# Patient Record
Sex: FEMALE | Race: WHITE
[De-identification: names, ages, dates, MRNs, and addresses within clinical notes are randomized per-mention and may not be internally consistent; named-entity substitution may affect disease eponyms.]

---

## 2020-01-06 ENCOUNTER — HOSPITAL ENCOUNTER (EMERGENCY)
Dept: HOSPITAL 46 - ED | Age: 75
Discharge: HOME | End: 2020-01-06
Payer: MEDICARE

## 2020-01-06 VITALS — HEIGHT: 66 IN | WEIGHT: 273.99 LBS | BODY MASS INDEX: 44.03 KG/M2

## 2020-01-06 DIAGNOSIS — E11.9: ICD-10-CM

## 2020-01-06 DIAGNOSIS — I10: ICD-10-CM

## 2020-01-06 DIAGNOSIS — Z79.4: ICD-10-CM

## 2020-01-06 DIAGNOSIS — Z79.899: ICD-10-CM

## 2020-01-06 DIAGNOSIS — M54.41: Primary | ICD-10-CM

## 2020-01-06 DIAGNOSIS — M62.838: ICD-10-CM

## 2020-01-08 ENCOUNTER — HOSPITAL ENCOUNTER (EMERGENCY)
Dept: HOSPITAL 46 - ED | Age: 75
Discharge: HOME | End: 2020-01-08
Payer: MEDICARE

## 2020-01-08 VITALS — BODY MASS INDEX: 40.34 KG/M2 | WEIGHT: 251 LBS | HEIGHT: 66 IN

## 2020-01-08 DIAGNOSIS — Z79.899: ICD-10-CM

## 2020-01-08 DIAGNOSIS — E11.9: ICD-10-CM

## 2020-01-08 DIAGNOSIS — Z79.52: ICD-10-CM

## 2020-01-08 DIAGNOSIS — I27.20: ICD-10-CM

## 2020-01-08 DIAGNOSIS — M54.31: Primary | ICD-10-CM

## 2020-01-08 DIAGNOSIS — Z79.4: ICD-10-CM

## 2020-01-08 NOTE — XMS
Encounter Summary
  Created on: 2020
 
 Viviana Ricci
 External Reference #: [id number]
 : 47
 Sex: Female
 
 Demographics
 
 
+-----------------------+----------------------+
| Address               | [address]      |
|                       | [place]  [address] |
+-----------------------+----------------------+
| Home Phone            | [phone]      |
+-----------------------+----------------------+
| Preferred Language    | Unknown              |
+-----------------------+----------------------+
| Marital Status        | Single               |
+-----------------------+----------------------+
| Latter day Affiliation | [id number]                 |
+-----------------------+----------------------+
| Race                  | Unknown              |
+-----------------------+----------------------+
| Ethnic Group          | Unknown              |
+-----------------------+----------------------+
 
 
 Author
 
 
+--------------+--------------------------------------------+
| Author       | [facility]  |
|              | and [facility]                                |
+--------------+--------------------------------------------+
| Organization | [facility]  |
|              | and [facility]                                |
+--------------+--------------------------------------------+
| Address      | Unknown                                    |
+--------------+--------------------------------------------+
| Phone        | Unavailable                                |
+--------------+--------------------------------------------+
 
 
 
 Support
 
 
+----------------+--------------+---------------------+-----------------+
| Name           | Relationship | Address             | Phone           |
+----------------+--------------+---------------------+-----------------+
| Ada/Ed Radhames | ECON         | [place]              | [phone] |
|                |              | [place]  |                 |
|                |              |  [address]              |                 |
+----------------+--------------+---------------------+-----------------+
 
 
 
 
 Care Team Providers
 
 
+-----------------------+------+-------------+
| Care Team Member Name | Role | Phone       |
+-----------------------+------+-------------+
 PCP  | Unavailable |
+-----------------------+------+-------------+
 
 
 
 Reason for Visit
 
 
+-------------------+----------+
| Reason            | Comments |
+-------------------+----------+
| Medication Refill |          |
+-------------------+----------+
 
 
 
 Encounter Details
 
 
+--------+--------+---------------------+----------------------+-------------------+
| Date   | Type   | Department          | Care Team            | Description       |
+--------+--------+---------------------+----------------------+-------------------+
| / | Refill |   [facility]         |   Marzena Moser  | Medication Refill |
|    |        | NEPHROLOGY  [address]   | M, DO  [address]      |                   |
|        |        | [address]   | [address]      |                   |
|        |        | [place], [place]     | [place], [place]      |                   |
|        |        | [address]          | [address]  [phone]  |                   |
|        |        | [phone]        |  [phone] (Fax)  |                   |
+--------+--------+---------------------+----------------------+-------------------+
 
 
 
 Social History
 
 
+--------------+-------+-----------+--------+------+
| Tobacco Use  | Types | Packs/Day | Years  | Date |
|              |       |           | Used   |      |
+--------------+-------+-----------+--------+------+
| Never Smoker |       |           |        |      |
+--------------+-------+-----------+--------+------+
 
 
 
+---------------------+---+---+---+
| Smokeless Tobacco:  |   |   |   |
| Never Used          |   |   |   |
+---------------------+---+---+---+
 
 
 
+---------------------------------------------------------------+
| Comments: some second hand smoke exposure, but fairly minimal |
 
+---------------------------------------------------------------+
 
 
 
+-------------+-------------+---------+----------+
| Alcohol Use | Drinks/Week | oz/Week | Comments |
+-------------+-------------+---------+----------+
| No          |             |         |          |
+-------------+-------------+---------+----------+
 
 
 
+------------------+---------------+
| Sex Assigned at  | Date Recorded |
| Birth            |               |
+------------------+---------------+
| Not on file      |               |
+------------------+---------------+
 
 
 
+----------------+-------------+-------------+
| Job Start Date | Occupation  | Industry    |
+----------------+-------------+-------------+
| Not on file    | Not on file | Not on file |
+----------------+-------------+-------------+
 
 
 
+----------------+--------------+------------+
| Travel History | Travel Start | Travel End |
+----------------+--------------+------------+
 
 
 
+-------------------------------------+
| No recent travel history available. |
+-------------------------------------+
 documented as of this encounter
 
 Plan of Treatment
 
 
+--------+-----------+------------+----------------------+-------------------+
| Date   | Type      | Specialty  | Care Team            | Description       |
+--------+-----------+------------+----------------------+-------------------+
| / | Office    | Cardiology |   HellalfredoTonia,    |                   |
|    | Visit     |            | ARNP  [address]   |                   |
|        |           |            | St  [place], [place]  |                   |
|        |           |            | [address]  [phone]  |                   |
|        |           |            |  [phone] (Fax)  |                   |
+--------+-----------+------------+----------------------+-------------------+
| / | Hospital  | Radiology  |   Popeye Townsend, |                   |
|    | Encounter |            |  MD  [address] |                   |
|        |           |            |  St.  [place],   |                   |
|        |           |            | [place] [address]             |                   |
|        |           |            | [phone]         |                   |
|        |           |            | [phone] (Fax)   |                   |
+--------+-----------+------------+----------------------+-------------------+
| / | Surgery   | Radiology  |   Popeye Townsend, | CV EP PPM SYSTEM  |
 
|    |           |            |  MD  [address] | IMPLANT           |
|        |           |            |  St.  [place],   |                   |
|        |           |            | [place] [address]             |                   |
|        |           |            | [phone]         |                   |
|        |           |            | [phone] (Fax)   |                   |
+--------+-----------+------------+----------------------+-------------------+
| 02/10/ | Clinical  | Cardiology |                      |                   |
|    | Support   |            |                      |                   |
+--------+-----------+------------+----------------------+-------------------+
| / | Office    | Cardiology |   Tonia Wilson,    |                   |
|    | Visit     |            | ARNP  [address]   |                   |
|        |           |            | [place]  |                   |
|        |           |            | [address]  [phone]  |                   |
|        |           |            |  [phone] (Fax)  |                   |
+--------+-----------+------------+----------------------+-------------------+
| / | Off-Site  | Nephrology |   Marzena Moser  |                   |
|    | Visit     |            | DO ETIENNE  [address] [place] [address]      |                   |
|        |           |            | [address]      |                   |
|        |           |            | [place]      |                   |
|        |           |            | [address]  [phone]  |                   |
|        |           |            |  [phone] (Fax)  |                   |
+--------+-----------+------------+----------------------+-------------------+
 documented as of this encounter
 
 Visit Diagnoses
 
 
+------------------------------------------------------------------------------------------+
| Diagnosis                                                                                |
+------------------------------------------------------------------------------------------+
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
| stage IV, or unspecified(403.90) - Primary  Unspecified hypertensive kidney disease with |
|  chronic kidney disease stage I through stage IV, or unspecified                         |
+------------------------------------------------------------------------------------------+
|   Complications of transplanted kidney                                                   |
+------------------------------------------------------------------------------------------+
|   DIABETES MELLITUS, TYPE II, UNCONTROLLED  Type II or unspecified type diabetes         |
| mellitus without mention of complication, uncontrolled                                   |
+------------------------------------------------------------------------------------------+
|   Hyperlipidemia  Other and unspecified hyperlipidemia                                   |
+------------------------------------------------------------------------------------------+
 documented in this encounter

## 2020-01-08 NOTE — XMS
Encounter Summary
  Created on: 2020
 
 Viviana Ricci
 External Reference #: [id number]
 : 47
 Sex: Female
 
 Demographics
 
 
+-----------------------+----------------------+
| Address               | [address]      |
|                       | [place]  [address] |
+-----------------------+----------------------+
| Home Phone            | [phone]      |
+-----------------------+----------------------+
| Preferred Language    | Unknown              |
+-----------------------+----------------------+
| Marital Status        | Single               |
+-----------------------+----------------------+
| Muslim Affiliation | 1009                 |
+-----------------------+----------------------+
| Race                  | Unknown              |
+-----------------------+----------------------+
| Ethnic Group          | Unknown              |
+-----------------------+----------------------+
 
 
 Author
 
 
+--------------+--------------------------------------------+
| Author       | [facility]  |
|              | and [facility]                                |
+--------------+--------------------------------------------+
| Organization | [facility]  |
|              | and [facility]                                |
+--------------+--------------------------------------------+
| Address      | Unknown                                    |
+--------------+--------------------------------------------+
| Phone        | Unavailable                                |
+--------------+--------------------------------------------+
 
 
 
 Support
 
 
+----------------+--------------+---------------------+-----------------+
| Name           | Relationship | Address             | Phone           |
+----------------+--------------+---------------------+-----------------+
| Ada/Ed Radhames | ECON         | [place]              | [phone] |
|                |              | [place]  |                 |
|                |              |  [address]              |                 |
+----------------+--------------+---------------------+-----------------+
 
 
 
 
 Care Team Providers
 
 
+-----------------------+------+-------------+
| Care Team Member Name | Role | Phone       |
+-----------------------+------+-------------+
 PCP  | Unavailable |
+-----------------------+------+-------------+
 
 
 
 Reason for Visit
 
 
+-------------------+----------+
| Reason            | Comments |
+-------------------+----------+
| Medication Refill |          |
+-------------------+----------+
 
 
 
 Encounter Details
 
 
+--------+--------+---------------------+----------------------+-------------------+
| Date   | Type   | Department          | Care Team            | Description       |
+--------+--------+---------------------+----------------------+-------------------+
| / | Refill |   [facility]         |   Marzena Moser  | Medication Refill |
|    |        | NEPHROLOGY  [address]   | M, DO  [address]      |                   |
|        |        | [address]   | [address]      |                   |
|        |        | [place], [place]     | [place], [place]      |                   |
|        |        | [address]          | [address]  [phone]  |                   |
|        |        | [phone]        |  [phone] (Fax)  |                   |
+--------+--------+---------------------+----------------------+-------------------+
 
 
 
 Social History
 
 
+--------------+-------+-----------+--------+------+
| Tobacco Use  | Types | Packs/Day | Years  | Date |
|              |       |           | Used   |      |
+--------------+-------+-----------+--------+------+
| Never Smoker |       |           |        |      |
+--------------+-------+-----------+--------+------+
 
 
 
+---------------------+---+---+---+
| Smokeless Tobacco:  |   |   |   |
| Never Used          |   |   |   |
+---------------------+---+---+---+
 
 
 
+---------------------------------------------------------------+
| Comments: some second hand smoke exposure, but fairly minimal |
 
+---------------------------------------------------------------+
 
 
 
+-------------+-------------+---------+----------+
| Alcohol Use | Drinks/Week | oz/Week | Comments |
+-------------+-------------+---------+----------+
| No          |             |         |          |
+-------------+-------------+---------+----------+
 
 
 
+------------------+---------------+
| Sex Assigned at  | Date Recorded |
| Birth            |               |
+------------------+---------------+
| Not on file      |               |
+------------------+---------------+
 
 
 
+----------------+-------------+-------------+
| Job Start Date | Occupation  | Industry    |
+----------------+-------------+-------------+
| Not on file    | Not on file | Not on file |
+----------------+-------------+-------------+
 
 
 
+----------------+--------------+------------+
| Travel History | Travel Start | Travel End |
+----------------+--------------+------------+
 
 
 
+-------------------------------------+
| No recent travel history available. |
+-------------------------------------+
 documented as of this encounter
 
 Plan of Treatment
 
 
+--------+-----------+------------+----------------------+-------------------+
| Date   | Type      | Specialty  | Care Team            | Description       |
+--------+-----------+------------+----------------------+-------------------+
| / | Office    | Cardiology |   HellalfredoTonia,    |                   |
|    | Visit     |            | ARNP  [address]   |                   |
|        |           |            | St  [place], [place]  |                   |
|        |           |            | [address]  [phone]  |                   |
|        |           |            |  [phone] (Fax)  |                   |
+--------+-----------+------------+----------------------+-------------------+
| / | Hospital  | Radiology  |   Popeye Townsend, |                   |
|    | Encounter |            |  MD  [address] |                   |
|        |           |            |  St.  [place],   |                   |
|        |           |            | [place] [address]             |                   |
|        |           |            | [phone]         |                   |
|        |           |            | [phone] (Fax)   |                   |
+--------+-----------+------------+----------------------+-------------------+
| / | Surgery   | Radiology  |   Popeye Townsend, | CV EP PPM SYSTEM  |
 
|    |           |            |  MD  [address] | IMPLANT           |
|        |           |            |  St.  [place],   |                   |
|        |           |            | [place] [address]             |                   |
|        |           |            | [phone]         |                   |
|        |           |            | [phone] (Fax)   |                   |
+--------+-----------+------------+----------------------+-------------------+
| 02/10/ | Clinical  | Cardiology |                      |                   |
|    | Support   |            |                      |                   |
+--------+-----------+------------+----------------------+-------------------+
| / | Office    | Cardiology |   Tonia Wilson,    |                   |
|    | Visit     |            | ARNP  [address]   |                   |
|        |           |            | [place]  |                   |
|        |           |            | [address]  [phone]  |                   |
|        |           |            |  [phone] (Fax)  |                   |
+--------+-----------+------------+----------------------+-------------------+
| / | Off-Site  | Nephrology |   Marzena Moser  |                   |
|    | Visit     |            | DO ETIENNE  [address] [place] [address]      |                   |
|        |           |            | [address]      |                   |
|        |           |            | [place]      |                   |
|        |           |            | [address]  [phone]  |                   |
|        |           |            |  [phone] (Fax)  |                   |
+--------+-----------+------------+----------------------+-------------------+
 documented as of this encounter
 
 Visit Diagnoses
 
 
+------------------------------------------------------------------------------------------+
| Diagnosis                                                                                |
+------------------------------------------------------------------------------------------+
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
| stage IV, or unspecified(403.90) - Primary  Unspecified hypertensive kidney disease with |
|  chronic kidney disease stage I through stage IV, or unspecified                         |
+------------------------------------------------------------------------------------------+
|   Complications of transplanted kidney                                                   |
+------------------------------------------------------------------------------------------+
|   DIABETES MELLITUS, TYPE II, UNCONTROLLED  Type II or unspecified type diabetes         |
| mellitus without mention of complication, uncontrolled                                   |
+------------------------------------------------------------------------------------------+
|   Hyperlipidemia  Other and unspecified hyperlipidemia                                   |
+------------------------------------------------------------------------------------------+
 documented in this encounter

## 2020-01-08 NOTE — XMS
Encounter Summary
  Created on: 2020
 
 Viviana Ricci
 External Reference #: [id number]
 : 47
 Sex: Female
 
 Demographics
 
 
+-----------------------+----------------------+
| Address               | [address]      |
|                       | [place]  [address] |
+-----------------------+----------------------+
| Home Phone            | [phone]      |
+-----------------------+----------------------+
| Preferred Language    | Unknown              |
+-----------------------+----------------------+
| Marital Status        | Single               |
+-----------------------+----------------------+
| [employer] Affiliation | [id number]                 |
+-----------------------+----------------------+
| Race                  | Unknown              |
+-----------------------+----------------------+
| Ethnic Group          | Unknown              |
+-----------------------+----------------------+
 
 
 Author
 
 
+--------------+--------------------------------------------+
| Author       | [facility]  |
|              | and [facility]                                |
+--------------+--------------------------------------------+
| Organization | [facility]  |
|              | and [facility]                                |
+--------------+--------------------------------------------+
| Address      | Unknown                                    |
+--------------+--------------------------------------------+
| Phone        | Unavailable                                |
+--------------+--------------------------------------------+
 
 
 
 Support
 
 
+----------------+--------------+---------------------+-----------------+
| Name           | Relationship | Address             | Phone           |
+----------------+--------------+---------------------+-----------------+
| Ada/Ed Radhames | ECON         | [place]              | [phone] |
|                |              | [place]  |                 |
|                |              |  [id number]              |                 |
+----------------+--------------+---------------------+-----------------+
 
 
 
 
 Care Team Providers
 
 
+-----------------------+------+-------------+
| Care Team Member Name | Role | Phone       |
+-----------------------+------+-------------+
 PCP  | Unavailable |
+-----------------------+------+-------------+
 
 
 
 Reason for Visit
 Evaluate & Treat (Routine)
 
+--------+--------+------------+--------------+--------------+---------------+
| Status | Reason | Specialty  | Diagnoses /  | Referred By  | Referred To   |
|        |        |            | Procedures   | Contact      | Contact       |
+--------+--------+------------+--------------+--------------+---------------+
| Closed |        | Nephrology |   Diagnoses  |   Referral,  |   Stroemel,   |
|        |        |            |              | Self         | Marzena CLIFTON DO |
|        |        |            | Complication |              |   [address]    |
|        |        |            | s of         |              | [address]   |
|        |        |            | transplanted |              | [address]  [place]    |
|        |        |            |  kidney      |              | [place]     |
|        |        |            | Chronic      |              | [address]  Phone: |
|        |        |            | kidney       |              |  [phone] |
|        |        |            | disease,     |              |   Fax:        |
|        |        |            | stage I      |              | [phone]  |
|        |        |            | Unspecified  |              |               |
|        |        |            | essential    |              |               |
|        |        |            | hypertension |              |               |
|        |        |            |              |              |               |
|        |        |            | Unspecified  |              |               |
|        |        |            | hypertensive |              |               |
|        |        |            |  kidney      |              |               |
|        |        |            | disease with |              |               |
|        |        |            |  chronic     |              |               |
|        |        |            | kidney       |              |               |
|        |        |            | disease      |              |               |
|        |        |            | stage I      |              |               |
|        |        |            | through      |              |               |
|        |        |            | stage IV, or |              |               |
|        |        |            |              |              |               |
|        |        |            | unspecified( |              |               |
|        |        |            | 403.90)      |              |               |
|        |        |            | Procedures   |              |               |
|        |        |            | PA OFFICE    |              |               |
|        |        |            | OUTPATIENT   |              |               |
|        |        |            | VISIT 25     |              |               |
|        |        |            | MINUTES      |              |               |
+--------+--------+------------+--------------+--------------+---------------+
 
 
 
 
 Encounter Details
 
 
+--------+-----------+---------------------+----------------------+----------------------+
 
| Date   | Type      | Department          | Care Team            | Description          |
+--------+-----------+---------------------+----------------------+----------------------+
| / | Off-Site  |   [facility]         |   Marzena Moser  | Unspecified          |
|    | Visit     | NEPHROLOGY  [address]   | M, DO  [address]      | hypertensive kidney  |
|        |           | [address]   | [address]      | disease with chronic |
|        |           | [place], [place]     | [place]      |  kidney disease      |
|        |           | [address]          | [address]  [phone]  | stage I through      |
|        |           | [phone]        |  [phone] (Fax)  | stage IV, or         |
|        |           |                     |                      | unspecified (Primary |
|        |           |                     |                      |  Dx); FSGS (focal    |
|        |           |                     |                      | segmental            |
|        |           |                     |                      | glomerulosclerosis); |
|        |           |                     |                      |  Hypothyroidism;     |
|        |           |                     |                      | Hyperlipidemia       |
+--------+-----------+---------------------+----------------------+----------------------+
 
 
 
 Social History
 
 
+--------------+-------+-----------+--------+------+
| Tobacco Use  | Types | Packs/Day | Years  | Date |
|              |       |           | Used   |      |
+--------------+-------+-----------+--------+------+
| Never Smoker |       |           |        |      |
+--------------+-------+-----------+--------+------+
 
 
 
+---------------------+---+---+---+
| Smokeless Tobacco:  |   |   |   |
| Never Used          |   |   |   |
+---------------------+---+---+---+
 
 
 
+---------------------------------------------------------------+
| Comments: some second hand smoke exposure, but fairly minimal |
+---------------------------------------------------------------+
 
 
 
+-------------+-------------+---------+----------+
| Alcohol Use | Drinks/Week | oz/Week | Comments |
+-------------+-------------+---------+----------+
| No          |             |         |          |
+-------------+-------------+---------+----------+
 
 
 
+------------------+---------------+
| Sex Assigned at  | Date Recorded |
| Birth            |               |
+------------------+---------------+
| Not on file      |               |
+------------------+---------------+
 
 
 
 
+----------------+-------------+-------------+
| Job Start Date | Occupation  | Industry    |
+----------------+-------------+-------------+
| Not on file    | Not on file | Not on file |
+----------------+-------------+-------------+
 
 
 
+----------------+--------------+------------+
| Travel History | Travel Start | Travel End |
+----------------+--------------+------------+
 
 
 
+-------------------------------------+
| No recent travel history available. |
+-------------------------------------+
 documented as of this encounter
 
 Last Filed Vital Signs
 
 
+-------------------+---------------------+----------------------+----------+
| Vital Sign        | Reading             | Time Taken           | Comments |
+-------------------+---------------------+----------------------+----------+
| Blood Pressure    | 130/86              | 2014  1:14 PM  |          |
|                   |                     | PDT                  |          |
+-------------------+---------------------+----------------------+----------+
| Pulse             | -                   | -                    |          |
+-------------------+---------------------+----------------------+----------+
| Temperature       | 36.4   C (97.5   F) | 2014  1:14 PM  |          |
|                   |                     | PDT                  |          |
+-------------------+---------------------+----------------------+----------+
| Respiratory Rate  | -                   | -                    |          |
+-------------------+---------------------+----------------------+----------+
| Oxygen Saturation | -                   | -                    |          |
+-------------------+---------------------+----------------------+----------+
| Inhaled Oxygen    | -                   | -                    |          |
| Concentration     |                     |                      |          |
+-------------------+---------------------+----------------------+----------+
| Weight            | -                   | -                    |          |
+-------------------+---------------------+----------------------+----------+
| Height            | -                   | -                    |          |
+-------------------+---------------------+----------------------+----------+
| Body Mass Index   | -                   | -                    |          |
+-------------------+---------------------+----------------------+----------+
 documented in this encounter
 
 Progress Notes
 Marzena Moser DO - 2014  4:22 PM PDTFormatting of this note might be different
 from the original.
 Subjective:  NEPHROLOGY 
  
 Patient ID: Viviana Ricci is a 68 y.o. female.
 
 HPI Comments: 
 Followup for this 68 year old white female s/p renal allograft, 05, with previous allo
graft dysfunction secondary to FSGS, also with Type II DM, hypertension, hypothyroidism, hyp
erlipidemia, SHPTH,  previous low back pain, obesity/JACK, chronic pulmonary  hypertension, h
istorically related to centripetal obesity, and  CAD, s/p CABG x3 vessels,.  She refuses
 
 to weight today, but is otherwise , very pleasant and an accurate historian.  She denies co
ugh,  fever, chills, or dyspnea.
 
 MEDS:
 
 Prograf 1.5 mg, BID
 Mycophenolate 250 mg, TID.
 Prednisone 5 mg, daily.
 Outpatient Prescriptions Marked as Taking for the 14 encounter (Off-Site Visit) with ETIENNE Moser, DO 
 Medication Sig Dispense Refill 
   allopurinol (ZYLOPRIM) 100 mg tablet Take 1 tablet by mouth Daily.  30 tablet  11 
   aspirin 81 MG EC tablet Take 81 mg by mouth Daily.     
   cholecalciferol (VITAMIN D-3) 2000 UNITS TABS Take 1,000 Units by mouth Three times a w
[place].     
   cinacalcet (SENSIPAR) 30 mg tablet Take 1 tablet by mouth Daily.  30 tablet  12 
   fludrocortisone (FLORINEF) 0.1 mg tablet Take 1 tablet by mouth Every other day.  45 ta
blet  2 
   fluticasone (FLOVENT HFA) 220 mcg/puff inhaler Inhale 1 puff into the lungs 2 times shante
ly. Rinse mouth after use.  1 Inhaler  11 
   furosemide (LASIX) 40 mg tablet Take 40 mg by mouth Daily as needed.     
   [DISCONTINUED] furosemide (LASIX) 40 mg tablet Take 1 tablet by mouth Daily.  30 tablet
  5 
   glucose blood VI test strips (ONE TOUCH ULTRA TEST) strip Check blood sugar before each
 meal and as directed  100 each  12 
   insulin glargine (LANTUS) 100 units/mL injection Inject 20 Units under the skin every m
orning.  10 mL  11 
   insulin lispro (HUMALOG) 100 units/mL injection Inject subcutaneously before meals acco
rding to sliding scale  10 vial  11 
   Insulin Syringe-Needle U-100 (BD INSULIN SYRINGE ULTRAFINE) 31G X 5/16" 0.5 ML MISC Use
 before meals and as directed.  100 each  11 
   levothyroxine (LEVOTHROID) 50 mcg tablet Take 1 tablet by mouth Daily.  30 tablet  11 
   lisinopril (PRINIVIL,ZESTRIL) 30 MG tablet Take 1 tablet by mouth Daily.  90 tablet  3 
   loperamide (ANTI-DIARRHEAL) 2 mg capsule Take 1 capsule by mouth 4 times daily as neede
d.  60 capsule  5 
   [DISCONTINUED] loperamide (ANTI-DIARRHEAL) 2 mg capsule Take 2 mg by mouth Daily as nee
ded.     
   magnesium oxide (MAG-OX) 400 mg tablet Take 1 tablet by mouth 2 times daily.  62 tablet
  12 
   metoprolol tartrate (LOPRESSOR) 25 mg tablet Take 1 tablet by mouth 2 times daily.  60 
tablet  11 
   omeprazole (PRILOSEC) 20 mg capsule Take one capsule by mouth once daily on an empty st
omach  90 capsule  3 
   Prenatal Multivit-Min-Fe-FA (PRENATAL VITAMINS) 0.8 MG TABS Take 0.8 mg by mouth Daily.
  30 each  11 
   [DISCONTINUED] Prenatal Vit-Fe Fumarate-FA (PNV PRENATAL PLUS MULTIVITAMIN) 27-1 MG TAB
S      
   Respiratory Therapy Supplies MISC Decrease CPAP to 12-18 cmH2O Diagnosis Code(s)327.23.
 Please send order to [facility].  1 each  0 
   rosuvastatin (CRESTOR) 20 mg tablet Take 1 tablet by mouth nightly.  30 tablet  11 
   valsartan (DIOVAN) 160 mg tablet Take 1 tablet by mouth Daily.  30 tablet  11 
 
 No Known Allergies
 
 Objective:   Blood pressure 130/86, temperature 36.4 C (97.5 F).
 weight = refused.
  
 Physical Exam
 
 HEENT: No thrush.
 
 Heart:  Regular rate and rhythm with grade  1/6 MAYA at LSB, no S3, or rub.
 Lungs:   CTA bilaterally, no rales or wheezes.
 Abdomen:  Soft, obese, the renal allograft in RLQ is nontender, normoactive bowel sounds.
 Extremities: No clubbing, cyanosis,  edema, or foot ulcers.
 
 LAB:   
 
 Lab Results 
 Component Value Date 
   2014 
  K 5.4 2014 
   2014 
  CO2 22 2014 
  BUN 40 2014 
  CREEX 1.44* 2014 
  EGFREX 36* 2014 
   2014 
  CALCIUM 10.4 2014 
  PHOS 3.0 2014 
  .9 2014 
  BVV8FDH 7.3* 2014 
 
 Lab Results 
 Component Value Date 
  CHOLEX 166 2014 
  HDLEX 52.6 2014 
  LDLEX 76 2014 
  TRIGEX 186* 2014 
 
 Lab Results 
 Component Value Date 
  TACROLIMUS 5.2 2014 
 .
 
 Lab Results 
 Component Value Date 
  WBCEX 6.3 2014 
  HGBEX 13.5 2014 
  HCTEX 40.8 2014 
  PLTEX 180 2014 
 
  
 
 Assessment: 
 
 1.  Renal Allograft--Scr is close to baseline.  
 2.  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
 3.  Type 2 DM--good control  by the most recent Hbaic.  
 4.  Hyperlipidemia--stable on crestor.
 5.  Hypertension--good control.
 6.  SHPTH--stable for the circumstances.
 7.  Obesity/JACK/Pulmonary hypertension-- stable. 
 8.  CAD, s/p CABG, --stable on ASA, metoprolol, atorvastatin.
 9.  Type IV RTA--stable.
 10. Chronic Low Back pain--in remission.
 11.  worsening DJD, left knee--about same.
 
 Plan: 
 
  
 
 1.  I discussed with her that her Scr, and tacrolimus levels appear satisfactory.  She appe
ars very consistent with her medical regimen.
 2.  She is very sensitive about her weight, and feels somewhat fatalistic about ever contro
lling her obesity, or modifying her lifestyle.
 3.  No change in meds.
 4.  Will plan to see her back in 6 mo. at the [facility] , [place], [place]. She will alyx
nue to have her standing order done Q 2 months.
 
 CC:    Jose David Dalal M.D., [facility], [facility]
            Edgar Sanders MD, [facility], Orthopedics
 
 Electronically signed by Marzena Moser DO at 2014  4:34 PM PDTdocumented in thi
s encounter
 
 Plan of Treatment
 
 
+--------+-----------+------------+----------------------+-------------------+
| Date   | Type      | Specialty  | Care Team            | Description       |
+--------+-----------+------------+----------------------+-------------------+
| / | Office    | Cardiology |   Tonia Wilson,    |                   |
|    | Visit     |            | ARNP  [address]   |                   |
|        |           |            | [place], [place]  |                   |
|        |           |            | [address]  [phone]  |                   |
|        |           |            |  [phone] (Fax)  |                   |
+--------+-----------+------------+----------------------+-------------------+
| / | Hospital  | Radiology  |   Popeye Townsend, |                   |
|    | Encounter |            |  MD  [address] |                   |
|        |           |            |  [place],   |                   |
|        |           |            | [place] [address]             |                   |
|        |           |            | [phone]         |                   |
|        |           |            | [phone] (Fax)   |                   |
+--------+-----------+------------+----------------------+-------------------+
| / | Surgery   | Radiology  |   Popeye Townsend, | CV EP PPM SYSTEM  |
|    |           |            |  MD  [address] | IMPLANT           |
|        |           |            |  [place],   |                   |
|        |           |            | [place] [address]             |                   |
|        |           |            | [phone]         |                   |
|        |           |            | [phone] (Fax)   |                   |
+--------+-----------+------------+----------------------+-------------------+
| 02/10/ | Clinical  | Cardiology |                      |                   |
|    | Support   |            |                      |                   |
+--------+-----------+------------+----------------------+-------------------+
| / | Office    | Cardiology |   RakeshmaxxalfredoArlena,    |                   |
|    | Visit     |            | ARNP  [address]   |                   |
|        |           |            | [place]  |                   |
|        |           |            | [address]  [phone]  |                   |
|        |           |            |  [phone] (Fax)  |                   |
+--------+-----------+------------+----------------------+-------------------+
| / | Off-Site  | Nephrology |   Marzena Moser  |                   |
|    | Visit     |            | DO ETIENNE  [address] [place] [address]      |                   |
|        |           |            | [address]      |                   |
|        |           |            | [place]      |                   |
|        |           |            | [address]  [phone]  |                   |
|        |           |            |  [phone] (Fax)  |                   |
+--------+-----------+------------+----------------------+-------------------+
 documented as of this encounter
 
 Visit Diagnoses
 
 
 
+-----------------------------------------------------------------------------------------+
| Diagnosis                                                                               |
+-----------------------------------------------------------------------------------------+
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
| stage IV, or unspecified - Primary                                                      |
+-----------------------------------------------------------------------------------------+
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
| membranous glomerulonephritis                                                           |
+-----------------------------------------------------------------------------------------+
|   Hypothyroidism  Unspecified hypothyroidism                                            |
+-----------------------------------------------------------------------------------------+
|   Hyperlipidemia  Other and unspecified hyperlipidemia                                  |
+-----------------------------------------------------------------------------------------+
 documented in this encounter

## 2020-01-08 NOTE — XMS
Encounter Summary
  Created on: 2020
 
 Viviana Ricci
 External Reference #: [id number]
 : 47
 Sex: Female
 
 Demographics
 
 
+-----------------------+----------------------+
| Address               | [address]      |
|                       | [place]  [address] |
+-----------------------+----------------------+
| Home Phone            | [phone]      |
+-----------------------+----------------------+
| Preferred Language    | Unknown              |
+-----------------------+----------------------+
| Marital Status        | Single               |
+-----------------------+----------------------+
| Spiritism Affiliation | [id number]                 |
+-----------------------+----------------------+
| Race                  | Unknown              |
+-----------------------+----------------------+
| Ethnic Group          | Unknown              |
+-----------------------+----------------------+
 
 
 Author
 
 
+--------------+--------------------------------------------+
| Author       | [facility]  |
|              | and [place]                                |
+--------------+--------------------------------------------+
| Organization | [facility]  |
|              | and [place]                                |
+--------------+--------------------------------------------+
| Address      | Unknown                                    |
+--------------+--------------------------------------------+
| Phone        | Unavailable                                |
+--------------+--------------------------------------------+
 
 
 
 Support
 
 
+----------------+--------------+---------------------+-----------------+
| Name           | Relationship | Address             | Phone           |
+----------------+--------------+---------------------+-----------------+
| Ada/Ed Radhames | ECON         | [place]              | [phone] |
|                |              | [place]  |                 |
|                |              |  [address]              |                 |
+----------------+--------------+---------------------+-----------------+
 
 
 
 
 Care Team Providers
 
 
+-----------------------+------+-------------+
| Care Team Member Name | Role | Phone       |
+-----------------------+------+-------------+
 PCP  | Unavailable |
+-----------------------+------+-------------+
 
 
 
 Reason for Visit
 
 
+-------------------+----------+
| Reason            | Comments |
+-------------------+----------+
| Medication Refill |          |
+-------------------+----------+
 
 
 
 Encounter Details
 
 
+--------+--------+---------------------+----------------------+-------------------+
| Date   | Type   | Department          | Care Team            | Description       |
+--------+--------+---------------------+----------------------+-------------------+
| / | Refill |   [place]         |   Marzena Moser  | Medication Refill |
| 2016   |        | NEPHROLOGY  [address]   | M, DO  [address]      |                   |
|        |        | [address]   | [address]      |                   |
|        |        | [place], [place]     | [place], [place]      |                   |
|        |        | [address]          | [address]  [phone]  |                   |
|        |        | [phone]        |  [phone] (Fax)  |                   |
+--------+--------+---------------------+----------------------+-------------------+
 
 
 
 Social History
 
 
+--------------+-------+-----------+--------+------+
| Tobacco Use  | Types | Packs/Day | Years  | Date |
|              |       |           | Used   |      |
+--------------+-------+-----------+--------+------+
| Never Smoker |       |           |        |      |
+--------------+-------+-----------+--------+------+
 
 
 
+---------------------+---+---+---+
| Smokeless Tobacco:  |   |   |   |
| Never Used          |   |   |   |
+---------------------+---+---+---+
 
 
 
+---------------------------------------------------------------+
| Comments: some second hand smoke exposure, but fairly minimal |
 
+---------------------------------------------------------------+
 
 
 
+-------------+-------------+---------+----------+
| Alcohol Use | Drinks/Week | oz/Week | Comments |
+-------------+-------------+---------+----------+
| No          |             |         |          |
+-------------+-------------+---------+----------+
 
 
 
+------------------+---------------+
| Sex Assigned at  | Date Recorded |
| Birth            |               |
+------------------+---------------+
| Not on file      |               |
+------------------+---------------+
 
 
 
+----------------+-------------+-------------+
| Job Start Date | Occupation  | Industry    |
+----------------+-------------+-------------+
| Not on file    | Not on file | Not on file |
+----------------+-------------+-------------+
 
 
 
+----------------+--------------+------------+
| Travel History | Travel Start | Travel End |
+----------------+--------------+------------+
 
 
 
+-------------------------------------+
| No recent travel history available. |
+-------------------------------------+
 documented as of this encounter
 
 Plan of Treatment
 
 
+--------+-----------+------------+----------------------+-------------------+
| Date   | Type      | Specialty  | Care Team            | Description       |
+--------+-----------+------------+----------------------+-------------------+
| / | Office    | Cardiology |   HellalfredoTonia,    |                   |
|    | Visit     |            | ARNP  [address]   |                   |
|        |           |            | St  [place], [place]  |                   |
|        |           |            | [address]  [phone]  |                   |
|        |           |            |  [phone] (Fax)  |                   |
+--------+-----------+------------+----------------------+-------------------+
| / | Hospital  | Radiology  |   Popeye Townsend, |                   |
|    | Encounter |            |  MD  [address] |                   |
|        |           |            |  St.  [place],   |                   |
|        |           |            | [place] [address]             |                   |
|        |           |            | [phone]         |                   |
|        |           |            | [phone] (Fax)   |                   |
+--------+-----------+------------+----------------------+-------------------+
| / | Surgery   | Radiology  |   Popeye Townsend, | CV EP PPM SYSTEM  |
 
|    |           |            |  MD  [address] | IMPLANT           |
|        |           |            |  St.  [place],   |                   |
|        |           |            | [place] [address]             |                   |
|        |           |            | [phone]         |                   |
|        |           |            | [phone] (Fax)   |                   |
+--------+-----------+------------+----------------------+-------------------+
| 02/10/ | Clinical  | Cardiology |                      |                   |
|    | Support   |            |                      |                   |
+--------+-----------+------------+----------------------+-------------------+
| / | Office    | Cardiology |   Tonia Wilson,    |                   |
|    | Visit     |            | ARNP  [address]   |                   |
|        |           |            | [place]  |                   |
|        |           |            | [address]  [phone]  |                   |
|        |           |            |  [phone] (Fax)  |                   |
+--------+-----------+------------+----------------------+-------------------+
| / | Off-Site  | Nephrology |   Marzena Moser  |                   |
|    | Visit     |            | DO ETIENNE  [address] [place] [address]      |                   |
|        |           |            | [address]      |                   |
|        |           |            | [place]      |                   |
|        |           |            | [address]  [phone]  |                   |
|        |           |            |  [phone] (Fax)  |                   |
+--------+-----------+------------+----------------------+-------------------+
 documented as of this encounter
 
 Visit Diagnoses
 Not on filedocumented in this encounter

## 2020-01-08 NOTE — XMS
Encounter Summary
  Created on: 2020
 
 Viviana Ricci
 External Reference #: [id number]
 : 47
 Sex: Female
 
 Demographics
 
 
+-----------------------+----------------------+
| Address               | [address]      |
|                       | [place]  [address] |
+-----------------------+----------------------+
| Home Phone            | [phone]      |
+-----------------------+----------------------+
| Preferred Language    | Unknown              |
+-----------------------+----------------------+
| Marital Status        | Single               |
+-----------------------+----------------------+
| Adventism Affiliation | [id number]                 |
+-----------------------+----------------------+
| Race                  | Unknown              |
+-----------------------+----------------------+
| Ethnic Group          | Unknown              |
+-----------------------+----------------------+
 
 
 Author
 
 
+--------------+--------------------------------------------+
| Author       | [facility]  |
|              | and [place]                                |
+--------------+--------------------------------------------+
| Organization | [facility]  |
|              | and [place]                                |
+--------------+--------------------------------------------+
| Address      | Unknown                                    |
+--------------+--------------------------------------------+
| Phone        | Unavailable                                |
+--------------+--------------------------------------------+
 
 
 
 Support
 
 
+----------------+--------------+---------------------+-----------------+
| Name           | Relationship | Address             | Phone           |
+----------------+--------------+---------------------+-----------------+
| Ada/Ed Radhames | ECON         | [place]              | [phone] |
|                |              | [place]  |                 |
|                |              |  [address]              |                 |
+----------------+--------------+---------------------+-----------------+
 
 
 
 
 Care Team Providers
 
 
+-----------------------+------+-------------+
| Care Team Member Name | Role | Phone       |
+-----------------------+------+-------------+
 PCP  | Unavailable |
+-----------------------+------+-------------+
 
 
 
 Encounter Details
 
 
+--------+----------+---------------------+----------------------+-------------+
| Date   | Type     | Department          | Care Team            | Description |
+--------+----------+---------------------+----------------------+-------------+
| / | Abstract |   [facility] [place]         |   Marzena Moser  |             |
| 2017   |          | NEPHROLOGY  [address]   | M, DO  [address]      |             |
|        |          | [address]   | [address]      |             |
|        |          | [place], [place]     | [place]      |             |
|        |          | [address]          | [address]  [phone]  |             |
|        |          | [phone]        |  [phone] (Fax)  |             |
+--------+----------+---------------------+----------------------+-------------+
 
 
 
 Social History
 
 
+--------------+-------+-----------+--------+------+
| Tobacco Use  | Types | Packs/Day | Years  | Date |
|              |       |           | Used   |      |
+--------------+-------+-----------+--------+------+
| Never Smoker |       |           |        |      |
+--------------+-------+-----------+--------+------+
 
 
 
+---------------------+---+---+---+
| Smokeless Tobacco:  |   |   |   |
| Never Used          |   |   |   |
+---------------------+---+---+---+
 
 
 
+---------------------------------------------------------------+
| Comments: some second hand smoke exposure, but fairly minimal |
+---------------------------------------------------------------+
 
 
 
+-------------+-------------+---------+----------+
| Alcohol Use | Drinks/Week | oz/Week | Comments |
+-------------+-------------+---------+----------+
| No          |             |         |          |
+-------------+-------------+---------+----------+
 
 
 
 
+------------------+---------------+
| Sex Assigned at  | Date Recorded |
| Birth            |               |
+------------------+---------------+
| Not on file      |               |
+------------------+---------------+
 
 
 
+----------------+-------------+-------------+
| Job Start Date | Occupation  | Industry    |
+----------------+-------------+-------------+
| Not on file    | Not on file | Not on file |
+----------------+-------------+-------------+
 
 
 
+----------------+--------------+------------+
| Travel History | Travel Start | Travel End |
+----------------+--------------+------------+
 
 
 
+-------------------------------------+
| No recent travel history available. |
+-------------------------------------+
 documented as of this encounter
 
 Progress Maite Perez - 2017  8:50 AM PSTOutside record:  Refill authorization request for
[facility]is [facility], dos:  17.  Sent to scan.Electronically signed by Maite Raygoza at 2017  8:50 AM PSTdocumented in this encounter
 
 Plan of Treatment
 
 
+--------+-----------+------------+----------------------+-------------------+
| Date   | Type      | Specialty  | Care Team            | Description       |
+--------+-----------+------------+----------------------+-------------------+
| / | Office    | Cardiology |   Tonia Wilson,    |                   |
| 2020   | Visit     |            | ARNJESSICA  [address]   |                   |
|        |           |            | St  [facility] [place]  |                   |
|        |           |            | [address]  [phone]  |                   |
|        |           |            |  [phone] (Fax)  |                   |
+--------+-----------+------------+----------------------+-------------------+
| / | Hospital  | Radiology  |   Popeye Townsend, |                   |
|    | Encounter |            |  MD  [address] |                   |
|        |           |            |  St.  [place],   |                   |
|        |           |            | [place] [address]             |                   |
|        |           |            | [phone]         |                   |
|        |           |            | [phone] (Fax)   |                   |
+--------+-----------+------------+----------------------+-------------------+
| / | Surgery   | Radiology  |   Popeye Townsend, | CV EP PPM SYSTEM  |
|    |           |            |  MD  [address] | IMPLANT           |
|        |           |            |  St.  [place],   |                   |
|        |           |            | [place] [address]             |                   |
|        |           |            | [phone]         |                   |
|        |           |            | [phone] (Fax)   |                   |
+--------+-----------+------------+----------------------+-------------------+
 
| 02/10/ | Clinical  | Cardiology |                      |                   |
2020   | Support   |            |                      |                   |
+--------+-----------+------------+----------------------+-------------------+
| / | Office    | Cardiology |   Tonia Wilson,    |                   |
|    | Visit     |            | BELKIS  [address]   |                   |
|        |           |            | [place]  |                   |
|        |           |            | [address]  [phone]  |                   |
|        |           |            |  [phone] (Fax)  |                   |
+--------+-----------+------------+----------------------+-------------------+
| / | Off-Site  | Nephrology |   Marzena Moser  |                   |
|    | Visit     |            | DO LUIS M  [address] [place] [address]      |                   |
|        |           |            | [address]      |                   |
|        |           |            | [place]      |                   |
|        |           |            | [address]  [phone]  |                   |
|        |           |            |  [phone] (Fax)  |                   |
+--------+-----------+------------+----------------------+-------------------+
 documented as of this encounter
 
 Visit Diagnoses
 Not on filedocumented in this encounter

## 2020-01-08 NOTE — XMS
Encounter Summary
  Created on: 2020
 
 Viviana Ricci
 External Reference #: [id number]
 : 47
 Sex: Female
 
 Demographics
 
 
+-----------------------+----------------------+
| Address               | [address]      |
|                       | [place]  [address] |
+-----------------------+----------------------+
| Home Phone            | [phone]      |
+-----------------------+----------------------+
| Preferred Language    | Unknown              |
+-----------------------+----------------------+
| Marital Status        | Single               |
+-----------------------+----------------------+
| Mandaen Affiliation | [id number]                 |
+-----------------------+----------------------+
| Race                  | Unknown              |
+-----------------------+----------------------+
| Ethnic Group          | Unknown              |
+-----------------------+----------------------+
 
 
 Author
 
 
+--------------+--------------------------------------------+
| Author       | [facility]  |
|              | and [place]                                |
+--------------+--------------------------------------------+
| Organization | [facility]  |
|              | and [place]                                |
+--------------+--------------------------------------------+
| Address      | Unknown                                    |
+--------------+--------------------------------------------+
| Phone        | Unavailable                                |
+--------------+--------------------------------------------+
 
 
 
 Support
 
 
+----------------+--------------+---------------------+-----------------+
| Name           | Relationship | Address             | Phone           |
+----------------+--------------+---------------------+-----------------+
| Ada/Ed Radhames | ECON         | [address]              | [phone] |
|                |              | [place]  |                 |
|                |              |  [address]              |                 |
+----------------+--------------+---------------------+-----------------+
 
 
 
 
 Care Team Providers
 
 
+-----------------------+------+-------------+
| Care Team Member Name | Role | Phone       |
+-----------------------+------+-------------+
 PCP  | Unavailable |
+-----------------------+------+-------------+
 
 
 
 Reason for Visit
 
 
+---------+----------+
| Reason  | Comments |
+---------+----------+
| Results |          |
+---------+----------+
 
 
 
 Encounter Details
 
 
+--------+-----------+----------------------+----------------+-------------+
| Date   | Type      | Department           | Care Team      | Description |
+--------+-----------+----------------------+----------------+-------------+
| 06/19/ | Telephone |   [facility]          |   [place],  | Results     |
|    |           | PULMONARY  [address]     | Nena GUSMAN MD  |             |
|        |           | [place]  [place], |                |             |
|        |           |  [place] [address]       |                |             |
|        |           | [phone]         |                |             |
+--------+-----------+----------------------+----------------+-------------+
 
 
 
 Social History
 
 
+--------------+-------+-----------+--------+------+
| Tobacco Use  | Types | Packs/Day | Years  | Date |
|              |       |           | Used   |      |
+--------------+-------+-----------+--------+------+
| Never Smoker |       |           |        |      |
+--------------+-------+-----------+--------+------+
 
 
 
+---------------------+---+---+---+
| Smokeless Tobacco:  |   |   |   |
| Never Used          |   |   |   |
+---------------------+---+---+---+
 
 
 
+---------------------------------------------------------------+
| Comments: some second hand smoke exposure, but fairly minimal |
+---------------------------------------------------------------+
 
 
 
 
+-------------+-------------+---------+----------+
| Alcohol Use | Drinks/Week | oz/Week | Comments |
+-------------+-------------+---------+----------+
| No          |             |         |          |
+-------------+-------------+---------+----------+
 
 
 
+------------------+---------------+
| Sex Assigned at  | Date Recorded |
| Birth            |               |
+------------------+---------------+
| Not on file      |               |
+------------------+---------------+
 
 
 
+----------------+-------------+-------------+
| Job Start Date | Occupation  | Industry    |
+----------------+-------------+-------------+
| Not on file    | Not on file | Not on file |
+----------------+-------------+-------------+
 
 
 
+----------------+--------------+------------+
| Travel History | Travel Start | Travel End |
+----------------+--------------+------------+
 
 
 
+-------------------------------------+
| No recent travel history available. |
+-------------------------------------+
 documented as of this encounter
 
 Plan of Treatment
 
 
+--------+-----------+------------+----------------------+-------------------+
| Date   | Type      | Specialty  | Care Team            | Description       |
+--------+-----------+------------+----------------------+-------------------+
| / | Office    | Cardiology |   Tonia Wilson,    |                   |
|    | Visit     |            | ARNP  [address]   |                   |
|        |           |            | [place]  |                   |
|        |           |            | [address]  [phone]  |                   |
|        |           |            |  [phone] (Fax)  |                   |
+--------+-----------+------------+----------------------+-------------------+
| / | Hospital  | Radiology  |   Popeye Townsend, |                   |
2020   | Encounter |            |  MD  [address] [place] |                   |
|        |           |            |  St.  [place],   |                   |
|        |           |            | [place] [address]             |                   |
|        |           |            | [phone]         |                   |
|        |           |            | [phone] (Fax)   |                   |
+--------+-----------+------------+----------------------+-------------------+
| / | Surgery   | Radiology  |   Popeye Townsend, | CV EP PPM SYSTEM  |
|    |           |            |  MD  [address] | IMPLANT           |
 
|        |           |            |  St.  [place],   |                   |
|        |           |            | [place] [address]             |                   |
|        |           |            | [phone]         |                   |
|        |           |            | [phone] (Fax)   |                   |
+--------+-----------+------------+----------------------+-------------------+
| 02/10/ | Clinical  | Cardiology |                      |                   |
2020   | Support   |            |                      |                   |
+--------+-----------+------------+----------------------+-------------------+
| / | Office    | Cardiology |   Tonia Wilson,    |                   |
|    | Visit     |            | ARNP  [address]   |                   |
|        |           |            | St  [place]  |                   |
|        |           |            | [address]  [phone]  |                   |
|        |           |            |  [phone] (Fax)  |                   |
+--------+-----------+------------+----------------------+-------------------+
| / | Off-Site  | Nephrology |   Marzena Moser  |                   |
|    | Visit     |            | DO ETIENNE  [address]      |                   |
|        |           |            | [address]      |                   |
|        |           |            | [place]      |                   |
|        |           |            | [address]  [phone]  |                   |
|        |           |            |  [phone] (Fax)  |                   |
+--------+-----------+------------+----------------------+-------------------+
 
 
 
+----------------+-------------+--------+----------------------+----------------------+
| Name           | Type        | Priori | Associated Diagnoses | Order Schedule       |
|                |             | ty     |                      |                      |
+----------------+-------------+--------+----------------------+----------------------+
| TSH            | Lab         | Routin |   Fatigue            | 1 Occurrences        |
|                |             | e      |                      | starting 2013  |
|                |             |        |                      | until 2014     |
+----------------+-------------+--------+----------------------+----------------------+
| Oxygen Therapy | Respiratory | Routin |   Hypoxemia          | Expected:            |
|                |  Care       | e      |                      | 2013, Expires: |
|                |             |        |                      |  2014          |
+----------------+-------------+--------+----------------------+----------------------+
 documented as of this encounter
 
 Visit Diagnoses
 
 
+------------------------------------------------+
| Diagnosis                                      |
+------------------------------------------------+
|   Fatigue - Primary  Other malaise and fatigue |
+------------------------------------------------+
|   Hypoxemia                                    |
+------------------------------------------------+
 documented in this encounter

## 2020-01-08 NOTE — XMS
Encounter Summary
  Created on: 2020
 
 Viviana Ricci
 External Reference #: [id number]
 : 47
 Sex: Female
 
 Demographics
 
 
+-----------------------+----------------------+
| Address               | [address]      |
|                       | [place]  [address] |
+-----------------------+----------------------+
| Home Phone            | [phone]      |
+-----------------------+----------------------+
| Preferred Language    | Unknown              |
+-----------------------+----------------------+
| Marital Status        | Single               |
+-----------------------+----------------------+
| Mosque Affiliation | 1009                 |
+-----------------------+----------------------+
| Race                  | Unknown              |
+-----------------------+----------------------+
| Ethnic Group          | Unknown              |
+-----------------------+----------------------+
 
 
 Author
 
 
+--------------+--------------------------------------------+
| Author       | [facility]  |
|              | and [place]                                |
+--------------+--------------------------------------------+
| Organization | [facility]  |
|              | and [place]                                |
+--------------+--------------------------------------------+
| Address      | Unknown                                    |
+--------------+--------------------------------------------+
| Phone        | Unavailable                                |
+--------------+--------------------------------------------+
 
 
 
 Support
 
 
+----------------+--------------+---------------------+-----------------+
| Name           | Relationship | Address             | Phone           |
+----------------+--------------+---------------------+-----------------+
| Ada/Ed Radhames | ECON         | [place]              | [phone] |
|                |              | [place]  |                 |
|                |              |  [address]              |                 |
+----------------+--------------+---------------------+-----------------+
 
 
 
 
 Care Team Providers
 
 
+-----------------------+------+-------------+
| Care Team Member Name | Role | Phone       |
+-----------------------+------+-------------+
 PCP  | Unavailable |
+-----------------------+------+-------------+
 
 
 
 Reason for Visit
 
 
+-------------------+----------+
| Reason            | Comments |
+-------------------+----------+
| Medication Refill |          |
+-------------------+----------+
 
 
 
 Encounter Details
 
 
+--------+--------+---------------------+----------------------+-------------------+
| Date   | Type   | Department          | Care Team            | Description       |
+--------+--------+---------------------+----------------------+-------------------+
| 10/27/ | Refill |   [place]         |   Marzena Moser  | Medication Refill |
| 2017   |        | NEPHROLOGY  [address]   | M, DO  [address]      |                   |
|        |        | [address]   | [address]      |                   |
|        |        | [place], [place]     | [place], [place]      |                   |
|        |        | [address]          | [address]  [phone]  |                   |
|        |        | [phone]        |  [phone] (Fax)  |                   |
+--------+--------+---------------------+----------------------+-------------------+
 
 
 
 Social History
 
 
+--------------+-------+-----------+--------+------+
| Tobacco Use  | Types | Packs/Day | Years  | Date |
|              |       |           | Used   |      |
+--------------+-------+-----------+--------+------+
| Never Smoker |       |           |        |      |
+--------------+-------+-----------+--------+------+
 
 
 
+---------------------+---+---+---+
| Smokeless Tobacco:  |   |   |   |
| Never Used          |   |   |   |
+---------------------+---+---+---+
 
 
 
+---------------------------------------------------------------+
| Comments: some second hand smoke exposure, but fairly minimal |
 
+---------------------------------------------------------------+
 
 
 
+-------------+-------------+---------+----------+
| Alcohol Use | Drinks/Week | oz/Week | Comments |
+-------------+-------------+---------+----------+
| No          |             |         |          |
+-------------+-------------+---------+----------+
 
 
 
+------------------+---------------+
| Sex Assigned at  | Date Recorded |
| Birth            |               |
+------------------+---------------+
| Not on file      |               |
+------------------+---------------+
 
 
 
+----------------+-------------+-------------+
| Job Start Date | Occupation  | Industry    |
+----------------+-------------+-------------+
| Not on file    | Not on file | Not on file |
+----------------+-------------+-------------+
 
 
 
+----------------+--------------+------------+
| Travel History | Travel Start | Travel End |
+----------------+--------------+------------+
 
 
 
+-------------------------------------+
| No recent travel history available. |
+-------------------------------------+
 documented as of this encounter
 
 Plan of Treatment
 
 
+--------+-----------+------------+----------------------+-------------------+
| Date   | Type      | Specialty  | Care Team            | Description       |
+--------+-----------+------------+----------------------+-------------------+
| / | Office    | Cardiology |   HellalfredoTonia,    |                   |
|    | Visit     |            | ARNP  [address]   |                   |
|        |           |            | St  [place], [place]  |                   |
|        |           |            | [address]  [phone]  |                   |
|        |           |            |  [phone] (Fax)  |                   |
+--------+-----------+------------+----------------------+-------------------+
| / | Hospital  | Radiology  |   Popeye Townsend, |                   |
|    | Encounter |            |  MD  [address] |                   |
|        |           |            |  St.  [place],   |                   |
|        |           |            | [place] [address]             |                   |
|        |           |            | [phone]         |                   |
|        |           |            | [phone] (Fax)   |                   |
+--------+-----------+------------+----------------------+-------------------+
| / | Surgery   | Radiology  |   Popeye Townsend, | CV EP PPM SYSTEM  |
 
|    |           |            |  MD  [address] | IMPLANT           |
|        |           |            |  St.  [place],   |                   |
|        |           |            | [place] [address]             |                   |
|        |           |            | [phone]         |                   |
|        |           |            | [phone] (Fax)   |                   |
+--------+-----------+------------+----------------------+-------------------+
| 02/10/ | Clinical  | Cardiology |                      |                   |
|    | Support   |            |                      |                   |
+--------+-----------+------------+----------------------+-------------------+
| / | Office    | Cardiology |   Tonia Wilson,    |                   |
|    | Visit     |            | ARNP  [address]   |                   |
|        |           |            | [place]  |                   |
|        |           |            | [address]  [phone]  |                   |
|        |           |            |  [phone] (Fax)  |                   |
+--------+-----------+------------+----------------------+-------------------+
| / | Off-Site  | Nephrology |   Marzena Moser  |                   |
|    | Visit     |            | DO ETIENNE  [address] [place] [address]      |                   |
|        |           |            | [address]      |                   |
|        |           |            | [place]      |                   |
|        |           |            | [address]  [phone]  |                   |
|        |           |            |  [phone] (Fax)  |                   |
+--------+-----------+------------+----------------------+-------------------+
 documented as of this encounter
 
 Visit Diagnoses
 Not on filedocumented in this encounter

## 2020-01-08 NOTE — XMS
Encounter Summary
  Created on: 2020
 
 Viviana Ricci
 External Reference #: [id number]
 : 47
 Sex: Female
 
 Demographics
 
 
+-----------------------+----------------------+
| Address               | [address]      |
|                       | [place]  [address] |
+-----------------------+----------------------+
| Home Phone            | [phone]      |
+-----------------------+----------------------+
| Preferred Language    | Unknown              |
+-----------------------+----------------------+
| Marital Status        | Single               |
+-----------------------+----------------------+
| Buddhist Affiliation | 1009                 |
+-----------------------+----------------------+
| Race                  | Unknown              |
+-----------------------+----------------------+
| Ethnic Group          | Unknown              |
+-----------------------+----------------------+
 
 
 Author
 
 
+--------------+--------------------------------------------+
| Author       | [facility]  |
|              | and [facility]                                |
+--------------+--------------------------------------------+
| Organization | [facility]  |
|              | and [facility]                                |
+--------------+--------------------------------------------+
| Address      | Unknown                                    |
+--------------+--------------------------------------------+
| Phone        | Unavailable                                |
+--------------+--------------------------------------------+
 
 
 
 Support
 
 
+----------------+--------------+---------------------+-----------------+
| Name           | Relationship | Address             | Phone           |
+----------------+--------------+---------------------+-----------------+
| Ada/Ed Radhames | ECON         | [place]              | [phone] |
|                |              | [place]  |                 |
|                |              |  [address]              |                 |
+----------------+--------------+---------------------+-----------------+
 
 
 
 
 Care Team Providers
 
 
+-----------------------+------+-------------+
| Care Team Member Name | Role | Phone       |
+-----------------------+------+-------------+
 PCP  | Unavailable |
+-----------------------+------+-------------+
 
 
 
 Reason for Visit
 
 
+-------------------+----------+
| Reason            | Comments |
+-------------------+----------+
| Medication Refill |          |
+-------------------+----------+
 
 
 
 Encounter Details
 
 
+--------+--------+---------------------+----------------------+-------------------+
| Date   | Type   | Department          | Care Team            | Description       |
+--------+--------+---------------------+----------------------+-------------------+
| / | Refill |   [facility]         |   Marzena Moser  | Medication Refill |
|    |        | NEPHROLOGY  [address]   | M, DO  [address]      |                   |
|        |        | [address]   | [address]      |                   |
|        |        | [place], [place]     | [place], [place]      |                   |
|        |        | [address]          | [address]  [phone]  |                   |
|        |        | [phone]        |  [phone] (Fax)  |                   |
+--------+--------+---------------------+----------------------+-------------------+
 
 
 
 Social History
 
 
+--------------+-------+-----------+--------+------+
| Tobacco Use  | Types | Packs/Day | Years  | Date |
|              |       |           | Used   |      |
+--------------+-------+-----------+--------+------+
| Never Smoker |       |           |        |      |
+--------------+-------+-----------+--------+------+
 
 
 
+---------------------+---+---+---+
| Smokeless Tobacco:  |   |   |   |
| Never Used          |   |   |   |
+---------------------+---+---+---+
 
 
 
+---------------------------------------------------------------+
| Comments: some second hand smoke exposure, but fairly minimal |
 
+---------------------------------------------------------------+
 
 
 
+-------------+-------------+---------+----------+
| Alcohol Use | Drinks/Week | oz/Week | Comments |
+-------------+-------------+---------+----------+
| No          |             |         |          |
+-------------+-------------+---------+----------+
 
 
 
+------------------+---------------+
| Sex Assigned at  | Date Recorded |
| Birth            |               |
+------------------+---------------+
| Not on file      |               |
+------------------+---------------+
 
 
 
+----------------+-------------+-------------+
| Job Start Date | Occupation  | Industry    |
+----------------+-------------+-------------+
| Not on file    | Not on file | Not on file |
+----------------+-------------+-------------+
 
 
 
+----------------+--------------+------------+
| Travel History | Travel Start | Travel End |
+----------------+--------------+------------+
 
 
 
+-------------------------------------+
| No recent travel history available. |
+-------------------------------------+
 documented as of this encounter
 
 Plan of Treatment
 
 
+--------+-----------+------------+----------------------+-------------------+
| Date   | Type      | Specialty  | Care Team            | Description       |
+--------+-----------+------------+----------------------+-------------------+
| / | Office    | Cardiology |   HellalfredoTonia,    |                   |
|    | Visit     |            | ARNP  [address]   |                   |
|        |           |            | St  [place], [place]  |                   |
|        |           |            | [address]  [phone]  |                   |
|        |           |            |  [phone] (Fax)  |                   |
+--------+-----------+------------+----------------------+-------------------+
| / | Hospital  | Radiology  |   Popeye Townsend, |                   |
|    | Encounter |            |  MD  [address] |                   |
|        |           |            |  St.  [place],   |                   |
|        |           |            | [place] [address]             |                   |
|        |           |            | [phone]         |                   |
|        |           |            | [phone] (Fax)   |                   |
+--------+-----------+------------+----------------------+-------------------+
| / | Surgery   | Radiology  |   Popeye Townsend, | CV EP PPM SYSTEM  |
 
|    |           |            |  MD  [address] | IMPLANT           |
|        |           |            |  St.  [place],   |                   |
|        |           |            | [place] [address]             |                   |
|        |           |            | [phone]         |                   |
|        |           |            | [phone] (Fax)   |                   |
+--------+-----------+------------+----------------------+-------------------+
| 02/10/ | Clinical  | Cardiology |                      |                   |
|    | Support   |            |                      |                   |
+--------+-----------+------------+----------------------+-------------------+
| / | Office    | Cardiology |   Tonia Wilson,    |                   |
|    | Visit     |            | ARNP  [address]   |                   |
|        |           |            | [place]  |                   |
|        |           |            | [address]  [phone]  |                   |
|        |           |            |  [phone] (Fax)  |                   |
+--------+-----------+------------+----------------------+-------------------+
| / | Off-Site  | Nephrology |   Marzena Moser  |                   |
|    | Visit     |            | DO ETIENNE  [address] [place] [address]      |                   |
|        |           |            | [address]      |                   |
|        |           |            | [place]      |                   |
|        |           |            | [address]  [phone]  |                   |
|        |           |            |  [phone] (Fax)  |                   |
+--------+-----------+------------+----------------------+-------------------+
 documented as of this encounter
 
 Visit Diagnoses
 Not on filedocumented in this encounter

## 2020-01-08 NOTE — XMS
Encounter Summary
  Created on: 2020
 
 Viviana Ricci
 External Reference #: [id number]
 : 47
 Sex: Female
 
 Demographics
 
 
+-----------------------+----------------------+
| Address               | [address]      |
|                       | [place]  [address] |
+-----------------------+----------------------+
| Home Phone            | [phone]      |
+-----------------------+----------------------+
| Preferred Language    | Unknown              |
+-----------------------+----------------------+
| Marital Status        | Single               |
+-----------------------+----------------------+
| Latter day Affiliation | 1009                 |
+-----------------------+----------------------+
| Race                  | Unknown              |
+-----------------------+----------------------+
| Ethnic Group          | Unknown              |
+-----------------------+----------------------+
 
 
 Author
 
 
+--------------+--------------------------------------------+
| Author       | [facility]  |
|              | and [facility]                                |
+--------------+--------------------------------------------+
| Organization | [facility]  |
|              | and [facility]                                |
+--------------+--------------------------------------------+
| Address      | Unknown                                    |
+--------------+--------------------------------------------+
| Phone        | Unavailable                                |
+--------------+--------------------------------------------+
 
 
 
 Support
 
 
+----------------+--------------+---------------------+-----------------+
| Name           | Relationship | Address             | Phone           |
+----------------+--------------+---------------------+-----------------+
| Ada/Ed Radhames | ECON         | [address]              | [phone] |
|                |              | [place]  |                 |
|                |              |  [address]              |                 |
+----------------+--------------+---------------------+-----------------+
 
 
 
 
 Care Team Providers
 
 
+-----------------------+------+-------------+
| Care Team Member Name | Role | Phone       |
+-----------------------+------+-------------+
 PCP  | Unavailable |
+-----------------------+------+-------------+
 
 
 
 Reason for Visit
 
 
+--------------------+---------------------+
| Reason             | Comments            |
+--------------------+---------------------+
| Medication Problem | Tacrolimus too high |
+--------------------+---------------------+
 
 
 
 Encounter Details
 
 
+--------+--------+---------------------+----------------------+---------------------+
| Date   | Type   | Department          | Care Team            | Description         |
+--------+--------+---------------------+----------------------+---------------------+
| 10/02/ | Refill |   [place]         |   Marzena Moser  | Medication Problem  |
|    |        | NEPHROLOGY  [address]   | M, DO  [address]      | (Tacrolimus too     |
|        |        | [address]   | [address]      | high)               |
|        |        | [place], [place]     | [place]      |                     |
|        |        | [address]          | [address]  [phone]  |                     |
|        |        | [phone]        |  [phone] (Fax)  |                     |
+--------+--------+---------------------+----------------------+---------------------+
 
 
 
 Social History
 
 
+----------------+-------+-----------+--------+------+
| Tobacco Use    | Types | Packs/Day | Years  | Date |
|                |       |           | Used   |      |
+----------------+-------+-----------+--------+------+
| Never Assessed |       |           |        |      |
+----------------+-------+-----------+--------+------+
 
 
 
+------------------+---------------+
| Sex Assigned at  | Date Recorded |
| Birth            |               |
+------------------+---------------+
| Not on file      |               |
+------------------+---------------+
 
 
 
 
+----------------+-------------+-------------+
| Job Start Date | Occupation  | Industry    |
+----------------+-------------+-------------+
| Not on file    | Not on file | Not on file |
+----------------+-------------+-------------+
 
 
 
+----------------+--------------+------------+
| Travel History | Travel Start | Travel End |
+----------------+--------------+------------+
 
 
 
+-------------------------------------+
| No recent travel history available. |
+-------------------------------------+
 documented as of this encounter
 
 Plan of Treatment
 
 
+--------+-----------+------------+----------------------+-------------------+
| Date   | Type      | Specialty  | Care Team            | Description       |
+--------+-----------+------------+----------------------+-------------------+
| / | Office    | Cardiology |   Tonia Wilson,    |                   |
2020   | Visit     |            | ARNP  [address]   |                   |
|        |           |            | [place]  |                   |
|        |           |            | [address]  [phone]  |                   |
|        |           |            |  [phone] (Fax)  |                   |
+--------+-----------+------------+----------------------+-------------------+
| / | Hospital  | Radiology  |   Popeye Townsend, |                   |
|    | Encounter |            |  MD  [address] |                   |
|        |           |            |  [place]. [place]   |                   |
|        |           |            | [place] [address]             |                   |
|        |           |            | [phone]         |                   |
|        |           |            | [phone] (Fax)   |                   |
+--------+-----------+------------+----------------------+-------------------+
| / | Surgery   | Radiology  |   Popeye Townsend, | CV EP PPM SYSTEM  |
2020   |           |            |  MD  [address] | IMPLANT           |
|        |           |            |  St.  [place],   |                   |
|        |           |            | [place] [address]             |                   |
|        |           |            | [phone]         |                   |
|        |           |            | [phone] (Fax)   |                   |
+--------+-----------+------------+----------------------+-------------------+
| 02/10/ | Clinical  | Cardiology |                      |                   |
|    | Support   |            |                      |                   |
+--------+-----------+------------+----------------------+-------------------+
| / | Office    | Cardiology |   Tonia Wilson,    |                   |
|    | Visit     |            | [facility]  [address]   |                   |
|        |           |            | [place]  |                   |
|        |           |            | [address]  [phone]  |                   |
|        |           |            |  [phone] (Fax)  |                   |
+--------+-----------+------------+----------------------+-------------------+
| / | Off-Site  | Nephrology |   Marzena Moser  |                   |
2020   | Visit     |            | DO ETIENNE  [address]      |                   |
|        |           |            | [address]      |                   |
|        |           |            | [place]      |                   |
|        |           |            | [address]  [phone]  |                   |
|        |           |            |  [phone] (Fax)  |                   |
 
+--------+-----------+------------+----------------------+-------------------+
 documented as of this encounter
 
 Visit Diagnoses
 Not on filedocumented in this encounter

## 2020-01-08 NOTE — XMS
Encounter Summary
  Created on: 2020
 
 Viviana Ricci
 External Reference #: [id number]
 : 47
 Sex: Female
 
 Demographics
 
 
+-----------------------+----------------------+
| Address               | [address]      |
|                       | [place]  [address] |
+-----------------------+----------------------+
| Home Phone            | [phone]      |
+-----------------------+----------------------+
| Preferred Language    | Unknown              |
+-----------------------+----------------------+
| Marital Status        | Single               |
+-----------------------+----------------------+
| Mu-ism Affiliation | [id number]                 |
+-----------------------+----------------------+
| Race                  | Unknown              |
+-----------------------+----------------------+
| Ethnic Group          | Unknown              |
+-----------------------+----------------------+
 
 
 Author
 
 
+--------------+--------------------------------------------+
| Author       | [facility]  |
|              | and [place]                                |
+--------------+--------------------------------------------+
| Organization | [facility]  |
|              | and [place]                                |
+--------------+--------------------------------------------+
| Address      | Unknown                                    |
+--------------+--------------------------------------------+
| Phone        | Unavailable                                |
+--------------+--------------------------------------------+
 
 
 
 Support
 
 
+----------------+--------------+---------------------+-----------------+
| Name           | Relationship | Address             | Phone           |
+----------------+--------------+---------------------+-----------------+
| Ada/Ed Radhames | ECON         | [address]              | [phone] |
|                |              | [place]  |                 |
|                |              |  [address]              |                 |
+----------------+--------------+---------------------+-----------------+
 
 
 
 
 Care Team Providers
 
 
+-----------------------+------+-------------+
| Care Team Member Name | Role | Phone       |
+-----------------------+------+-------------+
 PCP  | Unavailable |
+-----------------------+------+-------------+
 
 
 
 Encounter Details
 
 
+--------+-------------+----------------------+----------------------+-------------------+
| Date   | Type        | Department           | Care Team            | Description       |
+--------+-------------+----------------------+----------------------+-------------------+
| / | Orders Only |   [facility] [place]          |   Andres Avina    | JACK (obstructive  |
| 2018   |             | PULMONARY  [address]     | [address]    | sleep apnea)      |
|        |             | [place], | [address]  [place],  | (Primary Dx)      |
|        |             |  [place] [address]       | [place] [address]             |                   |
|        |             | [phone]         | [phone]         |                   |
|        |             |                      | [phone] (Fax)   |                   |
+--------+-------------+----------------------+----------------------+-------------------+
 
 
 
 Social History
 
 
+--------------+-------+-----------+--------+------+
| Tobacco Use  | Types | Packs/Day | Years  | Date |
|              |       |           | Used   |      |
+--------------+-------+-----------+--------+------+
| Never Smoker |       |           |        |      |
+--------------+-------+-----------+--------+------+
 
 
 
+---------------------+---+---+---+
| Smokeless Tobacco:  |   |   |   |
| Never Used          |   |   |   |
+---------------------+---+---+---+
 
 
 
+---------------------------------------------------------------+
| Comments: some second hand smoke exposure, but fairly minimal |
+---------------------------------------------------------------+
 
 
 
+-------------+-------------+---------+----------+
| Alcohol Use | Drinks/Week | oz/Week | Comments |
+-------------+-------------+---------+----------+
| No          |             |         |          |
+-------------+-------------+---------+----------+
 
 
 
 
+------------------+---------------+
| Sex Assigned at  | Date Recorded |
| Birth            |               |
+------------------+---------------+
| Not on file      |               |
+------------------+---------------+
 
 
 
+----------------+-------------+-------------+
| Job Start Date | Occupation  | Industry    |
+----------------+-------------+-------------+
| Not on file    | Not on file | Not on file |
+----------------+-------------+-------------+
 
 
 
+----------------+--------------+------------+
| Travel History | Travel Start | Travel End |
+----------------+--------------+------------+
 
 
 
+-------------------------------------+
| No recent travel history available. |
+-------------------------------------+
 documented as of this encounter
 
 Plan of Treatment
 
 
+--------+-----------+------------+----------------------+-------------------+
| Date   | Type      | Specialty  | Care Team            | Description       |
+--------+-----------+------------+----------------------+-------------------+
| / | Office    | Cardiology |   Tonia Wilson,    |                   |
|    | Visit     |            | [address]   |                   |
|        |           |            | St  [place]  |                   |
|        |           |            | [address]  [phone]  |                   |
|        |           |            |  [phone] (Fax)  |                   |
+--------+-----------+------------+----------------------+-------------------+
| / | Hospital  | Radiology  |   Popeye Townsend, |                   |
|    | Encounter |            |  MD  [address] |                   |
|        |           |            |  St.  [place],   |                   |
|        |           |            | [place] [address]             |                   |
|        |           |            | [phone]         |                   |
|        |           |            | [phone] (Fax)   |                   |
+--------+-----------+------------+----------------------+-------------------+
| / | Surgery   | Radiology  |   Popeye Townsend, | CV EP PPM SYSTEM  |
|    |           |            |  MD  [address] | IMPLANT           |
|        |           |            |  St.  [place],   |                   |
|        |           |            | [place] [address]             |                   |
|        |           |            | [phone]         |                   |
|        |           |            | [phone] (Fax)   |                   |
+--------+-----------+------------+----------------------+-------------------+
| 02/10/ | Clinical  | Cardiology |                      |                   |
2020   | Support   |            |                      |                   |
+--------+-----------+------------+----------------------+-------------------+
| / | Office    | Cardiology |   Tonia Wilson,    |                   |
|    | Visit     |            | ARNP  [address]   |                   |
 
|        |           |            | St  [place]  |                   |
|        |           |            | [address]  [phone]  |                   |
|        |           |            |  [phone] (Fax)  |                   |
+--------+-----------+------------+----------------------+-------------------+
| / | Off-Site  | Nephrology |   Marzena Moser  |                   |
|    | Visit     |            | DO ETIENNE  [address]      |                   |
|        |           |            | [address]      |                   |
|        |           |            | [place]      |                   |
|        |           |            | [address]  [phone]  |                   |
|        |           |            |  [phone] (Fax)  |                   |
+--------+-----------+------------+----------------------+-------------------+
 documented as of this encounter
 
 Visit Diagnoses
 
 
+----------------------------------------------------------------------------------------+
| Diagnosis                                                                              |
+----------------------------------------------------------------------------------------+
|   JACK (obstructive sleep apnea) - Primary  Obstructive sleep apnea (adult) (pediatric) |
+----------------------------------------------------------------------------------------+
 documented in this encounter

## 2020-01-08 NOTE — XMS
Encounter Summary
  Created on: 2020
 
 Viviana Ricci
 External Reference #: [id number]
 : 47
 Sex: Female
 
 Demographics
 
 
+-----------------------+----------------------+
| Address               | [address]      |
|                       | [place]  [address] |
+-----------------------+----------------------+
| Home Phone            | [phone]      |
+-----------------------+----------------------+
| Preferred Language    | Unknown              |
+-----------------------+----------------------+
| Marital Status        | Single               |
+-----------------------+----------------------+
| Gnosticist Affiliation | [id number]                 |
+-----------------------+----------------------+
| Race                  | Unknown              |
+-----------------------+----------------------+
| Ethnic Group          | Unknown              |
+-----------------------+----------------------+
 
 
 Author
 
 
+--------------+--------------------------------------------+
| Author       | [facility]  |
|              | and [place]                                |
+--------------+--------------------------------------------+
| Organization | [facility]  |
|              | and [place]                                |
+--------------+--------------------------------------------+
| Address      | Unknown                                    |
+--------------+--------------------------------------------+
| Phone        | Unavailable                                |
+--------------+--------------------------------------------+
 
 
 
 Support
 
 
+----------------+--------------+---------------------+-----------------+
| Name           | Relationship | Address             | Phone           |
+----------------+--------------+---------------------+-----------------+
| Ada/Ed Radhames | ECON         | [address]              | [phone] |
|                |              | [place]  |                 |
|                |              |  [address]              |                 |
+----------------+--------------+---------------------+-----------------+
 
 
 
 
 Care Team Providers
 
 
+------------------------+------+-----------------+
| Care Team Member Name  | Role | Phone           |
+------------------------+------+-----------------+
| Marzena Moser DO | PCP  | [phone] |
+------------------------+------+-----------------+
 
 
 
 Reason for Visit
 
 
+---------+----------+
| Reason  | Comments |
+---------+----------+
| Results |          |
+---------+----------+
 
 
 
 Encounter Details
 
 
+--------+-----------+----------------------+----------------------+-------------+
| Date   | Type      | Department           | Care Team            | Description |
+--------+-----------+----------------------+----------------------+-------------+
| 10/31/ | Telephone |   [facility] [place]          |   Tonia Wilson,    | Results     |
| 2019   |           | CARDIOLOGY  [address]    | ARNP  [address]   |             |
|        |           | [address]  [place], | St  [place], [place]  |             |
|        |           |  [place] [address]       | [address]  [phone]  |             |
|        |           | [phone]         |  [phone] (Fax)  |             |
+--------+-----------+----------------------+----------------------+-------------+
 
 
 
 Social History
 
 
+--------------+-------+-----------+--------+------+
| Tobacco Use  | Types | Packs/Day | Years  | Date |
|              |       |           | Used   |      |
+--------------+-------+-----------+--------+------+
| Never Smoker |       |           |        |      |
+--------------+-------+-----------+--------+------+
 
 
 
+---------------------+---+---+---+
| Smokeless Tobacco:  |   |   |   |
| Never Used          |   |   |   |
+---------------------+---+---+---+
 
 
 
+---------------------------------------------------------------+
| Comments: some second hand smoke exposure, but fairly minimal |
+---------------------------------------------------------------+
 
 
 
 
+-------------+-------------+---------+----------+
| Alcohol Use | Drinks/Week | oz/Week | Comments |
+-------------+-------------+---------+----------+
| No          |             |         |          |
+-------------+-------------+---------+----------+
 
 
 
+------------------+---------------+
| Sex Assigned at  | Date Recorded |
| Birth            |               |
+------------------+---------------+
| Not on file      |               |
+------------------+---------------+
 
 
 
+----------------+-------------+-------------+
| Job Start Date | Occupation  | Industry    |
+----------------+-------------+-------------+
| Not on file    | Not on file | Not on file |
+----------------+-------------+-------------+
 
 
 
+----------------+--------------+------------+
| Travel History | Travel Start | Travel End |
+----------------+--------------+------------+
 
 
 
+-------------------------------------+
| No recent travel history available. |
+-------------------------------------+
 documented as of this encounter
 
 Plan of Treatment
 
 
+--------+-----------+------------+----------------------+-------------------+
| Date   | Type      | Specialty  | Care Team            | Description       |
+--------+-----------+------------+----------------------+-------------------+
| / | Office    | Cardiology |   Tonia Wilson,    |                   |
|    | Visit     |            | ARNJESSICA  [address]   |                   |
|        |           |            | St  [place], [place]  |                   |
|        |           |            | [address]  [phone]  |                   |
|        |           |            |  [phone] (Fax)  |                   |
+--------+-----------+------------+----------------------+-------------------+
| / | Hospital  | Radiology  |   Popeye Townsend, |                   |
|    | Encounter |            |  MD  [address] |                   |
|        |           |            |  St.  [place],   |                   |
|        |           |            | [place] [address]             |                   |
|        |           |            | [phone]         |                   |
|        |           |            | [phone] (Fax)   |                   |
+--------+-----------+------------+----------------------+-------------------+
| / | Surgery   | Radiology  |   Popeye Townsend, | CV EP PPM SYSTEM  |
|    |           |            |  MD  [address] | IMPLANT           |
 
|        |           |            |  St.  [place],   |                   |
|        |           |            | [place] [address]             |                   |
|        |           |            | [phone]         |                   |
|        |           |            | [phone] (Fax)   |                   |
+--------+-----------+------------+----------------------+-------------------+
| 02/10/ | Clinical  | Cardiology |                      |                   |
|    | Support   |            |                      |                   |
+--------+-----------+------------+----------------------+-------------------+
| / | Office    | Cardiology |   Tonia Wilson,    |                   |
|    | Visit     |            | BELKIS  [address]   |                   |
|        |           |            | [place]  |                   |
|        |           |            | [address]  [phone]  |                   |
|        |           |            |  [phone] (Fax)  |                   |
+--------+-----------+------------+----------------------+-------------------+
| / | Off-Site  | Nephrology |   Marzena Moser  |                   |
|    | Visit     |            | DO ETIENNE  [address] [place] [address]      |                   |
|        |           |            | [address]      |                   |
|        |           |            | [place]      |                   |
|        |           |            | [address] [phone]  |                   |
|        |           |            |  [phone] (Fax)  |                   |
+--------+-----------+------------+----------------------+-------------------+
 documented as of this encounter
 
 Visit Diagnoses
 Not on filedocumented in this encounter

## 2020-01-08 NOTE — XMS
Encounter Summary
  Created on: 2020
 
 Viviana Ricci
 External Reference #: [id number]
 : 47
 Sex: Female
 
 Demographics
 
 
+-----------------------+----------------------+
| Address               | [address]      |
|                       | [place]  [address] |
+-----------------------+----------------------+
| Home Phone            | [phone]      |
+-----------------------+----------------------+
| Preferred Language    | Unknown              |
+-----------------------+----------------------+
| Marital Status        | Single               |
+-----------------------+----------------------+
| Cheondoism Affiliation | [id number]                 |
+-----------------------+----------------------+
| Race                  | Unknown              |
+-----------------------+----------------------+
| Ethnic Group          | Unknown              |
+-----------------------+----------------------+
 
 
 Author
 
 
+--------------+--------------------------------------------+
| Author       | [facility]  |
|              | and [facility]                                |
+--------------+--------------------------------------------+
| Organization | [facility]  |
|              | and [facility]                                |
+--------------+--------------------------------------------+
| Address      | Unknown                                    |
+--------------+--------------------------------------------+
| Phone        | Unavailable                                |
+--------------+--------------------------------------------+
 
 
 
 Support
 
 
+----------------+--------------+---------------------+-----------------+
| Name           | Relationship | Address             | Phone           |
+----------------+--------------+---------------------+-----------------+
| Ada/Ed Radhames | ECON         | [address]              | [phone] |
|                |              | [place]  |                 |
|                |              |  [address]              |                 |
+----------------+--------------+---------------------+-----------------+
 
 
 
 
 Care Team Providers
 
 
+-----------------------+------+-------------+
| Care Team Member Name | Role | Phone       |
+-----------------------+------+-------------+
 PCP  | Unavailable |
+-----------------------+------+-------------+
 
 
 
 Encounter Details
 
 
+--------+-------------+---------------------+----------------------+---------------------+
| Date   | Type        | Department          | Care Team            | Description         |
+--------+-------------+---------------------+----------------------+---------------------+
| / | Orders Only |   [facility]         |   Marzena Moser  | UTI (lower urinary  |
|    |             | NEPHROLOGY  [address]   | M, DO  [address]      | tract infection)    |
|        |             | [address]   | [place]      | (Primary Dx)        |
|        |             | [place], [place]     | [place], [place]      |                     |
|        |             | [address]          | [address]  [phone]  |                     |
|        |             | [phone]        |  [phone] (Fax)  |                     |
+--------+-------------+---------------------+----------------------+---------------------+
 
 
 
 Social History
 
 
+--------------+-------+-----------+--------+------+
| Tobacco Use  | Types | Packs/Day | Years  | Date |
|              |       |           | Used   |      |
+--------------+-------+-----------+--------+------+
| Never Smoker |       |           |        |      |
+--------------+-------+-----------+--------+------+
 
 
 
+---------------------+---+---+---+
| Smokeless Tobacco:  |   |   |   |
| Never Used          |   |   |   |
+---------------------+---+---+---+
 
 
 
+---------------------------------------------------------------+
| Comments: some second hand smoke exposure, but fairly minimal |
+---------------------------------------------------------------+
 
 
 
+-------------+-------------+---------+----------+
| Alcohol Use | Drinks/Week | oz/Week | Comments |
+-------------+-------------+---------+----------+
| No          |             |         |          |
+-------------+-------------+---------+----------+
 
 
 
 
+------------------+---------------+
| Sex Assigned at  | Date Recorded |
| Birth            |               |
+------------------+---------------+
| Not on file      |               |
+------------------+---------------+
 
 
 
+----------------+-------------+-------------+
| Job Start Date | Occupation  | Industry    |
+----------------+-------------+-------------+
| Not on file    | Not on file | Not on file |
+----------------+-------------+-------------+
 
 
 
+----------------+--------------+------------+
| Travel History | Travel Start | Travel End |
+----------------+--------------+------------+
 
 
 
+-------------------------------------+
| No recent travel history available. |
+-------------------------------------+
 documented as of this encounter
 
 Progress Bobbi Petit RN - 2015  3:41 PM PDTPer DR. Moser, patient was ordered amoxici
llin 500 mg TID x 10 days. Patient called and notified.  Electronically signed by Bobbi mckeon RN at 2015  3:52 PM PDTdocumented in this encounter
 
 Plan of Treatment
 
 
+--------+-----------+------------+----------------------+-------------------+
| Date   | Type      | Specialty  | Care Team            | Description       |
+--------+-----------+------------+----------------------+-------------------+
| / | Office    | Cardiology |   HellalfredoTonia,    |                   |
|    | Visit     |            | ARNP  [address]   |                   |
|        |           |            | St  [place], [place]  |                   |
|        |           |            | [address]  [phone]  |                   |
|        |           |            |  [phone] (Fax)  |                   |
+--------+-----------+------------+----------------------+-------------------+
| / | Hospital  | Radiology  |   Popeye Townsend, |                   |
|    | Encounter |            |  MD  [address] [place] |                   |
|        |           |            |  St.  [place],   |                   |
|        |           |            | [place] [address]             |                   |
|        |           |            | [phone]         |                   |
|        |           |            | [phone] (Fax)   |                   |
+--------+-----------+------------+----------------------+-------------------+
| / | Surgery   | Radiology  |   Popeye Townsend, | CV EP PPM SYSTEM  |
|    |           |            |  MD  [address] | IMPLANT           |
|        |           |            |  St.  [place],   |                   |
|        |           |            | [place] [address]             |                   |
|        |           |            | [phone]         |                   |
|        |           |            | [phone] (Fax)   |                   |
+--------+-----------+------------+----------------------+-------------------+
 
| 02/10/ | Clinical  | Cardiology |                      |                   |
|    | Support   |            |                      |                   |
+--------+-----------+------------+----------------------+-------------------+
| / | Office    | Cardiology |   Tonia Wilson,    |                   |
|    | Visit     |            | ARNP  [address]   |                   |
|        |           |            | [place]  |                   |
|        |           |            | [address]  [phone]  |                   |
|        |           |            |  [phone] (Fax)  |                   |
+--------+-----------+------------+----------------------+-------------------+
| / | Off-Site  | Nephrology |   Marzena Moser  |                   |
|    | Visit     |            | DO ETIENNE  [address] [place] [address]      |                   |
|        |           |            | [address]      |                   |
|        |           |            | [place]      |                   |
|        |           |            | [address]  [phone]  |                   |
|        |           |            |  [phone] (Fax)  |                   |
+--------+-----------+------------+----------------------+-------------------+
 documented as of this encounter
 
 Visit Diagnoses
 
 
+-------------------------------------------------------------------------------------+
| Diagnosis                                                                           |
+-------------------------------------------------------------------------------------+
|   UTI (lower urinary tract infection) - Primary  Urinary tract infection, site not  |
| specified                                                                           |
+-------------------------------------------------------------------------------------+
 documented in this encounter

## 2020-01-08 NOTE — XMS
Encounter Summary
  Created on: 2020
 
 Viviana Ricci
 External Reference #: [id number]
 : 47
 Sex: Female
 
 Demographics
 
 
+-----------------------+----------------------+
| Address               | [address]      |
|                       | [place]  [address] |
+-----------------------+----------------------+
| Home Phone            | [phone]      |
+-----------------------+----------------------+
| Preferred Language    | Unknown              |
+-----------------------+----------------------+
| Marital Status        | Single               |
+-----------------------+----------------------+
| Religion Affiliation | [id number]                 |
+-----------------------+----------------------+
| Race                  | Unknown              |
+-----------------------+----------------------+
| Ethnic Group          | Unknown              |
+-----------------------+----------------------+
 
 
 Author
 
 
+--------------+--------------------------------------------+
| Author       | [facility]  |
|              | and [facility]                                |
+--------------+--------------------------------------------+
| Organization | [facility]  |
|              | and [facility]                                |
+--------------+--------------------------------------------+
| Address      | Unknown                                    |
+--------------+--------------------------------------------+
| Phone        | Unavailable                                |
+--------------+--------------------------------------------+
 
 
 
 Support
 
 
+----------------+--------------+---------------------+-----------------+
| Name           | Relationship | Address             | Phone           |
+----------------+--------------+---------------------+-----------------+
| Ada/Ed Rahdames | ECON         | [address]              | [phone] |
|                |              | [place]  |                 |
|                |              |  [address]              |                 |
+----------------+--------------+---------------------+-----------------+
 
 
 
 
 Care Team Providers
 
 
+-----------------------+------+-------------+
| Care Team Member Name | Role | Phone       |
+-----------------------+------+-------------+
 PCP  | Unavailable |
+-----------------------+------+-------------+
 
 
 
 Reason for Visit
 
 
+-------------------+----------+
| Reason            | Comments |
+-------------------+----------+
| Medication Refill |          |
+-------------------+----------+
 
 
 
 Encounter Details
 
 
+--------+--------+---------------------+----------------------+-------------------+
| Date   | Type   | Department          | Care Team            | Description       |
+--------+--------+---------------------+----------------------+-------------------+
| / | Refill |   [facility]         |   Marzena Moser  | Medication Refill |
|    |        | NEPHROLOGY  [address]   | M, DO  [address]      |                   |
|        |        | [address]   | [address]      |                   |
|        |        | [place], [place]     | [place], [place]      |                   |
|        |        | [address]          | [address]  [phone]  |                   |
|        |        | [phone]        |  [phone] (Fax)  |                   |
+--------+--------+---------------------+----------------------+-------------------+
 
 
 
 Social History
 
 
+--------------+-------+-----------+--------+------+
| Tobacco Use  | Types | Packs/Day | Years  | Date |
|              |       |           | Used   |      |
+--------------+-------+-----------+--------+------+
| Never Smoker |       |           |        |      |
+--------------+-------+-----------+--------+------+
 
 
 
+---------------------+---+---+---+
| Smokeless Tobacco:  |   |   |   |
| Never Used          |   |   |   |
+---------------------+---+---+---+
 
 
 
+---------------------------------------------------------------+
| Comments: some second hand smoke exposure, but fairly minimal |
 
+---------------------------------------------------------------+
 
 
 
+-------------+-------------+---------+----------+
| Alcohol Use | Drinks/Week | oz/Week | Comments |
+-------------+-------------+---------+----------+
| No          |             |         |          |
+-------------+-------------+---------+----------+
 
 
 
+------------------+---------------+
| Sex Assigned at  | Date Recorded |
| Birth            |               |
+------------------+---------------+
| Not on file      |               |
+------------------+---------------+
 
 
 
+----------------+-------------+-------------+
| Job Start Date | Occupation  | Industry    |
+----------------+-------------+-------------+
| Not on file    | Not on file | Not on file |
+----------------+-------------+-------------+
 
 
 
+----------------+--------------+------------+
| Travel History | Travel Start | Travel End |
+----------------+--------------+------------+
 
 
 
+-------------------------------------+
| No recent travel history available. |
+-------------------------------------+
 documented as of this encounter
 
 Plan of Treatment
 
 
+--------+-----------+------------+----------------------+-------------------+
| Date   | Type      | Specialty  | Care Team            | Description       |
+--------+-----------+------------+----------------------+-------------------+
| / | Office    | Cardiology |   HellalfredoTonia,    |                   |
|    | Visit     |            | ARNP  [address]   |                   |
|        |           |            | St  [place], [place]  |                   |
|        |           |            | [address]  [phone]  |                   |
|        |           |            |  [phone] (Fax)  |                   |
+--------+-----------+------------+----------------------+-------------------+
| / | Hospital  | Radiology  |   Popeye Townsend, |                   |
|    | Encounter |            |  MD  [address] |                   |
|        |           |            |  St.  [place],   |                   |
|        |           |            | [place] [address]             |                   |
|        |           |            | [phone]         |                   |
|        |           |            | [phone] (Fax)   |                   |
+--------+-----------+------------+----------------------+-------------------+
| / | Surgery   | Radiology  |   Popeye Townsend, | CV EP PPM SYSTEM  |
 
|    |           |            |  MD  [address] | IMPLANT           |
|        |           |            |  St.  [place],   |                   |
|        |           |            | [place] [address]             |                   |
|        |           |            | [phone]         |                   |
|        |           |            | [phone] (Fax)   |                   |
+--------+-----------+------------+----------------------+-------------------+
| 02/10/ | Clinical  | Cardiology |                      |                   |
|    | Support   |            |                      |                   |
+--------+-----------+------------+----------------------+-------------------+
| / | Office    | Cardiology |   Tonia Wilson,    |                   |
|    | Visit     |            | ARNP  [address]   |                   |
|        |           |            | [place]  |                   |
|        |           |            | [address]  [phone]  |                   |
|        |           |            |  [phone] (Fax)  |                   |
+--------+-----------+------------+----------------------+-------------------+
| / | Off-Site  | Nephrology |   Marzena Moser  |                   |
|    | Visit     |            | DO ETIENNE  [address] [place] [address]      |                   |
|        |           |            | [address]      |                   |
|        |           |            | [place]      |                   |
|        |           |            | [address]  [phone]  |                   |
|        |           |            |  [phone] (Fax)  |                   |
+--------+-----------+------------+----------------------+-------------------+
 documented as of this encounter
 
 Visit Diagnoses
 
 
+------------------------------------------------------------------------------------------+
| Diagnosis                                                                                |
+------------------------------------------------------------------------------------------+
|   Type II or unspecified type diabetes mellitus without mention of complication,         |
| uncontrolled - Primary                                                                   |
+------------------------------------------------------------------------------------------+
|   Insulin dependent type 2 diabetes mellitus, uncontrolled (HCC)  Type II or unspecified |
|  type diabetes mellitus without mention of complication, uncontrolled                    |
+------------------------------------------------------------------------------------------+
 documented in this encounter

## 2020-01-08 NOTE — XMS
Encounter Summary
  Created on: 2020
 
 Viviana Ricci
 External Reference #: [id number]
 : 47
 Sex: Female
 
 Demographics
 
 
+-----------------------+------------------------+
| Address               | [address]           |
|                       | [place]  [address]   |
+-----------------------+------------------------+
| Home Phone            | [phone]        |
+-----------------------+------------------------+
| Preferred Language    | Unknown                |
+-----------------------+------------------------+
| Marital Status        | Single                 |
+-----------------------+------------------------+
| Taoist Affiliation | CAT                    |
+-----------------------+------------------------+
| Race                  | White                  |
+-----------------------+------------------------+
| Ethnic Group          | Not  or  |
+-----------------------+------------------------+
 
 
 Author
 
 
+--------------+------------------------------+
| Author       | [facility] |
+--------------+------------------------------+
| Organization | [facility] |
+--------------+------------------------------+
| Address      | Unknown                      |
+--------------+------------------------------+
| Phone        | Unavailable                  |
+--------------+------------------------------+
 
 
 
 Support
 
 
+---------------+--------------+-----------------+-----------------+
| Name          | Relationship | Address         | Phone           |
+---------------+--------------+-----------------+-----------------+
| Ember Ricci | [address]         | [place]   | [phone] |
+---------------+--------------+-----------------+-----------------+
 
 
 
 Care Team Providers
 
 
 
+-----------------------+------+-------------+
| Care Team Member Name | Role | Phone       |
+-----------------------+------+-------------+
 PCP  | Unavailable |
+-----------------------+------+-------------+
 
 
 
 Encounter Details
 
 
+--------+----------+----------------------+--------------------+-------------+
| Date   | Type     | Department           | Care Team          | Description |
+--------+----------+----------------------+--------------------+-------------+
| 11/10/ | Results  |   [facility]  [address]  |   Other, Faculty   |             |
|    | Only     | [address]  | [phone]       |             |
|        |          |  [place], [place]        |                    |             |
|        |          | [address]           |                    |             |
|        |          | [phone]         |                    |             |
+--------+----------+----------------------+--------------------+-------------+
 
 
 
 Social History
 
 
+----------------+-------+-----------+--------+------+
| Tobacco Use    | Types | Packs/Day | Years  | Date |
|                |       |           | Used   |      |
+----------------+-------+-----------+--------+------+
| Never Assessed |       |           |        |      |
+----------------+-------+-----------+--------+------+
 
 
 
+------------------+---------------+
| Sex Assigned at  | Date Recorded |
| Birth            |               |
+------------------+---------------+
| Not on file      |               |
+------------------+---------------+
 
 
 
+----------------+-------------+-------------+
| Job Start Date | Occupation  | Industry    |
+----------------+-------------+-------------+
| Not on file    | Not on file | Not on file |
+----------------+-------------+-------------+
 
 
 
+----------------+--------------+------------+
| Travel History | Travel Start | Travel End |
+----------------+--------------+------------+
 
 
 
+-------------------------------------+
| No recent travel history available. |
 
+-------------------------------------+
 documented as of this encounter
 
 Plan of Treatment
 Not on filedocumented as of this encounter
 
 Procedures
 
 
+--------------------+--------+------------+----------------------+----------------------+
| Procedure Name     | Priori | Date/Time  | Associated Diagnosis | Comments             |
|                    | ty     |            |                      |                      |
+--------------------+--------+------------+----------------------+----------------------+
| SURGICAL PATHOLOGY | Routin | 11/10/1995 |                      |   Results for this   |
|                    | e      |            |                      | procedure are in the |
|                    |        |            |                      |  results section.    |
+--------------------+--------+------------+----------------------+----------------------+
 documented in this encounter
 
 Results
 SURGICAL PATHOLOGY (11/10/1995)
 
+-----------+--------------------------+-----------+-------------+--------------+
| Component | Value                    | Ref Range | Performed   | Pathologist  |
|           |                          |           | At          | Signature    |
+-----------+--------------------------+-----------+-------------+--------------+
| SURGICAL  | SOURCE OF SPECIMEN: [facility]  |           | [facility]        |              |
| PATHOLOGY | RESULTS                  |           | DEPARTMENT  |              |
|           | Preliminary              |           | OF          |              |
|           | History:CLINICAL         |           | PATHOLOGY   |              |
|           | HISTORYThe patient is a  |           |             |              |
|           | 49 year old diabetic     |           |             |              |
|           | woman who presents with  |           |             |              |
|           | nephroticsyndrome. Her   |           |             |              |
|           | active problems, besides |           |             |              |
|           |  proteinuria, include    |           |             |              |
|           | insulin-dependent        |           |             |              |
|           | diabetes, hypothyroidism |           |             |              |
|           |  and obesity. She had a  |           |             |              |
|           | kidney biopsyin October, |           |             |              |
|           |  , for evaluation of |           |             |              |
|           |  nephrotic syndrome,     |           |             |              |
|           | read as minimalchange    |           |             |              |
|           | disease. She has been    |           |             |              |
|           | treated with steroids    |           |             |              |
|           | and cyclophosphamideat   |           |             |              |
|           | various times with       |           |             |              |
|           | questionable response.   |           |             |              |
|           | Protein excretion        |           |             |              |
|           | hasincreased             |           |             |              |
|           | significantly over the   |           |             |              |
|           | last few years,          |           |             |              |
|           | currently in the rangeof |           |             |              |
|           |  15-20 gms. per day. Her |           |             |              |
|           |  creatinine clearance is |           |             |              |
|           |  55 ml. and her          |           |             |              |
|           | serumcreatinine is 1.4.  |           |             |              |
|           |       GROSS              |           |             |              |
|           | DESCRIPTIONThe specimen  |           |             |              |
|           | is received from .     |           |             |              |
 
|           | [facility]   |           |             |              |
|           | [facility]   |           |             |              |
|           | and [facility],      |           |             |              |
|           | [place], [place],        |           |             |              |
|           | labeled left             |           |             |              |
|           | kidneybiopsy. Present    |           |             |              |
|           | are three tan cores of   |           |             |              |
|           | tissue ranging from      |           |             |              |
|           | 0.3-1.5 cm.in length and |           |             |              |
|           |  0.1 cm. in uniform      |           |             |              |
|           | diameter. They are       |           |             |              |
|           | submitted in totofor     |           |             |              |
|           | light and                |           |             |              |
|           | immunofluorescence       |           |             |              |
|           | microscopy. The tissue   |           |             |              |
|           | submitted forelectron    |           |             |              |
|           | microscopy to the [facility]   |           |             |              |
|           | [facility]       |           |             |              |
|           | Electron                 |           |             |              |
|           | Microscopyfacility is    |           |             |              |
|           | considered to show       |           |             |              |
|           | sclerosis too advanced   |           |             |              |
|           | for                      |           |             |              |
|           | usefulevaluation.Case    |           |             |              |
|           | dictated by:   Karson MONTIEL |           |             |              |
|           |  JOVON Hale/tayo       |           |             |              |
|           |  MICROSCOPIC             |           |             |              |
|           | DESCRIPTIONThis tissue   |           |             |              |
|           | consists of multiple     |           |             |              |
|           | segments of a needle     |           |             |              |
|           | core of renalcortex      |           |             |              |
|           | containing about ten     |           |             |              |
|           | glomeruli per level.     |           |             |              |
|           | Among these, all butone  |           |             |              |
|           | or two show varying      |           |             |              |
|           | degrees of sclerosis.    |           |             |              |
|           | The least affected       |           |             |              |
|           | tuftdemonstrates minor   |           |             |              |
|           | axial sclerosis and the  |           |             |              |
|           | most affected is         |           |             |              |
|           | completelyobsolete. Most |           |             |              |
|           |  glomeruli are enlarged  |           |             |              |
|           | and contain axial, or in |           |             |              |
|           |  somecases, segmental,   |           |             |              |
|           | zones of sclerosis with  |           |             |              |
|           | broad tuft adhesions.    |           |             |              |
|           | Manycontain large        |           |             |              |
|           | hyaline deposits.        |           |             |              |
|           | Peripheral basement      |           |             |              |
|           | membranes arethickened.  |           |             |              |
|           | In the most sclerotic    |           |             |              |
|           | kareem there appear to be |           |             |              |
|           |  more than onelayer of   |           |             |              |
|           | basement membrane in     |           |             |              |
|           | some areas, particularly |           |             |              |
|           |  where themesangial      |           |             |              |
|           | sclerosis is also        |           |             |              |
|           | extensive. Tuft          |           |             |              |
|           | cellularity is normal in |           |             |              |
|           |  allinstances. The       |           |             |              |
 
|           | interstitium is fibrotic |           |             |              |
|           |  in most areas and there |           |             |              |
|           |  arebroad zones of       |           |             |              |
|           | partial or complete      |           |             |              |
|           | tubular collapse and     |           |             |              |
|           | atrophy. Smallnumbers of |           |             |              |
|           |  lymphocytes are admixed |           |             |              |
|           |  in these areas. Only    |           |             |              |
|           | rare zones ofnormal or,  |           |             |              |
|           | perhaps, hypertrophic    |           |             |              |
|           | tubules remain.          |           |             |              |
|           |       The biopsy         |           |             |              |
|           | contains a variety of    |           |             |              |
|           | arteries, the largest of |           |             |              |
|           |  which showsmoderate to  |           |             |              |
|           | severe fibroelastic type |           |             |              |
|           |  intimal sclerosis.      |           |             |              |
|           | Smaller arteriescontain  |           |             |              |
|           | hyaline deposits.        |           |             |              |
|           | The slides from the      |           |             |              |
|           | first kidney biopsy      |           |             |              |
|           | ([id number]) along with   |           |             |              |
|           | selectedelectron         |           |             |              |
|           | micrographs are reviewed |           |             |              |
|           |  and confirm that the    |           |             |              |
|           | principle findingat that |           |             |              |
|           |  time was extensive      |           |             |              |
|           | epithelial foot process  |           |             |              |
|           | collapse.                |           |             |              |
|           | IMMUNOFLUORESCENCEFrozen |           |             |              |
|           |  sections are stained    |           |             |              |
|           | with fluoresceinated     |           |             |              |
|           | antisera.                |           |             |              |
|           |   Positivecontrols are   |           |             |              |
|           | run simultaneously.      |           |             |              |
|           |   Each level contains    |           |             |              |
|           | three glomeruli.         |           |             |              |
|           | RESULTSIg+  |           |             |              |
|           | mesangial deposits at    |           |             |              |
|           | the vascular polesIgM:   |           |             |              |
|           |          2-3+ focal,     |           |             |              |
|           | segmental depositsIgA:   |           |             |              |
|           |          NegativeKAPPA:  |           |             |              |
|           |       Similar to         |           |             |              |
|           | IgMLAMBDA:      Similar  |           |             |              |
|           | to IgMC'3:               |           |             |              |
|           | 3-4+ lobular deposits    |           |             |              |
|           | with marked segmental    |           |             |              |
|           | variation; small         |           |             |              |
|           |             vessel       |           |             |              |
|           | deposits are also        |           |             |              |
|           | [id number]:          2+  |           |             |              |
|           | focal, segmental         |           |             |              |
|           | depositsALBUMIN:         |           |             |              |
|           | Negative       FINAL     |           |             |              |
|           | DIAGNOSISKIDNEY BIOPSY:  |           |             |              |
|           |   ADVANCED FOCAL,        |           |             |              |
|           | SEGMENTAL                |           |             |              |
|           | GLOMERULOSCLEROSIS       |           |             |              |
|           |   EXTENSIVE INTERSTITIAL |           |             |              |
 
|           |  FIBROSIS AND TUBULAR    |           |             |              |
|           | ATROPHY                  |           |             |              |
|           |   ARTERIOSCLEROSIS AND   |           |             |              |
|           | HYALINE ARTERIOLAR       |           |             |              |
|           | SCLEROSIS, MODERATE TO   |           |             |              |
|           | SEVERE       Case        |           |             |              |
|           | reviewed by:   Karson MONTIEL |           |             |              |
|           |  Geoffrey,               |           |             |              |
|           | M.D.T:95:dj        |           |             |              |
|           | My electronic signature  |           |             |              |
|           | indicates that I have    |           |             |              |
|           | personally reviewed      |           |             |              |
|           | alldiagnostic slides,    |           |             |              |
|           | the gross and/or         |           |             |              |
|           | microscopic portion of   |           |             |              |
|           | thisreport and           |           |             |              |
|           | formulated the final     |           |             |              |
|           | diagnosis.               |           |             |              |
+-----------+--------------------------+-----------+-------------+--------------+
 
 
 
+----------+
| Specimen |
+----------+
| Other    |
+----------+
 
 
 
+----------------------+------------------------+--------------------+--------------+
| Performing           | Address                | City/State/Zipcode | Phone Number |
| Organization         |                        |                    |              |
+----------------------+------------------------+--------------------+--------------+
|   [facility] |   [address]  | [place] [address] |              |
|  PATHOLOGY           | [address]                |                    |              |
+----------------------+------------------------+--------------------+--------------+
 documented in this encounter
 
 Visit Diagnoses
 Not on filedocumented in this encounter

## 2020-01-08 NOTE — XMS
Encounter Summary
  Created on: 2020
 
 Viviana Ricci
 External Reference #: [id number]
 : 47
 Sex: Female
 
 Demographics
 
 
+-----------------------+----------------------+
| Address               | [address]      |
|                       | [place]  [address] |
+-----------------------+----------------------+
| Home Phone            | [phone]      |
+-----------------------+----------------------+
| Preferred Language    | Unknown              |
+-----------------------+----------------------+
| Marital Status        | Single               |
+-----------------------+----------------------+
| Baptism Affiliation | [id number]                 |
+-----------------------+----------------------+
| Race                  | Unknown              |
+-----------------------+----------------------+
| Ethnic Group          | Unknown              |
+-----------------------+----------------------+
 
 
 Author
 
 
+--------------+--------------------------------------------+
| Author       | [facility]  |
|              | and [place]                                |
+--------------+--------------------------------------------+
| Organization | [facility]  |
|              | and [place]                                |
+--------------+--------------------------------------------+
| Address      | Unknown                                    |
+--------------+--------------------------------------------+
| Phone        | Unavailable                                |
+--------------+--------------------------------------------+
 
 
 
 Support
 
 
+----------------+--------------+---------------------+-----------------+
| Name           | Relationship | Address             | Phone           |
+----------------+--------------+---------------------+-----------------+
| Ada/Ed Radhames | ECON         | [address]              | [phone] |
|                |              | [place]  |                 |
|                |              |  [id number]              |                 |
+----------------+--------------+---------------------+-----------------+
 
 
 
 
 Care Team Providers
 
 
+------------------------+------+-----------------+
| Care Team Member Name  | Role | Phone           |
+------------------------+------+-----------------+
| Marzena Moser DO | PCP  | [phone] |
+------------------------+------+-----------------+
 
 
 
 Reason for Visit
 
 
+------------------+----------+
| Reason           | Comments |
+------------------+----------+
| Follow-up        |          |
+------------------+----------+
| Coronary Artery  |          |
| Disease          |          |
+------------------+----------+
| Hypertension     |          |
+------------------+----------+
| Hyperlipidemia   |          |
+------------------+----------+
 
 
 
 Encounter Details
 
 
+--------+---------+----------------------+----------------------+----------------------+
| Date   | Type    | Department           | Care Team            | Description          |
+--------+---------+----------------------+----------------------+----------------------+
| / | Office  |   [facility]          |   Tonia Wilson,    | Hypertension,        |
| 2018   | Visit   | CARDIOLOGY  [address]    | ARNP  [address]   | essential (Primary   |
|        |         | [address] | [address]  [place], [place]  | Dx); Coronary artery |
|        |         |  [place] [address]       | [address] [phone]  |  disease involving   |
|        |         | [phone]         |  [phone] (Fax)  | native coronary      |
|        |         |                      |                      | artery of native     |
|        |         |                      |                      | heart without angina |
|        |         |                      |                      |  pectoris; Mixed     |
|        |         |                      |                      | hyperlipidemia; PVC  |
|        |         |                      |                      | (premature           |
|        |         |                      |                      | ventricular          |
|        |         |                      |                      | contraction);        |
|        |         |                      |                      | Hypothyroidism,      |
|        |         |                      |                      | unspecified type;    |
|        |         |                      |                      | Vitamin D deficiency |
+--------+---------+----------------------+----------------------+----------------------+
 
 
 
 Social History
 
 
+--------------+-------+-----------+--------+------+
| Tobacco Use  | Types | Packs/Day | Years  | Date |
 
|              |       |           | Used   |      |
+--------------+-------+-----------+--------+------+
| Never Smoker |       |           |        |      |
+--------------+-------+-----------+--------+------+
 
 
 
+---------------------+---+---+---+
| Smokeless Tobacco:  |   |   |   |
| Never Used          |   |   |   |
+---------------------+---+---+---+
 
 
 
+---------------------------------------------------------------+
| Comments: some second hand smoke exposure, but fairly minimal |
+---------------------------------------------------------------+
 
 
 
+-------------+-------------+---------+----------+
| Alcohol Use | Drinks/Week | oz/Week | Comments |
+-------------+-------------+---------+----------+
| No          |             |         |          |
+-------------+-------------+---------+----------+
 
 
 
+------------------+---------------+
| Sex Assigned at  | Date Recorded |
| Birth            |               |
+------------------+---------------+
| Not on file      |               |
+------------------+---------------+
 
 
 
+----------------+-------------+-------------+
| Job Start Date | Occupation  | Industry    |
+----------------+-------------+-------------+
| Not on file    | Not on file | Not on file |
+----------------+-------------+-------------+
 
 
 
+----------------+--------------+------------+
| Travel History | Travel Start | Travel End |
+----------------+--------------+------------+
 
 
 
+-------------------------------------+
| No recent travel history available. |
+-------------------------------------+
 documented as of this encounter
 
 Last Filed Vital Signs
 
 
+-------------------+----------------------+----------------------+-----------+
 
| Vital Sign        | Reading              | Time Taken           | Comments  |
+-------------------+----------------------+----------------------+-----------+
| Blood Pressure    | 104/72               | 2018  2:31 PM  |           |
|                   |                      | PDT                  |           |
+-------------------+----------------------+----------------------+-----------+
| Pulse             | 58                   | 2018  2:31 PM  | irregular |
|                   |                      | PDT                  |           |
+-------------------+----------------------+----------------------+-----------+
| Temperature       | -                    | -                    |           |
+-------------------+----------------------+----------------------+-----------+
| Respiratory Rate  | 12                   | 2018  2:31 PM  |           |
|                   |                      | PDT                  |           |
+-------------------+----------------------+----------------------+-----------+
| Oxygen Saturation | -                    | -                    |           |
+-------------------+----------------------+----------------------+-----------+
| Inhaled Oxygen    | -                    | -                    |           |
| Concentration     |                      |                      |           |
+-------------------+----------------------+----------------------+-----------+
| Weight            | 121.2 kg (267 lb 3.2 | 2018  2:31 PM  |           |
|                   |  oz)                 | PDT                  |           |
+-------------------+----------------------+----------------------+-----------+
| Height            | 167.6 cm (5' 6")     | 2018  2:31 PM  |           |
|                   |                      | PDT                  |           |
+-------------------+----------------------+----------------------+-----------+
| Body Mass Index   | 43.13                | 2018  2:31 PM  |           |
|                   |                      | PDT                  |           |
+-------------------+----------------------+----------------------+-----------+
 documented in this encounter
 
 Progress Notes
 Tonia Wilson ARNP - 2018  3:00 PM PDTFormatting of this note might be different fr
om the original.
    
 
 PATIENT NAME:  Viviana Ricci  
 : 1945:         AGE: 72 y.o.
  PRIMARY CARE:
 Marzena Moser DO
 CC:   
 
 OUTPATIENT FOLLOW UP VISIT
 
 Date of Service: 2018 
 
 HISTORY OF PRESENT ILLNESS:
 Viviana Ricci is a 72 y.o. female with a history of Coronary artery disease post CABG x 3 
in , history of diabetes, renal failure post a renal transplantation, morbid obesity, in
activity, hypertension, hyperlipidemia, pulmonary hypertension and severe arthritis.  She is
 being seen today for follow up coronary artery disease.
 
 She was last seen 17 at which time she had no changes in her medical regimen.  Since 
that time, she reports she has been about the same. She only had one day where the smoke in 
the air seemed to bother her breathing, and otherwise has been fine. She has had a poor ener
gy level chronically. She will sometimes take a nap. She has not been very active. She does 
her cooking, laundry, changes her sheets, take out the garbage, but her deep cleaning is don
e by a . She enjoys playing with her dog, or watching TV in her spare time.  She 
has not had any chest pain or discomfort at rest or with exertion. She has had shortness of 
breath with just general daily living at times. Some says she doesn't have any symptoms at a
ll, and other days she can have symptoms with walking to the bathroom.  She has not had any 
lightheadedness or dizziness.  She has not noticed palpitations.  She has had leg swelling i
 
n mostly her left leg, however she does have swelling in both legs at end of the day, Lasix 
40 mg daily.  She notes that if she is careful about not eating salt that her leg swelling i
s better.  She was using her furosemide more as needed until she saw Dr. Avina, on 8/15/1
8 pulmonologist, who counseled her to take it daily as she was having 3+ pitting edema on he
r left leg and 2+ pitting edema on her right.  She sleeps with CPAP machine in place nightly
 with oxygen bled in at 2.5 liters per minute. She sleeps on a flat pillow at night.
 
 MEDICAL, SURGICAL, AND PERSONAL HISTORY
 Past Medical, Surgical, Family, and Social History are reviewed in EPIC.
 
 CURRENT PROBLEMS
 Patient Active Problem List 
 Diagnosis 
   Chronic low back pain 
   Hypothyroidism 
   Mixed hyperlipidemia 
   Complications of transplanted kidney 
   Movement disorder 
   Diabetes mellitus type II, uncontrolled  
   Pulmonary hypertension  
   Coronary artery disease involving native coronary artery of native heart without angina
 pectoris 
   JACK on CPAP 
   Obesity hypoventilation syndrome  
   Kidney replaced by transplant 
   Secondary hyperparathyroidism  
   Dyspnea 
   FSGS (focal segmental glomerulosclerosis) 
   Murmur 
   Cardiomegaly 
   Hypertension, essential 
   PLMD (periodic limb movement disorder) 
   Chest pain 
   UTI (lower urinary tract infection) 
   Renal transplant recipient 
   Thyroid activity decreased 
   Type 2 DM with CKD stage 2 and hypertension  
 
 CURRENT MEDICATIONS
 Current Outpatient Prescriptions 
 Medication Sig Dispense Refill 
   allopurinol (ZYLOPRIM) 100 mg tablet take 1 tablet by mouth once daily 30 tablet 11 
   aspirin 81 mg EC tablet Take 81 mg by mouth Daily.   
   B-D INS SYRINGE 0.5CC/31GX5/16 31G X 5/16" 0.5 ML MISC use as directed BEFORE MEALS 100
 each 11 
   cholecalciferol (VITAMIN D-3) 2000 UNITS TABS Take 5,000 Units by mouth Every other day
.   
   fludrocortisone (FLORINEF) 0.1 mg tablet take 1 tablet by mouth every other day 90 tabl
et 4 
   furosemide (LASIX) 40 mg tablet Take 1 tablet by mouth Daily as needed. 30 tablet 5 
   glucose blood VI test strips (ONE TOUCH ULTRA TEST) strip Check blood sugar before each
 meal and as directed 100 each 12 
   insulin lispro (HUMALOG) 100 units/mL injection (vial) inject subcutaneously BEFORE CHIKA
LS ACCORDING TO SLIDING SCALE Max dose 20 units daily 10 vial 11 
   LANTUS 100 UNIT/ML injection (vial) inject 20 units subcutaneously every morning 10 via
l 11 
   levothyroxine (SYNTHROID) 50 mcg tablet take 1 tablet by mouth once daily 30 tablet 11 
   lisinopril (PRINIVIL,ZESTRIL) 30 MG tablet take 1 tablet by mouth once daily 90 tablet 
3 
   loperamide (ANTI-DIARRHEAL) 2 mg capsule Take 1 capsule by mouth 4 times daily as neede
 
d. 60 capsule 5 
   losartan (COZAAR) 50 mg tablet take 1 tablet by mouth once daily 90 tablet 3 
   magnesium oxide (MAG-OX) 400 mg tablet take 1 tablet by mouth twice a day 60 tablet 11 
   metoprolol tartrate (LOPRESSOR) 25 mg tablet take 1 tablet by mouth twice a day 60 tabl
et 11 
   mycophenolate (CELLCEPT) 250 mg capsule Take 250 mg by mouth 2 times daily.   
   omeprazole (PRILOSEC) 20 mg capsule Take 20 mg by mouth every morning (before breakfast
).   
   predniSONE (DELTASONE) 5 mg tablet Take 5 mg by mouth Daily.   
   Prenatal Vit-Fe Fumarate-FA (PNV PRENATAL PLUS MULTIVITAMIN) 27-1 MG TABS take 1 tablet
 by mouth once daily. 30 tablet 11 
   QVAR REDIHALER 80 MCG/ACT inhaler    
   Respiratory Therapy Supplies MISC Continue nocturnal O2 at 2 l/m through CPAP for lifet
bart. Dx: JACK G47.33 Please provide new nasal mask for CPAP and any other needed replacement 
supplies. 1 each 0 
   Respiratory Therapy Supplies MISC Decrease CPAP to 12-18 cmH2O Diagnosis Code(s)327.23.
 Please send order to [facility]. 1 each 0 
   rosuvastatin (CRESTOR) 20 mg tablet take 1 tablet by mouth NIGHTLY 30 tablet 11 
   SENSIPAR 30 MG tablet take 1 tablet by mouth once daily 30 tablet 11 
   tacrolimus (PROGRAF) 0.5 mg capsule Take 1 capsule by mouth every morning. For a total 
of 1.5 mg, A.M., and 1 mg, PM. 30 capsule 11 
   tacrolimus (PROGRAF) 1 mg capsule Take 1 capsule by mouth 2 times daily. For a total of
 1.5 mg, A.M., and 1 mg, PM. 60 capsule 11 
 
 No current facility-administered medications for this visit.  
  
 
 ALLERGIES
 No Known Allergies
 
 ROS
 Review of Systems 
 Constitutional: Positive for malaise/fatigue. Negative for chills, diaphoresis, fever and w
eight loss. 
 HENT: Negative for hearing loss, nosebleeds and tinnitus.  
 Eyes: Negative for blurred vision and double vision. 
 Respiratory: Positive for shortness of breath. Negative for cough.  
 Cardiovascular: Positive for orthopnea (CPAP with oxygen at 2.5 liters) and leg swelling. N
egative for chest pain, palpitations, claudication and PND. 
 Gastrointestinal: Negative for abdominal pain, blood in stool, constipation, diarrhea, hear
tburn, melena, nausea and vomiting. 
 Genitourinary: Positive for frequency. Negative for hematuria and urgency. 
 Musculoskeletal: Positive for back pain and neck pain. Negative for falls, joint pain and m
yalgias. 
 Skin: Negative for itching and rash. 
 Neurological: Positive for tremors. Negative for dizziness, tingling, seizures, loss of con
sciousness, weakness and headaches. 
      Positive for gait problems, uses a walker for streadyness
 Negative for dental problems
 Negative for lightheadedness 
 Endo/Heme/Allergies: Negative for environmental allergies. Bruises/bleeds easily. 
 Psychiatric/Behavioral: Negative for memory loss. The patient is not nervous/anxious and do
es not have insomnia.  
  
 
 OBJECTIVE:  
 
 PHYSICAL EXAM
 /72  | Pulse 58 Comment: irregular | Resp 12  | Ht 1.676 m (5' 6")  | Wt 121.2 kg (26
7 lb 3.2 oz)  | BMI 43.13 kg/m 
 
 Physical Exam 
 Constitutional: She is oriented to person, place, and time. She appears well-developed and 
well-nourished. 
 Elderly female in no acute distress, arrived alone 
 Neck: Normal carotid pulses and no JVD present. Carotid bruit is not present. 
 Cardiovascular: Normal rate, regular rhythm, S1 normal, S2 normal and intact distal pulses.
  PMI is not displaced.  Exam reveals distant heart sounds (due to girth). Exam reveals no g
allop and no friction rub.  
 Murmur heard.
  Holosystolic murmur is present with a grade of 1/6 
 Pulses:
      Carotid pulses are 2+ on the right side, and 2+ on the left side.
      Posterior tibial pulses are 1+ on the right side, and 1+ on the left side. 
 Pulmonary/Chest: Effort normal and breath sounds normal. No accessory muscle usage. No resp
iratory distress. She has no wheezes. She has no rhonchi. She has no rales. 
 Abdominal: Soft. Normal appearance and normal aorta. She exhibits no abdominal bruit. There
 is no hepatosplenomegaly (difficult to fully evaluate due to body habitus). There is no ten
derness. 
 Musculoskeletal: She exhibits edema (trace at right ankle, 1+ on left ankle). 
 Neurological: She is alert and oriented to person, place, and time. Gait (using front wheel
ed walker) abnormal. 
 Skin: Skin is warm and dry. No cyanosis. Nails show no clubbing. 
 Psychiatric: She has a normal mood and affect. Her mood appears not anxious. She does not e
xhibit a depressed mood. 
 
 ECG: I personally independently reviewed ECG tracing during this visit (interpreted and cherise
led by another provider):
 Results for orders placed or performed in visit on 17 
 ECG 12 lead 
 Result Value Ref Range 
  INTERPRETATION TEXT   
   Sinus rhythm with 1st degree AV block
 Left axis deviation
 Abnormal ECG
 When compared with ECG of 08-DEC-2015 15:26,
 No significant change was found
 Confirmed by AEBBE TOWNSEND MD ([id number]) on 2017 4:11:18 PM
  
   Which is compared to today's ECG 2018: Sinus rhythm with PACs and PVC with rate of 7
0 beats per minute 
 
 LAB RESULTS reviewed during visit today primarily from [facility]: 
 LIPID
 Lab Results 
 Component Value Date 
  CHOL 162 2013 
  TRIG 225 2013 
  HDL 45 2013 
  LDL 72 2013 
  LDLEX 45 2018 
  HDLEX 53.7 2018 
  TRIGEX 163 (A) 2018 
  CHOLEX 131 2018 
 
 CHEMISTRY
 Lab Results 
 Component Value Date 
   2014 
  GLUEX 87 2018 
 
   2014 
  NAEX 139 2018 
  K 5.4 2014 
  KEX 4.5 2018 
   2014 
  CLEX 104 2018 
  CO2 22 2014 
  CO2EX 17 (A) 2018 
  CALCIUM 10.4 2014 
  ALKPHOS 100 2014 
  AST 20 07/10/2013 
  ASTEX 24 2018 
  ALT 14 07/10/2013 
  ALTEX 16 2018 
  BILITOT 0.6 2014 
  CREA 1.7 07/10/2013 
  BUN 40 2014 
  EGFR 31.0 07/10/2013 
  EGFREX 33 2018 
  CREEX 1.54 (A) 2018 
 
 HEMATOLOGY
 Lab Results 
 Component Value Date 
  WBC 4.6 07/10/2013 
  WBCEX 7.1 2018 
  HGB 11.9 07/10/2013 
  HGBEX 14.7 2018 
  HCT 35.7 07/10/2013 
  HCTEX 45.2 (A) 2018 
   (A) 07/10/2013 
  PLTEX 135 (A) 2018 
  
 Lab Results 
 Component Value Date 
  TSH 0.69 07/10/2013 
  TSHEX 1.64 2016 
   (H) 2013 
   (H) 2013 
 
 I reviewed records from alex Avina MD for office visit on 8/15/18 which is sum
marized in the HPI. 
 
 IMAGING- I reviewed reports from [facility]:
 Result Date: 8/15/2018
 CLINICAL INFORMATION: under-treated partially compensated CHF. COMPARISON: 3 2016. FINDINGS
: Frontal and lateral views of the chest. Median sternotomy changes. Lungs: No focal airspac
e disease, pleural effusion, or pneumothorax.  Minimal bilateral basilar dependent atelectas
is versus scarring. Heart/mediastinum: Borderline cardiomegaly. No mediastinal widening. Bon
es: No acute osseous abnormality appreciated. IMPRESSION - Borderline cardiomegaly.  No acut
e pulmonary disease. Dictated and Signed by: Jorge Thomas MD  Electronically signed: 8/15
/2018 2:16 PM
   
 Echocardiogram 18 showing, moderate left atrial dilatation. Normal left ventricular s
ize with a mild concentric left ventricular hypertrophy.  Left ventricular systolic dysfunct
ion is preserved.  LVEF is 60-65%. Mild aortic root dilatation measuring 3.9 cm in diameter,
 mildly thickened and calcified trileaflet aortic valve with adequate opening. There is aort
ic valve sclerosis without significant aortic valve stenosis. Mildly thickened and calcified
 mitral valve with a mild mitral valve regurgitation, mild mitral annular calcification. Nor
mal right-sided pressure. Normal IVC with normal respiratory collapse. When compared to echo
 
cardiography on 4/15/14, no significant changes.
 
 Above data and testing is reviewed this visit; testing below is historical data unless othe
rwise specified.
 ASSESSMENT:  
 
 1. Coronary artery disease:
 A.  Status post CABG x 3 in  with LIMA to the LAD, SVG to the OM1 and OM2.
  B.  A persantine sestamibi stress test on 07 revealed a medium
 sized, moderate in severity fixed defect of the anterior wall, and a small sized mild in se
verity fixed defect of the inferior wall, LVEF by gated SPECT was 66%.
             C.  Bilateral heart catheterization on 05/03/10, shows severe two vessel corona
ry artery disease, a chronic occlusion through the proximal portion of the left anterior jed
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
rtery, grafts: open left internal mammary artery graft to the mid left anterior descending a
rtery, open saphenous vein grafts to the OM1 and OM2, mildly dilated left ventricular cavity
 with a normal left systolic function, LVEF 70%, mild to moderate pulmonary hypertension and
 a normal cardiac output, coronary circulation is right dominant, normal system pressure.
             D.  Persantine nuclear medicine stress test 14 shows a normal myocardial p
erfusion imaging study with normal left ventricular size, wall thickness, LVEF by gated SPEC
T of 78%.  
             E.  Today, 2018, she denies chest pain. She remains on aspirin, high inten
sity statin, beta-blocker, ACE-I and ARB (as directed by nephrologist).
 
 2.  Right sided heart failure/ cor pulmonale / severe pulmonary hypertension:
  A. The echocardiogram from 02/19/10 revealed moderate bi-atrial dila
tation and normal left ventricular size, wall thickness and motion, LVEF is 55-60%, dilated 
right ventricle with moderate hypo-kinesis, moderate tricuspid valve regurgitation, severe p
ulmonary hypertension with a peak systolic pressure of 80 mmHg, and a small pericardial effu
jaspreet. 
             B. Echocardiogram from 4/15/14 showed Mild left atrial dilatation. Normal left 
ventricular size, wall thickness and motion. Preserved  left ventricular systolic function. 
LVEF is 70-75%. Grade 1 left ventricular diastole dysfunction. Mild tricuspid valve regurgit
ation. Mild thickened trileaflet aortic valve with adequate opening. A mild aortic valve ins
ufficiency. Normal right-sided pressure.
  C. Echocardiogram 18 showing, moderate left atrial dilatation. Normal left ventricul
ar size with a mild concentric left ventricular hypertrophy.  Left ventricular systolic dysf
unction is preserved.  LVEF is 60-65%. Mild aortic root dilatation measuring 3.9 cm in diame
ter, mildly thickened and calcified trileaflet aortic valve with adequate opening. There is 
aortic valve sclerosis without significant aortic valve stenosis. Mildly thickened and calci
fied mitral valve with a mild mitral valve regurgitation, mild mitral annular calcification.
 Normal right-sided pressure. Normal IVC with normal respiratory collapse. When compared to 
echocardiography on 4/15/14, no significant changes.
  D. Today, 2018, her echocardiogram results are reviewed with her.  No pulmonary hyper
tension noted, no right-sided heart failure on echocardiogram.  She is in class IIb [place]
 Heart Association functional class and appears well compensated on daily diuretic. 
 
 3. Hypertension, essential:
             A.  Today, 2018, her blood pressure is well-controlled. 
 
 4.  Hyperlipidemia, mixed.
             A. Today, 2018, she remains on high intensity statin with rosuvastatin 20 
mg. 
 
 5.  Obstructive sleep apnea:
             A.  She uses a CPAP machine at night with 2.5 L of oxygen bled in.
 
 6.  Morbid obesity.
             A. Today, 2018, Body mass index is 43.13 kg/m.  Essentially unchanged.
 
 
 7. Type II diabetes. Not otherwise addressed today 2018. 
 
 8. Chronic kidney disease: Not otherwise addressed today 2018. 
             A.  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 Followed by Dr. Moser.
 
 9.  DVT left leg 2015: Not otherwise addressed today 2018. 
             A.  She took a one year course of apixaban, now on aspirin alone.
 
 10.  Hypothyroidism. She hasn't had lab for this for a couple years. Not otherwise addresse
d today, 2018.
 
 PLAN:  
 
 1. She will have labs done for BMP, magnesium, TSH, free T4, and vitamin D.  She will be se
nt a letter or called with the results. 
 2. She will otherwise continue with her current medical regimen.
 3. She will follow up in 1 year for office visit, or sooner with concerns.  She will have a
n ECG at her follow up visit and She will have fasting labs prior to visit for lipid profile
, CMP and CBC, if not done prior by another provider.
 
 Electronically signed by: 
 BELKIS Arredondo on 2018 
 
 I, LALA Devi, am acting as a scribe on behalf of, and in the presence of BELKIS Arredondo. - LALA Devi 2018 14:39  
 I, BELKIS Arredondo, personally performed the services described in this documentation, 
as scribed in my presence and it is both accurate and complete. BELKIS Arredondo 
8  
 Portions of this chart may have been created with Dragon voice recognition software. Occasi
onal wrong-word or   sound-alike  substitutions may have occurred due to the inherent naqvi
itations of voice recognition software. Please read the chart carefully and recognize, using
 context, where these substitutions have occurred.   Electronically signed by BELKIS Arredondo at 2018  3:51 PM PDTdocumented in this encounter
 
 Plan of Treatment
 
 
+--------+-----------+------------+----------------------+-------------------+
| Date   | Type      | Specialty  | Care Team            | Description       |
+--------+-----------+------------+----------------------+-------------------+
| / | Office    | Cardiology |   Tonia Wilson,    |                   |
|    | Visit     |            | ARNP  [address]   |                   |
|        |           |            | [place], [place]  |                   |
|        |           |            | [id number] [phone]  |                   |
|        |           |            |  [phone] (Fax)  |                   |
+--------+-----------+------------+----------------------+-------------------+
| / | Hospital  | Radiology  |   Abebe Townsend, |                   |
|    | Encounter |            |  MD  [address] |                   |
|        |           |            |  St.  [place],   |                   |
|        |           |            | [place] [address]             |                   |
|        |           |            | [phone]         |                   |
|        |           |            | [phone] (Fax)   |                   |
+--------+-----------+------------+----------------------+-------------------+
| / | Surgery   | Radiology  |   Abebe Townsend, | CV EP PPM SYSTEM  |
|    |           |            |  MD  [address] | IMPLANT           |
|        |           |            |  St.  [place],   |                   |
|        |           |            | [place] [address]             |                   |
|        |           |            | [phone]         |                   |
|        |           |            | [phone] (Fax)   |                   |
+--------+-----------+------------+----------------------+-------------------+
 
| 02/10/ | Clinical  | Cardiology |                      |                   |
|    | Support   |            |                      |                   |
+--------+-----------+------------+----------------------+-------------------+
| / | Office    | Cardiology |   Tonia Wilson,    |                   |
|    | Visit     |            | ARNP  [address]   |                   |
|        |           |            | St  [place], [place]  |                   |
|        |           |            | [address]  [phone]  |                   |
|        |           |            |  [phone] (Fax)  |                   |
+--------+-----------+------------+----------------------+-------------------+
| / | Off-Site  | Nephrology |   Marzena Moser  |                   |
|    | Visit     |            | [place],   [address]      |                   |
|        |           |            | [address]      |                   |
|        |           |            | [place]      |                   |
|        |           |            | [address]  [phone]  |                   |
|        |           |            |  [phone] (Fax)  |                   |
+--------+-----------+------------+----------------------+-------------------+
 documented as of this encounter
 
 Procedures
 
 
+----------------+--------+-------------+----------------------+----------------------+
| Procedure Name | Priori | Date/Time   | Associated Diagnosis | Comments             |
|                | ty     |             |                      |                      |
+----------------+--------+-------------+----------------------+----------------------+
| ECG 12 LEAD    | Routin | 2018  |   Coronary artery    |   Results for this   |
|                | e      |  3:01 PM    | disease involving    | procedure are in the |
|                |        | PDT         | native coronary      |  results section.    |
|                |        |             | artery of native     |                      |
|                |        |             | heart without angina |                      |
|                |        |             |  pectoris            |                      |
+----------------+--------+-------------+----------------------+----------------------+
 documented in this encounter
 
 Results
 T4, Free (2018  4:00 PM PDT)
 
+-----------+-------+-----------------+-------------+--------------+
| Component | Value | Ref Range       | Performed   | Pathologist  |
|           |       |                 | At          | Signature    |
+-----------+-------+-----------------+-------------+--------------+
| FT4       | 0.9   | 0.6 - 1.1 ng/dL | [facility]  |              |
|           |       |                 | [facility]    |              |
|           |       |                 | [facility]     |              |
|           |       |                 | [facility] -    |              |
|           |       |                 | LABORATORY  |              |
+-----------+-------+-----------------+-------------+--------------+
 
 
 
+----------+
| Specimen |
+----------+
| Blood    |
+----------+
 
 
 
+----------------------+--------------------+--------------------+----------------+
| Performing           | Address            | City/State/Zipcode | Phone Number   |
 
| Organization         |                    |                    |                |
+----------------------+--------------------+--------------------+----------------+
|   [facility]     |   [address] | [place], [place]    |   [phone] |
| [facility]  |                    | [address]              |                |
| - LABORATORY         |                    |                    |                |
+----------------------+--------------------+--------------------+----------------+
 Vitamin D, Deficiency Screen (25-Hydroxy) (2018  4:00 PM PDT)
 
+-------------+-------+---------------+-------------+--------------+
| Component   | Value | Ref Range     | Performed   | Pathologist  |
|             |       |               | At          | Signature    |
+-------------+-------+---------------+-------------+--------------+
| Vitamin D,  | 37    | 30 - 80 ng/mL | [facility]  |              |
| 25 Hydroxy  |       |               | [facility]    |              |
|             |       |               | [facility]     |              |
|             |       |               | CENTER -    |              |
|             |       |               | LABORATORY  |              |
+-------------+-------+---------------+-------------+--------------+
 
 
 
+----------+
| Specimen |
+----------+
| Blood    |
+----------+
 
 
 
+----------------------+--------------------+--------------------+----------------+
| Performing           | Address            | [facility] | Phone Number   |
| Organization         |                    |                    |                |
+----------------------+--------------------+--------------------+----------------+
|   [facility]     |   [address] | [place]    |   [phone] |
| [facility]  |                    | [address]              |                |
| - LABORATORY         |                    |                    |                |
+----------------------+--------------------+--------------------+----------------+
 TSH (2018  4:00 PM PDT)
 
+-----------+-------------------------+--------------+-------------+--------------+
| Component | Value                   | Ref Range    | Performed   | Pathologist  |
|           |                         |              | At          | Signature    |
+-----------+-------------------------+--------------+-------------+--------------+
| TSH       | 1.26Comment: This is a  | 0.45 - 5.33  | [facility]  |              |
|           | third generation TSH    | uIU/mL       | [facility]    |              |
|           | test.                   |              | MEDICAL     |              |
|           |                         |              | CENTER -    |              |
|           |                         |              | LABORATORY  |              |
+-----------+-------------------------+--------------+-------------+--------------+
 
 
 
+----------+
| Specimen |
+----------+
| Blood    |
+----------+
 
 
 
 
+----------------------+--------------------+--------------------+----------------+
| Performing           | Address            | City/State/Zipcode | Phone Number   |
| Organization         |                    |                    |                |
+----------------------+--------------------+--------------------+----------------+
|   [facility]     |   [address] | [place]    |   [phone] |
| [facility]  |                    | [address]              |                |
| - LABORATORY         |                    |                    |                |
+----------------------+--------------------+--------------------+----------------+
 Magnesium (2018  4:00 PM PDT)
 
+-----------+---------+-----------------+-------------+--------------+
| Component | Value   | Ref Range       | Performed   | Pathologist  |
|           |         |                 | At          | Signature    |
+-----------+---------+-----------------+-------------+--------------+
| Magnesium | 1.7 (L) | 1.8 - 2.5 mg/dL | [facility]  |              |
|           |         |                 | [facility]    |              |
|           |         |                 | MEDICAL     |              |
|           |         |                 | CENTER -    |              |
|           |         |                 | LABORATORY  |              |
+-----------+---------+-----------------+-------------+--------------+
 
 
 
+----------+
| Specimen |
+----------+
| Blood    |
+----------+
 
 
 
+----------------------+--------------------+--------------------+----------------+
| Performing           | Address            | City/State/Zipcode | Phone Number   |
| Organization         |                    |                    |                |
+----------------------+--------------------+--------------------+----------------+
|   [facility]     |   [address] | [place]    |   [phone] |
| [facility]  |                    | [address]              |                |
| - LABORATORY         |                    |                    |                |
+----------------------+--------------------+--------------------+----------------+
 Basic Metabolic Panel (2018  4:00 PM PDT)
 
+-------------+--------------------------+-----------------+-------------+--------------+
| Component   | Value                    | Ref Range       | Performed   | Pathologist  |
|             |                          |                 | At          | Signature    |
+-------------+--------------------------+-----------------+-------------+--------------+
| Na          | 140                      | 136 - 149       | [facility]  |              |
|             |                          | mmol/L          | [facility]    |              |
|             |                          |                 | MEDICAL     |              |
|             |                          |                 | CENTER -    |              |
|             |                          |                 | LABORATORY  |              |
+-------------+--------------------------+-----------------+-------------+--------------+
| K           | 5.3 (H)                  | 3.5 - 5.1       | [facility]  |              |
|             |                          | mmol/L          | [facility]    |              |
|             |                          |                 | MEDICAL     |              |
|             |                          |                 | CENTER -    |              |
|             |                          |                 | LABORATORY  |              |
+-------------+--------------------------+-----------------+-------------+--------------+
| Cl          | 110 (H)                  | 98 - 109 mmol/L | [facility]  |              |
|             |                          |                 | [facility]    |              |
|             |                          |                 | MEDICAL     |              |
 
|             |                          |                 | CENTER -    |              |
|             |                          |                 | LABORATORY  |              |
+-------------+--------------------------+-----------------+-------------+--------------+
| CO2         | 21 (L)                   | 24 - 31 mmol/L  | [facility]  |              |
|             |                          |                 | [facility]    |              |
|             |                          |                 | MEDICAL     |              |
|             |                          |                 | CENTER -    |              |
|             |                          |                 | LABORATORY  |              |
+-------------+--------------------------+-----------------+-------------+--------------+
| Anion Gap   | 9                        | 3 - 16 mmol/L   | [facility]  |              |
|             |                          |                 | [facility]    |              |
|             |                          |                 | MEDICAL     |              |
|             |                          |                 | CENTER -    |              |
|             |                          |                 | LABORATORY  |              |
+-------------+--------------------------+-----------------+-------------+--------------+
| Glucose     | 161 (H)                  | 70 - 109 mg/dL  | [facility]  |              |
|             |                          |                 | [facility]    |              |
|             |                          |                 | [facility]     |              |
|             |                          |                 | CENTER -    |              |
|             |                          |                 | LABORATORY  |              |
+-------------+--------------------------+-----------------+-------------+--------------+
| BUN         | 43 (H)                   | 7 - 18 mg/dL    | [facility]  |              |
|             |                          |                 | [facility]    |              |
|             |                          |                 | [facility]     |              |
|             |                          |                 | CENTER -    |              |
|             |                          |                 | LABORATORY  |              |
+-------------+--------------------------+-----------------+-------------+--------------+
| Creatinine  | 1.48 (H)                 | 0.60 - 1.30     | [facility]  |              |
|             |                          | mg/dL           |  [facility]    |              |
|             |                          |                 | [facility]     |              |
|             |                          |                 | CENTER -    |              |
|             |                          |                 | LABORATORY  |              |
+-------------+--------------------------+-----------------+-------------+--------------+
| eGFR if not | 35 (L)Comment:           | >=60            | [facility]  |              |
|      | GLOMERULAR FILTRATION    | mL/min/1.73m2   | [facility]    |              |
| [employer]    | RATE,ESTIMATED           |                 | MEDICAL     |              |
|             |   mL/min/1.42h9Gqpn than |                 | CENTER -    |              |
|             |  60    Chronic kidney    |                 | LABORATORY  |              |
|             | disease,if found over a  |                 |             |              |
|             | 3-month period.Less than |                 |             |              |
|             |  15    Kidney failureFor |                 |             |              |
|             |                   |                 |             |              |
|             | Americans,multiply the   |                 |             |              |
|             | calculated GFR by 1.21.  |                 |             |              |
|             |                          |                 |             |              |
+-------------+--------------------------+-----------------+-------------+--------------+
| Calcium     | 9.8                      | 8.3 - 10.5      | [facility]  |              |
|             |                          | mg/dL           | [facility]    |              |
|             |                          |                 | MEDICAL     |              |
|             |                          |                 | CENTER -    |              |
|             |                          |                 | LABORATORY  |              |
+-------------+--------------------------+-----------------+-------------+--------------+
| BUN/Creatin | 29.1                     |                 | [facility]  |              |
| ine Ratio   |                          |                 | [facility]    |              |
|             |                          |                 | MEDICAL     |              |
|             |                          |                 | CENTER -    |              |
|             |                          |                 | LABORATORY  |              |
+-------------+--------------------------+-----------------+-------------+--------------+
 
 
 
 
+----------+
| Specimen |
+----------+
| Blood    |
+----------+
 
 
 
+----------------------+--------------------+--------------------+----------------+
| Performing           | Address            | City/State/Zipcode | Phone Number   |
| Organization         |                    |                    |                |
+----------------------+--------------------+--------------------+----------------+
|   [facility]     |   [address] | [place]    |   [phone] |
| [facility]  |                    | [address]              |                |
| - LABORATORY         |                    |                    |                |
+----------------------+--------------------+--------------------+----------------+
 ECG 12 lead (2018  3:01 PM PDT)
 
+-------------+--------------------------+-----------+------------+--------------+
| Component   | Value                    | Ref Range | Performed  | Pathologist  |
|             |                          |           | At         | Signature    |
+-------------+--------------------------+-----------+------------+--------------+
| VENTRICULAR | 70                       | BPM       | [facility]  |              |
|  RATE EKG   |                          |           |            |              |
+-------------+--------------------------+-----------+------------+--------------+
| ATRIAL RATE | 70                       | BPM       | [facility]  |              |
+-------------+--------------------------+-----------+------------+--------------+
| P-R         | 214                      | ms        | [facility]  |              |
| INTERVAL    |                          |           |            |              |
+-------------+--------------------------+-----------+------------+--------------+
| QRS         | 78                       | ms        | [facility]  |              |
| DURATION    |                          |           |            |              |
+-------------+--------------------------+-----------+------------+--------------+
| Q-T         | 392                      | ms        | [facility]  |              |
| INTERVAL    |                          |           |            |              |
+-------------+--------------------------+-----------+------------+--------------+
| Q-T         | 423                      | ms        | [facility]  |              |
| INTERVAL    |                          |           |            |              |
| (CORRECTED) |                          |           |            |              |
+-------------+--------------------------+-----------+------------+--------------+
| P WAVE AXIS | 88                       | degrees   | [facility]  |              |
+-------------+--------------------------+-----------+------------+--------------+
| QRS AXIS    | -48                      | degrees   | [facility]  |              |
+-------------+--------------------------+-----------+------------+--------------+
| T AXIS      | 24                       | degrees   | [facility]  |              |
+-------------+--------------------------+-----------+------------+--------------+
| INTERPRETAT | Sinus rhythm with 1st    |           | [facility]  |              |
| ION TEXT    | degree AV block with     |           |            |              |
|             | frequent premature       |           |            |              |
|             | ventricular              |           |            |              |
|             | complexesLeft axis       |           |            |              |
|             | deviationPulmonary       |           |            |              |
|             | disease                  |           |            |              |
|             | patternNonspecific ST    |           |            |              |
|             | abnormalityAbnormal      |           |            |              |
|             | ECGWhen compared with    |           |            |              |
|             | ECG of 2017       |           |            |              |
|             | 14:19,premature          |           |            |              |
|             | ventricular complexes    |           |            |              |
 
|             | are now presentConfirmed |           |            |              |
|             |  by ABEBE TOWNSEND MD |           |            |              |
|             |  ([id number]) on 2018    |           |            |              |
|             | 3:58:44 PM               |           |            |              |
+-------------+--------------------------+-----------+------------+--------------+
 
 
 
+----------+
| Specimen |
+----------+
|          |
+----------+
 
 
 
+----------------------------------------------------------+--------------+
| Narrative                                                | Performed At |
+----------------------------------------------------------+--------------+
|   This result has an attachment that is not available.   |              |
+----------------------------------------------------------+--------------+
 
 
 
+--------------+---------+--------------------+--------------+
| Performing   | Address | City/State/Zipcode | Phone Number |
| Organization |         |                    |              |
+--------------+---------+--------------------+--------------+
|   [facility]  |         |                    |              |
+--------------+---------+--------------------+--------------+
 documented in this encounter
 
 Visit Diagnoses
 
 
+-------------------------------------------------------------------------------------+
| Diagnosis                                                                           |
+-------------------------------------------------------------------------------------+
|   Hypertension, essential - Primary  Unspecified essential hypertension             |
+-------------------------------------------------------------------------------------+
|   Coronary artery disease involving native coronary artery of native heart without  |
| angina pectoris                                                                     |
+-------------------------------------------------------------------------------------+
|   Mixed hyperlipidemia                                                              |
+-------------------------------------------------------------------------------------+
|   PVC (premature ventricular contraction)  Other premature beats                    |
+-------------------------------------------------------------------------------------+
|   Hypothyroidism, unspecified type                                                  |
+-------------------------------------------------------------------------------------+
|   Vitamin D deficiency  Unspecified vitamin D deficiency                            |
+-------------------------------------------------------------------------------------+
 documented in this encounter

## 2020-01-08 NOTE — XMS
Encounter Summary
  Created on: 2020
 
 Viviana Ricci
 External Reference #: [id number]
 : 47
 Sex: Female
 
 Demographics
 
 
+-----------------------+----------------------+
| Address               | [address]      |
|                       | [place]  [address] |
+-----------------------+----------------------+
| Home Phone            | [phone]      |
+-----------------------+----------------------+
| Preferred Language    | Unknown              |
+-----------------------+----------------------+
| Marital Status        | Single               |
+-----------------------+----------------------+
| Mosque Affiliation | [id number]                 |
+-----------------------+----------------------+
| Race                  | Unknown              |
+-----------------------+----------------------+
| Ethnic Group          | Unknown              |
+-----------------------+----------------------+
 
 
 Author
 
 
+--------------+--------------------------------------------+
| Author       | [facility]  |
|              | and [facility]                                |
+--------------+--------------------------------------------+
| Organization | [facility]  |
|              | and [facility]                                |
+--------------+--------------------------------------------+
| Address      | Unknown                                    |
+--------------+--------------------------------------------+
| Phone        | Unavailable                                |
+--------------+--------------------------------------------+
 
 
 
 Support
 
 
+----------------+--------------+---------------------+-----------------+
| Name           | Relationship | Address             | Phone           |
+----------------+--------------+---------------------+-----------------+
| Ada/Ed Radhames | ECON         | [place]              | [phone] |
|                |              | [place]  |                 |
|                |              |  [address]              |                 |
+----------------+--------------+---------------------+-----------------+
 
 
 
 
 Care Team Providers
 
 
+-----------------------+------+-------------+
| Care Team Member Name | Role | Phone       |
+-----------------------+------+-------------+
 PCP  | Unavailable |
+-----------------------+------+-------------+
 
 
 
 Reason for Visit
 
 
+-------------------+----------+
| Reason            | Comments |
+-------------------+----------+
| Medication Refill |          |
+-------------------+----------+
 
 
 
 Encounter Details
 
 
+--------+--------+---------------------+----------------------+-------------------+
| Date   | Type   | Department          | Care Team            | Description       |
+--------+--------+---------------------+----------------------+-------------------+
| / | Refill |   [facility]         |   Marzena Moser  | Medication Refill |
|    |        | NEPHROLOGY  [address]   | M, DO  [address]      |                   |
|        |        | [address]   | [address]      |                   |
|        |        | [place], [place]     | [place], [place]      |                   |
|        |        | [address]          | [address]  [phone]  |                   |
|        |        | [phone]        |  [phone] (Fax)  |                   |
+--------+--------+---------------------+----------------------+-------------------+
 
 
 
 Social History
 
 
+--------------+-------+-----------+--------+------+
| Tobacco Use  | Types | Packs/Day | Years  | Date |
|              |       |           | Used   |      |
+--------------+-------+-----------+--------+------+
| Never Smoker |       |           |        |      |
+--------------+-------+-----------+--------+------+
 
 
 
+---------------------+---+---+---+
| Smokeless Tobacco:  |   |   |   |
| Never Used          |   |   |   |
+---------------------+---+---+---+
 
 
 
+---------------------------------------------------------------+
| Comments: some second hand smoke exposure, but fairly minimal |
 
+---------------------------------------------------------------+
 
 
 
+-------------+-------------+---------+----------+
| Alcohol Use | Drinks/Week | oz/Week | Comments |
+-------------+-------------+---------+----------+
| No          |             |         |          |
+-------------+-------------+---------+----------+
 
 
 
+------------------+---------------+
| Sex Assigned at  | Date Recorded |
| Birth            |               |
+------------------+---------------+
| Not on file      |               |
+------------------+---------------+
 
 
 
+----------------+-------------+-------------+
| Job Start Date | Occupation  | Industry    |
+----------------+-------------+-------------+
| Not on file    | Not on file | Not on file |
+----------------+-------------+-------------+
 
 
 
+----------------+--------------+------------+
| Travel History | Travel Start | Travel End |
+----------------+--------------+------------+
 
 
 
+-------------------------------------+
| No recent travel history available. |
+-------------------------------------+
 documented as of this encounter
 
 Plan of Treatment
 
 
+--------+-----------+------------+----------------------+-------------------+
| Date   | Type      | Specialty  | Care Team            | Description       |
+--------+-----------+------------+----------------------+-------------------+
| / | Office    | Cardiology |   HellalfredoTonia,    |                   |
|    | Visit     |            | ARNP  [address]   |                   |
|        |           |            | St  [place], [place]  |                   |
|        |           |            | [address]  [phone]  |                   |
|        |           |            |  [phone] (Fax)  |                   |
+--------+-----------+------------+----------------------+-------------------+
| / | Hospital  | Radiology  |   Popeye Townsend, |                   |
|    | Encounter |            |  MD  [address] |                   |
|        |           |            |  St.  [place],   |                   |
|        |           |            | [place] [address]             |                   |
|        |           |            | [phone]         |                   |
|        |           |            | [phone] (Fax)   |                   |
+--------+-----------+------------+----------------------+-------------------+
| / | Surgery   | Radiology  |   Popeye Townsend, | CV EP PPM SYSTEM  |
 
|    |           |            |  MD  [address] | IMPLANT           |
|        |           |            |  St.  [place],   |                   |
|        |           |            | [place] [address]             |                   |
|        |           |            | [phone]         |                   |
|        |           |            | [phone] (Fax)   |                   |
+--------+-----------+------------+----------------------+-------------------+
| 02/10/ | Clinical  | Cardiology |                      |                   |
|    | Support   |            |                      |                   |
+--------+-----------+------------+----------------------+-------------------+
| / | Office    | Cardiology |   Tonia Wilson,    |                   |
|    | Visit     |            | ARNP  [address]   |                   |
|        |           |            | [place]  |                   |
|        |           |            | [address]  [phone]  |                   |
|        |           |            |  [phone] (Fax)  |                   |
+--------+-----------+------------+----------------------+-------------------+
| / | Off-Site  | Nephrology |   Marzena Moser  |                   |
|    | Visit     |            | DO ETIENNE  [address] [place] [address]      |                   |
|        |           |            | [address]      |                   |
|        |           |            | [place]      |                   |
|        |           |            | [address]  [phone]  |                   |
|        |           |            |  [phone] (Fax)  |                   |
+--------+-----------+------------+----------------------+-------------------+
 documented as of this encounter
 
 Visit Diagnoses
 Not on filedocumented in this encounter

## 2020-01-08 NOTE — XMS
Encounter Summary
  Created on: 2020
 
 Viviana Ricci
 External Reference #: [id number]
 : 47
 Sex: Female
 
 Demographics
 
 
+-----------------------+----------------------+
| Address               | [address]      |
|                       | [place]  [address] |
+-----------------------+----------------------+
| Home Phone            | [phone]      |
+-----------------------+----------------------+
| Preferred Language    | Unknown              |
+-----------------------+----------------------+
| Marital Status        | Single               |
+-----------------------+----------------------+
| Rastafarian Affiliation | [id number]                 |
+-----------------------+----------------------+
| Race                  | Unknown              |
+-----------------------+----------------------+
| Ethnic Group          | Unknown              |
+-----------------------+----------------------+
 
 
 Author
 
 
+--------------+--------------------------------------------+
| Author       | [facility]  |
|              | and [place]                                |
+--------------+--------------------------------------------+
| Organization | [facility]  |
|              | and [place]                                |
+--------------+--------------------------------------------+
| Address      | Unknown                                    |
+--------------+--------------------------------------------+
| Phone        | Unavailable                                |
+--------------+--------------------------------------------+
 
 
 
 Support
 
 
+----------------+--------------+---------------------+-----------------+
| Name           | Relationship | Address             | Phone           |
+----------------+--------------+---------------------+-----------------+
| Ada/Ed Radhames | ECON         | [place]              | [phone] |
|                |              | [place]  |                 |
|                |              |  [address]              |                 |
+----------------+--------------+---------------------+-----------------+
 
 
 
 
 Care Team Providers
 
 
+-----------------------+------+-------------+
| Care Team Member Name | Role | Phone       |
+-----------------------+------+-------------+
 PCP  | Unavailable |
+-----------------------+------+-------------+
 
 
 
 Reason for Visit
 
 
+--------+----------+
| Reason | Comments |
+--------+----------+
| Other  |          |
+--------+----------+
 
 
 
 Encounter Details
 
 
+--------+-----------+----------------------+----------------------+-------------+
| Date   | Type      | Department           | Care Team            | Description |
+--------+-----------+----------------------+----------------------+-------------+
| / | Telephone |   [facility]          |   Maricruz Flanagan, | Other       |
|    |           | PULMONARY  [address]     |  RN                  |             |
|        |           | [address]  [place], |                      |             |
|        |           |  [place] [address]       |                      |             |
|        |           | [phone]         |                      |             |
+--------+-----------+----------------------+----------------------+-------------+
 
 
 
 Social History
 
 
+--------------+-------+-----------+--------+------+
| Tobacco Use  | Types | Packs/Day | Years  | Date |
|              |       |           | Used   |      |
+--------------+-------+-----------+--------+------+
| Never Smoker |       |           |        |      |
+--------------+-------+-----------+--------+------+
 
 
 
+---------------------+---+---+---+
| Smokeless Tobacco:  |   |   |   |
| Never Used          |   |   |   |
+---------------------+---+---+---+
 
 
 
+---------------------------------------------------------------+
| Comments: some second hand smoke exposure, but fairly minimal |
+---------------------------------------------------------------+
 
 
 
 
+-------------+-------------+---------+----------+
| Alcohol Use | Drinks/Week | oz/Week | Comments |
+-------------+-------------+---------+----------+
| No          |             |         |          |
+-------------+-------------+---------+----------+
 
 
 
+------------------+---------------+
| Sex Assigned at  | Date Recorded |
| Birth            |               |
+------------------+---------------+
| Not on file      |               |
+------------------+---------------+
 
 
 
+----------------+-------------+-------------+
| Job Start Date | Occupation  | Industry    |
+----------------+-------------+-------------+
| Not on file    | Not on file | Not on file |
+----------------+-------------+-------------+
 
 
 
+----------------+--------------+------------+
| Travel History | Travel Start | Travel End |
+----------------+--------------+------------+
 
 
 
+-------------------------------------+
| No recent travel history available. |
+-------------------------------------+
 documented as of this encounter
 
 Plan of Treatment
 
 
+--------+-----------+------------+----------------------+-------------------+
| Date   | Type      | Specialty  | Care Team            | Description       |
+--------+-----------+------------+----------------------+-------------------+
| / | Office    | Cardiology |   Tonia Wilson,    |                   |
|    | Visit     |            | ARNP  [address]   |                   |
|        |           |            | St  [place] [facility], [place]  |                   |
|        |           |            | [address]  [phone]  |                   |
|        |           |            |  [phone] (Fax)  |                   |
+--------+-----------+------------+----------------------+-------------------+
| / | Hospital  | Radiology  |   Popeye Townsend, |                   |
|    | Encounter |            |  MD  [address] |                   |
|        |           |            |  St.  [place],   |                   |
|        |           |            | [place] [address]             |                   |
|        |           |            | [phone]         |                   |
|        |           |            | [phone] (Fax)   |                   |
+--------+-----------+------------+----------------------+-------------------+
| / | Surgery   | Radiology  |   Popeye Townsend, | CV EP PPM SYSTEM  |
|    |           |            |  MD  [address] | IMPLANT           |
 
|        |           |            |  St.  [place],   |                   |
|        |           |            | [place] [address]             |                   |
|        |           |            | [phone]         |                   |
|        |           |            | [phone] (Fax)   |                   |
+--------+-----------+------------+----------------------+-------------------+
| 02/10/ | Clinical  | Cardiology |                      |                   |
|    | Support   |            |                      |                   |
+--------+-----------+------------+----------------------+-------------------+
| / | Office    | Cardiology |   Hellberg, Tonia,    |                   |
|    | Visit     |            | [facility]  [address]   |                   |
|        |           |            | [place]  |                   |
|        |           |            | [address]  [phone]  |                   |
|        |           |            |  [phone] (Fax)  |                   |
+--------+-----------+------------+----------------------+-------------------+
| / | Off-Site  | Nephrology |   Marzena Moser  |                   |
|    | Visit     |            | DO ETIENNE  [address] [place] [address]      |                   |
|        |           |            | [address]      |                   |
|        |           |            | [place]      |                   |
|        |           |            | [address] [phone]  |                   |
|        |           |            |  [phone] (Fax)  |                   |
+--------+-----------+------------+----------------------+-------------------+
 documented as of this encounter
 
 Visit Diagnoses
 Not on filedocumented in this encounter no sinus pressure/no epitaxis

## 2020-01-08 NOTE — XMS
Encounter Summary
  Created on: 2020
 
 Viviana Ricci
 External Reference #: [id number]
 : 47
 Sex: Female
 
 Demographics
 
 
+-----------------------+----------------------+
| Address               | [address]      |
|                       | [place]  [address] |
+-----------------------+----------------------+
| Home Phone            | [phone]      |
+-----------------------+----------------------+
| Preferred Language    | Unknown              |
+-----------------------+----------------------+
| Marital Status        | Single               |
+-----------------------+----------------------+
| Synagogue Affiliation | [id number]                 |
+-----------------------+----------------------+
| Race                  | Unknown              |
+-----------------------+----------------------+
| Ethnic Group          | Unknown              |
+-----------------------+----------------------+
 
 
 Author
 
 
+--------------+--------------------------------------------+
| Author       | [facility]  |
|              | and [place]                                |
+--------------+--------------------------------------------+
| Organization | [facility]  |
|              | and [place]                                |
+--------------+--------------------------------------------+
| Address      | Unknown                                    |
+--------------+--------------------------------------------+
| Phone        | Unavailable                                |
+--------------+--------------------------------------------+
 
 
 
 Support
 
 
+----------------+--------------+---------------------+-----------------+
| Name           | Relationship | Address             | Phone           |
+----------------+--------------+---------------------+-----------------+
| Ada/Ed Radhames | ECON         | [address]              | [phone] |
|                |              | [place]  |                 |
|                |              |  [address]              |                 |
+----------------+--------------+---------------------+-----------------+
 
 
 
 
 Care Team Providers
 
 
+-----------------------+------+-------------+
| Care Team Member Name | Role | Phone       |
+-----------------------+------+-------------+
 PCP  | Unavailable |
+-----------------------+------+-------------+
 
 
 
 Encounter Details
 
 
+--------+-----------+----------------------+----------------------+-------------+
| Date   | Type      | Department           | Care Team            | Description |
+--------+-----------+----------------------+----------------------+-------------+
| / | [facility]  |   [facility] |   Marzena Moser  |             |
|    | Encounter |  [facility]  | M, DO  [address]      |             |
|        |           |  [address]  [place] | [address]      |             |
|        |           |  [place]           | [place]      |             |
|        |           | [address]           | [address]  [phone]  |             |
|        |           | [phone]         |  [phone] (Fax)  |             |
+--------+-----------+----------------------+----------------------+-------------+
 
 
 
 Social History
 
 
+----------------+-------+-----------+--------+------+
| Tobacco Use    | Types | Packs/Day | Years  | Date |
|                |       |           | Used   |      |
+----------------+-------+-----------+--------+------+
| Never Assessed |       |           |        |      |
+----------------+-------+-----------+--------+------+
 
 
 
+------------------+---------------+
| Sex Assigned at  | Date Recorded |
| Birth            |               |
+------------------+---------------+
| Not on file      |               |
+------------------+---------------+
 
 
 
+----------------+-------------+-------------+
| Job Start Date | Occupation  | Industry    |
+----------------+-------------+-------------+
| Not on file    | Not on file | Not on file |
+----------------+-------------+-------------+
 
 
 
+----------------+--------------+------------+
| Travel History | Travel Start | Travel End |
+----------------+--------------+------------+
 
 
 
 
+-------------------------------------+
| No recent travel history available. |
+-------------------------------------+
 documented as of this encounter
 
 Plan of Treatment
 
 
+--------+-----------+------------+----------------------+-------------------+
| Date   | Type      | Specialty  | Care Team            | Description       |
+--------+-----------+------------+----------------------+-------------------+
| / | Office    | Cardiology |   Tonia Wilson,    |                   |
| 2020   | Visit     |            | ARNJESSICA  [address]   |                   |
|        |           |            | St [place], [place]  |                   |
|        |           |            | [address]  [phone]  |                   |
|        |           |            |  [phone] (Fax)  |                   |
+--------+-----------+------------+----------------------+-------------------+
| / | Hospital  | Radiology  |   Popeye Townsend, |                   |
|    | Encounter |            |  MD [address] |                   |
|        |           |            |  [place],   |                   |
|        |           |            | [place] [address]             |                   |
|        |           |            | [phone]         |                   |
|        |           |            | [phone] (Fax)   |                   |
+--------+-----------+------------+----------------------+-------------------+
| / | Surgery   | Radiology  |   Popeye Townsend, | CV EP PPM SYSTEM  |
|    |           |            |  MD  [address] | IMPLANT           |
|        |           |            |  [place],   |                   |
|        |           |            | [place] [address]             |                   |
|        |           |            | [phone]         |                   |
|        |           |            | [phone] (Fax)   |                   |
+--------+-----------+------------+----------------------+-------------------+
| 02/10/ | Clinical  | Cardiology |                      |                   |
|    | Support   |            |                      |                   |
+--------+-----------+------------+----------------------+-------------------+
| / | Office    | Cardiology |   Tonia Wilson,    |                   |
|    | Visit     |            | BELKIS  [address]   |                   |
|        |           |            | [place]  |                   |
|        |           |            | [address]  [phone]  |                   |
|        |           |            |  [phone] (Fax)  |                   |
+--------+-----------+------------+----------------------+-------------------+
| / | Off-Site  | Nephrology |   Marzena Moser  |                   |
|    | Visit     |            | M, DO  [address]      |                   |
|        |           |            | [address]      |                   |
|        |           |            | [place]      |                   |
|        |           |            | [address] [phone]  |                   |
|        |           |            |  [phone] (Fax)  |                   |
+--------+-----------+------------+----------------------+-------------------+
 documented as of this encounter
 
 Visit Diagnoses
 Not on filedocumented in this encounter

## 2020-01-08 NOTE — XMS
Encounter Summary
  Created on: 2020
 
 Viviana Ricci
 External Reference #: [id number]
 : 47
 Sex: Female
 
 Demographics
 
 
+-----------------------+----------------------+
| Address               | [address]      |
|                       | [place]  [address] |
+-----------------------+----------------------+
| Home Phone            | [phone]      |
+-----------------------+----------------------+
| Preferred Language    | Unknown              |
+-----------------------+----------------------+
| Marital Status        | Single               |
+-----------------------+----------------------+
| Anabaptist Affiliation | [id number]                 |
+-----------------------+----------------------+
| Race                  | Unknown              |
+-----------------------+----------------------+
| Ethnic Group          | Unknown              |
+-----------------------+----------------------+
 
 
 Author
 
 
+--------------+--------------------------------------------+
| Author       | [facility]  |
|              | and [place]                                |
+--------------+--------------------------------------------+
| Organization | [facility]  |
|              | and [place]                                |
+--------------+--------------------------------------------+
| Address      | Unknown                                    |
+--------------+--------------------------------------------+
| Phone        | Unavailable                                |
+--------------+--------------------------------------------+
 
 
 
 Support
 
 
+----------------+--------------+---------------------+-----------------+
| Name           | Relationship | Address             | Phone           |
+----------------+--------------+---------------------+-----------------+
| Ada/Ed Radhames | ECON         | [address]              | [phone] |
|                |              | [place]  |                 |
|                |              |  [address]              |                 |
+----------------+--------------+---------------------+-----------------+
 
 
 
 
 Care Team Providers
 
 
+------------------------+------+-----------------+
| Care Team Member Name  | Role | Phone           |
+------------------------+------+-----------------+
| Marzena Moser DO | PCP  | [phone] |
+------------------------+------+-----------------+
 
 
 
 Reason for Visit
 
 
+-------------------+----------+
| Reason            | Comments |
+-------------------+----------+
| Medication Refill |          |
+-------------------+----------+
 
 
 
 Encounter Details
 
 
+--------+--------+---------------------+----------------------+-------------------+
| Date   | Type   | Department          | Care Team            | Description       |
+--------+--------+---------------------+----------------------+-------------------+
| / | Refill |   [facility]         |   Marzena Moser  | Medication Refill |
| 2018   |        | NEPHROLOGY  [address]   | M, DO  [address]      |                   |
|        |        | [address]   | [address]      |                   |
|        |        | [place], [place]     | [place], [place]      |                   |
|        |        | [address]          | [address]  [phone]  |                   |
|        |        | [phone]        |  [phone] (Fax)  |                   |
+--------+--------+---------------------+----------------------+-------------------+
 
 
 
 Social History
 
 
+--------------+-------+-----------+--------+------+
| Tobacco Use  | Types | Packs/Day | Years  | Date |
|              |       |           | Used   |      |
+--------------+-------+-----------+--------+------+
| Never Smoker |       |           |        |      |
+--------------+-------+-----------+--------+------+
 
 
 
+---------------------+---+---+---+
| Smokeless Tobacco:  |   |   |   |
| Never Used          |   |   |   |
+---------------------+---+---+---+
 
 
 
+---------------------------------------------------------------+
| Comments: some second hand smoke exposure, but fairly minimal |
 
+---------------------------------------------------------------+
 
 
 
+-------------+-------------+---------+----------+
| Alcohol Use | Drinks/Week | oz/Week | Comments |
+-------------+-------------+---------+----------+
| No          |             |         |          |
+-------------+-------------+---------+----------+
 
 
 
+------------------+---------------+
| Sex Assigned at  | Date Recorded |
| Birth            |               |
+------------------+---------------+
| Not on file      |               |
+------------------+---------------+
 
 
 
+----------------+-------------+-------------+
| Job Start Date | Occupation  | Industry    |
+----------------+-------------+-------------+
| Not on file    | Not on file | Not on file |
+----------------+-------------+-------------+
 
 
 
+----------------+--------------+------------+
| Travel History | Travel Start | Travel End |
+----------------+--------------+------------+
 
 
 
+-------------------------------------+
| No recent travel history available. |
+-------------------------------------+
 documented as of this encounter
 
 Plan of Treatment
 
 
+--------+-----------+------------+----------------------+-------------------+
| Date   | Type      | Specialty  | Care Team            | Description       |
+--------+-----------+------------+----------------------+-------------------+
| / | Office    | Cardiology |   Tonia Wlison,    |                   |
|    | Visit     |            | ARNP  [address]   |                   |
|        |           |            | St [place], [place]  |                   |
|        |           |            | [address]  [phone]  |                   |
|        |           |            |  [phone] (Fax)  |                   |
+--------+-----------+------------+----------------------+-------------------+
| / | Hospital  | Radiology  |   Popeye Townsend, |                   |
|    | Encounter |            |  MD  [address] |                   |
|        |           |            |  [place],   |                   |
|        |           |            | [place] [address]             |                   |
|        |           |            | [phone]         |                   |
|        |           |            | [phone] (Fax)   |                   |
+--------+-----------+------------+----------------------+-------------------+
| / | Surgery   | Radiology  |   Popeye Townsend, | CV EP PPM SYSTEM  |
 
|    |           |            |  MD  [address] | IMPLANT           |
|        |           |            |  [place],   |                   |
|        |           |            | [place] [address]             |                   |
|        |           |            | [phone]         |                   |
|        |           |            | [phone] (Fax)   |                   |
+--------+-----------+------------+----------------------+-------------------+
| 02/10/ | Clinical  | Cardiology |                      |                   |
|    | Support   |            |                      |                   |
+--------+-----------+------------+----------------------+-------------------+
| / | Office    | Cardiology |   RakeshTonia andre,    |                   |
|    | Visit     |            | ARNP  [address]   |                   |
|        |           |            | [place]  |                   |
|        |           |            | [address] [phone]  |                   |
|        |           |            |  [phone] (Fax)  |                   |
+--------+-----------+------------+----------------------+-------------------+
| / | Off-Site  | Nephrology |   Marzena Moser  |                   |
|    | Visit     |            | DO ETIENNE  [address] [place] [address]      |                   |
|        |           |            | [address]      |                   |
|        |           |            | [place]      |                   |
|        |           |            | [address] [phone]  |                   |
|        |           |            |  [phone] (Fax)  |                   |
+--------+-----------+------------+----------------------+-------------------+
 documented as of this encounter
 
 Visit Diagnoses
 Not on filedocumented in this encounter

## 2020-01-08 NOTE — XMS
Encounter Summary
  Created on: 2020
 
 Viviana Ricci
 External Reference #: [id number]
 : 47
 Sex: Female
 
 Demographics
 
 
+-----------------------+----------------------+
| Address               | [address]      |
|                       | [place]  [address] |
+-----------------------+----------------------+
| Home Phone            | [phone]      |
+-----------------------+----------------------+
| Preferred Language    | Unknown              |
+-----------------------+----------------------+
| Marital Status        | Single               |
+-----------------------+----------------------+
| Latter-day Affiliation | [id number]                 |
+-----------------------+----------------------+
| Race                  | Unknown              |
+-----------------------+----------------------+
| Ethnic Group          | Unknown              |
+-----------------------+----------------------+
 
 
 Author
 
 
+--------------+--------------------------------------------+
| Author       | [facility]  |
|              | and [facility]                                |
+--------------+--------------------------------------------+
| Organization | [facility]  |
|              | and [facility]                                |
+--------------+--------------------------------------------+
| Address      | Unknown                                    |
+--------------+--------------------------------------------+
| Phone        | Unavailable                                |
+--------------+--------------------------------------------+
 
 
 
 Support
 
 
+----------------+--------------+---------------------+-----------------+
| Name           | Relationship | Address             | Phone           |
+----------------+--------------+---------------------+-----------------+
| Ada/Ed Radhames | ECON         | [place]              | [phone] |
|                |              | [place]  |                 |
|                |              |  [address]              |                 |
+----------------+--------------+---------------------+-----------------+
 
 
 
 
 Care Team Providers
 
 
+-----------------------+------+-------------+
| Care Team Member Name | Role | Phone       |
+-----------------------+------+-------------+
 PCP  | Unavailable |
+-----------------------+------+-------------+
 
 
 
 Reason for Visit
 
 
+-------------------+----------+
| Reason            | Comments |
+-------------------+----------+
| Medication Refill |          |
+-------------------+----------+
 
 
 
 Encounter Details
 
 
+--------+--------+---------------------+----------------------+-------------------+
| Date   | Type   | Department          | Care Team            | Description       |
+--------+--------+---------------------+----------------------+-------------------+
| 07/15/ | Refill |   [facility]         |   Marzena Moser  | Medication Refill |
|    |        | NEPHROLOGY  [address]   | M, DO  [address]      |                   |
|        |        | [address]   | [address]      |                   |
|        |        | [place], [place]     | [place], [place]      |                   |
|        |        | [address]          | [address]  [phone]  |                   |
|        |        | [phone]        |  [phone] (Fax)  |                   |
+--------+--------+---------------------+----------------------+-------------------+
 
 
 
 Social History
 
 
+--------------+-------+-----------+--------+------+
| Tobacco Use  | Types | Packs/Day | Years  | Date |
|              |       |           | Used   |      |
+--------------+-------+-----------+--------+------+
| Never Smoker |       |           |        |      |
+--------------+-------+-----------+--------+------+
 
 
 
+---------------------+---+---+---+
| Smokeless Tobacco:  |   |   |   |
| Never Used          |   |   |   |
+---------------------+---+---+---+
 
 
 
+---------------------------------------------------------------+
| Comments: some second hand smoke exposure, but fairly minimal |
 
+---------------------------------------------------------------+
 
 
 
+-------------+-------------+---------+----------+
| Alcohol Use | Drinks/Week | oz/Week | Comments |
+-------------+-------------+---------+----------+
| No          |             |         |          |
+-------------+-------------+---------+----------+
 
 
 
+------------------+---------------+
| Sex Assigned at  | Date Recorded |
| Birth            |               |
+------------------+---------------+
| Not on file      |               |
+------------------+---------------+
 
 
 
+----------------+-------------+-------------+
| Job Start Date | Occupation  | Industry    |
+----------------+-------------+-------------+
| Not on file    | Not on file | Not on file |
+----------------+-------------+-------------+
 
 
 
+----------------+--------------+------------+
| Travel History | Travel Start | Travel End |
+----------------+--------------+------------+
 
 
 
+-------------------------------------+
| No recent travel history available. |
+-------------------------------------+
 documented as of this encounter
 
 Plan of Treatment
 
 
+--------+-----------+------------+----------------------+-------------------+
| Date   | Type      | Specialty  | Care Team            | Description       |
+--------+-----------+------------+----------------------+-------------------+
| / | Office    | Cardiology |   HellalfredoTonia,    |                   |
|    | Visit     |            | ARNP  [address]   |                   |
|        |           |            | St  [place], [place]  |                   |
|        |           |            | [address]  [phone]  |                   |
|        |           |            |  [phone] (Fax)  |                   |
+--------+-----------+------------+----------------------+-------------------+
| / | Hospital  | Radiology  |   Popeye [place], |                   |
|    | Encounter |            |  MD  [address] |                   |
|        |           |            |  St.  [place],   |                   |
|        |           |            | [place] [address]             |                   |
|        |           |            | [phone]         |                   |
|        |           |            | [phone] (Fax)   |                   |
+--------+-----------+------------+----------------------+-------------------+
| / | Surgery   | Radiology  |   Popeye Townsend, | CV EP PPM SYSTEM  |
 
|    |           |            |  MD  [address] | IMPLANT           |
|        |           |            |  St.  [place],   |                   |
|        |           |            | [place] [address]             |                   |
|        |           |            | [phone]         |                   |
|        |           |            | [phone] (Fax)   |                   |
+--------+-----------+------------+----------------------+-------------------+
| 02/10/ | Clinical  | Cardiology |                      |                   |
|    | Support   |            |                      |                   |
+--------+-----------+------------+----------------------+-------------------+
| / | Office    | Cardiology |   Tonia Wilson,    |                   |
|    | Visit     |            | ARNP  [address]   |                   |
|        |           |            | [place]  |                   |
|        |           |            | [address]  [phone]  |                   |
|        |           |            |  [phone] (Fax)  |                   |
+--------+-----------+------------+----------------------+-------------------+
| / | Off-Site  | Nephrology |   Marzena Moser  |                   |
|    | Visit     |            | DO ETIENNE  [address] [place] [address]      |                   |
|        |           |            | [address]      |                   |
|        |           |            | [place]      |                   |
|        |           |            | [address]  [phone]  |                   |
|        |           |            |  [phone] (Fax)  |                   |
+--------+-----------+------------+----------------------+-------------------+
 documented as of this encounter
 
 Visit Diagnoses
 
 
+------------------------------------------------------------------------------------------+
| Diagnosis                                                                                |
+------------------------------------------------------------------------------------------+
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
| stage IV, or unspecified(403.90) - Primary  Unspecified hypertensive kidney disease with |
|  chronic kidney disease stage I through stage IV, or unspecified                         |
+------------------------------------------------------------------------------------------+
|   Complications of transplanted kidney                                                   |
+------------------------------------------------------------------------------------------+
|   DIABETES MELLITUS, TYPE II, UNCONTROLLED  Type II or unspecified type diabetes         |
| mellitus without mention of complication, uncontrolled                                   |
+------------------------------------------------------------------------------------------+
|   Hyperlipidemia  Other and unspecified hyperlipidemia                                   |
+------------------------------------------------------------------------------------------+
 documented in this encounter

## 2020-01-08 NOTE — XMS
Encounter Summary
  Created on: 2020
 
 Viviana Ricci
 External Reference #: [id number]
 : 47
 Sex: Female
 
 Demographics
 
 
+-----------------------+----------------------+
| Address               | [address]      |
|                       | [place]  [address] |
+-----------------------+----------------------+
| Home Phone            | [phone]      |
+-----------------------+----------------------+
| Preferred Language    | Unknown              |
+-----------------------+----------------------+
| Marital Status        | Single               |
+-----------------------+----------------------+
| Druze Affiliation | 1009                 |
+-----------------------+----------------------+
| Race                  | Unknown              |
+-----------------------+----------------------+
| Ethnic Group          | Unknown              |
+-----------------------+----------------------+
 
 
 Author
 
 
+--------------+--------------------------------------------+
| Author       | [facility]  |
|              | and [place]                                |
+--------------+--------------------------------------------+
| Organization | [facility]  |
|              | and [facility]                                |
+--------------+--------------------------------------------+
| Address      | Unknown                                    |
+--------------+--------------------------------------------+
| Phone        | Unavailable                                |
+--------------+--------------------------------------------+
 
 
 
 Support
 
 
+----------------+--------------+---------------------+-----------------+
| Name           | Relationship | Address             | Phone           |
+----------------+--------------+---------------------+-----------------+
| Ada/Ed Radhames | ECON         | [address]              | [phone] |
|                |              | [place]  |                 |
|                |              |  [address]              |                 |
+----------------+--------------+---------------------+-----------------+
 
 
 
 
 Care Team Providers
 
 
+------------------------+------+-----------------+
| Care Team Member Name  | Role | Phone           |
+------------------------+------+-----------------+
| Marzena Moser DO | PCP  | [phone] |
+------------------------+------+-----------------+
 
 
 
 Reason for Visit
 
 
+-------------------+----------+
| Reason            | Comments |
+-------------------+----------+
| Medication Refill |          |
+-------------------+----------+
 
 
 
 Encounter Details
 
 
+--------+--------+---------------------+----------------------+-------------------+
| Date   | Type   | Department          | Care Team            | Description       |
+--------+--------+---------------------+----------------------+-------------------+
| / | Refill |   [facility]         |   Marzena Moser  | Medication Refill |
| 2018   |        | NEPHROLOGY  [address]   | M, DO  [address]      |                   |
|        |        | [address]   | [address]      |                   |
|        |        | [place], [place]     | [place], [place]      |                   |
|        |        | [address]          | [address]  [phone]  |                   |
|        |        | [phone]        |  [phone] (Fax)  |                   |
+--------+--------+---------------------+----------------------+-------------------+
 
 
 
 Social History
 
 
+--------------+-------+-----------+--------+------+
| Tobacco Use  | Types | Packs/Day | Years  | Date |
|              |       |           | Used   |      |
+--------------+-------+-----------+--------+------+
| Never Smoker |       |           |        |      |
+--------------+-------+-----------+--------+------+
 
 
 
+---------------------+---+---+---+
| Smokeless Tobacco:  |   |   |   |
| Never Used          |   |   |   |
+---------------------+---+---+---+
 
 
 
+---------------------------------------------------------------+
| Comments: some second hand smoke exposure, but fairly minimal |
 
+---------------------------------------------------------------+
 
 
 
+-------------+-------------+---------+----------+
| Alcohol Use | Drinks/Week | oz/Week | Comments |
+-------------+-------------+---------+----------+
| No          |             |         |          |
+-------------+-------------+---------+----------+
 
 
 
+------------------+---------------+
| Sex Assigned at  | Date Recorded |
| Birth            |               |
+------------------+---------------+
| Not on file      |               |
+------------------+---------------+
 
 
 
+----------------+-------------+-------------+
| Job Start Date | Occupation  | Industry    |
+----------------+-------------+-------------+
| Not on file    | Not on file | Not on file |
+----------------+-------------+-------------+
 
 
 
+----------------+--------------+------------+
| Travel History | Travel Start | Travel End |
+----------------+--------------+------------+
 
 
 
+-------------------------------------+
| No recent travel history available. |
+-------------------------------------+
 documented as of this encounter
 
 Plan of Treatment
 
 
+--------+-----------+------------+----------------------+-------------------+
| Date   | Type      | Specialty  | Care Team            | Description       |
+--------+-----------+------------+----------------------+-------------------+
| / | Office    | Cardiology |   Tonia Wilson,    |                   |
|    | Visit     |            | ARNP  [address]   |                   |
|        |           |            | St [place], [place]  |                   |
|        |           |            | [address]  [phone]  |                   |
|        |           |            |  [phone] (Fax)  |                   |
+--------+-----------+------------+----------------------+-------------------+
| / | Hospital  | Radiology  |   Popeye Townsend, |                   |
|    | Encounter |            |  MD  [address] |                   |
|        |           |            |  [place],   |                   |
|        |           |            | [place] [address]             |                   |
|        |           |            | [phone]         |                   |
|        |           |            | [phone] (Fax)   |                   |
+--------+-----------+------------+----------------------+-------------------+
| / | Surgery   | Radiology  |   Popeye Townsend, | CV EP PPM SYSTEM  |
 
|    |           |            |  MD  [address] | IMPLANT           |
|        |           |            |  [place],   |                   |
|        |           |            | [place] [address]             |                   |
|        |           |            | [phone]         |                   |
|        |           |            | [phone] (Fax)   |                   |
+--------+-----------+------------+----------------------+-------------------+
| 02/10/ | Clinical  | Cardiology |                      |                   |
|    | Support   |            |                      |                   |
+--------+-----------+------------+----------------------+-------------------+
| / | Office    | Cardiology |   Tonia Wilson,    |                   |
|    | Visit     |            | ARNP  [address]   |                   |
|        |           |            | [place]  |                   |
|        |           |            | [id number] [phone]  |                   |
|        |           |            |  [phone] (Fax)  |                   |
+--------+-----------+------------+----------------------+-------------------+
| / | Off-Site  | Nephrology |   Marzena Moser  |                   |
|    | Visit     |            | DO ETIENNE  [address] [place] [address]      |                   |
|        |           |            | [address]      |                   |
|        |           |            | [place]      |                   |
|        |           |            | [address] [phone]  |                   |
|        |           |            |  [phone] (Fax)  |                   |
+--------+-----------+------------+----------------------+-------------------+
 documented as of this encounter
 
 Visit Diagnoses
 
 
+-------------------------------------------+
| Diagnosis                                 |
+-------------------------------------------+
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
+-------------------------------------------+
 documented in this encounter

## 2020-01-08 NOTE — XMS
Encounter Summary
  Created on: 2020
 
 Viviana Ricci
 External Reference #: [id number]
 : 47
 Sex: Female
 
 Demographics
 
 
+-----------------------+----------------------+
| Address               | [address]      |
|                       | [place]  [address] |
+-----------------------+----------------------+
| Home Phone            | [phone]      |
+-----------------------+----------------------+
| Preferred Language    | Unknown              |
+-----------------------+----------------------+
| Marital Status        | Single               |
+-----------------------+----------------------+
| Congregation Affiliation | 1009                 |
+-----------------------+----------------------+
| Race                  | Unknown              |
+-----------------------+----------------------+
| Ethnic Group          | Unknown              |
+-----------------------+----------------------+
 
 
 Author
 
 
+--------------+--------------------------------------------+
| Author       | [facility]  |
|              | and [facility]                                |
+--------------+--------------------------------------------+
| Organization | [facility]  |
|              | and [facility]                                |
+--------------+--------------------------------------------+
| Address      | Unknown                                    |
+--------------+--------------------------------------------+
| Phone        | Unavailable                                |
+--------------+--------------------------------------------+
 
 
 
 Support
 
 
+----------------+--------------+---------------------+-----------------+
| Name           | Relationship | Address             | Phone           |
+----------------+--------------+---------------------+-----------------+
| Ada/Ed Radhames | ECON         | [address]              | [phone] |
|                |              | [place]  |                 |
|                |              |  [address]              |                 |
+----------------+--------------+---------------------+-----------------+
 
 
 
 
 Care Team Providers
 
 
+-----------------------+------+-------------+
| Care Team Member Name | Role | Phone       |
+-----------------------+------+-------------+
 PCP  | Unavailable |
+-----------------------+------+-------------+
 
 
 
 Reason for Visit
 
 
+-----------+-----------------------------------------+
| Reason    | Comments                                |
+-----------+-----------------------------------------+
| Lab Order | blood draw question/antibiotic question |
+-----------+-----------------------------------------+
 
 
 
 Encounter Details
 
 
+--------+-----------+---------------------+----------------------+----------------------+
| Date   | Type      | Department          | Care Team            | Description          |
+--------+-----------+---------------------+----------------------+----------------------+
| 10/16/ | Telephone |   [facility]         |   Marzena Moser  | Lab Order (blood     |
|    |           | NEPHROLOGY  [address]   | M, DO  [address]      | draw                 |
|        |           | [address]   | [address]      | question/antibiotic  |
|        |           | [place]     | [place]      | question)            |
|        |           | [address]          | [address] [phone]  |                      |
|        |           | [phone]        |  [phone] (Fax)  |                      |
+--------+-----------+---------------------+----------------------+----------------------+
 
 
 
 Social History
 
 
+----------------+-------+-----------+--------+------+
| Tobacco Use    | Types | Packs/Day | Years  | Date |
|                |       |           | Used   |      |
+----------------+-------+-----------+--------+------+
| Never Assessed |       |           |        |      |
+----------------+-------+-----------+--------+------+
 
 
 
+------------------+---------------+
| Sex Assigned at  | Date Recorded |
| Birth            |               |
+------------------+---------------+
| Not on file      |               |
+------------------+---------------+
 
 
 
 
+----------------+-------------+-------------+
| Job Start Date | Occupation  | Industry    |
+----------------+-------------+-------------+
| Not on file    | Not on file | Not on file |
+----------------+-------------+-------------+
 
 
 
+----------------+--------------+------------+
| Travel History | Travel Start | Travel End |
+----------------+--------------+------------+
 
 
 
+-------------------------------------+
| No recent travel history available. |
+-------------------------------------+
 documented as of this encounter
 
 Plan of Treatment
 
 
+--------+-----------+------------+----------------------+-------------------+
| Date   | Type      | Specialty  | Care Team            | Description       |
+--------+-----------+------------+----------------------+-------------------+
| / | Office    | Cardiology |   Tonia Wilson,    |                   |
|    | Visit     |            | ARNP  [address]   |                   |
|        |           |            | [place]  |                   |
|        |           |            | [address] [phone]  |                   |
|        |           |            |  [phone] (Fax)  |                   |
+--------+-----------+------------+----------------------+-------------------+
| / | Hospital  | Radiology  |   Popeye Townsend, |                   |
|    | Encounter |            |  MD  [address] |                   |
|        |           |            |  [place]   |                   |
|        |           |            | [place] [address]             |                   |
|        |           |            | [phone]         |                   |
|        |           |            | [phone] (Fax)   |                   |
+--------+-----------+------------+----------------------+-------------------+
| / | Surgery   | Radiology  |   Popeye Townsend, | CV EP PPM SYSTEM  |
|    |           |            |  MD  [address] | IMPLANT           |
|        |           |            |  St.  [place],   |                   |
|        |           |            | [place] [address]             |                   |
|        |           |            | [phone]         |                   |
|        |           |            | [phone] (Fax)   |                   |
+--------+-----------+------------+----------------------+-------------------+
| 02/10/ | Clinical  | Cardiology |                      |                   |
|    | Support   |            |                      |                   |
+--------+-----------+------------+----------------------+-------------------+
| / | Office    | Cardiology |   Tonia Wilson,    |                   |
|    | Visit     |            | ARNJESSICA  [address]   |                   |
|        |           |            | St  [place]  |                   |
|        |           |            | [address]  [phone]  |                   |
|        |           |            |  [phone] (Fax)  |                   |
+--------+-----------+------------+----------------------+-------------------+
| / | Off-Site  | Nephrology |   Marzena Moser  |                   |
2020   | Visit     |            | DO ETIENNE  [address]      |                   |
|        |           |            | [address]      |                   |
|        |           |            | [place]      |                   |
|        |           |            | [address]  [phone]  |                   |
|        |           |            |  [phone] (Fax)  |                   |
 
+--------+-----------+------------+----------------------+-------------------+
 documented as of this encounter
 
 Visit Diagnoses
 Not on filedocumented in this encounter

## 2020-01-08 NOTE — XMS
Encounter Summary
  Created on: 2020
 
 Viviana Ricci
 External Reference #: [id number]
 : 47
 Sex: Female
 
 Demographics
 
 
+-----------------------+----------------------+
| Address               | [address]      |
|                       | [place]  [address] |
+-----------------------+----------------------+
| Home Phone            | [phone]      |
+-----------------------+----------------------+
| Preferred Language    | Unknown              |
+-----------------------+----------------------+
| Marital Status        | Single               |
+-----------------------+----------------------+
| Yarsanism Affiliation | [id number]                 |
+-----------------------+----------------------+
| Race                  | Unknown              |
+-----------------------+----------------------+
| Ethnic Group          | Unknown              |
+-----------------------+----------------------+
 
 
 Author
 
 
+--------------+--------------------------------------------+
| Author       | [facility]  |
|              | and [place]                                |
+--------------+--------------------------------------------+
| Organization | [facility]  |
|              | and [place]                                |
+--------------+--------------------------------------------+
| Address      | Unknown                                    |
+--------------+--------------------------------------------+
| Phone        | Unavailable                                |
+--------------+--------------------------------------------+
 
 
 
 Support
 
 
+----------------+--------------+---------------------+-----------------+
| Name           | Relationship | Address             | Phone           |
+----------------+--------------+---------------------+-----------------+
| Ada/Ed Radhames | ECON         | [address]              | [phone] |
|                |              | [place]  |                 |
|                |              |  [address]              |                 |
+----------------+--------------+---------------------+-----------------+
 
 
 
 
 Care Team Providers
 
 
+-----------------------+------+-------------+
| Care Team Member Name | Role | Phone       |
+-----------------------+------+-------------+
 PCP  | Unavailable |
+-----------------------+------+-------------+
 
 
 
 Encounter Details
 
 
+--------+-----------+----------------------+----------------------+-------------+
| Date   | Type      | Department           | Care Team            | Description |
+--------+-----------+----------------------+----------------------+-------------+
| / | Hospital  |   [facility] |   Edgar Sanders,   |             |
|    | Encounter |  [facility] OP | MD  [address]      |             |
|        |           |   [address]       | [place]      |             |
|        |           | [place]      | [address]  [phone]  |             |
|        |           | [address]           |  [phone] (Fax)  |             |
|        |           | [phone]         |                      |             |
+--------+-----------+----------------------+----------------------+-------------+
 
 
 
 Social History
 
 
+----------------+-------+-----------+--------+------+
| Tobacco Use    | Types | Packs/Day | Years  | Date |
|                |       |           | Used   |      |
+----------------+-------+-----------+--------+------+
| Never Assessed |       |           |        |      |
+----------------+-------+-----------+--------+------+
 
 
 
+------------------+---------------+
| Sex Assigned at  | Date Recorded |
| Birth            |               |
+------------------+---------------+
| Not on file      |               |
+------------------+---------------+
 
 
 
+----------------+-------------+-------------+
| Job Start Date | Occupation  | Industry    |
+----------------+-------------+-------------+
| Not on file    | Not on file | Not on file |
+----------------+-------------+-------------+
 
 
 
+----------------+--------------+------------+
| Travel History | Travel Start | Travel End |
+----------------+--------------+------------+
 
 
 
 
+-------------------------------------+
| No recent travel history available. |
+-------------------------------------+
 documented as of this encounter
 
 Plan of Treatment
 
 
+--------+-----------+------------+----------------------+-------------------+
| Date   | Type      | Specialty  | Care Team            | Description       |
+--------+-----------+------------+----------------------+-------------------+
| / | Office    | Cardiology |   Tonia Wilson,    |                   |
|    | Visit     |            | ARNP  [address]   |                   |
|        |           |            | St  [place], [place]  |                   |
|        |           |            | [address]  [phone]  |                   |
|        |           |            |  [phone] (Fax)  |                   |
+--------+-----------+------------+----------------------+-------------------+
| / | Hospital  | Radiology  |   Popeye Townsend, |                   |
|    | Encounter |            |  MD  [address] |                   |
|        |           |            |  [place],   |                   |
|        |           |            | [place] [address]             |                   |
|        |           |            | [phone]         |                   |
|        |           |            | [phone] (Fax)   |                   |
+--------+-----------+------------+----------------------+-------------------+
| / | Surgery   | Radiology  |   Popeye Townsend, | CV EP PPM SYSTEM  |
|    |           |            |  MD  [address] | IMPLANT           |
|        |           |            |  [place],   |                   |
|        |           |            | [place] [address]             |                   |
|        |           |            | [phone]         |                   |
|        |           |            | [phone] (Fax)   |                   |
+--------+-----------+------------+----------------------+-------------------+
| 02/10/ | Clinical  | Cardiology |                      |                   |
|    | Support   |            |                      |                   |
+--------+-----------+------------+----------------------+-------------------+
| / | Office    | Cardiology |   Tonia Wilson,    |                   |
|    | Visit     |            | ARNP  [address]   |                   |
|        |           |            | [place]  |                   |
|        |           |            | [address]  [phone]  |                   |
|        |           |            |  [phone] (Fax)  |                   |
+--------+-----------+------------+----------------------+-------------------+
| / | Off-Site  | Nephrology |   Marzena Moser  |                   |
|    | Visit     |            | DO ETIENNE  [address] [place] [address]      |                   |
|        |           |            | [address]      |                   |
|        |           |            | [place]      |                   |
|        |           |            | [address] [phone]  |                   |
|        |           |            |  [phone] (Fax)  |                   |
+--------+-----------+------------+----------------------+-------------------+
 documented as of this encounter
 
 Visit Diagnoses
 Not on filedocumented in this encounter

## 2020-01-08 NOTE — XMS
Encounter Summary
  Created on: 2020
 
 Viviana Ricci
 External Reference #: [id number]
 : 47
 Sex: Female
 
 Demographics
 
 
+-----------------------+----------------------+
| Address               | [address]      |
|                       | [place]  [address] |
+-----------------------+----------------------+
| Home Phone            | [phone]      |
+-----------------------+----------------------+
| Preferred Language    | Unknown              |
+-----------------------+----------------------+
| Marital Status        | Single               |
+-----------------------+----------------------+
| Buddhism Affiliation | 1009                 |
+-----------------------+----------------------+
| Race                  | Unknown              |
+-----------------------+----------------------+
| Ethnic Group          | Unknown              |
+-----------------------+----------------------+
 
 
 Author
 
 
+--------------+--------------------------------------------+
| Author       | [facility]  |
|              | and [place]                                |
+--------------+--------------------------------------------+
| Organization | [facility]  |
|              | and [place]                                |
+--------------+--------------------------------------------+
| Address      | Unknown                                    |
+--------------+--------------------------------------------+
| Phone        | Unavailable                                |
+--------------+--------------------------------------------+
 
 
 
 Support
 
 
+----------------+--------------+---------------------+-----------------+
| Name           | Relationship | Address             | Phone           |
+----------------+--------------+---------------------+-----------------+
| Ada/Ed Radhames | ECON         | [address]              | [phone] |
|                |              | [place]  |                 |
|                |              |  [address]              |                 |
+----------------+--------------+---------------------+-----------------+
 
 
 
 
 Care Team Providers
 
 
+-----------------------+------+-------------+
| Care Team Member Name | Role | Phone       |
+-----------------------+------+-------------+
 PCP  | Unavailable |
+-----------------------+------+-------------+
 
 
 
 Encounter Details
 
 
+--------+----------+---------------------+----------------------+-------------+
| Date   | Type     | Department          | Care Team            | Description |
+--------+----------+---------------------+----------------------+-------------+
| / | Abstract |   [facility] [place]         |   Marzena Moser  |             |
| 2017   |          | NEPHROLOGY  [address]   | M, DO  [address]      |             |
|        |          | [address]   | [address]      |             |
|        |          | [place], [place]     | [place]      |             |
|        |          | [address]          | [address]  [phone]  |             |
|        |          | [phone]        |  [phone] (Fax)  |             |
+--------+----------+---------------------+----------------------+-------------+
 
 
 
 Social History
 
 
+--------------+-------+-----------+--------+------+
| Tobacco Use  | Types | Packs/Day | Years  | Date |
|              |       |           | Used   |      |
+--------------+-------+-----------+--------+------+
| Never Smoker |       |           |        |      |
+--------------+-------+-----------+--------+------+
 
 
 
+---------------------+---+---+---+
| Smokeless Tobacco:  |   |   |   |
| Never Used          |   |   |   |
+---------------------+---+---+---+
 
 
 
+---------------------------------------------------------------+
| Comments: some second hand smoke exposure, but fairly minimal |
+---------------------------------------------------------------+
 
 
 
+-------------+-------------+---------+----------+
| Alcohol Use | Drinks/Week | oz/Week | Comments |
+-------------+-------------+---------+----------+
| No          |             |         |          |
+-------------+-------------+---------+----------+
 
 
 
 
+------------------+---------------+
| Sex Assigned at  | Date Recorded |
| Birth            |               |
+------------------+---------------+
| Not on file      |               |
+------------------+---------------+
 
 
 
+----------------+-------------+-------------+
| Job Start Date | Occupation  | Industry    |
+----------------+-------------+-------------+
| Not on file    | Not on file | Not on file |
+----------------+-------------+-------------+
 
 
 
+----------------+--------------+------------+
| Travel History | Travel Start | Travel End |
+----------------+--------------+------------+
 
 
 
+-------------------------------------+
| No recent travel history available. |
+-------------------------------------+
 documented as of this encounter
 
 Plan of Treatment
 
 
+--------+-----------+------------+----------------------+-------------------+
| Date   | Type      | Specialty  | Care Team            | Description       |
+--------+-----------+------------+----------------------+-------------------+
| / | Office    | Cardiology |   Tonia Wilson,    |                   |
|    | Visit     |            | ARNJESSICA  [address]   |                   |
|        |           |            | [place]  |                   |
|        |           |            | [address]  [phone]  |                   |
|        |           |            |  [phone] (Fax)  |                   |
+--------+-----------+------------+----------------------+-------------------+
| / | Hospital  | Radiology  |   Popeye Townsend, |                   |
|    | Encounter |            |  MD  [address] |                   |
|        |           |            |  St.  [place],   |                   |
|        |           |            | [place] [address]             |                   |
|        |           |            | [phone]         |                   |
|        |           |            | [phone] (Fax)   |                   |
+--------+-----------+------------+----------------------+-------------------+
| / | Surgery   | Radiology  |   Popeye Townsend, | CV EP PPM SYSTEM  |
|    |           |            |  MD  [address] | IMPLANT           |
|        |           |            |  St.  [place],   |                   |
|        |           |            | [place] [address]             |                   |
|        |           |            | [phone]         |                   |
|        |           |            | [phone] (Fax)   |                   |
+--------+-----------+------------+----------------------+-------------------+
| 02/10/ | Clinical  | Cardiology |                      |                   |
|    | Support   |            |                      |                   |
+--------+-----------+------------+----------------------+-------------------+
| / | Office    | Cardiology |   Tonia Wilson,    |                   |
|    | Visit     |            | ARNP  [address]   |                   |
 
|        |           |            | St  [place], [place]  |                   |
|        |           |            | [address]  [phone]  |                   |
|        |           |            |  [phone] (Fax)  |                   |
+--------+-----------+------------+----------------------+-------------------+
| / | Off-Site  | Nephrology |   Marzena Moser  |                   |
|    | Visit     |            | M,   [address] [place] [address]      |                   |
|        |           |            | [address]      |                   |
|        |           |            | [place]      |                   |
|        |           |            | [address]  [phone]  |                   |
|        |           |            |  [phone] (Fax)  |                   |
+--------+-----------+------------+----------------------+-------------------+
 documented as of this encounter
 
 Procedures
 
 
+----------------------+--------+------------+----------------------+----------------------+
| Procedure Name       | Priori | Date/Time  | Associated Diagnosis | Comments             |
|                      | ty     |            |                      |                      |
+----------------------+--------+------------+----------------------+----------------------+
| URINALYSIS W/CULTURE | Routin | 2017 |                      |   Results for this   |
|  IF INDICATED        | e      |            |                      | procedure are in the |
|                      |        |            |                      |  results section.    |
+----------------------+--------+------------+----------------------+----------------------+
 documented in this encounter
 
 Results
 Urinalysis w/Culture if Indicated (2017)
 
+-----------------+
| Specimen        |
+-----------------+
| Urine specimen  |
| (specimen)      |
+-----------------+
 
 
 
+----------------------------------------------------------------------+--------------+
| Narrative                                                            | Performed At |
+----------------------------------------------------------------------+--------------+
|   2/3/17- Over 100,00 CFU/mL lactose .     17- Lactose  |              |
|  identified as Escherichia coli                             |              |
+----------------------------------------------------------------------+--------------+
 
 
 
+------------------+------------------+--------+----------------+
| Organism         | Antibiotic       | Method | Susceptibility |
+------------------+------------------+--------+----------------+
| Escherichia coli | Ampicillin       |        |   Sensitive    |
+------------------+------------------+--------+----------------+
| Escherichia coli | Amoxicillin +    |        |   Sensitive    |
|                  | Clavulanate      |        |                |
+------------------+------------------+--------+----------------+
| Escherichia coli | Piperacillin +   |        |   Sensitive    |
|                  | Tazobactam       |        |                |
+------------------+------------------+--------+----------------+
| Escherichia coli | Cefazolin        |        |   Sensitive    |
+------------------+------------------+--------+----------------+
 
| Escherichia coli | Cefepime         |        |   Sensitive    |
+------------------+------------------+--------+----------------+
| Escherichia coli | Aztreonam        |        |   Sensitive    |
+------------------+------------------+--------+----------------+
| Escherichia coli | Ertapenem        |        |   Sensitive    |
+------------------+------------------+--------+----------------+
| Escherichia coli | Imipenem         |        |   Sensitive    |
+------------------+------------------+--------+----------------+
| Escherichia coli | Meropenem        |        |   Sensitive    |
+------------------+------------------+--------+----------------+
| Escherichia coli | Gentamicin       |        |   Sensitive    |
+------------------+------------------+--------+----------------+
| Escherichia coli | Ciprofloxacin    |        |   Sensitive    |
+------------------+------------------+--------+----------------+
| Escherichia coli | Levofloxacin     |        |   Sensitive    |
+------------------+------------------+--------+----------------+
| Escherichia coli | Tetracycline     |        |   Sensitive    |
+------------------+------------------+--------+----------------+
| Escherichia coli | Nitrofurantoin   |        |   Sensitive    |
+------------------+------------------+--------+----------------+
| Escherichia coli | Trimethoprim +   |        |   Sensitive    |
|                  | Sulfamethoxazole |        |                |
+------------------+------------------+--------+----------------+
 documented in this encounter
 
 Visit Diagnoses
 Not on filedocumented in this encounter

## 2020-01-08 NOTE — XMS
Encounter Summary
  Created on: 2020
 
 Viviana Ricci
 External Reference #: [id number]
 : 47
 Sex: Female
 
 Demographics
 
 
+-----------------------+----------------------+
| Address               | [address]      |
|                       | [place]  [address] |
+-----------------------+----------------------+
| Home Phone            | [phone]      |
+-----------------------+----------------------+
| Preferred Language    | Unknown              |
+-----------------------+----------------------+
| Marital Status        | Single               |
+-----------------------+----------------------+
| Nondenominational Affiliation | 1009                 |
+-----------------------+----------------------+
| Race                  | Unknown              |
+-----------------------+----------------------+
| Ethnic Group          | Unknown              |
+-----------------------+----------------------+
 
 
 Author
 
 
+--------------+--------------------------------------------+
| Author       | [facility]  |
|              | and [place]                                |
+--------------+--------------------------------------------+
| Organization | [facility]  |
|              | and [place]                                |
+--------------+--------------------------------------------+
| Address      | Unknown                                    |
+--------------+--------------------------------------------+
| Phone        | Unavailable                                |
+--------------+--------------------------------------------+
 
 
 
 Support
 
 
+----------------+--------------+---------------------+-----------------+
| Name           | Relationship | Address             | Phone           |
+----------------+--------------+---------------------+-----------------+
| Ada/Ed Radhames | ECON         | [address]              | [phone] |
|                |              | [place]  |                 |
|                |              |  [address]              |                 |
+----------------+--------------+---------------------+-----------------+
 
 
 
 
 Care Team Providers
 
 
+-----------------------+------+-------------+
| Care Team Member Name | Role | Phone       |
+-----------------------+------+-------------+
 PCP  | Unavailable |
+-----------------------+------+-------------+
 
 
 
 Reason for Visit
 
 
+-------------------+----------+
| Reason            | Comments |
+-------------------+----------+
| Medication Refill |          |
+-------------------+----------+
 
 
 
 Encounter Details
 
 
+--------+--------+---------------------+----------------------+-------------------+
| Date   | Type   | Department          | Care Team            | Description       |
+--------+--------+---------------------+----------------------+-------------------+
| / | Refill |   [place]         |   Marzena Moser  | Medication Refill |
|    |        | NEPHROLOGY  [address]   | M, DO  [address]      |                   |
|        |        | [address]   | [address]      |                   |
|        |        | [place], [place]     | [place], [place]      |                   |
|        |        | [address]          | [address]  [phone]  |                   |
|        |        | [phone]        |  [phone] (Fax)  |                   |
+--------+--------+---------------------+----------------------+-------------------+
 
 
 
 Social History
 
 
+--------------+-------+-----------+--------+------+
| Tobacco Use  | Types | Packs/Day | Years  | Date |
|              |       |           | Used   |      |
+--------------+-------+-----------+--------+------+
| Never Smoker |       |           |        |      |
+--------------+-------+-----------+--------+------+
 
 
 
+---------------------+---+---+---+
| Smokeless Tobacco:  |   |   |   |
| Never Used          |   |   |   |
+---------------------+---+---+---+
 
 
 
+---------------------------------------------------------------+
| Comments: some second hand smoke exposure, but fairly minimal |
 
+---------------------------------------------------------------+
 
 
 
+-------------+-------------+---------+----------+
| Alcohol Use | Drinks/Week | oz/Week | Comments |
+-------------+-------------+---------+----------+
| No          |             |         |          |
+-------------+-------------+---------+----------+
 
 
 
+------------------+---------------+
| Sex Assigned at  | Date Recorded |
| Birth            |               |
+------------------+---------------+
| Not on file      |               |
+------------------+---------------+
 
 
 
+----------------+-------------+-------------+
| Job Start Date | Occupation  | Industry    |
+----------------+-------------+-------------+
| Not on file    | Not on file | Not on file |
+----------------+-------------+-------------+
 
 
 
+----------------+--------------+------------+
| Travel History | Travel Start | Travel End |
+----------------+--------------+------------+
 
 
 
+-------------------------------------+
| No recent travel history available. |
+-------------------------------------+
 documented as of this encounter
 
 Plan of Treatment
 
 
+--------+-----------+------------+----------------------+-------------------+
| Date   | Type      | Specialty  | Care Team            | Description       |
+--------+-----------+------------+----------------------+-------------------+
| / | Office    | Cardiology |   HellalfredoTonia,    |                   |
|    | Visit     |            | ARNP  [address]   |                   |
|        |           |            | St  [place], [place]  |                   |
|        |           |            | [address]  [phone]  |                   |
|        |           |            |  [phone] (Fax)  |                   |
+--------+-----------+------------+----------------------+-------------------+
| / | Hospital  | Radiology  |   Popeye Townsend, |                   |
|    | Encounter |            |  MD  [address] |                   |
|        |           |            |  St.  [place],   |                   |
|        |           |            | [place] [address]             |                   |
|        |           |            | [phone]         |                   |
|        |           |            | [phone] (Fax)   |                   |
+--------+-----------+------------+----------------------+-------------------+
| / | Surgery   | Radiology  |   Popeye Townsend, | CV EP PPM SYSTEM  |
 
|    |           |            |  MD  [address] | IMPLANT           |
|        |           |            |  St.  [place],   |                   |
|        |           |            | [place] [address]             |                   |
|        |           |            | [phone]         |                   |
|        |           |            | [phone] (Fax)   |                   |
+--------+-----------+------------+----------------------+-------------------+
| 02/10/ | Clinical  | Cardiology |                      |                   |
|    | Support   |            |                      |                   |
+--------+-----------+------------+----------------------+-------------------+
| / | Office    | Cardiology |   Tonia Wilson,    |                   |
|    | Visit     |            | ARNP  [address]   |                   |
|        |           |            | [place]  |                   |
|        |           |            | [address]  [phone]  |                   |
|        |           |            |  [phone] (Fax)  |                   |
+--------+-----------+------------+----------------------+-------------------+
| / | Off-Site  | Nephrology |   Marzena Moser  |                   |
|    | Visit     |            | DO ETIENNE  [address] [place] [address]      |                   |
|        |           |            | [address]      |                   |
|        |           |            | [place]      |                   |
|        |           |            | [address]  [phone]  |                   |
|        |           |            |  [phone] (Fax)  |                   |
+--------+-----------+------------+----------------------+-------------------+
 documented as of this encounter
 
 Visit Diagnoses
 Not on filedocumented in this encounter

## 2020-01-08 NOTE — XMS
Encounter Summary
  Created on: 2020
 
 Viviana Ricci
 External Reference #: [id number]
 : 47
 Sex: Female
 
 Demographics
 
 
+-----------------------+----------------------+
| Address               | [address]      |
|                       | [place]  [address] |
+-----------------------+----------------------+
| Home Phone            | [phone]      |
+-----------------------+----------------------+
| Preferred Language    | Unknown              |
+-----------------------+----------------------+
| Marital Status        | Single               |
+-----------------------+----------------------+
| Taoist Affiliation | [id number]                 |
+-----------------------+----------------------+
| Race                  | Unknown              |
+-----------------------+----------------------+
| Ethnic Group          | Unknown              |
+-----------------------+----------------------+
 
 
 Author
 
 
+--------------+--------------------------------------------+
| Author       | [facility]  |
|              | and [place]                                |
+--------------+--------------------------------------------+
| Organization | [facility]  |
|              | and [place]                                |
+--------------+--------------------------------------------+
| Address      | Unknown                                    |
+--------------+--------------------------------------------+
| Phone        | Unavailable                                |
+--------------+--------------------------------------------+
 
 
 
 Support
 
 
+----------------+--------------+---------------------+-----------------+
| Name           | Relationship | Address             | Phone           |
+----------------+--------------+---------------------+-----------------+
| Ada/Ed Radhames | ECON         | [place]              | [phone] |
|                |              | [place]  |                 |
|                |              |  [address]              |                 |
+----------------+--------------+---------------------+-----------------+
 
 
 
 
 Care Team Providers
 
 
+-----------------------+------+-------------+
| Care Team Member Name | Role | Phone       |
+-----------------------+------+-------------+
 PCP  | Unavailable |
+-----------------------+------+-------------+
 
 
 
 Reason for Visit
 
 
+-------------------+----------+
| Reason            | Comments |
+-------------------+----------+
| Medication Refill |          |
+-------------------+----------+
 
 
 
 Encounter Details
 
 
+--------+--------+---------------------+----------------------+-------------------+
| Date   | Type   | Department          | Care Team            | Description       |
+--------+--------+---------------------+----------------------+-------------------+
| / | Refill |   [facility]         |   Marzena Moser  | Medication Refill |
|    |        | NEPHROLOGY  [address]   | M, DO  [address]      |                   |
|        |        | [address]   | [address]      |                   |
|        |        | [place], [place]     | [place], [place]      |                   |
|        |        | [address]          | [address]  [phone]  |                   |
|        |        | [phone]        |  [phone] (Fax)  |                   |
+--------+--------+---------------------+----------------------+-------------------+
 
 
 
 Social History
 
 
+--------------+-------+-----------+--------+------+
| Tobacco Use  | Types | Packs/Day | Years  | Date |
|              |       |           | Used   |      |
+--------------+-------+-----------+--------+------+
| Never Smoker |       |           |        |      |
+--------------+-------+-----------+--------+------+
 
 
 
+---------------------+---+---+---+
| Smokeless Tobacco:  |   |   |   |
| Never Used          |   |   |   |
+---------------------+---+---+---+
 
 
 
+-------------+-------------+---------+----------+
| Alcohol Use | Drinks/Week | oz/Week | Comments |
 
+-------------+-------------+---------+----------+
| No          |             |         |          |
+-------------+-------------+---------+----------+
 
 
 
+------------------+---------------+
| Sex Assigned at  | Date Recorded |
| Birth            |               |
+------------------+---------------+
| Not on file      |               |
+------------------+---------------+
 
 
 
+----------------+-------------+-------------+
| Job Start Date | Occupation  | Industry    |
+----------------+-------------+-------------+
| Not on file    | Not on file | Not on file |
+----------------+-------------+-------------+
 
 
 
+----------------+--------------+------------+
| Travel History | Travel Start | Travel End |
+----------------+--------------+------------+
 
 
 
+-------------------------------------+
| No recent travel history available. |
+-------------------------------------+
 documented as of this encounter
 
 Plan of Treatment
 
 
+--------+-----------+------------+----------------------+-------------------+
| Date   | Type      | Specialty  | Care Team            | Description       |
+--------+-----------+------------+----------------------+-------------------+
| / | Office    | Cardiology |   Tonia Wilson,    |                   |
|    | Visit     |            | BELKIS  [address]   |                   |
|        |           |            | St  [place], [place]  |                   |
|        |           |            | [address] [phone]  |                   |
|        |           |            |  [phone] (Fax)  |                   |
+--------+-----------+------------+----------------------+-------------------+
| / | Hospital  | Radiology  |   Cary Himanshuraul, |                   |
|    | Encounter |            |  MD  [address] |                   |
|        |           |            |  St.  [place],   |                   |
|        |           |            | [place] [address]             |                   |
|        |           |            | [phone]         |                   |
|        |           |            | [phone] (Fax)   |                   |
+--------+-----------+------------+----------------------+-------------------+
| / | Surgery   | Radiology  |   Aimeechidi Yinpeeraul, | CV EP PPM SYSTEM  |
|    |           |            |  MD  [address] | IMPLANT           |
|        |           |            |  St.  [place],   |                   |
|        |           |            | [place] [address]             |                   |
|        |           |            | [phone]         |                   |
|        |           |            | [phone] (Fax)   |                   |
+--------+-----------+------------+----------------------+-------------------+
 
| 02/10/ | Clinical  | Cardiology |                      |                   |
|    | Support   |            |                      |                   |
+--------+-----------+------------+----------------------+-------------------+
| / | Office    | Cardiology |   Tonia Wilson,    |                   |
|    | Visit     |            | ARNP  [address]   |                   |
|        |           |            | St  [place], [place]  |                   |
|        |           |            | [address]  [phone]  |                   |
|        |           |            |  [phone] (Fax)  |                   |
+--------+-----------+------------+----------------------+-------------------+
| / | Off-Site  | Nephrology |   Marzena Moser  |                   |
|    | Visit     |            | DO ETIENNE  [address] [place] [address]      |                   |
|        |           |            | [address]      |                   |
|        |           |            | [place]      |                   |
|        |           |            | [address] [phone]  |                   |
|        |           |            |  [phone] (Fax)  |                   |
+--------+-----------+------------+----------------------+-------------------+
 documented as of this encounter
 
 Visit Diagnoses
 Not on filedocumented in this encounter

## 2020-01-08 NOTE — XMS
Encounter Summary
  Created on: 2020
 
 Viviana Ricci
 External Reference #: [id number]
 : 47
 Sex: Female
 
 Demographics
 
 
+-----------------------+----------------------+
| Address               | [address]      |
|                       | [place]  [address] |
+-----------------------+----------------------+
| Home Phone            | [phone]      |
+-----------------------+----------------------+
| Preferred Language    | Unknown              |
+-----------------------+----------------------+
| Marital Status        | Single               |
+-----------------------+----------------------+
| Rastafari Affiliation | [id number]                 |
+-----------------------+----------------------+
| Race                  | Unknown              |
+-----------------------+----------------------+
| Ethnic Group          | Unknown              |
+-----------------------+----------------------+
 
 
 Author
 
 
+--------------+--------------------------------------------+
| Author       | [facility]  |
|              | and [place]                                |
+--------------+--------------------------------------------+
| Organization | [facility]  |
|              | and [place]                                |
+--------------+--------------------------------------------+
| Address      | Unknown                                    |
+--------------+--------------------------------------------+
| Phone        | Unavailable                                |
+--------------+--------------------------------------------+
 
 
 
 Support
 
 
+----------------+--------------+---------------------+-----------------+
| Name           | Relationship | Address             | Phone           |
+----------------+--------------+---------------------+-----------------+
| Ada/Ed Radhames | ECON         | [address]              | [phone] |
|                |              | [place]  |                 |
|                |              |  [address]              |                 |
+----------------+--------------+---------------------+-----------------+
 
 
 
 
 Care Team Providers
 
 
+-----------------------+------+-------------+
| Care Team Member Name | Role | Phone       |
+-----------------------+------+-------------+
 PCP  | Unavailable |
+-----------------------+------+-------------+
 
 
 
 Encounter Details
 
 
+--------+-----------+----------------------+----------------------+-------------+
| Date   | Type      | Department           | Care Team            | Description |
+--------+-----------+----------------------+----------------------+-------------+
| / | [facility]  |   [facility] |   Marzena Moser  |             |
|    | Encounter |  MED CTR GENERIC OP  | M, DO  [address]      |             |
|        |           | CONV DEPT  [address]     | [address]      |             |
|        |           | [address]  [place], | [place], [place]      |             |
|        |           |  [place] [address]       | [address]  [phone]  |             |
|        |           | [phone]         |  [phone] (Fax)  |             |
+--------+-----------+----------------------+----------------------+-------------+
 
 
 
 Social History
 
 
+----------------+-------+-----------+--------+------+
| Tobacco Use    | Types | Packs/Day | Years  | Date |
|                |       |           | Used   |      |
+----------------+-------+-----------+--------+------+
| Never Assessed |       |           |        |      |
+----------------+-------+-----------+--------+------+
 
 
 
+------------------+---------------+
| Sex Assigned at  | Date Recorded |
| Birth            |               |
+------------------+---------------+
| Not on file      |               |
+------------------+---------------+
 
 
 
+----------------+-------------+-------------+
| Job Start Date | Occupation  | Industry    |
+----------------+-------------+-------------+
| Not on file    | Not on file | Not on file |
+----------------+-------------+-------------+
 
 
 
+----------------+--------------+------------+
| Travel History | Travel Start | Travel End |
+----------------+--------------+------------+
 
 
 
 
+-------------------------------------+
| No recent travel history available. |
+-------------------------------------+
 documented as of this encounter
 
 Plan of Treatment
 
 
+--------+-----------+------------+----------------------+-------------------+
| Date   | Type      | Specialty  | Care Team            | Description       |
+--------+-----------+------------+----------------------+-------------------+
| / | Office    | Cardiology |   Tonia Wilson,    |                   |
|    | Visit     |            | ARNJESSICA  [address]   |                   |
|        |           |            | St  [place] [facility], [place]  |                   |
|        |           |            | [address]  [phone]  |                   |
|        |           |            |  [phone] (Fax)  |                   |
+--------+-----------+------------+----------------------+-------------------+
| / | Hospital  | Radiology  |   Popeye Townsend, |                   |
|    | Encounter |            |  MD  [address] |                   |
|        |           |            |  [place],   |                   |
|        |           |            | [place] [address]             |                   |
|        |           |            | [phone]         |                   |
|        |           |            | [phone] (Fax)   |                   |
+--------+-----------+------------+----------------------+-------------------+
| / | Surgery   | Radiology  |   Popeye Townsend, | CV EP PPM SYSTEM  |
|    |           |            |  MD  [address] | IMPLANT           |
|        |           |            |  [place],   |                   |
|        |           |            | [place] [address]             |                   |
|        |           |            | [phone]         |                   |
|        |           |            | [phone] (Fax)   |                   |
+--------+-----------+------------+----------------------+-------------------+
| 02/10/ | Clinical  | Cardiology |                      |                   |
|    | Support   |            |                      |                   |
+--------+-----------+------------+----------------------+-------------------+
| / | Office    | Cardiology |   Tonia Wilson,    |                   |
|    | Visit     |            | BELKIS  [address]   |                   |
|        |           |            | [place]  |                   |
|        |           |            | [address]  [phone]  |                   |
|        |           |            |  [phone] (Fax)  |                   |
+--------+-----------+------------+----------------------+-------------------+
| / | Off-Site  | Nephrology |   Marzena Moser  |                   |
|    | Visit     |            | DO ETIENNE  [address] [place] [address]      |                   |
|        |           |            | [address]      |                   |
|        |           |            | [place]      |                   |
|        |           |            | [address] [phone]  |                   |
|        |           |            |  [phone] (Fax)  |                   |
+--------+-----------+------------+----------------------+-------------------+
 documented as of this encounter
 
 Visit Diagnoses
 Not on filedocumented in this encounter

## 2020-01-08 NOTE — XMS
Encounter Summary
  Created on: 2020
 
 Viviana Ricci
 External Reference #: [id number]
 : 47
 Sex: Female
 
 Demographics
 
 
+-----------------------+----------------------+
| Address               | [address]      |
|                       | [place]  [address] |
+-----------------------+----------------------+
| Home Phone            | [phone]      |
+-----------------------+----------------------+
| Preferred Language    | Unknown              |
+-----------------------+----------------------+
| Marital Status        | Single               |
+-----------------------+----------------------+
| Faith Affiliation | [id number]                 |
+-----------------------+----------------------+
| Race                  | Unknown              |
+-----------------------+----------------------+
| Ethnic Group          | Unknown              |
+-----------------------+----------------------+
 
 
 Author
 
 
+--------------+--------------------------------------------+
| Author       | [facility]  |
|              | and [facility]                                |
+--------------+--------------------------------------------+
| Organization | [facility]  |
|              | and [facility]                                |
+--------------+--------------------------------------------+
| Address      | Unknown                                    |
+--------------+--------------------------------------------+
| Phone        | Unavailable                                |
+--------------+--------------------------------------------+
 
 
 
 Support
 
 
+----------------+--------------+---------------------+-----------------+
| Name           | Relationship | Address             | Phone           |
+----------------+--------------+---------------------+-----------------+
| Ada/Ed Radhames | ECON         | [address]              | [phone] |
|                |              | [place]  |                 |
|                |              |  [id number]              |                 |
+----------------+--------------+---------------------+-----------------+
 
 
 
 
 Care Team Providers
 
 
+-----------------------+------+-------------+
| Care Team Member Name | Role | Phone       |
+-----------------------+------+-------------+
 PCP  | Unavailable |
+-----------------------+------+-------------+
 
 
 
 Reason for Visit
 
 
+--------+-----------------------------+
| Reason | Comments                    |
+--------+-----------------------------+
| Other  | medication dose adjustment  |
+--------+-----------------------------+
 
 
 
 Encounter Details
 
 
+--------+-----------+----------------------+----------------------+--------------------+
| Date   | Type      | Department           | Care Team            | Description        |
+--------+-----------+----------------------+----------------------+--------------------+
| / | Telephone |   [facility]  |   Marzena Moser  | Other (medication  |
|    |           | Care  [address]  | M, DO  [address]      | dose adjustment )  |
|        |           | [address],   | [address]      |                    |
|        |           | [place] [address]        | [place]      |                    |
|        |           | [phone]         | [address] [phone]  |                    |
|        |           |                      |  [phone] (Fax)  |                    |
+--------+-----------+----------------------+----------------------+--------------------+
 
 
 
 Social History
 
 
+--------------+-------+-----------+--------+------+
| Tobacco Use  | Types | Packs/Day | Years  | Date |
|              |       |           | Used   |      |
+--------------+-------+-----------+--------+------+
| Never Smoker |       |           |        |      |
+--------------+-------+-----------+--------+------+
 
 
 
+---------------------+---+---+---+
| Smokeless Tobacco:  |   |   |   |
| Never Used          |   |   |   |
+---------------------+---+---+---+
 
 
 
+-------------+-------------+---------+----------+
| Alcohol Use | Drinks/Week | oz/Week | Comments |
 
+-------------+-------------+---------+----------+
| No          |             |         |          |
+-------------+-------------+---------+----------+
 
 
 
+------------------+---------------+
| Sex Assigned at  | Date Recorded |
| Birth            |               |
+------------------+---------------+
| Not on file      |               |
+------------------+---------------+
 
 
 
+----------------+-------------+-------------+
| Job Start Date | Occupation  | Industry    |
+----------------+-------------+-------------+
| Not on file    | Not on file | Not on file |
+----------------+-------------+-------------+
 
 
 
+----------------+--------------+------------+
| Travel History | Travel Start | Travel End |
+----------------+--------------+------------+
 
 
 
+-------------------------------------+
| No recent travel history available. |
+-------------------------------------+
 documented as of this encounter
 
 Plan of Treatment
 
 
+--------+-----------+------------+----------------------+-------------------+
| Date   | Type      | Specialty  | Care Team            | Description       |
+--------+-----------+------------+----------------------+-------------------+
| / | Office    | Cardiology |   Tonia Wilson,    |                   |
|    | Visit     |            | ARNP  [address]   |                   |
|        |           |            | [place]  |                   |
|        |           |            | [address]  [phone]  |                   |
|        |           |            |  [phone] (Fax)  |                   |
+--------+-----------+------------+----------------------+-------------------+
| / | Hospital  | Radiology  |   Popeye Townsend, |                   |
2020   | Encounter |            |  MD  [address] |                   |
|        |           |            |  St.  [place],   |                   |
|        |           |            | [place] [address]             |                   |
|        |           |            | [phone]         |                   |
|        |           |            | [phone] (Fax)   |                   |
+--------+-----------+------------+----------------------+-------------------+
| / | Surgery   | Radiology  |   Popeye Townsend, | CV EP PPM SYSTEM  |
|    |           |            |  MD  [address] | IMPLANT           |
|        |           |            |  St.  [place],   |                   |
|        |           |            | [place] [address]             |                   |
|        |           |            | [phone]         |                   |
|        |           |            | [phone] (Fax)   |                   |
+--------+-----------+------------+----------------------+-------------------+
 
| 02/10/ | Clinical  | Cardiology |                      |                   |
2020   | Support   |            |                      |                   |
+--------+-----------+------------+----------------------+-------------------+
| / | Office    | Cardiology |   Tonia Wilson,    |                   |
|    | Visit     |            | BEL[place]  [address]   |                   |
|        |           |            | St  [place]  |                   |
|        |           |            | [address]  [phone]  |                   |
|        |           |            |  [phone] (Fax)  |                   |
+--------+-----------+------------+----------------------+-------------------+
| / | Off-Site  | Nephrology |   Marzena Moser  |                   |
|    | Visit     |            | DO ETIENNE  [address]      |                   |
|        |           |            | [address]      |                   |
|        |           |            | [place]      |                   |
|        |           |            | [address]  [phone]  |                   |
|        |           |            |  [phone] (Fax)  |                   |
+--------+-----------+------------+----------------------+-------------------+
 documented as of this encounter
 
 Visit Diagnoses
 Not on filedocumented in this encounter

## 2020-01-08 NOTE — XMS
Encounter Summary
  Created on: 2020
 
 Viviana Ricci
 External Reference #: [id number]
 : 47
 Sex: Female
 
 Demographics
 
 
+-----------------------+----------------------+
| Address               | [address]      |
|                       | [place]  [address] |
+-----------------------+----------------------+
| Home Phone            | [phone]      |
+-----------------------+----------------------+
| Preferred Language    | Unknown              |
+-----------------------+----------------------+
| Marital Status        | Single               |
+-----------------------+----------------------+
| Spiritism Affiliation | [id number]                 |
+-----------------------+----------------------+
| Race                  | Unknown              |
+-----------------------+----------------------+
| Ethnic Group          | Unknown              |
+-----------------------+----------------------+
 
 
 Author
 
 
+--------------+--------------------------------------------+
| Author       | [facility]  |
|              | and [place]                                |
+--------------+--------------------------------------------+
| Organization | [facility]  |
|              | and [place]                                |
+--------------+--------------------------------------------+
| Address      | Unknown                                    |
+--------------+--------------------------------------------+
| Phone        | Unavailable                                |
+--------------+--------------------------------------------+
 
 
 
 Support
 
 
+----------------+--------------+---------------------+-----------------+
| Name           | Relationship | Address             | Phone           |
+----------------+--------------+---------------------+-----------------+
| Ada/Ed Radhames | ECON         | [address]              | [phone] |
|                |              | [place]  |                 |
|                |              |  [id number]              |                 |
+----------------+--------------+---------------------+-----------------+
 
 
 
 
 Care Team Providers
 
 
+------------------------+------+-----------------+
| Care Team Member Name  | Role | Phone           |
+------------------------+------+-----------------+
| Marzena Moser DO | PCP  | [phone] |
+------------------------+------+-----------------+
 
 
 
 Reason for Visit
 Evaluate & Treat (Routine)
 
+--------+--------+------------+--------------+--------------+---------------+
| Status | Reason | Specialty  | Diagnoses /  | Referred By  | Referred To   |
|        |        |            | Procedures   | Contact      | Contact       |
+--------+--------+------------+--------------+--------------+---------------+
| Closed |        | Nephrology |   Diagnoses  |   Stroemel,  |   Stroemel,   |
|        |        |            |  Unspecified | Marzena M,   | Marzena M, DO |
|        |        |            |              | DO  [address] |   [address]    |
|        |        |            | complication |  [place] [address] [place] [address]   |
|        |        |            |  of kidney   |  [address]  [place]  | [address]  [place]    |
|        |        |            | transplant   | [place]    | [place]     |
|        |        |            | FSGS (focal  | [address]        | [address]  Phone: |
|        |        |            | segmental    | Phone:       |  [phone] |
|        |        |            | glomeruloscl | [phone] |   Fax:        |
|        |        |            | erosis)      |   Fax:       | [phone]  |
|        |        |            | Hypertension | [phone] |               |
|        |        |            | , essential  |              |               |
|        |        |            |  Procedures  |              |               |
|        |        |            |  [facility]   |              |               |
|        |        |            | OUTPATIENT   |              |               |
|        |        |            | VISIT 25     |              |               |
|        |        |            | MINUTES      |              |               |
+--------+--------+------------+--------------+--------------+---------------+
 
 
 
 
 Encounter Details
 
 
+--------+-----------+---------------------+----------------------+----------------------+
| Date   | Type      | Department          | Care Team            | Description          |
+--------+-----------+---------------------+----------------------+----------------------+
| 10/01/ | Off-Site  |   [facility]         |   Marzena Moser  | FSGS (focal          |
| 2018   | Visit     | NEPHROLOGY  [address]   | M, DO  [address]      | segmental            |
|        |           | [address]   | [address]      | glomerulosclerosis)  |
|        |           | [place], [place]     | [place]      | (Primary Dx); Kidney |
|        |           | [address]          | [address]  [phone]  |  replaced by         |
|        |           | [phone]        |  [phone] (Fax)  | transplant; Type 2   |
|        |           |                     |                      | DM with CKD stage 2  |
|        |           |                     |                      | and hypertension     |
|        |           |                     |                      | (HCC); Essential     |
|        |           |                     |                      | hypertension,        |
|        |           |                     |                      | malignant            |
+--------+-----------+---------------------+----------------------+----------------------+
 
 
 
 
 Social History
 
 
+--------------+-------+-----------+--------+------+
| Tobacco Use  | Types | Packs/Day | Years  | Date |
|              |       |           | Used   |      |
+--------------+-------+-----------+--------+------+
| Never Smoker |       |           |        |      |
+--------------+-------+-----------+--------+------+
 
 
 
+---------------------+---+---+---+
| Smokeless Tobacco:  |   |   |   |
| Never Used          |   |   |   |
+---------------------+---+---+---+
 
 
 
+---------------------------------------------------------------+
| Comments: some second hand smoke exposure, but fairly minimal |
+---------------------------------------------------------------+
 
 
 
+-------------+-------------+---------+----------+
| Alcohol Use | Drinks/Week | oz/Week | Comments |
+-------------+-------------+---------+----------+
| No          |             |         |          |
+-------------+-------------+---------+----------+
 
 
 
+------------------+---------------+
| Sex Assigned at  | Date Recorded |
| Birth            |               |
+------------------+---------------+
| Not on file      |               |
+------------------+---------------+
 
 
 
+----------------+-------------+-------------+
| Job Start Date | Occupation  | Industry    |
+----------------+-------------+-------------+
| Not on file    | Not on file | Not on file |
+----------------+-------------+-------------+
 
 
 
+----------------+--------------+------------+
| Travel History | Travel Start | Travel End |
+----------------+--------------+------------+
 
 
 
+-------------------------------------+
| No recent travel history available. |
 
+-------------------------------------+
 documented as of this encounter
 
 Last Filed Vital Signs
 
 
+-------------------+---------------------+----------------------+----------+
| Vital Sign        | Reading             | Time Taken           | Comments |
+-------------------+---------------------+----------------------+----------+
| Blood Pressure    | 150/86              | 10/01/2018  2:28 PM  |          |
|                   |                     | PDT                  |          |
+-------------------+---------------------+----------------------+----------+
| Pulse             | -                   | -                    |          |
+-------------------+---------------------+----------------------+----------+
| Temperature       | 35.9   C (96.6   F) | 10/01/2018  2:28 PM  |          |
|                   |                     | PDT                  |          |
+-------------------+---------------------+----------------------+----------+
| Respiratory Rate  | -                   | -                    |          |
+-------------------+---------------------+----------------------+----------+
| Oxygen Saturation | -                   | -                    |          |
+-------------------+---------------------+----------------------+----------+
| Inhaled Oxygen    | -                   | -                    |          |
| Concentration     |                     |                      |          |
+-------------------+---------------------+----------------------+----------+
| Weight            | 122.5 kg (270 lb)   | 10/01/2018  2:28 PM  |          |
|                   |                     | PDT                  |          |
+-------------------+---------------------+----------------------+----------+
| Height            | -                   | -                    |          |
+-------------------+---------------------+----------------------+----------+
| Body Mass Index   | 43.58               | 2018  2:31 PM  |          |
|                   |                     | PDT                  |          |
+-------------------+---------------------+----------------------+----------+
 documented in this encounter
 
 Progress Marzena Tamayo DO - 10/01/2018  2:00 PM PDTFormatting of this note might be different
 from the original.
 Subjective:   NEPHROLOGY
  
  
 Patient ID: Viviana Ricci is a 72 y.o. female.
 
 HPI:
 Followup for this 72 YOWF  s/p a renal allograft, 05, at [facility] with remote allograft dy
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
idemia, SHPTH,  previous low back pain, morbid obesity/JACK, chronic pulmonary  hypertension,
 historically related to centripetal obesity, and  CAD, s/p CABG x3 vessels,. 
 
 She has historically been on PPI's for some time.  She states that she is frustrated with h
er weight, and eating, and refuses to weigh, but gives me her home weight.
 She denies SOB , edema, graft tenderness, or dysuria.  She states that had moderate edema l
ast month, and was diuresed with lasix, PO, by her Dr. Andres Avina, her Pulmonologist.
 
  
 
 MEDS:
 
 Prograf 1.5 mg, AM and 1 mg, PM.
 Mycophenolate 250 mg, BID.
 Prednisone 5 mg, daily.
 
 Outpatient Prescriptions Marked as Taking for the 10/1/18 encounter (Off-Site Visit) with ETIENNE Moser, DO 
 Medication Sig Dispense Refill 
   allopurinol (ZYLOPRIM) 100 mg tablet take 1 tablet by mouth once daily 30 tablet 11 
   aspirin 81 mg EC tablet Take 81 mg by mouth Daily.   
   B-D INS SYRINGE 0.5CC/31GX5/16 31G X 5/16" 0.5 ML MISC USE BEFORE MEALS AS DIRECTED 1 e
ach 11 
   cholecalciferol (VITAMIN D-3) 2000 UNITS TABS Take 5,000 Units by mouth Every other day
.   
   fludrocortisone (FLORINEF) 0.1 mg tablet take 1 tablet by mouth every other day 90 tabl
et 4 
   furosemide (LASIX) 40 mg tablet Take 1 tablet by mouth Daily as needed. 30 tablet 11 
   glucose blood VI test strips (ONE TOUCH ULTRA TEST) strip Check blood sugar before each
 meal and as directed 100 each 12 
   insulin lispro (HUMALOG) 100 units/mL injection (vial) inject subcutaneously BEFORE CHIKA
LS ACCORDING TO SLIDING SCALE Max dose 20 units daily 10 vial 11 
   LANTUS 100 UNIT/ML injection (vial) inject 20 units subcutaneously every morning 10 via
l 11 
   levothyroxine (SYNTHROID) 50 mcg tablet take 1 tablet by mouth once daily 30 tablet 11 
   lisinopril (PRINIVIL,ZESTRIL) 30 MG tablet take 1 tablet by mouth once daily 90 tablet 
3 
   loperamide (ANTI-DIARRHEAL) 2 mg capsule Take 1 capsule by mouth 4 times daily as neede
d. 60 capsule 5 
   losartan (COZAAR) 50 mg tablet take 1 tablet by mouth once daily 90 tablet 3 
   magnesium oxide (MAG-OX) 400 mg tablet take 1 tablet by mouth twice a day 60 tablet 11 
   metoprolol tartrate (LOPRESSOR) 25 mg tablet take 1 tablet by mouth twice a day 60 tabl
et 11 
   omeprazole (PRILOSEC) 20 mg capsule Take 20 mg by mouth every morning (before breakfast
).   
   Prenatal Vit-Fe Fumarate-FA (PNV PRENATAL PLUS MULTIVITAMIN) 27-1 MG TABS take 1 tablet
 by mouth once daily. 30 tablet 11 
   Respiratory Therapy Supplies MISC Continue nocturnal O2 at 2 l/m through CPAP for lifet
bart. Dx: JACK G47.33 Please provide new nasal mask for CPAP and any other needed replacement 
supplies. 1 each 0 
   Respiratory Therapy Supplies MISC Decrease CPAP to 12-18 cmH2O Diagnosis Code(s)327.23.
 Please send order to [facility]. 1 each 0 
   rosuvastatin (CRESTOR) 20 mg tablet take 1 tablet by mouth NIGHTLY 30 tablet 11 
   SENSIPAR 30 MG tablet take 1 tablet by mouth once daily 30 tablet 11 
 
 Review of Systems
 
 Objective:    /86  | Temp 35.9 C (96.6 F)  | Wt 122.5 kg (270 lb)  | BMI 43.58 kg
/m 
  
 
 Physical Exam 
 HEENT: No thrush.
 Heart:  Regular rate and rhythm with no S3, S4, murmur or rub.
 Lungs:  CTA bilaterally.  No rales or wheezes.
 Abdomen:  soft, obese, the renal allograft in the RLQ is nontender,  NABS.
 Extremities: 1+ edema,  clubbing, cyanosis, no foot ulcers.
 (+) There is a 6 x 8 cm area of ecchymosis over Right wrist, not tender. Full ROM at Right 
wrist.
 
 Lab Results 
 Component Value Date 
  NAEX 146 2018 
  KEX 4.5 2018 
  CLEX 109 2018 
  CO2EX 21 2018 
 
  BUNEX 45 2018 
  CREEX 1.35 2018 
  EGFREX 39 2018 
  GLUEX 98 2018 
  CAEX 10.4 2018 
  PHOSEX 3.1 2018 
  PTHEX 193.0 (A) 2016 
  XNU3OSH 7.1 2018 
 
 Lab Results 
 Component Value Date 
  WBCEX 6.1 2018 
  HGBEX 13.9 2018 
  HCTEX 41.8 2018 
  PLTEX 143 2018 
 
 Lab Results 
 Component Value Date 
  PROTEX 1.3 (A) 2018 
  
 
 Lab Results 
 Component Value Date 
  UAEX negative 2018 
  UAEX normal 2018 
  UAEX negative 2018 
  UAEX 5 2018 
  UAEX normal 2018 
  UAEX 2 2018 
  UAEX 1.012 2018 
  UAEX trace 2018 
 
 Assessment: 
 
 1.  Renal Allograft, 05 -- her tacro. level is trending upward.  The Scr is creeping u
pward.
 2.  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
ft.
 3.  Type 2 DM, requiring insuline--  excellent control.
 4.  Hyperlipidemia--on statin Rx
 5.  Hypertension-- fairly good control.
 6.  SHPTH-- stable.
 7.  Morbid Obesity/JACK/Pulmonary hypertension-- she does not appear motivated at lifestyle 
modification?
 8.  CAD, s/p CABG  3 vessels, --stable on ASA, metoprolol, atorvastatin.
 9.  Type IV RTA--stable.
 10. Chronic Low Back pain--in remission.
 11.   chronic DJD, left knee--  same. 
 
 Plan: 
 
 1.  I asked Brandy to repeat her trough Tacro. level in the next 3 days. If still > 7.0 ng/ml
, will adjust the dose.
 2.  I encouraged her that she does appear to have regained good diabetic control.
 3.  I reviewed with her some measures about food choices and portion control, to assist wit
h her obesity. My gut feeling is that she struggles with chronic
 endogenous depression, which contributes to her eating disorder?
 4.  Lastly, she has no GI symptoms, therefore, she agreed to DC her PPI , omeprazole , to d
ecrease her risk of C. diff.
 5.  Will review her next tacro. level when available.  Additionally, I advised her continue
 
 lab draws Q 3 mo. after that.  Will plan to see her back in 6 months at the CKD Clinic at [facility], [place], [place].
 
 Electronically signed by: Marzean Moser DO on 10/1/2018 at 18:40
 
 CC:    Jose David Dalal M.D., [facility], [facility]
            Edgar Sanders MD, [facility], Orthopedics
            Andres TAMAYO
 
 Electronically signed by Marzena Moser DO at 10/01/2018  7:03 PM PDTdocumented in thi
s encounter
 
 Plan of Treatment
 
 
+--------+-----------+------------+----------------------+-------------------+
| Date   | Type      | Specialty  | Care Team            | Description       |
+--------+-----------+------------+----------------------+-------------------+
| / | Office    | Cardiology |   Tonia Wilson,    |                   |
|    | Visit     |            | ARNP  [address]   |                   |
|        |           |            | [address] [place]  |                   |
|        |           |            | [id number] [phone]  |                   |
|        |           |            |  [phone] (Fax)  |                   |
+--------+-----------+------------+----------------------+-------------------+
| / | Hospital  | Radiology  |   Popeye Townsend, |                   |
|    | Encounter |            |  MD  [address] |                   |
|        |           |            |  [place]   |                   |
|        |           |            | [place] [address]             |                   |
|        |           |            | [phone]         |                   |
|        |           |            | [phone] (Fax)   |                   |
+--------+-----------+------------+----------------------+-------------------+
| / | Surgery   | Radiology  |   Popeye Townsend, | CV EP PPM SYSTEM  |
|    |           |            |  MD  [address] | IMPLANT           |
|        |           |            |  [place],   |                   |
|        |           |            | [place] [address]             |                   |
|        |           |            | [phone]         |                   |
|        |           |            | [phone] (Fax)   |                   |
+--------+-----------+------------+----------------------+-------------------+
| 02/10/ | Clinical  | Cardiology |                      |                   |
|    | Support   |            |                      |                   |
+--------+-----------+------------+----------------------+-------------------+
| / | Office    | Cardiology |   Tonia Wilson,    |                   |
|    | Visit     |            | BELKIS  [address]   |                   |
|        |           |            | [facility] [place]  |                   |
|        |           |            | [address]  [phone]  |                   |
|        |           |            |  [phone] (Fax)  |                   |
+--------+-----------+------------+----------------------+-------------------+
| / | Off-Site  | Nephrology |   Marzena Moser  |                   |
2020   | Visit     |            | DO ETIENNE  [address]      |                   |
|        |           |            | [address]      |                   |
|        |           |            | [place]      |                   |
|        |           |            | [address]  [phone]  |                   |
|        |           |            |  [phone] (Fax)  |                   |
+--------+-----------+------------+----------------------+-------------------+
 documented as of this encounter
 
 Visit Diagnoses
 
 
 
+-----------------------------------------------------------------------------------------+
| Diagnosis                                                                               |
+-----------------------------------------------------------------------------------------+
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
| lesion of membranous glomerulonephritis                                                 |
+-----------------------------------------------------------------------------------------+
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
+-----------------------------------------------------------------------------------------+
|   Type 2 DM with CKD stage 2 and hypertension (HCC)                                     |
+-----------------------------------------------------------------------------------------+
|   Essential hypertension, malignant                                                     |
+-----------------------------------------------------------------------------------------+
 documented in this encounter

## 2020-01-08 NOTE — XMS
Encounter Summary
  Created on: 2020
 
 Viviana Ricci
 External Reference #: [id number]
 : 47
 Sex: Female
 
 Demographics
 
 
+-----------------------+----------------------+
| Address               | [address]      |
|                       | [place]  [address] |
+-----------------------+----------------------+
| Home Phone            | [phone]      |
+-----------------------+----------------------+
| Preferred Language    | Unknown              |
+-----------------------+----------------------+
| Marital Status        | Single               |
+-----------------------+----------------------+
| Pentec[employer]alism Affiliation | [id number]                 |
+-----------------------+----------------------+
| Race                  | Unknown              |
+-----------------------+----------------------+
| Ethnic Group          | Unknown              |
+-----------------------+----------------------+
 
 
 Author
 
 
+--------------+--------------------------------------------+
| Author       | [facility]  |
|              | and [facility]                                |
+--------------+--------------------------------------------+
| Organization | [facility]  |
|              | and [facility]                                |
+--------------+--------------------------------------------+
| Address      | Unknown                                    |
+--------------+--------------------------------------------+
| Phone        | Unavailable                                |
+--------------+--------------------------------------------+
 
 
 
 Support
 
 
+----------------+--------------+---------------------+-----------------+
| Name           | Relationship | Address             | Phone           |
+----------------+--------------+---------------------+-----------------+
| Ada/Ed Radhames | ECON         | [address]              | [phone] |
|                |              | [place]  |                 |
|                |              |  [id number]              |                 |
+----------------+--------------+---------------------+-----------------+
 
 
 
 
 Care Team Providers
 
 
+------------------------+------+-----------------+
| Care Team Member Name  | Role | Phone           |
+------------------------+------+-----------------+
| Marzena Moser DO | PCP  | [phone] |
+------------------------+------+-----------------+
 
 
 
 Encounter Details
 
 
+--------+-------------+---------------------+---------------------+----------------------+
| Date   | Type        | Department          | Care Team           | Description          |
+--------+-------------+---------------------+---------------------+----------------------+
| / | Orders Only |   [facility]         |   Juju Glass,  | Type 2 diabetes      |
| 2018   |             | NEPHROLOGY  [address]   | RN                  | mellitus with        |
|        |             | [address]   |                     | chronic kidney       |
|        |             | [place], [place]     |                     | disease, without     |
|        |             | [address]          |                     | long-term current    |
|        |             | [phone]        |                     | use of insulin,      |
|        |             |                     |                     | unspecified CKD      |
|        |             |                     |                     | stage (HCC) (Primary |
|        |             |                     |                     |  Dx); Kidney         |
|        |             |                     |                     | replaced by          |
|        |             |                     |                     | transplant           |
+--------+-------------+---------------------+---------------------+----------------------+
 
 
 
 Social History
 
 
+--------------+-------+-----------+--------+------+
| Tobacco Use  | Types | Packs/Day | Years  | Date |
|              |       |           | Used   |      |
+--------------+-------+-----------+--------+------+
| Never Smoker |       |           |        |      |
+--------------+-------+-----------+--------+------+
 
 
 
+---------------------+---+---+---+
| Smokeless Tobacco:  |   |   |   |
| Never Used          |   |   |   |
+---------------------+---+---+---+
 
 
 
+---------------------------------------------------------------+
| Comments: some second hand smoke exposure, but fairly minimal |
+---------------------------------------------------------------+
 
 
 
+-------------+-------------+---------+----------+
| Alcohol Use | Drinks/Week | oz/Week | Comments |
 
+-------------+-------------+---------+----------+
| No          |             |         |          |
+-------------+-------------+---------+----------+
 
 
 
+------------------+---------------+
| Sex Assigned at  | Date Recorded |
| Birth            |               |
+------------------+---------------+
| Not on file      |               |
+------------------+---------------+
 
 
 
+----------------+-------------+-------------+
| Job Start Date | Occupation  | Industry    |
+----------------+-------------+-------------+
| Not on file    | Not on file | Not on file |
+----------------+-------------+-------------+
 
 
 
+----------------+--------------+------------+
| Travel History | Travel Start | Travel End |
+----------------+--------------+------------+
 
 
 
+-------------------------------------+
| No recent travel history available. |
+-------------------------------------+
 documented as of this encounter
 
 Plan of Treatment
 
 
+--------+-----------+------------+----------------------+-------------------+
| Date   | Type      | Specialty  | Care Team            | Description       |
+--------+-----------+------------+----------------------+-------------------+
| / | Office    | Cardiology |   Tonia Wilson,    |                   |
| 2020   | Visit     |            | [facility]  [address]   |                   |
|        |           |            | St  [place], [place]  |                   |
|        |           |            | [address]  [phone]  |                   |
|        |           |            |  [phone] (Fax)  |                   |
+--------+-----------+------------+----------------------+-------------------+
| / | Hospital  | Radiology  |   Popeye Townsend, |                   |
|    | Encounter |            |  MD  [address] |                   |
|        |           |            |  [place],   |                   |
|        |           |            | [place] [address]             |                   |
|        |           |            | [phone]         |                   |
|        |           |            | [phone] (Fax)   |                   |
+--------+-----------+------------+----------------------+-------------------+
| / | Surgery   | Radiology  |   Popeye Townsend, | CV EP PPM SYSTEM  |
|    |           |            |  MD  [address] | IMPLANT           |
|        |           |            |  [place],   |                   |
|        |           |            | [place] [address]             |                   |
|        |           |            | [phone]         |                   |
|        |           |            | [phone] (Fax)   |                   |
+--------+-----------+------------+----------------------+-------------------+
 
| 02/10/ | Clinical  | Cardiology |                      |                   |
|    | Support   |            |                      |                   |
+--------+-----------+------------+----------------------+-------------------+
| / | Office    | Cardiology |   Tonia Wilson,    |                   |
|    | Visit     |            | [facility]  [address]   |                   |
|        |           |            |   [place]  |                   |
|        |           |            | [address]  [phone]  |                   |
|        |           |            |  [phone] (Fax)  |                   |
+--------+-----------+------------+----------------------+-------------------+
| / | Off-Site  | Nephrology |   Marzena Moser  |                   |
|    | Visit     |            | DO [facility]  [address] [place] [address]      |                   |
|        |           |            | [address]      |                   |
|        |           |            | [place]      |                   |
|        |           |            | [id number] [phone]  |                   |
|        |           |            |  [phone] (Fax)  |                   |
+--------+-----------+------------+----------------------+-------------------+
 documented as of this encounter
 
 Visit Diagnoses
 
 
+------------------------------------------------------------------------------------------+
| Diagnosis                                                                                |
+------------------------------------------------------------------------------------------+
|   Type 2 diabetes mellitus with chronic kidney disease, without long-term current use of |
|  insulin, unspecified CKD stage (HCC) - Primary                                          |
+------------------------------------------------------------------------------------------+
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
+------------------------------------------------------------------------------------------+
 documented in this encounter

## 2020-01-08 NOTE — XMS
Encounter Summary
  Created on: 2020
 
 Viviana Ricci
 External Reference #: [id number]
 : 47
 Sex: Female
 
 Demographics
 
 
+-----------------------+----------------------+
| Address               | [address]      |
|                       | [place]  [address] |
+-----------------------+----------------------+
| Home Phone            | [phone]      |
+-----------------------+----------------------+
| Preferred Language    | Unknown              |
+-----------------------+----------------------+
| Marital Status        | Single               |
+-----------------------+----------------------+
| Yarsanism Affiliation | [id number]                 |
+-----------------------+----------------------+
| Race                  | Unknown              |
+-----------------------+----------------------+
| Ethnic Group          | Unknown              |
+-----------------------+----------------------+
 
 
 Author
 
 
+--------------+--------------------------------------------+
| Author       | [facility]  |
|              | and [facility]                                |
+--------------+--------------------------------------------+
| Organization | [facility]  |
|              | and [facility]                                |
+--------------+--------------------------------------------+
| Address      | Unknown                                    |
+--------------+--------------------------------------------+
| Phone        | Unavailable                                |
+--------------+--------------------------------------------+
 
 
 
 Support
 
 
+----------------+--------------+---------------------+-----------------+
| Name           | Relationship | Address             | Phone           |
+----------------+--------------+---------------------+-----------------+
| Ada/Ed Radhames | ECON         | [address]              | [phone] |
|                |              | [place]  |                 |
|                |              |  [address]              |                 |
+----------------+--------------+---------------------+-----------------+
 
 
 
 
 Care Team Providers
 
 
+------------------------+------+-----------------+
| Care Team Member Name  | Role | Phone           |
+------------------------+------+-----------------+
| Marzena Moser DO | PCP  | [phone] |
+------------------------+------+-----------------+
 
 
 
 Encounter Details
 
 
+--------+----------+---------------------+----------------------+-------------+
| Date   | Type     | Department          | Care Team            | Description |
+--------+----------+---------------------+----------------------+-------------+
| / | Abstract |   [facility]         |   Marzena Moser  |             |
| 2019   |          | NEPHROLOGY  [address]   | DO [address]      |             |
|        |          | [address]   | [address]      |             |
|        |          | [place], [place]     | [place], [place]      |             |
|        |          | [address]          | [address]  [phone]  |             |
|        |          | [phone]        |  [phone] (Fax)  |             |
+--------+----------+---------------------+----------------------+-------------+
 
 
 
 Social History
 
 
+--------------+-------+-----------+--------+------+
| Tobacco Use  | Types | Packs/Day | Years  | Date |
|              |       |           | Used   |      |
+--------------+-------+-----------+--------+------+
| Never Smoker |       |           |        |      |
+--------------+-------+-----------+--------+------+
 
 
 
+---------------------+---+---+---+
| Smokeless Tobacco:  |   |   |   |
| Never Used          |   |   |   |
+---------------------+---+---+---+
 
 
 
+---------------------------------------------------------------+
| Comments: some second hand smoke exposure, but fairly minimal |
+---------------------------------------------------------------+
 
 
 
+-------------+-------------+---------+----------+
| Alcohol Use | Drinks/Week | oz/Week | Comments |
+-------------+-------------+---------+----------+
| No          |             |         |          |
+-------------+-------------+---------+----------+
 
 
 
 
+------------------+---------------+
| Sex Assigned at  | Date Recorded |
| Birth            |               |
+------------------+---------------+
| Not on file      |               |
+------------------+---------------+
 
 
 
+----------------+-------------+-------------+
| Job Start Date | Occupation  | Industry    |
+----------------+-------------+-------------+
| Not on file    | Not on file | Not on file |
+----------------+-------------+-------------+
 
 
 
+----------------+--------------+------------+
| Travel History | Travel Start | Travel End |
+----------------+--------------+------------+
 
 
 
+-------------------------------------+
| No recent travel history available. |
+-------------------------------------+
 documented as of this encounter
 
 Plan of Treatment
 
 
+--------+-----------+------------+----------------------+-------------------+
| Date   | Type      | Specialty  | Care Team            | Description       |
+--------+-----------+------------+----------------------+-------------------+
| / | Office    | Cardiology |   Tonia Wilson,    |                   |
|    | Visit     |            | ARNP  [address]   |                   |
|        |           |            | St  [place]  |                   |
|        |           |            | [address]  [phone]  |                   |
|        |           |            |  [phone] (Fax)  |                   |
+--------+-----------+------------+----------------------+-------------------+
| / | Hospital  | Radiology  |   Popeye Townsend, |                   |
|    | Encounter |            |  MD  [address] |                   |
|        |           |            |  St.  [place],   |                   |
|        |           |            | [place] [address]             |                   |
|        |           |            | [phone]         |                   |
|        |           |            | [phone] (Fax)   |                   |
+--------+-----------+------------+----------------------+-------------------+
| / | Surgery   | Radiology  |   Popeye Townsend, | CV EP PPM SYSTEM  |
|    |           |            |  MD  [address] | IMPLANT           |
|        |           |            |  St.  [place],   |                   |
|        |           |            | [place] [address]             |                   |
|        |           |            | [phone]         |                   |
|        |           |            | [phone] (Fax)   |                   |
+--------+-----------+------------+----------------------+-------------------+
| 02/10/ | Clinical  | Cardiology |                      |                   |
2020   | Support   |            |                      |                   |
+--------+-----------+------------+----------------------+-------------------+
| / | Office    | Cardiology |   Tonia Wilson,    |                   |
|    | Visit     |            | [facility]  [address]   |                   |
 
|        |           |            |   [place]  |                   |
|        |           |            | [address]  [phone]  |                   |
|        |           |            |  [phone] (Fax)  |                   |
+--------+-----------+------------+----------------------+-------------------+
| / | Off-Site  | Nephrology |   Marzena Moser  |                   |
2020   | Visit     |            | DO [facility]TIENNE  [address]      |                   |
|        |           |            | [address]      |                   |
|        |           |            | [place]      |                   |
|        |           |            | [address]  [phone]  |                   |
|        |           |            |  [phone] (Fax)  |                   |
+--------+-----------+------------+----------------------+-------------------+
 documented as of this encounter
 
 Procedures
 
 
+----------------------+--------+------------+----------------------+----------------------+
| Procedure Name       | Priori | Date/Time  | Associated Diagnosis | Comments             |
|                      | ty     |            |                      |                      |
+----------------------+--------+------------+----------------------+----------------------+
| EXTERNAL LAB: ALMA    | Routin | 2019 |                      |   Results for this   |
|                      | e      |            |                      | procedure are in the |
|                      |        |            |                      |  results section.    |
+----------------------+--------+------------+----------------------+----------------------+
| EXTERNAL LAB:        | Routin | 2019 |                      |   Results for this   |
| GLUCOSE              | e      |            |                      | procedure are in the |
|                      |        |            |                      |  results section.    |
+----------------------+--------+------------+----------------------+----------------------+
| EXTERNAL LAB: ALT    | Routin | 2019 |                      |   Results for this   |
|                      | e      |            |                      | procedure are in the |
|                      |        |            |                      |  results section.    |
+----------------------+--------+------------+----------------------+----------------------+
| EXTERNAL LAB: ELOY    | Routin | 2019 |                      |   Results for this   |
|                      | e      |            |                      | procedure are in the |
|                      |        |            |                      |  results section.    |
+----------------------+--------+------------+----------------------+----------------------+
| EXTERNAL LAB:        | Routin | 2019 |                      |   Results for this   |
| ALKALINE PHOSPHATASE | e      |            |                      | procedure are in the |
|                      |        |            |                      |  results section.    |
+----------------------+--------+------------+----------------------+----------------------+
| EXTERNAL LAB:        | Routin | 2019 |                      |   Results for this   |
| BILIRUBIN, TOTAL     | e      |            |                      | procedure are in the |
|                      |        |            |                      |  results section.    |
+----------------------+--------+------------+----------------------+----------------------+
| EXTERNAL LAB:        | Routin | 2019 |                      |   Results for this   |
| ALBUMIN              | e      |            |                      | procedure are in the |
|                      |        |            |                      |  results section.    |
+----------------------+--------+------------+----------------------+----------------------+
| EXTERNAL LAB:        | Routin | 2019 |                      |   Results for this   |
| PROTEIN, TOTAL       | e      |            |                      | procedure are in the |
|                      |        |            |                      |  results section.    |
+----------------------+--------+------------+----------------------+----------------------+
| EXTERNAL LAB:        | Routin | 2019 |                      |   Results for this   |
| PHOSPHORUS           | e      |            |                      | procedure are in the |
|                      |        |            |                      |  results section.    |
+----------------------+--------+------------+----------------------+----------------------+
| EXTERNAL LAB:        | Routin | 2019 |                      |   Results for this   |
| MAGNESIUM            | e      |            |                      | procedure are in the |
|                      |        |            |                      |  results section.    |
+----------------------+--------+------------+----------------------+----------------------+
 
| EXTERNAL LAB:        | Routin | 2019 |                      |   Results for this   |
| CALCIUM              | e      |            |                      | procedure are in the |
|                      |        |            |                      |  results section.    |
+----------------------+--------+------------+----------------------+----------------------+
| EXTERNAL LAB: CARBON | Routin | 2019 |                      |   Results for this   |
|  DIOXIDE             | e      |            |                      | procedure are in the |
|                      |        |            |                      |  results section.    |
+----------------------+--------+------------+----------------------+----------------------+
| EXTERNAL LAB:        | Routin | 2019 |                      |   Results for this   |
| CHLORIDE             | e      |            |                      | procedure are in the |
|                      |        |            |                      |  results section.    |
+----------------------+--------+------------+----------------------+----------------------+
| EXTERNAL LAB:        | Routin | 2019 |                      |   Results for this   |
| POTASSIUM            | e      |            |                      | procedure are in the |
|                      |        |            |                      |  results section.    |
+----------------------+--------+------------+----------------------+----------------------+
| EXTERNAL LAB: SODIUM | Routin | 2019 |                      |   Results for this   |
|                      | e      |            |                      | procedure are in the |
|                      |        |            |                      |  results section.    |
+----------------------+--------+------------+----------------------+----------------------+
| EXTERNAL LAB:        | Routin | 2019 |                      |   Results for this   |
| VITAMIN D,           | e      |            |                      | procedure are in the |
| 25-HYDROXY           |        |            |                      |  results section.    |
+----------------------+--------+------------+----------------------+----------------------+
| EXTERNAL LAB: CBC    | Routin | 2019 |                      |   Results for this   |
|                      | e      |            |                      | procedure are in the |
|                      |        |            |                      |  results section.    |
+----------------------+--------+------------+----------------------+----------------------+
| TACROLIMUS TROUGH    | Routin | 2019 |                      |   Results for this   |
| LC-MS/MS             | e      |            |                      | procedure are in the |
|                      |        |            |                      |  results section.    |
+----------------------+--------+------------+----------------------+----------------------+
| EXTERNAL LAB: TSH    | Routin | 2019 |                      |   Results for this   |
|                      | e      |            |                      | procedure are in the |
|                      |        |            |                      |  results section.    |
+----------------------+--------+------------+----------------------+----------------------+
| EXTERNAL LAB: EGFR   | Routin | 2019 |                      |   Results for this   |
|                      | e      |            |                      | procedure are in the |
|                      |        |            |                      |  results section.    |
+----------------------+--------+------------+----------------------+----------------------+
| EXTERNAL LAB:        | Routin | 2019 |                      |   Results for this   |
| CREATININE           | e      |            |                      | procedure are in the |
|                      |        |            |                      |  results section.    |
+----------------------+--------+------------+----------------------+----------------------+
| HEMOGLOBIN A1C       | Routin | 2019 |                      |   Results for this   |
|                      | e      |            |                      | procedure are in the |
|                      |        |            |                      |  results section.    |
+----------------------+--------+------------+----------------------+----------------------+
 documented in this encounter
 
 Results
 Tacrolimus Trough, LC-MS/MS (2019)
 
+-------------+-------+-----------+------------+--------------+
| Component   | Value | Ref Range | Performed  | Pathologist  |
|             |       |           | At         | Signature    |
+-------------+-------+-----------+------------+--------------+
| Tacrolimus, | 6.5   |           |            |              |
|  CMIA,      |       |           |            |              |
| External    |       |           |            |              |
 
+-------------+-------+-----------+------------+--------------+
 
 
 
+----------+
| Specimen |
+----------+
| Blood    |
+----------+
 Hemoglobin A1C (2019)
 
+-------------+-------+-----------+------------+--------------+
| Component   | Value | Ref Range | Performed  | Pathologist  |
|             |       |           | At         | Signature    |
+-------------+-------+-----------+------------+--------------+
| Hemoglobin  | 7.2   | %         | EXTERNAL   |              |
| A1c         |       |           | LAB        |              |
+-------------+-------+-----------+------------+--------------+
 
 
 
+----------+
| Specimen |
+----------+
| Blood    |
+----------+
 
 
 
+--------------------------+
| Resulting Agency Comment |
+--------------------------+
|   [facility]              |
+--------------------------+
 
 
 
+----------------+---------+--------------------+--------------+
| Performing     | Address | City/State/Zipcode | Phone Number |
| Organization   |         |                    |              |
+----------------+---------+--------------------+--------------+
|   EXTERNAL LAB |         |                    |              |
+----------------+---------+--------------------+--------------+
 External Lab: ALMA (2019)
 
+-----------+--------+-----------+------------+--------------+
| Component | Value  | Ref Range | Performed  | Pathologist  |
|           |        |           | At         | Signature    |
+-----------+--------+-----------+------------+--------------+
| BUN,      | 44 (A) | 6 - 23    | EXTERNAL   |              |
| External  |        |           | LAB        |              |
+-----------+--------+-----------+------------+--------------+
 
 
 
+--------------------------+
| Resulting Agency Comment |
+--------------------------+
|   [facility]              |
+--------------------------+
 
 
 
 
+----------------+---------+--------------------+--------------+
| Performing     | Address | City/State/Zipcode | Phone Number |
| Organization   |         |                    |              |
+----------------+---------+--------------------+--------------+
|   EXTERNAL LAB |         |                    |              |
+----------------+---------+--------------------+--------------+
 External Lab: Glucose (2019)
 
+-----------+---------+-----------+------------+--------------+
| Component | Value   | Ref Range | Performed  | Pathologist  |
|           |         |           | At         | Signature    |
+-----------+---------+-----------+------------+--------------+
| Glucose,  | 129 (A) | 70 - 100  | EXTERNAL   |              |
| External  |         |           | LAB        |              |
+-----------+---------+-----------+------------+--------------+
 
 
 
+--------------------------+
| Resulting Agency Comment |
+--------------------------+
|   [facility]              |
+--------------------------+
 
 
 
+----------------+---------+--------------------+--------------+
| Performing     | Address | City/State/Zipcode | Phone Number |
| Organization   |         |                    |              |
+----------------+---------+--------------------+--------------+
|   EXTERNAL LAB |         |                    |              |
+----------------+---------+--------------------+--------------+
 External Lab: ALT (2019)
 
+-----------+-------+-----------+------------+--------------+
| Component | Value | Ref Range | Performed  | Pathologist  |
|           |       |           | At         | Signature    |
+-----------+-------+-----------+------------+--------------+
| ALT,      | 27    | 7 - 52    | EXTERNAL   |              |
| External  |       |           | LAB        |              |
+-----------+-------+-----------+------------+--------------+
 
 
 
+--------------------------+
| Resulting Agency Comment |
+--------------------------+
|   [facility]              |
+--------------------------+
 
 
 
+----------------+---------+--------------------+--------------+
| Performing     | Address | City/State/Zipcode | Phone Number |
| Organization   |         |                    |              |
+----------------+---------+--------------------+--------------+
|   EXTERNAL LAB |         |                    |              |
 
+----------------+---------+--------------------+--------------+
 External Lab: AST (2019)
 
+-----------+-------+-----------+------------+--------------+
| Component | Value | Ref Range | Performed  | Pathologist  |
|           |       |           | At         | Signature    |
+-----------+-------+-----------+------------+--------------+
| AST,      | 32    | 13 - 39   | EXTERNAL   |              |
| External  |       |           | LAB        |              |
+-----------+-------+-----------+------------+--------------+
 
 
 
+--------------------------+
| Resulting Agency Comment |
+--------------------------+
|   [facility]              |
+--------------------------+
 
 
 
+----------------+---------+--------------------+--------------+
| Performing     | Address | City/State/Zipcode | Phone Number |
| Organization   |         |                    |              |
+----------------+---------+--------------------+--------------+
|   EXTERNAL LAB |         |                    |              |
+----------------+---------+--------------------+--------------+
 External Lab: Alkaline Phosphatase (2019)
 
+-----------+-------+-----------+------------+--------------+
| Component | Value | Ref Range | Performed  | Pathologist  |
|           |       |           | At         | Signature    |
+-----------+-------+-----------+------------+--------------+
| ALP,      | 90    | 31 - 130  | EXTERNAL   |              |
| External  |       |           | LAB        |              |
+-----------+-------+-----------+------------+--------------+
 
 
 
+--------------------------+
| Resulting Agency Comment |
+--------------------------+
|   [facility]              |
+--------------------------+
 
 
 
+----------------+---------+--------------------+--------------+
| Performing     | Address | City/State/Zipcode | Phone Number |
| Organization   |         |                    |              |
+----------------+---------+--------------------+--------------+
|   EXTERNAL LAB |         |                    |              |
+----------------+---------+--------------------+--------------+
 External Lab: Bilirubin, Total (2019)
 
+-------------+-------+-----------+------------+--------------+
| Component   | Value | Ref Range | Performed  | Pathologist  |
|             |       |           | At         | Signature    |
+-------------+-------+-----------+------------+--------------+
| Bilirubin,  | 0.7   | 0 - 1.2   | EXTERNAL   |              |
 
| Total,      |       |           | LAB        |              |
| External    |       |           |            |              |
+-------------+-------+-----------+------------+--------------+
 
 
 
+--------------------------+
| Resulting Agency Comment |
+--------------------------+
|   [facility]              |
+--------------------------+
 
 
 
+----------------+---------+--------------------+--------------+
| Performing     | Address | City/State/Zipcode | Phone Number |
| Organization   |         |                    |              |
+----------------+---------+--------------------+--------------+
|   EXTERNAL LAB |         |                    |              |
+----------------+---------+--------------------+--------------+
 External Lab: Albumin (2019)
 
+-----------+-------+-----------+------------+--------------+
| Component | Value | Ref Range | Performed  | Pathologist  |
|           |       |           | At         | Signature    |
+-----------+-------+-----------+------------+--------------+
| Albumin,  | 3.5   | 3.5 - 5   | EXTERNAL   |              |
| External  |       |           | LAB        |              |
+-----------+-------+-----------+------------+--------------+
 
 
 
+--------------------------+
| Resulting Agency Comment |
+--------------------------+
|   [facility]              |
+--------------------------+
 
 
 
+----------------+---------+--------------------+--------------+
| Performing     | Address | City/State/Zipcode | Phone Number |
| Organization   |         |                    |              |
+----------------+---------+--------------------+--------------+
|   EXTERNAL LAB |         |                    |              |
+----------------+---------+--------------------+--------------+
 External Lab: Protein, Total (2019)
 
+-----------+-------+-----------+------------+--------------+
| Component | Value | Ref Range | Performed  | Pathologist  |
|           |       |           | At         | Signature    |
+-----------+-------+-----------+------------+--------------+
| Protein,  | 6     | 6 - 8.3   | EXTERNAL   |              |
| Total,    |       |           | LAB        |              |
| External  |       |           |            |              |
+-----------+-------+-----------+------------+--------------+
 
 
 
+--------------------------+
 
| Resulting Agency Comment |
+--------------------------+
|   [facility]              |
+--------------------------+
 
 
 
+----------------+---------+--------------------+--------------+
| Performing     | Address | City/State/Zipcode | Phone Number |
| Organization   |         |                    |              |
+----------------+---------+--------------------+--------------+
|   EXTERNAL LAB |         |                    |              |
+----------------+---------+--------------------+--------------+
 External Lab: Phosphorus (2019)
 
+-------------+-------+-----------+------------+--------------+
| Component   | Value | Ref Range | Performed  | Pathologist  |
|             |       |           | At         | Signature    |
+-------------+-------+-----------+------------+--------------+
| Phosphorus, | 2.9   | 2.5 - 5   | EXTERNAL   |              |
|  External   |       |           | LAB        |              |
+-------------+-------+-----------+------------+--------------+
 
 
 
+--------------------------+
| Resulting Agency Comment |
+--------------------------+
|   [facility]              |
+--------------------------+
 
 
 
+----------------+---------+--------------------+--------------+
| Performing     | Address | City/State/Zipcode | Phone Number |
| Organization   |         |                    |              |
+----------------+---------+--------------------+--------------+
|   EXTERNAL LAB |         |                    |              |
+----------------+---------+--------------------+--------------+
 External Lab: Magnesium (2019)
 
+-------------+---------+-----------+------------+--------------+
| Component   | Value   | Ref Range | Performed  | Pathologist  |
|             |         |           | At         | Signature    |
+-------------+---------+-----------+------------+--------------+
| Magnesium,  | 1.6 (A) | 1.7 - 2.5 | EXTERNAL   |              |
| External    |         |           | LAB        |              |
+-------------+---------+-----------+------------+--------------+
 
 
 
+--------------------------+
| Resulting Agency Comment |
+--------------------------+
|   [facility]              |
+--------------------------+
 
 
 
+----------------+---------+--------------------+--------------+
 
| Performing     | Address | City/State/Zipcode | Phone Number |
| Organization   |         |                    |              |
+----------------+---------+--------------------+--------------+
|   EXTERNAL LAB |         |                    |              |
+----------------+---------+--------------------+--------------+
 External Lab: Calcium (2019)
 
+-----------+-------+------------+------------+--------------+
| Component | Value | Ref Range  | Performed  | Pathologist  |
|           |       |            | At         | Signature    |
+-----------+-------+------------+------------+--------------+
| Calcium,  | 9.7   | 8.2 - 10.3 | EXTERNAL   |              |
| External  |       |            | LAB        |              |
+-----------+-------+------------+------------+--------------+
 
 
 
+--------------------------+
| Resulting Agency Comment |
+--------------------------+
|   [facility]              |
+--------------------------+
 
 
 
+----------------+---------+--------------------+--------------+
| Performing     | Address | City/State/Zipcode | Phone Number |
| Organization   |         |                    |              |
+----------------+---------+--------------------+--------------+
|   EXTERNAL LAB |         |                    |              |
+----------------+---------+--------------------+--------------+
 External Lab: Carbon Dioxide (2019)
 
+-----------+-------+-----------+------------+--------------+
| Component | Value | Ref Range | Performed  | Pathologist  |
|           |       |           | At         | Signature    |
+-----------+-------+-----------+------------+--------------+
| Carbon    | 19    | 19 - 31   | EXTERNAL   |              |
| Dioxide,  |       |           | LAB        |              |
| External  |       |           |            |              |
+-----------+-------+-----------+------------+--------------+
 
 
 
+--------------------------+
| Resulting Agency Comment |
+--------------------------+
|   [facility]              |
+--------------------------+
 
 
 
+----------------+---------+--------------------+--------------+
| Performing     | Address | City/State/Zipcode | Phone Number |
| Organization   |         |                    |              |
+----------------+---------+--------------------+--------------+
|   EXTERNAL LAB |         |                    |              |
+----------------+---------+--------------------+--------------+
 External Lab: Chloride (2019)
 
 
+------------+-------+-----------+------------+--------------+
| Component  | Value | Ref Range | Performed  | Pathologist  |
|            |       |           | At         | Signature    |
+------------+-------+-----------+------------+--------------+
| Chloride,  | 105   | 95 - 112  | EXTERNAL   |              |
| External   |       |           | LAB        |              |
+------------+-------+-----------+------------+--------------+
 
 
 
+--------------------------+
| Resulting Agency Comment |
+--------------------------+
|   [facility]              |
+--------------------------+
 
 
 
+----------------+---------+--------------------+--------------+
| Performing     | Address | City/State/Zipcode | Phone Number |
| Organization   |         |                    |              |
+----------------+---------+--------------------+--------------+
|   EXTERNAL LAB |         |                    |              |
+----------------+---------+--------------------+--------------+
 External Lab: Potassium (2019)
 
+-------------+-------+-----------+------------+--------------+
| Component   | Value | Ref Range | Performed  | Pathologist  |
|             |       |           | At         | Signature    |
+-------------+-------+-----------+------------+--------------+
| Potassium,  | 4.5   | 3.6 - 5.1 | EXTERNAL   |              |
| External    |       |           | LAB        |              |
+-------------+-------+-----------+------------+--------------+
 
 
 
+--------------------------+
| Resulting Agency Comment |
+--------------------------+
|   [facility]              |
+--------------------------+
 
 
 
+----------------+---------+--------------------+--------------+
| Performing     | Address | City/State/Zipcode | Phone Number |
| Organization   |         |                    |              |
+----------------+---------+--------------------+--------------+
|   EXTERNAL LAB |         |                    |              |
+----------------+---------+--------------------+--------------+
 External Lab: Sodium (2019)
 
+-----------+-------+-----------+------------+--------------+
| Component | Value | Ref Range | Performed  | Pathologist  |
|           |       |           | At         | Signature    |
+-----------+-------+-----------+------------+--------------+
| Sodium,   | 141   | 132 - 143 | EXTERNAL   |              |
| External  |       |           | LAB        |              |
+-----------+-------+-----------+------------+--------------+
 
 
 
 
+--------------------------+
| Resulting Agency Comment |
+--------------------------+
|   [facility]              |
+--------------------------+
 
 
 
+----------------+---------+--------------------+--------------+
| Performing     | Address | City/State/Zipcode | Phone Number |
| Organization   |         |                    |              |
+----------------+---------+--------------------+--------------+
|   EXTERNAL LAB |         |                    |              |
+----------------+---------+--------------------+--------------+
 External Lab: Vitamin D, 25-Hydroxy (2019)
 
+-------------+-------+-----------+------------+--------------+
| Component   | Value | Ref Range | Performed  | Pathologist  |
|             |       |           | At         | Signature    |
+-------------+-------+-----------+------------+--------------+
| Vitamin D,  | 45    | 30 - 100  | EXTERNAL   |              |
| 25-Hydroxy, |       |           | LAB        |              |
|  External   |       |           |            |              |
+-------------+-------+-----------+------------+--------------+
 
 
 
+----------+
| Specimen |
+----------+
| Blood    |
+----------+
 
 
 
+--------------------------+
| Resulting Agency Comment |
+--------------------------+
|   [facility]              |
+--------------------------+
 
 
 
+----------------+---------+--------------------+--------------+
| Performing     | Address | City/State/Zipcode | Phone Number |
| Organization   |         |                    |              |
+----------------+---------+--------------------+--------------+
|   EXTERNAL LAB |         |                    |              |
+----------------+---------+--------------------+--------------+
 External Lab: CBC (2019)
 
+-----------+----------+-----------+------------+--------------+
| Component | Value    | Ref Range | Performed  | Pathologist  |
|           |          |           | At         | Signature    |
+-----------+----------+-----------+------------+--------------+
| WBC,      | 5.4      | 4.5 - 11  | EXTERNAL   |              |
| External  |          |           | LAB        |              |
+-----------+----------+-----------+------------+--------------+
 
| HGB,      | 13       | 12 - 16   | EXTERNAL   |              |
| External  |          |           | LAB        |              |
+-----------+----------+-----------+------------+--------------+
| HCT,      | 39       | 35 - 45   | EXTERNAL   |              |
| External  |          |           | LAB        |              |
+-----------+----------+-----------+------------+--------------+
| PLT,      | 160      | 140 - 440 | EXTERNAL   |              |
| External  |          |           | LAB        |              |
+-----------+----------+-----------+------------+--------------+
| RBC,      | 3.75 (A) | 3.8 - 5.1 | EXTERNAL   |              |
| External  |          |           | LAB        |              |
+-----------+----------+-----------+------------+--------------+
| MCV,      | 104 (A)  | 81 - 99   | EXTERNAL   |              |
| External  |          |           | LAB        |              |
+-----------+----------+-----------+------------+--------------+
| RDW,      | 35 (A)   | 27 - 33   | EXTERNAL   |              |
| External  |          |           | LAB        |              |
+-----------+----------+-----------+------------+--------------+
 
 
 
+--------------------------+
| Resulting Agency Comment |
+--------------------------+
|   [facility]              |
+--------------------------+
 
 
 
+----------------+---------+--------------------+--------------+
| Performing     | Address | City/State/Zipcode | Phone Number |
| Organization   |         |                    |              |
+----------------+---------+--------------------+--------------+
|   EXTERNAL LAB |         |                    |              |
+----------------+---------+--------------------+--------------+
 External Lab: TSH (2019)
 
+-----------+-------+------------+------------+--------------+
| Component | Value | Ref Range  | Performed  | Pathologist  |
|           |       |            | At         | Signature    |
+-----------+-------+------------+------------+--------------+
| TSH,      | 1.49  | 0.27 - 4.2 | EXTERNAL   |              |
| External  |       |            | LAB        |              |
+-----------+-------+------------+------------+--------------+
 
 
 
+----------+
| Specimen |
+----------+
| Blood    |
+----------+
 
 
 
+--------------------------+
| Resulting Agency Comment |
+--------------------------+
|   [facility]              |
+--------------------------+
 
 
 
 
+----------------+---------+--------------------+--------------+
| Performing     | Address | City/State/Zipcode | Phone Number |
| Organization   |         |                    |              |
+----------------+---------+--------------------+--------------+
|   EXTERNAL LAB |         |                    |              |
+----------------+---------+--------------------+--------------+
 External Lab: eGFR (2019)
 
+-----------+-------+-----------+------------+--------------+
| Component | Value | Ref Range | Performed  | Pathologist  |
|           |       |           | At         | Signature    |
+-----------+-------+-----------+------------+--------------+
| eGFR,     | 36    |           | EXTERNAL   |              |
| External  |       |           | LAB        |              |
+-----------+-------+-----------+------------+--------------+
 
 
 
+----------+
| Specimen |
+----------+
| Blood    |
+----------+
 
 
 
+--------------------------+
| Resulting Agency Comment |
+--------------------------+
|   [facility]              |
+--------------------------+
 
 
 
+----------------+---------+--------------------+--------------+
| Performing     | Address | City/State/Zipcode | Phone Number |
| Organization   |         |                    |              |
+----------------+---------+--------------------+--------------+
|   EXTERNAL LAB |         |                    |              |
+----------------+---------+--------------------+--------------+
 External Lab: Creatinine (2019)
 
+-------------+----------+------------+------------+--------------+
| Component   | Value    | Ref Range  | Performed  | Pathologist  |
|             |          |            | At         | Signature    |
+-------------+----------+------------+------------+--------------+
| Creatinine, | 1.42 (A) | 0.7 - 1.18 | EXTERNAL   |              |
|  External   |          |            | LAB        |              |
+-------------+----------+------------+------------+--------------+
 
 
 
+----------+
| Specimen |
+----------+
| Blood    |
+----------+
 
 
 
 
+--------------------------+
| Resulting Agency Comment |
+--------------------------+
|   [facility]              |
+--------------------------+
 
 
 
+----------------+---------+--------------------+--------------+
| Performing     | Address | City/State/Zipcode | Phone Number |
| Organization   |         |                    |              |
+----------------+---------+--------------------+--------------+
|   EXTERNAL LAB |         |                    |              |
+----------------+---------+--------------------+--------------+
 documented in this encounter
 
 Visit Diagnoses
 Not on filedocumented in this encounter

## 2020-01-08 NOTE — XMS
Encounter Summary
  Created on: 2020
 
 Viviana Ricci
 External Reference #: [id number]
 : 47
 Sex: Female
 
 Demographics
 
 
+-----------------------+----------------------+
| Address               | [address]      |
|                       | [place]  [address] |
+-----------------------+----------------------+
| Home Phone            | [phone]      |
+-----------------------+----------------------+
| Preferred Language    | Unknown              |
+-----------------------+----------------------+
| Marital Status        | Single               |
+-----------------------+----------------------+
| Holiness Affiliation | [id number]                 |
+-----------------------+----------------------+
| Race                  | Unknown              |
+-----------------------+----------------------+
| Ethnic Group          | Unknown              |
+-----------------------+----------------------+
 
 
 Author
 
 
+--------------+--------------------------------------------+
| Author       | [facility]  |
|              | and [facility]                                |
+--------------+--------------------------------------------+
| Organization | [facility]  |
|              | and [facility]                                |
+--------------+--------------------------------------------+
| Address      | Unknown                                    |
+--------------+--------------------------------------------+
| Phone        | Unavailable                                |
+--------------+--------------------------------------------+
 
 
 
 Support
 
 
+----------------+--------------+---------------------+-----------------+
| Name           | Relationship | Address             | Phone           |
+----------------+--------------+---------------------+-----------------+
| Ada/Ed Radhames | ECON         | [place]              | [phone] |
|                |              | [place]  |                 |
|                |              |  [id number]              |                 |
+----------------+--------------+---------------------+-----------------+
 
 
 
 
 Care Team Providers
 
 
+-----------------------+------+-------------+
| Care Team Member Name | Role | Phone       |
+-----------------------+------+-------------+
 PCP  | Unavailable |
+-----------------------+------+-------------+
 
 
 
 Reason for Visit
 
 
+-------------------+----------+
| Reason            | Comments |
+-------------------+----------+
| Medication Refill |          |
+-------------------+----------+
 
 
 
 Encounter Details
 
 
+--------+--------+---------------------+----------------------+-------------------+
| Date   | Type   | Department          | Care Team            | Description       |
+--------+--------+---------------------+----------------------+-------------------+
| / | Refill |   [facility]         |   Marzena Moser  | Medication Refill |
|    |        | NEPHROLOGY  [address]   | M, DO  [address]      |                   |
|        |        | [address]   | [address]      |                   |
|        |        | [place], [place]     | [place], [place]      |                   |
|        |        | [address]          | [address]  [phone]  |                   |
|        |        | [phone]        |  [phone] (Fax)  |                   |
+--------+--------+---------------------+----------------------+-------------------+
 
 
 
 Social History
 
 
+--------------+-------+-----------+--------+------+
| Tobacco Use  | Types | Packs/Day | Years  | Date |
|              |       |           | Used   |      |
+--------------+-------+-----------+--------+------+
| Never Smoker |       |           |        |      |
+--------------+-------+-----------+--------+------+
 
 
 
+---------------------+---+---+---+
| Smokeless Tobacco:  |   |   |   |
| Never Used          |   |   |   |
+---------------------+---+---+---+
 
 
 
+---------------------------------------------------------------+
| Comments: some second hand smoke exposure, but fairly minimal |
 
+---------------------------------------------------------------+
 
 
 
+-------------+-------------+---------+----------+
| Alcohol Use | Drinks/Week | oz/Week | Comments |
+-------------+-------------+---------+----------+
| No          |             |         |          |
+-------------+-------------+---------+----------+
 
 
 
+------------------+---------------+
| Sex Assigned at  | Date Recorded |
| Birth            |               |
+------------------+---------------+
| Not on file      |               |
+------------------+---------------+
 
 
 
+----------------+-------------+-------------+
| Job Start Date | Occupation  | Industry    |
+----------------+-------------+-------------+
| Not on file    | Not on file | Not on file |
+----------------+-------------+-------------+
 
 
 
+----------------+--------------+------------+
| Travel History | Travel Start | Travel End |
+----------------+--------------+------------+
 
 
 
+-------------------------------------+
| No recent travel history available. |
+-------------------------------------+
 documented as of this encounter
 
 Plan of Treatment
 
 
+--------+-----------+------------+----------------------+-------------------+
| Date   | Type      | Specialty  | Care Team            | Description       |
+--------+-----------+------------+----------------------+-------------------+
| / | Office    | Cardiology |   HellalfredoTonia,    |                   |
|    | Visit     |            | ARNP  [address]   |                   |
|        |           |            | St  [place], [place]  |                   |
|        |           |            | [address]  [phone]  |                   |
|        |           |            |  [phone] (Fax)  |                   |
+--------+-----------+------------+----------------------+-------------------+
| / | Hospital  | Radiology  |   Popeye Townsend, |                   |
|    | Encounter |            |  MD  [address] |                   |
|        |           |            |  St.  [place],   |                   |
|        |           |            | [place] [address]             |                   |
|        |           |            | [phone]         |                   |
|        |           |            | [phone] (Fax)   |                   |
+--------+-----------+------------+----------------------+-------------------+
| / | Surgery   | Radiology  |   Popeye Townsend, | CV EP PPM SYSTEM  |
 
|    |           |            |  MD  [address] | IMPLANT           |
|        |           |            |  St.  [place],   |                   |
|        |           |            | [place] [address]             |                   |
|        |           |            | [phone]         |                   |
|        |           |            | [phone] (Fax)   |                   |
+--------+-----------+------------+----------------------+-------------------+
| 02/10/ | Clinical  | Cardiology |                      |                   |
|    | Support   |            |                      |                   |
+--------+-----------+------------+----------------------+-------------------+
| / | Office    | Cardiology |   Tonia Wilson,    |                   |
|    | Visit     |            | ARNP  [address]   |                   |
|        |           |            | [place]  |                   |
|        |           |            | [address]  [phone]  |                   |
|        |           |            |  [phone] (Fax)  |                   |
+--------+-----------+------------+----------------------+-------------------+
| / | Off-Site  | Nephrology |   Marzena Moser  |                   |
|    | Visit     |            | DO ETIENNE  [address] [place] [address]      |                   |
|        |           |            | [address]      |                   |
|        |           |            | [place]      |                   |
|        |           |            | [address]  [phone]  |                   |
|        |           |            |  [phone] (Fax)  |                   |
+--------+-----------+------------+----------------------+-------------------+
 documented as of this encounter
 
 Visit Diagnoses
 
 
+----------------------------------------------------------------------------------------+
| Diagnosis                                                                              |
+----------------------------------------------------------------------------------------+
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
| left (HCC) - Primary                                                                   |
+----------------------------------------------------------------------------------------+
 documented in this encounter

## 2020-01-08 NOTE — XMS
Encounter Summary
  Created on: 2020
 
 Viviana Ricci
 External Reference #: [id number]
 : 47
 Sex: Female
 
 Demographics
 
 
+-----------------------+----------------------+
| Address               | [address]      |
|                       | [place]  [address] |
+-----------------------+----------------------+
| Home Phone            | [phone]      |
+-----------------------+----------------------+
| Preferred Language    | Unknown              |
+-----------------------+----------------------+
| Marital Status        | Single               |
+-----------------------+----------------------+
| Christianity Affiliation | 1009                 |
+-----------------------+----------------------+
| Race                  | Unknown              |
+-----------------------+----------------------+
| Ethnic Group          | Unknown              |
+-----------------------+----------------------+
 
 
 Author
 
 
+--------------+--------------------------------------------+
| Author       | [facility]  |
|              | and [facility]                                |
+--------------+--------------------------------------------+
| Organization | [facility]  |
|              | and [facility]                                |
+--------------+--------------------------------------------+
| Address      | Unknown                                    |
+--------------+--------------------------------------------+
| Phone        | Unavailable                                |
+--------------+--------------------------------------------+
 
 
 
 Support
 
 
+----------------+--------------+---------------------+-----------------+
| Name           | Relationship | Address             | Phone           |
+----------------+--------------+---------------------+-----------------+
| Ada/Ed Radhames | ECON         | [place]              | [phone] |
|                |              | [place]  |                 |
|                |              |  [address]              |                 |
+----------------+--------------+---------------------+-----------------+
 
 
 
 
 Care Team Providers
 
 
+-----------------------+------+-------------+
| Care Team Member Name | Role | Phone       |
+-----------------------+------+-------------+
 PCP  | Unavailable |
+-----------------------+------+-------------+
 
 
 
 Reason for Visit
 
 
+-------------------+----------+
| Reason            | Comments |
+-------------------+----------+
| Medication Refill |          |
+-------------------+----------+
 
 
 
 Encounter Details
 
 
+--------+--------+---------------------+----------------------+-------------------+
| Date   | Type   | Department          | Care Team            | Description       |
+--------+--------+---------------------+----------------------+-------------------+
| / | Refill |   [place]         |   Marzena Moser  | Medication Refill |
| 2016   |        | NEPHROLOGY  [address]   | M, DO  [address]      |                   |
|        |        | [address]   | [address]      |                   |
|        |        | [place], [place]     | [place], [place]      |                   |
|        |        | [address]          | [address]  [phone]  |                   |
|        |        | [phone]        |  [phone] (Fax)  |                   |
+--------+--------+---------------------+----------------------+-------------------+
 
 
 
 Social History
 
 
+--------------+-------+-----------+--------+------+
| Tobacco Use  | Types | Packs/Day | Years  | Date |
|              |       |           | Used   |      |
+--------------+-------+-----------+--------+------+
| Never Smoker |       |           |        |      |
+--------------+-------+-----------+--------+------+
 
 
 
+---------------------+---+---+---+
| Smokeless Tobacco:  |   |   |   |
| Never Used          |   |   |   |
+---------------------+---+---+---+
 
 
 
+---------------------------------------------------------------+
| Comments: some second hand smoke exposure, but fairly minimal |
 
+---------------------------------------------------------------+
 
 
 
+-------------+-------------+---------+----------+
| Alcohol Use | Drinks/Week | oz/Week | Comments |
+-------------+-------------+---------+----------+
| No          |             |         |          |
+-------------+-------------+---------+----------+
 
 
 
+------------------+---------------+
| Sex Assigned at  | Date Recorded |
| Birth            |               |
+------------------+---------------+
| Not on file      |               |
+------------------+---------------+
 
 
 
+----------------+-------------+-------------+
| Job Start Date | Occupation  | Industry    |
+----------------+-------------+-------------+
| Not on file    | Not on file | Not on file |
+----------------+-------------+-------------+
 
 
 
+----------------+--------------+------------+
| Travel History | Travel Start | Travel End |
+----------------+--------------+------------+
 
 
 
+-------------------------------------+
| No recent travel history available. |
+-------------------------------------+
 documented as of this encounter
 
 Plan of Treatment
 
 
+--------+-----------+------------+----------------------+-------------------+
| Date   | Type      | Specialty  | Care Team            | Description       |
+--------+-----------+------------+----------------------+-------------------+
| / | Office    | Cardiology |   HellalfredoTonia,    |                   |
|    | Visit     |            | ARNP  [address]   |                   |
|        |           |            | St  [place], [place]  |                   |
|        |           |            | [address]  [phone]  |                   |
|        |           |            |  [phone] (Fax)  |                   |
+--------+-----------+------------+----------------------+-------------------+
| / | Hospital  | Radiology  |   Popeye Townsend, |                   |
|    | Encounter |            |  MD  [address] |                   |
|        |           |            |  St.  [place],   |                   |
|        |           |            | [place] [address]             |                   |
|        |           |            | [phone]         |                   |
|        |           |            | [phone] (Fax)   |                   |
+--------+-----------+------------+----------------------+-------------------+
| / | Surgery   | Radiology  |   Popeye Townsend, | CV EP PPM SYSTEM  |
 
|    |           |            |  MD  [address] | IMPLANT           |
|        |           |            |  St.  [place],   |                   |
|        |           |            | [place] [address]             |                   |
|        |           |            | [phone]         |                   |
|        |           |            | [phone] (Fax)   |                   |
+--------+-----------+------------+----------------------+-------------------+
| 02/10/ | Clinical  | Cardiology |                      |                   |
|    | Support   |            |                      |                   |
+--------+-----------+------------+----------------------+-------------------+
| / | Office    | Cardiology |   Tonia Wilson,    |                   |
|    | Visit     |            | ARNP  [address]   |                   |
|        |           |            | [place]  |                   |
|        |           |            | [address]  [phone]  |                   |
|        |           |            |  [phone] (Fax)  |                   |
+--------+-----------+------------+----------------------+-------------------+
| / | Off-Site  | Nephrology |   Marzena Moser  |                   |
|    | Visit     |            | DO ETIENNE  [address] [place] [address]      |                   |
|        |           |            | [address]      |                   |
|        |           |            | [place]      |                   |
|        |           |            | [address]  [phone]  |                   |
|        |           |            |  [phone] (Fax)  |                   |
+--------+-----------+------------+----------------------+-------------------+
 documented as of this encounter
 
 Visit Diagnoses
 Not on filedocumented in this encounter

## 2020-01-08 NOTE — XMS
Encounter Summary
  Created on: 2020
 
 Viviana Ricci
 External Reference #: [id number]
 : 47
 Sex: Female
 
 Demographics
 
 
+-----------------------+----------------------+
| Address               | [address]      |
|                       | [place]  [address] |
+-----------------------+----------------------+
| Home Phone            | [phone]      |
+-----------------------+----------------------+
| Preferred Language    | Unknown              |
+-----------------------+----------------------+
| Marital Status        | Single               |
+-----------------------+----------------------+
| [employer] Affiliation | [id number]                 |
+-----------------------+----------------------+
| Race                  | Unknown              |
+-----------------------+----------------------+
| Ethnic Group          | Unknown              |
+-----------------------+----------------------+
 
 
 Author
 
 
+--------------+--------------------------------------------+
| Author       | [facility]  |
|              | and [place]                                |
+--------------+--------------------------------------------+
| Organization | [facility] [place]  |
|              | and [place]                                |
+--------------+--------------------------------------------+
| Address      | Unknown                                    |
+--------------+--------------------------------------------+
| Phone        | Unavailable                                |
+--------------+--------------------------------------------+
 
 
 
 Support
 
 
+----------------+--------------+---------------------+-----------------+
| Name           | Relationship | Address             | Phone           |
+----------------+--------------+---------------------+-----------------+
| Ada/Ed Radhames | ECON         | [address]              | [phone] |
|                |              | [place]  |                 |
|                |              |  [address]              |                 |
+----------------+--------------+---------------------+-----------------+
 
 
 
 
 Care Team Providers
 
 
+-----------------------+------+-------------+
| Care Team Member Name | Role | Phone       |
+-----------------------+------+-------------+
 PCP  | Unavailable |
+-----------------------+------+-------------+
 
 
 
 Encounter Details
 
 
+--------+-----------+----------------------+----------------------+-------------+
| Date   | Type      | Department           | Care Team            | Description |
+--------+-----------+----------------------+----------------------+-------------+
| / | [facility]  |   [facility] |   Marzena Moser  |             |
|    | Encounter |  MED CTR XRAY  [address] | M, DO  [address]      |             |
|        |           |  [place]       | [address]      |             |
|        |           | [place] [address] | [place]      |             |
|        |           |   [phone]       | [address] [phone]  |             |
|        |           |                      |  [phone] (Fax)  |             |
+--------+-----------+----------------------+----------------------+-------------+
 
 
 
 Social History
 
 
+----------------+-------+-----------+--------+------+
| Tobacco Use    | Types | Packs/Day | Years  | Date |
|                |       |           | Used   |      |
+----------------+-------+-----------+--------+------+
| Never Assessed |       |           |        |      |
+----------------+-------+-----------+--------+------+
 
 
 
+------------------+---------------+
| Sex Assigned at  | Date Recorded |
| Birth            |               |
+------------------+---------------+
| Not on file      |               |
+------------------+---------------+
 
 
 
+----------------+-------------+-------------+
| Job Start Date | Occupation  | Industry    |
+----------------+-------------+-------------+
| Not on file    | Not on file | Not on file |
+----------------+-------------+-------------+
 
 
 
+----------------+--------------+------------+
| Travel History | Travel Start | Travel End |
+----------------+--------------+------------+
 
 
 
 
+-------------------------------------+
| No recent travel history available. |
+-------------------------------------+
 documented as of this encounter
 
 Plan of Treatment
 
 
+--------+-----------+------------+----------------------+-------------------+
| Date   | Type      | Specialty  | Care Team            | Description       |
+--------+-----------+------------+----------------------+-------------------+
| / | Office    | Cardiology |   Tonia Wilson,    |                   |
|    | Visit     |            | ARNJESSICA  [address]   |                   |
|        |           |            | St  [place], [place]  |                   |
|        |           |            | [address]  [phone]  |                   |
|        |           |            |  [phone] (Fax)  |                   |
+--------+-----------+------------+----------------------+-------------------+
| / | Hospital  | Radiology  |   Popeye Townsend, |                   |
|    | Encounter |            |  MD  [address] |                   |
|        |           |            |  St. [place],   |                   |
|        |           |            | [place] [address]             |                   |
|        |           |            | [phone]         |                   |
|        |           |            | [phone] (Fax)   |                   |
+--------+-----------+------------+----------------------+-------------------+
| / | Surgery   | Radiology  |   Popeye Townsend, | CV EP PPM SYSTEM  |
|    |           |            |  MD  [address] | IMPLANT           |
|        |           |            |  [place],   |                   |
|        |           |            | [place] [address]             |                   |
|        |           |            | [phone]         |                   |
|        |           |            | [phone] (Fax)   |                   |
+--------+-----------+------------+----------------------+-------------------+
| 02/10/ | Clinical  | Cardiology |                      |                   |
|    | Support   |            |                      |                   |
+--------+-----------+------------+----------------------+-------------------+
| / | Office    | Cardiology |   Tonia Wilson,    |                   |
|    | Visit     |            | BLEKIS  [address]   |                   |
|        |           |            | [place]  |                   |
|        |           |            | [address]  [phone]  |                   |
|        |           |            |  [phone] (Fax)  |                   |
+--------+-----------+------------+----------------------+-------------------+
| / | Off-Site  | Nephrology |   Marzena Moser  |                   |
|    | Visit     |            | DO ETIENNE  [address] [place] [address]      |                   |
|        |           |            | [address]      |                   |
|        |           |            | [place]      |                   |
|        |           |            | [address] [phone]  |                   |
|        |           |            |  [phone] (Fax)  |                   |
+--------+-----------+------------+----------------------+-------------------+
 documented as of this encounter
 
 Visit Diagnoses
 Not on filedocumented in this encounter

## 2020-01-08 NOTE — XMS
Encounter Summary
  Created on: 2020
 
 Viviana Ricci
 External Reference #: [id number]
 : 47
 Sex: Female
 
 Demographics
 
 
+-----------------------+----------------------+
| Address               | [address]      |
|                       | [place]  [address] |
+-----------------------+----------------------+
| Home Phone            | [phone]      |
+-----------------------+----------------------+
| Preferred Language    | Unknown              |
+-----------------------+----------------------+
| Marital Status        | Single               |
+-----------------------+----------------------+
| Ca[employer]ism Affiliation | 1009                 |
+-----------------------+----------------------+
| Race                  | Unknown              |
+-----------------------+----------------------+
| Ethnic Group          | Unknown              |
+-----------------------+----------------------+
 
 
 Author
 
 
+--------------+--------------------------------------------+
| Author       | [facility]  |
|              | and [place]                                |
+--------------+--------------------------------------------+
| Organization | [facility]  |
|              | and [place]                                |
+--------------+--------------------------------------------+
| Address      | Unknown                                    |
+--------------+--------------------------------------------+
| Phone        | Unavailable                                |
+--------------+--------------------------------------------+
 
 
 
 Support
 
 
+----------------+--------------+---------------------+-----------------+
| Name           | Relationship | Address             | Phone           |
+----------------+--------------+---------------------+-----------------+
| Ada/Ed Rahdames | ECON         | [address]              | [phone] |
|                |              | [place]  |                 |
|                |              |  [address]              |                 |
+----------------+--------------+---------------------+-----------------+
 
 
 
 
 Care Team Providers
 
 
+------------------------+------+-----------------+
| Care Team Member Name  | Role | Phone           |
+------------------------+------+-----------------+
| Marzena Moser DO | PCP  | [phone] |
+------------------------+------+-----------------+
 
 
 
 Reason for Visit
 
 
+--------+----------+
| Reason | Comments |
+--------+----------+
| Other  |          |
+--------+----------+
 
 
 
 Encounter Details
 
 
+--------+-----------+---------------------+----------------------+-------------+
| Date   | Type      | Department          | Care Team            | Description |
+--------+-----------+---------------------+----------------------+-------------+
| / | Telephone |   [facility]         |   Marzena Moser  | Other       |
| 2019   |           | NEPHROLOGY  [address]   | M, DO  [address]      |             |
|        |           | [address]   | [address]      |             |
|        |           | [place], [place]     | [place], [place]      |             |
|        |           | [address]          | [address]  [phone]  |             |
|        |           | [phone]        |  [phone] (Fax)  |             |
+--------+-----------+---------------------+----------------------+-------------+
 
 
 
 Social History
 
 
+--------------+-------+-----------+--------+------+
| Tobacco Use  | Types | Packs/Day | Years  | Date |
|              |       |           | Used   |      |
+--------------+-------+-----------+--------+------+
| Never Smoker |       |           |        |      |
+--------------+-------+-----------+--------+------+
 
 
 
+---------------------+---+---+---+
| Smokeless Tobacco:  |   |   |   |
| Never Used          |   |   |   |
+---------------------+---+---+---+
 
 
 
+---------------------------------------------------------------+
| Comments: some second hand smoke exposure, but fairly minimal |
 
+---------------------------------------------------------------+
 
 
 
+-------------+-------------+---------+----------+
| Alcohol Use | Drinks/Week | oz/Week | Comments |
+-------------+-------------+---------+----------+
| No          |             |         |          |
+-------------+-------------+---------+----------+
 
 
 
+------------------+---------------+
| Sex Assigned at  | Date Recorded |
| Birth            |               |
+------------------+---------------+
| Not on file      |               |
+------------------+---------------+
 
 
 
+----------------+-------------+-------------+
| Job Start Date | Occupation  | Industry    |
+----------------+-------------+-------------+
| Not on file    | Not on file | Not on file |
+----------------+-------------+-------------+
 
 
 
+----------------+--------------+------------+
| Travel History | Travel Start | Travel End |
+----------------+--------------+------------+
 
 
 
+-------------------------------------+
| No recent travel history available. |
+-------------------------------------+
 documented as of this encounter
 
 Plan of Treatment
 
 
+--------+-----------+------------+----------------------+-------------------+
| Date   | Type      | Specialty  | Care Team            | Description       |
+--------+-----------+------------+----------------------+-------------------+
| / | Office    | Cardiology |   Tonia Wilson,    |                   |
| 2020   | Visit     |            | ARNJESSICA  [address]   |                   |
|        |           |            | St [place], [place]  |                   |
|        |           |            | [address]  [phone]  |                   |
|        |           |            |  [phone] (Fax)  |                   |
+--------+-----------+------------+----------------------+-------------------+
| / | Hospital  | Radiology  |   Popeye Townsend, |                   |
|    | Encounter |            |  MD [address] |                   |
|        |           |            |  St. [place],   |                   |
|        |           |            | [place] [address]             |                   |
|        |           |            | [phone]         |                   |
|        |           |            | [phone] (Fax)   |                   |
+--------+-----------+------------+----------------------+-------------------+
| / | Surgery   | Radiology  |   Popeye Townsend, | CV EP PPM SYSTEM  |
 
|    |           |            |  MD  [address] | IMPLANT           |
|        |           |            |  St. [place],   |                   |
|        |           |            | [place] [address]             |                   |
|        |           |            | [phone]         |                   |
|        |           |            | [phone] (Fax)   |                   |
+--------+-----------+------------+----------------------+-------------------+
| 02/10/ | Clinical  | Cardiology |                      |                   |
|    | Support   |            |                      |                   |
+--------+-----------+------------+----------------------+-------------------+
| / | Office    | Cardiology |   Tonia Wilson,    |                   |
|    | Visit     |            | BELKIS  [address]   |                   |
|        |           |            | [place]  |                   |
|        |           |            | [address]  [phone]  |                   |
|        |           |            |  [phone] (Fax)  |                   |
+--------+-----------+------------+----------------------+-------------------+
| / | Off-Site  | Nephrology |   Marzena Moser  |                   |
|    | Visit     |            | M, DO  [address]      |                   |
|        |           |            | [address]      |                   |
|        |           |            | [place]      |                   |
|        |           |            | [address] [phone]  |                   |
|        |           |            |  [phone] (Fax)  |                   |
+--------+-----------+------------+----------------------+-------------------+
 documented as of this encounter
 
 Visit Diagnoses
 Not on filedocumented in this encounter

## 2020-01-08 NOTE — XMS
Encounter Summary
  Created on: 2020
 
 Viviana Ricci
 External Reference #: [id number]
 : 47
 Sex: Female
 
 Demographics
 
 
+-----------------------+----------------------+
| Address               | [address]      |
|                       | [place]  [address] |
+-----------------------+----------------------+
| Home Phone            | [phone]      |
+-----------------------+----------------------+
| Preferred Language    | Unknown              |
+-----------------------+----------------------+
| Marital Status        | Single               |
+-----------------------+----------------------+
| Quaker Affiliation | 1009                 |
+-----------------------+----------------------+
| Race                  | Unknown              |
+-----------------------+----------------------+
| Ethnic Group          | Unknown              |
+-----------------------+----------------------+
 
 
 Author
 
 
+--------------+--------------------------------------------+
| Author       | [facility]  |
|              | and [place]                                |
+--------------+--------------------------------------------+
| Organization | [facility]  |
|              | and [place]                                |
+--------------+--------------------------------------------+
| Address      | Unknown                                    |
+--------------+--------------------------------------------+
| Phone        | Unavailable                                |
+--------------+--------------------------------------------+
 
 
 
 Support
 
 
+----------------+--------------+---------------------+-----------------+
| Name           | Relationship | Address             | Phone           |
+----------------+--------------+---------------------+-----------------+
| Ada/Ed Radhames | ECON         | [address]              | [phone] |
|                |              | [place]  |                 |
|                |              |  [address]              |                 |
+----------------+--------------+---------------------+-----------------+
 
 
 
 
 Care Team Providers
 
 
+-----------------------+------+-------------+
| Care Team Member Name | Role | Phone       |
+-----------------------+------+-------------+
 PCP  | Unavailable |
+-----------------------+------+-------------+
 
 
 
 Encounter Details
 
 
+--------+-----------+----------------------+----------------------+-------------+
| Date   | Type      | Department           | Care Team            | Description |
+--------+-----------+----------------------+----------------------+-------------+
| / | [facility]  |   [facility] |   Marzena Moser  |             |
|  - | Encounter |  MED CTR GENERIC OP  | M, DO  [address]      |             |
|        |           | CONV DEPT  [address]     | [address]      |             |
| / |           | [address]  [place], | [place]      |             |
|    |           |  [place] [address]       | [id number] [phone]  |             |
|        |           | [phone]         |  [phone] (Fax)  |             |
+--------+-----------+----------------------+----------------------+-------------+
 
 
 
 Social History
 
 
+----------------+-------+-----------+--------+------+
| Tobacco Use    | Types | Packs/Day | Years  | Date |
|                |       |           | Used   |      |
+----------------+-------+-----------+--------+------+
| Never Assessed |       |           |        |      |
+----------------+-------+-----------+--------+------+
 
 
 
+------------------+---------------+
| Sex Assigned at  | Date Recorded |
| Birth            |               |
+------------------+---------------+
| Not on file      |               |
+------------------+---------------+
 
 
 
+----------------+-------------+-------------+
| Job Start Date | Occupation  | Industry    |
+----------------+-------------+-------------+
| Not on file    | Not on file | Not on file |
+----------------+-------------+-------------+
 
 
 
+----------------+--------------+------------+
| Travel History | Travel Start | Travel End |
+----------------+--------------+------------+
 
 
 
 
+-------------------------------------+
| No recent travel history available. |
+-------------------------------------+
 documented as of this encounter
 
 Plan of Treatment
 
 
+--------+-----------+------------+----------------------+-------------------+
| Date   | Type      | Specialty  | Care Team            | Description       |
+--------+-----------+------------+----------------------+-------------------+
| / | Office    | Cardiology |   Tonia Wilson,    |                   |
|    | Visit     |            | [address]   |                   |
|        |           |            | St  [place], [place]  |                   |
|        |           |            | [address]  [phone]  |                   |
|        |           |            |  [phone] (Fax)  |                   |
+--------+-----------+------------+----------------------+-------------------+
| / | Hospital  | Radiology  |   Popeye Townsend, |                   |
|    | Encounter |            |  MD  [address] |                   |
|        |           |            |  St.  [place],   |                   |
|        |           |            | [place] [address]             |                   |
|        |           |            | [phone]         |                   |
|        |           |            | [phone] (Fax)   |                   |
+--------+-----------+------------+----------------------+-------------------+
| / | Surgery   | Radiology  |   Popeye Townsend, | CV EP PPM SYSTEM  |
|    |           |            |  MD  [address] | IMPLANT           |
|        |           |            |  St.  [place],   |                   |
|        |           |            | [place] [address]             |                   |
|        |           |            | [phone]         |                   |
|        |           |            | [phone] (Fax)   |                   |
+--------+-----------+------------+----------------------+-------------------+
| 02/10/ | Clinical  | Cardiology |                      |                   |
|    | Support   |            |                      |                   |
+--------+-----------+------------+----------------------+-------------------+
| / | Office    | Cardiology |   Tonia Wilson,    |                   |
|    | Visit     |            | BELKIS  [address]   |                   |
|        |           |            | [place]  |                   |
|        |           |            | [address]  [phone]  |                   |
|        |           |            |  [phone] (Fax)  |                   |
+--------+-----------+------------+----------------------+-------------------+
| / | Off-Site  | Nephrology |   Marzena Moser  |                   |
|    | Visit     |            | DO ETIENNE  [address] [place] [address]      |                   |
|        |           |            | [address]      |                   |
|        |           |            | [place]      |                   |
|        |           |            | [address]  [phone]  |                   |
|        |           |            |  [phone] (Fax)  |                   |
+--------+-----------+------------+----------------------+-------------------+
 documented as of this encounter
 
 Visit Diagnoses
 Not on filedocumented in this encounter

## 2020-01-08 NOTE — XMS
Encounter Summary
  Created on: 2020
 
 Viviana Ricci
 External Reference #: [id number]
 : 47
 Sex: Female
 
 Demographics
 
 
+-----------------------+----------------------+
| Address               | [address]      |
|                       | [place]  [address] |
+-----------------------+----------------------+
| Home Phone            | [phone]      |
+-----------------------+----------------------+
| Preferred Language    | Unknown              |
+-----------------------+----------------------+
| Marital Status        | Single               |
+-----------------------+----------------------+
| Holiness Affiliation | [id number]                 |
+-----------------------+----------------------+
| Race                  | Unknown              |
+-----------------------+----------------------+
| Ethnic Group          | Unknown              |
+-----------------------+----------------------+
 
 
 Author
 
 
+--------------+--------------------------------------------+
| Author       | [facility]  |
|              | and [place]                                |
+--------------+--------------------------------------------+
| Organization | [facility]  |
|              | and [place]                                |
+--------------+--------------------------------------------+
| Address      | Unknown                                    |
+--------------+--------------------------------------------+
| Phone        | Unavailable                                |
+--------------+--------------------------------------------+
 
 
 
 Support
 
 
+----------------+--------------+---------------------+-----------------+
| Name           | Relationship | Address             | Phone           |
+----------------+--------------+---------------------+-----------------+
| Ada/Ed Radhames | ECON         | [address]              | [phone] |
|                |              | [place]  |                 |
|                |              |  [address]              |                 |
+----------------+--------------+---------------------+-----------------+
 
 
 
 
 Care Team Providers
 
 
+-----------------------+------+-------------+
| Care Team Member Name | Role | Phone       |
+-----------------------+------+-------------+
 PCP  | Unavailable |
+-----------------------+------+-------------+
 
 
 
 Encounter Details
 
 
+--------+-----------+----------------------+----------------------+-------------+
| Date   | Type      | Department           | Care Team            | Description |
+--------+-----------+----------------------+----------------------+-------------+
| 03/10/ | [facility]  |   [facility] |   Edgar Sanders,   |             |
|    | Encounter |  [facility]  | MD Ervin TURNER      |             |
|        |           |  [address]  [place] | [place]      |             |
|        |           |  [place]           | [address]  [phone]  |             |
|        |           | [address]           |  [phone] (Fax)  |             |
|        |           | [phone]         |                      |             |
+--------+-----------+----------------------+----------------------+-------------+
 
 
 
 Social History
 
 
+----------------+-------+-----------+--------+------+
| Tobacco Use    | Types | Packs/Day | Years  | Date |
|                |       |           | Used   |      |
+----------------+-------+-----------+--------+------+
| Never Assessed |       |           |        |      |
+----------------+-------+-----------+--------+------+
 
 
 
+------------------+---------------+
| Sex Assigned at  | Date Recorded |
| Birth            |               |
+------------------+---------------+
| Not on file      |               |
+------------------+---------------+
 
 
 
+----------------+-------------+-------------+
| Job Start Date | Occupation  | Industry    |
+----------------+-------------+-------------+
| Not on file    | Not on file | Not on file |
+----------------+-------------+-------------+
 
 
 
+----------------+--------------+------------+
| Travel History | Travel Start | Travel End |
+----------------+--------------+------------+
 
 
 
 
+-------------------------------------+
| No recent travel history available. |
+-------------------------------------+
 documented as of this encounter
 
 Plan of Treatment
 
 
+--------+-----------+------------+----------------------+-------------------+
| Date   | Type      | Specialty  | Care Team            | Description       |
+--------+-----------+------------+----------------------+-------------------+
| / | Office    | Cardiology |   Tonia Wilson,    |                   |
|    | Visit     |            | [address]   |                   |
|        |           |            | St  [place], [place]  |                   |
|        |           |            | [address]  [phone]  |                   |
|        |           |            |  [phone] (Fax)  |                   |
+--------+-----------+------------+----------------------+-------------------+
| / | Hospital  | Radiology  |   Popeye Townsend, |                   |
|    | Encounter |            |  MD  [address] |                   |
|        |           |            |  [place],   |                   |
|        |           |            | [place] [address]             |                   |
|        |           |            | [phone]         |                   |
|        |           |            | [phone] (Fax)   |                   |
+--------+-----------+------------+----------------------+-------------------+
| / | Surgery   | Radiology  |   Popeye Townsend, | CV EP PPM SYSTEM  |
|    |           |            |  MD  [address] | IMPLANT           |
|        |           |            |  St.  [place],   |                   |
|        |           |            | [place] [address]             |                   |
|        |           |            | [phone]         |                   |
|        |           |            | [phone] (Fax)   |                   |
+--------+-----------+------------+----------------------+-------------------+
| 02/10/ | Clinical  | Cardiology |                      |                   |
|    | Support   |            |                      |                   |
+--------+-----------+------------+----------------------+-------------------+
| / | Office    | Cardiology |   Toina Wilson,    |                   |
|    | Visit     |            | BELKIS  [address]   |                   |
|        |           |            | [place]  |                   |
|        |           |            | [address]  [phone]  |                   |
|        |           |            |  [phone] (Fax)  |                   |
+--------+-----------+------------+----------------------+-------------------+
| / | Off-Site  | Nephrology |   Marzena Moser  |                   |
|    | Visit     |            | DO ETIENNE  [address] [place] [address]      |                   |
|        |           |            | [address]      |                   |
|        |           |            | [place]      |                   |
|        |           |            | [address] [phone]  |                   |
|        |           |            |  [phone] (Fax)  |                   |
+--------+-----------+------------+----------------------+-------------------+
 documented as of this encounter
 
 Visit Diagnoses
 Not on filedocumented in this encounter

## 2020-01-08 NOTE — XMS
Encounter Summary
  Created on: 2020
 
 Viviana Ricci
 External Reference #: [id number]
 : 47
 Sex: Female
 
 Demographics
 
 
+-----------------------+----------------------+
| Address               | [address]      |
|                       | [place]  [address] |
+-----------------------+----------------------+
| Home Phone            | [phone]      |
+-----------------------+----------------------+
| Preferred Language    | Unknown              |
+-----------------------+----------------------+
| Marital Status        | Single               |
+-----------------------+----------------------+
| Religion Affiliation | 1009                 |
+-----------------------+----------------------+
| Race                  | Unknown              |
+-----------------------+----------------------+
| Ethnic Group          | Unknown              |
+-----------------------+----------------------+
 
 
 Author
 
 
+--------------+--------------------------------------------+
| Author       | [facility]  |
|              | and [place]                                |
+--------------+--------------------------------------------+
| Organization | [facility]  |
|              | and [place]                                |
+--------------+--------------------------------------------+
| Address      | Unknown                                    |
+--------------+--------------------------------------------+
| Phone        | Unavailable                                |
+--------------+--------------------------------------------+
 
 
 
 Support
 
 
+----------------+--------------+---------------------+-----------------+
| Name           | Relationship | Address             | Phone           |
+----------------+--------------+---------------------+-----------------+
| Ada/Ed Radhames | ECON         | [address]              | [phone] |
|                |              | [place]  |                 |
|                |              |  [address]              |                 |
+----------------+--------------+---------------------+-----------------+
 
 
 
 
 Care Team Providers
 
 
+-----------------------+------+-------------+
| Care Team Member Name | Role | Phone       |
+-----------------------+------+-------------+
 PCP  | Unavailable |
+-----------------------+------+-------------+
 
 
 
 Encounter Details
 
 
+--------+-----------+----------------------+-----------+-------------+
| Date   | Type      | Department           | Care Team | Description |
+--------+-----------+----------------------+-----------+-------------+
| / | Hospital  |   [facility] |           |             |
|    | Encounter |  [facility] |           |             |
|        |           |   [address]       |           |             |
|        |           | [place]      |           |             |
|        |           | [address]           |           |             |
|        |           | [phone]         |           |             |
+--------+-----------+----------------------+-----------+-------------+
 
 
 
 Social History
 
 
+----------------+-------+-----------+--------+------+
| Tobacco Use    | Types | Packs/Day | Years  | Date |
|                |       |           | Used   |      |
+----------------+-------+-----------+--------+------+
| Never Assessed |       |           |        |      |
+----------------+-------+-----------+--------+------+
 
 
 
+------------------+---------------+
| Sex Assigned at  | Date Recorded |
| Birth            |               |
+------------------+---------------+
| Not on file      |               |
+------------------+---------------+
 
 
 
+----------------+-------------+-------------+
| Job Start Date | Occupation  | Industry    |
+----------------+-------------+-------------+
| Not on file    | Not on file | Not on file |
+----------------+-------------+-------------+
 
 
 
+----------------+--------------+------------+
| Travel History | Travel Start | Travel End |
+----------------+--------------+------------+
 
 
 
 
+-------------------------------------+
| No recent travel history available. |
+-------------------------------------+
 documented as of this encounter
 
 Plan of Treatment
 
 
+--------+-----------+------------+----------------------+-------------------+
| Date   | Type      | Specialty  | Care Team            | Description       |
+--------+-----------+------------+----------------------+-------------------+
| / | Office    | Cardiology |   Tonia Wilson,    |                   |
|    | Visit     |            | [place]  [address]   |                   |
|        |           |            | St  [place] [facility], [place]  |                   |
|        |           |            | [address]  [phone]  |                   |
|        |           |            |  [phone] (Fax)  |                   |
+--------+-----------+------------+----------------------+-------------------+
| / | Hospital  | Radiology  |   Popeye Townsend, |                   |
|    | Encounter |            |  MD  [address] |                   |
|        |           |            |  St.  [place],   |                   |
|        |           |            | [place] [address]             |                   |
|        |           |            | [phone]         |                   |
|        |           |            | [phone] (Fax)   |                   |
+--------+-----------+------------+----------------------+-------------------+
| / | Surgery   | Radiology  |   Popeye Townsend, | CV EP PPM SYSTEM  |
|    |           |            |  MD  [address] | IMPLANT           |
|        |           |            |  St.  [place],   |                   |
|        |           |            | [place] [address]             |                   |
|        |           |            | [phone]         |                   |
|        |           |            | [phone] (Fax)   |                   |
+--------+-----------+------------+----------------------+-------------------+
| 02/10/ | Clinical  | Cardiology |                      |                   |
|    | Support   |            |                      |                   |
+--------+-----------+------------+----------------------+-------------------+
| / | Office    | Cardiology |   Tonia Wilson,    |                   |
|    | Visit     |            | [facility]  [address]   |                   |
|        |           |            | [place]  |                   |
|        |           |            | [address]  [phone]  |                   |
|        |           |            |  [phone] (Fax)  |                   |
+--------+-----------+------------+----------------------+-------------------+
| / | Off-Site  | Nephrology |   Marzena Moser  |                   |
|    | Visit     |            | DO ETIENNE  [address] [place] [address]      |                   |
|        |           |            | [address]      |                   |
|        |           |            | [place]      |                   |
|        |           |            | [address] [phone]  |                   |
|        |           |            |  [phone] (Fax)  |                   |
+--------+-----------+------------+----------------------+-------------------+
 documented as of this encounter
 
 Visit Diagnoses
 Not on filedocumented in this encounter

## 2020-01-08 NOTE — XMS
Encounter Summary
  Created on: 2020
 
 Viviana Ricci
 External Reference #: [id number]
 : 47
 Sex: Female
 
 Demographics
 
 
+-----------------------+----------------------+
| Address               | [address]      |
|                       | [place]  [address] |
+-----------------------+----------------------+
| Home Phone            | [phone]      |
+-----------------------+----------------------+
| Preferred Language    | Unknown              |
+-----------------------+----------------------+
| Marital Status        | Single               |
+-----------------------+----------------------+
| Tenriism Affiliation | [id number]                 |
+-----------------------+----------------------+
| Race                  | Unknown              |
+-----------------------+----------------------+
| Ethnic Group          | Unknown              |
+-----------------------+----------------------+
 
 
 Author
 
 
+--------------+--------------------------------------------+
| Author       | [facility]  |
|              | and [facility]                                |
+--------------+--------------------------------------------+
| Organization | [facility]  |
|              | and [place]                                |
+--------------+--------------------------------------------+
| Address      | Unknown                                    |
+--------------+--------------------------------------------+
| Phone        | Unavailable                                |
+--------------+--------------------------------------------+
 
 
 
 Support
 
 
+----------------+--------------+---------------------+-----------------+
| Name           | Relationship | Address             | Phone           |
+----------------+--------------+---------------------+-----------------+
| Ada/Ed Radhames | ECON         | [address]              | [phone] |
|                |              | [place]  |                 |
|                |              |  [id number]              |                 |
+----------------+--------------+---------------------+-----------------+
 
 
 
 
 Care Team Providers
 
 
+------------------------+------+-----------------+
| Care Team Member Name  | Role | Phone           |
+------------------------+------+-----------------+
| Marzena Moser DO | PCP  | [phone] |
+------------------------+------+-----------------+
 
 
 
 Reason for Visit
 
 
+------------------+----------+
| Reason           | Comments |
+------------------+----------+
| Follow-up        |          |
+------------------+----------+
| Coronary Artery  |          |
| Disease          |          |
+------------------+----------+
| Hypertension     |          |
+------------------+----------+
| Hyperlipidemia   |          |
+------------------+----------+
 
 
 
 Encounter Details
 
 
+--------+---------+----------------------+----------------------+----------------------+
| Date   | Type    | Department           | Care Team            | Description          |
+--------+---------+----------------------+----------------------+----------------------+
| / | Office  |   [facility]          |   Tonia Wilson,    | Hypertension,        |
| 2018   | Visit   | CARDIOLOGY  [address]    | ARNP  [address]   | essential (Primary   |
|        |         | [address] | [address]  [place]  | Dx); Coronary artery |
|        |         |  [place] [address]       | [address] [phone]  |  disease involving   |
|        |         | [phone]         |  [phone] (Fax)  | native coronary      |
|        |         |                      |                      | artery of native     |
|        |         |                      |                      | heart without angina |
|        |         |                      |                      |  pectoris; Mixed     |
|        |         |                      |                      | hyperlipidemia; PVC  |
|        |         |                      |                      | (premature           |
|        |         |                      |                      | ventricular          |
|        |         |                      |                      | contraction);        |
|        |         |                      |                      | Hypothyroidism,      |
|        |         |                      |                      | unspecified type;    |
|        |         |                      |                      | Vitamin D deficiency |
+--------+---------+----------------------+----------------------+----------------------+
 
 
 
 Social History
 
 
+--------------+-------+-----------+--------+------+
| Tobacco Use  | Types | Packs/Day | Years  | Date |
 
|              |       |           | Used   |      |
+--------------+-------+-----------+--------+------+
| Never Smoker |       |           |        |      |
+--------------+-------+-----------+--------+------+
 
 
 
+---------------------+---+---+---+
| Smokeless Tobacco:  |   |   |   |
| Never Used          |   |   |   |
+---------------------+---+---+---+
 
 
 
+---------------------------------------------------------------+
| Comments: some second hand smoke exposure, but fairly minimal |
+---------------------------------------------------------------+
 
 
 
+-------------+-------------+---------+----------+
| Alcohol Use | Drinks/Week | oz/Week | Comments |
+-------------+-------------+---------+----------+
| No          |             |         |          |
+-------------+-------------+---------+----------+
 
 
 
+------------------+---------------+
| Sex Assigned at  | Date Recorded |
| Birth            |               |
+------------------+---------------+
| Not on file      |               |
+------------------+---------------+
 
 
 
+----------------+-------------+-------------+
| Job Start Date | Occupation  | Industry    |
+----------------+-------------+-------------+
| Not on file    | Not on file | Not on file |
+----------------+-------------+-------------+
 
 
 
+----------------+--------------+------------+
| Travel History | Travel Start | Travel End |
+----------------+--------------+------------+
 
 
 
+-------------------------------------+
| No recent travel history available. |
+-------------------------------------+
 documented as of this encounter
 
 Last Filed Vital Signs
 
 
+-------------------+----------------------+----------------------+-----------+
 
| Vital Sign        | Reading              | Time Taken           | Comments  |
+-------------------+----------------------+----------------------+-----------+
| Blood Pressure    | 104/72               | 2018  2:31 PM  |           |
|                   |                      | PDT                  |           |
+-------------------+----------------------+----------------------+-----------+
| Pulse             | 58                   | 2018  2:31 PM  | irregular |
|                   |                      | PDT                  |           |
+-------------------+----------------------+----------------------+-----------+
| Temperature       | -                    | -                    |           |
+-------------------+----------------------+----------------------+-----------+
| Respiratory Rate  | 12                   | 2018  2:31 PM  |           |
|                   |                      | PDT                  |           |
+-------------------+----------------------+----------------------+-----------+
| Oxygen Saturation | -                    | -                    |           |
+-------------------+----------------------+----------------------+-----------+
| Inhaled Oxygen    | -                    | -                    |           |
| Concentration     |                      |                      |           |
+-------------------+----------------------+----------------------+-----------+
| Weight            | 121.2 kg (267 lb 3.2 | 2018  2:31 PM  |           |
|                   |  oz)                 | PDT                  |           |
+-------------------+----------------------+----------------------+-----------+
| Height            | 167.6 cm (5' 6")     | 2018  2:31 PM  |           |
|                   |                      | PDT                  |           |
+-------------------+----------------------+----------------------+-----------+
| Body Mass Index   | 43.13                | 2018  2:31 PM  |           |
|                   |                      | PDT                  |           |
+-------------------+----------------------+----------------------+-----------+
 documented in this encounter
 
 Progress Notes
 Tonia Wilson ARNP - 2018  3:00 PM PDTFormatting of this note might be different fr
om the original.
    
 
 PATIENT NAME:  Viviana Ricci  
 : 1945:         AGE: 72 y.o.
  PRIMARY CARE:
 Marzena Moser DO
 CC:   
 
 OUTPATIENT FOLLOW UP VISIT
 
 Date of Service: 2018 
 
 HISTORY OF PRESENT ILLNESS:
 Viviana Ricci is a 72 y.o. female with a history of Coronary artery disease post CABG x 3 
in , history of diabetes, renal failure post a renal transplantation, morbid obesity, in
activity, hypertension, hyperlipidemia, pulmonary hypertension and severe arthritis.  She is
 being seen today for follow up coronary artery disease.
 
 She was last seen 17 at which time she had no changes in her medical regimen.  Since 
that time, she reports she has been about the same. She only had one day where the smoke in 
the air seemed to bother her breathing, and otherwise has been fine. She has had a poor ener
gy level chronically. She will sometimes take a nap. She has not been very active. She does 
her cooking, laundry, changes her sheets, take out the garbage, but her deep cleaning is don
e by a . She enjoys playing with her dog, or watching TV in her spare time.  She 
has not had any chest pain or discomfort at rest or with exertion. She has had shortness of 
breath with just general daily living at times. Some says she doesn't have any symptoms at a
ll, and other days she can have symptoms with walking to the bathroom.  She has not had any 
lightheadedness or dizziness.  She has not noticed palpitations.  She has had leg swelling i
 
n mostly her left leg, however she does have swelling in both legs at end of the day, Lasix 
40 mg daily.  She notes that if she is careful about not eating salt that her leg swelling i
s better.  She was using her furosemide more as needed until she saw Dr. Avina, on 8/15/1
8 pulmonologist, who counseled her to take it daily as she was having 3+ pitting edema on he
r left leg and 2+ pitting edema on her right.  She sleeps with CPAP machine in place nightly
 with oxygen bled in at 2.5 liters per minute. She sleeps on a flat pillow at night.
 
 MEDICAL, SURGICAL, AND PERSONAL HISTORY
 Past Medical, Surgical, Family, and Social History are reviewed in EPIC.
 
 CURRENT PROBLEMS
 Patient Active Problem List 
 Diagnosis 
   Chronic low back pain 
   Hypothyroidism 
   Mixed hyperlipidemia 
   Complications of transplanted kidney 
   Movement disorder 
   Diabetes mellitus type II, uncontrolled  
   Pulmonary hypertension  
   Coronary artery disease involving native coronary artery of native heart without angina
 pectoris 
   JACK on CPAP 
   Obesity hypoventilation syndrome  
   Kidney replaced by transplant 
   Secondary hyperparathyroidism  
   Dyspnea 
   FSGS (focal segmental glomerulosclerosis) 
   Murmur 
   Cardiomegaly 
   Hypertension, essential 
   PLMD (periodic limb movement disorder) 
   Chest pain 
   UTI (lower urinary tract infection) 
   Renal transplant recipient 
   Thyroid activity decreased 
   Type 2 DM with CKD stage 2 and hypertension  
 
 CURRENT MEDICATIONS
 Current Outpatient Prescriptions 
 Medication Sig Dispense Refill 
   allopurinol (ZYLOPRIM) 100 mg tablet take 1 tablet by mouth once daily 30 tablet 11 
   aspirin 81 mg EC tablet Take 81 mg by mouth Daily.   
   B-D INS SYRINGE 0.5CC/31GX5/16 31G X 5/16" 0.5 ML MISC use as directed BEFORE MEALS 100
 each 11 
   cholecalciferol (VITAMIN D-3) 2000 UNITS TABS Take 5,000 Units by mouth Every other day
.   
   fludrocortisone (FLORINEF) 0.1 mg tablet take 1 tablet by mouth every other day 90 tabl
et 4 
   furosemide (LASIX) 40 mg tablet Take 1 tablet by mouth Daily as needed. 30 tablet 5 
   glucose blood VI test strips (ONE TOUCH ULTRA TEST) strip Check blood sugar before each
 meal and as directed 100 each 12 
   insulin lispro (HUMALOG) 100 units/mL injection (vial) inject subcutaneously BEFORE CHIKA
LS ACCORDING TO SLIDING SCALE Max dose 20 units daily 10 vial 11 
   LANTUS 100 UNIT/ML injection (vial) inject 20 units subcutaneously every morning 10 via
l 11 
   levothyroxine (SYNTHROID) 50 mcg tablet take 1 tablet by mouth once daily 30 tablet 11 
   lisinopril (PRINIVIL,ZESTRIL) 30 MG tablet take 1 tablet by mouth once daily 90 tablet 
3 
   loperamide (ANTI-DIARRHEAL) 2 mg capsule Take 1 capsule by mouth 4 times daily as neede
 
d. 60 capsule 5 
   losartan (COZAAR) 50 mg tablet take 1 tablet by mouth once daily 90 tablet 3 
   magnesium oxide (MAG-OX) 400 mg tablet take 1 tablet by mouth twice a day 60 tablet 11 
   metoprolol tartrate (LOPRESSOR) 25 mg tablet take 1 tablet by mouth twice a day 60 tabl
et 11 
   mycophenolate (CELLCEPT) 250 mg capsule Take 250 mg by mouth 2 times daily.   
   omeprazole (PRILOSEC) 20 mg capsule Take 20 mg by mouth every morning (before breakfast
).   
   predniSONE (DELTASONE) 5 mg tablet Take 5 mg by mouth Daily.   
   Prenatal Vit-Fe Fumarate-FA (PNV PRENATAL PLUS MULTIVITAMIN) 27-1 MG TABS take 1 tablet
 by mouth once daily. 30 tablet 11 
   QVAR REDIHALER 80 MCG/ACT inhaler    
   Respiratory Therapy Supplies MISC Continue nocturnal O2 at 2 l/m through CPAP for lifet
bart. Dx: JACK G47.33 Please provide new nasal mask for CPAP and any other needed replacement 
supplies. 1 each 0 
   Respiratory Therapy Supplies MISC Decrease CPAP to 12-18 cmH2O Diagnosis Code(s)327.23.
 Please send order to [facility]. 1 each 0 
   rosuvastatin (CRESTOR) 20 mg tablet take 1 tablet by mouth NIGHTLY 30 tablet 11 
   SENSIPAR 30 MG tablet take 1 tablet by mouth once daily 30 tablet 11 
   tacrolimus (PROGRAF) 0.5 mg capsule Take 1 capsule by mouth every morning. For a total 
of 1.5 mg, A.M., and 1 mg, PM. 30 capsule 11 
   tacrolimus (PROGRAF) 1 mg capsule Take 1 capsule by mouth 2 times daily. For a total of
 1.5 mg, A.M., and 1 mg, PM. 60 capsule 11 
 
 No current facility-administered medications for this visit.  
  
 
 ALLERGIES
 No Known Allergies
 
 ROS
 Review of Systems 
 Constitutional: Positive for malaise/fatigue. Negative for chills, diaphoresis, fever and w
eight loss. 
 HENT: Negative for hearing loss, nosebleeds and tinnitus.  
 Eyes: Negative for blurred vision and double vision. 
 Respiratory: Positive for shortness of breath. Negative for cough.  
 Cardiovascular: Positive for orthopnea (CPAP with oxygen at 2.5 liters) and leg swelling. N
egative for chest pain, palpitations, claudication and PND. 
 Gastrointestinal: Negative for abdominal pain, blood in stool, constipation, diarrhea, hear
tburn, melena, nausea and vomiting. 
 Genitourinary: Positive for frequency. Negative for hematuria and urgency. 
 Musculoskeletal: Positive for back pain and neck pain. Negative for falls, joint pain and m
yalgias. 
 Skin: Negative for itching and rash. 
 Neurological: Positive for tremors. Negative for dizziness, tingling, seizures, loss of con
sciousness, weakness and headaches. 
      Positive for gait problems, uses a walker for streadyness
 Negative for dental problems
 Negative for lightheadedness 
 Endo/Heme/Allergies: Negative for environmental allergies. Bruises/bleeds easily. 
 Psychiatric/Behavioral: Negative for memory loss. The patient is not nervous/anxious and do
es not have insomnia.  
  
 
 OBJECTIVE:  
 
 PHYSICAL EXAM
 /72  | Pulse 58 Comment: irregular | Resp 12  | Ht 1.676 m (5' 6")  | Wt 121.2 kg (26
7 lb 3.2 oz)  | BMI 43.13 kg/m 
 
 Physical Exam 
 Constitutional: She is oriented to person, place, and time. She appears well-developed and 
well-nourished. 
 Elderly female in no acute distress, arrived alone 
 Neck: Normal carotid pulses and no JVD present. Carotid bruit is not present. 
 Cardiovascular: Normal rate, regular rhythm, S1 normal, S2 normal and intact distal pulses.
  PMI is not displaced.  Exam reveals distant heart sounds (due to girth). Exam reveals no g
allop and no friction rub.  
 Murmur heard.
  Holosystolic murmur is present with a grade of 1/6 
 Pulses:
      Carotid pulses are 2+ on the right side, and 2+ on the left side.
      Posterior tibial pulses are 1+ on the right side, and 1+ on the left side. 
 Pulmonary/Chest: Effort normal and breath sounds normal. No accessory muscle usage. No resp
iratory distress. She has no wheezes. She has no rhonchi. She has no rales. 
 Abdominal: Soft. Normal appearance and normal aorta. She exhibits no abdominal bruit. There
 is no hepatosplenomegaly (difficult to fully evaluate due to body habitus). There is no ten
derness. 
 Musculoskeletal: She exhibits edema (trace at right ankle, 1+ on left ankle). 
 Neurological: She is alert and oriented to person, place, and time. Gait (using front wheel
ed walker) abnormal. 
 Skin: Skin is warm and dry. No cyanosis. Nails show no clubbing. 
 Psychiatric: She has a normal mood and affect. Her mood appears not anxious. She does not e
xhibit a depressed mood. 
 
 ECG: I personally independently reviewed ECG tracing during this visit (interpreted and cherise
led by another provider):
 Results for orders placed or performed in visit on 17 
 ECG 12 lead 
 Result Value Ref Range 
  INTERPRETATION TEXT   
   Sinus rhythm with 1st degree AV block
 Left axis deviation
 Abnormal ECG
 When compared with ECG of 08-DEC-2015 15:26,
 No significant change was found
 Confirmed by ABEBE TOWNSEND MD ([id number]) on 2017 4:11:18 PM
  
   Which is compared to today's ECG 2018: Sinus rhythm with PACs and PVC with rate of 7
0 beats per minute 
 
 LAB RESULTS reviewed during visit today primarily from [facility]: 
 LIPID
 Lab Results 
 Component Value Date 
  CHOL 162 2013 
  TRIG 225 2013 
  HDL 45 2013 
  LDL 72 2013 
  LDLEX 45 2018 
  HDLEX 53.7 2018 
  TRIGEX 163 (A) 2018 
  CHOLEX 131 2018 
 
 CHEMISTRY
 Lab Results 
 Component Value Date 
   2014 
  GLUEX 87 2018 
 
   2014 
  NAEX 139 2018 
  K 5.4 2014 
  KEX 4.5 2018 
   2014 
  CLEX 104 2018 
  CO2 22 2014 
  CO2EX 17 (A) 2018 
  CALCIUM 10.4 2014 
  ALKPHOS 100 2014 
  AST 20 07/10/2013 
  ASTEX 24 2018 
  ALT 14 07/10/2013 
  ALTEX 16 2018 
  BILITOT 0.6 2014 
  CREA 1.7 07/10/2013 
  BUN 40 2014 
  EGFR 31.0 07/10/2013 
  EGFREX 33 2018 
  CREEX 1.54 (A) 2018 
 
 HEMATOLOGY
 Lab Results 
 Component Value Date 
  WBC 4.6 07/10/2013 
  WBCEX 7.1 2018 
  HGB 11.9 07/10/2013 
  HGBEX 14.7 2018 
  HCT 35.7 07/10/2013 
  HCTEX 45.2 (A) 2018 
   (A) 07/10/2013 
  PLTEX 135 (A) 2018 
  
 Lab Results 
 Component Value Date 
  TSH 0.69 07/10/2013 
  TSHEX 1.64 2016 
   (H) 2013 
   (H) 2013 
 
 I reviewed records from alex Avina MD for office visit on 8/15/18 which is sum
marized in the HPI. 
 
 IMAGING- I reviewed reports from [facility]:
 Result Date: 8/15/2018
 CLINICAL INFORMATION: under-treated partially compensated CHF. COMPARISON: 3 2016. FINDINGS
: Frontal and lateral views of the chest. Median sternotomy changes. Lungs: No focal airspac
e disease, pleural effusion, or pneumothorax.  Minimal bilateral basilar dependent atelectas
is versus scarring. Heart/mediastinum: Borderline cardiomegaly. No mediastinal widening. Bon
es: No acute osseous abnormality appreciated. IMPRESSION - Borderline cardiomegaly.  No acut
e pulmonary disease. Dictated and Signed by: Jorge Thomas MD  Electronically signed: 8/15
/2018 2:16 PM
   
 Echocardiogram 18 showing, moderate left atrial dilatation. Normal left ventricular s
ize with a mild concentric left ventricular hypertrophy.  Left ventricular systolic dysfunct
ion is preserved.  LVEF is 60-65%. Mild aortic root dilatation measuring 3.9 cm in diameter,
 mildly thickened and calcified trileaflet aortic valve with adequate opening. There is aort
ic valve sclerosis without significant aortic valve stenosis. Mildly thickened and calcified
 mitral valve with a mild mitral valve regurgitation, mild mitral annular calcification. Nor
mal right-sided pressure. Normal IVC with normal respiratory collapse. When compared to echo
 
cardiography on 4/15/14, no significant changes.
 
 Above data and testing is reviewed this visit; testing below is historical data unless othe
rwise specified.
 ASSESSMENT:  
 
 1. Coronary artery disease:
 A.  Status post CABG x 3 in  with LIMA to the LAD, SVG to the OM1 and OM2.
  B.  A persantine sestamibi stress test on 07 revealed a medium
 sized, moderate in severity fixed defect of the anterior wall, and a small sized mild in se
verity fixed defect of the inferior wall, LVEF by gated SPECT was 66%.
             C.  Bilateral heart catheterization on 05/03/10, shows severe two vessel corona
ry artery disease, a chronic occlusion through the proximal portion of the left anterior jed
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
rtery, grafts: open left internal mammary artery graft to the mid left anterior descending a
rtery, open saphenous vein grafts to the OM1 and OM2, mildly dilated left ventricular cavity
 with a normal left systolic function, LVEF 70%, mild to moderate pulmonary hypertension and
 a normal cardiac output, coronary circulation is right dominant, normal system pressure.
             D.  Persantine nuclear medicine stress test 14 shows a normal myocardial p
erfusion imaging study with normal left ventricular size, wall thickness, LVEF by gated SPEC
T of 78%.  
             E.  Today, 2018, she denies chest pain. She remains on aspirin, high inten
sity statin, beta-blocker, ACE-I and ARB (as directed by nephrologist).
 
 2.  Right sided heart failure/ cor pulmonale / severe pulmonary hypertension:
  A. The echocardiogram from 02/19/10 revealed moderate bi-atrial dila
tation and normal left ventricular size, wall thickness and motion, LVEF is 55-60%, dilated 
right ventricle with moderate hypo-kinesis, moderate tricuspid valve regurgitation, severe p
ulmonary hypertension with a peak systolic pressure of 80 mmHg, and a small pericardial effu
jaspreet. 
             B. Echocardiogram from 4/15/14 showed Mild left atrial dilatation. Normal left 
ventricular size, wall thickness and motion. Preserved  left ventricular systolic function. 
LVEF is 70-75%. Grade 1 left ventricular diastole dysfunction. Mild tricuspid valve regurgit
ation. Mild thickened trileaflet aortic valve with adequate opening. A mild aortic valve ins
ufficiency. Normal right-sided pressure.
  C. Echocardiogram 18 showing, moderate left atrial dilatation. Normal left ventricul
ar size with a mild concentric left ventricular hypertrophy.  Left ventricular systolic dysf
unction is preserved.  LVEF is 60-65%. Mild aortic root dilatation measuring 3.9 cm in diame
ter, mildly thickened and calcified trileaflet aortic valve with adequate opening. There is 
aortic valve sclerosis without significant aortic valve stenosis. Mildly thickened and calci
fied mitral valve with a mild mitral valve regurgitation, mild mitral annular calcification.
 Normal right-sided pressure. Normal IVC with normal respiratory collapse. When compared to 
echocardiography on 4/15/14, no significant changes.
  D. Today, 2018, her echocardiogram results are reviewed with her.  No pulmonary hyper
tension noted, no right-sided heart failure on echocardiogram.  She is in class IIb [place]
 Heart Association functional class and appears well compensated on daily diuretic. 
 
 3. Hypertension, essential:
             A.  Today, 2018, her blood pressure is well-controlled. 
 
 4.  Hyperlipidemia, mixed.
             A. Today, 2018, she remains on high intensity statin with rosuvastatin 20 
mg. 
 
 5.  Obstructive sleep apnea:
             A.  She uses a CPAP machine at night with 2.5 L of oxygen bled in.
 
 6.  Morbid obesity.
             A. Today, 2018, Body mass index is 43.13 kg/m.  Essentially unchanged.
 
 
 7. Type II diabetes. Not otherwise addressed today 2018. 
 
 8. Chronic kidney disease: Not otherwise addressed today 2018. 
             A.  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 Followed by Dr. Moser.
 
 9.  DVT left leg 2015: Not otherwise addressed today 2018. 
             A.  She took a one year course of apixaban, now on aspirin alone.
 
 10.  Hypothyroidism. She hasn't had lab for this for a couple years. Not otherwise addresse
d today, 2018.
 
 PLAN:  
 
 1. She will have labs done for BMP, magnesium, TSH, free T4, and vitamin D.  She will be se
nt a letter or called with the results. 
 2. She will otherwise continue with her current medical regimen.
 3. She will follow up in 1 year for office visit, or sooner with concerns.  She will have a
n ECG at her follow up visit and She will have fasting labs prior to visit for lipid profile
, CMP and CBC, if not done prior by another provider.
 
 Electronically signed by: 
 BELKIS Arredondo on 2018 
 
 I, LALA Devi, am acting as a scribe on behalf of, and in the presence of BELKIS Arredondo. - LALA Devi 2018 14:39  
 I, BELKIS Arredondo, personally performed the services described in this documentation, 
as scribed in my presence and it is both accurate and complete. BELKIS Arredondo 
8  
 Portions of this chart may have been created with Dragon voice recognition software. Occasi
onal wrong-word or   sound-alike  substitutions may have occurred due to the inherent naqvi
itations of voice recognition software. Please read the chart carefully and recognize, using
 context, where these substitutions have occurred.   Electronically signed by BELKIS Arredondo at 2018  3:51 PM PDTdocumented in this encounter
 
 Plan of Treatment
 
 
+--------+-----------+------------+----------------------+-------------------+
| Date   | Type      | Specialty  | Care Team            | Description       |
+--------+-----------+------------+----------------------+-------------------+
| / | Office    | Cardiology |   Tonia Wilson,    |                   |
|    | Visit     |            | ARNP  [address]   |                   |
|        |           |            | [place], [place]  |                   |
|        |           |            | [id number] [phone]  |                   |
|        |           |            |  [phone] (Fax)  |                   |
+--------+-----------+------------+----------------------+-------------------+
| / | Hospital  | Radiology  |   Abebe Townsend, |                   |
|    | Encounter |            |  MD  [address] |                   |
|        |           |            |  St.  [place],   |                   |
|        |           |            | [place] [address]             |                   |
|        |           |            | [phone]         |                   |
|        |           |            | [phone] (Fax)   |                   |
+--------+-----------+------------+----------------------+-------------------+
| / | Surgery   | Radiology  |   Abebe Townsend, | CV EP PPM SYSTEM  |
|    |           |            |  MD  [address] | IMPLANT           |
|        |           |            |  St.  [place],   |                   |
|        |           |            | [place] [address]             |                   |
|        |           |            | [phone]         |                   |
|        |           |            | [phone] (Fax)   |                   |
+--------+-----------+------------+----------------------+-------------------+
 
| 02/10/ | Clinical  | Cardiology |                      |                   |
|    | Support   |            |                      |                   |
+--------+-----------+------------+----------------------+-------------------+
| / | Office    | Cardiology |   Tonia Wilson,    |                   |
|    | Visit     |            | ARNP  [address]   |                   |
|        |           |            | St  [place], [place]  |                   |
|        |           |            | [address]  [phone]  |                   |
|        |           |            |  [phone] (Fax)  |                   |
+--------+-----------+------------+----------------------+-------------------+
| / | Off-Site  | Nephrology |   Marzena Moser  |                   |
|    | Visit     |            | [place],   [address]      |                   |
|        |           |            | [address]      |                   |
|        |           |            | [place]      |                   |
|        |           |            | [address]  [phone]  |                   |
|        |           |            |  [phone] (Fax)  |                   |
+--------+-----------+------------+----------------------+-------------------+
 documented as of this encounter
 
 Procedures
 
 
+----------------+--------+-------------+----------------------+----------------------+
| Procedure Name | Priori | Date/Time   | Associated Diagnosis | Comments             |
|                | ty     |             |                      |                      |
+----------------+--------+-------------+----------------------+----------------------+
| ECG 12 LEAD    | Routin | 2018  |   Coronary artery    |   Results for this   |
|                | e      |  3:01 PM    | disease involving    | procedure are in the |
|                |        | PDT         | native coronary      |  results section.    |
|                |        |             | artery of native     |                      |
|                |        |             | heart without angina |                      |
|                |        |             |  pectoris            |                      |
+----------------+--------+-------------+----------------------+----------------------+
 documented in this encounter
 
 Results
 T4, Free (2018  4:00 PM PDT)
 
+-----------+-------+-----------------+-------------+--------------+
| Component | Value | Ref Range       | Performed   | Pathologist  |
|           |       |                 | At          | Signature    |
+-----------+-------+-----------------+-------------+--------------+
| FT4       | 0.9   | 0.6 - 1.1 ng/dL | [facility]  |              |
|           |       |                 | [facility]    |              |
|           |       |                 | [facility]     |              |
|           |       |                 | C[facility] -    |              |
|           |       |                 | LABORATORY  |              |
+-----------+-------+-----------------+-------------+--------------+
 
 
 
+----------+
| Specimen |
+----------+
| Blood    |
+----------+
 
 
 
+----------------------+--------------------+--------------------+----------------+
| Performing           | Address            | City/State/Zipcode | Phone Number   |
 
| Organization         |                    |                    |                |
+----------------------+--------------------+--------------------+----------------+
|   [facility]     |   [address] | [place], [place]    |   [phone] |
| [facility]  |                    | [address]              |                |
| - LABORATORY         |                    |                    |                |
+----------------------+--------------------+--------------------+----------------+
 Vitamin D, Deficiency Screen (25-Hydroxy) (2018  4:00 PM PDT)
 
+-------------+-------+---------------+-------------+--------------+
| Component   | Value | Ref Range     | Performed   | Pathologist  |
|             |       |               | At          | Signature    |
+-------------+-------+---------------+-------------+--------------+
| Vitamin D,  | 37    | 30 - 80 ng/mL | [facility]  |              |
| 25 Hydroxy  |       |               | [facility]    |              |
|             |       |               | [facility]     |              |
|             |       |               | CENTER -    |              |
|             |       |               | LABORATORY  |              |
+-------------+-------+---------------+-------------+--------------+
 
 
 
+----------+
| Specimen |
+----------+
| Blood    |
+----------+
 
 
 
+----------------------+--------------------+--------------------+----------------+
| Performing           | Address            | [facility] | Phone Number   |
| Organization         |                    |                    |                |
+----------------------+--------------------+--------------------+----------------+
|   [facility]     |   [address] | [place]    |   [phone] |
| [facility]  |                    | [address]              |                |
| - LABORATORY         |                    |                    |                |
+----------------------+--------------------+--------------------+----------------+
 TSH (2018  4:00 PM PDT)
 
+-----------+-------------------------+--------------+-------------+--------------+
| Component | Value                   | Ref Range    | Performed   | Pathologist  |
|           |                         |              | At          | Signature    |
+-----------+-------------------------+--------------+-------------+--------------+
| TSH       | 1.26Comment: This is a  | 0.45 - 5.33  | [facility]  |              |
|           | third generation TSH    | uIU/mL       | [facility]    |              |
|           | test.                   |              | MEDICAL     |              |
|           |                         |              | CENTER -    |              |
|           |                         |              | LABORATORY  |              |
+-----------+-------------------------+--------------+-------------+--------------+
 
 
 
+----------+
| Specimen |
+----------+
| Blood    |
+----------+
 
 
 
 
+----------------------+--------------------+--------------------+----------------+
| Performing           | Address            | City/State/Zipcode | Phone Number   |
| Organization         |                    |                    |                |
+----------------------+--------------------+--------------------+----------------+
|   [facility]     |   [address] | [place]    |   [phone] |
| [facility]  |                    | [address]              |                |
| - LABORATORY         |                    |                    |                |
+----------------------+--------------------+--------------------+----------------+
 Magnesium (2018  4:00 PM PDT)
 
+-----------+---------+-----------------+-------------+--------------+
| Component | Value   | Ref Range       | Performed   | Pathologist  |
|           |         |                 | At          | Signature    |
+-----------+---------+-----------------+-------------+--------------+
| Magnesium | 1.7 (L) | 1.8 - 2.5 mg/dL | [facility]  |              |
|           |         |                 | [facility]    |              |
|           |         |                 | MEDICAL     |              |
|           |         |                 | CENTER -    |              |
|           |         |                 | LABORATORY  |              |
+-----------+---------+-----------------+-------------+--------------+
 
 
 
+----------+
| Specimen |
+----------+
| Blood    |
+----------+
 
 
 
+----------------------+--------------------+--------------------+----------------+
| Performing           | Address            | City/State/Zipcode | Phone Number   |
| Organization         |                    |                    |                |
+----------------------+--------------------+--------------------+----------------+
|   [facility]     |   [address] | [place]    |   [phone] |
| [facility]  |                    | [address]              |                |
| - LABORATORY         |                    |                    |                |
+----------------------+--------------------+--------------------+----------------+
 Basic Metabolic Panel (2018  4:00 PM PDT)
 
+-------------+--------------------------+-----------------+-------------+--------------+
| Component   | Value                    | Ref Range       | Performed   | Pathologist  |
|             |                          |                 | At          | Signature    |
+-------------+--------------------------+-----------------+-------------+--------------+
| Na          | 140                      | 136 - 149       | [facility]  |              |
|             |                          | mmol/L          | [facility]    |              |
|             |                          |                 | MEDICAL     |              |
|             |                          |                 | CENTER -    |              |
|             |                          |                 | LABORATORY  |              |
+-------------+--------------------------+-----------------+-------------+--------------+
| K           | 5.3 (H)                  | 3.5 - 5.1       | [facility]  |              |
|             |                          | mmol/L          | [facility]    |              |
|             |                          |                 | MEDICAL     |              |
|             |                          |                 | CENTER -    |              |
|             |                          |                 | LABORATORY  |              |
+-------------+--------------------------+-----------------+-------------+--------------+
| Cl          | 110 (H)                  | 98 - 109 mmol/L | [facility]  |              |
|             |                          |                 | [facility]    |              |
|             |                          |                 | MEDICAL     |              |
 
|             |                          |                 | CENTER -    |              |
|             |                          |                 | LABORATORY  |              |
+-------------+--------------------------+-----------------+-------------+--------------+
| CO2         | 21 (L)                   | 24 - 31 mmol/L  | [facility]  |              |
|             |                          |                 | [facility]    |              |
|             |                          |                 | MEDICAL     |              |
|             |                          |                 | CENTER -    |              |
|             |                          |                 | LABORATORY  |              |
+-------------+--------------------------+-----------------+-------------+--------------+
| Anion Gap   | 9                        | 3 - 16 mmol/L   | [facility]  |              |
|             |                          |                 | [facility]    |              |
|             |                          |                 | MEDICAL     |              |
|             |                          |                 | CENTER -    |              |
|             |                          |                 | LABORATORY  |              |
+-------------+--------------------------+-----------------+-------------+--------------+
| Glucose     | 161 (H)                  | 70 - 109 mg/dL  | [facility]  |              |
|             |                          |                 | [facility]    |              |
|             |                          |                 | [facility]     |              |
|             |                          |                 | CENTER -    |              |
|             |                          |                 | LABORATORY  |              |
+-------------+--------------------------+-----------------+-------------+--------------+
| BUN         | 43 (H)                   | 7 - 18 mg/dL    | [facility]  |              |
|             |                          |                 | [facility]    |              |
|             |                          |                 | [facility]     |              |
|             |                          |                 | CENTER -    |              |
|             |                          |                 | LABORATORY  |              |
+-------------+--------------------------+-----------------+-------------+--------------+
| Creatinine  | 1.48 (H)                 | 0.60 - 1.30     | [facility]  |              |
|             |                          | mg/dL           |  [facility]    |              |
|             |                          |                 | [facility]     |              |
|             |                          |                 | C[facility] -    |              |
|             |                          |                 | LABORATORY  |              |
+-------------+--------------------------+-----------------+-------------+--------------+
| eGFR if not | 35 (L)Comment:           | >=60            | [facility]  |              |
|      | GLOMERULAR FILTRATION    | mL/min/1.73m2   | [facility]    |              |
| [employer]    | RATE,ESTIMATED           |                 | MEDICAL     |              |
|             |   mL/min/1.77k9Qlqo than |                 | CENTER -    |              |
|             |  60    Chronic kidney    |                 | LABORATORY  |              |
|             | disease,if found over a  |                 |             |              |
|             | 3-month period.Less than |                 |             |              |
|             |  15    Kidney failureFor |                 |             |              |
|             |                   |                 |             |              |
|             | Americans,multiply the   |                 |             |              |
|             | calculated GFR by 1.21.  |                 |             |              |
|             |                          |                 |             |              |
+-------------+--------------------------+-----------------+-------------+--------------+
| Calcium     | 9.8                      | 8.3 - 10.5      | [facility]  |              |
|             |                          | mg/dL           | [facility]    |              |
|             |                          |                 | MEDICAL     |              |
|             |                          |                 | CENTER -    |              |
|             |                          |                 | LABORATORY  |              |
+-------------+--------------------------+-----------------+-------------+--------------+
| BUN/Creatin | 29.1                     |                 | [facility]  |              |
| ine Ratio   |                          |                 | [facility]    |              |
|             |                          |                 | MEDICAL     |              |
|             |                          |                 | CENTER -    |              |
|             |                          |                 | LABORATORY  |              |
+-------------+--------------------------+-----------------+-------------+--------------+
 
 
 
 
+----------+
| Specimen |
+----------+
| Blood    |
+----------+
 
 
 
+----------------------+--------------------+--------------------+----------------+
| Performing           | Address            | City/State/Zipcode | Phone Number   |
| Organization         |                    |                    |                |
+----------------------+--------------------+--------------------+----------------+
|   [facility]     |   [address] | [place]    |   [phone] |
| [facility]  |                    | [address]              |                |
| - LABORATORY         |                    |                    |                |
+----------------------+--------------------+--------------------+----------------+
 ECG 12 lead (2018  3:01 PM PDT)
 
+-------------+--------------------------+-----------+------------+--------------+
| Component   | Value                    | Ref Range | Performed  | Pathologist  |
|             |                          |           | At         | Signature    |
+-------------+--------------------------+-----------+------------+--------------+
| VENTRICULAR | 70                       | BPM       | [facility]  |              |
|  RATE EKG   |                          |           |            |              |
+-------------+--------------------------+-----------+------------+--------------+
| ATRIAL RATE | 70                       | BPM       | [facility]  |              |
+-------------+--------------------------+-----------+------------+--------------+
| P-R         | 214                      | ms        | [facility]  |              |
| INTERVAL    |                          |           |            |              |
+-------------+--------------------------+-----------+------------+--------------+
| QRS         | 78                       | ms        | [facility]  |              |
| DURATION    |                          |           |            |              |
+-------------+--------------------------+-----------+------------+--------------+
| Q-T         | 392                      | ms        | [facility]  |              |
| INTERVAL    |                          |           |            |              |
+-------------+--------------------------+-----------+------------+--------------+
| Q-T         | 423                      | ms        | [facility]  |              |
| INTERVAL    |                          |           |            |              |
| (CORRECTED) |                          |           |            |              |
+-------------+--------------------------+-----------+------------+--------------+
| P WAVE AXIS | 88                       | degrees   | [facility]  |              |
+-------------+--------------------------+-----------+------------+--------------+
| QRS AXIS    | -48                      | degrees   | [facility]  |              |
+-------------+--------------------------+-----------+------------+--------------+
| T AXIS      | 24                       | degrees   | [facility]  |              |
+-------------+--------------------------+-----------+------------+--------------+
| INTERPRETAT | Sinus rhythm with 1st    |           | [facility]  |              |
| ION TEXT    | degree AV block with     |           |            |              |
|             | frequent premature       |           |            |              |
|             | ventricular              |           |            |              |
|             | complexesLeft axis       |           |            |              |
|             | deviationPulmonary       |           |            |              |
|             | disease                  |           |            |              |
|             | patternNonspecific ST    |           |            |              |
|             | abnormalityAbnormal      |           |            |              |
|             | ECGWhen compared with    |           |            |              |
|             | ECG of 2017       |           |            |              |
|             | 14:19,premature          |           |            |              |
|             | ventricular complexes    |           |            |              |
 
|             | are now presentConfirmed |           |            |              |
|             |  by ABEBE TOWNSEND MD |           |            |              |
|             |  ([id number]) on 2018    |           |            |              |
|             | 3:58:44 PM               |           |            |              |
+-------------+--------------------------+-----------+------------+--------------+
 
 
 
+----------+
| Specimen |
+----------+
|          |
+----------+
 
 
 
+----------------------------------------------------------+--------------+
| Narrative                                                | Performed At |
+----------------------------------------------------------+--------------+
|   This result has an attachment that is not available.   |              |
+----------------------------------------------------------+--------------+
 
 
 
+--------------+---------+--------------------+--------------+
| Performing   | Address | City/State/Zipcode | Phone Number |
| Organization |         |                    |              |
+--------------+---------+--------------------+--------------+
|   [facility]  |         |                    |              |
+--------------+---------+--------------------+--------------+
 documented in this encounter
 
 Visit Diagnoses
 
 
+-------------------------------------------------------------------------------------+
| Diagnosis                                                                           |
+-------------------------------------------------------------------------------------+
|   Hypertension, essential - Primary  Unspecified essential hypertension             |
+-------------------------------------------------------------------------------------+
|   Coronary artery disease involving native coronary artery of native heart without  |
| angina pectoris                                                                     |
+-------------------------------------------------------------------------------------+
|   Mixed hyperlipidemia                                                              |
+-------------------------------------------------------------------------------------+
|   PVC (premature ventricular contraction)  Other premature beats                    |
+-------------------------------------------------------------------------------------+
|   Hypothyroidism, unspecified type                                                  |
+-------------------------------------------------------------------------------------+
|   Vitamin D deficiency  Unspecified vitamin D deficiency                            |
+-------------------------------------------------------------------------------------+
 documented in this encounter

## 2020-01-08 NOTE — XMS
Encounter Summary
  Created on: 2020
 
 Viviana Ricci
 External Reference #: [id number]
 : 47
 Sex: Female
 
 Demographics
 
 
+-----------------------+----------------------+
| Address               | [address]      |
|                       | [place]  [address] |
+-----------------------+----------------------+
| Home Phone            | [phone]      |
+-----------------------+----------------------+
| Preferred Language    | Unknown              |
+-----------------------+----------------------+
| Marital Status        | Single               |
+-----------------------+----------------------+
| Syn[employer] Affiliation | [id number]                 |
+-----------------------+----------------------+
| Race                  | Unknown              |
+-----------------------+----------------------+
| Ethnic Group          | Unknown              |
+-----------------------+----------------------+
 
 
 Author
 
 
+--------------+--------------------------------------------+
| Author       | [facility]  |
|              | and [facility]                                |
+--------------+--------------------------------------------+
| Organization | [facility]  |
|              | and [facility]                                |
+--------------+--------------------------------------------+
| Address      | Unknown                                    |
+--------------+--------------------------------------------+
| Phone        | Unavailable                                |
+--------------+--------------------------------------------+
 
 
 
 Support
 
 
+----------------+--------------+---------------------+-----------------+
| Name           | Relationship | Address             | Phone           |
+----------------+--------------+---------------------+-----------------+
| Ada/Ed Radhames | ECON         | [place]              | [phone] |
|                |              | [place]  |                 |
|                |              |  [address]              |                 |
+----------------+--------------+---------------------+-----------------+
 
 
 
 
 Care Team Providers
 
 
+-----------------------+------+-------------+
| Care Team Member Name | Role | Phone       |
+-----------------------+------+-------------+
 PCP  | Unavailable |
+-----------------------+------+-------------+
 
 
 
 Reason for Visit
 
 
+-------------------+----------+
| Reason            | Comments |
+-------------------+----------+
| Medication Refill |          |
+-------------------+----------+
 
 
 
 Encounter Details
 
 
+--------+--------+---------------------+----------------------+-------------------+
| Date   | Type   | Department          | Care Team            | Description       |
+--------+--------+---------------------+----------------------+-------------------+
| / | Refill |   [facility]         |   Marzena Moser  | Medication Refill |
|    |        | NEPHROLOGY  [address]   | M, DO  [address]      |                   |
|        |        | [address]   | [address]      |                   |
|        |        | [place], [place]     | [place], [place]      |                   |
|        |        | [address]          | [address]  [phone]  |                   |
|        |        | [phone]        |  [phone] (Fax)  |                   |
+--------+--------+---------------------+----------------------+-------------------+
 
 
 
 Social History
 
 
+--------------+-------+-----------+--------+------+
| Tobacco Use  | Types | Packs/Day | Years  | Date |
|              |       |           | Used   |      |
+--------------+-------+-----------+--------+------+
| Never Smoker |       |           |        |      |
+--------------+-------+-----------+--------+------+
 
 
 
+---------------------+---+---+---+
| Smokeless Tobacco:  |   |   |   |
| Never Used          |   |   |   |
+---------------------+---+---+---+
 
 
 
+---------------------------------------------------------------+
| Comments: some second hand smoke exposure, but fairly minimal |
 
+---------------------------------------------------------------+
 
 
 
+-------------+-------------+---------+----------+
| Alcohol Use | Drinks/Week | oz/Week | Comments |
+-------------+-------------+---------+----------+
| No          |             |         |          |
+-------------+-------------+---------+----------+
 
 
 
+------------------+---------------+
| Sex Assigned at  | Date Recorded |
| Birth            |               |
+------------------+---------------+
| Not on file      |               |
+------------------+---------------+
 
 
 
+----------------+-------------+-------------+
| Job Start Date | Occupation  | Industry    |
+----------------+-------------+-------------+
| Not on file    | Not on file | Not on file |
+----------------+-------------+-------------+
 
 
 
+----------------+--------------+------------+
| Travel History | Travel Start | Travel End |
+----------------+--------------+------------+
 
 
 
+-------------------------------------+
| No recent travel history available. |
+-------------------------------------+
 documented as of this encounter
 
 Plan of Treatment
 
 
+--------+-----------+------------+----------------------+-------------------+
| Date   | Type      | Specialty  | Care Team            | Description       |
+--------+-----------+------------+----------------------+-------------------+
| / | Office    | Cardiology |   HellalfredoTonia,    |                   |
|    | Visit     |            | ARNP  [address]   |                   |
|        |           |            | St  [place], [place]  |                   |
|        |           |            | [address]  [phone]  |                   |
|        |           |            |  [phone] (Fax)  |                   |
+--------+-----------+------------+----------------------+-------------------+
| / | Hospital  | Radiology  |   Popeye Townsend, |                   |
|    | Encounter |            |  MD  [address] |                   |
|        |           |            |  St.  [place],   |                   |
|        |           |            | [place] [address]             |                   |
|        |           |            | [phone]         |                   |
|        |           |            | [phone] (Fax)   |                   |
+--------+-----------+------------+----------------------+-------------------+
| / | Surgery   | Radiology  |   Popeye Townsend, | CV EP PPM SYSTEM  |
 
|    |           |            |  MD  [address] | IMPLANT           |
|        |           |            |  St.  [place],   |                   |
|        |           |            | [place] [address]             |                   |
|        |           |            | [phone]         |                   |
|        |           |            | [phone] (Fax)   |                   |
+--------+-----------+------------+----------------------+-------------------+
| 02/10/ | Clinical  | Cardiology |                      |                   |
|    | Support   |            |                      |                   |
+--------+-----------+------------+----------------------+-------------------+
| / | Office    | Cardiology |   Tonia Wilson,    |                   |
|    | Visit     |            | ARNP  [address]   |                   |
|        |           |            | [place]  |                   |
|        |           |            | [address]  [phone]  |                   |
|        |           |            |  [phone] (Fax)  |                   |
+--------+-----------+------------+----------------------+-------------------+
| / | Off-Site  | Nephrology |   Marzena Moser  |                   |
|    | Visit     |            | DO ETIENNE  [address] [place] [address]      |                   |
|        |           |            | [address]      |                   |
|        |           |            | [place]      |                   |
|        |           |            | [address]  [phone]  |                   |
|        |           |            |  [phone] (Fax)  |                   |
+--------+-----------+------------+----------------------+-------------------+
 documented as of this encounter
 
 Visit Diagnoses
 
 
+------------------------------------------------------------------------------------------+
| Diagnosis                                                                                |
+------------------------------------------------------------------------------------------+
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
| stage IV, or unspecified(403.90) - Primary  Unspecified hypertensive kidney disease with |
|  chronic kidney disease stage I through stage IV, or unspecified                         |
+------------------------------------------------------------------------------------------+
|   Complications of transplanted kidney                                                   |
+------------------------------------------------------------------------------------------+
|   DIABETES MELLITUS, TYPE II, UNCONTROLLED  Type II or unspecified type diabetes         |
| mellitus without mention of complication, uncontrolled                                   |
+------------------------------------------------------------------------------------------+
|   Hyperlipidemia  Other and unspecified hyperlipidemia                                   |
+------------------------------------------------------------------------------------------+
 documented in this encounter

## 2020-01-08 NOTE — XMS
Encounter Summary
  Created on: 2020
 
 Viviana Ricci
 External Reference #: [id number]
 : 47
 Sex: Female
 
 Demographics
 
 
+-----------------------+----------------------+
| Address               | [address]      |
|                       | [place]  [address] |
+-----------------------+----------------------+
| Home Phone            | [phone]      |
+-----------------------+----------------------+
| Preferred Language    | Unknown              |
+-----------------------+----------------------+
| Marital Status        | Single               |
+-----------------------+----------------------+
| [employer] Affiliation | [id number]                 |
+-----------------------+----------------------+
| Race                  | Unknown              |
+-----------------------+----------------------+
| Ethnic Group          | Unknown              |
+-----------------------+----------------------+
 
 
 Author
 
 
+--------------+--------------------------------------------+
| Author       | [facility]  |
|              | and [facility]                                |
+--------------+--------------------------------------------+
| Organization | [facility]  |
|              | and [facility]                                |
+--------------+--------------------------------------------+
| Address      | Unknown                                    |
+--------------+--------------------------------------------+
| Phone        | Unavailable                                |
+--------------+--------------------------------------------+
 
 
 
 Support
 
 
+----------------+--------------+---------------------+-----------------+
| Name           | Relationship | Address             | Phone           |
+----------------+--------------+---------------------+-----------------+
| Ada/Ed Radhames | ECON         | [address]              | [phone] |
|                |              | [place]  |                 |
|                |              |  [address]              |                 |
+----------------+--------------+---------------------+-----------------+
 
 
 
 
 Care Team Providers
 
 
+-----------------------+------+-------------+
| Care Team Member Name | Role | Phone       |
+-----------------------+------+-------------+
 PCP  | Unavailable |
+-----------------------+------+-------------+
 
 
 
 Reason for Visit
 
 
+-----------+----------+
| Reason    | Comments |
+-----------+----------+
| Follow-up |          |
+-----------+----------+
 
 
 
 Encounter Details
 
 
+--------+---------+----------------------+----------------+----------------------+
| Date   | Type    | Department           | Care Team      | Description          |
+--------+---------+----------------------+----------------+----------------------+
| / | Office  |   [facility] [place]          |   [place],  | Pulmonary            |
|    | Visit   | PULMONARY  [address]     | Nena GUSMAN MD  | hypertension         |
|        |         | [place]  [place], |                | (Primary Dx); JACK on |
|        |         |  [place] [address]       |                |  CPAP; Dyspnea;      |
|        |         | [phone]         |                | Pre-operative        |
|        |         |                      |                | respiratory          |
|        |         |                      |                | examination; PLMD    |
|        |         |                      |                | (periodic limb       |
|        |         |                      |                | movement disorder)   |
+--------+---------+----------------------+----------------+----------------------+
 
 
 
 Social History
 
 
+--------------+-------+-----------+--------+------+
| Tobacco Use  | Types | Packs/Day | Years  | Date |
|              |       |           | Used   |      |
+--------------+-------+-----------+--------+------+
| Never Smoker |       |           |        |      |
+--------------+-------+-----------+--------+------+
 
 
 
+---------------------+---+---+---+
| Smokeless Tobacco:  |   |   |   |
| Never Used          |   |   |   |
+---------------------+---+---+---+
 
 
 
 
+---------------------------------------------------------------+
| Comments: some second hand smoke exposure, but fairly minimal |
+---------------------------------------------------------------+
 
 
 
+-------------+-------------+---------+----------+
| Alcohol Use | Drinks/Week | oz/Week | Comments |
+-------------+-------------+---------+----------+
| No          |             |         |          |
+-------------+-------------+---------+----------+
 
 
 
+------------------+---------------+
| Sex Assigned at  | Date Recorded |
| Birth            |               |
+------------------+---------------+
| Not on file      |               |
+------------------+---------------+
 
 
 
+----------------+-------------+-------------+
| Job Start Date | Occupation  | Industry    |
+----------------+-------------+-------------+
| Not on file    | Not on file | Not on file |
+----------------+-------------+-------------+
 
 
 
+----------------+--------------+------------+
| Travel History | Travel Start | Travel End |
+----------------+--------------+------------+
 
 
 
+-------------------------------------+
| No recent travel history available. |
+-------------------------------------+
 documented as of this encounter
 
 Last Filed Vital Signs
 
 
+-------------------+----------------------+----------------------+----------+
| Vital Sign        | Reading              | Time Taken           | Comments |
+-------------------+----------------------+----------------------+----------+
| Blood Pressure    | 104/62               | 2014 12:53 PM  |          |
|                   |                      | PDT                  |          |
+-------------------+----------------------+----------------------+----------+
| Pulse             | 71                   | 2014 12:53 PM  |          |
|                   |                      | PDT                  |          |
+-------------------+----------------------+----------------------+----------+
| Temperature       | -                    | -                    |          |
+-------------------+----------------------+----------------------+----------+
| Respiratory Rate  | -                    | -                    |          |
+-------------------+----------------------+----------------------+----------+
| Oxygen Saturation | 95%                  | 2014 12:53 PM  |          |
 
|                   |                      | PDT                  |          |
+-------------------+----------------------+----------------------+----------+
| Inhaled Oxygen    | -                    | -                    |          |
| Concentration     |                      |                      |          |
+-------------------+----------------------+----------------------+----------+
| Weight            | 126 kg (277 lb 12.8  | 2014 12:53 PM  |          |
|                   | oz)                  | PDT                  |          |
+-------------------+----------------------+----------------------+----------+
| Height            | 167.6 cm (5' 6")     | 2014 12:53 PM  |          |
|                   |                      | PDT                  |          |
+-------------------+----------------------+----------------------+----------+
| Body Mass Index   | 44.84                | 2014 12:53 PM  |          |
|                   |                      | PDT                  |          |
+-------------------+----------------------+----------------------+----------+
 documented in this encounter
 
 Patient Instructions
 Patient Instructions Nena Boykin MD - 2014  1:45 PM PDTWork on getting us 
a CPAP download. If you continues to have issues, let us know, and we can talk to them.Elect
ronically signed by Nena Boykin MD at 2014  1:46 PM PDT
 documented in this encounter
 
 Progress Notes
 Nena Boykin MD - 2014  1:10 PM PDTFormatting of this note might be differe
nt from the original.
 
 Pulmonary Follow Up
 
 HPI 
  
 Viviana Ricci is a 68 y.o. female patient of Marzena Moser here today for pre operati
ve evaluation. 
 
 She is looking at possibly having knee surgery for advanced degenerative arthritis of the k
nees. 
 
 She notes that she has had pretty limited mobility due to her knees, and then back spasm. S
he gets very fatigued, and then gets short of breath.
 
 She notes that she gets short of breath if she carries the groceries in the house up the st
airs. Her typical day to day physical activity involves letting the dog out, answering the p
neo, and going to the bathroom. She notes that she does not get the bed made without having
 to sit down and rest. If she does even simple activity, she gets significant back spasm. Sh
e gets back spasm if she even sits for very long periods of time. She has back spasm if she 
gets up out of a chair. She has purchased some work out videos, to help her do core exercise
s. 
 
 She does not cough chronically, and does not produce mucous. 
 
 She does occasionally get hoarse for no logical reason. She does occasionally get hoarse fo
r no logical reason. She notes very little nasal drainage. She only has nasal drainage when 
she has a cough. 
 
 She has been wearing her CPAP machine every night. She was supposed to bring in a download 
today, but when she took the chip in the download only went through September. They gave her
 a new chip, but this also could not download from the machine. They think that the machine 
is not functioning properly to download. She has been wearing it for the entire time she sle
eps, unless she takes it off when she sleeps. She notes she continues to sleep an erratic sl
eep schedule. She did not feel that getting back on her CPAP made her less tired. She has an
 oxygen bleed in of 2L. 
 
 
 Past Medical History
 Past Medical History 
 Diagnosis Date 
   Kidney replaced by transplant  
   Complication of transplanted kidney  
   Coronary artery disease  
   s/p CABG , cardiac cath in  
   Pulmonary hypertension (HCC)  
   thought secondary to nocturnal hypoxemia 
   Obesity hypoventilation syndrome (HCC)  
   JACK (obstructive sleep apnea)  
   Diabetes mellitus, type 2 (HCC)  
   Secondary hyperparathyroidism (HCC)  
   Hypothyroidism  
   Hyperlipidemia  
   Chronic low back pain  
   Movement disorder  
   tremor of unclear etiology 
   DJD (degenerative joint disease)  
   s/p knee replacement 
   Humerus fracture  
   right supracondylar distal humerus fracture 
   Carpal tunnel syndrome  
   Anemia in chronic renal disease (HCC)  
   resolved after transplant 
   Infection with CMV (cytomegalovirus) (HCC)  
   complicating kidney transplant 
   FSGS (focal segmental glomerulosclerosis) ([facility])  
   ESRD (end stage renal disease) ([facility])  
   HTN (hypertension)  
  
  
 Past Surgical History
 Past Surgical History 
 Procedure Date 
   Cholecystectomy  
   Insert peritoneal catheter 1998 
   x 2 
   Kidney transplant 2000 
   Total knee arthroplasty  
   Right 
   Hysterectomy 2003 
   Abdominal exploration surgery 2006 
   Parathyroidectomy 2007 
   Carpal tunnel release  
   Coronary artery bypass graft 1998 
   3 
  
 
 Social History: 
 History 
 
 Social History 
   Marital Status: Single 
   Spouse Name: N/A 
   Number of Children: 0 
   Years of Education: N/A 
 
 Occupational History 
 
   .  Disabled 
   Retired 
 
 Social History Main Topics 
   Smoking status: Never Smoker  
   Smokeless tobacco: Never Used 
    Comment: some second hand smoke exposure, but fairly minimal 
   Alcohol Use: No 
   Drug Use: No 
   Sexually Active: None 
 
 Other Topics Concern 
   None 
 
 Social History Narrative 
  Exercise: NoneCaffeine: 1 soda dailyLiving situation: aloneHas a dog at home. No other ani
mal exposures. Had birds as a child. Grew up in [place], [place]. 
 
 Allergies:
 No Known Allergies  
 
 Medications:
 Outpatient Encounter Prescriptions as of 2014 
 Medication Sig Dispense Refill 
   allopurinol (ZYLOPRIM) 100 mg tablet Take 1 tablet by mouth Daily.  30 tablet  11 
   aspirin 81 MG EC tablet Take 81 mg by mouth Daily.     
   cholecalciferol (VITAMIN D-3) 2000 UNITS TABS Take 1,000 Units by mouth Three times a w
[place].     
   cinacalcet (SENSIPAR) 30 mg tablet Take 1 tablet by mouth Daily.  30 tablet  12 
   fludrocortisone (FLORINEF) 0.1 mg tablet Take 1 tablet by mouth Every other day.  45 ta
blet  2 
   fluticasone (FLOVENT HFA) 220 mcg/puff inhaler Inhale 1 puff into the lungs 2 times shante
ly. Rinse mouth after use.  1 Inhaler  11 
   furosemide (LASIX) 40 mg tablet Take 1 tablet by mouth Daily.  30 tablet  5 
   glucose blood VI test strips (ONE TOUCH ULTRA TEST) strip Check blood sugar before each
 meal and as directed  100 each  12 
   insulin glargine (LANTUS) 100 units/mL injection Inject 20 Units under the skin every m
orning.  10 mL  prn 
   insulin lispro (HUMALOG) 100 units/mL injection Inject subcutaneously before meals acco
rding to sliding scale  10 pen  5 
   Insulin Syringe-Needle U-100 (BD INSULIN SYRINGE ULTRAFINE) 31G X 5/16" 0.5 ML MISC Use
 before meals and as directed.  100 each  11 
   levothyroxine (LEVOTHROID) 50 mcg tablet Take 1 tablet by mouth Daily.  30 tablet  11 
   lisinopril (PRINIVIL,ZESTRIL) 30 MG tablet Take 1 tablet by mouth Daily.  90 tablet  3 
   loperamide (ANTI-DIARRHEAL) 2 mg capsule Take 2 mg by mouth Daily as needed.     
   magnesium oxide (MAG-OX) 400 mg tablet Take 1 tablet by mouth 2 times daily.  62 tablet
  12 
   metoprolol tartrate (LOPRESSOR) 25 mg tablet Take 1 tablet by mouth 2 times daily.  60 
tablet  11 
   mycophenolate (CELLCEPT) 250 mg capsule Take 250 mg by mouth 3 times daily.     
   omeprazole (PRILOSEC) 20 mg capsule Take one capsule by mouth once daily on an empty st
omach  90 capsule  3 
   predniSONE (DELTASONE) 5 mg tablet Take 1 tablet by mouth Daily.  90 tablet  3 
   Prenatal Multivit-Min-Fe-FA (PRENATAL VITAMINS) 0.8 MG TABS Take 0.8 mg by mouth Daily.
  30 each  11 
   Prenatal Vit-Fe Fumarate-FA (PNV PRENATAL PLUS MULTIVITAMIN) 27-1 MG TABS      
   Respiratory Therapy Supplies MISC Decrease CPAP to 12-18 cmH2O Diagnosis Code(s)327.23.
 Please send order to [facility].  1 each  0 
   rosuvastatin (CRESTOR) 20 mg tablet Take 1 tablet by mouth nightly.  30 tablet  11 
   tacrolimus (PROGRAF) 0.5 mg capsule TAD.     
 
   tacrolimus (PROGRAF) 1 mg capsule Take 1.5 mg by mouth 2 times daily.     
   valsartan (DIOVAN) 160 mg tablet Take 1 tablet by mouth Daily.  30 tablet  11 
 
 Review of Systems
 Constitutional:  Denies fever, chills, and sweats. She has lost 22 pounds in the last year.
  
 Sleep:  Using CPAP every night.  
 Eyes:  Denies vision change and eye irritation. 
 ENT:  Denies earache,  decreased hearing, nasal congestion, nosebleeds, sore throat, and ho
arseness. 
 Resp:   See HPI.  
 CV:  Denies chest pain, palpitations, syncope, and peripheral edema. 
 GI:  Denies  heartburn, nausea, vomiting, and abdominal pain. 
 : Denies difficulty emptying bladder. 
 Musculoskeletal: Notes a lot of knee pain, back spasm. Has not done physical therapy in 2 y
ears.  
 
 Objective 
 
 /62 | Pulse 71 | Ht 1.676 m (5' 6") | Wt 126.009 kg (277 lb 12.8 oz) | BMI 44.86 kg/m
2 | SpO2 95% RA
 
 General Appearance:  Alert, cooperative, no distress, appears stated age 
 Head:  Normocephalic, without obvious abnormality, atraumatic 
 Eyes:  PERRL, conjunctiva clear, no scleral icterus, EOM's intact 
 Ears:  Normal TM's, external auditory canals, normal acuity 
 Nose: Nares normal, septum midline, mucosa normal 
 Mouth: No oral lesions or exudate 
 Neck: Supple, symmetrical, no adenopathy 
 Lungs:   No accessory muscle use, breath sounds are clear to auscultation bilaterally, no w
heezes, crackles or rhonchi 
 Chest Wall:  No deformity 
 Heart:  Regular rate and rhythm, no murmur, rub or gallop 
 Abdomen:   Soft, non-tender, non-distended, obese 
 Extremities:  No cyanosis, clubbing, or edema 
 Pulses: Radial pulses 2+ and symmetric 
 Skin: Warm and dry 
 Lymph nodes: Cervical and supraclavicular nodes normal 
 
 Data:
 Pulmonary function tests were performed prior to clinic today and were reviewed and interpr
eted in clinic today.  Spirometry is consistent with normal physiology. Lung volume testing 
is consistent with suggests possible mild restrictive physiology physiology. Diffusion capac
ity is moderately reduced and is not corrected for measured hemoglobin. Spriometry has not c
hanged significantly since 2013. Since 2013, there has been a drop in FVC a
nd FEV1 that exceeds 10% (exact percent not calculated). DLCO has increased since that time.
 Neither DLCO value was corrected for a measured hemoglobin.
 
 Ambulating oximetry was done on 2013 and was reviewed and interpreted in clinic to
day. It shows she required 2 L to keep her saturation more than 88 %. On 2L, her saturation 
remained at %.
 
 Overnight oximetry was done on 2013 on CPAP and was reviewed and interpreted in c
shonda today. It shows she spent 55 minutes with a saturation less than 89%.
 
 Echocardiogram was performed on April 15, 2014 and results were reviewed in clinic today. I
t shows mild left atrial dilatation, normal LV size and function, grade 1 LV diastolic dysfu
nction, mild TR, mild AI, and normal right sided heart pressures. 
 
 Immunization History 
 
 Administered Date(s) Administered 
   INFLUENZA, PRESERVATIVE FREE IM 2012 
 
 Assessment  
 
 1. Pulmonary hypertension - Mild to moderate on right heart cath in , with a mean PA pr
essure of 36mmHg and a wedge of 18 mmHg. This has essentially resolved on follow up echocard
iogram. I suspect her biggest issue was nocturnal hypoxemia, which has been corrected with C
PAP and oxygen bleed in.  
 2. JACK on CPAP - Severe disease, with baseline AHI of 67.9, and desaturation to 59%. She re
ports good compliance with her CPAP. The main issue for her has been an erratic sleep schedu
le and she continues to feel she is not less tired wearing it. We did not get her download (
though she tried to get one done), so we need to make sure her machine is controlling her OS
A. If it is, I suspect the bigger issue is related to sleep habits or PLMD that has gone unt
reated. We can discuss more at further follow up, once we have seen her download and exclude
d uncontrolled apnea as cause.  
 3. Dyspnea - She has dyspnea when she does activity that exceeds her usual day to day activ
ity by a significant degree., To me this is not surprising given her relative baseline activ
ity level which she describes as walking to the bathroom. She needs to do more activity to i
mprove her functional status. PFTs continue to show no evidence of significant pulmonary dis
ease.  
 4. PLMD (periodic limb movement disorder)- Not on medication, and unclear if she ever had a
 ferritin done for follow up. She did have an arousal index of 30 on original sleep study in
 . We will need to explore further in the future as this will impair her sleep quality a
s well.  
 4. Pre-operative respiratory examination - Overall I think from a pulmonary stand point she
 is a reasonable candidate as long as her sleep apnea is well controlled and we would need a
 download to confirm this. She would need to use her CPAP clinton operatively. I also think inc
reased physical activity prior to sugrery would be of benefit and she is working towards thi
s.  
 
 Plan 
 1.Continue CPAP, and get download to trouble shoot issues. 
 2.We can address insomnia and PLMD at follow up appointments. 
 3.I would recommend increased physical activity and therapy pre operatively. 
 4. She would have to use her CPAP perioperatively, mobilize early, use IS frequently and ha
ve aggressive use of DVT prophylaxis given relative sedentary state.
 
 She was advised to call if new pulmonary symptoms were to develop.
 
 Return to clinic TBD based on download from CPAP, or sooner with concerns.
 
 CC: Marzena Moser DO
 
 Portions of this report were transcribed using voice recognition software.  Every effort wa
s made to ensure accuracy; however, inadvertent computerized transcription errors may be pre
sent.
 
 Electronically signed by: Nena Boykin MD 2014 13:10
 
 Electronically signed by Nena Boykin MD at 2014 11:54 AM PDTdocumented in t
his encounter
 
 Plan of Treatment
 
 
+--------+-----------+------------+----------------------+-------------------+
| Date   | Type      | Specialty  | Care Team            | Description       |
+--------+-----------+------------+----------------------+-------------------+
| / | Office    | Cardiology |   Tonia Wilson,    |                   |
 
2020   | Visit     |            | ARNP  [address]   |                   |
|        |           |            | [place], [place]  |                   |
|        |           |            | [address] [phone]  |                   |
|        |           |            |  [phone] (Fax)  |                   |
+--------+-----------+------------+----------------------+-------------------+
| / | Hospital  | Radiology  |   Popeye Townsend, |                   |
2020   | Encounter |            |  MD  [address] |                   |
|        |           |            |  [facility]   |                   |
|        |           |            | [place] [address]             |                   |
|        |           |            | [phone]         |                   |
|        |           |            | [phone] (Fax)   |                   |
+--------+-----------+------------+----------------------+-------------------+
| / | Surgery   | Radiology  |   Popeye Townsend, | CV EP PPM SYSTEM  |
|    |           |            |  MD  [address] | IMPLANT           |
|        |           |            |  [place],   |                   |
|        |           |            | [place] [address]             |                   |
|        |           |            | [phone]         |                   |
|        |           |            | [phone] (Fax)   |                   |
+--------+-----------+------------+----------------------+-------------------+
| 02/10/ | Clinical  | Cardiology |                      |                   |
|    | Support   |            |                      |                   |
+--------+-----------+------------+----------------------+-------------------+
| / | Office    | Cardiology |   Tonia Wilson,    |                   |
|    | Visit     |            | ARNJESSICA  [address]   |                   |
|        |           |            | [place]  |                   |
|        |           |            | [address]  [phone]  |                   |
|        |           |            |  [phone] (Fax)  |                   |
+--------+-----------+------------+----------------------+-------------------+
| / | Off-Site  | Nephrology |   Marzena Moser  |                   |
|    | Visit     |            | DO ETIENNE  [address]      |                   |
|        |           |            | [address]      |                   |
|        |           |            | [place]      |                   |
|        |           |            | [address]  [phone]  |                   |
|        |           |            |  [phone] (Fax)  |                   |
+--------+-----------+------------+----------------------+-------------------+
 documented as of this encounter
 
 Visit Diagnoses
 
 
+----------------------------------------------------------------------------+
| Diagnosis                                                                  |
+----------------------------------------------------------------------------+
|   Pulmonary hypertension - Primary  Other chronic pulmonary heart diseases |
+----------------------------------------------------------------------------+
|   JACK on CPAP  Obstructive sleep apnea (adult) (pediatric)                 |
+----------------------------------------------------------------------------+
|   Dyspnea  Other dyspnea and respiratory abnormality                       |
+----------------------------------------------------------------------------+
|   Pre-operative respiratory examination                                    |
+----------------------------------------------------------------------------+
|   PLMD (periodic limb movement disorder)  Periodic limb movement disorder  |
+----------------------------------------------------------------------------+
 documented in this encounter

## 2020-01-08 NOTE — XMS
Encounter Summary
  Created on: 2020
 
 Viviana Ricci
 External Reference #: [id number]
 : 47
 Sex: Female
 
 Demographics
 
 
+-----------------------+----------------------+
| Address               | [address]      |
|                       | [place]  [address] |
+-----------------------+----------------------+
| Home Phone            | [phone]      |
+-----------------------+----------------------+
| Preferred Language    | Unknown              |
+-----------------------+----------------------+
| Marital Status        | Single               |
+-----------------------+----------------------+
| Caodaism Affiliation | [id number]                 |
+-----------------------+----------------------+
| Race                  | Unknown              |
+-----------------------+----------------------+
| Ethnic Group          | Unknown              |
+-----------------------+----------------------+
 
 
 Author
 
 
+--------------+--------------------------------------------+
| Author       | [facility]  |
|              | and [place]                                |
+--------------+--------------------------------------------+
| Organization | [facility]  |
|              | and [place]                                |
+--------------+--------------------------------------------+
| Address      | Unknown                                    |
+--------------+--------------------------------------------+
| Phone        | Unavailable                                |
+--------------+--------------------------------------------+
 
 
 
 Support
 
 
+----------------+--------------+---------------------+-----------------+
| Name           | Relationship | Address             | Phone           |
+----------------+--------------+---------------------+-----------------+
| Ada/Ed Radhames | ECON         | [address]              | [phone] |
|                |              | [place]  |                 |
|                |              |  [address]              |                 |
+----------------+--------------+---------------------+-----------------+
 
 
 
 
 Care Team Providers
 
 
+-----------------------+------+-------------+
| Care Team Member Name | Role | Phone       |
+-----------------------+------+-------------+
 PCP  | Unavailable |
+-----------------------+------+-------------+
 
 
 
 Encounter Details
 
 
+--------+----------+---------------------+----------------------+-------------+
| Date   | Type     | Department          | Care Team            | Description |
+--------+----------+---------------------+----------------------+-------------+
| 08/15/ | Abstract |   [facility] [place]         |   Marzena Moser  |             |
|    |          | NEPHROLOGY  [address]   | M, DO  [address]      |             |
|        |          | [address]   | [address]      |             |
|        |          | [place], [place]     | [place]      |             |
|        |          | [address]          | [address]  [phone]  |             |
|        |          | [phone]        |  [phone] (Fax)  |             |
+--------+----------+---------------------+----------------------+-------------+
 
 
 
 Social History
 
 
+--------------+-------+-----------+--------+------+
| Tobacco Use  | Types | Packs/Day | Years  | Date |
|              |       |           | Used   |      |
+--------------+-------+-----------+--------+------+
| Never Smoker |       |           |        |      |
+--------------+-------+-----------+--------+------+
 
 
 
+---------------------+---+---+---+
| Smokeless Tobacco:  |   |   |   |
| Never Used          |   |   |   |
+---------------------+---+---+---+
 
 
 
+---------------------------------------------------------------+
| Comments: some second hand smoke exposure, but fairly minimal |
+---------------------------------------------------------------+
 
 
 
+-------------+-------------+---------+----------+
| Alcohol Use | Drinks/Week | oz/Week | Comments |
+-------------+-------------+---------+----------+
| No          |             |         |          |
+-------------+-------------+---------+----------+
 
 
 
 
+------------------+---------------+
| Sex Assigned at  | Date Recorded |
| Birth            |               |
+------------------+---------------+
| Not on file      |               |
+------------------+---------------+
 
 
 
+----------------+-------------+-------------+
| Job Start Date | Occupation  | Industry    |
+----------------+-------------+-------------+
| Not on file    | Not on file | Not on file |
+----------------+-------------+-------------+
 
 
 
+----------------+--------------+------------+
| Travel History | Travel Start | Travel End |
+----------------+--------------+------------+
 
 
 
+-------------------------------------+
| No recent travel history available. |
+-------------------------------------+
 documented as of this encounter
 
 Plan of Treatment
 
 
+--------+-----------+------------+----------------------+-------------------+
| Date   | Type      | Specialty  | Care Team            | Description       |
+--------+-----------+------------+----------------------+-------------------+
| / | Office    | Cardiology |   Tonia Wilson,    |                   |
|    | Visit     |            | ARNJESSICA  [address]   |                   |
|        |           |            | [place]  |                   |
|        |           |            | [address]  [phone]  |                   |
|        |           |            |  [phone] (Fax)  |                   |
+--------+-----------+------------+----------------------+-------------------+
| / | Hospital  | Radiology  |   Popeye Townsend, |                   |
|    | Encounter |            |  MD  [address] |                   |
|        |           |            |  St.  [place],   |                   |
|        |           |            | [place] [address]             |                   |
|        |           |            | [phone]         |                   |
|        |           |            | [phone] (Fax)   |                   |
+--------+-----------+------------+----------------------+-------------------+
| / | Surgery   | Radiology  |   Popeye Townsend, | CV EP PPM SYSTEM  |
|    |           |            |  MD  [address] | IMPLANT           |
|        |           |            |  St.  [place],   |                   |
|        |           |            | [place] [address]             |                   |
|        |           |            | [phone]         |                   |
|        |           |            | [phone] (Fax)   |                   |
+--------+-----------+------------+----------------------+-------------------+
| 02/10/ | Clinical  | Cardiology |                      |                   |
|    | Support   |            |                      |                   |
+--------+-----------+------------+----------------------+-------------------+
| / | Office    | Cardiology |   Tonia Wilson,    |                   |
|    | Visit     |            | ARNP  [address]   |                   |
 
|        |           |            | St  [place], [place]  |                   |
|        |           |            | [address]  [phone]  |                   |
|        |           |            |  [phone] (Fax)  |                   |
+--------+-----------+------------+----------------------+-------------------+
| / | Off-Site  | Nephrology |   Marzena Moser  |                   |
|    | Visit     |            | DO ETIENNE  [address] [place] [address]      |                   |
|        |           |            | [address]      |                   |
|        |           |            | [place]      |                   |
|        |           |            | [address]  [phone]  |                   |
|        |           |            |  [phone] (Fax)  |                   |
+--------+-----------+------------+----------------------+-------------------+
 documented as of this encounter
 
 Procedures
 
 
+---------------------+--------+------------+----------------------+----------------------+
| Procedure Name      | Priori | Date/Time  | Associated Diagnosis | Comments             |
|                     | ty     |            |                      |                      |
+---------------------+--------+------------+----------------------+----------------------+
| EXTERNAL LAB: TSH   | Routin | 2016 |                      |   Results for this   |
|                     | e      |            |                      | procedure are in the |
|                     |        |            |                      |  results section.    |
+---------------------+--------+------------+----------------------+----------------------+
| TACROLIMUS ()  | Routin | 2016 |                      |   Results for this   |
| TROUGH              | e      |            |                      | procedure are in the |
|                     |        |            |                      |  results section.    |
+---------------------+--------+------------+----------------------+----------------------+
 documented in this encounter
 
 Results
 Tacrolimus () Trough (2016)
 
+-------------+-------+-----------+------------+--------------+
| Component   | Value | Ref Range | Performed  | Pathologist  |
|             |       |           | At         | Signature    |
+-------------+-------+-----------+------------+--------------+
| Tacrolimus  | 5.7   |           |            |              |
| Level       |       |           |            |              |
+-------------+-------+-----------+------------+--------------+
 
 
 
+-----------------+
| Specimen        |
+-----------------+
| Blood specimen  |
| (specimen)      |
+-----------------+
 External Lab: TSH (2016)
 
+-----------+-------+------------+------------+--------------+
| Component | Value | Ref Range  | Performed  | Pathologist  |
|           |       |            | At         | Signature    |
+-----------+-------+------------+------------+--------------+
| TSH,      | 1.64  | 0.27 - 4.2 | EXTERNAL   |              |
| External  |       |            | LAB        |              |
+-----------+-------+------------+------------+--------------+
 
 
 
 
+-----------------+
| Specimen        |
+-----------------+
| Blood specimen  |
| (specimen)      |
+-----------------+
 
 
 
+--------------------------+
| Resulting Agency Comment |
+--------------------------+
|   [facility]          |
+--------------------------+
 
 
 
+----------------+---------+--------------------+--------------+
| Performing     | Address | City/State/Zipcode | Phone Number |
| Organization   |         |                    |              |
+----------------+---------+--------------------+--------------+
|   EXTERNAL LAB |         |                    |              |
+----------------+---------+--------------------+--------------+
 documented in this encounter
 
 Visit Diagnoses
 Not on filedocumented in this encounter

## 2020-01-08 NOTE — XMS
Encounter Summary
  Created on: 2020
 
 Viviana Ricci
 External Reference #: [id number]
 : 47
 Sex: Female
 
 Demographics
 
 
+-----------------------+----------------------+
| Address               | [address]      |
|                       | [place]  [address] |
+-----------------------+----------------------+
| Home Phone            | [phone]      |
+-----------------------+----------------------+
| Preferred Language    | Unknown              |
+-----------------------+----------------------+
| Marital Status        | Single               |
+-----------------------+----------------------+
| Orthodox Affiliation | 1009                 |
+-----------------------+----------------------+
| Race                  | Unknown              |
+-----------------------+----------------------+
| Ethnic Group          | Unknown              |
+-----------------------+----------------------+
 
 
 Author
 
 
+--------------+--------------------------------------------+
| Author       | [facility]  |
|              | and [place]                                |
+--------------+--------------------------------------------+
| Organization | [facility]  |
|              | and [place]                                |
+--------------+--------------------------------------------+
| Address      | Unknown                                    |
+--------------+--------------------------------------------+
| Phone        | Unavailable                                |
+--------------+--------------------------------------------+
 
 
 
 Support
 
 
+----------------+--------------+---------------------+-----------------+
| Name           | Relationship | Address             | Phone           |
+----------------+--------------+---------------------+-----------------+
| Ada/Ed Radhames | ECON         | [address]              | [phone] |
|                |              | [place]  |                 |
|                |              |  [address]              |                 |
+----------------+--------------+---------------------+-----------------+
 
 
 
 
 Care Team Providers
 
 
+------------------------+------+-----------------+
| Care Team Member Name  | Role | Phone           |
+------------------------+------+-----------------+
| Marzena Moser DO | PCP  | [phone] |
+------------------------+------+-----------------+
 
 
 
 Encounter Details
 
 
+--------+----------+---------------------+----------------------+-------------+
| Date   | Type     | Department          | Care Team            | Description |
+--------+----------+---------------------+----------------------+-------------+
| / | Abstract |   [facility] [place]         |   Marzena Moser  |             |
|    |          | NEPHROLOGY  [address]   | DO ETIENNE  [address]      |             |
|        |          | [address]   | [address]      |             |
|        |          | [place], [place]     | [place], [place]      |             |
|        |          | [address]          | [address]  [phone]  |             |
|        |          | [phone]        |  [phone] (Fax)  |             |
+--------+----------+---------------------+----------------------+-------------+
 
 
 
 Social History
 
 
+--------------+-------+-----------+--------+------+
| Tobacco Use  | Types | Packs/Day | Years  | Date |
|              |       |           | Used   |      |
+--------------+-------+-----------+--------+------+
| Never Smoker |       |           |        |      |
+--------------+-------+-----------+--------+------+
 
 
 
+---------------------+---+---+---+
| Smokeless Tobacco:  |   |   |   |
| Never Used          |   |   |   |
+---------------------+---+---+---+
 
 
 
+---------------------------------------------------------------+
| Comments: some second hand smoke exposure, but fairly minimal |
+---------------------------------------------------------------+
 
 
 
+-------------+-------------+---------+----------+
| Alcohol Use | Drinks/Week | oz/Week | Comments |
+-------------+-------------+---------+----------+
| No          |             |         |          |
+-------------+-------------+---------+----------+
 
 
 
 
+------------------+---------------+
| Sex Assigned at  | Date Recorded |
| Birth            |               |
+------------------+---------------+
| Not on file      |               |
+------------------+---------------+
 
 
 
+----------------+-------------+-------------+
| Job Start Date | Occupation  | Industry    |
+----------------+-------------+-------------+
| Not on file    | Not on file | Not on file |
+----------------+-------------+-------------+
 
 
 
+----------------+--------------+------------+
| Travel History | Travel Start | Travel End |
+----------------+--------------+------------+
 
 
 
+-------------------------------------+
| No recent travel history available. |
+-------------------------------------+
 documented as of this encounter
 
 Plan of Treatment
 
 
+--------+-----------+------------+----------------------+-------------------+
| Date   | Type      | Specialty  | Care Team            | Description       |
+--------+-----------+------------+----------------------+-------------------+
| / | Office    | Cardiology |   Tonia Wilson,    |                   |
|    | Visit     |            | ARNP  [address]   |                   |
|        |           |            | St  [place] [facility] [place]  |                   |
|        |           |            | [address]  [phone]  |                   |
|        |           |            |  [phone] (Fax)  |                   |
+--------+-----------+------------+----------------------+-------------------+
| / | Hospital  | Radiology  |   Popeye Townsend, |                   |
|    | Encounter |            |  MD  [address] |                   |
|        |           |            |  St.  [place],   |                   |
|        |           |            | [place] [address]             |                   |
|        |           |            | [phone]         |                   |
|        |           |            | [phone] (Fax)   |                   |
+--------+-----------+------------+----------------------+-------------------+
| / | Surgery   | Radiology  |   Popeye Townsend, | CV EP PPM SYSTEM  |
|    |           |            |  MD  [address] | IMPLANT           |
|        |           |            |  St.  [place],   |                   |
|        |           |            | [place] [address]             |                   |
|        |           |            | [phone]         |                   |
|        |           |            | [phone] (Fax)   |                   |
+--------+-----------+------------+----------------------+-------------------+
| 02/10/ | Clinical  | Cardiology |                      |                   |
2020   | Support   |            |                      |                   |
+--------+-----------+------------+----------------------+-------------------+
| / | Office    | Cardiology |   Tonia Wilson,    |                   |
|    | Visit     |            | [facility]  [address]   |                   |
 
|        |           |            |   [place]  |                   |
|        |           |            | [address]  [phone]  |                   |
|        |           |            |  [phone] (Fax)  |                   |
+--------+-----------+------------+----------------------+-------------------+
| / | Off-Site  | Nephrology |   Marzena Moser  |                   |
2020   | Visit     |            | DO ETIENNE  [address]      |                   |
|        |           |            | [address]      |                   |
|        |           |            | [place]      |                   |
|        |           |            | [address]  [phone]  |                   |
|        |           |            |  [phone] (Fax)  |                   |
+--------+-----------+------------+----------------------+-------------------+
 documented as of this encounter
 
 Procedures
 
 
+--------------------+--------+------------+----------------------+----------------------+
| Procedure Name     | Priori | Date/Time  | Associated Diagnosis | Comments             |
|                    | ty     |            |                      |                      |
+--------------------+--------+------------+----------------------+----------------------+
| EXTERNAL LAB:      | Routin | 2018 |                      |   Results for this   |
| TACROLIMUS LEVEL,  | e      |            |                      | procedure are in the |
| [facility]               |        |            |                      |  results section.    |
+--------------------+--------+------------+----------------------+----------------------+
 documented in this encounter
 
 Results
 External Lab: Tacrolimus Level, [facility] (2018)
 
+-------------+-------+-----------+------------+--------------+
| Component   | Value | Ref Range | Performed  | Pathologist  |
|             |       |           | At         | Signature    |
+-------------+-------+-----------+------------+--------------+
| Tacrolimus, | 3.8   |           |            |              |
|  LC-MS/MS,  |       |           |            |              |
| External    |       |           |            |              |
+-------------+-------+-----------+------------+--------------+
 
 
 
+----------+
| Specimen |
+----------+
|          |
+----------+
 documented in this encounter
 
 Visit Diagnoses
 Not on filedocumented in this encounter

## 2020-01-08 NOTE — XMS
Encounter Summary
  Created on: 2020
 
 Viviana Ricci
 External Reference #: [id number]
 : 47
 Sex: Female
 
 Demographics
 
 
+-----------------------+----------------------+
| Address               | [address]      |
|                       | [place]  [address] |
+-----------------------+----------------------+
| Home Phone            | [phone]      |
+-----------------------+----------------------+
| Preferred Language    | Unknown              |
+-----------------------+----------------------+
| Marital Status        | Single               |
+-----------------------+----------------------+
| [employer] Affiliation | [id number]                 |
+-----------------------+----------------------+
| Race                  | Unknown              |
+-----------------------+----------------------+
| Ethnic Group          | Unknown              |
+-----------------------+----------------------+
 
 
 Author
 
 
+--------------+--------------------------------------------+
| Author       | [facility]  |
|              | and [facility]                                |
+--------------+--------------------------------------------+
| Organization | [facility]  |
|              | and [facility]                                |
+--------------+--------------------------------------------+
| Address      | Unknown                                    |
+--------------+--------------------------------------------+
| Phone        | Unavailable                                |
+--------------+--------------------------------------------+
 
 
 
 Support
 
 
+----------------+--------------+---------------------+-----------------+
| Name           | Relationship | Address             | Phone           |
+----------------+--------------+---------------------+-----------------+
| Ada/Ed Radhames | ECON         | [address]              | [phone] |
|                |              | [place]  |                 |
|                |              |  [address]              |                 |
+----------------+--------------+---------------------+-----------------+
 
 
 
 
 Care Team Providers
 
 
+------------------------+------+-----------------+
| Care Team Member Name  | Role | Phone           |
+------------------------+------+-----------------+
| Marzena Moser DO | PCP  | [phone] |
+------------------------+------+-----------------+
 
 
 
 Encounter Details
 
 
+--------+----------+---------------------+----------------------+-------------+
| Date   | Type     | Department          | Care Team            | Description |
+--------+----------+---------------------+----------------------+-------------+
| / | Abstract |   [facility]         |   Marzena Moser  |             |
|    |          | NEPHROLOGY  [address]   | [address]      |             |
|        |          | [address]   | [address]      |             |
|        |          | [place], [place]     | [place], [place]      |             |
|        |          | [address]          | [address]  [phone]  |             |
|        |          | [phone]        |  [phone] (Fax)  |             |
+--------+----------+---------------------+----------------------+-------------+
 
 
 
 Social History
 
 
+--------------+-------+-----------+--------+------+
| Tobacco Use  | Types | Packs/Day | Years  | Date |
|              |       |           | Used   |      |
+--------------+-------+-----------+--------+------+
| Never Smoker |       |           |        |      |
+--------------+-------+-----------+--------+------+
 
 
 
+---------------------+---+---+---+
| Smokeless Tobacco:  |   |   |   |
| Never Used          |   |   |   |
+---------------------+---+---+---+
 
 
 
+---------------------------------------------------------------+
| Comments: some second hand smoke exposure, but fairly minimal |
+---------------------------------------------------------------+
 
 
 
+-------------+-------------+---------+----------+
| Alcohol Use | Drinks/Week | oz/Week | Comments |
+-------------+-------------+---------+----------+
| No          |             |         |          |
+-------------+-------------+---------+----------+
 
 
 
 
+------------------+---------------+
| Sex Assigned at  | Date Recorded |
| Birth            |               |
+------------------+---------------+
| Not on file      |               |
+------------------+---------------+
 
 
 
+----------------+-------------+-------------+
| Job Start Date | Occupation  | Industry    |
+----------------+-------------+-------------+
| Not on file    | Not on file | Not on file |
+----------------+-------------+-------------+
 
 
 
+----------------+--------------+------------+
| Travel History | Travel Start | Travel End |
+----------------+--------------+------------+
 
 
 
+-------------------------------------+
| No recent travel history available. |
+-------------------------------------+
 documented as of this encounter
 
 Plan of Treatment
 
 
+--------+-----------+------------+----------------------+-------------------+
| Date   | Type      | Specialty  | Care Team            | Description       |
+--------+-----------+------------+----------------------+-------------------+
| / | Office    | Cardiology |   Tonia Wilson,    |                   |
|    | Visit     |            | ARNP  [address]   |                   |
|        |           |            | St  [place]  |                   |
|        |           |            | [address]  [phone]  |                   |
|        |           |            |  [phone] (Fax)  |                   |
+--------+-----------+------------+----------------------+-------------------+
| / | Hospital  | Radiology  |   Popeye Townsend, |                   |
|    | Encounter |            |  MD  [address] |                   |
|        |           |            |  St.  [place],   |                   |
|        |           |            | [place] [address]             |                   |
|        |           |            | [phone]         |                   |
|        |           |            | [phone] (Fax)   |                   |
+--------+-----------+------------+----------------------+-------------------+
| / | Surgery   | Radiology  |   Popeye Townsend, | CV EP PPM SYSTEM  |
|    |           |            |  MD  [address] | IMPLANT           |
|        |           |            |  St.  [place],   |                   |
|        |           |            | [place] [address]             |                   |
|        |           |            | [phone]         |                   |
|        |           |            | [phone] (Fax)   |                   |
+--------+-----------+------------+----------------------+-------------------+
| 02/10/ | Clinical  | Cardiology |                      |                   |
2020   | Support   |            |                      |                   |
+--------+-----------+------------+----------------------+-------------------+
| / | Office    | Cardiology |   Tonia Wilson,    |                   |
|    | Visit     |            | [facility]  [address]   |                   |
 
|        |           |            |   [place]  |                   |
|        |           |            | [address]  [phone]  |                   |
|        |           |            |  [phone] (Fax)  |                   |
+--------+-----------+------------+----------------------+-------------------+
| / | Off-Site  | Nephrology |   Marzena Moser  |                   |
2020   | Visit     |            | DO [facility]TI[facility]NE  [address]      |                   |
|        |           |            | [address]      |                   |
|        |           |            | [place]      |                   |
|        |           |            | [address]  [phone]  |                   |
|        |           |            |  [phone] (Fax)  |                   |
+--------+-----------+------------+----------------------+-------------------+
 documented as of this encounter
 
 Procedures
 
 
+----------------------+--------+------------+----------------------+----------------------+
| Procedure Name       | Priori | Date/Time  | Associated Diagnosis | Comments             |
|                      | ty     |            |                      |                      |
+----------------------+--------+------------+----------------------+----------------------+
| EXTERNAL LAB: BUN    | Routin | 2018 |                      |   Results for this   |
|                      | e      |            |                      | procedure are in the |
|                      |        |            |                      |  results section.    |
+----------------------+--------+------------+----------------------+----------------------+
| EXTERNAL LAB:        | Routin | 2018 |                      |   Results for this   |
| GLUCOSE              | e      |            |                      | procedure are in the |
|                      |        |            |                      |  results section.    |
+----------------------+--------+------------+----------------------+----------------------+
| EXTERNAL LAB:        | Routin | 2018 |                      |   Results for this   |
| TACROLIMUS LEVEL,    | e      |            |                      | procedure are in the |
| [facility]                 |        |            |                      |  results section.    |
+----------------------+--------+------------+----------------------+----------------------+
| EXTERNAL LAB: ALT    | Routin | 2018 |                      |   Results for this   |
|                      | e      |            |                      | procedure are in the |
|                      |        |            |                      |  results section.    |
+----------------------+--------+------------+----------------------+----------------------+
| EXTERNAL LAB: AST    | Routin | 2018 |                      |   Results for this   |
|                      | e      |            |                      | procedure are in the |
|                      |        |            |                      |  results section.    |
+----------------------+--------+------------+----------------------+----------------------+
| EXTERNAL LAB:        | Routin | 2018 |                      |   Results for this   |
| ALKALINE PHOSPHATASE | e      |            |                      | procedure are in the |
|                      |        |            |                      |  results section.    |
+----------------------+--------+------------+----------------------+----------------------+
| EXTERNAL LAB:        | Routin | 2018 |                      |   Results for this   |
| BILIRUBIN, TOTAL     | e      |            |                      | procedure are in the |
|                      |        |            |                      |  results section.    |
+----------------------+--------+------------+----------------------+----------------------+
| EXTERNAL LAB:        | Routin | 2018 |                      |   Results for this   |
| ALBUMIN              | e      |            |                      | procedure are in the |
|                      |        |            |                      |  results section.    |
+----------------------+--------+------------+----------------------+----------------------+
| EXTERNAL LAB:        | Routin | 2018 |                      |   Results for this   |
| PROTEIN, TOTAL       | e      |            |                      | procedure are in the |
|                      |        |            |                      |  results section.    |
+----------------------+--------+------------+----------------------+----------------------+
| EXTERNAL LAB:        | Routin | 2018 |                      |   Results for this   |
| PHOSPHORUS           | e      |            |                      | procedure are in the |
|                      |        |            |                      |  results section.    |
+----------------------+--------+------------+----------------------+----------------------+
 
| EXTERNAL LAB:        | Routin | 2018 |                      |   Results for this   |
| MAGNESIUM            | e      |            |                      | procedure are in the |
|                      |        |            |                      |  results section.    |
+----------------------+--------+------------+----------------------+----------------------+
| EXTERNAL LAB:        | Routin | 2018 |                      |   Results for this   |
| CALCIUM              | e      |            |                      | procedure are in the |
|                      |        |            |                      |  results section.    |
+----------------------+--------+------------+----------------------+----------------------+
| EXTERNAL LAB: CARBON | Routin | 2018 |                      |   Results for this   |
|  DIOXIDE             | e      |            |                      | procedure are in the |
|                      |        |            |                      |  results section.    |
+----------------------+--------+------------+----------------------+----------------------+
| EXTERNAL LAB:        | Routin | 2018 |                      |   Results for this   |
| CHLORIDE             | e      |            |                      | procedure are in the |
|                      |        |            |                      |  results section.    |
+----------------------+--------+------------+----------------------+----------------------+
| EXTERNAL LAB:        | Routin | 2018 |                      |   Results for this   |
| POTASSIUM            | e      |            |                      | procedure are in the |
|                      |        |            |                      |  results section.    |
+----------------------+--------+------------+----------------------+----------------------+
| EXTERNAL LAB: SODIUM | Routin | 2018 |                      |   Results for this   |
|                      | e      |            |                      | procedure are in the |
|                      |        |            |                      |  results section.    |
+----------------------+--------+------------+----------------------+----------------------+
| EXTERNAL LAB: CBC    | Routin | 2018 |                      |   Results for this   |
|                      | e      |            |                      | procedure are in the |
|                      |        |            |                      |  results section.    |
+----------------------+--------+------------+----------------------+----------------------+
| EXTERNAL LAB: TSH    | Routin | 2018 |                      |   Results for this   |
|                      | e      |            |                      | procedure are in the |
|                      |        |            |                      |  results section.    |
+----------------------+--------+------------+----------------------+----------------------+
| EXTERNAL LAB:        | Routin | 2018 |                      |   Results for this   |
| TRIGLYCERIDES        | e      |            |                      | procedure are in the |
|                      |        |            |                      |  results section.    |
+----------------------+--------+------------+----------------------+----------------------+
| EXTERNAL LAB:        | Routin | 2018 |                      |   Results for this   |
| CHOLESTEROL, HDL     | e      |            |                      | procedure are in the |
|                      |        |            |                      |  results section.    |
+----------------------+--------+------------+----------------------+----------------------+
| EXTERNAL LAB:        | Routin | 2018 |                      |   Results for this   |
| CHOLESTEROL, TOTAL   | e      |            |                      | procedure are in the |
|                      |        |            |                      |  results section.    |
+----------------------+--------+------------+----------------------+----------------------+
| EXTERNAL LAB:        | Routin | 2018 |                      |   Results for this   |
| CHOLESTEROL, LDL     | e      |            |                      | procedure are in the |
|                      |        |            |                      |  results section.    |
+----------------------+--------+------------+----------------------+----------------------+
| EXTERNAL LAB: EGFR   | Routin | 2018 |                      |   Results for this   |
|                      | e      |            |                      | procedure are in the |
|                      |        |            |                      |  results section.    |
+----------------------+--------+------------+----------------------+----------------------+
| EXTERNAL LAB:        | Routin | 2018 |                      |   Results for this   |
| CREATININE           | e      |            |                      | procedure are in the |
|                      |        |            |                      |  results section.    |
+----------------------+--------+------------+----------------------+----------------------+
| HEMOGLOBIN A1C       | Routin | 2018 |                      |   Results for this   |
|                      | e      |            |                      | procedure are in the |
|                      |        |            |                      |  results section.    |
+----------------------+--------+------------+----------------------+----------------------+
 
 documented in this encounter
 
 Results
 External Lab: TSH (2018)
 
+-----------+-------+-----------+------------+--------------+
| Component | Value | Ref Range | Performed  | Pathologist  |
|           |       |           | At         | Signature    |
+-----------+-------+-----------+------------+--------------+
| TSH,      | 1.19  |           |            |              |
| External  |       |           |            |              |
+-----------+-------+-----------+------------+--------------+
 
 
 
+----------+
| Specimen |
+----------+
| Blood    |
+----------+
 Hemoglobin A1C (2018)
 
+-------------+-------+-----------+------------+--------------+
| Component   | Value | Ref Range | Performed  | Pathologist  |
|             |       |           | At         | Signature    |
+-------------+-------+-----------+------------+--------------+
| Hemoglobin  | 7.0   | %         |            |              |
| A1c         |       |           |            |              |
+-------------+-------+-----------+------------+--------------+
 
 
 
+----------+
| Specimen |
+----------+
| Blood    |
+----------+
 External Lab: BUN (2018)
 
+-----------+-------+-----------+------------+--------------+
| Component | Value | Ref Range | Performed  | Pathologist  |
|           |       |           | At         | Signature    |
+-----------+-------+-----------+------------+--------------+
| BUN,      | 45    |           |            |              |
| External  |       |           |            |              |
+-----------+-------+-----------+------------+--------------+
 External Lab: Glucose (2018)
 
+-----------+-------+-----------+------------+--------------+
| Component | Value | Ref Range | Performed  | Pathologist  |
|           |       |           | At         | Signature    |
+-----------+-------+-----------+------------+--------------+
| Glucose,  | 98    |           |            |              |
| External  |       |           |            |              |
+-----------+-------+-----------+------------+--------------+
 External Lab: ALT (2018)
 
+-----------+-------+-----------+------------+--------------+
| Component | Value | Ref Range | Performed  | Pathologist  |
|           |       |           | At         | Signature    |
 
+-----------+-------+-----------+------------+--------------+
| ALT,      | 15    |           |            |              |
| External  |       |           |            |              |
+-----------+-------+-----------+------------+--------------+
 External Lab: AST (2018)
 
+-----------+-------+-----------+------------+--------------+
| Component | Value | Ref Range | Performed  | Pathologist  |
|           |       |           | At         | Signature    |
+-----------+-------+-----------+------------+--------------+
| AST,      | 23    |           |            |              |
| External  |       |           |            |              |
+-----------+-------+-----------+------------+--------------+
 External Lab: Alkaline Phosphatase (2018)
 
+-----------+-------+-----------+------------+--------------+
| Component | Value | Ref Range | Performed  | Pathologist  |
|           |       |           | At         | Signature    |
+-----------+-------+-----------+------------+--------------+
| ALP,      | 96    |           |            |              |
| External  |       |           |            |              |
+-----------+-------+-----------+------------+--------------+
 External Lab: Bilirubin, Total (2018)
 
+-------------+-------+-----------+------------+--------------+
| Component   | Value | Ref Range | Performed  | Pathologist  |
|             |       |           | At         | Signature    |
+-------------+-------+-----------+------------+--------------+
| Bilirubin,  | 0.6   |           |            |              |
| Total,      |       |           |            |              |
| External    |       |           |            |              |
+-------------+-------+-----------+------------+--------------+
 External Lab: Albumin (2018)
 
+-----------+-------+-----------+------------+--------------+
| Component | Value | Ref Range | Performed  | Pathologist  |
|           |       |           | At         | Signature    |
+-----------+-------+-----------+------------+--------------+
| Albumin,  | 3.8   |           |            |              |
| External  |       |           |            |              |
+-----------+-------+-----------+------------+--------------+
 External Lab: Protein, Total (2018)
 
+-----------+-------+-----------+------------+--------------+
| Component | Value | Ref Range | Performed  | Pathologist  |
|           |       |           | At         | Signature    |
+-----------+-------+-----------+------------+--------------+
| Protein,  | 6.1   |           |            |              |
| Total,    |       |           |            |              |
| External  |       |           |            |              |
+-----------+-------+-----------+------------+--------------+
 External Lab: Phosphorus (2018)
 
+-------------+-------+-----------+------------+--------------+
| Component   | Value | Ref Range | Performed  | Pathologist  |
|             |       |           | At         | Signature    |
+-------------+-------+-----------+------------+--------------+
| Phosphorus, | 3.1   |           |            |              |
|  External   |       |           |            |              |
+-------------+-------+-----------+------------+--------------+
 
 External Lab: Magnesium (2018)
 
+-------------+-------+-----------+------------+--------------+
| Component   | Value | Ref Range | Performed  | Pathologist  |
|             |       |           | At         | Signature    |
+-------------+-------+-----------+------------+--------------+
| Magnesium,  | 1.8   |           |            |              |
| External    |       |           |            |              |
+-------------+-------+-----------+------------+--------------+
 External Lab: Calcium (2018)
 
+-----------+-------+-----------+------------+--------------+
| Component | Value | Ref Range | Performed  | Pathologist  |
|           |       |           | At         | Signature    |
+-----------+-------+-----------+------------+--------------+
| Calcium,  | 10.4  |           |            |              |
| External  |       |           |            |              |
+-----------+-------+-----------+------------+--------------+
 External Lab: Carbon Dioxide (2018)
 
+-----------+-------+-----------+------------+--------------+
| Component | Value | Ref Range | Performed  | Pathologist  |
|           |       |           | At         | Signature    |
+-----------+-------+-----------+------------+--------------+
| Carbon    | 21    |           |            |              |
| Dioxide,  |       |           |            |              |
| External  |       |           |            |              |
+-----------+-------+-----------+------------+--------------+
 External Lab: Chloride (2018)
 
+------------+-------+-----------+------------+--------------+
| Component  | Value | Ref Range | Performed  | Pathologist  |
|            |       |           | At         | Signature    |
+------------+-------+-----------+------------+--------------+
| Chloride,  | 109   |           |            |              |
| External   |       |           |            |              |
+------------+-------+-----------+------------+--------------+
 External Lab: Potassium (2018)
 
+-------------+-------+-----------+------------+--------------+
| Component   | Value | Ref Range | Performed  | Pathologist  |
|             |       |           | At         | Signature    |
+-------------+-------+-----------+------------+--------------+
| Potassium,  | 4.5   |           |            |              |
| External    |       |           |            |              |
+-------------+-------+-----------+------------+--------------+
 External Lab: Sodium (2018)
 
+-----------+-------+-----------+------------+--------------+
| Component | Value | Ref Range | Performed  | Pathologist  |
|           |       |           | At         | Signature    |
+-----------+-------+-----------+------------+--------------+
| Sodium,   | 146   |           |            |              |
| External  |       |           |            |              |
+-----------+-------+-----------+------------+--------------+
 External Lab: CBC (2018)
 
+-----------+-------+-----------+------------+--------------+
| Component | Value | Ref Range | Performed  | Pathologist  |
|           |       |           | At         | Signature    |
 
+-----------+-------+-----------+------------+--------------+
| WBC,      | 6.1   |           |            |              |
| External  |       |           |            |              |
+-----------+-------+-----------+------------+--------------+
| HGB,      | 13.9  |           |            |              |
| External  |       |           |            |              |
+-----------+-------+-----------+------------+--------------+
| HCT,      | 41.8  |           |            |              |
| External  |       |           |            |              |
+-----------+-------+-----------+------------+--------------+
| PLT,      | 143   |           |            |              |
| External  |       |           |            |              |
+-----------+-------+-----------+------------+--------------+
| RBC,      | 4.03  |           |            |              |
| External  |       |           |            |              |
+-----------+-------+-----------+------------+--------------+
| MCV,      | 104   |           |            |              |
| External  |       |           |            |              |
+-----------+-------+-----------+------------+--------------+
| RDW,      | 14    |           |            |              |
| External  |       |           |            |              |
+-----------+-------+-----------+------------+--------------+
 External Lab: Triglycerides (2018)
 
+-------------+-------+-----------+------------+--------------+
| Component   | Value | Ref Range | Performed  | Pathologist  |
|             |       |           | At         | Signature    |
+-------------+-------+-----------+------------+--------------+
| Triglycerid | 203   |           |            |              |
| es,         |       |           |            |              |
| External    |       |           |            |              |
+-------------+-------+-----------+------------+--------------+
 
 
 
+----------+
| Specimen |
+----------+
| Blood    |
+----------+
 External Lab: Cholesterol, HDL (2018)
 
+-------------+-------+-----------+------------+--------------+
| Component   | Value | Ref Range | Performed  | Pathologist  |
|             |       |           | At         | Signature    |
+-------------+-------+-----------+------------+--------------+
| HDL         | 50.7  | mg/dl     |            |              |
| Cholesterol |       |           |            |              |
| , External  |       |           |            |              |
+-------------+-------+-----------+------------+--------------+
 
 
 
+----------+
| Specimen |
+----------+
| Blood    |
+----------+
 External Lab: Cholesterol, Total (2018)
 
 
+-------------+-------+-----------+------------+--------------+
| Component   | Value | Ref Range | Performed  | Pathologist  |
|             |       |           | At         | Signature    |
+-------------+-------+-----------+------------+--------------+
| Cholesterol | 142   | mg/dl     |            |              |
| , Total,    |       |           |            |              |
| External    |       |           |            |              |
+-------------+-------+-----------+------------+--------------+
 
 
 
+----------+
| Specimen |
+----------+
| Blood    |
+----------+
 External Lab: Cholesterol, LDL (2018)
 
+-------------+-------+-----------+------------+--------------+
| Component   | Value | Ref Range | Performed  | Pathologist  |
|             |       |           | At         | Signature    |
+-------------+-------+-----------+------------+--------------+
| LDL         | 51    |           |            |              |
| Cholesterol |       |           |            |              |
| , Direct,   |       |           |            |              |
| External    |       |           |            |              |
+-------------+-------+-----------+------------+--------------+
 
 
 
+----------+
| Specimen |
+----------+
| Blood    |
+----------+
 External Lab: eGFR (2018)
 
+-----------+-------+-----------+------------+--------------+
| Component | Value | Ref Range | Performed  | Pathologist  |
|           |       |           | At         | Signature    |
+-----------+-------+-----------+------------+--------------+
| eGFR,     | 39    |           |            |              |
| External  |       |           |            |              |
+-----------+-------+-----------+------------+--------------+
 
 
 
+----------+
| Specimen |
+----------+
| Blood    |
+----------+
 External Lab: Creatinine (2018)
 
+-------------+-------+-----------+------------+--------------+
| Component   | Value | Ref Range | Performed  | Pathologist  |
|             |       |           | At         | Signature    |
+-------------+-------+-----------+------------+--------------+
| Creatinine, | 1.35  |           |            |              |
|  External   |       |           |            |              |
 
+-------------+-------+-----------+------------+--------------+
 
 
 
+----------+
| Specimen |
+----------+
| Blood    |
+----------+
 External Lab: Tacrolimus Level, CMIA (2018)
 
+-------------+-------+-----------+------------+--------------+
| Component   | Value | Ref Range | Performed  | Pathologist  |
|             |       |           | At         | Signature    |
+-------------+-------+-----------+------------+--------------+
| Tacrolimus, | 7.8   |           |            |              |
|  CMIA,      |       |           |            |              |
| External    |       |           |            |              |
+-------------+-------+-----------+------------+--------------+
 
 
 
+----------+
| Specimen |
+----------+
|          |
+----------+
 documented in this encounter
 
 Visit Diagnoses
 Not on filedocumented in this encounter

## 2020-01-08 NOTE — XMS
Encounter Summary
  Created on: 2020
 
 Viviana Ricci
 External Reference #: [id number]
 : 47
 Sex: Female
 
 Demographics
 
 
+-----------------------+----------------------+
| Address               | [address]      |
|                       | [place]  [address] |
+-----------------------+----------------------+
| Home Phone            | [phone]      |
+-----------------------+----------------------+
| Preferred Language    | Unknown              |
+-----------------------+----------------------+
| Marital Status        | Single               |
+-----------------------+----------------------+
| Hindu Affiliation | [id number]                 |
+-----------------------+----------------------+
| Race                  | Unknown              |
+-----------------------+----------------------+
| Ethnic Group          | Unknown              |
+-----------------------+----------------------+
 
 
 Author
 
 
+--------------+--------------------------------------------+
| Author       | [facility]  |
|              | and [facility]                                |
+--------------+--------------------------------------------+
| Organization | [facility]  |
|              | and [facility]                                |
+--------------+--------------------------------------------+
| Address      | Unknown                                    |
+--------------+--------------------------------------------+
| Phone        | Unavailable                                |
+--------------+--------------------------------------------+
 
 
 
 Support
 
 
+----------------+--------------+---------------------+-----------------+
| Name           | Relationship | Address             | Phone           |
+----------------+--------------+---------------------+-----------------+
| Daa/Ed Radhames | ECON         | [address]              | [phone] |
|                |              | [place]  |                 |
|                |              |  [id number]              |                 |
+----------------+--------------+---------------------+-----------------+
 
 
 
 
 Care Team Providers
 
 
+------------------------+------+-----------------+
| Care Team Member Name  | Role | Phone           |
+------------------------+------+-----------------+
| Marzena Moser DO | PCP  | [phone] |
+------------------------+------+-----------------+
 
 
 
 Encounter Details
 
 
+--------+-----------+----------------------+----------------------+----------------------+
| Date   | Type      | Department           | Care Team            | Description          |
+--------+-----------+----------------------+----------------------+----------------------+
| 10/16/ | Hospital  |   [facility] |   Tonia Wilson,    | Coronary artery      |
| 2019   | Encounter |  [facility]     | ARNP  [address]   | disease involving    |
|        |           | MEDICINE  [address]      | [address]  [place], [place]  | native coronary      |
|        |           | [address] [place]  [place], | [address]  [phone]  | artery of native     |
|        |           |  [place] [address]       |  [phone] (Fax)  | heart without angina |
|        |           | [phone]         |                      |  pectoris;           |
|        |           |                      |                      | Hypertension,        |
|        |           |                      |                      | essential; Mixed     |
|        |           |                      |                      | hyperlipidemia; PVC  |
|        |           |                      |                      | (premature           |
|        |           |                      |                      | ventricular          |
|        |           |                      |                      | contraction);        |
|        |           |                      |                      | Permanent atrial     |
|        |           |                      |                      | fibrillation         |
+--------+-----------+----------------------+----------------------+----------------------+
 
 
 
 Social History
 
 
+--------------+-------+-----------+--------+------+
| Tobacco Use  | Types | Packs/Day | Years  | Date |
|              |       |           | Used   |      |
+--------------+-------+-----------+--------+------+
| Never Smoker |       |           |        |      |
+--------------+-------+-----------+--------+------+
 
 
 
+---------------------+---+---+---+
| Smokeless Tobacco:  |   |   |   |
| Never Used          |   |   |   |
+---------------------+---+---+---+
 
 
 
+---------------------------------------------------------------+
| Comments: some second hand smoke exposure, but fairly minimal |
+---------------------------------------------------------------+
 
 
 
 
+-------------+-------------+---------+----------+
| Alcohol Use | Drinks/Week | oz/Week | Comments |
+-------------+-------------+---------+----------+
| No          |             |         |          |
+-------------+-------------+---------+----------+
 
 
 
+------------------+---------------+
| Sex Assigned at  | Date Recorded |
| Birth            |               |
+------------------+---------------+
| Not on file      |               |
+------------------+---------------+
 
 
 
+----------------+-------------+-------------+
| Job Start Date | Occupation  | Industry    |
+----------------+-------------+-------------+
| Not on file    | Not on file | Not on file |
+----------------+-------------+-------------+
 
 
 
+----------------+--------------+------------+
| Travel History | Travel Start | Travel End |
+----------------+--------------+------------+
 
 
 
+-------------------------------------+
| No recent travel history available. |
+-------------------------------------+
 documented as of this encounter
 
 Medications at Time of Discharge
 
 
+----------------------+----------------------+-----------+---------+----------+-----------+
| Medication           | Sig                  | Dispensed | Refills | Start    | End Date  |
|                      |                      |           |         | Date     |           |
+----------------------+----------------------+-----------+---------+----------+-----------+
|   allopurinol        | take 1 tablet by     |   30      | 11      | 20 |           |
| (ZYLOPRIM) 100 mg    | mouth once daily     | tablet    |         | 18       |           |
| tablet               |                      |           |         |          |           |
+----------------------+----------------------+-----------+---------+----------+-----------+
|   aspirin 81 mg EC   | Take 81 mg by mouth  |           | 0       |          |           |
| tablet               | Daily.               |           |         |          |           |
+----------------------+----------------------+-----------+---------+----------+-----------+
|   B-D INS SYRINGE    | USE BEFORE MEALS AS  |   1 each  | 11      | 20 |           |
| 0.5CC/31GX5/16 31G X | DIRECTED             |           |         | 18       |           |
|  516" 0.5 ML MISC   |                      |           |         |          |           |
+----------------------+----------------------+-----------+---------+----------+-----------+
|   cholecalciferol    | Take 2,000 Units by  |           | 0       |          |           |
| (VITAMIN D-3) 2000   | mouth Every other    |           |         |          |           |
| UNITS                | day.                 |           |         |          |           |
| TABSIndications:     |                      |           |         |          |           |
| Unspecified          |                      |           |         |          |           |
 
| hypertensive kidney  |                      |           |         |          |           |
| disease with chronic |                      |           |         |          |           |
|  kidney disease      |                      |           |         |          |           |
| stage I through      |                      |           |         |          |           |
| stage IV, or         |                      |           |         |          |           |
| unspecified(403.90), |                      |           |         |          |           |
|  FSGS (focal         |                      |           |         |          |           |
| segmental            |                      |           |         |          |           |
| glomerulosclerosis), |                      |           |         |          |           |
|  Hyperlipidemia,     |                      |           |         |          |           |
| Complications of     |                      |           |         |          |           |
| transplanted kidney  |                      |           |         |          |           |
+----------------------+----------------------+-----------+---------+----------+-----------+
|   cinacalcet         | take 1 tablet by     |   90      | 3       | 20 |           |
| (SENSIPAR) 30 mg     | mouth once daily     | tablet    |         | 19       |           |
| tablet               |                      |           |         |          |           |
+----------------------+----------------------+-----------+---------+----------+-----------+
|   cyclobenzaprine    | Take 1 tablet by     |   45      | 0       | 20 |           |
| (FLEXERIL) 10 mg     | mouth Twice  daily   | tablet    |         | 19       |           |
| tablet               | as needed for Muscle |           |         |          |           |
|                      |  spasms.             |           |         |          |           |
+----------------------+----------------------+-----------+---------+----------+-----------+
|   fludrocortisone    | Take 1 tablet by     |   45      | 3       | 20 |           |
| (FLORINEF) 0.1 mg    | mouth Every other    | tablet    |         | 19       |           |
| tablet               | day.                 |           |         |          |           |
+----------------------+----------------------+-----------+---------+----------+-----------+
|   furosemide (LASIX) | Take 1 tablet by     |   30      | 11      | 20 |           |
|  40 mg               | mouth Daily as       | tablet    |         | 18       |           |
| tabletIndications:   | needed.              |           |         |          |           |
| Edema, unspecified   |                      |           |         |          |           |
| type, FSGS (focal    |                      |           |         |          |           |
| segmental            |                      |           |         |          |           |
| glomerulosclerosis), |                      |           |         |          |           |
|  Hyperlipidemia      |                      |           |         |          |           |
+----------------------+----------------------+-----------+---------+----------+-----------+
|   glucose blood VI   | Check blood sugar    |   100     | 12      | 20 |           |
| test strips (ONE     | before each meal and | each      |         | 12       |           |
| TOUCH ULTRA TEST)    |  as directed         |           |         |          |           |
| stripIndications:    |                      |           |         |          |           |
| Unspecified          |                      |           |         |          |           |
| hypertensive kidney  |                      |           |         |          |           |
| disease with chronic |                      |           |         |          |           |
|  kidney disease      |                      |           |         |          |           |
| stage I through      |                      |           |         |          |           |
| stage IV, or         |                      |           |         |          |           |
| unspecified(403.90), |                      |           |         |          |           |
|  Complications of    |                      |           |         |          |           |
| transplanted kidney, |                      |           |         |          |           |
|  Diabetes mellitus   |                      |           |         |          |           |
| type II,             |                      |           |         |          |           |
| uncontrolled (HCC),  |                      |           |         |          |           |
| Hyperlipidemia       |                      |           |         |          |           |
+----------------------+----------------------+-----------+---------+----------+-----------+
|   insulin glargine   | inject 20 units      |   10 vial | 11      | 20 |           |
| (LANTUS) 100         | subcutaneously every |           |         | 18       |           |
| units/mL injection   |  morning             |           |         |          |           |
| (vial)Indications:   |                      |           |         |          |           |
| Type 2 diabetes      |                      |           |         |          |           |
| mellitus with        |                      |           |         |          |           |
| chronic kidney       |                      |           |         |          |           |
 
| disease, without     |                      |           |         |          |           |
| long-term current    |                      |           |         |          |           |
| use of insulin,      |                      |           |         |          |           |
| unspecified CKD      |                      |           |         |          |           |
| stage (HCC), Kidney  |                      |           |         |          |           |
| replaced by          |                      |           |         |          |           |
| transplant           |                      |           |         |          |           |
+----------------------+----------------------+-----------+---------+----------+-----------+
|   insulin lispro     | inject               |   10 vial | 11      | 11/15/20 |           |
| (HUMALOG) 100        | subcutaneously       |           |         | 17       |           |
| units/mL injection   | BEFORE MEALS         |           |         |          |           |
| (vial)Indications:   | ACCORDING TO SLIDING |           |         |          |           |
| Type 2 diabetes      |  SCALE Max dose 20   |           |         |          |           |
| mellitus with        | units daily          |           |         |          |           |
| complication, with   |                      |           |         |          |           |
| long-term current    |                      |           |         |          |           |
| use of insulin (HCC) |                      |           |         |          |           |
+----------------------+----------------------+-----------+---------+----------+-----------+
|   levothyroxine      | take 1 tablet by     |   30      | 11      | 20 |           |
| (SYNTHROID) 50 mcg   | mouth once daily     | tablet    |         | 18       |           |
| tablet               |                      |           |         |          |           |
+----------------------+----------------------+-----------+---------+----------+-----------+
|   lisinopril         | take 1 tablet by     |   90      | 3       | 20 |           |
| (PRINIVIL,ZESTRIL)   | mouth once daily     | tablet    |         | 17       |           |
| 30 MG tablet         |                      |           |         |          |           |
+----------------------+----------------------+-----------+---------+----------+-----------+
|   loperamide         | Take 1 capsule by    |   60      | 5       | 20 |           |
| (ANTI-DIARRHEAL) 2   | mouth 4 times daily  | capsule   |         | 14       |           |
| mg                   | as needed.           |           |         |          |           |
| capsuleIndications:  |                      |           |         |          |           |
| Unspecified          |                      |           |         |          |           |
| hypertensive kidney  |                      |           |         |          |           |
| disease with chronic |                      |           |         |          |           |
|  kidney disease      |                      |           |         |          |           |
| stage I through      |                      |           |         |          |           |
| stage IV, or         |                      |           |         |          |           |
| unspecified(403.90), |                      |           |         |          |           |
|  FSGS (focal         |                      |           |         |          |           |
| segmental            |                      |           |         |          |           |
| glomerulosclerosis), |                      |           |         |          |           |
|  Hypothyroidism,     |                      |           |         |          |           |
| Hyperlipidemia       |                      |           |         |          |           |
+----------------------+----------------------+-----------+---------+----------+-----------+
|   losartan (COZAAR)  | take 1 tablet by     |   90      | 3       | 20 |           |
| 50 mg tablet         | mouth once daily     | tablet    |         | 18       |           |
+----------------------+----------------------+-----------+---------+----------+-----------+
|   magnesium oxide    | take 1 tablet by     |   60      | 11      | 10/02/20 |           |
| (MAG-OX) 400 mg      | mouth twice a day    | tablet    |         | 18       |           |
| tablet               |                      |           |         |          |           |
+----------------------+----------------------+-----------+---------+----------+-----------+
|   predniSONE         | take 1 tablet by     |   90      | 3       | 10/02/20 |           |
| (DELTASONE) 5 mg     | mouth once daily     | tablet    |         | 18       |           |
| tablet               |                      |           |         |          |           |
+----------------------+----------------------+-----------+---------+----------+-----------+
|   Prenatal Vit-Fe    | take 1 tablet by     |   30      | 11      | 20 |           |
| Fumarate-FA (PNV     | mouth once daily.    | tablet    |         | 18       |           |
| PRENATAL PLUS        |                      |           |         |          |           |
| MULTIVITAMIN) 27-1   |                      |           |         |          |           |
| MG TABS              |                      |           |         |          |           |
+----------------------+----------------------+-----------+---------+----------+-----------+
 
|   Respiratory        | Continue nocturnal   |   1 each  | 0       | 20 |           |
| Therapy Supplies     | O2 at 2 l/m through  |           |         | 18       |           |
| MISCIndications: JACK | CPAP for lifetime.   |           |         |          |           |
|  (obstructive sleep  | Dx: JACK G47.33       |           |         |          |           |
| apnea)               | Please provide new   |           |         |          |           |
|                      | nasal mask for CPAP  |           |         |          |           |
|                      | and any other needed |           |         |          |           |
|                      |  replacement         |           |         |          |           |
|                      | supplies.            |           |         |          |           |
+----------------------+----------------------+-----------+---------+----------+-----------+
|   Respiratory        | Decrease CPAP to     |   1 each  | 0       | 20 |           |
| Therapy Supplies     | 12-18 cmH2O          |           |         | 13       |           |
| MISC                 | Diagnosis            |           |         |          |           |
|                      | Code(s)327.23.       |           |         |          |           |
|                      | Please send order to |           |         |          |           |
|                      |  [facility].     |           |         |          |           |
+----------------------+----------------------+-----------+---------+----------+-----------+
|   rosuvastatin       | take 1 tablet by     |   30      | 11      | 20 |           |
| (CRESTOR) 20 mg      | mouth NIGHTLY        | tablet    |         | 18       |           |
| tablet               |                      |           |         |          |           |
+----------------------+----------------------+-----------+---------+----------+-----------+
|   fludrocortisone    | Take 1 tablet by     |           | 0       |          |  |
| (FLORINEF) 0.1 mg    | mouth Every other    |           |         |          | 9         |
| tablet               | day.                 |           |         |          |           |
+----------------------+----------------------+-----------+---------+----------+-----------+
|   metoprolol         | take 1 tablet by     |   60      | 11      | 20 |  |
| tartrate (LOPRESSOR) | mouth twice a day    | tablet    |         | 19       | 9         |
|  25 mg tablet        |                      |           |         |          |           |
+----------------------+----------------------+-----------+---------+----------+-----------+
|   mycophenolate      | Take 1 capsule by    |   60      | 11      | 20 |  |
| (CELLCEPT) 250 mg    | mouth 2 times daily. | capsule   |         | 18       | 9         |
| capsuleIndications:  |                      |           |         |          |           |
| Kidney replaced by   |                      |           |         |          |           |
| transplant           |                      |           |         |          |           |
+----------------------+----------------------+-----------+---------+----------+-----------+
|   QVAR REDIHALER 80  |                      |           | 0       | 20 |  |
| MCG/ACT inhaler      |                      |           |         | 18       | 9         |
+----------------------+----------------------+-----------+---------+----------+-----------+
|   tacrolimus         | Take 1 capsule by    |   240     | 0       | 20 |  |
| (PROGRAF) 1 mg       | mouth 2 times daily. | capsule   |         | 19       | 9         |
| capsuleIndications:  |  For a total dose of |           |         |          |           |
| Kidney replaced by   |  1 mg, by mouth, 2   |           |         |          |           |
| transplant           | times daily.         |           |         |          |           |
+----------------------+----------------------+-----------+---------+----------+-----------+
 documented as of this encounter
 
 Plan of Treatment
 
 
+--------+-----------+------------+----------------------+-------------------+
| Date   | Type      | Specialty  | Care Team            | Description       |
+--------+-----------+------------+----------------------+-------------------+
| / | Office    | Cardiology |   Tonia Wilson,    |                   |
|    | Visit     |            | ARNP  [address]   |                   |
|        |           |            | [place]  |                   |
|        |           |            | [address] [phone]  |                   |
|        |           |            |  [phone] (Fax)  |                   |
+--------+-----------+------------+----------------------+-------------------+
| / | Hospital  | Radiology  |   Popeye Townsend, |                   |
|    | Encounter |            |  MD  [address] |                   |
 
|        |           |            |  [place]   |                   |
|        |           |            | [place] [address]             |                   |
|        |           |            | [phone]         |                   |
|        |           |            | [phone] (Fax)   |                   |
+--------+-----------+------------+----------------------+-------------------+
| / | Surgery   | Radiology  |   Irineoyinjonas Yinirineo, | CV EP PPM SYSTEM  |
|    |           |            |  MD  [address] | IMPLANT           |
|        |           |            |  [place]   |                   |
|        |           |            | [place] [address]             |                   |
|        |           |            | [phone]         |                   |
|        |           |            | [phone] (Fax)   |                   |
+--------+-----------+------------+----------------------+-------------------+
| 02/10/ | Clinical  | Cardiology |                      |                   |
|    | Support   |            |                      |                   |
+--------+-----------+------------+----------------------+-------------------+
| / | Office    | Cardiology |   Tonia Wilson,    |                   |
|    | Visit     |            | BELKIS  [address]   |                   |
|        |           |            | [place]  |                   |
|        |           |            | [address]  [phone]  |                   |
|        |           |            |  [phone] (Fax)  |                   |
+--------+-----------+------------+----------------------+-------------------+
| / | Off-Site  | Nephrology |   Marzena Moser  |                   |
| 2020   | Visit     |            | M, DO  [address]      |                   |
|        |           |            | [address]      |                   |
|        |           |            | [place]      |                   |
|        |           |            | [address]  [phone]  |                   |
|        |           |            |  [phone] (Fax)  |                   |
+--------+-----------+------------+----------------------+-------------------+
 documented as of this encounter
 
 Procedures
 
 
+-----------------+--------+-------------+----------------------+----------------------+
| Procedure Name  | Priori | Date/Time   | Associated Diagnosis | Comments             |
|                 | ty     |             |                      |                      |
+-----------------+--------+-------------+----------------------+----------------------+
| MOBILE CARDIAC  | Routin | 2019  |   Coronary artery    |   Results for this   |
| TELEMETRY (MCT) | e      |  1:58 PM    | disease involving    | procedure are in the |
|                 |        | PST         | native coronary      |  results section.    |
|                 |        |             | artery of native     |                      |
|                 |        |             | heart without angina |                      |
|                 |        |             |  pectoris            |                      |
|                 |        |             | Hypertension,        |                      |
|                 |        |             | essential  Mixed     |                      |
|                 |        |             | hyperlipidemia  PVC  |                      |
|                 |        |             | (premature           |                      |
|                 |        |             | ventricular          |                      |
|                 |        |             | contraction)         |                      |
+-----------------+--------+-------------+----------------------+----------------------+
 documented in this encounter
 
 Results
 Mobile Cardiac Telemetry (2019  1:58 PM PST)
 
+------------------------------------------------------------------------+--------------+
| Narrative                                                              | Performed At |
+------------------------------------------------------------------------+--------------+
|   Popeey Townsend MD       2019 14:04  Mobile cardiac telemetry |   [facility]  |
|  from 10/16 until 10/30/2019.  Baseline EKG show atrial fibrillation   |              |
 
| with heart rate ranging   from 33 to 149 bpm.  PVCs, ventricular       |              |
| couplets, 4 beat run nonsustained ventricular   tachycardia was noted. |              |
|      Episodes of rapid ventricular response with heart rate of149 bpm  |              |
|   was noted.  Pauses up to 3.8-second were present.  Findings suggest  |              |
| potential tacky/bradycardia syndrome.                                  |              |
+------------------------------------------------------------------------+--------------+
 
 
 
+--------------+---------+--------------------+--------------+
| Performing   | Address | City/State/Zipcode | Phone Number |
| Organization |         |                    |              |
+--------------+---------+--------------------+--------------+
|   [facility]  |         |                    |              |
+--------------+---------+--------------------+--------------+
 documented in this encounter
 
 Visit Diagnoses
 
 
+-------------------------------------------------------------------------------------+
| Diagnosis                                                                           |
+-------------------------------------------------------------------------------------+
|   Coronary artery disease involving native coronary artery of native heart without  |
| angina pectoris                                                                     |
+-------------------------------------------------------------------------------------+
|   Hypertension, essential  Unspecified essential hypertension                       |
+-------------------------------------------------------------------------------------+
|   Mixed hyperlipidemia                                                              |
+-------------------------------------------------------------------------------------+
|   PVC (premature ventricular contraction)  Other premature beats                    |
+-------------------------------------------------------------------------------------+
|   Permanent atrial fibrillation  Atrial fibrillation                                |
+-------------------------------------------------------------------------------------+
 documented in this encounter

## 2020-01-08 NOTE — XMS
Encounter Summary
  Created on: 2020
 
 Viviana Ricci
 External Reference #: [id number]
 : 47
 Sex: Female
 
 Demographics
 
 
+-----------------------+----------------------+
| Address               | [address]      |
|                       | [place]  [address] |
+-----------------------+----------------------+
| Home Phone            | [phone]      |
+-----------------------+----------------------+
| Preferred Language    | Unknown              |
+-----------------------+----------------------+
| Marital Status        | Single               |
+-----------------------+----------------------+
| Jewish Affiliation | [id number]                 |
+-----------------------+----------------------+
| Race                  | Unknown              |
+-----------------------+----------------------+
| Ethnic Group          | Unknown              |
+-----------------------+----------------------+
 
 
 Author
 
 
+--------------+--------------------------------------------+
| Author       | [facility]  |
|              | and [facility]                                |
+--------------+--------------------------------------------+
| Organization | [facility]  |
|              | and [facility]                                |
+--------------+--------------------------------------------+
| Address      | Unknown                                    |
+--------------+--------------------------------------------+
| Phone        | Unavailable                                |
+--------------+--------------------------------------------+
 
 
 
 Support
 
 
+----------------+--------------+---------------------+-----------------+
| Name           | Relationship | Address             | Phone           |
+----------------+--------------+---------------------+-----------------+
| Ada/Ed Radhames | ECON         | [address]              | [phone] |
|                |              | [place]  |                 |
|                |              |  [id number]              |                 |
+----------------+--------------+---------------------+-----------------+
 
 
 
 
 Care Team Providers
 
 
+-----------------------+------+-------------+
| Care Team Member Name | Role | Phone       |
+-----------------------+------+-------------+
 PCP  | Unavailable |
+-----------------------+------+-------------+
 
 
 
 Reason for Visit
 Diagnostic/Screening (Routine)
 
+--------+--------+-----------+--------------+--------------+--------------+
| Status | Reason | Specialty | Diagnoses /  | Referred By  | Referred To  |
|        |        |           | Procedures   | Contact      | Contact      |
+--------+--------+-----------+--------------+--------------+--------------+
| Closed |        | Radiology |   Diagnoses  |   [facility]   |              |
|        |        |           |  Sprain of   | [address] |              |
|        |        |           | lumbar       |   [place]      |              |
|        |        |           | region,      | [place], [place]    |              |
|        |        |           | initial      | [address]   |              |
|        |        |           | encounter    | Phone:       |              |
|        |        |           | Lumbago      | [phone] |              |
|        |        |           | Procedures   |   Fax:       |              |
|        |        |           | MRI Lumbar   | [phone] |              |
|        |        |           | Spine wo     |              |              |
|        |        |           | Contrast     |              |              |
+--------+--------+-----------+--------------+--------------+--------------+
 
 
 
 
 Encounter Details
 
 
+--------+-----------+----------------------+----------------------+--------------------+
| Date   | Type      | Department           | Care Team            | Description        |
+--------+-----------+----------------------+----------------------+--------------------+
| 09/10/ | Hospital  |   [facility] |   Prem Chowdary,  | Sprain of lumbar   |
|    | Encounter |  [facility]  [address]  | [place]  [address]   | region, initial    |
|        |           | [address]  [place], | [place] [address]   | encounter; Lumbago |
|        |           |  [place] [address]       | [phone]         |                    |
|        |           | [phone]         | [phone] (Fax)   |                    |
+--------+-----------+----------------------+----------------------+--------------------+
 
 
 
 Social History
 
 
+--------------+-------+-----------+--------+------+
| Tobacco Use  | Types | Packs/Day | Years  | Date |
|              |       |           | Used   |      |
+--------------+-------+-----------+--------+------+
| Never Smoker |       |           |        |      |
+--------------+-------+-----------+--------+------+
 
 
 
 
+---------------------+---+---+---+
| Smokeless Tobacco:  |   |   |   |
| Never Used          |   |   |   |
+---------------------+---+---+---+
 
 
 
+---------------------------------------------------------------+
| Comments: some second hand smoke exposure, but fairly minimal |
+---------------------------------------------------------------+
 
 
 
+-------------+-------------+---------+----------+
| Alcohol Use | Drinks/Week | oz/Week | Comments |
+-------------+-------------+---------+----------+
| No          |             |         |          |
+-------------+-------------+---------+----------+
 
 
 
+------------------+---------------+
| Sex Assigned at  | Date Recorded |
| Birth            |               |
+------------------+---------------+
| Not on file      |               |
+------------------+---------------+
 
 
 
+----------------+-------------+-------------+
| Job Start Date | Occupation  | Industry    |
+----------------+-------------+-------------+
| Not on file    | Not on file | Not on file |
+----------------+-------------+-------------+
 
 
 
+----------------+--------------+------------+
| Travel History | Travel Start | Travel End |
+----------------+--------------+------------+
 
 
 
+-------------------------------------+
| No recent travel history available. |
+-------------------------------------+
 documented as of this encounter
 
 Medications at Time of Discharge
 
 
+----------------------+----------------------+-----------+---------+----------+-----------+
| Medication           | Sig                  | Dispensed | Refills | Start    | End Date  |
|                      |                      |           |         | Date     |           |
+----------------------+----------------------+-----------+---------+----------+-----------+
|   cholecalciferol    | Take 2,000 Units by  |           | 0       |          |           |
| (VITAMIN D-3) 2000   | mouth Every other    |           |         |          |           |
 
| UNITS                | day.                 |           |         |          |           |
| TABSIndications:     |                      |           |         |          |           |
| Unspecified          |                      |           |         |          |           |
| hypertensive kidney  |                      |           |         |          |           |
| disease with chronic |                      |           |         |          |           |
|  kidney disease      |                      |           |         |          |           |
| stage I through      |                      |           |         |          |           |
| stage IV, or         |                      |           |         |          |           |
| unspecified(403.90), |                      |           |         |          |           |
|  FSGS (focal         |                      |           |         |          |           |
| segmental            |                      |           |         |          |           |
| glomerulosclerosis), |                      |           |         |          |           |
|  Hyperlipidemia,     |                      |           |         |          |           |
| Complications of     |                      |           |         |          |           |
| transplanted kidney  |                      |           |         |          |           |
+----------------------+----------------------+-----------+---------+----------+-----------+
|   glucose blood VI   | Check blood sugar    |   100     | 12      | 20 |           |
| test strips (ONE     | before each meal and | each      |         | 12       |           |
| TOUCH ULTRA TEST)    |  as directed         |           |         |          |           |
| stripIndications:    |                      |           |         |          |           |
| Unspecified          |                      |           |         |          |           |
| hypertensive kidney  |                      |           |         |          |           |
| disease with chronic |                      |           |         |          |           |
|  kidney disease      |                      |           |         |          |           |
| stage I through      |                      |           |         |          |           |
| stage IV, or         |                      |           |         |          |           |
| unspecified(403.90), |                      |           |         |          |           |
|  Complications of    |                      |           |         |          |           |
| transplanted kidney, |                      |           |         |          |           |
|  Diabetes mellitus   |                      |           |         |          |           |
| type II,             |                      |           |         |          |           |
| uncontrolled (HCC),  |                      |           |         |          |           |
| Hyperlipidemia       |                      |           |         |          |           |
+----------------------+----------------------+-----------+---------+----------+-----------+
|   loperamide         | Take 1 capsule by    |   60      | 5       | 20 |           |
| (ANTI-DIARRHEAL) 2   | mouth 4 times daily  | capsule   |         | 14       |           |
| mg                   | as needed.           |           |         |          |           |
| capsuleIndications:  |                      |           |         |          |           |
| Unspecified          |                      |           |         |          |           |
| hypertensive kidney  |                      |           |         |          |           |
| disease with chronic |                      |           |         |          |           |
|  kidney disease      |                      |           |         |          |           |
| stage I through      |                      |           |         |          |           |
| stage IV, or         |                      |           |         |          |           |
| unspecified(403.90), |                      |           |         |          |           |
|  FSGS (focal         |                      |           |         |          |           |
| segmental            |                      |           |         |          |           |
| glomerulosclerosis), |                      |           |         |          |           |
|  Hypothyroidism,     |                      |           |         |          |           |
| Hyperlipidemia       |                      |           |         |          |           |
+----------------------+----------------------+-----------+---------+----------+-----------+
|   Respiratory        | Decrease CPAP to     |   1 each  | 0       | 20 |           |
| Therapy Supplies     | 12-18 cmH2O          |           |         | 13       |           |
| MISC                 | Diagnosis            |           |         |          |           |
|                      | Code(s)327.23.       |           |         |          |           |
|                      | Please send order to |           |         |          |           |
|                      |  [facility].     |           |         |          |           |
+----------------------+----------------------+-----------+---------+----------+-----------+
|   allopurinol        | Take 1 tablet by     |   30      | 11      | 02/10/20 |  |
| (ZYLOPRIM) 100 mg    | mouth Daily.         | tablet    |         | 14       | 5         |
 
| tablet               |                      |           |         |          |           |
+----------------------+----------------------+-----------+---------+----------+-----------+
|   aspirin 81 MG EC   | Take 81 mg by mouth  |           | 0       | 20 |  |
| tablet               | Daily.               |           |         | 12       | 5         |
+----------------------+----------------------+-----------+---------+----------+-----------+
|   cinacalcet         | Take 1 tablet by     |   30      | 12      | 20 |  |
| (SENSIPAR) 30 mg     | mouth Daily.         | tablet    |         | 13       | 4         |
| tablet               |                      |           |         |          |           |
+----------------------+----------------------+-----------+---------+----------+-----------+
|   fludrocortisone    | Take 1 tablet by     |   45      | 2       | 20 |  |
| (FLORINEF) 0.1 mg    | mouth Every other    | tablet    |         | 14       | 5         |
| tablet               | day.                 |           |         |          |           |
+----------------------+----------------------+-----------+---------+----------+-----------+
|   fluticasone        | Inhale 1 puff into   |   1       | 11      | 20 |  |
| (FLOVENT HFA) 220    | the lungs 2 times    | Inhaler   |         | 13       | 5         |
| mcg/puff inhaler     | daily. Rinse mouth   |           |         |          |           |
|                      | after use.           |           |         |          |           |
+----------------------+----------------------+-----------+---------+----------+-----------+
|   furosemide (LASIX) | Take 40 mg by mouth  |           | 0       | 20 |  |
|  40 mg               | Daily as needed.     |           |         | 14       | 5         |
| tabletIndications:   |                      |           |         |          |           |
| Unspecified          |                      |           |         |          |           |
| hypertensive kidney  |                      |           |         |          |           |
| disease with chronic |                      |           |         |          |           |
|  kidney disease      |                      |           |         |          |           |
| stage I through      |                      |           |         |          |           |
| stage IV, or         |                      |           |         |          |           |
| unspecified(403.90), |                      |           |         |          |           |
|  FSGS (focal         |                      |           |         |          |           |
| segmental            |                      |           |         |          |           |
| glomerulosclerosis), |                      |           |         |          |           |
|  Hypothyroidism,     |                      |           |         |          |           |
| Hyperlipidemia       |                      |           |         |          |           |
+----------------------+----------------------+-----------+---------+----------+-----------+
|   insulin glargine   | Inject 20 Units      |   10 mL   | 11      | 20 |  |
| (LANTUS) 100         | under the skin every |           |         | 14       | 5         |
| units/mL             |  morning.            |           |         |          |           |
| injectionIndications |                      |           |         |          |           |
| : Unspecified        |                      |           |         |          |           |
| hypertensive kidney  |                      |           |         |          |           |
| disease with chronic |                      |           |         |          |           |
|  kidney disease      |                      |           |         |          |           |
| stage I through      |                      |           |         |          |           |
| stage IV, or         |                      |           |         |          |           |
| unspecified(403.90), |                      |           |         |          |           |
|  Complications of    |                      |           |         |          |           |
| transplanted kidney, |                      |           |         |          |           |
|  Diabetes mellitus   |                      |           |         |          |           |
| type II,             |                      |           |         |          |           |
| uncontrolled (HCC),  |                      |           |         |          |           |
| Hyperlipidemia       |                      |           |         |          |           |
+----------------------+----------------------+-----------+---------+----------+-----------+
|   insulin lispro     | Inject               |   10 vial | 11      | 20 |  |
| (HUMALOG) 100        | subcutaneously       |           |         | 14       | 5         |
| units/mL injection   | before meals         |           |         |          |           |
|                      | according to sliding |           |         |          |           |
|                      |  scale               |           |         |          |           |
+----------------------+----------------------+-----------+---------+----------+-----------+
|   Insulin            | Use before meals and |   100     | 11      | 20 |  |
| Syringe-Needle U-100 |  as directed.        | each      |         | 14       | 5         |
 
|  (BD INSULIN SYRINGE |                      |           |         |          |           |
|  ULTRAFINE) 31G X    |                      |           |         |          |           |
| " 0.5 ML         |                      |           |         |          |           |
| MISCIndications:     |                      |           |         |          |           |
| Type II or           |                      |           |         |          |           |
| unspecified type     |                      |           |         |          |           |
| diabetes mellitus    |                      |           |         |          |           |
| without mention of   |                      |           |         |          |           |
| complication,        |                      |           |         |          |           |
| uncontrolled,        |                      |           |         |          |           |
| Insulin dependent    |                      |           |         |          |           |
| type 2 diabetes      |                      |           |         |          |           |
| mellitus,            |                      |           |         |          |           |
| uncontrolled (HCC)   |                      |           |         |          |           |
+----------------------+----------------------+-----------+---------+----------+-----------+
|   levothyroxine      | Take 1 tablet by     |   30      | 11      | 20 | 10/06/201 |
| (LEVOTHROID) 50 mcg  | mouth Daily.         | tablet    |         | 13       | 4         |
| tablet               |                      |           |         |          |           |
+----------------------+----------------------+-----------+---------+----------+-----------+
|   lisinopril         | Take 1 tablet by     |   90      | 3       | 20 |  |
| (PRINIVIL,ZESTRIL)   | mouth Daily.         | tablet    |         | 13       | 4         |
| 30 MG tablet         |                      |           |         |          |           |
+----------------------+----------------------+-----------+---------+----------+-----------+
|   magnesium oxide    | Take 1 tablet by     |   62      | 12      | 20 |  |
| (MAG-OX) 400 mg      | mouth 2 times daily. | tablet    |         | 14       | 5         |
| tablet               |                      |           |         |          |           |
+----------------------+----------------------+-----------+---------+----------+-----------+
|   metoprolol         | Take 1 tablet by     |   60      | 11      | 20 |  |
| tartrate (LOPRESSOR) | mouth 2 times daily. | tablet    |         | 14       | 5         |
|  25 mg tablet        |                      |           |         |          |           |
+----------------------+----------------------+-----------+---------+----------+-----------+
|   mycophenolate      | Take 250 mg by mouth |           | 0       | 20 | 10/14/201 |
| (CELLCEPT) 250 mg    |  3 times daily.      |           |         | 11       | 5         |
| capsule              |                      |           |         |          |           |
+----------------------+----------------------+-----------+---------+----------+-----------+
|   omeprazole         | Take one capsule by  |   90      | 3       | 20 |  |
| (PRILOSEC) 20 mg     | mouth once daily on  | capsule   |         | 14       | 5         |
| capsule              | an empty stomach     |           |         |          |           |
+----------------------+----------------------+-----------+---------+----------+-----------+
|   predniSONE         | Take 1 tablet by     |   90      | 3       | 20 |  |
| (DELTASONE) 5 mg     | mouth Daily.         | tablet    |         | 14       | 5         |
| tablet               |                      |           |         |          |           |
+----------------------+----------------------+-----------+---------+----------+-----------+
|   Prenatal           | Take 0.8 mg by mouth |   30 each | 11      | 20 |  |
| Multivit-Min-Fe-FA   |  Daily.              |           |         | 13       | 4         |
| (PRENATAL VITAMINS)  |                      |           |         |          |           |
| 0.8 MG TABS          |                      |           |         |          |           |
+----------------------+----------------------+-----------+---------+----------+-----------+
|   rosuvastatin       | Take 1 tablet by     |   30      | 11      | 20 |  |
| (CRESTOR) 20 mg      | mouth nightly.       | tablet    |         | 14       | 5         |
| tablet               |                      |           |         |          |           |
+----------------------+----------------------+-----------+---------+----------+-----------+
|   tacrolimus         | Take 1 capsule by    |   60      | 11      | 07/15/20 |  |
| (PROGRAF) 0.5 mg     | mouth 2 times daily. | capsule   |         | 14       | 6         |
| capsuleIndications:  |  With 1 mg to equal  |           |         |          |           |
| Unspecified          | 1.5 mg bid           |           |         |          |           |
| hypertensive kidney  |                      |           |         |          |           |
| disease with chronic |                      |           |         |          |           |
|  kidney disease      |                      |           |         |          |           |
| stage I through      |                      |           |         |          |           |
 
| stage IV, or         |                      |           |         |          |           |
| unspecified(403.90), |                      |           |         |          |           |
|  Complications of    |                      |           |         |          |           |
| transplanted kidney, |                      |           |         |          |           |
|  Diabetes mellitus   |                      |           |         |          |           |
| type II,             |                      |           |         |          |           |
| uncontrolled (HCC),  |                      |           |         |          |           |
| Hyperlipidemia       |                      |           |         |          |           |
+----------------------+----------------------+-----------+---------+----------+-----------+
|   tacrolimus         | Take 1 mg by mouth   |   60      | 11      | 20 |  |
| (PROGRAF) 1 mg       | twice daily with 0.5 | capsule   |         | 14       | 6         |
| capsuleIndications:  |  mg to equal 1.5 mg  |           |         |          |           |
| Unspecified          | twice daily          |           |         |          |           |
| hypertensive kidney  |                      |           |         |          |           |
| disease with chronic |                      |           |         |          |           |
|  kidney disease      |                      |           |         |          |           |
| stage I through      |                      |           |         |          |           |
| stage IV, or         |                      |           |         |          |           |
| unspecified(403.90), |                      |           |         |          |           |
|  FSGS (focal         |                      |           |         |          |           |
| segmental            |                      |           |         |          |           |
| glomerulosclerosis), |                      |           |         |          |           |
|  Hyperlipidemia,     |                      |           |         |          |           |
| Complications of     |                      |           |         |          |           |
| transplanted kidney  |                      |           |         |          |           |
+----------------------+----------------------+-----------+---------+----------+-----------+
|   valsartan (DIOVAN) | Take 1 tablet by     |   30      | 11      | 20 |  |
|  160 mg tablet       | mouth Daily.         | tablet    |         | 14       | 4         |
+----------------------+----------------------+-----------+---------+----------+-----------+
 documented as of this encounter
 
 Plan of Treatment
 
 
+--------+-----------+------------+----------------------+-------------------+
| Date   | Type      | Specialty  | Care Team            | Description       |
+--------+-----------+------------+----------------------+-------------------+
| / | Office    | Cardiology |   Tonia Wilson,    |                   |
|    | Visit     |            | ARNP  [address]   |                   |
|        |           |            | St  [place], [place]  |                   |
|        |           |            | [address]  [phone]  |                   |
|        |           |            |  [phone] (Fax)  |                   |
+--------+-----------+------------+----------------------+-------------------+
| / | Hospital  | Radiology  |   Cary Suwong, |                   |
|    | Encounter |            |  MD  [address] |                   |
|        |           |            |  St.  [place],   |                   |
|        |           |            | [place] [address]             |                   |
|        |           |            | [phone]         |                   |
|        |           |            | [phone] (Fax)   |                   |
+--------+-----------+------------+----------------------+-------------------+
| / | Surgery   | Radiology  |   Aimeechidi Yinpeeraul, | CV EP PPM SYSTEM  |
|    |           |            |  MD  [address] | IMPLANT           |
|        |           |            |  St.  [place],   |                   |
|        |           |            | [place] [address]             |                   |
|        |           |            | [phone]         |                   |
|        |           |            | [phone] (Fax)   |                   |
+--------+-----------+------------+----------------------+-------------------+
| 02/10/ | Clinical  | Cardiology |                      |                   |
|    | Support   |            |                      |                   |
+--------+-----------+------------+----------------------+-------------------+
 
| / | Office    | Cardiology |   Tonia Wilson,    |                   |
|    | Visit     |            | ARNP  [address]   |                   |
|        |           |            | St  [place], [place]  |                   |
|        |           |            | [address]  [phone]  |                   |
|        |           |            |  [phone] (Fax)  |                   |
+--------+-----------+------------+----------------------+-------------------+
| / | Off-Site  | Nephrology |   Marzena Moser  |                   |
|    | Visit     |            | MDO  [address]      |                   |
|        |           |            | [address]      |                   |
|        |           |            | [place]      |                   |
|        |           |            | [address]  [phone]  |                   |
|        |           |            |  [phone] (Fax)  |                   |
+--------+-----------+------------+----------------------+-------------------+
 documented as of this encounter
 
 Procedures
 
 
+----------------------+--------+-------------+----------------------+----------------------
+
| Procedure Name       | Priori | Date/Time   | Associated Diagnosis | Comments             
|
|                      | ty     |             |                      |                      
|
+----------------------+--------+-------------+----------------------+----------------------
+
| MRI LUMBAR SPINE WO  | Routin | 09/10/2014  |   Sprain of lumbar   |   Results for this   
|
| CONTRAST             | e      |  2:31 PM    | region, initial      | procedure are in the 
|
|                      |        | PDT         | encounter  Lumbago   |  results section.    
|
+----------------------+--------+-------------+----------------------+----------------------
+
 documented in this encounter
 
 Results
 MRI Lumbar Spine wo Contrast (09/10/2014  2:31 PM PDT)
 
+----------+
| Specimen |
+----------+
|          |
+----------+
 
 
 
+------------------------------------------------------------------------+---------------+
| Narrative                                                              | Performed At  |
+------------------------------------------------------------------------+---------------+
|   MRI LUMBAR SPINE WO CONTRAST 9/10/2014 2:31 PM      HISTORY: Lumbar  |   [facility] |
| sprain and lumbago.     COMPARISON: None.     PROTOCOL: Sagittal T2,   |               |
| sagittal T1, axial T2, axial T1, sagittal STIR, coronal  T2.           |               |
| FINDINGS:  There is mild levoscoliosis of the lumbar spine. A moderate |               |
|  to severe  compression fracture is visualized of T11 with no          |               |
| significant osseous edema,  likely chronic. Multilevel small Schmorl's |               |
|  nodes are present. Modic type II  changes are seen throughout the     |               |
| lower thoracic spine and lumbar spine. There is  mild anterolisthesis  |               |
| of L4 over L5.      Disc desiccation is visualized throughout the      |               |
| lower thoracic spine and lumbar  spine. Moderate disc narrowing is at  |               |
 
| L2-3. There is severe disc narrowing at  L3-4.      Imaged spinal cord |               |
|  and cauda equina demonstrate normal signal with no evidence  for      |               |
| myelomalacia or mass lesions. The conus medullaris terminates at level |               |
|  L1-2,  which is normal.     There is severe atrophy of the bilateral  |               |
| kidneys with presence of small cysts  demonstrate high T2 signal.      |               |
| Sagittal images demonstrate small posterior disc bulges of the lower   |               |
| thoracic  spine with no significant stenosis.     L1-2: A 2 mm         |               |
| posterior disc bulge is present along with mild facet hypertrophy  and |               |
|  ligamentum flavum hypertrophy. There is prominent posterior epidural  |               |
| fat. No  central canal stenosis is seen. Mild right neural foraminal   |               |
| canal stenosis is  observed.     L2-3: A 7 mm posterior disc bulge is  |               |
| present along with moderate facet  hypertrophy, ligamentum flavum      |               |
| hypertrophy, and prominent posterior epidural  fat. Overall, there is  |               |
| moderate to severe central stenosis. The AP dimension is  7 mm. Severe |               |
|  right and moderate left neural foraminal canal stenoses are seen.     |               |
| There is encroachment upon the right L2 nerve.     L3-4: A 2 mm        |               |
| posterior disc bulge is present with tearing of the annulus. There  is |               |
|  moderate facet hypertrophy and ligamentum flavum hypertrophy. No      |               |
| central  canal stenosis is seen. Mild right neural foraminal canal     |               |
| stenosis is observed.      L4-5: A 4 mm posterior disc bulge is        |               |
| present with tearing of the annulus. Severe  facet hypertrophy is      |               |
| seen. Overall, there is severe central canal stenosis. The  AP         |               |
| dimension is 5 mm. Mild right and moderate left neural foraminal canal |               |
|   stenoses are present with encroachment upon the left L4 nerve root.  |               |
|      L5-S1: A 3 mm posterior disc bulge is present along with moderate |               |
|  facet  hypertrophy. There is mild central canal stenosis. The AP      |               |
| dimension is 9 mm.  Mild bilateral neural foraminal canal stenoses are |               |
|  seen. There is encroachment  upon the right L5 nerve root.            |               |
| IMPRESSION -  L4-5: A 4 mm posterior disc bulge is present with        |               |
| tearing of the annulus. Severe  facet hypertrophy is seen. Overall,    |               |
| there is severe central canal stenosis. The  AP dimension is 5 mm.     |               |
| Mild right and moderate left neural foraminal canal  stenoses are      |               |
| present with encroachment upon the left L4 nerve root.      L2-3: A 7  |               |
| mm posterior disc bulge is present along with moderate facet           |               |
| hypertrophy, ligamentum flavum hypertrophy, and prominent posterior    |               |
| epidural  fat. Overall, there is moderate to severe central stenosis.  |               |
| The AP dimension is  7 mm. Severe right and moderate left neural       |               |
| foraminal canal stenoses are seen.  There is encroachment upon the     |               |
| right L2 nerve.     L5-S1: A 3 mm posterior disc bulge is present      |               |
| along with moderate facet  hypertrophy. There is mild central canal    |               |
| stenosis. The AP dimension is 9 mm.  Mild bilateral neural foraminal   |               |
| canal stenoses are seen. There is encroachment  upon the right L5      |               |
| nerve root.      Lesser degenerative changes as described above.       |               |
| Chronic moderate to severe compression fracture of T11.                |               |
|      Dictated and Signed by: Derek Reed MD    Electronically        |               |
| signed: 9/10/2014 3:35 PM                                              |               |
+------------------------------------------------------------------------+---------------+
 
 
 
+------------------------------------------------------------------------------------------+
| Procedure Note                                                                           |
+------------------------------------------------------------------------------------------+
|   Ralf, Rad Results In - 09/10/2014  3:38 PM PDT  MRI LUMBAR SPINE WO CONTRAST 9/10/2014  |
| 2:31 PM HISTORY: Lumbar sprain and lumbago.COMPARISON: None.PROTOCOL: Sagittal T2,       |
| sagittal T1, axial T2, axial T1, sagittal STIR, coronalT2.FINDINGS:There is mild         |
| levoscoliosis of the lumbar spine. A moderate to severecompression fracture is           |
| visualized of T11 with no significant osseous edema,likely chronic. Multilevel small     |
| Schmorl's nodes are present. Modic type IIchanges are seen throughout the lower thoracic |
|  spine and lumbar spine. There ismild anterolisthesis of L4 over L5. Disc desiccation is |
 
|  visualized throughout the lower thoracic spine and lumbarspine. Moderate disc narrowing |
|  is at L2-3. There is severe disc narrowing atL3-4. Imaged spinal cord and cauda equina  |
| demonstrate normal signal with no evidencefor myelomalacia or mass lesions. The conus    |
| medullaris terminates at level L1-2,which is normal.There is severe atrophy of the       |
| bilateral kidneys with presence of small cystsdemonstrate high T2 signal.Sagittal images |
|  demonstrate small posterior disc bulges of the lower thoracicspine with no significant  |
| stenosis.L1-2: A 2 mm posterior disc bulge is present along with mild facet              |
| hypertrophyand ligamentum flavum hypertrophy. There is prominent posterior epidural fat. |
|  Nocentral canal stenosis is seen. Mild right neural foraminal canal stenosis            |
| isobserved.L2-3: A 7 mm posterior disc bulge is present along with moderate              |
| facethypertrophy, ligamentum flavum hypertrophy, and prominent posterior epiduralfat.    |
| Overall, there is moderate to severe central stenosis. The AP dimension is7 mm. Severe   |
| right and moderate left neural foraminal canal stenoses are seen.There is encroachment   |
| upon the right L2 nerve.L3-4: A 2 mm posterior disc bulge is present with tearing of the |
|  annulus. Thereis moderate facet hypertrophy and ligamentum flavum hypertrophy. No       |
| centralcanal stenosis is seen. Mild right neural foraminal canal stenosis is observed.   |
| L4-5: A 4 mm posterior disc bulge is present with tearing of the annulus. Severefacet    |
| hypertrophy is seen. Overall, there is severe central canal stenosis. TheAP dimension is |
|  5 mm. Mild right and moderate left neural foraminal canalstenoses are present with      |
| encroachment upon the left L4 nerve root. L5-S1: A 3 mm posterior disc bulge is present  |
| along with moderate facethypertrophy. There is mild central canal stenosis. The AP       |
| dimension is 9 mm.Mild bilateral neural foraminal canal stenoses are seen. There is      |
| encroachmentupon the right L5 nerve root. IMPRESSION -L4-5: A 4 mm posterior disc bulge  |
| is present with tearing of the annulus. Severefacet hypertrophy is seen. Overall, there  |
| is severe central canal stenosis. TheAP dimension is 5 mm. Mild right and moderate left  |
| neural foraminal canalstenoses are present with encroachment upon the left L4 nerve      |
| root. L2-3: A 7 mm posterior disc bulge is present along with moderate facethypertrophy, |
|  ligamentum flavum hypertrophy, and prominent posterior epiduralfat. Overall, there is   |
| moderate to severe central stenosis. The AP dimension is7 mm. Severe right and moderate  |
| left neural foraminal canal stenoses are seen.There is encroachment upon the right L2    |
| nerve.L5-S1: A 3 mm posterior disc bulge is present along with moderate                  |
| facethypertrophy. There is mild central canal stenosis. The AP dimension is 9 mm.Mild    |
| bilateral neural foraminal canal stenoses are seen. There is encroachmentupon the right  |
| L5 nerve root. Lesser degenerative changes as described above.Chronic moderate to severe |
|  compression fracture of T11.Dictated and Signed by: Derek Reed MD  Electronically     |
| signed: 9/10/2014 3:35 PM                                                                |
|canal stenosis is seen. Mild right neural foraminal canal stenosis is observed.           |
|                                                                                          |
|L4-5: A 4 mm posterior disc bulge is present with tearing of the annulus. Severe          |
|facet hypertrophy is seen. Overall, there is severe central canal stenosis. The           |
|AP dimension is 5 mm. Mild right and moderate left neural foraminal canal                 |
|stenoses are present with encroachment upon the left L4 nerve root.                       |
|                                                                                          |
|L5-S1: A 3 mm posterior disc bulge is present along with moderate facet                   |
|hypertrophy. There is mild central canal stenosis. The AP dimension is 9 mm.             |
|Mild bilateral neural foraminal canal stenoses are seen. There is encroachment            |
|upon the right L5 nerve root.                                                             |
|                                                                                          |
|IMPRESSION -                                                                              |
|L4-5: A 4 mm posterior disc bulge is present with tearing of the annulus. Severe          |
|facet hypertrophy is seen. Overall, there is severe central canal stenosis. The           |
|AP dimension is 5 mm. Mild right and moderate left neural foraminal canal                 |
|stenoses are present with encroachment upon the left L4 nerve root.                       |
|                                                                                          |
|L2-3: A 7 mm posterior disc bulge is present along with moderate facet                    |
|hypertrophy, ligamentum flavum hypertrophy, and prominent posterior epidural              |
|fat. Overall, there is moderate to severe central stenosis. The AP dimension is          |
|7 mm. Severe right and moderate left neural foraminal canal stenoses are seen.            |
|There is encroachment upon the right L2 nerve.                                            |
|                                                                                          |
 
|L5-S1: A 3 mm posterior disc bulge is present along with moderate facet                   |
|hypertrophy. There is mild central canal stenosis. The AP dimension is 9 mm.             |
|Mild bilateral neural foraminal canal stenoses are seen. There is encroachment            |
|upon the right L5 nerve root.                                                             |
|                                                                                          |
|Lesser degenerative changes as described above.                                           |
|                                                                                          |
|Chronic moderate to severe compression fracture of T11.                                   |
|                                                                                          |
|Dictated and Signed by: Derek Reed MD                                                   |
| Electronically signed: 9/10/2014 3:35 PM                                                 |
+------------------------------------------------------------------------------------------+
 
 
 
+---------------+---------+--------------------+--------------+
| Performing    | Address | City/State/Zipcode | Phone Number |
| Organization  |         |                    |              |
+---------------+---------+--------------------+--------------+
|   [facility] |         |                    |              |
+---------------+---------+--------------------+--------------+
 documented in this encounter
 
 Visit Diagnoses
 
 
+----------------------------------------------+
| Diagnosis                                    |
+----------------------------------------------+
|   Sprain of lumbar region, initial encounter |
+----------------------------------------------+
|   Lumbago                                    |
+----------------------------------------------+
 documented in this encounter

## 2020-01-08 NOTE — XMS
Encounter Summary
  Created on: 2020
 
 Viviana Ricci
 External Reference #: [id number]
 : 47
 Sex: Female
 
 Demographics
 
 
+-----------------------+----------------------+
| Address               | [address]      |
|                       | [place]  [address] |
+-----------------------+----------------------+
| Home Phone            | [phone]      |
+-----------------------+----------------------+
| Preferred Language    | Unknown              |
+-----------------------+----------------------+
| Marital Status        | Single               |
+-----------------------+----------------------+
| Holiness Affiliation | [id number]                 |
+-----------------------+----------------------+
| Race                  | Unknown              |
+-----------------------+----------------------+
| Ethnic Group          | Unknown              |
+-----------------------+----------------------+
 
 
 Author
 
 
+--------------+--------------------------------------------+
| Author       | [facility]  |
|              | and [place]                                |
+--------------+--------------------------------------------+
| Organization | [facility]  |
|              | and [place]                                |
+--------------+--------------------------------------------+
| Address      | Unknown                                    |
+--------------+--------------------------------------------+
| Phone        | Unavailable                                |
+--------------+--------------------------------------------+
 
 
 
 Support
 
 
+----------------+--------------+---------------------+-----------------+
| Name           | Relationship | Address             | Phone           |
+----------------+--------------+---------------------+-----------------+
| Ada/Ed Radhames | ECON         | [address]              | [phone] |
|                |              | [place]  |                 |
|                |              |  [address]              |                 |
+----------------+--------------+---------------------+-----------------+
 
 
 
 
 Care Team Providers
 
 
+-----------------------+------+-------------+
| Care Team Member Name | Role | Phone       |
+-----------------------+------+-------------+
 PCP  | Unavailable |
+-----------------------+------+-------------+
 
 
 
 Encounter Details
 
 
+--------+----------+---------------------+----------------------+-------------+
| Date   | Type     | Department          | Care Team            | Description |
+--------+----------+---------------------+----------------------+-------------+
| 09/10/ | Abstract |   [facility]         |   Marzena Moser  |             |
|    |          | NEPHROLOGY  [address]   | M, DO  [address]      |             |
|        |          | [address]   | [address]      |             |
|        |          | [place], [place]     | [place]      |             |
|        |          | [address]          | [address]  [phone]  |             |
|        |          | [phone]        |  [phone] (Fax)  |             |
+--------+----------+---------------------+----------------------+-------------+
 
 
 
 Social History
 
 
+--------------+-------+-----------+--------+------+
| Tobacco Use  | Types | Packs/Day | Years  | Date |
|              |       |           | Used   |      |
+--------------+-------+-----------+--------+------+
| Never Smoker |       |           |        |      |
+--------------+-------+-----------+--------+------+
 
 
 
+---------------------+---+---+---+
| Smokeless Tobacco:  |   |   |   |
| Never Used          |   |   |   |
+---------------------+---+---+---+
 
 
 
+---------------------------------------------------------------+
| Comments: some second hand smoke exposure, but fairly minimal |
+---------------------------------------------------------------+
 
 
 
+-------------+-------------+---------+----------+
| Alcohol Use | Drinks/Week | oz/Week | Comments |
+-------------+-------------+---------+----------+
| No          |             |         |          |
+-------------+-------------+---------+----------+
 
 
 
 
+------------------+---------------+
| Sex Assigned at  | Date Recorded |
| Birth            |               |
+------------------+---------------+
| Not on file      |               |
+------------------+---------------+
 
 
 
+----------------+-------------+-------------+
| Job Start Date | Occupation  | Industry    |
+----------------+-------------+-------------+
| Not on file    | Not on file | Not on file |
+----------------+-------------+-------------+
 
 
 
+----------------+--------------+------------+
| Travel History | Travel Start | Travel End |
+----------------+--------------+------------+
 
 
 
+-------------------------------------+
| No recent travel history available. |
+-------------------------------------+
 documented as of this encounter
 
 Plan of Treatment
 
 
+--------+-----------+------------+----------------------+-------------------+
| Date   | Type      | Specialty  | Care Team            | Description       |
+--------+-----------+------------+----------------------+-------------------+
| / | Office    | Cardiology |   Tonia Wilson,    |                   |
|    | Visit     |            | ARNJESSICA  [address]   |                   |
|        |           |            | [place]  |                   |
|        |           |            | [address]  [phone]  |                   |
|        |           |            |  [phone] (Fax)  |                   |
+--------+-----------+------------+----------------------+-------------------+
| / | Hospital  | Radiology  |   Popeye Townsend, |                   |
|    | Encounter |            |  MD  [address] |                   |
|        |           |            |  St.  [place],   |                   |
|        |           |            | [place] [address]             |                   |
|        |           |            | [phone]         |                   |
|        |           |            | [phone] (Fax)   |                   |
+--------+-----------+------------+----------------------+-------------------+
| / | Surgery   | Radiology  |   Popeye Townsend, | CV EP PPM SYSTEM  |
|    |           |            |  MD  [address] | IMPLANT           |
|        |           |            |  St.  [place],   |                   |
|        |           |            | [place] [address]             |                   |
|        |           |            | [phone]         |                   |
|        |           |            | [phone] (Fax)   |                   |
+--------+-----------+------------+----------------------+-------------------+
| 02/10/ | Clinical  | Cardiology |                      |                   |
|    | Support   |            |                      |                   |
+--------+-----------+------------+----------------------+-------------------+
| / | Office    | Cardiology |   Tonia Wilson,    |                   |
|    | Visit     |            | ARNP  [address]   |                   |
 
|        |           |            | St  [place], [place]  |                   |
|        |           |            | [address]  [phone]  |                   |
|        |           |            |  [phone] (Fax)  |                   |
+--------+-----------+------------+----------------------+-------------------+
| / | Off-Site  | Nephrology |   Marzena Moser  |                   |
|    | Visit     |            | DO ETIENNE  [address] [place] [address]      |                   |
|        |           |            | [address]      |                   |
|        |           |            | [place]      |                   |
|        |           |            | [address]  [phone]  |                   |
|        |           |            |  [phone] (Fax)  |                   |
+--------+-----------+------------+----------------------+-------------------+
 documented as of this encounter
 
 Procedures
 
 
+----------------+--------+------------+----------------------+----------------------+
| Procedure Name | Priori | Date/Time  | Associated Diagnosis | Comments             |
|                | ty     |            |                      |                      |
+----------------+--------+------------+----------------------+----------------------+
| EXTERNAL LAB:  | Routin | 2015 |                      |   Results for this   |
| URINALYSIS     | e      |            |                      | procedure are in the |
|                |        |            |                      |  results section.    |
+----------------+--------+------------+----------------------+----------------------+
 documented in this encounter
 
 Results
 External Lab: Urinalysis (2015)
 
+-------------+----------+-----------+------------+--------------+
| Component   | Value    | Ref Range | Performed  | Pathologist  |
|             |          |           | At         | Signature    |
+-------------+----------+-----------+------------+--------------+
| UA Blood,   | negative |           | EXTERNAL   |              |
| External    |          |           | LAB        |              |
+-------------+----------+-----------+------------+--------------+
| UA Glucose, | normal   |           | EXTERNAL   |              |
|  External   |          |           | LAB        |              |
+-------------+----------+-----------+------------+--------------+
| UA Ketones, | negative |           | EXTERNAL   |              |
|  External   |          |           | LAB        |              |
+-------------+----------+-----------+------------+--------------+
| UA Ph,      | 7        |           | EXTERNAL   |              |
| External    |          |           | LAB        |              |
+-------------+----------+-----------+------------+--------------+
| UA          | negative |           | EXTERNAL   |              |
| Proteins,   |          |           | LAB        |              |
| External    |          |           |            |              |
+-------------+----------+-----------+------------+--------------+
| UA RBC,     | 0        |           | EXTERNAL   |              |
| External    |          |           | LAB        |              |
+-------------+----------+-----------+------------+--------------+
| UA Specific | 1.010    |           | EXTERNAL   |              |
|  Gravity,   |          |           | LAB        |              |
| External    |          |           |            |              |
+-------------+----------+-----------+------------+--------------+
| UA          | 25 (A)   | 0 - 0     | EXTERNAL   |              |
| Leukocyte   |          |           | LAB        |              |
| Esterase,   |          |           |            |              |
| External    |          |           |            |              |
 
+-------------+----------+-----------+------------+--------------+
 
 
 
+----------------+---------+--------------------+--------------+
| Performing     | Address | City/State/Zipcode | Phone Number |
| Organization   |         |                    |              |
+----------------+---------+--------------------+--------------+
|   EXTERNAL LAB |         |                    |              |
+----------------+---------+--------------------+--------------+
 documented in this encounter
 
 Visit Diagnoses
 Not on filedocumented in this encounter

## 2020-01-08 NOTE — XMS
Encounter Summary
  Created on: 2020
 
 Viviana Ricci
 External Reference #: [id number]
 : 47
 Sex: Female
 
 Demographics
 
 
+-----------------------+----------------------+
| Address               | [address]      |
|                       | [place]  [address] |
+-----------------------+----------------------+
| Home Phone            | [phone]      |
+-----------------------+----------------------+
| Preferred Language    | Unknown              |
+-----------------------+----------------------+
| Marital Status        | Single               |
+-----------------------+----------------------+
| Mosque Affiliation | 1009                 |
+-----------------------+----------------------+
| Race                  | Unknown              |
+-----------------------+----------------------+
| Ethnic Group          | Unknown              |
+-----------------------+----------------------+
 
 
 Author
 
 
+--------------+--------------------------------------------+
| Author       | [facility]  |
|              | and [place]                                |
+--------------+--------------------------------------------+
| Organization | [facility]  |
|              | and [place]                                |
+--------------+--------------------------------------------+
| Address      | Unknown                                    |
+--------------+--------------------------------------------+
| Phone        | Unavailable                                |
+--------------+--------------------------------------------+
 
 
 
 Support
 
 
+----------------+--------------+---------------------+-----------------+
| Name           | Relationship | Address             | Phone           |
+----------------+--------------+---------------------+-----------------+
| Ada/Ed Radhames | ECON         | [address]              | [phone] |
|                |              | [place], [place]  |                 |
|                |              |  [address]              |                 |
+----------------+--------------+---------------------+-----------------+
 
 
 
 
 Care Team Providers
 
 
+-----------------------+------+-------------+
| Care Team Member Name | Role | Phone       |
+-----------------------+------+-------------+
 PCP  | Unavailable |
+-----------------------+------+-------------+
 
 
 
 Reason for Referral
 Evaluate & Treat (Routine)
 
+--------+--------------+-----------+--------------+--------------+---------------+
| Status | Reason       | Specialty | Diagnoses /  | Referred By  | Referred To   |
|        |              |           | Procedures   | Contact      | Contact       |
+--------+--------------+-----------+--------------+--------------+---------------+
| Closed |   Specialty  | Vascular  |   Diagnoses  |   [facility]  |   [facility]      |
|        | Services     | Surgery   |  Left leg    | Marzena CLIFTON,   | Nita,      |
|        | Required     |           | swelling     | DO  [address] | MD [address]   |
|        |              |           |              |  [place] [place] | [address]  |
|        |              |           |              |  [address]  [place]  | [place]          |
|        |              |           |              | [place]    | [place], [place]  |
|        |              |           |              | [address]        | [address]  Phone: |
|        |              |           |              | Phone:       |  [phone] |
|        |              |           |              | [phone] |   Fax:        |
|        |              |           |              |   Fax:       | [phone]  |
|        |              |           |              | [phone] |               |
+--------+--------------+-----------+--------------+--------------+---------------+
 
 
 
 
 Reason for Visit
 
 
+--------------+----------+
| Reason       | Comments |
+--------------+----------+
| Leg Swelling | left     |
+--------------+----------+
 
 
 
 Encounter Details
 
 
+--------+-----------+---------------------+----------------------+---------------------+
| Date   | Type      | Department          | Care Team            | Description         |
+--------+-----------+---------------------+----------------------+---------------------+
| / | Telephone |   [facility] [place]         |   Marzena Moser  | Leg Swelling (left) |
| 2015   |           | NEPHROLOGY  [address]   | M, DO  [address]      |                     |
|        |           | [address]   | [address]      |                     |
|        |           | [place], [place]     | [place], [place]      |                     |
|        |           | [address]          | [address]  [phone]  |                     |
|        |           | [phone]        |  [phone] (Fax)  |                     |
+--------+-----------+---------------------+----------------------+---------------------+
 
 
 
 
 Social History
 
 
+--------------+-------+-----------+--------+------+
| Tobacco Use  | Types | Packs/Day | Years  | Date |
|              |       |           | Used   |      |
+--------------+-------+-----------+--------+------+
| Never Smoker |       |           |        |      |
+--------------+-------+-----------+--------+------+
 
 
 
+---------------------+---+---+---+
| Smokeless Tobacco:  |   |   |   |
| Never Used          |   |   |   |
+---------------------+---+---+---+
 
 
 
+---------------------------------------------------------------+
| Comments: some second hand smoke exposure, but fairly minimal |
+---------------------------------------------------------------+
 
 
 
+-------------+-------------+---------+----------+
| Alcohol Use | Drinks/Week | oz/Week | Comments |
+-------------+-------------+---------+----------+
| No          |             |         |          |
+-------------+-------------+---------+----------+
 
 
 
+------------------+---------------+
| Sex Assigned at  | Date Recorded |
| Birth            |               |
+------------------+---------------+
| Not on file      |               |
+------------------+---------------+
 
 
 
+----------------+-------------+-------------+
| Job Start Date | Occupation  | Industry    |
+----------------+-------------+-------------+
| Not on file    | Not on file | Not on file |
+----------------+-------------+-------------+
 
 
 
+----------------+--------------+------------+
| Travel History | Travel Start | Travel End |
+----------------+--------------+------------+
 
 
 
+-------------------------------------+
| No recent travel history available. |
 
+-------------------------------------+
 documented as of this encounter
 
 Plan of Treatment
 
 
+--------+-----------+------------+----------------------+-------------------+
| Date   | Type      | Specialty  | Care Team            | Description       |
+--------+-----------+------------+----------------------+-------------------+
| / | Office    | Cardiology |   Tonia Wilson,    |                   |
| 2020   | Visit     |            | ARNJESSICA  [address]   |                   |
|        |           |            | St  [place]  |                   |
|        |           |            | [address]  [phone]  |                   |
|        |           |            |  [phone] (Fax)  |                   |
+--------+-----------+------------+----------------------+-------------------+
| / | Hospital  | Radiology  |   Popeye Townsend, |                   |
|    | Encounter |            |  MD  [address] |                   |
|        |           |            |  St.  [place],   |                   |
|        |           |            | [place] [address]             |                   |
|        |           |            | [phone]         |                   |
|        |           |            | [phone] (Fax)   |                   |
+--------+-----------+------------+----------------------+-------------------+
| / | Surgery   | Radiology  |   Popeye Townsend, | CV EP PPM SYSTEM  |
|    |           |            |  MD  [address] | IMPLANT           |
|        |           |            |  St.  [place],   |                   |
|        |           |            | [place] [address]             |                   |
|        |           |            | [phone]         |                   |
|        |           |            | [phone] (Fax)   |                   |
+--------+-----------+------------+----------------------+-------------------+
| 02/10/ | Clinical  | Cardiology |                      |                   |
|    | Support   |            |                      |                   |
+--------+-----------+------------+----------------------+-------------------+
| / | Office    | Cardiology |   Tonia Wilson,    |                   |
|    | Visit     |            | [facility]  [address]   |                   |
|        |           |            | St  [place]  |                   |
|        |           |            | [address]  [phone]  |                   |
|        |           |            |  [phone] (Fax)  |                   |
+--------+-----------+------------+----------------------+-------------------+
| / | Off-Site  | Nephrology |   Marzena Moser  |                   |
|    | Visit     |            | DO ETIENNE  [address] [place] [address]      |                   |
|        |           |            | [address]      |                   |
|        |           |            | [place], [place]      |                   |
|        |           |            | [address]  [phone]  |                   |
|        |           |            |  [phone] (Fax)  |                   |
+--------+-----------+------------+----------------------+-------------------+
 
 
 
+--------------------+-------------+--------+----------------------+---------------------+
| Name               | Type        | Priori | Associated Diagnoses | Order Schedule      |
|                    |             | ty     |                      |                     |
+--------------------+-------------+--------+----------------------+---------------------+
| Vascular Surgery,  | Outpatient  | Routin |   Left leg swelling  | Ordered: 2015 |
| External - AMB     | Referral    | e      |                      |                     |
| Referral           |             |        |                      |                     |
+--------------------+-------------+--------+----------------------+---------------------+
 documented as of this encounter
 
 Visit Diagnoses
 
 
 
+-------------------------------+
| Diagnosis                     |
+-------------------------------+
|   Left leg swelling - Primary |
+-------------------------------+
 documented in this encounter

## 2020-01-08 NOTE — XMS
Encounter Summary
  Created on: 2020
 
 Viviana Ricci
 External Reference #: [id number]
 : 47
 Sex: Female
 
 Demographics
 
 
+-----------------------+----------------------+
| Address               | [address]      |
|                       | [place]  [address] |
+-----------------------+----------------------+
| Home Phone            | [phone]      |
+-----------------------+----------------------+
| Preferred Language    | Unknown              |
+-----------------------+----------------------+
| Marital Status        | Single               |
+-----------------------+----------------------+
| Evangelical Affiliation | [id number]                 |
+-----------------------+----------------------+
| Race                  | Unknown              |
+-----------------------+----------------------+
| Ethnic Group          | Unknown              |
+-----------------------+----------------------+
 
 
 Author
 
 
+--------------+--------------------------------------------+
| Author       | [facility]  |
|              | and [facility]                                |
+--------------+--------------------------------------------+
| Organization | [facility]  |
|              | and [facility]                                |
+--------------+--------------------------------------------+
| Address      | Unknown                                    |
+--------------+--------------------------------------------+
| Phone        | Unavailable                                |
+--------------+--------------------------------------------+
 
 
 
 Support
 
 
+----------------+--------------+---------------------+-----------------+
| Name           | Relationship | Address             | Phone           |
+----------------+--------------+---------------------+-----------------+
| Ada/Ed Radhames | ECON         | [address]              | [phone] |
|                |              | [place]  |                 |
|                |              |  [id number]              |                 |
+----------------+--------------+---------------------+-----------------+
 
 
 
 
 Care Team Providers
 
 
+-----------------------+------+-------------+
| Care Team Member Name | Role | Phone       |
+-----------------------+------+-------------+
 PCP  | Unavailable |
+-----------------------+------+-------------+
 
 
 
 Encounter Details
 
 
+--------+----------+---------------------+----------------------+-------------+
| Date   | Type     | Department          | Care Team            | Description |
+--------+----------+---------------------+----------------------+-------------+
| / | Abstract |   [facility]         |   Marzena Moser  |             |
|    |          | NEPHROLOGY  [address]   | M, DO  [address]      |             |
|        |          | [address]   | [address]      |             |
|        |          | [place], [place]     | [place]      |             |
|        |          | [address]          | [address]  [phone]  |             |
|        |          | [phone]        |  [phone] (Fax)  |             |
+--------+----------+---------------------+----------------------+-------------+
 
 
 
 Social History
 
 
+--------------+-------+-----------+--------+------+
| Tobacco Use  | Types | Packs/Day | Years  | Date |
|              |       |           | Used   |      |
+--------------+-------+-----------+--------+------+
| Never Smoker |       |           |        |      |
+--------------+-------+-----------+--------+------+
 
 
 
+---------------------+---+---+---+
| Smokeless Tobacco:  |   |   |   |
| Never Used          |   |   |   |
+---------------------+---+---+---+
 
 
 
+---------------------------------------------------------------+
| Comments: some second hand smoke exposure, but fairly minimal |
+---------------------------------------------------------------+
 
 
 
+-------------+-------------+---------+----------+
| Alcohol Use | Drinks/Week | oz/Week | Comments |
+-------------+-------------+---------+----------+
| No          |             |         |          |
+-------------+-------------+---------+----------+
 
 
 
 
+------------------+---------------+
| Sex Assigned at  | Date Recorded |
| Birth            |               |
+------------------+---------------+
| Not on file      |               |
+------------------+---------------+
 
 
 
+----------------+-------------+-------------+
| Job Start Date | Occupation  | Industry    |
+----------------+-------------+-------------+
| Not on file    | Not on file | Not on file |
+----------------+-------------+-------------+
 
 
 
+----------------+--------------+------------+
| Travel History | Travel Start | Travel End |
+----------------+--------------+------------+
 
 
 
+-------------------------------------+
| No recent travel history available. |
+-------------------------------------+
 documented as of this encounter
 
 Plan of Treatment
 
 
+--------+-----------+------------+----------------------+-------------------+
| Date   | Type      | Specialty  | Care Team            | Description       |
+--------+-----------+------------+----------------------+-------------------+
| / | Office    | Cardiology |   Tonia Wilson,    |                   |
|    | Visit     |            | ARNJESSICA  [address]   |                   |
|        |           |            | [place]  |                   |
|        |           |            | [address]  [phone]  |                   |
|        |           |            |  [phone] (Fax)  |                   |
+--------+-----------+------------+----------------------+-------------------+
| / | Hospital  | Radiology  |   Popeye Townsend, |                   |
|    | Encounter |            |  MD  [address] |                   |
|        |           |            |  St.  [place],   |                   |
|        |           |            | [place] [address]             |                   |
|        |           |            | [phone]         |                   |
|        |           |            | [phone] (Fax)   |                   |
+--------+-----------+------------+----------------------+-------------------+
| / | Surgery   | Radiology  |   Popeye Townsend, | CV EP PPM SYSTEM  |
|    |           |            |  MD  [address] | IMPLANT           |
|        |           |            |  St.  [place],   |                   |
|        |           |            | [place] [address]             |                   |
|        |           |            | [phone]         |                   |
|        |           |            | [phone] (Fax)   |                   |
+--------+-----------+------------+----------------------+-------------------+
| 02/10/ | Clinical  | Cardiology |                      |                   |
|    | Support   |            |                      |                   |
+--------+-----------+------------+----------------------+-------------------+
| / | Office    | Cardiology |   Tonia Wilson,    |                   |
|    | Visit     |            | ARNP  [address]   |                   |
 
|        |           |            | St  [place], [place]  |                   |
|        |           |            | [address]  [phone]  |                   |
|        |           |            |  [phone] (Fax)  |                   |
+--------+-----------+------------+----------------------+-------------------+
| / | Off-Site  | Nephrology |   Marzena Moser  |                   |
|    | Visit     |            | M,   [address] [place] [address]      |                   |
|        |           |            | [address]      |                   |
|        |           |            | [place]      |                   |
|        |           |            | [address]  [phone]  |                   |
|        |           |            |  [phone] (Fax)  |                   |
+--------+-----------+------------+----------------------+-------------------+
 documented as of this encounter
 
 Procedures
 
 
+----------------------+--------+------------+----------------------+----------------------+
| Procedure Name       | Priori | Date/Time  | Associated Diagnosis | Comments             |
|                      | ty     |            |                      |                      |
+----------------------+--------+------------+----------------------+----------------------+
| URINALYSIS, CULTURE  | Routin | 2015 |                      |   Results for this   |
| REFLEX               | e      |            |                      | procedure are in the |
|                      |        |            |                      |  results section.    |
+----------------------+--------+------------+----------------------+----------------------+
 documented in this encounter
 
 Results
 Urinalysis, Culture Reflex (2015)
 
+-----------+-----------------------+-----------+------------+--------------+
| Component | Value                 | Ref Range | Performed  | Pathologist  |
|           |                       |           | At         | Signature    |
+-----------+-----------------------+-----------+------------+--------------+
| Culture   | Comment: >100,000 e.  |           |            |              |
| Indicated | coli                  |           |            |              |
+-----------+-----------------------+-----------+------------+--------------+
 
 
 
+-----------------+
| Specimen        |
+-----------------+
| Urine specimen  |
| (specimen)      |
+-----------------+
 
 
 
+------------------+----------------+--------+----------------+
| Organism         | Antibiotic     | Method | Susceptibility |
+------------------+----------------+--------+----------------+
| Escherichia coli | Cephalexin     |        |   Sensitive    |
+------------------+----------------+--------+----------------+
| Escherichia coli | Ciprofloxacin  |        |   Sensitive    |
+------------------+----------------+--------+----------------+
| Escherichia coli | Amoxicillin +  |        |   Sensitive    |
|                  | Clavulanate    |        |                |
+------------------+----------------+--------+----------------+
| Escherichia coli | Nitrofurantoin |        |   Sensitive    |
+------------------+----------------+--------+----------------+
 
| Escherichia coli | Levofloxacin   |        |   Sensitive    |
+------------------+----------------+--------+----------------+
| Escherichia coli | Trimethoprim   |        |   Sensitive    |
+------------------+----------------+--------+----------------+
| Escherichia coli | Tetracycline   |        |   Sensitive    |
+------------------+----------------+--------+----------------+
 documented in this encounter
 
 Visit Diagnoses
 Not on filedocumented in this encounter

## 2020-01-08 NOTE — XMS
Encounter Summary
  Created on: 2020
 
 Viviana Ricci
 External Reference #: [id number]
 : 47
 Sex: Female
 
 Demographics
 
 
+-----------------------+----------------------+
| Address               | [address]      |
|                       | [place]  [address] |
+-----------------------+----------------------+
| Home Phone            | [phone]      |
+-----------------------+----------------------+
| Preferred Language    | Unknown              |
+-----------------------+----------------------+
| Marital Status        | Single               |
+-----------------------+----------------------+
| Denominational Affiliation | [id number]                 |
+-----------------------+----------------------+
| Race                  | Unknown              |
+-----------------------+----------------------+
| Ethnic Group          | Unknown              |
+-----------------------+----------------------+
 
 
 Author
 
 
+--------------+--------------------------------------------+
| Author       | [facility]  |
|              | and [facility]                                |
+--------------+--------------------------------------------+
| Organization | [facility]  |
|              | and [facility]                                |
+--------------+--------------------------------------------+
| Address      | Unknown                                    |
+--------------+--------------------------------------------+
| Phone        | Unavailable                                |
+--------------+--------------------------------------------+
 
 
 
 Support
 
 
+----------------+--------------+---------------------+-----------------+
| Name           | Relationship | Address             | Phone           |
+----------------+--------------+---------------------+-----------------+
| Ada/Ed Radhames | ECON         | [address]              | [phone] |
|                |              | [place]  |                 |
|                |              |  [id number]              |                 |
+----------------+--------------+---------------------+-----------------+
 
 
 
 
 Care Team Providers
 
 
+-----------------------+------+-------------+
| Care Team Member Name | Role | Phone       |
+-----------------------+------+-------------+
 PCP  | Unavailable |
+-----------------------+------+-------------+
 
 
 
 Encounter Details
 
 
+--------+----------+---------------------+----------------------+-------------+
| Date   | Type     | Department          | Care Team            | Description |
+--------+----------+---------------------+----------------------+-------------+
| / | Abstract |   [facility] [place]         |   Marzena Moser  |             |
|    |          | NEPHROLOGY  [address]   | M, DO  [address]      |             |
|        |          | [address]   | [address]      |             |
|        |          | [place], [place]     | [place]      |             |
|        |          | [address]          | [address]  [phone]  |             |
|        |          | [phone]        |  [phone] (Fax)  |             |
+--------+----------+---------------------+----------------------+-------------+
 
 
 
 Social History
 
 
+--------------+-------+-----------+--------+------+
| Tobacco Use  | Types | Packs/Day | Years  | Date |
|              |       |           | Used   |      |
+--------------+-------+-----------+--------+------+
| Never Smoker |       |           |        |      |
+--------------+-------+-----------+--------+------+
 
 
 
+---------------------+---+---+---+
| Smokeless Tobacco:  |   |   |   |
| Never Used          |   |   |   |
+---------------------+---+---+---+
 
 
 
+---------------------------------------------------------------+
| Comments: some second hand smoke exposure, but fairly minimal |
+---------------------------------------------------------------+
 
 
 
+-------------+-------------+---------+----------+
| Alcohol Use | Drinks/Week | oz/Week | Comments |
+-------------+-------------+---------+----------+
| No          |             |         |          |
+-------------+-------------+---------+----------+
 
 
 
 
+------------------+---------------+
| Sex Assigned at  | Date Recorded |
| Birth            |               |
+------------------+---------------+
| Not on file      |               |
+------------------+---------------+
 
 
 
+----------------+-------------+-------------+
| Job Start Date | Occupation  | Industry    |
+----------------+-------------+-------------+
| Not on file    | Not on file | Not on file |
+----------------+-------------+-------------+
 
 
 
+----------------+--------------+------------+
| Travel History | Travel Start | Travel End |
+----------------+--------------+------------+
 
 
 
+-------------------------------------+
| No recent travel history available. |
+-------------------------------------+
 documented as of this encounter
 
 Plan of Treatment
 
 
+--------+-----------+------------+----------------------+-------------------+
| Date   | Type      | Specialty  | Care Team            | Description       |
+--------+-----------+------------+----------------------+-------------------+
| / | Office    | Cardiology |   Tonia Wilson,    |                   |
|    | Visit     |            | ARNJESSICA  [address]   |                   |
|        |           |            | [place]  |                   |
|        |           |            | [address]  [phone]  |                   |
|        |           |            |  [phone] (Fax)  |                   |
+--------+-----------+------------+----------------------+-------------------+
| / | Hospital  | Radiology  |   Popeye Townsend, |                   |
|    | Encounter |            |  MD  [address] |                   |
|        |           |            |  St.  [place],   |                   |
|        |           |            | [place] [address]             |                   |
|        |           |            | [phone]         |                   |
|        |           |            | [phone] (Fax)   |                   |
+--------+-----------+------------+----------------------+-------------------+
| / | Surgery   | Radiology  |   Popeye Townsend, | CV EP PPM SYSTEM  |
|    |           |            |  MD  [address] | IMPLANT           |
|        |           |            |  St.  [place],   |                   |
|        |           |            | [place] [address]             |                   |
|        |           |            | [phone]         |                   |
|        |           |            | [phone] (Fax)   |                   |
+--------+-----------+------------+----------------------+-------------------+
| 02/10/ | Clinical  | Cardiology |                      |                   |
|    | Support   |            |                      |                   |
+--------+-----------+------------+----------------------+-------------------+
| / | Office    | Cardiology |   Tonia Wilson,    |                   |
|    | Visit     |            | ARNP  [address]   |                   |
 
|        |           |            | St  [place], [place]  |                   |
|        |           |            | [address]  [phone]  |                   |
|        |           |            |  [phone] (Fax)  |                   |
+--------+-----------+------------+----------------------+-------------------+
| / | Off-Site  | Nephrology |   Marzena Moser  |                   |
| 2020   | Visit     |            | DO ETIENNE  [address] [place] [address]      |                   |
|        |           |            | [address]      |                   |
|        |           |            | [place]      |                   |
|        |           |            | [address]  [phone]  |                   |
|        |           |            |  [phone] (Fax)  |                   |
+--------+-----------+------------+----------------------+-------------------+
 documented as of this encounter
 
 Procedures
 
 
+----------------------+--------+------------+----------------------+----------------------+
| Procedure Name       | Priori | Date/Time  | Associated Diagnosis | Comments             |
|                      | ty     |            |                      |                      |
+----------------------+--------+------------+----------------------+----------------------+
| EXTERNAL LAB:        | Routin | 2016 |                      |   Results for this   |
| URINALYSIS           | e      |            |                      | procedure are in the |
|                      |        |            |                      |  results section.    |
+----------------------+--------+------------+----------------------+----------------------+
| URINALYSIS W/CULTURE | Routin | 2016 |                      |   Results for this   |
|  IF INDICATED        | e      |            |                      | procedure are in the |
|                      |        |            |                      |  results section.    |
+----------------------+--------+------------+----------------------+----------------------+
| URINALYSIS W/CULTURE | Routin | 2016 |                      |   Results for this   |
|  IF INDICATED        | e      |            |                      | procedure are in the |
|                      |        |            |                      |  results section.    |
+----------------------+--------+------------+----------------------+----------------------+
| URINALYSIS W/CULTURE | Routin | 2016 |                      |   Results for this   |
|  IF INDICATED        | e      |            |                      | procedure are in the |
|                      |        |            |                      |  results section.    |
+----------------------+--------+------------+----------------------+----------------------+
| URINALYSIS W/CULTURE | Routin | 2016 |                      |   Results for this   |
|  IF INDICATED        | e      |            |                      | procedure are in the |
|                      |        |            |                      |  results section.    |
+----------------------+--------+------------+----------------------+----------------------+
| URINALYSIS W/CULTURE | Routin | 2016 |                      |   Results for this   |
|  IF INDICATED        | e      |            |                      | procedure are in the |
|                      |        |            |                      |  results section.    |
+----------------------+--------+------------+----------------------+----------------------+
| URINALYSIS W/CULTURE | Routin | 2016 |                      |   Results for this   |
|  IF INDICATED        | e      |            |                      | procedure are in the |
|                      |        |            |                      |  results section.    |
+----------------------+--------+------------+----------------------+----------------------+
| URINALYSIS W/CULTURE | Routin | 2016 |                      |   Results for this   |
|  IF INDICATED        | e      |            |                      | procedure are in the |
|                      |        |            |                      |  results section.    |
+----------------------+--------+------------+----------------------+----------------------+
| URINALYSIS W/CULTURE | Routin | 2016 |                      |   Results for this   |
|  IF INDICATED        | e      |            |                      | procedure are in the |
|                      |        |            |                      |  results section.    |
+----------------------+--------+------------+----------------------+----------------------+
| URINALYSIS W/CULTURE | Routin | 2016 |                      |   Results for this   |
|  IF INDICATED        | e      |            |                      | procedure are in the |
|                      |        |            |                      |  results section.    |
+----------------------+--------+------------+----------------------+----------------------+
 
| URINALYSIS W/CULTURE | Routin | 2016 |                      |   Results for this   |
|  IF INDICATED        | e      |            |                      | procedure are in the |
|                      |        |            |                      |  results section.    |
+----------------------+--------+------------+----------------------+----------------------+
| URINALYSIS W/CULTURE | Routin | 2016 |                      |   Results for this   |
|  IF INDICATED        | e      |            |                      | procedure are in the |
|                      |        |            |                      |  results section.    |
+----------------------+--------+------------+----------------------+----------------------+
 documented in this encounter
 
 Results
 Urinalysis w/Culture if Indicated (2016)
 
+-------------+--------+---------------+------------+--------------+
| Component   | Value  | Ref Range     | Performed  | Pathologist  |
|             |        |               | At         | Signature    |
+-------------+--------+---------------+------------+--------------+
| BACTERIA UA | 3+ (A) | Negative /HPF |            |              |
+-------------+--------+---------------+------------+--------------+
 
 
 
+-----------------+
| Specimen        |
+-----------------+
| Urine specimen  |
| (specimen)      |
+-----------------+
 Urinalysis w/Culture if Indicated (2016)
 
+------------+----------+-----------+------------+--------------+
| Component  | Value    | Ref Range | Performed  | Pathologist  |
|            |          |           | At         | Signature    |
+------------+----------+-----------+------------+--------------+
| CRYSTAL UA | Negative |           |            |              |
+------------+----------+-----------+------------+--------------+
 
 
 
+-----------------+
| Specimen        |
+-----------------+
| Urine specimen  |
| (specimen)      |
+-----------------+
 Urinalysis w/Culture if Indicated (2016)
 
+-------------+----------+-----------+------------+--------------+
| Component   | Value    | Ref Range | Performed  | Pathologist  |
|             |          |           | At         | Signature    |
+-------------+----------+-----------+------------+--------------+
| Epithelial  | Negative |           |            |              |
| Cells       |          |           |            |              |
+-------------+----------+-----------+------------+--------------+
 
 
 
+-----------------+
| Specimen        |
+-----------------+
 
| Urine specimen  |
| (specimen)      |
+-----------------+
 Urinalysis w/Culture if Indicated (2016)
 
+-----------+----------+-----------+------------+--------------+
| Component | Value    | Ref Range | Performed  | Pathologist  |
|           |          |           | At         | Signature    |
+-----------+----------+-----------+------------+--------------+
| CASTS     | Negative |           |            |              |
+-----------+----------+-----------+------------+--------------+
 
 
 
+-----------------+
| Specimen        |
+-----------------+
| Urine specimen  |
| (specimen)      |
+-----------------+
 Urinalysis w/Culture if Indicated (2016)
 
+-----------+--------+------------+------------+--------------+
| Component | Value  | Ref Range  | Performed  | Pathologist  |
|           |        |            | At         | Signature    |
+-----------+--------+------------+------------+--------------+
| WBC       | 30 (A) | 0 - 4 /HPF |            |              |
+-----------+--------+------------+------------+--------------+
 
 
 
+-----------------+
| Specimen        |
+-----------------+
| Urine specimen  |
| (specimen)      |
+-----------------+
 Urinalysis w/Culture if Indicated (2016)
 
+-----------+----------+-----------+------------+--------------+
| Component | Value    | Ref Range | Performed  | Pathologist  |
|           |          |           | At         | Signature    |
+-----------+----------+-----------+------------+--------------+
| CASTS     | Negative |           |            |              |
+-----------+----------+-----------+------------+--------------+
 
 
 
+-----------------+
| Specimen        |
+-----------------+
| Urine specimen  |
| (specimen)      |
+-----------------+
 Urinalysis w/Culture if Indicated (2016)
 
+-------------+--------+-----------------+------------+--------------+
| Component   | Value  | Ref Range       | Performed  | Pathologist  |
|             |        |                 | At         | Signature    |
+-------------+--------+-----------------+------------+--------------+
 
| Urobilinoge | Normal | < 0.2 mg/dL,    |            |              |
| n, Urine    |        | 1.0 mg/dL, 4.0  |            |              |
|             |        | mg/dL, Normal,  |            |              |
|             |        | 1.0 E.U./dL,    |            |              |
|             |        | 0.2 E.U./dL,    |            |              |
|             |        | 0.2 mg/dL,      |            |              |
|             |        | Negative, 1     |            |              |
|             |        | mg/dL, <2.0     |            |              |
|             |        | mg/dL           |            |              |
+-------------+--------+-----------------+------------+--------------+
 
 
 
+-----------------+
| Specimen        |
+-----------------+
| Urine specimen  |
| (specimen)      |
+-----------------+
 Urinalysis w/Culture if Indicated (2016)
 
+-----------+--------------+-----------+------------+--------------+
| Component | Value        | Ref Range | Performed  | Pathologist  |
|           |              |           | At         | Signature    |
+-----------+--------------+-----------+------------+--------------+
| Nitrite,  | Positive (A) | Negative  |            |              |
| Urine     |              |           |            |              |
+-----------+--------------+-----------+------------+--------------+
 
 
 
+-----------------+
| Specimen        |
+-----------------+
| Urine specimen  |
| (specimen)      |
+-----------------+
 Urinalysis w/Culture if Indicated (2016)
 
+-------------+----------+-----------+------------+--------------+
| Component   | Value    | Ref Range | Performed  | Pathologist  |
|             |          |           | At         | Signature    |
+-------------+----------+-----------+------------+--------------+
| Bilirubin,  | Negative | Negative  |            |              |
| Urine       |          |           |            |              |
+-------------+----------+-----------+------------+--------------+
 
 
 
+-----------------+
| Specimen        |
+-----------------+
| Urine specimen  |
| (specimen)      |
+-----------------+
 Urinalysis w/Culture if Indicated (2016)
 
+-----------+-------+-----------+------------+--------------+
| Component | Value | Ref Range | Performed  | Pathologist  |
|           |       |           | At         | Signature    |
 
+-----------+-------+-----------+------------+--------------+
| Clarity   | Clear |           |            |              |
+-----------+-------+-----------+------------+--------------+
 
 
 
+-----------------+
| Specimen        |
+-----------------+
| Urine specimen  |
| (specimen)      |
+-----------------+
 Urinalysis w/Culture if Indicated (2016)
 
+-----------+--------+----------------+------------+--------------+
| Component | Value  | Ref Range      | Performed  | Pathologist  |
|           |        |                | At         | Signature    |
+-----------+--------+----------------+------------+--------------+
| Color,    | Yellow | Light Yellow,  |            |              |
| Urine     |        | Yellow         |            |              |
+-----------+--------+----------------+------------+--------------+
 
 
 
+-----------------+
| Specimen        |
+-----------------+
| Urine specimen  |
| (specimen)      |
+-----------------+
 External Lab: Urinalysis (2016)
 
+-------------+----------+--------------+------------+--------------+
| Component   | Value    | Ref Range    | Performed  | Pathologist  |
|             |          |              | At         | Signature    |
+-------------+----------+--------------+------------+--------------+
| UA Blood,   | Negative |              | EXTERNAL   |              |
| External    |          |              | LAB        |              |
+-------------+----------+--------------+------------+--------------+
| UA Glucose, | Normal   |              | EXTERNAL   |              |
|  External   |          |              | LAB        |              |
+-------------+----------+--------------+------------+--------------+
| UA Ketones, | Negative |              | EXTERNAL   |              |
|  External   |          |              | LAB        |              |
+-------------+----------+--------------+------------+--------------+
| UA Ph,      | 5        | 5 - 9        | EXTERNAL   |              |
| External    |          |              | LAB        |              |
+-------------+----------+--------------+------------+--------------+
| UA          | Negative |              | EXTERNAL   |              |
| Proteins,   |          |              | LAB        |              |
| External    |          |              |            |              |
+-------------+----------+--------------+------------+--------------+
| UA RBC,     | 0        | 0 - 4        | EXTERNAL   |              |
| External    |          |              | LAB        |              |
+-------------+----------+--------------+------------+--------------+
| UA Specific | 1.012    | 1.005 - 1.03 | EXTERNAL   |              |
|  Gravity,   |          |              | LAB        |              |
| External    |          |              |            |              |
+-------------+----------+--------------+------------+--------------+
| UA          | 25 (A)   | 0 - 0        | EXTERNAL   |              |
 
| Leukocyte   |          |              | LAB        |              |
| Esterase,   |          |              |            |              |
| External    |          |              |            |              |
+-------------+----------+--------------+------------+--------------+
 
 
 
+------------------------------------+
| Resulting Agency Comment           |
+------------------------------------+
|   [facility] |
+------------------------------------+
 
 
 
+----------------+---------+--------------------+--------------+
| Performing     | Address | City/State/Zipcode | Phone Number |
| Organization   |         |                    |              |
+----------------+---------+--------------------+--------------+
|   EXTERNAL LAB |         |                    |              |
+----------------+---------+--------------------+--------------+
 documented in this encounter
 
 Visit Diagnoses
 Not on filedocumented in this encounter

## 2020-01-08 NOTE — XMS
Encounter Summary
  Created on: 2020
 
 Viviana Ricci
 External Reference #: [id number]
 : 47
 Sex: Female
 
 Demographics
 
 
+-----------------------+----------------------+
| Address               | [address]      |
|                       | [place]  [address] |
+-----------------------+----------------------+
| Home Phone            | [phone]      |
+-----------------------+----------------------+
| Preferred Language    | Unknown              |
+-----------------------+----------------------+
| Marital Status        | Single               |
+-----------------------+----------------------+
| Confucianism Affiliation | [id number]                 |
+-----------------------+----------------------+
| Race                  | Unknown              |
+-----------------------+----------------------+
| Ethnic Group          | Unknown              |
+-----------------------+----------------------+
 
 
 Author
 
 
+--------------+--------------------------------------------+
| Author       | [facility]  |
|              | and [facility]                                |
+--------------+--------------------------------------------+
| Organization | [facility]  |
|              | and [facility]                                |
+--------------+--------------------------------------------+
| Address      | Unknown                                    |
+--------------+--------------------------------------------+
| Phone        | Unavailable                                |
+--------------+--------------------------------------------+
 
 
 
 Support
 
 
+----------------+--------------+---------------------+-----------------+
| Name           | Relationship | Address             | Phone           |
+----------------+--------------+---------------------+-----------------+
| Ada/Ed Radhames | ECON         | [place]              | [phone] |
|                |              | [place]  |                 |
|                |              |  [address]              |                 |
+----------------+--------------+---------------------+-----------------+
 
 
 
 
 Care Team Providers
 
 
+-----------------------+------+-------------+
| Care Team Member Name | Role | Phone       |
+-----------------------+------+-------------+
 PCP  | Unavailable |
+-----------------------+------+-------------+
 
 
 
 Reason for Visit
 
 
+-------------------+----------+
| Reason            | Comments |
+-------------------+----------+
| Medication Refill |          |
+-------------------+----------+
 
 
 
 Encounter Details
 
 
+--------+--------+---------------------+----------------------+-------------------+
| Date   | Type   | Department          | Care Team            | Description       |
+--------+--------+---------------------+----------------------+-------------------+
| / | Refill |   [facility]         |   Marzena Moser  | Medication Refill |
|    |        | NEPHROLOGY  [address]   | M, DO  [address]      |                   |
|        |        | [address]   | [address]      |                   |
|        |        | [place], [place]     | [place], [place]      |                   |
|        |        | [address]          | [address]  [phone]  |                   |
|        |        | [phone]        |  [phone] (Fax)  |                   |
+--------+--------+---------------------+----------------------+-------------------+
 
 
 
 Social History
 
 
+--------------+-------+-----------+--------+------+
| Tobacco Use  | Types | Packs/Day | Years  | Date |
|              |       |           | Used   |      |
+--------------+-------+-----------+--------+------+
| Never Smoker |       |           |        |      |
+--------------+-------+-----------+--------+------+
 
 
 
+---------------------+---+---+---+
| Smokeless Tobacco:  |   |   |   |
| Never Used          |   |   |   |
+---------------------+---+---+---+
 
 
 
+---------------------------------------------------------------+
| Comments: some second hand smoke exposure, but fairly minimal |
 
+---------------------------------------------------------------+
 
 
 
+-------------+-------------+---------+----------+
| Alcohol Use | Drinks/Week | oz/Week | Comments |
+-------------+-------------+---------+----------+
| No          |             |         |          |
+-------------+-------------+---------+----------+
 
 
 
+------------------+---------------+
| Sex Assigned at  | Date Recorded |
| Birth            |               |
+------------------+---------------+
| Not on file      |               |
+------------------+---------------+
 
 
 
+----------------+-------------+-------------+
| Job Start Date | Occupation  | Industry    |
+----------------+-------------+-------------+
| Not on file    | Not on file | Not on file |
+----------------+-------------+-------------+
 
 
 
+----------------+--------------+------------+
| Travel History | Travel Start | Travel End |
+----------------+--------------+------------+
 
 
 
+-------------------------------------+
| No recent travel history available. |
+-------------------------------------+
 documented as of this encounter
 
 Plan of Treatment
 
 
+--------+-----------+------------+----------------------+-------------------+
| Date   | Type      | Specialty  | Care Team            | Description       |
+--------+-----------+------------+----------------------+-------------------+
| / | Office    | Cardiology |   HellalfredoTonia,    |                   |
|    | Visit     |            | ARNP  [address]   |                   |
|        |           |            | St  [place], [place]  |                   |
|        |           |            | [address]  [phone]  |                   |
|        |           |            |  [phone] (Fax)  |                   |
+--------+-----------+------------+----------------------+-------------------+
| / | Hospital  | Radiology  |   Popeye Townsend, |                   |
|    | Encounter |            |  MD  [address] |                   |
|        |           |            |  St.  [place],   |                   |
|        |           |            | [place] [address]             |                   |
|        |           |            | [phone]         |                   |
|        |           |            | [phone] (Fax)   |                   |
+--------+-----------+------------+----------------------+-------------------+
| / | Surgery   | Radiology  |   Popeye Townsend, | CV EP PPM SYSTEM  |
 
|    |           |            |  MD  [address] | IMPLANT           |
|        |           |            |  St.  [place],   |                   |
|        |           |            | [place] [address]             |                   |
|        |           |            | [phone]         |                   |
|        |           |            | [phone] (Fax)   |                   |
+--------+-----------+------------+----------------------+-------------------+
| 02/10/ | Clinical  | Cardiology |                      |                   |
|    | Support   |            |                      |                   |
+--------+-----------+------------+----------------------+-------------------+
| / | Office    | Cardiology |   Tonia Wilson,    |                   |
|    | Visit     |            | ARNP  [address]   |                   |
|        |           |            | [place]  |                   |
|        |           |            | [address]  [phone]  |                   |
|        |           |            |  [phone] (Fax)  |                   |
+--------+-----------+------------+----------------------+-------------------+
| / | Off-Site  | Nephrology |   Marzena Moser  |                   |
|    | Visit     |            | DO ETIENNE  [address] [place] [address]      |                   |
|        |           |            | [address]      |                   |
|        |           |            | [place]      |                   |
|        |           |            | [address]  [phone]  |                   |
|        |           |            |  [phone] (Fax)  |                   |
+--------+-----------+------------+----------------------+-------------------+
 documented as of this encounter
 
 Visit Diagnoses
 Not on filedocumented in this encounter

## 2020-01-08 NOTE — XMS
Encounter Summary
  Created on: 2020
 
 Viviana Ricci
 External Reference #: [id number]
 : 47
 Sex: Female
 
 Demographics
 
 
+-----------------------+----------------------+
| Address               | [address]      |
|                       | [place]  [address] |
+-----------------------+----------------------+
| Home Phone            | [phone]      |
+-----------------------+----------------------+
| Preferred Language    | Unknown              |
+-----------------------+----------------------+
| Marital Status        | Single               |
+-----------------------+----------------------+
| Mormonism Affiliation | [id number]                 |
+-----------------------+----------------------+
| Race                  | Unknown              |
+-----------------------+----------------------+
| Ethnic Group          | Unknown              |
+-----------------------+----------------------+
 
 
 Author
 
 
+--------------+--------------------------------------------+
| Author       | [facility]  |
|              | and [place]                                |
+--------------+--------------------------------------------+
| Organization | [facility]  |
|              | and [facility]                                |
+--------------+--------------------------------------------+
| Address      | Unknown                                    |
+--------------+--------------------------------------------+
| Phone        | Unavailable                                |
+--------------+--------------------------------------------+
 
 
 
 Support
 
 
+----------------+--------------+---------------------+-----------------+
| Name           | Relationship | Address             | Phone           |
+----------------+--------------+---------------------+-----------------+
| Ada/Ed Radhames | ECON         | [place]              | [phone] |
|                |              | [place]  |                 |
|                |              |  [address]              |                 |
+----------------+--------------+---------------------+-----------------+
 
 
 
 
 Care Team Providers
 
 
+-----------------------+------+-------------+
| Care Team Member Name | Role | Phone       |
+-----------------------+------+-------------+
 PCP  | Unavailable |
+-----------------------+------+-------------+
 
 
 
 Reason for Visit
 
 
+-------------------+----------+
| Reason            | Comments |
+-------------------+----------+
| Medication Refill |          |
+-------------------+----------+
 
 
 
 Encounter Details
 
 
+--------+--------+---------------------+----------------------+-------------------+
| Date   | Type   | Department          | Care Team            | Description       |
+--------+--------+---------------------+----------------------+-------------------+
| / | Refill |   [facility]         |   Marzena Moser  | Medication Refill |
| 2016   |        | NEPHROLOGY  [address]   | M, DO  [address]      |                   |
|        |        | [address]   | [address]      |                   |
|        |        | [place], [place]     | [place], [place]      |                   |
|        |        | [address]          | [address]  [phone]  |                   |
|        |        | [phone]        |  [phone] (Fax)  |                   |
+--------+--------+---------------------+----------------------+-------------------+
 
 
 
 Social History
 
 
+--------------+-------+-----------+--------+------+
| Tobacco Use  | Types | Packs/Day | Years  | Date |
|              |       |           | Used   |      |
+--------------+-------+-----------+--------+------+
| Never Smoker |       |           |        |      |
+--------------+-------+-----------+--------+------+
 
 
 
+---------------------+---+---+---+
| Smokeless Tobacco:  |   |   |   |
| Never Used          |   |   |   |
+---------------------+---+---+---+
 
 
 
+---------------------------------------------------------------+
| Comments: some second hand smoke exposure, but fairly minimal |
 
+---------------------------------------------------------------+
 
 
 
+-------------+-------------+---------+----------+
| Alcohol Use | Drinks/Week | oz/Week | Comments |
+-------------+-------------+---------+----------+
| No          |             |         |          |
+-------------+-------------+---------+----------+
 
 
 
+------------------+---------------+
| Sex Assigned at  | Date Recorded |
| Birth            |               |
+------------------+---------------+
| Not on file      |               |
+------------------+---------------+
 
 
 
+----------------+-------------+-------------+
| Job Start Date | Occupation  | Industry    |
+----------------+-------------+-------------+
| Not on file    | Not on file | Not on file |
+----------------+-------------+-------------+
 
 
 
+----------------+--------------+------------+
| Travel History | Travel Start | Travel End |
+----------------+--------------+------------+
 
 
 
+-------------------------------------+
| No recent travel history available. |
+-------------------------------------+
 documented as of this encounter
 
 Plan of Treatment
 
 
+--------+-----------+------------+----------------------+-------------------+
| Date   | Type      | Specialty  | Care Team            | Description       |
+--------+-----------+------------+----------------------+-------------------+
| / | Office    | Cardiology |   HellalfredoTonia,    |                   |
|    | Visit     |            | ARNP  [address]   |                   |
|        |           |            | St  [place], [place]  |                   |
|        |           |            | [address]  [phone]  |                   |
|        |           |            |  [phone] (Fax)  |                   |
+--------+-----------+------------+----------------------+-------------------+
| / | Hospital  | Radiology  |   Popeye Townsend, |                   |
|    | Encounter |            |  MD  [address] |                   |
|        |           |            |  St.  [place],   |                   |
|        |           |            | [place] [address]             |                   |
|        |           |            | [phone]         |                   |
|        |           |            | [phone] (Fax)   |                   |
+--------+-----------+------------+----------------------+-------------------+
| / | Surgery   | Radiology  |   Popeye Townsend, | CV EP PPM SYSTEM  |
 
|    |           |            |  MD  [address] | IMPLANT           |
|        |           |            |  St.  [place],   |                   |
|        |           |            | [place] [address]             |                   |
|        |           |            | [phone]         |                   |
|        |           |            | [phone] (Fax)   |                   |
+--------+-----------+------------+----------------------+-------------------+
| 02/10/ | Clinical  | Cardiology |                      |                   |
|    | Support   |            |                      |                   |
+--------+-----------+------------+----------------------+-------------------+
| / | Office    | Cardiology |   Tonia Wilson,    |                   |
|    | Visit     |            | ARNP  [address]   |                   |
|        |           |            | [place]  |                   |
|        |           |            | [address]  [phone]  |                   |
|        |           |            |  [phone] (Fax)  |                   |
+--------+-----------+------------+----------------------+-------------------+
| / | Off-Site  | Nephrology |   Marzena Moser  |                   |
|    | Visit     |            | DO ETIENNE  [address] [place] [address]      |                   |
|        |           |            | [address]      |                   |
|        |           |            | [place]      |                   |
|        |           |            | [address]  [phone]  |                   |
|        |           |            |  [phone] (Fax)  |                   |
+--------+-----------+------------+----------------------+-------------------+
 documented as of this encounter
 
 Visit Diagnoses
 
 
+----------------------------------------------------------------------------------------+
| Diagnosis                                                                              |
+----------------------------------------------------------------------------------------+
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
| left (HCC) - Primary                                                                   |
+----------------------------------------------------------------------------------------+
 documented in this encounter

## 2020-01-08 NOTE — XMS
Clinical Summary
  Created on: 2020
 
 Viviana Ricci
 External Reference #: [id number]
 : 47
 Sex: Female
 
 Demographics
 
 
+-----------------------+----------------------+
| Address               | [address]      |
|                       | [place]  [address] |
+-----------------------+----------------------+
| Home Phone            | [phone]      |
+-----------------------+----------------------+
| Preferred Language    | Unknown              |
+-----------------------+----------------------+
| Marital Status        | Single               |
+-----------------------+----------------------+
| Sikh Affiliation | [id number]                 |
+-----------------------+----------------------+
| Race                  | Unknown              |
+-----------------------+----------------------+
| Ethnic Group          | Unknown              |
+-----------------------+----------------------+
 
 
 Author
 
 
+--------------+--------------------------------------------+
| Author       | [facility]  |
|              | and [facility]                                |
+--------------+--------------------------------------------+
| Organization | [facility]  |
|              | and [facility]                                |
+--------------+--------------------------------------------+
| Address      | Unknown                                    |
+--------------+--------------------------------------------+
| Phone        | Unavailable                                |
+--------------+--------------------------------------------+
 
 
 
 Support
 
 
+----------------+--------------+---------------------+-----------------+
| Name           | Relationship | Address             | Phone           |
+----------------+--------------+---------------------+-----------------+
| Ada/Ed Radhames | ECON         | [place]              | [phone] |
|                |              | [place]  |                 |
|                |              |  [id number]              |                 |
+----------------+--------------+---------------------+-----------------+
 
 
 
 
 Care Team Providers
 
 
+------------------------+------+-----------------+
| Care Team Member Name  | Role | Phone           |
+------------------------+------+-----------------+
| Marzena Moser DO | PCP  | [phone] |
+------------------------+------+-----------------+
 
 
 
 Allergies
 
 
+-----------------+-----------+----------+----------+----------+
| Active Allergy  | Reactions | Severity | Noted    | Comments |
|                 |           |          | Date     |          |
+-----------------+-----------+----------+----------+----------+
| Adhesive & Tape | Rash      | Low      | 20 |          |
|                 |           |          | 19       |          |
+-----------------+-----------+----------+----------+----------+
 
 
 
 Medications
 
 
+----------------------+----------------------+-----------+---------+------+------+-------+
| Medication           | Sig                  | Dispensed | Refills | Star | End  | Statu |
|                      |                      |           |         | t    | Date | s     |
|                      |                      |           |         | Date |      |       |
+----------------------+----------------------+-----------+---------+------+------+-------+
|   glucose blood VI   | Check blood sugar    |   100     | 12      | 11/2 |      | Activ |
| test strips (ONE     | before each meal and | each      |         | 6/20 |      | e     |
| TOUCH ULTRA TEST)    |  as directed         |           |         | 12   |      |       |
| stripIndications:    |                      |           |         |      |      |       |
| Unspecified          |                      |           |         |      |      |       |
| hypertensive kidney  |                      |           |         |      |      |       |
| disease with chronic |                      |           |         |      |      |       |
|  kidney disease      |                      |           |         |      |      |       |
| stage I through      |                      |           |         |      |      |       |
| stage IV, or         |                      |           |         |      |      |       |
| unspecified(403.90), |                      |           |         |      |      |       |
|  Complications of    |                      |           |         |      |      |       |
| transplanted kidney, |                      |           |         |      |      |       |
|  Diabetes mellitus   |                      |           |         |      |      |       |
| type II,             |                      |           |         |      |      |       |
| uncontrolled (HCC),  |                      |           |         |      |      |       |
| Hyperlipidemia       |                      |           |         |      |      |       |
+----------------------+----------------------+-----------+---------+------+------+-------+
|   Respiratory        | Decrease CPAP to     |   1 each  | 0       | 06/1 |      | Activ |
| Therapy Supplies     | 12-18 cmH2O          |           |         | 9/20 |      | e     |
| MISC                 | Diagnosis            |           |         | 13   |      |       |
|                      | Code(s)327.23.       |           |         |      |      |       |
|                      | Please send order to |           |         |      |      |       |
|                      |  [facility].     |           |         |      |      |       |
+----------------------+----------------------+-----------+---------+------+------+-------+
|   cholecalciferol    | Take 2,000 Units by  |           | 0       |      |      | Activ |
| (VITAMIN D-3) 2000   | mouth Every other    |           |         |      |      | e     |
 
| UNITS                | day.                 |           |         |      |      |       |
| TABSIndications:     |                      |           |         |      |      |       |
| Unspecified          |                      |           |         |      |      |       |
| hypertensive kidney  |                      |           |         |      |      |       |
| disease with chronic |                      |           |         |      |      |       |
|  kidney disease      |                      |           |         |      |      |       |
| stage I through      |                      |           |         |      |      |       |
| stage IV, or         |                      |           |         |      |      |       |
| unspecified(403.90), |                      |           |         |      |      |       |
|  FSGS (focal         |                      |           |         |      |      |       |
| segmental            |                      |           |         |      |      |       |
| glomerulosclerosis), |                      |           |         |      |      |       |
|  Hyperlipidemia,     |                      |           |         |      |      |       |
| Complications of     |                      |           |         |      |      |       |
| transplanted kidney  |                      |           |         |      |      |       |
+----------------------+----------------------+-----------+---------+------+------+-------+
|   loperamide         | Take 1 capsule by    |   60      | 5       | 08/2 |      | Activ |
| (ANTI-DIARRHEAL) 2   | mouth 4 times daily  | capsule   |         | 5/20 |      | e     |
| mg                   | as needed.           |           |         | 14   |      |       |
| capsuleIndications:  |                      |           |         |      |      |       |
| Unspecified          |                      |           |         |      |      |       |
| hypertensive kidney  |                      |           |         |      |      |       |
| disease with chronic |                      |           |         |      |      |       |
|  kidney disease      |                      |           |         |      |      |       |
| stage I through      |                      |           |         |      |      |       |
| stage IV, or         |                      |           |         |      |      |       |
| unspecified(403.90), |                      |           |         |      |      |       |
|  FSGS (focal         |                      |           |         |      |      |       |
| segmental            |                      |           |         |      |      |       |
| glomerulosclerosis), |                      |           |         |      |      |       |
|  Hypothyroidism,     |                      |           |         |      |      |       |
| Hyperlipidemia       |                      |           |         |      |      |       |
+----------------------+----------------------+-----------+---------+------+------+-------+
|   aspirin 81 mg EC   | Take 81 mg by mouth  |           | 0       |      |      | Activ |
| tablet               | Daily.               |           |         |      |      | e     |
+----------------------+----------------------+-----------+---------+------+------+-------+
|   insulin lispro     | inject               |   10 vial | 11      | 11/ |      | Activ |
| (HUMALOG) 100        | subcutaneously       |           |         | 5/20 |      | e     |
| units/mL injection   | BEFORE MEALS         |           |         | 17   |      |       |
| (vial)Indications:   | ACCORDING TO SLIDING |           |         |      |      |       |
| Type 2 diabetes      |  SCALE Max dose 20   |           |         |      |      |       |
| mellitus with        | units daily          |           |         |      |      |       |
| complication, with   |                      |           |         |      |      |       |
| long-term current    |                      |           |         |      |      |       |
| use of insulin (HCC) |                      |           |         |      |      |       |
+----------------------+----------------------+-----------+---------+------+------+-------+
 
 
 
+---+----------------------+
|   | Additional           |
|   | informationPatient   |
|   | taking differently:  |
|   | inject               |
|   | subcutaneously       |
|   | BEFORE MEALS         |
|   | ACCORDING TO SLIDING |
|   |  SCALE Max dose 8    |
|   | units daily,         |
|   | Reported on          |
 
|   | 2019 11:31 AM  |
+---+----------------------+
 
 
 
+----------------------+----------------------+----------+----+------+---+-------+
|   lisinopril         | take 1 tablet by     |   90     | 3  |  |   | Activ |
| (PRINIVIL,ZESTRIL)   | mouth once daily     | tablet   |    |  |   | e     |
| 30 MG tablet         |                      |          |    | 17   |   |       |
+----------------------+----------------------+----------+----+------+---+-------+
|   allopurinol        | take 1 tablet by     |   30     | 11 | 01/1 |   | Activ |
| (ZYLOPRIM) 100 mg    | mouth once daily     | tablet   |    | 20 |   | e     |
| tablet               |                      |          |    | 18   |   |       |
+----------------------+----------------------+----------+----+------+---+-------+
|   losartan (COZAAR)  | take 1 tablet by     |   90     | 3  | 01/2 |   | Activ |
| 50 mg tablet         | mouth once daily     | tablet   |    |  |   | e     |
|                      |                      |          |    | 18   |   |       |
+----------------------+----------------------+----------+----+------+---+-------+
|   Prenatal Vit-Fe    | take 1 tablet by     |   30     | 11 | 08/0 |   | Activ |
| Fumarate-FA (PNV     | mouth once daily.    | tablet   |    | 3/20 |   | e     |
| PRENATAL PLUS        |                      |          |    | 18   |   |       |
| MULTIVITAMIN) 27-1   |                      |          |    |      |   |       |
| MG TABS              |                      |          |    |      |   |       |
+----------------------+----------------------+----------+----+------+---+-------+
|   Respiratory        | Continue nocturnal   |   1 each | 0  | 08/ |   | Activ |
| Therapy Supplies     | O2 at 2 l/m through  |          |    | 20 |   | e     |
| MISCIndications: JACK | CPAP for lifetime.   |          |    | 18   |   |       |
|  (obstructive sleep  | Dx: JACK G47.33       |          |    |      |   |       |
| apnea)               | Please provide new   |          |    |      |   |       |
|                      | nasal mask for CPAP  |          |    |      |   |       |
|                      | and any other needed |          |    |      |   |       |
|                      |  replacement         |          |    |      |   |       |
|                      | supplies.            |          |    |      |   |       |
+----------------------+----------------------+----------+----+------+---+-------+
|   levothyroxine      | take 1 tablet by     |   30     | 11 | 08/3 |   | Activ |
| (SYNTHROID) 50 mcg   | mouth once daily     | tablet   |    | 1/20 |   | e     |
| tablet               |                      |          |    | 18   |   |       |
+----------------------+----------------------+----------+----+------+---+-------+
|   rosuvastatin       | take 1 tablet by     |   30     | 11 | 08/3 |   | Activ |
| (CRESTOR) 20 mg      | mouth NIGHTLY        | tablet   |    | 1/20 |   | e     |
| tablet               |                      |          |    | 18   |   |       |
+----------------------+----------------------+----------+----+------+---+-------+
|   furosemide (LASIX) | Take 1 tablet by     |   30     | 11 | 08/3 |   | Activ |
|  40 mg               | mouth Daily as       | tablet   |    | 1/20 |   | e     |
| tabletIndications:   | needed.              |          |    | 18   |   |       |
| Edema, unspecified   |                      |          |    |      |   |       |
| type, FSGS (focal    |                      |          |    |      |   |       |
| segmental            |                      |          |    |      |   |       |
| glomerulosclerosis), |                      |          |    |      |   |       |
|  Hyperlipidemia      |                      |          |    |      |   |       |
+----------------------+----------------------+----------+----+------+---+-------+
|   B-D INS SYRINGE    | USE BEFORE MEALS AS  |   1 each | 11 | 09/0 |   | Activ |
| 0.5CC/31GX5/16 31G X | DIRECTED             |          |    |  |   | e     |
|  16" 0.5 ML MISC   |                      |          |    | 18   |   |       |
+----------------------+----------------------+----------+----+------+---+-------+
|   magnesium oxide    | take 1 tablet by     |   60     | 11 | 10/0 |   | Activ |
| (MAG-OX) 400 mg      | mouth twice a day    | tablet   |    | 2/20 |   | e     |
| tablet               |                      |          |    | 18   |   |       |
+----------------------+----------------------+----------+----+------+---+-------+
|   predniSONE         | take 1 tablet by     |   90     | 3  | 10/0 |   | Activ |
 
| (DELTASONE) 5 mg     | mouth once daily     | tablet   |    | 2/20 |   | e     |
| tablet               |                      |          |    | 18   |   |       |
+----------------------+----------------------+----------+----+------+---+-------+
 
 
 
+---+----------------------+
|   | Additional           |
|   | informationPatient   |
|   | not taking. Reported |
|   |  on 2019  2:33 |
|   |  PM                  |
+---+----------------------+
 
 
 
+----------------------+----------------------+-----------+----+------+---+-------+
|   insulin glargine   | inject 20 units      |   10 vial | 11 | 11/0 |   | Activ |
| (LANTUS) 100         | subcutaneously every |           |    | 9/20 |   | e     |
| units/mL injection   |  morning             |           |    | 18   |   |       |
| (vial)Indications:   |                      |           |    |      |   |       |
| Type 2 diabetes      |                      |           |    |      |   |       |
| mellitus with        |                      |           |    |      |   |       |
| chronic kidney       |                      |           |    |      |   |       |
| disease, without     |                      |           |    |      |   |       |
| long-term current    |                      |           |    |      |   |       |
| use of insulin,      |                      |           |    |      |   |       |
| unspecified CKD      |                      |           |    |      |   |       |
| stage (HCC), Kidney  |                      |           |    |      |   |       |
| replaced by          |                      |           |    |      |   |       |
| transplant           |                      |           |    |      |   |       |
+----------------------+----------------------+-----------+----+------+---+-------+
|   cinacalcet         | take 1 tablet by     |   90      | 3  | 03/0 |   | Activ |
| (SENSIPAR) 30 mg     | mouth once daily     | tablet    |    | /20 |   | e     |
| tablet               |                      |           |    | 19   |   |       |
+----------------------+----------------------+-----------+----+------+---+-------+
|   fludrocortisone    | Take 1 tablet by     |   45      | 3  | 07/0 |   | Activ |
| (FLORINEF) 0.1 mg    | mouth Every other    | tablet    |    | 20 |   | e     |
| tablet               | day.                 |           |    | 19   |   |       |
+----------------------+----------------------+-----------+----+------+---+-------+
|   cyclobenzaprine    | Take 1 tablet by     |   45      | 0  | 07/3 |   | Activ |
| (FLEXERIL) 10 mg     | mouth Twice  daily   | tablet    |    | 20 |   | e     |
| tablet               | as needed for Muscle |           |    | 19   |   |       |
|                      |  spasms.             |           |    |      |   |       |
+----------------------+----------------------+-----------+----+------+---+-------+
 
 
 
+---+----------------------+
|   | Additional           |
|   | informationPatient   |
|   | not taking. Reported |
|   |  on 2019  2:33 |
|   |  PM                  |
+---+----------------------+
 
 
 
+----------------------+----------------------+---------+----+------+---+-------+
|   apixaban (ELIQUIS) | Take 1 tablet by     |   180   | 3  |  |   | Activ |
 
|  5 mg tablet         | mouth 2 times daily. | tablet  |    | 3/20 |   | e     |
|                      |                      |         |    | 19   |   |       |
+----------------------+----------------------+---------+----+------+---+-------+
|   tacrolimus         | Take 1 capsule by    |   60    | 11 |  |   | Activ |
| (PROGRAF) 1 mg       | mouth twice daily.   | capsule |    | /20 |   | e     |
| capsuleIndications:  |                      |         |    | 19   |   |       |
| Kidney replaced by   |                      |         |    |      |   |       |
| transplant           |                      |         |    |      |   |       |
+----------------------+----------------------+---------+----+------+---+-------+
 
 
 
+---+----------------------+
|   | Additional           |
|   | informationPatient   |
|   | not taking. Reported |
|   |  on 2019  2:33 |
|   |  PM                  |
+---+----------------------+
 
 
 
+----------------------+----------------------+---------+----+------+---+-------+
|   mycophenolate      | Take 1 capsule by    |   60    | 11 | 12/2 |   | Activ |
| (CELLCEPT) 250 mg    | mouth 2 times daily. | capsule |    | 0/20 |   | e     |
| capsuleIndications:  |                      |         |    | 19   |   |       |
| Kidney replaced by   |                      |         |    |      |   |       |
| transplant           |                      |         |    |      |   |       |
+----------------------+----------------------+---------+----+------+---+-------+
 
 
 
+---+----------------------+
|   | Additional           |
|   | informationPatient   |
|   | not taking. Reported |
|   |  on 2019  2:33 |
|   |  PM                  |
+---+----------------------+
 
 
 
+----------------------+----------------------+---------+----+------+------+-------+
|   mycophenolate      | Take 1 capsule by    |   60    | 11 | 12/0 | 12/2 | Disco |
| (CELLCEPT) 250 mg    | mouth 2 times daily. | capsule |    |  | 0/20 | ntinu |
| capsuleIndications:  |                      |         |    | 18   | 19   | ed    |
| Kidney replaced by   |                      |         |    |      |      |       |
| transplant           |                      |         |    |      |      |       |
+----------------------+----------------------+---------+----+------+------+-------+
 
 
 
 Active Problems
 
 
+-------------------------------------------------------------------+------------+
| Problem                                                           | Noted Date |
+-------------------------------------------------------------------+------------+
| NSVT (nonsustained ventricular tachycardia) (HCC) and ventricular | 2019 |
|  ectopy                                                           |            |
 
+-------------------------------------------------------------------+------------+
 
 
 
+---------------------------------------------------------------+
|   Overview:   Noted on mobile cardiac telemetry from 10/2019. |
+---------------------------------------------------------------+
 
 
 
+--------------------------------+------------+
| Persistent atrial fibrillation | 2019 |
+--------------------------------+------------+
 
 
 
+-------------------------------------------------------------------+
|   Overview:   Mobile cardiac telemetry from 10/16 until           |
| 10/30/2019. Baseline EKG show atrial fibrillation with heart rate |
|  ranging from 33 to 149 bpm. PVCs, ventricular couplets, 4 beat   |
| run nonsustained ventricular tachycardia was noted.   Episodes of |
|  rapid ventricular response with heart rate of149 bpm  was        |
| noted.Pauses up to 3.8-second were present. Findings suggest      |
| potential tacky/bradycardia syndrome.                             |
+-------------------------------------------------------------------+
 
 
 
+---------------------------------------------+------------+
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
+---------------------------------------------+------------+
| Thyroid activity decreased                  | 2016 |
+---------------------------------------------+------------+
| Renal transplant recipient                  | 2016 |
+---------------------------------------------+------------+
| UTI (lower urinary tract infection)         | 2015 |
+---------------------------------------------+------------+
 
 
 
+---------------------------------------------+
|   Overview:   Problem list replacer utility |
+---------------------------------------------+
 
 
 
+----------------------------------------+------------+
| PLMD (periodic limb movement disorder) | 2014 |
+----------------------------------------+------------+
 
 
 
+-------------------------------------------------------------------+
|   Overview:   PLMD index 30 on sleep study. Unclear if addressed. |
|                                                                   |
+-------------------------------------------------------------------+
 
 
 
+-------------------------+------------+
 
| Murmur                  | 04/15/2014 |
+-------------------------+------------+
| Cardiomegaly            | 04/15/2014 |
+-------------------------+------------+
| Hypertension, essential | 04/15/2014 |
+-------------------------+------------+
| Dyspnea                 | 2013 |
+-------------------------+------------+
 
 
 
+-------------------------------------------------------------------+
|   Overview:   Negative methacholine 13Echo 13 showed    |
| hypervolemia and could not assess pulmonary pressures.We have not |
|  done: Repeat chest CT or stress test. Chest CT done with         |
| previous Dr. Pham work up 3 years ago. Last stress test ?     |
+-------------------------------------------------------------------+
 
 
 
+--------------------------------------+---+
| Chronic low back pain                |   |
+--------------------------------------+---+
| Hypothyroidism                       |   |
+--------------------------------------+---+
| Mixed hyperlipidemia                 |   |
+--------------------------------------+---+
| Complications of transplanted kidney |   |
+--------------------------------------+---+
 
 
 
+------------------------------------+
|   Overview:   ICD-10 Record update |
+------------------------------------+
 
 
 
+-------------------+---+
| Movement disorder |   |
+-------------------+---+
 
 
 
+------------------------------------------+
|   Overview:   Tremor of unclear etiology |
+------------------------------------------+
 
 
 
+-----------------------------------------+---+
| Diabetes mellitus type II, uncontrolled |   |
+-----------------------------------------+---+
| Pulmonary hypertension                  |   |
+-----------------------------------------+---+
 
 
 
+-------------------------------------------------------------------+
|   Overview:   Echocardiogram from 02/19/10 revealed moderate      |
 
| bi-atrial dilatation and normal left ventricular size, wall       |
| thickness and motion, LVEF is 55-60%, dilated right ventricle     |
| with moderate hypo-kinesis, moderate tricuspid valve              |
| regurgitation, severe pulmonary hypertension with a peak systolic |
|  pressure of 80 mmHg, and a small pericardial effusion.           |
| Echocardiogram from 4/15/14 showed Mild left atrial dilatation.   |
| Normal left ventricular size, wall thickness and motion.          |
| Preserved left ventricular systolic function. LVEF is 70-75%.     |
| Grade 1 left ventricular diastole dysfunction. Mild tricuspid     |
| valve regurgitation. Mild thickened trileaflet aortic valve with  |
| adequate opening. A mild aortic valve insufficiency. Normal       |
| right-sided pressure.                                             |
+-------------------------------------------------------------------+
 
 
 
+--------------------------------------------------------------+---+
| Coronary artery disease involving native coronary artery of  |   |
| native heart without angina pectoris                         |   |
+--------------------------------------------------------------+---+
 
 
 
+-------------------------------------------------------------------+
|   Overview:   Formatting of this note might be different from the |
|  original.Status post CABG x 3 in  with LIMA to the LAD, SVG  |
| to the OM1 and OM2. Persantine sestamibi stress test on 07  |
| revealed a medium sized, moderate in severity fixed defect of the |
|  anterior wall, and a small sized mild in severity fixed defect   |
| of the inferior wall, LVEF by gated SPECT was 66%. Echo test      |
| completed on 10/30/191.  Mild biatrial dilatation.2.  Normal left |
|  ventricular size with a mild concentric left ventricular         |
| hypertrophy.  Left ventricular systolic function is preserved.    |
| LVEF is 55-60%.3.  Mildly thickened and calcified trileaflet      |
| aortic valve with adequate opening.  There is aortic valve        |
| sclerosis without significant aortic valve stenosis.4.  Mildly    |
| thickened and calcified mitral valve suggesting myxomatous        |
| change.  There is a mild mitral valve regurgitation.5.  Mild      |
| mitral annular calcification.6.  Mild tricuspid valve             |
| regurgitation.7.  Normal right-sided pressure.  8.  Dilated IVC   |
| with a normal respiratory collapse.9.  When compared to           |
| echocardiography on 2018, no significant changes.Bilateral   |
| heart catheterization on 05/03/10, shows severe two vessel        |
| coronary artery disease, a chronic occlusion through the proximal |
|  portion of the left anterior descending artery and a chronic     |
| occlusion through the proximal portion of the left circumflex     |
| artery, grafts: open left internal mammary artery graft to the    |
| mid left anterior descending artery, open saphenous vein grafts   |
| to the OM1 and OM2, mildly dilated left ventricular cavity with a |
|  normal left systolic function, LVEF 70%, mild to moderate        |
| pulmonary hypertension and a normal cardiac output, coronary      |
| circulation is right dominant, normal system pressure. Persantine |
|  nuclear medicine stress test 14 shows a normal myocardial   |
| perfusion imaging study with normal left ventricular size, wall   |
| thickness, LVEF by gated SPECT of 78%. Stress test completed on   |
| 10/30/2019 Persantine EKG is negative.2.  Normal Persantine       |
| sestamibi myocardial perfusion imaging study.  Normal left        |
| ventricular size, wall thickness and motion.  Preserved left      |
| ventricular systolic function.  LVEF by gated SPECT is 63%. NM    |
| Nuclear Stress Test 10/30/2019: Persantine EKG is negative.       |
 
| Normal Persantine sestamibi myocardial perfusion imaging study.   |
| Normal left ventricular size, wall thickness and motion.          |
| Preserved left ventricular systolic function.  LVEF by gated      |
| SPECT is 63%.Echocardiogram Completed 10/30/2019: Mild biatrial   |
| dilatation.  Normal left ventricular size with a mild concentric  |
| left ventricular hypertrophy.  Left ventricular systolic function |
|  is preserved.  LVEF is 55-60%.  Mildly thickened and calcified   |
| trileaflet aortic valve with adequate opening.  There is aortic   |
| valve sclerosis without significant aortic valve stenosis.        |
| Mildly thickened and calcified mitral valve suggesting myxomatous |
|  change.  There is a mild mitral valve regurgitation.  Mild       |
| mitral annular calcification.  Mild tricuspid valve               |
| regurgitation.  Normal right-sided pressure. Dilated IVC with a   |
| normal respiratory collapse.  When compared to echocardiography   |
| on 2018, no significant changes.                             |
+-------------------------------------------------------------------+
 
 
 
+-------------+---+
| JACK on CPAP |   |
+-------------+---+
 
 
 
+------------------------------------------------------+
|   Overview:   AHI 67.9                               |
| On ResMed S9 auto CPAP [id number] with 2LPM bleed in |
+------------------------------------------------------+
 
 
 
+-------------------------------------------+---+
| Obesity hypoventilation syndrome          |   |
+-------------------------------------------+---+
| Kidney replaced by transplant             |   |
+-------------------------------------------+---+
| Secondary hyperparathyroidism             |   |
+-------------------------------------------+---+
| FSGS (focal segmental glomerulosclerosis) |   |
+-------------------------------------------+---+
| Chest pain                                |   |
+-------------------------------------------+---+
 
 
 
+-------------------------------------------------------------------+
|   Overview:   Nuclear stress test 2014 Persantine EKG is     |
| negative. Normal Persantine Sestamibi myocardial perfusion study  |
| with a normal left ventricular size and wall thickness. Preserved |
|  left ventricular systolic function. LVEF by gated SPECT 78%.     |
+-------------------------------------------------------------------+
 
 
 
 Resolved Problems
 
 
+--------------------------------------------------------------+----------+-----------+
| Problem                                                      | Noted    | Resolved  |
 
|                                                              | Date     | Date      |
+--------------------------------------------------------------+----------+-----------+
| Hypervolemia                                                 | 20 |  |
|                                                              | 13       | 4         |
+--------------------------------------------------------------+----------+-----------+
| Cough                                                        | 20 |  |
|                                                              | 13       | 4         |
+--------------------------------------------------------------+----------+-----------+
| Unspecified hypertensive kidney disease with chronic kidney  | 20 |  |
| disease stage I through stage IV, or unspecified(403.90)     | 12       | 5         |
+--------------------------------------------------------------+----------+-----------+
 
 
 
 Encounters
 
 
+--------+-------------+-------------+----------------------+----------------------+
| Date   | Type        | Specialty   | Care Team            | Description          |
+--------+-------------+-------------+----------------------+----------------------+
| / | Office      | Nephrology  |   Marzena Moser  | Kidney replaced by   |
| 2019   | Visit       |             | M, DO                | transplant (Primary  |
|        |             |             |                      | Dx); Hypertension,   |
|        |             |             |                      | essential;           |
|        |             |             |                      | Persistent atrial    |
|        |             |             |                      | fibrillation; Type 2 |
|        |             |             |                      |  DM with CKD stage 2 |
|        |             |             |                      |  and hypertension    |
|        |             |             |                      | ([facility])                |
+--------+-------------+-------------+----------------------+----------------------+
| / | Refill      | Nephrology  |   Marzena Moser  | Medication Refill    |
|    |             |             | M, DO                |                      |
+--------+-------------+-------------+----------------------+----------------------+
| / | Abstract    | Nephrology  |   Marzena Moser  |                      |
| 2019   |             |             | M, DO                |                      |
+--------+-------------+-------------+----------------------+----------------------+
| / | Orders Only | Cardiology  |   Popeye Townsend, | Pulmonary            |
| 2019   |             |             |  MD                  | hypertension (HCC)   |
|        |             |             |                      | (Primary Dx);        |
|        |             |             |                      | Shortness of breath  |
+--------+-------------+-------------+----------------------+----------------------+
| / | Refill      | Nephrology  |   Marzena Moser  | Medication Refill    |
|    |             |             | M, DO                |                      |
+--------+-------------+-------------+----------------------+----------------------+
| / | Office      | Cardiology  |   Tonia Wilson,    | Pulmonary            |
|    | Visit       |             | ARNP                 | hypertension ([facility])   |
|        |             |             |                      | (Primary Dx);        |
|        |             |             |                      | Coronary artery      |
|        |             |             |                      | disease involving    |
|        |             |             |                      | native coronary      |
|        |             |             |                      | artery of native     |
|        |             |             |                      | heart without angina |
|        |             |             |                      |  pectoris; Murmur;   |
|        |             |             |                      | Cardiomegaly;        |
|        |             |             |                      | Hypertension,        |
|        |             |             |                      | essential; Chest     |
|        |             |             |                      | pain, unspecified    |
|        |             |             |                      | type; Mixed          |
|        |             |             |                      | hyperlipidemia;      |
|        |             |             |                      | Persistent atrial    |
 
|        |             |             |                      | fibrillation; NSVT   |
|        |             |             |                      | (nonsustained        |
|        |             |             |                      | ventricular          |
|        |             |             |                      | tachycardia) ([facility])   |
|        |             |             |                      | and ventricular      |
|        |             |             |                      | ectopy               |
+--------+-------------+-------------+----------------------+----------------------+
| 10/31/ | Documentati | Nephrology  |   Marzena Moser  |                      |
|    | on          |             | M, DO                |                      |
+--------+-------------+-------------+----------------------+----------------------+
| 10/31/ | Telephone   | Cardiology  |   Tonia Wilson,    | Results              |
|    |             |             | ARNP                 |                      |
+--------+-------------+-------------+----------------------+----------------------+
| 10/30/ | Hospital    | Radiology   |   Tonia Wilson,    | Coronary artery      |
|    | Encounter   |             | ARNP                 | disease involving    |
|        |             |             |                      | native coronary      |
|        |             |             |                      | artery of native     |
|        |             |             |                      | heart without angina |
|        |             |             |                      |  pectoris;           |
|        |             |             |                      | Hypertension,        |
|        |             |             |                      | essential; Mixed     |
|        |             |             |                      | hyperlipidemia; PVC  |
|        |             |             |                      | (premature           |
|        |             |             |                      | ventricular          |
|        |             |             |                      | contraction)         |
+--------+-------------+-------------+----------------------+----------------------+
| 10/30/ | Hospital    | Radiology   |   Tonia Wilson,    |                      |
|    | Encounter   |             | ARNP                 |                      |
+--------+-------------+-------------+----------------------+----------------------+
| 10/30/ | Hospital    | Radiology   |   Tonia Wilson,    | Coronary artery      |
|    | Encounter   |             | ARNP  Cardiologist,  | disease involving    |
|        |             |             | [facility]                  | native coronary      |
|        |             |             |                      | artery of native     |
|        |             |             |                      | heart without angina |
|        |             |             |                      |  pectoris;           |
|        |             |             |                      | Hypertension,        |
|        |             |             |                      | essential; Mixed     |
|        |             |             |                      | hyperlipidemia       |
+--------+-------------+-------------+----------------------+----------------------+
| 10/29/ | Telephone   | Cardiology  |   Tonia Wilson,    | Patrice (montior and   |
2019   |             |             | ARNP                 | test)                |
+--------+-------------+-------------+----------------------+----------------------+
| 10/24/ | Telephone   | Cardiology  |   Tonia Wilson,    | Other (stop          |
2019   |             |             | ARNP                 | metoprolol due to    |
|        |             |             |                      | pauses /             |
|        |             |             |                      | bradycardia)         |
+--------+-------------+-------------+----------------------+----------------------+
| 10/21/ | Telephone   | Cardiology  |   Tonia Wilson,    | Other (Pt qualified  |
2019   |             |             | ARNP                 | for Pulm Rehab )     |
+--------+-------------+-------------+----------------------+----------------------+
| 10/21/ | Telephone   | Cardiology  |   Popeye Townsend, | Other (monitor       |
2019   |             |             |  MD                  | report )             |
+--------+-------------+-------------+----------------------+----------------------+
| 10/16/ | Hospital    | Radiology   |   Tonia Wilson,    | Coronary artery      |
| 2019   | Encounter   |             | ARNP                 | disease involving    |
|        |             |             |                      | native coronary      |
|        |             |             |                      | artery of native     |
|        |             |             |                      | heart without angina |
|        |             |             |                      |  pectoris;           |
|        |             |             |                      | Hypertension,        |
 
|        |             |             |                      | essential; Mixed     |
|        |             |             |                      | hyperlipidemia; PVC  |
|        |             |             |                      | (premature           |
|        |             |             |                      | ventricular          |
|        |             |             |                      | contraction);        |
|        |             |             |                      | Permanent atrial     |
|        |             |             |                      | fibrillation         |
+--------+-------------+-------------+----------------------+----------------------+
| 10/15/ | Telephone   | Cardiology  |   Tonia Wilson,    | Other                |
|    |             |             | ARNP                 |                      |
+--------+-------------+-------------+----------------------+----------------------+
| 10/14/ | Telephone   | Pulmonology |   Heydi Kirkland         | Appointment          |
|    |             |             | MD Onofre          |                      |
+--------+-------------+-------------+----------------------+----------------------+
| 10/10/ | Office      | Cardiology  |   Tonia Wilson,    | Coronary artery      |
| 2019   | Visit       |             | ARNP                 | disease involving    |
|        |             |             |                      | native coronary      |
|        |             |             |                      | artery of native     |
|        |             |             |                      | heart without angina |
|        |             |             |                      |  pectoris (Primary   |
|        |             |             |                      | Dx); Hypertension,   |
|        |             |             |                      | essential; Mixed     |
|        |             |             |                      | hyperlipidemia; PVC  |
|        |             |             |                      | (premature           |
|        |             |             |                      | ventricular          |
|        |             |             |                      | contraction)         |
+--------+-------------+-------------+----------------------+----------------------+
| 10/10/ | Orders Only | Cardiology  |   Popeye Townsend, | CHF (congestive      |
| 2019   |             |             |  MD                  | heart failure), NYHA |
|        |             |             |                      |  class I, chronic,   |
|        |             |             |                      | diastolic (HCC)      |
|        |             |             |                      | (Primary Dx)         |
+--------+-------------+-------------+----------------------+----------------------+
 from Last 3 Months
 
 Immunizations
 
 
+----------------------+----------------------+----------+
| Name                 | Administration Dates | Next Due |
+----------------------+----------------------+----------+
| INFLUENZA PF 18 Y OR | 2012           |          |
|  >,TRIVALENT         |                      |          |
| RECOMBINANT          |                      |          |
+----------------------+----------------------+----------+
| INFLUENZA TRIV       | 2009           |          |
| W/PRES(PED/ADOL/AUGIE |                      |          |
| T),MULTIDOSE         |                      |          |
+----------------------+----------------------+----------+
 
 
 
 Family History
 
 
+---------------------+----------+------+-------------------------------------------+
| Medical History     | Relation | Name | Comments                                  |
+---------------------+----------+------+-------------------------------------------+
| Other (see comment) | Brother  |      | paralyzed in accident and then  from  |
|                     |          |      | complications                             |
 
+---------------------+----------+------+-------------------------------------------+
| Heart disease       | Father   |      |                                           |
+---------------------+----------+------+-------------------------------------------+
| Parkinsonism        | Father   |      |                                           |
+---------------------+----------+------+-------------------------------------------+
| Ovarian cancer      | Mother   |      |                                           |
+---------------------+----------+------+-------------------------------------------+
 
 
 
+----------+-------+----------+---------------------+
| Relation | Name  | Status   | Comments            |
+----------+-------+----------+---------------------+
| Brother  | Deric   |  | Car accident        |
|          |       |   (Age   |                     |
|          |       | 58)      |                     |
+----------+-------+----------+---------------------+
| Brother  | Ed    | Alive    | Healthy             |
+----------+-------+----------+---------------------+
| Brother  | [place] |  | Parkinson's disease |
|          |       |   (Age   |                     |
|          |       | 72)      |                     |
+----------+-------+----------+---------------------+
| Brother  |       |          |                     |
+----------+-------+----------+---------------------+
| Father   |       |  | Parkinson's, CHF    |
|          |       |   (Age   |                     |
|          |       | 67)      |                     |
+----------+-------+----------+---------------------+
| Mother   |       |  | Uterine cancer      |
|          |       |   (Age   |                     |
|          |       | 83)      |                     |
+----------+-------+----------+---------------------+
 
 
 
 Social History
 
 
+--------------+-------+-----------+--------+------+
| Tobacco Use  | Types | Packs/Day | Years  | Date |
|              |       |           | Used   |      |
+--------------+-------+-----------+--------+------+
| Never Smoker |       |           |        |      |
+--------------+-------+-----------+--------+------+
 
 
 
+---------------------+---+---+---+
| Smokeless Tobacco:  |   |   |   |
| Never Used          |   |   |   |
+---------------------+---+---+---+
 
 
 
+---------------------------------------------------------------+
| Comments: some second hand smoke exposure, but fairly minimal |
+---------------------------------------------------------------+
 
 
 
 
+-------------+-------------+---------+----------+
| Alcohol Use | Drinks/Week | oz/Week | Comments |
+-------------+-------------+---------+----------+
| No          |             |         |          |
+-------------+-------------+---------+----------+
 
 
 
+------------------+---------------+
| Sex Assigned at  | Date Recorded |
| Birth            |               |
+------------------+---------------+
| Not on file      |               |
+------------------+---------------+
 
 
 
+----------------+-------------+-------------+
| Job Start Date | Occupation  | Industry    |
+----------------+-------------+-------------+
| Not on file    | Not on file | Not on file |
+----------------+-------------+-------------+
 
 
 
+----------------+--------------+------------+
| Travel History | Travel Start | Travel End |
+----------------+--------------+------------+
 
 
 
+-------------------------------------+
| No recent travel history available. |
+-------------------------------------+
 
 
 
 Last Filed Vital Signs
 
 
+-------------------+-------------------+----------------------+----------+
| Vital Sign        | Reading           | Time Taken           | Comments |
+-------------------+-------------------+----------------------+----------+
| Blood Pressure    | 150/80            | 2019  3:00 PM  |          |
|                   |                   | PST                  |          |
+-------------------+-------------------+----------------------+----------+
| Pulse             | 68                | 2019  3:00 PM  | apical   |
|                   |                   | PST                  |          |
+-------------------+-------------------+----------------------+----------+
| Temperature       | 35.6   C (96   F) | 2019  3:00 PM  |          |
|                   |                   | PST                  |          |
+-------------------+-------------------+----------------------+----------+
| Respiratory Rate  | 18                | 2019 11:44 AM  |          |
|                   |                   | PST                  |          |
+-------------------+-------------------+----------------------+----------+
| Oxygen Saturation | 96%               | 2019 11:44 AM  |          |
|                   |                   | PST                  |          |
+-------------------+-------------------+----------------------+----------+
| Inhaled Oxygen    | -                 | -                    |          |
 
| Concentration     |                   |                      |          |
+-------------------+-------------------+----------------------+----------+
| Weight            | 115.7 kg (255 lb) | 2019  3:00 PM  |          |
|                   |                   | PST                  |          |
+-------------------+-------------------+----------------------+----------+
| Height            | 167.6 cm (5' 6")  | 2019 11:44 AM  |          |
|                   |                   | PST                  |          |
+-------------------+-------------------+----------------------+----------+
| Body Mass Index   | 41.16             | 2019 11:44 AM  |          |
|                   |                   | PST                  |          |
+-------------------+-------------------+----------------------+----------+
 
 
 
 Plan of Treatment
 
 
+--------+-----------+------------+----------------------+-------------------+
| Date   | Type      | Specialty  | Care Team            | Description       |
+--------+-----------+------------+----------------------+-------------------+
| / | Office    | Cardiology |   Tonia Wilson,    |                   |
|    | Visit     |            | ARNJESSICA  [address]   |                   |
|        |           |            | St  [place], [place]  |                   |
|        |           |            | [address]  [phone]  |                   |
|        |           |            |  [phone] (Fax)  |                   |
+--------+-----------+------------+----------------------+-------------------+
| / | Hospital  | Radiology  |   Popeye Townsend, |                   |
|    | Encounter |            |  MD  [address] |                   |
|        |           |            |  St.  [place],   |                   |
|        |           |            | [place] [address]             |                   |
|        |           |            | [phone]         |                   |
|        |           |            | [phone] (Fax)   |                   |
+--------+-----------+------------+----------------------+-------------------+
| / | Surgery   | Radiology  |   Popeye Townsend, | CV EP PPM SYSTEM  |
|    |           |            |  MD  [address] | IMPLANT           |
|        |           |            |  St.  [place],   |                   |
|        |           |            | [place] [address]             |                   |
|        |           |            | [phone]         |                   |
|        |           |            | [phone] (Fax)   |                   |
+--------+-----------+------------+----------------------+-------------------+
| 02/10/ | Clinical  | Cardiology |                      |                   |
|    | Support   |            |                      |                   |
+--------+-----------+------------+----------------------+-------------------+
| / | Office    | Cardiology |   Tonia Wilson,    |                   |
|    | Visit     |            | [place] [address]   |                   |
|        |           |            | [place]  |                   |
|        |           |            | [address]  [phone]  |                   |
|        |           |            |  [phone] (Fax)  |                   |
+--------+-----------+------------+----------------------+-------------------+
| / | Off-Site  | Nephrology |   Marzena Moser  |                   |
2020   | Visit     |            | DO ETIENNE  [address] [place] [address]      |                   |
|        |           |            | [address]      |                   |
|        |           |            | [place]      |                   |
|        |           |            | [address]  [phone]  |                   |
|        |           |            |  [phone] (Fax)  |                   |
+--------+-----------+------------+----------------------+-------------------+
 
 
 
+----------------------+-----------+---------------------------------+----------+
 
| Health Maintenance   | Due Date  | Last Done                       | Comments |
+----------------------+-----------+---------------------------------+----------+
| Hepatitis C          |  |                                 |          |
| Screening            | 6         |                                 |          |
+----------------------+-----------+---------------------------------+----------+
| Vaccine:             |  |                                 |          |
| Dtap/Tdap/Td (1 -    | 7         |                                 |          |
| Tdap)                |           |                                 |          |
+----------------------+-----------+---------------------------------+----------+
| Diabetic Eye Exam    |  |                                 |          |
|                      | 4         |                                 |          |
+----------------------+-----------+---------------------------------+----------+
| Diabetic Foot Exam   |  |                                 |          |
|                      | 4         |                                 |          |
+----------------------+-----------+---------------------------------+----------+
| Colorectal Cancer    |  |                                 |          |
| Screening            | 6         |                                 |          |
| (Colonoscopy)        |           |                                 |          |
+----------------------+-----------+---------------------------------+----------+
| Vaccine: Zoster (1   |  |                                 |          |
| of 2)                | 6         |                                 |          |
+----------------------+-----------+---------------------------------+----------+
| Breast Cancer        |  | 2000                      |          |
| Screening            | 2         |                                 |          |
+----------------------+-----------+---------------------------------+----------+
| Vaccine:             |  |                                 |          |
| Pneumococcal 65+ (1  | 1         |                                 |          |
| of 2 - PCV13)        |           |                                 |          |
+----------------------+-----------+---------------------------------+----------+
| Adult Annual         |  |                                 |          |
| Wellness Visit       | 5         |                                 |          |
+----------------------+-----------+---------------------------------+----------+
| Vaccine: Influenza   |  | 2012, 2009          |          |
| (#1)                 | 9         |                                 |          |
+----------------------+-----------+---------------------------------+----------+
| Hemoglobin A1c       |  | 2019, 2019,         |          |
| Screening            | 0         | 2019, Additional history  |          |
|                      |           | exists                          |          |
+----------------------+-----------+---------------------------------+----------+
 
 
 
 Procedures
 
 
+----------------------+--------+-------------+----------------------+----------------------
+
| Procedure Name       | Priori | Date/Time   | Associated Diagnosis | Comments             
|
|                      | ty     |             |                      |                      
|
+----------------------+--------+-------------+----------------------+----------------------
+
| LABS - EXTERNAL SCAN |        | 2019  |                      |   Results for this   
|
|                      |        | 12:00 AM    |                      | procedure are in the 
|
|                      |        | PST         |                      |  results section.    
|
+----------------------+--------+-------------+----------------------+----------------------
 
+
| EXTERNAL LAB:        | Routin | 2019  |                      |   Results for this   
|
| HEMOGLOBIN A1C       | e      |             |                      | procedure are in the 
|
|                      |        |             |                      |  results section.    
|
+----------------------+--------+-------------+----------------------+----------------------
+
| TACROLIMUS TROUGH    | Routin | 2019  |                      |   Results for this   
|
| LC-MS/MS             | e      |             |                      | procedure are in the 
|
|                      |        |             |                      |  results section.    
|
+----------------------+--------+-------------+----------------------+----------------------
+
| HEMOGLOBIN A1C       | Routin | 2019  |                      |   Results for this   
|
|                      | e      |             |                      | procedure are in the 
|
|                      |        |             |                      |  results section.    
|
+----------------------+--------+-------------+----------------------+----------------------
+
| EXTERNAL LAB: BUN    | Routin | 2019  |                      |   Results for this   
|
|                      | e      |             |                      | procedure are in the 
|
|                      |        |             |                      |  results section.    
|
+----------------------+--------+-------------+----------------------+----------------------
+
| EXTERNAL LAB:        | Routin | 2019  |                      |   Results for this   
|
| GLUCOSE              | e      |             |                      | procedure are in the 
|
|                      |        |             |                      |  results section.    
|
+----------------------+--------+-------------+----------------------+----------------------
+
| EXTERNAL LAB: ALT    | Routin | 2019  |                      |   Results for this   
|
|                      | e      |             |                      | procedure are in the 
|
|                      |        |             |                      |  results section.    
|
+----------------------+--------+-------------+----------------------+----------------------
+
| EXTERNAL LAB: AST    | Routin | 2019  |                      |   Results for this   
|
|                      | e      |             |                      | procedure are in the 
|
|                      |        |             |                      |  results section.    
|
+----------------------+--------+-------------+----------------------+----------------------
+
| EXTERNAL LAB:        | Routin | 2019  |                      |   Results for this   
|
| ALKALINE PHOSPHATASE | e      |             |                      | procedure are in the 
 
|
|                      |        |             |                      |  results section.    
|
+----------------------+--------+-------------+----------------------+----------------------
+
| EXTERNAL LAB:        | Routin | 2019  |                      |   Results for this   
|
| BILIRUBIN, TOTAL     | e      |             |                      | procedure are in the 
|
|                      |        |             |                      |  results section.    
|
+----------------------+--------+-------------+----------------------+----------------------
+
| EXTERNAL LAB:        | Routin | 2019  |                      |   Results for this   
|
| ALBUMIN              | e      |             |                      | procedure are in the 
|
|                      |        |             |                      |  results section.    
|
+----------------------+--------+-------------+----------------------+----------------------
+
| EXTERNAL LAB:        | Routin | 2019  |                      |   Results for this   
|
| PROTEIN, TOTAL       | e      |             |                      | procedure are in the 
|
|                      |        |             |                      |  results section.    
|
+----------------------+--------+-------------+----------------------+----------------------
+
| EXTERNAL LAB:        | Routin | 2019  |                      |   Results for this   
|
| PHOSPHORUS           | e      |             |                      | procedure are in the 
|
|                      |        |             |                      |  results section.    
|
+----------------------+--------+-------------+----------------------+----------------------
+
| EXTERNAL LAB:        | Routin | 2019  |                      |   Results for this   
|
| MAGNESIUM            | e      |             |                      | procedure are in the 
|
|                      |        |             |                      |  results section.    
|
+----------------------+--------+-------------+----------------------+----------------------
+
| EXTERNAL LAB:        | Routin | 2019  |                      |   Results for this   
|
| CALCIUM              | e      |             |                      | procedure are in the 
|
|                      |        |             |                      |  results section.    
|
+----------------------+--------+-------------+----------------------+----------------------
+
| EXTERNAL LAB: CARBON | Routin | 2019  |                      |   Results for this   
|
|  DIOXIDE             | e      |             |                      | procedure are in the 
|
|                      |        |             |                      |  results section.    
|
+----------------------+--------+-------------+----------------------+----------------------
 
+
| EXTERNAL LAB:        | Routin | 2019  |                      |   Results for this   
|
| CHLORIDE             | e      |             |                      | procedure are in the 
|
|                      |        |             |                      |  results section.    
|
+----------------------+--------+-------------+----------------------+----------------------
+
| EXTERNAL LAB:        | Routin | 2019  |                      |   Results for this   
|
| POTASSIUM            | e      |             |                      | procedure are in the 
|
|                      |        |             |                      |  results section.    
|
+----------------------+--------+-------------+----------------------+----------------------
+
| EXTERNAL LAB: SODIUM | Routin | 2019  |                      |   Results for this   
|
|                      | e      |             |                      | procedure are in the 
|
|                      |        |             |                      |  results section.    
|
+----------------------+--------+-------------+----------------------+----------------------
+
| EXTERNAL LAB: CBC    | Routin | 2019  |                      |   Results for this   
|
|                      | e      |             |                      | procedure are in the 
|
|                      |        |             |                      |  results section.    
|
+----------------------+--------+-------------+----------------------+----------------------
+
| EXTERNAL LAB: TSH    | Routin | 2019  |                      |   Results for this   
|
|                      | e      |             |                      | procedure are in the 
|
|                      |        |             |                      |  results section.    
|
+----------------------+--------+-------------+----------------------+----------------------
+
| EXTERNAL LAB:        | Routin | 2019  |                      |   Results for this   
|
| TRIGLYCERIDES        | e      |             |                      | procedure are in the 
|
|                      |        |             |                      |  results section.    
|
+----------------------+--------+-------------+----------------------+----------------------
+
| EXTERNAL LAB:        | Routin | 2019  |                      |   Results for this   
|
| CHOLESTEROL, HDL     | e      |             |                      | procedure are in the 
|
|                      |        |             |                      |  results section.    
|
+----------------------+--------+-------------+----------------------+----------------------
+
| EXTERNAL LAB:        | Routin | 2019  |                      |   Results for this   
|
| CHOLESTEROL, TOTAL   | e      |             |                      | procedure are in the 
 
|
|                      |        |             |                      |  results section.    
|
+----------------------+--------+-------------+----------------------+----------------------
+
| EXTERNAL LAB:        | Routin | 2019  |                      |   Results for this   
|
| CHOLESTEROL, LDL     | e      |             |                      | procedure are in the 
|
|                      |        |             |                      |  results section.    
|
+----------------------+--------+-------------+----------------------+----------------------
+
| EXTERNAL LAB: EGFR   | Routin | 2019  |                      |   Results for this   
|
|                      | e      |             |                      | procedure are in the 
|
|                      |        |             |                      |  results section.    
|
+----------------------+--------+-------------+----------------------+----------------------
+
| EXTERNAL LAB:        | Routin | 2019  |                      |   Results for this   
|
| CREATININE           | e      |             |                      | procedure are in the 
|
|                      |        |             |                      |  results section.    
|
+----------------------+--------+-------------+----------------------+----------------------
+
| ECG 12 LEAD          | Routin | 2019  |   Persistent atrial  |   Results for this   
|
|                      | e      | 12:02 PM    | fibrillation  NSVT   | procedure are in the 
|
|                      |        | PST         | (nonsustained        |  results section.    
|
|                      |        |             | ventricular          |                      
|
|                      |        |             | tachycardia) (HCC)   |                      
|
|                      |        |             | and ventricular      |                      
|
|                      |        |             | ectopy               |                      
|
+----------------------+--------+-------------+----------------------+----------------------
+
| MOBILE CARDIAC       | Routin | 2019  |   Coronary artery    |   Results for this   
|
| TELEMETRY (MCT)      | e      |  1:58 PM    | disease involving    | procedure are in the 
|
|                      |        | PST         | native coronary      |  results section.    
|
|                      |        |             | artery of native     |                      
|
|                      |        |             | heart without angina |                      
|
|                      |        |             |  pectoris            |                      
|
|                      |        |             | Hypertension,        |                      
|
|                      |        |             | essential  Mixed     |                      
 
|
|                      |        |             | hyperlipidemia  PVC  |                      
|
|                      |        |             | (premature           |                      
|
|                      |        |             | ventricular          |                      
|
|                      |        |             | contraction)         |                      
|
+----------------------+--------+-------------+----------------------+----------------------
+
| ECHO COMPLETE        | Routin | 10/30/2019  |   Coronary artery    |   Results for this   
|
|                      | e      |  5:07 PM    | disease involving    | procedure are in the 
|
|                      |        | PDT         | native coronary      |  results section.    
|
|                      |        |             | artery of native     |                      
|
|                      |        |             | heart without angina |                      
|
|                      |        |             |  pectoris            |                      
|
|                      |        |             | Hypertension,        |                      
|
|                      |        |             | essential  Mixed     |                      
|
|                      |        |             | hyperlipidemia  PVC  |                      
|
|                      |        |             | (premature           |                      
|
|                      |        |             | ventricular          |                      
|
|                      |        |             | contraction)         |                      
|
+----------------------+--------+-------------+----------------------+----------------------
+
| NM NUCLEAR STRESS    | Routin | 10/30/2019  |   Coronary artery    |   Results for this   
|
| TEST (PHARMACOLOGIC  | e      | 12:56 PM    | disease involving    | procedure are in the 
|
| - VASODILATOR)       |        | PDT         | native coronary      |  results section.    
|
|                      |        |             | artery of native     |                      
|
|                      |        |             | heart without angina |                      
|
|                      |        |             |  pectoris            |                      
|
|                      |        |             | Hypertension,        |                      
|
|                      |        |             | essential  Mixed     |                      
|
|                      |        |             | hyperlipidemia       |                      
|
+----------------------+--------+-------------+----------------------+----------------------
+
| ECG 12 LEAD          | Routin | 10/10/2019  |   Hypertension,      |   Results for this   
|
|                      | e      |  1:55 PM    | essential            | procedure are in the 
 
|
|                      |        | PDT         |                      |  results section.    
|
+----------------------+--------+-------------+----------------------+----------------------
+
 from Last 3 Months
 
 Results
 External Lab: ALMA (2019)
 
+-----------+--------+-----------+------------+--------------+
| Component | Value  | Ref Range | Performed  | Pathologist  |
|           |        |           | At         | Signature    |
+-----------+--------+-----------+------------+--------------+
| ALMA,      | 33 (A) | 6 - 23    | EXTERNAL   |              |
| External  |        |           | LAB        |              |
+-----------+--------+-----------+------------+--------------+
 
 
 
+----------------+---------+--------------------+--------------+
| Performing     | Address | City/State/Zipcode | Phone Number |
| Organization   |         |                    |              |
+----------------+---------+--------------------+--------------+
|   EXTERNAL LAB |         |                    |              |
+----------------+---------+--------------------+--------------+
 External Lab: Glucose (2019)
 
+-----------+-------+-----------+------------+--------------+
| Component | Value | Ref Range | Performed  | Pathologist  |
|           |       |           | At         | Signature    |
+-----------+-------+-----------+------------+--------------+
| Glucose,  | 94    | 70 - 100  | EXTERNAL   |              |
| External  |       |           | LAB        |              |
+-----------+-------+-----------+------------+--------------+
 
 
 
+----------------+---------+--------------------+--------------+
| Performing     | Address | City/State/Zipcode | Phone Number |
| Organization   |         |                    |              |
+----------------+---------+--------------------+--------------+
|   EXTERNAL LAB |         |                    |              |
+----------------+---------+--------------------+--------------+
 External Lab: ALT (2019)
 
+-----------+-------+-----------+------------+--------------+
| Component | Value | Ref Range | Performed  | Pathologist  |
|           |       |           | At         | Signature    |
+-----------+-------+-----------+------------+--------------+
| ALT,      | 18    | 7 - 52    | EXTERNAL   |              |
| External  |       |           | LAB        |              |
+-----------+-------+-----------+------------+--------------+
 
 
 
+----------------+---------+--------------------+--------------+
| Performing     | Address | City/State/Zipcode | Phone Number |
| Organization   |         |                    |              |
+----------------+---------+--------------------+--------------+
 
|   EXTERNAL LAB |         |                    |              |
+----------------+---------+--------------------+--------------+
 External Lab: AST (2019)
 
+-----------+-------+-----------+------------+--------------+
| Component | Value | Ref Range | Performed  | Pathologist  |
|           |       |           | At         | Signature    |
+-----------+-------+-----------+------------+--------------+
| AST,      | 32    | 13 - 39   | EXTERNAL   |              |
| External  |       |           | LAB        |              |
+-----------+-------+-----------+------------+--------------+
 
 
 
+----------------+---------+--------------------+--------------+
| Performing     | Address | City/State/Zipcode | Phone Number |
| Organization   |         |                    |              |
+----------------+---------+--------------------+--------------+
|   EXTERNAL LAB |         |                    |              |
+----------------+---------+--------------------+--------------+
 External Lab: Alkaline Phosphatase (2019)
 
+-----------+-------+-----------+------------+--------------+
| Component | Value | Ref Range | Performed  | Pathologist  |
|           |       |           | At         | Signature    |
+-----------+-------+-----------+------------+--------------+
| ALP,      | 72    | 31 - 130  | EXTERNAL   |              |
| External  |       |           | LAB        |              |
+-----------+-------+-----------+------------+--------------+
 
 
 
+----------------+---------+--------------------+--------------+
| Performing     | Address | City/State/Zipcode | Phone Number |
| Organization   |         |                    |              |
+----------------+---------+--------------------+--------------+
|   EXTERNAL LAB |         |                    |              |
+----------------+---------+--------------------+--------------+
 External Lab: Bilirubin, Total (2019)
 
+-------------+-------+-----------+------------+--------------+
| Component   | Value | Ref Range | Performed  | Pathologist  |
|             |       |           | At         | Signature    |
+-------------+-------+-----------+------------+--------------+
| Bilirubin,  | 0.7   | 0 - 1.2   | EXTERNAL   |              |
| Total,      |       |           | LAB        |              |
| External    |       |           |            |              |
+-------------+-------+-----------+------------+--------------+
 
 
 
+----------------+---------+--------------------+--------------+
| Performing     | Address | City/State/Zipcode | Phone Number |
| Organization   |         |                    |              |
+----------------+---------+--------------------+--------------+
|   EXTERNAL LAB |         |                    |              |
+----------------+---------+--------------------+--------------+
 External Lab: Albumin (2019)
 
+-----------+-------+-----------+------------+--------------+
 
| Component | Value | Ref Range | Performed  | Pathologist  |
|           |       |           | At         | Signature    |
+-----------+-------+-----------+------------+--------------+
| Albumin,  | 3.6   | 3.5 - 5   | EXTERNAL   |              |
| External  |       |           | LAB        |              |
+-----------+-------+-----------+------------+--------------+
 
 
 
+----------------+---------+--------------------+--------------+
| Performing     | Address | City/State/Zipcode | Phone Number |
| Organization   |         |                    |              |
+----------------+---------+--------------------+--------------+
|   EXTERNAL LAB |         |                    |              |
+----------------+---------+--------------------+--------------+
 External Lab: Protein, Total (2019)
 
+-----------+---------+-----------+------------+--------------+
| Component | Value   | Ref Range | Performed  | Pathologist  |
|           |         |           | At         | Signature    |
+-----------+---------+-----------+------------+--------------+
| Protein,  | 5.7 (A) | 6 - 8.3   | EXTERNAL   |              |
| Total,    |         |           | LAB        |              |
| External  |         |           |            |              |
+-----------+---------+-----------+------------+--------------+
 
 
 
+----------------+---------+--------------------+--------------+
| Performing     | Address | City/State/Zipcode | Phone Number |
| Organization   |         |                    |              |
+----------------+---------+--------------------+--------------+
|   EXTERNAL LAB |         |                    |              |
+----------------+---------+--------------------+--------------+
 External Lab: Phosphorus (2019)
 
+-------------+-------+-----------+------------+--------------+
| Component   | Value | Ref Range | Performed  | Pathologist  |
|             |       |           | At         | Signature    |
+-------------+-------+-----------+------------+--------------+
| Phosphorus, | 2.9   | 2.5 - 5   | EXTERNAL   |              |
|  External   |       |           | LAB        |              |
+-------------+-------+-----------+------------+--------------+
 
 
 
+----------------+---------+--------------------+--------------+
| Performing     | Address | City/State/Zipcode | Phone Number |
| Organization   |         |                    |              |
+----------------+---------+--------------------+--------------+
|   EXTERNAL LAB |         |                    |              |
+----------------+---------+--------------------+--------------+
 External Lab: Magnesium (2019)
 
+-------------+-------+-----------+------------+--------------+
| Component   | Value | Ref Range | Performed  | Pathologist  |
|             |       |           | At         | Signature    |
+-------------+-------+-----------+------------+--------------+
| Magnesium,  | 2     | 1.7 - 2.5 | EXTERNAL   |              |
| External    |       |           | LAB        |              |
 
+-------------+-------+-----------+------------+--------------+
 
 
 
+----------------+---------+--------------------+--------------+
| Performing     | Address | City/State/Zipcode | Phone Number |
| Organization   |         |                    |              |
+----------------+---------+--------------------+--------------+
|   EXTERNAL LAB |         |                    |              |
+----------------+---------+--------------------+--------------+
 External Lab: Calcium (2019)
 
+-----------+----------+------------+------------+--------------+
| Component | Value    | Ref Range  | Performed  | Pathologist  |
|           |          |            | At         | Signature    |
+-----------+----------+------------+------------+--------------+
| Calcium,  | 10.4 (A) | 8.5 - 10.3 | EXTERNAL   |              |
| External  |          |            | LAB        |              |
+-----------+----------+------------+------------+--------------+
 
 
 
+----------------+---------+--------------------+--------------+
| Performing     | Address | City/State/Zipcode | Phone Number |
| Organization   |         |                    |              |
+----------------+---------+--------------------+--------------+
|   EXTERNAL LAB |         |                    |              |
+----------------+---------+--------------------+--------------+
 External Lab: Carbon Dioxide (2019)
 
+-----------+-------+-----------+------------+--------------+
| Component | Value | Ref Range | Performed  | Pathologist  |
|           |       |           | At         | Signature    |
+-----------+-------+-----------+------------+--------------+
| Carbon    | 21    | 19 - 31   | EXTERNAL   |              |
| Dioxide,  |       |           | LAB        |              |
| External  |       |           |            |              |
+-----------+-------+-----------+------------+--------------+
 
 
 
+----------------+---------+--------------------+--------------+
| Performing     | Address | City/State/Zipcode | Phone Number |
| Organization   |         |                    |              |
+----------------+---------+--------------------+--------------+
|   EXTERNAL LAB |         |                    |              |
+----------------+---------+--------------------+--------------+
 External Lab: Chloride (2019)
 
+------------+-------+-----------+------------+--------------+
| Component  | Value | Ref Range | Performed  | Pathologist  |
|            |       |           | At         | Signature    |
+------------+-------+-----------+------------+--------------+
| Chloride,  | 111   | 95 - 112  | EXTERNAL   |              |
| External   |       |           | LAB        |              |
+------------+-------+-----------+------------+--------------+
 
 
 
+----------------+---------+--------------------+--------------+
 
| Performing     | Address | City/State/Zipcode | Phone Number |
| Organization   |         |                    |              |
+----------------+---------+--------------------+--------------+
|   EXTERNAL LAB |         |                    |              |
+----------------+---------+--------------------+--------------+
 External Lab: Potassium (2019)
 
+-------------+-------+-----------+------------+--------------+
| Component   | Value | Ref Range | Performed  | Pathologist  |
|             |       |           | At         | Signature    |
+-------------+-------+-----------+------------+--------------+
| Potassium,  | 4.7   | 3.6 - 5.1 | EXTERNAL   |              |
| External    |       |           | LAB        |              |
+-------------+-------+-----------+------------+--------------+
 
 
 
+----------------+---------+--------------------+--------------+
| Performing     | Address | City/State/Zipcode | Phone Number |
| Organization   |         |                    |              |
+----------------+---------+--------------------+--------------+
|   EXTERNAL LAB |         |                    |              |
+----------------+---------+--------------------+--------------+
 External Lab: Sodium (2019)
 
+-----------+-------+-----------+------------+--------------+
| Component | Value | Ref Range | Performed  | Pathologist  |
|           |       |           | At         | Signature    |
+-----------+-------+-----------+------------+--------------+
| Sodium,   | 143   | 132 - 143 | EXTERNAL   |              |
| External  |       |           | LAB        |              |
+-----------+-------+-----------+------------+--------------+
 
 
 
+----------------+---------+--------------------+--------------+
| Performing     | Address | City/State/Zipcode | Phone Number |
| Organization   |         |                    |              |
+----------------+---------+--------------------+--------------+
|   EXTERNAL LAB |         |                    |              |
+----------------+---------+--------------------+--------------+
 External Lab: CBC (2019)
 
+-----------+----------+-----------+------------+--------------+
| Component | Value    | Ref Range | Performed  | Pathologist  |
|           |          |           | At         | Signature    |
+-----------+----------+-----------+------------+--------------+
| WBC,      | 4.8      | 4.5 - 11  | EXTERNAL   |              |
| External  |          |           | LAB        |              |
+-----------+----------+-----------+------------+--------------+
| HGB,      | 13       | 12 - 16   | EXTERNAL   |              |
| External  |          |           | LAB        |              |
+-----------+----------+-----------+------------+--------------+
| HCT,      | 40.3     | 35 - 45   | EXTERNAL   |              |
| External  |          |           | LAB        |              |
+-----------+----------+-----------+------------+--------------+
| PLT,      | 177      | 140 - 440 | EXTERNAL   |              |
| External  |          |           | LAB        |              |
+-----------+----------+-----------+------------+--------------+
| RBC,      | 3.75 (A) | 3.8 - 5.1 | EXTERNAL   |              |
 
| External  |          |           | LAB        |              |
+-----------+----------+-----------+------------+--------------+
| MCV,      | 107 (A)  | 81 - 99   | EXTERNAL   |              |
| External  |          |           | LAB        |              |
+-----------+----------+-----------+------------+--------------+
| RDW,      | 14.4     | 10.5 - 15 | EXTERNAL   |              |
| External  |          |           | LAB        |              |
+-----------+----------+-----------+------------+--------------+
 
 
 
+----------------+---------+--------------------+--------------+
| Performing     | Address | City/State/Zipcode | Phone Number |
| Organization   |         |                    |              |
+----------------+---------+--------------------+--------------+
|   EXTERNAL LAB |         |                    |              |
+----------------+---------+--------------------+--------------+
 Tacrolimus Trough, LC-MS/MS (2019)
 
+-------------+-------+-----------+------------+--------------+
| Component   | Value | Ref Range | Performed  | Pathologist  |
|             |       |           | At         | Signature    |
+-------------+-------+-----------+------------+--------------+
| Tacrolimus, | 5.5   |           |            |              |
|  LC-MS/MS,  |       |           |            |              |
| External    |       |           |            |              |
+-------------+-------+-----------+------------+--------------+
 
 
 
+----------+
| Specimen |
+----------+
| Blood    |
+----------+
 External Lab: TSH (2019)
 
+-----------+-------+------------+------------+--------------+
| Component | Value | Ref Range  | Performed  | Pathologist  |
|           |       |            | At         | Signature    |
+-----------+-------+------------+------------+--------------+
| TSH,      | 1.69  | 0.27 - 4.2 | EXTERNAL   |              |
| External  |       |            | LAB        |              |
+-----------+-------+------------+------------+--------------+
 
 
 
+----------+
| Specimen |
+----------+
| Blood    |
+----------+
 
 
 
+----------------+---------+--------------------+--------------+
| Performing     | Address | City/State/Zipcode | Phone Number |
| Organization   |         |                    |              |
+----------------+---------+--------------------+--------------+
|   EXTERNAL LAB |         |                    |              |
 
+----------------+---------+--------------------+--------------+
 External Lab: Triglycerides (2019)
 
+-------------+-------+-----------+------------+--------------+
| Component   | Value | Ref Range | Performed  | Pathologist  |
|             |       |           | At         | Signature    |
+-------------+-------+-----------+------------+--------------+
| Triglycerid | 122   | 30 - 150  | EXTERNAL   |              |
| es,         |       |           | LAB        |              |
| External    |       |           |            |              |
+-------------+-------+-----------+------------+--------------+
 
 
 
+----------+
| Specimen |
+----------+
| Blood    |
+----------+
 
 
 
+----------------+---------+--------------------+--------------+
| Performing     | Address | City/State/Zipcode | Phone Number |
| Organization   |         |                    |              |
+----------------+---------+--------------------+--------------+
|   EXTERNAL LAB |         |                    |              |
+----------------+---------+--------------------+--------------+
 External Lab: Cholesterol, HDL (2019)
 
+-------------+-------+-----------+------------+--------------+
| Component   | Value | Ref Range | Performed  | Pathologist  |
|             |       |           | At         | Signature    |
+-------------+-------+-----------+------------+--------------+
| HDL         | 50.4  | 40 mg/dl  | EXTERNAL   |              |
| Cholesterol |       |           | LAB        |              |
| , External  |       |           |            |              |
+-------------+-------+-----------+------------+--------------+
 
 
 
+----------+
| Specimen |
+----------+
| Blood    |
+----------+
 
 
 
+----------------+---------+--------------------+--------------+
| Performing     | Address | City/State/Zipcode | Phone Number |
| Organization   |         |                    |              |
+----------------+---------+--------------------+--------------+
|   EXTERNAL LAB |         |                    |              |
+----------------+---------+--------------------+--------------+
 External Lab: Cholesterol, Total (2019)
 
+-------------+-------+-----------+------------+--------------+
| Component   | Value | Ref Range | Performed  | Pathologist  |
|             |       |           | At         | Signature    |
 
+-------------+-------+-----------+------------+--------------+
| Cholesterol | 113   | 200 mg/dl | EXTERNAL   |              |
| , Total,    |       |           | LAB        |              |
| External    |       |           |            |              |
+-------------+-------+-----------+------------+--------------+
 
 
 
+----------+
| Specimen |
+----------+
| Blood    |
+----------+
 
 
 
+----------------+---------+--------------------+--------------+
| Performing     | Address | City/State/Zipcode | Phone Number |
| Organization   |         |                    |              |
+----------------+---------+--------------------+--------------+
|   EXTERNAL LAB |         |                    |              |
+----------------+---------+--------------------+--------------+
 External Lab: Cholesterol, LDL (2019)
 
+-------------+-------+-----------+------------+--------------+
| Component   | Value | Ref Range | Performed  | Pathologist  |
|             |       |           | At         | Signature    |
+-------------+-------+-----------+------------+--------------+
| LDL         | 38    | 100       | EXTERNAL   |              |
| Cholesterol |       |           | LAB        |              |
| , Direct,   |       |           |            |              |
| External    |       |           |            |              |
+-------------+-------+-----------+------------+--------------+
 
 
 
+----------+
| Specimen |
+----------+
| Blood    |
+----------+
 
 
 
+----------------+---------+--------------------+--------------+
| Performing     | Address | City/State/Zipcode | Phone Number |
| Organization   |         |                    |              |
+----------------+---------+--------------------+--------------+
|   EXTERNAL LAB |         |                    |              |
+----------------+---------+--------------------+--------------+
 External Lab: eGFR (2019)
 
+-----------+-------+-----------+------------+--------------+
| Component | Value | Ref Range | Performed  | Pathologist  |
|           |       |           | At         | Signature    |
+-----------+-------+-----------+------------+--------------+
| eGFR,     | 41    |           | EXTERNAL   |              |
| External  |       |           | LAB        |              |
+-----------+-------+-----------+------------+--------------+
 
 
 
 
+----------+
| Specimen |
+----------+
| Blood    |
+----------+
 
 
 
+----------------+---------+--------------------+--------------+
| Performing     | Address | City/State/Zipcode | Phone Number |
| Organization   |         |                    |              |
+----------------+---------+--------------------+--------------+
|   EXTERNAL LAB |         |                    |              |
+----------------+---------+--------------------+--------------+
 External Lab: Creatinine (2019)
 
+-------------+----------+------------+------------+--------------+
| Component   | Value    | Ref Range  | Performed  | Pathologist  |
|             |          |            | At         | Signature    |
+-------------+----------+------------+------------+--------------+
| Creatinine, | 1.29 (A) | 0.7 - 1.18 | EXTERNAL   |              |
|  External   |          |            | LAB        |              |
+-------------+----------+------------+------------+--------------+
 
 
 
+----------+
| Specimen |
+----------+
| Blood    |
+----------+
 
 
 
+----------------+---------+--------------------+--------------+
| Performing     | Address | City/State/Zipcode | Phone Number |
| Organization   |         |                    |              |
+----------------+---------+--------------------+--------------+
|   EXTERNAL LAB |         |                    |              |
+----------------+---------+--------------------+--------------+
 External Lab: Hemoglobin A1c (2019)
 
+-------------+-------+-----------+------------+--------------+
| Component   | Value | Ref Range | Performed  | Pathologist  |
|             |       |           | At         | Signature    |
+-------------+-------+-----------+------------+--------------+
| Hemoglobin  | 6.4   | %         |            |              |
| A1c,        |       |           |            |              |
| external    |       |           |            |              |
+-------------+-------+-----------+------------+--------------+
 
 
 
+----------+
| Specimen |
+----------+
| Blood    |
+----------+
 
 LABS - EXTERNAL SCAN (2019 12:00 AM PST)
 
+------------------------------------------------------------------------+--------------+
| Narrative                                                              | Performed At |
+------------------------------------------------------------------------+--------------+
|   This result has an attachment that is not available.  Ordered by an  |              |
| unspecified provider.                                                  |              |
+------------------------------------------------------------------------+--------------+
 Hemoglobin A1C (2019)
 
+-------------+-------+-----------+------------+--------------+
| Component   | Value | Ref Range | Performed  | Pathologist  |
|             |       |           | At         | Signature    |
+-------------+-------+-----------+------------+--------------+
| Hemoglobin  | 6.4   | %         | EXTERNAL   |              |
| A1c         |       |           | LAB        |              |
+-------------+-------+-----------+------------+--------------+
 
 
 
+----------+
| Specimen |
+----------+
| Blood    |
+----------+
 
 
 
+----------------+---------+--------------------+--------------+
| Performing     | Address | City/State/Zipcode | Phone Number |
| Organization   |         |                    |              |
+----------------+---------+--------------------+--------------+
|   EXTERNAL LAB |         |                    |              |
+----------------+---------+--------------------+--------------+
 ECG 12 lead (2019 12:02 PM PST)Only the most recent of 2 results within the time [place]
od is included.
 
+-------------+--------------------------+-----------+------------+--------------+
| Component   | Value                    | Ref Range | Performed  | Pathologist  |
|             |                          |           | At         | Signature    |
+-------------+--------------------------+-----------+------------+--------------+
| VENTRICULAR | 78                       | BPM       | [facility]  |              |
|  RATE EKG   |                          |           |            |              |
+-------------+--------------------------+-----------+------------+--------------+
| ATRIAL RATE | 113                      | BPM       | [facility]  |              |
+-------------+--------------------------+-----------+------------+--------------+
| QRS         | 102                      | ms        | [facility]  |              |
| DURATION    |                          |           |            |              |
+-------------+--------------------------+-----------+------------+--------------+
| Q-T         | 374                      | ms        | [facility]  |              |
| INTERVAL    |                          |           |            |              |
+-------------+--------------------------+-----------+------------+--------------+
| Q-T         | 426                      | ms        | [facility]  |              |
| INTERVAL    |                          |           |            |              |
| (CORRECTED) |                          |           |            |              |
+-------------+--------------------------+-----------+------------+--------------+
| QRS AXIS    | -26                      | degrees   | [facility]  |              |
+-------------+--------------------------+-----------+------------+--------------+
| T AXIS      | 64                       | degrees   | [facility]  |              |
+-------------+--------------------------+-----------+------------+--------------+
 
| INTERPRETAT | Atrial fibrillation with |           | [facility]  |              |
| ION TEXT    |  premature ventricular   |           |            |              |
|             | or aberrantly conducted  |           |            |              |
|             | complexesAnterior        |           |            |              |
|             | infarct (cited on or     |           |            |              |
|             | before                   |           |            |              |
|             | 10-OCT-2019)Abnormal     |           |            |              |
|             | ECGWhen compared with    |           |            |              |
|             | ECG of 10-OCT-2019       |           |            |              |
|             | 13:55,Previous ECG has   |           |            |              |
|             | undetermined rhythm,     |           |            |              |
|             | needs reviewNonspecific  |           |            |              |
|             | T wave abnormality no    |           |            |              |
|             | longer evident in        |           |            |              |
|             | Inferior leadsConfirmed  |           |            |              |
|             | by RYLAN ONEILL MD     |           |            |              |
|             | ([id number]) on 2019    |           |            |              |
|             | 5:55:27 PM               |           |            |              |
+-------------+--------------------------+-----------+------------+--------------+
 
 
 
+----------+
| Specimen |
+----------+
|          |
+----------+
 
 
 
+----------------------------------------------------------+--------------+
| Narrative                                                | Performed At |
+----------------------------------------------------------+--------------+
|   This result has an attachment that is not available.   |              |
+----------------------------------------------------------+--------------+
 
 
 
+--------------+---------+--------------------+--------------+
| Performing   | Address | City/State/Zipcode | Phone Number |
| Organization |         |                    |              |
+--------------+---------+--------------------+--------------+
|   [facility]  |         |                    |              |
+--------------+---------+--------------------+--------------+
 Mobile Cardiac Telemetry (2019  1:58 PM PST)
 
+------------------------------------------------------------------------+--------------+
| Narrative                                                              | Performed At |
+------------------------------------------------------------------------+--------------+
|   Popeye Townsend MD       2019 14:04  Mobile cardiac telemetry |   [facility]  |
|  from 10/16 until 10/30/2019.  Baseline EKG show atrial fibrillation   |              |
| with heart rate ranging   from 33 to 149 bpm.  PVCs, ventricular       |              |
| couplets, 4 beat run nonsustained ventricular   tachycardia was noted. |              |
|      Episodes of rapid ventricular response with heart rate of149 bpm  |              |
|   was noted.  Pauses up to 3.8-second were present.  Findings suggest  |              |
| potential tacky/bradycardia syndrome.                                  |              |
+------------------------------------------------------------------------+--------------+
 
 
 
 
+--------------+---------+--------------------+--------------+
| Performing   | Address | City/State/Zipcode | Phone Number |
| Organization |         |                    |              |
+--------------+---------+--------------------+--------------+
|   [facility]  |         |                    |              |
+--------------+---------+--------------------+--------------+
 ECHO Complete (10/30/2019  5:07 PM PDT)
 
+-------------+---------+-----------+-------------+--------------+
| Component   | Value   | Ref Range | Performed   | Pathologist  |
|             |         |           | At          | Signature    |
+-------------+---------+-----------+-------------+--------------+
| Patient     | 262 lbs |           | [facility] |              |
| Weight      |         |           |             |              |
| (lbs)       |         |           |             |              |
+-------------+---------+-----------+-------------+--------------+
| Patient     | 5'6     |           | [facility] |              |
| Height      |         |           |             |              |
+-------------+---------+-----------+-------------+--------------+
| LVIDd       | 4.13    | cm        | [facility] |              |
+-------------+---------+-----------+-------------+--------------+
| FS          | 28      | %         | [facility] |              |
+-------------+---------+-----------+-------------+--------------+
| LA volume   | 94.38   | mL        | [facility] |              |
+-------------+---------+-----------+-------------+--------------+
| Ascending   | 3.33    | cm        | [facility] |              |
| aorta       |         |           |             |              |
+-------------+---------+-----------+-------------+--------------+
| Aortic arch | 2.24    | cm        | [facility] |              |
+-------------+---------+-----------+-------------+--------------+
| IVRT        | 107.27  | msec      | [facility] |              |
+-------------+---------+-----------+-------------+--------------+
| LVOT peak   | 77.14   | cm/s      | [facility] |              |
| travon         |         |           |             |              |
+-------------+---------+-----------+-------------+--------------+
| AV peak travon | 139.85  | cm/s      | [facility] |              |
+-------------+---------+-----------+-------------+--------------+
| AV peak     | 7.82    | mmHg      | [facility] |              |
| gradient    |         |           |             |              |
+-------------+---------+-----------+-------------+--------------+
| LA Volume   | 42      | mL/m2     | [facility] |              |
| Index       |         |           |             |              |
+-------------+---------+-----------+-------------+--------------+
| AV LVOT     | 2.38    | mmHg      | [facility] |              |
| Peak        |         |           |             |              |
| Gradient    |         |           |             |              |
+-------------+---------+-----------+-------------+--------------+
| TR Peak     | 18      | mmHg      | [facility] |              |
| Gradient    |         |           |             |              |
+-------------+---------+-----------+-------------+--------------+
| TR Velocity | 214.5   | cm        | [facility] |              |
+-------------+---------+-----------+-------------+--------------+
| LV          | 7.19    | cm        | [facility] |              |
| Diastolic   |         |           |             |              |
| Length 4C   |         |           |             |              |
+-------------+---------+-----------+-------------+--------------+
| LV          | 50      | %         | [facility] |              |
| Moran's   |         |           |             |              |
| Biplane EF  |         |           |             |              |
+-------------+---------+-----------+-------------+--------------+
 
| LV ED       | 45.92   | ml        | [facility] |              |
| Volume      |         |           |             |              |
| (Moran's) |         |           |             |              |
+-------------+---------+-----------+-------------+--------------+
| LV ED       | 20      | ml/m2     | [facility] |              |
| Volume      |         |           |             |              |
| Index       |         |           |             |              |
+-------------+---------+-----------+-------------+--------------+
| LV ES       | 23.42   | ml        | [facility] |              |
| Volume      |         |           |             |              |
+-------------+---------+-----------+-------------+--------------+
| MV          | 197.48  | msec      | [facility] |              |
| Deceleratio |         |           |             |              |
| n Time      |         |           |             |              |
+-------------+---------+-----------+-------------+--------------+
| MV Peak     | 81.53   | cm/s      | [facility] |              |
| E-Wave      |         |           |             |              |
+-------------+---------+-----------+-------------+--------------+
| LA/Aorta    | 1.19    |           | [facility] |              |
| Ratio       |         |           |             |              |
+-------------+---------+-----------+-------------+--------------+
| LA Area     | 27.42   | cm2       | [facility] |              |
+-------------+---------+-----------+-------------+--------------+
| LV ES       | 10      | ml/m2     | [facility] |              |
| Volume      |         |           |             |              |
| Index       |         |           |             |              |
+-------------+---------+-----------+-------------+--------------+
| Aortic Root | 3.7     | cm        | [facility] |              |
|  Diameter   |         |           |             |              |
+-------------+---------+-----------+-------------+--------------+
| IVS         | 1.15    | cm        | [facility] |              |
| Diastolic   |         |           |             |              |
| Thickness   |         |           |             |              |
| MM          |         |           |             |              |
+-------------+---------+-----------+-------------+--------------+
| LVPW        | 1.21    | cm        | [facility] |              |
| Diastolic   |         |           |             |              |
| Thickness   |         |           |             |              |
| MM          |         |           |             |              |
+-------------+---------+-----------+-------------+--------------+
| IVS         | 1.29    | cm        | [facility] |              |
| Systolic    |         |           |             |              |
| Thickness   |         |           |             |              |
| MM          |         |           |             |              |
+-------------+---------+-----------+-------------+--------------+
| LV Systolic | 2.97    | cm        | [facility] |              |
|  Diameter   |         |           |             |              |
| MM          |         |           |             |              |
+-------------+---------+-----------+-------------+--------------+
| LVPW        | 1.65    | cm        | [facility] |              |
| Systolic    |         |           |             |              |
| Thickness   |         |           |             |              |
| MM          |         |           |             |              |
+-------------+---------+-----------+-------------+--------------+
| AV Cusp     | 1.61    | cm        | [facility] |              |
| Seperation  |         |           |             |              |
| MM          |         |           |             |              |
+-------------+---------+-----------+-------------+--------------+
| LA Systolic | 4.4     | cm        | [facility] |              |
|  Diameter   |         |           |             |              |
 
| MM          |         |           |             |              |
+-------------+---------+-----------+-------------+--------------+
| LVEF-TTE    | 55      | %         | [facility] |              |
| TRANSTHORAC |         |           |             |              |
| IC ECHO     |         |           |             |              |
+-------------+---------+-----------+-------------+--------------+
| RA PRESSURE | 8       | mmHg      | [facility] |              |
+-------------+---------+-----------+-------------+--------------+
| RVSP        | 26      | mmHg      | [facility] |              |
| Estimated   |         |           |             |              |
+-------------+---------+-----------+-------------+--------------+
 
 
 
+----------+
| Specimen |
+----------+
|          |
+----------+
 
 
 
+------------------------------------------------------------------------+---------------+
| Narrative                                                              | Performed At  |
+------------------------------------------------------------------------+---------------+
|   This result has an attachment that is not available.  1.   Mild      |   [facility] |
| biatrial dilatation.2.   Normal left ventricular size with a mild      |               |
| concentric left ventricular hypertrophy.   Left ventricular systolic   |               |
| function is preserved.   LVEF is 55-60%.3.   Mildly thickened and      |               |
| calcified trileaflet aortic valve with adequate opening.   There is    |               |
| aortic valve sclerosis without significant aortic valve stenosis.4.    |               |
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
| change.   There is a mild mitral valve regurgitation.5.   Mild mitral  |               |
| annular calcification.6.   Mild tricuspid valve regurgitation.7.       |               |
|   Normal right-sided pressure.   8.   Dilated IVC with a normal        |               |
| respiratory collapse.9.   When compared to echocardiography on         |               |
| 2018, no significant changes.                                     |               |
|8.   Dilated IVC with a normal respiratory collapse.                    |               |
|9.   When compared to echocardiography on 2018, no significant    |               |
|changes.                                                               |               |
+------------------------------------------------------------------------+---------------+
 
 
 
+---------------+---------+--------------------+--------------+
| Performing    | Address | City/State[facility] | Phone Number |
| Organization  |         |                    |              |
+---------------+---------+--------------------+--------------+
|   [facility] |         |                    |              |
+---------------+---------+--------------------+--------------+
 NM Nuclear Stress Test (Vasodilator) (10/30/2019 12:56 PM PDT)
 
+-------------+--------+-----------+-------------+--------------+
| Component   | Value  | Ref Range | Performed   | Pathologist  |
|             |        |           | At          | Signature    |
+-------------+--------+-----------+-------------+--------------+
| BASELINE    | 93     | bpm       | [facility] |              |
| HEART RATE  |        |           |             |              |
+-------------+--------+-----------+-------------+--------------+
| BASELINE    | 99/51  | mmHg      | [facility] |              |
 
| BLOOD       |        |           |             |              |
| PRESSURE    |        |           |             |              |
+-------------+--------+-----------+-------------+--------------+
| PEAK HEART  | 109    |           | [facility] |              |
| RATE        |        |           |             |              |
+-------------+--------+-----------+-------------+--------------+
| PEAK BLOOD  | 111/42 | mmHG      | [facility] |              |
| PRESSURE    |        |           |             |              |
+-------------+--------+-----------+-------------+--------------+
| Target HR   | 126    |           | [facility] |              |
+-------------+--------+-----------+-------------+--------------+
| Percent HR  | 74     |           | [facility] |              |
+-------------+--------+-----------+-------------+--------------+
| LVEF-SPECT  | 63     | %         | [facility] |              |
| NUCLEAR     |        |           |             |              |
| STRESS/VIAB |        |           |             |              |
| ILITY       |        |           |             |              |
+-------------+--------+-----------+-------------+--------------+
| ST          | 0.0    | mm        | [facility] |              |
| Elevation   |        |           |             |              |
| (mm)        |        |           |             |              |
+-------------+--------+-----------+-------------+--------------+
 
 
 
+----------+
| Specimen |
+----------+
|          |
+----------+
 
 
 
+----------------------------------------------------------------------+---------------+
| Narrative                                                            | Performed At  |
+----------------------------------------------------------------------+---------------+
|   This result has an attachment that is not available.  1.           |   [facility] |
|   Persantine EKG is negative.2.   Normal Persantine sestamibi        |               |
| myocardial perfusion imaging study.   Normal left ventricular size,  |               |
| wall thickness and motion.   Preserved left ventricular systolic     |               |
| function.   LVEF by gated SPECT is 63%.                              |               |
+----------------------------------------------------------------------+---------------+
 
 
 
+---------------+---------+--------------------+--------------+
| Performing    | Address | City/State/Zipcode | Phone Number |
| Organization  |         |                    |              |
+---------------+---------+--------------------+--------------+
|   [facility] |         |                    |              |
+---------------+---------+--------------------+--------------+
 from Last 3 Months
 
 Insurance
 
 
+-----------------+--------+-------------+--------+-------------+---------+--------+
| Payer           | Benefi | Subscriber  | Effect | Phone       | Address | Type   |
|                 | t Plan | ID          | jony    |             |         |        |
|                 |  /     |             | Dates  |             |         |        |
 
|                 | Group  |             |        |             |         |        |
+-----------------+--------+-------------+--------+-------------+---------+--------+
| MEDICARE        | MEDICA | [id number] | 3/1/20 | [phone] |         | [employer] |
|                 | RE     |             | 10-Pre | 5           |         | re     |
|                 | PART A |             | sent   |             |         |        |
|                 |  AND B |             |        |             |         |        |
+-----------------+--------+-------------+--------+-------------+---------+--------+
| [employer] [place] | MUTUAL | [id number]    |  | [phone] |         | Indemn |
|                 |  OF    |             | 016-Pr | 0           |         | ity    |
|                 | [place]  |             | esent  |             |         |        |
+-----------------+--------+-------------+--------+-------------+---------+--------+
 
 
 
+----------------+--------+-------------+--------+-------------+---------------------+
| Guarantor Name | [employer] | Relation to | Date   | Phone       | Billing Address     |
|                | t Type |  Patient    | of     |             |                     |
|                |        |             | Birth  |             |                     |
+----------------+--------+-------------+--------+-------------+---------------------+
| Viviana Ricci Deedee | Person | Self        | / |             |   [address]   |
|                | al/Fam |             | 1945   | [phone] | [place] [id number] |
|                | carole    |             |        | 0 (Home)    |                     |
+----------------+--------+-------------+--------+-------------+---------------------+
 
 
 
 Advance Directives
 
 
+-----------+-----------------+----------------+-------------+
| Type      | Date Recorded   | Patient        | Explanation |
|           |                 | Representative |             |
+-----------+-----------------+----------------+-------------+
| Power of  |                 |                |             |
|   |                 |                |             |
+-----------+-----------------+----------------+-------------+
| Advance   | 8/15/2018 12:10 |                |             |
| Directive |  PM             |                |             |
+-----------+-----------------+----------------+-------------+

## 2020-01-08 NOTE — XMS
Encounter Summary
  Created on: 2020
 
 Viviana Ricci
 External Reference #: [id number]
 : 47
 Sex: Female
 
 Demographics
 
 
+-----------------------+----------------------+
| Address               | [address]      |
|                       | [place]  [address] |
+-----------------------+----------------------+
| Home Phone            | [phone]      |
+-----------------------+----------------------+
| Preferred Language    | Unknown              |
+-----------------------+----------------------+
| Marital Status        | Single               |
+-----------------------+----------------------+
| Sabianism Affiliation | [id number]                 |
+-----------------------+----------------------+
| Race                  | Unknown              |
+-----------------------+----------------------+
| Ethnic Group          | Unknown              |
+-----------------------+----------------------+
 
 
 Author
 
 
+--------------+--------------------------------------------+
| Author       | [facility]  |
|              | and [place]                                |
+--------------+--------------------------------------------+
| Organization | [facility]  |
|              | and [place]                                |
+--------------+--------------------------------------------+
| Address      | Unknown                                    |
+--------------+--------------------------------------------+
| Phone        | Unavailable                                |
+--------------+--------------------------------------------+
 
 
 
 Support
 
 
+----------------+--------------+---------------------+-----------------+
| Name           | Relationship | Address             | Phone           |
+----------------+--------------+---------------------+-----------------+
| Ada/Ed Radhames | ECON         | [address]              | [phone] |
|                |              | [place]  |                 |
|                |              |  [address]              |                 |
+----------------+--------------+---------------------+-----------------+
 
 
 
 
 Care Team Providers
 
 
+------------------------+------+-----------------+
| Care Team Member Name  | Role | Phone           |
+------------------------+------+-----------------+
| Marzena Moser DO | PCP  | [phone] |
+------------------------+------+-----------------+
 
 
 
 Reason for Visit
 
 
+-------------+----------+
| Reason      | Comments |
+-------------+----------+
| Medication  |          |
| Management  |          |
+-------------+----------+
 
 
 
 Encounter Details
 
 
+--------+-----------+---------------------+----------------------+-------------+
| Date   | Type      | Department          | Care Team            | Description |
+--------+-----------+---------------------+----------------------+-------------+
| / | Telephone |   [facility] [place]         |   ChengmaxxMarzena  | Medication  |
| 2018   |           | NEPHROLOGY  [address]   | M, DO  [address]      | Management  |
|        |           | [address]   | [address]      |             |
|        |           | [place], [place]     | [place], [place]      |             |
|        |           | [address]          | [address]  [phone]  |             |
|        |           | [phone]        |  [phone] (Fax)  |             |
+--------+-----------+---------------------+----------------------+-------------+
 
 
 
 Social History
 
 
+--------------+-------+-----------+--------+------+
| Tobacco Use  | Types | Packs/Day | Years  | Date |
|              |       |           | Used   |      |
+--------------+-------+-----------+--------+------+
| Never Smoker |       |           |        |      |
+--------------+-------+-----------+--------+------+
 
 
 
+---------------------+---+---+---+
| Smokeless Tobacco:  |   |   |   |
| Never Used          |   |   |   |
+---------------------+---+---+---+
 
 
 
+---------------------------------------------------------------+
 
| Comments: some second hand smoke exposure, but fairly minimal |
+---------------------------------------------------------------+
 
 
 
+-------------+-------------+---------+----------+
| Alcohol Use | Drinks/Week | oz/Week | Comments |
+-------------+-------------+---------+----------+
| No          |             |         |          |
+-------------+-------------+---------+----------+
 
 
 
+------------------+---------------+
| Sex Assigned at  | Date Recorded |
| Birth            |               |
+------------------+---------------+
| Not on file      |               |
+------------------+---------------+
 
 
 
+----------------+-------------+-------------+
| Job Start Date | Occupation  | Industry    |
+----------------+-------------+-------------+
| Not on file    | Not on file | Not on file |
+----------------+-------------+-------------+
 
 
 
+----------------+--------------+------------+
| Travel History | Travel Start | Travel End |
+----------------+--------------+------------+
 
 
 
+-------------------------------------+
| No recent travel history available. |
+-------------------------------------+
 documented as of this encounter
 
 Plan of Treatment
 
 
+--------+-----------+------------+----------------------+-------------------+
| Date   | Type      | Specialty  | Care Team            | Description       |
+--------+-----------+------------+----------------------+-------------------+
| / | Office    | Cardiology |   RakeshmaxxArlen fuentesa,    |                   |
|    | Visit     |            | ARNP  [address]   |                   |
|        |           |            | St [place], [place]  |                   |
|        |           |            | [address]  [phone]  |                   |
|        |           |            |  [phone] (Fax)  |                   |
+--------+-----------+------------+----------------------+-------------------+
| / | Hospital  | Radiology  |   Popeye Townsend, |                   |
|    | Encounter |            |  MD  [address] |                   |
|        |           |            |  [place],   |                   |
|        |           |            | [place] [address]             |                   |
|        |           |            | [phone]         |                   |
|        |           |            | [phone] (Fax)   |                   |
+--------+-----------+------------+----------------------+-------------------+
 
| / | Surgery   | Radiology  |   Popeye Townsend, | CV EP PPM SYSTEM  |
|    |           |            |  MD  [address] | IMPLANT           |
|        |           |            |  St.  [place],   |                   |
|        |           |            | [place] [address]             |                   |
|        |           |            | [phone]         |                   |
|        |           |            | [phone] (Fax)   |                   |
+--------+-----------+------------+----------------------+-------------------+
| 02/10/ | Clinical  | Cardiology |                      |                   |
|    | Support   |            |                      |                   |
+--------+-----------+------------+----------------------+-------------------+
| / | Office    | Cardiology |   Katie Tonia,    |                   |
|    | Visit     |            | ARNP  [address]   |                   |
|        |           |            | [place]  |                   |
|        |           |            | [address]  [phone]  |                   |
|        |           |            |  [phone] (Fax)  |                   |
+--------+-----------+------------+----------------------+-------------------+
| / | Off-Site  | Nephrology |   Marzena Moser  |                   |
|    | Visit     |            | DO ETIENNE  [address] [place] [address]      |                   |
|        |           |            | [address]      |                   |
|        |           |            | [place]      |                   |
|        |           |            | [address]  [phone]  |                   |
|        |           |            |  [phone] (Fax)  |                   |
+--------+-----------+------------+----------------------+-------------------+
 documented as of this encounter
 
 Visit Diagnoses
 
 
+---------------------------------+
| Diagnosis                       |
+---------------------------------+
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
+---------------------------------+
 documented in this encounter

## 2020-01-08 NOTE — XMS
Encounter Summary
  Created on: 2020
 
 Viviana Ricci
 External Reference #: [id number]
 : 47
 Sex: Female
 
 Demographics
 
 
+-----------------------+----------------------+
| Address               | [address]      |
|                       | [place]  [address] |
+-----------------------+----------------------+
| Home Phone            | [phone]      |
+-----------------------+----------------------+
| Preferred Language    | Unknown              |
+-----------------------+----------------------+
| Marital Status        | Single               |
+-----------------------+----------------------+
| Gnosticism Affiliation | [id number]                 |
+-----------------------+----------------------+
| Race                  | Unknown              |
+-----------------------+----------------------+
| Ethnic Group          | Unknown              |
+-----------------------+----------------------+
 
 
 Author
 
 
+--------------+--------------------------------------------+
| Author       | [facility]  |
|              | and [place]                                |
+--------------+--------------------------------------------+
| Organization | [facility]  |
|              | and [place]                                |
+--------------+--------------------------------------------+
| Address      | Unknown                                    |
+--------------+--------------------------------------------+
| Phone        | Unavailable                                |
+--------------+--------------------------------------------+
 
 
 
 Support
 
 
+----------------+--------------+---------------------+-----------------+
| Name           | Relationship | Address             | Phone           |
+----------------+--------------+---------------------+-----------------+
| Ada/Ed Radhames | ECON         | [address]              | [phone] |
|                |              | [place]  |                 |
|                |              |  [address]              |                 |
+----------------+--------------+---------------------+-----------------+
 
 
 
 
 Care Team Providers
 
 
+-----------------------+------+-------------+
| Care Team Member Name | Role | Phone       |
+-----------------------+------+-------------+
 PCP  | Unavailable |
+-----------------------+------+-------------+
 
 
 
 Encounter Details
 
 
+--------+----------+---------------------+----------------------+-------------+
| Date   | Type     | Department          | Care Team            | Description |
+--------+----------+---------------------+----------------------+-------------+
| 08/15/ | Abstract |   [facility] [place]         |   Marzena Moser  |             |
|    |          | NEPHROLOGY  [address]   | M, DO  [address]      |             |
|        |          | [address]   | [address]      |             |
|        |          | [place], [place]     | [place]      |             |
|        |          | [address]          | [address]  [phone]  |             |
|        |          | [phone]        |  [phone] (Fax)  |             |
+--------+----------+---------------------+----------------------+-------------+
 
 
 
 Social History
 
 
+--------------+-------+-----------+--------+------+
| Tobacco Use  | Types | Packs/Day | Years  | Date |
|              |       |           | Used   |      |
+--------------+-------+-----------+--------+------+
| Never Smoker |       |           |        |      |
+--------------+-------+-----------+--------+------+
 
 
 
+---------------------+---+---+---+
| Smokeless Tobacco:  |   |   |   |
| Never Used          |   |   |   |
+---------------------+---+---+---+
 
 
 
+---------------------------------------------------------------+
| Comments: some second hand smoke exposure, but fairly minimal |
+---------------------------------------------------------------+
 
 
 
+-------------+-------------+---------+----------+
| Alcohol Use | Drinks/Week | oz/Week | Comments |
+-------------+-------------+---------+----------+
| No          |             |         |          |
+-------------+-------------+---------+----------+
 
 
 
 
+------------------+---------------+
| Sex Assigned at  | Date Recorded |
| Birth            |               |
+------------------+---------------+
| Not on file      |               |
+------------------+---------------+
 
 
 
+----------------+-------------+-------------+
| Job Start Date | Occupation  | Industry    |
+----------------+-------------+-------------+
| Not on file    | Not on file | Not on file |
+----------------+-------------+-------------+
 
 
 
+----------------+--------------+------------+
| Travel History | Travel Start | Travel End |
+----------------+--------------+------------+
 
 
 
+-------------------------------------+
| No recent travel history available. |
+-------------------------------------+
 documented as of this encounter
 
 Plan of Treatment
 
 
+--------+-----------+------------+----------------------+-------------------+
| Date   | Type      | Specialty  | Care Team            | Description       |
+--------+-----------+------------+----------------------+-------------------+
| / | Office    | Cardiology |   Tonia Wilson,    |                   |
|    | Visit     |            | ARNJESSICA  [address]   |                   |
|        |           |            | [place]  |                   |
|        |           |            | [address]  [phone]  |                   |
|        |           |            |  [phone] (Fax)  |                   |
+--------+-----------+------------+----------------------+-------------------+
| / | Hospital  | Radiology  |   Popeye Townsend, |                   |
|    | Encounter |            |  MD  [address] |                   |
|        |           |            |  St.  [place],   |                   |
|        |           |            | [place] [address]             |                   |
|        |           |            | [phone]         |                   |
|        |           |            | [phone] (Fax)   |                   |
+--------+-----------+------------+----------------------+-------------------+
| / | Surgery   | Radiology  |   Popeye Townsend, | CV EP PPM SYSTEM  |
|    |           |            |  MD  [address] | IMPLANT           |
|        |           |            |  St.  [place],   |                   |
|        |           |            | [place] [address]             |                   |
|        |           |            | [phone]         |                   |
|        |           |            | [phone] (Fax)   |                   |
+--------+-----------+------------+----------------------+-------------------+
| 02/10/ | Clinical  | Cardiology |                      |                   |
|    | Support   |            |                      |                   |
+--------+-----------+------------+----------------------+-------------------+
| / | Office    | Cardiology |   Tonia Wilson,    |                   |
|    | Visit     |            | ARNP  [address]   |                   |
 
|        |           |            | St  [place], [place]  |                   |
|        |           |            | [address]  [phone]  |                   |
|        |           |            |  [phone] (Fax)  |                   |
+--------+-----------+------------+----------------------+-------------------+
| / | Off-Site  | Nephrology |   Marzena Moser  |                   |
|    | Visit     |            | DO ETIENNE  [address] [place] [address]      |                   |
|        |           |            | [address]      |                   |
|        |           |            | [place]      |                   |
|        |           |            | [address]  [phone]  |                   |
|        |           |            |  [phone] (Fax)  |                   |
+--------+-----------+------------+----------------------+-------------------+
 documented as of this encounter
 
 Procedures
 
 
+---------------------+--------+------------+----------------------+----------------------+
| Procedure Name      | Priori | Date/Time  | Associated Diagnosis | Comments             |
|                     | ty     |            |                      |                      |
+---------------------+--------+------------+----------------------+----------------------+
| EXTERNAL LAB: TSH   | Routin | 2016 |                      |   Results for this   |
|                     | e      |            |                      | procedure are in the |
|                     |        |            |                      |  results section.    |
+---------------------+--------+------------+----------------------+----------------------+
| TACROLIMUS ()  | Routin | 2016 |                      |   Results for this   |
| TROUGH              | e      |            |                      | procedure are in the |
|                     |        |            |                      |  results section.    |
+---------------------+--------+------------+----------------------+----------------------+
 documented in this encounter
 
 Results
 Tacrolimus () Trough (2016)
 
+-------------+-------+-----------+------------+--------------+
| Component   | Value | Ref Range | Performed  | Pathologist  |
|             |       |           | At         | Signature    |
+-------------+-------+-----------+------------+--------------+
| Tacrolimus  | 5.7   |           |            |              |
| Level       |       |           |            |              |
+-------------+-------+-----------+------------+--------------+
 
 
 
+-----------------+
| Specimen        |
+-----------------+
| Blood specimen  |
| (specimen)      |
+-----------------+
 External Lab: TSH (2016)
 
+-----------+-------+------------+------------+--------------+
| Component | Value | Ref Range  | Performed  | Pathologist  |
|           |       |            | At         | Signature    |
+-----------+-------+------------+------------+--------------+
| TSH,      | 1.64  | 0.27 - 4.2 | EXTERNAL   |              |
| External  |       |            | LAB        |              |
+-----------+-------+------------+------------+--------------+
 
 
 
 
+-----------------+
| Specimen        |
+-----------------+
| Blood specimen  |
| (specimen)      |
+-----------------+
 
 
 
+--------------------------+
| Resulting Agency Comment |
+--------------------------+
|   [facility]          |
+--------------------------+
 
 
 
+----------------+---------+--------------------+--------------+
| Performing     | Address | City/State/Zipcode | Phone Number |
| Organization   |         |                    |              |
+----------------+---------+--------------------+--------------+
|   EXTERNAL LAB |         |                    |              |
+----------------+---------+--------------------+--------------+
 documented in this encounter
 
 Visit Diagnoses
 Not on filedocumented in this encounter

## 2020-01-08 NOTE — XMS
Encounter Summary
  Created on: 2020
 
 Viviana Ricci
 External Reference #: [id number]
 : 47
 Sex: Female
 
 Demographics
 
 
+-----------------------+----------------------+
| Address               | [address]      |
|                       | [place]  [address] |
+-----------------------+----------------------+
| Home Phone            | [phone]      |
+-----------------------+----------------------+
| Preferred Language    | Unknown              |
+-----------------------+----------------------+
| Marital Status        | Single               |
+-----------------------+----------------------+
| [employer] Affiliation | [id number]                 |
+-----------------------+----------------------+
| Race                  | Unknown              |
+-----------------------+----------------------+
| Ethnic Group          | Unknown              |
+-----------------------+----------------------+
 
 
 Author
 
 
+--------------+--------------------------------------------+
| Author       | [facility]  |
|              | and [facility]                                |
+--------------+--------------------------------------------+
| Organization | [facility]  |
|              | and [place]                                |
+--------------+--------------------------------------------+
| Address      | Unknown                                    |
+--------------+--------------------------------------------+
| Phone        | Unavailable                                |
+--------------+--------------------------------------------+
 
 
 
 Support
 
 
+----------------+--------------+---------------------+-----------------+
| Name           | Relationship | Address             | Phone           |
+----------------+--------------+---------------------+-----------------+
| Ada/Ed Radhames | ECON         | [address]              | [phone] |
|                |              | [place]  |                 |
|                |              |  [id number]              |                 |
+----------------+--------------+---------------------+-----------------+
 
 
 
 
 Care Team Providers
 
 
+-----------------------+------+-------------+
| Care Team Member Name | Role | Phone       |
+-----------------------+------+-------------+
 PCP  | Unavailable |
+-----------------------+------+-------------+
 
 
 
 Encounter Details
 
 
+--------+----------+---------------------+----------------------+-------------+
| Date   | Type     | Department          | Care Team            | Description |
+--------+----------+---------------------+----------------------+-------------+
| / | Abstract |   [facility] [place]         |   Marzena Moser  |             |
|    |          | NEPHROLOGY  [address]   | M, DO  [address]      |             |
|        |          | [address]   | [address]      |             |
|        |          | [place], [place]     | [place]      |             |
|        |          | [address]          | [address]  [phone]  |             |
|        |          | [phone]        |  [phone] (Fax)  |             |
+--------+----------+---------------------+----------------------+-------------+
 
 
 
 Social History
 
 
+--------------+-------+-----------+--------+------+
| Tobacco Use  | Types | Packs/Day | Years  | Date |
|              |       |           | Used   |      |
+--------------+-------+-----------+--------+------+
| Never Smoker |       |           |        |      |
+--------------+-------+-----------+--------+------+
 
 
 
+---------------------+---+---+---+
| Smokeless Tobacco:  |   |   |   |
| Never Used          |   |   |   |
+---------------------+---+---+---+
 
 
 
+---------------------------------------------------------------+
| Comments: some second hand smoke exposure, but fairly minimal |
+---------------------------------------------------------------+
 
 
 
+-------------+-------------+---------+----------+
| Alcohol Use | Drinks/Week | oz/Week | Comments |
+-------------+-------------+---------+----------+
| No          |             |         |          |
+-------------+-------------+---------+----------+
 
 
 
 
+------------------+---------------+
| Sex Assigned at  | Date Recorded |
| Birth            |               |
+------------------+---------------+
| Not on file      |               |
+------------------+---------------+
 
 
 
+----------------+-------------+-------------+
| Job Start Date | Occupation  | Industry    |
+----------------+-------------+-------------+
| Not on file    | Not on file | Not on file |
+----------------+-------------+-------------+
 
 
 
+----------------+--------------+------------+
| Travel History | Travel Start | Travel End |
+----------------+--------------+------------+
 
 
 
+-------------------------------------+
| No recent travel history available. |
+-------------------------------------+
 documented as of this encounter
 
 Plan of Treatment
 
 
+--------+-----------+------------+----------------------+-------------------+
| Date   | Type      | Specialty  | Care Team            | Description       |
+--------+-----------+------------+----------------------+-------------------+
| / | Office    | Cardiology |   Tonia Wilson,    |                   |
|    | Visit     |            | ARNJESSICA  [address]   |                   |
|        |           |            | [place]  |                   |
|        |           |            | [address]  [phone]  |                   |
|        |           |            |  [phone] (Fax)  |                   |
+--------+-----------+------------+----------------------+-------------------+
| / | Hospital  | Radiology  |   Popeye Townsend, |                   |
|    | Encounter |            |  MD  [address] |                   |
|        |           |            |  St.  [place],   |                   |
|        |           |            | [place] [address]             |                   |
|        |           |            | [phone]         |                   |
|        |           |            | [phone] (Fax)   |                   |
+--------+-----------+------------+----------------------+-------------------+
| / | Surgery   | Radiology  |   Popeye Townsend, | CV EP PPM SYSTEM  |
|    |           |            |  MD  [address] | IMPLANT           |
|        |           |            |  St.  [place],   |                   |
|        |           |            | [place] [address]             |                   |
|        |           |            | [phone]         |                   |
|        |           |            | [phone] (Fax)   |                   |
+--------+-----------+------------+----------------------+-------------------+
| 02/10/ | Clinical  | Cardiology |                      |                   |
|    | Support   |            |                      |                   |
+--------+-----------+------------+----------------------+-------------------+
| / | Office    | Cardiology |   Tonia Wilson,    |                   |
|    | Visit     |            | ARNP  [address]   |                   |
 
|        |           |            | St  [place], [place]  |                   |
|        |           |            | [address]  [phone]  |                   |
|        |           |            |  [phone] (Fax)  |                   |
+--------+-----------+------------+----------------------+-------------------+
| / | Off-Site  | Nephrology |   Marzena Moser  |                   |
| 2020   | Visit     |            | DO ETIENNE  [address] [place] [address]      |                   |
|        |           |            | [address]      |                   |
|        |           |            | [place]      |                   |
|        |           |            | [address]  [phone]  |                   |
|        |           |            |  [phone] (Fax)  |                   |
+--------+-----------+------------+----------------------+-------------------+
 documented as of this encounter
 
 Procedures
 
 
+----------------------+--------+------------+----------------------+----------------------+
| Procedure Name       | Priori | Date/Time  | Associated Diagnosis | Comments             |
|                      | ty     |            |                      |                      |
+----------------------+--------+------------+----------------------+----------------------+
| EXTERNAL LAB:        | Routin | 2015 |                      |   Results for this   |
| GLUCOSE              | e      |            |                      | procedure are in the |
|                      |        |            |                      |  results section.    |
+----------------------+--------+------------+----------------------+----------------------+
| EXTERNAL LAB:        | Routin | 2015 |                      |   Results for this   |
| TACROLIMUS LEVEL,    | e      |            |                      | procedure are in the |
| LC-MS/MS             |        |            |                      |  results section.    |
+----------------------+--------+------------+----------------------+----------------------+
| EXTERNAL LAB: ALT    | Routin | 2015 |                      |   Results for this   |
|                      | e      |            |                      | procedure are in the |
|                      |        |            |                      |  results section.    |
+----------------------+--------+------------+----------------------+----------------------+
| EXTERNAL LAB: AST    | Routin | 2015 |                      |   Results for this   |
|                      | e      |            |                      | procedure are in the |
|                      |        |            |                      |  results section.    |
+----------------------+--------+------------+----------------------+----------------------+
| EXTERNAL LAB:        | Routin | 2015 |                      |   Results for this   |
| ALKALINE PHOSPHATASE | e      |            |                      | procedure are in the |
|                      |        |            |                      |  results section.    |
+----------------------+--------+------------+----------------------+----------------------+
| EXTERNAL LAB:        | Routin | 2015 |                      |   Results for this   |
| BILIRUBIN, TOTAL     | e      |            |                      | procedure are in the |
|                      |        |            |                      |  results section.    |
+----------------------+--------+------------+----------------------+----------------------+
| EXTERNAL LAB:        | Routin | 2015 |                      |   Results for this   |
| ALBUMIN              | e      |            |                      | procedure are in the |
|                      |        |            |                      |  results section.    |
+----------------------+--------+------------+----------------------+----------------------+
| EXTERNAL LAB:        | Routin | 2015 |                      |   Results for this   |
| PROTEIN, TOTAL       | e      |            |                      | procedure are in the |
|                      |        |            |                      |  results section.    |
+----------------------+--------+------------+----------------------+----------------------+
| EXTERNAL LAB:        | Routin | 2015 |                      |   Results for this   |
| PHOSPHORUS           | e      |            |                      | procedure are in the |
|                      |        |            |                      |  results section.    |
+----------------------+--------+------------+----------------------+----------------------+
| EXTERNAL LAB:        | Routin | 2015 |                      |   Results for this   |
| MAGNESIUM            | e      |            |                      | procedure are in the |
|                      |        |            |                      |  results section.    |
+----------------------+--------+------------+----------------------+----------------------+
 
| EXTERNAL LAB:        | Routin | 2015 |                      |   Results for this   |
| CALCIUM              | e      |            |                      | procedure are in the |
|                      |        |            |                      |  results section.    |
+----------------------+--------+------------+----------------------+----------------------+
| EXTERNAL LAB: CARBON | Routin | 2015 |                      |   Results for this   |
|  DIOXIDE             | e      |            |                      | procedure are in the |
|                      |        |            |                      |  results section.    |
+----------------------+--------+------------+----------------------+----------------------+
| EXTERNAL LAB:        | Routin | 2015 |                      |   Results for this   |
| CHLORIDE             | e      |            |                      | procedure are in the |
|                      |        |            |                      |  results section.    |
+----------------------+--------+------------+----------------------+----------------------+
| EXTERNAL LAB:        | Routin | 2015 |                      |   Results for this   |
| POTASSIUM            | e      |            |                      | procedure are in the |
|                      |        |            |                      |  results section.    |
+----------------------+--------+------------+----------------------+----------------------+
| EXTERNAL LAB: SODIUM | Routin | 2015 |                      |   Results for this   |
|                      | e      |            |                      | procedure are in the |
|                      |        |            |                      |  results section.    |
+----------------------+--------+------------+----------------------+----------------------+
| EXTERNAL LAB: EVY   | Routin | 2015 |                      |   Results for this   |
| INTACT               | e      |            |                      | procedure are in the |
|                      |        |            |                      |  results section.    |
+----------------------+--------+------------+----------------------+----------------------+
| EXTERNAL LAB: CBC    | Routin | 2015 |                      |   Results for this   |
|                      | e      |            |                      | procedure are in the |
|                      |        |            |                      |  results section.    |
+----------------------+--------+------------+----------------------+----------------------+
| EXTERNAL LAB:        | Routin | 2015 |                      |   Results for this   |
| TRIGLYCERIDES        | e      |            |                      | procedure are in the |
|                      |        |            |                      |  results section.    |
+----------------------+--------+------------+----------------------+----------------------+
| EXTERNAL LAB:        | Routin | 2015 |                      |   Results for this   |
| CHOLESTEROL, HDL     | e      |            |                      | procedure are in the |
|                      |        |            |                      |  results section.    |
+----------------------+--------+------------+----------------------+----------------------+
| EXTERNAL LAB:        | Routin | 2015 |                      |   Results for this   |
| CHOLESTEROL, TOTAL   | e      |            |                      | procedure are in the |
|                      |        |            |                      |  results section.    |
+----------------------+--------+------------+----------------------+----------------------+
| EXTERNAL LAB:        | Routin | 2015 |                      |   Results for this   |
| CHOLESTEROL, LDL     | e      |            |                      | procedure are in the |
|                      |        |            |                      |  results section.    |
+----------------------+--------+------------+----------------------+----------------------+
| EXTERNAL LAB: EGFR   | Routin | 2015 |                      |   Results for this   |
|                      | e      |            |                      | procedure are in the |
|                      |        |            |                      |  results section.    |
+----------------------+--------+------------+----------------------+----------------------+
| EXTERNAL LAB:        | Routin | 2015 |                      |   Results for this   |
| CREATININE           | e      |            |                      | procedure are in the |
|                      |        |            |                      |  results section.    |
+----------------------+--------+------------+----------------------+----------------------+
| HEMOGLOBIN A1C       | Routin | 2015 |                      |   Results for this   |
|                      | e      |            |                      | procedure are in the |
|                      |        |            |                      |  results section.    |
+----------------------+--------+------------+----------------------+----------------------+
 documented in this encounter
 
 Results
 External Lab: PTH, Intact (2015)
 
 
+-------------+-------+-----------+------------+--------------+
| Component   | Value | Ref Range | Performed  | Pathologist  |
|             |       |           | At         | Signature    |
+-------------+-------+-----------+------------+--------------+
| PTH Intact, | 187.8 |           |            |              |
|  External   |       |           |            |              |
+-------------+-------+-----------+------------+--------------+
 
 
 
+----------+
| Specimen |
+----------+
|          |
+----------+
 External Lab: Tacrolimus Level, LC-MS/MS (2015)
 
+-------------+-------+-----------+------------+--------------+
| Component   | Value | Ref Range | Performed  | Pathologist  |
|             |       |           | At         | Signature    |
+-------------+-------+-----------+------------+--------------+
| Tacrolimus, | 5.5   |           |            |              |
|  LC-MS/MS,  |       |           |            |              |
| External    |       |           |            |              |
+-------------+-------+-----------+------------+--------------+
 
 
 
+----------+
| Specimen |
+----------+
|          |
+----------+
 Hemoglobin A1C (2015)
 
+-------------+-------+-----------+------------+--------------+
| Component   | Value | Ref Range | Performed  | Pathologist  |
|             |       |           | At         | Signature    |
+-------------+-------+-----------+------------+--------------+
| Hemoglobin  | 7.6   | %         | EXTERNAL   |              |
| A1c         |       |           | LAB        |              |
+-------------+-------+-----------+------------+--------------+
 
 
 
+-----------------+
| Specimen        |
+-----------------+
| Blood specimen  |
| (specimen)      |
+-----------------+
 
 
 
+----------------+---------+--------------------+--------------+
| Performing     | Address | City/State/Zipcode | Phone Number |
| Organization   |         |                    |              |
+----------------+---------+--------------------+--------------+
|   EXTERNAL LAB |         |                    |              |
 
+----------------+---------+--------------------+--------------+
 External Lab: Glucose (2015)
 
+-----------+-------+-----------+------------+--------------+
| Component | Value | Ref Range | Performed  | Pathologist  |
|           |       |           | At         | Signature    |
+-----------+-------+-----------+------------+--------------+
| Glucose,  | 150   |           | EXTERNAL   |              |
| External  |       |           | LAB        |              |
+-----------+-------+-----------+------------+--------------+
 
 
 
+----------------+---------+--------------------+--------------+
| Performing     | Address | City/State/Zipcode | Phone Number |
| Organization   |         |                    |              |
+----------------+---------+--------------------+--------------+
|   EXTERNAL LAB |         |                    |              |
+----------------+---------+--------------------+--------------+
 External Lab: ALT (2015)
 
+-----------+-------+-----------+------------+--------------+
| Component | Value | Ref Range | Performed  | Pathologist  |
|           |       |           | At         | Signature    |
+-----------+-------+-----------+------------+--------------+
| ALT,      | 16    |           | EXTERNAL   |              |
| External  |       |           | LAB        |              |
+-----------+-------+-----------+------------+--------------+
 
 
 
+----------------+---------+--------------------+--------------+
| Performing     | Address | City/State/Zipcode | Phone Number |
| Organization   |         |                    |              |
+----------------+---------+--------------------+--------------+
|   EXTERNAL LAB |         |                    |              |
+----------------+---------+--------------------+--------------+
 External Lab: AST (2015)
 
+-----------+-------+-----------+------------+--------------+
| Component | Value | Ref Range | Performed  | Pathologist  |
|           |       |           | At         | Signature    |
+-----------+-------+-----------+------------+--------------+
| AST,      | 20    |           | EXTERNAL   |              |
| External  |       |           | LAB        |              |
+-----------+-------+-----------+------------+--------------+
 
 
 
+----------------+---------+--------------------+--------------+
| Performing     | Address | City/State/Zipcode | Phone Number |
| Organization   |         |                    |              |
+----------------+---------+--------------------+--------------+
|   EXTERNAL LAB |         |                    |              |
+----------------+---------+--------------------+--------------+
 External Lab: Alkaline Phosphatase (2015)
 
+-----------+-------+-----------+------------+--------------+
| Component | Value | Ref Range | Performed  | Pathologist  |
|           |       |           | At         | Signature    |
 
+-----------+-------+-----------+------------+--------------+
| ALP,      | 91    |           | EXTERNAL   |              |
| External  |       |           | LAB        |              |
+-----------+-------+-----------+------------+--------------+
 
 
 
+----------------+---------+--------------------+--------------+
| Performing     | Address | City/State/Zipcode | Phone Number |
| Organization   |         |                    |              |
+----------------+---------+--------------------+--------------+
|   EXTERNAL LAB |         |                    |              |
+----------------+---------+--------------------+--------------+
 External Lab: Bilirubin, Total (2015)
 
+-------------+-------+-----------+------------+--------------+
| Component   | Value | Ref Range | Performed  | Pathologist  |
|             |       |           | At         | Signature    |
+-------------+-------+-----------+------------+--------------+
| Bilirubin,  | 0.5   |           | EXTERNAL   |              |
| Total,      |       |           | LAB        |              |
| External    |       |           |            |              |
+-------------+-------+-----------+------------+--------------+
 
 
 
+----------------+---------+--------------------+--------------+
| Performing     | Address | City/State/Zipcode | Phone Number |
| Organization   |         |                    |              |
+----------------+---------+--------------------+--------------+
|   EXTERNAL LAB |         |                    |              |
+----------------+---------+--------------------+--------------+
 External Lab: Albumin (2015)
 
+-----------+-------+-----------+------------+--------------+
| Component | Value | Ref Range | Performed  | Pathologist  |
|           |       |           | At         | Signature    |
+-----------+-------+-----------+------------+--------------+
| Albumin,  | 3.9   |           | EXTERNAL   |              |
| External  |       |           | LAB        |              |
+-----------+-------+-----------+------------+--------------+
 
 
 
+----------------+---------+--------------------+--------------+
| Performing     | Address | City/State/Zipcode | Phone Number |
| Organization   |         |                    |              |
+----------------+---------+--------------------+--------------+
|   EXTERNAL LAB |         |                    |              |
+----------------+---------+--------------------+--------------+
 External Lab: Protein, Total (2015)
 
+-----------+-------+-----------+------------+--------------+
| Component | Value | Ref Range | Performed  | Pathologist  |
|           |       |           | At         | Signature    |
+-----------+-------+-----------+------------+--------------+
| Protein,  | 6.1   |           | EXTERNAL   |              |
| Total,    |       |           | LAB        |              |
| External  |       |           |            |              |
+-----------+-------+-----------+------------+--------------+
 
 
 
 
+----------------+---------+--------------------+--------------+
| Performing     | Address | City/State/Zipcode | Phone Number |
| Organization   |         |                    |              |
+----------------+---------+--------------------+--------------+
|   EXTERNAL LAB |         |                    |              |
+----------------+---------+--------------------+--------------+
 External Lab: Phosphorus (2015)
 
+-------------+-------+-----------+------------+--------------+
| Component   | Value | Ref Range | Performed  | Pathologist  |
|             |       |           | At         | Signature    |
+-------------+-------+-----------+------------+--------------+
| Phosphorus, | 2.5   |           | EXTERNAL   |              |
|  External   |       |           | LAB        |              |
+-------------+-------+-----------+------------+--------------+
 
 
 
+----------------+---------+--------------------+--------------+
| Performing     | Address | City/State/Zipcode | Phone Number |
| Organization   |         |                    |              |
+----------------+---------+--------------------+--------------+
|   EXTERNAL LAB |         |                    |              |
+----------------+---------+--------------------+--------------+
 External Lab: Magnesium (2015)
 
+-------------+-------+-----------+------------+--------------+
| Component   | Value | Ref Range | Performed  | Pathologist  |
|             |       |           | At         | Signature    |
+-------------+-------+-----------+------------+--------------+
| Magnesium,  | 1.6   |           | EXTERNAL   |              |
| External    |       |           | LAB        |              |
+-------------+-------+-----------+------------+--------------+
 
 
 
+----------------+---------+--------------------+--------------+
| Performing     | Address | City/State/Zipcode | Phone Number |
| Organization   |         |                    |              |
+----------------+---------+--------------------+--------------+
|   EXTERNAL LAB |         |                    |              |
+----------------+---------+--------------------+--------------+
 External Lab: Calcium (2015)
 
+-----------+-------+-----------+------------+--------------+
| Component | Value | Ref Range | Performed  | Pathologist  |
|           |       |           | At         | Signature    |
+-----------+-------+-----------+------------+--------------+
| Calcium,  | 10.1  |           | EXTERNAL   |              |
| External  |       |           | LAB        |              |
+-----------+-------+-----------+------------+--------------+
 
 
 
+----------------+---------+--------------------+--------------+
| Performing     | Address | City/State/Zipcode | Phone Number |
| Organization   |         |                    |              |
 
+----------------+---------+--------------------+--------------+
|   EXTERNAL LAB |         |                    |              |
+----------------+---------+--------------------+--------------+
 External Lab: Carbon Dioxide (2015)
 
+-----------+-------+-----------+------------+--------------+
| Component | Value | Ref Range | Performed  | Pathologist  |
|           |       |           | At         | Signature    |
+-----------+-------+-----------+------------+--------------+
| Carbon    | 17    |           | EXTERNAL   |              |
| Dioxide,  |       |           | LAB        |              |
| External  |       |           |            |              |
+-----------+-------+-----------+------------+--------------+
 
 
 
+----------------+---------+--------------------+--------------+
| Performing     | Address | City/State/Zipcode | Phone Number |
| Organization   |         |                    |              |
+----------------+---------+--------------------+--------------+
|   EXTERNAL LAB |         |                    |              |
+----------------+---------+--------------------+--------------+
 External Lab: Chloride (2015)
 
+------------+-------+-----------+------------+--------------+
| Component  | Value | Ref Range | Performed  | Pathologist  |
|            |       |           | At         | Signature    |
+------------+-------+-----------+------------+--------------+
| Chloride,  | 112   |           | EXTERNAL   |              |
| External   |       |           | LAB        |              |
+------------+-------+-----------+------------+--------------+
 
 
 
+----------------+---------+--------------------+--------------+
| Performing     | Address | City/State/Zipcode | Phone Number |
| Organization   |         |                    |              |
+----------------+---------+--------------------+--------------+
|   EXTERNAL LAB |         |                    |              |
+----------------+---------+--------------------+--------------+
 External Lab: Potassium (2015)
 
+-------------+-------+-----------+------------+--------------+
| Component   | Value | Ref Range | Performed  | Pathologist  |
|             |       |           | At         | Signature    |
+-------------+-------+-----------+------------+--------------+
| Potassium,  | 5.1   |           | EXTERNAL   |              |
| External    |       |           | LAB        |              |
+-------------+-------+-----------+------------+--------------+
 
 
 
+----------------+---------+--------------------+--------------+
| Performing     | Address | City/State/Zipcode | Phone Number |
| Organization   |         |                    |              |
+----------------+---------+--------------------+--------------+
|   EXTERNAL LAB |         |                    |              |
+----------------+---------+--------------------+--------------+
 External Lab: Sodium (2015)
 
 
+-----------+-------+-----------+------------+--------------+
| Component | Value | Ref Range | Performed  | Pathologist  |
|           |       |           | At         | Signature    |
+-----------+-------+-----------+------------+--------------+
| Sodium,   | 139   |           | EXTERNAL   |              |
| External  |       |           | LAB        |              |
+-----------+-------+-----------+------------+--------------+
 
 
 
+----------------+---------+--------------------+--------------+
| Performing     | Address | City/State/Zipcode | Phone Number |
| Organization   |         |                    |              |
+----------------+---------+--------------------+--------------+
|   EXTERNAL LAB |         |                    |              |
+----------------+---------+--------------------+--------------+
 External Lab: CBC (2015)
 
+-----------+-------+-----------+------------+--------------+
| Component | Value | Ref Range | Performed  | Pathologist  |
|           |       |           | At         | Signature    |
+-----------+-------+-----------+------------+--------------+
| WBC,      | 5.5   |           | EXTERNAL   |              |
| External  |       |           | LAB        |              |
+-----------+-------+-----------+------------+--------------+
| HGB,      | 13.3  |           | EXTERNAL   |              |
| External  |       |           | LAB        |              |
+-----------+-------+-----------+------------+--------------+
| HCT,      | 41.1  |           | EXTERNAL   |              |
| External  |       |           | LAB        |              |
+-----------+-------+-----------+------------+--------------+
| PLT,      | 158   |           | EXTERNAL   |              |
| External  |       |           | LAB        |              |
+-----------+-------+-----------+------------+--------------+
| RBC,      | 3.95  |           | EXTERNAL   |              |
| External  |       |           | LAB        |              |
+-----------+-------+-----------+------------+--------------+
| MCV,      | 104   |           | EXTERNAL   |              |
| External  |       |           | LAB        |              |
+-----------+-------+-----------+------------+--------------+
| RDW,      | 13.8  |           | EXTERNAL   |              |
| External  |       |           | LAB        |              |
+-----------+-------+-----------+------------+--------------+
 
 
 
+----------------+---------+--------------------+--------------+
| Performing     | Address | City/State/Zipcode | Phone Number |
| Organization   |         |                    |              |
+----------------+---------+--------------------+--------------+
|   EXTERNAL LAB |         |                    |              |
+----------------+---------+--------------------+--------------+
 External Lab: Triglycerides (2015)
 
+-------------+-------+-----------+------------+--------------+
| Component   | Value | Ref Range | Performed  | Pathologist  |
|             |       |           | At         | Signature    |
+-------------+-------+-----------+------------+--------------+
| Triglycerid | 232   |           | EXTERNAL   |              |
| es,         |       |           | LAB        |              |
 
| External    |       |           |            |              |
+-------------+-------+-----------+------------+--------------+
 
 
 
+-----------------+
| Specimen        |
+-----------------+
| Blood specimen  |
| (specimen)      |
+-----------------+
 
 
 
+----------------+---------+--------------------+--------------+
| Performing     | Address | City/State/Zipcode | Phone Number |
| Organization   |         |                    |              |
+----------------+---------+--------------------+--------------+
|   EXTERNAL LAB |         |                    |              |
+----------------+---------+--------------------+--------------+
 External Lab: Cholesterol, HDL (2015)
 
+-------------+-------+-----------+------------+--------------+
| Component   | Value | Ref Range | Performed  | Pathologist  |
|             |       |           | At         | Signature    |
+-------------+-------+-----------+------------+--------------+
| HDL         | 56.2  |           | EXTERNAL   |              |
| Cholesterol |       |           | LAB        |              |
| , External  |       |           |            |              |
+-------------+-------+-----------+------------+--------------+
 
 
 
+-----------------+
| Specimen        |
+-----------------+
| Blood specimen  |
| (specimen)      |
+-----------------+
 
 
 
+----------------+---------+--------------------+--------------+
| Performing     | Address | City/State/Zipcode | Phone Number |
| Organization   |         |                    |              |
+----------------+---------+--------------------+--------------+
|   EXTERNAL LAB |         |                    |              |
+----------------+---------+--------------------+--------------+
 External Lab: Cholesterol, Total (2015)
 
+-------------+-------+-----------+------------+--------------+
| Component   | Value | Ref Range | Performed  | Pathologist  |
|             |       |           | At         | Signature    |
+-------------+-------+-----------+------------+--------------+
| Cholesterol | 161   |           | EXTERNAL   |              |
| , Total,    |       |           | LAB        |              |
| External    |       |           |            |              |
+-------------+-------+-----------+------------+--------------+
 
 
 
 
+-----------------+
| Specimen        |
+-----------------+
| Blood specimen  |
| (specimen)      |
+-----------------+
 
 
 
+----------------+---------+--------------------+--------------+
| Performing     | Address | City/State/Zipcode | Phone Number |
| Organization   |         |                    |              |
+----------------+---------+--------------------+--------------+
|   EXTERNAL LAB |         |                    |              |
+----------------+---------+--------------------+--------------+
 External Lab: Cholesterol, LDL (2015)
 
+-------------+-------+-----------+------------+--------------+
| Component   | Value | Ref Range | Performed  | Pathologist  |
|             |       |           | At         | Signature    |
+-------------+-------+-----------+------------+--------------+
| LDL         | 58    |           | EXTERNAL   |              |
| Cholesterol |       |           | LAB        |              |
| , Direct,   |       |           |            |              |
| External    |       |           |            |              |
+-------------+-------+-----------+------------+--------------+
 
 
 
+-----------------+
| Specimen        |
+-----------------+
| Blood specimen  |
| (specimen)      |
+-----------------+
 
 
 
+----------------+---------+--------------------+--------------+
| Performing     | Address | City/State/Zipcode | Phone Number |
| Organization   |         |                    |              |
+----------------+---------+--------------------+--------------+
|   EXTERNAL LAB |         |                    |              |
+----------------+---------+--------------------+--------------+
 External Lab: eGFR (2015)
 
+------------+-------+-----------+------------+--------------+
| Component  | Value | Ref Range | Performed  | Pathologist  |
|            |       |           | At         | Signature    |
+------------+-------+-----------+------------+--------------+
| eGFR,      | 45    |           | EXTERNAL   |              |
| External   |       |           | LAB        |              |
+------------+-------+-----------+------------+--------------+
| eGFR,      |       |           | EXTERNAL   |              |
|     |       |           | LAB        |              |
| [employer],  |       |           |            |              |
| External   |       |           |            |              |
+------------+-------+-----------+------------+--------------+
 
 
 
 
+-----------------+
| Specimen        |
+-----------------+
| Blood specimen  |
| (specimen)      |
+-----------------+
 
 
 
+----------------+---------+--------------------+--------------+
| Performing     | Address | City/State/Zipcode | Phone Number |
| Organization   |         |                    |              |
+----------------+---------+--------------------+--------------+
|   EXTERNAL LAB |         |                    |              |
+----------------+---------+--------------------+--------------+
 External Lab: Creatinine (2015)
 
+-------------+-------+-----------+------------+--------------+
| Component   | Value | Ref Range | Performed  | Pathologist  |
|             |       |           | At         | Signature    |
+-------------+-------+-----------+------------+--------------+
| Creatinine, | 1.2   |           | EXTERNAL   |              |
|  External   |       |           | LAB        |              |
+-------------+-------+-----------+------------+--------------+
 
 
 
+-----------------+
| Specimen        |
+-----------------+
| Blood specimen  |
| (specimen)      |
+-----------------+
 
 
 
+----------------+---------+--------------------+--------------+
| Performing     | Address | City/State/Zipcode | Phone Number |
| Organization   |         |                    |              |
+----------------+---------+--------------------+--------------+
|   EXTERNAL LAB |         |                    |              |
+----------------+---------+--------------------+--------------+
 documented in this encounter
 
 Visit Diagnoses
 Not on filedocumented in this encounter

## 2020-01-08 NOTE — XMS
Encounter Summary
  Created on: 2020
 
 Ras Ricci
 External Reference #: [id number]
 : 47
 Sex: Female
 
 Demographics
 
 
+-----------------------+----------------------+
| Address               | [address]      |
|                       | [place]  [address] |
+-----------------------+----------------------+
| Home Phone            | [phone]      |
+-----------------------+----------------------+
| Preferred Language    | Unknown              |
+-----------------------+----------------------+
| Marital Status        | Single               |
+-----------------------+----------------------+
| Taoist Affiliation | [id number]                 |
+-----------------------+----------------------+
| Race                  | Unknown              |
+-----------------------+----------------------+
| Ethnic Group          | Unknown              |
+-----------------------+----------------------+
 
 
 Author
 
 
+--------------+--------------------------------------------+
| Author       | [facility]  |
|              | [facility]                                |
+--------------+--------------------------------------------+
| Organization | [facility] [place]  |
|              | and [facility]                                |
+--------------+--------------------------------------------+
| Address      | Unknown                                    |
+--------------+--------------------------------------------+
| Phone        | Unavailable                                |
+--------------+--------------------------------------------+
 
 
 
 Support
 
 
+----------------+--------------+---------------------+-----------------+
| Name           | Relationship | Address             | Phone           |
+----------------+--------------+---------------------+-----------------+
| Ada/Ed Radhames | ECON         | [address]              | [phone] |
|                |              | [place]  |                 |
|                |              |  [address]              |                 |
+----------------+--------------+---------------------+-----------------+
 
 
 
 
 Care Team Providers
 
 
+-----------------------+------+-------------+
| Care Team Member Name | Role | Phone       |
+-----------------------+------+-------------+
 PCP  | Unavailable |
+-----------------------+------+-------------+
 
 
 
 Encounter Details
 
 
+--------+-----------+----------------------+----------------------+-------------+
| Date   | Type      | Department           | Care Team            | Description |
+--------+-----------+----------------------+----------------------+-------------+
| / | Hospital  |   [facility]     |   [place],        |             |
|    | Encounter | CONVERSION DEP  [address]     |             |
|        |           | [address]           | [address]            |             |
|        |           | [place]         | [place] [address]  |             |
|        |           | [address]           |  [phone]        |             |
|        |           | [phone]         | [phone] (Fax)   |             |
+--------+-----------+----------------------+----------------------+-------------+
 
 
 
 Social History
 
 
+----------------+-------+-----------+--------+------+
| Tobacco Use    | Types | Packs/Day | Years  | Date |
|                |       |           | Used   |      |
+----------------+-------+-----------+--------+------+
| Never Assessed |       |           |        |      |
+----------------+-------+-----------+--------+------+
 
 
 
+------------------+---------------+
| Sex Assigned at  | Date Recorded |
| Birth            |               |
+------------------+---------------+
| Not on file      |               |
+------------------+---------------+
 
 
 
+----------------+-------------+-------------+
| Job Start Date | Occupation  | Industry    |
+----------------+-------------+-------------+
| Not on file    | Not on file | Not on file |
+----------------+-------------+-------------+
 
 
 
+----------------+--------------+------------+
| Travel History | Travel Start | Travel End |
+----------------+--------------+------------+
 
 
 
 
+-------------------------------------+
| No recent travel history available. |
+-------------------------------------+
 documented as of this encounter
 
 Plan of Treatment
 
 
+--------+-----------+------------+----------------------+-------------------+
| Date   | Type      | Specialty  | Care Team            | Description       |
+--------+-----------+------------+----------------------+-------------------+
| / | Office    | Cardiology |   Tonia Wilson,    |                   |
|    | Visit     |            | ARNP  [address]   |                   |
|        |           |            | [place], [place]  |                   |
|        |           |            | [address]  [phone]  |                   |
|        |           |            |  [phone] (Fax)  |                   |
+--------+-----------+------------+----------------------+-------------------+
| / | Hospital  | Radiology  |   Popeye Townsend, |                   |
|    | Encounter |            |  MD  [address] |                   |
|        |           |            |  [place],   |                   |
|        |           |            | [place] [address]             |                   |
|        |           |            | [phone]         |                   |
|        |           |            | [phone] (Fax)   |                   |
+--------+-----------+------------+----------------------+-------------------+
| / | Surgery   | Radiology  |   Popeye Townsend, | CV EP PPM SYSTEM  |
|    |           |            |  MD  [address] | IMPLANT           |
|        |           |            |  [place],   |                   |
|        |           |            | [place] [address]             |                   |
|        |           |            | [phone]         |                   |
|        |           |            | [phone] (Fax)   |                   |
+--------+-----------+------------+----------------------+-------------------+
| 02/10/ | Clinical  | Cardiology |                      |                   |
|    | Support   |            |                      |                   |
+--------+-----------+------------+----------------------+-------------------+
| / | Office    | Cardiology |   Tonia Wilson,    |                   |
|    | Visit     |            | [facility]  [address]   |                   |
|        |           |            |   [place]  |                   |
|        |           |            | [address]  [phone]  |                   |
|        |           |            |  [phone] (Fax)  |                   |
+--------+-----------+------------+----------------------+-------------------+
| / | Off-Site  | Nephrology |   Natalee Williamson  |                   |
|    | Visit     |            | DO ETIENNE  [address] [place] [address]      |                   |
|        |           |            | [address]      |                   |
|        |           |            | [place]      |                   |
|        |           |            | [address]  [phone]  |                   |
|        |           |            |  [phone] (Fax)  |                   |
+--------+-----------+------------+----------------------+-------------------+
 documented as of this encounter
 
 Procedures
 
 
+---------------------+--------+-------------+----------------------+----------------------+
| Procedure Name      | Priori | Date/Time   | Associated Diagnosis | Comments             |
|                     | ty     |             |                      |                      |
+---------------------+--------+-------------+----------------------+----------------------+
| MRI BRAIN WO        | Routin | 2009  |                      |   Results for this   |
 
| CONTRAST ANGIOGRAM  | e      |  1:23 PM    |                      | procedure are in the |
| HEAD WO CONTRAST    |        | PST         |                      |  results section.    |
+---------------------+--------+-------------+----------------------+----------------------+
 documented in this encounter
 
 Results
 MRI Brain Wo MRA Head Wo (2009  1:23 PM PST)
 
+----------+
| Specimen |
+----------+
|          |
+----------+
 
 
 
+------------------------------------------------------------------------+--------------+
| Narrative                                                              | Performed At |
+------------------------------------------------------------------------+--------------+
|      [id number]                                                           |              |
|            Page   1  RADIOLOGY                                         |              |
|                           /                                            |              |
|                                        O/P  [facility]      |              |
|               [place] [address]                |              |
|        MEDICAL RECORD #:   [id number]  [phone]                 |              |
|          ACCOUNT #:   [id number]  [phone]                    |              |
|      YOB: 1945     ORDER NUMBER:   [id number]  EXAM     |              |
| DATE/TIME:   2009 12:15 P  ORDERING PHYSICIAN:   [place],     |              |
| REBEL GARCIA  ORDER DETAIL:   830 /   / HMR  EXAM DESCRIPTION: MRI BRAIN |              |
|  AND MRA HEAD UN                                                       |              |
| ____________________________________________________________________   |              |
| MRI AND MR ANGIOGRAPHY OF THE BRAIN NONCONTRAST 2009     HISTORY |              |
|   A 64-year-old female with involuntary motion of the neck.     The    |              |
| patient also has a history of hypertension and diabetes. The patient   |              |
| has had a prior kidney transplant.     COMPARISON  No prior exam.      |              |
| TECHNIQUE  Using a 1.5 Marichuy magnet, diffusion, axial FLAIR, axial T2, |              |
|  axial T1,  axial GRE, and sagittal FLAIR imaging was performed.       |              |
| Time-of-flight MR  angiography was then performed with MIP reformation |              |
|  imaging.     FINDINGS  Diffusion imaging is normal, thereby excluding |              |
|  acute or subacute  cerebral infarction. Gray and white                |              |
| cerebral/cerebellar/brainstem matter  show normal intensity and        |              |
| demarcation. No demyelinating disease or  lacunar infarctions. No      |              |
| ischemic gliosis. No cerebral edema, mass,  intracranial hemorrhage,   |              |
| or extraaxial fluid collection can be seen. The  sagittal images show  |              |
| a normal pituitary gland, aqueduct of Sylvius, and  foramen magnum.    |              |
| The corpus callosum is normal. Gradient echo axial  images fail to     |              |
| show any hemosiderin in the central white matter or basal  ganglia.    |              |
|   Internal auditory canals show normal caliber and CSF content. The    |              |
| cerebellopontine angles are clear. The nasopharyngeal airway is open.  |              |
|  The orbits are unremarkable.     The paranasal sinuses and mastoid    |              |
| air cells are clear.     MR ANGIOGRAPHY  The left posterior            |              |
| communicating artery is hypoplastic and the right  posterior           |              |
| communicating artery is aplastic.     There are mild irregularities of |              |
|  the M1 segments of both the right and  left middle cerebral arteries, |              |
|  due to "standing waves", an artifact.     The posterior right and     |              |
| left cerebral arteries show rapid tapering as  they course around the  |              |
| brainstem. This is probably artifactual due to  slow flow. The distal  |              |
| vertebral arteries are asymmetrical in size, the  left greater than    |              |
| the right. The basilar artery is widely patent. The  distal internal   |              |
| carotid arteries are widely patent.     No obvious aneurysm or         |              |
 
| stenosis visualized. No obvious arteriovenous  malformation.           |              |
| CONCLUSIONS  1. Normal MRI of the brain.  2. Anatomical variation of   |              |
| the [place]. Otherwise unremarkable      MR angiography of    |              |
| the brain.        Read by  RYLAN PARK MD 2009 01:37 P    |              |
| Electronically Signed by  RYLAN PARK MD 2009 11:56 A     |              |
|  DD:    2009 01:37 P  DT:    2009 11:42 A                  |              |
| JAQUELIN/[id number]/  cc:    MD REBEL QUINTANA     |              |
| MD RYLAN LYNCH MD                         |              |
+------------------------------------------------------------------------+--------------+
 
 
 
+--------------------------------------------------------------------------+
| Procedure Note                                                           |
+--------------------------------------------------------------------------+
|   Torin Arambula - 2019  9:55 PM PDT                          |
| [id number]                                              Page  1             |
| RADIOLOGY                                            /                   |
|                                                      O/P                 |
| [facility]            NAME:  RAS RICCI                    |
| [place] [address]               MEDICAL RECORD #:  [id number]            |
| [phone]                 ACCOUNT #:  [id number]                  |
| [phone]                YOB: 1945              |
|                                                                          |
| ORDER NUMBER:  [id number]                                                   |
| EXAM DATE/TIME:  2009 12:15 P                                      |
| ORDERING PHYSICIAN:  REBEL LYNCH                              |
| ORDER DETAIL:  830 /  / HMR                                              |
| EXAM DESCRIPTION: MRI BRAIN AND MRA HEAD UN                              |
| ____________________________________________________________________     |
| MRI AND MR ANGIOGRAPHY OF THE BRAIN NONCONTRAST 2009               |
|                                                                          |
| HISTORY                                                                  |
| A 64-year-old female with involuntary motion of the neck.                |
|                                                                          |
| The patient also has a history of hypertension and diabetes. The patient |
| has had a prior kidney transplant.                                       |
|                                                                          |
| COMPARISON                                                               |
| No prior exam.                                                           |
|                                                                          |
| TECHNIQUE                                                                |
| Using a 1.5 Marichuy magnet, diffusion, axial FLAIR, axial T2, axial T1,    |
| axial GRE, and sagittal FLAIR imaging was performed. Time-of-flight MR   |
| angiography was then performed with MIP reformation imaging.             |
|                                                                          |
| FINDINGS                                                                 |
| Diffusion imaging is normal, thereby excluding acute or subacute         |
| cerebral infarction. Gray and white cerebral/cerebellar/brainstem matter |
| show normal intensity and demarcation. No demyelinating disease or       |
| lacunar infarctions. No ischemic gliosis. No cerebral edema, mass,       |
| intracranial hemorrhage, or extraaxial fluid collection can be seen. The |
| sagittal images show a normal pituitary gland, aqueduct of Sylvius, and  |
| foramen magnum. The corpus callosum is normal. Gradient echo axial       |
| images fail to show any hemosiderin in the central white matter or basal |
| ganglia.                                                                 |
|                                                                          |
| Internal auditory canals show normal caliber and CSF content. The        |
| cerebellopontine angles are clear. The nasopharyngeal airway is open.    |
| The orbits are unremarkable.                                             |
 
|                                                                          |
| The paranasal sinuses and mastoid air cells are clear.                   |
|                                                                          |
| MR ANGIOGRAPHY                                                           |
| The left posterior communicating artery is hypoplastic and the right     |
| posterior communicating artery is aplastic.                              |
|                                                                          |
| There are mild irregularities of the M1 segments of both the right and   |
| left middle cerebral arteries, due to "standing waves", an artifact.     |
|                                                                          |
| The posterior right and left cerebral arteries show rapid tapering as    |
| they course around the brainstem. This is probably artifactual due to    |
| slow flow. The distal vertebral arteries are asymmetrical in size, the   |
| left greater than the right. The basilar artery is widely patent. The    |
| distal internal carotid arteries are widely patent.                      |
|                                                                          |
| No obvious aneurysm or stenosis visualized. No obvious arteriovenous     |
| malformation.                                                            |
|                                                                          |
| CONCLUSIONS                                                              |
| 1. Normal MRI of the brain.                                              |
| 2. Anatomical variation of the [place]. Otherwise unremarkable  |
|    MR angiography of the brain.                                          |
|                                                                          |
|                                                                          |
| Read by                                                                  |
| RYLAN PARK MD 2009 01:37 P                                 |
| Electronically Signed by                                                 |
| RYLAN PARK MD 2009 11:56 A                                 |
|                                                                          |
| DD:   2009 01:37 P                                                 |
| DT:   2009 11:42 A                                                 |
| [facility]/[id number]/                                                         |
| cc:   NATALEE WILLIAMSON MD                                               |
|       REBEL LYNCH MD                                          |
|       RYLAN PARK MD                                              |
+--------------------------------------------------------------------------+
 documented in this encounter
 
 Visit Diagnoses
 Not on filedocumented in this encounter

## 2020-01-08 NOTE — XMS
Encounter Summary
  Created on: 2020
 
 Viviana Ricci
 External Reference #: [id number]
 : 47
 Sex: Female
 
 Demographics
 
 
+-----------------------+----------------------+
| Address               | [address]      |
|                       | [place]  [address] |
+-----------------------+----------------------+
| Home Phone            | [phone]      |
+-----------------------+----------------------+
| Preferred Language    | Unknown              |
+-----------------------+----------------------+
| Marital Status        | Single               |
+-----------------------+----------------------+
| Sikhism Affiliation | [id number]                 |
+-----------------------+----------------------+
| Race                  | Unknown              |
+-----------------------+----------------------+
| Ethnic Group          | Unknown              |
+-----------------------+----------------------+
 
 
 Author
 
 
+--------------+--------------------------------------------+
| Author       | [facility]  |
|              | and [place]                                |
+--------------+--------------------------------------------+
| Organization | [facility]  |
|              | and [place]                                |
+--------------+--------------------------------------------+
| Address      | Unknown                                    |
+--------------+--------------------------------------------+
| Phone        | Unavailable                                |
+--------------+--------------------------------------------+
 
 
 
 Support
 
 
+----------------+--------------+---------------------+-----------------+
| Name           | Relationship | Address             | Phone           |
+----------------+--------------+---------------------+-----------------+
| Ada/Ed Radhames | ECON         | [address]              | [phone] |
|                |              | [place]  |                 |
|                |              |  [address]              |                 |
+----------------+--------------+---------------------+-----------------+
 
 
 
 
 Care Team Providers
 
 
+------------------------+------+-----------------+
| Care Team Member Name  | Role | Phone           |
+------------------------+------+-----------------+
| Marzena Moser DO | PCP  | [phone] |
+------------------------+------+-----------------+
 
 
 
 Reason for Visit
 
 
+-------------------+----------+
| Reason            | Comments |
+-------------------+----------+
| Medication Refill |          |
+-------------------+----------+
 
 
 
 Encounter Details
 
 
+--------+--------+---------------------+----------------------+-------------------+
| Date   | Type   | Department          | Care Team            | Description       |
+--------+--------+---------------------+----------------------+-------------------+
| / | Refill |   [facility]         |   Marzena Moser  | Medication Refill |
| 2019   |        | NEPHROLOGY  [address]   | M, DO  [address]      |                   |
|        |        | [address]   | [address]      |                   |
|        |        | [place], [place]     | [place], [place]      |                   |
|        |        | [address]          | [address]  [phone]  |                   |
|        |        | [phone]        |  [phone] (Fax)  |                   |
+--------+--------+---------------------+----------------------+-------------------+
 
 
 
 Social History
 
 
+--------------+-------+-----------+--------+------+
| Tobacco Use  | Types | Packs/Day | Years  | Date |
|              |       |           | Used   |      |
+--------------+-------+-----------+--------+------+
| Never Smoker |       |           |        |      |
+--------------+-------+-----------+--------+------+
 
 
 
+---------------------+---+---+---+
| Smokeless Tobacco:  |   |   |   |
| Never Used          |   |   |   |
+---------------------+---+---+---+
 
 
 
+---------------------------------------------------------------+
| Comments: some second hand smoke exposure, but fairly minimal |
 
+---------------------------------------------------------------+
 
 
 
+-------------+-------------+---------+----------+
| Alcohol Use | Drinks/Week | oz/Week | Comments |
+-------------+-------------+---------+----------+
| No          |             |         |          |
+-------------+-------------+---------+----------+
 
 
 
+------------------+---------------+
| Sex Assigned at  | Date Recorded |
| Birth            |               |
+------------------+---------------+
| Not on file      |               |
+------------------+---------------+
 
 
 
+----------------+-------------+-------------+
| Job Start Date | Occupation  | Industry    |
+----------------+-------------+-------------+
| Not on file    | Not on file | Not on file |
+----------------+-------------+-------------+
 
 
 
+----------------+--------------+------------+
| Travel History | Travel Start | Travel End |
+----------------+--------------+------------+
 
 
 
+-------------------------------------+
| No recent travel history available. |
+-------------------------------------+
 documented as of this encounter
 
 Plan of Treatment
 
 
+--------+-----------+------------+----------------------+-------------------+
| Date   | Type      | Specialty  | Care Team            | Description       |
+--------+-----------+------------+----------------------+-------------------+
| / | Office    | Cardiology |   Tonia Wilson,    |                   |
|    | Visit     |            | ARNP  [address]   |                   |
|        |           |            | St [place], [place]  |                   |
|        |           |            | [address]  [phone]  |                   |
|        |           |            |  [phone] (Fax)  |                   |
+--------+-----------+------------+----------------------+-------------------+
| / | Hospital  | Radiology  |   Popeye Townsend, |                   |
|    | Encounter |            |  MD  [address] |                   |
|        |           |            |  [place],   |                   |
|        |           |            | [place] [address]             |                   |
|        |           |            | [phone]         |                   |
|        |           |            | [phone] (Fax)   |                   |
+--------+-----------+------------+----------------------+-------------------+
| / | Surgery   | Radiology  |   Popeye Townsend, | CV EP PPM SYSTEM  |
 
|    |           |            |  MD  [address] | IMPLANT           |
|        |           |            |  [place],   |                   |
|        |           |            | [place] [address]             |                   |
|        |           |            | [phone]         |                   |
|        |           |            | [phone] (Fax)   |                   |
+--------+-----------+------------+----------------------+-------------------+
| 02/10/ | Clinical  | Cardiology |                      |                   |
|    | Support   |            |                      |                   |
+--------+-----------+------------+----------------------+-------------------+
| / | Office    | Cardiology |   RakeshTonia andre,    |                   |
|    | Visit     |            | ARNP  [address]   |                   |
|        |           |            | [place]  |                   |
|        |           |            | [address] [phone]  |                   |
|        |           |            |  [phone] (Fax)  |                   |
+--------+-----------+------------+----------------------+-------------------+
| / | Off-Site  | Nephrology |   Marzena Moser  |                   |
|    | Visit     |            | DO ETIENNE  [address] [place] [address]      |                   |
|        |           |            | [address]      |                   |
|        |           |            | [place]      |                   |
|        |           |            | [address] [phone]  |                   |
|        |           |            |  [phone] (Fax)  |                   |
+--------+-----------+------------+----------------------+-------------------+
 documented as of this encounter
 
 Visit Diagnoses
 Not on filedocumented in this encounter

## 2020-01-08 NOTE — XMS
Encounter Summary
  Created on: 2020
 
 Viviana Ricci
 External Reference #: [id number]
 : 47
 Sex: Female
 
 Demographics
 
 
+-----------------------+----------------------+
| Address               | [address]      |
|                       | [place]  [address] |
+-----------------------+----------------------+
| Home Phone            | [phone]      |
+-----------------------+----------------------+
| Preferred Language    | Unknown              |
+-----------------------+----------------------+
| Marital Status        | Single               |
+-----------------------+----------------------+
| Episcopal Affiliation | 1009                 |
+-----------------------+----------------------+
| Race                  | Unknown              |
+-----------------------+----------------------+
| Ethnic Group          | Unknown              |
+-----------------------+----------------------+
 
 
 Author
 
 
+--------------+--------------------------------------------+
| Author       | [facility]  |
|              | and [place]                                |
+--------------+--------------------------------------------+
| Organization | [facility]  |
|              | and [place]                                |
+--------------+--------------------------------------------+
| Address      | Unknown                                    |
+--------------+--------------------------------------------+
| Phone        | Unavailable                                |
+--------------+--------------------------------------------+
 
 
 
 Support
 
 
+----------------+--------------+---------------------+-----------------+
| Name           | Relationship | Address             | Phone           |
+----------------+--------------+---------------------+-----------------+
| Ada/Ed Radhames | ECON         | [address]              | [phone] |
|                |              | [place]  |                 |
|                |              |  [address]              |                 |
+----------------+--------------+---------------------+-----------------+
 
 
 
 
 Care Team Providers
 
 
+------------------------+------+-----------------+
| Care Team Member Name  | Role | Phone           |
+------------------------+------+-----------------+
| Marzena Moser DO | PCP  | [phone] |
+------------------------+------+-----------------+
 
 
 
 Reason for Visit
 
 
+-------------------+----------+
| Reason            | Comments |
+-------------------+----------+
| Medication Refill |          |
+-------------------+----------+
 
 
 
 Encounter Details
 
 
+--------+--------+---------------------+----------------------+-------------------+
| Date   | Type   | Department          | Care Team            | Description       |
+--------+--------+---------------------+----------------------+-------------------+
| 10/02/ | Refill |   [facility]         |   Marzena Moser  | Medication Refill |
| 2018   |        | NEPHROLOGY  [address]   | M, DO  [address]      |                   |
|        |        | [address]   | [address]      |                   |
|        |        | [place], [place]     | [place], [place]      |                   |
|        |        | [address]          | [address]  [phone]  |                   |
|        |        | [phone]        |  [phone] (Fax)  |                   |
+--------+--------+---------------------+----------------------+-------------------+
 
 
 
 Social History
 
 
+--------------+-------+-----------+--------+------+
| Tobacco Use  | Types | Packs/Day | Years  | Date |
|              |       |           | Used   |      |
+--------------+-------+-----------+--------+------+
| Never Smoker |       |           |        |      |
+--------------+-------+-----------+--------+------+
 
 
 
+---------------------+---+---+---+
| Smokeless Tobacco:  |   |   |   |
| Never Used          |   |   |   |
+---------------------+---+---+---+
 
 
 
+---------------------------------------------------------------+
| Comments: some second hand smoke exposure, but fairly minimal |
 
+---------------------------------------------------------------+
 
 
 
+-------------+-------------+---------+----------+
| Alcohol Use | Drinks/Week | oz/Week | Comments |
+-------------+-------------+---------+----------+
| No          |             |         |          |
+-------------+-------------+---------+----------+
 
 
 
+------------------+---------------+
| Sex Assigned at  | Date Recorded |
| Birth            |               |
+------------------+---------------+
| Not on file      |               |
+------------------+---------------+
 
 
 
+----------------+-------------+-------------+
| Job Start Date | Occupation  | Industry    |
+----------------+-------------+-------------+
| Not on file    | Not on file | Not on file |
+----------------+-------------+-------------+
 
 
 
+----------------+--------------+------------+
| Travel History | Travel Start | Travel End |
+----------------+--------------+------------+
 
 
 
+-------------------------------------+
| No recent travel history available. |
+-------------------------------------+
 documented as of this encounter
 
 Plan of Treatment
 
 
+--------+-----------+------------+----------------------+-------------------+
| Date   | Type      | Specialty  | Care Team            | Description       |
+--------+-----------+------------+----------------------+-------------------+
| / | Office    | Cardiology |   Tonia Wilson,    |                   |
|    | Visit     |            | ARNP  [address]   |                   |
|        |           |            | [place], [place]  |                   |
|        |           |            | [address]  [phone]  |                   |
|        |           |            |  [phone] (Fax)  |                   |
+--------+-----------+------------+----------------------+-------------------+
| / | Hospital  | Radiology  |   Popeye Townsend, |                   |
|    | Encounter |            |  MD  [address] |                   |
|        |           |            |  [place],   |                   |
|        |           |            | [place] [address]             |                   |
|        |           |            | [phone]         |                   |
|        |           |            | [phone] (Fax)   |                   |
+--------+-----------+------------+----------------------+-------------------+
| / | Surgery   | Radiology  |   Popeye Townsend, | CV EP PPM SYSTEM  |
 
|    |           |            |  MD  [address] | IMPLANT           |
|        |           |            |  [place],   |                   |
|        |           |            | [place] [address]             |                   |
|        |           |            | [phone]         |                   |
|        |           |            | [phone] (Fax)   |                   |
+--------+-----------+------------+----------------------+-------------------+
| 02/10/ | Clinical  | Cardiology |                      |                   |
|    | Support   |            |                      |                   |
+--------+-----------+------------+----------------------+-------------------+
| / | Office    | Cardiology |   RakeshTonia andre,    |                   |
|    | Visit     |            | ARNP  [address]   |                   |
|        |           |            | [place]  |                   |
|        |           |            | [address] [phone]  |                   |
|        |           |            |  [phone] (Fax)  |                   |
+--------+-----------+------------+----------------------+-------------------+
| / | Off-Site  | Nephrology |   Marzena Moser  |                   |
|    | Visit     |            | DO ETIENNE  [address] [place] [address]      |                   |
|        |           |            | [address]      |                   |
|        |           |            | [place]      |                   |
|        |           |            | [address] [phone]  |                   |
|        |           |            |  [phone] (Fax)  |                   |
+--------+-----------+------------+----------------------+-------------------+
 documented as of this encounter
 
 Visit Diagnoses
 Not on filedocumented in this encounter

## 2020-01-08 NOTE — XMS
Encounter Summary
  Created on: 2020
 
 Viviana Ricci
 External Reference #: [id number]
 : 47
 Sex: Female
 
 Demographics
 
 
+-----------------------+----------------------+
| Address               | [address]      |
|                       | [place]  [address] |
+-----------------------+----------------------+
| Home Phone            | [phone]      |
+-----------------------+----------------------+
| Preferred Language    | Unknown              |
+-----------------------+----------------------+
| Marital Status        | Single               |
+-----------------------+----------------------+
| Orthodox Affiliation | [id number]                 |
+-----------------------+----------------------+
| Race                  | Unknown              |
+-----------------------+----------------------+
| Ethnic Group          | Unknown              |
+-----------------------+----------------------+
 
 
 Author
 
 
+--------------+--------------------------------------------+
| Author       | [facility] [place]  |
|              | and [place]                                |
+--------------+--------------------------------------------+
| Organization | [facility] [place]  |
|              | and [place]                                |
+--------------+--------------------------------------------+
| Address      | Unknown                                    |
+--------------+--------------------------------------------+
| Phone        | Unavailable                                |
+--------------+--------------------------------------------+
 
 
 
 Support
 
 
+----------------+--------------+---------------------+-----------------+
| Name           | Relationship | Address             | Phone           |
+----------------+--------------+---------------------+-----------------+
| Ada/Ed Radhames | ECON         | [address]              | [phone] |
|                |              | [place]  |                 |
|                |              |  [address]              |                 |
+----------------+--------------+---------------------+-----------------+
 
 
 
 
 Care Team Providers
 
 
+-----------------------+------+-------------+
| Care Team Member Name | Role | Phone       |
+-----------------------+------+-------------+
 PCP  | Unavailable |
+-----------------------+------+-------------+
 
 
 
 Encounter Details
 
 
+--------+-------------+---------------------+----------------------+---------------------+
| Date   | Type        | Department          | Care Team            | Description         |
+--------+-------------+---------------------+----------------------+---------------------+
| / | Orders Only |   [facility]         |   Marzena Moser  | Kidney transplant   |
| 2018   |             | NEPHROLOGY  [address]   | M, DO  [address]      | status (Primary Dx) |
|        |             | [address]   | [address]      |                     |
|        |             | [place], [place]     | [place], [place]      |                     |
|        |             | [address]          | [address]  [phone]  |                     |
|        |             | [phone]        |  [phone] (Fax)  |                     |
+--------+-------------+---------------------+----------------------+---------------------+
 
 
 
 Social History
 
 
+--------------+-------+-----------+--------+------+
| Tobacco Use  | Types | Packs/Day | Years  | Date |
|              |       |           | Used   |      |
+--------------+-------+-----------+--------+------+
| Never Smoker |       |           |        |      |
+--------------+-------+-----------+--------+------+
 
 
 
+---------------------+---+---+---+
| Smokeless Tobacco:  |   |   |   |
| Never Used          |   |   |   |
+---------------------+---+---+---+
 
 
 
+---------------------------------------------------------------+
| Comments: some second hand smoke exposure, but fairly minimal |
+---------------------------------------------------------------+
 
 
 
+-------------+-------------+---------+----------+
| Alcohol Use | Drinks/Week | oz/Week | Comments |
+-------------+-------------+---------+----------+
| No          |             |         |          |
+-------------+-------------+---------+----------+
 
 
 
 
+------------------+---------------+
| Sex Assigned at  | Date Recorded |
| Birth            |               |
+------------------+---------------+
| Not on file      |               |
+------------------+---------------+
 
 
 
+----------------+-------------+-------------+
| Job Start Date | Occupation  | Industry    |
+----------------+-------------+-------------+
| Not on file    | Not on file | Not on file |
+----------------+-------------+-------------+
 
 
 
+----------------+--------------+------------+
| Travel History | Travel Start | Travel End |
+----------------+--------------+------------+
 
 
 
+-------------------------------------+
| No recent travel history available. |
+-------------------------------------+
 documented as of this encounter
 
 Plan of Treatment
 
 
+--------+-----------+------------+----------------------+-------------------+
| Date   | Type      | Specialty  | Care Team            | Description       |
+--------+-----------+------------+----------------------+-------------------+
| / | Office    | Cardiology |   Tonia Wilson,    |                   |
|    | Visit     |            | ARNJESSICA  [address]   |                   |
|        |           |            | St  [place] [facility], [place]  |                   |
|        |           |            | [address]  [phone]  |                   |
|        |           |            |  [phone] (Fax)  |                   |
+--------+-----------+------------+----------------------+-------------------+
| / | Hospital  | Radiology  |   Popeye Townsend, |                   |
|    | Encounter |            |  MD  [address] |                   |
|        |           |            |  StNikkie  [place],   |                   |
|        |           |            | [place] [address]             |                   |
|        |           |            | [phone]         |                   |
|        |           |            | [phone] (Fax)   |                   |
+--------+-----------+------------+----------------------+-------------------+
| / | Surgery   | Radiology  |   Popeye Townsend, | CV EP PPM SYSTEM  |
|    |           |            |  MD  [address] | IMPLANT           |
|        |           |            |  [place]ikkie  [place],   |                   |
|        |           |            | [place] [address]             |                   |
|        |           |            | [phone]         |                   |
|        |           |            | [phone] (Fax)   |                   |
+--------+-----------+------------+----------------------+-------------------+
| 02/10/ | Clinical  | Cardiology |                      |                   |
|    | Support   |            |                      |                   |
+--------+-----------+------------+----------------------+-------------------+
| / | Office    | Cardiology |   Tonia Wilson,    |                   |
|    | Visit     |            | [facility]  [address]   |                   |
 
|        |           |            |   [place]  |                   |
|        |           |            | [address]  [phone]  |                   |
|        |           |            |  [phone] (Fax)  |                   |
+--------+-----------+------------+----------------------+-------------------+
| / | Off-Site  | Nephrology |   Marzena Moser  |                   |
|    | Visit     |            | DO ETIENNE  [address] [place] [address]      |                   |
|        |           |            | [address]      |                   |
|        |           |            | [place]      |                   |
|        |           |            | [address]  [phone]  |                   |
|        |           |            |  [phone] (Fax)  |                   |
+--------+-----------+------------+----------------------+-------------------+
 documented as of this encounter
 
 Visit Diagnoses
 
 
+--------------------------------------+
| Diagnosis                            |
+--------------------------------------+
|   Kidney transplant status - Primary |
+--------------------------------------+
 documented in this encounter

## 2020-01-08 NOTE — XMS
Encounter Summary
  Created on: 2020
 
 Viviana Ricci
 External Reference #: [id number]
 : 47
 Sex: Female
 
 Demographics
 
 
+-----------------------+----------------------+
| Address               | [address]      |
|                       | [place]  [address] |
+-----------------------+----------------------+
| Home Phone            | [phone]      |
+-----------------------+----------------------+
| Preferred Language    | Unknown              |
+-----------------------+----------------------+
| Marital Status        | Single               |
+-----------------------+----------------------+
| Mosque Affiliation | 1009                 |
+-----------------------+----------------------+
| Race                  | Unknown              |
+-----------------------+----------------------+
| Ethnic Group          | Unknown              |
+-----------------------+----------------------+
 
 
 Author
 
 
+--------------+--------------------------------------------+
| Author       | [facility] [place]  |
|              | and [place]                                |
+--------------+--------------------------------------------+
| Organization | [facility] [place]  |
|              | and [place]                                |
+--------------+--------------------------------------------+
| Address      | Unknown                                    |
+--------------+--------------------------------------------+
| Phone        | Unavailable                                |
+--------------+--------------------------------------------+
 
 
 
 Support
 
 
+----------------+--------------+---------------------+-----------------+
| Name           | Relationship | Address             | Phone           |
+----------------+--------------+---------------------+-----------------+
| Ada/Ed Radhames | ECON         | [address]              | [phone] |
|                |              | [place]  |                 |
|                |              |  [address]              |                 |
+----------------+--------------+---------------------+-----------------+
 
 
 
 
 Care Team Providers
 
 
+-----------------------+------+-------------+
| Care Team Member Name | Role | Phone       |
+-----------------------+------+-------------+
 PCP  | Unavailable |
+-----------------------+------+-------------+
 
 
 
 Reason for Visit
 
 
+-------------------+----------+
| Reason            | Comments |
+-------------------+----------+
| Medication Refill |          |
+-------------------+----------+
 
 
 
 Encounter Details
 
 
+--------+--------+---------------------+----------------------+-------------------+
| Date   | Type   | Department          | Care Team            | Description       |
+--------+--------+---------------------+----------------------+-------------------+
| / | Refill |   [facility]         |   Marzena Moser  | Medication Refill |
| 2017   |        | NEPHROLOGY  [address]   | M, DO  [address]      |                   |
|        |        | [address]   | [address]      |                   |
|        |        | [place], [place]     | [place], [place]      |                   |
|        |        | [address]          | [address]  [phone]  |                   |
|        |        | [phone]        |  [phone] (Fax)  |                   |
+--------+--------+---------------------+----------------------+-------------------+
 
 
 
 Social History
 
 
+--------------+-------+-----------+--------+------+
| Tobacco Use  | Types | Packs/Day | Years  | Date |
|              |       |           | Used   |      |
+--------------+-------+-----------+--------+------+
| Never Smoker |       |           |        |      |
+--------------+-------+-----------+--------+------+
 
 
 
+---------------------+---+---+---+
| Smokeless Tobacco:  |   |   |   |
| Never Used          |   |   |   |
+---------------------+---+---+---+
 
 
 
+---------------------------------------------------------------+
| Comments: some second hand smoke exposure, but fairly minimal |
 
+---------------------------------------------------------------+
 
 
 
+-------------+-------------+---------+----------+
| Alcohol Use | Drinks/Week | oz/Week | Comments |
+-------------+-------------+---------+----------+
| No          |             |         |          |
+-------------+-------------+---------+----------+
 
 
 
+------------------+---------------+
| Sex Assigned at  | Date Recorded |
| Birth            |               |
+------------------+---------------+
| Not on file      |               |
+------------------+---------------+
 
 
 
+----------------+-------------+-------------+
| Job Start Date | Occupation  | Industry    |
+----------------+-------------+-------------+
| Not on file    | Not on file | Not on file |
+----------------+-------------+-------------+
 
 
 
+----------------+--------------+------------+
| Travel History | Travel Start | Travel End |
+----------------+--------------+------------+
 
 
 
+-------------------------------------+
| No recent travel history available. |
+-------------------------------------+
 documented as of this encounter
 
 Plan of Treatment
 
 
+--------+-----------+------------+----------------------+-------------------+
| Date   | Type      | Specialty  | Care Team            | Description       |
+--------+-----------+------------+----------------------+-------------------+
| / | Office    | Cardiology |   HellalfredoTonia,    |                   |
|    | Visit     |            | ARNP  [address]   |                   |
|        |           |            | St  [place], [place]  |                   |
|        |           |            | [address]  [phone]  |                   |
|        |           |            |  [phone] (Fax)  |                   |
+--------+-----------+------------+----------------------+-------------------+
| / | Hospital  | Radiology  |   Popeye Townsend, |                   |
|    | Encounter |            |  MD  [address] |                   |
|        |           |            |  St.  [place],   |                   |
|        |           |            | [place] [address]             |                   |
|        |           |            | [phone]         |                   |
|        |           |            | [phone] (Fax)   |                   |
+--------+-----------+------------+----------------------+-------------------+
| / | Surgery   | Radiology  |   Popeye Townsend, | CV EP PPM SYSTEM  |
 
|    |           |            |  MD  [address] | IMPLANT           |
|        |           |            |  St.  [place],   |                   |
|        |           |            | [place] [address]             |                   |
|        |           |            | [phone]         |                   |
|        |           |            | [phone] (Fax)   |                   |
+--------+-----------+------------+----------------------+-------------------+
| 02/10/ | Clinical  | Cardiology |                      |                   |
|    | Support   |            |                      |                   |
+--------+-----------+------------+----------------------+-------------------+
| / | Office    | Cardiology |   Tonia Wilson,    |                   |
|    | Visit     |            | ARNP  [address]   |                   |
|        |           |            | [place]  |                   |
|        |           |            | [address]  [phone]  |                   |
|        |           |            |  [phone] (Fax)  |                   |
+--------+-----------+------------+----------------------+-------------------+
| / | Off-Site  | Nephrology |   Marzena Moser  |                   |
|    | Visit     |            | DO ETIENNE  [address] [place] [address]      |                   |
|        |           |            | [address]      |                   |
|        |           |            | [place]      |                   |
|        |           |            | [address]  [phone]  |                   |
|        |           |            |  [phone] (Fax)  |                   |
+--------+-----------+------------+----------------------+-------------------+
 documented as of this encounter
 
 Visit Diagnoses
 Not on filedocumented in this encounter

## 2020-01-08 NOTE — XMS
Encounter Summary
  Created on: 2020
 
 Viviana Ricci
 External Reference #: [id number]
 : 47
 Sex: Female
 
 Demographics
 
 
+-----------------------+----------------------+
| Address               | [address]      |
|                       | [place]  [address] |
+-----------------------+----------------------+
| Home Phone            | [phone]      |
+-----------------------+----------------------+
| Preferred Language    | Unknown              |
+-----------------------+----------------------+
| Marital Status        | Single               |
+-----------------------+----------------------+
| Mor[employer] Affiliation | [id number]                 |
+-----------------------+----------------------+
| Race                  | Unknown              |
+-----------------------+----------------------+
| Ethnic Group          | Unknown              |
+-----------------------+----------------------+
 
 
 Author
 
 
+--------------+--------------------------------------------+
| Author       | [facility]  |
|              | and [facility]                                |
+--------------+--------------------------------------------+
| Organization | [facility]  |
|              | and [place]                                |
+--------------+--------------------------------------------+
| Address      | Unknown                                    |
+--------------+--------------------------------------------+
| Phone        | Unavailable                                |
+--------------+--------------------------------------------+
 
 
 
 Support
 
 
+----------------+--------------+---------------------+-----------------+
| Name           | Relationship | Address             | Phone           |
+----------------+--------------+---------------------+-----------------+
| Ada/Ed Radhames | ECON         | [place]              | [phone] |
|                |              | [place]  |                 |
|                |              |  [address]              |                 |
+----------------+--------------+---------------------+-----------------+
 
 
 
 
 Care Team Providers
 
 
+-----------------------+------+-------------+
| Care Team Member Name | Role | Phone       |
+-----------------------+------+-------------+
 PCP  | Unavailable |
+-----------------------+------+-------------+
 
 
 
 Reason for Visit
 
 
+-------------------+----------+
| Reason            | Comments |
+-------------------+----------+
| Medication Refill |          |
+-------------------+----------+
 
 
 
 Encounter Details
 
 
+--------+--------+---------------------+----------------------+-------------------+
| Date   | Type   | Department          | Care Team            | Description       |
+--------+--------+---------------------+----------------------+-------------------+
| / | Refill |   [facility]         |   Marzena Moser  | Medication Refill |
| 2016   |        | NEPHROLOGY  [address]   | M, DO  [address]      |                   |
|        |        | [address]   | [address]      |                   |
|        |        | [place], [place]     | [place], [place]      |                   |
|        |        | [address]          | [address]  [phone]  |                   |
|        |        | [phone]        |  [phone] (Fax)  |                   |
+--------+--------+---------------------+----------------------+-------------------+
 
 
 
 Social History
 
 
+--------------+-------+-----------+--------+------+
| Tobacco Use  | Types | Packs/Day | Years  | Date |
|              |       |           | Used   |      |
+--------------+-------+-----------+--------+------+
| Never Smoker |       |           |        |      |
+--------------+-------+-----------+--------+------+
 
 
 
+---------------------+---+---+---+
| Smokeless Tobacco:  |   |   |   |
| Never Used          |   |   |   |
+---------------------+---+---+---+
 
 
 
+---------------------------------------------------------------+
| Comments: some second hand smoke exposure, but fairly minimal |
 
+---------------------------------------------------------------+
 
 
 
+-------------+-------------+---------+----------+
| Alcohol Use | Drinks/Week | oz/Week | Comments |
+-------------+-------------+---------+----------+
| No          |             |         |          |
+-------------+-------------+---------+----------+
 
 
 
+------------------+---------------+
| Sex Assigned at  | Date Recorded |
| Birth            |               |
+------------------+---------------+
| Not on file      |               |
+------------------+---------------+
 
 
 
+----------------+-------------+-------------+
| Job Start Date | Occupation  | Industry    |
+----------------+-------------+-------------+
| Not on file    | Not on file | Not on file |
+----------------+-------------+-------------+
 
 
 
+----------------+--------------+------------+
| Travel History | Travel Start | Travel End |
+----------------+--------------+------------+
 
 
 
+-------------------------------------+
| No recent travel history available. |
+-------------------------------------+
 documented as of this encounter
 
 Plan of Treatment
 
 
+--------+-----------+------------+----------------------+-------------------+
| Date   | Type      | Specialty  | Care Team            | Description       |
+--------+-----------+------------+----------------------+-------------------+
| / | Office    | Cardiology |   HellalfredoTonia,    |                   |
|    | Visit     |            | ARNP  [address]   |                   |
|        |           |            | St  [place], [place]  |                   |
|        |           |            | [address]  [phone]  |                   |
|        |           |            |  [phone] (Fax)  |                   |
+--------+-----------+------------+----------------------+-------------------+
| / | Hospital  | Radiology  |   Popeye Townsend, |                   |
|    | Encounter |            |  MD  [address] |                   |
|        |           |            |  St.  [place],   |                   |
|        |           |            | [place] [address]             |                   |
|        |           |            | [phone]         |                   |
|        |           |            | [phone] (Fax)   |                   |
+--------+-----------+------------+----------------------+-------------------+
| / | Surgery   | Radiology  |   Popeye Townsend, | CV EP PPM SYSTEM  |
 
|    |           |            |  MD  [address] | IMPLANT           |
|        |           |            |  St.  [place],   |                   |
|        |           |            | [place] [address]             |                   |
|        |           |            | [phone]         |                   |
|        |           |            | [phone] (Fax)   |                   |
+--------+-----------+------------+----------------------+-------------------+
| 02/10/ | Clinical  | Cardiology |                      |                   |
|    | Support   |            |                      |                   |
+--------+-----------+------------+----------------------+-------------------+
| / | Office    | Cardiology |   Tonia Wilson,    |                   |
|    | Visit     |            | ARNP  [address]   |                   |
|        |           |            | [place]  |                   |
|        |           |            | [address]  [phone]  |                   |
|        |           |            |  [phone] (Fax)  |                   |
+--------+-----------+------------+----------------------+-------------------+
| / | Off-Site  | Nephrology |   Marzena Moser  |                   |
|    | Visit     |            | DO ETIENNE  [address] [place] [address]      |                   |
|        |           |            | [address]      |                   |
|        |           |            | [place]      |                   |
|        |           |            | [address]  [phone]  |                   |
|        |           |            |  [phone] (Fax)  |                   |
+--------+-----------+------------+----------------------+-------------------+
 documented as of this encounter
 
 Visit Diagnoses
 Not on filedocumented in this encounter

## 2020-01-08 NOTE — XMS
Encounter Summary
  Created on: 2020
 
 Viviana Ricci
 External Reference #: [id number]
 : 47
 Sex: Female
 
 Demographics
 
 
+-----------------------+----------------------+
| Address               | [address]      |
|                       | [place]  [address] |
+-----------------------+----------------------+
| Home Phone            | [phone]      |
+-----------------------+----------------------+
| Preferred Language    | Unknown              |
+-----------------------+----------------------+
| Marital Status        | Single               |
+-----------------------+----------------------+
| Muslim Affiliation | [id number]                 |
+-----------------------+----------------------+
| Race                  | Unknown              |
+-----------------------+----------------------+
| Ethnic Group          | Unknown              |
+-----------------------+----------------------+
 
 
 Author
 
 
+--------------+--------------------------------------------+
| Author       | [facility]  |
|              | and [place]                                |
+--------------+--------------------------------------------+
| Organization | [facility]  |
|              | and [place]                                |
+--------------+--------------------------------------------+
| Address      | Unknown                                    |
+--------------+--------------------------------------------+
| Phone        | Unavailable                                |
+--------------+--------------------------------------------+
 
 
 
 Support
 
 
+----------------+--------------+---------------------+-----------------+
| Name           | Relationship | Address             | Phone           |
+----------------+--------------+---------------------+-----------------+
| Ada/Ed Radhames | ECON         | [address]              | [phone] |
|                |              | [place]  |                 |
|                |              |  [address]              |                 |
+----------------+--------------+---------------------+-----------------+
 
 
 
 
 Care Team Providers
 
 
+-----------------------+------+-------------+
| Care Team Member Name | Role | Phone       |
+-----------------------+------+-------------+
 PCP  | Unavailable |
+-----------------------+------+-------------+
 
 
 
 Encounter Details
 
 
+--------+-------------+---------------------+----------------------+----------------------+
| Date   | Type        | Department          | Care Team            | Description          |
+--------+-------------+---------------------+----------------------+----------------------+
| / | Orders Only |   [facility]         |   Marzena Moser  | Kidney replaced by   |
| 2018   |             | NEPHROLOGY  [address]   | M,   [address]      | transplant (Primary  |
|        |             | [address]   | [address]      | Dx); Dysuria         |
|        |             | [place], [place]     | [place], [place]      |                      |
|        |             | [address]          | [address]  [phone]  |                      |
|        |             | [phone]        |  [phone] (Fax)  |                      |
+--------+-------------+---------------------+----------------------+----------------------+
 
 
 
 Social History
 
 
+--------------+-------+-----------+--------+------+
| Tobacco Use  | Types | Packs/Day | Years  | Date |
|              |       |           | Used   |      |
+--------------+-------+-----------+--------+------+
| Never Smoker |       |           |        |      |
+--------------+-------+-----------+--------+------+
 
 
 
+---------------------+---+---+---+
| Smokeless Tobacco:  |   |   |   |
| Never Used          |   |   |   |
+---------------------+---+---+---+
 
 
 
+---------------------------------------------------------------+
| Comments: some second hand smoke exposure, but fairly minimal |
+---------------------------------------------------------------+
 
 
 
+-------------+-------------+---------+----------+
| Alcohol Use | Drinks/Week | oz/Week | Comments |
+-------------+-------------+---------+----------+
| No          |             |         |          |
+-------------+-------------+---------+----------+
 
 
 
 
+------------------+---------------+
| Sex Assigned at  | Date Recorded |
| Birth            |               |
+------------------+---------------+
| Not on file      |               |
+------------------+---------------+
 
 
 
+----------------+-------------+-------------+
| Job Start Date | Occupation  | Industry    |
+----------------+-------------+-------------+
| Not on file    | Not on file | Not on file |
+----------------+-------------+-------------+
 
 
 
+----------------+--------------+------------+
| Travel History | Travel Start | Travel End |
+----------------+--------------+------------+
 
 
 
+-------------------------------------+
| No recent travel history available. |
+-------------------------------------+
 documented as of this encounter
 
 Plan of Treatment
 
 
+--------+-----------+------------+----------------------+-------------------+
| Date   | Type      | Specialty  | Care Team            | Description       |
+--------+-----------+------------+----------------------+-------------------+
| / | Office    | Cardiology |   Tonia Wilson,    |                   |
| 2020   | Visit     |            | ARNJESSICA  [address]   |                   |
|        |           |            | St  [place]  |                   |
|        |           |            | [address]  [phone]  |                   |
|        |           |            |  [phone] (Fax)  |                   |
+--------+-----------+------------+----------------------+-------------------+
| / | Hospital  | Radiology  |   Popeye Townsend, |                   |
|    | Encounter |            |  MD  [address] |                   |
|        |           |            |  [place],   |                   |
|        |           |            | [place] [address]             |                   |
|        |           |            | [phone]         |                   |
|        |           |            | [phone] (Fax)   |                   |
+--------+-----------+------------+----------------------+-------------------+
| / | Surgery   | Radiology  |   Popeye Townsend, | CV EP PPM SYSTEM  |
|    |           |            |  MD  [address] | IMPLANT           |
|        |           |            |  St.  [place],   |                   |
|        |           |            | [place] [address]             |                   |
|        |           |            | [phone]         |                   |
|        |           |            | [phone] (Fax)   |                   |
+--------+-----------+------------+----------------------+-------------------+
| 02/10/ | Clinical  | Cardiology |                      |                   |
|    | Support   |            |                      |                   |
+--------+-----------+------------+----------------------+-------------------+
| / | Office    | Cardiology |   Tonia Wilson,    |                   |
|    | Visit     |            | [facility]  [address]   |                   |
 
|        |           |            |   [place]  |                   |
|        |           |            | [address]  [phone]  |                   |
|        |           |            |  [phone] (Fax)  |                   |
+--------+-----------+------------+----------------------+-------------------+
| / | Off-Site  | Nephrology |   Marzena oMser  |                   |
|    | Visit     |            | DO ETIENNE  [address] [place] [address]      |                   |
|        |           |            | [address]      |                   |
|        |           |            | [place]      |                   |
|        |           |            | [address]  [phone]  |                   |
|        |           |            |  [phone] (Fax)  |                   |
+--------+-----------+------------+----------------------+-------------------+
 documented as of this encounter
 
 Visit Diagnoses
 
 
+-------------------------------------------+
| Diagnosis                                 |
+-------------------------------------------+
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
+-------------------------------------------+
|   Dysuria                                 |
+-------------------------------------------+
 documented in this encounter

## 2020-01-08 NOTE — XMS
Encounter Summary
  Created on: 2020
 
 Viviana Ricci
 External Reference #: [id number]
 : 47
 Sex: Female
 
 Demographics
 
 
+-----------------------+----------------------+
| Address               | [address]      |
|                       | [place]  [address] |
+-----------------------+----------------------+
| Home Phone            | [phone]      |
+-----------------------+----------------------+
| Preferred Language    | Unknown              |
+-----------------------+----------------------+
| Marital Status        | Single               |
+-----------------------+----------------------+
| Mor[employer] Affiliation | [id number]                 |
+-----------------------+----------------------+
| Race                  | Unknown              |
+-----------------------+----------------------+
| Ethnic Group          | Unknown              |
+-----------------------+----------------------+
 
 
 Author
 
 
+--------------+--------------------------------------------+
| Author       | [facility]  |
|              | and [place]                                |
+--------------+--------------------------------------------+
| Organization | [facility]  |
|              | and [place]                                |
+--------------+--------------------------------------------+
| Address      | Unknown                                    |
+--------------+--------------------------------------------+
| Phone        | Unavailable                                |
+--------------+--------------------------------------------+
 
 
 
 Support
 
 
+----------------+--------------+---------------------+-----------------+
| Name           | Relationship | Address             | Phone           |
+----------------+--------------+---------------------+-----------------+
| Ada/Ed Radhames | ECON         | [address]              | [phone] |
|                |              | [place]  |                 |
|                |              |  [address]              |                 |
+----------------+--------------+---------------------+-----------------+
 
 
 
 
 Care Team Providers
 
 
+------------------------+------+-----------------+
| Care Team Member Name  | Role | Phone           |
+------------------------+------+-----------------+
| Marzena Moser DO | PCP  | [phone] |
+------------------------+------+-----------------+
 
 
 
 Reason for Visit
 
 
+--------+----------+
| Reason | Comments |
+--------+----------+
| Other  |          |
+--------+----------+
 
 
 
 Encounter Details
 
 
+--------+-----------+---------------------+----------------------+-------------+
| Date   | Type      | Department          | Care Team            | Description |
+--------+-----------+---------------------+----------------------+-------------+
| / | Telephone |   [facility]         |   Marzena Moser  | Other       |
| 2019   |           | NEPHROLOGY  [address]   | M, DO  [address]      |             |
|        |           | [address]   | [address]      |             |
|        |           | [place], [place]     | [place], [place]      |             |
|        |           | [address]          | [address]  [phone]  |             |
|        |           | [phone]        |  [phone] (Fax)  |             |
+--------+-----------+---------------------+----------------------+-------------+
 
 
 
 Social History
 
 
+--------------+-------+-----------+--------+------+
| Tobacco Use  | Types | Packs/Day | Years  | Date |
|              |       |           | Used   |      |
+--------------+-------+-----------+--------+------+
| Never Smoker |       |           |        |      |
+--------------+-------+-----------+--------+------+
 
 
 
+---------------------+---+---+---+
| Smokeless Tobacco:  |   |   |   |
| Never Used          |   |   |   |
+---------------------+---+---+---+
 
 
 
+---------------------------------------------------------------+
| Comments: some second hand smoke exposure, but fairly minimal |
 
+---------------------------------------------------------------+
 
 
 
+-------------+-------------+---------+----------+
| Alcohol Use | Drinks/Week | oz/Week | Comments |
+-------------+-------------+---------+----------+
| No          |             |         |          |
+-------------+-------------+---------+----------+
 
 
 
+------------------+---------------+
| Sex Assigned at  | Date Recorded |
| Birth            |               |
+------------------+---------------+
| Not on file      |               |
+------------------+---------------+
 
 
 
+----------------+-------------+-------------+
| Job Start Date | Occupation  | Industry    |
+----------------+-------------+-------------+
| Not on file    | Not on file | Not on file |
+----------------+-------------+-------------+
 
 
 
+----------------+--------------+------------+
| Travel History | Travel Start | Travel End |
+----------------+--------------+------------+
 
 
 
+-------------------------------------+
| No recent travel history available. |
+-------------------------------------+
 documented as of this encounter
 
 Plan of Treatment
 
 
+--------+-----------+------------+----------------------+-------------------+
| Date   | Type      | Specialty  | Care Team            | Description       |
+--------+-----------+------------+----------------------+-------------------+
| / | Office    | Cardiology |   Tonia Wilson,    |                   |
| 2020   | Visit     |            | ARNJESSICA  [address]   |                   |
|        |           |            | St [place], [place]  |                   |
|        |           |            | [address]  [phone]  |                   |
|        |           |            |  [phone] (Fax)  |                   |
+--------+-----------+------------+----------------------+-------------------+
| / | Hospital  | Radiology  |   Popeye Townsend, |                   |
|    | Encounter |            |  MD [address] |                   |
|        |           |            |  [place],   |                   |
|        |           |            | [place] [address]             |                   |
|        |           |            | [phone]         |                   |
|        |           |            | [phone] (Fax)   |                   |
+--------+-----------+------------+----------------------+-------------------+
| / | Surgery   | Radiology  |   Popeye Twonsend, | CV EP PPM SYSTEM  |
 
|    |           |            |  MD  [address] | IMPLANT           |
|        |           |            |  [place],   |                   |
|        |           |            | [place] [address]             |                   |
|        |           |            | [phone]         |                   |
|        |           |            | [phone] (Fax)   |                   |
+--------+-----------+------------+----------------------+-------------------+
| 02/10/ | Clinical  | Cardiology |                      |                   |
|    | Support   |            |                      |                   |
+--------+-----------+------------+----------------------+-------------------+
| / | Office    | Cardiology |   Tonia Wilson,    |                   |
|    | Visit     |            | BELKIS  [address]   |                   |
|        |           |            | [place]  |                   |
|        |           |            | [address]  [phone]  |                   |
|        |           |            |  [phone] (Fax)  |                   |
+--------+-----------+------------+----------------------+-------------------+
| / | Off-Site  | Nephrology |   Marzena Moser  |                   |
|    | Visit     |            | M, DO  [address]      |                   |
|        |           |            | [address]      |                   |
|        |           |            | [place]      |                   |
|        |           |            | [address] [phone]  |                   |
|        |           |            |  [phone] (Fax)  |                   |
+--------+-----------+------------+----------------------+-------------------+
 documented as of this encounter
 
 Visit Diagnoses
 Not on filedocumented in this encounter

## 2020-01-08 NOTE — XMS
Encounter Summary
  Created on: 2020
 
 Viviana Ricci
 External Reference #: [id number]
 : 47
 Sex: Female
 
 Demographics
 
 
+-----------------------+----------------------+
| Address               | [address]      |
|                       | [place]  [address] |
+-----------------------+----------------------+
| Home Phone            | [phone]      |
+-----------------------+----------------------+
| Preferred Language    | Unknown              |
+-----------------------+----------------------+
| Marital Status        | Single               |
+-----------------------+----------------------+
| Tenriism Affiliation | [id number]                 |
+-----------------------+----------------------+
| Race                  | Unknown              |
+-----------------------+----------------------+
| Ethnic Group          | Unknown              |
+-----------------------+----------------------+
 
 
 Author
 
 
+--------------+--------------------------------------------+
| Author       | [facility]  |
|              | and [place]                                |
+--------------+--------------------------------------------+
| Organization | [facility]  |
|              | and [place]                                |
+--------------+--------------------------------------------+
| Address      | Unknown                                    |
+--------------+--------------------------------------------+
| Phone        | Unavailable                                |
+--------------+--------------------------------------------+
 
 
 
 Support
 
 
+----------------+--------------+---------------------+-----------------+
| Name           | Relationship | Address             | Phone           |
+----------------+--------------+---------------------+-----------------+
| Ada/Ed Radhames | ECON         | [address]              | [phone] |
|                |              | [place]  |                 |
|                |              |  [address]              |                 |
+----------------+--------------+---------------------+-----------------+
 
 
 
 
 Care Team Providers
 
 
+-----------------------+------+-------------+
| Care Team Member Name | Role | Phone       |
+-----------------------+------+-------------+
 PCP  | Unavailable |
+-----------------------+------+-------------+
 
 
 
 Reason for Visit
 
 
+-------------------+----------+
| Reason            | Comments |
+-------------------+----------+
| Medication Refill |          |
+-------------------+----------+
 
 
 
 Encounter Details
 
 
+--------+--------+---------------------+----------------------+-------------------+
| Date   | Type   | Department          | Care Team            | Description       |
+--------+--------+---------------------+----------------------+-------------------+
| / | Refill |   [facility]         |   Marzena Moser  | Medication Refill |
|    |        | NEPHROLOGY  [address]   | M, DO  [address]      |                   |
|        |        | [address]   | [address]      |                   |
|        |        | [place], [place]     | [place], [place]      |                   |
|        |        | [address]          | [address]  [phone]  |                   |
|        |        | [phone]        |  [phone] (Fax)  |                   |
+--------+--------+---------------------+----------------------+-------------------+
 
 
 
 Social History
 
 
+--------------+-------+-----------+--------+------+
| Tobacco Use  | Types | Packs/Day | Years  | Date |
|              |       |           | Used   |      |
+--------------+-------+-----------+--------+------+
| Never Smoker |       |           |        |      |
+--------------+-------+-----------+--------+------+
 
 
 
+---------------------+---+---+---+
| Smokeless Tobacco:  |   |   |   |
| Never Used          |   |   |   |
+---------------------+---+---+---+
 
 
 
+---------------------------------------------------------------+
| Comments: some second hand smoke exposure, but fairly minimal |
 
+---------------------------------------------------------------+
 
 
 
+-------------+-------------+---------+----------+
| Alcohol Use | Drinks/Week | oz/Week | Comments |
+-------------+-------------+---------+----------+
| No          |             |         |          |
+-------------+-------------+---------+----------+
 
 
 
+------------------+---------------+
| Sex Assigned at  | Date Recorded |
| Birth            |               |
+------------------+---------------+
| Not on file      |               |
+------------------+---------------+
 
 
 
+----------------+-------------+-------------+
| Job Start Date | Occupation  | Industry    |
+----------------+-------------+-------------+
| Not on file    | Not on file | Not on file |
+----------------+-------------+-------------+
 
 
 
+----------------+--------------+------------+
| Travel History | Travel Start | Travel End |
+----------------+--------------+------------+
 
 
 
+-------------------------------------+
| No recent travel history available. |
+-------------------------------------+
 documented as of this encounter
 
 Plan of Treatment
 
 
+--------+-----------+------------+----------------------+-------------------+
| Date   | Type      | Specialty  | Care Team            | Description       |
+--------+-----------+------------+----------------------+-------------------+
| / | Office    | Cardiology |   HellalfredoTonia,    |                   |
|    | Visit     |            | ARNP  [address]   |                   |
|        |           |            | St  [place], [place]  |                   |
|        |           |            | [address]  [phone]  |                   |
|        |           |            |  [phone] (Fax)  |                   |
+--------+-----------+------------+----------------------+-------------------+
| / | Hospital  | Radiology  |   Popeye Townsend, |                   |
|    | Encounter |            |  MD  [address] |                   |
|        |           |            |  St.  [place],   |                   |
|        |           |            | [place] [address]             |                   |
|        |           |            | [phone]         |                   |
|        |           |            | [phone] (Fax)   |                   |
+--------+-----------+------------+----------------------+-------------------+
| / | Surgery   | Radiology  |   Popeye Townsend, | CV EP PPM SYSTEM  |
 
|    |           |            |  MD  [address] | IMPLANT           |
|        |           |            |  St.  [place],   |                   |
|        |           |            | [place] [address]             |                   |
|        |           |            | [phone]         |                   |
|        |           |            | [phone] (Fax)   |                   |
+--------+-----------+------------+----------------------+-------------------+
| 02/10/ | Clinical  | Cardiology |                      |                   |
|    | Support   |            |                      |                   |
+--------+-----------+------------+----------------------+-------------------+
| / | Office    | Cardiology |   Tonia Wilson,    |                   |
|    | Visit     |            | ARNP  [address]   |                   |
|        |           |            | [place]  |                   |
|        |           |            | [address]  [phone]  |                   |
|        |           |            |  [phone] (Fax)  |                   |
+--------+-----------+------------+----------------------+-------------------+
| / | Off-Site  | Nephrology |   Marzena Moser  |                   |
|    | Visit     |            | DO ETIENNE  [address] [place] [address]      |                   |
|        |           |            | [address]      |                   |
|        |           |            | [place]      |                   |
|        |           |            | [address]  [phone]  |                   |
|        |           |            |  [phone] (Fax)  |                   |
+--------+-----------+------------+----------------------+-------------------+
 documented as of this encounter
 
 Visit Diagnoses
 Not on filedocumented in this encounter

## 2020-01-08 NOTE — XMS
Encounter Summary
  Created on: 2020
 
 Viviana Ricci
 External Reference #: [id number]
 : 47
 Sex: Female
 
 Demographics
 
 
+-----------------------+----------------------+
| Address               | [address]      |
|                       | [place]  [address] |
+-----------------------+----------------------+
| Home Phone            | [phone]      |
+-----------------------+----------------------+
| Preferred Language    | Unknown              |
+-----------------------+----------------------+
| Marital Status        | Single               |
+-----------------------+----------------------+
| S[employer] Affiliation | [id number]                 |
+-----------------------+----------------------+
| Race                  | Unknown              |
+-----------------------+----------------------+
| Ethnic Group          | Unknown              |
+-----------------------+----------------------+
 
 
 Author
 
 
+--------------+--------------------------------------------+
| Author       | [facility]  |
|              | and [place]                                |
+--------------+--------------------------------------------+
| Organization | [facility]  |
|              | and [facility]                                |
+--------------+--------------------------------------------+
| Address      | Unknown                                    |
+--------------+--------------------------------------------+
| Phone        | Unavailable                                |
+--------------+--------------------------------------------+
 
 
 
 Support
 
 
+----------------+--------------+---------------------+-----------------+
| Name           | Relationship | Address             | Phone           |
+----------------+--------------+---------------------+-----------------+
| Ada/Ed Radhames | ECON         | [place]              | [phone] |
|                |              | [place]  |                 |
|                |              |  [address]              |                 |
+----------------+--------------+---------------------+-----------------+
 
 
 
 
 Care Team Providers
 
 
+-----------------------+------+-------------+
| Care Team Member Name | Role | Phone       |
+-----------------------+------+-------------+
 PCP  | Unavailable |
+-----------------------+------+-------------+
 
 
 
 Reason for Visit
 
 
+-------------------+----------+
| Reason            | Comments |
+-------------------+----------+
| Medication Refill |          |
+-------------------+----------+
 
 
 
 Encounter Details
 
 
+--------+--------+---------------------+----------------------+-------------------+
| Date   | Type   | Department          | Care Team            | Description       |
+--------+--------+---------------------+----------------------+-------------------+
| / | Refill |   [facility]         |   Marzena Moser  | Medication Refill |
| 2016   |        | NEPHROLOGY  [address]   | M, DO  [address]      |                   |
|        |        | [address]   | [address]      |                   |
|        |        | [place], [place]     | [place], [place]      |                   |
|        |        | [address]          | [address]  [phone]  |                   |
|        |        | [phone]        |  [phone] (Fax)  |                   |
+--------+--------+---------------------+----------------------+-------------------+
 
 
 
 Social History
 
 
+--------------+-------+-----------+--------+------+
| Tobacco Use  | Types | Packs/Day | Years  | Date |
|              |       |           | Used   |      |
+--------------+-------+-----------+--------+------+
| Never Smoker |       |           |        |      |
+--------------+-------+-----------+--------+------+
 
 
 
+---------------------+---+---+---+
| Smokeless Tobacco:  |   |   |   |
| Never Used          |   |   |   |
+---------------------+---+---+---+
 
 
 
+---------------------------------------------------------------+
| Comments: some second hand smoke exposure, but fairly minimal |
 
+---------------------------------------------------------------+
 
 
 
+-------------+-------------+---------+----------+
| Alcohol Use | Drinks/Week | oz/Week | Comments |
+-------------+-------------+---------+----------+
| No          |             |         |          |
+-------------+-------------+---------+----------+
 
 
 
+------------------+---------------+
| Sex Assigned at  | Date Recorded |
| Birth            |               |
+------------------+---------------+
| Not on file      |               |
+------------------+---------------+
 
 
 
+----------------+-------------+-------------+
| Job Start Date | Occupation  | Industry    |
+----------------+-------------+-------------+
| Not on file    | Not on file | Not on file |
+----------------+-------------+-------------+
 
 
 
+----------------+--------------+------------+
| Travel History | Travel Start | Travel End |
+----------------+--------------+------------+
 
 
 
+-------------------------------------+
| No recent travel history available. |
+-------------------------------------+
 documented as of this encounter
 
 Plan of Treatment
 
 
+--------+-----------+------------+----------------------+-------------------+
| Date   | Type      | Specialty  | Care Team            | Description       |
+--------+-----------+------------+----------------------+-------------------+
| / | Office    | Cardiology |   HellalfredoTonia,    |                   |
|    | Visit     |            | ARNP  [address]   |                   |
|        |           |            | St  [place], [place]  |                   |
|        |           |            | [address]  [phone]  |                   |
|        |           |            |  [phone] (Fax)  |                   |
+--------+-----------+------------+----------------------+-------------------+
| / | Hospital  | Radiology  |   Popeye Townsend, |                   |
|    | Encounter |            |  MD  [address] |                   |
|        |           |            |  St.  [place],   |                   |
|        |           |            | [place] [address]             |                   |
|        |           |            | [phone]         |                   |
|        |           |            | [phone] (Fax)   |                   |
+--------+-----------+------------+----------------------+-------------------+
| / | Surgery   | Radiology  |   Popeye Townsend, | CV EP PPM SYSTEM  |
 
|    |           |            |  MD  [address] | IMPLANT           |
|        |           |            |  St.  [place],   |                   |
|        |           |            | [place] [address]             |                   |
|        |           |            | [phone]         |                   |
|        |           |            | [phone] (Fax)   |                   |
+--------+-----------+------------+----------------------+-------------------+
| 02/10/ | Clinical  | Cardiology |                      |                   |
|    | Support   |            |                      |                   |
+--------+-----------+------------+----------------------+-------------------+
| / | Office    | Cardiology |   Tonia Wilson,    |                   |
|    | Visit     |            | ARNP  [address]   |                   |
|        |           |            | [place]  |                   |
|        |           |            | [address]  [phone]  |                   |
|        |           |            |  [phone] (Fax)  |                   |
+--------+-----------+------------+----------------------+-------------------+
| / | Off-Site  | Nephrology |   Marzena Moser  |                   |
|    | Visit     |            | DO ETIENNE  [address] [place] [address]      |                   |
|        |           |            | [address]      |                   |
|        |           |            | [place]      |                   |
|        |           |            | [address]  [phone]  |                   |
|        |           |            |  [phone] (Fax)  |                   |
+--------+-----------+------------+----------------------+-------------------+
 documented as of this encounter
 
 Visit Diagnoses
 Not on filedocumented in this encounter

## 2020-01-08 NOTE — XMS
Encounter Summary
  Created on: 2020
 
 Viviana Ricci
 External Reference #: [id number]
 : 47
 Sex: Female
 
 Demographics
 
 
+-----------------------+----------------------+
| Address               | [address]      |
|                       | [place]  [address] |
+-----------------------+----------------------+
| Home Phone            | [phone]      |
+-----------------------+----------------------+
| Preferred Language    | Unknown              |
+-----------------------+----------------------+
| Marital Status        | Single               |
+-----------------------+----------------------+
| [employer] Affiliation | [id number]                 |
+-----------------------+----------------------+
| Race                  | Unknown              |
+-----------------------+----------------------+
| Ethnic Group          | Unknown              |
+-----------------------+----------------------+
 
 
 Author
 
 
+--------------+--------------------------------------------+
| Author       | [facility]  |
|              | and [place]                                |
+--------------+--------------------------------------------+
| Organization | [facility]  |
|              | and [place]                                |
+--------------+--------------------------------------------+
| Address      | Unknown                                    |
+--------------+--------------------------------------------+
| Phone        | Unavailable                                |
+--------------+--------------------------------------------+
 
 
 
 Support
 
 
+----------------+--------------+---------------------+-----------------+
| Name           | Relationship | Address             | Phone           |
+----------------+--------------+---------------------+-----------------+
| Ada/Ed Radhames | ECON         | [place]              | [phone] |
|                |              | [place]  |                 |
|                |              |  [address]              |                 |
+----------------+--------------+---------------------+-----------------+
 
 
 
 
 Care Team Providers
 
 
+-----------------------+------+-------------+
| Care Team Member Name | Role | Phone       |
+-----------------------+------+-------------+
 PCP  | Unavailable |
+-----------------------+------+-------------+
 
 
 
 Reason for Visit
 
 
+-------------------+----------+
| Reason            | Comments |
+-------------------+----------+
| Medication Refill |          |
+-------------------+----------+
 
 
 
 Encounter Details
 
 
+--------+--------+---------------------+----------------------+-------------------+
| Date   | Type   | Department          | Care Team            | Description       |
+--------+--------+---------------------+----------------------+-------------------+
| / | Refill |   [facility]         |   Marzena Moser  | Medication Refill |
|    |        | NEPHROLOGY  [address]   | M, DO  [address]      |                   |
|        |        | [address]   | [address]      |                   |
|        |        | [place], [place]     | [place], [place]      |                   |
|        |        | [address]          | [address]  [phone]  |                   |
|        |        | [phone]        |  [phone] (Fax)  |                   |
+--------+--------+---------------------+----------------------+-------------------+
 
 
 
 Social History
 
 
+--------------+-------+-----------+--------+------+
| Tobacco Use  | Types | Packs/Day | Years  | Date |
|              |       |           | Used   |      |
+--------------+-------+-----------+--------+------+
| Never Smoker |       |           |        |      |
+--------------+-------+-----------+--------+------+
 
 
 
+---------------------+---+---+---+
| Smokeless Tobacco:  |   |   |   |
| Never Used          |   |   |   |
+---------------------+---+---+---+
 
 
 
+---------------------------------------------------------------+
| Comments: some second hand smoke exposure, but fairly minimal |
 
+---------------------------------------------------------------+
 
 
 
+-------------+-------------+---------+----------+
| Alcohol Use | Drinks/Week | oz/Week | Comments |
+-------------+-------------+---------+----------+
| No          |             |         |          |
+-------------+-------------+---------+----------+
 
 
 
+------------------+---------------+
| Sex Assigned at  | Date Recorded |
| Birth            |               |
+------------------+---------------+
| Not on file      |               |
+------------------+---------------+
 
 
 
+----------------+-------------+-------------+
| Job Start Date | Occupation  | Industry    |
+----------------+-------------+-------------+
| Not on file    | Not on file | Not on file |
+----------------+-------------+-------------+
 
 
 
+----------------+--------------+------------+
| Travel History | Travel Start | Travel End |
+----------------+--------------+------------+
 
 
 
+-------------------------------------+
| No recent travel history available. |
+-------------------------------------+
 documented as of this encounter
 
 Plan of Treatment
 
 
+--------+-----------+------------+----------------------+-------------------+
| Date   | Type      | Specialty  | Care Team            | Description       |
+--------+-----------+------------+----------------------+-------------------+
| / | Office    | Cardiology |   HellalfredoTonia,    |                   |
|    | Visit     |            | ARNP  [address]   |                   |
|        |           |            | St  [place], [place]  |                   |
|        |           |            | [address]  [phone]  |                   |
|        |           |            |  [phone] (Fax)  |                   |
+--------+-----------+------------+----------------------+-------------------+
| / | Hospital  | Radiology  |   Popeye Townsend, |                   |
|    | Encounter |            |  MD  [address] |                   |
|        |           |            |  St.  [place],   |                   |
|        |           |            | [place] [address]             |                   |
|        |           |            | [phone]         |                   |
|        |           |            | [phone] (Fax)   |                   |
+--------+-----------+------------+----------------------+-------------------+
| / | Surgery   | Radiology  |   Popeye Townsend, | CV EP PPM SYSTEM  |
 
|    |           |            |  MD  [address] | IMPLANT           |
|        |           |            |  St.  [place],   |                   |
|        |           |            | [place] [address]             |                   |
|        |           |            | [phone]         |                   |
|        |           |            | [phone] (Fax)   |                   |
+--------+-----------+------------+----------------------+-------------------+
| 02/10/ | Clinical  | Cardiology |                      |                   |
|    | Support   |            |                      |                   |
+--------+-----------+------------+----------------------+-------------------+
| / | Office    | Cardiology |   Tonia Wilson,    |                   |
|    | Visit     |            | ARNP  [address]   |                   |
|        |           |            | [place]  |                   |
|        |           |            | [address]  [phone]  |                   |
|        |           |            |  [phone] (Fax)  |                   |
+--------+-----------+------------+----------------------+-------------------+
| / | Off-Site  | Nephrology |   Marzena Moser  |                   |
|    | Visit     |            | DO ETIENNE  [address] [place] [address]      |                   |
|        |           |            | [address]      |                   |
|        |           |            | [place]      |                   |
|        |           |            | [address]  [phone]  |                   |
|        |           |            |  [phone] (Fax)  |                   |
+--------+-----------+------------+----------------------+-------------------+
 documented as of this encounter
 
 Visit Diagnoses
 Not on filedocumented in this encounter

## 2020-01-08 NOTE — XMS
Encounter Summary
  Created on: 2020
 
 Viviana Ricci
 External Reference #: [id number]
 : 47
 Sex: Female
 
 Demographics
 
 
+-----------------------+----------------------+
| Address               | [address]      |
|                       | [place]  [address] |
+-----------------------+----------------------+
| Home Phone            | [phone]      |
+-----------------------+----------------------+
| Preferred Language    | Unknown              |
+-----------------------+----------------------+
| Marital Status        | Single               |
+-----------------------+----------------------+
| Gnosticism Affiliation | [id number]                 |
+-----------------------+----------------------+
| Race                  | Unknown              |
+-----------------------+----------------------+
| Ethnic Group          | Unknown              |
+-----------------------+----------------------+
 
 
 Author
 
 
+--------------+--------------------------------------------+
| Author       | [facility]  |
|              | and [facility]                                |
+--------------+--------------------------------------------+
| Organization | [facility]  |
|              | and [facility]                                |
+--------------+--------------------------------------------+
| Address      | Unknown                                    |
+--------------+--------------------------------------------+
| Phone        | Unavailable                                |
+--------------+--------------------------------------------+
 
 
 
 Support
 
 
+----------------+--------------+---------------------+-----------------+
| Name           | Relationship | Address             | Phone           |
+----------------+--------------+---------------------+-----------------+
| Ada/Ed Radhames | ECON         | [address]              | [phone] |
|                |              | [place]  |                 |
|                |              |  [address]              |                 |
+----------------+--------------+---------------------+-----------------+
 
 
 
 
 Care Team Providers
 
 
+-----------------------+------+-------------+
| Care Team Member Name | Role | Phone       |
+-----------------------+------+-------------+
 PCP  | Unavailable |
+-----------------------+------+-------------+
 
 
 
 Encounter Details
 
 
+--------+----------+---------------------+----------------------+-------------+
| Date   | Type     | Department          | Care Team            | Description |
+--------+----------+---------------------+----------------------+-------------+
| / | Abstract |   [facility]         |   Marzena Moser  |             |
|    |          | NEPHROLOGY  [address]   | M, DO  [address]      |             |
|        |          | [address]   | [address]      |             |
|        |          | [place], [place]     | [place]      |             |
|        |          | [address]          | [address]  [phone]  |             |
|        |          | [phone]        |  [phone] (Fax)  |             |
+--------+----------+---------------------+----------------------+-------------+
 
 
 
 Social History
 
 
+--------------+-------+-----------+--------+------+
| Tobacco Use  | Types | Packs/Day | Years  | Date |
|              |       |           | Used   |      |
+--------------+-------+-----------+--------+------+
| Never Smoker |       |           |        |      |
+--------------+-------+-----------+--------+------+
 
 
 
+---------------------+---+---+---+
| Smokeless Tobacco:  |   |   |   |
| Never Used          |   |   |   |
+---------------------+---+---+---+
 
 
 
+---------------------------------------------------------------+
| Comments: some second hand smoke exposure, but fairly minimal |
+---------------------------------------------------------------+
 
 
 
+-------------+-------------+---------+----------+
| Alcohol Use | Drinks/Week | oz/Week | Comments |
+-------------+-------------+---------+----------+
| No          |             |         |          |
+-------------+-------------+---------+----------+
 
 
 
 
+------------------+---------------+
| Sex Assigned at  | Date Recorded |
| Birth            |               |
+------------------+---------------+
| Not on file      |               |
+------------------+---------------+
 
 
 
+----------------+-------------+-------------+
| Job Start Date | Occupation  | Industry    |
+----------------+-------------+-------------+
| Not on file    | Not on file | Not on file |
+----------------+-------------+-------------+
 
 
 
+----------------+--------------+------------+
| Travel History | Travel Start | Travel End |
+----------------+--------------+------------+
 
 
 
+-------------------------------------+
| No recent travel history available. |
+-------------------------------------+
 documented as of this encounter
 
 Plan of Treatment
 
 
+--------+-----------+------------+----------------------+-------------------+
| Date   | Type      | Specialty  | Care Team            | Description       |
+--------+-----------+------------+----------------------+-------------------+
| / | Office    | Cardiology |   Tonia Wilson,    |                   |
|    | Visit     |            | ARNJESSICA  [address]   |                   |
|        |           |            | [place]  |                   |
|        |           |            | [address]  [phone]  |                   |
|        |           |            |  [phone] (Fax)  |                   |
+--------+-----------+------------+----------------------+-------------------+
| / | Hospital  | Radiology  |   Popeye Townsend, |                   |
|    | Encounter |            |  MD  [address] |                   |
|        |           |            |  St.  [place],   |                   |
|        |           |            | [place] [address]             |                   |
|        |           |            | [phone]         |                   |
|        |           |            | [phone] (Fax)   |                   |
+--------+-----------+------------+----------------------+-------------------+
| / | Surgery   | Radiology  |   Popeye Townsend, | CV EP PPM SYSTEM  |
|    |           |            |  MD  [address] | IMPLANT           |
|        |           |            |  St.  [place],   |                   |
|        |           |            | [place] [address]             |                   |
|        |           |            | [phone]         |                   |
|        |           |            | [phone] (Fax)   |                   |
+--------+-----------+------------+----------------------+-------------------+
| 02/10/ | Clinical  | Cardiology |                      |                   |
|    | Support   |            |                      |                   |
+--------+-----------+------------+----------------------+-------------------+
| / | Office    | Cardiology |   Tonia Wilson,    |                   |
|    | Visit     |            | ARNP  [address]   |                   |
 
|        |           |            | St  [place], [place]  |                   |
|        |           |            | [address]  [phone]  |                   |
|        |           |            |  [phone] (Fax)  |                   |
+--------+-----------+------------+----------------------+-------------------+
| / | Off-Site  | Nephrology |   Marzena Moser  |                   |
| 2020   | Visit     |            | DO ETIENNE  [address] [place] [address]      |                   |
|        |           |            | [address]      |                   |
|        |           |            | [place]      |                   |
|        |           |            | [address]  [phone]  |                   |
|        |           |            |  [phone] (Fax)  |                   |
+--------+-----------+------------+----------------------+-------------------+
 documented as of this encounter
 
 Procedures
 
 
+----------------------+--------+------------+----------------------+----------------------+
| Procedure Name       | Priori | Date/Time  | Associated Diagnosis | Comments             |
|                      | ty     |            |                      |                      |
+----------------------+--------+------------+----------------------+----------------------+
| EXTERNAL LAB:        | Routin | 2016 |                      |   Results for this   |
| TACROLIMUS LEVEL,    | e      |            |                      | procedure are in the |
| CMIA                 |        |            |                      |  results section.    |
+----------------------+--------+------------+----------------------+----------------------+
| EXTERNAL LAB: BK     | Routin | 2016 |                      |   Results for this   |
| VIRUS, NAAT, SERUM,  | e      |            |                      | procedure are in the |
| QUANT                |        |            |                      |  results section.    |
+----------------------+--------+------------+----------------------+----------------------+
 documented in this encounter
 
 Results
 External Lab: Bk Virus, NAAT Serum, Quant (2016)
 
+-------------+--------------+-----------+------------+--------------+
| Component   | Value        | Ref Range | Performed  | Pathologist  |
|             |              |           | At         | Signature    |
+-------------+--------------+-----------+------------+--------------+
| BKV, Serum, | not detected |           |            |              |
|  PCR,       |              |           |            |              |
| External    |              |           |            |              |
+-------------+--------------+-----------+------------+--------------+
 
 
 
+----------+
| Specimen |
+----------+
|          |
+----------+
 External Lab: Tacrolimus Level, CMIA (2016)
 
+-------------+-------+-----------+------------+--------------+
| Component   | Value | Ref Range | Performed  | Pathologist  |
|             |       |           | At         | Signature    |
+-------------+-------+-----------+------------+--------------+
| Tacrolimus, | 5.2   |           |            |              |
|  CMIA,      |       |           |            |              |
| External    |       |           |            |              |
+-------------+-------+-----------+------------+--------------+
 
 
 
 
+----------+
| Specimen |
+----------+
|          |
+----------+
 documented in this encounter
 
 Visit Diagnoses
 Not on filedocumented in this encounter

## 2020-01-08 NOTE — XMS
Encounter Summary
  Created on: 2020
 
 Viviana Ricci
 External Reference #: [id number]
 : 47
 Sex: Female
 
 Demographics
 
 
+-----------------------+----------------------+
| Address               | [address]      |
|                       | [place]  [address] |
+-----------------------+----------------------+
| Home Phone            | [phone]      |
+-----------------------+----------------------+
| Preferred Language    | Unknown              |
+-----------------------+----------------------+
| Marital Status        | Single               |
+-----------------------+----------------------+
| Gnosticist Affiliation | 1009                 |
+-----------------------+----------------------+
| Race                  | Unknown              |
+-----------------------+----------------------+
| Ethnic Group          | Unknown              |
+-----------------------+----------------------+
 
 
 Author
 
 
+--------------+--------------------------------------------+
| Author       | [facility]  |
|              | and [place]                                |
+--------------+--------------------------------------------+
| Organization | [facility]  |
|              | and [facility]                                |
+--------------+--------------------------------------------+
| Address      | Unknown                                    |
+--------------+--------------------------------------------+
| Phone        | Unavailable                                |
+--------------+--------------------------------------------+
 
 
 
 Support
 
 
+----------------+--------------+---------------------+-----------------+
| Name           | Relationship | Address             | Phone           |
+----------------+--------------+---------------------+-----------------+
| Ada/Ed Radhames | ECON         | [address]              | [phone] |
|                |              | [place]  |                 |
|                |              |  [address]              |                 |
+----------------+--------------+---------------------+-----------------+
 
 
 
 
 Care Team Providers
 
 
+-----------------------+------+-------------+
| Care Team Member Name | Role | Phone       |
+-----------------------+------+-------------+
 PCP  | Unavailable |
+-----------------------+------+-------------+
 
 
 
 Reason for Visit
 
 
+-------------------+----------+
| Reason            | Comments |
+-------------------+----------+
| Medication Refill |          |
+-------------------+----------+
 
 
 
 Encounter Details
 
 
+--------+--------+---------------------+----------------------+-------------------+
| Date   | Type   | Department          | Care Team            | Description       |
+--------+--------+---------------------+----------------------+-------------------+
| / | Refill |   [place]         |   Marzena Moser  | Medication Refill |
| 2017   |        | NEPHROLOGY  [address]   | M, DO  [address]      |                   |
|        |        | [address]   | [address]      |                   |
|        |        | [place], [place]     | [place], [place]      |                   |
|        |        | [address]          | [address]  [phone]  |                   |
|        |        | [phone]        |  [phone] (Fax)  |                   |
+--------+--------+---------------------+----------------------+-------------------+
 
 
 
 Social History
 
 
+--------------+-------+-----------+--------+------+
| Tobacco Use  | Types | Packs/Day | Years  | Date |
|              |       |           | Used   |      |
+--------------+-------+-----------+--------+------+
| Never Smoker |       |           |        |      |
+--------------+-------+-----------+--------+------+
 
 
 
+---------------------+---+---+---+
| Smokeless Tobacco:  |   |   |   |
| Never Used          |   |   |   |
+---------------------+---+---+---+
 
 
 
+---------------------------------------------------------------+
| Comments: some second hand smoke exposure, but fairly minimal |
 
+---------------------------------------------------------------+
 
 
 
+-------------+-------------+---------+----------+
| Alcohol Use | Drinks/Week | oz/Week | Comments |
+-------------+-------------+---------+----------+
| No          |             |         |          |
+-------------+-------------+---------+----------+
 
 
 
+------------------+---------------+
| Sex Assigned at  | Date Recorded |
| Birth            |               |
+------------------+---------------+
| Not on file      |               |
+------------------+---------------+
 
 
 
+----------------+-------------+-------------+
| Job Start Date | Occupation  | Industry    |
+----------------+-------------+-------------+
| Not on file    | Not on file | Not on file |
+----------------+-------------+-------------+
 
 
 
+----------------+--------------+------------+
| Travel History | Travel Start | Travel End |
+----------------+--------------+------------+
 
 
 
+-------------------------------------+
| No recent travel history available. |
+-------------------------------------+
 documented as of this encounter
 
 Plan of Treatment
 
 
+--------+-----------+------------+----------------------+-------------------+
| Date   | Type      | Specialty  | Care Team            | Description       |
+--------+-----------+------------+----------------------+-------------------+
| / | Office    | Cardiology |   HellalfredoTonia,    |                   |
|    | Visit     |            | ARNP  [address]   |                   |
|        |           |            | St  [place], [place]  |                   |
|        |           |            | [address]  [phone]  |                   |
|        |           |            |  [phone] (Fax)  |                   |
+--------+-----------+------------+----------------------+-------------------+
| / | Hospital  | Radiology  |   Popeye Townsend, |                   |
|    | Encounter |            |  MD  [address] |                   |
|        |           |            |  St.  [place],   |                   |
|        |           |            | [place] [address]             |                   |
|        |           |            | [phone]         |                   |
|        |           |            | [phone] (Fax)   |                   |
+--------+-----------+------------+----------------------+-------------------+
| / | Surgery   | Radiology  |   Popeye Townsend, | CV EP PPM SYSTEM  |
 
|    |           |            |  MD  [address] | IMPLANT           |
|        |           |            |  St.  [place],   |                   |
|        |           |            | [place] [address]             |                   |
|        |           |            | [phone]         |                   |
|        |           |            | [phone] (Fax)   |                   |
+--------+-----------+------------+----------------------+-------------------+
| 02/10/ | Clinical  | Cardiology |                      |                   |
|    | Support   |            |                      |                   |
+--------+-----------+------------+----------------------+-------------------+
| / | Office    | Cardiology |   Tonia Wilson,    |                   |
|    | Visit     |            | ARNP  [address]   |                   |
|        |           |            | [place]  |                   |
|        |           |            | [address]  [phone]  |                   |
|        |           |            |  [phone] (Fax)  |                   |
+--------+-----------+------------+----------------------+-------------------+
| / | Off-Site  | Nephrology |   Marzena Moser  |                   |
|    | Visit     |            | DO ETIENNE  [address] [place] [address]      |                   |
|        |           |            | [address]      |                   |
|        |           |            | [place]      |                   |
|        |           |            | [address]  [phone]  |                   |
|        |           |            |  [phone] (Fax)  |                   |
+--------+-----------+------------+----------------------+-------------------+
 documented as of this encounter
 
 Visit Diagnoses
 Not on filedocumented in this encounter

## 2020-01-08 NOTE — XMS
Encounter Summary
  Created on: 2020
 
 Viviana Ricci
 External Reference #: [id number]
 : 47
 Sex: Female
 
 Demographics
 
 
+-----------------------+----------------------+
| Address               | [address]      |
|                       | [place]  [address] |
+-----------------------+----------------------+
| Home Phone            | [phone]      |
+-----------------------+----------------------+
| Preferred Language    | Unknown              |
+-----------------------+----------------------+
| Marital Status        | Single               |
+-----------------------+----------------------+
| Jewish Affiliation | [id number]                 |
+-----------------------+----------------------+
| Race                  | Unknown              |
+-----------------------+----------------------+
| Ethnic Group          | Unknown              |
+-----------------------+----------------------+
 
 
 Author
 
 
+--------------+--------------------------------------------+
| Author       | [facility]  |
|              | and [facility]                                |
+--------------+--------------------------------------------+
| Organization | [facility]  |
|              | and [facility]                                |
+--------------+--------------------------------------------+
| Address      | Unknown                                    |
+--------------+--------------------------------------------+
| Phone        | Unavailable                                |
+--------------+--------------------------------------------+
 
 
 
 Support
 
 
+----------------+--------------+---------------------+-----------------+
| Name           | Relationship | Address             | Phone           |
+----------------+--------------+---------------------+-----------------+
| Ada/Ed Radhames | ECON         | [address]              | [phone] |
|                |              | [place]  |                 |
|                |              |  [address]              |                 |
+----------------+--------------+---------------------+-----------------+
 
 
 
 
 Care Team Providers
 
 
+-----------------------+------+-------------+
| Care Team Member Name | Role | Phone       |
+-----------------------+------+-------------+
 PCP  | Unavailable |
+-----------------------+------+-------------+
 
 
 
 Reason for Visit
 
 
+-------------------+----------+
| Reason            | Comments |
+-------------------+----------+
| Medication Refill |          |
+-------------------+----------+
 
 
 
 Encounter Details
 
 
+--------+--------+---------------------+----------------------+-------------------+
| Date   | Type   | Department          | Care Team            | Description       |
+--------+--------+---------------------+----------------------+-------------------+
| / | Refill |   [facility]         |   Marzena Moser  | Medication Refill |
|    |        | NEPHROLOGY  [address]   | M, DO  [address]      |                   |
|        |        | [address]   | [address]      |                   |
|        |        | [place], [place]     | [place], [place]      |                   |
|        |        | [address]          | [address]  [phone]  |                   |
|        |        | [phone]        |  [phone] (Fax)  |                   |
+--------+--------+---------------------+----------------------+-------------------+
 
 
 
 Social History
 
 
+--------------+-------+-----------+--------+------+
| Tobacco Use  | Types | Packs/Day | Years  | Date |
|              |       |           | Used   |      |
+--------------+-------+-----------+--------+------+
| Never Smoker |       |           |        |      |
+--------------+-------+-----------+--------+------+
 
 
 
+---------------------+---+---+---+
| Smokeless Tobacco:  |   |   |   |
| Never Used          |   |   |   |
+---------------------+---+---+---+
 
 
 
+---------------------------------------------------------------+
| Comments: some second hand smoke exposure, but fairly minimal |
 
+---------------------------------------------------------------+
 
 
 
+-------------+-------------+---------+----------+
| Alcohol Use | Drinks/Week | oz/Week | Comments |
+-------------+-------------+---------+----------+
| No          |             |         |          |
+-------------+-------------+---------+----------+
 
 
 
+------------------+---------------+
| Sex Assigned at  | Date Recorded |
| Birth            |               |
+------------------+---------------+
| Not on file      |               |
+------------------+---------------+
 
 
 
+----------------+-------------+-------------+
| Job Start Date | Occupation  | Industry    |
+----------------+-------------+-------------+
| Not on file    | Not on file | Not on file |
+----------------+-------------+-------------+
 
 
 
+----------------+--------------+------------+
| Travel History | Travel Start | Travel End |
+----------------+--------------+------------+
 
 
 
+-------------------------------------+
| No recent travel history available. |
+-------------------------------------+
 documented as of this encounter
 
 Plan of Treatment
 
 
+--------+-----------+------------+----------------------+-------------------+
| Date   | Type      | Specialty  | Care Team            | Description       |
+--------+-----------+------------+----------------------+-------------------+
| / | Office    | Cardiology |   HelllafredoTonia,    |                   |
|    | Visit     |            | ARNP  [address]   |                   |
|        |           |            | St  [place], [place]  |                   |
|        |           |            | [address]  [phone]  |                   |
|        |           |            |  [phone] (Fax)  |                   |
+--------+-----------+------------+----------------------+-------------------+
| / | Hospital  | Radiology  |   Popeye Townsend, |                   |
|    | Encounter |            |  MD  [address] |                   |
|        |           |            |  St.  [place],   |                   |
|        |           |            | [place] [address]             |                   |
|        |           |            | [phone]         |                   |
|        |           |            | [phone] (Fax)   |                   |
+--------+-----------+------------+----------------------+-------------------+
| / | Surgery   | Radiology  |   Popeye Townsend, | CV EP PPM SYSTEM  |
 
|    |           |            |  MD  [address] | IMPLANT           |
|        |           |            |  St.  [place],   |                   |
|        |           |            | [place] [address]             |                   |
|        |           |            | [phone]         |                   |
|        |           |            | [phone] (Fax)   |                   |
+--------+-----------+------------+----------------------+-------------------+
| 02/10/ | Clinical  | Cardiology |                      |                   |
|    | Support   |            |                      |                   |
+--------+-----------+------------+----------------------+-------------------+
| / | Office    | Cardiology |   Tonia Wilson,    |                   |
|    | Visit     |            | ARNP  [address]   |                   |
|        |           |            | [place]  |                   |
|        |           |            | [address]  [phone]  |                   |
|        |           |            |  [phone] (Fax)  |                   |
+--------+-----------+------------+----------------------+-------------------+
| / | Off-Site  | Nephrology |   Marzena Moser  |                   |
|    | Visit     |            | DO ETIENNE  [address] [place] [address]      |                   |
|        |           |            | [address]      |                   |
|        |           |            | [place]      |                   |
|        |           |            | [address]  [phone]  |                   |
|        |           |            |  [phone] (Fax)  |                   |
+--------+-----------+------------+----------------------+-------------------+
 documented as of this encounter
 
 Visit Diagnoses
 Not on filedocumented in this encounter

## 2020-01-08 NOTE — XMS
Encounter Summary
  Created on: 2020
 
 Viviana Ricci
 External Reference #: [id number]
 : 47
 Sex: Female
 
 Demographics
 
 
+-----------------------+----------------------+
| Address               | [address]      |
|                       | [place]  [address] |
+-----------------------+----------------------+
| Home Phone            | [phone]      |
+-----------------------+----------------------+
| Preferred Language    | Unknown              |
+-----------------------+----------------------+
| Marital Status        | Single               |
+-----------------------+----------------------+
| Mormonism Affiliation | [id number]                 |
+-----------------------+----------------------+
| Race                  | Unknown              |
+-----------------------+----------------------+
| Ethnic Group          | Unknown              |
+-----------------------+----------------------+
 
 
 Author
 
 
+--------------+--------------------------------------------+
| Author       | [facility]  |
|              | and [facility]                                |
+--------------+--------------------------------------------+
| Organization | [facility]  |
|              | and [facility]                                |
+--------------+--------------------------------------------+
| Address      | Unknown                                    |
+--------------+--------------------------------------------+
| Phone        | Unavailable                                |
+--------------+--------------------------------------------+
 
 
 
 Support
 
 
+----------------+--------------+---------------------+-----------------+
| Name           | Relationship | Address             | Phone           |
+----------------+--------------+---------------------+-----------------+
| Ada/Ed Radhames | ECON         | [address]              | [phone] |
|                |              | [place]  |                 |
|                |              |  [id number]              |                 |
+----------------+--------------+---------------------+-----------------+
 
 
 
 
 Care Team Providers
 
 
+------------------------+------+-----------------+
| Care Team Member Name  | Role | Phone           |
+------------------------+------+-----------------+
| Marzena Moser PCP  | [phone] |
+------------------------+------+-----------------+
 
 
 
 Reason for Referral
 Diagnostic/Screening (Routine)
 
+--------+--------+-----------+--------------+--------------+---------------+
| Status | Reason | Specialty | Diagnoses /  | Referred By  | Referred To   |
|        |        |           | Procedures   | Contact      | Contact       |
+--------+--------+-----------+--------------+--------------+---------------+
| Closed |        | Radiology |   Diagnoses  |   [facility]  |   [facility] |
|        |        |           |  Coronary    | Tonia, ARNP   |  Medicine     |
|        |        |           | artery       | [address] | [address]  |
|        |        |           | disease      |  St  [place]   |  [place], |
|        |        |           | involving    | [place]    |  [place]           |
|        |        |           | native       | [address]        | [address]    |
|        |        |           | coronary     | Phone:       | Phone:        |
|        |        |           | artery of    | [phone] | [phone]  |
|        |        |           | native heart |   Fax:       |  Fax:         |
|        |        |           |  without     | [phone] | [phone]  |
|        |        |           | angina       |              |               |
|        |        |           | pectoris     |              |               |
|        |        |           | Hypertension |              |               |
|        |        |           | , essential  |              |               |
|        |        |           |  Mixed       |              |               |
|        |        |           | hyperlipidem |              |               |
|        |        |           | ia           |              |               |
|        |        |           | Procedures   |              |               |
|        |        |           | NM Nuclear   |              |               |
|        |        |           | Stress Test  |              |               |
|        |        |           | (Vasodilator |              |               |
|        |        |           | )            |              |               |
+--------+--------+-----------+--------------+--------------+---------------+
 
 
 
 
 Encounter Details
 
 
+--------+-----------+----------------------+----------------------+----------------------+
| Date   | Type      | Department           | Care Team            | Description          |
+--------+-----------+----------------------+----------------------+----------------------+
| 10/30/ | Hospital  |   [facility] |   Tonia Wilson,    | Coronary artery      |
| 2019   | Encounter |  [facility]     | ARNP  [address]   | disease involving    |
|        |           | MEDICINE  [address]      | [address]  [place]  | native coronary      |
|        |           | [address]  [place], | [address]  [phone]  | artery of native     |
|        |           |  [place] [address]       |  [phone] (Fax)  | heart without angina |
|        |           | [phone]         |  Cardi[place]   |  pectoris;           |
|        |           |                      |                      | Hypertension,        |
|        |           |                      |                      | essential; Mixed     |
 
|        |           |                      |                      | hyperlipidemia       |
+--------+-----------+----------------------+----------------------+----------------------+
 
 
 
 Social History
 
 
+--------------+-------+-----------+--------+------+
| Tobacco Use  | Types | Packs/Day | Years  | Date |
|              |       |           | Used   |      |
+--------------+-------+-----------+--------+------+
| Never Smoker |       |           |        |      |
+--------------+-------+-----------+--------+------+
 
 
 
+---------------------+---+---+---+
| Smokeless Tobacco:  |   |   |   |
| Never Used          |   |   |   |
+---------------------+---+---+---+
 
 
 
+---------------------------------------------------------------+
| Comments: some second hand smoke exposure, but fairly minimal |
+---------------------------------------------------------------+
 
 
 
+-------------+-------------+---------+----------+
| Alcohol Use | Drinks/Week | oz/Week | Comments |
+-------------+-------------+---------+----------+
| No          |             |         |          |
+-------------+-------------+---------+----------+
 
 
 
+------------------+---------------+
| Sex Assigned at  | Date Recorded |
| Birth            |               |
+------------------+---------------+
| Not on file      |               |
+------------------+---------------+
 
 
 
+----------------+-------------+-------------+
| Job Start Date | Occupation  | Industry    |
+----------------+-------------+-------------+
| Not on file    | Not on file | Not on file |
+----------------+-------------+-------------+
 
 
 
+----------------+--------------+------------+
| Travel History | Travel Start | Travel End |
+----------------+--------------+------------+
 
 
 
 
+-------------------------------------+
| No recent travel history available. |
+-------------------------------------+
 documented as of this encounter
 
 Last Filed Vital Signs
 
 
+-------------------+-------------------+----------------------+----------+
| Vital Sign        | Reading           | Time Taken           | Comments |
+-------------------+-------------------+----------------------+----------+
| Blood Pressure    | -                 | -                    |          |
+-------------------+-------------------+----------------------+----------+
| Pulse             | -                 | -                    |          |
+-------------------+-------------------+----------------------+----------+
| Temperature       | -                 | -                    |          |
+-------------------+-------------------+----------------------+----------+
| Respiratory Rate  | -                 | -                    |          |
+-------------------+-------------------+----------------------+----------+
| Oxygen Saturation | -                 | -                    |          |
+-------------------+-------------------+----------------------+----------+
| Inhaled Oxygen    | -                 | -                    |          |
| Concentration     |                   |                      |          |
+-------------------+-------------------+----------------------+----------+
| Weight            | 118.8 kg (262 lb) | 10/30/2019 10:00 AM  |          |
|                   |                   | PDT                  |          |
+-------------------+-------------------+----------------------+----------+
| Height            | -                 | -                    |          |
+-------------------+-------------------+----------------------+----------+
| Body Mass Index   | 42.29             | 10/10/2019  1:25 PM  |          |
|                   |                   | PDT                  |          |
+-------------------+-------------------+----------------------+----------+
 documented in this encounter
 
 Medications at Time of Discharge
 
 
+----------------------+----------------------+-----------+---------+----------+-----------+
| Medication           | Sig                  | Dispensed | Refills | Start    | End Date  |
|                      |                      |           |         | Date     |           |
+----------------------+----------------------+-----------+---------+----------+-----------+
|   allopurinol        | take 1 tablet by     |   30      | 11      | 20 |           |
| (ZYLOPRIM) 100 mg    | mouth once daily     | tablet    |         | 18       |           |
| tablet               |                      |           |         |          |           |
+----------------------+----------------------+-----------+---------+----------+-----------+
|   aspirin 81 mg EC   | Take 81 mg by mouth  |           | 0       |          |           |
| tablet               | Daily.               |           |         |          |           |
+----------------------+----------------------+-----------+---------+----------+-----------+
|   B-D INS SYRINGE    | USE BEFORE MEALS AS  |   1 each  | 11      | 20 |           |
| 0.5CC/31GX5/16 31G X | DIRECTED             |           |         | 18       |           |
|  5/16" 0.5 ML MISC   |                      |           |         |          |           |
+----------------------+----------------------+-----------+---------+----------+-----------+
|   cholecalciferol    | Take 2,000 Units by  |           | 0       |          |           |
| (VITAMIN D-3) 2000   | mouth Every other    |           |         |          |           |
| UNITS                | day.                 |           |         |          |           |
| TABSIndications:     |                      |           |         |          |           |
| Unspecified          |                      |           |         |          |           |
| hypertensive kidney  |                      |           |         |          |           |
| disease with chronic |                      |           |         |          |           |
 
|  kidney disease      |                      |           |         |          |           |
| stage I through      |                      |           |         |          |           |
| stage IV, or         |                      |           |         |          |           |
| unspecified(403.90), |                      |           |         |          |           |
|  FSGS (focal         |                      |           |         |          |           |
| segmental            |                      |           |         |          |           |
| glomerulosclerosis), |                      |           |         |          |           |
|  Hyperlipidemia,     |                      |           |         |          |           |
| Complications of     |                      |           |         |          |           |
| transplanted kidney  |                      |           |         |          |           |
+----------------------+----------------------+-----------+---------+----------+-----------+
|   cinacalcet         | take 1 tablet by     |   90      | 3       | 20 |           |
| (SENSIPAR) 30 mg     | mouth once daily     | tablet    |         | 19       |           |
| tablet               |                      |           |         |          |           |
+----------------------+----------------------+-----------+---------+----------+-----------+
|   cyclobenzaprine    | Take 1 tablet by     |   45      | 0       | 20 |           |
| (FLEXERIL) 10 mg     | mouth Twice  daily   | tablet    |         | 19       |           |
| tablet               | as needed for Muscle |           |         |          |           |
|                      |  spasms.             |           |         |          |           |
+----------------------+----------------------+-----------+---------+----------+-----------+
|   fludrocortisone    | Take 1 tablet by     |   45      | 3       | 20 |           |
| (FLORINEF) 0.1 mg    | mouth Every other    | tablet    |         | 19       |           |
| tablet               | day.                 |           |         |          |           |
+----------------------+----------------------+-----------+---------+----------+-----------+
|   furosemide (LASIX) | Take 1 tablet by     |   30      | 11      | 20 |           |
|  40 mg               | mouth Daily as       | tablet    |         | 18       |           |
| tabletIndications:   | needed.              |           |         |          |           |
| Edema, unspecified   |                      |           |         |          |           |
| type, FSGS (focal    |                      |           |         |          |           |
| segmental            |                      |           |         |          |           |
| glomerulosclerosis), |                      |           |         |          |           |
|  Hyperlipidemia      |                      |           |         |          |           |
+----------------------+----------------------+-----------+---------+----------+-----------+
|   glucose blood VI   | Check blood sugar    |   100     | 12      | 20 |           |
| test strips (ONE     | before each meal and | each      |         | 12       |           |
| TOUCH ULTRA TEST)    |  as directed         |           |         |          |           |
| stripIndications:    |                      |           |         |          |           |
| Unspecified          |                      |           |         |          |           |
| hypertensive kidney  |                      |           |         |          |           |
| disease with chronic |                      |           |         |          |           |
|  kidney disease      |                      |           |         |          |           |
| stage I through      |                      |           |         |          |           |
| stage IV, or         |                      |           |         |          |           |
| unspecified(403.90), |                      |           |         |          |           |
|  Complications of    |                      |           |         |          |           |
| transplanted kidney, |                      |           |         |          |           |
|  Diabetes mellitus   |                      |           |         |          |           |
| type II,             |                      |           |         |          |           |
| uncontrolled (HCC),  |                      |           |         |          |           |
| Hyperlipidemia       |                      |           |         |          |           |
+----------------------+----------------------+-----------+---------+----------+-----------+
|   insulin glargine   | inject 20 units      |   10 vial | 11      | 20 |           |
| (LANTUS) 100         | subcutaneously every |           |         | 18       |           |
| units/mL injection   |  morning             |           |         |          |           |
| (vial)Indications:   |                      |           |         |          |           |
| Type 2 diabetes      |                      |           |         |          |           |
| mellitus with        |                      |           |         |          |           |
| chronic kidney       |                      |           |         |          |           |
| disease, without     |                      |           |         |          |           |
| long-term current    |                      |           |         |          |           |
 
| use of insulin,      |                      |           |         |          |           |
| unspecified CKD      |                      |           |         |          |           |
| stage ([facility]), Kidney  |                      |           |         |          |           |
| replaced by          |                      |           |         |          |           |
| transplant           |                      |           |         |          |           |
+----------------------+----------------------+-----------+---------+----------+-----------+
|   insulin lispro     | inject               |   10 vial | 11      | 11/15/20 |           |
| (HUMALOG) 100        | subcutaneously       |           |         | 17       |           |
| units/mL injection   | BEFORE MEALS         |           |         |          |           |
| (vial)Indications:   | ACCORDING TO SLIDING |           |         |          |           |
| Type 2 diabetes      |  SCALE Max dose 20   |           |         |          |           |
| mellitus with        | units daily          |           |         |          |           |
| complication, with   |                      |           |         |          |           |
| long-term current    |                      |           |         |          |           |
| use of insulin ([facility]) |                      |           |         |          |           |
+----------------------+----------------------+-----------+---------+----------+-----------+
|   levothyroxine      | take 1 tablet by     |   30      | 11      | 20 |           |
| (SYNTHROID) 50 mcg   | mouth once daily     | tablet    |         | 18       |           |
| tablet               |                      |           |         |          |           |
+----------------------+----------------------+-----------+---------+----------+-----------+
|   lisinopril         | take 1 tablet by     |   90      | 3       | 20 |           |
| (PRINIVIL,ZESTRIL)   | mouth once daily     | tablet    |         | 17       |           |
| 30 MG tablet         |                      |           |         |          |           |
+----------------------+----------------------+-----------+---------+----------+-----------+
|   loperamide         | Take 1 capsule by    |   60      | 5       | 20 |           |
| (ANTI-DIARRHEAL) 2   | mouth 4 times daily  | capsule   |         | 14       |           |
| mg                   | as needed.           |           |         |          |           |
| capsuleIndications:  |                      |           |         |          |           |
| Unspecified          |                      |           |         |          |           |
| hypertensive kidney  |                      |           |         |          |           |
| disease with chronic |                      |           |         |          |           |
|  kidney disease      |                      |           |         |          |           |
| stage I through      |                      |           |         |          |           |
| stage IV, or         |                      |           |         |          |           |
| unspecified(403.90), |                      |           |         |          |           |
|  FSGS (focal         |                      |           |         |          |           |
| segmental            |                      |           |         |          |           |
| glomerulosclerosis), |                      |           |         |          |           |
|  Hypothyroidism,     |                      |           |         |          |           |
| Hyperlipidemia       |                      |           |         |          |           |
+----------------------+----------------------+-----------+---------+----------+-----------+
|   losartan (COZAAR)  | take 1 tablet by     |   90      | 3       | 20 |           |
| 50 mg tablet         | mouth once daily     | tablet    |         | 18       |           |
+----------------------+----------------------+-----------+---------+----------+-----------+
|   magnesium oxide    | take 1 tablet by     |   60      | 11      | 10/02/20 |           |
| (MAG-OX) 400 mg      | mouth twice a day    | tablet    |         | 18       |           |
| tablet               |                      |           |         |          |           |
+----------------------+----------------------+-----------+---------+----------+-----------+
|   predniSONE         | take 1 tablet by     |   90      | 3       | 10/02/20 |           |
| (DELTASONE) 5 mg     | mouth once daily     | tablet    |         | 18       |           |
| tablet               |                      |           |         |          |           |
+----------------------+----------------------+-----------+---------+----------+-----------+
|   Prenatal Vit-Fe    | take 1 tablet by     |   30      | 11      | 20 |           |
| Fumarate-FA (PNV     | mouth once daily.    | tablet    |         | 18       |           |
| PRENATAL PLUS        |                      |           |         |          |           |
| MULTIVITAMIN) 27-1   |                      |           |         |          |           |
| MG TABS              |                      |           |         |          |           |
+----------------------+----------------------+-----------+---------+----------+-----------+
|   Respiratory        | Continue nocturnal   |   1 each  | 0       | 20 |           |
| Therapy Supplies     | O2 at 2 l/m through  |           |         | 18       |           |
 
| MISCIndications: JACK | CPAP for lifetime.   |           |         |          |           |
|  (obstructive sleep  | Dx: JACK G47.33       |           |         |          |           |
| apnea)               | Please provide new   |           |         |          |           |
|                      | nasal mask for CPAP  |           |         |          |           |
|                      | and any other needed |           |         |          |           |
|                      |  replacement         |           |         |          |           |
|                      | supplies.            |           |         |          |           |
+----------------------+----------------------+-----------+---------+----------+-----------+
|   Respiratory        | Decrease CPAP to     |   1 each  | 0       | 20 |           |
| Therapy Supplies     | 12-18 cmH2O          |           |         | 13       |           |
| MISC                 | Diagnosis            |           |         |          |           |
|                      | Code(s)327.23.       |           |         |          |           |
|                      | Please send order to |           |         |          |           |
|                      |  [facility].     |           |         |          |           |
+----------------------+----------------------+-----------+---------+----------+-----------+
|   rosuvastatin       | take 1 tablet by     |   30      | 11      | 20 |           |
| (CRESTOR) 20 mg      | mouth NIGHTLY        | tablet    |         | 18       |           |
| tablet               |                      |           |         |          |           |
+----------------------+----------------------+-----------+---------+----------+-----------+
|   fludrocortisone    | Take 1 tablet by     |           | 0       |          |  |
| (FLORINEF) 0.1 mg    | mouth Every other    |           |         |          | 9         |
| tablet               | day.                 |           |         |          |           |
+----------------------+----------------------+-----------+---------+----------+-----------+
|   metoprolol         | take 1 tablet by     |   60      | 11      | 20 |  |
| tartrate (LOPRESSOR) | mouth twice a day    | tablet    |         | 19       | 9         |
|  25 mg tablet        |                      |           |         |          |           |
+----------------------+----------------------+-----------+---------+----------+-----------+
|   mycophenolate      | Take 1 capsule by    |   60      | 11      | 20 |  |
| (CELLCEPT) 250 mg    | mouth 2 times daily. | capsule   |         | 18       | 9         |
| capsuleIndications:  |                      |           |         |          |           |
| Kidney replaced by   |                      |           |         |          |           |
| transplant           |                      |           |         |          |           |
+----------------------+----------------------+-----------+---------+----------+-----------+
|   QVAR REDIHALER 80  |                      |           | 0       | 20 |  |
| MCG/ACT inhaler      |                      |           |         | 18       | 9         |
+----------------------+----------------------+-----------+---------+----------+-----------+
|   tacrolimus         | Take 1 capsule by    |   240     | 0       | 20 |  |
| (PROGRAF) 1 mg       | mouth 2 times daily. | capsule   |         | 19       | 9         |
| capsuleIndications:  |  For a total dose of |           |         |          |           |
| Kidney replaced by   |  1 mg, by mouth, 2   |           |         |          |           |
| transplant           | times daily.         |           |         |          |           |
+----------------------+----------------------+-----------+---------+----------+-----------+
 documented as of this encounter
 
 Plan of Treatment
 
 
+--------+-----------+------------+----------------------+-------------------+
| Date   | Type      | Specialty  | Care Team            | Description       |
+--------+-----------+------------+----------------------+-------------------+
| / | Office    | Cardiology |   Tonia Wilson,    |                   |
|    | Visit     |            | ARNP  [address]   |                   |
|        |           |            | [place]  |                   |
|        |           |            | [address] [phone]  |                   |
|        |           |            |  [phone] (Fax)  |                   |
+--------+-----------+------------+----------------------+-------------------+
| / | Hospital  | Radiology  |   Popeye Townsend, |                   |
|    | Encounter |            |  MD  [address] |                   |
|        |           |            |  [place]   |                   |
|        |           |            | [place] [address]             |                   |
 
|        |           |            | [phone]         |                   |
|        |           |            | [phone] (Fax)   |                   |
+--------+-----------+------------+----------------------+-------------------+
| / | Surgery   | Radiology  |   Popeye Townsend, | CV EP PPM SYSTEM  |
|    |           |            |  MD  [address] | IMPLANT           |
|        |           |            |  St.  [place],   |                   |
|        |           |            | [place] [address]             |                   |
|        |           |            | [phone]         |                   |
|        |           |            | [phone] (Fax)   |                   |
+--------+-----------+------------+----------------------+-------------------+
| 02/10/ | Clinical  | Cardiology |                      |                   |
|    | Support   |            |                      |                   |
+--------+-----------+------------+----------------------+-------------------+
| / | Office    | Cardiology |   Tonia Wilson,    |                   |
|    | Visit     |            | ARNJESSICA  [address]   |                   |
|        |           |            | St  [place], [place]  |                   |
|        |           |            | [address]  [phone]  |                   |
|        |           |            |  [phone] (Fax)  |                   |
+--------+-----------+------------+----------------------+-------------------+
| / | Off-Site  | Nephrology |   Marzena Moser  |                   |
2020   | Visit     |            | DO ETIENNE  [address]      |                   |
|        |           |            | [address]      |                   |
|        |           |            | [place], [place]      |                   |
|        |           |            | [address]  [phone]  |                   |
|        |           |            |  [phone] (Fax)  |                   |
+--------+-----------+------------+----------------------+-------------------+
 documented as of this encounter
 
 Procedures
 
 
+----------------------+--------+-------------+----------------------+----------------------
+
| Procedure Name       | Priori | Date/Time   | Associated Diagnosis | Comments             
|
|                      | ty     |             |                      |                      
|
+----------------------+--------+-------------+----------------------+----------------------
+
| NM NUCLEAR STRESS    | Routin | 10/30/2019  |   Coronary artery    |   Results for this   
|
| TEST (PHARMACOLOGIC  | e      | 12:56 PM    | disease involving    | procedure are in the 
|
| - VASODILATOR)       |        | PDT         | native coronary      |  results section.    
|
|                      |        |             | artery of native     |                      
|
|                      |        |             | heart without angina |                      
|
|                      |        |             |  pectoris            |                      
|
|                      |        |             | Hypertension,        |                      
|
|                      |        |             | essential  Mixed     |                      
|
|                      |        |             | hyperlipidemia       |                      
|
+----------------------+--------+-------------+----------------------+----------------------
+
 documented in this encounter
 
 
 Results
 NM Nuclear Stress Test (Vasodilator) (10/30/2019 12:56 PM PDT)
 
+-------------+--------+-----------+-------------+--------------+
| Component   | Value  | Ref Range | Performed   | Pathologist  |
|             |        |           | At          | Signature    |
+-------------+--------+-----------+-------------+--------------+
| BASELINE    | 93     | bpm       | [facility] |              |
| HEART RATE  |        |           |             |              |
+-------------+--------+-----------+-------------+--------------+
| BASELINE    | 99/51  | mmHg      | [facility] |              |
| BLOOD       |        |           |             |              |
| PRESSURE    |        |           |             |              |
+-------------+--------+-----------+-------------+--------------+
| PEAK HEART  | 109    |           | [facility] |              |
| RATE        |        |           |             |              |
+-------------+--------+-----------+-------------+--------------+
| PEAK BLOOD  | 111/42 | mmHG      | [facility] |              |
| PRESSURE    |        |           |             |              |
+-------------+--------+-----------+-------------+--------------+
| Target HR   | 126    |           | [facility] |              |
+-------------+--------+-----------+-------------+--------------+
| Percent HR  | 74     |           | [facility] |              |
+-------------+--------+-----------+-------------+--------------+
| LVEF-SPECT  | 63     | %         | [facility] |              |
| NUCLEAR     |        |           |             |              |
| STRESS/VIAB |        |           |             |              |
| ILITY       |        |           |             |              |
+-------------+--------+-----------+-------------+--------------+
| ST          | 0.0    | mm        | [facility] |              |
| Elevation   |        |           |             |              |
| (mm)        |        |           |             |              |
+-------------+--------+-----------+-------------+--------------+
 
 
 
+----------+
| Specimen |
+----------+
|          |
+----------+
 
 
 
+----------------------------------------------------------------------+---------------+
| Narrative                                                            | Performed At  |
+----------------------------------------------------------------------+---------------+
|   This result has an attachment that is not available.  1.           |   [facility] |
|   Persantine EKG is negative.2.   Normal Persantine sestamibi        |               |
| myocardial perfusion imaging study.   Normal left ventricular size,  |               |
| wall thickness and motion.   Preserved left ventricular systolic     |               |
| function.   LVEF by gated SPECT is 63%.                              |               |
+----------------------------------------------------------------------+---------------+
 
 
 
+---------------+---------+--------------------+--------------+
| Performing    | Address | City/State/Zipcode | Phone Number |
| Organization  |         |                    |              |
 
+---------------+---------+--------------------+--------------+
|   [facility] |         |                    |              |
+---------------+---------+--------------------+--------------+
 documented in this encounter
 
 Visit Diagnoses
 
 
+-------------------------------------------------------------------------------------+
| Diagnosis                                                                           |
+-------------------------------------------------------------------------------------+
|   Coronary artery disease involving native coronary artery of native heart without  |
| angina pectoris                                                                     |
+-------------------------------------------------------------------------------------+
|   Hypertension, essential  Unspecified essential hypertension                       |
+-------------------------------------------------------------------------------------+
|   Mixed hyperlipidemia                                                              |
+-------------------------------------------------------------------------------------+
 documented in this encounter
 
 Administered Medications
 
 
+-----------------------------------+--------+----------+-------+------+------+
| Medication Order                  | MAR    | Action   | Dose  | Rate | Site |
|                                   | Action | Date     |       |      |      |
+-----------------------------------+--------+----------+-------+------+------+
|   aminophylline injection 75 mg   | Given  | 10/30/20 | 75 mg |      |      |
| 75 mg, Intravenous, ONCE PRN, per |        | 19 10:44 |       |      |      |
|  doctor, Starting Wed 10/30/19 at |        |  AM PDT  |       |      |      |
|  1043, For 1 dose, Nuclear        |        |          |       |      |      |
| Medicine                          |        |          |       |      |      |
+-----------------------------------+--------+----------+-------+------+------+
 
 
 
+---+---+
|   |   |
+---+---+
 
 
 
+-----------------------------------+-------+----------+----------+--------+---+
|   dipyridamole (PERSANTINE) 60mg  | Given | 10/30/20 | 16.8696  | 674.8  |   |
| in 40 mL NS syringe  0.142        |       | 19 11:00 | mg/min   | mL/hr  |   |
| mg/kg/min                         |       |  AM PDT  |          |        |   |
|  118.8 kg (674.784 mL/hr, rounded |       |          |          |        |   |
|  to 674.8 mL/hr), Intravenous,    |       |          |          |        |   |
| Administer over 4 Minutes, ONCE,  |       |          |          |        |   |
| Wed 10/30/19 at 1100, For 1 dose, |       |          |          |        |   |
|  Nuclear Medicine                 |       |          |          |        |   |
+-----------------------------------+-------+----------+----------+--------+---+
 
 
 
+---+---+
|   |   |
+---+---+
 
 
 
 
+---------------------------------+-------+----------+----------+---+---+
|   technetium TC-99M sestamibi   | Given | 10/30/20 | 10.1     |   |   |
| (CARDIOLITE) injection 10.1     |       | 19 10:44 | millicur |   |   |
| millicurie  10.1 millicurie,    |       |  AM PDT  | ies      |   |   |
| Intravenous, ONCE PRN, Other,   |       |          |          |   |   |
| Starting Wed 10/30/19 at 1043,  |       |          |          |   |   |
| For 1 dose, Nuclear Medicine    |       |          |          |   |   |
+---------------------------------+-------+----------+----------+---+---+
 
 
 
+---+---+
|   |   |
+---+---+
 documented in this encounter

## 2020-01-08 NOTE — XMS
Encounter Summary
  Created on: 2020
 
 Viviana Ricci
 External Reference #: [id number]
 : 47
 Sex: Female
 
 Demographics
 
 
+-----------------------+----------------------+
| Address               | [address]      |
|                       | [place]  [address] |
+-----------------------+----------------------+
| Home Phone            | [phone]      |
+-----------------------+----------------------+
| Preferred Language    | Unknown              |
+-----------------------+----------------------+
| Marital Status        | Single               |
+-----------------------+----------------------+
| Congregational Affiliation | [id number]                 |
+-----------------------+----------------------+
| Race                  | Unknown              |
+-----------------------+----------------------+
| Ethnic Group          | Unknown              |
+-----------------------+----------------------+
 
 
 Author
 
 
+--------------+--------------------------------------------+
| Author       | [facility]  |
|              | and [facility]                                |
+--------------+--------------------------------------------+
| Organization | [facility]  |
|              | and [facility]                                |
+--------------+--------------------------------------------+
| Address      | Unknown                                    |
+--------------+--------------------------------------------+
| Phone        | Unavailable                                |
+--------------+--------------------------------------------+
 
 
 
 Support
 
 
+----------------+--------------+---------------------+-----------------+
| Name           | Relationship | Address             | Phone           |
+----------------+--------------+---------------------+-----------------+
| Ada/Ed Radhames | ECON         | [address]              | [phone] |
|                |              | [place]  |                 |
|                |              |  [id number]              |                 |
+----------------+--------------+---------------------+-----------------+
 
 
 
 
 Care Team Providers
 
 
+-----------------------+------+-------------+
| Care Team Member Name | Role | Phone       |
+-----------------------+------+-------------+
 PCP  | Unavailable |
+-----------------------+------+-------------+
 
 
 
 Encounter Details
 
 
+--------+----------+---------------------+----------------------+-------------+
| Date   | Type     | Department          | Care Team            | Description |
+--------+----------+---------------------+----------------------+-------------+
| / | Abstract |   [facility]         |   Marzena Moser  |             |
|    |          | NEPHROLOGY  [address]   | M, DO  [address]      |             |
|        |          | [address]   | [address]      |             |
|        |          | [place], [place]     | [place]      |             |
|        |          | [address]          | [address]  [phone]  |             |
|        |          | [phone]        |  [phone] (Fax)  |             |
+--------+----------+---------------------+----------------------+-------------+
 
 
 
 Social History
 
 
+--------------+-------+-----------+--------+------+
| Tobacco Use  | Types | Packs/Day | Years  | Date |
|              |       |           | Used   |      |
+--------------+-------+-----------+--------+------+
| Never Smoker |       |           |        |      |
+--------------+-------+-----------+--------+------+
 
 
 
+---------------------+---+---+---+
| Smokeless Tobacco:  |   |   |   |
| Never Used          |   |   |   |
+---------------------+---+---+---+
 
 
 
+---------------------------------------------------------------+
| Comments: some second hand smoke exposure, but fairly minimal |
+---------------------------------------------------------------+
 
 
 
+-------------+-------------+---------+----------+
| Alcohol Use | Drinks/Week | oz/Week | Comments |
+-------------+-------------+---------+----------+
| No          |             |         |          |
+-------------+-------------+---------+----------+
 
 
 
 
+------------------+---------------+
| Sex Assigned at  | Date Recorded |
| Birth            |               |
+------------------+---------------+
| Not on file      |               |
+------------------+---------------+
 
 
 
+----------------+-------------+-------------+
| Job Start Date | Occupation  | Industry    |
+----------------+-------------+-------------+
| Not on file    | Not on file | Not on file |
+----------------+-------------+-------------+
 
 
 
+----------------+--------------+------------+
| Travel History | Travel Start | Travel End |
+----------------+--------------+------------+
 
 
 
+-------------------------------------+
| No recent travel history available. |
+-------------------------------------+
 documented as of this encounter
 
 Plan of Treatment
 
 
+--------+-----------+------------+----------------------+-------------------+
| Date   | Type      | Specialty  | Care Team            | Description       |
+--------+-----------+------------+----------------------+-------------------+
| / | Office    | Cardiology |   Tonia Wilson,    |                   |
|    | Visit     |            | ARNJESSICA  [address]   |                   |
|        |           |            | [place]  |                   |
|        |           |            | [address]  [phone]  |                   |
|        |           |            |  [phone] (Fax)  |                   |
+--------+-----------+------------+----------------------+-------------------+
| / | Hospital  | Radiology  |   Popeye Townsend, |                   |
|    | Encounter |            |  MD  [address] |                   |
|        |           |            |  St.  [place],   |                   |
|        |           |            | [place] [address]             |                   |
|        |           |            | [phone]         |                   |
|        |           |            | [phone] (Fax)   |                   |
+--------+-----------+------------+----------------------+-------------------+
| / | Surgery   | Radiology  |   Popeye Townsend, | CV EP PPM SYSTEM  |
|    |           |            |  MD  [address] | IMPLANT           |
|        |           |            |  St.  [place],   |                   |
|        |           |            | [place] [address]             |                   |
|        |           |            | [phone]         |                   |
|        |           |            | [phone] (Fax)   |                   |
+--------+-----------+------------+----------------------+-------------------+
| 02/10/ | Clinical  | Cardiology |                      |                   |
|    | Support   |            |                      |                   |
+--------+-----------+------------+----------------------+-------------------+
| / | Office    | Cardiology |   Tonia Wilson,    |                   |
|    | Visit     |            | ARNP  [address]   |                   |
 
|        |           |            | St  [place], [place]  |                   |
|        |           |            | [address]  [phone]  |                   |
|        |           |            |  [phone] (Fax)  |                   |
+--------+-----------+------------+----------------------+-------------------+
| / | Off-Site  | Nephrology |   Marzena Moser  |                   |
|    | Visit     |            | DO ETIENNE  [address] [place] [address]      |                   |
|        |           |            | [address]      |                   |
|        |           |            | [place]      |                   |
|        |           |            | [address]  [phone]  |                   |
|        |           |            |  [phone] (Fax)  |                   |
+--------+-----------+------------+----------------------+-------------------+
 documented as of this encounter
 
 Procedures
 
 
+---------------------+--------+------------+----------------------+----------------------+
| Procedure Name      | Priori | Date/Time  | Associated Diagnosis | Comments             |
|                     | ty     |            |                      |                      |
+---------------------+--------+------------+----------------------+----------------------+
| CULTURE, ORGANISM   | Routin | 2015 |                      |   Results for this   |
| ID, URINE (NON-ORD) | e      |            |                      | procedure are in the |
|                     |        |            |                      |  results section.    |
+---------------------+--------+------------+----------------------+----------------------+
 documented in this encounter
 
 Results
 Culture, Organism ID, Urine (2015)
 
+-----------+--------------------------+-----------+------------+--------------+
| Component | Value                    | Ref Range | Performed  | Pathologist  |
|           |                          |           | At         | Signature    |
+-----------+--------------------------+-----------+------------+--------------+
| Urine     | 100,000Comment: Lactose  | CFU/ML    |            |              |
| Culture,  |                 |           |            |              |
| Routine   |                          |           |            |              |
+-----------+--------------------------+-----------+------------+--------------+
 
 
 
+----------+
| Specimen |
+----------+
|          |
+----------+
 
 
 
+---------------------+----------------+--------+----------------+
| Organism            | Antibiotic     | Method | Susceptibility |
+---------------------+----------------+--------+----------------+
| Lactose Fermenting  | Ampicillin     |        |   Sensitive    |
| Gram Negative       |                |        |                |
| Bacilli             |                |        |                |
+---------------------+----------------+--------+----------------+
| Lactose Fermenting  | Amoxicillin +  |        |   Sensitive    |
| Gram Negative       | Clavulanate    |        |                |
| Bacilli             |                |        |                |
+---------------------+----------------+--------+----------------+
| Lactose Fermenting  | Aztreonam      |        |   Sensitive    |
 
| Gram Negative       |                |        |                |
| Bacilli             |                |        |                |
+---------------------+----------------+--------+----------------+
| Lactose Fermenting  | Ciprofloxacin  |        |   Sensitive    |
| Gram Negative       |                |        |                |
| Bacilli             |                |        |                |
+---------------------+----------------+--------+----------------+
| Lactose Fermenting  | Ceftriaxone    |        |   Sensitive    |
| Gram Negative       |                |        |                |
| Bacilli             |                |        |                |
+---------------------+----------------+--------+----------------+
| Lactose Fermenting  | Cefazolin      |        |   Sensitive    |
| Gram Negative       |                |        |                |
| Bacilli             |                |        |                |
+---------------------+----------------+--------+----------------+
| Lactose Fermenting  | Ertapenem      |        |   Sensitive    |
| Gram Negative       |                |        |                |
| Bacilli             |                |        |                |
+---------------------+----------------+--------+----------------+
| Lactose Fermenting  | Nitrofurantoin |        |   Sensitive    |
| Gram Negative       |                |        |                |
| Bacilli             |                |        |                |
+---------------------+----------------+--------+----------------+
| Lactose Fermenting  | Gentamicin     |        |   Sensitive    |
| Gram Negative       |                |        |                |
| Bacilli             |                |        |                |
+---------------------+----------------+--------+----------------+
| Lactose Fermenting  | Levofloxacin   |        |   Sensitive    |
| Gram Negative       |                |        |                |
| Bacilli             |                |        |                |
+---------------------+----------------+--------+----------------+
| Lactose Fermenting  | Tetracycline   |        |   Sensitive    |
| Gram Negative       |                |        |                |
| Bacilli             |                |        |                |
+---------------------+----------------+--------+----------------+
 documented in this encounter
 
 Visit Diagnoses
 Not on filedocumented in this encounter

## 2020-01-08 NOTE — XMS
Encounter Summary
  Created on: 2020
 
 Viviana Ricci
 External Reference #: [id number]
 : 47
 Sex: Female
 
 Demographics
 
 
+-----------------------+----------------------+
| Address               | [address]      |
|                       | [place]  [address] |
+-----------------------+----------------------+
| Home Phone            | [phone]      |
+-----------------------+----------------------+
| Preferred Language    | Unknown              |
+-----------------------+----------------------+
| Marital Status        | Single               |
+-----------------------+----------------------+
| Judaism Affiliation | [id number]                 |
+-----------------------+----------------------+
| Race                  | Unknown              |
+-----------------------+----------------------+
| Ethnic Group          | Unknown              |
+-----------------------+----------------------+
 
 
 Author
 
 
+--------------+--------------------------------------------+
| Author       | [facility]  |
|              | and [facility]                                |
+--------------+--------------------------------------------+
| Organization | [facility]  |
|              | and [facility]                                |
+--------------+--------------------------------------------+
| Address      | Unknown                                    |
+--------------+--------------------------------------------+
| Phone        | Unavailable                                |
+--------------+--------------------------------------------+
 
 
 
 Support
 
 
+----------------+--------------+---------------------+-----------------+
| Name           | Relationship | Address             | Phone           |
+----------------+--------------+---------------------+-----------------+
| Ada/Ed Radhames | ECON         | [address]              | [phone] |
|                |              | [place]  |                 |
|                |              |  [address]              |                 |
+----------------+--------------+---------------------+-----------------+
 
 
 
 
 Care Team Providers
 
 
+-----------------------+------+-------------+
| Care Team Member Name | Role | Phone       |
+-----------------------+------+-------------+
 PCP  | Unavailable |
+-----------------------+------+-------------+
 
 
 
 Reason for Visit
 
 
+-------------------+----------+
| Reason            | Comments |
+-------------------+----------+
| Kidney Transplant |          |
+-------------------+----------+
 
 
 
 Encounter Details
 
 
+--------+---------+---------------------+----------------------+----------------------+
| Date   | Type    | Department          | Care Team            | Description          |
+--------+---------+---------------------+----------------------+----------------------+
| / | Office  |   [facility]         |   ChengRosa lillyias  | Complications of     |
|    | Visit   | NEPHROLOGY  [address]   | M, DO  [address]      | transplanted kidney  |
|        |         | [address]   | [place]      | (Primary Dx);        |
|        |         | [place], [place]     | [place], [place]      | Unspecified          |
|        |         | [address]          | [address]  [phone]  | hypertensive kidney  |
|        |         | [phone]        |  [phone] (Fax)  | disease with chronic |
|        |         |                     |                      |  kidney disease      |
|        |         |                     |                      | stage I through      |
|        |         |                     |                      | stage IV, or         |
|        |         |                     |                      | unspecified;         |
|        |         |                     |                      | DIABETES MELLITUS,   |
|        |         |                     |                      | TYPE II,             |
|        |         |                     |                      | UNCONTROLLED;        |
|        |         |                     |                      | Hyperlipidemia       |
+--------+---------+---------------------+----------------------+----------------------+
 
 
 
 Social History
 
 
+--------------+-------+-----------+--------+------+
| Tobacco Use  | Types | Packs/Day | Years  | Date |
|              |       |           | Used   |      |
+--------------+-------+-----------+--------+------+
| Never Smoker |       |           |        |      |
+--------------+-------+-----------+--------+------+
 
 
 
+---------------------+---+---+---+
 
| Smokeless Tobacco:  |   |   |   |
| Never Used          |   |   |   |
+---------------------+---+---+---+
 
 
 
+-------------+-------------+---------+----------+
| Alcohol Use | Drinks/Week | oz/Week | Comments |
+-------------+-------------+---------+----------+
| No          |             |         |          |
+-------------+-------------+---------+----------+
 
 
 
+------------------+---------------+
| Sex Assigned at  | Date Recorded |
| Birth            |               |
+------------------+---------------+
| Not on file      |               |
+------------------+---------------+
 
 
 
+----------------+-------------+-------------+
| Job Start Date | Occupation  | Industry    |
+----------------+-------------+-------------+
| Not on file    | Not on file | Not on file |
+----------------+-------------+-------------+
 
 
 
+----------------+--------------+------------+
| Travel History | Travel Start | Travel End |
+----------------+--------------+------------+
 
 
 
+-------------------------------------+
| No recent travel history available. |
+-------------------------------------+
 documented as of this encounter
 
 Last Filed Vital Signs
 
 
+-------------------+---------------------+----------------------+----------+
| Vital Sign        | Reading             | Time Taken           | Comments |
+-------------------+---------------------+----------------------+----------+
| Blood Pressure    | 108/68              | 2012  1:14 PM  |          |
|                   |                     | PST                  |          |
+-------------------+---------------------+----------------------+----------+
| Pulse             | 64                  | 2012  1:14 PM  |          |
|                   |                     | PST                  |          |
+-------------------+---------------------+----------------------+----------+
| Temperature       | 36.9   C (98.5   F) | 2012  1:14 PM  |          |
|                   |                     | PST                  |          |
+-------------------+---------------------+----------------------+----------+
| Respiratory Rate  | -                   | -                    |          |
+-------------------+---------------------+----------------------+----------+
| Oxygen Saturation | -                   | -                    |          |
 
+-------------------+---------------------+----------------------+----------+
| Inhaled Oxygen    | -                   | -                    |          |
| Concentration     |                     |                      |          |
+-------------------+---------------------+----------------------+----------+
| Weight            | 132.2 kg (291 lb 8  | 2012  1:14 PM  |          |
|                   | oz)                 | PST                  |          |
+-------------------+---------------------+----------------------+----------+
| Height            | 167.6 cm (5' 6")    | 2012  1:14 PM  |          |
|                   |                     | PST                  |          |
+-------------------+---------------------+----------------------+----------+
| Body Mass Index   | 47.05               | 2012  1:14 PM  |          |
|                   |                     | PST                  |          |
+-------------------+---------------------+----------------------+----------+
 documented in this encounter
 
 Patient Instructions
 Patient Instructions Cherelle Julian RN - 2012  1:21 PM PSTFormatting of this 
note might be different from the original.
 2012 
 
 Viviana LEMA Radhames
 [address] [place] [address]
 
 Dear Viviana:
 
 Thank you for enrolling in [employer]. Please follow the instructions below to view your [facility] online medical record. [employer] allows you to send secure messages to your doctor, view you
r test results, renew your prescriptions, schedule appointments, and more.
 
  How Do I Sign Up?
 1. In your Internet browser, go to [email or web address]
 2. Click on the Sign Up Now link in the Sign In box.This will take you to the New Member Si
gn Up page.
 3. Enter your [employer] access code exactly as it appears below. You will not need to use thi
s code after you sign up. If you do not sign up before the expiration date, you must request
 a new code through your [facility].
 
 [employer] Access Code: [email or web address]
 Expires: 2013 13:21
 
 4. Fill in the last four digits of your Social Security Number (xxxx) and Date of Birth (mm
/dd/yyyy) when asked and click Submit. You will now be asked to create a [employer] ID.
 5. Create a [employer] ID. This will be your [employer] login ID. Your login ID cannot be changed
, so think of one that is secure and easy to remember.
 6. Create a [employer] password. You can change your password at any time.
 7. Enter your Password Reset Question and Answer. This can be used at a later time if you f
orget your password. 
 8. Enter your e-mail address. You will receive e-mail notification when new information is 
available in [employer].
 9. Click Sign Up. You may now view your medical record.
 
  Additional Information
 If you have questions, you can email [email or web address] or call 
[phone] to talk to our [facility] team. Please remember, [employer] should NOT be used fo
r urgent needs. For all medical emergencies, call 911.  
 
 Sincerely,
 
 Marzena Moser DO 
 
 Electronically signed by Cherelle Julian RN at 2012  1:22 PM PST
 documented in this encounter
 
 Progress Notes
 Marzena Moser DO - 2012 12:31 PM PSTFormatting of this note might be different
 from the original.
 Subjective: 
  
 Patient ID: Viivana Ricci is a 67 y.o. female.
 
 HPI Comments: 
 Followup for this 67 year old white female s/p renal allograft, 05, with previous allo
graft dysfunction secondary to FSGS, also with Type II DM, hypertension, hypothyroidism, hyp
erlipidemia, 
 SHPTH,  previous low back pain, obesity/JACK, chronic pulmonary  hypertension, possibly rela
jeanie to obesity?, and  CAD, s/p CABG x3 vessels,.   She states that she feels fairly well
.
 She denies dysuria, cough, and is taking fluids well.
 
 MEDS:
   
 furosemide (LASIX) 40 mg tablet, Take two tablets by mouth daily., Disp: 60 tablet, Rfl: 5
 rosuvastatin (CRESTOR) 40 MG tablet, Take 40 mg by mouth nightly.  , Disp: , Rfl: 
 (PROGRAF)--2.0 mg, AM , and 1.5 mg, PM. ,  
 insulin glargine (LANTUS) 100 units/mL injection, Inject 20 Units under the skin every morn
ing.
 cinacalcet (SENSIPAR) 30 mg tablet, Take 1 tablet by mouth Daily., Disp: 30 tablet, Rfl: 12
 fludrocortisone (FLORINEF) 0.1 mg tablet, Take 1 tablet by mouth Every other day.,  
 levothyroxine (LEVOTHROID) 50 mcg tablet, Take 1 tablet by mouth Daily., Disp: 30 tablet, R
fl: 11
 metoprolol tartrate (LOPRESSOR) 25 mg tablet, Take 25 mg by mouth 2 times daily., Disp: , R
fl: 
 Prenatal Multivit-Min-Fe-FA (PRENATAL VITAMINS) 0.8 MG TABS, Take 0.8 mg by mouth Daily., D
isp: , Rfl: 
 predniSONE (DELTASONE) 5 mg tablet, Take 5 mg by mouth Daily., Disp: , Rfl: 
 valsartan (DIOVAN) 160 mg tablet, Take 160 mg by mouth Daily., Disp: , Rfl: 
 omeprazole (PRILOSEC) 20 mg capsule, Take one capsule by mouth once daily on an empty stoma
ch, Disp: , Rfl: 
 allopurinol (ZYLOPRIM) 100 mg tablet, Take 100 mg by mouth Daily., Disp: , Rfl: 
 aspirin 81 MG EC tablet, Take 81 mg by mouth Daily., Disp: , Rfl: 
 loperamide (ANTI-DIARRHEAL) 2 mg capsule, Take 2 mg by mouth Daily as needed., Disp: , Rfl:
 
 mycophenolate (CELLCEPT) 250 mg capsule, Take 250 mg by mouth 3 times daily., Disp: , Rfl: 
 lisinopril (PRINIVIL,ZESTRIL) 30 MG tablet, Take 30 mg by mouth Daily., Disp: , Rfl: 
 niacin (NIASPAN) 500 mg CR tablet, Not taking, Disp: , Rfl: 
 Insulin Syringe-Needle U-100 (BD INSULIN SYRINGE ULTRAFINE) 31G X 5/16" 0.5 ML MISC, Use to
 inject insulin subcutaneously before meals or as directed, Disp: , Rfl: 
 insulin lispro (HUMALOG) 100 units/mL injection, Inject subcutaneously before meals accordi
ng to sliding scale, Disp: , Rfl: 
 Cholecalciferol (VITAMIN D3) 5000 UNITS CAPS, Take 5,000 Units by mouth Once a week., Disp:
 , Rfl: 
 magnesium oxide (MAG-OX) 400 mg tablet, Take 400 mg by mouth 2 times daily .
  Review of Systems
 
 Allergies no known allergies
 
 Objective:  /68 | Pulse 64 | Temp(Src) 36.9 C (98.5 F) (Oral) | Ht 1.676 m (5' 6"
) | Wt 132.224 kg (291 lb 8 oz) | BMI 47.05 kg/m2
  
 Physical Exam
 
 
 HEENT: No thrush.
 Heart:  Regular rate and rhythm with no S3, S4, murmur or rub.
 Lungs:  CTA bilaterally.  No rales or wheezes.
 Abdomen:  Soft, flat,  Renal allograft in RLQ is nontender, normoactive bowel sounds.
 Extremities: No clubbing, cyanosis, or edema. No  asterixis.  No foot ulcers.
 
 UA: SG 1.010, pH 5.0, 2+ protein, no blood, no glucose.
 
 LAB:   BUN 33, Cr 1.14, K+ 5.1, HCO3 21, glucose 140, HbA1c 8.1%, Mg++ 1.7, P04 2.7, PTH 20
7, , HDL 43, LDL 77, , to be DC 3.1, Hb 12.8, PLT 1 77,000, tacrolimus = 7.3 ng/
ml.
 
 Assessment: 
 
 1.  Renal Allograft--Scr is at baseline.
 2.  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
 3.  Type 2 DM--worsening control.
 4.  Hyperlipidemia--stable.
 5.  Hypertension--good control.
 6.  SHPTH--about same.
 7.  Obesity/JACK/Pulmonary hypertension--stable clinically.
 8.  CAD, s/p CABG, 1997--stable on ASA, metoprolol, atorvastatin.
 9.  Type IV RTA--stable.
 10. Chronic Low Back pain--in remission.
 
 Plan: 
 
 1.  I discussed with Viviana that her allograft, and tacrolimus levels are stable.  The FSGS d
oes not appear to be worsening over the last year.
 2.  I discussed increasing her Lantus by 10%, however she states that she may have been les
s fastidious about glycemic control last 45 days.  She promises to do more frequent monitori
ng and would like to reevaluate it later.
 3.    I told her that I think it would be a good idea to find a local PCP, or internist, an
d he'll to help her on a monthly basis assist with monitoring and her glycemic control.
 4.  Will plan to see her back in 4 months.  She will continue to have her standing order, a
nd drug level every 3 months.
 
 CC:   Jose David Dalal M.D., [facility]ronically signed by Marzena Moser DO at 2012  1:24 PM PSTdocumented in this encounter
 
 Plan of Treatment
 
 
+--------+-----------+------------+----------------------+-------------------+
| Date   | Type      | Specialty  | Care Team            | Description       |
+--------+-----------+------------+----------------------+-------------------+
| / | Office    | Cardiology |   Tonia Wilson,    |                   |
|    | Visit     |            | [facility]  [address]   |                   |
|        |           |            | St  [place], [place]  |                   |
|        |           |            | [id number] [phone]  |                   |
|        |           |            |  [phone] (Fax)  |                   |
+--------+-----------+------------+----------------------+-------------------+
| / | Hospital  | Radiology  |   Popeye Townsend, |                   |
|    | Encounter |            |  MD  [address] |                   |
|        |           |            |  St.  [place],   |                   |
|        |           |            | [place] [address]             |                   |
|        |           |            | [phone]         |                   |
|        |           |            | [phone] (Fax)   |                   |
+--------+-----------+------------+----------------------+-------------------+
 
| / | Surgery   | Radiology  |   Popeye Townsend, | CV EP PPM SYSTEM  |
|    |           |            |  MD  [address] | IMPLANT           |
|        |           |            |  St.  [place],   |                   |
|        |           |            | [place] [address]             |                   |
|        |           |            | [phone]         |                   |
|        |           |            | [phone] (Fax)   |                   |
+--------+-----------+------------+----------------------+-------------------+
| 02/10/ | Clinical  | Cardiology |                      |                   |
|    | Support   |            |                      |                   |
+--------+-----------+------------+----------------------+-------------------+
| / | Office    | Cardiology |   Hellberg, Tonia,    |                   |
|    | Visit     |            | [facility]  [address]   |                   |
|        |           |            |   [place]  |                   |
|        |           |            | [address]  [phone]  |                   |
|        |           |            |  [phone] (Fax)  |                   |
+--------+-----------+------------+----------------------+-------------------+
| / | Off-Site  | Nephrology |   Marzena Moser  |                   |
2020   | Visit     |            | DO ETIENNE  [address]      |                   |
|        |           |            | [address]      |                   |
|        |           |            | [place]      |                   |
|        |           |            | [address]  [phone]  |                   |
|        |           |            |  [phone] (Fax)  |                   |
+--------+-----------+------------+----------------------+-------------------+
 documented as of this encounter
 
 Procedures
 
 
+-------------------+--------+-------------+----------------------+----------------------+
| Procedure Name    | Priori | Date/Time   | Associated Diagnosis | Comments             |
|                   | ty     |             |                      |                      |
+-------------------+--------+-------------+----------------------+----------------------+
| POCT URINALYSIS,  | Routin | 2012  |   Complications of   |   Results for this   |
| AUTO WITH CONF    | e      |  1:06 PM    | transplanted kidney  | procedure are in the |
|                   |        | PST         |  Unspecified         |  results section.    |
|                   |        |             | hypertensive kidney  |                      |
|                   |        |             | disease with chronic |                      |
|                   |        |             |  kidney disease      |                      |
|                   |        |             | stage I through      |                      |
|                   |        |             | stage IV, or         |                      |
|                   |        |             | unspecified          |                      |
|                   |        |             | DIABETES MELLITUS,   |                      |
|                   |        |             | TYPE II,             |                      |
|                   |        |             | UNCONTROLLED         |                      |
|                   |        |             | Hyperlipidemia       |                      |
+-------------------+--------+-------------+----------------------+----------------------+
 documented in this encounter
 
 Results
 POCT Urinalysis Dipstick Automated (2012  1:06 PM PST)
 
+-------------+-----------+-----------+------------+--------------+
| Component   | Value     | Ref Range | Performed  | Pathologist  |
|             |           |           | At         | Signature    |
+-------------+-----------+-----------+------------+--------------+
| Color, UA,  | Yellow    |           |            |              |
| POC         |           |           |            |              |
+-------------+-----------+-----------+------------+--------------+
| Clarity,    | Clear     |           |            |              |
| UA, POC     |           |           |            |              |
 
+-------------+-----------+-----------+------------+--------------+
| Glucose,    | Negative  |           |            |              |
| UA, POC     |           |           |            |              |
+-------------+-----------+-----------+------------+--------------+
| Bilirubin,  | Negative  |           |            |              |
| UA, POC     |           |           |            |              |
+-------------+-----------+-----------+------------+--------------+
| Ketones,    | Negative  | Negative  |            |              |
| UA, POC     |           |           |            |              |
+-------------+-----------+-----------+------------+--------------+
| Specific    | 1.010     |           |            |              |
| Gravity,    |           |           |            |              |
| UA, POC     |           |           |            |              |
+-------------+-----------+-----------+------------+--------------+
| Blood, UA,  | Negative  |           |            |              |
| POC         |           |           |            |              |
+-------------+-----------+-----------+------------+--------------+
| pH, UA, POC | 5.0       |           |            |              |
+-------------+-----------+-----------+------------+--------------+
| Protein,    | 100 mg/dL |           |            |              |
| UA, POC     |           |           |            |              |
+-------------+-----------+-----------+------------+--------------+
| Urobilinoge | 0.2 mg/dL |           |            |              |
| n, UA, POC  |           |           |            |              |
+-------------+-----------+-----------+------------+--------------+
| Nitrite,    | Negative  |           |            |              |
| UA, POC     |           |           |            |              |
+-------------+-----------+-----------+------------+--------------+
| Leukocyte   | Negative  |           |            |              |
| Esterase,   |           |           |            |              |
| UA, POC     |           |           |            |              |
+-------------+-----------+-----------+------------+--------------+
| Reducing    |           | Negative  |            |              |
| Substances, |           |           |            |              |
|  Urine      |           |           |            |              |
+-------------+-----------+-----------+------------+--------------+
| Ictotest    |           | Negative  |            |              |
+-------------+-----------+-----------+------------+--------------+
| Remark      |           |           |            |              |
+-------------+-----------+-----------+------------+--------------+
 
 
 
+-----------------+
| Specimen        |
+-----------------+
| Urine specimen  |
| (specimen)      |
+-----------------+
 documented in this encounter
 
 Visit Diagnoses
 
 
+-----------------------------------------------------------------------------------------+
| Diagnosis                                                                               |
+-----------------------------------------------------------------------------------------+
|   Complications of transplanted kidney - Primary                                        |
+-----------------------------------------------------------------------------------------+
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
 
| stage IV, or unspecified(403.90)  Unspecified hypertensive kidney disease with chronic  |
| kidney disease stage I through stage IV, or unspecified                                 |
+-----------------------------------------------------------------------------------------+
|   DIABETES MELLITUS, TYPE II, UNCONTROLLED  Type II or unspecified type diabetes        |
| mellitus without mention of complication, uncontrolled                                  |
+-----------------------------------------------------------------------------------------+
|   Hyperlipidemia  Other and unspecified hyperlipidemia                                  |
+-----------------------------------------------------------------------------------------+
 documented in this encounter

## 2020-01-08 NOTE — XMS
Encounter Summary
  Created on: 2020
 
 Viviana Ricci
 External Reference #: [id number]
 : 47
 Sex: Female
 
 Demographics
 
 
+-----------------------+----------------------+
| Address               | [address]      |
|                       | [place]  [address] |
+-----------------------+----------------------+
| Home Phone            | [phone]      |
+-----------------------+----------------------+
| Preferred Language    | Unknown              |
+-----------------------+----------------------+
| Marital Status        | Single               |
+-----------------------+----------------------+
| Pentecostal Affiliation | [id number]                 |
+-----------------------+----------------------+
| Race                  | Unknown              |
+-----------------------+----------------------+
| Ethnic Group          | Unknown              |
+-----------------------+----------------------+
 
 
 Author
 
 
+--------------+--------------------------------------------+
| Author       | [facility]  |
|              | and [place]                                |
+--------------+--------------------------------------------+
| Organization | [facility]  |
|              | and [place]                                |
+--------------+--------------------------------------------+
| Address      | Unknown                                    |
+--------------+--------------------------------------------+
| Phone        | Unavailable                                |
+--------------+--------------------------------------------+
 
 
 
 Support
 
 
+----------------+--------------+---------------------+-----------------+
| Name           | Relationship | Address             | Phone           |
+----------------+--------------+---------------------+-----------------+
| Ada/Ed Radhames | ECON         | [place]              | [phone] |
|                |              | [place]  |                 |
|                |              |  [address]              |                 |
+----------------+--------------+---------------------+-----------------+
 
 
 
 
 Care Team Providers
 
 
+-----------------------+------+-------------+
| Care Team Member Name | Role | Phone       |
+-----------------------+------+-------------+
 PCP  | Unavailable |
+-----------------------+------+-------------+
 
 
 
 Reason for Visit
 
 
+-------------------+----------+
| Reason            | Comments |
+-------------------+----------+
| Medication Refill |          |
+-------------------+----------+
 
 
 
 Encounter Details
 
 
+--------+--------+---------------------+----------------------+-------------------+
| Date   | Type   | Department          | Care Team            | Description       |
+--------+--------+---------------------+----------------------+-------------------+
| / | Refill |   [facility]         |   Marzena Moser  | Medication Refill |
| 2016   |        | NEPHROLOGY  [address]   | M, DO  [address]      |                   |
|        |        | [address]   | [address]      |                   |
|        |        | [place], [place]     | [place], [place]      |                   |
|        |        | [address]          | [address]  [phone]  |                   |
|        |        | [phone]        |  [phone] (Fax)  |                   |
+--------+--------+---------------------+----------------------+-------------------+
 
 
 
 Social History
 
 
+--------------+-------+-----------+--------+------+
| Tobacco Use  | Types | Packs/Day | Years  | Date |
|              |       |           | Used   |      |
+--------------+-------+-----------+--------+------+
| Never Smoker |       |           |        |      |
+--------------+-------+-----------+--------+------+
 
 
 
+---------------------+---+---+---+
| Smokeless Tobacco:  |   |   |   |
| Never Used          |   |   |   |
+---------------------+---+---+---+
 
 
 
+---------------------------------------------------------------+
| Comments: some second hand smoke exposure, but fairly minimal |
 
+---------------------------------------------------------------+
 
 
 
+-------------+-------------+---------+----------+
| Alcohol Use | Drinks/Week | oz/Week | Comments |
+-------------+-------------+---------+----------+
| No          |             |         |          |
+-------------+-------------+---------+----------+
 
 
 
+------------------+---------------+
| Sex Assigned at  | Date Recorded |
| Birth            |               |
+------------------+---------------+
| Not on file      |               |
+------------------+---------------+
 
 
 
+----------------+-------------+-------------+
| Job Start Date | Occupation  | Industry    |
+----------------+-------------+-------------+
| Not on file    | Not on file | Not on file |
+----------------+-------------+-------------+
 
 
 
+----------------+--------------+------------+
| Travel History | Travel Start | Travel End |
+----------------+--------------+------------+
 
 
 
+-------------------------------------+
| No recent travel history available. |
+-------------------------------------+
 documented as of this encounter
 
 Plan of Treatment
 
 
+--------+-----------+------------+----------------------+-------------------+
| Date   | Type      | Specialty  | Care Team            | Description       |
+--------+-----------+------------+----------------------+-------------------+
| / | Office    | Cardiology |   HellalfredoTonia,    |                   |
|    | Visit     |            | ARNP  [address]   |                   |
|        |           |            | St  [place], [place]  |                   |
|        |           |            | [address]  [phone]  |                   |
|        |           |            |  [phone] (Fax)  |                   |
+--------+-----------+------------+----------------------+-------------------+
| / | Hospital  | Radiology  |   Popeye Townsend, |                   |
|    | Encounter |            |  MD  [address] |                   |
|        |           |            |  St.  [place],   |                   |
|        |           |            | [place] [address]             |                   |
|        |           |            | [phone]         |                   |
|        |           |            | [phone] (Fax)   |                   |
+--------+-----------+------------+----------------------+-------------------+
| / | Surgery   | Radiology  |   Popeye Townsend, | CV EP PPM SYSTEM  |
 
|    |           |            |  MD  [address] | IMPLANT           |
|        |           |            |  St.  [place],   |                   |
|        |           |            | [place] [address]             |                   |
|        |           |            | [phone]         |                   |
|        |           |            | [phone] (Fax)   |                   |
+--------+-----------+------------+----------------------+-------------------+
| 02/10/ | Clinical  | Cardiology |                      |                   |
|    | Support   |            |                      |                   |
+--------+-----------+------------+----------------------+-------------------+
| / | Office    | Cardiology |   Tonia Wilson,    |                   |
|    | Visit     |            | ARNP  [address]   |                   |
|        |           |            | [place]  |                   |
|        |           |            | [address]  [phone]  |                   |
|        |           |            |  [phone] (Fax)  |                   |
+--------+-----------+------------+----------------------+-------------------+
| / | Off-Site  | Nephrology |   Marznea Moser  |                   |
|    | Visit     |            | DO ETIENNE  [address] [place] [address]      |                   |
|        |           |            | [address]      |                   |
|        |           |            | [place]      |                   |
|        |           |            | [address]  [phone]  |                   |
|        |           |            |  [phone] (Fax)  |                   |
+--------+-----------+------------+----------------------+-------------------+
 documented as of this encounter
 
 Visit Diagnoses
 
 
+----------------------------------------------------------------------------------------+
| Diagnosis                                                                              |
+----------------------------------------------------------------------------------------+
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
| left (HCC) - Primary                                                                   |
+----------------------------------------------------------------------------------------+
 documented in this encounter

## 2020-01-08 NOTE — XMS
Encounter Summary
  Created on: 2020
 
 Viviana Ricci
 External Reference #: [id number]
 : 47
 Sex: Female
 
 Demographics
 
 
+-----------------------+----------------------+
| Address               | [address]      |
|                       | [place]  [address] |
+-----------------------+----------------------+
| Home Phone            | [phone]      |
+-----------------------+----------------------+
| Preferred Language    | Unknown              |
+-----------------------+----------------------+
| Marital Status        | Single               |
+-----------------------+----------------------+
| Jewish Affiliation | [id number]                 |
+-----------------------+----------------------+
| Race                  | Unknown              |
+-----------------------+----------------------+
| Ethnic Group          | Unknown              |
+-----------------------+----------------------+
 
 
 Author
 
 
+--------------+--------------------------------------------+
| Author       | [facility]  |
|              | and [place]                                |
+--------------+--------------------------------------------+
| Organization | [facility]  |
|              | and [place]                                |
+--------------+--------------------------------------------+
| Address      | Unknown                                    |
+--------------+--------------------------------------------+
| Phone        | Unavailable                                |
+--------------+--------------------------------------------+
 
 
 
 Support
 
 
+----------------+--------------+---------------------+-----------------+
| Name           | Relationship | Address             | Phone           |
+----------------+--------------+---------------------+-----------------+
| Ada/Ed Radhames | ECON         | [address]              | [phone] |
|                |              | [place]  |                 |
|                |              |  [address]              |                 |
+----------------+--------------+---------------------+-----------------+
 
 
 
 
 Care Team Providers
 
 
+-----------------------+------+-------------+
| Care Team Member Name | Role | Phone       |
+-----------------------+------+-------------+
 PCP  | Unavailable |
+-----------------------+------+-------------+
 
 
 
 Encounter Details
 
 
+--------+-----------+----------------------+----------------------+-------------+
| Date   | Type      | Department           | Care Team            | Description |
+--------+-----------+----------------------+----------------------+-------------+
| / | [facility]  |   [facility] |   Marzena Moser  |             |
|  - | Encounter |  MED CTR MED ONC     | M, DO  [address]      |             |
|        |           | [address]  | [address]      |             |
| / |           | [place] [address] | [place]      |             |
|    |           |   [phone]       | [address]  [phone]  |             |
|        |           |                      |  [phone] (Fax)  |             |
+--------+-----------+----------------------+----------------------+-------------+
 
 
 
 Social History
 
 
+----------------+-------+-----------+--------+------+
| Tobacco Use    | Types | Packs/Day | Years  | Date |
|                |       |           | Used   |      |
+----------------+-------+-----------+--------+------+
| Never Assessed |       |           |        |      |
+----------------+-------+-----------+--------+------+
 
 
 
+------------------+---------------+
| Sex Assigned at  | Date Recorded |
| Birth            |               |
+------------------+---------------+
| Not on file      |               |
+------------------+---------------+
 
 
 
+----------------+-------------+-------------+
| Job Start Date | Occupation  | Industry    |
+----------------+-------------+-------------+
| Not on file    | Not on file | Not on file |
+----------------+-------------+-------------+
 
 
 
+----------------+--------------+------------+
| Travel History | Travel Start | Travel End |
+----------------+--------------+------------+
 
 
 
 
+-------------------------------------+
| No recent travel history available. |
+-------------------------------------+
 documented as of this encounter
 
 Plan of Treatment
 
 
+--------+-----------+------------+----------------------+-------------------+
| Date   | Type      | Specialty  | Care Team            | Description       |
+--------+-----------+------------+----------------------+-------------------+
| / | Office    | Cardiology |   Tonia Wilson,    |                   |
|    | Visit     |            | ARNJESSICA  [address]   |                   |
|        |           |            | St  [place], [place]  |                   |
|        |           |            | [address]  [phone]  |                   |
|        |           |            |  [phone] (Fax)  |                   |
+--------+-----------+------------+----------------------+-------------------+
| / | Hospital  | Radiology  |   Popeye Townsend, |                   |
|    | Encounter |            |  MD  [address] |                   |
|        |           |            |  St.  [place],   |                   |
|        |           |            | [place] [address]             |                   |
|        |           |            | [phone]         |                   |
|        |           |            | [phone] (Fax)   |                   |
+--------+-----------+------------+----------------------+-------------------+
| / | Surgery   | Radiology  |   Popeye Townsend, | CV EP PPM SYSTEM  |
|    |           |            |  MD  [address] | IMPLANT           |
|        |           |            |  St.  [place],   |                   |
|        |           |            | [place] [address]             |                   |
|        |           |            | [phone]         |                   |
|        |           |            | [phone] (Fax)   |                   |
+--------+-----------+------------+----------------------+-------------------+
| 02/10/ | Clinical  | Cardiology |                      |                   |
|    | Support   |            |                      |                   |
+--------+-----------+------------+----------------------+-------------------+
| / | Office    | Cardiology |   Tonia Wilson,    |                   |
|    | Visit     |            | ARNP  [address]   |                   |
|        |           |            | [place]  |                   |
|        |           |            | [address]  [phone]  |                   |
|        |           |            |  [phone] (Fax)  |                   |
+--------+-----------+------------+----------------------+-------------------+
| / | Off-Site  | Nephrology |   Marzena Moser  |                   |
|    | Visit     |            | DO ETIENNE  [address] [place] [address]      |                   |
|        |           |            | [address]      |                   |
|        |           |            | [place]      |                   |
|        |           |            | [address] [phone]  |                   |
|        |           |            |  [phone] (Fax)  |                   |
+--------+-----------+------------+----------------------+-------------------+
 documented as of this encounter
 
 Visit Diagnoses
 Not on filedocumented in this encounter

## 2020-01-08 NOTE — XMS
Encounter Summary
  Created on: 2020
 
 Viviana Ricci
 External Reference #: [id number]
 : 47
 Sex: Female
 
 Demographics
 
 
+-----------------------+----------------------+
| Address               | [address]      |
|                       | [place]  [address] |
+-----------------------+----------------------+
| Home Phone            | [phone]      |
+-----------------------+----------------------+
| Preferred Language    | Unknown              |
+-----------------------+----------------------+
| Marital Status        | Single               |
+-----------------------+----------------------+
| Gnosticism Affiliation | 1009                 |
+-----------------------+----------------------+
| Race                  | Unknown              |
+-----------------------+----------------------+
| Ethnic Group          | Unknown              |
+-----------------------+----------------------+
 
 
 Author
 
 
+--------------+--------------------------------------------+
| Author       | [facility]  |
|              | and [facility]                                |
+--------------+--------------------------------------------+
| Organization | [facility]  |
|              | and [place]                                |
+--------------+--------------------------------------------+
| Address      | Unknown                                    |
+--------------+--------------------------------------------+
| Phone        | Unavailable                                |
+--------------+--------------------------------------------+
 
 
 
 Support
 
 
+----------------+--------------+---------------------+-----------------+
| Name           | Relationship | Address             | Phone           |
+----------------+--------------+---------------------+-----------------+
| Ada/Ed Radhames | ECON         | [address]              | [phone] |
|                |              | [place]  |                 |
|                |              |  [address]              |                 |
+----------------+--------------+---------------------+-----------------+
 
 
 
 
 Care Team Providers
 
 
+-----------------------+------+-------------+
| Care Team Member Name | Role | Phone       |
+-----------------------+------+-------------+
 PCP  | Unavailable |
+-----------------------+------+-------------+
 
 
 
 Reason for Visit
 
 
+--------+-----------------------------+
| Reason | Comments                    |
+--------+-----------------------------+
| Other  | Patient refused O2 delivery |
+--------+-----------------------------+
 
 
 
 Encounter Details
 
 
+--------+-----------+----------------------+----------------+----------------------+
| Date   | Type      | Department           | Care Team      | Description          |
+--------+-----------+----------------------+----------------+----------------------+
| / | Telephone |   [facility]          |   Ydaielenstein,  | Other (Patient       |
|    |           | PULMONARY  [address]     | Nena GUSMAN MD  | refused O2 delivery) |
|        |           | [place]  [place], |                |                      |
|        |           |  [place] [address]       |                |                      |
|        |           | [phone]         |                |                      |
+--------+-----------+----------------------+----------------+----------------------+
 
 
 
 Social History
 
 
+--------------+-------+-----------+--------+------+
| Tobacco Use  | Types | Packs/Day | Years  | Date |
|              |       |           | Used   |      |
+--------------+-------+-----------+--------+------+
| Never Smoker |       |           |        |      |
+--------------+-------+-----------+--------+------+
 
 
 
+---------------------+---+---+---+
| Smokeless Tobacco:  |   |   |   |
| Never Used          |   |   |   |
+---------------------+---+---+---+
 
 
 
+---------------------------------------------------------------+
| Comments: some second hand smoke exposure, but fairly minimal |
+---------------------------------------------------------------+
 
 
 
 
+-------------+-------------+---------+----------+
| Alcohol Use | Drinks/Week | oz/Week | Comments |
+-------------+-------------+---------+----------+
| No          |             |         |          |
+-------------+-------------+---------+----------+
 
 
 
+------------------+---------------+
| Sex Assigned at  | Date Recorded |
| Birth            |               |
+------------------+---------------+
| Not on file      |               |
+------------------+---------------+
 
 
 
+----------------+-------------+-------------+
| Job Start Date | Occupation  | Industry    |
+----------------+-------------+-------------+
| Not on file    | Not on file | Not on file |
+----------------+-------------+-------------+
 
 
 
+----------------+--------------+------------+
| Travel History | Travel Start | Travel End |
+----------------+--------------+------------+
 
 
 
+-------------------------------------+
| No recent travel history available. |
+-------------------------------------+
 documented as of this encounter
 
 Plan of Treatment
 
 
+--------+-----------+------------+----------------------+-------------------+
| Date   | Type      | Specialty  | Care Team            | Description       |
+--------+-----------+------------+----------------------+-------------------+
| / | Office    | Cardiology |   Tonia Wilsno,    |                   |
| 2020   | Visit     |            | ARNJESSICA  [address]   |                   |
|        |           |            | St [place], [place]  |                   |
|        |           |            | [address]  [phone]  |                   |
|        |           |            |  [phone] (Fax)  |                   |
+--------+-----------+------------+----------------------+-------------------+
| / | Hospital  | Radiology  |   Popeye Townsend, |                   |
|    | Encounter |            |  MD [address] |                   |
|        |           |            |  [place],   |                   |
|        |           |            | [place] [address]             |                   |
|        |           |            | [phone]         |                   |
|        |           |            | [phone] (Fax)   |                   |
+--------+-----------+------------+----------------------+-------------------+
| / | Surgery   | Radiology  |   Popeye Townsend, | CV EP PPM SYSTEM  |
|    |           |            |  MD  [address] | IMPLANT           |
 
|        |           |            |  [place],   |                   |
|        |           |            | [place] [address]             |                   |
|        |           |            | [phone]         |                   |
|        |           |            | [phone] (Fax)   |                   |
+--------+-----------+------------+----------------------+-------------------+
| 02/10/ | Clinical  | Cardiology |                      |                   |
|    | Support   |            |                      |                   |
+--------+-----------+------------+----------------------+-------------------+
| / | Office    | Cardiology |   Tonia Wilson,    |                   |
|    | Visit     |            | BELKIS  [address]   |                   |
|        |           |            | [place]  |                   |
|        |           |            | [address]  [phone]  |                   |
|        |           |            |  [phone] (Fax)  |                   |
+--------+-----------+------------+----------------------+-------------------+
| / | Off-Site  | Nephrology |   Marzena Moser  |                   |
|    | Visit     |            | M, DO  [address]      |                   |
|        |           |            | [address]      |                   |
|        |           |            | [place]      |                   |
|        |           |            | [address] [phone]  |                   |
|        |           |            |  [phone] (Fax)  |                   |
+--------+-----------+------------+----------------------+-------------------+
 documented as of this encounter
 
 Visit Diagnoses
 Not on filedocumented in this encounter

## 2020-01-08 NOTE — XMS
Encounter Summary
  Created on: 2020
 
 Viviana Ricci
 External Reference #: [id number]
 : 47
 Sex: Female
 
 Demographics
 
 
+-----------------------+----------------------+
| Address               | [address]      |
|                       | [place]  [address] |
+-----------------------+----------------------+
| Home Phone            | [phone]      |
+-----------------------+----------------------+
| Preferred Language    | Unknown              |
+-----------------------+----------------------+
| Marital Status        | Single               |
+-----------------------+----------------------+
| Druze Affiliation | 1009                 |
+-----------------------+----------------------+
| Race                  | Unknown              |
+-----------------------+----------------------+
| Ethnic Group          | Unknown              |
+-----------------------+----------------------+
 
 
 Author
 
 
+--------------+--------------------------------------------+
| Author       | [facility]  |
|              | and [place]                                |
+--------------+--------------------------------------------+
| Organization | [facility]  |
|              | and [place]                                |
+--------------+--------------------------------------------+
| Address      | Unknown                                    |
+--------------+--------------------------------------------+
| Phone        | Unavailable                                |
+--------------+--------------------------------------------+
 
 
 
 Support
 
 
+----------------+--------------+---------------------+-----------------+
| Name           | Relationship | Address             | Phone           |
+----------------+--------------+---------------------+-----------------+
| Ada/Ed Radhames | ECON         | [address]              | [phone] |
|                |              | [place]  |                 |
|                |              |  [address]              |                 |
+----------------+--------------+---------------------+-----------------+
 
 
 
 
 Care Team Providers
 
 
+-----------------------+------+-------------+
| Care Team Member Name | Role | Phone       |
+-----------------------+------+-------------+
 PCP  | Unavailable |
+-----------------------+------+-------------+
 
 
 
 Reason for Visit
 
 
+-------------------+----------+
| Reason            | Comments |
+-------------------+----------+
| Medication Refill |          |
+-------------------+----------+
 
 
 
 Encounter Details
 
 
+--------+--------+---------------------+----------------------+-------------------+
| Date   | Type   | Department          | Care Team            | Description       |
+--------+--------+---------------------+----------------------+-------------------+
| / | Refill |   [place]         |   Marzena Moser  | Medication Refill |
| 2017   |        | NEPHROLOGY  [address]   | M, DO  [address]      |                   |
|        |        | [address]   | [address]      |                   |
|        |        | [place], [place]     | [place], [place]      |                   |
|        |        | [address]          | [address]  [phone]  |                   |
|        |        | [phone]        |  [phone] (Fax)  |                   |
+--------+--------+---------------------+----------------------+-------------------+
 
 
 
 Social History
 
 
+--------------+-------+-----------+--------+------+
| Tobacco Use  | Types | Packs/Day | Years  | Date |
|              |       |           | Used   |      |
+--------------+-------+-----------+--------+------+
| Never Smoker |       |           |        |      |
+--------------+-------+-----------+--------+------+
 
 
 
+---------------------+---+---+---+
| Smokeless Tobacco:  |   |   |   |
| Never Used          |   |   |   |
+---------------------+---+---+---+
 
 
 
+---------------------------------------------------------------+
| Comments: some second hand smoke exposure, but fairly minimal |
 
+---------------------------------------------------------------+
 
 
 
+-------------+-------------+---------+----------+
| Alcohol Use | Drinks/Week | oz/Week | Comments |
+-------------+-------------+---------+----------+
| No          |             |         |          |
+-------------+-------------+---------+----------+
 
 
 
+------------------+---------------+
| Sex Assigned at  | Date Recorded |
| Birth            |               |
+------------------+---------------+
| Not on file      |               |
+------------------+---------------+
 
 
 
+----------------+-------------+-------------+
| Job Start Date | Occupation  | Industry    |
+----------------+-------------+-------------+
| Not on file    | Not on file | Not on file |
+----------------+-------------+-------------+
 
 
 
+----------------+--------------+------------+
| Travel History | Travel Start | Travel End |
+----------------+--------------+------------+
 
 
 
+-------------------------------------+
| No recent travel history available. |
+-------------------------------------+
 documented as of this encounter
 
 Plan of Treatment
 
 
+--------+-----------+------------+----------------------+-------------------+
| Date   | Type      | Specialty  | Care Team            | Description       |
+--------+-----------+------------+----------------------+-------------------+
| / | Office    | Cardiology |   HellalfredoTonia,    |                   |
|    | Visit     |            | ARNP  [address]   |                   |
|        |           |            | St  [place], [place]  |                   |
|        |           |            | [address]  [phone]  |                   |
|        |           |            |  [phone] (Fax)  |                   |
+--------+-----------+------------+----------------------+-------------------+
| / | Hospital  | Radiology  |   Popeye Townsend, |                   |
|    | Encounter |            |  MD  [address] |                   |
|        |           |            |  St.  [place],   |                   |
|        |           |            | [place] [address]             |                   |
|        |           |            | [phone]         |                   |
|        |           |            | [phone] (Fax)   |                   |
+--------+-----------+------------+----------------------+-------------------+
| / | Surgery   | Radiology  |   Popeye Townsend, | CV EP PPM SYSTEM  |
 
|    |           |            |  MD  [address] | IMPLANT           |
|        |           |            |  St.  [place],   |                   |
|        |           |            | [place] [address]             |                   |
|        |           |            | [phone]         |                   |
|        |           |            | [phone] (Fax)   |                   |
+--------+-----------+------------+----------------------+-------------------+
| 02/10/ | Clinical  | Cardiology |                      |                   |
|    | Support   |            |                      |                   |
+--------+-----------+------------+----------------------+-------------------+
| / | Office    | Cardiology |   Tonia Wilson,    |                   |
|    | Visit     |            | ARNP  [address]   |                   |
|        |           |            | [place]  |                   |
|        |           |            | [address]  [phone]  |                   |
|        |           |            |  [phone] (Fax)  |                   |
+--------+-----------+------------+----------------------+-------------------+
| / | Off-Site  | Nephrology |   Marzena Moser  |                   |
|    | Visit     |            | DO ETIENNE  [address] [place] [address]      |                   |
|        |           |            | [address]      |                   |
|        |           |            | [place]      |                   |
|        |           |            | [address]  [phone]  |                   |
|        |           |            |  [phone] (Fax)  |                   |
+--------+-----------+------------+----------------------+-------------------+
 documented as of this encounter
 
 Visit Diagnoses
 Not on filedocumented in this encounter

## 2020-01-08 NOTE — XMS
Encounter Summary
  Created on: 2020
 
 Viviana Ricci
 External Reference #: [id number]
 : 47
 Sex: Female
 
 Demographics
 
 
+-----------------------+----------------------+
| Address               | [address]      |
|                       | [place]  [address] |
+-----------------------+----------------------+
| Home Phone            | [phone]      |
+-----------------------+----------------------+
| Preferred Language    | Unknown              |
+-----------------------+----------------------+
| Marital Status        | Single               |
+-----------------------+----------------------+
| Pentecostalism Affiliation | [id number]                 |
+-----------------------+----------------------+
| Race                  | Unknown              |
+-----------------------+----------------------+
| Ethnic Group          | Unknown              |
+-----------------------+----------------------+
 
 
 Author
 
 
+--------------+--------------------------------------------+
| Author       | [facility]  |
|              | and [facility]                                |
+--------------+--------------------------------------------+
| Organization | [facility]  |
|              | and [facility]                                |
+--------------+--------------------------------------------+
| Address      | Unknown                                    |
+--------------+--------------------------------------------+
| Phone        | Unavailable                                |
+--------------+--------------------------------------------+
 
 
 
 Support
 
 
+----------------+--------------+---------------------+-----------------+
| Name           | Relationship | Address             | Phone           |
+----------------+--------------+---------------------+-----------------+
| Ada/Ed Radhames | ECON         | [address]              | [phone] |
|                |              | [place]  |                 |
|                |              |  [address]              |                 |
+----------------+--------------+---------------------+-----------------+
 
 
 
 
 Care Team Providers
 
 
+-----------------------+------+-------------+
| Care Team Member Name | Role | Phone       |
+-----------------------+------+-------------+
 PCP  | Unavailable |
+-----------------------+------+-------------+
 
 
 
 Reason for Visit
 
 
+--------+----------+
| Reason | Comments |
+--------+----------+
| Cough  |          |
+--------+----------+
 
 
 
 Encounter Details
 
 
+--------+---------+----------------------+----------------+----------------------+
| Date   | Type    | Department           | Care Team      | Description          |
+--------+---------+----------------------+----------------+----------------------+
| / | Office  |   [facility] [place]          |   [place],  | Cough (Primary Dx);  |
|    | Visit   | PULMONARY  [address]     | Nena GUSMAN MD  | Pulmonary            |
|        |         | [place]  [place], |                | hypertension (HCC);  |
|        |         |  [place] [address]       |                | Nocturnal hypoxemia; |
|        |         | [phone]         |                |  ONOFRE on CPAP;        |
|        |         |                      |                | Dyspnea              |
+--------+---------+----------------------+----------------+----------------------+
 
 
 
 Social History
 
 
+--------------+-------+-----------+--------+------+
| Tobacco Use  | Types | Packs/Day | Years  | Date |
|              |       |           | Used   |      |
+--------------+-------+-----------+--------+------+
| Never Smoker |       |           |        |      |
+--------------+-------+-----------+--------+------+
 
 
 
+---------------------+---+---+---+
| Smokeless Tobacco:  |   |   |   |
| Never Used          |   |   |   |
+---------------------+---+---+---+
 
 
 
+---------------------------------------------------------------+
| Comments: some second hand smoke exposure, but fairly minimal |
 
+---------------------------------------------------------------+
 
 
 
+-------------+-------------+---------+----------+
| Alcohol Use | Drinks/Week | oz/Week | Comments |
+-------------+-------------+---------+----------+
| No          |             |         |          |
+-------------+-------------+---------+----------+
 
 
 
+------------------+---------------+
| Sex Assigned at  | Date Recorded |
| Birth            |               |
+------------------+---------------+
| Not on file      |               |
+------------------+---------------+
 
 
 
+----------------+-------------+-------------+
| Job Start Date | Occupation  | Industry    |
+----------------+-------------+-------------+
| Not on file    | Not on file | Not on file |
+----------------+-------------+-------------+
 
 
 
+----------------+--------------+------------+
| Travel History | Travel Start | Travel End |
+----------------+--------------+------------+
 
 
 
+-------------------------------------+
| No recent travel history available. |
+-------------------------------------+
 documented as of this encounter
 
 Last Filed Vital Signs
 
 
+-------------------+-------------------+----------------------+----------+
| Vital Sign        | Reading           | Time Taken           | Comments |
+-------------------+-------------------+----------------------+----------+
| Blood Pressure    | 120/80            | 2013  2:12 PM  |          |
|                   |                   | PDT                  |          |
+-------------------+-------------------+----------------------+----------+
| Pulse             | 67                | 2013  2:12 PM  |          |
|                   |                   | PDT                  |          |
+-------------------+-------------------+----------------------+----------+
| Temperature       | -                 | -                    |          |
+-------------------+-------------------+----------------------+----------+
| Respiratory Rate  | -                 | -                    |          |
+-------------------+-------------------+----------------------+----------+
| Oxygen Saturation | 96%               | 2013  2:12 PM  |          |
|                   |                   | PDT                  |          |
+-------------------+-------------------+----------------------+----------+
| Inhaled Oxygen    | -                 | -                    |          |
 
| Concentration     |                   |                      |          |
+-------------------+-------------------+----------------------+----------+
| Weight            | 135.1 kg (297 lb  | 2013  2:12 PM  |          |
|                   | 14.4 oz)          | PDT                  |          |
+-------------------+-------------------+----------------------+----------+
| Height            | 165.1 cm (5' 5")  | 2013  2:12 PM  |          |
|                   |                   | PDT                  |          |
+-------------------+-------------------+----------------------+----------+
| Body Mass Index   | 49.57             | 2013  2:12 PM  |          |
|                   |                   | PDT                  |          |
+-------------------+-------------------+----------------------+----------+
 documented in this encounter
 
 Patient Instructions
 Patient Instructions Nena Boykin MD - 2013  3:05 PM PDTDo breathing test.
 
 I will get the CPAP download (hopefully). If the sleep apnea is well controlled, then I hakeem
l add in oxygen. If the sleep apnea is not well controlled, I will adjust the machine before
 starting oxygen as your saturation is really fairly borderline when discounting artifact. 
 
 I will see you back in a few weeks to review. 
 Electronically signed by Nena Boykin MD at 2013  3:07 PM PDT
 documented in this encounter
 
 Progress Notes
 Nena Boykin MD - 2013  2:41 PM PDTFormatting of this note might be differe
nt from the original.
 Pulmonary Follow Up
 
 HPI 
  
 Viviana Ricci is a 67 y.o. female patient of Marzena Moser here today for follow up of 
cough and shortness of breath. 
 
 She did get her overnight oximetry done and her echocardiogram. She took her CPAP to [employer] for a download, but we did not receive that. 
 
 She notes that the cough and wheezing has not yet returned. She has good days and bad days 
where she will fatigued, weak and short of breath. Some days she can walk further than other
s. 
 
 Her nocturnal oximetry did show that she does have some evidence of desaturations, which ar
e fairly minor. The full graphical data was not received, but it looks like the worst of the
 desaturations (down to 76%) were likely artifact, but she did likely spend some of the nigh
t at 86-87%. 
 
 Her echocardiogram was inconclusive, as it did not show evidence of pulmonary hypertension,
 but it could not be excluded because of body habitus issues. 
 
 Past Medical History
 Past Medical History 
 Diagnosis Date 
   Kidney replaced by transplant  
   Complication of transplanted kidney  
   Coronary artery disease  
   s/p CABG , cardiac cath in  
   Pulmonary hypertension  
   thought secondary to nocturnal hypoxemia 
   Obesity hypoventilation syndrome  
   ONOFRE on CPAP  
 
   Diabetes mellitus, type 2  
   Secondary hyperparathyroidism  
   Hypothyroidism  
   Hyperlipidemia  
   Chronic low back pain  
   Movement disorder  
   tremor of unclear etiology 
   DJD (degenerative joint disease)  
   s/p knee replacement 
   Humerus fracture 2003 
   right supracondylar distal humerus fracture 
   Carpal tunnel syndrome  
   Anemia in chronic renal disease  
   resolved after transplant 
   Infection with CMV (cytomegalovirus) 2008 
   complicating kidney transplant 
   FSGS (focal segmental glomerulosclerosis)  
   ESRD (end stage renal disease)  
  
  
 Past Surgical History
 Past Surgical History 
 Procedure Date 
   Cholecystectomy  
   Insert peritoneal catheter 1998 
   x 2 
   Kidney transplant 2000 
   Total knee arthroplasty 2004 
   Right 
   Hysterectomy 2003 
   Abdominal exploration surgery 2006 
   Parathyroidectomy 2007 
   Carpal tunnel release 2005 
   Coronary artery bypass graft 1998 
   5v 
  
 
 Social History: 
 History 
 
 Social History 
   Marital Status: Single 
   Spouse Name: N/A 
   Number of Children: N/A 
   Years of Education: N/A 
 
 Occupational History 
   .  Disabled 
 
 Social History Main Topics 
   Smoking status: Never Smoker  
   Smokeless tobacco: Never Used 
  Comment: some second hand smoke exposure, but fairly minimal 
   Alcohol Use: No 
   Drug Use: No 
   Sexually Active: None 
 
 Other Topics Concern 
   None 
 
 
 Social History Narrative 
  Lives in [place] alone. Has a dog at home. No other animal exposures. Had birds as a chi
ld. Grew up in [place], [place]. 
 
 Allergies:
 No Known Allergies  
 
 Medications:
 Outpatient Encounter Prescriptions as of 2013 
 Medication Sig Dispense Refill 
   Insulin Syringe-Needle U-100 (BD INSULIN SYRINGE ULTRAFINE) 31G X 5/16" 0.5 ML MISC Use
 before meals and as directed.  100 each  11 
   metoprolol tartrate (LOPRESSOR) 25 mg tablet Take 1 tablet by mouth 2 times daily.  60 
tablet  11 
   Prenatal Multivit-Min-Fe-FA (PRENATAL VITAMINS) 0.8 MG TABS Take 0.8 mg by mouth Daily.
  30 each  11 
   valsartan (DIOVAN) 160 mg tablet Take 1 tablet by mouth Daily.  30 tablet  11 
   albuterol (PROAIR HFA) 90 mcg/puff inhaler Inhale 2 puffs into the lungs every 6 hours 
as needed for Wheezing.  1 Inhaler  2 
   lisinopril (PRINIVIL,ZESTRIL) 30 MG tablet Take 1 tablet by mouth Daily.  90 tablet  3 
   predniSONE (DELTASONE) 5 mg tablet Take 1 tablet by mouth Daily.  90 tablet  3 
   glucose blood VI test strips (ONE TOUCH ULTRA TEST) strip Check blood sugar before each
 meal and as directed  100 each  12 
   furosemide (LASIX) 40 mg tablet Take two tablets by mouth daily.  60 tablet  5 
   rosuvastatin (CRESTOR) 40 MG tablet Take 40 mg by mouth nightly.       
   tacrolimus (PROGRAF) 1 mg capsule Take 1 mg by mouth 2 times daily.     
   tacrolimus (PROGRAF) 0.5 mg capsule Take 0.5 mg by mouth 2 times daily.     
   insulin glargine (LANTUS) 100 units/mL injection Inject 20 Units under the skin every m
orning.       
   cinacalcet (SENSIPAR) 30 mg tablet Take 1 tablet by mouth Daily.  30 tablet  12 
   fludrocortisone (FLORINEF) 0.1 mg tablet Take 1 tablet by mouth Every other day.  30 ta
blet  11 
   levothyroxine (LEVOTHROID) 50 mcg tablet Take 1 tablet by mouth Daily.  30 tablet  11 
   omeprazole (PRILOSEC) 20 mg capsule Take one capsule by mouth once daily on an empty st
omach     
   allopurinol (ZYLOPRIM) 100 mg tablet Take 100 mg by mouth Daily.     
   aspirin 81 MG EC tablet Take 81 mg by mouth Daily.     
   loperamide (ANTI-DIARRHEAL) 2 mg capsule Take 2 mg by mouth Daily as needed.     
   mycophenolate (CELLCEPT) 250 mg capsule Take 250 mg by mouth 3 times daily.     
   insulin lispro (HUMALOG) 100 units/mL injection Inject subcutaneously before meals acco
rding to sliding scale     
   Cholecalciferol (VITAMIN D3) 5000 UNITS CAPS Take 5,000 Units by mouth Once a week.    
 
   magnesium oxide (MAG-OX) 400 mg tablet Take 400 mg by mouth 2 times daily.     
   DISCONTD: niacin (NIASPAN) 500 mg CR tablet Not taking     
 
 Objective 
 
 /80 | Pulse 67 | Ht 1.651 m (5' 5") | Wt 135.127 kg (297 lb 14.4 oz) | BMI 49.57 kg/m
2 | SpO2 96%
 
 General Appearance:  Alert, cooperative, no distress, appears stated age, occasional tremor
s 
 Head:  Normocephalic, without obvious abnormality, atraumatic 
 Eyes:  PERRL, conjunctiva clear, no scleral icterus, EOM's intact 
 Ears:  Normal TM's, external auditory canals, normal acuity 
 Nose: Nares normal, septum midline, mucosa normal 
 Mouth: No oral lesions or exudate 
 Neck: Supple, symmetrical, no adenopathy 
 Lungs:   No accessory muscle use, breath sounds are clear to auscultation bilaterally, no w
 
heezes, crackles or rhonchi 
 Chest Wall:  No deformity 
 Heart:  Regular rate and rhythm, no murmur, rub or gallop 
 Abdomen:   Soft, non-tender, non-distended, obese 
 Extremities:  No cyanosis, clubbing, or edema 
 Pulses: Radial pulses 2+ and symmetric 
 Skin: Warm and dry 
 Lymph nodes: Cervical and supraclavicular nodes normal 
 
 Data:
 Overnight oximetry was done and reviewed. See results above.
 
 Echocardiogram was done and reviewed. See results above.
 
 CPAP download received after visit, and will be reviewed with patient at next visit. Her AH
I was well controlled, but her compliance is poor. She used it for greater than 4 hours only
 about 30% of the time. 
 
 Immunization History 
 Administered Date(s) Administered 
   INFLUENZA, PRESERVATIVE FREE IM 2012 
 
 Assessment /Plan 
 Ms. Ricci was seen today for follow up of cough, fatigue and dyspnea.
 I have put some thought in to this and I think there may be a couple of things going on her
e. First, the cough and wheezing may be a sign of volume overload that comes on intermittent
ly due to some renal insufficiency. Another possibility os an asthma variant. I am going to 
do a methacholine challenge test to exclude asthma. It will have to be done on her baseline 
prednisone dose, which she takes for her transplant. That being said, her symptoms occur on 
this as well, and at this point, this is her physiologic hormonal state. 
 The fatigue and baseline dyspnea is likely due to her pulmonary hypertension (some of which
 is volume overload related as we see she has an elevated wedge pressure on right heart cath
), and also due to obesity and undertreated sleep apnea and hypoxemia. Her CPAP usage has be
en low of late and needs to improve. I suspect that is part of the reason she feels poorly. 
We need to bleed oxygen in to her CPAP as her saturation is slightly low. 
 Then, if she is maximally tuned up medically, we need to get her in to physical therapy and
 moving again. 
 
 Diagnoses and associated orders for this visit:
 
 Cough
 Suspect fluid overload, we will exclude asthma. 
 - Pulmonary Fx Tests (Hospital Performed); methacholine challenge test
 
 Pulmonary hypertension
 Though echocardiogram was inconclusive, I am sure it is still present to some degree. 
 - Oxygen Therapy; 2LPM bleed in to CPAP
 
 Nocturnal hypoxemia
 Secondary to multiple things including pulmonary hypertension, volume overload, and obesity
 hypoventilation. 
 - Oxygen Therapy; start 2LPM bleed in
 
 Onofre on cpap
 Poor compliance of late, which likely makes her feel poorly. AHI is well controlled on her 
CPAP. 
 
 Dyspnea
 I suspect this is multifactorial as above. 
 
 
 20 minutes were spent with Ms. Ricci with greater than 50% spent in counseling and coordina
tion of care.
 
 She was advised to call if new pulmonary symptoms were to develop.
 
 Return to clinic in 2-3 weeks, or sooner with concerns. We will get the methacholine challe
nge test done, and start the oxygen bleed in before then. 
 
 CC: Marzena Moser
 
 Portions of this report were transcribed using voice recognition software.  Every effort wa
s made to ensure accuracy; however, inadvertent computerized transcription errors may be pre
sent.
 
 Electronically signed by Nena Boykin MD at 2013  5:19 PM PDTdocumented in t
his encounter
 
 Plan of Treatment
 
 
+--------+-----------+------------+----------------------+-------------------+
| Date   | Type      | Specialty  | Care Team            | Description       |
+--------+-----------+------------+----------------------+-------------------+
| / | Office    | Cardiology |   Tonia Wilson,    |                   |
|    | Visit     |            | ARNJESSICA  [address]   |                   |
|        |           |            | St  [place], [place]  |                   |
|        |           |            | [address]  [phone]  |                   |
|        |           |            |  [phone] (Fax)  |                   |
+--------+-----------+------------+----------------------+-------------------+
| / | Hospital  | Radiology  |   Popeye Townsend, |                   |
|    | Encounter |            |  MD  [address] |                   |
|        |           |            |  St.  [place],   |                   |
|        |           |            | [place] [address]             |                   |
|        |           |            | [phone]         |                   |
|        |           |            | [phone] (Fax)   |                   |
+--------+-----------+------------+----------------------+-------------------+
| / | Surgery   | Radiology  |   Popeye Townsend, | CV EP PPM SYSTEM  |
|    |           |            |  MD  [address] | IMPLANT           |
|        |           |            |  St.  [place],   |                   |
|        |           |            | [place] [address]             |                   |
|        |           |            | [phone]         |                   |
|        |           |            | [phone] (Fax)   |                   |
+--------+-----------+------------+----------------------+-------------------+
| 02/10/ | Clinical  | Cardiology |                      |                   |
|    | Support   |            |                      |                   |
+--------+-----------+------------+----------------------+-------------------+
| / | Office    | Cardiology |   Tonia Wilson,    |                   |
|    | Visit     |            | ARNP  [address]   |                   |
|        |           |            | [place]  |                   |
|        |           |            | [address]  [phone]  |                   |
|        |           |            |  [phone] (Fax)  |                   |
+--------+-----------+------------+----------------------+-------------------+
| / | Off-Site  | Nephrology |   Marzena Moser  |                   |
|    | Visit     |            | DO ETIENNE  [address] [place] [address]      |                   |
|        |           |            | [address]      |                   |
|        |           |            | [place]      |                   |
|        |           |            | [address]  [phone]  |                   |
|        |           |            |  [phone] (Fax)  |                   |
+--------+-----------+------------+----------------------+-------------------+
 
 
 
 
+----------------------+-------------+--------+----------------------+----------------------
+
| Name                 | Type        | Priori | Associated Diagnoses | Order Schedule       
|
|                      |             | ty     |                      |                      
|
+----------------------+-------------+--------+----------------------+----------------------
+
| Pulmonary Fx Tests   | Respiratory | ASAP   |   Cough              | 1 Occurrences        
|
| (Hospital Performed) |  Care       |        |                      | starting 2013  
|
|                      |             |        |                      | until 2014     
|
+----------------------+-------------+--------+----------------------+----------------------
+
| Oxygen Therapy       | Respiratory | Routin |   Pulmonary          | Expected:            
|
|                      |  Care       | e      | hypertension (HCC)   | 2013, Expires: 
|
|                      |             |        | Nocturnal hypoxia    |  2014          
|
|                      |             |        | ONOFRE on CPAP          |                      
|
+----------------------+-------------+--------+----------------------+----------------------
+
 documented as of this encounter
 
 Visit Diagnoses
 
 
+------------------------------------------------------------------------+
| Diagnosis                                                              |
+------------------------------------------------------------------------+
|   Cough - Primary                                                      |
+------------------------------------------------------------------------+
|   Pulmonary hypertension (HCC)  Other chronic pulmonary heart diseases |
+------------------------------------------------------------------------+
|   Nocturnal hypoxemia  Hypoxemia                                       |
+------------------------------------------------------------------------+
|   ONOFRE on CPAP  Obstructive sleep apnea (adult) (pediatric)             |
+------------------------------------------------------------------------+
|   Dyspnea  Other dyspnea and respiratory abnormality                   |
+------------------------------------------------------------------------+
 documented in this encounter

## 2020-01-08 NOTE — XMS
Encounter Summary
  Created on: 2020
 
 Viviana Ricci
 External Reference #: [id number]
 : 47
 Sex: Female
 
 Demographics
 
 
+-----------------------+----------------------+
| Address               | [address]      |
|                       | [place]  [address] |
+-----------------------+----------------------+
| Home Phone            | [phone]      |
+-----------------------+----------------------+
| Preferred Language    | Unknown              |
+-----------------------+----------------------+
| Marital Status        | Single               |
+-----------------------+----------------------+
| Mosque Affiliation | [id number]                 |
+-----------------------+----------------------+
| Race                  | Unknown              |
+-----------------------+----------------------+
| Ethnic Group          | Unknown              |
+-----------------------+----------------------+
 
 
 Author
 
 
+--------------+--------------------------------------------+
| Author       | [facility]  |
|              | and [facility]                                |
+--------------+--------------------------------------------+
| Organization | [facility]  |
|              | and [facility]                                |
+--------------+--------------------------------------------+
| Address      | Unknown                                    |
+--------------+--------------------------------------------+
| Phone        | Unavailable                                |
+--------------+--------------------------------------------+
 
 
 
 Support
 
 
+----------------+--------------+---------------------+-----------------+
| Name           | Relationship | Address             | Phone           |
+----------------+--------------+---------------------+-----------------+
| Ada/Ed Radhames | ECON         | [address]              | [phone] |
|                |              | [place], [place]  |                 |
|                |              |  [id number]              |                 |
+----------------+--------------+---------------------+-----------------+
 
 
 
 
 Care Team Providers
 
 
+-----------------------+------+-------------+
| Care Team Member Name | Role | Phone       |
+-----------------------+------+-------------+
 PCP  | Unavailable |
+-----------------------+------+-------------+
 
 
 
 Reason for Referral
 Diagnostic/Screening (Routine)
 
+--------+--------+-----------+--------------+--------------+---------------+
| Status | Reason | Specialty | Diagnoses /  | Referred By  | Referred To   |
|        |        |           | Procedures   | Contact      | Contact       |
+--------+--------+-----------+--------------+--------------+---------------+
| Closed |        | Radiology |   Diagnoses  |   [place],   |   [facility]     |
|        |        |           |  Chronic     | Edgar GARCIA MD   | [address]  |
|        |        |           | right        | [address] |  [place], |
|        |        |           | shoulder     |   [place]      |  [place]           |
|        |        |           | pain         | [place]    | [address]    |
|        |        |           | Procedures   | [address]        | Phone:        |
|        |        |           | MRI Shoulder | Phone:       | [phone]  |
|        |        |           |  Right wo    | [phone] |  Fax:         |
|        |        |           | Contrast     |   Fax:       | [phone]  |
|        |        |           |              | [phone] |               |
+--------+--------+-----------+--------------+--------------+---------------+
 
 
 
 
 Reason for Visit
 Diagnostic/Screening (Routine)
 
+--------+--------+-----------+--------------+--------------+---------------+
| Status | Reason | Specialty | Diagnoses /  | Referred By  | Referred To   |
|        |        |           | Procedures   | Contact      | Contact       |
+--------+--------+-----------+--------------+--------------+---------------+
| Closed |        | Radiology |   Diagnoses  |   [place],   |   [facility]     |
|        |        |           |  Chronic     | Edgar GARCIA MD   | [address]  |
|        |        |           | right        | [address] |  [place], |
|        |        |           | shoulder     |   [place]      |  [place]           |
|        |        |           | pain         | [place]    | [address]    |
|        |        |           | Procedures   | [address]        | Phone:        |
|        |        |           | MRI Shoulder | Phone:       | [phone]  |
|        |        |           |  Right wo    | [phone] |  Fax:         |
|        |        |           | Contrast     |   Fax:       | [phone]  |
|        |        |           |              | [phone] |               |
+--------+--------+-----------+--------------+--------------+---------------+
 
 
 
 
 Encounter Details
 
 
+--------+-----------+----------------------+----------------------+----------------+
 
| Date   | Type      | Department           | Care Team            | Description    |
+--------+-----------+----------------------+----------------------+----------------+
| 03/10/ | Hospital  |   [facility] |   Edgar Sanders,   | Chronic right  |
| 2017   | Encounter |  [facility] MRI  [address]  | [facility]     | shoulder pain  |
|        |           | [address]  [place], | [place]      |                |
|        |           |  [place] [address]       | [address]  [phone]  |                |
|        |           | [phone]         |  [phone] (Fax)  |                |
+--------+-----------+----------------------+----------------------+----------------+
 
 
 
 Social History
 
 
+--------------+-------+-----------+--------+------+
| Tobacco Use  | Types | Packs/Day | Years  | Date |
|              |       |           | Used   |      |
+--------------+-------+-----------+--------+------+
| Never Smoker |       |           |        |      |
+--------------+-------+-----------+--------+------+
 
 
 
+---------------------+---+---+---+
| Smokeless Tobacco:  |   |   |   |
| Never Used          |   |   |   |
+---------------------+---+---+---+
 
 
 
+---------------------------------------------------------------+
| Comments: some second hand smoke exposure, but fairly minimal |
+---------------------------------------------------------------+
 
 
 
+-------------+-------------+---------+----------+
| Alcohol Use | Drinks/Week | oz/Week | Comments |
+-------------+-------------+---------+----------+
| No          |             |         |          |
+-------------+-------------+---------+----------+
 
 
 
+------------------+---------------+
| Sex Assigned at  | Date Recorded |
| Birth            |               |
+------------------+---------------+
| Not on file      |               |
+------------------+---------------+
 
 
 
+----------------+-------------+-------------+
| Job Start Date | Occupation  | Industry    |
+----------------+-------------+-------------+
| Not on file    | Not on file | Not on file |
+----------------+-------------+-------------+
 
 
 
 
+----------------+--------------+------------+
| Travel History | Travel Start | Travel End |
+----------------+--------------+------------+
 
 
 
+-------------------------------------+
| No recent travel history available. |
+-------------------------------------+
 documented as of this encounter
 
 Medications at Time of Discharge
 
 
+----------------------+----------------------+-----------+---------+----------+-----------+
| Medication           | Sig                  | Dispensed | Refills | Start    | End Date  |
|                      |                      |           |         | Date     |           |
+----------------------+----------------------+-----------+---------+----------+-----------+
|   aspirin 81 mg EC   | Take 81 mg by mouth  |           | 0       |          |           |
| tablet               | Daily.               |           |         |          |           |
+----------------------+----------------------+-----------+---------+----------+-----------+
|   cholecalciferol    | Take 2,000 Units by  |           | 0       |          |           |
| (VITAMIN D-3) 2000   | mouth Every other    |           |         |          |           |
| UNITS                | day.                 |           |         |          |           |
| TABSIndications:     |                      |           |         |          |           |
| Unspecified          |                      |           |         |          |           |
| hypertensive kidney  |                      |           |         |          |           |
| disease with chronic |                      |           |         |          |           |
|  kidney disease      |                      |           |         |          |           |
| stage I through      |                      |           |         |          |           |
| stage IV, or         |                      |           |         |          |           |
| unspecified(403.90), |                      |           |         |          |           |
|  FSGS (focal         |                      |           |         |          |           |
| segmental            |                      |           |         |          |           |
| glomerulosclerosis), |                      |           |         |          |           |
|  Hyperlipidemia,     |                      |           |         |          |           |
| Complications of     |                      |           |         |          |           |
| transplanted kidney  |                      |           |         |          |           |
+----------------------+----------------------+-----------+---------+----------+-----------+
|   glucose blood VI   | Check blood sugar    |   100     | 12      | 20 |           |
| test strips (ONE     | before each meal and | each      |         | 12       |           |
| TOUCH ULTRA TEST)    |  as directed         |           |         |          |           |
| stripIndications:    |                      |           |         |          |           |
| Unspecified          |                      |           |         |          |           |
| hypertensive kidney  |                      |           |         |          |           |
| disease with chronic |                      |           |         |          |           |
|  kidney disease      |                      |           |         |          |           |
| stage I through      |                      |           |         |          |           |
| stage IV, or         |                      |           |         |          |           |
| unspecified(403.90), |                      |           |         |          |           |
|  Complications of    |                      |           |         |          |           |
| transplanted kidney, |                      |           |         |          |           |
|  Diabetes mellitus   |                      |           |         |          |           |
| type II,             |                      |           |         |          |           |
| uncontrolled (HCC),  |                      |           |         |          |           |
| Hyperlipidemia       |                      |           |         |          |           |
+----------------------+----------------------+-----------+---------+----------+-----------+
|   loperamide         | Take 1 capsule by    |   60      | 5       | 20 |           |
| (ANTI-DIARRHEAL) 2   | mouth 4 times daily  | capsule   |         | 14       |           |
 
| mg                   | as needed.           |           |         |          |           |
| capsuleIndications:  |                      |           |         |          |           |
| Unspecified          |                      |           |         |          |           |
| hypertensive kidney  |                      |           |         |          |           |
| disease with chronic |                      |           |         |          |           |
|  kidney disease      |                      |           |         |          |           |
| stage I through      |                      |           |         |          |           |
| stage IV, or         |                      |           |         |          |           |
| unspecified(403.90), |                      |           |         |          |           |
|  FSGS (focal         |                      |           |         |          |           |
| segmental            |                      |           |         |          |           |
| glomerulosclerosis), |                      |           |         |          |           |
|  Hypothyroidism,     |                      |           |         |          |           |
| Hyperlipidemia       |                      |           |         |          |           |
+----------------------+----------------------+-----------+---------+----------+-----------+
|   Respiratory        | Decrease CPAP to     |   1 each  | 0       | 20 |           |
| Therapy Supplies     | 12-18 cmH2O          |           |         | 13       |           |
| MISC                 | Diagnosis            |           |         |          |           |
|                      | Code(s)327.23.       |           |         |          |           |
|                      | Please send order to |           |         |          |           |
|                      |  [facility].     |           |         |          |           |
+----------------------+----------------------+-----------+---------+----------+-----------+
|   allopurinol        | Take 1 tablet by     |   30      | 11      | 20 |  |
| (ZYLOPRIM) 100 mg    | mouth Daily.         | tablet    |         | 17       | 8         |
| tablet               |                      |           |         |          |           |
+----------------------+----------------------+-----------+---------+----------+-----------+
|   cinacalcet         | Take 1 tablet by     |   30      | 11      | 20 |  |
| (SENSIPAR) 30 mg     | mouth Daily.         | tablet    |         | 17       | 8         |
| tablet               |                      |           |         |          |           |
+----------------------+----------------------+-----------+---------+----------+-----------+
|   fludrocortisone    | Take 1 tablet by     |   90      | 4       | 20 |  |
| (FLORINEF) 0.1 mg    | mouth Every other    | tablet    |         | 17       | 8         |
| tabletIndications:   | day.                 |           |         |          |           |
| Kidney replaced by   |                      |           |         |          |           |
| transplant,          |                      |           |         |          |           |
| Hypertension,        |                      |           |         |          |           |
| essential, Type 2 DM |                      |           |         |          |           |
|  with CKD stage 2    |                      |           |         |          |           |
| and hypertension     |                      |           |         |          |           |
| ([facility]), Coronary      |                      |           |         |          |           |
| artery disease       |                      |           |         |          |           |
| involving native     |                      |           |         |          |           |
| coronary artery of   |                      |           |         |          |           |
| native heart without |                      |           |         |          |           |
|  angina pectoris     |                      |           |         |          |           |
+----------------------+----------------------+-----------+---------+----------+-----------+
|   furosemide (LASIX) | Take 1 tablet by     |   30      | 5       | 20 |  |
|  40 mg               | mouth Daily as       | tablet    |         | 15       | 8         |
| tabletIndications:   | needed.              |           |         |          |           |
| Unspecified          |                      |           |         |          |           |
| hypertensive kidney  |                      |           |         |          |           |
| disease with chronic |                      |           |         |          |           |
|  kidney disease      |                      |           |         |          |           |
| stage I through      |                      |           |         |          |           |
| stage IV, or         |                      |           |         |          |           |
| unspecified(403.90), |                      |           |         |          |           |
|  FSGS (focal         |                      |           |         |          |           |
| segmental            |                      |           |         |          |           |
| glomerulosclerosis), |                      |           |         |          |           |
|  Hyperlipidemia      |                      |           |         |          |           |
 
+----------------------+----------------------+-----------+---------+----------+-----------+
|   insulin glargine   | Inject 20 Units      |   10 mL   | 11      | 20 | 10/27/201 |
| (LANTUS) 100         | under the skin every |           |         | 16       | 7         |
| units/mL injection   |  morning.            |           |         |          |           |
| (vial)Indications:   |                      |           |         |          |           |
| Type 2 diabetes      |                      |           |         |          |           |
| mellitus with ESRD   |                      |           |         |          |           |
| (end-stage renal     |                      |           |         |          |           |
| disease) (HCC),      |                      |           |         |          |           |
| Complication of      |                      |           |         |          |           |
| transplanted kidney, |                      |           |         |          |           |
|  unspecified         |                      |           |         |          |           |
| complication,        |                      |           |         |          |           |
| Diabetes mellitus    |                      |           |         |          |           |
| type II,             |                      |           |         |          |           |
| uncontrolled (HCC),  |                      |           |         |          |           |
| Hyperlipidemia,      |                      |           |         |          |           |
| unspecified          |                      |           |         |          |           |
| hyperlipidemia type  |                      |           |         |          |           |
+----------------------+----------------------+-----------+---------+----------+-----------+
|   insulin lispro     | Inject               |   10 vial | 11      | 20 |  |
| (HUMALOG) 100        | subcutaneously       |           |         | 16       | 7         |
| units/mL injection   | before meals         |           |         |          |           |
| (vial)               | according to sliding |           |         |          |           |
|                      |  scale               |           |         |          |           |
+----------------------+----------------------+-----------+---------+----------+-----------+
|   Insulin            | Use before meals and |   100     | 11      | 20 |  |
| Syringe-Needle U-100 |  as directed.        | each      |         | 16       | 7         |
|  (BD INSULIN SYRINGE |                      |           |         |          |           |
|  ULTRAFINE) 31G X    |                      |           |         |          |           |
| 5/16" 0.5 ML         |                      |           |         |          |           |
| MISCIndications:     |                      |           |         |          |           |
| Type II diabetes     |                      |           |         |          |           |
| mellitus,            |                      |           |         |          |           |
| uncontrolled (HCC),  |                      |           |         |          |           |
| Insulin dependent    |                      |           |         |          |           |
| type 2 diabetes      |                      |           |         |          |           |
| mellitus,            |                      |           |         |          |           |
| uncontrolled (HCC)   |                      |           |         |          |           |
+----------------------+----------------------+-----------+---------+----------+-----------+
|   levothyroxine      | Take 1 tablet by     |   30      | 11      | 20 |  |
| (LEVOTHROID) 50 mcg  | mouth Daily.         | tablet    |         | 16       | 7         |
| tablet               |                      |           |         |          |           |
+----------------------+----------------------+-----------+---------+----------+-----------+
|   lisinopril         | Take 1 tablet by     |   90      | 3       | 20 |  |
| (PRINIVIL,ZESTRIL)   | mouth Daily.         | tablet    |         | 17       | 7         |
| 30 MG tablet         |                      |           |         |          |           |
+----------------------+----------------------+-----------+---------+----------+-----------+
|   losartan (COZAAR)  | Take 1 tablet by     |   90      | 3       | 20 |  |
| 50 mg                | mouth Daily.         | tablet    |         | 17       | 8         |
| tabletIndications:   |                      |           |         |          |           |
| Essential            |                      |           |         |          |           |
| hypertension with    |                      |           |         |          |           |
| goal blood pressure  |                      |           |         |          |           |
| less than 130/80,    |                      |           |         |          |           |
| Kidney replaced by   |                      |           |         |          |           |
| transplant, Other    |                      |           |         |          |           |
| specified            |                      |           |         |          |           |
| hypothyroidism, Type |                      |           |         |          |           |
|  2 DM with CKD stage |                      |           |         |          |           |
 
|  2 and hypertension  |                      |           |         |          |           |
| (HCC)                |                      |           |         |          |           |
+----------------------+----------------------+-----------+---------+----------+-----------+
|   magnesium oxide    | Take 1 tablet by     |   60      | 11      | 20 |  |
| (MAG-OX) 400 mg      | mouth 2 times daily. | tablet    |         | 16       | 7         |
| tablet               |                      |           |         |          |           |
+----------------------+----------------------+-----------+---------+----------+-----------+
|   metoprolol         | Take 1 tablet by     |   60      | 11      | 20 |  |
| tartrate (LOPRESSOR) | mouth 2 times daily. | tablet    |         | 16       | 7         |
|  25 mg tablet        |                      |           |         |          |           |
+----------------------+----------------------+-----------+---------+----------+-----------+
|   mycophenolate      | TAKE (1) CAPSULE BY  |   90      | 11      | 20 | 10/27/201 |
| (CELLCEPT) 250 mg    | MOUTH THREE TIMES    | capsule   |         | 16       | 7         |
| capsuleIndications:  | DAILY.               |           |         |          |           |
| Kidney replaced by   |                      |           |         |          |           |
| transplant           |                      |           |         |          |           |
+----------------------+----------------------+-----------+---------+----------+-----------+
|   omeprazole         | Take one capsule by  |   90      | 4       | 20 |  |
| (PRILOSEC) 20 mg     | mouth once daily on  | capsule   |         | 16       | 7         |
| capsule              | an empty stomach     |           |         |          |           |
+----------------------+----------------------+-----------+---------+----------+-----------+
|   predniSONE         | Take 1 tablet by     |   90      | 3       | 20 |  |
| (DELTASONE) 5 mg     | mouth Daily.         | tablet    |         | 17       | 7         |
| tablet               |                      |           |         |          |           |
+----------------------+----------------------+-----------+---------+----------+-----------+
|   Prenatal           | Take 0.8 mg by mouth |   30 each | 11      | 10/10/20 | 10/25/201 |
| Multivit-Min-Fe-FA   |  Daily.              |           |         | 16       | 7         |
| (PRENATAL VITAMINS)  |                      |           |         |          |           |
| 0.8 MG TABS          |                      |           |         |          |           |
+----------------------+----------------------+-----------+---------+----------+-----------+
|   rosuvastatin       | Take 1 tablet by     |   30      | 11      | 20 |  |
| (CRESTOR) 20 mg      | mouth nightly.       | tablet    |         | 16       | 7         |
| tablet               |                      |           |         |          |           |
+----------------------+----------------------+-----------+---------+----------+-----------+
|   tacrolimus         | TAKE (1) CAPSULE     |   60      | 11      | 20 |  |
| (PROGRAF) 0.5 mg     | TWICE DAILY WITH 1MG | capsule   |         | 16       | 8         |
| capsuleIndications:  |  CAPSULE (TOTAL DOSE |           |         |          |           |
| Kidney replaced by   |  = 1.5MG TWICE       |           |         |          |           |
| transplant           | DAILY).              |           |         |          |           |
+----------------------+----------------------+-----------+---------+----------+-----------+
|   tacrolimus         | TAKE (1) CAPSULE     |   60      | 11      | 20 |  |
| (PROGRAF) 1 mg       | TWICE DAILY WITH     | capsule   |         | 16       | 8         |
| capsuleIndications:  | 0.5MG CAPSULE (TOTAL |           |         |          |           |
| Kidney replaced by   |  DOSE = 1.5MG TWICE  |           |         |          |           |
| transplant           | DAILY)               |           |         |          |           |
+----------------------+----------------------+-----------+---------+----------+-----------+
 documented as of this encounter
 
 Plan of Treatment
 
 
+--------+-----------+------------+----------------------+-------------------+
| Date   | Type      | Specialty  | Care Team            | Description       |
+--------+-----------+------------+----------------------+-------------------+
| / | Office    | Cardiology |   Tonia Wilson,    |                   |
|    | Visit     |            | ARNP  [address]   |                   |
|        |           |            | St  [place], [place]  |                   |
|        |           |            | [address]  [phone]  |                   |
|        |           |            |  [phone] (Fax)  |                   |
+--------+-----------+------------+----------------------+-------------------+
 
| / | Hospital  | Radiology  |   Popeye Townsend, |                   |
|    | Encounter |            |  MD  [address] |                   |
|        |           |            |  St.  [place],   |                   |
|        |           |            | [place] [address]             |                   |
|        |           |            | [phone]         |                   |
|        |           |            | [phone] (Fax)   |                   |
+--------+-----------+------------+----------------------+-------------------+
| / | Surgery   | Radiology  |   Popeye Townsend, | CV EP PPM SYSTEM  |
|    |           |            |  MD  [address] | IMPLANT           |
|        |           |            |  St.  [place],   |                   |
|        |           |            | [place] [address]             |                   |
|        |           |            | [phone]         |                   |
|        |           |            | [phone] (Fax)   |                   |
+--------+-----------+------------+----------------------+-------------------+
| 02/10/ | Clinical  | Cardiology |                      |                   |
|    | Support   |            |                      |                   |
+--------+-----------+------------+----------------------+-------------------+
| / | Office    | Cardiology |   Tonia Wilson,    |                   |
|    | Visit     |            | ARNP  [address]   |                   |
|        |           |            | [place]  |                   |
|        |           |            | [address]  [phone]  |                   |
|        |           |            |  [phone] (Fax)  |                   |
+--------+-----------+------------+----------------------+-------------------+
| / | Off-Site  | Nephrology |   Marzena Moser  |                   |
|    | Visit     |            | M, DO  [address]      |                   |
|        |           |            | [address]      |                   |
|        |           |            | [place]      |                   |
|        |           |            | [address]  [phone]  |                   |
|        |           |            |  [phone] (Fax)  |                   |
+--------+-----------+------------+----------------------+-------------------+
 documented as of this encounter
 
 Procedures
 
 
+---------------------+--------+-------------+----------------------+----------------------+
| Procedure Name      | Priori | Date/Time   | Associated Diagnosis | Comments             |
|                     | ty     |             |                      |                      |
+---------------------+--------+-------------+----------------------+----------------------+
| MRI SHOULDER RIGHT  | Routin | 03/10/2017  |   Chronic right      |   Results for this   |
| WO CONTRAST         | e      |  1:11 PM    | shoulder pain        | procedure are in the |
|                     |        | PST         |                      |  results section.    |
+---------------------+--------+-------------+----------------------+----------------------+
 documented in this encounter
 
 Results
 MRI Shoulder Right wo Contrast (03/10/2017  1:11 PM PST)
 
+----------+
| Specimen |
+----------+
|          |
+----------+
 
 
 
+------------------------------------------------------------------------+-----------------+
| Narrative                                                              | Performed At    |
+------------------------------------------------------------------------+-----------------+
|   EXAM: MRI SHOULDER RIGHT WO CONTRAST dated 3/10/2017 12:36 PM        |   [facility]    |
 
| HISTORY:   right shoulder pain- evaluate for rotator cuff tear         | [facility]        |
| COMPARISON: Outside radiographs dated 2016.     TECHNIQUE: | [facility]ICAL [facility]  |
|  Multiplanar multisequence MR imaging of the right shoulder without    | - IMAGING       |
| contrast. Imaging is performed on a 3 [facility] MRI.     FINDINGS:         |                 |
| Rotator Cuff:     There is a near complete tear of the rotator cuff.   |                 |
|   There is a complete  full-thickness tear involving the supraspinatus |                 |
|  tendon.   The fibers are  retracted deep to the acromion.   There are |                 |
|  very heterogeneous, thickened,  irregular.   A significant portion of |                 |
|  the infraspinatus tendon appears similarly  torn and retracted.       |                 |
|   There is a component of the posterior tendon which is  thickened,    |                 |
| irregular, but attached at the humeral insertion.   There is a intact  |                 |
|  teres minor tendon.   There is tendinosis and low to moderate grade   |                 |
| tearing in  the subscapularis tendon.   There is severe atrophy in the |                 |
|  supraspinatus muscle.   There is mild to moderate atrophy in the      |                 |
| infraspinatus muscle.     Labrum and biceps tendon:     Diffuse        |                 |
| degenerative irregularity throughout the remnant portions of the       |                 |
| labrum.   The biceps labral anchor is intact.   There is thickening    |                 |
| and increased signal  throughout the intra-articular and               |                 |
| extra-articular components of the long head  of the biceps tendon.     |                 |
|   The biceps tendon is in place and the biceps groove.                 |                 |
| Glenohumeral and acromioclavicular joints:     Diffuse moderate        |                 |
| degenerative changes throughout the glenohumeral joint.   Moderate     |                 |
| degenerative changes throughout the acromioclavicular joint.   Fluid   |                 |
| in  the subacromial/subdeltoid bursa appears to communicate with the   |                 |
| acromioclavicular joint consistent with capsular disruption.   The     |                 |
| acromion is a  type II.   There is subacromial enthesopathy.           |                 |
| Other:     There is a moderate-sized joint effusion.     IMPRESSION -  |                 |
|     There is a complete tear of the supraspinatus tendon and near      |                 |
| complete of the  infraspinatus tendon.   This is associated with       |                 |
| severe atrophy of the  supraspinatus muscle and mild to moderate       |                 |
| atrophy of the infraspinatus muscle.     Tendinosis and low to         |                 |
| moderate grade tearing of the subscapularis tendon.     Tendinosis     |                 |
| throughout the long head of the biceps tendon.     Moderate            |                 |
| degenerative changes in the glenohumeral joint.     Moderate           |                 |
| degenerative changes in the acromioclavicular joint.   Prominent       |                 |
| subacromial enthesopathy.     Dictated and Signed by: Brooks Zhang, |                 |
|  MD    Electronically signed: 3/10/2017 3:17 PM                        |                 |
+------------------------------------------------------------------------+-----------------+
 
 
 
+------------------------------------------------------------------------------------------+
| Procedure Note                                                                           |
+------------------------------------------------------------------------------------------+
|   Ralf, Rad Results In - 03/10/2017  3:20 PM PST  EXAM: MRI SHOULDER RIGHT WO CONTRAST    |
| dated 3/10/2017 12:36 PMHISTORY:  right shoulder pain- evaluate for rotator cuff         |
| tearCOMPARISON: Outside radiographs dated 2016.TECHNIQUE: Multiplanar        |
| multisequence MR imaging of the right shoulder withoutcontrast. Imaging is performed on  |
| a 3 [facility] MRI.FINDINGS: Rotator Cuff:There is a near complete tear of the rotator cuff.  |
|  There is a completefull-thickness tear involving the supraspinatus tendon.  The fibers  |
| areretracted deep to the acromion.  There are very heterogeneous, thickened,irregular.   |
| A significant portion of the infraspinatus tendon appears similarlytorn and retracted.   |
| There is a component of the posterior tendon which isthickened, irregular, but attached  |
| at the humeral insertion.  There is a intactteres minor tendon.  There is tendinosis and |
|  low to moderate grade tearing inthe subscapularis tendon.  There is severe atrophy in   |
| the supraspinatus muscle. There is mild to moderate atrophy in the infraspinatus         |
| muscle.Labrum and biceps tendon:Diffuse degenerative irregularity throughout the remnant |
|  portions of the labrum. The biceps labral anchor is intact.  There is thickening and    |
| increased signalthroughout the intra-articular and extra-articular components of the     |
| long headof the biceps tendon.  The biceps tendon is in place and the biceps groove.     |
 
| Glenohumeral and acromioclavicular joints:Diffuse moderate degenerative changes          |
| throughout the glenohumeral joint. Moderate degenerative changes throughout the          |
| acromioclavicular joint.  Fluid inthe subacromial/subdeltoid bursa appears to            |
| communicate with theacromioclavicular joint consistent with capsular disruption.  The    |
| acromion is atype II.  There is subacromial enthesopathy.Other:There is a moderate-sized |
|  joint effusion.IMPRESSION -There is a complete tear of the supraspinatus tendon and     |
| near complete of theinfraspinatus tendon.  This is associated with severe atrophy of     |
| thesupraspinatus muscle and mild to moderate atrophy of the infraspinatus                |
| muscle.Tendinosis and low to moderate grade tearing of the subscapularis                 |
| tendon.Tendinosis throughout the long head of the biceps tendon.Moderate degenerative    |
| changes in the glenohumeral joint.Moderate degenerative changes in the acromioclavicular |
|  joint.  Prominentsubacromial enthesopathy.Dictated and Signed by: Brooks Zhang MD   |
| Electronically signed: 3/10/2017 3:17 PM                                                 |
|                                                                                          |
|Glenohumeral and acromioclavicular joints:                                                |
|                                                                                          |
|Diffuse moderate degenerative changes throughout the glenohumeral joint.                  |
|Moderate degenerative changes throughout the acromioclavicular joint.  Fluid in           |
|the subacromial/subdeltoid bursa appears to communicate with the                          |
|acromioclavicular joint consistent with capsular disruption.  The acromion is a           |
|type II.  There is subacromial enthesopathy.                                              |
|                                                                                          |
|Other:                                                                                   |
|                                                                                          |
|There is a moderate-sized joint effusion.                                                 |
|                                                                                          |
|IMPRESSION -                                                                              |
|                                                                                          |
|There is a complete tear of the supraspinatus tendon and near complete of the             |
|infraspinatus tendon.  This is associated with severe atrophy of the                      |
|supraspinatus muscle and mild to moderate atrophy of the infraspinatus muscle.            |
|                                                                                          |
|Tendinosis and low to moderate grade tearing of the subscapularis tendon.                 |
|                                                                                          |
|Tendinosis throughout the long head of the biceps tendon.                                 |
|                                                                                          |
|Moderate degenerative changes in the glenohumeral joint.                                  |
|                                                                                          |
|Moderate degenerative changes in the acromioclavicular joint.  Prominent                  |
|subacromial enthesopathy.                                                                 |
|                                                                                          |
|Dictated and Signed by: Brooks Zhang MD                                               |
| Electronically signed: 3/10/2017 3:17 PM                                                 |
+------------------------------------------------------------------------------------------+
 
 
 
+----------------------+---------------------+--------------------+----------------+
| Performing           | Address             | City/State/Zipcode | Phone Number   |
| Organization         |                     |                    |                |
+----------------------+---------------------+--------------------+----------------+
|   [facility]     |   [address] | [place]    |   [phone] |
| [facility]  |                     | [address]              |                |
| - IMAGING            |                     |                    |                |
+----------------------+---------------------+--------------------+----------------+
 documented in this encounter
 
 Visit Diagnoses
 
 
 
+---------------------------------------------------------------+
| Diagnosis                                                     |
+---------------------------------------------------------------+
|   Chronic right shoulder pain  Pain in joint, shoulder region |
+---------------------------------------------------------------+
 documented in this encounter

## 2020-01-08 NOTE — XMS
Encounter Summary
  Created on: 2020
 
 Viviana Ricci
 External Reference #: [id number]
 : 47
 Sex: Female
 
 Demographics
 
 
+-----------------------+----------------------+
| Address               | [address]      |
|                       | [place]  [address] |
+-----------------------+----------------------+
| Home Phone            | [phone]      |
+-----------------------+----------------------+
| Preferred Language    | Unknown              |
+-----------------------+----------------------+
| Marital Status        | Single               |
+-----------------------+----------------------+
| Jehovah's witness Affiliation | [id number]                 |
+-----------------------+----------------------+
| Race                  | Unknown              |
+-----------------------+----------------------+
| Ethnic Group          | Unknown              |
+-----------------------+----------------------+
 
 
 Author
 
 
+--------------+--------------------------------------------+
| Author       | [facility]  |
|              | and [facility]                                |
+--------------+--------------------------------------------+
| Organization | [facility]  |
|              | and [facility]                                |
+--------------+--------------------------------------------+
| Address      | Unknown                                    |
+--------------+--------------------------------------------+
| Phone        | Unavailable                                |
+--------------+--------------------------------------------+
 
 
 
 Support
 
 
+----------------+--------------+---------------------+-----------------+
| Name           | Relationship | Address             | Phone           |
+----------------+--------------+---------------------+-----------------+
| Ada/Ed Radhames | ECON         | [place]              | [phone] |
|                |              | [place]  |                 |
|                |              |  [address]              |                 |
+----------------+--------------+---------------------+-----------------+
 
 
 
 
 Care Team Providers
 
 
+-----------------------+------+-------------+
| Care Team Member Name | Role | Phone       |
+-----------------------+------+-------------+
 PCP  | Unavailable |
+-----------------------+------+-------------+
 
 
 
 Reason for Visit
 
 
+-------------------+----------+
| Reason            | Comments |
+-------------------+----------+
| Medication Refill |          |
+-------------------+----------+
 
 
 
 Encounter Details
 
 
+--------+--------+---------------------+----------------------+-------------------+
| Date   | Type   | Department          | Care Team            | Description       |
+--------+--------+---------------------+----------------------+-------------------+
| / | Refill |   [facility]         |   Marzena Moser  | Medication Refill |
|    |        | NEPHROLOGY  [address]   | M, DO  [address]      |                   |
|        |        | [address]   | [address]      |                   |
|        |        | [place], [place]     | [place], [place]      |                   |
|        |        | [address]          | [address]  [phone]  |                   |
|        |        | [phone]        |  [phone] (Fax)  |                   |
+--------+--------+---------------------+----------------------+-------------------+
 
 
 
 Social History
 
 
+--------------+-------+-----------+--------+------+
| Tobacco Use  | Types | Packs/Day | Years  | Date |
|              |       |           | Used   |      |
+--------------+-------+-----------+--------+------+
| Never Smoker |       |           |        |      |
+--------------+-------+-----------+--------+------+
 
 
 
+---------------------+---+---+---+
| Smokeless Tobacco:  |   |   |   |
| Never Used          |   |   |   |
+---------------------+---+---+---+
 
 
 
+---------------------------------------------------------------+
| Comments: some second hand smoke exposure, but fairly minimal |
 
+---------------------------------------------------------------+
 
 
 
+-------------+-------------+---------+----------+
| Alcohol Use | Drinks/Week | oz/Week | Comments |
+-------------+-------------+---------+----------+
| No          |             |         |          |
+-------------+-------------+---------+----------+
 
 
 
+------------------+---------------+
| Sex Assigned at  | Date Recorded |
| Birth            |               |
+------------------+---------------+
| Not on file      |               |
+------------------+---------------+
 
 
 
+----------------+-------------+-------------+
| Job Start Date | Occupation  | Industry    |
+----------------+-------------+-------------+
| Not on file    | Not on file | Not on file |
+----------------+-------------+-------------+
 
 
 
+----------------+--------------+------------+
| Travel History | Travel Start | Travel End |
+----------------+--------------+------------+
 
 
 
+-------------------------------------+
| No recent travel history available. |
+-------------------------------------+
 documented as of this encounter
 
 Plan of Treatment
 
 
+--------+-----------+------------+----------------------+-------------------+
| Date   | Type      | Specialty  | Care Team            | Description       |
+--------+-----------+------------+----------------------+-------------------+
| / | Office    | Cardiology |   HellalfredoTonia,    |                   |
|    | Visit     |            | ARNP  [address]   |                   |
|        |           |            | St  [place], [place]  |                   |
|        |           |            | [address]  [phone]  |                   |
|        |           |            |  [phone] (Fax)  |                   |
+--------+-----------+------------+----------------------+-------------------+
| / | Hospital  | Radiology  |   Popeye Townsend, |                   |
|    | Encounter |            |  MD  [address] |                   |
|        |           |            |  St.  [place],   |                   |
|        |           |            | [place] [address]             |                   |
|        |           |            | [phone]         |                   |
|        |           |            | [phone] (Fax)   |                   |
+--------+-----------+------------+----------------------+-------------------+
| / | Surgery   | Radiology  |   Popeye Townsend, | CV EP PPM SYSTEM  |
 
|    |           |            |  MD  [address] | IMPLANT           |
|        |           |            |  St.  [place],   |                   |
|        |           |            | [place] [address]             |                   |
|        |           |            | [phone]         |                   |
|        |           |            | [phone] (Fax)   |                   |
+--------+-----------+------------+----------------------+-------------------+
| 02/10/ | Clinical  | Cardiology |                      |                   |
|    | Support   |            |                      |                   |
+--------+-----------+------------+----------------------+-------------------+
| / | Office    | Cardiology |   Tonia Wilson,    |                   |
|    | Visit     |            | ARNP  [address]   |                   |
|        |           |            | [place]  |                   |
|        |           |            | [address]  [phone]  |                   |
|        |           |            |  [phone] (Fax)  |                   |
+--------+-----------+------------+----------------------+-------------------+
| / | Off-Site  | Nephrology |   Marzena Moser  |                   |
|    | Visit     |            | DO ETIENNE  [address] [place] [address]      |                   |
|        |           |            | [address]      |                   |
|        |           |            | [place]      |                   |
|        |           |            | [address]  [phone]  |                   |
|        |           |            |  [phone] (Fax)  |                   |
+--------+-----------+------------+----------------------+-------------------+
 documented as of this encounter
 
 Visit Diagnoses
 Not on filedocumented in this encounter

## 2020-01-08 NOTE — XMS
Encounter Summary
  Created on: 2020
 
 Viviana Ricci
 External Reference #: [id number]
 : 47
 Sex: Female
 
 Demographics
 
 
+-----------------------+----------------------+
| Address               | [address]      |
|                       | [place]  [address] |
+-----------------------+----------------------+
| Home Phone            | [phone]      |
+-----------------------+----------------------+
| Preferred Language    | Unknown              |
+-----------------------+----------------------+
| Marital Status        | Single               |
+-----------------------+----------------------+
| [employer] Affiliation | [id number]                 |
+-----------------------+----------------------+
| Race                  | Unknown              |
+-----------------------+----------------------+
| Ethnic Group          | Unknown              |
+-----------------------+----------------------+
 
 
 Author
 
 
+--------------+--------------------------------------------+
| Author       | [facility]  |
|              | and [facility]                                |
+--------------+--------------------------------------------+
| Organization | [facility]  |
|              | and [facility]                                |
+--------------+--------------------------------------------+
| Address      | Unknown                                    |
+--------------+--------------------------------------------+
| Phone        | Unavailable                                |
+--------------+--------------------------------------------+
 
 
 
 Support
 
 
+----------------+--------------+---------------------+-----------------+
| Name           | Relationship | Address             | Phone           |
+----------------+--------------+---------------------+-----------------+
| Ada/Ed Radhames | ECON         | [address]              | [phone] |
|                |              | [place]  |                 |
|                |              |  [phone]              |                 |
+----------------+--------------+---------------------+-----------------+
 
 
 
 
 Care Team Providers
 
 
+-----------------------+------+-------------+
| Care Team Member Name | Role | Phone       |
+-----------------------+------+-------------+
 PCP  | Unavailable |
+-----------------------+------+-------------+
 
 
 
 Reason for Visit
 Evaluate & Treat (Routine)
 
+--------+--------+------------+--------------+--------------+---------------+
| Status | Reason | Specialty  | Diagnoses /  | Referred By  | Referred To   |
|        |        |            | Procedures   | Contact      | Contact       |
+--------+--------+------------+--------------+--------------+---------------+
| Closed |        | Nephrology |   Diagnoses  |   Stroemel,  |   Stroemel,   |
|        |        |            |  Unspecified | Marzena CLIFTON,   | Marzena CLIFTON DO |
|        |        |            |              | DO  [address] |   [address]    |
|        |        |            | complication |  [place] | [place]   |
|        |        |            |  of kidney   |  [address]  [place]  | [address]  [place]    |
|        |        |            | transplant   | [place]    | [place]     |
|        |        |            | FSGS (focal  | [address]        | [address]  Phone: |
|        |        |            | segmental    | Phone:       |  [phone] |
|        |        |            | glomeruloscl | [phone] |   Fax:        |
|        |        |            | erosis)      |   Fax:       | [phone]  |
|        |        |            | Hypertension | [phone] |               |
|        |        |            | , essential  |              |               |
|        |        |            |  Procedures  |              |               |
|        |        |            |  [facility] OFFICE   |              |               |
|        |        |            | OUTPATIENT   |              |               |
|        |        |            | VISIT 25     |              |               |
|        |        |            | MINUTES      |              |               |
+--------+--------+------------+--------------+--------------+---------------+
 
 
 
 
 Encounter Details
 
 
+--------+-----------+---------------------+----------------------+----------------------+
| Date   | Type      | Department          | Care Team            | Description          |
+--------+-----------+---------------------+----------------------+----------------------+
| 10/09/ | Off-Site  |   [facility]         |   Marzena Moser  | Kidney replaced by   |
| 2017   | Visit     | NEPHROLOGY  [address]   | M, DO  [address]      | transplant (Primary  |
|        |           | [address]   | [place]      | Dx); Type 2 DM with  |
|        |           | [place], [place]     | [place]      | CKD stage 2 and      |
|        |           | [address]          | [address]  [phone]  | hypertension (HCC);  |
|        |           | [phone]        |  [phone] (Fax)  | Other specified      |
|        |           |                     |                      | hypothyroidism;      |
|        |           |                     |                      | Age-related          |
|        |           |                     |                      | osteoporosis without |
|        |           |                     |                      |  current             |
|        |           |                     |                      | pathological         |
|        |           |                     |                      | fracture             |
+--------+-----------+---------------------+----------------------+----------------------+
 
 
 
 
 Social History
 
 
+--------------+-------+-----------+--------+------+
| Tobacco Use  | Types | Packs/Day | Years  | Date |
|              |       |           | Used   |      |
+--------------+-------+-----------+--------+------+
| Never Smoker |       |           |        |      |
+--------------+-------+-----------+--------+------+
 
 
 
+---------------------+---+---+---+
| Smokeless Tobacco:  |   |   |   |
| Never Used          |   |   |   |
+---------------------+---+---+---+
 
 
 
+---------------------------------------------------------------+
| Comments: some second hand smoke exposure, but fairly minimal |
+---------------------------------------------------------------+
 
 
 
+-------------+-------------+---------+----------+
| Alcohol Use | Drinks/Week | oz/Week | Comments |
+-------------+-------------+---------+----------+
| No          |             |         |          |
+-------------+-------------+---------+----------+
 
 
 
+------------------+---------------+
| Sex Assigned at  | Date Recorded |
| Birth            |               |
+------------------+---------------+
| Not on file      |               |
+------------------+---------------+
 
 
 
+----------------+-------------+-------------+
| Job Start Date | Occupation  | Industry    |
+----------------+-------------+-------------+
| Not on file    | Not on file | Not on file |
+----------------+-------------+-------------+
 
 
 
+----------------+--------------+------------+
| Travel History | Travel Start | Travel End |
+----------------+--------------+------------+
 
 
 
+-------------------------------------+
 
| No recent travel history available. |
+-------------------------------------+
 documented as of this encounter
 
 Last Filed Vital Signs
 
 
+-------------------+---------------------+----------------------+----------+
| Vital Sign        | Reading             | Time Taken           | Comments |
+-------------------+---------------------+----------------------+----------+
| Blood Pressure    | 126/77              | 10/09/2017  4:38 PM  |          |
|                   |                     | PDT                  |          |
+-------------------+---------------------+----------------------+----------+
| Pulse             | -                   | -                    |          |
+-------------------+---------------------+----------------------+----------+
| Temperature       | 36.2   C (97.2   F) | 10/09/2017  4:16 PM  |          |
|                   |                     | PDT                  |          |
+-------------------+---------------------+----------------------+----------+
| Respiratory Rate  | -                   | -                    |          |
+-------------------+---------------------+----------------------+----------+
| Oxygen Saturation | -                   | -                    |          |
+-------------------+---------------------+----------------------+----------+
| Inhaled Oxygen    | -                   | -                    |          |
| Concentration     |                     |                      |          |
+-------------------+---------------------+----------------------+----------+
| Weight            | -                   | -                    |          |
+-------------------+---------------------+----------------------+----------+
| Height            | -                   | -                    |          |
+-------------------+---------------------+----------------------+----------+
| Body Mass Index   | -                   | -                    |          |
+-------------------+---------------------+----------------------+----------+
 documented in this encounter
 
 Progress Notes
 Marzena Moser DO - 10/09/2017  4:00 PM PDTFormatting of this note might be different
 from the original.
 Subjective:  NEPHROLOGY 
  
 Patient ID: Viviana Ricci is a 71 y.o. female.
 
 HPI Comments: 
 Followup for this 70 YO  white female s/p renal allograft, 05, with remote  allograft 
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
demia, SHPTH,  previous low back pain, obesity/JACK, chronic pulmonary  hypertension, histori
izabela related to centripetal obesity, and  CAD, s/p CABG x3 vessels,. 
 
 Viviana presents today for routine longitudinal care of her renal transplant.She has a questio
n about her arm lesions and bruising, and is curious if aspirin could be the cause. It likel
y is the cause, because she is not on any other blood thinners.
 
 She also has some questions about a new diet from a book entitled "The Vedda Blood Sugar Re
medies" which she bought online to help with her diabetes. She is specifically curious about
 taking chromium and Neem tea. 
 
 Lastly, we reviewed lab results. She is reporting adequately in kidney function, tacrolimus
 level, and metabolic markers. All labs are going well, she would like to be tested for madelyn
min D and TSH.
 
 She reports quality sleep, but a tendency to stay up late and sleep for only 6 hours. She a
lso reports some SOB possibly related to seasonal changes.
 
 
 MEDS:
 
 Prograf 1.5 mg, BID
 Mycophenolate 250 mg, BID.
 Prednisone 5 mg, daily.
 
 Current Outpatient Prescriptions: 
    allopurinol (ZYLOPRIM) 100 mg tablet, Take 1 tablet by mouth Daily., Disp: 30 tablet, 
Rfl: 11
    aspirin 81 mg EC tablet, Take 81 mg by mouth Daily., Disp: , Rfl: 
    B-D INS SYRINGE 0.5CC/31GX5/16 31G X 5/16" 0.5 ML MISC, use as directed BEFORE MEALS, 
Disp: 100 each, Rfl: 11
    cholecalciferol (VITAMIN D-3) 2000 UNITS TABS, Take 5,000 Units by mouth Every other d
ay., Disp: , Rfl: 
    cinacalcet (SENSIPAR) 30 mg tablet, Take 1 tablet by mouth Daily., Disp: 30 tablet, Rf
l: 11
    fludrocortisone (FLORINEF) 0.1 mg tablet, Take 1 tablet by mouth Every other day., Dis
p: 90 tablet, Rfl: 4
    furosemide (LASIX) 40 mg tablet, Take 1 tablet by mouth Daily as needed., Disp: 30 tab
let, Rfl: 5
    glucose blood VI test strips (ONE TOUCH ULTRA TEST) strip, Check blood sugar before ea
ch meal and as directed, Disp: 100 each, Rfl: 12
    insulin glargine (LANTUS) 100 units/mL injection (vial), Inject 20 Units under the ski
n every morning., Disp: 10 mL, Rfl: 11
    insulin lispro (HUMALOG) 100 units/mL injection (vial), Inject subcutaneously before m
eals according to sliding scale, Disp: 10 vial, Rfl: 11
    levothyroxine (SYNTHROID) 50 mcg tablet, take 1 tablet by mouth once daily, Disp: 30 t
ablet, Rfl: 11
    lisinopril (PRINIVIL,ZESTRIL) 30 MG tablet, Take 1 tablet by mouth Daily., Disp: 90 ta
blet, Rfl: 3
    loperamide (ANTI-DIARRHEAL) 2 mg capsule, Take 1 capsule by mouth 4 times daily as nee
ded., Disp: 60 capsule, Rfl: 5
    losartan (COZAAR) 50 mg tablet, Take 1 tablet by mouth Daily., Disp: 90 tablet, Rfl: 3
    magnesium oxide (MAG-OX) 400 mg tablet, take 1 tablet by mouth twice a day, Disp: 60 t
ablet, Rfl: 11
    metoprolol tartrate (LOPRESSOR) 25 mg tablet, Take 1 tablet by mouth 2 times daily., D
isp: 60 tablet, Rfl: 11
    mycophenolate (CELLCEPT) 250 mg capsule, TAKE (1) CAPSULE BY MOUTH THREE TIMES DAILY. 
(Patient not taking: Reported on 10/9/2017), Disp: 90 capsule, Rfl: 11
    omeprazole (PRILOSEC) 20 mg capsule, take 1 capsule by mouth once daily ON AN EMPTY ST
OMACH, Disp: 90 capsule, Rfl: 3
    predniSONE (DELTASONE) 5 mg tablet, take 1 tablet by mouth once daily (Patient not anne
ing: Reported on 10/9/2017), Disp: 90 tablet, Rfl: 3
    Prenatal Multivit-Min-Fe-FA (PRENATAL VITAMINS) 0.8 MG TABS, Take 0.8 mg by mouth Mauricio
y., Disp: 30 each, Rfl: 11
    Prenatal Vit-Fe Fumarate-FA (PNV PRENATAL PLUS MULTIVITAMIN) 27-1 MG TABS, , Disp: , R
fl: 1
    Respiratory Therapy Supplies MISC, Decrease CPAP to 12-18 cmH2O Diagnosis Code(s)327.2
3. Please [facility] to [facility] Disp: 1 each, Rfl: 0
    rosuvastatin (CRESTOR) 20 mg tablet, take 1 tablet by mouth NIGHTLY, Disp: 30 tablet, 
Rfl: 11
    tacrolimus (PROGRAF) 0.5 mg capsule, TAKE (1) CAPSULE TWICE DAILY WITH 1MG CAPSULE (TO
COOPER DOSE = 1.5MG TWICE DAILY). (Patient not taking: Reported on 10/9/2017), Disp: 60 capsule
, Rfl: 11
    tacrolimus (PROGRAF) 1 mg capsule, TAKE (1) CAPSULE TWICE DAILY WITH 0.5MG CAPSULE (TO
COOPER DOSE = 1.5MG TWICE DAILY) (Patient not taking: Reported on 10/9/2017), Disp: 60 capsule,
 Rfl: 11
 
 No Known Allergies
 
 
 Objective:  
 Vitals: 
  10/09/17 1638 
 BP: 126/77 
 Temp:  
 
  
 
 Physical Exam
 
 HEENT:  no thrush.
 Heart:  Regular rate and rhythm with no S3, S4, murmur or rub.
 Lungs:  CTA  in all fields.
 Abdomen:  soft, obese, renal allograft in RLQ is nontender, normoactive bowel sounds.
 Extremities:  Trace edema, no clubbing, cyanosis,  no foot ulcers.
 
 Lab Results 
 Component Value Date 
  NAEX 142 2017 
  KEX 4.4 2017 
  CLEX 111 2017 
  CO2EX 19 2017 
  BUNEX 29 (A) 2017 
  CREEX 0.93 2017 
  EGFREX 60 2017 
  GLUEX 110 (A) 2017 
  PHOSEX 2.4 (A) 2017 
  MGEX 1.7 2017 
  PTHEX 193.0 (A) 2016 
  DGV5XCO 7.7 2017 
 
 Lab Results 
 Component Value Date 
  CHOLEX 143 2017 
  HDLEX 50.2 2017 
  LDLEX 55 2017 
  TRIGEX 190 (A) 2017 
 
 Lab Results 
 Component Value Date 
  WBCEX 5.3 2017 
  HGBEX 13.8 2017 
  HCTEX 41.8 2017 
  PLTEX 136 (A) 2017 
 
 Lab Results 
 Component Value Date 
  TACROLIMUSEX 6.6 2017 
 
 Lab Results 
 Component Value Date 
  UAEX negative 2017 
  UAEX normal 2017 
  UAEX negative 2017 
  UAEX 6 2017 
  UAEX 25 2017 
  UAEX 0 2017 
  UAEX 1.014 2017 
  UAEX 25 2017 
 
 
 Assessment: 
 
 1.  Renal Allograft, 05 -- Scr is at baseline.  
 2.  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
 3.  Type 2 DM, requiring insuline-- stable.
 4.  Hyperlipidemia--on statin Rx
 5.  Hypertension-- stable on lisinopril and losartan.
 6.  SHPTH--stable.  
 7.  Obesity/JACK/Pulmonary hypertension-- compensated.
 8.  CAD, s/p CABG, --stable on ASA, metoprolol, atorvastatin.
 9.  Type IV RTA--stable.
 10. Chronic Low Back pain--in remission.
 11.   DJD, left knee--about same.
 
 Plan: 
 
 1.  Order more frequent tacrolimus blood level for new diet she is starting to ensure that 
there is no change in metabolism.
 2. Order TSH and Vitamin D along with next tacrolimus level to monitor those levels.
 3. Follow up on 2018 at 2:00PM - she is hoping to weigh 245 lbs.
 
 Clint Lemons, MS IV
 
 I have personally seen and examined this patient. I have fully participated in the care of 
this patient. I have reviewed and agree with all pertinent clinical information including hi
story, physical exam, labs, radiographic studies and the plan. I have also reviewed and agre
e with the medications, allergies and past medical history sections for this patient. 
 
 Electronically signed by: Marzena Moser DO on 10/9/2017 at 18:16
 
 CC:    Jose David Dalal M.D., [facility], [facility]
            Edgar Sanders MD, [facility] Orthopedics
            [facility]AS[facility]
 
  Electronically signed by Marzena Moser DO at 10/09/2017  6:25 PM PDTdocumented in th
is encounter
 
 Plan of Treatment
 
 
+--------+-----------+------------+----------------------+-------------------+
| Date   | Type      | Specialty  | Care Team            | Description       |
+--------+-----------+------------+----------------------+-------------------+
| / | Office    | Cardiology |   Tonia Wilson,    |                   |
|    | Visit     |            | ARNP  [address]   |                   |
|        |           |            | St  [place], [place]  |                   |
|        |           |            | [address]  [phone]  |                   |
|        |           |            |  [phone] (Fax)  |                   |
+--------+-----------+------------+----------------------+-------------------+
| / | Hospital  | Radiology  |   Popeye Townsend, |                   |
|    | Encounter |            |  MD  [address] |                   |
|        |           |            |  St.  [place],   |                   |
|        |           |            | [place] [address]             |                   |
|        |           |            | [phone]         |                   |
|        |           |            | [phone] (Fax)   |                   |
+--------+-----------+------------+----------------------+-------------------+
| / | Surgery   | Radiology  |   Popeye Townsend, | CV EP PPM SYSTEM  |
|    |           |            |  MD  [address] | IMPLANT           |
|        |           |            |  St.  [place],   |                   |
 
|        |           |            | [place] [address]             |                   |
|        |           |            | [phone]         |                   |
|        |           |            | [phone] (Fax)   |                   |
+--------+-----------+------------+----------------------+-------------------+
| 02/10/ | Clinical  | Cardiology |                      |                   |
|    | Support   |            |                      |                   |
+--------+-----------+------------+----------------------+-------------------+
| / | Office    | Cardiology |   Tonia Wilson,    |                   |
|    | Visit     |            | [facility]  [address]   |                   |
|        |           |            | [place]  |                   |
|        |           |            | [address]  [phone]  |                   |
|        |           |            |  [phone] (Fax)  |                   |
+--------+-----------+------------+----------------------+-------------------+
| / | Off-Site  | Nephrology |   Marzena Moser  |                   |
|    | Visit     |            | DO [facility]  [address] [place] [address]      |                   |
|        |           |            | [address]      |                   |
|        |           |            | [place]      |                   |
|        |           |            | [address]  [phone]  |                   |
|        |           |            |  [phone] (Fax)  |                   |
+--------+-----------+------------+----------------------+-------------------+
 documented as of this encounter
 
 Procedures
 
 
+----------------------+--------+-------------+----------------------+----------------------
+
| Procedure Name       | Priori | Date/Time   | Associated Diagnosis | Comments             
|
|                      | ty     |             |                      |                      
|
+----------------------+--------+-------------+----------------------+----------------------
+
| LABS - EXTERNAL SCAN |        | 2018  |                      |   Results for this   
|
|                      |        | 12:00 AM    |                      | procedure are in the 
|
|                      |        | PDT         |                      |  results section.    
|
+----------------------+--------+-------------+----------------------+----------------------
+
| LABS - EXTERNAL SCAN |        | 2018  |                      |   Results for this   
|
|                      |        | 12:00 AM    |                      | procedure are in the 
|
|                      |        | PDT         |                      |  results section.    
|
+----------------------+--------+-------------+----------------------+----------------------
+
| LABS - EXTERNAL SCAN |        | 2018  |                      |   Results for this   
|
|                      |        | 12:00 AM    |                      | procedure are in the 
|
|                      |        | PDT         |                      |  results section.    
|
+----------------------+--------+-------------+----------------------+----------------------
+
| LABS - EXTERNAL SCAN |        | 2018  |                      |   Results for this   
|
|                      |        | 12:00 AM    |                      | procedure are in the 
 
|
|                      |        | PDT         |                      |  results section.    
|
+----------------------+--------+-------------+----------------------+----------------------
+
 documented in this encounter
 
 Results
 LABS - EXTERNAL SCAN (2018 12:00 AM PDT)
 
+------------------------------------------------------------------------+--------------+
| Narrative                                                              | Performed At |
+------------------------------------------------------------------------+--------------+
|   This result has an attachment that is not available.  Ordered by an  |              |
| unspecified provider.                                                  |              |
+------------------------------------------------------------------------+--------------+
 LABS - EXTERNAL SCAN (2018 12:00 AM PDT)
 
+------------------------------------------------------------------------+--------------+
| Narrative                                                              | Performed At |
+------------------------------------------------------------------------+--------------+
|   This result has an attachment that is not available.  Ordered by an  |              |
| unspecified provider.                                                  |              |
+------------------------------------------------------------------------+--------------+
 LABS - EXTERNAL SCAN (2018 12:00 AM PDT)
 
+------------------------------------------------------------------------+--------------+
| Narrative                                                              | Performed At |
+------------------------------------------------------------------------+--------------+
|   This result has an attachment that is not available.  Ordered by an  |              |
| unspecified provider.                                                  |              |
+------------------------------------------------------------------------+--------------+
 LABS - EXTERNAL SCAN (2018 12:00 AM PDT)
 
+------------------------------------------------------------------------+--------------+
| Narrative                                                              | Performed At |
+------------------------------------------------------------------------+--------------+
|   This result has an attachment that is not available.  Ordered by an  |              |
| unspecified provider.                                                  |              |
+------------------------------------------------------------------------+--------------+
 documented in this encounter
 
 Visit Diagnoses
 
 
+---------------------------------------------------------------------------------------+
| Diagnosis                                                                             |
+---------------------------------------------------------------------------------------+
|   Kidney replaced by transplant - Primary                                             |
+---------------------------------------------------------------------------------------+
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
+---------------------------------------------------------------------------------------+
|   Other specified hypothyroidism                                                      |
+---------------------------------------------------------------------------------------+
|   Age-related osteoporosis without current pathological fracture  Senile osteoporosis |
+---------------------------------------------------------------------------------------+
 documented in this encounter

## 2020-01-08 NOTE — XMS
Encounter Summary
  Created on: 2020
 
 Viviana Ricci
 External Reference #: [id number]
 : 47
 Sex: Female
 
 Demographics
 
 
+-----------------------+----------------------+
| Address               | [address]      |
|                       | [place]  [address] |
+-----------------------+----------------------+
| Home Phone            | [phone]      |
+-----------------------+----------------------+
| Preferred Language    | Unknown              |
+-----------------------+----------------------+
| Marital Status        | Single               |
+-----------------------+----------------------+
| Nondenominational Affiliation | [id number]                 |
+-----------------------+----------------------+
| Race                  | Unknown              |
+-----------------------+----------------------+
| Ethnic Group          | Unknown              |
+-----------------------+----------------------+
 
 
 Author
 
 
+--------------+--------------------------------------------+
| Author       | [facility]  |
|              | and [place]                                |
+--------------+--------------------------------------------+
| Organization | [facility]  |
|              | and [place]                                |
+--------------+--------------------------------------------+
| Address      | Unknown                                    |
+--------------+--------------------------------------------+
| Phone        | Unavailable                                |
+--------------+--------------------------------------------+
 
 
 
 Support
 
 
+----------------+--------------+---------------------+-----------------+
| Name           | Relationship | Address             | Phone           |
+----------------+--------------+---------------------+-----------------+
| Ada/Ed Radhames | ECON         | [address]              | [phone] |
|                |              | [place]  |                 |
|                |              |  [address]              |                 |
+----------------+--------------+---------------------+-----------------+
 
 
 
 
 Care Team Providers
 
 
+------------------------+------+-----------------+
| Care Team Member Name  | Role | Phone           |
+------------------------+------+-----------------+
| Marzena Moser DO | PCP  | [phone] |
+------------------------+------+-----------------+
 
 
 
 Reason for Visit
 
 
+--------+----------+
| Reason | Comments |
+--------+----------+
| Other  |          |
+--------+----------+
 
 
 
 Encounter Details
 
 
+--------+-----------+----------------------+----------------------+-------------+
| Date   | Type      | Department           | Care Team            | Description |
+--------+-----------+----------------------+----------------------+-------------+
| 10/15/ | Telephone |   [facility]          |   Tonia Wilson,    | Other       |
| 2019   |           | CARDIOLOGY  [address]    | ARNP  [address]   |             |
|        |           | [address]  [place], | St  [place], [place]  |             |
|        |           |  [place] [address]       | [address]  [phone]  |             |
|        |           | [phone]         |  [phone] (Fax)  |             |
+--------+-----------+----------------------+----------------------+-------------+
 
 
 
 Social History
 
 
+--------------+-------+-----------+--------+------+
| Tobacco Use  | Types | Packs/Day | Years  | Date |
|              |       |           | Used   |      |
+--------------+-------+-----------+--------+------+
| Never Smoker |       |           |        |      |
+--------------+-------+-----------+--------+------+
 
 
 
+---------------------+---+---+---+
| Smokeless Tobacco:  |   |   |   |
| Never Used          |   |   |   |
+---------------------+---+---+---+
 
 
 
+---------------------------------------------------------------+
| Comments: some second hand smoke exposure, but fairly minimal |
+---------------------------------------------------------------+
 
 
 
 
+-------------+-------------+---------+----------+
| Alcohol Use | Drinks/Week | oz/Week | Comments |
+-------------+-------------+---------+----------+
| No          |             |         |          |
+-------------+-------------+---------+----------+
 
 
 
+------------------+---------------+
| Sex Assigned at  | Date Recorded |
| Birth            |               |
+------------------+---------------+
| Not on file      |               |
+------------------+---------------+
 
 
 
+----------------+-------------+-------------+
| Job Start Date | Occupation  | Industry    |
+----------------+-------------+-------------+
| Not on file    | Not on file | Not on file |
+----------------+-------------+-------------+
 
 
 
+----------------+--------------+------------+
| Travel History | Travel Start | Travel End |
+----------------+--------------+------------+
 
 
 
+-------------------------------------+
| No recent travel history available. |
+-------------------------------------+
 documented as of this encounter
 
 Plan of Treatment
 
 
+--------+-----------+------------+----------------------+-------------------+
| Date   | Type      | Specialty  | Care Team            | Description       |
+--------+-----------+------------+----------------------+-------------------+
| / | Office    | Cardiology |   Tonia Wilson,    |                   |
|    | Visit     |            | ARNP  [address]   |                   |
|        |           |            | St [place], [place]  |                   |
|        |           |            | [address]  [phone]  |                   |
|        |           |            |  [phone] (Fax)  |                   |
+--------+-----------+------------+----------------------+-------------------+
| / | Hospital  | Radiology  |   Popeye Townsend, |                   |
|    | Encounter |            |  MD  [address] |                   |
|        |           |            |  [place],   |                   |
|        |           |            | [place] [address]             |                   |
|        |           |            | [phone]         |                   |
|        |           |            | [phone] (Fax)   |                   |
+--------+-----------+------------+----------------------+-------------------+
| / | Surgery   | Radiology  |   Popeye Townsend, | CV EP PPM SYSTEM  |
|    |           |            |  MD  [address] | IMPLANT           |
 
|        |           |            |  St.  [place],   |                   |
|        |           |            | [place] [address]             |                   |
|        |           |            | [phone]         |                   |
|        |           |            | [phone] (Fax)   |                   |
+--------+-----------+------------+----------------------+-------------------+
| 02/10/ | Clinical  | Cardiology |                      |                   |
|    | Support   |            |                      |                   |
+--------+-----------+------------+----------------------+-------------------+
| / | Office    | Cardiology |   Tonia Wilson,    |                   |
|    | Visit     |            | POP  [address]   |                   |
|        |           |            | [place]  |                   |
|        |           |            | [address]  [phone]  |                   |
|        |           |            |  [phone] (Fax)  |                   |
+--------+-----------+------------+----------------------+-------------------+
| / | Off-Site  | Nephrology |   Marzena Moser  |                   |
|    | Visit     |            | DO ETIENNE  [address] [place] [address]      |                   |
|        |           |            | [address]      |                   |
|        |           |            | [place]      |                   |
|        |           |            | [address] [phone]  |                   |
|        |           |            |  [phone] (Fax)  |                   |
+--------+-----------+------------+----------------------+-------------------+
 documented as of this encounter
 
 Visit Diagnoses
 Not on filedocumented in this encounter

## 2020-01-08 NOTE — XMS
Encounter Summary
  Created on: 2020
 
 Viviana Ricci
 External Reference #: [id number]
 : 47
 Sex: Female
 
 Demographics
 
 
+-----------------------+----------------------+
| Address               | [address]      |
|                       | [place]  [address] |
+-----------------------+----------------------+
| Home Phone            | [phone]      |
+-----------------------+----------------------+
| Preferred Language    | Unknown              |
+-----------------------+----------------------+
| Marital Status        | Single               |
+-----------------------+----------------------+
| Anglican Affiliation | 1009                 |
+-----------------------+----------------------+
| Race                  | Unknown              |
+-----------------------+----------------------+
| Ethnic Group          | Unknown              |
+-----------------------+----------------------+
 
 
 Author
 
 
+--------------+--------------------------------------------+
| Author       | [facility]  |
|              | and [place]                                |
+--------------+--------------------------------------------+
| Organization | [facility]  |
|              | and [place]                                |
+--------------+--------------------------------------------+
| Address      | Unknown                                    |
+--------------+--------------------------------------------+
| Phone        | Unavailable                                |
+--------------+--------------------------------------------+
 
 
 
 Support
 
 
+----------------+--------------+---------------------+-----------------+
| Name           | Relationship | Address             | Phone           |
+----------------+--------------+---------------------+-----------------+
| Ada/Ed Radhames | ECON         | [address]              | [phone] |
|                |              | [place], [place]  |                 |
|                |              |  [address]              |                 |
+----------------+--------------+---------------------+-----------------+
 
 
 
 
 Care Team Providers
 
 
+-----------------------+------+-------------+
| Care Team Member Name | Role | Phone       |
+-----------------------+------+-------------+
 PCP  | Unavailable |
+-----------------------+------+-------------+
 
 
 
 Encounter Details
 
 
+--------+-----------+----------------------+----------------------+-------------+
| Date   | Type      | Department           | Care Team            | Description |
+--------+-----------+----------------------+----------------------+-------------+
| / | [facility]  |   [facility] |   Edgar Sanders,   |             |
|  - | Encounter |  MED CTR GENERIC IP  | MD  [address] [facility]     |             |
|        |           | CONV DEPT  [address]     | [place]      |             |
| / |           | [place], | [address]  [phone]  |             |
|    |           |  [place] [address]       |  [phone] (Fax)  |             |
|        |           | [phone]         |                      |             |
+--------+-----------+----------------------+----------------------+-------------+
 
 
 
 Social History
 
 
+----------------+-------+-----------+--------+------+
| Tobacco Use    | Types | Packs/Day | Years  | Date |
|                |       |           | Used   |      |
+----------------+-------+-----------+--------+------+
| Never Assessed |       |           |        |      |
+----------------+-------+-----------+--------+------+
 
 
 
+------------------+---------------+
| Sex Assigned at  | Date Recorded |
| Birth            |               |
+------------------+---------------+
| Not on file      |               |
+------------------+---------------+
 
 
 
+----------------+-------------+-------------+
| Job Start Date | Occupation  | Industry    |
+----------------+-------------+-------------+
| Not on file    | Not on file | Not on file |
+----------------+-------------+-------------+
 
 
 
+----------------+--------------+------------+
| Travel History | Travel Start | Travel End |
+----------------+--------------+------------+
 
 
 
 
+-------------------------------------+
| No recent travel history available. |
+-------------------------------------+
 documented as of this encounter
 
 Plan of Treatment
 
 
+--------+-----------+------------+----------------------+-------------------+
| Date   | Type      | Specialty  | Care Team            | Description       |
+--------+-----------+------------+----------------------+-------------------+
| / | Office    | Cardiology |   Tonia Wilson,    |                   |
|    | Visit     |            | ARNJESSICA  [address]   |                   |
|        |           |            | St  [place] [facility], [place]  |                   |
|        |           |            | [address]  [phone]  |                   |
|        |           |            |  [phone] (Fax)  |                   |
+--------+-----------+------------+----------------------+-------------------+
| / | Hospital  | Radiology  |   Popeye Townsend, |                   |
|    | Encounter |            |  MD  [address] |                   |
|        |           |            |  [place],   |                   |
|        |           |            | [place] [address]             |                   |
|        |           |            | [phone]         |                   |
|        |           |            | [phone] (Fax)   |                   |
+--------+-----------+------------+----------------------+-------------------+
| / | Surgery   | Radiology  |   Popeye Townsend, | CV EP PPM SYSTEM  |
|    |           |            |  MD  [address] | IMPLANT           |
|        |           |            |  St.  [place],   |                   |
|        |           |            | [place] [address]             |                   |
|        |           |            | [phone]         |                   |
|        |           |            | [phone] (Fax)   |                   |
+--------+-----------+------------+----------------------+-------------------+
| 02/10/ | Clinical  | Cardiology |                      |                   |
|    | Support   |            |                      |                   |
+--------+-----------+------------+----------------------+-------------------+
| / | Office    | Cardiology |   Tonia Wilson,    |                   |
|    | Visit     |            | BELKIS  [address]   |                   |
|        |           |            | [place]  |                   |
|        |           |            | [id number] [phone]  |                   |
|        |           |            |  [phone] (Fax)  |                   |
+--------+-----------+------------+----------------------+-------------------+
| / | Off-Site  | Nephrology |   Marzena Moser  |                   |
|    | Visit     |            | DO ETIENNE  [address] [place] [address]      |                   |
|        |           |            | [address]      |                   |
|        |           |            | [place]      |                   |
|        |           |            | [address] [phone]  |                   |
|        |           |            |  [phone] (Fax)  |                   |
+--------+-----------+------------+----------------------+-------------------+
 documented as of this encounter
 
 Visit Diagnoses
 Not on filedocumented in this encounter

## 2020-01-08 NOTE — XMS
Encounter Summary
  Created on: 2020
 
 Viviana Ricci
 External Reference #: [id number]
 : 47
 Sex: Female
 
 Demographics
 
 
+-----------------------+----------------------+
| Address               | [address]      |
|                       | [place]  [address] |
+-----------------------+----------------------+
| Home Phone            | [phone]      |
+-----------------------+----------------------+
| Preferred Language    | Unknown              |
+-----------------------+----------------------+
| Marital Status        | Single               |
+-----------------------+----------------------+
| Mosque Affiliation | [id number]                 |
+-----------------------+----------------------+
| Race                  | Unknown              |
+-----------------------+----------------------+
| Ethnic Group          | Unknown              |
+-----------------------+----------------------+
 
 
 Author
 
 
+--------------+--------------------------------------------+
| Author       | [facility]  |
|              | and [place]                                |
+--------------+--------------------------------------------+
| Organization | [facility]  |
|              | and [place]                                |
+--------------+--------------------------------------------+
| Address      | Unknown                                    |
+--------------+--------------------------------------------+
| Phone        | Unavailable                                |
+--------------+--------------------------------------------+
 
 
 
 Support
 
 
+----------------+--------------+---------------------+-----------------+
| Name           | Relationship | Address             | Phone           |
+----------------+--------------+---------------------+-----------------+
| Ada/Ed Radhames | ECON         | [place]              | [phone] |
|                |              | [place]  |                 |
|                |              |  [address]              |                 |
+----------------+--------------+---------------------+-----------------+
 
 
 
 
 Care Team Providers
 
 
+-----------------------+------+-------------+
| Care Team Member Name | Role | Phone       |
+-----------------------+------+-------------+
 PCP  | Unavailable |
+-----------------------+------+-------------+
 
 
 
 Encounter Details
 
 
+--------+----------+---------------------+----------------------+-------------+
| Date   | Type     | Department          | Care Team            | Description |
+--------+----------+---------------------+----------------------+-------------+
| / | Abstract |   [facility]  [place]         |   Marzena Moser  |             |
|    |          | NEPHROLOGY  [address]   | M, DO  [address]      |             |
|        |          | [address]   | [address]      |             |
|        |          | [place], [place]     | [place]      |             |
|        |          | [address]          | [address]  [phone]  |             |
|        |          | [phone]        |  [phone] (Fax)  |             |
+--------+----------+---------------------+----------------------+-------------+
 
 
 
 Social History
 
 
+--------------+-------+-----------+--------+------+
| Tobacco Use  | Types | Packs/Day | Years  | Date |
|              |       |           | Used   |      |
+--------------+-------+-----------+--------+------+
| Never Smoker |       |           |        |      |
+--------------+-------+-----------+--------+------+
 
 
 
+---------------------+---+---+---+
| Smokeless Tobacco:  |   |   |   |
| Never Used          |   |   |   |
+---------------------+---+---+---+
 
 
 
+-------------+-------------+---------+----------+
| Alcohol Use | Drinks/Week | oz/Week | Comments |
+-------------+-------------+---------+----------+
| No          |             |         |          |
+-------------+-------------+---------+----------+
 
 
 
+------------------+---------------+
| Sex Assigned at  | Date Recorded |
| Birth            |               |
+------------------+---------------+
| Not on file      |               |
 
+------------------+---------------+
 
 
 
+----------------+-------------+-------------+
| Job Start Date | Occupation  | Industry    |
+----------------+-------------+-------------+
| Not on file    | Not on file | Not on file |
+----------------+-------------+-------------+
 
 
 
+----------------+--------------+------------+
| Travel History | Travel Start | Travel End |
+----------------+--------------+------------+
 
 
 
+-------------------------------------+
| No recent travel history available. |
+-------------------------------------+
 documented as of this encounter
 
 Plan of Treatment
 
 
+--------+-----------+------------+----------------------+-------------------+
| Date   | Type      | Specialty  | Care Team            | Description       |
+--------+-----------+------------+----------------------+-------------------+
| / | Office    | Cardiology |   Tonia Wilson,    |                   |
|    | Visit     |            | ARNP  [address]   |                   |
|        |           |            | [place]  |                   |
|        |           |            | [address] [phone]  |                   |
|        |           |            |  [phone] (Fax)  |                   |
+--------+-----------+------------+----------------------+-------------------+
| / | Hospital  | Radiology  |   Popeye Townsend, |                   |
|    | Encounter |            |  MD  [address] |                   |
|        |           |            |  [place]   |                   |
|        |           |            | [place] [address]             |                   |
|        |           |            | [phone]         |                   |
|        |           |            | [phone] (Fax)   |                   |
+--------+-----------+------------+----------------------+-------------------+
| / | Surgery   | Radiology  |   CaryYinmarissa, | CV EP PPM SYSTEM  |
2020   |           |            |  MD  [address] | IMPLANT           |
|        |           |            |  [place],   |                   |
|        |           |            | [place] [address]             |                   |
|        |           |            | [phone]         |                   |
|        |           |            | [phone] (Fax)   |                   |
+--------+-----------+------------+----------------------+-------------------+
| 02/10/ | Clinical  | Cardiology |                      |                   |
|    | Support   |            |                      |                   |
+--------+-----------+------------+----------------------+-------------------+
| / | Office    | Cardiology |   Tonia Wilson,    |                   |
2020   | Visit     |            | BELKIS  [address]   |                   |
|        |           |            |   [place]  |                   |
|        |           |            | [address]  [phone]  |                   |
|        |           |            |  [phone] (Fax)  |                   |
+--------+-----------+------------+----------------------+-------------------+
| / | Off-Site  | Nephrology |   Marzena Moser  |                   |
2020   | Visit     |            | DO [address]      |                   |
 
|        |           |            | [address]      |                   |
|        |           |            | [place]      |                   |
|        |           |            | [address]  [phone]  |                   |
|        |           |            |  [phone] (Fax)  |                   |
+--------+-----------+------------+----------------------+-------------------+
 documented as of this encounter
 
 Procedures
 
 
+----------------------+--------+------------+----------------------+----------------------+
| Procedure Name       | Priori | Date/Time  | Associated Diagnosis | Comments             |
|                      | ty     |            |                      |                      |
+----------------------+--------+------------+----------------------+----------------------+
| CMP14+LP+CBC/D/PLT+T | Routin | 2012 |                      |   Results for this   |
| SH+UA/M (NON ORD)    | e      |            |                      | procedure are in the |
|                      |        |            |                      |  results section.    |
+----------------------+--------+------------+----------------------+----------------------+
 documented in this encounter
 
 Results
 CMP14+LP+CBC/D/Plt+TSH+UA/M (2012)
 
+-------------+-----------+-----------------+------------+--------------+
| Component   | Value     | Ref Range       | Performed  | Pathologist  |
|             |           |                 | At         | Signature    |
+-------------+-----------+-----------------+------------+--------------+
| Phosphorus  | 2.7       | 2.6 - 4.4 mg/dL |            |              |
+-------------+-----------+-----------------+------------+--------------+
| Cholesterol | 166       | mg/dL           |            |              |
+-------------+-----------+-----------------+------------+--------------+
| Triglycerid | 230       |                 |            |              |
| es          |           |                 |            |              |
+-------------+-----------+-----------------+------------+--------------+
| HDL         | 43        | mg/dL           |            |              |
+-------------+-----------+-----------------+------------+--------------+
| LDL         | 77        |                 |            |              |
| Cholesterol |           |                 |            |              |
+-------------+-----------+-----------------+------------+--------------+
| Na          | 141       | mmol/L          |            |              |
+-------------+-----------+-----------------+------------+--------------+
| K           | 5.1       | mmol/L          |            |              |
+-------------+-----------+-----------------+------------+--------------+
| Cl          | 113       | mmol/L          |            |              |
+-------------+-----------+-----------------+------------+--------------+
| Glucose     | 140       | mg/dL           |            |              |
+-------------+-----------+-----------------+------------+--------------+
| BUN         | 33        | mg/dL           |            |              |
+-------------+-----------+-----------------+------------+--------------+
| CREA        | 1.1       | mg/dL           |            |              |
+-------------+-----------+-----------------+------------+--------------+
| Estimated   | 48.0      | mL/min/1.73m2   |            |              |
| GFR         |           |                 |            |              |
+-------------+-----------+-----------------+------------+--------------+
| Calcium     | 9.8       | mg/dL           |            |              |
+-------------+-----------+-----------------+------------+--------------+
| AST         | 24        | 5 - 40 U/L      |            |              |
+-------------+-----------+-----------------+------------+--------------+
| ALT         | 16        | U/L             |            |              |
+-------------+-----------+-----------------+------------+--------------+
 
| Alkaline    | 74        | 38 - 110 U/L    |            |              |
| Phosphatase |           |                 |            |              |
+-------------+-----------+-----------------+------------+--------------+
| Bilirubin   | 0.5       | 0.1 - 1.5 mg/dL |            |              |
| Total       |           |                 |            |              |
+-------------+-----------+-----------------+------------+--------------+
| Albumin     | 3.9       | 3.3 - 4.8 g/dL  |            |              |
+-------------+-----------+-----------------+------------+--------------+
| Magnesium   | 1.7       | mg/dL           |            |              |
+-------------+-----------+-----------------+------------+--------------+
| Hemoglobin  | 8.1       |                 |            |              |
| A1c         |           |                 |            |              |
+-------------+-----------+-----------------+------------+--------------+
| WBC         | 3.1       | K/uL            |            |              |
+-------------+-----------+-----------------+------------+--------------+
| Hemoglobin  | 12.8      | 11.6 - 15.5     |            |              |
|             |           | g/dL            |            |              |
+-------------+-----------+-----------------+------------+--------------+
| Hematocrit  | 38.7      | 35.0 - 46.0 %   |            |              |
+-------------+-----------+-----------------+------------+--------------+
| MCV         | 100.5 (A) | 80.0 - 98.0 fL  |            |              |
+-------------+-----------+-----------------+------------+--------------+
| Platelet    | 177       | 150 - 400 K/uL  |            |              |
| Count       |           |                 |            |              |
+-------------+-----------+-----------------+------------+--------------+
| PTH Intact  | 208       | pg/mL           |            |              |
+-------------+-----------+-----------------+------------+--------------+
| Tacrolimus  | 7.3       |                 |            |              |
| Level       |           |                 |            |              |
+-------------+-----------+-----------------+------------+--------------+
 
 
 
+----------+
| Specimen |
+----------+
|          |
+----------+
 documented in this encounter
 
 Visit Diagnoses
 Not on filedocumented in this encounter

## 2020-01-08 NOTE — XMS
Encounter Summary
  Created on: 2020
 
 Viviana Ricci
 External Reference #: [id number]
 : 47
 Sex: Female
 
 Demographics
 
 
+-----------------------+----------------------+
| Address               | [address]      |
|                       | [place]  [address] |
+-----------------------+----------------------+
| Home Phone            | [phone]      |
+-----------------------+----------------------+
| Preferred Language    | Unknown              |
+-----------------------+----------------------+
| Marital Status        | Single               |
+-----------------------+----------------------+
| Holiness Affiliation | 1009                 |
+-----------------------+----------------------+
| Race                  | Unknown              |
+-----------------------+----------------------+
| Ethnic Group          | Unknown              |
+-----------------------+----------------------+
 
 
 Author
 
 
+--------------+--------------------------------------------+
| Author       | [facility]  |
|              | and [place]                                |
+--------------+--------------------------------------------+
| Organization | [facility]  |
|              | and [place]                                |
+--------------+--------------------------------------------+
| Address      | Unknown                                    |
+--------------+--------------------------------------------+
| Phone        | Unavailable                                |
+--------------+--------------------------------------------+
 
 
 
 Support
 
 
+----------------+--------------+---------------------+-----------------+
| Name           | Relationship | Address             | Phone           |
+----------------+--------------+---------------------+-----------------+
| Ada/Ed Radhames | ECON         | [place]              | [phone] |
|                |              | [place]  |                 |
|                |              |  [address]              |                 |
+----------------+--------------+---------------------+-----------------+
 
 
 
 
 Care Team Providers
 
 
+-----------------------+------+-------------+
| Care Team Member Name | Role | Phone       |
+-----------------------+------+-------------+
 PCP  | Unavailable |
+-----------------------+------+-------------+
 
 
 
 Reason for Visit
 
 
+-------------------+----------+
| Reason            | Comments |
+-------------------+----------+
| Medication Refill |          |
+-------------------+----------+
 
 
 
 Encounter Details
 
 
+--------+--------+---------------------+----------------------+-------------------+
| Date   | Type   | Department          | Care Team            | Description       |
+--------+--------+---------------------+----------------------+-------------------+
| / | Refill |   [place]         |   Marzena Moser  | Medication Refill |
|    |        | NEPHROLOGY  [address]   | M, DO  [address]      |                   |
|        |        | [address]   | [address]      |                   |
|        |        | [place], [place]     | [place], [place]      |                   |
|        |        | [address]          | [address]  [phone]  |                   |
|        |        | [phone]        |  [phone] (Fax)  |                   |
+--------+--------+---------------------+----------------------+-------------------+
 
 
 
 Social History
 
 
+--------------+-------+-----------+--------+------+
| Tobacco Use  | Types | Packs/Day | Years  | Date |
|              |       |           | Used   |      |
+--------------+-------+-----------+--------+------+
| Never Smoker |       |           |        |      |
+--------------+-------+-----------+--------+------+
 
 
 
+---------------------+---+---+---+
| Smokeless Tobacco:  |   |   |   |
| Never Used          |   |   |   |
+---------------------+---+---+---+
 
 
 
+---------------------------------------------------------------+
| Comments: some second hand smoke exposure, but fairly minimal |
 
+---------------------------------------------------------------+
 
 
 
+-------------+-------------+---------+----------+
| Alcohol Use | Drinks/Week | oz/Week | Comments |
+-------------+-------------+---------+----------+
| No          |             |         |          |
+-------------+-------------+---------+----------+
 
 
 
+------------------+---------------+
| Sex Assigned at  | Date Recorded |
| Birth            |               |
+------------------+---------------+
| Not on file      |               |
+------------------+---------------+
 
 
 
+----------------+-------------+-------------+
| Job Start Date | Occupation  | Industry    |
+----------------+-------------+-------------+
| Not on file    | Not on file | Not on file |
+----------------+-------------+-------------+
 
 
 
+----------------+--------------+------------+
| Travel History | Travel Start | Travel End |
+----------------+--------------+------------+
 
 
 
+-------------------------------------+
| No recent travel history available. |
+-------------------------------------+
 documented as of this encounter
 
 Plan of Treatment
 
 
+--------+-----------+------------+----------------------+-------------------+
| Date   | Type      | Specialty  | Care Team            | Description       |
+--------+-----------+------------+----------------------+-------------------+
| / | Office    | Cardiology |   HellalfredoTonia,    |                   |
|    | Visit     |            | ARNP  [address]   |                   |
|        |           |            | St  [place], [place]  |                   |
|        |           |            | [address]  [phone]  |                   |
|        |           |            |  [phone] (Fax)  |                   |
+--------+-----------+------------+----------------------+-------------------+
| / | Hospital  | Radiology  |   Popeye Townsend, |                   |
|    | Encounter |            |  MD  [address] |                   |
|        |           |            |  St.  [place],   |                   |
|        |           |            | [place] [address]             |                   |
|        |           |            | [phone]         |                   |
|        |           |            | [phone] (Fax)   |                   |
+--------+-----------+------------+----------------------+-------------------+
| / | Surgery   | Radiology  |   Popeye Townsend, | CV EP PPM SYSTEM  |
 
|    |           |            |  MD  [address] | IMPLANT           |
|        |           |            |  St.  [place],   |                   |
|        |           |            | [place] [address]             |                   |
|        |           |            | [phone]         |                   |
|        |           |            | [phone] (Fax)   |                   |
+--------+-----------+------------+----------------------+-------------------+
| 02/10/ | Clinical  | Cardiology |                      |                   |
|    | Support   |            |                      |                   |
+--------+-----------+------------+----------------------+-------------------+
| / | Office    | Cardiology |   Tonia Wilson,    |                   |
|    | Visit     |            | ARNP  [address]   |                   |
|        |           |            | [place]  |                   |
|        |           |            | [address]  [phone]  |                   |
|        |           |            |  [phone] (Fax)  |                   |
+--------+-----------+------------+----------------------+-------------------+
| / | Off-Site  | Nephrology |   Marzena Moser  |                   |
|    | Visit     |            | DO ETIENNE  [address] [place] [address]      |                   |
|        |           |            | [address]      |                   |
|        |           |            | [place]      |                   |
|        |           |            | [address]  [phone]  |                   |
|        |           |            |  [phone] (Fax)  |                   |
+--------+-----------+------------+----------------------+-------------------+
 documented as of this encounter
 
 Visit Diagnoses
 
 
+------------------------------------------------------------------------------------------+
| Diagnosis                                                                                |
+------------------------------------------------------------------------------------------+
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
| stage IV, or unspecified(403.90) - Primary  Unspecified hypertensive kidney disease with |
|  chronic kidney disease stage I through stage IV, or unspecified                         |
+------------------------------------------------------------------------------------------+
|   Complications of transplanted kidney                                                   |
+------------------------------------------------------------------------------------------+
|   DIABETES MELLITUS, TYPE II, UNCONTROLLED  Type II or unspecified type diabetes         |
| mellitus without mention of complication, uncontrolled                                   |
+------------------------------------------------------------------------------------------+
|   Hyperlipidemia  Other and unspecified hyperlipidemia                                   |
+------------------------------------------------------------------------------------------+
 documented in this encounter

## 2020-01-08 NOTE — XMS
Encounter Summary
  Created on: 2020
 
 Viviana Ricci
 External Reference #: [id number]
 : 47
 Sex: Female
 
 Demographics
 
 
+-----------------------+----------------------+
| Address               | [address]      |
|                       | [place]  [address] |
+-----------------------+----------------------+
| Home Phone            | [phone]      |
+-----------------------+----------------------+
| Preferred Language    | Unknown              |
+-----------------------+----------------------+
| Marital Status        | Single               |
+-----------------------+----------------------+
| [employer] Affiliation | [id number]                 |
+-----------------------+----------------------+
| Race                  | Unknown              |
+-----------------------+----------------------+
| Ethnic Group          | Unknown              |
+-----------------------+----------------------+
 
 
 Author
 
 
+--------------+--------------------------------------------+
| Author       | [facility]  |
|              | and [place]                                |
+--------------+--------------------------------------------+
| Organization | [facility]  |
|              | and [place]                                |
+--------------+--------------------------------------------+
| Address      | Unknown                                    |
+--------------+--------------------------------------------+
| Phone        | Unavailable                                |
+--------------+--------------------------------------------+
 
 
 
 Support
 
 
+----------------+--------------+---------------------+-----------------+
| Name           | Relationship | Address             | Phone           |
+----------------+--------------+---------------------+-----------------+
| Ada/Ed Radhames | ECON         | [place]              | [phone] |
|                |              | [place]  |                 |
|                |              |  [address]              |                 |
+----------------+--------------+---------------------+-----------------+
 
 
 
 
 Care Team Providers
 
 
+-----------------------+------+-------------+
| Care Team Member Name | Role | Phone       |
+-----------------------+------+-------------+
 PCP  | Unavailable |
+-----------------------+------+-------------+
 
 
 
 Reason for Visit
 
 
+-------------------+----------+
| Reason            | Comments |
+-------------------+----------+
| Medication Refill |          |
+-------------------+----------+
 
 
 
 Encounter Details
 
 
+--------+--------+---------------------+----------------------+-------------------+
| Date   | Type   | Department          | Care Team            | Description       |
+--------+--------+---------------------+----------------------+-------------------+
| / | Refill |   [facility]         |   Marzena Moser  | Medication Refill |
|    |        | NEPHROLOGY  [address]   | M, DO  [address]      |                   |
|        |        | [address]   | [address]      |                   |
|        |        | [place], [place]     | [place], [place]      |                   |
|        |        | [address]          | [address]  [phone]  |                   |
|        |        | [phone]        |  [phone] (Fax)  |                   |
+--------+--------+---------------------+----------------------+-------------------+
 
 
 
 Social History
 
 
+--------------+-------+-----------+--------+------+
| Tobacco Use  | Types | Packs/Day | Years  | Date |
|              |       |           | Used   |      |
+--------------+-------+-----------+--------+------+
| Never Smoker |       |           |        |      |
+--------------+-------+-----------+--------+------+
 
 
 
+---------------------+---+---+---+
| Smokeless Tobacco:  |   |   |   |
| Never Used          |   |   |   |
+---------------------+---+---+---+
 
 
 
+---------------------------------------------------------------+
| Comments: some second hand smoke exposure, but fairly minimal |
 
+---------------------------------------------------------------+
 
 
 
+-------------+-------------+---------+----------+
| Alcohol Use | Drinks/Week | oz/Week | Comments |
+-------------+-------------+---------+----------+
| No          |             |         |          |
+-------------+-------------+---------+----------+
 
 
 
+------------------+---------------+
| Sex Assigned at  | Date Recorded |
| Birth            |               |
+------------------+---------------+
| Not on file      |               |
+------------------+---------------+
 
 
 
+----------------+-------------+-------------+
| Job Start Date | Occupation  | Industry    |
+----------------+-------------+-------------+
| Not on file    | Not on file | Not on file |
+----------------+-------------+-------------+
 
 
 
+----------------+--------------+------------+
| Travel History | Travel Start | Travel End |
+----------------+--------------+------------+
 
 
 
+-------------------------------------+
| No recent travel history available. |
+-------------------------------------+
 documented as of this encounter
 
 Plan of Treatment
 
 
+--------+-----------+------------+----------------------+-------------------+
| Date   | Type      | Specialty  | Care Team            | Description       |
+--------+-----------+------------+----------------------+-------------------+
| / | Office    | Cardiology |   HellalfredoTonia,    |                   |
|    | Visit     |            | ARNP  [address]   |                   |
|        |           |            | St  [place], [place]  |                   |
|        |           |            | [address]  [phone]  |                   |
|        |           |            |  [phone] (Fax)  |                   |
+--------+-----------+------------+----------------------+-------------------+
| / | Hospital  | Radiology  |   Popeye Townsend, |                   |
|    | Encounter |            |  MD  [address] |                   |
|        |           |            |  St.  [place],   |                   |
|        |           |            | [place] [address]             |                   |
|        |           |            | [phone]         |                   |
|        |           |            | [phone] (Fax)   |                   |
+--------+-----------+------------+----------------------+-------------------+
| / | Surgery   | Radiology  |   Popeye Townsend, | CV EP PPM SYSTEM  |
 
|    |           |            |  MD  [address] | IMPLANT           |
|        |           |            |  St.  [place],   |                   |
|        |           |            | [place] [address]             |                   |
|        |           |            | [phone]         |                   |
|        |           |            | [phone] (Fax)   |                   |
+--------+-----------+------------+----------------------+-------------------+
| 02/10/ | Clinical  | Cardiology |                      |                   |
|    | Support   |            |                      |                   |
+--------+-----------+------------+----------------------+-------------------+
| / | Office    | Cardiology |   Tonia Wilson,    |                   |
|    | Visit     |            | ARNP  [address]   |                   |
|        |           |            | [place]  |                   |
|        |           |            | [address]  [phone]  |                   |
|        |           |            |  [phone] (Fax)  |                   |
+--------+-----------+------------+----------------------+-------------------+
| / | Off-Site  | Nephrology |   Marzena Moser  |                   |
|    | Visit     |            | DO ETIENNE  [address] [place] [address]      |                   |
|        |           |            | [address]      |                   |
|        |           |            | [place]      |                   |
|        |           |            | [address]  [phone]  |                   |
|        |           |            |  [phone] (Fax)  |                   |
+--------+-----------+------------+----------------------+-------------------+
 documented as of this encounter
 
 Visit Diagnoses
 Not on filedocumented in this encounter

## 2020-01-08 NOTE — XMS
Encounter Summary
  Created on: 2020
 
 Viviana Ricci
 External Reference #: [id number]
 : 47
 Sex: Female
 
 Demographics
 
 
+-----------------------+----------------------+
| Address               | [address]      |
|                       | [place]  [address] |
+-----------------------+----------------------+
| Home Phone            | [phone]      |
+-----------------------+----------------------+
| Preferred Language    | Unknown              |
+-----------------------+----------------------+
| Marital Status        | Single               |
+-----------------------+----------------------+
| [employer] Affiliation | [id number]                 |
+-----------------------+----------------------+
| Race                  | Unknown              |
+-----------------------+----------------------+
| Ethnic Group          | Unknown              |
+-----------------------+----------------------+
 
 
 Author
 
 
+--------------+--------------------------------------------+
| Author       | [facility]  |
|              | and [place]                                |
+--------------+--------------------------------------------+
| Organization | [facility]  |
|              | and [place]                                |
+--------------+--------------------------------------------+
| Address      | Unknown                                    |
+--------------+--------------------------------------------+
| Phone        | Unavailable                                |
+--------------+--------------------------------------------+
 
 
 
 Support
 
 
+----------------+--------------+---------------------+-----------------+
| Name           | Relationship | Address             | Phone           |
+----------------+--------------+---------------------+-----------------+
| Ada/Ed Radhames | ECON         | [address]              | [phone] |
|                |              | [place]  |                 |
|                |              |  [address]              |                 |
+----------------+--------------+---------------------+-----------------+
 
 
 
 
 Care Team Providers
 
 
+-----------------------+------+-------------+
| Care Team Member Name | Role | Phone       |
+-----------------------+------+-------------+
 PCP  | Unavailable |
+-----------------------+------+-------------+
 
 
 
 Encounter Details
 
 
+--------+-----------+----------------------+----------------------+-------------+
| Date   | Type      | Department           | Care Team            | Description |
+--------+-----------+----------------------+----------------------+-------------+
| / | [facility]  |   [facility] |   Marzena Moser  |             |
|    | Encounter |  MED CTR XRAY  [address] | M, DO  [address]      |             |
|        |           |  [place]       | [address]      |             |
|        |           | [place] [address] | [place]      |             |
|        |           |   [phone]       | [address] [phone]  |             |
|        |           |                      |  [phone] (Fax)  |             |
+--------+-----------+----------------------+----------------------+-------------+
 
 
 
 Social History
 
 
+----------------+-------+-----------+--------+------+
| Tobacco Use    | Types | Packs/Day | Years  | Date |
|                |       |           | Used   |      |
+----------------+-------+-----------+--------+------+
| Never Assessed |       |           |        |      |
+----------------+-------+-----------+--------+------+
 
 
 
+------------------+---------------+
| Sex Assigned at  | Date Recorded |
| Birth            |               |
+------------------+---------------+
| Not on file      |               |
+------------------+---------------+
 
 
 
+----------------+-------------+-------------+
| Job Start Date | Occupation  | Industry    |
+----------------+-------------+-------------+
| Not on file    | Not on file | Not on file |
+----------------+-------------+-------------+
 
 
 
+----------------+--------------+------------+
| Travel History | Travel Start | Travel End |
+----------------+--------------+------------+
 
 
 
 
+-------------------------------------+
| No recent travel history available. |
+-------------------------------------+
 documented as of this encounter
 
 Plan of Treatment
 
 
+--------+-----------+------------+----------------------+-------------------+
| Date   | Type      | Specialty  | Care Team            | Description       |
+--------+-----------+------------+----------------------+-------------------+
| / | Office    | Cardiology |   Tonia Wilson,    |                   |
|    | Visit     |            | ARNJESSICA  [address]   |                   |
|        |           |            | St  [place], [place]  |                   |
|        |           |            | [address]  [phone]  |                   |
|        |           |            |  [phone] (Fax)  |                   |
+--------+-----------+------------+----------------------+-------------------+
| / | Hospital  | Radiology  |   Popeye Townsend, |                   |
|    | Encounter |            |  MD  [address] |                   |
|        |           |            |  St. [place],   |                   |
|        |           |            | [place] [address]             |                   |
|        |           |            | [phone]         |                   |
|        |           |            | [phone] (Fax)   |                   |
+--------+-----------+------------+----------------------+-------------------+
| / | Surgery   | Radiology  |   Popeye Townsend, | CV EP PPM SYSTEM  |
|    |           |            |  MD  [address] | IMPLANT           |
|        |           |            |  [place],   |                   |
|        |           |            | [place] [address]             |                   |
|        |           |            | [phone]         |                   |
|        |           |            | [phone] (Fax)   |                   |
+--------+-----------+------------+----------------------+-------------------+
| 02/10/ | Clinical  | Cardiology |                      |                   |
|    | Support   |            |                      |                   |
+--------+-----------+------------+----------------------+-------------------+
| / | Office    | Cardiology |   Tonia Wilson,    |                   |
|    | Visit     |            | BELKIS  [address]   |                   |
|        |           |            | [place]  |                   |
|        |           |            | [address]  [phone]  |                   |
|        |           |            |  [phone] (Fax)  |                   |
+--------+-----------+------------+----------------------+-------------------+
| / | Off-Site  | Nephrology |   Marzena Moser  |                   |
|    | Visit     |            | DO ETIENNE  [address] [place] [address]      |                   |
|        |           |            | [address]      |                   |
|        |           |            | [place]      |                   |
|        |           |            | [address] [phone]  |                   |
|        |           |            |  [phone] (Fax)  |                   |
+--------+-----------+------------+----------------------+-------------------+
 documented as of this encounter
 
 Visit Diagnoses
 Not on filedocumented in this encounter

## 2020-01-08 NOTE — XMS
Encounter Summary
  Created on: 2020
 
 Viviana Ricci
 External Reference #: [id number]
 : 47
 Sex: Female
 
 Demographics
 
 
+-----------------------+----------------------+
| Address               | [address]      |
|                       | [place]  [address] |
+-----------------------+----------------------+
| Home Phone            | [phone]      |
+-----------------------+----------------------+
| Preferred Language    | Unknown              |
+-----------------------+----------------------+
| Marital Status        | Single               |
+-----------------------+----------------------+
| Yarsani Affiliation | [id number]                 |
+-----------------------+----------------------+
| Race                  | Unknown              |
+-----------------------+----------------------+
| Ethnic Group          | Unknown              |
+-----------------------+----------------------+
 
 
 Author
 
 
+--------------+--------------------------------------------+
| Author       | [facility]  |
|              | and [place]                                |
+--------------+--------------------------------------------+
| Organization | [facility]  |
|              | and [place]                                |
+--------------+--------------------------------------------+
| Address      | Unknown                                    |
+--------------+--------------------------------------------+
| Phone        | Unavailable                                |
+--------------+--------------------------------------------+
 
 
 
 Support
 
 
+----------------+--------------+---------------------+-----------------+
| Name           | Relationship | Address             | Phone           |
+----------------+--------------+---------------------+-----------------+
| Ada/Ed Radhames | ECON         | [place]              | [phone] |
|                |              | [place]  |                 |
|                |              |  [id number]              |                 |
+----------------+--------------+---------------------+-----------------+
 
 
 
 
 Care Team Providers
 
 
+-----------------------+------+-------------+
| Care Team Member Name | Role | Phone       |
+-----------------------+------+-------------+
 PCP  | Unavailable |
+-----------------------+------+-------------+
 
 
 
 Reason for Visit
 
 
+-------------------+----------+
| Reason            | Comments |
+-------------------+----------+
| Medication Refill |          |
+-------------------+----------+
 
 
 
 Encounter Details
 
 
+--------+--------+---------------------+----------------------+-------------------+
| Date   | Type   | Department          | Care Team            | Description       |
+--------+--------+---------------------+----------------------+-------------------+
| / | Refill |   [facility]         |   Marzena Moser  | Medication Refill |
|    |        | NEPHROLOGY  [address]   | M, DO  [address]      |                   |
|        |        | [address]   | [address]      |                   |
|        |        | [place], [place]     | [place], [place]      |                   |
|        |        | [address]          | [address]  [phone]  |                   |
|        |        | [phone]        |  [phone] (Fax)  |                   |
+--------+--------+---------------------+----------------------+-------------------+
 
 
 
 Social History
 
 
+--------------+-------+-----------+--------+------+
| Tobacco Use  | Types | Packs/Day | Years  | Date |
|              |       |           | Used   |      |
+--------------+-------+-----------+--------+------+
| Never Smoker |       |           |        |      |
+--------------+-------+-----------+--------+------+
 
 
 
+---------------------+---+---+---+
| Smokeless Tobacco:  |   |   |   |
| Never Used          |   |   |   |
+---------------------+---+---+---+
 
 
 
+---------------------------------------------------------------+
| Comments: some second hand smoke exposure, but fairly minimal |
 
+---------------------------------------------------------------+
 
 
 
+-------------+-------------+---------+----------+
| Alcohol Use | Drinks/Week | oz/Week | Comments |
+-------------+-------------+---------+----------+
| No          |             |         |          |
+-------------+-------------+---------+----------+
 
 
 
+------------------+---------------+
| Sex Assigned at  | Date Recorded |
| Birth            |               |
+------------------+---------------+
| Not on file      |               |
+------------------+---------------+
 
 
 
+----------------+-------------+-------------+
| Job Start Date | Occupation  | Industry    |
+----------------+-------------+-------------+
| Not on file    | Not on file | Not on file |
+----------------+-------------+-------------+
 
 
 
+----------------+--------------+------------+
| Travel History | Travel Start | Travel End |
+----------------+--------------+------------+
 
 
 
+-------------------------------------+
| No recent travel history available. |
+-------------------------------------+
 documented as of this encounter
 
 Plan of Treatment
 
 
+--------+-----------+------------+----------------------+-------------------+
| Date   | Type      | Specialty  | Care Team            | Description       |
+--------+-----------+------------+----------------------+-------------------+
| / | Office    | Cardiology |   HellalfredoTonia,    |                   |
|    | Visit     |            | ARNP  [address]   |                   |
|        |           |            | St  [place], [place]  |                   |
|        |           |            | [address]  [phone]  |                   |
|        |           |            |  [phone] (Fax)  |                   |
+--------+-----------+------------+----------------------+-------------------+
| / | Hospital  | Radiology  |   Popeye Townsend, |                   |
|    | Encounter |            |  MD  [address] |                   |
|        |           |            |  St.  [place],   |                   |
|        |           |            | [place] [address]             |                   |
|        |           |            | [phone]         |                   |
|        |           |            | [phone] (Fax)   |                   |
+--------+-----------+------------+----------------------+-------------------+
| / | Surgery   | Radiology  |   Popeye Townsend, | CV EP PPM SYSTEM  |
 
|    |           |            |  MD  [address] | IMPLANT           |
|        |           |            |  St.  [place],   |                   |
|        |           |            | [place] [address]             |                   |
|        |           |            | [phone]         |                   |
|        |           |            | [phone] (Fax)   |                   |
+--------+-----------+------------+----------------------+-------------------+
| 02/10/ | Clinical  | Cardiology |                      |                   |
|    | Support   |            |                      |                   |
+--------+-----------+------------+----------------------+-------------------+
| / | Office    | Cardiology |   Tonia Wilson,    |                   |
|    | Visit     |            | ARNP  [address]   |                   |
|        |           |            | [place]  |                   |
|        |           |            | [address]  [phone]  |                   |
|        |           |            |  [phone] (Fax)  |                   |
+--------+-----------+------------+----------------------+-------------------+
| / | Off-Site  | Nephrology |   Marzena Moser  |                   |
|    | Visit     |            | DO ETIENNE  [address] [place] [address]      |                   |
|        |           |            | [address]      |                   |
|        |           |            | [place]      |                   |
|        |           |            | [address]  [phone]  |                   |
|        |           |            |  [phone] (Fax)  |                   |
+--------+-----------+------------+----------------------+-------------------+
 documented as of this encounter
 
 Visit Diagnoses
 Not on filedocumented in this encounter

## 2020-01-08 NOTE — XMS
Encounter Summary
  Created on: 2020
 
 Viviana Ricci
 External Reference #: [id number]
 : 47
 Sex: Female
 
 Demographics
 
 
+-----------------------+----------------------+
| Address               | [address]      |
|                       | [place]  [address] |
+-----------------------+----------------------+
| Home Phone            | [phone]      |
+-----------------------+----------------------+
| Preferred Language    | Unknown              |
+-----------------------+----------------------+
| Marital Status        | Single               |
+-----------------------+----------------------+
| Congregation Affiliation | 1009                 |
+-----------------------+----------------------+
| Race                  | Unknown              |
+-----------------------+----------------------+
| Ethnic Group          | Unknown              |
+-----------------------+----------------------+
 
 
 Author
 
 
+--------------+--------------------------------------------+
| Author       | [facility]  |
|              | and [place]                                |
+--------------+--------------------------------------------+
| Organization | [facility]  |
|              | and [place]                                |
+--------------+--------------------------------------------+
| Address      | Unknown                                    |
+--------------+--------------------------------------------+
| Phone        | Unavailable                                |
+--------------+--------------------------------------------+
 
 
 
 Support
 
 
+----------------+--------------+---------------------+-----------------+
| Name           | Relationship | Address             | Phone           |
+----------------+--------------+---------------------+-----------------+
| Ada/Ed Radhames | ECON         | [place]              | [phone] |
|                |              | [place]  |                 |
|                |              |  [address]              |                 |
+----------------+--------------+---------------------+-----------------+
 
 
 
 
 Care Team Providers
 
 
+-----------------------+------+-------------+
| Care Team Member Name | Role | Phone       |
+-----------------------+------+-------------+
 PCP  | Unavailable |
+-----------------------+------+-------------+
 
 
 
 Reason for Visit
 
 
+-------------------+----------+
| Reason            | Comments |
+-------------------+----------+
| Medication Refill |          |
+-------------------+----------+
 
 
 
 Encounter Details
 
 
+--------+--------+---------------------+----------------------+-------------------+
| Date   | Type   | Department          | Care Team            | Description       |
+--------+--------+---------------------+----------------------+-------------------+
| / | Refill |   [facility]         |   Marzena Moser  | Medication Refill |
|    |        | NEPHROLOGY  [address]   | M, DO  [address]      |                   |
|        |        | [address]   | [address]      |                   |
|        |        | [place], [place]     | [place], [place]      |                   |
|        |        | [address]          | [address]  [phone]  |                   |
|        |        | [phone]        |  [phone] (Fax)  |                   |
+--------+--------+---------------------+----------------------+-------------------+
 
 
 
 Social History
 
 
+--------------+-------+-----------+--------+------+
| Tobacco Use  | Types | Packs/Day | Years  | Date |
|              |       |           | Used   |      |
+--------------+-------+-----------+--------+------+
| Never Smoker |       |           |        |      |
+--------------+-------+-----------+--------+------+
 
 
 
+---------------------+---+---+---+
| Smokeless Tobacco:  |   |   |   |
| Never Used          |   |   |   |
+---------------------+---+---+---+
 
 
 
+---------------------------------------------------------------+
| Comments: some second hand smoke exposure, but fairly minimal |
 
+---------------------------------------------------------------+
 
 
 
+-------------+-------------+---------+----------+
| Alcohol Use | Drinks/Week | oz/Week | Comments |
+-------------+-------------+---------+----------+
| No          |             |         |          |
+-------------+-------------+---------+----------+
 
 
 
+------------------+---------------+
| Sex Assigned at  | Date Recorded |
| Birth            |               |
+------------------+---------------+
| Not on file      |               |
+------------------+---------------+
 
 
 
+----------------+-------------+-------------+
| Job Start Date | Occupation  | Industry    |
+----------------+-------------+-------------+
| Not on file    | Not on file | Not on file |
+----------------+-------------+-------------+
 
 
 
+----------------+--------------+------------+
| Travel History | Travel Start | Travel End |
+----------------+--------------+------------+
 
 
 
+-------------------------------------+
| No recent travel history available. |
+-------------------------------------+
 documented as of this encounter
 
 Plan of Treatment
 
 
+--------+-----------+------------+----------------------+-------------------+
| Date   | Type      | Specialty  | Care Team            | Description       |
+--------+-----------+------------+----------------------+-------------------+
| / | Office    | Cardiology |   HellalfredoTonia,    |                   |
|    | Visit     |            | ARNP  [address]   |                   |
|        |           |            | St  [place], [place]  |                   |
|        |           |            | [address]  [phone]  |                   |
|        |           |            |  [phone] (Fax)  |                   |
+--------+-----------+------------+----------------------+-------------------+
| / | Hospital  | Radiology  |   Popeye Townsend, |                   |
|    | Encounter |            |  MD  [address] |                   |
|        |           |            |  St.  [place],   |                   |
|        |           |            | [place] [address]             |                   |
|        |           |            | [phone]         |                   |
|        |           |            | [phone] (Fax)   |                   |
+--------+-----------+------------+----------------------+-------------------+
| / | Surgery   | Radiology  |   Popeye Townsend, | CV EP PPM SYSTEM  |
 
|    |           |            |  MD  [address] | IMPLANT           |
|        |           |            |  St.  [place],   |                   |
|        |           |            | [place] [address]             |                   |
|        |           |            | [phone]         |                   |
|        |           |            | [phone] (Fax)   |                   |
+--------+-----------+------------+----------------------+-------------------+
| 02/10/ | Clinical  | Cardiology |                      |                   |
|    | Support   |            |                      |                   |
+--------+-----------+------------+----------------------+-------------------+
| / | Office    | Cardiology |   Tonia Wilson,    |                   |
|    | Visit     |            | ARNP  [address]   |                   |
|        |           |            | [place]  |                   |
|        |           |            | [address]  [phone]  |                   |
|        |           |            |  [phone] (Fax)  |                   |
+--------+-----------+------------+----------------------+-------------------+
| / | Off-Site  | Nephrology |   Marzena Moser  |                   |
|    | Visit     |            | DO ETIENNE  [address] [place] [address]      |                   |
|        |           |            | [address]      |                   |
|        |           |            | [place]      |                   |
|        |           |            | [address]  [phone]  |                   |
|        |           |            |  [phone] (Fax)  |                   |
+--------+-----------+------------+----------------------+-------------------+
 documented as of this encounter
 
 Visit Diagnoses
 Not on filedocumented in this encounter

## 2020-01-08 NOTE — XMS
Encounter Summary
  Created on: 2020
 
 Viviana Ricci
 External Reference #: [id number]
 : 47
 Sex: Female
 
 Demographics
 
 
+-----------------------+----------------------+
| Address               | [address]      |
|                       | [place]  [address] |
+-----------------------+----------------------+
| Home Phone            | [phone]      |
+-----------------------+----------------------+
| Preferred Language    | Unknown              |
+-----------------------+----------------------+
| Marital Status        | Single               |
+-----------------------+----------------------+
| [employer] Affiliation | 1009                 |
+-----------------------+----------------------+
| Race                  | Unknown              |
+-----------------------+----------------------+
| Ethnic Group          | Unknown              |
+-----------------------+----------------------+
 
 
 Author
 
 
+--------------+--------------------------------------------+
| Author       | [facility]  |
|              | and [place]                                |
+--------------+--------------------------------------------+
| Organization | [facility]  |
|              | and [place]                                |
+--------------+--------------------------------------------+
| Address      | Unknown                                    |
+--------------+--------------------------------------------+
| Phone        | Unavailable                                |
+--------------+--------------------------------------------+
 
 
 
 Support
 
 
+----------------+--------------+---------------------+-----------------+
| Name           | Relationship | Address             | Phone           |
+----------------+--------------+---------------------+-----------------+
| Ada/Ed Radhames | ECON         | [place]              | [phone] |
|                |              | [place]  |                 |
|                |              |  [address]              |                 |
+----------------+--------------+---------------------+-----------------+
 
 
 
 
 Care Team Providers
 
 
+-----------------------+------+-------------+
| Care Team Member Name | Role | Phone       |
+-----------------------+------+-------------+
 PCP  | Unavailable |
+-----------------------+------+-------------+
 
 
 
 Reason for Visit
 
 
+-------------------+----------+
| Reason            | Comments |
+-------------------+----------+
| Medication Refill |          |
+-------------------+----------+
 
 
 
 Encounter Details
 
 
+--------+--------+---------------------+----------------------+-------------------+
| Date   | Type   | Department          | Care Team            | Description       |
+--------+--------+---------------------+----------------------+-------------------+
| / | Refill |   [facility]         |   Marzena Moser  | Medication Refill |
|    |        | NEPHROLOGY  [address]   | M, DO  [address]      |                   |
|        |        | [address]   | [address]      |                   |
|        |        | [place], [place]     | [place], [place]      |                   |
|        |        | [address]          | [address]  [phone]  |                   |
|        |        | [phone]        |  [phone] (Fax)  |                   |
+--------+--------+---------------------+----------------------+-------------------+
 
 
 
 Social History
 
 
+--------------+-------+-----------+--------+------+
| Tobacco Use  | Types | Packs/Day | Years  | Date |
|              |       |           | Used   |      |
+--------------+-------+-----------+--------+------+
| Never Smoker |       |           |        |      |
+--------------+-------+-----------+--------+------+
 
 
 
+---------------------+---+---+---+
| Smokeless Tobacco:  |   |   |   |
| Never Used          |   |   |   |
+---------------------+---+---+---+
 
 
 
+---------------------------------------------------------------+
| Comments: some second hand smoke exposure, but fairly minimal |
 
+---------------------------------------------------------------+
 
 
 
+-------------+-------------+---------+----------+
| Alcohol Use | Drinks/Week | oz/Week | Comments |
+-------------+-------------+---------+----------+
| No          |             |         |          |
+-------------+-------------+---------+----------+
 
 
 
+------------------+---------------+
| Sex Assigned at  | Date Recorded |
| Birth            |               |
+------------------+---------------+
| Not on file      |               |
+------------------+---------------+
 
 
 
+----------------+-------------+-------------+
| Job Start Date | Occupation  | Industry    |
+----------------+-------------+-------------+
| Not on file    | Not on file | Not on file |
+----------------+-------------+-------------+
 
 
 
+----------------+--------------+------------+
| Travel History | Travel Start | Travel End |
+----------------+--------------+------------+
 
 
 
+-------------------------------------+
| No recent travel history available. |
+-------------------------------------+
 documented as of this encounter
 
 Plan of Treatment
 
 
+--------+-----------+------------+----------------------+-------------------+
| Date   | Type      | Specialty  | Care Team            | Description       |
+--------+-----------+------------+----------------------+-------------------+
| / | Office    | Cardiology |   HellalfredoTonia,    |                   |
|    | Visit     |            | ARNP  [address]   |                   |
|        |           |            | St  [place], [place]  |                   |
|        |           |            | [address]  [phone]  |                   |
|        |           |            |  [phone] (Fax)  |                   |
+--------+-----------+------------+----------------------+-------------------+
| / | Hospital  | Radiology  |   Popeye Townsend, |                   |
|    | Encounter |            |  MD  [address] |                   |
|        |           |            |  St.  [place],   |                   |
|        |           |            | [place] [address]             |                   |
|        |           |            | [phone]         |                   |
|        |           |            | [phone] (Fax)   |                   |
+--------+-----------+------------+----------------------+-------------------+
| / | Surgery   | Radiology  |   Popeye Townsend, | CV EP PPM SYSTEM  |
 
|    |           |            |  MD  [address] | IMPLANT           |
|        |           |            |  St.  [place],   |                   |
|        |           |            | [place] [address]             |                   |
|        |           |            | [phone]         |                   |
|        |           |            | [phone] (Fax)   |                   |
+--------+-----------+------------+----------------------+-------------------+
| 02/10/ | Clinical  | Cardiology |                      |                   |
|    | Support   |            |                      |                   |
+--------+-----------+------------+----------------------+-------------------+
| / | Office    | Cardiology |   Tonia Wilson,    |                   |
|    | Visit     |            | ARNP  [address]   |                   |
|        |           |            | [place]  |                   |
|        |           |            | [address]  [phone]  |                   |
|        |           |            |  [phone] (Fax)  |                   |
+--------+-----------+------------+----------------------+-------------------+
| / | Off-Site  | Nephrology |   Marzena Moser  |                   |
|    | Visit     |            | DO ETIENNE  [address] [place] [address]      |                   |
|        |           |            | [address]      |                   |
|        |           |            | [place]      |                   |
|        |           |            | [address]  [phone]  |                   |
|        |           |            |  [phone] (Fax)  |                   |
+--------+-----------+------------+----------------------+-------------------+
 documented as of this encounter
 
 Visit Diagnoses
 Not on filedocumented in this encounter

## 2020-01-08 NOTE — XMS
Encounter Summary
  Created on: 2020
 
 Viviana Ricci
 External Reference #: [id number]
 : 47
 Sex: Female
 
 Demographics
 
 
+-----------------------+----------------------+
| Address               | [address]      |
|                       | [place]  [address] |
+-----------------------+----------------------+
| Home Phone            | [phone]      |
+-----------------------+----------------------+
| Preferred Language    | Unknown              |
+-----------------------+----------------------+
| Marital Status        | Single               |
+-----------------------+----------------------+
| [employer] Affiliation | 1009                 |
+-----------------------+----------------------+
| Race                  | Unknown              |
+-----------------------+----------------------+
| Ethnic Group          | Unknown              |
+-----------------------+----------------------+
 
 
 Author
 
 
+--------------+--------------------------------------------+
| Author       | [facility]  |
|              | and [place]                                |
+--------------+--------------------------------------------+
| Organization | [facility]  |
|              | and [place]                                |
+--------------+--------------------------------------------+
| Address      | Unknown                                    |
+--------------+--------------------------------------------+
| Phone        | Unavailable                                |
+--------------+--------------------------------------------+
 
 
 
 Support
 
 
+----------------+--------------+---------------------+-----------------+
| Name           | Relationship | Address             | Phone           |
+----------------+--------------+---------------------+-----------------+
| Ada/Ed Radhames | ECON         | [address]              | [phone] |
|                |              | [place]  |                 |
|                |              |  [address]              |                 |
+----------------+--------------+---------------------+-----------------+
 
 
 
 
 Care Team Providers
 
 
+-----------------------+------+-------------+
| Care Team Member Name | Role | Phone       |
+-----------------------+------+-------------+
 PCP  | Unavailable |
+-----------------------+------+-------------+
 
 
 
 Reason for Visit
 
 
+--------------+----------+
| Reason       | Comments |
+--------------+----------+
| Leg Swelling | Left     |
+--------------+----------+
 
 
 
 Encounter Details
 
 
+--------+-----------+---------------------+----------------------+---------------------+
| Date   | Type      | Department          | Care Team            | Description         |
+--------+-----------+---------------------+----------------------+---------------------+
| / | Telephone |   [facility]         |   Marzena Moser  | Leg Swelling (Left) |
|    |           | NEPHROLOGY  [address]   | M, DO  [address]      |                     |
|        |           | [address]   | [address]      |                     |
|        |           | [place], [place]     | [place], [place]      |                     |
|        |           | [address]          | [address]  [phone]  |                     |
|        |           | [phone]        |  [phone] (Fax)  |                     |
+--------+-----------+---------------------+----------------------+---------------------+
 
 
 
 Social History
 
 
+--------------+-------+-----------+--------+------+
| Tobacco Use  | Types | Packs/Day | Years  | Date |
|              |       |           | Used   |      |
+--------------+-------+-----------+--------+------+
| Never Smoker |       |           |        |      |
+--------------+-------+-----------+--------+------+
 
 
 
+---------------------+---+---+---+
| Smokeless Tobacco:  |   |   |   |
| Never Used          |   |   |   |
+---------------------+---+---+---+
 
 
 
+---------------------------------------------------------------+
| Comments: some second hand smoke exposure, but fairly minimal |
 
+---------------------------------------------------------------+
 
 
 
+-------------+-------------+---------+----------+
| Alcohol Use | Drinks/Week | oz/Week | Comments |
+-------------+-------------+---------+----------+
| No          |             |         |          |
+-------------+-------------+---------+----------+
 
 
 
+------------------+---------------+
| Sex Assigned at  | Date Recorded |
| Birth            |               |
+------------------+---------------+
| Not on file      |               |
+------------------+---------------+
 
 
 
+----------------+-------------+-------------+
| Job Start Date | Occupation  | Industry    |
+----------------+-------------+-------------+
| Not on file    | Not on file | Not on file |
+----------------+-------------+-------------+
 
 
 
+----------------+--------------+------------+
| Travel History | Travel Start | Travel End |
+----------------+--------------+------------+
 
 
 
+-------------------------------------+
| No recent travel history available. |
+-------------------------------------+
 documented as of this encounter
 
 Plan of Treatment
 
 
+--------+-----------+------------+----------------------+-------------------+
| Date   | Type      | Specialty  | Care Team            | Description       |
+--------+-----------+------------+----------------------+-------------------+
| / | Office    | Cardiology |   HelmaxxalfredoTonia,    |                   |
|    | Visit     |            | ARNJESSICA  [address]   |                   |
|        |           |            | St  [place], [place]  |                   |
|        |           |            | [address]  [phone]  |                   |
|        |           |            |  [phone] (Fax)  |                   |
+--------+-----------+------------+----------------------+-------------------+
| / | Hospital  | Radiology  |   Popeye Townsend, |                   |
|    | Encounter |            |  MD  [address] |                   |
|        |           |            |  St.  [place],   |                   |
|        |           |            | [place] [address]             |                   |
|        |           |            | [phone]         |                   |
|        |           |            | [phone] (Fax)   |                   |
+--------+-----------+------------+----------------------+-------------------+
| / | Surgery   | Radiology  |   Popeye Townsend, | CV EP PPM SYSTEM  |
 
|    |           |            |  MD  [address] | IMPLANT           |
|        |           |            |  St.  [place],   |                   |
|        |           |            | [place] [address]             |                   |
|        |           |            | [phone]         |                   |
|        |           |            | [phone] (Fax)   |                   |
+--------+-----------+------------+----------------------+-------------------+
| 02/10/ | Clinical  | Cardiology |                      |                   |
|    | Support   |            |                      |                   |
+--------+-----------+------------+----------------------+-------------------+
| / | Office    | Cardiology |   Tonia Wilson,    |                   |
|    | Visit     |            | ARNP  [address]   |                   |
|        |           |            | [place]  |                   |
|        |           |            | [address]  [phone]  |                   |
|        |           |            |  [phone] (Fax)  |                   |
+--------+-----------+------------+----------------------+-------------------+
| / | Off-Site  | Nephrology |   Marzena Moser  |                   |
|    | Visit     |            | DO ETIENNE  [facility]      |                   |
|        |           |            | [address]      |                   |
|        |           |            | [place]      |                   |
|        |           |            | [address]  [phone]  |                   |
|        |           |            |  [phone] (Fax)  |                   |
+--------+-----------+------------+----------------------+-------------------+
 documented as of this encounter
 
 Visit Diagnoses
 Not on filedocumented in this encounter

## 2020-01-08 NOTE — XMS
Encounter Summary
  Created on: 2020
 
 Viviana Ricci
 External Reference #: [id number]
 : 47
 Sex: Female
 
 Demographics
 
 
+-----------------------+----------------------+
| Address               | [address]      |
|                       | [place]  [address] |
+-----------------------+----------------------+
| Home Phone            | [phone]      |
+-----------------------+----------------------+
| Preferred Language    | Unknown              |
+-----------------------+----------------------+
| Marital Status        | Single               |
+-----------------------+----------------------+
| Hinduism Affiliation | [id number]                 |
+-----------------------+----------------------+
| Race                  | Unknown              |
+-----------------------+----------------------+
| Ethnic Group          | Unknown              |
+-----------------------+----------------------+
 
 
 Author
 
 
+--------------+--------------------------------------------+
| Author       | [facility]  |
|              | and [facility]                                |
+--------------+--------------------------------------------+
| Organization | [facility]  |
|              | and [facility]                                |
+--------------+--------------------------------------------+
| Address      | Unknown                                    |
+--------------+--------------------------------------------+
| Phone        | Unavailable                                |
+--------------+--------------------------------------------+
 
 
 
 Support
 
 
+----------------+--------------+---------------------+-----------------+
| Name           | Relationship | Address             | Phone           |
+----------------+--------------+---------------------+-----------------+
| Ada/Ed Radhames | ECON         | [address]              | [phone] |
|                |              | [place]  |                 |
|                |              |  [address]              |                 |
+----------------+--------------+---------------------+-----------------+
 
 
 
 
 Care Team Providers
 
 
+-----------------------+------+-------------+
| Care Team Member Name | Role | Phone       |
+-----------------------+------+-------------+
 PCP  | Unavailable |
+-----------------------+------+-------------+
 
 
 
 Reason for Visit
 
 
+------------------+------------------------+
| Reason           | Comments               |
+------------------+------------------------+
| Follow-up        | Pulmonary Hypertension |
+------------------+------------------------+
| Coronary Artery  |                        |
| Disease          |                        |
+------------------+------------------------+
| Hypertension     |                        |
+------------------+------------------------+
| Hyperlipidemia   |                        |
+------------------+------------------------+
 
 
 
 Encounter Details
 
 
+--------+---------+----------------------+----------------------+----------------------+
| Date   | Type    | Department           | Care Team            | Description          |
+--------+---------+----------------------+----------------------+----------------------+
| / | Office  |   [facility]          |   Abebe Townsend, | Murmur (Primary Dx); |
|    | Visit   | CARDIOLOGY  [address]    |  MD  [address] |  Other               |
|        |         | [address]  [place], |  [address]  [place],   | hyperlipidemia;      |
|        |         |  [place] [address]       | [place] [address]             | Coronary artery      |
|        |         | [phone]         | [phone]         | disease involving    |
|        |         |                      | [phone] (Fax)   | native artery of     |
|        |         |                      | Tonia Wilson ARNP | transplanted heart   |
|        |         |                      |   [address]    | without angina       |
|        |         |                      | [place], [place]      | pectoris;            |
|        |         |                      | [address]  [phone]  | Hypertension,        |
|        |         |                      |  [phone] (Fax)  | essential            |
+--------+---------+----------------------+----------------------+----------------------+
 
 
 
 Social History
 
 
+--------------+-------+-----------+--------+------+
| Tobacco Use  | Types | Packs/Day | Years  | Date |
|              |       |           | Used   |      |
+--------------+-------+-----------+--------+------+
| Never Smoker |       |           |        |      |
+--------------+-------+-----------+--------+------+
 
 
 
 
+---------------------+---+---+---+
| Smokeless Tobacco:  |   |   |   |
| Never Used          |   |   |   |
+---------------------+---+---+---+
 
 
 
+---------------------------------------------------------------+
| Comments: some second hand smoke exposure, but fairly minimal |
+---------------------------------------------------------------+
 
 
 
+-------------+-------------+---------+----------+
| Alcohol Use | Drinks/Week | oz/Week | Comments |
+-------------+-------------+---------+----------+
| No          |             |         |          |
+-------------+-------------+---------+----------+
 
 
 
+------------------+---------------+
| Sex Assigned at  | Date Recorded |
| Birth            |               |
+------------------+---------------+
| Not on file      |               |
+------------------+---------------+
 
 
 
+----------------+-------------+-------------+
| Job Start Date | Occupation  | Industry    |
+----------------+-------------+-------------+
| Not on file    | Not on file | Not on file |
+----------------+-------------+-------------+
 
 
 
+----------------+--------------+------------+
| Travel History | Travel Start | Travel End |
+----------------+--------------+------------+
 
 
 
+-------------------------------------+
| No recent travel history available. |
+-------------------------------------+
 documented as of this encounter
 
 Last Filed Vital Signs
 
 
+-------------------+-------------------+----------------------+-----------+
| Vital Sign        | Reading           | Time Taken           | Comments  |
+-------------------+-------------------+----------------------+-----------+
| Blood Pressure    | 130/76            | 2015  3:02 PM  |           |
|                   |                   | PST                  |           |
 
+-------------------+-------------------+----------------------+-----------+
| Pulse             | 64                | 2015  3:02 PM  | irregular |
|                   |                   | PST                  |           |
+-------------------+-------------------+----------------------+-----------+
| Temperature       | -                 | -                    |           |
+-------------------+-------------------+----------------------+-----------+
| Respiratory Rate  | 18                | 2015  3:02 PM  |           |
|                   |                   | PST                  |           |
+-------------------+-------------------+----------------------+-----------+
| Oxygen Saturation | -                 | -                    |           |
+-------------------+-------------------+----------------------+-----------+
| Inhaled Oxygen    | -                 | -                    |           |
| Concentration     |                   |                      |           |
+-------------------+-------------------+----------------------+-----------+
| Weight            | 124.3 kg (274 lb) | 2015  3:02 PM  |           |
|                   |                   | PST                  |           |
+-------------------+-------------------+----------------------+-----------+
| Height            | 167.6 cm (5' 6")  | 2015  3:02 PM  |           |
|                   |                   | PST                  |           |
+-------------------+-------------------+----------------------+-----------+
| Body Mass Index   | 44.22             | 2015  3:02 PM  |           |
|                   |                   | PST                  |           |
+-------------------+-------------------+----------------------+-----------+
 documented in this encounter
 
 Progress Notes
 Abebe Townsend MD - 2015  3:14 PM PSTFormatting of this note might be different f
rom the original.
   
 
 PATIENT NAME:  Viviana Ricci  
 : 1945:         AGE: 70 y.o.
  PRIMARY CARE:
 Marzena Moser DO 
 
 OUTPATIENT FOLLOW UP VISIT
 
 Date of Service: 2015 
 
 HISTORY OF PRESENT ILLNESS:
 Viviana Ricci is a 70 y.o. female with a history of CAD post CABG x 3 in , history of 
diabetes, renal failure post a renal transplantation, morbid obesity, inactivity, hypertensi
on, hyperlipidemia, pulmonary hypertension and severe arthritis.  She is being seen today fo
r follow up coronary artery disease.
 
 She was last seen 14 at which time she had no changes in her medical regimen.  Since 
that time, she had a DVT 2015 and was started on Eliquis by a provider in [facility], and 
the patient is not sure how long she is going to be on it. She has had a fair energy level. 
 She has not been very active.  She enjoys volunteering in her [employer], visiting with friends
 and family in her spare time.  She has not had any chest pain or discomfort at rest or with
 exertion.   She has had shortness of breath with exertion of walking into the [employer] when t
he air is cold.  She has not had any lightheadedness or dizziness.  She has not noticed palp
itations.  She has noticed that leg swelling has worsened since last visit and especially af
ter her DVT in January.  She is able to sleep laying down at night without any symptoms of s
hortness of breath.  She sleeps with CPAP machine in place nightly. 
 
 MEDICAL, SURGICAL, AND PERSONAL HISTORY
 Past Medical, Surgical, Family, and Social History are reviewed in EPIC.
 
 CURRENT PROBLEMS
 
 Patient Active Problem List 
 Diagnosis 
   Chronic low back pain 
   Hypothyroidism 
   Hyperlipidemia 
   Complications of transplanted kidney 
   Movement disorder 
   Diabetes mellitus type II, uncontrolled  
   Pulmonary hypertension  
   Coronary artery disease involving native coronary artery without angina pectoris 
   JACK on CPAP 
   Obesity hypoventilation syndrome 
   Kidney replaced by transplant 
   Secondary hyperparathyroidism 
   Dyspnea 
   FSGS (focal segmental glomerulosclerosis) 
   Murmur 
   Cardiomegaly 
   Hypertension, essential 
   PLMD (periodic limb movement disorder) 
   Chest pain 
   UTI (lower urinary tract infection) 
 
 CURRENT MEDICATIONS
 Current Outpatient Prescriptions 
 Medication Sig Dispense Refill 
   allopurinol (ZYLOPRIM) 100 mg tablet Take 1 tablet by mouth Daily. 30 tablet 11 
   apixaban (ELIQUIS) 2.5 mg tablet Take 1 tablet by mouth 2 times daily. 60 tablet 6 
   cholecalciferol (VITAMIN D-3) 2000 UNITS TABS Take 5,000 Units by mouth Every other day
.   
   cinacalcet (SENSIPAR) 30 mg tablet Take 1 tablet by mouth Daily. 30 tablet 12 
   fludrocortisone (FLORINEF) 0.1 mg tablet Take 1 tablet by mouth Every other day. 45 tab
let 2 
   furosemide (LASIX) 40 mg tablet Take 1 tablet by mouth Daily as needed. 30 tablet 5 
   glucose blood VI test strips (ONE TOUCH ULTRA TEST) strip Check blood sugar before each
 meal and as directed 100 each 12 
   insulin glargine (LANTUS) 100 units/mL injection (vial) Inject 20 Units under the skin 
every morning. 10 mL 11 
   insulin lispro (HUMALOG) 100 units/mL injection (vial) Inject subcutaneously before sara
ls according to sliding scale 10 vial 11 
   Insulin Syringe-Needle U-100 (BD INSULIN SYRINGE ULTRAFINE) 31G X 5/16" 0.5 ML MISC Use
 before meals and as directed. 100 each 11 
   levothyroxine (LEVOTHROID) 50 mcg tablet Take 1 tablet by mouth Daily. 30 tablet 11 
   lisinopril (PRINIVIL,ZESTRIL) 30 MG tablet Take 1 tablet by mouth Daily. 90 tablet 3 
   loperamide (ANTI-DIARRHEAL) 2 mg capsule Take 1 capsule by mouth 4 times daily as neede
d. 60 capsule 5 
   losartan (COZAAR) 50 mg tablet Take 1 tablet by mouth Daily. 90 tablet 4 
   magnesium oxide (MAG-OX) 400 mg tablet Take 1 tablet by mouth 2 times daily. 62 tablet 
11 
   metoprolol tartrate (LOPRESSOR) 25 mg tablet Take 1 tablet by mouth 2 times daily. 60 t
ablet 11 
   mycophenolate (CELLCEPT) 250 mg capsule Take 1 capsule by mouth 3 times daily. 90 capsu
le 11 
   omeprazole (PRILOSEC) 20 mg capsule Take one capsule by mouth once daily on an empty st
omach 90 capsule 4 
   predniSONE (DELTASONE) 5 mg tablet Take 1 tablet by mouth Daily. 90 tablet 3 
   Prenatal Multivit-Min-Fe-FA (PRENATAL VITAMINS) 0.8 MG TABS Take 0.8 mg by mouth Daily.
 30 each 11 
   Respiratory Therapy Supplies MISC Decrease CPAP to 12-18 cmH2O Diagnosis Code(s)327.23.
 Please send order to [facility]. 1 each 0 
 
   rosuvastatin (CRESTOR) 20 mg tablet Take 1 tablet by mouth nightly. 30 tablet 11 
   tacrolimus (PROGRAF) 0.5 mg capsule Take 1 capsule by mouth 2 times daily. With 1 mg to
 equal 1.5 mg bid (Patient taking differently: Take 0.5 mg by mouth 2 times daily. With 1 mg
 to = 1.5 mg bid) 60 capsule 11 
   tacrolimus (PROGRAF) 1 mg capsule Take 1 mg by mouth twice daily with 0.5 mg to equal 1
.5 mg twice daily 60 capsule 11 
 
 No current facility-administered medications for this visit. 
  
 
 ALLERGIES
 No Known Allergies
 
 ROS
 Review of Systems 
 Constitutional: Positive for malaise/fatigue (Not new). Negative for fever, chills, weight 
loss and diaphoresis. 
 HENT: Negative for hearing loss, nosebleeds and tinnitus.  
 Eyes: Positive for blurred vision (Cataracts). Negative for double vision. 
 Respiratory: Positive for shortness of breath. Negative for cough.  
 Cardiovascular: Positive for orthopnea (CPAP) and leg swelling. Negative for chest pain, pa
lpitations and claudication. 
 Gastrointestinal: Negative for heartburn, nausea, vomiting, abdominal pain, diarrhea, const
ipation, blood in stool and melena. 
 Genitourinary: Negative for urgency, frequency and hematuria. 
 Musculoskeletal: Positive for back pain and joint pain. Negative for myalgias, falls and ne
ck pain. 
 Skin: Negative for itching and rash. 
 Neurological: Positive for tremors and weakness. Negative for dizziness, tingling, seizures
, loss of consciousness and headaches. 
 Endo/Heme/Allergies: Positive for environmental allergies. Negative for polydipsia. Bruises
/bleeds easily. 
 Psychiatric/Behavioral: Negative for memory loss. The patient is not nervous/anxious and do
es not have insomnia.  
 
 OBJECTIVE:  
 
 PHYSICAL EXAM
 /76 mmHg | Pulse 64 | Resp 18 | Ht 1.676 m (5' 6") | Wt 124.286 kg (274 lb) | BMI 44.
25 kg/m2 
 Physical Exam 
 Constitutional: She appears well-developed and well-nourished. No distress. 
 Obese, female individual without acute distress. 
 Neck: Normal carotid pulses, no hepatojugular reflux and no JVD present. Carotid bruit is n
ot present. 
 Cardiovascular: Normal rate, regular rhythm, S1 normal, S2 normal, normal heart sounds, int
act distal pulses and normal pulses.  PMI is not displaced.  Exam reveals no gallop, no S3, 
no S4 and no friction rub.  
 No murmur heard.
 Pulses:
      Carotid pulses are 2+ on the right side, and 2+ on the left side.
      Dorsalis pedis pulses are 2+ on the right side, and 2+ on the left side. 
 Pulmonary/Chest: Effort normal and breath sounds normal. No accessory muscle usage. No resp
iratory distress. She has no wheezes. She has no rhonchi. She has no rales. 
 Abdominal: Normal appearance, normal aorta and bowel sounds are normal. She exhibits no abd
ominal bruit. There is no hepatosplenomegaly. There is no tenderness. 
 Musculoskeletal: She exhibits edema (trace bilateral ankle pitting edema.). 
 Neurological: She is alert. Gait normal. 
 Skin: Skin is warm and dry. 
 Psychiatric: She has a normal mood and affect. Her mood appears not anxious. She does not e
 
xhibit a depressed mood. 
 
 EC/8/15:  Sinus rhythm, normal ECG, rate 67 beats per minute.
 
 LAB RESULTS: 
 LIPID
 Lab Results 
 Component Value Date 
  CHOL 162 2013 
  TRIG 225 2013 
  HDL 45 2013 
  LDL 72 2013 
  LDLEX 69 2015 
  HDLEX 47.9 2015 
  TRIGEX 194* 2015 
  CHOLEX 156 2015 
 
 CHEMISTRY
 Lab Results 
 Component Value Date 
   2014 
  GLUEX 128* 2015 
   2014 
  NAEX 139 2015 
  K 5.4 2014 
  KEX 5.3* 2015 
   2014 
  CLEX 110 2015 
  CO2 22 2014 
  CO2EX 20 2015 
  CALCIUM 10.4 2014 
  ALKPHOS 100 2014 
  AST 20 07/10/2013 
  ASTEX 24 2015 
  ALT 14 07/10/2013 
  ALTEX 16 2015 
  BILITOT 0.6 2014 
  CREA 1.7 07/10/2013 
  BUN 40 2014 
  EGFR 31.0 07/10/2013 
  EGFREX 44 2015 
  CREEX 1.21 2015 
 
 HEMATOLOGY
 Lab Results 
 Component Value Date 
  WBC 4.6 07/10/2013 
  WBCEX 3.8* 2015 
  HGB 11.9 07/10/2013 
  HGBEX 13.3 2015 
  HCT 35.7 07/10/2013 
  HCTEX 39.9 2015 
  * 07/10/2013 
  PLTEX 155 2015 
 
 I reviewed records from PCP for office visit on 2015.
 
 ASSESSMENT:  
 
 1. Coronary artery disease:
 
 A.  Status post CABG x 3 in  with LIMA to the LAD, SVG to the OM1 and OM2.
             B.  A persantine sestamibi stress test on 07 revealed a medium sized, mod
erate in severity fixed defect of the anterior wall, and a small sized mild in severity fixe
d defect of the inferior wall, LVEF by gated SPECT was 66%.
  C.  Bilateral heart catheterization on 05/03/10, shows severe two vessel coronary artery d
isease, a chronic occlusion through the proximal portion of the left anterior descending art
adele and a chronic occlusion through the proximal portion of the left circumflex artery, rani
ts: open left internal mammary artery graft to the mid left anterior descending artery, open
 saphenous vein grafts to the OM1 and OM2, mildly dilated left ventricular cavity with a nor
mal left systolic function, LVEF 70%, mild to moderate pulmonary hypertension and a normal c
ardiac output, coronary circulation is right dominant, normal system pressure.
  D.  Persantine nuclear medicine stress test 14 shows a normal myocardial perfusion im
aging study with normal left ventricular size, wall thickness, LVEF by gated SPECT of 78%.  
  E.  Today she denies any chest pain. There is no signs and symptoms of overt congestive he
art failure.  She is in a class II of New [place] Heart Association functional class.  There is
 trace lower extremity edema on physical examination. 
 
 2.  Right sided heart failure/ cor pulmonale / severe pulmonary hypertension:
             A. The echocardiogram from 02/19/10 revealed moderate bi-atrial dilatation and 
normal left ventricular size, wall thickness and motion, LVEF is 55-60%, dilated right ventr
icle with moderate hypo-kinesis, moderate tricuspid valve regurgitation, severe pulmonary hy
pertension with a peak systolic pressure of 80 mmHg, and a small pericardial effusion. 
  B. Echocardiogram from 4/15/14 showed Mild left atrial dilatation. Normal left ventricular
 size, wall thickness and motion. Preserved  left ventricular systolic function. LVEF is 70-
75%. Grade 1 left ventricular diastole dysfunction. Mild tricuspid valve regurgitation. Mild
 thickened trileaflet aortic valve with adequate opening. A mild aortic valve insufficiency.
 Normal right-sided pressure.
 
 3. Hypertension:
  A.  Today her blood pressure is well-controlled.
 
 4.  Hyperlipidemia.
 
 5.  Obstructive sleep apnea:
  A.  She uses a CPAP machine at night with 2 L of oxygen bled in.
 
 6.  Morbid obesity.
 
 7. Type II diabetes.
 
 8. Chronic kidney disease:
  A.  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 Followed by Dr. Msoer.
 
 9.  DVT left leg 2015:
  A.  She is on apixaban, apparently indefinitely.  She has follow up with prescribing provi
chandler in January.
 
 PLAN:  
 
 She will continue with her current medical regimen. 
 She will address whether or not she still requires both ARB and ACE-I with nephrologist, Dr Nikkie Moser, at her next follow up appointment.
 She will follow up in 1 year, or sooner with concerns. 
 
 Electronically signed by: Abebe Townsend MD [facility] 2015 
 
 Portions of this chart may have been created with Dragon voice recognition software. Occasi
onal wrong-word or   sound-alike  substitutions may have occurred due to the inherent naqvi
itations of voice recognition software. Please read the chart carefully and recognize, using
 context, where these substitutions have occurred.
 
 ITonia ARNP, am acting as a scribe on behalf of, and in the presence of Abebe barragan MD. Electronically signed by Abebe Townsend MD at 2015  4:25 PM Bernardo arias in this encounter
 
 Plan of Treatment
 
 
+--------+-----------+------------+----------------------+-------------------+
| Date   | Type      | Specialty  | Care Team            | Description       |
+--------+-----------+------------+----------------------+-------------------+
| / | Office    | Cardiology |   Tonia Wilson,    |                   |
|    | Visit     |            | ARNP  [address]   |                   |
|        |           |            | [facility] [place]  |                   |
|        |           |            | [address] [phone]  |                   |
|        |           |            |  [phone] (Fax)  |                   |
+--------+-----------+------------+----------------------+-------------------+
| / | Hospital  | Radiology  |   Abebe Townsend, |                   |
|    | Encounter |            |  MD  [address] |                   |
|        |           |            |  [facility]   |                   |
|        |           |            | [place] [address]             |                   |
|        |           |            | [phone]         |                   |
|        |           |            | [phone] (Fax)   |                   |
+--------+-----------+------------+----------------------+-------------------+
| / | Surgery   | Radiology  |   AimeechidiAbebe, | CV EP PPM SYSTEM  |
2020   |           |            |  MD  [address] | IMPLANT           |
|        |           |            |  [place]   |                   |
|        |           |            | [place] [address]             |                   |
|        |           |            | [phone]         |                   |
|        |           |            | [phone] (Fax)   |                   |
+--------+-----------+------------+----------------------+-------------------+
| 02/10/ | Clinical  | Cardiology |                      |                   |
|    | Support   |            |                      |                   |
+--------+-----------+------------+----------------------+-------------------+
| / | Office    | Cardiology |   Tonia Wilson,    |                   |
2020   | Visit     |            | [facility]  [address]   |                   |
|        |           |            |   [place]  |                   |
|        |           |            | [address]  [phone]  |                   |
|        |           |            |  [phone] (Fax)  |                   |
+--------+-----------+------------+----------------------+-------------------+
| / | Off-Site  | Nephrology |   Marzena Moser  |                   |
2020   | Visit     |            | M, DO  [address]      |                   |
|        |           |            | [address]      |                   |
|        |           |            | [place]      |                   |
|        |           |            | [address]  [phone]  |                   |
|        |           |            |  [phone] (Fax)  |                   |
+--------+-----------+------------+----------------------+-------------------+
 documented as of this encounter
 
 Procedures
 
 
+----------------+--------+-------------+----------------------+----------------------+
| Procedure Name | Priori | Date/Time   | Associated Diagnosis | Comments             |
|                | ty     |             |                      |                      |
+----------------+--------+-------------+----------------------+----------------------+
| ECG 12 LEAD    | Routin | 2015  |   Murmur  Other      |   Results for this   |
|                | e      |  3:26 PM    | hyperlipidemia       | procedure are in the |
|                |        | PST         | Coronary artery      |  results section.    |
|                |        |             | disease involving    |                      |
|                |        |             | native artery of     |                      |
 
|                |        |             | transplanted heart   |                      |
|                |        |             | without angina       |                      |
|                |        |             | pectoris             |                      |
|                |        |             | Hypertension,        |                      |
|                |        |             | essential            |                      |
+----------------+--------+-------------+----------------------+----------------------+
 documented in this encounter
 
 Results
 ECG 12 lead (2015  3:26 PM PST)
 
+-------------+--------------------------+-----------+------------+--------------+
| Component   | Value                    | Ref Range | Performed  | Pathologist  |
|             |                          |           | At         | Signature    |
+-------------+--------------------------+-----------+------------+--------------+
| VENTRICULAR | 67                       | BPM       | [facility]  |              |
|  RATE EKG   |                          |           |            |              |
+-------------+--------------------------+-----------+------------+--------------+
| ATRIAL RATE | 67                       | BPM       | [facility]  |              |
+-------------+--------------------------+-----------+------------+--------------+
| P-R         | 190                      | ms        | [facility]  |              |
| INTERVAL    |                          |           |            |              |
+-------------+--------------------------+-----------+------------+--------------+
| QRS         | 82                       | ms        | [facility]  |              |
| DURATION    |                          |           |            |              |
+-------------+--------------------------+-----------+------------+--------------+
| Q-T         | 408                      | ms        | [facility]  |              |
| INTERVAL    |                          |           |            |              |
+-------------+--------------------------+-----------+------------+--------------+
| Q-T         | 431                      | ms        | [facility]  |              |
| INTERVAL    |                          |           |            |              |
| (CORRECTED) |                          |           |            |              |
+-------------+--------------------------+-----------+------------+--------------+
| QRS AXIS    | -44                      | degrees   | [facility]  |              |
+-------------+--------------------------+-----------+------------+--------------+
| T AXIS      | 63                       | degrees   | [facility]  |              |
+-------------+--------------------------+-----------+------------+--------------+
| INTERPRETAT | Normal sinus rhythmLeft  |           | [facility]  |              |
| ION TEXT    | axis deviationAbnormal   |           |            |              |
|             | ECGWhen compared with    |           |            |              |
|             | ECG of 08-DEC-2015       |           |            |              |
|             | 15:25, (Unconfirmed)No   |           |            |              |
|             | significant change was   |           |            |              |
|             | foundConfirmed by        |           |            |              |
|             | ABEBE TOWNSEND MD     |           |            |              |
|             | ([id number]) on 2015     |           |            |              |
|             | 5:27:44 PM               |           |            |              |
+-------------+--------------------------+-----------+------------+--------------+
 
 
 
+----------+
| Specimen |
+----------+
|          |
+----------+
 
 
 
+----------------------------------------------------------+--------------+
 
| Narrative                                                | Performed At |
+----------------------------------------------------------+--------------+
|   This result has an attachment that is not available.   |              |
+----------------------------------------------------------+--------------+
 
 
 
+--------------+---------+--------------------+--------------+
| Performing   | Address | City/State/Zipcode | Phone Number |
| Organization |         |                    |              |
+--------------+---------+--------------------+--------------+
|   [facility]  |         |                    |              |
+--------------+---------+--------------------+--------------+
 documented in this encounter
 
 Visit Diagnoses
 
 
+-----------------------------------------------------------------------------------------+
| Diagnosis                                                                               |
+-----------------------------------------------------------------------------------------+
|   Murmur - Primary  Undiagnosed cardiac murmurs                                         |
+-----------------------------------------------------------------------------------------+
|   Other hyperlipidemia                                                                  |
+-----------------------------------------------------------------------------------------+
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
| pectoris                                                                                |
+-----------------------------------------------------------------------------------------+
|   Hypertension, essential  Unspecified essential hypertension                           |
+-----------------------------------------------------------------------------------------+
 documented in this encounter

## 2020-01-08 NOTE — XMS
Encounter Summary
  Created on: 2020
 
 Viviana Ricci
 External Reference #: [id number]
 : 47
 Sex: Female
 
 Demographics
 
 
+-----------------------+----------------------+
| Address               | [address]      |
|                       | [place]  [address] |
+-----------------------+----------------------+
| Home Phone            | [phone]      |
+-----------------------+----------------------+
| Preferred Language    | Unknown              |
+-----------------------+----------------------+
| Marital Status        | Single               |
+-----------------------+----------------------+
| Synagogue Affiliation | 1009                 |
+-----------------------+----------------------+
| Race                  | Unknown              |
+-----------------------+----------------------+
| Ethnic Group          | Unknown              |
+-----------------------+----------------------+
 
 
 Author
 
 
+--------------+--------------------------------------------+
| Author       | [facility]  |
|              | and [place]                                |
+--------------+--------------------------------------------+
| Organization | [facility]  |
|              | and [place]                                |
+--------------+--------------------------------------------+
| Address      | Unknown                                    |
+--------------+--------------------------------------------+
| Phone        | Unavailable                                |
+--------------+--------------------------------------------+
 
 
 
 Support
 
 
+----------------+--------------+---------------------+-----------------+
| Name           | Relationship | Address             | Phone           |
+----------------+--------------+---------------------+-----------------+
| Ada/Ed Radhames | ECON         | [place]              | [phone] |
|                |              | [place]  |                 |
|                |              |  [address]              |                 |
+----------------+--------------+---------------------+-----------------+
 
 
 
 
 Care Team Providers
 
 
+-----------------------+------+-------------+
| Care Team Member Name | Role | Phone       |
+-----------------------+------+-------------+
 PCP  | Unavailable |
+-----------------------+------+-------------+
 
 
 
 Reason for Visit
 
 
+-------------------+----------+
| Reason            | Comments |
+-------------------+----------+
| Medication Refill |          |
+-------------------+----------+
 
 
 
 Encounter Details
 
 
+--------+--------+---------------------+----------------------+-------------------+
| Date   | Type   | Department          | Care Team            | Description       |
+--------+--------+---------------------+----------------------+-------------------+
| 10/28/ | Refill |   [facility]         |   Marzena Moser  | Medication Refill |
|    |        | NEPHROLOGY  [address]   | M, DO  [address]      |                   |
|        |        | [address]   | [address]      |                   |
|        |        | [place], [place]     | [place], [place]      |                   |
|        |        | [address]          | [address]  [phone]  |                   |
|        |        | [phone]        |  [phone] (Fax)  |                   |
+--------+--------+---------------------+----------------------+-------------------+
 
 
 
 Social History
 
 
+--------------+-------+-----------+--------+------+
| Tobacco Use  | Types | Packs/Day | Years  | Date |
|              |       |           | Used   |      |
+--------------+-------+-----------+--------+------+
| Never Smoker |       |           |        |      |
+--------------+-------+-----------+--------+------+
 
 
 
+---------------------+---+---+---+
| Smokeless Tobacco:  |   |   |   |
| Never Used          |   |   |   |
+---------------------+---+---+---+
 
 
 
+---------------------------------------------------------------+
| Comments: some second hand smoke exposure, but fairly minimal |
 
+---------------------------------------------------------------+
 
 
 
+-------------+-------------+---------+----------+
| Alcohol Use | Drinks/Week | oz/Week | Comments |
+-------------+-------------+---------+----------+
| No          |             |         |          |
+-------------+-------------+---------+----------+
 
 
 
+------------------+---------------+
| Sex Assigned at  | Date Recorded |
| Birth            |               |
+------------------+---------------+
| Not on file      |               |
+------------------+---------------+
 
 
 
+----------------+-------------+-------------+
| Job Start Date | Occupation  | Industry    |
+----------------+-------------+-------------+
| Not on file    | Not on file | Not on file |
+----------------+-------------+-------------+
 
 
 
+----------------+--------------+------------+
| Travel History | Travel Start | Travel End |
+----------------+--------------+------------+
 
 
 
+-------------------------------------+
| No recent travel history available. |
+-------------------------------------+
 documented as of this encounter
 
 Plan of Treatment
 
 
+--------+-----------+------------+----------------------+-------------------+
| Date   | Type      | Specialty  | Care Team            | Description       |
+--------+-----------+------------+----------------------+-------------------+
| / | Office    | Cardiology |   HellalfredoTonia,    |                   |
|    | Visit     |            | ARNP  [address]   |                   |
|        |           |            | St  [place], [place]  |                   |
|        |           |            | [address]  [phone]  |                   |
|        |           |            |  [phone] (Fax)  |                   |
+--------+-----------+------------+----------------------+-------------------+
| / | Hospital  | Radiology  |   Popeye Townsend, |                   |
|    | Encounter |            |  MD  [address] |                   |
|        |           |            |  St.  [place],   |                   |
|        |           |            | [place] [address]             |                   |
|        |           |            | [phone]         |                   |
|        |           |            | [phone] (Fax)   |                   |
+--------+-----------+------------+----------------------+-------------------+
| / | Surgery   | Radiology  |   Popeye Townsend, | CV EP PPM SYSTEM  |
 
|    |           |            |  MD  [address] | IMPLANT           |
|        |           |            |  St.  [place],   |                   |
|        |           |            | [place] [address]             |                   |
|        |           |            | [phone]         |                   |
|        |           |            | [phone] (Fax)   |                   |
+--------+-----------+------------+----------------------+-------------------+
| 02/10/ | Clinical  | Cardiology |                      |                   |
|    | Support   |            |                      |                   |
+--------+-----------+------------+----------------------+-------------------+
| / | Office    | Cardiology |   Tonia Wilson,    |                   |
|    | Visit     |            | ARNP  [address]   |                   |
|        |           |            | [place]  |                   |
|        |           |            | [address]  [phone]  |                   |
|        |           |            |  [phone] (Fax)  |                   |
+--------+-----------+------------+----------------------+-------------------+
| / | Off-Site  | Nephrology |   Marzena Moser  |                   |
|    | Visit     |            | DO ETIENNE  [address] [place] [address]      |                   |
|        |           |            | [address]      |                   |
|        |           |            | [place]      |                   |
|        |           |            | [address]  [phone]  |                   |
|        |           |            |  [phone] (Fax)  |                   |
+--------+-----------+------------+----------------------+-------------------+
 documented as of this encounter
 
 Visit Diagnoses
 
 
+-------------------------------------------+
| Diagnosis                                 |
+-------------------------------------------+
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
+-------------------------------------------+
 documented in this encounter

## 2020-01-08 NOTE — XMS
Encounter Summary
  Created on: 2020
 
 Viviana Ricci
 External Reference #: [id number]
 : 47
 Sex: Female
 
 Demographics
 
 
+-----------------------+----------------------+
| Address               | [address]      |
|                       | [place]  [address] |
+-----------------------+----------------------+
| Home Phone            | [phone]      |
+-----------------------+----------------------+
| Preferred Language    | Unknown              |
+-----------------------+----------------------+
| Marital Status        | Single               |
+-----------------------+----------------------+
| Holiness Affiliation | 1009                 |
+-----------------------+----------------------+
| Race                  | Unknown              |
+-----------------------+----------------------+
| Ethnic Group          | Unknown              |
+-----------------------+----------------------+
 
 
 Author
 
 
+--------------+--------------------------------------------+
| Author       | [facility]  |
|              | and [place]                                |
+--------------+--------------------------------------------+
| Organization | [facility]  |
|              | and [place]                                |
+--------------+--------------------------------------------+
| Address      | Unknown                                    |
+--------------+--------------------------------------------+
| Phone        | Unavailable                                |
+--------------+--------------------------------------------+
 
 
 
 Support
 
 
+----------------+--------------+---------------------+-----------------+
| Name           | Relationship | Address             | Phone           |
+----------------+--------------+---------------------+-----------------+
| Ada/Ed Radhames | ECON         | [address]              | [phone] |
|                |              | [place]  |                 |
|                |              |  [address]              |                 |
+----------------+--------------+---------------------+-----------------+
 
 
 
 
 Care Team Providers
 
 
+-----------------------+------+-------------+
| Care Team Member Name | Role | Phone       |
+-----------------------+------+-------------+
 PCP  | Unavailable |
+-----------------------+------+-------------+
 
 
 
 Encounter Details
 
 
+--------+-----------+----------------------+----------------------+-------------+
| Date   | Type      | Department           | Care Team            | Description |
+--------+-----------+----------------------+----------------------+-------------+
| 08/15/ | [facility]  |   [facility] |   Marzena Moser  |             |
|  - | Encounter |  MED [facility] MED ONC     | M, DO  [address]      |             |
|        |           | [address]  | [address]      |             |
| / |           | [place] [address] | [place]      |             |
|    |           |   [phone]       | [address]  [phone]  |             |
|        |           |                      |  [phone] (Fax)  |             |
+--------+-----------+----------------------+----------------------+-------------+
 
 
 
 Social History
 
 
+----------------+-------+-----------+--------+------+
| Tobacco Use    | Types | Packs/Day | Years  | Date |
|                |       |           | Used   |      |
+----------------+-------+-----------+--------+------+
| Never Assessed |       |           |        |      |
+----------------+-------+-----------+--------+------+
 
 
 
+------------------+---------------+
| Sex Assigned at  | Date Recorded |
| Birth            |               |
+------------------+---------------+
| Not on file      |               |
+------------------+---------------+
 
 
 
+----------------+-------------+-------------+
| Job Start Date | Occupation  | Industry    |
+----------------+-------------+-------------+
| Not on file    | Not on file | Not on file |
+----------------+-------------+-------------+
 
 
 
+----------------+--------------+------------+
| Travel History | Travel Start | Travel End |
+----------------+--------------+------------+
 
 
 
 
+-------------------------------------+
| No recent travel history available. |
+-------------------------------------+
 documented as of this encounter
 
 Plan of Treatment
 
 
+--------+-----------+------------+----------------------+-------------------+
| Date   | Type      | Specialty  | Care Team            | Description       |
+--------+-----------+------------+----------------------+-------------------+
| / | Office    | Cardiology |   Tonia Wilson,    |                   |
|    | Visit     |            | ARNJESSICA  [address]   |                   |
|        |           |            | St  [place], [place]  |                   |
|        |           |            | [address]  [phone]  |                   |
|        |           |            |  [phone] (Fax)  |                   |
+--------+-----------+------------+----------------------+-------------------+
| / | Hospital  | Radiology  |   Popeye Townsend, |                   |
|    | Encounter |            |  MD  [address] |                   |
|        |           |            |  St.  [place],   |                   |
|        |           |            | [place] [address]             |                   |
|        |           |            | [phone]         |                   |
|        |           |            | [phone] (Fax)   |                   |
+--------+-----------+------------+----------------------+-------------------+
| / | Surgery   | Radiology  |   Popeye Townsend, | CV EP PPM SYSTEM  |
|    |           |            |  MD  [address] | IMPLANT           |
|        |           |            |  St.  [place],   |                   |
|        |           |            | [place] [address]             |                   |
|        |           |            | [phone]         |                   |
|        |           |            | [phone] (Fax)   |                   |
+--------+-----------+------------+----------------------+-------------------+
| 02/10/ | Clinical  | Cardiology |                      |                   |
|    | Support   |            |                      |                   |
+--------+-----------+------------+----------------------+-------------------+
| / | Office    | Cardiology |   Tonia Wilson,    |                   |
|    | Visit     |            | ARNP  [address]   |                   |
|        |           |            | [place]  |                   |
|        |           |            | [address]  [phone]  |                   |
|        |           |            |  [phone] (Fax)  |                   |
+--------+-----------+------------+----------------------+-------------------+
| / | Off-Site  | Nephrology |   Marzena Moser  |                   |
|    | Visit     |            | DO ETIENNE  [address] [place] [address]      |                   |
|        |           |            | [address]      |                   |
|        |           |            | [place]      |                   |
|        |           |            | [address] [phone]  |                   |
|        |           |            |  [phone] (Fax)  |                   |
+--------+-----------+------------+----------------------+-------------------+
 documented as of this encounter
 
 Visit Diagnoses
 Not on filedocumented in this encounter

## 2020-01-08 NOTE — XMS
Encounter Summary
  Created on: 2020
 
 Viviana Rcici
 External Reference #: [id number]
 : 47
 Sex: Female
 
 Demographics
 
 
+-----------------------+----------------------+
| Address               | [address]      |
|                       | [place]  [address] |
+-----------------------+----------------------+
| Home Phone            | [phone]      |
+-----------------------+----------------------+
| Preferred Language    | Unknown              |
+-----------------------+----------------------+
| Marital Status        | Single               |
+-----------------------+----------------------+
| Anabaptism Affiliation | [id number]                 |
+-----------------------+----------------------+
| Race                  | Unknown              |
+-----------------------+----------------------+
| Ethnic Group          | Unknown              |
+-----------------------+----------------------+
 
 
 Author
 
 
+--------------+--------------------------------------------+
| Author       | [facility]  |
|              | and [facility]                                |
+--------------+--------------------------------------------+
| Organization | [facility]  |
|              | and [facility]                                |
+--------------+--------------------------------------------+
| Address      | Unknown                                    |
+--------------+--------------------------------------------+
| Phone        | Unavailable                                |
+--------------+--------------------------------------------+
 
 
 
 Support
 
 
+----------------+--------------+---------------------+-----------------+
| Name           | Relationship | Address             | Phone           |
+----------------+--------------+---------------------+-----------------+
| Ada/Ed Radhames | ECON         | [address]              | [phone] |
|                |              | [place]  |                 |
|                |              |  [address]              |                 |
+----------------+--------------+---------------------+-----------------+
 
 
 
 
 Care Team Providers
 
 
+-----------------------+------+-------------+
| Care Team Member Name | Role | Phone       |
+-----------------------+------+-------------+
 PCP  | Unavailable |
+-----------------------+------+-------------+
 
 
 
 Encounter Details
 
 
+--------+-----------+----------------------+----------------------+-------------+
| Date   | Type      | Department           | Care Team            | Description |
+--------+-----------+----------------------+----------------------+-------------+
| / | Hospital  |   [facility] |   Edgar Sanders,   |             |
|    | Encounter |  [facility] | MD  [address]      |             |
|        |           |   [address]       | [place]      |             |
|        |           | [place]      | [address]  [phone]  |             |
|        |           | [address]           |  [phone] (Fax)  |             |
|        |           | [phone]         |                      |             |
+--------+-----------+----------------------+----------------------+-------------+
 
 
 
 Social History
 
 
+----------------+-------+-----------+--------+------+
| Tobacco Use    | Types | Packs/Day | Years  | Date |
|                |       |           | Used   |      |
+----------------+-------+-----------+--------+------+
| Never Assessed |       |           |        |      |
+----------------+-------+-----------+--------+------+
 
 
 
+------------------+---------------+
| Sex Assigned at  | Date Recorded |
| Birth            |               |
+------------------+---------------+
| Not on file      |               |
+------------------+---------------+
 
 
 
+----------------+-------------+-------------+
| Job Start Date | Occupation  | Industry    |
+----------------+-------------+-------------+
| Not on file    | Not on file | Not on file |
+----------------+-------------+-------------+
 
 
 
+----------------+--------------+------------+
| Travel History | Travel Start | Travel End |
+----------------+--------------+------------+
 
 
 
 
+-------------------------------------+
| No recent travel history available. |
+-------------------------------------+
 documented as of this encounter
 
 Plan of Treatment
 
 
+--------+-----------+------------+----------------------+-------------------+
| Date   | Type      | Specialty  | Care Team            | Description       |
+--------+-----------+------------+----------------------+-------------------+
| / | Office    | Cardiology |   Tonia Wilson,    |                   |
|    | Visit     |            | ARNP  [address]   |                   |
|        |           |            | St  [place], [place]  |                   |
|        |           |            | [address]  [phone]  |                   |
|        |           |            |  [phone] (Fax)  |                   |
+--------+-----------+------------+----------------------+-------------------+
| / | Hospital  | Radiology  |   Popeye Townsend, |                   |
|    | Encounter |            |  MD  [address] |                   |
|        |           |            |  [place],   |                   |
|        |           |            | [place] [address]             |                   |
|        |           |            | [phone]         |                   |
|        |           |            | [phone] (Fax)   |                   |
+--------+-----------+------------+----------------------+-------------------+
| / | Surgery   | Radiology  |   Popeye Townsend, | CV EP PPM SYSTEM  |
|    |           |            |  MD  [address] | IMPLANT           |
|        |           |            |  [place],   |                   |
|        |           |            | [place] [address]             |                   |
|        |           |            | [phone]         |                   |
|        |           |            | [phone] (Fax)   |                   |
+--------+-----------+------------+----------------------+-------------------+
| 02/10/ | Clinical  | Cardiology |                      |                   |
|    | Support   |            |                      |                   |
+--------+-----------+------------+----------------------+-------------------+
| / | Office    | Cardiology |   Tonia Wilson,    |                   |
|    | Visit     |            | ARNP  [address]   |                   |
|        |           |            | [place]  |                   |
|        |           |            | [address]  [phone]  |                   |
|        |           |            |  [phone] (Fax)  |                   |
+--------+-----------+------------+----------------------+-------------------+
| / | Off-Site  | Nephrology |   Marzena Moser  |                   |
|    | Visit     |            | DO ETIENNE  [address] [place] [address]      |                   |
|        |           |            | [address]      |                   |
|        |           |            | [place]      |                   |
|        |           |            | [address] [phone]  |                   |
|        |           |            |  [phone] (Fax)  |                   |
+--------+-----------+------------+----------------------+-------------------+
 documented as of this encounter
 
 Visit Diagnoses
 Not on filedocumented in this encounter

## 2020-01-08 NOTE — XMS
Encounter Summary
  Created on: 2020
 
 Viviana Ricci
 External Reference #: [id number]
 : 47
 Sex: Female
 
 Demographics
 
 
+-----------------------+----------------------+
| Address               | [address]      |
|                       | [place]  [address] |
+-----------------------+----------------------+
| Home Phone            | [phone]      |
+-----------------------+----------------------+
| Preferred Language    | Unknown              |
+-----------------------+----------------------+
| Marital Status        | Single               |
+-----------------------+----------------------+
| Hinduism Affiliation | [id number]                 |
+-----------------------+----------------------+
| Race                  | Unknown              |
+-----------------------+----------------------+
| Ethnic Group          | Unknown              |
+-----------------------+----------------------+
 
 
 Author
 
 
+--------------+--------------------------------------------+
| Author       | [facility]  |
|              | and [place]                                |
+--------------+--------------------------------------------+
| Organization | [facility]  |
|              | and [place]                                |
+--------------+--------------------------------------------+
| Address      | Unknown                                    |
+--------------+--------------------------------------------+
| Phone        | Unavailable                                |
+--------------+--------------------------------------------+
 
 
 
 Support
 
 
+----------------+--------------+---------------------+-----------------+
| Name           | Relationship | Address             | Phone           |
+----------------+--------------+---------------------+-----------------+
| Ada/Ed Radhames | ECON         | [address]              | [phone] |
|                |              | [place]  |                 |
|                |              |  [address]              |                 |
+----------------+--------------+---------------------+-----------------+
 
 
 
 
 Care Team Providers
 
 
+-----------------------+------+-------------+
| Care Team Member Name | Role | Phone       |
+-----------------------+------+-------------+
 PCP  | Unavailable |
+-----------------------+------+-------------+
 
 
 
 Encounter Details
 
 
+--------+----------+---------------------+----------------------+-------------+
| Date   | Type     | Department          | Care Team            | Description |
+--------+----------+---------------------+----------------------+-------------+
| / | Abstract |   [facility] [place]         |   Marzena Moser  |             |
|    |          | NEPHROLOGY  [address]   | M, DO  [address]      |             |
|        |          | [address]   | [address]      |             |
|        |          | [place], [place]     | [place]      |             |
|        |          | [address]          | [address]  [phone]  |             |
|        |          | [phone]        |  [phone] (Fax)  |             |
+--------+----------+---------------------+----------------------+-------------+
 
 
 
 Social History
 
 
+--------------+-------+-----------+--------+------+
| Tobacco Use  | Types | Packs/Day | Years  | Date |
|              |       |           | Used   |      |
+--------------+-------+-----------+--------+------+
| Never Smoker |       |           |        |      |
+--------------+-------+-----------+--------+------+
 
 
 
+---------------------+---+---+---+
| Smokeless Tobacco:  |   |   |   |
| Never Used          |   |   |   |
+---------------------+---+---+---+
 
 
 
+---------------------------------------------------------------+
| Comments: some second hand smoke exposure, but fairly minimal |
+---------------------------------------------------------------+
 
 
 
+-------------+-------------+---------+----------+
| Alcohol Use | Drinks/Week | oz/Week | Comments |
+-------------+-------------+---------+----------+
| No          |             |         |          |
+-------------+-------------+---------+----------+
 
 
 
 
+------------------+---------------+
| Sex Assigned at  | Date Recorded |
| Birth            |               |
+------------------+---------------+
| Not on file      |               |
+------------------+---------------+
 
 
 
+----------------+-------------+-------------+
| Job Start Date | Occupation  | Industry    |
+----------------+-------------+-------------+
| Not on file    | Not on file | Not on file |
+----------------+-------------+-------------+
 
 
 
+----------------+--------------+------------+
| Travel History | Travel Start | Travel End |
+----------------+--------------+------------+
 
 
 
+-------------------------------------+
| No recent travel history available. |
+-------------------------------------+
 documented as of this encounter
 
 Plan of Treatment
 
 
+--------+-----------+------------+----------------------+-------------------+
| Date   | Type      | Specialty  | Care Team            | Description       |
+--------+-----------+------------+----------------------+-------------------+
| / | Office    | Cardiology |   Tonia Wilson,    |                   |
|    | Visit     |            | ARNJESSICA  [address]   |                   |
|        |           |            | [place]  |                   |
|        |           |            | [address]  [phone]  |                   |
|        |           |            |  [phone] (Fax)  |                   |
+--------+-----------+------------+----------------------+-------------------+
| / | Hospital  | Radiology  |   Popeye Townsend, |                   |
|    | Encounter |            |  MD  [address] |                   |
|        |           |            |  St.  [place],   |                   |
|        |           |            | [place] [address]             |                   |
|        |           |            | [phone]         |                   |
|        |           |            | [phone] (Fax)   |                   |
+--------+-----------+------------+----------------------+-------------------+
| / | Surgery   | Radiology  |   Popeye Townsend, | CV EP PPM SYSTEM  |
|    |           |            |  MD  [address] | IMPLANT           |
|        |           |            |  St.  [place],   |                   |
|        |           |            | [place] [address]             |                   |
|        |           |            | [phone]         |                   |
|        |           |            | [phone] (Fax)   |                   |
+--------+-----------+------------+----------------------+-------------------+
| 02/10/ | Clinical  | Cardiology |                      |                   |
|    | Support   |            |                      |                   |
+--------+-----------+------------+----------------------+-------------------+
| / | Office    | Cardiology |   Tonia Wilson,    |                   |
|    | Visit     |            | ARNP  [address]   |                   |
 
|        |           |            | St  [place], [place]  |                   |
|        |           |            | [address] [phone]  |                   |
|        |           |            |  [phone] (Fax)  |                   |
+--------+-----------+------------+----------------------+-------------------+
| / | Off-Site  | Nephrology |   Marzena Moser  |                   |
| 2020   | Visit     |            | DO ETIENNE  [address] [place] [address]      |                   |
|        |           |            | [address]      |                   |
|        |           |            | [place]      |                   |
|        |           |            | [id number] [phone]  |                   |
|        |           |            |  [phone] (Fax)  |                   |
+--------+-----------+------------+----------------------+-------------------+
 documented as of this encounter
 
 Visit Diagnoses
 Not on filedocumented in this encounter

## 2020-01-08 NOTE — XMS
Encounter Summary
  Created on: 2020
 
 Viviana Ricci
 External Reference #: [id number]
 : 47
 Sex: Female
 
 Demographics
 
 
+-----------------------+----------------------+
| Address               | [address]      |
|                       | [place]  [address] |
+-----------------------+----------------------+
| Home Phone            | [phone]      |
+-----------------------+----------------------+
| Preferred Language    | Unknown              |
+-----------------------+----------------------+
| Marital Status        | Single               |
+-----------------------+----------------------+
| Synagogue Affiliation | [id number]                 |
+-----------------------+----------------------+
| Race                  | Unknown              |
+-----------------------+----------------------+
| Ethnic Group          | Unknown              |
+-----------------------+----------------------+
 
 
 Author
 
 
+--------------+--------------------------------------------+
| Author       | [facility]  |
|              | and [facility]                                |
+--------------+--------------------------------------------+
| Organization | [facility]  |
|              | and [facility]                                |
+--------------+--------------------------------------------+
| Address      | Unknown                                    |
+--------------+--------------------------------------------+
| Phone        | Unavailable                                |
+--------------+--------------------------------------------+
 
 
 
 Support
 
 
+----------------+--------------+---------------------+-----------------+
| Name           | Relationship | Address             | Phone           |
+----------------+--------------+---------------------+-----------------+
| Ada/Ed Radhames | ECON         | [address]              | [phone] |
|                |              | [place]  |                 |
|                |              |  [address]              |                 |
+----------------+--------------+---------------------+-----------------+
 
 
 
 
 Care Team Providers
 
 
+-----------------------+------+-------------+
| Care Team Member Name | Role | Phone       |
+-----------------------+------+-------------+
 PCP  | Unavailable |
+-----------------------+------+-------------+
 
 
 
 Reason for Visit
 
 
+-------------------+----------+
| Reason            | Comments |
+-------------------+----------+
| Medication Refill |          |
+-------------------+----------+
 
 
 
 Encounter Details
 
 
+--------+--------+---------------------+----------------------+-------------------+
| Date   | Type   | Department          | Care Team            | Description       |
+--------+--------+---------------------+----------------------+-------------------+
| / | Refill |   [facility]         |   Marzena Moser  | Medication Refill |
| 2016   |        | NEPHROLOGY  [address]   | M, DO  [address]      |                   |
|        |        | [address]   | [address]      |                   |
|        |        | [place], [place]     | [place], [place]      |                   |
|        |        | [address]          | [address]  [phone]  |                   |
|        |        | [phone]        |  [phone] (Fax)  |                   |
+--------+--------+---------------------+----------------------+-------------------+
 
 
 
 Social History
 
 
+--------------+-------+-----------+--------+------+
| Tobacco Use  | Types | Packs/Day | Years  | Date |
|              |       |           | Used   |      |
+--------------+-------+-----------+--------+------+
| Never Smoker |       |           |        |      |
+--------------+-------+-----------+--------+------+
 
 
 
+---------------------+---+---+---+
| Smokeless Tobacco:  |   |   |   |
| Never Used          |   |   |   |
+---------------------+---+---+---+
 
 
 
+---------------------------------------------------------------+
| Comments: some second hand smoke exposure, but fairly minimal |
 
+---------------------------------------------------------------+
 
 
 
+-------------+-------------+---------+----------+
| Alcohol Use | Drinks/Week | oz/Week | Comments |
+-------------+-------------+---------+----------+
| No          |             |         |          |
+-------------+-------------+---------+----------+
 
 
 
+------------------+---------------+
| Sex Assigned at  | Date Recorded |
| Birth            |               |
+------------------+---------------+
| Not on file      |               |
+------------------+---------------+
 
 
 
+----------------+-------------+-------------+
| Job Start Date | Occupation  | Industry    |
+----------------+-------------+-------------+
| Not on file    | Not on file | Not on file |
+----------------+-------------+-------------+
 
 
 
+----------------+--------------+------------+
| Travel History | Travel Start | Travel End |
+----------------+--------------+------------+
 
 
 
+-------------------------------------+
| No recent travel history available. |
+-------------------------------------+
 documented as of this encounter
 
 Plan of Treatment
 
 
+--------+-----------+------------+----------------------+-------------------+
| Date   | Type      | Specialty  | Care Team            | Description       |
+--------+-----------+------------+----------------------+-------------------+
| / | Office    | Cardiology |   HellalfredoTonia,    |                   |
|    | Visit     |            | ARNP  [address]   |                   |
|        |           |            | St  [place], [place]  |                   |
|        |           |            | [address]  [phone]  |                   |
|        |           |            |  [phone] (Fax)  |                   |
+--------+-----------+------------+----------------------+-------------------+
| / | Hospital  | Radiology  |   Popeye Townsend, |                   |
|    | Encounter |            |  MD  [address] |                   |
|        |           |            |  St.  [place],   |                   |
|        |           |            | [place] [address]             |                   |
|        |           |            | [phone]         |                   |
|        |           |            | [phone] (Fax)   |                   |
+--------+-----------+------------+----------------------+-------------------+
| / | Surgery   | Radiology  |   Popeye Townsend, | CV EP PPM SYSTEM  |
 
|    |           |            |  MD  [address] | IMPLANT           |
|        |           |            |  St.  [place],   |                   |
|        |           |            | [place] [address]             |                   |
|        |           |            | [phone]         |                   |
|        |           |            | [phone] (Fax)   |                   |
+--------+-----------+------------+----------------------+-------------------+
| 02/10/ | Clinical  | Cardiology |                      |                   |
|    | Support   |            |                      |                   |
+--------+-----------+------------+----------------------+-------------------+
| / | Office    | Cardiology |   Tonia Wilson,    |                   |
|    | Visit     |            | ARNP  [address]   |                   |
|        |           |            | [place]  |                   |
|        |           |            | [address]  [phone]  |                   |
|        |           |            |  [phone] (Fax)  |                   |
+--------+-----------+------------+----------------------+-------------------+
| / | Off-Site  | Nephrology |   Marzena Moser  |                   |
|    | Visit     |            | DO ETIENNE  [address] [place] [address]      |                   |
|        |           |            | [address]      |                   |
|        |           |            | [place]      |                   |
|        |           |            | [address]  [phone]  |                   |
|        |           |            |  [phone] (Fax)  |                   |
+--------+-----------+------------+----------------------+-------------------+
 documented as of this encounter
 
 Visit Diagnoses
 Not on filedocumented in this encounter

## 2020-01-08 NOTE — XMS
Encounter Summary
  Created on: 2020
 
 Viviana Ricci
 External Reference #: [id number]
 : 47
 Sex: Female
 
 Demographics
 
 
+-----------------------+----------------------+
| Address               | [address]      |
|                       | [place]  [address] |
+-----------------------+----------------------+
| Home Phone            | [phone]      |
+-----------------------+----------------------+
| Preferred Language    | Unknown              |
+-----------------------+----------------------+
| Marital Status        | Single               |
+-----------------------+----------------------+
| Islam Affiliation | [id number]                 |
+-----------------------+----------------------+
| Race                  | Unknown              |
+-----------------------+----------------------+
| Ethnic Group          | Unknown              |
+-----------------------+----------------------+
 
 
 Author
 
 
+--------------+--------------------------------------------+
| Author       | [facility]  |
|              | and [place]                                |
+--------------+--------------------------------------------+
| Organization | [facility]  |
|              | and [place]                                |
+--------------+--------------------------------------------+
| Address      | Unknown                                    |
+--------------+--------------------------------------------+
| Phone        | Unavailable                                |
+--------------+--------------------------------------------+
 
 
 
 Support
 
 
+----------------+--------------+---------------------+-----------------+
| Name           | Relationship | Address             | Phone           |
+----------------+--------------+---------------------+-----------------+
| Ada/Ed Radhames | ECON         | [address]              | [phone] |
|                |              | [place], [place]  |                 |
|                |              |  [id number]              |                 |
+----------------+--------------+---------------------+-----------------+
 
 
 
 
 Care Team Providers
 
 
+-----------------------+------+-------------+
| Care Team Member Name | Role | Phone       |
+-----------------------+------+-------------+
 PCP  | Unavailable |
+-----------------------+------+-------------+
 
 
 
 Reason for Referral
 Diagnostic/Screening (Routine)
 
+--------+--------+-----------+--------------+--------------+---------------+
| Status | Reason | Specialty | Diagnoses /  | Referred By  | Referred To   |
|        |        |           | Procedures   | Contact      | Contact       |
+--------+--------+-----------+--------------+--------------+---------------+
| Closed |        | Radiology |   Diagnoses  |              |   [facility]    |
|        |        |           |  Pulmonary   | [place],  | [address]  |
|        |        |           | hypertension | Nena GUSMAN,   |  [place], |
|        |        |           |  ([facility])       | MD  [address]    |  [place]           |
|        |        |           | Procedures   | [address]    | [id number]    |
|        |        |           | ECHO         | [place], | Phone:        |
|        |        |           | Complete     |  [place] [address]    | [phone]  |
|        |        |           |              |              |  Fax:         |
|        |        |           |              |              | [phone]  |
+--------+--------+-----------+--------------+--------------+---------------+
 
 
 
 
 Reason for Visit
 
 
+--------+----------------------+
| Reason | Comments             |
+--------+----------------------+
| Other  | requesting follow up |
+--------+----------------------+
 
 
 
 Encounter Details
 
 
+--------+-----------+----------------------+----------------+--------------------+
| Date   | Type      | Department           | Care Team      | Description        |
+--------+-----------+----------------------+----------------+--------------------+
| / | Telephone |   [facility] [place]          |   Lucretia,  | Other (requesting  |
|    |           | PULMONARY  [address]     | Nena GUSMAN MD  | follow up)         |
|        |           | [place], |                |                    |
|        |           |  [place] [address]       |                |                    |
|        |           | [phone]         |                |                    |
+--------+-----------+----------------------+----------------+--------------------+
 
 
 
 Social History
 
 
 
+--------------+-------+-----------+--------+------+
| Tobacco Use  | Types | Packs/Day | Years  | Date |
|              |       |           | Used   |      |
+--------------+-------+-----------+--------+------+
| Never Smoker |       |           |        |      |
+--------------+-------+-----------+--------+------+
 
 
 
+---------------------+---+---+---+
| Smokeless Tobacco:  |   |   |   |
| Never Used          |   |   |   |
+---------------------+---+---+---+
 
 
 
+---------------------------------------------------------------+
| Comments: some second hand smoke exposure, but fairly minimal |
+---------------------------------------------------------------+
 
 
 
+-------------+-------------+---------+----------+
| Alcohol Use | Drinks/Week | oz/Week | Comments |
+-------------+-------------+---------+----------+
| No          |             |         |          |
+-------------+-------------+---------+----------+
 
 
 
+------------------+---------------+
| Sex Assigned at  | Date Recorded |
| Birth            |               |
+------------------+---------------+
| Not on file      |               |
+------------------+---------------+
 
 
 
+----------------+-------------+-------------+
| Job Start Date | Occupation  | Industry    |
+----------------+-------------+-------------+
| Not on file    | Not on file | Not on file |
+----------------+-------------+-------------+
 
 
 
+----------------+--------------+------------+
| Travel History | Travel Start | Travel End |
+----------------+--------------+------------+
 
 
 
+-------------------------------------+
| No recent travel history available. |
+-------------------------------------+
 documented as of this encounter
 
 
 Plan of Treatment
 
 
+--------+-----------+------------+----------------------+-------------------+
| Date   | Type      | Specialty  | Care Team            | Description       |
+--------+-----------+------------+----------------------+-------------------+
| / | Office    | Cardiology |   Tonia Wilson,    |                   |
|    | Visit     |            | BELKIS  [address]   |                   |
|        |           |            | St  [place] [facility], [place]  |                   |
|        |           |            | [id number] [phone]  |                   |
|        |           |            |  [phone] (Fax)  |                   |
+--------+-----------+------------+----------------------+-------------------+
| / | Hospital  | Radiology  |   IrineoPopeye floyd, |                   |
|    | Encounter |            |  MD  [address] |                   |
|        |           |            |  St.  [place],   |                   |
|        |           |            | [place] [address]             |                   |
|        |           |            | [phone]         |                   |
|        |           |            | [phone] (Fax)   |                   |
+--------+-----------+------------+----------------------+-------------------+
| / | Surgery   | Radiology  |   Popeye Townsend, | CV EP PPM SYSTEM  |
|    |           |            |  MD  [address] | IMPLANT           |
|        |           |            |  St.  [place],   |                   |
|        |           |            | [place] [address]             |                   |
|        |           |            | [phone]         |                   |
|        |           |            | [phone] (Fax)   |                   |
+--------+-----------+------------+----------------------+-------------------+
| 02/10/ | Clinical  | Cardiology |                      |                   |
|    | Support   |            |                      |                   |
+--------+-----------+------------+----------------------+-------------------+
| / | Office    | Cardiology |   Tonia Wilson,    |                   |
|    | Visit     |            | ARNJESSICA  [address]   |                   |
|        |           |            | St  [place], [place]  |                   |
|        |           |            | [address]  [phone]  |                   |
|        |           |            |  [phone] (Fax)  |                   |
+--------+-----------+------------+----------------------+-------------------+
| / | Off-Site  | Nephrology |   Marzena Moser  |                   |
|    | Visit     |            | ETIENNE   [address]      |                   |
|        |           |            | [address]      |                   |
|        |           |            | [place]      |                   |
|        |           |            | [address]  [phone]  |                   |
|        |           |            |  [phone] (Fax)  |                   |
+--------+-----------+------------+----------------------+-------------------+
 documented as of this encounter
 
 Results
 PFT PULMONARY FUNCTION TESTING ORDERS Full PFT ([place] w/BD, lung volumes, diffusion)?: Yes 
(2014  7:13 AM PDT)
 
+------------------------------------------------------------------------+--------------+
| Narrative                                                              | Performed At |
+------------------------------------------------------------------------+--------------+
|   Nena Boykin MD       2014   7:13        PULMONARY     |              |
| FUNCTION TESTING   SPIROMETRY: FVC was normal at 2.60 L or 81% of      |              |
| predicted. FEV1   was normal at 2.15 L or 87% of predicted. FEV1/FVC   |              |
| ratio was   normal at 83%.   LUNG VOLUMES: Total lung capacity was     |              |
| normal at 4.28 L or 80% of   predicted. Residual volume was moderately |              |
|  reduced at 1.29 L or   58% of predicted. RV/TLC ratio was mildly      |              |
| reduced at 30% or 71 %   of predicted. ERV was significantly reduced   |              |
| at 0.26 L or 24% of   predicted.   DIFFUSION CAPACITY: Diffusion       |              |
| capacity was moderately reduced at   14.1 mL/mmHg per minute or 56% of |              |
 
|  predicted and was not corrected   for a measured hemoglobin.          |              |
| IMPRESSION: Spirometry is consistent with normal physiology. Lung      |              |
| volume testing is consistent with suggests possible mild   restrictive |              |
|  physiology physiology. Diffusion capacity is   moderately reduced and |              |
|  is not corrected for measured hemoglobin.   Spriometry has not        |              |
| changed significantly since 2013.   Since 2013, there |              |
|  has been a drop in FVC and FEV1 that   exceeds 10% (exact percent not |              |
|  calculated). DLCO has increased   since that time. Neither DLCO value |              |
|  was corrected for a measured   hemoglobin.     Electronically signed  |              |
| by: Nena Boykin MD 2014   7:06  [facility]   |              |
| [facility]     CC: Marzena Moser                         |              |
+------------------------------------------------------------------------+--------------+
 
 
 
+------------------------------------------------------------------------------------------+
| Procedure Note                                                                           |
+------------------------------------------------------------------------------------------+
|   Nena Boykin MD - 2014  7:06 AM PDT  PULMONARY FUNCTION TESTING        |
| SPIROMETRY: FVC was normal at 2.60 L or 81% of predicted. FEV1 was normal at 2.15 L or   |
| 87% of predicted. FEV1/FVC ratio was normal at 83%. LUNG VOLUMES: Total lung capacity    |
| was normal at 4.28 L or 80% of predicted. Residual volume was moderately reduced at 1.29 |
|  L or 58% of predicted. RV/TLC ratio was mildly reduced at 30% or 71 % of predicted. ERV |
|  was significantly reduced at 0.26 L or 24% of predicted. DIFFUSION CAPACITY: Diffusion  |
| capacity was moderately reduced at 14.1 mL/mmHg per minute or 56% of predicted and was   |
| not corrected for a measured hemoglobin.IMPRESSION: Spirometry is consistent with normal |
|  physiology. Lung volume testing is consistent with suggests possible mild restrictive   |
| physiology physiology. Diffusion capacity is moderately reduced and is not corrected for |
|  measured hemoglobin. Spriometry has not changed significantly since 2013. Since |
|  2013, there has been a drop in FVC and FEV1 that exceeds 10% (exact percent    |
| not calculated). DLCO has increased since that time. Neither DLCO value was corrected    |
| for a measured hemoglobin.Electronically signed by: Nena Boykin MD 2014  |
| 7:06[facility]a [facility]oser                       |
+------------------------------------------------------------------------------------------+
 ECHO Complete (04/15/2014  3:55 PM PDT)
 
+----------+
| Specimen |
+----------+
|          |
+----------+
 
 
 
+------------------------------------------------------------------------+-----------------+
| Narrative                                                              | Performed At    |
+------------------------------------------------------------------------+-----------------+
|   [facility]     ECHOCARDIOGRAM REPORT         |   [place]    |
| STUDY DATE:   4/15/2014        PATIENT NAME: Viviana Ricci  :      | [facility]        |
| 1945  MRN: [id number]  PCP: Marzena Moser, [facility]  |
| CLINICAL HISTORY/DIAGNOSIS:   PULM HTN     A transthoracic             | - IMAGING       |
| echocardiogram with M-mode, pulsed-wave and color Doppler   was        |                 |
| performed with standard views obtained.   The technical quality of     |                 |
| this   examination is adequate.   The heart rhythm during the echo is  |                 |
| sinus   rhythm.   The M-mode, two-dimensional, color flow and spectral |                 |
|  Doppler data   were reviewed and support the following                |                 |
| interpretation:     Interpretation:  Left Atrium: Mildly dilated  Left |                 |
|  ventricle:   Left ventricular size is normal with normal wall         |                 |
| thickness and motion, and normal left ventricular systolic function.   |                 |
|   The   estimated ejection fraction is 70-75 %.   Grade 1 left         |                 |
 
| ventricular   diastolic dysfunction.   Aortic root: Aortic root is     |                 |
| normal.  Right Atrium:   Right atrial sizes normal.   Normal           |                 |
| right-sided pressure.  Right ventricle:   Right ventricular size is    |                 |
| normal with normal wall   thickness and normal right ventricular       |                 |
| systolic function.  Pericardium:   Pericardium is normal.  Pulmonary   |                 |
| artery:   Pulmonary artery is normal. normal right-sided pressure      |                 |
| Aortic valve:   Aortic valve is trileaflet with mild thickening and    |                 |
| opens   normally.   Mild aortic valve insufficiency  Mitral valve:     |                 |
|   Mitral valve is normal. mild mitral valve regurgitation  Pulmonic    |                 |
| valve:   Pulmonic valve is normal.  Tricuspid valve:   Tricuspid valve |                 |
|  is normal. mild tricuspid valve   regurgitation  Vena cava: Not seen  |                 |
|        IMPRESSIONS:  1.   Mild left atrial dilatation.  2.   Normal    |                 |
| left ventricular size, wall thickness and motion.   Preserved   left   |                 |
| ventricular systolic function.   LVEF is 70-75%.  3.   Grade 1 left    |                 |
| ventricular diastole dysfunction.  4.   Mild tricuspid valve           |                 |
| regurgitation.  5.   Mild thickened trileaflet aortic valve with       |                 |
| adequate opening.   A mild   aortic valve insufficiency.  6.   Normal  |                 |
| right-sided pressure.              Measurements:  Height: 66  Weight:  |                 |
| 277  Aortic root:   37 mm  Aortic cusp sep:   18 mm  LA:   48 mm       |                 |
| IVS-diastole:   11 mm  IVS-systole:   20 mm  LVPW diastole:   11 mm    |                 |
| LVPW systole:   18 mm  LV diameter-diastole:   52 mm  LV               |                 |
| diameter-systole:   28 mm  Fractional shortenin %  PFV aortic   |                 |
| valve:    m/s  MPG mitral valve:    mmHg  PFV TR jet:   2.25 m/s       |                 |
| RA/RV PP mmHg  LA volume:   50 mL  LA index:   22 mL/m2  Mitral |                 |
|  Inflow DT:   290 ms  IVRT:   110 ms  Valsalva:   NOT NEEDED  PWDTI S  |                 |
| wave:   6.7 cm/s  PWDTI E wave:   5.6 cm/s  PWDTI A wave:   5.0 cm/s   |                 |
| E/A Ratio:   1.120  E/E Ratio:   9.45              Signed by:          |                 |
| Popeye Townsend MD [facility]     4/15/2014   16:02           Sonographer: |                 |
|    Jn Zilliox, RDCS, RVT, RDMS                                    |                 |
+------------------------------------------------------------------------+-----------------+
 
 
 
+-------------------------------------------------------------------------------------------
----------------------------------+
| Procedure Note                                                                            
                                  |
+-------------------------------------------------------------------------------------------
----------------------------------+
|   Popeye Townsend MD - 04/15/2014  4:20 PM [facility]            
                                  |
| CENTERECHOCARDIOGRAM REPORTSTUDY DATE:  4/15/2014PATIENT NAME: Viviana RicciBRIANNA:         
                                  |
| 1945MRN: [id number]: Marzena Moser, DOCLINICAL HISTORY/DIAGNOSIS:  PULM    
                                  |
| HTNA transthoracic echocardiogram with M-mode, pulsed-wave and color Doppler was          
                                  |
| performed with standard views obtained.  The technical quality of this examination is     
                                  |
| adequate.  The heart rhythm during the echo is sinus rhythm.  The M-mode,                 
                                  |
| two-dimensional, color flow and spectral Doppler data were reviewed and support the       
                                  |
| following interpretation:Interpretation:Left Atrium: Mildly dilatedLeft ventricle:  Left  
                                  |
|  ventricular size is normal with normal wall thickness and motion, and normal left        
                                  |
| ventricular systolic function.  The estimated ejection fraction is 70-75 %.  Grade 1      
                                  |
| left ventricular diastolic dysfunction. Aortic root: Aortic root is normal.Right Atrium:  
 
                                  |
|   Right atrial sizes normal.  Normal right-sided pressure.Right ventricle:  Right         
                                  |
| ventricular size is normal with normal wall thickness and normal right ventricular        
                                  |
| systolic function.Pericardium:  Pericardium is normal.Pulmonary artery:  Pulmonary        
                                  |
| artery is normal. normal right-sided pressureAortic valve:  Aortic valve is trileaflet    
                                  |
| with mild thickening and opens normally.  Mild aortic valve insufficiencyMitral valve:    
                                  |
| Mitral valve is normal. mild mitral valve regurgitationPulmonic valve:  Pulmonic valve    
                                  |
| is normal.Tricuspid valve:  Tricuspid valve is normal. mild tricuspid valve               
                                  |
| regurgitationVena cava: Not seenIMPRESSIONS:1.  Mild left atrial dilatation.2.  Normal    
                                  |
| left ventricular size, wall thickness and motion.  Preserved left ventricular systolic    
                                  |
| function.  LVEF is 70-75%.3.  Grade 1 left ventricular diastole dysfunction.4.  Mild      
                                  |
| tricuspid valve regurgitation.5.  Mild thickened trileaflet aortic valve with adequate    
                                  |
| opening.  A mild aortic valve insufficiency.6.  Normal right-sided                        
                                  |
| pressure.Measurements:Height: 66Weight: 277Aortic root:  37 mmAortic cusp sep:  18 mmLA:  
                                  |
|   48 mmIVS-diastole:  11 mmIVS-systole:  20 mmLVPW diastole:  11 mmLVPW systole:  18      
                                  |
| mmLV diameter-diastole:  52 mmLV diameter-systole:  28 mmFractional shortenin %PFV  
                                  |
|  aortic valve:   m/sMPG mitral valve:   mmHgPFV TR jet:  2.25 m/Brooke/RV PP mmHgLA    
                                  |
| volume:  50 mLLA index:  22 mL/w0Nyliti Inflow DT:  290 msIVRT:  110 msValsalva:  NOT     
                                  |
| NEEDEDPWDTI S wave:  6.7 cm/sPWDTI E wave:  5.6 cm/sPWDTI A wave:  5.0 cm/sE/A Ratio:     
                                  |
| 1.120E/E Ratio:  9.45Signed by:   Popeye Townsend MD [facility]  4/15/2014  16:02             
                                  |
| Sonographer:  Jn Trinh RDCS, RVT, GERRI                                            
                                  |
|IMPRESSIONS:                                                                               
                                 |
|1.  Mild left atrial dilatation.                                                           
                                  |
|2.  Normal left ventricular size, wall thickness and motion.  Preserved left ventricular sy
stolic function.  LVEF is 70-75%. |
|3.  Grade 1 left ventricular diastole dysfunction.                                         
                                  |
|4.  Mild tricuspid valve regurgitation.                                                    
                                  |
|5.  Mild thickened trileaflet aortic valve with adequate opening.  A mild aortic valve insu
fficiency.                        |
|6.  Normal right-sided pressure.                                                           
                                  |
|                                                                                           
 
                                  |
|Measurements:                                                                              
                                 |
|Height: 66                                                                                 
                                  |
|Weight: 277                                                                                
                                  |
|Aortic root:  37 mm                                                                        
                                  |
|Aortic cusp sep:  18 mm                                                                    
                                  |
|LA:  48 mm                                                                                 
                                  |
|IVS-diastole:  11 mm                                                                       
                                  |
|IVS-systole:  20 mm                                                                        
                                  |
|LVPW diastole:  11 mm                                                                      
                                  |
|LVPW systole:  18 mm                                                                       
                                  |
|LV diameter-diastole:  52 mm                                                               
                                  |
|LV diameter-systole:  28 mm                                                                
                                  |
|Fractional shortenin %                                                               
                                  |
|PFV aortic valve:   m/s                                                                    
                                  |
|MPG mitral valve:   mmHg                                                                   
                                  |
|PFV TR jet:  2.25 m/s                                                                      
                                  |
|RA/RV PP mmHg                                                                        
                                  |
|LA volume:  50 mL                                                                          
                                  |
|LA index:  22 mL/m2                                                                        
                                  |
|Mitral Inflow DT:  290 ms                                                                  
                                  |
|IVRT:  110 ms                                                                              
                                  |
|Valsalva:  NOT NEEDED                                                                      
                                  |
|PWDTI S wave:  6.7 cm/s                                                                    
                                  |
|PWDTI E wave:  5.6 cm/s                                                                    
                                  |
|PWDTI A wave:  5.0 cm/s                                                                    
                                  |
|E/A Ratio:  1.120                                                                          
                                  |
|E/E Ratio:  9.45                                                                           
 
                                  |
|Signed by:   Popeye Townsend MD [facility]                                                     
                                  |
|  4/15/2014  16:02                                                                         
                                  |
|Sonographer:  Jn Trinh RDCS, DELICIA, RDMS                                             
                                  |
+-------------------------------------------------------------------------------------------
----------------------------------+
 
 
 
+----------------------+---------------------+--------------------+----------------+
| Performing           | Address             | City/State/Zipcode | Phone Number   |
| Organization         |                     |                    |                |
+----------------------+---------------------+--------------------+----------------+
|   [facility]     |   [address] | [place]    |   [phone] |
| [facility]  |                     | [id number]              |                |
| - IMAGING            |                     |                    |                |
+----------------------+---------------------+--------------------+----------------+
 documented in this encounter
 
 Visit Diagnoses
 
 
+----------------------------------------------------------------------------------+
| Diagnosis                                                                        |
+----------------------------------------------------------------------------------+
|   Pulmonary hypertension (HCC) - Primary  Other chronic pulmonary heart diseases |
+----------------------------------------------------------------------------------+
|   Cough                                                                          |
+----------------------------------------------------------------------------------+
|   Dyspnea  Other dyspnea and respiratory abnormality                             |
+----------------------------------------------------------------------------------+
 documented in this encounter

## 2020-01-08 NOTE — XMS
Encounter Summary
  Created on: 2020
 
 Viviana Ricci
 External Reference #: [id number]
 : 47
 Sex: Female
 
 Demographics
 
 
+-----------------------+----------------------+
| Address               | [address]      |
|                       | [place]  [address] |
+-----------------------+----------------------+
| Home Phone            | [phone]      |
+-----------------------+----------------------+
| Preferred Language    | Unknown              |
+-----------------------+----------------------+
| Marital Status        | Single               |
+-----------------------+----------------------+
| Uats[employer] Affiliation | [id number]                 |
+-----------------------+----------------------+
| Race                  | Unknown              |
+-----------------------+----------------------+
| Ethnic Group          | Unknown              |
+-----------------------+----------------------+
 
 
 Author
 
 
+--------------+--------------------------------------------+
| Author       | [facility]  |
|              | and [facility]                                |
+--------------+--------------------------------------------+
| Organization | [facility]  |
|              | and [facility]                                |
+--------------+--------------------------------------------+
| Address      | Unknown                                    |
+--------------+--------------------------------------------+
| Phone        | Unavailable                                |
+--------------+--------------------------------------------+
 
 
 
 Support
 
 
+----------------+--------------+---------------------+-----------------+
| Name           | Relationship | Address             | Phone           |
+----------------+--------------+---------------------+-----------------+
| Ada/Ed Radhames | ECON         | [address]              | [phone] |
|                |              | [place], [place]  |                 |
|                |              |  [id number]              |                 |
+----------------+--------------+---------------------+-----------------+
 
 
 
 
 Care Team Providers
 
 
+-----------------------+------+-------------+
| Care Team Member Name | Role | Phone       |
+-----------------------+------+-------------+
 PCP  | Unavailable |
+-----------------------+------+-------------+
 
 
 
 Reason for Visit
 Evaluate & Treat (Routine)
 
+--------+--------+------------+--------------+--------------+---------------+
| Status | Reason | Specialty  | Diagnoses /  | Referred By  | Referred To   |
|        |        |            | Procedures   | Contact      | Contact       |
+--------+--------+------------+--------------+--------------+---------------+
| Closed |        | Nephrology |   Diagnoses  |   Stroemel,  |   Stroemel,   |
|        |        |            |              | Marzena CLIFTON,   | Marzena CLIFTON DO |
|        |        |            | Complication | DO  [address] |   [address]    |
|        |        |            | s of         |  [place] | [place]   |
|        |        |            | transplanted |  [address]  [place]  | [address]  [place]    |
|        |        |            |  kidney      | [place]    | [place]     |
|        |        |            | Unspecified  | [address]        | [address]  Phone: |
|        |        |            | hypertensive | Phone:       |  [phone] |
|        |        |            |  kidney      | [phone] |   Fax:        |
|        |        |            | disease with |   Fax:       | [phone]  |
|        |        |            |  chronic     | [phone] |               |
|        |        |            | kidney       |              |               |
|        |        |            | disease      |              |               |
|        |        |            | stage I      |              |               |
|        |        |            | through      |              |               |
|        |        |            | stage IV, or |              |               |
|        |        |            |              |              |               |
|        |        |            | unspecified( |              |               |
|        |        |            | 403.90)      |              |               |
|        |        |            | Chronic      |              |               |
|        |        |            | glomerulonep |              |               |
|        |        |            | hritis with  |              |               |
|        |        |            | lesion of    |              |               |
|        |        |            | membranous   |              |               |
|        |        |            | glomerulonep |              |               |
|        |        |            | hritis       |              |               |
|        |        |            | Unspecified  |              |               |
|        |        |            | essential    |              |               |
|        |        |            | hypertension |              |               |
|        |        |            |   Procedures |              |               |
|        |        |            |   IA OFFICE  |              |               |
|        |        |            | OUTPATIENT   |              |               |
|        |        |            | VISIT 25     |              |               |
|        |        |            | MINUTES      |              |               |
+--------+--------+------------+--------------+--------------+---------------+
 
 
 
 
 Encounter Details
 
 
 
+--------+-----------+---------------------+----------------------+----------------------+
| Date   | Type      | Department          | Care Team            | Description          |
+--------+-----------+---------------------+----------------------+----------------------+
| / | Off-Site  |   [facility]         |   Marzena Moser  | FSGS (focal          |
| 2015   | Visit     | NEPHROLOGY  [address]   | M, DO  [address]      | segmental            |
|        |           | [address]   | [place]      | glomerulosclerosis)  |
|        |           | [place], [place]     | [place]      | (Primary Dx);        |
|        |           | [address]          | [address]  [phone]  | Unspecified          |
|        |           | [phone]        |  [phone] (Fax)  | hypertensive kidney  |
|        |           |                     |                      | disease with chronic |
|        |           |                     |                      |  kidney disease      |
|        |           |                     |                      | stage I through      |
|        |           |                     |                      | stage IV, or         |
|        |           |                     |                      | unspecified;         |
|        |           |                     |                      | Diabetes mellitus    |
|        |           |                     |                      | type II,             |
|        |           |                     |                      | uncontrolled (HCC);  |
|        |           |                     |                      | Kidney replaced by   |
|        |           |                     |                      | transplant           |
+--------+-----------+---------------------+----------------------+----------------------+
 
 
 
 Social History
 
 
+--------------+-------+-----------+--------+------+
| Tobacco Use  | Types | Packs/Day | Years  | Date |
|              |       |           | Used   |      |
+--------------+-------+-----------+--------+------+
| Never Smoker |       |           |        |      |
+--------------+-------+-----------+--------+------+
 
 
 
+---------------------+---+---+---+
| Smokeless Tobacco:  |   |   |   |
| Never Used          |   |   |   |
+---------------------+---+---+---+
 
 
 
+---------------------------------------------------------------+
| Comments: some second hand smoke exposure, but fairly minimal |
+---------------------------------------------------------------+
 
 
 
+-------------+-------------+---------+----------+
| Alcohol Use | Drinks/Week | oz/Week | Comments |
+-------------+-------------+---------+----------+
| No          |             |         |          |
+-------------+-------------+---------+----------+
 
 
 
+------------------+---------------+
| Sex Assigned at  | Date Recorded |
| Birth            |               |
 
+------------------+---------------+
| Not on file      |               |
+------------------+---------------+
 
 
 
+----------------+-------------+-------------+
| Job Start Date | Occupation  | Industry    |
+----------------+-------------+-------------+
| Not on file    | Not on file | Not on file |
+----------------+-------------+-------------+
 
 
 
+----------------+--------------+------------+
| Travel History | Travel Start | Travel End |
+----------------+--------------+------------+
 
 
 
+-------------------------------------+
| No recent travel history available. |
+-------------------------------------+
 documented as of this encounter
 
 Last Filed Vital Signs
 
 
+-------------------+---------------------+----------------------+----------+
| Vital Sign        | Reading             | Time Taken           | Comments |
+-------------------+---------------------+----------------------+----------+
| Blood Pressure    | 140/80              | 2015  1:05 PM  |          |
|                   |                     | PST                  |          |
+-------------------+---------------------+----------------------+----------+
| Pulse             | -                   | -                    |          |
+-------------------+---------------------+----------------------+----------+
| Temperature       | 36.4   C (97.6   F) | 2015  1:05 PM  |          |
|                   |                     | PST                  |          |
+-------------------+---------------------+----------------------+----------+
| Respiratory Rate  | -                   | -                    |          |
+-------------------+---------------------+----------------------+----------+
| Oxygen Saturation | -                   | -                    |          |
+-------------------+---------------------+----------------------+----------+
| Inhaled Oxygen    | -                   | -                    |          |
| Concentration     |                     |                      |          |
+-------------------+---------------------+----------------------+----------+
| Weight            | 125.2 kg (276 lb)   | 2015  1:05 PM  |          |
|                   |                     | PST                  |          |
+-------------------+---------------------+----------------------+----------+
| Height            | -                   | -                    |          |
+-------------------+---------------------+----------------------+----------+
| Body Mass Index   | 44.55               | 2014 12:58 PM  |          |
|                   |                     | PST                  |          |
+-------------------+---------------------+----------------------+----------+
 documented in this encounter
 
 Progress Notes
 Marzena Moser DO - 2015 11:31 AM PSTFormatting of this note might be different
 from the original.
 Subjective:  NEPHROLOGY 
 
  
 Patient ID: Viviana Ricci is a 69 y.o. female.
 
 HPI Comments: 
 Followup for this 70 YO  white female s/p renal allograft, 05, with remote  allograft 
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
demia, SHPTH,  previous low back pain, obesity/JACK, chronic pulmonary  hypertension, histori
izabela related to centripetal obesity, and  CAD, s/p CABG x3 vessels,. 
 
 She is in very good spirits today.  She inquires about a "gastric sleeve" procedure to zcahary duarte with her weight loss.  She had heavy pyuria last week on her UA, and the culture grew out
 >10-5th klebsiella.  She has not had chills or dysuria , however. 
 
 MEDS:
 
 Prograf 1.5 mg, BID
 Mycophenolate 250 mg, TID.
 Prednisone 5 mg, daily.
 Outpatient Prescriptions Marked as Taking for the 2/2/15 encounter (Off-Site Visit) with Maye Moser,  
 Medication Sig Dispense Refill 
   allopurinol (ZYLOPRIM) 100 mg tablet Take 1 tablet by mouth Daily.  30 tablet  11 
   aspirin 81 MG EC tablet Take 81 mg by mouth Daily.     
   cholecalciferol (VITAMIN D-3) 2000 UNITS TABS Take 5,000 Units by mouth Every other day
.     
   cinacalcet (SENSIPAR) 30 mg tablet Take 1 tablet by mouth Daily.  30 tablet  12 
   ciprofloxacin (CIPRO) 250 mg tablet Take 1 tablet by mouth 2 times daily for 7 days.  1
4 tablet  0 
   fludrocortisone (FLORINEF) 0.1 mg tablet Take 1 tablet by mouth Every other day.  45 ta
blet  2 
   fluticasone (FLOVENT HFA) 220 mcg/puff inhaler Inhale 1 puff into the lungs 2 times shante
ly. Rinse mouth after use.  1 Inhaler  11 
   furosemide (LASIX) 40 mg tablet Take 40 mg by mouth Daily as needed.     
   glucose blood VI test strips (ONE TOUCH ULTRA TEST) strip Check blood sugar before each
 meal and as directed  100 each  12 
   insulin glargine (LANTUS) 100 units/mL injection Inject 20 Units under the skin every m
orning.  10 mL  11 
   insulin lispro (HUMALOG) 100 units/mL injection Inject subcutaneously before meals acco
rding to sliding scale  10 vial  11 
   Insulin Syringe-Needle U-100 (BD INSULIN SYRINGE ULTRAFINE) 31G X 5/16" 0.5 ML MISC Use
 before meals and as directed.  100 each  11 
   levothyroxine (LEVOTHROID) 50 mcg tablet Take 1 tablet by mouth Daily.  30 tablet  11 
   lisinopril (PRINIVIL,ZESTRIL) 30 MG tablet Take 1 tablet by mouth Daily.  90 tablet  3 
   loperamide (ANTI-DIARRHEAL) 2 mg capsule Take 1 capsule by mouth 4 times daily as neede
d.  60 capsule  5 
   losartan (COZAAR) 50 mg tablet Take 1 tablet by mouth Daily.  90 tablet  4 
   magnesium oxide (MAG-OX) 400 mg tablet Take 1 tablet by mouth 2 times daily.  62 tablet
  12 
   metoprolol tartrate (LOPRESSOR) 25 mg tablet Take 1 tablet by mouth 2 times daily.  60 
tablet  11 
   omeprazole (PRILOSEC) 20 mg capsule Take one capsule by mouth once daily on an empty st
omach  90 capsule  3 
   Prenatal Multivit-Min-Fe-FA (PRENATAL VITAMINS) 0.8 MG TABS Take 0.8 mg by mouth Daily.
  30 each  11 
   Respiratory Therapy Supplies MISC Decrease CPAP to 12-18 cmH2O Diagnosis Code(s)327.23.
 Please send order to [facility].  1 each  0 
   rosuvastatin (CRESTOR) 20 mg tablet Take 1 tablet by mouth nightly.  30 tablet  11 
 
 No Known Allergies
 
 
 Objective:   Blood pressure 140/80, temperature 36.4 C (97.6 F), weight 125.193 kg (276
 lb).
 weight = refused.
  
 Physical Exam
 
 HEENT: No thrush.
 Heart: Regular rate and rhythm, with no S3, S4, murmur or rub.
 Lungs:   CTA bilaterally, no rales or wheezes.
 Abdomen:  Soft, obese, the renal allograft in RLQ is nontender, normoactive bowel sounds.
 Extremities:  no clubbing, cyanosis, (+) trace edema.  No foot ulcers.
 
 Lab Results 
 Component Value Date 
  NAEX 139 2015 
  KEX 5.1 2015 
  CLEX 112 2015 
  CO2EX 17 2015 
  CREEX 1.2 2015 
  EGFREX 45 2015 
  GLUEX 150 2015 
  PHOSEX 2.5 2015 
  MGEX 1.6 2015 
  PTHEX 187.8 2015 
  SZL6YMY 7.3* 2014 
 
 Lab Results 
 Component Value Date 
  CHOLEX 161 2015 
  HDLEX 56.2 2015 
  LDLEX 58 2015 
  TRIGEX 232 2015 
 
 Lab Results 
 Component Value Date 
  WBCEX 5.5 2015 
  HGBEX 13.3 2015 
  HCTEX 41.1 2015 
  PLTEX 158 2015 
 
 Lab Results 
 Component Value Date 
  TACROLIMUSEX 5.5 2015 
 
 Lab Results 
 Component Value Date 
  UAEX negative 2015 
  UAEX normal 2015 
  UAEX negative 2015 
  UAEX 6 2015 
  UAEX 25 mg/dl 2015 
  UAEX 10 2015 
  UAEX 1.011 2015 
  UAEX 500 2015 
 
 Assessment: 
 
 1.  Renal Allograft--allograft function is stable.
 2.  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
 3.  Type 2 DM--recent Hba1c not done.  Will reorder.
 
 4.  Hyperlipidemia--stable on moderate intensity rosuvastatin.
 5.  Hypertension--good control.
 6.  SHPTH--stable.  
 7.  Obesity/JACK/Pulmonary hypertension-- RHF has improved last 5-6 years?
 8.  CAD, s/p CABG, --stable on ASA, metoprolol, atorvastatin.
 9.  Type IV RTA--stable.
 10. Chronic Low Back pain--in remission.
 11.  worsening DJD, left knee--about same.
 12.  UTI in allograft--resolving.
 
 Plan: 
 
  
 1.   She will finish a 7 course of the Cipro.
 2.  Need to ensure that a Hba1c is included on her standing order.
 3.  She is on concurrent Rx with both an ACEI, and ARB, losartan, but has very good CHF and
 BP control. I am very reticent to alter this as she has done well on this regimen.
 4.  I had a lyric discussion with her that bariatric surgery may be realistic for her if de
eded an acceptable candidate at an experienced center.   To a large degree, her DJD, Type 2 
DM, and JACK are related to her centripetal obesity.
 5.  Will plan on seeing her back in 6 mo. at the [facility].   She will 
continue to do her Standing Order Q 3-4 months
 
 CC:    Jose David Dalal M.D., [facility], [facility]
            Edgar Sanders MD, [facility], Orthopedics
 
 Electronically signed by Marzena Moser DO at 2015 12:11 PM PSTdocumented in thi
s encounter
 
 Plan of Treatment
 
 
+--------+-----------+------------+----------------------+-------------------+
| Date   | Type      | Specialty  | Care Team            | Description       |
+--------+-----------+------------+----------------------+-------------------+
| / | Office    | Cardiology |   Tonia Wilson,    |                   |
|    | Visit     |            | ARNP  [address]   |                   |
|        |           |            | [place], [place]  |                   |
|        |           |            | [address] [phone]  |                   |
|        |           |            |  [phone] (Fax)  |                   |
+--------+-----------+------------+----------------------+-------------------+
| / | Hospital  | Radiology  |   Popeye Townsend, |                   |
2020   | Encounter |            |  MD  [address] |                   |
|        |           |            |  [facility]   |                   |
|        |           |            | [place] [address]             |                   |
|        |           |            | [phone]         |                   |
|        |           |            | [phone] (Fax)   |                   |
+--------+-----------+------------+----------------------+-------------------+
| / | Surgery   | Radiology  |   Popeye Townsend, | CV EP PPM SYSTEM  |
|    |           |            |  MD  [address] | IMPLANT           |
|        |           |            |  [place],   |                   |
|        |           |            | [place] [address]             |                   |
|        |           |            | [phone]         |                   |
|        |           |            | [phone] (Fax)   |                   |
+--------+-----------+------------+----------------------+-------------------+
| 02/10/ | Clinical  | Cardiology |                      |                   |
|    | Support   |            |                      |                   |
+--------+-----------+------------+----------------------+-------------------+
| / | Office    | Cardiology |   Tonia Wilson,    |                   |
|    | Visit     |            | [facility]  [address]   |                   |
 
|        |           |            | [place]  |                   |
|        |           |            | [address]  [phone]  |                   |
|        |           |            |  [phone] (Fax)  |                   |
+--------+-----------+------------+----------------------+-------------------+
| / | Off-Site  | Nephrology |   Marzena Moser  |                   |
|    | Visit     |            | DO [place]  [address] [place] [address]      |                   |
|        |           |            | [address]      |                   |
|        |           |            | [place]      |                   |
|        |           |            | [address]  [phone]  |                   |
|        |           |            |  [phone] (Fax)  |                   |
+--------+-----------+------------+----------------------+-------------------+
 documented as of this encounter
 
 Visit Diagnoses
 
 
+-----------------------------------------------------------------------------------------+
| Diagnosis                                                                               |
+-----------------------------------------------------------------------------------------+
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
| lesion of membranous glomerulonephritis                                                 |
+-----------------------------------------------------------------------------------------+
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
| stage IV, or unspecified(403.90)  Unspecified hypertensive kidney disease with chronic  |
| kidney disease stage I through stage IV, or unspecified                                 |
+-----------------------------------------------------------------------------------------+
|   Diabetes mellitus type II, uncontrolled (HCC)  Type II or unspecified type diabetes   |
| mellitus without mention of complication, uncontrolled                                  |
+-----------------------------------------------------------------------------------------+
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
+-----------------------------------------------------------------------------------------+
 documented in this encounter

## 2020-01-08 NOTE — XMS
Encounter Summary
  Created on: 2020
 
 Viviana Ricci
 External Reference #: [id number]
 : 47
 Sex: Female
 
 Demographics
 
 
+-----------------------+----------------------+
| Address               | [address]      |
|                       | [place]  [address] |
+-----------------------+----------------------+
| Home Phone            | [phone]      |
+-----------------------+----------------------+
| Preferred Language    | Unknown              |
+-----------------------+----------------------+
| Marital Status        | Single               |
+-----------------------+----------------------+
| Church Affiliation | [id number]                 |
+-----------------------+----------------------+
| Race                  | Unknown              |
+-----------------------+----------------------+
| Ethnic Group          | Unknown              |
+-----------------------+----------------------+
 
 
 Author
 
 
+--------------+--------------------------------------------+
| Author       | [facility]  |
|              | and [place]                                |
+--------------+--------------------------------------------+
| Organization | [facility] [place]  |
|              | and [place]                                |
+--------------+--------------------------------------------+
| Address      | Unknown                                    |
+--------------+--------------------------------------------+
| Phone        | Unavailable                                |
+--------------+--------------------------------------------+
 
 
 
 Support
 
 
+----------------+--------------+---------------------+-----------------+
| Name           | Relationship | Address             | Phone           |
+----------------+--------------+---------------------+-----------------+
| Ada/Ed Radhames | ECON         | [address]              | [phone] |
|                |              | [place]  |                 |
|                |              |  [address]              |                 |
+----------------+--------------+---------------------+-----------------+
 
 
 
 
 Care Team Providers
 
 
+------------------------+------+-----------------+
| Care Team Member Name  | Role | Phone           |
+------------------------+------+-----------------+
| Marzena Moser DO | PCP  | [phone] |
+------------------------+------+-----------------+
 
 
 
 Reason for Visit
 
 
+-------------+----------+
| Reason      | Comments |
+-------------+----------+
| Appointment |          |
+-------------+----------+
 
 
 
 Encounter Details
 
 
+--------+-----------+----------------------+---------------------+-------------+
| Date   | Type      | Department           | Care Team           | Description |
+--------+-----------+----------------------+---------------------+-------------+
| 10/14/ | Telephone |   [facility]          |   Heydi Kirkland        | Appointment |
|    |           | PULMONARY  [address]     | MD Onofre  [address]  |             |
|        |           | [address]  [place], | [address]  [place]    |             |
|        |           |  [place] [address]       | [place] [address]     |             |
|        |           | [phone]         | [phone]        |             |
|        |           |                      | [phone] (Fax)  |             |
+--------+-----------+----------------------+---------------------+-------------+
 
 
 
 Social History
 
 
+--------------+-------+-----------+--------+------+
| Tobacco Use  | Types | Packs/Day | Years  | Date |
|              |       |           | Used   |      |
+--------------+-------+-----------+--------+------+
| Never Smoker |       |           |        |      |
+--------------+-------+-----------+--------+------+
 
 
 
+---------------------+---+---+---+
| Smokeless Tobacco:  |   |   |   |
| Never Used          |   |   |   |
+---------------------+---+---+---+
 
 
 
+---------------------------------------------------------------+
| Comments: some second hand smoke exposure, but fairly minimal |
 
+---------------------------------------------------------------+
 
 
 
+-------------+-------------+---------+----------+
| Alcohol Use | Drinks/Week | oz/Week | Comments |
+-------------+-------------+---------+----------+
| No          |             |         |          |
+-------------+-------------+---------+----------+
 
 
 
+------------------+---------------+
| Sex Assigned at  | Date Recorded |
| Birth            |               |
+------------------+---------------+
| Not on file      |               |
+------------------+---------------+
 
 
 
+----------------+-------------+-------------+
| Job Start Date | Occupation  | Industry    |
+----------------+-------------+-------------+
| Not on file    | Not on file | Not on file |
+----------------+-------------+-------------+
 
 
 
+----------------+--------------+------------+
| Travel History | Travel Start | Travel End |
+----------------+--------------+------------+
 
 
 
+-------------------------------------+
| No recent travel history available. |
+-------------------------------------+
 documented as of this encounter
 
 Plan of Treatment
 
 
+--------+-----------+------------+----------------------+-------------------+
| Date   | Type      | Specialty  | Care Team            | Description       |
+--------+-----------+------------+----------------------+-------------------+
| / | Office    | Cardiology |   Hellberg, Tonia,    |                   |
|    | Visit     |            | ARNJESSICA  [address]   |                   |
|        |           |            | St  [place], [place]  |                   |
|        |           |            | [address]  [phone]  |                   |
|        |           |            |  [phone] (Fax)  |                   |
+--------+-----------+------------+----------------------+-------------------+
| / | Hospital  | Radiology  |   Popeye Townsend, |                   |
|    | Encounter |            |  MD  [address] |                   |
|        |           |            |  St.  [place],   |                   |
|        |           |            | [place] [address]             |                   |
|        |           |            | [phone]         |                   |
|        |           |            | [phone] (Fax)   |                   |
+--------+-----------+------------+----------------------+-------------------+
| / | Surgery   | Radiology  |   Popeye Townsend, | CV EP PPM SYSTEM  |
 
|    |           |            |  MD  [address] | IMPLANT           |
|        |           |            |  St.  [place],   |                   |
|        |           |            | [place] [address]             |                   |
|        |           |            | [phone]         |                   |
|        |           |            | [phone] (Fax)   |                   |
+--------+-----------+------------+----------------------+-------------------+
| 02/10/ | Clinical  | Cardiology |                      |                   |
|    | Support   |            |                      |                   |
+--------+-----------+------------+----------------------+-------------------+
| / | Office    | Cardiology |   Tonia Wilson,    |                   |
|    | Visit     |            | BELKIS  [address]   |                   |
|        |           |            | [place]  |                   |
|        |           |            | [address]  [phone]  |                   |
|        |           |            |  [phone] (Fax)  |                   |
+--------+-----------+------------+----------------------+-------------------+
| / | Off-Site  | Nephrology |   Marzena Moser  |                   |
|    | Visit     |            | DO ETIENNE  [address] [place] [address]      |                   |
|        |           |            | [address]      |                   |
|        |           |            | [place]      |                   |
|        |           |            | [address] [phone]  |                   |
|        |           |            |  [phone] (Fax)  |                   |
+--------+-----------+------------+----------------------+-------------------+
 documented as of this encounter
 
 Visit Diagnoses
 Not on filedocumented in this encounter

## 2020-01-08 NOTE — XMS
Encounter Summary
  Created on: 2020
 
 Viviana Ricci
 External Reference #: [id number]
 : 47
 Sex: Female
 
 Demographics
 
 
+-----------------------+----------------------+
| Address               | [address]      |
|                       | [place]  [address] |
+-----------------------+----------------------+
| Home Phone            | [phone]      |
+-----------------------+----------------------+
| Preferred Language    | Unknown              |
+-----------------------+----------------------+
| Marital Status        | Single               |
+-----------------------+----------------------+
| Gnosticist Affiliation | [id number]                 |
+-----------------------+----------------------+
| Race                  | Unknown              |
+-----------------------+----------------------+
| Ethnic Group          | Unknown              |
+-----------------------+----------------------+
 
 
 Author
 
 
+--------------+--------------------------------------------+
| Author       | [facility] [place]  |
|              | and [place]                                |
+--------------+--------------------------------------------+
| Organization | [facility] [place]  |
|              | and [place]                                |
+--------------+--------------------------------------------+
| Address      | Unknown                                    |
+--------------+--------------------------------------------+
| Phone        | Unavailable                                |
+--------------+--------------------------------------------+
 
 
 
 Support
 
 
+----------------+--------------+---------------------+-----------------+
| Name           | Relationship | Address             | Phone           |
+----------------+--------------+---------------------+-----------------+
| Ada/Ed Radhames | ECON         | [address]              | [phone] |
|                |              | [place]  |                 |
|                |              |  [address]              |                 |
+----------------+--------------+---------------------+-----------------+
 
 
 
 
 Care Team Providers
 
 
+-----------------------+------+-------------+
| Care Team Member Name | Role | Phone       |
+-----------------------+------+-------------+
 PCP  | Unavailable |
+-----------------------+------+-------------+
 
 
 
 Reason for Visit
 
 
+-------------------+----------+
| Reason            | Comments |
+-------------------+----------+
| Medication Refill |          |
+-------------------+----------+
 
 
 
 Encounter Details
 
 
+--------+--------+---------------------+----------------------+-------------------+
| Date   | Type   | Department          | Care Team            | Description       |
+--------+--------+---------------------+----------------------+-------------------+
| / | Refill |   [facility]         |   Marzena Moser  | Medication Refill |
|    |        | NEPHROLOGY  [address]   | M, DO  [address]      |                   |
|        |        | [address]   | [address]      |                   |
|        |        | [place], [place]     | [place], [place]      |                   |
|        |        | [address]          | [address]  [phone]  |                   |
|        |        | [phone]        |  [phone] (Fax)  |                   |
+--------+--------+---------------------+----------------------+-------------------+
 
 
 
 Social History
 
 
+--------------+-------+-----------+--------+------+
| Tobacco Use  | Types | Packs/Day | Years  | Date |
|              |       |           | Used   |      |
+--------------+-------+-----------+--------+------+
| Never Smoker |       |           |        |      |
+--------------+-------+-----------+--------+------+
 
 
 
+---------------------+---+---+---+
| Smokeless Tobacco:  |   |   |   |
| Never Used          |   |   |   |
+---------------------+---+---+---+
 
 
 
+-------------+-------------+---------+----------+
| Alcohol Use | Drinks/Week | oz/Week | Comments |
 
+-------------+-------------+---------+----------+
| No          |             |         |          |
+-------------+-------------+---------+----------+
 
 
 
+------------------+---------------+
| Sex Assigned at  | Date Recorded |
| Birth            |               |
+------------------+---------------+
| Not on file      |               |
+------------------+---------------+
 
 
 
+----------------+-------------+-------------+
| Job Start Date | Occupation  | Industry    |
+----------------+-------------+-------------+
| Not on file    | Not on file | Not on file |
+----------------+-------------+-------------+
 
 
 
+----------------+--------------+------------+
| Travel History | Travel Start | Travel End |
+----------------+--------------+------------+
 
 
 
+-------------------------------------+
| No recent travel history available. |
+-------------------------------------+
 documented as of this encounter
 
 Plan of Treatment
 
 
+--------+-----------+------------+----------------------+-------------------+
| Date   | Type      | Specialty  | Care Team            | Description       |
+--------+-----------+------------+----------------------+-------------------+
| / | Office    | Cardiology |   Tonia Wilson,    |                   |
|    | Visit     |            | BELKIS  [address]   |                   |
|        |           |            | St  [place], [place]  |                   |
|        |           |            | [id number] [phone]  |                   |
|        |           |            |  [phone] (Fax)  |                   |
+--------+-----------+------------+----------------------+-------------------+
| / | Hospital  | Radiology  |   Cary Himanshuraul, |                   |
|    | Encounter |            |  MD  [address] |                   |
|        |           |            |  St.  [place],   |                   |
|        |           |            | [place] [address]             |                   |
|        |           |            | [phone]         |                   |
|        |           |            | [phone] (Fax)   |                   |
+--------+-----------+------------+----------------------+-------------------+
| / | Surgery   | Radiology  |   Aimeechidi Yinpeeraul, | CV EP PPM SYSTEM  |
|    |           |            |  MD  [address] | IMPLANT           |
|        |           |            |  St.  [place],   |                   |
|        |           |            | [place] [address]             |                   |
|        |           |            | [phone]         |                   |
|        |           |            | [phone] (Fax)   |                   |
+--------+-----------+------------+----------------------+-------------------+
 
| 02/10/ | Clinical  | Cardiology |                      |                   |
|    | Support   |            |                      |                   |
+--------+-----------+------------+----------------------+-------------------+
| / | Office    | Cardiology |   Tonia Wilson,    |                   |
|    | Visit     |            | ARNP  [address]   |                   |
|        |           |            | St  [place], [place]  |                   |
|        |           |            | [address]  [phone]  |                   |
|        |           |            |  [phone] (Fax)  |                   |
+--------+-----------+------------+----------------------+-------------------+
| / | Off-Site  | Nephrology |   Marzena Moser  |                   |
|    | Visit     |            | DO ETIENNE  [address] [place] [address]      |                   |
|        |           |            | [address]      |                   |
|        |           |            | [place]      |                   |
|        |           |            | [id number] [phone]  |                   |
|        |           |            |  [phone] (Fax)  |                   |
+--------+-----------+------------+----------------------+-------------------+
 documented as of this encounter
 
 Visit Diagnoses
 Not on filedocumented in this encounter

## 2020-01-08 NOTE — XMS
Encounter Summary
  Created on: 2020
 
 Viviana Ricci
 External Reference #: [id number]
 : 47
 Sex: Female
 
 Demographics
 
 
+-----------------------+----------------------+
| Address               | [address]      |
|                       | [place]  [address] |
+-----------------------+----------------------+
| Home Phone            | [phone]      |
+-----------------------+----------------------+
| Preferred Language    | Unknown              |
+-----------------------+----------------------+
| Marital Status        | Single               |
+-----------------------+----------------------+
| Orthodoxy Affiliation | 1009                 |
+-----------------------+----------------------+
| Race                  | Unknown              |
+-----------------------+----------------------+
| Ethnic Group          | Unknown              |
+-----------------------+----------------------+
 
 
 Author
 
 
+--------------+--------------------------------------------+
| Author       | [facility]  |
|              | and [facility]                                |
+--------------+--------------------------------------------+
| Organization | [facility]  |
|              | and [place]                                |
+--------------+--------------------------------------------+
| Address      | Unknown                                    |
+--------------+--------------------------------------------+
| Phone        | Unavailable                                |
+--------------+--------------------------------------------+
 
 
 
 Support
 
 
+----------------+--------------+---------------------+-----------------+
| Name           | Relationship | Address             | Phone           |
+----------------+--------------+---------------------+-----------------+
| Ada/Ed Radhames | ECON         | [address]              | [phone] |
|                |              | [place]  |                 |
|                |              |  [address]              |                 |
+----------------+--------------+---------------------+-----------------+
 
 
 
 
 Care Team Providers
 
 
+-----------------------+------+-------------+
| Care Team Member Name | Role | Phone       |
+-----------------------+------+-------------+
 PCP  | Unavailable |
+-----------------------+------+-------------+
 
 
 
 Encounter Details
 
 
+--------+-----------+----------------------+----------------------+-------------+
| Date   | Type      | Department           | Care Team            | Description |
+--------+-----------+----------------------+----------------------+-------------+
| / | [facility]  |   [facility] |   Marzena Moser  |             |
|  - | Encounter |  MED CTR MED ONC     | M, DO  [address]      |             |
|        |           | [address]  | [address]      |             |
| / |           | [place] [address] | [place]      |             |
|    |           |   [phone]       | [address]  [phone]  |             |
|        |           |                      |  [phone] (Fax)  |             |
+--------+-----------+----------------------+----------------------+-------------+
 
 
 
 Social History
 
 
+----------------+-------+-----------+--------+------+
| Tobacco Use    | Types | Packs/Day | Years  | Date |
|                |       |           | Used   |      |
+----------------+-------+-----------+--------+------+
| Never Assessed |       |           |        |      |
+----------------+-------+-----------+--------+------+
 
 
 
+------------------+---------------+
| Sex Assigned at  | Date Recorded |
| Birth            |               |
+------------------+---------------+
| Not on file      |               |
+------------------+---------------+
 
 
 
+----------------+-------------+-------------+
| Job Start Date | Occupation  | Industry    |
+----------------+-------------+-------------+
| Not on file    | Not on file | Not on file |
+----------------+-------------+-------------+
 
 
 
+----------------+--------------+------------+
| Travel History | Travel Start | Travel End |
+----------------+--------------+------------+
 
 
 
 
+-------------------------------------+
| No recent travel history available. |
+-------------------------------------+
 documented as of this encounter
 
 Plan of Treatment
 
 
+--------+-----------+------------+----------------------+-------------------+
| Date   | Type      | Specialty  | Care Team            | Description       |
+--------+-----------+------------+----------------------+-------------------+
| / | Office    | Cardiology |   Tonia Wilson,    |                   |
|    | Visit     |            | ARNJESSICA  [address]   |                   |
|        |           |            | St  [place], [place]  |                   |
|        |           |            | [address]  [phone]  |                   |
|        |           |            |  [phone] (Fax)  |                   |
+--------+-----------+------------+----------------------+-------------------+
| / | Hospital  | Radiology  |   Popeye Townsend, |                   |
|    | Encounter |            |  MD  [address] |                   |
|        |           |            |  St.  [place],   |                   |
|        |           |            | [place] [address]             |                   |
|        |           |            | [phone]         |                   |
|        |           |            | [phone] (Fax)   |                   |
+--------+-----------+------------+----------------------+-------------------+
| / | Surgery   | Radiology  |   Popeye Townsend, | CV EP PPM SYSTEM  |
|    |           |            |  MD  [address] | IMPLANT           |
|        |           |            |  St.  [place],   |                   |
|        |           |            | [place] [address]             |                   |
|        |           |            | [phone]         |                   |
|        |           |            | [phone] (Fax)   |                   |
+--------+-----------+------------+----------------------+-------------------+
| 02/10/ | Clinical  | Cardiology |                      |                   |
|    | Support   |            |                      |                   |
+--------+-----------+------------+----------------------+-------------------+
| / | Office    | Cardiology |   Tonia Wilson,    |                   |
|    | Visit     |            | ARNP  [address]   |                   |
|        |           |            | [place]  |                   |
|        |           |            | [address]  [phone]  |                   |
|        |           |            |  [phone] (Fax)  |                   |
+--------+-----------+------------+----------------------+-------------------+
| / | Off-Site  | Nephrology |   Marzena Moser  |                   |
|    | Visit     |            | DO ETIENNE  [address] [place] [address]      |                   |
|        |           |            | [address]      |                   |
|        |           |            | [place]      |                   |
|        |           |            | [address] [phone]  |                   |
|        |           |            |  [phone] (Fax)  |                   |
+--------+-----------+------------+----------------------+-------------------+
 documented as of this encounter
 
 Visit Diagnoses
 Not on filedocumented in this encounter

## 2020-01-08 NOTE — XMS
Encounter Summary
  Created on: 2020
 
 Viviana Ricci
 External Reference #: [id number]
 : 47
 Sex: Female
 
 Demographics
 
 
+-----------------------+----------------------+
| Address               | [address]      |
|                       | [place]  [address] |
+-----------------------+----------------------+
| Home Phone            | [phone]      |
+-----------------------+----------------------+
| Preferred Language    | Unknown              |
+-----------------------+----------------------+
| Marital Status        | Single               |
+-----------------------+----------------------+
| Yazidi Affiliation | 1009                 |
+-----------------------+----------------------+
| Race                  | Unknown              |
+-----------------------+----------------------+
| Ethnic Group          | Unknown              |
+-----------------------+----------------------+
 
 
 Author
 
 
+--------------+--------------------------------------------+
| Author       | [facility]  |
|              | and [place]                                |
+--------------+--------------------------------------------+
| Organization | [facility]  |
|              | and [place]                                |
+--------------+--------------------------------------------+
| Address      | Unknown                                    |
+--------------+--------------------------------------------+
| Phone        | Unavailable                                |
+--------------+--------------------------------------------+
 
 
 
 Support
 
 
+----------------+--------------+---------------------+-----------------+
| Name           | Relationship | Address             | Phone           |
+----------------+--------------+---------------------+-----------------+
| Ada/Ed Radhames | ECON         | [address]              | [phone] |
|                |              | [place]  |                 |
|                |              |  [address]              |                 |
+----------------+--------------+---------------------+-----------------+
 
 
 
 
 Care Team Providers
 
 
+-----------------------+------+-------------+
| Care Team Member Name | Role | Phone       |
+-----------------------+------+-------------+
 PCP  | Unavailable |
+-----------------------+------+-------------+
 
 
 
 Reason for Visit
 
 
+-------------------+----------+
| Reason            | Comments |
+-------------------+----------+
| Medication Refill |          |
+-------------------+----------+
 
 
 
 Encounter Details
 
 
+--------+--------+---------------------+----------------------+-------------------+
| Date   | Type   | Department          | Care Team            | Description       |
+--------+--------+---------------------+----------------------+-------------------+
| / | Refill |   [facility]         |   Marzena Moser  | Medication Refill |
| 2017   |        | NEPHROLOGY  [address]   | M, DO  [address]      |                   |
|        |        | [address]   | [address]      |                   |
|        |        | [place], [place]     | [place]      |                   |
|        |        | [address]          | [address]  [phone]  |                   |
|        |        | [phone]        |  [phone] (Fax)  |                   |
+--------+--------+---------------------+----------------------+-------------------+
 
 
 
 Social History
 
 
+--------------+-------+-----------+--------+------+
| Tobacco Use  | Types | Packs/Day | Years  | Date |
|              |       |           | Used   |      |
+--------------+-------+-----------+--------+------+
| Never Smoker |       |           |        |      |
+--------------+-------+-----------+--------+------+
 
 
 
+---------------------+---+---+---+
| Smokeless Tobacco:  |   |   |   |
| Never Used          |   |   |   |
+---------------------+---+---+---+
 
 
 
+---------------------------------------------------------------+
| Comments: some second hand smoke exposure, but fairly minimal |
 
+---------------------------------------------------------------+
 
 
 
+-------------+-------------+---------+----------+
| Alcohol Use | Drinks/Week | oz/Week | Comments |
+-------------+-------------+---------+----------+
| No          |             |         |          |
+-------------+-------------+---------+----------+
 
 
 
+------------------+---------------+
| Sex Assigned at  | Date Recorded |
| Birth            |               |
+------------------+---------------+
| Not on file      |               |
+------------------+---------------+
 
 
 
+----------------+-------------+-------------+
| Job Start Date | Occupation  | Industry    |
+----------------+-------------+-------------+
| Not on file    | Not on file | Not on file |
+----------------+-------------+-------------+
 
 
 
+----------------+--------------+------------+
| Travel History | Travel Start | Travel End |
+----------------+--------------+------------+
 
 
 
+-------------------------------------+
| No recent travel history available. |
+-------------------------------------+
 documented as of this encounter
 
 Plan of Treatment
 
 
+--------+-----------+------------+----------------------+-------------------+
| Date   | Type      | Specialty  | Care Team            | Description       |
+--------+-----------+------------+----------------------+-------------------+
| / | Office    | Cardiology |   HellalfredoTonia,    |                   |
|    | Visit     |            | ARNP  [address]   |                   |
|        |           |            | St  [place], [place]  |                   |
|        |           |            | [address]  [phone]  |                   |
|        |           |            |  [phone] (Fax)  |                   |
+--------+-----------+------------+----------------------+-------------------+
| / | Hospital  | Radiology  |   Popeye Townsend, |                   |
|    | Encounter |            |  MD  [address] |                   |
|        |           |            |  St.  [place],   |                   |
|        |           |            | [place] [address]             |                   |
|        |           |            | [phone]         |                   |
|        |           |            | [phone] (Fax)   |                   |
+--------+-----------+------------+----------------------+-------------------+
| / | Surgery   | Radiology  |   Popeye Townsend, | CV EP PPM SYSTEM  |
 
|    |           |            |  MD  [address] | IMPLANT           |
|        |           |            |  St.  [place],   |                   |
|        |           |            | [place] [address]             |                   |
|        |           |            | [phone]         |                   |
|        |           |            | [phone] (Fax)   |                   |
+--------+-----------+------------+----------------------+-------------------+
| 02/10/ | Clinical  | Cardiology |                      |                   |
|    | Support   |            |                      |                   |
+--------+-----------+------------+----------------------+-------------------+
| / | Office    | Cardiology |   Tonia Wilson,    |                   |
|    | Visit     |            | ARNP  [address]   |                   |
|        |           |            | [place]  |                   |
|        |           |            | [address]  [phone]  |                   |
|        |           |            |  [phone] (Fax)  |                   |
+--------+-----------+------------+----------------------+-------------------+
| / | Off-Site  | Nephrology |   Marzena Moser  |                   |
|    | Visit     |            | DO ETIENNE  [address] [place] [address]      |                   |
|        |           |            | [address]      |                   |
|        |           |            | [place]      |                   |
|        |           |            | [address]  [phone]  |                   |
|        |           |            |  [phone] (Fax)  |                   |
+--------+-----------+------------+----------------------+-------------------+
 documented as of this encounter
 
 Visit Diagnoses
 Not on filedocumented in this encounter

## 2020-01-08 NOTE — XMS
Encounter Summary
  Created on: 2020
 
 Viviana Ricci
 External Reference #: [id number]
 : 47
 Sex: Female
 
 Demographics
 
 
+-----------------------+----------------------+
| Address               | [address]      |
|                       | [place]  [address] |
+-----------------------+----------------------+
| Home Phone            | [phone]      |
+-----------------------+----------------------+
| Preferred Language    | Unknown              |
+-----------------------+----------------------+
| Marital Status        | Single               |
+-----------------------+----------------------+
| Voodoo Affiliation | 1009                 |
+-----------------------+----------------------+
| Race                  | Unknown              |
+-----------------------+----------------------+
| Ethnic Group          | Unknown              |
+-----------------------+----------------------+
 
 
 Author
 
 
+--------------+--------------------------------------------+
| Author       | [facility]  |
|              | and [place]                                |
+--------------+--------------------------------------------+
| Organization | [facility] [place]  |
|              | and [place]                                |
+--------------+--------------------------------------------+
| Address      | Unknown                                    |
+--------------+--------------------------------------------+
| Phone        | Unavailable                                |
+--------------+--------------------------------------------+
 
 
 
 Support
 
 
+----------------+--------------+---------------------+-----------------+
| Name           | Relationship | Address             | Phone           |
+----------------+--------------+---------------------+-----------------+
| Ada/Ed Radhames | ECON         | [address]              | [phone] |
|                |              | [place]  |                 |
|                |              |  [address]              |                 |
+----------------+--------------+---------------------+-----------------+
 
 
 
 
 Care Team Providers
 
 
+-----------------------+------+-------------+
| Care Team Member Name | Role | Phone       |
+-----------------------+------+-------------+
 PCP  | Unavailable |
+-----------------------+------+-------------+
 
 
 
 Encounter Details
 
 
+--------+----------+---------------------+----------------------+-------------+
| Date   | Type     | Department          | Care Team            | Description |
+--------+----------+---------------------+----------------------+-------------+
| / | Abstract |   [facility] [place]         |   Marzena Moser  |             |
|    |          | NEPHROLOGY  [address]   | M, DO  [address]      |             |
|        |          | [address]   | [address]      |             |
|        |          | [place], [place]     | [place]      |             |
|        |          | [address]          | [address]  [phone]  |             |
|        |          | [phone]        |  [phone] (Fax)  |             |
+--------+----------+---------------------+----------------------+-------------+
 
 
 
 Social History
 
 
+--------------+-------+-----------+--------+------+
| Tobacco Use  | Types | Packs/Day | Years  | Date |
|              |       |           | Used   |      |
+--------------+-------+-----------+--------+------+
| Never Smoker |       |           |        |      |
+--------------+-------+-----------+--------+------+
 
 
 
+---------------------+---+---+---+
| Smokeless Tobacco:  |   |   |   |
| Never Used          |   |   |   |
+---------------------+---+---+---+
 
 
 
+---------------------------------------------------------------+
| Comments: some second hand smoke exposure, but fairly minimal |
+---------------------------------------------------------------+
 
 
 
+-------------+-------------+---------+----------+
| Alcohol Use | Drinks/Week | oz/Week | Comments |
+-------------+-------------+---------+----------+
| No          |             |         |          |
+-------------+-------------+---------+----------+
 
 
 
 
+------------------+---------------+
| Sex Assigned at  | Date Recorded |
| Birth            |               |
+------------------+---------------+
| Not on file      |               |
+------------------+---------------+
 
 
 
+----------------+-------------+-------------+
| Job Start Date | Occupation  | Industry    |
+----------------+-------------+-------------+
| Not on file    | Not on file | Not on file |
+----------------+-------------+-------------+
 
 
 
+----------------+--------------+------------+
| Travel History | Travel Start | Travel End |
+----------------+--------------+------------+
 
 
 
+-------------------------------------+
| No recent travel history available. |
+-------------------------------------+
 documented as of this encounter
 
 Plan of Treatment
 
 
+--------+-----------+------------+----------------------+-------------------+
| Date   | Type      | Specialty  | Care Team            | Description       |
+--------+-----------+------------+----------------------+-------------------+
| / | Office    | Cardiology |   Tonia Wilson,    |                   |
|    | Visit     |            | ARNJESSICA  [address]   |                   |
|        |           |            | [place]  |                   |
|        |           |            | [address]  [phone]  |                   |
|        |           |            |  [phone] (Fax)  |                   |
+--------+-----------+------------+----------------------+-------------------+
| / | Hospital  | Radiology  |   Popeye Townsend, |                   |
|    | Encounter |            |  MD  [address] |                   |
|        |           |            |  St.  [place],   |                   |
|        |           |            | [place] [address]             |                   |
|        |           |            | [phone]         |                   |
|        |           |            | [phone] (Fax)   |                   |
+--------+-----------+------------+----------------------+-------------------+
| / | Surgery   | Radiology  |   Popeye Townsend, | CV EP PPM SYSTEM  |
|    |           |            |  MD  [address] | IMPLANT           |
|        |           |            |  St.  [place],   |                   |
|        |           |            | [place] [address]             |                   |
|        |           |            | [phone]         |                   |
|        |           |            | [phone] (Fax)   |                   |
+--------+-----------+------------+----------------------+-------------------+
| 02/10/ | Clinical  | Cardiology |                      |                   |
|    | Support   |            |                      |                   |
+--------+-----------+------------+----------------------+-------------------+
| / | Office    | Cardiology |   Tonia Wilson,    |                   |
|    | Visit     |            | ARNP  [address]   |                   |
 
|        |           |            | St  [place], [place]  |                   |
|        |           |            | [address]  [phone]  |                   |
|        |           |            |  [phone] (Fax)  |                   |
+--------+-----------+------------+----------------------+-------------------+
| / | Off-Site  | Nephrology |   Marzena Moser  |                   |
| 2020   | Visit     |            | DO ETIENNE  [address] [place] [address]      |                   |
|        |           |            | [address]      |                   |
|        |           |            | [place]      |                   |
|        |           |            | [address]  [phone]  |                   |
|        |           |            |  [phone] (Fax)  |                   |
+--------+-----------+------------+----------------------+-------------------+
 documented as of this encounter
 
 Procedures
 
 
+----------------+--------+------------+----------------------+----------------------+
| Procedure Name | Priori | Date/Time  | Associated Diagnosis | Comments             |
|                | ty     |            |                      |                      |
+----------------+--------+------------+----------------------+----------------------+
| EXTERNAL LAB:  | Routin | 2015 |                      |   Results for this   |
| URINALYSIS     | e      |            |                      | procedure are in the |
|                |        |            |                      |  results section.    |
+----------------+--------+------------+----------------------+----------------------+
| URINALYSIS     | Routin | 2015 |                      |   Results for this   |
|                | e      |            |                      | procedure are in the |
|                |        |            |                      |  results section.    |
+----------------+--------+------------+----------------------+----------------------+
 documented in this encounter
 
 Results
 Urinalysis (2015)
 
+-------------+--------------+---------------+-------------+--------------+
| Component   | Value        | Ref Range     | Performed   | Pathologist  |
|             |              |               | At          | Signature    |
+-------------+--------------+---------------+-------------+--------------+
| Color,      | Sisi        |               | [facility]  |              |
| Urine       |              |               | [facility]    |              |
|             |              |               | [facility]     |              |
|             |              |               | [facility] -    |              |
|             |              |               | LABORATORY  |              |
+-------------+--------------+---------------+-------------+--------------+
| Clarity     | Hazy         |               | [facility]  |              |
|             |              |               | [facility]    |              |
|             |              |               | [facility]     |              |
|             |              |               | [facility] -    |              |
|             |              |               | LABORATORY  |              |
+-------------+--------------+---------------+-------------+--------------+
| WBC UA      | 50           | /HPF          | [facility]  |              |
|             |              |               | [facility]    |              |
|             |              |               | [facility]     |              |
|             |              |               | [facility] -    |              |
|             |              |               | LABORATORY  |              |
+-------------+--------------+---------------+-------------+--------------+
| BACTERIA UA | 1+ (A)       | Negative /HPF | [facility]  |              |
|             |              |               | [facility]    |              |
|             |              |               | [facility]     |              |
|             |              |               | CENTER -    |              |
|             |              |               | LABORATORY  |              |
 
+-------------+--------------+---------------+-------------+--------------+
| Nitrite,    | Positive (A) | Negative      | [facility]  |              |
| Urine       |              |               | [facility]    |              |
|             |              |               | MEDICAL     |              |
|             |              |               | CENTER -    |              |
|             |              |               | LABORATORY  |              |
+-------------+--------------+---------------+-------------+--------------+
 
 
 
+-----------------+
| Specimen        |
+-----------------+
| Urine specimen  |
| (specimen)      |
+-----------------+
 
 
 
+----------------------+--------------------+--------------------+----------------+
| Performing           | Address            | City/State/Zipcode | Phone Number   |
| Organization         |                    |                    |                |
+----------------------+--------------------+--------------------+----------------+
|   [facility]     |   [address] | [place]    |   [phone] |
| [facility]  |                    | [address]              |                |
| - LABORATORY         |                    |                    |                |
+----------------------+--------------------+--------------------+----------------+
 External Lab: Urinalysis (2015)
 
+-------------+----------+-----------+------------+--------------+
| Component   | Value    | Ref Range | Performed  | Pathologist  |
|             |          |           | At         | Signature    |
+-------------+----------+-----------+------------+--------------+
| UA Blood,   | negative |           | EXTERNAL   |              |
| External    |          |           | LAB        |              |
+-------------+----------+-----------+------------+--------------+
| UA Glucose, | normal   |           | EXTERNAL   |              |
|  External   |          |           | LAB        |              |
+-------------+----------+-----------+------------+--------------+
| UA Ketones, | negative |           | EXTERNAL   |              |
|  External   |          |           | LAB        |              |
+-------------+----------+-----------+------------+--------------+
| UA Ph,      | 6        |           | EXTERNAL   |              |
| External    |          |           | LAB        |              |
+-------------+----------+-----------+------------+--------------+
| UA          | 150 (A)  | 0         | EXTERNAL   |              |
| Proteins,   |          |           | LAB        |              |
| External    |          |           |            |              |
+-------------+----------+-----------+------------+--------------+
| UA RBC,     | 5        |           | EXTERNAL   |              |
| External    |          |           | LAB        |              |
+-------------+----------+-----------+------------+--------------+
| UA Specific | 1.016    |           | EXTERNAL   |              |
|  Gravity,   |          |           | LAB        |              |
| External    |          |           |            |              |
+-------------+----------+-----------+------------+--------------+
| UA          | 500 (A)  | 0         | EXTERNAL   |              |
| Leukocyte   |          |           | LAB        |              |
| Esterase,   |          |           |            |              |
| External    |          |           |            |              |
 
+-------------+----------+-----------+------------+--------------+
 
 
 
+----------------+---------+--------------------+--------------+
| Performing     | Address | City/State/Zipcode | Phone Number |
| Organization   |         |                    |              |
+----------------+---------+--------------------+--------------+
|   EXTERNAL LAB |         |                    |              |
+----------------+---------+--------------------+--------------+
 documented in this encounter
 
 Visit Diagnoses
 Not on filedocumented in this encounter

## 2020-01-08 NOTE — XMS
Encounter Summary
  Created on: 2020
 
 Viviana Ricci
 External Reference #: [id number]
 : 47
 Sex: Female
 
 Demographics
 
 
+-----------------------+----------------------+
| Address               | [address]      |
|                       | [place]  [address] |
+-----------------------+----------------------+
| Home Phone            | [phone]      |
+-----------------------+----------------------+
| Preferred Language    | Unknown              |
+-----------------------+----------------------+
| Marital Status        | Single               |
+-----------------------+----------------------+
| Religion Affiliation | 1009                 |
+-----------------------+----------------------+
| Race                  | Unknown              |
+-----------------------+----------------------+
| Ethnic Group          | Unknown              |
+-----------------------+----------------------+
 
 
 Author
 
 
+--------------+--------------------------------------------+
| Author       | [facility]  |
|              | and [place]                                |
+--------------+--------------------------------------------+
| Organization | [facility]  |
|              | and [place]                                |
+--------------+--------------------------------------------+
| Address      | Unknown                                    |
+--------------+--------------------------------------------+
| Phone        | Unavailable                                |
+--------------+--------------------------------------------+
 
 
 
 Support
 
 
+----------------+--------------+---------------------+-----------------+
| Name           | Relationship | Address             | Phone           |
+----------------+--------------+---------------------+-----------------+
| Ada/Ed Radhames | ECON         | [address]              | [phone] |
|                |              | [place], [place]  |                 |
|                |              |  [address]              |                 |
+----------------+--------------+---------------------+-----------------+
 
 
 
 
 Care Team Providers
 
 
+-----------------------+------+-------------+
| Care Team Member Name | Role | Phone       |
+-----------------------+------+-------------+
 PCP  | Unavailable |
+-----------------------+------+-------------+
 
 
 
 Encounter Details
 
 
+--------+-----------+----------------------+-----------+-------------+
| Date   | Type      | Department           | Care Team | Description |
+--------+-----------+----------------------+-----------+-------------+
| 03/15/ | Hospital  |   [facility] |           |             |
|  - | Encounter |  MED CTR GENERIC IP  |           |             |
|        |           | CONV DEPT  [address]     |           |             |
| / |           [address]  [place], |           |             |
|    |           |  [place] [address]       |           |             |
|        |           | [phone]         |           |             |
+--------+-----------+----------------------+-----------+-------------+
 
 
 
 Social History
 
 
+----------------+-------+-----------+--------+------+
| Tobacco Use    | Types | Packs/Day | Years  | Date |
|                |       |           | Used   |      |
+----------------+-------+-----------+--------+------+
| Never Assessed |       |           |        |      |
+----------------+-------+-----------+--------+------+
 
 
 
+------------------+---------------+
| Sex Assigned at  | Date Recorded |
| Birth            |               |
+------------------+---------------+
| Not on file      |               |
+------------------+---------------+
 
 
 
+----------------+-------------+-------------+
| Job Start Date | Occupation  | Industry    |
+----------------+-------------+-------------+
| Not on file    | Not on file | Not on file |
+----------------+-------------+-------------+
 
 
 
+----------------+--------------+------------+
| Travel History | Travel Start | Travel End |
+----------------+--------------+------------+
 
 
 
 
+-------------------------------------+
| No recent travel history available. |
+-------------------------------------+
 documented as of this encounter
 
 Plan of Treatment
 
 
+--------+-----------+------------+----------------------+-------------------+
| Date   | Type      | Specialty  | Care Team            | Description       |
+--------+-----------+------------+----------------------+-------------------+
| / | Office    | Cardiology |   Tonia Wilson,    |                   |
|    | Visit     |            | ARNJESSICA  [address]   |                   |
|        |           |            | St  [place]  |                   |
|        |           |            | [address]  [phone]  |                   |
|        |           |            |  [phone] (Fax)  |                   |
+--------+-----------+------------+----------------------+-------------------+
| / | Hospital  | Radiology  |   Popeye Townsend, |                   |
|    | Encounter |            |  MD  [address] |                   |
|        |           |            |    [place],   |                   |
|        |           |            | [place] [address]             |                   |
|        |           |            | [phone]         |                   |
|        |           |            | [phone] (Fax)   |                   |
+--------+-----------+------------+----------------------+-------------------+
| / | Surgery   | Radiology  |   Popeye Townsend, | CV EP PPM SYSTEM  |
|    |           |            |  MD  [address] | IMPLANT           |
|        |           |            |  [place]  [place],   |                   |
|        |           |            | [place] [address]             |                   |
|        |           |            | [phone]         |                   |
|        |           |            | [phone] (Fax)   |                   |
+--------+-----------+------------+----------------------+-------------------+
| 02/10/ | Clinical  | Cardiology |                      |                   |
|    | Support   |            |                      |                   |
+--------+-----------+------------+----------------------+-------------------+
| / | Office    | Cardiology |   CliffordalfredoTonia,    |                   |
|    | Visit     |            | BELKIS  [address]   |                   |
|        |           |            | [place]  |                   |
|        |           |            | [address]  [phone]  |                   |
|        |           |            |  [phone] (Fax)  |                   |
+--------+-----------+------------+----------------------+-------------------+
| / | Off-Site  | Nephrology |   Marzena Moser  |                   |
|    | Visit     |            | DO ETIENNE  [address] [place] [address]      |                   |
|        |           |            | [address]      |                   |
|        |           |            | [place]      |                   |
|        |           |            | [address]  [phone]  |                   |
|        |           |            |  [phone] (Fax)  |                   |
+--------+-----------+------------+----------------------+-------------------+
 documented as of this encounter
 
 Visit Diagnoses
 Not on filedocumented in this encounter

## 2020-01-08 NOTE — XMS
Encounter Summary
  Created on: 2020
 
 Viviana Ricci
 External Reference #: [id number]
 : 47
 Sex: Female
 
 Demographics
 
 
+-----------------------+----------------------+
| Address               | [address]      |
|                       | [place]  [address] |
+-----------------------+----------------------+
| Home Phone            | [phone]      |
+-----------------------+----------------------+
| Preferred Language    | Unknown              |
+-----------------------+----------------------+
| Marital Status        | Single               |
+-----------------------+----------------------+
| Yarsanism Affiliation | [id number]                 |
+-----------------------+----------------------+
| Race                  | Unknown              |
+-----------------------+----------------------+
| Ethnic Group          | Unknown              |
+-----------------------+----------------------+
 
 
 Author
 
 
+--------------+--------------------------------------------+
| Author       | [facility]  |
|              | and [place]                                |
+--------------+--------------------------------------------+
| Organization | [facility]  |
|              | and [place]                                |
+--------------+--------------------------------------------+
| Address      | Unknown                                    |
+--------------+--------------------------------------------+
| Phone        | Unavailable                                |
+--------------+--------------------------------------------+
 
 
 
 Support
 
 
+----------------+--------------+---------------------+-----------------+
| Name           | Relationship | Address             | Phone           |
+----------------+--------------+---------------------+-----------------+
| Ada/Ed Radhames | ECON         | [address]              | [phone] |
|                |              | [place]  |                 |
|                |              |  [address]              |                 |
+----------------+--------------+---------------------+-----------------+
 
 
 
 
 Care Team Providers
 
 
+-----------------------+------+-------------+
| Care Team Member Name | Role | Phone       |
+-----------------------+------+-------------+
 PCP  | Unavailable |
+-----------------------+------+-------------+
 
 
 
 Encounter Details
 
 
+--------+-------------+---------------------+----------------------+-------------+
| Date   | Type        | Department          | Care Team            | Description |
+--------+-------------+---------------------+----------------------+-------------+
| / | Orders Only |   [facility]         |   Marzena Moser  |             |
|    |             | NEPHROLOGY  [address]   | M, DO  [address]      |             |
|        |             | [address]   | [address]      |             |
|        |             | [place]     | [place]      |             |
|        |             | [address]          | [address]  [phone]  |             |
|        |             | [phone]        |  [phone] (Fax)  |             |
+--------+-------------+---------------------+----------------------+-------------+
 
 
 
 Social History
 
 
+--------------+-------+-----------+--------+------+
| Tobacco Use  | Types | Packs/Day | Years  | Date |
|              |       |           | Used   |      |
+--------------+-------+-----------+--------+------+
| Never Smoker |       |           |        |      |
+--------------+-------+-----------+--------+------+
 
 
 
+---------------------+---+---+---+
| Smokeless Tobacco:  |   |   |   |
| Never Used          |   |   |   |
+---------------------+---+---+---+
 
 
 
+---------------------------------------------------------------+
| Comments: some second hand smoke exposure, but fairly minimal |
+---------------------------------------------------------------+
 
 
 
+-------------+-------------+---------+----------+
| Alcohol Use | Drinks/Week | oz/Week | Comments |
+-------------+-------------+---------+----------+
| No          |             |         |          |
+-------------+-------------+---------+----------+
 
 
 
 
+------------------+---------------+
| Sex Assigned at  | Date Recorded |
| Birth            |               |
+------------------+---------------+
| Not on file      |               |
+------------------+---------------+
 
 
 
+----------------+-------------+-------------+
| Job Start Date | Occupation  | Industry    |
+----------------+-------------+-------------+
| Not on file    | Not on file | Not on file |
+----------------+-------------+-------------+
 
 
 
+----------------+--------------+------------+
| Travel History | Travel Start | Travel End |
+----------------+--------------+------------+
 
 
 
+-------------------------------------+
| No recent travel history available. |
+-------------------------------------+
 documented as of this encounter
 
 Plan of Treatment
 
 
+--------+-----------+------------+----------------------+-------------------+
| Date   | Type      | Specialty  | Care Team            | Description       |
+--------+-----------+------------+----------------------+-------------------+
| / | Office    | Cardiology |   Tonia Wilson,    |                   |
|    | Visit     |            | [address]   |                   |
|        |           |            | [place]  |                   |
|        |           |            | [id number] [phone]  |                   |
|        |           |            |  [phone] (Fax)  |                   |
+--------+-----------+------------+----------------------+-------------------+
| / | Hospital  | Radiology  |   Popeye Townsend, |                   |
|    | Encounter |            |  MD [address] |                   |
|        |           |            |  St.  [place],   |                   |
|        |           |            | [place] [address]             |                   |
|        |           |            | [phone]         |                   |
|        |           |            | [phone] (Fax)   |                   |
+--------+-----------+------------+----------------------+-------------------+
| / | Surgery   | Radiology  |   Popeye Townsend, | CV EP PPM SYSTEM  |
|    |           |            |  MD  [address] | IMPLANT           |
|        |           |            |  St.  [place],   |                   |
|        |           |            | [place] [address]             |                   |
|        |           |            | [phone]         |                   |
|        |           |            | [phone] (Fax)   |                   |
+--------+-----------+------------+----------------------+-------------------+
| 02/10/ | Clinical  | Cardiology |                      |                   |
|    | Support   |            |                      |                   |
+--------+-----------+------------+----------------------+-------------------+
| / | Office    | Cardiology |   Tonia Wilson,    |                   |
|    | Visit     |            | [address]   |                   |
 
|        |           |            | St  [place], [place]  |                   |
|        |           |            | [address] [phone]  |                   |
|        |           |            |  [phone] (Fax)  |                   |
+--------+-----------+------------+----------------------+-------------------+
| / | Off-Site  | Nephrology |   Marzena Moser  |                   |
|    | Visit     |            | DO [address]      |                   |
|        |           |            | [address]      |                   |
|        |           |            | [place]      |                   |
|        |           |            | [id number] [phone]  |                   |
|        |           |            |  [phone] (Fax)  |                   |
+--------+-----------+------------+----------------------+-------------------+
 documented as of this encounter
 
 Visit Diagnoses
 Not on filedocumented in this encounter

## 2020-01-08 NOTE — XMS
Encounter Summary
  Created on: 2020
 
 Viviana Ricci
 External Reference #: [id number]
 : 47
 Sex: Female
 
 Demographics
 
 
+-----------------------+----------------------+
| Address               | [address]      |
|                       | [place]  [address] |
+-----------------------+----------------------+
| Home Phone            | [phone]      |
+-----------------------+----------------------+
| Preferred Language    | Unknown              |
+-----------------------+----------------------+
| Marital Status        | Single               |
+-----------------------+----------------------+
| Scientology Affiliation | [id number]                 |
+-----------------------+----------------------+
| Race                  | Unknown              |
+-----------------------+----------------------+
| Ethnic Group          | Unknown              |
+-----------------------+----------------------+
 
 
 Author
 
 
+--------------+--------------------------------------------+
| Author       | [facility]  |
|              | and [place]                                |
+--------------+--------------------------------------------+
| Organization | [facility]  |
|              | and [place]                                |
+--------------+--------------------------------------------+
| Address      | Unknown                                    |
+--------------+--------------------------------------------+
| Phone        | Unavailable                                |
+--------------+--------------------------------------------+
 
 
 
 Support
 
 
+----------------+--------------+---------------------+-----------------+
| Name           | Relationship | Address             | Phone           |
+----------------+--------------+---------------------+-----------------+
| Ada/Ed Rahdames | ECON         | [address]              | [phone] |
|                |              | [place]  |                 |
|                |              |  [address]              |                 |
+----------------+--------------+---------------------+-----------------+
 
 
 
 
 Care Team Providers
 
 
+-----------------------+------+-------------+
| Care Team Member Name | Role | Phone       |
+-----------------------+------+-------------+
 PCP  | Unavailable |
+-----------------------+------+-------------+
 
 
 
 Reason for Visit
 
 
+-------------------+----------+
| Reason            | Comments |
+-------------------+----------+
| Medication Refill |          |
+-------------------+----------+
 
 
 
 Encounter Details
 
 
+--------+--------+---------------------+----------------------+-------------------+
| Date   | Type   | Department          | Care Team            | Description       |
+--------+--------+---------------------+----------------------+-------------------+
| 10/14/ | Refill |   [facility]         |   Marzena Moser  | Medication Refill |
|    |        | NEPHROLOGY  [address]   | M, DO  [address]      |                   |
|        |        | [address]   | [address]      |                   |
|        |        | [place], [place]     | [place], [place]      |                   |
|        |        | [address]          | [address]  [phone]  |                   |
|        |        | [phone]        |  [phone] (Fax)  |                   |
+--------+--------+---------------------+----------------------+-------------------+
 
 
 
 Social History
 
 
+--------------+-------+-----------+--------+------+
| Tobacco Use  | Types | Packs/Day | Years  | Date |
|              |       |           | Used   |      |
+--------------+-------+-----------+--------+------+
| Never Smoker |       |           |        |      |
+--------------+-------+-----------+--------+------+
 
 
 
+---------------------+---+---+---+
| Smokeless Tobacco:  |   |   |   |
| Never Used          |   |   |   |
+---------------------+---+---+---+
 
 
 
+---------------------------------------------------------------+
| Comments: some second hand smoke exposure, but fairly minimal |
 
+---------------------------------------------------------------+
 
 
 
+-------------+-------------+---------+----------+
| Alcohol Use | Drinks/Week | oz/Week | Comments |
+-------------+-------------+---------+----------+
| No          |             |         |          |
+-------------+-------------+---------+----------+
 
 
 
+------------------+---------------+
| Sex Assigned at  | Date Recorded |
| Birth            |               |
+------------------+---------------+
| Not on file      |               |
+------------------+---------------+
 
 
 
+----------------+-------------+-------------+
| Job Start Date | Occupation  | Industry    |
+----------------+-------------+-------------+
| Not on file    | Not on file | Not on file |
+----------------+-------------+-------------+
 
 
 
+----------------+--------------+------------+
| Travel History | Travel Start | Travel End |
+----------------+--------------+------------+
 
 
 
+-------------------------------------+
| No recent travel history available. |
+-------------------------------------+
 documented as of this encounter
 
 Plan of Treatment
 
 
+--------+-----------+------------+----------------------+-------------------+
| Date   | Type      | Specialty  | Care Team            | Description       |
+--------+-----------+------------+----------------------+-------------------+
| / | Office    | Cardiology |   HellalfredoTonia,    |                   |
|    | Visit     |            | ARNP  [address]   |                   |
|        |           |            | St  [place], [place]  |                   |
|        |           |            | [address]  [phone]  |                   |
|        |           |            |  [phone] (Fax)  |                   |
+--------+-----------+------------+----------------------+-------------------+
| / | Hospital  | Radiology  |   Popeye Townsend, |                   |
|    | Encounter |            |  MD  [address] |                   |
|        |           |            |  St.  [place],   |                   |
|        |           |            | [place] [address]             |                   |
|        |           |            | [phone]         |                   |
|        |           |            | [phone] (Fax)   |                   |
+--------+-----------+------------+----------------------+-------------------+
| / | Surgery   | Radiology  |   Popeye Townsend, | CV EP PPM SYSTEM  |
 
|    |           |            |  MD  [address] | IMPLANT           |
|        |           |            |  St.  [place],   |                   |
|        |           |            | [place] [address]             |                   |
|        |           |            | [phone]         |                   |
|        |           |            | [phone] (Fax)   |                   |
+--------+-----------+------------+----------------------+-------------------+
| 02/10/ | Clinical  | Cardiology |                      |                   |
|    | Support   |            |                      |                   |
+--------+-----------+------------+----------------------+-------------------+
| / | Office    | Cardiology |   Tonia Wilson,    |                   |
|    | Visit     |            | ARNP  [address]   |                   |
|        |           |            | [place]  |                   |
|        |           |            | [address]  [phone]  |                   |
|        |           |            |  [phone] (Fax)  |                   |
+--------+-----------+------------+----------------------+-------------------+
| / | Off-Site  | Nephrology |   Marzena Moser  |                   |
|    | Visit     |            | DO ETIENNE  [address] [place] [address]      |                   |
|        |           |            | [address]      |                   |
|        |           |            | [place]      |                   |
|        |           |            | [address]  [phone]  |                   |
|        |           |            |  [phone] (Fax)  |                   |
+--------+-----------+------------+----------------------+-------------------+
 documented as of this encounter
 
 Visit Diagnoses
 Not on filedocumented in this encounter

## 2020-01-08 NOTE — XMS
Encounter Summary
  Created on: 2020
 
 Viviana Ricci
 External Reference #: [id number]
 : 47
 Sex: Female
 
 Demographics
 
 
+-----------------------+----------------------+
| Address               | [address]      |
|                       | [place]  [address] |
+-----------------------+----------------------+
| Home Phone            | [phone]      |
+-----------------------+----------------------+
| Preferred Language    | Unknown              |
+-----------------------+----------------------+
| Marital Status        | Single               |
+-----------------------+----------------------+
| Evangelical Affiliation | [id number]                 |
+-----------------------+----------------------+
| Race                  | Unknown              |
+-----------------------+----------------------+
| Ethnic Group          | Unknown              |
+-----------------------+----------------------+
 
 
 Author
 
 
+--------------+--------------------------------------------+
| Author       | [facility]  |
|              | and [place]                                |
+--------------+--------------------------------------------+
| Organization | [facility] [place]  |
|              | and [place]                                |
+--------------+--------------------------------------------+
| Address      | Unknown                                    |
+--------------+--------------------------------------------+
| Phone        | Unavailable                                |
+--------------+--------------------------------------------+
 
 
 
 Support
 
 
+----------------+--------------+---------------------+-----------------+
| Name           | Relationship | Address             | Phone           |
+----------------+--------------+---------------------+-----------------+
| Ada/Ed Radhames | ECON         | [place]              | [phone] |
|                |              | [place]  |                 |
|                |              |  [address]              |                 |
+----------------+--------------+---------------------+-----------------+
 
 
 
 
 Care Team Providers
 
 
+-----------------------+------+-------------+
| Care Team Member Name | Role | Phone       |
+-----------------------+------+-------------+
 PCP  | Unavailable |
+-----------------------+------+-------------+
 
 
 
 Encounter Details
 
 
+--------+----------+---------------------+----------------------+-------------+
| Date   | Type     | Department          | Care Team            | Description |
+--------+----------+---------------------+----------------------+-------------+
| / | Abstract |   [facility] [place]         |   Marzena Moser  |             |
|    |          | NEPHROLOGY  [address]   | M, DO  [address]      |             |
|        |          | [address]   | [address]      |             |
|        |          | [place], [place]     | [place]      |             |
|        |          | [address]          | [address]  [phone]  |             |
|        |          | [phone]        |  [phone] (Fax)  |             |
+--------+----------+---------------------+----------------------+-------------+
 
 
 
 Social History
 
 
+--------------+-------+-----------+--------+------+
| Tobacco Use  | Types | Packs/Day | Years  | Date |
|              |       |           | Used   |      |
+--------------+-------+-----------+--------+------+
| Never Smoker |       |           |        |      |
+--------------+-------+-----------+--------+------+
 
 
 
+---------------------+---+---+---+
| Smokeless Tobacco:  |   |   |   |
| Never Used          |   |   |   |
+---------------------+---+---+---+
 
 
 
+---------------------------------------------------------------+
| Comments: some second hand smoke exposure, but fairly minimal |
+---------------------------------------------------------------+
 
 
 
+-------------+-------------+---------+----------+
| Alcohol Use | Drinks/Week | oz/Week | Comments |
+-------------+-------------+---------+----------+
| No          |             |         |          |
+-------------+-------------+---------+----------+
 
 
 
 
+------------------+---------------+
| Sex Assigned at  | Date Recorded |
| Birth            |               |
+------------------+---------------+
| Not on file      |               |
+------------------+---------------+
 
 
 
+----------------+-------------+-------------+
| Job Start Date | Occupation  | Industry    |
+----------------+-------------+-------------+
| Not on file    | Not on file | Not on file |
+----------------+-------------+-------------+
 
 
 
+----------------+--------------+------------+
| Travel History | Travel Start | Travel End |
+----------------+--------------+------------+
 
 
 
+-------------------------------------+
| No recent travel history available. |
+-------------------------------------+
 documented as of this encounter
 
 Plan of Treatment
 
 
+--------+-----------+------------+----------------------+-------------------+
| Date   | Type      | Specialty  | Care Team            | Description       |
+--------+-----------+------------+----------------------+-------------------+
| / | Office    | Cardiology |   Tonia Wilson,    |                   |
|    | Visit     |            | ARNJESSICA  [address]   |                   |
|        |           |            | [place]  |                   |
|        |           |            | [address]  [phone]  |                   |
|        |           |            |  [phone] (Fax)  |                   |
+--------+-----------+------------+----------------------+-------------------+
| / | Hospital  | Radiology  |   Popeye Townsend, |                   |
|    | Encounter |            |  MD  [address] |                   |
|        |           |            |  St.  [place],   |                   |
|        |           |            | [place] [address]             |                   |
|        |           |            | [phone]         |                   |
|        |           |            | [phone] (Fax)   |                   |
+--------+-----------+------------+----------------------+-------------------+
| / | Surgery   | Radiology  |   Popeye Townsend, | CV EP PPM SYSTEM  |
|    |           |            |  MD  [address] | IMPLANT           |
|        |           |            |  St.  [place],   |                   |
|        |           |            | [place] [address]             |                   |
|        |           |            | [phone]         |                   |
|        |           |            | [phone] (Fax)   |                   |
+--------+-----------+------------+----------------------+-------------------+
| 02/10/ | Clinical  | Cardiology |                      |                   |
|    | Support   |            |                      |                   |
+--------+-----------+------------+----------------------+-------------------+
| / | Office    | Cardiology |   Tonia Wilson,    |                   |
|    | Visit     |            | ARNP  [address]   |                   |
 
|        |           |            | St  [place], [place]  |                   |
|        |           |            | [address]  [phone]  |                   |
|        |           |            |  [phone] (Fax)  |                   |
+--------+-----------+------------+----------------------+-------------------+
| / | Off-Site  | Nephrology |   Marzena Moser  |                   |
|    | Visit     |            | DO ETIENNE  [address] [place] [address]      |                   |
|        |           |            | [address]      |                   |
|        |           |            | [place]      |                   |
|        |           |            | [address]  [phone]  |                   |
|        |           |            |  [phone] (Fax)  |                   |
+--------+-----------+------------+----------------------+-------------------+
 documented as of this encounter
 
 Procedures
 
 
+--------------------+--------+------------+----------------------+----------------------+
| Procedure Name     | Priori | Date/Time  | Associated Diagnosis | Comments             |
|                    | ty     |            |                      |                      |
+--------------------+--------+------------+----------------------+----------------------+
| EXTERNAL LAB:      | Routin | 2018 |                      |   Results for this   |
| TACROLIMUS LEVEL,  | e      |            |                      | procedure are in the |
| [facility]               |        |            |                      |  results section.    |
+--------------------+--------+------------+----------------------+----------------------+
 documented in this encounter
 
 Results
 External Lab: Tacrolimus Level, [facility] (2018)
 
+-------------+-------+-----------+------------+--------------+
| Component   | Value | Ref Range | Performed  | Pathologist  |
|             |       |           | At         | Signature    |
+-------------+-------+-----------+------------+--------------+
| Tacrolimus, | 9.2   |           |            |              |
|  [facility]IA,      |       |           |            |              |
| External    |       |           |            |              |
+-------------+-------+-----------+------------+--------------+
 
 
 
+----------+
| Specimen |
+----------+
|          |
+----------+
 documented in this encounter
 
 Visit Diagnoses
 Not on filedocumented in this encounter

## 2020-01-08 NOTE — XMS
Encounter Summary
  Created on: 2020
 
 Viviana Ricci
 External Reference #: [id number]
 : 47
 Sex: Female
 
 Demographics
 
 
+-----------------------+----------------------+
| Address               | [address]      |
|                       | [place]  [address] |
+-----------------------+----------------------+
| Home Phone            | [phone]      |
+-----------------------+----------------------+
| Preferred Language    | Unknown              |
+-----------------------+----------------------+
| Marital Status        | Single               |
+-----------------------+----------------------+
| Muslim Affiliation | 1009                 |
+-----------------------+----------------------+
| Race                  | Unknown              |
+-----------------------+----------------------+
| Ethnic Group          | Unknown              |
+-----------------------+----------------------+
 
 
 Author
 
 
+--------------+--------------------------------------------+
| Author       | [facility]  |
|              | and [place]                                |
+--------------+--------------------------------------------+
| Organization | [facility]  |
|              | and [place]                                |
+--------------+--------------------------------------------+
| Address      | Unknown                                    |
+--------------+--------------------------------------------+
| Phone        | Unavailable                                |
+--------------+--------------------------------------------+
 
 
 
 Support
 
 
+----------------+--------------+---------------------+-----------------+
| Name           | Relationship | Address             | Phone           |
+----------------+--------------+---------------------+-----------------+
| Ada/Ed Radhames | ECON         | [address]              | [phone] |
|                |              | [place]  |                 |
|                |              |  [address]              |                 |
+----------------+--------------+---------------------+-----------------+
 
 
 
 
 Care Team Providers
 
 
+-----------------------+------+-------------+
| Care Team Member Name | Role | Phone       |
+-----------------------+------+-------------+
 PCP  | Unavailable |
+-----------------------+------+-------------+
 
 
 
 Encounter Details
 
 
+--------+-----------+----------------------+-----------+-------------+
| Date   | Type      | Department           | Care Team | Description |
+--------+-----------+----------------------+-----------+-------------+
| / | Hospital  |   [facility] |           |             |
|  - | Encounter |  MED CTR MED ONC     |           |             |
|        |           | [address]  |           |             |
| / |           | [place] [address] |           |             |
|    |           |   [phone]       |           |             |
+--------+-----------+----------------------+-----------+-------------+
 
 
 
 Social History
 
 
+----------------+-------+-----------+--------+------+
| Tobacco Use    | Types | Packs/Day | Years  | Date |
|                |       |           | Used   |      |
+----------------+-------+-----------+--------+------+
| Never Assessed |       |           |        |      |
+----------------+-------+-----------+--------+------+
 
 
 
+------------------+---------------+
| Sex Assigned at  | Date Recorded |
| Birth            |               |
+------------------+---------------+
| Not on file      |               |
+------------------+---------------+
 
 
 
+----------------+-------------+-------------+
| Job Start Date | Occupation  | Industry    |
+----------------+-------------+-------------+
| Not on file    | Not on file | Not on file |
+----------------+-------------+-------------+
 
 
 
+----------------+--------------+------------+
| Travel History | Travel Start | Travel End |
+----------------+--------------+------------+
 
 
 
 
+-------------------------------------+
| No recent travel history available. |
+-------------------------------------+
 documented as of this encounter
 
 Plan of Treatment
 
 
+--------+-----------+------------+----------------------+-------------------+
| Date   | Type      | Specialty  | Care Team            | Description       |
+--------+-----------+------------+----------------------+-------------------+
| / | Office    | Cardiology |   Tonia Wilson,    |                   |
|    | Visit     |            | ARNP  [address]   |                   |
|        |           |            | St  [place] [facility], [place]  |                   |
|        |           |            | [address]  [phone]  |                   |
|        |           |            |  [phone] (Fax)  |                   |
+--------+-----------+------------+----------------------+-------------------+
| / | Hospital  | Radiology  |   Popeye Townsend, |                   |
|    | Encounter |            |  MD  [address] |                   |
|        |           |            |  St.  [place],   |                   |
|        |           |            | [place] [address]             |                   |
|        |           |            | [phone]         |                   |
|        |           |            | [phone] (Fax)   |                   |
+--------+-----------+------------+----------------------+-------------------+
| / | Surgery   | Radiology  |   Popeye Townsend, | CV EP PPM SYSTEM  |
|    |           |            |  MD  [address] | IMPLANT           |
|        |           |            |  St.  [place],   |                   |
|        |           |            | [place] [address]             |                   |
|        |           |            | [phone]         |                   |
|        |           |            | [phone] (Fax)   |                   |
+--------+-----------+------------+----------------------+-------------------+
| 02/10/ | Clinical  | Cardiology |                      |                   |
|    | Support   |            |                      |                   |
+--------+-----------+------------+----------------------+-------------------+
| / | Office    | Cardiology |   Hellberg, Tonia,    |                   |
|    | Visit     |            | [facility]  [address]   |                   |
|        |           |            | [place]  |                   |
|        |           |            | [address]  [phone]  |                   |
|        |           |            |  [phone] (Fax)  |                   |
+--------+-----------+------------+----------------------+-------------------+
| / | Off-Site  | Nephrology |   Marzena Moser  |                   |
|    | Visit     |            | DO ETIENNE  [address] [place] [address]      |                   |
|        |           |            | [address]      |                   |
|        |           |            | [place]      |                   |
|        |           |            | [address] [phone]  |                   |
|        |           |            |  [phone] (Fax)  |                   |
+--------+-----------+------------+----------------------+-------------------+
 documented as of this encounter
 
 Visit Diagnoses
 Not on filedocumented in this encounter

## 2020-01-08 NOTE — XMS
Encounter Summary
  Created on: 2020
 
 Viviana Ricci
 External Reference #: [id number]
 : 47
 Sex: Female
 
 Demographics
 
 
+-----------------------+----------------------+
| Address               | [address]      |
|                       | [place]  [address] |
+-----------------------+----------------------+
| Home Phone            | [phone]      |
+-----------------------+----------------------+
| Preferred Language    | Unknown              |
+-----------------------+----------------------+
| Marital Status        | Single               |
+-----------------------+----------------------+
| [employer]ats[employer] Affiliation | [id number]                 |
+-----------------------+----------------------+
| Race                  | Unknown              |
+-----------------------+----------------------+
| Ethnic Group          | Unknown              |
+-----------------------+----------------------+
 
 
 Author
 
 
+--------------+--------------------------------------------+
| Author       | [facility]  |
|              | and [facility]                                |
+--------------+--------------------------------------------+
| Organization | [facility]  |
|              | and [facility]                                |
+--------------+--------------------------------------------+
| Address      | Unknown                                    |
+--------------+--------------------------------------------+
| Phone        | Unavailable                                |
+--------------+--------------------------------------------+
 
 
 
 Support
 
 
+----------------+--------------+---------------------+-----------------+
| Name           | Relationship | Address             | Phone           |
+----------------+--------------+---------------------+-----------------+
| Ada/Ed Radhames | ECON         | [address]              | [phone] |
|                |              | [place]  |                 |
|                |              |  [id number]              |                 |
+----------------+--------------+---------------------+-----------------+
 
 
 
 
 Care Team Providers
 
 
+-----------------------+------+-------------+
| Care Team Member Name | Role | Phone       |
+-----------------------+------+-------------+
 PCP  | Unavailable |
+-----------------------+------+-------------+
 
 
 
 Encounter Details
 
 
+--------+-----------+---------------------+----------------------+----------------------+
| Date   | Type      | Department          | Care Team            | Description          |
+--------+-----------+---------------------+----------------------+----------------------+
| 07/15/ | Off-Site  |   [facility]         |   Marzena Moser  | Complications of     |
|    | Visit     | NEPHROLOGY  [address]   | M, DO  [address]      | transplanted kidney  |
|        |           | [address]   | [address]      | (Primary Dx);        |
|        |           | [place], [place]     | [place], [place]      | Unspecified          |
|        |           | [address]          | [address]  [phone]  | hypertensive kidney  |
|        |           | [phone]        |  [phone] (Fax)  | disease with chronic |
|        |           |                     |                      |  kidney disease      |
|        |           |                     |                      | stage I through      |
|        |           |                     |                      | stage IV, or         |
|        |           |                     |                      | unspecified; FSGS    |
|        |           |                     |                      | (focal segmental     |
|        |           |                     |                      | glomerulosclerosis); |
|        |           |                     |                      |  Diabetes mellitus,  |
|        |           |                     |                      | type 2 (HCC)         |
+--------+-----------+---------------------+----------------------+----------------------+
 
 
 
 Social History
 
 
+--------------+-------+-----------+--------+------+
| Tobacco Use  | Types | Packs/Day | Years  | Date |
|              |       |           | Used   |      |
+--------------+-------+-----------+--------+------+
| Never Smoker |       |           |        |      |
+--------------+-------+-----------+--------+------+
 
 
 
+---------------------+---+---+---+
| Smokeless Tobacco:  |   |   |   |
| Never Used          |   |   |   |
+---------------------+---+---+---+
 
 
 
+---------------------------------------------------------------+
| Comments: some second hand smoke exposure, but fairly minimal |
+---------------------------------------------------------------+
 
 
 
 
+-------------+-------------+---------+----------+
| Alcohol Use | Drinks/Week | oz/Week | Comments |
+-------------+-------------+---------+----------+
| No          |             |         |          |
+-------------+-------------+---------+----------+
 
 
 
+------------------+---------------+
| Sex Assigned at  | Date Recorded |
| Birth            |               |
+------------------+---------------+
| Not on file      |               |
+------------------+---------------+
 
 
 
+----------------+-------------+-------------+
| Job Start Date | Occupation  | Industry    |
+----------------+-------------+-------------+
| Not on file    | Not on file | Not on file |
+----------------+-------------+-------------+
 
 
 
+----------------+--------------+------------+
| Travel History | Travel Start | Travel End |
+----------------+--------------+------------+
 
 
 
+-------------------------------------+
| No recent travel history available. |
+-------------------------------------+
 documented as of this encounter
 
 Last Filed Vital Signs
 
 
+-------------------+----------------------+----------------------+----------+
| Vital Sign        | Reading              | Time Taken           | Comments |
+-------------------+----------------------+----------------------+----------+
| Blood Pressure    | 136/78               | 07/15/2013  2:21 PM  |          |
|                   |                      | PDT                  |          |
+-------------------+----------------------+----------------------+----------+
| Pulse             | -                    | -                    |          |
+-------------------+----------------------+----------------------+----------+
| Temperature       | 36.7   C (98.1   F)  | 07/15/2013  2:21 PM  |          |
|                   |                      | PDT                  |          |
+-------------------+----------------------+----------------------+----------+
| Respiratory Rate  | -                    | -                    |          |
+-------------------+----------------------+----------------------+----------+
| Oxygen Saturation | -                    | -                    |          |
+-------------------+----------------------+----------------------+----------+
| Inhaled Oxygen    | -                    | -                    |          |
| Concentration     |                      |                      |          |
+-------------------+----------------------+----------------------+----------+
| Weight            | 136 kg (299 lb 13.2  | 07/15/2013  2:21 PM  |          |
|                   | oz)                  | PDT                  |          |
 
+-------------------+----------------------+----------------------+----------+
| Height            | -                    | -                    |          |
+-------------------+----------------------+----------------------+----------+
| Body Mass Index   | 49.89                | 2013 10:22 AM  |          |
|                   |                      | PDT                  |          |
+-------------------+----------------------+----------------------+----------+
 documented in this encounter
 
 Progress Marzena Tamayo DO - 07/15/2013  5:59 PM PDTFormatting of this note might be different
 from the original.
 Subjective:  NEPHROLOGY 
  
 Patient ID: Viviana Ricci is a 67 y.o. female.
 
 HPI Comments: 
 Followup for this 67 year old white female s/p renal allograft, 05, with previous allo
graft dysfunction secondary to FSGS, also with Type II DM, hypertension, hypothyroidism, hyp
erlipidemia, 
 SHPTH,  previous low back pain, obesity/JACK, chronic pulmonary  hypertension, possibly rela
jeanie to obesity?, and  CAD, s/p CABG x3 vessels,.   She had a pulmonary evaluation after 
her asthma flair, and her PFT's were found to be normal.  She denies dyspnea, cough, increas
ed edema, or fever. She had cystitis last week treated with cephalexin.  She is feeling bett
er.
 
 MEDS:
 
 Prograf 1.5 mg, BID
 Mycophenolate 250 mg, TID.
 Prednisone 5 mg, daily.
 albuterol (PROAIR HFA) 90 mcg/puff inhaler, Inhale 2 puffs into the lungs every 6 hours as 
needed for Wheezing., Disp: 1 Inhaler, Rfl: 2
 allopurinol (ZYLOPRIM) 100 mg tablet, Take 100 mg by mouth Daily., Disp: , Rfl: 
 aspirin 81 MG EC tablet, Take 81 mg by mouth Daily., Disp: , Rfl: 
 Cholecalciferol (VITAMIN D3) 5000 UNITS CAPS, Take 5,000 Units by mouth Once a week., Disp:
 , 
 cinacalcet (SENSIPAR) 30 mg tablet, Take 1 tablet by mouth Daily., Disp: 30 tablet, Rfl: 12
 fludrocortisone (FLORINEF) 0.1 mg tablet, Take 1 tablet by mouth Every other day., Disp: 30
 tablet, Rfl: 11
 flunisolide (NASAREL) 29 MCG/ACT (0.025%) nasal spray, 2 sprays by Nasal route Daily. Dose 
is for each nostril., Disp: 25 mL, Rfl: 12
 fluticasone (FLOVENT HFA) 220 mcg/puff inhaler, Inhale 1 puff into the lungs 2 times daily.
 Rinse mouth after use., Disp: 1 Inhaler, Rfl: 11
 furosemide (LASIX) 40 mg tablet, Take two tablets by mouth daily., Disp: 60 tablet, Rfl: 5
 glucose blood VI test strips (ONE TOUCH ULTRA TEST) strip, Check blood sugar before each me
al and as directed, Disp: 100 each, Rfl: 12
 insulin glargine (LANTUS) 100 units/mL injection, Inject 20 Units under the skin every morn
ing.  , Disp: , Rfl: 
 insulin lispro (HUMALOG) 100 units/mL injection, Inject subcutaneously before meals accordi
ng to sliding scale, Disp: 10 pen, Rfl: 5
 Insulin Syringe-Needle U-100 (BD INSULIN SYRINGE ULTRAFINE) 31G X 5/16" 0.5 ML MISC, Use be
fore meals and as directed., Disp: 100 each, Rfl: 11
 levothyroxine (LEVOTHROID) 50 mcg tablet, Take 1 tablet by mouth Daily., Disp: 30 tablet, R
fl: 11
 lisinopril (PRINIVIL,ZESTRIL) 30 MG tablet, Take 1 tablet by mouth Daily., Disp: 90 tablet,
 Rfl: 3
 loperamide (ANTI-DIARRHEAL) 2 mg capsule, Take 2 mg by mouth Daily as needed., Disp: , Rfl:
 
 magnesium oxide (MAG-OX) 400 mg tablet, Take 1 tablet by mouth 2 times daily., Disp: 62 tab
let, Rfl: 12
 
 metoprolol tartrate (LOPRESSOR) 25 mg tablet, Take 1 tablet by mouth 2 times daily., Disp: 
60 tablet, Rfl: 11
 omeprazole (PRILOSEC) 20 mg capsule, Take one capsule by mouth once daily on an empty stoma
ch, Disp: , Rfl: 
 Prenatal Multivit-Min-Fe-FA (PRENATAL VITAMINS) 0.8 MG TABS, Take 0.8 mg by mouth Daily., D
isp: 30 each, Rfl: 11
 Respiratory Therapy Supplies MISC, Decrease CPAP to 12-18 cmH2O Diagnosis Code(s)327.23.
 rosuvastatin (CRESTOR) 40 MG tablet, Take 20 mg by mouth nightly., Disp: , Rfl: 
 valsartan (DIOVAN) 160 mg tablet, Take 1 tablet by mouth Daily., Disp: 30 tablet, Rfl: 11
 
  
  Review of Systems
 
 No Known Allergies
 
 Objective:   Blood pressure 136/78, temperature 36.7 C (98.1 F), weight 136 kg (299 lb 
13.2 oz).
 
  
 Physical Exam
 
 HEENT: No thrush.
 Heart:  Regular rate and rhythm with no S3, S4, murmur or rub.
 Lungs:   CTA bilaterally, no rales or wheezes.
 Abdomen:  Soft, flat,  Renal allograft in RLQ is nontender, normoactive bowel sounds.
 Extremities: No clubbing, edema, or foot ulcers.
 
 LAB:   BUN 69, Cr 1.7, K+ 4.8, HCO3 19, Glu 103, Mg++ 2.1, PO4 4.1, WBC 4.6, H/H = 11.9/35.
7, ,000, Tacrolimus = 6.3 ng/ml, PCR for BK virus, blood = not detected, PCR for BK v
irus, urine = 2.6 to 8.6 log copies/ml.  
 
 Assessment: 
 
 1.  Renal Allograft--Scr is at baseline.
 2.  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
 3.  Type 2 DM--fair control per the patient.
 4.  Hyperlipidemia-- stable. 
 5.  Hypertension--stable.
 6.  SHPTH--about same.
 7.  Obesity/JACK/Pulmonary hypertension-- stable. 
 8.  CAD, s/p CABG, --stable on ASA, metoprolol, atorvastatin.
 9.  Type IV RTA--stable.
 10. Chronic Low Back pain--in remission. 
 
 Plan: 
 
 1.  Her edema is much decreased this visit , therefore, will decrease the lasix to 40 mg, d
aily.
 2.  I encouraged Viviana to maximize her efforts at weight loss, and portion control, although
 her weight is roughly the same.
 3.  I think that some of her pulmonary hypertension could be secondary to her BMI and basel
ine work of breathing.
 4.  Will plan to see her in 6 months at the [facility], [place], [place].  She will continu
e to do her standing order every 2-3 months.
 
 CC:  Nena Dalal M.D., [facility], [facility] HospitalElectronically signed by Marzena mak DO at 07/15/2013  6:26 PM PDTdocumented in this encounter
 
 Plan of Treatment
 
 
 
+--------+-----------+------------+----------------------+-------------------+
| Date   | Type      | Specialty  | Care Team            | Description       |
+--------+-----------+------------+----------------------+-------------------+
| / | Office    | Cardiology |   Tonia Wilson,    |                   |
|    | Visit     |            | [facility]ESSICA  [address]   |                   |
|        |           |            | St  [place], [place]  |                   |
|        |           |            | [address]  [phone]  |                   |
|        |           |            |  [phone] (Fax)  |                   |
+--------+-----------+------------+----------------------+-------------------+
| / | Hospital  | Radiology  |   Popeye Townsend, |                   |
|    | Encounter |            |  MD  [address] |                   |
|        |           |            |  [place],   |                   |
|        |           |            | [place] [address]             |                   |
|        |           |            | [phone]         |                   |
|        |           |            | [phone] (Fax)   |                   |
+--------+-----------+------------+----------------------+-------------------+
| / | Surgery   | Radiology  |   Popeye Townsend, | CV EP PPM SYSTEM  |
|    |           |            |  MD  [address] | IMPLANT           |
|        |           |            |  [place],   |                   |
|        |           |            | [place] [address]             |                   |
|        |           |            | [phone]         |                   |
|        |           |            | [phone] (Fax)   |                   |
+--------+-----------+------------+----------------------+-------------------+
| 02/10/ | Clinical  | Cardiology |                      |                   |
|    | Support   |            |                      |                   |
+--------+-----------+------------+----------------------+-------------------+
| / | Office    | Cardiology |   Katie Tonia,    |                   |
|    | Visit     |            | [facility]  [address]   |                   |
|        |           |            | [place]  |                   |
|        |           |            | [address]  [phone]  |                   |
|        |           |            |  [phone] (Fax)  |                   |
+--------+-----------+------------+----------------------+-------------------+
| / | Off-Site  | Nephrology |   Marzena Moser  |                   |
|    | Visit     |            | DO ETIENNE  [address] [place] [address]      |                   |
|        |           |            | [address]      |                   |
|        |           |            | [place]      |                   |
|        |           |            | [address]  [phone]  |                   |
|        |           |            |  [phone] (Fax)  |                   |
+--------+-----------+------------+----------------------+-------------------+
 documented as of this encounter
 
 Visit Diagnoses
 
 
+------------------------------------------------------------------------------------------+
| Diagnosis                                                                                |
+------------------------------------------------------------------------------------------+
|   Complications of transplanted kidney - Primary                                         |
+------------------------------------------------------------------------------------------+
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
| stage IV, or unspecified(403.90)  Unspecified hypertensive kidney disease with chronic   |
| kidney disease stage I through stage IV, or unspecified                                  |
+------------------------------------------------------------------------------------------+
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
| membranous glomerulonephritis                                                            |
+------------------------------------------------------------------------------------------+
|   Diabetes mellitus, type 2 (HCC)  Type II or unspecified type diabetes mellitus without |
|  mention of complication, not stated as uncontrolled                                     |
 
+------------------------------------------------------------------------------------------+
 documented in this encounter

## 2020-01-08 NOTE — XMS
Encounter Summary
  Created on: 2020
 
 Viviana Ricci
 External Reference #: [id number]
 : 47
 Sex: Female
 
 Demographics
 
 
+-----------------------+----------------------+
| Address               | [address]      |
|                       | [place]  [address] |
+-----------------------+----------------------+
| Home Phone            | [phone]      |
+-----------------------+----------------------+
| Preferred Language    | Unknown              |
+-----------------------+----------------------+
| Marital Status        | Single               |
+-----------------------+----------------------+
| [employer] Affiliation | [id number]                 |
+-----------------------+----------------------+
| Race                  | Unknown              |
+-----------------------+----------------------+
| Ethnic Group          | Unknown              |
+-----------------------+----------------------+
 
 
 Author
 
 
+--------------+--------------------------------------------+
| Author       | [facility] [place]  |
|              | and [place]                                |
+--------------+--------------------------------------------+
| Organization | [facility] [place]  |
|              | and [place]                                |
+--------------+--------------------------------------------+
| Address      | Unknown                                    |
+--------------+--------------------------------------------+
| Phone        | Unavailable                                |
+--------------+--------------------------------------------+
 
 
 
 Support
 
 
+----------------+--------------+---------------------+-----------------+
| Name           | Relationship | Address             | Phone           |
+----------------+--------------+---------------------+-----------------+
| Ada/Ed Radhames | ECON         | [address]              | [phone] |
|                |              | [place]  |                 |
|                |              |  [address]              |                 |
+----------------+--------------+---------------------+-----------------+
 
 
 
 
 Care Team Providers
 
 
+-----------------------+------+-------------+
| Care Team Member Name | Role | Phone       |
+-----------------------+------+-------------+
 PCP  | Unavailable |
+-----------------------+------+-------------+
 
 
 
 Encounter Details
 
 
+--------+-----------+----------------------+----------------------+-------------+
| Date   | Type      | Department           | Care Team            | Description |
+--------+-----------+----------------------+----------------------+-------------+
| / | [facility]  |   [facility] |   Marzena Moser  |             |
|    | Encounter |  MED CTR XRAY  [address] | M, DO  [address]      |             |
|        |           |  [place]       | [place]      |             |
|        |           | [place] [address] | [place]      |             |
|        |           |   [phone]       | [address] [phone]  |             |
|        |           |                      |  [phone] (Fax)  |             |
+--------+-----------+----------------------+----------------------+-------------+
 
 
 
 Social History
 
 
+----------------+-------+-----------+--------+------+
| Tobacco Use    | Types | Packs/Day | Years  | Date |
|                |       |           | Used   |      |
+----------------+-------+-----------+--------+------+
| Never Assessed |       |           |        |      |
+----------------+-------+-----------+--------+------+
 
 
 
+------------------+---------------+
| Sex Assigned at  | Date Recorded |
| Birth            |               |
+------------------+---------------+
| Not on file      |               |
+------------------+---------------+
 
 
 
+----------------+-------------+-------------+
| Job Start Date | Occupation  | Industry    |
+----------------+-------------+-------------+
| Not on file    | Not on file | Not on file |
+----------------+-------------+-------------+
 
 
 
+----------------+--------------+------------+
| Travel History | Travel Start | Travel End |
+----------------+--------------+------------+
 
 
 
 
+-------------------------------------+
| No recent travel history available. |
+-------------------------------------+
 documented as of this encounter
 
 Plan of Treatment
 
 
+--------+-----------+------------+----------------------+-------------------+
| Date   | Type      | Specialty  | Care Team            | Description       |
+--------+-----------+------------+----------------------+-------------------+
| / | Office    | Cardiology |   Tonia Wilson,    |                   |
|    | Visit     |            | ARNJESSICA  [address]   |                   |
|        |           |            | St  [place], [place]  |                   |
|        |           |            | [address]  [phone]  |                   |
|        |           |            |  [phone] (Fax)  |                   |
+--------+-----------+------------+----------------------+-------------------+
| / | Hospital  | Radiology  |   Popeye Townsend, |                   |
|    | Encounter |            |  MD  [address] [place] [address] |                   |
|        |           |            |  St. [place],   |                   |
|        |           |            | [place] [address]             |                   |
|        |           |            | [phone]         |                   |
|        |           |            | [phone] (Fax)   |                   |
+--------+-----------+------------+----------------------+-------------------+
| / | Surgery   | Radiology  |   Popeye Townsend, | CV EP PPM SYSTEM  |
|    |           |            |  MD  [address] | IMPLANT           |
|        |           |            |  [place],   |                   |
|        |           |            | [place] [address]             |                   |
|        |           |            | [phone]         |                   |
|        |           |            | [phone] (Fax)   |                   |
+--------+-----------+------------+----------------------+-------------------+
| 02/10/ | Clinical  | Cardiology |                      |                   |
|    | Support   |            |                      |                   |
+--------+-----------+------------+----------------------+-------------------+
| / | Office    | Cardiology |   Tonia Wilson,    |                   |
|    | Visit     |            | BELKIS  [address]   |                   |
|        |           |            | [place]  |                   |
|        |           |            | [address]  [phone]  |                   |
|        |           |            |  [phone] (Fax)  |                   |
+--------+-----------+------------+----------------------+-------------------+
| / | Off-Site  | Nephrology |   Marzena Moser  |                   |
|    | Visit     |            | DO ETIENNE  [address] [place] [address]      |                   |
|        |           |            | [address]      |                   |
|        |           |            | [place]      |                   |
|        |           |            | [address] [phone]  |                   |
|        |           |            |  [phone] (Fax)  |                   |
+--------+-----------+------------+----------------------+-------------------+
 documented as of this encounter
 
 Visit Diagnoses
 Not on filedocumented in this encounter

## 2020-01-08 NOTE — XMS
Encounter Summary
  Created on: 2020
 
 Viviana Ricci
 External Reference #: [id number]
 : 47
 Sex: Female
 
 Demographics
 
 
+-----------------------+----------------------+
| Address               | [address]      |
|                       | [place]  [address] |
+-----------------------+----------------------+
| Home Phone            | [phone]      |
+-----------------------+----------------------+
| Preferred Language    | Unknown              |
+-----------------------+----------------------+
| Marital Status        | Single               |
+-----------------------+----------------------+
| [employer] Affiliation | [id number]                 |
+-----------------------+----------------------+
| Race                  | Unknown              |
+-----------------------+----------------------+
| Ethnic Group          | Unknown              |
+-----------------------+----------------------+
 
 
 Author
 
 
+--------------+--------------------------------------------+
| Author       | [facility] [place]  |
|              | and [place]                                |
+--------------+--------------------------------------------+
| Organization | [facility] [place]  |
|              | and [place]                                |
+--------------+--------------------------------------------+
| Address      | Unknown                                    |
+--------------+--------------------------------------------+
| Phone        | Unavailable                                |
+--------------+--------------------------------------------+
 
 
 
 Support
 
 
+----------------+--------------+---------------------+-----------------+
| Name           | Relationship | Address             | Phone           |
+----------------+--------------+---------------------+-----------------+
| Ada/Ed Radhames | ECON         | [address]              | [phone] |
|                |              | [place]  |                 |
|                |              |  [address]              |                 |
+----------------+--------------+---------------------+-----------------+
 
 
 
 
 Care Team Providers
 
 
+------------------------+------+-----------------+
| Care Team Member Name  | Role | Phone           |
+------------------------+------+-----------------+
| Marzena Moser DO | PCP  | [phone] |
+------------------------+------+-----------------+
 
 
 
 Reason for Visit
 
 
+-------------------+----------+
| Reason            | Comments |
+-------------------+----------+
| Medication Refill |          |
+-------------------+----------+
 
 
 
 Encounter Details
 
 
+--------+--------+---------------------+----------------------+-------------------+
| Date   | Type   | Department          | Care Team            | Description       |
+--------+--------+---------------------+----------------------+-------------------+
| / | Refill |   [place]         |   Marzena Moser  | Medication Refill |
| 2019   |        | NEPHROLOGY  [address]   | M, DO  [address]      |                   |
|        |        | [address]   | [address]      |                   |
|        |        | [place], [place]     | [place], [place]      |                   |
|        |        | [address]          | [address]  [phone]  |                   |
|        |        | [phone]        |  [phone] (Fax)  |                   |
+--------+--------+---------------------+----------------------+-------------------+
 
 
 
 Social History
 
 
+--------------+-------+-----------+--------+------+
| Tobacco Use  | Types | Packs/Day | Years  | Date |
|              |       |           | Used   |      |
+--------------+-------+-----------+--------+------+
| Never Smoker |       |           |        |      |
+--------------+-------+-----------+--------+------+
 
 
 
+---------------------+---+---+---+
| Smokeless Tobacco:  |   |   |   |
| Never Used          |   |   |   |
+---------------------+---+---+---+
 
 
 
+---------------------------------------------------------------+
| Comments: some second hand smoke exposure, but fairly minimal |
 
+---------------------------------------------------------------+
 
 
 
+-------------+-------------+---------+----------+
| Alcohol Use | Drinks/Week | oz/Week | Comments |
+-------------+-------------+---------+----------+
| No          |             |         |          |
+-------------+-------------+---------+----------+
 
 
 
+------------------+---------------+
| Sex Assigned at  | Date Recorded |
| Birth            |               |
+------------------+---------------+
| Not on file      |               |
+------------------+---------------+
 
 
 
+----------------+-------------+-------------+
| Job Start Date | Occupation  | Industry    |
+----------------+-------------+-------------+
| Not on file    | Not on file | Not on file |
+----------------+-------------+-------------+
 
 
 
+----------------+--------------+------------+
| Travel History | Travel Start | Travel End |
+----------------+--------------+------------+
 
 
 
+-------------------------------------+
| No recent travel history available. |
+-------------------------------------+
 documented as of this encounter
 
 Plan of Treatment
 
 
+--------+-----------+------------+----------------------+-------------------+
| Date   | Type      | Specialty  | Care Team            | Description       |
+--------+-----------+------------+----------------------+-------------------+
| / | Office    | Cardiology |   Tonia Wilson,    |                   |
|    | Visit     |            | ARNP  [address]   |                   |
|        |           |            | St [place], [place]  |                   |
|        |           |            | [address]  [phone]  |                   |
|        |           |            |  [phone] (Fax)  |                   |
+--------+-----------+------------+----------------------+-------------------+
| / | Hospital  | Radiology  |   Popeye Townsend, |                   |
|    | Encounter |            |  MD  [address] |                   |
|        |           |            |  [place],   |                   |
|        |           |            | [place] [address]             |                   |
|        |           |            | [phone]         |                   |
|        |           |            | [phone] (Fax)   |                   |
+--------+-----------+------------+----------------------+-------------------+
| / | Surgery   | Radiology  |   Popeye Townsend, | CV EP PPM SYSTEM  |
 
|    |           |            |  MD  [address] | IMPLANT           |
|        |           |            |  [place],   |                   |
|        |           |            | [place] [address]             |                   |
|        |           |            | [phone]         |                   |
|        |           |            | [phone] (Fax)   |                   |
+--------+-----------+------------+----------------------+-------------------+
| 02/10/ | Clinical  | Cardiology |                      |                   |
|    | Support   |            |                      |                   |
+--------+-----------+------------+----------------------+-------------------+
| / | Office    | Cardiology |   RakeshTonia andre,    |                   |
|    | Visit     |            | ARNP  [address]   |                   |
|        |           |            | [place]  |                   |
|        |           |            | [address] [phone]  |                   |
|        |           |            |  [phone] (Fax)  |                   |
+--------+-----------+------------+----------------------+-------------------+
| / | Off-Site  | Nephrology |   Marzena Moser  |                   |
|    | Visit     |            | DO ETIENNE  [address] [place] [address]      |                   |
|        |           |            | [address]      |                   |
|        |           |            | [place]      |                   |
|        |           |            | [address] [phone]  |                   |
|        |           |            |  [phone] (Fax)  |                   |
+--------+-----------+------------+----------------------+-------------------+
 documented as of this encounter
 
 Visit Diagnoses
 Not on filedocumented in this encounter

## 2020-01-08 NOTE — XMS
Encounter Summary
  Created on: 2020
 
 Viviana Ricci
 External Reference #: [id number]
 : 47
 Sex: Female
 
 Demographics
 
 
+-----------------------+----------------------+
| Address               | [address]      |
|                       | [place]  [address] |
+-----------------------+----------------------+
| Home Phone            | [phone]      |
+-----------------------+----------------------+
| Preferred Language    | Unknown              |
+-----------------------+----------------------+
| Marital Status        | Single               |
+-----------------------+----------------------+
| Shinto Affiliation | [id number]                 |
+-----------------------+----------------------+
| Race                  | Unknown              |
+-----------------------+----------------------+
| Ethnic Group          | Unknown              |
+-----------------------+----------------------+
 
 
 Author
 
 
+--------------+--------------------------------------------+
| Author       | [facility]  |
|              | and [facility]                                |
+--------------+--------------------------------------------+
| Organization | [facility]  |
|              | and [facility]                                |
+--------------+--------------------------------------------+
| Address      | Unknown                                    |
+--------------+--------------------------------------------+
| Phone        | Unavailable                                |
+--------------+--------------------------------------------+
 
 
 
 Support
 
 
+----------------+--------------+---------------------+-----------------+
| Name           | Relationship | Address             | Phone           |
+----------------+--------------+---------------------+-----------------+
| Ada/Ed Radhames | ECON         | [address]              | [phone] |
|                |              | [place]  |                 |
|                |              |  [id number]              |                 |
+----------------+--------------+---------------------+-----------------+
 
 
 
 
 Care Team Providers
 
 
+-----------------------+------+-------------+
| Care Team Member Name | Role | Phone       |
+-----------------------+------+-------------+
 PCP  | Unavailable |
+-----------------------+------+-------------+
 
 
 
 Encounter Details
 
 
+--------+----------+---------------------+----------------------+-------------+
| Date   | Type     | Department          | Care Team            | Description |
+--------+----------+---------------------+----------------------+-------------+
| / | Abstract |   [facility] [place]         |   Marzena Moser  |             |
|    |          | NEPHROLOGY  [address]   | M, DO  [address]      |             |
|        |          | [address]   | [address]      |             |
|        |          | [place], [place]     | [place]      |             |
|        |          | [address]          | [address]  [phone]  |             |
|        |          | [phone]        |  [phone] (Fax)  |             |
+--------+----------+---------------------+----------------------+-------------+
 
 
 
 Social History
 
 
+--------------+-------+-----------+--------+------+
| Tobacco Use  | Types | Packs/Day | Years  | Date |
|              |       |           | Used   |      |
+--------------+-------+-----------+--------+------+
| Never Smoker |       |           |        |      |
+--------------+-------+-----------+--------+------+
 
 
 
+---------------------+---+---+---+
| Smokeless Tobacco:  |   |   |   |
| Never Used          |   |   |   |
+---------------------+---+---+---+
 
 
 
+---------------------------------------------------------------+
| Comments: some second hand smoke exposure, but fairly minimal |
+---------------------------------------------------------------+
 
 
 
+-------------+-------------+---------+----------+
| Alcohol Use | Drinks/Week | oz/Week | Comments |
+-------------+-------------+---------+----------+
| No          |             |         |          |
+-------------+-------------+---------+----------+
 
 
 
 
+------------------+---------------+
| Sex Assigned at  | Date Recorded |
| Birth            |               |
+------------------+---------------+
| Not on file      |               |
+------------------+---------------+
 
 
 
+----------------+-------------+-------------+
| Job Start Date | Occupation  | Industry    |
+----------------+-------------+-------------+
| Not on file    | Not on file | Not on file |
+----------------+-------------+-------------+
 
 
 
+----------------+--------------+------------+
| Travel History | Travel Start | Travel End |
+----------------+--------------+------------+
 
 
 
+-------------------------------------+
| No recent travel history available. |
+-------------------------------------+
 documented as of this encounter
 
 Plan of Treatment
 
 
+--------+-----------+------------+----------------------+-------------------+
| Date   | Type      | Specialty  | Care Team            | Description       |
+--------+-----------+------------+----------------------+-------------------+
| / | Office    | Cardiology |   Tonia Wilson,    |                   |
|    | Visit     |            | ARNJESSICA  [address]   |                   |
|        |           |            | [place]  |                   |
|        |           |            | [address]  [phone]  |                   |
|        |           |            |  [phone] (Fax)  |                   |
+--------+-----------+------------+----------------------+-------------------+
| / | Hospital  | Radiology  |   Popeye Townsend, |                   |
|    | Encounter |            |  MD  [address] |                   |
|        |           |            |  St.  [place],   |                   |
|        |           |            | [place] [address]             |                   |
|        |           |            | [phone]         |                   |
|        |           |            | [phone] (Fax)   |                   |
+--------+-----------+------------+----------------------+-------------------+
| / | Surgery   | Radiology  |   Popeye Townsend, | CV EP PPM SYSTEM  |
|    |           |            |  MD  [address] | IMPLANT           |
|        |           |            |  St.  [place],   |                   |
|        |           |            | [place] [address]             |                   |
|        |           |            | [phone]         |                   |
|        |           |            | [phone] (Fax)   |                   |
+--------+-----------+------------+----------------------+-------------------+
| 02/10/ | Clinical  | Cardiology |                      |                   |
|    | Support   |            |                      |                   |
+--------+-----------+------------+----------------------+-------------------+
| / | Office    | Cardiology |   Tonia Wilson,    |                   |
|    | Visit     |            | ARNP  [address]   |                   |
 
|        |           |            | St  [place], [place]  |                   |
|        |           |            | [address]  [phone]  |                   |
|        |           |            |  [phone] (Fax)  |                   |
+--------+-----------+------------+----------------------+-------------------+
| / | Off-Site  | Nephrology |   Marzena Moser  |                   |
|    | Visit     |            | DO ETIENNE  [address] [place] [address]      |                   |
|        |           |            | [address]      |                   |
|        |           |            | [place]      |                   |
|        |           |            | [address]  [phone]  |                   |
|        |           |            |  [phone] (Fax)  |                   |
+--------+-----------+------------+----------------------+-------------------+
 documented as of this encounter
 
 Procedures
 
 
+----------------------+--------+------------+----------------------+----------------------+
| Procedure Name       | Priori | Date/Time  | Associated Diagnosis | Comments             |
|                      | ty     |            |                      |                      |
+----------------------+--------+------------+----------------------+----------------------+
| EXTERNAL LAB: BUN    | Routin | 2016 |                      |   Results for this   |
|                      | e      |            |                      | procedure are in the |
|                      |        |            |                      |  results section.    |
+----------------------+--------+------------+----------------------+----------------------+
| EXTERNAL LAB:        | Routin | 2016 |                      |   Results for this   |
| GLUCOSE              | e      |            |                      | procedure are in the |
|                      |        |            |                      |  results section.    |
+----------------------+--------+------------+----------------------+----------------------+
| EXTERNAL LAB: ALT    | Routin | 2016 |                      |   Results for this   |
|                      | e      |            |                      | procedure are in the |
|                      |        |            |                      |  results section.    |
+----------------------+--------+------------+----------------------+----------------------+
| EXTERNAL LAB: AST    | Routin | 2016 |                      |   Results for this   |
|                      | e      |            |                      | procedure are in the |
|                      |        |            |                      |  results section.    |
+----------------------+--------+------------+----------------------+----------------------+
| EXTERNAL LAB:        | Routin | 2016 |                      |   Results for this   |
| ALKALINE PHOSPHATASE | e      |            |                      | procedure are in the |
|                      |        |            |                      |  results section.    |
+----------------------+--------+------------+----------------------+----------------------+
| EXTERNAL LAB:        | Routin | 2016 |                      |   Results for this   |
| BILIRUBIN, TOTAL     | e      |            |                      | procedure are in the |
|                      |        |            |                      |  results section.    |
+----------------------+--------+------------+----------------------+----------------------+
| EXTERNAL LAB:        | Routin | 2016 |                      |   Results for this   |
| ALBUMIN              | e      |            |                      | procedure are in the |
|                      |        |            |                      |  results section.    |
+----------------------+--------+------------+----------------------+----------------------+
| EXTERNAL LAB:        | Routin | 2016 |                      |   Results for this   |
| PROTEIN, TOTAL       | e      |            |                      | procedure are in the |
|                      |        |            |                      |  results section.    |
+----------------------+--------+------------+----------------------+----------------------+
| EXTERNAL LAB:        | Routin | 2016 |                      |   Results for this   |
| MAGNESIUM            | e      |            |                      | procedure are in the |
|                      |        |            |                      |  results section.    |
+----------------------+--------+------------+----------------------+----------------------+
| EXTERNAL LAB:        | Routin | 2016 |                      |   Results for this   |
| CALCIUM              | e      |            |                      | procedure are in the |
|                      |        |            |                      |  results section.    |
+----------------------+--------+------------+----------------------+----------------------+
 
| EXTERNAL LAB: CARBON | Routin | 2016 |                      |   Results for this   |
|  DIOXIDE             | e      |            |                      | procedure are in the |
|                      |        |            |                      |  results section.    |
+----------------------+--------+------------+----------------------+----------------------+
| EXTERNAL LAB:        | Routin | 2016 |                      |   Results for this   |
| CHLORIDE             | e      |            |                      | procedure are in the |
|                      |        |            |                      |  results section.    |
+----------------------+--------+------------+----------------------+----------------------+
| EXTERNAL LAB:        | Routin | 2016 |                      |   Results for this   |
| POTASSIUM            | e      |            |                      | procedure are in the |
|                      |        |            |                      |  results section.    |
+----------------------+--------+------------+----------------------+----------------------+
| EXTERNAL LAB: SODIUM | Routin | 2016 |                      |   Results for this   |
|                      | e      |            |                      | procedure are in the |
|                      |        |            |                      |  results section.    |
+----------------------+--------+------------+----------------------+----------------------+
| EXTERNAL LAB: EVY   | Routin | 2016 |                      |   Results for this   |
| INTACT               | e      |            |                      | procedure are in the |
|                      |        |            |                      |  results section.    |
+----------------------+--------+------------+----------------------+----------------------+
| EXTERNAL LAB: BILLY    | Routin | 2016 |                      |   Results for this   |
|                      | e      |            |                      | procedure are in the |
|                      |        |            |                      |  results section.    |
+----------------------+--------+------------+----------------------+----------------------+
| EXTERNAL LAB:        | Routin | 2016 |                      |   Results for this   |
| TRIGLYCERIDES        | e      |            |                      | procedure are in the |
|                      |        |            |                      |  results section.    |
+----------------------+--------+------------+----------------------+----------------------+
| EXTERNAL LAB:        | Routin | 2016 |                      |   Results for this   |
| CHOLESTEROL, HDL     | e      |            |                      | procedure are in the |
|                      |        |            |                      |  results section.    |
+----------------------+--------+------------+----------------------+----------------------+
| EXTERNAL LAB:        | Routin | 2016 |                      |   Results for this   |
| CHOLESTEROL, TOTAL   | e      |            |                      | procedure are in the |
|                      |        |            |                      |  results section.    |
+----------------------+--------+------------+----------------------+----------------------+
| EXTERNAL LAB:        | Routin | 2016 |                      |   Results for this   |
| CHOLESTEROL, LDL     | e      |            |                      | procedure are in the |
| DIRECT               |        |            |                      |  results section.    |
+----------------------+--------+------------+----------------------+----------------------+
| EXTERNAL LAB: EGFR   | Routin | 2016 |                      |   Results for this   |
|                      | e      |            |                      | procedure are in the |
|                      |        |            |                      |  results section.    |
+----------------------+--------+------------+----------------------+----------------------+
| EXTERNAL LAB:        | Routin | 2016 |                      |   Results for this   |
| CREATININE           | e      |            |                      | procedure are in the |
|                      |        |            |                      |  results section.    |
+----------------------+--------+------------+----------------------+----------------------+
| HEMOGLOBIN A1C       | Routin | 2016 |                      |   Results for this   |
|                      | e      |            |                      | procedure are in the |
|                      |        |            |                      |  results section.    |
+----------------------+--------+------------+----------------------+----------------------+
 documented in this encounter
 
 Results
 External Lab: PTH, Intact (2016)
 
+-------------+-----------+-----------+------------+--------------+
| Component   | Value     | Ref Range | Performed  | Pathologist  |
|             |           |           | At         | Signature    |
 
+-------------+-----------+-----------+------------+--------------+
| PTH Intact, | 204.9 (A) | 15 - 65   |            |              |
|  External   |           |           |            |              |
+-------------+-----------+-----------+------------+--------------+
 
 
 
+----------+
| Specimen |
+----------+
|          |
+----------+
 Hemoglobin A1C (2016)
 
+-------------+-------+-----------+------------+--------------+
| Component   | Value | Ref Range | Performed  | Pathologist  |
|             |       |           | At         | Signature    |
+-------------+-------+-----------+------------+--------------+
| Hemoglobin  | 7.8   |           | EXTERNAL   |              |
| A1c,        |       |           | LAB        |              |
| external    |       |           |            |              |
+-------------+-------+-----------+------------+--------------+
 
 
 
+-----------------+
| Specimen        |
+-----------------+
| Blood specimen  |
| (specimen)      |
+-----------------+
 
 
 
+--------------------------+
| Resulting Agency Comment |
+--------------------------+
|   [facility]              |
+--------------------------+
 
 
 
+----------------+---------+--------------------+--------------+
| Performing     | Address | City/State/Zipcode | Phone Number |
| Organization   |         |                    |              |
+----------------+---------+--------------------+--------------+
|   EXTERNAL LAB |         |                    |              |
+----------------+---------+--------------------+--------------+
 External Lab: BUN (2016)
 
+-----------+--------+-----------+------------+--------------+
| Component | Value  | Ref Range | Performed  | Pathologist  |
|           |        |           | At         | Signature    |
+-----------+--------+-----------+------------+--------------+
| BUN,      | 29 (A) | 6 - 23    | EXTERNAL   |              |
| External  |        |           | LAB        |              |
+-----------+--------+-----------+------------+--------------+
 
 
 
 
+--------------------------+
| Resulting Agency Comment |
+--------------------------+
|   [facility]              |
+--------------------------+
 
 
 
+----------------+---------+--------------------+--------------+
| Performing     | Address | City/State/Zipcode | Phone Number |
| Organization   |         |                    |              |
+----------------+---------+--------------------+--------------+
|   EXTERNAL LAB |         |                    |              |
+----------------+---------+--------------------+--------------+
 External Lab: Glucose (2016)
 
+-----------+-------+-----------+------------+--------------+
| Component | Value | Ref Range | Performed  | Pathologist  |
|           |       |           | At         | Signature    |
+-----------+-------+-----------+------------+--------------+
| Glucose,  | 150   | 70 - 150  | EXTERNAL   |              |
| External  |       |           | LAB        |              |
+-----------+-------+-----------+------------+--------------+
 
 
 
+--------------------------+
| Resulting Agency Comment |
+--------------------------+
|   [facility]              |
+--------------------------+
 
 
 
+----------------+---------+--------------------+--------------+
| Performing     | Address | City/State/Zipcode | Phone Number |
| Organization   |         |                    |              |
+----------------+---------+--------------------+--------------+
|   EXTERNAL LAB |         |                    |              |
+----------------+---------+--------------------+--------------+
 External Lab: ALT (2016)
 
+-----------+-------+-----------+------------+--------------+
| Component | Value | Ref Range | Performed  | Pathologist  |
|           |       |           | At         | Signature    |
+-----------+-------+-----------+------------+--------------+
| ALT,      | 17    | 7 - 52    | EXTERNAL   |              |
| External  |       |           | LAB        |              |
+-----------+-------+-----------+------------+--------------+
 
 
 
+--------------------------+
| Resulting Agency Comment |
+--------------------------+
|   [facility]              |
+--------------------------+
 
 
 
 
+----------------+---------+--------------------+--------------+
| Performing     | Address | City/State/Zipcode | Phone Number |
| Organization   |         |                    |              |
+----------------+---------+--------------------+--------------+
|   EXTERNAL LAB |         |                    |              |
+----------------+---------+--------------------+--------------+
 External Lab: AST (2016)
 
+-----------+-------+-----------+------------+--------------+
| Component | Value | Ref Range | Performed  | Pathologist  |
|           |       |           | At         | Signature    |
+-----------+-------+-----------+------------+--------------+
| AST,      | 24    | 13 - 39   | EXTERNAL   |              |
| External  |       |           | LAB        |              |
+-----------+-------+-----------+------------+--------------+
 
 
 
+--------------------------+
| Resulting Agency Comment |
+--------------------------+
|   [facility]              |
+--------------------------+
 
 
 
+----------------+---------+--------------------+--------------+
| Performing     | Address | City/State/Zipcode | Phone Number |
| Organization   |         |                    |              |
+----------------+---------+--------------------+--------------+
|   EXTERNAL LAB |         |                    |              |
+----------------+---------+--------------------+--------------+
 External Lab: Alkaline Phosphatase (2016)
 
+-----------+-------+-----------+------------+--------------+
| Component | Value | Ref Range | Performed  | Pathologist  |
|           |       |           | At         | Signature    |
+-----------+-------+-----------+------------+--------------+
| ALP,      | 115   | 30 - 128  | EXTERNAL   |              |
| External  |       |           | LAB        |              |
+-----------+-------+-----------+------------+--------------+
 
 
 
+--------------------------+
| Resulting Agency Comment |
+--------------------------+
|   [facility]              |
+--------------------------+
 
 
 
+----------------+---------+--------------------+--------------+
| Performing     | Address | City/State/Zipcode | Phone Number |
| Organization   |         |                    |              |
+----------------+---------+--------------------+--------------+
|   EXTERNAL LAB |         |                    |              |
+----------------+---------+--------------------+--------------+
 External Lab: Bilirubin, Total (2016)
 
 
+-------------+-------+-----------+------------+--------------+
| Component   | Value | Ref Range | Performed  | Pathologist  |
|             |       |           | At         | Signature    |
+-------------+-------+-----------+------------+--------------+
| Bilirubin,  | 0.6   | 0 - 1.2   | EXTERNAL   |              |
| Total,      |       |           | LAB        |              |
| External    |       |           |            |              |
+-------------+-------+-----------+------------+--------------+
 
 
 
+--------------------------+
| Resulting Agency Comment |
+--------------------------+
|   [facility]              |
+--------------------------+
 
 
 
+----------------+---------+--------------------+--------------+
| Performing     | Address | City/State/Zipcode | Phone Number |
| Organization   |         |                    |              |
+----------------+---------+--------------------+--------------+
|   EXTERNAL LAB |         |                    |              |
+----------------+---------+--------------------+--------------+
 External Lab: Albumin (2016)
 
+-----------+-------+-----------+------------+--------------+
| Component | Value | Ref Range | Performed  | Pathologist  |
|           |       |           | At         | Signature    |
+-----------+-------+-----------+------------+--------------+
| Albumin,  | 3.5   | 3.5 - 5   | EXTERNAL   |              |
| External  |       |           | LAB        |              |
+-----------+-------+-----------+------------+--------------+
 
 
 
+--------------------------+
| Resulting Agency Comment |
+--------------------------+
|   [facility]              |
+--------------------------+
 
 
 
+----------------+---------+--------------------+--------------+
| Performing     | Address | City/State/Zipcode | Phone Number |
| Organization   |         |                    |              |
+----------------+---------+--------------------+--------------+
|   EXTERNAL LAB |         |                    |              |
+----------------+---------+--------------------+--------------+
 External Lab: Protein, Total (2016)
 
+-----------+---------+-----------+------------+--------------+
| Component | Value   | Ref Range | Performed  | Pathologist  |
|           |         |           | At         | Signature    |
+-----------+---------+-----------+------------+--------------+
| Protein,  | 5.7 (A) | 6 - 8     | EXTERNAL   |              |
| Total,    |         |           | LAB        |              |
| External  |         |           |            |              |
 
+-----------+---------+-----------+------------+--------------+
 
 
 
+--------------------------+
| Resulting Agency Comment |
+--------------------------+
|   [facility]              |
+--------------------------+
 
 
 
+----------------+---------+--------------------+--------------+
| Performing     | Address | City/State/Zipcode | Phone Number |
| Organization   |         |                    |              |
+----------------+---------+--------------------+--------------+
|   EXTERNAL LAB |         |                    |              |
+----------------+---------+--------------------+--------------+
 External Lab: Magnesium (2016)
 
+-------------+---------+-----------+------------+--------------+
| Component   | Value   | Ref Range | Performed  | Pathologist  |
|             |         |           | At         | Signature    |
+-------------+---------+-----------+------------+--------------+
| Magnesium,  | 1.6 (A) | 1.7 - 2.5 | EXTERNAL   |              |
| External    |         |           | LAB        |              |
+-------------+---------+-----------+------------+--------------+
 
 
 
+--------------------------+
| Resulting Agency Comment |
+--------------------------+
|   [facility]              |
+--------------------------+
 
 
 
+----------------+---------+--------------------+--------------+
| Performing     | Address | City/State/Zipcode | Phone Number |
| Organization   |         |                    |              |
+----------------+---------+--------------------+--------------+
|   EXTERNAL LAB |         |                    |              |
+----------------+---------+--------------------+--------------+
 External Lab: Calcium (2016)
 
+-----------+-------+------------+------------+--------------+
| Component | Value | Ref Range  | Performed  | Pathologist  |
|           |       |            | At         | Signature    |
+-----------+-------+------------+------------+--------------+
| Calcium,  | 9.4   | 8.4 - 10.2 | EXTERNAL   |              |
| External  |       |            | LAB        |              |
+-----------+-------+------------+------------+--------------+
 
 
 
+--------------------------+
| Resulting Agency Comment |
+--------------------------+
|   [facility]              |
 
+--------------------------+
 
 
 
+----------------+---------+--------------------+--------------+
| Performing     | Address | City/State/Zipcode | Phone Number |
| Organization   |         |                    |              |
+----------------+---------+--------------------+--------------+
|   EXTERNAL LAB |         |                    |              |
+----------------+---------+--------------------+--------------+
 External Lab: Carbon Dioxide (2016)
 
+-----------+-------+-----------+------------+--------------+
| Component | Value | Ref Range | Performed  | Pathologist  |
|           |       |           | At         | Signature    |
+-----------+-------+-----------+------------+--------------+
| Carbon    | 21    | 19 - 31   | EXTERNAL   |              |
| Dioxide,  |       |           | LAB        |              |
| External  |       |           |            |              |
+-----------+-------+-----------+------------+--------------+
 
 
 
+--------------------------+
| Resulting Agency Comment |
+--------------------------+
|   [facility]              |
+--------------------------+
 
 
 
+----------------+---------+--------------------+--------------+
| Performing     | Address | City/State/Zipcode | Phone Number |
| Organization   |         |                    |              |
+----------------+---------+--------------------+--------------+
|   EXTERNAL LAB |         |                    |              |
+----------------+---------+--------------------+--------------+
 External Lab: Chloride (2016)
 
+------------+-------+-----------+------------+--------------+
| Component  | Value | Ref Range | Performed  | Pathologist  |
|            |       |           | At         | Signature    |
+------------+-------+-----------+------------+--------------+
| Chloride,  | 106   | 95 - 112  | EXTERNAL   |              |
| External   |       |           | LAB        |              |
+------------+-------+-----------+------------+--------------+
 
 
 
+--------------------------+
| Resulting Agency Comment |
+--------------------------+
|   [facility]              |
+--------------------------+
 
 
 
+----------------+---------+--------------------+--------------+
| Performing     | Address | City/State/Zipcode | Phone Number |
| Organization   |         |                    |              |
 
+----------------+---------+--------------------+--------------+
|   EXTERNAL LAB |         |                    |              |
+----------------+---------+--------------------+--------------+
 External Lab: Potassium (2016)
 
+-------------+-------+-----------+------------+--------------+
| Component   | Value | Ref Range | Performed  | Pathologist  |
|             |       |           | At         | Signature    |
+-------------+-------+-----------+------------+--------------+
| Potassium,  | 4.5   | 3.6 - 5.1 | EXTERNAL   |              |
| External    |       |           | LAB        |              |
+-------------+-------+-----------+------------+--------------+
 
 
 
+--------------------------+
| Resulting Agency Comment |
+--------------------------+
|   [facility]              |
+--------------------------+
 
 
 
+----------------+---------+--------------------+--------------+
| Performing     | Address | City/State/Zipcode | Phone Number |
| Organization   |         |                    |              |
+----------------+---------+--------------------+--------------+
|   EXTERNAL LAB |         |                    |              |
+----------------+---------+--------------------+--------------+
 External Lab: Sodium (2016)
 
+-----------+-------+-----------+------------+--------------+
| Component | Value | Ref Range | Performed  | Pathologist  |
|           |       |           | At         | Signature    |
+-----------+-------+-----------+------------+--------------+
| Sodium,   | 138   | 132 - 143 | EXTERNAL   |              |
| External  |       |           | LAB        |              |
+-----------+-------+-----------+------------+--------------+
 
 
 
+--------------------------+
| Resulting Agency Comment |
+--------------------------+
|   [facility]              |
+--------------------------+
 
 
 
+----------------+---------+--------------------+--------------+
| Performing     | Address | City/State/Zipcode | Phone Number |
| Organization   |         |                    |              |
+----------------+---------+--------------------+--------------+
|   EXTERNAL LAB |         |                    |              |
+----------------+---------+--------------------+--------------+
 External Lab: CBC (2016)
 
+-------------+---------+-----------+------------+--------------+
| Component   | Value   | Ref Range | Performed  | Pathologist  |
|             |         |           | At         | Signature    |
 
+-------------+---------+-----------+------------+--------------+
| WBC,        | 4.7     | 4.5 - 11  | EXTERNAL   |              |
| External    |         |           | LAB        |              |
+-------------+---------+-----------+------------+--------------+
| HGB,        | 13.7    | 12 - 16   | EXTERNAL   |              |
| External    |         |           | LAB        |              |
+-------------+---------+-----------+------------+--------------+
| HCT,        | 41.6    | 35 - 45   | EXTERNAL   |              |
| External    |         |           | LAB        |              |
+-------------+---------+-----------+------------+--------------+
| PLT,        | 156     | 140 - 440 | EXTERNAL   |              |
| External    |         |           | LAB        |              |
+-------------+---------+-----------+------------+--------------+
| Neutrophils | 59.6    |           | EXTERNAL   |              |
|   %,        |         |           | LAB        |              |
| External    |         |           |            |              |
+-------------+---------+-----------+------------+--------------+
| Lymphocytes | 30.3    |           | EXTERNAL   |              |
|  %,         |         |           | LAB        |              |
| External    |         |           |            |              |
+-------------+---------+-----------+------------+--------------+
| Monocytes   | 7.3     |           | EXTERNAL   |              |
| %, External |         |           | LAB        |              |
+-------------+---------+-----------+------------+--------------+
| Eosinophils | 2.4     |           | EXTERNAL   |              |
|  %,         |         |           | LAB        |              |
| External    |         |           |            |              |
+-------------+---------+-----------+------------+--------------+
| RBC,        | 4.07    | 3.8 - 5.1 | EXTERNAL   |              |
| External    |         |           | LAB        |              |
+-------------+---------+-----------+------------+--------------+
| MCV,        | 102 (A) | 81 - 99   | EXTERNAL   |              |
| External    |         |           | LAB        |              |
+-------------+---------+-----------+------------+--------------+
| RDW,        | 14      | 10.5 - 15 | EXTERNAL   |              |
| External    |         |           | LAB        |              |
+-------------+---------+-----------+------------+--------------+
 
 
 
+--------------------------+
| Resulting Agency Comment |
+--------------------------+
|   [facility]              |
+--------------------------+
 
 
 
+----------------+---------+--------------------+--------------+
| Performing     | Address | City/State/Zipcode | Phone Number |
| Organization   |         |                    |              |
+----------------+---------+--------------------+--------------+
|   EXTERNAL LAB |         |                    |              |
+----------------+---------+--------------------+--------------+
 External Lab: Triglycerides (2016)
 
+-------------+---------+-----------+------------+--------------+
| Component   | Value   | Ref Range | Performed  | Pathologist  |
|             |         |           | At         | Signature    |
+-------------+---------+-----------+------------+--------------+
 
| Triglycerid | 207 (A) | 30 - 150  | EXTERNAL   |              |
| es,         |         |           | LAB        |              |
| External    |         |           |            |              |
+-------------+---------+-----------+------------+--------------+
 
 
 
+-----------------+
| Specimen        |
+-----------------+
| Blood specimen  |
| (specimen)      |
+-----------------+
 
 
 
+--------------------------+
| Resulting Agency Comment |
+--------------------------+
|   [facility]              |
+--------------------------+
 
 
 
+----------------+---------+--------------------+--------------+
| Performing     | Address | City/State/Zipcode | Phone Number |
| Organization   |         |                    |              |
+----------------+---------+--------------------+--------------+
|   EXTERNAL LAB |         |                    |              |
+----------------+---------+--------------------+--------------+
 External Lab: Cholesterol, HDL (2016)
 
+-------------+----------+-----------+------------+--------------+
| Component   | Value    | Ref Range | Performed  | Pathologist  |
|             |          |           | At         | Signature    |
+-------------+----------+-----------+------------+--------------+
| HDL         | 55.8 (A) | 40 mg/dl  | EXTERNAL   |              |
| Cholesterol |          |           | LAB        |              |
| , External  |          |           |            |              |
+-------------+----------+-----------+------------+--------------+
 
 
 
+-----------------+
| Specimen        |
+-----------------+
| Blood specimen  |
| (specimen)      |
+-----------------+
 
 
 
+--------------------------+
| Resulting Agency Comment |
+--------------------------+
|   [facility]              |
+--------------------------+
 
 
 
 
+----------------+---------+--------------------+--------------+
| Performing     | Address | City/State/Zipcode | Phone Number |
| Organization   |         |                    |              |
+----------------+---------+--------------------+--------------+
|   EXTERNAL LAB |         |                    |              |
+----------------+---------+--------------------+--------------+
 External Lab: Cholesterol, Total (2016)
 
+-------------+-------+-----------+------------+--------------+
| Component   | Value | Ref Range | Performed  | Pathologist  |
|             |       |           | At         | Signature    |
+-------------+-------+-----------+------------+--------------+
| Cholesterol | 175   | 200 mg/dl | EXTERNAL   |              |
| , Total,    |       |           | LAB        |              |
| External    |       |           |            |              |
+-------------+-------+-----------+------------+--------------+
 
 
 
+-----------------+
| Specimen        |
+-----------------+
| Blood specimen  |
| (specimen)      |
+-----------------+
 
 
 
+--------------------------+
| Resulting Agency Comment |
+--------------------------+
|   [facility]              |
+--------------------------+
 
 
 
+----------------+---------+--------------------+--------------+
| Performing     | Address | City/State/Zipcode | Phone Number |
| Organization   |         |                    |              |
+----------------+---------+--------------------+--------------+
|   EXTERNAL LAB |         |                    |              |
+----------------+---------+--------------------+--------------+
 External Lab: Cholesterol, LDL Direct (2016)
 
+-------------+-------+-----------+------------+--------------+
| Component   | Value | Ref Range | Performed  | Pathologist  |
|             |       |           | At         | Signature    |
+-------------+-------+-----------+------------+--------------+
| LDL         | 78    | 100       | EXTERNAL   |              |
| Cholesterol |       |           | LAB        |              |
| , Direct,   |       |           |            |              |
| External    |       |           |            |              |
+-------------+-------+-----------+------------+--------------+
 
 
 
+-----------------+
| Specimen        |
+-----------------+
| Blood specimen  |
 
| (specimen)      |
+-----------------+
 
 
 
+--------------------------+
| Resulting Agency Comment |
+--------------------------+
|   [facility]              |
+--------------------------+
 
 
 
+----------------+---------+--------------------+--------------+
| Performing     | Address | City/State/Zipcode | Phone Number |
| Organization   |         |                    |              |
+----------------+---------+--------------------+--------------+
|   EXTERNAL LAB |         |                    |              |
+----------------+---------+--------------------+--------------+
 External Lab: eGFR (2016)
 
+-----------+-------+-----------+------------+--------------+
| Component | Value | Ref Range | Performed  | Pathologist  |
|           |       |           | At         | Signature    |
+-----------+-------+-----------+------------+--------------+
| eGFR,     | 46    |           | EXTERNAL   |              |
| External  |       |           | LAB        |              |
+-----------+-------+-----------+------------+--------------+
 
 
 
+-----------------+
| Specimen        |
+-----------------+
| Blood specimen  |
| (specimen)      |
+-----------------+
 
 
 
+--------------------------+
| Resulting Agency Comment |
+--------------------------+
|   [facility]              |
+--------------------------+
 
 
 
+----------------+---------+--------------------+--------------+
| Performing     | Address | City/State/Zipcode | Phone Number |
| Organization   |         |                    |              |
+----------------+---------+--------------------+--------------+
|   EXTERNAL LAB |         |                    |              |
+----------------+---------+--------------------+--------------+
 External Lab: Creatinine (2016)
 
+-------------+-------+------------+------------+--------------+
| Component   | Value | Ref Range  | Performed  | Pathologist  |
|             |       |            | At         | Signature    |
+-------------+-------+------------+------------+--------------+
 
| Creatinine, | 1.16  | 0.7 - 1.25 | EXTERNAL   |              |
|  External   |       |            | LAB        |              |
+-------------+-------+------------+------------+--------------+
 
 
 
+-----------------+
| Specimen        |
+-----------------+
| Blood specimen  |
| (specimen)      |
+-----------------+
 
 
 
+--------------------------+
| Resulting Agency Comment |
+--------------------------+
|   [facility]              |
+--------------------------+
 
 
 
+----------------+---------+--------------------+--------------+
| Performing     | Address | City/State/Zipcode | Phone Number |
| Organization   |         |                    |              |
+----------------+---------+--------------------+--------------+
|   EXTERNAL LAB |         |                    |              |
+----------------+---------+--------------------+--------------+
 documented in this encounter
 
 Visit Diagnoses
 Not on filedocumented in this encounter

## 2020-01-08 NOTE — XMS
Encounter Summary
  Created on: 2020
 
 Viviana Ricci
 External Reference #: [id number]
 : 47
 Sex: Female
 
 Demographics
 
 
+-----------------------+----------------------+
| Address               | [address]      |
|                       | [place]  [address] |
+-----------------------+----------------------+
| Home Phone            | [phone]      |
+-----------------------+----------------------+
| Preferred Language    | Unknown              |
+-----------------------+----------------------+
| Marital Status        | Single               |
+-----------------------+----------------------+
| Congregation Affiliation | 1009                 |
+-----------------------+----------------------+
| Race                  | Unknown              |
+-----------------------+----------------------+
| Ethnic Group          | Unknown              |
+-----------------------+----------------------+
 
 
 Author
 
 
+--------------+--------------------------------------------+
| Author       | [facility]  |
|              | and [facility]                                |
+--------------+--------------------------------------------+
| Organization | [facility]  |
|              | and [facility]                                |
+--------------+--------------------------------------------+
| Address      | Unknown                                    |
+--------------+--------------------------------------------+
| Phone        | Unavailable                                |
+--------------+--------------------------------------------+
 
 
 
 Support
 
 
+----------------+--------------+---------------------+-----------------+
| Name           | Relationship | Address             | Phone           |
+----------------+--------------+---------------------+-----------------+
| Ada/Ed Radhames | ECON         | [address]              | [phone] |
|                |              | [place]  |                 |
|                |              |  [address]              |                 |
+----------------+--------------+---------------------+-----------------+
 
 
 
 
 Care Team Providers
 
 
+-----------------------+------+-------------+
| Care Team Member Name | Role | Phone       |
+-----------------------+------+-------------+
 PCP  | Unavailable |
+-----------------------+------+-------------+
 
 
 
 Encounter Details
 
 
+--------+----------+---------------------+----------------------+-------------+
| Date   | Type     | Department          | Care Team            | Description |
+--------+----------+---------------------+----------------------+-------------+
| / | Abstract |   [facility] [place]         |   Marzena Moser  |             |
|    |          | NEPHROLOGY  [address]   | M, DO  [address]      |             |
|        |          | [address]   | [address]      |             |
|        |          | [place], [place]     | [place]      |             |
|        |          | [address]          | [address]  [phone]  |             |
|        |          | [phone]        |  [phone] (Fax)  |             |
+--------+----------+---------------------+----------------------+-------------+
 
 
 
 Social History
 
 
+--------------+-------+-----------+--------+------+
| Tobacco Use  | Types | Packs/Day | Years  | Date |
|              |       |           | Used   |      |
+--------------+-------+-----------+--------+------+
| Never Smoker |       |           |        |      |
+--------------+-------+-----------+--------+------+
 
 
 
+---------------------+---+---+---+
| Smokeless Tobacco:  |   |   |   |
| Never Used          |   |   |   |
+---------------------+---+---+---+
 
 
 
+---------------------------------------------------------------+
| Comments: some second hand smoke exposure, but fairly minimal |
+---------------------------------------------------------------+
 
 
 
+-------------+-------------+---------+----------+
| Alcohol Use | Drinks/Week | oz/Week | Comments |
+-------------+-------------+---------+----------+
| No          |             |         |          |
+-------------+-------------+---------+----------+
 
 
 
 
+------------------+---------------+
| Sex Assigned at  | Date Recorded |
| Birth            |               |
+------------------+---------------+
| Not on file      |               |
+------------------+---------------+
 
 
 
+----------------+-------------+-------------+
| Job Start Date | Occupation  | Industry    |
+----------------+-------------+-------------+
| Not on file    | Not on file | Not on file |
+----------------+-------------+-------------+
 
 
 
+----------------+--------------+------------+
| Travel History | Travel Start | Travel End |
+----------------+--------------+------------+
 
 
 
+-------------------------------------+
| No recent travel history available. |
+-------------------------------------+
 documented as of this encounter
 
 Plan of Treatment
 
 
+--------+-----------+------------+----------------------+-------------------+
| Date   | Type      | Specialty  | Care Team            | Description       |
+--------+-----------+------------+----------------------+-------------------+
| / | Office    | Cardiology |   Tonia Wilson,    |                   |
|    | Visit     |            | ARNJESSICA  [address]   |                   |
|        |           |            | [place]  |                   |
|        |           |            | [address]  [phone]  |                   |
|        |           |            |  [phone] (Fax)  |                   |
+--------+-----------+------------+----------------------+-------------------+
| / | Hospital  | Radiology  |   Popeye Townsend, |                   |
|    | Encounter |            |  MD  [address] |                   |
|        |           |            |  St.  [place],   |                   |
|        |           |            | [place] [address]             |                   |
|        |           |            | [phone]         |                   |
|        |           |            | [phone] (Fax)   |                   |
+--------+-----------+------------+----------------------+-------------------+
| / | Surgery   | Radiology  |   Popeye Townsend, | CV EP PPM SYSTEM  |
|    |           |            |  MD  [address] | IMPLANT           |
|        |           |            |  St.  [place],   |                   |
|        |           |            | [place] [address]             |                   |
|        |           |            | [phone]         |                   |
|        |           |            | [phone] (Fax)   |                   |
+--------+-----------+------------+----------------------+-------------------+
| 02/10/ | Clinical  | Cardiology |                      |                   |
|    | Support   |            |                      |                   |
+--------+-----------+------------+----------------------+-------------------+
| / | Office    | Cardiology |   Tonia Wilson,    |                   |
|    | Visit     |            | ARNP  [address]   |                   |
 
|        |           |            | St  [place], [place]  |                   |
|        |           |            | [address]  [phone]  |                   |
|        |           |            |  [phone] (Fax)  |                   |
+--------+-----------+------------+----------------------+-------------------+
| / | Off-Site  | Nephrology |   Marzena Moser  |                   |
| 2020   | Visit     |            | DO ETIENNE  [address] [place] [address]      |                   |
|        |           |            | [address]      |                   |
|        |           |            | [place]      |                   |
|        |           |            | [address]  [phone]  |                   |
|        |           |            |  [phone] (Fax)  |                   |
+--------+-----------+------------+----------------------+-------------------+
 documented as of this encounter
 
 Procedures
 
 
+----------------------+--------+------------+----------------------+----------------------+
| Procedure Name       | Priori | Date/Time  | Associated Diagnosis | Comments             |
|                      | ty     |            |                      |                      |
+----------------------+--------+------------+----------------------+----------------------+
| EXTERNAL LAB:        | Routin | 2015 |                      |   Results for this   |
| GLUCOSE              | e      |            |                      | procedure are in the |
|                      |        |            |                      |  results section.    |
+----------------------+--------+------------+----------------------+----------------------+
| EXTERNAL LAB:        | Routin | 2015 |                      |   Results for this   |
| TACROLIMUS LEVEL,    | e      |            |                      | procedure are in the |
| LC-MS/MS             |        |            |                      |  results section.    |
+----------------------+--------+------------+----------------------+----------------------+
| EXTERNAL LAB: ALT    | Routin | 2015 |                      |   Results for this   |
|                      | e      |            |                      | procedure are in the |
|                      |        |            |                      |  results section.    |
+----------------------+--------+------------+----------------------+----------------------+
| EXTERNAL LAB: AST    | Routin | 2015 |                      |   Results for this   |
|                      | e      |            |                      | procedure are in the |
|                      |        |            |                      |  results section.    |
+----------------------+--------+------------+----------------------+----------------------+
| EXTERNAL LAB:        | Routin | 2015 |                      |   Results for this   |
| ALKALINE PHOSPHATASE | e      |            |                      | procedure are in the |
|                      |        |            |                      |  results section.    |
+----------------------+--------+------------+----------------------+----------------------+
| EXTERNAL LAB:        | Routin | 2015 |                      |   Results for this   |
| BILIRUBIN, TOTAL     | e      |            |                      | procedure are in the |
|                      |        |            |                      |  results section.    |
+----------------------+--------+------------+----------------------+----------------------+
| EXTERNAL LAB:        | Routin | 2015 |                      |   Results for this   |
| ALBUMIN              | e      |            |                      | procedure are in the |
|                      |        |            |                      |  results section.    |
+----------------------+--------+------------+----------------------+----------------------+
| EXTERNAL LAB:        | Routin | 2015 |                      |   Results for this   |
| PROTEIN, TOTAL       | e      |            |                      | procedure are in the |
|                      |        |            |                      |  results section.    |
+----------------------+--------+------------+----------------------+----------------------+
| EXTERNAL LAB:        | Routin | 2015 |                      |   Results for this   |
| PHOSPHORUS           | e      |            |                      | procedure are in the |
|                      |        |            |                      |  results section.    |
+----------------------+--------+------------+----------------------+----------------------+
| EXTERNAL LAB:        | Routin | 2015 |                      |   Results for this   |
| MAGNESIUM            | e      |            |                      | procedure are in the |
|                      |        |            |                      |  results section.    |
+----------------------+--------+------------+----------------------+----------------------+
 
| EXTERNAL LAB:        | Routin | 2015 |                      |   Results for this   |
| CALCIUM              | e      |            |                      | procedure are in the |
|                      |        |            |                      |  results section.    |
+----------------------+--------+------------+----------------------+----------------------+
| EXTERNAL LAB: CARBON | Routin | 2015 |                      |   Results for this   |
|  DIOXIDE             | e      |            |                      | procedure are in the |
|                      |        |            |                      |  results section.    |
+----------------------+--------+------------+----------------------+----------------------+
| EXTERNAL LAB:        | Routin | 2015 |                      |   Results for this   |
| CHLORIDE             | e      |            |                      | procedure are in the |
|                      |        |            |                      |  results section.    |
+----------------------+--------+------------+----------------------+----------------------+
| EXTERNAL LAB:        | Routin | 2015 |                      |   Results for this   |
| POTASSIUM            | e      |            |                      | procedure are in the |
|                      |        |            |                      |  results section.    |
+----------------------+--------+------------+----------------------+----------------------+
| EXTERNAL LAB: SODIUM | Routin | 2015 |                      |   Results for this   |
|                      | e      |            |                      | procedure are in the |
|                      |        |            |                      |  results section.    |
+----------------------+--------+------------+----------------------+----------------------+
| EXTERNAL LAB: EVY   | Routin | 2015 |                      |   Results for this   |
| INTACT               | e      |            |                      | procedure are in the |
|                      |        |            |                      |  results section.    |
+----------------------+--------+------------+----------------------+----------------------+
| EXTERNAL LAB: CBC    | Routin | 2015 |                      |   Results for this   |
|                      | e      |            |                      | procedure are in the |
|                      |        |            |                      |  results section.    |
+----------------------+--------+------------+----------------------+----------------------+
| EXTERNAL LAB:        | Routin | 2015 |                      |   Results for this   |
| TRIGLYCERIDES        | e      |            |                      | procedure are in the |
|                      |        |            |                      |  results section.    |
+----------------------+--------+------------+----------------------+----------------------+
| EXTERNAL LAB:        | Routin | 2015 |                      |   Results for this   |
| CHOLESTEROL, HDL     | e      |            |                      | procedure are in the |
|                      |        |            |                      |  results section.    |
+----------------------+--------+------------+----------------------+----------------------+
| EXTERNAL LAB:        | Routin | 2015 |                      |   Results for this   |
| CHOLESTEROL, TOTAL   | e      |            |                      | procedure are in the |
|                      |        |            |                      |  results section.    |
+----------------------+--------+------------+----------------------+----------------------+
| EXTERNAL LAB:        | Routin | 2015 |                      |   Results for this   |
| CHOLESTEROL, LDL     | e      |            |                      | procedure are in the |
|                      |        |            |                      |  results section.    |
+----------------------+--------+------------+----------------------+----------------------+
| EXTERNAL LAB: EGFR   | Routin | 2015 |                      |   Results for this   |
|                      | e      |            |                      | procedure are in the |
|                      |        |            |                      |  results section.    |
+----------------------+--------+------------+----------------------+----------------------+
| EXTERNAL LAB:        | Routin | 2015 |                      |   Results for this   |
| CREATININE           | e      |            |                      | procedure are in the |
|                      |        |            |                      |  results section.    |
+----------------------+--------+------------+----------------------+----------------------+
| HEMOGLOBIN A1C       | Routin | 2015 |                      |   Results for this   |
|                      | e      |            |                      | procedure are in the |
|                      |        |            |                      |  results section.    |
+----------------------+--------+------------+----------------------+----------------------+
 documented in this encounter
 
 Results
 External Lab: PTH, Intact (2015)
 
 
+-------------+-------+-----------+------------+--------------+
| Component   | Value | Ref Range | Performed  | Pathologist  |
|             |       |           | At         | Signature    |
+-------------+-------+-----------+------------+--------------+
| PTH Intact, | 187.8 |           |            |              |
|  External   |       |           |            |              |
+-------------+-------+-----------+------------+--------------+
 
 
 
+----------+
| Specimen |
+----------+
|          |
+----------+
 External Lab: Tacrolimus Level, LC-MS/MS (2015)
 
+-------------+-------+-----------+------------+--------------+
| Component   | Value | Ref Range | Performed  | Pathologist  |
|             |       |           | At         | Signature    |
+-------------+-------+-----------+------------+--------------+
| Tacrolimus, | 5.5   |           |            |              |
|  LC-MS/MS,  |       |           |            |              |
| External    |       |           |            |              |
+-------------+-------+-----------+------------+--------------+
 
 
 
+----------+
| Specimen |
+----------+
|          |
+----------+
 Hemoglobin A1C (2015)
 
+-------------+-------+-----------+------------+--------------+
| Component   | Value | Ref Range | Performed  | Pathologist  |
|             |       |           | At         | Signature    |
+-------------+-------+-----------+------------+--------------+
| Hemoglobin  | 7.6   | %         | EXTERNAL   |              |
| A1c         |       |           | LAB        |              |
+-------------+-------+-----------+------------+--------------+
 
 
 
+-----------------+
| Specimen        |
+-----------------+
| Blood specimen  |
| (specimen)      |
+-----------------+
 
 
 
+----------------+---------+--------------------+--------------+
| Performing     | Address | City/State/Zipcode | Phone Number |
| Organization   |         |                    |              |
+----------------+---------+--------------------+--------------+
|   EXTERNAL LAB |         |                    |              |
 
+----------------+---------+--------------------+--------------+
 External Lab: Glucose (2015)
 
+-----------+-------+-----------+------------+--------------+
| Component | Value | Ref Range | Performed  | Pathologist  |
|           |       |           | At         | Signature    |
+-----------+-------+-----------+------------+--------------+
| Glucose,  | 150   |           | EXTERNAL   |              |
| External  |       |           | LAB        |              |
+-----------+-------+-----------+------------+--------------+
 
 
 
+----------------+---------+--------------------+--------------+
| Performing     | Address | City/State/Zipcode | Phone Number |
| Organization   |         |                    |              |
+----------------+---------+--------------------+--------------+
|   EXTERNAL LAB |         |                    |              |
+----------------+---------+--------------------+--------------+
 External Lab: ALT (2015)
 
+-----------+-------+-----------+------------+--------------+
| Component | Value | Ref Range | Performed  | Pathologist  |
|           |       |           | At         | Signature    |
+-----------+-------+-----------+------------+--------------+
| ALT,      | 16    |           | EXTERNAL   |              |
| External  |       |           | LAB        |              |
+-----------+-------+-----------+------------+--------------+
 
 
 
+----------------+---------+--------------------+--------------+
| Performing     | Address | City/State/Zipcode | Phone Number |
| Organization   |         |                    |              |
+----------------+---------+--------------------+--------------+
|   EXTERNAL LAB |         |                    |              |
+----------------+---------+--------------------+--------------+
 External Lab: AST (2015)
 
+-----------+-------+-----------+------------+--------------+
| Component | Value | Ref Range | Performed  | Pathologist  |
|           |       |           | At         | Signature    |
+-----------+-------+-----------+------------+--------------+
| AST,      | 20    |           | EXTERNAL   |              |
| External  |       |           | LAB        |              |
+-----------+-------+-----------+------------+--------------+
 
 
 
+----------------+---------+--------------------+--------------+
| Performing     | Address | City/State/Zipcode | Phone Number |
| Organization   |         |                    |              |
+----------------+---------+--------------------+--------------+
|   EXTERNAL LAB |         |                    |              |
+----------------+---------+--------------------+--------------+
 External Lab: Alkaline Phosphatase (2015)
 
+-----------+-------+-----------+------------+--------------+
| Component | Value | Ref Range | Performed  | Pathologist  |
|           |       |           | At         | Signature    |
 
+-----------+-------+-----------+------------+--------------+
| ALP,      | 91    |           | EXTERNAL   |              |
| External  |       |           | LAB        |              |
+-----------+-------+-----------+------------+--------------+
 
 
 
+----------------+---------+--------------------+--------------+
| Performing     | Address | City/State/Zipcode | Phone Number |
| Organization   |         |                    |              |
+----------------+---------+--------------------+--------------+
|   EXTERNAL LAB |         |                    |              |
+----------------+---------+--------------------+--------------+
 External Lab: Bilirubin, Total (2015)
 
+-------------+-------+-----------+------------+--------------+
| Component   | Value | Ref Range | Performed  | Pathologist  |
|             |       |           | At         | Signature    |
+-------------+-------+-----------+------------+--------------+
| Bilirubin,  | 0.5   |           | EXTERNAL   |              |
| Total,      |       |           | LAB        |              |
| External    |       |           |            |              |
+-------------+-------+-----------+------------+--------------+
 
 
 
+----------------+---------+--------------------+--------------+
| Performing     | Address | City/State/Zipcode | Phone Number |
| Organization   |         |                    |              |
+----------------+---------+--------------------+--------------+
|   EXTERNAL LAB |         |                    |              |
+----------------+---------+--------------------+--------------+
 External Lab: Albumin (2015)
 
+-----------+-------+-----------+------------+--------------+
| Component | Value | Ref Range | Performed  | Pathologist  |
|           |       |           | At         | Signature    |
+-----------+-------+-----------+------------+--------------+
| Albumin,  | 3.9   |           | EXTERNAL   |              |
| External  |       |           | LAB        |              |
+-----------+-------+-----------+------------+--------------+
 
 
 
+----------------+---------+--------------------+--------------+
| Performing     | Address | City/State/Zipcode | Phone Number |
| Organization   |         |                    |              |
+----------------+---------+--------------------+--------------+
|   EXTERNAL LAB |         |                    |              |
+----------------+---------+--------------------+--------------+
 External Lab: Protein, Total (2015)
 
+-----------+-------+-----------+------------+--------------+
| Component | Value | Ref Range | Performed  | Pathologist  |
|           |       |           | At         | Signature    |
+-----------+-------+-----------+------------+--------------+
| Protein,  | 6.1   |           | EXTERNAL   |              |
| Total,    |       |           | LAB        |              |
| External  |       |           |            |              |
+-----------+-------+-----------+------------+--------------+
 
 
 
 
+----------------+---------+--------------------+--------------+
| Performing     | Address | City/State/Zipcode | Phone Number |
| Organization   |         |                    |              |
+----------------+---------+--------------------+--------------+
|   EXTERNAL LAB |         |                    |              |
+----------------+---------+--------------------+--------------+
 External Lab: Phosphorus (2015)
 
+-------------+-------+-----------+------------+--------------+
| Component   | Value | Ref Range | Performed  | Pathologist  |
|             |       |           | At         | Signature    |
+-------------+-------+-----------+------------+--------------+
| Phosphorus, | 2.5   |           | EXTERNAL   |              |
|  External   |       |           | LAB        |              |
+-------------+-------+-----------+------------+--------------+
 
 
 
+----------------+---------+--------------------+--------------+
| Performing     | Address | City/State/Zipcode | Phone Number |
| Organization   |         |                    |              |
+----------------+---------+--------------------+--------------+
|   EXTERNAL LAB |         |                    |              |
+----------------+---------+--------------------+--------------+
 External Lab: Magnesium (2015)
 
+-------------+-------+-----------+------------+--------------+
| Component   | Value | Ref Range | Performed  | Pathologist  |
|             |       |           | At         | Signature    |
+-------------+-------+-----------+------------+--------------+
| Magnesium,  | 1.6   |           | EXTERNAL   |              |
| External    |       |           | LAB        |              |
+-------------+-------+-----------+------------+--------------+
 
 
 
+----------------+---------+--------------------+--------------+
| Performing     | Address | City/State/Zipcode | Phone Number |
| Organization   |         |                    |              |
+----------------+---------+--------------------+--------------+
|   EXTERNAL LAB |         |                    |              |
+----------------+---------+--------------------+--------------+
 External Lab: Calcium (2015)
 
+-----------+-------+-----------+------------+--------------+
| Component | Value | Ref Range | Performed  | Pathologist  |
|           |       |           | At         | Signature    |
+-----------+-------+-----------+------------+--------------+
| Calcium,  | 10.1  |           | EXTERNAL   |              |
| External  |       |           | LAB        |              |
+-----------+-------+-----------+------------+--------------+
 
 
 
+----------------+---------+--------------------+--------------+
| Performing     | Address | City/State/Zipcode | Phone Number |
| Organization   |         |                    |              |
 
+----------------+---------+--------------------+--------------+
|   EXTERNAL LAB |         |                    |              |
+----------------+---------+--------------------+--------------+
 External Lab: Carbon Dioxide (2015)
 
+-----------+-------+-----------+------------+--------------+
| Component | Value | Ref Range | Performed  | Pathologist  |
|           |       |           | At         | Signature    |
+-----------+-------+-----------+------------+--------------+
| Carbon    | 17    |           | EXTERNAL   |              |
| Dioxide,  |       |           | LAB        |              |
| External  |       |           |            |              |
+-----------+-------+-----------+------------+--------------+
 
 
 
+----------------+---------+--------------------+--------------+
| Performing     | Address | City/State/Zipcode | Phone Number |
| Organization   |         |                    |              |
+----------------+---------+--------------------+--------------+
|   EXTERNAL LAB |         |                    |              |
+----------------+---------+--------------------+--------------+
 External Lab: Chloride (2015)
 
+------------+-------+-----------+------------+--------------+
| Component  | Value | Ref Range | Performed  | Pathologist  |
|            |       |           | At         | Signature    |
+------------+-------+-----------+------------+--------------+
| Chloride,  | 112   |           | EXTERNAL   |              |
| External   |       |           | LAB        |              |
+------------+-------+-----------+------------+--------------+
 
 
 
+----------------+---------+--------------------+--------------+
| Performing     | Address | City/State/Zipcode | Phone Number |
| Organization   |         |                    |              |
+----------------+---------+--------------------+--------------+
|   EXTERNAL LAB |         |                    |              |
+----------------+---------+--------------------+--------------+
 External Lab: Potassium (2015)
 
+-------------+-------+-----------+------------+--------------+
| Component   | Value | Ref Range | Performed  | Pathologist  |
|             |       |           | At         | Signature    |
+-------------+-------+-----------+------------+--------------+
| Potassium,  | 5.1   |           | EXTERNAL   |              |
| External    |       |           | LAB        |              |
+-------------+-------+-----------+------------+--------------+
 
 
 
+----------------+---------+--------------------+--------------+
| Performing     | Address | City/State/Zipcode | Phone Number |
| Organization   |         |                    |              |
+----------------+---------+--------------------+--------------+
|   EXTERNAL LAB |         |                    |              |
+----------------+---------+--------------------+--------------+
 External Lab: Sodium (2015)
 
 
+-----------+-------+-----------+------------+--------------+
| Component | Value | Ref Range | Performed  | Pathologist  |
|           |       |           | At         | Signature    |
+-----------+-------+-----------+------------+--------------+
| Sodium,   | 139   |           | EXTERNAL   |              |
| External  |       |           | LAB        |              |
+-----------+-------+-----------+------------+--------------+
 
 
 
+----------------+---------+--------------------+--------------+
| Performing     | Address | City/State/Zipcode | Phone Number |
| Organization   |         |                    |              |
+----------------+---------+--------------------+--------------+
|   EXTERNAL LAB |         |                    |              |
+----------------+---------+--------------------+--------------+
 External Lab: CBC (2015)
 
+-----------+-------+-----------+------------+--------------+
| Component | Value | Ref Range | Performed  | Pathologist  |
|           |       |           | At         | Signature    |
+-----------+-------+-----------+------------+--------------+
| WBC,      | 5.5   |           | EXTERNAL   |              |
| External  |       |           | LAB        |              |
+-----------+-------+-----------+------------+--------------+
| HGB,      | 13.3  |           | EXTERNAL   |              |
| External  |       |           | LAB        |              |
+-----------+-------+-----------+------------+--------------+
| HCT,      | 41.1  |           | EXTERNAL   |              |
| External  |       |           | LAB        |              |
+-----------+-------+-----------+------------+--------------+
| PLT,      | 158   |           | EXTERNAL   |              |
| External  |       |           | LAB        |              |
+-----------+-------+-----------+------------+--------------+
| RBC,      | 3.95  |           | EXTERNAL   |              |
| External  |       |           | LAB        |              |
+-----------+-------+-----------+------------+--------------+
| MCV,      | 104   |           | EXTERNAL   |              |
| External  |       |           | LAB        |              |
+-----------+-------+-----------+------------+--------------+
| RDW,      | 13.8  |           | EXTERNAL   |              |
| External  |       |           | LAB        |              |
+-----------+-------+-----------+------------+--------------+
 
 
 
+----------------+---------+--------------------+--------------+
| Performing     | Address | City/State/Zipcode | Phone Number |
| Organization   |         |                    |              |
+----------------+---------+--------------------+--------------+
|   EXTERNAL LAB |         |                    |              |
+----------------+---------+--------------------+--------------+
 External Lab: Triglycerides (2015)
 
+-------------+-------+-----------+------------+--------------+
| Component   | Value | Ref Range | Performed  | Pathologist  |
|             |       |           | At         | Signature    |
+-------------+-------+-----------+------------+--------------+
| Triglycerid | 232   |           | EXTERNAL   |              |
| es,         |       |           | LAB        |              |
 
| External    |       |           |            |              |
+-------------+-------+-----------+------------+--------------+
 
 
 
+-----------------+
| Specimen        |
+-----------------+
| Blood specimen  |
| (specimen)      |
+-----------------+
 
 
 
+----------------+---------+--------------------+--------------+
| Performing     | Address | City/State/Zipcode | Phone Number |
| Organization   |         |                    |              |
+----------------+---------+--------------------+--------------+
|   EXTERNAL LAB |         |                    |              |
+----------------+---------+--------------------+--------------+
 External Lab: Cholesterol, HDL (2015)
 
+-------------+-------+-----------+------------+--------------+
| Component   | Value | Ref Range | Performed  | Pathologist  |
|             |       |           | At         | Signature    |
+-------------+-------+-----------+------------+--------------+
| HDL         | 56.2  |           | EXTERNAL   |              |
| Cholesterol |       |           | LAB        |              |
| , External  |       |           |            |              |
+-------------+-------+-----------+------------+--------------+
 
 
 
+-----------------+
| Specimen        |
+-----------------+
| Blood specimen  |
| (specimen)      |
+-----------------+
 
 
 
+----------------+---------+--------------------+--------------+
| Performing     | Address | City/State/Zipcode | Phone Number |
| Organization   |         |                    |              |
+----------------+---------+--------------------+--------------+
|   EXTERNAL LAB |         |                    |              |
+----------------+---------+--------------------+--------------+
 External Lab: Cholesterol, Total (2015)
 
+-------------+-------+-----------+------------+--------------+
| Component   | Value | Ref Range | Performed  | Pathologist  |
|             |       |           | At         | Signature    |
+-------------+-------+-----------+------------+--------------+
| Cholesterol | 161   |           | EXTERNAL   |              |
| , Total,    |       |           | LAB        |              |
| External    |       |           |            |              |
+-------------+-------+-----------+------------+--------------+
 
 
 
 
+-----------------+
| Specimen        |
+-----------------+
| Blood specimen  |
| (specimen)      |
+-----------------+
 
 
 
+----------------+---------+--------------------+--------------+
| Performing     | Address | City/State/Zipcode | Phone Number |
| Organization   |         |                    |              |
+----------------+---------+--------------------+--------------+
|   EXTERNAL LAB |         |                    |              |
+----------------+---------+--------------------+--------------+
 External Lab: Cholesterol, LDL (2015)
 
+-------------+-------+-----------+------------+--------------+
| Component   | Value | Ref Range | Performed  | Pathologist  |
|             |       |           | At         | Signature    |
+-------------+-------+-----------+------------+--------------+
| LDL         | 58    |           | EXTERNAL   |              |
| Cholesterol |       |           | LAB        |              |
| , Direct,   |       |           |            |              |
| External    |       |           |            |              |
+-------------+-------+-----------+------------+--------------+
 
 
 
+-----------------+
| Specimen        |
+-----------------+
| Blood specimen  |
| (specimen)      |
+-----------------+
 
 
 
+----------------+---------+--------------------+--------------+
| Performing     | Address | City/State/Zipcode | Phone Number |
| Organization   |         |                    |              |
+----------------+---------+--------------------+--------------+
|   EXTERNAL LAB |         |                    |              |
+----------------+---------+--------------------+--------------+
 External Lab: eGFR (2015)
 
+------------+-------+-----------+------------+--------------+
| Component  | Value | Ref Range | Performed  | Pathologist  |
|            |       |           | At         | Signature    |
+------------+-------+-----------+------------+--------------+
| eGFR,      | 45    |           | EXTERNAL   |              |
| External   |       |           | LAB        |              |
+------------+-------+-----------+------------+--------------+
| eGFR,      |       |           | EXTERNAL   |              |
|     |       |           | LAB        |              |
| [employer],  |       |           |            |              |
| External   |       |           |            |              |
+------------+-------+-----------+------------+--------------+
 
 
 
 
+-----------------+
| Specimen        |
+-----------------+
| Blood specimen  |
| (specimen)      |
+-----------------+
 
 
 
+----------------+---------+--------------------+--------------+
| Performing     | Address | City/State/Zipcode | Phone Number |
| Organization   |         |                    |              |
+----------------+---------+--------------------+--------------+
|   EXTERNAL LAB |         |                    |              |
+----------------+---------+--------------------+--------------+
 External Lab: Creatinine (2015)
 
+-------------+-------+-----------+------------+--------------+
| Component   | Value | Ref Range | Performed  | Pathologist  |
|             |       |           | At         | Signature    |
+-------------+-------+-----------+------------+--------------+
| Creatinine, | 1.2   |           | EXTERNAL   |              |
|  External   |       |           | LAB        |              |
+-------------+-------+-----------+------------+--------------+
 
 
 
+-----------------+
| Specimen        |
+-----------------+
| Blood specimen  |
| (specimen)      |
+-----------------+
 
 
 
+----------------+---------+--------------------+--------------+
| Performing     | Address | City/State/Zipcode | Phone Number |
| Organization   |         |                    |              |
+----------------+---------+--------------------+--------------+
|   EXTERNAL LAB |         |                    |              |
+----------------+---------+--------------------+--------------+
 documented in this encounter
 
 Visit Diagnoses
 Not on filedocumented in this encounter

## 2020-01-08 NOTE — XMS
Encounter Summary
  Created on: 2020
 
 Viviana Ricci
 External Reference #: [id number]
 : 47
 Sex: Female
 
 Demographics
 
 
+-----------------------+----------------------+
| Address               | [address]      |
|                       | [place]  [address] |
+-----------------------+----------------------+
| Home Phone            | [phone]      |
+-----------------------+----------------------+
| Preferred Language    | Unknown              |
+-----------------------+----------------------+
| Marital Status        | Single               |
+-----------------------+----------------------+
| Hinduism Affiliation | 1009                 |
+-----------------------+----------------------+
| Race                  | Unknown              |
+-----------------------+----------------------+
| Ethnic Group          | Unknown              |
+-----------------------+----------------------+
 
 
 Author
 
 
+--------------+--------------------------------------------+
| Author       | [facility]  |
|              | and [place]                                |
+--------------+--------------------------------------------+
| Organization | [facility]  |
|              | and [place]                                |
+--------------+--------------------------------------------+
| Address      | Unknown                                    |
+--------------+--------------------------------------------+
| Phone        | Unavailable                                |
+--------------+--------------------------------------------+
 
 
 
 Support
 
 
+----------------+--------------+---------------------+-----------------+
| Name           | Relationship | Address             | Phone           |
+----------------+--------------+---------------------+-----------------+
| Ada/Ed Radhames | ECON         | [address]              | [phone] |
|                |              | [place]  |                 |
|                |              |  [address]              |                 |
+----------------+--------------+---------------------+-----------------+
 
 
 
 
 Care Team Providers
 
 
+-----------------------+------+-------------+
| Care Team Member Name | Role | Phone       |
+-----------------------+------+-------------+
 PCP  | Unavailable |
+-----------------------+------+-------------+
 
 
 
 Reason for Visit
 
 
+---------+----------+
| Reason  | Comments |
+---------+----------+
| Results |          |
+---------+----------+
 
 
 
 Encounter Details
 
 
+--------+-----------+----------------------+----------------------+-------------+
| Date   | Type      | Department           | Care Team            | Description |
+--------+-----------+----------------------+----------------------+-------------+
| / | Telephone |   [facility] [place]          |   Maricruz Flanagan, | Results     |
|    |           | PULMONARY  [address]     |  RN                  |             |
|        |           | [address]  [place], |                      |             |
|        |           |  [place] [address]       |                      |             |
|        |           | [phone]         |                      |             |
+--------+-----------+----------------------+----------------------+-------------+
 
 
 
 Social History
 
 
+--------------+-------+-----------+--------+------+
| Tobacco Use  | Types | Packs/Day | Years  | Date |
|              |       |           | Used   |      |
+--------------+-------+-----------+--------+------+
| Never Smoker |       |           |        |      |
+--------------+-------+-----------+--------+------+
 
 
 
+---------------------+---+---+---+
| Smokeless Tobacco:  |   |   |   |
| Never Used          |   |   |   |
+---------------------+---+---+---+
 
 
 
+---------------------------------------------------------------+
| Comments: some second hand smoke exposure, but fairly minimal |
+---------------------------------------------------------------+
 
 
 
 
+-------------+-------------+---------+----------+
| Alcohol Use | Drinks/Week | oz/Week | Comments |
+-------------+-------------+---------+----------+
| No          |             |         |          |
+-------------+-------------+---------+----------+
 
 
 
+------------------+---------------+
| Sex Assigned at  | Date Recorded |
| Birth            |               |
+------------------+---------------+
| Not on file      |               |
+------------------+---------------+
 
 
 
+----------------+-------------+-------------+
| Job Start Date | Occupation  | Industry    |
+----------------+-------------+-------------+
| Not on file    | Not on file | Not on file |
+----------------+-------------+-------------+
 
 
 
+----------------+--------------+------------+
| Travel History | Travel Start | Travel End |
+----------------+--------------+------------+
 
 
 
+-------------------------------------+
| No recent travel history available. |
+-------------------------------------+
 documented as of this encounter
 
 Plan of Treatment
 
 
+--------+-----------+------------+----------------------+-------------------+
| Date   | Type      | Specialty  | Care Team            | Description       |
+--------+-----------+------------+----------------------+-------------------+
| / | Office    | Cardiology |   Tonia Wilson,    |                   |
|    | Visit     |            | ARNJESSICA  [address]   |                   |
|        |           |            | St  [place] [facility], [place]  |                   |
|        |           |            | [address]  [phone]  |                   |
|        |           |            |  [phone] (Fax)  |                   |
+--------+-----------+------------+----------------------+-------------------+
| / | Hospital  | Radiology  |   Popeye Townsend, |                   |
|    | Encounter |            |  MD  [address] |                   |
|        |           |            |  [address]  [place],   |                   |
|        |           |            | [place] [address]             |                   |
|        |           |            | [phone]         |                   |
|        |           |            | [phone] (Fax)   |                   |
+--------+-----------+------------+----------------------+-------------------+
| / | Surgery   | Radiology  |   Popeye Townsend, | CV EP PPM SYSTEM  |
|    |           |            |  MD  [address] | IMPLANT           |
 
|        |           |            |  [address]  [place],   |                   |
|        |           |            | [place] [address]             |                   |
|        |           |            | [phone]         |                   |
|        |           |            | [phone] (Fax)   |                   |
+--------+-----------+------------+----------------------+-------------------+
| 02/10/ | Clinical  | Cardiology |                      |                   |
|    | Support   |            |                      |                   |
+--------+-----------+------------+----------------------+-------------------+
| / | Office    | Cardiology |   Tonia Wilson,    |                   |
|    | Visit     |            | BELKIS  [address]   |                   |
|        |           |            | [place]  |                   |
|        |           |            | [address]  [phone]  |                   |
|        |           |            |  [phone] (Fax)  |                   |
+--------+-----------+------------+----------------------+-------------------+
| / | Off-Site  | Nephrology |   Marzena Moser  |                   |
|    | Visit     |            | DO ETIENNE  [address] [place] [address]      |                   |
|        |           |            | [address]      |                   |
|        |           |            | [place]      |                   |
|        |           |            | [address] [phone]  |                   |
|        |           |            |  [phone] (Fax)  |                   |
+--------+-----------+------------+----------------------+-------------------+
 documented as of this encounter
 
 Visit Diagnoses
 Not on filedocumented in this encounter

## 2020-01-08 NOTE — XMS
Encounter Summary
  Created on: 2020
 
 Viviana Ricci
 External Reference #: [id number]
 : 47
 Sex: Female
 
 Demographics
 
 
+-----------------------+----------------------+
| Address               | [address]      |
|                       | [place]  [address] |
+-----------------------+----------------------+
| Home Phone            | [phone]      |
+-----------------------+----------------------+
| Preferred Language    | Unknown              |
+-----------------------+----------------------+
| Marital Status        | Single               |
+-----------------------+----------------------+
| Catholic Affiliation | 1009                 |
+-----------------------+----------------------+
| Race                  | Unknown              |
+-----------------------+----------------------+
| Ethnic Group          | Unknown              |
+-----------------------+----------------------+
 
 
 Author
 
 
+--------------+--------------------------------------------+
| Author       | [facility]  |
|              | and [place]                                |
+--------------+--------------------------------------------+
| Organization | [facility]  |
|              | and [place]                                |
+--------------+--------------------------------------------+
| Address      | Unknown                                    |
+--------------+--------------------------------------------+
| Phone        | Unavailable                                |
+--------------+--------------------------------------------+
 
 
 
 Support
 
 
+----------------+--------------+---------------------+-----------------+
| Name           | Relationship | Address             | Phone           |
+----------------+--------------+---------------------+-----------------+
| Ada/Ed Radhames | ECON         | [address]              | [phone] |
|                |              | [place]  |                 |
|                |              |  [address]              |                 |
+----------------+--------------+---------------------+-----------------+
 
 
 
 
 Care Team Providers
 
 
+-----------------------+------+-------------+
| Care Team Member Name | Role | Phone       |
+-----------------------+------+-------------+
 PCP  | Unavailable |
+-----------------------+------+-------------+
 
 
 
 Reason for Visit
 
 
+--------+----------+
| Reason | Comments |
+--------+----------+
| Other  |          |
+--------+----------+
 
 
 
 Encounter Details
 
 
+--------+-----------+----------------------+----------------------+-------------+
| Date   | Type      | Department           | Care Team            | Description |
+--------+-----------+----------------------+----------------------+-------------+
| / | Telephone |   [facility] [place]          |   Maricruz Flanagan, | Other       |
|    |           | PULMONARY  [address]     |  RN                  |             |
|        |           | [address]  [place], |                      |             |
|        |           |  [place] [address]       |                      |             |
|        |           | [phone]         |                      |             |
+--------+-----------+----------------------+----------------------+-------------+
 
 
 
 Social History
 
 
+--------------+-------+-----------+--------+------+
| Tobacco Use  | Types | Packs/Day | Years  | Date |
|              |       |           | Used   |      |
+--------------+-------+-----------+--------+------+
| Never Smoker |       |           |        |      |
+--------------+-------+-----------+--------+------+
 
 
 
+---------------------+---+---+---+
| Smokeless Tobacco:  |   |   |   |
| Never Used          |   |   |   |
+---------------------+---+---+---+
 
 
 
+---------------------------------------------------------------+
| Comments: some second hand smoke exposure, but fairly minimal |
+---------------------------------------------------------------+
 
 
 
 
+-------------+-------------+---------+----------+
| Alcohol Use | Drinks/Week | oz/Week | Comments |
+-------------+-------------+---------+----------+
| No          |             |         |          |
+-------------+-------------+---------+----------+
 
 
 
+------------------+---------------+
| Sex Assigned at  | Date Recorded |
| Birth            |               |
+------------------+---------------+
| Not on file      |               |
+------------------+---------------+
 
 
 
+----------------+-------------+-------------+
| Job Start Date | Occupation  | Industry    |
+----------------+-------------+-------------+
| Not on file    | Not on file | Not on file |
+----------------+-------------+-------------+
 
 
 
+----------------+--------------+------------+
| Travel History | Travel Start | Travel End |
+----------------+--------------+------------+
 
 
 
+-------------------------------------+
| No recent travel history available. |
+-------------------------------------+
 documented as of this encounter
 
 Plan of Treatment
 
 
+--------+-----------+------------+----------------------+-------------------+
| Date   | Type      | Specialty  | Care Team            | Description       |
+--------+-----------+------------+----------------------+-------------------+
| / | Office    | Cardiology |   Tonia Wilson,    |                   |
|    | Visit     |            | ARNP  [address]   |                   |
|        |           |            | St  [place] [facility], [place]  |                   |
|        |           |            | [address]  [phone]  |                   |
|        |           |            |  [phone] (Fax)  |                   |
+--------+-----------+------------+----------------------+-------------------+
| / | Hospital  | Radiology  |   Popeye Townsend, |                   |
|    | Encounter |            |  MD  [address] |                   |
|        |           |            |  St.  [place],   |                   |
|        |           |            | [place] [address]             |                   |
|        |           |            | [phone]         |                   |
|        |           |            | [phone] (Fax)   |                   |
+--------+-----------+------------+----------------------+-------------------+
| / | Surgery   | Radiology  |   Popeye Townsend, | CV EP PPM SYSTEM  |
|    |           |            |  MD  [address] | IMPLANT           |
 
|        |           |            |  St.  [place],   |                   |
|        |           |            | [place] [address]             |                   |
|        |           |            | [phone]         |                   |
|        |           |            | [phone] (Fax)   |                   |
+--------+-----------+------------+----------------------+-------------------+
| 02/10/ | Clinical  | Cardiology |                      |                   |
|    | Support   |            |                      |                   |
+--------+-----------+------------+----------------------+-------------------+
| / | Office    | Cardiology |   Hellberg, Tonia,    |                   |
|    | Visit     |            | [facility]  [address]   |                   |
|        |           |            | [place]  |                   |
|        |           |            | [address]  [phone]  |                   |
|        |           |            |  [phone] (Fax)  |                   |
+--------+-----------+------------+----------------------+-------------------+
| / | Off-Site  | Nephrology |   Marzena Moser  |                   |
|    | Visit     |            | DO ETIENNE  [address] [place] [address]      |                   |
|        |           |            | [address]      |                   |
|        |           |            | [place]      |                   |
|        |           |            | [address] [phone]  |                   |
|        |           |            |  [phone] (Fax)  |                   |
+--------+-----------+------------+----------------------+-------------------+
 documented as of this encounter
 
 Visit Diagnoses
 Not on filedocumented in this encounter

## 2020-01-08 NOTE — XMS
Encounter Summary
  Created on: 2020
 
 Viviana Ricci
 External Reference #: [id number]
 : 47
 Sex: Female
 
 Demographics
 
 
+-----------------------+----------------------+
| Address               | [address]      |
|                       | [place]  [address] |
+-----------------------+----------------------+
| Home Phone            | [phone]      |
+-----------------------+----------------------+
| Preferred Language    | Unknown              |
+-----------------------+----------------------+
| Marital Status        | Single               |
+-----------------------+----------------------+
| [employer] Affiliation | 1009                 |
+-----------------------+----------------------+
| Race                  | Unknown              |
+-----------------------+----------------------+
| Ethnic Group          | Unknown              |
+-----------------------+----------------------+
 
 
 Author
 
 
+--------------+--------------------------------------------+
| Author       | [facility] [place]  |
|              | and [place]                                |
+--------------+--------------------------------------------+
| Organization | [facility] [place]  |
|              | and [place]                                |
+--------------+--------------------------------------------+
| Address      | Unknown                                    |
+--------------+--------------------------------------------+
| Phone        | Unavailable                                |
+--------------+--------------------------------------------+
 
 
 
 Support
 
 
+----------------+--------------+---------------------+-----------------+
| Name           | Relationship | Address             | Phone           |
+----------------+--------------+---------------------+-----------------+
| Ada/Ed Radhames | ECON         | [address]              | [phone] |
|                |              | [place]  |                 |
|                |              |  [address]              |                 |
+----------------+--------------+---------------------+-----------------+
 
 
 
 
 Care Team Providers
 
 
+-----------------------+------+-------------+
| Care Team Member Name | Role | Phone       |
+-----------------------+------+-------------+
 PCP  | Unavailable |
+-----------------------+------+-------------+
 
 
 
 Encounter Details
 
 
+--------+-------------+---------------------+----------------------+----------------------+
| Date   | Type        | Department          | Care Team            | Description          |
+--------+-------------+---------------------+----------------------+----------------------+
| / | Orders Only |   [facility]         |   Marzena Moser  | Kidney replaced by   |
| 2018   |             | NEPHROLOGY  [address]   | M,   [address]      | transplant (Primary  |
|        |             | [address]   | [address]      | Dx); Type 2 DM with  |
|        |             | [place], [place]     | [place], [place]      | CKD stage 2 and      |
|        |             | [address]          | [address]  [phone]  | hypertension (HCC);  |
|        |             | [phone]        |  [phone] (Fax)  | Mixed hyperlipidemia |
+--------+-------------+---------------------+----------------------+----------------------+
 
 
 
 Social History
 
 
+--------------+-------+-----------+--------+------+
| Tobacco Use  | Types | Packs/Day | Years  | Date |
|              |       |           | Used   |      |
+--------------+-------+-----------+--------+------+
| Never Smoker |       |           |        |      |
+--------------+-------+-----------+--------+------+
 
 
 
+---------------------+---+---+---+
| Smokeless Tobacco:  |   |   |   |
| Never Used          |   |   |   |
+---------------------+---+---+---+
 
 
 
+---------------------------------------------------------------+
| Comments: some second hand smoke exposure, but fairly minimal |
+---------------------------------------------------------------+
 
 
 
+-------------+-------------+---------+----------+
| Alcohol Use | Drinks/Week | oz/Week | Comments |
+-------------+-------------+---------+----------+
| No          |             |         |          |
+-------------+-------------+---------+----------+
 
 
 
 
+------------------+---------------+
| Sex Assigned at  | Date Recorded |
| Birth            |               |
+------------------+---------------+
| Not on file      |               |
+------------------+---------------+
 
 
 
+----------------+-------------+-------------+
| Job Start Date | Occupation  | Industry    |
+----------------+-------------+-------------+
| Not on file    | Not on file | Not on file |
+----------------+-------------+-------------+
 
 
 
+----------------+--------------+------------+
| Travel History | Travel Start | Travel End |
+----------------+--------------+------------+
 
 
 
+-------------------------------------+
| No recent travel history available. |
+-------------------------------------+
 documented as of this encounter
 
 Progress Notes
 Bobbi Hanson RN - 2018 11:50 AM PDTNew standing lab order faxed to Skyler fajardo signed by Bobbi Hanson RN at 2018 11:57 AM PDTdocumented in this encou
nter
 
 Plan of Treatment
 
 
+--------+-----------+------------+----------------------+-------------------+
| Date   | Type      | Specialty  | Care Team            | Description       |
+--------+-----------+------------+----------------------+-------------------+
| / | Office    | Cardiology |   Tonia Wilson,    |                   |
|    | Visit     |            | ARNJESSICA  [address]   |                   |
|        |           |            | St  [place], [place]  |                   |
|        |           |            | [address]  [phone]  |                   |
|        |           |            |  [phone] (Fax)  |                   |
+--------+-----------+------------+----------------------+-------------------+
| / | Hospital  | Radiology  |   Popeye Townsend, |                   |
|    | Encounter |            |  MD  [address] |                   |
|        |           |            |  St.  [place],   |                   |
|        |           |            | [place] [address]             |                   |
|        |           |            | [phone]         |                   |
|        |           |            | [phone] (Fax)   |                   |
+--------+-----------+------------+----------------------+-------------------+
| / | Surgery   | Radiology  |   Popeye Townsend, | CV EP PPM SYSTEM  |
|    |           |            |  MD  [address] | IMPLANT           |
|        |           |            |  St.  [place],   |                   |
|        |           |            | [place] [address]             |                   |
|        |           |            | [phone]         |                   |
|        |           |            | [phone] (Fax)   |                   |
+--------+-----------+------------+----------------------+-------------------+
 
| 02/10/ | Clinical  | Cardiology |                      |                   |
|    | Support   |            |                      |                   |
+--------+-----------+------------+----------------------+-------------------+
| / | Office    | Cardiology |   RakeshmaxxalfredoTonia,    |                   |
|    | Visit     |            | ARNP  [address]   |                   |
|        |           |            | [place]  |                   |
|        |           |            | [address]  [phone]  |                   |
|        |           |            |  [phone] (Fax)  |                   |
+--------+-----------+------------+----------------------+-------------------+
| / | Off-Site  | Nephrology |   Marzena Moser  |                   |
|    | Visit     |            | DO ETIENNE  [address]      |                   |
|        |           |            | [address]      |                   |
|        |           |            | [place]      |                   |
|        |           |            | [address]  [phone]  |                   |
|        |           |            |  [phone] (Fax)  |                   |
+--------+-----------+------------+----------------------+-------------------+
 documented as of this encounter
 
 Visit Diagnoses
 
 
+-----------------------------------------------------+
| Diagnosis                                           |
+-----------------------------------------------------+
|   Kidney replaced by transplant - Primary           |
+-----------------------------------------------------+
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
+-----------------------------------------------------+
|   Mixed hyperlipidemia                              |
+-----------------------------------------------------+
 documented in this encounter

## 2020-01-08 NOTE — XMS
Encounter Summary
  Created on: 2020
 
 Viviana Ricci
 External Reference #: [id number]
 : 47
 Sex: Female
 
 Demographics
 
 
+-----------------------+----------------------+
| Address               | [address]      |
|                       | [place]  [address] |
+-----------------------+----------------------+
| Home Phone            | [phone]      |
+-----------------------+----------------------+
| Preferred Language    | Unknown              |
+-----------------------+----------------------+
| Marital Status        | Single               |
+-----------------------+----------------------+
| [employer] Affiliation | 1009                 |
+-----------------------+----------------------+
| Race                  | Unknown              |
+-----------------------+----------------------+
| Ethnic Group          | Unknown              |
+-----------------------+----------------------+
 
 
 Author
 
 
+--------------+--------------------------------------------+
| Author       | [facility] [place]  |
|              | and [place]                                |
+--------------+--------------------------------------------+
| Organization | [facility] [place]  |
|              | and [place]                                |
+--------------+--------------------------------------------+
| Address      | Unknown                                    |
+--------------+--------------------------------------------+
| Phone        | Unavailable                                |
+--------------+--------------------------------------------+
 
 
 
 Support
 
 
+----------------+--------------+---------------------+-----------------+
| Name           | Relationship | Address             | Phone           |
+----------------+--------------+---------------------+-----------------+
| Ada/Ed Radhames | ECON         | [address]              | [phone] |
|                |              | [place]  |                 |
|                |              |  [address]              |                 |
+----------------+--------------+---------------------+-----------------+
 
 
 
 
 Care Team Providers
 
 
+-----------------------+------+-------------+
| Care Team Member Name | Role | Phone       |
+-----------------------+------+-------------+
 PCP  | Unavailable |
+-----------------------+------+-------------+
 
 
 
 Encounter Details
 
 
+--------+-------------+---------------------+----------------------+---------------------+
| Date   | Type        | Department          | Care Team            | Description         |
+--------+-------------+---------------------+----------------------+---------------------+
| / | Orders Only |   [facility]         |   Marzena Moser  | Kidney transplant   |
| 2018   |             | NEPHROLOGY  [address]   | M, DO  [address]      | status (Primary Dx) |
|        |             | [address]   | [address]      |                     |
|        |             | [place], [place]     | [place], [place]      |                     |
|        |             | [address]          | [address]  [phone]  |                     |
|        |             | [phone]        |  [phone] (Fax)  |                     |
+--------+-------------+---------------------+----------------------+---------------------+
 
 
 
 Social History
 
 
+--------------+-------+-----------+--------+------+
| Tobacco Use  | Types | Packs/Day | Years  | Date |
|              |       |           | Used   |      |
+--------------+-------+-----------+--------+------+
| Never Smoker |       |           |        |      |
+--------------+-------+-----------+--------+------+
 
 
 
+---------------------+---+---+---+
| Smokeless Tobacco:  |   |   |   |
| Never Used          |   |   |   |
+---------------------+---+---+---+
 
 
 
+---------------------------------------------------------------+
| Comments: some second hand smoke exposure, but fairly minimal |
+---------------------------------------------------------------+
 
 
 
+-------------+-------------+---------+----------+
| Alcohol Use | Drinks/Week | oz/Week | Comments |
+-------------+-------------+---------+----------+
| No          |             |         |          |
+-------------+-------------+---------+----------+
 
 
 
 
+------------------+---------------+
| Sex Assigned at  | Date Recorded |
| Birth            |               |
+------------------+---------------+
| Not on file      |               |
+------------------+---------------+
 
 
 
+----------------+-------------+-------------+
| Job Start Date | Occupation  | Industry    |
+----------------+-------------+-------------+
| Not on file    | Not on file | Not on file |
+----------------+-------------+-------------+
 
 
 
+----------------+--------------+------------+
| Travel History | Travel Start | Travel End |
+----------------+--------------+------------+
 
 
 
+-------------------------------------+
| No recent travel history available. |
+-------------------------------------+
 documented as of this encounter
 
 Plan of Treatment
 
 
+--------+-----------+------------+----------------------+-------------------+
| Date   | Type      | Specialty  | Care Team            | Description       |
+--------+-----------+------------+----------------------+-------------------+
| / | Office    | Cardiology |   Tonia Wilson,    |                   |
|    | Visit     |            | ARNJESSICA  [address]   |                   |
|        |           |            | St  [place] [facility], [place]  |                   |
|        |           |            | [address]  [phone]  |                   |
|        |           |            |  [phone] (Fax)  |                   |
+--------+-----------+------------+----------------------+-------------------+
| / | Hospital  | Radiology  |   Popeye Townsend, |                   |
|    | Encounter |            |  MD  [address] |                   |
|        |           |            |  StNikkie  [place],   |                   |
|        |           |            | [place] [address]             |                   |
|        |           |            | [phone]         |                   |
|        |           |            | [phone] (Fax)   |                   |
+--------+-----------+------------+----------------------+-------------------+
| / | Surgery   | Radiology  |   Popeye Townsend, | CV EP PPM SYSTEM  |
|    |           |            |  MD  [address] | IMPLANT           |
|        |           |            |  [place]Nikkie  [place],   |                   |
|        |           |            | [place] [address]             |                   |
|        |           |            | [phone]         |                   |
|        |           |            | [phone] (Fax)   |                   |
+--------+-----------+------------+----------------------+-------------------+
| 02/10/ | Clinical  | Cardiology |                      |                   |
|    | Support   |            |                      |                   |
+--------+-----------+------------+----------------------+-------------------+
| / | Office    | Cardiology |   Tonia Wilson,    |                   |
|    | Visit     |            | [facility]  [address]   |                   |
 
|        |           |            |   [place]  |                   |
|        |           |            | [address]  [phone]  |                   |
|        |           |            |  [phone] (Fax)  |                   |
+--------+-----------+------------+----------------------+-------------------+
| / | Off-Site  | Nephrology |   Marzena Moser  |                   |
|    | Visit     |            | DO ETIENNE  [address] [place] [address]      |                   |
|        |           |            | [address]      |                   |
|        |           |            | [place]      |                   |
|        |           |            | [address]  [phone]  |                   |
|        |           |            |  [phone] (Fax)  |                   |
+--------+-----------+------------+----------------------+-------------------+
 documented as of this encounter
 
 Visit Diagnoses
 
 
+--------------------------------------+
| Diagnosis                            |
+--------------------------------------+
|   Kidney transplant status - Primary |
+--------------------------------------+
 documented in this encounter

## 2020-01-08 NOTE — XMS
Encounter Summary
  Created on: 2020
 
 Viviana Ricci
 External Reference #: [id number]
 : 47
 Sex: Female
 
 Demographics
 
 
+-----------------------+----------------------+
| Address               | [address]      |
|                       | [place]  [address] |
+-----------------------+----------------------+
| Home Phone            | [phone]      |
+-----------------------+----------------------+
| Preferred Language    | Unknown              |
+-----------------------+----------------------+
| Marital Status        | Single               |
+-----------------------+----------------------+
| Hoahaoism Affiliation | [id number]                 |
+-----------------------+----------------------+
| Race                  | Unknown              |
+-----------------------+----------------------+
| Ethnic Group          | Unknown              |
+-----------------------+----------------------+
 
 
 Author
 
 
+--------------+--------------------------------------------+
| Author       | [facility]  |
|              | and [facility]                                |
+--------------+--------------------------------------------+
| Organization | [facility]  |
|              | and [facility]                                |
+--------------+--------------------------------------------+
| Address      | Unknown                                    |
+--------------+--------------------------------------------+
| Phone        | Unavailable                                |
+--------------+--------------------------------------------+
 
 
 
 Support
 
 
+----------------+--------------+---------------------+-----------------+
| Name           | Relationship | Address             | Phone           |
+----------------+--------------+---------------------+-----------------+
| Ada/Ed Radhames | ECON         | [address]              | [phone] |
|                |              | [place]  |                 |
|                |              |  [address]              |                 |
+----------------+--------------+---------------------+-----------------+
 
 
 
 
 Care Team Providers
 
 
+-----------------------+------+-------------+
| Care Team Member Name | Role | Phone       |
+-----------------------+------+-------------+
 PCP  | Unavailable |
+-----------------------+------+-------------+
 
 
 
 Encounter Details
 
 
+--------+----------+---------------------+----------------------+-------------+
| Date   | Type     | Department          | Care Team            | Description |
+--------+----------+---------------------+----------------------+-------------+
| / | Abstract |   [facility] [place]         |   Marzena Moser  |             |
|    |          | NEPHROLOGY  [address]   | M, DO  [address]      |             |
|        |          | [address]   | [address]      |             |
|        |          | [place], [place]     | [place]      |             |
|        |          | [address]          | [address]  [phone]  |             |
|        |          | [phone]        |  [phone] (Fax)  |             |
+--------+----------+---------------------+----------------------+-------------+
 
 
 
 Social History
 
 
+--------------+-------+-----------+--------+------+
| Tobacco Use  | Types | Packs/Day | Years  | Date |
|              |       |           | Used   |      |
+--------------+-------+-----------+--------+------+
| Never Smoker |       |           |        |      |
+--------------+-------+-----------+--------+------+
 
 
 
+---------------------+---+---+---+
| Smokeless Tobacco:  |   |   |   |
| Never Used          |   |   |   |
+---------------------+---+---+---+
 
 
 
+---------------------------------------------------------------+
| Comments: some second hand smoke exposure, but fairly minimal |
+---------------------------------------------------------------+
 
 
 
+-------------+-------------+---------+----------+
| Alcohol Use | Drinks/Week | oz/Week | Comments |
+-------------+-------------+---------+----------+
| No          |             |         |          |
+-------------+-------------+---------+----------+
 
 
 
 
+------------------+---------------+
| Sex Assigned at  | Date Recorded |
| Birth            |               |
+------------------+---------------+
| Not on file      |               |
+------------------+---------------+
 
 
 
+----------------+-------------+-------------+
| Job Start Date | Occupation  | Industry    |
+----------------+-------------+-------------+
| Not on file    | Not on file | Not on file |
+----------------+-------------+-------------+
 
 
 
+----------------+--------------+------------+
| Travel History | Travel Start | Travel End |
+----------------+--------------+------------+
 
 
 
+-------------------------------------+
| No recent travel history available. |
+-------------------------------------+
 documented as of this encounter
 
 Plan of Treatment
 
 
+--------+-----------+------------+----------------------+-------------------+
| Date   | Type      | Specialty  | Care Team            | Description       |
+--------+-----------+------------+----------------------+-------------------+
| / | Office    | Cardiology |   Tonia Wilson,    |                   |
|    | Visit     |            | ARNJESSICA  [address]   |                   |
|        |           |            | [place]  |                   |
|        |           |            | [address]  [phone]  |                   |
|        |           |            |  [phone] (Fax)  |                   |
+--------+-----------+------------+----------------------+-------------------+
| / | Hospital  | Radiology  |   Popeye Townsend, |                   |
|    | Encounter |            |  MD  [address] |                   |
|        |           |            |  St.  [place],   |                   |
|        |           |            | [place] [address]             |                   |
|        |           |            | [phone]         |                   |
|        |           |            | [phone] (Fax)   |                   |
+--------+-----------+------------+----------------------+-------------------+
| / | Surgery   | Radiology  |   Popeye Townsend, | CV EP PPM SYSTEM  |
|    |           |            |  MD  [address] | IMPLANT           |
|        |           |            |  St.  [place],   |                   |
|        |           |            | [place] [address]             |                   |
|        |           |            | [phone]         |                   |
|        |           |            | [phone] (Fax)   |                   |
+--------+-----------+------------+----------------------+-------------------+
| 02/10/ | Clinical  | Cardiology |                      |                   |
|    | Support   |            |                      |                   |
+--------+-----------+------------+----------------------+-------------------+
| / | Office    | Cardiology |   Tonia Wilson,    |                   |
|    | Visit     |            | ARNP  [address]   |                   |
 
|        |           |            | St  [place], [place]  |                   |
|        |           |            | [address]  [phone]  |                   |
|        |           |            |  [phone] (Fax)  |                   |
+--------+-----------+------------+----------------------+-------------------+
| / | Off-Site  | Nephrology |   Marzena Moser  |                   |
|    | Visit     |            | DO ETIENNE  [address] [place] [address]      |                   |
|        |           |            | [address]      |                   |
|        |           |            | [place]      |                   |
|        |           |            | [address]  [phone]  |                   |
|        |           |            |  [phone] (Fax)  |                   |
+--------+-----------+------------+----------------------+-------------------+
 documented as of this encounter
 
 Procedures
 
 
+----------------------+--------+------------+----------------------+----------------------+
| Procedure Name       | Priori | Date/Time  | Associated Diagnosis | Comments             |
|                      | ty     |            |                      |                      |
+----------------------+--------+------------+----------------------+----------------------+
| EXTERNAL LAB: BILLY    | Routin | 2014 |                      |   Results for this   |
|                      | e      |            |                      | procedure are in the |
|                      |        |            |                      |  results section.    |
+----------------------+--------+------------+----------------------+----------------------+
| EXTERNAL LAB: AST    | Routin | 2014 |                      |   Results for this   |
|                      | e      |            |                      | procedure are in the |
|                      |        |            |                      |  results section.    |
+----------------------+--------+------------+----------------------+----------------------+
| EXTERNAL LAB: ALT    | Routin | 2014 |                      |   Results for this   |
|                      | e      |            |                      | procedure are in the |
|                      |        |            |                      |  results section.    |
+----------------------+--------+------------+----------------------+----------------------+
| EXTERNAL LAB:        | Routin | 2014 |                      |   Results for this   |
| TRIGLYCERIDES        | e      |            |                      | procedure are in the |
|                      |        |            |                      |  results section.    |
+----------------------+--------+------------+----------------------+----------------------+
| EXTERNAL LAB:        | Routin | 2014 |                      |   Results for this   |
| CHOLESTEROL, HDL     | e      |            |                      | procedure are in the |
|                      |        |            |                      |  results section.    |
+----------------------+--------+------------+----------------------+----------------------+
| EXTERNAL LAB:        | Routin | 2014 |                      |   Results for this   |
| CHOLESTEROL, TOTAL   | e      |            |                      | procedure are in the |
|                      |        |            |                      |  results section.    |
+----------------------+--------+------------+----------------------+----------------------+
| EXTERNAL LAB:        | Routin | 2014 |                      |   Results for this   |
| CHOLESTEROL, LDL     | e      |            |                      | procedure are in the |
|                      |        |            |                      |  results section.    |
+----------------------+--------+------------+----------------------+----------------------+
| EXTERNAL LAB: EGFR   | Routin | 2014 |                      |   Results for this   |
|                      | e      |            |                      | procedure are in the |
|                      |        |            |                      |  results section.    |
+----------------------+--------+------------+----------------------+----------------------+
| EXTERNAL LAB:        | Routin | 2014 |                      |   Results for this   |
| CREATININE           | e      |            |                      | procedure are in the |
|                      |        |            |                      |  results section.    |
+----------------------+--------+------------+----------------------+----------------------+
| EXTERNAL LAB:        | Routin | 2014 |                      |   Results for this   |
| HEMOGLOBIN A1C       | e      |            |                      | procedure are in the |
|                      |        |            |                      |  results section.    |
+----------------------+--------+------------+----------------------+----------------------+
 
| CMP14+LP+CBC/D/PLT+T | Routin | 2014 |                      |   Results for this   |
| SH+UA/M (NON ORD)    | e      |            |                      | procedure are in the |
|                      |        |            |                      |  results section.    |
+----------------------+--------+------------+----------------------+----------------------+
 documented in this encounter
 
 Results
 External Lab: Hemoglobin A1c (2014)
 
+-------------+-------+-----------+------------+--------------+
| Component   | Value | Ref Range | Performed  | Pathologist  |
|             |       |           | At         | Signature    |
+-------------+-------+-----------+------------+--------------+
| Hemoglobin  | 7.4   |           | EXTERNAL   |              |
| A1c,        |       |           | LAB        |              |
| external    |       |           |            |              |
+-------------+-------+-----------+------------+--------------+
 
 
 
+-----------------+
| Specimen        |
+-----------------+
| Blood specimen  |
| (specimen)      |
+-----------------+
 
 
 
+--------------------------+
| Resulting Agency Comment |
+--------------------------+
|   [facility]              |
+--------------------------+
 
 
 
+----------------+---------+--------------------+--------------+
| Performing     | Address | City/State/Zipcode | Phone Number |
| Organization   |         |                    |              |
+----------------+---------+--------------------+--------------+
|   EXTERNAL LAB |         |                    |              |
+----------------+---------+--------------------+--------------+
 CMP14+LP+CBC/D/Plt+TSH+UA/M (2014)
 
+-------------+-------+-----------------+------------+--------------+
| Component   | Value | Ref Range       | Performed  | Pathologist  |
|             |       |                 | At         | Signature    |
+-------------+-------+-----------------+------------+--------------+
| Na          | 138   | mmol/L          |            |              |
+-------------+-------+-----------------+------------+--------------+
| K           | 5.0   | mmol/L          |            |              |
+-------------+-------+-----------------+------------+--------------+
| Cl          | 108   | mmol/L          |            |              |
+-------------+-------+-----------------+------------+--------------+
| CO2         | 21    | mmol/L          |            |              |
+-------------+-------+-----------------+------------+--------------+
| BUN         | 44    | mg/dL           |            |              |
+-------------+-------+-----------------+------------+--------------+
| Glucose     | 100   | mg/dL           |            |              |
 
+-------------+-------+-----------------+------------+--------------+
| Calcium     | 10.2  | mg/dL           |            |              |
+-------------+-------+-----------------+------------+--------------+
| PTH INTACT  | 196   | pg/mL           |            |              |
+-------------+-------+-----------------+------------+--------------+
| Phosphorus  | 3.1   | 2.6 - 4.4 mg/dL |            |              |
+-------------+-------+-----------------+------------+--------------+
| Magnesium   | 1.9   | mg/dL           |            |              |
+-------------+-------+-----------------+------------+--------------+
| MCV         | 103.9 | fL              |            |              |
+-------------+-------+-----------------+------------+--------------+
| Bilirubin   | 0.5   | mg/dL           |            |              |
| Total       |       |                 |            |              |
+-------------+-------+-----------------+------------+--------------+
| Alkaline    | 102   | U/L             |            |              |
| Phosphatase |       |                 |            |              |
+-------------+-------+-----------------+------------+--------------+
| Albumin     | 3.9   | 3.3 - 4.8 g/dL  |            |              |
+-------------+-------+-----------------+------------+--------------+
| Tacrolimus  | 6.0   |                 |            |              |
| Level       |       |                 |            |              |
+-------------+-------+-----------------+------------+--------------+
 
 
 
+----------+
| Specimen |
+----------+
|          |
+----------+
 External Lab: CBC (2014)
 
+-----------+-------+-----------+------------+--------------+
| Component | Value | Ref Range | Performed  | Pathologist  |
|           |       |           | At         | Signature    |
+-----------+-------+-----------+------------+--------------+
| WBC,      | 6.8   |           | EXTERNAL   |              |
| External  |       |           | LAB        |              |
+-----------+-------+-----------+------------+--------------+
| HGB,      | 13.3  |           | EXTERNAL   |              |
| External  |       |           | LAB        |              |
+-----------+-------+-----------+------------+--------------+
| HCT,      | 39.6  |           | EXTERNAL   |              |
| External  |       |           | LAB        |              |
+-----------+-------+-----------+------------+--------------+
| PLT,      | 172   |           | EXTERNAL   |              |
| External  |       |           | LAB        |              |
+-----------+-------+-----------+------------+--------------+
 
 
 
+--------------------------+
| Resulting Agency Comment |
+--------------------------+
|   [facility]              |
+--------------------------+
 
 
 
+----------------+---------+--------------------+--------------+
 
| Performing     | Address | City/State/Zipcode | Phone Number |
| Organization   |         |                    |              |
+----------------+---------+--------------------+--------------+
|   EXTERNAL LAB |         |                    |              |
+----------------+---------+--------------------+--------------+
 External Lab: AST (2014)
 
+-----------+-------+-----------+------------+--------------+
| Component | Value | Ref Range | Performed  | Pathologist  |
|           |       |           | At         | Signature    |
+-----------+-------+-----------+------------+--------------+
| AST,      | 23    |           | EXTERNAL   |              |
| External  |       |           | LAB        |              |
+-----------+-------+-----------+------------+--------------+
 
 
 
+-----------------+
| Specimen        |
+-----------------+
| Blood specimen  |
| (specimen)      |
+-----------------+
 
 
 
+--------------------------+
| Resulting Agency Comment |
+--------------------------+
|   [facility]              |
+--------------------------+
 
 
 
+----------------+---------+--------------------+--------------+
| Performing     | Address | City/State/Zipcode | Phone Number |
| Organization   |         |                    |              |
+----------------+---------+--------------------+--------------+
|   EXTERNAL LAB |         |                    |              |
+----------------+---------+--------------------+--------------+
 External Lab: ALT (2014)
 
+-----------+-------+-----------+------------+--------------+
| Component | Value | Ref Range | Performed  | Pathologist  |
|           |       |           | At         | Signature    |
+-----------+-------+-----------+------------+--------------+
| ALT,      | 16    |           | EXTERNAL   |              |
| External  |       |           | LAB        |              |
+-----------+-------+-----------+------------+--------------+
 
 
 
+-----------------+
| Specimen        |
+-----------------+
| Blood specimen  |
| (specimen)      |
+-----------------+
 
 
 
 
+--------------------------+
| Resulting Agency Comment |
+--------------------------+
|   [facility]              |
+--------------------------+
 
 
 
+----------------+---------+--------------------+--------------+
| Performing     | Address | City/State/Zipcode | Phone Number |
| Organization   |         |                    |              |
+----------------+---------+--------------------+--------------+
|   EXTERNAL LAB |         |                    |              |
+----------------+---------+--------------------+--------------+
 External Lab: Triglycerides (2014)
 
+-------------+-------+-----------+------------+--------------+
| Component   | Value | Ref Range | Performed  | Pathologist  |
|             |       |           | At         | Signature    |
+-------------+-------+-----------+------------+--------------+
| Triglycerid | 197   |           | EXTERNAL   |              |
| es,         |       |           | LAB        |              |
| External    |       |           |            |              |
+-------------+-------+-----------+------------+--------------+
 
 
 
+-----------------+
| Specimen        |
+-----------------+
| Blood specimen  |
| (specimen)      |
+-----------------+
 
 
 
+--------------------------+
| Resulting Agency Comment |
+--------------------------+
|   [facility]              |
+--------------------------+
 
 
 
+----------------+---------+--------------------+--------------+
| Performing     | Address | City/State/Zipcode | Phone Number |
| Organization   |         |                    |              |
+----------------+---------+--------------------+--------------+
|   EXTERNAL LAB |         |                    |              |
+----------------+---------+--------------------+--------------+
 External Lab: Cholesterol, HDL (2014)
 
+-------------+-------+-----------+------------+--------------+
| Component   | Value | Ref Range | Performed  | Pathologist  |
|             |       |           | At         | Signature    |
+-------------+-------+-----------+------------+--------------+
| HDL         | 50.2  |           | EXTERNAL   |              |
| Cholesterol |       |           | LAB        |              |
| , External  |       |           |            |              |
 
+-------------+-------+-----------+------------+--------------+
 
 
 
+-----------------+
| Specimen        |
+-----------------+
| Blood specimen  |
| (specimen)      |
+-----------------+
 
 
 
+--------------------------+
| Resulting Agency Comment |
+--------------------------+
|   [facility]              |
+--------------------------+
 
 
 
+----------------+---------+--------------------+--------------+
| Performing     | Address | City/State/Zipcode | Phone Number |
| Organization   |         |                    |              |
+----------------+---------+--------------------+--------------+
|   EXTERNAL LAB |         |                    |              |
+----------------+---------+--------------------+--------------+
 External Lab: Cholesterol, Total (2014)
 
+-------------+-------+-----------+------------+--------------+
| Component   | Value | Ref Range | Performed  | Pathologist  |
|             |       |           | At         | Signature    |
+-------------+-------+-----------+------------+--------------+
| Cholesterol | 155   |           | EXTERNAL   |              |
| , Total,    |       |           | LAB        |              |
| External    |       |           |            |              |
+-------------+-------+-----------+------------+--------------+
 
 
 
+-----------------+
| Specimen        |
+-----------------+
| Blood specimen  |
| (specimen)      |
+-----------------+
 
 
 
+--------------------------+
| Resulting Agency Comment |
+--------------------------+
|   [facility]              |
+--------------------------+
 
 
 
+----------------+---------+--------------------+--------------+
| Performing     | Address | City/State/Zipcode | Phone Number |
| Organization   |         |                    |              |
 
+----------------+---------+--------------------+--------------+
|   EXTERNAL LAB |         |                    |              |
+----------------+---------+--------------------+--------------+
 External Lab: Cholesterol, LDL (2014)
 
+-------------+-------+-----------+------------+--------------+
| Component   | Value | Ref Range | Performed  | Pathologist  |
|             |       |           | At         | Signature    |
+-------------+-------+-----------+------------+--------------+
| LDL         | 65    |           | EXTERNAL   |              |
| Cholesterol |       |           | LAB        |              |
| , Direct,   |       |           |            |              |
| External    |       |           |            |              |
+-------------+-------+-----------+------------+--------------+
 
 
 
+-----------------+
| Specimen        |
+-----------------+
| Blood specimen  |
| (specimen)      |
+-----------------+
 
 
 
+--------------------------+
| Resulting Agency Comment |
+--------------------------+
|   [facility]              |
+--------------------------+
 
 
 
+----------------+---------+--------------------+--------------+
| Performing     | Address | City/State/Zipcode | Phone Number |
| Organization   |         |                    |              |
+----------------+---------+--------------------+--------------+
|   EXTERNAL LAB |         |                    |              |
+----------------+---------+--------------------+--------------+
 External Lab: eGFR (2014)
 
+------------+-------+-----------+------------+--------------+
| Component  | Value | Ref Range | Performed  | Pathologist  |
|            |       |           | At         | Signature    |
+------------+-------+-----------+------------+--------------+
| eGFR,      | 37    |           | EXTERNAL   |              |
| External   |       |           | LAB        |              |
+------------+-------+-----------+------------+--------------+
| eGFR,      |       |           | EXTERNAL   |              |
|     |       |           | LAB        |              |
| [facility],  |       |           |            |              |
| External   |       |           |            |              |
+------------+-------+-----------+------------+--------------+
 
 
 
+-----------------+
| Specimen        |
+-----------------+
 
| Blood specimen  |
| (specimen)      |
+-----------------+
 
 
 
+--------------------------+
| Resulting Agency Comment |
+--------------------------+
|   [facility]              |
+--------------------------+
 
 
 
+----------------+---------+--------------------+--------------+
| Performing     | Address | City/State/Zipcode | Phone Number |
| Organization   |         |                    |              |
+----------------+---------+--------------------+--------------+
|   EXTERNAL LAB |         |                    |              |
+----------------+---------+--------------------+--------------+
 External Lab: Creatinine (2014)
 
+-------------+-------+-----------+------------+--------------+
| Component   | Value | Ref Range | Performed  | Pathologist  |
|             |       |           | At         | Signature    |
+-------------+-------+-----------+------------+--------------+
| Creatinine, | 1.43  |           | EXTERNAL   |              |
|  External   |       |           | LAB        |              |
+-------------+-------+-----------+------------+--------------+
 
 
 
+-----------------+
| Specimen        |
+-----------------+
| Blood specimen  |
| (specimen)      |
+-----------------+
 
 
 
+--------------------------+
| Resulting Agency Comment |
+--------------------------+
|   [facility]              |
+--------------------------+
 
 
 
+----------------+---------+--------------------+--------------+
| Performing     | Address | City/State/Zipcode | Phone Number |
| Organization   |         |                    |              |
+----------------+---------+--------------------+--------------+
|   EXTERNAL LAB |         |                    |              |
+----------------+---------+--------------------+--------------+
 documented in this encounter
 
 Visit Diagnoses
 Not on filedocumented in this encounter

## 2020-01-08 NOTE — XMS
Encounter Summary
  Created on: 2020
 
 Viviana Ricci
 External Reference #: [id number]
 : 47
 Sex: Female
 
 Demographics
 
 
+-----------------------+----------------------+
| Address               | [address]      |
|                       | [place]  [address] |
+-----------------------+----------------------+
| Home Phone            | [phone]      |
+-----------------------+----------------------+
| Preferred Language    | Unknown              |
+-----------------------+----------------------+
| Marital Status        | Single               |
+-----------------------+----------------------+
| Mandaen Affiliation | [id number]                 |
+-----------------------+----------------------+
| Race                  | Unknown              |
+-----------------------+----------------------+
| Ethnic Group          | Unknown              |
+-----------------------+----------------------+
 
 
 Author
 
 
+--------------+--------------------------------------------+
| Author       | [facility]  |
|              | and [place]                                |
+--------------+--------------------------------------------+
| Organization | [facility]  |
|              | and [place]                                |
+--------------+--------------------------------------------+
| Address      | Unknown                                    |
+--------------+--------------------------------------------+
| Phone        | Unavailable                                |
+--------------+--------------------------------------------+
 
 
 
 Support
 
 
+----------------+--------------+---------------------+-----------------+
| Name           | Relationship | Address             | Phone           |
+----------------+--------------+---------------------+-----------------+
| Ada/Ed Radhames | ECON         | [address]              | [phone] |
|                |              | [place]  |                 |
|                |              |  [address]              |                 |
+----------------+--------------+---------------------+-----------------+
 
 
 
 
 Care Team Providers
 
 
+-----------------------+------+-------------+
| Care Team Member Name | Role | Phone       |
+-----------------------+------+-------------+
 PCP  | Unavailable |
+-----------------------+------+-------------+
 
 
 
 Encounter Details
 
 
+--------+-----------+----------------------+----------------------+-------------+
| Date   | Type      | Department           | Care Team            | Description |
+--------+-----------+----------------------+----------------------+-------------+
| 03/15/ | [facility]  |   [facility] |   Clint Maxwell   |             |
|  - | Encounter |  MED CTR MED ONC     | MD MONSERRAT  [address] [facility] |             |
|        |           | [address]  |   [place]    |             |
| / |           | [place] [address] | [address]  [phone]  |             |
|    |           |   [phone]       |  [phone] (Fax)  |             |
+--------+-----------+----------------------+----------------------+-------------+
 
 
 
 Social History
 
 
+----------------+-------+-----------+--------+------+
| Tobacco Use    | Types | Packs/Day | Years  | Date |
|                |       |           | Used   |      |
+----------------+-------+-----------+--------+------+
| Never Assessed |       |           |        |      |
+----------------+-------+-----------+--------+------+
 
 
 
+------------------+---------------+
| Sex Assigned at  | Date Recorded |
| Birth            |               |
+------------------+---------------+
| Not on file      |               |
+------------------+---------------+
 
 
 
+----------------+-------------+-------------+
| Job Start Date | Occupation  | Industry    |
+----------------+-------------+-------------+
| Not on file    | Not on file | Not on file |
+----------------+-------------+-------------+
 
 
 
+----------------+--------------+------------+
| Travel History | Travel Start | Travel End |
+----------------+--------------+------------+
 
 
 
 
+-------------------------------------+
| No recent travel history available. |
+-------------------------------------+
 documented as of this encounter
 
 Plan of Treatment
 
 
+--------+-----------+------------+----------------------+-------------------+
| Date   | Type      | Specialty  | Care Team            | Description       |
+--------+-----------+------------+----------------------+-------------------+
| / | Office    | Cardiology |   Tonia Wilson,    |                   |
|    | Visit     |            | ARNP  [address]   |                   |
|        |           |            | St  [place], [place]  |                   |
|        |           |            | [address]  [phone]  |                   |
|        |           |            |  [phone] (Fax)  |                   |
+--------+-----------+------------+----------------------+-------------------+
| / | Hospital  | Radiology  |   Popeye Townsend, |                   |
|    | Encounter |            |  MD  [address] |                   |
|        |           |            |  [place],   |                   |
|        |           |            | [place] [address]             |                   |
|        |           |            | [phone]         |                   |
|        |           |            | [phone] (Fax)   |                   |
+--------+-----------+------------+----------------------+-------------------+
| / | Surgery   | Radiology  |   Popeye Townsend, | CV EP PPM SYSTEM  |
|    |           |            |  MD  [address] | IMPLANT           |
|        |           |            |  [place],   |                   |
|        |           |            | [place] [address]             |                   |
|        |           |            | [phone]         |                   |
|        |           |            | [phone] (Fax)   |                   |
+--------+-----------+------------+----------------------+-------------------+
| 02/10/ | Clinical  | Cardiology |                      |                   |
|    | Support   |            |                      |                   |
+--------+-----------+------------+----------------------+-------------------+
| / | Office    | Cardiology |   Tonia Wilson,    |                   |
|    | Visit     |            | ARNP  [address]   |                   |
|        |           |            | [place]  |                   |
|        |           |            | [address]  [phone]  |                   |
|        |           |            |  [phone] (Fax)  |                   |
+--------+-----------+------------+----------------------+-------------------+
| / | Off-Site  | Nephrology |   Marzena Moser  |                   |
|    | Visit     |            | DO ETIENNE  [address] [place] [address]      |                   |
|        |           |            | [address]      |                   |
|        |           |            | [place]      |                   |
|        |           |            | [address] [phone]  |                   |
|        |           |            |  [phone] (Fax)  |                   |
+--------+-----------+------------+----------------------+-------------------+
 documented as of this encounter
 
 Visit Diagnoses
 Not on filedocumented in this encounter

## 2020-01-08 NOTE — XMS
Encounter Summary
  Created on: 2020
 
 Viviana Ricci
 External Reference #: [id number]
 : 47
 Sex: Female
 
 Demographics
 
 
+-----------------------+----------------------+
| Address               | [address]      |
|                       | [place]  [address] |
+-----------------------+----------------------+
| Home Phone            | [phone]      |
+-----------------------+----------------------+
| Preferred Language    | Unknown              |
+-----------------------+----------------------+
| Marital Status        | Single               |
+-----------------------+----------------------+
| Holiness Affiliation | 1009                 |
+-----------------------+----------------------+
| Race                  | Unknown              |
+-----------------------+----------------------+
| Ethnic Group          | Unknown              |
+-----------------------+----------------------+
 
 
 Author
 
 
+--------------+--------------------------------------------+
| Author       | [facility]  |
|              | and [place]                                |
+--------------+--------------------------------------------+
| Organization | [facility]  |
|              | and [place]                                |
+--------------+--------------------------------------------+
| Address      | Unknown                                    |
+--------------+--------------------------------------------+
| Phone        | Unavailable                                |
+--------------+--------------------------------------------+
 
 
 
 Support
 
 
+----------------+--------------+---------------------+-----------------+
| Name           | Relationship | Address             | Phone           |
+----------------+--------------+---------------------+-----------------+
| Ada/Ed Radhames | ECON         | [address]              | [phone] |
|                |              | [place]  |                 |
|                |              |  [address]              |                 |
+----------------+--------------+---------------------+-----------------+
 
 
 
 
 Care Team Providers
 
 
+-----------------------+------+-------------+
| Care Team Member Name | Role | Phone       |
+-----------------------+------+-------------+
 PCP  | Unavailable |
+-----------------------+------+-------------+
 
 
 
 Reason for Visit
 
 
+-------------------+----------+
| Reason            | Comments |
+-------------------+----------+
| Medication Refill |          |
+-------------------+----------+
 
 
 
 Encounter Details
 
 
+--------+--------+---------------------+----------------------+-------------------+
| Date   | Type   | Department          | Care Team            | Description       |
+--------+--------+---------------------+----------------------+-------------------+
| / | Refill |   [facility]         |   Marzena Moser  | Medication Refill |
|    |        | NEPHROLOGY  [address]   | M, DO  [address]      |                   |
|        |        | [address]   | [address]      |                   |
|        |        | [place], [place]     | [place], [place]      |                   |
|        |        | [address]          | [address]  [phone]  |                   |
|        |        | [phone]        |  [phone] (Fax)  |                   |
+--------+--------+---------------------+----------------------+-------------------+
 
 
 
 Social History
 
 
+--------------+-------+-----------+--------+------+
| Tobacco Use  | Types | Packs/Day | Years  | Date |
|              |       |           | Used   |      |
+--------------+-------+-----------+--------+------+
| Never Smoker |       |           |        |      |
+--------------+-------+-----------+--------+------+
 
 
 
+---------------------+---+---+---+
| Smokeless Tobacco:  |   |   |   |
| Never Used          |   |   |   |
+---------------------+---+---+---+
 
 
 
+---------------------------------------------------------------+
| Comments: some second hand smoke exposure, but fairly minimal |
 
+---------------------------------------------------------------+
 
 
 
+-------------+-------------+---------+----------+
| Alcohol Use | Drinks/Week | oz/Week | Comments |
+-------------+-------------+---------+----------+
| No          |             |         |          |
+-------------+-------------+---------+----------+
 
 
 
+------------------+---------------+
| Sex Assigned at  | Date Recorded |
| Birth            |               |
+------------------+---------------+
| Not on file      |               |
+------------------+---------------+
 
 
 
+----------------+-------------+-------------+
| Job Start Date | Occupation  | Industry    |
+----------------+-------------+-------------+
| Not on file    | Not on file | Not on file |
+----------------+-------------+-------------+
 
 
 
+----------------+--------------+------------+
| Travel History | Travel Start | Travel End |
+----------------+--------------+------------+
 
 
 
+-------------------------------------+
| No recent travel history available. |
+-------------------------------------+
 documented as of this encounter
 
 Plan of Treatment
 
 
+--------+-----------+------------+----------------------+-------------------+
| Date   | Type      | Specialty  | Care Team            | Description       |
+--------+-----------+------------+----------------------+-------------------+
| / | Office    | Cardiology |   HellalfredoTonia,    |                   |
|    | Visit     |            | ARNP  [address]   |                   |
|        |           |            | St  [place], [place]  |                   |
|        |           |            | [address]  [phone]  |                   |
|        |           |            |  [phone] (Fax)  |                   |
+--------+-----------+------------+----------------------+-------------------+
| / | Hospital  | Radiology  |   Popeye Townsend, |                   |
|    | Encounter |            |  MD  [address] |                   |
|        |           |            |  St.  [place],   |                   |
|        |           |            | [place] [address]             |                   |
|        |           |            | [phone]         |                   |
|        |           |            | [phone] (Fax)   |                   |
+--------+-----------+------------+----------------------+-------------------+
| / | Surgery   | Radiology  |   Popeye Townsend, | CV EP PPM SYSTEM  |
 
|    |           |            |  MD  [address] | IMPLANT           |
|        |           |            |  St.  [place],   |                   |
|        |           |            | [place] [address]             |                   |
|        |           |            | [phone]         |                   |
|        |           |            | [phone] (Fax)   |                   |
+--------+-----------+------------+----------------------+-------------------+
| 02/10/ | Clinical  | Cardiology |                      |                   |
|    | Support   |            |                      |                   |
+--------+-----------+------------+----------------------+-------------------+
| / | Office    | Cardiology |   Tonia Wilson,    |                   |
|    | Visit     |            | ARNP  [address]   |                   |
|        |           |            | [place]  |                   |
|        |           |            | [address]  [phone]  |                   |
|        |           |            |  [phone] (Fax)  |                   |
+--------+-----------+------------+----------------------+-------------------+
| / | Off-Site  | Nephrology |   Marzena Moser  |                   |
|    | Visit     |            | DO ETIENNE  [address] [place] [address]      |                   |
|        |           |            | [address]      |                   |
|        |           |            | [place]      |                   |
|        |           |            | [address]  [phone]  |                   |
|        |           |            |  [phone] (Fax)  |                   |
+--------+-----------+------------+----------------------+-------------------+
 documented as of this encounter
 
 Visit Diagnoses
 Not on filedocumented in this encounter

## 2020-01-08 NOTE — XMS
Encounter Summary
  Created on: 2020
 
 Viviana Ricci
 External Reference #: [id number]
 : 47
 Sex: Female
 
 Demographics
 
 
+-----------------------+----------------------+
| Address               | [address]      |
|                       | [place]  [address] |
+-----------------------+----------------------+
| Home Phone            | [phone]      |
+-----------------------+----------------------+
| Preferred Language    | Unknown              |
+-----------------------+----------------------+
| Marital Status        | Single               |
+-----------------------+----------------------+
| Yarsani Affiliation | [id number]                 |
+-----------------------+----------------------+
| Race                  | Unknown              |
+-----------------------+----------------------+
| Ethnic Group          | Unknown              |
+-----------------------+----------------------+
 
 
 Author
 
 
+--------------+--------------------------------------------+
| Author       | [facility]  |
|              | and [place]                                |
+--------------+--------------------------------------------+
| Organization | [facility]  |
|              | and [place]                                |
+--------------+--------------------------------------------+
| Address      | Unknown                                    |
+--------------+--------------------------------------------+
| Phone        | Unavailable                                |
+--------------+--------------------------------------------+
 
 
 
 Support
 
 
+----------------+--------------+---------------------+-----------------+
| Name           | Relationship | Address             | Phone           |
+----------------+--------------+---------------------+-----------------+
| Ada/Ed Radhames | ECON         | [address]              | [phone] |
|                |              | [place]  |                 |
|                |              |  [address]              |                 |
+----------------+--------------+---------------------+-----------------+
 
 
 
 
 Care Team Providers
 
 
+-----------------------+------+-------------+
| Care Team Member Name | Role | Phone       |
+-----------------------+------+-------------+
 PCP  | Unavailable |
+-----------------------+------+-------------+
 
 
 
 Encounter Details
 
 
+--------+-----------+----------------------+----------------------+-------------+
| Date   | Type      | Department           | Care Team            | Description |
+--------+-----------+----------------------+----------------------+-------------+
| / | Hospital  |   [facility] |   Marzena Moser  |             |
| 2000   | Encounter |  [facility]  | M, DO  [address]      |             |
|        |           |  [address]  [place] | [address]      |             |
|        |           |  [place]           | [place]      |             |
|        |           | [address]           | [address]  [phone]  |             |
|        |           | [phone]         |  [phone] (Fax)  |             |
+--------+-----------+----------------------+----------------------+-------------+
 
 
 
 Social History
 
 
+----------------+-------+-----------+--------+------+
| Tobacco Use    | Types | Packs/Day | Years  | Date |
|                |       |           | Used   |      |
+----------------+-------+-----------+--------+------+
| Never Assessed |       |           |        |      |
+----------------+-------+-----------+--------+------+
 
 
 
+------------------+---------------+
| Sex Assigned at  | Date Recorded |
| Birth            |               |
+------------------+---------------+
| Not on file      |               |
+------------------+---------------+
 
 
 
+----------------+-------------+-------------+
| Job Start Date | Occupation  | Industry    |
+----------------+-------------+-------------+
| Not on file    | Not on file | Not on file |
+----------------+-------------+-------------+
 
 
 
+----------------+--------------+------------+
| Travel History | Travel Start | Travel End |
+----------------+--------------+------------+
 
 
 
 
+-------------------------------------+
| No recent travel history available. |
+-------------------------------------+
 documented as of this encounter
 
 Plan of Treatment
 
 
+--------+-----------+------------+----------------------+-------------------+
| Date   | Type      | Specialty  | Care Team            | Description       |
+--------+-----------+------------+----------------------+-------------------+
| / | Office    | Cardiology |   Tonia Wilson,    |                   |
| 2020   | Visit     |            | ARNJESSICA  [address]   |                   |
|        |           |            | St [place], [place]  |                   |
|        |           |            | [address]  [phone]  |                   |
|        |           |            |  [phone] (Fax)  |                   |
+--------+-----------+------------+----------------------+-------------------+
| / | Hospital  | Radiology  |   Popeye Townsend, |                   |
|    | Encounter |            |  MD [address] |                   |
|        |           |            |  [place],   |                   |
|        |           |            | [place] [address]             |                   |
|        |           |            | [phone]         |                   |
|        |           |            | [phone] (Fax)   |                   |
+--------+-----------+------------+----------------------+-------------------+
| / | Surgery   | Radiology  |   Popeye Townsend, | CV EP PPM SYSTEM  |
|    |           |            |  MD  [address] | IMPLANT           |
|        |           |            |  [place],   |                   |
|        |           |            | [place] [address]             |                   |
|        |           |            | [phone]         |                   |
|        |           |            | [phone] (Fax)   |                   |
+--------+-----------+------------+----------------------+-------------------+
| 02/10/ | Clinical  | Cardiology |                      |                   |
|    | Support   |            |                      |                   |
+--------+-----------+------------+----------------------+-------------------+
| / | Office    | Cardiology |   Tonia Wilson,    |                   |
|    | Visit     |            | BELKIS  [address]   |                   |
|        |           |            | [place]  |                   |
|        |           |            | [address]  [phone]  |                   |
|        |           |            |  [phone] (Fax)  |                   |
+--------+-----------+------------+----------------------+-------------------+
| / | Off-Site  | Nephrology |   Marzena Moser  |                   |
|    | Visit     |            | M, DO  [address]      |                   |
|        |           |            | [address]      |                   |
|        |           |            | [place]      |                   |
|        |           |            | [address] [phone]  |                   |
|        |           |            |  [phone] (Fax)  |                   |
+--------+-----------+------------+----------------------+-------------------+
 documented as of this encounter
 
 Visit Diagnoses
 Not on filedocumented in this encounter

## 2020-01-08 NOTE — XMS
Encounter Summary
  Created on: 2020
 
 Viviana Ricci
 External Reference #: [id number]
 : 47
 Sex: Female
 
 Demographics
 
 
+-----------------------+----------------------+
| Address               | [address]      |
|                       | [place]  [address] |
+-----------------------+----------------------+
| Home Phone            | [phone]      |
+-----------------------+----------------------+
| Preferred Language    | Unknown              |
+-----------------------+----------------------+
| Marital Status        | Single               |
+-----------------------+----------------------+
| Rastafari Affiliation | [id number]                 |
+-----------------------+----------------------+
| Race                  | Unknown              |
+-----------------------+----------------------+
| Ethnic Group          | Unknown              |
+-----------------------+----------------------+
 
 
 Author
 
 
+--------------+--------------------------------------------+
| Author       | [facility]  |
|              | and [place]                                |
+--------------+--------------------------------------------+
| Organization | [facility]  |
|              | and [place]                                |
+--------------+--------------------------------------------+
| Address      | Unknown                                    |
+--------------+--------------------------------------------+
| Phone        | Unavailable                                |
+--------------+--------------------------------------------+
 
 
 
 Support
 
 
+----------------+--------------+---------------------+-----------------+
| Name           | Relationship | Address             | Phone           |
+----------------+--------------+---------------------+-----------------+
| Ada/Ed Radhames | ECON         | [address]              | [phone] |
|                |              | [place]  |                 |
|                |              |  [address]              |                 |
+----------------+--------------+---------------------+-----------------+
 
 
 
 
 Care Team Providers
 
 
+-----------------------+------+-------------+
| Care Team Member Name | Role | Phone       |
+-----------------------+------+-------------+
 PCP  | Unavailable |
+-----------------------+------+-------------+
 
 
 
 Reason for Visit
 
 
+-------------------+----------+
| Reason            | Comments |
+-------------------+----------+
| Medication Refill |          |
+-------------------+----------+
 
 
 
 Encounter Details
 
 
+--------+--------+---------------------+----------------------+-------------------+
| Date   | Type   | Department          | Care Team            | Description       |
+--------+--------+---------------------+----------------------+-------------------+
| / | Refill |   [facility]         |   Marzena Moser  | Medication Refill |
|    |        | NEPHROLOGY  [address]   | M, DO  [address]      |                   |
|        |        | [address]   | [address]      |                   |
|        |        | [place], [place]     | [place], [place]      |                   |
|        |        | [address]          | [address]  [phone]  |                   |
|        |        | [phone]        |  [phone] (Fax)  |                   |
+--------+--------+---------------------+----------------------+-------------------+
 
 
 
 Social History
 
 
+--------------+-------+-----------+--------+------+
| Tobacco Use  | Types | Packs/Day | Years  | Date |
|              |       |           | Used   |      |
+--------------+-------+-----------+--------+------+
| Never Smoker |       |           |        |      |
+--------------+-------+-----------+--------+------+
 
 
 
+---------------------+---+---+---+
| Smokeless Tobacco:  |   |   |   |
| Never Used          |   |   |   |
+---------------------+---+---+---+
 
 
 
+---------------------------------------------------------------+
| Comments: some second hand smoke exposure, but fairly minimal |
 
+---------------------------------------------------------------+
 
 
 
+-------------+-------------+---------+----------+
| Alcohol Use | Drinks/Week | oz/Week | Comments |
+-------------+-------------+---------+----------+
| No          |             |         |          |
+-------------+-------------+---------+----------+
 
 
 
+------------------+---------------+
| Sex Assigned at  | Date Recorded |
| Birth            |               |
+------------------+---------------+
| Not on file      |               |
+------------------+---------------+
 
 
 
+----------------+-------------+-------------+
| Job Start Date | Occupation  | Industry    |
+----------------+-------------+-------------+
| Not on file    | Not on file | Not on file |
+----------------+-------------+-------------+
 
 
 
+----------------+--------------+------------+
| Travel History | Travel Start | Travel End |
+----------------+--------------+------------+
 
 
 
+-------------------------------------+
| No recent travel history available. |
+-------------------------------------+
 documented as of this encounter
 
 Plan of Treatment
 
 
+--------+-----------+------------+----------------------+-------------------+
| Date   | Type      | Specialty  | Care Team            | Description       |
+--------+-----------+------------+----------------------+-------------------+
| / | Office    | Cardiology |   HellalfredoTonia,    |                   |
|    | Visit     |            | ARNP  [address]   |                   |
|        |           |            | St  [place], [place]  |                   |
|        |           |            | [address]  [phone]  |                   |
|        |           |            |  [phone] (Fax)  |                   |
+--------+-----------+------------+----------------------+-------------------+
| / | Hospital  | Radiology  |   Popeye Townsend, |                   |
|    | Encounter |            |  MD  [address] |                   |
|        |           |            |  St.  [place],   |                   |
|        |           |            | [place] [address]             |                   |
|        |           |            | [phone]         |                   |
|        |           |            | [phone] (Fax)   |                   |
+--------+-----------+------------+----------------------+-------------------+
| / | Surgery   | Radiology  |   Popeye Townsend, | CV EP PPM SYSTEM  |
 
|    |           |            |  MD  [address] | IMPLANT           |
|        |           |            |  St.  [place],   |                   |
|        |           |            | [place] [address]             |                   |
|        |           |            | [phone]         |                   |
|        |           |            | [phone] (Fax)   |                   |
+--------+-----------+------------+----------------------+-------------------+
| 02/10/ | Clinical  | Cardiology |                      |                   |
|    | Support   |            |                      |                   |
+--------+-----------+------------+----------------------+-------------------+
| / | Office    | Cardiology |   Tonia Wilson,    |                   |
|    | Visit     |            | ARNP  [address]   |                   |
|        |           |            | [place]  |                   |
|        |           |            | [address]  [phone]  |                   |
|        |           |            |  [phone] (Fax)  |                   |
+--------+-----------+------------+----------------------+-------------------+
| / | Off-Site  | Nephrology |   Marzena Moser  |                   |
|    | Visit     |            | DO ETIENNE  [address] [place] [address]      |                   |
|        |           |            | [address]      |                   |
|        |           |            | [place]      |                   |
|        |           |            | [address]  [phone]  |                   |
|        |           |            |  [phone] (Fax)  |                   |
+--------+-----------+------------+----------------------+-------------------+
 documented as of this encounter
 
 Visit Diagnoses
 
 
+------------------------------------------------------------------------------------------+
| Diagnosis                                                                                |
+------------------------------------------------------------------------------------------+
|   Type II diabetes mellitus, uncontrolled (HCC) - Primary  Type II or unspecified type   |
| diabetes mellitus without mention of complication, uncontrolled                          |
+------------------------------------------------------------------------------------------+
|   Insulin dependent type 2 diabetes mellitus, uncontrolled ([facility])  Type II or unspecified |
|  type diabetes mellitus without mention of complication, uncontrolled                    |
+------------------------------------------------------------------------------------------+
 documented in this encounter

## 2020-01-08 NOTE — XMS
Encounter Summary
  Created on: 2020
 
 Viviana Ricci
 External Reference #: [id number]
 : 47
 Sex: Female
 
 Demographics
 
 
+-----------------------+----------------------+
| Address               | [address]      |
|                       | [place]  [address] |
+-----------------------+----------------------+
| Home Phone            | [phone]      |
+-----------------------+----------------------+
| Preferred Language    | Unknown              |
+-----------------------+----------------------+
| Marital Status        | Single               |
+-----------------------+----------------------+
| Muslim Affiliation | [id number]                 |
+-----------------------+----------------------+
| Race                  | Unknown              |
+-----------------------+----------------------+
| Ethnic Group          | Unknown              |
+-----------------------+----------------------+
 
 
 Author
 
 
+--------------+--------------------------------------------+
| Author       |  [facility]  |
|              | and [place]                                |
+--------------+--------------------------------------------+
| Organization |  [facility]  |
|              | and [place]                                |
+--------------+--------------------------------------------+
| Address      | Unknown                                    |
+--------------+--------------------------------------------+
| Phone        | Unavailable                                |
+--------------+--------------------------------------------+
 
 
 
 Support
 
 
+----------------+--------------+---------------------+-----------------+
| Name           | Relationship | Address             | Phone           |
+----------------+--------------+---------------------+-----------------+
| Ada/Ed Radhames | ECON         | [place]              | [phone] |
|                |              | [place]  |                 |
|                |              |  [address]              |                 |
+----------------+--------------+---------------------+-----------------+
 
 
 
 
 Care Team Providers
 
 
+-----------------------+------+-------------+
| Care Team Member Name | Role | Phone       |
+-----------------------+------+-------------+
 PCP  | Unavailable |
+-----------------------+------+-------------+
 
 
 
 Reason for Visit
 
 
+-------------------+----------+
| Reason            | Comments |
+-------------------+----------+
| Medication Refill |          |
+-------------------+----------+
 
 
 
 Encounter Details
 
 
+--------+--------+---------------------+----------------------+-------------------+
| Date   | Type   | Department          | Care Team            | Description       |
+--------+--------+---------------------+----------------------+-------------------+
| / | Refill |   [facility]         |   Marzena Moser  | Medication Refill |
|    |        | NEPHROLOGY  [address]   | M, DO  [address]      |                   |
|        |        | [address]   | [address]      |                   |
|        |        | [place], [place]     | [place], [place]      |                   |
|        |        | [address]          | [address]  [phone]  |                   |
|        |        | [phone]        |  [phone] (Fax)  |                   |
+--------+--------+---------------------+----------------------+-------------------+
 
 
 
 Social History
 
 
+--------------+-------+-----------+--------+------+
| Tobacco Use  | Types | Packs/Day | Years  | Date |
|              |       |           | Used   |      |
+--------------+-------+-----------+--------+------+
| Never Smoker |       |           |        |      |
+--------------+-------+-----------+--------+------+
 
 
 
+---------------------+---+---+---+
| Smokeless Tobacco:  |   |   |   |
| Never Used          |   |   |   |
+---------------------+---+---+---+
 
 
 
+---------------------------------------------------------------+
| Comments: some second hand smoke exposure, but fairly minimal |
 
+---------------------------------------------------------------+
 
 
 
+-------------+-------------+---------+----------+
| Alcohol Use | Drinks/Week | oz/Week | Comments |
+-------------+-------------+---------+----------+
| No          |             |         |          |
+-------------+-------------+---------+----------+
 
 
 
+------------------+---------------+
| Sex Assigned at  | Date Recorded |
| Birth            |               |
+------------------+---------------+
| Not on file      |               |
+------------------+---------------+
 
 
 
+----------------+-------------+-------------+
| Job Start Date | Occupation  | Industry    |
+----------------+-------------+-------------+
| Not on file    | Not on file | Not on file |
+----------------+-------------+-------------+
 
 
 
+----------------+--------------+------------+
| Travel History | Travel Start | Travel End |
+----------------+--------------+------------+
 
 
 
+-------------------------------------+
| No recent travel history available. |
+-------------------------------------+
 documented as of this encounter
 
 Plan of Treatment
 
 
+--------+-----------+------------+----------------------+-------------------+
| Date   | Type      | Specialty  | Care Team            | Description       |
+--------+-----------+------------+----------------------+-------------------+
| / | Office    | Cardiology |   HellalfredoTonia,    |                   |
|    | Visit     |            | ARNP  [address]   |                   |
|        |           |            | St  [place], [place]  |                   |
|        |           |            | [address]  [phone]  |                   |
|        |           |            |  [phone] (Fax)  |                   |
+--------+-----------+------------+----------------------+-------------------+
| / | Hospital  | Radiology  |   Popeye Townsend, |                   |
|    | Encounter |            |  MD  [address] |                   |
|        |           |            |  St.  [place],   |                   |
|        |           |            | [place] [address]             |                   |
|        |           |            | [phone]         |                   |
|        |           |            | [phone] (Fax)   |                   |
+--------+-----------+------------+----------------------+-------------------+
| / | Surgery   | Radiology  |   Popeye Townsend, | CV EP PPM SYSTEM  |
 
|    |           |            |  MD  [address] | IMPLANT           |
|        |           |            |  St.  [place],   |                   |
|        |           |            | [place] [address]             |                   |
|        |           |            | [phone]         |                   |
|        |           |            | [phone] (Fax)   |                   |
+--------+-----------+------------+----------------------+-------------------+
| 02/10/ | Clinical  | Cardiology |                      |                   |
|    | Support   |            |                      |                   |
+--------+-----------+------------+----------------------+-------------------+
| / | Office    | Cardiology |   Tonia Wilson,    |                   |
|    | Visit     |            | ARNP  [address]   |                   |
|        |           |            | [place]  |                   |
|        |           |            | [address]  [phone]  |                   |
|        |           |            |  [phone] (Fax)  |                   |
+--------+-----------+------------+----------------------+-------------------+
| / | Off-Site  | Nephrology |   Marzena Moser  |                   |
|    | Visit     |            | DO ETIENNE  [address] [place] [address]      |                   |
|        |           |            | [address]      |                   |
|        |           |            | [place]      |                   |
|        |           |            | [address]  [phone]  |                   |
|        |           |            |  [phone] (Fax)  |                   |
+--------+-----------+------------+----------------------+-------------------+
 documented as of this encounter
 
 Visit Diagnoses
 Not on filedocumented in this encounter

## 2020-01-08 NOTE — XMS
Encounter Summary
  Created on: 2020
 
 Viviana Ricci
 External Reference #: [id number]
 : 47
 Sex: Female
 
 Demographics
 
 
+-----------------------+----------------------+
| Address               | [address]      |
|                       | [place]  [address] |
+-----------------------+----------------------+
| Home Phone            | [phone]      |
+-----------------------+----------------------+
| Preferred Language    | Unknown              |
+-----------------------+----------------------+
| Marital Status        | Single               |
+-----------------------+----------------------+
| Sikh Affiliation | 1009                 |
+-----------------------+----------------------+
| Race                  | Unknown              |
+-----------------------+----------------------+
| Ethnic Group          | Unknown              |
+-----------------------+----------------------+
 
 
 Author
 
 
+--------------+--------------------------------------------+
| Author       | [facility]  |
|              | and [place]                                |
+--------------+--------------------------------------------+
| Organization | [facility]  |
|              | and [place]                                |
+--------------+--------------------------------------------+
| Address      | Unknown                                    |
+--------------+--------------------------------------------+
| Phone        | Unavailable                                |
+--------------+--------------------------------------------+
 
 
 
 Support
 
 
+----------------+--------------+---------------------+-----------------+
| Name           | Relationship | Address             | Phone           |
+----------------+--------------+---------------------+-----------------+
| Ada/Ed Radhames | ECON         | [address]              | [phone] |
|                |              | [place]  |                 |
|                |              |  [address]              |                 |
+----------------+--------------+---------------------+-----------------+
 
 
 
 
 Care Team Providers
 
 
+------------------------+------+-----------------+
| Care Team Member Name  | Role | Phone           |
+------------------------+------+-----------------+
| Marzena Moser DO | PCP  | [phone] |
+------------------------+------+-----------------+
 
 
 
 Encounter Details
 
 
+--------+----------+---------------------+----------------------+-------------+
| Date   | Type     | Department          | Care Team            | Description |
+--------+----------+---------------------+----------------------+-------------+
| / | Abstract |   [facility] [place]         |   Marzena Moser  |             |
|    |          | NEPHROLOGY  [address]   | DO ETIENNE  [address]      |             |
|        |          | [address]   | [address]      |             |
|        |          | [place], [place]     | [place], [place]      |             |
|        |          | [address]          | [address]  [phone]  |             |
|        |          | [phone]        |  [phone] (Fax)  |             |
+--------+----------+---------------------+----------------------+-------------+
 
 
 
 Social History
 
 
+--------------+-------+-----------+--------+------+
| Tobacco Use  | Types | Packs/Day | Years  | Date |
|              |       |           | Used   |      |
+--------------+-------+-----------+--------+------+
| Never Smoker |       |           |        |      |
+--------------+-------+-----------+--------+------+
 
 
 
+---------------------+---+---+---+
| Smokeless Tobacco:  |   |   |   |
| Never Used          |   |   |   |
+---------------------+---+---+---+
 
 
 
+---------------------------------------------------------------+
| Comments: some second hand smoke exposure, but fairly minimal |
+---------------------------------------------------------------+
 
 
 
+-------------+-------------+---------+----------+
| Alcohol Use | Drinks/Week | oz/Week | Comments |
+-------------+-------------+---------+----------+
| No          |             |         |          |
+-------------+-------------+---------+----------+
 
 
 
 
+------------------+---------------+
| Sex Assigned at  | Date Recorded |
| Birth            |               |
+------------------+---------------+
| Not on file      |               |
+------------------+---------------+
 
 
 
+----------------+-------------+-------------+
| Job Start Date | Occupation  | Industry    |
+----------------+-------------+-------------+
| Not on file    | Not on file | Not on file |
+----------------+-------------+-------------+
 
 
 
+----------------+--------------+------------+
| Travel History | Travel Start | Travel End |
+----------------+--------------+------------+
 
 
 
+-------------------------------------+
| No recent travel history available. |
+-------------------------------------+
 documented as of this encounter
 
 Plan of Treatment
 
 
+--------+-----------+------------+----------------------+-------------------+
| Date   | Type      | Specialty  | Care Team            | Description       |
+--------+-----------+------------+----------------------+-------------------+
| / | Office    | Cardiology |   Tonia Wilson,    |                   |
|    | Visit     |            | ARNP  [address]   |                   |
|        |           |            | St  [place] [facility] [place]  |                   |
|        |           |            | [address]  [phone]  |                   |
|        |           |            |  [phone] (Fax)  |                   |
+--------+-----------+------------+----------------------+-------------------+
| / | Hospital  | Radiology  |   Popeye Townsend, |                   |
|    | Encounter |            |  MD  [address] |                   |
|        |           |            |  St.  [place],   |                   |
|        |           |            | [place] [address]             |                   |
|        |           |            | [phone]         |                   |
|        |           |            | [phone] (Fax)   |                   |
+--------+-----------+------------+----------------------+-------------------+
| / | Surgery   | Radiology  |   Popeye Townsend, | CV EP PPM SYSTEM  |
|    |           |            |  MD  [address] | IMPLANT           |
|        |           |            |  St.  [place],   |                   |
|        |           |            | [place] [address]             |                   |
|        |           |            | [phone]         |                   |
|        |           |            | [phone] (Fax)   |                   |
+--------+-----------+------------+----------------------+-------------------+
| 02/10/ | Clinical  | Cardiology |                      |                   |
2020   | Support   |            |                      |                   |
+--------+-----------+------------+----------------------+-------------------+
| / | Office    | Cardiology |   Tonia Wilson,    |                   |
|    | Visit     |            | [facility]  [address]   |                   |
 
|        |           |            |   [place]  |                   |
|        |           |            | [address]  [phone]  |                   |
|        |           |            |  [phone] (Fax)  |                   |
+--------+-----------+------------+----------------------+-------------------+
| / | Off-Site  | Nephrology |   Marzena Moser  |                   |
2020   | Visit     |            | DO ETIENNE  [address]      |                   |
|        |           |            | [address]      |                   |
|        |           |            | [place]      |                   |
|        |           |            | [address]  [phone]  |                   |
|        |           |            |  [phone] (Fax)  |                   |
+--------+-----------+------------+----------------------+-------------------+
 documented as of this encounter
 
 Procedures
 
 
+--------------------+--------+------------+----------------------+----------------------+
| Procedure Name     | Priori | Date/Time  | Associated Diagnosis | Comments             |
|                    | ty     |            |                      |                      |
+--------------------+--------+------------+----------------------+----------------------+
| EXTERNAL LAB:      | Routin | 2018 |                      |   Results for this   |
| TACROLIMUS LEVEL,  | e      |            |                      | procedure are in the |
| [facility]               |        |            |                      |  results section.    |
+--------------------+--------+------------+----------------------+----------------------+
 documented in this encounter
 
 Results
 External Lab: Tacrolimus Level, [facility] (2018)
 
+-------------+-------+-----------+------------+--------------+
| Component   | Value | Ref Range | Performed  | Pathologist  |
|             |       |           | At         | Signature    |
+-------------+-------+-----------+------------+--------------+
| Tacrolimus, | 3.5   |           |            |              |
|  [facility],      |       |           |            |              |
| External    |       |           |            |              |
+-------------+-------+-----------+------------+--------------+
 
 
 
+----------+
| Specimen |
+----------+
|          |
+----------+
 documented in this encounter
 
 Visit Diagnoses
 Not on filedocumented in this encounter

## 2020-01-08 NOTE — XMS
Encounter Summary
  Created on: 2020
 
 Viviana Ricci
 External Reference #: [id number]
 : 47
 Sex: Female
 
 Demographics
 
 
+-----------------------+----------------------+
| Address               | [address]      |
|                       | [place]  [address] |
+-----------------------+----------------------+
| Home Phone            | [phone]      |
+-----------------------+----------------------+
| Preferred Language    | Unknown              |
+-----------------------+----------------------+
| Marital Status        | Single               |
+-----------------------+----------------------+
| Baptism Affiliation | 1009                 |
+-----------------------+----------------------+
| Race                  | Unknown              |
+-----------------------+----------------------+
| Ethnic Group          | Unknown              |
+-----------------------+----------------------+
 
 
 Author
 
 
+--------------+--------------------------------------------+
| Author       | [facility] [place]  |
|              | and [place]                                |
+--------------+--------------------------------------------+
| Organization | [facility] [place]  |
|              | and [place]                                |
+--------------+--------------------------------------------+
| Address      | Unknown                                    |
+--------------+--------------------------------------------+
| Phone        | Unavailable                                |
+--------------+--------------------------------------------+
 
 
 
 Support
 
 
+----------------+--------------+---------------------+-----------------+
| Name           | Relationship | Address             | Phone           |
+----------------+--------------+---------------------+-----------------+
| Ada/Ed Radhames | ECON         | [address]              | [phone] |
|                |              | [place]  |                 |
|                |              |  [address]              |                 |
+----------------+--------------+---------------------+-----------------+
 
 
 
 
 Care Team Providers
 
 
+-----------------------+------+-------------+
| Care Team Member Name | Role | Phone       |
+-----------------------+------+-------------+
 PCP  | Unavailable |
+-----------------------+------+-------------+
 
 
 
 Encounter Details
 
 
+--------+-----------+----------------------+----------------------+-------------+
| Date   | Type      | Department           | Care Team            | Description |
+--------+-----------+----------------------+----------------------+-------------+
| / | [facility]  |   [facility] |   Marzena Moser  |             |
|    | Encounter |  MED CTR XRAY  [address] | M, DO  [address]      |             |
|        |           |  [place]       | [address]      |             |
|        |           | [place] [address] | [place]      |             |
|        |           |   [phone]       | [address] [phone]  |             |
|        |           |                      |  [phone] (Fax)  |             |
+--------+-----------+----------------------+----------------------+-------------+
 
 
 
 Social History
 
 
+----------------+-------+-----------+--------+------+
| Tobacco Use    | Types | Packs/Day | Years  | Date |
|                |       |           | Used   |      |
+----------------+-------+-----------+--------+------+
| Never Assessed |       |           |        |      |
+----------------+-------+-----------+--------+------+
 
 
 
+------------------+---------------+
| Sex Assigned at  | Date Recorded |
| Birth            |               |
+------------------+---------------+
| Not on file      |               |
+------------------+---------------+
 
 
 
+----------------+-------------+-------------+
| Job Start Date | Occupation  | Industry    |
+----------------+-------------+-------------+
| Not on file    | Not on file | Not on file |
+----------------+-------------+-------------+
 
 
 
+----------------+--------------+------------+
| Travel History | Travel Start | Travel End |
+----------------+--------------+------------+
 
 
 
 
+-------------------------------------+
| No recent travel history available. |
+-------------------------------------+
 documented as of this encounter
 
 Plan of Treatment
 
 
+--------+-----------+------------+----------------------+-------------------+
| Date   | Type      | Specialty  | Care Team            | Description       |
+--------+-----------+------------+----------------------+-------------------+
| / | Office    | Cardiology |   Tonia Wilson,    |                   |
|    | Visit     |            | ARNJESSICA  [address]   |                   |
|        |           |            | St  [place], [place]  |                   |
|        |           |            | [address]  [phone]  |                   |
|        |           |            |  [phone] (Fax)  |                   |
+--------+-----------+------------+----------------------+-------------------+
| / | Hospital  | Radiology  |   Popeye Townsend, |                   |
|    | Encounter |            |  MD  [address] |                   |
|        |           |            |  St. [place],   |                   |
|        |           |            | [place] [address]             |                   |
|        |           |            | [phone]         |                   |
|        |           |            | [phone] (Fax)   |                   |
+--------+-----------+------------+----------------------+-------------------+
| / | Surgery   | Radiology  |   Popeye Townsend, | CV EP PPM SYSTEM  |
|    |           |            |  MD  [address] | IMPLANT           |
|        |           |            |  [place],   |                   |
|        |           |            | [place] [address]             |                   |
|        |           |            | [phone]         |                   |
|        |           |            | [phone] (Fax)   |                   |
+--------+-----------+------------+----------------------+-------------------+
| 02/10/ | Clinical  | Cardiology |                      |                   |
|    | Support   |            |                      |                   |
+--------+-----------+------------+----------------------+-------------------+
| / | Office    | Cardiology |   Tonia Wilson,    |                   |
|    | Visit     |            | BELKIS  [address]   |                   |
|        |           |            | [place]  |                   |
|        |           |            | [address]  [phone]  |                   |
|        |           |            |  [phone] (Fax)  |                   |
+--------+-----------+------------+----------------------+-------------------+
| / | Off-Site  | Nephrology |   Marzena Moser  |                   |
|    | Visit     |            | DO ETIENNE  [address] [place] [address]      |                   |
|        |           |            | [address]      |                   |
|        |           |            | [place]      |                   |
|        |           |            | [address] [phone]  |                   |
|        |           |            |  [phone] (Fax)  |                   |
+--------+-----------+------------+----------------------+-------------------+
 documented as of this encounter
 
 Visit Diagnoses
 Not on filedocumented in this encounter

## 2020-01-08 NOTE — XMS
Encounter Summary
  Created on: 2020
 
 Viviana Ricci
 External Reference #: [id number]
 : 47
 Sex: Female
 
 Demographics
 
 
+-----------------------+----------------------+
| Address               | [address]      |
|                       | [place]  [address] |
+-----------------------+----------------------+
| Home Phone            | [phone]      |
+-----------------------+----------------------+
| Preferred Language    | Unknown              |
+-----------------------+----------------------+
| Marital Status        | Single               |
+-----------------------+----------------------+
| Buddhist Affiliation | 1009                 |
+-----------------------+----------------------+
| Race                  | Unknown              |
+-----------------------+----------------------+
| Ethnic Group          | Unknown              |
+-----------------------+----------------------+
 
 
 Author
 
 
+--------------+--------------------------------------------+
| Author       | [facility] [place]  |
|              | and [place]                                |
+--------------+--------------------------------------------+
| Organization | [facility] [place]  |
|              | and [place]                                |
+--------------+--------------------------------------------+
| Address      | Unknown                                    |
+--------------+--------------------------------------------+
| Phone        | Unavailable                                |
+--------------+--------------------------------------------+
 
 
 
 Support
 
 
+----------------+--------------+---------------------+-----------------+
| Name           | Relationship | Address             | Phone           |
+----------------+--------------+---------------------+-----------------+
| Ada/Ed Radhames | ECON         | [address]              | [phone] |
|                |              | [place]  |                 |
|                |              |  [address]              |                 |
+----------------+--------------+---------------------+-----------------+
 
 
 
 
 Care Team Providers
 
 
+-----------------------+------+-------------+
| Care Team Member Name | Role | Phone       |
+-----------------------+------+-------------+
 PCP  | Unavailable |
+-----------------------+------+-------------+
 
 
 
 Encounter Details
 
 
+--------+-----------+----------------------+----------------------+-------------+
| Date   | Type      | Department           | Care Team            | Description |
+--------+-----------+----------------------+----------------------+-------------+
| / | [facility]  |   [facility] |   Marzena Moser  |             |
|    | Encounter |  MED CTR XRAY  [address] | M, DO  [address]      |             |
|        |           |  [place]       | [address]      |             |
|        |           | [place] [address] | [place]      |             |
|        |           |   [phone]       | [address] [phone]  |             |
|        |           |                      |  [phone] (Fax)  |             |
+--------+-----------+----------------------+----------------------+-------------+
 
 
 
 Social History
 
 
+----------------+-------+-----------+--------+------+
| Tobacco Use    | Types | Packs/Day | Years  | Date |
|                |       |           | Used   |      |
+----------------+-------+-----------+--------+------+
| Never Assessed |       |           |        |      |
+----------------+-------+-----------+--------+------+
 
 
 
+------------------+---------------+
| Sex Assigned at  | Date Recorded |
| Birth            |               |
+------------------+---------------+
| Not on file      |               |
+------------------+---------------+
 
 
 
+----------------+-------------+-------------+
| Job Start Date | Occupation  | Industry    |
+----------------+-------------+-------------+
| Not on file    | Not on file | Not on file |
+----------------+-------------+-------------+
 
 
 
+----------------+--------------+------------+
| Travel History | Travel Start | Travel End |
+----------------+--------------+------------+
 
 
 
 
+-------------------------------------+
| No recent travel history available. |
+-------------------------------------+
 documented as of this encounter
 
 Plan of Treatment
 
 
+--------+-----------+------------+----------------------+-------------------+
| Date   | Type      | Specialty  | Care Team            | Description       |
+--------+-----------+------------+----------------------+-------------------+
| / | Office    | Cardiology |   Tonia Wilson,    |                   |
|    | Visit     |            | ARNJESSICA  [address]   |                   |
|        |           |            | St  [place], [place]  |                   |
|        |           |            | [address]  [phone]  |                   |
|        |           |            |  [phone] (Fax)  |                   |
+--------+-----------+------------+----------------------+-------------------+
| / | Hospital  | Radiology  |   Popeye Townsend, |                   |
|    | Encounter |            |  MD  [address] |                   |
|        |           |            |  St. [place],   |                   |
|        |           |            | [place] [address]             |                   |
|        |           |            | [phone]         |                   |
|        |           |            | [phone] (Fax)   |                   |
+--------+-----------+------------+----------------------+-------------------+
| / | Surgery   | Radiology  |   Popeye Townsend, | CV EP PPM SYSTEM  |
|    |           |            |  MD  [address] | IMPLANT           |
|        |           |            |  [place],   |                   |
|        |           |            | [place] [address]             |                   |
|        |           |            | [phone]         |                   |
|        |           |            | [phone] (Fax)   |                   |
+--------+-----------+------------+----------------------+-------------------+
| 02/10/ | Clinical  | Cardiology |                      |                   |
|    | Support   |            |                      |                   |
+--------+-----------+------------+----------------------+-------------------+
| / | Office    | Cardiology |   Tonia Wilson,    |                   |
|    | Visit     |            | BELKIS  [address]   |                   |
|        |           |            | [place]  |                   |
|        |           |            | [address]  [phone]  |                   |
|        |           |            |  [phone] (Fax)  |                   |
+--------+-----------+------------+----------------------+-------------------+
| / | Off-Site  | Nephrology |   Marzena Moser  |                   |
|    | Visit     |            | DO ETIENNE  [address] [place] [address]      |                   |
|        |           |            | [address]      |                   |
|        |           |            | [place]      |                   |
|        |           |            | [address] [phone]  |                   |
|        |           |            |  [phone] (Fax)  |                   |
+--------+-----------+------------+----------------------+-------------------+
 documented as of this encounter
 
 Visit Diagnoses
 Not on filedocumented in this encounter

## 2020-01-08 NOTE — XMS
Encounter Summary
  Created on: 2020
 
 Viviana Ricci
 External Reference #: [id number]
 : 47
 Sex: Female
 
 Demographics
 
 
+-----------------------+----------------------+
| Address               | [address]      |
|                       | [place]  [address] |
+-----------------------+----------------------+
| Home Phone            | [phone]      |
+-----------------------+----------------------+
| Preferred Language    | Unknown              |
+-----------------------+----------------------+
| Marital Status        | Single               |
+-----------------------+----------------------+
| Temple Affiliation | 1009                 |
+-----------------------+----------------------+
| Race                  | Unknown              |
+-----------------------+----------------------+
| Ethnic Group          | Unknown              |
+-----------------------+----------------------+
 
 
 Author
 
 
+--------------+--------------------------------------------+
| Author       | [facility] [place]  |
|              | and [place]                                |
+--------------+--------------------------------------------+
| Organization | [facility] [place]  |
|              | and [place]                                |
+--------------+--------------------------------------------+
| Address      | Unknown                                    |
+--------------+--------------------------------------------+
| Phone        | Unavailable                                |
+--------------+--------------------------------------------+
 
 
 
 Support
 
 
+----------------+--------------+---------------------+-----------------+
| Name           | Relationship | Address             | Phone           |
+----------------+--------------+---------------------+-----------------+
| Ada/Ed Radhames | ECON         | [address]              | [phone] |
|                |              | [place]  |                 |
|                |              |  [address]              |                 |
+----------------+--------------+---------------------+-----------------+
 
 
 
 
 Care Team Providers
 
 
+-----------------------+------+-------------+
| Care Team Member Name | Role | Phone       |
+-----------------------+------+-------------+
 PCP  | Unavailable |
+-----------------------+------+-------------+
 
 
 
 Reason for Visit
 
 
+-------------------+----------+
| Reason            | Comments |
+-------------------+----------+
| Medication Refill |          |
+-------------------+----------+
 
 
 
 Encounter Details
 
 
+--------+--------+---------------------+----------------------+-------------------+
| Date   | Type   | Department          | Care Team            | Description       |
+--------+--------+---------------------+----------------------+-------------------+
| / | Refill |   [facility]         |   Marzena Moser  | Medication Refill |
|    |        | NEPHROLOGY  [address]   | M, DO  [address]      |                   |
|        |        | [address]   | [address]      |                   |
|        |        | [place], [place]     | [place], [place]      |                   |
|        |        | [address]          | [address]  [phone]  |                   |
|        |        | [phone]        |  [phone] (Fax)  |                   |
+--------+--------+---------------------+----------------------+-------------------+
 
 
 
 Social History
 
 
+--------------+-------+-----------+--------+------+
| Tobacco Use  | Types | Packs/Day | Years  | Date |
|              |       |           | Used   |      |
+--------------+-------+-----------+--------+------+
| Never Smoker |       |           |        |      |
+--------------+-------+-----------+--------+------+
 
 
 
+---------------------+---+---+---+
| Smokeless Tobacco:  |   |   |   |
| Never Used          |   |   |   |
+---------------------+---+---+---+
 
 
 
+---------------------------------------------------------------+
| Comments: some second hand smoke exposure, but fairly minimal |
 
+---------------------------------------------------------------+
 
 
 
+-------------+-------------+---------+----------+
| Alcohol Use | Drinks/Week | oz/Week | Comments |
+-------------+-------------+---------+----------+
| No          |             |         |          |
+-------------+-------------+---------+----------+
 
 
 
+------------------+---------------+
| Sex Assigned at  | Date Recorded |
| Birth            |               |
+------------------+---------------+
| Not on file      |               |
+------------------+---------------+
 
 
 
+----------------+-------------+-------------+
| Job Start Date | Occupation  | Industry    |
+----------------+-------------+-------------+
| Not on file    | Not on file | Not on file |
+----------------+-------------+-------------+
 
 
 
+----------------+--------------+------------+
| Travel History | Travel Start | Travel End |
+----------------+--------------+------------+
 
 
 
+-------------------------------------+
| No recent travel history available. |
+-------------------------------------+
 documented as of this encounter
 
 Plan of Treatment
 
 
+--------+-----------+------------+----------------------+-------------------+
| Date   | Type      | Specialty  | Care Team            | Description       |
+--------+-----------+------------+----------------------+-------------------+
| / | Office    | Cardiology |   HellalfredoTonia,    |                   |
|    | Visit     |            | ARNP  [address]   |                   |
|        |           |            | St  [place], [place]  |                   |
|        |           |            | [address]  [phone]  |                   |
|        |           |            |  [phone] (Fax)  |                   |
+--------+-----------+------------+----------------------+-------------------+
| / | Hospital  | Radiology  |   Popeye Townsend, |                   |
|    | Encounter |            |  MD  [address] |                   |
|        |           |            |  St.  [place],   |                   |
|        |           |            | [place] [address]             |                   |
|        |           |            | [phone]         |                   |
|        |           |            | [phone] (Fax)   |                   |
+--------+-----------+------------+----------------------+-------------------+
| / | Surgery   | Radiology  |   Popeye Townsend, | CV EP PPM SYSTEM  |
 
|    |           |            |  MD  [address] | IMPLANT           |
|        |           |            |  St.  [place],   |                   |
|        |           |            | [place] [address]             |                   |
|        |           |            | [phone]         |                   |
|        |           |            | [phone] (Fax)   |                   |
+--------+-----------+------------+----------------------+-------------------+
| 02/10/ | Clinical  | Cardiology |                      |                   |
|    | Support   |            |                      |                   |
+--------+-----------+------------+----------------------+-------------------+
| / | Office    | Cardiology |   Tonia Wilson,    |                   |
|    | Visit     |            | ARNP  [address]   |                   |
|        |           |            | [place]  |                   |
|        |           |            | [address]  [phone]  |                   |
|        |           |            |  [phone] (Fax)  |                   |
+--------+-----------+------------+----------------------+-------------------+
| / | Off-Site  | Nephrology |   Marzena Moser  |                   |
|    | Visit     |            | DO ETIENNE  [address] [place] [address]      |                   |
|        |           |            | [address]      |                   |
|        |           |            | [place]      |                   |
|        |           |            | [address]  [phone]  |                   |
|        |           |            |  [phone] (Fax)  |                   |
+--------+-----------+------------+----------------------+-------------------+
 documented as of this encounter
 
 Visit Diagnoses
 Not on filedocumented in this encounter

## 2020-01-08 NOTE — XMS
Encounter Summary
  Created on: 2020
 
 Viviana Ricci
 External Reference #: [id number]
 : 47
 Sex: Female
 
 Demographics
 
 
+-----------------------+----------------------+
| Address               | [address]      |
|                       | [place]  [address] |
+-----------------------+----------------------+
| Home Phone            | [phone]      |
+-----------------------+----------------------+
| Preferred Language    | Unknown              |
+-----------------------+----------------------+
| Marital Status        | Single               |
+-----------------------+----------------------+
| Restoration Affiliation | [id number]                 |
+-----------------------+----------------------+
| Race                  | Unknown              |
+-----------------------+----------------------+
| Ethnic Group          | Unknown              |
+-----------------------+----------------------+
 
 
 Author
 
 
+--------------+--------------------------------------------+
| Author       | [facility]  |
|              | and [place]                                |
+--------------+--------------------------------------------+
| Organization | [facility]  |
|              | and [place]                                |
+--------------+--------------------------------------------+
| Address      | Unknown                                    |
+--------------+--------------------------------------------+
| Phone        | Unavailable                                |
+--------------+--------------------------------------------+
 
 
 
 Support
 
 
+----------------+--------------+---------------------+-----------------+
| Name           | Relationship | Address             | Phone           |
+----------------+--------------+---------------------+-----------------+
| Ada/Ed Radhames | ECON         | [address]              | [phone] |
|                |              | [place]  |                 |
|                |              |  [id number]              |                 |
+----------------+--------------+---------------------+-----------------+
 
 
 
 
 Care Team Providers
 
 
+-----------------------+------+-------------+
| Care Team Member Name | Role | Phone       |
+-----------------------+------+-------------+
 PCP  | Unavailable |
+-----------------------+------+-------------+
 
 
 
 Encounter Details
 
 
+--------+----------+---------------------+----------------------+-------------+
| Date   | Type     | Department          | Care Team            | Description |
+--------+----------+---------------------+----------------------+-------------+
| 07/15/ | Abstract |   [facility] [place]         |   Marzena Moser  |             |
|    |          | NEPHROLOGY  [address]   | M, DO  [address]      |             |
|        |          | [address]   | [address]      |             |
|        |          | [place], [place]     | [place]      |             |
|        |          | [address]          | [address]  [phone]  |             |
|        |          | [phone]        |  [phone] (Fax)  |             |
+--------+----------+---------------------+----------------------+-------------+
 
 
 
 Social History
 
 
+--------------+-------+-----------+--------+------+
| Tobacco Use  | Types | Packs/Day | Years  | Date |
|              |       |           | Used   |      |
+--------------+-------+-----------+--------+------+
| Never Smoker |       |           |        |      |
+--------------+-------+-----------+--------+------+
 
 
 
+---------------------+---+---+---+
| Smokeless Tobacco:  |   |   |   |
| Never Used          |   |   |   |
+---------------------+---+---+---+
 
 
 
+---------------------------------------------------------------+
| Comments: some second hand smoke exposure, but fairly minimal |
+---------------------------------------------------------------+
 
 
 
+-------------+-------------+---------+----------+
| Alcohol Use | Drinks/Week | oz/Week | Comments |
+-------------+-------------+---------+----------+
| No          |             |         |          |
+-------------+-------------+---------+----------+
 
 
 
 
+------------------+---------------+
| Sex Assigned at  | Date Recorded |
| Birth            |               |
+------------------+---------------+
| Not on file      |               |
+------------------+---------------+
 
 
 
+----------------+-------------+-------------+
| Job Start Date | Occupation  | Industry    |
+----------------+-------------+-------------+
| Not on file    | Not on file | Not on file |
+----------------+-------------+-------------+
 
 
 
+----------------+--------------+------------+
| Travel History | Travel Start | Travel End |
+----------------+--------------+------------+
 
 
 
+-------------------------------------+
| No recent travel history available. |
+-------------------------------------+
 documented as of this encounter
 
 Plan of Treatment
 
 
+--------+-----------+------------+----------------------+-------------------+
| Date   | Type      | Specialty  | Care Team            | Description       |
+--------+-----------+------------+----------------------+-------------------+
| / | Office    | Cardiology |   Tonia Wilson,    |                   |
|    | Visit     |            | ARNJESSICA  [address]   |                   |
|        |           |            | [place]  |                   |
|        |           |            | [address]  [phone]  |                   |
|        |           |            |  [phone] (Fax)  |                   |
+--------+-----------+------------+----------------------+-------------------+
| / | Hospital  | Radiology  |   Popeye Townsend, |                   |
|    | Encounter |            |  MD  [address] |                   |
|        |           |            |  St.  [place],   |                   |
|        |           |            | [place] [address]             |                   |
|        |           |            | [phone]         |                   |
|        |           |            | [phone] (Fax)   |                   |
+--------+-----------+------------+----------------------+-------------------+
| / | Surgery   | Radiology  |   Popeye Townsend, | CV EP PPM SYSTEM  |
|    |           |            |  MD  [address] | IMPLANT           |
|        |           |            |  St.  [place],   |                   |
|        |           |            | [place] [address]             |                   |
|        |           |            | [phone]         |                   |
|        |           |            | [phone] (Fax)   |                   |
+--------+-----------+------------+----------------------+-------------------+
| 02/10/ | Clinical  | Cardiology |                      |                   |
|    | Support   |            |                      |                   |
+--------+-----------+------------+----------------------+-------------------+
| / | Office    | Cardiology |   Tonia Wilson,    |                   |
|    | Visit     |            | ARNP  [address]   |                   |
 
|        |           |            | St  [place], [place]  |                   |
|        |           |            | [address]  [phone]  |                   |
|        |           |            |  [phone] (Fax)  |                   |
+--------+-----------+------------+----------------------+-------------------+
| / | Off-Site  | Nephrology |   Marzena Moser  |                   |
| 2020   | Visit     |            | M,   [address] [place] [address]      |                   |
|        |           |            | [address]      |                   |
|        |           |            | [place]      |                   |
|        |           |            | [address]  [phone]  |                   |
|        |           |            |  [phone] (Fax)  |                   |
+--------+-----------+------------+----------------------+-------------------+
 documented as of this encounter
 
 Procedures
 
 
+----------------------+--------+------------+----------------------+----------------------+
| Procedure Name       | Priori | Date/Time  | Associated Diagnosis | Comments             |
|                      | ty     |            |                      |                      |
+----------------------+--------+------------+----------------------+----------------------+
| CMP14+LP+CBC/D/PLT+T | Routin | 07/10/2013 |                      |   Results for this   |
| SH+UA/M (NON ORD)    | e      |            |                      | procedure are in the |
|                      |        |            |                      |  results section.    |
+----------------------+--------+------------+----------------------+----------------------+
 documented in this encounter
 
 Results
 CMP14+LP+CBC/D/Plt+TSH+UA/M (07/10/2013)
 
+-------------+-----------+-----------------+------------+--------------+
| Component   | Value     | Ref Range       | Performed  | Pathologist  |
|             |           |                 | At         | Signature    |
+-------------+-----------+-----------------+------------+--------------+
| Na          | 143       | mmol/L          |            |              |
+-------------+-----------+-----------------+------------+--------------+
| K           | 4.8       | mmol/L          |            |              |
+-------------+-----------+-----------------+------------+--------------+
| Cl          | 115       | mmol/L          |            |              |
+-------------+-----------+-----------------+------------+--------------+
| CO2         | 19        | mmol/L          |            |              |
+-------------+-----------+-----------------+------------+--------------+
| BUN         | 69        | mg/dL           |            |              |
+-------------+-----------+-----------------+------------+--------------+
| CREA        | 1.7       | mg/dL           |            |              |
+-------------+-----------+-----------------+------------+--------------+
| Glucose     | 103       | mg/dL           |            |              |
+-------------+-----------+-----------------+------------+--------------+
| Calcium     | 9.0       | mg/dL           |            |              |
+-------------+-----------+-----------------+------------+--------------+
| Phosphorus  | 4.1       | 2.6 - 4.4 mg/dL |            |              |
+-------------+-----------+-----------------+------------+--------------+
| WBC         | 4.6       | K/uL            |            |              |
+-------------+-----------+-----------------+------------+--------------+
| Hemoglobin  | 11.9      | 11.3 - 15.5     |            |              |
|             |           | g/dL            |            |              |
+-------------+-----------+-----------------+------------+--------------+
| Hematocrit  | 35.7      | 34.0 - 46.0 %   |            |              |
+-------------+-----------+-----------------+------------+--------------+
| Platelet    | 145 (A)   | 150 - 400 K/uL  |            |              |
| Count       |           |                 |            |              |
 
+-------------+-----------+-----------------+------------+--------------+
| MCV         | 101.8 (A) | 80.0 - 98.0 fL  |            |              |
+-------------+-----------+-----------------+------------+--------------+
| Bilirubin   | 0.4       | 0.1 - 1.5 mg/dL |            |              |
| Total       |           |                 |            |              |
+-------------+-----------+-----------------+------------+--------------+
| AST         | 20        | 10 - 45 U/L     |            |              |
+-------------+-----------+-----------------+------------+--------------+
| ALT         | 14        | U/L             |            |              |
+-------------+-----------+-----------------+------------+--------------+
| Alkaline    | 80        | 35 - 115 U/L    |            |              |
| Phosphatase |           |                 |            |              |
+-------------+-----------+-----------------+------------+--------------+
| Albumin     | 3.6       | 3.3 - 4.8 g/dL  |            |              |
+-------------+-----------+-----------------+------------+--------------+
| Estimated   | 31.0      | mL/min/1.73m2   |            |              |
| GFR         |           |                 |            |              |
+-------------+-----------+-----------------+------------+--------------+
| Magnesium   | 2.1       | mg/dL           |            |              |
+-------------+-----------+-----------------+------------+--------------+
| Tacrolimus  | 6.3       |                 |            |              |
| Level       |           |                 |            |              |
+-------------+-----------+-----------------+------------+--------------+
| PTH INTACT  | 303.5     | pg/mL           |            |              |
+-------------+-----------+-----------------+------------+--------------+
| TSH         | 0.69      | uIU/mL          |            |              |
+-------------+-----------+-----------------+------------+--------------+
| BK Virus    | detected  |                 |            |              |
| DNA, Urine, |           |                 |            |              |
|  PCR        |           |                 |            |              |
+-------------+-----------+-----------------+------------+--------------+
 
 
 
+----------+
| Specimen |
+----------+
|          |
+----------+
 documented in this encounter
 
 Visit Diagnoses
 Not on filedocumented in this encounter

## 2020-01-08 NOTE — XMS
Encounter Summary
  Created on: 2020
 
 Viviana Ricci
 External Reference #: [id number]
 : 47
 Sex: Female
 
 Demographics
 
 
+-----------------------+----------------------+
| Address               | [address]      |
|                       | [place]  [address] |
+-----------------------+----------------------+
| Home Phone            | [phone]      |
+-----------------------+----------------------+
| Preferred Language    | Unknown              |
+-----------------------+----------------------+
| Marital Status        | Single               |
+-----------------------+----------------------+
| Restorationism Affiliation | [id number]                 |
+-----------------------+----------------------+
| Race                  | Unknown              |
+-----------------------+----------------------+
| Ethnic Group          | Unknown              |
+-----------------------+----------------------+
 
 
 Author
 
 
+--------------+--------------------------------------------+
| Author       | [facility]  |
|              | and [place]                                |
+--------------+--------------------------------------------+
| Organization | [facility]  |
|              | and [place]                                |
+--------------+--------------------------------------------+
| Address      | Unknown                                    |
+--------------+--------------------------------------------+
| Phone        | Unavailable                                |
+--------------+--------------------------------------------+
 
 
 
 Support
 
 
+----------------+--------------+---------------------+-----------------+
| Name           | Relationship | Address             | Phone           |
+----------------+--------------+---------------------+-----------------+
| Ada/Ed Radhames | ECON         | [address]              | [phone] |
|                |              | [place]  |                 |
|                |              |  [address]              |                 |
+----------------+--------------+---------------------+-----------------+
 
 
 
 
 Care Team Providers
 
 
+------------------------+------+-----------------+
| Care Team Member Name  | Role | Phone           |
+------------------------+------+-----------------+
| Marzena Moser DO | PCP  | [phone] |
+------------------------+------+-----------------+
 
 
 
 Encounter Details
 
 
+--------+-------------+---------------------+---------------------+----------------------+
| Date   | Type        | Department          | Care Team           | Description          |
+--------+-------------+---------------------+---------------------+----------------------+
| / | Orders Only |   [facility] [place]         |   Juju Glass,  | Type 2 diabetes      |
| 2018   |             | NEPHROLOGY  [address]   | RN                  | mellitus with        |
|        |             | [address]   |                     | chronic kidney       |
|        |             | [place], [place]     |                     | disease, without     |
|        |             | [address]          |                     | long-term current    |
|        |             | [phone]        |                     | use of insulin,      |
|        |             |                     |                     | unspecified CKD      |
|        |             |                     |                     | stage (HCC) (Primary |
|        |             |                     |                     |  Dx); Kidney         |
|        |             |                     |                     | replaced by          |
|        |             |                     |                     | transplant           |
+--------+-------------+---------------------+---------------------+----------------------+
 
 
 
 Social History
 
 
+--------------+-------+-----------+--------+------+
| Tobacco Use  | Types | Packs/Day | Years  | Date |
|              |       |           | Used   |      |
+--------------+-------+-----------+--------+------+
| Never Smoker |       |           |        |      |
+--------------+-------+-----------+--------+------+
 
 
 
+---------------------+---+---+---+
| Smokeless Tobacco:  |   |   |   |
| Never Used          |   |   |   |
+---------------------+---+---+---+
 
 
 
+---------------------------------------------------------------+
| Comments: some second hand smoke exposure, but fairly minimal |
+---------------------------------------------------------------+
 
 
 
+-------------+-------------+---------+----------+
| Alcohol Use | Drinks/Week | oz/Week | Comments |
 
+-------------+-------------+---------+----------+
| No          |             |         |          |
+-------------+-------------+---------+----------+
 
 
 
+------------------+---------------+
| Sex Assigned at  | Date Recorded |
| Birth            |               |
+------------------+---------------+
| Not on file      |               |
+------------------+---------------+
 
 
 
+----------------+-------------+-------------+
| Job Start Date | Occupation  | Industry    |
+----------------+-------------+-------------+
| Not on file    | Not on file | Not on file |
+----------------+-------------+-------------+
 
 
 
+----------------+--------------+------------+
| Travel History | Travel Start | Travel End |
+----------------+--------------+------------+
 
 
 
+-------------------------------------+
| No recent travel history available. |
+-------------------------------------+
 documented as of this encounter
 
 Plan of Treatment
 
 
+--------+-----------+------------+----------------------+-------------------+
| Date   | Type      | Specialty  | Care Team            | Description       |
+--------+-----------+------------+----------------------+-------------------+
| / | Office    | Cardiology |   Tonia Wilson,    |                   |
| 2020   | Visit     |            | BELKIS  [address]   |                   |
|        |           |            | St  [place], [place]  |                   |
|        |           |            | [address]  [phone]  |                   |
|        |           |            |  [phone] (Fax)  |                   |
+--------+-----------+------------+----------------------+-------------------+
| / | Hospital  | Radiology  |   Popeye Townsend, |                   |
|    | Encounter |            |  MD  [address] |                   |
|        |           |            |  St. [place],   |                   |
|        |           |            | [place] [address]             |                   |
|        |           |            | [phone]         |                   |
|        |           |            | [phone] (Fax)   |                   |
+--------+-----------+------------+----------------------+-------------------+
| / | Surgery   | Radiology  |   Popeye Townsend, | CV EP PPM SYSTEM  |
|    |           |            |  MD  [address] | IMPLANT           |
|        |           |            |  St. [place],   |                   |
|        |           |            | [place] [address]             |                   |
|        |           |            | [phone]         |                   |
|        |           |            | [phone] (Fax)   |                   |
+--------+-----------+------------+----------------------+-------------------+
 
| 02/10/ | Clinical  | Cardiology |                      |                   |
|    | Support   |            |                      |                   |
+--------+-----------+------------+----------------------+-------------------+
| / | Office    | Cardiology |   Tonia Wilson,    |                   |
|    | Visit     |            | [facility]  [address]   |                   |
|        |           |            |   [place]  |                   |
|        |           |            | [address]  [phone]  |                   |
|        |           |            |  [phone] (Fax)  |                   |
+--------+-----------+------------+----------------------+-------------------+
| / | Off-Site  | Nephrology |   Marzena Moser  |                   |
|    | Visit     |            | DO ETIENNE  [address] [place] [address]      |                   |
|        |           |            | [address]      |                   |
|        |           |            | [place]      |                   |
|        |           |            | [id number] [phone]  |                   |
|        |           |            |  [phone] (Fax)  |                   |
+--------+-----------+------------+----------------------+-------------------+
 documented as of this encounter
 
 Visit Diagnoses
 
 
+------------------------------------------------------------------------------------------+
| Diagnosis                                                                                |
+------------------------------------------------------------------------------------------+
|   Type 2 diabetes mellitus with chronic kidney disease, without long-term current use of |
|  insulin, unspecified CKD stage (HCC) - Primary                                          |
+------------------------------------------------------------------------------------------+
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
+------------------------------------------------------------------------------------------+
 documented in this encounter

## 2020-01-08 NOTE — XMS
Encounter Summary
  Created on: 2020
 
 Viviana Ricci
 External Reference #: [id number]
 : 47
 Sex: Female
 
 Demographics
 
 
+-----------------------+----------------------+
| Address               | [address]      |
|                       | [place]  [address] |
+-----------------------+----------------------+
| Home Phone            | [phone]      |
+-----------------------+----------------------+
| Preferred Language    | Unknown              |
+-----------------------+----------------------+
| Marital Status        | Single               |
+-----------------------+----------------------+
| Sabianism Affiliation | [id number]                 |
+-----------------------+----------------------+
| Race                  | Unknown              |
+-----------------------+----------------------+
| Ethnic Group          | Unknown              |
+-----------------------+----------------------+
 
 
 Author
 
 
+--------------+--------------------------------------------+
| Author       | [facility]  |
|              | and [facility]                                |
+--------------+--------------------------------------------+
| Organization | [facility]  |
|              | and [facility]                                |
+--------------+--------------------------------------------+
| Address      | Unknown                                    |
+--------------+--------------------------------------------+
| Phone        | Unavailable                                |
+--------------+--------------------------------------------+
 
 
 
 Support
 
 
+----------------+--------------+---------------------+-----------------+
| Name           | Relationship | Address             | Phone           |
+----------------+--------------+---------------------+-----------------+
| Ada/Ed Radhames | ECON         | [address]              | [phone] |
|                |              | [place]  |                 |
|                |              |  [id number]              |                 |
+----------------+--------------+---------------------+-----------------+
 
 
 
 
 Care Team Providers
 
 
+------------------------+------+-----------------+
| Care Team Member Name  | Role | Phone           |
+------------------------+------+-----------------+
| Marzena Moser DO | PCP  | [phone] |
+------------------------+------+-----------------+
 
 
 
 Reason for Visit
 
 
+-------------------+----------+
| Reason            | Comments |
+-------------------+----------+
| Medication Refill |          |
+-------------------+----------+
 
 
 
 Encounter Details
 
 
+--------+--------+---------------------+----------------------+-------------------+
| Date   | Type   | Department          | Care Team            | Description       |
+--------+--------+---------------------+----------------------+-------------------+
| / | Refill |   [facility]         |   Marzena Moser  | Medication Refill |
| 2018   |        | NEPHROLOGY  [address]   | M, DO  [address]      |                   |
|        |        | [address]   | [address]      |                   |
|        |        | [place], [place]     | [place], [place]      |                   |
|        |        | [address]          | [address]  [phone]  |                   |
|        |        | [phone]        |  [phone] (Fax)  |                   |
+--------+--------+---------------------+----------------------+-------------------+
 
 
 
 Social History
 
 
+--------------+-------+-----------+--------+------+
| Tobacco Use  | Types | Packs/Day | Years  | Date |
|              |       |           | Used   |      |
+--------------+-------+-----------+--------+------+
| Never Smoker |       |           |        |      |
+--------------+-------+-----------+--------+------+
 
 
 
+---------------------+---+---+---+
| Smokeless Tobacco:  |   |   |   |
| Never Used          |   |   |   |
+---------------------+---+---+---+
 
 
 
+---------------------------------------------------------------+
| Comments: some second hand smoke exposure, but fairly minimal |
 
+---------------------------------------------------------------+
 
 
 
+-------------+-------------+---------+----------+
| Alcohol Use | Drinks/Week | oz/Week | Comments |
+-------------+-------------+---------+----------+
| No          |             |         |          |
+-------------+-------------+---------+----------+
 
 
 
+------------------+---------------+
| Sex Assigned at  | Date Recorded |
| Birth            |               |
+------------------+---------------+
| Not on file      |               |
+------------------+---------------+
 
 
 
+----------------+-------------+-------------+
| Job Start Date | Occupation  | Industry    |
+----------------+-------------+-------------+
| Not on file    | Not on file | Not on file |
+----------------+-------------+-------------+
 
 
 
+----------------+--------------+------------+
| Travel History | Travel Start | Travel End |
+----------------+--------------+------------+
 
 
 
+-------------------------------------+
| No recent travel history available. |
+-------------------------------------+
 documented as of this encounter
 
 Plan of Treatment
 
 
+--------+-----------+------------+----------------------+-------------------+
| Date   | Type      | Specialty  | Care Team            | Description       |
+--------+-----------+------------+----------------------+-------------------+
| / | Office    | Cardiology |   Tonia Wilson,    |                   |
|    | Visit     |            | ARNP  [address]   |                   |
|        |           |            | [place], [place]  |                   |
|        |           |            | [address]  [phone]  |                   |
|        |           |            |  [phone] (Fax)  |                   |
+--------+-----------+------------+----------------------+-------------------+
| / | Hospital  | Radiology  |   Popeye Townsend, |                   |
|    | Encounter |            |  MD  [address] |                   |
|        |           |            |  [place],   |                   |
|        |           |            | [place] [address]             |                   |
|        |           |            | [phone]         |                   |
|        |           |            | [phone] (Fax)   |                   |
+--------+-----------+------------+----------------------+-------------------+
| / | Surgery   | Radiology  |   Popeye Townsend, | CV EP PPM SYSTEM  |
 
|    |           |            |  MD  [address] | IMPLANT           |
|        |           |            |  [place],   |                   |
|        |           |            | [place] [address]             |                   |
|        |           |            | [phone]         |                   |
|        |           |            | [phone] (Fax)   |                   |
+--------+-----------+------------+----------------------+-------------------+
| 02/10/ | Clinical  | Cardiology |                      |                   |
|    | Support   |            |                      |                   |
+--------+-----------+------------+----------------------+-------------------+
| / | Office    | Cardiology |   RakeshTonia andre,    |                   |
|    | Visit     |            | ARNP  [address]   |                   |
|        |           |            | [place]  |                   |
|        |           |            | [address] [phone]  |                   |
|        |           |            |  [phone] (Fax)  |                   |
+--------+-----------+------------+----------------------+-------------------+
| / | Off-Site  | Nephrology |   Marzena Moser  |                   |
|    | Visit     |            | DO [facility]  [address] [place] [address]      |                   |
|        |           |            | [address]      |                   |
|        |           |            | [place]      |                   |
|        |           |            | [address] [phone]  |                   |
|        |           |            |  [phone] (Fax)  |                   |
+--------+-----------+------------+----------------------+-------------------+
 documented as of this encounter
 
 Visit Diagnoses
 Not on filedocumented in this encounter

## 2020-01-08 NOTE — XMS
Encounter Summary
  Created on: 2020
 
 Viviana Ricci
 External Reference #: [id number]
 : 47
 Sex: Female
 
 Demographics
 
 
+-----------------------+----------------------+
| Address               | [address]      |
|                       | [place]  [address] |
+-----------------------+----------------------+
| Home Phone            | [phone]      |
+-----------------------+----------------------+
| Preferred Language    | Unknown              |
+-----------------------+----------------------+
| Marital Status        | Single               |
+-----------------------+----------------------+
| [employer]iliation | [id number]                 |
+-----------------------+----------------------+
| Race                  | Unknown              |
+-----------------------+----------------------+
| Ethnic Group          | Unknown              |
+-----------------------+----------------------+
 
 
 Author
 
 
+--------------+--------------------------------------------+
| Author       | [facility]  |
|              | and [facility]                                |
+--------------+--------------------------------------------+
| Organization | [facility]  |
|              | and [place]                                |
+--------------+--------------------------------------------+
| Address      | Unknown                                    |
+--------------+--------------------------------------------+
| Phone        | Unavailable                                |
+--------------+--------------------------------------------+
 
 
 
 Support
 
 
+----------------+--------------+---------------------+-----------------+
| Name           | Relationship | Address             | Phone           |
+----------------+--------------+---------------------+-----------------+
| Ada/Ed Radhames | ECON         | [address]              | [phone] |
|                |              | [place]  |                 |
|                |              |  [address]              |                 |
+----------------+--------------+---------------------+-----------------+
 
 
 
 
 Care Team Providers
 
 
+------------------------+------+-----------------+
| Care Team Member Name  | Role | Phone           |
+------------------------+------+-----------------+
| Marzena Moser DO | PCP  | [phone] |
+------------------------+------+-----------------+
 
 
 
 Encounter Details
 
 
+--------+----------+---------------------+----------------------+-------------+
| Date   | Type     | Department          | Care Team            | Description |
+--------+----------+---------------------+----------------------+-------------+
| / | Abstract |   [facility]         |   Marzena Moser  |             |
| 2019   |          | NEPHROLOGY  [address]   | [address]      |             |
|        |          | [address]   | [address]      |             |
|        |          | [place], [place]     | [place], [place]      |             |
|        |          | [address]          | [address]  [phone]  |             |
|        |          | [phone]        |  [phone] (Fax)  |             |
+--------+----------+---------------------+----------------------+-------------+
 
 
 
 Social History
 
 
+--------------+-------+-----------+--------+------+
| Tobacco Use  | Types | Packs/Day | Years  | Date |
|              |       |           | Used   |      |
+--------------+-------+-----------+--------+------+
| Never Smoker |       |           |        |      |
+--------------+-------+-----------+--------+------+
 
 
 
+---------------------+---+---+---+
| Smokeless Tobacco:  |   |   |   |
| Never Used          |   |   |   |
+---------------------+---+---+---+
 
 
 
+---------------------------------------------------------------+
| Comments: some second hand smoke exposure, but fairly minimal |
+---------------------------------------------------------------+
 
 
 
+-------------+-------------+---------+----------+
| Alcohol Use | Drinks/Week | oz/Week | Comments |
+-------------+-------------+---------+----------+
| No          |             |         |          |
+-------------+-------------+---------+----------+
 
 
 
 
+------------------+---------------+
| Sex Assigned at  | Date Recorded |
| Birth            |               |
+------------------+---------------+
| Not on file      |               |
+------------------+---------------+
 
 
 
+----------------+-------------+-------------+
| Job Start Date | Occupation  | Industry    |
+----------------+-------------+-------------+
| Not on file    | Not on file | Not on file |
+----------------+-------------+-------------+
 
 
 
+----------------+--------------+------------+
| Travel History | Travel Start | Travel End |
+----------------+--------------+------------+
 
 
 
+-------------------------------------+
| No recent travel history available. |
+-------------------------------------+
 documented as of this encounter
 
 Plan of Treatment
 
 
+--------+-----------+------------+----------------------+-------------------+
| Date   | Type      | Specialty  | Care Team            | Description       |
+--------+-----------+------------+----------------------+-------------------+
| / | Office    | Cardiology |   Tonia Wilson,    |                   |
|    | Visit     |            | ARNP  [address]   |                   |
|        |           |            | St  [facility] [facility] [place]  |                   |
|        |           |            | [address]  [phone]  |                   |
|        |           |            |  [phone] (Fax)  |                   |
+--------+-----------+------------+----------------------+-------------------+
| / | Hospital  | Radiology  |   Popeye Townsend, |                   |
|    | Encounter |            |  MD  [address] |                   |
|        |           |            |  St.  [place],   |                   |
|        |           |            | [place] [address]             |                   |
|        |           |            | [phone]         |                   |
|        |           |            | [phone] (Fax)   |                   |
+--------+-----------+------------+----------------------+-------------------+
| / | Surgery   | Radiology  |   Popeye Townsend, | CV EP PPM SYSTEM  |
|    |           |            |  MD  [address] | IMPLANT           |
|        |           |            |  St.  [place],   |                   |
|        |           |            | [place] [address]             |                   |
|        |           |            | [phone]         |                   |
|        |           |            | [phone] (Fax)   |                   |
+--------+-----------+------------+----------------------+-------------------+
| 02/10/ | Clinical  | Cardiology |                      |                   |
2020   | Support   |            |                      |                   |
+--------+-----------+------------+----------------------+-------------------+
| / | Office    | Cardiology |   Tonia Wilson,    |                   |
|    | Visit     |            | [facility]  [address]   |                   |
 
|        |           |            |   [place]  |                   |
|        |           |            | [address]  [phone]  |                   |
|        |           |            |  [phone] (Fax)  |                   |
+--------+-----------+------------+----------------------+-------------------+
| / | Off-Site  | Nephrology |   Marzena Moser  |                   |
2020   | Visit     |            | DO ETIENNE  [address]      |                   |
|        |           |            | [address]      |                   |
|        |           |            | [place]      |                   |
|        |           |            | [address]  [phone]  |                   |
|        |           |            |  [phone] (Fax)  |                   |
+--------+-----------+------------+----------------------+-------------------+
 documented as of this encounter
 
 Procedures
 
 
+----------------------+--------+------------+----------------------+----------------------+
| Procedure Name       | Priori | Date/Time  | Associated Diagnosis | Comments             |
|                      | ty     |            |                      |                      |
+----------------------+--------+------------+----------------------+----------------------+
| EXTERNAL LAB: ALMA    | Routin | 2019 |                      |   Results for this   |
|                      | e      |            |                      | procedure are in the |
|                      |        |            |                      |  results section.    |
+----------------------+--------+------------+----------------------+----------------------+
| EXTERNAL LAB:        | Routin | 2019 |                      |   Results for this   |
| GLUCOSE              | e      |            |                      | procedure are in the |
|                      |        |            |                      |  results section.    |
+----------------------+--------+------------+----------------------+----------------------+
| EXTERNAL LAB: ALT    | Routin | 2019 |                      |   Results for this   |
|                      | e      |            |                      | procedure are in the |
|                      |        |            |                      |  results section.    |
+----------------------+--------+------------+----------------------+----------------------+
| EXTERNAL LAB: ELOY    | Routin | 2019 |                      |   Results for this   |
|                      | e      |            |                      | procedure are in the |
|                      |        |            |                      |  results section.    |
+----------------------+--------+------------+----------------------+----------------------+
| EXTERNAL LAB:        | Routin | 2019 |                      |   Results for this   |
| ALKALINE PHOSPHATASE | e      |            |                      | procedure are in the |
|                      |        |            |                      |  results section.    |
+----------------------+--------+------------+----------------------+----------------------+
| EXTERNAL LAB:        | Routin | 2019 |                      |   Results for this   |
| BILIRUBIN, TOTAL     | e      |            |                      | procedure are in the |
|                      |        |            |                      |  results section.    |
+----------------------+--------+------------+----------------------+----------------------+
| EXTERNAL LAB:        | Routin | 2019 |                      |   Results for this   |
| ALBUMIN              | e      |            |                      | procedure are in the |
|                      |        |            |                      |  results section.    |
+----------------------+--------+------------+----------------------+----------------------+
| EXTERNAL LAB:        | Routin | 2019 |                      |   Results for this   |
| PROTEIN, TOTAL       | e      |            |                      | procedure are in the |
|                      |        |            |                      |  results section.    |
+----------------------+--------+------------+----------------------+----------------------+
| EXTERNAL LAB:        | Routin | 2019 |                      |   Results for this   |
| PHOSPHORUS           | e      |            |                      | procedure are in the |
|                      |        |            |                      |  results section.    |
+----------------------+--------+------------+----------------------+----------------------+
| EXTERNAL LAB:        | Routin | 2019 |                      |   Results for this   |
| MAGNESIUM            | e      |            |                      | procedure are in the |
|                      |        |            |                      |  results section.    |
+----------------------+--------+------------+----------------------+----------------------+
 
| EXTERNAL LAB:        | Routin | 2019 |                      |   Results for this   |
| CALCIUM              | e      |            |                      | procedure are in the |
|                      |        |            |                      |  results section.    |
+----------------------+--------+------------+----------------------+----------------------+
| EXTERNAL LAB: CARBON | Routin | 2019 |                      |   Results for this   |
|  DIOXIDE             | e      |            |                      | procedure are in the |
|                      |        |            |                      |  results section.    |
+----------------------+--------+------------+----------------------+----------------------+
| EXTERNAL LAB:        | Routin | 2019 |                      |   Results for this   |
| CHLORIDE             | e      |            |                      | procedure are in the |
|                      |        |            |                      |  results section.    |
+----------------------+--------+------------+----------------------+----------------------+
| EXTERNAL LAB:        | Routin | 2019 |                      |   Results for this   |
| POTASSIUM            | e      |            |                      | procedure are in the |
|                      |        |            |                      |  results section.    |
+----------------------+--------+------------+----------------------+----------------------+
| EXTERNAL LAB: SODIUM | Routin | 2019 |                      |   Results for this   |
|                      | e      |            |                      | procedure are in the |
|                      |        |            |                      |  results section.    |
+----------------------+--------+------------+----------------------+----------------------+
| EXTERNAL LAB:        | Routin | 2019 |                      |   Results for this   |
| VITAMIN D,           | e      |            |                      | procedure are in the |
| 25-HYDROXY           |        |            |                      |  results section.    |
+----------------------+--------+------------+----------------------+----------------------+
| EXTERNAL LAB: CBC    | Routin | 2019 |                      |   Results for this   |
|                      | e      |            |                      | procedure are in the |
|                      |        |            |                      |  results section.    |
+----------------------+--------+------------+----------------------+----------------------+
| TACROLIMUS TROUGH    | Routin | 2019 |                      |   Results for this   |
| LC-MS/MS             | e      |            |                      | procedure are in the |
|                      |        |            |                      |  results section.    |
+----------------------+--------+------------+----------------------+----------------------+
| EXTERNAL LAB: TSH    | Routin | 2019 |                      |   Results for this   |
|                      | e      |            |                      | procedure are in the |
|                      |        |            |                      |  results section.    |
+----------------------+--------+------------+----------------------+----------------------+
| EXTERNAL LAB: EGFR   | Routin | 2019 |                      |   Results for this   |
|                      | e      |            |                      | procedure are in the |
|                      |        |            |                      |  results section.    |
+----------------------+--------+------------+----------------------+----------------------+
| EXTERNAL LAB:        | Routin | 2019 |                      |   Results for this   |
| CREATININE           | e      |            |                      | procedure are in the |
|                      |        |            |                      |  results section.    |
+----------------------+--------+------------+----------------------+----------------------+
| HEMOGLOBIN A1C       | Routin | 2019 |                      |   Results for this   |
|                      | e      |            |                      | procedure are in the |
|                      |        |            |                      |  results section.    |
+----------------------+--------+------------+----------------------+----------------------+
 documented in this encounter
 
 Results
 Tacrolimus Trough, LC-MS/MS (2019)
 
+-------------+-------+-----------+------------+--------------+
| Component   | Value | Ref Range | Performed  | Pathologist  |
|             |       |           | At         | Signature    |
+-------------+-------+-----------+------------+--------------+
| Tacrolimus, | 6.5   |           |            |              |
|  CMIA,      |       |           |            |              |
| External    |       |           |            |              |
 
+-------------+-------+-----------+------------+--------------+
 
 
 
+----------+
| Specimen |
+----------+
| Blood    |
+----------+
 Hemoglobin A1C (2019)
 
+-------------+-------+-----------+------------+--------------+
| Component   | Value | Ref Range | Performed  | Pathologist  |
|             |       |           | At         | Signature    |
+-------------+-------+-----------+------------+--------------+
| Hemoglobin  | 7.2   | %         | EXTERNAL   |              |
| A1c         |       |           | LAB        |              |
+-------------+-------+-----------+------------+--------------+
 
 
 
+----------+
| Specimen |
+----------+
| Blood    |
+----------+
 
 
 
+--------------------------+
| Resulting Agency Comment |
+--------------------------+
|   [facility]              |
+--------------------------+
 
 
 
+----------------+---------+--------------------+--------------+
| Performing     | Address | City/State/Zipcode | Phone Number |
| Organization   |         |                    |              |
+----------------+---------+--------------------+--------------+
|   EXTERNAL LAB |         |                    |              |
+----------------+---------+--------------------+--------------+
 External Lab: ALMA (2019)
 
+-----------+--------+-----------+------------+--------------+
| Component | Value  | Ref Range | Performed  | Pathologist  |
|           |        |           | At         | Signature    |
+-----------+--------+-----------+------------+--------------+
| BUN,      | 44 (A) | 6 - 23    | EXTERNAL   |              |
| External  |        |           | LAB        |              |
+-----------+--------+-----------+------------+--------------+
 
 
 
+--------------------------+
| Resulting Agency Comment |
+--------------------------+
|   [facility]              |
+--------------------------+
 
 
 
 
+----------------+---------+--------------------+--------------+
| Performing     | Address | City/State/Zipcode | Phone Number |
| Organization   |         |                    |              |
+----------------+---------+--------------------+--------------+
|   EXTERNAL LAB |         |                    |              |
+----------------+---------+--------------------+--------------+
 External Lab: Glucose (2019)
 
+-----------+---------+-----------+------------+--------------+
| Component | Value   | Ref Range | Performed  | Pathologist  |
|           |         |           | At         | Signature    |
+-----------+---------+-----------+------------+--------------+
| Glucose,  | 129 (A) | 70 - 100  | EXTERNAL   |              |
| External  |         |           | LAB        |              |
+-----------+---------+-----------+------------+--------------+
 
 
 
+--------------------------+
| Resulting Agency Comment |
+--------------------------+
|   [facility]              |
+--------------------------+
 
 
 
+----------------+---------+--------------------+--------------+
| Performing     | Address | City/State/Zipcode | Phone Number |
| Organization   |         |                    |              |
+----------------+---------+--------------------+--------------+
|   EXTERNAL LAB |         |                    |              |
+----------------+---------+--------------------+--------------+
 External Lab: ALT (2019)
 
+-----------+-------+-----------+------------+--------------+
| Component | Value | Ref Range | Performed  | Pathologist  |
|           |       |           | At         | Signature    |
+-----------+-------+-----------+------------+--------------+
| ALT,      | 27    | 7 - 52    | EXTERNAL   |              |
| External  |       |           | LAB        |              |
+-----------+-------+-----------+------------+--------------+
 
 
 
+--------------------------+
| Resulting Agency Comment |
+--------------------------+
|   [facility]              |
+--------------------------+
 
 
 
+----------------+---------+--------------------+--------------+
| Performing     | Address | City/State/Zipcode | Phone Number |
| Organization   |         |                    |              |
+----------------+---------+--------------------+--------------+
|   EXTERNAL LAB |         |                    |              |
 
+----------------+---------+--------------------+--------------+
 External Lab: AST (2019)
 
+-----------+-------+-----------+------------+--------------+
| Component | Value | Ref Range | Performed  | Pathologist  |
|           |       |           | At         | Signature    |
+-----------+-------+-----------+------------+--------------+
| AST,      | 32    | 13 - 39   | EXTERNAL   |              |
| External  |       |           | LAB        |              |
+-----------+-------+-----------+------------+--------------+
 
 
 
+--------------------------+
| Resulting Agency Comment |
+--------------------------+
|   [facility]              |
+--------------------------+
 
 
 
+----------------+---------+--------------------+--------------+
| Performing     | Address | City/State/Zipcode | Phone Number |
| Organization   |         |                    |              |
+----------------+---------+--------------------+--------------+
|   EXTERNAL LAB |         |                    |              |
+----------------+---------+--------------------+--------------+
 External Lab: Alkaline Phosphatase (2019)
 
+-----------+-------+-----------+------------+--------------+
| Component | Value | Ref Range | Performed  | Pathologist  |
|           |       |           | At         | Signature    |
+-----------+-------+-----------+------------+--------------+
| ALP,      | 90    | 31 - 130  | EXTERNAL   |              |
| External  |       |           | LAB        |              |
+-----------+-------+-----------+------------+--------------+
 
 
 
+--------------------------+
| Resulting Agency Comment |
+--------------------------+
|   [facility]              |
+--------------------------+
 
 
 
+----------------+---------+--------------------+--------------+
| Performing     | Address | City/State/Zipcode | Phone Number |
| Organization   |         |                    |              |
+----------------+---------+--------------------+--------------+
|   EXTERNAL LAB |         |                    |              |
+----------------+---------+--------------------+--------------+
 External Lab: Bilirubin, Total (2019)
 
+-------------+-------+-----------+------------+--------------+
| Component   | Value | Ref Range | Performed  | Pathologist  |
|             |       |           | At         | Signature    |
+-------------+-------+-----------+------------+--------------+
| Bilirubin,  | 0.7   | 0 - 1.2   | EXTERNAL   |              |
 
| Total,      |       |           | LAB        |              |
| External    |       |           |            |              |
+-------------+-------+-----------+------------+--------------+
 
 
 
+--------------------------+
| Resulting Agency Comment |
+--------------------------+
|   [facility]              |
+--------------------------+
 
 
 
+----------------+---------+--------------------+--------------+
| Performing     | Address | City/State/Zipcode | Phone Number |
| Organization   |         |                    |              |
+----------------+---------+--------------------+--------------+
|   EXTERNAL LAB |         |                    |              |
+----------------+---------+--------------------+--------------+
 External Lab: Albumin (2019)
 
+-----------+-------+-----------+------------+--------------+
| Component | Value | Ref Range | Performed  | Pathologist  |
|           |       |           | At         | Signature    |
+-----------+-------+-----------+------------+--------------+
| Albumin,  | 3.5   | 3.5 - 5   | EXTERNAL   |              |
| External  |       |           | LAB        |              |
+-----------+-------+-----------+------------+--------------+
 
 
 
+--------------------------+
| Resulting Agency Comment |
+--------------------------+
|   [facility]              |
+--------------------------+
 
 
 
+----------------+---------+--------------------+--------------+
| Performing     | Address | City/State/Zipcode | Phone Number |
| Organization   |         |                    |              |
+----------------+---------+--------------------+--------------+
|   EXTERNAL LAB |         |                    |              |
+----------------+---------+--------------------+--------------+
 External Lab: Protein, Total (2019)
 
+-----------+-------+-----------+------------+--------------+
| Component | Value | Ref Range | Performed  | Pathologist  |
|           |       |           | At         | Signature    |
+-----------+-------+-----------+------------+--------------+
| Protein,  | 6     | 6 - 8.3   | EXTERNAL   |              |
| Total,    |       |           | LAB        |              |
| External  |       |           |            |              |
+-----------+-------+-----------+------------+--------------+
 
 
 
+--------------------------+
 
| Resulting Agency Comment |
+--------------------------+
|   [facility]              |
+--------------------------+
 
 
 
+----------------+---------+--------------------+--------------+
| Performing     | Address | City/State/Zipcode | Phone Number |
| Organization   |         |                    |              |
+----------------+---------+--------------------+--------------+
|   EXTERNAL LAB |         |                    |              |
+----------------+---------+--------------------+--------------+
 External Lab: Phosphorus (2019)
 
+-------------+-------+-----------+------------+--------------+
| Component   | Value | Ref Range | Performed  | Pathologist  |
|             |       |           | At         | Signature    |
+-------------+-------+-----------+------------+--------------+
| Phosphorus, | 2.9   | 2.5 - 5   | EXTERNAL   |              |
|  External   |       |           | LAB        |              |
+-------------+-------+-----------+------------+--------------+
 
 
 
+--------------------------+
| Resulting Agency Comment |
+--------------------------+
|   [facility]              |
+--------------------------+
 
 
 
+----------------+---------+--------------------+--------------+
| Performing     | Address | City/State/Zipcode | Phone Number |
| Organization   |         |                    |              |
+----------------+---------+--------------------+--------------+
|   EXTERNAL LAB |         |                    |              |
+----------------+---------+--------------------+--------------+
 External Lab: Magnesium (2019)
 
+-------------+---------+-----------+------------+--------------+
| Component   | Value   | Ref Range | Performed  | Pathologist  |
|             |         |           | At         | Signature    |
+-------------+---------+-----------+------------+--------------+
| Magnesium,  | 1.6 (A) | 1.7 - 2.5 | EXTERNAL   |              |
| External    |         |           | LAB        |              |
+-------------+---------+-----------+------------+--------------+
 
 
 
+--------------------------+
| Resulting Agency Comment |
+--------------------------+
|   [facility]              |
+--------------------------+
 
 
 
+----------------+---------+--------------------+--------------+
 
| Performing     | Address | City/State/Zipcode | Phone Number |
| Organization   |         |                    |              |
+----------------+---------+--------------------+--------------+
|   EXTERNAL LAB |         |                    |              |
+----------------+---------+--------------------+--------------+
 External Lab: Calcium (2019)
 
+-----------+-------+------------+------------+--------------+
| Component | Value | Ref Range  | Performed  | Pathologist  |
|           |       |            | At         | Signature    |
+-----------+-------+------------+------------+--------------+
| Calcium,  | 9.7   | 8.2 - 10.3 | EXTERNAL   |              |
| External  |       |            | LAB        |              |
+-----------+-------+------------+------------+--------------+
 
 
 
+--------------------------+
| Resulting Agency Comment |
+--------------------------+
|   [facility]              |
+--------------------------+
 
 
 
+----------------+---------+--------------------+--------------+
| Performing     | Address | City/State/Zipcode | Phone Number |
| Organization   |         |                    |              |
+----------------+---------+--------------------+--------------+
|   EXTERNAL LAB |         |                    |              |
+----------------+---------+--------------------+--------------+
 External Lab: Carbon Dioxide (2019)
 
+-----------+-------+-----------+------------+--------------+
| Component | Value | Ref Range | Performed  | Pathologist  |
|           |       |           | At         | Signature    |
+-----------+-------+-----------+------------+--------------+
| Carbon    | 19    | 19 - 31   | EXTERNAL   |              |
| Dioxide,  |       |           | LAB        |              |
| External  |       |           |            |              |
+-----------+-------+-----------+------------+--------------+
 
 
 
+--------------------------+
| Resulting Agency Comment |
+--------------------------+
|   [facility]              |
+--------------------------+
 
 
 
+----------------+---------+--------------------+--------------+
| Performing     | Address | City/State/Zipcode | Phone Number |
| Organization   |         |                    |              |
+----------------+---------+--------------------+--------------+
|   EXTERNAL LAB |         |                    |              |
+----------------+---------+--------------------+--------------+
 External Lab: Chloride (2019)
 
 
+------------+-------+-----------+------------+--------------+
| Component  | Value | Ref Range | Performed  | Pathologist  |
|            |       |           | At         | Signature    |
+------------+-------+-----------+------------+--------------+
| Chloride,  | 105   | 95 - 112  | EXTERNAL   |              |
| External   |       |           | LAB        |              |
+------------+-------+-----------+------------+--------------+
 
 
 
+--------------------------+
| Resulting Agency Comment |
+--------------------------+
|   [facility]              |
+--------------------------+
 
 
 
+----------------+---------+--------------------+--------------+
| Performing     | Address | City/State/Zipcode | Phone Number |
| Organization   |         |                    |              |
+----------------+---------+--------------------+--------------+
|   EXTERNAL LAB |         |                    |              |
+----------------+---------+--------------------+--------------+
 External Lab: Potassium (2019)
 
+-------------+-------+-----------+------------+--------------+
| Component   | Value | Ref Range | Performed  | Pathologist  |
|             |       |           | At         | Signature    |
+-------------+-------+-----------+------------+--------------+
| Potassium,  | 4.5   | 3.6 - 5.1 | EXTERNAL   |              |
| External    |       |           | LAB        |              |
+-------------+-------+-----------+------------+--------------+
 
 
 
+--------------------------+
| Resulting Agency Comment |
+--------------------------+
|   [facility]              |
+--------------------------+
 
 
 
+----------------+---------+--------------------+--------------+
| Performing     | Address | City/State/Zipcode | Phone Number |
| Organization   |         |                    |              |
+----------------+---------+--------------------+--------------+
|   EXTERNAL LAB |         |                    |              |
+----------------+---------+--------------------+--------------+
 External Lab: Sodium (2019)
 
+-----------+-------+-----------+------------+--------------+
| Component | Value | Ref Range | Performed  | Pathologist  |
|           |       |           | At         | Signature    |
+-----------+-------+-----------+------------+--------------+
| Sodium,   | 141   | 132 - 143 | EXTERNAL   |              |
| External  |       |           | LAB        |              |
+-----------+-------+-----------+------------+--------------+
 
 
 
 
+--------------------------+
| Resulting Agency Comment |
+--------------------------+
|   [facility]              |
+--------------------------+
 
 
 
+----------------+---------+--------------------+--------------+
| Performing     | Address | City/State/Zipcode | Phone Number |
| Organization   |         |                    |              |
+----------------+---------+--------------------+--------------+
|   EXTERNAL LAB |         |                    |              |
+----------------+---------+--------------------+--------------+
 External Lab: Vitamin D, 25-Hydroxy (2019)
 
+-------------+-------+-----------+------------+--------------+
| Component   | Value | Ref Range | Performed  | Pathologist  |
|             |       |           | At         | Signature    |
+-------------+-------+-----------+------------+--------------+
| Vitamin D,  | 45    | 30 - 100  | EXTERNAL   |              |
| 25-Hydroxy, |       |           | LAB        |              |
|  External   |       |           |            |              |
+-------------+-------+-----------+------------+--------------+
 
 
 
+----------+
| Specimen |
+----------+
| Blood    |
+----------+
 
 
 
+--------------------------+
| Resulting Agency Comment |
+--------------------------+
|   [facility]              |
+--------------------------+
 
 
 
+----------------+---------+--------------------+--------------+
| Performing     | Address | City/State/Zipcode | Phone Number |
| Organization   |         |                    |              |
+----------------+---------+--------------------+--------------+
|   EXTERNAL LAB |         |                    |              |
+----------------+---------+--------------------+--------------+
 External Lab: CBC (2019)
 
+-----------+----------+-----------+------------+--------------+
| Component | Value    | Ref Range | Performed  | Pathologist  |
|           |          |           | At         | Signature    |
+-----------+----------+-----------+------------+--------------+
| WBC,      | 5.4      | 4.5 - 11  | EXTERNAL   |              |
| External  |          |           | LAB        |              |
+-----------+----------+-----------+------------+--------------+
 
| HGB,      | 13       | 12 - 16   | EXTERNAL   |              |
| External  |          |           | LAB        |              |
+-----------+----------+-----------+------------+--------------+
| HCT,      | 39       | 35 - 45   | EXTERNAL   |              |
| External  |          |           | LAB        |              |
+-----------+----------+-----------+------------+--------------+
| PLT,      | 160      | 140 - 440 | EXTERNAL   |              |
| External  |          |           | LAB        |              |
+-----------+----------+-----------+------------+--------------+
| RBC,      | 3.75 (A) | 3.8 - 5.1 | EXTERNAL   |              |
| External  |          |           | LAB        |              |
+-----------+----------+-----------+------------+--------------+
| MCV,      | 104 (A)  | 81 - 99   | EXTERNAL   |              |
| External  |          |           | LAB        |              |
+-----------+----------+-----------+------------+--------------+
| RDW,      | 35 (A)   | 27 - 33   | EXTERNAL   |              |
| External  |          |           | LAB        |              |
+-----------+----------+-----------+------------+--------------+
 
 
 
+--------------------------+
| Resulting Agency Comment |
+--------------------------+
|   [facility]              |
+--------------------------+
 
 
 
+----------------+---------+--------------------+--------------+
| Performing     | Address | City/State/Zipcode | Phone Number |
| Organization   |         |                    |              |
+----------------+---------+--------------------+--------------+
|   EXTERNAL LAB |         |                    |              |
+----------------+---------+--------------------+--------------+
 External Lab: TSH (2019)
 
+-----------+-------+------------+------------+--------------+
| Component | Value | Ref Range  | Performed  | Pathologist  |
|           |       |            | At         | Signature    |
+-----------+-------+------------+------------+--------------+
| TSH,      | 1.49  | 0.27 - 4.2 | EXTERNAL   |              |
| External  |       |            | LAB        |              |
+-----------+-------+------------+------------+--------------+
 
 
 
+----------+
| Specimen |
+----------+
| Blood    |
+----------+
 
 
 
+--------------------------+
| Resulting Agency Comment |
+--------------------------+
|   [facility]              |
+--------------------------+
 
 
 
 
+----------------+---------+--------------------+--------------+
| Performing     | Address | City/State/Zipcode | Phone Number |
| Organization   |         |                    |              |
+----------------+---------+--------------------+--------------+
|   EXTERNAL LAB |         |                    |              |
+----------------+---------+--------------------+--------------+
 External Lab: eGFR (2019)
 
+-----------+-------+-----------+------------+--------------+
| Component | Value | Ref Range | Performed  | Pathologist  |
|           |       |           | At         | Signature    |
+-----------+-------+-----------+------------+--------------+
| eGFR,     | 36    |           | EXTERNAL   |              |
| External  |       |           | LAB        |              |
+-----------+-------+-----------+------------+--------------+
 
 
 
+----------+
| Specimen |
+----------+
| Blood    |
+----------+
 
 
 
+--------------------------+
| Resulting Agency Comment |
+--------------------------+
|   [facility]              |
+--------------------------+
 
 
 
+----------------+---------+--------------------+--------------+
| Performing     | Address | City/State/Zipcode | Phone Number |
| Organization   |         |                    |              |
+----------------+---------+--------------------+--------------+
|   EXTERNAL LAB |         |                    |              |
+----------------+---------+--------------------+--------------+
 External Lab: Creatinine (2019)
 
+-------------+----------+------------+------------+--------------+
| Component   | Value    | Ref Range  | Performed  | Pathologist  |
|             |          |            | At         | Signature    |
+-------------+----------+------------+------------+--------------+
| Creatinine, | 1.42 (A) | 0.7 - 1.18 | EXTERNAL   |              |
|  External   |          |            | LAB        |              |
+-------------+----------+------------+------------+--------------+
 
 
 
+----------+
| Specimen |
+----------+
| Blood    |
+----------+
 
 
 
 
+--------------------------+
| Resulting Agency Comment |
+--------------------------+
|   [facility]              |
+--------------------------+
 
 
 
+----------------+---------+--------------------+--------------+
| Performing     | Address | City/State/Zipcode | Phone Number |
| Organization   |         |                    |              |
+----------------+---------+--------------------+--------------+
|   EXTERNAL LAB |         |                    |              |
+----------------+---------+--------------------+--------------+
 documented in this encounter
 
 Visit Diagnoses
 Not on filedocumented in this encounter

## 2020-01-08 NOTE — XMS
Encounter Summary
  Created on: 2020
 
 Viviana Ricci
 External Reference #: [id number]
 : 47
 Sex: Female
 
 Demographics
 
 
+-----------------------+----------------------+
| Address               | [address]      |
|                       | [place]  [address] |
+-----------------------+----------------------+
| Home Phone            | [phone]      |
+-----------------------+----------------------+
| Preferred Language    | Unknown              |
+-----------------------+----------------------+
| Marital Status        | Single               |
+-----------------------+----------------------+
| Spiritism Affiliation | [id number]                 |
+-----------------------+----------------------+
| Race                  | Unknown              |
+-----------------------+----------------------+
| Ethnic Group          | Unknown              |
+-----------------------+----------------------+
 
 
 Author
 
 
+--------------+--------------------------------------------+
| Author       | [facility]  |
|              | and [place]                                |
+--------------+--------------------------------------------+
| Organization | [facility]  |
|              | and [place]                                |
+--------------+--------------------------------------------+
| Address      | Unknown                                    |
+--------------+--------------------------------------------+
| Phone        | Unavailable                                |
+--------------+--------------------------------------------+
 
 
 
 Support
 
 
+----------------+--------------+---------------------+-----------------+
| Name           | Relationship | Address             | Phone           |
+----------------+--------------+---------------------+-----------------+
| Ada/Ed Radhames | ECON         | [address]              | [phone] |
|                |              | [place]  |                 |
|                |              |  [address]              |                 |
+----------------+--------------+---------------------+-----------------+
 
 
 
 
 Care Team Providers
 
 
+-----------------------+------+-------------+
| Care Team Member Name | Role | Phone       |
+-----------------------+------+-------------+
 PCP  | Unavailable |
+-----------------------+------+-------------+
 
 
 
 Encounter Details
 
 
+--------+----------+---------------------+----------------------+-------------+
| Date   | Type     | Department          | Care Team            | Description |
+--------+----------+---------------------+----------------------+-------------+
| / | Abstract |   [facility]  [place]         |   Marzena Moser  |             |
|    |          | NEPHROLOGY  [address]   | M, DO  [address]      |             |
|        |          | [address]   | [address]      |             |
|        |          | [place], [place]     | [place]      |             |
|        |          | [address]          | [address]  [phone]  |             |
|        |          | [phone]        |  [phone] (Fax)  |             |
+--------+----------+---------------------+----------------------+-------------+
 
 
 
 Social History
 
 
+--------------+-------+-----------+--------+------+
| Tobacco Use  | Types | Packs/Day | Years  | Date |
|              |       |           | Used   |      |
+--------------+-------+-----------+--------+------+
| Never Smoker |       |           |        |      |
+--------------+-------+-----------+--------+------+
 
 
 
+---------------------+---+---+---+
| Smokeless Tobacco:  |   |   |   |
| Never Used          |   |   |   |
+---------------------+---+---+---+
 
 
 
+---------------------------------------------------------------+
| Comments: some second hand smoke exposure, but fairly minimal |
+---------------------------------------------------------------+
 
 
 
+-------------+-------------+---------+----------+
| Alcohol Use | Drinks/Week | oz/Week | Comments |
+-------------+-------------+---------+----------+
| No          |             |         |          |
+-------------+-------------+---------+----------+
 
 
 
 
+------------------+---------------+
| Sex Assigned at  | Date Recorded |
| Birth            |               |
+------------------+---------------+
| Not on file      |               |
+------------------+---------------+
 
 
 
+----------------+-------------+-------------+
| Job Start Date | Occupation  | Industry    |
+----------------+-------------+-------------+
| Not on file    | Not on file | Not on file |
+----------------+-------------+-------------+
 
 
 
+----------------+--------------+------------+
| Travel History | Travel Start | Travel End |
+----------------+--------------+------------+
 
 
 
+-------------------------------------+
| No recent travel history available. |
+-------------------------------------+
 documented as of this encounter
 
 Plan of Treatment
 
 
+--------+-----------+------------+----------------------+-------------------+
| Date   | Type      | Specialty  | Care Team            | Description       |
+--------+-----------+------------+----------------------+-------------------+
| / | Office    | Cardiology |   Tonia Wilson,    |                   |
|    | Visit     |            | ARNJESSICA  [address]   |                   |
|        |           |            | [place]  |                   |
|        |           |            | [address]  [phone]  |                   |
|        |           |            |  [phone] (Fax)  |                   |
+--------+-----------+------------+----------------------+-------------------+
| / | Hospital  | Radiology  |   Popeye Townsend, |                   |
|    | Encounter |            |  MD  [address] |                   |
|        |           |            |  St.  [place],   |                   |
|        |           |            | [place] [address]             |                   |
|        |           |            | [phone]         |                   |
|        |           |            | [phone] (Fax)   |                   |
+--------+-----------+------------+----------------------+-------------------+
| / | Surgery   | Radiology  |   Popeye Townsend, | CV EP PPM SYSTEM  |
|    |           |            |  MD  [address] | IMPLANT           |
|        |           |            |  St.  [place],   |                   |
|        |           |            | [place] [address]             |                   |
|        |           |            | [phone]         |                   |
|        |           |            | [phone] (Fax)   |                   |
+--------+-----------+------------+----------------------+-------------------+
| 02/10/ | Clinical  | Cardiology |                      |                   |
|    | Support   |            |                      |                   |
+--------+-----------+------------+----------------------+-------------------+
| / | Office    | Cardiology |   Tonia Wilson,    |                   |
|    | Visit     |            | ARNP  [address]   |                   |
 
|        |           |            | St  [place], [place]  |                   |
|        |           |            | [address] [phone]  |                   |
|        |           |            |  [phone] (Fax)  |                   |
+--------+-----------+------------+----------------------+-------------------+
| / | Off-Site  | Nephrology |   Marzena Moser  |                   |
| 2020   | Visit     |            | DO ETIENNE  [address] [place] [address]      |                   |
|        |           |            | [address]      |                   |
|        |           |            | [place]      |                   |
|        |           |            | [id number] [phone]  |                   |
|        |           |            |  [phone] (Fax)  |                   |
+--------+-----------+------------+----------------------+-------------------+
 documented as of this encounter
 
 Visit Diagnoses
 Not on filedocumented in this encounter

## 2020-01-08 NOTE — XMS
Encounter Summary
  Created on: 2020
 
 Viviana Ricci
 External Reference #: [id number]
 : 47
 Sex: Female
 
 Demographics
 
 
+-----------------------+----------------------+
| Address               | [address]      |
|                       | [place]  [address] |
+-----------------------+----------------------+
| Home Phone            | [phone]      |
+-----------------------+----------------------+
| Preferred Language    | Unknown              |
+-----------------------+----------------------+
| Marital Status        | Single               |
+-----------------------+----------------------+
| Temple Affiliation | [id number]                 |
+-----------------------+----------------------+
| Race                  | Unknown              |
+-----------------------+----------------------+
| Ethnic Group          | Unknown              |
+-----------------------+----------------------+
 
 
 Author
 
 
+--------------+--------------------------------------------+
| Author       | [facility]  |
|              | and [place]                                |
+--------------+--------------------------------------------+
| Organization | [facility]  |
|              | and [place]                                |
+--------------+--------------------------------------------+
| Address      | Unknown                                    |
+--------------+--------------------------------------------+
| Phone        | Unavailable                                |
+--------------+--------------------------------------------+
 
 
 
 Support
 
 
+----------------+--------------+---------------------+-----------------+
| Name           | Relationship | Address             | Phone           |
+----------------+--------------+---------------------+-----------------+
| Ada/Ed Radhames | ECON         | [address]              | [phone] |
|                |              | [place]  |                 |
|                |              |  [address]              |                 |
+----------------+--------------+---------------------+-----------------+
 
 
 
 
 Care Team Providers
 
 
+-----------------------+------+-------------+
| Care Team Member Name | Role | Phone       |
+-----------------------+------+-------------+
 PCP  | Unavailable |
+-----------------------+------+-------------+
 
 
 
 Encounter Details
 
 
+--------+----------+---------------------+----------------------+-------------+
| Date   | Type     | Department          | Care Team            | Description |
+--------+----------+---------------------+----------------------+-------------+
| / | Abstract |   [facility] [place]         |   Marzena Moser  |             |
|    |          | NEPHROLOGY  [address]   | M, DO  [address]      |             |
|        |          | [address]   | [address]      |             |
|        |          | [place], [place]     | [place]      |             |
|        |          | [address]          | [address]  [phone]  |             |
|        |          | [phone]        |  [phone] (Fax)  |             |
+--------+----------+---------------------+----------------------+-------------+
 
 
 
 Social History
 
 
+--------------+-------+-----------+--------+------+
| Tobacco Use  | Types | Packs/Day | Years  | Date |
|              |       |           | Used   |      |
+--------------+-------+-----------+--------+------+
| Never Smoker |       |           |        |      |
+--------------+-------+-----------+--------+------+
 
 
 
+---------------------+---+---+---+
| Smokeless Tobacco:  |   |   |   |
| Never Used          |   |   |   |
+---------------------+---+---+---+
 
 
 
+---------------------------------------------------------------+
| Comments: some second hand smoke exposure, but fairly minimal |
+---------------------------------------------------------------+
 
 
 
+-------------+-------------+---------+----------+
| Alcohol Use | Drinks/Week | oz/Week | Comments |
+-------------+-------------+---------+----------+
| No          |             |         |          |
+-------------+-------------+---------+----------+
 
 
 
 
+------------------+---------------+
| Sex Assigned at  | Date Recorded |
| Birth            |               |
+------------------+---------------+
| Not on file      |               |
+------------------+---------------+
 
 
 
+----------------+-------------+-------------+
| Job Start Date | Occupation  | Industry    |
+----------------+-------------+-------------+
| Not on file    | Not on file | Not on file |
+----------------+-------------+-------------+
 
 
 
+----------------+--------------+------------+
| Travel History | Travel Start | Travel End |
+----------------+--------------+------------+
 
 
 
+-------------------------------------+
| No recent travel history available. |
+-------------------------------------+
 documented as of this encounter
 
 Plan of Treatment
 
 
+--------+-----------+------------+----------------------+-------------------+
| Date   | Type      | Specialty  | Care Team            | Description       |
+--------+-----------+------------+----------------------+-------------------+
| / | Office    | Cardiology |   Tonia Wilson,    |                   |
|    | Visit     |            | ARNJESSICA  [address]   |                   |
|        |           |            | [place]  |                   |
|        |           |            | [address]  [phone]  |                   |
|        |           |            |  [phone] (Fax)  |                   |
+--------+-----------+------------+----------------------+-------------------+
| / | Hospital  | Radiology  |   Popeye Townsend, |                   |
|    | Encounter |            |  MD  [address] |                   |
|        |           |            |  St.  [place],   |                   |
|        |           |            | [place] [address]             |                   |
|        |           |            | [phone]         |                   |
|        |           |            | [phone] (Fax)   |                   |
+--------+-----------+------------+----------------------+-------------------+
| / | Surgery   | Radiology  |   Popeye Townsend, | CV EP PPM SYSTEM  |
|    |           |            |  MD  [address] | IMPLANT           |
|        |           |            |  St.  [place],   |                   |
|        |           |            | [place] [address]             |                   |
|        |           |            | [phone]         |                   |
|        |           |            | [phone] (Fax)   |                   |
+--------+-----------+------------+----------------------+-------------------+
| 02/10/ | Clinical  | Cardiology |                      |                   |
|    | Support   |            |                      |                   |
+--------+-----------+------------+----------------------+-------------------+
| / | Office    | Cardiology |   Tonia Wilson,    |                   |
|    | Visit     |            | ARNP  [address]   |                   |
 
|        |           |            | St  [place], [place]  |                   |
|        |           |            | [address]  [phone]  |                   |
|        |           |            |  [phone] (Fax)  |                   |
+--------+-----------+------------+----------------------+-------------------+
| / | Off-Site  | Nephrology |   Marzena Moser  |                   |
|    | Visit     |            | DO ETIENNE  [address] [place] [address]      |                   |
|        |           |            | [address]      |                   |
|        |           |            | [place]      |                   |
|        |           |            | [address]  [phone]  |                   |
|        |           |            |  [phone] (Fax)  |                   |
+--------+-----------+------------+----------------------+-------------------+
 documented as of this encounter
 
 Procedures
 
 
+------------------+--------+------------+----------------------+----------------------+
| Procedure Name   | Priori | Date/Time  | Associated Diagnosis | Comments             |
|                  | ty     |            |                      |                      |
+------------------+--------+------------+----------------------+----------------------+
| EXTERNAL LAB:    | Routin | 2018 |                      |   Results for this   |
| URINALYSIS       | e      |            |                      | procedure are in the |
|                  |        |            |                      |  results section.    |
+------------------+--------+------------+----------------------+----------------------+
| URINALYSIS WITH  | Routin | 2018 |                      |   Results for this   |
| MICROSCOPIC      | e      |            |                      | procedure are in the |
|                  |        |            |                      |  results section.    |
+------------------+--------+------------+----------------------+----------------------+
 documented in this encounter
 
 Results
 Urinalysis With Microscopic (2018)
 
+-------------+-----------+----------------+------------+--------------+
| Component   | Value     | Ref Range      | Performed  | Pathologist  |
|             |           |                | At         | Signature    |
+-------------+-----------+----------------+------------+--------------+
| WBC UA      | 50        | /HPF           |            |              |
+-------------+-----------+----------------+------------+--------------+
| Color,      | Sisi (A) | Light Yellow,  |            |              |
| Urine       |           | Yellow         |            |              |
+-------------+-----------+----------------+------------+--------------+
| Clarity     | Cloudy    |                |            |              |
+-------------+-----------+----------------+------------+--------------+
| BACTERIA UA | 4+ (A)    | Negative /HPF  |            |              |
+-------------+-----------+----------------+------------+--------------+
| SQUAMOUS    | 1         | /HPF           |            |              |
| EPITHELIAL  |           |                |            |              |
| UA          |           |                |            |              |
+-------------+-----------+----------------+------------+--------------+
| Nitrite,    | Negative  | Negative       |            |              |
| Urine       |           |                |            |              |
+-------------+-----------+----------------+------------+--------------+
 
 
 
+----------+
| Specimen |
+----------+
| Urine    |
 
+----------+
 External Lab: Urinalysis (2018)
 
+-------------+----------+-----------+------------+--------------+
| Component   | Value    | Ref Range | Performed  | Pathologist  |
|             |          |           | At         | Signature    |
+-------------+----------+-----------+------------+--------------+
| UA Blood,   | negative |           | EXTERNAL   |              |
| External    |          |           | LAB        |              |
+-------------+----------+-----------+------------+--------------+
| UA Glucose, | normal   |           | EXTERNAL   |              |
|  External   |          |           | LAB        |              |
+-------------+----------+-----------+------------+--------------+
| UA Ketones, | negative |           | EXTERNAL   |              |
|  External   |          |           | LAB        |              |
+-------------+----------+-----------+------------+--------------+
| UA Ph,      | 5        |           | EXTERNAL   |              |
| External    |          |           | LAB        |              |
+-------------+----------+-----------+------------+--------------+
| UA          | 100 (A)  | 0         | EXTERNAL   |              |
| Proteins,   |          |           | LAB        |              |
| External    |          |           |            |              |
+-------------+----------+-----------+------------+--------------+
| UA RBC,     | 5 (A)    | 0         | EXTERNAL   |              |
| External    |          |           | LAB        |              |
+-------------+----------+-----------+------------+--------------+
| UA Specific | 1.017    |           | EXTERNAL   |              |
|  Gravity,   |          |           | LAB        |              |
| External    |          |           |            |              |
+-------------+----------+-----------+------------+--------------+
| UA          | large    |           | EXTERNAL   |              |
| Leukocyte   |          |           | LAB        |              |
| Esterase,   |          |           |            |              |
| External    |          |           |            |              |
+-------------+----------+-----------+------------+--------------+
 
 
 
+----------------+---------+--------------------+--------------+
| Performing     | Address | City/State/Zipcode | Phone Number |
| Organization   |         |                    |              |
+----------------+---------+--------------------+--------------+
|   EXTERNAL LAB |         |                    |              |
+----------------+---------+--------------------+--------------+
 documented in this encounter
 
 Visit Diagnoses
 Not on filedocumented in this encounter

## 2020-01-08 NOTE — XMS
Encounter Summary
  Created on: 2020
 
 Viviana Ricci
 External Reference #: [id number]
 : 47
 Sex: Female
 
 Demographics
 
 
+-----------------------+----------------------+
| Address               | [address]      |
|                       | [place]  [address] |
+-----------------------+----------------------+
| Home Phone            | [phone]      |
+-----------------------+----------------------+
| Preferred Language    | Unknown              |
+-----------------------+----------------------+
| Marital Status        | Single               |
+-----------------------+----------------------+
| Gnosticist Affiliation | [id number]                 |
+-----------------------+----------------------+
| Race                  | Unknown              |
+-----------------------+----------------------+
| Ethnic Group          | Unknown              |
+-----------------------+----------------------+
 
 
 Author
 
 
+--------------+--------------------------------------------+
| Author       | [facility]  |
|              | and [facility]                                |
+--------------+--------------------------------------------+
| Organization | [facility]  |
|              | and [place]                                |
+--------------+--------------------------------------------+
| Address      | Unknown                                    |
+--------------+--------------------------------------------+
| Phone        | Unavailable                                |
+--------------+--------------------------------------------+
 
 
 
 Support
 
 
+----------------+--------------+---------------------+-----------------+
| Name           | Relationship | Address             | Phone           |
+----------------+--------------+---------------------+-----------------+
| Ada/Ed Radhames | ECON         | [address]              | [phone] |
|                |              | [place]  |                 |
|                |              |  [id number]              |                 |
+----------------+--------------+---------------------+-----------------+
 
 
 
 
 Care Team Providers
 
 
+-----------------------+------+-------------+
| Care Team Member Name | Role | Phone       |
+-----------------------+------+-------------+
 PCP  | Unavailable |
+-----------------------+------+-------------+
 
 
 
 Encounter Details
 
 
+--------+----------+---------------------+----------------------+-------------+
| Date   | Type     | Department          | Care Team            | Description |
+--------+----------+---------------------+----------------------+-------------+
| / | Abstract |   [facility] [place]         |   Marzena Moser  |             |
|    |          | NEPHROLOGY  [address]   | M, DO  [address]      |             |
|        |          | [address]   | [address]      |             |
|        |          | [place], [place]     | [place]      |             |
|        |          | [address]          | [address]  [phone]  |             |
|        |          | [phone]        |  [phone] (Fax)  |             |
+--------+----------+---------------------+----------------------+-------------+
 
 
 
 Social History
 
 
+--------------+-------+-----------+--------+------+
| Tobacco Use  | Types | Packs/Day | Years  | Date |
|              |       |           | Used   |      |
+--------------+-------+-----------+--------+------+
| Never Smoker |       |           |        |      |
+--------------+-------+-----------+--------+------+
 
 
 
+---------------------+---+---+---+
| Smokeless Tobacco:  |   |   |   |
| Never Used          |   |   |   |
+---------------------+---+---+---+
 
 
 
+-------------+-------------+---------+----------+
| Alcohol Use | Drinks/Week | oz/Week | Comments |
+-------------+-------------+---------+----------+
| No          |             |         |          |
+-------------+-------------+---------+----------+
 
 
 
+------------------+---------------+
| Sex Assigned at  | Date Recorded |
| Birth            |               |
+------------------+---------------+
| Not on file      |               |
 
+------------------+---------------+
 
 
 
+----------------+-------------+-------------+
| Job Start Date | Occupation  | Industry    |
+----------------+-------------+-------------+
| Not on file    | Not on file | Not on file |
+----------------+-------------+-------------+
 
 
 
+----------------+--------------+------------+
| Travel History | Travel Start | Travel End |
+----------------+--------------+------------+
 
 
 
+-------------------------------------+
| No recent travel history available. |
+-------------------------------------+
 documented as of this encounter
 
 Plan of Treatment
 
 
+--------+-----------+------------+----------------------+-------------------+
| Date   | Type      | Specialty  | Care Team            | Description       |
+--------+-----------+------------+----------------------+-------------------+
| / | Office    | Cardiology |   Tonia Wilson,    |                   |
|    | Visit     |            | ARNP  [address]   |                   |
|        |           |            | [place]  |                   |
|        |           |            | [address] [phone]  |                   |
|        |           |            |  [phone] (Fax)  |                   |
+--------+-----------+------------+----------------------+-------------------+
| / | Hospital  | Radiology  |   Popeye Townsend, |                   |
|    | Encounter |            |  MD  [address] |                   |
|        |           |            |  [place]   |                   |
|        |           |            | [place] [address]             |                   |
|        |           |            | [phone]         |                   |
|        |           |            | [phone] (Fax)   |                   |
+--------+-----------+------------+----------------------+-------------------+
| / | Surgery   | Radiology  |   CaryYinmarissa, | CV EP PPM SYSTEM  |
2020   |           |            |  MD  [address] | IMPLANT           |
|        |           |            |  [place],   |                   |
|        |           |            | [place] [address]             |                   |
|        |           |            | [phone]         |                   |
|        |           |            | [phone] (Fax)   |                   |
+--------+-----------+------------+----------------------+-------------------+
| 02/10/ | Clinical  | Cardiology |                      |                   |
|    | Support   |            |                      |                   |
+--------+-----------+------------+----------------------+-------------------+
| / | Office    | Cardiology |   Tonia Wilson,    |                   |
2020   | Visit     |            | BELKIS  [address]   |                   |
|        |           |            |   [place]  |                   |
|        |           |            | [address]  [phone]  |                   |
|        |           |            |  [phone] (Fax)  |                   |
+--------+-----------+------------+----------------------+-------------------+
| / | Off-Site  | Nephrology |   Marzena Moser  |                   |
2020   | Visit     |            | DO [address]      |                   |
 
|        |           |            | [address]      |                   |
|        |           |            | [place]      |                   |
|        |           |            | [address]  [phone]  |                   |
|        |           |            |  [phone] (Fax)  |                   |
+--------+-----------+------------+----------------------+-------------------+
 documented as of this encounter
 
 Procedures
 
 
+-----------------+--------+------------+----------------------+----------------------+
| Procedure Name  | Priori | Date/Time  | Associated Diagnosis | Comments             |
|                 | ty     |            |                      |                      |
+-----------------+--------+------------+----------------------+----------------------+
| COMPREHENSIVE   | Routin | 2013 |                      |   Results for this   |
| METABOLIC PANEL | e      |            |                      | procedure are in the |
|                 |        |            |                      |  results section.    |
+-----------------+--------+------------+----------------------+----------------------+
 documented in this encounter
 
 Results
 Comprehensive Metabolic Panel (2013)
 
+-------------+-----------+-----------------+-------------+--------------+
| Component   | Value     | Ref Range       | Performed   | Pathologist  |
|             |           |                 | At          | Signature    |
+-------------+-----------+-----------------+-------------+--------------+
| Na          | 141       | mmol/L          | [facility]  |              |
|             |           |                 | [facility]    |              |
|             |           |                 | [facility]     |              |
|             |           |                 | [facility] -    |              |
|             |           |                 | LABORATORY  |              |
+-------------+-----------+-----------------+-------------+--------------+
| K           | 4.4       | mmol/L          | [facility]  |              |
|             |           |                 | [facility]    |              |
|             |           |                 | [facility]     |              |
|             |           |                 | [facility] -    |              |
|             |           |                 | LABORATORY  |              |
+-------------+-----------+-----------------+-------------+--------------+
| Cl          | 104       | mmol/L          | [facility]  |              |
|             |           |                 | [facility]    |              |
|             |           |                 | [facility]     |              |
|             |           |                 | [facility] -    |              |
|             |           |                 | LABORATORY  |              |
+-------------+-----------+-----------------+-------------+--------------+
| CO2         | 28        | mmol/L          | [facility]  |              |
|             |           |                 | [facility]    |              |
|             |           |                 | [facility]     |              |
|             |           |                 | [facility] -    |              |
|             |           |                 | LABORATORY  |              |
+-------------+-----------+-----------------+-------------+--------------+
| BUN         | 47        | mg/dL           | [facility]  |              |
|             |           |                 | [facility]    |              |
|             |           |                 | [facility]     |              |
|             |           |                 | [facility] -    |              |
|             |           |                 | LABORATORY  |              |
+-------------+-----------+-----------------+-------------+--------------+
| CREA        | 1.4       | mg/dL           | [facility]  |              |
|             |           |                 | [facility]    |              |
|             |           |                 | [facility]     |              |
 
|             |           |                 | [facility] -    |              |
|             |           |                 | LABORATORY  |              |
+-------------+-----------+-----------------+-------------+--------------+
| Glucose     | 164       | mg/dL           | [facility]  |              |
|             |           |                 | [facility]    |              |
|             |           |                 | [facility]     |              |
|             |           |                 | [facility] -    |              |
|             |           |                 | LABORATORY  |              |
+-------------+-----------+-----------------+-------------+--------------+
| Calcium     | 10.2      | mg/dL           | [facility]  |              |
|             |           |                 | [facility]    |              |
|             |           |                 | [facility]     |              |
|             |           |                 | [facility] -    |              |
|             |           |                 | LABORATORY  |              |
+-------------+-----------+-----------------+-------------+--------------+
| PTH INTACT  | 226       | pg/mL           | [facility]  |              |
|             |           |                 | [facility]    |              |
|             |           |                 | [facility]     |              |
|             |           |                 | [facility] -    |              |
|             |           |                 | LABORATORY  |              |
+-------------+-----------+-----------------+-------------+--------------+
| Phosphorus  | 2.9       | 2.6 - 4.4 mg/dL | [facility]  |              |
|             |           |                 | [facility]    |              |
|             |           |                 | [facility]     |              |
|             |           |                 | [facility] -    |              |
|             |           |                 | LABORATORY  |              |
+-------------+-----------+-----------------+-------------+--------------+
| Magnesium   | 1.8       | mg/dL           | [facility]  |              |
|             |           |                 | [facility]    |              |
|             |           |                 | [facility]     |              |
|             |           |                 | [facility] -    |              |
|             |           |                 | LABORATORY  |              |
+-------------+-----------+-----------------+-------------+--------------+
| WBC         | 6.5       | K/uL            | [facility]  |              |
|             |           |                 | [facility]    |              |
|             |           |                 | [facility]     |              |
|             |           |                 | [facility] -    |              |
|             |           |                 | LABORATORY  |              |
+-------------+-----------+-----------------+-------------+--------------+
| Hemoglobin  | 13.0      | 11.6 - 15.5     | [facility]  |              |
|             |           | g/dL            | [facility]    |              |
|             |           |                 | [facility]     |              |
|             |           |                 | [facility] -    |              |
|             |           |                 | LABORATORY  |              |
+-------------+-----------+-----------------+-------------+--------------+
| Hematocrit  | 40.7      | 35.0 - 46.0 %   | [facility]  |              |
|             |           |                 | [facility]    |              |
|             |           |                 | [facility]     |              |
|             |           |                 | [facility] -    |              |
|             |           |                 | LABORATORY  |              |
+-------------+-----------+-----------------+-------------+--------------+
| Platelet    | 176       | 150 - 400 K/uL  | [facility]  |              |
| Count       |           |                 | [facility]    |              |
|             |           |                 | [facility]     |              |
|             |           |                 | [facility] -    |              |
|             |           |                 | LABORATORY  |              |
+-------------+-----------+-----------------+-------------+--------------+
| MCV         | 104.6 (A) | 80.0 - 98.0 fL  | [facility]  |              |
|             |           |                 | [facility]    |              |
|             |           |                 | [facility]     |              |
 
|             |           |                 | CENTER -    |              |
|             |           |                 | LABORATORY  |              |
+-------------+-----------+-----------------+-------------+--------------+
| Bilirubin,  | 0.5       | 0 - 2           | [facility]  |              |
| Total       |           |                 | [facility]    |              |
|             |           |                 | [facility]     |              |
|             |           |                 | [facility] -    |              |
|             |           |                 | LABORATORY  |              |
+-------------+-----------+-----------------+-------------+--------------+
| AST         | 19        | 5 - 40 U/L      | [facility]  |              |
|             |           |                 | [facility]    |              |
|             |           |                 | [facility]     |              |
|             |           |                 | [facility] -    |              |
|             |           |                 | LABORATORY  |              |
+-------------+-----------+-----------------+-------------+--------------+
| ALT         | 13        | U/L             | [facility]  |              |
|             |           |                 | [facility]    |              |
|             |           |                 | [facility]     |              |
|             |           |                 | [facility] -    |              |
|             |           |                 | LABORATORY  |              |
+-------------+-----------+-----------------+-------------+--------------+
| Albumin     | 4.0       | 3.3 - 4.8 g/dL  | [facility]  |              |
|             |           |                 | [facility]    |              |
|             |           |                 | [facility]     |              |
|             |           |                 | [facility] -    |              |
|             |           |                 | LABORATORY  |              |
+-------------+-----------+-----------------+-------------+--------------+
| Tacrolimus  | 3.7       |                 | [facility]  |              |
| Level       |           |                 | [facility]    |              |
|             |           |                 | [facility]     |              |
|             |           |                 | C[facility] -    |              |
|             |           |                 | LABORATORY  |              |
+-------------+-----------+-----------------+-------------+--------------+
| Estimated   | 37.0      | mL/min/1.73m2   | [facility]  |              |
| GFR         |           |                 | [facility]    |              |
|             |           |                 | [facility]     |              |
|             |           |                 | CENTER -    |              |
|             |           |                 | LABORATORY  |              |
+-------------+-----------+-----------------+-------------+--------------+
| Alkaline    | 80        | 38 - 110 U/L    | [facility]  |              |
| Phosphatase |           |                 |  [facility]    |              |
|             |           |                 | [facility]     |              |
|             |           |                 | [facility] -    |              |
|             |           |                 | LABORATORY  |              |
+-------------+-----------+-----------------+-------------+--------------+
 
 
 
+-----------------+
| Specimen        |
+-----------------+
| Blood specimen  |
| (specimen)      |
+-----------------+
 
 
 
+----------------------+--------------------+--------------------+--------------+
| Performing           | Address            | City/State/Zipcode | Phone Number |
| Organization         |                    |                    |              |
 
+----------------------+--------------------+--------------------+--------------+
|   [facility]     |   [address] | [place]    |              |
| [facility]  |                    | [address]              |              |
| - LABORATORY         |                    |                    |              |
+----------------------+--------------------+--------------------+--------------+
 documented in this encounter
 
 Visit Diagnoses
 Not on filedocumented in this encounter

## 2020-01-08 NOTE — XMS
Encounter Summary
  Created on: 2020
 
 Viviana Ricci
 External Reference #: [id number]
 : 47
 Sex: Female
 
 Demographics
 
 
+-----------------------+----------------------+
| Address               | [address]      |
|                       | [place]  [address] |
+-----------------------+----------------------+
| Home Phone            | [phone]      |
+-----------------------+----------------------+
| Preferred Language    | Unknown              |
+-----------------------+----------------------+
| Marital Status        | Single               |
+-----------------------+----------------------+
| Adventism Affiliation | [id number]                 |
+-----------------------+----------------------+
| Race                  | Unknown              |
+-----------------------+----------------------+
| Ethnic Group          | Unknown              |
+-----------------------+----------------------+
 
 
 Author
 
 
+--------------+--------------------------------------------+
| Author       | [facility]  |
|              | and [facility]                                |
+--------------+--------------------------------------------+
| Organization | [facility]  |
|              | and [facility]                                |
+--------------+--------------------------------------------+
| Address      | Unknown                                    |
+--------------+--------------------------------------------+
| Phone        | Unavailable                                |
+--------------+--------------------------------------------+
 
 
 
 Support
 
 
+----------------+--------------+---------------------+-----------------+
| Name           | Relationship | Address             | Phone           |
+----------------+--------------+---------------------+-----------------+
| Ada/Ed Radhames | ECON         | [address]              | [phone] |
|                |              | [place]  |                 |
|                |              |  [address]              |                 |
+----------------+--------------+---------------------+-----------------+
 
 
 
 
 Care Team Providers
 
 
+------------------------+------+-----------------+
| Care Team Member Name  | Role | Phone           |
+------------------------+------+-----------------+
| Marzena Moser DO | PCP  | [phone] |
+------------------------+------+-----------------+
 
 
 
 Reason for Visit
 
 
+-------------+----------+
| Reason      | Comments |
+-------------+----------+
| Lab Results |          |
+-------------+----------+
 
 
 
 Encounter Details
 
 
+--------+-----------+---------------------+----------------------+-------------+
| Date   | Type      | Department          | Care Team            | Description |
+--------+-----------+---------------------+----------------------+-------------+
| / | Telephone |   [facility]         |   Marzena Moser  | Lab Results |
| 2018   |           | NEPHROLOGY  [address]   | M, DO  [address]      |             |
|        |           | [address]   | [address]      |             |
|        |           | [place], [place]     | [place], [place]      |             |
|        |           | [address]          | [address]  [phone]  |             |
|        |           | [phone]        |  [phone] (Fax)  |             |
+--------+-----------+---------------------+----------------------+-------------+
 
 
 
 Social History
 
 
+--------------+-------+-----------+--------+------+
| Tobacco Use  | Types | Packs/Day | Years  | Date |
|              |       |           | Used   |      |
+--------------+-------+-----------+--------+------+
| Never Smoker |       |           |        |      |
+--------------+-------+-----------+--------+------+
 
 
 
+---------------------+---+---+---+
| Smokeless Tobacco:  |   |   |   |
| Never Used          |   |   |   |
+---------------------+---+---+---+
 
 
 
+---------------------------------------------------------------+
| Comments: some second hand smoke exposure, but fairly minimal |
 
+---------------------------------------------------------------+
 
 
 
+-------------+-------------+---------+----------+
| Alcohol Use | Drinks/Week | oz/Week | Comments |
+-------------+-------------+---------+----------+
| No          |             |         |          |
+-------------+-------------+---------+----------+
 
 
 
+------------------+---------------+
| Sex Assigned at  | Date Recorded |
| Birth            |               |
+------------------+---------------+
| Not on file      |               |
+------------------+---------------+
 
 
 
+----------------+-------------+-------------+
| Job Start Date | Occupation  | Industry    |
+----------------+-------------+-------------+
| Not on file    | Not on file | Not on file |
+----------------+-------------+-------------+
 
 
 
+----------------+--------------+------------+
| Travel History | Travel Start | Travel End |
+----------------+--------------+------------+
 
 
 
+-------------------------------------+
| No recent travel history available. |
+-------------------------------------+
 documented as of this encounter
 
 Plan of Treatment
 
 
+--------+-----------+------------+----------------------+-------------------+
| Date   | Type      | Specialty  | Care Team            | Description       |
+--------+-----------+------------+----------------------+-------------------+
| / | Office    | Cardiology |   Tonia Wilson,    |                   |
|    | Visit     |            | ARNJESSICA  [address]   |                   |
|        |           |            | St  [place], [place]  |                   |
|        |           |            | [address]  [phone]  |                   |
|        |           |            |  [phone] (Fax)  |                   |
+--------+-----------+------------+----------------------+-------------------+
| / | Hospital  | Radiology  |   Popeye Townsend, |                   |
|    | Encounter |            |  MD  [address] |                   |
|        |           |            |  St.  [place],   |                   |
|        |           |            | [place] [address]             |                   |
|        |           |            | [phone]         |                   |
|        |           |            | [phone] (Fax)   |                   |
+--------+-----------+------------+----------------------+-------------------+
| / | Surgery   | Radiology  |   Popeye Townsend, | CV EP PPM SYSTEM  |
 
|    |           |            |  MD  [address] | IMPLANT           |
|        |           |            |  St.  [place],   |                   |
|        |           |            | [place] [address]             |                   |
|        |           |            | [phone]         |                   |
|        |           |            | [phone] (Fax)   |                   |
+--------+-----------+------------+----------------------+-------------------+
| 02/10/ | Clinical  | Cardiology |                      |                   |
|    | Support   |            |                      |                   |
+--------+-----------+------------+----------------------+-------------------+
| / | Office    | Cardiology |   Tonia Wilson,    |                   |
|    | Visit     |            | [facility]  [address]   |                   |
|        |           |            | [place]  |                   |
|        |           |            | [address]  [phone]  |                   |
|        |           |            |  [phone] (Fax)  |                   |
+--------+-----------+------------+----------------------+-------------------+
| / | Off-Site  | Nephrology |   Marzena Moser  |                   |
|    | Visit     |            | DO ETIENNE  [address]      |                   |
|        |           |            | [address]      |                   |
|        |           |            | [place]      |                   |
|        |           |            | [address]  [phone]  |                   |
|        |           |            |  [phone] (Fax)  |                   |
+--------+-----------+------------+----------------------+-------------------+
 documented as of this encounter
 
 Procedures
 
 
+------------------+--------+------------+----------------------+----------------------+
| Procedure Name   | Priori | Date/Time  | Associated Diagnosis | Comments             |
|                  | ty     |            |                      |                      |
+------------------+--------+------------+----------------------+----------------------+
| CULTURE, URINE,  | Routin | 2018 |                      |   Results for this   |
| REFLEXIVE        | e      |            |                      | procedure are in the |
|                  |        |            |                      |  results section.    |
+------------------+--------+------------+----------------------+----------------------+
 documented in this encounter
 
 Results
 Culture, Urine, Reflexive (2018)
 
+----------+
| Specimen |
+----------+
| Urine    |
+----------+
 
 
 
+-----------------------------------------------------------------+--------------+
| Narrative                                                       | Performed At |
+-----------------------------------------------------------------+--------------+
|   18- Over 100,000 CFU/mL Lactose  identified as  |              |
| Escherichia coli.                                               |              |
+-----------------------------------------------------------------+--------------+
 
 
 
+------------------+------------------+--------+----------------+
| Organism         | Antibiotic       | Method | Susceptibility |
+------------------+------------------+--------+----------------+
 
| Escherichia coli | Ampicillin       |        |   Sensitive    |
+------------------+------------------+--------+----------------+
| Escherichia coli | Amoxicillin +    |        |   Sensitive    |
|                  | Clavulanate      |        |                |
+------------------+------------------+--------+----------------+
| Escherichia coli | Piperacillin +   |        |   Sensitive    |
|                  | Tazobactam       |        |                |
+------------------+------------------+--------+----------------+
| Escherichia coli | Cefazolin        |        |   Sensitive    |
+------------------+------------------+--------+----------------+
| Escherichia coli | Ceftriaxone      |        |   Sensitive    |
+------------------+------------------+--------+----------------+
| Escherichia coli | Cefepime         |        |   Sensitive    |
+------------------+------------------+--------+----------------+
| Escherichia coli | Aztreonam        |        |   Sensitive    |
+------------------+------------------+--------+----------------+
| Escherichia coli | Ertapenem        |        |   Sensitive    |
+------------------+------------------+--------+----------------+
| Escherichia coli | Imipenem         |        |   Sensitive    |
+------------------+------------------+--------+----------------+
| Escherichia coli | Meropenem        |        |   Sensitive    |
+------------------+------------------+--------+----------------+
| Escherichia coli | Gentamicin       |        |   Sensitive    |
+------------------+------------------+--------+----------------+
| Escherichia coli | Ciprofloxacin    |        |   Sensitive    |
+------------------+------------------+--------+----------------+
| Escherichia coli | Levofloxacin     |        |   Sensitive    |
+------------------+------------------+--------+----------------+
| Escherichia coli | Tetracycline     |        |   Sensitive    |
+------------------+------------------+--------+----------------+
| Escherichia coli | Nitrofurantoin   |        |   Sensitive    |
+------------------+------------------+--------+----------------+
| Escherichia coli | Trimethoprim +   |        |   Sensitive    |
|                  | Sulfamethoxazole |        |                |
+------------------+------------------+--------+----------------+
 documented in this encounter
 
 Visit Diagnoses
 Not on filedocumented in this encounter

## 2020-01-08 NOTE — XMS
Encounter Summary
  Created on: 2020
 
 Viviana Ricci
 External Reference #: [id number]
 : 47
 Sex: Female
 
 Demographics
 
 
+-----------------------+----------------------+
| Address               | [address]      |
|                       | [place]  [address] |
+-----------------------+----------------------+
| Home Phone            | [phone]      |
+-----------------------+----------------------+
| Preferred Language    | Unknown              |
+-----------------------+----------------------+
| Marital Status        | Single               |
+-----------------------+----------------------+
| Nondenominational Affiliation | [id number]                 |
+-----------------------+----------------------+
| Race                  | Unknown              |
+-----------------------+----------------------+
| Ethnic Group          | Unknown              |
+-----------------------+----------------------+
 
 
 Author
 
 
+--------------+--------------------------------------------+
| Author       | [facility]  |
|              | and [place]                                |
+--------------+--------------------------------------------+
| Organization | [facility]  |
|              | and [facility]                                |
+--------------+--------------------------------------------+
| Address      | Unknown                                    |
+--------------+--------------------------------------------+
| Phone        | Unavailable                                |
+--------------+--------------------------------------------+
 
 
 
 Support
 
 
+----------------+--------------+---------------------+-----------------+
| Name           | Relationship | Address             | Phone           |
+----------------+--------------+---------------------+-----------------+
| Ada/Ed Radhames | ECON         | [address]              | [phone] |
|                |              | [place]  |                 |
|                |              |  [address]              |                 |
+----------------+--------------+---------------------+-----------------+
 
 
 
 
 Care Team Providers
 
 
+------------------------+------+-----------------+
| Care Team Member Name  | Role | Phone           |
+------------------------+------+-----------------+
| Marzena Moser DO | PCP  | [phone] |
+------------------------+------+-----------------+
 
 
 
 Reason for Visit
 
 
+-------------------+----------+
| Reason            | Comments |
+-------------------+----------+
| Medication Refill |          |
+-------------------+----------+
 
 
 
 Encounter Details
 
 
+--------+--------+---------------------+----------------------+-------------------+
| Date   | Type   | Department          | Care Team            | Description       |
+--------+--------+---------------------+----------------------+-------------------+
| 10/27/ | Refill |   [facility]         |   Marzena Moser  | Medication Refill |
| 2017   |        | NEPHROLOGY  [address]   | M, DO  [address]      |                   |
|        |        | [address]   | [address]      |                   |
|        |        | [place], [place]     | [place], [place]      |                   |
|        |        | [address]          | [address]  [phone]  |                   |
|        |        | [phone]        |  [phone] (Fax)  |                   |
+--------+--------+---------------------+----------------------+-------------------+
 
 
 
 Social History
 
 
+--------------+-------+-----------+--------+------+
| Tobacco Use  | Types | Packs/Day | Years  | Date |
|              |       |           | Used   |      |
+--------------+-------+-----------+--------+------+
| Never Smoker |       |           |        |      |
+--------------+-------+-----------+--------+------+
 
 
 
+---------------------+---+---+---+
| Smokeless Tobacco:  |   |   |   |
| Never Used          |   |   |   |
+---------------------+---+---+---+
 
 
 
+---------------------------------------------------------------+
| Comments: some second hand smoke exposure, but fairly minimal |
 
+---------------------------------------------------------------+
 
 
 
+-------------+-------------+---------+----------+
| Alcohol Use | Drinks/Week | oz/Week | Comments |
+-------------+-------------+---------+----------+
| No          |             |         |          |
+-------------+-------------+---------+----------+
 
 
 
+------------------+---------------+
| Sex Assigned at  | Date Recorded |
| Birth            |               |
+------------------+---------------+
| Not on file      |               |
+------------------+---------------+
 
 
 
+----------------+-------------+-------------+
| Job Start Date | Occupation  | Industry    |
+----------------+-------------+-------------+
| Not on file    | Not on file | Not on file |
+----------------+-------------+-------------+
 
 
 
+----------------+--------------+------------+
| Travel History | Travel Start | Travel End |
+----------------+--------------+------------+
 
 
 
+-------------------------------------+
| No recent travel history available. |
+-------------------------------------+
 documented as of this encounter
 
 Plan of Treatment
 
 
+--------+-----------+------------+----------------------+-------------------+
| Date   | Type      | Specialty  | Care Team            | Description       |
+--------+-----------+------------+----------------------+-------------------+
| / | Office    | Cardiology |   Tonia Wilson,    |                   |
|    | Visit     |            | ARNP  [address]   |                   |
|        |           |            | St [place], [place]  |                   |
|        |           |            | [address]  [phone]  |                   |
|        |           |            |  [phone] (Fax)  |                   |
+--------+-----------+------------+----------------------+-------------------+
| / | Hospital  | Radiology  |   Popeye Townsend, |                   |
|    | Encounter |            |  MD  [address] |                   |
|        |           |            |  [place],   |                   |
|        |           |            | [place] [address]             |                   |
|        |           |            | [phone]         |                   |
|        |           |            | [phone] (Fax)   |                   |
+--------+-----------+------------+----------------------+-------------------+
| / | Surgery   | Radiology  |   Popeye Townsend, | CV EP PPM SYSTEM  |
 
|    |           |            |  MD  [address] | IMPLANT           |
|        |           |            |  [place],   |                   |
|        |           |            | [place] [address]             |                   |
|        |           |            | [phone]         |                   |
|        |           |            | [phone] (Fax)   |                   |
+--------+-----------+------------+----------------------+-------------------+
| 02/10/ | Clinical  | Cardiology |                      |                   |
|    | Support   |            |                      |                   |
+--------+-----------+------------+----------------------+-------------------+
| / | Office    | Cardiology |   Tonia Wilson,    |                   |
|    | Visit     |            | ARNP  [address]   |                   |
|        |           |            | [place]  |                   |
|        |           |            | [id number] [phone]  |                   |
|        |           |            |  [phone] (Fax)  |                   |
+--------+-----------+------------+----------------------+-------------------+
| / | Off-Site  | Nephrology |   Marzena Moser  |                   |
|    | Visit     |            | DO ETIENNE  [address] [place] [address]      |                   |
|        |           |            | [address]      |                   |
|        |           |            | [place]      |                   |
|        |           |            | [address] [phone]  |                   |
|        |           |            |  [phone] (Fax)  |                   |
+--------+-----------+------------+----------------------+-------------------+
 documented as of this encounter
 
 Visit Diagnoses
 
 
+-------------------------------------------+
| Diagnosis                                 |
+-------------------------------------------+
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
+-------------------------------------------+
 documented in this encounter

## 2020-01-08 NOTE — XMS
Encounter Summary
  Created on: 2020
 
 Viviana Ricci
 External Reference #: [id number]
 : 47
 Sex: Female
 
 Demographics
 
 
+-----------------------+----------------------+
| Address               | [address]      |
|                       | [place]  [address] |
+-----------------------+----------------------+
| Home Phone            | [phone]      |
+-----------------------+----------------------+
| Preferred Language    | Unknown              |
+-----------------------+----------------------+
| Marital Status        | Single               |
+-----------------------+----------------------+
| Jew Affiliation | [id number]                 |
+-----------------------+----------------------+
| Race                  | Unknown              |
+-----------------------+----------------------+
| Ethnic Group          | Unknown              |
+-----------------------+----------------------+
 
 
 Author
 
 
+--------------+--------------------------------------------+
| Author       | [facility]  |
|              | and [place]                                |
+--------------+--------------------------------------------+
| Organization | [facility] [place]  |
|              | and [place]                                |
+--------------+--------------------------------------------+
| Address      | Unknown                                    |
+--------------+--------------------------------------------+
| Phone        | Unavailable                                |
+--------------+--------------------------------------------+
 
 
 
 Support
 
 
+----------------+--------------+---------------------+-----------------+
| Name           | Relationship | Address             | Phone           |
+----------------+--------------+---------------------+-----------------+
| Ada/Ed Radhames | ECON         | [place]              | [phone] |
|                |              | [place]  |                 |
|                |              |  [address]              |                 |
+----------------+--------------+---------------------+-----------------+
 
 
 
 
 Care Team Providers
 
 
+-----------------------+------+-------------+
| Care Team Member Name | Role | Phone       |
+-----------------------+------+-------------+
 PCP  | Unavailable |
+-----------------------+------+-------------+
 
 
 
 Reason for Visit
 
 
+-------------------+----------+
| Reason            | Comments |
+-------------------+----------+
| Medication Refill |          |
+-------------------+----------+
 
 
 
 Encounter Details
 
 
+--------+--------+---------------------+----------------------+-------------------+
| Date   | Type   | Department          | Care Team            | Description       |
+--------+--------+---------------------+----------------------+-------------------+
| 10/10/ | Refill |   [place]         |   Marzena Moser  | Medication Refill |
| 2016   |        | NEPHROLOGY  [address]   | M, DO  [address]      |                   |
|        |        | [address]   | [address]      |                   |
|        |        | [place], [place]     | [place], [place]      |                   |
|        |        | [address]          | [address]  [phone]  |                   |
|        |        | [phone]        |  [phone] (Fax)  |                   |
+--------+--------+---------------------+----------------------+-------------------+
 
 
 
 Social History
 
 
+--------------+-------+-----------+--------+------+
| Tobacco Use  | Types | Packs/Day | Years  | Date |
|              |       |           | Used   |      |
+--------------+-------+-----------+--------+------+
| Never Smoker |       |           |        |      |
+--------------+-------+-----------+--------+------+
 
 
 
+---------------------+---+---+---+
| Smokeless Tobacco:  |   |   |   |
| Never Used          |   |   |   |
+---------------------+---+---+---+
 
 
 
+---------------------------------------------------------------+
| Comments: some second hand smoke exposure, but fairly minimal |
 
+---------------------------------------------------------------+
 
 
 
+-------------+-------------+---------+----------+
| Alcohol Use | Drinks/Week | oz/Week | Comments |
+-------------+-------------+---------+----------+
| No          |             |         |          |
+-------------+-------------+---------+----------+
 
 
 
+------------------+---------------+
| Sex Assigned at  | Date Recorded |
| Birth            |               |
+------------------+---------------+
| Not on file      |               |
+------------------+---------------+
 
 
 
+----------------+-------------+-------------+
| Job Start Date | Occupation  | Industry    |
+----------------+-------------+-------------+
| Not on file    | Not on file | Not on file |
+----------------+-------------+-------------+
 
 
 
+----------------+--------------+------------+
| Travel History | Travel Start | Travel End |
+----------------+--------------+------------+
 
 
 
+-------------------------------------+
| No recent travel history available. |
+-------------------------------------+
 documented as of this encounter
 
 Plan of Treatment
 
 
+--------+-----------+------------+----------------------+-------------------+
| Date   | Type      | Specialty  | Care Team            | Description       |
+--------+-----------+------------+----------------------+-------------------+
| / | Office    | Cardiology |   HellalfredoTonia,    |                   |
|    | Visit     |            | ARNP  [address]   |                   |
|        |           |            | St  [place], [place]  |                   |
|        |           |            | [address]  [phone]  |                   |
|        |           |            |  [phone] (Fax)  |                   |
+--------+-----------+------------+----------------------+-------------------+
| / | Hospital  | Radiology  |   Popeye Townsend, |                   |
|    | Encounter |            |  MD  [address] |                   |
|        |           |            |  St.  [place],   |                   |
|        |           |            | [place] [address]             |                   |
|        |           |            | [phone]         |                   |
|        |           |            | [phone] (Fax)   |                   |
+--------+-----------+------------+----------------------+-------------------+
| / | Surgery   | Radiology  |   Popeye Townsend, | CV EP PPM SYSTEM  |
 
|    |           |            |  MD  [address] | IMPLANT           |
|        |           |            |  St.  [place],   |                   |
|        |           |            | [place] [address]             |                   |
|        |           |            | [phone]         |                   |
|        |           |            | [phone] (Fax)   |                   |
+--------+-----------+------------+----------------------+-------------------+
| 02/10/ | Clinical  | Cardiology |                      |                   |
|    | Support   |            |                      |                   |
+--------+-----------+------------+----------------------+-------------------+
| / | Office    | Cardiology |   Tonia Wilson,    |                   |
|    | Visit     |            | ARNP  [address]   |                   |
|        |           |            | [place]  |                   |
|        |           |            | [address]  [phone]  |                   |
|        |           |            |  [phone] (Fax)  |                   |
+--------+-----------+------------+----------------------+-------------------+
| / | Off-Site  | Nephrology |   Marzena Moser  |                   |
|    | Visit     |            | DO ETIENNE  [address] [place] [address]      |                   |
|        |           |            | [address]      |                   |
|        |           |            | [place]      |                   |
|        |           |            | [address]  [phone]  |                   |
|        |           |            |  [phone] (Fax)  |                   |
+--------+-----------+------------+----------------------+-------------------+
 documented as of this encounter
 
 Visit Diagnoses
 Not on filedocumented in this encounter

## 2020-01-08 NOTE — XMS
Encounter Summary
  Created on: 2020
 
 Viviana Ricci
 External Reference #: [id number]
 : 47
 Sex: Female
 
 Demographics
 
 
+-----------------------+----------------------+
| Address               | [address]      |
|                       | [place]  [address] |
+-----------------------+----------------------+
| Home Phone            | [phone]      |
+-----------------------+----------------------+
| Preferred Language    | Unknown              |
+-----------------------+----------------------+
| Marital Status        | Single               |
+-----------------------+----------------------+
| Taoist Affiliation | 1009                 |
+-----------------------+----------------------+
| Race                  | Unknown              |
+-----------------------+----------------------+
| Ethnic Group          | Unknown              |
+-----------------------+----------------------+
 
 
 Author
 
 
+--------------+--------------------------------------------+
| Author       | [facility] [place]  |
|              | and [place]                                |
+--------------+--------------------------------------------+
| Organization | [facility] [place]  |
|              | and [place]                                |
+--------------+--------------------------------------------+
| Address      | Unknown                                    |
+--------------+--------------------------------------------+
| Phone        | Unavailable                                |
+--------------+--------------------------------------------+
 
 
 
 Support
 
 
+----------------+--------------+---------------------+-----------------+
| Name           | Relationship | Address             | Phone           |
+----------------+--------------+---------------------+-----------------+
| Ada/Ed Radhames | ECON         | [place]              | [phone] |
|                |              | [place]  |                 |
|                |              |  [address]              |                 |
+----------------+--------------+---------------------+-----------------+
 
 
 
 
 Care Team Providers
 
 
+-----------------------+------+-------------+
| Care Team Member Name | Role | Phone       |
+-----------------------+------+-------------+
 PCP  | Unavailable |
+-----------------------+------+-------------+
 
 
 
 Encounter Details
 
 
+--------+----------+---------------------+----------------------+-------------+
| Date   | Type     | Department          | Care Team            | Description |
+--------+----------+---------------------+----------------------+-------------+
| / | Abstract |   [facility] [place]         |   Marzena Moser  |             |
|    |          | NEPHROLOGY  [address]   | M, DO  [address]      |             |
|        |          | [address]   | [address]      |             |
|        |          | [place], [place]     | [place]      |             |
|        |          | [address]          | [address]  [phone]  |             |
|        |          | [phone]        |  [phone] (Fax)  |             |
+--------+----------+---------------------+----------------------+-------------+
 
 
 
 Social History
 
 
+--------------+-------+-----------+--------+------+
| Tobacco Use  | Types | Packs/Day | Years  | Date |
|              |       |           | Used   |      |
+--------------+-------+-----------+--------+------+
| Never Smoker |       |           |        |      |
+--------------+-------+-----------+--------+------+
 
 
 
+---------------------+---+---+---+
| Smokeless Tobacco:  |   |   |   |
| Never Used          |   |   |   |
+---------------------+---+---+---+
 
 
 
+---------------------------------------------------------------+
| Comments: some second hand smoke exposure, but fairly minimal |
+---------------------------------------------------------------+
 
 
 
+-------------+-------------+---------+----------+
| Alcohol Use | Drinks/Week | oz/Week | Comments |
+-------------+-------------+---------+----------+
| No          |             |         |          |
+-------------+-------------+---------+----------+
 
 
 
 
+------------------+---------------+
| Sex Assigned at  | Date Recorded |
| Birth            |               |
+------------------+---------------+
| Not on file      |               |
+------------------+---------------+
 
 
 
+----------------+-------------+-------------+
| Job Start Date | Occupation  | Industry    |
+----------------+-------------+-------------+
| Not on file    | Not on file | Not on file |
+----------------+-------------+-------------+
 
 
 
+----------------+--------------+------------+
| Travel History | Travel Start | Travel End |
+----------------+--------------+------------+
 
 
 
+-------------------------------------+
| No recent travel history available. |
+-------------------------------------+
 documented as of this encounter
 
 Plan of Treatment
 
 
+--------+-----------+------------+----------------------+-------------------+
| Date   | Type      | Specialty  | Care Team            | Description       |
+--------+-----------+------------+----------------------+-------------------+
| / | Office    | Cardiology |   Tonia Wilson,    |                   |
|    | Visit     |            | ARNJESSICA  [address]   |                   |
|        |           |            | [place]  |                   |
|        |           |            | [address]  [phone]  |                   |
|        |           |            |  [phone] (Fax)  |                   |
+--------+-----------+------------+----------------------+-------------------+
| / | Hospital  | Radiology  |   Popeye Townsend, |                   |
|    | Encounter |            |  MD  [address] |                   |
|        |           |            |  St.  [place],   |                   |
|        |           |            | [place] [address]             |                   |
|        |           |            | [phone]         |                   |
|        |           |            | [phone] (Fax)   |                   |
+--------+-----------+------------+----------------------+-------------------+
| / | Surgery   | Radiology  |   Popeye Townsend, | CV EP PPM SYSTEM  |
|    |           |            |  MD  [address] | IMPLANT           |
|        |           |            |  St.  [place],   |                   |
|        |           |            | [place] [address]             |                   |
|        |           |            | [phone]         |                   |
|        |           |            | [phone] (Fax)   |                   |
+--------+-----------+------------+----------------------+-------------------+
| 02/10/ | Clinical  | Cardiology |                      |                   |
|    | Support   |            |                      |                   |
+--------+-----------+------------+----------------------+-------------------+
| / | Office    | Cardiology |   Tonia Wilson,    |                   |
|    | Visit     |            | ARNP  [address]   |                   |
 
|        |           |            | St  [place], [place]  |                   |
|        |           |            | [address]  [phone]  |                   |
|        |           |            |  [phone] (Fax)  |                   |
+--------+-----------+------------+----------------------+-------------------+
| / | Off-Site  | Nephrology |   Marzena Moser  |                   |
|    | Visit     |            | DO ETIENNE  [address] [place] [address]      |                   |
|        |           |            | [address]      |                   |
|        |           |            | [place]      |                   |
|        |           |            | [address]  [phone]  |                   |
|        |           |            |  [phone] (Fax)  |                   |
+--------+-----------+------------+----------------------+-------------------+
 documented as of this encounter
 
 Procedures
 
 
+------------------+--------+------------+----------------------+----------------------+
| Procedure Name   | Priori | Date/Time  | Associated Diagnosis | Comments             |
|                  | ty     |            |                      |                      |
+------------------+--------+------------+----------------------+----------------------+
| EXTERNAL LAB:    | Routin | 2018 |                      |   Results for this   |
| URINALYSIS       | e      |            |                      | procedure are in the |
|                  |        |            |                      |  results section.    |
+------------------+--------+------------+----------------------+----------------------+
| URINALYSIS WITH  | Routin | 2018 |                      |   Results for this   |
| MICROSCOPIC      | e      |            |                      | procedure are in the |
|                  |        |            |                      |  results section.    |
+------------------+--------+------------+----------------------+----------------------+
 documented in this encounter
 
 Results
 Urinalysis With Microscopic (2018)
 
+-------------+-----------+----------------+------------+--------------+
| Component   | Value     | Ref Range      | Performed  | Pathologist  |
|             |           |                | At         | Signature    |
+-------------+-----------+----------------+------------+--------------+
| WBC UA      | 50        | /HPF           |            |              |
+-------------+-----------+----------------+------------+--------------+
| Color,      | Sisi (A) | Light Yellow,  |            |              |
| Urine       |           | Yellow         |            |              |
+-------------+-----------+----------------+------------+--------------+
| Clarity     | Cloudy    |                |            |              |
+-------------+-----------+----------------+------------+--------------+
| BACTERIA UA | 4+ (A)    | Negative /HPF  |            |              |
+-------------+-----------+----------------+------------+--------------+
| SQUAMOUS    | 1         | /HPF           |            |              |
| EPITHELIAL  |           |                |            |              |
| UA          |           |                |            |              |
+-------------+-----------+----------------+------------+--------------+
| Nitrite,    | Negative  | Negative       |            |              |
| Urine       |           |                |            |              |
+-------------+-----------+----------------+------------+--------------+
 
 
 
+----------+
| Specimen |
+----------+
| Urine    |
 
+----------+
 External Lab: Urinalysis (2018)
 
+-------------+----------+-----------+------------+--------------+
| Component   | Value    | Ref Range | Performed  | Pathologist  |
|             |          |           | At         | Signature    |
+-------------+----------+-----------+------------+--------------+
| UA Blood,   | negative |           | EXTERNAL   |              |
| External    |          |           | LAB        |              |
+-------------+----------+-----------+------------+--------------+
| UA Glucose, | normal   |           | EXTERNAL   |              |
|  External   |          |           | LAB        |              |
+-------------+----------+-----------+------------+--------------+
| UA Ketones, | negative |           | EXTERNAL   |              |
|  External   |          |           | LAB        |              |
+-------------+----------+-----------+------------+--------------+
| UA Ph,      | 5        |           | EXTERNAL   |              |
| External    |          |           | LAB        |              |
+-------------+----------+-----------+------------+--------------+
| UA          | 100 (A)  | 0         | EXTERNAL   |              |
| Proteins,   |          |           | LAB        |              |
| External    |          |           |            |              |
+-------------+----------+-----------+------------+--------------+
| UA RBC,     | 5 (A)    | 0         | EXTERNAL   |              |
| External    |          |           | LAB        |              |
+-------------+----------+-----------+------------+--------------+
| UA Specific | 1.017    |           | EXTERNAL   |              |
|  Gravity,   |          |           | LAB        |              |
| External    |          |           |            |              |
+-------------+----------+-----------+------------+--------------+
| UA          | large    |           | EXTERNAL   |              |
| Leukocyte   |          |           | LAB        |              |
| Esterase,   |          |           |            |              |
| External    |          |           |            |              |
+-------------+----------+-----------+------------+--------------+
 
 
 
+----------------+---------+--------------------+--------------+
| Performing     | Address | City/State/Zipcode | Phone Number |
| Organization   |         |                    |              |
+----------------+---------+--------------------+--------------+
|   EXTERNAL LAB |         |                    |              |
+----------------+---------+--------------------+--------------+
 documented in this encounter
 
 Visit Diagnoses
 Not on filedocumented in this encounter

## 2020-01-08 NOTE — XMS
Encounter Summary
  Created on: 2020
 
 Viviana Ricci
 External Reference #: [id number]
 : 47
 Sex: Female
 
 Demographics
 
 
+-----------------------+----------------------+
| Address               | [address]      |
|                       | [place]  [address] |
+-----------------------+----------------------+
| Home Phone            | [phone]      |
+-----------------------+----------------------+
| Preferred Language    | Unknown              |
+-----------------------+----------------------+
| Marital Status        | Single               |
+-----------------------+----------------------+
| Confucianist Affiliation | [id number]                 |
+-----------------------+----------------------+
| Race                  | Unknown              |
+-----------------------+----------------------+
| Ethnic Group          | Unknown              |
+-----------------------+----------------------+
 
 
 Author
 
 
+--------------+--------------------------------------------+
| Author       | [facility]  |
|              | and [place]                                |
+--------------+--------------------------------------------+
| Organization | [facility]  |
|              | and [place]                                |
+--------------+--------------------------------------------+
| Address      | Unknown                                    |
+--------------+--------------------------------------------+
| Phone        | Unavailable                                |
+--------------+--------------------------------------------+
 
 
 
 Support
 
 
+----------------+--------------+---------------------+-----------------+
| Name           | Relationship | Address             | Phone           |
+----------------+--------------+---------------------+-----------------+
| Ada/Ed Radhames | ECON         | [address]              | [phone] |
|                |              | [place]  |                 |
|                |              |  [address]              |                 |
+----------------+--------------+---------------------+-----------------+
 
 
 
 
 Care Team Providers
 
 
+-----------------------+------+-------------+
| Care Team Member Name | Role | Phone       |
+-----------------------+------+-------------+
 PCP  | Unavailable |
+-----------------------+------+-------------+
 
 
 
 Encounter Details
 
 
+--------+----------+----------------------+----------------------+-------------+
| Date   | Type     | Department           | Care Team            | Description |
+--------+----------+----------------------+----------------------+-------------+
| / | Abstract |   [facility]  |   Marzena Moser  |             |
|    |          | Care  [address]  | M, DO  [address]      |             |
|        |          | [address]  [place],   | [address]      |             |
|        |          | [place] [address]        | [place]      |             |
|        |          | [phone]         | [address]  [phone]  |             |
|        |          |                      |  [phone] (Fax)  |             |
+--------+----------+----------------------+----------------------+-------------+
 
 
 
 Social History
 
 
+--------------+-------+-----------+--------+------+
| Tobacco Use  | Types | Packs/Day | Years  | Date |
|              |       |           | Used   |      |
+--------------+-------+-----------+--------+------+
| Never Smoker |       |           |        |      |
+--------------+-------+-----------+--------+------+
 
 
 
+---------------------+---+---+---+
| Smokeless Tobacco:  |   |   |   |
| Never Used          |   |   |   |
+---------------------+---+---+---+
 
 
 
+-------------+-------------+---------+----------+
| Alcohol Use | Drinks/Week | oz/Week | Comments |
+-------------+-------------+---------+----------+
| No          |             |         |          |
+-------------+-------------+---------+----------+
 
 
 
+------------------+---------------+
| Sex Assigned at  | Date Recorded |
| Birth            |               |
+------------------+---------------+
| Not on file      |               |
 
+------------------+---------------+
 
 
 
+----------------+-------------+-------------+
| Job Start Date | Occupation  | Industry    |
+----------------+-------------+-------------+
| Not on file    | Not on file | Not on file |
+----------------+-------------+-------------+
 
 
 
+----------------+--------------+------------+
| Travel History | Travel Start | Travel End |
+----------------+--------------+------------+
 
 
 
+-------------------------------------+
| No recent travel history available. |
+-------------------------------------+
 documented as of this encounter
 
 Plan of Treatment
 
 
+--------+-----------+------------+----------------------+-------------------+
| Date   | Type      | Specialty  | Care Team            | Description       |
+--------+-----------+------------+----------------------+-------------------+
| / | Office    | Cardiology |   Tonia Wilson,    |                   |
|    | Visit     |            | ARNP  [address]   |                   |
|        |           |            | St [place]  |                   |
|        |           |            | [address] [phone]  |                   |
|        |           |            |  [phone] (Fax)  |                   |
+--------+-----------+------------+----------------------+-------------------+
| / | Hospital  | Radiology  |   Popeye Townsend, |                   |
|    | Encounter |            |  MD  [address] |                   |
|        |           |            |  [place]. [place]   |                   |
|        |           |            | [place] [address]             |                   |
|        |           |            | [phone]         |                   |
|        |           |            | [phone] (Fax)   |                   |
+--------+-----------+------------+----------------------+-------------------+
| / | Surgery   | Radiology  |   CaryYinmarissa, | CV EP PPM SYSTEM  |
2020   |           |            |  MD  [address] | IMPLANT           |
|        |           |            |  [place]. [place],   |                   |
|        |           |            | [place] [address]             |                   |
|        |           |            | [phone]         |                   |
|        |           |            | [phone] (Fax)   |                   |
+--------+-----------+------------+----------------------+-------------------+
| 02/10/ | Clinical  | Cardiology |                      |                   |
|    | Support   |            |                      |                   |
+--------+-----------+------------+----------------------+-------------------+
| / | Office    | Cardiology |   Tonia Wilson,    |                   |
2020   | Visit     |            | BELKIS  [address]   |                   |
|        |           |            |   [place]  |                   |
|        |           |            | [address]  [phone]  |                   |
|        |           |            |  [phone] (Fax)  |                   |
+--------+-----------+------------+----------------------+-------------------+
| / | Off-Site  | Nephrology |   Marzena Moser  |                   |
2020   | Visit     |            | DO [address]      |                   |
 
|        |           |            | [address]      |                   |
|        |           |            | [place]      |                   |
|        |           |            | [address]  [phone]  |                   |
|        |           |            |  [phone] (Fax)  |                   |
+--------+-----------+------------+----------------------+-------------------+
 documented as of this encounter
 
 Procedures
 
 
+---------------------+--------+------------+----------------------+----------------------+
| Procedure Name      | Priori | Date/Time  | Associated Diagnosis | Comments             |
|                     | ty     |            |                      |                      |
+---------------------+--------+------------+----------------------+----------------------+
| TACROLIMUS ()  | Routin | 2013 |                      |   Results for this   |
| TROUGH              | e      |            |                      | procedure are in the |
|                     |        |            |                      |  results section.    |
+---------------------+--------+------------+----------------------+----------------------+
 documented in this encounter
 
 Results
 Tacrolimus () Level (2013)
 
+-------------+-------+-----------+------------+--------------+
| Component   | Value | Ref Range | Performed  | Pathologist  |
|             |       |           | At         | Signature    |
+-------------+-------+-----------+------------+--------------+
| Tacrolimus  | 9.2   |           |            |              |
| Level       |       |           |            |              |
+-------------+-------+-----------+------------+--------------+
 
 
 
+-----------------+
| Specimen        |
+-----------------+
| Blood specimen  |
| (specimen)      |
+-----------------+
 documented in this encounter
 
 Visit Diagnoses
 Not on filedocumented in this encounter

## 2020-01-08 NOTE — XMS
Encounter Summary
  Created on: 2020
 
 Viviana Ricci
 External Reference #: [id number]
 : 47
 Sex: Female
 
 Demographics
 
 
+-----------------------+----------------------+
| Address               | [address]      |
|                       | [place]  [address] |
+-----------------------+----------------------+
| Home Phone            | [phone]      |
+-----------------------+----------------------+
| Preferred Language    | Unknown              |
+-----------------------+----------------------+
| Marital Status        | Single               |
+-----------------------+----------------------+
| Evangelical Affiliation | 1009                 |
+-----------------------+----------------------+
| Race                  | Unknown              |
+-----------------------+----------------------+
| Ethnic Group          | Unknown              |
+-----------------------+----------------------+
 
 
 Author
 
 
+--------------+--------------------------------------------+
| Author       | [facility]  |
|              | and [place]                                |
+--------------+--------------------------------------------+
| Organization | [facility]  |
|              | and [place]                                |
+--------------+--------------------------------------------+
| Address      | Unknown                                    |
+--------------+--------------------------------------------+
| Phone        | Unavailable                                |
+--------------+--------------------------------------------+
 
 
 
 Support
 
 
+----------------+--------------+---------------------+-----------------+
| Name           | Relationship | Address             | Phone           |
+----------------+--------------+---------------------+-----------------+
| Ada/Ed Radhames | ECON         | [address]              | [phone] |
|                |              | [place]  |                 |
|                |              |  [address]              |                 |
+----------------+--------------+---------------------+-----------------+
 
 
 
 
 Care Team Providers
 
 
+-----------------------+------+-------------+
| Care Team Member Name | Role | Phone       |
+-----------------------+------+-------------+
 PCP  | Unavailable |
+-----------------------+------+-------------+
 
 
 
 Encounter Details
 
 
+--------+-------------+---------------------+----------------------+----------------------+
| Date   | Type        | Department          | Care Team            | Description          |
+--------+-------------+---------------------+----------------------+----------------------+
| / | Orders Only |   [facility]         |   Marzena Moser  | Secondary            |
| 2015   |             | NEPHROLOGY  [address]   | M, DO  [address]      | hyperparathyroidism  |
|        |             | [address]   | [place]      | (Primary Dx); Kidney |
|        |             | [place], [place]     | [place], [place]      |  replaced by         |
|        |             | [address]          | [address]  [phone]  | transplant; Diabetes |
|        |             | [phone]        |  [phone] (Fax)  |  mellitus type II,   |
|        |             |                     |                      | uncontrolled (HCC);  |
|        |             |                     |                      | Essential            |
|        |             |                     |                      | hypertension         |
+--------+-------------+---------------------+----------------------+----------------------+
 
 
 
 Social History
 
 
+--------------+-------+-----------+--------+------+
| Tobacco Use  | Types | Packs/Day | Years  | Date |
|              |       |           | Used   |      |
+--------------+-------+-----------+--------+------+
| Never Smoker |       |           |        |      |
+--------------+-------+-----------+--------+------+
 
 
 
+---------------------+---+---+---+
| Smokeless Tobacco:  |   |   |   |
| Never Used          |   |   |   |
+---------------------+---+---+---+
 
 
 
+---------------------------------------------------------------+
| Comments: some second hand smoke exposure, but fairly minimal |
+---------------------------------------------------------------+
 
 
 
+-------------+-------------+---------+----------+
| Alcohol Use | Drinks/Week | oz/Week | Comments |
+-------------+-------------+---------+----------+
| No          |             |         |          |
 
+-------------+-------------+---------+----------+
 
 
 
+------------------+---------------+
| Sex Assigned at  | Date Recorded |
| Birth            |               |
+------------------+---------------+
| Not on file      |               |
+------------------+---------------+
 
 
 
+----------------+-------------+-------------+
| Job Start Date | Occupation  | Industry    |
+----------------+-------------+-------------+
| Not on file    | Not on file | Not on file |
+----------------+-------------+-------------+
 
 
 
+----------------+--------------+------------+
| Travel History | Travel Start | Travel End |
+----------------+--------------+------------+
 
 
 
+-------------------------------------+
| No recent travel history available. |
+-------------------------------------+
 documented as of this encounter
 
 Progress Cherelle Zhou RN - 2015 11:28 AM PSTStanding transplant lab order for nephro
logy  faxed to [facility].Electronically signed by Cherelel Julian RN at 2015 11:
32 AM PSTdocumented in this encounter
 
 Plan of Treatment
 
 
+--------+-----------+------------+----------------------+-------------------+
| Date   | Type      | Specialty  | Care Team            | Description       |
+--------+-----------+------------+----------------------+-------------------+
| / | Office    | Cardiology |   Tonia Wilson,    |                   |
|    | Visit     |            | ARNJESSICA  [address]   |                   |
|        |           |            | St [place], [place]  |                   |
|        |           |            | [address]  [phone]  |                   |
|        |           |            |  [phone] (Fax)  |                   |
+--------+-----------+------------+----------------------+-------------------+
| / | Hospital  | Radiology  |   Popeye Townsend, |                   |
|    | Encounter |            |  MD  [address] |                   |
|        |           |            |  [place],   |                   |
|        |           |            | [place] [address]             |                   |
|        |           |            | [phone]         |                   |
|        |           |            | [phone] (Fax)   |                   |
+--------+-----------+------------+----------------------+-------------------+
| / | Surgery   | Radiology  |   Popeye Townsend, | CV EP PPM SYSTEM  |
|    |           |            |  MD  [address] | IMPLANT           |
|        |           |            |  St.  [place],   |                   |
|        |           |            | [place] [address]             |                   |
 
|        |           |            | [phone]         |                   |
|        |           |            | [phone] (Fax)   |                   |
+--------+-----------+------------+----------------------+-------------------+
| 02/10/ | Clinical  | Cardiology |                      |                   |
|    | Support   |            |                      |                   |
+--------+-----------+------------+----------------------+-------------------+
| / | Office    | Cardiology |   RakeshTonia andre,    |                   |
|    | Visit     |            | BELKIS  [address]   |                   |
|        |           |            | [place]  |                   |
|        |           |            | [address]  [phone]  |                   |
|        |           |            |  [phone] (Fax)  |                   |
+--------+-----------+------------+----------------------+-------------------+
| / | Off-Site  | Nephrology |   Marzena Moser  |                   |
|    | Visit     |            | DO ETIENNE  [address] [place] [address]      |                   |
|        |           |            | [address]      |                   |
|        |           |            | [place]      |                   |
|        |           |            | [address]  [phone]  |                   |
|        |           |            |  [phone] (Fax)  |                   |
+--------+-----------+------------+----------------------+-------------------+
 documented as of this encounter
 
 Visit Diagnoses
 
 
+----------------------------------------------------------------------------------------+
| Diagnosis                                                                              |
+----------------------------------------------------------------------------------------+
|   Secondary hyperparathyroidism - Primary  Secondary hyperparathyroidism (of renal     |
| origin)                                                                                |
+----------------------------------------------------------------------------------------+
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
+----------------------------------------------------------------------------------------+
|   Diabetes mellitus type II, uncontrolled (HCC)  Type II or unspecified type diabetes  |
| mellitus without mention of complication, uncontrolled                                 |
+----------------------------------------------------------------------------------------+
|   Essential hypertension  Unspecified essential hypertension                           |
+----------------------------------------------------------------------------------------+
 documented in this encounter

## 2020-01-08 NOTE — XMS
Encounter Summary
  Created on: 2020
 
 Viviana Ricci
 External Reference #: [id number]
 : 47
 Sex: Female
 
 Demographics
 
 
+-----------------------+----------------------+
| Address               | [address]      |
|                       | [place]  [address] |
+-----------------------+----------------------+
| Home Phone            | [phone]      |
+-----------------------+----------------------+
| Preferred Language    | Unknown              |
+-----------------------+----------------------+
| Marital Status        | Single               |
+-----------------------+----------------------+
| Anabaptism Affiliation | [id number]                 |
+-----------------------+----------------------+
| Race                  | Unknown              |
+-----------------------+----------------------+
| Ethnic Group          | Unknown              |
+-----------------------+----------------------+
 
 
 Author
 
 
+--------------+--------------------------------------------+
| Author       | [facility]  |
|              | and [facility]                                |
+--------------+--------------------------------------------+
| Organization | [facility]  |
|              | and [facility]                                |
+--------------+--------------------------------------------+
| Address      | Unknown                                    |
+--------------+--------------------------------------------+
| Phone        | Unavailable                                |
+--------------+--------------------------------------------+
 
 
 
 Support
 
 
+----------------+--------------+---------------------+-----------------+
| Name           | Relationship | Address             | Phone           |
+----------------+--------------+---------------------+-----------------+
| Ada/Ed Radhames | ECON         | [address]              | [phone] |
|                |              | [place]  |                 |
|                |              |  [id number]              |                 |
+----------------+--------------+---------------------+-----------------+
 
 
 
 
 Care Team Providers
 
 
+-----------------------+------+-------------+
| Care Team Member Name | Role | Phone       |
+-----------------------+------+-------------+
 PCP  | Unavailable |
+-----------------------+------+-------------+
 
 
 
 Encounter Details
 
 
+--------+----------+---------------------+----------------------+-------------+
| Date   | Type     | Department          | Care Team            | Description |
+--------+----------+---------------------+----------------------+-------------+
| / | Abstract |   [facility]  [place]         |   Marzena Moser  |             |
|    |          | NEPHROLOGY  [address]   | M, DO  [address]      |             |
|        |          | [address]   | [address]      |             |
|        |          | [place], [place]     | [place]      |             |
|        |          | [address]          | [address]  [phone]  |             |
|        |          | [phone]        |  [phone] (Fax)  |             |
+--------+----------+---------------------+----------------------+-------------+
 
 
 
 Social History
 
 
+--------------+-------+-----------+--------+------+
| Tobacco Use  | Types | Packs/Day | Years  | Date |
|              |       |           | Used   |      |
+--------------+-------+-----------+--------+------+
| Never Smoker |       |           |        |      |
+--------------+-------+-----------+--------+------+
 
 
 
+---------------------+---+---+---+
| Smokeless Tobacco:  |   |   |   |
| Never Used          |   |   |   |
+---------------------+---+---+---+
 
 
 
+---------------------------------------------------------------+
| Comments: some second hand smoke exposure, but fairly minimal |
+---------------------------------------------------------------+
 
 
 
+-------------+-------------+---------+----------+
| Alcohol Use | Drinks/Week | oz/Week | Comments |
+-------------+-------------+---------+----------+
| No          |             |         |          |
+-------------+-------------+---------+----------+
 
 
 
 
+------------------+---------------+
| Sex Assigned at  | Date Recorded |
| Birth            |               |
+------------------+---------------+
| Not on file      |               |
+------------------+---------------+
 
 
 
+----------------+-------------+-------------+
| Job Start Date | Occupation  | Industry    |
+----------------+-------------+-------------+
| Not on file    | Not on file | Not on file |
+----------------+-------------+-------------+
 
 
 
+----------------+--------------+------------+
| Travel History | Travel Start | Travel End |
+----------------+--------------+------------+
 
 
 
+-------------------------------------+
| No recent travel history available. |
+-------------------------------------+
 documented as of this encounter
 
 Plan of Treatment
 
 
+--------+-----------+------------+----------------------+-------------------+
| Date   | Type      | Specialty  | Care Team            | Description       |
+--------+-----------+------------+----------------------+-------------------+
| / | Office    | Cardiology |   Tonia Wilson,    |                   |
|    | Visit     |            | ARNJESSICA  [address]   |                   |
|        |           |            | [place]  |                   |
|        |           |            | [address]  [phone]  |                   |
|        |           |            |  [phone] (Fax)  |                   |
+--------+-----------+------------+----------------------+-------------------+
| / | Hospital  | Radiology  |   Popeye Townsend, |                   |
|    | Encounter |            |  MD  [address] |                   |
|        |           |            |  St.  [place],   |                   |
|        |           |            | [place] [address]             |                   |
|        |           |            | [phone]         |                   |
|        |           |            | [phone] (Fax)   |                   |
+--------+-----------+------------+----------------------+-------------------+
| / | Surgery   | Radiology  |   Popeye Townsend, | CV EP PPM SYSTEM  |
|    |           |            |  MD  [address] | IMPLANT           |
|        |           |            |  St.  [place],   |                   |
|        |           |            | [place] [address]             |                   |
|        |           |            | [phone]         |                   |
|        |           |            | [phone] (Fax)   |                   |
+--------+-----------+------------+----------------------+-------------------+
| 02/10/ | Clinical  | Cardiology |                      |                   |
|    | Support   |            |                      |                   |
+--------+-----------+------------+----------------------+-------------------+
| / | Office    | Cardiology |   Tonia Wilson,    |                   |
|    | Visit     |            | ARNP  [address]   |                   |
 
|        |           |            | St  [place], [place]  |                   |
|        |           |            | [address]  [phone]  |                   |
|        |           |            |  [phone] (Fax)  |                   |
+--------+-----------+------------+----------------------+-------------------+
| / | Off-Site  | Nephrology |   Marzena Moser  |                   |
|    | Visit     |            | DO ETIENNE  [address] [place] [address]      |                   |
|        |           |            | [address]      |                   |
|        |           |            | [place]      |                   |
|        |           |            | [address]  [phone]  |                   |
|        |           |            |  [phone] (Fax)  |                   |
+--------+-----------+------------+----------------------+-------------------+
 documented as of this encounter
 
 Procedures
 
 
+--------------------+--------+------------+----------------------+----------------------+
| Procedure Name     | Priori | Date/Time  | Associated Diagnosis | Comments             |
|                    | ty     |            |                      |                      |
+--------------------+--------+------------+----------------------+----------------------+
| EXTERNAL LAB: BILLY  | Routin | 2014 |                      |   Results for this   |
|                    | e      |            |                      | procedure are in the |
|                    |        |            |                      |  results section.    |
+--------------------+--------+------------+----------------------+----------------------+
| EXTERNAL LAB: AST  | Routin | 2014 |                      |   Results for this   |
|                    | e      |            |                      | procedure are in the |
|                    |        |            |                      |  results section.    |
+--------------------+--------+------------+----------------------+----------------------+
| EXTERNAL LAB: ALT  | Routin | 2014 |                      |   Results for this   |
|                    | e      |            |                      | procedure are in the |
|                    |        |            |                      |  results section.    |
+--------------------+--------+------------+----------------------+----------------------+
| EXTERNAL LAB:      | Routin | 2014 |                      |   Results for this   |
| TRIGLYCERIDES      | e      |            |                      | procedure are in the |
|                    |        |            |                      |  results section.    |
+--------------------+--------+------------+----------------------+----------------------+
| EXTERNAL LAB:      | Routin | 2014 |                      |   Results for this   |
| CHOLESTEROL, HDL   | e      |            |                      | procedure are in the |
|                    |        |            |                      |  results section.    |
+--------------------+--------+------------+----------------------+----------------------+
| EXTERNAL LAB:      | Routin | 2014 |                      |   Results for this   |
| CHOLESTEROL, TOTAL | e      |            |                      | procedure are in the |
|                    |        |            |                      |  results section.    |
+--------------------+--------+------------+----------------------+----------------------+
| EXTERNAL LAB:      | Routin | 2014 |                      |   Results for this   |
| CHOLESTEROL, LDL   | e      |            |                      | procedure are in the |
|                    |        |            |                      |  results section.    |
+--------------------+--------+------------+----------------------+----------------------+
| EXTERNAL LAB: EGFR | Routin | 2014 |                      |   Results for this   |
|                    | e      |            |                      | procedure are in the |
|                    |        |            |                      |  results section.    |
+--------------------+--------+------------+----------------------+----------------------+
| EXTERNAL LAB:      | Routin | 2014 |                      |   Results for this   |
| CREATININE         | e      |            |                      | procedure are in the |
|                    |        |            |                      |  results section.    |
+--------------------+--------+------------+----------------------+----------------------+
| EXTERNAL LAB:      | Routin | 2014 |                      |   Results for this   |
| HEMOGLOBIN A1C     | e      |            |                      | procedure are in the |
|                    |        |            |                      |  results section.    |
+--------------------+--------+------------+----------------------+----------------------+
 
| CMPI               | Routin | 2014 |                      |   Results for this   |
|                    | e      |            |                      | procedure are in the |
|                    |        |            |                      |  results section.    |
+--------------------+--------+------------+----------------------+----------------------+
 documented in this encounter
 
 Results
 CMP/ISTAT (2014)
 
+-------------+-------+-----------------+------------+--------------+
| Component   | Value | Ref Range       | Performed  | Pathologist  |
|             |       |                 | At         | Signature    |
+-------------+-------+-----------------+------------+--------------+
| Na          | 141   | mmol/L          |            |              |
+-------------+-------+-----------------+------------+--------------+
| K           | 5.1   | mmol/L          |            |              |
+-------------+-------+-----------------+------------+--------------+
| Cl          | 109   | mmol/L          |            |              |
+-------------+-------+-----------------+------------+--------------+
| CO2         | 22    | mmol/L          |            |              |
+-------------+-------+-----------------+------------+--------------+
| BUN         | 46    | mg/dL           |            |              |
+-------------+-------+-----------------+------------+--------------+
| Glucose     | 133   | mg/dL           |            |              |
+-------------+-------+-----------------+------------+--------------+
| Calcium     | 9.7   | mg/dL           |            |              |
+-------------+-------+-----------------+------------+--------------+
| Phosphorus  | 3.3   | 2.6 - 4.4 mg/dL |            |              |
+-------------+-------+-----------------+------------+--------------+
| Magnesium   | 1.8   | mg/dL           |            |              |
+-------------+-------+-----------------+------------+--------------+
| MCV         | 102.4 | fL              |            |              |
+-------------+-------+-----------------+------------+--------------+
| Bilirubin   | 0.4   | mg/dL           |            |              |
| Total       |       |                 |            |              |
+-------------+-------+-----------------+------------+--------------+
| Alkaline    | 95    | U/L             |            |              |
| Phosphatase |       |                 |            |              |
+-------------+-------+-----------------+------------+--------------+
| Albumin     | 3.7   | 3.3 - 4.8 g/dL  |            |              |
+-------------+-------+-----------------+------------+--------------+
| Tacrolimus  | 5.2   |                 |            |              |
| Level       |       |                 |            |              |
+-------------+-------+-----------------+------------+--------------+
 
 
 
+-----------------+
| Specimen        |
+-----------------+
| Blood specimen  |
| (specimen)      |
+-----------------+
 External Lab: CBC (2014)
 
+-----------+-------+-----------+------------+--------------+
| Component | Value | Ref Range | Performed  | Pathologist  |
|           |       |           | At         | Signature    |
+-----------+-------+-----------+------------+--------------+
| WBC,      | 5.9   | 4 - 11    | EXTERNAL   |              |
 
| External  |       |           | LAB        |              |
+-----------+-------+-----------+------------+--------------+
| HGB,      | 12.6  | 12 - 16   | EXTERNAL   |              |
| External  |       |           | LAB        |              |
+-----------+-------+-----------+------------+--------------+
| HCT,      | 39.2  | 35 - 45   | EXTERNAL   |              |
| External  |       |           | LAB        |              |
+-----------+-------+-----------+------------+--------------+
| PLT,      | 175   | 140 - 440 | EXTERNAL   |              |
| External  |       |           | LAB        |              |
+-----------+-------+-----------+------------+--------------+
 
 
 
+--------------------------+
| Resulting Agency Comment |
+--------------------------+
|   [facility]              |
+--------------------------+
 
 
 
+----------------+---------+--------------------+--------------+
| Performing     | Address | City/State/Zipcode | Phone Number |
| Organization   |         |                    |              |
+----------------+---------+--------------------+--------------+
|   EXTERNAL LAB |         |                    |              |
+----------------+---------+--------------------+--------------+
 External Lab: AST (2014)
 
+-----------+-------+-----------+------------+--------------+
| Component | Value | Ref Range | Performed  | Pathologist  |
|           |       |           | At         | Signature    |
+-----------+-------+-----------+------------+--------------+
| AST,      | 24    | 0 - 40    | EXTERNAL   |              |
| External  |       |           | LAB        |              |
+-----------+-------+-----------+------------+--------------+
 
 
 
+-----------------+
| Specimen        |
+-----------------+
| Blood specimen  |
| (specimen)      |
+-----------------+
 
 
 
+--------------------------+
| Resulting Agency Comment |
+--------------------------+
|   [facility]              |
+--------------------------+
 
 
 
+----------------+---------+--------------------+--------------+
| Performing     | Address | City/State/Zipcode | Phone Number |
| Organization   |         |                    |              |
 
+----------------+---------+--------------------+--------------+
|   EXTERNAL LAB |         |                    |              |
+----------------+---------+--------------------+--------------+
 External Lab: ALT (2014)
 
+-----------+-------+-----------+------------+--------------+
| Component | Value | Ref Range | Performed  | Pathologist  |
|           |       |           | At         | Signature    |
+-----------+-------+-----------+------------+--------------+
| ALT,      | 17    | 0 - 40    | EXTERNAL   |              |
| External  |       |           | LAB        |              |
+-----------+-------+-----------+------------+--------------+
 
 
 
+-----------------+
| Specimen        |
+-----------------+
| Blood specimen  |
| (specimen)      |
+-----------------+
 
 
 
+--------------------------+
| Resulting Agency Comment |
+--------------------------+
|   [facility]              |
+--------------------------+
 
 
 
+----------------+---------+--------------------+--------------+
| Performing     | Address | City/State/Zipcode | Phone Number |
| Organization   |         |                    |              |
+----------------+---------+--------------------+--------------+
|   EXTERNAL LAB |         |                    |              |
+----------------+---------+--------------------+--------------+
 External Lab: Triglycerides (2014)
 
+-------------+---------+-----------+------------+--------------+
| Component   | Value   | Ref Range | Performed  | Pathologist  |
|             |         |           | At         | Signature    |
+-------------+---------+-----------+------------+--------------+
| Triglycerid | 186 (A) | 150       | EXTERNAL   |              |
| es,         |         |           | LAB        |              |
| External    |         |           |            |              |
+-------------+---------+-----------+------------+--------------+
 
 
 
+-----------------+
| Specimen        |
+-----------------+
| Blood specimen  |
| (specimen)      |
+-----------------+
 
 
 
 
+--------------------------+
| Resulting Agency Comment |
+--------------------------+
|   [facility]              |
+--------------------------+
 
 
 
+----------------+---------+--------------------+--------------+
| Performing     | Address | City/State/Zipcode | Phone Number |
| Organization   |         |                    |              |
+----------------+---------+--------------------+--------------+
|   EXTERNAL LAB |         |                    |              |
+----------------+---------+--------------------+--------------+
 External Lab: Cholesterol, HDL (2014)
 
+-------------+-------+-----------+------------+--------------+
| Component   | Value | Ref Range | Performed  | Pathologist  |
|             |       |           | At         | Signature    |
+-------------+-------+-----------+------------+--------------+
| HDL         | 52.6  | 40        | EXTERNAL   |              |
| Cholesterol |       |           | LAB        |              |
| , External  |       |           |            |              |
+-------------+-------+-----------+------------+--------------+
 
 
 
+-----------------+
| Specimen        |
+-----------------+
| Blood specimen  |
| (specimen)      |
+-----------------+
 
 
 
+--------------------------+
| Resulting Agency Comment |
+--------------------------+
|   [facility]              |
+--------------------------+
 
 
 
+----------------+---------+--------------------+--------------+
| Performing     | Address | City/State/Zipcode | Phone Number |
| Organization   |         |                    |              |
+----------------+---------+--------------------+--------------+
|   EXTERNAL LAB |         |                    |              |
+----------------+---------+--------------------+--------------+
 External Lab: Cholesterol, Total (2014)
 
+-------------+-------+-----------+------------+--------------+
| Component   | Value | Ref Range | Performed  | Pathologist  |
|             |       |           | At         | Signature    |
+-------------+-------+-----------+------------+--------------+
| Cholesterol | 166   | 200       | EXTERNAL   |              |
| , Total,    |       |           | LAB        |              |
| External    |       |           |            |              |
+-------------+-------+-----------+------------+--------------+
 
 
 
 
+-----------------+
| Specimen        |
+-----------------+
| Blood specimen  |
| (specimen)      |
+-----------------+
 
 
 
+--------------------------+
| Resulting Agency Comment |
+--------------------------+
|   [facility]              |
+--------------------------+
 
 
 
+----------------+---------+--------------------+--------------+
| Performing     | Address | City/State/Zipcode | Phone Number |
| Organization   |         |                    |              |
+----------------+---------+--------------------+--------------+
|   EXTERNAL LAB |         |                    |              |
+----------------+---------+--------------------+--------------+
 External Lab: Cholesterol, LDL (2014)
 
+-------------+-------+-----------+------------+--------------+
| Component   | Value | Ref Range | Performed  | Pathologist  |
|             |       |           | At         | Signature    |
+-------------+-------+-----------+------------+--------------+
| LDL         | 76    | 100       | EXTERNAL   |              |
| Cholesterol |       |           | LAB        |              |
| , Direct,   |       |           |            |              |
| External    |       |           |            |              |
+-------------+-------+-----------+------------+--------------+
 
 
 
+-----------------+
| Specimen        |
+-----------------+
| Blood specimen  |
| (specimen)      |
+-----------------+
 
 
 
+--------------------------+
| Resulting Agency Comment |
+--------------------------+
|   [facility]              |
+--------------------------+
 
 
 
+----------------+---------+--------------------+--------------+
| Performing     | Address | City/State/Zipcode | Phone Number |
| Organization   |         |                    |              |
 
+----------------+---------+--------------------+--------------+
|   EXTERNAL LAB |         |                    |              |
+----------------+---------+--------------------+--------------+
 External Lab: eGFR (2014)
 
+------------+--------+-----------+------------+--------------+
| Component  | Value  | Ref Range | Performed  | Pathologist  |
|            |        |           | At         | Signature    |
+------------+--------+-----------+------------+--------------+
| eGFR,      | 31 (A) | 60        | EXTERNAL   |              |
| External   |        |           | LAB        |              |
+------------+--------+-----------+------------+--------------+
| eGFR,      |        |           | EXTERNAL   |              |
|     |        |           | LAB        |              |
| [facility],  |        |           |            |              |
| External   |        |           |            |              |
+------------+--------+-----------+------------+--------------+
 
 
 
+-----------------+
| Specimen        |
+-----------------+
| Blood specimen  |
| (specimen)      |
+-----------------+
 
 
 
+--------------------------+
| Resulting Agency Comment |
+--------------------------+
|   [facility]              |
+--------------------------+
 
 
 
+----------------+---------+--------------------+--------------+
| Performing     | Address | City/State/Zipcode | Phone Number |
| Organization   |         |                    |              |
+----------------+---------+--------------------+--------------+
|   EXTERNAL LAB |         |                    |              |
+----------------+---------+--------------------+--------------+
 External Lab: Creatinine (2014)
 
+-------------+----------+-----------+------------+--------------+
| Component   | Value    | Ref Range | Performed  | Pathologist  |
|             |          |           | At         | Signature    |
+-------------+----------+-----------+------------+--------------+
| Creatinine, | 1.66 (A) | 0.6 - 1.3 | EXTERNAL   |              |
|  External   |          |           | LAB        |              |
+-------------+----------+-----------+------------+--------------+
 
 
 
+-----------------+
| Specimen        |
+-----------------+
| Blood specimen  |
| (specimen)      |
 
+-----------------+
 
 
 
+--------------------------+
| Resulting Agency Comment |
+--------------------------+
|   [facility]              |
+--------------------------+
 
 
 
+----------------+---------+--------------------+--------------+
| Performing     | Address | City/State/Zipcode | Phone Number |
| Organization   |         |                    |              |
+----------------+---------+--------------------+--------------+
|   EXTERNAL LAB |         |                    |              |
+----------------+---------+--------------------+--------------+
 External Lab: Hemoglobin A1c (2014)
 
+-------------+---------+-----------+------------+--------------+
| Component   | Value   | Ref Range | Performed  | Pathologist  |
|             |         |           | At         | Signature    |
+-------------+---------+-----------+------------+--------------+
| Hemoglobin  | 7.3 (A) | 6.4       | EXTERNAL   |              |
| A1c,        |         |           | LAB        |              |
| external    |         |           |            |              |
+-------------+---------+-----------+------------+--------------+
 
 
 
+-----------------+
| Specimen        |
+-----------------+
| Blood specimen  |
| (specimen)      |
+-----------------+
 
 
 
+--------------------------+
| Resulting Agency Comment |
+--------------------------+
|   [facility]              |
+--------------------------+
 
 
 
+----------------+---------+--------------------+--------------+
| Performing     | Address | City/State/Zipcode | Phone Number |
| Organization   |         |                    |              |
+----------------+---------+--------------------+--------------+
|   EXTERNAL LAB |         |                    |              |
+----------------+---------+--------------------+--------------+
 documented in this encounter
 
 Visit Diagnoses
 Not on filedocumented in this encounter

## 2020-01-08 NOTE — XMS
Encounter Summary
  Created on: 2020
 
 Viviana Ricci
 External Reference #: [id number]
 : 47
 Sex: Female
 
 Demographics
 
 
+-----------------------+----------------------+
| Address               | [address]      |
|                       | [place]  [address] |
+-----------------------+----------------------+
| Home Phone            | [phone]      |
+-----------------------+----------------------+
| Preferred Language    | Unknown              |
+-----------------------+----------------------+
| Marital Status        | Single               |
+-----------------------+----------------------+
| Cheondoism Affiliation | [id number]                 |
+-----------------------+----------------------+
| Race                  | Unknown              |
+-----------------------+----------------------+
| Ethnic Group          | Unknown              |
+-----------------------+----------------------+
 
 
 Author
 
 
+--------------+--------------------------------------------+
| Author       | [facility]  |
|              | and [place]                                |
+--------------+--------------------------------------------+
| Organization | [facility]  |
|              | and [place]                                |
+--------------+--------------------------------------------+
| Address      | Unknown                                    |
+--------------+--------------------------------------------+
| Phone        | Unavailable                                |
+--------------+--------------------------------------------+
 
 
 
 Support
 
 
+----------------+--------------+---------------------+-----------------+
| Name           | Relationship | Address             | Phone           |
+----------------+--------------+---------------------+-----------------+
| Ada/Ed Radhames | ECON         | [address]              | [phone] |
|                |              | [place]  |                 |
|                |              |  [address]              |                 |
+----------------+--------------+---------------------+-----------------+
 
 
 
 
 Care Team Providers
 
 
+------------------------+------+-----------------+
| Care Team Member Name  | Role | Phone           |
+------------------------+------+-----------------+
| Marzena Moser DO | PCP  | [phone] |
+------------------------+------+-----------------+
 
 
 
 Reason for Visit
 
 
+-------------------+----------+
| Reason            | Comments |
+-------------------+----------+
| Medication Refill |          |
+-------------------+----------+
 
 
 
 Encounter Details
 
 
+--------+--------+---------------------+----------------------+-------------------+
| Date   | Type   | Department          | Care Team            | Description       |
+--------+--------+---------------------+----------------------+-------------------+
| / | Refill |   [facility]         |   Marzena Moser  | Medication Refill |
| 2018   |        | NEPHROLOGY  [address]   | M, DO  [address]      |                   |
|        |        | [address]   | [address]      |                   |
|        |        | [place], [place]     | [place], [place]      |                   |
|        |        | [address]          | [address]  [phone]  |                   |
|        |        | [phone]        |  [phone] (Fax)  |                   |
+--------+--------+---------------------+----------------------+-------------------+
 
 
 
 Social History
 
 
+--------------+-------+-----------+--------+------+
| Tobacco Use  | Types | Packs/Day | Years  | Date |
|              |       |           | Used   |      |
+--------------+-------+-----------+--------+------+
| Never Smoker |       |           |        |      |
+--------------+-------+-----------+--------+------+
 
 
 
+---------------------+---+---+---+
| Smokeless Tobacco:  |   |   |   |
| Never Used          |   |   |   |
+---------------------+---+---+---+
 
 
 
+---------------------------------------------------------------+
| Comments: some second hand smoke exposure, but fairly minimal |
 
+---------------------------------------------------------------+
 
 
 
+-------------+-------------+---------+----------+
| Alcohol Use | Drinks/Week | oz/Week | Comments |
+-------------+-------------+---------+----------+
| No          |             |         |          |
+-------------+-------------+---------+----------+
 
 
 
+------------------+---------------+
| Sex Assigned at  | Date Recorded |
| Birth            |               |
+------------------+---------------+
| Not on file      |               |
+------------------+---------------+
 
 
 
+----------------+-------------+-------------+
| Job Start Date | Occupation  | Industry    |
+----------------+-------------+-------------+
| Not on file    | Not on file | Not on file |
+----------------+-------------+-------------+
 
 
 
+----------------+--------------+------------+
| Travel History | Travel Start | Travel End |
+----------------+--------------+------------+
 
 
 
+-------------------------------------+
| No recent travel history available. |
+-------------------------------------+
 documented as of this encounter
 
 Plan of Treatment
 
 
+--------+-----------+------------+----------------------+-------------------+
| Date   | Type      | Specialty  | Care Team            | Description       |
+--------+-----------+------------+----------------------+-------------------+
| / | Office    | Cardiology |   Tonia Wilson,    |                   |
|    | Visit     |            | ARNP  [address]   |                   |
|        |           |            | St [place], [place]  |                   |
|        |           |            | [address]  [phone]  |                   |
|        |           |            |  [phone] (Fax)  |                   |
+--------+-----------+------------+----------------------+-------------------+
| / | Hospital  | Radiology  |   Popeye Townsend, |                   |
|    | Encounter |            |  MD  [address] |                   |
|        |           |            |  [place],   |                   |
|        |           |            | [place] [address]             |                   |
|        |           |            | [phone]         |                   |
|        |           |            | [phone] (Fax)   |                   |
+--------+-----------+------------+----------------------+-------------------+
| / | Surgery   | Radiology  |   Popeye Townsend, | CV EP PPM SYSTEM  |
 
|    |           |            |  MD  [address] | IMPLANT           |
|        |           |            |  [place],   |                   |
|        |           |            | [place] [address]             |                   |
|        |           |            | [phone]         |                   |
|        |           |            | [phone] (Fax)   |                   |
+--------+-----------+------------+----------------------+-------------------+
| 02/10/ | Clinical  | Cardiology |                      |                   |
|    | Support   |            |                      |                   |
+--------+-----------+------------+----------------------+-------------------+
| / | Office    | Cardiology |   Tonia Wilson,    |                   |
|    | Visit     |            | ARNP  [address]   |                   |
|        |           |            | [place]  |                   |
|        |           |            | [id number] [phone]  |                   |
|        |           |            |  [phone] (Fax)  |                   |
+--------+-----------+------------+----------------------+-------------------+
| / | Off-Site  | Nephrology |   Marzena Moser  |                   |
|    | Visit     |            | DO ETIENNE  [address] [place] [address]      |                   |
|        |           |            | [address]      |                   |
|        |           |            | [place]      |                   |
|        |           |            | [address] [phone]  |                   |
|        |           |            |  [phone] (Fax)  |                   |
+--------+-----------+------------+----------------------+-------------------+
 documented as of this encounter
 
 Visit Diagnoses
 
 
+---------------------------------+
| Diagnosis                       |
+---------------------------------+
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
+---------------------------------+
 documented in this encounter

## 2020-01-08 NOTE — XMS
Encounter Summary
  Created on: 2020
 
 Viviana Ricci
 External Reference #: [id number]
 : 47
 Sex: Female
 
 Demographics
 
 
+-----------------------+----------------------+
| Address               | [address]      |
|                       | [place]  [address] |
+-----------------------+----------------------+
| Home Phone            | [phone]      |
+-----------------------+----------------------+
| Preferred Language    | Unknown              |
+-----------------------+----------------------+
| Marital Status        | Single               |
+-----------------------+----------------------+
| Orthodox Affiliation | 1009                 |
+-----------------------+----------------------+
| Race                  | Unknown              |
+-----------------------+----------------------+
| Ethnic Group          | Unknown              |
+-----------------------+----------------------+
 
 
 Author
 
 
+--------------+--------------------------------------------+
| Author       | [facility]  |
|              | and [place]                                |
+--------------+--------------------------------------------+
| Organization | [facility]  |
|              | and [place]                                |
+--------------+--------------------------------------------+
| Address      | Unknown                                    |
+--------------+--------------------------------------------+
| Phone        | Unavailable                                |
+--------------+--------------------------------------------+
 
 
 
 Support
 
 
+----------------+--------------+---------------------+-----------------+
| Name           | Relationship | Address             | Phone           |
+----------------+--------------+---------------------+-----------------+
| Ada/Ed Radhames | ECON         | [address]              | [phone] |
|                |              | [place]  |                 |
|                |              |  [address]              |                 |
+----------------+--------------+---------------------+-----------------+
 
 
 
 
 Care Team Providers
 
 
+-----------------------+------+-------------+
| Care Team Member Name | Role | Phone       |
+-----------------------+------+-------------+
 PCP  | Unavailable |
+-----------------------+------+-------------+
 
 
 
 Reason for Visit
 
 
+-------------------+----------+
| Reason            | Comments |
+-------------------+----------+
| Medication Refill |          |
+-------------------+----------+
 
 
 
 Encounter Details
 
 
+--------+--------+---------------------+----------------------+-------------------+
| Date   | Type   | Department          | Care Team            | Description       |
+--------+--------+---------------------+----------------------+-------------------+
| / | Refill |   [facility]         |   Marzena Moser  | Medication Refill |
|    |        | NEPHROLOGY  [address]   | M, DO  [address]      |                   |
|        |        | [address]   | [address]      |                   |
|        |        | [place], [place]     | [place], [place]      |                   |
|        |        | [address]          | [address]  [phone]  |                   |
|        |        | [phone]        |  [phone] (Fax)  |                   |
+--------+--------+---------------------+----------------------+-------------------+
 
 
 
 Social History
 
 
+--------------+-------+-----------+--------+------+
| Tobacco Use  | Types | Packs/Day | Years  | Date |
|              |       |           | Used   |      |
+--------------+-------+-----------+--------+------+
| Never Smoker |       |           |        |      |
+--------------+-------+-----------+--------+------+
 
 
 
+---------------------+---+---+---+
| Smokeless Tobacco:  |   |   |   |
| Never Used          |   |   |   |
+---------------------+---+---+---+
 
 
 
+---------------------------------------------------------------+
| Comments: some second hand smoke exposure, but fairly minimal |
 
+---------------------------------------------------------------+
 
 
 
+-------------+-------------+---------+----------+
| Alcohol Use | Drinks/Week | oz/Week | Comments |
+-------------+-------------+---------+----------+
| No          |             |         |          |
+-------------+-------------+---------+----------+
 
 
 
+------------------+---------------+
| Sex Assigned at  | Date Recorded |
| Birth            |               |
+------------------+---------------+
| Not on file      |               |
+------------------+---------------+
 
 
 
+----------------+-------------+-------------+
| Job Start Date | Occupation  | Industry    |
+----------------+-------------+-------------+
| Not on file    | Not on file | Not on file |
+----------------+-------------+-------------+
 
 
 
+----------------+--------------+------------+
| Travel History | Travel Start | Travel End |
+----------------+--------------+------------+
 
 
 
+-------------------------------------+
| No recent travel history available. |
+-------------------------------------+
 documented as of this encounter
 
 Plan of Treatment
 
 
+--------+-----------+------------+----------------------+-------------------+
| Date   | Type      | Specialty  | Care Team            | Description       |
+--------+-----------+------------+----------------------+-------------------+
| / | Office    | Cardiology |   HellalfredoTonia,    |                   |
|    | Visit     |            | ARNP  [address]   |                   |
|        |           |            | St  [place], [place]  |                   |
|        |           |            | [address]  [phone]  |                   |
|        |           |            |  [phone] (Fax)  |                   |
+--------+-----------+------------+----------------------+-------------------+
| / | Hospital  | Radiology  |   Popeye Townsend, |                   |
|    | Encounter |            |  MD  [address] |                   |
|        |           |            |  St.  [place],   |                   |
|        |           |            | [place] [address]             |                   |
|        |           |            | [phone]         |                   |
|        |           |            | [phone] (Fax)   |                   |
+--------+-----------+------------+----------------------+-------------------+
| / | Surgery   | Radiology  |   Popeye Townsend, | CV EP PPM SYSTEM  |
 
|    |           |            |  MD  [address] | IMPLANT           |
|        |           |            |  St.  [place],   |                   |
|        |           |            | [place] [address]             |                   |
|        |           |            | [phone]         |                   |
|        |           |            | [phone] (Fax)   |                   |
+--------+-----------+------------+----------------------+-------------------+
| 02/10/ | Clinical  | Cardiology |                      |                   |
|    | Support   |            |                      |                   |
+--------+-----------+------------+----------------------+-------------------+
| / | Office    | Cardiology |   Tonia Wilson,    |                   |
|    | Visit     |            | ARNP  [address]   |                   |
|        |           |            | [place]  |                   |
|        |           |            | [address]  [phone]  |                   |
|        |           |            |  [phone] (Fax)  |                   |
+--------+-----------+------------+----------------------+-------------------+
| / | Off-Site  | Nephrology |   Marzena Moser  |                   |
|    | Visit     |            | DO ETIENNE  [address] [place] [address]      |                   |
|        |           |            | [address]      |                   |
|        |           |            | [place]      |                   |
|        |           |            | [address]  [phone]  |                   |
|        |           |            |  [phone] (Fax)  |                   |
+--------+-----------+------------+----------------------+-------------------+
 documented as of this encounter
 
 Visit Diagnoses
 Not on filedocumented in this encounter

## 2020-01-08 NOTE — XMS
Encounter Summary
  Created on: 2020
 
 Viviana Ricci
 External Reference #: [id number]
 : 47
 Sex: Female
 
 Demographics
 
 
+-----------------------+----------------------+
| Address               | [address]      |
|                       | [place]  [address] |
+-----------------------+----------------------+
| Home Phone            | [phone]      |
+-----------------------+----------------------+
| Preferred Language    | Unknown              |
+-----------------------+----------------------+
| Marital Status        | Single               |
+-----------------------+----------------------+
| Protestant Affiliation | 1009                 |
+-----------------------+----------------------+
| Race                  | Unknown              |
+-----------------------+----------------------+
| Ethnic Group          | Unknown              |
+-----------------------+----------------------+
 
 
 Author
 
 
+--------------+--------------------------------------------+
| Author       |  and [facility]  |
|              | and [facility]                                |
+--------------+--------------------------------------------+
| Organization |  and [facility]  |
|              | and [facility]                                |
+--------------+--------------------------------------------+
| Address      | Unknown                                    |
+--------------+--------------------------------------------+
| Phone        | Unavailable                                |
+--------------+--------------------------------------------+
 
 
 
 Support
 
 
+----------------+--------------+---------------------+-----------------+
| Name           | Relationship | Address             | Phone           |
+----------------+--------------+---------------------+-----------------+
| Ada/Ed Radhames | ECON         | [address]              | [phone] |
|                |              | [place]  |                 |
|                |              |  [id number]              |                 |
+----------------+--------------+---------------------+-----------------+
 
 
 
 
 Care Team Providers
 
 
+-----------------------+------+-------------+
| Care Team Member Name | Role | Phone       |
+-----------------------+------+-------------+
 PCP  | Unavailable |
+-----------------------+------+-------------+
 
 
 
 Encounter Details
 
 
+--------+----------+---------------------+----------------------+-------------+
| Date   | Type     | Department          | Care Team            | Description |
+--------+----------+---------------------+----------------------+-------------+
| / | Abstract |   [facility]  [place]         |   Marzena Moser  |             |
|    |          | NEPHROLOGY  [address]   | M, DO  [address]      |             |
|        |          | [address]   | [address]      |             |
|        |          | [place], [place]     | [place]      |             |
|        |          | [address]          | [address]  [phone]  |             |
|        |          | [phone]        |  [phone] (Fax)  |             |
+--------+----------+---------------------+----------------------+-------------+
 
 
 
 Social History
 
 
+--------------+-------+-----------+--------+------+
| Tobacco Use  | Types | Packs/Day | Years  | Date |
|              |       |           | Used   |      |
+--------------+-------+-----------+--------+------+
| Never Smoker |       |           |        |      |
+--------------+-------+-----------+--------+------+
 
 
 
+---------------------+---+---+---+
| Smokeless Tobacco:  |   |   |   |
| Never Used          |   |   |   |
+---------------------+---+---+---+
 
 
 
+---------------------------------------------------------------+
| Comments: some second hand smoke exposure, but fairly minimal |
+---------------------------------------------------------------+
 
 
 
+-------------+-------------+---------+----------+
| Alcohol Use | Drinks/Week | oz/Week | Comments |
+-------------+-------------+---------+----------+
| No          |             |         |          |
+-------------+-------------+---------+----------+
 
 
 
 
+------------------+---------------+
| Sex Assigned at  | Date Recorded |
| Birth            |               |
+------------------+---------------+
| Not on file      |               |
+------------------+---------------+
 
 
 
+----------------+-------------+-------------+
| Job Start Date | Occupation  | Industry    |
+----------------+-------------+-------------+
| Not on file    | Not on file | Not on file |
+----------------+-------------+-------------+
 
 
 
+----------------+--------------+------------+
| Travel History | Travel Start | Travel End |
+----------------+--------------+------------+
 
 
 
+-------------------------------------+
| No recent travel history available. |
+-------------------------------------+
 documented as of this encounter
 
 Plan of Treatment
 
 
+--------+-----------+------------+----------------------+-------------------+
| Date   | Type      | Specialty  | Care Team            | Description       |
+--------+-----------+------------+----------------------+-------------------+
| / | Office    | Cardiology |   Tonia Wilson,    |                   |
|    | Visit     |            | ARNJESSICA  [address]   |                   |
|        |           |            | [place]  |                   |
|        |           |            | [address]  [phone]  |                   |
|        |           |            |  [phone] (Fax)  |                   |
+--------+-----------+------------+----------------------+-------------------+
| / | Hospital  | Radiology  |   Popeye Townsend, |                   |
|    | Encounter |            |  MD  [address] |                   |
|        |           |            |  St.  [place],   |                   |
|        |           |            | [place] [address]             |                   |
|        |           |            | [phone]         |                   |
|        |           |            | [phone] (Fax)   |                   |
+--------+-----------+------------+----------------------+-------------------+
| / | Surgery   | Radiology  |   Popeye Townsend, | CV EP PPM SYSTEM  |
|    |           |            |  MD  [address] | IMPLANT           |
|        |           |            |  St.  [place],   |                   |
|        |           |            | [place] [address]             |                   |
|        |           |            | [phone]         |                   |
|        |           |            | [phone] (Fax)   |                   |
+--------+-----------+------------+----------------------+-------------------+
| 02/10/ | Clinical  | Cardiology |                      |                   |
|    | Support   |            |                      |                   |
+--------+-----------+------------+----------------------+-------------------+
| / | Office    | Cardiology |   Tonia Wilson,    |                   |
|    | Visit     |            | ARNP  [address]   |                   |
 
|        |           |            | St  [place], [place]  |                   |
|        |           |            | [address]  [phone]  |                   |
|        |           |            |  [phone] (Fax)  |                   |
+--------+-----------+------------+----------------------+-------------------+
| / | Off-Site  | Nephrology |   Marzena Moser  |                   |
| 2020   | Visit     |            | M,   [address] [place] [address]      |                   |
|        |           |            | [address]      |                   |
|        |           |            | [place]      |                   |
|        |           |            | [address]  [phone]  |                   |
|        |           |            |  [phone] (Fax)  |                   |
+--------+-----------+------------+----------------------+-------------------+
 documented as of this encounter
 
 Procedures
 
 
+----------------------+--------+------------+----------------------+----------------------+
| Procedure Name       | Priori | Date/Time  | Associated Diagnosis | Comments             |
|                      | ty     |            |                      |                      |
+----------------------+--------+------------+----------------------+----------------------+
| CMP14+LP+CBC/D/PLT+T | Routin | 2013 |                      |   Results for this   |
| SH+UA/M (NON ORD)    | e      |            |                      | procedure are in the |
|                      |        |            |                      |  results section.    |
+----------------------+--------+------------+----------------------+----------------------+
 documented in this encounter
 
 Results
 CMP14+LP+CBC/D/Plt+TSH+UA/M (2013)
 
+-------------+-----------+-----------------+------------+--------------+
| Component   | Value     | Ref Range       | Performed  | Pathologist  |
|             |           |                 | At         | Signature    |
+-------------+-----------+-----------------+------------+--------------+
| Na          | 140       | mmol/L          |            |              |
+-------------+-----------+-----------------+------------+--------------+
| K           | 5.0       | mmol/L          |            |              |
+-------------+-----------+-----------------+------------+--------------+
| Cl          | 111       | mmol/L          |            |              |
+-------------+-----------+-----------------+------------+--------------+
| CO2         | 17        | mmol/L          |            |              |
+-------------+-----------+-----------------+------------+--------------+
| BUN         | 69        | mg/dL           |            |              |
+-------------+-----------+-----------------+------------+--------------+
| CREA        | 1.9       | mg/dL           |            |              |
+-------------+-----------+-----------------+------------+--------------+
| Glucose     | 137       | mg/dL           |            |              |
+-------------+-----------+-----------------+------------+--------------+
| Calcium     | 9.1       | mg/dL           |            |              |
+-------------+-----------+-----------------+------------+--------------+
| Hemoglobin  | 8.2       | %               |            |              |
| A1c         |           |                 |            |              |
+-------------+-----------+-----------------+------------+--------------+
| Phosphorus  | 4.5 (A)   | 2.6 - 4.4 mg/dL |            |              |
+-------------+-----------+-----------------+------------+--------------+
| Magnesium   | 2.0       | mg/dL           |            |              |
+-------------+-----------+-----------------+------------+--------------+
| Cholesterol | 162       | mg/dL           |            |              |
+-------------+-----------+-----------------+------------+--------------+
| HDL         | 45        | mg/dL           |            |              |
+-------------+-----------+-----------------+------------+--------------+
 
| LDL         | 72        |                 |            |              |
| Cholesterol |           |                 |            |              |
+-------------+-----------+-----------------+------------+--------------+
| Triglycerid | 225       |                 |            |              |
| es          |           |                 |            |              |
+-------------+-----------+-----------------+------------+--------------+
| WBC         | 4.2       | K/uL            |            |              |
+-------------+-----------+-----------------+------------+--------------+
| Hemoglobin  | 12.5      | 11.3 - 15.5     |            |              |
|             |           | g/dL            |            |              |
+-------------+-----------+-----------------+------------+--------------+
| Hematocrit  | 37.0      | 34.0 - 46.0 %   |            |              |
+-------------+-----------+-----------------+------------+--------------+
| Platelet    | 170       | 150 - 400 K/uL  |            |              |
| Count       |           |                 |            |              |
+-------------+-----------+-----------------+------------+--------------+
| MCV         | 101.1 (A) | 80.0 - 98.0 fL  |            |              |
+-------------+-----------+-----------------+------------+--------------+
| Bilirubin   | 0.5       | 0.1 - 1.5 mg/dL |            |              |
| Total       |           |                 |            |              |
+-------------+-----------+-----------------+------------+--------------+
| AST         | 19        | 10 - 45 U/L     |            |              |
+-------------+-----------+-----------------+------------+--------------+
| ALT         | 12        | U/L             |            |              |
+-------------+-----------+-----------------+------------+--------------+
| Alkaline    | 85        | 35 - 115 U/L    |            |              |
| Phosphatase |           |                 |            |              |
+-------------+-----------+-----------------+------------+--------------+
| Albumin     | 3.7       | 3.3 - 4.8 g/dL  |            |              |
+-------------+-----------+-----------------+------------+--------------+
| PTH INTACT  | 350.8     | pg/mL           |            |              |
+-------------+-----------+-----------------+------------+--------------+
| Estimated   | 27.0      | mL/min/1.73m2   |            |              |
| GFR         |           |                 |            |              |
+-------------+-----------+-----------------+------------+--------------+
| Tacrolimus  | 5.7       |                 |            |              |
| Level       |           |                 |            |              |
+-------------+-----------+-----------------+------------+--------------+
 
 
 
+----------+
| Specimen |
+----------+
|          |
+----------+
 documented in this encounter
 
 Visit Diagnoses
 Not on filedocumented in this encounter

## 2020-01-08 NOTE — XMS
Encounter Summary
  Created on: 2020
 
 Viviana Ricci
 External Reference #: [id number]
 : 47
 Sex: Female
 
 Demographics
 
 
+-----------------------+----------------------+
| Address               | [address]      |
|                       | [place]  [address] |
+-----------------------+----------------------+
| Home Phone            | [phone]      |
+-----------------------+----------------------+
| Preferred Language    | Unknown              |
+-----------------------+----------------------+
| Marital Status        | Single               |
+-----------------------+----------------------+
| Holiness Affiliation | [id number]                 |
+-----------------------+----------------------+
| Race                  | Unknown              |
+-----------------------+----------------------+
| Ethnic Group          | Unknown              |
+-----------------------+----------------------+
 
 
 Author
 
 
+--------------+--------------------------------------------+
| Author       | [facility]  |
|              | and [facility]                                |
+--------------+--------------------------------------------+
| Organization | [facility]  |
|              | and [facility]                                |
+--------------+--------------------------------------------+
| Address      | Unknown                                    |
+--------------+--------------------------------------------+
| Phone        | Unavailable                                |
+--------------+--------------------------------------------+
 
 
 
 Support
 
 
+----------------+--------------+---------------------+-----------------+
| Name           | Relationship | Address             | Phone           |
+----------------+--------------+---------------------+-----------------+
| Ada/Ed Radhames | ECON         | [address]              | [phone] |
|                |              | [place]  |                 |
|                |              |  [address]              |                 |
+----------------+--------------+---------------------+-----------------+
 
 
 
 
 Care Team Providers
 
 
+-----------------------+------+-------------+
| Care Team Member Name | Role | Phone       |
+-----------------------+------+-------------+
 PCP  | Unavailable |
+-----------------------+------+-------------+
 
 
 
 Encounter Details
 
 
+--------+----------+---------------------+----------------------+-------------+
| Date   | Type     | Department          | Care Team            | Description |
+--------+----------+---------------------+----------------------+-------------+
| / | Abstract |   [facility]  [place]         |   Marzena Moser  |             |
|    |          | NEPHROLOGY  [address]   | M, DO  [address]      |             |
|        |          | [address]   | [address]      |             |
|        |          | [place], [place]     | [place]      |             |
|        |          | [address]          | [address]  [phone]  |             |
|        |          | [phone]        |  [phone] (Fax)  |             |
+--------+----------+---------------------+----------------------+-------------+
 
 
 
 Social History
 
 
+--------------+-------+-----------+--------+------+
| Tobacco Use  | Types | Packs/Day | Years  | Date |
|              |       |           | Used   |      |
+--------------+-------+-----------+--------+------+
| Never Smoker |       |           |        |      |
+--------------+-------+-----------+--------+------+
 
 
 
+---------------------+---+---+---+
| Smokeless Tobacco:  |   |   |   |
| Never Used          |   |   |   |
+---------------------+---+---+---+
 
 
 
+---------------------------------------------------------------+
| Comments: some second hand smoke exposure, but fairly minimal |
+---------------------------------------------------------------+
 
 
 
+-------------+-------------+---------+----------+
| Alcohol Use | Drinks/Week | oz/Week | Comments |
+-------------+-------------+---------+----------+
| No          |             |         |          |
+-------------+-------------+---------+----------+
 
 
 
 
+------------------+---------------+
| Sex Assigned at  | Date Recorded |
| Birth            |               |
+------------------+---------------+
| Not on file      |               |
+------------------+---------------+
 
 
 
+----------------+-------------+-------------+
| Job Start Date | Occupation  | Industry    |
+----------------+-------------+-------------+
| Not on file    | Not on file | Not on file |
+----------------+-------------+-------------+
 
 
 
+----------------+--------------+------------+
| Travel History | Travel Start | Travel End |
+----------------+--------------+------------+
 
 
 
+-------------------------------------+
| No recent travel history available. |
+-------------------------------------+
 documented as of this encounter
 
 Plan of Treatment
 
 
+--------+-----------+------------+----------------------+-------------------+
| Date   | Type      | Specialty  | Care Team            | Description       |
+--------+-----------+------------+----------------------+-------------------+
| / | Office    | Cardiology |   Tonia Wilson,    |                   |
|    | Visit     |            | ARNJESSICA  [address]   |                   |
|        |           |            | [place]  |                   |
|        |           |            | [address]  [phone]  |                   |
|        |           |            |  [phone] (Fax)  |                   |
+--------+-----------+------------+----------------------+-------------------+
| / | Hospital  | Radiology  |   Popeye Townsend, |                   |
|    | Encounter |            |  MD  [address] |                   |
|        |           |            |  St.  [place],   |                   |
|        |           |            | [place] [address]             |                   |
|        |           |            | [phone]         |                   |
|        |           |            | [phone] (Fax)   |                   |
+--------+-----------+------------+----------------------+-------------------+
| / | Surgery   | Radiology  |   Popeye Townsend, | CV EP PPM SYSTEM  |
|    |           |            |  MD  [address] | IMPLANT           |
|        |           |            |  St.  [place],   |                   |
|        |           |            | [place] [address]             |                   |
|        |           |            | [phone]         |                   |
|        |           |            | [phone] (Fax)   |                   |
+--------+-----------+------------+----------------------+-------------------+
| 02/10/ | Clinical  | Cardiology |                      |                   |
|    | Support   |            |                      |                   |
+--------+-----------+------------+----------------------+-------------------+
| / | Office    | Cardiology |   Tonia Wilson,    |                   |
|    | Visit     |            | ARNP  [address]   |                   |
 
|        |           |            | St  [place], [place]  |                   |
|        |           |            | [address]  [phone]  |                   |
|        |           |            |  [phone] (Fax)  |                   |
+--------+-----------+------------+----------------------+-------------------+
| / | Off-Site  | Nephrology |   Marzena Moser  |                   |
|    | Visit     |            | ETIENNE,   [address] [place] [address]      |                   |
|        |           |            | [address]      |                   |
|        |           |            | [place]      |                   |
|        |           |            | [address]  [phone]  |                   |
|        |           |            |  [phone] (Fax)  |                   |
+--------+-----------+------------+----------------------+-------------------+
 documented as of this encounter
 
 Procedures
 
 
+---------------------+--------+------------+----------------------+----------------------+
| Procedure Name      | Priori | Date/Time  | Associated Diagnosis | Comments             |
|                     | ty     |            |                      |                      |
+---------------------+--------+------------+----------------------+----------------------+
| TACROLIMUS ()  | Routin | 2014 |                      |   Results for this   |
| TROUGH              | e      |            |                      | procedure are in the |
|                     |        |            |                      |  results section.    |
+---------------------+--------+------------+----------------------+----------------------+
 documented in this encounter
 
 Results
 Tacrolimus () Level (2014)
 
+-------------+-------+-----------+------------+--------------+
| Component   | Value | Ref Range | Performed  | Pathologist  |
|             |       |           | At         | Signature    |
+-------------+-------+-----------+------------+--------------+
| Tacrolimus  | 5.5   |           | EXTERNAL   |              |
| Level       |       |           | LAB        |              |
+-------------+-------+-----------+------------+--------------+
 
 
 
+-----------------+
| Specimen        |
+-----------------+
| Blood specimen  |
| (specimen)      |
+-----------------+
 
 
 
+----------------+---------+--------------------+--------------+
| Performing     | Address | City/State/Zipcode | Phone Number |
| Organization   |         |                    |              |
+----------------+---------+--------------------+--------------+
|   EXTERNAL LAB |         |                    |              |
+----------------+---------+--------------------+--------------+
 documented in this encounter
 
 Visit Diagnoses
 Not on filedocumented in this encounter

## 2020-01-08 NOTE — XMS
Encounter Summary
  Created on: 2020
 
 Viviana Ricci
 External Reference #: [id number]
 : 47
 Sex: Female
 
 Demographics
 
 
+-----------------------+----------------------+
| Address               | [address]      |
|                       | [place]  [address] |
+-----------------------+----------------------+
| Home Phone            | [phone]      |
+-----------------------+----------------------+
| Preferred Language    | Unknown              |
+-----------------------+----------------------+
| Marital Status        | Single               |
+-----------------------+----------------------+
| Mandaeism Affiliation | 1009                 |
+-----------------------+----------------------+
| Race                  | Unknown              |
+-----------------------+----------------------+
| Ethnic Group          | Unknown              |
+-----------------------+----------------------+
 
 
 Author
 
 
+--------------+--------------------------------------------+
| Author       | [facility]  |
|              | and [facility]                                |
+--------------+--------------------------------------------+
| Organization | [facility]  |
|              | and [facility]                                |
+--------------+--------------------------------------------+
| Address      | Unknown                                    |
+--------------+--------------------------------------------+
| Phone        | Unavailable                                |
+--------------+--------------------------------------------+
 
 
 
 Support
 
 
+----------------+--------------+---------------------+-----------------+
| Name           | Relationship | Address             | Phone           |
+----------------+--------------+---------------------+-----------------+
| Ada/Ed Radhames | ECON         | [address]              | [phone] |
|                |              | [place]  |                 |
|                |              |  [address]              |                 |
+----------------+--------------+---------------------+-----------------+
 
 
 
 
 Care Team Providers
 
 
+-----------------------+------+-------------+
| Care Team Member Name | Role | Phone       |
+-----------------------+------+-------------+
 PCP  | Unavailable |
+-----------------------+------+-------------+
 
 
 
 Encounter Details
 
 
+--------+-----------+----------------------+----------------------+-------------+
| Date   | Type      | Department           | Care Team            | Description |
+--------+-----------+----------------------+----------------------+-------------+
| / | [facility]  |   [facility] |   Edgar Sanders,   |             |
|    | Encounter |  MED CTR XRAY  [address] | MD [address]      |             |
|        |           |  [address]       | [place]      |             |
|        |           | [place] [address] | [address]  [phone]  |             |
|        |           |   [phone]       |  [phone] (Fax)  |             |
+--------+-----------+----------------------+----------------------+-------------+
 
 
 
 Social History
 
 
+----------------+-------+-----------+--------+------+
| Tobacco Use    | Types | Packs/Day | Years  | Date |
|                |       |           | Used   |      |
+----------------+-------+-----------+--------+------+
| Never Assessed |       |           |        |      |
+----------------+-------+-----------+--------+------+
 
 
 
+------------------+---------------+
| Sex Assigned at  | Date Recorded |
| Birth            |               |
+------------------+---------------+
| Not on file      |               |
+------------------+---------------+
 
 
 
+----------------+-------------+-------------+
| Job Start Date | Occupation  | Industry    |
+----------------+-------------+-------------+
| Not on file    | Not on file | Not on file |
+----------------+-------------+-------------+
 
 
 
+----------------+--------------+------------+
| Travel History | Travel Start | Travel End |
+----------------+--------------+------------+
 
 
 
 
+-------------------------------------+
| No recent travel history available. |
+-------------------------------------+
 documented as of this encounter
 
 Plan of Treatment
 
 
+--------+-----------+------------+----------------------+-------------------+
| Date   | Type      | Specialty  | Care Team            | Description       |
+--------+-----------+------------+----------------------+-------------------+
| / | Office    | Cardiology |   Tonia Wilson,    |                   |
|    | Visit     |            | ARNP  [address]   |                   |
|        |           |            | St [place], [place]  |                   |
|        |           |            | [address]  [phone]  |                   |
|        |           |            |  [phone] (Fax)  |                   |
+--------+-----------+------------+----------------------+-------------------+
| / | Hospital  | Radiology  |   Popeye Townsend, |                   |
|    | Encounter |            |  MD  [address] |                   |
|        |           |            |  [place],   |                   |
|        |           |            | [place] [address]             |                   |
|        |           |            | [phone]         |                   |
|        |           |            | [phone] (Fax)   |                   |
+--------+-----------+------------+----------------------+-------------------+
| / | Surgery   | Radiology  |   Popeye Townsend, | CV EP PPM SYSTEM  |
|    |           |            |  MD  [address] | IMPLANT           |
|        |           |            |  [place],   |                   |
|        |           |            | [place] [address]             |                   |
|        |           |            | [phone]         |                   |
|        |           |            | [phone] (Fax)   |                   |
+--------+-----------+------------+----------------------+-------------------+
| 02/10/ | Clinical  | Cardiology |                      |                   |
|    | Support   |            |                      |                   |
+--------+-----------+------------+----------------------+-------------------+
| / | Office    | Cardiology |   Tonia Wilson,    |                   |
|    | Visit     |            | [facility]  [address]   |                   |
|        |           |            | [place]  |                   |
|        |           |            | [address]  [phone]  |                   |
|        |           |            |  [phone] (Fax)  |                   |
+--------+-----------+------------+----------------------+-------------------+
| / | Off-Site  | Nephrology |   Marzena Moser  |                   |
|    | Visit     |            | DO ETIENNE  [address] [place] [address]      |                   |
|        |           |            | [address]      |                   |
|        |           |            | [place]      |                   |
|        |           |            | [address] [phone]  |                   |
|        |           |            |  [phone] (Fax)  |                   |
+--------+-----------+------------+----------------------+-------------------+
 documented as of this encounter
 
 Visit Diagnoses
 Not on filedocumented in this encounter

## 2020-01-08 NOTE — XMS
Encounter Summary
  Created on: 2020
 
 Viviana Ricci
 External Reference #: [id number]
 : 47
 Sex: Female
 
 Demographics
 
 
+-----------------------+----------------------+
| Address               | [address]      |
|                       | [place]  [address] |
+-----------------------+----------------------+
| Home Phone            | [phone]      |
+-----------------------+----------------------+
| Preferred Language    | Unknown              |
+-----------------------+----------------------+
| Marital Status        | Single               |
+-----------------------+----------------------+
| Episcopal Affiliation | [id number]                 |
+-----------------------+----------------------+
| Race                  | Unknown              |
+-----------------------+----------------------+
| Ethnic Group          | Unknown              |
+-----------------------+----------------------+
 
 
 Author
 
 
+--------------+--------------------------------------------+
| Author       | [facility] [place]  |
|              | and [place]                                |
+--------------+--------------------------------------------+
| Organization | [facility] [place]  |
|              | and [place]                                |
+--------------+--------------------------------------------+
| Address      | Unknown                                    |
+--------------+--------------------------------------------+
| Phone        | Unavailable                                |
+--------------+--------------------------------------------+
 
 
 
 Support
 
 
+----------------+--------------+---------------------+-----------------+
| Name           | Relationship | Address             | Phone           |
+----------------+--------------+---------------------+-----------------+
| Ada/Ed Radhames | ECON         | [address]              | [phone] |
|                |              | [place]  |                 |
|                |              |  [address]              |                 |
+----------------+--------------+---------------------+-----------------+
 
 
 
 
 Care Team Providers
 
 
+-----------------------+------+-------------+
| Care Team Member Name | Role | Phone       |
+-----------------------+------+-------------+
 PCP  | Unavailable |
+-----------------------+------+-------------+
 
 
 
 Reason for Visit
 
 
+--------+-----------------------------+
| Reason | Comments                    |
+--------+-----------------------------+
| Other  | Patient refused O2 delivery |
+--------+-----------------------------+
 
 
 
 Encounter Details
 
 
+--------+-----------+----------------------+----------------+----------------------+
| Date   | Type      | Department           | Care Team      | Description          |
+--------+-----------+----------------------+----------------+----------------------+
| / | Telephone |   [facility] [place]          |   Yadielenstein,  | Other (Patient       |
|    |           | PULMONARY  [address]     | Nena GUSMAN MD  | refused O2 delivery) |
|        |           | [place]  [place], |                |                      |
|        |           |  [place] [address]       |                |                      |
|        |           | [phone]         |                |                      |
+--------+-----------+----------------------+----------------+----------------------+
 
 
 
 Social History
 
 
+--------------+-------+-----------+--------+------+
| Tobacco Use  | Types | Packs/Day | Years  | Date |
|              |       |           | Used   |      |
+--------------+-------+-----------+--------+------+
| Never Smoker |       |           |        |      |
+--------------+-------+-----------+--------+------+
 
 
 
+---------------------+---+---+---+
| Smokeless Tobacco:  |   |   |   |
| Never Used          |   |   |   |
+---------------------+---+---+---+
 
 
 
+---------------------------------------------------------------+
| Comments: some second hand smoke exposure, but fairly minimal |
+---------------------------------------------------------------+
 
 
 
 
+-------------+-------------+---------+----------+
| Alcohol Use | Drinks/Week | oz/Week | Comments |
+-------------+-------------+---------+----------+
| No          |             |         |          |
+-------------+-------------+---------+----------+
 
 
 
+------------------+---------------+
| Sex Assigned at  | Date Recorded |
| Birth            |               |
+------------------+---------------+
| Not on file      |               |
+------------------+---------------+
 
 
 
+----------------+-------------+-------------+
| Job Start Date | Occupation  | Industry    |
+----------------+-------------+-------------+
| Not on file    | Not on file | Not on file |
+----------------+-------------+-------------+
 
 
 
+----------------+--------------+------------+
| Travel History | Travel Start | Travel End |
+----------------+--------------+------------+
 
 
 
+-------------------------------------+
| No recent travel history available. |
+-------------------------------------+
 documented as of this encounter
 
 Plan of Treatment
 
 
+--------+-----------+------------+----------------------+-------------------+
| Date   | Type      | Specialty  | Care Team            | Description       |
+--------+-----------+------------+----------------------+-------------------+
| / | Office    | Cardiology |   Tonia Wilson,    |                   |
| 2020   | Visit     |            | ARNJESSICA  [address]   |                   |
|        |           |            | St [place], [place]  |                   |
|        |           |            | [address]  [phone]  |                   |
|        |           |            |  [phone] (Fax)  |                   |
+--------+-----------+------------+----------------------+-------------------+
| / | Hospital  | Radiology  |   Popeye Townsend, |                   |
|    | Encounter |            |  MD [address] |                   |
|        |           |            |  [place],   |                   |
|        |           |            | [place] [address]             |                   |
|        |           |            | [phone]         |                   |
|        |           |            | [phone] (Fax)   |                   |
+--------+-----------+------------+----------------------+-------------------+
| / | Surgery   | Radiology  |   Popeye Townsend, | CV EP PPM SYSTEM  |
|    |           |            |  MD  [address] | IMPLANT           |
 
|        |           |            |  [place],   |                   |
|        |           |            | [place] [address]             |                   |
|        |           |            | [phone]         |                   |
|        |           |            | [phone] (Fax)   |                   |
+--------+-----------+------------+----------------------+-------------------+
| 02/10/ | Clinical  | Cardiology |                      |                   |
|    | Support   |            |                      |                   |
+--------+-----------+------------+----------------------+-------------------+
| / | Office    | Cardiology |   Tonia Wilson,    |                   |
|    | Visit     |            | BELKIS  [address]   |                   |
|        |           |            | [place]  |                   |
|        |           |            | [address]  [phone]  |                   |
|        |           |            |  [phone] (Fax)  |                   |
+--------+-----------+------------+----------------------+-------------------+
| / | Off-Site  | Nephrology |   Marzena Moser  |                   |
|    | Visit     |            | M, DO  [address]      |                   |
|        |           |            | [address]      |                   |
|        |           |            | [place]      |                   |
|        |           |            | [address] [phone]  |                   |
|        |           |            |  [phone] (Fax)  |                   |
+--------+-----------+------------+----------------------+-------------------+
 documented as of this encounter
 
 Visit Diagnoses
 Not on filedocumented in this encounter

## 2020-01-08 NOTE — XMS
Encounter Summary
  Created on: 2020
 
 Viviana Ricci
 External Reference #: [id number]
 : 47
 Sex: Female
 
 Demographics
 
 
+-----------------------+----------------------+
| Address               | [address]      |
|                       | [place]  [address] |
+-----------------------+----------------------+
| Home Phone            | [phone]      |
+-----------------------+----------------------+
| Preferred Language    | Unknown              |
+-----------------------+----------------------+
| Marital Status        | Single               |
+-----------------------+----------------------+
| Yarsanism Affiliation | 1009                 |
+-----------------------+----------------------+
| Race                  | Unknown              |
+-----------------------+----------------------+
| Ethnic Group          | Unknown              |
+-----------------------+----------------------+
 
 
 Author
 
 
+--------------+--------------------------------------------+
| Author       | [facility] [place]  |
|              | and [place]                                |
+--------------+--------------------------------------------+
| Organization | [facility] [place]  |
|              | and [place]                                |
+--------------+--------------------------------------------+
| Address      | Unknown                                    |
+--------------+--------------------------------------------+
| Phone        | Unavailable                                |
+--------------+--------------------------------------------+
 
 
 
 Support
 
 
+----------------+--------------+---------------------+-----------------+
| Name           | Relationship | Address             | Phone           |
+----------------+--------------+---------------------+-----------------+
| Ada/Ed Radhames | ECON         | [address]              | [phone] |
|                |              | [place]  |                 |
|                |              |  [address]              |                 |
+----------------+--------------+---------------------+-----------------+
 
 
 
 
 Care Team Providers
 
 
+-----------------------+------+-------------+
| Care Team Member Name | Role | Phone       |
+-----------------------+------+-------------+
 PCP  | Unavailable |
+-----------------------+------+-------------+
 
 
 
 Reason for Visit
 
 
+-------------------+----------+
| Reason            | Comments |
+-------------------+----------+
| Medication Refill |          |
+-------------------+----------+
 
 
 
 Encounter Details
 
 
+--------+--------+---------------------+----------------------+-------------------+
| Date   | Type   | Department          | Care Team            | Description       |
+--------+--------+---------------------+----------------------+-------------------+
| / | Refill |   [place]         |   Marzena Moser  | Medication Refill |
| 2017   |        | NEPHROLOGY  [address]   | M, DO  [address]      |                   |
|        |        | [address]   | [address]      |                   |
|        |        | [place], [place]     | [place], [place]      |                   |
|        |        | [address]          | [address]  [phone]  |                   |
|        |        | [phone]        |  [phone] (Fax)  |                   |
+--------+--------+---------------------+----------------------+-------------------+
 
 
 
 Social History
 
 
+--------------+-------+-----------+--------+------+
| Tobacco Use  | Types | Packs/Day | Years  | Date |
|              |       |           | Used   |      |
+--------------+-------+-----------+--------+------+
| Never Smoker |       |           |        |      |
+--------------+-------+-----------+--------+------+
 
 
 
+---------------------+---+---+---+
| Smokeless Tobacco:  |   |   |   |
| Never Used          |   |   |   |
+---------------------+---+---+---+
 
 
 
+---------------------------------------------------------------+
| Comments: some second hand smoke exposure, but fairly minimal |
 
+---------------------------------------------------------------+
 
 
 
+-------------+-------------+---------+----------+
| Alcohol Use | Drinks/Week | oz/Week | Comments |
+-------------+-------------+---------+----------+
| No          |             |         |          |
+-------------+-------------+---------+----------+
 
 
 
+------------------+---------------+
| Sex Assigned at  | Date Recorded |
| Birth            |               |
+------------------+---------------+
| Not on file      |               |
+------------------+---------------+
 
 
 
+----------------+-------------+-------------+
| Job Start Date | Occupation  | Industry    |
+----------------+-------------+-------------+
| Not on file    | Not on file | Not on file |
+----------------+-------------+-------------+
 
 
 
+----------------+--------------+------------+
| Travel History | Travel Start | Travel End |
+----------------+--------------+------------+
 
 
 
+-------------------------------------+
| No recent travel history available. |
+-------------------------------------+
 documented as of this encounter
 
 Plan of Treatment
 
 
+--------+-----------+------------+----------------------+-------------------+
| Date   | Type      | Specialty  | Care Team            | Description       |
+--------+-----------+------------+----------------------+-------------------+
| / | Office    | Cardiology |   HellalfredoTonia,    |                   |
|    | Visit     |            | ARNP  [address]   |                   |
|        |           |            | St  [place], [place]  |                   |
|        |           |            | [address]  [phone]  |                   |
|        |           |            |  [phone] (Fax)  |                   |
+--------+-----------+------------+----------------------+-------------------+
| / | Hospital  | Radiology  |   Popeye Townsend, |                   |
|    | Encounter |            |  MD  [address] |                   |
|        |           |            |  St.  [place],   |                   |
|        |           |            | [place] [address]             |                   |
|        |           |            | [phone]         |                   |
|        |           |            | [phone] (Fax)   |                   |
+--------+-----------+------------+----------------------+-------------------+
| / | Surgery   | Radiology  |   Popeye Townsend, | CV EP PPM SYSTEM  |
 
|    |           |            |  MD  [address] | IMPLANT           |
|        |           |            |  St.  [place],   |                   |
|        |           |            | [place] [address]             |                   |
|        |           |            | [phone]         |                   |
|        |           |            | [phone] (Fax)   |                   |
+--------+-----------+------------+----------------------+-------------------+
| 02/10/ | Clinical  | Cardiology |                      |                   |
|    | Support   |            |                      |                   |
+--------+-----------+------------+----------------------+-------------------+
| / | Office    | Cardiology |   Tonia Wilson,    |                   |
|    | Visit     |            | ARNP  [address]   |                   |
|        |           |            | [place]  |                   |
|        |           |            | [address]  [phone]  |                   |
|        |           |            |  [phone] (Fax)  |                   |
+--------+-----------+------------+----------------------+-------------------+
| / | Off-Site  | Nephrology |   Marzena Moser  |                   |
|    | Visit     |            | DO ETIENNE  [address] [place] [address]      |                   |
|        |           |            | [address]      |                   |
|        |           |            | [place]      |                   |
|        |           |            | [address]  [phone]  |                   |
|        |           |            |  [phone] (Fax)  |                   |
+--------+-----------+------------+----------------------+-------------------+
 documented as of this encounter
 
 Visit Diagnoses
 
 
+---------------------------------+
| Diagnosis                       |
+---------------------------------+
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
+---------------------------------+
 documented in this encounter

## 2020-01-08 NOTE — XMS
Encounter Summary
  Created on: 2020
 
 Viviana Ricci
 External Reference #: [id number]
 : 47
 Sex: Female
 
 Demographics
 
 
+-----------------------+----------------------+
| Address               | [address]      |
|                       | [place]  [address] |
+-----------------------+----------------------+
| Home Phone            | [phone]      |
+-----------------------+----------------------+
| Preferred Language    | Unknown              |
+-----------------------+----------------------+
| Marital Status        | Single               |
+-----------------------+----------------------+
| Cheondoism Affiliation | [id number]                 |
+-----------------------+----------------------+
| Race                  | Unknown              |
+-----------------------+----------------------+
| Ethnic Group          | Unknown              |
+-----------------------+----------------------+
 
 
 Author
 
 
+--------------+--------------------------------------------+
| Author       | [facility]  |
|              | and [place]                                |
+--------------+--------------------------------------------+
| Organization | [facility]  |
|              | and [place]                                |
+--------------+--------------------------------------------+
| Address      | Unknown                                    |
+--------------+--------------------------------------------+
| Phone        | Unavailable                                |
+--------------+--------------------------------------------+
 
 
 
 Support
 
 
+----------------+--------------+---------------------+-----------------+
| Name           | Relationship | Address             | Phone           |
+----------------+--------------+---------------------+-----------------+
| Ada/Ed Radhames | ECON         | [address]              | [phone] |
|                |              | [place]  |                 |
|                |              |  [address]              |                 |
+----------------+--------------+---------------------+-----------------+
 
 
 
 
 Care Team Providers
 
 
+-----------------------+------+-------------+
| Care Team Member Name | Role | Phone       |
+-----------------------+------+-------------+
 PCP  | Unavailable |
+-----------------------+------+-------------+
 
 
 
 Encounter Details
 
 
+--------+-----------+----------------------+----------------------+-------------+
| Date   | Type      | Department           | Care Team            | Description |
+--------+-----------+----------------------+----------------------+-------------+
| / | Hospital  |   [facility] |   Marzena Moser  |             |
| 2000   | Encounter |  [facility]  | M, DO  [address]      |             |
|        |           |  [address] | [address]      |             |
|        |           |  [place]           | [place]      |             |
|        |           | [address]           | [address]  [phone]  |             |
|        |           | [phone]         |  [phone] (Fax)  |             |
+--------+-----------+----------------------+----------------------+-------------+
 
 
 
 Social History
 
 
+----------------+-------+-----------+--------+------+
| Tobacco Use    | Types | Packs/Day | Years  | Date |
|                |       |           | Used   |      |
+----------------+-------+-----------+--------+------+
| Never Assessed |       |           |        |      |
+----------------+-------+-----------+--------+------+
 
 
 
+------------------+---------------+
| Sex Assigned at  | Date Recorded |
| Birth            |               |
+------------------+---------------+
| Not on file      |               |
+------------------+---------------+
 
 
 
+----------------+-------------+-------------+
| Job Start Date | Occupation  | Industry    |
+----------------+-------------+-------------+
| Not on file    | Not on file | Not on file |
+----------------+-------------+-------------+
 
 
 
+----------------+--------------+------------+
| Travel History | Travel Start | Travel End |
+----------------+--------------+------------+
 
 
 
 
+-------------------------------------+
| No recent travel history available. |
+-------------------------------------+
 documented as of this encounter
 
 Plan of Treatment
 
 
+--------+-----------+------------+----------------------+-------------------+
| Date   | Type      | Specialty  | Care Team            | Description       |
+--------+-----------+------------+----------------------+-------------------+
| / | Office    | Cardiology |   Tonia Wilson,    |                   |
| 2020   | Visit     |            | ARNJESSICA  [address]   |                   |
|        |           |            | St [place], [place]  |                   |
|        |           |            | [address]  [phone]  |                   |
|        |           |            |  [phone] (Fax)  |                   |
+--------+-----------+------------+----------------------+-------------------+
| / | Hospital  | Radiology  |   Popeye Townsend, |                   |
|    | Encounter |            |  MD [address] |                   |
|        |           |            |  [place],   |                   |
|        |           |            | [place] [address]             |                   |
|        |           |            | [phone]         |                   |
|        |           |            | [phone] (Fax)   |                   |
+--------+-----------+------------+----------------------+-------------------+
| / | Surgery   | Radiology  |   Popeye Townsend, | CV EP PPM SYSTEM  |
|    |           |            |  MD  [address] | IMPLANT           |
|        |           |            |  [place],   |                   |
|        |           |            | [place] [address]             |                   |
|        |           |            | [phone]         |                   |
|        |           |            | [phone] (Fax)   |                   |
+--------+-----------+------------+----------------------+-------------------+
| 02/10/ | Clinical  | Cardiology |                      |                   |
|    | Support   |            |                      |                   |
+--------+-----------+------------+----------------------+-------------------+
| / | Office    | Cardiology |   Tonia Wilson,    |                   |
|    | Visit     |            | BELKIS  [address]   |                   |
|        |           |            | [place]  |                   |
|        |           |            | [address]  [phone]  |                   |
|        |           |            |  [phone] (Fax)  |                   |
+--------+-----------+------------+----------------------+-------------------+
| / | Off-Site  | Nephrology |   Marzena Moser  |                   |
|    | Visit     |            | M, DO  [address]      |                   |
|        |           |            | [address]      |                   |
|        |           |            | [place]      |                   |
|        |           |            | [address] [phone]  |                   |
|        |           |            |  [phone] (Fax)  |                   |
+--------+-----------+------------+----------------------+-------------------+
 documented as of this encounter
 
 Visit Diagnoses
 Not on filedocumented in this encounter

## 2020-01-08 NOTE — XMS
Encounter Summary
  Created on: 2020
 
 Viviana Ricci
 External Reference #: [id number]
 : 47
 Sex: Female
 
 Demographics
 
 
+-----------------------+----------------------+
| Address               | [address]      |
|                       | [place]  [address] |
+-----------------------+----------------------+
| Home Phone            | [phone]      |
+-----------------------+----------------------+
| Preferred Language    | Unknown              |
+-----------------------+----------------------+
| Marital Status        | Single               |
+-----------------------+----------------------+
| Hinduism Affiliation | 1009                 |
+-----------------------+----------------------+
| Race                  | Unknown              |
+-----------------------+----------------------+
| Ethnic Group          | Unknown              |
+-----------------------+----------------------+
 
 
 Author
 
 
+--------------+--------------------------------------------+
| Author       | [facility]  |
|              | and [place]                                |
+--------------+--------------------------------------------+
| Organization | [facility] [place]  |
|              | and [place]                                |
+--------------+--------------------------------------------+
| Address      | Unknown                                    |
+--------------+--------------------------------------------+
| Phone        | Unavailable                                |
+--------------+--------------------------------------------+
 
 
 
 Support
 
 
+----------------+--------------+---------------------+-----------------+
| Name           | Relationship | Address             | Phone           |
+----------------+--------------+---------------------+-----------------+
| Ada/Ed Radhames | ECON         | [address]              | [phone] |
|                |              | [place]  |                 |
|                |              |  [address]              |                 |
+----------------+--------------+---------------------+-----------------+
 
 
 
 
 Care Team Providers
 
 
+-----------------------+------+-------------+
| Care Team Member Name | Role | Phone       |
+-----------------------+------+-------------+
 PCP  | Unavailable |
+-----------------------+------+-------------+
 
 
 
 Reason for Visit
 
 
+-------------------+----------+
| Reason            | Comments |
+-------------------+----------+
| Medication Refill |          |
+-------------------+----------+
 
 
 
 Encounter Details
 
 
+--------+--------+---------------------+----------------------+-------------------+
| Date   | Type   | Department          | Care Team            | Description       |
+--------+--------+---------------------+----------------------+-------------------+
| / | Refill |   [facility]         |   Marzena Moser  | Medication Refill |
|    |        | NEPHROLOGY  [address]   | M, DO  [address]      |                   |
|        |        | [address]   | [address]      |                   |
|        |        | [place], [place]     | [place], [place]      |                   |
|        |        | [address]          | [address]  [phone]  |                   |
|        |        | [phone]        |  [phone] (Fax)  |                   |
+--------+--------+---------------------+----------------------+-------------------+
 
 
 
 Social History
 
 
+--------------+-------+-----------+--------+------+
| Tobacco Use  | Types | Packs/Day | Years  | Date |
|              |       |           | Used   |      |
+--------------+-------+-----------+--------+------+
| Never Smoker |       |           |        |      |
+--------------+-------+-----------+--------+------+
 
 
 
+---------------------+---+---+---+
| Smokeless Tobacco:  |   |   |   |
| Never Used          |   |   |   |
+---------------------+---+---+---+
 
 
 
+---------------------------------------------------------------+
| Comments: some second hand smoke exposure, but fairly minimal |
 
+---------------------------------------------------------------+
 
 
 
+-------------+-------------+---------+----------+
| Alcohol Use | Drinks/Week | oz/Week | Comments |
+-------------+-------------+---------+----------+
| No          |             |         |          |
+-------------+-------------+---------+----------+
 
 
 
+------------------+---------------+
| Sex Assigned at  | Date Recorded |
| Birth            |               |
+------------------+---------------+
| Not on file      |               |
+------------------+---------------+
 
 
 
+----------------+-------------+-------------+
| Job Start Date | Occupation  | Industry    |
+----------------+-------------+-------------+
| Not on file    | Not on file | Not on file |
+----------------+-------------+-------------+
 
 
 
+----------------+--------------+------------+
| Travel History | Travel Start | Travel End |
+----------------+--------------+------------+
 
 
 
+-------------------------------------+
| No recent travel history available. |
+-------------------------------------+
 documented as of this encounter
 
 Plan of Treatment
 
 
+--------+-----------+------------+----------------------+-------------------+
| Date   | Type      | Specialty  | Care Team            | Description       |
+--------+-----------+------------+----------------------+-------------------+
| / | Office    | Cardiology |   HellalfredoTonia,    |                   |
|    | Visit     |            | ARNP  [address]   |                   |
|        |           |            | St  [place], [place]  |                   |
|        |           |            | [address]  [phone]  |                   |
|        |           |            |  [phone] (Fax)  |                   |
+--------+-----------+------------+----------------------+-------------------+
| / | Hospital  | Radiology  |   Popeye Townsend, |                   |
|    | Encounter |            |  MD  [address] |                   |
|        |           |            |  St.  [place],   |                   |
|        |           |            | [place] [address]             |                   |
|        |           |            | [phone]         |                   |
|        |           |            | [phone] (Fax)   |                   |
+--------+-----------+------------+----------------------+-------------------+
| / | Surgery   | Radiology  |   Popeye Townsend, | CV EP PPM SYSTEM  |
 
|    |           |            |  MD  [address] | IMPLANT           |
|        |           |            |  St.  [place],   |                   |
|        |           |            | [place] [address]             |                   |
|        |           |            | [phone]         |                   |
|        |           |            | [phone] (Fax)   |                   |
+--------+-----------+------------+----------------------+-------------------+
| 02/10/ | Clinical  | Cardiology |                      |                   |
|    | Support   |            |                      |                   |
+--------+-----------+------------+----------------------+-------------------+
| / | Office    | Cardiology |   Tonia Wilson,    |                   |
|    | Visit     |            | ARNP  [address]   |                   |
|        |           |            | [place]  |                   |
|        |           |            | [address]  [phone]  |                   |
|        |           |            |  [phone] (Fax)  |                   |
+--------+-----------+------------+----------------------+-------------------+
| / | Off-Site  | Nephrology |   Marzena Moser  |                   |
|    | Visit     |            | DO ETIENNE  [address] [place] [address]      |                   |
|        |           |            | [address]      |                   |
|        |           |            | [place]      |                   |
|        |           |            | [address]  [phone]  |                   |
|        |           |            |  [phone] (Fax)  |                   |
+--------+-----------+------------+----------------------+-------------------+
 documented as of this encounter
 
 Visit Diagnoses
 Not on filedocumented in this encounter

## 2020-01-08 NOTE — XMS
Encounter Summary
  Created on: 2020
 
 Viviana Ricci
 External Reference #: [id number]
 : 47
 Sex: Female
 
 Demographics
 
 
+-----------------------+----------------------+
| Address               | [address]      |
|                       | [place]  [address] |
+-----------------------+----------------------+
| Home Phone            | [phone]      |
+-----------------------+----------------------+
| Preferred Language    | Unknown              |
+-----------------------+----------------------+
| Marital Status        | Single               |
+-----------------------+----------------------+
| Mormonism Affiliation | [id number]                 |
+-----------------------+----------------------+
| Race                  | Unknown              |
+-----------------------+----------------------+
| Ethnic Group          | Unknown              |
+-----------------------+----------------------+
 
 
 Author
 
 
+--------------+--------------------------------------------+
| Author       | [facility]  |
|              | and [facility]                                |
+--------------+--------------------------------------------+
| Organization | [facility]  |
|              | and [facility]                                |
+--------------+--------------------------------------------+
| Address      | Unknown                                    |
+--------------+--------------------------------------------+
| Phone        | Unavailable                                |
+--------------+--------------------------------------------+
 
 
 
 Support
 
 
+----------------+--------------+---------------------+-----------------+
| Name           | Relationship | Address             | Phone           |
+----------------+--------------+---------------------+-----------------+
| Ada/Ed Radhames | ECON         | [address]              | [phone] |
|                |              | [place]  |                 |
|                |              |  [id number]              |                 |
+----------------+--------------+---------------------+-----------------+
 
 
 
 
 Care Team Providers
 
 
+-----------------------+------+-------------+
| Care Team Member Name | Role | Phone       |
+-----------------------+------+-------------+
 PCP  | Unavailable |
+-----------------------+------+-------------+
 
 
 
 Encounter Details
 
 
+--------+-----------+----------------------+----------------------+----------------------+
| Date   | Type      | Department           | Care Team            | Description          |
+--------+-----------+----------------------+----------------------+----------------------+
| 08/15/ | Hospital  |   [facility] |   Andres Avina    | Chronic diastolic    |
| 2018   | Encounter |  MED CTR XRAY  [address]    | CHF (congestive      |
|        |           |  [address]       | [address]  [place],  | heart failure), NYHA |
|        |           | [place] [address] | [place] [address]             |  class 3 (HCC)       |
|        |           |   [phone]       | [phone]         |                      |
|        |           |                      | [phone] (Fax)   |                      |
+--------+-----------+----------------------+----------------------+----------------------+
 
 
 
 Social History
 
 
+--------------+-------+-----------+--------+------+
| Tobacco Use  | Types | Packs/Day | Years  | Date |
|              |       |           | Used   |      |
+--------------+-------+-----------+--------+------+
| Never Smoker |       |           |        |      |
+--------------+-------+-----------+--------+------+
 
 
 
+---------------------+---+---+---+
| Smokeless Tobacco:  |   |   |   |
| Never Used          |   |   |   |
+---------------------+---+---+---+
 
 
 
+---------------------------------------------------------------+
| Comments: some second hand smoke exposure, but fairly minimal |
+---------------------------------------------------------------+
 
 
 
+-------------+-------------+---------+----------+
| Alcohol Use | Drinks/Week | oz/Week | Comments |
+-------------+-------------+---------+----------+
| No          |             |         |          |
+-------------+-------------+---------+----------+
 
 
 
 
+------------------+---------------+
| Sex Assigned at  | Date Recorded |
| Birth            |               |
+------------------+---------------+
| Not on file      |               |
+------------------+---------------+
 
 
 
+----------------+-------------+-------------+
| Job Start Date | Occupation  | Industry    |
+----------------+-------------+-------------+
| Not on file    | Not on file | Not on file |
+----------------+-------------+-------------+
 
 
 
+----------------+--------------+------------+
| Travel History | Travel Start | Travel End |
+----------------+--------------+------------+
 
 
 
+-------------------------------------+
| No recent travel history available. |
+-------------------------------------+
 documented as of this encounter
 
 Medications at Time of Discharge
 
 
+----------------------+----------------------+-----------+---------+----------+-----------+
| Medication           | Sig                  | Dispensed | Refills | Start    | End Date  |
|                      |                      |           |         | Date     |           |
+----------------------+----------------------+-----------+---------+----------+-----------+
|   allopurinol        | take 1 tablet by     |   30      | 11      | 20 |           |
| (ZYLOPRIM) 100 mg    | mouth once daily     | tablet    |         | 18       |           |
| tablet               |                      |           |         |          |           |
+----------------------+----------------------+-----------+---------+----------+-----------+
|   aspirin 81 mg EC   | Take 81 mg by mouth  |           | 0       |          |           |
| tablet               | Daily.               |           |         |          |           |
+----------------------+----------------------+-----------+---------+----------+-----------+
|   cholecalciferol    | Take 2,000 Units by  |           | 0       |          |           |
| (VITAMIN D-3) 2000   | mouth Every other    |           |         |          |           |
| UNITS                | day.                 |           |         |          |           |
| TABSIndications:     |                      |           |         |          |           |
| Unspecified          |                      |           |         |          |           |
| hypertensive kidney  |                      |           |         |          |           |
| disease with chronic |                      |           |         |          |           |
|  kidney disease      |                      |           |         |          |           |
| stage I through      |                      |           |         |          |           |
| stage IV, or         |                      |           |         |          |           |
| unspecified(403.90), |                      |           |         |          |           |
|  FSGS (focal         |                      |           |         |          |           |
| segmental            |                      |           |         |          |           |
| glomerulosclerosis), |                      |           |         |          |           |
|  Hyperlipidemia,     |                      |           |         |          |           |
| Complications of     |                      |           |         |          |           |
| transplanted kidney  |                      |           |         |          |           |
 
+----------------------+----------------------+-----------+---------+----------+-----------+
|   glucose blood VI   | Check blood sugar    |   100     | 12      | 20 |           |
| test strips (ONE     | before each meal and | each      |         | 12       |           |
| TOUCH ULTRA TEST)    |  as directed         |           |         |          |           |
| stripIndications:    |                      |           |         |          |           |
| Unspecified          |                      |           |         |          |           |
| hypertensive kidney  |                      |           |         |          |           |
| disease with chronic |                      |           |         |          |           |
|  kidney disease      |                      |           |         |          |           |
| stage I through      |                      |           |         |          |           |
| stage IV, or         |                      |           |         |          |           |
| unspecified(403.90), |                      |           |         |          |           |
|  Complications of    |                      |           |         |          |           |
| transplanted kidney, |                      |           |         |          |           |
|  Diabetes mellitus   |                      |           |         |          |           |
| type II,             |                      |           |         |          |           |
| uncontrolled (HCC),  |                      |           |         |          |           |
| Hyperlipidemia       |                      |           |         |          |           |
+----------------------+----------------------+-----------+---------+----------+-----------+
|   insulin lispro     | inject               |   10 vial | 11      | 11/15/20 |           |
| (HUMALOG) 100        | subcutaneously       |           |         | 17       |           |
| units/mL injection   | BEFORE MEALS         |           |         |          |           |
| (vial)Indications:   | ACCORDING TO SLIDING |           |         |          |           |
| Type 2 diabetes      |  SCALE Max dose 20   |           |         |          |           |
| mellitus with        | units daily          |           |         |          |           |
| complication, with   |                      |           |         |          |           |
| long-term current    |                      |           |         |          |           |
| use of insulin ([facility]) |                      |           |         |          |           |
+----------------------+----------------------+-----------+---------+----------+-----------+
|   lisinopril         | take 1 tablet by     |   90      | 3       | 20 |           |
| (PRINIVIL,ZESTRIL)   | mouth once daily     | tablet    |         | 17       |           |
| 30 MG tablet         |                      |           |         |          |           |
+----------------------+----------------------+-----------+---------+----------+-----------+
|   loperamide         | Take 1 capsule by    |   60      | 5       | 20 |           |
| (ANTI-DIARRHEAL) 2   | mouth 4 times daily  | capsule   |         | 14       |           |
| mg                   | as needed.           |           |         |          |           |
| capsuleIndications:  |                      |           |         |          |           |
| Unspecified          |                      |           |         |          |           |
| hypertensive kidney  |                      |           |         |          |           |
| disease with chronic |                      |           |         |          |           |
|  kidney disease      |                      |           |         |          |           |
| stage I through      |                      |           |         |          |           |
| stage IV, or         |                      |           |         |          |           |
| unspecified(403.90), |                      |           |         |          |           |
|  FSGS (focal         |                      |           |         |          |           |
| segmental            |                      |           |         |          |           |
| glomerulosclerosis), |                      |           |         |          |           |
|  Hypothyroidism,     |                      |           |         |          |           |
| Hyperlipidemia       |                      |           |         |          |           |
+----------------------+----------------------+-----------+---------+----------+-----------+
|   losartan (COZAAR)  | take 1 tablet by     |   90      | 3       | 20 |           |
| 50 mg tablet         | mouth once daily     | tablet    |         | 18       |           |
+----------------------+----------------------+-----------+---------+----------+-----------+
|   Prenatal Vit-Fe    | take 1 tablet by     |   30      | 11      | 20 |           |
| Fumarate-FA (PNV     | mouth once daily.    | tablet    |         | 18       |           |
| PRENATAL PLUS        |                      |           |         |          |           |
| MULTIVITAMIN) 27-1   |                      |           |         |          |           |
| MG TABS              |                      |           |         |          |           |
+----------------------+----------------------+-----------+---------+----------+-----------+
|   Respiratory        | Decrease CPAP to     |   1 each  | 0       | 20 |           |
 
| Therapy Supplies     | 12-18 cmH2O          |           |         | 13       |           |
| MISC                 | Diagnosis            |           |         |          |           |
|                      | Code(s)327.23.       |           |         |          |           |
|                      | Please send order to |           |         |          |           |
|                      |  [facility].     |           |         |          |           |
+----------------------+----------------------+-----------+---------+----------+-----------+
|   B-D INS SYRINGE    | use as directed      |   100     | 11      | 20 |  |
| 0.5CC/31GX5/16 31G X | BEFORE MEALS         | each      |         | 17       | 8         |
|  " 0.5 ML MISC   |                      |           |         |          |           |
+----------------------+----------------------+-----------+---------+----------+-----------+
|   fludrocortisone    | take 1 tablet by     |   90      | 4       | 20 |  |
| (FLORINEF) 0.1 mg    | mouth every other    | tablet    |         | 18       | 9         |
| tablet               | day                  |           |         |          |           |
+----------------------+----------------------+-----------+---------+----------+-----------+
|   furosemide (LASIX) | Take 1 tablet by     |   30      | 5       | 20 |  |
|  40 mg               | mouth Daily as       | tablet    |         | 15       | 8         |
| tabletIndications:   | needed.              |           |         |          |           |
| Unspecified          |                      |           |         |          |           |
| hypertensive kidney  |                      |           |         |          |           |
| disease with chronic |                      |           |         |          |           |
|  kidney disease      |                      |           |         |          |           |
| stage I through      |                      |           |         |          |           |
| stage IV, or         |                      |           |         |          |           |
| unspecified(403.90), |                      |           |         |          |           |
|  FSGS (focal         |                      |           |         |          |           |
| segmental            |                      |           |         |          |           |
| glomerulosclerosis), |                      |           |         |          |           |
|  Hyperlipidemia      |                      |           |         |          |           |
+----------------------+----------------------+-----------+---------+----------+-----------+
|   LANTUS 100 UNIT/ML | inject 20 units      |   10 vial | 11      | 10/27/20 |  |
|  injection (vial)    | subcutaneously every |           |         | 17       | 8         |
|                      |  morning             |           |         |          |           |
+----------------------+----------------------+-----------+---------+----------+-----------+
|   levothyroxine      | take 1 tablet by     |   30      | 11      | 20 |  |
| (SYNTHROID) 50 mcg   | mouth once daily     | tablet    |         | 17       | 8         |
| tablet               |                      |           |         |          |           |
+----------------------+----------------------+-----------+---------+----------+-----------+
|   magnesium oxide    | take 1 tablet by     |   60      | 11      | 20 | 10/02/201 |
| (MAG-OX) 400 mg      | mouth twice a day    | tablet    |         | 17       | 8         |
| tablet               |                      |           |         |          |           |
+----------------------+----------------------+-----------+---------+----------+-----------+
|   metoprolol         | take 1 tablet by     |   60      | 11      | 20 |  |
| tartrate (LOPRESSOR) | mouth twice a day    | tablet    |         | 18       | 9         |
|  25 mg tablet        |                      |           |         |          |           |
+----------------------+----------------------+-----------+---------+----------+-----------+
|   mycophenolate      | Take 250 mg by mouth |           | 0       |          |  |
| (CELLCEPT) 250 mg    |  2 times daily.      |           |         |          | 8         |
| capsule              |                      |           |         |          |           |
+----------------------+----------------------+-----------+---------+----------+-----------+
|   omeprazole         | Take 20 mg by mouth  |           | 0       |          |  |
| (PRILOSEC) 20 mg     | every morning        |           |         |          | 9         |
| capsule              | (before breakfast).  |           |         |          |           |
+----------------------+----------------------+-----------+---------+----------+-----------+
|   predniSONE         | Take 5 mg by mouth   |           | 0       |          | 10/02/201 |
| (DELTASONE) 5 mg     | Daily.               |           |         |          | 8         |
| tablet               |                      |           |         |          |           |
+----------------------+----------------------+-----------+---------+----------+-----------+
|   rosuvastatin       | take 1 tablet by     |   30      | 11      | 20 |  |
| (CRESTOR) 20 mg      | mouth NIGHTLY        | tablet    |         | 17       | 8         |
| tablet               |                      |           |         |          |           |
 
+----------------------+----------------------+-----------+---------+----------+-----------+
|   SENSIPAR 30 MG     | take 1 tablet by     |   30      | 11      | 20 |  |
| tablet               | mouth once daily     | tablet    |         | 18       | 9         |
+----------------------+----------------------+-----------+---------+----------+-----------+
|   tacrolimus         | Take 1 capsule by    |   30      | 11      | 20 | 11/15/201 |
| (PROGRAF) 0.5 mg     | mouth every morning. | capsule   |         | 18       | 8         |
| capsuleIndications:  |  For a total of 1.5  |           |         |          |           |
| Kidney replaced by   | mg, A.M., and 1 mg,  |           |         |          |           |
| transplant           | PM.                  |           |         |          |           |
+----------------------+----------------------+-----------+---------+----------+-----------+
|   tacrolimus         | Take 1 capsule by    |   60      | 11      | 20 | 11/15/201 |
| (PROGRAF) 1 mg       | mouth 2 times daily. | capsule   |         | 18       | 8         |
| capsuleIndications:  |  For a total of 1.5  |           |         |          |           |
| Kidney replaced by   | mg, A.M., and 1 mg,  |           |         |          |           |
| transplant           | PM.                  |           |         |          |           |
+----------------------+----------------------+-----------+---------+----------+-----------+
 documented as of this encounter
 
 Plan of Treatment
 
 
+--------+-----------+------------+----------------------+-------------------+
| Date   | Type      | Specialty  | Care Team            | Description       |
+--------+-----------+------------+----------------------+-------------------+
| / | Office    | Cardiology |   Tonia Wilson,    |                   |
|    | Visit     |            | ARNP  [address]   |                   |
|        |           |            | [place]  |                   |
|        |           |            | [id number] [phone]  |                   |
|        |           |            |  [phone] (Fax)  |                   |
+--------+-----------+------------+----------------------+-------------------+
| / | Hospital  | Radiology  |   Popeye Townsend, |                   |
|    | Encounter |            |  MD  [address] |                   |
|        |           |            |  [place]   |                   |
|        |           |            | [place] [address]             |                   |
|        |           |            | [phone]         |                   |
|        |           |            | [phone] (Fax)   |                   |
+--------+-----------+------------+----------------------+-------------------+
| / | Surgery   | Radiology  |   Popeye Townsend, | CV EP PPM SYSTEM  |
|    |           |            |  MD  [address] | IMPLANT           |
|        |           |            |  [place],   |                   |
|        |           |            | [place] [address]             |                   |
|        |           |            | [phone]         |                   |
|        |           |            | [phone] (Fax)   |                   |
+--------+-----------+------------+----------------------+-------------------+
| 02/10/ | Clinical  | Cardiology |                      |                   |
|    | Support   |            |                      |                   |
+--------+-----------+------------+----------------------+-------------------+
| / | Office    | Cardiology |   Tonia Wilson,    |                   |
|    | Visit     |            | ARNP  [address]   |                   |
|        |           |            | [place]  |                   |
|        |           |            | [address]  [phone]  |                   |
|        |           |            |  [phone] (Fax)  |                   |
+--------+-----------+------------+----------------------+-------------------+
| / | Off-Site  | Nephrology |   Shanti Marzena  |                   |
|    | Visit     |            | DO ETIENNE  [address] [place] [address]      |                   |
|        |           |            | [address]      |                   |
|        |           |            | [place]      |                   |
|        |           |            | [address]  [phone]  |                   |
|        |           |            |  [phone] (Fax)  |                   |
+--------+-----------+------------+----------------------+-------------------+
 
 documented as of this encounter
 
 Procedures
 
 
+------------------+--------+-------------+----------------------+----------------------+
| Procedure Name   | Priori | Date/Time   | Associated Diagnosis | Comments             |
|                  | ty     |             |                      |                      |
+------------------+--------+-------------+----------------------+----------------------+
| XR CHEST PA AND  | Routin | 08/15/2018  |   Chronic diastolic  |   Results for this   |
| LATERAL          | e      | 12:28 PM    | CHF (congestive      | procedure are in the |
|                  |        | PDT         | heart failure), NYHA |  results section.    |
|                  |        |             |  class 3 (HCC)       |                      |
+------------------+--------+-------------+----------------------+----------------------+
 documented in this encounter
 
 Results
 XR Chest PA and Lateral (08/15/2018 12:28 PM PDT)
 
+----------+
| Specimen |
+----------+
|          |
+----------+
 
 
 
+------------------------------------------------------------------------+---------------+
| Narrative                                                              | Performed At  |
+------------------------------------------------------------------------+---------------+
|   CLINICAL INFORMATION: under-treated partially compensated CHF.       |   [facility] |
| COMPARISON: 3 2016.     FINDINGS:   Frontal and lateral views of the   |               |
| chest.      Median sternotomy changes.     Lungs: No focal airspace    |               |
| disease, pleural effusion, or pneumothorax.   Minimal  bilateral       |               |
| basilar dependent atelectasis versus scarring.     Heart/mediastinum:  |               |
| Borderline cardiomegaly. No mediastinal widening.     Bones: No acute  |               |
| osseous abnormality appreciated.     IMPRESSION - Borderline           |               |
| cardiomegaly.   No acute pulmonary disease.     Dictated and Signed    |               |
| by: Jorge Thomas MD    Electronically signed: 8/15/2018 2:16 PM    |               |
+------------------------------------------------------------------------+---------------+
 
 
 
+------------------------------------------------------------------------------------------+
| Procedure Note                                                                           |
+------------------------------------------------------------------------------------------+
|   Ralf, Rad Results In - 08/15/2018  2:19 PM PDT  CLINICAL INFORMATION: under-treated     |
| partially compensated CHF.COMPARISON: 3 2016.FINDINGS: Frontal and lateral views of the  |
| chest. Median sternotomy changes.Lungs: No focal airspace disease, pleural effusion, or  |
| pneumothorax.  Minimalbilateral basilar dependent atelectasis versus                     |
| scarring.Heart/mediastinum: Borderline cardiomegaly. No mediastinal widening.Bones: No   |
| acute osseous abnormality appreciated.IMPRESSION - Borderline cardiomegaly.  No acute    |
| pulmonary disease.Dictated and Signed by: Jorge Thomas MD  Electronically signed:     |
| 8/15/2018 2:16 PM                                                                        |
|                                                                                          |
|Lungs: No focal airspace disease, pleural effusion, or pneumothorax.  Minimal             |
|bilateral basilar dependent atelectasis versus scarring.                                  |
|                                                                                          |
|Heart/mediastinum: Borderline cardiomegaly. No mediastinal widening.                      |
|                                                                                          |
 
|Bones: No acute osseous abnormality appreciated.                                          |
|                                                                                          |
|IMPRESSION - Borderline cardiomegaly.  No acute pulmonary disease.                        |
|                                                                                          |
|Dictated and Signed by: Jorge Thomas MD                                                |
| Electronically signed: 8/15/2018 2:16 PM                                                 |
+------------------------------------------------------------------------------------------+
 
 
 
+---------------+---------+--------------------+--------------+
| Performing    | Address | City/State/Zipcode | Phone Number |
| Organization  |         |                    |              |
+---------------+---------+--------------------+--------------+
|   [facility] |         |                    |              |
+---------------+---------+--------------------+--------------+
 documented in this encounter
 
 Visit Diagnoses
 
 
+------------------------------------------------------------------------+
| Diagnosis                                                              |
+------------------------------------------------------------------------+
|   Chronic diastolic CHF (congestive heart failure), NYHA class 3 (HCC) |
+------------------------------------------------------------------------+
 documented in this encounter

## 2020-01-08 NOTE — XMS
Encounter Summary
  Created on: 2020
 
 Viviana Ricci
 External Reference #: [id number]
 : 47
 Sex: Female
 
 Demographics
 
 
+-----------------------+----------------------+
| Address               | [address]      |
|                       | [place]  [address] |
+-----------------------+----------------------+
| Home Phone            | [phone]      |
+-----------------------+----------------------+
| Preferred Language    | Unknown              |
+-----------------------+----------------------+
| Marital Status        | Single               |
+-----------------------+----------------------+
| [employer] Affiliation | [id number]                 |
+-----------------------+----------------------+
| Race                  | Unknown              |
+-----------------------+----------------------+
| Ethnic Group          | Unknown              |
+-----------------------+----------------------+
 
 
 Author
 
 
+--------------+--------------------------------------------+
| Author       | [facility]  |
|              | and [facility]                                |
+--------------+--------------------------------------------+
| Organization | [facility]  |
|              | and [facility]                                |
+--------------+--------------------------------------------+
| Address      | Unknown                                    |
+--------------+--------------------------------------------+
| Phone        | Unavailable                                |
+--------------+--------------------------------------------+
 
 
 
 Support
 
 
+----------------+--------------+---------------------+-----------------+
| Name           | Relationship | Address             | Phone           |
+----------------+--------------+---------------------+-----------------+
| Ada/Ed Radhames | ECON         | [address]              | [phone] |
|                |              | [place]  |                 |
|                |              |  [address]              |                 |
+----------------+--------------+---------------------+-----------------+
 
 
 
 
 Care Team Providers
 
 
+-----------------------+------+-------------+
| Care Team Member Name | Role | Phone       |
+-----------------------+------+-------------+
 PCP  | Unavailable |
+-----------------------+------+-------------+
 
 
 
 Encounter Details
 
 
+--------+----------+---------------------+----------------------+-------------+
| Date   | Type     | Department          | Care Team            | Description |
+--------+----------+---------------------+----------------------+-------------+
| / | Abstract |   [facility] [place]         |   Marzena Moser  |             |
|    |          | NEPHROLOGY  [address]   | M, DO  [address]      |             |
|        |          | [address]   | [address]      |             |
|        |          | [place]     | [place]      |             |
|        |          | [address]          | [address]  [phone]  |             |
|        |          | [phone]        |  [phone] (Fax)  |             |
+--------+----------+---------------------+----------------------+-------------+
 
 
 
 Social History
 
 
+--------------+-------+-----------+--------+------+
| Tobacco Use  | Types | Packs/Day | Years  | Date |
|              |       |           | Used   |      |
+--------------+-------+-----------+--------+------+
| Never Smoker |       |           |        |      |
+--------------+-------+-----------+--------+------+
 
 
 
+---------------------+---+---+---+
| Smokeless Tobacco:  |   |   |   |
| Never Used          |   |   |   |
+---------------------+---+---+---+
 
 
 
+---------------------------------------------------------------+
| Comments: some second hand smoke exposure, but fairly minimal |
+---------------------------------------------------------------+
 
 
 
+-------------+-------------+---------+----------+
| Alcohol Use | Drinks/Week | oz/Week | Comments |
+-------------+-------------+---------+----------+
| No          |             |         |          |
+-------------+-------------+---------+----------+
 
 
 
 
+------------------+---------------+
| Sex Assigned at  | Date Recorded |
| Birth            |               |
+------------------+---------------+
| Not on file      |               |
+------------------+---------------+
 
 
 
+----------------+-------------+-------------+
| Job Start Date | Occupation  | Industry    |
+----------------+-------------+-------------+
| Not on file    | Not on file | Not on file |
+----------------+-------------+-------------+
 
 
 
+----------------+--------------+------------+
| Travel History | Travel Start | Travel End |
+----------------+--------------+------------+
 
 
 
+-------------------------------------+
| No recent travel history available. |
+-------------------------------------+
 documented as of this encounter
 
 Plan of Treatment
 
 
+--------+-----------+------------+----------------------+-------------------+
| Date   | Type      | Specialty  | Care Team            | Description       |
+--------+-----------+------------+----------------------+-------------------+
| / | Office    | Cardiology |   Tonia Wilson,    |                   |
|    | Visit     |            | ARNJESSICA  [address]   |                   |
|        |           |            | [place]  |                   |
|        |           |            | [address]  [phone]  |                   |
|        |           |            |  [phone] (Fax)  |                   |
+--------+-----------+------------+----------------------+-------------------+
| / | Hospital  | Radiology  |   Popeye Townsend, |                   |
|    | Encounter |            |  MD  [address] |                   |
|        |           |            |  St.  [place],   |                   |
|        |           |            | [place] [address]             |                   |
|        |           |            | [phone]         |                   |
|        |           |            | [phone] (Fax)   |                   |
+--------+-----------+------------+----------------------+-------------------+
| / | Surgery   | Radiology  |   Popeye Townsend, | CV EP PPM SYSTEM  |
|    |           |            |  MD  [address] | IMPLANT           |
|        |           |            |  St.  [place],   |                   |
|        |           |            | [place] [address]             |                   |
|        |           |            | [phone]         |                   |
|        |           |            | [phone] (Fax)   |                   |
+--------+-----------+------------+----------------------+-------------------+
| 02/10/ | Clinical  | Cardiology |                      |                   |
|    | Support   |            |                      |                   |
+--------+-----------+------------+----------------------+-------------------+
| / | Office    | Cardiology |   Tonia Wilson,    |                   |
|    | Visit     |            | ARNP  [address]   |                   |
 
|        |           |            | St  [place], [place]  |                   |
|        |           |            | [address]  [phone]  |                   |
|        |           |            |  [phone] (Fax)  |                   |
+--------+-----------+------------+----------------------+-------------------+
| / | Off-Site  | Nephrology |   Marzena Moser  |                   |
|    | Visit     |            | DO ETIENNE  [address] [place] [address]      |                   |
|        |           |            | [address]      |                   |
|        |           |            | [place]      |                   |
|        |           |            | [address]  [phone]  |                   |
|        |           |            |  [phone] (Fax)  |                   |
+--------+-----------+------------+----------------------+-------------------+
 documented as of this encounter
 
 Procedures
 
 
+----------------------+--------+------------+----------------------+----------------------+
| Procedure Name       | Priori | Date/Time  | Associated Diagnosis | Comments             |
|                      | ty     |            |                      |                      |
+----------------------+--------+------------+----------------------+----------------------+
| EXTERNAL LAB: BUN    | Routin | 2016 |                      |   Results for this   |
|                      | e      |            |                      | procedure are in the |
|                      |        |            |                      |  results section.    |
+----------------------+--------+------------+----------------------+----------------------+
| EXTERNAL LAB:        | Routin | 2016 |                      |   Results for this   |
| GLUCOSE              | e      |            |                      | procedure are in the |
|                      |        |            |                      |  results section.    |
+----------------------+--------+------------+----------------------+----------------------+
| EXTERNAL LAB: ALT    | Jerry | 2016 |                      |   Results for this   |
|                      | e      |            |                      | procedure are in the |
|                      |        |            |                      |  results section.    |
+----------------------+--------+------------+----------------------+----------------------+
| EXTERNAL LAB: AST    | Routin | 2016 |                      |   Results for this   |
|                      | e      |            |                      | procedure are in the |
|                      |        |            |                      |  results section.    |
+----------------------+--------+------------+----------------------+----------------------+
| EXTERNAL LAB:        | Routin | 2016 |                      |   Results for this   |
| ALBUMIN              | e      |            |                      | procedure are in the |
|                      |        |            |                      |  results section.    |
+----------------------+--------+------------+----------------------+----------------------+
| EXTERNAL LAB:        | Routin | 2016 |                      |   Results for this   |
| PROTEIN, TOTAL       | e      |            |                      | procedure are in the |
|                      |        |            |                      |  results section.    |
+----------------------+--------+------------+----------------------+----------------------+
| EXTERNAL LAB:        | Routin | 2016 |                      |   Results for this   |
| PHOSPHORUS           | e      |            |                      | procedure are in the |
|                      |        |            |                      |  results section.    |
+----------------------+--------+------------+----------------------+----------------------+
| EXTERNAL LAB:        | Routin | 2016 |                      |   Results for this   |
| MAGNESIUM            | e      |            |                      | procedure are in the |
|                      |        |            |                      |  results section.    |
+----------------------+--------+------------+----------------------+----------------------+
| EXTERNAL LAB:        | Routin | 2016 |                      |   Results for this   |
| CALCIUM              | e      |            |                      | procedure are in the |
|                      |        |            |                      |  results section.    |
+----------------------+--------+------------+----------------------+----------------------+
| EXTERNAL LAB: CARBON | Routin | 2016 |                      |   Results for this   |
|  DIOXIDE             | e      |            |                      | procedure are in the |
|                      |        |            |                      |  results section.    |
+----------------------+--------+------------+----------------------+----------------------+
 
| EXTERNAL LAB:        | Routin | 2016 |                      |   Results for this   |
| CHLORIDE             | e      |            |                      | procedure are in the |
|                      |        |            |                      |  results section.    |
+----------------------+--------+------------+----------------------+----------------------+
| EXTERNAL LAB:        | Routin | 2016 |                      |   Results for this   |
| POTASSIUM            | e      |            |                      | procedure are in the |
|                      |        |            |                      |  results section.    |
+----------------------+--------+------------+----------------------+----------------------+
| EXTERNAL LAB: SODIUM | Routin | 2016 |                      |   Results for this   |
|                      | e      |            |                      | procedure are in the |
|                      |        |            |                      |  results section.    |
+----------------------+--------+------------+----------------------+----------------------+
| EXTERNAL LAB: CBC    | Routin | 2016 |                      |   Results for this   |
|                      | e      |            |                      | procedure are in the |
|                      |        |            |                      |  results section.    |
+----------------------+--------+------------+----------------------+----------------------+
| EXTERNAL LAB:        | Routin | 2016 |                      |   Results for this   |
| TRIGLYCERIDES        | e      |            |                      | procedure are in the |
|                      |        |            |                      |  results section.    |
+----------------------+--------+------------+----------------------+----------------------+
| EXTERNAL LAB:        | Routin | 2016 |                      |   Results for this   |
| CHOLESTEROL, HDL     | e      |            |                      | procedure are in the |
|                      |        |            |                      |  results section.    |
+----------------------+--------+------------+----------------------+----------------------+
| EXTERNAL LAB:        | Routin | 2016 |                      |   Results for this   |
| CHOLESTEROL, TOTAL   | e      |            |                      | procedure are in the |
|                      |        |            |                      |  results section.    |
+----------------------+--------+------------+----------------------+----------------------+
| EXTERNAL LAB:        | Routin | 2016 |                      |   Results for this   |
| CHOLESTEROL, LDL     | e      |            |                      | procedure are in the |
|                      |        |            |                      |  results section.    |
+----------------------+--------+------------+----------------------+----------------------+
| EXTERNAL LAB: EGFR   | Routin | 2016 |                      |   Results for this   |
|                      | e      |            |                      | procedure are in the |
|                      |        |            |                      |  results section.    |
+----------------------+--------+------------+----------------------+----------------------+
| EXTERNAL LAB:        | Routin | 2016 |                      |   Results for this   |
| CREATININE           | e      |            |                      | procedure are in the |
|                      |        |            |                      |  results section.    |
+----------------------+--------+------------+----------------------+----------------------+
| TACROLIMUS ()   | Routin | 2016 |                      |   Results for this   |
| TROUGH               | e      |            |                      | procedure are in the |
|                      |        |            |                      |  results section.    |
+----------------------+--------+------------+----------------------+----------------------+
| HEMOGLOBIN A1C       | Routin | 2016 |                      |   Results for this   |
|                      | e      |            |                      | procedure are in the |
|                      |        |            |                      |  results section.    |
+----------------------+--------+------------+----------------------+----------------------+
 documented in this encounter
 
 Results
 Tacrolimus () Trough (2016)
 
+-------------+-------+-----------+-------------+--------------+
| Component   | Value | Ref Range | Performed   | Pathologist  |
|             |       |           | At          | Signature    |
+-------------+-------+-----------+-------------+--------------+
| Tacrolimus, | 8.4   |           | [facility]  |              |
|  [facility]      |       |           | [facility]    |              |
| External    |       |           | [facility]     |              |
 
|             |       |           | [facility] -    |              |
|             |       |           | LABORATORY  |              |
+-------------+-------+-----------+-------------+--------------+
 
 
 
+-----------------+
| Specimen        |
+-----------------+
| Blood specimen  |
| (specimen)      |
+-----------------+
 
 
 
+----------------------+--------------------+--------------------+----------------+
| Performing           | Address            | City/State/Zipcode | Phone Number   |
| Organization         |                    |                    |                |
+----------------------+--------------------+--------------------+----------------+
|   [facility]     |   [address] | [place]    |   [phone] |
| [facility]  |                    | [address]              |                |
| - LABORATORY         |                    |                    |                |
+----------------------+--------------------+--------------------+----------------+
 Hemoglobin A1C (2016)
 
+-------------+-------+-----------+------------+--------------+
| Component   | Value | Ref Range | Performed  | Pathologist  |
|             |       |           | At         | Signature    |
+-------------+-------+-----------+------------+--------------+
| Hemoglobin  | 8     |           | EXTERNAL   |              |
| A1c,        |       |           | LAB        |              |
| external    |       |           |            |              |
+-------------+-------+-----------+------------+--------------+
 
 
 
+-----------------+
| Specimen        |
+-----------------+
| Blood specimen  |
| (specimen)      |
+-----------------+
 
 
 
+--------------------------+
| Resulting Agency Comment |
+--------------------------+
|   [facility]              |
+--------------------------+
 
 
 
+----------------+---------+--------------------+--------------+
| Performing     | Address | City/State/Zipcode | Phone Number |
| Organization   |         |                    |              |
+----------------+---------+--------------------+--------------+
|   EXTERNAL LAB |         |                    |              |
+----------------+---------+--------------------+--------------+
 External Lab: BUN (2016)
 
 
+-----------+--------+-----------+------------+--------------+
| Component | Value  | Ref Range | Performed  | Pathologist  |
|           |        |           | At         | Signature    |
+-----------+--------+-----------+------------+--------------+
| BUN,      | 30 (A) | 6 - 23    | EXTERNAL   |              |
| External  |        |           | LAB        |              |
+-----------+--------+-----------+------------+--------------+
 
 
 
+--------------------------+
| Resulting Agency Comment |
+--------------------------+
|   [facility]              |
+--------------------------+
 
 
 
+----------------+---------+--------------------+--------------+
| Performing     | Address | City/State/Zipcode | Phone Number |
| Organization   |         |                    |              |
+----------------+---------+--------------------+--------------+
|   EXTERNAL LAB |         |                    |              |
+----------------+---------+--------------------+--------------+
 External Lab: Glucose (2016)
 
+-----------+---------+-----------+------------+--------------+
| Component | Value   | Ref Range | Performed  | Pathologist  |
|           |         |           | At         | Signature    |
+-----------+---------+-----------+------------+--------------+
| Glucose,  | 140 (A) | 70 - 100  | EXTERNAL   |              |
| External  |         |           | LAB        |              |
+-----------+---------+-----------+------------+--------------+
 
 
 
+--------------------------+
| Resulting Agency Comment |
+--------------------------+
|   [facility]              |
+--------------------------+
 
 
 
+----------------+---------+--------------------+--------------+
| Performing     | Address | City/State/Zipcode | Phone Number |
| Organization   |         |                    |              |
+----------------+---------+--------------------+--------------+
|   EXTERNAL LAB |         |                    |              |
+----------------+---------+--------------------+--------------+
 External Lab: ALT (2016)
 
+-----------+-------+-----------+------------+--------------+
| Component | Value | Ref Range | Performed  | Pathologist  |
|           |       |           | At         | Signature    |
+-----------+-------+-----------+------------+--------------+
| ALT,      | 14    | 7 - 52    | EXTERNAL   |              |
| External  |       |           | LAB        |              |
+-----------+-------+-----------+------------+--------------+
 
 
 
 
+--------------------------+
| Resulting Agency Comment |
+--------------------------+
|   [facility]              |
+--------------------------+
 
 
 
+----------------+---------+--------------------+--------------+
| Performing     | Address | City/State/Zipcode | Phone Number |
| Organization   |         |                    |              |
+----------------+---------+--------------------+--------------+
|   EXTERNAL LAB |         |                    |              |
+----------------+---------+--------------------+--------------+
 External Lab: AST (2016)
 
+-----------+-------+-----------+------------+--------------+
| Component | Value | Ref Range | Performed  | Pathologist  |
|           |       |           | At         | Signature    |
+-----------+-------+-----------+------------+--------------+
| AST,      | 26    | 13 - 39   | EXTERNAL   |              |
| External  |       |           | LAB        |              |
+-----------+-------+-----------+------------+--------------+
 
 
 
+--------------------------+
| Resulting Agency Comment |
+--------------------------+
|   [facility]              |
+--------------------------+
 
 
 
+----------------+---------+--------------------+--------------+
| Performing     | Address | City/State/Zipcode | Phone Number |
| Organization   |         |                    |              |
+----------------+---------+--------------------+--------------+
|   EXTERNAL LAB |         |                    |              |
+----------------+---------+--------------------+--------------+
 External Lab: Albumin (2016)
 
+-----------+-------+-----------+------------+--------------+
| Component | Value | Ref Range | Performed  | Pathologist  |
|           |       |           | At         | Signature    |
+-----------+-------+-----------+------------+--------------+
| Albumin,  | 3.7   | 3.5 - 5   | EXTERNAL   |              |
| External  |       |           | LAB        |              |
+-----------+-------+-----------+------------+--------------+
 
 
 
+--------------------------+
| Resulting Agency Comment |
+--------------------------+
|   [facility]              |
+--------------------------+
 
 
 
 
+----------------+---------+--------------------+--------------+
| Performing     | Address | City/State/Zipcode | Phone Number |
| Organization   |         |                    |              |
+----------------+---------+--------------------+--------------+
|   EXTERNAL LAB |         |                    |              |
+----------------+---------+--------------------+--------------+
 External Lab: Protein, Total (2016)
 
+-----------+---------+-----------+------------+--------------+
| Component | Value   | Ref Range | Performed  | Pathologist  |
|           |         |           | At         | Signature    |
+-----------+---------+-----------+------------+--------------+
| Protein,  | 5.9 (A) | 6 - 8     | EXTERNAL   |              |
| Total,    |         |           | LAB        |              |
| External  |         |           |            |              |
+-----------+---------+-----------+------------+--------------+
 
 
 
+--------------------------+
| Resulting Agency Comment |
+--------------------------+
|   [facility]              |
+--------------------------+
 
 
 
+----------------+---------+--------------------+--------------+
| Performing     | Address | City/State/Zipcode | Phone Number |
| Organization   |         |                    |              |
+----------------+---------+--------------------+--------------+
|   EXTERNAL LAB |         |                    |              |
+----------------+---------+--------------------+--------------+
 External Lab: Phosphorus (2016)
 
+-------------+-------+-----------+------------+--------------+
| Component   | Value | Ref Range | Performed  | Pathologist  |
|             |       |           | At         | Signature    |
+-------------+-------+-----------+------------+--------------+
| Phosphorus, | 2.5   | 2.5 - 5   | EXTERNAL   |              |
|  External   |       |           | LAB        |              |
+-------------+-------+-----------+------------+--------------+
 
 
 
+--------------------------+
| Resulting Agency Comment |
+--------------------------+
|   [facility]              |
+--------------------------+
 
 
 
+----------------+---------+--------------------+--------------+
| Performing     | Address | City/State/Zipcode | Phone Number |
| Organization   |         |                    |              |
+----------------+---------+--------------------+--------------+
 
|   EXTERNAL LAB |         |                    |              |
+----------------+---------+--------------------+--------------+
 External Lab: Magnesium (2016)
 
+-------------+---------+-----------+------------+--------------+
| Component   | Value   | Ref Range | Performed  | Pathologist  |
|             |         |           | At         | Signature    |
+-------------+---------+-----------+------------+--------------+
| Magnesium,  | 1.6 (A) | 1.7 - 2.5 | EXTERNAL   |              |
| External    |         |           | LAB        |              |
+-------------+---------+-----------+------------+--------------+
 
 
 
+--------------------------+
| Resulting Agency Comment |
+--------------------------+
|   [facility]              |
+--------------------------+
 
 
 
+----------------+---------+--------------------+--------------+
| Performing     | Address | City/State/Zipcode | Phone Number |
| Organization   |         |                    |              |
+----------------+---------+--------------------+--------------+
|   EXTERNAL LAB |         |                    |              |
+----------------+---------+--------------------+--------------+
 External Lab: Calcium (2016)
 
+-----------+----------+------------+------------+--------------+
| Component | Value    | Ref Range  | Performed  | Pathologist  |
|           |          |            | At         | Signature    |
+-----------+----------+------------+------------+--------------+
| Calcium,  | 10.3 (A) | 8.4 - 10.2 | EXTERNAL   |              |
| External  |          |            | LAB        |              |
+-----------+----------+------------+------------+--------------+
 
 
 
+--------------------------+
| Resulting Agency Comment |
+--------------------------+
|   [facility]              |
+--------------------------+
 
 
 
+----------------+---------+--------------------+--------------+
| Performing     | Address | City/State/Zipcode | Phone Number |
| Organization   |         |                    |              |
+----------------+---------+--------------------+--------------+
|   EXTERNAL LAB |         |                    |              |
+----------------+---------+--------------------+--------------+
 External Lab: Carbon Dioxide (2016)
 
+-----------+--------+-----------+------------+--------------+
| Component | Value  | Ref Range | Performed  | Pathologist  |
|           |        |           | At         | Signature    |
+-----------+--------+-----------+------------+--------------+
 
| Carbon    | 18 (A) | 19 - 31   | EXTERNAL   |              |
| Dioxide,  |        |           | LAB        |              |
| External  |        |           |            |              |
+-----------+--------+-----------+------------+--------------+
 
 
 
+--------------------------+
| Resulting Agency Comment |
+--------------------------+
|   [facility]              |
+--------------------------+
 
 
 
+----------------+---------+--------------------+--------------+
| Performing     | Address | City/State/Zipcode | Phone Number |
| Organization   |         |                    |              |
+----------------+---------+--------------------+--------------+
|   EXTERNAL LAB |         |                    |              |
+----------------+---------+--------------------+--------------+
 External Lab: Chloride (2016)
 
+------------+-------+-----------+------------+--------------+
| Component  | Value | Ref Range | Performed  | Pathologist  |
|            |       |           | At         | Signature    |
+------------+-------+-----------+------------+--------------+
| Chloride,  | 106   | 95 - 112  | EXTERNAL   |              |
| External   |       |           | LAB        |              |
+------------+-------+-----------+------------+--------------+
 
 
 
+--------------------------+
| Resulting Agency Comment |
+--------------------------+
|   [facility]              |
+--------------------------+
 
 
 
+----------------+---------+--------------------+--------------+
| Performing     | Address | City/State/Zipcode | Phone Number |
| Organization   |         |                    |              |
+----------------+---------+--------------------+--------------+
|   EXTERNAL LAB |         |                    |              |
+----------------+---------+--------------------+--------------+
 External Lab: Potassium (2016)
 
+-------------+-------+-----------+------------+--------------+
| Component   | Value | Ref Range | Performed  | Pathologist  |
|             |       |           | At         | Signature    |
+-------------+-------+-----------+------------+--------------+
| Potassium,  | 4.6   | 3.6 - 5.1 | EXTERNAL   |              |
| External    |       |           | LAB        |              |
+-------------+-------+-----------+------------+--------------+
 
 
 
+--------------------------+
 
| Resulting Agency Comment |
+--------------------------+
|   [facility]              |
+--------------------------+
 
 
 
+----------------+---------+--------------------+--------------+
| Performing     | Address | City/State/Zipcode | Phone Number |
| Organization   |         |                    |              |
+----------------+---------+--------------------+--------------+
|   EXTERNAL LAB |         |                    |              |
+----------------+---------+--------------------+--------------+
 External Lab: Sodium (2016)
 
+-----------+-------+-----------+------------+--------------+
| Component | Value | Ref Range | Performed  | Pathologist  |
|           |       |           | At         | Signature    |
+-----------+-------+-----------+------------+--------------+
| Sodium,   | 138   | 132 - 143 | EXTERNAL   |              |
| External  |       |           | LAB        |              |
+-----------+-------+-----------+------------+--------------+
 
 
 
+--------------------------+
| Resulting Agency Comment |
+--------------------------+
|   [facility]              |
+--------------------------+
 
 
 
+----------------+---------+--------------------+--------------+
| Performing     | Address | City/State/Zipcode | Phone Number |
| Organization   |         |                    |              |
+----------------+---------+--------------------+--------------+
|   EXTERNAL LAB |         |                    |              |
+----------------+---------+--------------------+--------------+
 External Lab: CBC (2016)
 
+-----------+---------+-----------+------------+--------------+
| Component | Value   | Ref Range | Performed  | Pathologist  |
|           |         |           | At         | Signature    |
+-----------+---------+-----------+------------+--------------+
| WBC,      | 4.0 (A) | 4.5 - 11  | EXTERNAL   |              |
| External  |         |           | LAB        |              |
+-----------+---------+-----------+------------+--------------+
| HGB,      | 13.9    | 12 - 16   | EXTERNAL   |              |
| External  |         |           | LAB        |              |
+-----------+---------+-----------+------------+--------------+
| HCT,      | 42      | 35 - 45   | EXTERNAL   |              |
| External  |         |           | LAB        |              |
+-----------+---------+-----------+------------+--------------+
| PLT,      | 153     | 140 - 440 | EXTERNAL   |              |
| External  |         |           | LAB        |              |
+-----------+---------+-----------+------------+--------------+
| RBC,      | 4.16    | 3.8 - 5.1 | EXTERNAL   |              |
| External  |         |           | LAB        |              |
+-----------+---------+-----------+------------+--------------+
 
| MCV,      | 101 (A) | 81 - 99   | EXTERNAL   |              |
| External  |         |           | LAB        |              |
+-----------+---------+-----------+------------+--------------+
| RDW,      | 13.5    | 10.5 - 15 | EXTERNAL   |              |
| External  |         |           | LAB        |              |
+-----------+---------+-----------+------------+--------------+
 
 
 
+--------------------------+
| Resulting Agency Comment |
+--------------------------+
|   [facility]              |
+--------------------------+
 
 
 
+----------------+---------+--------------------+--------------+
| Performing     | Address | City/State/Zipcode | Phone Number |
| Organization   |         |                    |              |
+----------------+---------+--------------------+--------------+
|   EXTERNAL LAB |         |                    |              |
+----------------+---------+--------------------+--------------+
 External Lab: Triglycerides (2016)
 
+-------------+-------+-----------+------------+--------------+
| Component   | Value | Ref Range | Performed  | Pathologist  |
|             |       |           | At         | Signature    |
+-------------+-------+-----------+------------+--------------+
| Triglycerid | 167   |           | EXTERNAL   |              |
| es,         |       |           | LAB        |              |
| External    |       |           |            |              |
+-------------+-------+-----------+------------+--------------+
 
 
 
+-----------------+
| Specimen        |
+-----------------+
| Blood specimen  |
| (specimen)      |
+-----------------+
 
 
 
+--------------------------+
| Resulting Agency Comment |
+--------------------------+
|   [facility]              |
+--------------------------+
 
 
 
+----------------+---------+--------------------+--------------+
| Performing     | Address | City/State/Zipcode | Phone Number |
| Organization   |         |                    |              |
+----------------+---------+--------------------+--------------+
|   EXTERNAL LAB |         |                    |              |
+----------------+---------+--------------------+--------------+
 External Lab: Cholesterol, HDL (2016)
 
 
+-------------+-------+-----------+------------+--------------+
| Component   | Value | Ref Range | Performed  | Pathologist  |
|             |       |           | At         | Signature    |
+-------------+-------+-----------+------------+--------------+
| HDL         | 45.4  | mg/dl     | EXTERNAL   |              |
| Cholesterol |       |           | LAB        |              |
| , External  |       |           |            |              |
+-------------+-------+-----------+------------+--------------+
 
 
 
+-----------------+
| Specimen        |
+-----------------+
| Blood specimen  |
| (specimen)      |
+-----------------+
 
 
 
+--------------------------+
| Resulting Agency Comment |
+--------------------------+
|   [facility]              |
+--------------------------+
 
 
 
+----------------+---------+--------------------+--------------+
| Performing     | Address | City/State/Zipcode | Phone Number |
| Organization   |         |                    |              |
+----------------+---------+--------------------+--------------+
|   EXTERNAL LAB |         |                    |              |
+----------------+---------+--------------------+--------------+
 External Lab: Cholesterol, Total (2016)
 
+-------------+-------+-----------+------------+--------------+
| Component   | Value | Ref Range | Performed  | Pathologist  |
|             |       |           | At         | Signature    |
+-------------+-------+-----------+------------+--------------+
| Cholesterol | 161   | mg/dl     | EXTERNAL   |              |
| , Total,    |       |           | LAB        |              |
| External    |       |           |            |              |
+-------------+-------+-----------+------------+--------------+
 
 
 
+-----------------+
| Specimen        |
+-----------------+
| Blood specimen  |
| (specimen)      |
+-----------------+
 
 
 
+--------------------------+
| Resulting Agency Comment |
+--------------------------+
 
|   [facility]              |
+--------------------------+
 
 
 
+----------------+---------+--------------------+--------------+
| Performing     | Address | City/State/Zipcode | Phone Number |
| Organization   |         |                    |              |
+----------------+---------+--------------------+--------------+
|   EXTERNAL LAB |         |                    |              |
+----------------+---------+--------------------+--------------+
 External Lab: Cholesterol, LDL (2016)
 
+-------------+-------+-----------+------------+--------------+
| Component   | Value | Ref Range | Performed  | Pathologist  |
|             |       |           | At         | Signature    |
+-------------+-------+-----------+------------+--------------+
| LDL         | 82    |           | EXTERNAL   |              |
| Cholesterol |       |           | LAB        |              |
| , Direct,   |       |           |            |              |
| External    |       |           |            |              |
+-------------+-------+-----------+------------+--------------+
 
 
 
+-----------------+
| Specimen        |
+-----------------+
| Blood specimen  |
| (specimen)      |
+-----------------+
 
 
 
+--------------------------+
| Resulting Agency Comment |
+--------------------------+
|   [facility]              |
+--------------------------+
 
 
 
+----------------+---------+--------------------+--------------+
| Performing     | Address | City/State/Zipcode | Phone Number |
| Organization   |         |                    |              |
+----------------+---------+--------------------+--------------+
|   EXTERNAL LAB |         |                    |              |
+----------------+---------+--------------------+--------------+
 External Lab: eGFR (2016)
 
+-----------+-------+-----------+------------+--------------+
| Component | Value | Ref Range | Performed  | Pathologist  |
|           |       |           | At         | Signature    |
+-----------+-------+-----------+------------+--------------+
| eGFR,     | 45    |           | EXTERNAL   |              |
| External  |       |           | LAB        |              |
+-----------+-------+-----------+------------+--------------+
 
 
 
 
+-----------------+
| Specimen        |
+-----------------+
| Blood specimen  |
| (specimen)      |
+-----------------+
 
 
 
+--------------------------+
| Resulting Agency Comment |
+--------------------------+
|   [facility]              |
+--------------------------+
 
 
 
+----------------+---------+--------------------+--------------+
| Performing     | Address | City/State/Zipcode | Phone Number |
| Organization   |         |                    |              |
+----------------+---------+--------------------+--------------+
|   EXTERNAL LAB |         |                    |              |
+----------------+---------+--------------------+--------------+
 External Lab: Creatinine (2016)
 
+-------------+-------+------------+------------+--------------+
| Component   | Value | Ref Range  | Performed  | Pathologist  |
|             |       |            | At         | Signature    |
+-------------+-------+------------+------------+--------------+
| Creatinine, | 1.2   | 0.7 - 1.25 | EXTERNAL   |              |
|  External   |       |            | LAB        |              |
+-------------+-------+------------+------------+--------------+
 
 
 
+-----------------+
| Specimen        |
+-----------------+
| Blood specimen  |
| (specimen)      |
+-----------------+
 
 
 
+--------------------------+
| Resulting Agency Comment |
+--------------------------+
|   [facility]              |
+--------------------------+
 
 
 
+----------------+---------+--------------------+--------------+
| Performing     | Address | City/State/Zipcode | Phone Number |
| Organization   |         |                    |              |
+----------------+---------+--------------------+--------------+
|   EXTERNAL LAB |         |                    |              |
+----------------+---------+--------------------+--------------+
 documented in this encounter
 
 
 Visit Diagnoses
 Not on filedocumented in this encounter

## 2020-01-08 NOTE — XMS
Encounter Summary
  Created on: 2020
 
 Ras Hardy
 External Reference #: [id number]
 : 47
 Sex: Female
 
 Demographics
 
 
+-----------------------+----------------------+
| Address               | [address]      |
|                       | [place]  [address] |
+-----------------------+----------------------+
| Home Phone            | [phone]      |
+-----------------------+----------------------+
| Preferred Language    | Unknown              |
+-----------------------+----------------------+
| Marital Status        | Single               |
+-----------------------+----------------------+
| Taoist Affiliation | [id number]                 |
+-----------------------+----------------------+
| Race                  | Unknown              |
+-----------------------+----------------------+
| Ethnic Group          | Unknown              |
+-----------------------+----------------------+
 
 
 Author
 
 
+--------------+--------------------------------------------+
| Author       | [facility]  |
|              | and [place]                                |
+--------------+--------------------------------------------+
| Organization | [facility]  |
|              | and [place]                                |
+--------------+--------------------------------------------+
| Address      | Unknown                                    |
+--------------+--------------------------------------------+
| Phone        | Unavailable                                |
+--------------+--------------------------------------------+
 
 
 
 Support
 
 
+----------------+--------------+---------------------+-----------------+
| Name           | Relationship | Address             | Phone           |
+----------------+--------------+---------------------+-----------------+
| Ada/Ed Radhames | ECON         | [place]              | [phone] |
|                |              | [place], [place]  |                 |
|                |              |  [address]              |                 |
+----------------+--------------+---------------------+-----------------+
 
 
 
 
 Care Team Providers
 
 
+-----------------------+------+-------------+
| Care Team Member Name | Role | Phone       |
+-----------------------+------+-------------+
 PCP  | Unavailable |
+-----------------------+------+-------------+
 
 
 
 Encounter Details
 
 
+--------+-------------+----------------------+----------------+----------------------+
| Date   | Type        | Department           | Care Team      | Description          |
+--------+-------------+----------------------+----------------+----------------------+
| / | Orders Only |   [facility] [place]          |   Offenstein,  | Shortness of breath  |
|    |             | PULMONARY  [address]     | Nena GUSMAN MD  | (Primary Dx); Cough  |
|        |             | [address]  [place], |                |                      |
|        |             |  [place] [address]       |                |                      |
|        |             | [phone]         |                |                      |
+--------+-------------+----------------------+----------------+----------------------+
 
 
 
 Social History
 
 
+--------------+-------+-----------+--------+------+
| Tobacco Use  | Types | Packs/Day | Years  | Date |
|              |       |           | Used   |      |
+--------------+-------+-----------+--------+------+
| Never Smoker |       |           |        |      |
+--------------+-------+-----------+--------+------+
 
 
 
+---------------------+---+---+---+
| Smokeless Tobacco:  |   |   |   |
| Never Used          |   |   |   |
+---------------------+---+---+---+
 
 
 
+---------------------------------------------------------------+
| Comments: some second hand smoke exposure, but fairly minimal |
+---------------------------------------------------------------+
 
 
 
+-------------+-------------+---------+----------+
| Alcohol Use | Drinks/Week | oz/Week | Comments |
+-------------+-------------+---------+----------+
| No          |             |         |          |
+-------------+-------------+---------+----------+
 
 
 
 
+------------------+---------------+
| Sex Assigned at  | Date Recorded |
| Birth            |               |
+------------------+---------------+
| Not on file      |               |
+------------------+---------------+
 
 
 
+----------------+-------------+-------------+
| Job Start Date | Occupation  | Industry    |
+----------------+-------------+-------------+
| Not on file    | Not on file | Not on file |
+----------------+-------------+-------------+
 
 
 
+----------------+--------------+------------+
| Travel History | Travel Start | Travel End |
+----------------+--------------+------------+
 
 
 
+-------------------------------------+
| No recent travel history available. |
+-------------------------------------+
 documented as of this encounter
 
 Plan of Treatment
 
 
+--------+-----------+------------+----------------------+-------------------+
| Date   | Type      | Specialty  | Care Team            | Description       |
+--------+-----------+------------+----------------------+-------------------+
| / | Office    | Cardiology |   Tonia Wilson,    |                   |
|    | Visit     |            | [address]   |                   |
|        |           |            | St [place] [facility], [place]  |                   |
|        |           |            | [id number] [phone]  |                   |
|        |           |            |  [phone] (Fax)  |                   |
+--------+-----------+------------+----------------------+-------------------+
| / | Hospital  | Radiology  |   Popeye Townsend, |                   |
|    | Encounter |            |  MD  [address] |                   |
|        |           |            |  St.  [place],   |                   |
|        |           |            | [place] [address]             |                   |
|        |           |            | [phone]         |                   |
|        |           |            | [phone] (Fax)   |                   |
+--------+-----------+------------+----------------------+-------------------+
| / | Surgery   | Radiology  |   Popeye Townsend, | CV EP PPM SYSTEM  |
|    |           |            |  MD  [address] | IMPLANT           |
|        |           |            |  St.  [place],   |                   |
|        |           |            | [place] [address]             |                   |
|        |           |            | [phone]         |                   |
|        |           |            | [phone] (Fax)   |                   |
+--------+-----------+------------+----------------------+-------------------+
| 02/10/ | Clinical  | Cardiology |                      |                   |
|    | Support   |            |                      |                   |
+--------+-----------+------------+----------------------+-------------------+
| / | Office    | Cardiology |   Tonia Wilson,    |                   |
|    | Visit     |            | ARNJESSICA  [address]   |                   |
|        |           |            | St  [place], [place]  |                   |
 
|        |           |            | [address]  [phone]  |                   |
|        |           |            |  [phone] (Fax)  |                   |
+--------+-----------+------------+----------------------+-------------------+
| / | Off-Site  | Nephrology |   Marzena Moser  |                   |
|    | Visit     |            | DO ETIENNE  [address]      |                   |
|        |           |            | [address]      |                   |
|        |           |            | [place]      |                   |
|        |           |            | [address]  [phone]  |                   |
|        |           |            |  [phone] (Fax)  |                   |
+--------+-----------+------------+----------------------+-------------------+
 documented as of this encounter
 
 Results
 XR Chest PA and Lateral (2013  1:17 PM PDT)
 
+----------+
| Specimen |
+----------+
|          |
+----------+
 
 
 
+------------------------------------------------------------------------+-----------------+
| Narrative                                                              | Performed At    |
+------------------------------------------------------------------------+-----------------+
|    [facility]      Diagnostic Imaging          |   [place]    |
| Department      [address] [place]      [phone]    | [facility]        |
|                             [   rep ct [address]]     [   rep ct [facility]  |
| st zip]     **** Signed ****                                           | - IMAGING       |
| ______________________________________________________________________ |                 |
| ______________________     Patient Name: RAS HARDY PITA   Physician:     |                 |
| OFFE.     : 1945    Age: 67   Sex: F     Unit #: [id number]    |                 |
|   Exam Date: 13     Acct #: [id number]   Location: [facility]         |                 |
| Report #: [id number]                      Page:                         |                 |
|   %(RAD)RES..mtdd.print.filter("pg") of   %(RAD)                       |                 |
| RES..mtdd.print.filter("tpg")                                          |                 |
| ______________________________________________________________________ |                 |
| ______________________     Accession Number: [id number]                |                 |
| CHEST X-RAY               CLINICAL HISTORY:   INCREASED SHORTNESS OF   |                 |
| BREATH AND COUGH.               COMPARISON:   Numerous previous chest  |                 |
| x-rays, most recently 2013.               FINDINGS:   PA and     |                 |
| lateral views of the chest were obtained. Sternotomy wires are         |                 |
| present. There is   mild scarring of the bilateral lung bases.         |                 |
| Bilateral lungs are otherwise clear. Heart is enlarged.   There is     |                 |
| atherosclerosis of the aorta. Moderate spondylosis is visualized of    |                 |
| the thoracic spine.               IMPRESSION:       1.   NO ACUTE      |                 |
| FINDINGS.               2.   CARDIOMEGALY.              3.   PREVIOUS  |                 |
| STERNOTOMY.              Dictated Date/Time:   2013 13:17        |                 |
| Transcribed Date/Time:   2013 13:19      :       |                 |
|                         <<Signature on File>>                     |                 |
| -----------------------------------------------------------     Derek  |                 |
| MD Derek13 [id number]     <Electronically signed by Derek Reed MD>     |                 |
|            Derek Reed MD 13 1317     : Rich  |                 |
| Smhmzzsqwvion88/17/13 1319               Nena Boykin MD      |                 |
|                                                                        |                 |
+------------------------------------------------------------------------+-----------------+
 
 
 
 
+----------------------+---------------------+--------------------+----------------+
| Performing           | Address             | City/State/Zipcode | Phone Number   |
| Organization         |                     |                    |                |
+----------------------+---------------------+--------------------+----------------+
|   [facility]     |   [address] | [place]    |   [phone] |
| [facility]  |                     | [address]              |                |
| - IMAGING            |                     |                    |                |
+----------------------+---------------------+--------------------+----------------+
 B Type Natriuretic Peptide (2013 10:01 AM PDT)
 
+-----------+--------------------------+------------+-------------+--------------+
| Component | Value                    | Ref Range  | Performed   | Pathologist  |
|           |                          |            | At          | Signature    |
+-----------+--------------------------+------------+-------------+--------------+
| BNP       | 164 (H)Comment: Testing  | <100 pg/mL | [facility]  |              |
|           | performed on the [facility] |            | [facility]    |              |
|           |  [facility]          |            | MEDICAL     |              |
|           | Analyzer.                |            | CENTER -    |              |
|           |                          |            | LABORATORY  |              |
+-----------+--------------------------+------------+-------------+--------------+
 
 
 
+-----------------+
| Specimen        |
+-----------------+
| Blood specimen  |
| (specimen)      |
+-----------------+
 
 
 
+----------------------+--------------------+--------------------+----------------+
| Performing           | Address            | City/State/Zipcode | Phone Number   |
| Organization         |                    |                    |                |
+----------------------+--------------------+--------------------+----------------+
|   [facility]     |   [address] | [place], [place]    |   [phone] |
| [facility]  |                    | [address]              |                |
| - LABORATORY         |                    |                    |                |
+----------------------+--------------------+--------------------+----------------+
|   [facility]     |   [address] | [place], [place]    |                |
| [facility]  |                    | [address]              |                |
| - LABORATORY         |                    |                    |                |
+----------------------+--------------------+--------------------+----------------+
 documented in this encounter
 
 Visit Diagnoses
 
 
+---------------------------------+
| Diagnosis                       |
+---------------------------------+
|   Shortness of breath - Primary |
+---------------------------------+
|   Cough                         |
+---------------------------------+
 documented in this encounter

## 2020-01-08 NOTE — XMS
Encounter Summary
  Created on: 2020
 
 Viviana Ricci
 External Reference #: [id number]
 : 47
 Sex: Female
 
 Demographics
 
 
+-----------------------+----------------------+
| Address               | [address]      |
|                       | [place]  [address] |
+-----------------------+----------------------+
| Home Phone            | [phone]      |
+-----------------------+----------------------+
| Preferred Language    | Unknown              |
+-----------------------+----------------------+
| Marital Status        | Single               |
+-----------------------+----------------------+
| Worship Affiliation | [id number]                 |
+-----------------------+----------------------+
| Race                  | Unknown              |
+-----------------------+----------------------+
| Ethnic Group          | Unknown              |
+-----------------------+----------------------+
 
 
 Author
 
 
+--------------+--------------------------------------------+
| Author       | [facility]  |
|              | and [place]                                |
+--------------+--------------------------------------------+
| Organization | [facility]  |
|              | and [place]                                |
+--------------+--------------------------------------------+
| Address      | Unknown                                    |
+--------------+--------------------------------------------+
| Phone        | Unavailable                                |
+--------------+--------------------------------------------+
 
 
 
 Support
 
 
+----------------+--------------+---------------------+-----------------+
| Name           | Relationship | Address             | Phone           |
+----------------+--------------+---------------------+-----------------+
| Ada/Ed Radhames | ECON         | [place]              | [phone] |
|                |              | [place]  |                 |
|                |              |  [address]              |                 |
+----------------+--------------+---------------------+-----------------+
 
 
 
 
 Care Team Providers
 
 
+-----------------------+------+-------------+
| Care Team Member Name | Role | Phone       |
+-----------------------+------+-------------+
 PCP  | Unavailable |
+-----------------------+------+-------------+
 
 
 
 Reason for Visit
 
 
+-------------------+----------+
| Reason            | Comments |
+-------------------+----------+
| Medication Refill |          |
+-------------------+----------+
 
 
 
 Encounter Details
 
 
+--------+--------+---------------------+----------------------+-------------------+
| Date   | Type   | Department          | Care Team            | Description       |
+--------+--------+---------------------+----------------------+-------------------+
| / | Refill |   [place]         |   Marzena Moser  | Medication Refill |
|    |        | NEPHROLOGY  [address]   | M, DO  [address]      |                   |
|        |        | [address]   | [address]      |                   |
|        |        | [place], [place]     | [place], [place]      |                   |
|        |        | [address]          | [address]  [phone]  |                   |
|        |        | [phone]        |  [phone] (Fax)  |                   |
+--------+--------+---------------------+----------------------+-------------------+
 
 
 
 Social History
 
 
+--------------+-------+-----------+--------+------+
| Tobacco Use  | Types | Packs/Day | Years  | Date |
|              |       |           | Used   |      |
+--------------+-------+-----------+--------+------+
| Never Smoker |       |           |        |      |
+--------------+-------+-----------+--------+------+
 
 
 
+---------------------+---+---+---+
| Smokeless Tobacco:  |   |   |   |
| Never Used          |   |   |   |
+---------------------+---+---+---+
 
 
 
+---------------------------------------------------------------+
| Comments: some second hand smoke exposure, but fairly minimal |
 
+---------------------------------------------------------------+
 
 
 
+-------------+-------------+---------+----------+
| Alcohol Use | Drinks/Week | oz/Week | Comments |
+-------------+-------------+---------+----------+
| No          |             |         |          |
+-------------+-------------+---------+----------+
 
 
 
+------------------+---------------+
| Sex Assigned at  | Date Recorded |
| Birth            |               |
+------------------+---------------+
| Not on file      |               |
+------------------+---------------+
 
 
 
+----------------+-------------+-------------+
| Job Start Date | Occupation  | Industry    |
+----------------+-------------+-------------+
| Not on file    | Not on file | Not on file |
+----------------+-------------+-------------+
 
 
 
+----------------+--------------+------------+
| Travel History | Travel Start | Travel End |
+----------------+--------------+------------+
 
 
 
+-------------------------------------+
| No recent travel history available. |
+-------------------------------------+
 documented as of this encounter
 
 Plan of Treatment
 
 
+--------+-----------+------------+----------------------+-------------------+
| Date   | Type      | Specialty  | Care Team            | Description       |
+--------+-----------+------------+----------------------+-------------------+
| / | Office    | Cardiology |   HellalfredoTonia,    |                   |
|    | Visit     |            | ARNP  [address]   |                   |
|        |           |            | St  [place], [place]  |                   |
|        |           |            | [address]  [phone]  |                   |
|        |           |            |  [phone] (Fax)  |                   |
+--------+-----------+------------+----------------------+-------------------+
| / | Hospital  | Radiology  |   Popeye Townsend, |                   |
|    | Encounter |            |  MD  [address] |                   |
|        |           |            |  St.  [place],   |                   |
|        |           |            | [place] [address]             |                   |
|        |           |            | [phone]         |                   |
|        |           |            | [phone] (Fax)   |                   |
+--------+-----------+------------+----------------------+-------------------+
| / | Surgery   | Radiology  |   Popeye Townsend, | CV EP PPM SYSTEM  |
 
|    |           |            |  MD  [address] | IMPLANT           |
|        |           |            |  St.  [place],   |                   |
|        |           |            | [place] [address]             |                   |
|        |           |            | [phone]         |                   |
|        |           |            | [phone] (Fax)   |                   |
+--------+-----------+------------+----------------------+-------------------+
| 02/10/ | Clinical  | Cardiology |                      |                   |
|    | Support   |            |                      |                   |
+--------+-----------+------------+----------------------+-------------------+
| / | Office    | Cardiology |   Tonia Wilson,    |                   |
|    | Visit     |            | ARNP  [address]   |                   |
|        |           |            | [place]  |                   |
|        |           |            | [address]  [phone]  |                   |
|        |           |            |  [phone] (Fax)  |                   |
+--------+-----------+------------+----------------------+-------------------+
| / | Off-Site  | Nephrology |   Marzean Moser  |                   |
|    | Visit     |            | DO ETIENNE  [address] [place] [address]      |                   |
|        |           |            | [address]      |                   |
|        |           |            | [place]      |                   |
|        |           |            | [address]  [phone]  |                   |
|        |           |            |  [phone] (Fax)  |                   |
+--------+-----------+------------+----------------------+-------------------+
 documented as of this encounter
 
 Visit Diagnoses
 Not on filedocumented in this encounter

## 2020-01-08 NOTE — XMS
Encounter Summary
  Created on: 2020
 
 Viviana Ricci
 External Reference #: [id number]
 : 47
 Sex: Female
 
 Demographics
 
 
+-----------------------+----------------------+
| Address               | [address]      |
|                       | [place]  [address] |
+-----------------------+----------------------+
| Home Phone            | [phone]      |
+-----------------------+----------------------+
| Preferred Language    | Unknown              |
+-----------------------+----------------------+
| Marital Status        | Single               |
+-----------------------+----------------------+
| Nondenominational Affiliation | [id number]                 |
+-----------------------+----------------------+
| Race                  | Unknown              |
+-----------------------+----------------------+
| Ethnic Group          | Unknown              |
+-----------------------+----------------------+
 
 
 Author
 
 
+--------------+--------------------------------------------+
| Author       | [facility]  |
|              | and [facility]                                |
+--------------+--------------------------------------------+
| Organization | [facility]  |
|              | and [facility]                                |
+--------------+--------------------------------------------+
| Address      | Unknown                                    |
+--------------+--------------------------------------------+
| Phone        | Unavailable                                |
+--------------+--------------------------------------------+
 
 
 
 Support
 
 
+----------------+--------------+---------------------+-----------------+
| Name           | Relationship | Address             | Phone           |
+----------------+--------------+---------------------+-----------------+
| Ada/Ed Radhames | ECON         | [address]              | [phone] |
|                |              | [place]  |                 |
|                |              |  [address]              |                 |
+----------------+--------------+---------------------+-----------------+
 
 
 
 
 Care Team Providers
 
 
+------------------------+------+-----------------+
| Care Team Member Name  | Role | Phone           |
+------------------------+------+-----------------+
| Marzena Moser DO | PCP  | [phone] |
+------------------------+------+-----------------+
 
 
 
 Reason for Visit
 
 
+-------------------+----------+
| Reason            | Comments |
+-------------------+----------+
| Medication Refill |          |
+-------------------+----------+
 
 
 
 Encounter Details
 
 
+--------+--------+---------------------+----------------------+-------------------+
| Date   | Type   | Department          | Care Team            | Description       |
+--------+--------+---------------------+----------------------+-------------------+
| / | Refill |   [facility]         |   Marzena Moser  | Medication Refill |
| 2019   |        | NEPHROLOGY  [address]   | M, DO  [address]      |                   |
|        |        | [address]   | [address]      |                   |
|        |        | [place], [place]     | [place], [place]      |                   |
|        |        | [address]          | [address]  [phone]  |                   |
|        |        | [phone]        |  [phone] (Fax)  |                   |
+--------+--------+---------------------+----------------------+-------------------+
 
 
 
 Social History
 
 
+--------------+-------+-----------+--------+------+
| Tobacco Use  | Types | Packs/Day | Years  | Date |
|              |       |           | Used   |      |
+--------------+-------+-----------+--------+------+
| Never Smoker |       |           |        |      |
+--------------+-------+-----------+--------+------+
 
 
 
+---------------------+---+---+---+
| Smokeless Tobacco:  |   |   |   |
| Never Used          |   |   |   |
+---------------------+---+---+---+
 
 
 
+---------------------------------------------------------------+
| Comments: some second hand smoke exposure, but fairly minimal |
 
+---------------------------------------------------------------+
 
 
 
+-------------+-------------+---------+----------+
| Alcohol Use | Drinks/Week | oz/Week | Comments |
+-------------+-------------+---------+----------+
| No          |             |         |          |
+-------------+-------------+---------+----------+
 
 
 
+------------------+---------------+
| Sex Assigned at  | Date Recorded |
| Birth            |               |
+------------------+---------------+
| Not on file      |               |
+------------------+---------------+
 
 
 
+----------------+-------------+-------------+
| Job Start Date | Occupation  | Industry    |
+----------------+-------------+-------------+
| Not on file    | Not on file | Not on file |
+----------------+-------------+-------------+
 
 
 
+----------------+--------------+------------+
| Travel History | Travel Start | Travel End |
+----------------+--------------+------------+
 
 
 
+-------------------------------------+
| No recent travel history available. |
+-------------------------------------+
 documented as of this encounter
 
 Plan of Treatment
 
 
+--------+-----------+------------+----------------------+-------------------+
| Date   | Type      | Specialty  | Care Team            | Description       |
+--------+-----------+------------+----------------------+-------------------+
| / | Office    | Cardiology |   Tonia Wilson,    |                   |
|    | Visit     |            | ARNP  [address]   |                   |
|        |           |            | [place], [place]  |                   |
|        |           |            | [address]  [phone]  |                   |
|        |           |            |  [phone] (Fax)  |                   |
+--------+-----------+------------+----------------------+-------------------+
| / | Hospital  | Radiology  |   Popeye Townsend, |                   |
|    | Encounter |            |  MD  [address] |                   |
|        |           |            |  [place],   |                   |
|        |           |            | [place] [address]             |                   |
|        |           |            | [phone]         |                   |
|        |           |            | [phone] (Fax)   |                   |
+--------+-----------+------------+----------------------+-------------------+
| / | Surgery   | Radiology  |   Popeye Townsend, | CV EP PPM SYSTEM  |
 
|    |           |            |  MD  [address] | IMPLANT           |
|        |           |            |  [place],   |                   |
|        |           |            | [place] [address]             |                   |
|        |           |            | [phone]         |                   |
|        |           |            | [phone] (Fax)   |                   |
+--------+-----------+------------+----------------------+-------------------+
| 02/10/ | Clinical  | Cardiology |                      |                   |
|    | Support   |            |                      |                   |
+--------+-----------+------------+----------------------+-------------------+
| / | Office    | Cardiology |   Tonia Wilson,    |                   |
|    | Visit     |            | ARNP  [address]   |                   |
|        |           |            | [place]  |                   |
|        |           |            | [id number] [phone]  |                   |
|        |           |            |  [phone] (Fax)  |                   |
+--------+-----------+------------+----------------------+-------------------+
| / | Off-Site  | Nephrology |   Marzena Moser  |                   |
|    | Visit     |            | DO ETIENNE  [address] [place] [address]      |                   |
|        |           |            | [address]      |                   |
|        |           |            | [place]      |                   |
|        |           |            | [address] [phone]  |                   |
|        |           |            |  [phone] (Fax)  |                   |
+--------+-----------+------------+----------------------+-------------------+
 documented as of this encounter
 
 Visit Diagnoses
 
 
+-------------------------------------------+
| Diagnosis                                 |
+-------------------------------------------+
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
+-------------------------------------------+
 documented in this encounter

## 2020-01-08 NOTE — XMS
Encounter Summary
  Created on: 2020
 
 Viviana Ricci
 External Reference #: [id number]
 : 47
 Sex: Female
 
 Demographics
 
 
+-----------------------+----------------------+
| Address               | [address]      |
|                       | [place]  [address] |
+-----------------------+----------------------+
| Home Phone            | [phone]      |
+-----------------------+----------------------+
| Preferred Language    | Unknown              |
+-----------------------+----------------------+
| Marital Status        | Single               |
+-----------------------+----------------------+
| Temple Affiliation | 1009                 |
+-----------------------+----------------------+
| Race                  | Unknown              |
+-----------------------+----------------------+
| Ethnic Group          | Unknown              |
+-----------------------+----------------------+
 
 
 Author
 
 
+--------------+--------------------------------------------+
| Author       | [facility]  |
|              | and [place]                                |
+--------------+--------------------------------------------+
| Organization | [facility]  |
|              | and [facility]                                |
+--------------+--------------------------------------------+
| Address      | Unknown                                    |
+--------------+--------------------------------------------+
| Phone        | Unavailable                                |
+--------------+--------------------------------------------+
 
 
 
 Support
 
 
+----------------+--------------+---------------------+-----------------+
| Name           | Relationship | Address             | Phone           |
+----------------+--------------+---------------------+-----------------+
| Ada/Ed Radhames | ECON         | [address]              | [phone] |
|                |              | [place]  |                 |
|                |              |  [address]              |                 |
+----------------+--------------+---------------------+-----------------+
 
 
 
 
 Care Team Providers
 
 
+-----------------------+------+-------------+
| Care Team Member Name | Role | Phone       |
+-----------------------+------+-------------+
 PCP  | Unavailable |
+-----------------------+------+-------------+
 
 
 
 Encounter Details
 
 
+--------+-------------+---------------------+----------------------+-------------+
| Date   | Type        | Department          | Care Team            | Description |
+--------+-------------+---------------------+----------------------+-------------+
| / | Documentati |   [facility]         |   Marzena Moser  |             |
| 2018   | on          | NEPHROLOGY  [address]   | M, DO  [address]      |             |
|        |             | [address]   | [address]      |             |
|        |             | [place]     | [place]      |             |
|        |             | [address]          | [address]  [phone]  |             |
|        |             | [phone]        |  [phone] (Fax)  |             |
+--------+-------------+---------------------+----------------------+-------------+
 
 
 
 Social History
 
 
+--------------+-------+-----------+--------+------+
| Tobacco Use  | Types | Packs/Day | Years  | Date |
|              |       |           | Used   |      |
+--------------+-------+-----------+--------+------+
| Never Smoker |       |           |        |      |
+--------------+-------+-----------+--------+------+
 
 
 
+---------------------+---+---+---+
| Smokeless Tobacco:  |   |   |   |
| Never Used          |   |   |   |
+---------------------+---+---+---+
 
 
 
+---------------------------------------------------------------+
| Comments: some second hand smoke exposure, but fairly minimal |
+---------------------------------------------------------------+
 
 
 
+-------------+-------------+---------+----------+
| Alcohol Use | Drinks/Week | oz/Week | Comments |
+-------------+-------------+---------+----------+
| No          |             |         |          |
+-------------+-------------+---------+----------+
 
 
 
 
+------------------+---------------+
| Sex Assigned at  | Date Recorded |
| Birth            |               |
+------------------+---------------+
| Not on file      |               |
+------------------+---------------+
 
 
 
+----------------+-------------+-------------+
| Job Start Date | Occupation  | Industry    |
+----------------+-------------+-------------+
| Not on file    | Not on file | Not on file |
+----------------+-------------+-------------+
 
 
 
+----------------+--------------+------------+
| Travel History | Travel Start | Travel End |
+----------------+--------------+------------+
 
 
 
+-------------------------------------+
| No recent travel history available. |
+-------------------------------------+
 documented as of this encounter
 
 Progress Notes
 Maite Raygoza - 2018  3:08 PM PDTManually faxed progress note from 18 to In [facility] ph:  [phone], fx:  [phone].Electronically signed by Maite Raygoza at 
2018  3:10 PM PDTdocumented in this encounter
 
 Plan of Treatment
 
 
+--------+-----------+------------+----------------------+-------------------+
| Date   | Type      | Specialty  | Care Team            | Description       |
+--------+-----------+------------+----------------------+-------------------+
| / | Office    | Cardiology |   Tonia Wilson,    |                   |
|    | Visit     |            | ARNJESSICA  [address]   |                   |
|        |           |            | [place]  |                   |
|        |           |            | [address]  [phone]  |                   |
|        |           |            |  [phone] (Fax)  |                   |
+--------+-----------+------------+----------------------+-------------------+
| / | Hospital  | Radiology  |   Popeye Townsend, |                   |
|    | Encounter |            |  MD  [address] |                   |
|        |           |            |  [place],   |                   |
|        |           |            | [place] [address]             |                   |
|        |           |            | [phone]         |                   |
|        |           |            | [phone] (Fax)   |                   |
+--------+-----------+------------+----------------------+-------------------+
| / | Surgery   | Radiology  |   Popeye Townsend, | CV EP PPM SYSTEM  |
|    |           |            |  MD  [address] | IMPLANT           |
|        |           |            |  [place],   |                   |
|        |           |            | [place] [address]             |                   |
|        |           |            | [phone]         |                   |
|        |           |            | [phone] (Fax)   |                   |
+--------+-----------+------------+----------------------+-------------------+
 
| 02/10/ | Clinical  | Cardiology |                      |                   |
|    | Support   |            |                      |                   |
+--------+-----------+------------+----------------------+-------------------+
| / | Office    | Cardiology |   Tonia Wilson,    |                   |
|    | Visit     |            | BELKIS  [address]   |                   |
|        |           |            | [place]  |                   |
|        |           |            | [address]  [phone]  |                   |
|        |           |            |  [phone] (Fax)  |                   |
+--------+-----------+------------+----------------------+-------------------+
| / | Off-Site  | Nephrology |   Marzena Moser  |                   |
|    | Visit     |            | M, DO  [address]      |                   |
|        |           |            | [address]      |                   |
|        |           |            | [place]      |                   |
|        |           |            | [address]  [phone]  |                   |
|        |           |            |  [phone] (Fax)  |                   |
+--------+-----------+------------+----------------------+-------------------+
 documented as of this encounter
 
 Visit Diagnoses
 Not on filedocumented in this encounter

## 2020-01-08 NOTE — XMS
Encounter Summary
  Created on: 2020
 
 Viviana Ricci
 External Reference #: [id number]
 : 47
 Sex: Female
 
 Demographics
 
 
+-----------------------+----------------------+
| Address               | [address]      |
|                       | [place]  [address] |
+-----------------------+----------------------+
| Home Phone            | [phone]      |
+-----------------------+----------------------+
| Preferred Language    | Unknown              |
+-----------------------+----------------------+
| Marital Status        | Single               |
+-----------------------+----------------------+
| Protestant Affiliation | [id number]                 |
+-----------------------+----------------------+
| Race                  | Unknown              |
+-----------------------+----------------------+
| Ethnic Group          | Unknown              |
+-----------------------+----------------------+
 
 
 Author
 
 
+--------------+--------------------------------------------+
| Author       | [facility]  |
|              | and [facility]                                |
+--------------+--------------------------------------------+
| Organization | [facility]  |
|              | and [place]                                |
+--------------+--------------------------------------------+
| Address      | Unknown                                    |
+--------------+--------------------------------------------+
| Phone        | Unavailable                                |
+--------------+--------------------------------------------+
 
 
 
 Support
 
 
+----------------+--------------+---------------------+-----------------+
| Name           | Relationship | Address             | Phone           |
+----------------+--------------+---------------------+-----------------+
| Ada/Ed Radhames | ECON         | [address]              | [phone] |
|                |              | [place]  |                 |
|                |              |  [id number]              |                 |
+----------------+--------------+---------------------+-----------------+
 
 
 
 
 Care Team Providers
 
 
+-----------------------+------+-------------+
| Care Team Member Name | Role | Phone       |
+-----------------------+------+-------------+
 PCP  | Unavailable |
+-----------------------+------+-------------+
 
 
 
 Encounter Details
 
 
+--------+----------+---------------------+----------------------+-------------+
| Date   | Type     | Department          | Care Team            | Description |
+--------+----------+---------------------+----------------------+-------------+
| / | Abstract |   [facility] [place]         |   Marzena Moser  |             |
|    |          | NEPHROLOGY  [address]   | M, DO  [address]      |             |
|        |          | [address]   | [address]      |             |
|        |          | [place], [place]     | [place]      |             |
|        |          | [address]          | [address]  [phone]  |             |
|        |          | [phone]        |  [phone] (Fax)  |             |
+--------+----------+---------------------+----------------------+-------------+
 
 
 
 Social History
 
 
+--------------+-------+-----------+--------+------+
| Tobacco Use  | Types | Packs/Day | Years  | Date |
|              |       |           | Used   |      |
+--------------+-------+-----------+--------+------+
| Never Smoker |       |           |        |      |
+--------------+-------+-----------+--------+------+
 
 
 
+---------------------+---+---+---+
| Smokeless Tobacco:  |   |   |   |
| Never Used          |   |   |   |
+---------------------+---+---+---+
 
 
 
+---------------------------------------------------------------+
| Comments: some second hand smoke exposure, but fairly minimal |
+---------------------------------------------------------------+
 
 
 
+-------------+-------------+---------+----------+
| Alcohol Use | Drinks/Week | oz/Week | Comments |
+-------------+-------------+---------+----------+
| No          |             |         |          |
+-------------+-------------+---------+----------+
 
 
 
 
+------------------+---------------+
| Sex Assigned at  | Date Recorded |
| Birth            |               |
+------------------+---------------+
| Not on file      |               |
+------------------+---------------+
 
 
 
+----------------+-------------+-------------+
| Job Start Date | Occupation  | Industry    |
+----------------+-------------+-------------+
| Not on file    | Not on file | Not on file |
+----------------+-------------+-------------+
 
 
 
+----------------+--------------+------------+
| Travel History | Travel Start | Travel End |
+----------------+--------------+------------+
 
 
 
+-------------------------------------+
| No recent travel history available. |
+-------------------------------------+
 documented as of this encounter
 
 Progress Notes
 Juju Glass RN - 2018  9:23 AM PDTUrinalysis shows WBC, leukocytes and bacteria 
present. 
 
 Patient denies symptoms of a UTI. Urine was sent to culture.
 
 Instructed to go to a walk in clinic if symptoms develop over the weekend. She verbalized u
nderstanding and will see Dr. Moser in clinic on Monday.Electronically signed by Juju Glass RN at 2018  9:55 AM PDTdocumented in this encounter
 
 Plan of Treatment
 
 
+--------+-----------+------------+----------------------+-------------------+
| Date   | Type      | Specialty  | Care Team            | Description       |
+--------+-----------+------------+----------------------+-------------------+
| / | Office    | Cardiology |   Tonia Wilson,    |                   |
|    | Visit     |            | [address]   |                   |
|        |           |            | St  [place], [place]  |                   |
|        |           |            | [address]  [phone]  |                   |
|        |           |            |  [phone] (Fax)  |                   |
+--------+-----------+------------+----------------------+-------------------+
| / | Hospital  | Radiology  |   Popeye Townsend, |                   |
|    | Encounter |            |  MD  [address] |                   |
|        |           |            |  [place],   |                   |
|        |           |            | [place] [address]             |                   |
|        |           |            | [phone]         |                   |
|        |           |            | [phone] (Fax)   |                   |
+--------+-----------+------------+----------------------+-------------------+
| / | Surgery   | Radiology  |   Popeye Townsend, | CV EP PPM SYSTEM  |
|    |           |            |  MD  [address] | IMPLANT           |
 
|        |           |            |  St.  [place],   |                   |
|        |           |            | [place] [address]             |                   |
|        |           |            | [phone]         |                   |
|        |           |            | [phone] (Fax)   |                   |
+--------+-----------+------------+----------------------+-------------------+
| 02/10/ | Clinical  | Cardiology |                      |                   |
|    | Support   |            |                      |                   |
+--------+-----------+------------+----------------------+-------------------+
| / | Office    | Cardiology |   Tonia Wilson,    |                   |
|    | Visit     |            | [facility]  [address]   |                   |
|        |           |            | [place]  |                   |
|        |           |            | [address]  [phone]  |                   |
|        |           |            |  [phone] (Fax)  |                   |
+--------+-----------+------------+----------------------+-------------------+
| / | Off-Site  | Nephrology |   Marzena Moser  |                   |
|    | Visit     |            | DO [facility]  [address] [place] [address]      |                   |
|        |           |            | [address]      |                   |
|        |           |            | [place]      |                   |
|        |           |            | [address]  [phone]  |                   |
|        |           |            |  [phone] (Fax)  |                   |
+--------+-----------+------------+----------------------+-------------------+
 documented as of this encounter
 
 Procedures
 
 
+----------------------+--------+------------+----------------------+----------------------+
| Procedure Name       | Priori | Date/Time  | Associated Diagnosis | Comments             |
|                      | ty     |            |                      |                      |
+----------------------+--------+------------+----------------------+----------------------+
| EXTERNAL LAB: BUN    | Routin | 2018 |                      |   Results for this   |
|                      | e      |            |                      | procedure are in the |
|                      |        |            |                      |  results section.    |
+----------------------+--------+------------+----------------------+----------------------+
| EXTERNAL LAB:        | Routin | 2018 |                      |   Results for this   |
| GLUCOSE              | e      |            |                      | procedure are in the |
|                      |        |            |                      |  results section.    |
+----------------------+--------+------------+----------------------+----------------------+
| EXTERNAL LAB: ALT    | Routin | 2018 |                      |   Results for this   |
|                      | e      |            |                      | procedure are in the |
|                      |        |            |                      |  results section.    |
+----------------------+--------+------------+----------------------+----------------------+
| EXTERNAL LAB: AST    | Routin | 2018 |                      |   Results for this   |
|                      | e      |            |                      | procedure are in the |
|                      |        |            |                      |  results section.    |
+----------------------+--------+------------+----------------------+----------------------+
| EXTERNAL LAB:        | Routin | 2018 |                      |   Results for this   |
| ALKALINE PHOSPHATASE | e      |            |                      | procedure are in the |
|                      |        |            |                      |  results section.    |
+----------------------+--------+------------+----------------------+----------------------+
| EXTERNAL LAB:        | Routin | 2018 |                      |   Results for this   |
| BILIRUBIN, TOTAL     | e      |            |                      | procedure are in the |
|                      |        |            |                      |  results section.    |
+----------------------+--------+------------+----------------------+----------------------+
| EXTERNAL LAB:        | Routin | 2018 |                      |   Results for this   |
| ALBUMIN              | e      |            |                      | procedure are in the |
|                      |        |            |                      |  results section.    |
+----------------------+--------+------------+----------------------+----------------------+
| EXTERNAL LAB:        | Routin | 2018 |                      |   Results for this   |
| PROTEIN, TOTAL       | e      |            |                      | procedure are in the |
 
|                      |        |            |                      |  results section.    |
+----------------------+--------+------------+----------------------+----------------------+
| EXTERNAL LAB:        | Routin | 2018 |                      |   Results for this   |
| PHOSPHORUS           | e      |            |                      | procedure are in the |
|                      |        |            |                      |  results section.    |
+----------------------+--------+------------+----------------------+----------------------+
| EXTERNAL LAB:        | Routin | 2018 |                      |   Results for this   |
| MAGNESIUM            | e      |            |                      | procedure are in the |
|                      |        |            |                      |  results section.    |
+----------------------+--------+------------+----------------------+----------------------+
| EXTERNAL LAB:        | Routin | 2018 |                      |   Results for this   |
| CALCIUM              | e      |            |                      | procedure are in the |
|                      |        |            |                      |  results section.    |
+----------------------+--------+------------+----------------------+----------------------+
| EXTERNAL LAB: CARBON | Routin | 2018 |                      |   Results for this   |
|  DIOXIDE             | e      |            |                      | procedure are in the |
|                      |        |            |                      |  results section.    |
+----------------------+--------+------------+----------------------+----------------------+
| EXTERNAL LAB:        | Routin | 2018 |                      |   Results for this   |
| CHLORIDE             | e      |            |                      | procedure are in the |
|                      |        |            |                      |  results section.    |
+----------------------+--------+------------+----------------------+----------------------+
| EXTERNAL LAB:        | Routin | 2018 |                      |   Results for this   |
| POTASSIUM            | e      |            |                      | procedure are in the |
|                      |        |            |                      |  results section.    |
+----------------------+--------+------------+----------------------+----------------------+
| EXTERNAL LAB: SODIUM | Routin | 2018 |                      |   Results for this   |
|                      | e      |            |                      | procedure are in the |
|                      |        |            |                      |  results section.    |
+----------------------+--------+------------+----------------------+----------------------+
| EXTERNAL LAB:        | Routin | 2018 |                      |   Results for this   |
| URINALYSIS           | e      |            |                      | procedure are in the |
|                      |        |            |                      |  results section.    |
+----------------------+--------+------------+----------------------+----------------------+
| EXTERNAL LAB:        | Routin | 2018 |                      |   Results for this   |
| PROTEIN/CREATININE   | e      |            |                      | procedure are in the |
| RATIO                |        |            |                      |  results section.    |
+----------------------+--------+------------+----------------------+----------------------+
| EXTERNAL LAB: CBC    | Routin | 2018 |                      |   Results for this   |
|                      | e      |            |                      | procedure are in the |
|                      |        |            |                      |  results section.    |
+----------------------+--------+------------+----------------------+----------------------+
| EXTERNAL LAB:        | Routin | 2018 |                      |   Results for this   |
| TRIGLYCERIDES        | e      |            |                      | procedure are in the |
|                      |        |            |                      |  results section.    |
+----------------------+--------+------------+----------------------+----------------------+
| EXTERNAL LAB:        | Routin | 2018 |                      |   Results for this   |
| CHOLESTEROL, HDL     | e      |            |                      | procedure are in the |
|                      |        |            |                      |  results section.    |
+----------------------+--------+------------+----------------------+----------------------+
| EXTERNAL LAB:        | Routin | 2018 |                      |   Results for this   |
| CHOLESTEROL, TOTAL   | e      |            |                      | procedure are in the |
|                      |        |            |                      |  results section.    |
+----------------------+--------+------------+----------------------+----------------------+
| EXTERNAL LAB:        | Routin | 2018 |                      |   Results for this   |
| CHOLESTEROL, LDL     | e      |            |                      | procedure are in the |
|                      |        |            |                      |  results section.    |
+----------------------+--------+------------+----------------------+----------------------+
| EXTERNAL LAB: EGFR   | Routin | 2018 |                      |   Results for this   |
|                      | e      |            |                      | procedure are in the |
 
|                      |        |            |                      |  results section.    |
+----------------------+--------+------------+----------------------+----------------------+
| EXTERNAL LAB:        | Routin | 2018 |                      |   Results for this   |
| CREATININE           | e      |            |                      | procedure are in the |
|                      |        |            |                      |  results section.    |
+----------------------+--------+------------+----------------------+----------------------+
| URINALYSIS           | Routin | 2018 |                      |   Results for this   |
|                      | e      |            |                      | procedure are in the |
|                      |        |            |                      |  results section.    |
+----------------------+--------+------------+----------------------+----------------------+
| HEMOGLOBIN A1C       | Routin | 2018 |                      |   Results for this   |
|                      | e      |            |                      | procedure are in the |
|                      |        |            |                      |  results section.    |
+----------------------+--------+------------+----------------------+----------------------+
 documented in this encounter
 
 Results
 External Lab: Protein/Creatinine Ratio (2018)
 
+-------------+---------+-----------------+------------+--------------+
| Component   | Value   | Ref Range       | Performed  | Pathologist  |
|             |         |                 | At         | Signature    |
+-------------+---------+-----------------+------------+--------------+
| Protein/Cre | 0.7 (A) | 0.0 - 0.2 mg/mg |            |              |
| atinine     |         |                 |            |              |
| Ratio,      |         |                 |            |              |
| External    |         |                 |            |              |
+-------------+---------+-----------------+------------+--------------+
 
 
 
+----------+
| Specimen |
+----------+
|          |
+----------+
 Urinalysis (2018)
 
+-------------+--------+----------------+------------+--------------+
| Component   | Value  | Ref Range      | Performed  | Pathologist  |
|             |        |                | At         | Signature    |
+-------------+--------+----------------+------------+--------------+
| WBC UA      | 50     | /HPF           |            |              |
+-------------+--------+----------------+------------+--------------+
| BACTERIA UA | 4+ (A) | Negative /HPF  |            |              |
+-------------+--------+----------------+------------+--------------+
| Color,      | Yellow | Light Yellow,  |            |              |
| Urine       |        | Yellow         |            |              |
+-------------+--------+----------------+------------+--------------+
| Clarity     | Clear  |                |            |              |
+-------------+--------+----------------+------------+--------------+
 
 
 
+----------+
| Specimen |
+----------+
| Urine    |
+----------+
 External Lab: Urinalysis (2018)
 
 
+-------------+----------+-----------+------------+--------------+
| Component   | Value    | Ref Range | Performed  | Pathologist  |
|             |          |           | At         | Signature    |
+-------------+----------+-----------+------------+--------------+
| UA Blood,   | negative |           | EXTERNAL   |              |
| External    |          |           | LAB        |              |
+-------------+----------+-----------+------------+--------------+
| UA Glucose, | normal   |           | EXTERNAL   |              |
|  External   |          |           | LAB        |              |
+-------------+----------+-----------+------------+--------------+
| UA Ketones, | negative |           | EXTERNAL   |              |
|  External   |          |           | LAB        |              |
+-------------+----------+-----------+------------+--------------+
| UA Ph,      | 5        |           | EXTERNAL   |              |
| External    |          |           | LAB        |              |
+-------------+----------+-----------+------------+--------------+
| UA          | negative |           | EXTERNAL   |              |
| Proteins,   |          |           | LAB        |              |
| External    |          |           |            |              |
+-------------+----------+-----------+------------+--------------+
| UA RBC,     | 0        |           | EXTERNAL   |              |
| External    |          |           | LAB        |              |
+-------------+----------+-----------+------------+--------------+
| UA Specific | 1.010    |           | EXTERNAL   |              |
|  Gravity,   |          |           | LAB        |              |
| External    |          |           |            |              |
+-------------+----------+-----------+------------+--------------+
| UA          | 500      |           | EXTERNAL   |              |
| Leukocyte   |          |           | LAB        |              |
| Esterase,   |          |           |            |              |
| External    |          |           |            |              |
+-------------+----------+-----------+------------+--------------+
 
 
 
+--------------------------+
| Resulting Agency Comment |
+--------------------------+
|   [facility]              |
+--------------------------+
 
 
 
+----------------+---------+--------------------+--------------+
| Performing     | Address | City/State/Zipcode | Phone Number |
| Organization   |         |                    |              |
+----------------+---------+--------------------+--------------+
|   EXTERNAL LAB |         |                    |              |
+----------------+---------+--------------------+--------------+
 External Lab: CBC (2018)
 
+-----------+----------+-----------+------------+--------------+
| Component | Value    | Ref Range | Performed  | Pathologist  |
|           |          |           | At         | Signature    |
+-----------+----------+-----------+------------+--------------+
| WBC,      | 7.1      | 4.5 - 11  | EXTERNAL   |              |
| External  |          |           | LAB        |              |
+-----------+----------+-----------+------------+--------------+
| HGB,      | 14.7     | 12 - 16   | EXTERNAL   |              |
 
| External  |          |           | LAB        |              |
+-----------+----------+-----------+------------+--------------+
| HCT,      | 45.2 (A) | 35 - 45   | EXTERNAL   |              |
| External  |          |           | LAB        |              |
+-----------+----------+-----------+------------+--------------+
| PLT,      | 135 (A)  | 140 - 440 | EXTERNAL   |              |
| External  |          |           | LAB        |              |
+-----------+----------+-----------+------------+--------------+
| RBC,      | 4.42     | 3.8 - 5.1 | EXTERNAL   |              |
| External  |          |           | LAB        |              |
+-----------+----------+-----------+------------+--------------+
| MCV,      | 102 (A)  | 81 - 99   | EXTERNAL   |              |
| External  |          |           | LAB        |              |
+-----------+----------+-----------+------------+--------------+
| RDW,      | 14.1     | 10.5 - 15 | EXTERNAL   |              |
| External  |          |           | LAB        |              |
+-----------+----------+-----------+------------+--------------+
 
 
 
+--------------------------+
| Resulting Agency Comment |
+--------------------------+
|   [facility]              |
+--------------------------+
 
 
 
+----------------+---------+--------------------+--------------+
| Performing     | Address | City/State/Zipcode | Phone Number |
| Organization   |         |                    |              |
+----------------+---------+--------------------+--------------+
|   EXTERNAL LAB |         |                    |              |
+----------------+---------+--------------------+--------------+
 Hemoglobin A1C (2018)
 
+-------------+-------+-----------+------------+--------------+
| Component   | Value | Ref Range | Performed  | Pathologist  |
|             |       |           | At         | Signature    |
+-------------+-------+-----------+------------+--------------+
| Hemoglobin  | 7.1   | %         | EXTERNAL   |              |
| A1c         |       |           | LAB        |              |
+-------------+-------+-----------+------------+--------------+
 
 
 
+----------+
| Specimen |
+----------+
| Blood    |
+----------+
 
 
 
+--------------------------+
| Resulting Agency Comment |
+--------------------------+
|   [facility]              |
+--------------------------+
 
 
 
 
+----------------+---------+--------------------+--------------+
| Performing     | Address | City/State/Zipcode | Phone Number |
| Organization   |         |                    |              |
+----------------+---------+--------------------+--------------+
|   EXTERNAL LAB |         |                    |              |
+----------------+---------+--------------------+--------------+
 External Lab: ALMA (2018)
 
+-----------+--------+-----------+------------+--------------+
| Component | Value  | Ref Range | Performed  | Pathologist  |
|           |        |           | At         | Signature    |
+-----------+--------+-----------+------------+--------------+
| BUN,      | 48 (A) | 6 - 23    | EXTERNAL   |              |
| External  |        |           | LAB        |              |
+-----------+--------+-----------+------------+--------------+
 
 
 
+--------------------------+
| Resulting Agency Comment |
+--------------------------+
|   [facility]              |
+--------------------------+
 
 
 
+----------------+---------+--------------------+--------------+
| Performing     | Address | City/State/Zipcode | Phone Number |
| Organization   |         |                    |              |
+----------------+---------+--------------------+--------------+
|   EXTERNAL LAB |         |                    |              |
+----------------+---------+--------------------+--------------+
 External Lab: Glucose (2018)
 
+-----------+-------+-----------+------------+--------------+
| Component | Value | Ref Range | Performed  | Pathologist  |
|           |       |           | At         | Signature    |
+-----------+-------+-----------+------------+--------------+
| Glucose,  | 87    | 70 - 100  | EXTERNAL   |              |
| External  |       |           | LAB        |              |
+-----------+-------+-----------+------------+--------------+
 
 
 
+--------------------------+
| Resulting Agency Comment |
+--------------------------+
|   [facility]              |
+--------------------------+
 
 
 
+----------------+---------+--------------------+--------------+
| Performing     | Address | City/State/Zipcode | Phone Number |
| Organization   |         |                    |              |
+----------------+---------+--------------------+--------------+
|   EXTERNAL LAB |         |                    |              |
+----------------+---------+--------------------+--------------+
 
 External Lab: ALT (2018)
 
+-----------+-------+-----------+------------+--------------+
| Component | Value | Ref Range | Performed  | Pathologist  |
|           |       |           | At         | Signature    |
+-----------+-------+-----------+------------+--------------+
| ALT,      | 16    | 7 - 52    | EXTERNAL   |              |
| External  |       |           | LAB        |              |
+-----------+-------+-----------+------------+--------------+
 
 
 
+--------------------------+
| Resulting Agency Comment |
+--------------------------+
|   [facility]              |
+--------------------------+
 
 
 
+----------------+---------+--------------------+--------------+
| Performing     | Address | City/State/Zipcode | Phone Number |
| Organization   |         |                    |              |
+----------------+---------+--------------------+--------------+
|   EXTERNAL LAB |         |                    |              |
+----------------+---------+--------------------+--------------+
 External Lab: AST (2018)
 
+-----------+-------+-----------+------------+--------------+
| Component | Value | Ref Range | Performed  | Pathologist  |
|           |       |           | At         | Signature    |
+-----------+-------+-----------+------------+--------------+
| AST,      | 24    | 13 - 39   | EXTERNAL   |              |
| External  |       |           | LAB        |              |
+-----------+-------+-----------+------------+--------------+
 
 
 
+--------------------------+
| Resulting Agency Comment |
+--------------------------+
|   [facility]              |
+--------------------------+
 
 
 
+----------------+---------+--------------------+--------------+
| Performing     | Address | City/State/Zipcode | Phone Number |
| Organization   |         |                    |              |
+----------------+---------+--------------------+--------------+
|   EXTERNAL LAB |         |                    |              |
+----------------+---------+--------------------+--------------+
 External Lab: Alkaline Phosphatase (2018)
 
+-----------+-------+-----------+------------+--------------+
| Component | Value | Ref Range | Performed  | Pathologist  |
|           |       |           | At         | Signature    |
+-----------+-------+-----------+------------+--------------+
| ALP,      | 106   | 31 - 130  | EXTERNAL   |              |
| External  |       |           | LAB        |              |
 
+-----------+-------+-----------+------------+--------------+
 
 
 
+--------------------------+
| Resulting Agency Comment |
+--------------------------+
|   [facility]              |
+--------------------------+
 
 
 
+----------------+---------+--------------------+--------------+
| Performing     | Address | City/State/Zipcode | Phone Number |
| Organization   |         |                    |              |
+----------------+---------+--------------------+--------------+
|   EXTERNAL LAB |         |                    |              |
+----------------+---------+--------------------+--------------+
 External Lab: Bilirubin, Total (2018)
 
+-------------+-------+-----------+------------+--------------+
| Component   | Value | Ref Range | Performed  | Pathologist  |
|             |       |           | At         | Signature    |
+-------------+-------+-----------+------------+--------------+
| Bilirubin,  | 0.6   | 0 - 1.2   | EXTERNAL   |              |
| Total,      |       |           | LAB        |              |
| External    |       |           |            |              |
+-------------+-------+-----------+------------+--------------+
 
 
 
+--------------------------+
| Resulting Agency Comment |
+--------------------------+
|   [facility]              |
+--------------------------+
 
 
 
+----------------+---------+--------------------+--------------+
| Performing     | Address | City/State/Zipcode | Phone Number |
| Organization   |         |                    |              |
+----------------+---------+--------------------+--------------+
|   EXTERNAL LAB |         |                    |              |
+----------------+---------+--------------------+--------------+
 External Lab: Albumin (2018)
 
+-----------+-------+-----------+------------+--------------+
| Component | Value | Ref Range | Performed  | Pathologist  |
|           |       |           | At         | Signature    |
+-----------+-------+-----------+------------+--------------+
| Albumin,  | 3.9   | 3.5 - 5   | EXTERNAL   |              |
| External  |       |           | LAB        |              |
+-----------+-------+-----------+------------+--------------+
 
 
 
+--------------------------+
| Resulting Agency Comment |
+--------------------------+
 
|   [facility]              |
+--------------------------+
 
 
 
+----------------+---------+--------------------+--------------+
| Performing     | Address | City/State/Zipcode | Phone Number |
| Organization   |         |                    |              |
+----------------+---------+--------------------+--------------+
|   EXTERNAL LAB |         |                    |              |
+----------------+---------+--------------------+--------------+
 External Lab: Protein, Total (2018)
 
+-----------+-------+-----------+------------+--------------+
| Component | Value | Ref Range | Performed  | Pathologist  |
|           |       |           | At         | Signature    |
+-----------+-------+-----------+------------+--------------+
| Protein,  | 6.4   | 6 - 8     | EXTERNAL   |              |
| Total,    |       |           | LAB        |              |
| External  |       |           |            |              |
+-----------+-------+-----------+------------+--------------+
 
 
 
+--------------------------+
| Resulting Agency Comment |
+--------------------------+
|   [facility]              |
+--------------------------+
 
 
 
+----------------+---------+--------------------+--------------+
| Performing     | Address | City/State/Zipcode | Phone Number |
| Organization   |         |                    |              |
+----------------+---------+--------------------+--------------+
|   EXTERNAL LAB |         |                    |              |
+----------------+---------+--------------------+--------------+
 External Lab: Phosphorus (2018)
 
+-------------+-------+-----------+------------+--------------+
| Component   | Value | Ref Range | Performed  | Pathologist  |
|             |       |           | At         | Signature    |
+-------------+-------+-----------+------------+--------------+
| Phosphorus, | 3.3   | 2.5 - 5   | EXTERNAL   |              |
|  External   |       |           | LAB        |              |
+-------------+-------+-----------+------------+--------------+
 
 
 
+--------------------------+
| Resulting Agency Comment |
+--------------------------+
|   [facility]              |
+--------------------------+
 
 
 
+----------------+---------+--------------------+--------------+
| Performing     | Address | City/State/Zipcode | Phone Number |
 
| Organization   |         |                    |              |
+----------------+---------+--------------------+--------------+
|   EXTERNAL LAB |         |                    |              |
+----------------+---------+--------------------+--------------+
 External Lab: Magnesium (2018)
 
+-------------+-------+-----------+------------+--------------+
| Component   | Value | Ref Range | Performed  | Pathologist  |
|             |       |           | At         | Signature    |
+-------------+-------+-----------+------------+--------------+
| Magnesium,  | 2.1   | 1.7 - 2.5 | EXTERNAL   |              |
| External    |       |           | LAB        |              |
+-------------+-------+-----------+------------+--------------+
 
 
 
+--------------------------+
| Resulting Agency Comment |
+--------------------------+
|   [facility]              |
+--------------------------+
 
 
 
+----------------+---------+--------------------+--------------+
| Performing     | Address | City/State/Zipcode | Phone Number |
| Organization   |         |                    |              |
+----------------+---------+--------------------+--------------+
|   EXTERNAL LAB |         |                    |              |
+----------------+---------+--------------------+--------------+
 External Lab: Calcium (2018)
 
+-----------+----------+------------+------------+--------------+
| Component | Value    | Ref Range  | Performed  | Pathologist  |
|           |          |            | At         | Signature    |
+-----------+----------+------------+------------+--------------+
| Calcium,  | 10.4 (A) | 8.4 - 10.2 | EXTERNAL   |              |
| External  |          |            | LAB        |              |
+-----------+----------+------------+------------+--------------+
 
 
 
+--------------------------+
| Resulting Agency Comment |
+--------------------------+
|   [facility]              |
+--------------------------+
 
 
 
+----------------+---------+--------------------+--------------+
| Performing     | Address | City/State/Zipcode | Phone Number |
| Organization   |         |                    |              |
+----------------+---------+--------------------+--------------+
|   EXTERNAL LAB |         |                    |              |
+----------------+---------+--------------------+--------------+
 External Lab: Carbon Dioxide (2018)
 
+-----------+--------+-----------+------------+--------------+
| Component | Value  | Ref Range | Performed  | Pathologist  |
 
|           |        |           | At         | Signature    |
+-----------+--------+-----------+------------+--------------+
| Carbon    | 17 (A) | 19 - 31   | EXTERNAL   |              |
| Dioxide,  |        |           | LAB        |              |
| External  |        |           |            |              |
+-----------+--------+-----------+------------+--------------+
 
 
 
+--------------------------+
| Resulting Agency Comment |
+--------------------------+
|   [facility]              |
+--------------------------+
 
 
 
+----------------+---------+--------------------+--------------+
| Performing     | Address | City/State/Zipcode | Phone Number |
| Organization   |         |                    |              |
+----------------+---------+--------------------+--------------+
|   EXTERNAL LAB |         |                    |              |
+----------------+---------+--------------------+--------------+
 External Lab: Chloride (2018)
 
+------------+-------+-----------+------------+--------------+
| Component  | Value | Ref Range | Performed  | Pathologist  |
|            |       |           | At         | Signature    |
+------------+-------+-----------+------------+--------------+
| Chloride,  | 104   | 95 - 112  | EXTERNAL   |              |
| External   |       |           | LAB        |              |
+------------+-------+-----------+------------+--------------+
 
 
 
+--------------------------+
| Resulting Agency Comment |
+--------------------------+
|   [facility]              |
+--------------------------+
 
 
 
+----------------+---------+--------------------+--------------+
| Performing     | Address | City/State/Zipcode | Phone Number |
| Organization   |         |                    |              |
+----------------+---------+--------------------+--------------+
|   EXTERNAL LAB |         |                    |              |
+----------------+---------+--------------------+--------------+
 External Lab: Potassium (2018)
 
+-------------+-------+-----------+------------+--------------+
| Component   | Value | Ref Range | Performed  | Pathologist  |
|             |       |           | At         | Signature    |
+-------------+-------+-----------+------------+--------------+
| Potassium,  | 4.5   | 3.6 - 5.1 | EXTERNAL   |              |
| External    |       |           | LAB        |              |
+-------------+-------+-----------+------------+--------------+
 
 
 
 
+--------------------------+
| Resulting Agency Comment |
+--------------------------+
|   [facility]              |
+--------------------------+
 
 
 
+----------------+---------+--------------------+--------------+
| Performing     | Address | City/State/Zipcode | Phone Number |
| Organization   |         |                    |              |
+----------------+---------+--------------------+--------------+
|   EXTERNAL LAB |         |                    |              |
+----------------+---------+--------------------+--------------+
 External Lab: Sodium (2018)
 
+-----------+-------+-----------+------------+--------------+
| Component | Value | Ref Range | Performed  | Pathologist  |
|           |       |           | At         | Signature    |
+-----------+-------+-----------+------------+--------------+
| Sodium,   | 139   | 132 - 143 | EXTERNAL   |              |
| External  |       |           | LAB        |              |
+-----------+-------+-----------+------------+--------------+
 
 
 
+--------------------------+
| Resulting Agency Comment |
+--------------------------+
|   [facility]              |
+--------------------------+
 
 
 
+----------------+---------+--------------------+--------------+
| Performing     | Address | City/State/Zipcode | Phone Number |
| Organization   |         |                    |              |
+----------------+---------+--------------------+--------------+
|   EXTERNAL LAB |         |                    |              |
+----------------+---------+--------------------+--------------+
 External Lab: Triglycerides (2018)
 
+-------------+---------+-----------+------------+--------------+
| Component   | Value   | Ref Range | Performed  | Pathologist  |
|             |         |           | At         | Signature    |
+-------------+---------+-----------+------------+--------------+
| Triglycerid | 163 (A) | 30 - 150  | EXTERNAL   |              |
| es,         |         |           | LAB        |              |
| External    |         |           |            |              |
+-------------+---------+-----------+------------+--------------+
 
 
 
+----------+
| Specimen |
+----------+
| Blood    |
+----------+
 
 
 
 
+--------------------------+
| Resulting Agency Comment |
+--------------------------+
|   [facility]              |
+--------------------------+
 
 
 
+----------------+---------+--------------------+--------------+
| Performing     | Address | City/State/Zipcode | Phone Number |
| Organization   |         |                    |              |
+----------------+---------+--------------------+--------------+
|   EXTERNAL LAB |         |                    |              |
+----------------+---------+--------------------+--------------+
 External Lab: Cholesterol, HDL (2018)
 
+-------------+-------+-----------+------------+--------------+
| Component   | Value | Ref Range | Performed  | Pathologist  |
|             |       |           | At         | Signature    |
+-------------+-------+-----------+------------+--------------+
| HDL         | 53.7  | 40 mg/dl  | EXTERNAL   |              |
| Cholesterol |       |           | LAB        |              |
| , External  |       |           |            |              |
+-------------+-------+-----------+------------+--------------+
 
 
 
+----------+
| Specimen |
+----------+
| Blood    |
+----------+
 
 
 
+--------------------------+
| Resulting Agency Comment |
+--------------------------+
|   [facility]              |
+--------------------------+
 
 
 
+----------------+---------+--------------------+--------------+
| Performing     | Address | City/State/Zipcode | Phone Number |
| Organization   |         |                    |              |
+----------------+---------+--------------------+--------------+
|   EXTERNAL LAB |         |                    |              |
+----------------+---------+--------------------+--------------+
 External Lab: Cholesterol, Total (2018)
 
+-------------+-------+-----------+------------+--------------+
| Component   | Value | Ref Range | Performed  | Pathologist  |
|             |       |           | At         | Signature    |
+-------------+-------+-----------+------------+--------------+
| Cholesterol | 131   | 200 mg/dl | EXTERNAL   |              |
| , Total,    |       |           | LAB        |              |
| External    |       |           |            |              |
 
+-------------+-------+-----------+------------+--------------+
 
 
 
+----------+
| Specimen |
+----------+
| Blood    |
+----------+
 
 
 
+--------------------------+
| Resulting Agency Comment |
+--------------------------+
|   [facility]              |
+--------------------------+
 
 
 
+----------------+---------+--------------------+--------------+
| Performing     | Address | City/State/Zipcode | Phone Number |
| Organization   |         |                    |              |
+----------------+---------+--------------------+--------------+
|   EXTERNAL LAB |         |                    |              |
+----------------+---------+--------------------+--------------+
 External Lab: Cholesterol, LDL (2018)
 
+-------------+-------+-----------+------------+--------------+
| Component   | Value | Ref Range | Performed  | Pathologist  |
|             |       |           | At         | Signature    |
+-------------+-------+-----------+------------+--------------+
| LDL         | 45    | 100       | EXTERNAL   |              |
| Cholesterol |       |           | LAB        |              |
| , Direct,   |       |           |            |              |
| External    |       |           |            |              |
+-------------+-------+-----------+------------+--------------+
 
 
 
+----------+
| Specimen |
+----------+
| Blood    |
+----------+
 
 
 
+--------------------------+
| Resulting Agency Comment |
+--------------------------+
|   [facility]              |
+--------------------------+
 
 
 
+----------------+---------+--------------------+--------------+
| Performing     | Address | City/State/Zipcode | Phone Number |
| Organization   |         |                    |              |
+----------------+---------+--------------------+--------------+
 
|   EXTERNAL LAB |         |                    |              |
+----------------+---------+--------------------+--------------+
 External Lab: eGFR (2018)
 
+-----------+-------+-----------+------------+--------------+
| Component | Value | Ref Range | Performed  | Pathologist  |
|           |       |           | At         | Signature    |
+-----------+-------+-----------+------------+--------------+
| eGFR,     | 33    |           | EXTERNAL   |              |
| External  |       |           | LAB        |              |
+-----------+-------+-----------+------------+--------------+
 
 
 
+----------+
| Specimen |
+----------+
| Blood    |
+----------+
 
 
 
+--------------------------+
| Resulting Agency Comment |
+--------------------------+
|   [facility]              |
+--------------------------+
 
 
 
+----------------+---------+--------------------+--------------+
| Performing     | Address | City/State/Zipcode | Phone Number |
| Organization   |         |                    |              |
+----------------+---------+--------------------+--------------+
|   EXTERNAL LAB |         |                    |              |
+----------------+---------+--------------------+--------------+
 External Lab: Creatinine (2018)
 
+-------------+----------+------------+------------+--------------+
| Component   | Value    | Ref Range  | Performed  | Pathologist  |
|             |          |            | At         | Signature    |
+-------------+----------+------------+------------+--------------+
| Creatinine, | 1.54 (A) | 0.7 - 1.18 | EXTERNAL   |              |
|  External   |          |            | LAB        |              |
+-------------+----------+------------+------------+--------------+
 
 
 
+----------+
| Specimen |
+----------+
| Blood    |
+----------+
 
 
 
+--------------------------+
| Resulting Agency Comment |
+--------------------------+
|   [facility]              |
 
+--------------------------+
 
 
 
+----------------+---------+--------------------+--------------+
| Performing     | Address | City/State/Zipcode | Phone Number |
| Organization   |         |                    |              |
+----------------+---------+--------------------+--------------+
|   EXTERNAL LAB |         |                    |              |
+----------------+---------+--------------------+--------------+
 documented in this encounter
 
 Visit Diagnoses
 Not on filedocumented in this encounter

## 2020-01-08 NOTE — XMS
Encounter Summary
  Created on: 2020
 
 Viviana Ricci
 External Reference #: [id number]
 : 47
 Sex: Female
 
 Demographics
 
 
+-----------------------+----------------------+
| Address               | [address]      |
|                       | [place]  [address] |
+-----------------------+----------------------+
| Home Phone            | [phone]      |
+-----------------------+----------------------+
| Preferred Language    | Unknown              |
+-----------------------+----------------------+
| Marital Status        | Single               |
+-----------------------+----------------------+
| [employer] Affiliation | [id number]                 |
+-----------------------+----------------------+
| Race                  | Unknown              |
+-----------------------+----------------------+
| Ethnic Group          | Unknown              |
+-----------------------+----------------------+
 
 
 Author
 
 
+--------------+--------------------------------------------+
| Author       | [facility]  |
|              | and [facility]                                |
+--------------+--------------------------------------------+
| Organization | [facility]  |
|              | and [place]                                |
+--------------+--------------------------------------------+
| Address      | Unknown                                    |
+--------------+--------------------------------------------+
| Phone        | Unavailable                                |
+--------------+--------------------------------------------+
 
 
 
 Support
 
 
+----------------+--------------+---------------------+-----------------+
| Name           | Relationship | Address             | Phone           |
+----------------+--------------+---------------------+-----------------+
| Ada/Ed Radhames | ECON         | [address]              | [phone] |
|                |              | [place]  |                 |
|                |              |  [id number]              |                 |
+----------------+--------------+---------------------+-----------------+
 
 
 
 
 Care Team Providers
 
 
+-----------------------+------+-------------+
| Care Team Member Name | Role | Phone       |
+-----------------------+------+-------------+
 PCP  | Unavailable |
+-----------------------+------+-------------+
 
 
 
 Encounter Details
 
 
+--------+-----------+---------------------+----------------------+----------------------+
| Date   | Type      | Department          | Care Team            | Description          |
+--------+-----------+---------------------+----------------------+----------------------+
| / | Off-Site  |   [facility]         |   Marzena Moser  | Unspecified          |
|    | Visit     | NEPHROLOGY  [address]   | M,   [address]      | hypertensive kidney  |
|        |           | [address]   | [address]      | disease with chronic |
|        |           | [place], [place]     | [place], [place]      |  kidney disease      |
|        |           | [address]          | [address]  [phone]  | stage I through      |
|        |           | [phone]        |  [phone] (Fax)  | stage IV, or         |
|        |           |                     |                      | unspecified (Primary |
|        |           |                     |                      |  Dx); FSGS (focal    |
|        |           |                     |                      | segmental            |
|        |           |                     |                      | glomerulosclerosis); |
|        |           |                     |                      |  Hyperlipidemia;     |
|        |           |                     |                      | Complications of     |
|        |           |                     |                      | transplanted kidney  |
+--------+-----------+---------------------+----------------------+----------------------+
 
 
 
 Social History
 
 
+--------------+-------+-----------+--------+------+
| Tobacco Use  | Types | Packs/Day | Years  | Date |
|              |       |           | Used   |      |
+--------------+-------+-----------+--------+------+
| Never Smoker |       |           |        |      |
+--------------+-------+-----------+--------+------+
 
 
 
+---------------------+---+---+---+
| Smokeless Tobacco:  |   |   |   |
| Never Used          |   |   |   |
+---------------------+---+---+---+
 
 
 
+---------------------------------------------------------------+
| Comments: some second hand smoke exposure, but fairly minimal |
+---------------------------------------------------------------+
 
 
 
 
+-------------+-------------+---------+----------+
| Alcohol Use | Drinks/Week | oz/Week | Comments |
+-------------+-------------+---------+----------+
| No          |             |         |          |
+-------------+-------------+---------+----------+
 
 
 
+------------------+---------------+
| Sex Assigned at  | Date Recorded |
| Birth            |               |
+------------------+---------------+
| Not on file      |               |
+------------------+---------------+
 
 
 
+----------------+-------------+-------------+
| Job Start Date | Occupation  | Industry    |
+----------------+-------------+-------------+
| Not on file    | Not on file | Not on file |
+----------------+-------------+-------------+
 
 
 
+----------------+--------------+------------+
| Travel History | Travel Start | Travel End |
+----------------+--------------+------------+
 
 
 
+-------------------------------------+
| No recent travel history available. |
+-------------------------------------+
 documented as of this encounter
 
 Last Filed Vital Signs
 
 
+-------------------+----------------------+----------------------+----------+
| Vital Sign        | Reading              | Time Taken           | Comments |
+-------------------+----------------------+----------------------+----------+
| Blood Pressure    | 140/70               | 2014  3:22 PM  |          |
|                   |                      | PDT                  |          |
+-------------------+----------------------+----------------------+----------+
| Pulse             | -                    | -                    |          |
+-------------------+----------------------+----------------------+----------+
| Temperature       | 36.8   C (98.2   F)  | 2014  3:22 PM  |          |
|                   |                      | PDT                  |          |
+-------------------+----------------------+----------------------+----------+
| Respiratory Rate  | -                    | -                    |          |
+-------------------+----------------------+----------------------+----------+
| Oxygen Saturation | -                    | -                    |          |
+-------------------+----------------------+----------------------+----------+
| Inhaled Oxygen    | -                    | -                    |          |
| Concentration     |                      |                      |          |
+-------------------+----------------------+----------------------+----------+
| Weight            | 128.4 kg (283 lb 1.1 | 2014  3:22 PM  |          |
|                   |  oz)                 | PDT                  |          |
+-------------------+----------------------+----------------------+----------+
 
| Height            | -                    | -                    |          |
+-------------------+----------------------+----------------------+----------+
| Body Mass Index   | 45.69                | 01/15/2014  3:36 PM  |          |
|                   |                      | PST                  |          |
+-------------------+----------------------+----------------------+----------+
 documented in this encounter
 
 Progress Marzena Tamayo DO - 2014  3:11 PM PDTFormatting of this note might be different
 from the original.
 Subjective:  NEPHROLOGY 
  
 Patient ID: Viviana Ricci is a 68 y.o. female.
 
 HPI Comments: 
 Followup for this 68 year old white female s/p renal allograft, 05, with previous allo
graft dysfunction secondary to FSGS, also with Type II DM, hypertension, hypothyroidism, hyp
erlipidemia, SHPTH,  previous low back pain, obesity/JACK, chronic pulmonary  hypertension, h
istorically related to centripetal obesity, and  CAD, s/p CABG x3 vessels,.  
 
 She is now having trouble with worsening pain with ROM, and ambulation in her left knee.  S
he has been conferring with Dr. Edgar Sanders, Orthopedic Surgery about this.  It has limited
 her activity.  She denies cough, fever, chills, or chest pain.
 
 MEDS:
 
 Prograf 1.5 mg, BID
 Mycophenolate 250 mg, TID.
 Prednisone 5 mg, daily.
  
 
 No Known Allergies
 
 Objective:   Blood pressure 140/70, temperature 36.8 C (98.2 F), weight 128.4 kg (283 l
b 1.1 oz).
 
  
 Physical Exam
 
 HEENT: No thrush.
 Heart:  Regular rate and rhythm with no S3, S4, murmur or rub.
 Lungs:   CTA bilaterally, no rales or wheezes.
 Abdomen:  Soft, obese, the renal allograft in RLQ is nontender, normoactive bowel sounds.
 Extremities: No clubbing, cyanosis,  edema, or foot ulcers.
 
 LAB:   
 
 Lab Results 
 Component Value Date 
   3/12/2014 
  K 5.0 3/12/2014 
   3/12/2014 
  CO2 21 3/12/2014 
  BUN 44 3/12/2014 
  CREEX 1.43 3/12/2014 
  EGFREX 37 3/12/2014 
   3/12/2014 
  CALCIUM 10.2 3/12/2014 
  PHOS 3.1 3/12/2014 
   3/12/2014 
 
  UEF5CHU 7.4 3/12/2014 
 
 Lab Results 
 Component Value Date 
  CHOLEX 155 3/12/2014 
  HDLEX 50.2 3/12/2014 
  LDLEX 65 3/12/2014 
  TRIGEX 197 3/12/2014 
 
 Lab Results 
 Component Value Date 
  TACROLIMUS 6.0 3/12/2014 
 .
 
 Lab Results 
 Component Value Date 
  WBCEX 6.8 3/12/2014 
  HGBEX 13.3 3/12/2014 
  HCTEX 39.6 3/12/2014 
  PLTEX 172 3/12/2014 
 
  
 
 Assessment: 
 
 1.  Renal Allograft--allograft function is stable.
 2.  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
 3.  Type 2 DM--good control.
 4.  Hyperlipidemia--stable on crestor.
 5.  Hypertension--good control.
 6.  SHPTH--stable for the circumstances.
 7.  Obesity/JACK/Pulmonary hypertension-- stable. 
 8.  CAD, s/p CABG, --stable on ASA, metoprolol, atorvastatin.
 9.  Type IV RTA--stable.
 10. Chronic Low Back pain--in remission.
 11.  worsening DJD, left knee--ambulation is worse?
 
 Plan: 
 
  
 1.  I discussed with Viviana that her Scr is better than her previous baseline.  Her tacrolimu
s level is very  good.
 2.  Her main concern is the left knee pain and arthritis.  Obviously, due to the allograft 
, she cannot take NSAID's.  If TKR were to become necessary, she is moderately high risk, bu
t I be live that  she could tolerate it.
 3.  Her BP appears stable on  the current dose of lisinopril.
 4.  Will plan to see her back in 5.5 months, to target a clinic closer to her home in [place], [place], which would be on 14 at [place], [place].
 
 CC:    Jose David Dalal M.D., [facility], [facility]
            Edgar Sanders MD, [facility], Orthopedics
 
 Electronically signed by Marzena Moser DO at 2014  5:34 PM PDTdocumented in thi
s encounter
 
 Plan of Treatment
 
 
+--------+-----------+------------+----------------------+-------------------+
| Date   | Type      | Specialty  | Care Team            | Description       |
 
+--------+-----------+------------+----------------------+-------------------+
| / | Office    | Cardiology |   Tonia Wilson,    |                   |
|    | Visit     |            | [facility]  [address]   |                   |
|        |           |            | St  [place]  |                   |
|        |           |            | [id number] [phone]  |                   |
|        |           |            |  [phone] (Fax)  |                   |
+--------+-----------+------------+----------------------+-------------------+
| / | Hospital  | Radiology  |   Popeye Tonwsend, |                   |
|    | Encounter |            |  MD  [address] |                   |
|        |           |            |  St.  [place],   |                   |
|        |           |            | [place] [address]             |                   |
|        |           |            | [phone]         |                   |
|        |           |            | [phone] (Fax)   |                   |
+--------+-----------+------------+----------------------+-------------------+
| / | Surgery   | Radiology  |   Popeye Townsend, | CV EP PPM SYSTEM  |
|    |           |            |  MD  [address] | IMPLANT           |
|        |           |            |  St.  [place],   |                   |
|        |           |            | [place] [address]             |                   |
|        |           |            | [phone]         |                   |
|        |           |            | [phone] (Fax)   |                   |
+--------+-----------+------------+----------------------+-------------------+
| 02/10/ | Clinical  | Cardiology |                      |                   |
|    | Support   |            |                      |                   |
+--------+-----------+------------+----------------------+-------------------+
| / | Office    | Cardiology |   Tonia Wilson,    |                   |
|    | Visit     |            | [facility]  [address]   |                   |
|        |           |            |   [place]  |                   |
|        |           |            | [address]  [phone]  |                   |
|        |           |            |  [phone] (Fax)  |                   |
+--------+-----------+------------+----------------------+-------------------+
| / | Off-Site  | Nephrology |   Marzena Moser  |                   |
|    | Visit     |            | DO ETIENNE  [address] [place] [address]      |                   |
|        |           |            | [address]      |                   |
|        |           |            | [place]      |                   |
|        |           |            | [address]  [phone]  |                   |
|        |           |            |  [phone] (Fax)  |                   |
+--------+-----------+------------+----------------------+-------------------+
 documented as of this encounter
 
 Visit Diagnoses
 
 
+-----------------------------------------------------------------------------------------+
| Diagnosis                                                                               |
+-----------------------------------------------------------------------------------------+
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
| stage IV, or unspecified - Primary                                                      |
+-----------------------------------------------------------------------------------------+
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
| membranous glomerulonephritis                                                           |
+-----------------------------------------------------------------------------------------+
|   Hyperlipidemia  Other and unspecified hyperlipidemia                                  |
+-----------------------------------------------------------------------------------------+
|   Complications of transplanted kidney                                                  |
+-----------------------------------------------------------------------------------------+
 documented in this encounter

## 2020-01-08 NOTE — XMS
Encounter Summary
  Created on: 2020
 
 Viviana Ricci
 External Reference #: [id number]
 : 47
 Sex: Female
 
 Demographics
 
 
+-----------------------+----------------------+
| Address               | [address]      |
|                       | [place]  [address] |
+-----------------------+----------------------+
| Home Phone            | [phone]      |
+-----------------------+----------------------+
| Preferred Language    | Unknown              |
+-----------------------+----------------------+
| Marital Status        | Single               |
+-----------------------+----------------------+
| Baptism Affiliation | [id number]                 |
+-----------------------+----------------------+
| Race                  | Unknown              |
+-----------------------+----------------------+
| Ethnic Group          | Unknown              |
+-----------------------+----------------------+
 
 
 Author
 
 
+--------------+--------------------------------------------+
| Author       | [facility]  |
|              | and [facility]                                |
+--------------+--------------------------------------------+
| Organization | [facility]  |
|              | and [facility]                                |
+--------------+--------------------------------------------+
| Address      | Unknown                                    |
+--------------+--------------------------------------------+
| Phone        | Unavailable                                |
+--------------+--------------------------------------------+
 
 
 
 Support
 
 
+----------------+--------------+---------------------+-----------------+
| Name           | Relationship | Address             | Phone           |
+----------------+--------------+---------------------+-----------------+
| Ada/Ed Radhames | ECON         | [address]              | [phone] |
|                |              | [place]  |                 |
|                |              |  [address]              |                 |
+----------------+--------------+---------------------+-----------------+
 
 
 
 
 Care Team Providers
 
 
+------------------------+------+-----------------+
| Care Team Member Name  | Role | Phone           |
+------------------------+------+-----------------+
| Marzena Moser DO | PCP  | [phone] |
+------------------------+------+-----------------+
 
 
 
 Reason for Visit
 
 
+-------------+----------+
| Reason      | Comments |
+-------------+----------+
| Medication  |          |
| Management  |          |
+-------------+----------+
 
 
 
 Encounter Details
 
 
+--------+-----------+---------------------+----------------------+-------------+
| Date   | Type      | Department          | Care Team            | Description |
+--------+-----------+---------------------+----------------------+-------------+
| / | Telephone |   [facility]         |   ChengmaxxMarzena  | Medication  |
| 2019   |           | NEPHROLOGY  [address]   | M, DO  [address]      | Management  |
|        |           | [address]   | [address]      |             |
|        |           | [place], [place]     | [place], [place]      |             |
|        |           | [address]          | [address]  [phone]  |             |
|        |           | [phone]        |  [phone] (Fax)  |             |
+--------+-----------+---------------------+----------------------+-------------+
 
 
 
 Social History
 
 
+--------------+-------+-----------+--------+------+
| Tobacco Use  | Types | Packs/Day | Years  | Date |
|              |       |           | Used   |      |
+--------------+-------+-----------+--------+------+
| Never Smoker |       |           |        |      |
+--------------+-------+-----------+--------+------+
 
 
 
+---------------------+---+---+---+
| Smokeless Tobacco:  |   |   |   |
| Never Used          |   |   |   |
+---------------------+---+---+---+
 
 
 
+---------------------------------------------------------------+
 
| Comments: some second hand smoke exposure, but fairly minimal |
+---------------------------------------------------------------+
 
 
 
+-------------+-------------+---------+----------+
| Alcohol Use | Drinks/Week | oz/Week | Comments |
+-------------+-------------+---------+----------+
| No          |             |         |          |
+-------------+-------------+---------+----------+
 
 
 
+------------------+---------------+
| Sex Assigned at  | Date Recorded |
| Birth            |               |
+------------------+---------------+
| Not on file      |               |
+------------------+---------------+
 
 
 
+----------------+-------------+-------------+
| Job Start Date | Occupation  | Industry    |
+----------------+-------------+-------------+
| Not on file    | Not on file | Not on file |
+----------------+-------------+-------------+
 
 
 
+----------------+--------------+------------+
| Travel History | Travel Start | Travel End |
+----------------+--------------+------------+
 
 
 
+-------------------------------------+
| No recent travel history available. |
+-------------------------------------+
 documented as of this encounter
 
 Plan of Treatment
 
 
+--------+-----------+------------+----------------------+-------------------+
| Date   | Type      | Specialty  | Care Team            | Description       |
+--------+-----------+------------+----------------------+-------------------+
| / | Office    | Cardiology |   RakeshmaxxArlen fuentesa,    |                   |
|    | Visit     |            | ARNP  [address]   |                   |
|        |           |            | [place], [place]  |                   |
|        |           |            | [address]  [phone]  |                   |
|        |           |            |  [phone] (Fax)  |                   |
+--------+-----------+------------+----------------------+-------------------+
| / | Hospital  | Radiology  |   Popeye Townsend, |                   |
|    | Encounter |            |  MD  [address] |                   |
|        |           |            |  [place],   |                   |
|        |           |            | [place] [address]             |                   |
|        |           |            | [phone]         |                   |
|        |           |            | [phone] (Fax)   |                   |
+--------+-----------+------------+----------------------+-------------------+
 
| / | Surgery   | Radiology  |   Popeye Townsend, | CV EP PPM SYSTEM  |
|    |           |            |  MD  [address] | IMPLANT           |
|        |           |            |  St.  [place],   |                   |
|        |           |            | [place] [address]             |                   |
|        |           |            | [phone]         |                   |
|        |           |            | [phone] (Fax)   |                   |
+--------+-----------+------------+----------------------+-------------------+
| 02/10/ | Clinical  | Cardiology |                      |                   |
|    | Support   |            |                      |                   |
+--------+-----------+------------+----------------------+-------------------+
| / | Office    | Cardiology |   Tonia Wilson,    |                   |
|    | Visit     |            | ARNP  [address]   |                   |
|        |           |            | [place]  |                   |
|        |           |            | [address]  [phone]  |                   |
|        |           |            |  [phone] (Fax)  |                   |
+--------+-----------+------------+----------------------+-------------------+
| / | Off-Site  | Nephrology |   Marzena Moser  |                   |
|    | Visit     |            | M, DO  [address]      |                   |
|        |           |            | [address]      |                   |
|        |           |            | [place]      |                   |
|        |           |            | [id number] [phone]  |                   |
|        |           |            |  [phone] (Fax)  |                   |
+--------+-----------+------------+----------------------+-------------------+
 documented as of this encounter
 
 Visit Diagnoses
 Not on filedocumented in this encounter

## 2020-01-08 NOTE — XMS
Encounter Summary
  Created on: 2020
 
 Viviana Ricci
 External Reference #: [id number]
 : 47
 Sex: Female
 
 Demographics
 
 
+-----------------------+----------------------+
| Address               | [address]      |
|                       | [place]  [address] |
+-----------------------+----------------------+
| Home Phone            | [phone]      |
+-----------------------+----------------------+
| Preferred Language    | Unknown              |
+-----------------------+----------------------+
| Marital Status        | Single               |
+-----------------------+----------------------+
| Taoism Affiliation | [id number]                 |
+-----------------------+----------------------+
| Race                  | Unknown              |
+-----------------------+----------------------+
| Ethnic Group          | Unknown              |
+-----------------------+----------------------+
 
 
 Author
 
 
+--------------+--------------------------------------------+
| Author       | [facility]  |
|              | [facility]                                |
+--------------+--------------------------------------------+
| Organization | [facility]  |
|              | and [facility]                                |
+--------------+--------------------------------------------+
| Address      | Unknown                                    |
+--------------+--------------------------------------------+
| Phone        | Unavailable                                |
+--------------+--------------------------------------------+
 
 
 
 Support
 
 
+----------------+--------------+---------------------+-----------------+
| Name           | Relationship | Address             | Phone           |
+----------------+--------------+---------------------+-----------------+
| Ada/Ed Radhames | ECON         | [place]              | [phone] |
|                |              | [place]  |                 |
|                |              |  [id number]              |                 |
+----------------+--------------+---------------------+-----------------+
 
 
 
 
 Care Team Providers
 
 
+------------------------+------+-----------------+
| Care Team Member Name  | Role | Phone           |
+------------------------+------+-----------------+
| Marzena Moser PCP  | [phone] |
+------------------------+------+-----------------+
 
 
 
 Reason for Visit
 Diagnostic/Screening (Routine)
 
+--------+--------+-----------+--------------+--------------+---------------+
| Status | Reason | Specialty | Diagnoses /  | Referred By  | Referred To   |
|        |        |           | Procedures   | Contact      | Contact       |
+--------+--------+-----------+--------------+--------------+---------------+
| Closed |        | Radiology |   Diagnoses  |   [facility]  |   [facility] |
|        |        |           |  Coronary    | Tonia, ARNP   |  Medicine     |
|        |        |           | artery       | [address] | [address]  |
|        |        |           | disease      |  St  [place]   |  [place], |
|        |        |           | involving    | [place]    |  [place]           |
|        |        |           | native       | [address]        | [id number]    |
|        |        |           | coronary     | Phone:       | Phone:        |
|        |        |           | artery of    | [phone] | [phone]  |
|        |        |           | native heart |   Fax:       |  Fax:         |
|        |        |           |  without     | [phone] | [phone]  |
|        |        |           | angina       |              |               |
|        |        |           | pectoris     |              |               |
|        |        |           | Hypertension |              |               |
|        |        |           | , essential  |              |               |
|        |        |           |  Mixed       |              |               |
|        |        |           | hyperlipidem |              |               |
|        |        |           | ia           |              |               |
|        |        |           | Procedures   |              |               |
|        |        |           | NM Nuclear   |              |               |
|        |        |           | Stress Test  |              |               |
|        |        |           | (Vasodilator |              |               |
|        |        |           | )            |              |               |
+--------+--------+-----------+--------------+--------------+---------------+
 
 
 
 
 Encounter Details
 
 
+--------+-----------+----------------------+----------------------+-------------+
| Date   | Type      | Department           | Care Team            | Description |
+--------+-----------+----------------------+----------------------+-------------+
| 10/30/ | Hospital  |   [facility] |   Tonia Wilson,    |             |
| 2019   | Encounter |  MED CTR NUCLEAR     | ARNP  [address]   |             |
|        |           | MEDICINE  [address]      | [address]  [place], [place]  |             |
|        |           | [address]  [place], | [address]  [phone]  |             |
|        |           |  [place] [address]       |  [phone] (Fax)  |             |
|        |           | [phone]         |                      |             |
+--------+-----------+----------------------+----------------------+-------------+
 
 
 
 
 Social History
 
 
+--------------+-------+-----------+--------+------+
| Tobacco Use  | Types | Packs/Day | Years  | Date |
|              |       |           | Used   |      |
+--------------+-------+-----------+--------+------+
| Never Smoker |       |           |        |      |
+--------------+-------+-----------+--------+------+
 
 
 
+---------------------+---+---+---+
| Smokeless Tobacco:  |   |   |   |
| Never Used          |   |   |   |
+---------------------+---+---+---+
 
 
 
+---------------------------------------------------------------+
| Comments: some second hand smoke exposure, but fairly minimal |
+---------------------------------------------------------------+
 
 
 
+-------------+-------------+---------+----------+
| Alcohol Use | Drinks/Week | oz/Week | Comments |
+-------------+-------------+---------+----------+
| No          |             |         |          |
+-------------+-------------+---------+----------+
 
 
 
+------------------+---------------+
| Sex Assigned at  | Date Recorded |
| Birth            |               |
+------------------+---------------+
| Not on file      |               |
+------------------+---------------+
 
 
 
+----------------+-------------+-------------+
| Job Start Date | Occupation  | Industry    |
+----------------+-------------+-------------+
| Not on file    | Not on file | Not on file |
+----------------+-------------+-------------+
 
 
 
+----------------+--------------+------------+
| Travel History | Travel Start | Travel End |
+----------------+--------------+------------+
 
 
 
+-------------------------------------+
| No recent travel history available. |
 
+-------------------------------------+
 documented as of this encounter
 
 Medications at Time of Discharge
 
 
+----------------------+----------------------+-----------+---------+----------+-----------+
| Medication           | Sig                  | Dispensed | Refills | Start    | End Date  |
|                      |                      |           |         | Date     |           |
+----------------------+----------------------+-----------+---------+----------+-----------+
|   allopurinol        | take 1 tablet by     |   30      | 11      | 20 |           |
| (ZYLOPRIM) 100 mg    | mouth once daily     | tablet    |         | 18       |           |
| tablet               |                      |           |         |          |           |
+----------------------+----------------------+-----------+---------+----------+-----------+
|   aspirin 81 mg EC   | Take 81 mg by mouth  |           | 0       |          |           |
| tablet               | Daily.               |           |         |          |           |
+----------------------+----------------------+-----------+---------+----------+-----------+
|   B-D INS SYRINGE    | USE BEFORE MEALS AS  |   1 each  | 11      | 20 |           |
| 0.5CC/31GX5/16 31G X | DIRECTED             |           |         | 18       |           |
|  " 0.5 ML MISC   |                      |           |         |          |           |
+----------------------+----------------------+-----------+---------+----------+-----------+
|   cholecalciferol    | Take 2,000 Units by  |           | 0       |          |           |
| (VITAMIN D-3) 2000   | mouth Every other    |           |         |          |           |
| UNITS                | day.                 |           |         |          |           |
| TABSIndications:     |                      |           |         |          |           |
| Unspecified          |                      |           |         |          |           |
| hypertensive kidney  |                      |           |         |          |           |
| disease with chronic |                      |           |         |          |           |
|  kidney disease      |                      |           |         |          |           |
| stage I through      |                      |           |         |          |           |
| stage IV, or         |                      |           |         |          |           |
| unspecified(403.90), |                      |           |         |          |           |
|  FSGS (focal         |                      |           |         |          |           |
| segmental            |                      |           |         |          |           |
| glomerulosclerosis), |                      |           |         |          |           |
|  Hyperlipidemia,     |                      |           |         |          |           |
| Complications of     |                      |           |         |          |           |
| transplanted kidney  |                      |           |         |          |           |
+----------------------+----------------------+-----------+---------+----------+-----------+
|   cinacalcet         | take 1 tablet by     |   90      | 3       | 20 |           |
| (SENSIPAR) 30 mg     | mouth once daily     | tablet    |         | 19       |           |
| tablet               |                      |           |         |          |           |
+----------------------+----------------------+-----------+---------+----------+-----------+
|   cyclobenzaprine    | Take 1 tablet by     |   45      | 0       | 20 |           |
| (FLEXERIL) 10 mg     | mouth Twice  daily   | tablet    |         | 19       |           |
| tablet               | as needed for Muscle |           |         |          |           |
|                      |  spasms.             |           |         |          |           |
+----------------------+----------------------+-----------+---------+----------+-----------+
|   fludrocortisone    | Take 1 tablet by     |   45      | 3       | 20 |           |
| (FLORINEF) 0.1 mg    | mouth Every other    | tablet    |         | 19       |           |
| tablet               | day.                 |           |         |          |           |
+----------------------+----------------------+-----------+---------+----------+-----------+
|   furosemide (LASIX) | Take 1 tablet by     |   30      | 11      | 20 |           |
|  40 mg               | mouth Daily as       | tablet    |         | 18       |           |
| tabletIndications:   | needed.              |           |         |          |           |
| Edema, unspecified   |                      |           |         |          |           |
| type, FSGS (focal    |                      |           |         |          |           |
| segmental            |                      |           |         |          |           |
| glomerulosclerosis), |                      |           |         |          |           |
|  Hyperlipidemia      |                      |           |         |          |           |
 
+----------------------+----------------------+-----------+---------+----------+-----------+
|   glucose blood VI   | Check blood sugar    |   100     | 12      | 20 |           |
| test strips (ONE     | before each meal and | each      |         | 12       |           |
| TOUCH ULTRA TEST)    |  as directed         |           |         |          |           |
| stripIndications:    |                      |           |         |          |           |
| Unspecified          |                      |           |         |          |           |
| hypertensive kidney  |                      |           |         |          |           |
| disease with chronic |                      |           |         |          |           |
|  kidney disease      |                      |           |         |          |           |
| stage I through      |                      |           |         |          |           |
| stage IV, or         |                      |           |         |          |           |
| unspecified(403.90), |                      |           |         |          |           |
|  Complications of    |                      |           |         |          |           |
| transplanted kidney, |                      |           |         |          |           |
|  Diabetes mellitus   |                      |           |         |          |           |
| type II,             |                      |           |         |          |           |
| uncontrolled (HCC),  |                      |           |         |          |           |
| Hyperlipidemia       |                      |           |         |          |           |
+----------------------+----------------------+-----------+---------+----------+-----------+
|   insulin glargine   | inject 20 units      |   10 vial | 11      | 20 |           |
| (LANTUS) 100         | subcutaneously every |           |         | 18       |           |
| units/mL injection   |  morning             |           |         |          |           |
| (vial)Indications:   |                      |           |         |          |           |
| Type 2 diabetes      |                      |           |         |          |           |
| mellitus with        |                      |           |         |          |           |
| chronic kidney       |                      |           |         |          |           |
| disease, without     |                      |           |         |          |           |
| long-term current    |                      |           |         |          |           |
| use of insulin,      |                      |           |         |          |           |
| unspecified CKD      |                      |           |         |          |           |
| stage (HCC), Kidney  |                      |           |         |          |           |
| replaced by          |                      |           |         |          |           |
| transplant           |                      |           |         |          |           |
+----------------------+----------------------+-----------+---------+----------+-----------+
|   insulin lispro     | inject               |   10 vial | 11      | 11/15/20 |           |
| (HUMALOG) 100        | subcutaneously       |           |         | 17       |           |
| units/mL injection   | BEFORE MEALS         |           |         |          |           |
| (vial)Indications:   | ACCORDING TO SLIDING |           |         |          |           |
| Type 2 diabetes      |  SCALE Max dose 20   |           |         |          |           |
| mellitus with        | units daily          |           |         |          |           |
| complication, with   |                      |           |         |          |           |
| long-term current    |                      |           |         |          |           |
| use of insulin (HCC) |                      |           |         |          |           |
+----------------------+----------------------+-----------+---------+----------+-----------+
|   levothyroxine      | take 1 tablet by     |   30      | 11      | 20 |           |
| (SYNTHROID) 50 mcg   | mouth once daily     | tablet    |         | 18       |           |
| tablet               |                      |           |         |          |           |
+----------------------+----------------------+-----------+---------+----------+-----------+
|   lisinopril         | take 1 tablet by     |   90      | 3       | 20 |           |
| (PRINIVIL,ZESTRIL)   | mouth once daily     | tablet    |         | 17       |           |
| 30 MG tablet         |                      |           |         |          |           |
+----------------------+----------------------+-----------+---------+----------+-----------+
|   loperamide         | Take 1 capsule by    |   60      | 5       | 20 |           |
| (ANTI-DIARRHEAL) 2   | mouth 4 times daily  | capsule   |         | 14       |           |
| mg                   | as needed.           |           |         |          |           |
| capsuleIndications:  |                      |           |         |          |           |
| Unspecified          |                      |           |         |          |           |
| hypertensive kidney  |                      |           |         |          |           |
| disease with chronic |                      |           |         |          |           |
|  kidney disease      |                      |           |         |          |           |
 
| stage I through      |                      |           |         |          |           |
| stage IV, or         |                      |           |         |          |           |
| unspecified(403.90), |                      |           |         |          |           |
|  FSGS (focal         |                      |           |         |          |           |
| segmental            |                      |           |         |          |           |
| glomerulosclerosis), |                      |           |         |          |           |
|  Hypothyroidism,     |                      |           |         |          |           |
| Hyperlipidemia       |                      |           |         |          |           |
+----------------------+----------------------+-----------+---------+----------+-----------+
|   losartan (COZAAR)  | take 1 tablet by     |   90      | 3       | 20 |           |
| 50 mg tablet         | mouth once daily     | tablet    |         | 18       |           |
+----------------------+----------------------+-----------+---------+----------+-----------+
|   magnesium oxide    | take 1 tablet by     |   60      | 11      | 10/02/20 |           |
| (MAG-OX) 400 mg      | mouth twice a day    | tablet    |         | 18       |           |
| tablet               |                      |           |         |          |           |
+----------------------+----------------------+-----------+---------+----------+-----------+
|   predniSONE         | take 1 tablet by     |   90      | 3       | 10/02/20 |           |
| (DELTASONE) 5 mg     | mouth once daily     | tablet    |         | 18       |           |
| tablet               |                      |           |         |          |           |
+----------------------+----------------------+-----------+---------+----------+-----------+
|   Prenatal Vit-Fe    | take 1 tablet by     |   30      | 11      | 20 |           |
| Fumarate-FA (PNV     | mouth once daily.    | tablet    |         | 18       |           |
| PRENATAL PLUS        |                      |           |         |          |           |
| MULTIVITAMIN) 27-1   |                      |           |         |          |           |
| MG TABS              |                      |           |         |          |           |
+----------------------+----------------------+-----------+---------+----------+-----------+
|   Respiratory        | Continue nocturnal   |   1 each  | 0       | 20 |           |
| Therapy Supplies     | O2 at 2 l/m through  |           |         | 18       |           |
| MISCIndications: JACK | CPAP for lifetime.   |           |         |          |           |
|  (obstructive sleep  | Dx: JACK G47.33       |           |         |          |           |
| apnea)               | Please provide new   |           |         |          |           |
|                      | nasal mask for CPAP  |           |         |          |           |
|                      | and any other needed |           |         |          |           |
|                      |  replacement         |           |         |          |           |
|                      | supplies.            |           |         |          |           |
+----------------------+----------------------+-----------+---------+----------+-----------+
|   Respiratory        | Decrease CPAP to     |   1 each  | 0       | 20 |           |
| Therapy Supplies     | 12-18 cmH2O          |           |         | 13       |           |
| MISC                 | Diagnosis            |           |         |          |           |
|                      | Code(s)327.23.       |           |         |          |           |
|                      | Please send order to |           |         |          |           |
|                      |  [facility].     |           |         |          |           |
+----------------------+----------------------+-----------+---------+----------+-----------+
|   rosuvastatin       | take 1 tablet by     |   30      | 11      | 20 |           |
| (CRESTOR) 20 mg      | mouth NIGHTLY        | tablet    |         | 18       |           |
| tablet               |                      |           |         |          |           |
+----------------------+----------------------+-----------+---------+----------+-----------+
|   fludrocortisone    | Take 1 tablet by     |           | 0       |          |  |
| (FLORINEF) 0.1 mg    | mouth Every other    |           |         |          | 9         |
| tablet               | day.                 |           |         |          |           |
+----------------------+----------------------+-----------+---------+----------+-----------+
|   metoprolol         | take 1 tablet by     |   60      | 11      | 20 |  |
| tartrate (LOPRESSOR) | mouth twice a day    | tablet    |         | 19       | 9         |
|  25 mg tablet        |                      |           |         |          |           |
+----------------------+----------------------+-----------+---------+----------+-----------+
|   mycophenolate      | Take 1 capsule by    |   60      | 11      | 20 |  |
| (CELLCEPT) 250 mg    | mouth 2 times daily. | capsule   |         | 18       | 9         |
| capsuleIndications:  |                      |           |         |          |           |
| Kidney replaced by   |                      |           |         |          |           |
| transplant           |                      |           |         |          |           |
 
+----------------------+----------------------+-----------+---------+----------+-----------+
|   QVAR REDIHALER 80  |                      |           | 0       | 20 |  |
| MCG/ACT inhaler      |                      |           |         | 18       | 9         |
+----------------------+----------------------+-----------+---------+----------+-----------+
|   tacrolimus         | Take 1 capsule by    |   240     | 0       | 20 |  |
| (PROGRAF) 1 mg       | mouth 2 times daily. | capsule   |         | 19       | 9         |
| capsuleIndications:  |  For a total dose of |           |         |          |           |
| Kidney replaced by   |  1 mg, by mouth, 2   |           |         |          |           |
| transplant           | times daily.         |           |         |          |           |
+----------------------+----------------------+-----------+---------+----------+-----------+
 documented as of this encounter
 
 Plan of Treatment
 
 
+--------+-----------+------------+----------------------+-------------------+
| Date   | Type      | Specialty  | Care Team            | Description       |
+--------+-----------+------------+----------------------+-------------------+
| / | Office    | Cardiology |   Tonia Wilson,    |                   |
|    | Visit     |            | ARNP  [address]   |                   |
|        |           |            | [place]  |                   |
|        |           |            | [id number] [phone]  |                   |
|        |           |            |  [phone] (Fax)  |                   |
+--------+-----------+------------+----------------------+-------------------+
| / | Hospital  | Radiology  |   Popeye Townsend, |                   |
|    | Encounter |            |  MD  [address] |                   |
|        |           |            |  [place]   |                   |
|        |           |            | [place] [address]             |                   |
|        |           |            | [phone]         |                   |
|        |           |            | [phone] (Fax)   |                   |
+--------+-----------+------------+----------------------+-------------------+
| / | Surgery   | Radiology  |   Cary Yinmarissa, | CV EP PPM SYSTEM  |
|    |           |            |  MD  [address] | IMPLANT           |
|        |           |            |  St.  [place],   |                   |
|        |           |            | [place] [address]             |                   |
|        |           |            | [phone]         |                   |
|        |           |            | [phone] (Fax)   |                   |
+--------+-----------+------------+----------------------+-------------------+
| 02/10/ | Clinical  | Cardiology |                      |                   |
|    | Support   |            |                      |                   |
+--------+-----------+------------+----------------------+-------------------+
| / | Office    | Cardiology |   Tonia Wilson,    |                   |
|    | Visit     |            | [facility]  [address]   |                   |
|        |           |            |   [place]  |                   |
|        |           |            | [address]  [phone]  |                   |
|        |           |            |  [phone] (Fax)  |                   |
+--------+-----------+------------+----------------------+-------------------+
| / | Off-Site  | Nephrology |   Marzena Moser  |                   |
2020   | Visit     |            | DO ETIENNE  [address]      |                   |
|        |           |            | [address]      |                   |
|        |           |            | [place]      |                   |
|        |           |            | [address]  [phone]  |                   |
|        |           |            |  [phone] (Fax)  |                   |
+--------+-----------+------------+----------------------+-------------------+
 documented as of this encounter
 
 Procedures
 
 
+----------------------+--------+-------------+----------------------+----------------------
 
+
| Procedure Name       | Priori | Date/Time   | Associated Diagnosis | Comments             
|
|                      | ty     |             |                      |                      
|
+----------------------+--------+-------------+----------------------+----------------------
+
| NM NUCLEAR STRESS    | Routin | 10/30/2019  |   Coronary artery    |   Results for this   
|
| TEST (PHARMACOLOGIC  | e      | 12:56 PM    | disease involving    | procedure are in the 
|
| - VASODILATOR)       |        | PDT         | native coronary      |  results section.    
|
|                      |        |             | artery of native     |                      
|
|                      |        |             | heart without angina |                      
|
|                      |        |             |  pectoris            |                      
|
|                      |        |             | Hypertension,        |                      
|
|                      |        |             | essential  Mixed     |                      
|
|                      |        |             | hyperlipidemia       |                      
|
+----------------------+--------+-------------+----------------------+----------------------
+
 documented in this encounter
 
 Visit Diagnoses
 Not on filedocumented in this encounter
 
 Administered Medications
 
 
+---------------------------------+--------+----------+----------+------+------+
| Medication Order                | MAR    | Action   | Dose     | Rate | Site |
|                                 | Action | Date     |          |      |      |
+---------------------------------+--------+----------+----------+------+------+
|   technetium TC-99M sestamibi   | Given  | 10/30/20 | 34.1     |      |      |
| (CARDIOLITE) injection 34.1     |        | 19 12:55 | millicur |      |      |
| millicurie  34.1 millicurie,    |        |  PM PDT  | ies      |      |      |
| Intravenous, ONCE PRN, Other,   |        |          |          |      |      |
| Starting Wed 10/30/19 at 1255,  |        |          |          |      |      |
| For 1 dose, Nuclear Medicine    |        |          |          |      |      |
+---------------------------------+--------+----------+----------+------+------+
 
 
 
+---+---+
|   |   |
+---+---+
 documented in this encounter

## 2020-01-08 NOTE — XMS
Encounter Summary
  Created on: 2020
 
 Viviana Ricci
 External Reference #: [id number]
 : 47
 Sex: Female
 
 Demographics
 
 
+-----------------------+----------------------+
| Address               | [address]      |
|                       | [place]  [address] |
+-----------------------+----------------------+
| Home Phone            | [phone]      |
+-----------------------+----------------------+
| Preferred Language    | Unknown              |
+-----------------------+----------------------+
| Marital Status        | Single               |
+-----------------------+----------------------+
| Scientology Affiliation | [id number]                 |
+-----------------------+----------------------+
| Race                  | Unknown              |
+-----------------------+----------------------+
| Ethnic Group          | Unknown              |
+-----------------------+----------------------+
 
 
 Author
 
 
+--------------+--------------------------------------------+
| Author       | [facility]  |
|              | and [facility]                                |
+--------------+--------------------------------------------+
| Organization | [facility]  |
|              | and [facility]                                |
+--------------+--------------------------------------------+
| Address      | Unknown                                    |
+--------------+--------------------------------------------+
| Phone        | Unavailable                                |
+--------------+--------------------------------------------+
 
 
 
 Support
 
 
+----------------+--------------+---------------------+-----------------+
| Name           | Relationship | Address             | Phone           |
+----------------+--------------+---------------------+-----------------+
| Ada/Ed Radhames | ECON         | [address]              | [phone] |
|                |              | [place]  |                 |
|                |              |  [address]              |                 |
+----------------+--------------+---------------------+-----------------+
 
 
 
 
 Care Team Providers
 
 
+-----------------------+------+-------------+
| Care Team Member Name | Role | Phone       |
+-----------------------+------+-------------+
 PCP  | Unavailable |
+-----------------------+------+-------------+
 
 
 
 Encounter Details
 
 
+--------+----------+---------------------+----------------------+-------------+
| Date   | Type     | Department          | Care Team            | Description |
+--------+----------+---------------------+----------------------+-------------+
| / | Abstract |   [facility] [place]         |   Marzena Moser  |             |
| 2017   |          | NEPHROLOGY  [address]   | M, DO  [address]      |             |
|        |          | [address]   | [address]      |             |
|        |          | [place], [place]     | [place]      |             |
|        |          | [address]          | [address]  [phone]  |             |
|        |          | [phone]        |  [phone] (Fax)  |             |
+--------+----------+---------------------+----------------------+-------------+
 
 
 
 Social History
 
 
+--------------+-------+-----------+--------+------+
| Tobacco Use  | Types | Packs/Day | Years  | Date |
|              |       |           | Used   |      |
+--------------+-------+-----------+--------+------+
| Never Smoker |       |           |        |      |
+--------------+-------+-----------+--------+------+
 
 
 
+---------------------+---+---+---+
| Smokeless Tobacco:  |   |   |   |
| Never Used          |   |   |   |
+---------------------+---+---+---+
 
 
 
+---------------------------------------------------------------+
| Comments: some second hand smoke exposure, but fairly minimal |
+---------------------------------------------------------------+
 
 
 
+-------------+-------------+---------+----------+
| Alcohol Use | Drinks/Week | oz/Week | Comments |
+-------------+-------------+---------+----------+
| No          |             |         |          |
+-------------+-------------+---------+----------+
 
 
 
 
+------------------+---------------+
| Sex Assigned at  | Date Recorded |
| Birth            |               |
+------------------+---------------+
| Not on file      |               |
+------------------+---------------+
 
 
 
+----------------+-------------+-------------+
| Job Start Date | Occupation  | Industry    |
+----------------+-------------+-------------+
| Not on file    | Not on file | Not on file |
+----------------+-------------+-------------+
 
 
 
+----------------+--------------+------------+
| Travel History | Travel Start | Travel End |
+----------------+--------------+------------+
 
 
 
+-------------------------------------+
| No recent travel history available. |
+-------------------------------------+
 documented as of this encounter
 
 Plan of Treatment
 
 
+--------+-----------+------------+----------------------+-------------------+
| Date   | Type      | Specialty  | Care Team            | Description       |
+--------+-----------+------------+----------------------+-------------------+
| / | Office    | Cardiology |   Tonia Wilson,    |                   |
|    | Visit     |            | ARNJESSICA  [address]   |                   |
|        |           |            | [place]  |                   |
|        |           |            | [address]  [phone]  |                   |
|        |           |            |  [phone] (Fax)  |                   |
+--------+-----------+------------+----------------------+-------------------+
| / | Hospital  | Radiology  |   Popeye Townsend, |                   |
|    | Encounter |            |  MD  [address] |                   |
|        |           |            |  St.  [place],   |                   |
|        |           |            | [place] [address]             |                   |
|        |           |            | [phone]         |                   |
|        |           |            | [phone] (Fax)   |                   |
+--------+-----------+------------+----------------------+-------------------+
| / | Surgery   | Radiology  |   Popeye Townsend, | CV EP PPM SYSTEM  |
|    |           |            |  MD  [address] | IMPLANT           |
|        |           |            |  St.  [place],   |                   |
|        |           |            | [place] [address]             |                   |
|        |           |            | [phone]         |                   |
|        |           |            | [phone] (Fax)   |                   |
+--------+-----------+------------+----------------------+-------------------+
| 02/10/ | Clinical  | Cardiology |                      |                   |
|    | Support   |            |                      |                   |
+--------+-----------+------------+----------------------+-------------------+
| / | Office    | Cardiology |   Tonia Wilson,    |                   |
|    | Visit     |            | ARNP  [address]   |                   |
 
|        |           |            | St  [place], [place]  |                   |
|        |           |            | [address]  [phone]  |                   |
|        |           |            |  [phone] (Fax)  |                   |
+--------+-----------+------------+----------------------+-------------------+
| / | Off-Site  | Nephrology |   Marzena Moser  |                   |
|    | Visit     |            | DO ETIENNE  [address] [place] [address]      |                   |
|        |           |            | [address]      |                   |
|        |           |            | [place]      |                   |
|        |           |            | [address]  [phone]  |                   |
|        |           |            |  [phone] (Fax)  |                   |
+--------+-----------+------------+----------------------+-------------------+
 documented as of this encounter
 
 Procedures
 
 
+------------------+--------+------------+----------------------+----------------------+
| Procedure Name   | Priori | Date/Time  | Associated Diagnosis | Comments             |
|                  | ty     |            |                      |                      |
+------------------+--------+------------+----------------------+----------------------+
| EXTERNAL LAB:    | Routin | 2017 |                      |   Results for this   |
| URINALYSIS       | e      |            |                      | procedure are in the |
|                  |        |            |                      |  results section.    |
+------------------+--------+------------+----------------------+----------------------+
| URINALYSIS WITH  | Routin | 2017 |                      |   Results for this   |
| MICROSCOPIC      | e      |            |                      | procedure are in the |
|                  |        |            |                      |  results section.    |
+------------------+--------+------------+----------------------+----------------------+
 documented in this encounter
 
 Results
 Urinalysis With Microscopic (2017)
 
+-------------+----------+---------------+------------+--------------+
| Component   | Value    | Ref Range     | Performed  | Pathologist  |
|             |          |               | At         | Signature    |
+-------------+----------+---------------+------------+--------------+
| WBC UA      | 50       | /HPF          |            |              |
+-------------+----------+---------------+------------+--------------+
| BACTERIA UA | Negative | Negative /HPF |            |              |
+-------------+----------+---------------+------------+--------------+
| EPITHELIAL  | 0-2      | 0 - 2 /LPF    |            |              |
| CASTS UA    |          |               |            |              |
+-------------+----------+---------------+------------+--------------+
 
 
 
+-----------------+
| Specimen        |
+-----------------+
| Urine specimen  |
| (specimen)      |
+-----------------+
 External Lab: Urinalysis (2017)
 
+-------------+----------+-----------+------------+--------------+
| Component   | Value    | Ref Range | Performed  | Pathologist  |
|             |          |           | At         | Signature    |
+-------------+----------+-----------+------------+--------------+
| UA Blood,   | Negative |           | EXTERNAL   |              |
 
| External    |          |           | LAB        |              |
+-------------+----------+-----------+------------+--------------+
| UA Glucose, | normal   |           | EXTERNAL   |              |
|  External   |          |           | LAB        |              |
+-------------+----------+-----------+------------+--------------+
| UA Ketones, | negative |           | EXTERNAL   |              |
|  External   |          |           | LAB        |              |
+-------------+----------+-----------+------------+--------------+
| UA Ph,      | 5        |           | EXTERNAL   |              |
| External    |          |           | LAB        |              |
+-------------+----------+-----------+------------+--------------+
| UA          | normal   |           | EXTERNAL   |              |
| Proteins,   |          |           | LAB        |              |
| External    |          |           |            |              |
+-------------+----------+-----------+------------+--------------+
| UA RBC,     | 0        |           | EXTERNAL   |              |
| External    |          |           | LAB        |              |
+-------------+----------+-----------+------------+--------------+
| UA Specific | 1.012    |           | EXTERNAL   |              |
|  Gravity,   |          |           | LAB        |              |
| External    |          |           |            |              |
+-------------+----------+-----------+------------+--------------+
| UA          | 100      |           | EXTERNAL   |              |
| Leukocyte   |          |           | LAB        |              |
| Esterase,   |          |           |            |              |
| External    |          |           |            |              |
+-------------+----------+-----------+------------+--------------+
 
 
 
+--------------------------+
| Resulting Agency Comment |
+--------------------------+
|   [facility]              |
+--------------------------+
 
 
 
+----------------+---------+--------------------+--------------+
| Performing     | Address | City/State/Zipcode | Phone Number |
| Organization   |         |                    |              |
+----------------+---------+--------------------+--------------+
|   EXTERNAL LAB |         |                    |              |
+----------------+---------+--------------------+--------------+
 documented in this encounter
 
 Visit Diagnoses
 Not on filedocumented in this encounter

## 2020-01-08 NOTE — XMS
Encounter Summary
  Created on: 2020
 
 Viviana Ricci
 External Reference #: [id number]
 : 47
 Sex: Female
 
 Demographics
 
 
+-----------------------+----------------------+
| Address               | [address]      |
|                       | [place]  [address] |
+-----------------------+----------------------+
| Home Phone            | [phone]      |
+-----------------------+----------------------+
| Preferred Language    | Unknown              |
+-----------------------+----------------------+
| Marital Status        | Single               |
+-----------------------+----------------------+
| Mandaeism Affiliation | [id number]                 |
+-----------------------+----------------------+
| Race                  | Unknown              |
+-----------------------+----------------------+
| Ethnic Group          | Unknown              |
+-----------------------+----------------------+
 
 
 Author
 
 
+--------------+--------------------------------------------+
| Author       | [facility]  |
|              | and [place]                                |
+--------------+--------------------------------------------+
| Organization | [facility]  |
|              | and [place]                                |
+--------------+--------------------------------------------+
| Address      | Unknown                                    |
+--------------+--------------------------------------------+
| Phone        | Unavailable                                |
+--------------+--------------------------------------------+
 
 
 
 Support
 
 
+----------------+--------------+---------------------+-----------------+
| Name           | Relationship | Address             | Phone           |
+----------------+--------------+---------------------+-----------------+
| Ada/Ed Radhames | ECON         | [address]              | [phone] |
|                |              | [place]  |                 |
|                |              |  [address]              |                 |
+----------------+--------------+---------------------+-----------------+
 
 
 
 
 Care Team Providers
 
 
+-----------------------+------+-------------+
| Care Team Member Name | Role | Phone       |
+-----------------------+------+-------------+
 PCP  | Unavailable |
+-----------------------+------+-------------+
 
 
 
 Encounter Details
 
 
+--------+-------------+---------------------+----------------------+----------------------+
| Date   | Type        | Department          | Care Team            | Description          |
+--------+-------------+---------------------+----------------------+----------------------+
| / | Orders Only |   [facility]         |   Marzena Moser  | UTI (urinary tract   |
|    |             | NEPHROLOGY  [address]   | M, DO  [address]      | infection) (Primary  |
|        |             | [address]   | [address]      | Dx)                  |
|        |             | [place], [place]     | [place], [place]      |                      |
|        |             | [address]          | [address]  [phone]  |                      |
|        |             | [phone]        |  [phone] (Fax)  |                      |
+--------+-------------+---------------------+----------------------+----------------------+
 
 
 
 Social History
 
 
+--------------+-------+-----------+--------+------+
| Tobacco Use  | Types | Packs/Day | Years  | Date |
|              |       |           | Used   |      |
+--------------+-------+-----------+--------+------+
| Never Smoker |       |           |        |      |
+--------------+-------+-----------+--------+------+
 
 
 
+---------------------+---+---+---+
| Smokeless Tobacco:  |   |   |   |
| Never Used          |   |   |   |
+---------------------+---+---+---+
 
 
 
+-------------+-------------+---------+----------+
| Alcohol Use | Drinks/Week | oz/Week | Comments |
+-------------+-------------+---------+----------+
| No          |             |         |          |
+-------------+-------------+---------+----------+
 
 
 
+------------------+---------------+
| Sex Assigned at  | Date Recorded |
| Birth            |               |
+------------------+---------------+
| Not on file      |               |
 
+------------------+---------------+
 
 
 
+----------------+-------------+-------------+
| Job Start Date | Occupation  | Industry    |
+----------------+-------------+-------------+
| Not on file    | Not on file | Not on file |
+----------------+-------------+-------------+
 
 
 
+----------------+--------------+------------+
| Travel History | Travel Start | Travel End |
+----------------+--------------+------------+
 
 
 
+-------------------------------------+
| No recent travel history available. |
+-------------------------------------+
 documented as of this encounter
 
 Progress Notes
 Cherelle Julian RN - 2013 10:40 AM PSTPatient called c/o UTI.  She did labs an
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  Per Dr Moser, roly durant should take Cipro 500 mg bid for 10 days.  Patient notified and agreed to this plan.  E
lectronically signed by Cherelle Julian RN at 2013 11:29 AM PSTdocumented in thi
s encounter
 
 Plan of Treatment
 
 
+--------+-----------+------------+----------------------+-------------------+
| Date   | Type      | Specialty  | Care Team            | Description       |
+--------+-----------+------------+----------------------+-------------------+
| / | Office    | Cardiology |   Tonia Wilson,    |                   |
|    | Visit     |            | [address]   |                   |
|        |           |            | [address] [place] [facility] [place]  |                   |
|        |           |            | [id number] [phone]  |                   |
|        |           |            |  [phone] (Fax)  |                   |
+--------+-----------+------------+----------------------+-------------------+
| / | Hospital  | Radiology  |   Popeye Townsend, |                   |
2020   | Encounter |            |  MD  [address] |                   |
|        |           |            |  St.  [place],   |                   |
|        |           |            | [place] [address]             |                   |
|        |           |            | [phone]         |                   |
|        |           |            | [phone] (Fax)   |                   |
+--------+-----------+------------+----------------------+-------------------+
| / | Surgery   | Radiology  |   Popeye Townsend, | CV EP PPM SYSTEM  |
|    |           |            |  MD  [address] | IMPLANT           |
|        |           |            |  St.  [place],   |                   |
|        |           |            | [place] [address]             |                   |
|        |           |            | [phone]         |                   |
|        |           |            | [phone] (Fax)   |                   |
+--------+-----------+------------+----------------------+-------------------+
| 02/10/ | Clinical  | Cardiology |                      |                   |
2020   | Support   |            |                      |                   |
+--------+-----------+------------+----------------------+-------------------+
| / | Office    | Cardiology |   Tonia Wilson,    |                   |
 
|    | Visit     |            | ARN  [address]   |                   |
|        |           |            |   [place]  |                   |
|        |           |            | [address]  [phone]  |                   |
|        |           |            |  [phone] (Fax)  |                   |
+--------+-----------+------------+----------------------+-------------------+
| / | Off-Site  | Nephrology |   Marzena Moser  |                   |
|    | Visit     |            | DO ETIENNE  [address] [place] [address]      |                   |
|        |           |            | [address]      |                   |
|        |           |            | [place]      |                   |
|        |           |            | [address]  [phone]  |                   |
|        |           |            |  [phone] (Fax)  |                   |
+--------+-----------+------------+----------------------+-------------------+
 documented as of this encounter
 
 Visit Diagnoses
 
 
+----------------------------------------------------------------------------------------+
| Diagnosis                                                                              |
+----------------------------------------------------------------------------------------+
|   UTI (urinary tract infection) - Primary  Urinary tract infection, site not specified |
+----------------------------------------------------------------------------------------+
 documented in this encounter

## 2020-01-08 NOTE — XMS
Encounter Summary
  Created on: 2020
 
 Viviana Ricci
 External Reference #: [id number]
 : 47
 Sex: Female
 
 Demographics
 
 
+-----------------------+----------------------+
| Address               | [address]      |
|                       | [place]  [address] |
+-----------------------+----------------------+
| Home Phone            | [phone]      |
+-----------------------+----------------------+
| Preferred Language    | Unknown              |
+-----------------------+----------------------+
| Marital Status        | Single               |
+-----------------------+----------------------+
| Jew Affiliation | 1009                 |
+-----------------------+----------------------+
| Race                  | Unknown              |
+-----------------------+----------------------+
| Ethnic Group          | Unknown              |
+-----------------------+----------------------+
 
 
 Author
 
 
+--------------+--------------------------------------------+
| Author       | [facility]  |
|              | and [place]                                |
+--------------+--------------------------------------------+
| Organization | [facility]  |
|              | and [place]                                |
+--------------+--------------------------------------------+
| Address      | Unknown                                    |
+--------------+--------------------------------------------+
| Phone        | Unavailable                                |
+--------------+--------------------------------------------+
 
 
 
 Support
 
 
+----------------+--------------+---------------------+-----------------+
| Name           | Relationship | Address             | Phone           |
+----------------+--------------+---------------------+-----------------+
| Ada/Ed Radhames | ECON         | [address]              | [phone] |
|                |              | [place]  |                 |
|                |              |  [address]              |                 |
+----------------+--------------+---------------------+-----------------+
 
 
 
 
 Care Team Providers
 
 
+-----------------------+------+-------------+
| Care Team Member Name | Role | Phone       |
+-----------------------+------+-------------+
 PCP  | Unavailable |
+-----------------------+------+-------------+
 
 
 
 Reason for Visit
 
 
+-------------------+----------+
| Reason            | Comments |
+-------------------+----------+
| Medication Refill |          |
+-------------------+----------+
 
 
 
 Encounter Details
 
 
+--------+--------+---------------------+----------------------+-------------------+
| Date   | Type   | Department          | Care Team            | Description       |
+--------+--------+---------------------+----------------------+-------------------+
| / | Refill |   [place]         |   Marzena Moser  | Medication Refill |
|    |        | NEPHROLOGY  [address]   | M, DO  [address]      |                   |
|        |        | [address]   | [address]      |                   |
|        |        | [place], [place]     | [place], [place]      |                   |
|        |        | [address]          | [address]  [phone]  |                   |
|        |        | [phone]        |  [phone] (Fax)  |                   |
+--------+--------+---------------------+----------------------+-------------------+
 
 
 
 Social History
 
 
+--------------+-------+-----------+--------+------+
| Tobacco Use  | Types | Packs/Day | Years  | Date |
|              |       |           | Used   |      |
+--------------+-------+-----------+--------+------+
| Never Smoker |       |           |        |      |
+--------------+-------+-----------+--------+------+
 
 
 
+---------------------+---+---+---+
| Smokeless Tobacco:  |   |   |   |
| Never Used          |   |   |   |
+---------------------+---+---+---+
 
 
 
+---------------------------------------------------------------+
| Comments: some second hand smoke exposure, but fairly minimal |
 
+---------------------------------------------------------------+
 
 
 
+-------------+-------------+---------+----------+
| Alcohol Use | Drinks/Week | oz/Week | Comments |
+-------------+-------------+---------+----------+
| No          |             |         |          |
+-------------+-------------+---------+----------+
 
 
 
+------------------+---------------+
| Sex Assigned at  | Date Recorded |
| Birth            |               |
+------------------+---------------+
| Not on file      |               |
+------------------+---------------+
 
 
 
+----------------+-------------+-------------+
| Job Start Date | Occupation  | Industry    |
+----------------+-------------+-------------+
| Not on file    | Not on file | Not on file |
+----------------+-------------+-------------+
 
 
 
+----------------+--------------+------------+
| Travel History | Travel Start | Travel End |
+----------------+--------------+------------+
 
 
 
+-------------------------------------+
| No recent travel history available. |
+-------------------------------------+
 documented as of this encounter
 
 Plan of Treatment
 
 
+--------+-----------+------------+----------------------+-------------------+
| Date   | Type      | Specialty  | Care Team            | Description       |
+--------+-----------+------------+----------------------+-------------------+
| / | Office    | Cardiology |   HellalfredoTonia,    |                   |
|    | Visit     |            | ARNP  [address]   |                   |
|        |           |            | St  [place], [place]  |                   |
|        |           |            | [address]  [phone]  |                   |
|        |           |            |  [phone] (Fax)  |                   |
+--------+-----------+------------+----------------------+-------------------+
| / | Hospital  | Radiology  |   Popeye Townsend, |                   |
|    | Encounter |            |  MD  [address] |                   |
|        |           |            |  St.  [place],   |                   |
|        |           |            | [place] [address]             |                   |
|        |           |            | [phone]         |                   |
|        |           |            | [phone] (Fax)   |                   |
+--------+-----------+------------+----------------------+-------------------+
| / | Surgery   | Radiology  |   Popeye Townsend, | CV EP PPM SYSTEM  |
 
|    |           |            |  MD  [address] | IMPLANT           |
|        |           |            |  St.  [place],   |                   |
|        |           |            | [place] [address]             |                   |
|        |           |            | [phone]         |                   |
|        |           |            | [phone] (Fax)   |                   |
+--------+-----------+------------+----------------------+-------------------+
| 02/10/ | Clinical  | Cardiology |                      |                   |
|    | Support   |            |                      |                   |
+--------+-----------+------------+----------------------+-------------------+
| / | Office    | Cardiology |   Tonia Wilson,    |                   |
|    | Visit     |            | ARNP  [address]   |                   |
|        |           |            | [place]  |                   |
|        |           |            | [address]  [phone]  |                   |
|        |           |            |  [phone] (Fax)  |                   |
+--------+-----------+------------+----------------------+-------------------+
| / | Off-Site  | Nephrology |   Marzena Moser  |                   |
|    | Visit     |            | DO ETIENNE  [address] [place] [address]      |                   |
|        |           |            | [address]      |                   |
|        |           |            | [place]      |                   |
|        |           |            | [address]  [phone]  |                   |
|        |           |            |  [phone] (Fax)  |                   |
+--------+-----------+------------+----------------------+-------------------+
 documented as of this encounter
 
 Visit Diagnoses
 Not on filedocumented in this encounter

## 2020-01-08 NOTE — XMS
Encounter Summary
  Created on: 2020
 
 Viviana Ricci
 External Reference #: [id number]
 : 47
 Sex: Female
 
 Demographics
 
 
+-----------------------+----------------------+
| Address               | [address]      |
|                       | [place]  [address] |
+-----------------------+----------------------+
| Home Phone            | [phone]      |
+-----------------------+----------------------+
| Preferred Language    | Unknown              |
+-----------------------+----------------------+
| Marital Status        | Single               |
+-----------------------+----------------------+
| Yazidi Affiliation | [id number]                 |
+-----------------------+----------------------+
| Race                  | Unknown              |
+-----------------------+----------------------+
| Ethnic Group          | Unknown              |
+-----------------------+----------------------+
 
 
 Author
 
 
+--------------+--------------------------------------------+
| Author       | [facility]  |
|              | and [facility]                                |
+--------------+--------------------------------------------+
| Organization | [facility]  |
|              | and [facility]                                |
+--------------+--------------------------------------------+
| Address      | Unknown                                    |
+--------------+--------------------------------------------+
| Phone        | Unavailable                                |
+--------------+--------------------------------------------+
 
 
 
 Support
 
 
+----------------+--------------+---------------------+-----------------+
| Name           | Relationship | Address             | Phone           |
+----------------+--------------+---------------------+-----------------+
| Ada/Ed Radhames | ECON         | [address]              | [phone] |
|                |              | [place], [place]  |                 |
|                |              |  [id number]              |                 |
+----------------+--------------+---------------------+-----------------+
 
 
 
 
 Care Team Providers
 
 
+-----------------------+------+-------------+
| Care Team Member Name | Role | Phone       |
+-----------------------+------+-------------+
 PCP  | Unavailable |
+-----------------------+------+-------------+
 
 
 
 Reason for Visit
 Evaluate & Treat (Routine)
 
+--------+--------+------------+--------------+--------------+---------------+
| Status | Reason | Specialty  | Diagnoses /  | Referred By  | Referred To   |
|        |        |            | Procedures   | Contact      | Contact       |
+--------+--------+------------+--------------+--------------+---------------+
| Closed |        | Nephrology |   Diagnoses  |   Stroemel,  |   Stroemel,   |
|        |        |            |              | Marzena CLIFTON,   | Marzena CLIFTON DO |
|        |        |            | Complication | DO  [address] |   [address]    |
|        |        |            | s of         |  [address] | [address]   |
|        |        |            | transplanted |  [address]  [place]  | [address]  [place]    |
|        |        |            |  kidney      | [place]    | [place]     |
|        |        |            | Unspecified  | [address]        | [address]  Phone: |
|        |        |            | hypertensive | Phone:       |  [phone] |
|        |        |            |  kidney      | [phone] |   Fax:        |
|        |        |            | disease with |   Fax:       | [phone]  |
|        |        |            |  chronic     | [phone] |               |
|        |        |            | kidney       |              |               |
|        |        |            | disease      |              |               |
|        |        |            | stage I      |              |               |
|        |        |            | through      |              |               |
|        |        |            | stage IV, or |              |               |
|        |        |            |              |              |               |
|        |        |            | unspecified( |              |               |
|        |        |            | 403.90)      |              |               |
|        |        |            | Chronic      |              |               |
|        |        |            | glomerulonep |              |               |
|        |        |            | hritis with  |              |               |
|        |        |            | lesion of    |              |               |
|        |        |            | membranous   |              |               |
|        |        |            | glomerulonep |              |               |
|        |        |            | hritis       |              |               |
|        |        |            | Unspecified  |              |               |
|        |        |            | essential    |              |               |
|        |        |            | hypertension |              |               |
|        |        |            |   Procedures |              |               |
|        |        |            |   [place] [facility]  |              |               |
|        |        |            | OUTPATIENT   |              |               |
|        |        |            | VISIT 25     |              |               |
|        |        |            | MINUTES      |              |               |
+--------+--------+------------+--------------+--------------+---------------+
 
 
 
 
 Encounter Details
 
 
 
+--------+-----------+---------------------+----------------------+----------------------+
| Date   | Type      | Department          | Care Team            | Description          |
+--------+-----------+---------------------+----------------------+----------------------+
| / | Off-Site  |   [facility]         |   Marzena Moser  | Essential            |
| 2015   | Visit     | NEPHROLOGY  [address]   | M, DO  [address]      | hypertension         |
|        |           | [address]   | [address]      | (Primary Dx); Renal  |
|        |           | [place], [place]     | [place]      | transplant           |
|        |           | [address]          | [address]  [phone]  | recipient;           |
|        |           | [phone]        |  [phone] (Fax)  | Hypothyroidism due   |
|        |           |                     |                      | to acquired atrophy  |
|        |           |                     |                      | of thyroid;          |
|        |           |                     |                      | Hyperlipidemia       |
+--------+-----------+---------------------+----------------------+----------------------+
 
 
 
 Social History
 
 
+--------------+-------+-----------+--------+------+
| Tobacco Use  | Types | Packs/Day | Years  | Date |
|              |       |           | Used   |      |
+--------------+-------+-----------+--------+------+
| Never Smoker |       |           |        |      |
+--------------+-------+-----------+--------+------+
 
 
 
+---------------------+---+---+---+
| Smokeless Tobacco:  |   |   |   |
| Never Used          |   |   |   |
+---------------------+---+---+---+
 
 
 
+---------------------------------------------------------------+
| Comments: some second hand smoke exposure, but fairly minimal |
+---------------------------------------------------------------+
 
 
 
+-------------+-------------+---------+----------+
| Alcohol Use | Drinks/Week | oz/Week | Comments |
+-------------+-------------+---------+----------+
| No          |             |         |          |
+-------------+-------------+---------+----------+
 
 
 
+------------------+---------------+
| Sex Assigned at  | Date Recorded |
| Birth            |               |
+------------------+---------------+
| Not on file      |               |
+------------------+---------------+
 
 
 
+----------------+-------------+-------------+
 
| Job Start Date | Occupation  | Industry    |
+----------------+-------------+-------------+
| Not on file    | Not on file | Not on file |
+----------------+-------------+-------------+
 
 
 
+----------------+--------------+------------+
| Travel History | Travel Start | Travel End |
+----------------+--------------+------------+
 
 
 
+-------------------------------------+
| No recent travel history available. |
+-------------------------------------+
 documented as of this encounter
 
 Last Filed Vital Signs
 
 
+-------------------+----------------------+----------------------+----------+
| Vital Sign        | Reading              | Time Taken           | Comments |
+-------------------+----------------------+----------------------+----------+
| Blood Pressure    | 140/82               | 2015  1:33 PM  |          |
|                   |                      | PDT                  |          |
+-------------------+----------------------+----------------------+----------+
| Pulse             | -                    | -                    |          |
+-------------------+----------------------+----------------------+----------+
| Temperature       | 35   C (95   F)      | 2015  1:18 PM  |          |
|                   |                      | PDT                  |          |
+-------------------+----------------------+----------------------+----------+
| Respiratory Rate  | -                    | -                    |          |
+-------------------+----------------------+----------------------+----------+
| Oxygen Saturation | -                    | -                    |          |
+-------------------+----------------------+----------------------+----------+
| Inhaled Oxygen    | -                    | -                    |          |
| Concentration     |                      |                      |          |
+-------------------+----------------------+----------------------+----------+
| Weight            | 125.8 kg (277 lb 5.4 | 2015  1:18 PM  |          |
|                   |  oz)                 | PDT                  |          |
+-------------------+----------------------+----------------------+----------+
| Height            | -                    | -                    |          |
+-------------------+----------------------+----------------------+----------+
| Body Mass Index   | 44.76                | 2014 12:58 PM  |          |
|                   |                      | PST                  |          |
+-------------------+----------------------+----------------------+----------+
 documented in this encounter
 
 Progress Marzena Tamayo DO - 2015 10:55 AM PDTFormatting of this note might be different
 from the original.
 Subjective:  NEPHROLOGY 
  
 Patient ID: Viviana Ricci is a 69 y.o. female.
 
 HPI Comments: 
 Followup for this 68 YO  white female s/p renal allograft, 05, with remote  allograft 
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
demia, SHPTH,  previous low back pain, obesity/JACK, chronic pulmonary  hypertension, histori
 
izabela related to centripetal obesity, and  CAD, s/p CABG x3 vessels,. 
  
 She states that she developed a DVT in her left leg in  and is now on apixaban
 for this.  She denies increased edema, chest pain, or dyspnea.
 
 MEDS:
 
 Prograf 1.5 mg, BID
 Mycophenolate 250 mg, TID.
 Prednisone 5 mg, daily.
 Outpatient Prescriptions Marked as Taking for the 8/3/15 encounter (Off-Site Visit) with Maye Moser, DO 
 Medication Sig Dispense Refill 
   allopurinol (ZYLOPRIM) 100 mg tablet Take 1 tablet by mouth Daily. 30 tablet 11 
   apixaban (ELIQUIS) 2.5 mg tablet Take 1 tablet by mouth 2 times daily. 60 tablet 6 
   aspirin 81 MG EC tablet Take 81 mg by mouth Daily.   
   cholecalciferol (VITAMIN D-3) 2000 UNITS TABS Take 5,000 Units by mouth Every other day
.   
   cinacalcet (SENSIPAR) 30 mg tablet Take 1 tablet by mouth Daily. 30 tablet 12 
   fludrocortisone (FLORINEF) 0.1 mg tablet Take 1 tablet by mouth Every other day. 45 tab
let 2 
   fluticasone (FLOVENT HFA) 220 mcg/puff inhaler Inhale 1 puff into the lungs 2 times shante
ly. Rinse mouth after use. 1 Inhaler 11 
   furosemide (LASIX) 40 mg tablet Take 1 tablet by mouth Daily as needed. 30 tablet 5 
   glucose blood VI test strips (ONE TOUCH ULTRA TEST) strip Check blood sugar before each
 meal and as directed 100 each 12 
   insulin glargine (LANTUS) 100 units/mL injection Inject 20 Units under the skin every m
orning. 10 mL 11 
   insulin lispro (HUMALOG) 100 units/mL injection Inject subcutaneously before meals acco
rding to sliding scale 10 vial 11 
   Insulin Syringe-Needle U-100 (BD INSULIN SYRINGE ULTRAFINE) 31G X 5/16" 0.5 ML MISC Use
 before meals and as directed. 100 each 11 
   levothyroxine (LEVOTHROID) 50 mcg tablet Take 1 tablet by mouth Daily. 30 tablet 11 
   lisinopril (PRINIVIL,ZESTRIL) 30 MG tablet Take 1 tablet by mouth Daily. 90 tablet 3 
   loperamide (ANTI-DIARRHEAL) 2 mg capsule Take 1 capsule by mouth 4 times daily as neede
d. 60 capsule 5 
   losartan (COZAAR) 50 mg tablet Take 1 tablet by mouth Daily. 90 tablet 4 
   magnesium oxide (MAG-OX) 400 mg tablet Take 1 tablet by mouth 2 times daily. 62 tablet 
12 
   metoprolol tartrate (LOPRESSOR) 25 mg tablet Take 1 tablet by mouth 2 times daily. 60 t
ablet 11 
   omeprazole (PRILOSEC) 20 mg capsule Take one capsule by mouth once daily on an empty st
omach 90 capsule 4 
   Prenatal Multivit-Min-Fe-FA (PRENATAL VITAMINS) 0.8 MG TABS Take 0.8 mg by mouth Daily.
 30 each 11 
   Respiratory Therapy Supplies MISC Decrease CPAP to 12-18 cmH2O Diagnosis Code(s)327.23.
 Please send order to [facility]. 1 each 0 
   rosuvastatin (CRESTOR) 20 mg tablet Take 1 tablet by mouth nightly. 30 tablet 11 
 
 No Known Allergies
 
 Objective:   Blood pressure 140/82, temperature 35 C (95 F), weight 125.8 kg (277 lb 5.
4 oz).
 weight = refused.
  
 Physical Exam
 
 HEENT: No thrush.
 Heart: Regular rate and rhythm, with no S3, S4, murmur or rub.
 Lungs:   CTA bilaterally, no rales or wheezes.
 
 Abdomen:  Soft, obese, the renal allograft in RLQ is nontender, normoactive bowel sounds.
 Extremities:  no clubbing, cyanosis, (+) trace edema.  No foot ulcers.
 
 Lab Results 
 Component Value Date 
  NAEX 139 2015 
  KEX 5.3* 2015 
  CLEX 110 2015 
  CO2EX 20 2015 
  BUNEX 33* 2015 
  CREEX 1.21 2015 
  EGFREX 44 2015 
  GLUEX 128* 2015 
  PHOSEX 2.8 2015 
  MGEX 1.7 2015 
  PTHEX 203.6 2015 
  XEV1LOD 7.3* 2014 
 
 Lab Results 
 Component Value Date 
  CHOLEX 156 2015 
  HDLEX 47.9 2015 
  LDLEX 69 2015 
  TRIGEX 194* 2015 
 
 Lab Results 
 Component Value Date 
  WBCEX 3.8* 2015 
  HGBEX 13.3 2015 
  HCTEX 39.9 2015 
  PLTEX 155 2015 
 
 Lab Results 
 Component Value Date 
  TACROLIMUSEX 5.5 2015 
 
 Assessment: 
 
 1.  Renal Allograft--Scr and tacrolimus levels are stable.
 2.  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
 3.  Type 2 DM, requiring insuline--good control.
 4.  Hyperlipidemia--stable on moderate intensity rosuvastatin.
 5.  Hypertension--good control.
 6.  SHPTH--stable.  
 7.  Obesity/JACK/Pulmonary hypertension-- RHF has improved last 5-6 years?
 8.  CAD, s/p CABG, --stable on ASA, metoprolol, atorvastatin.
 9.  Type IV RTA--stable.
 10. Chronic Low Back pain--in remission.
 11.   DJD, left knee--about same.
 12.  s/p DVT left leg, 2015--stable on apixaban.
 
 Plan: 
 
  
 1.  I reviewed with Viviana her recent lab and drug levels.  Her allograft fucntion, BP, and g
lycemic control appear stable on her
 current regimen.
 2.  I think her choice to utilize apixaban for DVT prevention long term, is very reasonable
. She appears to have stable tacro. levels on the current dose.
 3.   Will plan to see her back in 6 mo. at the [facility], [place]. She will continue 
 
to do her standing order , every 3 mo. also.
 
 CC:    Jose David Dalal M.D., [facility], [facility]
            Edgar Sanders MD, [facility], Orthopedics
 
 Electronically signed by Marzena Moser DO at 2015 11:26 AM PDTdocumented in thi
s encounter
 
 Plan of Treatment
 
 
+--------+-----------+------------+----------------------+-------------------+
| Date   | Type      | Specialty  | Care Team            | Description       |
+--------+-----------+------------+----------------------+-------------------+
| / | Office    | Cardiology |   Tonia Wilson,    |                   |
|    | Visit     |            | ARNJESSICA  [address]   |                   |
|        |           |            | St  [place], [place]  |                   |
|        |           |            | [address]  [phone]  |                   |
|        |           |            |  [phone] (Fax)  |                   |
+--------+-----------+------------+----------------------+-------------------+
| / | Hospital  | Radiology  |   Popeye Townsend, |                   |
|    | Encounter |            |  MD  [address] |                   |
|        |           |            |  St.  [place],   |                   |
|        |           |            | [place] [address]             |                   |
|        |           |            | [phone]         |                   |
|        |           |            | [phone] (Fax)   |                   |
+--------+-----------+------------+----------------------+-------------------+
| / | Surgery   | Radiology  |   Popeye Townsend, | CV EP PPM SYSTEM  |
|    |           |            |  MD  [address] | IMPLANT           |
|        |           |            |  St.  [place],   |                   |
|        |           |            | [place] [address]             |                   |
|        |           |            | [phone]         |                   |
|        |           |            | [phone] (Fax)   |                   |
+--------+-----------+------------+----------------------+-------------------+
| 02/10/ | Clinical  | Cardiology |                      |                   |
|    | Support   |            |                      |                   |
+--------+-----------+------------+----------------------+-------------------+
| / | Office    | Cardiology |   Tonia Wilson,    |                   |
|    | Visit     |            | [place] [address]   |                   |
|        |           |            | [place]  |                   |
|        |           |            | [address]  [phone]  |                   |
|        |           |            |  [phone] (Fax)  |                   |
+--------+-----------+------------+----------------------+-------------------+
| / | Off-Site  | Nephrology |   Marzena Moser  |                   |
2020   | Visit     |            | DO ETIENNE  [address] [place] [address]      |                   |
|        |           |            | [address]      |                   |
|        |           |            | [place]      |                   |
|        |           |            | [address]  [phone]  |                   |
|        |           |            |  [phone] (Fax)  |                   |
+--------+-----------+------------+----------------------+-------------------+
 documented as of this encounter
 
 Visit Diagnoses
 
 
+------------------------------------------------------------------------+
| Diagnosis                                                              |
+------------------------------------------------------------------------+
|   Essential hypertension - Primary  Unspecified essential hypertension |
+------------------------------------------------------------------------+
 
|   Renal transplant recipient                                           |
+------------------------------------------------------------------------+
|   Hypothyroidism due to acquired atrophy of thyroid                    |
+------------------------------------------------------------------------+
|   Hyperlipidemia  Other and unspecified hyperlipidemia                 |
+------------------------------------------------------------------------+
 documented in this encounter

## 2020-01-08 NOTE — XMS
Encounter Summary
  Created on: 2020
 
 Viviana Ricci
 External Reference #: [id number]
 : 47
 Sex: Female
 
 Demographics
 
 
+-----------------------+----------------------+
| Address               | [address]      |
|                       | [place]  [address] |
+-----------------------+----------------------+
| Home Phone            | [phone]      |
+-----------------------+----------------------+
| Preferred Language    | Unknown              |
+-----------------------+----------------------+
| Marital Status        | Single               |
+-----------------------+----------------------+
| Hindu Affiliation | [id number]                 |
+-----------------------+----------------------+
| Race                  | Unknown              |
+-----------------------+----------------------+
| Ethnic Group          | Unknown              |
+-----------------------+----------------------+
 
 
 Author
 
 
+--------------+--------------------------------------------+
| Author       | [facility]  |
|              | and [facility]                                |
+--------------+--------------------------------------------+
| Organization | [facility]  |
|              | and [facility]                                |
+--------------+--------------------------------------------+
| Address      | Unknown                                    |
+--------------+--------------------------------------------+
| Phone        | Unavailable                                |
+--------------+--------------------------------------------+
 
 
 
 Support
 
 
+----------------+--------------+---------------------+-----------------+
| Name           | Relationship | Address             | Phone           |
+----------------+--------------+---------------------+-----------------+
| Ada/Ed Radhames | ECON         | [address]              | [phone] |
|                |              | [place]  |                 |
|                |              |  [address]              |                 |
+----------------+--------------+---------------------+-----------------+
 
 
 
 
 Care Team Providers
 
 
+------------------------+------+-----------------+
| Care Team Member Name  | Role | Phone           |
+------------------------+------+-----------------+
| Marzena Moser DO | PCP  | [phone] |
+------------------------+------+-----------------+
 
 
 
 Reason for Visit
 
 
+-------------------+----------+
| Reason            | Comments |
+-------------------+----------+
| Medication Refill |          |
+-------------------+----------+
 
 
 
 Encounter Details
 
 
+--------+--------+---------------------+----------------------+-------------------+
| Date   | Type   | Department          | Care Team            | Description       |
+--------+--------+---------------------+----------------------+-------------------+
| / | Refill |   [facility]         |   Marzena Moser  | Medication Refill |
| 2018   |        | NEPHROLOGY  [address]   | M, DO  [address]      |                   |
|        |        | [address]   | [address]      |                   |
|        |        | [place], [place]     | [place], [place]      |                   |
|        |        | [address]          | [address]  [phone]  |                   |
|        |        | [phone]        |  [phone] (Fax)  |                   |
+--------+--------+---------------------+----------------------+-------------------+
 
 
 
 Social History
 
 
+--------------+-------+-----------+--------+------+
| Tobacco Use  | Types | Packs/Day | Years  | Date |
|              |       |           | Used   |      |
+--------------+-------+-----------+--------+------+
| Never Smoker |       |           |        |      |
+--------------+-------+-----------+--------+------+
 
 
 
+---------------------+---+---+---+
| Smokeless Tobacco:  |   |   |   |
| Never Used          |   |   |   |
+---------------------+---+---+---+
 
 
 
+---------------------------------------------------------------+
| Comments: some second hand smoke exposure, but fairly minimal |
 
+---------------------------------------------------------------+
 
 
 
+-------------+-------------+---------+----------+
| Alcohol Use | Drinks/Week | oz/Week | Comments |
+-------------+-------------+---------+----------+
| No          |             |         |          |
+-------------+-------------+---------+----------+
 
 
 
+------------------+---------------+
| Sex Assigned at  | Date Recorded |
| Birth            |               |
+------------------+---------------+
| Not on file      |               |
+------------------+---------------+
 
 
 
+----------------+-------------+-------------+
| Job Start Date | Occupation  | Industry    |
+----------------+-------------+-------------+
| Not on file    | Not on file | Not on file |
+----------------+-------------+-------------+
 
 
 
+----------------+--------------+------------+
| Travel History | Travel Start | Travel End |
+----------------+--------------+------------+
 
 
 
+-------------------------------------+
| No recent travel history available. |
+-------------------------------------+
 documented as of this encounter
 
 Plan of Treatment
 
 
+--------+-----------+------------+----------------------+-------------------+
| Date   | Type      | Specialty  | Care Team            | Description       |
+--------+-----------+------------+----------------------+-------------------+
| / | Office    | Cardiology |   Tonia Wilson,    |                   |
|    | Visit     |            | ARNP  [address]   |                   |
|        |           |            | [place], [place]  |                   |
|        |           |            | [address]  [phone]  |                   |
|        |           |            |  [phone] (Fax)  |                   |
+--------+-----------+------------+----------------------+-------------------+
| / | Hospital  | Radiology  |   Popeye Townsend, |                   |
|    | Encounter |            |  MD  [address] |                   |
|        |           |            |  [place],   |                   |
|        |           |            | [place] [address]             |                   |
|        |           |            | [phone]         |                   |
|        |           |            | [phone] (Fax)   |                   |
+--------+-----------+------------+----------------------+-------------------+
| / | Surgery   | Radiology  |   Popeye Townsend, | CV EP PPM SYSTEM  |
 
|    |           |            |  MD  [address] | IMPLANT           |
|        |           |            |  [place],   |                   |
|        |           |            | [place] [address]             |                   |
|        |           |            | [phone]         |                   |
|        |           |            | [phone] (Fax)   |                   |
+--------+-----------+------------+----------------------+-------------------+
| 02/10/ | Clinical  | Cardiology |                      |                   |
|    | Support   |            |                      |                   |
+--------+-----------+------------+----------------------+-------------------+
| / | Office    | Cardiology |   Tonia Wilson,    |                   |
|    | Visit     |            | ARNP  [address]   |                   |
|        |           |            | [place]  |                   |
|        |           |            | [id number] [phone]  |                   |
|        |           |            |  [phone] (Fax)  |                   |
+--------+-----------+------------+----------------------+-------------------+
| / | Off-Site  | Nephrology |   Marzena Moser  |                   |
|    | Visit     |            | DO [facility]  [address] [place] [address]      |                   |
|        |           |            | [address]      |                   |
|        |           |            | [place]      |                   |
|        |           |            | [address] [phone]  |                   |
|        |           |            |  [phone] (Fax)  |                   |
+--------+-----------+------------+----------------------+-------------------+
 documented as of this encounter
 
 Visit Diagnoses
 
 
+---------------------------------+
| Diagnosis                       |
+---------------------------------+
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
+---------------------------------+
 documented in this encounter

## 2020-01-08 NOTE — XMS
Encounter Summary
  Created on: 2020
 
 Viviana Ricci
 External Reference #: [id number]
 : 47
 Sex: Female
 
 Demographics
 
 
+-----------------------+----------------------+
| Address               | [address]      |
|                       | [place]  [address] |
+-----------------------+----------------------+
| Home Phone            | [phone]      |
+-----------------------+----------------------+
| Preferred Language    | Unknown              |
+-----------------------+----------------------+
| Marital Status        | Single               |
+-----------------------+----------------------+
| Latter day Affiliation | [id number]                 |
+-----------------------+----------------------+
| Race                  | Unknown              |
+-----------------------+----------------------+
| Ethnic Group          | Unknown              |
+-----------------------+----------------------+
 
 
 Author
 
 
+--------------+--------------------------------------------+
| Author       | [facility]  |
|              | and [facility]                                |
+--------------+--------------------------------------------+
| Organization | [facility]  |
|              | and [place]                                |
+--------------+--------------------------------------------+
| Address      | Unknown                                    |
+--------------+--------------------------------------------+
| Phone        | Unavailable                                |
+--------------+--------------------------------------------+
 
 
 
 Support
 
 
+----------------+--------------+---------------------+-----------------+
| Name           | Relationship | Address             | Phone           |
+----------------+--------------+---------------------+-----------------+
| Ada/Ed Radhames | ECON         | [address]              | [phone] |
|                |              | [place]  |                 |
|                |              |  [id number]              |                 |
+----------------+--------------+---------------------+-----------------+
 
 
 
 
 Care Team Providers
 
 
+-----------------------+------+-------------+
| Care Team Member Name | Role | Phone       |
+-----------------------+------+-------------+
 PCP  | Unavailable |
+-----------------------+------+-------------+
 
 
 
 Encounter Details
 
 
+--------+----------+---------------------+----------------------+-------------+
| Date   | Type     | Department          | Care Team            | Description |
+--------+----------+---------------------+----------------------+-------------+
| / | Abstract |   [facility]  [place]         |   Marzena Moser  |             |
|    |          | NEPHROLOGY  [address]   | M, DO  [address]      |             |
|        |          | [address]   | [address]      |             |
|        |          | [place], [place]     | [place]      |             |
|        |          | [address]          | [address]  [phone]  |             |
|        |          | [phone]        |  [phone] (Fax)  |             |
+--------+----------+---------------------+----------------------+-------------+
 
 
 
 Social History
 
 
+--------------+-------+-----------+--------+------+
| Tobacco Use  | Types | Packs/Day | Years  | Date |
|              |       |           | Used   |      |
+--------------+-------+-----------+--------+------+
| Never Smoker |       |           |        |      |
+--------------+-------+-----------+--------+------+
 
 
 
+---------------------+---+---+---+
| Smokeless Tobacco:  |   |   |   |
| Never Used          |   |   |   |
+---------------------+---+---+---+
 
 
 
+---------------------------------------------------------------+
| Comments: some second hand smoke exposure, but fairly minimal |
+---------------------------------------------------------------+
 
 
 
+-------------+-------------+---------+----------+
| Alcohol Use | Drinks/Week | oz/Week | Comments |
+-------------+-------------+---------+----------+
| No          |             |         |          |
+-------------+-------------+---------+----------+
 
 
 
 
+------------------+---------------+
| Sex Assigned at  | Date Recorded |
| Birth            |               |
+------------------+---------------+
| Not on file      |               |
+------------------+---------------+
 
 
 
+----------------+-------------+-------------+
| Job Start Date | Occupation  | Industry    |
+----------------+-------------+-------------+
| Not on file    | Not on file | Not on file |
+----------------+-------------+-------------+
 
 
 
+----------------+--------------+------------+
| Travel History | Travel Start | Travel End |
+----------------+--------------+------------+
 
 
 
+-------------------------------------+
| No recent travel history available. |
+-------------------------------------+
 documented as of this encounter
 
 Plan of Treatment
 
 
+--------+-----------+------------+----------------------+-------------------+
| Date   | Type      | Specialty  | Care Team            | Description       |
+--------+-----------+------------+----------------------+-------------------+
| / | Office    | Cardiology |   Tonia Wilson,    |                   |
|    | Visit     |            | ARNJESSICA  [address]   |                   |
|        |           |            | [place]  |                   |
|        |           |            | [address]  [phone]  |                   |
|        |           |            |  [phone] (Fax)  |                   |
+--------+-----------+------------+----------------------+-------------------+
| / | Hospital  | Radiology  |   Popeye Townsend, |                   |
|    | Encounter |            |  MD  [address] |                   |
|        |           |            |  St.  [place],   |                   |
|        |           |            | [place] [address]             |                   |
|        |           |            | [phone]         |                   |
|        |           |            | [phone] (Fax)   |                   |
+--------+-----------+------------+----------------------+-------------------+
| / | Surgery   | Radiology  |   Popeye Townsend, | CV EP PPM SYSTEM  |
|    |           |            |  MD  [address] | IMPLANT           |
|        |           |            |  St.  [place],   |                   |
|        |           |            | [place] [address]             |                   |
|        |           |            | [phone]         |                   |
|        |           |            | [phone] (Fax)   |                   |
+--------+-----------+------------+----------------------+-------------------+
| 02/10/ | Clinical  | Cardiology |                      |                   |
|    | Support   |            |                      |                   |
+--------+-----------+------------+----------------------+-------------------+
| / | Office    | Cardiology |   Tonia Wilson,    |                   |
|    | Visit     |            | ARNP  [address]   |                   |
 
|        |           |            | St  [place], [place]  |                   |
|        |           |            | [address]  [phone]  |                   |
|        |           |            |  [phone] (Fax)  |                   |
+--------+-----------+------------+----------------------+-------------------+
| / | Off-Site  | Nephrology |   Marzena Moser  |                   |
|    | Visit     |            | DO ETIENNE  [address] [place] [address]      |                   |
|        |           |            | [address]      |                   |
|        |           |            | [place]      |                   |
|        |           |            | [address]  [phone]  |                   |
|        |           |            |  [phone] (Fax)  |                   |
+--------+-----------+------------+----------------------+-------------------+
 documented as of this encounter
 
 Procedures
 
 
+--------------------+--------+------------+----------------------+----------------------+
| Procedure Name     | Priori | Date/Time  | Associated Diagnosis | Comments             |
|                    | ty     |            |                      |                      |
+--------------------+--------+------------+----------------------+----------------------+
| EXTERNAL LAB: BILLY  | Routin | 2014 |                      |   Results for this   |
|                    | e      |            |                      | procedure are in the |
|                    |        |            |                      |  results section.    |
+--------------------+--------+------------+----------------------+----------------------+
| EXTERNAL LAB: AST  | Routin | 2014 |                      |   Results for this   |
|                    | e      |            |                      | procedure are in the |
|                    |        |            |                      |  results section.    |
+--------------------+--------+------------+----------------------+----------------------+
| EXTERNAL LAB: ALT  | Routin | 2014 |                      |   Results for this   |
|                    | e      |            |                      | procedure are in the |
|                    |        |            |                      |  results section.    |
+--------------------+--------+------------+----------------------+----------------------+
| EXTERNAL LAB: BARBARA | Routin | 2014 |                      |   Results for this   |
|                    | e      |            |                      | procedure are in the |
|                    |        |            |                      |  results section.    |
+--------------------+--------+------------+----------------------+----------------------+
| EXTERNAL LAB:      | Routin | 2014 |                      |   Results for this   |
| CREATININE         | e      |            |                      | procedure are in the |
|                    |        |            |                      |  results section.    |
+--------------------+--------+------------+----------------------+----------------------+
| CMPI               | Routin | 2014 |                      |   Results for this   |
|                    | e      |            |                      | procedure are in the |
|                    |        |            |                      |  results section.    |
+--------------------+--------+------------+----------------------+----------------------+
 documented in this encounter
 
 Results
 CMP/ISTAT (2014)
 
+-------------+-------+-----------------+------------+--------------+
| Component   | Value | Ref Range       | Performed  | Pathologist  |
|             |       |                 | At         | Signature    |
+-------------+-------+-----------------+------------+--------------+
| Na          | 142   | mmol/L          |            |              |
+-------------+-------+-----------------+------------+--------------+
| K           | 5.4   | mmol/L          |            |              |
+-------------+-------+-----------------+------------+--------------+
| Cl          | 108   | mmol/L          |            |              |
+-------------+-------+-----------------+------------+--------------+
| CO2         | 22    | mmol/L          |            |              |
 
+-------------+-------+-----------------+------------+--------------+
| BUN         | 40    | mg/dL           |            |              |
+-------------+-------+-----------------+------------+--------------+
| Glucose     | 142   | mg/dL           |            |              |
+-------------+-------+-----------------+------------+--------------+
| Calcium     | 10.4  | mg/dL           |            |              |
+-------------+-------+-----------------+------------+--------------+
| MCV         | 103.9 | fL              |            |              |
+-------------+-------+-----------------+------------+--------------+
| Bilirubin   | 0.6   | mg/dL           |            |              |
| Total       |       |                 |            |              |
+-------------+-------+-----------------+------------+--------------+
| Alkaline    | 100   | U/L             |            |              |
| Phosphatase |       |                 |            |              |
+-------------+-------+-----------------+------------+--------------+
| Albumin     | 4.0   | 3.3 - 4.8 g/dL  |            |              |
+-------------+-------+-----------------+------------+--------------+
| Magnesium   | 1.6   | mg/dL           |            |              |
+-------------+-------+-----------------+------------+--------------+
| PTH INTACT  | 218.9 | pg/mL           |            |              |
+-------------+-------+-----------------+------------+--------------+
| Tacrolimus  | 5.2   |                 |            |              |
| Level       |       |                 |            |              |
+-------------+-------+-----------------+------------+--------------+
| Phosphorus  | 3.0   | 2.6 - 4.4 mg/dL |            |              |
+-------------+-------+-----------------+------------+--------------+
 
 
 
+-----------------+
| Specimen        |
+-----------------+
| Blood specimen  |
| (specimen)      |
+-----------------+
 External Lab: CBC (2014)
 
+-----------+-------+-----------+------------+--------------+
| Component | Value | Ref Range | Performed  | Pathologist  |
|           |       |           | At         | Signature    |
+-----------+-------+-----------+------------+--------------+
| WBC,      | 6.3   | 4 - 11    | EXTERNAL   |              |
| External  |       |           | LAB        |              |
+-----------+-------+-----------+------------+--------------+
| HGB,      | 13.5  | 12 - 16   | EXTERNAL   |              |
| External  |       |           | LAB        |              |
+-----------+-------+-----------+------------+--------------+
| HCT,      | 40.8  | 35 - 45   | EXTERNAL   |              |
| External  |       |           | LAB        |              |
+-----------+-------+-----------+------------+--------------+
| PLT,      | 180   | 140 - 440 | EXTERNAL   |              |
| External  |       |           | LAB        |              |
+-----------+-------+-----------+------------+--------------+
 
 
 
+--------------------------+
| Resulting Agency Comment |
+--------------------------+
|   [facility]              |
 
+--------------------------+
 
 
 
+----------------+---------+--------------------+--------------+
| Performing     | Address | City/State/Zipcode | Phone Number |
| Organization   |         |                    |              |
+----------------+---------+--------------------+--------------+
|   EXTERNAL LAB |         |                    |              |
+----------------+---------+--------------------+--------------+
 External Lab: AST (2014)
 
+-----------+-------+-----------+------------+--------------+
| Component | Value | Ref Range | Performed  | Pathologist  |
|           |       |           | At         | Signature    |
+-----------+-------+-----------+------------+--------------+
| AST,      | 24    | 0 - 40    | EXTERNAL   |              |
| External  |       |           | LAB        |              |
+-----------+-------+-----------+------------+--------------+
 
 
 
+-----------------+
| Specimen        |
+-----------------+
| Blood specimen  |
| (specimen)      |
+-----------------+
 
 
 
+--------------------------+
| Resulting Agency Comment |
+--------------------------+
|   [facility]              |
+--------------------------+
 
 
 
+----------------+---------+--------------------+--------------+
| Performing     | Address | City/State/Zipcode | Phone Number |
| Organization   |         |                    |              |
+----------------+---------+--------------------+--------------+
|   EXTERNAL LAB |         |                    |              |
+----------------+---------+--------------------+--------------+
 External Lab: ALT (2014)
 
+-----------+-------+-----------+------------+--------------+
| Component | Value | Ref Range | Performed  | Pathologist  |
|           |       |           | At         | Signature    |
+-----------+-------+-----------+------------+--------------+
| ALT,      | 17    | 0 - 40    | EXTERNAL   |              |
| External  |       |           | LAB        |              |
+-----------+-------+-----------+------------+--------------+
 
 
 
+-----------------+
| Specimen        |
+-----------------+
 
| Blood specimen  |
| (specimen)      |
+-----------------+
 
 
 
+--------------------------+
| Resulting Agency Comment |
+--------------------------+
|   [facility]              |
+--------------------------+
 
 
 
+----------------+---------+--------------------+--------------+
| Performing     | Address | City/State/Zipcode | Phone Number |
| Organization   |         |                    |              |
+----------------+---------+--------------------+--------------+
|   EXTERNAL LAB |         |                    |              |
+----------------+---------+--------------------+--------------+
 External Lab: eGFR (2014)
 
+------------+--------+-----------+------------+--------------+
| Component  | Value  | Ref Range | Performed  | Pathologist  |
|            |        |           | At         | Signature    |
+------------+--------+-----------+------------+--------------+
| eGFR,      | 36 (A) | 60        | EXTERNAL   |              |
| External   |        |           | LAB        |              |
+------------+--------+-----------+------------+--------------+
| eGFR,      |        |           | EXTERNAL   |              |
|     |        |           | LAB        |              |
| [employer],  |        |           |            |              |
| External   |        |           |            |              |
+------------+--------+-----------+------------+--------------+
 
 
 
+-----------------+
| Specimen        |
+-----------------+
| Blood specimen  |
| (specimen)      |
+-----------------+
 
 
 
+--------------------------+
| Resulting Agency Comment |
+--------------------------+
|   [facility]              |
+--------------------------+
 
 
 
+----------------+---------+--------------------+--------------+
| Performing     | Address | City/State/Zipcode | Phone Number |
| Organization   |         |                    |              |
+----------------+---------+--------------------+--------------+
|   EXTERNAL LAB |         |                    |              |
+----------------+---------+--------------------+--------------+
 
 External Lab: Creatinine (2014)
 
+-------------+----------+-----------+------------+--------------+
| Component   | Value    | Ref Range | Performed  | Pathologist  |
|             |          |           | At         | Signature    |
+-------------+----------+-----------+------------+--------------+
| Creatinine, | 1.44 (A) | 0.6 - 1.3 | EXTERNAL   |              |
|  External   |          |           | LAB        |              |
+-------------+----------+-----------+------------+--------------+
 
 
 
+-----------------+
| Specimen        |
+-----------------+
| Blood specimen  |
| (specimen)      |
+-----------------+
 
 
 
+--------------------------+
| Resulting Agency Comment |
+--------------------------+
|   [facility]              |
+--------------------------+
 
 
 
+----------------+---------+--------------------+--------------+
| Performing     | Address | City/State/Zipcode | Phone Number |
| Organization   |         |                    |              |
+----------------+---------+--------------------+--------------+
|   EXTERNAL LAB |         |                    |              |
+----------------+---------+--------------------+--------------+
 documented in this encounter
 
 Visit Diagnoses
 Not on filedocumented in this encounter

## 2020-01-08 NOTE — XMS
Encounter Summary
  Created on: 2020
 
 Viviana Ricci
 External Reference #: [id number]
 : 47
 Sex: Female
 
 Demographics
 
 
+-----------------------+----------------------+
| Address               | [address]      |
|                       | [place]  [address] |
+-----------------------+----------------------+
| Home Phone            | [phone]      |
+-----------------------+----------------------+
| Preferred Language    | Unknown              |
+-----------------------+----------------------+
| Marital Status        | Single               |
+-----------------------+----------------------+
| Gnosticist Affiliation | [id number]                 |
+-----------------------+----------------------+
| Race                  | Unknown              |
+-----------------------+----------------------+
| Ethnic Group          | Unknown              |
+-----------------------+----------------------+
 
 
 Author
 
 
+--------------+--------------------------------------------+
| Author       | [facility]  |
|              | and [place]                                |
+--------------+--------------------------------------------+
| Organization | [facility]  |
|              | and [place]                                |
+--------------+--------------------------------------------+
| Address      | Unknown                                    |
+--------------+--------------------------------------------+
| Phone        | Unavailable                                |
+--------------+--------------------------------------------+
 
 
 
 Support
 
 
+----------------+--------------+---------------------+-----------------+
| Name           | Relationship | Address             | Phone           |
+----------------+--------------+---------------------+-----------------+
| Ada/Ed Radhames | ECON         | [place]              | [phone] |
|                |              | [place], [place]  |                 |
|                |              |  [address]              |                 |
+----------------+--------------+---------------------+-----------------+
 
 
 
 
 Care Team Providers
 
 
+-----------------------+------+-------------+
| Care Team Member Name | Role | Phone       |
+-----------------------+------+-------------+
 PCP  | Unavailable |
+-----------------------+------+-------------+
 
 
 
 Encounter Details
 
 
+--------+-----------+----------------------+-----------+-------------+
| Date   | Type      | Department           | Care Team | Description |
+--------+-----------+----------------------+-----------+-------------+
| / | [facility]  |   [facility] |           |             |
|    | Encounter |  MED CTR GENERIC OP  |           |             |
|        |           | CONV DEPT  [address]     |           |             |
|        |           | [address]  [place], |           |             |
|        |           |  [place] [address]       |           |             |
|        |           | [phone]         |           |             |
+--------+-----------+----------------------+-----------+-------------+
 
 
 
 Social History
 
 
+----------------+-------+-----------+--------+------+
| Tobacco Use    | Types | Packs/Day | Years  | Date |
|                |       |           | Used   |      |
+----------------+-------+-----------+--------+------+
| Never Assessed |       |           |        |      |
+----------------+-------+-----------+--------+------+
 
 
 
+------------------+---------------+
| Sex Assigned at  | Date Recorded |
| Birth            |               |
+------------------+---------------+
| Not on file      |               |
+------------------+---------------+
 
 
 
+----------------+-------------+-------------+
| Job Start Date | Occupation  | Industry    |
+----------------+-------------+-------------+
| Not on file    | Not on file | Not on file |
+----------------+-------------+-------------+
 
 
 
+----------------+--------------+------------+
| Travel History | Travel Start | Travel End |
+----------------+--------------+------------+
 
 
 
 
+-------------------------------------+
| No recent travel history available. |
+-------------------------------------+
 documented as of this encounter
 
 Plan of Treatment
 
 
+--------+-----------+------------+----------------------+-------------------+
| Date   | Type      | Specialty  | Care Team            | Description       |
+--------+-----------+------------+----------------------+-------------------+
| / | Office    | Cardiology |   Tonia Wilson,    |                   |
|    | Visit     |            | ARNJESSICA  [address]   |                   |
|        |           |            | St  [place] [facility], [place]  |                   |
|        |           |            | [address]  [phone]  |                   |
|        |           |            |  [phone] (Fax)  |                   |
+--------+-----------+------------+----------------------+-------------------+
| / | Hospital  | Radiology  |   Popeye Townsend, |                   |
|    | Encounter |            |  MD  [address] |                   |
|        |           |            |  St.  [place],   |                   |
|        |           |            | [place] [address]             |                   |
|        |           |            | [phone]         |                   |
|        |           |            | [phone] (Fax)   |                   |
+--------+-----------+------------+----------------------+-------------------+
| / | Surgery   | Radiology  |   Popeye Townsend, | CV EP PPM SYSTEM  |
|    |           |            |  MD  [address] | IMPLANT           |
|        |           |            |  St.  [place],   |                   |
|        |           |            | [place] [address]             |                   |
|        |           |            | [phone]         |                   |
|        |           |            | [phone] (Fax)   |                   |
+--------+-----------+------------+----------------------+-------------------+
| 02/10/ | Clinical  | Cardiology |                      |                   |
|    | Support   |            |                      |                   |
+--------+-----------+------------+----------------------+-------------------+
| / | Office    | Cardiology |   Arlen Wilsona,    |                   |
|    | Visit     |            | [facility]  [address]   |                   |
|        |           |            | [place]  |                   |
|        |           |            | [address]  [phone]  |                   |
|        |           |            |  [phone] (Fax)  |                   |
+--------+-----------+------------+----------------------+-------------------+
| / | Off-Site  | Nephrology |   Marzena Moser  |                   |
|    | Visit     |            | DO ETIENNE  [address] [place] [address]      |                   |
|        |           |            | [address]      |                   |
|        |           |            | [place]      |                   |
|        |           |            | [address] [phone]  |                   |
|        |           |            |  [phone] (Fax)  |                   |
+--------+-----------+------------+----------------------+-------------------+
 documented as of this encounter
 
 Visit Diagnoses
 Not on filedocumented in this encounter

## 2020-01-08 NOTE — XMS
Encounter Summary
  Created on: 2020
 
 Viviana Ricci
 External Reference #: [id number]
 : 47
 Sex: Female
 
 Demographics
 
 
+-----------------------+----------------------+
| Address               | [address]      |
|                       | [place]  [address] |
+-----------------------+----------------------+
| Home Phone            | [phone]      |
+-----------------------+----------------------+
| Preferred Language    | Unknown              |
+-----------------------+----------------------+
| Marital Status        | Single               |
+-----------------------+----------------------+
| Yarsanism Affiliation | [id number]                 |
+-----------------------+----------------------+
| Race                  | Unknown              |
+-----------------------+----------------------+
| Ethnic Group          | Unknown              |
+-----------------------+----------------------+
 
 
 Author
 
 
+--------------+--------------------------------------------+
| Author       | [facility]  |
|              | and [place]                                |
+--------------+--------------------------------------------+
| Organization | [facility]  |
|              | and [place]                                |
+--------------+--------------------------------------------+
| Address      | Unknown                                    |
+--------------+--------------------------------------------+
| Phone        | Unavailable                                |
+--------------+--------------------------------------------+
 
 
 
 Support
 
 
+----------------+--------------+---------------------+-----------------+
| Name           | Relationship | Address             | Phone           |
+----------------+--------------+---------------------+-----------------+
| Ada/Ed Radhames | ECON         | [place]              | [phone] |
|                |              | [place]  |                 |
|                |              |  [address]              |                 |
+----------------+--------------+---------------------+-----------------+
 
 
 
 
 Care Team Providers
 
 
+-----------------------+------+-------------+
| Care Team Member Name | Role | Phone       |
+-----------------------+------+-------------+
 PCP  | Unavailable |
+-----------------------+------+-------------+
 
 
 
 Reason for Visit
 
 
+-------------------+----------+
| Reason            | Comments |
+-------------------+----------+
| Medication Refill |          |
+-------------------+----------+
 
 
 
 Encounter Details
 
 
+--------+--------+---------------------+----------------------+-------------------+
| Date   | Type   | Department          | Care Team            | Description       |
+--------+--------+---------------------+----------------------+-------------------+
| / | Refill |   [facility]         |   Marzena Moser  | Medication Refill |
| 2017   |        | NEPHROLOGY  [address]   | M, DO  [address]      |                   |
|        |        | [address]   | [address]      |                   |
|        |        | [place], [place]     | [place], [place]      |                   |
|        |        | [address]          | [address]  [phone]  |                   |
|        |        | [phone]        |  [phone] (Fax)  |                   |
+--------+--------+---------------------+----------------------+-------------------+
 
 
 
 Social History
 
 
+--------------+-------+-----------+--------+------+
| Tobacco Use  | Types | Packs/Day | Years  | Date |
|              |       |           | Used   |      |
+--------------+-------+-----------+--------+------+
| Never Smoker |       |           |        |      |
+--------------+-------+-----------+--------+------+
 
 
 
+---------------------+---+---+---+
| Smokeless Tobacco:  |   |   |   |
| Never Used          |   |   |   |
+---------------------+---+---+---+
 
 
 
+---------------------------------------------------------------+
| Comments: some second hand smoke exposure, but fairly minimal |
 
+---------------------------------------------------------------+
 
 
 
+-------------+-------------+---------+----------+
| Alcohol Use | Drinks/Week | oz/Week | Comments |
+-------------+-------------+---------+----------+
| No          |             |         |          |
+-------------+-------------+---------+----------+
 
 
 
+------------------+---------------+
| Sex Assigned at  | Date Recorded |
| Birth            |               |
+------------------+---------------+
| Not on file      |               |
+------------------+---------------+
 
 
 
+----------------+-------------+-------------+
| Job Start Date | Occupation  | Industry    |
+----------------+-------------+-------------+
| Not on file    | Not on file | Not on file |
+----------------+-------------+-------------+
 
 
 
+----------------+--------------+------------+
| Travel History | Travel Start | Travel End |
+----------------+--------------+------------+
 
 
 
+-------------------------------------+
| No recent travel history available. |
+-------------------------------------+
 documented as of this encounter
 
 Plan of Treatment
 
 
+--------+-----------+------------+----------------------+-------------------+
| Date   | Type      | Specialty  | Care Team            | Description       |
+--------+-----------+------------+----------------------+-------------------+
| / | Office    | Cardiology |   HellalfredoTonia,    |                   |
|    | Visit     |            | ARNP  [address]   |                   |
|        |           |            | St  [place], [place]  |                   |
|        |           |            | [address]  [phone]  |                   |
|        |           |            |  [phone] (Fax)  |                   |
+--------+-----------+------------+----------------------+-------------------+
| / | Hospital  | Radiology  |   Popeye Townsend, |                   |
|    | Encounter |            |  MD  [address] |                   |
|        |           |            |  St.  [place],   |                   |
|        |           |            | [place] [address]             |                   |
|        |           |            | [phone]         |                   |
|        |           |            | [phone] (Fax)   |                   |
+--------+-----------+------------+----------------------+-------------------+
| / | Surgery   | Radiology  |   Popeye Townsend, | CV EP PPM SYSTEM  |
 
|    |           |            |  MD  [address] | IMPLANT           |
|        |           |            |  St.  [place],   |                   |
|        |           |            | [place] [address]             |                   |
|        |           |            | [phone]         |                   |
|        |           |            | [phone] (Fax)   |                   |
+--------+-----------+------------+----------------------+-------------------+
| 02/10/ | Clinical  | Cardiology |                      |                   |
|    | Support   |            |                      |                   |
+--------+-----------+------------+----------------------+-------------------+
| / | Office    | Cardiology |   Tonia Wilson,    |                   |
|    | Visit     |            | ARNP  [address]   |                   |
|        |           |            | [place]  |                   |
|        |           |            | [address]  [phone]  |                   |
|        |           |            |  [phone] (Fax)  |                   |
+--------+-----------+------------+----------------------+-------------------+
| / | Off-Site  | Nephrology |   Marzena Moser  |                   |
|    | Visit     |            | DO ETIENNE  [address] [place] [address]      |                   |
|        |           |            | [address]      |                   |
|        |           |            | [place]      |                   |
|        |           |            | [address]  [phone]  |                   |
|        |           |            |  [phone] (Fax)  |                   |
+--------+-----------+------------+----------------------+-------------------+
 documented as of this encounter
 
 Visit Diagnoses
 Not on filedocumented in this encounter

## 2020-01-08 NOTE — XMS
Encounter Summary
  Created on: 2020
 
 Viviana Ricci
 External Reference #: [id number]
 : 47
 Sex: Female
 
 Demographics
 
 
+-----------------------+----------------------+
| Address               | [address]      |
|                       | [place]  [address] |
+-----------------------+----------------------+
| Home Phone            | [phone]      |
+-----------------------+----------------------+
| Preferred Language    | Unknown              |
+-----------------------+----------------------+
| Marital Status        | Single               |
+-----------------------+----------------------+
| Sikh Affiliation | [id number]                 |
+-----------------------+----------------------+
| Race                  | Unknown              |
+-----------------------+----------------------+
| Ethnic Group          | Unknown              |
+-----------------------+----------------------+
 
 
 Author
 
 
+--------------+--------------------------------------------+
| Author       | [facility]  |
|              | and [place]                                |
+--------------+--------------------------------------------+
| Organization | [facility]  |
|              | and [place]                                |
+--------------+--------------------------------------------+
| Address      | Unknown                                    |
+--------------+--------------------------------------------+
| Phone        | Unavailable                                |
+--------------+--------------------------------------------+
 
 
 
 Support
 
 
+----------------+--------------+---------------------+-----------------+
| Name           | Relationship | Address             | Phone           |
+----------------+--------------+---------------------+-----------------+
| Ada/Ed Radhames | ECON         | [address]              | [phone] |
|                |              | [place]  |                 |
|                |              |  [address]              |                 |
+----------------+--------------+---------------------+-----------------+
 
 
 
 
 Care Team Providers
 
 
+-----------------------+------+-------------+
| Care Team Member Name | Role | Phone       |
+-----------------------+------+-------------+
 PCP  | Unavailable |
+-----------------------+------+-------------+
 
 
 
 Encounter Details
 
 
+--------+----------+---------------------+----------------------+-------------+
| Date   | Type     | Department          | Care Team            | Description |
+--------+----------+---------------------+----------------------+-------------+
| / | Abstract |   [facility] [place]         |   Marzena Moser  |             |
|    |          | NEPHROLOGY  [address]   | M, DO  [address]      |             |
|        |          | [address]   | [address]      |             |
|        |          | [place], [place]     | [place]      |             |
|        |          | [address]          | [address]  [phone]  |             |
|        |          | [phone]        |  [phone] (Fax)  |             |
+--------+----------+---------------------+----------------------+-------------+
 
 
 
 Social History
 
 
+--------------+-------+-----------+--------+------+
| Tobacco Use  | Types | Packs/Day | Years  | Date |
|              |       |           | Used   |      |
+--------------+-------+-----------+--------+------+
| Never Smoker |       |           |        |      |
+--------------+-------+-----------+--------+------+
 
 
 
+---------------------+---+---+---+
| Smokeless Tobacco:  |   |   |   |
| Never Used          |   |   |   |
+---------------------+---+---+---+
 
 
 
+---------------------------------------------------------------+
| Comments: some second hand smoke exposure, but fairly minimal |
+---------------------------------------------------------------+
 
 
 
+-------------+-------------+---------+----------+
| Alcohol Use | Drinks/Week | oz/Week | Comments |
+-------------+-------------+---------+----------+
| No          |             |         |          |
+-------------+-------------+---------+----------+
 
 
 
 
+------------------+---------------+
| Sex Assigned at  | Date Recorded |
| Birth            |               |
+------------------+---------------+
| Not on file      |               |
+------------------+---------------+
 
 
 
+----------------+-------------+-------------+
| Job Start Date | Occupation  | Industry    |
+----------------+-------------+-------------+
| Not on file    | Not on file | Not on file |
+----------------+-------------+-------------+
 
 
 
+----------------+--------------+------------+
| Travel History | Travel Start | Travel End |
+----------------+--------------+------------+
 
 
 
+-------------------------------------+
| No recent travel history available. |
+-------------------------------------+
 documented as of this encounter
 
 Plan of Treatment
 
 
+--------+-----------+------------+----------------------+-------------------+
| Date   | Type      | Specialty  | Care Team            | Description       |
+--------+-----------+------------+----------------------+-------------------+
| / | Office    | Cardiology |   Tonia Wilson,    |                   |
|    | Visit     |            | ARNJESSICA  [address]   |                   |
|        |           |            | [place]  |                   |
|        |           |            | [address]  [phone]  |                   |
|        |           |            |  [phone] (Fax)  |                   |
+--------+-----------+------------+----------------------+-------------------+
| / | Hospital  | Radiology  |   Popeye Townsend, |                   |
|    | Encounter |            |  MD  [address] |                   |
|        |           |            |  St.  [place],   |                   |
|        |           |            | [place] [address]             |                   |
|        |           |            | [phone]         |                   |
|        |           |            | [phone] (Fax)   |                   |
+--------+-----------+------------+----------------------+-------------------+
| / | Surgery   | Radiology  |   Popeye Townsend, | CV EP PPM SYSTEM  |
|    |           |            |  MD  [address] | IMPLANT           |
|        |           |            |  St.  [place],   |                   |
|        |           |            | [place] [address]             |                   |
|        |           |            | [phone]         |                   |
|        |           |            | [phone] (Fax)   |                   |
+--------+-----------+------------+----------------------+-------------------+
| 02/10/ | Clinical  | Cardiology |                      |                   |
|    | Support   |            |                      |                   |
+--------+-----------+------------+----------------------+-------------------+
| / | Office    | Cardiology |   Tonia Wilson,    |                   |
|    | Visit     |            | ARNP  [address]   |                   |
 
|        |           |            | St  [place], [place]  |                   |
|        |           |            | [address]  [phone]  |                   |
|        |           |            |  [phone] (Fax)  |                   |
+--------+-----------+------------+----------------------+-------------------+
| / | Off-Site  | Nephrology |   Marzena Moser  |                   |
| 2020   | Visit     |            | M,   [address] [place] [address]      |                   |
|        |           |            | [address]      |                   |
|        |           |            | [place]      |                   |
|        |           |            | [address]  [phone]  |                   |
|        |           |            |  [phone] (Fax)  |                   |
+--------+-----------+------------+----------------------+-------------------+
 documented as of this encounter
 
 Procedures
 
 
+----------------------+--------+------------+----------------------+----------------------+
| Procedure Name       | Priori | Date/Time  | Associated Diagnosis | Comments             |
|                      | ty     |            |                      |                      |
+----------------------+--------+------------+----------------------+----------------------+
| EXTERNAL LAB: PTH,   | Routin | 2016 |                      |   Results for this   |
| INTACT               | e      |            |                      | procedure are in the |
|                      |        |            |                      |  results section.    |
+----------------------+--------+------------+----------------------+----------------------+
| CULTURE, URINE,      | Routin | 2016 |                      |   Results for this   |
| REFLEXIVE            | e      |            |                      | procedure are in the |
|                      |        |            |                      |  results section.    |
+----------------------+--------+------------+----------------------+----------------------+
| BK VIRUS,NAAT,URINE, | Routin | 2016 |                      |   Results for this   |
|  QNT,(VIRACOR)       | e      |            |                      | procedure are in the |
|                      |        |            |                      |  results section.    |
+----------------------+--------+------------+----------------------+----------------------+
 documented in this encounter
 
 Results
 Culture, Urine, Reflexive (2016)
 
+----------+
| Specimen |
+----------+
|          |
+----------+
 
 
 
+------------------------------------------------------------------------+--------------+
| Impressions                                                            | Performed At |
+------------------------------------------------------------------------+--------------+
|   16 Over 100,000 CFU/mL Lactose , Identification to     |              |
| follow.     16 Lactose  identified as Escherichia coli.  |              |
|                                                                        |              |
+------------------------------------------------------------------------+--------------+
 
 
 
+------------------+------------------+--------+----------------+
| Organism         | Antibiotic       | Method | Susceptibility |
+------------------+------------------+--------+----------------+
| Escherichia coli | Amoxicillin +    |        |   Sensitive    |
|                  | Clavulanate      |        |                |
 
+------------------+------------------+--------+----------------+
| Escherichia coli | Piperacillin +   |        |   Sensitive    |
|                  | Tazobactam       |        |                |
+------------------+------------------+--------+----------------+
| Escherichia coli | Cefazolin        |        |   Sensitive    |
+------------------+------------------+--------+----------------+
| Escherichia coli | Ceftriaxone      |        |   Sensitive    |
+------------------+------------------+--------+----------------+
| Escherichia coli | Cefepime         |        |   Sensitive    |
+------------------+------------------+--------+----------------+
| Escherichia coli | Aztreonam        |        |   Sensitive    |
+------------------+------------------+--------+----------------+
| Escherichia coli | Ertapenem        |        |   Sensitive    |
+------------------+------------------+--------+----------------+
| Escherichia coli | Imipenem         |        |   Sensitive    |
+------------------+------------------+--------+----------------+
| Escherichia coli | Gentamicin       |        |   Sensitive    |
+------------------+------------------+--------+----------------+
| Escherichia coli | Ciprofloxacin    |        |   Sensitive    |
+------------------+------------------+--------+----------------+
| Escherichia coli | Levofloxacin     |        |   Sensitive    |
+------------------+------------------+--------+----------------+
| Escherichia coli | Tetracycline     |        |   Sensitive    |
+------------------+------------------+--------+----------------+
| Escherichia coli | Nitrofurantoin   |        |   Sensitive    |
+------------------+------------------+--------+----------------+
| Escherichia coli | Trimethoprim +   |        |   Sensitive    |
|                  | Sulfamethoxazole |        |                |
+------------------+------------------+--------+----------------+
| Escherichia coli | Ampicillin       |        |   Intermediate |
+------------------+------------------+--------+----------------+
 External Lab: PTH, Intact (2016)
 
+-------------+-----------+-----------+------------+--------------+
| Component   | Value     | Ref Range | Performed  | Pathologist  |
|             |           |           | At         | Signature    |
+-------------+-----------+-----------+------------+--------------+
| PTH Intact, | 193.0 (A) | 15 - 65   |            |              |
|  External   |           |           |            |              |
+-------------+-----------+-----------+------------+--------------+
 
 
 
+----------+
| Specimen |
+----------+
|          |
+----------+
 Bk Virus, Quant, Urine ([facility]acor) (2016)
 
+-------------+-----------+-----------+-------------+--------------+
| Component   | Value     | Ref Range | Performed   | Pathologist  |
|             |           |           | At          | Signature    |
+-------------+-----------+-----------+-------------+--------------+
| BK Virus    | equivocal |           | [facility]  |              |
| DNA, Urine, |           |           | [facility]    |              |
|  PCR        |           |           | [facility]     |              |
|             |           |           | [facility] -    |              |
|             |           |           | LABORATORY  |              |
+-------------+-----------+-----------+-------------+--------------+
 
 
 
 
+-----------------+
| Specimen        |
+-----------------+
| Urine specimen  |
| (specimen)      |
+-----------------+
 
 
 
+----------------------+--------------------+--------------------+----------------+
| Performing           | Address            | City/State/Zipcode | Phone Number   |
| Organization         |                    |                    |                |
+----------------------+--------------------+--------------------+----------------+
|   [facility]     |   [address] | [place]    |   [phone] |
| [facility]  |                    | [address]              |                |
| - LABORATORY         |                    |                    |                |
+----------------------+--------------------+--------------------+----------------+
 documented in this encounter
 
 Visit Diagnoses
 Not on filedocumented in this encounter

## 2020-01-08 NOTE — XMS
Encounter Summary
  Created on: 2020
 
 Viviana Ricci
 External Reference #: [id number]
 : 47
 Sex: Female
 
 Demographics
 
 
+-----------------------+----------------------+
| Address               | [address]      |
|                       | [place]  [address] |
+-----------------------+----------------------+
| Home Phone            | [phone]      |
+-----------------------+----------------------+
| Preferred Language    | Unknown              |
+-----------------------+----------------------+
| Marital Status        | Single               |
+-----------------------+----------------------+
| Episcopalian Affiliation | 1009                 |
+-----------------------+----------------------+
| Race                  | Unknown              |
+-----------------------+----------------------+
| Ethnic Group          | Unknown              |
+-----------------------+----------------------+
 
 
 Author
 
 
+--------------+--------------------------------------------+
| Author       | [facility]  |
|              | and [place]                                |
+--------------+--------------------------------------------+
| Organization | [facility]  |
|              | and [facility]                                |
+--------------+--------------------------------------------+
| Address      | Unknown                                    |
+--------------+--------------------------------------------+
| Phone        | Unavailable                                |
+--------------+--------------------------------------------+
 
 
 
 Support
 
 
+----------------+--------------+---------------------+-----------------+
| Name           | Relationship | Address             | Phone           |
+----------------+--------------+---------------------+-----------------+
| Ada/Ed Radhames | ECON         | [address]              | [phone] |
|                |              | [place]  |                 |
|                |              |  [address]              |                 |
+----------------+--------------+---------------------+-----------------+
 
 
 
 
 Care Team Providers
 
 
+------------------------+------+-----------------+
| Care Team Member Name  | Role | Phone           |
+------------------------+------+-----------------+
| Marzena Moser DO | PCP  | [phone] |
+------------------------+------+-----------------+
 
 
 
 Reason for Visit
 
 
+-------------------+----------+
| Reason            | Comments |
+-------------------+----------+
| Medication Refill |          |
+-------------------+----------+
 
 
 
 Encounter Details
 
 
+--------+--------+---------------------+----------------------+-------------------+
| Date   | Type   | Department          | Care Team            | Description       |
+--------+--------+---------------------+----------------------+-------------------+
| / | Refill |   [place]         |   Marzena Moser  | Medication Refill |
|    |        | NEPHROLOGY  [address]   | M, DO  [address]      |                   |
|        |        | [address]   | [address]      |                   |
|        |        | [place], [place]     | [place], [place]      |                   |
|        |        | [address]          | [address]  [phone]  |                   |
|        |        | [phone]        |  [phone] (Fax)  |                   |
+--------+--------+---------------------+----------------------+-------------------+
 
 
 
 Social History
 
 
+--------------+-------+-----------+--------+------+
| Tobacco Use  | Types | Packs/Day | Years  | Date |
|              |       |           | Used   |      |
+--------------+-------+-----------+--------+------+
| Never Smoker |       |           |        |      |
+--------------+-------+-----------+--------+------+
 
 
 
+---------------------+---+---+---+
| Smokeless Tobacco:  |   |   |   |
| Never Used          |   |   |   |
+---------------------+---+---+---+
 
 
 
+---------------------------------------------------------------+
| Comments: some second hand smoke exposure, but fairly minimal |
 
+---------------------------------------------------------------+
 
 
 
+-------------+-------------+---------+----------+
| Alcohol Use | Drinks/Week | oz/Week | Comments |
+-------------+-------------+---------+----------+
| No          |             |         |          |
+-------------+-------------+---------+----------+
 
 
 
+------------------+---------------+
| Sex Assigned at  | Date Recorded |
| Birth            |               |
+------------------+---------------+
| Not on file      |               |
+------------------+---------------+
 
 
 
+----------------+-------------+-------------+
| Job Start Date | Occupation  | Industry    |
+----------------+-------------+-------------+
| Not on file    | Not on file | Not on file |
+----------------+-------------+-------------+
 
 
 
+----------------+--------------+------------+
| Travel History | Travel Start | Travel End |
+----------------+--------------+------------+
 
 
 
+-------------------------------------+
| No recent travel history available. |
+-------------------------------------+
 documented as of this encounter
 
 Plan of Treatment
 
 
+--------+-----------+------------+----------------------+-------------------+
| Date   | Type      | Specialty  | Care Team            | Description       |
+--------+-----------+------------+----------------------+-------------------+
| / | Office    | Cardiology |   Tonia Wilson,    |                   |
|    | Visit     |            | ARNP  [address]   |                   |
|        |           |            | St [place], [place]  |                   |
|        |           |            | [address]  [phone]  |                   |
|        |           |            |  [phone] (Fax)  |                   |
+--------+-----------+------------+----------------------+-------------------+
| / | Hospital  | Radiology  |   Popeye Townsend, |                   |
|    | Encounter |            |  MD  [address] |                   |
|        |           |            |  [place],   |                   |
|        |           |            | [place] [address]             |                   |
|        |           |            | [phone]         |                   |
|        |           |            | [phone] (Fax)   |                   |
+--------+-----------+------------+----------------------+-------------------+
| / | Surgery   | Radiology  |   Popeye Townsend, | CV EP PPM SYSTEM  |
 
|    |           |            |  MD  [address] | IMPLANT           |
|        |           |            |  [place],   |                   |
|        |           |            | [place] [address]             |                   |
|        |           |            | [phone]         |                   |
|        |           |            | [phone] (Fax)   |                   |
+--------+-----------+------------+----------------------+-------------------+
| 02/10/ | Clinical  | Cardiology |                      |                   |
|    | Support   |            |                      |                   |
+--------+-----------+------------+----------------------+-------------------+
| / | Office    | Cardiology |   Tonia Wilson,    |                   |
|    | Visit     |            | ARNP  [address]   |                   |
|        |           |            | [place]  |                   |
|        |           |            | [id number] [phone]  |                   |
|        |           |            |  [phone] (Fax)  |                   |
+--------+-----------+------------+----------------------+-------------------+
| / | Off-Site  | Nephrology |   Marzena Moser  |                   |
|    | Visit     |            | DO ETIENNE  [address] [place] [address]      |                   |
|        |           |            | [address]      |                   |
|        |           |            | [place]      |                   |
|        |           |            | [address] [phone]  |                   |
|        |           |            |  [phone] (Fax)  |                   |
+--------+-----------+------------+----------------------+-------------------+
 documented as of this encounter
 
 Visit Diagnoses
 
 
+-------------------------------------------+
| Diagnosis                                 |
+-------------------------------------------+
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
+-------------------------------------------+
 documented in this encounter

## 2020-01-08 NOTE — XMS
Encounter Summary
  Created on: 2020
 
 Viviana Ricci
 External Reference #: [id number]
 : 47
 Sex: Female
 
 Demographics
 
 
+-----------------------+----------------------+
| Address               | [address]      |
|                       | [place]  [address] |
+-----------------------+----------------------+
| Home Phone            | [phone]      |
+-----------------------+----------------------+
| Preferred Language    | Unknown              |
+-----------------------+----------------------+
| Marital Status        | Single               |
+-----------------------+----------------------+
| Buddhist Affiliation | 1009                 |
+-----------------------+----------------------+
| Race                  | Unknown              |
+-----------------------+----------------------+
| Ethnic Group          | Unknown              |
+-----------------------+----------------------+
 
 
 Author
 
 
+--------------+--------------------------------------------+
| Author       | [facility]  |
|              | and [facility]                                |
+--------------+--------------------------------------------+
| Organization | [facility]  |
|              | and [facility]                                |
+--------------+--------------------------------------------+
| Address      | Unknown                                    |
+--------------+--------------------------------------------+
| Phone        | Unavailable                                |
+--------------+--------------------------------------------+
 
 
 
 Support
 
 
+----------------+--------------+---------------------+-----------------+
| Name           | Relationship | Address             | Phone           |
+----------------+--------------+---------------------+-----------------+
| Ada/Ed Radhames | ECON         | [address]              | [phone] |
|                |              | [place]  |                 |
|                |              |  [address]              |                 |
+----------------+--------------+---------------------+-----------------+
 
 
 
 
 Care Team Providers
 
 
+-----------------------+------+-------------+
| Care Team Member Name | Role | Phone       |
+-----------------------+------+-------------+
 PCP  | Unavailable |
+-----------------------+------+-------------+
 
 
 
 Encounter Details
 
 
+--------+-----------+----------------------+----------------------+-------------+
| Date   | Type      | Department           | Care Team            | Description |
+--------+-----------+----------------------+----------------------+-------------+
| / | Hospital  |   [facility] |   Marzena Moser  |             |
|  - | Encounter |  MED [facility] ICU  [address]  | M, DO  [address]      |             |
|        |           | [place], | [address]      |             |
| / |           |  [place] [address]       | [place]      |             |
|    |           | [phone]         | [address]  [phone]  |             |
|        |           |                      |  [phone] (Fax)  |             |
+--------+-----------+----------------------+----------------------+-------------+
 
 
 
 Social History
 
 
+----------------+-------+-----------+--------+------+
| Tobacco Use    | Types | Packs/Day | Years  | Date |
|                |       |           | Used   |      |
+----------------+-------+-----------+--------+------+
| Never Assessed |       |           |        |      |
+----------------+-------+-----------+--------+------+
 
 
 
+------------------+---------------+
| Sex Assigned at  | Date Recorded |
| Birth            |               |
+------------------+---------------+
| Not on file      |               |
+------------------+---------------+
 
 
 
+----------------+-------------+-------------+
| Job Start Date | Occupation  | Industry    |
+----------------+-------------+-------------+
| Not on file    | Not on file | Not on file |
+----------------+-------------+-------------+
 
 
 
+----------------+--------------+------------+
| Travel History | Travel Start | Travel End |
+----------------+--------------+------------+
 
 
 
 
+-------------------------------------+
| No recent travel history available. |
+-------------------------------------+
 documented as of this encounter
 
 Plan of Treatment
 
 
+--------+-----------+------------+----------------------+-------------------+
| Date   | Type      | Specialty  | Care Team            | Description       |
+--------+-----------+------------+----------------------+-------------------+
| / | Office    | Cardiology |   Tonia Wilson,    |                   |
|    | Visit     |            | ARNJESSICA  [address]   |                   |
|        |           |            | St  [facility] [place]  |                   |
|        |           |            | [address]  [phone]  |                   |
|        |           |            |  [phone] (Fax)  |                   |
+--------+-----------+------------+----------------------+-------------------+
| / | Hospital  | Radiology  |   Popeye Townsend, |                   |
|    | Encounter |            |  MD  [address] |                   |
|        |           |            |  [place],   |                   |
|        |           |            | [place] [address]             |                   |
|        |           |            | [phone]         |                   |
|        |           |            | [phone] (Fax)   |                   |
+--------+-----------+------------+----------------------+-------------------+
| / | Surgery   | Radiology  |   Popeye Townsend, | CV EP PPM SYSTEM  |
|    |           |            |  MD  [address] | IMPLANT           |
|        |           |            |  St.  [place],   |                   |
|        |           |            | [place] [address]             |                   |
|        |           |            | [phone]         |                   |
|        |           |            | [phone] (Fax)   |                   |
+--------+-----------+------------+----------------------+-------------------+
| 02/10/ | Clinical  | Cardiology |                      |                   |
|    | Support   |            |                      |                   |
+--------+-----------+------------+----------------------+-------------------+
| / | Office    | Cardiology |   Tonia Wilson,    |                   |
|    | Visit     |            | ARNP  [address]   |                   |
|        |           |            | [place]  |                   |
|        |           |            | [address]  [phone]  |                   |
|        |           |            |  [phone] (Fax)  |                   |
+--------+-----------+------------+----------------------+-------------------+
| / | Off-Site  | Nephrology |   Marzena Moser  |                   |
|    | Visit     |            | DO ETIENNE  [address] [place] [address]      |                   |
|        |           |            | [address]      |                   |
|        |           |            | [place]      |                   |
|        |           |            | [address] [phone]  |                   |
|        |           |            |  [phone] (Fax)  |                   |
+--------+-----------+------------+----------------------+-------------------+
 documented as of this encounter
 
 Visit Diagnoses
 Not on filedocumented in this encounter

## 2020-01-08 NOTE — XMS
Encounter Summary
  Created on: 2020
 
 Viviana Ricci
 External Reference #: [id number]
 : 47
 Sex: Female
 
 Demographics
 
 
+-----------------------+----------------------+
| Address               | [address]      |
|                       | [place]  [address] |
+-----------------------+----------------------+
| Home Phone            | [phone]      |
+-----------------------+----------------------+
| Preferred Language    | Unknown              |
+-----------------------+----------------------+
| Marital Status        | Single               |
+-----------------------+----------------------+
| Muslim Affiliation | [id number]                 |
+-----------------------+----------------------+
| Race                  | Unknown              |
+-----------------------+----------------------+
| Ethnic Group          | Unknown              |
+-----------------------+----------------------+
 
 
 Author
 
 
+--------------+--------------------------------------------+
| Author       | [facility]  |
|              | and [facility]                                |
+--------------+--------------------------------------------+
| Organization | [facility]  |
|              | and [facility]                                |
+--------------+--------------------------------------------+
| Address      | Unknown                                    |
+--------------+--------------------------------------------+
| Phone        | Unavailable                                |
+--------------+--------------------------------------------+
 
 
 
 Support
 
 
+----------------+--------------+---------------------+-----------------+
| Name           | Relationship | Address             | Phone           |
+----------------+--------------+---------------------+-----------------+
| Ada/Ed Radhames | ECON         | [address]              | [phone] |
|                |              | [place]  |                 |
|                |              |  [id number]              |                 |
+----------------+--------------+---------------------+-----------------+
 
 
 
 
 Care Team Providers
 
 
+-----------------------+------+-------------+
| Care Team Member Name | Role | Phone       |
+-----------------------+------+-------------+
 PCP  | Unavailable |
+-----------------------+------+-------------+
 
 
 
 Encounter Details
 
 
+--------+----------+---------------------+----------------------+-------------+
| Date   | Type     | Department          | Care Team            | Description |
+--------+----------+---------------------+----------------------+-------------+
| 10/19/ | Abstract |   [facility] [place]         |   Marzena Moser  |             |
|    |          | NEPHROLOGY  [address]   | M, DO  [address]      |             |
|        |          | [address]   | [address]      |             |
|        |          | [place], [place]     | [place]      |             |
|        |          | [address]          | [address]  [phone]  |             |
|        |          | [phone]        |  [phone] (Fax)  |             |
+--------+----------+---------------------+----------------------+-------------+
 
 
 
 Social History
 
 
+--------------+-------+-----------+--------+------+
| Tobacco Use  | Types | Packs/Day | Years  | Date |
|              |       |           | Used   |      |
+--------------+-------+-----------+--------+------+
| Never Smoker |       |           |        |      |
+--------------+-------+-----------+--------+------+
 
 
 
+---------------------+---+---+---+
| Smokeless Tobacco:  |   |   |   |
| Never Used          |   |   |   |
+---------------------+---+---+---+
 
 
 
+---------------------------------------------------------------+
| Comments: some second hand smoke exposure, but fairly minimal |
+---------------------------------------------------------------+
 
 
 
+-------------+-------------+---------+----------+
| Alcohol Use | Drinks/Week | oz/Week | Comments |
+-------------+-------------+---------+----------+
| No          |             |         |          |
+-------------+-------------+---------+----------+
 
 
 
 
+------------------+---------------+
| Sex Assigned at  | Date Recorded |
| Birth            |               |
+------------------+---------------+
| Not on file      |               |
+------------------+---------------+
 
 
 
+----------------+-------------+-------------+
| Job Start Date | Occupation  | Industry    |
+----------------+-------------+-------------+
| Not on file    | Not on file | Not on file |
+----------------+-------------+-------------+
 
 
 
+----------------+--------------+------------+
| Travel History | Travel Start | Travel End |
+----------------+--------------+------------+
 
 
 
+-------------------------------------+
| No recent travel history available. |
+-------------------------------------+
 documented as of this encounter
 
 Progress Notes
 Cherelle Julian RN - 10/19/2015  2:07 PM PDTPatient says she has been treated twice 
lately for a UTI.  She finished her last antibiotics on about 10/12/15.  Patient does not ha
ve burning or pain as she had, denies urgency.  All symptoms have cleared expect low back pa
in.Electronically signed by Cherelle Julian RN at 10/19/2015  2:14 PM PDTdocumented in
 this encounter
 
 Plan of Treatment
 
 
+--------+-----------+------------+----------------------+-------------------+
| Date   | Type      | Specialty  | Care Team            | Description       |
+--------+-----------+------------+----------------------+-------------------+
| / | Office    | Cardiology |   Tonia Wilson,    |                   |
|    | Visit     |            | ARNJESSICA  [address]   |                   |
|        |           |            | St  [place], [place]  |                   |
|        |           |            | [address]  [phone]  |                   |
|        |           |            |  [phone] (Fax)  |                   |
+--------+-----------+------------+----------------------+-------------------+
| / | Hospital  | Radiology  |   Popeye Townsend, |                   |
|    | Encounter |            |  MD  [address] |                   |
|        |           |            |  St.  [place],   |                   |
|        |           |            | [place] [address]             |                   |
|        |           |            | [phone]         |                   |
|        |           |            | [phone] (Fax)   |                   |
+--------+-----------+------------+----------------------+-------------------+
| / | Surgery   | Radiology  |   Popeye Townsend, | CV EP PPM SYSTEM  |
|    |           |            |  MD  [address] | IMPLANT           |
|        |           |            |  St.  [place],   |                   |
|        |           |            | [place] [address]             |                   |
|        |           |            | [phone]         |                   |
 
|        |           |            | [phone] (Fax)   |                   |
+--------+-----------+------------+----------------------+-------------------+
| 02/10/ | Clinical  | Cardiology |                      |                   |
|    | Support   |            |                      |                   |
+--------+-----------+------------+----------------------+-------------------+
| / | Office    | Cardiology |   Tonia Wilson,    |                   |
|    | Visit     |            | [facility]  [address]   |                   |
|        |           |            | [place]  |                   |
|        |           |            | [address]  [phone]  |                   |
|        |           |            |  [phone] (Fax)  |                   |
+--------+-----------+------------+----------------------+-------------------+
| / | Off-Site  | Nephrology |   Marzena Moser  |                   |
|    | Visit     |            | DO ETIENNE  [address] [place] [address]      |                   |
|        |           |            | [address]      |                   |
|        |           |            | [place]      |                   |
|        |           |            | [address]  [phone]  |                   |
|        |           |            |  [phone] (Fax)  |                   |
+--------+-----------+------------+----------------------+-------------------+
 documented as of this encounter
 
 Procedures
 
 
+----------------------+--------+------------+----------------------+----------------------+
| Procedure Name       | Priori | Date/Time  | Associated Diagnosis | Comments             |
|                      | ty     |            |                      |                      |
+----------------------+--------+------------+----------------------+----------------------+
| EXTERNAL LAB:        | Routin | 10/16/2015 |                      |   Results for this   |
| URINALYSIS           | e      |            |                      | procedure are in the |
|                      |        |            |                      |  results section.    |
+----------------------+--------+------------+----------------------+----------------------+
| URINALYSIS W/CULTURE | Routin | 10/16/2015 |                      |   Results for this   |
|  IF INDICATED        | e      |            |                      | procedure are in the |
|                      |        |            |                      |  results section.    |
+----------------------+--------+------------+----------------------+----------------------+
 documented in this encounter
 
 Results
 Urinalysis w/Culture if Indicated (10/16/2015)
 
+-----------------+
| Specimen        |
+-----------------+
| Urine specimen  |
| (specimen)      |
+-----------------+
 
 
 
+--------------------------------------------------------------+----------------+
| Narrative                                                    | Performed At   |
+--------------------------------------------------------------+----------------+
|   Over 100,000 CFU/ML   Klebsiella pneumoniae ssp pneumoniae |   EXTERNAL LAB |
+--------------------------------------------------------------+----------------+
 
 
 
+-----------------+------------------+--------+----------------+
| Organism        | Antibiotic       | Method | Susceptibility |
+-----------------+------------------+--------+----------------+
 
| Klebsiella      | Amoxicillin +    |        |   Sensitive    |
| pneumoniae ssp  | Clavulanate      |        |                |
| pneumoniae      |                  |        |                |
+-----------------+------------------+--------+----------------+
| Klebsiella      | Aztreonam        |        |   Sensitive    |
| pneumoniae ssp  |                  |        |                |
| pneumoniae      |                  |        |                |
+-----------------+------------------+--------+----------------+
| Klebsiella      | Ciprofloxacin    |        |   Sensitive    |
| pneumoniae ssp  |                  |        |                |
| pneumoniae      |                  |        |                |
+-----------------+------------------+--------+----------------+
| Klebsiella      | Ceftriaxone      |        |   Sensitive    |
| pneumoniae ssp  |                  |        |                |
| pneumoniae      |                  |        |                |
+-----------------+------------------+--------+----------------+
| Klebsiella      | Ertapenem        |        |   Sensitive    |
| pneumoniae ssp  |                  |        |                |
| pneumoniae      |                  |        |                |
+-----------------+------------------+--------+----------------+
| Klebsiella      | Cefepime         |        |   Sensitive    |
| pneumoniae ssp  |                  |        |                |
| pneumoniae      |                  |        |                |
+-----------------+------------------+--------+----------------+
| Klebsiella      | Gentamicin       |        |   Sensitive    |
| pneumoniae ssp  |                  |        |                |
| pneumoniae      |                  |        |                |
+-----------------+------------------+--------+----------------+
| Klebsiella      | Imipenem         |        |   Sensitive    |
| pneumoniae ssp  |                  |        |                |
| pneumoniae      |                  |        |                |
+-----------------+------------------+--------+----------------+
| Klebsiella      | Levofloxacin     |        |   Sensitive    |
| pneumoniae ssp  |                  |        |                |
| pneumoniae      |                  |        |                |
+-----------------+------------------+--------+----------------+
| Klebsiella      | Meropenem        |        |   Sensitive    |
| pneumoniae ssp  |                  |        |                |
| pneumoniae      |                  |        |                |
+-----------------+------------------+--------+----------------+
| Klebsiella      | Trimethoprim +   |        |   Sensitive    |
| pneumoniae ssp  | Sulfamethoxazole |        |                |
| pneumoniae      |                  |        |                |
+-----------------+------------------+--------+----------------+
| Klebsiella      | Tetracycline     |        |   Sensitive    |
| pneumoniae ssp  |                  |        |                |
| pneumoniae      |                  |        |                |
+-----------------+------------------+--------+----------------+
| Klebsiella      | Piperacillin +   |        |   Sensitive    |
| pneumoniae ssp  | Tazobactam       |        |                |
| pneumoniae      |                  |        |                |
+-----------------+------------------+--------+----------------+
| Klebsiella      | Nitrofurantoin   |        |   Intermediate |
| pneumoniae ssp  |                  |        |                |
| pneumoniae      |                  |        |                |
+-----------------+------------------+--------+----------------+
| Klebsiella      | Ampicillin       |        |   Resistant    |
| pneumoniae ssp  |                  |        |                |
| pneumoniae      |                  |        |                |
+-----------------+------------------+--------+----------------+
 
 
 
 
+----------------+---------+--------------------+--------------+
| Performing     | Address | City/State/Zipcode | Phone Number |
| Organization   |         |                    |              |
+----------------+---------+--------------------+--------------+
|   EXTERNAL LAB |         |                    |              |
+----------------+---------+--------------------+--------------+
 External Lab: Urinalysis (10/16/2015)
 
+-------------+----------+-----------+------------+--------------+
| Component   | Value    | Ref Range | Performed  | Pathologist  |
|             |          |           | At         | Signature    |
+-------------+----------+-----------+------------+--------------+
| UA Blood,   | Negative |           | EXTERNAL   |              |
| External    |          |           | LAB        |              |
+-------------+----------+-----------+------------+--------------+
| UA Glucose, | Normal   |           | EXTERNAL   |              |
|  External   |          |           | LAB        |              |
+-------------+----------+-----------+------------+--------------+
| UA Ketones, | Negative |           | EXTERNAL   |              |
|  External   |          |           | LAB        |              |
+-------------+----------+-----------+------------+--------------+
| UA Ph,      | 7        |           | EXTERNAL   |              |
| External    |          |           | LAB        |              |
+-------------+----------+-----------+------------+--------------+
| UA          | 75       |           | EXTERNAL   |              |
| Proteins,   |          |           | LAB        |              |
| External    |          |           |            |              |
+-------------+----------+-----------+------------+--------------+
| UA RBC,     | 0        |           | EXTERNAL   |              |
| External    |          |           | LAB        |              |
+-------------+----------+-----------+------------+--------------+
| UA Specific | 1.014    |           | EXTERNAL   |              |
|  Gravity,   |          |           | LAB        |              |
| External    |          |           |            |              |
+-------------+----------+-----------+------------+--------------+
| UA          | 500      |           | EXTERNAL   |              |
| Leukocyte   |          |           | LAB        |              |
| Esterase,   |          |           |            |              |
| External    |          |           |            |              |
+-------------+----------+-----------+------------+--------------+
 
 
 
+--------------------------+
| Resulting Agency Comment |
+--------------------------+
|   [facility]              |
+--------------------------+
 
 
 
+----------------+---------+--------------------+--------------+
| Performing     | Address | City/State/Zipcode | Phone Number |
| Organization   |         |                    |              |
+----------------+---------+--------------------+--------------+
|   EXTERNAL LAB |         |                    |              |
+----------------+---------+--------------------+--------------+
 
 documented in this encounter
 
 Visit Diagnoses
 Not on filedocumented in this encounter

## 2020-01-08 NOTE — XMS
Encounter Summary
  Created on: 2020
 
 Viviana Ricci
 External Reference #: [id number]
 : 47
 Sex: Female
 
 Demographics
 
 
+-----------------------+----------------------+
| Address               | [address]      |
|                       | [place]  [address] |
+-----------------------+----------------------+
| Home Phone            | [phone]      |
+-----------------------+----------------------+
| Preferred Language    | Unknown              |
+-----------------------+----------------------+
| Marital Status        | Single               |
+-----------------------+----------------------+
| Buddhism Affiliation | 1009                 |
+-----------------------+----------------------+
| Race                  | Unknown              |
+-----------------------+----------------------+
| Ethnic Group          | Unknown              |
+-----------------------+----------------------+
 
 
 Author
 
 
+--------------+--------------------------------------------+
| Author       | [facility]  |
|              | and [facility]                                |
+--------------+--------------------------------------------+
| Organization | [facility]  |
|              | and [facility]                                |
+--------------+--------------------------------------------+
| Address      | Unknown                                    |
+--------------+--------------------------------------------+
| Phone        | Unavailable                                |
+--------------+--------------------------------------------+
 
 
 
 Support
 
 
+----------------+--------------+---------------------+-----------------+
| Name           | Relationship | Address             | Phone           |
+----------------+--------------+---------------------+-----------------+
| Ada/Ed Radhames | ECON         | [address]              | [phone] |
|                |              | [place]  |                 |
|                |              |  [id number]              |                 |
+----------------+--------------+---------------------+-----------------+
 
 
 
 
 Care Team Providers
 
 
+-----------------------+------+-------------+
| Care Team Member Name | Role | Phone       |
+-----------------------+------+-------------+
 PCP  | Unavailable |
+-----------------------+------+-------------+
 
 
 
 Encounter Details
 
 
+--------+-------------+---------------------+----------------------+-------------+
| Date   | Type        | Department          | Care Team            | Description |
+--------+-------------+---------------------+----------------------+-------------+
| / | Documentati |   [facility]         |   Marzena Moser  |             |
|    | on          | NEPHROLOGY  [address]   | M, DO  [address]      |             |
|        |             | [address]   | [address]      |             |
|        |             | [place]     | [place]      |             |
|        |             | [address]          | [address]  [phone]  |             |
|        |             | [phone]        |  [phone] (Fax)  |             |
+--------+-------------+---------------------+----------------------+-------------+
 
 
 
 Social History
 
 
+--------------+-------+-----------+--------+------+
| Tobacco Use  | Types | Packs/Day | Years  | Date |
|              |       |           | Used   |      |
+--------------+-------+-----------+--------+------+
| Never Smoker |       |           |        |      |
+--------------+-------+-----------+--------+------+
 
 
 
+---------------------+---+---+---+
| Smokeless Tobacco:  |   |   |   |
| Never Used          |   |   |   |
+---------------------+---+---+---+
 
 
 
+---------------------------------------------------------------+
| Comments: some second hand smoke exposure, but fairly minimal |
+---------------------------------------------------------------+
 
 
 
+-------------+-------------+---------+----------+
| Alcohol Use | Drinks/Week | oz/Week | Comments |
+-------------+-------------+---------+----------+
| No          |             |         |          |
+-------------+-------------+---------+----------+
 
 
 
 
+------------------+---------------+
| Sex Assigned at  | Date Recorded |
| Birth            |               |
+------------------+---------------+
| Not on file      |               |
+------------------+---------------+
 
 
 
+----------------+-------------+-------------+
| Job Start Date | Occupation  | Industry    |
+----------------+-------------+-------------+
| Not on file    | Not on file | Not on file |
+----------------+-------------+-------------+
 
 
 
+----------------+--------------+------------+
| Travel History | Travel Start | Travel End |
+----------------+--------------+------------+
 
 
 
+-------------------------------------+
| No recent travel history available. |
+-------------------------------------+
 documented as of this encounter
 
 Progress Notes
 Marzena Moser DO - 2015  3:16 PM PSTNEPHROLOGY
 Viviana had her standing order done. Her UA shows  WBC, with strongly (+) LE, and grew >
 100,00 klebsiella.  She denies fever or vomiting. Will start Cipro 250 mg, BID x 7 days , a
s she is on tacrolimus.  Will see her in Clinic next week.  
 
 Electronically signed by: Marzena Moser DO on 2015 at 15:18
 
 Electronically signed by Marzena Moser DO at 2015  3:21 PM PSTdocumented in thi
s encounter
 
 Plan of Treatment
 
 
+--------+-----------+------------+----------------------+-------------------+
| Date   | Type      | Specialty  | Care Team            | Description       |
+--------+-----------+------------+----------------------+-------------------+
| / | Office    | Cardiology |   Tonia Wilson,    |                   |
|    | Visit     |            | [address]   |                   |
|        |           |            | [place], [place]  |                   |
|        |           |            | [address]  [phone]  |                   |
|        |           |            |  [phone] (Fax)  |                   |
+--------+-----------+------------+----------------------+-------------------+
| / | Hospital  | Radiology  |   Popeye Townsend, |                   |
2020   | Encounter |            |  MD  [address] |                   |
|        |           |            |  [place],   |                   |
|        |           |            | [place] [address]             |                   |
|        |           |            | [phone]         |                   |
|        |           |            | [phone] (Fax)   |                   |
+--------+-----------+------------+----------------------+-------------------+
| / | Surgery   | Radiology  |   Popeye Townsend, | CV EP PPM SYSTEM  |
 
|    |           |            |  MD  [address] | IMPLANT           |
|        |           |            |  [place],   |                   |
|        |           |            | [place] [address]             |                   |
|        |           |            | [phone]         |                   |
|        |           |            | [phone] (Fax)   |                   |
+--------+-----------+------------+----------------------+-------------------+
| 02/10/ | Clinical  | Cardiology |                      |                   |
|    | Support   |            |                      |                   |
+--------+-----------+------------+----------------------+-------------------+
| / | Office    | Cardiology |   Tonia Wilson,    |                   |
|    | Visit     |            | [facility]  [address]   |                   |
|        |           |            | [place]  |                   |
|        |           |            | [address]  [phone]  |                   |
|        |           |            |  [phone] (Fax)  |                   |
+--------+-----------+------------+----------------------+-------------------+
| / | Off-Site  | Nephrology |   Marzena Moser  |                   |
|    | Visit     |            | DO [place]TI[place]  [address] [place] [address]      |                   |
|        |           |            | [address]      |                   |
|        |           |            | [place]      |                   |
|        |           |            | [address]  [phone]  |                   |
|        |           |            |  [phone] (Fax)  |                   |
+--------+-----------+------------+----------------------+-------------------+
 documented as of this encounter
 
 Visit Diagnoses
 
 
+------------------------------------------------------------------------------------------+
| Diagnosis                                                                                |
+------------------------------------------------------------------------------------------+
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
| stage IV, or unspecified(403.90) - Primary  Unspecified hypertensive kidney disease with |
|  chronic kidney disease stage I through stage IV, or unspecified                         |
+------------------------------------------------------------------------------------------+
|   UTI (lower urinary tract infection)  Urinary tract infection, site not specified       |
+------------------------------------------------------------------------------------------+
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
+------------------------------------------------------------------------------------------+
 documented in this encounter

## 2020-01-08 NOTE — XMS
Encounter Summary
  Created on: 2020
 
 Ras Hardy
 External Reference #: [id number]
 : 47
 Sex: Female
 
 Demographics
 
 
+-----------------------+----------------------+
| Address               | [address]      |
|                       | [place]  [address] |
+-----------------------+----------------------+
| Home Phone            | [phone]      |
+-----------------------+----------------------+
| Preferred Language    | Unknown              |
+-----------------------+----------------------+
| Marital Status        | Single               |
+-----------------------+----------------------+
| Jain Affiliation | [id number]                 |
+-----------------------+----------------------+
| Race                  | Unknown              |
+-----------------------+----------------------+
| Ethnic Group          | Unknown              |
+-----------------------+----------------------+
 
 
 Author
 
 
+--------------+--------------------------------------------+
| Author       | [facility]  |
|              | and [facility]                                |
+--------------+--------------------------------------------+
| Organization | [facility]  |
|              | and [facility]                                |
+--------------+--------------------------------------------+
| Address      | Unknown                                    |
+--------------+--------------------------------------------+
| Phone        | Unavailable                                |
+--------------+--------------------------------------------+
 
 
 
 Support
 
 
+----------------+--------------+---------------------+-----------------+
| Name           | Relationship | Address             | Phone           |
+----------------+--------------+---------------------+-----------------+
| Ada/Ed Radhames | ECON         | [address]              | [phone] |
|                |              | [place], [place]  |                 |
|                |              |  [id number]              |                 |
+----------------+--------------+---------------------+-----------------+
 
 
 
 
 Care Team Providers
 
 
+-----------------------+------+-------------+
| Care Team Member Name | Role | Phone       |
+-----------------------+------+-------------+
 PCP  | Unavailable |
+-----------------------+------+-------------+
 
 
 
 Encounter Details
 
 
+--------+-----------+----------------------+----------------------+-----------------+
| Date   | Type      | Department           | Care Team            | Description     |
+--------+-----------+----------------------+----------------------+-----------------+
| 01/15/ | [facility]  |   [facility] |   Edgar Sanders,   | Left hip pain;  |
|    | Encounter |  MED CTR XRAY  [address] | MD  [address] [facility]     | Knee pain;      |
|        |           |  [address]       | [place], [place]      | Leg pain        |
|        |           | [place] [address] | [address]  [phone]  |                 |
|        |           |   [phone]       |  [phone] (Fax)  |                 |
+--------+-----------+----------------------+----------------------+-----------------+
 
 
 
 Social History
 
 
+--------------+-------+-----------+--------+------+
| Tobacco Use  | Types | Packs/Day | Years  | Date |
|              |       |           | Used   |      |
+--------------+-------+-----------+--------+------+
| Never Smoker |       |           |        |      |
+--------------+-------+-----------+--------+------+
 
 
 
+---------------------+---+---+---+
| Smokeless Tobacco:  |   |   |   |
| Never Used          |   |   |   |
+---------------------+---+---+---+
 
 
 
+---------------------------------------------------------------+
| Comments: some second hand smoke exposure, but fairly minimal |
+---------------------------------------------------------------+
 
 
 
+-------------+-------------+---------+----------+
| Alcohol Use | Drinks/Week | oz/Week | Comments |
+-------------+-------------+---------+----------+
| No          |             |         |          |
+-------------+-------------+---------+----------+
 
 
 
 
+------------------+---------------+
| Sex Assigned at  | Date Recorded |
| Birth            |               |
+------------------+---------------+
| Not on file      |               |
+------------------+---------------+
 
 
 
+----------------+-------------+-------------+
| Job Start Date | Occupation  | Industry    |
+----------------+-------------+-------------+
| Not on file    | Not on file | Not on file |
+----------------+-------------+-------------+
 
 
 
+----------------+--------------+------------+
| Travel History | Travel Start | Travel End |
+----------------+--------------+------------+
 
 
 
+-------------------------------------+
| No recent travel history available. |
+-------------------------------------+
 documented as of this encounter
 
 Medications at Time of Discharge
 
 
+----------------------+----------------------+-----------+---------+----------+-----------+
| Medication           | Sig                  | Dispensed | Refills | Start    | End Date  |
|                      |                      |           |         | Date     |           |
+----------------------+----------------------+-----------+---------+----------+-----------+
|   glucose blood VI   | Check blood sugar    |   100     | 12      | 20 |           |
| test strips (ONE     | before each meal and | each      |         | 12       |           |
| TOUCH ULTRA TEST)    |  as directed         |           |         |          |           |
| stripIndications:    |                      |           |         |          |           |
| Unspecified          |                      |           |         |          |           |
| hypertensive kidney  |                      |           |         |          |           |
| disease with chronic |                      |           |         |          |           |
|  kidney disease      |                      |           |         |          |           |
| stage I through      |                      |           |         |          |           |
| stage IV, or         |                      |           |         |          |           |
| unspecified(403.90), |                      |           |         |          |           |
|  Complications of    |                      |           |         |          |           |
| transplanted kidney, |                      |           |         |          |           |
|  Diabetes mellitus   |                      |           |         |          |           |
| type II,             |                      |           |         |          |           |
| uncontrolled (HCC),  |                      |           |         |          |           |
| Hyperlipidemia       |                      |           |         |          |           |
+----------------------+----------------------+-----------+---------+----------+-----------+
|   Respiratory        | Decrease CPAP to     |   1 each  | 0       | 20 |           |
| Therapy Supplies     | 12-18 cmH2O          |           |         | 13       |           |
| MISC                 | Diagnosis            |           |         |          |           |
|                      | Code(s)327.23.       |           |         |          |           |
|                      | Please send order to |           |         |          |           |
|                      |  [facility].     |           |         |          |           |
+----------------------+----------------------+-----------+---------+----------+-----------+
 
|   albuterol (PROAIR  | Inhale 2 puffs into  |   1       | 2       | 20 |  |
| HFA) 90 mcg/puff     | the lungs every 6    | Inhaler   |         | 13       | 4         |
| inhalerIndications:  | hours as needed for  |           |         |          |           |
| Cough                | Wheezing.            |           |         |          |           |
+----------------------+----------------------+-----------+---------+----------+-----------+
|   allopurinol        | Take 1 tablet by     |   30      | 5       | 20 | 02/10/201 |
| (ZYLOPRIM) 100 mg    | mouth Daily.         | tablet    |         | 13       | 4         |
| tablet               |                      |           |         |          |           |
+----------------------+----------------------+-----------+---------+----------+-----------+
|   aspirin 81 MG EC   | Take 81 mg by mouth  |           | 0       | 20 |  |
| tablet               | Daily.               |           |         | 12       | 5         |
+----------------------+----------------------+-----------+---------+----------+-----------+
|   cephalexin         | Take 1 capsule by    |   6       | 0       | 20 |  |
| (KEFLEX) 500 mg      | mouth 2 times daily. | capsule   |         | 13       | 4         |
| capsule              |                      |           |         |          |           |
+----------------------+----------------------+-----------+---------+----------+-----------+
|   Cholecalciferol    | Take 5,000 Units by  |           | 0       | 20 |  |
| (VITAMIN D3) 5000    | mouth Once a week.   |           |         | 12       | 4         |
| UNITS CAPS           |                      |           |         |          |           |
+----------------------+----------------------+-----------+---------+----------+-----------+
|   cinacalcet         | Take 1 tablet by     |   30      | 12      | 20 |  |
| (SENSIPAR) 30 mg     | mouth Daily.         | tablet    |         | 13       | 4         |
| tablet               |                      |           |         |          |           |
+----------------------+----------------------+-----------+---------+----------+-----------+
|   fludrocortisone    | Take 1 tablet by     |   45      | 2       | 20 |  |
| (FLORINEF) 0.1 mg    | mouth Every other    | tablet    |         | 14       | 5         |
| tablet               | day.                 |           |         |          |           |
+----------------------+----------------------+-----------+---------+----------+-----------+
|   flunisolide        | 2 sprays by Nasal    |   25 mL   | 12      | 20 |  |
| (NASAREL) 29 MCG/ACT | route Daily. Dose is |           |         | 13       | 4         |
|  (0.025%) nasal      |  for each nostril.   |           |         |          |           |
| spray                |                      |           |         |          |           |
+----------------------+----------------------+-----------+---------+----------+-----------+
|   fluticasone        | Inhale 1 puff into   |   1       | 11      | 20 |  |
| (FLOVENT HFA) 220    | the lungs 2 times    | Inhaler   |         | 13       | 5         |
| mcg/puff inhaler     | daily. Rinse mouth   |           |         |          |           |
|                      | after use.           |           |         |          |           |
+----------------------+----------------------+-----------+---------+----------+-----------+
|   furosemide (LASIX) | Take 1 tablet by     |   30      | 5       | 20 |  |
|  40 mg tablet        | mouth Daily.         | tablet    |         | 13       | 4         |
+----------------------+----------------------+-----------+---------+----------+-----------+
|   insulin glargine   | Inject 20 Units      |   10 mL   | 0       | 20 |  |
| (LANTUS) 100         | under the skin every |           |         | 13       | 4         |
| units/mL             |  morning.            |           |         |          |           |
| injectionIndications |                      |           |         |          |           |
| : Unspecified        |                      |           |         |          |           |
| hypertensive kidney  |                      |           |         |          |           |
| disease with chronic |                      |           |         |          |           |
|  kidney disease      |                      |           |         |          |           |
| stage I through      |                      |           |         |          |           |
| stage IV, or         |                      |           |         |          |           |
| unspecified(403.90), |                      |           |         |          |           |
|  Complications of    |                      |           |         |          |           |
| transplanted kidney, |                      |           |         |          |           |
|  Diabetes mellitus   |                      |           |         |          |           |
| type II,             |                      |           |         |          |           |
| uncontrolled (HCC),  |                      |           |         |          |           |
| Hyperlipidemia       |                      |           |         |          |           |
+----------------------+----------------------+-----------+---------+----------+-----------+
|   insulin lispro     | Inject               |   10 pen  | 5       | 20 |  |
 
| (HUMALOG) 100        | subcutaneously       |           |         | 13       | 4         |
| units/mL injection   | before meals         |           |         |          |           |
|                      | according to sliding |           |         |          |           |
|                      |  scale               |           |         |          |           |
+----------------------+----------------------+-----------+---------+----------+-----------+
|   Insulin            | Use before meals and |   100     | 11      | 20 |  |
| Syringe-Needle U-100 |  as directed.        | each      |         | 13       | 4         |
|  (BD INSULIN SYRINGE |                      |           |         |          |           |
|  ULTRAFINE) 31G X    |                      |           |         |          |           |
| " 0.5 ML MISC    |                      |           |         |          |           |
+----------------------+----------------------+-----------+---------+----------+-----------+
|   levothyroxine      | Take 1 tablet by     |   30      | 11      | 20 | 10/06/201 |
| (LEVOTHROID) 50 mcg  | mouth Daily.         | tablet    |         | 13       | 4         |
| tablet               |                      |           |         |          |           |
+----------------------+----------------------+-----------+---------+----------+-----------+
|   lisinopril         | Take 1 tablet by     |   90      | 3       | 20 |  |
| (PRINIVIL,ZESTRIL)   | mouth Daily.         | tablet    |         | 13       | 4         |
| 30 MG tablet         |                      |           |         |          |           |
+----------------------+----------------------+-----------+---------+----------+-----------+
|   loperamide         | Take 2 mg by mouth   |           | 0       | 20 |  |
| (ANTI-DIARRHEAL) 2   | Daily as needed.     |           |         | 12       | 4         |
| mg capsule           |                      |           |         |          |           |
+----------------------+----------------------+-----------+---------+----------+-----------+
|   magnesium oxide    | Take 1 tablet by     |   62      | 12      | 20 |  |
| (MAG-OX) 400 mg      | mouth 2 times daily. | tablet    |         | 13       | 4         |
| tablet               |                      |           |         |          |           |
+----------------------+----------------------+-----------+---------+----------+-----------+
|   metoprolol         | Take 1 tablet by     |   60      | 11      | 20 |  |
| tartrate (LOPRESSOR) | mouth 2 times daily. | tablet    |         | 13       | 4         |
|  25 mg tablet        |                      |           |         |          |           |
+----------------------+----------------------+-----------+---------+----------+-----------+
|   mycophenolate      | Take 250 mg by mouth |           | 0       | 20 | 10/14/201 |
| (CELLCEPT) 250 mg    |  3 times daily.      |           |         | 11       | 5         |
| capsule              |                      |           |         |          |           |
+----------------------+----------------------+-----------+---------+----------+-----------+
|   omeprazole         | Take one capsule by  |   90      | 3       | 20 |  |
| (PRILOSEC) 20 mg     | mouth once daily on  | capsule   |         | 13       | 4         |
| capsule              | an empty stomach     |           |         |          |           |
+----------------------+----------------------+-----------+---------+----------+-----------+
|   predniSONE         | Take 1 tablet by     |   90      | 3       | 20 |  |
| (DELTASONE) 5 mg     | mouth Daily.         | tablet    |         | 12       | 4         |
| tablet               |                      |           |         |          |           |
+----------------------+----------------------+-----------+---------+----------+-----------+
|   Prenatal           | Take 0.8 mg by mouth |   30 each | 11      | 20 |  |
| Multivit-Min-Fe-FA   |  Daily.              |           |         | 13       | 4         |
| (PRENATAL VITAMINS)  |                      |           |         |          |           |
| 0.8 MG TABS          |                      |           |         |          |           |
+----------------------+----------------------+-----------+---------+----------+-----------+
|   Prenatal Vit-Fe    |                      |           | 0       | 20 |  |
| Fumarate-FA (PNV     |                      |           |         | 13       | 4         |
| PRENATAL PLUS        |                      |           |         |          |           |
| MULTIVITAMIN) 27-1   |                      |           |         |          |           |
| MG TABS              |                      |           |         |          |           |
+----------------------+----------------------+-----------+---------+----------+-----------+
|   rosuvastatin       | Take 1 tablet by     |   30      | 11      | 20 |  |
| (CRESTOR) 20 mg      | mouth nightly.       | tablet    |         | 13       | 4         |
| tablet               |                      |           |         |          |           |
+----------------------+----------------------+-----------+---------+----------+-----------+
|   tacrolimus         | TAD.                 |           | 0       | 20 | 07/15/201 |
| (PROGRAF) 0.5 mg     |                      |           |         | 12       | 4         |
 
| capsuleIndications:  |                      |           |         |          |           |
| Unspecified          |                      |           |         |          |           |
| hypertensive kidney  |                      |           |         |          |           |
| disease with chronic |                      |           |         |          |           |
|  kidney disease      |                      |           |         |          |           |
| stage I through      |                      |           |         |          |           |
| stage IV, or         |                      |           |         |          |           |
| unspecified(403.90), |                      |           |         |          |           |
|  Complications of    |                      |           |         |          |           |
| transplanted kidney, |                      |           |         |          |           |
|  Diabetes mellitus   |                      |           |         |          |           |
| type II,             |                      |           |         |          |           |
| uncontrolled (HCC),  |                      |           |         |          |           |
| Hyperlipidemia       |                      |           |         |          |           |
+----------------------+----------------------+-----------+---------+----------+-----------+
|   tacrolimus         | Take 1.5 mg by mouth |           | 0       | 20 |  |
| (PROGRAF) 1 mg       |  2 times daily.      |           |         | 13       | 4         |
| capsuleIndications:  |                      |           |         |          |           |
| Unspecified          |                      |           |         |          |           |
| hypertensive kidney  |                      |           |         |          |           |
| disease with chronic |                      |           |         |          |           |
|  kidney disease      |                      |           |         |          |           |
| stage I through      |                      |           |         |          |           |
| stage IV, or         |                      |           |         |          |           |
| unspecified(403.90), |                      |           |         |          |           |
|  FSGS (focal         |                      |           |         |          |           |
| segmental            |                      |           |         |          |           |
| glomerulosclerosis), |                      |           |         |          |           |
|  Hyperlipidemia,     |                      |           |         |          |           |
| Complications of     |                      |           |         |          |           |
| transplanted kidney  |                      |           |         |          |           |
+----------------------+----------------------+-----------+---------+----------+-----------+
|   tacrolimus         | Take 1 capsule by    |   62      | 12      | 20 |  |
| (PROGRAF) 1 mg       | mouth 2 times daily. | capsule   |         | 13       | 4         |
| capsuleIndications:  |                      |           |         |          |           |
| Unspecified          |                      |           |         |          |           |
| hypertensive kidney  |                      |           |         |          |           |
| disease with chronic |                      |           |         |          |           |
|  kidney disease      |                      |           |         |          |           |
| stage I through      |                      |           |         |          |           |
| stage IV, or         |                      |           |         |          |           |
| unspecified(403.90), |                      |           |         |          |           |
|  Complications of    |                      |           |         |          |           |
| transplanted kidney, |                      |           |         |          |           |
|  Diabetes mellitus   |                      |           |         |          |           |
| type II,             |                      |           |         |          |           |
| uncontrolled (HCC),  |                      |           |         |          |           |
| Hyperlipidemia       |                      |           |         |          |           |
+----------------------+----------------------+-----------+---------+----------+-----------+
|   valsartan (DIOVAN) | Take 1 tablet by     |   30      | 11      | 20 |  |
|  160 mg tablet       | mouth Daily.         | tablet    |         | 13       | 4         |
+----------------------+----------------------+-----------+---------+----------+-----------+
 documented as of this encounter
 
 Plan of Treatment
 
 
+--------+-----------+------------+----------------------+-------------------+
| Date   | Type      | Specialty  | Care Team            | Description       |
+--------+-----------+------------+----------------------+-------------------+
 
| / | Office    | Cardiology |   Tonia Wilson,    |                   |
|    | Visit     |            | [address]   |                   |
|        |           |            | [place], [place]  |                   |
|        |           |            | [address]  [phone]  |                   |
|        |           |            |  [phone] (Fax)  |                   |
+--------+-----------+------------+----------------------+-------------------+
| / | Hospital  | Radiology  |   Popeye Townsend, |                   |
|    | Encounter |            |  MD  [address] |                   |
|        |           |            |  St.  [place],   |                   |
|        |           |            | [place] [address]             |                   |
|        |           |            | [phone]         |                   |
|        |           |            | [phone] (Fax)   |                   |
+--------+-----------+------------+----------------------+-------------------+
| / | Surgery   | Radiology  |   Popeye Townsend, | CV EP PPM SYSTEM  |
|    |           |            |  MD  [address] | IMPLANT           |
|        |           |            |  St.  [place],   |                   |
|        |           |            | [place] [address]             |                   |
|        |           |            | [phone]         |                   |
|        |           |            | [phone] (Fax)   |                   |
+--------+-----------+------------+----------------------+-------------------+
| 02/10/ | Clinical  | Cardiology |                      |                   |
|    | Support   |            |                      |                   |
+--------+-----------+------------+----------------------+-------------------+
| / | Office    | Cardiology |   Tonia Wilson,    |                   |
|    | Visit     |            | ARNP  [address]   |                   |
|        |           |            | St  [place], [place]  |                   |
|        |           |            | [address]  [phone]  |                   |
|        |           |            |  [phone] (Fax)  |                   |
+--------+-----------+------------+----------------------+-------------------+
| / | Off-Site  | Nephrology |   Marzena Moser  |                   |
|    | Visit     |            | DO ETIENNE  [address]      |                   |
|        |           |            | [address]      |                   |
|        |           |            | [place]      |                   |
|        |           |            | [address]  [phone]  |                   |
|        |           |            |  [phone] (Fax)  |                   |
+--------+-----------+------------+----------------------+-------------------+
 
 
 
+--------------------+---------+--------+----------------------+---------------------+
| Name               | Type    | Priori | Associated Diagnoses | Order Schedule      |
|                    |         | ty     |                      |                     |
+--------------------+---------+--------+----------------------+---------------------+
| XR Hip Left 2 + Vw | Imaging | Routin |   Left hip pain      | 1 Occurrences       |
|                    |         | e      |                      | starting 01/15/2014 |
+--------------------+---------+--------+----------------------+---------------------+
 documented as of this encounter
 
 Procedures
 
 
+-------------------+--------+-------------+----------------------+----------------------+
| Procedure Name    | Priori | Date/Time   | Associated Diagnosis | Comments             |
|                   | ty     |             |                      |                      |
+-------------------+--------+-------------+----------------------+----------------------+
| XR KNEE LEFT 3 VW | Routin | 01/15/2014  |   Knee pain  Leg     |   Results for this   |
|                   | e      |  5:14 PM    | pain                 | procedure are in the |
|                   |        | PST         |                      |  results section.    |
+-------------------+--------+-------------+----------------------+----------------------+
 documented in this encounter
 
 
 Results
 XR Knee Left 3 Vw (01/15/2014  5:14 PM PST)
 
+----------+
| Specimen |
+----------+
|          |
+----------+
 
 
 
+------------------------------------------------------------------------+-----------------+
| Narrative                                                              | Performed At    |
+------------------------------------------------------------------------+-----------------+
|    [facility]      Diagnostic Imaging          |   [place]    |
| Department      [address] [place] [address]      [phone]    | [facility]        |
|                             [   rep [address]]     [   rep [facility]  |
| st zip]     **** Signed ****                                           | - IMAGING       |
| ______________________________________________________________________ |                 |
| ______________________     Patient Name: RAS HARDY   Physician:     |                 |
| .01     : 1945    Age: 68   Sex: F     Unit #: [id number]    |                 |
|   Exam Date: 01/15/14     Acct #: [id number]   [place] [facility] [place]     |                 |
|     Report #: [id number]                      Page:                     |                 |
|   %(RAD)RES..mtdd.print.filter("pg") of   %(RAD)                       |                 |
| RES..mtdd.print.filter("tpg")                                          |                 |
| ______________________________________________________________________ |                 |
| ______________________     Accession Number: [id number]                |                 |
| LEFT KNEE X-RAY              CLINICAL HISTORY:   LEFT KNEE PAIN, LEG   |                 |
| PAIN.               COMPARISON: None.               FINDINGS:   Three  |                 |
| views of the left knee were obtained.               There are no acute |                 |
|  osseous abnormalities. Diffuse osteopenia is present. There is a bone |                 |
|  island in   the proximal tibia. Moderate to severe lateral            |                 |
| compartment joint space loss is present. There is   chondrocalcinosis  |                 |
| of the medial and lateral menisci. The lateral meniscus appears to be  |                 |
| extruded. Mild   osteophytosis is present of the lateral compartment.  |                 |
| A moderate knee joint effusion is seen. Mild   atherosclerosis is      |                 |
| present.               Limited evaluation of the right knee            |                 |
| demonstrates hardware for arthroplasty as well as ORIF hardware   in   |                 |
| the femoral shaft.               IMPRESSION:       1. DEGENERATIVE     |                 |
| CHANGES OF THE LEFT KNEE AS DESCRIBED ABOVE INCLUDING MODERATE TO      |                 |
| SEVERE LATERAL   COMPARTMENT JOINT SPACE LOSS.               2.        |                 |
| MODERATE KNEE JOINT EFFUSION.               Dictated Date/Time:        |                 |
|   01/15/2014 17:14      Transcribed Date/Time:   01/15/2014 18:00      |                 |
|  :                         <<Signature on File>>  |                 |
|     -----------------------------------------------------------        |                 |
| Derek Reed MD01/15/14 2218     <Electronically signed by Derek Reed   |                 |
| MD>               Derek Reed MD 01/15/14 1714     :   |                 |
| [facility]63/15/14 1800               Edgar Sanders MD    |                 |
|                                                                        |                 |
+------------------------------------------------------------------------+-----------------+
 
 
 
+----------------------+---------------------+--------------------+----------------+
| Performing           | Address             | City/State/Zipcode | Phone Number   |
| Organization         |                     |                    |                |
+----------------------+---------------------+--------------------+----------------+
|   [facility]     |   [address] | [place]    |   [phone] |
| [facility]  |                     | [address]              |                |
 
| - IMAGING            |                     |                    |                |
+----------------------+---------------------+--------------------+----------------+
 documented in this encounter
 
 Visit Diagnoses
 
 
+---------------------------------------------------------+
| Diagnosis                                               |
+---------------------------------------------------------+
|   Left hip pain  Pain in joint, pelvic region and thigh |
+---------------------------------------------------------+
|   Knee pain  Pain in joint, lower leg                   |
+---------------------------------------------------------+
|   Leg pain  Pain in limb                                |
+---------------------------------------------------------+
 documented in this encounter

## 2020-01-08 NOTE — XMS
Encounter Summary
  Created on: 2020
 
 Viviana Ricci
 External Reference #: [id number]
 : 47
 Sex: Female
 
 Demographics
 
 
+-----------------------+----------------------+
| Address               | [address]      |
|                       | [place]  [address] |
+-----------------------+----------------------+
| Home Phone            | [phone]      |
+-----------------------+----------------------+
| Preferred Language    | Unknown              |
+-----------------------+----------------------+
| Marital Status        | Single               |
+-----------------------+----------------------+
| Mu-ism Affiliation | [id number]                 |
+-----------------------+----------------------+
| Race                  | Unknown              |
+-----------------------+----------------------+
| Ethnic Group          | Unknown              |
+-----------------------+----------------------+
 
 
 Author
 
 
+--------------+--------------------------------------------+
| Author       | [facility]  |
|              | and [place]                                |
+--------------+--------------------------------------------+
| Organization | [facility]  |
|              | and [place]                                |
+--------------+--------------------------------------------+
| Address      | Unknown                                    |
+--------------+--------------------------------------------+
| Phone        | Unavailable                                |
+--------------+--------------------------------------------+
 
 
 
 Support
 
 
+----------------+--------------+---------------------+-----------------+
| Name           | Relationship | Address             | Phone           |
+----------------+--------------+---------------------+-----------------+
| Ada/Ed Radhames | ECON         | [place]              | [phone] |
|                |              | [place]  |                 |
|                |              |  [phone]              |                 |
+----------------+--------------+---------------------+-----------------+
 
 
 
 
 Care Team Providers
 
 
+-----------------------+------+-------------+
| Care Team Member Name | Role | Phone       |
+-----------------------+------+-------------+
 PCP  | Unavailable |
+-----------------------+------+-------------+
 
 
 
 Reason for Visit
 
 
+-------------------+----------+
| Reason            | Comments |
+-------------------+----------+
| Medication Refill |          |
+-------------------+----------+
 
 
 
 Encounter Details
 
 
+--------+--------+---------------------+----------------------+-------------------+
| Date   | Type   | Department          | Care Team            | Description       |
+--------+--------+---------------------+----------------------+-------------------+
| / | Refill |   [place]         |   [place],       | Medication Refill |
| 2018   |        | NEPHROLOGY  [address]   | BELKIS [address]  [address] |                   |
|        |        | [address]   |  [address]    |                   |
|        |        | [place], [place]     | [address]  [place], [place] |                   |
|        |        | [address]          |  [address]  [phone] |                   |
|        |        | [phone]        |   [phone] (Fax) |                   |
+--------+--------+---------------------+----------------------+-------------------+
 
 
 
 Social History
 
 
+--------------+-------+-----------+--------+------+
| Tobacco Use  | Types | Packs/Day | Years  | Date |
|              |       |           | Used   |      |
+--------------+-------+-----------+--------+------+
| Never Smoker |       |           |        |      |
+--------------+-------+-----------+--------+------+
 
 
 
+---------------------+---+---+---+
| Smokeless Tobacco:  |   |   |   |
| Never Used          |   |   |   |
+---------------------+---+---+---+
 
 
 
+---------------------------------------------------------------+
| Comments: some second hand smoke exposure, but fairly minimal |
 
+---------------------------------------------------------------+
 
 
 
+-------------+-------------+---------+----------+
| Alcohol Use | Drinks/Week | oz/Week | Comments |
+-------------+-------------+---------+----------+
| No          |             |         |          |
+-------------+-------------+---------+----------+
 
 
 
+------------------+---------------+
| Sex Assigned at  | Date Recorded |
| Birth            |               |
+------------------+---------------+
| Not on file      |               |
+------------------+---------------+
 
 
 
+----------------+-------------+-------------+
| Job Start Date | Occupation  | Industry    |
+----------------+-------------+-------------+
| Not on file    | Not on file | Not on file |
+----------------+-------------+-------------+
 
 
 
+----------------+--------------+------------+
| Travel History | Travel Start | Travel End |
+----------------+--------------+------------+
 
 
 
+-------------------------------------+
| No recent travel history available. |
+-------------------------------------+
 documented as of this encounter
 
 Plan of Treatment
 
 
+--------+-----------+------------+----------------------+-------------------+
| Date   | Type      | Specialty  | Care Team            | Description       |
+--------+-----------+------------+----------------------+-------------------+
| / | Office    | Cardiology |   Tonia Wilson,    |                   |
|    | Visit     |            | ARNJESSICA  [address]   |                   |
|        |           |            | St  [place], [place]  |                   |
|        |           |            | [address]  [phone]  |                   |
|        |           |            |  [phone] (Fax)  |                   |
+--------+-----------+------------+----------------------+-------------------+
| / | Hospital  | Radiology  |   Popeye Townsend, |                   |
2020   | Encounter |            |  MD  [address] |                   |
|        |           |            |  St.  [place],   |                   |
|        |           |            | [place] [address]             |                   |
|        |           |            | [phone]         |                   |
|        |           |            | [phone] (Fax)   |                   |
+--------+-----------+------------+----------------------+-------------------+
| / | Surgery   | Radiology  |   Popeye Townsend, | CV EP PPM SYSTEM  |
 
|    |           |            |  MD  [address] | IMPLANT           |
|        |           |            |  St.  [place],   |                   |
|        |           |            | [place] [address]             |                   |
|        |           |            | [phone]         |                   |
|        |           |            | [phone] (Fax)   |                   |
+--------+-----------+------------+----------------------+-------------------+
| 02/10/ | Clinical  | Cardiology |                      |                   |
2020   | Support   |            |                      |                   |
+--------+-----------+------------+----------------------+-------------------+
| / | Office    | Cardiology |   Tonia Wilson,    |                   |
|    | Visit     |            | ARNP  [address]   |                   |
|        |           |            | [place]  |                   |
|        |           |            | [address]  [phone]  |                   |
|        |           |            |  [phone] (Fax)  |                   |
+--------+-----------+------------+----------------------+-------------------+
| / | Off-Site  | Nephrology |   Marzena Moser  |                   |
|    | Visit     |            | DO ETIENNE  [facility]      |                   |
|        |           |            | [address]      |                   |
|        |           |            | [place]      |                   |
|        |           |            | [address]  [phone]  |                   |
|        |           |            |  [phone] (Fax)  |                   |
+--------+-----------+------------+----------------------+-------------------+
 documented as of this encounter
 
 Visit Diagnoses
 Not on filedocumented in this encounter

## 2020-01-08 NOTE — XMS
Encounter Summary
  Created on: 2020
 
 Viviana Ricci
 External Reference #: [id number]
 : 47
 Sex: Female
 
 Demographics
 
 
+-----------------------+----------------------+
| Address               | [address]      |
|                       | [place]  [address] |
+-----------------------+----------------------+
| Home Phone            | [phone]      |
+-----------------------+----------------------+
| Preferred Language    | Unknown              |
+-----------------------+----------------------+
| Marital Status        | Single               |
+-----------------------+----------------------+
| Tenriism Affiliation | [id number]                 |
+-----------------------+----------------------+
| Race                  | Unknown              |
+-----------------------+----------------------+
| Ethnic Group          | Unknown              |
+-----------------------+----------------------+
 
 
 Author
 
 
+--------------+--------------------------------------------+
| Author       | [facility]  |
|              | and [place]                                |
+--------------+--------------------------------------------+
| Organization | [facility]  |
|              | and [place]                                |
+--------------+--------------------------------------------+
| Address      | Unknown                                    |
+--------------+--------------------------------------------+
| Phone        | Unavailable                                |
+--------------+--------------------------------------------+
 
 
 
 Support
 
 
+----------------+--------------+---------------------+-----------------+
| Name           | Relationship | Address             | Phone           |
+----------------+--------------+---------------------+-----------------+
| Ada/Ed Radhames | ECON         | [place]              | [phone] |
|                |              | [place]  |                 |
|                |              |  [address]              |                 |
+----------------+--------------+---------------------+-----------------+
 
 
 
 
 Care Team Providers
 
 
+-----------------------+------+-------------+
| Care Team Member Name | Role | Phone       |
+-----------------------+------+-------------+
 PCP  | Unavailable |
+-----------------------+------+-------------+
 
 
 
 Reason for Visit
 
 
+-------------------+----------+
| Reason            | Comments |
+-------------------+----------+
| Medication Refill |          |
+-------------------+----------+
 
 
 
 Encounter Details
 
 
+--------+--------+---------------------+----------------------+-------------------+
| Date   | Type   | Department          | Care Team            | Description       |
+--------+--------+---------------------+----------------------+-------------------+
| 10/01/ | Refill |   [facility]         |   Marzena Moser  | Medication Refill |
|    |        | NEPHROLOGY  [address]   | M, DO  [address]      |                   |
|        |        | [address]   | [address]      |                   |
|        |        | [place], [place]     | [place], [place]      |                   |
|        |        | [address]          | [address]  [phone]  |                   |
|        |        | [phone]        |  [phone] (Fax)  |                   |
+--------+--------+---------------------+----------------------+-------------------+
 
 
 
 Social History
 
 
+----------------+-------+-----------+--------+------+
| Tobacco Use    | Types | Packs/Day | Years  | Date |
|                |       |           | Used   |      |
+----------------+-------+-----------+--------+------+
| Never Assessed |       |           |        |      |
+----------------+-------+-----------+--------+------+
 
 
 
+------------------+---------------+
| Sex Assigned at  | Date Recorded |
| Birth            |               |
+------------------+---------------+
| Not on file      |               |
+------------------+---------------+
 
 
 
 
+----------------+-------------+-------------+
| Job Start Date | Occupation  | Industry    |
+----------------+-------------+-------------+
| Not on file    | Not on file | Not on file |
+----------------+-------------+-------------+
 
 
 
+----------------+--------------+------------+
| Travel History | Travel Start | Travel End |
+----------------+--------------+------------+
 
 
 
+-------------------------------------+
| No recent travel history available. |
+-------------------------------------+
 documented as of this encounter
 
 Plan of Treatment
 
 
+--------+-----------+------------+----------------------+-------------------+
| Date   | Type      | Specialty  | Care Team            | Description       |
+--------+-----------+------------+----------------------+-------------------+
| / | Office    | Cardiology |   Tonia Wilson,    |                   |
|    | Visit     |            | ARNJESSICA  [address]   |                   |
|        |           |            | St  [place], [place]  |                   |
|        |           |            | [address]  [phone]  |                   |
|        |           |            |  [phone] (Fax)  |                   |
+--------+-----------+------------+----------------------+-------------------+
| / | Hospital  | Radiology  |   Popeye Townsend, |                   |
|    | Encounter |            |  MD  [address] |                   |
|        |           |            |  St.  [place],   |                   |
|        |           |            | [place] [address]             |                   |
|        |           |            | [phone]         |                   |
|        |           |            | [phone] (Fax)   |                   |
+--------+-----------+------------+----------------------+-------------------+
| / | Surgery   | Radiology  |   Popeye Townsend, | CV EP PPM SYSTEM  |
2020   |           |            |  MD  [address] | IMPLANT           |
|        |           |            |  St.  [place],   |                   |
|        |           |            | [place] [address]             |                   |
|        |           |            | [phone]         |                   |
|        |           |            | [phone] (Fax)   |                   |
+--------+-----------+------------+----------------------+-------------------+
| 02/10/ | Clinical  | Cardiology |                      |                   |
|    | Support   |            |                      |                   |
+--------+-----------+------------+----------------------+-------------------+
| / | Office    | Cardiology |   Tonia Wilson,    |                   |
|    | Visit     |            | [place] [address]   |                   |
|        |           |            | [place]  |                   |
|        |           |            | [address]  [phone]  |                   |
|        |           |            |  [phone] (Fax)  |                   |
+--------+-----------+------------+----------------------+-------------------+
| / | Off-Site  | Nephrology |   Marzena Moser  |                   |
|    | Visit     |            | DO ETIENNE  [address] [place] [address]      |                   |
|        |           |            | [address]      |                   |
|        |           |            | [place]      |                   |
|        |           |            | [address]  [phone]  |                   |
|        |           |            |  [phone] (Fax)  |                   |
 
+--------+-----------+------------+----------------------+-------------------+
 documented as of this encounter
 
 Visit Diagnoses
 Not on filedocumented in this encounter

## 2020-01-08 NOTE — XMS
Encounter Summary
  Created on: 2020
 
 Viviana Ricci
 External Reference #: [id number]
 : 47
 Sex: Female
 
 Demographics
 
 
+-----------------------+----------------------+
| Address               | [address]      |
|                       | [place]  [address] |
+-----------------------+----------------------+
| Home Phone            | [phone]      |
+-----------------------+----------------------+
| Preferred Language    | Unknown              |
+-----------------------+----------------------+
| Marital Status        | Single               |
+-----------------------+----------------------+
| [employer] Affiliation | [id number]                 |
+-----------------------+----------------------+
| Race                  | Unknown              |
+-----------------------+----------------------+
| Ethnic Group          | Unknown              |
+-----------------------+----------------------+
 
 
 Author
 
 
+--------------+--------------------------------------------+
| Author       | [facility]  |
|              | and [place]                                |
+--------------+--------------------------------------------+
| Organization | [facility]  |
|              | and [place]                                |
+--------------+--------------------------------------------+
| Address      | Unknown                                    |
+--------------+--------------------------------------------+
| Phone        | Unavailable                                |
+--------------+--------------------------------------------+
 
 
 
 Support
 
 
+----------------+--------------+---------------------+-----------------+
| Name           | Relationship | Address             | Phone           |
+----------------+--------------+---------------------+-----------------+
| Ada/Ed Radhames | ECON         | [place]              | [phone] |
|                |              | [place]  |                 |
|                |              |  [address]              |                 |
+----------------+--------------+---------------------+-----------------+
 
 
 
 
 Care Team Providers
 
 
+-----------------------+------+-------------+
| Care Team Member Name | Role | Phone       |
+-----------------------+------+-------------+
 PCP  | Unavailable |
+-----------------------+------+-------------+
 
 
 
 Reason for Visit
 
 
+-------------------+----------+
| Reason            | Comments |
+-------------------+----------+
| Medication Refill |          |
+-------------------+----------+
 
 
 
 Encounter Details
 
 
+--------+--------+---------------------+----------------------+-------------------+
| Date   | Type   | Department          | Care Team            | Description       |
+--------+--------+---------------------+----------------------+-------------------+
| / | Refill |   [place]         |   Marzena Moser  | Medication Refill |
|    |        | NEPHROLOGY  [address]   | M, DO  [address]      |                   |
|        |        | [address]   | [address]      |                   |
|        |        | [place], [place]     | [place], [place]      |                   |
|        |        | [address]          | [address]  [phone]  |                   |
|        |        | [phone]        |  [phone] (Fax)  |                   |
+--------+--------+---------------------+----------------------+-------------------+
 
 
 
 Social History
 
 
+--------------+-------+-----------+--------+------+
| Tobacco Use  | Types | Packs/Day | Years  | Date |
|              |       |           | Used   |      |
+--------------+-------+-----------+--------+------+
| Never Smoker |       |           |        |      |
+--------------+-------+-----------+--------+------+
 
 
 
+---------------------+---+---+---+
| Smokeless Tobacco:  |   |   |   |
| Never Used          |   |   |   |
+---------------------+---+---+---+
 
 
 
+---------------------------------------------------------------+
| Comments: some second hand smoke exposure, but fairly minimal |
 
+---------------------------------------------------------------+
 
 
 
+-------------+-------------+---------+----------+
| Alcohol Use | Drinks/Week | oz/Week | Comments |
+-------------+-------------+---------+----------+
| No          |             |         |          |
+-------------+-------------+---------+----------+
 
 
 
+------------------+---------------+
| Sex Assigned at  | Date Recorded |
| Birth            |               |
+------------------+---------------+
| Not on file      |               |
+------------------+---------------+
 
 
 
+----------------+-------------+-------------+
| Job Start Date | Occupation  | Industry    |
+----------------+-------------+-------------+
| Not on file    | Not on file | Not on file |
+----------------+-------------+-------------+
 
 
 
+----------------+--------------+------------+
| Travel History | Travel Start | Travel End |
+----------------+--------------+------------+
 
 
 
+-------------------------------------+
| No recent travel history available. |
+-------------------------------------+
 documented as of this encounter
 
 Plan of Treatment
 
 
+--------+-----------+------------+----------------------+-------------------+
| Date   | Type      | Specialty  | Care Team            | Description       |
+--------+-----------+------------+----------------------+-------------------+
| / | Office    | Cardiology |   HellalfredoTonia,    |                   |
|    | Visit     |            | ARNP  [address]   |                   |
|        |           |            | St  [place], [place]  |                   |
|        |           |            | [address]  [phone]  |                   |
|        |           |            |  [phone] (Fax)  |                   |
+--------+-----------+------------+----------------------+-------------------+
| / | Hospital  | Radiology  |   Popeye Townsend, |                   |
|    | Encounter |            |  MD  [address] |                   |
|        |           |            |  St.  [place],   |                   |
|        |           |            | [place] [address]             |                   |
|        |           |            | [phone]         |                   |
|        |           |            | [phone] (Fax)   |                   |
+--------+-----------+------------+----------------------+-------------------+
| / | Surgery   | Radiology  |   Popeye Townsend, | CV EP PPM SYSTEM  |
 
|    |           |            |  MD  [address] | IMPLANT           |
|        |           |            |  St.  [place],   |                   |
|        |           |            | [place] [address]             |                   |
|        |           |            | [phone]         |                   |
|        |           |            | [phone] (Fax)   |                   |
+--------+-----------+------------+----------------------+-------------------+
| 02/10/ | Clinical  | Cardiology |                      |                   |
|    | Support   |            |                      |                   |
+--------+-----------+------------+----------------------+-------------------+
| / | Office    | Cardiology |   Tonia Wilson,    |                   |
|    | Visit     |            | ARNP  [address]   |                   |
|        |           |            | [place]  |                   |
|        |           |            | [address]  [phone]  |                   |
|        |           |            |  [phone] (Fax)  |                   |
+--------+-----------+------------+----------------------+-------------------+
| / | Off-Site  | Nephrology |   Marzena Moser  |                   |
|    | Visit     |            | DO ETIENNE  [address] [place] [address]      |                   |
|        |           |            | [address]      |                   |
|        |           |            | [place]      |                   |
|        |           |            | [address]  [phone]  |                   |
|        |           |            |  [phone] (Fax)  |                   |
+--------+-----------+------------+----------------------+-------------------+
 documented as of this encounter
 
 Visit Diagnoses
 
 
+-----------------------------------------------------------------------------------------+
| Diagnosis                                                                               |
+-----------------------------------------------------------------------------------------+
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
| stage IV, or unspecified - Primary                                                      |
+-----------------------------------------------------------------------------------------+
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
| membranous glomerulonephritis                                                           |
+-----------------------------------------------------------------------------------------+
|   Hyperlipidemia  Other and unspecified hyperlipidemia                                  |
+-----------------------------------------------------------------------------------------+
|   Complications of transplanted kidney                                                  |
+-----------------------------------------------------------------------------------------+
 documented in this encounter

## 2020-01-08 NOTE — XMS
Encounter Summary
  Created on: 2020
 
 Viviana Ricci
 External Reference #: [id number]
 : 47
 Sex: Female
 
 Demographics
 
 
+-----------------------+----------------------+
| Address               | [address]      |
|                       | [place]  [address] |
+-----------------------+----------------------+
| Home Phone            | [phone]      |
+-----------------------+----------------------+
| Preferred Language    | Unknown              |
+-----------------------+----------------------+
| Marital Status        | Single               |
+-----------------------+----------------------+
| Orthodox Affiliation | [id number]                 |
+-----------------------+----------------------+
| Race                  | Unknown              |
+-----------------------+----------------------+
| Ethnic Group          | Unknown              |
+-----------------------+----------------------+
 
 
 Author
 
 
+--------------+--------------------------------------------+
| Author       | [facility]  |
|              | and [facility]                                |
+--------------+--------------------------------------------+
| Organization | [facility]  |
|              | and [facility]                                |
+--------------+--------------------------------------------+
| Address      | Unknown                                    |
+--------------+--------------------------------------------+
| Phone        | Unavailable                                |
+--------------+--------------------------------------------+
 
 
 
 Support
 
 
+----------------+--------------+---------------------+-----------------+
| Name           | Relationship | Address             | Phone           |
+----------------+--------------+---------------------+-----------------+
| Ada/Ed Radhames | ECON         | [address]              | [phone] |
|                |              | [place]  |                 |
|                |              |  [address]              |                 |
+----------------+--------------+---------------------+-----------------+
 
 
 
 
 Care Team Providers
 
 
+------------------------+------+-----------------+
| Care Team Member Name  | Role | Phone           |
+------------------------+------+-----------------+
| Marzena Moser DO | PCP  | [phone] |
+------------------------+------+-----------------+
 
 
 
 Reason for Visit
 Evaluate & Treat (Routine)
 
+--------+--------+------------+--------------+--------------+---------------+
| Status | Reason | Specialty  | Diagnoses /  | Referred By  | Referred To   |
|        |        |            | Procedures   | Contact      | Contact       |
+--------+--------+------------+--------------+--------------+---------------+
| Closed |        | Nephrology |   Diagnoses  |   Stroemel,  |   Stroemel,   |
|        |        |            |  Unspecified | Marzena M,   | Marzena M, DO |
|        |        |            |              | DO  [address] |   [address]    |
|        |        |            | complication |  [address] [place] [address]   |
|        |        |            |  of kidney   |  [address]  [place]  | [address]  [place]    |
|        |        |            | transplant   | [place]    | [place]     |
|        |        |            | FSGS (focal  | [address]        | [address]  Phone: |
|        |        |            | segmental    | Phone:       |  [phone] |
|        |        |            | glomeruloscl | [phone] |   Fax:        |
|        |        |            | erosis)      |   Fax:       | [phone]  |
|        |        |            | Hypertension | [phone] |               |
|        |        |            | , essential  |              |               |
|        |        |            |  Procedures  |              |               |
|        |        |            |  MO OFFICE   |              |               |
|        |        |            | OUTPATIENT   |              |               |
|        |        |            | VISIT 25     |              |               |
|        |        |            | MINUTES      |              |               |
+--------+--------+------------+--------------+--------------+---------------+
 
 
 
 
 Encounter Details
 
 
+--------+-----------+---------------------+----------------------+----------------------+
| Date   | Type      | Department          | Care Team            | Description          |
+--------+-----------+---------------------+----------------------+----------------------+
| / | Off-Site  |   [facility]         |   Marzena Moser  | Kidney replaced by   |
| 2019   | Visit     | NEPHROLOGY  [address]   | M, DO  [address]      | transplant (Primary  |
|        |           | [address]   | [address]      | Dx); Type 2 DM with  |
|        |           | [place], [place]     | [place]      | CKD stage 2 and      |
|        |           | [address]          | [address]  [phone]  | hypertension (HCC);  |
|        |           | [phone]        |  [phone] (Fax)  | Essential            |
|        |           |                     |                      | hypertension,        |
|        |           |                     |                      | malignant; Mixed     |
|        |           |                     |                      | hyperlipidemia       |
+--------+-----------+---------------------+----------------------+----------------------+
 
 
 
 
 Social History
 
 
+--------------+-------+-----------+--------+------+
| Tobacco Use  | Types | Packs/Day | Years  | Date |
|              |       |           | Used   |      |
+--------------+-------+-----------+--------+------+
| Never Smoker |       |           |        |      |
+--------------+-------+-----------+--------+------+
 
 
 
+---------------------+---+---+---+
| Smokeless Tobacco:  |   |   |   |
| Never Used          |   |   |   |
+---------------------+---+---+---+
 
 
 
+---------------------------------------------------------------+
| Comments: some second hand smoke exposure, but fairly minimal |
+---------------------------------------------------------------+
 
 
 
+-------------+-------------+---------+----------+
| Alcohol Use | Drinks/Week | oz/Week | Comments |
+-------------+-------------+---------+----------+
| No          |             |         |          |
+-------------+-------------+---------+----------+
 
 
 
+------------------+---------------+
| Sex Assigned at  | Date Recorded |
| Birth            |               |
+------------------+---------------+
| Not on file      |               |
+------------------+---------------+
 
 
 
+----------------+-------------+-------------+
| Job Start Date | Occupation  | Industry    |
+----------------+-------------+-------------+
| Not on file    | Not on file | Not on file |
+----------------+-------------+-------------+
 
 
 
+----------------+--------------+------------+
| Travel History | Travel Start | Travel End |
+----------------+--------------+------------+
 
 
 
+-------------------------------------+
| No recent travel history available. |
+-------------------------------------+
 documented as of this encounter
 
 
 Last Filed Vital Signs
 
 
+-------------------+-------------------+----------------------+----------+
| Vital Sign        | Reading           | Time Taken           | Comments |
+-------------------+-------------------+----------------------+----------+
| Blood Pressure    | 120/52            | 2019  2:30 PM  |          |
|                   |                   | PDT                  |          |
+-------------------+-------------------+----------------------+----------+
| Pulse             | -                 | -                    |          |
+-------------------+-------------------+----------------------+----------+
| Temperature       | 36   C (96.8   F) | 2019  2:30 PM  |          |
|                   |                   | PDT                  |          |
+-------------------+-------------------+----------------------+----------+
| Respiratory Rate  | -                 | -                    |          |
+-------------------+-------------------+----------------------+----------+
| Oxygen Saturation | -                 | -                    |          |
+-------------------+-------------------+----------------------+----------+
| Inhaled Oxygen    | -                 | -                    |          |
| Concentration     |                   |                      |          |
+-------------------+-------------------+----------------------+----------+
| Weight            | -                 | -                    |          |
+-------------------+-------------------+----------------------+----------+
| Height            | -                 | -                    |          |
+-------------------+-------------------+----------------------+----------+
| Body Mass Index   | -                 | -                    |          |
+-------------------+-------------------+----------------------+----------+
 documented in this encounter
 
 Progress Notes
 Marzena Moser,  - 2019  2:00 PM PDTFormatting of this note might be different
 from the original.
 Subjective: NEPHROLOGY  
  
 Patient ID: Viviana Ricci is a 73 y.o. female.
 
 HPI Comments: 
 Followup for this very pleasant, 73 YOWF   who is s/p a renal allograft, 05, at [facility] w
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
ypothyroidism, hyperlipidemia, SHPTH,  previous low back pain, morbid obesity/JACK, chronic p
ulmonary  hypertension, historically related to centripetal obesity, and  CAD, s/p CABG x3 v
kayla,Kandi. 
 
 She states that she still drives but otherwise is still fairly sedentary.  She does not wal
k outside of her dwelling, e.g. house.for example, she sends her knees to the grocery store 
with money to purchase her groceries weekly rather than walk behind a grocery cart.  She sta
deb that walking causes too much neck and back pain but more likely not this is related to h
er large BMI.  Again, she refuses to weigh here in clinic.  Viviana is very pleasant but appear
s apathetic about lifestyle modification, especially weight loss over time.
 
 MEDS:
 
 Prograf 1.5 mg, AM and 1 mg, PM.
 Mycophenolate 250 mg, BID.
 Prednisone 5 mg, daily.
 Outpatient Prescriptions Marked as Taking for the 19 encounter (Off-Site Visit) with Maye Moser, DO 
 Medication Sig Dispense Refill 
   allopurinol (ZYLOPRIM) 100 mg tablet take 1 tablet by mouth once daily 30 tablet 11 
 
   aspirin 81 mg EC tablet Take 81 mg by mouth Daily.   
   B-D INS SYRINGE 0.5CC/31GX5/16 31G X 5/16" 0.5 ML MISC USE BEFORE MEALS AS DIRECTED 1 e
ach 11 
   cholecalciferol (VITAMIN D-3) 2000 UNITS TABS Take 5,000 Units by mouth Every other day
.   
   cinacalcet (SENSIPAR) 30 mg tablet take 1 tablet by mouth once daily 90 tablet 3 
   cyclobenzaprine (FLEXERIL) 10 mg tablet Take 1 tablet by mouth Twice  daily as needed f
or Muscle spasms. 45 tablet 0 
   fludrocortisone (FLORINEF) 0.1 mg tablet take 1 tablet by mouth every other day 90 tabl
et 4 
   furosemide (LASIX) 40 mg tablet Take 1 tablet by mouth Daily as needed. 30 tablet 11 
   glucose blood VI test strips (ONE TOUCH ULTRA TEST) strip Check blood sugar before each
 meal and as directed 100 each 12 
   insulin glargine (LANTUS) 100 units/mL injection (vial) inject 20 units subcutaneously 
every morning 10 vial 11 
   insulin lispro (HUMALOG) 100 units/mL injection (vial) inject subcutaneously BEFORE CHIKA
LS ACCORDING TO SLIDING SCALE Max dose 20 units daily 10 vial 11 
   levothyroxine (SYNTHROID) 50 mcg tablet take 1 tablet by mouth once daily 30 tablet 11 
   lisinopril (PRINIVIL,ZESTRIL) 30 MG tablet take 1 tablet by mouth once daily 90 tablet 
3 
   loperamide (ANTI-DIARRHEAL) 2 mg capsule Take 1 capsule by mouth 4 times daily as neede
d. 60 capsule 5 
   losartan (COZAAR) 50 mg tablet take 1 tablet by mouth once daily 90 tablet 3 
   magnesium oxide (MAG-OX) 400 mg tablet take 1 tablet by mouth twice a day 60 tablet 11 
   metoprolol tartrate (LOPRESSOR) 25 mg tablet take 1 tablet by mouth twice a day 60 tabl
et 11 
   omeprazole (PRILOSEC) 20 mg capsule Take 20 mg by mouth every morning (before breakfast
).   
   Prenatal Vit-Fe Fumarate-FA (PNV PRENATAL PLUS MULTIVITAMIN) 27-1 MG TABS take 1 tablet
 by mouth once daily. 30 tablet 11 
   QVAR REDIHALER 80 MCG/ACT inhaler    
   Respiratory Therapy Supplies MISC Continue nocturnal O2 at 2 l/m through CPAP for lifet
bart. Dx: JACK G47.33 Please provide new nasal mask for CPAP and any other needed replacement 
supplies. 1 each 0 
   Respiratory Therapy Supplies MISC Decrease CPAP to 12-18 cmH2O Diagnosis Code(s)327.23.
 Please send order to [facility]. 1 each 0 
   rosuvastatin (CRESTOR) 20 mg tablet take 1 tablet by mouth NIGHTLY 30 tablet 11 
   tacrolimus (PROGRAF) 1 mg capsule Take 1 capsule by mouth 2 times daily. With 0.5 mg ca
psule to equal 1.5 mg BID   
  
 
 No Known Allergies 
 
 Objective: /52  | Temp 36 C (96.8 F)   
 
 Physical Exam
 HEENT: No thrush.
 Heart:  Regular rate and rhythm with no S3, S4, murmur or rub.
 Lungs:  CTA bilaterally.  No rales or wheezes.
 Abdomen:  soft, obese,  nontender,  NABS.
 Extremities: no edema,  clubbing, or cyanosis. No foot ulcers.
 
 Lab Results 
 Component Value Date 
  NAEX 141 2019 
  KEX 4.5 2019 
  CLEX 105 2019 
  CO2EX 19 2019 
  BUNEX 44 (A) 2019 
  CREEX 1.42 (A) 2019 
 
  EGFREX 36 2019 
  GLUEX 129 (A) 2019 
  PHOSEX 2.9 2019 
  MGEX 1.6 (A) 2019 
  PTHEX 193.0 (A) 2016 
  CWH5ZEQ 7.2 2019 
 
 Lab Results 
 Component Value Date 
  CHOLEX 134 2019 
  HDLEX 54.8 2019 
  LDLEX 50 2019 
  TRIGEX 146 2019 
 
 Lab Results 
 Component Value Date 
  WBCEX 5.4 2019 
  HGBEX 13 2019 
  HCTEX 39 2019 
  PLTEX 160 2019 
 
 Lab Results 
 Component Value Date 
  TACROLIMUSEX 6.5 2019 
 
 Urine Pro/Cr ratio = 
 Lab Results 
 Component Value Date 
  PROTEX 0.250 (A) 2019 
 
 Assessment: 
 
 1.   Renal Allograft, 05 --  Her Scr is slightly increased.  Her tacro level is within
 goal but slightly higher than previous?
 2.   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
 3.   Type 2 DM, requiring insuline--good control.
 4.   Hyperlipidemia--stable on rosuvastatin.
 5.   Hypertension-- excellent control.
 6.   SHPTH-- stable.
 7.   Morbid Obesity/JACK/Pulmonary hypertension--clinically about the same.
 8.   CAD, s/p CABG  3 vessels, --stable.
 9.   Type IV RTA--compensated.
 10.  Chronic Low Back pain--in remission.
 11.   chronic DJD, left knee--  same. 
 
 Plan: 
 
 1.  I reviewed with Viviana her lab, Scr.  Her Scr does appear to be minimally creeping upward
.  Therefore, will have her decrease the tacrolimus to 1 mg BID.  She will recheck the level
 in 1 week.  If this becomes too low she may need a every 48 hour, alternating regimen?
 2.  Unfortunately, while Viviana is very pleasant, however, she still appears apathetic about 
lifestyle changes.  She does appear to take her immunosuppression religiously.
 3.  I did encourage her that her glycemic control appears excellent.
 4.  Will plan to see her back in  6  months at the CKD Clinic at [facility] [place].  Wi
ll review her next tacrolimus level when available.   She will continue to repeat her Standi
ng Order, drug level, HbA1c, lipid profile every 3 months.
 
 Electronically signed by Marzena Moser DO. 19 11:40 
 
 CC:  Jose David Dalal M.D., [facility], [facility]
 
            Andres Wilson ARNPElectronically signed by Marzena Moser DO at 2019
 12:09 PM PDTdocumented in this encounter
 
 Plan of Treatment
 
 
+--------+-----------+------------+----------------------+-------------------+
| Date   | Type      | Specialty  | Care Team            | Description       |
+--------+-----------+------------+----------------------+-------------------+
| / | Office    | Cardiology |   Tonia Wilson,    |                   |
|    | Visit     |            | ARNP  [address]   |                   |
|        |           |            | [place]  |                   |
|        |           |            | [address]  [phone]  |                   |
|        |           |            |  [phone] (Fax)  |                   |
+--------+-----------+------------+----------------------+-------------------+
| / | Hospital  | Radiology  |   Popeye Townsend, |                   |
|    | Encounter |            |  MD  [address] |                   |
|        |           |            |  [place],   |                   |
|        |           |            | [place] [address]             |                   |
|        |           |            | [phone]         |                   |
|        |           |            | [phone] (Fax)   |                   |
+--------+-----------+------------+----------------------+-------------------+
| / | Surgery   | Radiology  |   Popeye Townsend, | CV EP PPM SYSTEM  |
|    |           |            |  MD  [address] | IMPLANT           |
|        |           |            |  St.  [place],   |                   |
|        |           |            | [place] [address]             |                   |
|        |           |            | [phone]         |                   |
|        |           |            | [phone] (Fax)   |                   |
+--------+-----------+------------+----------------------+-------------------+
| 02/10/ | Clinical  | Cardiology |                      |                   |
|    | Support   |            |                      |                   |
+--------+-----------+------------+----------------------+-------------------+
| / | Office    | Cardiology |   Tonia Wilson,    |                   |
|    | Visit     |            | ARNJESSICA  [address]   |                   |
|        |           |            |   [place]  |                   |
|        |           |            | [address]  [phone]  |                   |
|        |           |            |  [phone] (Fax)  |                   |
+--------+-----------+------------+----------------------+-------------------+
| / | Off-Site  | Nephrology |   Marzena Moser  |                   |
|    | Visit     |            | DO ETIENNE  [address]      |                   |
|        |           |            | [address]      |                   |
|        |           |            | [place]      |                   |
|        |           |            | [address]  [phone]  |                   |
|        |           |            |  [phone] (Fax)  |                   |
+--------+-----------+------------+----------------------+-------------------+
 documented as of this encounter
 
 Procedures
 
 
+----------------+--------+------------+----------------------+----------------------+
| Procedure Name | Priori | Date/Time  | Associated Diagnosis | Comments             |
|                | ty     |            |                      |                      |
+----------------+--------+------------+----------------------+----------------------+
| EXTERNAL LAB:  | Routin | 2019 |                      |   Results for this   |
| HEMOGLOBIN A1C | e      |            |                      | procedure are in the |
|                |        |            |                      |  results section.    |
+----------------+--------+------------+----------------------+----------------------+
 documented in this encounter
 
 
 Results
 External Lab: Hemoglobin A1c (2019)
 
+-------------+-------+-----------+------------+--------------+
| Component   | Value | Ref Range | Performed  | Pathologist  |
|             |       |           | At         | Signature    |
+-------------+-------+-----------+------------+--------------+
| Hemoglobin  | 7.2   | %         |            |              |
| A1c,        |       |           |            |              |
| external    |       |           |            |              |
+-------------+-------+-----------+------------+--------------+
 
 
 
+----------+
| Specimen |
+----------+
| Blood    |
+----------+
 documented in this encounter
 
 Visit Diagnoses
 
 
+-----------------------------------------------------+
| Diagnosis                                           |
+-----------------------------------------------------+
|   Kidney replaced by transplant - Primary           |
+-----------------------------------------------------+
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
+-----------------------------------------------------+
|   Essential hypertension, malignant                 |
+-----------------------------------------------------+
|   Mixed hyperlipidemia                              |
+-----------------------------------------------------+
 documented in this encounter

## 2020-01-08 NOTE — XMS
Encounter Summary
  Created on: 2020
 
 Viviana Ricci
 External Reference #: [id number]
 : 47
 Sex: Female
 
 Demographics
 
 
+-----------------------+----------------------+
| Address               | [address]      |
|                       | [place]  [address] |
+-----------------------+----------------------+
| Home Phone            | [phone]      |
+-----------------------+----------------------+
| Preferred Language    | Unknown              |
+-----------------------+----------------------+
| Marital Status        | Single               |
+-----------------------+----------------------+
| Alevism Affiliation | [id number]                 |
+-----------------------+----------------------+
| Race                  | Unknown              |
+-----------------------+----------------------+
| Ethnic Group          | Unknown              |
+-----------------------+----------------------+
 
 
 Author
 
 
+--------------+--------------------------------------------+
| Author       | [facility]  |
|              | and [place]                                |
+--------------+--------------------------------------------+
| Organization | [facility]  |
|              | and [place]                                |
+--------------+--------------------------------------------+
| Address      | Unknown                                    |
+--------------+--------------------------------------------+
| Phone        | Unavailable                                |
+--------------+--------------------------------------------+
 
 
 
 Support
 
 
+----------------+--------------+---------------------+-----------------+
| Name           | Relationship | Address             | Phone           |
+----------------+--------------+---------------------+-----------------+
| Ada/Ed Radhames | ECON         | [place]              | [phone] |
|                |              | [place]  |                 |
|                |              |  [address]              |                 |
+----------------+--------------+---------------------+-----------------+
 
 
 
 
 Care Team Providers
 
 
+-----------------------+------+-------------+
| Care Team Member Name | Role | Phone       |
+-----------------------+------+-------------+
 PCP  | Unavailable |
+-----------------------+------+-------------+
 
 
 
 Reason for Visit
 
 
+-------------------+----------+
| Reason            | Comments |
+-------------------+----------+
| Medication Refill |          |
+-------------------+----------+
 
 
 
 Encounter Details
 
 
+--------+--------+---------------------+----------------------+-------------------+
| Date   | Type   | Department          | Care Team            | Description       |
+--------+--------+---------------------+----------------------+-------------------+
| / | Refill |   [facility]         |   Marzena Moser  | Medication Refill |
| 2018   |        | NEPHROLOGY  [address]   | M, DO  [address]      |                   |
|        |        | [address]   | [address]      |                   |
|        |        | [place], [place]     | [place], [place]      |                   |
|        |        | [address]          | [address]  [phone]  |                   |
|        |        | [phone]        |  [phone] (Fax)  |                   |
+--------+--------+---------------------+----------------------+-------------------+
 
 
 
 Social History
 
 
+--------------+-------+-----------+--------+------+
| Tobacco Use  | Types | Packs/Day | Years  | Date |
|              |       |           | Used   |      |
+--------------+-------+-----------+--------+------+
| Never Smoker |       |           |        |      |
+--------------+-------+-----------+--------+------+
 
 
 
+---------------------+---+---+---+
| Smokeless Tobacco:  |   |   |   |
| Never Used          |   |   |   |
+---------------------+---+---+---+
 
 
 
+---------------------------------------------------------------+
| Comments: some second hand smoke exposure, but fairly minimal |
 
+---------------------------------------------------------------+
 
 
 
+-------------+-------------+---------+----------+
| Alcohol Use | Drinks/Week | oz/Week | Comments |
+-------------+-------------+---------+----------+
| No          |             |         |          |
+-------------+-------------+---------+----------+
 
 
 
+------------------+---------------+
| Sex Assigned at  | Date Recorded |
| Birth            |               |
+------------------+---------------+
| Not on file      |               |
+------------------+---------------+
 
 
 
+----------------+-------------+-------------+
| Job Start Date | Occupation  | Industry    |
+----------------+-------------+-------------+
| Not on file    | Not on file | Not on file |
+----------------+-------------+-------------+
 
 
 
+----------------+--------------+------------+
| Travel History | Travel Start | Travel End |
+----------------+--------------+------------+
 
 
 
+-------------------------------------+
| No recent travel history available. |
+-------------------------------------+
 documented as of this encounter
 
 Plan of Treatment
 
 
+--------+-----------+------------+----------------------+-------------------+
| Date   | Type      | Specialty  | Care Team            | Description       |
+--------+-----------+------------+----------------------+-------------------+
| / | Office    | Cardiology |   HellalfredoTonia,    |                   |
|    | Visit     |            | ARNP  [address]   |                   |
|        |           |            | St  [place], [place]  |                   |
|        |           |            | [address]  [phone]  |                   |
|        |           |            |  [phone] (Fax)  |                   |
+--------+-----------+------------+----------------------+-------------------+
| / | Hospital  | Radiology  |   Popeye Townsend, |                   |
|    | Encounter |            |  MD  [address] |                   |
|        |           |            |  St.  [place],   |                   |
|        |           |            | [place] [address]             |                   |
|        |           |            | [phone]         |                   |
|        |           |            | [phone] (Fax)   |                   |
+--------+-----------+------------+----------------------+-------------------+
| / | Surgery   | Radiology  |   Popeye Townsend, | CV EP PPM SYSTEM  |
 
|    |           |            |  MD  [address] | IMPLANT           |
|        |           |            |  St.  [place],   |                   |
|        |           |            | [place] [address]             |                   |
|        |           |            | [phone]         |                   |
|        |           |            | [phone] (Fax)   |                   |
+--------+-----------+------------+----------------------+-------------------+
| 02/10/ | Clinical  | Cardiology |                      |                   |
|    | Support   |            |                      |                   |
+--------+-----------+------------+----------------------+-------------------+
| / | Office    | Cardiology |   Tonia Wilson,    |                   |
|    | Visit     |            | ARNP  [address]   |                   |
|        |           |            | [place]  |                   |
|        |           |            | [address]  [phone]  |                   |
|        |           |            |  [phone] (Fax)  |                   |
+--------+-----------+------------+----------------------+-------------------+
| / | Off-Site  | Nephrology |   Marzena Moser  |                   |
|    | Visit     |            | DO ETIENNE  [address] [place] [address]      |                   |
|        |           |            | [address]      |                   |
|        |           |            | [place]      |                   |
|        |           |            | [address]  [phone]  |                   |
|        |           |            |  [phone] (Fax)  |                   |
+--------+-----------+------------+----------------------+-------------------+
 documented as of this encounter
 
 Visit Diagnoses
 
 
+---------------------------------+
| Diagnosis                       |
+---------------------------------+
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
+---------------------------------+
 documented in this encounter

## 2020-01-08 NOTE — XMS
Encounter Summary
  Created on: 2020
 
 Viviana Ricci
 External Reference #: [id number]
 : 47
 Sex: Female
 
 Demographics
 
 
+-----------------------+----------------------+
| Address               | [address]      |
|                       | [place]  [address] |
+-----------------------+----------------------+
| Home Phone            | [phone]      |
+-----------------------+----------------------+
| Preferred Language    | Unknown              |
+-----------------------+----------------------+
| Marital Status        | Single               |
+-----------------------+----------------------+
| [employer] Affiliation | 1009                 |
+-----------------------+----------------------+
| Race                  | Unknown              |
+-----------------------+----------------------+
| Ethnic Group          | Unknown              |
+-----------------------+----------------------+
 
 
 Author
 
 
+--------------+--------------------------------------------+
| Author       | [facility]  |
|              | and [place]                                |
+--------------+--------------------------------------------+
| Organization | [facility]  |
|              | and [place]                                |
+--------------+--------------------------------------------+
| Address      | Unknown                                    |
+--------------+--------------------------------------------+
| Phone        | Unavailable                                |
+--------------+--------------------------------------------+
 
 
 
 Support
 
 
+----------------+--------------+---------------------+-----------------+
| Name           | Relationship | Address             | Phone           |
+----------------+--------------+---------------------+-----------------+
| Ada/Ed Radhames | ECON         | [address]              | [phone] |
|                |              | [place]  |                 |
|                |              |  [address]              |                 |
+----------------+--------------+---------------------+-----------------+
 
 
 
 
 Care Team Providers
 
 
+-----------------------+------+-------------+
| Care Team Member Name | Role | Phone       |
+-----------------------+------+-------------+
 PCP  | Unavailable |
+-----------------------+------+-------------+
 
 
 
 Encounter Details
 
 
+--------+-----------+----------------------+----------------------+-------------+
| Date   | Type      | Department           | Care Team            | Description |
+--------+-----------+----------------------+----------------------+-------------+
| / | [facility]  |   [facility] |   Marzena Moser  |             |
|    | Encounter |  MED CTR XRAY  [address] | M, DO  [address]      |             |
|        |           |  [place]       | [address]      |             |
|        |           | [place] [address] | [place]      |             |
|        |           |   [phone]       | [address] [phone]  |             |
|        |           |                      |  [phone] (Fax)  |             |
+--------+-----------+----------------------+----------------------+-------------+
 
 
 
 Social History
 
 
+----------------+-------+-----------+--------+------+
| Tobacco Use    | Types | Packs/Day | Years  | Date |
|                |       |           | Used   |      |
+----------------+-------+-----------+--------+------+
| Never Assessed |       |           |        |      |
+----------------+-------+-----------+--------+------+
 
 
 
+------------------+---------------+
| Sex Assigned at  | Date Recorded |
| Birth            |               |
+------------------+---------------+
| Not on file      |               |
+------------------+---------------+
 
 
 
+----------------+-------------+-------------+
| Job Start Date | Occupation  | Industry    |
+----------------+-------------+-------------+
| Not on file    | Not on file | Not on file |
+----------------+-------------+-------------+
 
 
 
+----------------+--------------+------------+
| Travel History | Travel Start | Travel End |
+----------------+--------------+------------+
 
 
 
 
+-------------------------------------+
| No recent travel history available. |
+-------------------------------------+
 documented as of this encounter
 
 Plan of Treatment
 
 
+--------+-----------+------------+----------------------+-------------------+
| Date   | Type      | Specialty  | Care Team            | Description       |
+--------+-----------+------------+----------------------+-------------------+
| / | Office    | Cardiology |   Tonia Wilson,    |                   |
|    | Visit     |            | ARNJESSICA  [address]   |                   |
|        |           |            | St  [place], [place]  |                   |
|        |           |            | [address]  [phone]  |                   |
|        |           |            |  [phone] (Fax)  |                   |
+--------+-----------+------------+----------------------+-------------------+
| / | Hospital  | Radiology  |   Popeye Townsend, |                   |
|    | Encounter |            |  MD  [address] |                   |
|        |           |            |  St. [place],   |                   |
|        |           |            | [place] [address]             |                   |
|        |           |            | [phone]         |                   |
|        |           |            | [phone] (Fax)   |                   |
+--------+-----------+------------+----------------------+-------------------+
| / | Surgery   | Radiology  |   Popeye Townsend, | CV EP PPM SYSTEM  |
|    |           |            |  MD  [address] | IMPLANT           |
|        |           |            |  [place],   |                   |
|        |           |            | [place] [address]             |                   |
|        |           |            | [phone]         |                   |
|        |           |            | [phone] (Fax)   |                   |
+--------+-----------+------------+----------------------+-------------------+
| 02/10/ | Clinical  | Cardiology |                      |                   |
|    | Support   |            |                      |                   |
+--------+-----------+------------+----------------------+-------------------+
| / | Office    | Cardiology |   Tonia Wilson,    |                   |
|    | Visit     |            | BELKIS  [address]   |                   |
|        |           |            | [place]  |                   |
|        |           |            | [address]  [phone]  |                   |
|        |           |            |  [phone] (Fax)  |                   |
+--------+-----------+------------+----------------------+-------------------+
| / | Off-Site  | Nephrology |   Marzena Moser  |                   |
|    | Visit     |            | DO ETIENNE  [address] [place] [address]      |                   |
|        |           |            | [address]      |                   |
|        |           |            | [place]      |                   |
|        |           |            | [address] [phone]  |                   |
|        |           |            |  [phone] (Fax)  |                   |
+--------+-----------+------------+----------------------+-------------------+
 documented as of this encounter
 
 Visit Diagnoses
 Not on filedocumented in this encounter

## 2020-01-08 NOTE — XMS
Encounter Summary
  Created on: 2020
 
 Viviana Ricci
 External Reference #: [id number]
 : 47
 Sex: Female
 
 Demographics
 
 
+-----------------------+----------------------+
| Address               | [address]      |
|                       | [place]  [address] |
+-----------------------+----------------------+
| Home Phone            | [phone]      |
+-----------------------+----------------------+
| Preferred Language    | Unknown              |
+-----------------------+----------------------+
| Marital Status        | Single               |
+-----------------------+----------------------+
| [employer] Affiliation | 1009                 |
+-----------------------+----------------------+
| Race                  | Unknown              |
+-----------------------+----------------------+
| Ethnic Group          | Unknown              |
+-----------------------+----------------------+
 
 
 Author
 
 
+--------------+--------------------------------------------+
| Author       | [facility]  |
|              | and [place]                                |
+--------------+--------------------------------------------+
| Organization | [facility]  |
|              | and [place]                                |
+--------------+--------------------------------------------+
| Address      | Unknown                                    |
+--------------+--------------------------------------------+
| Phone        | Unavailable                                |
+--------------+--------------------------------------------+
 
 
 
 Support
 
 
+----------------+--------------+---------------------+-----------------+
| Name           | Relationship | Address             | Phone           |
+----------------+--------------+---------------------+-----------------+
| Ada/Ed Radhames | ECON         | [address]              | [phone] |
|                |              | [place]  |                 |
|                |              |  [address]              |                 |
+----------------+--------------+---------------------+-----------------+
 
 
 
 
 Care Team Providers
 
 
+-----------------------+------+-------------+
| Care Team Member Name | Role | Phone       |
+-----------------------+------+-------------+
 PCP  | Unavailable |
+-----------------------+------+-------------+
 
 
 
 Encounter Details
 
 
+--------+-----------+----------------------+----------------------+-------------+
| Date   | Type      | Department           | Care Team            | Description |
+--------+-----------+----------------------+----------------------+-------------+
| 03/10/ | [facility]  |   [facility] |   Edgar Sanders,   |             |
|    | Encounter |  MED CTR LABORATORY  | MD Ervin TURNER      |             |
|        |           |  [address]  [place] | [place]      |             |
|        |           |  [place]           | [address]  [phone]  |             |
|        |           | [address]           |  [phone] (Fax)  |             |
|        |           | [phone]         |                      |             |
+--------+-----------+----------------------+----------------------+-------------+
 
 
 
 Social History
 
 
+----------------+-------+-----------+--------+------+
| Tobacco Use    | Types | Packs/Day | Years  | Date |
|                |       |           | Used   |      |
+----------------+-------+-----------+--------+------+
| Never Assessed |       |           |        |      |
+----------------+-------+-----------+--------+------+
 
 
 
+------------------+---------------+
| Sex Assigned at  | Date Recorded |
| Birth            |               |
+------------------+---------------+
| Not on file      |               |
+------------------+---------------+
 
 
 
+----------------+-------------+-------------+
| Job Start Date | Occupation  | Industry    |
+----------------+-------------+-------------+
| Not on file    | Not on file | Not on file |
+----------------+-------------+-------------+
 
 
 
+----------------+--------------+------------+
| Travel History | Travel Start | Travel End |
+----------------+--------------+------------+
 
 
 
 
+-------------------------------------+
| No recent travel history available. |
+-------------------------------------+
 documented as of this encounter
 
 Plan of Treatment
 
 
+--------+-----------+------------+----------------------+-------------------+
| Date   | Type      | Specialty  | Care Team            | Description       |
+--------+-----------+------------+----------------------+-------------------+
| / | Office    | Cardiology |   Tonia Wilson,    |                   |
|    | Visit     |            | [address]   |                   |
|        |           |            | St  [place], [place]  |                   |
|        |           |            | [address]  [phone]  |                   |
|        |           |            |  [phone] (Fax)  |                   |
+--------+-----------+------------+----------------------+-------------------+
| / | Hospital  | Radiology  |   Popeye Townsend, |                   |
|    | Encounter |            |  MD  [address] |                   |
|        |           |            |  [place],   |                   |
|        |           |            | [place] [address]             |                   |
|        |           |            | [phone]         |                   |
|        |           |            | [phone] (Fax)   |                   |
+--------+-----------+------------+----------------------+-------------------+
| / | Surgery   | Radiology  |   Popeye Townsend, | CV EP PPM SYSTEM  |
|    |           |            |  MD  [address] | IMPLANT           |
|        |           |            |  St.  [place],   |                   |
|        |           |            | [place] [address]             |                   |
|        |           |            | [phone]         |                   |
|        |           |            | [phone] (Fax)   |                   |
+--------+-----------+------------+----------------------+-------------------+
| 02/10/ | Clinical  | Cardiology |                      |                   |
|    | Support   |            |                      |                   |
+--------+-----------+------------+----------------------+-------------------+
| / | Office    | Cardiology |   Tonia Wilson,    |                   |
|    | Visit     |            | BELKIS  [address]   |                   |
|        |           |            | [place]  |                   |
|        |           |            | [address]  [phone]  |                   |
|        |           |            |  [phone] (Fax)  |                   |
+--------+-----------+------------+----------------------+-------------------+
| / | Off-Site  | Nephrology |   Marzena Moser  |                   |
|    | Visit     |            | DO ETIENNE  [address] [place] [address]      |                   |
|        |           |            | [address]      |                   |
|        |           |            | [place]      |                   |
|        |           |            | [address] [phone]  |                   |
|        |           |            |  [phone] (Fax)  |                   |
+--------+-----------+------------+----------------------+-------------------+
 documented as of this encounter
 
 Visit Diagnoses
 Not on filedocumented in this encounter

## 2020-01-08 NOTE — XMS
Encounter Summary
  Created on: 2020
 
 Viviana Ricci
 External Reference #: [id number]
 : 47
 Sex: Female
 
 Demographics
 
 
+-----------------------+----------------------+
| Address               | [address]      |
|                       | [place]  [address] |
+-----------------------+----------------------+
| Home Phone            | [phone]      |
+-----------------------+----------------------+
| Preferred Language    | Unknown              |
+-----------------------+----------------------+
| Marital Status        | Single               |
+-----------------------+----------------------+
| Bahai Affiliation | [id number]                 |
+-----------------------+----------------------+
| Race                  | Unknown              |
+-----------------------+----------------------+
| Ethnic Group          | Unknown              |
+-----------------------+----------------------+
 
 
 Author
 
 
+--------------+--------------------------------------------+
| Author       | [facility]  |
|              | and [facility]                                |
+--------------+--------------------------------------------+
| Organization | [facility]  |
|              | and [facility]                                |
+--------------+--------------------------------------------+
| Address      | Unknown                                    |
+--------------+--------------------------------------------+
| Phone        | Unavailable                                |
+--------------+--------------------------------------------+
 
 
 
 Support
 
 
+----------------+--------------+---------------------+-----------------+
| Name           | Relationship | Address             | Phone           |
+----------------+--------------+---------------------+-----------------+
| Ada/Ed Radhames | ECON         | [place]              | [phone] |
|                |              | [place]  |                 |
|                |              |  [id number]              |                 |
+----------------+--------------+---------------------+-----------------+
 
 
 
 
 Care Team Providers
 
 
+-----------------------+------+-------------+
| Care Team Member Name | Role | Phone       |
+-----------------------+------+-------------+
 PCP  | Unavailable |
+-----------------------+------+-------------+
 
 
 
 Reason for Visit
 
 
+-------------------+----------+
| Reason            | Comments |
+-------------------+----------+
| Medication Refill |          |
+-------------------+----------+
 
 
 
 Encounter Details
 
 
+--------+--------+---------------------+----------------------+-------------------+
| Date   | Type   | Department          | Care Team            | Description       |
+--------+--------+---------------------+----------------------+-------------------+
| / | Refill |   [facility]         |   Marzena Moser  | Medication Refill |
| 2016   |        | NEPHROLOGY  [address]   | M, DO  [address]      |                   |
|        |        | [address]   | [address]      |                   |
|        |        | [place], [place]     | [place], [place]      |                   |
|        |        | [address]          | [address]  [phone]  |                   |
|        |        | [phone]        |  [phone] (Fax)  |                   |
+--------+--------+---------------------+----------------------+-------------------+
 
 
 
 Social History
 
 
+--------------+-------+-----------+--------+------+
| Tobacco Use  | Types | Packs/Day | Years  | Date |
|              |       |           | Used   |      |
+--------------+-------+-----------+--------+------+
| Never Smoker |       |           |        |      |
+--------------+-------+-----------+--------+------+
 
 
 
+---------------------+---+---+---+
| Smokeless Tobacco:  |   |   |   |
| Never Used          |   |   |   |
+---------------------+---+---+---+
 
 
 
+---------------------------------------------------------------+
| Comments: some second hand smoke exposure, but fairly minimal |
 
+---------------------------------------------------------------+
 
 
 
+-------------+-------------+---------+----------+
| Alcohol Use | Drinks/Week | oz/Week | Comments |
+-------------+-------------+---------+----------+
| No          |             |         |          |
+-------------+-------------+---------+----------+
 
 
 
+------------------+---------------+
| Sex Assigned at  | Date Recorded |
| Birth            |               |
+------------------+---------------+
| Not on file      |               |
+------------------+---------------+
 
 
 
+----------------+-------------+-------------+
| Job Start Date | Occupation  | Industry    |
+----------------+-------------+-------------+
| Not on file    | Not on file | Not on file |
+----------------+-------------+-------------+
 
 
 
+----------------+--------------+------------+
| Travel History | Travel Start | Travel End |
+----------------+--------------+------------+
 
 
 
+-------------------------------------+
| No recent travel history available. |
+-------------------------------------+
 documented as of this encounter
 
 Plan of Treatment
 
 
+--------+-----------+------------+----------------------+-------------------+
| Date   | Type      | Specialty  | Care Team            | Description       |
+--------+-----------+------------+----------------------+-------------------+
| / | Office    | Cardiology |   HellalfredoTonia,    |                   |
|    | Visit     |            | ARNP  [address]   |                   |
|        |           |            | St  [place], [place]  |                   |
|        |           |            | [address]  [phone]  |                   |
|        |           |            |  [phone] (Fax)  |                   |
+--------+-----------+------------+----------------------+-------------------+
| / | Hospital  | Radiology  |   Popeye Townsend, |                   |
|    | Encounter |            |  MD  [address] |                   |
|        |           |            |  St.  [place],   |                   |
|        |           |            | [place] [address]             |                   |
|        |           |            | [phone]         |                   |
|        |           |            | [phone] (Fax)   |                   |
+--------+-----------+------------+----------------------+-------------------+
| / | Surgery   | Radiology  |   Popeye Townsend, | CV EP PPM SYSTEM  |
 
|    |           |            |  MD  [address] | IMPLANT           |
|        |           |            |  St.  [place],   |                   |
|        |           |            | [place] [address]             |                   |
|        |           |            | [phone]         |                   |
|        |           |            | [phone] (Fax)   |                   |
+--------+-----------+------------+----------------------+-------------------+
| 02/10/ | Clinical  | Cardiology |                      |                   |
|    | Support   |            |                      |                   |
+--------+-----------+------------+----------------------+-------------------+
| / | Office    | Cardiology |   Toina Wilson,    |                   |
|    | Visit     |            | ARNP  [address]   |                   |
|        |           |            | [place]  |                   |
|        |           |            | [address]  [phone]  |                   |
|        |           |            |  [phone] (Fax)  |                   |
+--------+-----------+------------+----------------------+-------------------+
| / | Off-Site  | Nephrology |   Marzena Moser  |                   |
|    | Visit     |            | DO ETIENNE  [address] [place] [address]      |                   |
|        |           |            | [address]      |                   |
|        |           |            | [place]      |                   |
|        |           |            | [address]  [phone]  |                   |
|        |           |            |  [phone] (Fax)  |                   |
+--------+-----------+------------+----------------------+-------------------+
 documented as of this encounter
 
 Visit Diagnoses
 
 
+----------------------------------------------------------------------------------------+
| Diagnosis                                                                              |
+----------------------------------------------------------------------------------------+
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
| left (HCC) - Primary                                                                   |
+----------------------------------------------------------------------------------------+
 documented in this encounter

## 2020-01-08 NOTE — XMS
Encounter Summary
  Created on: 2020
 
 Viviana Ricci
 External Reference #: [id number]
 : 47
 Sex: Female
 
 Demographics
 
 
+-----------------------+----------------------+
| Address               | [address]      |
|                       | [place]  [address] |
+-----------------------+----------------------+
| Home Phone            | [phone]      |
+-----------------------+----------------------+
| Preferred Language    | Unknown              |
+-----------------------+----------------------+
| Marital Status        | Single               |
+-----------------------+----------------------+
| Sabianist Affiliation | [id number]                 |
+-----------------------+----------------------+
| Race                  | Unknown              |
+-----------------------+----------------------+
| Ethnic Group          | Unknown              |
+-----------------------+----------------------+
 
 
 Author
 
 
+--------------+--------------------------------------------+
| Author       | [facility]  |
|              | and [facility]                                |
+--------------+--------------------------------------------+
| Organization | [facility]  |
|              | and [facility]                                |
+--------------+--------------------------------------------+
| Address      | Unknown                                    |
+--------------+--------------------------------------------+
| Phone        | Unavailable                                |
+--------------+--------------------------------------------+
 
 
 
 Support
 
 
+----------------+--------------+---------------------+-----------------+
| Name           | Relationship | Address             | Phone           |
+----------------+--------------+---------------------+-----------------+
| Ada/Ed Radhames | ECON         | [address]              | [phone] |
|                |              | [place]  |                 |
|                |              |  [address]              |                 |
+----------------+--------------+---------------------+-----------------+
 
 
 
 
 Care Team Providers
 
 
+------------------------+------+-----------------+
| Care Team Member Name  | Role | Phone           |
+------------------------+------+-----------------+
| Marzena Moser DO | PCP  | [phone] |
+------------------------+------+-----------------+
 
 
 
 Encounter Details
 
 
+--------+----------+---------------------+----------------------+-------------+
| Date   | Type     | Department          | Care Team            | Description |
+--------+----------+---------------------+----------------------+-------------+
| / | Abstract |   [facility] [place]         |   Marzena Moser  |             |
| 2019   |          | NEPHROLOGY  [address]   | [address]      |             |
|        |          | [address]   | [address]      |             |
|        |          | [place], [place]     | [place], [place]      |             |
|        |          | [address]          | [address]  [phone]  |             |
|        |          | [phone]        |  [phone] (Fax)  |             |
+--------+----------+---------------------+----------------------+-------------+
 
 
 
 Social History
 
 
+--------------+-------+-----------+--------+------+
| Tobacco Use  | Types | Packs/Day | Years  | Date |
|              |       |           | Used   |      |
+--------------+-------+-----------+--------+------+
| Never Smoker |       |           |        |      |
+--------------+-------+-----------+--------+------+
 
 
 
+---------------------+---+---+---+
| Smokeless Tobacco:  |   |   |   |
| Never Used          |   |   |   |
+---------------------+---+---+---+
 
 
 
+---------------------------------------------------------------+
| Comments: some second hand smoke exposure, but fairly minimal |
+---------------------------------------------------------------+
 
 
 
+-------------+-------------+---------+----------+
| Alcohol Use | Drinks/Week | oz/Week | Comments |
+-------------+-------------+---------+----------+
| No          |             |         |          |
+-------------+-------------+---------+----------+
 
 
 
 
+------------------+---------------+
| Sex Assigned at  | Date Recorded |
| Birth            |               |
+------------------+---------------+
| Not on file      |               |
+------------------+---------------+
 
 
 
+----------------+-------------+-------------+
| Job Start Date | Occupation  | Industry    |
+----------------+-------------+-------------+
| Not on file    | Not on file | Not on file |
+----------------+-------------+-------------+
 
 
 
+----------------+--------------+------------+
| Travel History | Travel Start | Travel End |
+----------------+--------------+------------+
 
 
 
+-------------------------------------+
| No recent travel history available. |
+-------------------------------------+
 documented as of this encounter
 
 Plan of Treatment
 
 
+--------+-----------+------------+----------------------+-------------------+
| Date   | Type      | Specialty  | Care Team            | Description       |
+--------+-----------+------------+----------------------+-------------------+
| / | Office    | Cardiology |   Tonia Wilson,    |                   |
|    | Visit     |            | ARNP  [address]   |                   |
|        |           |            | St  [place] [facility] [place]  |                   |
|        |           |            | [address]  [phone]  |                   |
|        |           |            |  [phone] (Fax)  |                   |
+--------+-----------+------------+----------------------+-------------------+
| / | Hospital  | Radiology  |   Popeye Townsend, |                   |
|    | Encounter |            |  MD  [address] |                   |
|        |           |            |  St.  [place],   |                   |
|        |           |            | [place] [address]             |                   |
|        |           |            | [phone]         |                   |
|        |           |            | [phone] (Fax)   |                   |
+--------+-----------+------------+----------------------+-------------------+
| / | Surgery   | Radiology  |   Popeye Townsend, | CV EP PPM SYSTEM  |
|    |           |            |  MD  [address] | IMPLANT           |
|        |           |            |  St.  [place],   |                   |
|        |           |            | [place] [address]             |                   |
|        |           |            | [phone]         |                   |
|        |           |            | [phone] (Fax)   |                   |
+--------+-----------+------------+----------------------+-------------------+
| 02/10/ | Clinical  | Cardiology |                      |                   |
2020   | Support   |            |                      |                   |
+--------+-----------+------------+----------------------+-------------------+
| / | Office    | Cardiology |   Tonia Wilson,    |                   |
|    | Visit     |            | [facility]  [address]   |                   |
 
|        |           |            |   [place]  |                   |
|        |           |            | [address]  [phone]  |                   |
|        |           |            |  [phone] (Fax)  |                   |
+--------+-----------+------------+----------------------+-------------------+
| / | Off-Site  | Nephrology |   Marzena Moser  |                   |
2020   | Visit     |            | DO [facility]TIENNE  [address]      |                   |
|        |           |            | [address]      |                   |
|        |           |            | [place]      |                   |
|        |           |            | [address]  [phone]  |                   |
|        |           |            |  [phone] (Fax)  |                   |
+--------+-----------+------------+----------------------+-------------------+
 documented as of this encounter
 
 Procedures
 
 
+----------------------+--------+------------+----------------------+----------------------+
| Procedure Name       | Priori | Date/Time  | Associated Diagnosis | Comments             |
|                      | ty     |            |                      |                      |
+----------------------+--------+------------+----------------------+----------------------+
| EXTERNAL LAB: BUN    | Routin | 2019 |                      |   Results for this   |
|                      | e      |            |                      | procedure are in the |
|                      |        |            |                      |  results section.    |
+----------------------+--------+------------+----------------------+----------------------+
| EXTERNAL LAB:        | Routin | 2019 |                      |   Results for this   |
| GLUCOSE              | e      |            |                      | procedure are in the |
|                      |        |            |                      |  results section.    |
+----------------------+--------+------------+----------------------+----------------------+
| EXTERNAL LAB:        | Routin | 2019 |                      |   Results for this   |
| TACROLIMUS LEVEL,    | e      |            |                      | procedure are in the |
| LC-MS/MS             |        |            |                      |  results section.    |
+----------------------+--------+------------+----------------------+----------------------+
| EXTERNAL LAB:        | Routin | 2019 |                      |   Results for this   |
| CYTOMEGALOVIRUS AB,  | e      |            |                      | procedure are in the |
| IGM                  |        |            |                      |  results section.    |
+----------------------+--------+------------+----------------------+----------------------+
| EXTERNAL LAB: ALT    | Routin | 2019 |                      |   Results for this   |
|                      | e      |            |                      | procedure are in the |
|                      |        |            |                      |  results section.    |
+----------------------+--------+------------+----------------------+----------------------+
| EXTERNAL LAB: AST    | Routin | 2019 |                      |   Results for this   |
|                      | e      |            |                      | procedure are in the |
|                      |        |            |                      |  results section.    |
+----------------------+--------+------------+----------------------+----------------------+
| EXTERNAL LAB:        | Routin | 2019 |                      |   Results for this   |
| ALKALINE PHOSPHATASE | e      |            |                      | procedure are in the |
|                      |        |            |                      |  results section.    |
+----------------------+--------+------------+----------------------+----------------------+
| EXTERNAL LAB:        | Routin | 2019 |                      |   Results for this   |
| BILIRUBIN, TOTAL     | e      |            |                      | procedure are in the |
|                      |        |            |                      |  results section.    |
+----------------------+--------+------------+----------------------+----------------------+
| EXTERNAL LAB:        | Routin | 2019 |                      |   Results for this   |
| ALBUMIN              | e      |            |                      | procedure are in the |
|                      |        |            |                      |  results section.    |
+----------------------+--------+------------+----------------------+----------------------+
| EXTERNAL LAB:        | Routin | 2019 |                      |   Results for this   |
| PROTEIN, TOTAL       | e      |            |                      | procedure are in the |
|                      |        |            |                      |  results section.    |
+----------------------+--------+------------+----------------------+----------------------+
 
| EXTERNAL LAB:        | Routin | 2019 |                      |   Results for this   |
| PHOSPHORUS           | e      |            |                      | procedure are in the |
|                      |        |            |                      |  results section.    |
+----------------------+--------+------------+----------------------+----------------------+
| EXTERNAL LAB:        | Routin | 2019 |                      |   Results for this   |
| MAGNESIUM            | e      |            |                      | procedure are in the |
|                      |        |            |                      |  results section.    |
+----------------------+--------+------------+----------------------+----------------------+
| EXTERNAL LAB:        | Routin | 2019 |                      |   Results for this   |
| CALCIUM              | e      |            |                      | procedure are in the |
|                      |        |            |                      |  results section.    |
+----------------------+--------+------------+----------------------+----------------------+
| EXTERNAL LAB: CARBON | Routin | 2019 |                      |   Results for this   |
|  DIOXIDE             | e      |            |                      | procedure are in the |
|                      |        |            |                      |  results section.    |
+----------------------+--------+------------+----------------------+----------------------+
| EXTERNAL LAB:        | Routin | 2019 |                      |   Results for this   |
| CHLORIDE             | e      |            |                      | procedure are in the |
|                      |        |            |                      |  results section.    |
+----------------------+--------+------------+----------------------+----------------------+
| EXTERNAL LAB:        | Routin | 2019 |                      |   Results for this   |
| POTASSIUM            | e      |            |                      | procedure are in the |
|                      |        |            |                      |  results section.    |
+----------------------+--------+------------+----------------------+----------------------+
| EXTERNAL LAB: SODIUM | Routin | 2019 |                      |   Results for this   |
|                      | e      |            |                      | procedure are in the |
|                      |        |            |                      |  results section.    |
+----------------------+--------+------------+----------------------+----------------------+
| EXTERNAL LAB:        | Routin | 2019 |                      |   Results for this   |
| PROTEIN/CREATININE   | e      |            |                      | procedure are in the |
| RATIO                |        |            |                      |  results section.    |
+----------------------+--------+------------+----------------------+----------------------+
| EXTERNAL LAB: CBC    | Routin | 2019 |                      |   Results for this   |
|                      | e      |            |                      | procedure are in the |
|                      |        |            |                      |  results section.    |
+----------------------+--------+------------+----------------------+----------------------+
| EXTERNAL LAB:        | Routin | 2019 |                      |   Results for this   |
| TRIGLYCERIDES        | e      |            |                      | procedure are in the |
|                      |        |            |                      |  results section.    |
+----------------------+--------+------------+----------------------+----------------------+
| EXTERNAL LAB:        | Routin | 2019 |                      |   Results for this   |
| CHOLESTEROL, HDL     | e      |            |                      | procedure are in the |
|                      |        |            |                      |  results section.    |
+----------------------+--------+------------+----------------------+----------------------+
| EXTERNAL LAB:        | Routin | 2019 |                      |   Results for this   |
| CHOLESTEROL, TOTAL   | e      |            |                      | procedure are in the |
|                      |        |            |                      |  results section.    |
+----------------------+--------+------------+----------------------+----------------------+
| EXTERNAL LAB:        | Routin | 2019 |                      |   Results for this   |
| CHOLESTEROL, LDL     | e      |            |                      | procedure are in the |
|                      |        |            |                      |  results section.    |
+----------------------+--------+------------+----------------------+----------------------+
| EXTERNAL LAB: EGFR   | Routin | 2019 |                      |   Results for this   |
|                      | e      |            |                      | procedure are in the |
|                      |        |            |                      |  results section.    |
+----------------------+--------+------------+----------------------+----------------------+
| EXTERNAL LAB:        | Routin | 2019 |                      |   Results for this   |
| CREATININE           | e      |            |                      | procedure are in the |
|                      |        |            |                      |  results section.    |
+----------------------+--------+------------+----------------------+----------------------+
 
| HEMOGLOBIN A1C       | Routin | 2019 |                      |   Results for this   |
|                      | e      |            |                      | procedure are in the |
|                      |        |            |                      |  results section.    |
+----------------------+--------+------------+----------------------+----------------------+
 documented in this encounter
 
 Results
 External Lab: Tacrolimus Level, LC-MS/MS (2019)
 
+-------------+-------+-----------+------------+--------------+
| Component   | Value | Ref Range | Performed  | Pathologist  |
|             |       |           | At         | Signature    |
+-------------+-------+-----------+------------+--------------+
| Tacrolimus, | 5.6   |           |            |              |
|  LC-MS/MS,  |       |           |            |              |
| External    |       |           |            |              |
+-------------+-------+-----------+------------+--------------+
 
 
 
+----------+
| Specimen |
+----------+
|          |
+----------+
 External Lab: Triglycerides (2019)
 
+-------------+-------+-----------+------------+--------------+
| Component   | Value | Ref Range | Performed  | Pathologist  |
|             |       |           | At         | Signature    |
+-------------+-------+-----------+------------+--------------+
| Triglycerid | 146   |           |            |              |
| es,         |       |           |            |              |
| External    |       |           |            |              |
+-------------+-------+-----------+------------+--------------+
 
 
 
+----------+
| Specimen |
+----------+
| Blood    |
+----------+
 External Lab: Cholesterol, HDL (2019)
 
+-------------+-------+-----------+------------+--------------+
| Component   | Value | Ref Range | Performed  | Pathologist  |
|             |       |           | At         | Signature    |
+-------------+-------+-----------+------------+--------------+
| HDL         | 54.8  | mg/dl     |            |              |
| Cholesterol |       |           |            |              |
| , External  |       |           |            |              |
+-------------+-------+-----------+------------+--------------+
 
 
 
+----------+
| Specimen |
+----------+
| Blood    |
 
+----------+
 External Lab: Cholesterol, Total (2019)
 
+-------------+-------+-----------+------------+--------------+
| Component   | Value | Ref Range | Performed  | Pathologist  |
|             |       |           | At         | Signature    |
+-------------+-------+-----------+------------+--------------+
| Cholesterol | 134   | mg/dl     |            |              |
| , Total,    |       |           |            |              |
| External    |       |           |            |              |
+-------------+-------+-----------+------------+--------------+
 
 
 
+----------+
| Specimen |
+----------+
| Blood    |
+----------+
 External Lab: Cholesterol, LDL (2019)
 
+-------------+-------+-----------+------------+--------------+
| Component   | Value | Ref Range | Performed  | Pathologist  |
|             |       |           | At         | Signature    |
+-------------+-------+-----------+------------+--------------+
| LDL         | 50    |           |            |              |
| Cholesterol |       |           |            |              |
| , Direct,   |       |           |            |              |
| External    |       |           |            |              |
+-------------+-------+-----------+------------+--------------+
 
 
 
+----------+
| Specimen |
+----------+
| Blood    |
+----------+
 External Lab: Cytomegalovirus Ab, IgM (2019)
 
+-----------+----------+-----------+------------+--------------+
| Component | Value    | Ref Range | Performed  | Pathologist  |
|           |          |           | At         | Signature    |
+-----------+----------+-----------+------------+--------------+
| CMV IgM,  | positive |           |            |              |
| External  |          |           |            |              |
+-----------+----------+-----------+------------+--------------+
 
 
 
+----------+
| Specimen |
+----------+
|          |
+----------+
 External Lab: Protein/Creatinine Ratio (2019)
 
+-------------+-----------+-----------+------------+--------------+
| Component   | Value     | Ref Range | Performed  | Pathologist  |
|             |           |           | At         | Signature    |
 
+-------------+-----------+-----------+------------+--------------+
| Protein/Cre | 0.250 (A) | 0.2       |            |              |
| atinine     |           |           |            |              |
| Ratio,      |           |           |            |              |
| External    |           |           |            |              |
+-------------+-----------+-----------+------------+--------------+
 
 
 
+----------+
| Specimen |
+----------+
|          |
+----------+
 Hemoglobin A1C (2019)
 
+-------------+-------+-----------+------------+--------------+
| Component   | Value | Ref Range | Performed  | Pathologist  |
|             |       |           | At         | Signature    |
+-------------+-------+-----------+------------+--------------+
| Hemoglobin  | 7.0   | %         |            |              |
| A1c         |       |           |            |              |
+-------------+-------+-----------+------------+--------------+
 
 
 
+----------+
| Specimen |
+----------+
| Blood    |
+----------+
 External Lab: BUN (2019)
 
+-----------+-------+-----------+------------+--------------+
| Component | Value | Ref Range | Performed  | Pathologist  |
|           |       |           | At         | Signature    |
+-----------+-------+-----------+------------+--------------+
| BUN,      | 31    |           |            |              |
| External  |       |           |            |              |
+-----------+-------+-----------+------------+--------------+
 External Lab: Glucose (2019)
 
+-----------+-------+-----------+------------+--------------+
| Component | Value | Ref Range | Performed  | Pathologist  |
|           |       |           | At         | Signature    |
+-----------+-------+-----------+------------+--------------+
| Glucose,  | 121   |           |            |              |
| External  |       |           |            |              |
+-----------+-------+-----------+------------+--------------+
 External Lab: ALT (2019)
 
+-----------+-------+-----------+------------+--------------+
| Component | Value | Ref Range | Performed  | Pathologist  |
|           |       |           | At         | Signature    |
+-----------+-------+-----------+------------+--------------+
| ALT,      | 22    |           |            |              |
| External  |       |           |            |              |
+-----------+-------+-----------+------------+--------------+
 External Lab: AST (2019)
 
 
+-----------+-------+-----------+------------+--------------+
| Component | Value | Ref Range | Performed  | Pathologist  |
|           |       |           | At         | Signature    |
+-----------+-------+-----------+------------+--------------+
| AST,      | 30    |           |            |              |
| External  |       |           |            |              |
+-----------+-------+-----------+------------+--------------+
 External Lab: Alkaline Phosphatase (2019)
 
+-----------+-------+-----------+------------+--------------+
| Component | Value | Ref Range | Performed  | Pathologist  |
|           |       |           | At         | Signature    |
+-----------+-------+-----------+------------+--------------+
| ALP,      | 95    |           |            |              |
| External  |       |           |            |              |
+-----------+-------+-----------+------------+--------------+
 External Lab: Bilirubin, Total (2019)
 
+-------------+-------+-----------+------------+--------------+
| Component   | Value | Ref Range | Performed  | Pathologist  |
|             |       |           | At         | Signature    |
+-------------+-------+-----------+------------+--------------+
| Bilirubin,  | 0.7   |           |            |              |
| Total,      |       |           |            |              |
| External    |       |           |            |              |
+-------------+-------+-----------+------------+--------------+
 External Lab: Albumin (2019)
 
+-----------+-------+-----------+------------+--------------+
| Component | Value | Ref Range | Performed  | Pathologist  |
|           |       |           | At         | Signature    |
+-----------+-------+-----------+------------+--------------+
| Albumin,  | 3.6   |           |            |              |
| External  |       |           |            |              |
+-----------+-------+-----------+------------+--------------+
 External Lab: Protein, Total (2019)
 
+-----------+-------+-----------+------------+--------------+
| Component | Value | Ref Range | Performed  | Pathologist  |
|           |       |           | At         | Signature    |
+-----------+-------+-----------+------------+--------------+
| Protein,  | 5.9   |           |            |              |
| Total,    |       |           |            |              |
| External  |       |           |            |              |
+-----------+-------+-----------+------------+--------------+
 External Lab: Phosphorus (2019)
 
+-------------+-------+-----------+------------+--------------+
| Component   | Value | Ref Range | Performed  | Pathologist  |
|             |       |           | At         | Signature    |
+-------------+-------+-----------+------------+--------------+
| Phosphorus, | 3.0   |           |            |              |
|  External   |       |           |            |              |
+-------------+-------+-----------+------------+--------------+
 External Lab: Magnesium (2019)
 
+-------------+-------+-----------+------------+--------------+
| Component   | Value | Ref Range | Performed  | Pathologist  |
|             |       |           | At         | Signature    |
+-------------+-------+-----------+------------+--------------+
 
| Magnesium,  | 1.9   |           |            |              |
| External    |       |           |            |              |
+-------------+-------+-----------+------------+--------------+
 External Lab: Calcium (2019)
 
+-----------+-------+-----------+------------+--------------+
| Component | Value | Ref Range | Performed  | Pathologist  |
|           |       |           | At         | Signature    |
+-----------+-------+-----------+------------+--------------+
| Calcium,  | 9.9   |           |            |              |
| External  |       |           |            |              |
+-----------+-------+-----------+------------+--------------+
 External Lab: Carbon Dioxide (2019)
 
+-----------+-------+-----------+------------+--------------+
| Component | Value | Ref Range | Performed  | Pathologist  |
|           |       |           | At         | Signature    |
+-----------+-------+-----------+------------+--------------+
| Carbon    | 20    |           |            |              |
| Dioxide,  |       |           |            |              |
| External  |       |           |            |              |
+-----------+-------+-----------+------------+--------------+
 External Lab: Chloride (2019)
 
+------------+-------+-----------+------------+--------------+
| Component  | Value | Ref Range | Performed  | Pathologist  |
|            |       |           | At         | Signature    |
+------------+-------+-----------+------------+--------------+
| Chloride,  | 106   |           |            |              |
| External   |       |           |            |              |
+------------+-------+-----------+------------+--------------+
 External Lab: Potassium (2019)
 
+-------------+-------+-----------+------------+--------------+
| Component   | Value | Ref Range | Performed  | Pathologist  |
|             |       |           | At         | Signature    |
+-------------+-------+-----------+------------+--------------+
| Potassium,  | 4.6   |           |            |              |
| External    |       |           |            |              |
+-------------+-------+-----------+------------+--------------+
 External Lab: Sodium (2019)
 
+-----------+-------+-----------+------------+--------------+
| Component | Value | Ref Range | Performed  | Pathologist  |
|           |       |           | At         | Signature    |
+-----------+-------+-----------+------------+--------------+
| Sodium,   | 141   |           |            |              |
| External  |       |           |            |              |
+-----------+-------+-----------+------------+--------------+
 External Lab: CBC (2019)
 
+-----------+-------+-----------+------------+--------------+
| Component | Value | Ref Range | Performed  | Pathologist  |
|           |       |           | At         | Signature    |
+-----------+-------+-----------+------------+--------------+
| WBC,      | 4.2   |           |            |              |
| External  |       |           |            |              |
+-----------+-------+-----------+------------+--------------+
| HGB,      | 12.8  |           |            |              |
| External  |       |           |            |              |
 
+-----------+-------+-----------+------------+--------------+
| HCT,      | 38.9  |           |            |              |
| External  |       |           |            |              |
+-----------+-------+-----------+------------+--------------+
| PLT,      | 128   |           |            |              |
| External  |       |           |            |              |
+-----------+-------+-----------+------------+--------------+
| RBC,      | 3.70  |           |            |              |
| External  |       |           |            |              |
+-----------+-------+-----------+------------+--------------+
| MCV,      | 105   |           |            |              |
| External  |       |           |            |              |
+-----------+-------+-----------+------------+--------------+
| RDW,      | 13.7  |           |            |              |
| External  |       |           |            |              |
+-----------+-------+-----------+------------+--------------+
 External Lab: eGFR (2019)
 
+-----------+-------+-----------+------------+--------------+
| Component | Value | Ref Range | Performed  | Pathologist  |
|           |       |           | At         | Signature    |
+-----------+-------+-----------+------------+--------------+
| eGFR,     | 47    |           |            |              |
| External  |       |           |            |              |
+-----------+-------+-----------+------------+--------------+
 
 
 
+----------+
| Specimen |
+----------+
| Blood    |
+----------+
 External Lab: Creatinine (2019)
 
+-------------+-------+-----------+------------+--------------+
| Component   | Value | Ref Range | Performed  | Pathologist  |
|             |       |           | At         | Signature    |
+-------------+-------+-----------+------------+--------------+
| Creatinine, | 1.13  |           |            |              |
|  External   |       |           |            |              |
+-------------+-------+-----------+------------+--------------+
 
 
 
+----------+
| Specimen |
+----------+
| Blood    |
+----------+
 documented in this encounter
 
 Visit Diagnoses
 Not on filedocumented in this encounter

## 2020-01-08 NOTE — XMS
Encounter Summary
  Created on: 2020
 
 Viviana Ricci
 External Reference #: [id number]
 : 47
 Sex: Female
 
 Demographics
 
 
+-----------------------+----------------------+
| Address               | [address]      |
|                       | [place]  [address] |
+-----------------------+----------------------+
| Home Phone            | [phone]      |
+-----------------------+----------------------+
| Preferred Language    | Unknown              |
+-----------------------+----------------------+
| Marital Status        | Single               |
+-----------------------+----------------------+
| Sikh Affiliation | [id number]                 |
+-----------------------+----------------------+
| Race                  | Unknown              |
+-----------------------+----------------------+
| Ethnic Group          | Unknown              |
+-----------------------+----------------------+
 
 
 Author
 
 
+--------------+--------------------------------------------+
| Author       | [facility]  |
|              | and [facility]                                |
+--------------+--------------------------------------------+
| Organization | [facility]  |
|              | and [facility]                                |
+--------------+--------------------------------------------+
| Address      | Unknown                                    |
+--------------+--------------------------------------------+
| Phone        | Unavailable                                |
+--------------+--------------------------------------------+
 
 
 
 Support
 
 
+----------------+--------------+---------------------+-----------------+
| Name           | Relationship | Address             | Phone           |
+----------------+--------------+---------------------+-----------------+
| Ada/Ed Radhames | ECON         | [place]              | [phone] |
|                |              | [place]  |                 |
|                |              |  [id number]              |                 |
+----------------+--------------+---------------------+-----------------+
 
 
 
 
 Care Team Providers
 
 
+-----------------------+------+-------------+
| Care Team Member Name | Role | Phone       |
+-----------------------+------+-------------+
 PCP  | Unavailable |
+-----------------------+------+-------------+
 
 
 
 Reason for Visit
 
 
+-------------------+----------+
| Reason            | Comments |
+-------------------+----------+
| Medication Refill |          |
+-------------------+----------+
 
 
 
 Encounter Details
 
 
+--------+--------+---------------------+----------------------+-------------------+
| Date   | Type   | Department          | Care Team            | Description       |
+--------+--------+---------------------+----------------------+-------------------+
| 10/29/ | Refill |   [facility]         |   Marzena Moser  | Medication Refill |
|    |        | NEPHROLOGY  [address]   | M, DO  [address]      |                   |
|        |        | [address]   | [address]      |                   |
|        |        | [place], [place]     | [place], [place]      |                   |
|        |        | [address]          | [address]  [phone]  |                   |
|        |        | [phone]        |  [phone] (Fax)  |                   |
+--------+--------+---------------------+----------------------+-------------------+
 
 
 
 Social History
 
 
+----------------+-------+-----------+--------+------+
| Tobacco Use    | Types | Packs/Day | Years  | Date |
|                |       |           | Used   |      |
+----------------+-------+-----------+--------+------+
| Never Assessed |       |           |        |      |
+----------------+-------+-----------+--------+------+
 
 
 
+------------------+---------------+
| Sex Assigned at  | Date Recorded |
| Birth            |               |
+------------------+---------------+
| Not on file      |               |
+------------------+---------------+
 
 
 
 
+----------------+-------------+-------------+
| Job Start Date | Occupation  | Industry    |
+----------------+-------------+-------------+
| Not on file    | Not on file | Not on file |
+----------------+-------------+-------------+
 
 
 
+----------------+--------------+------------+
| Travel History | Travel Start | Travel End |
+----------------+--------------+------------+
 
 
 
+-------------------------------------+
| No recent travel history available. |
+-------------------------------------+
 documented as of this encounter
 
 Plan of Treatment
 
 
+--------+-----------+------------+----------------------+-------------------+
| Date   | Type      | Specialty  | Care Team            | Description       |
+--------+-----------+------------+----------------------+-------------------+
| / | Office    | Cardiology |   Tonia Wilson,    |                   |
|    | Visit     |            | ARNJESSICA  [address]   |                   |
|        |           |            | St  [place], [place]  |                   |
|        |           |            | [address]  [phone]  |                   |
|        |           |            |  [phone] (Fax)  |                   |
+--------+-----------+------------+----------------------+-------------------+
| / | Hospital  | Radiology  |   Popeye Townsend, |                   |
|    | Encounter |            |  MD  [address] |                   |
|        |           |            |  St.  [place],   |                   |
|        |           |            | [place] [address]             |                   |
|        |           |            | [phone]         |                   |
|        |           |            | [phone] (Fax)   |                   |
+--------+-----------+------------+----------------------+-------------------+
| / | Surgery   | Radiology  |   Popeye Townsend, | CV EP PPM SYSTEM  |
2020   |           |            |  MD  [address] | IMPLANT           |
|        |           |            |  St.  [place],   |                   |
|        |           |            | [place] [address]             |                   |
|        |           |            | [phone]         |                   |
|        |           |            | [phone] (Fax)   |                   |
+--------+-----------+------------+----------------------+-------------------+
| 02/10/ | Clinical  | Cardiology |                      |                   |
|    | Support   |            |                      |                   |
+--------+-----------+------------+----------------------+-------------------+
| / | Office    | Cardiology |   Tonia Wilson,    |                   |
|    | Visit     |            | [place] [address]   |                   |
|        |           |            | [place]  |                   |
|        |           |            | [address]  [phone]  |                   |
|        |           |            |  [phone] (Fax)  |                   |
+--------+-----------+------------+----------------------+-------------------+
| / | Off-Site  | Nephrology |   Marzena Moser  |                   |
|    | Visit     |            | DO ETIENNE  [address] [place] [address]      |                   |
|        |           |            | [address]      |                   |
|        |           |            | [place]      |                   |
|        |           |            | [address]  [phone]  |                   |
|        |           |            |  [phone] (Fax)  |                   |
 
+--------+-----------+------------+----------------------+-------------------+
 documented as of this encounter
 
 Visit Diagnoses
 Not on filedocumented in this encounter

## 2020-01-08 NOTE — XMS
Encounter Summary
  Created on: 2020
 
 Viviana Ricci
 External Reference #: [id number]
 : 47
 Sex: Female
 
 Demographics
 
 
+-----------------------+----------------------+
| Address               | [address]      |
|                       | [place]  [address] |
+-----------------------+----------------------+
| Home Phone            | [phone]      |
+-----------------------+----------------------+
| Preferred Language    | Unknown              |
+-----------------------+----------------------+
| Marital Status        | Single               |
+-----------------------+----------------------+
| Episcopal Affiliation | [id number]                 |
+-----------------------+----------------------+
| Race                  | Unknown              |
+-----------------------+----------------------+
| Ethnic Group          | Unknown              |
+-----------------------+----------------------+
 
 
 Author
 
 
+--------------+--------------------------------------------+
| Author       | [facility]  |
|              | and [place]                                |
+--------------+--------------------------------------------+
| Organization | [facility]  |
|              | and [place]                                |
+--------------+--------------------------------------------+
| Address      | Unknown                                    |
+--------------+--------------------------------------------+
| Phone        | Unavailable                                |
+--------------+--------------------------------------------+
 
 
 
 Support
 
 
+----------------+--------------+---------------------+-----------------+
| Name           | Relationship | Address             | Phone           |
+----------------+--------------+---------------------+-----------------+
| Ada/Ed Radhames | ECON         | [address]              | [phone] |
|                |              | [place]  |                 |
|                |              |  [address]              |                 |
+----------------+--------------+---------------------+-----------------+
 
 
 
 
 Care Team Providers
 
 
+-----------------------+------+-------------+
| Care Team Member Name | Role | Phone       |
+-----------------------+------+-------------+
 PCP  | Unavailable |
+-----------------------+------+-------------+
 
 
 
 Encounter Details
 
 
+--------+-------------+----------------------+----------------------+-------------------+
| Date   | Type        | Department           | Care Team            | Description       |
+--------+-------------+----------------------+----------------------+-------------------+
| / | Orders Only |   [facility] [place]          |   Andres Avina    | JACK (obstructive  |
| 2018   |             | PULMONARY  [address]     | [address]    | sleep apnea)      |
|        |             | [place], | [address]  [place],  | (Primary Dx)      |
|        |             |  [place] [address]       | [place] [address]             |                   |
|        |             | [phone]         | [phone]         |                   |
|        |             |                      | [phone] (Fax)   |                   |
+--------+-------------+----------------------+----------------------+-------------------+
 
 
 
 Social History
 
 
+--------------+-------+-----------+--------+------+
| Tobacco Use  | Types | Packs/Day | Years  | Date |
|              |       |           | Used   |      |
+--------------+-------+-----------+--------+------+
| Never Smoker |       |           |        |      |
+--------------+-------+-----------+--------+------+
 
 
 
+---------------------+---+---+---+
| Smokeless Tobacco:  |   |   |   |
| Never Used          |   |   |   |
+---------------------+---+---+---+
 
 
 
+---------------------------------------------------------------+
| Comments: some second hand smoke exposure, but fairly minimal |
+---------------------------------------------------------------+
 
 
 
+-------------+-------------+---------+----------+
| Alcohol Use | Drinks/Week | oz/Week | Comments |
+-------------+-------------+---------+----------+
| No          |             |         |          |
+-------------+-------------+---------+----------+
 
 
 
 
+------------------+---------------+
| Sex Assigned at  | Date Recorded |
| Birth            |               |
+------------------+---------------+
| Not on file      |               |
+------------------+---------------+
 
 
 
+----------------+-------------+-------------+
| Job Start Date | Occupation  | Industry    |
+----------------+-------------+-------------+
| Not on file    | Not on file | Not on file |
+----------------+-------------+-------------+
 
 
 
+----------------+--------------+------------+
| Travel History | Travel Start | Travel End |
+----------------+--------------+------------+
 
 
 
+-------------------------------------+
| No recent travel history available. |
+-------------------------------------+
 documented as of this encounter
 
 Plan of Treatment
 
 
+--------+-----------+------------+----------------------+-------------------+
| Date   | Type      | Specialty  | Care Team            | Description       |
+--------+-----------+------------+----------------------+-------------------+
| / | Office    | Cardiology |   Tonia Wilson,    |                   |
|    | Visit     |            | [address]   |                   |
|        |           |            | St  [place]  |                   |
|        |           |            | [address]  [phone]  |                   |
|        |           |            |  [phone] (Fax)  |                   |
+--------+-----------+------------+----------------------+-------------------+
| / | Hospital  | Radiology  |   Popeye Townsend, |                   |
|    | Encounter |            |  MD  [address] |                   |
|        |           |            |  St.  [place],   |                   |
|        |           |            | [place] [address]             |                   |
|        |           |            | [phone]         |                   |
|        |           |            | [phone] (Fax)   |                   |
+--------+-----------+------------+----------------------+-------------------+
| / | Surgery   | Radiology  |   Popeye Townsend, | CV EP PPM SYSTEM  |
|    |           |            |  MD  [address] | IMPLANT           |
|        |           |            |  St.  [place],   |                   |
|        |           |            | [place] [address]             |                   |
|        |           |            | [phone]         |                   |
|        |           |            | [phone] (Fax)   |                   |
+--------+-----------+------------+----------------------+-------------------+
| 02/10/ | Clinical  | Cardiology |                      |                   |
2020   | Support   |            |                      |                   |
+--------+-----------+------------+----------------------+-------------------+
| / | Office    | Cardiology |   Tonia Wilson,    |                   |
|    | Visit     |            | ARNP  [address]   |                   |
 
|        |           |            | St  [place]  |                   |
|        |           |            | [address]  [phone]  |                   |
|        |           |            |  [phone] (Fax)  |                   |
+--------+-----------+------------+----------------------+-------------------+
| / | Off-Site  | Nephrology |   Marzena Moser  |                   |
|    | Visit     |            | DO ETIENNE  [address]      |                   |
|        |           |            | [address]      |                   |
|        |           |            | [place]      |                   |
|        |           |            | [address]  [phone]  |                   |
|        |           |            |  [phone] (Fax)  |                   |
+--------+-----------+------------+----------------------+-------------------+
 documented as of this encounter
 
 Visit Diagnoses
 
 
+----------------------------------------------------------------------------------------+
| Diagnosis                                                                              |
+----------------------------------------------------------------------------------------+
|   JACK (obstructive sleep apnea) - Primary  Obstructive sleep apnea (adult) (pediatric) |
+----------------------------------------------------------------------------------------+
 documented in this encounter

## 2020-01-08 NOTE — XMS
Encounter Summary
  Created on: 2020
 
 Viviana Ricci
 External Reference #: [id number]
 : 47
 Sex: Female
 
 Demographics
 
 
+-----------------------+----------------------+
| Address               | [address]      |
|                       | [place]  [address] |
+-----------------------+----------------------+
| Home Phone            | [phone]      |
+-----------------------+----------------------+
| Preferred Language    | Unknown              |
+-----------------------+----------------------+
| Marital Status        | Single               |
+-----------------------+----------------------+
| Faith Affiliation | 1009                 |
+-----------------------+----------------------+
| Race                  | Unknown              |
+-----------------------+----------------------+
| Ethnic Group          | Unknown              |
+-----------------------+----------------------+
 
 
 Author
 
 
+--------------+--------------------------------------------+
| Author       | [facility]  |
|              | and [place]                                |
+--------------+--------------------------------------------+
| Organization | [facility]  |
|              | and [place]                                |
+--------------+--------------------------------------------+
| Address      | Unknown                                    |
+--------------+--------------------------------------------+
| Phone        | Unavailable                                |
+--------------+--------------------------------------------+
 
 
 
 Support
 
 
+----------------+--------------+---------------------+-----------------+
| Name           | Relationship | Address             | Phone           |
+----------------+--------------+---------------------+-----------------+
| Ada/Ed Radhames | ECON         | [address]              | [phone] |
|                |              | [place]  |                 |
|                |              |  [address]              |                 |
+----------------+--------------+---------------------+-----------------+
 
 
 
 
 Care Team Providers
 
 
+-----------------------+------+-------------+
| Care Team Member Name | Role | Phone       |
+-----------------------+------+-------------+
 PCP  | Unavailable |
+-----------------------+------+-------------+
 
 
 
 Reason for Visit
 
 
+-------------------+----------+
| Reason            | Comments |
+-------------------+----------+
| Medication Refill |          |
+-------------------+----------+
 
 
 
 Encounter Details
 
 
+--------+--------+---------------------+----------------------+-------------------+
| Date   | Type   | Department          | Care Team            | Description       |
+--------+--------+---------------------+----------------------+-------------------+
| / | Refill |   [place]         |   Marzena Moser  | Medication Refill |
| 2017   |        | NEPHROLOGY  [address]   | M, DO  [address]      |                   |
|        |        | [address]   | [address]      |                   |
|        |        | [place], [place]     | [place], [place]      |                   |
|        |        | [address]          | [address]  [phone]  |                   |
|        |        | [phone]        |  [phone] (Fax)  |                   |
+--------+--------+---------------------+----------------------+-------------------+
 
 
 
 Social History
 
 
+--------------+-------+-----------+--------+------+
| Tobacco Use  | Types | Packs/Day | Years  | Date |
|              |       |           | Used   |      |
+--------------+-------+-----------+--------+------+
| Never Smoker |       |           |        |      |
+--------------+-------+-----------+--------+------+
 
 
 
+---------------------+---+---+---+
| Smokeless Tobacco:  |   |   |   |
| Never Used          |   |   |   |
+---------------------+---+---+---+
 
 
 
+---------------------------------------------------------------+
| Comments: some second hand smoke exposure, but fairly minimal |
 
+---------------------------------------------------------------+
 
 
 
+-------------+-------------+---------+----------+
| Alcohol Use | Drinks/Week | oz/Week | Comments |
+-------------+-------------+---------+----------+
| No          |             |         |          |
+-------------+-------------+---------+----------+
 
 
 
+------------------+---------------+
| Sex Assigned at  | Date Recorded |
| Birth            |               |
+------------------+---------------+
| Not on file      |               |
+------------------+---------------+
 
 
 
+----------------+-------------+-------------+
| Job Start Date | Occupation  | Industry    |
+----------------+-------------+-------------+
| Not on file    | Not on file | Not on file |
+----------------+-------------+-------------+
 
 
 
+----------------+--------------+------------+
| Travel History | Travel Start | Travel End |
+----------------+--------------+------------+
 
 
 
+-------------------------------------+
| No recent travel history available. |
+-------------------------------------+
 documented as of this encounter
 
 Plan of Treatment
 
 
+--------+-----------+------------+----------------------+-------------------+
| Date   | Type      | Specialty  | Care Team            | Description       |
+--------+-----------+------------+----------------------+-------------------+
| / | Office    | Cardiology |   HellalfredoTonia,    |                   |
|    | Visit     |            | ARNP  [address]   |                   |
|        |           |            | St  [place], [place]  |                   |
|        |           |            | [address]  [phone]  |                   |
|        |           |            |  [phone] (Fax)  |                   |
+--------+-----------+------------+----------------------+-------------------+
| / | Hospital  | Radiology  |   Popeye Townsend, |                   |
|    | Encounter |            |  MD  [address] |                   |
|        |           |            |  St.  [place],   |                   |
|        |           |            | [place] [address]             |                   |
|        |           |            | [phone]         |                   |
|        |           |            | [phone] (Fax)   |                   |
+--------+-----------+------------+----------------------+-------------------+
| / | Surgery   | Radiology  |   Popeye Townsend, | CV EP PPM SYSTEM  |
 
|    |           |            |  MD  [address] | IMPLANT           |
|        |           |            |  St.  [place],   |                   |
|        |           |            | [place] [address]             |                   |
|        |           |            | [phone]         |                   |
|        |           |            | [phone] (Fax)   |                   |
+--------+-----------+------------+----------------------+-------------------+
| 02/10/ | Clinical  | Cardiology |                      |                   |
|    | Support   |            |                      |                   |
+--------+-----------+------------+----------------------+-------------------+
| / | Office    | Cardiology |   Tonia Wilson,    |                   |
|    | Visit     |            | ARNP  [address]   |                   |
|        |           |            | [place]  |                   |
|        |           |            | [address]  [phone]  |                   |
|        |           |            |  [phone] (Fax)  |                   |
+--------+-----------+------------+----------------------+-------------------+
| / | Off-Site  | Nephrology |   Marzena Moser  |                   |
|    | Visit     |            | DO ETIENNE  [address] [place] [address]      |                   |
|        |           |            | [address]      |                   |
|        |           |            | [place]      |                   |
|        |           |            | [address]  [phone]  |                   |
|        |           |            |  [phone] (Fax)  |                   |
+--------+-----------+------------+----------------------+-------------------+
 documented as of this encounter
 
 Visit Diagnoses
 Not on filedocumented in this encounter

## 2020-01-08 NOTE — XMS
Encounter Summary
  Created on: 2020
 
 Viviana Ricci
 External Reference #: [id number]
 : 47
 Sex: Female
 
 Demographics
 
 
+-----------------------+----------------------+
| Address               | [address]      |
|                       | [place]  [address] |
+-----------------------+----------------------+
| Home Phone            | [phone]      |
+-----------------------+----------------------+
| Preferred Language    | Unknown              |
+-----------------------+----------------------+
| Marital Status        | Single               |
+-----------------------+----------------------+
| Bahai Affiliation | [id number]                 |
+-----------------------+----------------------+
| Race                  | Unknown              |
+-----------------------+----------------------+
| Ethnic Group          | Unknown              |
+-----------------------+----------------------+
 
 
 Author
 
 
+--------------+--------------------------------------------+
| Author       | [facility]  |
|              | and [place]                                |
+--------------+--------------------------------------------+
| Organization | [facility]  |
|              | and [place]                                |
+--------------+--------------------------------------------+
| Address      | Unknown                                    |
+--------------+--------------------------------------------+
| Phone        | Unavailable                                |
+--------------+--------------------------------------------+
 
 
 
 Support
 
 
+----------------+--------------+---------------------+-----------------+
| Name           | Relationship | Address             | Phone           |
+----------------+--------------+---------------------+-----------------+
| Ada/Ed Radhames | ECON         | [address]              | [phone] |
|                |              | [place]  |                 |
|                |              |  [address]              |                 |
+----------------+--------------+---------------------+-----------------+
 
 
 
 
 Care Team Providers
 
 
+-----------------------+------+-------------+
| Care Team Member Name | Role | Phone       |
+-----------------------+------+-------------+
 PCP  | Unavailable |
+-----------------------+------+-------------+
 
 
 
 Reason for Visit
 
 
+----------+----------+
| Reason   | Comments |
+----------+----------+
| Wheezing |          |
+----------+----------+
 
 
 
 Encounter Details
 
 
+--------+-----------+---------------------+----------------------+-------------+
| Date   | Type      | Department          | Care Team            | Description |
+--------+-----------+---------------------+----------------------+-------------+
| / | Telephone |   [facility]         |   Marzena Moser  | Wheezing    |
|    |           | NEPHROLOGY  [address]   | M, DO  [address]      |             |
|        |           | [address]   | [address]      |             |
|        |           | [place], [place]     | [place], [place]      |             |
|        |           | [address]          | [address]  [phone]  |             |
|        |           | [phone]        |  [phone] (Fax)  |             |
+--------+-----------+---------------------+----------------------+-------------+
 
 
 
 Social History
 
 
+--------------+-------+-----------+--------+------+
| Tobacco Use  | Types | Packs/Day | Years  | Date |
|              |       |           | Used   |      |
+--------------+-------+-----------+--------+------+
| Never Smoker |       |           |        |      |
+--------------+-------+-----------+--------+------+
 
 
 
+---------------------+---+---+---+
| Smokeless Tobacco:  |   |   |   |
| Never Used          |   |   |   |
+---------------------+---+---+---+
 
 
 
+---------------------------------------------------------------+
| Comments: some second hand smoke exposure, but fairly minimal |
 
+---------------------------------------------------------------+
 
 
 
+-------------+-------------+---------+----------+
| Alcohol Use | Drinks/Week | oz/Week | Comments |
+-------------+-------------+---------+----------+
| No          |             |         |          |
+-------------+-------------+---------+----------+
 
 
 
+------------------+---------------+
| Sex Assigned at  | Date Recorded |
| Birth            |               |
+------------------+---------------+
| Not on file      |               |
+------------------+---------------+
 
 
 
+----------------+-------------+-------------+
| Job Start Date | Occupation  | Industry    |
+----------------+-------------+-------------+
| Not on file    | Not on file | Not on file |
+----------------+-------------+-------------+
 
 
 
+----------------+--------------+------------+
| Travel History | Travel Start | Travel End |
+----------------+--------------+------------+
 
 
 
+-------------------------------------+
| No recent travel history available. |
+-------------------------------------+
 documented as of this encounter
 
 Plan of Treatment
 
 
+--------+-----------+------------+----------------------+-------------------+
| Date   | Type      | Specialty  | Care Team            | Description       |
+--------+-----------+------------+----------------------+-------------------+
| / | Office    | Cardiology |   Tonia Wilson,    |                   |
| 2020   | Visit     |            | ARNP  [address]   |                   |
|        |           |            | St  [place]  |                   |
|        |           |            | [address]  [phone]  |                   |
|        |           |            |  [phone] (Fax)  |                   |
+--------+-----------+------------+----------------------+-------------------+
| / | Hospital  | Radiology  |   Popeye Townsend, |                   |
|    | Encounter |            |  MD  [address] |                   |
|        |           |            |  St.  [place],   |                   |
|        |           |            | [place] [address]             |                   |
|        |           |            | [phone]         |                   |
|        |           |            | [phone] (Fax)   |                   |
+--------+-----------+------------+----------------------+-------------------+
| / | Surgery   | Radiology  |   Popeye Townsend, | CV EP PPM SYSTEM  |
 
|    |           |            |  MD  [address] | IMPLANT           |
|        |           |            |  St.  [place],   |                   |
|        |           |            | [place] [address]             |                   |
|        |           |            | [phone]         |                   |
|        |           |            | [phone] (Fax)   |                   |
+--------+-----------+------------+----------------------+-------------------+
| 02/10/ | Clinical  | Cardiology |                      |                   |
|    | Support   |            |                      |                   |
+--------+-----------+------------+----------------------+-------------------+
| / | Office    | Cardiology |   Tonia Wilson,    |                   |
|    | Visit     |            | BELKIS  [address]   |                   |
|        |           |            | [place]  |                   |
|        |           |            | [address]  [phone]  |                   |
|        |           |            |  [phone] (Fax)  |                   |
+--------+-----------+------------+----------------------+-------------------+
| / | Off-Site  | Nephrology |   Marzena Moser  |                   |
|    | Visit     |            | DO ETIENNE  [address] [place] [address]      |                   |
|        |           |            | [address]      |                   |
|        |           |            | [place]      |                   |
|        |           |            | [address]  [phone]  |                   |
|        |           |            |  [phone] (Fax)  |                   |
+--------+-----------+------------+----------------------+-------------------+
 documented as of this encounter
 
 Visit Diagnoses
 Not on filedocumented in this encounter

## 2020-01-08 NOTE — XMS
Encounter Summary
  Created on: 2020
 
 Viviana Ricci
 External Reference #: [id number]
 : 47
 Sex: Female
 
 Demographics
 
 
+-----------------------+----------------------+
| Address               | [address]      |
|                       | [place]  [address] |
+-----------------------+----------------------+
| Home Phone            | [phone]      |
+-----------------------+----------------------+
| Preferred Language    | Unknown              |
+-----------------------+----------------------+
| Marital Status        | Single               |
+-----------------------+----------------------+
| Rastafarian Affiliation | [id number]                 |
+-----------------------+----------------------+
| Race                  | Unknown              |
+-----------------------+----------------------+
| Ethnic Group          | Unknown              |
+-----------------------+----------------------+
 
 
 Author
 
 
+--------------+--------------------------------------------+
| Author       | [facility] [place]  |
|              | and [place]                                |
+--------------+--------------------------------------------+
| Organization | [facility] [place]  |
|              | and [place]                                |
+--------------+--------------------------------------------+
| Address      | Unknown                                    |
+--------------+--------------------------------------------+
| Phone        | Unavailable                                |
+--------------+--------------------------------------------+
 
 
 
 Support
 
 
+----------------+--------------+---------------------+-----------------+
| Name           | Relationship | Address             | Phone           |
+----------------+--------------+---------------------+-----------------+
| Ada/Ed Radhames | ECON         | [address]              | [phone] |
|                |              | [place]  |                 |
|                |              |  [address]              |                 |
+----------------+--------------+---------------------+-----------------+
 
 
 
 
 Care Team Providers
 
 
+-----------------------+------+-------------+
| Care Team Member Name | Role | Phone       |
+-----------------------+------+-------------+
 PCP  | Unavailable |
+-----------------------+------+-------------+
 
 
 
 Encounter Details
 
 
+--------+-----------+----------------------+----------------------+-------------+
| Date   | Type      | Department           | Care Team            | Description |
+--------+-----------+----------------------+----------------------+-------------+
| / | [facility]  |   [facility] |   Marzena Moser  |             |
|    | Encounter |  MED CTR XRAY  [address] | M, DO  [address]      |             |
|        |           |  [place]       | [address]      |             |
|        |           | [place] [address] | [place]      |             |
|        |           |   [phone]       | [address] [phone]  |             |
|        |           |                      |  [phone] (Fax)  |             |
+--------+-----------+----------------------+----------------------+-------------+
 
 
 
 Social History
 
 
+----------------+-------+-----------+--------+------+
| Tobacco Use    | Types | Packs/Day | Years  | Date |
|                |       |           | Used   |      |
+----------------+-------+-----------+--------+------+
| Never Assessed |       |           |        |      |
+----------------+-------+-----------+--------+------+
 
 
 
+------------------+---------------+
| Sex Assigned at  | Date Recorded |
| Birth            |               |
+------------------+---------------+
| Not on file      |               |
+------------------+---------------+
 
 
 
+----------------+-------------+-------------+
| Job Start Date | Occupation  | Industry    |
+----------------+-------------+-------------+
| Not on file    | Not on file | Not on file |
+----------------+-------------+-------------+
 
 
 
+----------------+--------------+------------+
| Travel History | Travel Start | Travel End |
+----------------+--------------+------------+
 
 
 
 
+-------------------------------------+
| No recent travel history available. |
+-------------------------------------+
 documented as of this encounter
 
 Plan of Treatment
 
 
+--------+-----------+------------+----------------------+-------------------+
| Date   | Type      | Specialty  | Care Team            | Description       |
+--------+-----------+------------+----------------------+-------------------+
| / | Office    | Cardiology |   Tonia Wilson,    |                   |
|    | Visit     |            | ARNJESSICA  [address]   |                   |
|        |           |            | St  [place], [place]  |                   |
|        |           |            | [address]  [phone]  |                   |
|        |           |            |  [phone] (Fax)  |                   |
+--------+-----------+------------+----------------------+-------------------+
| / | Hospital  | Radiology  |   Popeye Townsend, |                   |
|    | Encounter |            |  MD  [address] |                   |
|        |           |            |  St. [place],   |                   |
|        |           |            | [place] [address]             |                   |
|        |           |            | [phone]         |                   |
|        |           |            | [phone] (Fax)   |                   |
+--------+-----------+------------+----------------------+-------------------+
| / | Surgery   | Radiology  |   Popeye Townsend, | CV EP PPM SYSTEM  |
|    |           |            |  MD  [address] | IMPLANT           |
|        |           |            |  [place],   |                   |
|        |           |            | [place] [address]             |                   |
|        |           |            | [phone]         |                   |
|        |           |            | [phone] (Fax)   |                   |
+--------+-----------+------------+----------------------+-------------------+
| 02/10/ | Clinical  | Cardiology |                      |                   |
|    | Support   |            |                      |                   |
+--------+-----------+------------+----------------------+-------------------+
| / | Office    | Cardiology |   Tonia Wilson,    |                   |
|    | Visit     |            | BELKIS  [address]   |                   |
|        |           |            | [place]  |                   |
|        |           |            | [address]  [phone]  |                   |
|        |           |            |  [phone] (Fax)  |                   |
+--------+-----------+------------+----------------------+-------------------+
| / | Off-Site  | Nephrology |   Marzena Moser  |                   |
|    | Visit     |            | DO ETIENNE  [address] [place] [address]      |                   |
|        |           |            | [address]      |                   |
|        |           |            | [place]      |                   |
|        |           |            | [address] [phone]  |                   |
|        |           |            |  [phone] (Fax)  |                   |
+--------+-----------+------------+----------------------+-------------------+
 documented as of this encounter
 
 Visit Diagnoses
 Not on filedocumented in this encounter

## 2020-01-08 NOTE — XMS
Encounter Summary
  Created on: 2020
 
 Viviana Ricci
 External Reference #: [id number]
 : 47
 Sex: Female
 
 Demographics
 
 
+-----------------------+----------------------+
| Address               | [address]      |
|                       | [place]  [address] |
+-----------------------+----------------------+
| Home Phone            | [phone]      |
+-----------------------+----------------------+
| Preferred Language    | Unknown              |
+-----------------------+----------------------+
| Marital Status        | Single               |
+-----------------------+----------------------+
| Adventism Affiliation | [id number]                 |
+-----------------------+----------------------+
| Race                  | Unknown              |
+-----------------------+----------------------+
| Ethnic Group          | Unknown              |
+-----------------------+----------------------+
 
 
 Author
 
 
+--------------+--------------------------------------------+
| Author       | [facility]  |
|              | and [facility]                                |
+--------------+--------------------------------------------+
| Organization | [facility]  |
|              | and [facility]                                |
+--------------+--------------------------------------------+
| Address      | Unknown                                    |
+--------------+--------------------------------------------+
| Phone        | Unavailable                                |
+--------------+--------------------------------------------+
 
 
 
 Support
 
 
+----------------+--------------+---------------------+-----------------+
| Name           | Relationship | Address             | Phone           |
+----------------+--------------+---------------------+-----------------+
| Ada/Ed Radhames | ECON         | [place]              | [phone] |
|                |              | [place]  |                 |
|                |              |  [id number]              |                 |
+----------------+--------------+---------------------+-----------------+
 
 
 
 
 Care Team Providers
 
 
+-----------------------+------+-------------+
| Care Team Member Name | Role | Phone       |
+-----------------------+------+-------------+
 PCP  | Unavailable |
+-----------------------+------+-------------+
 
 
 
 Reason for Visit
 
 
+-------------------+----------+
| Reason            | Comments |
+-------------------+----------+
| Medication Refill |          |
+-------------------+----------+
 
 
 
 Encounter Details
 
 
+--------+--------+---------------------+----------------------+-------------------+
| Date   | Type   | Department          | Care Team            | Description       |
+--------+--------+---------------------+----------------------+-------------------+
| / | Refill |   [facility]         |   Marzena Moser  | Medication Refill |
| 2017   |        | NEPHROLOGY  [address]   | M, DO  [address]      |                   |
|        |        | [address]   | [address]      |                   |
|        |        | [place], [place]     | [place], [place]      |                   |
|        |        | [address]          | [address]  [phone]  |                   |
|        |        | [phone]        |  [phone] (Fax)  |                   |
+--------+--------+---------------------+----------------------+-------------------+
 
 
 
 Social History
 
 
+--------------+-------+-----------+--------+------+
| Tobacco Use  | Types | Packs/Day | Years  | Date |
|              |       |           | Used   |      |
+--------------+-------+-----------+--------+------+
| Never Smoker |       |           |        |      |
+--------------+-------+-----------+--------+------+
 
 
 
+---------------------+---+---+---+
| Smokeless Tobacco:  |   |   |   |
| Never Used          |   |   |   |
+---------------------+---+---+---+
 
 
 
+---------------------------------------------------------------+
| Comments: some second hand smoke exposure, but fairly minimal |
 
+---------------------------------------------------------------+
 
 
 
+-------------+-------------+---------+----------+
| Alcohol Use | Drinks/Week | oz/Week | Comments |
+-------------+-------------+---------+----------+
| No          |             |         |          |
+-------------+-------------+---------+----------+
 
 
 
+------------------+---------------+
| Sex Assigned at  | Date Recorded |
| Birth            |               |
+------------------+---------------+
| Not on file      |               |
+------------------+---------------+
 
 
 
+----------------+-------------+-------------+
| Job Start Date | Occupation  | Industry    |
+----------------+-------------+-------------+
| Not on file    | Not on file | Not on file |
+----------------+-------------+-------------+
 
 
 
+----------------+--------------+------------+
| Travel History | Travel Start | Travel End |
+----------------+--------------+------------+
 
 
 
+-------------------------------------+
| No recent travel history available. |
+-------------------------------------+
 documented as of this encounter
 
 Plan of Treatment
 
 
+--------+-----------+------------+----------------------+-------------------+
| Date   | Type      | Specialty  | Care Team            | Description       |
+--------+-----------+------------+----------------------+-------------------+
| / | Office    | Cardiology |   HellalfredoTonia,    |                   |
|    | Visit     |            | ARNP  [address]   |                   |
|        |           |            | St  [place], [place]  |                   |
|        |           |            | [address]  [phone]  |                   |
|        |           |            |  [phone] (Fax)  |                   |
+--------+-----------+------------+----------------------+-------------------+
| / | Hospital  | Radiology  |   Popeye Townsend, |                   |
|    | Encounter |            |  MD  [address] |                   |
|        |           |            |  St.  [place],   |                   |
|        |           |            | [place] [address]             |                   |
|        |           |            | [phone]         |                   |
|        |           |            | [phone] (Fax)   |                   |
+--------+-----------+------------+----------------------+-------------------+
| / | Surgery   | Radiology  |   Popeye Townsend, | CV EP PPM SYSTEM  |
 
|    |           |            |  MD  [address] | IMPLANT           |
|        |           |            |  St.  [place],   |                   |
|        |           |            | [place] [address]             |                   |
|        |           |            | [phone]         |                   |
|        |           |            | [phone] (Fax)   |                   |
+--------+-----------+------------+----------------------+-------------------+
| 02/10/ | Clinical  | Cardiology |                      |                   |
|    | Support   |            |                      |                   |
+--------+-----------+------------+----------------------+-------------------+
| / | Office    | Cardiology |   Tonia Wilson,    |                   |
|    | Visit     |            | ARNP  [address]   |                   |
|        |           |            | [place]  |                   |
|        |           |            | [address]  [phone]  |                   |
|        |           |            |  [phone] (Fax)  |                   |
+--------+-----------+------------+----------------------+-------------------+
| / | Off-Site  | Nephrology |   Marzena Moser  |                   |
|    | Visit     |            | DO ETIENNE  [address] [place] [address]      |                   |
|        |           |            | [address]      |                   |
|        |           |            | [place]      |                   |
|        |           |            | [address]  [phone]  |                   |
|        |           |            |  [phone] (Fax)  |                   |
+--------+-----------+------------+----------------------+-------------------+
 documented as of this encounter
 
 Visit Diagnoses
 Not on filedocumented in this encounter

## 2020-01-08 NOTE — XMS
Encounter Summary
  Created on: 2020
 
 Viviana Ricci
 External Reference #: [id number]
 : 47
 Sex: Female
 
 Demographics
 
 
+-----------------------+----------------------+
| Address               | [address]      |
|                       | [place]  [address] |
+-----------------------+----------------------+
| Home Phone            | [phone]      |
+-----------------------+----------------------+
| Preferred Language    | Unknown              |
+-----------------------+----------------------+
| Marital Status        | Single               |
+-----------------------+----------------------+
| Islam Affiliation | [id number]                 |
+-----------------------+----------------------+
| Race                  | Unknown              |
+-----------------------+----------------------+
| Ethnic Group          | Unknown              |
+-----------------------+----------------------+
 
 
 Author
 
 
+--------------+--------------------------------------------+
| Author       | [facility]  |
|              | and [facility]                                |
+--------------+--------------------------------------------+
| Organization | [facility]  |
|              | and [facility]                                |
+--------------+--------------------------------------------+
| Address      | Unknown                                    |
+--------------+--------------------------------------------+
| Phone        | Unavailable                                |
+--------------+--------------------------------------------+
 
 
 
 Support
 
 
+----------------+--------------+---------------------+-----------------+
| Name           | Relationship | Address             | Phone           |
+----------------+--------------+---------------------+-----------------+
| Ada/Ed Radhames | ECON         | [place]              | [phone] |
|                |              | [place]  |                 |
|                |              |  [address]              |                 |
+----------------+--------------+---------------------+-----------------+
 
 
 
 
 Care Team Providers
 
 
+-----------------------+------+-------------+
| Care Team Member Name | Role | Phone       |
+-----------------------+------+-------------+
 PCP  | Unavailable |
+-----------------------+------+-------------+
 
 
 
 Encounter Details
 
 
+--------+----------+---------------------+----------------------+-------------+
| Date   | Type     | Department          | Care Team            | Description |
+--------+----------+---------------------+----------------------+-------------+
| / | Abstract |   [facility]         |   Marzena Moser  |             |
|    |          | NEPHROLOGY  [address]   | M, DO  [address]      |             |
|        |          | [address]   | [address]      |             |
|        |          | [place], [place]     | [place]      |             |
|        |          | [address]          | [address]  [phone]  |             |
|        |          | [phone]        |  [phone] (Fax)  |             |
+--------+----------+---------------------+----------------------+-------------+
 
 
 
 Social History
 
 
+--------------+-------+-----------+--------+------+
| Tobacco Use  | Types | Packs/Day | Years  | Date |
|              |       |           | Used   |      |
+--------------+-------+-----------+--------+------+
| Never Smoker |       |           |        |      |
+--------------+-------+-----------+--------+------+
 
 
 
+---------------------+---+---+---+
| Smokeless Tobacco:  |   |   |   |
| Never Used          |   |   |   |
+---------------------+---+---+---+
 
 
 
+-------------+-------------+---------+----------+
| Alcohol Use | Drinks/Week | oz/Week | Comments |
+-------------+-------------+---------+----------+
| No          |             |         |          |
+-------------+-------------+---------+----------+
 
 
 
+------------------+---------------+
| Sex Assigned at  | Date Recorded |
| Birth            |               |
+------------------+---------------+
| Not on file      |               |
 
+------------------+---------------+
 
 
 
+----------------+-------------+-------------+
| Job Start Date | Occupation  | Industry    |
+----------------+-------------+-------------+
| Not on file    | Not on file | Not on file |
+----------------+-------------+-------------+
 
 
 
+----------------+--------------+------------+
| Travel History | Travel Start | Travel End |
+----------------+--------------+------------+
 
 
 
+-------------------------------------+
| No recent travel history available. |
+-------------------------------------+
 documented as of this encounter
 
 Plan of Treatment
 
 
+--------+-----------+------------+----------------------+-------------------+
| Date   | Type      | Specialty  | Care Team            | Description       |
+--------+-----------+------------+----------------------+-------------------+
| / | Office    | Cardiology |   Tonia Wilson,    |                   |
|    | Visit     |            | ARNP  [address]   |                   |
|        |           |            | St [place]  |                   |
|        |           |            | [address] [phone]  |                   |
|        |           |            |  [phone] (Fax)  |                   |
+--------+-----------+------------+----------------------+-------------------+
| / | Hospital  | Radiology  |   Popeye Townsend, |                   |
|    | Encounter |            |  MD  [address] |                   |
|        |           |            |  St. [place]   |                   |
|        |           |            | [place] [address]             |                   |
|        |           |            | [phone]         |                   |
|        |           |            | [phone] (Fax)   |                   |
+--------+-----------+------------+----------------------+-------------------+
| / | Surgery   | Radiology  |   CaryYinmarissa, | CV EP PPM SYSTEM  |
2020   |           |            |  MD  [address] | IMPLANT           |
|        |           |            |  St. [place],   |                   |
|        |           |            | [place] [address]             |                   |
|        |           |            | [phone]         |                   |
|        |           |            | [phone] (Fax)   |                   |
+--------+-----------+------------+----------------------+-------------------+
| 02/10/ | Clinical  | Cardiology |                      |                   |
|    | Support   |            |                      |                   |
+--------+-----------+------------+----------------------+-------------------+
| / | Office    | Cardiology |   Tonia Wilson,    |                   |
2020   | Visit     |            | BELKIS  [address]   |                   |
|        |           |            |   [place]  |                   |
|        |           |            | [address]  [phone]  |                   |
|        |           |            |  [phone] (Fax)  |                   |
+--------+-----------+------------+----------------------+-------------------+
| / | Off-Site  | Nephrology |   Marzena Moser  |                   |
2020   | Visit     |            | DO [address]      |                   |
 
|        |           |            | [address]      |                   |
|        |           |            | [place]      |                   |
|        |           |            | [address]  [phone]  |                   |
|        |           |            |  [phone] (Fax)  |                   |
+--------+-----------+------------+----------------------+-------------------+
 documented as of this encounter
 
 Procedures
 
 
+----------------------+--------+------------+----------------------+----------------------+
| Procedure Name       | Priori | Date/Time  | Associated Diagnosis | Comments             |
|                      | ty     |            |                      |                      |
+----------------------+--------+------------+----------------------+----------------------+
| CMP14+LP+CBC/D/PLT+T | Routin | 2012 |                      |   Results for this   |
| SH+UA/M (NON ORD)    | e      |            |                      | procedure are in the |
|                      |        |            |                      |  results section.    |
+----------------------+--------+------------+----------------------+----------------------+
 documented in this encounter
 
 Results
 CMP14+LP+CBC/D/Plt+TSH+UA/M (2012)
 
+-------------+-----------+-----------------+------------+--------------+
| Component   | Value     | Ref Range       | Performed  | Pathologist  |
|             |           |                 | At         | Signature    |
+-------------+-----------+-----------------+------------+--------------+
| Phosphorus  | 2.7       | 2.6 - 4.4 mg/dL |            |              |
+-------------+-----------+-----------------+------------+--------------+
| Cholesterol | 166       | mg/dL           |            |              |
+-------------+-----------+-----------------+------------+--------------+
| Triglycerid | 230       |                 |            |              |
| es          |           |                 |            |              |
+-------------+-----------+-----------------+------------+--------------+
| HDL         | 43        | mg/dL           |            |              |
+-------------+-----------+-----------------+------------+--------------+
| LDL         | 77        |                 |            |              |
| Cholesterol |           |                 |            |              |
+-------------+-----------+-----------------+------------+--------------+
| Na          | 141       | mmol/L          |            |              |
+-------------+-----------+-----------------+------------+--------------+
| K           | 5.1       | mmol/L          |            |              |
+-------------+-----------+-----------------+------------+--------------+
| Cl          | 113       | mmol/L          |            |              |
+-------------+-----------+-----------------+------------+--------------+
| Glucose     | 140       | mg/dL           |            |              |
+-------------+-----------+-----------------+------------+--------------+
| BUN         | 33        | mg/dL           |            |              |
+-------------+-----------+-----------------+------------+--------------+
| CREA        | 1.1       | mg/dL           |            |              |
+-------------+-----------+-----------------+------------+--------------+
| Estimated   | 48.0      | mL/min/1.73m2   |            |              |
| GFR         |           |                 |            |              |
+-------------+-----------+-----------------+------------+--------------+
| Calcium     | 9.8       | mg/dL           |            |              |
+-------------+-----------+-----------------+------------+--------------+
| AST         | 24        | 5 - 40 U/L      |            |              |
+-------------+-----------+-----------------+------------+--------------+
| ALT         | 16        | U/L             |            |              |
+-------------+-----------+-----------------+------------+--------------+
 
| Alkaline    | 74        | 38 - 110 U/L    |            |              |
| Phosphatase |           |                 |            |              |
+-------------+-----------+-----------------+------------+--------------+
| Bilirubin   | 0.5       | 0.1 - 1.5 mg/dL |            |              |
| Total       |           |                 |            |              |
+-------------+-----------+-----------------+------------+--------------+
| Albumin     | 3.9       | 3.3 - 4.8 g/dL  |            |              |
+-------------+-----------+-----------------+------------+--------------+
| Magnesium   | 1.7       | mg/dL           |            |              |
+-------------+-----------+-----------------+------------+--------------+
| Hemoglobin  | 8.1       |                 |            |              |
| A1c         |           |                 |            |              |
+-------------+-----------+-----------------+------------+--------------+
| WBC         | 3.1       | K/uL            |            |              |
+-------------+-----------+-----------------+------------+--------------+
| Hemoglobin  | 12.8      | 11.6 - 15.5     |            |              |
|             |           | g/dL            |            |              |
+-------------+-----------+-----------------+------------+--------------+
| Hematocrit  | 38.7      | 35.0 - 46.0 %   |            |              |
+-------------+-----------+-----------------+------------+--------------+
| MCV         | 100.5 (A) | 80.0 - 98.0 fL  |            |              |
+-------------+-----------+-----------------+------------+--------------+
| Platelet    | 177       | 150 - 400 K/uL  |            |              |
| Count       |           |                 |            |              |
+-------------+-----------+-----------------+------------+--------------+
| PTH Intact  | 208       | pg/mL           |            |              |
+-------------+-----------+-----------------+------------+--------------+
| Tacrolimus  | 7.3       |                 |            |              |
| Level       |           |                 |            |              |
+-------------+-----------+-----------------+------------+--------------+
 
 
 
+----------+
| Specimen |
+----------+
|          |
+----------+
 documented in this encounter
 
 Visit Diagnoses
 Not on filedocumented in this encounter

## 2020-01-08 NOTE — XMS
Encounter Summary
  Created on: 2020
 
 Viviana Ricci
 External Reference #: [id number]
 : 47
 Sex: Female
 
 Demographics
 
 
+-----------------------+----------------------+
| Address               | [address]      |
|                       | [place]  [address] |
+-----------------------+----------------------+
| Home Phone            | [phone]      |
+-----------------------+----------------------+
| Preferred Language    | Unknown              |
+-----------------------+----------------------+
| Marital Status        | Single               |
+-----------------------+----------------------+
| Christianity Affiliation | [id number]                 |
+-----------------------+----------------------+
| Race                  | Unknown              |
+-----------------------+----------------------+
| Ethnic Group          | Unknown              |
+-----------------------+----------------------+
 
 
 Author
 
 
+--------------+--------------------------------------------+
| Author       | [facility]  |
|              | and [facility]                                |
+--------------+--------------------------------------------+
| Organization | [facility]  |
|              | and [facility]                                |
+--------------+--------------------------------------------+
| Address      | Unknown                                    |
+--------------+--------------------------------------------+
| Phone        | Unavailable                                |
+--------------+--------------------------------------------+
 
 
 
 Support
 
 
+----------------+--------------+---------------------+-----------------+
| Name           | Relationship | Address             | Phone           |
+----------------+--------------+---------------------+-----------------+
| Ada/Ed Radhames | ECON         | [address]              | [phone] |
|                |              | [place]  |                 |
|                |              |  [address]              |                 |
+----------------+--------------+---------------------+-----------------+
 
 
 
 
 Care Team Providers
 
 
+------------------------+------+-----------------+
| Care Team Member Name  | Role | Phone           |
+------------------------+------+-----------------+
| Marzena Moser DO | PCP  | [phone] |
+------------------------+------+-----------------+
 
 
 
 Reason for Visit
 
 
+-------------+----------+
| Reason      | Comments |
+-------------+----------+
| Medication  |          |
| Management  |          |
+-------------+----------+
 
 
 
 Encounter Details
 
 
+--------+-----------+---------------------+----------------------+-------------+
| Date   | Type      | Department          | Care Team            | Description |
+--------+-----------+---------------------+----------------------+-------------+
| / | Telephone |   [facility]         |   ChengmaxxMarzena  | Medication  |
| 2018   |           | NEPHROLOGY  [address]   | M, DO  [address]      | Management  |
|        |           | [address]   | [address]      |             |
|        |           | [place], [place]     | [place], [place]      |             |
|        |           | [address]          | [address]  [phone]  |             |
|        |           | [phone]        |  [phone] (Fax)  |             |
+--------+-----------+---------------------+----------------------+-------------+
 
 
 
 Social History
 
 
+--------------+-------+-----------+--------+------+
| Tobacco Use  | Types | Packs/Day | Years  | Date |
|              |       |           | Used   |      |
+--------------+-------+-----------+--------+------+
| Never Smoker |       |           |        |      |
+--------------+-------+-----------+--------+------+
 
 
 
+---------------------+---+---+---+
| Smokeless Tobacco:  |   |   |   |
| Never Used          |   |   |   |
+---------------------+---+---+---+
 
 
 
+---------------------------------------------------------------+
 
| Comments: some second hand smoke exposure, but fairly minimal |
+---------------------------------------------------------------+
 
 
 
+-------------+-------------+---------+----------+
| Alcohol Use | Drinks/Week | oz/Week | Comments |
+-------------+-------------+---------+----------+
| No          |             |         |          |
+-------------+-------------+---------+----------+
 
 
 
+------------------+---------------+
| Sex Assigned at  | Date Recorded |
| Birth            |               |
+------------------+---------------+
| Not on file      |               |
+------------------+---------------+
 
 
 
+----------------+-------------+-------------+
| Job Start Date | Occupation  | Industry    |
+----------------+-------------+-------------+
| Not on file    | Not on file | Not on file |
+----------------+-------------+-------------+
 
 
 
+----------------+--------------+------------+
| Travel History | Travel Start | Travel End |
+----------------+--------------+------------+
 
 
 
+-------------------------------------+
| No recent travel history available. |
+-------------------------------------+
 documented as of this encounter
 
 Plan of Treatment
 
 
+--------+-----------+------------+----------------------+-------------------+
| Date   | Type      | Specialty  | Care Team            | Description       |
+--------+-----------+------------+----------------------+-------------------+
| / | Office    | Cardiology |   RakeshmaxxArlen fuentesa,    |                   |
|    | Visit     |            | ARNP  [address]   |                   |
|        |           |            | [place], [place]  |                   |
|        |           |            | [address]  [phone]  |                   |
|        |           |            |  [phone] (Fax)  |                   |
+--------+-----------+------------+----------------------+-------------------+
| / | Hospital  | Radiology  |   Popeye Townsend, |                   |
|    | Encounter |            |  MD  [address] |                   |
|        |           |            |  [place],   |                   |
|        |           |            | [place] [address]             |                   |
|        |           |            | [phone]         |                   |
|        |           |            | [phone] (Fax)   |                   |
+--------+-----------+------------+----------------------+-------------------+
 
| / | Surgery   | Radiology  |   Popeye Townsend, | CV EP PPM SYSTEM  |
|    |           |            |  MD  [address] | IMPLANT           |
|        |           |            |  St.  [place],   |                   |
|        |           |            | [place] [address]             |                   |
|        |           |            | [phone]         |                   |
|        |           |            | [phone] (Fax)   |                   |
+--------+-----------+------------+----------------------+-------------------+
| 02/10/ | Clinical  | Cardiology |                      |                   |
|    | Support   |            |                      |                   |
+--------+-----------+------------+----------------------+-------------------+
| / | Office    | Cardiology |   Katie Tonia,    |                   |
|    | Visit     |            | ARNP  [address]   |                   |
|        |           |            | [place]  |                   |
|        |           |            | [address]  [phone]  |                   |
|        |           |            |  [phone] (Fax)  |                   |
+--------+-----------+------------+----------------------+-------------------+
| / | Off-Site  | Nephrology |   Marzena Moser  |                   |
|    | Visit     |            | DO ETIENNE  [address] [place] [address]      |                   |
|        |           |            | [address]      |                   |
|        |           |            | [place]      |                   |
|        |           |            | [address]  [phone]  |                   |
|        |           |            |  [phone] (Fax)  |                   |
+--------+-----------+------------+----------------------+-------------------+
 documented as of this encounter
 
 Visit Diagnoses
 
 
+---------------------------------+
| Diagnosis                       |
+---------------------------------+
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
+---------------------------------+
 documented in this encounter

## 2020-01-08 NOTE — XMS
Encounter Summary
  Created on: 2020
 
 Viviana Ricci
 External Reference #: [id number]
 : 47
 Sex: Female
 
 Demographics
 
 
+-----------------------+----------------------+
| Address               | [address]      |
|                       | [place]  [address] |
+-----------------------+----------------------+
| Home Phone            | [phone]      |
+-----------------------+----------------------+
| Preferred Language    | Unknown              |
+-----------------------+----------------------+
| Marital Status        | Single               |
+-----------------------+----------------------+
| Mu-ism Affiliation | [id number]                 |
+-----------------------+----------------------+
| Race                  | Unknown              |
+-----------------------+----------------------+
| Ethnic Group          | Unknown              |
+-----------------------+----------------------+
 
 
 Author
 
 
+--------------+--------------------------------------------+
| Author       | [facility]  |
|              | and [facility]                                |
+--------------+--------------------------------------------+
| Organization | [facility]  |
|              | and [facility]                                |
+--------------+--------------------------------------------+
| Address      | Unknown                                    |
+--------------+--------------------------------------------+
| Phone        | Unavailable                                |
+--------------+--------------------------------------------+
 
 
 
 Support
 
 
+----------------+--------------+---------------------+-----------------+
| Name           | Relationship | Address             | Phone           |
+----------------+--------------+---------------------+-----------------+
| Ada/Ed Radhames | ECON         | [address]              | [phone] |
|                |              | [place]  |                 |
|                |              |  [id number]              |                 |
+----------------+--------------+---------------------+-----------------+
 
 
 
 
 Care Team Providers
 
 
+------------------------+------+-----------------+
| Care Team Member Name  | Role | Phone           |
+------------------------+------+-----------------+
| Marzena Msoer PCP  | [phone] |
+------------------------+------+-----------------+
 
 
 
 Reason for Referral
 Diagnostic/Screening (Routine)
 
+--------+--------+-----------+--------------+--------------+---------------+
| Status | Reason | Specialty | Diagnoses /  | Referred By  | Referred To   |
|        |        |           | Procedures   | Contact      | Contact       |
+--------+--------+-----------+--------------+--------------+---------------+
| Closed |        | Radiology |   Diagnoses  |   [place],  |   [facility]    |
|        |        |           |  Coronary    | Tonia, ARNP   | [address]  |
|        |        |           | artery       | [address] |  [place], |
|        |        |           | disease      |  St  [place]   |  [place]           |
|        |        |           | involving    | [place]    | [address]    |
|        |        |           | native       | [address]        | Phone:        |
|        |        |           | coronary     | Phone:       | [phone]  |
|        |        |           | artery of    | [phone] |  Fax:         |
|        |        |           | native heart |   Fax:       | [phone]  |
|        |        |           |  without     | [phone] |               |
|        |        |           | angina       |              |               |
|        |        |           | pectoris     |              |               |
|        |        |           | Hypertension |              |               |
|        |        |           | , essential  |              |               |
|        |        |           |  Mixed       |              |               |
|        |        |           | hyperlipidem |              |               |
|        |        |           | ia  PVC      |              |               |
|        |        |           | (premature   |              |               |
|        |        |           | ventricular  |              |               |
|        |        |           | contraction) |              |               |
|        |        |           |   Procedures |              |               |
|        |        |           |   ECHO       |              |               |
|        |        |           | Complete     |              |               |
+--------+--------+-----------+--------------+--------------+---------------+
 
 
 Diagnostic/Screening (Routine)
 
+--------+--------+-----------+--------------+--------------+---------------+
| Status | Reason | Specialty | Diagnoses /  | Referred By  | Referred To   |
|        |        |           | Procedures   | Contact      | Contact       |
+--------+--------+-----------+--------------+--------------+---------------+
| Closed |        | Radiology |   Diagnoses  |   [facility],  |   [facility] |
|        |        |           |  Coronary    | [facility]   |  Medicine     |
|        |        |           | artery       | [address] | [address]  |
|        |        |           | disease      |  St  [place]   |  [place], |
|        |        |           | involving    | [place]    |  [place]           |
|        |        |           | native       | [address]        | [address]    |
|        |        |           | coronary     | Phone:       | Phone:        |
|        |        |           | artery of    | [phone] | [phone]  |
|        |        |           | native heart |   Fax:       |  Fax:         |
 
|        |        |           |  without     | [phone] | [phone]  |
|        |        |           | angina       |              |               |
|        |        |           | pectoris     |              |               |
|        |        |           | Hypertension |              |               |
|        |        |           | , essential  |              |               |
|        |        |           |  Mixed       |              |               |
|        |        |           | hyperlipidem |              |               |
|        |        |           | ia           |              |               |
|        |        |           | Procedures   |              |               |
|        |        |           | NM Nuclear   |              |               |
|        |        |           | Stress Test  |              |               |
|        |        |           | (Vasodilator |              |               |
|        |        |           | )            |              |               |
+--------+--------+-----------+--------------+--------------+---------------+
 
 
 
 
 Reason for Visit
 
 
+------------------+----------+
| Reason           | Comments |
+------------------+----------+
| Follow-up        |          |
+------------------+----------+
| Coronary Artery  |          |
| Disease          |          |
+------------------+----------+
| Hypertension     |          |
+------------------+----------+
| Hyperlipidemia   |          |
+------------------+----------+
 
 
 
 Encounter Details
 
 
+--------+---------+----------------------+----------------------+----------------------+
| Date   | Type    | Department           | Care Team            | Description          |
+--------+---------+----------------------+----------------------+----------------------+
| 10/10/ | Office  |   [facility]          |   Tonia Wilson,    | Coronary artery      |
| 2019   | Visit   | CARDIOLOGY  [address]    | ARNP  [address]   | disease involving    |
|        |         | [address]  [place], | St  [place]  | native coronary      |
|        |         |  [place] [address]       | [address]  [phone]  | artery of native     |
|        |         | [phone]         |  [phone] (Fax)  | heart without angina |
|        |         |                      |                      |  pectoris (Primary   |
|        |         |                      |                      | Dx); Hypertension,   |
|        |         |                      |                      | essential; Mixed     |
|        |         |                      |                      | hyperlipidemia; PVC  |
|        |         |                      |                      | (premature           |
|        |         |                      |                      | ventricular          |
|        |         |                      |                      | contraction)         |
+--------+---------+----------------------+----------------------+----------------------+
 
 
 
 Social History
 
 
 
+--------------+-------+-----------+--------+------+
| Tobacco Use  | Types | Packs/Day | Years  | Date |
|              |       |           | Used   |      |
+--------------+-------+-----------+--------+------+
| Never Smoker |       |           |        |      |
+--------------+-------+-----------+--------+------+
 
 
 
+---------------------+---+---+---+
| Smokeless Tobacco:  |   |   |   |
| Never Used          |   |   |   |
+---------------------+---+---+---+
 
 
 
+---------------------------------------------------------------+
| Comments: some second hand smoke exposure, but fairly minimal |
+---------------------------------------------------------------+
 
 
 
+-------------+-------------+---------+----------+
| Alcohol Use | Drinks/Week | oz/Week | Comments |
+-------------+-------------+---------+----------+
| No          |             |         |          |
+-------------+-------------+---------+----------+
 
 
 
+------------------+---------------+
| Sex Assigned at  | Date Recorded |
| Birth            |               |
+------------------+---------------+
| Not on file      |               |
+------------------+---------------+
 
 
 
+----------------+-------------+-------------+
| Job Start Date | Occupation  | Industry    |
+----------------+-------------+-------------+
| Not on file    | Not on file | Not on file |
+----------------+-------------+-------------+
 
 
 
+----------------+--------------+------------+
| Travel History | Travel Start | Travel End |
+----------------+--------------+------------+
 
 
 
+-------------------------------------+
| No recent travel history available. |
+-------------------------------------+
 documented as of this encounter
 
 Last Filed Vital Signs
 
 
 
+-------------------+-------------------+----------------------+----------+
| Vital Sign        | Reading           | Time Taken           | Comments |
+-------------------+-------------------+----------------------+----------+
| Blood Pressure    | 100/70            | 10/10/2019  1:25 PM  |          |
|                   |                   | PDT                  |          |
+-------------------+-------------------+----------------------+----------+
| Pulse             | 68                | 10/10/2019  1:25 PM  |          |
|                   |                   | PDT                  |          |
+-------------------+-------------------+----------------------+----------+
| Temperature       | -                 | -                    |          |
+-------------------+-------------------+----------------------+----------+
| Respiratory Rate  | 18                | 10/10/2019  1:25 PM  |          |
|                   |                   | PDT                  |          |
+-------------------+-------------------+----------------------+----------+
| Oxygen Saturation | -                 | -                    |          |
+-------------------+-------------------+----------------------+----------+
| Inhaled Oxygen    | -                 | -                    |          |
| Concentration     |                   |                      |          |
+-------------------+-------------------+----------------------+----------+
| Weight            | 119.2 kg (262 lb  | 10/10/2019  1:25 PM  |          |
|                   | 12.6 oz)          | PDT                  |          |
+-------------------+-------------------+----------------------+----------+
| Height            | 167.6 cm (5' 6")  | 10/10/2019  1:25 PM  |          |
|                   |                   | PDT                  |          |
+-------------------+-------------------+----------------------+----------+
| Body Mass Index   | 42.42             | 10/10/2019  1:25 PM  |          |
|                   |                   | PDT                  |          |
+-------------------+-------------------+----------------------+----------+
 documented in this encounter
 
 Patient Instructions
 Patient Instructions Stacie Valencia, Medical Assistant - 10/10/2019  1:30 PM PDTYou hakeem
l start taking Eliquis 5 mg by mouth twice daily for stroke risk. 
 
 Mobile cardiac telemetry: 2 weeks
 
  Date:____________________________________
 
  Check-In Time:____________________________
 
  Where to Check In:_________________________
 
 Echo:  
  Date:____________________________________
 
  Check-In Time:____________________________
 
  Where to Check In:_________________________
 
 Persantine/Lexiscan Myoview
 
 Date: _______________________________________________ 
 
 Check-in Time: _______________________________________
 
 Where to Check In: _______________________________________
 
 Instructions
 
 
 1. Nothing to eat or drink anything 6 hours prior to Persantine/Lexiscan
 2.  DO NOT drink caffeine 12 hours prior to the test.
 3.  DO NOT take any Metoprolol the night before or the morning of the test.
 4.  You can take all other medications the morning of the test with a small sip of water.
 5.  Please bring a list of your current medications with you.
 
 Resting Portion of test:
 
 Date: _________________________________________________
 
 Check-in Time: _________________________________________
 
 Where to Check In: _________________________________________
 
 Referral sent for cardiac rehab at [facility]
 
 Follow up appointment: 4 weeks test results
 
  Provider: Abebe Townsend MD
 
  Date:___________________________________________________
 
  Check-In Time:___________________________________________
 
 Electronically signed by Lauren Marie RN at 10/10/2019  3:01 PM PDT
 documented in this encounter
 
 Progress Notes
 Tonia Wilson ARNP - 10/10/2019  1:30 PM PDTFormatting of this note might be different fr
om the original.
    
 
 PATIENT NAME:  Viviana Ricci  
 : 1945:         AGE: 73 y.o.
  PRIMARY CARE:
 Marzena Moser DO
 CC:    
 
 OUTPATIENT FOLLOW UP VISIT
 
 Date of Service: 10/10/2019 
 
 HISTORY OF PRESENT ILLNESS:
 Viviana Ricci is a 73 y.o. female with a history of coronary artery disease post CABG x 3 
in , history of diabetes, renal failure post a renal transplantation, morbid obesity, in
activity, hypertension, hyperlipidemia, pulmonary hypertension and severe arthritis. She is 
being seen today for follow up coronary artery disease.
 
 She was last seen 18 at which time, she would have labs done for BMP, magnesium, TSH, 
free T4, and vitamin D. She would be sent a letter or called with the results. She would oth
erwise continue with her current medical regimen.She would follow up in 1 year for office vi
sit, or sooner with concerns. She would have an ECG at her follow up visit and she would hav
e fasting labs prior to visit for lipid profile, CMP and CBC, if not done prior by another carolina quinn. Since that time, she reports she has felt about the same.
 
 She saw her PCP on 19 for an office visit for follow up.  She stated that she had drast
lindally changed in her eating habits to address her weight problem. She was now referred to i
t as an "unhealthy addiction". She looked into going to PhD counselor, but it was to expensi
ve. She stated that she had given up "pop, candy, chocolate, cake, baked things with white f
 
lour and white sugar." As a result, she stated that she was eating one meal per day. She adm
its that it had diminished her by mouth fluid intake somewhat. She would increase her Porgra
f to 1.5 mg, AM and 1 mg PM. She would recheck the Prograf level and Urine Pro/Cr ration in 
one week. She would follow up in 4 months. 
 
 She has had a fair energy level. When it is cold she has less energy. She has not been very
 active. She enjoys computer, talking on the phone in her spare time. She has not had any ch
est pain or discomfort at rest or with exertion.  She notes that even prior to her open hear
t surgery she did not have any chest discomfort, only shortness of breath.  She has been not
icing increased shortness of breath recently.  She attributed it to the change in weather.  
She feels that she is not able to walk up into her Jew without stopping to rest to catch 
her breath. This is similar to her symptoms prior to bypass. She has not had any lightheaded
ness or dizziness. She has not noticed palpitations. She has noticed mild swelling at ankles
 by the end of the day that is resolved in the morning, which has been stable for her. She s
leeps on 1 pillow at night without any shortness of breath.  She sleeps with CPAP machine in
 place nightly.  She wears compression socks.
 
 MEDICAL, SURGICAL, AND PERSONAL HISTORY
 Past Medical, Surgical, Family, and Social History are reviewed in EPIC.
 
 CURRENT PROBLEMS
 Patient Active Problem List 
 Diagnosis 
   Chronic low back pain 
   Hypothyroidism 
   Mixed hyperlipidemia 
   Complications of transplanted kidney 
   Movement disorder 
   Diabetes mellitus type II, uncontrolled  
   Pulmonary hypertension  
   Coronary artery disease involving native coronary artery of native heart without angina
 pectoris 
   JACK on CPAP 
   Obesity hypoventilation syndrome  
   Kidney replaced by transplant 
   Secondary hyperparathyroidism  
   Dyspnea 
   FSGS (focal segmental glomerulosclerosis) 
   Murmur 
   Cardiomegaly 
   Hypertension, essential 
   PLMD (periodic limb movement disorder) 
   Chest pain 
   UTI (lower urinary tract infection) 
   Renal transplant recipient 
   Thyroid activity decreased 
   Type 2 DM with CKD stage 2 and hypertension  
 
 CURRENT MEDICATIONS
 Current Outpatient Medications 
 Medication Sig Dispense Refill 
   allopurinol (ZYLOPRIM) 100 mg tablet take 1 tablet by mouth once daily 30 tablet 11 
   aspirin 81 mg EC tablet Take 81 mg by mouth Daily.   
   B-D INS SYRINGE 0.5CC/31GX5/16 31G X 5/16" 0.5 ML MISC USE BEFORE MEALS AS DIRECTED 1 e
ach 11 
   cholecalciferol (VITAMIN D-3) 2000 UNITS TABS Take 5,000 Units by mouth Every other day
.   
   cinacalcet (SENSIPAR) 30 mg tablet take 1 tablet by mouth once daily 90 tablet 3 
   cyclobenzaprine (FLEXERIL) 10 mg tablet Take 1 tablet by mouth Twice  daily as needed f
or Muscle spasms. 45 tablet 0 
 
   fludrocortisone (FLORINEF) 0.1 mg tablet Take 1 tablet by mouth Every other day.   
   fludrocortisone (FLORINEF) 0.1 mg tablet Take 1 tablet by mouth Every other day. 45 tab
let 3 
   furosemide (LASIX) 40 mg tablet Take 1 tablet by mouth Daily as needed. 30 tablet 11 
   glucose blood VI test strips (ONE TOUCH ULTRA TEST) strip Check blood sugar before each
 meal and as directed 100 each 12 
   insulin glargine (LANTUS) 100 units/mL injection (vial) inject 20 units subcutaneously 
every morning 10 vial 11 
   insulin lispro (HUMALOG) 100 units/mL injection (vial) inject subcutaneously BEFORE CHIKA
LS ACCORDING TO SLIDING SCALE Max dose 20 units daily 10 vial 11 
   levothyroxine (SYNTHROID) 50 mcg tablet take 1 tablet by mouth once daily 30 tablet 11 
   lisinopril (PRINIVIL,ZESTRIL) 30 MG tablet take 1 tablet by mouth once daily 90 tablet 
3 
   loperamide (ANTI-DIARRHEAL) 2 mg capsule Take 1 capsule by mouth 4 times daily as neede
d. 60 capsule 5 
   losartan (COZAAR) 50 mg tablet take 1 tablet by mouth once daily 90 tablet 3 
   magnesium oxide (MAG-OX) 400 mg tablet take 1 tablet by mouth twice a day 60 tablet 11 
   metoprolol tartrate (LOPRESSOR) 25 mg tablet take 1 tablet by mouth twice a day 60 tabl
et 11 
   mycophenolate (CELLCEPT) 250 mg capsule Take 1 capsule by mouth 2 times daily. 60 capsu
le 11 
   predniSONE (DELTASONE) 5 mg tablet take 1 tablet by mouth once daily 90 tablet 3 
   Prenatal Vit-Fe Fumarate-FA (PNV PRENATAL PLUS MULTIVITAMIN) 27-1 MG TABS take 1 tablet
 by mouth once daily. 30 tablet 11 
   QVAR REDIHALER 80 MCG/ACT inhaler    
   Respiratory Therapy Supplies MISC Continue nocturnal O2 at 2 l/m through CPAP for lifet
bart. Dx: JACK G47.33 Please provide new nasal mask for CPAP and any other needed replacement 
supplies. 1 each 0 
   Respiratory Therapy Supplies MISC Decrease CPAP to 12-18 cmH2O Diagnosis Code(s)327.23.
 Please send order to [facility]. 1 each 0 
   rosuvastatin (CRESTOR) 20 mg tablet take 1 tablet by mouth NIGHTLY 30 tablet 11 
   tacrolimus (PROGRAF) 1 mg capsule Take 1 capsule by mouth 2 times daily. For a total do
se of 1 mg, by mouth, 2 times daily. 240 capsule  
 
 No current facility-administered medications for this visit.  
  
 
 ALLERGIES
 No Known Allergies
 
 ROS
 Review of Systems 
 Constitutional: Negative for chills, fever and malaise/fatigue. 
 HENT: Negative for hearing loss, nosebleeds and tinnitus.  
 Eyes: Negative for blurred vision and double vision. 
 Respiratory: Negative for shortness of breath.  
 Cardiovascular: Negative for chest pain, palpitations and leg swelling. 
 Gastrointestinal: Negative for abdominal pain, blood in stool, constipation, diarrhea, hear
tburn, nausea and vomiting. 
 Genitourinary: Negative for frequency, hematuria and urgency. 
 Musculoskeletal: Positive for back pain, joint pain, myalgias and neck pain. Negative for f
alls. 
 Skin: Negative for itching and rash. 
 Neurological: Negative for dizziness, tingling, tremors, seizures, loss of consciousness an
d weakness. 
      Lightheadedness -- No 
 Endo/Heme/Allergies: Bruises/bleeds easily. 
 Psychiatric/Behavioral: Negative for memory loss. The patient is not nervous/anxious and do
es not have insomnia.  
  
 
 
 OBJECTIVE:  
 
 PHYSICAL EXAM
 /70  | Pulse 68  | Resp 18  | Ht 1.676 m (5' 6")  | Wt 119.2 kg (262 lb 12.6 oz)  | B
MI 42.42 kg/m 
 Physical Exam 
 Constitutional: She is oriented to person, place, and time. She appears well-developed and 
well-nourished. 
 Elderly female in no acute distress, arrived alone 
 Neck: Normal carotid pulses and no JVD present. Carotid bruit is not present. 
 Cardiovascular: Normal rate, regular rhythm, S1 normal, S2 normal and intact distal pulses.
 Frequent extrasystoles are present. PMI is not displaced. Exam reveals distant heart sounds
 (due to girth). Exam reveals no gallop and no friction rub. 
 Murmur heard.
  Holosystolic murmur is present with a grade of 1/6.
 Pulses:
      Carotid pulses are 2+ on the right side, and 2+ on the left side.
      Posterior tibial pulses are 1+ on the right side, and 1+ on the left side. 
 Pulmonary/Chest: Effort normal and breath sounds normal. No accessory muscle usage. No resp
iratory distress. She has no wheezes. She has no rhonchi. She has no rales. 
 Abdominal: Soft. Normal appearance and normal aorta. She exhibits no abdominal bruit. There
 is no hepatosplenomegaly (difficult to fully evaluate due to body habitus). There is no ten
derness. 
 Musculoskeletal: She exhibits edema (trace at right ankle, 1+ on left ankle). 
 Neurological: She is alert and oriented to person, place, and time. Gait (using front wheel
ed walker) abnormal. 
 Skin: Skin is warm and dry. No cyanosis. Nails show no clubbing. 
 Psychiatric: She has a normal mood and affect. Her mood appears not anxious. She does not e
xhibit a depressed mood. 
 Vitals reviewed.
 
 ECG: I personally independently reviewed ECG tracing during this visit (interpreted and cherise
led by another provider):
 Results for orders placed or performed in visit on 18 
 ECG 12 lead 
 Result Value Ref Range 
  INTERPRETATION TEXT   
   Sinus rhythm with 1st degree AV block with frequent premature ventricular complexes
 Left axis deviation
 Pulmonary disease pattern
 Nonspecific ST abnormality
 Abnormal ECG
 When compared with ECG of 2017 14:19,
 premature ventricular complexes are now present
 Confirmed by ERIC REYES, ABEBE ([id number]) on 2018 3:58:44 PM
  
   Which is compared to today's ECG 10/10/2019: Possible atrial fibrillation versus junction
al rhythm with frequent unifocal PVCs, rate of 68 beats per minute. Reviewed with Dr. Leyda mcdaniel.
 
 LAB RESULTS reviewed during visit today primarily from [facility]: 
 LIPID
 Lab Results 
 Component Value Date 
  CHOL 162 2013 
  TRIG 225 2013 
  HDL 45 2013 
  LDL 72 2013 
  LDLEX 51 2019 
 
  HDLEX 51 2019 
  TRIGEX 115 2019 
  CHOLEX 125 2019 
 
 CHEMISTRY
 Lab Results 
 Component Value Date 
   (H) 2018 
  GLUEX 89 2019 
   2018 
  NAEX 144 2019 
  K 5.3 (H) 2018 
  KEX 5.1 2019 
   (H) 2018 
  CLEX 110 2019 
  CO2 21 (L) 2018 
  CO2EX 22 2019 
  CALCIUM 9.8 2018 
  ALKPHOS 100 2014 
  AST 20 07/10/2013 
  ASTEX 46 2019 
  ALT 14 07/10/2013 
  ALTEX 37 2019 
  BILITOT 0.6 2014 
  CREA 1.48 (H) 2018 
  BUN 43 (H) 2018 
  EGFR 31.0 07/10/2013 
  EGFREX 35 2019 
  CREEX 1.47 2019 
 
 HEMATOLOGY
 Lab Results 
 Component Value Date 
  WBC 4.6 07/10/2013 
  WBCEX 5.6 2019 
  HGB 11.9 07/10/2013 
  HGBEX 13.9 2019 
  HCT 35.7 07/10/2013 
  HCTEX 42.6 2019 
   (A) 07/10/2013 
  PLTEX 176 2019 
  
 Lab Results 
 Component Value Date 
  TSH 1.26 2018 
  TSHEX 1.85 2019 
   (H) 2013 
   (H) 2013 
 
 I reviewed records from PCP for office visit on 19 which is summarized in the HPI. 
 
 Above data and testing is reviewed this visit; testing below is historical data unless othe
rwise specified.
 ASSESSMENT:  
 
 1. Coronary artery disease:
 A.  Status post CABG x 3 in  with LIMA to the LAD, SVG to the OM1 and OM2.
             B.  A persantine sestamibi stress test on 07 revealed a medium sized, mod
erate in severity fixed defect of the anterior wall, and a small sized mild in severity fixe
d defect of the inferior wall, LVEF by gated SPECT was 66%.
 
             C.  Bilateral heart catheterization on 05/03/10, shows severe two vessel corona
ry artery disease, a chronic occlusion through the proximal portion of the left anterior jed
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
rtery, grafts: open left internal mammary artery graft to the mid left anterior descending a
rtery, open saphenous vein grafts to the OM1 and OM2, mildly dilated left ventricular cavity
 with a normal left systolic function, LVEF 70%, mild to moderate pulmonary hypertension and
 a normal cardiac output, coronary circulation is right dominant, normal system pressure.
             D.  Persantine nuclear medicine stress test 14 shows a normal myocardial p
erfusion imaging study with normal left ventricular size, wall thickness, LVEF by gated SPEC
T of 78%.  
             E. Today, 10/10/2019, she is symptomatic with shortness of breath and fatigue t
hat is worsened recently. She is on a medical regimen with dual antiplatelet therapy, aspiri
n, ACE-I, beta-blocker and statin. 
  There are no signs or symptoms of overt congestive heart failure, and her physical exam sh
ows no significant fluid retention.  She is in class III- Symptoms with minimal exertion of 
the New [employer] Association functional class. Heart failure stage A-pre-heart failure. Brien ponce use the furosemide only when she need it. She takes it approximately every 10 days.  
  The ECG showed some changes with frequent PVCs, and based on this it appears to be occurri
ng approximately 40% of the time. With this as well as her increased fatigue and shortness o
f breath, which was her anginal equivalent prior to her CABG, further evaluation with Persan
darrel nuclear medicine stress test is warranted.
  She has a history of cardiac issues including CAD. She is unable to walk on a treadmill du
e to osteoarthritis. The nuclear medicine stress test is preferred for her over a non-nuclea
r straight treadmill stress test due to as she is unable to walk on a treadmill due to osteo
arthritis. She does have a diagnosis of coronary artery disease. She has had an abnormal ECG
 in the past 30 days. She has symptoms of atypical angina. She has risk factors including:ob
esity, abnormal lipid profile, diabetes and hypertension. She does not have a family history
 of premature coronary artery disease. She is considered to be high risk.  
 
 2. Possible Atrial fibrillation- will presume while further evaluating: 
  A. Her ECG shows undetermined arrhythmia that appears to be either atrial fibrillation or 
junctional rhythm with frequent unifocal PVCs.  
  Her risks for stroke include: Hx of HTN (1), Diabetes (1), Vascular disease (1), Age 65 to
 75 (1) and Female Gender (1). Her [id number]-VUWp score is 5, which gives an estimated 6.7% ri
sk of stroke per year in atrial fibrillation.  Her bleeding risks include: >65 YO (1)  and N
SAIDS/ASA (1).  Her HASBLED score is 2-3, which confers an intermediate risk (4.1-5.8%)  ris
k of bleed per year. We will treat her as if she has atrial fibrillation for stroke preventi
on, and we will start her on Eliquis 5 mg twice daily. She will have mobile cardiac telemetr
y for 14 days to evaluate for atrial fibrillation and PVC burden.  She was given 6 weeks Laura
mercy 5 mg samples. Will not send in prescription until it is confirmed on her further evalua
tion.
  Her rate is well controlled, so at this time no medication changes are made.
   
 3. Unifocal PVC's:
  A. Today, 10/10/2019, on the ECG shows more PVCs in office. Estimated burden would be 40%,
 which would be very concerning. She will be scheduled for stress test and mobile cardiac te
lemetry to evaluate etiology as well as burden.
 
 4. Right sided heart failure/ cor pulmonale / severe pulmonary hypertension:
             A. The echocardiogram from 02/19/10 revealed moderate bi-atrial dilatation and 
normal left ventricular size, wall thickness and motion, LVEF is 55-60%, dilated right ventr
icle with moderate hypo-kinesis, moderate tricuspid valve regurgitation, severe pulmonary hy
pertension with a peak systolic pressure of 80 mmHg, and a small pericardial effusion. 
             B. Echocardiogram from 4/15/14 showed Mild left atrial dilatation. Normal left 
ventricular size, wall thickness and motion. Preserved  left ventricular systolic function. 
LVEF is 70-75%. Grade 1 left ventricular diastole dysfunction. Mild tricuspid valve regurgit
ation. Mild thickened trileaflet aortic valve with adequate opening. A mild aortic valve ins
ufficiency. Normal right-sided pressure.
             C. Echocardiogram 18 showing, moderate left atrial dilatation. Normal lef
t ventricular size with a mild concentric left ventricular hypertrophy.  Left ventricular sy
 
stolic dysfunction is preserved.  LVEF is 60-65%. Mild aortic root dilatation measuring 3.9 
cm in diameter, mildly thickened and calcified trileaflet aortic valve with adequate opening
. There is aortic valve sclerosis without significant aortic valve stenosis. Mildly thickene
d and calcified mitral valve with a mild mitral valve regurgitation, mild mitral annular claudia
cification. Normal right-sided pressure. Normal IVC with normal respiratory collapse. When c
ompared to echocardiography on 4/15/14, no significant changes.
             D. Today, 10/10/2019, she is actually well controlled with her intermittent diu
retic dose. Will recheck echocardiogram.  
  
 5. Hypertension, essential:
             A. Today, 10/10/2019, her blood pressure is well controlled in office.     
  
 6.  Hyperlipidemia, mixed.
             A. Today, 10/10/2019, she remains on rosuvastatin.   
  
 7.  Obstructive sleep apnea:
             A. Today, 10/10/2019, she use her CPAP nightly. 
  
 8.  Morbid obesity.
             A. Today, 10/10/2019, Body mass index is 42.42 kg/m.. This is stable since he
r last visit.  
  
 9. Type II diabetes. Not otherwise addressed today 10/10/2019. 
  
 10. Chronic kidney disease: Not otherwise addressed today 10/10/2019.  
             A.  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 Followed by Dr. Moser.
  
 11.  DVT left leg 2015: Not otherwise addressed today 10/10/2019.  
             A.  She took a one year course of apixaban, now on aspirin alone.
  
 12.  Hypothyroidism. She hasn't had lab for this for a couple years. Not otherwise addresse
d today 10/10/2019.  
 
 PLAN:  
 
 1. She would scheduled for an mobile cardiac telemetry for 14 days for PVC's and potential 
atrial fibrillation. 
 2. She will be scheduled for a persantine nuclear medicine stress test for coronary artery 
disease. 
 3. She will be scheduled for an echocardiogram for her pulmonary hypertension. 
 4. She will be referred to [facility] in [place] for coronary artery disease. 
 5. She will start taking Eliquis 5 mg by mouth twice daily for stroke risk. 
 6. She will follow up in 4 weeks  for office visit, or sooner with concerns.  
 
 I spent 45 minutes face to face with the patient, with over 50% spent in counseling and/or 
coordination of care regarding potential atrial fibrillation, stroke risk, PVCs and risk of 
high percent burden. 
 Electronically signed by: 
 BELKIS Arredondo on 10/10/2019 
 
 Stacie MENDEZ Medical Assistant am acting as a scribe on behalf of, and in the pres
ence of BELKIS Arredondo. - Karsten Leahy 10/10/2019 13:54  
 I, BELKIS Arredondo, personally performed the services described in this documentation, 
as scribed in my presence and it is both accurate and complete. BELKIS Arredondo 10/10/20
19  
 Portions of this chart may have been created with Dragon voice recognition software. Occasi
onal wrong-word or   sound-alike  substitutions may have occurred due to the inherent naqvi
itations of voice recognition software. Please read the chart carefully and recognize, using
 context, where these substitutions have occurred.   Electronically signed by BELKIS Arredondo at 10/11/2019  5:33 PM PDTdocumented in this encounter
 
 
 Plan of Treatment
 
 
+--------+-----------+------------+----------------------+-------------------+
| Date   | Type      | Specialty  | Care Team            | Description       |
+--------+-----------+------------+----------------------+-------------------+
| / | Office    | Cardiology |   Tonia Wilson,    |                   |
|    | Visit     |            | ARNP  [address]   |                   |
|        |           |            | [place]  |                   |
|        |           |            | [id number]  [phone]  |                   |
|        |           |            |  [phone] (Fax)  |                   |
+--------+-----------+------------+----------------------+-------------------+
| / | Hospital  | Radiology  |   Abebe Townsend, |                   |
|    | Encounter |            |  MD  [address] |                   |
|        |           |            |  [place],   |                   |
|        |           |            | [place] [address]             |                   |
|        |           |            | [phone]         |                   |
|        |           |            | [phone] (Fax)   |                   |
+--------+-----------+------------+----------------------+-------------------+
| / | Surgery   | Radiology  |   Abebe Townsend, | CV EP PPM SYSTEM  |
|    |           |            |  MD  [address] | IMPLANT           |
|        |           |            |  St.  [place],   |                   |
|        |           |            | [place] [address]             |                   |
|        |           |            | [phone]         |                   |
|        |           |            | [phone] (Fax)   |                   |
+--------+-----------+------------+----------------------+-------------------+
| 02/10/ | Clinical  | Cardiology |                      |                   |
|    | Support   |            |                      |                   |
+--------+-----------+------------+----------------------+-------------------+
| / | Office    | Cardiology |   Tonia Wilson,    |                   |
|    | Visit     |            | BELKIS  [address]   |                   |
|        |           |            |   [place]  |                   |
|        |           |            | [address]  [phone]  |                   |
|        |           |            |  [phone] (Fax)  |                   |
+--------+-----------+------------+----------------------+-------------------+
| / | Off-Site  | Nephrology |   Marzena Moser  |                   |
|    | Visit     |            | DO ETIENNE  [address]      |                   |
|        |           |            | [address]      |                   |
|        |           |            | [place]      |                   |
|        |           |            | [address]  [phone]  |                   |
|        |           |            |  [phone] (Fax)  |                   |
+--------+-----------+------------+----------------------+-------------------+
 documented as of this encounter
 
 Procedures
 
 
+----------------+--------+-------------+----------------------+----------------------+
| Procedure Name | Priori | Date/Time   | Associated Diagnosis | Comments             |
|                | ty     |             |                      |                      |
+----------------+--------+-------------+----------------------+----------------------+
| ECG 12 LEAD    | Routin | 10/10/2019  |   Hypertension,      |   Results for this   |
|                | e      |  1:55 PM    | essential            | procedure are in the |
|                |        | PDT         |                      |  results section.    |
+----------------+--------+-------------+----------------------+----------------------+
 documented in this encounter
 
 Results
 Mobile Cardiac Telemetry (2019  1:58 PM PST)
 
 
+------------------------------------------------------------------------+--------------+
| Narrative                                                              | Performed At |
+------------------------------------------------------------------------+--------------+
|   Abebe Townsend MD       2019 14:04  Mobile cardiac telemetry |   [facility]  |
|  from 10/16 until 10/30/2019.  Baseline EKG show atrial fibrillation   |              |
| with heart rate ranging   from 33 to 149 bpm.  PVCs, ventricular       |              |
| couplets, 4 beat run nonsustained ventricular   tachycardia was noted. |              |
|      Episodes of rapid ventricular response with heart rate of149 bpm  |              |
|   was noted.  Pauses up to 3.8-second were present.  Findings suggest  |              |
| potential tacky/bradycardia syndrome.                                  |              |
+------------------------------------------------------------------------+--------------+
 
 
 
+--------------+---------+--------------------+--------------+
| Performing   | Address | City/State/Zipcode | Phone Number |
| Organization |         |                    |              |
+--------------+---------+--------------------+--------------+
|   [facility]  |         |                    |              |
+--------------+---------+--------------------+--------------+
 ECHO Complete (10/30/2019  5:07 PM PDT)
 
+-------------+---------+-----------+-------------+--------------+
| Component   | Value   | Ref Range | Performed   | Pathologist  |
|             |         |           | At          | Signature    |
+-------------+---------+-----------+-------------+--------------+
| Patient     | 262 lbs |           | [facility] |              |
| Weight      |         |           |             |              |
| (lbs)       |         |           |             |              |
+-------------+---------+-----------+-------------+--------------+
| Patient     | 5'6     |           | [facility] |              |
| Height      |         |           |             |              |
+-------------+---------+-----------+-------------+--------------+
| LVIDd       | 4.13    | cm        | [facility] |              |
+-------------+---------+-----------+-------------+--------------+
| FS          | 28      | %         | [facility] |              |
+-------------+---------+-----------+-------------+--------------+
| LA volume   | 94.38   | mL        | [facility] |              |
+-------------+---------+-----------+-------------+--------------+
| Ascending   | 3.33    | cm        | [facility] |              |
| aorta       |         |           |             |              |
+-------------+---------+-----------+-------------+--------------+
| Aortic arch | 2.24    | cm        | [facility] |              |
+-------------+---------+-----------+-------------+--------------+
| IVRT        | 107.27  | msec      | [facility] |              |
+-------------+---------+-----------+-------------+--------------+
| LVOT peak   | 77.14   | cm/s      | [facility] |              |
| travon         |         |           |             |              |
+-------------+---------+-----------+-------------+--------------+
| AV peak travon | 139.85  | cm/s      | [facility] |              |
+-------------+---------+-----------+-------------+--------------+
| AV peak     | 7.82    | mmHg      | [facility] |              |
| gradient    |         |           |             |              |
+-------------+---------+-----------+-------------+--------------+
| LA Volume   | 42      | mL/m2     | [facility] |              |
| Index       |         |           |             |              |
+-------------+---------+-----------+-------------+--------------+
| AV LVOT     | 2.38    | mmHg      | [facility] |              |
| Peak        |         |           |             |              |
 
| Gradient    |         |           |             |              |
+-------------+---------+-----------+-------------+--------------+
| TR Peak     | 18      | mmHg      | [facility] |              |
| Gradient    |         |           |             |              |
+-------------+---------+-----------+-------------+--------------+
| TR Velocity | 214.5   | cm        | [facility] |              |
+-------------+---------+-----------+-------------+--------------+
| LV          | 7.19    | cm        | [facility] |              |
| Diastolic   |         |           |             |              |
| Length 4C   |         |           |             |              |
+-------------+---------+-----------+-------------+--------------+
| LV          | 50      | %         | [facility] |              |
| Moran's   |         |           |             |              |
| Biplane EF  |         |           |             |              |
+-------------+---------+-----------+-------------+--------------+
| LV ED       | 45.92   | ml        | [facility] |              |
| Volume      |         |           |             |              |
| (Moran's) |         |           |             |              |
+-------------+---------+-----------+-------------+--------------+
| LV ED       | 20      | ml/m2     | [facility] |              |
| Volume      |         |           |             |              |
| Index       |         |           |             |              |
+-------------+---------+-----------+-------------+--------------+
| LV ES       | 23.42   | ml        | [facility] |              |
| Volume      |         |           |             |              |
+-------------+---------+-----------+-------------+--------------+
| MV          | 197.48  | msec      | [facility] |              |
| Deceleratio |         |           |             |              |
| n Time      |         |           |             |              |
+-------------+---------+-----------+-------------+--------------+
| MV Peak     | 81.53   | cm/s      | [facility] |              |
| E-Wave      |         |           |             |              |
+-------------+---------+-----------+-------------+--------------+
| LA/Aorta    | 1.19    |           | [facility] |              |
| Ratio       |         |           |             |              |
+-------------+---------+-----------+-------------+--------------+
| LA Area     | 27.42   | cm2       | [facility] |              |
+-------------+---------+-----------+-------------+--------------+
| LV ES       | 10      | ml/m2     | [facility] |              |
| Volume      |         |           |             |              |
| Index       |         |           |             |              |
+-------------+---------+-----------+-------------+--------------+
| Aortic Root | 3.7     | cm        | [facility] |              |
|  Diameter   |         |           |             |              |
+-------------+---------+-----------+-------------+--------------+
| IVS         | 1.15    | cm        | [facility] |              |
| Diastolic   |         |           |             |              |
| Thickness   |         |           |             |              |
| MM          |         |           |             |              |
+-------------+---------+-----------+-------------+--------------+
| LVPW        | 1.21    | cm        | [facility] |              |
| Diastolic   |         |           |             |              |
| Thickness   |         |           |             |              |
| MM          |         |           |             |              |
+-------------+---------+-----------+-------------+--------------+
| IVS         | 1.29    | cm        | [facility] |              |
| Systolic    |         |           |             |              |
| Thickness   |         |           |             |              |
| MM          |         |           |             |              |
+-------------+---------+-----------+-------------+--------------+
 
| LV Systolic | 2.97    | cm        | [facility] |              |
|  Diameter   |         |           |             |              |
| MM          |         |           |             |              |
+-------------+---------+-----------+-------------+--------------+
| LVPW        | 1.65    | cm        | [facility] |              |
| Systolic    |         |           |             |              |
| Thickness   |         |           |             |              |
| MM          |         |           |             |              |
+-------------+---------+-----------+-------------+--------------+
| AV Cusp     | 1.61    | cm        | [facility] |              |
| Seperation  |         |           |             |              |
| MM          |         |           |             |              |
+-------------+---------+-----------+-------------+--------------+
| LA Systolic | 4.4     | cm        | [facility] |              |
|  Diameter   |         |           |             |              |
| MM          |         |           |             |              |
+-------------+---------+-----------+-------------+--------------+
| LVEF-TTE    | 55      | %         | [facility] |              |
| TRANSTHORAC |         |           |             |              |
| IC ECHO     |         |           |             |              |
+-------------+---------+-----------+-------------+--------------+
| RA PRESSURE | 8       | mmHg      | [facility] |              |
+-------------+---------+-----------+-------------+--------------+
| RVSP        | 26      | mmHg      | [facility] |              |
| Estimated   |         |           |             |              |
+-------------+---------+-----------+-------------+--------------+
 
 
 
+----------+
| Specimen |
+----------+
|          |
+----------+
 
 
 
+------------------------------------------------------------------------+---------------+
| Narrative                                                              | Performed At  |
+------------------------------------------------------------------------+---------------+
|   This result has an attachment that is not available.  1.   Mild      |   [facility] |
| biatrial dilatation.2.   Normal left ventricular size with a mild      |               |
| concentric left ventricular hypertrophy.   Left ventricular systolic   |               |
| function is preserved.   LVEF is 55-60%.3.   Mildly thickened and      |               |
| calcified trileaflet aortic valve with adequate opening.   There is    |               |
| aortic valve sclerosis without significant aortic valve stenosis.4.    |               |
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
| change.   There is a mild mitral valve regurgitation.5.   Mild mitral  |               |
| annular calcification.6.   Mild tricuspid valve regurgitation.7.       |               |
|   Normal right-sided pressure.   8.   Dilated IVC with a normal        |               |
| respiratory collapse.9.   When compared to echocardiography on         |               |
| 2018, no significant changes.                                     |               |
|8.   Dilated IVC with a normal respiratory collapse.                    |               |
|9.   When compared to echocardiography on 2018, no significant    |               |
|changes.                                                               |               |
+------------------------------------------------------------------------+---------------+
 
 
 
+---------------+---------+--------------------+--------------+
 
| Performing    | Address | City/State/Zipcode | Phone Number |
| Organization  |         |                    |              |
+---------------+---------+--------------------+--------------+
|   [facility] |         |                    |              |
+---------------+---------+--------------------+--------------+
 NM Nuclear Stress Test (Vasodilator) (10/30/2019 12:56 PM PDT)
 
+-------------+--------+-----------+-------------+--------------+
| Component   | Value  | Ref Range | Performed   | Pathologist  |
|             |        |           | At          | Signature    |
+-------------+--------+-----------+-------------+--------------+
| BASELINE    | 93     | bpm       | [facility] |              |
| HEART RATE  |        |           |             |              |
+-------------+--------+-----------+-------------+--------------+
| BASELINE    | 99/51  | mmHg      | [facility] |              |
| BLOOD       |        |           |             |              |
| PRESSURE    |        |           |             |              |
+-------------+--------+-----------+-------------+--------------+
| PEAK HEART  | 109    |           | [facility] |              |
| RATE        |        |           |             |              |
+-------------+--------+-----------+-------------+--------------+
| PEAK BLOOD  | 111/42 | mmHG      | [facility] |              |
| PRESSURE    |        |           |             |              |
+-------------+--------+-----------+-------------+--------------+
| Target HR   | 126    |           | [facility] |              |
+-------------+--------+-----------+-------------+--------------+
| Percent HR  | 74     |           | [facility] |              |
+-------------+--------+-----------+-------------+--------------+
| LVEF-SPECT  | 63     | %         | [facility] |              |
| NUCLEAR     |        |           |             |              |
| STRESS/VIAB |        |           |             |              |
| ILITY       |        |           |             |              |
+-------------+--------+-----------+-------------+--------------+
| ST          | 0.0    | mm        | [facility] |              |
| Elevation   |        |           |             |              |
| (mm)        |        |           |             |              |
+-------------+--------+-----------+-------------+--------------+
 
 
 
+----------+
| Specimen |
+----------+
|          |
+----------+
 
 
 
+----------------------------------------------------------------------+---------------+
| Narrative                                                            | Performed At  |
+----------------------------------------------------------------------+---------------+
|   This result has an attachment that is not available.  1.           |   [facility] |
|   Persantine EKG is negative.2.   Normal Persantine sestamibi        |               |
| myocardial perfusion imaging study.   Normal left ventricular size,  |               |
| wall thickness and motion.   Preserved left ventricular systolic     |               |
| function.   LVEF by gated SPECT is 63%.                              |               |
+----------------------------------------------------------------------+---------------+
 
 
 
 
+---------------+---------+--------------------+--------------+
| Performing    | Address | City/State/Zipcode | Phone Number |
| Organization  |         |                    |              |
+---------------+---------+--------------------+--------------+
|   [facility] |         |                    |              |
+---------------+---------+--------------------+--------------+
 ECG 12 lead (10/10/2019  1:55 PM PDT)
 
+-------------+--------------------------+-----------+------------+--------------+
| Component   | Value                    | Ref Range | Performed  | Pathologist  |
|             |                          |           | At         | Signature    |
+-------------+--------------------------+-----------+------------+--------------+
| VENTRICULAR | 68                       | BPM       | [facility]  |              |
|  RATE EKG   |                          |           |            |              |
+-------------+--------------------------+-----------+------------+--------------+
| ATRIAL RATE | 57                       | BPM       | [facility]  |              |
+-------------+--------------------------+-----------+------------+--------------+
| QRS         | 90                       | ms        | [facility]  |              |
| DURATION    |                          |           |            |              |
+-------------+--------------------------+-----------+------------+--------------+
| Q-T         | 422                      | ms        | [facility]  |              |
| INTERVAL    |                          |           |            |              |
+-------------+--------------------------+-----------+------------+--------------+
| Q-T         | 448                      | ms        | [facility]  |              |
| INTERVAL    |                          |           |            |              |
| (CORRECTED) |                          |           |            |              |
+-------------+--------------------------+-----------+------------+--------------+
| QRS AXIS    | -33                      | degrees   | [facility]  |              |
+-------------+--------------------------+-----------+------------+--------------+
| T AXIS      | -120                     | degrees   | [facility]  |              |
+-------------+--------------------------+-----------+------------+--------------+
| INTERPRETAT | Possible atrial fibLeft  |           | [facility]  |              |
| ION TEXT    | axis deviationLow        |           |            |              |
|             | voltage QRSSeptal        |           |            |              |
|             | infarct , age            |           |            |              |
|             | undeterminedAbnormal     |           |            |              |
|             | ECGWhen compared with    |           |            |              |
|             | ECG of 21-AUG-2018       |           |            |              |
|             | 15:01,Current            |           |            |              |
|             | undetermined rhythm      |           |            |              |
|             | precludes rhythm         |           |            |              |
|             | comparison, needs        |           |            |              |
|             | reviewST now depressed   |           |            |              |
|             | in Lateral               |           |            |              |
|             | leadsNonspecific T wave  |           |            |              |
|             | abnormality, worse in    |           |            |              |
|             | Inferior                 |           |            |              |
|             | leadsNonspecific T wave  |           |            |              |
|             | abnormality now evident  |           |            |              |
|             | in Lateral               |           |            |              |
|             | leadsConfirmed by        |           |            |              |
|             | ERIC REYES, ABEBE     |           |            |              |
|             | ([id number]) on 10/10/2019    |           |            |              |
|             | 2:40:53 PM               |           |            |              |
+-------------+--------------------------+-----------+------------+--------------+
 
 
 
+----------+
| Specimen |
 
+----------+
|          |
+----------+
 
 
 
+----------------------------------------------------------+--------------+
| Narrative                                                | Performed At |
+----------------------------------------------------------+--------------+
|   This result has an attachment that is not available.   |              |
+----------------------------------------------------------+--------------+
 
 
 
+--------------+---------+--------------------+--------------+
| Performing   | Address | City/State/Zipcode | Phone Number |
| Organization |         |                    |              |
+--------------+---------+--------------------+--------------+
|   [facility]  |         |                    |              |
+--------------+---------+--------------------+--------------+
 documented in this encounter
 
 Visit Diagnoses
 
 
+-------------------------------------------------------------------------------------+
| Diagnosis                                                                           |
+-------------------------------------------------------------------------------------+
|   Coronary artery disease involving native coronary artery of native heart without  |
| angina pectoris - Primary                                                           |
+-------------------------------------------------------------------------------------+
|   Hypertension, essential  Unspecified essential hypertension                       |
+-------------------------------------------------------------------------------------+
|   Mixed hyperlipidemia                                                              |
+-------------------------------------------------------------------------------------+
|   PVC (premature ventricular contraction)  Other premature beats                    |
+-------------------------------------------------------------------------------------+
 documented in this encounter

## 2020-01-08 NOTE — XMS
Encounter Summary
  Created on: 2020
 
 Viviana Ricci
 External Reference #: [id number]
 : 47
 Sex: Female
 
 Demographics
 
 
+-----------------------+----------------------+
| Address               | [address]      |
|                       | [place]  [address] |
+-----------------------+----------------------+
| Home Phone            | [phone]      |
+-----------------------+----------------------+
| Preferred Language    | Unknown              |
+-----------------------+----------------------+
| Marital Status        | Single               |
+-----------------------+----------------------+
| Temple Affiliation | 1009                 |
+-----------------------+----------------------+
| Race                  | Unknown              |
+-----------------------+----------------------+
| Ethnic Group          | Unknown              |
+-----------------------+----------------------+
 
 
 Author
 
 
+--------------+--------------------------------------------+
| Author       | [facility]  |
|              | and [facility]                                |
+--------------+--------------------------------------------+
| Organization | [facility]  |
|              | and [facility]                                |
+--------------+--------------------------------------------+
| Address      | Unknown                                    |
+--------------+--------------------------------------------+
| Phone        | Unavailable                                |
+--------------+--------------------------------------------+
 
 
 
 Support
 
 
+----------------+--------------+---------------------+-----------------+
| Name           | Relationship | Address             | Phone           |
+----------------+--------------+---------------------+-----------------+
| Ada/Ed Radhames | ECON         | [address]              | [phone] |
|                |              | [place]  |                 |
|                |              |  [id number]              |                 |
+----------------+--------------+---------------------+-----------------+
 
 
 
 
 Care Team Providers
 
 
+-----------------------+------+-------------+
| Care Team Member Name | Role | Phone       |
+-----------------------+------+-------------+
 PCP  | Unavailable |
+-----------------------+------+-------------+
 
 
 
 Reason for Visit
 
 
+-------------------+----------+
| Reason            | Comments |
+-------------------+----------+
| Medication Refill |          |
+-------------------+----------+
 
 
 
 Encounter Details
 
 
+--------+--------+---------------------+----------------------+-------------------+
| Date   | Type   | Department          | Care Team            | Description       |
+--------+--------+---------------------+----------------------+-------------------+
| / | Refill |   [facility]         |   Marzena Moser  | Medication Refill |
|    |        | NEPHROLOGY  [address]   | M, DO  [address]      |                   |
|        |        | [address]   | [address]      |                   |
|        |        | [place], [place]     | [place], [place]      |                   |
|        |        | [address]          | [address]  [phone]  |                   |
|        |        | [phone]        |  [phone] (Fax)  |                   |
+--------+--------+---------------------+----------------------+-------------------+
 
 
 
 Social History
 
 
+--------------+-------+-----------+--------+------+
| Tobacco Use  | Types | Packs/Day | Years  | Date |
|              |       |           | Used   |      |
+--------------+-------+-----------+--------+------+
| Never Smoker |       |           |        |      |
+--------------+-------+-----------+--------+------+
 
 
 
+---------------------+---+---+---+
| Smokeless Tobacco:  |   |   |   |
| Never Used          |   |   |   |
+---------------------+---+---+---+
 
 
 
+---------------------------------------------------------------+
| Comments: some second hand smoke exposure, but fairly minimal |
 
+---------------------------------------------------------------+
 
 
 
+-------------+-------------+---------+----------+
| Alcohol Use | Drinks/Week | oz/Week | Comments |
+-------------+-------------+---------+----------+
| No          |             |         |          |
+-------------+-------------+---------+----------+
 
 
 
+------------------+---------------+
| Sex Assigned at  | Date Recorded |
| Birth            |               |
+------------------+---------------+
| Not on file      |               |
+------------------+---------------+
 
 
 
+----------------+-------------+-------------+
| Job Start Date | Occupation  | Industry    |
+----------------+-------------+-------------+
| Not on file    | Not on file | Not on file |
+----------------+-------------+-------------+
 
 
 
+----------------+--------------+------------+
| Travel History | Travel Start | Travel End |
+----------------+--------------+------------+
 
 
 
+-------------------------------------+
| No recent travel history available. |
+-------------------------------------+
 documented as of this encounter
 
 Plan of Treatment
 
 
+--------+-----------+------------+----------------------+-------------------+
| Date   | Type      | Specialty  | Care Team            | Description       |
+--------+-----------+------------+----------------------+-------------------+
| / | Office    | Cardiology |   HellalfredoTonia,    |                   |
|    | Visit     |            | ARNP  [address]   |                   |
|        |           |            | St  [place], [place]  |                   |
|        |           |            | [address]  [phone]  |                   |
|        |           |            |  [phone] (Fax)  |                   |
+--------+-----------+------------+----------------------+-------------------+
| / | Hospital  | Radiology  |   Popeye Townsend, |                   |
|    | Encounter |            |  MD  [address] |                   |
|        |           |            |  St.  [place],   |                   |
|        |           |            | [place] [address]             |                   |
|        |           |            | [phone]         |                   |
|        |           |            | [phone] (Fax)   |                   |
+--------+-----------+------------+----------------------+-------------------+
| / | Surgery   | Radiology  |   Popeye Townsend, | CV EP PPM SYSTEM  |
 
|    |           |            |  MD  [address] | IMPLANT           |
|        |           |            |  St.  [place],   |                   |
|        |           |            | [place] [address]             |                   |
|        |           |            | [phone]         |                   |
|        |           |            | [phone] (Fax)   |                   |
+--------+-----------+------------+----------------------+-------------------+
| 02/10/ | Clinical  | Cardiology |                      |                   |
|    | Support   |            |                      |                   |
+--------+-----------+------------+----------------------+-------------------+
| / | Office    | Cardiology |   Tonia Wilson,    |                   |
|    | Visit     |            | ARNP  [address]   |                   |
|        |           |            | [place]  |                   |
|        |           |            | [address]  [phone]  |                   |
|        |           |            |  [phone] (Fax)  |                   |
+--------+-----------+------------+----------------------+-------------------+
| / | Off-Site  | Nephrology |   Marzena Moser  |                   |
|    | Visit     |            | DO ETIENNE  [address] [place] [address]      |                   |
|        |           |            | [address]      |                   |
|        |           |            | [place]      |                   |
|        |           |            | [address]  [phone]  |                   |
|        |           |            |  [phone] (Fax)  |                   |
+--------+-----------+------------+----------------------+-------------------+
 documented as of this encounter
 
 Visit Diagnoses
 Not on filedocumented in this encounter

## 2020-01-08 NOTE — XMS
Encounter Summary
  Created on: 2020
 
 Viviana Ricci
 External Reference #: [id number]
 : 47
 Sex: Female
 
 Demographics
 
 
+-----------------------+----------------------+
| Address               | [address]      |
|                       | [place]  [address] |
+-----------------------+----------------------+
| Home Phone            | [phone]      |
+-----------------------+----------------------+
| Preferred Language    | Unknown              |
+-----------------------+----------------------+
| Marital Status        | Single               |
+-----------------------+----------------------+
| Mandaeism Affiliation | [id number]                 |
+-----------------------+----------------------+
| Race                  | Unknown              |
+-----------------------+----------------------+
| Ethnic Group          | Unknown              |
+-----------------------+----------------------+
 
 
 Author
 
 
+--------------+--------------------------------------------+
| Author       | [facility]  |
|              | and [place]                                |
+--------------+--------------------------------------------+
| Organization | [facility]  |
|              | and [place]                                |
+--------------+--------------------------------------------+
| Address      | Unknown                                    |
+--------------+--------------------------------------------+
| Phone        | Unavailable                                |
+--------------+--------------------------------------------+
 
 
 
 Support
 
 
+----------------+--------------+---------------------+-----------------+
| Name           | Relationship | Address             | Phone           |
+----------------+--------------+---------------------+-----------------+
| Ada/Ed Radhames | ECON         | [address]              | [phone] |
|                |              | [place]  |                 |
|                |              |  [address]              |                 |
+----------------+--------------+---------------------+-----------------+
 
 
 
 
 Care Team Providers
 
 
+------------------------+------+-----------------+
| Care Team Member Name  | Role | Phone           |
+------------------------+------+-----------------+
| Marzena Moser DO | PCP  | [phone] |
+------------------------+------+-----------------+
 
 
 
 Reason for Visit
 
 
+-------------------+----------+
| Reason            | Comments |
+-------------------+----------+
| Medication Refill |          |
+-------------------+----------+
 
 
 
 Encounter Details
 
 
+--------+--------+---------------------+----------------------+-------------------+
| Date   | Type   | Department          | Care Team            | Description       |
+--------+--------+---------------------+----------------------+-------------------+
| / | Refill |   [facility]         |   Marzena Moser  | Medication Refill |
| 2017   |        | NEPHROLOGY  [address]   | M, DO  [address]      |                   |
|        |        | [address]   | [address]      |                   |
|        |        | [place], [place]     | [place], [place]      |                   |
|        |        | [address]          | [address]  [phone]  |                   |
|        |        | [phone]        |  [phone] (Fax)  |                   |
+--------+--------+---------------------+----------------------+-------------------+
 
 
 
 Social History
 
 
+--------------+-------+-----------+--------+------+
| Tobacco Use  | Types | Packs/Day | Years  | Date |
|              |       |           | Used   |      |
+--------------+-------+-----------+--------+------+
| Never Smoker |       |           |        |      |
+--------------+-------+-----------+--------+------+
 
 
 
+---------------------+---+---+---+
| Smokeless Tobacco:  |   |   |   |
| Never Used          |   |   |   |
+---------------------+---+---+---+
 
 
 
+---------------------------------------------------------------+
| Comments: some second hand smoke exposure, but fairly minimal |
 
+---------------------------------------------------------------+
 
 
 
+-------------+-------------+---------+----------+
| Alcohol Use | Drinks/Week | oz/Week | Comments |
+-------------+-------------+---------+----------+
| No          |             |         |          |
+-------------+-------------+---------+----------+
 
 
 
+------------------+---------------+
| Sex Assigned at  | Date Recorded |
| Birth            |               |
+------------------+---------------+
| Not on file      |               |
+------------------+---------------+
 
 
 
+----------------+-------------+-------------+
| Job Start Date | Occupation  | Industry    |
+----------------+-------------+-------------+
| Not on file    | Not on file | Not on file |
+----------------+-------------+-------------+
 
 
 
+----------------+--------------+------------+
| Travel History | Travel Start | Travel End |
+----------------+--------------+------------+
 
 
 
+-------------------------------------+
| No recent travel history available. |
+-------------------------------------+
 documented as of this encounter
 
 Plan of Treatment
 
 
+--------+-----------+------------+----------------------+-------------------+
| Date   | Type      | Specialty  | Care Team            | Description       |
+--------+-----------+------------+----------------------+-------------------+
| / | Office    | Cardiology |   Tonia Wilson,    |                   |
|    | Visit     |            | ARNP  [address]   |                   |
|        |           |            | St [place], [place]  |                   |
|        |           |            | [address]  [phone]  |                   |
|        |           |            |  [phone] (Fax)  |                   |
+--------+-----------+------------+----------------------+-------------------+
| / | Hospital  | Radiology  |   Popeye Townsend, |                   |
|    | Encounter |            |  MD  [address] |                   |
|        |           |            |  [place],   |                   |
|        |           |            | [place] [address]             |                   |
|        |           |            | [phone]         |                   |
|        |           |            | [phone] (Fax)   |                   |
+--------+-----------+------------+----------------------+-------------------+
| / | Surgery   | Radiology  |   Popeye Townsend, | CV EP PPM SYSTEM  |
 
|    |           |            |  MD  [address] | IMPLANT           |
|        |           |            |  [place],   |                   |
|        |           |            | [place] [address]             |                   |
|        |           |            | [phone]         |                   |
|        |           |            | [phone] (Fax)   |                   |
+--------+-----------+------------+----------------------+-------------------+
| 02/10/ | Clinical  | Cardiology |                      |                   |
|    | Support   |            |                      |                   |
+--------+-----------+------------+----------------------+-------------------+
| / | Office    | Cardiology |   RakeshTonia andre,    |                   |
|    | Visit     |            | ARNP  [address]   |                   |
|        |           |            | [place]  |                   |
|        |           |            | [address] [phone]  |                   |
|        |           |            |  [phone] (Fax)  |                   |
+--------+-----------+------------+----------------------+-------------------+
| / | Off-Site  | Nephrology |   Marzena Moser  |                   |
|    | Visit     |            | DO ETINENE  [address] [place] [address]      |                   |
|        |           |            | [address]      |                   |
|        |           |            | [place]      |                   |
|        |           |            | [address] [phone]  |                   |
|        |           |            |  [phone] (Fax)  |                   |
+--------+-----------+------------+----------------------+-------------------+
 documented as of this encounter
 
 Visit Diagnoses
 Not on filedocumented in this encounter

## 2020-01-08 NOTE — XMS
Encounter Summary
  Created on: 2020
 
 Viviana Ricci
 External Reference #: [id number]
 : 47
 Sex: Female
 
 Demographics
 
 
+-----------------------+----------------------+
| Address               | [address]      |
|                       | [place]  [address] |
+-----------------------+----------------------+
| Home Phone            | [phone]      |
+-----------------------+----------------------+
| Preferred Language    | Unknown              |
+-----------------------+----------------------+
| Marital Status        | Single               |
+-----------------------+----------------------+
| Mandaen Affiliation | [id number]                 |
+-----------------------+----------------------+
| Race                  | Unknown              |
+-----------------------+----------------------+
| Ethnic Group          | Unknown              |
+-----------------------+----------------------+
 
 
 Author
 
 
+--------------+--------------------------------------------+
| Author       | [facility] [place]  |
|              | and [place]                                |
+--------------+--------------------------------------------+
| Organization | [facility] [place]  |
|              | and [place]                                |
+--------------+--------------------------------------------+
| Address      | Unknown                                    |
+--------------+--------------------------------------------+
| Phone        | Unavailable                                |
+--------------+--------------------------------------------+
 
 
 
 Support
 
 
+----------------+--------------+---------------------+-----------------+
| Name           | Relationship | Address             | Phone           |
+----------------+--------------+---------------------+-----------------+
| Ada/Ed Radhames | ECON         | [address]              | [phone] |
|                |              | [place]  |                 |
|                |              |  [address]              |                 |
+----------------+--------------+---------------------+-----------------+
 
 
 
 
 Care Team Providers
 
 
+------------------------+------+-----------------+
| Care Team Member Name  | Role | Phone           |
+------------------------+------+-----------------+
| Marzena Moser DO | PCP  | [phone] |
+------------------------+------+-----------------+
 
 
 
 Encounter Details
 
 
+--------+----------+---------------------+----------------------+-------------+
| Date   | Type     | Department          | Care Team            | Description |
+--------+----------+---------------------+----------------------+-------------+
| 08/15/ | Abstract |   [facility] [place]         |   Marzena Moser  |             |
| 2019   |          | NEPHROLOGY  [address]   | DO ETIENNE  [address]      |             |
|        |          | [address]   | [address]      |             |
|        |          | [place], [place]     | [place], [place]      |             |
|        |          | [address]          | [address]  [phone]  |             |
|        |          | [phone]        |  [phone] (Fax)  |             |
+--------+----------+---------------------+----------------------+-------------+
 
 
 
 Social History
 
 
+--------------+-------+-----------+--------+------+
| Tobacco Use  | Types | Packs/Day | Years  | Date |
|              |       |           | Used   |      |
+--------------+-------+-----------+--------+------+
| Never Smoker |       |           |        |      |
+--------------+-------+-----------+--------+------+
 
 
 
+---------------------+---+---+---+
| Smokeless Tobacco:  |   |   |   |
| Never Used          |   |   |   |
+---------------------+---+---+---+
 
 
 
+---------------------------------------------------------------+
| Comments: some second hand smoke exposure, but fairly minimal |
+---------------------------------------------------------------+
 
 
 
+-------------+-------------+---------+----------+
| Alcohol Use | Drinks/Week | oz/Week | Comments |
+-------------+-------------+---------+----------+
| No          |             |         |          |
+-------------+-------------+---------+----------+
 
 
 
 
+------------------+---------------+
| Sex Assigned at  | Date Recorded |
| Birth            |               |
+------------------+---------------+
| Not on file      |               |
+------------------+---------------+
 
 
 
+----------------+-------------+-------------+
| Job Start Date | Occupation  | Industry    |
+----------------+-------------+-------------+
| Not on file    | Not on file | Not on file |
+----------------+-------------+-------------+
 
 
 
+----------------+--------------+------------+
| Travel History | Travel Start | Travel End |
+----------------+--------------+------------+
 
 
 
+-------------------------------------+
| No recent travel history available. |
+-------------------------------------+
 documented as of this encounter
 
 Plan of Treatment
 
 
+--------+-----------+------------+----------------------+-------------------+
| Date   | Type      | Specialty  | Care Team            | Description       |
+--------+-----------+------------+----------------------+-------------------+
| / | Office    | Cardiology |   Tonia Wilson,    |                   |
|    | Visit     |            | ARNP  [address]   |                   |
|        |           |            | St  [place] [facility] [place]  |                   |
|        |           |            | [address]  [phone]  |                   |
|        |           |            |  [phone] (Fax)  |                   |
+--------+-----------+------------+----------------------+-------------------+
| / | Hospital  | Radiology  |   Popeye Townsend, |                   |
|    | Encounter |            |  MD  [address] |                   |
|        |           |            |  St.  [place],   |                   |
|        |           |            | [place] [address]             |                   |
|        |           |            | [phone]         |                   |
|        |           |            | [phone] (Fax)   |                   |
+--------+-----------+------------+----------------------+-------------------+
| / | Surgery   | Radiology  |   Popeye Townsend, | CV EP PPM SYSTEM  |
|    |           |            |  MD  [address] | IMPLANT           |
|        |           |            |  St.  [place],   |                   |
|        |           |            | [place] [address]             |                   |
|        |           |            | [phone]         |                   |
|        |           |            | [phone] (Fax)   |                   |
+--------+-----------+------------+----------------------+-------------------+
| 02/10/ | Clinical  | Cardiology |                      |                   |
2020   | Support   |            |                      |                   |
+--------+-----------+------------+----------------------+-------------------+
| / | Office    | Cardiology |   Tonia Wilson,    |                   |
|    | Visit     |            | [facility]  [address]   |                   |
 
|        |           |            |   [place]  |                   |
|        |           |            | [address]  [phone]  |                   |
|        |           |            |  [phone] (Fax)  |                   |
+--------+-----------+------------+----------------------+-------------------+
| / | Off-Site  | Nephrology |   Marzena Moser  |                   |
2020   | Visit     |            | DO ETIENNE  [address]      |                   |
|        |           |            | [address]      |                   |
|        |           |            | [place]      |                   |
|        |           |            | [address]  [phone]  |                   |
|        |           |            |  [phone] (Fax)  |                   |
+--------+-----------+------------+----------------------+-------------------+
 documented as of this encounter
 
 Procedures
 
 
+--------------------+--------+------------+----------------------+----------------------+
| Procedure Name     | Priori | Date/Time  | Associated Diagnosis | Comments             |
|                    | ty     |            |                      |                      |
+--------------------+--------+------------+----------------------+----------------------+
| EXTERNAL LAB:      | Routin | 2019 |                      |   Results for this   |
| TACROLIMUS LEVEL,  | e      |            |                      | procedure are in the |
| LC-MS/MS           |        |            |                      |  results section.    |
+--------------------+--------+------------+----------------------+----------------------+
 documented in this encounter
 
 Results
 External Lab: Tacrolimus Level, LC-MS/MS (2019)
 
+-------------+-------+-----------+------------+--------------+
| Component   | Value | Ref Range | Performed  | Pathologist  |
|             |       |           | At         | Signature    |
+-------------+-------+-----------+------------+--------------+
| Tacrolimus, | 7.8   |           |            |              |
|  LC-MS/MS,  |       |           |            |              |
| External    |       |           |            |              |
+-------------+-------+-----------+------------+--------------+
 
 
 
+----------+
| Specimen |
+----------+
|          |
+----------+
 documented in this encounter
 
 Visit Diagnoses
 Not on filedocumented in this encounter

## 2020-01-08 NOTE — XMS
Encounter Summary
  Created on: 2020
 
 Viviana Ricci
 External Reference #: [id number]
 : 47
 Sex: Female
 
 Demographics
 
 
+-----------------------+----------------------+
| Address               | [address]      |
|                       | [place]  [address] |
+-----------------------+----------------------+
| Home Phone            | [phone]      |
+-----------------------+----------------------+
| Preferred Language    | Unknown              |
+-----------------------+----------------------+
| Marital Status        | Single               |
+-----------------------+----------------------+
| Caodaism Affiliation | [id number]                 |
+-----------------------+----------------------+
| Race                  | Unknown              |
+-----------------------+----------------------+
| Ethnic Group          | Unknown              |
+-----------------------+----------------------+
 
 
 Author
 
 
+--------------+--------------------------------------------+
| Author       | [facility]  |
|              | and [facility]                                |
+--------------+--------------------------------------------+
| Organization | [facility]  |
|              | and [place]                                |
+--------------+--------------------------------------------+
| Address      | Unknown                                    |
+--------------+--------------------------------------------+
| Phone        | Unavailable                                |
+--------------+--------------------------------------------+
 
 
 
 Support
 
 
+----------------+--------------+---------------------+-----------------+
| Name           | Relationship | Address             | Phone           |
+----------------+--------------+---------------------+-----------------+
| Ada/Ed Radhames | ECON         | [address]              | [phone] |
|                |              | [place]  |                 |
|                |              |  [address]              |                 |
+----------------+--------------+---------------------+-----------------+
 
 
 
 
 Care Team Providers
 
 
+------------------------+------+-----------------+
| Care Team Member Name  | Role | Phone           |
+------------------------+------+-----------------+
| Marzena Moser DO | PCP  | [phone] |
+------------------------+------+-----------------+
 
 
 
 Reason for Visit
 
 
+-------------------+----------+
| Reason            | Comments |
+-------------------+----------+
| Medication Refill |          |
+-------------------+----------+
 
 
 
 Encounter Details
 
 
+--------+--------+---------------------+----------------------+-------------------+
| Date   | Type   | Department          | Care Team            | Description       |
+--------+--------+---------------------+----------------------+-------------------+
| / | Refill |   [facility]         |   Marzena Moser  | Medication Refill |
| 2018   |        | NEPHROLOGY  [address]   | M, DO  [address]      |                   |
|        |        | [address]   | [address]      |                   |
|        |        | [place], [place]     | [place], [place]      |                   |
|        |        | [address]          | [address]  [phone]  |                   |
|        |        | [phone]        |  [phone] (Fax)  |                   |
+--------+--------+---------------------+----------------------+-------------------+
 
 
 
 Social History
 
 
+--------------+-------+-----------+--------+------+
| Tobacco Use  | Types | Packs/Day | Years  | Date |
|              |       |           | Used   |      |
+--------------+-------+-----------+--------+------+
| Never Smoker |       |           |        |      |
+--------------+-------+-----------+--------+------+
 
 
 
+---------------------+---+---+---+
| Smokeless Tobacco:  |   |   |   |
| Never Used          |   |   |   |
+---------------------+---+---+---+
 
 
 
+---------------------------------------------------------------+
| Comments: some second hand smoke exposure, but fairly minimal |
 
+---------------------------------------------------------------+
 
 
 
+-------------+-------------+---------+----------+
| Alcohol Use | Drinks/Week | oz/Week | Comments |
+-------------+-------------+---------+----------+
| No          |             |         |          |
+-------------+-------------+---------+----------+
 
 
 
+------------------+---------------+
| Sex Assigned at  | Date Recorded |
| Birth            |               |
+------------------+---------------+
| Not on file      |               |
+------------------+---------------+
 
 
 
+----------------+-------------+-------------+
| Job Start Date | Occupation  | Industry    |
+----------------+-------------+-------------+
| Not on file    | Not on file | Not on file |
+----------------+-------------+-------------+
 
 
 
+----------------+--------------+------------+
| Travel History | Travel Start | Travel End |
+----------------+--------------+------------+
 
 
 
+-------------------------------------+
| No recent travel history available. |
+-------------------------------------+
 documented as of this encounter
 
 Plan of Treatment
 
 
+--------+-----------+------------+----------------------+-------------------+
| Date   | Type      | Specialty  | Care Team            | Description       |
+--------+-----------+------------+----------------------+-------------------+
| / | Office    | Cardiology |   Tonia Wilson,    |                   |
|    | Visit     |            | ARNP  [address]   |                   |
|        |           |            | St [place], [place]  |                   |
|        |           |            | [address]  [phone]  |                   |
|        |           |            |  [phone] (Fax)  |                   |
+--------+-----------+------------+----------------------+-------------------+
| / | Hospital  | Radiology  |   Popeye Townsend, |                   |
|    | Encounter |            |  MD  [address] |                   |
|        |           |            |  [place],   |                   |
|        |           |            | [place] [address]             |                   |
|        |           |            | [phone]         |                   |
|        |           |            | [phone] (Fax)   |                   |
+--------+-----------+------------+----------------------+-------------------+
| / | Surgery   | Radiology  |   Popeye Townsend, | CV EP PPM SYSTEM  |
 
|    |           |            |  MD  [address] | IMPLANT           |
|        |           |            |  [place],   |                   |
|        |           |            | [place] [address]             |                   |
|        |           |            | [phone]         |                   |
|        |           |            | [phone] (Fax)   |                   |
+--------+-----------+------------+----------------------+-------------------+
| 02/10/ | Clinical  | Cardiology |                      |                   |
|    | Support   |            |                      |                   |
+--------+-----------+------------+----------------------+-------------------+
| / | Office    | Cardiology |   RakeshTonia andre,    |                   |
|    | Visit     |            | ARNP  [address]   |                   |
|        |           |            | [place]  |                   |
|        |           |            | [address] [phone]  |                   |
|        |           |            |  [phone] (Fax)  |                   |
+--------+-----------+------------+----------------------+-------------------+
| / | Off-Site  | Nephrology |   Marzena Moser  |                   |
|    | Visit     |            | DO ETIENNE  [address] [place] [address]      |                   |
|        |           |            | [address]      |                   |
|        |           |            | [place]      |                   |
|        |           |            | [address] [phone]  |                   |
|        |           |            |  [phone] (Fax)  |                   |
+--------+-----------+------------+----------------------+-------------------+
 documented as of this encounter
 
 Visit Diagnoses
 Not on filedocumented in this encounter

## 2020-01-08 NOTE — XMS
Encounter Summary
  Created on: 2020
 
 Viviana Ricci
 External Reference #: [id number]
 : 47
 Sex: Female
 
 Demographics
 
 
+-----------------------+----------------------+
| Address               | [address]      |
|                       | [place]  [address] |
+-----------------------+----------------------+
| Home Phone            | [phone]      |
+-----------------------+----------------------+
| Preferred Language    | Unknown              |
+-----------------------+----------------------+
| Marital Status        | Single               |
+-----------------------+----------------------+
| Baptism Affiliation | 1009                 |
+-----------------------+----------------------+
| Race                  | Unknown              |
+-----------------------+----------------------+
| Ethnic Group          | Unknown              |
+-----------------------+----------------------+
 
 
 Author
 
 
+--------------+--------------------------------------------+
| Author       | [facility]  |
|              | and [place]                                |
+--------------+--------------------------------------------+
| Organization | [facility]  |
|              | and [facility]                                |
+--------------+--------------------------------------------+
| Address      | Unknown                                    |
+--------------+--------------------------------------------+
| Phone        | Unavailable                                |
+--------------+--------------------------------------------+
 
 
 
 Support
 
 
+----------------+--------------+---------------------+-----------------+
| Name           | Relationship | Address             | Phone           |
+----------------+--------------+---------------------+-----------------+
| Ada/Ed Radhames | ECON         | [address]              | [phone] |
|                |              | [place]  |                 |
|                |              |  [address]              |                 |
+----------------+--------------+---------------------+-----------------+
 
 
 
 
 Care Team Providers
 
 
+-----------------------+------+-------------+
| Care Team Member Name | Role | Phone       |
+-----------------------+------+-------------+
 PCP  | Unavailable |
+-----------------------+------+-------------+
 
 
 
 Reason for Visit
 
 
+-------------------+----------+
| Reason            | Comments |
+-------------------+----------+
| Medication Refill |          |
+-------------------+----------+
 
 
 
 Encounter Details
 
 
+--------+--------+---------------------+----------------------+-------------------+
| Date   | Type   | Department          | Care Team            | Description       |
+--------+--------+---------------------+----------------------+-------------------+
| / | Refill |   [facility]         |   Marzena Moser  | Medication Refill |
|    |        | NEPHROLOGY  [address]   | M, DO  [address]      |                   |
|        |        | [address]   | [address]      |                   |
|        |        | [place], [place]     | [place], [place]      |                   |
|        |        | [address]          | [address]  [phone]  |                   |
|        |        | [phone]        |  [phone] (Fax)  |                   |
+--------+--------+---------------------+----------------------+-------------------+
 
 
 
 Social History
 
 
+--------------+-------+-----------+--------+------+
| Tobacco Use  | Types | Packs/Day | Years  | Date |
|              |       |           | Used   |      |
+--------------+-------+-----------+--------+------+
| Never Smoker |       |           |        |      |
+--------------+-------+-----------+--------+------+
 
 
 
+---------------------+---+---+---+
| Smokeless Tobacco:  |   |   |   |
| Never Used          |   |   |   |
+---------------------+---+---+---+
 
 
 
+---------------------------------------------------------------+
| Comments: some second hand smoke exposure, but fairly minimal |
 
+---------------------------------------------------------------+
 
 
 
+-------------+-------------+---------+----------+
| Alcohol Use | Drinks/Week | oz/Week | Comments |
+-------------+-------------+---------+----------+
| No          |             |         |          |
+-------------+-------------+---------+----------+
 
 
 
+------------------+---------------+
| Sex Assigned at  | Date Recorded |
| Birth            |               |
+------------------+---------------+
| Not on file      |               |
+------------------+---------------+
 
 
 
+----------------+-------------+-------------+
| Job Start Date | Occupation  | Industry    |
+----------------+-------------+-------------+
| Not on file    | Not on file | Not on file |
+----------------+-------------+-------------+
 
 
 
+----------------+--------------+------------+
| Travel History | Travel Start | Travel End |
+----------------+--------------+------------+
 
 
 
+-------------------------------------+
| No recent travel history available. |
+-------------------------------------+
 documented as of this encounter
 
 Plan of Treatment
 
 
+--------+-----------+------------+----------------------+-------------------+
| Date   | Type      | Specialty  | Care Team            | Description       |
+--------+-----------+------------+----------------------+-------------------+
| / | Office    | Cardiology |   HellalfredoTonia,    |                   |
|    | Visit     |            | ARNP  [address]   |                   |
|        |           |            | St  [place], [place]  |                   |
|        |           |            | [address]  [phone]  |                   |
|        |           |            |  [phone] (Fax)  |                   |
+--------+-----------+------------+----------------------+-------------------+
| / | Hospital  | Radiology  |   Popeye Townsend, |                   |
|    | Encounter |            |  MD  [address] |                   |
|        |           |            |  St.  [place],   |                   |
|        |           |            | [place] [address]             |                   |
|        |           |            | [phone]         |                   |
|        |           |            | [phone] (Fax)   |                   |
+--------+-----------+------------+----------------------+-------------------+
| / | Surgery   | Radiology  |   Popeye Townsend, | CV EP PPM SYSTEM  |
 
|    |           |            |  MD  [address] | IMPLANT           |
|        |           |            |  St.  [place],   |                   |
|        |           |            | [place] [address]             |                   |
|        |           |            | [phone]         |                   |
|        |           |            | [phone] (Fax)   |                   |
+--------+-----------+------------+----------------------+-------------------+
| 02/10/ | Clinical  | Cardiology |                      |                   |
|    | Support   |            |                      |                   |
+--------+-----------+------------+----------------------+-------------------+
| / | Office    | Cardiology |   Tonia Wilson,    |                   |
|    | Visit     |            | ARNP  [address]   |                   |
|        |           |            | [place]  |                   |
|        |           |            | [address]  [phone]  |                   |
|        |           |            |  [phone] (Fax)  |                   |
+--------+-----------+------------+----------------------+-------------------+
| / | Off-Site  | Nephrology |   Marzena Moser  |                   |
|    | Visit     |            | DO ETIENNE  [address] [place] [address]      |                   |
|        |           |            | [address]      |                   |
|        |           |            | [place]      |                   |
|        |           |            | [address]  [phone]  |                   |
|        |           |            |  [phone] (Fax)  |                   |
+--------+-----------+------------+----------------------+-------------------+
 documented as of this encounter
 
 Visit Diagnoses
 Not on filedocumented in this encounter

## 2020-01-08 NOTE — XMS
Encounter Summary
  Created on: 2020
 
 Viviana Ricci
 External Reference #: [id number]
 : 47
 Sex: Female
 
 Demographics
 
 
+-----------------------+----------------------+
| Address               | [address]      |
|                       | [place]  [address] |
+-----------------------+----------------------+
| Home Phone            | [phone]      |
+-----------------------+----------------------+
| Preferred Language    | Unknown              |
+-----------------------+----------------------+
| Marital Status        | Single               |
+-----------------------+----------------------+
| Confucianist Affiliation | [id number]                 |
+-----------------------+----------------------+
| Race                  | Unknown              |
+-----------------------+----------------------+
| Ethnic Group          | Unknown              |
+-----------------------+----------------------+
 
 
 Author
 
 
+--------------+--------------------------------------------+
| Author       | [facility]  |
|              | and [place]                                |
+--------------+--------------------------------------------+
| Organization | [facility]  |
|              | and [place]                                |
+--------------+--------------------------------------------+
| Address      | Unknown                                    |
+--------------+--------------------------------------------+
| Phone        | Unavailable                                |
+--------------+--------------------------------------------+
 
 
 
 Support
 
 
+----------------+--------------+---------------------+-----------------+
| Name           | Relationship | Address             | Phone           |
+----------------+--------------+---------------------+-----------------+
| Ada/Ed Radhames | ECON         | [address]              | [phone] |
|                |              | [place]  |                 |
|                |              |  [address]              |                 |
+----------------+--------------+---------------------+-----------------+
 
 
 
 
 Care Team Providers
 
 
+-----------------------+------+-------------+
| Care Team Member Name | Role | Phone       |
+-----------------------+------+-------------+
 PCP  | Unavailable |
+-----------------------+------+-------------+
 
 
 
 Encounter Details
 
 
+--------+-----------+----------------------+----------------------+-------------+
| Date   | Type      | Department           | Care Team            | Description |
+--------+-----------+----------------------+----------------------+-------------+
| / | [facility]  |   [facility] |   Marzena Moser  |             |
|  - | Encounter |  MED CTR MED ONC     | M, DO  [address]      |             |
|        |           | [address]  | [place]      |             |
| / |           | [place] [address] | [place]      |             |
|    |           |   [phone]       | [address]  [phone]  |             |
|        |           |                      |  [phone] (Fax)  |             |
+--------+-----------+----------------------+----------------------+-------------+
 
 
 
 Social History
 
 
+----------------+-------+-----------+--------+------+
| Tobacco Use    | Types | Packs/Day | Years  | Date |
|                |       |           | Used   |      |
+----------------+-------+-----------+--------+------+
| Never Assessed |       |           |        |      |
+----------------+-------+-----------+--------+------+
 
 
 
+------------------+---------------+
| Sex Assigned at  | Date Recorded |
| Birth            |               |
+------------------+---------------+
| Not on file      |               |
+------------------+---------------+
 
 
 
+----------------+-------------+-------------+
| Job Start Date | Occupation  | Industry    |
+----------------+-------------+-------------+
| Not on file    | Not on file | Not on file |
+----------------+-------------+-------------+
 
 
 
+----------------+--------------+------------+
| Travel History | Travel Start | Travel End |
+----------------+--------------+------------+
 
 
 
 
+-------------------------------------+
| No recent travel history available. |
+-------------------------------------+
 documented as of this encounter
 
 Plan of Treatment
 
 
+--------+-----------+------------+----------------------+-------------------+
| Date   | Type      | Specialty  | Care Team            | Description       |
+--------+-----------+------------+----------------------+-------------------+
| / | Office    | Cardiology |   Tonia Wilson,    |                   |
|    | Visit     |            | ARNJESSICA  [address]   |                   |
|        |           |            | St  [place], [place]  |                   |
|        |           |            | [address]  [phone]  |                   |
|        |           |            |  [phone] (Fax)  |                   |
+--------+-----------+------------+----------------------+-------------------+
| / | Hospital  | Radiology  |   Popeye Townsend, |                   |
|    | Encounter |            |  MD  [address] |                   |
|        |           |            |  St.  [place],   |                   |
|        |           |            | [place] [address]             |                   |
|        |           |            | [phone]         |                   |
|        |           |            | [phone] (Fax)   |                   |
+--------+-----------+------------+----------------------+-------------------+
| / | Surgery   | Radiology  |   Popeye Townsend, | CV EP PPM SYSTEM  |
|    |           |            |  MD  [address] | IMPLANT           |
|        |           |            |  St.  [place],   |                   |
|        |           |            | [place] [address]             |                   |
|        |           |            | [phone]         |                   |
|        |           |            | [phone] (Fax)   |                   |
+--------+-----------+------------+----------------------+-------------------+
| 02/10/ | Clinical  | Cardiology |                      |                   |
|    | Support   |            |                      |                   |
+--------+-----------+------------+----------------------+-------------------+
| / | Office    | Cardiology |   Tonia Wilson,    |                   |
|    | Visit     |            | ARNP  [address]   |                   |
|        |           |            | [place]  |                   |
|        |           |            | [address]  [phone]  |                   |
|        |           |            |  [phone] (Fax)  |                   |
+--------+-----------+------------+----------------------+-------------------+
| / | Off-Site  | Nephrology |   Marzena Moser  |                   |
|    | Visit     |            | DO ETIENNE  [address] [place] [address]      |                   |
|        |           |            | [address]      |                   |
|        |           |            | [place]      |                   |
|        |           |            | [address] [phone]  |                   |
|        |           |            |  [phone] (Fax)  |                   |
+--------+-----------+------------+----------------------+-------------------+
 documented as of this encounter
 
 Visit Diagnoses
 Not on filedocumented in this encounter

## 2020-01-08 NOTE — XMS
Encounter Summary
  Created on: 2020
 
 Viviana Ricci
 External Reference #: [id number]
 : 47
 Sex: Female
 
 Demographics
 
 
+-----------------------+----------------------+
| Address               | [address]      |
|                       | [place]  [address] |
+-----------------------+----------------------+
| Home Phone            | [phone]      |
+-----------------------+----------------------+
| Preferred Language    | Unknown              |
+-----------------------+----------------------+
| Marital Status        | Single               |
+-----------------------+----------------------+
| Moravian Affiliation | [id number]                 |
+-----------------------+----------------------+
| Race                  | Unknown              |
+-----------------------+----------------------+
| Ethnic Group          | Unknown              |
+-----------------------+----------------------+
 
 
 Author
 
 
+--------------+--------------------------------------------+
| Author       | [facility]  |
|              | and [place]                                |
+--------------+--------------------------------------------+
| Organization | [facility]  |
|              | and [place]                                |
+--------------+--------------------------------------------+
| Address      | Unknown                                    |
+--------------+--------------------------------------------+
| Phone        | Unavailable                                |
+--------------+--------------------------------------------+
 
 
 
 Support
 
 
+----------------+--------------+---------------------+-----------------+
| Name           | Relationship | Address             | Phone           |
+----------------+--------------+---------------------+-----------------+
| Ada/Ed Radhames | ECON         | [address]              | [phone] |
|                |              | [place]  |                 |
|                |              |  [address]              |                 |
+----------------+--------------+---------------------+-----------------+
 
 
 
 
 Care Team Providers
 
 
+-----------------------+------+-------------+
| Care Team Member Name | Role | Phone       |
+-----------------------+------+-------------+
 PCP  | Unavailable |
+-----------------------+------+-------------+
 
 
 
 Encounter Details
 
 
+--------+-----------+----------------------+----------------------+-------------+
| Date   | Type      | Department           | Care Team            | Description |
+--------+-----------+----------------------+----------------------+-------------+
| / | [facility]  |   [facility] |   Edgar Sanders,   |             |
|    | Encounter |  MED CTR XRAY  [address] | MD [address]      |             |
|        |           |  [address]       | [place]      |             |
|        |           | [place] [address] | [address]  [phone]  |             |
|        |           |   [phone]       |  [phone] (Fax)  |             |
+--------+-----------+----------------------+----------------------+-------------+
 
 
 
 Social History
 
 
+----------------+-------+-----------+--------+------+
| Tobacco Use    | Types | Packs/Day | Years  | Date |
|                |       |           | Used   |      |
+----------------+-------+-----------+--------+------+
| Never Assessed |       |           |        |      |
+----------------+-------+-----------+--------+------+
 
 
 
+------------------+---------------+
| Sex Assigned at  | Date Recorded |
| Birth            |               |
+------------------+---------------+
| Not on file      |               |
+------------------+---------------+
 
 
 
+----------------+-------------+-------------+
| Job Start Date | Occupation  | Industry    |
+----------------+-------------+-------------+
| Not on file    | Not on file | Not on file |
+----------------+-------------+-------------+
 
 
 
+----------------+--------------+------------+
| Travel History | Travel Start | Travel End |
+----------------+--------------+------------+
 
 
 
 
+-------------------------------------+
| No recent travel history available. |
+-------------------------------------+
 documented as of this encounter
 
 Plan of Treatment
 
 
+--------+-----------+------------+----------------------+-------------------+
| Date   | Type      | Specialty  | Care Team            | Description       |
+--------+-----------+------------+----------------------+-------------------+
| / | Office    | Cardiology |   Tonia Wilson,    |                   |
|    | Visit     |            | ARNP  [address]   |                   |
|        |           |            | St [place], [place]  |                   |
|        |           |            | [address]  [phone]  |                   |
|        |           |            |  [phone] (Fax)  |                   |
+--------+-----------+------------+----------------------+-------------------+
| / | Hospital  | Radiology  |   Popeye Townsend, |                   |
|    | Encounter |            |  MD  [address] |                   |
|        |           |            |  [place],   |                   |
|        |           |            | [place] [address]             |                   |
|        |           |            | [phone]         |                   |
|        |           |            | [phone] (Fax)   |                   |
+--------+-----------+------------+----------------------+-------------------+
| / | Surgery   | Radiology  |   Popeye Townsend, | CV EP PPM SYSTEM  |
|    |           |            |  MD  [address] | IMPLANT           |
|        |           |            |  [place],   |                   |
|        |           |            | [place] [address]             |                   |
|        |           |            | [phone]         |                   |
|        |           |            | [phone] (Fax)   |                   |
+--------+-----------+------------+----------------------+-------------------+
| 02/10/ | Clinical  | Cardiology |                      |                   |
|    | Support   |            |                      |                   |
+--------+-----------+------------+----------------------+-------------------+
| / | Office    | Cardiology |   Tonia Wilson,    |                   |
|    | Visit     |            | [facility]  [address]   |                   |
|        |           |            | [place]  |                   |
|        |           |            | [address]  [phone]  |                   |
|        |           |            |  [phone] (Fax)  |                   |
+--------+-----------+------------+----------------------+-------------------+
| / | Off-Site  | Nephrology |   Marzena Moser  |                   |
|    | Visit     |            | DO ETIENNE  [address] [place] [address]      |                   |
|        |           |            | [address]      |                   |
|        |           |            | [place]      |                   |
|        |           |            | [address] [phone]  |                   |
|        |           |            |  [phone] (Fax)  |                   |
+--------+-----------+------------+----------------------+-------------------+
 documented as of this encounter
 
 Visit Diagnoses
 Not on filedocumented in this encounter

## 2020-01-08 NOTE — XMS
Encounter Summary
  Created on: 2020
 
 Viviana Ricci
 External Reference #: [id number]
 : 47
 Sex: Female
 
 Demographics
 
 
+-----------------------+----------------------+
| Address               | [address]      |
|                       | [place]  [address] |
+-----------------------+----------------------+
| Home Phone            | [phone]      |
+-----------------------+----------------------+
| Preferred Language    | Unknown              |
+-----------------------+----------------------+
| Marital Status        | Single               |
+-----------------------+----------------------+
| Christian Affiliation | 1009                 |
+-----------------------+----------------------+
| Race                  | Unknown              |
+-----------------------+----------------------+
| Ethnic Group          | Unknown              |
+-----------------------+----------------------+
 
 
 Author
 
 
+--------------+--------------------------------------------+
| Author       | [facility]  |
|              | and [place]                                |
+--------------+--------------------------------------------+
| Organization | [facility]ated [facility]  |
|              | and [place]                                |
+--------------+--------------------------------------------+
| Address      | Unknown                                    |
+--------------+--------------------------------------------+
| Phone        | Unavailable                                |
+--------------+--------------------------------------------+
 
 
 
 Support
 
 
+----------------+--------------+---------------------+-----------------+
| Name           | Relationship | Address             | Phone           |
+----------------+--------------+---------------------+-----------------+
| Ada/Ed Radhames | ECON         | [address]              | [phone] |
|                |              | [place]  |                 |
|                |              |  [address]              |                 |
+----------------+--------------+---------------------+-----------------+
 
 
 
 
 Care Team Providers
 
 
+-----------------------+------+-------------+
| Care Team Member Name | Role | Phone       |
+-----------------------+------+-------------+
 PCP  | Unavailable |
+-----------------------+------+-------------+
 
 
 
 Reason for Visit
 
 
+-------------------+----------+
| Reason            | Comments |
+-------------------+----------+
| Medication Refill |          |
+-------------------+----------+
 
 
 
 Encounter Details
 
 
+--------+--------+---------------------+----------------------+-------------------+
| Date   | Type   | Department          | Care Team            | Description       |
+--------+--------+---------------------+----------------------+-------------------+
| / | Refill |   [facility]         |   Marzena Moser  | Medication Refill |
| 2018   |        | NEPHROLOGY  [address]   | M, DO  [address]      |                   |
|        |        | [address]   | [address]      |                   |
|        |        | [place], [place]     | [place], [place]      |                   |
|        |        | [address]          | [address]  [phone]  |                   |
|        |        | [phone]        |  [phone] (Fax)  |                   |
+--------+--------+---------------------+----------------------+-------------------+
 
 
 
 Social History
 
 
+--------------+-------+-----------+--------+------+
| Tobacco Use  | Types | Packs/Day | Years  | Date |
|              |       |           | Used   |      |
+--------------+-------+-----------+--------+------+
| Never Smoker |       |           |        |      |
+--------------+-------+-----------+--------+------+
 
 
 
+---------------------+---+---+---+
| Smokeless Tobacco:  |   |   |   |
| Never Used          |   |   |   |
+---------------------+---+---+---+
 
 
 
+---------------------------------------------------------------+
| Comments: some second hand smoke exposure, but fairly minimal |
 
+---------------------------------------------------------------+
 
 
 
+-------------+-------------+---------+----------+
| Alcohol Use | Drinks/Week | oz/Week | Comments |
+-------------+-------------+---------+----------+
| No          |             |         |          |
+-------------+-------------+---------+----------+
 
 
 
+------------------+---------------+
| Sex Assigned at  | Date Recorded |
| Birth            |               |
+------------------+---------------+
| Not on file      |               |
+------------------+---------------+
 
 
 
+----------------+-------------+-------------+
| Job Start Date | Occupation  | Industry    |
+----------------+-------------+-------------+
| Not on file    | Not on file | Not on file |
+----------------+-------------+-------------+
 
 
 
+----------------+--------------+------------+
| Travel History | Travel Start | Travel End |
+----------------+--------------+------------+
 
 
 
+-------------------------------------+
| No recent travel history available. |
+-------------------------------------+
 documented as of this encounter
 
 Plan of Treatment
 
 
+--------+-----------+------------+----------------------+-------------------+
| Date   | Type      | Specialty  | Care Team            | Description       |
+--------+-----------+------------+----------------------+-------------------+
| / | Office    | Cardiology |   HellalfredoTonia,    |                   |
|    | Visit     |            | ARNP  [address]   |                   |
|        |           |            | St  [place], [place]  |                   |
|        |           |            | [address]  [phone]  |                   |
|        |           |            |  [phone] (Fax)  |                   |
+--------+-----------+------------+----------------------+-------------------+
| / | Hospital  | Radiology  |   Popeye Townsend, |                   |
|    | Encounter |            |  MD  [address] |                   |
|        |           |            |  St.  [place],   |                   |
|        |           |            | [place] [address]             |                   |
|        |           |            | [phone]         |                   |
|        |           |            | [phone] (Fax)   |                   |
+--------+-----------+------------+----------------------+-------------------+
| / | Surgery   | Radiology  |   Popeye Townsend, | CV EP PPM SYSTEM  |
 
|    |           |            |  MD  [address] | IMPLANT           |
|        |           |            |  St.  [place],   |                   |
|        |           |            | [place] [address]             |                   |
|        |           |            | [phone]         |                   |
|        |           |            | [phone] (Fax)   |                   |
+--------+-----------+------------+----------------------+-------------------+
| 02/10/ | Clinical  | Cardiology |                      |                   |
|    | Support   |            |                      |                   |
+--------+-----------+------------+----------------------+-------------------+
| / | Office    | Cardiology |   Tonia Wilson,    |                   |
|    | Visit     |            | ARNP  [address]   |                   |
|        |           |            | [place]  |                   |
|        |           |            | [address]  [phone]  |                   |
|        |           |            |  [phone] (Fax)  |                   |
+--------+-----------+------------+----------------------+-------------------+
| / | Off-Site  | Nephrology |   Marzena Moser  |                   |
|    | Visit     |            | DO ETIENNE  [address] [place] [address]      |                   |
|        |           |            | [address]      |                   |
|        |           |            | [place]      |                   |
|        |           |            | [address]  [phone]  |                   |
|        |           |            |  [phone] (Fax)  |                   |
+--------+-----------+------------+----------------------+-------------------+
 documented as of this encounter
 
 Visit Diagnoses
 Not on filedocumented in this encounter

## 2020-01-08 NOTE — XMS
Encounter Summary
  Created on: 2020
 
 Viviana Ricci
 External Reference #: [id number]
 : 47
 Sex: Female
 
 Demographics
 
 
+-----------------------+----------------------+
| Address               | [address]      |
|                       | [place]  [address] |
+-----------------------+----------------------+
| Home Phone            | [phone]      |
+-----------------------+----------------------+
| Preferred Language    | Unknown              |
+-----------------------+----------------------+
| Marital Status        | Single               |
+-----------------------+----------------------+
| Faith Affiliation | [id number]                 |
+-----------------------+----------------------+
| Race                  | Unknown              |
+-----------------------+----------------------+
| Ethnic Group          | Unknown              |
+-----------------------+----------------------+
 
 
 Author
 
 
+--------------+--------------------------------------------+
| Author       | [facility] [place]  |
|              | and [place]                                |
+--------------+--------------------------------------------+
| Organization | [facility] [place]  |
|              | and [place]                                |
+--------------+--------------------------------------------+
| Address      | Unknown                                    |
+--------------+--------------------------------------------+
| Phone        | Unavailable                                |
+--------------+--------------------------------------------+
 
 
 
 Support
 
 
+----------------+--------------+---------------------+-----------------+
| Name           | Relationship | Address             | Phone           |
+----------------+--------------+---------------------+-----------------+
| Ada/Ed Radhames | ECON         | [address]              | [phone] |
|                |              | [place]  |                 |
|                |              |  [address]              |                 |
+----------------+--------------+---------------------+-----------------+
 
 
 
 
 Care Team Providers
 
 
+-----------------------+------+-------------+
| Care Team Member Name | Role | Phone       |
+-----------------------+------+-------------+
 PCP  | Unavailable |
+-----------------------+------+-------------+
 
 
 
 Encounter Details
 
 
+--------+-----------+----------------------+----------------------+-------------+
| Date   | Type      | Department           | Care Team            | Description |
+--------+-----------+----------------------+----------------------+-------------+
| / | [facility]  |   [facility] |   Marzena Moser  |             |
|    | Encounter |  MED CTR GENERIC OP  | M, DO  [address]      |             |
|        |           | CONV DEPT  [address]     | [address]      |             |
|        |           | [address]  [place], | [place], [place]      |             |
|        |           |  [place] [address]       | [address]  [phone]  |             |
|        |           | [phone]         |  [phone] (Fax)  |             |
+--------+-----------+----------------------+----------------------+-------------+
 
 
 
 Social History
 
 
+----------------+-------+-----------+--------+------+
| Tobacco Use    | Types | Packs/Day | Years  | Date |
|                |       |           | Used   |      |
+----------------+-------+-----------+--------+------+
| Never Assessed |       |           |        |      |
+----------------+-------+-----------+--------+------+
 
 
 
+------------------+---------------+
| Sex Assigned at  | Date Recorded |
| Birth            |               |
+------------------+---------------+
| Not on file      |               |
+------------------+---------------+
 
 
 
+----------------+-------------+-------------+
| Job Start Date | Occupation  | Industry    |
+----------------+-------------+-------------+
| Not on file    | Not on file | Not on file |
+----------------+-------------+-------------+
 
 
 
+----------------+--------------+------------+
| Travel History | Travel Start | Travel End |
+----------------+--------------+------------+
 
 
 
 
+-------------------------------------+
| No recent travel history available. |
+-------------------------------------+
 documented as of this encounter
 
 Plan of Treatment
 
 
+--------+-----------+------------+----------------------+-------------------+
| Date   | Type      | Specialty  | Care Team            | Description       |
+--------+-----------+------------+----------------------+-------------------+
| / | Office    | Cardiology |   Tonia Wilson,    |                   |
|    | Visit     |            | ARNJESSICA  [address]   |                   |
|        |           |            | St  [facility], [place]  |                   |
|        |           |            | [address]  [phone]  |                   |
|        |           |            |  [phone] (Fax)  |                   |
+--------+-----------+------------+----------------------+-------------------+
| / | Hospital  | Radiology  |   Popeye Townsend, |                   |
|    | Encounter |            |  MD  [address] |                   |
|        |           |            |  [place],   |                   |
|        |           |            | [place] [address]             |                   |
|        |           |            | [phone]         |                   |
|        |           |            | [phone] (Fax)   |                   |
+--------+-----------+------------+----------------------+-------------------+
| / | Surgery   | Radiology  |   Popeye Townsend, | CV EP PPM SYSTEM  |
|    |           |            |  MD  [address] | IMPLANT           |
|        |           |            |  [place],   |                   |
|        |           |            | [place] [address]             |                   |
|        |           |            | [phone]         |                   |
|        |           |            | [phone] (Fax)   |                   |
+--------+-----------+------------+----------------------+-------------------+
| 02/10/ | Clinical  | Cardiology |                      |                   |
|    | Support   |            |                      |                   |
+--------+-----------+------------+----------------------+-------------------+
| / | Office    | Cardiology |   Tonia Wilson,    |                   |
|    | Visit     |            | BELKIS  [address]   |                   |
|        |           |            | [place]  |                   |
|        |           |            | [address]  [phone]  |                   |
|        |           |            |  [phone] (Fax)  |                   |
+--------+-----------+------------+----------------------+-------------------+
| / | Off-Site  | Nephrology |   Marzena Moser  |                   |
|    | Visit     |            | DO ETIENNE  [address] [place] [address]      |                   |
|        |           |            | [address]      |                   |
|        |           |            | [place]      |                   |
|        |           |            | [address] [phone]  |                   |
|        |           |            |  [phone] (Fax)  |                   |
+--------+-----------+------------+----------------------+-------------------+
 documented as of this encounter
 
 Visit Diagnoses
 Not on filedocumented in this encounter

## 2020-01-08 NOTE — XMS
Encounter Summary
  Created on: 2020
 
 Viviana Ricci
 External Reference #: [id number]
 : 47
 Sex: Female
 
 Demographics
 
 
+-----------------------+----------------------+
| Address               | [address]      |
|                       | [place]  [address] |
+-----------------------+----------------------+
| Home Phone            | [phone]      |
+-----------------------+----------------------+
| Preferred Language    | Unknown              |
+-----------------------+----------------------+
| Marital Status        | Single               |
+-----------------------+----------------------+
| Yarsanism Affiliation | [id number]                 |
+-----------------------+----------------------+
| Race                  | Unknown              |
+-----------------------+----------------------+
| Ethnic Group          | Unknown              |
+-----------------------+----------------------+
 
 
 Author
 
 
+--------------+--------------------------------------------+
| Author       | [facility] [place]  |
|              | and [place]                                |
+--------------+--------------------------------------------+
| Organization | [facility] [place]  |
|              | and [place]                                |
+--------------+--------------------------------------------+
| Address      | Unknown                                    |
+--------------+--------------------------------------------+
| Phone        | Unavailable                                |
+--------------+--------------------------------------------+
 
 
 
 Support
 
 
+----------------+--------------+---------------------+-----------------+
| Name           | Relationship | Address             | Phone           |
+----------------+--------------+---------------------+-----------------+
| Ada/Ed Radhames | ECON         | [address]              | [phone] |
|                |              | [place]  |                 |
|                |              |  [address]              |                 |
+----------------+--------------+---------------------+-----------------+
 
 
 
 
 Care Team Providers
 
 
+-----------------------+------+-------------+
| Care Team Member Name | Role | Phone       |
+-----------------------+------+-------------+
 PCP  | Unavailable |
+-----------------------+------+-------------+
 
 
 
 Reason for Visit
 
 
+-------------------+----------+
| Reason            | Comments |
+-------------------+----------+
| Medication Refill |          |
+-------------------+----------+
 
 
 
 Encounter Details
 
 
+--------+--------+---------------------+----------------------+-------------------+
| Date   | Type   | Department          | Care Team            | Description       |
+--------+--------+---------------------+----------------------+-------------------+
| / | Refill |   [facility]         |   Marzena Moser  | Medication Refill |
| 2017   |        | NEPHROLOGY  [address]   | M, DO  [address]      |                   |
|        |        | [address]   | [address]      |                   |
|        |        | [place], [place]     | [place], [place]      |                   |
|        |        | [address]          | [address]  [phone]  |                   |
|        |        | [phone]        |  [phone] (Fax)  |                   |
+--------+--------+---------------------+----------------------+-------------------+
 
 
 
 Social History
 
 
+--------------+-------+-----------+--------+------+
| Tobacco Use  | Types | Packs/Day | Years  | Date |
|              |       |           | Used   |      |
+--------------+-------+-----------+--------+------+
| Never Smoker |       |           |        |      |
+--------------+-------+-----------+--------+------+
 
 
 
+---------------------+---+---+---+
| Smokeless Tobacco:  |   |   |   |
| Never Used          |   |   |   |
+---------------------+---+---+---+
 
 
 
+---------------------------------------------------------------+
| Comments: some second hand smoke exposure, but fairly minimal |
 
+---------------------------------------------------------------+
 
 
 
+-------------+-------------+---------+----------+
| Alcohol Use | Drinks/Week | oz/Week | Comments |
+-------------+-------------+---------+----------+
| No          |             |         |          |
+-------------+-------------+---------+----------+
 
 
 
+------------------+---------------+
| Sex Assigned at  | Date Recorded |
| Birth            |               |
+------------------+---------------+
| Not on file      |               |
+------------------+---------------+
 
 
 
+----------------+-------------+-------------+
| Job Start Date | Occupation  | Industry    |
+----------------+-------------+-------------+
| Not on file    | Not on file | Not on file |
+----------------+-------------+-------------+
 
 
 
+----------------+--------------+------------+
| Travel History | Travel Start | Travel End |
+----------------+--------------+------------+
 
 
 
+-------------------------------------+
| No recent travel history available. |
+-------------------------------------+
 documented as of this encounter
 
 Plan of Treatment
 
 
+--------+-----------+------------+----------------------+-------------------+
| Date   | Type      | Specialty  | Care Team            | Description       |
+--------+-----------+------------+----------------------+-------------------+
| / | Office    | Cardiology |   HellalfredoTonia,    |                   |
|    | Visit     |            | ARNP  [address]   |                   |
|        |           |            | St  [place], [place]  |                   |
|        |           |            | [address]  [phone]  |                   |
|        |           |            |  [phone] (Fax)  |                   |
+--------+-----------+------------+----------------------+-------------------+
| / | Hospital  | Radiology  |   Popeye Townsend, |                   |
|    | Encounter |            |  MD  [address] |                   |
|        |           |            |  St.  [place],   |                   |
|        |           |            | [place] [address]             |                   |
|        |           |            | [phone]         |                   |
|        |           |            | [phone] (Fax)   |                   |
+--------+-----------+------------+----------------------+-------------------+
| / | Surgery   | Radiology  |   Popeye Townsend, | CV EP PPM SYSTEM  |
 
|    |           |            |  MD  [address] | IMPLANT           |
|        |           |            |  St.  [place],   |                   |
|        |           |            | [place] [address]             |                   |
|        |           |            | [phone]         |                   |
|        |           |            | [phone] (Fax)   |                   |
+--------+-----------+------------+----------------------+-------------------+
| 02/10/ | Clinical  | Cardiology |                      |                   |
|    | Support   |            |                      |                   |
+--------+-----------+------------+----------------------+-------------------+
| / | Office    | Cardiology |   Tonia Wilson,    |                   |
|    | Visit     |            | ARNP  [address]   |                   |
|        |           |            | [place]  |                   |
|        |           |            | [address]  [phone]  |                   |
|        |           |            |  [phone] (Fax)  |                   |
+--------+-----------+------------+----------------------+-------------------+
| / | Off-Site  | Nephrology |   Marzena Moser  |                   |
|    | Visit     |            | DO ETIENNE  [address] [place] [address]      |                   |
|        |           |            | [address]      |                   |
|        |           |            | [place]      |                   |
|        |           |            | [address]  [phone]  |                   |
|        |           |            |  [phone] (Fax)  |                   |
+--------+-----------+------------+----------------------+-------------------+
 documented as of this encounter
 
 Visit Diagnoses
 Not on filedocumented in this encounter

## 2020-01-08 NOTE — XMS
Encounter Summary
  Created on: 2020
 
 Viviana Ricci
 External Reference #: [id number]
 : 47
 Sex: Female
 
 Demographics
 
 
+-----------------------+----------------------+
| Address               | [address]      |
|                       | [place]  [address] |
+-----------------------+----------------------+
| Home Phone            | [phone]      |
+-----------------------+----------------------+
| Preferred Language    | Unknown              |
+-----------------------+----------------------+
| Marital Status        | Single               |
+-----------------------+----------------------+
| Sabianism Affiliation | [id number]                 |
+-----------------------+----------------------+
| Race                  | Unknown              |
+-----------------------+----------------------+
| Ethnic Group          | Unknown              |
+-----------------------+----------------------+
 
 
 Author
 
 
+--------------+--------------------------------------------+
| Author       | [facility]  |
|              | and [facility]                                |
+--------------+--------------------------------------------+
| Organization | [facility]  |
|              | and [facility]                                |
+--------------+--------------------------------------------+
| Address      | Unknown                                    |
+--------------+--------------------------------------------+
| Phone        | Unavailable                                |
+--------------+--------------------------------------------+
 
 
 
 Support
 
 
+----------------+--------------+---------------------+-----------------+
| Name           | Relationship | Address             | Phone           |
+----------------+--------------+---------------------+-----------------+
| Ada/Ed Radhames | ECON         | [address]              | [phone] |
|                |              | [place]  |                 |
|                |              |  [address]              |                 |
+----------------+--------------+---------------------+-----------------+
 
 
 
 
 Care Team Providers
 
 
+------------------------+------+-----------------+
| Care Team Member Name  | Role | Phone           |
+------------------------+------+-----------------+
| Marzena Moser DO | PCP  | [phone] |
+------------------------+------+-----------------+
 
 
 
 Encounter Details
 
 
+--------+-------------+---------------------+----------------------+-------------+
| Date   | Type        | Department          | Care Team            | Description |
+--------+-------------+---------------------+----------------------+-------------+
| 10/31/ | Documentati |   [facility]         |   Marzena Moser  |             |
| 2019   | on          | NEPHROLOGY  [address]   | M, DO  [address]      |             |
|        |             | [address]   | [address]      |             |
|        |             | [place], [place]     | [place], [place]      |             |
|        |             | [address]          | [address]  [phone]  |             |
|        |             | [phone]        |  [phone] (Fax)  |             |
+--------+-------------+---------------------+----------------------+-------------+
 
 
 
 Social History
 
 
+--------------+-------+-----------+--------+------+
| Tobacco Use  | Types | Packs/Day | Years  | Date |
|              |       |           | Used   |      |
+--------------+-------+-----------+--------+------+
| Never Smoker |       |           |        |      |
+--------------+-------+-----------+--------+------+
 
 
 
+---------------------+---+---+---+
| Smokeless Tobacco:  |   |   |   |
| Never Used          |   |   |   |
+---------------------+---+---+---+
 
 
 
+---------------------------------------------------------------+
| Comments: some second hand smoke exposure, but fairly minimal |
+---------------------------------------------------------------+
 
 
 
+-------------+-------------+---------+----------+
| Alcohol Use | Drinks/Week | oz/Week | Comments |
+-------------+-------------+---------+----------+
| No          |             |         |          |
+-------------+-------------+---------+----------+
 
 
 
 
+------------------+---------------+
| Sex Assigned at  | Date Recorded |
| Birth            |               |
+------------------+---------------+
| Not on file      |               |
+------------------+---------------+
 
 
 
+----------------+-------------+-------------+
| Job Start Date | Occupation  | Industry    |
+----------------+-------------+-------------+
| Not on file    | Not on file | Not on file |
+----------------+-------------+-------------+
 
 
 
+----------------+--------------+------------+
| Travel History | Travel Start | Travel End |
+----------------+--------------+------------+
 
 
 
+-------------------------------------+
| No recent travel history available. |
+-------------------------------------+
 documented as of this encounter
 
 Progress Notes
 Maite Raygoza - 10/31/2019  4:57 PM PDTCertificate of Medical Necessity CMS-484 Oxygen. 
 Sent to scan.Electronically signed by Maite Raygoza at 10/31/2019  4:58 PM PDTdocumented 
in this encounter
 
 Plan of Treatment
 
 
+--------+-----------+------------+----------------------+-------------------+
| Date   | Type      | Specialty  | Care Team            | Description       |
+--------+-----------+------------+----------------------+-------------------+
| / | Office    | Cardiology |   Tonia Wilson,    |                   |
|    | Visit     |            | ARNJESSICA  [address]   |                   |
|        |           |            | St  [place], [place]  |                   |
|        |           |            | [address]  [phone]  |                   |
|        |           |            |  [phone] (Fax)  |                   |
+--------+-----------+------------+----------------------+-------------------+
| / | Hospital  | Radiology  |   Popeye Townsend, |                   |
|    | Encounter |            |  MD  [address] |                   |
|        |           |            |  St.  [place],   |                   |
|        |           |            | [place] [address]             |                   |
|        |           |            | [phone]         |                   |
|        |           |            | [phone] (Fax)   |                   |
+--------+-----------+------------+----------------------+-------------------+
| / | Surgery   | Radiology  |   Popeye Townsend, | CV EP PPM SYSTEM  |
|    |           |            |  MD  [address] | IMPLANT           |
|        |           |            |  St.  [place],   |                   |
|        |           |            | [place] [address]             |                   |
|        |           |            | [phone]         |                   |
|        |           |            | [phone] (Fax)   |                   |
+--------+-----------+------------+----------------------+-------------------+
 
| 02/10/ | Clinical  | Cardiology |                      |                   |
|    | Support   |            |                      |                   |
+--------+-----------+------------+----------------------+-------------------+
| / | Office    | Cardiology |   Tonia Wilson,    |                   |
|    | Visit     |            | POP  [address]   |                   |
|        |           |            | [place]  |                   |
|        |           |            | [address]  [phone]  |                   |
|        |           |            |  [phone] (Fax)  |                   |
+--------+-----------+------------+----------------------+-------------------+
| / | Off-Site  | Nephrology |   Marzena Moser  |                   |
|    | Visit     |            | DO ETIENNE  [address] [place] [address]      |                   |
|        |           |            | [address]      |                   |
|        |           |            | [place]      |                   |
|        |           |            | [address] [phone]  |                   |
|        |           |            |  [phone] (Fax)  |                   |
+--------+-----------+------------+----------------------+-------------------+
 documented as of this encounter
 
 Visit Diagnoses
 Not on filedocumented in this encounter

## 2020-01-08 NOTE — XMS
Encounter Summary
  Created on: 2020
 
 Viviana Ricci
 External Reference #: [id number]
 : 47
 Sex: Female
 
 Demographics
 
 
+-----------------------+----------------------+
| Address               | [address]      |
|                       | [place]  [address] |
+-----------------------+----------------------+
| Home Phone            | [phone]      |
+-----------------------+----------------------+
| Preferred Language    | Unknown              |
+-----------------------+----------------------+
| Marital Status        | Single               |
+-----------------------+----------------------+
| Yazidi Affiliation | [id number]                 |
+-----------------------+----------------------+
| Race                  | Unknown              |
+-----------------------+----------------------+
| Ethnic Group          | Unknown              |
+-----------------------+----------------------+
 
 
 Author
 
 
+--------------+--------------------------------------------+
| Author       | [facility]  |
|              | and [facility]                                |
+--------------+--------------------------------------------+
| Organization | [facility]  |
|              | and [facility]                                |
+--------------+--------------------------------------------+
| Address      | Unknown                                    |
+--------------+--------------------------------------------+
| Phone        | Unavailable                                |
+--------------+--------------------------------------------+
 
 
 
 Support
 
 
+----------------+--------------+---------------------+-----------------+
| Name           | Relationship | Address             | Phone           |
+----------------+--------------+---------------------+-----------------+
| Ada/Ed Radhames | ECON         | [address]              | [phone] |
|                |              | [place]  |                 |
|                |              |  [address]              |                 |
+----------------+--------------+---------------------+-----------------+
 
 
 
 
 Care Team Providers
 
 
+-----------------------+------+-------------+
| Care Team Member Name | Role | Phone       |
+-----------------------+------+-------------+
 PCP  | Unavailable |
+-----------------------+------+-------------+
 
 
 
 Reason for Visit
 
 
+-------------------+----------+
| Reason            | Comments |
+-------------------+----------+
| Medication Refill |          |
+-------------------+----------+
 
 
 
 Encounter Details
 
 
+--------+--------+---------------------+----------------------+-------------------+
| Date   | Type   | Department          | Care Team            | Description       |
+--------+--------+---------------------+----------------------+-------------------+
| / | Refill |   [facility]         |   Marzena Moser  | Medication Refill |
|    |        | NEPHROLOGY  [address]   | M, DO  [address]      |                   |
|        |        | [address]   | [address]      |                   |
|        |        | [place], [place]     | [place], [place]      |                   |
|        |        | [address]          | [address]  [phone]  |                   |
|        |        | [phone]        |  [phone] (Fax)  |                   |
+--------+--------+---------------------+----------------------+-------------------+
 
 
 
 Social History
 
 
+--------------+-------+-----------+--------+------+
| Tobacco Use  | Types | Packs/Day | Years  | Date |
|              |       |           | Used   |      |
+--------------+-------+-----------+--------+------+
| Never Smoker |       |           |        |      |
+--------------+-------+-----------+--------+------+
 
 
 
+---------------------+---+---+---+
| Smokeless Tobacco:  |   |   |   |
| Never Used          |   |   |   |
+---------------------+---+---+---+
 
 
 
+---------------------------------------------------------------+
| Comments: some second hand smoke exposure, but fairly minimal |
 
+---------------------------------------------------------------+
 
 
 
+-------------+-------------+---------+----------+
| Alcohol Use | Drinks/Week | oz/Week | Comments |
+-------------+-------------+---------+----------+
| No          |             |         |          |
+-------------+-------------+---------+----------+
 
 
 
+------------------+---------------+
| Sex Assigned at  | Date Recorded |
| Birth            |               |
+------------------+---------------+
| Not on file      |               |
+------------------+---------------+
 
 
 
+----------------+-------------+-------------+
| Job Start Date | Occupation  | Industry    |
+----------------+-------------+-------------+
| Not on file    | Not on file | Not on file |
+----------------+-------------+-------------+
 
 
 
+----------------+--------------+------------+
| Travel History | Travel Start | Travel End |
+----------------+--------------+------------+
 
 
 
+-------------------------------------+
| No recent travel history available. |
+-------------------------------------+
 documented as of this encounter
 
 Plan of Treatment
 
 
+--------+-----------+------------+----------------------+-------------------+
| Date   | Type      | Specialty  | Care Team            | Description       |
+--------+-----------+------------+----------------------+-------------------+
| / | Office    | Cardiology |   HellalfredoTonia,    |                   |
|    | Visit     |            | ARNP  [address]   |                   |
|        |           |            | St  [place], [place]  |                   |
|        |           |            | [address]  [phone]  |                   |
|        |           |            |  [phone] (Fax)  |                   |
+--------+-----------+------------+----------------------+-------------------+
| / | Hospital  | Radiology  |   Popeye Townsend, |                   |
|    | Encounter |            |  MD  [address] |                   |
|        |           |            |  St.  [place],   |                   |
|        |           |            | [place] [address]             |                   |
|        |           |            | [phone]         |                   |
|        |           |            | [phone] (Fax)   |                   |
+--------+-----------+------------+----------------------+-------------------+
| / | Surgery   | Radiology  |   Popeye Townsend, | CV EP PPM SYSTEM  |
 
|    |           |            |  MD  [address] | IMPLANT           |
|        |           |            |  St.  [place],   |                   |
|        |           |            | [place] [address]             |                   |
|        |           |            | [phone]         |                   |
|        |           |            | [phone] (Fax)   |                   |
+--------+-----------+------------+----------------------+-------------------+
| 02/10/ | Clinical  | Cardiology |                      |                   |
|    | Support   |            |                      |                   |
+--------+-----------+------------+----------------------+-------------------+
| / | Office    | Cardiology |   Tonia Wilson,    |                   |
|    | Visit     |            | ARNP  [address]   |                   |
|        |           |            | [place]  |                   |
|        |           |            | [address]  [phone]  |                   |
|        |           |            |  [phone] (Fax)  |                   |
+--------+-----------+------------+----------------------+-------------------+
| / | Off-Site  | Nephrology |   Marzena Moser  |                   |
|    | Visit     |            | DO ETIENNE  [address] [place] [address]      |                   |
|        |           |            | [address]      |                   |
|        |           |            | [place]      |                   |
|        |           |            | [address]  [phone]  |                   |
|        |           |            |  [phone] (Fax)  |                   |
+--------+-----------+------------+----------------------+-------------------+
 documented as of this encounter
 
 Visit Diagnoses
 
 
+-----------------------------------------------------------------------------------------+
| Diagnosis                                                                               |
+-----------------------------------------------------------------------------------------+
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
| stage IV, or unspecified - Primary                                                      |
+-----------------------------------------------------------------------------------------+
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
| membranous glomerulonephritis                                                           |
+-----------------------------------------------------------------------------------------+
|   Hyperlipidemia  Other and unspecified hyperlipidemia                                  |
+-----------------------------------------------------------------------------------------+
|   Complications of transplanted kidney                                                  |
+-----------------------------------------------------------------------------------------+
 documented in this encounter

## 2020-01-08 NOTE — XMS
Encounter Summary
  Created on: 2020
 
 Viviana Ricci
 External Reference #: [id number]
 : 47
 Sex: Female
 
 Demographics
 
 
+-----------------------+----------------------+
| Address               | [address]      |
|                       | [place]  [address] |
+-----------------------+----------------------+
| Home Phone            | [phone]      |
+-----------------------+----------------------+
| Preferred Language    | Unknown              |
+-----------------------+----------------------+
| Marital Status        | Single               |
+-----------------------+----------------------+
| Latter-day Affiliation | [id number]                 |
+-----------------------+----------------------+
| Race                  | Unknown              |
+-----------------------+----------------------+
| Ethnic Group          | Unknown              |
+-----------------------+----------------------+
 
 
 Author
 
 
+--------------+--------------------------------------------+
| Author       | [facility] [place]  |
|              | and [place]                                |
+--------------+--------------------------------------------+
| Organization | [facility] [place]  |
|              | and [place]                                |
+--------------+--------------------------------------------+
| Address      | Unknown                                    |
+--------------+--------------------------------------------+
| Phone        | Unavailable                                |
+--------------+--------------------------------------------+
 
 
 
 Support
 
 
+----------------+--------------+---------------------+-----------------+
| Name           | Relationship | Address             | Phone           |
+----------------+--------------+---------------------+-----------------+
| Ada/Ed Radhames | ECON         | [place]              | [phone] |
|                |              | [place], [place]  |                 |
|                |              |  [address]              |                 |
+----------------+--------------+---------------------+-----------------+
 
 
 
 
 Care Team Providers
 
 
+-----------------------+------+-------------+
| Care Team Member Name | Role | Phone       |
+-----------------------+------+-------------+
 PCP  | Unavailable |
+-----------------------+------+-------------+
 
 
 
 Encounter Details
 
 
+--------+-------------+---------------------+----------------------+-------------+
| Date   | Type        | Department          | Care Team            | Description |
+--------+-------------+---------------------+----------------------+-------------+
| / | Orders Only |   [facility]         |   Letty Hart  |             |
|    |             | NEPHROLOGY  [address]   | [address]                |             |
|        |             | [address]   |                      |             |
|        |             | [place]     |                      |             |
|        |             | [id number]          |                      |             |
|        |             | [phone]        |                      |             |
+--------+-------------+---------------------+----------------------+-------------+
 
 
 
 Social History
 
 
+--------------+-------+-----------+--------+------+
| Tobacco Use  | Types | Packs/Day | Years  | Date |
|              |       |           | Used   |      |
+--------------+-------+-----------+--------+------+
| Never Smoker |       |           |        |      |
+--------------+-------+-----------+--------+------+
 
 
 
+---------------------+---+---+---+
| Smokeless Tobacco:  |   |   |   |
| Never Used          |   |   |   |
+---------------------+---+---+---+
 
 
 
+---------------------------------------------------------------+
| Comments: some second hand smoke exposure, but fairly minimal |
+---------------------------------------------------------------+
 
 
 
+-------------+-------------+---------+----------+
| Alcohol Use | Drinks/Week | oz/Week | Comments |
+-------------+-------------+---------+----------+
| No          |             |         |          |
+-------------+-------------+---------+----------+
 
 
 
 
+------------------+---------------+
| Sex Assigned at  | Date Recorded |
| Birth            |               |
+------------------+---------------+
| Not on file      |               |
+------------------+---------------+
 
 
 
+----------------+-------------+-------------+
| Job Start Date | Occupation  | Industry    |
+----------------+-------------+-------------+
| Not on file    | Not on file | Not on file |
+----------------+-------------+-------------+
 
 
 
+----------------+--------------+------------+
| Travel History | Travel Start | Travel End |
+----------------+--------------+------------+
 
 
 
+-------------------------------------+
| No recent travel history available. |
+-------------------------------------+
 documented as of this encounter
 
 Plan of Treatment
 
 
+--------+-----------+------------+----------------------+-------------------+
| Date   | Type      | Specialty  | Care Team            | Description       |
+--------+-----------+------------+----------------------+-------------------+
| / | Office    | Cardiology |   Tonia Wilson,    |                   |
|    | Visit     |            | BELKIS  [address]   |                   |
|        |           |            | St  [place]  |                   |
|        |           |            | [address]  [phone]  |                   |
|        |           |            |  [phone] (Fax)  |                   |
+--------+-----------+------------+----------------------+-------------------+
| / | Hospital  | Radiology  |   Popeye Townsend, |                   |
|    | Encounter |            |  MD  [address] |                   |
|        |           |            |  St.  [place],   |                   |
|        |           |            | [place] [address]             |                   |
|        |           |            | [phone]         |                   |
|        |           |            | [phone] (Fax)   |                   |
+--------+-----------+------------+----------------------+-------------------+
| / | Surgery   | Radiology  |   Popeye Townsend, | CV EP PPM SYSTEM  |
|    |           |            |  MD  [address] | IMPLANT           |
|        |           |            |  St.  [place],   |                   |
|        |           |            | [place] [address]             |                   |
|        |           |            | [phone]         |                   |
|        |           |            | [phone] (Fax)   |                   |
+--------+-----------+------------+----------------------+-------------------+
| 02/10/ | Clinical  | Cardiology |                      |                   |
|    | Support   |            |                      |                   |
+--------+-----------+------------+----------------------+-------------------+
| / | Office    | Cardiology |   Tonia Wilson,    |                   |
|    | Visit     |            | ARNP  [address]   |                   |
 
|        |           |            | St  [place], [place]  |                   |
|        |           |            | [address]  [phone]  |                   |
|        |           |            |  [phone] (Fax)  |                   |
+--------+-----------+------------+----------------------+-------------------+
| / | Off-Site  | Nephrology |   Marzena Moser  |                   |
|    | Visit     |            | DO ETIENNE  [address] [place] [address]      |                   |
|        |           |            | [address]      |                   |
|        |           |            | [place]      |                   |
|        |           |            | [address] [phone]  |                   |
|        |           |            |  [phone] (Fax)  |                   |
+--------+-----------+------------+----------------------+-------------------+
 documented as of this encounter
 
 Visit Diagnoses
 Not on filedocumented in this encounter

## 2020-01-08 NOTE — XMS
Encounter Summary
  Created on: 2020
 
 Viviana Ricci
 External Reference #: [id number]
 : 47
 Sex: Female
 
 Demographics
 
 
+-----------------------+----------------------+
| Address               | [address]      |
|                       | [place]  [address] |
+-----------------------+----------------------+
| Home Phone            | [phone]      |
+-----------------------+----------------------+
| Preferred Language    | Unknown              |
+-----------------------+----------------------+
| Marital Status        | Single               |
+-----------------------+----------------------+
| Protestant Affiliation | 1009                 |
+-----------------------+----------------------+
| Race                  | Unknown              |
+-----------------------+----------------------+
| Ethnic Group          | Unknown              |
+-----------------------+----------------------+
 
 
 Author
 
 
+--------------+--------------------------------------------+
| Author       | [facility] [place]  |
|              | and [place]                                |
+--------------+--------------------------------------------+
| Organization | [facility] [place]  |
|              | and [place]                                |
+--------------+--------------------------------------------+
| Address      | Unknown                                    |
+--------------+--------------------------------------------+
| Phone        | Unavailable                                |
+--------------+--------------------------------------------+
 
 
 
 Support
 
 
+----------------+--------------+---------------------+-----------------+
| Name           | Relationship | Address             | Phone           |
+----------------+--------------+---------------------+-----------------+
| Ada/Ed Radhames | ECON         | [address]              | [phone] |
|                |              | [place]  |                 |
|                |              |  [address]              |                 |
+----------------+--------------+---------------------+-----------------+
 
 
 
 
 Care Team Providers
 
 
+-----------------------+------+-------------+
| Care Team Member Name | Role | Phone       |
+-----------------------+------+-------------+
 PCP  | Unavailable |
+-----------------------+------+-------------+
 
 
 
 Encounter Details
 
 
+--------+-----------+----------------------+----------------------+-------------+
| Date   | Type      | Department           | Care Team            | Description |
+--------+-----------+----------------------+----------------------+-------------+
| / | [facility]  |   [facility] |   Marzena Moser  |             |
|  - | Encounter |  MED CTR MED ONC     | M, DO  [address]      |             |
|        |           | [address]  | [place]      |             |
| / |           | [place] [address] | [place]      |             |
|    |           |   [phone]       | [address]  [phone]  |             |
|        |           |                      |  [phone] (Fax)  |             |
+--------+-----------+----------------------+----------------------+-------------+
 
 
 
 Social History
 
 
+----------------+-------+-----------+--------+------+
| Tobacco Use    | Types | Packs/Day | Years  | Date |
|                |       |           | Used   |      |
+----------------+-------+-----------+--------+------+
| Never Assessed |       |           |        |      |
+----------------+-------+-----------+--------+------+
 
 
 
+------------------+---------------+
| Sex Assigned at  | Date Recorded |
| Birth            |               |
+------------------+---------------+
| Not on file      |               |
+------------------+---------------+
 
 
 
+----------------+-------------+-------------+
| Job Start Date | Occupation  | Industry    |
+----------------+-------------+-------------+
| Not on file    | Not on file | Not on file |
+----------------+-------------+-------------+
 
 
 
+----------------+--------------+------------+
| Travel History | Travel Start | Travel End |
+----------------+--------------+------------+
 
 
 
 
+-------------------------------------+
| No recent travel history available. |
+-------------------------------------+
 documented as of this encounter
 
 Plan of Treatment
 
 
+--------+-----------+------------+----------------------+-------------------+
| Date   | Type      | Specialty  | Care Team            | Description       |
+--------+-----------+------------+----------------------+-------------------+
| / | Office    | Cardiology |   Tonia Wilson,    |                   |
|    | Visit     |            | ARNJESSICA  [address]   |                   |
|        |           |            | St  [place], [place]  |                   |
|        |           |            | [address]  [phone]  |                   |
|        |           |            |  [phone] (Fax)  |                   |
+--------+-----------+------------+----------------------+-------------------+
| / | Hospital  | Radiology  |   Popeye Townsend, |                   |
|    | Encounter |            |  MD  [address] |                   |
|        |           |            |  St.  [place],   |                   |
|        |           |            | [place] [address]             |                   |
|        |           |            | [phone]         |                   |
|        |           |            | [phone] (Fax)   |                   |
+--------+-----------+------------+----------------------+-------------------+
| / | Surgery   | Radiology  |   Popeye Townsend, | CV EP PPM SYSTEM  |
|    |           |            |  MD  [address] | IMPLANT           |
|        |           |            |  St.  [place],   |                   |
|        |           |            | [place] [address]             |                   |
|        |           |            | [phone]         |                   |
|        |           |            | [phone] (Fax)   |                   |
+--------+-----------+------------+----------------------+-------------------+
| 02/10/ | Clinical  | Cardiology |                      |                   |
|    | Support   |            |                      |                   |
+--------+-----------+------------+----------------------+-------------------+
| / | Office    | Cardiology |   Tonia Wilson,    |                   |
|    | Visit     |            | ARNP  [address]   |                   |
|        |           |            | [place]  |                   |
|        |           |            | [address]  [phone]  |                   |
|        |           |            |  [phone] (Fax)  |                   |
+--------+-----------+------------+----------------------+-------------------+
| / | Off-Site  | Nephrology |   Marzena Moesr  |                   |
|    | Visit     |            | DO ETIENNE  [address] [place] [address]      |                   |
|        |           |            | [address]      |                   |
|        |           |            | [place]      |                   |
|        |           |            | [address] [phone]  |                   |
|        |           |            |  [phone] (Fax)  |                   |
+--------+-----------+------------+----------------------+-------------------+
 documented as of this encounter
 
 Visit Diagnoses
 Not on filedocumented in this encounter

## 2020-01-08 NOTE — XMS
Encounter Summary
  Created on: 2020
 
 Viviana Ricci
 External Reference #: [id number]
 : 47
 Sex: Female
 
 Demographics
 
 
+-----------------------+----------------------+
| Address               | [address]      |
|                       | [place]  [address] |
+-----------------------+----------------------+
| Home Phone            | [phone]      |
+-----------------------+----------------------+
| Preferred Language    | Unknown              |
+-----------------------+----------------------+
| Marital Status        | Single               |
+-----------------------+----------------------+
| Sabianism Affiliation | [id number]                 |
+-----------------------+----------------------+
| Race                  | Unknown              |
+-----------------------+----------------------+
| Ethnic Group          | Unknown              |
+-----------------------+----------------------+
 
 
 Author
 
 
+--------------+--------------------------------------------+
| Author       | [facility]  |
|              | and [facility]                                |
+--------------+--------------------------------------------+
| Organization | [facility]  |
|              | and [facility]                                |
+--------------+--------------------------------------------+
| Address      | Unknown                                    |
+--------------+--------------------------------------------+
| Phone        | Unavailable                                |
+--------------+--------------------------------------------+
 
 
 
 Support
 
 
+----------------+--------------+---------------------+-----------------+
| Name           | Relationship | Address             | Phone           |
+----------------+--------------+---------------------+-----------------+
| Ada/Ed Radhames | ECON         | [place]              | [phone] |
|                |              | [place]  |                 |
|                |              |  [id number]              |                 |
+----------------+--------------+---------------------+-----------------+
 
 
 
 
 Care Team Providers
 
 
+-----------------------+------+-------------+
| Care Team Member Name | Role | Phone       |
+-----------------------+------+-------------+
 PCP  | Unavailable |
+-----------------------+------+-------------+
 
 
 
 Reason for Visit
 
 
+-------------------+----------+
| Reason            | Comments |
+-------------------+----------+
| Medication Refill |          |
+-------------------+----------+
 
 
 
 Encounter Details
 
 
+--------+--------+---------------------+----------------------+-------------------+
| Date   | Type   | Department          | Care Team            | Description       |
+--------+--------+---------------------+----------------------+-------------------+
| / | Refill |   [facility]         |   Marzena Moser  | Medication Refill |
| 2016   |        | NEPHROLOGY  [address]   | M, DO  [address]      |                   |
|        |        | [address]   | [address]      |                   |
|        |        | [place], [place]     | [place], [place]      |                   |
|        |        | [address]          | [address]  [phone]  |                   |
|        |        | [phone]        |  [phone] (Fax)  |                   |
+--------+--------+---------------------+----------------------+-------------------+
 
 
 
 Social History
 
 
+--------------+-------+-----------+--------+------+
| Tobacco Use  | Types | Packs/Day | Years  | Date |
|              |       |           | Used   |      |
+--------------+-------+-----------+--------+------+
| Never Smoker |       |           |        |      |
+--------------+-------+-----------+--------+------+
 
 
 
+---------------------+---+---+---+
| Smokeless Tobacco:  |   |   |   |
| Never Used          |   |   |   |
+---------------------+---+---+---+
 
 
 
+---------------------------------------------------------------+
| Comments: some second hand smoke exposure, but fairly minimal |
 
+---------------------------------------------------------------+
 
 
 
+-------------+-------------+---------+----------+
| Alcohol Use | Drinks/Week | oz/Week | Comments |
+-------------+-------------+---------+----------+
| No          |             |         |          |
+-------------+-------------+---------+----------+
 
 
 
+------------------+---------------+
| Sex Assigned at  | Date Recorded |
| Birth            |               |
+------------------+---------------+
| Not on file      |               |
+------------------+---------------+
 
 
 
+----------------+-------------+-------------+
| Job Start Date | Occupation  | Industry    |
+----------------+-------------+-------------+
| Not on file    | Not on file | Not on file |
+----------------+-------------+-------------+
 
 
 
+----------------+--------------+------------+
| Travel History | Travel Start | Travel End |
+----------------+--------------+------------+
 
 
 
+-------------------------------------+
| No recent travel history available. |
+-------------------------------------+
 documented as of this encounter
 
 Plan of Treatment
 
 
+--------+-----------+------------+----------------------+-------------------+
| Date   | Type      | Specialty  | Care Team            | Description       |
+--------+-----------+------------+----------------------+-------------------+
| / | Office    | Cardiology |   HellalfredoTonia,    |                   |
|    | Visit     |            | ARNP  [address]   |                   |
|        |           |            | St  [place], [place]  |                   |
|        |           |            | [address]  [phone]  |                   |
|        |           |            |  [phone] (Fax)  |                   |
+--------+-----------+------------+----------------------+-------------------+
| / | Hospital  | Radiology  |   Popeye Townsend, |                   |
|    | Encounter |            |  MD  [address] |                   |
|        |           |            |  St.  [place],   |                   |
|        |           |            | [place] [address]             |                   |
|        |           |            | [phone]         |                   |
|        |           |            | [phone] (Fax)   |                   |
+--------+-----------+------------+----------------------+-------------------+
| / | Surgery   | Radiology  |   Popeye Townsend, | CV EP PPM SYSTEM  |
 
|    |           |            |  MD  [address] | IMPLANT           |
|        |           |            |  St.  [place],   |                   |
|        |           |            | [place] [address]             |                   |
|        |           |            | [phone]         |                   |
|        |           |            | [phone] (Fax)   |                   |
+--------+-----------+------------+----------------------+-------------------+
| 02/10/ | Clinical  | Cardiology |                      |                   |
|    | Support   |            |                      |                   |
+--------+-----------+------------+----------------------+-------------------+
| / | Office    | Cardiology |   Tonia Wilson,    |                   |
|    | Visit     |            | ARNP  [address]   |                   |
|        |           |            | [place]  |                   |
|        |           |            | [address]  [phone]  |                   |
|        |           |            |  [phone] (Fax)  |                   |
+--------+-----------+------------+----------------------+-------------------+
| / | Off-Site  | Nephrology |   Marzena Moser  |                   |
|    | Visit     |            | DO ETIENNE  [address] [place] [address]      |                   |
|        |           |            | [address]      |                   |
|        |           |            | [place]      |                   |
|        |           |            | [address]  [phone]  |                   |
|        |           |            |  [phone] (Fax)  |                   |
+--------+-----------+------------+----------------------+-------------------+
 documented as of this encounter
 
 Visit Diagnoses
 
 
+----------------------------------------------------------------------------------------+
| Diagnosis                                                                              |
+----------------------------------------------------------------------------------------+
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
| left (HCC) - Primary                                                                   |
+----------------------------------------------------------------------------------------+
 documented in this encounter

## 2020-01-08 NOTE — XMS
Encounter Summary
  Created on: 2020
 
 Viviana Ricci
 External Reference #: [id number]
 : 47
 Sex: Female
 
 Demographics
 
 
+-----------------------+----------------------+
| Address               | [address]      |
|                       | [place]  [address] |
+-----------------------+----------------------+
| Home Phone            | [phone]      |
+-----------------------+----------------------+
| Preferred Language    | Unknown              |
+-----------------------+----------------------+
| Marital Status        | Single               |
+-----------------------+----------------------+
| Congregation Affiliation | 1009                 |
+-----------------------+----------------------+
| Race                  | Unknown              |
+-----------------------+----------------------+
| Ethnic Group          | Unknown              |
+-----------------------+----------------------+
 
 
 Author
 
 
+--------------+--------------------------------------------+
| Author       | [facility]  |
|              | and [facility]                                |
+--------------+--------------------------------------------+
| Organization | [facility]  |
|              | and [facility]                                |
+--------------+--------------------------------------------+
| Address      | Unknown                                    |
+--------------+--------------------------------------------+
| Phone        | Unavailable                                |
+--------------+--------------------------------------------+
 
 
 
 Support
 
 
+----------------+--------------+---------------------+-----------------+
| Name           | Relationship | Address             | Phone           |
+----------------+--------------+---------------------+-----------------+
| Ada/Ed Radhames | ECON         | [address]              | [phone] |
|                |              | [place]  |                 |
|                |              |  [address]              |                 |
+----------------+--------------+---------------------+-----------------+
 
 
 
 
 Care Team Providers
 
 
+-----------------------+------+-------------+
| Care Team Member Name | Role | Phone       |
+-----------------------+------+-------------+
 PCP  | Unavailable |
+-----------------------+------+-------------+
 
 
 
 Encounter Details
 
 
+--------+----------+---------------------+----------------------+-------------+
| Date   | Type     | Department          | Care Team            | Description |
+--------+----------+---------------------+----------------------+-------------+
| / | Abstract |   [facility] [place]         |   Marzena Moser  |             |
|    |          | NEPHROLOGY  [address]   | M, DO  [address]      |             |
|        |          | [address]   | [address]      |             |
|        |          | [place], [place]     | [place]      |             |
|        |          | [address]          | [address]  [phone]  |             |
|        |          | [phone]        |  [phone] (Fax)  |             |
+--------+----------+---------------------+----------------------+-------------+
 
 
 
 Social History
 
 
+--------------+-------+-----------+--------+------+
| Tobacco Use  | Types | Packs/Day | Years  | Date |
|              |       |           | Used   |      |
+--------------+-------+-----------+--------+------+
| Never Smoker |       |           |        |      |
+--------------+-------+-----------+--------+------+
 
 
 
+---------------------+---+---+---+
| Smokeless Tobacco:  |   |   |   |
| Never Used          |   |   |   |
+---------------------+---+---+---+
 
 
 
+---------------------------------------------------------------+
| Comments: some second hand smoke exposure, but fairly minimal |
+---------------------------------------------------------------+
 
 
 
+-------------+-------------+---------+----------+
| Alcohol Use | Drinks/Week | oz/Week | Comments |
+-------------+-------------+---------+----------+
| No          |             |         |          |
+-------------+-------------+---------+----------+
 
 
 
 
+------------------+---------------+
| Sex Assigned at  | Date Recorded |
| Birth            |               |
+------------------+---------------+
| Not on file      |               |
+------------------+---------------+
 
 
 
+----------------+-------------+-------------+
| Job Start Date | Occupation  | Industry    |
+----------------+-------------+-------------+
| Not on file    | Not on file | Not on file |
+----------------+-------------+-------------+
 
 
 
+----------------+--------------+------------+
| Travel History | Travel Start | Travel End |
+----------------+--------------+------------+
 
 
 
+-------------------------------------+
| No recent travel history available. |
+-------------------------------------+
 documented as of this encounter
 
 Plan of Treatment
 
 
+--------+-----------+------------+----------------------+-------------------+
| Date   | Type      | Specialty  | Care Team            | Description       |
+--------+-----------+------------+----------------------+-------------------+
| / | Office    | Cardiology |   Tonia Wilson,    |                   |
|    | Visit     |            | ARNJESSICA  [address]   |                   |
|        |           |            | [place]  |                   |
|        |           |            | [address]  [phone]  |                   |
|        |           |            |  [phone] (Fax)  |                   |
+--------+-----------+------------+----------------------+-------------------+
| / | Hospital  | Radiology  |   Popeye Townsend, |                   |
|    | Encounter |            |  MD  [address] |                   |
|        |           |            |  St.  [place],   |                   |
|        |           |            | [place] [address]             |                   |
|        |           |            | [phone]         |                   |
|        |           |            | [phone] (Fax)   |                   |
+--------+-----------+------------+----------------------+-------------------+
| / | Surgery   | Radiology  |   Popeye Townsend, | CV EP PPM SYSTEM  |
|    |           |            |  MD  [address] | IMPLANT           |
|        |           |            |  St.  [place],   |                   |
|        |           |            | [place] [address]             |                   |
|        |           |            | [phone]         |                   |
|        |           |            | [phone] (Fax)   |                   |
+--------+-----------+------------+----------------------+-------------------+
| 02/10/ | Clinical  | Cardiology |                      |                   |
|    | Support   |            |                      |                   |
+--------+-----------+------------+----------------------+-------------------+
| / | Office    | Cardiology |   Tonia Wilson,    |                   |
|    | Visit     |            | ARNP  [address]   |                   |
 
|        |           |            | St  [place], [place]  |                   |
|        |           |            | [address]  [phone]  |                   |
|        |           |            |  [phone] (Fax)  |                   |
+--------+-----------+------------+----------------------+-------------------+
| / | Off-Site  | Nephrology |   Marzena Moser  |                   |
| 2020   | Visit     |            | DO ETIENNE  [address] [place] [address]      |                   |
|        |           |            | [address]      |                   |
|        |           |            | [place]      |                   |
|        |           |            | [address]  [phone]  |                   |
|        |           |            |  [phone] (Fax)  |                   |
+--------+-----------+------------+----------------------+-------------------+
 documented as of this encounter
 
 Procedures
 
 
+----------------+--------+------------+----------------------+----------------------+
| Procedure Name | Priori | Date/Time  | Associated Diagnosis | Comments             |
|                | ty     |            |                      |                      |
+----------------+--------+------------+----------------------+----------------------+
| EXTERNAL LAB:  | Routin | 2015 |                      |   Results for this   |
| URINALYSIS     | e      |            |                      | procedure are in the |
|                |        |            |                      |  results section.    |
+----------------+--------+------------+----------------------+----------------------+
| URINALYSIS     | Routin | 2015 |                      |   Results for this   |
|                | e      |            |                      | procedure are in the |
|                |        |            |                      |  results section.    |
+----------------+--------+------------+----------------------+----------------------+
 documented in this encounter
 
 Results
 Urinalysis (2015)
 
+-------------+--------------+---------------+-------------+--------------+
| Component   | Value        | Ref Range     | Performed   | Pathologist  |
|             |              |               | At          | Signature    |
+-------------+--------------+---------------+-------------+--------------+
| Color,      | Sisi        |               | [facility]  |              |
| Urine       |              |               | [facility]    |              |
|             |              |               | [facility]     |              |
|             |              |               | [facility] -    |              |
|             |              |               | LABORATORY  |              |
+-------------+--------------+---------------+-------------+--------------+
| Clarity     | Hazy         |               | [facility]  |              |
|             |              |               | [facility]    |              |
|             |              |               | [facility]     |              |
|             |              |               | [facility] -    |              |
|             |              |               | LABORATORY  |              |
+-------------+--------------+---------------+-------------+--------------+
| WBC UA      | 50           | /HPF          | [facility]  |              |
|             |              |               | [facility]    |              |
|             |              |               | [facility]     |              |
|             |              |               | [facility] -    |              |
|             |              |               | LABORATORY  |              |
+-------------+--------------+---------------+-------------+--------------+
| BACTERIA UA | 1+ (A)       | Negative /HPF | [facility]  |              |
|             |              |               | [facility]    |              |
|             |              |               | [facility]     |              |
|             |              |               | [facility] -    |              |
|             |              |               | LABORATORY  |              |
 
+-------------+--------------+---------------+-------------+--------------+
| Nitrite,    | Positive (A) | Negative      | [facility]  |              |
| Urine       |              |               | [facility]    |              |
|             |              |               | [facility]     |              |
|             |              |               | [facility] -    |              |
|             |              |               | LABORATORY  |              |
+-------------+--------------+---------------+-------------+--------------+
 
 
 
+-----------------+
| Specimen        |
+-----------------+
| Urine specimen  |
| (specimen)      |
+-----------------+
 
 
 
+----------------------+--------------------+--------------------+----------------+
| Performing           | Address            | City/State/Zipcode | Phone Number   |
| Organization         |                    |                    |                |
+----------------------+--------------------+--------------------+----------------+
|   [facility]     |   [address] | [place]    |   [phone] |
| [facility]  |                    | [address]              |                |
| - LABORATORY         |                    |                    |                |
+----------------------+--------------------+--------------------+----------------+
 External Lab: Urinalysis (2015)
 
+-------------+----------+-----------+------------+--------------+
| Component   | Value    | Ref Range | Performed  | Pathologist  |
|             |          |           | At         | Signature    |
+-------------+----------+-----------+------------+--------------+
| UA Blood,   | negative |           | EXTERNAL   |              |
| External    |          |           | LAB        |              |
+-------------+----------+-----------+------------+--------------+
| UA Glucose, | normal   |           | EXTERNAL   |              |
|  External   |          |           | LAB        |              |
+-------------+----------+-----------+------------+--------------+
| UA Ketones, | negative |           | EXTERNAL   |              |
|  External   |          |           | LAB        |              |
+-------------+----------+-----------+------------+--------------+
| UA Ph,      | 6        |           | EXTERNAL   |              |
| External    |          |           | LAB        |              |
+-------------+----------+-----------+------------+--------------+
| UA          | 150 (A)  | 0         | EXTERNAL   |              |
| Proteins,   |          |           | LAB        |              |
| External    |          |           |            |              |
+-------------+----------+-----------+------------+--------------+
| UA RBC,     | 5        |           | EXTERNAL   |              |
| External    |          |           | LAB        |              |
+-------------+----------+-----------+------------+--------------+
| UA Specific | 1.016    |           | EXTERNAL   |              |
|  Gravity,   |          |           | LAB        |              |
| External    |          |           |            |              |
+-------------+----------+-----------+------------+--------------+
| UA          | 500 (A)  | 0         | EXTERNAL   |              |
| Leukocyte   |          |           | LAB        |              |
| Esterase,   |          |           |            |              |
| External    |          |           |            |              |
 
+-------------+----------+-----------+------------+--------------+
 
 
 
+----------------+---------+--------------------+--------------+
| Performing     | Address | City/State/Zipcode | Phone Number |
| Organization   |         |                    |              |
+----------------+---------+--------------------+--------------+
|   EXTERNAL LAB |         |                    |              |
+----------------+---------+--------------------+--------------+
 documented in this encounter
 
 Visit Diagnoses
 Not on filedocumented in this encounter

## 2020-01-08 NOTE — XMS
Encounter Summary
  Created on: 2020
 
 Viviana Ricci
 External Reference #: [id number]
 : 47
 Sex: Female
 
 Demographics
 
 
+-----------------------+----------------------+
| Address               | [address]      |
|                       | [place]  [address] |
+-----------------------+----------------------+
| Home Phone            | [phone]      |
+-----------------------+----------------------+
| Preferred Language    | Unknown              |
+-----------------------+----------------------+
| Marital Status        | Single               |
+-----------------------+----------------------+
| [employer]ology Affiliation | [id number]                 |
+-----------------------+----------------------+
| Race                  | Unknown              |
+-----------------------+----------------------+
| Ethnic Group          | Unknown              |
+-----------------------+----------------------+
 
 
 Author
 
 
+--------------+--------------------------------------------+
| Author       | [facility]  |
|              | and [place]                                |
+--------------+--------------------------------------------+
| Organization | [facility]  |
|              | and [place]                                |
+--------------+--------------------------------------------+
| Address      | Unknown                                    |
+--------------+--------------------------------------------+
| Phone        | Unavailable                                |
+--------------+--------------------------------------------+
 
 
 
 Support
 
 
+----------------+--------------+---------------------+-----------------+
| Name           | Relationship | Address             | Phone           |
+----------------+--------------+---------------------+-----------------+
| Ada/Ed Radhames | ECON         | [place]              | [phone] |
|                |              | [place]  |                 |
|                |              |  [address]              |                 |
+----------------+--------------+---------------------+-----------------+
 
 
 
 
 Care Team Providers
 
 
+-----------------------+------+-------------+
| Care Team Member Name | Role | Phone       |
+-----------------------+------+-------------+
 PCP  | Unavailable |
+-----------------------+------+-------------+
 
 
 
 Encounter Details
 
 
+--------+----------+---------------------+----------------------+-------------+
| Date   | Type     | Department          | Care Team            | Description |
+--------+----------+---------------------+----------------------+-------------+
| / | Abstract |   [facility] [place]         |   Marzena Moser  |             |
|    |          | NEPHROLOGY  [address]   | M, DO  [address]      |             |
|        |          | [address]   | [address]      |             |
|        |          | [place], [place]     | [place]      |             |
|        |          | [address]          | [address]  [phone]  |             |
|        |          | [phone]        |  [phone] (Fax)  |             |
+--------+----------+---------------------+----------------------+-------------+
 
 
 
 Social History
 
 
+--------------+-------+-----------+--------+------+
| Tobacco Use  | Types | Packs/Day | Years  | Date |
|              |       |           | Used   |      |
+--------------+-------+-----------+--------+------+
| Never Smoker |       |           |        |      |
+--------------+-------+-----------+--------+------+
 
 
 
+---------------------+---+---+---+
| Smokeless Tobacco:  |   |   |   |
| Never Used          |   |   |   |
+---------------------+---+---+---+
 
 
 
+---------------------------------------------------------------+
| Comments: some second hand smoke exposure, but fairly minimal |
+---------------------------------------------------------------+
 
 
 
+-------------+-------------+---------+----------+
| Alcohol Use | Drinks/Week | oz/Week | Comments |
+-------------+-------------+---------+----------+
| No          |             |         |          |
+-------------+-------------+---------+----------+
 
 
 
 
+------------------+---------------+
| Sex Assigned at  | Date Recorded |
| Birth            |               |
+------------------+---------------+
| Not on file      |               |
+------------------+---------------+
 
 
 
+----------------+-------------+-------------+
| Job Start Date | Occupation  | Industry    |
+----------------+-------------+-------------+
| Not on file    | Not on file | Not on file |
+----------------+-------------+-------------+
 
 
 
+----------------+--------------+------------+
| Travel History | Travel Start | Travel End |
+----------------+--------------+------------+
 
 
 
+-------------------------------------+
| No recent travel history available. |
+-------------------------------------+
 documented as of this encounter
 
 Plan of Treatment
 
 
+--------+-----------+------------+----------------------+-------------------+
| Date   | Type      | Specialty  | Care Team            | Description       |
+--------+-----------+------------+----------------------+-------------------+
| / | Office    | Cardiology |   Tonia Wilson,    |                   |
|    | Visit     |            | ARNJESSICA  [address]   |                   |
|        |           |            | [place]  |                   |
|        |           |            | [address]  [phone]  |                   |
|        |           |            |  [phone] (Fax)  |                   |
+--------+-----------+------------+----------------------+-------------------+
| / | Hospital  | Radiology  |   Popeye Townsend, |                   |
|    | Encounter |            |  MD  [address] |                   |
|        |           |            |  St.  [place],   |                   |
|        |           |            | [place] [address]             |                   |
|        |           |            | [phone]         |                   |
|        |           |            | [phone] (Fax)   |                   |
+--------+-----------+------------+----------------------+-------------------+
| / | Surgery   | Radiology  |   Popeye Townsend, | CV EP PPM SYSTEM  |
|    |           |            |  MD  [address] | IMPLANT           |
|        |           |            |  St.  [place],   |                   |
|        |           |            | [place] [address]             |                   |
|        |           |            | [phone]         |                   |
|        |           |            | [phone] (Fax)   |                   |
+--------+-----------+------------+----------------------+-------------------+
| 02/10/ | Clinical  | Cardiology |                      |                   |
|    | Support   |            |                      |                   |
+--------+-----------+------------+----------------------+-------------------+
| / | Office    | Cardiology |   Tonia Wilson,    |                   |
|    | Visit     |            | ARNP  [address]   |                   |
 
|        |           |            | St  [place], [place]  |                   |
|        |           |            | [address]  [phone]  |                   |
|        |           |            |  [phone] (Fax)  |                   |
+--------+-----------+------------+----------------------+-------------------+
| / | Off-Site  | Nephrology |   Marzena Moser  |                   |
|    | Visit     |            | DO ETIENNE  [address] [place] [address]      |                   |
|        |           |            | [address]      |                   |
|        |           |            | [place]      |                   |
|        |           |            | [address]  [phone]  |                   |
|        |           |            |  [phone] (Fax)  |                   |
+--------+-----------+------------+----------------------+-------------------+
 documented as of this encounter
 
 Procedures
 
 
+------------------+--------+------------+----------------------+----------------------+
| Procedure Name   | Priori | Date/Time  | Associated Diagnosis | Comments             |
|                  | ty     |            |                      |                      |
+------------------+--------+------------+----------------------+----------------------+
| EXTERNAL LAB:    | Routin | 2018 |                      |   Results for this   |
| URINALYSIS       | e      |            |                      | procedure are in the |
|                  |        |            |                      |  results section.    |
+------------------+--------+------------+----------------------+----------------------+
| URINALYSIS WITH  | Routin | 2018 |                      |   Results for this   |
| MICROSCOPIC      | e      |            |                      | procedure are in the |
|                  |        |            |                      |  results section.    |
+------------------+--------+------------+----------------------+----------------------+
 documented in this encounter
 
 Results
 Urinalysis With Microscopic (2018)
 
+-------------+----------+----------------+------------+--------------+
| Component   | Value    | Ref Range      | Performed  | Pathologist  |
|             |          |                | At         | Signature    |
+-------------+----------+----------------+------------+--------------+
| WBC UA      | 15       | /HPF           |            |              |
+-------------+----------+----------------+------------+--------------+
| Color,      | Yellow   | Light Yellow,  |            |              |
| Urine       |          | Yellow         |            |              |
+-------------+----------+----------------+------------+--------------+
| Clarity     | Clear    |                |            |              |
+-------------+----------+----------------+------------+--------------+
| BACTERIA UA | 1+ (A)   | Negative /HPF  |            |              |
+-------------+----------+----------------+------------+--------------+
| SQUAMOUS    | 1        | /HPF           |            |              |
| EPITHELIAL  |          |                |            |              |
| UA          |          |                |            |              |
+-------------+----------+----------------+------------+--------------+
| Nitrite,    | Negative | Negative       |            |              |
| Urine       |          |                |            |              |
+-------------+----------+----------------+------------+--------------+
 
 
 
+----------+
| Specimen |
+----------+
| Urine    |
 
+----------+
 External Lab: Urinalysis (2018)
 
+-------------+----------+-----------+------------+--------------+
| Component   | Value    | Ref Range | Performed  | Pathologist  |
|             |          |           | At         | Signature    |
+-------------+----------+-----------+------------+--------------+
| UA Blood,   | negative |           | EXTERNAL   |              |
| External    |          |           | LAB        |              |
+-------------+----------+-----------+------------+--------------+
| UA Glucose, | normal   |           | EXTERNAL   |              |
|  External   |          |           | LAB        |              |
+-------------+----------+-----------+------------+--------------+
| UA Ketones, | negative |           | EXTERNAL   |              |
|  External   |          |           | LAB        |              |
+-------------+----------+-----------+------------+--------------+
| UA Ph,      | 5        |           | EXTERNAL   |              |
| External    |          |           | LAB        |              |
+-------------+----------+-----------+------------+--------------+
| UA          | 100      |           | EXTERNAL   |              |
| Proteins,   |          |           | LAB        |              |
| External    |          |           |            |              |
+-------------+----------+-----------+------------+--------------+
| UA RBC,     | 2        |           | EXTERNAL   |              |
| External    |          |           | LAB        |              |
+-------------+----------+-----------+------------+--------------+
| UA Specific | 1.013    |           | EXTERNAL   |              |
|  Gravity,   |          |           | LAB        |              |
| External    |          |           |            |              |
+-------------+----------+-----------+------------+--------------+
| UA          | trace    |           | EXTERNAL   |              |
| Leukocyte   |          |           | LAB        |              |
| Esterase,   |          |           |            |              |
| External    |          |           |            |              |
+-------------+----------+-----------+------------+--------------+
 
 
 
+----------------+---------+--------------------+--------------+
| Performing     | Address | City/State/Zipcode | Phone Number |
| Organization   |         |                    |              |
+----------------+---------+--------------------+--------------+
|   EXTERNAL LAB |         |                    |              |
+----------------+---------+--------------------+--------------+
 documented in this encounter
 
 Visit Diagnoses
 Not on filedocumented in this encounter

## 2020-01-08 NOTE — XMS
Encounter Summary
  Created on: 2020
 
 Viviana Ricci
 External Reference #: [id number]
 : 47
 Sex: Female
 
 Demographics
 
 
+-----------------------+----------------------+
| Address               | [address]      |
|                       | [place]  [address] |
+-----------------------+----------------------+
| Home Phone            | [phone]      |
+-----------------------+----------------------+
| Preferred Language    | Unknown              |
+-----------------------+----------------------+
| Marital Status        | Single               |
+-----------------------+----------------------+
| [employer] Affiliation | [id number]                 |
+-----------------------+----------------------+
| Race                  | Unknown              |
+-----------------------+----------------------+
| Ethnic Group          | Unknown              |
+-----------------------+----------------------+
 
 
 Author
 
 
+--------------+--------------------------------------------+
| Author       | [facility]  |
|              | and [place]                                |
+--------------+--------------------------------------------+
| Organization | [facility]  |
|              | and [place]                                |
+--------------+--------------------------------------------+
| Address      | Unknown                                    |
+--------------+--------------------------------------------+
| Phone        | Unavailable                                |
+--------------+--------------------------------------------+
 
 
 
 Support
 
 
+----------------+--------------+---------------------+-----------------+
| Name           | Relationship | Address             | Phone           |
+----------------+--------------+---------------------+-----------------+
| Ada/Ed Radhames | ECON         | [place]              | [phone] |
|                |              | [place]  |                 |
|                |              |  [address]              |                 |
+----------------+--------------+---------------------+-----------------+
 
 
 
 
 Care Team Providers
 
 
+-----------------------+------+-------------+
| Care Team Member Name | Role | Phone       |
+-----------------------+------+-------------+
 PCP  | Unavailable |
+-----------------------+------+-------------+
 
 
 
 Reason for Visit
 
 
+-------------------+----------+
| Reason            | Comments |
+-------------------+----------+
| Medication Refill |          |
+-------------------+----------+
 
 
 
 Encounter Details
 
 
+--------+--------+---------------------+----------------------+-------------------+
| Date   | Type   | Department          | Care Team            | Description       |
+--------+--------+---------------------+----------------------+-------------------+
| / | Refill |   [place]         |   Marzena Moser  | Medication Refill |
|    |        | NEPHROLOGY  [address]   | M, DO  [address]      |                   |
|        |        | [address]   | [address]      |                   |
|        |        | [place], [place]     | [place], [place]      |                   |
|        |        | [address]          | [address]  [phone]  |                   |
|        |        | [phone]        |  [phone] (Fax)  |                   |
+--------+--------+---------------------+----------------------+-------------------+
 
 
 
 Social History
 
 
+--------------+-------+-----------+--------+------+
| Tobacco Use  | Types | Packs/Day | Years  | Date |
|              |       |           | Used   |      |
+--------------+-------+-----------+--------+------+
| Never Smoker |       |           |        |      |
+--------------+-------+-----------+--------+------+
 
 
 
+---------------------+---+---+---+
| Smokeless Tobacco:  |   |   |   |
| Never Used          |   |   |   |
+---------------------+---+---+---+
 
 
 
+---------------------------------------------------------------+
| Comments: some second hand smoke exposure, but fairly minimal |
 
+---------------------------------------------------------------+
 
 
 
+-------------+-------------+---------+----------+
| Alcohol Use | Drinks/Week | oz/Week | Comments |
+-------------+-------------+---------+----------+
| No          |             |         |          |
+-------------+-------------+---------+----------+
 
 
 
+------------------+---------------+
| Sex Assigned at  | Date Recorded |
| Birth            |               |
+------------------+---------------+
| Not on file      |               |
+------------------+---------------+
 
 
 
+----------------+-------------+-------------+
| Job Start Date | Occupation  | Industry    |
+----------------+-------------+-------------+
| Not on file    | Not on file | Not on file |
+----------------+-------------+-------------+
 
 
 
+----------------+--------------+------------+
| Travel History | Travel Start | Travel End |
+----------------+--------------+------------+
 
 
 
+-------------------------------------+
| No recent travel history available. |
+-------------------------------------+
 documented as of this encounter
 
 Plan of Treatment
 
 
+--------+-----------+------------+----------------------+-------------------+
| Date   | Type      | Specialty  | Care Team            | Description       |
+--------+-----------+------------+----------------------+-------------------+
| / | Office    | Cardiology |   HellalfredoTonia,    |                   |
|    | Visit     |            | ARNP  [address]   |                   |
|        |           |            | St  [place], [place]  |                   |
|        |           |            | [address]  [phone]  |                   |
|        |           |            |  [phone] (Fax)  |                   |
+--------+-----------+------------+----------------------+-------------------+
| / | Hospital  | Radiology  |   Popeye Townsend, |                   |
|    | Encounter |            |  MD  [address] |                   |
|        |           |            |  St.  [place],   |                   |
|        |           |            | [place] [address]             |                   |
|        |           |            | [phone]         |                   |
|        |           |            | [phone] (Fax)   |                   |
+--------+-----------+------------+----------------------+-------------------+
| / | Surgery   | Radiology  |   Popeye Townsend, | CV EP PPM SYSTEM  |
 
|    |           |            |  MD  [address] | IMPLANT           |
|        |           |            |  St.  [place],   |                   |
|        |           |            | [place] [address]             |                   |
|        |           |            | [phone]         |                   |
|        |           |            | [phone] (Fax)   |                   |
+--------+-----------+------------+----------------------+-------------------+
| 02/10/ | Clinical  | Cardiology |                      |                   |
|    | Support   |            |                      |                   |
+--------+-----------+------------+----------------------+-------------------+
| / | Office    | Cardiology |   Tonia Wilson,    |                   |
|    | Visit     |            | ARNP  [address]   |                   |
|        |           |            | [place]  |                   |
|        |           |            | [address]  [phone]  |                   |
|        |           |            |  [phone] (Fax)  |                   |
+--------+-----------+------------+----------------------+-------------------+
| / | Off-Site  | Nephrology |   Marzena Moser  |                   |
|    | Visit     |            | DO ETIENNE  [address] [place] [address]      |                   |
|        |           |            | [address]      |                   |
|        |           |            | [place]      |                   |
|        |           |            | [address]  [phone]  |                   |
|        |           |            |  [phone] (Fax)  |                   |
+--------+-----------+------------+----------------------+-------------------+
 documented as of this encounter
 
 Visit Diagnoses
 Not on filedocumented in this encounter

## 2020-01-08 NOTE — XMS
Encounter Summary
  Created on: 2020
 
 Viviana Ricci
 External Reference #: [id number]
 : 47
 Sex: Female
 
 Demographics
 
 
+-----------------------+----------------------+
| Address               | [address]      |
|                       | [place]  [address] |
+-----------------------+----------------------+
| Home Phone            | [phone]      |
+-----------------------+----------------------+
| Preferred Language    | Unknown              |
+-----------------------+----------------------+
| Marital Status        | Single               |
+-----------------------+----------------------+
| Cheondoism Affiliation | [id number]                 |
+-----------------------+----------------------+
| Race                  | Unknown              |
+-----------------------+----------------------+
| Ethnic Group          | Unknown              |
+-----------------------+----------------------+
 
 
 Author
 
 
+--------------+--------------------------------------------+
| Author       | [facility]  |
|              | and [place]                                |
+--------------+--------------------------------------------+
| Organization | [facility]  |
|              | and [place]                                |
+--------------+--------------------------------------------+
| Address      | Unknown                                    |
+--------------+--------------------------------------------+
| Phone        | Unavailable                                |
+--------------+--------------------------------------------+
 
 
 
 Support
 
 
+----------------+--------------+---------------------+-----------------+
| Name           | Relationship | Address             | Phone           |
+----------------+--------------+---------------------+-----------------+
| Ada/Ed Radhames | ECON         | [address]              | [phone] |
|                |              | [place]  |                 |
|                |              |  [address]              |                 |
+----------------+--------------+---------------------+-----------------+
 
 
 
 
 Care Team Providers
 
 
+------------------------+------+-----------------+
| Care Team Member Name  | Role | Phone           |
+------------------------+------+-----------------+
| Marzena Moser DO | PCP  | [phone] |
+------------------------+------+-----------------+
 
 
 
 Reason for Visit
 
 
+-------------+----------+
| Reason      | Comments |
+-------------+----------+
| Medication  |          |
| Management  |          |
+-------------+----------+
 
 
 
 Encounter Details
 
 
+--------+-----------+---------------------+----------------------+-------------+
| Date   | Type      | Department          | Care Team            | Description |
+--------+-----------+---------------------+----------------------+-------------+
| 08/15/ | Telephone |   [facility]         |   ChengmaxxMarzena  | Medication  |
| 2019   |           | NEPHROLOGY  [address]   | M, DO  [address]      | Management  |
|        |           | [address]   | [address]      |             |
|        |           | [place], [place]     | [place], [place]      |             |
|        |           | [address]          | [address]  [phone]  |             |
|        |           | [phone]        |  [phone] (Fax)  |             |
+--------+-----------+---------------------+----------------------+-------------+
 
 
 
 Social History
 
 
+--------------+-------+-----------+--------+------+
| Tobacco Use  | Types | Packs/Day | Years  | Date |
|              |       |           | Used   |      |
+--------------+-------+-----------+--------+------+
| Never Smoker |       |           |        |      |
+--------------+-------+-----------+--------+------+
 
 
 
+---------------------+---+---+---+
| Smokeless Tobacco:  |   |   |   |
| Never Used          |   |   |   |
+---------------------+---+---+---+
 
 
 
+---------------------------------------------------------------+
 
| Comments: some second hand smoke exposure, but fairly minimal |
+---------------------------------------------------------------+
 
 
 
+-------------+-------------+---------+----------+
| Alcohol Use | Drinks/Week | oz/Week | Comments |
+-------------+-------------+---------+----------+
| No          |             |         |          |
+-------------+-------------+---------+----------+
 
 
 
+------------------+---------------+
| Sex Assigned at  | Date Recorded |
| Birth            |               |
+------------------+---------------+
| Not on file      |               |
+------------------+---------------+
 
 
 
+----------------+-------------+-------------+
| Job Start Date | Occupation  | Industry    |
+----------------+-------------+-------------+
| Not on file    | Not on file | Not on file |
+----------------+-------------+-------------+
 
 
 
+----------------+--------------+------------+
| Travel History | Travel Start | Travel End |
+----------------+--------------+------------+
 
 
 
+-------------------------------------+
| No recent travel history available. |
+-------------------------------------+
 documented as of this encounter
 
 Plan of Treatment
 
 
+--------+-----------+------------+----------------------+-------------------+
| Date   | Type      | Specialty  | Care Team            | Description       |
+--------+-----------+------------+----------------------+-------------------+
| / | Office    | Cardiology |   RakeshmaxxArlen fuentesa,    |                   |
|    | Visit     |            | ARNP  [address]   |                   |
|        |           |            | St [place], [place]  |                   |
|        |           |            | [address]  [phone]  |                   |
|        |           |            |  [phone] (Fax)  |                   |
+--------+-----------+------------+----------------------+-------------------+
| / | Hospital  | Radiology  |   Popeye Townsend, |                   |
|    | Encounter |            |  MD  [address] |                   |
|        |           |            |  [place],   |                   |
|        |           |            | [place] [address]             |                   |
|        |           |            | [phone]         |                   |
|        |           |            | [phone] (Fax)   |                   |
+--------+-----------+------------+----------------------+-------------------+
 
| / | Surgery   | Radiology  |   Popeye Townsend, | CV EP PPM SYSTEM  |
|    |           |            |  MD  [address] | IMPLANT           |
|        |           |            |  St.  [place],   |                   |
|        |           |            | [place] [address]             |                   |
|        |           |            | [phone]         |                   |
|        |           |            | [phone] (Fax)   |                   |
+--------+-----------+------------+----------------------+-------------------+
| 02/10/ | Clinical  | Cardiology |                      |                   |
|    | Support   |            |                      |                   |
+--------+-----------+------------+----------------------+-------------------+
| / | Office    | Cardiology |   Tonia Wilson,    |                   |
|    | Visit     |            | ARNP  [address]   |                   |
|        |           |            | [place]  |                   |
|        |           |            | [address]  [phone]  |                   |
|        |           |            |  [phone] (Fax)  |                   |
+--------+-----------+------------+----------------------+-------------------+
| / | Off-Site  | Nephrology |   Marzena Moser  |                   |
|    | Visit     |            | M, DO  [address]      |                   |
|        |           |            | [address]      |                   |
|        |           |            | [place]      |                   |
|        |           |            | [address] [phone]  |                   |
|        |           |            |  [phone] (Fax)  |                   |
+--------+-----------+------------+----------------------+-------------------+
 documented as of this encounter
 
 Visit Diagnoses
 Not on filedocumented in this encounter

## 2020-01-08 NOTE — XMS
Encounter Summary
  Created on: 2020
 
 Viviana Ricci
 External Reference #: [id number]
 : 47
 Sex: Female
 
 Demographics
 
 
+-----------------------+----------------------+
| Address               | [address]      |
|                       | [place]  [address] |
+-----------------------+----------------------+
| Home Phone            | [phone]      |
+-----------------------+----------------------+
| Preferred Language    | Unknown              |
+-----------------------+----------------------+
| Marital Status        | Single               |
+-----------------------+----------------------+
| Jain Affiliation | [id number]                 |
+-----------------------+----------------------+
| Race                  | Unknown              |
+-----------------------+----------------------+
| Ethnic Group          | Unknown              |
+-----------------------+----------------------+
 
 
 Author
 
 
+--------------+--------------------------------------------+
| Author       | [facility]  |
|              | and [place]                                |
+--------------+--------------------------------------------+
| Organization | [facility]  |
|              | and [place]                                |
+--------------+--------------------------------------------+
| Address      | Unknown                                    |
+--------------+--------------------------------------------+
| Phone        | Unavailable                                |
+--------------+--------------------------------------------+
 
 
 
 Support
 
 
+----------------+--------------+---------------------+-----------------+
| Name           | Relationship | Address             | Phone           |
+----------------+--------------+---------------------+-----------------+
| Ada/Ed Radhames | ECON         | [address]              | [phone] |
|                |              | [place]  |                 |
|                |              |  [address]              |                 |
+----------------+--------------+---------------------+-----------------+
 
 
 
 
 Care Team Providers
 
 
+-----------------------+------+-------------+
| Care Team Member Name | Role | Phone       |
+-----------------------+------+-------------+
 PCP  | Unavailable |
+-----------------------+------+-------------+
 
 
 
 Reason for Visit
 
 
+-------------------+----------+
| Reason            | Comments |
+-------------------+----------+
| Medication Refill |          |
+-------------------+----------+
 
 
 
 Encounter Details
 
 
+--------+--------+---------------------+----------------------+-------------------+
| Date   | Type   | Department          | Care Team            | Description       |
+--------+--------+---------------------+----------------------+-------------------+
| / | Refill |   [facility]         |   Fackenthall,       | Medication Refill |
| 2018   |        | NEPHROLOGY  [address]   | BELKIS Taylor  [address] |                   |
|        |        | [address]   |  [address]    |                   |
|        |        | [place], [place]     | [address]  [place], [place] |                   |
|        |        | [address]          |  [address]  [phone] |                   |
|        |        | [phone]        |   [phone] (Fax) |                   |
+--------+--------+---------------------+----------------------+-------------------+
 
 
 
 Social History
 
 
+--------------+-------+-----------+--------+------+
| Tobacco Use  | Types | Packs/Day | Years  | Date |
|              |       |           | Used   |      |
+--------------+-------+-----------+--------+------+
| Never Smoker |       |           |        |      |
+--------------+-------+-----------+--------+------+
 
 
 
+---------------------+---+---+---+
| Smokeless Tobacco:  |   |   |   |
| Never Used          |   |   |   |
+---------------------+---+---+---+
 
 
 
+---------------------------------------------------------------+
| Comments: some second hand smoke exposure, but fairly minimal |
 
+---------------------------------------------------------------+
 
 
 
+-------------+-------------+---------+----------+
| Alcohol Use | Drinks/Week | oz/Week | Comments |
+-------------+-------------+---------+----------+
| No          |             |         |          |
+-------------+-------------+---------+----------+
 
 
 
+------------------+---------------+
| Sex Assigned at  | Date Recorded |
| Birth            |               |
+------------------+---------------+
| Not on file      |               |
+------------------+---------------+
 
 
 
+----------------+-------------+-------------+
| Job Start Date | Occupation  | Industry    |
+----------------+-------------+-------------+
| Not on file    | Not on file | Not on file |
+----------------+-------------+-------------+
 
 
 
+----------------+--------------+------------+
| Travel History | Travel Start | Travel End |
+----------------+--------------+------------+
 
 
 
+-------------------------------------+
| No recent travel history available. |
+-------------------------------------+
 documented as of this encounter
 
 Plan of Treatment
 
 
+--------+-----------+------------+----------------------+-------------------+
| Date   | Type      | Specialty  | Care Team            | Description       |
+--------+-----------+------------+----------------------+-------------------+
| / | Office    | Cardiology |   Tonia Wilson,    |                   |
|    | Visit     |            | ARNJESSICA  [address]   |                   |
|        |           |            | St  [place], [place]  |                   |
|        |           |            | [address]  [phone]  |                   |
|        |           |            |  [phone] (Fax)  |                   |
+--------+-----------+------------+----------------------+-------------------+
| / | Hospital  | Radiology  |   Popeye Townsend, |                   |
2020   | Encounter |            |  MD  [address] |                   |
|        |           |            |  St.  [place],   |                   |
|        |           |            | [place] [address]             |                   |
|        |           |            | [phone]         |                   |
|        |           |            | [phone] (Fax)   |                   |
+--------+-----------+------------+----------------------+-------------------+
| / | Surgery   | Radiology  |   Popeye Townsend, | CV EP PPM SYSTEM  |
 
|    |           |            |  MD  [address] | IMPLANT           |
|        |           |            |  St.  [place],   |                   |
|        |           |            | [place] [address]             |                   |
|        |           |            | [phone]         |                   |
|        |           |            | [phone] (Fax)   |                   |
+--------+-----------+------------+----------------------+-------------------+
| 02/10/ | Clinical  | Cardiology |                      |                   |
2020   | Support   |            |                      |                   |
+--------+-----------+------------+----------------------+-------------------+
| / | Office    | Cardiology |   Tonia Wilson,    |                   |
|    | Visit     |            | ARNP  [address]   |                   |
|        |           |            | [place]  |                   |
|        |           |            | [address]  [phone]  |                   |
|        |           |            |  [phone] (Fax)  |                   |
+--------+-----------+------------+----------------------+-------------------+
| / | Off-Site  | Nephrology |   Marzena Moser  |                   |
|    | Visit     |            | DO ETIENNE  [facility]      |                   |
|        |           |            | [address]      |                   |
|        |           |            | [place]      |                   |
|        |           |            | [address]  [phone]  |                   |
|        |           |            |  [phone] (Fax)  |                   |
+--------+-----------+------------+----------------------+-------------------+
 documented as of this encounter
 
 Visit Diagnoses
 Not on filedocumented in this encounter

## 2020-01-08 NOTE — XMS
Encounter Summary
  Created on: 2020
 
 Viviana Ricci
 External Reference #: [id number]
 : 47
 Sex: Female
 
 Demographics
 
 
+-----------------------+----------------------+
| Address               | [address]      |
|                       | [place]  [address] |
+-----------------------+----------------------+
| Home Phone            | [phone]      |
+-----------------------+----------------------+
| Preferred Language    | Unknown              |
+-----------------------+----------------------+
| Marital Status        | Single               |
+-----------------------+----------------------+
| Voodoo Affiliation | [id number]                 |
+-----------------------+----------------------+
| Race                  | Unknown              |
+-----------------------+----------------------+
| Ethnic Group          | Unknown              |
+-----------------------+----------------------+
 
 
 Author
 
 
+--------------+--------------------------------------------+
| Author       | [facility]  |
|              | and [place]                                |
+--------------+--------------------------------------------+
| Organization | [facility]  |
|              | and [place]                                |
+--------------+--------------------------------------------+
| Address      | Unknown                                    |
+--------------+--------------------------------------------+
| Phone        | Unavailable                                |
+--------------+--------------------------------------------+
 
 
 
 Support
 
 
+----------------+--------------+---------------------+-----------------+
| Name           | Relationship | Address             | Phone           |
+----------------+--------------+---------------------+-----------------+
| Ada/Ed Radhames | ECON         | [place]              | [phone] |
|                |              | [place]  |                 |
|                |              |  [id number]              |                 |
+----------------+--------------+---------------------+-----------------+
 
 
 
 
 Care Team Providers
 
 
+-----------------------+------+-------------+
| Care Team Member Name | Role | Phone       |
+-----------------------+------+-------------+
 PCP  | Unavailable |
+-----------------------+------+-------------+
 
 
 
 Reason for Visit
 
 
+-------------------+----------+
| Reason            | Comments |
+-------------------+----------+
| Medication Refill |          |
+-------------------+----------+
 
 
 
 Encounter Details
 
 
+--------+--------+---------------------+----------------------+-------------------+
| Date   | Type   | Department          | Care Team            | Description       |
+--------+--------+---------------------+----------------------+-------------------+
| / | Refill |   [place]         |   Marzena Moser  | Medication Refill |
| 2016   |        | NEPHROLOGY  [address]   | M, DO  [address]      |                   |
|        |        | [address]   | [address]      |                   |
|        |        | [place], [place]     | [place], [place]      |                   |
|        |        | [address]          | [address]  [phone]  |                   |
|        |        | [phone]        |  [phone] (Fax)  |                   |
+--------+--------+---------------------+----------------------+-------------------+
 
 
 
 Social History
 
 
+--------------+-------+-----------+--------+------+
| Tobacco Use  | Types | Packs/Day | Years  | Date |
|              |       |           | Used   |      |
+--------------+-------+-----------+--------+------+
| Never Smoker |       |           |        |      |
+--------------+-------+-----------+--------+------+
 
 
 
+---------------------+---+---+---+
| Smokeless Tobacco:  |   |   |   |
| Never Used          |   |   |   |
+---------------------+---+---+---+
 
 
 
+---------------------------------------------------------------+
| Comments: some second hand smoke exposure, but fairly minimal |
 
+---------------------------------------------------------------+
 
 
 
+-------------+-------------+---------+----------+
| Alcohol Use | Drinks/Week | oz/Week | Comments |
+-------------+-------------+---------+----------+
| No          |             |         |          |
+-------------+-------------+---------+----------+
 
 
 
+------------------+---------------+
| Sex Assigned at  | Date Recorded |
| Birth            |               |
+------------------+---------------+
| Not on file      |               |
+------------------+---------------+
 
 
 
+----------------+-------------+-------------+
| Job Start Date | Occupation  | Industry    |
+----------------+-------------+-------------+
| Not on file    | Not on file | Not on file |
+----------------+-------------+-------------+
 
 
 
+----------------+--------------+------------+
| Travel History | Travel Start | Travel End |
+----------------+--------------+------------+
 
 
 
+-------------------------------------+
| No recent travel history available. |
+-------------------------------------+
 documented as of this encounter
 
 Plan of Treatment
 
 
+--------+-----------+------------+----------------------+-------------------+
| Date   | Type      | Specialty  | Care Team            | Description       |
+--------+-----------+------------+----------------------+-------------------+
| / | Office    | Cardiology |   HellalfredoTonia,    |                   |
|    | Visit     |            | ARNP  [address]   |                   |
|        |           |            | St  [place], [place]  |                   |
|        |           |            | [address]  [phone]  |                   |
|        |           |            |  [phone] (Fax)  |                   |
+--------+-----------+------------+----------------------+-------------------+
| / | Hospital  | Radiology  |   Popeye Townsend, |                   |
|    | Encounter |            |  MD  [address] |                   |
|        |           |            |  St.  [place],   |                   |
|        |           |            | [place] [address]             |                   |
|        |           |            | [phone]         |                   |
|        |           |            | [phone] (Fax)   |                   |
+--------+-----------+------------+----------------------+-------------------+
| / | Surgery   | Radiology  |   Popeye Townsend, | CV EP PPM SYSTEM  |
 
|    |           |            |  MD  [address] | IMPLANT           |
|        |           |            |  St.  [place],   |                   |
|        |           |            | [place] [address]             |                   |
|        |           |            | [phone]         |                   |
|        |           |            | [phone] (Fax)   |                   |
+--------+-----------+------------+----------------------+-------------------+
| 02/10/ | Clinical  | Cardiology |                      |                   |
|    | Support   |            |                      |                   |
+--------+-----------+------------+----------------------+-------------------+
| / | Office    | Cardiology |   Tonia Wilson,    |                   |
|    | Visit     |            | ARNP  [address]   |                   |
|        |           |            | [place]  |                   |
|        |           |            | [address]  [phone]  |                   |
|        |           |            |  [phone] (Fax)  |                   |
+--------+-----------+------------+----------------------+-------------------+
| / | Off-Site  | Nephrology |   Marzena Moser  |                   |
|    | Visit     |            | DO ETIENNE  [address] [place] [address]      |                   |
|        |           |            | [address]      |                   |
|        |           |            | [place]      |                   |
|        |           |            | [address]  [phone]  |                   |
|        |           |            |  [phone] (Fax)  |                   |
+--------+-----------+------------+----------------------+-------------------+
 documented as of this encounter
 
 Visit Diagnoses
 
 
+------------------------------------------------------------------------------------------+
| Diagnosis                                                                                |
+------------------------------------------------------------------------------------------+
|   Type 2 diabetes mellitus with ESRD (end-stage renal disease) (HCC) - Primary  Type II  |
| or unspecified type diabetes mellitus with renal manifestations, not stated as           |
| uncontrolled                                                                             |
+------------------------------------------------------------------------------------------+
|   Complication of transplanted kidney, unspecified complication                          |
+------------------------------------------------------------------------------------------+
|   DIABETES MELLITUS, TYPE II, UNCONTROLLED  Type II or unspecified type diabetes         |
| mellitus without mention of complication, uncontrolled                                   |
+------------------------------------------------------------------------------------------+
|   Hyperlipidemia, unspecified hyperlipidemia type                                        |
+------------------------------------------------------------------------------------------+
 documented in this encounter

## 2020-01-08 NOTE — XMS
Encounter Summary
  Created on: 2020
 
 Viviana Ricci
 External Reference #: [id number]
 : 47
 Sex: Female
 
 Demographics
 
 
+-----------------------+----------------------+
| Address               | [address]      |
|                       | [place]  [address] |
+-----------------------+----------------------+
| Home Phone            | [phone]      |
+-----------------------+----------------------+
| Preferred Language    | Unknown              |
+-----------------------+----------------------+
| Marital Status        | Single               |
+-----------------------+----------------------+
| Voodoo Affiliation | 1009                 |
+-----------------------+----------------------+
| Race                  | Unknown              |
+-----------------------+----------------------+
| Ethnic Group          | Unknown              |
+-----------------------+----------------------+
 
 
 Author
 
 
+--------------+--------------------------------------------+
| Author       | [facility]  |
|              | and [place]                                |
+--------------+--------------------------------------------+
| Organization | [facility]  |
|              | and [place]                                |
+--------------+--------------------------------------------+
| Address      | Unknown                                    |
+--------------+--------------------------------------------+
| Phone        | Unavailable                                |
+--------------+--------------------------------------------+
 
 
 
 Support
 
 
+----------------+--------------+---------------------+-----------------+
| Name           | Relationship | Address             | Phone           |
+----------------+--------------+---------------------+-----------------+
| Ada/Ed Radhames | ECON         | [place]              | [phone] |
|                |              | [place]  |                 |
|                |              |  [address]              |                 |
+----------------+--------------+---------------------+-----------------+
 
 
 
 
 Care Team Providers
 
 
+-----------------------+------+-------------+
| Care Team Member Name | Role | Phone       |
+-----------------------+------+-------------+
 PCP  | Unavailable |
+-----------------------+------+-------------+
 
 
 
 Encounter Details
 
 
+--------+----------+---------------------+----------------------+-------------+
| Date   | Type     | Department          | Care Team            | Description |
+--------+----------+---------------------+----------------------+-------------+
| 07/15/ | Abstract |   [facility] [place]         |   Marzena Moser  |             |
|    |          | NEPHROLOGY  [address]   | M, DO  [address]      |             |
|        |          | [address]   | [address]      |             |
|        |          | [place], [place]     | [place]      |             |
|        |          | [address]          | [address]  [phone]  |             |
|        |          | [phone]        |  [phone] (Fax)  |             |
+--------+----------+---------------------+----------------------+-------------+
 
 
 
 Social History
 
 
+--------------+-------+-----------+--------+------+
| Tobacco Use  | Types | Packs/Day | Years  | Date |
|              |       |           | Used   |      |
+--------------+-------+-----------+--------+------+
| Never Smoker |       |           |        |      |
+--------------+-------+-----------+--------+------+
 
 
 
+---------------------+---+---+---+
| Smokeless Tobacco:  |   |   |   |
| Never Used          |   |   |   |
+---------------------+---+---+---+
 
 
 
+---------------------------------------------------------------+
| Comments: some second hand smoke exposure, but fairly minimal |
+---------------------------------------------------------------+
 
 
 
+-------------+-------------+---------+----------+
| Alcohol Use | Drinks/Week | oz/Week | Comments |
+-------------+-------------+---------+----------+
| No          |             |         |          |
+-------------+-------------+---------+----------+
 
 
 
 
+------------------+---------------+
| Sex Assigned at  | Date Recorded |
| Birth            |               |
+------------------+---------------+
| Not on file      |               |
+------------------+---------------+
 
 
 
+----------------+-------------+-------------+
| Job Start Date | Occupation  | Industry    |
+----------------+-------------+-------------+
| Not on file    | Not on file | Not on file |
+----------------+-------------+-------------+
 
 
 
+----------------+--------------+------------+
| Travel History | Travel Start | Travel End |
+----------------+--------------+------------+
 
 
 
+-------------------------------------+
| No recent travel history available. |
+-------------------------------------+
 documented as of this encounter
 
 Plan of Treatment
 
 
+--------+-----------+------------+----------------------+-------------------+
| Date   | Type      | Specialty  | Care Team            | Description       |
+--------+-----------+------------+----------------------+-------------------+
| / | Office    | Cardiology |   Tonia Wilson,    |                   |
|    | Visit     |            | ARNJESSICA  [address]   |                   |
|        |           |            | [place]  |                   |
|        |           |            | [address]  [phone]  |                   |
|        |           |            |  [phone] (Fax)  |                   |
+--------+-----------+------------+----------------------+-------------------+
| / | Hospital  | Radiology  |   Popeye Townsend, |                   |
|    | Encounter |            |  MD  [address] |                   |
|        |           |            |  St.  [place],   |                   |
|        |           |            | [place] [address]             |                   |
|        |           |            | [phone]         |                   |
|        |           |            | [phone] (Fax)   |                   |
+--------+-----------+------------+----------------------+-------------------+
| / | Surgery   | Radiology  |   Popeye Townsend, | CV EP PPM SYSTEM  |
|    |           |            |  MD  [address] | IMPLANT           |
|        |           |            |  St.  [place],   |                   |
|        |           |            | [place] [address]             |                   |
|        |           |            | [phone]         |                   |
|        |           |            | [phone] (Fax)   |                   |
+--------+-----------+------------+----------------------+-------------------+
| 02/10/ | Clinical  | Cardiology |                      |                   |
|    | Support   |            |                      |                   |
+--------+-----------+------------+----------------------+-------------------+
| / | Office    | Cardiology |   Tonia Wilosn,    |                   |
|    | Visit     |            | ARNP  [address]   |                   |
 
|        |           |            | St  [place], [place]  |                   |
|        |           |            | [address]  [phone]  |                   |
|        |           |            |  [phone] (Fax)  |                   |
+--------+-----------+------------+----------------------+-------------------+
| / | Off-Site  | Nephrology |   Marzena Moser  |                   |
| 2020   | Visit     |            | M,   [address] [place] [address]      |                   |
|        |           |            | [address]      |                   |
|        |           |            | [place]      |                   |
|        |           |            | [address]  [phone]  |                   |
|        |           |            |  [phone] (Fax)  |                   |
+--------+-----------+------------+----------------------+-------------------+
 documented as of this encounter
 
 Procedures
 
 
+----------------------+--------+------------+----------------------+----------------------+
| Procedure Name       | Priori | Date/Time  | Associated Diagnosis | Comments             |
|                      | ty     |            |                      |                      |
+----------------------+--------+------------+----------------------+----------------------+
| CMP14+LP+CBC/D/PLT+T | Routin | 07/10/2013 |                      |   Results for this   |
| SH+UA/M (NON ORD)    | e      |            |                      | procedure are in the |
|                      |        |            |                      |  results section.    |
+----------------------+--------+------------+----------------------+----------------------+
 documented in this encounter
 
 Results
 CMP14+LP+CBC/D/Plt+TSH+UA/M (07/10/2013)
 
+-------------+-----------+-----------------+------------+--------------+
| Component   | Value     | Ref Range       | Performed  | Pathologist  |
|             |           |                 | At         | Signature    |
+-------------+-----------+-----------------+------------+--------------+
| Na          | 143       | mmol/L          |            |              |
+-------------+-----------+-----------------+------------+--------------+
| K           | 4.8       | mmol/L          |            |              |
+-------------+-----------+-----------------+------------+--------------+
| Cl          | 115       | mmol/L          |            |              |
+-------------+-----------+-----------------+------------+--------------+
| CO2         | 19        | mmol/L          |            |              |
+-------------+-----------+-----------------+------------+--------------+
| BUN         | 69        | mg/dL           |            |              |
+-------------+-----------+-----------------+------------+--------------+
| CREA        | 1.7       | mg/dL           |            |              |
+-------------+-----------+-----------------+------------+--------------+
| Glucose     | 103       | mg/dL           |            |              |
+-------------+-----------+-----------------+------------+--------------+
| Calcium     | 9.0       | mg/dL           |            |              |
+-------------+-----------+-----------------+------------+--------------+
| Phosphorus  | 4.1       | 2.6 - 4.4 mg/dL |            |              |
+-------------+-----------+-----------------+------------+--------------+
| WBC         | 4.6       | K/uL            |            |              |
+-------------+-----------+-----------------+------------+--------------+
| Hemoglobin  | 11.9      | 11.3 - 15.5     |            |              |
|             |           | g/dL            |            |              |
+-------------+-----------+-----------------+------------+--------------+
| Hematocrit  | 35.7      | 34.0 - 46.0 %   |            |              |
+-------------+-----------+-----------------+------------+--------------+
| Platelet    | 145 (A)   | 150 - 400 K/uL  |            |              |
| Count       |           |                 |            |              |
 
+-------------+-----------+-----------------+------------+--------------+
| MCV         | 101.8 (A) | 80.0 - 98.0 fL  |            |              |
+-------------+-----------+-----------------+------------+--------------+
| Bilirubin   | 0.4       | 0.1 - 1.5 mg/dL |            |              |
| Total       |           |                 |            |              |
+-------------+-----------+-----------------+------------+--------------+
| AST         | 20        | 10 - 45 U/L     |            |              |
+-------------+-----------+-----------------+------------+--------------+
| ALT         | 14        | U/L             |            |              |
+-------------+-----------+-----------------+------------+--------------+
| Alkaline    | 80        | 35 - 115 U/L    |            |              |
| Phosphatase |           |                 |            |              |
+-------------+-----------+-----------------+------------+--------------+
| Albumin     | 3.6       | 3.3 - 4.8 g/dL  |            |              |
+-------------+-----------+-----------------+------------+--------------+
| Estimated   | 31.0      | mL/min/1.73m2   |            |              |
| GFR         |           |                 |            |              |
+-------------+-----------+-----------------+------------+--------------+
| Magnesium   | 2.1       | mg/dL           |            |              |
+-------------+-----------+-----------------+------------+--------------+
| Tacrolimus  | 6.3       |                 |            |              |
| Level       |           |                 |            |              |
+-------------+-----------+-----------------+------------+--------------+
| PTH INTACT  | 303.5     | pg/mL           |            |              |
+-------------+-----------+-----------------+------------+--------------+
| TSH         | 0.69      | uIU/mL          |            |              |
+-------------+-----------+-----------------+------------+--------------+
| BK Virus    | detected  |                 |            |              |
| DNA, Urine, |           |                 |            |              |
|  PCR        |           |                 |            |              |
+-------------+-----------+-----------------+------------+--------------+
 
 
 
+----------+
| Specimen |
+----------+
|          |
+----------+
 documented in this encounter
 
 Visit Diagnoses
 Not on filedocumented in this encounter

## 2020-01-08 NOTE — XMS
Encounter Summary
  Created on: 2020
 
 Viviana Ricci
 External Reference #: [id number]
 : 47
 Sex: Female
 
 Demographics
 
 
+-----------------------+----------------------+
| Address               | [address]      |
|                       | [place]  [address] |
+-----------------------+----------------------+
| Home Phone            | [phone]      |
+-----------------------+----------------------+
| Preferred Language    | Unknown              |
+-----------------------+----------------------+
| Marital Status        | Single               |
+-----------------------+----------------------+
| [employer] Affiliation | [id number]                 |
+-----------------------+----------------------+
| Race                  | Unknown              |
+-----------------------+----------------------+
| Ethnic Group          | Unknown              |
+-----------------------+----------------------+
 
 
 Author
 
 
+--------------+--------------------------------------------+
| Author       | [facility]  |
|              | and [facility]                                |
+--------------+--------------------------------------------+
| Organization | [facility]  |
|              | and [facility]                                |
+--------------+--------------------------------------------+
| Address      | Unknown                                    |
+--------------+--------------------------------------------+
| Phone        | Unavailable                                |
+--------------+--------------------------------------------+
 
 
 
 Support
 
 
+----------------+--------------+---------------------+-----------------+
| Name           | Relationship | Address             | Phone           |
+----------------+--------------+---------------------+-----------------+
| Ada/Ed Radhames | ECON         | [address]              | [phone] |
|                |              | [place]  |                 |
|                |              |  [id number]              |                 |
+----------------+--------------+---------------------+-----------------+
 
 
 
 
 Care Team Providers
 
 
+-----------------------+------+-------------+
| Care Team Member Name | Role | Phone       |
+-----------------------+------+-------------+
 PCP  | Unavailable |
+-----------------------+------+-------------+
 
 
 
 Encounter Details
 
 
+--------+-----------+----------------------+----------------------+-------------+
| Date   | Type      | Department           | Care Team            | Description |
+--------+-----------+----------------------+----------------------+-------------+
| 07/15/ | [facility]  |   [facility] |   Marzena Moser  |             |
|  - | Encounter |  MED CTR XRAY  [address] | M, DO  [address]      |             |
|        |           |  [place]       | [place]      |             |
| 10/05/ |           | [place] [id number] | [place]      |             |
|    |           |   [phone]       | [id number] [phone]  |             |
|        |           |                      |  [phone] (Fax)  |             |
+--------+-----------+----------------------+----------------------+-------------+
 
 
 
 Social History
 
 
+----------------+-------+-----------+--------+------+
| Tobacco Use    | Types | Packs/Day | Years  | Date |
|                |       |           | Used   |      |
+----------------+-------+-----------+--------+------+
| Never Assessed |       |           |        |      |
+----------------+-------+-----------+--------+------+
 
 
 
+------------------+---------------+
| Sex Assigned at  | Date Recorded |
| Birth            |               |
+------------------+---------------+
| Not on file      |               |
+------------------+---------------+
 
 
 
+----------------+-------------+-------------+
| Job Start Date | Occupation  | Industry    |
+----------------+-------------+-------------+
| Not on file    | Not on file | Not on file |
+----------------+-------------+-------------+
 
 
 
+----------------+--------------+------------+
| Travel History | Travel Start | Travel End |
+----------------+--------------+------------+
 
 
 
 
+-------------------------------------+
| No recent travel history available. |
+-------------------------------------+
 documented as of this encounter
 
 Medications at Time of Discharge
 
 
+----------------------+----------------------+-----------+---------+----------+-----------+
| Medication           | Sig                  | Dispensed | Refills | Start    | End Date  |
|                      |                      |           |         | Date     |           |
+----------------------+----------------------+-----------+---------+----------+-----------+
|   allopurinol        | Take 100 mg by mouth |           | 0       | 20 |  |
| (ZYLOPRIM) 100 mg    |  Daily.              |           |         | 12       | 3         |
| tablet               |                      |           |         |          |           |
+----------------------+----------------------+-----------+---------+----------+-----------+
|   aspirin 81 MG EC   | Take 81 mg by mouth  |           | 0       | 20 |  |
| tablet               | Daily.               |           |         | 12       | 5         |
+----------------------+----------------------+-----------+---------+----------+-----------+
|   atorvaSTATin       | Take 80 mg by mouth  |           | 0       | 20 |  |
| (LIPITOR) 80 MG      | Every other day.     |           |         | 12       | 2         |
| tablet               |                      |           |         |          |           |
+----------------------+----------------------+-----------+---------+----------+-----------+
|   Cholecalciferol    | Take 5,000 Units by  |           | 0       | 20 |  |
| (VITAMIN D3) 5000    | mouth Once a week.   |           |         | 12       | 4         |
| UNITS CAPS           |                      |           |         |          |           |
+----------------------+----------------------+-----------+---------+----------+-----------+
|   cinacalcet         | Take 30 mg by mouth  |           | 0       | 20 | 10/29/201 |
| (SENSIPAR) 30 mg     | Daily.               |           |         | 11       | 2         |
| tablet               |                      |           |         |          |           |
+----------------------+----------------------+-----------+---------+----------+-----------+
|   fludrocortisone    | Take 1 tablet by     |   30      | 11      | 10/01/20 |  |
| (FLORINEF) 0.1 mg    | mouth Every other    | tablet    |         | 12       | 4         |
| tablet               | day.                 |           |         |          |           |
+----------------------+----------------------+-----------+---------+----------+-----------+
|   furosemide (LASIX) | Take two tablets by  |           | 0       | 20 |  |
|  40 mg tablet        | mouth daily.         |           |         | 12       | 2         |
+----------------------+----------------------+-----------+---------+----------+-----------+
|   glucose blood VI   | Use as directed      |           | 0       | 20 |  |
| test strips (ONE     |                      |           |         | 12       | 2         |
| TOUCH ULTRA TEST)    |                      |           |         |          |           |
| strip                |                      |           |         |          |           |
+----------------------+----------------------+-----------+---------+----------+-----------+
|   insulin glargine   | Inject 20 Units      |           | 0       | 20 |  |
| (LANTUS) 100         | under the skin every |           |         | 12       | 3         |
| units/mL             |  morning.            |           |         |          |           |
| injectionIndications |                      |           |         |          |           |
| : Unspecified        |                      |           |         |          |           |
| hypertensive kidney  |                      |           |         |          |           |
| disease with chronic |                      |           |         |          |           |
|  kidney disease      |                      |           |         |          |           |
| stage I through      |                      |           |         |          |           |
| stage IV, or         |                      |           |         |          |           |
| unspecified(403.90), |                      |           |         |          |           |
|  Complications of    |                      |           |         |          |           |
| transplanted kidney, |                      |           |         |          |           |
|  Diabetes mellitus   |                      |           |         |          |           |
 
| type II,             |                      |           |         |          |           |
| uncontrolled (HCC),  |                      |           |         |          |           |
| Hyperlipidemia       |                      |           |         |          |           |
+----------------------+----------------------+-----------+---------+----------+-----------+
|   insulin glargine   | Inject 20 units      |           | 0       | 20 |  |
| (LANTUS) 100         | subcutaneously as    |           |         | 12       | 2         |
| units/mL injection   | directed             |           |         |          |           |
+----------------------+----------------------+-----------+---------+----------+-----------+
|   insulin lispro     | Inject               |           | 0       | 20 |  |
| (HUMALOG) 100        | subcutaneously       |           |         | 12       | 3         |
| units/mL injection   | before meals         |           |         |          |           |
|                      | according to sliding |           |         |          |           |
|                      |  scale               |           |         |          |           |
+----------------------+----------------------+-----------+---------+----------+-----------+
|   Insulin            | Use to inject        |           | 0       | 20 |  |
| Syringe-Needle U-100 | insulin              |           |         | 12       | 3         |
|  (BD INSULIN SYRINGE | subcutaneously       |           |         |          |           |
|  ULTRAFINE) 31G X    | before meals or as   |           |         |          |           |
| 5/16" 0.5 ML MISC    | directed             |           |         |          |           |
+----------------------+----------------------+-----------+---------+----------+-----------+
|   levothyroxine      | Take 1 tablet by     |   30      | 11      | 10/01/20 |  |
| (LEVOTHROID) 50 mcg  | mouth Daily.         | tablet    |         | 12       | 3         |
| tablet               |                      |           |         |          |           |
+----------------------+----------------------+-----------+---------+----------+-----------+
|   lisinopril         | Take 30 mg by mouth  |           | 0       | 20 |  |
| (PRINIVIL,ZESTRIL)   | Daily.               |           |         | 11       | 2         |
| 30 MG tablet         |                      |           |         |          |           |
+----------------------+----------------------+-----------+---------+----------+-----------+
|   loperamide         | Take 2 mg by mouth   |           | 0       | 20 |  |
| (ANTI-DIARRHEAL) 2   | Daily as needed.     |           |         | 12       | 4         |
| mg capsule           |                      |           |         |          |           |
+----------------------+----------------------+-----------+---------+----------+-----------+
|   magnesium oxide    | Take 400 mg by mouth |           | 0       | 20 |  |
| (MAG-OX) 400 mg      |  2 times daily.      |           |         | 12       | 3         |
| tablet               |                      |           |         |          |           |
+----------------------+----------------------+-----------+---------+----------+-----------+
|   metoprolol         | Take 25 mg by mouth  |           | 0       | 20 |  |
| tartrate (LOPRESSOR) | 2 times daily.       |           |         | 12       | 3         |
|  25 mg tablet        |                      |           |         |          |           |
+----------------------+----------------------+-----------+---------+----------+-----------+
|   mycophenolate      | Take 250 mg by mouth |           | 0       | 20 | 10/14/201 |
| (CELLCEPT) 250 mg    |  3 times daily.      |           |         | 11       | 5         |
| capsule              |                      |           |         |          |           |
+----------------------+----------------------+-----------+---------+----------+-----------+
|   niacin (NIASPAN)   | Not taking           |           | 0       | 20 |  |
| 500 mg CR tablet     |                      |           |         | 11       | 3         |
+----------------------+----------------------+-----------+---------+----------+-----------+
|   omeprazole         | Take one capsule by  |           | 0       | 20 |  |
| (PRILOSEC) 20 mg     | mouth once daily on  |           |         | 12       | 3         |
| capsule              | an empty stomach     |           |         |          |           |
+----------------------+----------------------+-----------+---------+----------+-----------+
|   predniSONE         | Take 5 mg by mouth   |           | 0       | 20 |  |
| (DELTASONE) 5 mg     | Daily.               |           |         | 12       | 2         |
| tablet               |                      |           |         |          |           |
+----------------------+----------------------+-----------+---------+----------+-----------+
|   Prenatal           | Take 0.8 mg by mouth |           | 0       | 20 |  |
| Multivit-Min-Fe-FA   |  Daily.              |           |         | 12       | 3         |
| (PRENATAL VITAMINS)  |                      |           |         |          |           |
| 0.8 MG TABS          |                      |           |         |          |           |
+----------------------+----------------------+-----------+---------+----------+-----------+
 
|   tacrolimus         | TAD.                 |           | 0       | 20 | 07/15/201 |
| (PROGRAF) 0.5 mg     |                      |           |         | 12       | 4         |
| capsuleIndications:  |                      |           |         |          |           |
| Unspecified          |                      |           |         |          |           |
| hypertensive kidney  |                      |           |         |          |           |
| disease with chronic |                      |           |         |          |           |
|  kidney disease      |                      |           |         |          |           |
| stage I through      |                      |           |         |          |           |
| stage IV, or         |                      |           |         |          |           |
| unspecified(403.90), |                      |           |         |          |           |
|  Complications of    |                      |           |         |          |           |
| transplanted kidney, |                      |           |         |          |           |
|  Diabetes mellitus   |                      |           |         |          |           |
| type II,             |                      |           |         |          |           |
| uncontrolled ([facility]),  |                      |           |         |          |           |
| Hyperlipidemia       |                      |           |         |          |           |
+----------------------+----------------------+-----------+---------+----------+-----------+
|   tacrolimus         | Take one capsule by  |           | 0       | 20 |  |
| (PROGRAF) 0.5 mg     | mouth in the pm WITH |           |         | 12       | 2         |
| capsule              |  one mg capsule in   |           |         |          |           |
|                      | pm, equaling 1.5 mg  |           |         |          |           |
|                      | in the pm            |           |         |          |           |
+----------------------+----------------------+-----------+---------+----------+-----------+
|   tacrolimus         | TAD                  |           | 0       | 20 | 10/10/201 |
| (PROGRAF) 0.5 mg     |                      |           |         | 11       | 2         |
| capsule              |                      |           |         |          |           |
+----------------------+----------------------+-----------+---------+----------+-----------+
|   tacrolimus         | Take 1 mg by mouth 2 |           | 0       | 20 |  |
| (PROGRAF) 1 mg       |  times daily.        |           |         | 12       | 3         |
| capsuleIndications:  |                      |           |         |          |           |
| Unspecified          |                      |           |         |          |           |
| hypertensive kidney  |                      |           |         |          |           |
| disease with chronic |                      |           |         |          |           |
|  kidney disease      |                      |           |         |          |           |
| stage I through      |                      |           |         |          |           |
| stage IV, or         |                      |           |         |          |           |
| unspecified(403.90), |                      |           |         |          |           |
|  Complications of    |                      |           |         |          |           |
| transplanted kidney, |                      |           |         |          |           |
|  Diabetes mellitus   |                      |           |         |          |           |
| type II,             |                      |           |         |          |           |
| uncontrolled ([facility]),  |                      |           |         |          |           |
| Hyperlipidemia       |                      |           |         |          |           |
+----------------------+----------------------+-----------+---------+----------+-----------+
|   tacrolimus         | Take two capsules by |           | 0       | 20 |  |
| (PROGRAF) 1 mg       |  mouth in the am,    |           |         | 12       | 2         |
| capsule              | and one capsule by   |           |         |          |           |
|                      | mouth in the pm      |           |         |          |           |
+----------------------+----------------------+-----------+---------+----------+-----------+
|   tacrolimus         | 1.5 mg twice daily   |           | 0       | 20 | 10/10/201 |
| (PROGRAF) 1 mg       |                      |           |         | 11       | 2         |
| capsule              |                      |           |         |          |           |
+----------------------+----------------------+-----------+---------+----------+-----------+
|   valsartan (DIOVAN) | Take 160 mg by mouth |           | 0       | 20 |  |
|  160 mg tablet       |  Daily.              |           |         | 12       | 3         |
+----------------------+----------------------+-----------+---------+----------+-----------+
 documented as of this encounter
 
 Plan of Treatment
 
 
 
+--------+-----------+------------+----------------------+-------------------+
| Date   | Type      | Specialty  | Care Team            | Description       |
+--------+-----------+------------+----------------------+-------------------+
| / | Office    | Cardiology |   Tonia Wilson,    |                   |
|    | Visit     |            | BELKIS  [address]   |                   |
|        |           |            | [place]  |                   |
|        |           |            | [address]  [phone]  |                   |
|        |           |            |  [phone] (Fax)  |                   |
+--------+-----------+------------+----------------------+-------------------+
| / | Hospital  | Radiology  |   Popeye Townsend, |                   |
|    | Encounter |            |  MD  [address] |                   |
|        |           |            |  St.  [place],   |                   |
|        |           |            | [place] [address]             |                   |
|        |           |            | [phone]         |                   |
|        |           |            | [phone] (Fax)   |                   |
+--------+-----------+------------+----------------------+-------------------+
| / | Surgery   | Radiology  |   Popeye Townsend, | CV EP PPM SYSTEM  |
|    |           |            |  MD  [address] | IMPLANT           |
|        |           |            |  St.  [place],   |                   |
|        |           |            | [place] [address]             |                   |
|        |           |            | [phone]         |                   |
|        |           |            | [phone] (Fax)   |                   |
+--------+-----------+------------+----------------------+-------------------+
| 02/10/ | Clinical  | Cardiology |                      |                   |
|    | Support   |            |                      |                   |
+--------+-----------+------------+----------------------+-------------------+
| / | Office    | Cardiology |   Tonia Wilson,    |                   |
|    | Visit     |            | ARNP  [address]   |                   |
|        |           |            | St  [place]  |                   |
|        |           |            | [address]  [phone]  |                   |
|        |           |            |  [phone] (Fax)  |                   |
+--------+-----------+------------+----------------------+-------------------+
| / | Off-Site  | Nephrology |   Marzena Moser  |                   |
| 2020   | Visit     |            | M, DO  [address]      |                   |
|        |           |            | [address]      |                   |
|        |           |            | [place]      |                   |
|        |           |            | [id number] [phone]  |                   |
|        |           |            |  [phone] (Fax)  |                   |
+--------+-----------+------------+----------------------+-------------------+
 documented as of this encounter
 
 Visit Diagnoses
 Not on filedocumented in this encounter

## 2020-01-08 NOTE — XMS
Encounter Summary
  Created on: 2020
 
 Viviana Ricci
 External Reference #: [id number]
 : 47
 Sex: Female
 
 Demographics
 
 
+-----------------------+----------------------+
| Address               | [address]      |
|                       | [place]  [address] |
+-----------------------+----------------------+
| Home Phone            | [phone]      |
+-----------------------+----------------------+
| Preferred Language    | Unknown              |
+-----------------------+----------------------+
| Marital Status        | Single               |
+-----------------------+----------------------+
| Latter day Affiliation | 1009                 |
+-----------------------+----------------------+
| Race                  | Unknown              |
+-----------------------+----------------------+
| Ethnic Group          | Unknown              |
+-----------------------+----------------------+
 
 
 Author
 
 
+--------------+--------------------------------------------+
| Author       | [facility]  |
|              | and [facility]                                |
+--------------+--------------------------------------------+
| Organization | [facility]  |
|              | and [facility]                                |
+--------------+--------------------------------------------+
| Address      | Unknown                                    |
+--------------+--------------------------------------------+
| Phone        | Unavailable                                |
+--------------+--------------------------------------------+
 
 
 
 Support
 
 
+----------------+--------------+---------------------+-----------------+
| Name           | Relationship | Address             | Phone           |
+----------------+--------------+---------------------+-----------------+
| Ada/Ed Radhames | ECON         | [place]              | [phone] |
|                |              | [place]  |                 |
|                |              |  [address]              |                 |
+----------------+--------------+---------------------+-----------------+
 
 
 
 
 Care Team Providers
 
 
+-----------------------+------+-------------+
| Care Team Member Name | Role | Phone       |
+-----------------------+------+-------------+
 PCP  | Unavailable |
+-----------------------+------+-------------+
 
 
 
 Reason for Visit
 
 
+-------------------+----------+
| Reason            | Comments |
+-------------------+----------+
| Medication Refill |          |
+-------------------+----------+
 
 
 
 Encounter Details
 
 
+--------+--------+---------------------+----------------------+-------------------+
| Date   | Type   | Department          | Care Team            | Description       |
+--------+--------+---------------------+----------------------+-------------------+
| / | Refill |   [facility]         |   Marzena Moser  | Medication Refill |
|    |        | NEPHROLOGY  [address]   | M, DO  [address]      |                   |
|        |        | [address]   | [address]      |                   |
|        |        | [place], [place]     | [place], [place]      |                   |
|        |        | [address]          | [address]  [phone]  |                   |
|        |        | [phone]        |  [phone] (Fax)  |                   |
+--------+--------+---------------------+----------------------+-------------------+
 
 
 
 Social History
 
 
+--------------+-------+-----------+--------+------+
| Tobacco Use  | Types | Packs/Day | Years  | Date |
|              |       |           | Used   |      |
+--------------+-------+-----------+--------+------+
| Never Smoker |       |           |        |      |
+--------------+-------+-----------+--------+------+
 
 
 
+---------------------+---+---+---+
| Smokeless Tobacco:  |   |   |   |
| Never Used          |   |   |   |
+---------------------+---+---+---+
 
 
 
+---------------------------------------------------------------+
| Comments: some second hand smoke exposure, but fairly minimal |
 
+---------------------------------------------------------------+
 
 
 
+-------------+-------------+---------+----------+
| Alcohol Use | Drinks/Week | oz/Week | Comments |
+-------------+-------------+---------+----------+
| No          |             |         |          |
+-------------+-------------+---------+----------+
 
 
 
+------------------+---------------+
| Sex Assigned at  | Date Recorded |
| Birth            |               |
+------------------+---------------+
| Not on file      |               |
+------------------+---------------+
 
 
 
+----------------+-------------+-------------+
| Job Start Date | Occupation  | Industry    |
+----------------+-------------+-------------+
| Not on file    | Not on file | Not on file |
+----------------+-------------+-------------+
 
 
 
+----------------+--------------+------------+
| Travel History | Travel Start | Travel End |
+----------------+--------------+------------+
 
 
 
+-------------------------------------+
| No recent travel history available. |
+-------------------------------------+
 documented as of this encounter
 
 Plan of Treatment
 
 
+--------+-----------+------------+----------------------+-------------------+
| Date   | Type      | Specialty  | Care Team            | Description       |
+--------+-----------+------------+----------------------+-------------------+
| / | Office    | Cardiology |   HellalfredoTonia,    |                   |
|    | Visit     |            | ARNP  [address]   |                   |
|        |           |            | St  [place], [place]  |                   |
|        |           |            | [address]  [phone]  |                   |
|        |           |            |  [phone] (Fax)  |                   |
+--------+-----------+------------+----------------------+-------------------+
| / | Hospital  | Radiology  |   Popeye Townsend, |                   |
|    | Encounter |            |  MD  [address] |                   |
|        |           |            |  St.  [place],   |                   |
|        |           |            | [place] [address]             |                   |
|        |           |            | [phone]         |                   |
|        |           |            | [phone] (Fax)   |                   |
+--------+-----------+------------+----------------------+-------------------+
| / | Surgery   | Radiology  |   Popeye Townsend, | CV EP PPM SYSTEM  |
 
|    |           |            |  MD  [address] | IMPLANT           |
|        |           |            |  St.  [place],   |                   |
|        |           |            | [place] [address]             |                   |
|        |           |            | [phone]         |                   |
|        |           |            | [phone] (Fax)   |                   |
+--------+-----------+------------+----------------------+-------------------+
| 02/10/ | Clinical  | Cardiology |                      |                   |
|    | Support   |            |                      |                   |
+--------+-----------+------------+----------------------+-------------------+
| / | Office    | Cardiology |   Tonia Wilson,    |                   |
|    | Visit     |            | ARNP  [address]   |                   |
|        |           |            | [place]  |                   |
|        |           |            | [address]  [phone]  |                   |
|        |           |            |  [phone] (Fax)  |                   |
+--------+-----------+------------+----------------------+-------------------+
| / | Off-Site  | Nephrology |   Marzena Moser  |                   |
|    | Visit     |            | DO ETIENNE  [address] [place] [address]      |                   |
|        |           |            | [address]      |                   |
|        |           |            | [place]      |                   |
|        |           |            | [address]  [phone]  |                   |
|        |           |            |  [phone] (Fax)  |                   |
+--------+-----------+------------+----------------------+-------------------+
 documented as of this encounter
 
 Visit Diagnoses
 Not on filedocumented in this encounter

## 2020-01-08 NOTE — XMS
Encounter Summary
  Created on: 2020
 
 Viviana Ricci
 External Reference #: [id number]
 : 47
 Sex: Female
 
 Demographics
 
 
+-----------------------+----------------------+
| Address               | [address]      |
|                       | [place]  [address] |
+-----------------------+----------------------+
| Home Phone            | [phone]      |
+-----------------------+----------------------+
| Preferred Language    | Unknown              |
+-----------------------+----------------------+
| Marital Status        | Single               |
+-----------------------+----------------------+
| Protestant Affiliation | 1009                 |
+-----------------------+----------------------+
| Race                  | Unknown              |
+-----------------------+----------------------+
| Ethnic Group          | Unknown              |
+-----------------------+----------------------+
 
 
 Author
 
 
+--------------+--------------------------------------------+
| Author       | [facility]  |
|              | and [facility]                                |
+--------------+--------------------------------------------+
| Organization | [facility]  |
|              | and [facility]                                |
+--------------+--------------------------------------------+
| Address      | Unknown                                    |
+--------------+--------------------------------------------+
| Phone        | Unavailable                                |
+--------------+--------------------------------------------+
 
 
 
 Support
 
 
+----------------+--------------+---------------------+-----------------+
| Name           | Relationship | Address             | Phone           |
+----------------+--------------+---------------------+-----------------+
| Ada/Ed Radhames | ECON         | [place]              | [phone] |
|                |              | [place]  |                 |
|                |              |  [address]              |                 |
+----------------+--------------+---------------------+-----------------+
 
 
 
 
 Care Team Providers
 
 
+------------------------+------+-----------------+
| Care Team Member Name  | Role | Phone           |
+------------------------+------+-----------------+
| Marzena Moser DO | PCP  | [phone] |
+------------------------+------+-----------------+
 
 
 
 Reason for Visit
 
 
+---------+----------+
| Reason  | Comments |
+---------+----------+
| Results |          |
+---------+----------+
 
 
 
 Encounter Details
 
 
+--------+-----------+----------------------+----------------------+-------------+
| Date   | Type      | Department           | Care Team            | Description |
+--------+-----------+----------------------+----------------------+-------------+
| 10/31/ | Telephone |   [facility] [place]          |   Tonia Wilson,    | Results     |
| 2019   |           | CARDIOLOGY  [address]    | ARNP  [address]   |             |
|        |           | [address]  [place] [place]  [place], [place]  |             |
|        |           |  [place] [address]       | [address]  [phone]  |             |
|        |           | [phone]         |  [phone] (Fax)  |             |
+--------+-----------+----------------------+----------------------+-------------+
 
 
 
 Social History
 
 
+--------------+-------+-----------+--------+------+
| Tobacco Use  | Types | Packs/Day | Years  | Date |
|              |       |           | Used   |      |
+--------------+-------+-----------+--------+------+
| Never Smoker |       |           |        |      |
+--------------+-------+-----------+--------+------+
 
 
 
+---------------------+---+---+---+
| Smokeless Tobacco:  |   |   |   |
| Never Used          |   |   |   |
+---------------------+---+---+---+
 
 
 
+---------------------------------------------------------------+
| Comments: some second hand smoke exposure, but fairly minimal |
+---------------------------------------------------------------+
 
 
 
 
+-------------+-------------+---------+----------+
| Alcohol Use | Drinks/Week | oz/Week | Comments |
+-------------+-------------+---------+----------+
| No          |             |         |          |
+-------------+-------------+---------+----------+
 
 
 
+------------------+---------------+
| Sex Assigned at  | Date Recorded |
| Birth            |               |
+------------------+---------------+
| Not on file      |               |
+------------------+---------------+
 
 
 
+----------------+-------------+-------------+
| Job Start Date | Occupation  | Industry    |
+----------------+-------------+-------------+
| Not on file    | Not on file | Not on file |
+----------------+-------------+-------------+
 
 
 
+----------------+--------------+------------+
| Travel History | Travel Start | Travel End |
+----------------+--------------+------------+
 
 
 
+-------------------------------------+
| No recent travel history available. |
+-------------------------------------+
 documented as of this encounter
 
 Plan of Treatment
 
 
+--------+-----------+------------+----------------------+-------------------+
| Date   | Type      | Specialty  | Care Team            | Description       |
+--------+-----------+------------+----------------------+-------------------+
| / | Office    | Cardiology |   Tonia Wilson,    |                   |
|    | Visit     |            | ARNJESSICA  [address]   |                   |
|        |           |            | St  [place], [place]  |                   |
|        |           |            | [address]  [phone]  |                   |
|        |           |            |  [phone] (Fax)  |                   |
+--------+-----------+------------+----------------------+-------------------+
| / | Hospital  | Radiology  |   Popeye Townsend, |                   |
|    | Encounter |            |  MD  [address] |                   |
|        |           |            |  St.  [place],   |                   |
|        |           |            | [place] [address]             |                   |
|        |           |            | [phone]         |                   |
|        |           |            | [phone] (Fax)   |                   |
+--------+-----------+------------+----------------------+-------------------+
| / | Surgery   | Radiology  |   Popeye Townsend, | CV EP PPM SYSTEM  |
|    |           |            |  MD  [address] | IMPLANT           |
 
|        |           |            |  St.  [place],   |                   |
|        |           |            | [place] [address]             |                   |
|        |           |            | [phone]         |                   |
|        |           |            | [phone] (Fax)   |                   |
+--------+-----------+------------+----------------------+-------------------+
| 02/10/ | Clinical  | Cardiology |                      |                   |
|    | Support   |            |                      |                   |
+--------+-----------+------------+----------------------+-------------------+
| / | Office    | Cardiology |   Tonia Wilson,    |                   |
|    | Visit     |            | BEKLIS  [address]   |                   |
|        |           |            | [place]  |                   |
|        |           |            | [address]  [phone]  |                   |
|        |           |            |  [phone] (Fax)  |                   |
+--------+-----------+------------+----------------------+-------------------+
| / | Off-Site  | Nephrology |   Marzena Moser  |                   |
|    | Visit     |            | DO ETIENNE  [address] [place] [address]      |                   |
|        |           |            | [address]      |                   |
|        |           |            | [place]      |                   |
|        |           |            | [address] [phone]  |                   |
|        |           |            |  [phone] (Fax)  |                   |
+--------+-----------+------------+----------------------+-------------------+
 documented as of this encounter
 
 Visit Diagnoses
 Not on filedocumented in this encounter

## 2020-01-08 NOTE — XMS
Encounter Summary
  Created on: 2020
 
 Viviana Ricci
 External Reference #: [id number]
 : 47
 Sex: Female
 
 Demographics
 
 
+-----------------------+----------------------+
| Address               | [address]      |
|                       | [place]  [address] |
+-----------------------+----------------------+
| Home Phone            | [phone]      |
+-----------------------+----------------------+
| Preferred Language    | Unknown              |
+-----------------------+----------------------+
| Marital Status        | Single               |
+-----------------------+----------------------+
| Druze Affiliation | 1009                 |
+-----------------------+----------------------+
| Race                  | Unknown              |
+-----------------------+----------------------+
| Ethnic Group          | Unknown              |
+-----------------------+----------------------+
 
 
 Author
 
 
+--------------+--------------------------------------------+
| Author       | [facility]  |
|              | and [facility]                                |
+--------------+--------------------------------------------+
| Organization | [facility]  |
|              | and [place]                                |
+--------------+--------------------------------------------+
| Address      | Unknown                                    |
+--------------+--------------------------------------------+
| Phone        | Unavailable                                |
+--------------+--------------------------------------------+
 
 
 
 Support
 
 
+----------------+--------------+---------------------+-----------------+
| Name           | Relationship | Address             | Phone           |
+----------------+--------------+---------------------+-----------------+
| Ada/Ed Radhames | ECON         | [place]              | [phone] |
|                |              | [place], [place]  |                 |
|                |              |  [address]              |                 |
+----------------+--------------+---------------------+-----------------+
 
 
 
 
 Care Team Providers
 
 
+-----------------------+------+-------------+
| Care Team Member Name | Role | Phone       |
+-----------------------+------+-------------+
 PCP  | Unavailable |
+-----------------------+------+-------------+
 
 
 
 Encounter Details
 
 
+--------+-----------+----------------------+----------------------+-------------+
| Date   | Type      | Department           | Care Team            | Description |
+--------+-----------+----------------------+----------------------+-------------+
| / | [facility]  |   [facility] |   Popeye Townsend, |             |
|    | Encounter |  MED CTR XRAY  [address] |  MD  [address] |             |
|        |           |  [address]  [place]       |  .  [place],   |             |
|        |           | [place] [address] | [place] [address]             |             |
|        |           |   [phone]       | [phone]         |             |
|        |           |                      | [phone] (Fax)   |             |
+--------+-----------+----------------------+----------------------+-------------+
 
 
 
 Social History
 
 
+----------------+-------+-----------+--------+------+
| Tobacco Use    | Types | Packs/Day | Years  | Date |
|                |       |           | Used   |      |
+----------------+-------+-----------+--------+------+
| Never Assessed |       |           |        |      |
+----------------+-------+-----------+--------+------+
 
 
 
+------------------+---------------+
| Sex Assigned at  | Date Recorded |
| Birth            |               |
+------------------+---------------+
| Not on file      |               |
+------------------+---------------+
 
 
 
+----------------+-------------+-------------+
| Job Start Date | Occupation  | Industry    |
+----------------+-------------+-------------+
| Not on file    | Not on file | Not on file |
+----------------+-------------+-------------+
 
 
 
+----------------+--------------+------------+
| Travel History | Travel Start | Travel End |
+----------------+--------------+------------+
 
 
 
 
+-------------------------------------+
| No recent travel history available. |
+-------------------------------------+
 documented as of this encounter
 
 Plan of Treatment
 
 
+--------+-----------+------------+----------------------+-------------------+
| Date   | Type      | Specialty  | Care Team            | Description       |
+--------+-----------+------------+----------------------+-------------------+
| / | Office    | Cardiology |   Tonia Wilson,    |                   |
|    | Visit     |            | ARNJESSICA  [address]   |                   |
|        |           |            | St  [place], [place]  |                   |
|        |           |            | [address]  [phone]  |                   |
|        |           |            |  [phone] (Fax)  |                   |
+--------+-----------+------------+----------------------+-------------------+
| / | Hospital  | Radiology  |   Popeye Townsend, |                   |
|    | Encounter |            |  MD  [address] |                   |
|        |           |            |  St. [place],   |                   |
|        |           |            | [place] [address]             |                   |
|        |           |            | [phone]         |                   |
|        |           |            | [phone] (Fax)   |                   |
+--------+-----------+------------+----------------------+-------------------+
| / | Surgery   | Radiology  |   Popeye Townsend, | CV EP PPM SYSTEM  |
|    |           |            |  MD  [address] | IMPLANT           |
|        |           |            |  [place],   |                   |
|        |           |            | [place] [address]             |                   |
|        |           |            | [phone]         |                   |
|        |           |            | [phone] (Fax)   |                   |
+--------+-----------+------------+----------------------+-------------------+
| 02/10/ | Clinical  | Cardiology |                      |                   |
|    | Support   |            |                      |                   |
+--------+-----------+------------+----------------------+-------------------+
| / | Office    | Cardiology |   Tonia Wilson,    |                   |
|    | Visit     |            | BELKIS  [address]   |                   |
|        |           |            | [place]  |                   |
|        |           |            | [address]  [phone]  |                   |
|        |           |            |  [phone] (Fax)  |                   |
+--------+-----------+------------+----------------------+-------------------+
| / | Off-Site  | Nephrology |   Marzena Moser  |                   |
|    | Visit     |            | DO ETIENNE  [address] [place] [address]      |                   |
|        |           |            | [address]      |                   |
|        |           |            | [place]      |                   |
|        |           |            | [address] [phone]  |                   |
|        |           |            |  [phone] (Fax)  |                   |
+--------+-----------+------------+----------------------+-------------------+
 documented as of this encounter
 
 Visit Diagnoses
 Not on filedocumented in this encounter

## 2020-01-08 NOTE — XMS
Encounter Summary
  Created on: 2020
 
 Viviana Ricci
 External Reference #: [id number]
 : 47
 Sex: Female
 
 Demographics
 
 
+-----------------------+----------------------+
| Address               | [address]      |
|                       | [place]  [address] |
+-----------------------+----------------------+
| Home Phone            | [phone]      |
+-----------------------+----------------------+
| Preferred Language    | Unknown              |
+-----------------------+----------------------+
| Marital Status        | Single               |
+-----------------------+----------------------+
| Buddhist Affiliation | 1009                 |
+-----------------------+----------------------+
| Race                  | Unknown              |
+-----------------------+----------------------+
| Ethnic Group          | Unknown              |
+-----------------------+----------------------+
 
 
 Author
 
 
+--------------+--------------------------------------------+
| Author       | [facility]  |
|              | and [place]                                |
+--------------+--------------------------------------------+
| Organization | [facility]  |
|              | and [place]                                |
+--------------+--------------------------------------------+
| Address      | Unknown                                    |
+--------------+--------------------------------------------+
| Phone        | Unavailable                                |
+--------------+--------------------------------------------+
 
 
 
 Support
 
 
+----------------+--------------+---------------------+-----------------+
| Name           | Relationship | Address             | Phone           |
+----------------+--------------+---------------------+-----------------+
| Ada/Ed Radhames | ECON         | [address]              | [phone] |
|                |              | [place]  |                 |
|                |              |  [address]              |                 |
+----------------+--------------+---------------------+-----------------+
 
 
 
 
 Care Team Providers
 
 
+------------------------+------+-----------------+
| Care Team Member Name  | Role | Phone           |
+------------------------+------+-----------------+
| Marzena Moser DO | PCP  | [phone] |
+------------------------+------+-----------------+
 
 
 
 Reason for Referral
 Evaluate & Treat (Routine)
 
+--------+--------------+---------------+--------------+--------------+---------------+
| Status | Reason       | Specialty     | Diagnoses /  | Referred By  | Referred To   |
|        |              |               | Procedures   | Contact      | Contact       |
+--------+--------------+---------------+--------------+--------------+---------------+
| Closed |   Specialty  | Cardiac       |   Diagnoses  |   [facility] |   [facility]  |
|        | Services     | Rehabilitatio |  CHF         |  MD Popeye  | HOSPITAL      |
|        | Required     | n             | (congestive  |  [address]    | PHYSICAL      |
|        |              |               | heart        | [address]   | THERAPY  [address] |
|        |              |               | failure),    | [place], |  [place]    |
|        |              |               | NYHA class   |  [place] [address]    | [place], [place] |
|        |              |               | I, chronic,  | Phone:       |  [address]   |
|        |              |               | diastolic    | [phone] | Phone:        |
|        |              |               | (HCC)        |   Fax:       | [phone]  |
|        |              |               | Procedures   | [phone] |  Fax:         |
|        |              |               | [place]           |              | [phone]  |
|        |              |               | OUTPATIENT   |              |               |
|        |              |               | CARDIAC      |              |               |
|        |              |               | REHAB W/O    |              |               |
|        |              |               | CONT ECG     |              |               |
|        |              |               | MONITOR      |              |               |
|        |              |               |  >      |              |               |
|        |              |               | PENDING      |              |               |
|        |              |               | STATUS  ST[facility]  |              |               |
|        |              |               | [facility]      |              |               |
+--------+--------------+---------------+--------------+--------------+---------------+
 
 
 
 
 Encounter Details
 
 
+--------+-------------+----------------------+----------------------+----------------------
+
| Date   | Type        | Department           | Care Team            | Description          
|
+--------+-------------+----------------------+----------------------+----------------------
+
| 10/10/ | Orders Only |   [facility]          |   Popeye Townsend, | CHF (congestive      
|
| 2019   |             | CARDIOLOGY  [address]    |  MD  [address] | heart failure), NYHA 
|
|        |             | [address]  [place], |  St.  [place],   |  class I, chronic,   
|
|        |             |  [place] [address]       | [place] [address]             | diastolic (HCC)      
 
|
|        |             | [phone]         | [phone]         | (Primary Dx)         
|
|        |             |                      | [phone] (Fax)   |                      
|
+--------+-------------+----------------------+----------------------+----------------------
+
 
 
 
 Social History
 
 
+--------------+-------+-----------+--------+------+
| Tobacco Use  | Types | Packs/Day | Years  | Date |
|              |       |           | Used   |      |
+--------------+-------+-----------+--------+------+
| Never Smoker |       |           |        |      |
+--------------+-------+-----------+--------+------+
 
 
 
+---------------------+---+---+---+
| Smokeless Tobacco:  |   |   |   |
| Never Used          |   |   |   |
+---------------------+---+---+---+
 
 
 
+---------------------------------------------------------------+
| Comments: some second hand smoke exposure, but fairly minimal |
+---------------------------------------------------------------+
 
 
 
+-------------+-------------+---------+----------+
| Alcohol Use | Drinks/Week | oz/Week | Comments |
+-------------+-------------+---------+----------+
| No          |             |         |          |
+-------------+-------------+---------+----------+
 
 
 
+------------------+---------------+
| Sex Assigned at  | Date Recorded |
| Birth            |               |
+------------------+---------------+
| Not on file      |               |
+------------------+---------------+
 
 
 
+----------------+-------------+-------------+
| Job Start Date | Occupation  | Industry    |
+----------------+-------------+-------------+
| Not on file    | Not on file | Not on file |
+----------------+-------------+-------------+
 
 
 
 
+----------------+--------------+------------+
| Travel History | Travel Start | Travel End |
+----------------+--------------+------------+
 
 
 
+-------------------------------------+
| No recent travel history available. |
+-------------------------------------+
 documented as of this encounter
 
 Plan of Treatment
 
 
+--------+-----------+------------+----------------------+-------------------+
| Date   | Type      | Specialty  | Care Team            | Description       |
+--------+-----------+------------+----------------------+-------------------+
| / | Office    | Cardiology |   Tonia Wilson,    |                   |
|    | Visit     |            | ARNP  [address]   |                   |
|        |           |            | St [place], [place]  |                   |
|        |           |            | [address]  [phone]  |                   |
|        |           |            |  [phone] (Fax)  |                   |
+--------+-----------+------------+----------------------+-------------------+
| / | Hospital  | Radiology  |   Popeye Townsend, |                   |
|    | Encounter |            |  MD  [address] |                   |
|        |           |            |  [place],   |                   |
|        |           |            | [place] [address]             |                   |
|        |           |            | [phone]         |                   |
|        |           |            | [phone] (Fax)   |                   |
+--------+-----------+------------+----------------------+-------------------+
| / | Surgery   | Radiology  |   Popeye Townsend, | CV EP PPM SYSTEM  |
|    |           |            |  MD  [address] | IMPLANT           |
|        |           |            |  [place],   |                   |
|        |           |            | [place] [address]             |                   |
|        |           |            | [phone]         |                   |
|        |           |            | [phone] (Fax)   |                   |
+--------+-----------+------------+----------------------+-------------------+
| 02/10/ | Clinical  | Cardiology |                      |                   |
|    | Support   |            |                      |                   |
+--------+-----------+------------+----------------------+-------------------+
| / | Office    | Cardiology |   Tonia Wilson,    |                   |
|    | Visit     |            | [facility]  [address]   |                   |
|        |           |            | [place]  |                   |
|        |           |            | [address]  [phone]  |                   |
|        |           |            |  [phone] (Fax)  |                   |
+--------+-----------+------------+----------------------+-------------------+
| / | Off-Site  | Nephrology |   Marzena Moser  |                   |
|    | Visit     |            | DO ETIENNE  [address] [place] [address]      |                   |
|        |           |            | [address]      |                   |
|        |           |            | [place]      |                   |
|        |           |            | [id number] [phone]  |                   |
|        |           |            |  [phone] (Fax)  |                   |
+--------+-----------+------------+----------------------+-------------------+
 
 
 
+----------------------+-------------+--------+----------------------+----------------------
+
| Name                 | Type        | Priori | Associated Diagnoses | Order Schedule       
|
 
|                      |             | ty     |                      |                      
|
+----------------------+-------------+--------+----------------------+----------------------
+
| Referral to Cardiac  | Outpatient  | Routin |   CHF (congestive    | 1 Occurrences        
|
| Rehab                | Referral    | e      | heart failure), NYHA | starting 10/10/2019  
|
|                      |             |        |  class I, chronic,   | until 10/09/2020     
|
|                      |             |        | diastolic (HCC)      |                      
|
+----------------------+-------------+--------+----------------------+----------------------
+
 documented as of this encounter
 
 Visit Diagnoses
 
 
+------------------------------------------------------------------------------------+
| Diagnosis                                                                          |
+------------------------------------------------------------------------------------+
|   CHF (congestive heart failure), NYHA class I, chronic, diastolic (HCC) - Primary |
+------------------------------------------------------------------------------------+
 documented in this encounter

## 2020-01-08 NOTE — XMS
Encounter Summary
  Created on: 2020
 
 Viviana Ricci
 External Reference #: [id number]
 : 47
 Sex: Female
 
 Demographics
 
 
+-----------------------+----------------------+
| Address               | [address]      |
|                       | [place]  [address] |
+-----------------------+----------------------+
| Home Phone            | [phone]      |
+-----------------------+----------------------+
| Preferred Language    | Unknown              |
+-----------------------+----------------------+
| Marital Status        | Single               |
+-----------------------+----------------------+
| Mu-ism Affiliation | [id number]                 |
+-----------------------+----------------------+
| Race                  | Unknown              |
+-----------------------+----------------------+
| Ethnic Group          | Unknown              |
+-----------------------+----------------------+
 
 
 Author
 
 
+--------------+--------------------------------------------+
| Author       | [facility]  |
|              | and [place]                                |
+--------------+--------------------------------------------+
| Organization | [facility]  |
|              | and [place]                                |
+--------------+--------------------------------------------+
| Address      | Unknown                                    |
+--------------+--------------------------------------------+
| Phone        | Unavailable                                |
+--------------+--------------------------------------------+
 
 
 
 Support
 
 
+----------------+--------------+---------------------+-----------------+
| Name           | Relationship | Address             | Phone           |
+----------------+--------------+---------------------+-----------------+
| Ada/Ed Radhames | ECON         | [place]              | [phone] |
|                |              | [place], [place]  |                 |
|                |              |  [address]              |                 |
+----------------+--------------+---------------------+-----------------+
 
 
 
 
 Care Team Providers
 
 
+-----------------------+------+-------------+
| Care Team Member Name | Role | Phone       |
+-----------------------+------+-------------+
 PCP  | Unavailable |
+-----------------------+------+-------------+
 
 
 
 Encounter Details
 
 
+--------+-----------+----------------------+----------------------+-------------+
| Date   | Type      | Department           | Care Team            | Description |
+--------+-----------+----------------------+----------------------+-------------+
| / | Hospital  |   [facility] |   Edgar Sanders,   |             |
|    | Encounter |  [facility] OP | MD  [address]      |             |
|        |           |   [address]       | [place]      |             |
|        |           | [place]      | [address]  [phone]  |             |
|        |           | [address]           |  [phone] (Fax)  |             |
|        |           | [phone]         |                      |             |
+--------+-----------+----------------------+----------------------+-------------+
 
 
 
 Social History
 
 
+----------------+-------+-----------+--------+------+
| Tobacco Use    | Types | Packs/Day | Years  | Date |
|                |       |           | Used   |      |
+----------------+-------+-----------+--------+------+
| Never Assessed |       |           |        |      |
+----------------+-------+-----------+--------+------+
 
 
 
+------------------+---------------+
| Sex Assigned at  | Date Recorded |
| Birth            |               |
+------------------+---------------+
| Not on file      |               |
+------------------+---------------+
 
 
 
+----------------+-------------+-------------+
| Job Start Date | Occupation  | Industry    |
+----------------+-------------+-------------+
| Not on file    | Not on file | Not on file |
+----------------+-------------+-------------+
 
 
 
+----------------+--------------+------------+
| Travel History | Travel Start | Travel End |
+----------------+--------------+------------+
 
 
 
 
+-------------------------------------+
| No recent travel history available. |
+-------------------------------------+
 documented as of this encounter
 
 Plan of Treatment
 
 
+--------+-----------+------------+----------------------+-------------------+
| Date   | Type      | Specialty  | Care Team            | Description       |
+--------+-----------+------------+----------------------+-------------------+
| / | Office    | Cardiology |   Tonia Wilson,    |                   |
|    | Visit     |            | ARNP  [address]   |                   |
|        |           |            | St  [place], [place]  |                   |
|        |           |            | [address]  [phone]  |                   |
|        |           |            |  [phone] (Fax)  |                   |
+--------+-----------+------------+----------------------+-------------------+
| / | Hospital  | Radiology  |   Popeye Townsend, |                   |
|    | Encounter |            |  MD  [address] |                   |
|        |           |            |  [place],   |                   |
|        |           |            | [place] [address]             |                   |
|        |           |            | [phone]         |                   |
|        |           |            | [phone] (Fax)   |                   |
+--------+-----------+------------+----------------------+-------------------+
| / | Surgery   | Radiology  |   Popeye Townsend, | CV EP PPM SYSTEM  |
|    |           |            |  MD  [address] | IMPLANT           |
|        |           |            |  [place],   |                   |
|        |           |            | [place] [address]             |                   |
|        |           |            | [phone]         |                   |
|        |           |            | [phone] (Fax)   |                   |
+--------+-----------+------------+----------------------+-------------------+
| 02/10/ | Clinical  | Cardiology |                      |                   |
|    | Support   |            |                      |                   |
+--------+-----------+------------+----------------------+-------------------+
| / | Office    | Cardiology |   Tonia Wilson,    |                   |
|    | Visit     |            | ARNP  [address]   |                   |
|        |           |            | [place]  |                   |
|        |           |            | [address]  [phone]  |                   |
|        |           |            |  [phone] (Fax)  |                   |
+--------+-----------+------------+----------------------+-------------------+
| / | Off-Site  | Nephrology |   Marzena Moser  |                   |
|    | Visit     |            | DO ETIENNE  [address] [place] [address]      |                   |
|        |           |            | [address]      |                   |
|        |           |            | [place]      |                   |
|        |           |            | [address] [phone]  |                   |
|        |           |            |  [phone] (Fax)  |                   |
+--------+-----------+------------+----------------------+-------------------+
 documented as of this encounter
 
 Visit Diagnoses
 Not on filedocumented in this encounter

## 2020-01-08 NOTE — XMS
Encounter Summary
  Created on: 2020
 
 Viviana Ricci
 External Reference #: [id number]
 : 47
 Sex: Female
 
 Demographics
 
 
+-----------------------+----------------------+
| Address               | [address]      |
|                       | [place]  [address] |
+-----------------------+----------------------+
| Home Phone            | [phone]      |
+-----------------------+----------------------+
| Preferred Language    | Unknown              |
+-----------------------+----------------------+
| Marital Status        | Single               |
+-----------------------+----------------------+
| Anabaptism Affiliation | [id number]                 |
+-----------------------+----------------------+
| Race                  | Unknown              |
+-----------------------+----------------------+
| Ethnic Group          | Unknown              |
+-----------------------+----------------------+
 
 
 Author
 
 
+--------------+--------------------------------------------+
| Author       |  and [facility]  |
|              | and [place]                                |
+--------------+--------------------------------------------+
| Organization |  and [facility]  |
|              | and [place]                                |
+--------------+--------------------------------------------+
| Address      | Unknown                                    |
+--------------+--------------------------------------------+
| Phone        | Unavailable                                |
+--------------+--------------------------------------------+
 
 
 
 Support
 
 
+----------------+--------------+---------------------+-----------------+
| Name           | Relationship | Address             | Phone           |
+----------------+--------------+---------------------+-----------------+
| Ada/Ed Radhames | ECON         | [address]              | [phone] |
|                |              | [place]  |                 |
|                |              |  [address]              |                 |
+----------------+--------------+---------------------+-----------------+
 
 
 
 
 Care Team Providers
 
 
+------------------------+------+-----------------+
| Care Team Member Name  | Role | Phone           |
+------------------------+------+-----------------+
| Marzena Moser DO | PCP  | [phone] |
+------------------------+------+-----------------+
 
 
 
 Reason for Visit
 
 
+---------+----------+
| Reason  | Comments |
+---------+----------+
| Results |          |
+---------+----------+
 
 
 
 Encounter Details
 
 
+--------+-----------+----------------------+----------------------+-------------+
| Date   | Type      | Department           | Care Team            | Description |
+--------+-----------+----------------------+----------------------+-------------+
| / | Telephone |   [facility]          |   Tonia Wilson,    | Results     |
| 2018   |           | CARDIOLOGY  [address]    | ARNP  [address]   |             |
|        |           | [address]  [place], | St  [place], [place]  |             |
|        |           |  [place] [address]       | [address]  [phone]  |             |
|        |           | [phone]         |  [phone] (Fax)  |             |
+--------+-----------+----------------------+----------------------+-------------+
 
 
 
 Social History
 
 
+--------------+-------+-----------+--------+------+
| Tobacco Use  | Types | Packs/Day | Years  | Date |
|              |       |           | Used   |      |
+--------------+-------+-----------+--------+------+
| Never Smoker |       |           |        |      |
+--------------+-------+-----------+--------+------+
 
 
 
+---------------------+---+---+---+
| Smokeless Tobacco:  |   |   |   |
| Never Used          |   |   |   |
+---------------------+---+---+---+
 
 
 
+---------------------------------------------------------------+
| Comments: some second hand smoke exposure, but fairly minimal |
+---------------------------------------------------------------+
 
 
 
 
+-------------+-------------+---------+----------+
| Alcohol Use | Drinks/Week | oz/Week | Comments |
+-------------+-------------+---------+----------+
| No          |             |         |          |
+-------------+-------------+---------+----------+
 
 
 
+------------------+---------------+
| Sex Assigned at  | Date Recorded |
| Birth            |               |
+------------------+---------------+
| Not on file      |               |
+------------------+---------------+
 
 
 
+----------------+-------------+-------------+
| Job Start Date | Occupation  | Industry    |
+----------------+-------------+-------------+
| Not on file    | Not on file | Not on file |
+----------------+-------------+-------------+
 
 
 
+----------------+--------------+------------+
| Travel History | Travel Start | Travel End |
+----------------+--------------+------------+
 
 
 
+-------------------------------------+
| No recent travel history available. |
+-------------------------------------+
 documented as of this encounter
 
 Plan of Treatment
 
 
+--------+-----------+------------+----------------------+-------------------+
| Date   | Type      | Specialty  | Care Team            | Description       |
+--------+-----------+------------+----------------------+-------------------+
| / | Office    | Cardiology |   Tonia Wilson,    |                   |
|    | Visit     |            | ARNJESSICA  [address]   |                   |
|        |           |            | St  [place], [place]  |                   |
|        |           |            | [address]  [phone]  |                   |
|        |           |            |  [phone] (Fax)  |                   |
+--------+-----------+------------+----------------------+-------------------+
| / | Hospital  | Radiology  |   Popeye Townsend, |                   |
|    | Encounter |            |  MD  [address] |                   |
|        |           |            |  St.  [place],   |                   |
|        |           |            | [place] [address]             |                   |
|        |           |            | [phone]         |                   |
|        |           |            | [phone] (Fax)   |                   |
+--------+-----------+------------+----------------------+-------------------+
| / | Surgery   | Radiology  |   Popeye Townsend, | CV EP PPM SYSTEM  |
|    |           |            |  MD  [address] | IMPLANT           |
 
|        |           |            |  St.  [place],   |                   |
|        |           |            | [place] [address]             |                   |
|        |           |            | [phone]         |                   |
|        |           |            | [phone] (Fax)   |                   |
+--------+-----------+------------+----------------------+-------------------+
| 02/10/ | Clinical  | Cardiology |                      |                   |
|    | Support   |            |                      |                   |
+--------+-----------+------------+----------------------+-------------------+
| / | Office    | Cardiology |   RakeshmaxxTonia fuentes,    |                   |
|    | Visit     |            | [facility]  [address]   |                   |
|        |           |            | [place]  |                   |
|        |           |            | [address]  [phone]  |                   |
|        |           |            |  [phone] (Fax)  |                   |
+--------+-----------+------------+----------------------+-------------------+
| / | Off-Site  | Nephrology |   Marzena Moser  |                   |
|    | Visit     |            | DO ETIENNE  [address] [place] [address]      |                   |
|        |           |            | [address]      |                   |
|        |           |            | [place]      |                   |
|        |           |            | [address]  [phone]  |                   |
|        |           |            |  [phone] (Fax)  |                   |
+--------+-----------+------------+----------------------+-------------------+
 documented as of this encounter
 
 Visit Diagnoses
 
 
+---------------------------------------------------------------+
| Diagnosis                                                     |
+---------------------------------------------------------------+
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
+---------------------------------------------------------------+
 documented in this encounter

## 2020-01-08 NOTE — XMS
Encounter Summary
  Created on: 2020
 
 Viviana Ricci
 External Reference #: [id number]
 : 47
 Sex: Female
 
 Demographics
 
 
+-----------------------+----------------------+
| Address               | [address]      |
|                       | [place]  [address] |
+-----------------------+----------------------+
| Home Phone            | [phone]      |
+-----------------------+----------------------+
| Preferred Language    | Unknown              |
+-----------------------+----------------------+
| Marital Status        | Single               |
+-----------------------+----------------------+
| Advent Affiliation | 1009                 |
+-----------------------+----------------------+
| Race                  | Unknown              |
+-----------------------+----------------------+
| Ethnic Group          | Unknown              |
+-----------------------+----------------------+
 
 
 Author
 
 
+--------------+--------------------------------------------+
| Author       | [facility]  |
|              | and [place]                                |
+--------------+--------------------------------------------+
| Organization | [facility] [place]  |
|              | and [place]                                |
+--------------+--------------------------------------------+
| Address      | Unknown                                    |
+--------------+--------------------------------------------+
| Phone        | Unavailable                                |
+--------------+--------------------------------------------+
 
 
 
 Support
 
 
+----------------+--------------+---------------------+-----------------+
| Name           | Relationship | Address             | Phone           |
+----------------+--------------+---------------------+-----------------+
| Ada/Ed Radhames | ECON         | [place]              | [phone] |
|                |              | [place]  |                 |
|                |              |  [address]              |                 |
+----------------+--------------+---------------------+-----------------+
 
 
 
 
 Care Team Providers
 
 
+-----------------------+------+-------------+
| Care Team Member Name | Role | Phone       |
+-----------------------+------+-------------+
 PCP  | Unavailable |
+-----------------------+------+-------------+
 
 
 
 Reason for Visit
 
 
+-------------------+----------+
| Reason            | Comments |
+-------------------+----------+
| Medication Refill |          |
+-------------------+----------+
 
 
 
 Encounter Details
 
 
+--------+--------+---------------------+----------------------+-------------------+
| Date   | Type   | Department          | Care Team            | Description       |
+--------+--------+---------------------+----------------------+-------------------+
| / | Refill |   [facility]         |   Marzena Moser  | Medication Refill |
|    |        | NEPHROLOGY  [address]   | M, DO  [address]      |                   |
|        |        | [address]   | [address]      |                   |
|        |        | [place], [place]     | [place], [place]      |                   |
|        |        | [address]          | [address]  [phone]  |                   |
|        |        | [phone]        |  [phone] (Fax)  |                   |
+--------+--------+---------------------+----------------------+-------------------+
 
 
 
 Social History
 
 
+--------------+-------+-----------+--------+------+
| Tobacco Use  | Types | Packs/Day | Years  | Date |
|              |       |           | Used   |      |
+--------------+-------+-----------+--------+------+
| Never Smoker |       |           |        |      |
+--------------+-------+-----------+--------+------+
 
 
 
+---------------------+---+---+---+
| Smokeless Tobacco:  |   |   |   |
| Never Used          |   |   |   |
+---------------------+---+---+---+
 
 
 
+---------------------------------------------------------------+
| Comments: some second hand smoke exposure, but fairly minimal |
 
+---------------------------------------------------------------+
 
 
 
+-------------+-------------+---------+----------+
| Alcohol Use | Drinks/Week | oz/Week | Comments |
+-------------+-------------+---------+----------+
| No          |             |         |          |
+-------------+-------------+---------+----------+
 
 
 
+------------------+---------------+
| Sex Assigned at  | Date Recorded |
| Birth            |               |
+------------------+---------------+
| Not on file      |               |
+------------------+---------------+
 
 
 
+----------------+-------------+-------------+
| Job Start Date | Occupation  | Industry    |
+----------------+-------------+-------------+
| Not on file    | Not on file | Not on file |
+----------------+-------------+-------------+
 
 
 
+----------------+--------------+------------+
| Travel History | Travel Start | Travel End |
+----------------+--------------+------------+
 
 
 
+-------------------------------------+
| No recent travel history available. |
+-------------------------------------+
 documented as of this encounter
 
 Plan of Treatment
 
 
+--------+-----------+------------+----------------------+-------------------+
| Date   | Type      | Specialty  | Care Team            | Description       |
+--------+-----------+------------+----------------------+-------------------+
| / | Office    | Cardiology |   HellalfredoTonia,    |                   |
|    | Visit     |            | ARNP  [address]   |                   |
|        |           |            | St  [place], [place]  |                   |
|        |           |            | [address]  [phone]  |                   |
|        |           |            |  [phone] (Fax)  |                   |
+--------+-----------+------------+----------------------+-------------------+
| / | Hospital  | Radiology  |   Popeye Townsend, |                   |
|    | Encounter |            |  MD  [address] |                   |
|        |           |            |  St.  [place],   |                   |
|        |           |            | [place] [address]             |                   |
|        |           |            | [phone]         |                   |
|        |           |            | [phone] (Fax)   |                   |
+--------+-----------+------------+----------------------+-------------------+
| / | Surgery   | Radiology  |   Popeye Townsend, | CV EP PPM SYSTEM  |
 
|    |           |            |  MD  [address] | IMPLANT           |
|        |           |            |  St.  [place],   |                   |
|        |           |            | [place] [address]             |                   |
|        |           |            | [phone]         |                   |
|        |           |            | [phone] (Fax)   |                   |
+--------+-----------+------------+----------------------+-------------------+
| 02/10/ | Clinical  | Cardiology |                      |                   |
|    | Support   |            |                      |                   |
+--------+-----------+------------+----------------------+-------------------+
| / | Office    | Cardiology |   Tonia Wilson,    |                   |
|    | Visit     |            | ARNP  [address]   |                   |
|        |           |            | [place]  |                   |
|        |           |            | [address]  [phone]  |                   |
|        |           |            |  [phone] (Fax)  |                   |
+--------+-----------+------------+----------------------+-------------------+
| / | Off-Site  | Nephrology |   Marzena Moser  |                   |
|    | Visit     |            | DO ETIENNE  [address] [place] [address]      |                   |
|        |           |            | [address]      |                   |
|        |           |            | [place]      |                   |
|        |           |            | [address]  [phone]  |                   |
|        |           |            |  [phone] (Fax)  |                   |
+--------+-----------+------------+----------------------+-------------------+
 documented as of this encounter
 
 Visit Diagnoses
 Not on filedocumented in this encounter

## 2020-01-08 NOTE — XMS
Encounter Summary
  Created on: 2020
 
 Viviana Ricci
 External Reference #: [id number]
 : 47
 Sex: Female
 
 Demographics
 
 
+-----------------------+----------------------+
| Address               | [address]      |
|                       | [place]  [address] |
+-----------------------+----------------------+
| Home Phone            | [phone]      |
+-----------------------+----------------------+
| Preferred Language    | Unknown              |
+-----------------------+----------------------+
| Marital Status        | Single               |
+-----------------------+----------------------+
| Orthodoxy Affiliation | 1009                 |
+-----------------------+----------------------+
| Race                  | Unknown              |
+-----------------------+----------------------+
| Ethnic Group          | Unknown              |
+-----------------------+----------------------+
 
 
 Author
 
 
+--------------+--------------------------------------------+
| Author       | [facility] [place]  |
|              | and [place]                                |
+--------------+--------------------------------------------+
| Organization | [facility]  |
|              | and [place]                                |
+--------------+--------------------------------------------+
| Address      | Unknown                                    |
+--------------+--------------------------------------------+
| Phone        | Unavailable                                |
+--------------+--------------------------------------------+
 
 
 
 Support
 
 
+----------------+--------------+---------------------+-----------------+
| Name           | Relationship | Address             | Phone           |
+----------------+--------------+---------------------+-----------------+
| Ada/Ed Radhames | ECON         | [address]              | [phone] |
|                |              | [place]  |                 |
|                |              |  [address]              |                 |
+----------------+--------------+---------------------+-----------------+
 
 
 
 
 Care Team Providers
 
 
+-----------------------+------+-------------+
| Care Team Member Name | Role | Phone       |
+-----------------------+------+-------------+
 PCP  | Unavailable |
+-----------------------+------+-------------+
 
 
 
 Encounter Details
 
 
+--------+-----------+----------------------+----------------------+-------------+
| Date   | Type      | Department           | Care Team            | Description |
+--------+-----------+----------------------+----------------------+-------------+
| / | [facility]  |   [facility] |   Marzena Moser  |             |
|    | Encounter |  MED CTR XRAY  [address] | M, DO  [address]      |             |
|        |           |  [place]       | [address]      |             |
|        |           | [place] [address] | [place]      |             |
|        |           |   [phone]       | [address] [phone]  |             |
|        |           |                      |  [phone] (Fax)  |             |
+--------+-----------+----------------------+----------------------+-------------+
 
 
 
 Social History
 
 
+----------------+-------+-----------+--------+------+
| Tobacco Use    | Types | Packs/Day | Years  | Date |
|                |       |           | Used   |      |
+----------------+-------+-----------+--------+------+
| Never Assessed |       |           |        |      |
+----------------+-------+-----------+--------+------+
 
 
 
+------------------+---------------+
| Sex Assigned at  | Date Recorded |
| Birth            |               |
+------------------+---------------+
| Not on file      |               |
+------------------+---------------+
 
 
 
+----------------+-------------+-------------+
| Job Start Date | Occupation  | Industry    |
+----------------+-------------+-------------+
| Not on file    | Not on file | Not on file |
+----------------+-------------+-------------+
 
 
 
+----------------+--------------+------------+
| Travel History | Travel Start | Travel End |
+----------------+--------------+------------+
 
 
 
 
+-------------------------------------+
| No recent travel history available. |
+-------------------------------------+
 documented as of this encounter
 
 Plan of Treatment
 
 
+--------+-----------+------------+----------------------+-------------------+
| Date   | Type      | Specialty  | Care Team            | Description       |
+--------+-----------+------------+----------------------+-------------------+
| / | Office    | Cardiology |   Tonia Wilson,    |                   |
|    | Visit     |            | ARNJESSICA  [address]   |                   |
|        |           |            | St  [place], [place]  |                   |
|        |           |            | [address]  [phone]  |                   |
|        |           |            |  [phone] (Fax)  |                   |
+--------+-----------+------------+----------------------+-------------------+
| / | Hospital  | Radiology  |   Popeye Townsend, |                   |
|    | Encounter |            |  MD  [address] |                   |
|        |           |            |  St. [place],   |                   |
|        |           |            | [place] [address]             |                   |
|        |           |            | [phone]         |                   |
|        |           |            | [phone] (Fax)   |                   |
+--------+-----------+------------+----------------------+-------------------+
| / | Surgery   | Radiology  |   Popeye Townsend, | CV EP PPM SYSTEM  |
|    |           |            |  MD  [address] | IMPLANT           |
|        |           |            |  [place],   |                   |
|        |           |            | [place] [address]             |                   |
|        |           |            | [phone]         |                   |
|        |           |            | [phone] (Fax)   |                   |
+--------+-----------+------------+----------------------+-------------------+
| 02/10/ | Clinical  | Cardiology |                      |                   |
|    | Support   |            |                      |                   |
+--------+-----------+------------+----------------------+-------------------+
| / | Office    | Cardiology |   Tonia Wilson,    |                   |
|    | Visit     |            | BELKIS  [address]   |                   |
|        |           |            | [place]  |                   |
|        |           |            | [address]  [phone]  |                   |
|        |           |            |  [phone] (Fax)  |                   |
+--------+-----------+------------+----------------------+-------------------+
| / | Off-Site  | Nephrology |   Marzena Moser  |                   |
|    | Visit     |            | DO ETIENNE  [address] [place] [address]      |                   |
|        |           |            | [address]      |                   |
|        |           |            | [place]      |                   |
|        |           |            | [address] [phone]  |                   |
|        |           |            |  [phone] (Fax)  |                   |
+--------+-----------+------------+----------------------+-------------------+
 documented as of this encounter
 
 Visit Diagnoses
 Not on filedocumented in this encounter

## 2020-01-08 NOTE — XMS
Encounter Summary
  Created on: 2020
 
 Viviana Ricci
 External Reference #: [id number]
 : 47
 Sex: Female
 
 Demographics
 
 
+-----------------------+------------------------+
| Address               | [address]           |
|                       | [place]  [address]   |
+-----------------------+------------------------+
| Home Phone            | [phone]        |
+-----------------------+------------------------+
| Preferred Language    | Unknown                |
+-----------------------+------------------------+
| Marital Status        | Single                 |
+-----------------------+------------------------+
| Hoahaoism Affiliation | CAT                    |
+-----------------------+------------------------+
| Race                  | White                  |
+-----------------------+------------------------+
| Ethnic Group          | Not  or  |
+-----------------------+------------------------+
 
 
 Author
 
 
+--------------+------------------------------+
| Author       | [facility] |
+--------------+------------------------------+
| Organization | [facility] |
+--------------+------------------------------+
| Address      | Unknown                      |
+--------------+------------------------------+
| Phone        | Unavailable                  |
+--------------+------------------------------+
 
 
 
 Support
 
 
+---------------+--------------+-----------------+-----------------+
| Name          | Relationship | Address         | Phone           |
+---------------+--------------+-----------------+-----------------+
| Ember Ricci | [address]         | [place]   | [phone] |
+---------------+--------------+-----------------+-----------------+
 
 
 
 Care Team Providers
 
 
 
+-----------------------+------+-------------+
| Care Team Member Name | Role | Phone       |
+-----------------------+------+-------------+
 PCP  | Unavailable |
+-----------------------+------+-------------+
 
 
 
 Encounter Details
 
 
+--------+----------+----------------------+--------------------+-------------+
| Date   | Type     | Department           | Care Team          | Description |
+--------+----------+----------------------+--------------------+-------------+
| 11/10/ | Results  |   [facility]  [address]  |   Other, Faculty   |             |
|    | Only     | [address]  | [phone]       |             |
|        |          |  [place], [place]        |                    |             |
|        |          | [address]           |                    |             |
|        |          | [phone]         |                    |             |
+--------+----------+----------------------+--------------------+-------------+
 
 
 
 Social History
 
 
+----------------+-------+-----------+--------+------+
| Tobacco Use    | Types | Packs/Day | Years  | Date |
|                |       |           | Used   |      |
+----------------+-------+-----------+--------+------+
| Never Assessed |       |           |        |      |
+----------------+-------+-----------+--------+------+
 
 
 
+------------------+---------------+
| Sex Assigned at  | Date Recorded |
| Birth            |               |
+------------------+---------------+
| Not on file      |               |
+------------------+---------------+
 
 
 
+----------------+-------------+-------------+
| Job Start Date | Occupation  | Industry    |
+----------------+-------------+-------------+
| Not on file    | Not on file | Not on file |
+----------------+-------------+-------------+
 
 
 
+----------------+--------------+------------+
| Travel History | Travel Start | Travel End |
+----------------+--------------+------------+
 
 
 
+-------------------------------------+
| No recent travel history available. |
 
+-------------------------------------+
 documented as of this encounter
 
 Plan of Treatment
 Not on filedocumented as of this encounter
 
 Procedures
 
 
+--------------------+--------+------------+----------------------+----------------------+
| Procedure Name     | Priori | Date/Time  | Associated Diagnosis | Comments             |
|                    | ty     |            |                      |                      |
+--------------------+--------+------------+----------------------+----------------------+
| SURGICAL PATHOLOGY | Routin | 11/10/1995 |                      |   Results for this   |
|                    | e      |            |                      | procedure are in the |
|                    |        |            |                      |  results section.    |
+--------------------+--------+------------+----------------------+----------------------+
 documented in this encounter
 
 Results
 SURGICAL PATHOLOGY (11/10/1995)
 
+-----------+--------------------------+-----------+-------------+--------------+
| Component | Value                    | Ref Range | Performed   | Pathologist  |
|           |                          |           | At          | Signature    |
+-----------+--------------------------+-----------+-------------+--------------+
| SURGICAL  | SOURCE OF SPECIMEN: [facility]  |           | [facility]        |              |
| PATHOLOGY | RESULTS                  |           | DEPARTMENT  |              |
|           | Preliminary              |           | OF          |              |
|           | History:CLINICAL         |           | PATHOLOGY   |              |
|           | HISTORYThe patient is a  |           |             |              |
|           | 49 year old diabetic     |           |             |              |
|           | woman who presents with  |           |             |              |
|           | nephroticsyndrome. Her   |           |             |              |
|           | active problems, besides |           |             |              |
|           |  proteinuria, include    |           |             |              |
|           | insulin-dependent        |           |             |              |
|           | diabetes, hypothyroidism |           |             |              |
|           |  and obesity. She had a  |           |             |              |
|           | kidney biopsyin October, |           |             |              |
|           |  , for evaluation of |           |             |              |
|           |  nephrotic syndrome,     |           |             |              |
|           | read as minimalchange    |           |             |              |
|           | disease. She has been    |           |             |              |
|           | treated with steroids    |           |             |              |
|           | and cyclophosphamideat   |           |             |              |
|           | various times with       |           |             |              |
|           | questionable response.   |           |             |              |
|           | Protein excretion        |           |             |              |
|           | hasincreased             |           |             |              |
|           | significantly over the   |           |             |              |
|           | last few years,          |           |             |              |
|           | currently in the rangeof |           |             |              |
|           |  15-20 gms. per day. Her |           |             |              |
|           |  creatinine clearance is |           |             |              |
|           |  55 ml. and her          |           |             |              |
|           | serumcreatinine is 1.4.  |           |             |              |
|           |       GROSS              |           |             |              |
|           | DESCRIPTIONThe specimen  |           |             |              |
|           | is received from .     |           |             |              |
 
|           | [facility]   |           |             |              |
|           | [facility]   |           |             |              |
|           | and [facility],      |           |             |              |
|           | [place], [place],        |           |             |              |
|           | labeled left             |           |             |              |
|           | kidneybiopsy. Present    |           |             |              |
|           | are three tan cores of   |           |             |              |
|           | tissue ranging from      |           |             |              |
|           | 0.3-1.5 cm.in length and |           |             |              |
|           |  0.1 cm. in uniform      |           |             |              |
|           | diameter. They are       |           |             |              |
|           | submitted in totofor     |           |             |              |
|           | light and                |           |             |              |
|           | immunofluorescence       |           |             |              |
|           | microscopy. The tissue   |           |             |              |
|           | submitted forelectron    |           |             |              |
|           | microscopy to the [facility]   |           |             |              |
|           | [facility]       |           |             |              |
|           | Electron                 |           |             |              |
|           | Microscopyfacility is    |           |             |              |
|           | considered to show       |           |             |              |
|           | sclerosis too advanced   |           |             |              |
|           | for                      |           |             |              |
|           | usefulevaluation.Case    |           |             |              |
|           | dictated by:   Karson MONTIEL |           |             |              |
|           |  JOVON Hale/tayo       |           |             |              |
|           |  MICROSCOPIC             |           |             |              |
|           | DESCRIPTIONThis tissue   |           |             |              |
|           | consists of multiple     |           |             |              |
|           | segments of a needle     |           |             |              |
|           | core of renalcortex      |           |             |              |
|           | containing about ten     |           |             |              |
|           | glomeruli per level.     |           |             |              |
|           | Among these, all butone  |           |             |              |
|           | or two show varying      |           |             |              |
|           | degrees of sclerosis.    |           |             |              |
|           | The least affected       |           |             |              |
|           | tuftdemonstrates minor   |           |             |              |
|           | axial sclerosis and the  |           |             |              |
|           | most affected is         |           |             |              |
|           | completelyobsolete. Most |           |             |              |
|           |  glomeruli are enlarged  |           |             |              |
|           | and contain axial, or in |           |             |              |
|           |  somecases, segmental,   |           |             |              |
|           | zones of sclerosis with  |           |             |              |
|           | broad tuft adhesions.    |           |             |              |
|           | Manycontain large        |           |             |              |
|           | hyaline deposits.        |           |             |              |
|           | Peripheral basement      |           |             |              |
|           | membranes arethickened.  |           |             |              |
|           | In the most sclerotic    |           |             |              |
|           | kareem there appear to be |           |             |              |
|           |  more than onelayer of   |           |             |              |
|           | basement membrane in     |           |             |              |
|           | some areas, particularly |           |             |              |
|           |  where themesangial      |           |             |              |
|           | sclerosis is also        |           |             |              |
|           | extensive. Tuft          |           |             |              |
|           | cellularity is normal in |           |             |              |
|           |  allinstances. The       |           |             |              |
 
|           | interstitium is fibrotic |           |             |              |
|           |  in most areas and there |           |             |              |
|           |  arebroad zones of       |           |             |              |
|           | partial or complete      |           |             |              |
|           | tubular collapse and     |           |             |              |
|           | atrophy. Smallnumbers of |           |             |              |
|           |  lymphocytes are admixed |           |             |              |
|           |  in these areas. Only    |           |             |              |
|           | rare zones ofnormal or,  |           |             |              |
|           | perhaps, hypertrophic    |           |             |              |
|           | tubules remain.          |           |             |              |
|           |       The biopsy         |           |             |              |
|           | contains a variety of    |           |             |              |
|           | arteries, the largest of |           |             |              |
|           |  which showsmoderate to  |           |             |              |
|           | severe fibroelastic type |           |             |              |
|           |  intimal sclerosis.      |           |             |              |
|           | Smaller arteriescontain  |           |             |              |
|           | hyaline deposits.        |           |             |              |
|           | The slides from the      |           |             |              |
|           | first kidney biopsy      |           |             |              |
|           | ([id number]) along with   |           |             |              |
|           | selectedelectron         |           |             |              |
|           | micrographs are reviewed |           |             |              |
|           |  and confirm that the    |           |             |              |
|           | principle findingat that |           |             |              |
|           |  time was extensive      |           |             |              |
|           | epithelial foot process  |           |             |              |
|           | collapse.                |           |             |              |
|           | IMMUNOFLUORESCENCEFrozen |           |             |              |
|           |  sections are stained    |           |             |              |
|           | with fluoresceinated     |           |             |              |
|           | antisera.                |           |             |              |
|           |   Positivecontrols are   |           |             |              |
|           | run simultaneously.      |           |             |              |
|           |   Each level contains    |           |             |              |
|           | three glomeruli.         |           |             |              |
|           | RESULTSIg+  |           |             |              |
|           | mesangial deposits at    |           |             |              |
|           | the vascular polesIgM:   |           |             |              |
|           |          2-3+ focal,     |           |             |              |
|           | segmental depositsIgA:   |           |             |              |
|           |          NegativeKAPPA:  |           |             |              |
|           |       Similar to         |           |             |              |
|           | IgMLAMBDA:      Similar  |           |             |              |
|           | to IgMC'3:               |           |             |              |
|           | 3-4+ lobular deposits    |           |             |              |
|           | with marked segmental    |           |             |              |
|           | variation; small         |           |             |              |
|           |             vessel       |           |             |              |
|           | deposits are also        |           |             |              |
|           | mwmhndkN2i:          2+  |           |             |              |
|           | focal, segmental         |           |             |              |
|           | depositsALBUMIN:         |           |             |              |
|           | Negative       FINAL     |           |             |              |
|           | DIAGNOSISKIDNEY BIOPSY:  |           |             |              |
|           |   ADVANCED FOCAL,        |           |             |              |
|           | SEGMENTAL                |           |             |              |
|           | GLOMERULOSCLEROSIS       |           |             |              |
|           |   EXTENSIVE INTERSTITIAL |           |             |              |
 
|           |  FIBROSIS AND TUBULAR    |           |             |              |
|           | ATROPHY                  |           |             |              |
|           |   ARTERIOSCLEROSIS AND   |           |             |              |
|           | HYALINE ARTERIOLAR       |           |             |              |
|           | SCLEROSIS, MODERATE TO   |           |             |              |
|           | SEVERE       Case        |           |             |              |
|           | reviewed by:   Karson MONTIEL |           |             |              |
|           |  Geoffrey,               |           |             |              |
|           | M.D.T:95:dj        |           |             |              |
|           | My electronic signature  |           |             |              |
|           | indicates that I have    |           |             |              |
|           | personally reviewed      |           |             |              |
|           | alldiagnostic slides,    |           |             |              |
|           | the gross and/or         |           |             |              |
|           | microscopic portion of   |           |             |              |
|           | thisreport and           |           |             |              |
|           | formulated the final     |           |             |              |
|           | diagnosis.               |           |             |              |
+-----------+--------------------------+-----------+-------------+--------------+
 
 
 
+----------+
| Specimen |
+----------+
| Other    |
+----------+
 
 
 
+----------------------+------------------------+--------------------+--------------+
| Performing           | Address                | City/State/Zipcode | Phone Number |
| Organization         |                        |                    |              |
+----------------------+------------------------+--------------------+--------------+
|   [facility]   [address]  | [place] [address] |              |
|  PATHOLOGY           | [address]                |                    |              |
+----------------------+------------------------+--------------------+--------------+
 documented in this encounter
 
 Visit Diagnoses
 Not on filedocumented in this encounter

## 2020-01-08 NOTE — XMS
Encounter Summary
  Created on: 2020
 
 Viviana Ricci
 External Reference #: [id number]
 : 47
 Sex: Female
 
 Demographics
 
 
+-----------------------+----------------------+
| Address               | [address]      |
|                       | [place]  [address] |
+-----------------------+----------------------+
| Home Phone            | [phone]      |
+-----------------------+----------------------+
| Preferred Language    | Unknown              |
+-----------------------+----------------------+
| Marital Status        | Single               |
+-----------------------+----------------------+
| Jainism Affiliation | [id number]                 |
+-----------------------+----------------------+
| Race                  | Unknown              |
+-----------------------+----------------------+
| Ethnic Group          | Unknown              |
+-----------------------+----------------------+
 
 
 Author
 
 
+--------------+--------------------------------------------+
| Author       | [facility]  |
|              | and [place]                                |
+--------------+--------------------------------------------+
| Organization | [facility]  |
|              | and [place]                                |
+--------------+--------------------------------------------+
| Address      | Unknown                                    |
+--------------+--------------------------------------------+
| Phone        | Unavailable                                |
+--------------+--------------------------------------------+
 
 
 
 Support
 
 
+----------------+--------------+---------------------+-----------------+
| Name           | Relationship | Address             | Phone           |
+----------------+--------------+---------------------+-----------------+
| Ada/Ed Radhames | ECON         | [address]              | [phone] |
|                |              | [place]  |                 |
|                |              |  [id number]              |                 |
+----------------+--------------+---------------------+-----------------+
 
 
 
 
 Care Team Providers
 
 
+-----------------------+------+-------------+
| Care Team Member Name | Role | Phone       |
+-----------------------+------+-------------+
 PCP  | Unavailable |
+-----------------------+------+-------------+
 
 
 
 Encounter Details
 
 
+--------+----------+---------------------+----------------------+-------------+
| Date   | Type     | Department          | Care Team            | Description |
+--------+----------+---------------------+----------------------+-------------+
| / | Abstract |   [facility]  [place]         |   Marzena Moser  |             |
|    |          | NEPHROLOGY  [address]   | M, DO  [address]      |             |
|        |          | [address]   | [address]      |             |
|        |          | [place], [place]     | [place]      |             |
|        |          | [address]          | [address]  [phone]  |             |
|        |          | [phone]        |  [phone] (Fax)  |             |
+--------+----------+---------------------+----------------------+-------------+
 
 
 
 Social History
 
 
+--------------+-------+-----------+--------+------+
| Tobacco Use  | Types | Packs/Day | Years  | Date |
|              |       |           | Used   |      |
+--------------+-------+-----------+--------+------+
| Never Smoker |       |           |        |      |
+--------------+-------+-----------+--------+------+
 
 
 
+---------------------+---+---+---+
| Smokeless Tobacco:  |   |   |   |
| Never Used          |   |   |   |
+---------------------+---+---+---+
 
 
 
+---------------------------------------------------------------+
| Comments: some second hand smoke exposure, but fairly minimal |
+---------------------------------------------------------------+
 
 
 
+-------------+-------------+---------+----------+
| Alcohol Use | Drinks/Week | oz/Week | Comments |
+-------------+-------------+---------+----------+
| No          |             |         |          |
+-------------+-------------+---------+----------+
 
 
 
 
+------------------+---------------+
| Sex Assigned at  | Date Recorded |
| Birth            |               |
+------------------+---------------+
| Not on file      |               |
+------------------+---------------+
 
 
 
+----------------+-------------+-------------+
| Job Start Date | Occupation  | Industry    |
+----------------+-------------+-------------+
| Not on file    | Not on file | Not on file |
+----------------+-------------+-------------+
 
 
 
+----------------+--------------+------------+
| Travel History | Travel Start | Travel End |
+----------------+--------------+------------+
 
 
 
+-------------------------------------+
| No recent travel history available. |
+-------------------------------------+
 documented as of this encounter
 
 Plan of Treatment
 
 
+--------+-----------+------------+----------------------+-------------------+
| Date   | Type      | Specialty  | Care Team            | Description       |
+--------+-----------+------------+----------------------+-------------------+
| / | Office    | Cardiology |   Tonia Wilson,    |                   |
|    | Visit     |            | ARNJESSICA  [address]   |                   |
|        |           |            | [place]  |                   |
|        |           |            | [address]  [phone]  |                   |
|        |           |            |  [phone] (Fax)  |                   |
+--------+-----------+------------+----------------------+-------------------+
| / | Hospital  | Radiology  |   Popeye Townsend, |                   |
|    | Encounter |            |  MD  [address] |                   |
|        |           |            |  St.  [place],   |                   |
|        |           |            | [place] [address]             |                   |
|        |           |            | [phone]         |                   |
|        |           |            | [phone] (Fax)   |                   |
+--------+-----------+------------+----------------------+-------------------+
| / | Surgery   | Radiology  |   Popeye Townsend, | CV EP PPM SYSTEM  |
|    |           |            |  MD  [address] | IMPLANT           |
|        |           |            |  St.  [place],   |                   |
|        |           |            | [place] [address]             |                   |
|        |           |            | [phone]         |                   |
|        |           |            | [phone] (Fax)   |                   |
+--------+-----------+------------+----------------------+-------------------+
| 02/10/ | Clinical  | Cardiology |                      |                   |
|    | Support   |            |                      |                   |
+--------+-----------+------------+----------------------+-------------------+
| / | Office    | Cardiology |   Tonia Wilson,    |                   |
|    | Visit     |            | ARNP  [address]   |                   |
 
|        |           |            | St  [place], [place]  |                   |
|        |           |            | [address]  [phone]  |                   |
|        |           |            |  [phone] (Fax)  |                   |
+--------+-----------+------------+----------------------+-------------------+
| / | Off-Site  | Nephrology |   Marzena Moser  |                   |
|    | Visit     |            | DO ETIENNE  [address] [place] [address]      |                   |
|        |           |            | [address]      |                   |
|        |           |            | [place]      |                   |
|        |           |            | [address]  [phone]  |                   |
|        |           |            |  [phone] (Fax)  |                   |
+--------+-----------+------------+----------------------+-------------------+
 documented as of this encounter
 
 Procedures
 
 
+----------------+--------+------------+----------------------+----------------------+
| Procedure Name | Priori | Date/Time  | Associated Diagnosis | Comments             |
|                | ty     |            |                      |                      |
+----------------+--------+------------+----------------------+----------------------+
| CULTURE, URINE | Routin | 2015 |                      |   Results for this   |
|                | e      |            |                      | procedure are in the |
|                |        |            |                      |  results section.    |
+----------------+--------+------------+----------------------+----------------------+
 documented in this encounter
 
 Results
 Culture, Urine (2015)
 
+-----------+---------+-----------+------------+--------------+
| Component | Value   | Ref Range | Performed  | Pathologist  |
|           |         |           | At         | Signature    |
+-----------+---------+-----------+------------+--------------+
| Urine     | >[id number] |           |            |              |
| Culture,  |         |           |            |              |
| Routine   |         |           |            |              |
+-----------+---------+-----------+------------+--------------+
 
 
 
+-----------------+
| Specimen        |
+-----------------+
| Urine specimen  |
| (specimen)      |
+-----------------+
 
 
 
+-------------+----------------+--------+----------------+
| Organism    | Antibiotic     | Method | Susceptibility |
+-------------+----------------+--------+----------------+
| Raoultella  | Ciprofloxacin  |        |   Sensitive    |
| planticola  |                |        |                |
+-------------+----------------+--------+----------------+
| Raoultella  | Nitrofurantoin |        |   Sensitive    |
| planticola  |                |        |                |
+-------------+----------------+--------+----------------+
| Raoultella  | Gentamicin     |        |   Sensitive    |
| planticola  |                |        |                |
 
+-------------+----------------+--------+----------------+
| Raoultella  | Meropenem      |        |   Sensitive    |
| planticola  |                |        |                |
+-------------+----------------+--------+----------------+
| Raoultella  | Tetracycline   |        |   Sensitive    |
| planticola  |                |        |                |
+-------------+----------------+--------+----------------+
| Raoultella  | Aztreonam      |        |   Sensitive    |
| planticola  |                |        |                |
+-------------+----------------+--------+----------------+
| Raoultella  | Ampicillin     |        |   Resistant    |
| planticola  |                |        |                |
+-------------+----------------+--------+----------------+
 documented in this encounter
 
 Visit Diagnoses
 Not on filedocumented in this encounter

## 2020-01-08 NOTE — XMS
Encounter Summary
  Created on: 2020
 
 Viviana Ricci
 External Reference #: [id number]
 : 47
 Sex: Female
 
 Demographics
 
 
+-----------------------+----------------------+
| Address               | [address]      |
|                       | [place]  [address] |
+-----------------------+----------------------+
| Home Phone            | [phone]      |
+-----------------------+----------------------+
| Preferred Language    | Unknown              |
+-----------------------+----------------------+
| Marital Status        | Single               |
+-----------------------+----------------------+
| Catholic Affiliation | 1009                 |
+-----------------------+----------------------+
| Race                  | Unknown              |
+-----------------------+----------------------+
| Ethnic Group          | Unknown              |
+-----------------------+----------------------+
 
 
 Author
 
 
+--------------+--------------------------------------------+
| Author       | [facility]  |
|              | and [place]                                |
+--------------+--------------------------------------------+
| Organization | [facility]  |
|              | and [place]                                |
+--------------+--------------------------------------------+
| Address      | Unknown                                    |
+--------------+--------------------------------------------+
| Phone        | Unavailable                                |
+--------------+--------------------------------------------+
 
 
 
 Support
 
 
+----------------+--------------+---------------------+-----------------+
| Name           | Relationship | Address             | Phone           |
+----------------+--------------+---------------------+-----------------+
| Ada/Ed Radhames | ECON         | [place]              | [phone] |
|                |              | [place]  |                 |
|                |              |  [address]              |                 |
+----------------+--------------+---------------------+-----------------+
 
 
 
 
 Care Team Providers
 
 
+-----------------------+------+-------------+
| Care Team Member Name | Role | Phone       |
+-----------------------+------+-------------+
 PCP  | Unavailable |
+-----------------------+------+-------------+
 
 
 
 Encounter Details
 
 
+--------+-------------+---------------------+----------------------+----------------------+
| Date   | Type        | Department          | Care Team            | Description          |
+--------+-------------+---------------------+----------------------+----------------------+
| / | Orders Only |   [facility]         |   Marzena Moser  | Secondary            |
| 2015   |             | NEPHROLOGY  [address]   | M, DO  [address]      | hyperparathyroidism  |
|        |             | [address]   | [address]      | (Primary Dx); Kidney |
|        |             | [place], [place]     | [place], [place]      |  replaced by         |
|        |             | [address]          | [address]  [phone]  | transplant; Diabetes |
|        |             | [phone]        |  [phone] (Fax)  |  mellitus type II,   |
|        |             |                     |                      | uncontrolled (HCC);  |
|        |             |                     |                      | Essential            |
|        |             |                     |                      | hypertension         |
+--------+-------------+---------------------+----------------------+----------------------+
 
 
 
 Social History
 
 
+--------------+-------+-----------+--------+------+
| Tobacco Use  | Types | Packs/Day | Years  | Date |
|              |       |           | Used   |      |
+--------------+-------+-----------+--------+------+
| Never Smoker |       |           |        |      |
+--------------+-------+-----------+--------+------+
 
 
 
+---------------------+---+---+---+
| Smokeless Tobacco:  |   |   |   |
| Never Used          |   |   |   |
+---------------------+---+---+---+
 
 
 
+---------------------------------------------------------------+
| Comments: some second hand smoke exposure, but fairly minimal |
+---------------------------------------------------------------+
 
 
 
+-------------+-------------+---------+----------+
| Alcohol Use | Drinks/Week | oz/Week | Comments |
+-------------+-------------+---------+----------+
| No          |             |         |          |
 
+-------------+-------------+---------+----------+
 
 
 
+------------------+---------------+
| Sex Assigned at  | Date Recorded |
| Birth            |               |
+------------------+---------------+
| Not on file      |               |
+------------------+---------------+
 
 
 
+----------------+-------------+-------------+
| Job Start Date | Occupation  | Industry    |
+----------------+-------------+-------------+
| Not on file    | Not on file | Not on file |
+----------------+-------------+-------------+
 
 
 
+----------------+--------------+------------+
| Travel History | Travel Start | Travel End |
+----------------+--------------+------------+
 
 
 
+-------------------------------------+
| No recent travel history available. |
+-------------------------------------+
 documented as of this encounter
 
 [facility] Cherelle Zhou RN - 2015 11:28 AM PSTStanding transplant lab order for nephro
logy  faxed to [facility].Electronically signed by Cherelle Julian RN at 2015 11:
32 AM PSTdocumented in this encounter
 
 Plan of Treatment
 
 
+--------+-----------+------------+----------------------+-------------------+
| Date   | Type      | Specialty  | Care Team            | Description       |
+--------+-----------+------------+----------------------+-------------------+
| / | Office    | Cardiology |   Tonia Wilson,    |                   |
|    | Visit     |            | ARNJESSICA  [address]   |                   |
|        |           |            | St [place], [place]  |                   |
|        |           |            | [address]  [phone]  |                   |
|        |           |            |  [phone] (Fax)  |                   |
+--------+-----------+------------+----------------------+-------------------+
| / | Hospital  | Radiology  |   Popeye Townsend, |                   |
|    | Encounter |            |  MD  [address] [place] |                   |
|        |           |            |  [place],   |                   |
|        |           |            | [place] [address]             |                   |
|        |           |            | [phone]         |                   |
|        |           |            | [phone] (Fax)   |                   |
+--------+-----------+------------+----------------------+-------------------+
| / | Surgery   | Radiology  |   Popeye Townsend, | CV EP PPM SYSTEM  |
|    |           |            |  MD  [address] | IMPLANT           |
|        |           |            |  St.  [place],   |                   |
|        |           |            | [place] [address]             |                   |
 
|        |           |            | [phone]         |                   |
|        |           |            | [phone] (Fax)   |                   |
+--------+-----------+------------+----------------------+-------------------+
| 02/10/ | Clinical  | Cardiology |                      |                   |
|    | Support   |            |                      |                   |
+--------+-----------+------------+----------------------+-------------------+
| / | Office    | Cardiology |   RakeshTonia andre,    |                   |
|    | Visit     |            | BELKIS  [address]   |                   |
|        |           |            | [place]  |                   |
|        |           |            | [address]  [phone]  |                   |
|        |           |            |  [phone] (Fax)  |                   |
+--------+-----------+------------+----------------------+-------------------+
| / | Off-Site  | Nephrology |   Marzena Moser  |                   |
|    | Visit     |            | DO ETIENNE  [address] [place] [address]      |                   |
|        |           |            | [address]      |                   |
|        |           |            | [place]      |                   |
|        |           |            | [address]  [phone]  |                   |
|        |           |            |  [phone] (Fax)  |                   |
+--------+-----------+------------+----------------------+-------------------+
 documented as of this encounter
 
 Visit Diagnoses
 
 
+----------------------------------------------------------------------------------------+
| Diagnosis                                                                              |
+----------------------------------------------------------------------------------------+
|   Secondary hyperparathyroidism - Primary  Secondary hyperparathyroidism (of renal     |
| origin)                                                                                |
+----------------------------------------------------------------------------------------+
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
+----------------------------------------------------------------------------------------+
|   Diabetes mellitus type II, uncontrolled (HCC)  Type II or unspecified type diabetes  |
| mellitus without mention of complication, uncontrolled                                 |
+----------------------------------------------------------------------------------------+
|   Essential hypertension  Unspecified essential hypertension                           |
+----------------------------------------------------------------------------------------+
 documented in this encounter

## 2020-01-08 NOTE — XMS
Encounter Summary
  Created on: 2020
 
 Viviana Ricci
 External Reference #: [id number]
 : 47
 Sex: Female
 
 Demographics
 
 
+-----------------------+----------------------+
| Address               | [address]      |
|                       | [place]  [address] |
+-----------------------+----------------------+
| Home Phone            | [phone]      |
+-----------------------+----------------------+
| Preferred Language    | Unknown              |
+-----------------------+----------------------+
| Marital Status        | Single               |
+-----------------------+----------------------+
| Faith Affiliation | [id number]                 |
+-----------------------+----------------------+
| Race                  | Unknown              |
+-----------------------+----------------------+
| Ethnic Group          | Unknown              |
+-----------------------+----------------------+
 
 
 Author
 
 
+--------------+--------------------------------------------+
| Author       | [facility]  |
|              | and [facility]                                |
+--------------+--------------------------------------------+
| Organization | [facility]  |
|              | and [facility]                                |
+--------------+--------------------------------------------+
| Address      | Unknown                                    |
+--------------+--------------------------------------------+
| Phone        | Unavailable                                |
+--------------+--------------------------------------------+
 
 
 
 Support
 
 
+----------------+--------------+---------------------+-----------------+
| Name           | Relationship | Address             | Phone           |
+----------------+--------------+---------------------+-----------------+
| Ada/Ed Radhames | ECON         | [address]              | [phone] |
|                |              | [place]  |                 |
|                |              |  [id number]              |                 |
+----------------+--------------+---------------------+-----------------+
 
 
 
 
 Care Team Providers
 
 
+------------------------+------+-----------------+
| Care Team Member Name  | Role | Phone           |
+------------------------+------+-----------------+
| Marzena Moser DO | PCP  | [phone] |
+------------------------+------+-----------------+
 
 
 
 Reason for Visit
 
 
+-------------------+----------+
| Reason            | Comments |
+-------------------+----------+
| Medication Refill |          |
+-------------------+----------+
 
 
 
 Encounter Details
 
 
+--------+--------+---------------------+----------------------+-------------------+
| Date   | Type   | Department          | Care Team            | Description       |
+--------+--------+---------------------+----------------------+-------------------+
| / | Refill |   [facility]         |   Marzena Moser  | Medication Refill |
| 2019   |        | NEPHROLOGY  [address]   | M, DO  [address]      |                   |
|        |        | [address]   | [address]      |                   |
|        |        | [place], [place]     | [place], [place]      |                   |
|        |        | [address]          | [address]  [phone]  |                   |
|        |        | [phone]        |  [phone] (Fax)  |                   |
+--------+--------+---------------------+----------------------+-------------------+
 
 
 
 Social History
 
 
+--------------+-------+-----------+--------+------+
| Tobacco Use  | Types | Packs/Day | Years  | Date |
|              |       |           | Used   |      |
+--------------+-------+-----------+--------+------+
| Never Smoker |       |           |        |      |
+--------------+-------+-----------+--------+------+
 
 
 
+---------------------+---+---+---+
| Smokeless Tobacco:  |   |   |   |
| Never Used          |   |   |   |
+---------------------+---+---+---+
 
 
 
+---------------------------------------------------------------+
| Comments: some second hand smoke exposure, but fairly minimal |
 
+---------------------------------------------------------------+
 
 
 
+-------------+-------------+---------+----------+
| Alcohol Use | Drinks/Week | oz/Week | Comments |
+-------------+-------------+---------+----------+
| No          |             |         |          |
+-------------+-------------+---------+----------+
 
 
 
+------------------+---------------+
| Sex Assigned at  | Date Recorded |
| Birth            |               |
+------------------+---------------+
| Not on file      |               |
+------------------+---------------+
 
 
 
+----------------+-------------+-------------+
| Job Start Date | Occupation  | Industry    |
+----------------+-------------+-------------+
| Not on file    | Not on file | Not on file |
+----------------+-------------+-------------+
 
 
 
+----------------+--------------+------------+
| Travel History | Travel Start | Travel End |
+----------------+--------------+------------+
 
 
 
+-------------------------------------+
| No recent travel history available. |
+-------------------------------------+
 documented as of this encounter
 
 Plan of Treatment
 
 
+--------+-----------+------------+----------------------+-------------------+
| Date   | Type      | Specialty  | Care Team            | Description       |
+--------+-----------+------------+----------------------+-------------------+
| / | Office    | Cardiology |   Tonia Wilson,    |                   |
|    | Visit     |            | ARNP  [address]   |                   |
|        |           |            | [place], [place]  |                   |
|        |           |            | [address]  [phone]  |                   |
|        |           |            |  [phone] (Fax)  |                   |
+--------+-----------+------------+----------------------+-------------------+
| / | Hospital  | Radiology  |   Popeye Townsend, |                   |
|    | Encounter |            |  MD  [address] |                   |
|        |           |            |  [place],   |                   |
|        |           |            | [place] [address]             |                   |
|        |           |            | [phone]         |                   |
|        |           |            | [phone] (Fax)   |                   |
+--------+-----------+------------+----------------------+-------------------+
| / | Surgery   | Radiology  |   Popeye Townsend, | CV EP PPM SYSTEM  |
 
|    |           |            |  MD  [address] | IMPLANT           |
|        |           |            |  [place],   |                   |
|        |           |            | [place] [address]             |                   |
|        |           |            | [phone]         |                   |
|        |           |            | [phone] (Fax)   |                   |
+--------+-----------+------------+----------------------+-------------------+
| 02/10/ | Clinical  | Cardiology |                      |                   |
|    | Support   |            |                      |                   |
+--------+-----------+------------+----------------------+-------------------+
| / | Office    | Cardiology |   RakeshTonia andre,    |                   |
|    | Visit     |            | ARNP  [address]   |                   |
|        |           |            | [place]  |                   |
|        |           |            | [address] [phone]  |                   |
|        |           |            |  [phone] (Fax)  |                   |
+--------+-----------+------------+----------------------+-------------------+
| / | Off-Site  | Nephrology |   Marzena Moser  |                   |
|    | Visit     |            | DO ETIENNE  [address] [place] [address]      |                   |
|        |           |            | [address]      |                   |
|        |           |            | [place]      |                   |
|        |           |            | [address] [phone]  |                   |
|        |           |            |  [phone] (Fax)  |                   |
+--------+-----------+------------+----------------------+-------------------+
 documented as of this encounter
 
 Visit Diagnoses
 Not on filedocumented in this encounter

## 2020-01-08 NOTE — XMS
Encounter Summary
  Created on: 2020
 
 Viviana Ricci
 External Reference #: [id number]
 : 47
 Sex: Female
 
 Demographics
 
 
+-----------------------+----------------------+
| Address               | [address]      |
|                       | [place]  [address] |
+-----------------------+----------------------+
| Home Phone            | [phone]      |
+-----------------------+----------------------+
| Preferred Language    | Unknown              |
+-----------------------+----------------------+
| Marital Status        | Single               |
+-----------------------+----------------------+
| Latter day Affiliation | [id number]                 |
+-----------------------+----------------------+
| Race                  | Unknown              |
+-----------------------+----------------------+
| Ethnic Group          | Unknown              |
+-----------------------+----------------------+
 
 
 Author
 
 
+--------------+--------------------------------------------+
| Author       | [facility]  |
|              | and [facility]                                |
+--------------+--------------------------------------------+
| Organization | [facility]  |
|              | and [facility]                                |
+--------------+--------------------------------------------+
| Address      | Unknown                                    |
+--------------+--------------------------------------------+
| Phone        | Unavailable                                |
+--------------+--------------------------------------------+
 
 
 
 Support
 
 
+----------------+--------------+---------------------+-----------------+
| Name           | Relationship | Address             | Phone           |
+----------------+--------------+---------------------+-----------------+
| Ada/Ed Radhames | ECON         | [address]              | [phone] |
|                |              | [place]  |                 |
|                |              |  [address]              |                 |
+----------------+--------------+---------------------+-----------------+
 
 
 
 
 Care Team Providers
 
 
+-----------------------+------+-------------+
| Care Team Member Name | Role | Phone       |
+-----------------------+------+-------------+
 PCP  | Unavailable |
+-----------------------+------+-------------+
 
 
 
 Encounter Details
 
 
+--------+----------+---------------------+----------------------+-------------+
| Date   | Type     | Department          | Care Team            | Description |
+--------+----------+---------------------+----------------------+-------------+
| / | Abstract |   [facility] [place]         |   Marzena Moser  |             |
| 2017   |          | NEPHROLOGY  [address]   | M, DO  [address]      |             |
|        |          | [address]   | [address]      |             |
|        |          | [place], [place]     | [place]      |             |
|        |          | [address]          | [address]  [phone]  |             |
|        |          | [phone]        |  [phone] (Fax)  |             |
+--------+----------+---------------------+----------------------+-------------+
 
 
 
 Social History
 
 
+--------------+-------+-----------+--------+------+
| Tobacco Use  | Types | Packs/Day | Years  | Date |
|              |       |           | Used   |      |
+--------------+-------+-----------+--------+------+
| Never Smoker |       |           |        |      |
+--------------+-------+-----------+--------+------+
 
 
 
+---------------------+---+---+---+
| Smokeless Tobacco:  |   |   |   |
| Never Used          |   |   |   |
+---------------------+---+---+---+
 
 
 
+---------------------------------------------------------------+
| Comments: some second hand smoke exposure, but fairly minimal |
+---------------------------------------------------------------+
 
 
 
+-------------+-------------+---------+----------+
| Alcohol Use | Drinks/Week | oz/Week | Comments |
+-------------+-------------+---------+----------+
| No          |             |         |          |
+-------------+-------------+---------+----------+
 
 
 
 
+------------------+---------------+
| Sex Assigned at  | Date Recorded |
| Birth            |               |
+------------------+---------------+
| Not on file      |               |
+------------------+---------------+
 
 
 
+----------------+-------------+-------------+
| Job Start Date | Occupation  | Industry    |
+----------------+-------------+-------------+
| Not on file    | Not on file | Not on file |
+----------------+-------------+-------------+
 
 
 
+----------------+--------------+------------+
| Travel History | Travel Start | Travel End |
+----------------+--------------+------------+
 
 
 
+-------------------------------------+
| No recent travel history available. |
+-------------------------------------+
 documented as of this encounter
 
 Plan of Treatment
 
 
+--------+-----------+------------+----------------------+-------------------+
| Date   | Type      | Specialty  | Care Team            | Description       |
+--------+-----------+------------+----------------------+-------------------+
| / | Office    | Cardiology |   Tonia Wilson,    |                   |
|    | Visit     |            | ARNJESSICA  [address]   |                   |
|        |           |            | [place]  |                   |
|        |           |            | [address]  [phone]  |                   |
|        |           |            |  [phone] (Fax)  |                   |
+--------+-----------+------------+----------------------+-------------------+
| / | Hospital  | Radiology  |   Popeye Townsend, |                   |
|    | Encounter |            |  MD  [address] |                   |
|        |           |            |  St.  [place],   |                   |
|        |           |            | [place] [address]             |                   |
|        |           |            | [phone]         |                   |
|        |           |            | [phone] (Fax)   |                   |
+--------+-----------+------------+----------------------+-------------------+
| / | Surgery   | Radiology  |   Popeye Townsend, | CV EP PPM SYSTEM  |
|    |           |            |  MD  [address] | IMPLANT           |
|        |           |            |  St.  [place],   |                   |
|        |           |            | [place] [address]             |                   |
|        |           |            | [phone]         |                   |
|        |           |            | [phone] (Fax)   |                   |
+--------+-----------+------------+----------------------+-------------------+
| 02/10/ | Clinical  | Cardiology |                      |                   |
|    | Support   |            |                      |                   |
+--------+-----------+------------+----------------------+-------------------+
| / | Office    | Cardiology |   Tonia Wilson,    |                   |
|    | Visit     |            | ARNP  [address]   |                   |
 
|        |           |            | St  [place], [place]  |                   |
|        |           |            | [address]  [phone]  |                   |
|        |           |            |  [phone] (Fax)  |                   |
+--------+-----------+------------+----------------------+-------------------+
| / | Off-Site  | Nephrology |   Marzena Moser  |                   |
|    | Visit     |            | DO ETIENNE  [address] [place] [address]      |                   |
|        |           |            | [address]      |                   |
|        |           |            | [place]      |                   |
|        |           |            | [address]  [phone]  |                   |
|        |           |            |  [phone] (Fax)  |                   |
+--------+-----------+------------+----------------------+-------------------+
 documented as of this encounter
 
 Procedures
 
 
+----------------------+--------+------------+----------------------+----------------------+
| Procedure Name       | Priori | Date/Time  | Associated Diagnosis | Comments             |
|                      | ty     |            |                      |                      |
+----------------------+--------+------------+----------------------+----------------------+
| EXTERNAL LAB: BUN    | Routin | 2017 |                      |   Results for this   |
|                      | e      |            |                      | procedure are in the |
|                      |        |            |                      |  results section.    |
+----------------------+--------+------------+----------------------+----------------------+
| EXTERNAL LAB:        | Routin | 2017 |                      |   Results for this   |
| GLUCOSE              | e      |            |                      | procedure are in the |
|                      |        |            |                      |  results section.    |
+----------------------+--------+------------+----------------------+----------------------+
| EXTERNAL LAB:        | Routin | 2017 |                      |   Results for this   |
| TACROLIMUS LEVEL,    | e      |            |                      | procedure are in the |
| CMIA                 |        |            |                      |  results section.    |
+----------------------+--------+------------+----------------------+----------------------+
| EXTERNAL LAB: ALT    | Routin | 2017 |                      |   Results for this   |
|                      | e      |            |                      | procedure are in the |
|                      |        |            |                      |  results section.    |
+----------------------+--------+------------+----------------------+----------------------+
| EXTERNAL LAB: AST    | Routin | 2017 |                      |   Results for this   |
|                      | e      |            |                      | procedure are in the |
|                      |        |            |                      |  results section.    |
+----------------------+--------+------------+----------------------+----------------------+
| EXTERNAL LAB:        | Routin | 2017 |                      |   Results for this   |
| ALKALINE PHOSPHATASE | e      |            |                      | procedure are in the |
|                      |        |            |                      |  results section.    |
+----------------------+--------+------------+----------------------+----------------------+
| EXTERNAL LAB:        | Routin | 2017 |                      |   Results for this   |
| BILIRUBIN, TOTAL     | e      |            |                      | procedure are in the |
|                      |        |            |                      |  results section.    |
+----------------------+--------+------------+----------------------+----------------------+
| EXTERNAL LAB:        | Routin | 2017 |                      |   Results for this   |
| ALBUMIN              | e      |            |                      | procedure are in the |
|                      |        |            |                      |  results section.    |
+----------------------+--------+------------+----------------------+----------------------+
| EXTERNAL LAB:        | Routin | 2017 |                      |   Results for this   |
| PROTEIN, TOTAL       | e      |            |                      | procedure are in the |
|                      |        |            |                      |  results section.    |
+----------------------+--------+------------+----------------------+----------------------+
| EXTERNAL LAB:        | Routin | 2017 |                      |   Results for this   |
| PHOSPHORUS           | e      |            |                      | procedure are in the |
|                      |        |            |                      |  results section.    |
+----------------------+--------+------------+----------------------+----------------------+
 
| EXTERNAL LAB:        | Routin | 2017 |                      |   Results for this   |
| MAGNESIUM            | e      |            |                      | procedure are in the |
|                      |        |            |                      |  results section.    |
+----------------------+--------+------------+----------------------+----------------------+
| EXTERNAL LAB:        | Routin | 2017 |                      |   Results for this   |
| CALCIUM              | e      |            |                      | procedure are in the |
|                      |        |            |                      |  results section.    |
+----------------------+--------+------------+----------------------+----------------------+
| EXTERNAL LAB: CARBON | Routin | 2017 |                      |   Results for this   |
|  DIOXIDE             | e      |            |                      | procedure are in the |
|                      |        |            |                      |  results section.    |
+----------------------+--------+------------+----------------------+----------------------+
| EXTERNAL LAB:        | Routin | 2017 |                      |   Results for this   |
| CHLORIDE             | e      |            |                      | procedure are in the |
|                      |        |            |                      |  results section.    |
+----------------------+--------+------------+----------------------+----------------------+
| EXTERNAL LAB:        | Routin | 2017 |                      |   Results for this   |
| POTASSIUM            | e      |            |                      | procedure are in the |
|                      |        |            |                      |  results section.    |
+----------------------+--------+------------+----------------------+----------------------+
| EXTERNAL LAB: SODIUM | Routin | 2017 |                      |   Results for this   |
|                      | e      |            |                      | procedure are in the |
|                      |        |            |                      |  results section.    |
+----------------------+--------+------------+----------------------+----------------------+
| EXTERNAL LAB: CBC    | Routin | 2017 |                      |   Results for this   |
|                      | e      |            |                      | procedure are in the |
|                      |        |            |                      |  results section.    |
+----------------------+--------+------------+----------------------+----------------------+
| EXTERNAL LAB:        | Routin | 2017 |                      |   Results for this   |
| TRIGLYCERIDES        | e      |            |                      | procedure are in the |
|                      |        |            |                      |  results section.    |
+----------------------+--------+------------+----------------------+----------------------+
| EXTERNAL LAB:        | Routin | 2017 |                      |   Results for this   |
| CHOLESTEROL, HDL     | e      |            |                      | procedure are in the |
|                      |        |            |                      |  results section.    |
+----------------------+--------+------------+----------------------+----------------------+
| EXTERNAL LAB:        | Routin | 2017 |                      |   Results for this   |
| CHOLESTEROL, TOTAL   | e      |            |                      | procedure are in the |
|                      |        |            |                      |  results section.    |
+----------------------+--------+------------+----------------------+----------------------+
| EXTERNAL LAB:        | Routin | 2017 |                      |   Results for this   |
| CHOLESTEROL, LDL     | e      |            |                      | procedure are in the |
| DIRECT               |        |            |                      |  results section.    |
+----------------------+--------+------------+----------------------+----------------------+
| EXTERNAL LAB:        | Routin | 2017 |                      |   Results for this   |
| CHOLESTEROL, LDL     | e      |            |                      | procedure are in the |
|                      |        |            |                      |  results section.    |
+----------------------+--------+------------+----------------------+----------------------+
| EXTERNAL LAB: EGFR   | Routin | 2017 |                      |   Results for this   |
|                      | e      |            |                      | procedure are in the |
|                      |        |            |                      |  results section.    |
+----------------------+--------+------------+----------------------+----------------------+
| EXTERNAL LAB:        | Routin | 2017 |                      |   Results for this   |
| CREATININE           | e      |            |                      | procedure are in the |
|                      |        |            |                      |  results section.    |
+----------------------+--------+------------+----------------------+----------------------+
| HEMOGLOBIN A1C       | Routin | 2017 |                      |   Results for this   |
|                      | e      |            |                      | procedure are in the |
|                      |        |            |                      |  results section.    |
+----------------------+--------+------------+----------------------+----------------------+
 
 documented in this encounter
 
 Results
 External Lab: Tacrolimus Level, CMIA (2017)
 
+-------------+-------+-----------+------------+--------------+
| Component   | Value | Ref Range | Performed  | Pathologist  |
|             |       |           | At         | Signature    |
+-------------+-------+-----------+------------+--------------+
| Tacrolimus, | 6.3   |           |            |              |
|  CMIA,      |       |           |            |              |
| External    |       |           |            |              |
+-------------+-------+-----------+------------+--------------+
 
 
 
+----------+
| Specimen |
+----------+
|          |
+----------+
 Hemoglobin A1C (2017)
 
+-------------+-------+-----------+------------+--------------+
| Component   | Value | Ref Range | Performed  | Pathologist  |
|             |       |           | At         | Signature    |
+-------------+-------+-----------+------------+--------------+
| Hemoglobin  | 7.6   |           | EXTERNAL   |              |
| A1c,        |       |           | LAB        |              |
| external    |       |           |            |              |
+-------------+-------+-----------+------------+--------------+
 
 
 
+-----------------+
| Specimen        |
+-----------------+
| Blood specimen  |
| (specimen)      |
+-----------------+
 
 
 
+--------------------------+
| Resulting Agency Comment |
+--------------------------+
|   [facility]              |
+--------------------------+
 
 
 
+----------------+---------+--------------------+--------------+
| Performing     | Address | City/State/Zipcode | Phone Number |
| Organization   |         |                    |              |
+----------------+---------+--------------------+--------------+
|   EXTERNAL LAB |         |                    |              |
+----------------+---------+--------------------+--------------+
 External Lab: Alkaline Phosphatase (2017)
 
+-----------+-------+-----------+------------+--------------+
 
| Component | Value | Ref Range | Performed  | Pathologist  |
|           |       |           | At         | Signature    |
+-----------+-------+-----------+------------+--------------+
| ALP,      | 108   | 31 - 130  | EXTERNAL   |              |
| External  |       |           | LAB        |              |
+-----------+-------+-----------+------------+--------------+
 
 
 
+--------------------------+
| Resulting Agency Comment |
+--------------------------+
|   [facility]              |
+--------------------------+
 
 
 
+----------------+---------+--------------------+--------------+
| Performing     | Address | City/State/Zipcode | Phone Number |
| Organization   |         |                    |              |
+----------------+---------+--------------------+--------------+
|   EXTERNAL LAB |         |                    |              |
+----------------+---------+--------------------+--------------+
 External Lab: Bilirubin, Total (2017)
 
+-------------+-------+-----------+------------+--------------+
| Component   | Value | Ref Range | Performed  | Pathologist  |
|             |       |           | At         | Signature    |
+-------------+-------+-----------+------------+--------------+
| Bilirubin,  | 0.6   | 0 - 1.2   | EXTERNAL   |              |
| Total,      |       |           | LAB        |              |
| External    |       |           |            |              |
+-------------+-------+-----------+------------+--------------+
 
 
 
+--------------------------+
| Resulting Agency Comment |
+--------------------------+
|   [facility]              |
+--------------------------+
 
 
 
+----------------+---------+--------------------+--------------+
| Performing     | Address | City/State/Zipcode | Phone Number |
| Organization   |         |                    |              |
+----------------+---------+--------------------+--------------+
|   EXTERNAL LAB |         |                    |              |
+----------------+---------+--------------------+--------------+
 External Lab: CBC (2017)
 
+-----------+---------+-----------+------------+--------------+
| Component | Value   | Ref Range | Performed  | Pathologist  |
|           |         |           | At         | Signature    |
+-----------+---------+-----------+------------+--------------+
| WBC,      | 5.5     | 4.5 - 11  | EXTERNAL   |              |
| External  |         |           | LAB        |              |
+-----------+---------+-----------+------------+--------------+
| HGB,      | 13.8    | 12 - 16   | EXTERNAL   |              |
 
| External  |         |           | LAB        |              |
+-----------+---------+-----------+------------+--------------+
| HCT,      | 43.3    | 35 - 45   | EXTERNAL   |              |
| External  |         |           | LAB        |              |
+-----------+---------+-----------+------------+--------------+
| PLT,      | 169     | 140 - 440 | EXTERNAL   |              |
| External  |         |           | LAB        |              |
+-----------+---------+-----------+------------+--------------+
| RBC,      | 4.17    | 3.8 - 5.1 | EXTERNAL   |              |
| External  |         |           | LAB        |              |
+-----------+---------+-----------+------------+--------------+
| MCV,      | 104 (A) | 81 - 99   | EXTERNAL   |              |
| External  |         |           | LAB        |              |
+-----------+---------+-----------+------------+--------------+
| RDW,      | 14.1    | 10.5 - 15 | EXTERNAL   |              |
| External  |         |           | LAB        |              |
+-----------+---------+-----------+------------+--------------+
 
 
 
+--------------------------+
| Resulting Agency Comment |
+--------------------------+
|   [facility]              |
+--------------------------+
 
 
 
+----------------+---------+--------------------+--------------+
| Performing     | Address | City/State/Zipcode | Phone Number |
| Organization   |         |                    |              |
+----------------+---------+--------------------+--------------+
|   EXTERNAL LAB |         |                    |              |
+----------------+---------+--------------------+--------------+
 External Lab: ALMA (2017)
 
+-----------+--------+-----------+------------+--------------+
| Component | Value  | Ref Range | Performed  | Pathologist  |
|           |        |           | At         | Signature    |
+-----------+--------+-----------+------------+--------------+
| BUN,      | 42 (A) | 6 - 23    | EXTERNAL   |              |
| External  |        |           | LAB        |              |
+-----------+--------+-----------+------------+--------------+
 
 
 
+--------------------------+
| Resulting Agency Comment |
+--------------------------+
|   [facility]              |
+--------------------------+
 
 
 
+----------------+---------+--------------------+--------------+
| Performing     | Address | City/State/Zipcode | Phone Number |
| Organization   |         |                    |              |
+----------------+---------+--------------------+--------------+
|   EXTERNAL LAB |         |                    |              |
+----------------+---------+--------------------+--------------+
 
 External Lab: Glucose (2017)
 
+-----------+---------+-----------+------------+--------------+
| Component | Value   | Ref Range | Performed  | Pathologist  |
|           |         |           | At         | Signature    |
+-----------+---------+-----------+------------+--------------+
| Glucose,  | 129 (A) | 70 - 100  | EXTERNAL   |              |
| External  |         |           | LAB        |              |
+-----------+---------+-----------+------------+--------------+
 
 
 
+--------------------------+
| Resulting Agency Comment |
+--------------------------+
|   [facility]              |
+--------------------------+
 
 
 
+----------------+---------+--------------------+--------------+
| Performing     | Address | City/State/Zipcode | Phone Number |
| Organization   |         |                    |              |
+----------------+---------+--------------------+--------------+
|   EXTERNAL LAB |         |                    |              |
+----------------+---------+--------------------+--------------+
 External Lab: ALT (2017)
 
+-----------+-------+-----------+------------+--------------+
| Component | Value | Ref Range | Performed  | Pathologist  |
|           |       |           | At         | Signature    |
+-----------+-------+-----------+------------+--------------+
| ALT,      | 16    | 7 - 52    | EXTERNAL   |              |
| External  |       |           | LAB        |              |
+-----------+-------+-----------+------------+--------------+
 
 
 
+--------------------------+
| Resulting Agency Comment |
+--------------------------+
|   [facility]              |
+--------------------------+
 
 
 
+----------------+---------+--------------------+--------------+
| Performing     | Address | City/State/Zipcode | Phone Number |
| Organization   |         |                    |              |
+----------------+---------+--------------------+--------------+
|   EXTERNAL LAB |         |                    |              |
+----------------+---------+--------------------+--------------+
 External Lab: AST (2017)
 
+-----------+-------+-----------+------------+--------------+
| Component | Value | Ref Range | Performed  | Pathologist  |
|           |       |           | At         | Signature    |
+-----------+-------+-----------+------------+--------------+
| AST,      | 22    |           | EXTERNAL   |              |
| External  |       |           | LAB        |              |
 
+-----------+-------+-----------+------------+--------------+
 
 
 
+--------------------------+
| Resulting Agency Comment |
+--------------------------+
|   [facility]              |
+--------------------------+
 
 
 
+----------------+---------+--------------------+--------------+
| Performing     | Address | City/State/Zipcode | Phone Number |
| Organization   |         |                    |              |
+----------------+---------+--------------------+--------------+
|   EXTERNAL LAB |         |                    |              |
+----------------+---------+--------------------+--------------+
 External Lab: Albumin (2017)
 
+-----------+-------+-----------+------------+--------------+
| Component | Value | Ref Range | Performed  | Pathologist  |
|           |       |           | At         | Signature    |
+-----------+-------+-----------+------------+--------------+
| Albumin,  | 3.7   | 3.5 - 5   | EXTERNAL   |              |
| External  |       |           | LAB        |              |
+-----------+-------+-----------+------------+--------------+
 
 
 
+--------------------------+
| Resulting Agency Comment |
+--------------------------+
|   [facility]              |
+--------------------------+
 
 
 
+----------------+---------+--------------------+--------------+
| Performing     | Address | City/State/Zipcode | Phone Number |
| Organization   |         |                    |              |
+----------------+---------+--------------------+--------------+
|   EXTERNAL LAB |         |                    |              |
+----------------+---------+--------------------+--------------+
 External Lab: Protein, Total (2017)
 
+-----------+-------+-----------+------------+--------------+
| Component | Value | Ref Range | Performed  | Pathologist  |
|           |       |           | At         | Signature    |
+-----------+-------+-----------+------------+--------------+
| Protein,  | 6     | 6 - 8     | EXTERNAL   |              |
| Total,    |       |           | LAB        |              |
| External  |       |           |            |              |
+-----------+-------+-----------+------------+--------------+
 
 
 
+--------------------------+
| Resulting Agency Comment |
+--------------------------+
 
|   [facility]              |
+--------------------------+
 
 
 
+----------------+---------+--------------------+--------------+
| Performing     | Address | City/State/Zipcode | Phone Number |
| Organization   |         |                    |              |
+----------------+---------+--------------------+--------------+
|   EXTERNAL LAB |         |                    |              |
+----------------+---------+--------------------+--------------+
 External Lab: Phosphorus (2017)
 
+-------------+-------+-----------+------------+--------------+
| Component   | Value | Ref Range | Performed  | Pathologist  |
|             |       |           | At         | Signature    |
+-------------+-------+-----------+------------+--------------+
| Phosphorus, | 2.9   | 2.5 - 5   | EXTERNAL   |              |
|  External   |       |           | LAB        |              |
+-------------+-------+-----------+------------+--------------+
 
 
 
+--------------------------+
| Resulting Agency Comment |
+--------------------------+
|   [facility]              |
+--------------------------+
 
 
 
+----------------+---------+--------------------+--------------+
| Performing     | Address | City/State/Zipcode | Phone Number |
| Organization   |         |                    |              |
+----------------+---------+--------------------+--------------+
|   EXTERNAL LAB |         |                    |              |
+----------------+---------+--------------------+--------------+
 External Lab: Magnesium (2017)
 
+-------------+-------+-----------+------------+--------------+
| Component   | Value | Ref Range | Performed  | Pathologist  |
|             |       |           | At         | Signature    |
+-------------+-------+-----------+------------+--------------+
| Magnesium,  | 1.9   | 1.7 - 2.5 | EXTERNAL   |              |
| External    |       |           | LAB        |              |
+-------------+-------+-----------+------------+--------------+
 
 
 
+--------------------------+
| Resulting Agency Comment |
+--------------------------+
|   [facility]              |
+--------------------------+
 
 
 
+----------------+---------+--------------------+--------------+
| Performing     | Address | City/State/Zipcode | Phone Number |
| Organization   |         |                    |              |
 
+----------------+---------+--------------------+--------------+
|   EXTERNAL LAB |         |                    |              |
+----------------+---------+--------------------+--------------+
 External Lab: Calcium (2017)
 
+-----------+-------+------------+------------+--------------+
| Component | Value | Ref Range  | Performed  | Pathologist  |
|           |       |            | At         | Signature    |
+-----------+-------+------------+------------+--------------+
| Calcium,  | 9.9   | 8.4 - 10.2 | EXTERNAL   |              |
| External  |       |            | LAB        |              |
+-----------+-------+------------+------------+--------------+
 
 
 
+--------------------------+
| Resulting Agency Comment |
+--------------------------+
|   [facility]              |
+--------------------------+
 
 
 
+----------------+---------+--------------------+--------------+
| Performing     | Address | City/State/Zipcode | Phone Number |
| Organization   |         |                    |              |
+----------------+---------+--------------------+--------------+
|   EXTERNAL LAB |         |                    |              |
+----------------+---------+--------------------+--------------+
 External Lab: Carbon Dioxide (2017)
 
+-----------+-------+-----------+------------+--------------+
| Component | Value | Ref Range | Performed  | Pathologist  |
|           |       |           | At         | Signature    |
+-----------+-------+-----------+------------+--------------+
| Carbon    | 19    | 19 - 31   | EXTERNAL   |              |
| Dioxide,  |       |           | LAB        |              |
| External  |       |           |            |              |
+-----------+-------+-----------+------------+--------------+
 
 
 
+--------------------------+
| Resulting Agency Comment |
+--------------------------+
|   [facility]              |
+--------------------------+
 
 
 
+----------------+---------+--------------------+--------------+
| Performing     | Address | City/State/Zipcode | Phone Number |
| Organization   |         |                    |              |
+----------------+---------+--------------------+--------------+
|   EXTERNAL LAB |         |                    |              |
+----------------+---------+--------------------+--------------+
 External Lab: Chloride (2017)
 
+------------+-------+-----------+------------+--------------+
| Component  | Value | Ref Range | Performed  | Pathologist  |
 
|            |       |           | At         | Signature    |
+------------+-------+-----------+------------+--------------+
| Chloride,  | 110   | 95 - 112  | EXTERNAL   |              |
| External   |       |           | LAB        |              |
+------------+-------+-----------+------------+--------------+
 
 
 
+--------------------------+
| Resulting Agency Comment |
+--------------------------+
|   [facility]              |
+--------------------------+
 
 
 
+----------------+---------+--------------------+--------------+
| Performing     | Address | City/State/Zipcode | Phone Number |
| Organization   |         |                    |              |
+----------------+---------+--------------------+--------------+
|   EXTERNAL LAB |         |                    |              |
+----------------+---------+--------------------+--------------+
 External Lab: Potassium (2017)
 
+-------------+-------+-----------+------------+--------------+
| Component   | Value | Ref Range | Performed  | Pathologist  |
|             |       |           | At         | Signature    |
+-------------+-------+-----------+------------+--------------+
| Potassium,  | 5.1   | 3.6 - 5.1 | EXTERNAL   |              |
| External    |       |           | LAB        |              |
+-------------+-------+-----------+------------+--------------+
 
 
 
+--------------------------+
| Resulting Agency Comment |
+--------------------------+
|   [facility]              |
+--------------------------+
 
 
 
+----------------+---------+--------------------+--------------+
| Performing     | Address | City/State/Zipcode | Phone Number |
| Organization   |         |                    |              |
+----------------+---------+--------------------+--------------+
|   EXTERNAL LAB |         |                    |              |
+----------------+---------+--------------------+--------------+
 External Lab: Sodium (2017)
 
+-----------+-------+-----------+------------+--------------+
| Component | Value | Ref Range | Performed  | Pathologist  |
|           |       |           | At         | Signature    |
+-----------+-------+-----------+------------+--------------+
| Sodium,   | 142   | 132 - 143 | EXTERNAL   |              |
| External  |       |           | LAB        |              |
+-----------+-------+-----------+------------+--------------+
 
 
 
 
+--------------------------+
| Resulting Agency Comment |
+--------------------------+
|   [facility]              |
+--------------------------+
 
 
 
+----------------+---------+--------------------+--------------+
| Performing     | Address | City/State/Zipcode | Phone Number |
| Organization   |         |                    |              |
+----------------+---------+--------------------+--------------+
|   EXTERNAL LAB |         |                    |              |
+----------------+---------+--------------------+--------------+
 External Lab: Triglycerides (2017)
 
+-------------+---------+-----------+------------+--------------+
| Component   | Value   | Ref Range | Performed  | Pathologist  |
|             |         |           | At         | Signature    |
+-------------+---------+-----------+------------+--------------+
| Triglycerid | 190 (A) | 30 - 150  | EXTERNAL   |              |
| es,         |         |           | LAB        |              |
| External    |         |           |            |              |
+-------------+---------+-----------+------------+--------------+
 
 
 
+-----------------+
| Specimen        |
+-----------------+
| Blood specimen  |
| (specimen)      |
+-----------------+
 
 
 
+--------------------------+
| Resulting Agency Comment |
+--------------------------+
|   [facility]              |
+--------------------------+
 
 
 
+----------------+---------+--------------------+--------------+
| Performing     | Address | City/State/Zipcode | Phone Number |
| Organization   |         |                    |              |
+----------------+---------+--------------------+--------------+
|   EXTERNAL LAB |         |                    |              |
+----------------+---------+--------------------+--------------+
 External Lab: Cholesterol, HDL (2017)
 
+-------------+-------+-----------+------------+--------------+
| Component   | Value | Ref Range | Performed  | Pathologist  |
|             |       |           | At         | Signature    |
+-------------+-------+-----------+------------+--------------+
| HDL         | 50.2  | mg/dl     | EXTERNAL   |              |
| Cholesterol |       |           | LAB        |              |
| , External  |       |           |            |              |
+-------------+-------+-----------+------------+--------------+
 
 
 
 
+-----------------+
| Specimen        |
+-----------------+
| Blood specimen  |
| (specimen)      |
+-----------------+
 
 
 
+--------------------------+
| Resulting Agency Comment |
+--------------------------+
|   [facility]              |
+--------------------------+
 
 
 
+----------------+---------+--------------------+--------------+
| Performing     | Address | City/State/Zipcode | Phone Number |
| Organization   |         |                    |              |
+----------------+---------+--------------------+--------------+
|   EXTERNAL LAB |         |                    |              |
+----------------+---------+--------------------+--------------+
 External Lab: Cholesterol, Total (2017)
 
+-------------+-------+-----------+------------+--------------+
| Component   | Value | Ref Range | Performed  | Pathologist  |
|             |       |           | At         | Signature    |
+-------------+-------+-----------+------------+--------------+
| Cholesterol | 143   | 200 mg/dl | EXTERNAL   |              |
| , Total,    |       |           | LAB        |              |
| External    |       |           |            |              |
+-------------+-------+-----------+------------+--------------+
 
 
 
+-----------------+
| Specimen        |
+-----------------+
| Blood specimen  |
| (specimen)      |
+-----------------+
 
 
 
+--------------------------+
| Resulting Agency Comment |
+--------------------------+
|   [facility]              |
+--------------------------+
 
 
 
+----------------+---------+--------------------+--------------+
| Performing     | Address | City/State/Zipcode | Phone Number |
| Organization   |         |                    |              |
+----------------+---------+--------------------+--------------+
 
|   EXTERNAL LAB |         |                    |              |
+----------------+---------+--------------------+--------------+
 External Lab: Cholesterol, LDL Direct (2017)
 
+-------------+-------+-----------+------------+--------------+
| Component   | Value | Ref Range | Performed  | Pathologist  |
|             |       |           | At         | Signature    |
+-------------+-------+-----------+------------+--------------+
| LDL         | 55    | 100       | EXTERNAL   |              |
| Cholesterol |       |           | LAB        |              |
| , Direct,   |       |           |            |              |
| External    |       |           |            |              |
+-------------+-------+-----------+------------+--------------+
 
 
 
+-----------------+
| Specimen        |
+-----------------+
| Blood specimen  |
| (specimen)      |
+-----------------+
 
 
 
+--------------------------+
| Resulting Agency Comment |
+--------------------------+
|   [facility]              |
+--------------------------+
 
 
 
+----------------+---------+--------------------+--------------+
| Performing     | Address | City/State/Zipcode | Phone Number |
| Organization   |         |                    |              |
+----------------+---------+--------------------+--------------+
|   EXTERNAL LAB |         |                    |              |
+----------------+---------+--------------------+--------------+
 External Lab: Cholesterol, LDL (2017)
 
+-------------+-------+-----------+------------+--------------+
| Component   | Value | Ref Range | Performed  | Pathologist  |
|             |       |           | At         | Signature    |
+-------------+-------+-----------+------------+--------------+
| LDL         | 55    |           | EXTERNAL   |              |
| Cholesterol |       |           | LAB        |              |
| , Direct,   |       |           |            |              |
| External    |       |           |            |              |
+-------------+-------+-----------+------------+--------------+
 
 
 
+-----------------+
| Specimen        |
+-----------------+
| Blood specimen  |
| (specimen)      |
+-----------------+
 
 
 
 
+--------------------------+
| Resulting Agency Comment |
+--------------------------+
|   [facility]              |
+--------------------------+
 
 
 
+----------------+---------+--------------------+--------------+
| Performing     | Address | City/State/Zipcode | Phone Number |
| Organization   |         |                    |              |
+----------------+---------+--------------------+--------------+
|   EXTERNAL LAB |         |                    |              |
+----------------+---------+--------------------+--------------+
 External Lab: eGFR (2017)
 
+-----------+-------+-----------+------------+--------------+
| Component | Value | Ref Range | Performed  | Pathologist  |
|           |       |           | At         | Signature    |
+-----------+-------+-----------+------------+--------------+
| eGFR,     | 37    |           | EXTERNAL   |              |
| External  |       |           | LAB        |              |
+-----------+-------+-----------+------------+--------------+
 
 
 
+-----------------+
| Specimen        |
+-----------------+
| Blood specimen  |
| (specimen)      |
+-----------------+
 
 
 
+--------------------------+
| Resulting Agency Comment |
+--------------------------+
|   [facility]              |
+--------------------------+
 
 
 
+----------------+---------+--------------------+--------------+
| Performing     | Address | City/State/Zipcode | Phone Number |
| Organization   |         |                    |              |
+----------------+---------+--------------------+--------------+
|   EXTERNAL LAB |         |                    |              |
+----------------+---------+--------------------+--------------+
 External Lab: Creatinine (2017)
 
+-------------+---------+------------+------------+--------------+
| Component   | Value   | Ref Range  | Performed  | Pathologist  |
|             |         |            | At         | Signature    |
+-------------+---------+------------+------------+--------------+
| Creatinine, | 1.4 (A) | 0.7 - 1.18 | EXTERNAL   |              |
|  External   |         |            | LAB        |              |
+-------------+---------+------------+------------+--------------+
 
 
 
 
+-----------------+
| Specimen        |
+-----------------+
| Blood specimen  |
| (specimen)      |
+-----------------+
 
 
 
+--------------------------+
| Resulting Agency Comment |
+--------------------------+
|   [facility]              |
+--------------------------+
 
 
 
+----------------+---------+--------------------+--------------+
| Performing     | Address | City/State/Zipcode | Phone Number |
| Organization   |         |                    |              |
+----------------+---------+--------------------+--------------+
|   EXTERNAL LAB |         |                    |              |
+----------------+---------+--------------------+--------------+
 documented in this encounter
 
 Visit Diagnoses
 Not on filedocumented in this encounter

## 2020-01-08 NOTE — XMS
Encounter Summary
  Created on: 2020
 
 Viviana Ricci
 External Reference #: [id number]
 : 47
 Sex: Female
 
 Demographics
 
 
+-----------------------+----------------------+
| Address               | [address]      |
|                       | [place]  [address] |
+-----------------------+----------------------+
| Home Phone            | [phone]      |
+-----------------------+----------------------+
| Preferred Language    | Unknown              |
+-----------------------+----------------------+
| Marital Status        | Single               |
+-----------------------+----------------------+
| Buddhism Affiliation | 1009                 |
+-----------------------+----------------------+
| Race                  | Unknown              |
+-----------------------+----------------------+
| Ethnic Group          | Unknown              |
+-----------------------+----------------------+
 
 
 Author
 
 
+--------------+--------------------------------------------+
| Author       | [facility]  |
|              | and [facility]                                |
+--------------+--------------------------------------------+
| Organization | [facility]  |
|              | and [facility]                                |
+--------------+--------------------------------------------+
| Address      | Unknown                                    |
+--------------+--------------------------------------------+
| Phone        | Unavailable                                |
+--------------+--------------------------------------------+
 
 
 
 Support
 
 
+----------------+--------------+---------------------+-----------------+
| Name           | Relationship | Address             | Phone           |
+----------------+--------------+---------------------+-----------------+
| Ada/Ed Radhames | ECON         | [address]              | [phone] |
|                |              | [place]  |                 |
|                |              |  [address]              |                 |
+----------------+--------------+---------------------+-----------------+
 
 
 
 
 Care Team Providers
 
 
+------------------------+------+-----------------+
| Care Team Member Name  | Role | Phone           |
+------------------------+------+-----------------+
| Marzena Moser DO | PCP  | [phone] |
+------------------------+------+-----------------+
 
 
 
 Encounter Details
 
 
+--------+-------------+---------------------+----------------------+----------------------+
| Date   | Type        | Department          | Care Team            | Description          |
+--------+-------------+---------------------+----------------------+----------------------+
| 10/19/ | Orders Only |   [facility]         |   Marzena Moser  | Back pain,           |
| 2018   |             | NEPHROLOGY  [address]   | M, DO  [address]      | unspecified back     |
|        |             | [address]   | [place]      | location,            |
|        |             | [place], [place]     | [place], [place]      | unspecified back     |
|        |             | [address]          | [address]  [phone]  | pain laterality,     |
|        |             | [phone]        |  [phone] (Fax)  | unspecified          |
|        |             |                     |                      | chronicity (Primary  |
|        |             |                     |                      | Dx)                  |
+--------+-------------+---------------------+----------------------+----------------------+
 
 
 
 Social History
 
 
+--------------+-------+-----------+--------+------+
| Tobacco Use  | Types | Packs/Day | Years  | Date |
|              |       |           | Used   |      |
+--------------+-------+-----------+--------+------+
| Never Smoker |       |           |        |      |
+--------------+-------+-----------+--------+------+
 
 
 
+---------------------+---+---+---+
| Smokeless Tobacco:  |   |   |   |
| Never Used          |   |   |   |
+---------------------+---+---+---+
 
 
 
+---------------------------------------------------------------+
| Comments: some second hand smoke exposure, but fairly minimal |
+---------------------------------------------------------------+
 
 
 
+-------------+-------------+---------+----------+
| Alcohol Use | Drinks/Week | oz/Week | Comments |
+-------------+-------------+---------+----------+
| No          |             |         |          |
+-------------+-------------+---------+----------+
 
 
 
 
+------------------+---------------+
| Sex Assigned at  | Date Recorded |
| Birth            |               |
+------------------+---------------+
| Not on file      |               |
+------------------+---------------+
 
 
 
+----------------+-------------+-------------+
| Job Start Date | Occupation  | Industry    |
+----------------+-------------+-------------+
| Not on file    | Not on file | Not on file |
+----------------+-------------+-------------+
 
 
 
+----------------+--------------+------------+
| Travel History | Travel Start | Travel End |
+----------------+--------------+------------+
 
 
 
+-------------------------------------+
| No recent travel history available. |
+-------------------------------------+
 documented as of this encounter
 
 Plan of Treatment
 
 
+--------+-----------+------------+----------------------+-------------------+
| Date   | Type      | Specialty  | Care Team            | Description       |
+--------+-----------+------------+----------------------+-------------------+
| / | Office    | Cardiology |   Tonia Wilson,    |                   |
|    | Visit     |            | ARNP  [address]   |                   |
|        |           |            | St  [place], [place]  |                   |
|        |           |            | [address]  [phone]  |                   |
|        |           |            |  [phone] (Fax)  |                   |
+--------+-----------+------------+----------------------+-------------------+
| / | Hospital  | Radiology  |   Popeye Townsend, |                   |
|    | Encounter |            |  MD  [address] [place] |                   |
|        |           |            |  [place],   |                   |
|        |           |            | [place] [address]             |                   |
|        |           |            | [phone]         |                   |
|        |           |            | [phone] (Fax)   |                   |
+--------+-----------+------------+----------------------+-------------------+
| / | Surgery   | Radiology  |   Popeye Townsend, | CV EP PPM SYSTEM  |
|    |           |            |  MD  [address] | IMPLANT           |
|        |           |            |  St.  [place],   |                   |
|        |           |            | [place] [address]             |                   |
|        |           |            | [phone]         |                   |
|        |           |            | [phone] (Fax)   |                   |
+--------+-----------+------------+----------------------+-------------------+
| 02/10/ | Clinical  | Cardiology |                      |                   |
|    | Support   |            |                      |                   |
+--------+-----------+------------+----------------------+-------------------+
 
| / | Office    | Cardiology |   Tonia Wilson,    |                   |
|    | Visit     |            | [facility]  [address]   |                   |
|        |           |            | [place]  |                   |
|        |           |            | [address]  [phone]  |                   |
|        |           |            |  [phone] (Fax)  |                   |
+--------+-----------+------------+----------------------+-------------------+
| / | Off-Site  | Nephrology |   Marzena Moser  |                   |
|    | Visit     |            | DO ETIENNE  [address] [place] [address]      |                   |
|        |           |            | [address]      |                   |
|        |           |            | [place]      |                   |
|        |           |            | [address]  [phone]  |                   |
|        |           |            |  [phone] (Fax)  |                   |
+--------+-----------+------------+----------------------+-------------------+
 documented as of this encounter
 
 Visit Diagnoses
 
 
+----------------------------------------------------------------------------------------+
| Diagnosis                                                                              |
+----------------------------------------------------------------------------------------+
|   Back pain, unspecified back location, unspecified back pain laterality, unspecified  |
| chronicity - Primary                                                                   |
+----------------------------------------------------------------------------------------+
 documented in this encounter

## 2020-01-08 NOTE — XMS
Encounter Summary
  Created on: 2020
 
 Viviana Ricci
 External Reference #: [id number]
 : 47
 Sex: Female
 
 Demographics
 
 
+-----------------------+----------------------+
| Address               | [address]      |
|                       | [place]  [address] |
+-----------------------+----------------------+
| Home Phone            | [phone]      |
+-----------------------+----------------------+
| Preferred Language    | Unknown              |
+-----------------------+----------------------+
| Marital Status        | Single               |
+-----------------------+----------------------+
| [employer] Affiliation | 1009                 |
+-----------------------+----------------------+
| Race                  | Unknown              |
+-----------------------+----------------------+
| Ethnic Group          | Unknown              |
+-----------------------+----------------------+
 
 
 Author
 
 
+--------------+--------------------------------------------+
| Author       | [facility]  |
|              | and [facility]                                |
+--------------+--------------------------------------------+
| Organization | [facility]  |
|              | and [facility]                                |
+--------------+--------------------------------------------+
| Address      | Unknown                                    |
+--------------+--------------------------------------------+
| Phone        | Unavailable                                |
+--------------+--------------------------------------------+
 
 
 
 Support
 
 
+----------------+--------------+---------------------+-----------------+
| Name           | Relationship | Address             | Phone           |
+----------------+--------------+---------------------+-----------------+
| Ada/Ed Radhames | ECON         | [address]              | [phone] |
|                |              | [place]  |                 |
|                |              |  [address]              |                 |
+----------------+--------------+---------------------+-----------------+
 
 
 
 
 Care Team Providers
 
 
+-----------------------+------+-------------+
| Care Team Member Name | Role | Phone       |
+-----------------------+------+-------------+
 PCP  | Unavailable |
+-----------------------+------+-------------+
 
 
 
 Reason for Visit
 
 
+-------------------+----------+
| Reason            | Comments |
+-------------------+----------+
| Medication Refill |          |
+-------------------+----------+
 
 
 
 Encounter Details
 
 
+--------+--------+---------------------+----------------------+-------------------+
| Date   | Type   | Department          | Care Team            | Description       |
+--------+--------+---------------------+----------------------+-------------------+
| / | Refill |   [place]         |   Marzena Moser  | Medication Refill |
|    |        | NEPHROLOGY  [address]   | M, DO  [address]      |                   |
|        |        | [address]   | [address]      |                   |
|        |        | [place], [place]     | [place], [place]      |                   |
|        |        | [address]          | [address]  [phone]  |                   |
|        |        | [phone]        |  [phone] (Fax)  |                   |
+--------+--------+---------------------+----------------------+-------------------+
 
 
 
 Social History
 
 
+--------------+-------+-----------+--------+------+
| Tobacco Use  | Types | Packs/Day | Years  | Date |
|              |       |           | Used   |      |
+--------------+-------+-----------+--------+------+
| Never Smoker |       |           |        |      |
+--------------+-------+-----------+--------+------+
 
 
 
+---------------------+---+---+---+
| Smokeless Tobacco:  |   |   |   |
| Never Used          |   |   |   |
+---------------------+---+---+---+
 
 
 
+---------------------------------------------------------------+
| Comments: some second hand smoke exposure, but fairly minimal |
 
+---------------------------------------------------------------+
 
 
 
+-------------+-------------+---------+----------+
| Alcohol Use | Drinks/Week | oz/Week | Comments |
+-------------+-------------+---------+----------+
| No          |             |         |          |
+-------------+-------------+---------+----------+
 
 
 
+------------------+---------------+
| Sex Assigned at  | Date Recorded |
| Birth            |               |
+------------------+---------------+
| Not on file      |               |
+------------------+---------------+
 
 
 
+----------------+-------------+-------------+
| Job Start Date | Occupation  | Industry    |
+----------------+-------------+-------------+
| Not on file    | Not on file | Not on file |
+----------------+-------------+-------------+
 
 
 
+----------------+--------------+------------+
| Travel History | Travel Start | Travel End |
+----------------+--------------+------------+
 
 
 
+-------------------------------------+
| No recent travel history available. |
+-------------------------------------+
 documented as of this encounter
 
 Plan of Treatment
 
 
+--------+-----------+------------+----------------------+-------------------+
| Date   | Type      | Specialty  | Care Team            | Description       |
+--------+-----------+------------+----------------------+-------------------+
| / | Office    | Cardiology |   HellalfredoTonia,    |                   |
|    | Visit     |            | ARNP  [address]   |                   |
|        |           |            | St  [place], [place]  |                   |
|        |           |            | [address]  [phone]  |                   |
|        |           |            |  [phone] (Fax)  |                   |
+--------+-----------+------------+----------------------+-------------------+
| / | Hospital  | Radiology  |   Popeye Townsend, |                   |
|    | Encounter |            |  MD  [address] |                   |
|        |           |            |  St.  [place],   |                   |
|        |           |            | [place] [address]             |                   |
|        |           |            | [phone]         |                   |
|        |           |            | [phone] (Fax)   |                   |
+--------+-----------+------------+----------------------+-------------------+
| / | Surgery   | Radiology  |   Popeye Townsend, | CV EP PPM SYSTEM  |
 
|    |           |            |  MD  [address] | IMPLANT           |
|        |           |            |  St.  [place],   |                   |
|        |           |            | [place] [address]             |                   |
|        |           |            | [phone]         |                   |
|        |           |            | [phone] (Fax)   |                   |
+--------+-----------+------------+----------------------+-------------------+
| 02/10/ | Clinical  | Cardiology |                      |                   |
|    | Support   |            |                      |                   |
+--------+-----------+------------+----------------------+-------------------+
| / | Office    | Cardiology |   Tonia Wilson,    |                   |
|    | Visit     |            | ARNP  [address]   |                   |
|        |           |            | [place]  |                   |
|        |           |            | [address]  [phone]  |                   |
|        |           |            |  [phone] (Fax)  |                   |
+--------+-----------+------------+----------------------+-------------------+
| / | Off-Site  | Nephrology |   Marzena Moser  |                   |
|    | Visit     |            | DO ETIENNE  [address] [place] [address]      |                   |
|        |           |            | [address]      |                   |
|        |           |            | [place]      |                   |
|        |           |            | [address]  [phone]  |                   |
|        |           |            |  [phone] (Fax)  |                   |
+--------+-----------+------------+----------------------+-------------------+
 documented as of this encounter
 
 Visit Diagnoses
 
 
+------------------------------------------------------------------------------------------+
| Diagnosis                                                                                |
+------------------------------------------------------------------------------------------+
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
| stage IV, or unspecified(403.90) - Primary  Unspecified hypertensive kidney disease with |
|  chronic kidney disease stage I through stage IV, or unspecified                         |
+------------------------------------------------------------------------------------------+
|   Complications of transplanted kidney                                                   |
+------------------------------------------------------------------------------------------+
|   DIABETES MELLITUS, TYPE II, UNCONTROLLED  Type II or unspecified type diabetes         |
| mellitus without mention of complication, uncontrolled                                   |
+------------------------------------------------------------------------------------------+
|   Hyperlipidemia  Other and unspecified hyperlipidemia                                   |
+------------------------------------------------------------------------------------------+
 documented in this encounter

## 2020-01-08 NOTE — XMS
Encounter Summary
  Created on: 2020
 
 Viviana Ricci
 External Reference #: [id number]
 : 47
 Sex: Female
 
 Demographics
 
 
+-----------------------+----------------------+
| Address               | [address]      |
|                       | [place]  [address] |
+-----------------------+----------------------+
| Home Phone            | [phone]      |
+-----------------------+----------------------+
| Preferred Language    | Unknown              |
+-----------------------+----------------------+
| Marital Status        | Single               |
+-----------------------+----------------------+
| Cheondoism Affiliation | [id number]                 |
+-----------------------+----------------------+
| Race                  | Unknown              |
+-----------------------+----------------------+
| Ethnic Group          | Unknown              |
+-----------------------+----------------------+
 
 
 Author
 
 
+--------------+--------------------------------------------+
| Author       | [facility]  |
|              | and [place]                                |
+--------------+--------------------------------------------+
| Organization | [facility]  |
|              | and [place]                                |
+--------------+--------------------------------------------+
| Address      | Unknown                                    |
+--------------+--------------------------------------------+
| Phone        | Unavailable                                |
+--------------+--------------------------------------------+
 
 
 
 Support
 
 
+----------------+--------------+---------------------+-----------------+
| Name           | Relationship | Address             | Phone           |
+----------------+--------------+---------------------+-----------------+
| Ada/Ed Radhames | ECON         | [address]              | [phone] |
|                |              | [place]  |                 |
|                |              |  [address]              |                 |
+----------------+--------------+---------------------+-----------------+
 
 
 
 
 Care Team Providers
 
 
+-----------------------+------+-------------+
| Care Team Member Name | Role | Phone       |
+-----------------------+------+-------------+
 PCP  | Unavailable |
+-----------------------+------+-------------+
 
 
 
 Encounter Details
 
 
+--------+-----------+----------------------+---------------------+-------------+
| Date   | Type      | Department           | Care Team           | Description |
+--------+-----------+----------------------+---------------------+-------------+
| / | [facility]  |   [facility] |   Guillaume Arzate  |             |
|    | Encounter |  MED CTR SLEEP       | MD Jean Pierre  [address]   |             |
|        |           | [address] [place] | [place]    |             |
|        |           |   [place]    | [place] [address]     |             |
|        |           | [address]           | [phone]        |             |
|        |           | [phone]         | [phone] (Fax)  |             |
+--------+-----------+----------------------+---------------------+-------------+
 
 
 
 Social History
 
 
+----------------+-------+-----------+--------+------+
| Tobacco Use    | Types | Packs/Day | Years  | Date |
|                |       |           | Used   |      |
+----------------+-------+-----------+--------+------+
| Never Assessed |       |           |        |      |
+----------------+-------+-----------+--------+------+
 
 
 
+------------------+---------------+
| Sex Assigned at  | Date Recorded |
| Birth            |               |
+------------------+---------------+
| Not on file      |               |
+------------------+---------------+
 
 
 
+----------------+-------------+-------------+
| Job Start Date | Occupation  | Industry    |
+----------------+-------------+-------------+
| Not on file    | Not on file | Not on file |
+----------------+-------------+-------------+
 
 
 
+----------------+--------------+------------+
| Travel History | Travel Start | Travel End |
+----------------+--------------+------------+
 
 
 
 
+-------------------------------------+
| No recent travel history available. |
+-------------------------------------+
 documented as of this encounter
 
 Plan of Treatment
 
 
+--------+-----------+------------+----------------------+-------------------+
| Date   | Type      | Specialty  | Care Team            | Description       |
+--------+-----------+------------+----------------------+-------------------+
| / | Office    | Cardiology |   Tonia Wilson,    |                   |
|    | Visit     |            | [address]   |                   |
|        |           |            | St  [place], [place]  |                   |
|        |           |            | [address]  [phone]  |                   |
|        |           |            |  [phone] (Fax)  |                   |
+--------+-----------+------------+----------------------+-------------------+
| / | Hospital  | Radiology  |   Popeye Townsend, |                   |
|    | Encounter |            |  MD  [address] [place] |                   |
|        |           |            |  [place],   |                   |
|        |           |            | [place] [address]             |                   |
|        |           |            | [phone]         |                   |
|        |           |            | [phone] (Fax)   |                   |
+--------+-----------+------------+----------------------+-------------------+
| / | Surgery   | Radiology  |   Popeye Townsend, | CV EP PPM SYSTEM  |
|    |           |            |  MD  [address] | IMPLANT           |
|        |           |            |  St.  [place],   |                   |
|        |           |            | [place] [address]             |                   |
|        |           |            | [phone]         |                   |
|        |           |            | [phone] (Fax)   |                   |
+--------+-----------+------------+----------------------+-------------------+
| 02/10/ | Clinical  | Cardiology |                      |                   |
|    | Support   |            |                      |                   |
+--------+-----------+------------+----------------------+-------------------+
| / | Office    | Cardiology |   Tonia Wilson,    |                   |
|    | Visit     |            | BELKIS  [address]   |                   |
|        |           |            | [place]  |                   |
|        |           |            | [address]  [phone]  |                   |
|        |           |            |  [phone] (Fax)  |                   |
+--------+-----------+------------+----------------------+-------------------+
| / | Off-Site  | Nephrology |   Marzena Moser  |                   |
|    | Visit     |            | DO ETIENNE  [address] [place] [address]      |                   |
|        |           |            | [address]      |                   |
|        |           |            | [place]      |                   |
|        |           |            | [address] [phone]  |                   |
|        |           |            |  [phone] (Fax)  |                   |
+--------+-----------+------------+----------------------+-------------------+
 documented as of this encounter
 
 Visit Diagnoses
 Not on filedocumented in this encounter

## 2020-01-08 NOTE — XMS
Encounter Summary
  Created on: 2020
 
 Viviana Ricci
 External Reference #: [id number]
 : 47
 Sex: Female
 
 Demographics
 
 
+-----------------------+----------------------+
| Address               | [address]      |
|                       | [place]  [address] |
+-----------------------+----------------------+
| Home Phone            | [phone]      |
+-----------------------+----------------------+
| Preferred Language    | Unknown              |
+-----------------------+----------------------+
| Marital Status        | Single               |
+-----------------------+----------------------+
| Lutheran Affiliation | [id number]                 |
+-----------------------+----------------------+
| Race                  | Unknown              |
+-----------------------+----------------------+
| Ethnic Group          | Unknown              |
+-----------------------+----------------------+
 
 
 Author
 
 
+--------------+--------------------------------------------+
| Author       | [facility]  |
|              | and [place]                                |
+--------------+--------------------------------------------+
| Organization | [facility]  |
|              | and [place]                                |
+--------------+--------------------------------------------+
| Address      | Unknown                                    |
+--------------+--------------------------------------------+
| Phone        | Unavailable                                |
+--------------+--------------------------------------------+
 
 
 
 Support
 
 
+----------------+--------------+---------------------+-----------------+
| Name           | Relationship | Address             | Phone           |
+----------------+--------------+---------------------+-----------------+
| Ada/Ed Radhames | ECON         | [address]              | [phone] |
|                |              | [place]  |                 |
|                |              |  [address]              |                 |
+----------------+--------------+---------------------+-----------------+
 
 
 
 
 Care Team Providers
 
 
+-----------------------+------+-------------+
| Care Team Member Name | Role | Phone       |
+-----------------------+------+-------------+
 PCP  | Unavailable |
+-----------------------+------+-------------+
 
 
 
 Reason for Visit
 
 
+--------+-------------------+
| Reason | Comments          |
+--------+-------------------+
| Other  | exertional oxygen |
+--------+-------------------+
 
 
 
 Encounter Details
 
 
+--------+-----------+----------------------+----------------+--------------------+
| Date   | Type      | Department           | Care Team      | Description        |
+--------+-----------+----------------------+----------------+--------------------+
| / | Telephone |   [facility] [place]          |   Offenstein,  | Other (exertional  |
|    |           | PULMONARY  [address]     | Nena GUSMAN MD  | oxygen)            |
|        |           | [place]  [place], |                |                    |
|        |           |  [place] [address]       |                |                    |
|        |           | [phone]         |                |                    |
+--------+-----------+----------------------+----------------+--------------------+
 
 
 
 Social History
 
 
+--------------+-------+-----------+--------+------+
| Tobacco Use  | Types | Packs/Day | Years  | Date |
|              |       |           | Used   |      |
+--------------+-------+-----------+--------+------+
| Never Smoker |       |           |        |      |
+--------------+-------+-----------+--------+------+
 
 
 
+---------------------+---+---+---+
| Smokeless Tobacco:  |   |   |   |
| Never Used          |   |   |   |
+---------------------+---+---+---+
 
 
 
+---------------------------------------------------------------+
| Comments: some second hand smoke exposure, but fairly minimal |
+---------------------------------------------------------------+
 
 
 
 
+-------------+-------------+---------+----------+
| Alcohol Use | Drinks/Week | oz/Week | Comments |
+-------------+-------------+---------+----------+
| No          |             |         |          |
+-------------+-------------+---------+----------+
 
 
 
+------------------+---------------+
| Sex Assigned at  | Date Recorded |
| Birth            |               |
+------------------+---------------+
| Not on file      |               |
+------------------+---------------+
 
 
 
+----------------+-------------+-------------+
| Job Start Date | Occupation  | Industry    |
+----------------+-------------+-------------+
| Not on file    | Not on file | Not on file |
+----------------+-------------+-------------+
 
 
 
+----------------+--------------+------------+
| Travel History | Travel Start | Travel End |
+----------------+--------------+------------+
 
 
 
+-------------------------------------+
| No recent travel history available. |
+-------------------------------------+
 documented as of this encounter
 
 Plan of Treatment
 
 
+--------+-----------+------------+----------------------+-------------------+
| Date   | Type      | Specialty  | Care Team            | Description       |
+--------+-----------+------------+----------------------+-------------------+
| / | Office    | Cardiology |   Tonia Wilson,    |                   |
| 2020   | Visit     |            | ARNP  [address]   |                   |
|        |           |            | St  [place]  |                   |
|        |           |            | [address]  [phone]  |                   |
|        |           |            |  [phone] (Fax)  |                   |
+--------+-----------+------------+----------------------+-------------------+
| / | Hospital  | Radiology  |   Popeye Townsend, |                   |
|    | Encounter |            |  MD  [address] [place] |                   |
|        |           |            |  St.  [place],   |                   |
|        |           |            | [place] [address]             |                   |
|        |           |            | [phone]         |                   |
|        |           |            | [phone] (Fax)   |                   |
+--------+-----------+------------+----------------------+-------------------+
| / | Surgery   | Radiology  |   Popeye Townsend, | CV EP PPM SYSTEM  |
|    |           |            |  MD  [address] | IMPLANT           |
 
|        |           |            |  St.  [place],   |                   |
|        |           |            | [place] [address]             |                   |
|        |           |            | [phone]         |                   |
|        |           |            | [phone] (Fax)   |                   |
+--------+-----------+------------+----------------------+-------------------+
| 02/10/ | Clinical  | Cardiology |                      |                   |
|    | Support   |            |                      |                   |
+--------+-----------+------------+----------------------+-------------------+
| / | Office    | Cardiology |   Tonia Wilson,    |                   |
|    | Visit     |            | BELKIS  [address]   |                   |
|        |           |            | [place]  |                   |
|        |           |            | [address]  [phone]  |                   |
|        |           |            |  [phone] (Fax)  |                   |
+--------+-----------+------------+----------------------+-------------------+
| / | Off-Site  | Nephrology |   Marzena Moser  |                   |
|    | Visit     |            | DO ETIENNE  [address] [place] [address]      |                   |
|        |           |            | [address]      |                   |
|        |           |            | [place]      |                   |
|        |           |            | [address]  [phone]  |                   |
|        |           |            |  [phone] (Fax)  |                   |
+--------+-----------+------------+----------------------+-------------------+
 documented as of this encounter
 
 Visit Diagnoses
 Not on filedocumented in this encounter

## 2020-01-08 NOTE — XMS
Encounter Summary
  Created on: 2020
 
 Viviana Ricci
 External Reference #: [id number]
 : 47
 Sex: Female
 
 Demographics
 
 
+-----------------------+----------------------+
| Address               | [address]      |
|                       | [place]  [address] |
+-----------------------+----------------------+
| Home Phone            | [phone]      |
+-----------------------+----------------------+
| Preferred Language    | Unknown              |
+-----------------------+----------------------+
| Marital Status        | Single               |
+-----------------------+----------------------+
| Confucianism Affiliation | [id number]                 |
+-----------------------+----------------------+
| Race                  | Unknown              |
+-----------------------+----------------------+
| Ethnic Group          | Unknown              |
+-----------------------+----------------------+
 
 
 Author
 
 
+--------------+--------------------------------------------+
| Author       | [facility]  |
|              | and [facility]                                |
+--------------+--------------------------------------------+
| Organization | [facility]  |
|              | and [facility]                                |
+--------------+--------------------------------------------+
| Address      | Unknown                                    |
+--------------+--------------------------------------------+
| Phone        | Unavailable                                |
+--------------+--------------------------------------------+
 
 
 
 Support
 
 
+----------------+--------------+---------------------+-----------------+
| Name           | Relationship | Address             | Phone           |
+----------------+--------------+---------------------+-----------------+
| Ada/Ed Radhames | ECON         | [address]              | [phone] |
|                |              | [place]  |                 |
|                |              |  [id number]              |                 |
+----------------+--------------+---------------------+-----------------+
 
 
 
 
 Care Team Providers
 
 
+------------------------+------+-----------------+
| Care Team Member Name  | Role | Phone           |
+------------------------+------+-----------------+
| Marzena Moser PCP  | [phone] |
+------------------------+------+-----------------+
 
 
 
 Reason for Referral
 Diagnostic/Screening (Routine)
 
+--------+--------+-----------+--------------+--------------+---------------+
| Status | Reason | Specialty | Diagnoses /  | Referred By  | Referred To   |
|        |        |           | Procedures   | Contact      | Contact       |
+--------+--------+-----------+--------------+--------------+---------------+
| Closed |        | Radiology |   Diagnoses  |   [place],  |   [facility]    |
|        |        |           |  Coronary    | Tonia, ARNP   | [address]  |
|        |        |           | artery       | [address] |  [place], |
|        |        |           | disease      |  [place]  [place]   |  [place]           |
|        |        |           | involving    | [place]    | [address]    |
|        |        |           | native       | [address]        | Phone:        |
|        |        |           | coronary     | Phone:       | [phone]  |
|        |        |           | artery of    | [phone] |  Fax:         |
|        |        |           | native heart |   Fax:       | [phone]  |
|        |        |           |  without     | [phone] |               |
|        |        |           | angina       |              |               |
|        |        |           | pectoris     |              |               |
|        |        |           | Hypertension |              |               |
|        |        |           | , essential  |              |               |
|        |        |           |  Mixed       |              |               |
|        |        |           | hyperlipidem |              |               |
|        |        |           | ia  PVC      |              |               |
|        |        |           | (premature   |              |               |
|        |        |           | ventricular  |              |               |
|        |        |           | contraction) |              |               |
|        |        |           |   Procedures |              |               |
|        |        |           |   ECHO       |              |               |
|        |        |           | Complete     |              |               |
+--------+--------+-----------+--------------+--------------+---------------+
 
 
 
 
 Reason for Visit
 Diagnostic/Screening (Routine)
 
+--------+--------+-----------+--------------+--------------+---------------+
| Status | Reason | Specialty | Diagnoses /  | Referred By  | Referred To   |
|        |        |           | Procedures   | Contact      | Contact       |
+--------+--------+-----------+--------------+--------------+---------------+
| Closed |        | Radiology |   Diagnoses  |   [place],  |   [facility]    |
|        |        |           |  Coronary    | Tonia, ARNP   | [address]  |
|        |        |           | artery       | [address] |  [place], |
|        |        |           | disease      |  St  [place]   |  [place]           |
|        |        |           | involving    | [place]    | [address]    |
|        |        |           | native       | [address]        | Phone:        |
 
|        |        |           | coronary     | Phone:       | [phone]  |
|        |        |           | artery of    | [phone] |  Fax:         |
|        |        |           | native heart |   Fax:       | [phone]  |
|        |        |           |  without     | [phone] |               |
|        |        |           | angina       |              |               |
|        |        |           | pectoris     |              |               |
|        |        |           | Hypertension |              |               |
|        |        |           | , essential  |              |               |
|        |        |           |  Mixed       |              |               |
|        |        |           | hyperlipidem |              |               |
|        |        |           | ia  PVC      |              |               |
|        |        |           | (premature   |              |               |
|        |        |           | ventricular  |              |               |
|        |        |           | contraction) |              |               |
|        |        |           |   Procedures |              |               |
|        |        |           |   ECHO       |              |               |
|        |        |           | Complete     |              |               |
+--------+--------+-----------+--------------+--------------+---------------+
 
 
 
 
 Encounter Details
 
 
+--------+-----------+----------------------+----------------------+----------------------+
| Date   | Type      | Department           | Care Team            | Description          |
+--------+-----------+----------------------+----------------------+----------------------+
| 10/30/ | Hospital  |   [facility] |   Tonia Wilson,    | Coronary artery      |
| 2019   | Encounter |  [facility]  [address] ARNP  [address]   | disease involving    |
|        |           |  [address]  [place]       | St  [place]  | native coronary      |
|        |           | [place] [address] | [address]  [phone]  | artery of native     |
|        |           |   [phone]       |  [phone] (Fax)  | heart without angina |
|        |           |                      |                      |  pectoris;           |
|        |           |                      |                      | Hypertension,        |
|        |           |                      |                      | essential; Mixed     |
|        |           |                      |                      | hyperlipidemia; PVC  |
|        |           |                      |                      | (premature           |
|        |           |                      |                      | ventricular          |
|        |           |                      |                      | contraction)         |
+--------+-----------+----------------------+----------------------+----------------------+
 
 
 
 Social History
 
 
+--------------+-------+-----------+--------+------+
| Tobacco Use  | Types | Packs/Day | Years  | Date |
|              |       |           | Used   |      |
+--------------+-------+-----------+--------+------+
| Never Smoker |       |           |        |      |
+--------------+-------+-----------+--------+------+
 
 
 
+---------------------+---+---+---+
| Smokeless Tobacco:  |   |   |   |
| Never Used          |   |   |   |
+---------------------+---+---+---+
 
 
 
 
+---------------------------------------------------------------+
| Comments: some second hand smoke exposure, but fairly minimal |
+---------------------------------------------------------------+
 
 
 
+-------------+-------------+---------+----------+
| Alcohol Use | Drinks/Week | oz/Week | Comments |
+-------------+-------------+---------+----------+
| No          |             |         |          |
+-------------+-------------+---------+----------+
 
 
 
+------------------+---------------+
| Sex Assigned at  | Date Recorded |
| Birth            |               |
+------------------+---------------+
| Not on file      |               |
+------------------+---------------+
 
 
 
+----------------+-------------+-------------+
| Job Start Date | Occupation  | Industry    |
+----------------+-------------+-------------+
| Not on file    | Not on file | Not on file |
+----------------+-------------+-------------+
 
 
 
+----------------+--------------+------------+
| Travel History | Travel Start | Travel End |
+----------------+--------------+------------+
 
 
 
+-------------------------------------+
| No recent travel history available. |
+-------------------------------------+
 documented as of this encounter
 
 Medications at Time of Discharge
 
 
+----------------------+----------------------+-----------+---------+----------+-----------+
| Medication           | Sig                  | Dispensed | Refills | Start    | End Date  |
|                      |                      |           |         | Date     |           |
+----------------------+----------------------+-----------+---------+----------+-----------+
|   allopurinol        | take 1 tablet by     |   30      | 11      | 20 |           |
| (ZYLOPRIM) 100 mg    | mouth once daily     | tablet    |         | 18       |           |
| tablet               |                      |           |         |          |           |
+----------------------+----------------------+-----------+---------+----------+-----------+
|   aspirin 81 mg EC   | Take 81 mg by mouth  |           | 0       |          |           |
| tablet               | Daily.               |           |         |          |           |
+----------------------+----------------------+-----------+---------+----------+-----------+
|   B-D INS SYRINGE    | USE BEFORE MEALS AS  |   1 each  | 11      | 20 |           |
 
| 0.5CC/31GX5/16 31G X | DIRECTED             |           |         | 18       |           |
|  " 0.5 ML MISC   |                      |           |         |          |           |
+----------------------+----------------------+-----------+---------+----------+-----------+
|   cholecalciferol    | Take 2,000 Units by  |           | 0       |          |           |
| (VITAMIN D-3) 2000   | mouth Every other    |           |         |          |           |
| UNITS                | day.                 |           |         |          |           |
| TABSIndications:     |                      |           |         |          |           |
| Unspecified          |                      |           |         |          |           |
| hypertensive kidney  |                      |           |         |          |           |
| disease with chronic |                      |           |         |          |           |
|  kidney disease      |                      |           |         |          |           |
| stage I through      |                      |           |         |          |           |
| stage IV, or         |                      |           |         |          |           |
| unspecified(403.90), |                      |           |         |          |           |
|  FSGS (focal         |                      |           |         |          |           |
| segmental            |                      |           |         |          |           |
| glomerulosclerosis), |                      |           |         |          |           |
|  Hyperlipidemia,     |                      |           |         |          |           |
| Complications of     |                      |           |         |          |           |
| transplanted kidney  |                      |           |         |          |           |
+----------------------+----------------------+-----------+---------+----------+-----------+
|   cinacalcet         | take 1 tablet by     |   90      | 3       | 20 |           |
| (SENSIPAR) 30 mg     | mouth once daily     | tablet    |         | 19       |           |
| tablet               |                      |           |         |          |           |
+----------------------+----------------------+-----------+---------+----------+-----------+
|   cyclobenzaprine    | Take 1 tablet by     |   45      | 0       | 20 |           |
| (FLEXERIL) 10 mg     | mouth Twice  daily   | tablet    |         | 19       |           |
| tablet               | as needed for Muscle |           |         |          |           |
|                      |  spasms.             |           |         |          |           |
+----------------------+----------------------+-----------+---------+----------+-----------+
|   fludrocortisone    | Take 1 tablet by     |   45      | 3       | 20 |           |
| (FLORINEF) 0.1 mg    | mouth Every other    | tablet    |         | 19       |           |
| tablet               | day.                 |           |         |          |           |
+----------------------+----------------------+-----------+---------+----------+-----------+
|   furosemide (LASIX) | Take 1 tablet by     |   30      | 11      | 20 |           |
|  40 mg               | mouth Daily as       | tablet    |         | 18       |           |
| tabletIndications:   | needed.              |           |         |          |           |
| Edema, unspecified   |                      |           |         |          |           |
| type, FSGS (focal    |                      |           |         |          |           |
| segmental            |                      |           |         |          |           |
| glomerulosclerosis), |                      |           |         |          |           |
|  Hyperlipidemia      |                      |           |         |          |           |
+----------------------+----------------------+-----------+---------+----------+-----------+
|   glucose blood VI   | Check blood sugar    |   100     | 12      | 20 |           |
| test strips (ONE     | before each meal and | each      |         | 12       |           |
| TOUCH ULTRA TEST)    |  as directed         |           |         |          |           |
| stripIndications:    |                      |           |         |          |           |
| Unspecified          |                      |           |         |          |           |
| hypertensive kidney  |                      |           |         |          |           |
| disease with chronic |                      |           |         |          |           |
|  kidney disease      |                      |           |         |          |           |
| stage I through      |                      |           |         |          |           |
| stage IV, or         |                      |           |         |          |           |
| unspecified(403.90), |                      |           |         |          |           |
|  Complications of    |                      |           |         |          |           |
| transplanted kidney, |                      |           |         |          |           |
|  Diabetes mellitus   |                      |           |         |          |           |
| type II,             |                      |           |         |          |           |
| uncontrolled (HCC),  |                      |           |         |          |           |
| Hyperlipidemia       |                      |           |         |          |           |
 
+----------------------+----------------------+-----------+---------+----------+-----------+
|   insulin glargine   | inject 20 units      |   10 vial | 11      | 20 |           |
| (LANTUS) 100         | subcutaneously every |           |         | 18       |           |
| units/mL injection   |  morning             |           |         |          |           |
| (vial)Indications:   |                      |           |         |          |           |
| Type 2 diabetes      |                      |           |         |          |           |
| mellitus with        |                      |           |         |          |           |
| chronic kidney       |                      |           |         |          |           |
| disease, without     |                      |           |         |          |           |
| long-term current    |                      |           |         |          |           |
| use of insulin,      |                      |           |         |          |           |
| unspecified CKD      |                      |           |         |          |           |
| stage (HCC), Kidney  |                      |           |         |          |           |
| replaced by          |                      |           |         |          |           |
| transplant           |                      |           |         |          |           |
+----------------------+----------------------+-----------+---------+----------+-----------+
|   insulin lispro     | inject               |   10 vial | 11      | 11/15/20 |           |
| (HUMALOG) 100        | subcutaneously       |           |         | 17       |           |
| units/mL injection   | BEFORE MEALS         |           |         |          |           |
| (vial)Indications:   | ACCORDING TO SLIDING |           |         |          |           |
| Type 2 diabetes      |  SCALE Max dose 20   |           |         |          |           |
| mellitus with        | units daily          |           |         |          |           |
| complication, with   |                      |           |         |          |           |
| long-term current    |                      |           |         |          |           |
| use of insulin (HCC) |                      |           |         |          |           |
+----------------------+----------------------+-----------+---------+----------+-----------+
|   levothyroxine      | take 1 tablet by     |   30      | 11      | 20 |           |
| (SYNTHROID) 50 mcg   | mouth once daily     | tablet    |         | 18       |           |
| tablet               |                      |           |         |          |           |
+----------------------+----------------------+-----------+---------+----------+-----------+
|   lisinopril         | take 1 tablet by     |   90      | 3       | 20 |           |
| (PRINIVIL,ZESTRIL)   | mouth once daily     | tablet    |         | 17       |           |
| 30 MG tablet         |                      |           |         |          |           |
+----------------------+----------------------+-----------+---------+----------+-----------+
|   loperamide         | Take 1 capsule by    |   60      | 5       | 20 |           |
| (ANTI-DIARRHEAL) 2   | mouth 4 times daily  | capsule   |         | 14       |           |
| mg                   | as needed.           |           |         |          |           |
| capsuleIndications:  |                      |           |         |          |           |
| Unspecified          |                      |           |         |          |           |
| hypertensive kidney  |                      |           |         |          |           |
| disease with chronic |                      |           |         |          |           |
|  kidney disease      |                      |           |         |          |           |
| stage I through      |                      |           |         |          |           |
| stage IV, or         |                      |           |         |          |           |
| unspecified(403.90), |                      |           |         |          |           |
|  FSGS (focal         |                      |           |         |          |           |
| segmental            |                      |           |         |          |           |
| glomerulosclerosis), |                      |           |         |          |           |
|  Hypothyroidism,     |                      |           |         |          |           |
| Hyperlipidemia       |                      |           |         |          |           |
+----------------------+----------------------+-----------+---------+----------+-----------+
|   losartan (COZAAR)  | take 1 tablet by     |   90      | 3       | 20 |           |
| 50 mg tablet         | mouth once daily     | tablet    |         | 18       |           |
+----------------------+----------------------+-----------+---------+----------+-----------+
|   magnesium oxide    | take 1 tablet by     |   60      | 11      | 10/02/20 |           |
| (MAG-OX) 400 mg      | mouth twice a day    | tablet    |         | 18       |           |
| tablet               |                      |           |         |          |           |
+----------------------+----------------------+-----------+---------+----------+-----------+
|   predniSONE         | take 1 tablet by     |   90      | 3       | 10/02/20 |           |
| (DELTASONE) 5 mg     | mouth once daily     | tablet    |         | 18       |           |
 
| tablet               |                      |           |         |          |           |
+----------------------+----------------------+-----------+---------+----------+-----------+
|   Prenatal Vit-Fe    | take 1 tablet by     |   30      | 11      | 20 |           |
| Fumarate-FA (PNV     | mouth once daily.    | tablet    |         | 18       |           |
| PRENATAL PLUS        |                      |           |         |          |           |
| MULTIVITAMIN) 27-1   |                      |           |         |          |           |
| MG TABS              |                      |           |         |          |           |
+----------------------+----------------------+-----------+---------+----------+-----------+
|   Respiratory        | Continue nocturnal   |   1 each  | 0       | 20 |           |
| Therapy Supplies     | O2 at 2 l/m through  |           |         | 18       |           |
| MISCIndications: JACK | CPAP for lifetime.   |           |         |          |           |
|  (obstructive sleep  | Dx: JACK G47.33       |           |         |          |           |
| apnea)               | Please provide new   |           |         |          |           |
|                      | nasal mask for CPAP  |           |         |          |           |
|                      | and any other needed |           |         |          |           |
|                      |  replacement         |           |         |          |           |
|                      | supplies.            |           |         |          |           |
+----------------------+----------------------+-----------+---------+----------+-----------+
|   Respiratory        | Decrease CPAP to     |   1 each  | 0       | 20 |           |
| Therapy Supplies     | 12-18 cmH2O          |           |         | 13       |           |
| MISC                 | Diagnosis            |           |         |          |           |
|                      | Code(s)327.23.       |           |         |          |           |
|                      | Please send order to |           |         |          |           |
|                      |  [facility].     |           |         |          |           |
+----------------------+----------------------+-----------+---------+----------+-----------+
|   rosuvastatin       | take 1 tablet by     |   30      | 11      | 20 |           |
| (CRESTOR) 20 mg      | mouth NIGHTLY        | tablet    |         | 18       |           |
| tablet               |                      |           |         |          |           |
+----------------------+----------------------+-----------+---------+----------+-----------+
|   fludrocortisone    | Take 1 tablet by     |           | 0       |          |  |
| (FLORINEF) 0.1 mg    | mouth Every other    |           |         |          | 9         |
| tablet               | day.                 |           |         |          |           |
+----------------------+----------------------+-----------+---------+----------+-----------+
|   metoprolol         | take 1 tablet by     |   60      | 11      | 20 |  |
| tartrate (LOPRESSOR) | mouth twice a day    | tablet    |         | 19       | 9         |
|  25 mg tablet        |                      |           |         |          |           |
+----------------------+----------------------+-----------+---------+----------+-----------+
|   mycophenolate      | Take 1 capsule by    |   60      | 11      | 20 |  |
| (CELLCEPT) 250 mg    | mouth 2 times daily. | capsule   |         | 18       | 9         |
| capsuleIndications:  |                      |           |         |          |           |
| Kidney replaced by   |                      |           |         |          |           |
| transplant           |                      |           |         |          |           |
+----------------------+----------------------+-----------+---------+----------+-----------+
|   QVAR REDIHALER 80  |                      |           | 0       | 20 |  |
| MCG/ACT inhaler      |                      |           |         | 18       | 9         |
+----------------------+----------------------+-----------+---------+----------+-----------+
|   tacrolimus         | Take 1 capsule by    |   240     | 0       | 20 |  |
| (PROGRAF) 1 mg       | mouth 2 times daily. | capsule   |         | 19       | 9         |
| capsuleIndications:  |  For a total dose of |           |         |          |           |
| Kidney replaced by   |  1 mg, by mouth, 2   |           |         |          |           |
| transplant           | times daily.         |           |         |          |           |
+----------------------+----------------------+-----------+---------+----------+-----------+
 documented as of this encounter
 
 Plan of Treatment
 
 
+--------+-----------+------------+----------------------+-------------------+
| Date   | Type      | Specialty  | Care Team            | Description       |
+--------+-----------+------------+----------------------+-------------------+
 
| / | Office    | Cardiology |   Tonia Wilson,    |                   |
|    | Visit     |            | ARNP  [address]   |                   |
|        |           |            |   [place], [place]  |                   |
|        |           |            | [id number] [phone]  |                   |
|        |           |            |  [phone] (Fax)  |                   |
+--------+-----------+------------+----------------------+-------------------+
| / | Hospital  | Radiology  |   Popeye Townsend, |                   |
|    | Encounter |            |  MD  [address] |                   |
|        |           |            |  St.  [place],   |                   |
|        |           |            | [place] [address]             |                   |
|        |           |            | [phone]         |                   |
|        |           |            | [phone] (Fax)   |                   |
+--------+-----------+------------+----------------------+-------------------+
| / | Surgery   | Radiology  |   Popeye Townsend, | CV EP PPM SYSTEM  |
|    |           |            |  MD  [address] | IMPLANT           |
|        |           |            |  [place],   |                   |
|        |           |            | [place] [address]             |                   |
|        |           |            | [phone]         |                   |
|        |           |            | [phone] (Fax)   |                   |
+--------+-----------+------------+----------------------+-------------------+
| 02/10/ | Clinical  | Cardiology |                      |                   |
|    | Support   |            |                      |                   |
+--------+-----------+------------+----------------------+-------------------+
| / | Office    | Cardiology |   Tonia Wilson,    |                   |
|    | Visit     |            | BELKIS  [address]   |                   |
|        |           |            | St  [place], [place]  |                   |
|        |           |            | [address]  [phone]  |                   |
|        |           |            |  [phone] (Fax)  |                   |
+--------+-----------+------------+----------------------+-------------------+
| / | Off-Site  | Nephrology |   Marzena Moser  |                   |
|    | Visit     |            | M, DO  [address]      |                   |
|        |           |            | [address]      |                   |
|        |           |            | [place]      |                   |
|        |           |            | [address]  [phone]  |                   |
|        |           |            |  [phone] (Fax)  |                   |
+--------+-----------+------------+----------------------+-------------------+
 documented as of this encounter
 
 Procedures
 
 
+----------------+--------+-------------+----------------------+----------------------+
| Procedure Name | Priori | Date/Time   | Associated Diagnosis | Comments             |
|                | ty     |             |                      |                      |
+----------------+--------+-------------+----------------------+----------------------+
| ECHO COMPLETE  | Routin | 10/30/2019  |   Coronary artery    |   Results for this   |
|                | e      |  5:07 PM    | disease involving    | procedure are in the |
|                |        | PDT         | native coronary      |  results section.    |
|                |        |             | artery of native     |                      |
|                |        |             | heart without angina |                      |
|                |        |             |  pectoris            |                      |
|                |        |             | Hypertension,        |                      |
|                |        |             | essential  Mixed     |                      |
|                |        |             | hyperlipidemia  PVC  |                      |
|                |        |             | (premature           |                      |
|                |        |             | ventricular          |                      |
|                |        |             | contraction)         |                      |
+----------------+--------+-------------+----------------------+----------------------+
 documented in this encounter
 
 
 Results
 ECHO Complete (10/30/2019  5:07 PM PDT)
 
+-------------+---------+-----------+-------------+--------------+
| Component   | Value   | Ref Range | Performed   | Pathologist  |
|             |         |           | At          | Signature    |
+-------------+---------+-----------+-------------+--------------+
| Patient     | 262 lbs |           | [facility] |              |
| Weight      |         |           |             |              |
| (lbs)       |         |           |             |              |
+-------------+---------+-----------+-------------+--------------+
| Patient     | 5'6     |           | [facility] |              |
| Height      |         |           |             |              |
+-------------+---------+-----------+-------------+--------------+
| LVIDd       | 4.13    | cm        | [facility] |              |
+-------------+---------+-----------+-------------+--------------+
| FS          | 28      | %         | [facility] |              |
+-------------+---------+-----------+-------------+--------------+
| LA volume   | 94.38   | mL        | [facility] |              |
+-------------+---------+-----------+-------------+--------------+
| Ascending   | 3.33    | cm        | [facility] |              |
| aorta       |         |           |             |              |
+-------------+---------+-----------+-------------+--------------+
| Aortic arch | 2.24    | cm        | [facility] |              |
+-------------+---------+-----------+-------------+--------------+
| IVRT        | 107.27  | msec      | [facility] |              |
+-------------+---------+-----------+-------------+--------------+
| LVOT peak   | 77.14   | cm/s      | [facility] |              |
| travon         |         |           |             |              |
+-------------+---------+-----------+-------------+--------------+
| AV peak travon | 139.85  | cm/s      | [facility] |              |
+-------------+---------+-----------+-------------+--------------+
| AV peak     | 7.82    | mmHg      | [facility] |              |
| gradient    |         |           |             |              |
+-------------+---------+-----------+-------------+--------------+
| LA Volume   | 42      | mL/m2     | [facility] |              |
| Index       |         |           |             |              |
+-------------+---------+-----------+-------------+--------------+
| AV LVOT     | 2.38    | mmHg      | [facility] |              |
| Peak        |         |           |             |              |
| Gradient    |         |           |             |              |
+-------------+---------+-----------+-------------+--------------+
| TR Peak     | 18      | mmHg      | [facility] |              |
| Gradient    |         |           |             |              |
+-------------+---------+-----------+-------------+--------------+
| TR Velocity | 214.5   | cm        | [facility] |              |
+-------------+---------+-----------+-------------+--------------+
| LV          | 7.19    | cm        | [facility] |              |
| Diastolic   |         |           |             |              |
| Length 4C   |         |           |             |              |
+-------------+---------+-----------+-------------+--------------+
| LV          | 50      | %         | [facility] |              |
| Moran's   |         |           |             |              |
| Biplane EF  |         |           |             |              |
+-------------+---------+-----------+-------------+--------------+
| LV ED       | 45.92   | ml        | [facility] |              |
| Volume      |         |           |             |              |
| (Moran's) |         |           |             |              |
+-------------+---------+-----------+-------------+--------------+
| LV ED       | 20      | ml/m2     | [facility] |              |
 
| Volume      |         |           |             |              |
| Index       |         |           |             |              |
+-------------+---------+-----------+-------------+--------------+
| LV ES       | 23.42   | ml        | [facility] |              |
| Volume      |         |           |             |              |
+-------------+---------+-----------+-------------+--------------+
| MV          | 197.48  | msec      | [facility] |              |
| Deceleratio |         |           |             |              |
| n Time      |         |           |             |              |
+-------------+---------+-----------+-------------+--------------+
| MV Peak     | 81.53   | cm/s      | [facility] |              |
| E-Wave      |         |           |             |              |
+-------------+---------+-----------+-------------+--------------+
| LA/Aorta    | 1.19    |           | [facility] |              |
| Ratio       |         |           |             |              |
+-------------+---------+-----------+-------------+--------------+
| LA Area     | 27.42   | cm2       | [facility] |              |
+-------------+---------+-----------+-------------+--------------+
| LV ES       | 10      | ml/m2     | [facility] |              |
| Volume      |         |           |             |              |
| Index       |         |           |             |              |
+-------------+---------+-----------+-------------+--------------+
| Aortic Root | 3.7     | cm        | [facility] |              |
|  Diameter   |         |           |             |              |
+-------------+---------+-----------+-------------+--------------+
| IVS         | 1.15    | cm        | [facility] |              |
| Diastolic   |         |           |             |              |
| Thickness   |         |           |             |              |
| MM          |         |           |             |              |
+-------------+---------+-----------+-------------+--------------+
| LVPW        | 1.21    | cm        | [facility] |              |
| Diastolic   |         |           |             |              |
| Thickness   |         |           |             |              |
| MM          |         |           |             |              |
+-------------+---------+-----------+-------------+--------------+
| IVS         | 1.29    | cm        | [facility] |              |
| Systolic    |         |           |             |              |
| Thickness   |         |           |             |              |
| MM          |         |           |             |              |
+-------------+---------+-----------+-------------+--------------+
| LV Systolic | 2.97    | cm        | [facility] |              |
|  Diameter   |         |           |             |              |
| MM          |         |           |             |              |
+-------------+---------+-----------+-------------+--------------+
| LVPW        | 1.65    | cm        | [facility] |              |
| Systolic    |         |           |             |              |
| Thickness   |         |           |             |              |
| MM          |         |           |             |              |
+-------------+---------+-----------+-------------+--------------+
| AV Cusp     | 1.61    | cm        | [facility] |              |
| Seperation  |         |           |             |              |
| MM          |         |           |             |              |
+-------------+---------+-----------+-------------+--------------+
| LA Systolic | 4.4     | cm        | [facility] |              |
|  Diameter   |         |           |             |              |
| MM          |         |           |             |              |
+-------------+---------+-----------+-------------+--------------+
| LVEF-TTE    | 55      | %         | [facility] |              |
| TRANSTHORAC |         |           |             |              |
| IC ECHO     |         |           |             |              |
 
+-------------+---------+-----------+-------------+--------------+
| RA PRESSURE | 8       | mmHg      | [facility] |              |
+-------------+---------+-----------+-------------+--------------+
| RVSP        | 26      | mmHg      | [facility] |              |
| Estimated   |         |           |             |              |
+-------------+---------+-----------+-------------+--------------+
 
 
 
+----------+
| Specimen |
+----------+
|          |
+----------+
 
 
 
+------------------------------------------------------------------------+---------------+
| Narrative                                                              | Performed At  |
+------------------------------------------------------------------------+---------------+
|   This result has an attachment that is not available.  1.   Mild      |   [facility] |
| biatrial dilatation.2.   Normal left ventricular size with a mild      |               |
| concentric left ventricular hypertrophy.   Left ventricular systolic   |               |
| function is preserved.   LVEF is 55-60%.3.   Mildly thickened and      |               |
| calcified trileaflet aortic valve with adequate opening.   There is    |               |
| aortic valve sclerosis without significant aortic valve stenosis.4.    |               |
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
| change.   There is a mild mitral valve regurgitation.5.   Mild mitral  |               |
| annular calcification.6.   Mild tricuspid valve regurgitation.7.       |               |
|   Normal right-sided pressure.   8.   Dilated IVC with a normal        |               |
| respiratory collapse.9.   When compared to echocardiography on         |               |
| 2018, no significant changes.                                     |               |
|8.   Dilated IVC with a normal respiratory collapse.                    |               |
|9.   When compared to echocardiography on 2018, no significant    |               |
|changes.                                                               |               |
+------------------------------------------------------------------------+---------------+
 
 
 
+---------------+---------+--------------------+--------------+
| Performing    | Address | City/State/Zipcode | Phone Number |
| Organization  |         |                    |              |
+---------------+---------+--------------------+--------------+
|   [facility] |         |                    |              |
+---------------+---------+--------------------+--------------+
 documented in this encounter
 
 Visit Diagnoses
 
 
+-------------------------------------------------------------------------------------+
| Diagnosis                                                                           |
+-------------------------------------------------------------------------------------+
|   Coronary artery disease involving native coronary artery of native heart without  |
| angina pectoris                                                                     |
+-------------------------------------------------------------------------------------+
|   Hypertension, essential  Unspecified essential hypertension                       |
+-------------------------------------------------------------------------------------+
|   Mixed hyperlipidemia                                                              |
+-------------------------------------------------------------------------------------+
 
|   PVC (premature ventricular contraction)  Other premature beats                    |
+-------------------------------------------------------------------------------------+
 documented in this encounter

## 2020-01-08 NOTE — XMS
Encounter Summary
  Created on: 2020
 
 Viviana Ricci
 External Reference #: [id number]
 : 47
 Sex: Female
 
 Demographics
 
 
+-----------------------+----------------------+
| Address               | [address]      |
|                       | [place]  [address] |
+-----------------------+----------------------+
| Home Phone            | [phone]      |
+-----------------------+----------------------+
| Preferred Language    | Unknown              |
+-----------------------+----------------------+
| Marital Status        | Single               |
+-----------------------+----------------------+
| Baptism Affiliation | 1009                 |
+-----------------------+----------------------+
| Race                  | Unknown              |
+-----------------------+----------------------+
| Ethnic Group          | Unknown              |
+-----------------------+----------------------+
 
 
 Author
 
 
+--------------+--------------------------------------------+
| Author       | [facility]  |
|              | and [facility]                                |
+--------------+--------------------------------------------+
| Organization | [facility]  |
|              | and [facility]                                |
+--------------+--------------------------------------------+
| Address      | Unknown                                    |
+--------------+--------------------------------------------+
| Phone        | Unavailable                                |
+--------------+--------------------------------------------+
 
 
 
 Support
 
 
+----------------+--------------+---------------------+-----------------+
| Name           | Relationship | Address             | Phone           |
+----------------+--------------+---------------------+-----------------+
| Ada/Ed Radhames | ECON         | [address]              | [phone] |
|                |              | [place]  |                 |
|                |              |  [address]              |                 |
+----------------+--------------+---------------------+-----------------+
 
 
 
 
 Care Team Providers
 
 
+------------------------+------+-----------------+
| Care Team Member Name  | Role | Phone           |
+------------------------+------+-----------------+
| Marzena Moser DO | PCP  | [phone] |
+------------------------+------+-----------------+
 
 
 
 Reason for Visit
 
 
+-------------------+----------+
| Reason            | Comments |
+-------------------+----------+
| Medication Refill |          |
+-------------------+----------+
 
 
 
 Encounter Details
 
 
+--------+--------+---------------------+----------------------+-------------------+
| Date   | Type   | Department          | Care Team            | Description       |
+--------+--------+---------------------+----------------------+-------------------+
| 11/15/ | Refill |   [facility]         |   Marzena Moser  | Medication Refill |
| 2018   |        | NEPHROLOGY  [address]   | M, DO  [address]      |                   |
|        |        | [address]   | [address]      |                   |
|        |        | [place], [place]     | [place], [place]      |                   |
|        |        | [address]          | [address]  [phone]  |                   |
|        |        | [phone]        |  [phone] (Fax)  |                   |
+--------+--------+---------------------+----------------------+-------------------+
 
 
 
 Social History
 
 
+--------------+-------+-----------+--------+------+
| Tobacco Use  | Types | Packs/Day | Years  | Date |
|              |       |           | Used   |      |
+--------------+-------+-----------+--------+------+
| Never Smoker |       |           |        |      |
+--------------+-------+-----------+--------+------+
 
 
 
+---------------------+---+---+---+
| Smokeless Tobacco:  |   |   |   |
| Never Used          |   |   |   |
+---------------------+---+---+---+
 
 
 
+---------------------------------------------------------------+
| Comments: some second hand smoke exposure, but fairly minimal |
 
+---------------------------------------------------------------+
 
 
 
+-------------+-------------+---------+----------+
| Alcohol Use | Drinks/Week | oz/Week | Comments |
+-------------+-------------+---------+----------+
| No          |             |         |          |
+-------------+-------------+---------+----------+
 
 
 
+------------------+---------------+
| Sex Assigned at  | Date Recorded |
| Birth            |               |
+------------------+---------------+
| Not on file      |               |
+------------------+---------------+
 
 
 
+----------------+-------------+-------------+
| Job Start Date | Occupation  | Industry    |
+----------------+-------------+-------------+
| Not on file    | Not on file | Not on file |
+----------------+-------------+-------------+
 
 
 
+----------------+--------------+------------+
| Travel History | Travel Start | Travel End |
+----------------+--------------+------------+
 
 
 
+-------------------------------------+
| No recent travel history available. |
+-------------------------------------+
 documented as of this encounter
 
 Plan of Treatment
 
 
+--------+-----------+------------+----------------------+-------------------+
| Date   | Type      | Specialty  | Care Team            | Description       |
+--------+-----------+------------+----------------------+-------------------+
| / | Office    | Cardiology |   Tonia Wilson,    |                   |
|    | Visit     |            | ARNP  [address]   |                   |
|        |           |            | St [place], [place]  |                   |
|        |           |            | [address]  [phone]  |                   |
|        |           |            |  [phone] (Fax)  |                   |
+--------+-----------+------------+----------------------+-------------------+
| / | Hospital  | Radiology  |   Popeye Townsend, |                   |
|    | Encounter |            |  MD  [address] |                   |
|        |           |            |  [place],   |                   |
|        |           |            | [place] [address]             |                   |
|        |           |            | [phone]         |                   |
|        |           |            | [phone] (Fax)   |                   |
+--------+-----------+------------+----------------------+-------------------+
| / | Surgery   | Radiology  |   Popeye Townsend, | CV EP PPM SYSTEM  |
 
|    |           |            |  MD  [address] | IMPLANT           |
|        |           |            |  [place],   |                   |
|        |           |            | [place] [address]             |                   |
|        |           |            | [phone]         |                   |
|        |           |            | [phone] (Fax)   |                   |
+--------+-----------+------------+----------------------+-------------------+
| 02/10/ | Clinical  | Cardiology |                      |                   |
|    | Support   |            |                      |                   |
+--------+-----------+------------+----------------------+-------------------+
| / | Office    | Cardiology |   Tonia Wilson,    |                   |
|    | Visit     |            | ARNP  [address]   |                   |
|        |           |            | [place]  |                   |
|        |           |            | [id number] [phone]  |                   |
|        |           |            |  [phone] (Fax)  |                   |
+--------+-----------+------------+----------------------+-------------------+
| / | Off-Site  | Nephrology |   Marzena Moser  |                   |
|    | Visit     |            | DO ETIENNE  [address] [place] [address]      |                   |
|        |           |            | [address]      |                   |
|        |           |            | [place]      |                   |
|        |           |            | [address] [phone]  |                   |
|        |           |            |  [phone] (Fax)  |                   |
+--------+-----------+------------+----------------------+-------------------+
 documented as of this encounter
 
 Visit Diagnoses
 
 
+-------------------------------------------+
| Diagnosis                                 |
+-------------------------------------------+
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
+-------------------------------------------+
 documented in this encounter

## 2020-01-08 NOTE — XMS
Encounter Summary
  Created on: 2020
 
 Viviana Ricci
 External Reference #: [id number]
 : 47
 Sex: Female
 
 Demographics
 
 
+-----------------------+----------------------+
| Address               | [address]      |
|                       | [place]  [address] |
+-----------------------+----------------------+
| Home Phone            | [phone]      |
+-----------------------+----------------------+
| Preferred Language    | Unknown              |
+-----------------------+----------------------+
| Marital Status        | Single               |
+-----------------------+----------------------+
| Orthodox Affiliation | 1009                 |
+-----------------------+----------------------+
| Race                  | Unknown              |
+-----------------------+----------------------+
| Ethnic Group          | Unknown              |
+-----------------------+----------------------+
 
 
 Author
 
 
+--------------+--------------------------------------------+
| Author       | [facility]  |
|              | and [facility]                                |
+--------------+--------------------------------------------+
| Organization | [facility]  |
|              | and [facility]                                |
+--------------+--------------------------------------------+
| Address      | Unknown                                    |
+--------------+--------------------------------------------+
| Phone        | Unavailable                                |
+--------------+--------------------------------------------+
 
 
 
 Support
 
 
+----------------+--------------+---------------------+-----------------+
| Name           | Relationship | Address             | Phone           |
+----------------+--------------+---------------------+-----------------+
| Ada/Ed Radhames | ECON         | [address]              | [phone] |
|                |              | [place]  |                 |
|                |              |  [address]              |                 |
+----------------+--------------+---------------------+-----------------+
 
 
 
 
 Care Team Providers
 
 
+-----------------------+------+-------------+
| Care Team Member Name | Role | Phone       |
+-----------------------+------+-------------+
 PCP  | Unavailable |
+-----------------------+------+-------------+
 
 
 
 Encounter Details
 
 
+--------+-----------+----------------------+----------------------+-------------+
| Date   | Type      | Department           | Care Team            | Description |
+--------+-----------+----------------------+----------------------+-------------+
| / | [facility]  |   [facility] |   Marzena Moser  |             |
|    | Encounter |  MED CTR XRAY  [address] | M, DO  [address]      |             |
|        |           |  [place]       | [address]      |             |
|        |           | [place] [address] | [place]      |             |
|        |           |   [phone]       | [address] [phone]  |             |
|        |           |                      |  [phone] (Fax)  |             |
+--------+-----------+----------------------+----------------------+-------------+
 
 
 
 Social History
 
 
+----------------+-------+-----------+--------+------+
| Tobacco Use    | Types | Packs/Day | Years  | Date |
|                |       |           | Used   |      |
+----------------+-------+-----------+--------+------+
| Never Assessed |       |           |        |      |
+----------------+-------+-----------+--------+------+
 
 
 
+------------------+---------------+
| Sex Assigned at  | Date Recorded |
| Birth            |               |
+------------------+---------------+
| Not on file      |               |
+------------------+---------------+
 
 
 
+----------------+-------------+-------------+
| Job Start Date | Occupation  | Industry    |
+----------------+-------------+-------------+
| Not on file    | Not on file | Not on file |
+----------------+-------------+-------------+
 
 
 
+----------------+--------------+------------+
| Travel History | Travel Start | Travel End |
+----------------+--------------+------------+
 
 
 
 
+-------------------------------------+
| No recent travel history available. |
+-------------------------------------+
 documented as of this encounter
 
 Plan of Treatment
 
 
+--------+-----------+------------+----------------------+-------------------+
| Date   | Type      | Specialty  | Care Team            | Description       |
+--------+-----------+------------+----------------------+-------------------+
| / | Office    | Cardiology |   Tonia Wilson,    |                   |
|    | Visit     |            | ARNJESSICA  [address]   |                   |
|        |           |            | St  [place], [place]  |                   |
|        |           |            | [address]  [phone]  |                   |
|        |           |            |  [phone] (Fax)  |                   |
+--------+-----------+------------+----------------------+-------------------+
| / | Hospital  | Radiology  |   Popeye Townsend, |                   |
|    | Encounter |            |  MD  [address] |                   |
|        |           |            |  St[place],   |                   |
|        |           |            | [place] [address]             |                   |
|        |           |            | [phone]         |                   |
|        |           |            | [phone] (Fax)   |                   |
+--------+-----------+------------+----------------------+-------------------+
| / | Surgery   | Radiology  |   Popeye Townsend, | CV EP PPM SYSTEM  |
|    |           |            |  MD  [address] | IMPLANT           |
|        |           |            |  [place],   |                   |
|        |           |            | [place] [address]             |                   |
|        |           |            | [phone]         |                   |
|        |           |            | [phone] (Fax)   |                   |
+--------+-----------+------------+----------------------+-------------------+
| 02/10/ | Clinical  | Cardiology |                      |                   |
|    | Support   |            |                      |                   |
+--------+-----------+------------+----------------------+-------------------+
| / | Office    | Cardiology |   Tonia Wilson,    |                   |
|    | Visit     |            | BELKIS  [address]   |                   |
|        |           |            | [place]  |                   |
|        |           |            | [address]  [phone]  |                   |
|        |           |            |  [phone] (Fax)  |                   |
+--------+-----------+------------+----------------------+-------------------+
| / | Off-Site  | Nephrology |   Marzena Moser  |                   |
|    | Visit     |            | DO [place]TIENNE  [address] [place] [address]      |                   |
|        |           |            | [address]      |                   |
|        |           |            | [place]      |                   |
|        |           |            | [address] [phone]  |                   |
|        |           |            |  [phone] (Fax)  |                   |
+--------+-----------+------------+----------------------+-------------------+
 documented as of this encounter
 
 Visit Diagnoses
 Not on filedocumented in this encounter

## 2020-01-08 NOTE — XMS
Encounter Summary
  Created on: 2020
 
 Viviana Ricci
 External Reference #: [id number]
 : 47
 Sex: Female
 
 Demographics
 
 
+-----------------------+----------------------+
| Address               | [address]      |
|                       | [place]  [address] |
+-----------------------+----------------------+
| Home Phone            | [phone]      |
+-----------------------+----------------------+
| Preferred Language    | Unknown              |
+-----------------------+----------------------+
| Marital Status        | Single               |
+-----------------------+----------------------+
| Sikhism Affiliation | [id number]                 |
+-----------------------+----------------------+
| Race                  | Unknown              |
+-----------------------+----------------------+
| Ethnic Group          | Unknown              |
+-----------------------+----------------------+
 
 
 Author
 
 
+--------------+--------------------------------------------+
| Author       | [facility]  |
|              | and [place]                                |
+--------------+--------------------------------------------+
| Organization | [facility]  |
|              | and [place]                                |
+--------------+--------------------------------------------+
| Address      | Unknown                                    |
+--------------+--------------------------------------------+
| Phone        | Unavailable                                |
+--------------+--------------------------------------------+
 
 
 
 Support
 
 
+----------------+--------------+---------------------+-----------------+
| Name           | Relationship | Address             | Phone           |
+----------------+--------------+---------------------+-----------------+
| Ada/Ed Radhames | ECON         | [address]              | [phone] |
|                |              | [place]  |                 |
|                |              |  [address]              |                 |
+----------------+--------------+---------------------+-----------------+
 
 
 
 
 Care Team Providers
 
 
+------------------------+------+-----------------+
| Care Team Member Name  | Role | Phone           |
+------------------------+------+-----------------+
| Marzena Moser DO | PCP  | [phone] |
+------------------------+------+-----------------+
 
 
 
 Reason for Visit
 
 
+-------------------+----------+
| Reason            | Comments |
+-------------------+----------+
| Medication Refill |          |
+-------------------+----------+
 
 
 
 Encounter Details
 
 
+--------+--------+---------------------+----------------------+-------------------+
| Date   | Type   | Department          | Care Team            | Description       |
+--------+--------+---------------------+----------------------+-------------------+
| 11/15/ | Refill |   [facility]         |   Marzena Moser  | Medication Refill |
| 2018   |        | NEPHROLOGY  [address]   | M, DO  [address]      |                   |
|        |        | [address]   | [address]      |                   |
|        |        | [place], [place]     | [place], [place]      |                   |
|        |        | [address]          | [address]  [phone]  |                   |
|        |        | [phone]        |  [phone] (Fax)  |                   |
+--------+--------+---------------------+----------------------+-------------------+
 
 
 
 Social History
 
 
+--------------+-------+-----------+--------+------+
| Tobacco Use  | Types | Packs/Day | Years  | Date |
|              |       |           | Used   |      |
+--------------+-------+-----------+--------+------+
| Never Smoker |       |           |        |      |
+--------------+-------+-----------+--------+------+
 
 
 
+---------------------+---+---+---+
| Smokeless Tobacco:  |   |   |   |
| Never Used          |   |   |   |
+---------------------+---+---+---+
 
 
 
+---------------------------------------------------------------+
| Comments: some second hand smoke exposure, but fairly minimal |
 
+---------------------------------------------------------------+
 
 
 
+-------------+-------------+---------+----------+
| Alcohol Use | Drinks/Week | oz/Week | Comments |
+-------------+-------------+---------+----------+
| No          |             |         |          |
+-------------+-------------+---------+----------+
 
 
 
+------------------+---------------+
| Sex Assigned at  | Date Recorded |
| Birth            |               |
+------------------+---------------+
| Not on file      |               |
+------------------+---------------+
 
 
 
+----------------+-------------+-------------+
| Job Start Date | Occupation  | Industry    |
+----------------+-------------+-------------+
| Not on file    | Not on file | Not on file |
+----------------+-------------+-------------+
 
 
 
+----------------+--------------+------------+
| Travel History | Travel Start | Travel End |
+----------------+--------------+------------+
 
 
 
+-------------------------------------+
| No recent travel history available. |
+-------------------------------------+
 documented as of this encounter
 
 Plan of Treatment
 
 
+--------+-----------+------------+----------------------+-------------------+
| Date   | Type      | Specialty  | Care Team            | Description       |
+--------+-----------+------------+----------------------+-------------------+
| / | Office    | Cardiology |   Tonia Wilson,    |                   |
|    | Visit     |            | ARNP  [address]   |                   |
|        |           |            | St [place], [place]  |                   |
|        |           |            | [address]  [phone]  |                   |
|        |           |            |  [phone] (Fax)  |                   |
+--------+-----------+------------+----------------------+-------------------+
| / | Hospital  | Radiology  |   Popeye Townsend, |                   |
|    | Encounter |            |  MD  [address] |                   |
|        |           |            |  [place],   |                   |
|        |           |            | [place] [address]             |                   |
|        |           |            | [phone]         |                   |
|        |           |            | [phone] (Fax)   |                   |
+--------+-----------+------------+----------------------+-------------------+
| / | Surgery   | Radiology  |   Popeye Townsend, | CV EP PPM SYSTEM  |
 
|    |           |            |  MD  [address] | IMPLANT           |
|        |           |            |  [place],   |                   |
|        |           |            | [place] [address]             |                   |
|        |           |            | [phone]         |                   |
|        |           |            | [phone] (Fax)   |                   |
+--------+-----------+------------+----------------------+-------------------+
| 02/10/ | Clinical  | Cardiology |                      |                   |
|    | Support   |            |                      |                   |
+--------+-----------+------------+----------------------+-------------------+
| / | Office    | Cardiology |   Tonia Wilson,    |                   |
|    | Visit     |            | ARNP  [address]   |                   |
|        |           |            | [place]  |                   |
|        |           |            | [id number] [phone]  |                   |
|        |           |            |  [phone] (Fax)  |                   |
+--------+-----------+------------+----------------------+-------------------+
| / | Off-Site  | Nephrology |   Marzena Moser  |                   |
|    | Visit     |            | DO ETIENNE  [address] [place] [address]      |                   |
|        |           |            | [address]      |                   |
|        |           |            | [place]      |                   |
|        |           |            | [address] [phone]  |                   |
|        |           |            |  [phone] (Fax)  |                   |
+--------+-----------+------------+----------------------+-------------------+
 documented as of this encounter
 
 Visit Diagnoses
 
 
+-------------------------------------------+
| Diagnosis                                 |
+-------------------------------------------+
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
+-------------------------------------------+
 documented in this encounter

## 2020-01-08 NOTE — XMS
Encounter Summary
  Created on: 2020
 
 Viviana Ricci
 External Reference #: [id number]
 : 47
 Sex: Female
 
 Demographics
 
 
+-----------------------+----------------------+
| Address               | [address]      |
|                       | [place]  [address] |
+-----------------------+----------------------+
| Home Phone            | [phone]      |
+-----------------------+----------------------+
| Preferred Language    | Unknown              |
+-----------------------+----------------------+
| Marital Status        | Single               |
+-----------------------+----------------------+
| Roman Catholic Affiliation | 1009                 |
+-----------------------+----------------------+
| Race                  | Unknown              |
+-----------------------+----------------------+
| Ethnic Group          | Unknown              |
+-----------------------+----------------------+
 
 
 Author
 
 
+--------------+--------------------------------------------+
| Author       | [facility]  |
|              | and [place]                                |
+--------------+--------------------------------------------+
| Organization | [facility]  |
|              | and [place]                                |
+--------------+--------------------------------------------+
| Address      | Unknown                                    |
+--------------+--------------------------------------------+
| Phone        | Unavailable                                |
+--------------+--------------------------------------------+
 
 
 
 Support
 
 
+----------------+--------------+---------------------+-----------------+
| Name           | Relationship | Address             | Phone           |
+----------------+--------------+---------------------+-----------------+
| Ada/Ed Radhames | ECON         | [address]              | [phone] |
|                |              | [place]  |                 |
|                |              |  [address]              |                 |
+----------------+--------------+---------------------+-----------------+
 
 
 
 
 Care Team Providers
 
 
+-----------------------+------+-------------+
| Care Team Member Name | Role | Phone       |
+-----------------------+------+-------------+
 PCP  | Unavailable |
+-----------------------+------+-------------+
 
 
 
 Reason for Visit
 
 
+-------------------+----------+
| Reason            | Comments |
+-------------------+----------+
| Medication Refill |          |
+-------------------+----------+
 
 
 
 Encounter Details
 
 
+--------+--------+---------------------+----------------------+-------------------+
| Date   | Type   | Department          | Care Team            | Description       |
+--------+--------+---------------------+----------------------+-------------------+
| / | Refill |   [facility]         |   Marzena Moser  | Medication Refill |
|    |        | NEPHROLOGY  [address]   | M, DO  [address]      |                   |
|        |        | [address]   | [address]      |                   |
|        |        | [place], [place]     | [place], [place]      |                   |
|        |        | [address]          | [address]  [phone]  |                   |
|        |        | [phone]        |  [phone] (Fax)  |                   |
+--------+--------+---------------------+----------------------+-------------------+
 
 
 
 Social History
 
 
+--------------+-------+-----------+--------+------+
| Tobacco Use  | Types | Packs/Day | Years  | Date |
|              |       |           | Used   |      |
+--------------+-------+-----------+--------+------+
| Never Smoker |       |           |        |      |
+--------------+-------+-----------+--------+------+
 
 
 
+---------------------+---+---+---+
| Smokeless Tobacco:  |   |   |   |
| Never Used          |   |   |   |
+---------------------+---+---+---+
 
 
 
+---------------------------------------------------------------+
| Comments: some second hand smoke exposure, but fairly minimal |
 
+---------------------------------------------------------------+
 
 
 
+-------------+-------------+---------+----------+
| Alcohol Use | Drinks/Week | oz/Week | Comments |
+-------------+-------------+---------+----------+
| No          |             |         |          |
+-------------+-------------+---------+----------+
 
 
 
+------------------+---------------+
| Sex Assigned at  | Date Recorded |
| Birth            |               |
+------------------+---------------+
| Not on file      |               |
+------------------+---------------+
 
 
 
+----------------+-------------+-------------+
| Job Start Date | Occupation  | Industry    |
+----------------+-------------+-------------+
| Not on file    | Not on file | Not on file |
+----------------+-------------+-------------+
 
 
 
+----------------+--------------+------------+
| Travel History | Travel Start | Travel End |
+----------------+--------------+------------+
 
 
 
+-------------------------------------+
| No recent travel history available. |
+-------------------------------------+
 documented as of this encounter
 
 Plan of Treatment
 
 
+--------+-----------+------------+----------------------+-------------------+
| Date   | Type      | Specialty  | Care Team            | Description       |
+--------+-----------+------------+----------------------+-------------------+
| / | Office    | Cardiology |   HellalfredoTonia,    |                   |
|    | Visit     |            | ARNP  [address]   |                   |
|        |           |            | St  [place], [place]  |                   |
|        |           |            | [address]  [phone]  |                   |
|        |           |            |  [phone] (Fax)  |                   |
+--------+-----------+------------+----------------------+-------------------+
| / | Hospital  | Radiology  |   Popeye Townsend, |                   |
|    | Encounter |            |  MD  [address] |                   |
|        |           |            |  St.  [place],   |                   |
|        |           |            | [place] [address]             |                   |
|        |           |            | [phone]         |                   |
|        |           |            | [phone] (Fax)   |                   |
+--------+-----------+------------+----------------------+-------------------+
| / | Surgery   | Radiology  |   Popeye Townsend, | CV EP PPM SYSTEM  |
 
|    |           |            |  MD  [address] | IMPLANT           |
|        |           |            |  St.  [place],   |                   |
|        |           |            | [place] [address]             |                   |
|        |           |            | [phone]         |                   |
|        |           |            | [phone] (Fax)   |                   |
+--------+-----------+------------+----------------------+-------------------+
| 02/10/ | Clinical  | Cardiology |                      |                   |
|    | Support   |            |                      |                   |
+--------+-----------+------------+----------------------+-------------------+
| / | Office    | Cardiology |   Tonia Wilson,    |                   |
|    | Visit     |            | ARNP  [address]   |                   |
|        |           |            | [place]  |                   |
|        |           |            | [address]  [phone]  |                   |
|        |           |            |  [phone] (Fax)  |                   |
+--------+-----------+------------+----------------------+-------------------+
| / | Off-Site  | Nephrology |   Marzena Moser  |                   |
|    | Visit     |            | DO ETIENNE  [address] [place] [address]      |                   |
|        |           |            | [address]      |                   |
|        |           |            | [place]      |                   |
|        |           |            | [address]  [phone]  |                   |
|        |           |            |  [phone] (Fax)  |                   |
+--------+-----------+------------+----------------------+-------------------+
 documented as of this encounter
 
 Visit Diagnoses
 Not on filedocumented in this encounter

## 2020-01-08 NOTE — XMS
Encounter Summary
  Created on: 2020
 
 Viviana Ricci
 External Reference #: [id number]
 : 47
 Sex: Female
 
 Demographics
 
 
+-----------------------+----------------------+
| Address               | [address]      |
|                       | [place]  [address] |
+-----------------------+----------------------+
| Home Phone            | [phone]      |
+-----------------------+----------------------+
| Preferred Language    | Unknown              |
+-----------------------+----------------------+
| Marital Status        | Single               |
+-----------------------+----------------------+
| Religion Affiliation | 1009                 |
+-----------------------+----------------------+
| Race                  | Unknown              |
+-----------------------+----------------------+
| Ethnic Group          | Unknown              |
+-----------------------+----------------------+
 
 
 Author
 
 
+--------------+--------------------------------------------+
| Author       | [facility]  |
|              | and [facility]                                |
+--------------+--------------------------------------------+
| Organization | [facility]  |
|              | and [facility]                                |
+--------------+--------------------------------------------+
| Address      | Unknown                                    |
+--------------+--------------------------------------------+
| Phone        | Unavailable                                |
+--------------+--------------------------------------------+
 
 
 
 Support
 
 
+----------------+--------------+---------------------+-----------------+
| Name           | Relationship | Address             | Phone           |
+----------------+--------------+---------------------+-----------------+
| Ada/Ed Radhames | ECON         | [address]              | [phone] |
|                |              | [place]  |                 |
|                |              |  [address]              |                 |
+----------------+--------------+---------------------+-----------------+
 
 
 
 
 Care Team Providers
 
 
+-----------------------+------+-------------+
| Care Team Member Name | Role | Phone       |
+-----------------------+------+-------------+
 PCP  | Unavailable |
+-----------------------+------+-------------+
 
 
 
 Encounter Details
 
 
+--------+----------+---------------------+----------------------+-------------+
| Date   | Type     | Department          | Care Team            | Description |
+--------+----------+---------------------+----------------------+-------------+
| / | Abstract |   [facility]         |   Marzena Moser  |             |
|    |          | NEPHROLOGY  [address]   | M, DO  [address]      |             |
|        |          | [address]   | [address]      |             |
|        |          | [place], [place]     | [place]      |             |
|        |          | [address]          | [address]  [phone]  |             |
|        |          | [phone]        |  [phone] (Fax)  |             |
+--------+----------+---------------------+----------------------+-------------+
 
 
 
 Social History
 
 
+--------------+-------+-----------+--------+------+
| Tobacco Use  | Types | Packs/Day | Years  | Date |
|              |       |           | Used   |      |
+--------------+-------+-----------+--------+------+
| Never Smoker |       |           |        |      |
+--------------+-------+-----------+--------+------+
 
 
 
+---------------------+---+---+---+
| Smokeless Tobacco:  |   |   |   |
| Never Used          |   |   |   |
+---------------------+---+---+---+
 
 
 
+---------------------------------------------------------------+
| Comments: some second hand smoke exposure, but fairly minimal |
+---------------------------------------------------------------+
 
 
 
+-------------+-------------+---------+----------+
| Alcohol Use | Drinks/Week | oz/Week | Comments |
+-------------+-------------+---------+----------+
| No          |             |         |          |
+-------------+-------------+---------+----------+
 
 
 
 
+------------------+---------------+
| Sex Assigned at  | Date Recorded |
| Birth            |               |
+------------------+---------------+
| Not on file      |               |
+------------------+---------------+
 
 
 
+----------------+-------------+-------------+
| Job Start Date | Occupation  | Industry    |
+----------------+-------------+-------------+
| Not on file    | Not on file | Not on file |
+----------------+-------------+-------------+
 
 
 
+----------------+--------------+------------+
| Travel History | Travel Start | Travel End |
+----------------+--------------+------------+
 
 
 
+-------------------------------------+
| No recent travel history available. |
+-------------------------------------+
 documented as of this encounter
 
 Plan of Treatment
 
 
+--------+-----------+------------+----------------------+-------------------+
| Date   | Type      | Specialty  | Care Team            | Description       |
+--------+-----------+------------+----------------------+-------------------+
| / | Office    | Cardiology |   Tonia Wilson,    |                   |
|    | Visit     |            | ARNJESSICA  [address]   |                   |
|        |           |            | [place]  |                   |
|        |           |            | [address]  [phone]  |                   |
|        |           |            |  [phone] (Fax)  |                   |
+--------+-----------+------------+----------------------+-------------------+
| / | Hospital  | Radiology  |   Popeye Townsend, |                   |
|    | Encounter |            |  MD  [address] |                   |
|        |           |            |  St.  [place],   |                   |
|        |           |            | [place] [address]             |                   |
|        |           |            | [phone]         |                   |
|        |           |            | [phone] (Fax)   |                   |
+--------+-----------+------------+----------------------+-------------------+
| / | Surgery   | Radiology  |   Popeye Townsend, | CV EP PPM SYSTEM  |
|    |           |            |  MD  [address] | IMPLANT           |
|        |           |            |  St.  [place],   |                   |
|        |           |            | [place] [address]             |                   |
|        |           |            | [phone]         |                   |
|        |           |            | [phone] (Fax)   |                   |
+--------+-----------+------------+----------------------+-------------------+
| 02/10/ | Clinical  | Cardiology |                      |                   |
|    | Support   |            |                      |                   |
+--------+-----------+------------+----------------------+-------------------+
| / | Office    | Cardiology |   Tonia Wilson,    |                   |
|    | Visit     |            | ARNP  [address]   |                   |
 
|        |           |            | St  [place], [place]  |                   |
|        |           |            | [address]  [phone]  |                   |
|        |           |            |  [phone] (Fax)  |                   |
+--------+-----------+------------+----------------------+-------------------+
| / | Off-Site  | Nephrology |   Marzena Moser  |                   |
|    | Visit     |            | DO ETIENNE  [address] [place] [address]      |                   |
|        |           |            | [address]      |                   |
|        |           |            | [place]      |                   |
|        |           |            | [address]  [phone]  |                   |
|        |           |            |  [phone] (Fax)  |                   |
+--------+-----------+------------+----------------------+-------------------+
 documented as of this encounter
 
 Procedures
 
 
+------------------+--------+------------+----------------------+----------------------+
| Procedure Name   | Priori | Date/Time  | Associated Diagnosis | Comments             |
|                  | ty     |            |                      |                      |
+------------------+--------+------------+----------------------+----------------------+
| CULTURE, URINE,  | Routin | 2018 |                      |   Results for this   |
| REFLEXIVE        | e      |            |                      | procedure are in the |
|                  |        |            |                      |  results section.    |
+------------------+--------+------------+----------------------+----------------------+
 documented in this encounter
 
 Results
 Culture, Urine, Reflexive (2018)
 
+-------------+------------------------+-----------+------------+--------------+
| Component   | Value                  | Ref Range | Performed  | Pathologist  |
|             |                        |           | At         | Signature    |
+-------------+------------------------+-----------+------------+--------------+
| Urine       | >[id number]:        |           |            |              |
| Culture,    | Klebsiella pneumoniae  |           |            |              |
| Comprehensi |                        |           |            |              |
| ve          |                        |           |            |              |
+-------------+------------------------+-----------+------------+--------------+
 
 
 
+----------+
| Specimen |
+----------+
|          |
+----------+
 
 
 
+-------------+------------------+--------+----------------+
| Organism    | Antibiotic       | Method | Susceptibility |
+-------------+------------------+--------+----------------+
| Klebsiella  | Amoxicillin +    |        |   Sensitive    |
| pneumoniae  | Clavulanate      |        |                |
+-------------+------------------+--------+----------------+
| Klebsiella  | Piperacillin +   |        |   Sensitive    |
| pneumoniae  | Tazobactam       |        |                |
+-------------+------------------+--------+----------------+
| Klebsiella  | Cefazolin        |        |   Sensitive    |
| pneumoniae  |                  |        |                |
 
+-------------+------------------+--------+----------------+
| Klebsiella  | Ceftriaxone      |        |   Sensitive    |
| pneumoniae  |                  |        |                |
+-------------+------------------+--------+----------------+
| Klebsiella  | Ciprofloxacin    |        |   Sensitive    |
| pneumoniae  |                  |        |                |
+-------------+------------------+--------+----------------+
| Klebsiella  | Levofloxacin     |        |   Sensitive    |
| pneumoniae  |                  |        |                |
+-------------+------------------+--------+----------------+
| Klebsiella  | Tetracycline     |        |   Sensitive    |
| pneumoniae  |                  |        |                |
+-------------+------------------+--------+----------------+
| Klebsiella  | Trimethoprim +   |        |   Sensitive    |
| pneumoniae  | Sulfamethoxazole |        |                |
+-------------+------------------+--------+----------------+
| Klebsiella  | Ertapenem        |        |   Sensitive    |
| pneumoniae  |                  |        |                |
+-------------+------------------+--------+----------------+
| Klebsiella  | Imipenem         |        |   Sensitive    |
| pneumoniae  |                  |        |                |
+-------------+------------------+--------+----------------+
| Klebsiella  | Gentamicin       |        |   Sensitive    |
| pneumoniae  |                  |        |                |
+-------------+------------------+--------+----------------+
| Klebsiella  | Nitrofurantoin   |        |   Intermediate |
| pneumoniae  |                  |        |                |
+-------------+------------------+--------+----------------+
| Klebsiella  | Ampicillin       |        |   Resistant    |
| pneumoniae  |                  |        |                |
+-------------+------------------+--------+----------------+
 documented in this encounter
 
 Visit Diagnoses
 Not on filedocumented in this encounter

## 2020-01-08 NOTE — XMS
Encounter Summary
  Created on: 2020
 
 Viviana Ricci
 External Reference #: [id number]
 : 47
 Sex: Female
 
 Demographics
 
 
+-----------------------+----------------------+
| Address               | [address]      |
|                       | [place]  [address] |
+-----------------------+----------------------+
| Home Phone            | [phone]      |
+-----------------------+----------------------+
| Preferred Language    | Unknown              |
+-----------------------+----------------------+
| Marital Status        | Single               |
+-----------------------+----------------------+
| Temple Affiliation | 1009                 |
+-----------------------+----------------------+
| Race                  | Unknown              |
+-----------------------+----------------------+
| Ethnic Group          | Unknown              |
+-----------------------+----------------------+
 
 
 Author
 
 
+--------------+--------------------------------------------+
| Author       | [facility]  |
|              | and [place]                                |
+--------------+--------------------------------------------+
| Organization | [facility]  |
|              | and [place]                                |
+--------------+--------------------------------------------+
| Address      | Unknown                                    |
+--------------+--------------------------------------------+
| Phone        | Unavailable                                |
+--------------+--------------------------------------------+
 
 
 
 Support
 
 
+----------------+--------------+---------------------+-----------------+
| Name           | Relationship | Address             | Phone           |
+----------------+--------------+---------------------+-----------------+
| Ada/Ed Radhames | ECON         | [address]              | [phone] |
|                |              | [place], [place]  |                 |
|                |              |  [address]              |                 |
+----------------+--------------+---------------------+-----------------+
 
 
 
 
 Care Team Providers
 
 
+-----------------------+------+-------------+
| Care Team Member Name | Role | Phone       |
+-----------------------+------+-------------+
 PCP  | Unavailable |
+-----------------------+------+-------------+
 
 
 
 Reason for Referral
 Diagnostic/Screening (Routine)
 
+--------+--------+-----------+--------------+--------------+---------------+
| Status | Reason | Specialty | Diagnoses /  | Referred By  | Referred To   |
|        |        |           | Procedures   | Contact      | Contact       |
+--------+--------+-----------+--------------+--------------+---------------+
| Closed |        | Radiology |   Diagnoses  |   Tommy,   |   [facility]     |
|        |        |           |  Chronic     | Edgar GARCIA MD   | [address]  |
|        |        |           | right        | [address] |  [place], |
|        |        |           | shoulder     |   [place]      |  [place]           |
|        |        |           | pain         | [place]    | [address]    |
|        |        |           | Procedures   | [address]        | Phone:        |
|        |        |           | MRI Shoulder | Phone:       | [phone]  |
|        |        |           |  Right wo    | [phone] |  Fax:         |
|        |        |           | Contrast     |   Fax:       | [phone]  |
|        |        |           |              | [phone] |               |
+--------+--------+-----------+--------------+--------------+---------------+
 
 
 
 
 Reason for Visit
 
 
+---------------+--------------------------------------------+
| Reason        | Comments                                   |
+---------------+--------------------------------------------+
| Shoulder Pain | EPNP" Right Shoulder Pain onset X Oct 2016 |
+---------------+--------------------------------------------+
 Evaluate & Treat (Routine)
 
+--------+--------------+-------------+--------------+--------------+---------------+
| Status | Reason       | Specialty   | Diagnoses /  | Referred By  | Referred To   |
|        |              |             | Procedures   | Contact      | Contact       |
+--------+--------------+-------------+--------------+--------------+---------------+
| Closed |   Specialty  | Orthopedic  |   Diagnoses  |   Shanti,  |   Tommy    |
|        | Services     | Surgery     |              | Marzena CLIFTON,   | Edgar GARCIA MD    |
|        | Required     |             | Degenerative | DO  [address]  |
|        |              |             |  joint       |  [address], [place] |  [place], |
|        |              |             | disease of   |  [address]  [place]  |  [place] [address]     |
|        |              |             | right        | [place]    | Phone:        |
|        |              |             | acromioclavi | [address]        | [phone]  |
|        |              |             | cular joint  | Phone:       |  Fax:         |
|        |              |             |  Chronic     | [phone] | [phone]  |
|        |              |             | right        |   Fax:       |               |
|        |              |             | shoulder     | [phone] |               |
|        |              |             | pain         |              |               |
 
+--------+--------------+-------------+--------------+--------------+---------------+
 
 
 
 
 Encounter Details
 
 
+--------+---------+----------------------+----------------------+----------------+
| Date   | Type    | Department           | Care Team            | Description    |
+--------+---------+----------------------+----------------------+----------------+
| / | Office  |   [facility]          |   Edgar Sanders,   | Chronic right  |
| 2017   | Visit   | ORTHOPEDIC SURGERY   | MD  [address] [facility]     | shoulder pain  |
|        |         | [address] [facility]     | [place] [facility] [place]      | (Primary Dx)   |
|        |         | [place], [place]      | [address]  [phone]  |                |
|        |         | [address]           |  [phone] (Fax)  |                |
|        |         | [phone]         |                      |                |
+--------+---------+----------------------+----------------------+----------------+
 
 
 
 Social History
 
 
+--------------+-------+-----------+--------+------+
| Tobacco Use  | Types | Packs/Day | Years  | Date |
|              |       |           | Used   |      |
+--------------+-------+-----------+--------+------+
| Never Smoker |       |           |        |      |
+--------------+-------+-----------+--------+------+
 
 
 
+---------------------+---+---+---+
| Smokeless Tobacco:  |   |   |   |
| Never Used          |   |   |   |
+---------------------+---+---+---+
 
 
 
+---------------------------------------------------------------+
| Comments: some second hand smoke exposure, but fairly minimal |
+---------------------------------------------------------------+
 
 
 
+-------------+-------------+---------+----------+
| Alcohol Use | Drinks/Week | oz/Week | Comments |
+-------------+-------------+---------+----------+
| No          |             |         |          |
+-------------+-------------+---------+----------+
 
 
 
+------------------+---------------+
| Sex Assigned at  | Date Recorded |
| Birth            |               |
+------------------+---------------+
| Not on file      |               |
+------------------+---------------+
 
 
 
 
+----------------+-------------+-------------+
| Job Start Date | Occupation  | Industry    |
+----------------+-------------+-------------+
| Not on file    | Not on file | Not on file |
+----------------+-------------+-------------+
 
 
 
+----------------+--------------+------------+
| Travel History | Travel Start | Travel End |
+----------------+--------------+------------+
 
 
 
+-------------------------------------+
| No recent travel history available. |
+-------------------------------------+
 documented as of this encounter
 
 Last Filed Vital Signs
 
 
+-------------------+-------------------+----------------------+----------+
| Vital Sign        | Reading           | Time Taken           | Comments |
+-------------------+-------------------+----------------------+----------+
| Blood Pressure    | -                 | -                    |          |
+-------------------+-------------------+----------------------+----------+
| Pulse             | -                 | -                    |          |
+-------------------+-------------------+----------------------+----------+
| Temperature       | -                 | -                    |          |
+-------------------+-------------------+----------------------+----------+
| Respiratory Rate  | -                 | -                    |          |
+-------------------+-------------------+----------------------+----------+
| Oxygen Saturation | -                 | -                    |          |
+-------------------+-------------------+----------------------+----------+
| Inhaled Oxygen    | -                 | -                    |          |
| Concentration     |                   |                      |          |
+-------------------+-------------------+----------------------+----------+
| Weight            | 121.1 kg (267 lb) | 2017  9:49 AM  |          |
|                   |                   | PST                  |          |
+-------------------+-------------------+----------------------+----------+
| Height            | 167.6 cm (5' 6")  | 2017  9:49 AM  |          |
|                   |                   | PST                  |          |
+-------------------+-------------------+----------------------+----------+
| Body Mass Index   | 43.09             | 2017  9:49 AM  |          |
|                   |                   | PST                  |          |
+-------------------+-------------------+----------------------+----------+
 documented in this encounter
 
 Progress Notes
 Edgar Sanders MD - 2017  9:50 PM PSTFormatting of this note might be different fro
m the original.
 History of present illness: Viviana is a 71 y.o. female who presents with a chief complaint ri
ght shoulder pain
 
 She is new to me but I have known her for many years. I did a right TKA in  and I also 
helped Dr. Hubbard with a supracondylar distal humerus fracture and subsequent hardware re
 
moval right elbow
 I also did an ulnar nerve decompression right elbow subsequent to that timeframe that I rem
ember well but we dont' have records of - she had a midshaft humerus ORIF in 
 She has been on hemodialysis for many years
 Her right shoulder started hurting in November and it hasn't gotten better in spite of time
 and activity modifications
 She hurts with attempt at overhead use
 She has to use her other hand to help her right arm up to overhead position and again to he
lp lower it through the same arc of motion
 With her arm down low she doesn't have much trouble
 She doesn't like to sleep on her right side
 
 Past Medical History 
 Diagnosis Date 
   Kidney replaced by transplant  
   Complication of transplanted kidney  
   Coronary artery disease  
   s/p CABG , cardiac cath in  
   Pulmonary hypertension (HCC)  
   thought secondary to nocturnal hypoxemia 
   Obesity hypoventilation syndrome (HCC)  
   JACK (obstructive sleep apnea)  
   Diabetes mellitus, type 2 (HCC)  
   Secondary hyperparathyroidism ([facility])  
   Hypothyroidism  
   Hyperlipidemia  
   Chronic low back pain  
   Movement disorder  
   tremor of unclear etiology 
   DJD (degenerative joint disease)  
   s/p knee replacement 
   Humerus fracture  
   right supracondylar distal humerus fracture 
   Carpal tunnel syndrome  
   Anemia in chronic renal disease  
   resolved after transplant 
   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  
   complicating kidney transplant 
   FSGS (focal segmental glomerulosclerosis)  
   ESRD (end stage renal disease) ([facility])  
   HTN (hypertension)  
 
 Past Surgical History 
 Procedure Laterality Date 
   Cholecystectomy  1972 
   Insert peritoneal catheter  1998 
   x 2 
   Kidney transplant  2000 
   Total knee arthroplasty  2004 
   Right 
   Hysterectomy  2003 
   Abdominal exploration surgery  2006 
   Parathyroidectomy  2007 
   Carpal tunnel release  2005 
   Coronary artery bypass graft   
   3 
   Xr epidural injection  10/2014 
   In back 
 
 No Known Allergies
 
 Current Outpatient Prescriptions on File Prior to Visit 
 Medication Sig Dispense Refill 
   allopurinol (ZYLOPRIM) 100 mg tablet Take 1 tablet by mouth Daily. 30 tablet 11 
   aspirin 81 mg EC tablet Take 81 mg by mouth Daily.   
   cholecalciferol (VITAMIN D-3) 2000 UNITS TABS Take 5,000 Units by mouth Every other day
.   
   cinacalcet (SENSIPAR) 30 mg tablet Take 1 tablet by mouth Daily. 30 tablet 11 
   fludrocortisone (FLORINEF) 0.1 mg tablet Take 1 tablet by mouth Every other day. 90 tab
let 4 
   furosemide (LASIX) 40 mg tablet Take 1 tablet by mouth Daily as needed. 30 tablet 5 
   glucose blood VI test strips (ONE TOUCH ULTRA TEST) strip Check blood sugar before each
 meal and as directed 100 each 12 
   insulin glargine (LANTUS) 100 units/mL injection (vial) Inject 20 Units under the skin 
every morning. 10 mL 11 
   insulin lispro (HUMALOG) 100 units/mL injection (vial) Inject subcutaneously before sara
ls according to sliding scale 10 vial 11 
   Insulin Syringe-Needle U-100 (BD INSULIN SYRINGE ULTRAFINE) 31G X 5/16" 0.5 ML MISC Use
 before meals and as directed. 100 each 11 
   levothyroxine (LEVOTHROID) 50 mcg tablet Take 1 tablet by mouth Daily. 30 tablet 11 
   lisinopril (PRINIVIL,ZESTRIL) 30 MG tablet Take 1 tablet by mouth Daily. 90 tablet 3 
   loperamide (ANTI-DIARRHEAL) 2 mg capsule Take 1 capsule by mouth 4 times daily as neede
d. 60 capsule 5 
   losartan (COZAAR) 50 mg tablet Take 1 tablet by mouth Daily. 90 tablet 3 
   magnesium oxide (MAG-OX) 400 mg tablet Take 1 tablet by mouth 2 times daily. 60 tablet 
11 
   metoprolol tartrate (LOPRESSOR) 25 mg tablet Take 1 tablet by mouth 2 times daily. 60 t
ablet 11 
   mycophenolate (CELLCEPT) 250 mg capsule TAKE (1) CAPSULE BY MOUTH THREE TIMES DAILY. 90
 capsule 11 
   omeprazole (PRILOSEC) 20 mg capsule Take one capsule by mouth once daily on an empty st
omach 90 capsule 4 
   predniSONE (DELTASONE) 5 mg tablet Take 1 tablet by mouth Daily. 90 tablet 3 
   Prenatal Multivit-Min-Fe-FA (PRENATAL VITAMINS) 0.8 MG TABS Take 0.8 mg by mouth Daily.
 30 each 11 
   Respiratory Therapy Supplies MISC Decrease CPAP to 12-18 cmH2O Diagnosis Code(s)327.23.
 Please send order to [facility]. 1 each 0 
   rosuvastatin (CRESTOR) 20 mg tablet Take 1 tablet by mouth nightly. 30 tablet 11 
   tacrolimus (PROGRAF) 0.5 mg capsule TAKE (1) CAPSULE TWICE DAILY WITH 1MG CAPSULE (TOTA
L DOSE = 1.5MG TWICE DAILY). 60 capsule 11 
   tacrolimus (PROGRAF) 1 mg capsule TAKE (1) CAPSULE TWICE DAILY WITH 0.5MG CAPSULE (TOTA
L DOSE = 1.5MG TWICE DAILY) 60 capsule 11 
 
 No current facility-administered medications on file prior to visit. 
 
 Family History 
 Problem Relation Age of Onset 
   Parkinsonism Father  
   Heart disease Father  
   Ovarian cancer Mother  
   Other (see comment) Brother  
   paralyzed in accident and then  from complications 
 
 Social History 
 
 Social History 
   Marital Status: Single 
   Spouse Name: N/A 
   Number of Children: 0 
   Years of Education: N/A 
 
 
 Occupational History 
   .  Disabled 
   Retired 
 
 Social History Main Topics 
   Smoking status: Never Smoker  
   Smokeless tobacco: Never Used 
    Comment: some second hand smoke exposure, but fairly minimal 
   Alcohol Use: No 
   Drug Use: No 
   Sexual Activity: Not on file 
 
 Other Topics Concern 
   Not on file 
 
 Social History Narrative 
  Exercise: None 
  Caffeine: 1-2 soda daily 
  Living situation: alone in own home 
   
  Has a dog at home. No other animal exposures. Had birds as a child.  
   
  Grew up in [place], [place]. 
   
   
   
   
   
 
 Review of Systems 
 
 Eyes:      [] Double vision     [] Glasses/contacts      [x] Failing vision  
 
 Ear/Nose/Throat:      [] Frequent Colds       [] Sinus Disease         [] Nose obstruction 
  
            [] Sneezing Spells           [] Change in taste           [] Artificial teeth
            [] Ears ringing                 [] Ear pain                      [x] Hearing los
s                             
           [x] Teeth problems         [] Hoarseness           [] Neck swelling 
                        [] Sore throat               [] Congestion
   [] Nosebleeds             [] Nasal allergies
 
 Respiratory:              [] Asthma/Wheezing      [] Pneumonia     [] Night sweats   
          [] Shortness of breath        [] Chronic cough          [] Coughing up blood
    [] Exposure to tuberculosis     
 
 Cardiovascular:        [] Heart Problems      [x] Hypertension     [] Heart murmur  
              [] Palpitations                [] Rheumatic fever              [] Phlebitis
         [] Chest pain           [] Ankle swelling        [] Leg cramps             [] Racin
g heart
  [] Skipping beats               [] Blood clots 
 Gastrointestinal:      [] Abdominal pain     [] Heartburn    [] Blood from rectum   
              [] Colitis                 [] Gallbladder problems                   [] Troubl
e swallowing
  [x] Bloated stomach                [] Change in stools             [] Vomiting blood
                [] Nausea               [] Hemorrhoids           []  Jaundice              [
]  Hepatitis
  [] Diarrhea                   [] Constipation                [] Diverticulitis 
 
 Urinary Tract:      [] Painful urination     [] Kidney Stones     [] Any urine leakage   
 
                [] Weak urine stream   [] Night urination  [] Urine infections  
  [] Bedwetting   [] Blood in urine  
 
 Skin:      [] Skin rashes              [] Itching/Burning             [x] Skin bruises easi
ly  
                [] Artificial tanning          [] Skin cancer           [] Hair loss       
                         [] Changes in moles  
 
 Musculoskeletal:   [x] Physical handicaps [] Back or shoulder pain
  []Rheumatoid disease          [] Osteoarthritis        [x]  Joint pain            
         [] Joint swelling     []Gout                   []  Leg cramps at night
 
 Neurological:      [] Headaches     [] Seizures     [] Stroke/TIA   
                [] Faintness  [] Tremors    [x] Numbness [] Dizziness  
       [] Changes in handwriting  []  Memory loss  []  Shooting pains
 
 Psychiatric:  [] Depression     []  Suicidal thoughts 
  []  Sleep pattern changes                                  [] Appetite changes
  [] Recent counseling    [] Nervousness/anxiety     []  Physical violence 
             []  Marital problems
 Endocrine:      [] Thyroid     [] Diabetes     
 
 Systemic:    []Weight loss/gain (over 10 lbs)      []Fever/chills        []Fatigue   
                      [] Sleeping Difficulties  [] Speech change  [] Voice change
 
 Filed Vitals: 
  17 0949 
 PainSc:   1 
 PainLoc: Shoulder 
  Estimated body mass index is 43.12 kg/(m^2) as calculated from the following:
   Height as of this encounter: 1.676 m (5' 6").
   Weight as of this encounter: 121.11 kg (267 lb).
 
 On physical exam she has weakness to abduction in the scapular plane
 She has good passive forward elevation and abduction
 She has ER 40 degrees
 With internal rotation she can place her right hand to lower lumbar spine
 She is tender to palpation over the coracoacromial arch
 The AC joint is mildly tender
 She doesn't have any posterior tenderness
 Negative drop arm sign but it is very weak
 Negative alisson sign
 Negative gustavo lift off sign
 She has multiple scars about her right upper extremity from all of her past surgeries
 Including her palpable shunt for hemodialysis
 
 xrays right shoulder reviewed by me show no superior migration of the humeral head and no g
lenohumeral joint space narrowing
 
 Assessment:suspect right shoulder rotator cuff tear
 
 Plan: MRI right shoulder
 She will return after MRI is obtained
 We discussed the nature of rotator cuff disease and discussed the options depending on the 
MRI findings
 
  Electronically signed by Edgar Sanders MD at 2017 10:08 AM PSTdocumented in this e
ncounter
 
 Plan of Treatment
 
 
 
+--------+-----------+------------+----------------------+-------------------+
| Date   | Type      | Specialty  | Care Team            | Description       |
+--------+-----------+------------+----------------------+-------------------+
| / | Office    | Cardiology |   Tonia Wilson,    |                   |
|    | Visit     |            | [address]   |                   |
|        |           |            | St [place], [place]  |                   |
|        |           |            | [address]  [phone]  |                   |
|        |           |            |  [phone] (Fax)  |                   |
+--------+-----------+------------+----------------------+-------------------+
| / | Hospital  | Radiology  |   Popeye Townsend, |                   |
|    | Encounter |            |  MD  [address] |                   |
|        |           |            |  [place],   |                   |
|        |           |            | [place] [address]             |                   |
|        |           |            | [phone]         |                   |
|        |           |            | [phone] (Fax)   |                   |
+--------+-----------+------------+----------------------+-------------------+
| / | Surgery   | Radiology  |   Popeye Townsend, | CV EP PPM SYSTEM  |
|    |           |            |  MD  [address] | IMPLANT           |
|        |           |            |  [place],   |                   |
|        |           |            | [place] [address]             |                   |
|        |           |            | [phone]         |                   |
|        |           |            | [phone] (Fax)   |                   |
+--------+-----------+------------+----------------------+-------------------+
| 02/10/ | Clinical  | Cardiology |                      |                   |
|    | Support   |            |                      |                   |
+--------+-----------+------------+----------------------+-------------------+
| / | Office    | Cardiology |   KatieArlena,    |                   |
|    | Visit     |            | ARN  [address]   |                   |
|        |           |            |   [place]  |                   |
|        |           |            | [address]  [phone]  |                   |
|        |           |            |  [phone] (Fax)  |                   |
+--------+-----------+------------+----------------------+-------------------+
| / | Off-Site  | Nephrology |   Marzena Moser  |                   |
|    | Visit     |            | DO ETIENNE  [address] [place] [address]      |                   |
|        |           |            | [address]      |                   |
|        |           |            | [place]      |                   |
|        |           |            | [address]  [phone]  |                   |
|        |           |            |  [phone] (Fax)  |                   |
+--------+-----------+------------+----------------------+-------------------+
 documented as of this encounter
 
 Results
 MRI Shoulder Right wo Contrast (03/10/2017  1:11 PM PST)
 
+----------+
| Specimen |
+----------+
|          |
+----------+
 
 
 
+------------------------------------------------------------------------+-----------------+
| Narrative                                                              | Performed At    |
+------------------------------------------------------------------------+-----------------+
|   EXAM: MRI SHOULDER RIGHT WO CONTRAST dated 3/10/2017 12:36 PM        |   [facility]    |
| HISTORY:   right shoulder pain- evaluate for rotator cuff tear         | [facility]        |
| COMPARISON: Outside radiographs dated 2016.     TECHNIQUE: | MEDICAL CENTER  |
 
|  Multiplanar multisequence MR imaging of the right shoulder without    | - IMAGING       |
| contrast. Imaging is performed on a 3 Marichuy MRI.     FINDINGS:         |                 |
| Rotator Cuff:     There is a near complete tear of the rotator cuff.   |                 |
|   There is a complete  full-thickness tear involving the supraspinatus |                 |
|  tendon.   The fibers are  retracted deep to the acromion.   There are |                 |
|  very heterogeneous, thickened,  irregular.   A significant portion of |                 |
|  the infraspinatus tendon appears similarly  torn and retracted.       |                 |
|   There is a component of the posterior tendon which is  thickened,    |                 |
| irregular, but attached at the humeral insertion.   There is a intact  |                 |
|  teres minor tendon.   There is tendinosis and low to moderate grade   |                 |
| tearing in  the subscapularis tendon.   There is severe atrophy in the |                 |
|  supraspinatus muscle.   There is mild to moderate atrophy in the      |                 |
| infraspinatus muscle.     Labrum and biceps tendon:     Diffuse        |                 |
| degenerative irregularity throughout the remnant portions of the       |                 |
| labrum.   The biceps labral anchor is intact.   There is thickening    |                 |
| and increased signal  throughout the intra-articular and               |                 |
| extra-articular components of the long head  of the biceps tendon.     |                 |
|   The biceps tendon is in place and the biceps groove.                 |                 |
| Glenohumeral and acromioclavicular joints:     Diffuse moderate        |                 |
| degenerative changes throughout the glenohumeral joint.   Moderate     |                 |
| degenerative changes throughout the acromioclavicular joint.   Fluid   |                 |
| in  the subacromial/subdeltoid bursa appears to communicate with the   |                 |
| acromioclavicular joint consistent with capsular disruption.   The     |                 |
| acromion is a  type II.   There is subacromial enthesopathy.           |                 |
| Other:     There is a moderate-sized joint effusion.     IMPRESSION -  |                 |
|     There is a complete tear of the supraspinatus tendon and near      |                 |
| complete of the  infraspinatus tendon.   This is associated with       |                 |
| severe atrophy of the  supraspinatus muscle and mild to moderate       |                 |
| atrophy of the infraspinatus muscle.     Tendinosis and low to         |                 |
| moderate grade tearing of the subscapularis tendon.     Tendinosis     |                 |
| throughout the long head of the biceps tendon.     Moderate            |                 |
| degenerative changes in the glenohumeral joint.     Moderate           |                 |
| degenerative changes in the acromioclavicular joint.   Prominent       |                 |
| subacromial enthesopathy.     Dictated and Signed by: Brooks Zhang, |                 |
|  MD    Electronically signed: 3/10/2017 3:17 PM                        |                 |
+------------------------------------------------------------------------+-----------------+
 
 
 
+------------------------------------------------------------------------------------------+
| Procedure Note                                                                           |
+------------------------------------------------------------------------------------------+
|   Ralf, Rad Results In - 03/10/2017  3:20 PM PST  EXAM: MRI SHOULDER RIGHT WO CONTRAST    |
| dated 3/10/2017 12:36 PMHISTORY:  right shoulder pain- evaluate for rotator cuff         |
| tearCOMPARISON: Outside radiographs dated 2016.TECHNIQUE: Multiplanar        |
| multisequence MR imaging of the right shoulder withoutcontrast. Imaging is performed on  |
| a 3 Marichuy MRI.FINDINGS: Rotator Cuff:There is a near complete tear of the rotator cuff.  |
|  There is a completefull-thickness tear involving the supraspinatus tendon.  The fibers  |
| areretracted deep to the acromion.  There are very heterogeneous, thickened,irregular.   |
| A significant portion of the infraspinatus tendon appears similarlytorn and retracted.   |
| There is a component of the posterior tendon which isthickened, irregular, but attached  |
| at the humeral insertion.  There is a intactteres minor tendon.  There is tendinosis and |
|  low to moderate grade tearing inthe subscapularis tendon.  There is severe atrophy in   |
| the supraspinatus muscle. There is mild to moderate atrophy in the infraspinatus         |
| muscle.Labrum and biceps tendon:Diffuse degenerative irregularity throughout the remnant |
|  portions of the labrum. The biceps labral anchor is intact.  There is thickening and    |
| increased signalthroughout the intra-articular and extra-articular components of the     |
| long headof the biceps tendon.  The biceps tendon is in place and the biceps groove.     |
| Glenohumeral and acromioclavicular joints:Diffuse moderate degenerative changes          |
| throughout the glenohumeral joint. Moderate degenerative changes throughout the          |
 
| acromioclavicular joint.  Fluid inthe subacromial/subdeltoid bursa appears to            |
| communicate with theacromioclavicular joint consistent with capsular disruption.  The    |
| acromion is atype II.  There is subacromial enthesopathy.Other:There is a moderate-sized |
|  joint effusion.IMPRESSION -There is a complete tear of the supraspinatus tendon and     |
| near complete of theinfraspinatus tendon.  This is associated with severe atrophy of     |
| thesupraspinatus muscle and mild to moderate atrophy of the infraspinatus                |
| muscle.Tendinosis and low to moderate grade tearing of the subscapularis                 |
| tendon.Tendinosis throughout the long head of the biceps tendon.Moderate degenerative    |
| changes in the glenohumeral joint.Moderate degenerative changes in the acromioclavicular |
|  joint.  Prominentsubacromial enthesopathy.Dictated and Signed by: Brooks Zhang MD   |
| Electronically signed: 3/10/2017 3:17 PM                                                 |
|                                                                                          |
|Glenohumeral and acromioclavicular joints:                                                |
|                                                                                          |
|Diffuse moderate degenerative changes throughout the glenohumeral joint.                  |
|Moderate degenerative changes throughout the acromioclavicular joint.  Fluid in           |
|the subacromial/subdeltoid bursa appears to communicate with the                          |
|acromioclavicular joint consistent with capsular disruption.  The acromion is a           |
|type II.  There is subacromial enthesopathy.                                              |
|                                                                                          |
|Other:                                                                                   |
|                                                                                          |
|There is a moderate-sized joint effusion.                                                 |
|                                                                                          |
|IMPRESSION -                                                                              |
|                                                                                          |
|There is a complete tear of the supraspinatus tendon and near complete of the             |
|infraspinatus tendon.  This is associated with severe atrophy of the                      |
|supraspinatus muscle and mild to moderate atrophy of the infraspinatus muscle.            |
|                                                                                          |
|Tendinosis and low to moderate grade tearing of the subscapularis tendon.                 |
|                                                                                          |
|Tendinosis throughout the long head of the biceps tendon.                                 |
|                                                                                          |
|Moderate degenerative changes in the glenohumeral joint.                                  |
|                                                                                          |
|Moderate degenerative changes in the acromioclavicular joint.  Prominent                  |
|subacromial enthesopathy.                                                                 |
|                                                                                          |
|Dictated and Signed by: Brooks Zhang MD                                               |
| Electronically signed: 3/10/2017 3:17 PM                                                 |
+------------------------------------------------------------------------------------------+
 
 
 
+----------------------+---------------------+--------------------+----------------+
| Performing           | Address             | City/State/Zipcode | Phone Number   |
| Organization         |                     |                    |                |
+----------------------+---------------------+--------------------+----------------+
|   [facility]     |   [address] | [place]    |   [phone] |
| [facility]  |                     | [address]              |                |
| - IMAGING            |                     |                    |                |
+----------------------+---------------------+--------------------+----------------+
 documented in this encounter
 
 Visit Diagnoses
 
 
+-------------------------------------------------------------------------+
| Diagnosis                                                               |
 
+-------------------------------------------------------------------------+
|   Chronic right shoulder pain - Primary  Pain in joint, shoulder region |
+-------------------------------------------------------------------------+
 documented in this encounter

## 2020-01-08 NOTE — XMS
Encounter Summary
  Created on: 2020
 
 Viviana Ricci
 External Reference #: [id number]
 : 47
 Sex: Female
 
 Demographics
 
 
+-----------------------+----------------------+
| Address               | [address]      |
|                       | [place]  [address] |
+-----------------------+----------------------+
| Home Phone            | [phone]      |
+-----------------------+----------------------+
| Preferred Language    | Unknown              |
+-----------------------+----------------------+
| Marital Status        | Single               |
+-----------------------+----------------------+
| Gnosticism Affiliation | [id number]                 |
+-----------------------+----------------------+
| Race                  | Unknown              |
+-----------------------+----------------------+
| Ethnic Group          | Unknown              |
+-----------------------+----------------------+
 
 
 Author
 
 
+--------------+--------------------------------------------+
| Author       | [facility]  |
|              | and [place]                                |
+--------------+--------------------------------------------+
| Organization | [facility]  |
|              | and [place]                                |
+--------------+--------------------------------------------+
| Address      | Unknown                                    |
+--------------+--------------------------------------------+
| Phone        | Unavailable                                |
+--------------+--------------------------------------------+
 
 
 
 Support
 
 
+----------------+--------------+---------------------+-----------------+
| Name           | Relationship | Address             | Phone           |
+----------------+--------------+---------------------+-----------------+
| Ada/Ed Radhames | ECON         | [address]              | [phone] |
|                |              | [place]  |                 |
|                |              |  [address]              |                 |
+----------------+--------------+---------------------+-----------------+
 
 
 
 
 Care Team Providers
 
 
+------------------------+------+-----------------+
| Care Team Member Name  | Role | Phone           |
+------------------------+------+-----------------+
| Marzena Moser DO | PCP  | [phone] |
+------------------------+------+-----------------+
 
 
 
 Encounter Details
 
 
+--------+-------------+---------------------+----------------------+----------------------+
| Date   | Type        | Department          | Care Team            | Description          |
+--------+-------------+---------------------+----------------------+----------------------+
| / | Orders Only |   [facility] [place]         |   Marzena Moser  | Breast lump in       |
| 2019   |             | NEPHROLOGY  [address]   | M, DO  [address]      | female (Primary Dx); |
|        |             | [address]   | [address]      |  Kidney replaced by  |
|        |             | [place], [place]     | [place]      | transplant           |
|        |             | [address]          | [address]  [phone]  |                      |
|        |             | [phone]        |  [phone] (Fax)  |                      |
+--------+-------------+---------------------+----------------------+----------------------+
 
 
 
 Social History
 
 
+--------------+-------+-----------+--------+------+
| Tobacco Use  | Types | Packs/Day | Years  | Date |
|              |       |           | Used   |      |
+--------------+-------+-----------+--------+------+
| Never Smoker |       |           |        |      |
+--------------+-------+-----------+--------+------+
 
 
 
+---------------------+---+---+---+
| Smokeless Tobacco:  |   |   |   |
| Never Used          |   |   |   |
+---------------------+---+---+---+
 
 
 
+---------------------------------------------------------------+
| Comments: some second hand smoke exposure, but fairly minimal |
+---------------------------------------------------------------+
 
 
 
+-------------+-------------+---------+----------+
| Alcohol Use | Drinks/Week | oz/Week | Comments |
+-------------+-------------+---------+----------+
| No          |             |         |          |
+-------------+-------------+---------+----------+
 
 
 
 
+------------------+---------------+
| Sex Assigned at  | Date Recorded |
| Birth            |               |
+------------------+---------------+
| Not on file      |               |
+------------------+---------------+
 
 
 
+----------------+-------------+-------------+
| Job Start Date | Occupation  | Industry    |
+----------------+-------------+-------------+
| Not on file    | Not on file | Not on file |
+----------------+-------------+-------------+
 
 
 
+----------------+--------------+------------+
| Travel History | Travel Start | Travel End |
+----------------+--------------+------------+
 
 
 
+-------------------------------------+
| No recent travel history available. |
+-------------------------------------+
 documented as of this encounter
 
 Progress Bobbi Petit RN - 2019  3:18 PM PDTPt called requesting order for follow up [facility]. Order sent to [facility]. Electronically signed by Bobbi Hanson RN at   3:29 PM PDTdocumented in this encounter
 
 Plan of Treatment
 
 
+--------+-----------+------------+----------------------+-------------------+
| Date   | Type      | Specialty  | Care Team            | Description       |
+--------+-----------+------------+----------------------+-------------------+
| / | Office    | Cardiology |   Tonia Wilson,    |                   |
|    | Visit     |            | [facility]  [address]   |                   |
|        |           |            | St [place], [place]  |                   |
|        |           |            | [address]  [phone]  |                   |
|        |           |            |  [phone] (Fax)  |                   |
+--------+-----------+------------+----------------------+-------------------+
| / | Hospital  | Radiology  |   Popeye Townsend, |                   |
|    | Encounter |            |  MD  [address] |                   |
|        |           |            |  [address]  [place],   |                   |
|        |           |            | [place] [address]             |                   |
|        |           |            | [phone]         |                   |
|        |           |            | [phone] (Fax)   |                   |
+--------+-----------+------------+----------------------+-------------------+
| / | Surgery   | Radiology  |   Popeye Townsend, | CV EP PPM SYSTEM  |
|    |           |            |  MD  [address] | IMPLANT           |
|        |           |            |  [place],   |                   |
|        |           |            | [place] [address]             |                   |
|        |           |            | [phone]         |                   |
|        |           |            | [phone] (Fax)   |                   |
+--------+-----------+------------+----------------------+-------------------+
 
| 02/10/ | Clinical  | Cardiology |                      |                   |
|    | Support   |            |                      |                   |
+--------+-----------+------------+----------------------+-------------------+
| / | Office    | Cardiology |   Tonia Wilson,    |                   |
|    | Visit     |            | [facility]  [address]   |                   |
|        |           |            | [place]  |                   |
|        |           |            | [address]  [phone]  |                   |
|        |           |            |  [phone] (Fax)  |                   |
+--------+-----------+------------+----------------------+-------------------+
| / | Off-Site  | Nephrology |   Marzena Moser  |                   |
|    | Visit     |            | DO [facility]  [address] [place] [address]      |                   |
|        |           |            | [address]      |                   |
|        |           |            | [place]      |                   |
|        |           |            | [address]  [phone]  |                   |
|        |           |            |  [phone] (Fax)  |                   |
+--------+-----------+------------+----------------------+-------------------+
 
 
 
+---------------------+---------+--------+----------------------+----------------------+
| Name                | Type    | Priori | Associated Diagnoses | Order Schedule       |
|                     |         | ty     |                      |                      |
+---------------------+---------+--------+----------------------+----------------------+
| US Breast Complete  | Imaging | Routin |   Breast lump in     | Expected:            |
| Left                |         | e      | female  Kidney       | 2019, Expires: |
|                     |         |        | replaced by          |  2020          |
|                     |         |        | transplant           |                      |
+---------------------+---------+--------+----------------------+----------------------+
 documented as of this encounter
 
 Visit Diagnoses
 
 
+-----------------------------------------------------------+
| Diagnosis                                                 |
+-----------------------------------------------------------+
|   Breast lump in female - Primary  Lump or mass in breast |
+-----------------------------------------------------------+
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
+-----------------------------------------------------------+
 documented in this encounter

## 2020-01-08 NOTE — XMS
Encounter Summary
  Created on: 2020
 
 Viviana Ricci
 External Reference #: [id number]
 : 47
 Sex: Female
 
 Demographics
 
 
+-----------------------+----------------------+
| Address               | [address]      |
|                       | [place]  [address] |
+-----------------------+----------------------+
| Home Phone            | [phone]      |
+-----------------------+----------------------+
| Preferred Language    | Unknown              |
+-----------------------+----------------------+
| Marital Status        | Single               |
+-----------------------+----------------------+
| Jain Affiliation | [id number]                 |
+-----------------------+----------------------+
| Race                  | Unknown              |
+-----------------------+----------------------+
| Ethnic Group          | Unknown              |
+-----------------------+----------------------+
 
 
 Author
 
 
+--------------+--------------------------------------------+
| Author       | [facility]  |
|              | and [place]                                |
+--------------+--------------------------------------------+
| Organization | [facility]  |
|              | and [place]                                |
+--------------+--------------------------------------------+
| Address      | Unknown                                    |
+--------------+--------------------------------------------+
| Phone        | Unavailable                                |
+--------------+--------------------------------------------+
 
 
 
 Support
 
 
+----------------+--------------+---------------------+-----------------+
| Name           | Relationship | Address             | Phone           |
+----------------+--------------+---------------------+-----------------+
| Ada/Ed Radhames | ECON         | [address]              | [phone] |
|                |              | [place]  |                 |
|                |              |  [address]              |                 |
+----------------+--------------+---------------------+-----------------+
 
 
 
 
 Care Team Providers
 
 
+------------------------+------+-----------------+
| Care Team Member Name  | Role | Phone           |
+------------------------+------+-----------------+
| Marzena Moser DO | PCP  | [phone] |
+------------------------+------+-----------------+
 
 
 
 Reason for Visit
 
 
+-------------------+----------+
| Reason            | Comments |
+-------------------+----------+
| Medication Refill |          |
+-------------------+----------+
 
 
 
 Encounter Details
 
 
+--------+--------+---------------------+----------------------+-------------------+
| Date   | Type   | Department          | Care Team            | Description       |
+--------+--------+---------------------+----------------------+-------------------+
| 10/27/ | Refill |   [facility]         |   Marzena Moser  | Medication Refill |
| 2017   |        | NEPHROLOGY  [address]   | M, DO  [address]      |                   |
|        |        | [address]   | [address]      |                   |
|        |        | [place], [place]     | [place], [place]      |                   |
|        |        | [address]          | [address]  [phone]  |                   |
|        |        | [phone]        |  [phone] (Fax)  |                   |
+--------+--------+---------------------+----------------------+-------------------+
 
 
 
 Social History
 
 
+--------------+-------+-----------+--------+------+
| Tobacco Use  | Types | Packs/Day | Years  | Date |
|              |       |           | Used   |      |
+--------------+-------+-----------+--------+------+
| Never Smoker |       |           |        |      |
+--------------+-------+-----------+--------+------+
 
 
 
+---------------------+---+---+---+
| Smokeless Tobacco:  |   |   |   |
| Never Used          |   |   |   |
+---------------------+---+---+---+
 
 
 
+---------------------------------------------------------------+
| Comments: some second hand smoke exposure, but fairly minimal |
 
+---------------------------------------------------------------+
 
 
 
+-------------+-------------+---------+----------+
| Alcohol Use | Drinks/Week | oz/Week | Comments |
+-------------+-------------+---------+----------+
| No          |             |         |          |
+-------------+-------------+---------+----------+
 
 
 
+------------------+---------------+
| Sex Assigned at  | Date Recorded |
| Birth            |               |
+------------------+---------------+
| Not on file      |               |
+------------------+---------------+
 
 
 
+----------------+-------------+-------------+
| Job Start Date | Occupation  | Industry    |
+----------------+-------------+-------------+
| Not on file    | Not on file | Not on file |
+----------------+-------------+-------------+
 
 
 
+----------------+--------------+------------+
| Travel History | Travel Start | Travel End |
+----------------+--------------+------------+
 
 
 
+-------------------------------------+
| No recent travel history available. |
+-------------------------------------+
 documented as of this encounter
 
 Plan of Treatment
 
 
+--------+-----------+------------+----------------------+-------------------+
| Date   | Type      | Specialty  | Care Team            | Description       |
+--------+-----------+------------+----------------------+-------------------+
| / | Office    | Cardiology |   Tonia Wilson,    |                   |
|    | Visit     |            | ARNP  [address]   |                   |
|        |           |            | St [place], [place]  |                   |
|        |           |            | [address]  [phone]  |                   |
|        |           |            |  [phone] (Fax)  |                   |
+--------+-----------+------------+----------------------+-------------------+
| / | Hospital  | Radiology  |   Popeye Townsend, |                   |
|    | Encounter |            |  MD  [address] |                   |
|        |           |            |  [place],   |                   |
|        |           |            | [place] [address]             |                   |
|        |           |            | [phone]         |                   |
|        |           |            | [phone] (Fax)   |                   |
+--------+-----------+------------+----------------------+-------------------+
| / | Surgery   | Radiology  |   Popeye Townsend, | CV EP PPM SYSTEM  |
 
|    |           |            |  MD  [address] | IMPLANT           |
|        |           |            |  [place],   |                   |
|        |           |            | [place] [address]             |                   |
|        |           |            | [phone]         |                   |
|        |           |            | [phone] (Fax)   |                   |
+--------+-----------+------------+----------------------+-------------------+
| 02/10/ | Clinical  | Cardiology |                      |                   |
|    | Support   |            |                      |                   |
+--------+-----------+------------+----------------------+-------------------+
| / | Office    | Cardiology |   Tonia Wilson,    |                   |
|    | Visit     |            | ARNP  [address]   |                   |
|        |           |            | [place]  |                   |
|        |           |            | [id number] [phone]  |                   |
|        |           |            |  [phone] (Fax)  |                   |
+--------+-----------+------------+----------------------+-------------------+
| / | Off-Site  | Nephrology |   Marzena Moser  |                   |
|    | Visit     |            | DO ETIENNE  [address] [place] [address]      |                   |
|        |           |            | [address]      |                   |
|        |           |            | [place]      |                   |
|        |           |            | [address] [phone]  |                   |
|        |           |            |  [phone] (Fax)  |                   |
+--------+-----------+------------+----------------------+-------------------+
 documented as of this encounter
 
 Visit Diagnoses
 
 
+-------------------------------------------+
| Diagnosis                                 |
+-------------------------------------------+
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
+-------------------------------------------+
 documented in this encounter

## 2020-01-08 NOTE — XMS
Encounter Summary
  Created on: 2020
 
 Viviana Ricci
 External Reference #: [id number]
 : 47
 Sex: Female
 
 Demographics
 
 
+-----------------------+----------------------+
| Address               | [address]      |
|                       | [place]  [address] |
+-----------------------+----------------------+
| Home Phone            | [phone]      |
+-----------------------+----------------------+
| Preferred Language    | Unknown              |
+-----------------------+----------------------+
| Marital Status        | Single               |
+-----------------------+----------------------+
| Anabaptist Affiliation | [id number]                 |
+-----------------------+----------------------+
| Race                  | Unknown              |
+-----------------------+----------------------+
| Ethnic Group          | Unknown              |
+-----------------------+----------------------+
 
 
 Author
 
 
+--------------+--------------------------------------------+
| Author       | [facility]  |
|              | and [place]                                |
+--------------+--------------------------------------------+
| Organization | [facility]  |
|              | and [place]                                |
+--------------+--------------------------------------------+
| Address      | Unknown                                    |
+--------------+--------------------------------------------+
| Phone        | Unavailable                                |
+--------------+--------------------------------------------+
 
 
 
 Support
 
 
+----------------+--------------+---------------------+-----------------+
| Name           | Relationship | Address             | Phone           |
+----------------+--------------+---------------------+-----------------+
| Ada/Ed Radhames | ECON         | [address]              | [phone] |
|                |              | [place]  |                 |
|                |              |  [address]              |                 |
+----------------+--------------+---------------------+-----------------+
 
 
 
 
 Care Team Providers
 
 
+-----------------------+------+-------------+
| Care Team Member Name | Role | Phone       |
+-----------------------+------+-------------+
 PCP  | Unavailable |
+-----------------------+------+-------------+
 
 
 
 Reason for Visit
 
 
+-------------------+----------+
| Reason            | Comments |
+-------------------+----------+
| Medication Refill |          |
+-------------------+----------+
 
 
 
 Encounter Details
 
 
+--------+--------+---------------------+----------------------+-------------------+
| Date   | Type   | Department          | Care Team            | Description       |
+--------+--------+---------------------+----------------------+-------------------+
| / | Refill |   [place]         |   Marzena Moser  | Medication Refill |
|    |        | NEPHROLOGY  [address]   | M, DO  [address]      |                   |
|        |        | [address]   | [address]      |                   |
|        |        | [place], [place]     | [place], [place]      |                   |
|        |        | [address]          | [address]  [phone]  |                   |
|        |        | [phone]        |  [phone] (Fax)  |                   |
+--------+--------+---------------------+----------------------+-------------------+
 
 
 
 Social History
 
 
+--------------+-------+-----------+--------+------+
| Tobacco Use  | Types | Packs/Day | Years  | Date |
|              |       |           | Used   |      |
+--------------+-------+-----------+--------+------+
| Never Smoker |       |           |        |      |
+--------------+-------+-----------+--------+------+
 
 
 
+---------------------+---+---+---+
| Smokeless Tobacco:  |   |   |   |
| Never Used          |   |   |   |
+---------------------+---+---+---+
 
 
 
+---------------------------------------------------------------+
| Comments: some second hand smoke exposure, but fairly minimal |
 
+---------------------------------------------------------------+
 
 
 
+-------------+-------------+---------+----------+
| Alcohol Use | Drinks/Week | oz/Week | Comments |
+-------------+-------------+---------+----------+
| No          |             |         |          |
+-------------+-------------+---------+----------+
 
 
 
+------------------+---------------+
| Sex Assigned at  | Date Recorded |
| Birth            |               |
+------------------+---------------+
| Not on file      |               |
+------------------+---------------+
 
 
 
+----------------+-------------+-------------+
| Job Start Date | Occupation  | Industry    |
+----------------+-------------+-------------+
| Not on file    | Not on file | Not on file |
+----------------+-------------+-------------+
 
 
 
+----------------+--------------+------------+
| Travel History | Travel Start | Travel End |
+----------------+--------------+------------+
 
 
 
+-------------------------------------+
| No recent travel history available. |
+-------------------------------------+
 documented as of this encounter
 
 Plan of Treatment
 
 
+--------+-----------+------------+----------------------+-------------------+
| Date   | Type      | Specialty  | Care Team            | Description       |
+--------+-----------+------------+----------------------+-------------------+
| / | Office    | Cardiology |   HellalfredoTonia,    |                   |
|    | Visit     |            | ARNP  [address]   |                   |
|        |           |            | St  [place], [place]  |                   |
|        |           |            | [address]  [phone]  |                   |
|        |           |            |  [phone] (Fax)  |                   |
+--------+-----------+------------+----------------------+-------------------+
| / | Hospital  | Radiology  |   Popeye Townsend, |                   |
|    | Encounter |            |  MD  [address] |                   |
|        |           |            |  St.  [place],   |                   |
|        |           |            | [place] [address]             |                   |
|        |           |            | [phone]         |                   |
|        |           |            | [phone] (Fax)   |                   |
+--------+-----------+------------+----------------------+-------------------+
| / | Surgery   | Radiology  |   Popeye Townsend, | CV EP PPM SYSTEM  |
 
|    |           |            |  MD  [address] | IMPLANT           |
|        |           |            |  St.  [place],   |                   |
|        |           |            | [place] [address]             |                   |
|        |           |            | [phone]         |                   |
|        |           |            | [phone] (Fax)   |                   |
+--------+-----------+------------+----------------------+-------------------+
| 02/10/ | Clinical  | Cardiology |                      |                   |
|    | Support   |            |                      |                   |
+--------+-----------+------------+----------------------+-------------------+
| / | Office    | Cardiology |   Tonia Wilson,    |                   |
|    | Visit     |            | ARNP  [address]   |                   |
|        |           |            | [place]  |                   |
|        |           |            | [address]  [phone]  |                   |
|        |           |            |  [phone] (Fax)  |                   |
+--------+-----------+------------+----------------------+-------------------+
| / | Off-Site  | Nephrology |   Marzena Moser  |                   |
|    | Visit     |            | DO ETIENNE  [address] [place] [address]      |                   |
|        |           |            | [address]      |                   |
|        |           |            | [place]      |                   |
|        |           |            | [address]  [phone]  |                   |
|        |           |            |  [phone] (Fax)  |                   |
+--------+-----------+------------+----------------------+-------------------+
 documented as of this encounter
 
 Visit Diagnoses
 Not on filedocumented in this encounter

## 2020-01-08 NOTE — XMS
Encounter Summary
  Created on: 2020
 
 Viviana Ricci
 External Reference #: [id number]
 : 47
 Sex: Female
 
 Demographics
 
 
+-----------------------+----------------------+
| Address               | [address]      |
|                       | [place]  [address] |
+-----------------------+----------------------+
| Home Phone            | [phone]      |
+-----------------------+----------------------+
| Preferred Language    | Unknown              |
+-----------------------+----------------------+
| Marital Status        | Single               |
+-----------------------+----------------------+
| Buddhism Affiliation | 1009                 |
+-----------------------+----------------------+
| Race                  | Unknown              |
+-----------------------+----------------------+
| Ethnic Group          | Unknown              |
+-----------------------+----------------------+
 
 
 Author
 
 
+--------------+--------------------------------------------+
| Author       | [facility]  |
|              | and [place]                                |
+--------------+--------------------------------------------+
| Organization | [facility]  |
|              | and [place]                                |
+--------------+--------------------------------------------+
| Address      | Unknown                                    |
+--------------+--------------------------------------------+
| Phone        | Unavailable                                |
+--------------+--------------------------------------------+
 
 
 
 Support
 
 
+----------------+--------------+---------------------+-----------------+
| Name           | Relationship | Address             | Phone           |
+----------------+--------------+---------------------+-----------------+
| Ada/Ed Radhames | ECON         | [place]              | [phone] |
|                |              | [place], [place]  |                 |
|                |              |  [address]              |                 |
+----------------+--------------+---------------------+-----------------+
 
 
 
 
 Care Team Providers
 
 
+-----------------------+------+-------------+
| Care Team Member Name | Role | Phone       |
+-----------------------+------+-------------+
 PCP  | Unavailable |
+-----------------------+------+-------------+
 
 
 
 Reason for Referral
 Evaluate & Treat (Routine)
 
+--------+--------------+-------------+--------------+--------------+---------------+
| Status | Reason       | Specialty   | Diagnoses /  | Referred By  | Referred To   |
|        |              |             | Procedures   | Contact      | Contact       |
+--------+--------------+-------------+--------------+--------------+---------------+
| Closed |   Specialty  | Orthopedic  |   Diagnoses  |   Shanti,  |   Tommy    |
|        | Services     | Surgery     |              | Marzena CLIFTON,   | Edgar GARCIA MD    |
|        | Required     |             | Degenerative | DO  [address] | [address]  |
|        |              |             |  joint       |  [place], [place] |  [place], |
|        |              |             | disease of   |  [address]  [place]  |  [place] [address]     |
|        |              |             | right        | [place]    | Phone:        |
|        |              |             | acromioclavi | [address]        | [phone]  |
|        |              |             | cular joint  | Phone:       |  Fax:         |
|        |              |             |  Chronic     | [phone] | [phone]  |
|        |              |             | right        |   Fax:       |               |
|        |              |             | shoulder     | [phone] |               |
|        |              |             | pain         |              |               |
+--------+--------------+-------------+--------------+--------------+---------------+
 
 
 
 
 Reason for Visit
 
 
+--------+------------------------------------+
| Reason | Comments                           |
+--------+------------------------------------+
| Other  | Requesting referral to Dr. Sanders |
+--------+------------------------------------+
 
 
 
 Encounter Details
 
 
+--------+-----------+---------------------+----------------------+--------------------+
| Date   | Type      | Department          | Care Team            | Description        |
+--------+-----------+---------------------+----------------------+--------------------+
| / | Telephone |   [facility]         |   Marzena Moser  | Other (Requesting  |
|    |           | NEPHROLOGY  [address]   | M, DO  [address]      | referral to     |
|        |           | [address]   | [place]      | Tommy)           |
|        |           | [place]     | [place]      |                    |
|        |           | [address]          | [address]  [phone]  |                    |
|        |           | [phone]        |  [phone] (Fax)  |                    |
+--------+-----------+---------------------+----------------------+--------------------+
 
 
 
 
 Social History
 
 
+--------------+-------+-----------+--------+------+
| Tobacco Use  | Types | Packs/Day | Years  | Date |
|              |       |           | Used   |      |
+--------------+-------+-----------+--------+------+
| Never Smoker |       |           |        |      |
+--------------+-------+-----------+--------+------+
 
 
 
+---------------------+---+---+---+
| Smokeless Tobacco:  |   |   |   |
| Never Used          |   |   |   |
+---------------------+---+---+---+
 
 
 
+---------------------------------------------------------------+
| Comments: some second hand smoke exposure, but fairly minimal |
+---------------------------------------------------------------+
 
 
 
+-------------+-------------+---------+----------+
| Alcohol Use | Drinks/Week | oz/Week | Comments |
+-------------+-------------+---------+----------+
| No          |             |         |          |
+-------------+-------------+---------+----------+
 
 
 
+------------------+---------------+
| Sex Assigned at  | Date Recorded |
| Birth            |               |
+------------------+---------------+
| Not on file      |               |
+------------------+---------------+
 
 
 
+----------------+-------------+-------------+
| Job Start Date | Occupation  | Industry    |
+----------------+-------------+-------------+
| Not on file    | Not on file | Not on file |
+----------------+-------------+-------------+
 
 
 
+----------------+--------------+------------+
| Travel History | Travel Start | Travel End |
+----------------+--------------+------------+
 
 
 
+-------------------------------------+
 
| No recent travel history available. |
+-------------------------------------+
 documented as of this encounter
 
 Plan of Treatment
 
 
+--------+-----------+------------+----------------------+-------------------+
| Date   | Type      | Specialty  | Care Team            | Description       |
+--------+-----------+------------+----------------------+-------------------+
| / | Office    | Cardiology |   Tonia Wilson,    |                   |
|    | Visit     |            | ARNP  [address]   |                   |
|        |           |            | St  [place], [place]  |                   |
|        |           |            | [address]  [phone]  |                   |
|        |           |            |  [phone] (Fax)  |                   |
+--------+-----------+------------+----------------------+-------------------+
| / | Hospital  | Radiology  |   Popeye Townsend, |                   |
|    | Encounter |            |  MD  [address] [place] |                   |
|        |           |            |  [place],   |                   |
|        |           |            | [place] [address]             |                   |
|        |           |            | [phone]         |                   |
|        |           |            | [phone] (Fax)   |                   |
+--------+-----------+------------+----------------------+-------------------+
| / | Surgery   | Radiology  |   Popeye Townsend, | CV EP PPM SYSTEM  |
|    |           |            |  MD  [address] | IMPLANT           |
|        |           |            |  [place],   |                   |
|        |           |            | [place] [address]             |                   |
|        |           |            | [phone]         |                   |
|        |           |            | [phone] (Fax)   |                   |
+--------+-----------+------------+----------------------+-------------------+
| 02/10/ | Clinical  | Cardiology |                      |                   |
|    | Support   |            |                      |                   |
+--------+-----------+------------+----------------------+-------------------+
| / | Office    | Cardiology |   Tonia Wilson,    |                   |
|    | Visit     |            | ARNP  [address]   |                   |
|        |           |            | [place]  |                   |
|        |           |            | [address] [phone]  |                   |
|        |           |            |  [phone] (Fax)  |                   |
+--------+-----------+------------+----------------------+-------------------+
| / | Off-Site  | Nephrology |   Marzena Moser  |                   |
2020   | Visit     |            | DO ETIENNE  [address] [place] [address]      |                   |
|        |           |            | [address]      |                   |
|        |           |            | [place]      |                   |
|        |           |            | [address] [phone]  |                   |
|        |           |            |  [phone] (Fax)  |                   |
+--------+-----------+------------+----------------------+-------------------+
 
 
 
+----------------------+-------------+--------+----------------------+---------------------+
| Name                 | Type        | Priori | Associated Diagnoses | Order Schedule      |
|                      |             | ty     |                      |                     |
+----------------------+-------------+--------+----------------------+---------------------+
| * [facility] [place]          | Outpatient  | Routin |   Degenerative joint | Ordered: 2016 |
| Orthopedic Surgery - | Referral    | e      |  disease of right    |                     |
|  AMB Referral        |             |        | acromioclavicular    |                     |
|                      |             |        | joint  Chronic right |                     |
|                      |             |        |  shoulder pain       |                     |
+----------------------+-------------+--------+----------------------+---------------------+
 documented as of this encounter
 
 
 Visit Diagnoses
 
 
+------------------------------------------------------------------------------------------+
| Diagnosis                                                                                |
+------------------------------------------------------------------------------------------+
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
|  unspecified whether generalized or localized, shoulder region                           |
+------------------------------------------------------------------------------------------+
|   Chronic right shoulder pain  Pain in joint, shoulder region                            |
+------------------------------------------------------------------------------------------+
 documented in this encounter

## 2020-01-08 NOTE — XMS
Encounter Summary
  Created on: 2020
 
 Viviana Ricci
 External Reference #: [id number]
 : 47
 Sex: Female
 
 Demographics
 
 
+-----------------------+----------------------+
| Address               | [address]      |
|                       | [place]  [address] |
+-----------------------+----------------------+
| Home Phone            | [phone]      |
+-----------------------+----------------------+
| Preferred Language    | Unknown              |
+-----------------------+----------------------+
| Marital Status        | Single               |
+-----------------------+----------------------+
| Sabianist Affiliation | 1009                 |
+-----------------------+----------------------+
| Race                  | Unknown              |
+-----------------------+----------------------+
| Ethnic Group          | Unknown              |
+-----------------------+----------------------+
 
 
 Author
 
 
+--------------+--------------------------------------------+
| Author       | [facility]  |
|              | and [place]                                |
+--------------+--------------------------------------------+
| Organization | [facility]  |
|              | and [place]                                |
+--------------+--------------------------------------------+
| Address      | Unknown                                    |
+--------------+--------------------------------------------+
| Phone        | Unavailable                                |
+--------------+--------------------------------------------+
 
 
 
 Support
 
 
+----------------+--------------+---------------------+-----------------+
| Name           | Relationship | Address             | Phone           |
+----------------+--------------+---------------------+-----------------+
| Ada/Ed Radhames | ECON         | [address]              | [phone] |
|                |              | [place]  |                 |
|                |              |  [address]              |                 |
+----------------+--------------+---------------------+-----------------+
 
 
 
 
 Care Team Providers
 
 
+-----------------------+------+-------------+
| Care Team Member Name | Role | Phone       |
+-----------------------+------+-------------+
 PCP  | Unavailable |
+-----------------------+------+-------------+
 
 
 
 Encounter Details
 
 
+--------+-------------+---------------------+----------------------+---------------------+
| Date   | Type        | Department          | Care Team            | Description         |
+--------+-------------+---------------------+----------------------+---------------------+
| / | Orders Only |   [facility]         |   Marzena Moser  | UTI (lower urinary  |
|    |             | NEPHROLOGY  [address]   | M, DO  [address]      | tract infection)    |
|        |             | [address]   | [address]      | (Primary Dx)        |
|        |             | [place], [place]     | [place], [place]      |                     |
|        |             | [address]          | [address]  [phone]  |                     |
|        |             | [phone]        |  [phone] (Fax)  |                     |
+--------+-------------+---------------------+----------------------+---------------------+
 
 
 
 Social History
 
 
+--------------+-------+-----------+--------+------+
| Tobacco Use  | Types | Packs/Day | Years  | Date |
|              |       |           | Used   |      |
+--------------+-------+-----------+--------+------+
| Never Smoker |       |           |        |      |
+--------------+-------+-----------+--------+------+
 
 
 
+---------------------+---+---+---+
| Smokeless Tobacco:  |   |   |   |
| Never Used          |   |   |   |
+---------------------+---+---+---+
 
 
 
+---------------------------------------------------------------+
| Comments: some second hand smoke exposure, but fairly minimal |
+---------------------------------------------------------------+
 
 
 
+-------------+-------------+---------+----------+
| Alcohol Use | Drinks/Week | oz/Week | Comments |
+-------------+-------------+---------+----------+
| No          |             |         |          |
+-------------+-------------+---------+----------+
 
 
 
 
+------------------+---------------+
| Sex Assigned at  | Date Recorded |
| Birth            |               |
+------------------+---------------+
| Not on file      |               |
+------------------+---------------+
 
 
 
+----------------+-------------+-------------+
| Job Start Date | Occupation  | Industry    |
+----------------+-------------+-------------+
| Not on file    | Not on file | Not on file |
+----------------+-------------+-------------+
 
 
 
+----------------+--------------+------------+
| Travel History | Travel Start | Travel End |
+----------------+--------------+------------+
 
 
 
+-------------------------------------+
| No recent travel history available. |
+-------------------------------------+
 documented as of this encounter
 
 Progress Bobbi Petit RN - 2015  1:37 PM PDTPer Dr. Moser's verbal order, patient was 
order cephalexin 500 mg PO BID x 7 days. Patient was called with dosing instructions, she ve
rbally expressed a clear understanding. Electronically signed by Bobbi Hanson RN at   1:38 PM PDTdocumented in this encounter
 
 Plan of Treatment
 
 
+--------+-----------+------------+----------------------+-------------------+
| Date   | Type      | Specialty  | Care Team            | Description       |
+--------+-----------+------------+----------------------+-------------------+
| / | Office    | Cardiology |   Arlen Wilsona,    |                   |
|    | Visit     |            | [address]   |                   |
|        |           |            | [place], [place]  |                   |
|        |           |            | [address]  [phone]  |                   |
|        |           |            |  [phone] (Fax)  |                   |
+--------+-----------+------------+----------------------+-------------------+
| / | Hospital  | Radiology  |   Popeye Townsend, |                   |
|    | Encounter |            |  MD  [address] |                   |
|        |           |            |  [place],   |                   |
|        |           |            | [place] [address]             |                   |
|        |           |            | [phone]         |                   |
|        |           |            | [phone] (Fax)   |                   |
+--------+-----------+------------+----------------------+-------------------+
| / | Surgery   | Radiology  |   Popeye Townsend, | CV EP PPM SYSTEM  |
|    |           |            |  MD  [address] | IMPLANT           |
|        |           |            |  St.  [place],   |                   |
|        |           |            | [place] [address]             |                   |
|        |           |            | [phone]         |                   |
|        |           |            | [phone] (Fax)   |                   |
 
+--------+-----------+------------+----------------------+-------------------+
| 02/10/ | Clinical  | Cardiology |                      |                   |
|    | Support   |            |                      |                   |
+--------+-----------+------------+----------------------+-------------------+
| / | Office    | Cardiology |   RakeshmaxxalfredoTonia,    |                   |
|    | Visit     |            | BELKIS  [address]   |                   |
|        |           |            | [place]  |                   |
|        |           |            | [id number] [phone]  |                   |
|        |           |            |  [phone] (Fax)  |                   |
+--------+-----------+------------+----------------------+-------------------+
| / | Off-Site  | Nephrology |   Marzena Moser  |                   |
|    | Visit     |            | DO ETIENNE  [address] [place] [address]      |                   |
|        |           |            | [address]      |                   |
|        |           |            | [place]      |                   |
|        |           |            | [address]  [phone]  |                   |
|        |           |            |  [phone] (Fax)  |                   |
+--------+-----------+------------+----------------------+-------------------+
 documented as of this encounter
 
 Visit Diagnoses
 
 
+-------------------------------------------------------------------------------------+
| Diagnosis                                                                           |
+-------------------------------------------------------------------------------------+
|   UTI (lower urinary tract infection) - Primary  Urinary tract infection, site not  |
| specified                                                                           |
+-------------------------------------------------------------------------------------+
 documented in this encounter

## 2020-01-08 NOTE — XMS
Encounter Summary
  Created on: 2020
 
 Viviana Ricci
 External Reference #: [id number]
 : 47
 Sex: Female
 
 Demographics
 
 
+-----------------------+----------------------+
| Address               | [address]      |
|                       | [place]  [address] |
+-----------------------+----------------------+
| Home Phone            | [phone]      |
+-----------------------+----------------------+
| Preferred Language    | Unknown              |
+-----------------------+----------------------+
| Marital Status        | Single               |
+-----------------------+----------------------+
| Confucianist Affiliation | [id number]                 |
+-----------------------+----------------------+
| Race                  | Unknown              |
+-----------------------+----------------------+
| Ethnic Group          | Unknown              |
+-----------------------+----------------------+
 
 
 Author
 
 
+--------------+--------------------------------------------+
| Author       | [facility]  |
|              | and [place]                                |
+--------------+--------------------------------------------+
| Organization | [facility]  |
|              | and [place]                                |
+--------------+--------------------------------------------+
| Address      | Unknown                                    |
+--------------+--------------------------------------------+
| Phone        | Unavailable                                |
+--------------+--------------------------------------------+
 
 
 
 Support
 
 
+----------------+--------------+---------------------+-----------------+
| Name           | Relationship | Address             | Phone           |
+----------------+--------------+---------------------+-----------------+
| Ada/Ed Radhames | ECON         | [address]              | [phone] |
|                |              | [place], [place]  |                 |
|                |              |  [address]              |                 |
+----------------+--------------+---------------------+-----------------+
 
 
 
 
 Care Team Providers
 
 
+-----------------------+------+-------------+
| Care Team Member Name | Role | Phone       |
+-----------------------+------+-------------+
 PCP  | Unavailable |
+-----------------------+------+-------------+
 
 
 
 Encounter Details
 
 
+--------+-----------+----------------------+-----------+-------------+
| Date   | Type      | Department           | Care Team | Description |
+--------+-----------+----------------------+-----------+-------------+
| 03/15/ | Hospital  |   [facility] |           |             |
|  - | Encounter |  MED CTR GENERIC IP  |           |             |
|        |           | CONV DEPT  [address]     |           |             |
| / |           [address]  [place], |           |             |
|    |           |  [place] [address]       |           |             |
|        |           | [phone]         |           |             |
+--------+-----------+----------------------+-----------+-------------+
 
 
 
 Social History
 
 
+----------------+-------+-----------+--------+------+
| Tobacco Use    | Types | Packs/Day | Years  | Date |
|                |       |           | Used   |      |
+----------------+-------+-----------+--------+------+
| Never Assessed |       |           |        |      |
+----------------+-------+-----------+--------+------+
 
 
 
+------------------+---------------+
| Sex Assigned at  | Date Recorded |
| Birth            |               |
+------------------+---------------+
| Not on file      |               |
+------------------+---------------+
 
 
 
+----------------+-------------+-------------+
| Job Start Date | Occupation  | Industry    |
+----------------+-------------+-------------+
| Not on file    | Not on file | Not on file |
+----------------+-------------+-------------+
 
 
 
+----------------+--------------+------------+
| Travel History | Travel Start | Travel End |
+----------------+--------------+------------+
 
 
 
 
+-------------------------------------+
| No recent travel history available. |
+-------------------------------------+
 documented as of this encounter
 
 Plan of Treatment
 
 
+--------+-----------+------------+----------------------+-------------------+
| Date   | Type      | Specialty  | Care Team            | Description       |
+--------+-----------+------------+----------------------+-------------------+
| / | Office    | Cardiology |   Tonia Wilson,    |                   |
|    | Visit     |            | ARNJESSICA  [address]   |                   |
|        |           |            | St  [place]  |                   |
|        |           |            | [address]  [phone]  |                   |
|        |           |            |  [phone] (Fax)  |                   |
+--------+-----------+------------+----------------------+-------------------+
| / | Hospital  | Radiology  |   Popeye Townsend, |                   |
|    | Encounter |            |  MD  [address] |                   |
|        |           |            |    [place],   |                   |
|        |           |            | [place] [address]             |                   |
|        |           |            | [phone]         |                   |
|        |           |            | [phone] (Fax)   |                   |
+--------+-----------+------------+----------------------+-------------------+
| / | Surgery   | Radiology  |   Popeye Townsend, | CV EP PPM SYSTEM  |
|    |           |            |  MD  [address] | IMPLANT           |
|        |           |            |  [place]ikkie  [place],   |                   |
|        |           |            | [place] [address]             |                   |
|        |           |            | [phone]         |                   |
|        |           |            | [phone] (Fax)   |                   |
+--------+-----------+------------+----------------------+-------------------+
| 02/10/ | Clinical  | Cardiology |                      |                   |
|    | Support   |            |                      |                   |
+--------+-----------+------------+----------------------+-------------------+
| / | Office    | Cardiology |   CliffordalfredoTonia,    |                   |
|    | Visit     |            | BELKIS  [address]   |                   |
|        |           |            | [place]  |                   |
|        |           |            | [address]  [phone]  |                   |
|        |           |            |  [phone] (Fax)  |                   |
+--------+-----------+------------+----------------------+-------------------+
| / | Off-Site  | Nephrology |   Marzena Moser  |                   |
|    | Visit     |            | DO ETIENNE  [address] [place] [address]      |                   |
|        |           |            | [address]      |                   |
|        |           |            | [place]      |                   |
|        |           |            | [address]  [phone]  |                   |
|        |           |            |  [phone] (Fax)  |                   |
+--------+-----------+------------+----------------------+-------------------+
 documented as of this encounter
 
 Visit Diagnoses
 Not on filedocumented in this encounter

## 2020-01-08 NOTE — XMS
Encounter Summary
  Created on: 2020
 
 Viviana Ricci
 External Reference #: [id number]
 : 47
 Sex: Female
 
 Demographics
 
 
+-----------------------+----------------------+
| Address               | [address]      |
|                       | [place]  [address] |
+-----------------------+----------------------+
| Home Phone            | [phone]      |
+-----------------------+----------------------+
| Preferred Language    | Unknown              |
+-----------------------+----------------------+
| Marital Status        | Single               |
+-----------------------+----------------------+
| Presybeterian Affiliation | [id number]                 |
+-----------------------+----------------------+
| Race                  | Unknown              |
+-----------------------+----------------------+
| Ethnic Group          | Unknown              |
+-----------------------+----------------------+
 
 
 Author
 
 
+--------------+--------------------------------------------+
| Author       | [facility]  |
|              | and [place]                                |
+--------------+--------------------------------------------+
| Organization | [facility]  |
|              | and [place]                                |
+--------------+--------------------------------------------+
| Address      | Unknown                                    |
+--------------+--------------------------------------------+
| Phone        | Unavailable                                |
+--------------+--------------------------------------------+
 
 
 
 Support
 
 
+----------------+--------------+---------------------+-----------------+
| Name           | Relationship | Address             | Phone           |
+----------------+--------------+---------------------+-----------------+
| Ada/Ed Radhames | ECON         | [address]              | [phone] |
|                |              | [place]  |                 |
|                |              |  [address]              |                 |
+----------------+--------------+---------------------+-----------------+
 
 
 
 
 Care Team Providers
 
 
+-----------------------+------+-------------+
| Care Team Member Name | Role | Phone       |
+-----------------------+------+-------------+
 PCP  | Unavailable |
+-----------------------+------+-------------+
 
 
 
 Encounter Details
 
 
+--------+-----------+----------------------+-----------+-------------+
| Date   | Type      | Department           | Care Team | Description |
+--------+-----------+----------------------+-----------+-------------+
| 10/26/ | Hospital  |   [facility] |           |             |
|    | Encounter |  MED CTR MP INTRA OP |           |             |
|        |           |   [address]       |           |             |
|        |           | [place]      |           |             |
|        |           | [address]           |           |             |
|        |           | [phone]         |           |             |
+--------+-----------+----------------------+-----------+-------------+
 
 
 
 Social History
 
 
+----------------+-------+-----------+--------+------+
| Tobacco Use    | Types | Packs/Day | Years  | Date |
|                |       |           | Used   |      |
+----------------+-------+-----------+--------+------+
| Never Assessed |       |           |        |      |
+----------------+-------+-----------+--------+------+
 
 
 
+------------------+---------------+
| Sex Assigned at  | Date Recorded |
| Birth            |               |
+------------------+---------------+
| Not on file      |               |
+------------------+---------------+
 
 
 
+----------------+-------------+-------------+
| Job Start Date | Occupation  | Industry    |
+----------------+-------------+-------------+
| Not on file    | Not on file | Not on file |
+----------------+-------------+-------------+
 
 
 
+----------------+--------------+------------+
| Travel History | Travel Start | Travel End |
+----------------+--------------+------------+
 
 
 
 
+-------------------------------------+
| No recent travel history available. |
+-------------------------------------+
 documented as of this encounter
 
 Plan of Treatment
 
 
+--------+-----------+------------+----------------------+-------------------+
| Date   | Type      | Specialty  | Care Team            | Description       |
+--------+-----------+------------+----------------------+-------------------+
| / | Office    | Cardiology |   Tonia Wilson,    |                   |
|    | Visit     |            | ARNJESSICA  [address]   |                   |
|        |           |            | St  [place] [facility], [place]  |                   |
|        |           |            | [address]  [phone]  |                   |
|        |           |            |  [phone] (Fax)  |                   |
+--------+-----------+------------+----------------------+-------------------+
| / | Hospital  | Radiology  |   Popeye Townsend, |                   |
|    | Encounter |            |  MD  [address] |                   |
|        |           |            |  St.  [place],   |                   |
|        |           |            | [place] [address]             |                   |
|        |           |            | [phone]         |                   |
|        |           |            | [phone] (Fax)   |                   |
+--------+-----------+------------+----------------------+-------------------+
| / | Surgery   | Radiology  |   Popeye Townsend, | CV EP PPM SYSTEM  |
|    |           |            |  MD  [address] | IMPLANT           |
|        |           |            |  St.  [place],   |                   |
|        |           |            | [place] [address]             |                   |
|        |           |            | [phone]         |                   |
|        |           |            | [phone] (Fax)   |                   |
+--------+-----------+------------+----------------------+-------------------+
| 02/10/ | Clinical  | Cardiology |                      |                   |
|    | Support   |            |                      |                   |
+--------+-----------+------------+----------------------+-------------------+
| / | Office    | Cardiology |   Tonia Wilson,    |                   |
|    | Visit     |            | [facility]  [address]   |                   |
|        |           |            | [place]  |                   |
|        |           |            | [address]  [phone]  |                   |
|        |           |            |  [phone] (Fax)  |                   |
+--------+-----------+------------+----------------------+-------------------+
| / | Off-Site  | Nephrology |   Marzena Moser  |                   |
|    | Visit     |            | DO ETIENNE  [address] [place] [address]      |                   |
|        |           |            | [address]      |                   |
|        |           |            | [place]      |                   |
|        |           |            | [address] [phone]  |                   |
|        |           |            |  [phone] (Fax)  |                   |
+--------+-----------+------------+----------------------+-------------------+
 documented as of this encounter
 
 Visit Diagnoses
 Not on filedocumented in this encounter

## 2020-01-08 NOTE — XMS
Encounter Summary
  Created on: 2020
 
 Viviana Ricci
 External Reference #: [id number]
 : 47
 Sex: Female
 
 Demographics
 
 
+-----------------------+----------------------+
| Address               | [address]      |
|                       | [place]  [address] |
+-----------------------+----------------------+
| Home Phone            | [phone]      |
+-----------------------+----------------------+
| Preferred Language    | Unknown              |
+-----------------------+----------------------+
| Marital Status        | Single               |
+-----------------------+----------------------+
| Roman Catholic Affiliation | 1009                 |
+-----------------------+----------------------+
| Race                  | Unknown              |
+-----------------------+----------------------+
| Ethnic Group          | Unknown              |
+-----------------------+----------------------+
 
 
 Author
 
 
+--------------+--------------------------------------------+
| Author       | [facility]  |
|              | and [place]                                |
+--------------+--------------------------------------------+
| Organization | [facility]  |
|              | and [place]                                |
+--------------+--------------------------------------------+
| Address      | Unknown                                    |
+--------------+--------------------------------------------+
| Phone        | Unavailable                                |
+--------------+--------------------------------------------+
 
 
 
 Support
 
 
+----------------+--------------+---------------------+-----------------+
| Name           | Relationship | Address             | Phone           |
+----------------+--------------+---------------------+-----------------+
| Ada/Ed Radhames | ECON         | [address]              | [phone] |
|                |              | [place]  |                 |
|                |              |  [address]              |                 |
+----------------+--------------+---------------------+-----------------+
 
 
 
 
 Care Team Providers
 
 
+------------------------+------+-----------------+
| Care Team Member Name  | Role | Phone           |
+------------------------+------+-----------------+
| Marzena Moser DO | PCP  | [phone] |
+------------------------+------+-----------------+
 
 
 
 Reason for Visit
 
 
+-------------------+----------+
| Reason            | Comments |
+-------------------+----------+
| Medication Refill |          |
+-------------------+----------+
 
 
 
 Encounter Details
 
 
+--------+--------+---------------------+----------------------+-------------------+
| Date   | Type   | Department          | Care Team            | Description       |
+--------+--------+---------------------+----------------------+-------------------+
| / | Refill |   [facility]         |   Marzena Moser  | Medication Refill |
| 2017   |        | NEPHROLOGY  [address]   | M, DO  [address]      |                   |
|        |        | [address]   | [address]      |                   |
|        |        | [place], [place]     | [place], [place]      |                   |
|        |        | [address]          | [address]  [phone]  |                   |
|        |        | [phone]        |  [phone] (Fax)  |                   |
+--------+--------+---------------------+----------------------+-------------------+
 
 
 
 Social History
 
 
+--------------+-------+-----------+--------+------+
| Tobacco Use  | Types | Packs/Day | Years  | Date |
|              |       |           | Used   |      |
+--------------+-------+-----------+--------+------+
| Never Smoker |       |           |        |      |
+--------------+-------+-----------+--------+------+
 
 
 
+---------------------+---+---+---+
| Smokeless Tobacco:  |   |   |   |
| Never Used          |   |   |   |
+---------------------+---+---+---+
 
 
 
+---------------------------------------------------------------+
| Comments: some second hand smoke exposure, but fairly minimal |
 
+---------------------------------------------------------------+
 
 
 
+-------------+-------------+---------+----------+
| Alcohol Use | Drinks/Week | oz/Week | Comments |
+-------------+-------------+---------+----------+
| No          |             |         |          |
+-------------+-------------+---------+----------+
 
 
 
+------------------+---------------+
| Sex Assigned at  | Date Recorded |
| Birth            |               |
+------------------+---------------+
| Not on file      |               |
+------------------+---------------+
 
 
 
+----------------+-------------+-------------+
| Job Start Date | Occupation  | Industry    |
+----------------+-------------+-------------+
| Not on file    | Not on file | Not on file |
+----------------+-------------+-------------+
 
 
 
+----------------+--------------+------------+
| Travel History | Travel Start | Travel End |
+----------------+--------------+------------+
 
 
 
+-------------------------------------+
| No recent travel history available. |
+-------------------------------------+
 documented as of this encounter
 
 Plan of Treatment
 
 
+--------+-----------+------------+----------------------+-------------------+
| Date   | Type      | Specialty  | Care Team            | Description       |
+--------+-----------+------------+----------------------+-------------------+
| / | Office    | Cardiology |   Tonia Wilson,    |                   |
|    | Visit     |            | ARNP  [address]   |                   |
|        |           |            | St [place], [place]  |                   |
|        |           |            | [address]  [phone]  |                   |
|        |           |            |  [phone] (Fax)  |                   |
+--------+-----------+------------+----------------------+-------------------+
| / | Hospital  | Radiology  |   Popeye Townsend, |                   |
|    | Encounter |            |  MD  [address] |                   |
|        |           |            |  [place],   |                   |
|        |           |            | [place] [address]             |                   |
|        |           |            | [phone]         |                   |
|        |           |            | [phone] (Fax)   |                   |
+--------+-----------+------------+----------------------+-------------------+
| / | Surgery   | Radiology  |   Popeye Townsend, | CV EP PPM SYSTEM  |
 
|    |           |            |  MD  [address] | IMPLANT           |
|        |           |            |  [place],   |                   |
|        |           |            | [place] [address]             |                   |
|        |           |            | [phone]         |                   |
|        |           |            | [phone] (Fax)   |                   |
+--------+-----------+------------+----------------------+-------------------+
| 02/10/ | Clinical  | Cardiology |                      |                   |
|    | Support   |            |                      |                   |
+--------+-----------+------------+----------------------+-------------------+
| / | Office    | Cardiology |   RakeshTonia andre,    |                   |
|    | Visit     |            | ARNP  [address]   |                   |
|        |           |            | [place]  |                   |
|        |           |            | [address] [phone]  |                   |
|        |           |            |  [phone] (Fax)  |                   |
+--------+-----------+------------+----------------------+-------------------+
| / | Off-Site  | Nephrology |   Marzena Moser  |                   |
|    | Visit     |            | DO ETIENNE  [address] [place] [address]      |                   |
|        |           |            | [address]      |                   |
|        |           |            | [place]      |                   |
|        |           |            | [address] [phone]  |                   |
|        |           |            |  [phone] (Fax)  |                   |
+--------+-----------+------------+----------------------+-------------------+
 documented as of this encounter
 
 Visit Diagnoses
 Not on filedocumented in this encounter

## 2020-01-08 NOTE — XMS
Encounter Summary
  Created on: 2020
 
 Viviana Ricci
 External Reference #: [id number]
 : 47
 Sex: Female
 
 Demographics
 
 
+-----------------------+----------------------+
| Address               | [address]      |
|                       | [place]  [address] |
+-----------------------+----------------------+
| Home Phone            | [phone]      |
+-----------------------+----------------------+
| Preferred Language    | Unknown              |
+-----------------------+----------------------+
| Marital Status        | Single               |
+-----------------------+----------------------+
| [employer] Affiliation | 1009                 |
+-----------------------+----------------------+
| Race                  | Unknown              |
+-----------------------+----------------------+
| Ethnic Group          | Unknown              |
+-----------------------+----------------------+
 
 
 Author
 
 
+--------------+--------------------------------------------+
| Author       | [facility]  |
|              | and [place]                                |
+--------------+--------------------------------------------+
| Organization | [facility] [place]  |
|              | and [place]                                |
+--------------+--------------------------------------------+
| Address      | Unknown                                    |
+--------------+--------------------------------------------+
| Phone        | Unavailable                                |
+--------------+--------------------------------------------+
 
 
 
 Support
 
 
+----------------+--------------+---------------------+-----------------+
| Name           | Relationship | Address             | Phone           |
+----------------+--------------+---------------------+-----------------+
| Ada/Ed Radhames | ECON         | [address]              | [phone] |
|                |              | [place]  |                 |
|                |              |  [address]              |                 |
+----------------+--------------+---------------------+-----------------+
 
 
 
 
 Care Team Providers
 
 
+-----------------------+------+-------------+
| Care Team Member Name | Role | Phone       |
+-----------------------+------+-------------+
 PCP  | Unavailable |
+-----------------------+------+-------------+
 
 
 
 Encounter Details
 
 
+--------+----------+---------------------+----------------------+-------------+
| Date   | Type     | Department          | Care Team            | Description |
+--------+----------+---------------------+----------------------+-------------+
| / | Abstract |   [facility]  [place]         |   Marzena Moser  |             |
|    |          | NEPHROLOGY  [address]   | M, DO  [address]      |             |
|        |          | [address]   | [address]      |             |
|        |          | [place], [place]     | [place]      |             |
|        |          | [address]          | [address]  [phone]  |             |
|        |          | [phone]        |  [phone] (Fax)  |             |
+--------+----------+---------------------+----------------------+-------------+
 
 
 
 Social History
 
 
+--------------+-------+-----------+--------+------+
| Tobacco Use  | Types | Packs/Day | Years  | Date |
|              |       |           | Used   |      |
+--------------+-------+-----------+--------+------+
| Never Smoker |       |           |        |      |
+--------------+-------+-----------+--------+------+
 
 
 
+---------------------+---+---+---+
| Smokeless Tobacco:  |   |   |   |
| Never Used          |   |   |   |
+---------------------+---+---+---+
 
 
 
+---------------------------------------------------------------+
| Comments: some second hand smoke exposure, but fairly minimal |
+---------------------------------------------------------------+
 
 
 
+-------------+-------------+---------+----------+
| Alcohol Use | Drinks/Week | oz/Week | Comments |
+-------------+-------------+---------+----------+
| No          |             |         |          |
+-------------+-------------+---------+----------+
 
 
 
 
+------------------+---------------+
| Sex Assigned at  | Date Recorded |
| Birth            |               |
+------------------+---------------+
| Not on file      |               |
+------------------+---------------+
 
 
 
+----------------+-------------+-------------+
| Job Start Date | Occupation  | Industry    |
+----------------+-------------+-------------+
| Not on file    | Not on file | Not on file |
+----------------+-------------+-------------+
 
 
 
+----------------+--------------+------------+
| Travel History | Travel Start | Travel End |
+----------------+--------------+------------+
 
 
 
+-------------------------------------+
| No recent travel history available. |
+-------------------------------------+
 documented as of this encounter
 
 Plan of Treatment
 
 
+--------+-----------+------------+----------------------+-------------------+
| Date   | Type      | Specialty  | Care Team            | Description       |
+--------+-----------+------------+----------------------+-------------------+
| / | Office    | Cardiology |   Tonia Wilson,    |                   |
|    | Visit     |            | ARNJESSICA  [address]   |                   |
|        |           |            | [place]  |                   |
|        |           |            | [address]  [phone]  |                   |
|        |           |            |  [phone] (Fax)  |                   |
+--------+-----------+------------+----------------------+-------------------+
| / | Hospital  | Radiology  |   Popeye Townsend, |                   |
|    | Encounter |            |  MD  [address] |                   |
|        |           |            |  St.  [place],   |                   |
|        |           |            | [place] [address]             |                   |
|        |           |            | [phone]         |                   |
|        |           |            | [phone] (Fax)   |                   |
+--------+-----------+------------+----------------------+-------------------+
| / | Surgery   | Radiology  |   Popeye Townsend, | CV EP PPM SYSTEM  |
|    |           |            |  MD  [address] | IMPLANT           |
|        |           |            |  St.  [place],   |                   |
|        |           |            | [place] [address]             |                   |
|        |           |            | [phone]         |                   |
|        |           |            | [phone] (Fax)   |                   |
+--------+-----------+------------+----------------------+-------------------+
| 02/10/ | Clinical  | Cardiology |                      |                   |
|    | Support   |            |                      |                   |
+--------+-----------+------------+----------------------+-------------------+
| / | Office    | Cardiology |   Tonia Wilson,    |                   |
|    | Visit     |            | ARNP  [address]   |                   |
 
|        |           |            | St  [place], [place]  |                   |
|        |           |            | [address]  [phone]  |                   |
|        |           |            |  [phone] (Fax)  |                   |
+--------+-----------+------------+----------------------+-------------------+
| / | Off-Site  | Nephrology |   Marzena Moser  |                   |
| 2020   | Visit     |            | M,   [address] [place] [address]      |                   |
|        |           |            | [address]      |                   |
|        |           |            | [place]      |                   |
|        |           |            | [address]  [phone]  |                   |
|        |           |            |  [phone] (Fax)  |                   |
+--------+-----------+------------+----------------------+-------------------+
 documented as of this encounter
 
 Procedures
 
 
+----------------------+--------+------------+----------------------+----------------------+
| Procedure Name       | Priori | Date/Time  | Associated Diagnosis | Comments             |
|                      | ty     |            |                      |                      |
+----------------------+--------+------------+----------------------+----------------------+
| CMP14+LP+CBC/D/PLT+T | Routin | 2013 |                      |   Results for this   |
| SH+UA/M (NON ORD)    | e      |            |                      | procedure are in the |
|                      |        |            |                      |  results section.    |
+----------------------+--------+------------+----------------------+----------------------+
 documented in this encounter
 
 Results
 CMP14+LP+CBC/D/Plt+TSH+UA/M (2013)
 
+-------------+-----------+-----------------+------------+--------------+
| Component   | Value     | Ref Range       | Performed  | Pathologist  |
|             |           |                 | At         | Signature    |
+-------------+-----------+-----------------+------------+--------------+
| Na          | 140       | mmol/L          |            |              |
+-------------+-----------+-----------------+------------+--------------+
| K           | 5.0       | mmol/L          |            |              |
+-------------+-----------+-----------------+------------+--------------+
| Cl          | 111       | mmol/L          |            |              |
+-------------+-----------+-----------------+------------+--------------+
| CO2         | 17        | mmol/L          |            |              |
+-------------+-----------+-----------------+------------+--------------+
| BUN         | 69        | mg/dL           |            |              |
+-------------+-----------+-----------------+------------+--------------+
| CREA        | 1.9       | mg/dL           |            |              |
+-------------+-----------+-----------------+------------+--------------+
| Glucose     | 137       | mg/dL           |            |              |
+-------------+-----------+-----------------+------------+--------------+
| Calcium     | 9.1       | mg/dL           |            |              |
+-------------+-----------+-----------------+------------+--------------+
| Hemoglobin  | 8.2       | %               |            |              |
| A1c         |           |                 |            |              |
+-------------+-----------+-----------------+------------+--------------+
| Phosphorus  | 4.5 (A)   | 2.6 - 4.4 mg/dL |            |              |
+-------------+-----------+-----------------+------------+--------------+
| Magnesium   | 2.0       | mg/dL           |            |              |
+-------------+-----------+-----------------+------------+--------------+
| Cholesterol | 162       | mg/dL           |            |              |
+-------------+-----------+-----------------+------------+--------------+
| HDL         | 45        | mg/dL           |            |              |
+-------------+-----------+-----------------+------------+--------------+
 
| LDL         | 72        |                 |            |              |
| Cholesterol |           |                 |            |              |
+-------------+-----------+-----------------+------------+--------------+
| Triglycerid | 225       |                 |            |              |
| es          |           |                 |            |              |
+-------------+-----------+-----------------+------------+--------------+
| WBC         | 4.2       | K/uL            |            |              |
+-------------+-----------+-----------------+------------+--------------+
| Hemoglobin  | 12.5      | 11.3 - 15.5     |            |              |
|             |           | g/dL            |            |              |
+-------------+-----------+-----------------+------------+--------------+
| Hematocrit  | 37.0      | 34.0 - 46.0 %   |            |              |
+-------------+-----------+-----------------+------------+--------------+
| Platelet    | 170       | 150 - 400 K/uL  |            |              |
| Count       |           |                 |            |              |
+-------------+-----------+-----------------+------------+--------------+
| MCV         | 101.1 (A) | 80.0 - 98.0 fL  |            |              |
+-------------+-----------+-----------------+------------+--------------+
| Bilirubin   | 0.5       | 0.1 - 1.5 mg/dL |            |              |
| Total       |           |                 |            |              |
+-------------+-----------+-----------------+------------+--------------+
| AST         | 19        | 10 - 45 U/L     |            |              |
+-------------+-----------+-----------------+------------+--------------+
| ALT         | 12        | U/L             |            |              |
+-------------+-----------+-----------------+------------+--------------+
| Alkaline    | 85        | 35 - 115 U/L    |            |              |
| Phosphatase |           |                 |            |              |
+-------------+-----------+-----------------+------------+--------------+
| Albumin     | 3.7       | 3.3 - 4.8 g/dL  |            |              |
+-------------+-----------+-----------------+------------+--------------+
| PTH INTACT  | 350.8     | pg/mL           |            |              |
+-------------+-----------+-----------------+------------+--------------+
| Estimated   | 27.0      | mL/min/1.73m2   |            |              |
| GFR         |           |                 |            |              |
+-------------+-----------+-----------------+------------+--------------+
| Tacrolimus  | 5.7       |                 |            |              |
| Level       |           |                 |            |              |
+-------------+-----------+-----------------+------------+--------------+
 
 
 
+----------+
| Specimen |
+----------+
|          |
+----------+
 documented in this encounter
 
 Visit Diagnoses
 Not on filedocumented in this encounter

## 2020-01-08 NOTE — XMS
Encounter Summary
  Created on: 2020
 
 Viviana Ricci
 External Reference #: [id number]
 : 47
 Sex: Female
 
 Demographics
 
 
+-----------------------+----------------------+
| Address               | [address]      |
|                       | [place]  [address] |
+-----------------------+----------------------+
| Home Phone            | [phone]      |
+-----------------------+----------------------+
| Preferred Language    | Unknown              |
+-----------------------+----------------------+
| Marital Status        | Single               |
+-----------------------+----------------------+
| [employer] Affiliation | 1009                 |
+-----------------------+----------------------+
| Race                  | Unknown              |
+-----------------------+----------------------+
| Ethnic Group          | Unknown              |
+-----------------------+----------------------+
 
 
 Author
 
 
+--------------+--------------------------------------------+
| Author       | [facility] [place]  |
|              | and [place]                                |
+--------------+--------------------------------------------+
| Organization | [facility] [place]  |
|              | and [place]                                |
+--------------+--------------------------------------------+
| Address      | Unknown                                    |
+--------------+--------------------------------------------+
| Phone        | Unavailable                                |
+--------------+--------------------------------------------+
 
 
 
 Support
 
 
+----------------+--------------+---------------------+-----------------+
| Name           | Relationship | Address             | Phone           |
+----------------+--------------+---------------------+-----------------+
| Ada/Ed Radhames | ECON         | [address]              | [phone] |
|                |              | [place]  |                 |
|                |              |  [address]              |                 |
+----------------+--------------+---------------------+-----------------+
 
 
 
 
 Care Team Providers
 
 
+-----------------------+------+-------------+
| Care Team Member Name | Role | Phone       |
+-----------------------+------+-------------+
 PCP  | Unavailable |
+-----------------------+------+-------------+
 
 
 
 Encounter Details
 
 
+--------+-------------+---------------------+----------------------+-------------+
| Date   | Type        | Department          | Care Team            | Description |
+--------+-------------+---------------------+----------------------+-------------+
| / | Orders Only |   [facility]  [place]         |   Marzena Moser  |             |
|    |             | NEPHROLOGY  [address]   | M, DO  [address]      |             |
|        |             | [address]   | [address]      |             |
|        |             | [place]     | [place]      |             |
|        |             | [address]          | [address]  [phone]  |             |
|        |             | [phone]        |  [phone] (Fax)  |             |
+--------+-------------+---------------------+----------------------+-------------+
 
 
 
 Social History
 
 
+--------------+-------+-----------+--------+------+
| Tobacco Use  | Types | Packs/Day | Years  | Date |
|              |       |           | Used   |      |
+--------------+-------+-----------+--------+------+
| Never Smoker |       |           |        |      |
+--------------+-------+-----------+--------+------+
 
 
 
+---------------------+---+---+---+
| Smokeless Tobacco:  |   |   |   |
| Never Used          |   |   |   |
+---------------------+---+---+---+
 
 
 
+---------------------------------------------------------------+
| Comments: some second hand smoke exposure, but fairly minimal |
+---------------------------------------------------------------+
 
 
 
+-------------+-------------+---------+----------+
| Alcohol Use | Drinks/Week | oz/Week | Comments |
+-------------+-------------+---------+----------+
| No          |             |         |          |
+-------------+-------------+---------+----------+
 
 
 
 
+------------------+---------------+
| Sex Assigned at  | Date Recorded |
| Birth            |               |
+------------------+---------------+
| Not on file      |               |
+------------------+---------------+
 
 
 
+----------------+-------------+-------------+
| Job Start Date | Occupation  | Industry    |
+----------------+-------------+-------------+
| Not on file    | Not on file | Not on file |
+----------------+-------------+-------------+
 
 
 
+----------------+--------------+------------+
| Travel History | Travel Start | Travel End |
+----------------+--------------+------------+
 
 
 
+-------------------------------------+
| No recent travel history available. |
+-------------------------------------+
 documented as of this encounter
 
 Progress Notes
 Bobbi Hanson RN - 2014  3:47 PM PDTPer nuzhat Pryor to refer patient to [facility] in [place] for pain management. [facility] is booking out u
ntil . Patient was called an informed of referral and when they would be able to 
see her. Patient stated she would not be able to wait that long. Patient was informed she co
uld report to the ED or urgent care to be evaluated. Patient stated she would contact a pain
 clinic in [facility] on her own. Electronically signed by Bobbi Hanson RN at 2014
  3:58 PM PDTdocumented in this encounter
 
 Plan of Treatment
 
 
+--------+-----------+------------+----------------------+-------------------+
| Date   | Type      | Specialty  | Care Team            | Description       |
+--------+-----------+------------+----------------------+-------------------+
| / | Office    | Cardiology |   Tonia Wilson,    |                   |
|    | Visit     |            | ARNJESSICA  [address]   |                   |
|        |           |            | St [place], [place]  |                   |
|        |           |            | [address]  [phone]  |                   |
|        |           |            |  [phone] (Fax)  |                   |
+--------+-----------+------------+----------------------+-------------------+
| / | Hospital  | Radiology  |   Popeye Townsend, |                   |
|    | Encounter |            |  MD  [address] |                   |
|        |           |            |  [place],   |                   |
|        |           |            | [place] [address]             |                   |
|        |           |            | [phone]         |                   |
|        |           |            | [phone] (Fax)   |                   |
+--------+-----------+------------+----------------------+-------------------+
| / | Surgery   | Radiology  |   Popeye Townsend, | CV EP PPM SYSTEM  |
|    |           |            |  MD  [address] | IMPLANT           |
|        |           |            |  St.  [place],   |                   |
 
|        |           |            | [place] [address]             |                   |
|        |           |            | [phone]         |                   |
|        |           |            | [phone] (Fax)   |                   |
+--------+-----------+------------+----------------------+-------------------+
| 02/10/ | Clinical  | Cardiology |                      |                   |
|    | Support   |            |                      |                   |
+--------+-----------+------------+----------------------+-------------------+
| / | Office    | Cardiology |   Tonia Wilson,    |                   |
|    | Visit     |            | ARNP  [address]   |                   |
|        |           |            | [place]  |                   |
|        |           |            | [address]  [phone]  |                   |
|        |           |            |  [phone] (Fax)  |                   |
+--------+-----------+------------+----------------------+-------------------+
| / | Off-Site  | Nephrology |   Marzena Moser  |                   |
|    | Visit     |            | DO ETIENNE  [address] [place] [address]      |                   |
|        |           |            | [address]      |                   |
|        |           |            | [place]      |                   |
|        |           |            | [id number] [phone]  |                   |
|        |           |            |  [phone] (Fax)  |                   |
+--------+-----------+------------+----------------------+-------------------+
 documented as of this encounter
 
 Visit Diagnoses
 Not on filedocumented in this encounter

## 2020-01-08 NOTE — XMS
Encounter Summary
  Created on: 2020
 
 Viviana Ricci
 External Reference #: [id number]
 : 47
 Sex: Female
 
 Demographics
 
 
+-----------------------+----------------------+
| Address               | [address]      |
|                       | [place]  [address] |
+-----------------------+----------------------+
| Home Phone            | [phone]      |
+-----------------------+----------------------+
| Preferred Language    | Unknown              |
+-----------------------+----------------------+
| Marital Status        | Single               |
+-----------------------+----------------------+
| Christianity Affiliation | [id number]                 |
+-----------------------+----------------------+
| Race                  | Unknown              |
+-----------------------+----------------------+
| Ethnic Group          | Unknown              |
+-----------------------+----------------------+
 
 
 Author
 
 
+--------------+--------------------------------------------+
| Author       | [facility]  |
|              | and [facility]                                |
+--------------+--------------------------------------------+
| Organization | [facility]  |
|              | and [facility]                                |
+--------------+--------------------------------------------+
| Address      | Unknown                                    |
+--------------+--------------------------------------------+
| Phone        | Unavailable                                |
+--------------+--------------------------------------------+
 
 
 
 Support
 
 
+----------------+--------------+---------------------+-----------------+
| Name           | Relationship | Address             | Phone           |
+----------------+--------------+---------------------+-----------------+
| Ada/Ed Radhames | ECON         | [address]              | [phone] |
|                |              | [place], [place]  |                 |
|                |              |  [id number]              |                 |
+----------------+--------------+---------------------+-----------------+
 
 
 
 
 Care Team Providers
 
 
+-----------------------+------+-------------+
| Care Team Member Name | Role | Phone       |
+-----------------------+------+-------------+
 PCP  | Unavailable |
+-----------------------+------+-------------+
 
 
 
 Reason for Visit
 Evaluate & Treat (Routine)
 
+--------+--------+------------+--------------+--------------+---------------+
| Status | Reason | Specialty  | Diagnoses /  | Referred By  | Referred To   |
|        |        |            | Procedures   | Contact      | Contact       |
+--------+--------+------------+--------------+--------------+---------------+
| Closed |        | Nephrology |   Diagnoses  |   Stroemel,  |   Stroemel,   |
|        |        |            |              | Marzena CLIFTON,   | Marzena CLIFTON DO |
|        |        |            | Complication | DO  [address] |   [address]    |
|        |        |            | s of         |  [place] [address] | [place] [address]   |
|        |        |            | transplanted |  [address]  [place]  | [address]  [place]    |
|        |        |            |  kidney      | [place], [place]    | [place]     |
|        |        |            | Unspecified  | [address]        | [address]  Phone: |
|        |        |            | hypertensive | Phone:       |  [phone] |
|        |        |            |  kidney      | [phone] |   Fax:        |
|        |        |            | disease with |   Fax:       | [phone]  |
|        |        |            |  chronic     | [phone] |               |
|        |        |            | kidney       |              |               |
|        |        |            | disease      |              |               |
|        |        |            | stage I      |              |               |
|        |        |            | through      |              |               |
|        |        |            | stage IV, or |              |               |
|        |        |            |              |              |               |
|        |        |            | unspecified( |              |               |
|        |        |            | 403.90)      |              |               |
|        |        |            | Chronic      |              |               |
|        |        |            | glomerulonep |              |               |
|        |        |            | hritis with  |              |               |
|        |        |            | lesion of    |              |               |
|        |        |            | membranous   |              |               |
|        |        |            | glomerulonep |              |               |
|        |        |            | hritis       |              |               |
|        |        |            | Unspecified  |              |               |
|        |        |            | essential    |              |               |
|        |        |            | hypertension |              |               |
|        |        |            |   Procedures |              |               |
|        |        |            |   AK OFFICE  |              |               |
|        |        |            | OUTPATIENT   |              |               |
|        |        |            | VISIT 25     |              |               |
|        |        |            | MINUTES      |              |               |
+--------+--------+------------+--------------+--------------+---------------+
 
 
 
 
 Encounter Details
 
 
 
+--------+-----------+---------------------+----------------------+----------------------+
| Date   | Type      | Department          | Care Team            | Description          |
+--------+-----------+---------------------+----------------------+----------------------+
| / | Off-Site  |   [facility]         |   Marzena Moser  | FSGS (focal          |
| 2015   | Visit     | NEPHROLOGY  [address]   | M, DO  [address]      | segmental            |
|        |           | [address]   | [address]      | glomerulosclerosis)  |
|        |           | [place], [place]     | [place]      | (Primary Dx);        |
|        |           | [address]          | [address]  [phone]  | Unspecified          |
|        |           | [phone]        |  [phone] (Fax)  | hypertensive kidney  |
|        |           |                     |                      | disease with chronic |
|        |           |                     |                      |  kidney disease      |
|        |           |                     |                      | stage I through      |
|        |           |                     |                      | stage IV, or         |
|        |           |                     |                      | unspecified;         |
|        |           |                     |                      | Diabetes mellitus    |
|        |           |                     |                      | type II,             |
|        |           |                     |                      | uncontrolled (HCC);  |
|        |           |                     |                      | Kidney replaced by   |
|        |           |                     |                      | transplant           |
+--------+-----------+---------------------+----------------------+----------------------+
 
 
 
 Social History
 
 
+--------------+-------+-----------+--------+------+
| Tobacco Use  | Types | Packs/Day | Years  | Date |
|              |       |           | Used   |      |
+--------------+-------+-----------+--------+------+
| Never Smoker |       |           |        |      |
+--------------+-------+-----------+--------+------+
 
 
 
+---------------------+---+---+---+
| Smokeless Tobacco:  |   |   |   |
| Never Used          |   |   |   |
+---------------------+---+---+---+
 
 
 
+---------------------------------------------------------------+
| Comments: some second hand smoke exposure, but fairly minimal |
+---------------------------------------------------------------+
 
 
 
+-------------+-------------+---------+----------+
| Alcohol Use | Drinks/Week | oz/Week | Comments |
+-------------+-------------+---------+----------+
| No          |             |         |          |
+-------------+-------------+---------+----------+
 
 
 
+------------------+---------------+
| Sex Assigned at  | Date Recorded |
| Birth            |               |
 
+------------------+---------------+
| Not on file      |               |
+------------------+---------------+
 
 
 
+----------------+-------------+-------------+
| Job Start Date | Occupation  | Industry    |
+----------------+-------------+-------------+
| Not on file    | Not on file | Not on file |
+----------------+-------------+-------------+
 
 
 
+----------------+--------------+------------+
| Travel History | Travel Start | Travel End |
+----------------+--------------+------------+
 
 
 
+-------------------------------------+
| No recent travel history available. |
+-------------------------------------+
 documented as of this encounter
 
 Last Filed Vital Signs
 
 
+-------------------+---------------------+----------------------+----------+
| Vital Sign        | Reading             | Time Taken           | Comments |
+-------------------+---------------------+----------------------+----------+
| Blood Pressure    | 140/80              | 2015  1:05 PM  |          |
|                   |                     | PST                  |          |
+-------------------+---------------------+----------------------+----------+
| Pulse             | -                   | -                    |          |
+-------------------+---------------------+----------------------+----------+
| Temperature       | 36.4   C (97.6   F) | 2015  1:05 PM  |          |
|                   |                     | PST                  |          |
+-------------------+---------------------+----------------------+----------+
| Respiratory Rate  | -                   | -                    |          |
+-------------------+---------------------+----------------------+----------+
| Oxygen Saturation | -                   | -                    |          |
+-------------------+---------------------+----------------------+----------+
| Inhaled Oxygen    | -                   | -                    |          |
| Concentration     |                     |                      |          |
+-------------------+---------------------+----------------------+----------+
| Weight            | 125.2 kg (276 lb)   | 2015  1:05 PM  |          |
|                   |                     | PST                  |          |
+-------------------+---------------------+----------------------+----------+
| Height            | -                   | -                    |          |
+-------------------+---------------------+----------------------+----------+
| Body Mass Index   | 44.55               | 2014 12:58 PM  |          |
|                   |                     | PST                  |          |
+-------------------+---------------------+----------------------+----------+
 documented in this encounter
 
 Progress Notes
 Marzena Moser DO - 2015 11:31 AM PSTFormatting of this note might be different
 from the original.
 Subjective:  NEPHROLOGY 
 
  
 Patient ID: Viviana Ricci is a 69 y.o. female.
 
 HPI Comments: 
 Followup for this 70 YO  white female s/p renal allograft, 05, with remote  allograft 
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
demia, SHPTH,  previous low back pain, obesity/JACK, chronic pulmonary  hypertension, histori
izabela related to centripetal obesity, and  CAD, s/p CABG x3 vessels,. 
 
 She is in very good spirits today.  She inquires about a "gastric sleeve" procedure to zachary duarte with her weight loss.  She had heavy pyuria last week on her UA, and the culture grew out
 >10-5th klebsiella.  She has not had chills or dysuria , however. 
 
 MEDS:
 
 Prograf 1.5 mg, BID
 Mycophenolate 250 mg, TID.
 Prednisone 5 mg, daily.
 Outpatient Prescriptions Marked as Taking for the 2/2/15 encounter (Off-Site Visit) with Maye Moser,  
 Medication Sig Dispense Refill 
   allopurinol (ZYLOPRIM) 100 mg tablet Take 1 tablet by mouth Daily.  30 tablet  11 
   aspirin 81 MG EC tablet Take 81 mg by mouth Daily.     
   cholecalciferol (VITAMIN D-3) 2000 UNITS TABS Take 5,000 Units by mouth Every other day
.     
   cinacalcet (SENSIPAR) 30 mg tablet Take 1 tablet by mouth Daily.  30 tablet  12 
   ciprofloxacin (CIPRO) 250 mg tablet Take 1 tablet by mouth 2 times daily for 7 days.  1
4 tablet  0 
   fludrocortisone (FLORINEF) 0.1 mg tablet Take 1 tablet by mouth Every other day.  45 ta
blet  2 
   fluticasone (FLOVENT HFA) 220 mcg/puff inhaler Inhale 1 puff into the lungs 2 times shante
ly. Rinse mouth after use.  1 Inhaler  11 
   furosemide (LASIX) 40 mg tablet Take 40 mg by mouth Daily as needed.     
   glucose blood VI test strips (ONE TOUCH ULTRA TEST) strip Check blood sugar before each
 meal and as directed  100 each  12 
   insulin glargine (LANTUS) 100 units/mL injection Inject 20 Units under the skin every m
orning.  10 mL  11 
   insulin lispro (HUMALOG) 100 units/mL injection Inject subcutaneously before meals acco
rding to sliding scale  10 vial  11 
   Insulin Syringe-Needle U-100 (BD INSULIN SYRINGE ULTRAFINE) 31G X 5/16" 0.5 ML MISC Use
 before meals and as directed.  100 each  11 
   levothyroxine (LEVOTHROID) 50 mcg tablet Take 1 tablet by mouth Daily.  30 tablet  11 
   lisinopril (PRINIVIL,ZESTRIL) 30 MG tablet Take 1 tablet by mouth Daily.  90 tablet  3 
   loperamide (ANTI-DIARRHEAL) 2 mg capsule Take 1 capsule by mouth 4 times daily as neede
d.  60 capsule  5 
   losartan (COZAAR) 50 mg tablet Take 1 tablet by mouth Daily.  90 tablet  4 
   magnesium oxide (MAG-OX) 400 mg tablet Take 1 tablet by mouth 2 times daily.  62 tablet
  12 
   metoprolol tartrate (LOPRESSOR) 25 mg tablet Take 1 tablet by mouth 2 times daily.  60 
tablet  11 
   omeprazole (PRILOSEC) 20 mg capsule Take one capsule by mouth once daily on an empty st
omach  90 capsule  3 
   Prenatal Multivit-Min-Fe-FA (PRENATAL VITAMINS) 0.8 MG TABS Take 0.8 mg by mouth Daily.
  30 each  11 
   Respiratory Therapy Supplies MISC Decrease CPAP to 12-18 cmH2O Diagnosis Code(s)327.23.
 Please send order to [facility].  1 each  0 
   rosuvastatin (CRESTOR) 20 mg tablet Take 1 tablet by mouth nightly.  30 tablet  11 
 
 No Known Allergies
 
 
 Objective:   Blood pressure 140/80, temperature 36.4 C (97.6 F), weight 125.193 kg (276
 lb).
 weight = refused.
  
 Physical Exam
 
 HEENT: No thrush.
 Heart: Regular rate and rhythm, with no S3, S4, murmur or rub.
 Lungs:   CTA bilaterally, no rales or wheezes.
 Abdomen:  Soft, obese, the renal allograft in RLQ is nontender, normoactive bowel sounds.
 Extremities:  no clubbing, cyanosis, (+) trace edema.  No foot ulcers.
 
 Lab Results 
 Component Value Date 
  NAEX 139 2015 
  KEX 5.1 2015 
  CLEX 112 2015 
  CO2EX 17 2015 
  CREEX 1.2 2015 
  EGFREX 45 2015 
  GLUEX 150 2015 
  PHOSEX 2.5 2015 
  MGEX 1.6 2015 
  PTHEX 187.8 2015 
  QCN5XNR 7.3* 2014 
 
 Lab Results 
 Component Value Date 
  CHOLEX 161 2015 
  HDLEX 56.2 2015 
  LDLEX 58 2015 
  TRIGEX 232 2015 
 
 Lab Results 
 Component Value Date 
  WBCEX 5.5 2015 
  HGBEX 13.3 2015 
  HCTEX 41.1 2015 
  PLTEX 158 2015 
 
 Lab Results 
 Component Value Date 
  TACROLIMUSEX 5.5 2015 
 
 Lab Results 
 Component Value Date 
  UAEX negative 2015 
  UAEX normal 2015 
  UAEX negative 2015 
  UAEX 6 2015 
  UAEX 25 mg/dl 2015 
  UAEX 10 2015 
  UAEX 1.011 2015 
  UAEX 500 2015 
 
 Assessment: 
 
 1.  Renal Allograft--allograft function is stable.
 2.  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
 3.  Type 2 DM--recent Hba1c not done.  Will reorder.
 
 4.  Hyperlipidemia--stable on moderate intensity rosuvastatin.
 5.  Hypertension--good control.
 6.  SHPTH--stable.  
 7.  Obesity/JACK/Pulmonary hypertension-- RHF has improved last 5-6 years?
 8.  CAD, s/p CABG, --stable on ASA, metoprolol, atorvastatin.
 9.  Type IV RTA--stable.
 10. Chronic Low Back pain--in remission.
 11.  worsening DJD, left knee--about same.
 12.  UTI in allograft--resolving.
 
 Plan: 
 
  
 1.   She will finish a 7 course of the Cipro.
 2.  Need to ensure that a Hba1c is included on her standing order.
 3.  She is on concurrent Rx with both an ACEI, and ARB, losartan, but has very good CHF and
 BP control. I am very reticent to alter this as she has done well on this regimen.
 4.  I had a lyric discussion with her that bariatric surgery may be realistic for her if de
eded an acceptable candidate at an experienced center.   To a large degree, her DJD, Type 2 
DM, and JACK are related to her centripetal obesity.
 5.  Will plan on seeing her back in 6 mo. at the [facility].   She will 
continue to do her Standing Order Q 3-4 months
 
 CC:    Jose David Dalal M.D., [facility], [facility]
            Edgar Sanders MD, [facility], Orthopedics
 
 Electronically signed by Marzena Moser DO at 2015 12:11 PM PSTdocumented in thi
s encounter
 
 Plan of Treatment
 
 
+--------+-----------+------------+----------------------+-------------------+
| Date   | Type      | Specialty  | Care Team            | Description       |
+--------+-----------+------------+----------------------+-------------------+
| / | Office    | Cardiology |   Tonia Wilson,    |                   |
|    | Visit     |            | ARNP  [address]   |                   |
|        |           |            | [place], [place]  |                   |
|        |           |            | [address] [phone]  |                   |
|        |           |            |  [phone] (Fax)  |                   |
+--------+-----------+------------+----------------------+-------------------+
| / | Hospital  | Radiology  |   Popeye Townsend, |                   |
2020   | Encounter |            |  MD  [address] |                   |
|        |           |            |  [facility]   |                   |
|        |           |            | [place] [address]             |                   |
|        |           |            | [phone]         |                   |
|        |           |            | [phone] (Fax)   |                   |
+--------+-----------+------------+----------------------+-------------------+
| / | Surgery   | Radiology  |   Popeye Townsend, | CV EP PPM SYSTEM  |
|    |           |            |  MD  [address] | IMPLANT           |
|        |           |            |  [place],   |                   |
|        |           |            | [place] [address]             |                   |
|        |           |            | [phone]         |                   |
|        |           |            | [phone] (Fax)   |                   |
+--------+-----------+------------+----------------------+-------------------+
| 02/10/ | Clinical  | Cardiology |                      |                   |
|    | Support   |            |                      |                   |
+--------+-----------+------------+----------------------+-------------------+
| / | Office    | Cardiology |   Tonia Wilson,    |                   |
|    | Visit     |            | [facility]  [address]   |                   |
 
|        |           |            | [place]  |                   |
|        |           |            | [address]  [phone]  |                   |
|        |           |            |  [phone] (Fax)  |                   |
+--------+-----------+------------+----------------------+-------------------+
| / | Off-Site  | Nephrology |   Marzena Moser  |                   |
|    | Visit     |            | DO [place]  [address] [place] [address]      |                   |
|        |           |            | [address]      |                   |
|        |           |            | [place]      |                   |
|        |           |            | [address]  [phone]  |                   |
|        |           |            |  [phone] (Fax)  |                   |
+--------+-----------+------------+----------------------+-------------------+
 documented as of this encounter
 
 Visit Diagnoses
 
 
+-----------------------------------------------------------------------------------------+
| Diagnosis                                                                               |
+-----------------------------------------------------------------------------------------+
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
| lesion of membranous glomerulonephritis                                                 |
+-----------------------------------------------------------------------------------------+
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
| stage IV, or unspecified(403.90)  Unspecified hypertensive kidney disease with chronic  |
| kidney disease stage I through stage IV, or unspecified                                 |
+-----------------------------------------------------------------------------------------+
|   Diabetes mellitus type II, uncontrolled (HCC)  Type II or unspecified type diabetes   |
| mellitus without mention of complication, uncontrolled                                  |
+-----------------------------------------------------------------------------------------+
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
+-----------------------------------------------------------------------------------------+
 documented in this encounter

## 2020-01-08 NOTE — XMS
Encounter Summary
  Created on: 2020
 
 Viviana Ricci
 External Reference #: [id number]
 : 47
 Sex: Female
 
 Demographics
 
 
+-----------------------+----------------------+
| Address               | [address]      |
|                       | [place]  [address] |
+-----------------------+----------------------+
| Home Phone            | [phone]      |
+-----------------------+----------------------+
| Preferred Language    | Unknown              |
+-----------------------+----------------------+
| Marital Status        | Single               |
+-----------------------+----------------------+
| Denominational Affiliation | [id number]                 |
+-----------------------+----------------------+
| Race                  | Unknown              |
+-----------------------+----------------------+
| Ethnic Group          | Unknown              |
+-----------------------+----------------------+
 
 
 Author
 
 
+--------------+--------------------------------------------+
| Author       | [facility]  |
|              | and [place]                                |
+--------------+--------------------------------------------+
| Organization | [facility]  |
|              | and [place]                                |
+--------------+--------------------------------------------+
| Address      | Unknown                                    |
+--------------+--------------------------------------------+
| Phone        | Unavailable                                |
+--------------+--------------------------------------------+
 
 
 
 Support
 
 
+----------------+--------------+---------------------+-----------------+
| Name           | Relationship | Address             | Phone           |
+----------------+--------------+---------------------+-----------------+
| Ada/Ed Radhames | ECON         | [address]              | [phone] |
|                |              | [place]  |                 |
|                |              |  [id number]              |                 |
+----------------+--------------+---------------------+-----------------+
 
 
 
 
 Care Team Providers
 
 
+------------------------+------+-----------------+
| Care Team Member Name  | Role | Phone           |
+------------------------+------+-----------------+
| Marzena Moser PCP  | [phone] |
+------------------------+------+-----------------+
 
 
 
 Reason for Referral
 Diagnostic/Screening (Routine)
 
+--------+--------+-----------+--------------+--------------+---------------+
| Status | Reason | Specialty | Diagnoses /  | Referred By  | Referred To   |
|        |        |           | Procedures   | Contact      | Contact       |
+--------+--------+-----------+--------------+--------------+---------------+
| Closed |        | Radiology |   Diagnoses  |   [facility]  |   [facility] |
|        |        |           |  Coronary    | Tonia, ARNP   |  Medicine     |
|        |        |           | artery       | [address] | [address]  |
|        |        |           | disease      |  St  [place]   |  [place], |
|        |        |           | involving    | [place]    |  [place]           |
|        |        |           | native       | [address]        | [address]    |
|        |        |           | coronary     | Phone:       | Phone:        |
|        |        |           | artery of    | [phone] | [phone]  |
|        |        |           | native heart |   Fax:       |  Fax:         |
|        |        |           |  without     | [phone] | [phone]  |
|        |        |           | angina       |              |               |
|        |        |           | pectoris     |              |               |
|        |        |           | Hypertension |              |               |
|        |        |           | , essential  |              |               |
|        |        |           |  Mixed       |              |               |
|        |        |           | hyperlipidem |              |               |
|        |        |           | ia           |              |               |
|        |        |           | Procedures   |              |               |
|        |        |           | NM Nuclear   |              |               |
|        |        |           | Stress Test  |              |               |
|        |        |           | (Vasodilator |              |               |
|        |        |           | )            |              |               |
+--------+--------+-----------+--------------+--------------+---------------+
 
 
 
 
 Encounter Details
 
 
+--------+-----------+----------------------+----------------------+----------------------+
| Date   | Type      | Department           | Care Team            | Description          |
+--------+-----------+----------------------+----------------------+----------------------+
| 10/30/ | Hospital  |   [facility] |   Tonia Wilson,    | Coronary artery      |
| 2019   | Encounter |  [facility]     | ARNP  [address]   | disease involving    |
|        |           | MEDICINE  [address]      | [address]  [place]  | native coronary      |
|        |           | [address]  [place], | [address]  [phone]  | artery of native     |
|        |           |  [place] [address]       |  [phone] (Fax)  | heart without angina |
|        |           | [phone]         |  Cardiologist, [place]   |  pectoris;           |
|        |           |                      |                      | Hypertension,        |
|        |           |                      |                      | essential; Mixed     |
 
|        |           |                      |                      | hyperlipidemia       |
+--------+-----------+----------------------+----------------------+----------------------+
 
 
 
 Social History
 
 
+--------------+-------+-----------+--------+------+
| Tobacco Use  | Types | Packs/Day | Years  | Date |
|              |       |           | Used   |      |
+--------------+-------+-----------+--------+------+
| Never Smoker |       |           |        |      |
+--------------+-------+-----------+--------+------+
 
 
 
+---------------------+---+---+---+
| Smokeless Tobacco:  |   |   |   |
| Never Used          |   |   |   |
+---------------------+---+---+---+
 
 
 
+---------------------------------------------------------------+
| Comments: some second hand smoke exposure, but fairly minimal |
+---------------------------------------------------------------+
 
 
 
+-------------+-------------+---------+----------+
| Alcohol Use | Drinks/Week | oz/Week | Comments |
+-------------+-------------+---------+----------+
| No          |             |         |          |
+-------------+-------------+---------+----------+
 
 
 
+------------------+---------------+
| Sex Assigned at  | Date Recorded |
| Birth            |               |
+------------------+---------------+
| Not on file      |               |
+------------------+---------------+
 
 
 
+----------------+-------------+-------------+
| Job Start Date | Occupation  | Industry    |
+----------------+-------------+-------------+
| Not on file    | Not on file | Not on file |
+----------------+-------------+-------------+
 
 
 
+----------------+--------------+------------+
| Travel History | Travel Start | Travel End |
+----------------+--------------+------------+
 
 
 
 
+-------------------------------------+
| No recent travel history available. |
+-------------------------------------+
 documented as of this encounter
 
 Last Filed Vital Signs
 
 
+-------------------+-------------------+----------------------+----------+
| Vital Sign        | Reading           | Time Taken           | Comments |
+-------------------+-------------------+----------------------+----------+
| Blood Pressure    | -                 | -                    |          |
+-------------------+-------------------+----------------------+----------+
| Pulse             | -                 | -                    |          |
+-------------------+-------------------+----------------------+----------+
| Temperature       | -                 | -                    |          |
+-------------------+-------------------+----------------------+----------+
| Respiratory Rate  | -                 | -                    |          |
+-------------------+-------------------+----------------------+----------+
| Oxygen Saturation | -                 | -                    |          |
+-------------------+-------------------+----------------------+----------+
| Inhaled Oxygen    | -                 | -                    |          |
| Concentration     |                   |                      |          |
+-------------------+-------------------+----------------------+----------+
| Weight            | 118.8 kg (262 lb) | 10/30/2019 10:00 AM  |          |
|                   |                   | PDT                  |          |
+-------------------+-------------------+----------------------+----------+
| Height            | -                 | -                    |          |
+-------------------+-------------------+----------------------+----------+
| Body Mass Index   | 42.29             | 10/10/2019  1:25 PM  |          |
|                   |                   | PDT                  |          |
+-------------------+-------------------+----------------------+----------+
 documented in this encounter
 
 Medications at Time of Discharge
 
 
+----------------------+----------------------+-----------+---------+----------+-----------+
| Medication           | Sig                  | Dispensed | Refills | Start    | End Date  |
|                      |                      |           |         | Date     |           |
+----------------------+----------------------+-----------+---------+----------+-----------+
|   allopurinol        | take 1 tablet by     |   30      | 11      | 20 |           |
| (ZYLOPRIM) 100 mg    | mouth once daily     | tablet    |         | 18       |           |
| tablet               |                      |           |         |          |           |
+----------------------+----------------------+-----------+---------+----------+-----------+
|   aspirin 81 mg EC   | Take 81 mg by mouth  |           | 0       |          |           |
| tablet               | Daily.               |           |         |          |           |
+----------------------+----------------------+-----------+---------+----------+-----------+
|   B-D INS SYRINGE    | USE BEFORE MEALS AS  |   1 each  | 11      | 20 |           |
| 0.5CC/31GX5/16 31G X | DIRECTED             |           |         | 18       |           |
|  5/16" 0.5 ML MISC   |                      |           |         |          |           |
+----------------------+----------------------+-----------+---------+----------+-----------+
|   cholecalciferol    | Take 2,000 Units by  |           | 0       |          |           |
| (VITAMIN D-3) 2000   | mouth Every other    |           |         |          |           |
| UNITS                | day.                 |           |         |          |           |
| TABSIndications:     |                      |           |         |          |           |
| Unspecified          |                      |           |         |          |           |
| hypertensive kidney  |                      |           |         |          |           |
| disease with chronic |                      |           |         |          |           |
 
|  kidney disease      |                      |           |         |          |           |
| stage I through      |                      |           |         |          |           |
| stage IV, or         |                      |           |         |          |           |
| unspecified(403.90), |                      |           |         |          |           |
|  FSGS (focal         |                      |           |         |          |           |
| segmental            |                      |           |         |          |           |
| glomerulosclerosis), |                      |           |         |          |           |
|  Hyperlipidemia,     |                      |           |         |          |           |
| Complications of     |                      |           |         |          |           |
| transplanted kidney  |                      |           |         |          |           |
+----------------------+----------------------+-----------+---------+----------+-----------+
|   cinacalcet         | take 1 tablet by     |   90      | 3       | 20 |           |
| (SENSIPAR) 30 mg     | mouth once daily     | tablet    |         | 19       |           |
| tablet               |                      |           |         |          |           |
+----------------------+----------------------+-----------+---------+----------+-----------+
|   cyclobenzaprine    | Take 1 tablet by     |   45      | 0       | 20 |           |
| (FLEXERIL) 10 mg     | mouth Twice  daily   | tablet    |         | 19       |           |
| tablet               | as needed for Muscle |           |         |          |           |
|                      |  spasms.             |           |         |          |           |
+----------------------+----------------------+-----------+---------+----------+-----------+
|   fludrocortisone    | Take 1 tablet by     |   45      | 3       | 20 |           |
| (FLORINEF) 0.1 mg    | mouth Every other    | tablet    |         | 19       |           |
| tablet               | day.                 |           |         |          |           |
+----------------------+----------------------+-----------+---------+----------+-----------+
|   furosemide (LASIX) | Take 1 tablet by     |   30      | 11      | 20 |           |
|  40 mg               | mouth Daily as       | tablet    |         | 18       |           |
| tabletIndications:   | needed.              |           |         |          |           |
| Edema, unspecified   |                      |           |         |          |           |
| type, FSGS (focal    |                      |           |         |          |           |
| segmental            |                      |           |         |          |           |
| glomerulosclerosis), |                      |           |         |          |           |
|  Hyperlipidemia      |                      |           |         |          |           |
+----------------------+----------------------+-----------+---------+----------+-----------+
|   glucose blood VI   | Check blood sugar    |   100     | 12      | 20 |           |
| test strips (ONE     | before each meal and | each      |         | 12       |           |
| TOUCH ULTRA TEST)    |  as directed         |           |         |          |           |
| stripIndications:    |                      |           |         |          |           |
| Unspecified          |                      |           |         |          |           |
| hypertensive kidney  |                      |           |         |          |           |
| disease with chronic |                      |           |         |          |           |
|  kidney disease      |                      |           |         |          |           |
| stage I through      |                      |           |         |          |           |
| stage IV, or         |                      |           |         |          |           |
| unspecified(403.90), |                      |           |         |          |           |
|  Complications of    |                      |           |         |          |           |
| transplanted kidney, |                      |           |         |          |           |
|  Diabetes mellitus   |                      |           |         |          |           |
| type II,             |                      |           |         |          |           |
| uncontrolled (HCC),  |                      |           |         |          |           |
| Hyperlipidemia       |                      |           |         |          |           |
+----------------------+----------------------+-----------+---------+----------+-----------+
|   insulin glargine   | inject 20 units      |   10 vial | 11      | 20 |           |
| (LANTUS) 100         | subcutaneously every |           |         | 18       |           |
| units/mL injection   |  morning             |           |         |          |           |
| (vial)Indications:   |                      |           |         |          |           |
| Type 2 diabetes      |                      |           |         |          |           |
| mellitus with        |                      |           |         |          |           |
| chronic kidney       |                      |           |         |          |           |
| disease, without     |                      |           |         |          |           |
| long-term current    |                      |           |         |          |           |
 
| use of insulin,      |                      |           |         |          |           |
| unspecified CKD      |                      |           |         |          |           |
| stage ([facility]), Kidney  |                      |           |         |          |           |
| replaced by          |                      |           |         |          |           |
| transplant           |                      |           |         |          |           |
+----------------------+----------------------+-----------+---------+----------+-----------+
|   insulin lispro     | inject               |   10 vial | 11      | 11/15/20 |           |
| (HUMALOG) 100        | subcutaneously       |           |         | 17       |           |
| units/mL injection   | BEFORE MEALS         |           |         |          |           |
| (vial)Indications:   | ACCORDING TO SLIDING |           |         |          |           |
| Type 2 diabetes      |  SCALE Max dose 20   |           |         |          |           |
| mellitus with        | units daily          |           |         |          |           |
| complication, with   |                      |           |         |          |           |
| long-term current    |                      |           |         |          |           |
| use of insulin ([facility]) |                      |           |         |          |           |
+----------------------+----------------------+-----------+---------+----------+-----------+
|   levothyroxine      | take 1 tablet by     |   30      | 11      | 20 |           |
| (SYNTHROID) 50 mcg   | mouth once daily     | tablet    |         | 18       |           |
| tablet               |                      |           |         |          |           |
+----------------------+----------------------+-----------+---------+----------+-----------+
|   lisinopril         | take 1 tablet by     |   90      | 3       | 20 |           |
| (PRINIVIL,ZESTRIL)   | mouth once daily     | tablet    |         | 17       |           |
| 30 MG tablet         |                      |           |         |          |           |
+----------------------+----------------------+-----------+---------+----------+-----------+
|   loperamide         | Take 1 capsule by    |   60      | 5       | 20 |           |
| (ANTI-DIARRHEAL) 2   | mouth 4 times daily  | capsule   |         | 14       |           |
| mg                   | as needed.           |           |         |          |           |
| capsuleIndications:  |                      |           |         |          |           |
| Unspecified          |                      |           |         |          |           |
| hypertensive kidney  |                      |           |         |          |           |
| disease with chronic |                      |           |         |          |           |
|  kidney disease      |                      |           |         |          |           |
| stage I through      |                      |           |         |          |           |
| stage IV, or         |                      |           |         |          |           |
| unspecified(403.90), |                      |           |         |          |           |
|  FSGS (focal         |                      |           |         |          |           |
| segmental            |                      |           |         |          |           |
| glomerulosclerosis), |                      |           |         |          |           |
|  Hypothyroidism,     |                      |           |         |          |           |
| Hyperlipidemia       |                      |           |         |          |           |
+----------------------+----------------------+-----------+---------+----------+-----------+
|   losartan (COZAAR)  | take 1 tablet by     |   90      | 3       | 20 |           |
| 50 mg tablet         | mouth once daily     | tablet    |         | 18       |           |
+----------------------+----------------------+-----------+---------+----------+-----------+
|   magnesium oxide    | take 1 tablet by     |   60      | 11      | 10/02/20 |           |
| (MAG-OX) 400 mg      | mouth twice a day    | tablet    |         | 18       |           |
| tablet               |                      |           |         |          |           |
+----------------------+----------------------+-----------+---------+----------+-----------+
|   predniSONE         | take 1 tablet by     |   90      | 3       | 10/02/20 |           |
| (DELTASONE) 5 mg     | mouth once daily     | tablet    |         | 18       |           |
| tablet               |                      |           |         |          |           |
+----------------------+----------------------+-----------+---------+----------+-----------+
|   Prenatal Vit-Fe    | take 1 tablet by     |   30      | 11      | 20 |           |
| Fumarate-FA (PNV     | mouth once daily.    | tablet    |         | 18       |           |
| PRENATAL PLUS        |                      |           |         |          |           |
| MULTIVITAMIN) 27-1   |                      |           |         |          |           |
| MG TABS              |                      |           |         |          |           |
+----------------------+----------------------+-----------+---------+----------+-----------+
|   Respiratory        | Continue nocturnal   |   1 each  | 0       | 20 |           |
| Therapy Supplies     | O2 at 2 l/m through  |           |         | 18       |           |
 
| MISCIndications: JACK | CPAP for lifetime.   |           |         |          |           |
|  (obstructive sleep  | Dx: JACK G47.33       |           |         |          |           |
| apnea)               | Please provide new   |           |         |          |           |
|                      | nasal mask for CPAP  |           |         |          |           |
|                      | and any other needed |           |         |          |           |
|                      |  replacement         |           |         |          |           |
|                      | supplies.            |           |         |          |           |
+----------------------+----------------------+-----------+---------+----------+-----------+
|   Respiratory        | Decrease CPAP to     |   1 each  | 0       | 20 |           |
| Therapy Supplies     | 12-18 cmH2O          |           |         | 13       |           |
| MISC                 | Diagnosis            |           |         |          |           |
|                      | Code(s)327.23.       |           |         |          |           |
|                      | Please send order to |           |         |          |           |
|                      |  [facility]     |           |         |          |           |
+----------------------+----------------------+-----------+---------+----------+-----------+
|   rosuvastatin       | take 1 tablet by     |   30      | 11      | 20 |           |
| (CRESTOR) 20 mg      | mouth NIGHTLY        | tablet    |         | 18       |           |
| tablet               |                      |           |         |          |           |
+----------------------+----------------------+-----------+---------+----------+-----------+
|   fludrocortisone    | Take 1 tablet by     |           | 0       |          |  |
| (FLORINEF) 0.1 mg    | mouth Every other    |           |         |          | 9         |
| tablet               | day.                 |           |         |          |           |
+----------------------+----------------------+-----------+---------+----------+-----------+
|   metoprolol         | take 1 tablet by     |   60      | 11      | 20 |  |
| tartrate (LOPRESSOR) | mouth twice a day    | tablet    |         | 19       | 9         |
|  25 mg tablet        |                      |           |         |          |           |
+----------------------+----------------------+-----------+---------+----------+-----------+
|   mycophenolate      | Take 1 capsule by    |   60      | 11      | 20 |  |
| (CELLCEPT) 250 mg    | mouth 2 times daily. | capsule   |         | 18       | 9         |
| capsuleIndications:  |                      |           |         |          |           |
| Kidney replaced by   |                      |           |         |          |           |
| transplant           |                      |           |         |          |           |
+----------------------+----------------------+-----------+---------+----------+-----------+
|   QVAR REDIHALER 80  |                      |           | 0       | 20 |  |
| MCG/ACT inhaler      |                      |           |         | 18       | 9         |
+----------------------+----------------------+-----------+---------+----------+-----------+
|   tacrolimus         | Take 1 capsule by    |   240     | 0       | 20 |  |
| (PROGRAF) 1 mg       | mouth 2 times daily. | capsule   |         | 19       | 9         |
| capsuleIndications:  |  For a total dose of |           |         |          |           |
| Kidney replaced by   |  1 mg, by mouth, 2   |           |         |          |           |
| transplant           | times daily.         |           |         |          |           |
+----------------------+----------------------+-----------+---------+----------+-----------+
 documented as of this encounter
 
 Plan of Treatment
 
 
+--------+-----------+------------+----------------------+-------------------+
| Date   | Type      | Specialty  | Care Team            | Description       |
+--------+-----------+------------+----------------------+-------------------+
| / | Office    | Cardiology |   Tonia Wilson,    |                   |
|    | Visit     |            | ARNP  [address]   |                   |
|        |           |            | [place]  |                   |
|        |           |            | [address] [phone]  |                   |
|        |           |            |  [phone] (Fax)  |                   |
+--------+-----------+------------+----------------------+-------------------+
| / | Hospital  | Radiology  |   Popeye Townsend, |                   |
|    | Encounter |            |  MD  [address] |                   |
|        |           |            |  [place]   |                   |
|        |           |            | [place] [address]             |                   |
 
|        |           |            | [phone]         |                   |
|        |           |            | [phone] (Fax)   |                   |
+--------+-----------+------------+----------------------+-------------------+
| / | Surgery   | Radiology  |   Popeye Townsend, | CV EP PPM SYSTEM  |
|    |           |            |  MD  [address] | IMPLANT           |
|        |           |            |  St.  [place],   |                   |
|        |           |            | [place] [address]             |                   |
|        |           |            | [phone]         |                   |
|        |           |            | [phone] (Fax)   |                   |
+--------+-----------+------------+----------------------+-------------------+
| 02/10/ | Clinical  | Cardiology |                      |                   |
|    | Support   |            |                      |                   |
+--------+-----------+------------+----------------------+-------------------+
| / | Office    | Cardiology |   Tonia Wilson,    |                   |
|    | Visit     |            | ARNJESSICA  [address]   |                   |
|        |           |            | St  [place], [place]  |                   |
|        |           |            | [address]  [phone]  |                   |
|        |           |            |  [phone] (Fax)  |                   |
+--------+-----------+------------+----------------------+-------------------+
| / | Off-Site  | Nephrology |   Marzena Moser  |                   |
2020   | Visit     |            | DO ETIENNE  [address]      |                   |
|        |           |            | [address]      |                   |
|        |           |            | [place], [place]      |                   |
|        |           |            | [address]  [phone]  |                   |
|        |           |            |  [phone] (Fax)  |                   |
+--------+-----------+------------+----------------------+-------------------+
 documented as of this encounter
 
 Procedures
 
 
+----------------------+--------+-------------+----------------------+----------------------
+
| Procedure Name       | Priori | Date/Time   | Associated Diagnosis | Comments             
|
|                      | ty     |             |                      |                      
|
+----------------------+--------+-------------+----------------------+----------------------
+
| NM NUCLEAR STRESS    | Routin | 10/30/2019  |   Coronary artery    |   Results for this   
|
| TEST (PHARMACOLOGIC  | e      | 12:56 PM    | disease involving    | procedure are in the 
|
| - VASODILATOR)       |        | PDT         | native coronary      |  results section.    
|
|                      |        |             | artery of native     |                      
|
|                      |        |             | heart without angina |                      
|
|                      |        |             |  pectoris            |                      
|
|                      |        |             | Hypertension,        |                      
|
|                      |        |             | essential  Mixed     |                      
|
|                      |        |             | hyperlipidemia       |                      
|
+----------------------+--------+-------------+----------------------+----------------------
+
 documented in this encounter
 
 
 Results
 NM Nuclear Stress Test (Vasodilator) (10/30/2019 12:56 PM PDT)
 
+-------------+--------+-----------+-------------+--------------+
| Component   | Value  | Ref Range | Performed   | Pathologist  |
|             |        |           | At          | Signature    |
+-------------+--------+-----------+-------------+--------------+
| BASELINE    | 93     | bpm       | [facility] |              |
| HEART RATE  |        |           |             |              |
+-------------+--------+-----------+-------------+--------------+
| BASELINE    | 99/51  | mmHg      | [facility] |              |
| BLOOD       |        |           |             |              |
| PRESSURE    |        |           |             |              |
+-------------+--------+-----------+-------------+--------------+
| PEAK HEART  | 109    |           | [facility] |              |
| RATE        |        |           |             |              |
+-------------+--------+-----------+-------------+--------------+
| PEAK BLOOD  | 111/42 | mmHG      | [facility] |              |
| PRESSURE    |        |           |             |              |
+-------------+--------+-----------+-------------+--------------+
| Target HR   | 126    |           | [facility] |              |
+-------------+--------+-----------+-------------+--------------+
| Percent HR  | 74     |           | [facility] |              |
+-------------+--------+-----------+-------------+--------------+
| LVEF-SPECT  | 63     | %         | [facility] |              |
| NUCLEAR     |        |           |             |              |
| STRESS/VIAB |        |           |             |              |
| ILITY       |        |           |             |              |
+-------------+--------+-----------+-------------+--------------+
| ST          | 0.0    | mm        | [facility] |              |
| Elevation   |        |           |             |              |
| (mm)        |        |           |             |              |
+-------------+--------+-----------+-------------+--------------+
 
 
 
+----------+
| Specimen |
+----------+
|          |
+----------+
 
 
 
+----------------------------------------------------------------------+---------------+
| Narrative                                                            | Performed At  |
+----------------------------------------------------------------------+---------------+
|   This result has an attachment that is not available.  1.           |   [facility] |
|   Persantine EKG is negative.2.   Normal Persantine sestamibi        |               |
| myocardial perfusion imaging study.   Normal left ventricular size,  |               |
| wall thickness and motion.   Preserved left ventricular systolic     |               |
| function.   LVEF by gated SPECT is 63%.                              |               |
+----------------------------------------------------------------------+---------------+
 
 
 
+---------------+---------+--------------------+--------------+
| Performing    | Address | City/State/Zipcode | Phone Number |
| Organization  |         |                    |              |
 
+---------------+---------+--------------------+--------------+
|   [facility] |         |                    |              |
+---------------+---------+--------------------+--------------+
 documented in this encounter
 
 Visit Diagnoses
 
 
+-------------------------------------------------------------------------------------+
| Diagnosis                                                                           |
+-------------------------------------------------------------------------------------+
|   Coronary artery disease involving native coronary artery of native heart without  |
| angina pectoris                                                                     |
+-------------------------------------------------------------------------------------+
|   Hypertension, essential  Unspecified essential hypertension                       |
+-------------------------------------------------------------------------------------+
|   Mixed hyperlipidemia                                                              |
+-------------------------------------------------------------------------------------+
 documented in this encounter
 
 Administered Medications
 
 
+-----------------------------------+--------+----------+-------+------+------+
| Medication Order                  | MAR    | Action   | Dose  | Rate | Site |
|                                   | Action | Date     |       |      |      |
+-----------------------------------+--------+----------+-------+------+------+
|   aminophylline injection 75 mg   | Given  | 10/30/20 | 75 mg |      |      |
| 75 mg, Intravenous, ONCE PRN, per |        | 19 10:44 |       |      |      |
|  doctor, Starting Wed 10/30/19 at |        |  AM PDT  |       |      |      |
|  1043, For 1 dose, Nuclear        |        |          |       |      |      |
| Medicine                          |        |          |       |      |      |
+-----------------------------------+--------+----------+-------+------+------+
 
 
 
+---+---+
|   |   |
+---+---+
 
 
 
+-----------------------------------+-------+----------+----------+--------+---+
|   dipyridamole (PERSANTINE) 60mg  | Given | 10/30/20 | 16.8696  | 674.8  |   |
| in 40 mL NS syringe  0.142        |       | 19 11:00 | mg/min   | mL/hr  |   |
| mg/kg/min                         |       |  AM PDT  |          |        |   |
|  118.8 kg (674.784 mL/hr, rounded |       |          |          |        |   |
|  to 674.8 mL/hr), Intravenous,    |       |          |          |        |   |
| Administer over 4 Minutes, ONCE,  |       |          |          |        |   |
| Wed 10/30/19 at 1100, For 1 dose, |       |          |          |        |   |
|  Nuclear Medicine                 |       |          |          |        |   |
+-----------------------------------+-------+----------+----------+--------+---+
 
 
 
+---+---+
|   |   |
+---+---+
 
 
 
 
+---------------------------------+-------+----------+----------+---+---+
|   technetium TC-99M sestamibi   | Given | 10/30/20 | 10.1     |   |   |
| (CARDIOLITE) injection 10.1     |       | 19 10:44 | millicur |   |   |
| millicurie  10.1 millicurie,    |       |  AM PDT  | ies      |   |   |
| Intravenous, ONCE PRN, Other,   |       |          |          |   |   |
| Starting Wed 10/30/19 at 1043,  |       |          |          |   |   |
| For 1 dose, Nuclear Medicine    |       |          |          |   |   |
+---------------------------------+-------+----------+----------+---+---+
 
 
 
+---+---+
|   |   |
+---+---+
 documented in this encounter

## 2020-01-08 NOTE — XMS
Encounter Summary
  Created on: 2020
 
 Viviana Ricci
 External Reference #: [id number]
 : 47
 Sex: Female
 
 Demographics
 
 
+-----------------------+----------------------+
| Address               | [address]      |
|                       | [place]  [address] |
+-----------------------+----------------------+
| Home Phone            | [phone]      |
+-----------------------+----------------------+
| Preferred Language    | Unknown              |
+-----------------------+----------------------+
| Marital Status        | Single               |
+-----------------------+----------------------+
| Judaism Affiliation | [id number]                 |
+-----------------------+----------------------+
| Race                  | Unknown              |
+-----------------------+----------------------+
| Ethnic Group          | Unknown              |
+-----------------------+----------------------+
 
 
 Author
 
 
+--------------+--------------------------------------------+
| Author       | [facility]  |
|              | [facility]                                |
+--------------+--------------------------------------------+
| Organization | [facility]  |
|              | and [facility]                                |
+--------------+--------------------------------------------+
| Address      | Unknown                                    |
+--------------+--------------------------------------------+
| Phone        | Unavailable                                |
+--------------+--------------------------------------------+
 
 
 
 Support
 
 
+----------------+--------------+---------------------+-----------------+
| Name           | Relationship | Address             | Phone           |
+----------------+--------------+---------------------+-----------------+
| Ada/Ed Radhames | ECON         | [address]              | [phone] |
|                |              | [place]  |                 |
|                |              |  [address]              |                 |
+----------------+--------------+---------------------+-----------------+
 
 
 
 
 Care Team Providers
 
 
+-----------------------+------+-------------+
| Care Team Member Name | Role | Phone       |
+-----------------------+------+-------------+
 PCP  | Unavailable |
+-----------------------+------+-------------+
 
 
 
 Encounter Details
 
 
+--------+----------+---------------------+----------------------+-------------+
| Date   | Type     | Department          | Care Team            | Description |
+--------+----------+---------------------+----------------------+-------------+
| / | Abstract |   [facility] [place]         |   Marzena Moser  |             |
|    |          | NEPHROLOGY  [address]   | M, DO  [address]      |             |
|        |          | [address]   | [address]      |             |
|        |          | [place], [place]     | [place]      |             |
|        |          | [address]          | [address]  [phone]  |             |
|        |          | [phone]        |  [phone] (Fax)  |             |
+--------+----------+---------------------+----------------------+-------------+
 
 
 
 Social History
 
 
+--------------+-------+-----------+--------+------+
| Tobacco Use  | Types | Packs/Day | Years  | Date |
|              |       |           | Used   |      |
+--------------+-------+-----------+--------+------+
| Never Smoker |       |           |        |      |
+--------------+-------+-----------+--------+------+
 
 
 
+---------------------+---+---+---+
| Smokeless Tobacco:  |   |   |   |
| Never Used          |   |   |   |
+---------------------+---+---+---+
 
 
 
+-------------+-------------+---------+----------+
| Alcohol Use | Drinks/Week | oz/Week | Comments |
+-------------+-------------+---------+----------+
| No          |             |         |          |
+-------------+-------------+---------+----------+
 
 
 
+------------------+---------------+
| Sex Assigned at  | Date Recorded |
| Birth            |               |
+------------------+---------------+
| Not on file      |               |
 
+------------------+---------------+
 
 
 
+----------------+-------------+-------------+
| Job Start Date | Occupation  | Industry    |
+----------------+-------------+-------------+
| Not on file    | Not on file | Not on file |
+----------------+-------------+-------------+
 
 
 
+----------------+--------------+------------+
| Travel History | Travel Start | Travel End |
+----------------+--------------+------------+
 
 
 
+-------------------------------------+
| No recent travel history available. |
+-------------------------------------+
 documented as of this encounter
 
 Plan of Treatment
 
 
+--------+-----------+------------+----------------------+-------------------+
| Date   | Type      | Specialty  | Care Team            | Description       |
+--------+-----------+------------+----------------------+-------------------+
| / | Office    | Cardiology |   Tonia Wilson,    |                   |
|    | Visit     |            | ARNP  [address]   |                   |
|        |           |            | St [place]  |                   |
|        |           |            | [address] [phone]  |                   |
|        |           |            |  [phone] (Fax)  |                   |
+--------+-----------+------------+----------------------+-------------------+
| / | Hospital  | Radiology  |   Popeye Townsend, |                   |
|    | Encounter |            |  MD  [address] |                   |
|        |           |            |  [place]   |                   |
|        |           |            | [place] [address]             |                   |
|        |           |            | [phone]         |                   |
|        |           |            | [phone] (Fax)   |                   |
+--------+-----------+------------+----------------------+-------------------+
| / | Surgery   | Radiology  |   CaryYinmarissa, | CV EP PPM SYSTEM  |
2020   |           |            |  MD  [address] | IMPLANT           |
|        |           |            |  [place],   |                   |
|        |           |            | [place] [address]             |                   |
|        |           |            | [phone]         |                   |
|        |           |            | [phone] (Fax)   |                   |
+--------+-----------+------------+----------------------+-------------------+
| 02/10/ | Clinical  | Cardiology |                      |                   |
|    | Support   |            |                      |                   |
+--------+-----------+------------+----------------------+-------------------+
| / | Office    | Cardiology |   Tonia Wilson,    |                   |
2020   | Visit     |            | BELKIS  [address]   |                   |
|        |           |            |   [place]  |                   |
|        |           |            | [address]  [phone]  |                   |
|        |           |            |  [phone] (Fax)  |                   |
+--------+-----------+------------+----------------------+-------------------+
| / | Off-Site  | Nephrology |   Marzena Moser  |                   |
2020   | Visit     |            | DO [address]      |                   |
 
|        |           |            | [address]      |                   |
|        |           |            | [place]      |                   |
|        |           |            | [address]  [phone]  |                   |
|        |           |            |  [phone] (Fax)  |                   |
+--------+-----------+------------+----------------------+-------------------+
 documented as of this encounter
 
 Procedures
 
 
+-----------------+--------+------------+----------------------+----------------------+
| Procedure Name  | Priori | Date/Time  | Associated Diagnosis | Comments             |
|                 | ty     |            |                      |                      |
+-----------------+--------+------------+----------------------+----------------------+
| COMPREHENSIVE   | Routin | 2013 |                      |   Results for this   |
| METABOLIC PANEL | e      |            |                      | procedure are in the |
|                 |        |            |                      |  results section.    |
+-----------------+--------+------------+----------------------+----------------------+
 documented in this encounter
 
 Results
 Comprehensive Metabolic Panel (2013)
 
+-------------+-----------+-----------------+-------------+--------------+
| Component   | Value     | Ref Range       | Performed   | Pathologist  |
|             |           |                 | At          | Signature    |
+-------------+-----------+-----------------+-------------+--------------+
| Na          | 141       | mmol/L          | [facility]  |              |
|             |           |                 | [facility]    |              |
|             |           |                 | [facility]     |              |
|             |           |                 | [facility] -    |              |
|             |           |                 | LABORATORY  |              |
+-------------+-----------+-----------------+-------------+--------------+
| K           | 4.4       | mmol/L          | [facility]  |              |
|             |           |                 | [facility]    |              |
|             |           |                 | [facility]     |              |
|             |           |                 | [facility] -    |              |
|             |           |                 | LABORATORY  |              |
+-------------+-----------+-----------------+-------------+--------------+
| Cl          | 104       | mmol/L          | [facility]  |              |
|             |           |                 | [facility]    |              |
|             |           |                 | [facility]     |              |
|             |           |                 | [facility] -    |              |
|             |           |                 | LABORATORY  |              |
+-------------+-----------+-----------------+-------------+--------------+
| CO2         | 28        | mmol/L          | [facility]  |              |
|             |           |                 | [facility]    |              |
|             |           |                 | [facility]     |              |
|             |           |                 | [facility] -    |              |
|             |           |                 | LABORATORY  |              |
+-------------+-----------+-----------------+-------------+--------------+
| BUN         | 47        | mg/dL           | [facility]  |              |
|             |           |                 | [facility]    |              |
|             |           |                 | [facility]     |              |
|             |           |                 | [facility] -    |              |
|             |           |                 | LABORATORY  |              |
+-------------+-----------+-----------------+-------------+--------------+
| CREA        | 1.4       | mg/dL           | [facility]  |              |
|             |           |                 | [facility]    |              |
|             |           |                 | [facility]     |              |
 
|             |           |                 | [facility] -    |              |
|             |           |                 | LABORATORY  |              |
+-------------+-----------+-----------------+-------------+--------------+
| Glucose     | 164       | mg/dL           | [facility]  |              |
|             |           |                 | [facility]    |              |
|             |           |                 | [facility]     |              |
|             |           |                 | [facility] -    |              |
|             |           |                 | LABORATORY  |              |
+-------------+-----------+-----------------+-------------+--------------+
| Calcium     | 10.2      | mg/dL           | [facility]  |              |
|             |           |                 | [facility]    |              |
|             |           |                 | [facility]     |              |
|             |           |                 | [facility] -    |              |
|             |           |                 | LABORATORY  |              |
+-------------+-----------+-----------------+-------------+--------------+
| PTH INTACT  | 226       | pg/mL           | [facility]  |              |
|             |           |                 | [facility]    |              |
|             |           |                 | [facility]     |              |
|             |           |                 | [facility] -    |              |
|             |           |                 | LABORATORY  |              |
+-------------+-----------+-----------------+-------------+--------------+
| Phosphorus  | 2.9       | 2.6 - 4.4 mg/dL | [facility]  |              |
|             |           |                 | [facility]    |              |
|             |           |                 | [facility]     |              |
|             |           |                 | [facility] -    |              |
|             |           |                 | LABORATORY  |              |
+-------------+-----------+-----------------+-------------+--------------+
| Magnesium   | 1.8       | mg/dL           | [facility]  |              |
|             |           |                 | [facility]    |              |
|             |           |                 | [facility]     |              |
|             |           |                 | [facility] -    |              |
|             |           |                 | LABORATORY  |              |
+-------------+-----------+-----------------+-------------+--------------+
| WBC         | 6.5       | K/uL            | [facility]  |              |
|             |           |                 | [facility]    |              |
|             |           |                 | [facility]     |              |
|             |           |                 | [facility] -    |              |
|             |           |                 | LABORATORY  |              |
+-------------+-----------+-----------------+-------------+--------------+
| Hemoglobin  | 13.0      | 11.6 - 15.5     | [facility]  |              |
|             |           | g/dL            | [facility]    |              |
|             |           |                 | [facility]     |              |
|             |           |                 | [facility] -    |              |
|             |           |                 | LABORATORY  |              |
+-------------+-----------+-----------------+-------------+--------------+
| Hematocrit  | 40.7      | 35.0 - 46.0 %   | [facility]  |              |
|             |           |                 | [facility]    |              |
|             |           |                 | [facility]     |              |
|             |           |                 | [facility] -    |              |
|             |           |                 | LABORATORY  |              |
+-------------+-----------+-----------------+-------------+--------------+
| Platelet    | 176       | 150 - 400 K/uL  | [facility]  |              |
| Count       |           |                 | [facility]    |              |
|             |           |                 | [facility]     |              |
|             |           |                 | [facility] -    |              |
|             |           |                 | LABORATORY  |              |
+-------------+-----------+-----------------+-------------+--------------+
| MCV         | 104.6 (A) | 80.0 - 98.0 fL  | [facility]  |              |
|             |           |                 | [facility]    |              |
|             |           |                 | [facility]     |              |
 
|             |           |                 | CENTER -    |              |
|             |           |                 | LABORATORY  |              |
+-------------+-----------+-----------------+-------------+--------------+
| Bilirubin,  | 0.5       | 0 - 2           | [facility]  |              |
| Total       |           |                 | [facility]    |              |
|             |           |                 | [facility]     |              |
|             |           |                 | [facility] -    |              |
|             |           |                 | LABORATORY  |              |
+-------------+-----------+-----------------+-------------+--------------+
| AST         | 19        | 5 - 40 U/L      | [facility]  |              |
|             |           |                 | [facility]    |              |
|             |           |                 | [facility]     |              |
|             |           |                 | [facility] -    |              |
|             |           |                 | LABORATORY  |              |
+-------------+-----------+-----------------+-------------+--------------+
| ALT         | 13        | U/L             | [facility]  |              |
|             |           |                 | [facility]    |              |
|             |           |                 | [facility]     |              |
|             |           |                 | [facility] -    |              |
|             |           |                 | LABORATORY  |              |
+-------------+-----------+-----------------+-------------+--------------+
| Albumin     | 4.0       | 3.3 - 4.8 g/dL  | [facility]  |              |
|             |           |                 | [facility]    |              |
|             |           |                 | [facility]     |              |
|             |           |                 | [facility] -    |              |
|             |           |                 | LABORATORY  |              |
+-------------+-----------+-----------------+-------------+--------------+
| Tacrolimus  | 3.7       |                 | [facility]  |              |
| Level       |           |                 | [facility]    |              |
|             |           |                 | [facility]     |              |
|             |           |                 | [facility] -    |              |
|             |           |                 | LABORATORY  |              |
+-------------+-----------+-----------------+-------------+--------------+
| Estimated   | 37.0      | mL/min/1.73m2   | [facility]  |              |
| GFR         |           |                 | [facility]    |              |
|             |           |                 | [facility]     |              |
|             |           |                 | CENTER -    |              |
|             |           |                 | LABORATORY  |              |
+-------------+-----------+-----------------+-------------+--------------+
| Alkaline    | 80        | 38 - 110 U/L    | [facility]  |              |
| Phosphatase |           |                 |  [facility]    |              |
|             |           |                 | [facility]     |              |
|             |           |                 | [facility] -    |              |
|             |           |                 | LABORATORY  |              |
+-------------+-----------+-----------------+-------------+--------------+
 
 
 
+-----------------+
| Specimen        |
+-----------------+
| Blood specimen  |
| (specimen)      |
+-----------------+
 
 
 
+----------------------+--------------------+--------------------+--------------+
| Performing           | Address            | City/State/Zipcode | Phone Number |
| Organization         |                    |                    |              |
 
+----------------------+--------------------+--------------------+--------------+
|   [facility]     |   [address] | [place]    |              |
| [facility]  |                    | [address]              |              |
| - LABORATORY         |                    |                    |              |
+----------------------+--------------------+--------------------+--------------+
 documented in this encounter
 
 Visit Diagnoses
 Not on filedocumented in this encounter

## 2020-01-08 NOTE — XMS
Encounter Summary
  Created on: 2020
 
 Viviana Ricci
 External Reference #: [id number]
 : 47
 Sex: Female
 
 Demographics
 
 
+-----------------------+----------------------+
| Address               | [address]      |
|                       | [place]  [address] |
+-----------------------+----------------------+
| Home Phone            | [phone]      |
+-----------------------+----------------------+
| Preferred Language    | Unknown              |
+-----------------------+----------------------+
| Marital Status        | Single               |
+-----------------------+----------------------+
| Christian Affiliation | [id number]                 |
+-----------------------+----------------------+
| Race                  | Unknown              |
+-----------------------+----------------------+
| Ethnic Group          | Unknown              |
+-----------------------+----------------------+
 
 
 Author
 
 
+--------------+--------------------------------------------+
| Author       | [facility]  |
|              | and [facility]                                |
+--------------+--------------------------------------------+
| Organization | [facility]  |
|              | and [facility]                                |
+--------------+--------------------------------------------+
| Address      | Unknown                                    |
+--------------+--------------------------------------------+
| Phone        | Unavailable                                |
+--------------+--------------------------------------------+
 
 
 
 Support
 
 
+----------------+--------------+---------------------+-----------------+
| Name           | Relationship | Address             | Phone           |
+----------------+--------------+---------------------+-----------------+
| Ada/Ed Radhames | ECON         | [address]              | [phone] |
|                |              | [place]  |                 |
|                |              |  [id number]              |                 |
+----------------+--------------+---------------------+-----------------+
 
 
 
 
 Care Team Providers
 
 
+-----------------------+------+-------------+
| Care Team Member Name | Role | Phone       |
+-----------------------+------+-------------+
 PCP  | Unavailable |
+-----------------------+------+-------------+
 
 
 
 Reason for Visit
 
 
+-------------------+----------+
| Reason            | Comments |
+-------------------+----------+
| Medication Refill |          |
+-------------------+----------+
 
 
 
 Encounter Details
 
 
+--------+--------+---------------------+----------------------+-------------------+
| Date   | Type   | Department          | Care Team            | Description       |
+--------+--------+---------------------+----------------------+-------------------+
| / | Refill |   [facility]         |   Marzena Moser  | Medication Refill |
|    |        | NEPHROLOGY  [address]   | M, DO  [address]      |                   |
|        |        | [address]   | [address]      |                   |
|        |        | [place], [place]     | [place], [place]      |                   |
|        |        | [address]          | [address]  [phone]  |                   |
|        |        | [phone]        |  [phone] (Fax)  |                   |
+--------+--------+---------------------+----------------------+-------------------+
 
 
 
 Social History
 
 
+--------------+-------+-----------+--------+------+
| Tobacco Use  | Types | Packs/Day | Years  | Date |
|              |       |           | Used   |      |
+--------------+-------+-----------+--------+------+
| Never Smoker |       |           |        |      |
+--------------+-------+-----------+--------+------+
 
 
 
+---------------------+---+---+---+
| Smokeless Tobacco:  |   |   |   |
| Never Used          |   |   |   |
+---------------------+---+---+---+
 
 
 
+---------------------------------------------------------------+
| Comments: some second hand smoke exposure, but fairly minimal |
 
+---------------------------------------------------------------+
 
 
 
+-------------+-------------+---------+----------+
| Alcohol Use | Drinks/Week | oz/Week | Comments |
+-------------+-------------+---------+----------+
| No          |             |         |          |
+-------------+-------------+---------+----------+
 
 
 
+------------------+---------------+
| Sex Assigned at  | Date Recorded |
| Birth            |               |
+------------------+---------------+
| Not on file      |               |
+------------------+---------------+
 
 
 
+----------------+-------------+-------------+
| Job Start Date | Occupation  | Industry    |
+----------------+-------------+-------------+
| Not on file    | Not on file | Not on file |
+----------------+-------------+-------------+
 
 
 
+----------------+--------------+------------+
| Travel History | Travel Start | Travel End |
+----------------+--------------+------------+
 
 
 
+-------------------------------------+
| No recent travel history available. |
+-------------------------------------+
 documented as of this encounter
 
 Plan of Treatment
 
 
+--------+-----------+------------+----------------------+-------------------+
| Date   | Type      | Specialty  | Care Team            | Description       |
+--------+-----------+------------+----------------------+-------------------+
| / | Office    | Cardiology |   HellalfredoTonia,    |                   |
|    | Visit     |            | ARNP  [address]   |                   |
|        |           |            | St  [place], [place]  |                   |
|        |           |            | [address]  [phone]  |                   |
|        |           |            |  [phone] (Fax)  |                   |
+--------+-----------+------------+----------------------+-------------------+
| / | Hospital  | Radiology  |   Popeye Townsend, |                   |
|    | Encounter |            |  MD  [address] |                   |
|        |           |            |  St.  [place],   |                   |
|        |           |            | [place] [address]             |                   |
|        |           |            | [phone]         |                   |
|        |           |            | [phone] (Fax)   |                   |
+--------+-----------+------------+----------------------+-------------------+
| / | Surgery   | Radiology  |   Popeye Townsend, | CV EP PPM SYSTEM  |
 
|    |           |            |  MD  [address] | IMPLANT           |
|        |           |            |  St.  [place],   |                   |
|        |           |            | [place] [address]             |                   |
|        |           |            | [phone]         |                   |
|        |           |            | [phone] (Fax)   |                   |
+--------+-----------+------------+----------------------+-------------------+
| 02/10/ | Clinical  | Cardiology |                      |                   |
|    | Support   |            |                      |                   |
+--------+-----------+------------+----------------------+-------------------+
| / | Office    | Cardiology |   Tonia Wilson,    |                   |
|    | Visit     |            | ARNP  [address]   |                   |
|        |           |            | [place]  |                   |
|        |           |            | [address]  [phone]  |                   |
|        |           |            |  [phone] (Fax)  |                   |
+--------+-----------+------------+----------------------+-------------------+
| / | Off-Site  | Nephrology |   Marzena Moser  |                   |
|    | Visit     |            | DO ETIENNE  [address] [place] [address]      |                   |
|        |           |            | [address]      |                   |
|        |           |            | [place]      |                   |
|        |           |            | [address]  [phone]  |                   |
|        |           |            |  [phone] (Fax)  |                   |
+--------+-----------+------------+----------------------+-------------------+
 documented as of this encounter
 
 Visit Diagnoses
 Not on filedocumented in this encounter

## 2020-01-08 NOTE — XMS
Encounter Summary
  Created on: 2020
 
 Viviana Ricci
 External Reference #: [id number]
 : 47
 Sex: Female
 
 Demographics
 
 
+-----------------------+----------------------+
| Address               | [address]      |
|                       | [place]  [address] |
+-----------------------+----------------------+
| Home Phone            | [phone]      |
+-----------------------+----------------------+
| Preferred Language    | Unknown              |
+-----------------------+----------------------+
| Marital Status        | Single               |
+-----------------------+----------------------+
| Tenriism Affiliation | [id number]                 |
+-----------------------+----------------------+
| Race                  | Unknown              |
+-----------------------+----------------------+
| Ethnic Group          | Unknown              |
+-----------------------+----------------------+
 
 
 Author
 
 
+--------------+--------------------------------------------+
| Author       | [facility]  |
|              | and [facility]                                |
+--------------+--------------------------------------------+
| Organization | [facility]  |
|              | and [facility]                                |
+--------------+--------------------------------------------+
| Address      | Unknown                                    |
+--------------+--------------------------------------------+
| Phone        | Unavailable                                |
+--------------+--------------------------------------------+
 
 
 
 Support
 
 
+----------------+--------------+---------------------+-----------------+
| Name           | Relationship | Address             | Phone           |
+----------------+--------------+---------------------+-----------------+
| Ada/Ed Radhames | ECON         | [address]              | [phone] |
|                |              | [place]  |                 |
|                |              |  [id number]              |                 |
+----------------+--------------+---------------------+-----------------+
 
 
 
 
 Care Team Providers
 
 
+-----------------------+------+-------------+
| Care Team Member Name | Role | Phone       |
+-----------------------+------+-------------+
 PCP  | Unavailable |
+-----------------------+------+-------------+
 
 
 
 Reason for Visit
 
 
+--------------------+--------------+
| Reason             | Comments     |
+--------------------+--------------+
| Pulmonary Disease  | Consult/COPD |
| Appointment        |              |
+--------------------+--------------+
 Evaluate & Treat (Routine)
 
+--------+--------+-------------+--------------+--------------+---------------+
| Status | Reason | Specialty   | Diagnoses /  | Referred By  | Referred To   |
|        |        |             | Procedures   | Contact      | Contact       |
+--------+--------+-------------+--------------+--------------+---------------+
| Closed |        | Pulmonology |   Diagnoses  |   Provider,  |   Fabrice   |
|        |        |             |  CONS/COPD & | Authorizing  | Andres         |
|        |        |             |  JACK/SELF    |              | MD [place]  |
|        |        |             | REF/LAST     |              |  [address]  |
|        |        |             | SEEN BY    |              | S  [place],   |
|        |        |             | [place]   |              | [place] [address]      |
|        |        |             | 2014/POSS |              | Phone:        |
|        |        |             | [employer]   |              | [phone]  |
|        |        |             | [facility] |              |  Fax:         |
|        |        |             |              |              | [phone]  |
|        |        |             | Procedures   |              |               |
|        |        |             | NEW PATIENT  |              |               |
+--------+--------+-------------+--------------+--------------+---------------+
 
 
 
 
 Encounter Details
 
 
+--------+---------+----------------------+----------------------+----------------------+
| Date   | Type    | Department           | Care Team            | Description          |
+--------+---------+----------------------+----------------------+----------------------+
| 08/15/ | Office  |   [facility] [place]          |   Andres Avina    | Chronic diastolic    |
| 2018   | Visit   | PULMONARY  [address]     | [address]    | CHF (congestive      |
|        |         | [place], | [address]  [place],  | heart failure), NYHA |
|        |         |  [place] [address]       | [place] [address]             |  class 3 (HCC)       |
|        |         | [phone]         | [phone]         | (Primary Dx);        |
|        |         |                      | [phone] (Fax)   | Obstructive sleep    |
|        |         |                      |                      | apnea syndrome;      |
|        |         |                      |                      | Chronic bronchitis,  |
|        |         |                      |                      | mucopurulent (HCC)   |
+--------+---------+----------------------+----------------------+----------------------+
 
 
 
 
 Social History
 
 
+--------------+-------+-----------+--------+------+
| Tobacco Use  | Types | Packs/Day | Years  | Date |
|              |       |           | Used   |      |
+--------------+-------+-----------+--------+------+
| Never Smoker |       |           |        |      |
+--------------+-------+-----------+--------+------+
 
 
 
+---------------------+---+---+---+
| Smokeless Tobacco:  |   |   |   |
| Never Used          |   |   |   |
+---------------------+---+---+---+
 
 
 
+---------------------------------------------------------------+
| Comments: some second hand smoke exposure, but fairly minimal |
+---------------------------------------------------------------+
 
 
 
+-------------+-------------+---------+----------+
| Alcohol Use | Drinks/Week | oz/Week | Comments |
+-------------+-------------+---------+----------+
| No          |             |         |          |
+-------------+-------------+---------+----------+
 
 
 
+------------------+---------------+
| Sex Assigned at  | Date Recorded |
| Birth            |               |
+------------------+---------------+
| Not on file      |               |
+------------------+---------------+
 
 
 
+----------------+-------------+-------------+
| Job Start Date | Occupation  | Industry    |
+----------------+-------------+-------------+
| Not on file    | Not on file | Not on file |
+----------------+-------------+-------------+
 
 
 
+----------------+--------------+------------+
| Travel History | Travel Start | Travel End |
+----------------+--------------+------------+
 
 
 
+-------------------------------------+
 
| No recent travel history available. |
+-------------------------------------+
 documented as of this encounter
 
 Last Filed Vital Signs
 
 
+-------------------+-------------------+----------------------+----------+
| Vital Sign        | Reading           | Time Taken           | Comments |
+-------------------+-------------------+----------------------+----------+
| Blood Pressure    | 110/60            | 08/15/2018 11:01 AM  |          |
|                   |                   | PDT                  |          |
+-------------------+-------------------+----------------------+----------+
| Pulse             | 66                | 08/15/2018 11:01 AM  |          |
|                   |                   | PDT                  |          |
+-------------------+-------------------+----------------------+----------+
| Temperature       | 36.7   C (98   F) | 08/15/2018 11:01 AM  |          |
|                   |                   | PDT                  |          |
+-------------------+-------------------+----------------------+----------+
| Respiratory Rate  | -                 | -                    |          |
+-------------------+-------------------+----------------------+----------+
| Oxygen Saturation | 96%               | 08/15/2018 11:01 AM  | Room Air |
|                   |                   | PDT                  |          |
+-------------------+-------------------+----------------------+----------+
| Inhaled Oxygen    | -                 | -                    |          |
| Concentration     |                   |                      |          |
+-------------------+-------------------+----------------------+----------+
| Weight            | 122.8 kg (270 lb  | 08/15/2018 11:01 AM  |          |
|                   | 11.6 oz)          | PDT                  |          |
+-------------------+-------------------+----------------------+----------+
| Height            | 167.6 cm (5' 6")  | 08/15/2018 11:01 AM  |          |
|                   |                   | PDT                  |          |
+-------------------+-------------------+----------------------+----------+
| Body Mass Index   | 43.7              | 08/15/2018 11:01 AM  |          |
|                   |                   | PDT                  |          |
+-------------------+-------------------+----------------------+----------+
 documented in this encounter
 
 Progress Notes
 Andres Avina MD - 08/15/2018 11:00 AM PDT72-year-old never smoker with annoyin
g
 Chronic sputum production, obstructive sleep apnea on CPAP with oxygen, and chronic leg rc
ma and cardiomegaly
 
 She is stable from the point of view of major illness.  She has a kidney transplant and use
s mycophenolate and low-dose prednisone.
 
 From the pulmonary point of view, she uses CPAP full face mask with oxygen bled in, and was
 told it needs to be renewed by a physician.  She finds it ies out her mouth, and I recomm
ended she try nasal pillows CPAP, with oxygen, which she said she has not tried.
 
 She is never smoked, never wheezed, never had hayfever.  But she tells me she remembers fro
m , through the present time, but she has sputum production from her throat.  It is not 
dripping down from her nose and not coming up from her lung.  She has a lot of it and coughs
 all the time.  She has a dog at home but being around the dog does not make it worse; she's
 had the dog for years.  It sounds like inflammatory sputum.  She says occasionally it is gr
een but usually it is clear.  She does nothing for it but would like to try something to rel
ieve it.
 
 She keeps chronic leg swelling, used to take Lasix 40 mg for 4, but that was when her hands
 
 were swollen also.  She very rarely takes the Lasix.  She says her legs swell when she eats
 more salt.  She seems to view this as well as additional, and optional, rather than importa
nt health issue.  Yet her chest x-ray of 2011 showed massive dangerous cardiomegaly, and
 I emphasized to her that she needs to try to keep a small heart and avoiding salt so the La
six will work, taking the Lasix, is important for her health.
 
 She has no drug allergies.  She worked as a hairdresser.  She lives with her dog for 8 year
s and no one else.
 
 Physical exam: Large and overweight woman with a walker with difficulty getting up, cheery 
in no distress.  Weight 222.8 kg, blood pressure 110/60, pulse 66 regular, temperature 98.0 
oxygen saturation 96% on room air.  HEENT shows reactive pupils, pink conjunctiva, normal or
opharynx.  The neck is normal.  There is no JVD evident.  She is a very large circumference 
neck.  There is no adenopathy.  The lung fields appear clear with no rales although breath s
ounds are hard to hear through the chest wall.  Cardiac rhythm is regular with no audible mu
rmur, but those heart sounds are difficult to hear also.  The abdomen is benign but obese.  
Her right ankle is has 2+ edema, the left has 3+. 
 
 Laboratory evaluation: Today's PA and lateral chest x-ray is similar to 2016, exce
pt today's x-ray shows more pulmonary vascular congestion and cephalization.  The most recen
t preivous chest x-rays 2016 which shows sternal wires and cardiomegaly.  Echocardi
ogram of April 15, 2014 shows an enlarged left atrium normal left ventricle normal or a and 
RV.  The family 2011 chest x-ray shows massive cardiomegaly.
 
 Impression and plan:
 
 1.  Chronic bronchitis and mucus production: I gave her prescription for Qvar 80 respi clic
k, 2 separate puffs twice daily.  I told her to try that for 3 weeks and see if it improved 
her mucus production, if it does, I told her how to very gradually, only a week at a time, t
aper to perhaps as low as 2 puffs per day, either one twice daily or 2 in the morning.
 
 2.  Renewal of need for CPAP with oxygen bled in: I confirm this.  She uses it and that wor
ks for her.  However, I would like to see if nasal CPAP mask with oxygen bled in will be mor
e comfortable.
 
 3.  Undertreated partially compensated CHF: I stress for her the importance of avoiding sal
t and taking the Lasix regularly.  The chest x-ray today is a new baseline.
 
 We spent 35 minutes face-to-face, at least half in counseling.  Word processing was used an
d I apologize for mistakes.
 
 Andres Avina M.D.
 Pulmonary critical careElectronically signed by Andres Avina MD at 08/15/2018  
1:04 PM PDTdocumented in this encounter
 
 Plan of Treatment
 
 
+--------+-----------+------------+----------------------+-------------------+
| Date   | Type      | Specialty  | Care Team            | Description       |
+--------+-----------+------------+----------------------+-------------------+
| / | Office    | Cardiology |   Arlen Wilsona,    |                   |
|    | Visit     |            | [address]   |                   |
|        |           |            | St  [place], [place]  |                   |
|        |           |            | [address]  [phone]  |                   |
|        |           |            |  [phone] (Fax)  |                   |
+--------+-----------+------------+----------------------+-------------------+
| / | Hospital  | Radiology  |   Popeye Townsend, |                   |
|    | Encounter |            |  MD  [address] |                   |
|        |           |            |  [place],   |                   |
 
|        |           |            | [place] [address]             |                   |
|        |           |            | [phone]         |                   |
|        |           |            | [phone] (Fax)   |                   |
+--------+-----------+------------+----------------------+-------------------+
| / | Surgery   | Radiology  |   Popeye Townsend, | CV EP PPM SYSTEM  |
|    |           |            |  MD  [address] | IMPLANT           |
|        |           |            |  St.  [place],   |                   |
|        |           |            | [place] [address]             |                   |
|        |           |            | [phone]         |                   |
|        |           |            | [phone] (Fax)   |                   |
+--------+-----------+------------+----------------------+-------------------+
| 02/10/ | Clinical  | Cardiology |                      |                   |
|    | Support   |            |                      |                   |
+--------+-----------+------------+----------------------+-------------------+
| / | Office    | Cardiology |   KatieTonia,    |                   |
|    | Visit     |            | [facility]  [address]   |                   |
|        |           |            | St  [place]  |                   |
|        |           |            | [address]  [phone]  |                   |
|        |           |            |  [phone] (Fax)  |                   |
+--------+-----------+------------+----------------------+-------------------+
| / | Off-Site  | Nephrology |   Marzena Moser  |                   |
2020   | Visit     |            | DO ETIENNE  [address] [place] [address]      |                   |
|        |           |            | [address]      |                   |
|        |           |            | [place]      |                   |
|        |           |            | [address]  [phone]  |                   |
|        |           |            |  [phone] (Fax)  |                   |
+--------+-----------+------------+----------------------+-------------------+
 documented as of this encounter
 
 Results
 XR Chest PA and Lateral (08/15/2018 12:28 PM PDT)
 
+----------+
| Specimen |
+----------+
|          |
+----------+
 
 
 
+------------------------------------------------------------------------+---------------+
| Narrative                                                              | Performed At  |
+------------------------------------------------------------------------+---------------+
|   CLINICAL INFORMATION: under-treated partially compensated CHF.       |   [facility] |
| COMPARISON: 3 2016.     FINDINGS:   Frontal and lateral views of the   |               |
| chest.      Median sternotomy changes.     Lungs: No focal airspace    |               |
| disease, pleural effusion, or pneumothorax.   Minimal  bilateral       |               |
| basilar dependent atelectasis versus scarring.     Heart/mediastinum:  |               |
| Borderline cardiomegaly. No mediastinal widening.     Bones: No acute  |               |
| osseous abnormality appreciated.     IMPRESSION - Borderline           |               |
| cardiomegaly.   No acute pulmonary disease.     Dictated and Signed    |               |
| by: Jorge Thomas MD    Electronically signed: 8/15/2018 2:16 PM    |               |
+------------------------------------------------------------------------+---------------+
 
 
 
+------------------------------------------------------------------------------------------+
| Procedure Note                                                                           |
+------------------------------------------------------------------------------------------+
|   Ralf, Rad Results In - 08/15/2018  2:19 PM PDT  CLINICAL INFORMATION: under-treated     |
 
| partially compensated CHF.COMPARISON: 3 2016.FINDINGS: Frontal and lateral views of the  |
| chest. Median sternotomy changes.Lungs: No focal airspace disease, pleural effusion, or  |
| pneumothorax.  Minimalbilateral basilar dependent atelectasis versus                     |
| scarring.Heart/mediastinum: Borderline cardiomegaly. No mediastinal widening.Bones: No   |
| acute osseous abnormality appreciated.IMPRESSION - Borderline cardiomegaly.  No acute    |
| pulmonary disease.Dictated and Signed by: Jorge Thomas MD  Electronically signed:     |
| 8/15/2018 2:16 PM                                                                        |
|                                                                                          |
|Lungs: No focal airspace disease, pleural effusion, or pneumothorax.  Minimal             |
|bilateral basilar dependent atelectasis versus scarring.                                  |
|                                                                                          |
|Heart/mediastinum: Borderline cardiomegaly. No mediastinal widening.                      |
|                                                                                          |
|Bones: No acute osseous abnormality appreciated.                                          |
|                                                                                          |
|IMPRESSION - Borderline cardiomegaly.  No acute pulmonary disease.                        |
|                                                                                          |
|Dictated and Signed by: Jorge Thomas MD                                                |
| Electronically signed: 8/15/2018 2:16 PM                                                 |
+------------------------------------------------------------------------------------------+
 
 
 
+---------------+---------+--------------------+--------------+
| Performing    | Address | City/State/Zipcode | Phone Number |
| Organization  |         |                    |              |
+---------------+---------+--------------------+--------------+
|   [facility] |         |                    |              |
+---------------+---------+--------------------+--------------+
 documented in this encounter
 
 Visit Diagnoses
 
 
+----------------------------------------------------------------------------------+
| Diagnosis                                                                        |
+----------------------------------------------------------------------------------+
|   Chronic diastolic CHF (congestive heart failure), NYHA class 3 (HCC) - Primary |
+----------------------------------------------------------------------------------+
|   Obstructive sleep apnea syndrome  Obstructive sleep apnea (adult) (pediatric)  |
+----------------------------------------------------------------------------------+
|   Chronic bronchitis, mucopurulent (HCC)  Mucopurulent chronic bronchitis        |
+----------------------------------------------------------------------------------+
 documented in this encounter

## 2020-01-08 NOTE — XMS
Encounter Summary
  Created on: 2020
 
 Viviana Ricci
 External Reference #: [id number]
 : 47
 Sex: Female
 
 Demographics
 
 
+-----------------------+----------------------+
| Address               | [address]      |
|                       | [place]  [address] |
+-----------------------+----------------------+
| Home Phone            | [phone]      |
+-----------------------+----------------------+
| Preferred Language    | Unknown              |
+-----------------------+----------------------+
| Marital Status        | Single               |
+-----------------------+----------------------+
| Taoism Affiliation | [id number]                 |
+-----------------------+----------------------+
| Race                  | Unknown              |
+-----------------------+----------------------+
| Ethnic Group          | Unknown              |
+-----------------------+----------------------+
 
 
 Author
 
 
+--------------+--------------------------------------------+
| Author       | [facility]  |
|              | and [facility]                                |
+--------------+--------------------------------------------+
| Organization | [facility]  |
|              | and [facility]                                |
+--------------+--------------------------------------------+
| Address      | Unknown                                    |
+--------------+--------------------------------------------+
| Phone        | Unavailable                                |
+--------------+--------------------------------------------+
 
 
 
 Support
 
 
+----------------+--------------+---------------------+-----------------+
| Name           | Relationship | Address             | Phone           |
+----------------+--------------+---------------------+-----------------+
| Ada/Ed Radhames | ECON         | [address]              | [phone] |
|                |              | [place]  |                 |
|                |              |  [id number]              |                 |
+----------------+--------------+---------------------+-----------------+
 
 
 
 
 Care Team Providers
 
 
+-----------------------+------+-------------+
| Care Team Member Name | Role | Phone       |
+-----------------------+------+-------------+
 PCP  | Unavailable |
+-----------------------+------+-------------+
 
 
 
 Encounter Details
 
 
+--------+----------+---------------------+----------------------+-------------+
| Date   | Type     | Department          | Care Team            | Description |
+--------+----------+---------------------+----------------------+-------------+
| / | Abstract |   [facility]  [place]         |   Marzena Moser  |             |
|    |          | NEPHROLOGY  [address]   | M, DO  [address]      |             |
|        |          | [address]   | [address]      |             |
|        |          | [place], [place]     | [place]      |             |
|        |          | [id number]          | [address]  [phone]  |             |
|        |          | [phone]        |  [phone] (Fax)  |             |
+--------+----------+---------------------+----------------------+-------------+
 
 
 
 Social History
 
 
+--------------+-------+-----------+--------+------+
| Tobacco Use  | Types | Packs/Day | Years  | Date |
|              |       |           | Used   |      |
+--------------+-------+-----------+--------+------+
| Never Smoker |       |           |        |      |
+--------------+-------+-----------+--------+------+
 
 
 
+---------------------+---+---+---+
| Smokeless Tobacco:  |   |   |   |
| Never Used          |   |   |   |
+---------------------+---+---+---+
 
 
 
+---------------------------------------------------------------+
| Comments: some second hand smoke exposure, but fairly minimal |
+---------------------------------------------------------------+
 
 
 
+-------------+-------------+---------+----------+
| Alcohol Use | Drinks/Week | oz/Week | Comments |
+-------------+-------------+---------+----------+
| No          |             |         |          |
+-------------+-------------+---------+----------+
 
 
 
 
+------------------+---------------+
| Sex Assigned at  | Date Recorded |
| Birth            |               |
+------------------+---------------+
| Not on file      |               |
+------------------+---------------+
 
 
 
+----------------+-------------+-------------+
| Job Start Date | Occupation  | Industry    |
+----------------+-------------+-------------+
| Not on file    | Not on file | Not on file |
+----------------+-------------+-------------+
 
 
 
+----------------+--------------+------------+
| Travel History | Travel Start | Travel End |
+----------------+--------------+------------+
 
 
 
+-------------------------------------+
| No recent travel history available. |
+-------------------------------------+
 documented as of this encounter
 
 Plan of Treatment
 
 
+--------+-----------+------------+----------------------+-------------------+
| Date   | Type      | Specialty  | Care Team            | Description       |
+--------+-----------+------------+----------------------+-------------------+
| / | Office    | Cardiology |   Tonia Wilson,    |                   |
|    | Visit     |            | ARNJESSICA  [address]   |                   |
|        |           |            | [place]  |                   |
|        |           |            | [address]  [phone]  |                   |
|        |           |            |  [phone] (Fax)  |                   |
+--------+-----------+------------+----------------------+-------------------+
| / | Hospital  | Radiology  |   Popeye Townsend, |                   |
|    | Encounter |            |  MD  [address] |                   |
|        |           |            |  St.  [place],   |                   |
|        |           |            | [place] [address]             |                   |
|        |           |            | [phone]         |                   |
|        |           |            | [phone] (Fax)   |                   |
+--------+-----------+------------+----------------------+-------------------+
| / | Surgery   | Radiology  |   Popeye Townsend, | CV EP PPM SYSTEM  |
|    |           |            |  MD  [address] | IMPLANT           |
|        |           |            |  St.  [place],   |                   |
|        |           |            | [place] [address]             |                   |
|        |           |            | [phone]         |                   |
|        |           |            | [phone] (Fax)   |                   |
+--------+-----------+------------+----------------------+-------------------+
| 02/10/ | Clinical  | Cardiology |                      |                   |
|    | Support   |            |                      |                   |
+--------+-----------+------------+----------------------+-------------------+
| / | Office    | Cardiology |   Tonia Wilson,    |                   |
|    | Visit     |            | ARNP  [address]   |                   |
 
|        |           |            | St  [place], [place]  |                   |
|        |           |            | [address]  [phone]  |                   |
|        |           |            |  [phone] (Fax)  |                   |
+--------+-----------+------------+----------------------+-------------------+
| / | Off-Site  | Nephrology |   Marzena Moser  |                   |
|    | Visit     |            | DO ETIENNE  [address] [place] [address]      |                   |
|        |           |            | [address]      |                   |
|        |           |            | [place]      |                   |
|        |           |            | [address]  [phone]  |                   |
|        |           |            |  [phone] (Fax)  |                   |
+--------+-----------+------------+----------------------+-------------------+
 documented as of this encounter
 
 Procedures
 
 
+--------------------+--------+------------+----------------------+----------------------+
| Procedure Name     | Priori | Date/Time  | Associated Diagnosis | Comments             |
|                    | ty     |            |                      |                      |
+--------------------+--------+------------+----------------------+----------------------+
| EXTERNAL LAB: BILLY  | Routin | 2014 |                      |   Results for this   |
|                    | e      |            |                      | procedure are in the |
|                    |        |            |                      |  results section.    |
+--------------------+--------+------------+----------------------+----------------------+
| EXTERNAL LAB: AST  | Routin | 2014 |                      |   Results for this   |
|                    | e      |            |                      | procedure are in the |
|                    |        |            |                      |  results section.    |
+--------------------+--------+------------+----------------------+----------------------+
| EXTERNAL LAB: ALT  | Routin | 2014 |                      |   Results for this   |
|                    | e      |            |                      | procedure are in the |
|                    |        |            |                      |  results section.    |
+--------------------+--------+------------+----------------------+----------------------+
| EXTERNAL LAB: BARBARA | Routin | 2014 |                      |   Results for this   |
|                    | e      |            |                      | procedure are in the |
|                    |        |            |                      |  results section.    |
+--------------------+--------+------------+----------------------+----------------------+
| EXTERNAL LAB:      | Routin | 2014 |                      |   Results for this   |
| CREATININE         | e      |            |                      | procedure are in the |
|                    |        |            |                      |  results section.    |
+--------------------+--------+------------+----------------------+----------------------+
| CMPI               | Routin | 2014 |                      |   Results for this   |
|                    | e      |            |                      | procedure are in the |
|                    |        |            |                      |  results section.    |
+--------------------+--------+------------+----------------------+----------------------+
 documented in this encounter
 
 Results
 CMP/ISTAT (2014)
 
+-------------+-------+-----------------+------------+--------------+
| Component   | Value | Ref Range       | Performed  | Pathologist  |
|             |       |                 | At         | Signature    |
+-------------+-------+-----------------+------------+--------------+
| Na          | 142   | mmol/L          |            |              |
+-------------+-------+-----------------+------------+--------------+
| K           | 5.4   | mmol/L          |            |              |
+-------------+-------+-----------------+------------+--------------+
| Cl          | 108   | mmol/L          |            |              |
+-------------+-------+-----------------+------------+--------------+
| CO2         | 22    | mmol/L          |            |              |
 
+-------------+-------+-----------------+------------+--------------+
| BUN         | 40    | mg/dL           |            |              |
+-------------+-------+-----------------+------------+--------------+
| Glucose     | 142   | mg/dL           |            |              |
+-------------+-------+-----------------+------------+--------------+
| Calcium     | 10.4  | mg/dL           |            |              |
+-------------+-------+-----------------+------------+--------------+
| MCV         | 103.9 | fL              |            |              |
+-------------+-------+-----------------+------------+--------------+
| Bilirubin   | 0.6   | mg/dL           |            |              |
| Total       |       |                 |            |              |
+-------------+-------+-----------------+------------+--------------+
| Alkaline    | 100   | U/L             |            |              |
| Phosphatase |       |                 |            |              |
+-------------+-------+-----------------+------------+--------------+
| Albumin     | 4.0   | 3.3 - 4.8 g/dL  |            |              |
+-------------+-------+-----------------+------------+--------------+
| Magnesium   | 1.6   | mg/dL           |            |              |
+-------------+-------+-----------------+------------+--------------+
| PTH INTACT  | 218.9 | pg/mL           |            |              |
+-------------+-------+-----------------+------------+--------------+
| Tacrolimus  | 5.2   |                 |            |              |
| Level       |       |                 |            |              |
+-------------+-------+-----------------+------------+--------------+
| Phosphorus  | 3.0   | 2.6 - 4.4 mg/dL |            |              |
+-------------+-------+-----------------+------------+--------------+
 
 
 
+-----------------+
| Specimen        |
+-----------------+
| Blood specimen  |
| (specimen)      |
+-----------------+
 External Lab: CBC (2014)
 
+-----------+-------+-----------+------------+--------------+
| Component | Value | Ref Range | Performed  | Pathologist  |
|           |       |           | At         | Signature    |
+-----------+-------+-----------+------------+--------------+
| WBC,      | 6.3   | 4 - 11    | EXTERNAL   |              |
| External  |       |           | LAB        |              |
+-----------+-------+-----------+------------+--------------+
| HGB,      | 13.5  | 12 - 16   | EXTERNAL   |              |
| External  |       |           | LAB        |              |
+-----------+-------+-----------+------------+--------------+
| HCT,      | 40.8  | 35 - 45   | EXTERNAL   |              |
| External  |       |           | LAB        |              |
+-----------+-------+-----------+------------+--------------+
| PLT,      | 180   | 140 - 440 | EXTERNAL   |              |
| External  |       |           | LAB        |              |
+-----------+-------+-----------+------------+--------------+
 
 
 
+--------------------------+
| Resulting Agency Comment |
+--------------------------+
|   [facility]              |
 
+--------------------------+
 
 
 
+----------------+---------+--------------------+--------------+
| Performing     | Address | City/State/Zipcode | Phone Number |
| Organization   |         |                    |              |
+----------------+---------+--------------------+--------------+
|   EXTERNAL LAB |         |                    |              |
+----------------+---------+--------------------+--------------+
 External Lab: AST (2014)
 
+-----------+-------+-----------+------------+--------------+
| Component | Value | Ref Range | Performed  | Pathologist  |
|           |       |           | At         | Signature    |
+-----------+-------+-----------+------------+--------------+
| AST,      | 24    | 0 - 40    | EXTERNAL   |              |
| External  |       |           | LAB        |              |
+-----------+-------+-----------+------------+--------------+
 
 
 
+-----------------+
| Specimen        |
+-----------------+
| Blood specimen  |
| (specimen)      |
+-----------------+
 
 
 
+--------------------------+
| Resulting Agency Comment |
+--------------------------+
|   [facility]              |
+--------------------------+
 
 
 
+----------------+---------+--------------------+--------------+
| Performing     | Address | City/State/Zipcode | Phone Number |
| Organization   |         |                    |              |
+----------------+---------+--------------------+--------------+
|   EXTERNAL LAB |         |                    |              |
+----------------+---------+--------------------+--------------+
 External Lab: ALT (2014)
 
+-----------+-------+-----------+------------+--------------+
| Component | Value | Ref Range | Performed  | Pathologist  |
|           |       |           | At         | Signature    |
+-----------+-------+-----------+------------+--------------+
| ALT,      | 17    | 0 - 40    | EXTERNAL   |              |
| External  |       |           | LAB        |              |
+-----------+-------+-----------+------------+--------------+
 
 
 
+-----------------+
| Specimen        |
+-----------------+
 
| Blood specimen  |
| (specimen)      |
+-----------------+
 
 
 
+--------------------------+
| Resulting Agency Comment |
+--------------------------+
|   [facility]              |
+--------------------------+
 
 
 
+----------------+---------+--------------------+--------------+
| Performing     | Address | City/State/Zipcode | Phone Number |
| Organization   |         |                    |              |
+----------------+---------+--------------------+--------------+
|   EXTERNAL LAB |         |                    |              |
+----------------+---------+--------------------+--------------+
 External Lab: eGFR (2014)
 
+------------+--------+-----------+------------+--------------+
| Component  | Value  | Ref Range | Performed  | Pathologist  |
|            |        |           | At         | Signature    |
+------------+--------+-----------+------------+--------------+
| eGFR,      | 36 (A) | 60        | EXTERNAL   |              |
| External   |        |           | LAB        |              |
+------------+--------+-----------+------------+--------------+
| eGFR,      |        |           | EXTERNAL   |              |
|     |        |           | LAB        |              |
| [employer],  |        |           |            |              |
| External   |        |           |            |              |
+------------+--------+-----------+------------+--------------+
 
 
 
+-----------------+
| Specimen        |
+-----------------+
| Blood specimen  |
| (specimen)      |
+-----------------+
 
 
 
+--------------------------+
| Resulting Agency Comment |
+--------------------------+
|   [facility]              |
+--------------------------+
 
 
 
+----------------+---------+--------------------+--------------+
| Performing     | Address | City/State/Zipcode | Phone Number |
| Organization   |         |                    |              |
+----------------+---------+--------------------+--------------+
|   EXTERNAL LAB |         |                    |              |
+----------------+---------+--------------------+--------------+
 
 External Lab: Creatinine (2014)
 
+-------------+----------+-----------+------------+--------------+
| Component   | Value    | Ref Range | Performed  | Pathologist  |
|             |          |           | At         | Signature    |
+-------------+----------+-----------+------------+--------------+
| Creatinine, | 1.44 (A) | 0.6 - 1.3 | EXTERNAL   |              |
|  External   |          |           | LAB        |              |
+-------------+----------+-----------+------------+--------------+
 
 
 
+-----------------+
| Specimen        |
+-----------------+
| Blood specimen  |
| (specimen)      |
+-----------------+
 
 
 
+--------------------------+
| Resulting Agency Comment |
+--------------------------+
|   [facility]              |
+--------------------------+
 
 
 
+----------------+---------+--------------------+--------------+
| Performing     | Address | City/State/Zipcode | Phone Number |
| Organization   |         |                    |              |
+----------------+---------+--------------------+--------------+
|   EXTERNAL LAB |         |                    |              |
+----------------+---------+--------------------+--------------+
 documented in this encounter
 
 Visit Diagnoses
 Not on filedocumented in this encounter

## 2020-01-08 NOTE — XMS
Encounter Summary
  Created on: 2020
 
 Viviana Ricci
 External Reference #: [id number]
 : 47
 Sex: Female
 
 Demographics
 
 
+-----------------------+----------------------+
| Address               | [address]      |
|                       | [place]  [address] |
+-----------------------+----------------------+
| Home Phone            | [phone]      |
+-----------------------+----------------------+
| Preferred Language    | Unknown              |
+-----------------------+----------------------+
| Marital Status        | Single               |
+-----------------------+----------------------+
| Hoahaoism Affiliation | [id number]                 |
+-----------------------+----------------------+
| Race                  | Unknown              |
+-----------------------+----------------------+
| Ethnic Group          | Unknown              |
+-----------------------+----------------------+
 
 
 Author
 
 
+--------------+--------------------------------------------+
| Author       | [facility]  |
|              | and [facility]                                |
+--------------+--------------------------------------------+
| Organization | [facility]  |
|              | and [facility]                                |
+--------------+--------------------------------------------+
| Address      | Unknown                                    |
+--------------+--------------------------------------------+
| Phone        | Unavailable                                |
+--------------+--------------------------------------------+
 
 
 
 Support
 
 
+----------------+--------------+---------------------+-----------------+
| Name           | Relationship | Address             | Phone           |
+----------------+--------------+---------------------+-----------------+
| Ada/Ed Radhames | ECON         | [address]              | [phone] |
|                |              | [place]  |                 |
|                |              |  [id number]              |                 |
+----------------+--------------+---------------------+-----------------+
 
 
 
 
 Care Team Providers
 
 
+------------------------+------+-----------------+
| Care Team Member Name  | Role | Phone           |
+------------------------+------+-----------------+
| Marzena Moser PCP  | [phone] |
+------------------------+------+-----------------+
 
 
 
 Reason for Referral
 Diagnostic/Screening (Routine)
 
+--------+--------+-----------+--------------+--------------+---------------+
| Status | Reason | Specialty | Diagnoses /  | Referred By  | Referred To   |
|        |        |           | Procedures   | Contact      | Contact       |
+--------+--------+-----------+--------------+--------------+---------------+
| Closed |        | Radiology |   Diagnoses  |   [place],  |   [facility]    |
|        |        |           |  Coronary    | Tonia, ARNP   | [address]  |
|        |        |           | artery       | [address] |  [place], |
|        |        |           | disease      |  St  [place]   |  [place]           |
|        |        |           | involving    | [place]    | [address]    |
|        |        |           | native       | [address]        | Phone:        |
|        |        |           | coronary     | Phone:       | [phone]  |
|        |        |           | artery of    | [phone] |  Fax:         |
|        |        |           | native heart |   Fax:       | [phone]  |
|        |        |           |  without     | [phone] |               |
|        |        |           | angina       |              |               |
|        |        |           | pectoris     |              |               |
|        |        |           | Hypertension |              |               |
|        |        |           | , essential  |              |               |
|        |        |           |  Mixed       |              |               |
|        |        |           | hyperlipidem |              |               |
|        |        |           | ia  Aortic   |              |               |
|        |        |           | valve        |              |               |
|        |        |           | insufficienc |              |               |
|        |        |           | y,           |              |               |
|        |        |           | unspecified  |              |               |
|        |        |           | etiology     |              |               |
|        |        |           | Pulmonary    |              |               |
|        |        |           | hypertension |              |               |
|        |        |           |  (HCC)       |              |               |
|        |        |           | Procedures   |              |               |
|        |        |           | ECHO         |              |               |
|        |        |           | Complete  [place] |              |               |
|        |        |           |  ECHO HEART  |              |               |
|        |        |           | XTHORACIC,CO |              |               |
|        |        |           | MPLETE W     |              |               |
|        |        |           | DOPPLER  [place]  |              |               |
|        |        |           | ECHO HEART   |              |               |
|        |        |           | XTHORACIC,CO |              |               |
|        |        |           | MPLETE, W/O  |              |               |
|        |        |           | DOPPLER      |              |               |
+--------+--------+-----------+--------------+--------------+---------------+
 
 
 
 
 
 Reason for Visit
 Diagnostic/Screening (Routine)
 
+--------+--------+-----------+--------------+--------------+---------------+
| Status | Reason | Specialty | Diagnoses /  | Referred By  | Referred To   |
|        |        |           | Procedures   | Contact      | Contact       |
+--------+--------+-----------+--------------+--------------+---------------+
| Closed |        | Radiology |   Diagnoses  |   [place],  |   [facility]    |
|        |        |           |  Coronary    | Tonia, ARNP   | [address]  |
|        |        |           | artery       | [address] |  [place], |
|        |        |           | disease      |  St  [place]   |  [place]           |
|        |        |           | involving    | [place]    | [address]    |
|        |        |           | native       | [address]        | Phone:        |
|        |        |           | coronary     | Phone:       | [phone]  |
|        |        |           | artery of    | [phone] |  Fax:         |
|        |        |           | native heart |   Fax:       | [phone]  |
|        |        |           |  without     | [phone] |               |
|        |        |           | angina       |              |               |
|        |        |           | pectoris     |              |               |
|        |        |           | Hypertension |              |               |
|        |        |           | , essential  |              |               |
|        |        |           |  Mixed       |              |               |
|        |        |           | hyperlipidem |              |               |
|        |        |           | ia  Aortic   |              |               |
|        |        |           | valve        |              |               |
|        |        |           | insufficienc |              |               |
|        |        |           | y,           |              |               |
|        |        |           | unspecified  |              |               |
|        |        |           | etiology     |              |               |
|        |        |           | Pulmonary    |              |               |
|        |        |           | hypertension |              |               |
|        |        |           |  (HCC)       |              |               |
|        |        |           | Procedures   |              |               |
|        |        |           | ECHO         |              |               |
|        |        |           | Complete  [place] |              |               |
|        |        |           |  ECHO HEART  |              |               |
|        |        |           | XTHORACIC,CO |              |               |
|        |        |           | MPLETE W     |              |               |
|        |        |           | DOPPLER  [place]  |              |               |
|        |        |           | ECHO HEART   |              |               |
|        |        |           | XTHOSTACEY,CO |              |               |
|        |        |           | MPLETE, W/O  |              |               |
|        |        |           | DOPPLER      |              |               |
+--------+--------+-----------+--------------+--------------+---------------+
 
 
 
 
 Encounter Details
 
 
+--------+-----------+----------------------+----------------------+----------------------+
| Date   | Type      | Department           | Care Team            | Description          |
+--------+-----------+----------------------+----------------------+----------------------+
| / | Hospital  |   [facility] |   Tonia Wilson,    | Coronary artery      |
| 2018   | Encounter |  [facility]  [address] ARNP  [address]   | disease involving    |
|        |           |  [address]  [place]       | St  [place]  | native coronary      |
|        |           | [place] [address] | [address]  [phone]  | artery of native     |
|        |           |   [phone]       |  [phone] (Fax)  | heart without angina |
|        |           |                      |                      |  pectoris;           |
 
|        |           |                      |                      | Hypertension,        |
|        |           |                      |                      | essential; Mixed     |
|        |           |                      |                      | hyperlipidemia;      |
|        |           |                      |                      | Aortic valve         |
|        |           |                      |                      | regurgitation;       |
|        |           |                      |                      | Pulmonary            |
|        |           |                      |                      | hypertension (HCC);  |
|        |           |                      |                      | PVC (premature       |
|        |           |                      |                      | ventricular          |
|        |           |                      |                      | contraction);        |
|        |           |                      |                      | Hypothyroidism,      |
|        |           |                      |                      | unspecified type;    |
|        |           |                      |                      | Vitamin D deficiency |
+--------+-----------+----------------------+----------------------+----------------------+
 
 
 
 Social History
 
 
+--------------+-------+-----------+--------+------+
| Tobacco Use  | Types | Packs/Day | Years  | Date |
|              |       |           | Used   |      |
+--------------+-------+-----------+--------+------+
| Never Smoker |       |           |        |      |
+--------------+-------+-----------+--------+------+
 
 
 
+---------------------+---+---+---+
| Smokeless Tobacco:  |   |   |   |
| Never Used          |   |   |   |
+---------------------+---+---+---+
 
 
 
+---------------------------------------------------------------+
| Comments: some second hand smoke exposure, but fairly minimal |
+---------------------------------------------------------------+
 
 
 
+-------------+-------------+---------+----------+
| Alcohol Use | Drinks/Week | oz/Week | Comments |
+-------------+-------------+---------+----------+
| No          |             |         |          |
+-------------+-------------+---------+----------+
 
 
 
+------------------+---------------+
| Sex Assigned at  | Date Recorded |
| Birth            |               |
+------------------+---------------+
| Not on file      |               |
+------------------+---------------+
 
 
 
+----------------+-------------+-------------+
 
| Job Start Date | Occupation  | Industry    |
+----------------+-------------+-------------+
| Not on file    | Not on file | Not on file |
+----------------+-------------+-------------+
 
 
 
+----------------+--------------+------------+
| Travel History | Travel Start | Travel End |
+----------------+--------------+------------+
 
 
 
+-------------------------------------+
| No recent travel history available. |
+-------------------------------------+
 documented as of this encounter
 
 Medications at Time of Discharge
 
 
+----------------------+----------------------+-----------+---------+----------+-----------+
| Medication           | Sig                  | Dispensed | Refills | Start    | End Date  |
|                      |                      |           |         | Date     |           |
+----------------------+----------------------+-----------+---------+----------+-----------+
|   allopurinol        | take 1 tablet by     |   30      | 11      | 20 |           |
| (ZYLOPRIM) 100 mg    | mouth once daily     | tablet    |         | 18       |           |
| tablet               |                      |           |         |          |           |
+----------------------+----------------------+-----------+---------+----------+-----------+
|   aspirin 81 mg EC   | Take 81 mg by mouth  |           | 0       |          |           |
| tablet               | Daily.               |           |         |          |           |
+----------------------+----------------------+-----------+---------+----------+-----------+
|   cholecalciferol    | Take 2,000 Units by  |           | 0       |          |           |
| (VITAMIN D-3) 2000   | mouth Every other    |           |         |          |           |
| UNITS                | day.                 |           |         |          |           |
| TABSIndications:     |                      |           |         |          |           |
| Unspecified          |                      |           |         |          |           |
| hypertensive kidney  |                      |           |         |          |           |
| disease with chronic |                      |           |         |          |           |
|  kidney disease      |                      |           |         |          |           |
| stage I through      |                      |           |         |          |           |
| stage IV, or         |                      |           |         |          |           |
| unspecified(403.90), |                      |           |         |          |           |
|  FSGS (focal         |                      |           |         |          |           |
| segmental            |                      |           |         |          |           |
| glomerulosclerosis), |                      |           |         |          |           |
|  Hyperlipidemia,     |                      |           |         |          |           |
| Complications of     |                      |           |         |          |           |
| transplanted kidney  |                      |           |         |          |           |
+----------------------+----------------------+-----------+---------+----------+-----------+
|   glucose blood VI   | Check blood sugar    |   100     | 12      | 20 |           |
| test strips (ONE     | before each meal and | each      |         | 12       |           |
| TOUCH ULTRA TEST)    |  as directed         |           |         |          |           |
| stripIndications:    |                      |           |         |          |           |
| Unspecified          |                      |           |         |          |           |
| hypertensive kidney  |                      |           |         |          |           |
| disease with chronic |                      |           |         |          |           |
|  kidney disease      |                      |           |         |          |           |
| stage I through      |                      |           |         |          |           |
| stage IV, or         |                      |           |         |          |           |
 
| unspecified(403.90), |                      |           |         |          |           |
|  Complications of    |                      |           |         |          |           |
| transplanted kidney, |                      |           |         |          |           |
|  Diabetes mellitus   |                      |           |         |          |           |
| type II,             |                      |           |         |          |           |
| uncontrolled (HCC),  |                      |           |         |          |           |
| Hyperlipidemia       |                      |           |         |          |           |
+----------------------+----------------------+-----------+---------+----------+-----------+
|   insulin lispro     | inject               |   10 vial | 11      | 11/15/20 |           |
| (HUMALOG) 100        | subcutaneously       |           |         | 17       |           |
| units/mL injection   | BEFORE MEALS         |           |         |          |           |
| (vial)Indications:   | ACCORDING TO SLIDING |           |         |          |           |
| Type 2 diabetes      |  SCALE Max dose 20   |           |         |          |           |
| mellitus with        | units daily          |           |         |          |           |
| complication, with   |                      |           |         |          |           |
| long-term current    |                      |           |         |          |           |
| use of insulin ([facility]) |                      |           |         |          |           |
+----------------------+----------------------+-----------+---------+----------+-----------+
|   lisinopril         | take 1 tablet by     |   90      | 3       | 20 |           |
| (PRINIVIL,ZESTRIL)   | mouth once daily     | tablet    |         | 17       |           |
| 30 MG tablet         |                      |           |         |          |           |
+----------------------+----------------------+-----------+---------+----------+-----------+
|   loperamide         | Take 1 capsule by    |   60      | 5       | 20 |           |
| (ANTI-DIARRHEAL) 2   | mouth 4 times daily  | capsule   |         | 14       |           |
| mg                   | as needed.           |           |         |          |           |
| capsuleIndications:  |                      |           |         |          |           |
| Unspecified          |                      |           |         |          |           |
| hypertensive kidney  |                      |           |         |          |           |
| disease with chronic |                      |           |         |          |           |
|  kidney disease      |                      |           |         |          |           |
| stage I through      |                      |           |         |          |           |
| stage IV, or         |                      |           |         |          |           |
| unspecified(403.90), |                      |           |         |          |           |
|  FSGS (focal         |                      |           |         |          |           |
| segmental            |                      |           |         |          |           |
| glomerulosclerosis), |                      |           |         |          |           |
|  Hypothyroidism,     |                      |           |         |          |           |
| Hyperlipidemia       |                      |           |         |          |           |
+----------------------+----------------------+-----------+---------+----------+-----------+
|   losartan (COZAAR)  | take 1 tablet by     |   90      | 3       | 20 |           |
| 50 mg tablet         | mouth once daily     | tablet    |         | 18       |           |
+----------------------+----------------------+-----------+---------+----------+-----------+
|   Prenatal Vit-Fe    | take 1 tablet by     |   30      | 11      | 20 |           |
| Fumarate-FA (PNV     | mouth once daily.    | tablet    |         | 18       |           |
| PRENATAL PLUS        |                      |           |         |          |           |
| MULTIVITAMIN) 27-1   |                      |           |         |          |           |
| MG TABS              |                      |           |         |          |           |
+----------------------+----------------------+-----------+---------+----------+-----------+
|   Respiratory        | Continue nocturnal   |   1 each  | 0       | 20 |           |
| Therapy Supplies     | O2 at 2 l/m through  |           |         | 18       |           |
| MISCIndications: JACK | CPAP for lifetime.   |           |         |          |           |
|  (obstructive sleep  | Dx: JACK G47.33       |           |         |          |           |
| apnea)               | Please provide new   |           |         |          |           |
|                      | nasal mask for CPAP  |           |         |          |           |
|                      | and any other needed |           |         |          |           |
|                      |  replacement         |           |         |          |           |
|                      | supplies.            |           |         |          |           |
+----------------------+----------------------+-----------+---------+----------+-----------+
|   Respiratory        | Decrease CPAP to     |   1 each  | 0       | 20 |           |
| Therapy Supplies     | 12-18 cmH2O          |           |         | 13       |           |
 
| MISC                 | Diagnosis            |           |         |          |           |
|                      | Code(s)327.23.       |           |         |          |           |
|                      | Please send order to |           |         |          |           |
|                      |  [facility].     |           |         |          |           |
+----------------------+----------------------+-----------+---------+----------+-----------+
|   B-D INS SYRINGE    | use as directed      |   100     | 11      | 20 |  |
| 0.5CC/31GX5/16 31G X | BEFORE MEALS         | each      |         | 17       | 8         |
|  " 0.5 ML MISC   |                      |           |         |          |           |
+----------------------+----------------------+-----------+---------+----------+-----------+
|   fludrocortisone    | take 1 tablet by     |   90      | 4       | 20 |  |
| (FLORINEF) 0.1 mg    | mouth every other    | tablet    |         | 18       | 9         |
| tablet               | day                  |           |         |          |           |
+----------------------+----------------------+-----------+---------+----------+-----------+
|   furosemide (LASIX) | Take 1 tablet by     |   30      | 5       | 20 |  |
|  40 mg               | mouth Daily as       | tablet    |         | 15       | 8         |
| tabletIndications:   | needed.              |           |         |          |           |
| Unspecified          |                      |           |         |          |           |
| hypertensive kidney  |                      |           |         |          |           |
| disease with chronic |                      |           |         |          |           |
|  kidney disease      |                      |           |         |          |           |
| stage I through      |                      |           |         |          |           |
| stage IV, or         |                      |           |         |          |           |
| unspecified(403.90), |                      |           |         |          |           |
|  FSGS (focal         |                      |           |         |          |           |
| segmental            |                      |           |         |          |           |
| glomerulosclerosis), |                      |           |         |          |           |
|  Hyperlipidemia      |                      |           |         |          |           |
+----------------------+----------------------+-----------+---------+----------+-----------+
|   LANTUS 100 UNIT/ML | inject 20 units      |   10 vial | 11      | 10/27/20 |  |
|  injection (vial)    | subcutaneously every |           |         | 17       | 8         |
|                      |  morning             |           |         |          |           |
+----------------------+----------------------+-----------+---------+----------+-----------+
|   levothyroxine      | take 1 tablet by     |   30      | 11      | 20 |  |
| (SYNTHROID) 50 mcg   | mouth once daily     | tablet    |         | 17       | 8         |
| tablet               |                      |           |         |          |           |
+----------------------+----------------------+-----------+---------+----------+-----------+
|   magnesium oxide    | take 1 tablet by     |   60      | 11      | 20 | 10/02/201 |
| (MAG-OX) 400 mg      | mouth twice a day    | tablet    |         | 17       | 8         |
| tablet               |                      |           |         |          |           |
+----------------------+----------------------+-----------+---------+----------+-----------+
|   metoprolol         | take 1 tablet by     |   60      | 11      | 20 |  |
| tartrate (LOPRESSOR) | mouth twice a day    | tablet    |         | 18       | 9         |
|  25 mg tablet        |                      |           |         |          |           |
+----------------------+----------------------+-----------+---------+----------+-----------+
|   mycophenolate      | Take 250 mg by mouth |           | 0       |          |  |
| (CELLCEPT) 250 mg    |  2 times daily.      |           |         |          | 8         |
| capsule              |                      |           |         |          |           |
+----------------------+----------------------+-----------+---------+----------+-----------+
|   omeprazole         | Take 20 mg by mouth  |           | 0       |          |  |
| (PRILOSEC) 20 mg     | every morning        |           |         |          | 9         |
| capsule              | (before breakfast).  |           |         |          |           |
+----------------------+----------------------+-----------+---------+----------+-----------+
|   predniSONE         | Take 5 mg by mouth   |           | 0       |          | 10/02/201 |
| (DELTASONE) 5 mg     | Daily.               |           |         |          | 8         |
| tablet               |                      |           |         |          |           |
+----------------------+----------------------+-----------+---------+----------+-----------+
|   QVAR REDIHALER 80  |                      |           | 0       | 20 |  |
| MCG/ACT inhaler      |                      |           |         | 18       | 9         |
+----------------------+----------------------+-----------+---------+----------+-----------+
|   rosuvastatin       | take 1 tablet by     |   30      | 11      | 20 |  |
 
| (CRESTOR) 20 mg      | mouth NIGHTLY        | tablet    |         | 17       | 8         |
| tablet               |                      |           |         |          |           |
+----------------------+----------------------+-----------+---------+----------+-----------+
|   SENSIPAR 30 MG     | take 1 tablet by     |   30      | 11      | 20 |  |
| tablet               | mouth once daily     | tablet    |         | 18       | 9         |
+----------------------+----------------------+-----------+---------+----------+-----------+
|   tacrolimus         | Take 1 capsule by    |   30      | 11      | 20 | 11/15/201 |
| (PROGRAF) 0.5 mg     | mouth every morning. | capsule   |         | 18       | 8         |
| capsuleIndications:  |  For a total of 1.5  |           |         |          |           |
| Kidney replaced by   | mg, A.M., and 1 mg,  |           |         |          |           |
| transplant           | PM.                  |           |         |          |           |
+----------------------+----------------------+-----------+---------+----------+-----------+
|   tacrolimus         | Take 1 capsule by    |   60      | 11      | 20 | 11/15/201 |
| (PROGRAF) 1 mg       | mouth 2 times daily. | capsule   |         | 18       | 8         |
| capsuleIndications:  |  For a total of 1.5  |           |         |          |           |
| Kidney replaced by   | mg, A.M., and 1 mg,  |           |         |          |           |
| transplant           | PM.                  |           |         |          |           |
+----------------------+----------------------+-----------+---------+----------+-----------+
 documented as of this encounter
 
 Plan of Treatment
 
 
+--------+-----------+------------+----------------------+-------------------+
| Date   | Type      | Specialty  | Care Team            | Description       |
+--------+-----------+------------+----------------------+-------------------+
| / | Office    | Cardiology |   Tonia Wilson,    |                   |
|    | Visit     |            | ARNP  [address]   |                   |
|        |           |            | St  [place], [place]  |                   |
|        |           |            | [address]  [phone]  |                   |
|        |           |            |  [phone] (Fax)  |                   |
+--------+-----------+------------+----------------------+-------------------+
| / | Hospital  | Radiology  |   Popeye Townsend, |                   |
|    | Encounter |            |  MD  [address] |                   |
|        |           |            |  St.  [place],   |                   |
|        |           |            | [place] [address]             |                   |
|        |           |            | [phone]         |                   |
|        |           |            | [phone] (Fax)   |                   |
+--------+-----------+------------+----------------------+-------------------+
| / | Surgery   | Radiology  |   Popeye Townsend, | CV EP PPM SYSTEM  |
|    |           |            |  MD  [address] | IMPLANT           |
|        |           |            |  St.  [place],   |                   |
|        |           |            | [place] [address]             |                   |
|        |           |            | [phone]         |                   |
|        |           |            | [phone] (Fax)   |                   |
+--------+-----------+------------+----------------------+-------------------+
| 02/10/ | Clinical  | Cardiology |                      |                   |
|    | Support   |            |                      |                   |
+--------+-----------+------------+----------------------+-------------------+
| / | Office    | Cardiology |   Tonia Wilson,    |                   |
|    | Visit     |            | ARNP  [address]   |                   |
|        |           |            | [place]  |                   |
|        |           |            | [address]  [phone]  |                   |
|        |           |            |  [phone] (Fax)  |                   |
+--------+-----------+------------+----------------------+-------------------+
| / | Off-Site  | Nephrology |   Marzena Moser  |                   |
|    | Visit     |            | M, DO  [address]      |                   |
|        |           |            | [address]      |                   |
|        |           |            | [place]      |                   |
|        |           |            | [address]  [phone]  |                   |
 
|        |           |            |  [phone] (Fax)  |                   |
+--------+-----------+------------+----------------------+-------------------+
 documented as of this encounter
 
 Procedures
 
 
+--------------------+--------+-------------+----------------------+----------------------+
| Procedure Name     | Priori | Date/Time   | Associated Diagnosis | Comments             |
|                    | ty     |             |                      |                      |
+--------------------+--------+-------------+----------------------+----------------------+
| VITAMIN D,         | Routin | 2018  |   Coronary artery    |   Results for this   |
| DEFICIENCY SCREEN  | e      |  4:00 PM    | disease involving    | procedure are in the |
| (25-HYDROXY)       |        | PDT         | native coronary      |  results section.    |
|                    |        |             | artery of native     |                      |
|                    |        |             | heart without angina |                      |
|                    |        |             |  pectoris            |                      |
|                    |        |             | Hypertension,        |                      |
|                    |        |             | essential  Mixed     |                      |
|                    |        |             | hyperlipidemia  PVC  |                      |
|                    |        |             | (premature           |                      |
|                    |        |             | ventricular          |                      |
|                    |        |             | contraction)         |                      |
|                    |        |             | Hypothyroidism,      |                      |
|                    |        |             | unspecified type     |                      |
|                    |        |             | Vitamin D deficiency |                      |
+--------------------+--------+-------------+----------------------+----------------------+
| TSH                | Routin | 2018  |   Coronary artery    |   Results for this   |
|                    | e      |  4:00 PM    | disease involving    | procedure are in the |
|                    |        | PDT         | native coronary      |  results section.    |
|                    |        |             | artery of native     |                      |
|                    |        |             | heart without angina |                      |
|                    |        |             |  pectoris            |                      |
|                    |        |             | Hypertension,        |                      |
|                    |        |             | essential  Mixed     |                      |
|                    |        |             | hyperlipidemia  PVC  |                      |
|                    |        |             | (premature           |                      |
|                    |        |             | ventricular          |                      |
|                    |        |             | contraction)         |                      |
|                    |        |             | Hypothyroidism,      |                      |
|                    |        |             | unspecified type     |                      |
|                    |        |             | Vitamin D deficiency |                      |
+--------------------+--------+-------------+----------------------+----------------------+
| T4, FREE           | Routin | 2018  |   PVC (premature     |   Results for this   |
|                    | e      |  4:00 PM    | ventricular          | procedure are in the |
|                    |        | PDT         | contraction)         |  results section.    |
|                    |        |             | Hypothyroidism,      |                      |
|                    |        |             | unspecified type     |                      |
+--------------------+--------+-------------+----------------------+----------------------+
| MAGNESIUM          | Routin | 2018  |   Coronary artery    |   Results for this   |
|                    | e      |  4:00 PM    | disease involving    | procedure are in the |
|                    |        | PDT         | native coronary      |  results section.    |
|                    |        |             | artery of native     |                      |
|                    |        |             | heart without angina |                      |
|                    |        |             |  pectoris            |                      |
|                    |        |             | Hypertension,        |                      |
|                    |        |             | essential  Mixed     |                      |
|                    |        |             | hyperlipidemia  PVC  |                      |
|                    |        |             | (premature           |                      |
|                    |        |             | ventricular          |                      |
 
|                    |        |             | contraction)         |                      |
|                    |        |             | Hypothyroidism,      |                      |
|                    |        |             | unspecified type     |                      |
|                    |        |             | Vitamin D deficiency |                      |
+--------------------+--------+-------------+----------------------+----------------------+
| BASIC METABOLIC    | Routin | 2018  |   Coronary artery    |   Results for this   |
| PANEL              | e      |  4:00 PM    | disease involving    | procedure are in the |
|                    |        | PDT         | native coronary      |  results section.    |
|                    |        |             | artery of native     |                      |
|                    |        |             | heart without angina |                      |
|                    |        |             |  pectoris            |                      |
|                    |        |             | Hypertension,        |                      |
|                    |        |             | essential  Mixed     |                      |
|                    |        |             | hyperlipidemia  PVC  |                      |
|                    |        |             | (premature           |                      |
|                    |        |             | ventricular          |                      |
|                    |        |             | contraction)         |                      |
|                    |        |             | Hypothyroidism,      |                      |
|                    |        |             | unspecified type     |                      |
|                    |        |             | Vitamin D deficiency |                      |
+--------------------+--------+-------------+----------------------+----------------------+
| ECHO COMPLETE      | Routin | 2018  |   Coronary artery    |   Results for this   |
|                    | e      | 12:58 PM    | disease involving    | procedure are in the |
|                    |        | PDT         | native coronary      |  results section.    |
|                    |        |             | artery of native     |                      |
|                    |        |             | heart without angina |                      |
|                    |        |             |  pectoris            |                      |
|                    |        |             | Hypertension,        |                      |
|                    |        |             | essential  Mixed     |                      |
|                    |        |             | hyperlipidemia       |                      |
|                    |        |             | Aortic valve         |                      |
|                    |        |             | regurgitation        |                      |
|                    |        |             | Pulmonary            |                      |
|                    |        |             | hypertension (HCC)   |                      |
+--------------------+--------+-------------+----------------------+----------------------+
 documented in this encounter
 
 Results
 T4, Free (2018  4:00 PM PDT)
 
+-----------+-------+-----------------+-------------+--------------+
| Component | Value | Ref Range       | Performed   | Pathologist  |
|           |       |                 | At          | Signature    |
+-----------+-------+-----------------+-------------+--------------+
| FT4       | 0.9   | 0.6 - 1.1 ng/dL | [facility]  |              |
|           |       |                 | [facility]    |              |
|           |       |                 | [facility]     |              |
|           |       |                 | [facility] -    |              |
|           |       |                 | LABORATORY  |              |
+-----------+-------+-----------------+-------------+--------------+
 
 
 
+----------+
| Specimen |
+----------+
| Blood    |
+----------+
 
 
 
 
+----------------------+--------------------+--------------------+----------------+
| Performing           | Address            | City/State/Zipcode | Phone Number   |
| Organization         |                    |                    |                |
+----------------------+--------------------+--------------------+----------------+
|   [facility]     |   [address] | [place]    |   [phone] |
| [facility]  |                    | [address]              |                |
| - LABORATORY         |                    |                    |                |
+----------------------+--------------------+--------------------+----------------+
 Vitamin D, Deficiency Screen (25-Hydroxy) (2018  4:00 PM PDT)
 
+-------------+-------+---------------+-------------+--------------+
| Component   | Value | Ref Range     | Performed   | Pathologist  |
|             |       |               | At          | Signature    |
+-------------+-------+---------------+-------------+--------------+
| Vitamin D,  | 37    | 30 - 80 ng/mL | [facility]  |              |
| 25 Hydroxy  |       |               | [facility]    |              |
|             |       |               | [facility]     |              |
|             |       |               | CENTER -    |              |
|             |       |               | LABORATORY  |              |
+-------------+-------+---------------+-------------+--------------+
 
 
 
+----------+
| Specimen |
+----------+
| Blood    |
+----------+
 
 
 
+----------------------+--------------------+--------------------+----------------+
| Performing           | Address            | City/State/Zipcode | Phone Number   |
| Organization         |                    |                    |                |
+----------------------+--------------------+--------------------+----------------+
|   [facility]     |   [address] | [place]    |   [phone] |
| [facility]  |                    | [address]              |                |
| - LABORATORY         |                    |                    |                |
+----------------------+--------------------+--------------------+----------------+
 TSH (2018  4:00 PM PDT)
 
+-----------+-------------------------+--------------+-------------+--------------+
| Component | Value                   | Ref Range    | Performed   | Pathologist  |
|           |                         |              | At          | Signature    |
+-----------+-------------------------+--------------+-------------+--------------+
| TSH       | 1.26Comment: This is a  | 0.45 - 5.33  | [facility]  |              |
|           | third generation TSH    | uIU/mL       | [facility]    |              |
|           | test.                   |              | MEDICAL     |              |
|           |                         |              | CENTER -    |              |
|           |                         |              | LABORATORY  |              |
+-----------+-------------------------+--------------+-------------+--------------+
 
 
 
+----------+
| Specimen |
+----------+
| Blood    |
+----------+
 
 
 
 
+----------------------+--------------------+--------------------+----------------+
| Performing           | Address            | City/State/Zipcode | Phone Number   |
| Organization         |                    |                    |                |
+----------------------+--------------------+--------------------+----------------+
|   [facility]     |   [address] | [place]    |   [phone] |
| [facility]  |                    | [address]              |                |
| - LABORATORY         |                    |                    |                |
+----------------------+--------------------+--------------------+----------------+
 Magnesium (2018  4:00 PM PDT)
 
+-----------+---------+-----------------+-------------+--------------+
| Component | Value   | Ref Range       | Performed   | Pathologist  |
|           |         |                 | At          | Signature    |
+-----------+---------+-----------------+-------------+--------------+
| Magnesium | 1.7 (L) | 1.8 - 2.5 mg/dL | [facility]  |              |
|           |         |                 | [facility]    |              |
|           |         |                 | MEDICAL     |              |
|           |         |                 | CENTER -    |              |
|           |         |                 | LABORATORY  |              |
+-----------+---------+-----------------+-------------+--------------+
 
 
 
+----------+
| Specimen |
+----------+
| Blood    |
+----------+
 
 
 
+----------------------+--------------------+--------------------+----------------+
| Performing           | Address            | City/State/Zipcode | Phone Number   |
| Organization         |                    |                    |                |
+----------------------+--------------------+--------------------+----------------+
|   [facility]     |   [address] | [place]    |   [phone] |
| [facility]  |                    | [address]              |                |
| - LABORATORY         |                    |                    |                |
+----------------------+--------------------+--------------------+----------------+
 Basic Metabolic Panel (2018  4:00 PM PDT)
 
+-------------+--------------------------+-----------------+-------------+--------------+
| Component   | Value                    | Ref Range       | Performed   | Pathologist  |
|             |                          |                 | At          | Signature    |
+-------------+--------------------------+-----------------+-------------+--------------+
| Na          | 140                      | 136 - 149       | [facility]  |              |
|             |                          | mmol/L          | [facility]    |              |
|             |                          |                 | MEDICAL     |              |
|             |                          |                 | CENTER -    |              |
|             |                          |                 | LABORATORY  |              |
+-------------+--------------------------+-----------------+-------------+--------------+
| K           | 5.3 (H)                  | 3.5 - 5.1       | [facility]  |              |
|             |                          | mmol/L          | [facility]    |              |
|             |                          |                 | MEDICAL     |              |
|             |                          |                 | CENTER -    |              |
|             |                          |                 | LABORATORY  |              |
+-------------+--------------------------+-----------------+-------------+--------------+
 
| Cl          | 110 (H)                  | 98 - 109 mmol/L | [facility]  |              |
|             |                          |                 | [facility]    |              |
|             |                          |                 | [facility]     |              |
|             |                          |                 | [facility] -    |              |
|             |                          |                 | LABORATORY  |              |
+-------------+--------------------------+-----------------+-------------+--------------+
| CO2         | 21 (L)                   | 24 - 31 mmol/L  | [facility]  |              |
|             |                          |                 | [facility]    |              |
|             |                          |                 | [facility]     |              |
|             |                          |                 | [facility] -    |              |
|             |                          |                 | LABORATORY  |              |
+-------------+--------------------------+-----------------+-------------+--------------+
| Anion Gap   | 9                        | 3 - 16 mmol/L   | [facility]  |              |
|             |                          |                 | [facility]    |              |
|             |                          |                 | [facility]     |              |
|             |                          |                 | [facility] -    |              |
|             |                          |                 | LABORATORY  |              |
+-------------+--------------------------+-----------------+-------------+--------------+
| Glucose     | 161 (H)                  | 70 - 109 mg/dL  | [facility]  |              |
|             |                          |                 | [facility]    |              |
|             |                          |                 | [facility]     |              |
|             |                          |                 | [facility] -    |              |
|             |                          |                 | LABORATORY  |              |
+-------------+--------------------------+-----------------+-------------+--------------+
| BUN         | 43 (H)                   | 7 - 18 mg/dL    | [facility]  |              |
|             |                          |                 | [facility]    |              |
|             |                          |                 | [facility]     |              |
|             |                          |                 | [facility] -    |              |
|             |                          |                 | LABORATORY  |              |
+-------------+--------------------------+-----------------+-------------+--------------+
| Creatinine  | 1.48 (H)                 | 0.60 - 1.30     | [facility]  |              |
|             |                          | mg/dL           | [facility]    |              |
|             |                          |                 | [facility]     |              |
|             |                          |                 | CENTER -    |              |
|             |                          |                 | LABORATORY  |              |
+-------------+--------------------------+-----------------+-------------+--------------+
| eGFR if not | 35 (L)Comment:           | >=60            | [facility]  |              |
|      | GLOMERULAR FILTRATION    | mL/min/1.73m2   | [facility]    |              |
| [employer]    | RATE,ESTIMATED           |                 | MEDICAL     |              |
|             |   mL/min/1.96p8Bvrq than |                 | CENTER -    |              |
|             |  60    Chronic kidney    |                 | LABORATORY  |              |
|             | disease,if found over a  |                 |             |              |
|             | 3-month period.Less than |                 |             |              |
|             |  15    Kidney failureFor |                 |             |              |
|             |                   |                 |             |              |
|             | Americans,multiply the   |                 |             |              |
|             | calculated GFR by 1.21.  |                 |             |              |
|             |                          |                 |             |              |
+-------------+--------------------------+-----------------+-------------+--------------+
| Calcium     | 9.8                      | 8.3 - 10.5      | [facility]  |              |
|             |                          | mg/dL           | [facility]    |              |
|             |                          |                 | [facility]     |              |
|             |                          |                 | CENTER -    |              |
|             |                          |                 | LABORATORY  |              |
+-------------+--------------------------+-----------------+-------------+--------------+
| BUN/Creatin | 29.1                     |                 | [facility]  |              |
| ine Ratio   |                          |                 | [facility]    |              |
|             |                          |                 | [facility]     |              |
|             |                          |                 | CENTER -    |              |
|             |                          |                 | LABORATORY  |              |
 
+-------------+--------------------------+-----------------+-------------+--------------+
 
 
 
+----------+
| Specimen |
+----------+
| Blood    |
+----------+
 
 
 
+----------------------+--------------------+--------------------+----------------+
| Performing           | Address            | City/State/Zipcode | Phone Number   |
| Organization         |                    |                    |                |
+----------------------+--------------------+--------------------+----------------+
|   [facility]     |   [address] | [place]    |   [phone] |
| [facility]  |                    | [id number]              |                |
| - LABORATORY         |                    |                    |                |
+----------------------+--------------------+--------------------+----------------+
 ECHO Complete (2018 12:58 PM PDT)
 
+-------------+--------+-----------+-------------+--------------+
| Component   | Value  | Ref Range | Performed   | Pathologist  |
|             |        |           | At          | Signature    |
+-------------+--------+-----------+-------------+--------------+
| LVEF-TTE    | 60     | %         | [facility] |              |
| TRANSTHORAC |        |           |             |              |
| IC ECHO     |        |           |             |              |
+-------------+--------+-----------+-------------+--------------+
| Patient     | 270lb  |           | [facility] |              |
| Weight      |        |           |             |              |
| (lbs)       |        |           |             |              |
+-------------+--------+-----------+-------------+--------------+
| Patient     | 5f6in  |           | [facility] |              |
| Height      |        |           |             |              |
+-------------+--------+-----------+-------------+--------------+
| LVIDd       | 5.01   | cm        | [facility] |              |
+-------------+--------+-----------+-------------+--------------+
| FS          | 34     | %         | [facility] |              |
+-------------+--------+-----------+-------------+--------------+
| LA volume   | 106.4  | mL        | [facility] |              |
+-------------+--------+-----------+-------------+--------------+
| MV Area by  | 3.27   | cm2       | [facility] |              |
| P 1/2       |        |           |             |              |
| method      |        |           |             |              |
+-------------+--------+-----------+-------------+--------------+
| IVRT        | 131.49 | msec      | [facility] |              |
+-------------+--------+-----------+-------------+--------------+
| LVOT        | 2.38   | cm        | [facility] |              |
| diameter    |        |           |             |              |
+-------------+--------+-----------+-------------+--------------+
| LVOT peak   | 68.43  | cm/s      | [facility] |              |
| travon         |        |           |             |              |
+-------------+--------+-----------+-------------+--------------+
| AV peak travon | 149.09 | cm/s      | [facility] |              |
+-------------+--------+-----------+-------------+--------------+
| AV peak     | 8.89   | mmHg      | [facility] |              |
| gradient    |        |           |             |              |
+-------------+--------+-----------+-------------+--------------+
 
| MV peak     | 2.62   | mmHg      | [facility] |              |
| gradient    |        |           |             |              |
+-------------+--------+-----------+-------------+--------------+
| MV Pressure | 67.22  | msec      | [facility] |              |
|  1/2 time   |        |           |             |              |
+-------------+--------+-----------+-------------+--------------+
| LA Volume   | 47     | mL/m2     | [facility] |              |
| Index       |        |           |             |              |
+-------------+--------+-----------+-------------+--------------+
| AV LVOT     | 1.87   | mmHg      | [facility] |              |
| Peak        |        |           |             |              |
| Gradient    |        |           |             |              |
+-------------+--------+-----------+-------------+--------------+
| LV          | 7.01   | cm        | [facility] |              |
| Diastolic   |        |           |             |              |
| Length 4C   |        |           |             |              |
+-------------+--------+-----------+-------------+--------------+
| LV          | 60     | %         | [facility] |              |
| Moran's   |        |           |             |              |
| Biplane EF  |        |           |             |              |
+-------------+--------+-----------+-------------+--------------+
| LV ED       | 86.24  | ml        | [facility] |              |
| Volume      |        |           |             |              |
| (Moran's) |        |           |             |              |
+-------------+--------+-----------+-------------+--------------+
| LV ED       | 38     | ml/m2     | [facility] |              |
| Volume      |        |           |             |              |
| Index       |        |           |             |              |
+-------------+--------+-----------+-------------+--------------+
| LV ES       | 37.52  | ml        | [facility] |              |
| Volume      |        |           |             |              |
+-------------+--------+-----------+-------------+--------------+
| MV E'       | 5      | cm/s      | [facility] |              |
| Septal      |        |           |             |              |
| Velocity    |        |           |             |              |
+-------------+--------+-----------+-------------+--------------+
| MV          | 349.23 | cm/s2     | [facility] |              |
| Deceleratio |        |           |             |              |
| n [place]     |        |           |             |              |
+-------------+--------+-----------+-------------+--------------+
| MV          | 231.78 | msec      | [facility] |              |
| Deceleratio |        |           |             |              |
| n Time      |        |           |             |              |
+-------------+--------+-----------+-------------+--------------+
| MV E/A      | 1.24   |           | [facility] |              |
| Ratio       |        |           |             |              |
+-------------+--------+-----------+-------------+--------------+
| MV Peak     | 65.42  | cm/s      | [facility] |              |
| A-Wave      |        |           |             |              |
+-------------+--------+-----------+-------------+--------------+
| MV Peak     | 80.95  | cm/s      | [facility] |              |
| E-Wave      |        |           |             |              |
+-------------+--------+-----------+-------------+--------------+
| LA/Aorta    | 1.04   |           | [facility] |              |
| Ratio       |        |           |             |              |
+-------------+--------+-----------+-------------+--------------+
| MV E/E      | 16.19  |           | [facility] |              |
| SEPTAL      |        |           |             |              |
+-------------+--------+-----------+-------------+--------------+
| LV ES       | 17     | ml/m2     | [facility] |              |
 
| Volume      |        |           |             |              |
| Index       |        |           |             |              |
+-------------+--------+-----------+-------------+--------------+
| Heart Rate  | 62     |           | [facility] |              |
+-------------+--------+-----------+-------------+--------------+
| Aortic Root | 3.9    | cm        | [facility] |              |
|  Diameter   |        |           |             |              |
+-------------+--------+-----------+-------------+--------------+
| IVS         | 1.26   | cm        | [facility] |              |
| Diastolic   |        |           |             |              |
| Thickness   |        |           |             |              |
| MM          |        |           |             |              |
+-------------+--------+-----------+-------------+--------------+
| LVPW        | 1.34   | cm        | [facility] |              |
| Diastolic   |        |           |             |              |
| Thickness   |        |           |             |              |
| MM          |        |           |             |              |
+-------------+--------+-----------+-------------+--------------+
| LV Systolic | 3.31   | cm        | [facility] |              |
|  Diameter   |        |           |             |              |
| MM          |        |           |             |              |
+-------------+--------+-----------+-------------+--------------+
| AV Cusp     | 1.13   | cm        | [facility] |              |
| Seperation  |        |           |             |              |
| MM          |        |           |             |              |
+-------------+--------+-----------+-------------+--------------+
| LA Systolic | 4.06   | cm        | [facility] |              |
|  Diameter   |        |           |             |              |
| MM          |        |           |             |              |
+-------------+--------+-----------+-------------+--------------+
| TAPSE       | 1.8    | cm        | [facility] |              |
+-------------+--------+-----------+-------------+--------------+
| RA PRESSURE | 3      | mmHg      | [facility] |              |
+-------------+--------+-----------+-------------+--------------+
 
 
 
+----------+
| Specimen |
+----------+
|          |
+----------+
 
 
 
+---------------------------------------------------------------------------+---------------
+
| Narrative                                                                 | Performed At  
|
+---------------------------------------------------------------------------+---------------
+
|   This result has an attachment that is not available.  1.   Moderate     |   [facility] 
|
| left atrial dilatation.2.   Normal left ventricular size with a mild      |               
|
| concentric left ventricular hypertrophy.   Left ventricular systolic      |               
|
| dysfunction is preserved.   LVEF is 60-65%.3.   Mild aortic root          |               
|
| dilatation measuring 3.9 cm in diameter.4.   Mildly thickened and         |               
 
|
| calcified trileaflet aortic valve with adequate opening.   There is       |               
|
| aortic valve sclerosis without significant aortic valve stenosis.5.       |               
|
|   Mildly thickened and calcified mitral valve with a mild mitral valve    |               
|
|  regurgitation.6.   Mild mitral annular calcification.   7.   Normal      |               
|
| right-sided pressure.8.   Normal IVC with normal respiratory              |               
|
| collapse.9.   When compared to echocardiography on 4/15/14, no            |               
|
| significant changes.                                                      |               
|
|8.   Normal IVC with normal respiratory collapse.                          |               
|
|9.   When compared to echocardiography on 4/15/14, no significant changes. |               
|
+---------------------------------------------------------------------------+---------------
+
 
 
 
+---------------+---------+--------------------+--------------+
| Performing    | Address | City/State/Zipcode | Phone Number |
| Organization  |         |                    |              |
+---------------+---------+--------------------+--------------+
|   [facility] |         |                    |              |
+---------------+---------+--------------------+--------------+
 documented in this encounter
 
 Visit Diagnoses
 
 
+-------------------------------------------------------------------------------------+
| Diagnosis                                                                           |
+-------------------------------------------------------------------------------------+
|   Coronary artery disease involving native coronary artery of native heart without  |
| angina pectoris                                                                     |
+-------------------------------------------------------------------------------------+
|   Hypertension, essential  Unspecified essential hypertension                       |
+-------------------------------------------------------------------------------------+
|   Mixed hyperlipidemia                                                              |
+-------------------------------------------------------------------------------------+
|   Aortic valve regurgitation  Aortic valve disorders                                |
+-------------------------------------------------------------------------------------+
|   Pulmonary hypertension (HCC)  Other chronic pulmonary heart diseases              |
+-------------------------------------------------------------------------------------+
|   PVC (premature ventricular contraction)  Other premature beats                    |
+-------------------------------------------------------------------------------------+
|   Hypothyroidism, unspecified type                                                  |
+-------------------------------------------------------------------------------------+
|   Vitamin D deficiency  Unspecified vitamin D deficiency                            |
+-------------------------------------------------------------------------------------+
 documented in this encounter

## 2020-01-08 NOTE — XMS
Encounter Summary
  Created on: 2020
 
 Viviana Ricci
 External Reference #: [id number]
 : 47
 Sex: Female
 
 Demographics
 
 
+-----------------------+----------------------+
| Address               | [address]      |
|                       | [place]  [address] |
+-----------------------+----------------------+
| Home Phone            | [phone]      |
+-----------------------+----------------------+
| Preferred Language    | Unknown              |
+-----------------------+----------------------+
| Marital Status        | Single               |
+-----------------------+----------------------+
| Denominational Affiliation | [id number]                 |
+-----------------------+----------------------+
| Race                  | Unknown              |
+-----------------------+----------------------+
| Ethnic Group          | Unknown              |
+-----------------------+----------------------+
 
 
 Author
 
 
+--------------+--------------------------------------------+
| Author       | [facility]  |
|              | and [place]                                |
+--------------+--------------------------------------------+
| Organization | [facility]  |
|              | and [place]                                |
+--------------+--------------------------------------------+
| Address      | Unknown                                    |
+--------------+--------------------------------------------+
| Phone        | Unavailable                                |
+--------------+--------------------------------------------+
 
 
 
 Support
 
 
+----------------+--------------+---------------------+-----------------+
| Name           | Relationship | Address             | Phone           |
+----------------+--------------+---------------------+-----------------+
| Ada/Ed Radhames | ECON         | [address]              | [phone] |
|                |              | [place]  |                 |
|                |              |  [address]              |                 |
+----------------+--------------+---------------------+-----------------+
 
 
 
 
 Care Team Providers
 
 
+-----------------------+------+-------------+
| Care Team Member Name | Role | Phone       |
+-----------------------+------+-------------+
 PCP  | Unavailable |
+-----------------------+------+-------------+
 
 
 
 Encounter Details
 
 
+--------+-------------+---------------------+----------------------+-------------+
| Date   | Type        | Department          | Care Team            | Description |
+--------+-------------+---------------------+----------------------+-------------+
| / | Orders Only |   [facility]  [place]         |   Marzena Moser  |             |
|    |             | NEPHROLOGY  [address]   | M, DO  [address]      |             |
|        |             | [address]   | [address]      |             |
|        |             | [place]     | [place]      |             |
|        |             | [address]          | [address]  [phone]  |             |
|        |             | [phone]        |  [phone] (Fax)  |             |
+--------+-------------+---------------------+----------------------+-------------+
 
 
 
 Social History
 
 
+--------------+-------+-----------+--------+------+
| Tobacco Use  | Types | Packs/Day | Years  | Date |
|              |       |           | Used   |      |
+--------------+-------+-----------+--------+------+
| Never Smoker |       |           |        |      |
+--------------+-------+-----------+--------+------+
 
 
 
+---------------------+---+---+---+
| Smokeless Tobacco:  |   |   |   |
| Never Used          |   |   |   |
+---------------------+---+---+---+
 
 
 
+---------------------------------------------------------------+
| Comments: some second hand smoke exposure, but fairly minimal |
+---------------------------------------------------------------+
 
 
 
+-------------+-------------+---------+----------+
| Alcohol Use | Drinks/Week | oz/Week | Comments |
+-------------+-------------+---------+----------+
| No          |             |         |          |
+-------------+-------------+---------+----------+
 
 
 
 
+------------------+---------------+
| Sex Assigned at  | Date Recorded |
| Birth            |               |
+------------------+---------------+
| Not on file      |               |
+------------------+---------------+
 
 
 
+----------------+-------------+-------------+
| Job Start Date | Occupation  | Industry    |
+----------------+-------------+-------------+
| Not on file    | Not on file | Not on file |
+----------------+-------------+-------------+
 
 
 
+----------------+--------------+------------+
| Travel History | Travel Start | Travel End |
+----------------+--------------+------------+
 
 
 
+-------------------------------------+
| No recent travel history available. |
+-------------------------------------+
 documented as of this encounter
 
 Progress Notes
 Marzena Moser DO - 2013  8:55 AM PDTNEPHROLOGY
 
 I called [facility].  Apparently, Viviana's UA did not meet threshold for reflex culture.  I ca
lled the pt and she is still having increased symptoms.  [facility] will send the same urine 
for culture.  She is a fairly accurate historian, therefore, will start Cephalexin 500 mg, B
ID, x3 days, pending the culture results.  E-Rx'd to [facility]  [place].
 
 CC:  [facility].Electronically signed by Marzena Moser DO at 2013  
8:59 AM PDTdocumented in this encounter
 
 Plan of Treatment
 
 
+--------+-----------+------------+----------------------+-------------------+
| Date   | Type      | Specialty  | Care Team            | Description       |
+--------+-----------+------------+----------------------+-------------------+
| / | Office    | Cardiology |   Tonia Wilson,    |                   |
|    | Visit     |            | ARNP  [address]   |                   |
|        |           |            | St [place], [place]  |                   |
|        |           |            | [address]  [phone]  |                   |
|        |           |            |  [phone] (Fax)  |                   |
+--------+-----------+------------+----------------------+-------------------+
| / | Hospital  | Radiology  |   Popeye Townsend, |                   |
|    | Encounter |            |  MD  [address] |                   |
|        |           |            |  [place],   |                   |
|        |           |            | [place] [address]             |                   |
|        |           |            | [phone]         |                   |
|        |           |            | [phone] (Fax)   |                   |
+--------+-----------+------------+----------------------+-------------------+
| / | Surgery   | Radiology  |   Popeye Townsend, | CV EP PPM SYSTEM  |
 
|    |           |            |  MD  [address] | IMPLANT           |
|        |           |            |  [place],   |                   |
|        |           |            | [place] [address]             |                   |
|        |           |            | [phone]         |                   |
|        |           |            | [phone] (Fax)   |                   |
+--------+-----------+------------+----------------------+-------------------+
| 02/10/ | Clinical  | Cardiology |                      |                   |
|    | Support   |            |                      |                   |
+--------+-----------+------------+----------------------+-------------------+
| / | Office    | Cardiology |   RakeshTonia andre,    |                   |
|    | Visit     |            | ARNP  [address]   |                   |
|        |           |            | [place]  |                   |
|        |           |            | [address] [phone]  |                   |
|        |           |            |  [phone] (Fax)  |                   |
+--------+-----------+------------+----------------------+-------------------+
| / | Off-Site  | Nephrology |   Marzena Moser  |                   |
|    | Visit     |            | DO ETIENNE  [address] [place] [address]      |                   |
|        |           |            | [address]      |                   |
|        |           |            | [place]      |                   |
|        |           |            | [address] [phone]  |                   |
|        |           |            |  [phone] (Fax)  |                   |
+--------+-----------+------------+----------------------+-------------------+
 documented as of this encounter
 
 Visit Diagnoses
 Not on filedocumented in this encounter

## 2020-01-08 NOTE — XMS
Encounter Summary
  Created on: 2020
 
 Viviana Ricci
 External Reference #: [id number]
 : 47
 Sex: Female
 
 Demographics
 
 
+-----------------------+----------------------+
| Address               | [address]      |
|                       | [place]  [address] |
+-----------------------+----------------------+
| Home Phone            | [phone]      |
+-----------------------+----------------------+
| Preferred Language    | Unknown              |
+-----------------------+----------------------+
| Marital Status        | Single               |
+-----------------------+----------------------+
| Gnosticism Affiliation | [id number]                 |
+-----------------------+----------------------+
| Race                  | Unknown              |
+-----------------------+----------------------+
| Ethnic Group          | Unknown              |
+-----------------------+----------------------+
 
 
 Author
 
 
+--------------+--------------------------------------------+
| Author       | [facility]  |
|              | and [place]                                |
+--------------+--------------------------------------------+
| Organization | [facility]  |
|              | and [place]                                |
+--------------+--------------------------------------------+
| Address      | Unknown                                    |
+--------------+--------------------------------------------+
| Phone        | Unavailable                                |
+--------------+--------------------------------------------+
 
 
 
 Support
 
 
+----------------+--------------+---------------------+-----------------+
| Name           | Relationship | Address             | Phone           |
+----------------+--------------+---------------------+-----------------+
| Ada/Ed Radhames | ECON         | [address]              | [phone] |
|                |              | [place]  |                 |
|                |              |  [address]              |                 |
+----------------+--------------+---------------------+-----------------+
 
 
 
 
 Care Team Providers
 
 
+------------------------+------+-----------------+
| Care Team Member Name  | Role | Phone           |
+------------------------+------+-----------------+
| Marzena Moser DO | PCP  | [phone] |
+------------------------+------+-----------------+
 
 
 
 Encounter Details
 
 
+--------+----------+---------------------+----------------------+-------------+
| Date   | Type     | Department          | Care Team            | Description |
+--------+----------+---------------------+----------------------+-------------+
| / | Abstract |   [facility] [place]         |   Marzena Moser  |             |
|    |          | NEPHROLOGY  [address]   | DO ETIENNE  [address]      |             |
|        |          | [address]   | [address]      |             |
|        |          | [place], [place]     | [place], [place]      |             |
|        |          | [address]          | [address]  [phone]  |             |
|        |          | [phone]        |  [phone] (Fax)  |             |
+--------+----------+---------------------+----------------------+-------------+
 
 
 
 Social History
 
 
+--------------+-------+-----------+--------+------+
| Tobacco Use  | Types | Packs/Day | Years  | Date |
|              |       |           | Used   |      |
+--------------+-------+-----------+--------+------+
| Never Smoker |       |           |        |      |
+--------------+-------+-----------+--------+------+
 
 
 
+---------------------+---+---+---+
| Smokeless Tobacco:  |   |   |   |
| Never Used          |   |   |   |
+---------------------+---+---+---+
 
 
 
+---------------------------------------------------------------+
| Comments: some second hand smoke exposure, but fairly minimal |
+---------------------------------------------------------------+
 
 
 
+-------------+-------------+---------+----------+
| Alcohol Use | Drinks/Week | oz/Week | Comments |
+-------------+-------------+---------+----------+
| No          |             |         |          |
+-------------+-------------+---------+----------+
 
 
 
 
+------------------+---------------+
| Sex Assigned at  | Date Recorded |
| Birth            |               |
+------------------+---------------+
| Not on file      |               |
+------------------+---------------+
 
 
 
+----------------+-------------+-------------+
| Job Start Date | Occupation  | Industry    |
+----------------+-------------+-------------+
| Not on file    | Not on file | Not on file |
+----------------+-------------+-------------+
 
 
 
+----------------+--------------+------------+
| Travel History | Travel Start | Travel End |
+----------------+--------------+------------+
 
 
 
+-------------------------------------+
| No recent travel history available. |
+-------------------------------------+
 documented as of this encounter
 
 Plan of Treatment
 
 
+--------+-----------+------------+----------------------+-------------------+
| Date   | Type      | Specialty  | Care Team            | Description       |
+--------+-----------+------------+----------------------+-------------------+
| / | Office    | Cardiology |   Tonia Wilson,    |                   |
|    | Visit     |            | ARNP  [address]   |                   |
|        |           |            | St  [place] [facility] [place]  |                   |
|        |           |            | [address]  [phone]  |                   |
|        |           |            |  [phone] (Fax)  |                   |
+--------+-----------+------------+----------------------+-------------------+
| / | Hospital  | Radiology  |   Popeye Townsend, |                   |
|    | Encounter |            |  MD  [address] |                   |
|        |           |            |  St.  [place],   |                   |
|        |           |            | [place] [address]             |                   |
|        |           |            | [phone]         |                   |
|        |           |            | [phone] (Fax)   |                   |
+--------+-----------+------------+----------------------+-------------------+
| / | Surgery   | Radiology  |   Popeye Townsend, | CV EP PPM SYSTEM  |
|    |           |            |  MD  [address] | IMPLANT           |
|        |           |            |  St.  [place],   |                   |
|        |           |            | [place] [address]             |                   |
|        |           |            | [phone]         |                   |
|        |           |            | [phone] (Fax)   |                   |
+--------+-----------+------------+----------------------+-------------------+
| 02/10/ | Clinical  | Cardiology |                      |                   |
2020   | Support   |            |                      |                   |
+--------+-----------+------------+----------------------+-------------------+
| / | Office    | Cardiology |   Tonia Wilson,    |                   |
|    | Visit     |            | [facility]  [address]   |                   |
 
|        |           |            |   [place]  |                   |
|        |           |            | [address]  [phone]  |                   |
|        |           |            |  [phone] (Fax)  |                   |
+--------+-----------+------------+----------------------+-------------------+
| / | Off-Site  | Nephrology |   Marzena Moser  |                   |
2020   | Visit     |            | DO ETIENNE  [address]      |                   |
|        |           |            | [address]      |                   |
|        |           |            | [place]      |                   |
|        |           |            | [address]  [phone]  |                   |
|        |           |            |  [phone] (Fax)  |                   |
+--------+-----------+------------+----------------------+-------------------+
 documented as of this encounter
 
 Procedures
 
 
+--------------------+--------+------------+----------------------+----------------------+
| Procedure Name     | Priori | Date/Time  | Associated Diagnosis | Comments             |
|                    | ty     |            |                      |                      |
+--------------------+--------+------------+----------------------+----------------------+
| EXTERNAL LAB:      | Routin | 2018 |                      |   Results for this   |
| TACROLIMUS LEVEL,  | e      |            |                      | procedure are in the |
| [facility]               |        |            |                      |  results section.    |
+--------------------+--------+------------+----------------------+----------------------+
 documented in this encounter
 
 Results
 External Lab: Tacrolimus Level, [facility] (2018)
 
+-------------+-------+-----------+------------+--------------+
| Component   | Value | Ref Range | Performed  | Pathologist  |
|             |       |           | At         | Signature    |
+-------------+-------+-----------+------------+--------------+
| Tacrolimus, | 3.8   |           |            |              |
|  LC-MS/MS,  |       |           |            |              |
| External    |       |           |            |              |
+-------------+-------+-----------+------------+--------------+
 
 
 
+----------+
| Specimen |
+----------+
|          |
+----------+
 documented in this encounter
 
 Visit Diagnoses
 Not on filedocumented in this encounter

## 2020-01-08 NOTE — XMS
Encounter Summary
  Created on: 2020
 
 Viviana Ricci
 External Reference #: [id number]
 : 47
 Sex: Female
 
 Demographics
 
 
+-----------------------+----------------------+
| Address               | [address]      |
|                       | [place]  [address] |
+-----------------------+----------------------+
| Home Phone            | [phone]      |
+-----------------------+----------------------+
| Preferred Language    | Unknown              |
+-----------------------+----------------------+
| Marital Status        | Single               |
+-----------------------+----------------------+
| Rastafarian Affiliation | [id number]                 |
+-----------------------+----------------------+
| Race                  | Unknown              |
+-----------------------+----------------------+
| Ethnic Group          | Unknown              |
+-----------------------+----------------------+
 
 
 Author
 
 
+--------------+--------------------------------------------+
| Author       | [facility]  |
|              | and [facility]                                |
+--------------+--------------------------------------------+
| Organization | [facility]  |
|              | and [place]                                |
+--------------+--------------------------------------------+
| Address      | Unknown                                    |
+--------------+--------------------------------------------+
| Phone        | Unavailable                                |
+--------------+--------------------------------------------+
 
 
 
 Support
 
 
+----------------+--------------+---------------------+-----------------+
| Name           | Relationship | Address             | Phone           |
+----------------+--------------+---------------------+-----------------+
| Ada/Ed Radhames | ECON         | [address]              | [phone] |
|                |              | [place]  |                 |
|                |              |  [address]              |                 |
+----------------+--------------+---------------------+-----------------+
 
 
 
 
 Care Team Providers
 
 
+------------------------+------+-----------------+
| Care Team Member Name  | Role | Phone           |
+------------------------+------+-----------------+
| Marzena Moser DO | PCP  | [phone] |
+------------------------+------+-----------------+
 
 
 
 Reason for Visit
 
 
+-------------------+----------+
| Reason            | Comments |
+-------------------+----------+
| Medication Refill |          |
+-------------------+----------+
 
 
 
 Encounter Details
 
 
+--------+--------+---------------------+----------------------+-------------------+
| Date   | Type   | Department          | Care Team            | Description       |
+--------+--------+---------------------+----------------------+-------------------+
| / | Refill |   [facility]         |   Marzena Moser  | Medication Refill |
|    |        | NEPHROLOGY  [address]   | M, DO  [address]      |                   |
|        |        | [address]   | [address]      |                   |
|        |        | [place], [place]     | [place], [place]      |                   |
|        |        | [address]          | [address]  [phone]  |                   |
|        |        | [phone]        |  [phone] (Fax)  |                   |
+--------+--------+---------------------+----------------------+-------------------+
 
 
 
 Social History
 
 
+--------------+-------+-----------+--------+------+
| Tobacco Use  | Types | Packs/Day | Years  | Date |
|              |       |           | Used   |      |
+--------------+-------+-----------+--------+------+
| Never Smoker |       |           |        |      |
+--------------+-------+-----------+--------+------+
 
 
 
+---------------------+---+---+---+
| Smokeless Tobacco:  |   |   |   |
| Never Used          |   |   |   |
+---------------------+---+---+---+
 
 
 
+---------------------------------------------------------------+
| Comments: some second hand smoke exposure, but fairly minimal |
 
+---------------------------------------------------------------+
 
 
 
+-------------+-------------+---------+----------+
| Alcohol Use | Drinks/Week | oz/Week | Comments |
+-------------+-------------+---------+----------+
| No          |             |         |          |
+-------------+-------------+---------+----------+
 
 
 
+------------------+---------------+
| Sex Assigned at  | Date Recorded |
| Birth            |               |
+------------------+---------------+
| Not on file      |               |
+------------------+---------------+
 
 
 
+----------------+-------------+-------------+
| Job Start Date | Occupation  | Industry    |
+----------------+-------------+-------------+
| Not on file    | Not on file | Not on file |
+----------------+-------------+-------------+
 
 
 
+----------------+--------------+------------+
| Travel History | Travel Start | Travel End |
+----------------+--------------+------------+
 
 
 
+-------------------------------------+
| No recent travel history available. |
+-------------------------------------+
 documented as of this encounter
 
 Plan of Treatment
 
 
+--------+-----------+------------+----------------------+-------------------+
| Date   | Type      | Specialty  | Care Team            | Description       |
+--------+-----------+------------+----------------------+-------------------+
| / | Office    | Cardiology |   Tonia Wilson,    |                   |
|    | Visit     |            | ARNP  [address]   |                   |
|        |           |            | St [place], [place]  |                   |
|        |           |            | [address]  [phone]  |                   |
|        |           |            |  [phone] (Fax)  |                   |
+--------+-----------+------------+----------------------+-------------------+
| / | Hospital  | Radiology  |   Popeye Townsend, |                   |
|    | Encounter |            |  MD  [address] |                   |
|        |           |            |  [place],   |                   |
|        |           |            | [place] [address]             |                   |
|        |           |            | [phone]         |                   |
|        |           |            | [phone] (Fax)   |                   |
+--------+-----------+------------+----------------------+-------------------+
| / | Surgery   | Radiology  |   Popeye Townsend, | CV EP PPM SYSTEM  |
 
|    |           |            |  MD  [address] | IMPLANT           |
|        |           |            |  [place],   |                   |
|        |           |            | [place] [address]             |                   |
|        |           |            | [phone]         |                   |
|        |           |            | [phone] (Fax)   |                   |
+--------+-----------+------------+----------------------+-------------------+
| 02/10/ | Clinical  | Cardiology |                      |                   |
|    | Support   |            |                      |                   |
+--------+-----------+------------+----------------------+-------------------+
| / | Office    | Cardiology |   Tonia Wilson,    |                   |
|    | Visit     |            | ARNP  [address]   |                   |
|        |           |            | [place]  |                   |
|        |           |            | [id number] [phone]  |                   |
|        |           |            |  [phone] (Fax)  |                   |
+--------+-----------+------------+----------------------+-------------------+
| / | Off-Site  | Nephrology |   Marzena Moser  |                   |
|    | Visit     |            | DO [place]  [address] [place] [address]      |                   |
|        |           |            | [address]      |                   |
|        |           |            | [place]      |                   |
|        |           |            | [id number] [phone]  |                   |
|        |           |            |  [phone] (Fax)  |                   |
+--------+-----------+------------+----------------------+-------------------+
 documented as of this encounter
 
 Visit Diagnoses
 
 
+-------------------------------------------+
| Diagnosis                                 |
+-------------------------------------------+
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
+-------------------------------------------+
 documented in this encounter

## 2020-01-08 NOTE — XMS
Encounter Summary
  Created on: 2020
 
 Viviana Ricci
 External Reference #: [id number]
 : 47
 Sex: Female
 
 Demographics
 
 
+-----------------------+----------------------+
| Address               | [address]      |
|                       | [place]  [address] |
+-----------------------+----------------------+
| Home Phone            | [phone]      |
+-----------------------+----------------------+
| Preferred Language    | Unknown              |
+-----------------------+----------------------+
| Marital Status        | Single               |
+-----------------------+----------------------+
| Sabianism Affiliation | [id number]                 |
+-----------------------+----------------------+
| Race                  | Unknown              |
+-----------------------+----------------------+
| Ethnic Group          | Unknown              |
+-----------------------+----------------------+
 
 
 Author
 
 
+--------------+--------------------------------------------+
| Author       | [facility]  |
|              | and [place]                                |
+--------------+--------------------------------------------+
| Organization | [facility]  |
|              | and [place]                                |
+--------------+--------------------------------------------+
| Address      | Unknown                                    |
+--------------+--------------------------------------------+
| Phone        | Unavailable                                |
+--------------+--------------------------------------------+
 
 
 
 Support
 
 
+----------------+--------------+---------------------+-----------------+
| Name           | Relationship | Address             | Phone           |
+----------------+--------------+---------------------+-----------------+
| Ada/Ed Radhames | ECON         | [address]              | [phone] |
|                |              | [place]  |                 |
|                |              |  [address]              |                 |
+----------------+--------------+---------------------+-----------------+
 
 
 
 
 Care Team Providers
 
 
+-----------------------+------+-------------+
| Care Team Member Name | Role | Phone       |
+-----------------------+------+-------------+
 PCP  | Unavailable |
+-----------------------+------+-------------+
 
 
 
 Reason for Visit
 
 
+--------+-----------------------------+
| Reason | Comments                    |
+--------+-----------------------------+
| Other  | medication dose adjustment  |
+--------+-----------------------------+
 
 
 
 Encounter Details
 
 
+--------+-----------+----------------------+----------------------+--------------------+
| Date   | Type      | Department           | Care Team            | Description        |
+--------+-----------+----------------------+----------------------+--------------------+
| / | Telephone |   [facility]  |   Marzena Moser  | Other (medication  |
|    |           | Care  [address]  | M, DO  [address]      | dose adjustment )  |
|        |           | [address]  [place],   | [address]      |                    |
|        |           | [place] [address]        | [place]      |                    |
|        |           | [phone]         | [address] [phone]  |                    |
|        |           |                      |  [phone] (Fax)  |                    |
+--------+-----------+----------------------+----------------------+--------------------+
 
 
 
 Social History
 
 
+--------------+-------+-----------+--------+------+
| Tobacco Use  | Types | Packs/Day | Years  | Date |
|              |       |           | Used   |      |
+--------------+-------+-----------+--------+------+
| Never Smoker |       |           |        |      |
+--------------+-------+-----------+--------+------+
 
 
 
+---------------------+---+---+---+
| Smokeless Tobacco:  |   |   |   |
| Never Used          |   |   |   |
+---------------------+---+---+---+
 
 
 
+-------------+-------------+---------+----------+
| Alcohol Use | Drinks/Week | oz/Week | Comments |
 
+-------------+-------------+---------+----------+
| No          |             |         |          |
+-------------+-------------+---------+----------+
 
 
 
+------------------+---------------+
| Sex Assigned at  | Date Recorded |
| Birth            |               |
+------------------+---------------+
| Not on file      |               |
+------------------+---------------+
 
 
 
+----------------+-------------+-------------+
| Job Start Date | Occupation  | Industry    |
+----------------+-------------+-------------+
| Not on file    | Not on file | Not on file |
+----------------+-------------+-------------+
 
 
 
+----------------+--------------+------------+
| Travel History | Travel Start | Travel End |
+----------------+--------------+------------+
 
 
 
+-------------------------------------+
| No recent travel history available. |
+-------------------------------------+
 documented as of this encounter
 
 Plan of Treatment
 
 
+--------+-----------+------------+----------------------+-------------------+
| Date   | Type      | Specialty  | Care Team            | Description       |
+--------+-----------+------------+----------------------+-------------------+
| / | Office    | Cardiology |   Tonia Wilson,    |                   |
|    | Visit     |            | ARNP  [address]   |                   |
|        |           |            | [place]  |                   |
|        |           |            | [address]  [phone]  |                   |
|        |           |            |  [phone] (Fax)  |                   |
+--------+-----------+------------+----------------------+-------------------+
| / | Hospital  | Radiology  |   Popeye Townsend, |                   |
2020   | Encounter |            |  MD  [address] |                   |
|        |           |            |  St.  [place],   |                   |
|        |           |            | [place] [address]             |                   |
|        |           |            | [phone]         |                   |
|        |           |            | [phone] (Fax)   |                   |
+--------+-----------+------------+----------------------+-------------------+
| / | Surgery   | Radiology  |   Popeye Townsend, | CV EP PPM SYSTEM  |
|    |           |            |  MD  [address] | IMPLANT           |
|        |           |            |  St.  [place],   |                   |
|        |           |            | [place] [address]             |                   |
|        |           |            | [phone]         |                   |
|        |           |            | [phone] (Fax)   |                   |
+--------+-----------+------------+----------------------+-------------------+
 
| 02/10/ | Clinical  | Cardiology |                      |                   |
2020   | Support   |            |                      |                   |
+--------+-----------+------------+----------------------+-------------------+
| / | Office    | Cardiology |   Tonia Wilson,    |                   |
|    | Visit     |            | BEL[place]IS  [address]   |                   |
|        |           |            | St  [place]  |                   |
|        |           |            | [address]  [phone]  |                   |
|        |           |            |  [phone] (Fax)  |                   |
+--------+-----------+------------+----------------------+-------------------+
| / | Off-Site  | Nephrology |   Marzena Moser  |                   |
|    | Visit     |            | DO [place]  [address]      |                   |
|        |           |            | [address]      |                   |
|        |           |            | [place]      |                   |
|        |           |            | [address]  [phone]  |                   |
|        |           |            |  [phone] (Fax)  |                   |
+--------+-----------+------------+----------------------+-------------------+
 documented as of this encounter
 
 Visit Diagnoses
 Not on filedocumented in this encounter

## 2020-01-08 NOTE — XMS
Encounter Summary
  Created on: 2020
 
 Viviana Ricci
 External Reference #: [id number]
 : 47
 Sex: Female
 
 Demographics
 
 
+-----------------------+----------------------+
| Address               | [address]      |
|                       | [place]  [address] |
+-----------------------+----------------------+
| Home Phone            | [phone]      |
+-----------------------+----------------------+
| Preferred Language    | Unknown              |
+-----------------------+----------------------+
| Marital Status        | Single               |
+-----------------------+----------------------+
| Quaker Affiliation | [id number]                 |
+-----------------------+----------------------+
| Race                  | Unknown              |
+-----------------------+----------------------+
| Ethnic Group          | Unknown              |
+-----------------------+----------------------+
 
 
 Author
 
 
+--------------+--------------------------------------------+
| Author       | [facility]  |
|              | and [facility]                                |
+--------------+--------------------------------------------+
| Organization | [facility]  |
|              | and [facility]                                |
+--------------+--------------------------------------------+
| Address      | Unknown                                    |
+--------------+--------------------------------------------+
| Phone        | Unavailable                                |
+--------------+--------------------------------------------+
 
 
 
 Support
 
 
+----------------+--------------+---------------------+-----------------+
| Name           | Relationship | Address             | Phone           |
+----------------+--------------+---------------------+-----------------+
| Ada/Ed Radhames | ECON         | [address]              | [phone] |
|                |              | [place]  |                 |
|                |              |  [address]              |                 |
+----------------+--------------+---------------------+-----------------+
 
 
 
 
 Care Team Providers
 
 
+-----------------------+------+-------------+
| Care Team Member Name | Role | Phone       |
+-----------------------+------+-------------+
 PCP  | Unavailable |
+-----------------------+------+-------------+
 
 
 
 Reason for Visit
 
 
+--------+----------+
| Reason | Comments |
+--------+----------+
| Pain   |          |
+--------+----------+
 
 
 
 Encounter Details
 
 
+--------+-----------+---------------------+----------------------+-------------+
| Date   | Type      | Department          | Care Team            | Description |
+--------+-----------+---------------------+----------------------+-------------+
| / | Telephone |   [facility]         |   Marzena Moser  | Pain        |
|    |           | NEPHROLOGY  [address]   | M, DO  [address]      |             |
|        |           | [address]   | [address]      |             |
|        |           | [place], [place]     | [place], [place]      |             |
|        |           | [address]          | [address]  [phone]  |             |
|        |           | [phone]        |  [phone] (Fax)  |             |
+--------+-----------+---------------------+----------------------+-------------+
 
 
 
 Social History
 
 
+--------------+-------+-----------+--------+------+
| Tobacco Use  | Types | Packs/Day | Years  | Date |
|              |       |           | Used   |      |
+--------------+-------+-----------+--------+------+
| Never Smoker |       |           |        |      |
+--------------+-------+-----------+--------+------+
 
 
 
+---------------------+---+---+---+
| Smokeless Tobacco:  |   |   |   |
| Never Used          |   |   |   |
+---------------------+---+---+---+
 
 
 
+---------------------------------------------------------------+
| Comments: some second hand smoke exposure, but fairly minimal |
 
+---------------------------------------------------------------+
 
 
 
+-------------+-------------+---------+----------+
| Alcohol Use | Drinks/Week | oz/Week | Comments |
+-------------+-------------+---------+----------+
| No          |             |         |          |
+-------------+-------------+---------+----------+
 
 
 
+------------------+---------------+
| Sex Assigned at  | Date Recorded |
| Birth            |               |
+------------------+---------------+
| Not on file      |               |
+------------------+---------------+
 
 
 
+----------------+-------------+-------------+
| Job Start Date | Occupation  | Industry    |
+----------------+-------------+-------------+
| Not on file    | Not on file | Not on file |
+----------------+-------------+-------------+
 
 
 
+----------------+--------------+------------+
| Travel History | Travel Start | Travel End |
+----------------+--------------+------------+
 
 
 
+-------------------------------------+
| No recent travel history available. |
+-------------------------------------+
 documented as of this encounter
 
 Plan of Treatment
 
 
+--------+-----------+------------+----------------------+-------------------+
| Date   | Type      | Specialty  | Care Team            | Description       |
+--------+-----------+------------+----------------------+-------------------+
| / | Office    | Cardiology |   Tonia Wilson,    |                   |
|    | Visit     |            | [facility]  [address]   |                   |
|        |           |            | St  [place]  |                   |
|        |           |            | [address]  [phone]  |                   |
|        |           |            |  [phone] (Fax)  |                   |
+--------+-----------+------------+----------------------+-------------------+
| / | Hospital  | Radiology  |   Popeye Townsend, |                   |
|    | Encounter |            |  MD  [address] |                   |
|        |           |            |  St.  [place],   |                   |
|        |           |            | [place] [address]             |                   |
|        |           |            | [phone]         |                   |
|        |           |            | [phone] (Fax)   |                   |
+--------+-----------+------------+----------------------+-------------------+
| / | Surgery   | Radiology  |   Popeye Townsend, | CV EP PPM SYSTEM  |
 
|    |           |            |  MD  [address] | IMPLANT           |
|        |           |            |  St.  [place],   |                   |
|        |           |            | [place] [address]             |                   |
|        |           |            | [phone]         |                   |
|        |           |            | [phone] (Fax)   |                   |
+--------+-----------+------------+----------------------+-------------------+
| 02/10/ | Clinical  | Cardiology |                      |                   |
|    | Support   |            |                      |                   |
+--------+-----------+------------+----------------------+-------------------+
| / | Office    | Cardiology |   Tonia Wilson,    |                   |
|    | Visit     |            | [facility]  [address]   |                   |
|        |           |            | [place]  |                   |
|        |           |            | [address]  [phone]  |                   |
|        |           |            |  [phone] (Fax)  |                   |
+--------+-----------+------------+----------------------+-------------------+
| / | Off-Site  | Nephrology |   Marzena Moser  |                   |
|    | Visit     |            | DO [place]TIENNE  [address] [place] [address]      |                   |
|        |           |            | [address]      |                   |
|        |           |            | [place]      |                   |
|        |           |            | [address]  [phone]  |                   |
|        |           |            |  [phone] (Fax)  |                   |
+--------+-----------+------------+----------------------+-------------------+
 documented as of this encounter
 
 Visit Diagnoses
 Not on filedocumented in this encounter

## 2020-01-08 NOTE — XMS
Encounter Summary
  Created on: 2020
 
 Viviana Ricci
 External Reference #: [id number]
 : 47
 Sex: Female
 
 Demographics
 
 
+-----------------------+----------------------+
| Address               | [address]      |
|                       | [place]  [address] |
+-----------------------+----------------------+
| Home Phone            | [phone]      |
+-----------------------+----------------------+
| Preferred Language    | Unknown              |
+-----------------------+----------------------+
| Marital Status        | Single               |
+-----------------------+----------------------+
| Baptism Affiliation | [id number]                 |
+-----------------------+----------------------+
| Race                  | Unknown              |
+-----------------------+----------------------+
| Ethnic Group          | Unknown              |
+-----------------------+----------------------+
 
 
 Author
 
 
+--------------+--------------------------------------------+
| Author       | [facility]  |
|              | and [facility]                                |
+--------------+--------------------------------------------+
| Organization | [facility]  |
|              | and [facility]                                |
+--------------+--------------------------------------------+
| Address      | Unknown                                    |
+--------------+--------------------------------------------+
| Phone        | Unavailable                                |
+--------------+--------------------------------------------+
 
 
 
 Support
 
 
+----------------+--------------+---------------------+-----------------+
| Name           | Relationship | Address             | Phone           |
+----------------+--------------+---------------------+-----------------+
| Ada/Ed Radhames | ECON         | [address]              | [phone] |
|                |              | [place]  |                 |
|                |              |  [address]              |                 |
+----------------+--------------+---------------------+-----------------+
 
 
 
 
 Care Team Providers
 
 
+-----------------------+------+-------------+
| Care Team Member Name | Role | Phone       |
+-----------------------+------+-------------+
 PCP  | Unavailable |
+-----------------------+------+-------------+
 
 
 
 Reason for Visit
 Evaluate & Treat (Routine)
 
+--------+--------+------------+--------------+--------------+---------------+
| Status | Reason | Specialty  | Diagnoses /  | Referred By  | Referred To   |
|        |        |            | Procedures   | Contact      | Contact       |
+--------+--------+------------+--------------+--------------+---------------+
| Closed |        | Nephrology |   Diagnoses  |   Stroemel,  |   Stroemel,   |
|        |        |            |  Unspecified | Marzena CLIFTON,   | Marzena CLIFTON DO |
|        |        |            |              | DO  [address] |   [address]    |
|        |        |            | complication |  [place] [address] [place]   |
|        |        |            |  of kidney   |  [address]  [place]  | [address]  [place]    |
|        |        |            | transplant   | [place]    | [place]     |
|        |        |            | FSGS (focal  | [address]        | [address]  Phone: |
|        |        |            | segmental    | Phone:       |  [phone] |
|        |        |            | glomeruloscl | [phone] |   Fax:        |
|        |        |            | erosis)      |   Fax:       | [phone]  |
|        |        |            | Hypertension | [phone] |               |
|        |        |            | , essential  |              |               |
|        |        |            |  Procedures  |              |               |
|        |        |            |  PA OFFICE   |              |               |
|        |        |            | OUTPATIENT   |              |               |
|        |        |            | VISIT 25     |              |               |
|        |        |            | MINUTES      |              |               |
+--------+--------+------------+--------------+--------------+---------------+
 
 
 
 
 Encounter Details
 
 
+--------+-----------+---------------------+----------------------+----------------------+
| Date   | Type      | Department          | Care Team            | Description          |
+--------+-----------+---------------------+----------------------+----------------------+
| / | Off-Site  |   [facility]         |   Marzena Moser  | Kidney replaced by   |
|    | Visit     | NEPHROLOGY  [address]   | M, DO  [address]      | transplant (Primary  |
|        |           | [address]   | [address]      | Dx); Essential       |
|        |           | [place], [place]     | [place]      | hypertension with    |
|        |           | [address]          | [address]  [phone]  | goal blood pressure  |
|        |           | [phone]        |  [phone] (Fax)  | less than 130/80;    |
|        |           |                     |                      | Other specified      |
|        |           |                     |                      | hypothyroidism; Type |
|        |           |                     |                      |  2 DM with CKD stage |
|        |           |                     |                      |  2 and hypertension  |
|        |           |                     |                      | ([facility])                |
+--------+-----------+---------------------+----------------------+----------------------+
 
 
 
 
 Social History
 
 
+--------------+-------+-----------+--------+------+
| Tobacco Use  | Types | Packs/Day | Years  | Date |
|              |       |           | Used   |      |
+--------------+-------+-----------+--------+------+
| Never Smoker |       |           |        |      |
+--------------+-------+-----------+--------+------+
 
 
 
+---------------------+---+---+---+
| Smokeless Tobacco:  |   |   |   |
| Never Used          |   |   |   |
+---------------------+---+---+---+
 
 
 
+---------------------------------------------------------------+
| Comments: some second hand smoke exposure, but fairly minimal |
+---------------------------------------------------------------+
 
 
 
+-------------+-------------+---------+----------+
| Alcohol Use | Drinks/Week | oz/Week | Comments |
+-------------+-------------+---------+----------+
| No          |             |         |          |
+-------------+-------------+---------+----------+
 
 
 
+------------------+---------------+
| Sex Assigned at  | Date Recorded |
| Birth            |               |
+------------------+---------------+
| Not on file      |               |
+------------------+---------------+
 
 
 
+----------------+-------------+-------------+
| Job Start Date | Occupation  | Industry    |
+----------------+-------------+-------------+
| Not on file    | Not on file | Not on file |
+----------------+-------------+-------------+
 
 
 
+----------------+--------------+------------+
| Travel History | Travel Start | Travel End |
+----------------+--------------+------------+
 
 
 
+-------------------------------------+
| No recent travel history available. |
 
+-------------------------------------+
 documented as of this encounter
 
 Last Filed Vital Signs
 
 
+-------------------+----------------------+----------------------+----------+
| Vital Sign        | Reading              | Time Taken           | Comments |
+-------------------+----------------------+----------------------+----------+
| Blood Pressure    | 149/75               | 2016  1:33 PM  |          |
|                   |                      | PDT                  |          |
+-------------------+----------------------+----------------------+----------+
| Pulse             | -                    | -                    |          |
+-------------------+----------------------+----------------------+----------+
| Temperature       | 36   C (96.8   F)    | 2016  1:33 PM  |          |
|                   |                      | PDT                  |          |
+-------------------+----------------------+----------------------+----------+
| Respiratory Rate  | -                    | -                    |          |
+-------------------+----------------------+----------------------+----------+
| Oxygen Saturation | -                    | -                    |          |
+-------------------+----------------------+----------------------+----------+
| Inhaled Oxygen    | -                    | -                    |          |
| Concentration     |                      |                      |          |
+-------------------+----------------------+----------------------+----------+
| Weight            | 126.3 kg (278 lb 7.1 | 2016  1:33 PM  |          |
|                   |  oz)                 | PDT                  |          |
+-------------------+----------------------+----------------------+----------+
| Height            | -                    | -                    |          |
+-------------------+----------------------+----------------------+----------+
| Body Mass Index   | 44.94                | 2015  3:02 PM  |          |
|                   |                      | PST                  |          |
+-------------------+----------------------+----------------------+----------+
 documented in this encounter
 
 Progress Notes
 Marzena Moser DO - 2016  1:34 PM PDTFormatting of this note might be different
 from the original.
 Subjective:  NEPHROLOGY 
  
 Patient ID: Viviana Ricci is a 70 y.o. female.
 
 HPI Comments: 
 Followup for this 69 YO  white female s/p renal allograft, 05, with remote  allograft 
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
demia, SHPTH,  previous low back pain, obesity/JACK, chronic pulmonary  hypertension, histori
izabela related to centripetal obesity, and  CAD, s/p CABG x3 vessels,. 
 
 She is still living independently.  She denies fever, chills, cough, or chest pain.  She in
termittently has pain in her knees from DJD, but appears to manage. She has not had BMD yet 
or a bone densitometry.   She states that her Vascular Surgeon  took her off of apixaban aft
er 1 year. She states that she has not had any problems since then.
 
 MEDS:
 
 Prograf 1.5 mg, BID
 Mycophenolate 250 mg, BID.
 Prednisone 5 mg, daily.
 Outpatient Prescriptions Marked as Taking for the 16 encounter (Off-Site Visit) with Maye Moser, DO 
 Medication Sig Dispense Refill 
 
   allopurinol (ZYLOPRIM) 100 mg tablet Take 1 tablet by mouth Daily. 30 tablet 11 
   [DISCONTINUED] apixaban (ELIQUIS) 2.5 mg tablet Take 1 tablet by mouth 2 times daily. 6
0 tablet 11 
   cholecalciferol (VITAMIN D-3) 2000 UNITS TABS Take 5,000 Units by mouth Every other day
.   
   cinacalcet (SENSIPAR) 30 mg tablet Take 1 tablet by mouth Daily. 30 tablet 12 
   fludrocortisone (FLORINEF) 0.1 mg tablet Take 1 tablet by mouth Every other day. 45 tab
let 3 
   furosemide (LASIX) 40 mg tablet Take 1 tablet by mouth Daily as needed. 30 tablet 5 
   glucose blood VI test strips (ONE TOUCH ULTRA TEST) strip Check blood sugar before each
 meal and as directed 100 each 12 
   insulin glargine (LANTUS) 100 units/mL injection (vial) Inject 20 Units under the skin 
every morning. 10 mL 11 
   insulin lispro (HUMALOG) 100 units/mL injection (vial) Inject subcutaneously before sara
ls according to sliding scale 10 vial 11 
   Insulin Syringe-Needle U-100 (BD INSULIN SYRINGE ULTRAFINE) 31G X 16" 0.5 ML MISC Use
 before meals and as directed. 100 each 11 
   levothyroxine (LEVOTHROID) 50 mcg tablet Take 1 tablet by mouth Daily. 30 tablet 11 
   lisinopril (PRINIVIL,ZESTRIL) 30 MG tablet Take 1 tablet by mouth Daily. 90 tablet 3 
   loperamide (ANTI-DIARRHEAL) 2 mg capsule Take 1 capsule by mouth 4 times daily as neede
d. 60 capsule 5 
   losartan (COZAAR) 50 mg tablet Take 1 tablet by mouth Daily. 90 tablet 3 
   [DISCONTINUED] losartan (COZAAR) 50 mg tablet Take 1 tablet by mouth Daily. (Patient roxanna cox differently: Take 50 mg by mouth 2 times daily.) 90 tablet 3 
   magnesium oxide (MAG-OX) 400 mg tablet Take 1 tablet by mouth 2 times daily. 62 tablet 
11 
   metoprolol tartrate (LOPRESSOR) 25 mg tablet Take 1 tablet by mouth 2 times daily. 60 t
ablet 11 
   omeprazole (PRILOSEC) 20 mg capsule Take one capsule by mouth once daily on an empty st
omach 90 capsule 4 
   Prenatal Multivit-Min-Fe-FA (PRENATAL VITAMINS) 0.8 MG TABS Take 0.8 mg by mouth Daily.
 30 each 11 
   Respiratory Therapy Supplies MISC Decrease CPAP to 12-18 cmH2O Diagnosis Code(s)327.23.
 Please send order to [facility]. 1 each 0 
   rosuvastatin (CRESTOR) 20 mg tablet Take 1 tablet by mouth nightly. 30 tablet 11 
 
 No Known Allergies
 
 Objective:   Blood pressure 149/75, temperature 36 C (96.8 F), weight 126.3 kg (278 lb 
7.1 oz).
  
  
 
 Physical Exam
 
 HEENT:  no thrush.
 Heart:  Regular rate and rhythm with no S3, S4, murmur or rub.
 Lungs:  CTA bilaterally.  No rales or wheezes.
 Abdomen:  soft, obese, renal allograft in RLQ is nontender, normoactive bowel sounds.
 Extremities:  trace edema, no clubbing, cyanosis,  no foot ulcers.
 
 Lab Results 
 Component Value Date 
  NAEX 138 2016 
  KEX 4.6 2016 
  CLEX 106 2016 
  CO2EX 18* 2016 
  BUNEX 30* 2016 
  CREEX 1.2 2016 
  EGFREX 45 2016 
 
  GLUEX 140* 2016 
  PHOSEX 2.5 2016 
  MGEX 1.6* 2016 
  PTHEX 193.0* 2016 
  OJP6EBV 8 2016 
 
 Lab Results 
 Component Value Date 
  CHOLEX 161 2016 
  HDLEX 45.4 2016 
  LDLEX 82 2016 
  TRIGEX 167 2016 
 
 Lab Results 
 Component Value Date 
  WBCEX 4.0* 2016 
  HGBEX 13.9 2016 
  HCTEX 42 2016 
  PLTEX 153 2016 
 
 Lab Results 
 Component Value Date 
  TACROLIMUSEX 8.4 2016 
 
 Lab Results 
 Component Value Date 
  UAEX Negative 2016 
  UAEX Normal 2016 
  UAEX Negative 2016 
  UAEX 5 2016 
  UAEX Negative 2016 
  UAEX 0 2016 
  UAEX 1.012 2016 
  UAEX 25* 2016 
 
 Assessment: 
 
 1.  Renal Allograft, 05 -- Scr is stable.  Her tacro. is minimally, although her dose 
has not changed in > 2 years?
 2.  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
 3.  Type 2 DM, requiring insuline--fair control.
 4.  Hyperlipidemia--on statin Rx
 5.  Hypertension--better control on her home readings.
 6.  SHPTH--stable.  
 7.  Obesity/JACK/Pulmonary hypertension-- compensated.
 8.  CAD, s/p CABG, --stable on ASA, metoprolol, atorvastatin.
 9.  Type IV RTA--stable.
 10. Chronic Low Back pain--in remission.
 11.   DJD, left knee--about same.
 12.  s/p DVT left leg, 2015--no recurrence off of apixaban.
 
 Plan: 
 
 1.  I reviewed with Viviana her  labs, BP, drug levels, and her glycemic control. I did discus
s that it would be ideal, long term, 
 to try to target her Hba1c < 7.0 % over time, to minimize end organ damage.
 2.  Will schedule her for a BMD/bone densitometry as on outpatient.
 3.  Will recheck her tacro. level next week, to verify this as her dose has not changed in 
some time.
 4.  Will plan to see her back in 6 mo. at the CKD Clinic at [facility].   She was ad
 
vised to continue her Standing Order Q 4 months, in the interim.
 
 Electronically signed by: Marzena Moser, 2016 13:34 
 
 CC:    Jose David Dalal M.D., [facility], [facility]
            Edgar Sanders MD, [facility] Orthopedics
            ONOFRE MERAZCNikkieS
 
 Electronically signed by Marzena Moser,  at 2016  2:30 PM PDTdocumented in thi
s encounter
 
 Plan of Treatment
 
 
+--------+-----------+------------+----------------------+-------------------+
| Date   | Type      | Specialty  | Care Team            | Description       |
+--------+-----------+------------+----------------------+-------------------+
| / | Office    | Cardiology |   Tonia Wilson,    |                   |
|    | Visit     |            | ARNP  [address]   |                   |
|        |           |            | [facility] [place]  |                   |
|        |           |            | [id number] [phone]  |                   |
|        |           |            |  [phone] (Fax)  |                   |
+--------+-----------+------------+----------------------+-------------------+
| / | Hospital  | Radiology  |   Popeye Townsend, |                   |
|    | Encounter |            |  MD  [address] |                   |
|        |           |            |  [place]   |                   |
|        |           |            | [place] [address]             |                   |
|        |           |            | [phone]         |                   |
|        |           |            | [phone] (Fax)   |                   |
+--------+-----------+------------+----------------------+-------------------+
| / | Surgery   | Radiology  |   Cary Yinmarissa, | CV EP PPM SYSTEM  |
|    |           |            |  MD  [address] | IMPLANT           |
|        |           |            |  St.  [place]   |                   |
|        |           |            | [place] [address]             |                   |
|        |           |            | [phone]         |                   |
|        |           |            | [phone] (Fax)   |                   |
+--------+-----------+------------+----------------------+-------------------+
| 02/10/ | Clinical  | Cardiology |                      |                   |
|    | Support   |            |                      |                   |
+--------+-----------+------------+----------------------+-------------------+
| / | Office    | Cardiology |   Tonia Wilson,    |                   |
|    | Visit     |            | ARNJESSICA  [address]   |                   |
|        |           |            | St  [place]  |                   |
|        |           |            | [address]  [phone]  |                   |
|        |           |            |  [phone] (Fax)  |                   |
+--------+-----------+------------+----------------------+-------------------+
| / | Off-Site  | Nephrology |   Marzena Moser  |                   |
2020   | Visit     |            | DO ETIENNE  [address]      |                   |
|        |           |            | [address]      |                   |
|        |           |            | [place]      |                   |
|        |           |            | [address]  [phone]  |                   |
|        |           |            |  [phone] (Fax)  |                   |
+--------+-----------+------------+----------------------+-------------------+
 
 
 
+------+------+--------+----------------------+----------------------+
| Name | Type | Priori | Associated Diagnoses | Order Schedule       |
|      |      | ty     |                      |                      |
+------+------+--------+----------------------+----------------------+
 
| TSH  | Lab  | Routin |   Kidney replaced by | Every 12 weeks for 4 |
|      |      | e      |  transplant          |  Occurrences         |
|      |      |        | Essential            | starting 2016  |
|      |      |        | hypertension with    | until 2017     |
|      |      |        | goal blood pressure  |                      |
|      |      |        | less than 130/80     |                      |
|      |      |        | Other specified      |                      |
|      |      |        | hypothyroidism  Type |                      |
|      |      |        |  2 DM with CKD stage |                      |
|      |      |        |  2 and hypertension  |                      |
|      |      |        | ([facility])                |                      |
+------+------+--------+----------------------+----------------------+
 documented as of this encounter
 
 Procedures
 
 
+----------------------+--------+-------------+----------------------+----------------------
+
| Procedure Name       | Priori | Date/Time   | Associated Diagnosis | Comments             
|
|                      | ty     |             |                      |                      
|
+----------------------+--------+-------------+----------------------+----------------------
+
| IMAGING REPORT -     |        | 2016  |                      |   Results for this   
|
| EXTERNAL SCAN        |        | 12:00 AM    |                      | procedure are in the 
|
|                      |        | PST         |                      |  results section.    
|
+----------------------+--------+-------------+----------------------+----------------------
+
| IMAGING REPORT -     |        | 2016  |                      |   Results for this   
|
| EXTERNAL SCAN        |        | 12:00 AM    |                      | procedure are in the 
|
|                      |        | PST         |                      |  results section.    
|
+----------------------+--------+-------------+----------------------+----------------------
+
| LABS - EXTERNAL SCAN |        | 2016  |                      |   Results for this   
|
|                      |        | 12:00 AM    |                      | procedure are in the 
|
|                      |        | PDT         |                      |  results section.    
|
+----------------------+--------+-------------+----------------------+----------------------
+
 documented in this encounter
 
 Results
 IMAGING REPORT - EXTERNAL SCAN (2016 12:00 AM PST)
 
+------------------------------------------------------------------------+--------------+
| Narrative                                                              | Performed At |
+------------------------------------------------------------------------+--------------+
|   This result has an attachment that is not available.  Ordered by an  |              |
| unspecified provider.                                                  |              |
+------------------------------------------------------------------------+--------------+
 
 IMAGING REPORT - EXTERNAL SCAN (2016 12:00 AM PST)
 
+------------------------------------------------------------------------+--------------+
| Narrative                                                              | Performed At |
+------------------------------------------------------------------------+--------------+
|   This result has an attachment that is not available.  Ordered by an  |              |
| unspecified provider.                                                  |              |
+------------------------------------------------------------------------+--------------+
 LABS - EXTERNAL SCAN (2016 12:00 AM PDT)
 
+------------------------------------------------------------------------+--------------+
| Narrative                                                              | Performed At |
+------------------------------------------------------------------------+--------------+
|   This result has an attachment that is not available.  Ordered by an  |              |
| unspecified provider.                                                  |              |
+------------------------------------------------------------------------+--------------+
 documented in this encounter
 
 Visit Diagnoses
 
 
+--------------------------------------------------------------------+
| Diagnosis                                                          |
+--------------------------------------------------------------------+
|   Kidney replaced by transplant - Primary                          |
+--------------------------------------------------------------------+
|   Essential hypertension with goal blood pressure less than 130/80 |
+--------------------------------------------------------------------+
|   Other specified hypothyroidism                                   |
+--------------------------------------------------------------------+
|   Type 2 DM with CKD stage 2 and hypertension (HCC)                |
+--------------------------------------------------------------------+
 [id number] in this encounter

## 2020-01-08 NOTE — XMS
Encounter Summary
  Created on: 2020
 
 Viviana Ricci
 External Reference #: [id number]
 : 47
 Sex: Female
 
 Demographics
 
 
+-----------------------+----------------------+
| Address               | [address]      |
|                       | [place]  [address] |
+-----------------------+----------------------+
| Home Phone            | [phone]      |
+-----------------------+----------------------+
| Preferred Language    | Unknown              |
+-----------------------+----------------------+
| Marital Status        | Single               |
+-----------------------+----------------------+
| Anabaptism Affiliation | [id number]                 |
+-----------------------+----------------------+
| Race                  | Unknown              |
+-----------------------+----------------------+
| Ethnic Group          | Unknown              |
+-----------------------+----------------------+
 
 
 Author
 
 
+--------------+--------------------------------------------+
| Author       | [facility]  |
|              | and [facility]                                |
+--------------+--------------------------------------------+
| Organization | [facility]  |
|              | and [facility]                                |
+--------------+--------------------------------------------+
| Address      | Unknown                                    |
+--------------+--------------------------------------------+
| Phone        | Unavailable                                |
+--------------+--------------------------------------------+
 
 
 
 Support
 
 
+----------------+--------------+---------------------+-----------------+
| Name           | Relationship | Address             | Phone           |
+----------------+--------------+---------------------+-----------------+
| Ada/Ed Radhames | ECON         | [address]              | [phone] |
|                |              | [place]  |                 |
|                |              |  [id number]              |                 |
+----------------+--------------+---------------------+-----------------+
 
 
 
 
 Care Team Providers
 
 
+-----------------------+------+-------------+
| Care Team Member Name | Role | Phone       |
+-----------------------+------+-------------+
 PCP  | Unavailable |
+-----------------------+------+-------------+
 
 
 
 Reason for Visit
 Evaluate & Treat (Routine)
 
+--------+--------+------------+--------------+--------------+---------------+
| Status | Reason | Specialty  | Diagnoses /  | Referred By  | Referred To   |
|        |        |            | Procedures   | Contact      | Contact       |
+--------+--------+------------+--------------+--------------+---------------+
| Closed |        | Nephrology |   Diagnoses  |   Stroemel,  |   Stroemel,   |
|        |        |            |  Unspecified | Marzena CLIFTON,   | Marzena CLIFTON DO |
|        |        |            |              | DO  [address] |   [address]    |
|        |        |            | complication |  [place] [address]   |
|        |        |            |  of kidney   |  [address]  [place]  | [address]  [place]    |
|        |        |            | transplant   | [place]    | [place]     |
|        |        |            | FSGS (focal  | [address]        | [address]  Phone: |
|        |        |            | segmental    | Phone:       |  [phone] |
|        |        |            | glomeruloscl | [phone] |   Fax:        |
|        |        |            | erosis)      |   Fax:       | [phone]  |
|        |        |            | Hypertension | [phone] |               |
|        |        |            | , essential  |              |               |
|        |        |            |  Procedures  |              |               |
|        |        |            |  MN OFFICE   |              |               |
|        |        |            | OUTPATIENT   |              |               |
|        |        |            | VISIT 25     |              |               |
|        |        |            | MINUTES      |              |               |
+--------+--------+------------+--------------+--------------+---------------+
 
 
 
 
 Encounter Details
 
 
+--------+-----------+---------------------+----------------------+----------------------+
| Date   | Type      | Department          | Care Team            | Description          |
+--------+-----------+---------------------+----------------------+----------------------+
| / | Off-Site  |   [facility]         |   Marzena Moser  | Kidney replaced by   |
|    | Visit     | NEPHROLOGY  [address]   | M, DO  [address]      | transplant (Primary  |
|        |           | [address]   | [place]      | Dx); Essential       |
|        |           | [place], [place]     | [place]      | hypertension with    |
|        |           | [address]          | [address]  [phone]  | goal blood pressure  |
|        |           | [phone]        |  [phone] (Fax)  | less than 130/80;    |
|        |           |                     |                      | Other specified      |
|        |           |                     |                      | hypothyroidism; Type |
|        |           |                     |                      |  2 DM with CKD stage |
|        |           |                     |                      |  2 and hypertension  |
|        |           |                     |                      | (Formerly [facility])                |
+--------+-----------+---------------------+----------------------+----------------------+
 
 
 
 
 Social History
 
 
+--------------+-------+-----------+--------+------+
| Tobacco Use  | Types | Packs/Day | Years  | Date |
|              |       |           | Used   |      |
+--------------+-------+-----------+--------+------+
| Never Smoker |       |           |        |      |
+--------------+-------+-----------+--------+------+
 
 
 
+---------------------+---+---+---+
| Smokeless Tobacco:  |   |   |   |
| Never Used          |   |   |   |
+---------------------+---+---+---+
 
 
 
+---------------------------------------------------------------+
| Comments: some second hand smoke exposure, but fairly minimal |
+---------------------------------------------------------------+
 
 
 
+-------------+-------------+---------+----------+
| Alcohol Use | Drinks/Week | oz/Week | Comments |
+-------------+-------------+---------+----------+
| No          |             |         |          |
+-------------+-------------+---------+----------+
 
 
 
+------------------+---------------+
| Sex Assigned at  | Date Recorded |
| Birth            |               |
+------------------+---------------+
| Not on file      |               |
+------------------+---------------+
 
 
 
+----------------+-------------+-------------+
| Job Start Date | Occupation  | Industry    |
+----------------+-------------+-------------+
| Not on file    | Not on file | Not on file |
+----------------+-------------+-------------+
 
 
 
+----------------+--------------+------------+
| Travel History | Travel Start | Travel End |
+----------------+--------------+------------+
 
 
 
+-------------------------------------+
| No recent travel history available. |
 
+-------------------------------------+
 documented as of this encounter
 
 Last Filed Vital Signs
 
 
+-------------------+----------------------+----------------------+----------+
| Vital Sign        | Reading              | Time Taken           | Comments |
+-------------------+----------------------+----------------------+----------+
| Blood Pressure    | 149/75               | 2016  1:33 PM  |          |
|                   |                      | PDT                  |          |
+-------------------+----------------------+----------------------+----------+
| Pulse             | -                    | -                    |          |
+-------------------+----------------------+----------------------+----------+
| Temperature       | 36   C (96.8   F)    | 2016  1:33 PM  |          |
|                   |                      | PDT                  |          |
+-------------------+----------------------+----------------------+----------+
| Respiratory Rate  | -                    | -                    |          |
+-------------------+----------------------+----------------------+----------+
| Oxygen Saturation | -                    | -                    |          |
+-------------------+----------------------+----------------------+----------+
| Inhaled Oxygen    | -                    | -                    |          |
| Concentration     |                      |                      |          |
+-------------------+----------------------+----------------------+----------+
| Weight            | 126.3 kg (278 lb 7.1 | 2016  1:33 PM  |          |
|                   |  oz)                 | PDT                  |          |
+-------------------+----------------------+----------------------+----------+
| Height            | -                    | -                    |          |
+-------------------+----------------------+----------------------+----------+
| Body Mass Index   | 44.94                | 2015  3:02 PM  |          |
|                   |                      | PST                  |          |
+-------------------+----------------------+----------------------+----------+
 documented in this encounter
 
 Progress Notes
 Marzena Moser DO - 2016  1:34 PM PDTFormatting of this note might be different
 from the original.
 Subjective:  NEPHROLOGY 
  
 Patient ID: Viviana Ricci is a 70 y.o. female.
 
 HPI Comments: 
 Followup for this 69 YO  white female s/p renal allograft, 05, with remote  allograft 
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
demia, SHPTH,  previous low back pain, obesity/JACK, chronic pulmonary  hypertension, histori
izabela related to centripetal obesity, and  CAD, s/p CABG x3 vessels,. 
 
 She is still living independently.  She denies fever, chills, cough, or chest pain.  She in
termittently has pain in her knees from DJD, but appears to manage. She has not had BMD yet 
or a bone densitometry.   She states that her Vascular Surgeon  took her off of apixaban aft
er 1 year. She states that she has not had any problems since then.
 
 MEDS:
 
 Prograf 1.5 mg, BID
 Mycophenolate 250 mg, BID.
 Prednisone 5 mg, daily.
 Outpatient Prescriptions Marked as Taking for the 16 encounter (Off-Site Visit) with Maye Moser, DO 
 Medication Sig Dispense Refill 
 
   allopurinol (ZYLOPRIM) 100 mg tablet Take 1 tablet by mouth Daily. 30 tablet 11 
   [DISCONTINUED] apixaban (ELIQUIS) 2.5 mg tablet Take 1 tablet by mouth 2 times daily. 6
0 tablet 11 
   cholecalciferol (VITAMIN D-3) 2000 UNITS TABS Take 5,000 Units by mouth Every other day
.   
   cinacalcet (SENSIPAR) 30 mg tablet Take 1 tablet by mouth Daily. 30 tablet 12 
   fludrocortisone (FLORINEF) 0.1 mg tablet Take 1 tablet by mouth Every other day. 45 tab
let 3 
   furosemide (LASIX) 40 mg tablet Take 1 tablet by mouth Daily as needed. 30 tablet 5 
   glucose blood VI test strips (ONE TOUCH ULTRA TEST) strip Check blood sugar before each
 meal and as directed 100 each 12 
   insulin glargine (LANTUS) 100 units/mL injection (vial) Inject 20 Units under the skin 
every morning. 10 mL 11 
   insulin lispro (HUMALOG) 100 units/mL injection (vial) Inject subcutaneously before sara
ls according to sliding scale 10 vial 11 
   Insulin Syringe-Needle U-100 (BD INSULIN SYRINGE ULTRAFINE) 31G X 16" 0.5 ML MISC Use
 before meals and as directed. 100 each 11 
   levothyroxine (LEVOTHROID) 50 mcg tablet Take 1 tablet by mouth Daily. 30 tablet 11 
   lisinopril (PRINIVIL,ZESTRIL) 30 MG tablet Take 1 tablet by mouth Daily. 90 tablet 3 
   loperamide (ANTI-DIARRHEAL) 2 mg capsule Take 1 capsule by mouth 4 times daily as neede
d. 60 capsule 5 
   losartan (COZAAR) 50 mg tablet Take 1 tablet by mouth Daily. 90 tablet 3 
   [DISCONTINUED] losartan (COZAAR) 50 mg tablet Take 1 tablet by mouth Daily. (Patient roxanna cox differently: Take 50 mg by mouth 2 times daily.) 90 tablet 3 
   magnesium oxide (MAG-OX) 400 mg tablet Take 1 tablet by mouth 2 times daily. 62 tablet 
11 
   metoprolol tartrate (LOPRESSOR) 25 mg tablet Take 1 tablet by mouth 2 times daily. 60 t
ablet 11 
   omeprazole (PRILOSEC) 20 mg capsule Take one capsule by mouth once daily on an empty st
omach 90 capsule 4 
   Prenatal Multivit-Min-Fe-FA (PRENATAL VITAMINS) 0.8 MG TABS Take 0.8 mg by mouth Daily.
 30 each 11 
   Respiratory Therapy Supplies MISC Decrease CPAP to 12-18 cmH2O Diagnosis Code(s)327.23.
 Please send order to [facility]. 1 each 0 
   rosuvastatin (CRESTOR) 20 mg tablet Take 1 tablet by mouth nightly. 30 tablet 11 
 
 No Known Allergies
 
 Objective:   Blood pressure 149/75, temperature 36 C (96.8 F), weight 126.3 kg (278 lb 
7.1 oz).
  
  
 
 Physical Exam
 
 HEENT:  no thrush.
 Heart:  Regular rate and rhythm with no S3, S4, murmur or rub.
 Lungs:  CTA bilaterally.  No rales or wheezes.
 Abdomen:  soft, obese, renal allograft in RLQ is nontender, normoactive bowel sounds.
 Extremities:  trace edema, no clubbing, cyanosis,  no foot ulcers.
 
 Lab Results 
 Component Value Date 
  NAEX 138 2016 
  KEX 4.6 2016 
  CLEX 106 2016 
  CO2EX 18* 2016 
  BUNEX 30* 2016 
  CREEX 1.2 2016 
  EGFREX 45 2016 
 
  GLUEX 140* 2016 
  PHOSEX 2.5 2016 
  MGEX 1.6* 2016 
  PTHEX 193.0* 2016 
  SFP5CXY 8 2016 
 
 Lab Results 
 Component Value Date 
  CHOLEX 161 2016 
  HDLEX 45.4 2016 
  LDLEX 82 2016 
  TRIGEX 167 2016 
 
 Lab Results 
 Component Value Date 
  WBCEX 4.0* 2016 
  HGBEX 13.9 2016 
  HCTEX 42 2016 
  PLTEX 153 2016 
 
 Lab Results 
 Component Value Date 
  TACROLIMUSEX 8.4 2016 
 
 Lab Results 
 Component Value Date 
  UAEX Negative 2016 
  UAEX Normal 2016 
  UAEX Negative 2016 
  UAEX 5 2016 
  UAEX Negative 2016 
  UAEX 0 2016 
  UAEX 1.012 2016 
  UAEX 25* 2016 
 
 Assessment: 
 
 1.  Renal Allograft, 05 -- Scr is stable.  Her tacro. is minimally, although her dose 
has not changed in > 2 years?
 2.  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
 3.  Type 2 DM, requiring insuline--fair control.
 4.  Hyperlipidemia--on statin Rx
 5.  Hypertension--better control on her home readings.
 6.  SHPTH--stable.  
 7.  Obesity/JACK/Pulmonary hypertension-- compensated.
 8.  CAD, s/p CABG, --stable on ASA, metoprolol, atorvastatin.
 9.  Type IV RTA--stable.
 10. Chronic Low Back pain--in remission.
 11.   DJD, left knee--about same.
 12.  s/p DVT left leg, 2015--no recurrence off of apixaban.
 
 Plan: 
 
 1.  I reviewed with Viviana her  labs, BP, drug levels, and her glycemic control. I did discus
s that it would be ideal, long term, 
 to try to target her Hba1c < 7.0 % over time, to minimize end organ damage.
 2.  Will schedule her for a BMD/bone densitometry as on outpatient.
 3.  Will recheck her tacro. level next week, to verify this as her dose has not changed in 
some time.
 4.  Will plan to see her back in 6 mo. at the CKD Clinic at [facility].   She was ad
 
vised to continue her Standing Order Q 4 months, in the interim.
 
 Electronically signed by: Marzena Moser, 2016 13:34 
 
 CC:    Jose David Dalal M.D., [facility], [facility]
            Edgar Sanders MD, [facility] Orthopedics
            ONOFRE MERAZCNikkieS
 
 Electronically signed by Marzena Moser,  at 2016  2:30 PM PDTdocumented in thi
s encounter
 
 Plan of Treatment
 
 
+--------+-----------+------------+----------------------+-------------------+
| Date   | Type      | Specialty  | Care Team            | Description       |
+--------+-----------+------------+----------------------+-------------------+
| / | Office    | Cardiology |   Tonia Wilson,    |                   |
|    | Visit     |            | ARNP  [address]   |                   |
|        |           |            | [facility] [place]  |                   |
|        |           |            | [id number] [phone]  |                   |
|        |           |            |  [phone] (Fax)  |                   |
+--------+-----------+------------+----------------------+-------------------+
| / | Hospital  | Radiology  |   Popeye Townsend, |                   |
|    | Encounter |            |  MD  [address] |                   |
|        |           |            |  [place]   |                   |
|        |           |            | [place] [address]             |                   |
|        |           |            | [phone]         |                   |
|        |           |            | [phone] (Fax)   |                   |
+--------+-----------+------------+----------------------+-------------------+
| / | Surgery   | Radiology  |   Cary Yinmarissa, | CV EP PPM SYSTEM  |
|    |           |            |  MD  [address] | IMPLANT           |
|        |           |            |  St.  [place]   |                   |
|        |           |            | [place] [address]             |                   |
|        |           |            | [phone]         |                   |
|        |           |            | [phone] (Fax)   |                   |
+--------+-----------+------------+----------------------+-------------------+
| 02/10/ | Clinical  | Cardiology |                      |                   |
|    | Support   |            |                      |                   |
+--------+-----------+------------+----------------------+-------------------+
| / | Office    | Cardiology |   Tonia Wilson,    |                   |
|    | Visit     |            | ARNJESSICA  [address]   |                   |
|        |           |            | St  [place]  |                   |
|        |           |            | [address]  [phone]  |                   |
|        |           |            |  [phone] (Fax)  |                   |
+--------+-----------+------------+----------------------+-------------------+
| / | Off-Site  | Nephrology |   Marzena Moser  |                   |
2020   | Visit     |            | DO ETIENNE  [address]      |                   |
|        |           |            | [address]      |                   |
|        |           |            | [place]      |                   |
|        |           |            | [address]  [phone]  |                   |
|        |           |            |  [phone] (Fax)  |                   |
+--------+-----------+------------+----------------------+-------------------+
 
 
 
+------+------+--------+----------------------+----------------------+
| Name | Type | Priori | Associated Diagnoses | Order Schedule       |
|      |      | ty     |                      |                      |
+------+------+--------+----------------------+----------------------+
 
| TSH  | Lab  | Routin |   Kidney replaced by | Every 12 weeks for 4 |
|      |      | e      |  transplant          |  Occurrences         |
|      |      |        | Essential            | starting 2016  |
|      |      |        | hypertension with    | until 2017     |
|      |      |        | goal blood pressure  |                      |
|      |      |        | less than 130/80     |                      |
|      |      |        | Other specified      |                      |
|      |      |        | hypothyroidism  Type |                      |
|      |      |        |  2 DM with CKD stage |                      |
|      |      |        |  2 and hypertension  |                      |
|      |      |        | (Formerly [facility])                |                      |
+------+------+--------+----------------------+----------------------+
 documented as of this encounter
 
 Procedures
 
 
+----------------------+--------+-------------+----------------------+----------------------
+
| Procedure Name       | Priori | Date/Time   | Associated Diagnosis | Comments             
|
|                      | ty     |             |                      |                      
|
+----------------------+--------+-------------+----------------------+----------------------
+
| IMAGING REPORT -     |        | 2016  |                      |   Results for this   
|
| EXTERNAL SCAN        |        | 12:00 AM    |                      | procedure are in the 
|
|                      |        | PST         |                      |  results section.    
|
+----------------------+--------+-------------+----------------------+----------------------
+
| IMAGING REPORT -     |        | 2016  |                      |   Results for this   
|
| EXTERNAL SCAN        |        | 12:00 AM    |                      | procedure are in the 
|
|                      |        | PST         |                      |  results section.    
|
+----------------------+--------+-------------+----------------------+----------------------
+
| LABS - EXTERNAL SCAN |        | 2016  |                      |   Results for this   
|
|                      |        | 12:00 AM    |                      | procedure are in the 
|
|                      |        | PDT         |                      |  results section.    
|
+----------------------+--------+-------------+----------------------+----------------------
+
 documented in this encounter
 
 Results
 IMAGING REPORT - EXTERNAL SCAN (2016 12:00 AM PST)
 
+------------------------------------------------------------------------+--------------+
| Narrative                                                              | Performed At |
+------------------------------------------------------------------------+--------------+
|   This result has an attachment that is not available.  Ordered by an  |              |
| unspecified provider.                                                  |              |
+------------------------------------------------------------------------+--------------+
 
 IMAGING REPORT - EXTERNAL SCAN (2016 12:00 AM PST)
 
+------------------------------------------------------------------------+--------------+
| Narrative                                                              | Performed At |
+------------------------------------------------------------------------+--------------+
|   This result has an attachment that is not available.  Ordered by an  |              |
| unspecified provider.                                                  |              |
+------------------------------------------------------------------------+--------------+
 LABS - EXTERNAL SCAN (2016 12:00 AM PDT)
 
+------------------------------------------------------------------------+--------------+
| Narrative                                                              | Performed At |
+------------------------------------------------------------------------+--------------+
|   This result has an attachment that is not available.  Ordered by an  |              |
| unspecified provider.                                                  |              |
+------------------------------------------------------------------------+--------------+
 documented in this encounter
 
 Visit Diagnoses
 
 
+--------------------------------------------------------------------+
| Diagnosis                                                          |
+--------------------------------------------------------------------+
|   Kidney replaced by transplant - Primary                          |
+--------------------------------------------------------------------+
|   Essential hypertension with goal blood pressure less than 130/80 |
+--------------------------------------------------------------------+
|   Other specified hypothyroidism                                   |
+--------------------------------------------------------------------+
|   Type 2 DM with CKD stage 2 and hypertension (HCC)                |
+--------------------------------------------------------------------+
 documented in this encounter

## 2020-01-08 NOTE — XMS
Encounter Summary
  Created on: 2020
 
 Viviana Ricci
 External Reference #: [id number]
 : 47
 Sex: Female
 
 Demographics
 
 
+-----------------------+----------------------+
| Address               | [address]      |
|                       | [place]  [address] |
+-----------------------+----------------------+
| Home Phone            | [phone]      |
+-----------------------+----------------------+
| Preferred Language    | Unknown              |
+-----------------------+----------------------+
| Marital Status        | Single               |
+-----------------------+----------------------+
| Sabianist Affiliation | [id number]                 |
+-----------------------+----------------------+
| Race                  | Unknown              |
+-----------------------+----------------------+
| Ethnic Group          | Unknown              |
+-----------------------+----------------------+
 
 
 Author
 
 
+--------------+--------------------------------------------+
| Author       | [facility]  |
|              | and [facility]                                |
+--------------+--------------------------------------------+
| Organization | [facility]  |
|              | and [place]                                |
+--------------+--------------------------------------------+
| Address      | Unknown                                    |
+--------------+--------------------------------------------+
| Phone        | Unavailable                                |
+--------------+--------------------------------------------+
 
 
 
 Support
 
 
+----------------+--------------+---------------------+-----------------+
| Name           | Relationship | Address             | Phone           |
+----------------+--------------+---------------------+-----------------+
| Ada/Ed Radhames | ECON         | [address]              | [phone] |
|                |              | [place]  |                 |
|                |              |  [address]              |                 |
+----------------+--------------+---------------------+-----------------+
 
 
 
 
 Care Team Providers
 
 
+-----------------------+------+-------------+
| Care Team Member Name | Role | Phone       |
+-----------------------+------+-------------+
 PCP  | Unavailable |
+-----------------------+------+-------------+
 
 
 
 Reason for Visit
 Evaluate & Treat (Routine)
 
+--------+--------+------------+--------------+--------------+---------------+
| Status | Reason | Specialty  | Diagnoses /  | Referred By  | Referred To   |
|        |        |            | Procedures   | Contact      | Contact       |
+--------+--------+------------+--------------+--------------+---------------+
| Closed |        | Nephrology |   Diagnoses  |   Stroemel,  |   Stroemel,   |
|        |        |            |  Unspecified | Marzena CLIFTON,   | Marzena CLIFTON DO |
|        |        |            |              | DO  [address] |   [address]    |
|        |        |            | complication |  [address]   |
|        |        |            |  of kidney   |  [address]  [place]  | [address]  [place]    |
|        |        |            | transplant   | [place]    | [place]     |
|        |        |            | FSGS (focal  | [address]        | [address]  Phone: |
|        |        |            | segmental    | Phone:       |  [phone] |
|        |        |            | glomeruloscl | [phone] |   Fax:        |
|        |        |            | erosis)      |   Fax:       | [phone]  |
|        |        |            | Hypertension | [phone] |               |
|        |        |            | , essential  |              |               |
|        |        |            |  Procedures  |              |               |
|        |        |            |  OK OFFICE   |              |               |
|        |        |            | OUTPATIENT   |              |               |
|        |        |            | VISIT 25     |              |               |
|        |        |            | MINUTES      |              |               |
+--------+--------+------------+--------------+--------------+---------------+
 
 
 
 
 Encounter Details
 
 
+--------+-----------+---------------------+----------------------+----------------------+
| Date   | Type      | Department          | Care Team            | Description          |
+--------+-----------+---------------------+----------------------+----------------------+
| / | Off-Site  |   [facility]         |   Marzena Moser  | Kidney replaced by   |
| 2018   | Visit     | NEPHROLOGY  [address]   | M, DO  [address]      | transplant (Primary  |
|        |           | [address]   | [address]      | Dx); Hypertension,   |
|        |           | [place], [place]     | [place]      | essential; Type 2 DM |
|        |           | [address]          | [address]  [phone]  |  with CKD stage 2    |
|        |           | [phone]        |  [phone] (Fax)  | and hypertension     |
|        |           |                     |                      | (HCC); Acute         |
|        |           |                     |                      | cystitis without     |
|        |           |                     |                      | hematuria            |
+--------+-----------+---------------------+----------------------+----------------------+
 
 
 
 
 Social History
 
 
+--------------+-------+-----------+--------+------+
| Tobacco Use  | Types | Packs/Day | Years  | Date |
|              |       |           | Used   |      |
+--------------+-------+-----------+--------+------+
| Never Smoker |       |           |        |      |
+--------------+-------+-----------+--------+------+
 
 
 
+---------------------+---+---+---+
| Smokeless Tobacco:  |   |   |   |
| Never Used          |   |   |   |
+---------------------+---+---+---+
 
 
 
+---------------------------------------------------------------+
| Comments: some second hand smoke exposure, but fairly minimal |
+---------------------------------------------------------------+
 
 
 
+-------------+-------------+---------+----------+
| Alcohol Use | Drinks/Week | oz/Week | Comments |
+-------------+-------------+---------+----------+
| No          |             |         |          |
+-------------+-------------+---------+----------+
 
 
 
+------------------+---------------+
| Sex Assigned at  | Date Recorded |
| Birth            |               |
+------------------+---------------+
| Not on file      |               |
+------------------+---------------+
 
 
 
+----------------+-------------+-------------+
| Job Start Date | Occupation  | Industry    |
+----------------+-------------+-------------+
| Not on file    | Not on file | Not on file |
+----------------+-------------+-------------+
 
 
 
+----------------+--------------+------------+
| Travel History | Travel Start | Travel End |
+----------------+--------------+------------+
 
 
 
+-------------------------------------+
| No recent travel history available. |
+-------------------------------------+
 documented as of this encounter
 
 
 Last Filed Vital Signs
 
 
+-------------------+----------------------+----------------------+----------+
| Vital Sign        | Reading              | Time Taken           | Comments |
+-------------------+----------------------+----------------------+----------+
| Blood Pressure    | 144/80               | 2018  6:17 PM  |          |
|                   |                      | PDT                  |          |
+-------------------+----------------------+----------------------+----------+
| Pulse             | -                    | -                    |          |
+-------------------+----------------------+----------------------+----------+
| Temperature       | 36   C (96.8   F)    | 2018  6:17 PM  |          |
|                   |                      | PDT                  |          |
+-------------------+----------------------+----------------------+----------+
| Respiratory Rate  | -                    | -                    |          |
+-------------------+----------------------+----------------------+----------+
| Oxygen Saturation | -                    | -                    |          |
+-------------------+----------------------+----------------------+----------+
| Inhaled Oxygen    | -                    | -                    |          |
| Concentration     |                      |                      |          |
+-------------------+----------------------+----------------------+----------+
| Weight            | 117.1 kg (258 lb 2.5 | 2018  6:17 PM  |          |
|                   |  oz)                 | PDT                  |          |
+-------------------+----------------------+----------------------+----------+
| Height            | -                    | -                    |          |
+-------------------+----------------------+----------------------+----------+
| Body Mass Index   | 41.67                | 2017 10:34 AM  |          |
|                   |                      | PDT                  |          |
+-------------------+----------------------+----------------------+----------+
 documented in this encounter
 
 Progress Notes
 Marzena Moser DO - 2018  2:00 PM PDTFormatting of this note might be different
 from the original.
 Subjective:   NEPHROLOGY
  
  
 Patient ID: Viviana Ricci is a 72 y.o. female.
 
 HPI Comments:
 Followup for this very pleasant, 72 YOWF  s/p a renal allograft, 05, at [facility] with agnes
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
idism, hyperlipidemia, SHPTH,  previous low back pain, obesity/JACK, chronic pulmonary  hyper
tension, historically related to centripetal obesity, and  CAD, s/p CABG x3 vessels,. 
 
 Historically, she is very consistent about her low-salt diet and taking her immunosuppressi
on consistently.  She also is very consistent with follow-up.  She denies fever, chills, dys
uria, edema, or allograft tenderness.
 
  
 
 MEDS:
 
 Prograf 1.5 mg, BID
 Mycophenolate 250 mg, BID.
 Prednisone 5 mg, daily.
 Outpatient Prescriptions Marked as Taking for the 18 encounter (Off-Site Visit) with Maye Moser, DO 
 Medication Sig Dispense Refill 
 
   allopurinol (ZYLOPRIM) 100 mg tablet take 1 tablet by mouth once daily 30 tablet 11 
   aspirin 81 mg EC tablet Take 81 mg by mouth Daily.   
   B-D INS SYRINGE 0.5CC/31GX5/16 31G X 5/16" 0.5 ML MISC use as directed BEFORE MEALS 100
 each 11 
   cholecalciferol (VITAMIN D-3) 2000 UNITS TABS Take 5,000 Units by mouth Every other day
.   
   fludrocortisone (FLORINEF) 0.1 mg tablet Take 1 tablet by mouth Every other day. 90 tab
let 4 
   furosemide (LASIX) 40 mg tablet Take 1 tablet by mouth Daily as needed. 30 tablet 5 
   glucose blood VI test strips (ONE TOUCH ULTRA TEST) strip Check blood sugar before each
 meal and as directed 100 each 12 
   insulin lispro (HUMALOG) 100 units/mL injection (vial) inject subcutaneously BEFORE CHIKA
LS ACCORDING TO SLIDING SCALE Max dose 20 units daily 10 vial 11 
   LANTUS 100 UNIT/ML injection (vial) inject 20 units subcutaneously every morning 10 via
l 11 
   levothyroxine (SYNTHROID) 50 mcg tablet take 1 tablet by mouth once daily 30 tablet 11 
   lisinopril (PRINIVIL,ZESTRIL) 30 MG tablet take 1 tablet by mouth once daily 90 tablet 
3 
   loperamide (ANTI-DIARRHEAL) 2 mg capsule Take 1 capsule by mouth 4 times daily as neede
d. 60 capsule 5 
   losartan (COZAAR) 50 mg tablet take 1 tablet by mouth once daily 90 tablet 3 
   magnesium oxide (MAG-OX) 400 mg tablet take 1 tablet by mouth twice a day 60 tablet 11 
   metoprolol tartrate (LOPRESSOR) 25 mg tablet Take 1 tablet by mouth 2 times daily. 60 t
ablet 11 
   omeprazole (PRILOSEC) 20 mg capsule take 1 capsule by mouth once daily ON AN EMPTY STOM
ACH 90 capsule 3 
   Prenatal Vit-Fe Fumarate-FA (PNV PRENATAL PLUS MULTIVITAMIN) 27-1 MG TABS take 1 tablet
 by mouth once daily 30 tablet 11 
   Respiratory Therapy Supplies MISC Decrease CPAP to 12-18 cmH2O Diagnosis Code(s)327.23.
 Please send order to [facility]. 1 each 0 
   rosuvastatin (CRESTOR) 20 mg tablet take 1 tablet by mouth NIGHTLY 30 tablet 11 
   SENSIPAR 30 MG tablet take 1 tablet by mouth once daily 30 tablet 11 
 
 Review of Systems
 
 Objective: 
 /80  | Temp 36 C (96.8 F)  | Wt 117.1 kg (258 lb 2.5 oz)  | BMI 41.67 kg/m 
 
 Physical Exam 
 HEENT: No thrush.
 Heart:  Regular rate and rhythm with no S3, S4, murmur or rub.
 Lungs:  CTA bilaterally.  No rales or wheezes.
 Abdomen:  soft, obese, the renal allograft in the RLQ is nontender,  NABS.
 Extremities: no edema,  clubbing, cyanosis, no foot ulcers.
 
 Lab Results 
 Component Value Date 
  NAEX 139 2018 
  KEX 4.5 2018 
  CLEX 104 2018 
  CO2EX 17 (A) 2018 
  BUNEX 48 (A) 2018 
  CREEX 1.54 (A) 2018 
  EGFREX 33 2018 
  GLUEX 87 2018 
  CAEX 10.4 (A) 2018 
  PHOSEX 3.3 2018 
  PTHEX 193.0 (A) 2016 
  FTO7SPK 7.1 2018 
 
 
 Lab Results 
 Component Value Date 
  WBCEX 7.1 2018 
  HGBEX 14.7 2018 
  HCTEX 45.2 (A) 2018 
  PLTEX 135 (A) 2018 
 
 Lab Results 
 Component Value Date 
  PROTEX 0.7 (A) 2018 
  
 
 Lab Results 
 Component Value Date 
  UAEX negative 2018 
  UAEX normal 2018 
  UAEX negative 2018 
  UAEX 5 2018 
  UAEX negative 2018 
  UAEX 0 2018 
  UAEX 1.010 2018 
  UAEX 500 2018 
 
 Assessment: 
 
 1.  Renal Allograft, 05 -- her Scr is above her previous baseline.
 2.  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
 3.  Type 2 DM, requiring insuline-- good control.
 4.  Hyperlipidemia--on statin Rx
 5.  Hypertension--good control on her 2 agents.
 6.  SHPTH--she needs repeat iPTH.
 7.  Obesity/JACK/Pulmonary hypertension-- compensated.
 8.  CAD, s/p CABG  3 vessels, --stable on ASA, metoprolol, atorvastatin.
 9.  Type IV RTA--stable.
 10. Chronic Low Back pain--in remission.
 11.   DJD, left knee--about same.
  
 
 Plan: 
 
 1.   Her UA revealed some pyuria and reflexed to culture which showed >10-fifth E. coli.
 2.  The organism appears pansensitive.  Will give her amoxicillin 500 mg BID,  7 days.
 3.   Her tacro level is above goal.  Will have her decrease the tacrolimus to 1.5 mg, A.M.,
 and 1 mg, PM, to target the level 5-7  ng/ml.
 4.   I asked to to repeat her standing order and drug level in one week after that.
 5.  Will plan to see her back in 6 months at the [facility].  After the 
next lab check, she will continue to her  Standing Order Q 4 months.
 
 Electronically signed by: Marzena Moser DO on 2018 at 18:18
 
 CC:    Jose David Dalal M.D., [facility], [facility]
            Edgar Sanders MD, [facility], Orthopedics
 
 Electronically signed by Marzena Moser DO at 2018  6:41 PM PDTdocumented in thi
s encounter
 
 Plan of Treatment
 
 
+--------+-----------+------------+----------------------+-------------------+
 
| Date   | Type      | Specialty  | Care Team            | Description       |
+--------+-----------+------------+----------------------+-------------------+
| / | Office    | Cardiology |   Arlen Wilsona,    |                   |
|    | Visit     |            | [address]   |                   |
|        |           |            | St  [place], [place]  |                   |
|        |           |            | [address]  [phone]  |                   |
|        |           |            |  [phone] (Fax)  |                   |
+--------+-----------+------------+----------------------+-------------------+
| / | Hospital  | Radiology  |   Popeye Townsend, |                   |
|    | Encounter |            |  MD  [address] |                   |
|        |           |            |  [place],   |                   |
|        |           |            | [place] [address]             |                   |
|        |           |            | [phone]         |                   |
|        |           |            | [phone] (Fax)   |                   |
+--------+-----------+------------+----------------------+-------------------+
| / | Surgery   | Radiology  |   Popeye Townsend, | CV EP PPM SYSTEM  |
|    |           |            |  MD  [address] | IMPLANT           |
|        |           |            |  St.  [place],   |                   |
|        |           |            | [place] [address]             |                   |
|        |           |            | [phone]         |                   |
|        |           |            | [phone] (Fax)   |                   |
+--------+-----------+------------+----------------------+-------------------+
| 02/10/ | Clinical  | Cardiology |                      |                   |
|    | Support   |            |                      |                   |
+--------+-----------+------------+----------------------+-------------------+
| / | Office    | Cardiology |   KatieTonia,    |                   |
|    | Visit     |            | POP  [address]   |                   |
|        |           |            | [place]  |                   |
|        |           |            | [id number] [phone]  |                   |
|        |           |            |  [phone] (Fax)  |                   |
+--------+-----------+------------+----------------------+-------------------+
| / | Off-Site  | Nephrology |   Marzena Moser  |                   |
|    | Visit     |            | DO [place]TI[place]  [address] [place] [address]      |                   |
|        |           |            | [address]      |                   |
|        |           |            | [place]      |                   |
|        |           |            | [address]  [phone]  |                   |
|        |           |            |  [phone] (Fax)  |                   |
+--------+-----------+------------+----------------------+-------------------+
 documented as of this encounter
 
 Procedures
 
 
+----------------+--------+------------+----------------------+----------------------+
| Procedure Name | Priori | Date/Time  | Associated Diagnosis | Comments             |
|                | ty     |            |                      |                      |
+----------------+--------+------------+----------------------+----------------------+
| EXTERNAL LAB:  | Routin | 2018 |   Kidney replaced by |   Results for this   |
| HEMOGLOBIN A1C | e      |            |  transplant          | procedure are in the |
|                |        |            | Hypertension,        |  results section.    |
|                |        |            | essential  Type 2 DM |                      |
|                |        |            |  with CKD stage 2    |                      |
|                |        |            | and hypertension     |                      |
|                |        |            | (HCC)  Acute         |                      |
|                |        |            | cystitis without     |                      |
|                |        |            | hematuria            |                      |
+----------------+--------+------------+----------------------+----------------------+
 documented in this encounter
 
 Results
 
 External Lab: Hemoglobin A1c (2018)
 
+-------------+-------+-----------+------------+--------------+
| Component   | Value | Ref Range | Performed  | Pathologist  |
|             |       |           | At         | Signature    |
+-------------+-------+-----------+------------+--------------+
| Hemoglobin  | 7.1   | %         |            |              |
| A1c,        |       |           |            |              |
| external    |       |           |            |              |
+-------------+-------+-----------+------------+--------------+
 
 
 
+----------+
| Specimen |
+----------+
| Blood    |
+----------+
 documented in this encounter
 
 Visit Diagnoses
 
 
+---------------------------------------------------------------+
| Diagnosis                                                     |
+---------------------------------------------------------------+
|   Kidney replaced by transplant - Primary                     |
+---------------------------------------------------------------+
|   Hypertension, essential  Unspecified essential hypertension |
+---------------------------------------------------------------+
|   Type 2 DM with CKD stage 2 and hypertension (HCC)           |
+---------------------------------------------------------------+
|   Acute cystitis without hematuria  Acute cystitis            |
+---------------------------------------------------------------+
 documented in this encounter

## 2020-01-08 NOTE — XMS
Encounter Summary
  Created on: 2020
 
 Viviana Ricci
 External Reference #: [id number]
 : 47
 Sex: Female
 
 Demographics
 
 
+-----------------------+----------------------+
| Address               | [address]      |
|                       | [place]  [address] |
+-----------------------+----------------------+
| Home Phone            | [phone]      |
+-----------------------+----------------------+
| Preferred Language    | Unknown              |
+-----------------------+----------------------+
| Marital Status        | Single               |
+-----------------------+----------------------+
| Congregational Affiliation | 1009                 |
+-----------------------+----------------------+
| Race                  | Unknown              |
+-----------------------+----------------------+
| Ethnic Group          | Unknown              |
+-----------------------+----------------------+
 
 
 Author
 
 
+--------------+--------------------------------------------+
| Author       | [facility]  |
|              | and [place]                                |
+--------------+--------------------------------------------+
| Organization | [facility]  |
|              | and [place]                                |
+--------------+--------------------------------------------+
| Address      | Unknown                                    |
+--------------+--------------------------------------------+
| Phone        | Unavailable                                |
+--------------+--------------------------------------------+
 
 
 
 Support
 
 
+----------------+--------------+---------------------+-----------------+
| Name           | Relationship | Address             | Phone           |
+----------------+--------------+---------------------+-----------------+
| Ada/Ed Radhames | ECON         | [address]              | [phone] |
|                |              | [place]  |                 |
|                |              |  [address]              |                 |
+----------------+--------------+---------------------+-----------------+
 
 
 
 
 Care Team Providers
 
 
+-----------------------+------+-------------+
| Care Team Member Name | Role | Phone       |
+-----------------------+------+-------------+
 PCP  | Unavailable |
+-----------------------+------+-------------+
 
 
 
 Encounter Details
 
 
+--------+----------+---------------------+----------------------+-------------+
| Date   | Type     | Department          | Care Team            | Description |
+--------+----------+---------------------+----------------------+-------------+
| / | Abstract |   [facility]  [place]         |   Marzena Moser  |             |
|    |          | NEPHROLOGY  [address]   | M, DO  [address]      |             |
|        |          | [address]   | [address]      |             |
|        |          | [place], [place]     | [place]      |             |
|        |          | [address]          | [address]  [phone]  |             |
|        |          | [phone]        |  [phone] (Fax)  |             |
+--------+----------+---------------------+----------------------+-------------+
 
 
 
 Social History
 
 
+--------------+-------+-----------+--------+------+
| Tobacco Use  | Types | Packs/Day | Years  | Date |
|              |       |           | Used   |      |
+--------------+-------+-----------+--------+------+
| Never Smoker |       |           |        |      |
+--------------+-------+-----------+--------+------+
 
 
 
+---------------------+---+---+---+
| Smokeless Tobacco:  |   |   |   |
| Never Used          |   |   |   |
+---------------------+---+---+---+
 
 
 
+---------------------------------------------------------------+
| Comments: some second hand smoke exposure, but fairly minimal |
+---------------------------------------------------------------+
 
 
 
+-------------+-------------+---------+----------+
| Alcohol Use | Drinks/Week | oz/Week | Comments |
+-------------+-------------+---------+----------+
| No          |             |         |          |
+-------------+-------------+---------+----------+
 
 
 
 
+------------------+---------------+
| Sex Assigned at  | Date Recorded |
| Birth            |               |
+------------------+---------------+
| Not on file      |               |
+------------------+---------------+
 
 
 
+----------------+-------------+-------------+
| Job Start Date | Occupation  | Industry    |
+----------------+-------------+-------------+
| Not on file    | Not on file | Not on file |
+----------------+-------------+-------------+
 
 
 
+----------------+--------------+------------+
| Travel History | Travel Start | Travel End |
+----------------+--------------+------------+
 
 
 
+-------------------------------------+
| No recent travel history available. |
+-------------------------------------+
 documented as of this encounter
 
 Plan of Treatment
 
 
+--------+-----------+------------+----------------------+-------------------+
| Date   | Type      | Specialty  | Care Team            | Description       |
+--------+-----------+------------+----------------------+-------------------+
| / | Office    | Cardiology |   Tonia Wilson,    |                   |
|    | Visit     |            | ARNJESSICA  [address]   |                   |
|        |           |            | [place]  |                   |
|        |           |            | [address]  [phone]  |                   |
|        |           |            |  [phone] (Fax)  |                   |
+--------+-----------+------------+----------------------+-------------------+
| / | Hospital  | Radiology  |   Popeye Townsend, |                   |
|    | Encounter |            |  MD  [address] |                   |
|        |           |            |  St.  [place],   |                   |
|        |           |            | [place] [address]             |                   |
|        |           |            | [phone]         |                   |
|        |           |            | [phone] (Fax)   |                   |
+--------+-----------+------------+----------------------+-------------------+
| / | Surgery   | Radiology  |   Popeye Townsend, | CV EP PPM SYSTEM  |
|    |           |            |  MD  [address] | IMPLANT           |
|        |           |            |  St.  [place],   |                   |
|        |           |            | [place] [address]             |                   |
|        |           |            | [phone]         |                   |
|        |           |            | [phone] (Fax)   |                   |
+--------+-----------+------------+----------------------+-------------------+
| 02/10/ | Clinical  | Cardiology |                      |                   |
|    | Support   |            |                      |                   |
+--------+-----------+------------+----------------------+-------------------+
| / | Office    | Cardiology |   Tonia Wilson,    |                   |
|    | Visit     |            | ARNP  [address]   |                   |
 
|        |           |            | St  [place], [place]  |                   |
|        |           |            | [address]  [phone]  |                   |
|        |           |            |  [phone] (Fax)  |                   |
+--------+-----------+------------+----------------------+-------------------+
| / | Off-Site  | Nephrology |   Marzena Moser  |                   |
|    | Visit     |            | ETIENNE,   [address] [place] [address]      |                   |
|        |           |            | [address]      |                   |
|        |           |            | [place]      |                   |
|        |           |            | [address]  [phone]  |                   |
|        |           |            |  [phone] (Fax)  |                   |
+--------+-----------+------------+----------------------+-------------------+
 documented as of this encounter
 
 Procedures
 
 
+---------------------+--------+------------+----------------------+----------------------+
| Procedure Name      | Priori | Date/Time  | Associated Diagnosis | Comments             |
|                     | ty     |            |                      |                      |
+---------------------+--------+------------+----------------------+----------------------+
| TACROLIMUS ()  | Routin | 2014 |                      |   Results for this   |
| TROUGH              | e      |            |                      | procedure are in the |
|                     |        |            |                      |  results section.    |
+---------------------+--------+------------+----------------------+----------------------+
 documented in this encounter
 
 Results
 Tacrolimus () Level (2014)
 
+-------------+-------+-----------+------------+--------------+
| Component   | Value | Ref Range | Performed  | Pathologist  |
|             |       |           | At         | Signature    |
+-------------+-------+-----------+------------+--------------+
| Tacrolimus  | 5.5   |           | EXTERNAL   |              |
| Level       |       |           | LAB        |              |
+-------------+-------+-----------+------------+--------------+
 
 
 
+-----------------+
| Specimen        |
+-----------------+
| Blood specimen  |
| (specimen)      |
+-----------------+
 
 
 
+----------------+---------+--------------------+--------------+
| Performing     | Address | City/State/Zipcode | Phone Number |
| Organization   |         |                    |              |
+----------------+---------+--------------------+--------------+
|   EXTERNAL LAB |         |                    |              |
+----------------+---------+--------------------+--------------+
 documented in this encounter
 
 Visit Diagnoses
 Not on filedocumented in this encounter

## 2020-01-08 NOTE — XMS
Encounter Summary
  Created on: 2020
 
 Viviana Ricci
 External Reference #: [id number]
 : 47
 Sex: Female
 
 Demographics
 
 
+-----------------------+----------------------+
| Address               | [address]      |
|                       | [place]  [address] |
+-----------------------+----------------------+
| Home Phone            | [phone]      |
+-----------------------+----------------------+
| Preferred Language    | Unknown              |
+-----------------------+----------------------+
| Marital Status        | Single               |
+-----------------------+----------------------+
| Buddhist Affiliation | 1009                 |
+-----------------------+----------------------+
| Race                  | Unknown              |
+-----------------------+----------------------+
| Ethnic Group          | Unknown              |
+-----------------------+----------------------+
 
 
 Author
 
 
+--------------+--------------------------------------------+
| Author       | [facility]  |
|              | and [place]                                |
+--------------+--------------------------------------------+
| Organization | [facility]  |
|              | and [place]                                |
+--------------+--------------------------------------------+
| Address      | Unknown                                    |
+--------------+--------------------------------------------+
| Phone        | Unavailable                                |
+--------------+--------------------------------------------+
 
 
 
 Support
 
 
+----------------+--------------+---------------------+-----------------+
| Name           | Relationship | Address             | Phone           |
+----------------+--------------+---------------------+-----------------+
| Ada/Ed Radhames | ECON         | [address]              | [phone] |
|                |              | [place]  |                 |
|                |              |  [address]              |                 |
+----------------+--------------+---------------------+-----------------+
 
 
 
 
 Care Team Providers
 
 
+-----------------------+------+-------------+
| Care Team Member Name | Role | Phone       |
+-----------------------+------+-------------+
 PCP  | Unavailable |
+-----------------------+------+-------------+
 
 
 
 Encounter Details
 
 
+--------+----------+---------------------+----------------------+-------------+
| Date   | Type     | Department          | Care Team            | Description |
+--------+----------+---------------------+----------------------+-------------+
| / | Abstract |   [facility] [place]         |   Marzena Moser  |             |
| 2017   |          | NEPHROLOGY  [address]   | M, DO  [address]      |             |
|        |          | [address]   | [address]      |             |
|        |          | [place], [place]     | [place]      |             |
|        |          | [address]          | [address]  [phone]  |             |
|        |          | [phone]        |  [phone] (Fax)  |             |
+--------+----------+---------------------+----------------------+-------------+
 
 
 
 Social History
 
 
+--------------+-------+-----------+--------+------+
| Tobacco Use  | Types | Packs/Day | Years  | Date |
|              |       |           | Used   |      |
+--------------+-------+-----------+--------+------+
| Never Smoker |       |           |        |      |
+--------------+-------+-----------+--------+------+
 
 
 
+---------------------+---+---+---+
| Smokeless Tobacco:  |   |   |   |
| Never Used          |   |   |   |
+---------------------+---+---+---+
 
 
 
+---------------------------------------------------------------+
| Comments: some second hand smoke exposure, but fairly minimal |
+---------------------------------------------------------------+
 
 
 
+-------------+-------------+---------+----------+
| Alcohol Use | Drinks/Week | oz/Week | Comments |
+-------------+-------------+---------+----------+
| No          |             |         |          |
+-------------+-------------+---------+----------+
 
 
 
 
+------------------+---------------+
| Sex Assigned at  | Date Recorded |
| Birth            |               |
+------------------+---------------+
| Not on file      |               |
+------------------+---------------+
 
 
 
+----------------+-------------+-------------+
| Job Start Date | Occupation  | Industry    |
+----------------+-------------+-------------+
| Not on file    | Not on file | Not on file |
+----------------+-------------+-------------+
 
 
 
+----------------+--------------+------------+
| Travel History | Travel Start | Travel End |
+----------------+--------------+------------+
 
 
 
+-------------------------------------+
| No recent travel history available. |
+-------------------------------------+
 documented as of this encounter
 
 Progress Maite Perez - 2017  8:50 AM PSTOutside record:  Refill authorization request for
luis m from [facility] AfterYes, dos:  17.  Sent to scan.Electronically signed by Maite Raygoza at 2017  8:50 AM PSTdocumented in this encounter
 
 Plan of Treatment
 
 
+--------+-----------+------------+----------------------+-------------------+
| Date   | Type      | Specialty  | Care Team            | Description       |
+--------+-----------+------------+----------------------+-------------------+
| / | Office    | Cardiology |   Tonia Wilson,    |                   |
| 2020   | Visit     |            | ARNJESSICA  [address]   |                   |
|        |           |            | St  [facility] [place]  |                   |
|        |           |            | [address]  [phone]  |                   |
|        |           |            |  [phone] (Fax)  |                   |
+--------+-----------+------------+----------------------+-------------------+
| / | Hospital  | Radiology  |   Popeye Townsend, |                   |
|    | Encounter |            |  MD  [address] |                   |
|        |           |            |  St.  [place],   |                   |
|        |           |            | [place] [address]             |                   |
|        |           |            | [phone]         |                   |
|        |           |            | [phone] (Fax)   |                   |
+--------+-----------+------------+----------------------+-------------------+
| / | Surgery   | Radiology  |   Popeye Townsend, | CV EP PPM SYSTEM  |
|    |           |            |  MD  [address] | IMPLANT           |
|        |           |            |  St.  [place],   |                   |
|        |           |            | [place] [address]             |                   |
|        |           |            | [phone]         |                   |
|        |           |            | [phone] (Fax)   |                   |
+--------+-----------+------------+----------------------+-------------------+
 
| 02/10/ | Clinical  | Cardiology |                      |                   |
2020   | Support   |            |                      |                   |
+--------+-----------+------------+----------------------+-------------------+
| / | Office    | Cardiology |   Tonia Wilson,    |                   |
|    | Visit     |            | BELKIS  [address]   |                   |
|        |           |            | [place]  |                   |
|        |           |            | [address]  [phone]  |                   |
|        |           |            |  [phone] (Fax)  |                   |
+--------+-----------+------------+----------------------+-------------------+
| / | Off-Site  | Nephrology |   Marzena Moser  |                   |
|    | Visit     |            | DO LUIS M  [address] [place] [address]      |                   |
|        |           |            | [address]      |                   |
|        |           |            | [place]      |                   |
|        |           |            | [address]  [phone]  |                   |
|        |           |            |  [phone] (Fax)  |                   |
+--------+-----------+------------+----------------------+-------------------+
 documented as of this encounter
 
 Visit Diagnoses
 Not on filedocumented in this encounter

## 2020-01-08 NOTE — XMS
Encounter Summary
  Created on: 2020
 
 Viviana Ricci
 External Reference #: [id number]
 : 47
 Sex: Female
 
 Demographics
 
 
+-----------------------+----------------------+
| Address               | [address]      |
|                       | [place]  [address] |
+-----------------------+----------------------+
| Home Phone            | [phone]      |
+-----------------------+----------------------+
| Preferred Language    | Unknown              |
+-----------------------+----------------------+
| Marital Status        | Single               |
+-----------------------+----------------------+
| Pentecostal Affiliation | 1009                 |
+-----------------------+----------------------+
| Race                  | Unknown              |
+-----------------------+----------------------+
| Ethnic Group          | Unknown              |
+-----------------------+----------------------+
 
 
 Author
 
 
+--------------+--------------------------------------------+
| Author       | [facility]  |
|              | and [place]                                |
+--------------+--------------------------------------------+
| Organization | [facility]  |
|              | and [place]                                |
+--------------+--------------------------------------------+
| Address      | Unknown                                    |
+--------------+--------------------------------------------+
| Phone        | Unavailable                                |
+--------------+--------------------------------------------+
 
 
 
 Support
 
 
+----------------+--------------+---------------------+-----------------+
| Name           | Relationship | Address             | Phone           |
+----------------+--------------+---------------------+-----------------+
| Ada/Ed Radhames | ECON         | [address]              | [phone] |
|                |              | [place]  |                 |
|                |              |  [address]              |                 |
+----------------+--------------+---------------------+-----------------+
 
 
 
 
 Care Team Providers
 
 
+------------------------+------+-----------------+
| Care Team Member Name  | Role | Phone           |
+------------------------+------+-----------------+
| Marzena Moser DO | PCP  | [phone] |
+------------------------+------+-----------------+
 
 
 
 Encounter Details
 
 
+--------+-------------+---------------------+----------------------+-------------+
| Date   | Type        | Department          | Care Team            | Description |
+--------+-------------+---------------------+----------------------+-------------+
| / | Documentati |   [facility] [place]         |   Marzena Moser  |             |
| 2019   | on          | NEPHROLOGY  [address]   | M, DO  [address]      |             |
|        |             | [address]   | [address]      |             |
|        |             | [place], [place]     | [place], [place]      |             |
|        |             | [address]          | [address]  [phone]  |             |
|        |             | [phone]        |  [phone] (Fax)  |             |
+--------+-------------+---------------------+----------------------+-------------+
 
 
 
 Social History
 
 
+--------------+-------+-----------+--------+------+
| Tobacco Use  | Types | Packs/Day | Years  | Date |
|              |       |           | Used   |      |
+--------------+-------+-----------+--------+------+
| Never Smoker |       |           |        |      |
+--------------+-------+-----------+--------+------+
 
 
 
+---------------------+---+---+---+
| Smokeless Tobacco:  |   |   |   |
| Never Used          |   |   |   |
+---------------------+---+---+---+
 
 
 
+---------------------------------------------------------------+
| Comments: some second hand smoke exposure, but fairly minimal |
+---------------------------------------------------------------+
 
 
 
+-------------+-------------+---------+----------+
| Alcohol Use | Drinks/Week | oz/Week | Comments |
+-------------+-------------+---------+----------+
| No          |             |         |          |
+-------------+-------------+---------+----------+
 
 
 
 
+------------------+---------------+
| Sex Assigned at  | Date Recorded |
| Birth            |               |
+------------------+---------------+
| Not on file      |               |
+------------------+---------------+
 
 
 
+----------------+-------------+-------------+
| Job Start Date | Occupation  | Industry    |
+----------------+-------------+-------------+
| Not on file    | Not on file | Not on file |
+----------------+-------------+-------------+
 
 
 
+----------------+--------------+------------+
| Travel History | Travel Start | Travel End |
+----------------+--------------+------------+
 
 
 
+-------------------------------------+
| No recent travel history available. |
+-------------------------------------+
 documented as of this encounter
 
 Progress Maite Perez - 2019  9:04 AM PDTMammogram report, dos 19 from [facility].  Sent to scan.Electronically signed by Maite Raygoza at 2019  9:06 AM PDTdoc
umented in this encounter
 
 Plan of Treatment
 
 
+--------+-----------+------------+----------------------+-------------------+
| Date   | Type      | Specialty  | Care Team            | Description       |
+--------+-----------+------------+----------------------+-------------------+
| / | Office    | Cardiology |   Tonia Wilson,    |                   |
|    | Visit     |            | ARNJESSICA  [address]   |                   |
|        |           |            | St  [place] [facility], [place]  |                   |
|        |           |            | [address]  [phone]  |                   |
|        |           |            |  [phone] (Fax)  |                   |
+--------+-----------+------------+----------------------+-------------------+
| / | Hospital  | Radiology  |   Popeye Townsend, |                   |
|    | Encounter |            |  MD  [address] |                   |
|        |           |            |  [place],   |                   |
|        |           |            | [place] [address]             |                   |
|        |           |            | [phone]         |                   |
|        |           |            | [phone] (Fax)   |                   |
+--------+-----------+------------+----------------------+-------------------+
| / | Surgery   | Radiology  |   Popeye Townsend, | CV EP PPM SYSTEM  |
|    |           |            |  MD  [address] | IMPLANT           |
|        |           |            |  [place],   |                   |
|        |           |            | [place] [address]             |                   |
|        |           |            | [phone]         |                   |
|        |           |            | [phone] (Fax)   |                   |
+--------+-----------+------------+----------------------+-------------------+
 
| 02/10/ | Clinical  | Cardiology |                      |                   |
|    | Support   |            |                      |                   |
+--------+-----------+------------+----------------------+-------------------+
| / | Office    | Cardiology |   Tonia Wilson,    |                   |
|    | Visit     |            | BELKIS  [address]   |                   |
|        |           |            | [place]  |                   |
|        |           |            | [address]  [phone]  |                   |
|        |           |            |  [phone] (Fax)  |                   |
+--------+-----------+------------+----------------------+-------------------+
| / | Off-Site  | Nephrology |   Marzena Moser  |                   |
|    | Visit     |            | DO ETIENNE  [address] [place] [address]      |                   |
|        |           |            | [address]      |                   |
|        |           |            | [place]      |                   |
|        |           |            | [address] [phone]  |                   |
|        |           |            |  [phone] (Fax)  |                   |
+--------+-----------+------------+----------------------+-------------------+
 documented as of this encounter
 
 Visit Diagnoses
 Not on filedocumented in this encounter

## 2020-01-08 NOTE — XMS
Encounter Summary
  Created on: 2020
 
 Viviana Ricci
 External Reference #: [id number]
 : 47
 Sex: Female
 
 Demographics
 
 
+-----------------------+----------------------+
| Address               | [address]      |
|                       | [place]  [address] |
+-----------------------+----------------------+
| Home Phone            | [phone]      |
+-----------------------+----------------------+
| Preferred Language    | Unknown              |
+-----------------------+----------------------+
| Marital Status        | Single               |
+-----------------------+----------------------+
| Hinduism Affiliation | 1009                 |
+-----------------------+----------------------+
| Race                  | Unknown              |
+-----------------------+----------------------+
| Ethnic Group          | Unknown              |
+-----------------------+----------------------+
 
 
 Author
 
 
+--------------+--------------------------------------------+
| Author       | [facility]  |
|              | and [place]                                |
+--------------+--------------------------------------------+
| Organization | [facility]  |
|              | and [place]                                |
+--------------+--------------------------------------------+
| Address      | Unknown                                    |
+--------------+--------------------------------------------+
| Phone        | Unavailable                                |
+--------------+--------------------------------------------+
 
 
 
 Support
 
 
+----------------+--------------+---------------------+-----------------+
| Name           | Relationship | Address             | Phone           |
+----------------+--------------+---------------------+-----------------+
| Ada/Ed Radhames | ECON         | [address]              | [phone] |
|                |              | [place]  |                 |
|                |              |  [address]              |                 |
+----------------+--------------+---------------------+-----------------+
 
 
 
 
 Care Team Providers
 
 
+-----------------------+------+-------------+
| Care Team Member Name | Role | Phone       |
+-----------------------+------+-------------+
 PCP  | Unavailable |
+-----------------------+------+-------------+
 
 
 
 Encounter Details
 
 
+--------+----------+---------------------+----------------------+-------------+
| Date   | Type     | Department          | Care Team            | Description |
+--------+----------+---------------------+----------------------+-------------+
| / | Abstract |   [facility] [place]         |   Marzena Moser  |             |
|    |          | NEPHROLOGY  [address]   | M, DO  [address]      |             |
|        |          | [address]   | [address]      |             |
|        |          | [place], [place]     | [place]      |             |
|        |          | [address]          | [address]  [phone]  |             |
|        |          | [phone]        |  [phone] (Fax)  |             |
+--------+----------+---------------------+----------------------+-------------+
 
 
 
 Social History
 
 
+--------------+-------+-----------+--------+------+
| Tobacco Use  | Types | Packs/Day | Years  | Date |
|              |       |           | Used   |      |
+--------------+-------+-----------+--------+------+
| Never Smoker |       |           |        |      |
+--------------+-------+-----------+--------+------+
 
 
 
+---------------------+---+---+---+
| Smokeless Tobacco:  |   |   |   |
| Never Used          |   |   |   |
+---------------------+---+---+---+
 
 
 
+-------------+-------------+---------+----------+
| Alcohol Use | Drinks/Week | oz/Week | Comments |
+-------------+-------------+---------+----------+
| No          |             |         |          |
+-------------+-------------+---------+----------+
 
 
 
+------------------+---------------+
| Sex Assigned at  | Date Recorded |
| Birth            |               |
+------------------+---------------+
| Not on file      |               |
 
+------------------+---------------+
 
 
 
+----------------+-------------+-------------+
| Job Start Date | Occupation  | Industry    |
+----------------+-------------+-------------+
| Not on file    | Not on file | Not on file |
+----------------+-------------+-------------+
 
 
 
+----------------+--------------+------------+
| Travel History | Travel Start | Travel End |
+----------------+--------------+------------+
 
 
 
+-------------------------------------+
| No recent travel history available. |
+-------------------------------------+
 documented as of this encounter
 
 Plan of Treatment
 
 
+--------+-----------+------------+----------------------+-------------------+
| Date   | Type      | Specialty  | Care Team            | Description       |
+--------+-----------+------------+----------------------+-------------------+
| / | Office    | Cardiology |   Tonia Wilson,    |                   |
|    | Visit     |            | ARNP  [address]   |                   |
|        |           |            | St [place]  |                   |
|        |           |            | [address] [phone]  |                   |
|        |           |            |  [phone] (Fax)  |                   |
+--------+-----------+------------+----------------------+-------------------+
| / | Hospital  | Radiology  |   Popeye Townsend, |                   |
|    | Encounter |            |  MD  [address] |                   |
|        |           |            |  [place]   |                   |
|        |           |            | [place] [address]             |                   |
|        |           |            | [phone]         |                   |
|        |           |            | [phone] (Fax)   |                   |
+--------+-----------+------------+----------------------+-------------------+
| / | Surgery   | Radiology  |   CaryYinmarissa, | CV EP PPM SYSTEM  |
2020   |           |            |  MD  [address] | IMPLANT           |
|        |           |            |  [place],   |                   |
|        |           |            | [place] [address]             |                   |
|        |           |            | [phone]         |                   |
|        |           |            | [phone] (Fax)   |                   |
+--------+-----------+------------+----------------------+-------------------+
| 02/10/ | Clinical  | Cardiology |                      |                   |
|    | Support   |            |                      |                   |
+--------+-----------+------------+----------------------+-------------------+
| / | Office    | Cardiology |   Tonia Wilson,    |                   |
2020   | Visit     |            | BELKIS  [address]   |                   |
|        |           |            |   [place]  |                   |
|        |           |            | [address]  [phone]  |                   |
|        |           |            |  [phone] (Fax)  |                   |
+--------+-----------+------------+----------------------+-------------------+
| / | Off-Site  | Nephrology |   Marzena Moser  |                   |
2020   | Visit     |            | DO [address]      |                   |
 
|        |           |            | [address]      |                   |
|        |           |            | [place]      |                   |
|        |           |            | [address]  [phone]  |                   |
|        |           |            |  [phone] (Fax)  |                   |
+--------+-----------+------------+----------------------+-------------------+
 documented as of this encounter
 
 Procedures
 
 
+---------------------+--------+------------+----------------------+----------------------+
| Procedure Name      | Priori | Date/Time  | Associated Diagnosis | Comments             |
|                     | ty     |            |                      |                      |
+---------------------+--------+------------+----------------------+----------------------+
| TACROLIMUS ()  | Routin | 2013 |                      |   Results for this   |
| TROUGH              | e      |            |                      | procedure are in the |
|                     |        |            |                      |  results section.    |
+---------------------+--------+------------+----------------------+----------------------+
 documented in this encounter
 
 Results
 Tacrolimus () Level (2013)
 
+-------------+-------+-----------+-------------+--------------+
| Component   | Value | Ref Range | Performed   | Pathologist  |
|             |       |           | At          | Signature    |
+-------------+-------+-----------+-------------+--------------+
| Tacrolimus  | 5.8   |           | [facility]  |              |
| Level       |       |           | [facility]    |              |
|             |       |           | [facility]     |              |
|             |       |           | [facility]    |              |
|             |       |           | LABORATORY  |              |
+-------------+-------+-----------+-------------+--------------+
 
 
 
+-----------------+
| Specimen        |
+-----------------+
| Blood specimen  |
| (specimen)      |
+-----------------+
 
 
 
+----------------------+--------------------+--------------------+----------------+
| Performing           | Address            | City/State/Zipcode | Phone Number   |
| Organization         |                    |                    |                |
+----------------------+--------------------+--------------------+----------------+
|   [facility]     |   [address] | [place]    |   [phone] |
| [facility]  |                    | [address]              |                |
| - LABORATORY         |                    |                    |                |
+----------------------+--------------------+--------------------+----------------+
|   [facility]     |   [address] | [place]    |                |
| [facility]  |                    | [address]              |                |
| - LABORATORY         |                    |                    |                |
+----------------------+--------------------+--------------------+----------------+
 documented in this encounter
 
 Visit Diagnoses
 
 Not on filedocumented in this encounter

## 2020-01-08 NOTE — XMS
Encounter Summary
  Created on: 2020
 
 Viviana Ricci
 External Reference #: [id number]
 : 47
 Sex: Female
 
 Demographics
 
 
+-----------------------+----------------------+
| Address               | [address]      |
|                       | [place]  [address] |
+-----------------------+----------------------+
| Home Phone            | [phone]      |
+-----------------------+----------------------+
| Preferred Language    | Unknown              |
+-----------------------+----------------------+
| Marital Status        | Single               |
+-----------------------+----------------------+
| [employer] Affiliation | [id number]                 |
+-----------------------+----------------------+
| Race                  | Unknown              |
+-----------------------+----------------------+
| Ethnic Group          | Unknown              |
+-----------------------+----------------------+
 
 
 Author
 
 
+--------------+--------------------------------------------+
| Author       | [facility]  |
|              | and [facility]                                |
+--------------+--------------------------------------------+
| Organization | [facility]  |
|              | and [facility]                                |
+--------------+--------------------------------------------+
| Address      | Unknown                                    |
+--------------+--------------------------------------------+
| Phone        | Unavailable                                |
+--------------+--------------------------------------------+
 
 
 
 Support
 
 
+----------------+--------------+---------------------+-----------------+
| Name           | Relationship | Address             | Phone           |
+----------------+--------------+---------------------+-----------------+
| Ada/Ed Radhames | ECON         | [address]              | [phone] |
|                |              | [place]  |                 |
|                |              |  [id number]              |                 |
+----------------+--------------+---------------------+-----------------+
 
 
 
 
 Care Team Providers
 
 
+-----------------------+------+-------------+
| Care Team Member Name | Role | Phone       |
+-----------------------+------+-------------+
 PCP  | Unavailable |
+-----------------------+------+-------------+
 
 
 
 Encounter Details
 
 
+--------+-----------+----------------------+----------------------+-------------+
| Date   | Type      | Department           | Care Team            | Description |
+--------+-----------+----------------------+----------------------+-------------+
| / | [facility]  |   [facility] |   Abby,       |             |
| 2006   | Encounter |  [facility]   | Jono LEMA MD  [address]  |             |
|        |           | [place]  [address] | [address] [place]     |             |
|        |           |   [place]    | [place] [address] |             |
|        |           | [address]           |   [phone]       |             |
|        |           | [phone]         | [phone] (Fax)   |             |
+--------+-----------+----------------------+----------------------+-------------+
 
 
 
 Social History
 
 
+----------------+-------+-----------+--------+------+
| Tobacco Use    | Types | Packs/Day | Years  | Date |
|                |       |           | Used   |      |
+----------------+-------+-----------+--------+------+
| Never Assessed |       |           |        |      |
+----------------+-------+-----------+--------+------+
 
 
 
+------------------+---------------+
| Sex Assigned at  | Date Recorded |
| Birth            |               |
+------------------+---------------+
| Not on file      |               |
+------------------+---------------+
 
 
 
+----------------+-------------+-------------+
| Job Start Date | Occupation  | Industry    |
+----------------+-------------+-------------+
| Not on file    | Not on file | Not on file |
+----------------+-------------+-------------+
 
 
 
+----------------+--------------+------------+
| Travel History | Travel Start | Travel End |
+----------------+--------------+------------+
 
 
 
 
+-------------------------------------+
| No recent travel history available. |
+-------------------------------------+
 documented as of this encounter
 
 Plan of Treatment
 
 
+--------+-----------+------------+----------------------+-------------------+
| Date   | Type      | Specialty  | Care Team            | Description       |
+--------+-----------+------------+----------------------+-------------------+
| / | Office    | Cardiology |   Tonia Wilson,    |                   |
| 2020   | Visit     |            | ARNJESSICA  [address]   |                   |
|        |           |            | [place], [place]  |                   |
|        |           |            | [address]  [phone]  |                   |
|        |           |            |  [phone] (Fax)  |                   |
+--------+-----------+------------+----------------------+-------------------+
| / | Hospital  | Radiology  |   Popeye Townsend, |                   |
|    | Encounter |            |  MD [address] |                   |
|        |           |            |  [place],   |                   |
|        |           |            | [place] [address]             |                   |
|        |           |            | [phone]         |                   |
|        |           |            | [phone] (Fax)   |                   |
+--------+-----------+------------+----------------------+-------------------+
| / | Surgery   | Radiology  |   Popeye Townsend, | CV EP PPM SYSTEM  |
|    |           |            |  MD  [address] | IMPLANT           |
|        |           |            |  [place],   |                   |
|        |           |            | [place] [address]             |                   |
|        |           |            | [phone]         |                   |
|        |           |            | [phone] (Fax)   |                   |
+--------+-----------+------------+----------------------+-------------------+
| 02/10/ | Clinical  | Cardiology |                      |                   |
|    | Support   |            |                      |                   |
+--------+-----------+------------+----------------------+-------------------+
| / | Office    | Cardiology |   Tonia Wilson,    |                   |
|    | Visit     |            | [place]  [address]   |                   |
|        |           |            | [place]  |                   |
|        |           |            | [address]  [phone]  |                   |
|        |           |            |  [phone] (Fax)  |                   |
+--------+-----------+------------+----------------------+-------------------+
| / | Off-Site  | Nephrology |   Marzena Moser  |                   |
|    | Visit     |            | M, DO  [address]      |                   |
|        |           |            | [address]      |                   |
|        |           |            | [place]      |                   |
|        |           |            | [address] [phone]  |                   |
|        |           |            |  [phone] (Fax)  |                   |
+--------+-----------+------------+----------------------+-------------------+
 documented as of this encounter
 
 Visit Diagnoses
 Not on filedocumented in this encounter

## 2020-01-08 NOTE — XMS
Encounter Summary
  Created on: 2020
 
 Viviana Ricci
 External Reference #: [id number]
 : 47
 Sex: Female
 
 Demographics
 
 
+-----------------------+----------------------+
| Address               | [address]      |
|                       | [place]  [address] |
+-----------------------+----------------------+
| Home Phone            | [phone]      |
+-----------------------+----------------------+
| Preferred Language    | Unknown              |
+-----------------------+----------------------+
| Marital Status        | Single               |
+-----------------------+----------------------+
| Islam Affiliation | [id number]                 |
+-----------------------+----------------------+
| Race                  | Unknown              |
+-----------------------+----------------------+
| Ethnic Group          | Unknown              |
+-----------------------+----------------------+
 
 
 Author
 
 
+--------------+--------------------------------------------+
| Author       | [facility]  |
|              | and [facility]                                |
+--------------+--------------------------------------------+
| Organization | [facility]  |
|              | and [place]                                |
+--------------+--------------------------------------------+
| Address      | Unknown                                    |
+--------------+--------------------------------------------+
| Phone        | Unavailable                                |
+--------------+--------------------------------------------+
 
 
 
 Support
 
 
+----------------+--------------+---------------------+-----------------+
| Name           | Relationship | Address             | Phone           |
+----------------+--------------+---------------------+-----------------+
| Ada/Ed Radhames | ECON         | [address]              | [phone] |
|                |              | [place]  |                 |
|                |              |  [id number]              |                 |
+----------------+--------------+---------------------+-----------------+
 
 
 
 
 Care Team Providers
 
 
+-----------------------+------+-------------+
| Care Team Member Name | Role | Phone       |
+-----------------------+------+-------------+
 PCP  | Unavailable |
+-----------------------+------+-------------+
 
 
 
 Reason for Visit
 
 
+------------------+----------+
| Reason           | Comments |
+------------------+----------+
| Follow-up        |          |
+------------------+----------+
| Coronary Artery  |          |
| Disease          |          |
+------------------+----------+
| Chest Pain       |          |
+------------------+----------+
 
 
 
 Encounter Details
 
 
+--------+---------+----------------------+----------------------+-------------------+
| Date   | Type    | Department           | Care Team            | Description       |
+--------+---------+----------------------+----------------------+-------------------+
| / | Office  |   [facility]          |   Tonia Wilson,    | Coronary artery   |
|    | Visit   | CARDIOLOGY  [address]    | ARNP  [address]   | disease (Primary  |
|        |         | [address]  [place], | [address]  [place], [place]  | Dx); HTN          |
|        |         |  [place] [address]       | [address]  [phone]  | (hypertension);   |
|        |         | [phone]         |  [phone] (Fax)  | Hyperlipidemia;   |
|        |         |                      |                      | Dyspnea           |
+--------+---------+----------------------+----------------------+-------------------+
 
 
 
 Social History
 
 
+--------------+-------+-----------+--------+------+
| Tobacco Use  | Types | Packs/Day | Years  | Date |
|              |       |           | Used   |      |
+--------------+-------+-----------+--------+------+
| Never Smoker |       |           |        |      |
+--------------+-------+-----------+--------+------+
 
 
 
+---------------------+---+---+---+
| Smokeless Tobacco:  |   |   |   |
| Never Used          |   |   |   |
+---------------------+---+---+---+
 
 
 
 
+---------------------------------------------------------------+
| Comments: some second hand smoke exposure, but fairly minimal |
+---------------------------------------------------------------+
 
 
 
+-------------+-------------+---------+----------+
| Alcohol Use | Drinks/Week | oz/Week | Comments |
+-------------+-------------+---------+----------+
| No          |             |         |          |
+-------------+-------------+---------+----------+
 
 
 
+------------------+---------------+
| Sex Assigned at  | Date Recorded |
| Birth            |               |
+------------------+---------------+
| Not on file      |               |
+------------------+---------------+
 
 
 
+----------------+-------------+-------------+
| Job Start Date | Occupation  | Industry    |
+----------------+-------------+-------------+
| Not on file    | Not on file | Not on file |
+----------------+-------------+-------------+
 
 
 
+----------------+--------------+------------+
| Travel History | Travel Start | Travel End |
+----------------+--------------+------------+
 
 
 
+-------------------------------------+
| No recent travel history available. |
+-------------------------------------+
 documented as of this encounter
 
 Last Filed Vital Signs
 
 
+-------------------+-------------------+----------------------+----------+
| Vital Sign        | Reading           | Time Taken           | Comments |
+-------------------+-------------------+----------------------+----------+
| Blood Pressure    | 126/84            | 2014 12:11 PM  |          |
|                   |                   | PDT                  |          |
+-------------------+-------------------+----------------------+----------+
| Pulse             | 64                | 2014 12:11 PM  | regular  |
|                   |                   | PDT                  |          |
+-------------------+-------------------+----------------------+----------+
| Temperature       | -                 | -                    |          |
+-------------------+-------------------+----------------------+----------+
| Respiratory Rate  | 18                | 2014 12:11 PM  |          |
 
|                   |                   | PDT                  |          |
+-------------------+-------------------+----------------------+----------+
| Oxygen Saturation | -                 | -                    |          |
+-------------------+-------------------+----------------------+----------+
| Inhaled Oxygen    | -                 | -                    |          |
| Concentration     |                   |                      |          |
+-------------------+-------------------+----------------------+----------+
| Weight            | 125.2 kg (276 lb) | 2014 12:11 PM  |          |
|                   |                   | PDT                  |          |
+-------------------+-------------------+----------------------+----------+
| Height            | 167.6 cm (5' 6")  | 2014 12:11 PM  |          |
|                   |                   | PDT                  |          |
+-------------------+-------------------+----------------------+----------+
| Body Mass Index   | 44.55             | 2014 12:11 PM  |          |
|                   |                   | PDT                  |          |
+-------------------+-------------------+----------------------+----------+
 documented in this encounter
 
 Progress Notes
 Tonia Wilson ARNP - 2014 12:14 PM PDTFormatting of this note might be different fr
om the original.
    
 
 PATIENT NAME:  Viviana Ricci  
 : 1945:         AGE: 68 y.o.
  PRIMARY CARE:
 Marzena Moser DO 
 
 OUTPATIENT FOLLOW UP VISIT
 
 Date of Service: 2014
 
 HISTORY OF PRESENT ILLNESS:
 Viviana Ricci is a 68 y.o. female with a history of CAD post CABG x 3 in , history of 
diabetes, renal failure post a renal transplantation, morbid obesity, inactivity, hypertensi
on, hyperlipidemia, pulmonary hypertension and severe arthritis.  She is being seen today fo
r follow up coronary artery disease and risk assessment for knee surgery.
 
 She was last seen 14 at which time she was scheduled for a nuclear medicine stress deb
t for risk stratification of her coronary artery disease for preoperative risk assessment.  
Since that time, she reports she has been feeling about the same.  She has shortness of ermelinda
th with activity such as carrying her groceries up to 3 steps into her house, primarily when
 she is in more pain, and not as much on other days when her pain in her back and knee is be
tter.  She has difficulty with mobility due to her left knee pain, so it is difficult to kael
luate her functional status.  She has not had any symptoms of chest pain at rest or with act
ivity.  She denies any lightheadedness or dizziness or palpitations.  She chronically gets a
 little bit of ankle swelling by the end of the day, and she feels that it is better than it
 used to be on her current dose of furosemide.  She lays flat at night on one pillow with he
r CPAP machine in place, and has no symptoms of shortness of breath with this.
 
 MEDICAL, SURGICAL, AND PERSONAL HISTORY
 Past Medical, Surgical, Family, and Social History are reviewed in EPIC.
 
 CURRENT PROBLEMS
 Patient Active Problem List 
 Diagnosis 
   Chronic low back pain 
   Hypothyroidism 
   Hyperlipidemia 
   Complications of transplanted kidney 
 
   Movement disorder 
   Diabetes mellitus type II, uncontrolled (HCC) 
   Pulmonary hypertension (HCC) 
   Coronary artery disease 
   Unspecified hypertensive kidney disease with chronic kidney disease stage I through sta
ge IV, or unspecified 
   JACK on CPAP 
   Obesity hypoventilation syndrome 
   Kidney replaced by transplant 
   Secondary hyperparathyroidism 
   Dyspnea 
   FSGS (focal segmental glomerulosclerosis) 
   Murmur 
   Cardiomegaly 
   HTN (hypertension) 
   PLMD (periodic limb movement disorder) 
   Chest pain 
 
 CURRENT MEDICATIONS
 Outpatient Encounter Prescriptions as of 2014 
 Medication Sig Dispense Refill 
   allopurinol (ZYLOPRIM) 100 mg tablet Take 1 tablet by mouth Daily.  30 tablet  11 
   aspirin 81 MG EC tablet Take 81 mg by mouth Daily.     
   cholecalciferol (VITAMIN D-3) 2000 UNITS TABS Take 1,000 Units by mouth Three times a w
[place].     
   cinacalcet (SENSIPAR) 30 mg tablet Take 1 tablet by mouth Daily.  30 tablet  12 
   fludrocortisone (FLORINEF) 0.1 mg tablet Take 1 tablet by mouth Every other day.  45 ta
blet  2 
   fluticasone (FLOVENT HFA) 220 mcg/puff inhaler Inhale 1 puff into the lungs 2 times shante
ly. Rinse mouth after use.  1 Inhaler  11 
   furosemide (LASIX) 40 mg tablet Take 1 tablet by mouth Daily.  30 tablet  5 
   glucose blood VI test strips (ONE TOUCH ULTRA TEST) strip Check blood sugar before each
 meal and as directed  100 each  12 
   insulin glargine (LANTUS) 100 units/mL injection Inject 20 Units under the skin every m
orning.  10 mL  prn 
   insulin lispro (HUMALOG) 100 units/mL injection Inject subcutaneously before meals acco
rding to sliding scale  10 pen  5 
   Insulin Syringe-Needle U-100 (BD INSULIN SYRINGE ULTRAFINE) 31G X 5/16" 0.5 ML MISC Use
 before meals and as directed.  100 each  11 
   levothyroxine (LEVOTHROID) 50 mcg tablet Take 1 tablet by mouth Daily.  30 tablet  11 
   lisinopril (PRINIVIL,ZESTRIL) 30 MG tablet Take 1 tablet by mouth Daily.  90 tablet  3 
   loperamide (ANTI-DIARRHEAL) 2 mg capsule Take 2 mg by mouth Daily as needed.     
   magnesium oxide (MAG-OX) 400 mg tablet Take 1 tablet by mouth 2 times daily.  62 tablet
  12 
   metoprolol tartrate (LOPRESSOR) 25 mg tablet Take 1 tablet by mouth 2 times daily.  60 
tablet  11 
   mycophenolate (CELLCEPT) 250 mg capsule Take 250 mg by mouth 3 times daily.     
   omeprazole (PRILOSEC) 20 mg capsule Take one capsule by mouth once daily on an empty st
omach  90 capsule  3 
   predniSONE (DELTASONE) 5 mg tablet Take 1 tablet by mouth Daily.  90 tablet  3 
   Prenatal Multivit-Min-Fe-FA (PRENATAL VITAMINS) 0.8 MG TABS Take 0.8 mg by mouth Daily.
  30 each  11 
   Prenatal Vit-Fe Fumarate-FA (PNV PRENATAL PLUS MULTIVITAMIN) 27-1 MG TABS      
   Respiratory Therapy Supplies MISC Decrease CPAP to 12-18 cmH2O Diagnosis Code(s)327.23.
 Please send order to [facility].  1 each  0 
   rosuvastatin (CRESTOR) 20 mg tablet Take 1 tablet by mouth nightly.  30 tablet  11 
   tacrolimus (PROGRAF) 0.5 mg capsule TAD.     
   tacrolimus (PROGRAF) 1 mg capsule Take 1.5 mg by mouth 2 times daily.     
   valsartan (DIOVAN) 160 mg tablet Take 1 tablet by mouth Daily.  30 tablet  11 
  
 
 
 ALLERGIES
 No Known Allergies
 
 ROS
 Review of Systems 
 Constitutional: Positive for malaise/fatigue. 
 Respiratory: Positive for shortness of breath.  
 Cardiovascular: Negative for chest pain, palpitations, orthopnea, leg swelling and PND. 
 Gastrointestinal: Negative for abdominal pain. 
 Musculoskeletal: Positive for back pain and joint pain. 
 Neurological: Negative for dizziness and loss of consciousness. 
 
 OBJECTIVE:  
 
 PHYSICAL EXAM
 /84 | Pulse 64 | Resp 18 | Ht 1.676 m (5' 6") | Wt 125.193 kg (276 lb) | BMI 44.57 kg
/m2
 Physical Exam 
 Constitutional: She is oriented to person, place, and time. She appears well-developed and 
well-nourished. 
      Adult female in no acute distress 
 Neck: Normal carotid pulses and no JVD (Difficult to fully evaluate due to body habitus) pr
esent. Carotid bruit is not present. 
 Cardiovascular: Normal rate, regular rhythm, S1 normal, S2 normal and intact distal pulses.
  PMI is not displaced.  Exam reveals no gallop and no friction rub.  
 Murmur heard.
  Holosystolic murmur is present with a grade of 1/6 
 Pulses:
      Carotid pulses are 2+ on the right side, and 2+ on the left side.
      Posterior tibial pulses are 1+ on the right side, and 1+ on the left side. 
 Pulmonary/Chest: Effort normal and breath sounds normal. No accessory muscle usage. No resp
iratory distress. She has no wheezes. She has no rhonchi. She has no rales. 
 Abdominal: Soft. Normal appearance and normal aorta. She exhibits no abdominal bruit. There
 is no hepatosplenomegaly. There is no tenderness. 
      Obese 
 Musculoskeletal: She exhibits edema (trace bilaterally at ankles, some venous stasis skin c
hanges noted below knees). 
 Neurological: She is alert and oriented to person, place, and time. Gait (using cane for am
bulation) abnormal. 
 Skin: Skin is warm and dry. No cyanosis. Nails show no clubbing. 
 Psychiatric: She has a normal mood and affect. Her mood appears not anxious. She does not e
xhibit a depressed mood. 
 
 ECG: I personally reviewed EKG tracing from 14: Sinus rhythm with first degree AV bloc
k, rate of 60 beats per minute.  See scanned document for further interpretation. 
 
 LAB RESULTS: 
 LIPID
 Lab Results 
 Component Value Date 
  CHOL 162 2013 
  TRIG 225 2013 
  HDL 45 2013 
  LDL 72 2013 
  LDLEX 65 3/12/2014 
  HDLEX 50.2 3/12/2014 
  TRIGEX 197 3/12/2014 
  CHOLEX 155 3/12/2014 
 
 
 CHEMISTRY
 Lab Results 
 Component Value Date 
   3/12/2014 
   3/12/2014 
  K 5.0 3/12/2014 
   3/12/2014 
  CO2 21 3/12/2014 
  CALCIUM 10.2 3/12/2014 
  ALKPHOS 102 3/12/2014 
  AST 20 7/10/2013 
  ASTEX 23 3/12/2014 
  ALT 14 7/10/2013 
  ALTEX 16 3/12/2014 
  BILITOT 0.5 3/12/2014 
  CREA 1.7 7/10/2013 
  BUN 44 3/12/2014 
  EGFR 31.0 7/10/2013 
  EGFREX 37 3/12/2014 
  CREEX 1.43 3/12/2014 
 
 HEMATOLOGY
 Lab Results 
 Component Value Date 
  WBC 4.6 7/10/2013 
  HGB 11.9 7/10/2013 
  HCT 35.7 7/10/2013 
  * 7/10/2013 
  HGBEX 13.3 3/12/2014 
 
 I reviewed records from Dr. Boykin for office visit on 14.
 
 Nuclear stress test 14 Persantine EKG is negative. Normal Persantine Sestamibi myocar
dial perfusion study with a normal left ventricular size and wall thickness. Preserved left 
ventricular systolic function. LVEF by gated SPECT 78%.
 
 ASSESSMENT:  
 
 1. Coronary artery disease pre-operative clearance prior to the knee surgery
 A.  Status post CABG x 3 in  with LIMA to the LAD, SVG to the OM1 and OM2.
             B.  A persantine sestamibi stress test on 07 revealed a medium sized, mod
erate in severity fixed defect of the anterior wall, and a small sized mild in severity fixe
d defect of the inferior wall, LVEF by gated SPECT was 66%.
  C.  Bilateral heart catheterization on 05/03/10, shows severe two vessel coronary artery d
isease, a chronic occlusion through the proximal portion of the left anterior descending art
adele and a chronic occlusion through the proximal portion of the left circumflex artery, rani
ts: open left internal mammary artery graft to the mid left anterior descending artery, open
 saphenous vein grafts to the OM1 and OM2, mildly dilated left ventricular cavity with a nor
mal left systolic function, LVEF 70%, mild to moderate pulmonary hypertension and a normal c
ardiac output, coronary circulation is right dominant, normal system pressure.
  D.  Persantine nuclear medicine stress test 14 shows a normal myocardial perfusion im
aging study with normal left ventricular size, wall thickness, LVEF by gated SPECT of 78%.  
  E.  Today she denies any chest pain Because of the severe knee and back pain, her physical
 activity is very limited.  She ambulates very little using a walker. In the past 3 weeks, s
he has used a stationary bicycle up to 5 minutes without chest pain but admits having some t
rouble breathing.  There is no signs and symptoms of overt congestive heart failure.  She is
 in a class II of [place] Heart Association functional class.  There is no fluid retention 
on physical examination.  Her stress test is a low risk study.  She remains on a beta blocke
r.
 2. Severe arthritis
 
  A. Patient state that she is suffering from a knee pain secondary to a "knock kneed".  She
 is being seen by the orthopedic surgeon for a possible knee surgery/correction.   
 3. Right sided heart failure/ cor pulmonale / severe pulmonary hypertension:
             A. The echocardiogram from 02/19/10 revealed moderate bi-atrial dilatation and 
normal left ventricular size, wall thickness and motion, LVEF is 55-60%, dilated right ventr
icle with moderate hypo-kinesis, moderate tricuspid valve regurgitation, severe pulmonary hy
pertension with a peak systolic pressure of 80 mmHg, and a small pericardial effusion. 
  B. Echocardiogram from 4/15/14 showed Mild left atrial dilatation. Normal left ventricular
 size, wall thickness and motion. Preserved  left ventricular systolic function. LVEF is 70-
75%. Grade 1 left ventricular diastole dysfunction. Mild tricuspid valve regurgitation. Mild
 thickened trileaflet aortic valve with adequate opening. A mild aortic valve insufficiency.
 Normal right-sided pressure.
 4. Hypertension:
  A.  Today her blood pressure is well-controlled.
 5. Obstructive sleep apnea:
  A.  She uses a CPAP machine at night with 2 L of oxygen bled in.
 6.  Morbid obesity.
 7. Type II diabetes.
 8. Chronic kidney disease:
  A.  Renal Allograft, FSGS in renal allograft--clinically stable. Followed by Dr. Moser.
 
 PLAN:  
 
 1. In discussion with Dr. Townsend, the patient is assessed to carry a low to moderate ris
k for perioperative cardiac event with left knee replacement.  She does not require any furt
her medication changes or cardiac testing, and may proceed with scheduling surgery.
 2. She will otherwise continue with her current medical regimen.
 3. She will followup in 6 months, or sooner with concerns.
 
 I, BELKIS Arredondo, saw this patient under the direct supervision of Popeye Townsend MD
 
 Electronically signed by: 
 BELKIS Arredondo on 2014 at 12:14
 
 Portions of this chart may have been created with Dragon voice recognition software. Occasi
onal wrong-word or   sound-alike  substitutions may have occurred due to the inherent naqvi
itations of voice recognition software. Please read the chart carefully and recognize, using
 context, where these substitutions have occurred.
 Electronically signed by BELKIS Arredondo at 2014  1:05 PM PDTdocumented in this e
ncounter
 
 Plan of Treatment
 
 
+--------+-----------+------------+----------------------+-------------------+
| Date   | Type      | Specialty  | Care Team            | Description       |
+--------+-----------+------------+----------------------+-------------------+
| / | Office    | Cardiology |   Tonia Wilson,    |                   |
|    | Visit     |            | ARNP  [address]   |                   |
|        |           |            | St  [place], [place]  |                   |
|        |           |            | [address]  [phone]  |                   |
|        |           |            |  [phone] (Fax)  |                   |
+--------+-----------+------------+----------------------+-------------------+
| / | Hospital  | Radiology  |   Popeye Townsend, |                   |
|    | Encounter |            |  MD  [address] |                   |
|        |           |            |  [place],   |                   |
|        |           |            | [place] [address]             |                   |
|        |           |            | [phone]         |                   |
|        |           |            | [phone] (Fax)   |                   |
 
+--------+-----------+------------+----------------------+-------------------+
| / | Surgery   | Radiology  |   Popeye Townsend, | CV EP PPM SYSTEM  |
|    |           |            |  MD  [address] | IMPLANT           |
|        |           |            |  St.  [place],   |                   |
|        |           |            | [place] [address]             |                   |
|        |           |            | [phone]         |                   |
|        |           |            | [phone] (Fax)   |                   |
+--------+-----------+------------+----------------------+-------------------+
| 02/10/ | Clinical  | Cardiology |                      |                   |
|    | Support   |            |                      |                   |
+--------+-----------+------------+----------------------+-------------------+
| / | Office    | Cardiology |   Tonia Wilson,    |                   |
|    | Visit     |            | [facility]  [address]   |                   |
|        |           |            | [place]  |                   |
|        |           |            | [address]  [phone]  |                   |
|        |           |            |  [phone] (Fax)  |                   |
+--------+-----------+------------+----------------------+-------------------+
| / | Off-Site  | Nephrology |   Marzena Moser  |                   |
|    | Visit     |            | DO [facility]  [address] [place] [address]      |                   |
|        |           |            | [address]      |                   |
|        |           |            | [place]      |                   |
|        |           |            | [address]  [phone]  |                   |
|        |           |            |  [phone] (Fax)  |                   |
+--------+-----------+------------+----------------------+-------------------+
 documented as of this encounter
 
 Procedures
 
 
+-------------------+--------+-------------+----------------------+----------------------+
| Procedure Name    | Priori | Date/Time   | Associated Diagnosis | Comments             |
|                   | ty     |             |                      |                      |
+-------------------+--------+-------------+----------------------+----------------------+
| ECG 12 LEAD  - PB | Routin | 2014  |   Coronary artery    |   Results for this   |
|                   | e      |  1:06 PM    | disease              | procedure are in the |
|                   |        | PDT         |                      |  results section.    |
+-------------------+--------+-------------+----------------------+----------------------+
 documented in this encounter
 
 Results
 ECG 12 lead (2014  1:06 PM PDT)
 
+---------------------------------------------------------------------+--------------+
| Narrative                                                           | Performed At |
+---------------------------------------------------------------------+--------------+
|   [facility]       2014 13:06  14: Sinus rhythm  |              |
| with first degree AV block, rate of 60   beats per minute.   See    |              |
| scanned ECG for further interpretation by   Dr. Townsend.          |              |
+---------------------------------------------------------------------+--------------+
 
 
 
+-------------------------------------------------------------------------------------+
| Procedure Note                                                                      |
+-------------------------------------------------------------------------------------+
|   Tonia Wilson ARNP - 2014  1:06 PM PDT  14: Sinus rhythm with first  |
| degree AV block, rate of 60 beats per minute.  See scanned ECG for further          |
| interpretation by Dr. Townsend.                                                    |
+-------------------------------------------------------------------------------------+
 documented in this encounter
 
 
 Visit Diagnoses
 
 
+---------------------------------------------------------------------------------------+
| Diagnosis                                                                             |
+---------------------------------------------------------------------------------------+
|   Coronary artery disease - Primary  Coronary atherosclerosis of unspecified type of  |
| vessel, native or graft                                                               |
+---------------------------------------------------------------------------------------+
|   HTN (hypertension)  Unspecified essential hypertension                              |
+---------------------------------------------------------------------------------------+
|   Hyperlipidemia  Other and unspecified hyperlipidemia                                |
+---------------------------------------------------------------------------------------+
|   Dyspnea  Other dyspnea and respiratory abnormality                                  |
+---------------------------------------------------------------------------------------+
 documented in this encounter

## 2020-01-08 NOTE — XMS
Encounter Summary
  Created on: 2020
 
 Viviana Ricci
 External Reference #: [id number]
 : 47
 Sex: Female
 
 Demographics
 
 
+-----------------------+----------------------+
| Address               | [address]      |
|                       | [place]  [address] |
+-----------------------+----------------------+
| Home Phone            | [phone]      |
+-----------------------+----------------------+
| Preferred Language    | Unknown              |
+-----------------------+----------------------+
| Marital Status        | Single               |
+-----------------------+----------------------+
| Episcopalian Affiliation | [id number]                 |
+-----------------------+----------------------+
| Race                  | Unknown              |
+-----------------------+----------------------+
| Ethnic Group          | Unknown              |
+-----------------------+----------------------+
 
 
 Author
 
 
+--------------+--------------------------------------------+
| Author       | [facility]  |
|              | and [facility]                                |
+--------------+--------------------------------------------+
| Organization | [facility]  |
|              | and [facility]                                |
+--------------+--------------------------------------------+
| Address      | Unknown                                    |
+--------------+--------------------------------------------+
| Phone        | Unavailable                                |
+--------------+--------------------------------------------+
 
 
 
 Support
 
 
+----------------+--------------+---------------------+-----------------+
| Name           | Relationship | Address             | Phone           |
+----------------+--------------+---------------------+-----------------+
| Ada/Ed Radhames | ECON         | [address]              | [phone] |
|                |              | [place]  |                 |
|                |              |  [address]              |                 |
+----------------+--------------+---------------------+-----------------+
 
 
 
 
 Care Team Providers
 
 
+-----------------------+------+-------------+
| Care Team Member Name | Role | Phone       |
+-----------------------+------+-------------+
 PCP  | Unavailable |
+-----------------------+------+-------------+
 
 
 
 Encounter Details
 
 
+--------+-------------+---------------------+----------------------+---------------------+
| Date   | Type        | Department          | Care Team            | Description         |
+--------+-------------+---------------------+----------------------+---------------------+
| / | Orders Only |   [facility]         |   Marzena Moser  | UTI (lower urinary  |
|    |             | NEPHROLOGY  [address]   | M, DO  [address]      | tract infection)    |
|        |             | [address]   | [place]      | (Primary Dx)        |
|        |             | [place], [place]     | [place], [place]      |                     |
|        |             | [address]          | [address]  [phone]  |                     |
|        |             | [phone]        |  [phone] (Fax)  |                     |
+--------+-------------+---------------------+----------------------+---------------------+
 
 
 
 Social History
 
 
+--------------+-------+-----------+--------+------+
| Tobacco Use  | Types | Packs/Day | Years  | Date |
|              |       |           | Used   |      |
+--------------+-------+-----------+--------+------+
| Never Smoker |       |           |        |      |
+--------------+-------+-----------+--------+------+
 
 
 
+---------------------+---+---+---+
| Smokeless Tobacco:  |   |   |   |
| Never Used          |   |   |   |
+---------------------+---+---+---+
 
 
 
+---------------------------------------------------------------+
| Comments: some second hand smoke exposure, but fairly minimal |
+---------------------------------------------------------------+
 
 
 
+-------------+-------------+---------+----------+
| Alcohol Use | Drinks/Week | oz/Week | Comments |
+-------------+-------------+---------+----------+
| No          |             |         |          |
+-------------+-------------+---------+----------+
 
 
 
 
+------------------+---------------+
| Sex Assigned at  | Date Recorded |
| Birth            |               |
+------------------+---------------+
| Not on file      |               |
+------------------+---------------+
 
 
 
+----------------+-------------+-------------+
| Job Start Date | Occupation  | Industry    |
+----------------+-------------+-------------+
| Not on file    | Not on file | Not on file |
+----------------+-------------+-------------+
 
 
 
+----------------+--------------+------------+
| Travel History | Travel Start | Travel End |
+----------------+--------------+------------+
 
 
 
+-------------------------------------+
| No recent travel history available. |
+-------------------------------------+
 documented as of this encounter
 
 [facility] Bobbi Petit RN - 2015  1:37 PM PDTPer Dr. Moser's verbal order, patient was 
order cephalexin 500 mg PO BID x 7 days. Patient was called with dosing instructions, she ve
rbally expressed a clear understanding. Electronically signed by Bobbi Hanson RN at   1:38 PM PDTdocumented in this encounter
 
 Plan of Treatment
 
 
+--------+-----------+------------+----------------------+-------------------+
| Date   | Type      | Specialty  | Care Team            | Description       |
+--------+-----------+------------+----------------------+-------------------+
| / | Office    | Cardiology |   Arlen Wilsona,    |                   |
|    | Visit     |            | [address]   |                   |
|        |           |            | [place], [place]  |                   |
|        |           |            | [address]  [phone]  |                   |
|        |           |            |  [phone] (Fax)  |                   |
+--------+-----------+------------+----------------------+-------------------+
| / | Hospital  | Radiology  |   Popeye Townsend, |                   |
|    | Encounter |            |  MD  [address] [place] |                   |
|        |           |            |  [place],   |                   |
|        |           |            | [place] [address]             |                   |
|        |           |            | [phone]         |                   |
|        |           |            | [phone] (Fax)   |                   |
+--------+-----------+------------+----------------------+-------------------+
| / | Surgery   | Radiology  |   Popeye Townsend, | CV EP PPM SYSTEM  |
|    |           |            |  MD  [address] | IMPLANT           |
|        |           |            |  St.  [place],   |                   |
|        |           |            | [place] [address]             |                   |
|        |           |            | [phone]         |                   |
|        |           |            | [phone] (Fax)   |                   |
 
+--------+-----------+------------+----------------------+-------------------+
| 02/10/ | Clinical  | Cardiology |                      |                   |
|    | Support   |            |                      |                   |
+--------+-----------+------------+----------------------+-------------------+
| / | Office    | Cardiology |   RakeshmaxxalfredoTonia,    |                   |
|    | Visit     |            | BELKIS  [address]   |                   |
|        |           |            | [place]  |                   |
|        |           |            | [id number] [phone]  |                   |
|        |           |            |  [phone] (Fax)  |                   |
+--------+-----------+------------+----------------------+-------------------+
| / | Off-Site  | Nephrology |   Marzena Moser  |                   |
|    | Visit     |            | DO ETIENNE  [address] [place] [address]      |                   |
|        |           |            | [address]      |                   |
|        |           |            | [place]      |                   |
|        |           |            | [address]  [phone]  |                   |
|        |           |            |  [phone] (Fax)  |                   |
+--------+-----------+------------+----------------------+-------------------+
 documented as of this encounter
 
 Visit Diagnoses
 
 
+-------------------------------------------------------------------------------------+
| Diagnosis                                                                           |
+-------------------------------------------------------------------------------------+
|   UTI (lower urinary tract infection) - Primary  Urinary tract infection, site not  |
| specified                                                                           |
+-------------------------------------------------------------------------------------+
 documented in this encounter

## 2020-01-08 NOTE — XMS
Encounter Summary
  Created on: 2020
 
 Viviana Ricci
 External Reference #: [id number]
 : 47
 Sex: Female
 
 Demographics
 
 
+-----------------------+----------------------+
| Address               | [address]      |
|                       | [place]  [address] |
+-----------------------+----------------------+
| Home Phone            | [phone]      |
+-----------------------+----------------------+
| Preferred Language    | Unknown              |
+-----------------------+----------------------+
| Marital Status        | Single               |
+-----------------------+----------------------+
| Congregational Affiliation | 1009                 |
+-----------------------+----------------------+
| Race                  | Unknown              |
+-----------------------+----------------------+
| Ethnic Group          | Unknown              |
+-----------------------+----------------------+
 
 
 Author
 
 
+--------------+--------------------------------------------+
| Author       | [facility]  |
|              | and [facility]                                |
+--------------+--------------------------------------------+
| Organization | [facility]  |
|              | and [facility]                                |
+--------------+--------------------------------------------+
| Address      | Unknown                                    |
+--------------+--------------------------------------------+
| Phone        | Unavailable                                |
+--------------+--------------------------------------------+
 
 
 
 Support
 
 
+----------------+--------------+---------------------+-----------------+
| Name           | Relationship | Address             | Phone           |
+----------------+--------------+---------------------+-----------------+
| Ada/Ed Radhames | ECON         | [address]              | [phone] |
|                |              | [place]  |                 |
|                |              |  [address]              |                 |
+----------------+--------------+---------------------+-----------------+
 
 
 
 
 Care Team Providers
 
 
+-----------------------+------+-------------+
| Care Team Member Name | Role | Phone       |
+-----------------------+------+-------------+
 PCP  | Unavailable |
+-----------------------+------+-------------+
 
 
 
 Reason for Visit
 
 
+-------------------+----------+
| Reason            | Comments |
+-------------------+----------+
| Medication Refill |          |
+-------------------+----------+
 
 
 
 Encounter Details
 
 
+--------+--------+---------------------+----------------------+-------------------+
| Date   | Type   | Department          | Care Team            | Description       |
+--------+--------+---------------------+----------------------+-------------------+
| / | Refill |   [facility]         |   Marzena Moser  | Medication Refill |
| 2018   |        | NEPHROLOGY  [address]   | M, DO  [address]      |                   |
|        |        | [address]   | [address]      |                   |
|        |        | [place], [place]     | [place], [place]      |                   |
|        |        | [address]          | [address]  [phone]  |                   |
|        |        | [phone]        |  [phone] (Fax)  |                   |
+--------+--------+---------------------+----------------------+-------------------+
 
 
 
 Social History
 
 
+--------------+-------+-----------+--------+------+
| Tobacco Use  | Types | Packs/Day | Years  | Date |
|              |       |           | Used   |      |
+--------------+-------+-----------+--------+------+
| Never Smoker |       |           |        |      |
+--------------+-------+-----------+--------+------+
 
 
 
+---------------------+---+---+---+
| Smokeless Tobacco:  |   |   |   |
| Never Used          |   |   |   |
+---------------------+---+---+---+
 
 
 
+---------------------------------------------------------------+
| Comments: some second hand smoke exposure, but fairly minimal |
 
+---------------------------------------------------------------+
 
 
 
+-------------+-------------+---------+----------+
| Alcohol Use | Drinks/Week | oz/Week | Comments |
+-------------+-------------+---------+----------+
| No          |             |         |          |
+-------------+-------------+---------+----------+
 
 
 
+------------------+---------------+
| Sex Assigned at  | Date Recorded |
| Birth            |               |
+------------------+---------------+
| Not on file      |               |
+------------------+---------------+
 
 
 
+----------------+-------------+-------------+
| Job Start Date | Occupation  | Industry    |
+----------------+-------------+-------------+
| Not on file    | Not on file | Not on file |
+----------------+-------------+-------------+
 
 
 
+----------------+--------------+------------+
| Travel History | Travel Start | Travel End |
+----------------+--------------+------------+
 
 
 
+-------------------------------------+
| No recent travel history available. |
+-------------------------------------+
 documented as of this encounter
 
 Plan of Treatment
 
 
+--------+-----------+------------+----------------------+-------------------+
| Date   | Type      | Specialty  | Care Team            | Description       |
+--------+-----------+------------+----------------------+-------------------+
| / | Office    | Cardiology |   HellalfredoTonia,    |                   |
|    | Visit     |            | ARNP  [address]   |                   |
|        |           |            | St  [place], [place]  |                   |
|        |           |            | [address]  [phone]  |                   |
|        |           |            |  [phone] (Fax)  |                   |
+--------+-----------+------------+----------------------+-------------------+
| / | Hospital  | Radiology  |   Popeye Townsend, |                   |
|    | Encounter |            |  MD  [address] |                   |
|        |           |            |  St.  [place],   |                   |
|        |           |            | [place] [address]             |                   |
|        |           |            | [phone]         |                   |
|        |           |            | [phone] (Fax)   |                   |
+--------+-----------+------------+----------------------+-------------------+
| / | Surgery   | Radiology  |   Popeye Townsend, | CV EP PPM SYSTEM  |
 
|    |           |            |  MD  [address] | IMPLANT           |
|        |           |            |  St.  [place],   |                   |
|        |           |            | [place] [address]             |                   |
|        |           |            | [phone]         |                   |
|        |           |            | [phone] (Fax)   |                   |
+--------+-----------+------------+----------------------+-------------------+
| 02/10/ | Clinical  | Cardiology |                      |                   |
|    | Support   |            |                      |                   |
+--------+-----------+------------+----------------------+-------------------+
| / | Office    | Cardiology |   Tonia Wilson,    |                   |
|    | Visit     |            | ARNP  [address]   |                   |
|        |           |            | [place]  |                   |
|        |           |            | [address]  [phone]  |                   |
|        |           |            |  [phone] (Fax)  |                   |
+--------+-----------+------------+----------------------+-------------------+
| / | Off-Site  | Nephrology |   Marzena Moser  |                   |
|    | Visit     |            | DO ETIENNE  [address] [place] [address]      |                   |
|        |           |            | [address]      |                   |
|        |           |            | [place]      |                   |
|        |           |            | [address]  [phone]  |                   |
|        |           |            |  [phone] (Fax)  |                   |
+--------+-----------+------------+----------------------+-------------------+
 documented as of this encounter
 
 Visit Diagnoses
 Not on filedocumented in this encounter

## 2020-01-08 NOTE — XMS
Encounter Summary
  Created on: 2020
 
 Viviana Ricci
 External Reference #: [id number]
 : 47
 Sex: Female
 
 Demographics
 
 
+-----------------------+----------------------+
| Address               | [address]      |
|                       | [place]  [address] |
+-----------------------+----------------------+
| Home Phone            | [phone]      |
+-----------------------+----------------------+
| Preferred Language    | Unknown              |
+-----------------------+----------------------+
| Marital Status        | Single               |
+-----------------------+----------------------+
| [employer] Affiliation | 1009                 |
+-----------------------+----------------------+
| Race                  | Unknown              |
+-----------------------+----------------------+
| Ethnic Group          | Unknown              |
+-----------------------+----------------------+
 
 
 Author
 
 
+--------------+--------------------------------------------+
| Author       | [facility]  |
|              | and [facility]                                |
+--------------+--------------------------------------------+
| Organization | [facility]  |
|              | and [facility]                                |
+--------------+--------------------------------------------+
| Address      | Unknown                                    |
+--------------+--------------------------------------------+
| Phone        | Unavailable                                |
+--------------+--------------------------------------------+
 
 
 
 Support
 
 
+----------------+--------------+---------------------+-----------------+
| Name           | Relationship | Address             | Phone           |
+----------------+--------------+---------------------+-----------------+
| Ada/Ed Radhames | ECON         | [address]              | [phone] |
|                |              | [place]  |                 |
|                |              |  [address]              |                 |
+----------------+--------------+---------------------+-----------------+
 
 
 
 
 Care Team Providers
 
 
+-----------------------+------+-------------+
| Care Team Member Name | Role | Phone       |
+-----------------------+------+-------------+
 PCP  | Unavailable |
+-----------------------+------+-------------+
 
 
 
 Encounter Details
 
 
+--------+----------+---------------------+----------------------+-------------+
| Date   | Type     | Department          | Care Team            | Description |
+--------+----------+---------------------+----------------------+-------------+
| / | Abstract |   [facility] [place]         |   Marzena Moser  |             |
|    |          | NEPHROLOGY  [address]   | M, DO  [address]      |             |
|        |          | [address]   | [address]      |             |
|        |          | [place], [place]     | [place]      |             |
|        |          | [address]          | [address]  [phone]  |             |
|        |          | [phone]        |  [phone] (Fax)  |             |
+--------+----------+---------------------+----------------------+-------------+
 
 
 
 Social History
 
 
+--------------+-------+-----------+--------+------+
| Tobacco Use  | Types | Packs/Day | Years  | Date |
|              |       |           | Used   |      |
+--------------+-------+-----------+--------+------+
| Never Smoker |       |           |        |      |
+--------------+-------+-----------+--------+------+
 
 
 
+---------------------+---+---+---+
| Smokeless Tobacco:  |   |   |   |
| Never Used          |   |   |   |
+---------------------+---+---+---+
 
 
 
+---------------------------------------------------------------+
| Comments: some second hand smoke exposure, but fairly minimal |
+---------------------------------------------------------------+
 
 
 
+-------------+-------------+---------+----------+
| Alcohol Use | Drinks/Week | oz/Week | Comments |
+-------------+-------------+---------+----------+
| No          |             |         |          |
+-------------+-------------+---------+----------+
 
 
 
 
+------------------+---------------+
| Sex Assigned at  | Date Recorded |
| Birth            |               |
+------------------+---------------+
| Not on file      |               |
+------------------+---------------+
 
 
 
+----------------+-------------+-------------+
| Job Start Date | Occupation  | Industry    |
+----------------+-------------+-------------+
| Not on file    | Not on file | Not on file |
+----------------+-------------+-------------+
 
 
 
+----------------+--------------+------------+
| Travel History | Travel Start | Travel End |
+----------------+--------------+------------+
 
 
 
+-------------------------------------+
| No recent travel history available. |
+-------------------------------------+
 documented as of this encounter
 
 Plan of Treatment
 
 
+--------+-----------+------------+----------------------+-------------------+
| Date   | Type      | Specialty  | Care Team            | Description       |
+--------+-----------+------------+----------------------+-------------------+
| / | Office    | Cardiology |   Tonia Wilson,    |                   |
|    | Visit     |            | ARNJESSICA  [address]   |                   |
|        |           |            | [place]  |                   |
|        |           |            | [address]  [phone]  |                   |
|        |           |            |  [phone] (Fax)  |                   |
+--------+-----------+------------+----------------------+-------------------+
| / | Hospital  | Radiology  |   Popeye Townsend, |                   |
|    | Encounter |            |  MD  [address] |                   |
|        |           |            |  St.  [place],   |                   |
|        |           |            | [place] [address]             |                   |
|        |           |            | [phone]         |                   |
|        |           |            | [phone] (Fax)   |                   |
+--------+-----------+------------+----------------------+-------------------+
| / | Surgery   | Radiology  |   Popeye Townsend, | CV EP PPM SYSTEM  |
|    |           |            |  MD  [address] | IMPLANT           |
|        |           |            |  St.  [place],   |                   |
|        |           |            | [place] [address]             |                   |
|        |           |            | [phone]         |                   |
|        |           |            | [phone] (Fax)   |                   |
+--------+-----------+------------+----------------------+-------------------+
| 02/10/ | Clinical  | Cardiology |                      |                   |
|    | Support   |            |                      |                   |
+--------+-----------+------------+----------------------+-------------------+
| / | Office    | Cardiology |   Tonia Wilson,    |                   |
|    | Visit     |            | ARNP  [address]   |                   |
 
|        |           |            | St  [place], [place]  |                   |
|        |           |            | [address]  [phone]  |                   |
|        |           |            |  [phone] (Fax)  |                   |
+--------+-----------+------------+----------------------+-------------------+
| / | Off-Site  | Nephrology |   Marzena Moser  |                   |
|    | Visit     |            | M,   [address] [place] [address]      |                   |
|        |           |            | [address]      |                   |
|        |           |            | [place]      |                   |
|        |           |            | [address]  [phone]  |                   |
|        |           |            |  [phone] (Fax)  |                   |
+--------+-----------+------------+----------------------+-------------------+
 documented as of this encounter
 
 Procedures
 
 
+----------------------+--------+------------+----------------------+----------------------+
| Procedure Name       | Priori | Date/Time  | Associated Diagnosis | Comments             |
|                      | ty     |            |                      |                      |
+----------------------+--------+------------+----------------------+----------------------+
| URINALYSIS, CULTURE  | Routin | 2015 |                      |   Results for this   |
| REFLEX               | e      |            |                      | procedure are in the |
|                      |        |            |                      |  results section.    |
+----------------------+--------+------------+----------------------+----------------------+
 documented in this encounter
 
 Results
 Urinalysis, Culture Reflex (2015)
 
+-----------+-----------------------+-----------+------------+--------------+
| Component | Value                 | Ref Range | Performed  | Pathologist  |
|           |                       |           | At         | Signature    |
+-----------+-----------------------+-----------+------------+--------------+
| Culture   | Comment: >100,000 e.  |           |            |              |
| Indicated | coli                  |           |            |              |
+-----------+-----------------------+-----------+------------+--------------+
 
 
 
+-----------------+
| Specimen        |
+-----------------+
| Urine specimen  |
| (specimen)      |
+-----------------+
 
 
 
+------------------+----------------+--------+----------------+
| Organism         | Antibiotic     | Method | Susceptibility |
+------------------+----------------+--------+----------------+
| Escherichia coli | Cephalexin     |        |   Sensitive    |
+------------------+----------------+--------+----------------+
| Escherichia coli | Ciprofloxacin  |        |   Sensitive    |
+------------------+----------------+--------+----------------+
| Escherichia coli | Amoxicillin +  |        |   Sensitive    |
|                  | Clavulanate    |        |                |
+------------------+----------------+--------+----------------+
| Escherichia coli | Nitrofurantoin |        |   Sensitive    |
+------------------+----------------+--------+----------------+
 
| Escherichia coli | Levofloxacin   |        |   Sensitive    |
+------------------+----------------+--------+----------------+
| Escherichia coli | Trimethoprim   |        |   Sensitive    |
+------------------+----------------+--------+----------------+
| Escherichia coli | Tetracycline   |        |   Sensitive    |
+------------------+----------------+--------+----------------+
 documented in this encounter
 
 Visit Diagnoses
 Not on filedocumented in this encounter

## 2020-01-08 NOTE — XMS
Encounter Summary
  Created on: 2020
 
 Viviana Ricci
 External Reference #: [id number]
 : 47
 Sex: Female
 
 Demographics
 
 
+-----------------------+----------------------+
| Address               | [address]      |
|                       | [place]  [address] |
+-----------------------+----------------------+
| Home Phone            | [phone]      |
+-----------------------+----------------------+
| Preferred Language    | Unknown              |
+-----------------------+----------------------+
| Marital Status        | Single               |
+-----------------------+----------------------+
| Taoism Affiliation | 1009                 |
+-----------------------+----------------------+
| Race                  | Unknown              |
+-----------------------+----------------------+
| Ethnic Group          | Unknown              |
+-----------------------+----------------------+
 
 
 Author
 
 
+--------------+--------------------------------------------+
| Author       | [facility]  |
|              | and [facility]                                |
+--------------+--------------------------------------------+
| Organization | [facility]  |
|              | and [facility]                                |
+--------------+--------------------------------------------+
| Address      | Unknown                                    |
+--------------+--------------------------------------------+
| Phone        | Unavailable                                |
+--------------+--------------------------------------------+
 
 
 
 Support
 
 
+----------------+--------------+---------------------+-----------------+
| Name           | Relationship | Address             | Phone           |
+----------------+--------------+---------------------+-----------------+
| Ada/Ed Radhames | ECON         | [address]              | [phone] |
|                |              | [place]  |                 |
|                |              |  [address]              |                 |
+----------------+--------------+---------------------+-----------------+
 
 
 
 
 Care Team Providers
 
 
+-----------------------+------+-------------+
| Care Team Member Name | Role | Phone       |
+-----------------------+------+-------------+
 PCP  | Unavailable |
+-----------------------+------+-------------+
 
 
 
 Reason for Visit
 
 
+-------------------+----------+
| Reason            | Comments |
+-------------------+----------+
| Medication Refill |          |
+-------------------+----------+
 
 
 
 Encounter Details
 
 
+--------+--------+---------------------+----------------------+-------------------+
| Date   | Type   | Department          | Care Team            | Description       |
+--------+--------+---------------------+----------------------+-------------------+
| / | Refill |   [place]         |   Marzena Moser  | Medication Refill |
| 2016   |        | NEPHROLOGY  [address]   | M, DO  [address]      |                   |
|        |        | [address]   | [address]      |                   |
|        |        | [place], [place]     | [place], [place]      |                   |
|        |        | [address]          | [address]  [phone]  |                   |
|        |        | [phone]        |  [phone] (Fax)  |                   |
+--------+--------+---------------------+----------------------+-------------------+
 
 
 
 Social History
 
 
+--------------+-------+-----------+--------+------+
| Tobacco Use  | Types | Packs/Day | Years  | Date |
|              |       |           | Used   |      |
+--------------+-------+-----------+--------+------+
| Never Smoker |       |           |        |      |
+--------------+-------+-----------+--------+------+
 
 
 
+---------------------+---+---+---+
| Smokeless Tobacco:  |   |   |   |
| Never Used          |   |   |   |
+---------------------+---+---+---+
 
 
 
+---------------------------------------------------------------+
| Comments: some second hand smoke exposure, but fairly minimal |
 
+---------------------------------------------------------------+
 
 
 
+-------------+-------------+---------+----------+
| Alcohol Use | Drinks/Week | oz/Week | Comments |
+-------------+-------------+---------+----------+
| No          |             |         |          |
+-------------+-------------+---------+----------+
 
 
 
+------------------+---------------+
| Sex Assigned at  | Date Recorded |
| Birth            |               |
+------------------+---------------+
| Not on file      |               |
+------------------+---------------+
 
 
 
+----------------+-------------+-------------+
| Job Start Date | Occupation  | Industry    |
+----------------+-------------+-------------+
| Not on file    | Not on file | Not on file |
+----------------+-------------+-------------+
 
 
 
+----------------+--------------+------------+
| Travel History | Travel Start | Travel End |
+----------------+--------------+------------+
 
 
 
+-------------------------------------+
| No recent travel history available. |
+-------------------------------------+
 documented as of this encounter
 
 Plan of Treatment
 
 
+--------+-----------+------------+----------------------+-------------------+
| Date   | Type      | Specialty  | Care Team            | Description       |
+--------+-----------+------------+----------------------+-------------------+
| / | Office    | Cardiology |   HellalfredoTonia,    |                   |
|    | Visit     |            | ARNP  [address]   |                   |
|        |           |            | St  [place], [place]  |                   |
|        |           |            | [address]  [phone]  |                   |
|        |           |            |  [phone] (Fax)  |                   |
+--------+-----------+------------+----------------------+-------------------+
| / | Hospital  | Radiology  |   Popeye Townsend, |                   |
|    | Encounter |            |  MD  [address] |                   |
|        |           |            |  St.  [place],   |                   |
|        |           |            | [place] [address]             |                   |
|        |           |            | [phone]         |                   |
|        |           |            | [phone] (Fax)   |                   |
+--------+-----------+------------+----------------------+-------------------+
| / | Surgery   | Radiology  |   Popeye Townsend, | CV EP PPM SYSTEM  |
 
|    |           |            |  MD  [address] | IMPLANT           |
|        |           |            |  St.  [place],   |                   |
|        |           |            | [place] [address]             |                   |
|        |           |            | [phone]         |                   |
|        |           |            | [phone] (Fax)   |                   |
+--------+-----------+------------+----------------------+-------------------+
| 02/10/ | Clinical  | Cardiology |                      |                   |
|    | Support   |            |                      |                   |
+--------+-----------+------------+----------------------+-------------------+
| / | Office    | Cardiology |   Tonia Wilson,    |                   |
|    | Visit     |            | ARNP  [address]   |                   |
|        |           |            | [place]  |                   |
|        |           |            | [address]  [phone]  |                   |
|        |           |            |  [phone] (Fax)  |                   |
+--------+-----------+------------+----------------------+-------------------+
| / | Off-Site  | Nephrology |   Marzena Moser  |                   |
|    | Visit     |            | DO ETIENNE  [address] [place] [address]      |                   |
|        |           |            | [address]      |                   |
|        |           |            | [place]      |                   |
|        |           |            | [address]  [phone]  |                   |
|        |           |            |  [phone] (Fax)  |                   |
+--------+-----------+------------+----------------------+-------------------+
 documented as of this encounter
 
 Visit Diagnoses
 
 
+----------------------------------------------------------------------------------------+
| Diagnosis                                                                              |
+----------------------------------------------------------------------------------------+
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
| left (HCC) - Primary                                                                   |
+----------------------------------------------------------------------------------------+
 documented in this encounter

## 2020-01-08 NOTE — XMS
Encounter Summary
  Created on: 2020
 
 Viviana Ricci
 External Reference #: [id number]
 : 47
 Sex: Female
 
 Demographics
 
 
+-----------------------+----------------------+
| Address               | [address]      |
|                       | [place]  [address] |
+-----------------------+----------------------+
| Home Phone            | [phone]      |
+-----------------------+----------------------+
| Preferred Language    | Unknown              |
+-----------------------+----------------------+
| Marital Status        | Single               |
+-----------------------+----------------------+
| Sabianist Affiliation | [id number]                 |
+-----------------------+----------------------+
| Race                  | Unknown              |
+-----------------------+----------------------+
| Ethnic Group          | Unknown              |
+-----------------------+----------------------+
 
 
 Author
 
 
+--------------+--------------------------------------------+
| Author       | [facility]  |
|              | and [facility]                                |
+--------------+--------------------------------------------+
| Organization | [facility]  |
|              | and [facility]                                |
+--------------+--------------------------------------------+
| Address      | Unknown                                    |
+--------------+--------------------------------------------+
| Phone        | Unavailable                                |
+--------------+--------------------------------------------+
 
 
 
 Support
 
 
+----------------+--------------+---------------------+-----------------+
| Name           | Relationship | Address             | Phone           |
+----------------+--------------+---------------------+-----------------+
| Ada/Ed Radhames | ECON         | [address]              | [phone] |
|                |              | [place]  |                 |
|                |              |  [address]              |                 |
+----------------+--------------+---------------------+-----------------+
 
 
 
 
 Care Team Providers
 
 
+-----------------------+------+-------------+
| Care Team Member Name | Role | Phone       |
+-----------------------+------+-------------+
 PCP  | Unavailable |
+-----------------------+------+-------------+
 
 
 
 Encounter Details
 
 
+--------+-----------+----------------------+----------------------+-------------+
| Date   | Type      | Department           | Care Team            | Description |
+--------+-----------+----------------------+----------------------+-------------+
| / | [facility]  |   [facility] |   Marzena Moser  |             |
|    | Encounter |  MED CTR XRAY  [address] | M, DO  [address]      |             |
|        |           |  [place]       | [place]      |             |
|        |           | [place] [address] | [place]      |             |
|        |           |   [phone]       | [address] [phone]  |             |
|        |           |                      |  [phone] (Fax)  |             |
+--------+-----------+----------------------+----------------------+-------------+
 
 
 
 Social History
 
 
+----------------+-------+-----------+--------+------+
| Tobacco Use    | Types | Packs/Day | Years  | Date |
|                |       |           | Used   |      |
+----------------+-------+-----------+--------+------+
| Never Assessed |       |           |        |      |
+----------------+-------+-----------+--------+------+
 
 
 
+------------------+---------------+
| Sex Assigned at  | Date Recorded |
| Birth            |               |
+------------------+---------------+
| Not on file      |               |
+------------------+---------------+
 
 
 
+----------------+-------------+-------------+
| Job Start Date | Occupation  | Industry    |
+----------------+-------------+-------------+
| Not on file    | Not on file | Not on file |
+----------------+-------------+-------------+
 
 
 
+----------------+--------------+------------+
| Travel History | Travel Start | Travel End |
+----------------+--------------+------------+
 
 
 
 
+-------------------------------------+
| No recent travel history available. |
+-------------------------------------+
 documented as of this encounter
 
 Plan of Treatment
 
 
+--------+-----------+------------+----------------------+-------------------+
| Date   | Type      | Specialty  | Care Team            | Description       |
+--------+-----------+------------+----------------------+-------------------+
| / | Office    | Cardiology |   Tonia Wilson,    |                   |
|    | Visit     |            | ARNJESSICA  [address]   |                   |
|        |           |            | St  [place], [place]  |                   |
|        |           |            | [address]  [phone]  |                   |
|        |           |            |  [phone] (Fax)  |                   |
+--------+-----------+------------+----------------------+-------------------+
| / | Hospital  | Radiology  |   Popeye Townsend, |                   |
|    | Encounter |            |  MD  [address] [place] |                   |
|        |           |            |  St. [place],   |                   |
|        |           |            | [place] [address]             |                   |
|        |           |            | [phone]         |                   |
|        |           |            | [phone] (Fax)   |                   |
+--------+-----------+------------+----------------------+-------------------+
| / | Surgery   | Radiology  |   Popeye Townsend, | CV EP PPM SYSTEM  |
|    |           |            |  MD  [address] | IMPLANT           |
|        |           |            |  [place],   |                   |
|        |           |            | [place] [address]             |                   |
|        |           |            | [phone]         |                   |
|        |           |            | [phone] (Fax)   |                   |
+--------+-----------+------------+----------------------+-------------------+
| 02/10/ | Clinical  | Cardiology |                      |                   |
|    | Support   |            |                      |                   |
+--------+-----------+------------+----------------------+-------------------+
| / | Office    | Cardiology |   Tonia Wilson,    |                   |
|    | Visit     |            | BELKIS  [address]   |                   |
|        |           |            | [place]  |                   |
|        |           |            | [address]  [phone]  |                   |
|        |           |            |  [phone] (Fax)  |                   |
+--------+-----------+------------+----------------------+-------------------+
| / | Off-Site  | Nephrology |   Marzena Moser  |                   |
|    | Visit     |            | DO ETIENNE  [address] [place] [address]      |                   |
|        |           |            | [address]      |                   |
|        |           |            | [place]      |                   |
|        |           |            | [address] [phone]  |                   |
|        |           |            |  [phone] (Fax)  |                   |
+--------+-----------+------------+----------------------+-------------------+
 documented as of this encounter
 
 Visit Diagnoses
 Not on filedocumented in this encounter

## 2020-01-08 NOTE — XMS
Encounter Summary
  Created on: 2020
 
 Viviana Ricci
 External Reference #: [id number]
 : 47
 Sex: Female
 
 Demographics
 
 
+-----------------------+----------------------+
| Address               | [address]      |
|                       | [place]  [address] |
+-----------------------+----------------------+
| Home Phone            | [phone]      |
+-----------------------+----------------------+
| Preferred Language    | Unknown              |
+-----------------------+----------------------+
| Marital Status        | Single               |
+-----------------------+----------------------+
| Buddhist Affiliation | 1009                 |
+-----------------------+----------------------+
| Race                  | Unknown              |
+-----------------------+----------------------+
| Ethnic Group          | Unknown              |
+-----------------------+----------------------+
 
 
 Author
 
 
+--------------+--------------------------------------------+
| Author       | [facility]  |
|              | and [place]                                |
+--------------+--------------------------------------------+
| Organization | [facility]  |
|              | and [place]                                |
+--------------+--------------------------------------------+
| Address      | Unknown                                    |
+--------------+--------------------------------------------+
| Phone        | Unavailable                                |
+--------------+--------------------------------------------+
 
 
 
 Support
 
 
+----------------+--------------+---------------------+-----------------+
| Name           | Relationship | Address             | Phone           |
+----------------+--------------+---------------------+-----------------+
| Ada/Ed Radhames | ECON         | [address]              | [phone] |
|                |              | [place]  |                 |
|                |              |  [address]              |                 |
+----------------+--------------+---------------------+-----------------+
 
 
 
 
 Care Team Providers
 
 
+-----------------------+------+-------------+
| Care Team Member Name | Role | Phone       |
+-----------------------+------+-------------+
 PCP  | Unavailable |
+-----------------------+------+-------------+
 
 
 
 Reason for Visit
 
 
+-------------------+----------+
| Reason            | Comments |
+-------------------+----------+
| Medication Refill |          |
+-------------------+----------+
 
 
 
 Encounter Details
 
 
+--------+--------+---------------------+----------------------+-------------------+
| Date   | Type   | Department          | Care Team            | Description       |
+--------+--------+---------------------+----------------------+-------------------+
| / | Refill |   [facility]         |   Marzena Moser  | Medication Refill |
|    |        | NEPHROLOGY  [address]   | M, DO  [address]      |                   |
|        |        | [address]   | [address]      |                   |
|        |        | [place], [place]     | [place], [place]      |                   |
|        |        | [address]          | [address]  [phone]  |                   |
|        |        | [phone]        |  [phone] (Fax)  |                   |
+--------+--------+---------------------+----------------------+-------------------+
 
 
 
 Social History
 
 
+--------------+-------+-----------+--------+------+
| Tobacco Use  | Types | Packs/Day | Years  | Date |
|              |       |           | Used   |      |
+--------------+-------+-----------+--------+------+
| Never Smoker |       |           |        |      |
+--------------+-------+-----------+--------+------+
 
 
 
+---------------------+---+---+---+
| Smokeless Tobacco:  |   |   |   |
| Never Used          |   |   |   |
+---------------------+---+---+---+
 
 
 
+---------------------------------------------------------------+
| Comments: some second hand smoke exposure, but fairly minimal |
 
+---------------------------------------------------------------+
 
 
 
+-------------+-------------+---------+----------+
| Alcohol Use | Drinks/Week | oz/Week | Comments |
+-------------+-------------+---------+----------+
| No          |             |         |          |
+-------------+-------------+---------+----------+
 
 
 
+------------------+---------------+
| Sex Assigned at  | Date Recorded |
| Birth            |               |
+------------------+---------------+
| Not on file      |               |
+------------------+---------------+
 
 
 
+----------------+-------------+-------------+
| Job Start Date | Occupation  | Industry    |
+----------------+-------------+-------------+
| Not on file    | Not on file | Not on file |
+----------------+-------------+-------------+
 
 
 
+----------------+--------------+------------+
| Travel History | Travel Start | Travel End |
+----------------+--------------+------------+
 
 
 
+-------------------------------------+
| No recent travel history available. |
+-------------------------------------+
 documented as of this encounter
 
 Plan of Treatment
 
 
+--------+-----------+------------+----------------------+-------------------+
| Date   | Type      | Specialty  | Care Team            | Description       |
+--------+-----------+------------+----------------------+-------------------+
| / | Office    | Cardiology |   HellalfredoTonia,    |                   |
|    | Visit     |            | ARNP  [address]   |                   |
|        |           |            | St  [place], [place]  |                   |
|        |           |            | [address]  [phone]  |                   |
|        |           |            |  [phone] (Fax)  |                   |
+--------+-----------+------------+----------------------+-------------------+
| / | Hospital  | Radiology  |   Popeye Townsend, |                   |
|    | Encounter |            |  MD  [address] |                   |
|        |           |            |  St.  [place],   |                   |
|        |           |            | [place] [address]             |                   |
|        |           |            | [phone]         |                   |
|        |           |            | [phone] (Fax)   |                   |
+--------+-----------+------------+----------------------+-------------------+
| / | Surgery   | Radiology  |   Popeye Townsend, | CV EP PPM SYSTEM  |
 
|    |           |            |  MD  [address] | IMPLANT           |
|        |           |            |  St.  [place],   |                   |
|        |           |            | [place] [address]             |                   |
|        |           |            | [phone]         |                   |
|        |           |            | [phone] (Fax)   |                   |
+--------+-----------+------------+----------------------+-------------------+
| 02/10/ | Clinical  | Cardiology |                      |                   |
|    | Support   |            |                      |                   |
+--------+-----------+------------+----------------------+-------------------+
| / | Office    | Cardiology |   Tonia Wilson,    |                   |
|    | Visit     |            | ARNP  [address]   |                   |
|        |           |            | [place]  |                   |
|        |           |            | [address]  [phone]  |                   |
|        |           |            |  [phone] (Fax)  |                   |
+--------+-----------+------------+----------------------+-------------------+
| / | Off-Site  | Nephrology |   Marzena Moser  |                   |
|    | Visit     |            | DO ETIENNE  [address] [place] [address]      |                   |
|        |           |            | [address]      |                   |
|        |           |            | [place]      |                   |
|        |           |            | [address]  [phone]  |                   |
|        |           |            |  [phone] (Fax)  |                   |
+--------+-----------+------------+----------------------+-------------------+
 documented as of this encounter
 
 Visit Diagnoses
 Not on filedocumented in this encounter

## 2020-01-08 NOTE — XMS
Encounter Summary
  Created on: 2020
 
 Viviana Ricci
 External Reference #: [id number]
 : 47
 Sex: Female
 
 Demographics
 
 
+-----------------------+----------------------+
| Address               | [address]      |
|                       | [place]  [address] |
+-----------------------+----------------------+
| Home Phone            | [phone]      |
+-----------------------+----------------------+
| Preferred Language    | Unknown              |
+-----------------------+----------------------+
| Marital Status        | Single               |
+-----------------------+----------------------+
| Church Affiliation | [id number]                 |
+-----------------------+----------------------+
| Race                  | Unknown              |
+-----------------------+----------------------+
| Ethnic Group          | Unknown              |
+-----------------------+----------------------+
 
 
 Author
 
 
+--------------+--------------------------------------------+
| Author       | [facility]  |
|              | and [place]                                |
+--------------+--------------------------------------------+
| Organization | [facility]  |
|              | and [place]                                |
+--------------+--------------------------------------------+
| Address      | Unknown                                    |
+--------------+--------------------------------------------+
| Phone        | Unavailable                                |
+--------------+--------------------------------------------+
 
 
 
 Support
 
 
+----------------+--------------+---------------------+-----------------+
| Name           | Relationship | Address             | Phone           |
+----------------+--------------+---------------------+-----------------+
| Ada/Ed Radhames | ECON         | [place]              | [phone] |
|                |              | [place]  |                 |
|                |              |  [address]              |                 |
+----------------+--------------+---------------------+-----------------+
 
 
 
 
 Care Team Providers
 
 
+-----------------------+------+-------------+
| Care Team Member Name | Role | Phone       |
+-----------------------+------+-------------+
 PCP  | Unavailable |
+-----------------------+------+-------------+
 
 
 
 Encounter Details
 
 
+--------+----------+---------------------+----------------------+-------------+
| Date   | Type     | Department          | Care Team            | Description |
+--------+----------+---------------------+----------------------+-------------+
| / | Abstract |   [facility]         |   Marzena Moser  |             |
|    |          | NEPHROLOGY  [address]   | M, DO  [address]      |             |
|        |          | [address]   | [address]      |             |
|        |          | [place], [place]     | [place]      |             |
|        |          | [address]          | [address]  [phone]  |             |
|        |          | [phone]        |  [phone] (Fax)  |             |
+--------+----------+---------------------+----------------------+-------------+
 
 
 
 Social History
 
 
+--------------+-------+-----------+--------+------+
| Tobacco Use  | Types | Packs/Day | Years  | Date |
|              |       |           | Used   |      |
+--------------+-------+-----------+--------+------+
| Never Smoker |       |           |        |      |
+--------------+-------+-----------+--------+------+
 
 
 
+---------------------+---+---+---+
| Smokeless Tobacco:  |   |   |   |
| Never Used          |   |   |   |
+---------------------+---+---+---+
 
 
 
+---------------------------------------------------------------+
| Comments: some second hand smoke exposure, but fairly minimal |
+---------------------------------------------------------------+
 
 
 
+-------------+-------------+---------+----------+
| Alcohol Use | Drinks/Week | oz/Week | Comments |
+-------------+-------------+---------+----------+
| No          |             |         |          |
+-------------+-------------+---------+----------+
 
 
 
 
+------------------+---------------+
| Sex Assigned at  | Date Recorded |
| Birth            |               |
+------------------+---------------+
| Not on file      |               |
+------------------+---------------+
 
 
 
+----------------+-------------+-------------+
| Job Start Date | Occupation  | Industry    |
+----------------+-------------+-------------+
| Not on file    | Not on file | Not on file |
+----------------+-------------+-------------+
 
 
 
+----------------+--------------+------------+
| Travel History | Travel Start | Travel End |
+----------------+--------------+------------+
 
 
 
+-------------------------------------+
| No recent travel history available. |
+-------------------------------------+
 documented as of this encounter
 
 Plan of Treatment
 
 
+--------+-----------+------------+----------------------+-------------------+
| Date   | Type      | Specialty  | Care Team            | Description       |
+--------+-----------+------------+----------------------+-------------------+
| / | Office    | Cardiology |   Tonia Wilson,    |                   |
|    | Visit     |            | ARNJESSICA  [address]   |                   |
|        |           |            | [place]  |                   |
|        |           |            | [address]  [phone]  |                   |
|        |           |            |  [phone] (Fax)  |                   |
+--------+-----------+------------+----------------------+-------------------+
| / | Hospital  | Radiology  |   Popeye Townsend, |                   |
|    | Encounter |            |  MD  [address] |                   |
|        |           |            |  St.  [place],   |                   |
|        |           |            | [place] [address]             |                   |
|        |           |            | [phone]         |                   |
|        |           |            | [phone] (Fax)   |                   |
+--------+-----------+------------+----------------------+-------------------+
| / | Surgery   | Radiology  |   Popeye Townsend, | CV EP PPM SYSTEM  |
|    |           |            |  MD  [address] | IMPLANT           |
|        |           |            |  St.  [place],   |                   |
|        |           |            | [place] [address]             |                   |
|        |           |            | [phone]         |                   |
|        |           |            | [phone] (Fax)   |                   |
+--------+-----------+------------+----------------------+-------------------+
| 02/10/ | Clinical  | Cardiology |                      |                   |
|    | Support   |            |                      |                   |
+--------+-----------+------------+----------------------+-------------------+
| / | Office    | Cardiology |   Tonia Wilson,    |                   |
|    | Visit     |            | ARNP  [address]   |                   |
 
|        |           |            | St  [place], [place]  |                   |
|        |           |            | [address] [phone]  |                   |
|        |           |            |  [phone] (Fax)  |                   |
+--------+-----------+------------+----------------------+-------------------+
| / | Off-Site  | Nephrology |   Marzena Moser  |                   |
| 2020   | Visit     |            | DO ETIENNE  [address] [place] [address]      |                   |
|        |           |            | [address]      |                   |
|        |           |            | [place]      |                   |
|        |           |            | [id number] [phone]  |                   |
|        |           |            |  [phone] (Fax)  |                   |
+--------+-----------+------------+----------------------+-------------------+
 documented as of this encounter
 
 Visit Diagnoses
 Not on filedocumented in this encounter

## 2020-01-08 NOTE — XMS
Encounter Summary
  Created on: 2020
 
 Viviana Ricci
 External Reference #: [id number]
 : 47
 Sex: Female
 
 Demographics
 
 
+-----------------------+----------------------+
| Address               | [address]      |
|                       | [place]  [address] |
+-----------------------+----------------------+
| Home Phone            | [phone]      |
+-----------------------+----------------------+
| Preferred Language    | Unknown              |
+-----------------------+----------------------+
| Marital Status        | Single               |
+-----------------------+----------------------+
| Caodaism Affiliation | [id number]                 |
+-----------------------+----------------------+
| Race                  | Unknown              |
+-----------------------+----------------------+
| Ethnic Group          | Unknown              |
+-----------------------+----------------------+
 
 
 Author
 
 
+--------------+--------------------------------------------+
| Author       | [facility]  |
|              | and [place]                                |
+--------------+--------------------------------------------+
| Organization | [facility]  |
|              | and [place]                                |
+--------------+--------------------------------------------+
| Address      | Unknown                                    |
+--------------+--------------------------------------------+
| Phone        | Unavailable                                |
+--------------+--------------------------------------------+
 
 
 
 Support
 
 
+----------------+--------------+---------------------+-----------------+
| Name           | Relationship | Address             | Phone           |
+----------------+--------------+---------------------+-----------------+
| Ada/Ed Radhames | ECON         | [place]              | [phone] |
|                |              | [place], [place]  |                 |
|                |              |  [address]              |                 |
+----------------+--------------+---------------------+-----------------+
 
 
 
 
 Care Team Providers
 
 
+-----------------------+------+-------------+
| Care Team Member Name | Role | Phone       |
+-----------------------+------+-------------+
 PCP  | Unavailable |
+-----------------------+------+-------------+
 
 
 
 Encounter Details
 
 
+--------+-------------+---------------------+----------------------+----------------------+
| Date   | Type        | Department          | Care Team            | Description          |
+--------+-------------+---------------------+----------------------+----------------------+
| / | Orders Only |   [facility]         |   Letty Hart  | Chronic venous       |
| 2016   |             | NEPHROLOGY  [address]   | M, RN                | embolism and         |
|        |             | [address]   |                      | thrombosis of deep   |
|        |             | [place], [place]     |                      | vessels of proximal  |
|        |             | [address]          |                      | lower extremity,     |
|        |             | [phone]        |                      | left (HCC) (Primary  |
|        |             |                     |                      | Dx)                  |
+--------+-------------+---------------------+----------------------+----------------------+
 
 
 
 Social History
 
 
+--------------+-------+-----------+--------+------+
| Tobacco Use  | Types | Packs/Day | Years  | Date |
|              |       |           | Used   |      |
+--------------+-------+-----------+--------+------+
| Never Smoker |       |           |        |      |
+--------------+-------+-----------+--------+------+
 
 
 
+---------------------+---+---+---+
| Smokeless Tobacco:  |   |   |   |
| Never Used          |   |   |   |
+---------------------+---+---+---+
 
 
 
+---------------------------------------------------------------+
| Comments: some second hand smoke exposure, but fairly minimal |
+---------------------------------------------------------------+
 
 
 
+-------------+-------------+---------+----------+
| Alcohol Use | Drinks/Week | oz/Week | Comments |
+-------------+-------------+---------+----------+
| No          |             |         |          |
+-------------+-------------+---------+----------+
 
 
 
 
+------------------+---------------+
| Sex Assigned at  | Date Recorded |
| Birth            |               |
+------------------+---------------+
| Not on file      |               |
+------------------+---------------+
 
 
 
+----------------+-------------+-------------+
| Job Start Date | Occupation  | Industry    |
+----------------+-------------+-------------+
| Not on file    | Not on file | Not on file |
+----------------+-------------+-------------+
 
 
 
+----------------+--------------+------------+
| Travel History | Travel Start | Travel End |
+----------------+--------------+------------+
 
 
 
+-------------------------------------+
| No recent travel history available. |
+-------------------------------------+
 documented as of this encounter
 
 Plan of Treatment
 
 
+--------+-----------+------------+----------------------+-------------------+
| Date   | Type      | Specialty  | Care Team            | Description       |
+--------+-----------+------------+----------------------+-------------------+
| / | Office    | Cardiology |   Tonia Wilson,    |                   |
|    | Visit     |            | [address]   |                   |
|        |           |            | St  [place]  |                   |
|        |           |            | [address]  [phone]  |                   |
|        |           |            |  [phone] (Fax)  |                   |
+--------+-----------+------------+----------------------+-------------------+
| / | Hospital  | Radiology  |   Popeye Townsend, |                   |
|    | Encounter |            |  MD  [address] |                   |
|        |           |            |  St.  [place],   |                   |
|        |           |            | [place] [address]             |                   |
|        |           |            | [phone]         |                   |
|        |           |            | [phone] (Fax)   |                   |
+--------+-----------+------------+----------------------+-------------------+
| / | Surgery   | Radiology  |   Popeye Townsend, | CV EP PPM SYSTEM  |
|    |           |            |  MD  [address] | IMPLANT           |
|        |           |            |  St.  [place],   |                   |
|        |           |            | [place] [address]             |                   |
|        |           |            | [phone]         |                   |
|        |           |            | [phone] (Fax)   |                   |
+--------+-----------+------------+----------------------+-------------------+
| 02/10/ | Clinical  | Cardiology |                      |                   |
2020   | Support   |            |                      |                   |
+--------+-----------+------------+----------------------+-------------------+
| / | Office    | Cardiology |   Tonia Wilson,    |                   |
 
|    | Visit     |            | ARNP  [address]   |                   |
|        |           |            | St  [place]  |                   |
|        |           |            | [address]  [phone]  |                   |
|        |           |            |  [phone] (Fax)  |                   |
+--------+-----------+------------+----------------------+-------------------+
| / | Off-Site  | Nephrology |   Marzena Moser  |                   |
|    | Visit     |            | DO ETIENNE  [address]      |                   |
|        |           |            | [address]      |                   |
|        |           |            | [place]      |                   |
|        |           |            | [address]  [phone]  |                   |
|        |           |            |  [phone] (Fax)  |                   |
+--------+-----------+------------+----------------------+-------------------+
 documented as of this encounter
 
 Visit Diagnoses
 
 
+----------------------------------------------------------------------------------------+
| Diagnosis                                                                              |
+----------------------------------------------------------------------------------------+
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
| left (HCC) - Primary                                                                   |
+----------------------------------------------------------------------------------------+
 documented in this encounter

## 2020-01-08 NOTE — XMS
Encounter Summary
  Created on: 2020
 
 Viviana Ricci
 External Reference #: [id number]
 : 47
 Sex: Female
 
 Demographics
 
 
+-----------------------+----------------------+
| Address               | [address]      |
|                       | [place]  [address] |
+-----------------------+----------------------+
| Home Phone            | [phone]      |
+-----------------------+----------------------+
| Preferred Language    | Unknown              |
+-----------------------+----------------------+
| Marital Status        | Single               |
+-----------------------+----------------------+
| Nondenominational Affiliation | [id number]                 |
+-----------------------+----------------------+
| Race                  | Unknown              |
+-----------------------+----------------------+
| Ethnic Group          | Unknown              |
+-----------------------+----------------------+
 
 
 Author
 
 
+--------------+--------------------------------------------+
| Author       | [facility]  |
|              | and [place]                                |
+--------------+--------------------------------------------+
| Organization | [facility]  |
|              | and [place]                                |
+--------------+--------------------------------------------+
| Address      | Unknown                                    |
+--------------+--------------------------------------------+
| Phone        | Unavailable                                |
+--------------+--------------------------------------------+
 
 
 
 Support
 
 
+----------------+--------------+---------------------+-----------------+
| Name           | Relationship | Address             | Phone           |
+----------------+--------------+---------------------+-----------------+
| Ada/Ed Radhames | ECON         | [place]              | [phone] |
|                |              | [place]  |                 |
|                |              |  [address]              |                 |
+----------------+--------------+---------------------+-----------------+
 
 
 
 
 Care Team Providers
 
 
+-----------------------+------+-------------+
| Care Team Member Name | Role | Phone       |
+-----------------------+------+-------------+
 PCP  | Unavailable |
+-----------------------+------+-------------+
 
 
 
 Reason for Visit
 
 
+-------------------+----------+
| Reason            | Comments |
+-------------------+----------+
| Medication Refill |          |
+-------------------+----------+
 
 
 
 Encounter Details
 
 
+--------+--------+---------------------+----------------------+-------------------+
| Date   | Type   | Department          | Care Team            | Description       |
+--------+--------+---------------------+----------------------+-------------------+
| / | Refill |   [facility]         |   Marzena Moser  | Medication Refill |
| 2018   |        | NEPHROLOGY  [address]   | M, DO  [address]      |                   |
|        |        | [address]   | [address]      |                   |
|        |        | [place], [place]     | [place], [place]      |                   |
|        |        | [address]          | [address]  [phone]  |                   |
|        |        | [phone]        |  [phone] (Fax)  |                   |
+--------+--------+---------------------+----------------------+-------------------+
 
 
 
 Social History
 
 
+--------------+-------+-----------+--------+------+
| Tobacco Use  | Types | Packs/Day | Years  | Date |
|              |       |           | Used   |      |
+--------------+-------+-----------+--------+------+
| Never Smoker |       |           |        |      |
+--------------+-------+-----------+--------+------+
 
 
 
+---------------------+---+---+---+
| Smokeless Tobacco:  |   |   |   |
| Never Used          |   |   |   |
+---------------------+---+---+---+
 
 
 
+---------------------------------------------------------------+
| Comments: some second hand smoke exposure, but fairly minimal |
 
+---------------------------------------------------------------+
 
 
 
+-------------+-------------+---------+----------+
| Alcohol Use | Drinks/Week | oz/Week | Comments |
+-------------+-------------+---------+----------+
| No          |             |         |          |
+-------------+-------------+---------+----------+
 
 
 
+------------------+---------------+
| Sex Assigned at  | Date Recorded |
| Birth            |               |
+------------------+---------------+
| Not on file      |               |
+------------------+---------------+
 
 
 
+----------------+-------------+-------------+
| Job Start Date | Occupation  | Industry    |
+----------------+-------------+-------------+
| Not on file    | Not on file | Not on file |
+----------------+-------------+-------------+
 
 
 
+----------------+--------------+------------+
| Travel History | Travel Start | Travel End |
+----------------+--------------+------------+
 
 
 
+-------------------------------------+
| No recent travel history available. |
+-------------------------------------+
 documented as of this encounter
 
 Plan of Treatment
 
 
+--------+-----------+------------+----------------------+-------------------+
| Date   | Type      | Specialty  | Care Team            | Description       |
+--------+-----------+------------+----------------------+-------------------+
| / | Office    | Cardiology |   HellalfredoTonia,    |                   |
|    | Visit     |            | ARNP  [address]   |                   |
|        |           |            | St  [place], [place]  |                   |
|        |           |            | [address]  [phone]  |                   |
|        |           |            |  [phone] (Fax)  |                   |
+--------+-----------+------------+----------------------+-------------------+
| / | Hospital  | Radiology  |   Popeye Townsend, |                   |
|    | Encounter |            |  MD  [address] |                   |
|        |           |            |  St.  [place],   |                   |
|        |           |            | [place] [address]             |                   |
|        |           |            | [phone]         |                   |
|        |           |            | [phone] (Fax)   |                   |
+--------+-----------+------------+----------------------+-------------------+
| / | Surgery   | Radiology  |   Popeye Townsend, | CV EP PPM SYSTEM  |
 
|    |           |            |  MD  [address] | IMPLANT           |
|        |           |            |  St.  [place],   |                   |
|        |           |            | [place] [address]             |                   |
|        |           |            | [phone]         |                   |
|        |           |            | [phone] (Fax)   |                   |
+--------+-----------+------------+----------------------+-------------------+
| 02/10/ | Clinical  | Cardiology |                      |                   |
|    | Support   |            |                      |                   |
+--------+-----------+------------+----------------------+-------------------+
| / | Office    | Cardiology |   Tonia Wilson,    |                   |
|    | Visit     |            | ARNP  [address]   |                   |
|        |           |            | [place]  |                   |
|        |           |            | [address]  [phone]  |                   |
|        |           |            |  [phone] (Fax)  |                   |
+--------+-----------+------------+----------------------+-------------------+
| / | Off-Site  | Nephrology |   Marzena Moser  |                   |
|    | Visit     |            | DO ETIENNE  [address] [place] [address]      |                   |
|        |           |            | [address]      |                   |
|        |           |            | [place]      |                   |
|        |           |            | [address]  [phone]  |                   |
|        |           |            |  [phone] (Fax)  |                   |
+--------+-----------+------------+----------------------+-------------------+
 documented as of this encounter
 
 Visit Diagnoses
 
 
+---------------------------------+
| Diagnosis                       |
+---------------------------------+
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
+---------------------------------+
 documented in this encounter

## 2020-01-08 NOTE — XMS
Encounter Summary
  Created on: 2020
 
 Viviana Ricci
 External Reference #: [id number]
 : 47
 Sex: Female
 
 Demographics
 
 
+-----------------------+----------------------+
| Address               | [address]      |
|                       | [place]  [address] |
+-----------------------+----------------------+
| Home Phone            | [phone]      |
+-----------------------+----------------------+
| Preferred Language    | Unknown              |
+-----------------------+----------------------+
| Marital Status        | Single               |
+-----------------------+----------------------+
| Protestant Affiliation | 1009                 |
+-----------------------+----------------------+
| Race                  | Unknown              |
+-----------------------+----------------------+
| Ethnic Group          | Unknown              |
+-----------------------+----------------------+
 
 
 Author
 
 
+--------------+--------------------------------------------+
| Author       | [facility]  |
|              | and [place]                                |
+--------------+--------------------------------------------+
| Organization | [facility]  |
|              | and [place]                                |
+--------------+--------------------------------------------+
| Address      | Unknown                                    |
+--------------+--------------------------------------------+
| Phone        | Unavailable                                |
+--------------+--------------------------------------------+
 
 
 
 Support
 
 
+----------------+--------------+---------------------+-----------------+
| Name           | Relationship | Address             | Phone           |
+----------------+--------------+---------------------+-----------------+
| Ada/Ed Radhames | ECON         | [place]              | [phone] |
|                |              | [place]  |                 |
|                |              |  [address]              |                 |
+----------------+--------------+---------------------+-----------------+
 
 
 
 
 Care Team Providers
 
 
+-----------------------+------+-------------+
| Care Team Member Name | Role | Phone       |
+-----------------------+------+-------------+
 PCP  | Unavailable |
+-----------------------+------+-------------+
 
 
 
 Encounter Details
 
 
+--------+-------------+---------------------+----------------------+-------------+
| Date   | Type        | Department          | Care Team            | Description |
+--------+-------------+---------------------+----------------------+-------------+
| / | Orders Only |   [facility]  [place]         |   Marzena Moser  |             |
|    |             | NEPHROLOGY  [address]   | M, DO  [address]      |             |
|        |             | [address]   | [address]      |             |
|        |             | [place]     | [place]      |             |
|        |             | [address]          | [address]  [phone]  |             |
|        |             | [phone]        |  [phone] (Fax)  |             |
+--------+-------------+---------------------+----------------------+-------------+
 
 
 
 Social History
 
 
+--------------+-------+-----------+--------+------+
| Tobacco Use  | Types | Packs/Day | Years  | Date |
|              |       |           | Used   |      |
+--------------+-------+-----------+--------+------+
| Never Smoker |       |           |        |      |
+--------------+-------+-----------+--------+------+
 
 
 
+---------------------+---+---+---+
| Smokeless Tobacco:  |   |   |   |
| Never Used          |   |   |   |
+---------------------+---+---+---+
 
 
 
+---------------------------------------------------------------+
| Comments: some second hand smoke exposure, but fairly minimal |
+---------------------------------------------------------------+
 
 
 
+-------------+-------------+---------+----------+
| Alcohol Use | Drinks/Week | oz/Week | Comments |
+-------------+-------------+---------+----------+
| No          |             |         |          |
+-------------+-------------+---------+----------+
 
 
 
 
+------------------+---------------+
| Sex Assigned at  | Date Recorded |
| Birth            |               |
+------------------+---------------+
| Not on file      |               |
+------------------+---------------+
 
 
 
+----------------+-------------+-------------+
| Job Start Date | Occupation  | Industry    |
+----------------+-------------+-------------+
| Not on file    | Not on file | Not on file |
+----------------+-------------+-------------+
 
 
 
+----------------+--------------+------------+
| Travel History | Travel Start | Travel End |
+----------------+--------------+------------+
 
 
 
+-------------------------------------+
| No recent travel history available. |
+-------------------------------------+
 documented as of this encounter
 
 Progress Notes
 Marzena Moser DO - 2013  8:55 AM PDTNEPHROLOGY
 
 I called [facility].  Apparently, Viviana's UA did not meet threshold for reflex culture.  I ca
lled the pt and she is still having increased symptoms.  [facility] will send the same urine 
for culture.  She is a fairly accurate historian, therefore, will start Cephalexin 500 mg, B
ID, x3 days, pending the culture results.  E-Rx'd to [facility]  [place].
 
 CC:  [facility].Electronically signed by Marzena Moser DO at 2013  
8:59 AM PDTdocumented in this encounter
 
 Plan of Treatment
 
 
+--------+-----------+------------+----------------------+-------------------+
| Date   | Type      | Specialty  | Care Team            | Description       |
+--------+-----------+------------+----------------------+-------------------+
| / | Office    | Cardiology |   Tonia Wilson,    |                   |
|    | Visit     |            | ARNP  [address]   |                   |
|        |           |            | St [place], [place]  |                   |
|        |           |            | [address]  [phone]  |                   |
|        |           |            |  [phone] (Fax)  |                   |
+--------+-----------+------------+----------------------+-------------------+
| / | Hospital  | Radiology  |   Popeye Townsend, |                   |
|    | Encounter |            |  MD  [address] |                   |
|        |           |            |  [place],   |                   |
|        |           |            | [place] [address]             |                   |
|        |           |            | [phone]         |                   |
|        |           |            | [phone] (Fax)   |                   |
+--------+-----------+------------+----------------------+-------------------+
| / | Surgery   | Radiology  |   Popeye Townsend, | CV EP PPM SYSTEM  |
 
|    |           |            |  MD  [address] | IMPLANT           |
|        |           |            |  [place],   |                   |
|        |           |            | [place] [address]             |                   |
|        |           |            | [phone]         |                   |
|        |           |            | [phone] (Fax)   |                   |
+--------+-----------+------------+----------------------+-------------------+
| 02/10/ | Clinical  | Cardiology |                      |                   |
|    | Support   |            |                      |                   |
+--------+-----------+------------+----------------------+-------------------+
| / | Office    | Cardiology |   RakeshTonia andre,    |                   |
|    | Visit     |            | ARNP  [address]   |                   |
|        |           |            | [place]  |                   |
|        |           |            | [address] [phone]  |                   |
|        |           |            |  [phone] (Fax)  |                   |
+--------+-----------+------------+----------------------+-------------------+
| / | Off-Site  | Nephrology |   Marzena Moser  |                   |
|    | Visit     |            | DO TEIENNE  [address] [place] [address]      |                   |
|        |           |            | [address]      |                   |
|        |           |            | [place]      |                   |
|        |           |            | [address] [phone]  |                   |
|        |           |            |  [phone] (Fax)  |                   |
+--------+-----------+------------+----------------------+-------------------+
 documented as of this encounter
 
 Visit Diagnoses
 Not on filedocumented in this encounter

## 2020-01-08 NOTE — XMS
Encounter Summary
  Created on: 2020
 
 Viviana Ricci
 External Reference #: [id number]
 : 47
 Sex: Female
 
 Demographics
 
 
+-----------------------+----------------------+
| Address               | [address]      |
|                       | [place]  [address] |
+-----------------------+----------------------+
| Home Phone            | [phone]      |
+-----------------------+----------------------+
| Preferred Language    | Unknown              |
+-----------------------+----------------------+
| Marital Status        | Single               |
+-----------------------+----------------------+
| Jewish Affiliation | [id number]                 |
+-----------------------+----------------------+
| Race                  | Unknown              |
+-----------------------+----------------------+
| Ethnic Group          | Unknown              |
+-----------------------+----------------------+
 
 
 Author
 
 
+--------------+--------------------------------------------+
| Author       | [facility]  |
|              | and [facility]                                |
+--------------+--------------------------------------------+
| Organization | [facility]  |
|              | and [facility]                                |
+--------------+--------------------------------------------+
| Address      | Unknown                                    |
+--------------+--------------------------------------------+
| Phone        | Unavailable                                |
+--------------+--------------------------------------------+
 
 
 
 Support
 
 
+----------------+--------------+---------------------+-----------------+
| Name           | Relationship | Address             | Phone           |
+----------------+--------------+---------------------+-----------------+
| Ada/Ed Radhames | ECON         | [address]              | [phone] |
|                |              | [place]  |                 |
|                |              |  [address]              |                 |
+----------------+--------------+---------------------+-----------------+
 
 
 
 
 Care Team Providers
 
 
+------------------------+------+-----------------+
| Care Team Member Name  | Role | Phone           |
+------------------------+------+-----------------+
| Marzena Moser DO | PCP  | [phone] |
+------------------------+------+-----------------+
 
 
 
 Encounter Details
 
 
+--------+----------+---------------------+----------------------+-------------+
| Date   | Type     | Department          | Care Team            | Description |
+--------+----------+---------------------+----------------------+-------------+
| / | Abstract |   [facility]         |   Marzena Moser  |             |
| 2019   |          | NEPHROLOGY  [address]   | [address]      |             |
|        |          | [address]   | [address]      |             |
|        |          | [place], [place]     | [place], [place]      |             |
|        |          | [address]          | [address]  [phone]  |             |
|        |          | [phone]        |  [phone] (Fax)  |             |
+--------+----------+---------------------+----------------------+-------------+
 
 
 
 Social History
 
 
+--------------+-------+-----------+--------+------+
| Tobacco Use  | Types | Packs/Day | Years  | Date |
|              |       |           | Used   |      |
+--------------+-------+-----------+--------+------+
| Never Smoker |       |           |        |      |
+--------------+-------+-----------+--------+------+
 
 
 
+---------------------+---+---+---+
| Smokeless Tobacco:  |   |   |   |
| Never Used          |   |   |   |
+---------------------+---+---+---+
 
 
 
+---------------------------------------------------------------+
| Comments: some second hand smoke exposure, but fairly minimal |
+---------------------------------------------------------------+
 
 
 
+-------------+-------------+---------+----------+
| Alcohol Use | Drinks/Week | oz/Week | Comments |
+-------------+-------------+---------+----------+
| No          |             |         |          |
+-------------+-------------+---------+----------+
 
 
 
 
+------------------+---------------+
| Sex Assigned at  | Date Recorded |
| Birth            |               |
+------------------+---------------+
| Not on file      |               |
+------------------+---------------+
 
 
 
+----------------+-------------+-------------+
| Job Start Date | Occupation  | Industry    |
+----------------+-------------+-------------+
| Not on file    | Not on file | Not on file |
+----------------+-------------+-------------+
 
 
 
+----------------+--------------+------------+
| Travel History | Travel Start | Travel End |
+----------------+--------------+------------+
 
 
 
+-------------------------------------+
| No recent travel history available. |
+-------------------------------------+
 documented as of this encounter
 
 Plan of Treatment
 
 
+--------+-----------+------------+----------------------+-------------------+
| Date   | Type      | Specialty  | Care Team            | Description       |
+--------+-----------+------------+----------------------+-------------------+
| / | Office    | Cardiology |   Tonia Wilson,    |                   |
|    | Visit     |            | ARNP  [address]   |                   |
|        |           |            | St  [place] [facility] [place]  |                   |
|        |           |            | [address]  [phone]  |                   |
|        |           |            |  [phone] (Fax)  |                   |
+--------+-----------+------------+----------------------+-------------------+
| / | Hospital  | Radiology  |   Popeye Townsend, |                   |
|    | Encounter |            |  MD  [address] |                   |
|        |           |            |  St.  [place],   |                   |
|        |           |            | [place] [address]             |                   |
|        |           |            | [phone]         |                   |
|        |           |            | [phone] (Fax)   |                   |
+--------+-----------+------------+----------------------+-------------------+
| / | Surgery   | Radiology  |   Popeye Townsend, | CV EP PPM SYSTEM  |
|    |           |            |  MD  [address] | IMPLANT           |
|        |           |            |  St.  [place],   |                   |
|        |           |            | [place] [address]             |                   |
|        |           |            | [phone]         |                   |
|        |           |            | [phone] (Fax)   |                   |
+--------+-----------+------------+----------------------+-------------------+
| 02/10/ | Clinical  | Cardiology |                      |                   |
2020   | Support   |            |                      |                   |
+--------+-----------+------------+----------------------+-------------------+
| / | Office    | Cardiology |   Tonia Wilson,    |                   |
|    | Visit     |            | [facility]  [address]   |                   |
 
|        |           |            |   [place]  |                   |
|        |           |            | [address]  [phone]  |                   |
|        |           |            |  [phone] (Fax)  |                   |
+--------+-----------+------------+----------------------+-------------------+
| / | Off-Site  | Nephrology |   Marzena Moser  |                   |
2020   | Visit     |            | DO [facility]TIENNE  [address]      |                   |
|        |           |            | [address]      |                   |
|        |           |            | [place]      |                   |
|        |           |            | [address]  [phone]  |                   |
|        |           |            |  [phone] (Fax)  |                   |
+--------+-----------+------------+----------------------+-------------------+
 documented as of this encounter
 
 Procedures
 
 
+----------------------+--------+------------+----------------------+----------------------+
| Procedure Name       | Priori | Date/Time  | Associated Diagnosis | Comments             |
|                      | ty     |            |                      |                      |
+----------------------+--------+------------+----------------------+----------------------+
| EXTERNAL LAB: BUN    | Routin | 2019 |                      |   Results for this   |
|                      | e      |            |                      | procedure are in the |
|                      |        |            |                      |  results section.    |
+----------------------+--------+------------+----------------------+----------------------+
| EXTERNAL LAB:        | Routin | 2019 |                      |   Results for this   |
| GLUCOSE              | e      |            |                      | procedure are in the |
|                      |        |            |                      |  results section.    |
+----------------------+--------+------------+----------------------+----------------------+
| EXTERNAL LAB: ALT    | Routin | 2019 |                      |   Results for this   |
|                      | e      |            |                      | procedure are in the |
|                      |        |            |                      |  results section.    |
+----------------------+--------+------------+----------------------+----------------------+
| EXTERNAL LAB: ELOY    | Routin | 2019 |                      |   Results for this   |
|                      | e      |            |                      | procedure are in the |
|                      |        |            |                      |  results section.    |
+----------------------+--------+------------+----------------------+----------------------+
| EXTERNAL LAB:        | Routin | 2019 |                      |   Results for this   |
| ALKALINE PHOSPHATASE | e      |            |                      | procedure are in the |
|                      |        |            |                      |  results section.    |
+----------------------+--------+------------+----------------------+----------------------+
| EXTERNAL LAB:        | Routin | 2019 |                      |   Results for this   |
| BILIRUBIN, TOTAL     | e      |            |                      | procedure are in the |
|                      |        |            |                      |  results section.    |
+----------------------+--------+------------+----------------------+----------------------+
| EXTERNAL LAB:        | Routin | 2019 |                      |   Results for this   |
| ALBUMIN              | e      |            |                      | procedure are in the |
|                      |        |            |                      |  results section.    |
+----------------------+--------+------------+----------------------+----------------------+
| EXTERNAL LAB:        | Routin | 2019 |                      |   Results for this   |
| PROTEIN, TOTAL       | e      |            |                      | procedure are in the |
|                      |        |            |                      |  results section.    |
+----------------------+--------+------------+----------------------+----------------------+
| EXTERNAL LAB:        | Routin | 2019 |                      |   Results for this   |
| PHOSPHORUS           | e      |            |                      | procedure are in the |
|                      |        |            |                      |  results section.    |
+----------------------+--------+------------+----------------------+----------------------+
| EXTERNAL LAB:        | Routin | 2019 |                      |   Results for this   |
| MAGNESIUM            | e      |            |                      | procedure are in the |
|                      |        |            |                      |  results section.    |
+----------------------+--------+------------+----------------------+----------------------+
 
| EXTERNAL LAB:        | Routin | 2019 |                      |   Results for this   |
| CALCIUM              | e      |            |                      | procedure are in the |
|                      |        |            |                      |  results section.    |
+----------------------+--------+------------+----------------------+----------------------+
| EXTERNAL LAB: CARBON | Routin | 2019 |                      |   Results for this   |
|  DIOXIDE             | e      |            |                      | procedure are in the |
|                      |        |            |                      |  results section.    |
+----------------------+--------+------------+----------------------+----------------------+
| EXTERNAL LAB:        | Routin | 2019 |                      |   Results for this   |
| CHLORIDE             | e      |            |                      | procedure are in the |
|                      |        |            |                      |  results section.    |
+----------------------+--------+------------+----------------------+----------------------+
| EXTERNAL LAB:        | Routin | 2019 |                      |   Results for this   |
| POTASSIUM            | e      |            |                      | procedure are in the |
|                      |        |            |                      |  results section.    |
+----------------------+--------+------------+----------------------+----------------------+
| EXTERNAL LAB: SODIUM | Routin | 2019 |                      |   Results for this   |
|                      | e      |            |                      | procedure are in the |
|                      |        |            |                      |  results section.    |
+----------------------+--------+------------+----------------------+----------------------+
| EXTERNAL LAB: CBC    | Routin | 2019 |                      |   Results for this   |
|                      | e      |            |                      | procedure are in the |
|                      |        |            |                      |  results section.    |
+----------------------+--------+------------+----------------------+----------------------+
| TACROLIMUS TROUGH    | Routin | 2019 |                      |   Results for this   |
| LC-MS/MS             | e      |            |                      | procedure are in the |
|                      |        |            |                      |  results section.    |
+----------------------+--------+------------+----------------------+----------------------+
| EXTERNAL LAB: TSH    | Routin | 2019 |                      |   Results for this   |
|                      | e      |            |                      | procedure are in the |
|                      |        |            |                      |  results section.    |
+----------------------+--------+------------+----------------------+----------------------+
| EXTERNAL LAB:        | Routin | 2019 |                      |   Results for this   |
| TRIGLYCERIDES        | e      |            |                      | procedure are in the |
|                      |        |            |                      |  results section.    |
+----------------------+--------+------------+----------------------+----------------------+
| EXTERNAL LAB:        | Routin | 2019 |                      |   Results for this   |
| CHOLESTEROL, HDL     | e      |            |                      | procedure are in the |
|                      |        |            |                      |  results section.    |
+----------------------+--------+------------+----------------------+----------------------+
| EXTERNAL LAB:        | Routin | 2019 |                      |   Results for this   |
| CHOLESTEROL, TOTAL   | e      |            |                      | procedure are in the |
|                      |        |            |                      |  results section.    |
+----------------------+--------+------------+----------------------+----------------------+
| EXTERNAL LAB:        | Routin | 2019 |                      |   Results for this   |
| CHOLESTEROL, LDL     | e      |            |                      | procedure are in the |
|                      |        |            |                      |  results section.    |
+----------------------+--------+------------+----------------------+----------------------+
| EXTERNAL LAB: EGFR   | Routin | 2019 |                      |   Results for this   |
|                      | e      |            |                      | procedure are in the |
|                      |        |            |                      |  results section.    |
+----------------------+--------+------------+----------------------+----------------------+
| EXTERNAL LAB:        | Routin | 2019 |                      |   Results for this   |
| CREATININE           | e      |            |                      | procedure are in the |
|                      |        |            |                      |  results section.    |
+----------------------+--------+------------+----------------------+----------------------+
| HEMOGLOBIN A1C       | Routin | 2019 |                      |   Results for this   |
|                      | e      |            |                      | procedure are in the |
|                      |        |            |                      |  results section.    |
+----------------------+--------+------------+----------------------+----------------------+
 
 documented in this encounter
 
 Results
 Tacrolimus Trough, LC-MS/MS (2019)
 
+-------------+-------+-----------+------------+--------------+
| Component   | Value | Ref Range | Performed  | Pathologist  |
|             |       |           | At         | Signature    |
+-------------+-------+-----------+------------+--------------+
| Tacrolimus, | 5.5   |           |            |              |
|  LC-MS/MS,  |       |           |            |              |
| External    |       |           |            |              |
+-------------+-------+-----------+------------+--------------+
 
 
 
+----------+
| Specimen |
+----------+
| Blood    |
+----------+
 Hemoglobin A1C (2019)
 
+-------------+-------+-----------+------------+--------------+
| Component   | Value | Ref Range | Performed  | Pathologist  |
|             |       |           | At         | Signature    |
+-------------+-------+-----------+------------+--------------+
| Hemoglobin  | 6.4   | %         | EXTERNAL   |              |
| A1c         |       |           | LAB        |              |
+-------------+-------+-----------+------------+--------------+
 
 
 
+----------+
| Specimen |
+----------+
| Blood    |
+----------+
 
 
 
+----------------+---------+--------------------+--------------+
| Performing     | Address | City/State/Zipcode | Phone Number |
| Organization   |         |                    |              |
+----------------+---------+--------------------+--------------+
|   EXTERNAL LAB |         |                    |              |
+----------------+---------+--------------------+--------------+
 External Lab: BUN (2019)
 
+-----------+--------+-----------+------------+--------------+
| Component | Value  | Ref Range | Performed  | Pathologist  |
|           |        |           | At         | Signature    |
+-----------+--------+-----------+------------+--------------+
| BUN,      | 33 (A) | 6 - 23    | EXTERNAL   |              |
| External  |        |           | LAB        |              |
+-----------+--------+-----------+------------+--------------+
 
 
 
+----------------+---------+--------------------+--------------+
 
| Performing     | Address | City/State/Zipcode | Phone Number |
| Organization   |         |                    |              |
+----------------+---------+--------------------+--------------+
|   EXTERNAL LAB |         |                    |              |
+----------------+---------+--------------------+--------------+
 External Lab: Glucose (2019)
 
+-----------+-------+-----------+------------+--------------+
| Component | Value | Ref Range | Performed  | Pathologist  |
|           |       |           | At         | Signature    |
+-----------+-------+-----------+------------+--------------+
| Glucose,  | 94    | 70 - 100  | EXTERNAL   |              |
| External  |       |           | LAB        |              |
+-----------+-------+-----------+------------+--------------+
 
 
 
+----------------+---------+--------------------+--------------+
| Performing     | Address | City/State/Zipcode | Phone Number |
| Organization   |         |                    |              |
+----------------+---------+--------------------+--------------+
|   EXTERNAL LAB |         |                    |              |
+----------------+---------+--------------------+--------------+
 External Lab: ALT (2019)
 
+-----------+-------+-----------+------------+--------------+
| Component | Value | Ref Range | Performed  | Pathologist  |
|           |       |           | At         | Signature    |
+-----------+-------+-----------+------------+--------------+
| ALT,      | 18    | 7 - 52    | EXTERNAL   |              |
| External  |       |           | LAB        |              |
+-----------+-------+-----------+------------+--------------+
 
 
 
+----------------+---------+--------------------+--------------+
| Performing     | Address | City/State/Zipcode | Phone Number |
| Organization   |         |                    |              |
+----------------+---------+--------------------+--------------+
|   EXTERNAL LAB |         |                    |              |
+----------------+---------+--------------------+--------------+
 External Lab: AST (2019)
 
+-----------+-------+-----------+------------+--------------+
| Component | Value | Ref Range | Performed  | Pathologist  |
|           |       |           | At         | Signature    |
+-----------+-------+-----------+------------+--------------+
| AST,      | 32    | 13 - 39   | EXTERNAL   |              |
| External  |       |           | LAB        |              |
+-----------+-------+-----------+------------+--------------+
 
 
 
+----------------+---------+--------------------+--------------+
| Performing     | Address | City/State/Zipcode | Phone Number |
| Organization   |         |                    |              |
+----------------+---------+--------------------+--------------+
|   EXTERNAL LAB |         |                    |              |
+----------------+---------+--------------------+--------------+
 External Lab: Alkaline Phosphatase (2019)
 
 
+-----------+-------+-----------+------------+--------------+
| Component | Value | Ref Range | Performed  | Pathologist  |
|           |       |           | At         | Signature    |
+-----------+-------+-----------+------------+--------------+
| ALP,      | 72    | 31 - 130  | EXTERNAL   |              |
| External  |       |           | LAB        |              |
+-----------+-------+-----------+------------+--------------+
 
 
 
+----------------+---------+--------------------+--------------+
| Performing     | Address | City/State/Zipcode | Phone Number |
| Organization   |         |                    |              |
+----------------+---------+--------------------+--------------+
|   EXTERNAL LAB |         |                    |              |
+----------------+---------+--------------------+--------------+
 External Lab: Bilirubin, Total (2019)
 
+-------------+-------+-----------+------------+--------------+
| Component   | Value | Ref Range | Performed  | Pathologist  |
|             |       |           | At         | Signature    |
+-------------+-------+-----------+------------+--------------+
| Bilirubin,  | 0.7   | 0 - 1.2   | EXTERNAL   |              |
| Total,      |       |           | LAB        |              |
| External    |       |           |            |              |
+-------------+-------+-----------+------------+--------------+
 
 
 
+----------------+---------+--------------------+--------------+
| Performing     | Address | City/State/Zipcode | Phone Number |
| Organization   |         |                    |              |
+----------------+---------+--------------------+--------------+
|   EXTERNAL LAB |         |                    |              |
+----------------+---------+--------------------+--------------+
 External Lab: Albumin (2019)
 
+-----------+-------+-----------+------------+--------------+
| Component | Value | Ref Range | Performed  | Pathologist  |
|           |       |           | At         | Signature    |
+-----------+-------+-----------+------------+--------------+
| Albumin,  | 3.6   | 3.5 - 5   | EXTERNAL   |              |
| External  |       |           | LAB        |              |
+-----------+-------+-----------+------------+--------------+
 
 
 
+----------------+---------+--------------------+--------------+
| Performing     | Address | City/State/Zipcode | Phone Number |
| Organization   |         |                    |              |
+----------------+---------+--------------------+--------------+
|   EXTERNAL LAB |         |                    |              |
+----------------+---------+--------------------+--------------+
 External Lab: Protein, Total (2019)
 
+-----------+---------+-----------+------------+--------------+
| Component | Value   | Ref Range | Performed  | Pathologist  |
|           |         |           | At         | Signature    |
+-----------+---------+-----------+------------+--------------+
 
| Protein,  | 5.7 (A) | 6 - 8.3   | EXTERNAL   |              |
| Total,    |         |           | LAB        |              |
| External  |         |           |            |              |
+-----------+---------+-----------+------------+--------------+
 
 
 
+----------------+---------+--------------------+--------------+
| Performing     | Address | City/State/Zipcode | Phone Number |
| Organization   |         |                    |              |
+----------------+---------+--------------------+--------------+
|   EXTERNAL LAB |         |                    |              |
+----------------+---------+--------------------+--------------+
 External Lab: Phosphorus (2019)
 
+-------------+-------+-----------+------------+--------------+
| Component   | Value | Ref Range | Performed  | Pathologist  |
|             |       |           | At         | Signature    |
+-------------+-------+-----------+------------+--------------+
| Phosphorus, | 2.9   | 2.5 - 5   | EXTERNAL   |              |
|  External   |       |           | LAB        |              |
+-------------+-------+-----------+------------+--------------+
 
 
 
+----------------+---------+--------------------+--------------+
| Performing     | Address | City/State/Zipcode | Phone Number |
| Organization   |         |                    |              |
+----------------+---------+--------------------+--------------+
|   EXTERNAL LAB |         |                    |              |
+----------------+---------+--------------------+--------------+
 External Lab: Magnesium (2019)
 
+-------------+-------+-----------+------------+--------------+
| Component   | Value | Ref Range | Performed  | Pathologist  |
|             |       |           | At         | Signature    |
+-------------+-------+-----------+------------+--------------+
| Magnesium,  | 2     | 1.7 - 2.5 | EXTERNAL   |              |
| External    |       |           | LAB        |              |
+-------------+-------+-----------+------------+--------------+
 
 
 
+----------------+---------+--------------------+--------------+
| Performing     | Address | City/State/Zipcode | Phone Number |
| Organization   |         |                    |              |
+----------------+---------+--------------------+--------------+
|   EXTERNAL LAB |         |                    |              |
+----------------+---------+--------------------+--------------+
 External Lab: Calcium (2019)
 
+-----------+----------+------------+------------+--------------+
| Component | Value    | Ref Range  | Performed  | Pathologist  |
|           |          |            | At         | Signature    |
+-----------+----------+------------+------------+--------------+
| Calcium,  | 10.4 (A) | 8.5 - 10.3 | EXTERNAL   |              |
| External  |          |            | LAB        |              |
+-----------+----------+------------+------------+--------------+
 
 
 
 
+----------------+---------+--------------------+--------------+
| Performing     | Address | City/State/Zipcode | Phone Number |
| Organization   |         |                    |              |
+----------------+---------+--------------------+--------------+
|   EXTERNAL LAB |         |                    |              |
+----------------+---------+--------------------+--------------+
 External Lab: Carbon Dioxide (2019)
 
+-----------+-------+-----------+------------+--------------+
| Component | Value | Ref Range | Performed  | Pathologist  |
|           |       |           | At         | Signature    |
+-----------+-------+-----------+------------+--------------+
| Carbon    | 21    | 19 - 31   | EXTERNAL   |              |
| Dioxide,  |       |           | LAB        |              |
| External  |       |           |            |              |
+-----------+-------+-----------+------------+--------------+
 
 
 
+----------------+---------+--------------------+--------------+
| Performing     | Address | City/State/Zipcode | Phone Number |
| Organization   |         |                    |              |
+----------------+---------+--------------------+--------------+
|   EXTERNAL LAB |         |                    |              |
+----------------+---------+--------------------+--------------+
 External Lab: Chloride (2019)
 
+------------+-------+-----------+------------+--------------+
| Component  | Value | Ref Range | Performed  | Pathologist  |
|            |       |           | At         | Signature    |
+------------+-------+-----------+------------+--------------+
| Chloride,  | 111   | 95 - 112  | EXTERNAL   |              |
| External   |       |           | LAB        |              |
+------------+-------+-----------+------------+--------------+
 
 
 
+----------------+---------+--------------------+--------------+
| Performing     | Address | City/State/Zipcode | Phone Number |
| Organization   |         |                    |              |
+----------------+---------+--------------------+--------------+
|   EXTERNAL LAB |         |                    |              |
+----------------+---------+--------------------+--------------+
 External Lab: Potassium (2019)
 
+-------------+-------+-----------+------------+--------------+
| Component   | Value | Ref Range | Performed  | Pathologist  |
|             |       |           | At         | Signature    |
+-------------+-------+-----------+------------+--------------+
| Potassium,  | 4.7   | 3.6 - 5.1 | EXTERNAL   |              |
| External    |       |           | LAB        |              |
+-------------+-------+-----------+------------+--------------+
 
 
 
+----------------+---------+--------------------+--------------+
| Performing     | Address | City/State/Zipcode | Phone Number |
| Organization   |         |                    |              |
+----------------+---------+--------------------+--------------+
 
|   EXTERNAL LAB |         |                    |              |
+----------------+---------+--------------------+--------------+
 External Lab: Sodium (2019)
 
+-----------+-------+-----------+------------+--------------+
| Component | Value | Ref Range | Performed  | Pathologist  |
|           |       |           | At         | Signature    |
+-----------+-------+-----------+------------+--------------+
| Sodium,   | 143   | 132 - 143 | EXTERNAL   |              |
| External  |       |           | LAB        |              |
+-----------+-------+-----------+------------+--------------+
 
 
 
+----------------+---------+--------------------+--------------+
| Performing     | Address | City/State/Zipcode | Phone Number |
| Organization   |         |                    |              |
+----------------+---------+--------------------+--------------+
|   EXTERNAL LAB |         |                    |              |
+----------------+---------+--------------------+--------------+
 External Lab: CBC (2019)
 
+-----------+----------+-----------+------------+--------------+
| Component | Value    | Ref Range | Performed  | Pathologist  |
|           |          |           | At         | Signature    |
+-----------+----------+-----------+------------+--------------+
| WBC,      | 4.8      | 4.5 - 11  | EXTERNAL   |              |
| External  |          |           | LAB        |              |
+-----------+----------+-----------+------------+--------------+
| HGB,      | 13       | 12 - 16   | EXTERNAL   |              |
| External  |          |           | LAB        |              |
+-----------+----------+-----------+------------+--------------+
| HCT,      | 40.3     | 35 - 45   | EXTERNAL   |              |
| External  |          |           | LAB        |              |
+-----------+----------+-----------+------------+--------------+
| PLT,      | 177      | 140 - 440 | EXTERNAL   |              |
| External  |          |           | LAB        |              |
+-----------+----------+-----------+------------+--------------+
| RBC,      | 3.75 (A) | 3.8 - 5.1 | EXTERNAL   |              |
| External  |          |           | LAB        |              |
+-----------+----------+-----------+------------+--------------+
| MCV,      | 107 (A)  | 81 - 99   | EXTERNAL   |              |
| External  |          |           | LAB        |              |
+-----------+----------+-----------+------------+--------------+
| RDW,      | 14.4     | 10.5 - 15 | EXTERNAL   |              |
| External  |          |           | LAB        |              |
+-----------+----------+-----------+------------+--------------+
 
 
 
+----------------+---------+--------------------+--------------+
| Performing     | Address | City/State/Zipcode | Phone Number |
| Organization   |         |                    |              |
+----------------+---------+--------------------+--------------+
|   EXTERNAL LAB |         |                    |              |
+----------------+---------+--------------------+--------------+
 External Lab: TSH (2019)
 
+-----------+-------+------------+------------+--------------+
| Component | Value | Ref Range  | Performed  | Pathologist  |
 
|           |       |            | At         | Signature    |
+-----------+-------+------------+------------+--------------+
| TSH,      | 1.69  | 0.27 - 4.2 | EXTERNAL   |              |
| External  |       |            | LAB        |              |
+-----------+-------+------------+------------+--------------+
 
 
 
+----------+
| Specimen |
+----------+
| Blood    |
+----------+
 
 
 
+----------------+---------+--------------------+--------------+
| Performing     | Address | City/State/Zipcode | Phone Number |
| Organization   |         |                    |              |
+----------------+---------+--------------------+--------------+
|   EXTERNAL LAB |         |                    |              |
+----------------+---------+--------------------+--------------+
 External Lab: Triglycerides (2019)
 
+-------------+-------+-----------+------------+--------------+
| Component   | Value | Ref Range | Performed  | Pathologist  |
|             |       |           | At         | Signature    |
+-------------+-------+-----------+------------+--------------+
| Triglycerid | 122   | 30 - 150  | EXTERNAL   |              |
| es,         |       |           | LAB        |              |
| External    |       |           |            |              |
+-------------+-------+-----------+------------+--------------+
 
 
 
+----------+
| Specimen |
+----------+
| Blood    |
+----------+
 
 
 
+----------------+---------+--------------------+--------------+
| Performing     | Address | City/State/Zipcode | Phone Number |
| Organization   |         |                    |              |
+----------------+---------+--------------------+--------------+
|   EXTERNAL LAB |         |                    |              |
+----------------+---------+--------------------+--------------+
 External Lab: Cholesterol, HDL (2019)
 
+-------------+-------+-----------+------------+--------------+
| Component   | Value | Ref Range | Performed  | Pathologist  |
|             |       |           | At         | Signature    |
+-------------+-------+-----------+------------+--------------+
| HDL         | 50.4  | 40 mg/dl  | EXTERNAL   |              |
| Cholesterol |       |           | LAB        |              |
| , External  |       |           |            |              |
+-------------+-------+-----------+------------+--------------+
 
 
 
 
+----------+
| Specimen |
+----------+
| Blood    |
+----------+
 
 
 
+----------------+---------+--------------------+--------------+
| Performing     | Address | City/State/Zipcode | Phone Number |
| Organization   |         |                    |              |
+----------------+---------+--------------------+--------------+
|   EXTERNAL LAB |         |                    |              |
+----------------+---------+--------------------+--------------+
 External Lab: Cholesterol, Total (2019)
 
+-------------+-------+-----------+------------+--------------+
| Component   | Value | Ref Range | Performed  | Pathologist  |
|             |       |           | At         | Signature    |
+-------------+-------+-----------+------------+--------------+
| Cholesterol | 113   | 200 mg/dl | EXTERNAL   |              |
| , Total,    |       |           | LAB        |              |
| External    |       |           |            |              |
+-------------+-------+-----------+------------+--------------+
 
 
 
+----------+
| Specimen |
+----------+
| Blood    |
+----------+
 
 
 
+----------------+---------+--------------------+--------------+
| Performing     | Address | City/State/Zipcode | Phone Number |
| Organization   |         |                    |              |
+----------------+---------+--------------------+--------------+
|   EXTERNAL LAB |         |                    |              |
+----------------+---------+--------------------+--------------+
 External Lab: Cholesterol, LDL (2019)
 
+-------------+-------+-----------+------------+--------------+
| Component   | Value | Ref Range | Performed  | Pathologist  |
|             |       |           | At         | Signature    |
+-------------+-------+-----------+------------+--------------+
| LDL         | 38    | 100       | EXTERNAL   |              |
| Cholesterol |       |           | LAB        |              |
| , Direct,   |       |           |            |              |
| External    |       |           |            |              |
+-------------+-------+-----------+------------+--------------+
 
 
 
+----------+
| Specimen |
+----------+
 
| Blood    |
+----------+
 
 
 
+----------------+---------+--------------------+--------------+
| Performing     | Address | City/State/Zipcode | Phone Number |
| Organization   |         |                    |              |
+----------------+---------+--------------------+--------------+
|   EXTERNAL LAB |         |                    |              |
+----------------+---------+--------------------+--------------+
 External Lab: eGFR (2019)
 
+-----------+-------+-----------+------------+--------------+
| Component | Value | Ref Range | Performed  | Pathologist  |
|           |       |           | At         | Signature    |
+-----------+-------+-----------+------------+--------------+
| eGFR,     | 41    |           | EXTERNAL   |              |
| External  |       |           | LAB        |              |
+-----------+-------+-----------+------------+--------------+
 
 
 
+----------+
| Specimen |
+----------+
| Blood    |
+----------+
 
 
 
+----------------+---------+--------------------+--------------+
| Performing     | Address | City/State/Zipcode | Phone Number |
| Organization   |         |                    |              |
+----------------+---------+--------------------+--------------+
|   EXTERNAL LAB |         |                    |              |
+----------------+---------+--------------------+--------------+
 External Lab: Creatinine (2019)
 
+-------------+----------+------------+------------+--------------+
| Component   | Value    | Ref Range  | Performed  | Pathologist  |
|             |          |            | At         | Signature    |
+-------------+----------+------------+------------+--------------+
| Creatinine, | 1.29 (A) | 0.7 - 1.18 | EXTERNAL   |              |
|  External   |          |            | LAB        |              |
+-------------+----------+------------+------------+--------------+
 
 
 
+----------+
| Specimen |
+----------+
| Blood    |
+----------+
 
 
 
+----------------+---------+--------------------+--------------+
| Performing     | Address | City/State/Zipcode | Phone Number |
| Organization   |         |                    |              |
 
+----------------+---------+--------------------+--------------+
|   EXTERNAL LAB |         |                    |              |
+----------------+---------+--------------------+--------------+
 documented in this encounter
 
 Visit Diagnoses
 Not on filedocumented in this encounter

## 2020-01-08 NOTE — XMS
Encounter Summary
  Created on: 2020
 
 Viviana Ricci
 External Reference #: [id number]
 : 47
 Sex: Female
 
 Demographics
 
 
+-----------------------+----------------------+
| Address               | [address]      |
|                       | [place]  [address] |
+-----------------------+----------------------+
| Home Phone            | [phone]      |
+-----------------------+----------------------+
| Preferred Language    | Unknown              |
+-----------------------+----------------------+
| Marital Status        | Single               |
+-----------------------+----------------------+
| Jehovah's witness Affiliation | [id number]                 |
+-----------------------+----------------------+
| Race                  | Unknown              |
+-----------------------+----------------------+
| Ethnic Group          | Unknown              |
+-----------------------+----------------------+
 
 
 Author
 
 
+--------------+--------------------------------------------+
| Author       | [facility]  |
|              | and [facility]                                |
+--------------+--------------------------------------------+
| Organization | [facility]  |
|              | and [place]                                |
+--------------+--------------------------------------------+
| Address      | Unknown                                    |
+--------------+--------------------------------------------+
| Phone        | Unavailable                                |
+--------------+--------------------------------------------+
 
 
 
 Support
 
 
+----------------+--------------+---------------------+-----------------+
| Name           | Relationship | Address             | Phone           |
+----------------+--------------+---------------------+-----------------+
| Ada/Ed Radhames | ECON         | [address]              | [phone] |
|                |              | [place]  |                 |
|                |              |  [address]              |                 |
+----------------+--------------+---------------------+-----------------+
 
 
 
 
 Care Team Providers
 
 
+------------------------+------+-----------------+
| Care Team Member Name  | Role | Phone           |
+------------------------+------+-----------------+
| Marzena Moser DO | PCP  | [phone] |
+------------------------+------+-----------------+
 
 
 
 Encounter Details
 
 
+--------+----------+---------------------+----------------------+-------------+
| Date   | Type     | Department          | Care Team            | Description |
+--------+----------+---------------------+----------------------+-------------+
| / | Abstract |   [facility]         |   Marzena Moser  |             |
| 2019   |          | NEPHROLOGY  [address]   | DO ETIENNE  [address]      |             |
|        |          | [address]   | [address]      |             |
|        |          | [place], [place]     | [place], [place]      |             |
|        |          | [address]          | [address]  [phone]  |             |
|        |          | [phone]        |  [phone] (Fax)  |             |
+--------+----------+---------------------+----------------------+-------------+
 
 
 
 Social History
 
 
+--------------+-------+-----------+--------+------+
| Tobacco Use  | Types | Packs/Day | Years  | Date |
|              |       |           | Used   |      |
+--------------+-------+-----------+--------+------+
| Never Smoker |       |           |        |      |
+--------------+-------+-----------+--------+------+
 
 
 
+---------------------+---+---+---+
| Smokeless Tobacco:  |   |   |   |
| Never Used          |   |   |   |
+---------------------+---+---+---+
 
 
 
+---------------------------------------------------------------+
| Comments: some second hand smoke exposure, but fairly minimal |
+---------------------------------------------------------------+
 
 
 
+-------------+-------------+---------+----------+
| Alcohol Use | Drinks/Week | oz/Week | Comments |
+-------------+-------------+---------+----------+
| No          |             |         |          |
+-------------+-------------+---------+----------+
 
 
 
 
+------------------+---------------+
| Sex Assigned at  | Date Recorded |
| Birth            |               |
+------------------+---------------+
| Not on file      |               |
+------------------+---------------+
 
 
 
+----------------+-------------+-------------+
| Job Start Date | Occupation  | Industry    |
+----------------+-------------+-------------+
| Not on file    | Not on file | Not on file |
+----------------+-------------+-------------+
 
 
 
+----------------+--------------+------------+
| Travel History | Travel Start | Travel End |
+----------------+--------------+------------+
 
 
 
+-------------------------------------+
| No recent travel history available. |
+-------------------------------------+
 documented as of this encounter
 
 Progress Notes
 Juju Glass RN - 2019  9:11 AM PDTTacrolimus level- 4.5
 
 Goal- 4-5
 
 Current dose- 1 mg twice daily
 
 Patient notified tacrolimus level within goal range and to continue current dose. She laureen knutson.Electronically signed by Juju Glass RN at 2019  9:17 AM PDTdo
cumented in this encounter
 
 Plan of Treatment
 
 
+--------+-----------+------------+----------------------+-------------------+
| Date   | Type      | Specialty  | Care Team            | Description       |
+--------+-----------+------------+----------------------+-------------------+
| / | Office    | Cardiology |   Tonia Wilson,    |                   |
|    | Visit     |            | [address]   |                   |
|        |           |            | St  [place], [place]  |                   |
|        |           |            | [address]  [phone]  |                   |
|        |           |            |  [phone] (Fax)  |                   |
+--------+-----------+------------+----------------------+-------------------+
| / | Hospital  | Radiology  |   Popeye Townsend, |                   |
|    | Encounter |            |  MD  [address] |                   |
|        |           |            |  St.  [place],   |                   |
|        |           |            | [place] [address]             |                   |
|        |           |            | [phone]         |                   |
|        |           |            | [phone] (Fax)   |                   |
+--------+-----------+------------+----------------------+-------------------+
| / | Surgery   | Radiology  |   Popeye Townsend, | CV EP PPM SYSTEM  |
 
|    |           |            |  MD  [address] | IMPLANT           |
|        |           |            |  St.  [place],   |                   |
|        |           |            | [place] [address]             |                   |
|        |           |            | [phone]         |                   |
|        |           |            | [phone] (Fax)   |                   |
+--------+-----------+------------+----------------------+-------------------+
| 02/10/ | Clinical  | Cardiology |                      |                   |
|    | Support   |            |                      |                   |
+--------+-----------+------------+----------------------+-------------------+
| / | Office    | Cardiology |   Tonia Wilson,    |                   |
|    | Visit     |            | [facility]  [address]   |                   |
|        |           |            | [place]  |                   |
|        |           |            | [address]  [phone]  |                   |
|        |           |            |  [phone] (Fax)  |                   |
+--------+-----------+------------+----------------------+-------------------+
| / | Off-Site  | Nephrology |   Marzena Moser  |                   |
|    | Visit     |            | DO ETIENNE  [address] [place] [address]      |                   |
|        |           |            | [address]      |                   |
|        |           |            | [place]      |                   |
|        |           |            | [address]  [phone]  |                   |
|        |           |            |  [phone] (Fax)  |                   |
+--------+-----------+------------+----------------------+-------------------+
 documented as of this encounter
 
 Procedures
 
 
+--------------------+--------+------------+----------------------+----------------------+
| Procedure Name     | Priori | Date/Time  | Associated Diagnosis | Comments             |
|                    | ty     |            |                      |                      |
+--------------------+--------+------------+----------------------+----------------------+
| TACROLIMUS TROUGH  | Routin | 2019 |                      |   Results for this   |
| LC-MS/MS           | e      |            |                      | procedure are in the |
|                    |        |            |                      |  results section.    |
+--------------------+--------+------------+----------------------+----------------------+
 documented in this encounter
 
 Results
 Tacrolimus Trough, LC-MS/MS (2019)
 
+-------------+-------+-----------+------------+--------------+
| Component   | Value | Ref Range | Performed  | Pathologist  |
|             |       |           | At         | Signature    |
+-------------+-------+-----------+------------+--------------+
| Tacrolimus, | 4.5   |           |            |              |
|  LC-MS/MS,  |       |           |            |              |
| External    |       |           |            |              |
+-------------+-------+-----------+------------+--------------+
 
 
 
+----------+
| Specimen |
+----------+
| Blood    |
+----------+
 documented in this encounter
 
 Visit Diagnoses
 Not on filedocumented in this encounter

## 2020-01-08 NOTE — XMS
Encounter Summary
  Created on: 2020
 
 Vviiana Ricci
 External Reference #: [id number]
 : 47
 Sex: Female
 
 Demographics
 
 
+-----------------------+----------------------+
| Address               | [address]      |
|                       | [place]  [address] |
+-----------------------+----------------------+
| Home Phone            | [phone]      |
+-----------------------+----------------------+
| Preferred Language    | Unknown              |
+-----------------------+----------------------+
| Marital Status        | Single               |
+-----------------------+----------------------+
| Zoroastrianism Affiliation | 1009                 |
+-----------------------+----------------------+
| Race                  | Unknown              |
+-----------------------+----------------------+
| Ethnic Group          | Unknown              |
+-----------------------+----------------------+
 
 
 Author
 
 
+--------------+--------------------------------------------+
| Author       | [facility]  |
|              | and [place]                                |
+--------------+--------------------------------------------+
| Organization | [facility]  |
|              | and [place]                                |
+--------------+--------------------------------------------+
| Address      | Unknown                                    |
+--------------+--------------------------------------------+
| Phone        | Unavailable                                |
+--------------+--------------------------------------------+
 
 
 
 Support
 
 
+----------------+--------------+---------------------+-----------------+
| Name           | Relationship | Address             | Phone           |
+----------------+--------------+---------------------+-----------------+
| Ada/Ed Radhames | ECON         | [address]              | [phone] |
|                |              | [place]  |                 |
|                |              |  [address]              |                 |
+----------------+--------------+---------------------+-----------------+
 
 
 
 
 Care Team Providers
 
 
+-----------------------+------+-------------+
| Care Team Member Name | Role | Phone       |
+-----------------------+------+-------------+
 PCP  | Unavailable |
+-----------------------+------+-------------+
 
 
 
 Reason for Visit
 
 
+-------------------+----------+
| Reason            | Comments |
+-------------------+----------+
| Medication Refill |          |
+-------------------+----------+
 
 
 
 Encounter Details
 
 
+--------+--------+---------------------+----------------------+-------------------+
| Date   | Type   | Department          | Care Team            | Description       |
+--------+--------+---------------------+----------------------+-------------------+
| / | Refill |   [facility]         |   Marzena Moser  | Medication Refill |
| 2017   |        | NEPHROLOGY  [address]   | M, DO  [address]      |                   |
|        |        | [address]   | [address]      |                   |
|        |        | [place], [place]     | [place], [place]      |                   |
|        |        | [address]          | [address]  [phone]  |                   |
|        |        | [phone]        |  [phone] (Fax)  |                   |
+--------+--------+---------------------+----------------------+-------------------+
 
 
 
 Social History
 
 
+--------------+-------+-----------+--------+------+
| Tobacco Use  | Types | Packs/Day | Years  | Date |
|              |       |           | Used   |      |
+--------------+-------+-----------+--------+------+
| Never Smoker |       |           |        |      |
+--------------+-------+-----------+--------+------+
 
 
 
+---------------------+---+---+---+
| Smokeless Tobacco:  |   |   |   |
| Never Used          |   |   |   |
+---------------------+---+---+---+
 
 
 
+---------------------------------------------------------------+
| Comments: some second hand smoke exposure, but fairly minimal |
 
+---------------------------------------------------------------+
 
 
 
+-------------+-------------+---------+----------+
| Alcohol Use | Drinks/Week | oz/Week | Comments |
+-------------+-------------+---------+----------+
| No          |             |         |          |
+-------------+-------------+---------+----------+
 
 
 
+------------------+---------------+
| Sex Assigned at  | Date Recorded |
| Birth            |               |
+------------------+---------------+
| Not on file      |               |
+------------------+---------------+
 
 
 
+----------------+-------------+-------------+
| Job Start Date | Occupation  | Industry    |
+----------------+-------------+-------------+
| Not on file    | Not on file | Not on file |
+----------------+-------------+-------------+
 
 
 
+----------------+--------------+------------+
| Travel History | Travel Start | Travel End |
+----------------+--------------+------------+
 
 
 
+-------------------------------------+
| No recent travel history available. |
+-------------------------------------+
 documented as of this encounter
 
 Plan of Treatment
 
 
+--------+-----------+------------+----------------------+-------------------+
| Date   | Type      | Specialty  | Care Team            | Description       |
+--------+-----------+------------+----------------------+-------------------+
| / | Office    | Cardiology |   HellalfredoTonia,    |                   |
|    | Visit     |            | ARNP  [address]   |                   |
|        |           |            | St  [place], [place]  |                   |
|        |           |            | [address]  [phone]  |                   |
|        |           |            |  [phone] (Fax)  |                   |
+--------+-----------+------------+----------------------+-------------------+
| / | Hospital  | Radiology  |   Popeye Townsend, |                   |
|    | Encounter |            |  MD  [address] |                   |
|        |           |            |  St.  [place],   |                   |
|        |           |            | [place] [address]             |                   |
|        |           |            | [phone]         |                   |
|        |           |            | [phone] (Fax)   |                   |
+--------+-----------+------------+----------------------+-------------------+
| / | Surgery   | Radiology  |   Popeye Townsend, | CV EP PPM SYSTEM  |
 
|    |           |            |  MD  [address] | IMPLANT           |
|        |           |            |  St.  [place],   |                   |
|        |           |            | [place] [address]             |                   |
|        |           |            | [phone]         |                   |
|        |           |            | [phone] (Fax)   |                   |
+--------+-----------+------------+----------------------+-------------------+
| 02/10/ | Clinical  | Cardiology |                      |                   |
|    | Support   |            |                      |                   |
+--------+-----------+------------+----------------------+-------------------+
| / | Office    | Cardiology |   Tonia Wilson,    |                   |
|    | Visit     |            | ARNP  [address]   |                   |
|        |           |            | [place]  |                   |
|        |           |            | [address]  [phone]  |                   |
|        |           |            |  [phone] (Fax)  |                   |
+--------+-----------+------------+----------------------+-------------------+
| / | Off-Site  | Nephrology |   Marzena Moser  |                   |
|    | Visit     |            | DO ETIENNE  [address] [place] [address]      |                   |
|        |           |            | [address]      |                   |
|        |           |            | [place]      |                   |
|        |           |            | [address]  [phone]  |                   |
|        |           |            |  [phone] (Fax)  |                   |
+--------+-----------+------------+----------------------+-------------------+
 documented as of this encounter
 
 Visit Diagnoses
 Not on filedocumented in this encounter

## 2020-01-08 NOTE — XMS
Encounter Summary
  Created on: 2020
 
 Viviana Ricci
 External Reference #: [id number]
 : 47
 Sex: Female
 
 Demographics
 
 
+-----------------------+----------------------+
| Address               | [address]      |
|                       | [place]  [address] |
+-----------------------+----------------------+
| Home Phone            | [phone]      |
+-----------------------+----------------------+
| Preferred Language    | Unknown              |
+-----------------------+----------------------+
| Marital Status        | Single               |
+-----------------------+----------------------+
| Mosque Affiliation | [id number]                 |
+-----------------------+----------------------+
| Race                  | Unknown              |
+-----------------------+----------------------+
| Ethnic Group          | Unknown              |
+-----------------------+----------------------+
 
 
 Author
 
 
+--------------+--------------------------------------------+
| Author       | [facility]  |
|              | and [facility]                                |
+--------------+--------------------------------------------+
| Organization | [facility]  |
|              | and [facility]                                |
+--------------+--------------------------------------------+
| Address      | Unknown                                    |
+--------------+--------------------------------------------+
| Phone        | Unavailable                                |
+--------------+--------------------------------------------+
 
 
 
 Support
 
 
+----------------+--------------+---------------------+-----------------+
| Name           | Relationship | Address             | Phone           |
+----------------+--------------+---------------------+-----------------+
| Ada/Ed Radhames | ECON         | [address]              | [phone] |
|                |              | [place]  |                 |
|                |              |  [address]              |                 |
+----------------+--------------+---------------------+-----------------+
 
 
 
 
 Care Team Providers
 
 
+-----------------------+------+-------------+
| Care Team Member Name | Role | Phone       |
+-----------------------+------+-------------+
 PCP  | Unavailable |
+-----------------------+------+-------------+
 
 
 
 Encounter Details
 
 
+--------+----------+---------------------+----------------------+-------------+
| Date   | Type     | Department          | Care Team            | Description |
+--------+----------+---------------------+----------------------+-------------+
| / | Abstract |   [facility] [place]         |   Marzena Moser  |             |
|    |          | NEPHROLOGY  [address]   | M, DO  [address]      |             |
|        |          | [address]   | [address]      |             |
|        |          | [place], [place]     | [place]      |             |
|        |          | [address]          | [address]  [phone]  |             |
|        |          | [phone]        |  [phone] (Fax)  |             |
+--------+----------+---------------------+----------------------+-------------+
 
 
 
 Social History
 
 
+--------------+-------+-----------+--------+------+
| Tobacco Use  | Types | Packs/Day | Years  | Date |
|              |       |           | Used   |      |
+--------------+-------+-----------+--------+------+
| Never Smoker |       |           |        |      |
+--------------+-------+-----------+--------+------+
 
 
 
+---------------------+---+---+---+
| Smokeless Tobacco:  |   |   |   |
| Never Used          |   |   |   |
+---------------------+---+---+---+
 
 
 
+---------------------------------------------------------------+
| Comments: some second hand smoke exposure, but fairly minimal |
+---------------------------------------------------------------+
 
 
 
+-------------+-------------+---------+----------+
| Alcohol Use | Drinks/Week | oz/Week | Comments |
+-------------+-------------+---------+----------+
| No          |             |         |          |
+-------------+-------------+---------+----------+
 
 
 
 
+------------------+---------------+
| Sex Assigned at  | Date Recorded |
| Birth            |               |
+------------------+---------------+
| Not on file      |               |
+------------------+---------------+
 
 
 
+----------------+-------------+-------------+
| Job Start Date | Occupation  | Industry    |
+----------------+-------------+-------------+
| Not on file    | Not on file | Not on file |
+----------------+-------------+-------------+
 
 
 
+----------------+--------------+------------+
| Travel History | Travel Start | Travel End |
+----------------+--------------+------------+
 
 
 
+-------------------------------------+
| No recent travel history available. |
+-------------------------------------+
 documented as of this encounter
 
 Plan of Treatment
 
 
+--------+-----------+------------+----------------------+-------------------+
| Date   | Type      | Specialty  | Care Team            | Description       |
+--------+-----------+------------+----------------------+-------------------+
| / | Office    | Cardiology |   Tonia Wilson,    |                   |
|    | Visit     |            | ARNJESSICA  [address]   |                   |
|        |           |            | [place]  |                   |
|        |           |            | [address]  [phone]  |                   |
|        |           |            |  [phone] (Fax)  |                   |
+--------+-----------+------------+----------------------+-------------------+
| / | Hospital  | Radiology  |   Popeye Townsend, |                   |
|    | Encounter |            |  MD  [address] |                   |
|        |           |            |  St.  [place],   |                   |
|        |           |            | [place] [address]             |                   |
|        |           |            | [phone]         |                   |
|        |           |            | [phone] (Fax)   |                   |
+--------+-----------+------------+----------------------+-------------------+
| / | Surgery   | Radiology  |   Popeye Townsend, | CV EP PPM SYSTEM  |
|    |           |            |  MD  [address] | IMPLANT           |
|        |           |            |  St.  [place],   |                   |
|        |           |            | [place] [address]             |                   |
|        |           |            | [phone]         |                   |
|        |           |            | [phone] (Fax)   |                   |
+--------+-----------+------------+----------------------+-------------------+
| 02/10/ | Clinical  | Cardiology |                      |                   |
|    | Support   |            |                      |                   |
+--------+-----------+------------+----------------------+-------------------+
| / | Office    | Cardiology |   Tonia Wilson,    |                   |
|    | Visit     |            | ARNP  [address]   |                   |
 
|        |           |            | St  [place], [place]  |                   |
|        |           |            | [address]  [phone]  |                   |
|        |           |            |  [phone] (Fax)  |                   |
+--------+-----------+------------+----------------------+-------------------+
| / | Off-Site  | Nephrology |   Marzena Moser  |                   |
|    | Visit     |            | DO ETIENNE  [address] [place] [address]      |                   |
|        |           |            | [address]      |                   |
|        |           |            | [place]      |                   |
|        |           |            | [address]  [phone]  |                   |
|        |           |            |  [phone] (Fax)  |                   |
+--------+-----------+------------+----------------------+-------------------+
 documented as of this encounter
 
 Procedures
 
 
+----------------------+--------+------------+----------------------+----------------------+
| Procedure Name       | Priori | Date/Time  | Associated Diagnosis | Comments             |
|                      | ty     |            |                      |                      |
+----------------------+--------+------------+----------------------+----------------------+
| EXTERNAL LAB: BUN    | Routin | 2016 |                      |   Results for this   |
|                      | e      |            |                      | procedure are in the |
|                      |        |            |                      |  results section.    |
+----------------------+--------+------------+----------------------+----------------------+
| EXTERNAL LAB:        | Routin | 2016 |                      |   Results for this   |
| GLUCOSE              | e      |            |                      | procedure are in the |
|                      |        |            |                      |  results section.    |
+----------------------+--------+------------+----------------------+----------------------+
| EXTERNAL LAB: ALT    | Routin | 2016 |                      |   Results for this   |
|                      | e      |            |                      | procedure are in the |
|                      |        |            |                      |  results section.    |
+----------------------+--------+------------+----------------------+----------------------+
| EXTERNAL LAB: AST    | Routin | 2016 |                      |   Results for this   |
|                      | e      |            |                      | procedure are in the |
|                      |        |            |                      |  results section.    |
+----------------------+--------+------------+----------------------+----------------------+
| EXTERNAL LAB:        | Routin | 2016 |                      |   Results for this   |
| ALKALINE PHOSPHATASE | e      |            |                      | procedure are in the |
|                      |        |            |                      |  results section.    |
+----------------------+--------+------------+----------------------+----------------------+
| EXTERNAL LAB:        | Routin | 2016 |                      |   Results for this   |
| BILIRUBIN, TOTAL     | e      |            |                      | procedure are in the |
|                      |        |            |                      |  results section.    |
+----------------------+--------+------------+----------------------+----------------------+
| EXTERNAL LAB:        | Routin | 2016 |                      |   Results for this   |
| ALBUMIN              | e      |            |                      | procedure are in the |
|                      |        |            |                      |  results section.    |
+----------------------+--------+------------+----------------------+----------------------+
| EXTERNAL LAB:        | Routin | 2016 |                      |   Results for this   |
| PROTEIN, TOTAL       | e      |            |                      | procedure are in the |
|                      |        |            |                      |  results section.    |
+----------------------+--------+------------+----------------------+----------------------+
| EXTERNAL LAB:        | Routin | 2016 |                      |   Results for this   |
| MAGNESIUM            | e      |            |                      | procedure are in the |
|                      |        |            |                      |  results section.    |
+----------------------+--------+------------+----------------------+----------------------+
| EXTERNAL LAB:        | Routin | 2016 |                      |   Results for this   |
| CALCIUM              | e      |            |                      | procedure are in the |
|                      |        |            |                      |  results section.    |
+----------------------+--------+------------+----------------------+----------------------+
 
| EXTERNAL LAB: CARBON | Routin | 2016 |                      |   Results for this   |
|  DIOXIDE             | e      |            |                      | procedure are in the |
|                      |        |            |                      |  results section.    |
+----------------------+--------+------------+----------------------+----------------------+
| EXTERNAL LAB:        | Routin | 2016 |                      |   Results for this   |
| CHLORIDE             | e      |            |                      | procedure are in the |
|                      |        |            |                      |  results section.    |
+----------------------+--------+------------+----------------------+----------------------+
| EXTERNAL LAB:        | Routin | 2016 |                      |   Results for this   |
| POTASSIUM            | e      |            |                      | procedure are in the |
|                      |        |            |                      |  results section.    |
+----------------------+--------+------------+----------------------+----------------------+
| EXTERNAL LAB: SODIUM | Routin | 2016 |                      |   Results for this   |
|                      | e      |            |                      | procedure are in the |
|                      |        |            |                      |  results section.    |
+----------------------+--------+------------+----------------------+----------------------+
| EXTERNAL LAB: EVY   | Routin | 2016 |                      |   Results for this   |
| INTACT               | e      |            |                      | procedure are in the |
|                      |        |            |                      |  results section.    |
+----------------------+--------+------------+----------------------+----------------------+
| EXTERNAL LAB: BILLY    | Routin | 2016 |                      |   Results for this   |
|                      | e      |            |                      | procedure are in the |
|                      |        |            |                      |  results section.    |
+----------------------+--------+------------+----------------------+----------------------+
| EXTERNAL LAB:        | Routin | 2016 |                      |   Results for this   |
| TRIGLYCERIDES        | e      |            |                      | procedure are in the |
|                      |        |            |                      |  results section.    |
+----------------------+--------+------------+----------------------+----------------------+
| EXTERNAL LAB:        | Routin | 2016 |                      |   Results for this   |
| CHOLESTEROL, HDL     | e      |            |                      | procedure are in the |
|                      |        |            |                      |  results section.    |
+----------------------+--------+------------+----------------------+----------------------+
| EXTERNAL LAB:        | Routin | 2016 |                      |   Results for this   |
| CHOLESTEROL, TOTAL   | e      |            |                      | procedure are in the |
|                      |        |            |                      |  results section.    |
+----------------------+--------+------------+----------------------+----------------------+
| EXTERNAL LAB:        | Routin | 2016 |                      |   Results for this   |
| CHOLESTEROL, LDL     | e      |            |                      | procedure are in the |
| DIRECT               |        |            |                      |  results section.    |
+----------------------+--------+------------+----------------------+----------------------+
| EXTERNAL LAB: EGFR   | Routin | 2016 |                      |   Results for this   |
|                      | e      |            |                      | procedure are in the |
|                      |        |            |                      |  results section.    |
+----------------------+--------+------------+----------------------+----------------------+
| EXTERNAL LAB:        | Routin | 2016 |                      |   Results for this   |
| CREATININE           | e      |            |                      | procedure are in the |
|                      |        |            |                      |  results section.    |
+----------------------+--------+------------+----------------------+----------------------+
| HEMOGLOBIN A1C       | Routin | 2016 |                      |   Results for this   |
|                      | e      |            |                      | procedure are in the |
|                      |        |            |                      |  results section.    |
+----------------------+--------+------------+----------------------+----------------------+
 documented in this encounter
 
 Results
 External Lab: PTH, Intact (2016)
 
+-------------+-----------+-----------+------------+--------------+
| Component   | Value     | Ref Range | Performed  | Pathologist  |
|             |           |           | At         | Signature    |
 
+-------------+-----------+-----------+------------+--------------+
| PTH Intact, | 204.9 (A) | 15 - 65   |            |              |
|  External   |           |           |            |              |
+-------------+-----------+-----------+------------+--------------+
 
 
 
+----------+
| Specimen |
+----------+
|          |
+----------+
 Hemoglobin A1C (2016)
 
+-------------+-------+-----------+------------+--------------+
| Component   | Value | Ref Range | Performed  | Pathologist  |
|             |       |           | At         | Signature    |
+-------------+-------+-----------+------------+--------------+
| Hemoglobin  | 7.8   |           | EXTERNAL   |              |
| A1c,        |       |           | LAB        |              |
| external    |       |           |            |              |
+-------------+-------+-----------+------------+--------------+
 
 
 
+-----------------+
| Specimen        |
+-----------------+
| Blood specimen  |
| (specimen)      |
+-----------------+
 
 
 
+--------------------------+
| Resulting Agency Comment |
+--------------------------+
|   [facility]              |
+--------------------------+
 
 
 
+----------------+---------+--------------------+--------------+
| Performing     | Address | City/State/Zipcode | Phone Number |
| Organization   |         |                    |              |
+----------------+---------+--------------------+--------------+
|   EXTERNAL LAB |         |                    |              |
+----------------+---------+--------------------+--------------+
 External Lab: BUN (2016)
 
+-----------+--------+-----------+------------+--------------+
| Component | Value  | Ref Range | Performed  | Pathologist  |
|           |        |           | At         | Signature    |
+-----------+--------+-----------+------------+--------------+
| BUN,      | 29 (A) | 6 - 23    | EXTERNAL   |              |
| External  |        |           | LAB        |              |
+-----------+--------+-----------+------------+--------------+
 
 
 
 
+--------------------------+
| Resulting Agency Comment |
+--------------------------+
|   [facility]              |
+--------------------------+
 
 
 
+----------------+---------+--------------------+--------------+
| Performing     | Address | City/State/Zipcode | Phone Number |
| Organization   |         |                    |              |
+----------------+---------+--------------------+--------------+
|   EXTERNAL LAB |         |                    |              |
+----------------+---------+--------------------+--------------+
 External Lab: Glucose (2016)
 
+-----------+-------+-----------+------------+--------------+
| Component | Value | Ref Range | Performed  | Pathologist  |
|           |       |           | At         | Signature    |
+-----------+-------+-----------+------------+--------------+
| Glucose,  | 150   | 70 - 150  | EXTERNAL   |              |
| External  |       |           | LAB        |              |
+-----------+-------+-----------+------------+--------------+
 
 
 
+--------------------------+
| Resulting Agency Comment |
+--------------------------+
|   [facility]              |
+--------------------------+
 
 
 
+----------------+---------+--------------------+--------------+
| Performing     | Address | City/State/Zipcode | Phone Number |
| Organization   |         |                    |              |
+----------------+---------+--------------------+--------------+
|   EXTERNAL LAB |         |                    |              |
+----------------+---------+--------------------+--------------+
 External Lab: ALT (2016)
 
+-----------+-------+-----------+------------+--------------+
| Component | Value | Ref Range | Performed  | Pathologist  |
|           |       |           | At         | Signature    |
+-----------+-------+-----------+------------+--------------+
| ALT,      | 17    | 7 - 52    | EXTERNAL   |              |
| External  |       |           | LAB        |              |
+-----------+-------+-----------+------------+--------------+
 
 
 
+--------------------------+
| Resulting Agency Comment |
+--------------------------+
|   [facility]              |
+--------------------------+
 
 
 
 
+----------------+---------+--------------------+--------------+
| Performing     | Address | City/State/Zipcode | Phone Number |
| Organization   |         |                    |              |
+----------------+---------+--------------------+--------------+
|   EXTERNAL LAB |         |                    |              |
+----------------+---------+--------------------+--------------+
 External Lab: AST (2016)
 
+-----------+-------+-----------+------------+--------------+
| Component | Value | Ref Range | Performed  | Pathologist  |
|           |       |           | At         | Signature    |
+-----------+-------+-----------+------------+--------------+
| AST,      | 24    | 13 - 39   | EXTERNAL   |              |
| External  |       |           | LAB        |              |
+-----------+-------+-----------+------------+--------------+
 
 
 
+--------------------------+
| Resulting Agency Comment |
+--------------------------+
|   [facility]              |
+--------------------------+
 
 
 
+----------------+---------+--------------------+--------------+
| Performing     | Address | City/State/Zipcode | Phone Number |
| Organization   |         |                    |              |
+----------------+---------+--------------------+--------------+
|   EXTERNAL LAB |         |                    |              |
+----------------+---------+--------------------+--------------+
 External Lab: Alkaline Phosphatase (2016)
 
+-----------+-------+-----------+------------+--------------+
| Component | Value | Ref Range | Performed  | Pathologist  |
|           |       |           | At         | Signature    |
+-----------+-------+-----------+------------+--------------+
| ALP,      | 115   | 30 - 128  | EXTERNAL   |              |
| External  |       |           | LAB        |              |
+-----------+-------+-----------+------------+--------------+
 
 
 
+--------------------------+
| Resulting Agency Comment |
+--------------------------+
|   [facility]              |
+--------------------------+
 
 
 
+----------------+---------+--------------------+--------------+
| Performing     | Address | City/State/Zipcode | Phone Number |
| Organization   |         |                    |              |
+----------------+---------+--------------------+--------------+
|   EXTERNAL LAB |         |                    |              |
+----------------+---------+--------------------+--------------+
 External Lab: Bilirubin, Total (2016)
 
 
+-------------+-------+-----------+------------+--------------+
| Component   | Value | Ref Range | Performed  | Pathologist  |
|             |       |           | At         | Signature    |
+-------------+-------+-----------+------------+--------------+
| Bilirubin,  | 0.6   | 0 - 1.2   | EXTERNAL   |              |
| Total,      |       |           | LAB        |              |
| External    |       |           |            |              |
+-------------+-------+-----------+------------+--------------+
 
 
 
+--------------------------+
| Resulting Agency Comment |
+--------------------------+
|   [facility]              |
+--------------------------+
 
 
 
+----------------+---------+--------------------+--------------+
| Performing     | Address | City/State/Zipcode | Phone Number |
| Organization   |         |                    |              |
+----------------+---------+--------------------+--------------+
|   EXTERNAL LAB |         |                    |              |
+----------------+---------+--------------------+--------------+
 External Lab: Albumin (2016)
 
+-----------+-------+-----------+------------+--------------+
| Component | Value | Ref Range | Performed  | Pathologist  |
|           |       |           | At         | Signature    |
+-----------+-------+-----------+------------+--------------+
| Albumin,  | 3.5   | 3.5 - 5   | EXTERNAL   |              |
| External  |       |           | LAB        |              |
+-----------+-------+-----------+------------+--------------+
 
 
 
+--------------------------+
| Resulting Agency Comment |
+--------------------------+
|   [facility]              |
+--------------------------+
 
 
 
+----------------+---------+--------------------+--------------+
| Performing     | Address | City/State/Zipcode | Phone Number |
| Organization   |         |                    |              |
+----------------+---------+--------------------+--------------+
|   EXTERNAL LAB |         |                    |              |
+----------------+---------+--------------------+--------------+
 External Lab: Protein, Total (2016)
 
+-----------+---------+-----------+------------+--------------+
| Component | Value   | Ref Range | Performed  | Pathologist  |
|           |         |           | At         | Signature    |
+-----------+---------+-----------+------------+--------------+
| Protein,  | 5.7 (A) | 6 - 8     | EXTERNAL   |              |
| Total,    |         |           | LAB        |              |
| External  |         |           |            |              |
 
+-----------+---------+-----------+------------+--------------+
 
 
 
+--------------------------+
| Resulting Agency Comment |
+--------------------------+
|   [facility]              |
+--------------------------+
 
 
 
+----------------+---------+--------------------+--------------+
| Performing     | Address | City/State/Zipcode | Phone Number |
| Organization   |         |                    |              |
+----------------+---------+--------------------+--------------+
|   EXTERNAL LAB |         |                    |              |
+----------------+---------+--------------------+--------------+
 External Lab: Magnesium (2016)
 
+-------------+---------+-----------+------------+--------------+
| Component   | Value   | Ref Range | Performed  | Pathologist  |
|             |         |           | At         | Signature    |
+-------------+---------+-----------+------------+--------------+
| Magnesium,  | 1.6 (A) | 1.7 - 2.5 | EXTERNAL   |              |
| External    |         |           | LAB        |              |
+-------------+---------+-----------+------------+--------------+
 
 
 
+--------------------------+
| Resulting Agency Comment |
+--------------------------+
|   [facility]              |
+--------------------------+
 
 
 
+----------------+---------+--------------------+--------------+
| Performing     | Address | City/State/Zipcode | Phone Number |
| Organization   |         |                    |              |
+----------------+---------+--------------------+--------------+
|   EXTERNAL LAB |         |                    |              |
+----------------+---------+--------------------+--------------+
 External Lab: Calcium (2016)
 
+-----------+-------+------------+------------+--------------+
| Component | Value | Ref Range  | Performed  | Pathologist  |
|           |       |            | At         | Signature    |
+-----------+-------+------------+------------+--------------+
| Calcium,  | 9.4   | 8.4 - 10.2 | EXTERNAL   |              |
| External  |       |            | LAB        |              |
+-----------+-------+------------+------------+--------------+
 
 
 
+--------------------------+
| Resulting Agency Comment |
+--------------------------+
|   [facility]              |
 
+--------------------------+
 
 
 
+----------------+---------+--------------------+--------------+
| Performing     | Address | City/State/Zipcode | Phone Number |
| Organization   |         |                    |              |
+----------------+---------+--------------------+--------------+
|   EXTERNAL LAB |         |                    |              |
+----------------+---------+--------------------+--------------+
 External Lab: Carbon Dioxide (2016)
 
+-----------+-------+-----------+------------+--------------+
| Component | Value | Ref Range | Performed  | Pathologist  |
|           |       |           | At         | Signature    |
+-----------+-------+-----------+------------+--------------+
| Carbon    | 21    | 19 - 31   | EXTERNAL   |              |
| Dioxide,  |       |           | LAB        |              |
| External  |       |           |            |              |
+-----------+-------+-----------+------------+--------------+
 
 
 
+--------------------------+
| Resulting Agency Comment |
+--------------------------+
|   [facility]              |
+--------------------------+
 
 
 
+----------------+---------+--------------------+--------------+
| Performing     | Address | City/State/Zipcode | Phone Number |
| Organization   |         |                    |              |
+----------------+---------+--------------------+--------------+
|   EXTERNAL LAB |         |                    |              |
+----------------+---------+--------------------+--------------+
 External Lab: Chloride (2016)
 
+------------+-------+-----------+------------+--------------+
| Component  | Value | Ref Range | Performed  | Pathologist  |
|            |       |           | At         | Signature    |
+------------+-------+-----------+------------+--------------+
| Chloride,  | 106   | 95 - 112  | EXTERNAL   |              |
| External   |       |           | LAB        |              |
+------------+-------+-----------+------------+--------------+
 
 
 
+--------------------------+
| Resulting Agency Comment |
+--------------------------+
|   [facility]              |
+--------------------------+
 
 
 
+----------------+---------+--------------------+--------------+
| Performing     | Address | City/State/Zipcode | Phone Number |
| Organization   |         |                    |              |
 
+----------------+---------+--------------------+--------------+
|   EXTERNAL LAB |         |                    |              |
+----------------+---------+--------------------+--------------+
 External Lab: Potassium (2016)
 
+-------------+-------+-----------+------------+--------------+
| Component   | Value | Ref Range | Performed  | Pathologist  |
|             |       |           | At         | Signature    |
+-------------+-------+-----------+------------+--------------+
| Potassium,  | 4.5   | 3.6 - 5.1 | EXTERNAL   |              |
| External    |       |           | LAB        |              |
+-------------+-------+-----------+------------+--------------+
 
 
 
+--------------------------+
| Resulting Agency Comment |
+--------------------------+
|   [facility]              |
+--------------------------+
 
 
 
+----------------+---------+--------------------+--------------+
| Performing     | Address | City/State/Zipcode | Phone Number |
| Organization   |         |                    |              |
+----------------+---------+--------------------+--------------+
|   EXTERNAL LAB |         |                    |              |
+----------------+---------+--------------------+--------------+
 External Lab: Sodium (2016)
 
+-----------+-------+-----------+------------+--------------+
| Component | Value | Ref Range | Performed  | Pathologist  |
|           |       |           | At         | Signature    |
+-----------+-------+-----------+------------+--------------+
| Sodium,   | 138   | 132 - 143 | EXTERNAL   |              |
| External  |       |           | LAB        |              |
+-----------+-------+-----------+------------+--------------+
 
 
 
+--------------------------+
| Resulting Agency Comment |
+--------------------------+
|   [facility]              |
+--------------------------+
 
 
 
+----------------+---------+--------------------+--------------+
| Performing     | Address | City/State/Zipcode | Phone Number |
| Organization   |         |                    |              |
+----------------+---------+--------------------+--------------+
|   EXTERNAL LAB |         |                    |              |
+----------------+---------+--------------------+--------------+
 External Lab: CBC (2016)
 
+-------------+---------+-----------+------------+--------------+
| Component   | Value   | Ref Range | Performed  | Pathologist  |
|             |         |           | At         | Signature    |
 
+-------------+---------+-----------+------------+--------------+
| WBC,        | 4.7     | 4.5 - 11  | EXTERNAL   |              |
| External    |         |           | LAB        |              |
+-------------+---------+-----------+------------+--------------+
| HGB,        | 13.7    | 12 - 16   | EXTERNAL   |              |
| External    |         |           | LAB        |              |
+-------------+---------+-----------+------------+--------------+
| HCT,        | 41.6    | 35 - 45   | EXTERNAL   |              |
| External    |         |           | LAB        |              |
+-------------+---------+-----------+------------+--------------+
| PLT,        | 156     | 140 - 440 | EXTERNAL   |              |
| External    |         |           | LAB        |              |
+-------------+---------+-----------+------------+--------------+
| Neutrophils | 59.6    |           | EXTERNAL   |              |
|   %,        |         |           | LAB        |              |
| External    |         |           |            |              |
+-------------+---------+-----------+------------+--------------+
| Lymphocytes | 30.3    |           | EXTERNAL   |              |
|  %,         |         |           | LAB        |              |
| External    |         |           |            |              |
+-------------+---------+-----------+------------+--------------+
| Monocytes   | 7.3     |           | EXTERNAL   |              |
| %, External |         |           | LAB        |              |
+-------------+---------+-----------+------------+--------------+
| Eosinophils | 2.4     |           | EXTERNAL   |              |
|  %,         |         |           | LAB        |              |
| External    |         |           |            |              |
+-------------+---------+-----------+------------+--------------+
| RBC,        | 4.07    | 3.8 - 5.1 | EXTERNAL   |              |
| External    |         |           | LAB        |              |
+-------------+---------+-----------+------------+--------------+
| MCV,        | 102 (A) | 81 - 99   | EXTERNAL   |              |
| External    |         |           | LAB        |              |
+-------------+---------+-----------+------------+--------------+
| RDW,        | 14      | 10.5 - 15 | EXTERNAL   |              |
| External    |         |           | LAB        |              |
+-------------+---------+-----------+------------+--------------+
 
 
 
+--------------------------+
| Resulting Agency Comment |
+--------------------------+
|   [facility]              |
+--------------------------+
 
 
 
+----------------+---------+--------------------+--------------+
| Performing     | Address | City/State/Zipcode | Phone Number |
| Organization   |         |                    |              |
+----------------+---------+--------------------+--------------+
|   EXTERNAL LAB |         |                    |              |
+----------------+---------+--------------------+--------------+
 External Lab: Triglycerides (2016)
 
+-------------+---------+-----------+------------+--------------+
| Component   | Value   | Ref Range | Performed  | Pathologist  |
|             |         |           | At         | Signature    |
+-------------+---------+-----------+------------+--------------+
 
| Triglycerid | 207 (A) | 30 - 150  | EXTERNAL   |              |
| es,         |         |           | LAB        |              |
| External    |         |           |            |              |
+-------------+---------+-----------+------------+--------------+
 
 
 
+-----------------+
| Specimen        |
+-----------------+
| Blood specimen  |
| (specimen)      |
+-----------------+
 
 
 
+--------------------------+
| Resulting Agency Comment |
+--------------------------+
|   [facility]              |
+--------------------------+
 
 
 
+----------------+---------+--------------------+--------------+
| Performing     | Address | City/State/Zipcode | Phone Number |
| Organization   |         |                    |              |
+----------------+---------+--------------------+--------------+
|   EXTERNAL LAB |         |                    |              |
+----------------+---------+--------------------+--------------+
 External Lab: Cholesterol, HDL (2016)
 
+-------------+----------+-----------+------------+--------------+
| Component   | Value    | Ref Range | Performed  | Pathologist  |
|             |          |           | At         | Signature    |
+-------------+----------+-----------+------------+--------------+
| HDL         | 55.8 (A) | 40 mg/dl  | EXTERNAL   |              |
| Cholesterol |          |           | LAB        |              |
| , External  |          |           |            |              |
+-------------+----------+-----------+------------+--------------+
 
 
 
+-----------------+
| Specimen        |
+-----------------+
| Blood specimen  |
| (specimen)      |
+-----------------+
 
 
 
+--------------------------+
| Resulting Agency Comment |
+--------------------------+
|   [facility]              |
+--------------------------+
 
 
 
 
+----------------+---------+--------------------+--------------+
| Performing     | Address | City/State/Zipcode | Phone Number |
| Organization   |         |                    |              |
+----------------+---------+--------------------+--------------+
|   EXTERNAL LAB |         |                    |              |
+----------------+---------+--------------------+--------------+
 External Lab: Cholesterol, Total (2016)
 
+-------------+-------+-----------+------------+--------------+
| Component   | Value | Ref Range | Performed  | Pathologist  |
|             |       |           | At         | Signature    |
+-------------+-------+-----------+------------+--------------+
| Cholesterol | 175   | 200 mg/dl | EXTERNAL   |              |
| , Total,    |       |           | LAB        |              |
| External    |       |           |            |              |
+-------------+-------+-----------+------------+--------------+
 
 
 
+-----------------+
| Specimen        |
+-----------------+
| Blood specimen  |
| (specimen)      |
+-----------------+
 
 
 
+--------------------------+
| Resulting Agency Comment |
+--------------------------+
|   [facility]              |
+--------------------------+
 
 
 
+----------------+---------+--------------------+--------------+
| Performing     | Address | City/State/Zipcode | Phone Number |
| Organization   |         |                    |              |
+----------------+---------+--------------------+--------------+
|   EXTERNAL LAB |         |                    |              |
+----------------+---------+--------------------+--------------+
 External Lab: Cholesterol, LDL Direct (2016)
 
+-------------+-------+-----------+------------+--------------+
| Component   | Value | Ref Range | Performed  | Pathologist  |
|             |       |           | At         | Signature    |
+-------------+-------+-----------+------------+--------------+
| LDL         | 78    | 100       | EXTERNAL   |              |
| Cholesterol |       |           | LAB        |              |
| , Direct,   |       |           |            |              |
| External    |       |           |            |              |
+-------------+-------+-----------+------------+--------------+
 
 
 
+-----------------+
| Specimen        |
+-----------------+
| Blood specimen  |
 
| (specimen)      |
+-----------------+
 
 
 
+--------------------------+
| Resulting Agency Comment |
+--------------------------+
|   [facility]              |
+--------------------------+
 
 
 
+----------------+---------+--------------------+--------------+
| Performing     | Address | City/State/Zipcode | Phone Number |
| Organization   |         |                    |              |
+----------------+---------+--------------------+--------------+
|   EXTERNAL LAB |         |                    |              |
+----------------+---------+--------------------+--------------+
 External Lab: eGFR (2016)
 
+-----------+-------+-----------+------------+--------------+
| Component | Value | Ref Range | Performed  | Pathologist  |
|           |       |           | At         | Signature    |
+-----------+-------+-----------+------------+--------------+
| eGFR,     | 46    |           | EXTERNAL   |              |
| External  |       |           | LAB        |              |
+-----------+-------+-----------+------------+--------------+
 
 
 
+-----------------+
| Specimen        |
+-----------------+
| Blood specimen  |
| (specimen)      |
+-----------------+
 
 
 
+--------------------------+
| Resulting Agency Comment |
+--------------------------+
|   [facility]              |
+--------------------------+
 
 
 
+----------------+---------+--------------------+--------------+
| Performing     | Address | City/State/Zipcode | Phone Number |
| Organization   |         |                    |              |
+----------------+---------+--------------------+--------------+
|   EXTERNAL LAB |         |                    |              |
+----------------+---------+--------------------+--------------+
 External Lab: Creatinine (2016)
 
+-------------+-------+------------+------------+--------------+
| Component   | Value | Ref Range  | Performed  | Pathologist  |
|             |       |            | At         | Signature    |
+-------------+-------+------------+------------+--------------+
 
| Creatinine, | 1.16  | 0.7 - 1.25 | EXTERNAL   |              |
|  External   |       |            | LAB        |              |
+-------------+-------+------------+------------+--------------+
 
 
 
+-----------------+
| Specimen        |
+-----------------+
| Blood specimen  |
| (specimen)      |
+-----------------+
 
 
 
+--------------------------+
| Resulting Agency Comment |
+--------------------------+
|   [facility]              |
+--------------------------+
 
 
 
+----------------+---------+--------------------+--------------+
| Performing     | Address | City/State/Zipcode | Phone Number |
| Organization   |         |                    |              |
+----------------+---------+--------------------+--------------+
|   EXTERNAL LAB |         |                    |              |
+----------------+---------+--------------------+--------------+
 documented in this encounter
 
 Visit Diagnoses
 Not on filedocumented in this encounter

## 2020-01-08 NOTE — XMS
Encounter Summary
  Created on: 2020
 
 Viviana Ricci
 External Reference #: [id number]
 : 47
 Sex: Female
 
 Demographics
 
 
+-----------------------+----------------------+
| Address               | [address]      |
|                       | [place]  [address] |
+-----------------------+----------------------+
| Home Phone            | [phone]      |
+-----------------------+----------------------+
| Preferred Language    | Unknown              |
+-----------------------+----------------------+
| Marital Status        | Single               |
+-----------------------+----------------------+
| Jainism Affiliation | [id number]                 |
+-----------------------+----------------------+
| Race                  | Unknown              |
+-----------------------+----------------------+
| Ethnic Group          | Unknown              |
+-----------------------+----------------------+
 
 
 Author
 
 
+--------------+--------------------------------------------+
| Author       | [facility]  |
|              | and [place]                                |
+--------------+--------------------------------------------+
| Organization | [facility]  |
|              | and [place]                                |
+--------------+--------------------------------------------+
| Address      | Unknown                                    |
+--------------+--------------------------------------------+
| Phone        | Unavailable                                |
+--------------+--------------------------------------------+
 
 
 
 Support
 
 
+----------------+--------------+---------------------+-----------------+
| Name           | Relationship | Address             | Phone           |
+----------------+--------------+---------------------+-----------------+
| Ada/Ed Radhames | ECON         | [place]              | [phone] |
|                |              | [place]  |                 |
|                |              |  [address]              |                 |
+----------------+--------------+---------------------+-----------------+
 
 
 
 
 Care Team Providers
 
 
+-----------------------+------+-------------+
| Care Team Member Name | Role | Phone       |
+-----------------------+------+-------------+
 PCP  | Unavailable |
+-----------------------+------+-------------+
 
 
 
 Reason for Visit
 
 
+-------------------+----------+
| Reason            | Comments |
+-------------------+----------+
| Medication Refill |          |
+-------------------+----------+
 
 
 
 Encounter Details
 
 
+--------+--------+---------------------+----------------------+-------------------+
| Date   | Type   | Department          | Care Team            | Description       |
+--------+--------+---------------------+----------------------+-------------------+
| 10/10/ | Refill |   [facility]         |   Marzena Moser  | Medication Refill |
| 2016   |        | NEPHROLOGY  [address]   | M, DO  [address]      |                   |
|        |        | [address]   | [address]      |                   |
|        |        | [place], [place]     | [place], [place]      |                   |
|        |        | [address]          | [address]  [phone]  |                   |
|        |        | [phone]        |  [phone] (Fax)  |                   |
+--------+--------+---------------------+----------------------+-------------------+
 
 
 
 Social History
 
 
+--------------+-------+-----------+--------+------+
| Tobacco Use  | Types | Packs/Day | Years  | Date |
|              |       |           | Used   |      |
+--------------+-------+-----------+--------+------+
| Never Smoker |       |           |        |      |
+--------------+-------+-----------+--------+------+
 
 
 
+---------------------+---+---+---+
| Smokeless Tobacco:  |   |   |   |
| Never Used          |   |   |   |
+---------------------+---+---+---+
 
 
 
+---------------------------------------------------------------+
| Comments: some second hand smoke exposure, but fairly minimal |
 
+---------------------------------------------------------------+
 
 
 
+-------------+-------------+---------+----------+
| Alcohol Use | Drinks/Week | oz/Week | Comments |
+-------------+-------------+---------+----------+
| No          |             |         |          |
+-------------+-------------+---------+----------+
 
 
 
+------------------+---------------+
| Sex Assigned at  | Date Recorded |
| Birth            |               |
+------------------+---------------+
| Not on file      |               |
+------------------+---------------+
 
 
 
+----------------+-------------+-------------+
| Job Start Date | Occupation  | Industry    |
+----------------+-------------+-------------+
| Not on file    | Not on file | Not on file |
+----------------+-------------+-------------+
 
 
 
+----------------+--------------+------------+
| Travel History | Travel Start | Travel End |
+----------------+--------------+------------+
 
 
 
+-------------------------------------+
| No recent travel history available. |
+-------------------------------------+
 documented as of this encounter
 
 Plan of Treatment
 
 
+--------+-----------+------------+----------------------+-------------------+
| Date   | Type      | Specialty  | Care Team            | Description       |
+--------+-----------+------------+----------------------+-------------------+
| / | Office    | Cardiology |   HellalfredoTonia,    |                   |
|    | Visit     |            | ARNP  [address]   |                   |
|        |           |            | St  [place], [place]  |                   |
|        |           |            | [address]  [phone]  |                   |
|        |           |            |  [phone] (Fax)  |                   |
+--------+-----------+------------+----------------------+-------------------+
| / | Hospital  | Radiology  |   Popeye Townsend, |                   |
|    | Encounter |            |  MD  [address] |                   |
|        |           |            |  St.  [place],   |                   |
|        |           |            | [place] [address]             |                   |
|        |           |            | [phone]         |                   |
|        |           |            | [phone] (Fax)   |                   |
+--------+-----------+------------+----------------------+-------------------+
| / | Surgery   | Radiology  |   Popeye Townsend, | CV EP PPM SYSTEM  |
 
|    |           |            |  MD  [address] | IMPLANT           |
|        |           |            |  St.  [place],   |                   |
|        |           |            | [place] [address]             |                   |
|        |           |            | [phone]         |                   |
|        |           |            | [phone] (Fax)   |                   |
+--------+-----------+------------+----------------------+-------------------+
| 02/10/ | Clinical  | Cardiology |                      |                   |
|    | Support   |            |                      |                   |
+--------+-----------+------------+----------------------+-------------------+
| / | Office    | Cardiology |   Tonia Wilson,    |                   |
|    | Visit     |            | ARNP  [address]   |                   |
|        |           |            | [place]  |                   |
|        |           |            | [address]  [phone]  |                   |
|        |           |            |  [phone] (Fax)  |                   |
+--------+-----------+------------+----------------------+-------------------+
| / | Off-Site  | Nephrology |   Marzena Moser  |                   |
|    | Visit     |            | DO ETIENNE  [address] [place] [address]      |                   |
|        |           |            | [address]      |                   |
|        |           |            | [place]      |                   |
|        |           |            | [address]  [phone]  |                   |
|        |           |            |  [phone] (Fax)  |                   |
+--------+-----------+------------+----------------------+-------------------+
 documented as of this encounter
 
 Visit Diagnoses
 Not on filedocumented in this encounter

## 2020-01-08 NOTE — XMS
Encounter Summary
  Created on: 2020
 
 Viviana Ricci
 External Reference #: [id number]
 : 47
 Sex: Female
 
 Demographics
 
 
+-----------------------+----------------------+
| Address               | [address]      |
|                       | [place]  [address] |
+-----------------------+----------------------+
| Home Phone            | [phone]      |
+-----------------------+----------------------+
| Preferred Language    | Unknown              |
+-----------------------+----------------------+
| Marital Status        | Single               |
+-----------------------+----------------------+
| Yarsani Affiliation | [id number]                 |
+-----------------------+----------------------+
| Race                  | Unknown              |
+-----------------------+----------------------+
| Ethnic Group          | Unknown              |
+-----------------------+----------------------+
 
 
 Author
 
 
+--------------+--------------------------------------------+
| Author       | [facility]  |
|              | and [place]                                |
+--------------+--------------------------------------------+
| Organization | [facility]  |
|              | and [place]                                |
+--------------+--------------------------------------------+
| Address      | Unknown                                    |
+--------------+--------------------------------------------+
| Phone        | Unavailable                                |
+--------------+--------------------------------------------+
 
 
 
 Support
 
 
+----------------+--------------+---------------------+-----------------+
| Name           | Relationship | Address             | Phone           |
+----------------+--------------+---------------------+-----------------+
| Ada/Ed Radhames | ECON         | [place]              | [phone] |
|                |              | [place]  |                 |
|                |              |  [address]              |                 |
+----------------+--------------+---------------------+-----------------+
 
 
 
 
 Care Team Providers
 
 
+-----------------------+------+-------------+
| Care Team Member Name | Role | Phone       |
+-----------------------+------+-------------+
 PCP  | Unavailable |
+-----------------------+------+-------------+
 
 
 
 Encounter Details
 
 
+--------+----------+---------------------+----------------------+-------------+
| Date   | Type     | Department          | Care Team            | Description |
+--------+----------+---------------------+----------------------+-------------+
| / | Abstract |   [facility]         |   Marzena Moser  |             |
|    |          | NEPHROLOGY  [address]   | M, DO  [address]      |             |
|        |          | [address]   | [address]      |             |
|        |          | [place], [place]     | [place]      |             |
|        |          | [address]          | [address]  [phone]  |             |
|        |          | [phone]        |  [phone] (Fax)  |             |
+--------+----------+---------------------+----------------------+-------------+
 
 
 
 Social History
 
 
+--------------+-------+-----------+--------+------+
| Tobacco Use  | Types | Packs/Day | Years  | Date |
|              |       |           | Used   |      |
+--------------+-------+-----------+--------+------+
| Never Smoker |       |           |        |      |
+--------------+-------+-----------+--------+------+
 
 
 
+---------------------+---+---+---+
| Smokeless Tobacco:  |   |   |   |
| Never Used          |   |   |   |
+---------------------+---+---+---+
 
 
 
+---------------------------------------------------------------+
| Comments: some second hand smoke exposure, but fairly minimal |
+---------------------------------------------------------------+
 
 
 
+-------------+-------------+---------+----------+
| Alcohol Use | Drinks/Week | oz/Week | Comments |
+-------------+-------------+---------+----------+
| No          |             |         |          |
+-------------+-------------+---------+----------+
 
 
 
 
+------------------+---------------+
| Sex Assigned at  | Date Recorded |
| Birth            |               |
+------------------+---------------+
| Not on file      |               |
+------------------+---------------+
 
 
 
+----------------+-------------+-------------+
| Job Start Date | Occupation  | Industry    |
+----------------+-------------+-------------+
| Not on file    | Not on file | Not on file |
+----------------+-------------+-------------+
 
 
 
+----------------+--------------+------------+
| Travel History | Travel Start | Travel End |
+----------------+--------------+------------+
 
 
 
+-------------------------------------+
| No recent travel history available. |
+-------------------------------------+
 documented as of this encounter
 
 Plan of Treatment
 
 
+--------+-----------+------------+----------------------+-------------------+
| Date   | Type      | Specialty  | Care Team            | Description       |
+--------+-----------+------------+----------------------+-------------------+
| / | Office    | Cardiology |   Tonia Wilson,    |                   |
|    | Visit     |            | ARNJESSICA  [address]   |                   |
|        |           |            | [place]  |                   |
|        |           |            | [address]  [phone]  |                   |
|        |           |            |  [phone] (Fax)  |                   |
+--------+-----------+------------+----------------------+-------------------+
| / | Hospital  | Radiology  |   Popeye Townsend, |                   |
|    | Encounter |            |  MD  [address] |                   |
|        |           |            |  St.  [place],   |                   |
|        |           |            | [place] [address]             |                   |
|        |           |            | [phone]         |                   |
|        |           |            | [phone] (Fax)   |                   |
+--------+-----------+------------+----------------------+-------------------+
| / | Surgery   | Radiology  |   Popeye Townsend, | CV EP PPM SYSTEM  |
|    |           |            |  MD  [address] | IMPLANT           |
|        |           |            |  St.  [place],   |                   |
|        |           |            | [place] [address]             |                   |
|        |           |            | [phone]         |                   |
|        |           |            | [phone] (Fax)   |                   |
+--------+-----------+------------+----------------------+-------------------+
| 02/10/ | Clinical  | Cardiology |                      |                   |
|    | Support   |            |                      |                   |
+--------+-----------+------------+----------------------+-------------------+
| / | Office    | Cardiology |   Tonia Wilson,    |                   |
|    | Visit     |            | ARNP  [address]   |                   |
 
|        |           |            | St  [place], [place]  |                   |
|        |           |            | [address]  [phone]  |                   |
|        |           |            |  [phone] (Fax)  |                   |
+--------+-----------+------------+----------------------+-------------------+
| / | Off-Site  | Nephrology |   Marzena Moser  |                   |
|    | Visit     |            | DO ETIENNE  [address] [place] [address]      |                   |
|        |           |            | [address]      |                   |
|        |           |            | [place]      |                   |
|        |           |            | [address]  [phone]  |                   |
|        |           |            |  [phone] (Fax)  |                   |
+--------+-----------+------------+----------------------+-------------------+
 documented as of this encounter
 
 Procedures
 
 
+--------------------+--------+------------+----------------------+----------------------+
| Procedure Name     | Priori | Date/Time  | Associated Diagnosis | Comments             |
|                    | ty     |            |                      |                      |
+--------------------+--------+------------+----------------------+----------------------+
| EXTERNAL LAB:      | Routin | 2018 |                      |   Results for this   |
| TACROLIMUS LEVEL,  | e      |            |                      | procedure are in the |
| [facility]               |        |            |                      |  results section.    |
+--------------------+--------+------------+----------------------+----------------------+
 documented in this encounter
 
 Results
 External Lab: Tacrolimus Level, [facility] (2018)
 
+-------------+-------+-----------+------------+--------------+
| Component   | Value | Ref Range | Performed  | Pathologist  |
|             |       |           | At         | Signature    |
+-------------+-------+-----------+------------+--------------+
| Tacrolimus, | 5.6   |           |            |              |
|  [facility],      |       |           |            |              |
| External    |       |           |            |              |
+-------------+-------+-----------+------------+--------------+
 
 
 
+----------+
| Specimen |
+----------+
|          |
+----------+
 documented in this encounter
 
 Visit Diagnoses
 Not on filedocumented in this encounter

## 2020-01-08 NOTE — XMS
Encounter Summary
  Created on: 2020
 
 Viviana Ricci
 External Reference #: [id number]
 : 47
 Sex: Female
 
 Demographics
 
 
+-----------------------+----------------------+
| Address               | [address]      |
|                       | [place]  [address] |
+-----------------------+----------------------+
| Home Phone            | [phone]      |
+-----------------------+----------------------+
| Preferred Language    | Unknown              |
+-----------------------+----------------------+
| Marital Status        | Single               |
+-----------------------+----------------------+
| Lutheran Affiliation | [id number]                 |
+-----------------------+----------------------+
| Race                  | Unknown              |
+-----------------------+----------------------+
| Ethnic Group          | Unknown              |
+-----------------------+----------------------+
 
 
 Author
 
 
+--------------+--------------------------------------------+
| Author       | [facility]  |
|              | and [place]                                |
+--------------+--------------------------------------------+
| Organization | [facility]  |
|              | and [place]                                |
+--------------+--------------------------------------------+
| Address      | Unknown                                    |
+--------------+--------------------------------------------+
| Phone        | Unavailable                                |
+--------------+--------------------------------------------+
 
 
 
 Support
 
 
+----------------+--------------+---------------------+-----------------+
| Name           | Relationship | Address             | Phone           |
+----------------+--------------+---------------------+-----------------+
| Ada/Ed Radhames | ECON         | [address]              | [phone] |
|                |              | [place]  |                 |
|                |              |  [address]              |                 |
+----------------+--------------+---------------------+-----------------+
 
 
 
 
 Care Team Providers
 
 
+-----------------------+------+-------------+
| Care Team Member Name | Role | Phone       |
+-----------------------+------+-------------+
 PCP  | Unavailable |
+-----------------------+------+-------------+
 
 
 
 Encounter Details
 
 
+--------+----------+---------------------+----------------------+-------------+
| Date   | Type     | Department          | Care Team            | Description |
+--------+----------+---------------------+----------------------+-------------+
| / | Abstract |   [facility] [place]         |   Marzena Moser  |             |
|    |          | NEPHROLOGY  [address]   | M, DO  [address]      |             |
|        |          | [address]   | [address]      |             |
|        |          | [place], [place]     | [place]      |             |
|        |          | [address]          | [address]  [phone]  |             |
|        |          | [phone]        |  [phone] (Fax)  |             |
+--------+----------+---------------------+----------------------+-------------+
 
 
 
 Social History
 
 
+--------------+-------+-----------+--------+------+
| Tobacco Use  | Types | Packs/Day | Years  | Date |
|              |       |           | Used   |      |
+--------------+-------+-----------+--------+------+
| Never Smoker |       |           |        |      |
+--------------+-------+-----------+--------+------+
 
 
 
+---------------------+---+---+---+
| Smokeless Tobacco:  |   |   |   |
| Never Used          |   |   |   |
+---------------------+---+---+---+
 
 
 
+---------------------------------------------------------------+
| Comments: some second hand smoke exposure, but fairly minimal |
+---------------------------------------------------------------+
 
 
 
+-------------+-------------+---------+----------+
| Alcohol Use | Drinks/Week | oz/Week | Comments |
+-------------+-------------+---------+----------+
| No          |             |         |          |
+-------------+-------------+---------+----------+
 
 
 
 
+------------------+---------------+
| Sex Assigned at  | Date Recorded |
| Birth            |               |
+------------------+---------------+
| Not on file      |               |
+------------------+---------------+
 
 
 
+----------------+-------------+-------------+
| Job Start Date | Occupation  | Industry    |
+----------------+-------------+-------------+
| Not on file    | Not on file | Not on file |
+----------------+-------------+-------------+
 
 
 
+----------------+--------------+------------+
| Travel History | Travel Start | Travel End |
+----------------+--------------+------------+
 
 
 
+-------------------------------------+
| No recent travel history available. |
+-------------------------------------+
 documented as of this encounter
 
 Plan of Treatment
 
 
+--------+-----------+------------+----------------------+-------------------+
| Date   | Type      | Specialty  | Care Team            | Description       |
+--------+-----------+------------+----------------------+-------------------+
| / | Office    | Cardiology |   Tonia Wilson,    |                   |
|    | Visit     |            | ARNJESSICA  [address]   |                   |
|        |           |            | [place]  |                   |
|        |           |            | [address]  [phone]  |                   |
|        |           |            |  [phone] (Fax)  |                   |
+--------+-----------+------------+----------------------+-------------------+
| / | Hospital  | Radiology  |   Popeye Townsend, |                   |
|    | Encounter |            |  MD  [address] |                   |
|        |           |            |  St.  [place],   |                   |
|        |           |            | [place] [address]             |                   |
|        |           |            | [phone]         |                   |
|        |           |            | [phone] (Fax)   |                   |
+--------+-----------+------------+----------------------+-------------------+
| / | Surgery   | Radiology  |   Popeye Townsend, | CV EP PPM SYSTEM  |
|    |           |            |  MD  [address] | IMPLANT           |
|        |           |            |  St.  [place],   |                   |
|        |           |            | [place] [address]             |                   |
|        |           |            | [phone]         |                   |
|        |           |            | [phone] (Fax)   |                   |
+--------+-----------+------------+----------------------+-------------------+
| 02/10/ | Clinical  | Cardiology |                      |                   |
|    | Support   |            |                      |                   |
+--------+-----------+------------+----------------------+-------------------+
| / | Office    | Cardiology |   Tonia Wilson,    |                   |
|    | Visit     |            | ARNP  [address]   |                   |
 
|        |           |            | St  [place], [place]  |                   |
|        |           |            | [address] [phone]  |                   |
|        |           |            |  [phone] (Fax)  |                   |
+--------+-----------+------------+----------------------+-------------------+
| / | Off-Site  | Nephrology |   Marzena Moser  |                   |
| 2020   | Visit     |            | DO ETIENNE  [address] [place] [address]      |                   |
|        |           |            | [address]      |                   |
|        |           |            | [place]      |                   |
|        |           |            | [id number] [phone]  |                   |
|        |           |            |  [phone] (Fax)  |                   |
+--------+-----------+------------+----------------------+-------------------+
 documented as of this encounter
 
 Visit Diagnoses
 Not on filedocumented in this encounter

## 2020-01-08 NOTE — XMS
Encounter Summary
  Created on: 2020
 
 Viviana Ricci
 External Reference #: [id number]
 : 47
 Sex: Female
 
 Demographics
 
 
+-----------------------+----------------------+
| Address               | [address]      |
|                       | [place]  [address] |
+-----------------------+----------------------+
| Home Phone            | [phone]      |
+-----------------------+----------------------+
| Preferred Language    | Unknown              |
+-----------------------+----------------------+
| Marital Status        | Single               |
+-----------------------+----------------------+
| Zoroastrian Affiliation | 1009                 |
+-----------------------+----------------------+
| Race                  | Unknown              |
+-----------------------+----------------------+
| Ethnic Group          | Unknown              |
+-----------------------+----------------------+
 
 
 Author
 
 
+--------------+--------------------------------------------+
| Author       | [facility]  |
|              | and [place]                                |
+--------------+--------------------------------------------+
| Organization | [facility]  |
|              | and [place]                                |
+--------------+--------------------------------------------+
| Address      | Unknown                                    |
+--------------+--------------------------------------------+
| Phone        | Unavailable                                |
+--------------+--------------------------------------------+
 
 
 
 Support
 
 
+----------------+--------------+---------------------+-----------------+
| Name           | Relationship | Address             | Phone           |
+----------------+--------------+---------------------+-----------------+
| Ada/Ed Radhames | ECON         | [address]              | [phone] |
|                |              | [place]  |                 |
|                |              |  [address]              |                 |
+----------------+--------------+---------------------+-----------------+
 
 
 
 
 Care Team Providers
 
 
+-----------------------+------+-------------+
| Care Team Member Name | Role | Phone       |
+-----------------------+------+-------------+
 PCP  | Unavailable |
+-----------------------+------+-------------+
 
 
 
 Encounter Details
 
 
+--------+----------+---------------------+----------------------+-------------+
| Date   | Type     | Department          | Care Team            | Description |
+--------+----------+---------------------+----------------------+-------------+
| / | Abstract |   [facility] [place]         |   Marzena Moser  |             |
|    |          | NEPHROLOGY  [address]   | M, DO  [address]      |             |
|        |          | [address]   | [address]      |             |
|        |          | [place], [place]     | [place]      |             |
|        |          | [address]          | [address]  [phone]  |             |
|        |          | [phone]        |  [phone] (Fax)  |             |
+--------+----------+---------------------+----------------------+-------------+
 
 
 
 Social History
 
 
+--------------+-------+-----------+--------+------+
| Tobacco Use  | Types | Packs/Day | Years  | Date |
|              |       |           | Used   |      |
+--------------+-------+-----------+--------+------+
| Never Smoker |       |           |        |      |
+--------------+-------+-----------+--------+------+
 
 
 
+---------------------+---+---+---+
| Smokeless Tobacco:  |   |   |   |
| Never Used          |   |   |   |
+---------------------+---+---+---+
 
 
 
+---------------------------------------------------------------+
| Comments: some second hand smoke exposure, but fairly minimal |
+---------------------------------------------------------------+
 
 
 
+-------------+-------------+---------+----------+
| Alcohol Use | Drinks/Week | oz/Week | Comments |
+-------------+-------------+---------+----------+
| No          |             |         |          |
+-------------+-------------+---------+----------+
 
 
 
 
+------------------+---------------+
| Sex Assigned at  | Date Recorded |
| Birth            |               |
+------------------+---------------+
| Not on file      |               |
+------------------+---------------+
 
 
 
+----------------+-------------+-------------+
| Job Start Date | Occupation  | Industry    |
+----------------+-------------+-------------+
| Not on file    | Not on file | Not on file |
+----------------+-------------+-------------+
 
 
 
+----------------+--------------+------------+
| Travel History | Travel Start | Travel End |
+----------------+--------------+------------+
 
 
 
+-------------------------------------+
| No recent travel history available. |
+-------------------------------------+
 documented as of this encounter
 
 Plan of Treatment
 
 
+--------+-----------+------------+----------------------+-------------------+
| Date   | Type      | Specialty  | Care Team            | Description       |
+--------+-----------+------------+----------------------+-------------------+
| / | Office    | Cardiology |   Tonia Wilson,    |                   |
|    | Visit     |            | ARNJESSICA  [address]   |                   |
|        |           |            | [place]  |                   |
|        |           |            | [address]  [phone]  |                   |
|        |           |            |  [phone] (Fax)  |                   |
+--------+-----------+------------+----------------------+-------------------+
| / | Hospital  | Radiology  |   Popeye Townsend, |                   |
|    | Encounter |            |  MD  [address] |                   |
|        |           |            |  St.  [place],   |                   |
|        |           |            | [place] [address]             |                   |
|        |           |            | [phone]         |                   |
|        |           |            | [phone] (Fax)   |                   |
+--------+-----------+------------+----------------------+-------------------+
| / | Surgery   | Radiology  |   Popeye Townsend, | CV EP PPM SYSTEM  |
|    |           |            |  MD  [address] | IMPLANT           |
|        |           |            |  St.  [place],   |                   |
|        |           |            | [place] [address]             |                   |
|        |           |            | [phone]         |                   |
|        |           |            | [phone] (Fax)   |                   |
+--------+-----------+------------+----------------------+-------------------+
| 02/10/ | Clinical  | Cardiology |                      |                   |
|    | Support   |            |                      |                   |
+--------+-----------+------------+----------------------+-------------------+
| / | Office    | Cardiology |   Tonia Wilson,    |                   |
|    | Visit     |            | ARNP  [address]   |                   |
 
|        |           |            | St  [place], [place]  |                   |
|        |           |            | [address]  [phone]  |                   |
|        |           |            |  [phone] (Fax)  |                   |
+--------+-----------+------------+----------------------+-------------------+
| / | Off-Site  | Nephrology |   Marzena Moser  |                   |
|    | Visit     |            | DO ETIENNE  [address] [place] [address]      |                   |
|        |           |            | [address]      |                   |
|        |           |            | [place]      |                   |
|        |           |            | [address]  [phone]  |                   |
|        |           |            |  [phone] (Fax)  |                   |
+--------+-----------+------------+----------------------+-------------------+
 documented as of this encounter
 
 Procedures
 
 
+----------------+--------+------------+----------------------+----------------------+
| Procedure Name | Priori | Date/Time  | Associated Diagnosis | Comments             |
|                | ty     |            |                      |                      |
+----------------+--------+------------+----------------------+----------------------+
| EXTERNAL LAB:  | Routin | 2015 |                      |   Results for this   |
| URINALYSIS     | e      |            |                      | procedure are in the |
|                |        |            |                      |  results section.    |
+----------------+--------+------------+----------------------+----------------------+
| URINALYSIS     | Routin | 2015 |                      |   Results for this   |
|                | e      |            |                      | procedure are in the |
|                |        |            |                      |  results section.    |
+----------------+--------+------------+----------------------+----------------------+
 documented in this encounter
 
 Results
 Urinalysis (2015)
 
+-----------+--------+------------+-------------+--------------+
| Component | Value  | Ref Range  | Performed   | Pathologist  |
|           |        |            | At          | Signature    |
+-----------+--------+------------+-------------+--------------+
| WBC UA    | 50 (A) | 0 - 4 /HPF | [facility]  |              |
|           |        |            | [facility]    |              |
|           |        |            | [facility]     |              |
|           |        |            | [facility] -    |              |
|           |        |            | LABORATORY  |              |
+-----------+--------+------------+-------------+--------------+
 
 
 
+-----------------+
| Specimen        |
+-----------------+
| Urine specimen  |
| (specimen)      |
+-----------------+
 
 
 
+----------------------+--------------------+--------------------+----------------+
| Performing           | Address            | City/State/Zipcode | Phone Number   |
| Organization         |                    |                    |                |
+----------------------+--------------------+--------------------+----------------+
|   [facility]     |   [address] | [place]    |   [phone] |
 
| [facility]  |                    | [address]              |                |
| - LABORATORY         |                    |                    |                |
+----------------------+--------------------+--------------------+----------------+
 External Lab: Urinalysis (2015)
 
+-------------+----------+--------------+------------+--------------+
| Component   | Value    | Ref Range    | Performed  | Pathologist  |
|             |          |              | At         | Signature    |
+-------------+----------+--------------+------------+--------------+
| UA Blood,   | negative |              | EXTERNAL   |              |
| External    |          |              | LAB        |              |
+-------------+----------+--------------+------------+--------------+
| UA Glucose, | normal   |              | EXTERNAL   |              |
|  External   |          |              | LAB        |              |
+-------------+----------+--------------+------------+--------------+
| UA Ketones, | negative |              | EXTERNAL   |              |
|  External   |          |              | LAB        |              |
+-------------+----------+--------------+------------+--------------+
| UA Ph,      | 6        | 5 - 9        | EXTERNAL   |              |
| External    |          |              | LAB        |              |
+-------------+----------+--------------+------------+--------------+
| UA          | 25 (A)   | 0            | EXTERNAL   |              |
| Proteins,   |          |              | LAB        |              |
| External    |          |              |            |              |
+-------------+----------+--------------+------------+--------------+
| UA RBC,     | 0        | 0            | EXTERNAL   |              |
| External    |          |              | LAB        |              |
+-------------+----------+--------------+------------+--------------+
| UA Specific | 1.013    | 1.005 - 1.03 | EXTERNAL   |              |
|  Gravity,   |          |              | LAB        |              |
| External    |          |              |            |              |
+-------------+----------+--------------+------------+--------------+
| UA          | 25 (A)   | 0            | EXTERNAL   |              |
| Leukocyte   |          |              | LAB        |              |
| Esterase,   |          |              |            |              |
| External    |          |              |            |              |
+-------------+----------+--------------+------------+--------------+
 
 
 
+----------------+---------+--------------------+--------------+
| Performing     | Address | City/State/Zipcode | Phone Number |
| Organization   |         |                    |              |
+----------------+---------+--------------------+--------------+
|   EXTERNAL LAB |         |                    |              |
+----------------+---------+--------------------+--------------+
 documented in this encounter
 
 Visit Diagnoses
 Not on filedocumented in this encounter

## 2020-01-08 NOTE — XMS
Encounter Summary
  Created on: 2020
 
 Viviana Ricci
 External Reference #: [id number]
 : 47
 Sex: Female
 
 Demographics
 
 
+-----------------------+----------------------+
| Address               | [address]      |
|                       | [place]  [address] |
+-----------------------+----------------------+
| Home Phone            | [phone]      |
+-----------------------+----------------------+
| Preferred Language    | Unknown              |
+-----------------------+----------------------+
| Marital Status        | Single               |
+-----------------------+----------------------+
| Episcopal Affiliation | [id number]                 |
+-----------------------+----------------------+
| Race                  | Unknown              |
+-----------------------+----------------------+
| Ethnic Group          | Unknown              |
+-----------------------+----------------------+
 
 
 Author
 
 
+--------------+--------------------------------------------+
| Author       | [facility]  |
|              | and [facility]                                |
+--------------+--------------------------------------------+
| Organization | [facility]  |
|              | and [facility]                                |
+--------------+--------------------------------------------+
| Address      | Unknown                                    |
+--------------+--------------------------------------------+
| Phone        | Unavailable                                |
+--------------+--------------------------------------------+
 
 
 
 Support
 
 
+----------------+--------------+---------------------+-----------------+
| Name           | Relationship | Address             | Phone           |
+----------------+--------------+---------------------+-----------------+
| Ada/Ed Radhames | ECON         | [address]              | [phone] |
|                |              | [place]  |                 |
|                |              |  [address]              |                 |
+----------------+--------------+---------------------+-----------------+
 
 
 
 
 Care Team Providers
 
 
+------------------------+------+-----------------+
| Care Team Member Name  | Role | Phone           |
+------------------------+------+-----------------+
| Marzena Moser DO | PCP  | [phone] |
+------------------------+------+-----------------+
 
 
 
 Reason for Visit
 
 
+--------+------------------------------+
| Reason | Comments                     |
+--------+------------------------------+
| Other  | Pt qualified for Pulm Rehab  |
+--------+------------------------------+
 
 
 
 Encounter Details
 
 
+--------+-----------+----------------------+----------------------+----------------------+
| Date   | Type      | Department           | Care Team            | Description          |
+--------+-----------+----------------------+----------------------+----------------------+
| 10/21/ | Telephone |   [facility]          |   Tonia Wilson,    | Other (Pt qualified  |
|    |           | CARDIOLOGY  [address]    | ARNP  [address]   | for Pul Rehab )     |
|        |           | [address]  [place], | [address]  [place], [place]  |                      |
|        |           |  [place] [address]       | [address] [phone]  |                      |
|        |           | [phone]         |  [phone] (Fax)  |                      |
+--------+-----------+----------------------+----------------------+----------------------+
 
 
 
 Social History
 
 
+--------------+-------+-----------+--------+------+
| Tobacco Use  | Types | Packs/Day | Years  | Date |
|              |       |           | Used   |      |
+--------------+-------+-----------+--------+------+
| Never Smoker |       |           |        |      |
+--------------+-------+-----------+--------+------+
 
 
 
+---------------------+---+---+---+
| Smokeless Tobacco:  |   |   |   |
| Never Used          |   |   |   |
+---------------------+---+---+---+
 
 
 
+---------------------------------------------------------------+
| Comments: some second hand smoke exposure, but fairly minimal |
+---------------------------------------------------------------+
 
 
 
 
+-------------+-------------+---------+----------+
| Alcohol Use | Drinks/Week | oz/Week | Comments |
+-------------+-------------+---------+----------+
| No          |             |         |          |
+-------------+-------------+---------+----------+
 
 
 
+------------------+---------------+
| Sex Assigned at  | Date Recorded |
| Birth            |               |
+------------------+---------------+
| Not on file      |               |
+------------------+---------------+
 
 
 
+----------------+-------------+-------------+
| Job Start Date | Occupation  | Industry    |
+----------------+-------------+-------------+
| Not on file    | Not on file | Not on file |
+----------------+-------------+-------------+
 
 
 
+----------------+--------------+------------+
| Travel History | Travel Start | Travel End |
+----------------+--------------+------------+
 
 
 
+-------------------------------------+
| No recent travel history available. |
+-------------------------------------+
 documented as of this encounter
 
 Plan of Treatment
 
 
+--------+-----------+------------+----------------------+-------------------+
| Date   | Type      | Specialty  | Care Team            | Description       |
+--------+-----------+------------+----------------------+-------------------+
| / | Office    | Cardiology |   Tonia Wilson,    |                   |
|    | Visit     |            | ARNJESSICA  [address]   |                   |
|        |           |            | St  [place], [place]  |                   |
|        |           |            | [address]  [phone]  |                   |
|        |           |            |  [phone] (Fax)  |                   |
+--------+-----------+------------+----------------------+-------------------+
| / | Hospital  | Radiology  |   Popeye Townsend, |                   |
|    | Encounter |            |  MD  [address] |                   |
|        |           |            |  St.  [place],   |                   |
|        |           |            | [place] [address]             |                   |
|        |           |            | [phone]         |                   |
|        |           |            | [phone] (Fax)   |                   |
+--------+-----------+------------+----------------------+-------------------+
| / | Surgery   | Radiology  |   Popeye Townsend, | CV EP PPM SYSTEM  |
|    |           |            |  MD  [address] | IMPLANT           |
 
|        |           |            |  St.  [place],   |                   |
|        |           |            | [place] [address]             |                   |
|        |           |            | [phone]         |                   |
|        |           |            | [phone] (Fax)   |                   |
+--------+-----------+------------+----------------------+-------------------+
| 02/10/ | Clinical  | Cardiology |                      |                   |
|    | Support   |            |                      |                   |
+--------+-----------+------------+----------------------+-------------------+
| / | Office    | Cardiology |   Tonia Wilson,    |                   |
|    | Visit     |            | BELKIS  [address]   |                   |
|        |           |            | [place]  |                   |
|        |           |            | [address]  [phone]  |                   |
|        |           |            |  [phone] (Fax)  |                   |
+--------+-----------+------------+----------------------+-------------------+
| / | Off-Site  | Nephrology |   Marzena Moser  |                   |
|    | Visit     |            | DO ETIENNE  [address] [place] [address]      |                   |
|        |           |            | [address]      |                   |
|        |           |            | [place]      |                   |
|        |           |            | [address]  [phone]  |                   |
|        |           |            |  [phone] (Fax)  |                   |
+--------+-----------+------------+----------------------+-------------------+
 documented as of this encounter
 
 Visit Diagnoses
 Not on filedocumented in this encounter

## 2020-01-08 NOTE — XMS
Encounter Summary
  Created on: 2020
 
 Viviana Hardy
 External Reference #: [id number]
 : 47
 Sex: Female
 
 Demographics
 
 
+-----------------------+----------------------+
| Address               | [address]      |
|                       | [place]  [address] |
+-----------------------+----------------------+
| Home Phone            | [phone]      |
+-----------------------+----------------------+
| Preferred Language    | Unknown              |
+-----------------------+----------------------+
| Marital Status        | Single               |
+-----------------------+----------------------+
| Mormonism Affiliation | [id number]                 |
+-----------------------+----------------------+
| Race                  | Unknown              |
+-----------------------+----------------------+
| Ethnic Group          | Unknown              |
+-----------------------+----------------------+
 
 
 Author
 
 
+--------------+--------------------------------------------+
| Author       | [facility]  |
|              | and [facility]                                |
+--------------+--------------------------------------------+
| Organization | [facility]  |
|              | and [facility]                                |
+--------------+--------------------------------------------+
| Address      | Unknown                                    |
+--------------+--------------------------------------------+
| Phone        | Unavailable                                |
+--------------+--------------------------------------------+
 
 
 
 Support
 
 
+----------------+--------------+---------------------+-----------------+
| Name           | Relationship | Address             | Phone           |
+----------------+--------------+---------------------+-----------------+
| Ada/Ed Radhames | ECON         | [address]              | [phone] |
|                |              | [place]  |                 |
|                |              |  [address]              |                 |
+----------------+--------------+---------------------+-----------------+
 
 
 
 
 Care Team Providers
 
 
+-----------------------+------+-------------+
| Care Team Member Name | Role | Phone       |
+-----------------------+------+-------------+
 PCP  | Unavailable |
+-----------------------+------+-------------+
 
 
 
 Reason for Visit
 
 
+----------+-------------------------------------------------------------+
| Reason   | Comments                                                    |
+----------+-------------------------------------------------------------+
| Leg Pain | estab pt:new problem left leg pain onset x couple of months |
+----------+-------------------------------------------------------------+
 
 
 
 Encounter Details
 
 
+--------+---------+----------------------+----------------------+---------------------+
| Date   | Type    | Department           | Care Team            | Description         |
+--------+---------+----------------------+----------------------+---------------------+
| 01/15/ | Office  |   [facility]          |   Edgar Sanders,   | Knee pain (Primary  |
|    | Visit   | ORTHOPEDIC SURGERY   | MD  [address] [place] [address]     | Dx); Leg pain       |
|        |         | [address] [place] [address]     | [place]      |                     |
|        |         | [place]      | [address] [phone]  |                     |
|        |         | [address]           |  [phone] (Fax)  |                     |
|        |         | [phone]         |                      |                     |
+--------+---------+----------------------+----------------------+---------------------+
 
 
 
 Social History
 
 
+--------------+-------+-----------+--------+------+
| Tobacco Use  | Types | Packs/Day | Years  | Date |
|              |       |           | Used   |      |
+--------------+-------+-----------+--------+------+
| Never Smoker |       |           |        |      |
+--------------+-------+-----------+--------+------+
 
 
 
+---------------------+---+---+---+
| Smokeless Tobacco:  |   |   |   |
| Never Used          |   |   |   |
+---------------------+---+---+---+
 
 
 
+---------------------------------------------------------------+
| Comments: some second hand smoke exposure, but fairly minimal |
 
+---------------------------------------------------------------+
 
 
 
+-------------+-------------+---------+----------+
| Alcohol Use | Drinks/Week | oz/Week | Comments |
+-------------+-------------+---------+----------+
| No          |             |         |          |
+-------------+-------------+---------+----------+
 
 
 
+------------------+---------------+
| Sex Assigned at  | Date Recorded |
| Birth            |               |
+------------------+---------------+
| Not on file      |               |
+------------------+---------------+
 
 
 
+----------------+-------------+-------------+
| Job Start Date | Occupation  | Industry    |
+----------------+-------------+-------------+
| Not on file    | Not on file | Not on file |
+----------------+-------------+-------------+
 
 
 
+----------------+--------------+------------+
| Travel History | Travel Start | Travel End |
+----------------+--------------+------------+
 
 
 
+-------------------------------------+
| No recent travel history available. |
+-------------------------------------+
 documented as of this encounter
 
 Last Filed Vital Signs
 
 
+-------------------+-------------------+----------------------+----------+
| Vital Sign        | Reading           | Time Taken           | Comments |
+-------------------+-------------------+----------------------+----------+
| Blood Pressure    | -                 | -                    |          |
+-------------------+-------------------+----------------------+----------+
| Pulse             | -                 | -                    |          |
+-------------------+-------------------+----------------------+----------+
| Temperature       | -                 | -                    |          |
+-------------------+-------------------+----------------------+----------+
| Respiratory Rate  | -                 | -                    |          |
+-------------------+-------------------+----------------------+----------+
| Oxygen Saturation | -                 | -                    |          |
+-------------------+-------------------+----------------------+----------+
| Inhaled Oxygen    | -                 | -                    |          |
| Concentration     |                   |                      |          |
+-------------------+-------------------+----------------------+----------+
| Weight            | 129.3 kg (285 lb) | 01/15/2014  3:36 PM  |          |
 
|                   |                   | PST                  |          |
+-------------------+-------------------+----------------------+----------+
| Height            | 167.6 cm (5' 6")  | 01/15/2014  3:36 PM  |          |
|                   |                   | PST                  |          |
+-------------------+-------------------+----------------------+----------+
| Body Mass Index   | 46                | 01/15/2014  3:36 PM  |          |
|                   |                   | PST                  |          |
+-------------------+-------------------+----------------------+----------+
 documented in this encounter
 
 Progress Notes
 Edgar Sanders MD - 2014 11:08 AM PSTPt is well known to me - I did right TKA  
and she has done very well with that
 She has pain in her left leg that is somewhat different from what she experienced on the ri
ght
 She does have intermittent knee pain but also has pain that she localizes from lateral thig
h and radiates down the lateral leg
 She doesn't feel her symptoms are bad enough to want to go thru surgery if that was what wa
s necessary
 
 On exam today she has mild lateral joint line tenderness left knee
 Reasonable preservation of ROM left knee
 No varus/valgus stress instability
 Skin intact without rashes or lesions
 No joint effusion
 Distal pulses intact
 
 xrays show advanced DJD left knee especially lateral compartment
 Impression - left knee djd
 
 We discussed the natural history and treatment options
 She will let us know how she is doing over time
 15 min face time spent majority counselingElectronically signed by Edgar Sanders MD at  11:12 AM PSTdocumented in this encounter
 
 Plan of Treatment
 
 
+--------+-----------+------------+----------------------+-------------------+
| Date   | Type      | Specialty  | Care Team            | Description       |
+--------+-----------+------------+----------------------+-------------------+
| / | Office    | Cardiology |   Tonia Wilson,    |                   |
|    | Visit     |            | ARNP  [address]   |                   |
|        |           |            | St  [place]  |                   |
|        |           |            | [address] [phone]  |                   |
|        |           |            |  [phone] (Fax)  |                   |
+--------+-----------+------------+----------------------+-------------------+
| / | Hospital  | Radiology  |   AimeePopeye murillo, |                   |
|    | Encounter |            |  MD  [address] |                   |
|        |           |            |  St.  [place],   |                   |
|        |           |            | [place] [address]             |                   |
|        |           |            | [phone]         |                   |
|        |           |            | [phone] (Fax)   |                   |
+--------+-----------+------------+----------------------+-------------------+
| / | Surgery   | Radiology  |   Popeye Townsend, | CV EP PPM SYSTEM  |
|    |           |            |  MD  [address] | IMPLANT           |
|        |           |            |  St.  [place],   |                   |
|        |           |            | [place] [address]             |                   |
|        |           |            | [phone]         |                   |
|        |           |            | [phone] (Fax)   |                   |
 
+--------+-----------+------------+----------------------+-------------------+
| 02/10/ | Clinical  | Cardiology |                      |                   |
|    | Support   |            |                      |                   |
+--------+-----------+------------+----------------------+-------------------+
| / | Office    | Cardiology |   Tonia Wilson,    |                   |
|    | Visit     |            | ARNP  [address]   |                   |
|        |           |            | St  [place], [place]  |                   |
|        |           |            | [address]  [phone]  |                   |
|        |           |            |  [phone] (Fax)  |                   |
+--------+-----------+------------+----------------------+-------------------+
| / | Off-Site  | Nephrology |   Marzena Moser  |                   |
|    | Visit     |            | DO ETIENNE  [address]      |                   |
|        |           |            | [address]      |                   |
|        |           |            | [place]      |                   |
|        |           |            | [address]  [phone]  |                   |
|        |           |            |  [phone] (Fax)  |                   |
+--------+-----------+------------+----------------------+-------------------+
 documented as of this encounter
 
 Results
 XR Knee Left 3 Vw (01/15/2014  5:14 PM PST)
 
+----------+
| Specimen |
+----------+
|          |
+----------+
 
 
 
+------------------------------------------------------------------------+-----------------+
| Narrative                                                              | Performed At    |
+------------------------------------------------------------------------+-----------------+
|    [facility]      Diagnostic Imaging          |   [place]    |
| Department      [address]      [phone]    | [facility]        |
|                             [   rep ct [address]]     [   rep ct [facility]  |
| st zip]     **** Signed ****                                           | - IMAGING       |
| ______________________________________________________________________ |                 |
| ______________________     Patient Name: VIVIANA HARDY   Physician:     |                 |
| .01     : 1945    Age: 68   Sex: F     Unit #: [id number]    |                 |
|   Exam Date: 01/15/14     Acct #: [id number]   [place] [facility] [place]     |                 |
|     Report #: [id number]                      Page:                     |                 |
|   %(RAD)RES..mtdd.print.filter("pg") of   %(RAD)                       |                 |
| RES..mtdd.print.filter("tpg")                                          |                 |
| ______________________________________________________________________ |                 |
| ______________________     Accession Number: [id number]                |                 |
| LEFT KNEE X-RAY              CLINICAL HISTORY:   LEFT KNEE PAIN, LEG   |                 |
| PAIN.               COMPARISON: None.               FINDINGS:   Three  |                 |
| views of the left knee were obtained.               There are no acute |                 |
|  osseous abnormalities. Diffuse osteopenia is present. There is a bone |                 |
|  island in   the proximal tibia. Moderate to severe lateral            |                 |
| compartment joint space loss is present. There is   chondrocalcinosis  |                 |
| of the medial and lateral menisci. The lateral meniscus appears to be  |                 |
| extruded. Mild   osteophytosis is present of the lateral compartment.  |                 |
| A moderate knee joint effusion is seen. Mild   atherosclerosis is      |                 |
| present.               Limited evaluation of the right knee            |                 |
| demonstrates hardware for arthroplasty as well as ORIF hardware   in   |                 |
| the femoral shaft.               IMPRESSION:       1. DEGENERATIVE     |                 |
| CHANGES OF THE LEFT KNEE AS DESCRIBED ABOVE INCLUDING MODERATE TO      |                 |
| SEVERE LATERAL   COMPARTMENT JOINT SPACE LOSS.               2.        |                 |
 
| MODERATE KNEE JOINT EFFUSION.               Dictated Date/Time:        |                 |
|   01/15/2014 17:14      Transcribed Date/Time:   01/15/2014 18:00      |                 |
|  :                         <<Signature on File>>  |                 |
|     -----------------------------------------------------------        |                 |
| Derek Reed MD01/15/14 2218     <Electronically signed by Derek Reed   |                 |
| MD>               Derek Reed MD 01/15/14 1714     :   |                 |
| zintin Ctrfdagngubkb54/15/14 1800               Edgar Sanders MD    |                 |
|                                                                        |                 |
+------------------------------------------------------------------------+-----------------+
 
 
 
+----------------------+---------------------+--------------------+----------------+
| Performing           | Address             | City[facility] | Phone Number   |
| Organization         |                     |                    |                |
+----------------------+---------------------+--------------------+----------------+
|   [facility]     |   [address] | [place]    |   [phone] |
| [facility]  |                     | [address]              |                |
| - IMAGING            |                     |                    |                |
+----------------------+---------------------+--------------------+----------------+
 documented in this encounter
 
 Visit Diagnoses
 
 
+-------------------------------------------------+
| Diagnosis                                       |
+-------------------------------------------------+
|   Knee pain - Primary  Pain in joint, lower leg |
+-------------------------------------------------+
|   Leg pain  Pain in limb                        |
+-------------------------------------------------+
 documented in this encounter

## 2020-01-08 NOTE — XMS
Encounter Summary
  Created on: 2020
 
 Viviana Ricci
 External Reference #: [id number]
 : 47
 Sex: Female
 
 Demographics
 
 
+-----------------------+----------------------+
| Address               | [address]      |
|                       | [place]  [address] |
+-----------------------+----------------------+
| Home Phone            | [phone]      |
+-----------------------+----------------------+
| Preferred Language    | Unknown              |
+-----------------------+----------------------+
| Marital Status        | Single               |
+-----------------------+----------------------+
| Scientology Affiliation | [id number]                 |
+-----------------------+----------------------+
| Race                  | Unknown              |
+-----------------------+----------------------+
| Ethnic Group          | Unknown              |
+-----------------------+----------------------+
 
 
 Author
 
 
+--------------+--------------------------------------------+
| Author       | [facility]  |
|              | and [place]                                |
+--------------+--------------------------------------------+
| Organization | [facility]  |
|              | and [place]                                |
+--------------+--------------------------------------------+
| Address      | Unknown                                    |
+--------------+--------------------------------------------+
| Phone        | Unavailable                                |
+--------------+--------------------------------------------+
 
 
 
 Support
 
 
+----------------+--------------+---------------------+-----------------+
| Name           | Relationship | Address             | Phone           |
+----------------+--------------+---------------------+-----------------+
| Ada/Ed Radhames | ECON         | [address]              | [phone] |
|                |              | [place]  |                 |
|                |              |  [address]              |                 |
+----------------+--------------+---------------------+-----------------+
 
 
 
 
 Care Team Providers
 
 
+-----------------------+------+-------------+
| Care Team Member Name | Role | Phone       |
+-----------------------+------+-------------+
 PCP  | Unavailable |
+-----------------------+------+-------------+
 
 
 
 Encounter Details
 
 
+--------+-----------+----------------------+----------------------+-------------+
| Date   | Type      | Department           | Care Team            | Description |
+--------+-----------+----------------------+----------------------+-------------+
| / | [facility]  |   [facility] |   Clint Maxwell   |             |
| 2000   | Encounter |  [facility] | F, MD  [address] |             |
|        |           |   [address]       |   [place]    |             |
|        |           | [place]      | [address]  [phone]  |             |
|        |           | [address]           |  [phone] (Fax)  |             |
|        |           | [phone]         |                      |             |
+--------+-----------+----------------------+----------------------+-------------+
 
 
 
 Social History
 
 
+----------------+-------+-----------+--------+------+
| Tobacco Use    | Types | Packs/Day | Years  | Date |
|                |       |           | Used   |      |
+----------------+-------+-----------+--------+------+
| Never Assessed |       |           |        |      |
+----------------+-------+-----------+--------+------+
 
 
 
+------------------+---------------+
| Sex Assigned at  | Date Recorded |
| Birth            |               |
+------------------+---------------+
| Not on file      |               |
+------------------+---------------+
 
 
 
+----------------+-------------+-------------+
| Job Start Date | Occupation  | Industry    |
+----------------+-------------+-------------+
| Not on file    | Not on file | Not on file |
+----------------+-------------+-------------+
 
 
 
+----------------+--------------+------------+
| Travel History | Travel Start | Travel End |
+----------------+--------------+------------+
 
 
 
 
+-------------------------------------+
| No recent travel history available. |
+-------------------------------------+
 documented as of this encounter
 
 Plan of Treatment
 
 
+--------+-----------+------------+----------------------+-------------------+
| Date   | Type      | Specialty  | Care Team            | Description       |
+--------+-----------+------------+----------------------+-------------------+
| / | Office    | Cardiology |   Tonia Wilson,    |                   |
|    | Visit     |            | ARNJESSICA  [address]   |                   |
|        |           |            | St  [place], [place]  |                   |
|        |           |            | [address]  [phone]  |                   |
|        |           |            |  [phone] (Fax)  |                   |
+--------+-----------+------------+----------------------+-------------------+
| / | Hospital  | Radiology  |   Popeye Townsend, |                   |
|    | Encounter |            |  MD [address] |                   |
|        |           |            |  [place],   |                   |
|        |           |            | [place] [address]             |                   |
|        |           |            | [phone]         |                   |
|        |           |            | [phone] (Fax)   |                   |
+--------+-----------+------------+----------------------+-------------------+
| / | Surgery   | Radiology  |   Popeye Townsend, | CV EP PPM SYSTEM  |
|    |           |            |  MD  [address] | IMPLANT           |
|        |           |            |  [place],   |                   |
|        |           |            | [place] [address]             |                   |
|        |           |            | [phone]         |                   |
|        |           |            | [phone] (Fax)   |                   |
+--------+-----------+------------+----------------------+-------------------+
| 02/10/ | Clinical  | Cardiology |                      |                   |
|    | Support   |            |                      |                   |
+--------+-----------+------------+----------------------+-------------------+
| / | Office    | Cardiology |   Tonia Wilson,    |                   |
|    | Visit     |            | ARNP  [address]   |                   |
|        |           |            | [place]  |                   |
|        |           |            | [address]  [phone]  |                   |
|        |           |            |  [phone] (Fax)  |                   |
+--------+-----------+------------+----------------------+-------------------+
| / | Off-Site  | Nephrology |   Marzena Moser  |                   |
|    | Visit     |            | DO ETIENNE  [address] [place] [address]      |                   |
|        |           |            | [address]      |                   |
|        |           |            | [place]      |                   |
|        |           |            | [address] [phone]  |                   |
|        |           |            |  [phone] (Fax)  |                   |
+--------+-----------+------------+----------------------+-------------------+
 documented as of this encounter
 
 Visit Diagnoses
 Not on filedocumented in this encounter

## 2020-01-08 NOTE — XMS
Encounter Summary
  Created on: 2020
 
 Viviana Ricci
 External Reference #: [id number]
 : 47
 Sex: Female
 
 Demographics
 
 
+-----------------------+----------------------+
| Address               | [address]      |
|                       | [place]  [address] |
+-----------------------+----------------------+
| Home Phone            | [phone]      |
+-----------------------+----------------------+
| Preferred Language    | Unknown              |
+-----------------------+----------------------+
| Marital Status        | Single               |
+-----------------------+----------------------+
| Baptism Affiliation | [id number]                 |
+-----------------------+----------------------+
| Race                  | Unknown              |
+-----------------------+----------------------+
| Ethnic Group          | Unknown              |
+-----------------------+----------------------+
 
 
 Author
 
 
+--------------+--------------------------------------------+
| Author       | [facility]  |
|              | and [place]                                |
+--------------+--------------------------------------------+
| Organization | [facility]  |
|              | and [place]                                |
+--------------+--------------------------------------------+
| Address      | Unknown                                    |
+--------------+--------------------------------------------+
| Phone        | Unavailable                                |
+--------------+--------------------------------------------+
 
 
 
 Support
 
 
+----------------+--------------+---------------------+-----------------+
| Name           | Relationship | Address             | Phone           |
+----------------+--------------+---------------------+-----------------+
| Ada/Ed Radhames | ECON         | [address]              | [phone] |
|                |              | [place]  |                 |
|                |              |  [address]              |                 |
+----------------+--------------+---------------------+-----------------+
 
 
 
 
 Care Team Providers
 
 
+-----------------------+------+-------------+
| Care Team Member Name | Role | Phone       |
+-----------------------+------+-------------+
 PCP  | Unavailable |
+-----------------------+------+-------------+
 
 
 
 Reason for Visit
 
 
+---------+----------+
| Reason  | Comments |
+---------+----------+
| Dysuria |          |
+---------+----------+
 
 
 
 Encounter Details
 
 
+--------+-----------+---------------------+----------------------+-------------+
| Date   | Type      | Department          | Care Team            | Description |
+--------+-----------+---------------------+----------------------+-------------+
| / | Telephone |   [facility]         |   Marzena Moser  | Dysuria     |
|    |           | NEPHROLOGY  [address]   | M, DO  [address]      |             |
|        |           | [address]   | [address]      |             |
|        |           | [place], [place]     | [place], [place]      |             |
|        |           | [address]          | [address]  [phone]  |             |
|        |           | [phone]        |  [phone] (Fax)  |             |
+--------+-----------+---------------------+----------------------+-------------+
 
 
 
 Social History
 
 
+--------------+-------+-----------+--------+------+
| Tobacco Use  | Types | Packs/Day | Years  | Date |
|              |       |           | Used   |      |
+--------------+-------+-----------+--------+------+
| Never Smoker |       |           |        |      |
+--------------+-------+-----------+--------+------+
 
 
 
+---------------------+---+---+---+
| Smokeless Tobacco:  |   |   |   |
| Never Used          |   |   |   |
+---------------------+---+---+---+
 
 
 
+---------------------------------------------------------------+
| Comments: some second hand smoke exposure, but fairly minimal |
 
+---------------------------------------------------------------+
 
 
 
+-------------+-------------+---------+----------+
| Alcohol Use | Drinks/Week | oz/Week | Comments |
+-------------+-------------+---------+----------+
| No          |             |         |          |
+-------------+-------------+---------+----------+
 
 
 
+------------------+---------------+
| Sex Assigned at  | Date Recorded |
| Birth            |               |
+------------------+---------------+
| Not on file      |               |
+------------------+---------------+
 
 
 
+----------------+-------------+-------------+
| Job Start Date | Occupation  | Industry    |
+----------------+-------------+-------------+
| Not on file    | Not on file | Not on file |
+----------------+-------------+-------------+
 
 
 
+----------------+--------------+------------+
| Travel History | Travel Start | Travel End |
+----------------+--------------+------------+
 
 
 
+-------------------------------------+
| No recent travel history available. |
+-------------------------------------+
 documented as of this encounter
 
 Plan of Treatment
 
 
+--------+-----------+------------+----------------------+-------------------+
| Date   | Type      | Specialty  | Care Team            | Description       |
+--------+-----------+------------+----------------------+-------------------+
| / | Office    | Cardiology |   Tonia Wilson,    |                   |
| 2020   | Visit     |            | ARNP  [address]   |                   |
|        |           |            | St  [place]  |                   |
|        |           |            | [address]  [phone]  |                   |
|        |           |            |  [phone] (Fax)  |                   |
+--------+-----------+------------+----------------------+-------------------+
| / | Hospital  | Radiology  |   Popeye Townsend, |                   |
|    | Encounter |            |  MD  [address] |                   |
|        |           |            |  St.  [place],   |                   |
|        |           |            | [place] [address]             |                   |
|        |           |            | [phone]         |                   |
|        |           |            | [phone] (Fax)   |                   |
+--------+-----------+------------+----------------------+-------------------+
| / | Surgery   | Radiology  |   Popeye Townsend, | CV EP PPM SYSTEM  |
 
|    |           |            |  MD  [address] | IMPLANT           |
|        |           |            |  St.  [place],   |                   |
|        |           |            | [place] [address]             |                   |
|        |           |            | [phone]         |                   |
|        |           |            | [phone] (Fax)   |                   |
+--------+-----------+------------+----------------------+-------------------+
| 02/10/ | Clinical  | Cardiology |                      |                   |
|    | Support   |            |                      |                   |
+--------+-----------+------------+----------------------+-------------------+
| / | Office    | Cardiology |   Katie Tonia,    |                   |
|    | Visit     |            | [facility]  [address]   |                   |
|        |           |            |   [place]  |                   |
|        |           |            | [address]  [phone]  |                   |
|        |           |            |  [phone] (Fax)  |                   |
+--------+-----------+------------+----------------------+-------------------+
| / | Off-Site  | Nephrology |   Marzena Moser  |                   |
|    | Visit     |            | DO ETIENNE  [address] [place] [address]      |                   |
|        |           |            | [address]      |                   |
|        |           |            | [place]      |                   |
|        |           |            | [address]  [phone]  |                   |
|        |           |            |  [phone] (Fax)  |                   |
+--------+-----------+------------+----------------------+-------------------+
 documented as of this encounter
 
 Procedures
 
 
+------------------+--------+------------+----------------------+----------------------+
| Procedure Name   | Priori | Date/Time  | Associated Diagnosis | Comments             |
|                  | ty     |            |                      |                      |
+------------------+--------+------------+----------------------+----------------------+
| CULTURE, URINE,  | Routin | 2018 |                      |   Results for this   |
| REFLEXIVE        | e      |            |                      | procedure are in the |
|                  |        |            |                      |  results section.    |
+------------------+--------+------------+----------------------+----------------------+
 documented in this encounter
 
 Results
 Culture, Urine, Reflexive (2018)
 
+-------------+-------------------+-----------+------------+--------------+
| Component   | Value             | Ref Range | Performed  | Pathologist  |
|             |                   |           | At         | Signature    |
+-------------+-------------------+-----------+------------+--------------+
| Urine       | >[id number]:   |           |            |              |
| Culture,    | Escherichia coli  |           |            |              |
| Comprehensi |                   |           |            |              |
| ve          |                   |           |            |              |
+-------------+-------------------+-----------+------------+--------------+
 
 
 
+----------+
| Specimen |
+----------+
| Urine    |
+----------+
 
 
 
 
+------------------+------------------+--------+----------------+
| Organism         | Antibiotic       | Method | Susceptibility |
+------------------+------------------+--------+----------------+
| Escherichia coli | Ampicillin       |        |   Sensitive    |
+------------------+------------------+--------+----------------+
| Escherichia coli | Cephalexin       |        |   Sensitive    |
+------------------+------------------+--------+----------------+
| Escherichia coli | Cefazolin        |        |   Sensitive    |
+------------------+------------------+--------+----------------+
| Escherichia coli | Ciprofloxacin    |        |   Sensitive    |
+------------------+------------------+--------+----------------+
| Escherichia coli | Levofloxacin     |        |   Sensitive    |
+------------------+------------------+--------+----------------+
| Escherichia coli | Tetracycline     |        |   Sensitive    |
+------------------+------------------+--------+----------------+
| Escherichia coli | Nitrofurantoin   |        |   Sensitive    |
+------------------+------------------+--------+----------------+
| Escherichia coli | Trimethoprim +   |        |   Sensitive    |
|                  | Sulfamethoxazole |        |                |
+------------------+------------------+--------+----------------+
 documented in this encounter
 
 Visit Diagnoses
 Not on filedocumented in this encounter

## 2020-01-08 NOTE — XMS
Encounter Summary
  Created on: 2020
 
 Viviana Ricci
 External Reference #: [id number]
 : 47
 Sex: Female
 
 Demographics
 
 
+-----------------------+----------------------+
| Address               | [address]      |
|                       | [place]  [address] |
+-----------------------+----------------------+
| Home Phone            | [phone]      |
+-----------------------+----------------------+
| Preferred Language    | Unknown              |
+-----------------------+----------------------+
| Marital Status        | Single               |
+-----------------------+----------------------+
| Jainism Affiliation | [id number]                 |
+-----------------------+----------------------+
| Race                  | Unknown              |
+-----------------------+----------------------+
| Ethnic Group          | Unknown              |
+-----------------------+----------------------+
 
 
 Author
 
 
+--------------+--------------------------------------------+
| Author       | [facility]  |
|              | and [place]                                |
+--------------+--------------------------------------------+
| Organization | [facility]  |
|              | and [place]                                |
+--------------+--------------------------------------------+
| Address      | Unknown                                    |
+--------------+--------------------------------------------+
| Phone        | Unavailable                                |
+--------------+--------------------------------------------+
 
 
 
 Support
 
 
+----------------+--------------+---------------------+-----------------+
| Name           | Relationship | Address             | Phone           |
+----------------+--------------+---------------------+-----------------+
| Aad/Ed Radhames | ECON         | [address]              | [phone] |
|                |              | [place]  |                 |
|                |              |  [address]              |                 |
+----------------+--------------+---------------------+-----------------+
 
 
 
 
 Care Team Providers
 
 
+-----------------------+------+-------------+
| Care Team Member Name | Role | Phone       |
+-----------------------+------+-------------+
 PCP  | Unavailable |
+-----------------------+------+-------------+
 
 
 
 Reason for Visit
 
 
+---------+----------+
| Reason  | Comments |
+---------+----------+
| Dysuria |          |
+---------+----------+
 
 
 
 Encounter Details
 
 
+--------+-----------+---------------------+----------------------+-------------+
| Date   | Type      | Department          | Care Team            | Description |
+--------+-----------+---------------------+----------------------+-------------+
| / | Telephone |   [facility]         |   Marzena Moser  | Dysuria     |
|    |           | NEPHROLOGY  [address]   | M, DO  [address]      |             |
|        |           | [address]   | [address]      |             |
|        |           | [place], [place]     | [place], [place]      |             |
|        |           | [address]          | [address]  [phone]  |             |
|        |           | [phone]        |  [phone] (Fax)  |             |
+--------+-----------+---------------------+----------------------+-------------+
 
 
 
 Social History
 
 
+--------------+-------+-----------+--------+------+
| Tobacco Use  | Types | Packs/Day | Years  | Date |
|              |       |           | Used   |      |
+--------------+-------+-----------+--------+------+
| Never Smoker |       |           |        |      |
+--------------+-------+-----------+--------+------+
 
 
 
+---------------------+---+---+---+
| Smokeless Tobacco:  |   |   |   |
| Never Used          |   |   |   |
+---------------------+---+---+---+
 
 
 
+---------------------------------------------------------------+
| Comments: some second hand smoke exposure, but fairly minimal |
 
+---------------------------------------------------------------+
 
 
 
+-------------+-------------+---------+----------+
| Alcohol Use | Drinks/Week | oz/Week | Comments |
+-------------+-------------+---------+----------+
| No          |             |         |          |
+-------------+-------------+---------+----------+
 
 
 
+------------------+---------------+
| Sex Assigned at  | Date Recorded |
| Birth            |               |
+------------------+---------------+
| Not on file      |               |
+------------------+---------------+
 
 
 
+----------------+-------------+-------------+
| Job Start Date | Occupation  | Industry    |
+----------------+-------------+-------------+
| Not on file    | Not on file | Not on file |
+----------------+-------------+-------------+
 
 
 
+----------------+--------------+------------+
| Travel History | Travel Start | Travel End |
+----------------+--------------+------------+
 
 
 
+-------------------------------------+
| No recent travel history available. |
+-------------------------------------+
 documented as of this encounter
 
 Plan of Treatment
 
 
+--------+-----------+------------+----------------------+-------------------+
| Date   | Type      | Specialty  | Care Team            | Description       |
+--------+-----------+------------+----------------------+-------------------+
| / | Office    | Cardiology |   Tonia Wilson,    |                   |
| 2020   | Visit     |            | ARNP  [address]   |                   |
|        |           |            | St  [place]  |                   |
|        |           |            | [address]  [phone]  |                   |
|        |           |            |  [phone] (Fax)  |                   |
+--------+-----------+------------+----------------------+-------------------+
| / | Hospital  | Radiology  |   Popeye Townsend, |                   |
|    | Encounter |            |  MD  [address] |                   |
|        |           |            |  St.  [place],   |                   |
|        |           |            | [place] [address]             |                   |
|        |           |            | [phone]         |                   |
|        |           |            | [phone] (Fax)   |                   |
+--------+-----------+------------+----------------------+-------------------+
| / | Surgery   | Radiology  |   Popeye Townsend, | CV EP PPM SYSTEM  |
 
|    |           |            |  MD  [address] | IMPLANT           |
|        |           |            |  St.  [place],   |                   |
|        |           |            | [place] [address]             |                   |
|        |           |            | [phone]         |                   |
|        |           |            | [phone] (Fax)   |                   |
+--------+-----------+------------+----------------------+-------------------+
| 02/10/ | Clinical  | Cardiology |                      |                   |
|    | Support   |            |                      |                   |
+--------+-----------+------------+----------------------+-------------------+
| / | Office    | Cardiology |   Katie Tonia,    |                   |
|    | Visit     |            | [facility]  [address]   |                   |
|        |           |            |   [place]  |                   |
|        |           |            | [address]  [phone]  |                   |
|        |           |            |  [phone] (Fax)  |                   |
+--------+-----------+------------+----------------------+-------------------+
| / | Off-Site  | Nephrology |   Marzena Moser  |                   |
|    | Visit     |            | DO ETIENNE  [address] [place] [address]      |                   |
|        |           |            | [address]      |                   |
|        |           |            | [place]      |                   |
|        |           |            | [address]  [phone]  |                   |
|        |           |            |  [phone] (Fax)  |                   |
+--------+-----------+------------+----------------------+-------------------+
 documented as of this encounter
 
 Procedures
 
 
+------------------+--------+------------+----------------------+----------------------+
| Procedure Name   | Priori | Date/Time  | Associated Diagnosis | Comments             |
|                  | ty     |            |                      |                      |
+------------------+--------+------------+----------------------+----------------------+
| CULTURE, URINE,  | Routin | 2018 |                      |   Results for this   |
| REFLEXIVE        | e      |            |                      | procedure are in the |
|                  |        |            |                      |  results section.    |
+------------------+--------+------------+----------------------+----------------------+
 documented in this encounter
 
 Results
 Culture, Urine, Reflexive (2018)
 
+-------------+-------------------+-----------+------------+--------------+
| Component   | Value             | Ref Range | Performed  | Pathologist  |
|             |                   |           | At         | Signature    |
+-------------+-------------------+-----------+------------+--------------+
| Urine       | >[id number]:   |           |            |              |
| Culture,    | Escherichia coli  |           |            |              |
| Comprehensi |                   |           |            |              |
| ve          |                   |           |            |              |
+-------------+-------------------+-----------+------------+--------------+
 
 
 
+----------+
| Specimen |
+----------+
| Urine    |
+----------+
 
 
 
 
+------------------+------------------+--------+----------------+
| Organism         | Antibiotic       | Method | Susceptibility |
+------------------+------------------+--------+----------------+
| Escherichia coli | Ampicillin       |        |   Sensitive    |
+------------------+------------------+--------+----------------+
| Escherichia coli | Cephalexin       |        |   Sensitive    |
+------------------+------------------+--------+----------------+
| Escherichia coli | Cefazolin        |        |   Sensitive    |
+------------------+------------------+--------+----------------+
| Escherichia coli | Ciprofloxacin    |        |   Sensitive    |
+------------------+------------------+--------+----------------+
| Escherichia coli | Levofloxacin     |        |   Sensitive    |
+------------------+------------------+--------+----------------+
| Escherichia coli | Tetracycline     |        |   Sensitive    |
+------------------+------------------+--------+----------------+
| Escherichia coli | Nitrofurantoin   |        |   Sensitive    |
+------------------+------------------+--------+----------------+
| Escherichia coli | Trimethoprim +   |        |   Sensitive    |
|                  | Sulfamethoxazole |        |                |
+------------------+------------------+--------+----------------+
 documented in this encounter
 
 Visit Diagnoses
 Not on filedocumented in this encounter

## 2020-01-08 NOTE — XMS
Encounter Summary
  Created on: 2020
 
 Viviana Ricci
 External Reference #: [id number]
 : 47
 Sex: Female
 
 Demographics
 
 
+-----------------------+----------------------+
| Address               | [address]      |
|                       | [place]  [address] |
+-----------------------+----------------------+
| Home Phone            | [phone]      |
+-----------------------+----------------------+
| Preferred Language    | Unknown              |
+-----------------------+----------------------+
| Marital Status        | Single               |
+-----------------------+----------------------+
| Jehovah's witness Affiliation | [id number]                 |
+-----------------------+----------------------+
| Race                  | Unknown              |
+-----------------------+----------------------+
| Ethnic Group          | Unknown              |
+-----------------------+----------------------+
 
 
 Author
 
 
+--------------+--------------------------------------------+
| Author       | [facility]  |
|              | and [place]                                |
+--------------+--------------------------------------------+
| Organization | [facility]  |
|              | and [place]                                |
+--------------+--------------------------------------------+
| Address      | Unknown                                    |
+--------------+--------------------------------------------+
| Phone        | Unavailable                                |
+--------------+--------------------------------------------+
 
 
 
 Support
 
 
+----------------+--------------+---------------------+-----------------+
| Name           | Relationship | Address             | Phone           |
+----------------+--------------+---------------------+-----------------+
| Ada/Ed Radhames | ECON         | [address]              | [phone] |
|                |              | [place]  |                 |
|                |              |  [address]              |                 |
+----------------+--------------+---------------------+-----------------+
 
 
 
 
 Care Team Providers
 
 
+-----------------------+------+-------------+
| Care Team Member Name | Role | Phone       |
+-----------------------+------+-------------+
 PCP  | Unavailable |
+-----------------------+------+-------------+
 
 
 
 Reason for Visit
 
 
+-------------------+----------+
| Reason            | Comments |
+-------------------+----------+
| Medication Refill |          |
+-------------------+----------+
 
 
 
 Encounter Details
 
 
+--------+--------+---------------------+----------------------+-------------------+
| Date   | Type   | Department          | Care Team            | Description       |
+--------+--------+---------------------+----------------------+-------------------+
| / | Refill |   [facility]         |   Marzena Moser  | Medication Refill |
|    |        | NEPHROLOGY  [address]   | M, DO  [address]      |                   |
|        |        | [address]   | [address]      |                   |
|        |        | [place], [place]     | [place], [place]      |                   |
|        |        | [address]          | [address]  [phone]  |                   |
|        |        | [phone]        |  [phone] (Fax)  |                   |
+--------+--------+---------------------+----------------------+-------------------+
 
 
 
 Social History
 
 
+--------------+-------+-----------+--------+------+
| Tobacco Use  | Types | Packs/Day | Years  | Date |
|              |       |           | Used   |      |
+--------------+-------+-----------+--------+------+
| Never Smoker |       |           |        |      |
+--------------+-------+-----------+--------+------+
 
 
 
+---------------------+---+---+---+
| Smokeless Tobacco:  |   |   |   |
| Never Used          |   |   |   |
+---------------------+---+---+---+
 
 
 
+---------------------------------------------------------------+
| Comments: some second hand smoke exposure, but fairly minimal |
 
+---------------------------------------------------------------+
 
 
 
+-------------+-------------+---------+----------+
| Alcohol Use | Drinks/Week | oz/Week | Comments |
+-------------+-------------+---------+----------+
| No          |             |         |          |
+-------------+-------------+---------+----------+
 
 
 
+------------------+---------------+
| Sex Assigned at  | Date Recorded |
| Birth            |               |
+------------------+---------------+
| Not on file      |               |
+------------------+---------------+
 
 
 
+----------------+-------------+-------------+
| Job Start Date | Occupation  | Industry    |
+----------------+-------------+-------------+
| Not on file    | Not on file | Not on file |
+----------------+-------------+-------------+
 
 
 
+----------------+--------------+------------+
| Travel History | Travel Start | Travel End |
+----------------+--------------+------------+
 
 
 
+-------------------------------------+
| No recent travel history available. |
+-------------------------------------+
 documented as of this encounter
 
 Plan of Treatment
 
 
+--------+-----------+------------+----------------------+-------------------+
| Date   | Type      | Specialty  | Care Team            | Description       |
+--------+-----------+------------+----------------------+-------------------+
| / | Office    | Cardiology |   HellalfredoTonia,    |                   |
|    | Visit     |            | ARNP  [address]   |                   |
|        |           |            | St  [place], [place]  |                   |
|        |           |            | [address]  [phone]  |                   |
|        |           |            |  [phone] (Fax)  |                   |
+--------+-----------+------------+----------------------+-------------------+
| / | Hospital  | Radiology  |   Popeye Townsend, |                   |
|    | Encounter |            |  MD  [address] |                   |
|        |           |            |  St.  [place],   |                   |
|        |           |            | [place] [address]             |                   |
|        |           |            | [phone]         |                   |
|        |           |            | [phone] (Fax)   |                   |
+--------+-----------+------------+----------------------+-------------------+
| / | Surgery   | Radiology  |   Popeye Townsend, | CV EP PPM SYSTEM  |
 
|    |           |            |  MD  [address] | IMPLANT           |
|        |           |            |  St.  [place],   |                   |
|        |           |            | [place] [address]             |                   |
|        |           |            | [phone]         |                   |
|        |           |            | [phone] (Fax)   |                   |
+--------+-----------+------------+----------------------+-------------------+
| 02/10/ | Clinical  | Cardiology |                      |                   |
|    | Support   |            |                      |                   |
+--------+-----------+------------+----------------------+-------------------+
| / | Office    | Cardiology |   Tonia Wilson,    |                   |
|    | Visit     |            | ARNP  [address]   |                   |
|        |           |            | [place]  |                   |
|        |           |            | [address]  [phone]  |                   |
|        |           |            |  [phone] (Fax)  |                   |
+--------+-----------+------------+----------------------+-------------------+
| / | Off-Site  | Nephrology |   Marzena Moser  |                   |
|    | Visit     |            | DO ETIENNE  [address] [place] [address]      |                   |
|        |           |            | [address]      |                   |
|        |           |            | [place]      |                   |
|        |           |            | [address]  [phone]  |                   |
|        |           |            |  [phone] (Fax)  |                   |
+--------+-----------+------------+----------------------+-------------------+
 documented as of this encounter
 
 Visit Diagnoses
 Not on filedocumented in this encounter

## 2020-01-08 NOTE — XMS
Encounter Summary
  Created on: 2020
 
 Viviana Ricci
 External Reference #: [id number]
 : 47
 Sex: Female
 
 Demographics
 
 
+-----------------------+----------------------+
| Address               | [address]      |
|                       | [place]  [address] |
+-----------------------+----------------------+
| Home Phone            | [phone]      |
+-----------------------+----------------------+
| Preferred Language    | Unknown              |
+-----------------------+----------------------+
| Marital Status        | Single               |
+-----------------------+----------------------+
| Mu-ism Affiliation | [id number]                 |
+-----------------------+----------------------+
| Race                  | Unknown              |
+-----------------------+----------------------+
| Ethnic Group          | Unknown              |
+-----------------------+----------------------+
 
 
 Author
 
 
+--------------+--------------------------------------------+
| Author       | [facility]  |
|              | and [facility]                                |
+--------------+--------------------------------------------+
| Organization | [facility]  |
|              | and [facility]                                |
+--------------+--------------------------------------------+
| Address      | Unknown                                    |
+--------------+--------------------------------------------+
| Phone        | Unavailable                                |
+--------------+--------------------------------------------+
 
 
 
 Support
 
 
+----------------+--------------+---------------------+-----------------+
| Name           | Relationship | Address             | Phone           |
+----------------+--------------+---------------------+-----------------+
| Ada/Ed Radhames | ECON         | [place]              | [phone] |
|                |              | [place]  |                 |
|                |              |  [address]              |                 |
+----------------+--------------+---------------------+-----------------+
 
 
 
 
 Care Team Providers
 
 
+-----------------------+------+-------------+
| Care Team Member Name | Role | Phone       |
+-----------------------+------+-------------+
 PCP  | Unavailable |
+-----------------------+------+-------------+
 
 
 
 Encounter Details
 
 
+--------+----------+---------------------+----------------------+-------------+
| Date   | Type     | Department          | Care Team            | Description |
+--------+----------+---------------------+----------------------+-------------+
| 09/15/ | Abstract |   [facility] [place]         |   Marzena Moser  |             |
| 2017   |          | NEPHROLOGY  [address]   | M, DO  [address]      |             |
|        |          | [address]   | [address]      |             |
|        |          | [place], [place]     | [place]      |             |
|        |          | [address]          | [address]  [phone]  |             |
|        |          | [phone]        |  [phone] (Fax)  |             |
+--------+----------+---------------------+----------------------+-------------+
 
 
 
 Social History
 
 
+--------------+-------+-----------+--------+------+
| Tobacco Use  | Types | Packs/Day | Years  | Date |
|              |       |           | Used   |      |
+--------------+-------+-----------+--------+------+
| Never Smoker |       |           |        |      |
+--------------+-------+-----------+--------+------+
 
 
 
+---------------------+---+---+---+
| Smokeless Tobacco:  |   |   |   |
| Never Used          |   |   |   |
+---------------------+---+---+---+
 
 
 
+---------------------------------------------------------------+
| Comments: some second hand smoke exposure, but fairly minimal |
+---------------------------------------------------------------+
 
 
 
+-------------+-------------+---------+----------+
| Alcohol Use | Drinks/Week | oz/Week | Comments |
+-------------+-------------+---------+----------+
| No          |             |         |          |
+-------------+-------------+---------+----------+
 
 
 
 
+------------------+---------------+
| Sex Assigned at  | Date Recorded |
| Birth            |               |
+------------------+---------------+
| Not on file      |               |
+------------------+---------------+
 
 
 
+----------------+-------------+-------------+
| Job Start Date | Occupation  | Industry    |
+----------------+-------------+-------------+
| Not on file    | Not on file | Not on file |
+----------------+-------------+-------------+
 
 
 
+----------------+--------------+------------+
| Travel History | Travel Start | Travel End |
+----------------+--------------+------------+
 
 
 
+-------------------------------------+
| No recent travel history available. |
+-------------------------------------+
 documented as of this encounter
 
 Progress Notes
 Juju Glass RN - 09/15/2017  8:57 AM PDTUrinalysis with 30 WBC, 3+ bacteria, 25 leukoc
ytes. Patient denies dysuria, fever or chills. Instructed to notify our office if she develo
ps any of these symptoms, she verbalized understanding.Electronically signed by Juju shankar RN at 09/15/2017  9:12 AM PDTdocumented in this encounter
 
 Plan of Treatment
 
 
+--------+-----------+------------+----------------------+-------------------+
| Date   | Type      | Specialty  | Care Team            | Description       |
+--------+-----------+------------+----------------------+-------------------+
| / | Office    | Cardiology |   Tonia Wilson,    |                   |
| 2020   | Visit     |            | ARNP  [address]   |                   |
|        |           |            | St  [place], [place]  |                   |
|        |           |            | [address]  [phone]  |                   |
|        |           |            |  [phone] (Fax)  |                   |
+--------+-----------+------------+----------------------+-------------------+
| / | Hospital  | Radiology  |   Popeye Townsend, |                   |
|    | Encounter |            |  MD  [address] |                   |
|        |           |            |  [place],   |                   |
|        |           |            | [place] [address]             |                   |
|        |           |            | [phone]         |                   |
|        |           |            | [phone] (Fax)   |                   |
+--------+-----------+------------+----------------------+-------------------+
| / | Surgery   | Radiology  |   Popeye Townsend, | CV EP PPM SYSTEM  |
|    |           |            |  MD  [address] | IMPLANT           |
|        |           |            |  [place],   |                   |
|        |           |            | [place] [address]             |                   |
|        |           |            | [phone]         |                   |
|        |           |            | [phone] (Fax)   |                   |
 
+--------+-----------+------------+----------------------+-------------------+
| 02/10/ | Clinical  | Cardiology |                      |                   |
|    | Support   |            |                      |                   |
+--------+-----------+------------+----------------------+-------------------+
| / | Office    | Cardiology |   Tonia Wilson,    |                   |
|    | Visit     |            | [facility]  [address]   |                   |
|        |           |            |   [place]  |                   |
|        |           |            | [address]  [phone]  |                   |
|        |           |            |  [phone] (Fax)  |                   |
+--------+-----------+------------+----------------------+-------------------+
| / | Off-Site  | Nephrology |   Marzena Moser  |                   |
|    | Visit     |            | DO [facility]  [address] [place] [address]      |                   |
|        |           |            | [address]      |                   |
|        |           |            | [place]      |                   |
|        |           |            | [address]  [phone]  |                   |
|        |           |            |  [phone] (Fax)  |                   |
+--------+-----------+------------+----------------------+-------------------+
 documented as of this encounter
 
 Procedures
 
 
+----------------------+--------+------------+----------------------+----------------------+
| Procedure Name       | Priori | Date/Time  | Associated Diagnosis | Comments             |
|                      | ty     |            |                      |                      |
+----------------------+--------+------------+----------------------+----------------------+
| EXTERNAL LAB: ALMA    | Routin | 2017 |                      |   Results for this   |
|                      | e      |            |                      | procedure are in the |
|                      |        |            |                      |  results section.    |
+----------------------+--------+------------+----------------------+----------------------+
| EXTERNAL LAB:        | Routin | 2017 |                      |   Results for this   |
| GLUCOSE              | e      |            |                      | procedure are in the |
|                      |        |            |                      |  results section.    |
+----------------------+--------+------------+----------------------+----------------------+
| EXTERNAL LAB: ALT    | Routin | 2017 |                      |   Results for this   |
|                      | e      |            |                      | procedure are in the |
|                      |        |            |                      |  results section.    |
+----------------------+--------+------------+----------------------+----------------------+
| EXTERNAL LAB: ELOY    | Routin | 2017 |                      |   Results for this   |
|                      | e      |            |                      | procedure are in the |
|                      |        |            |                      |  results section.    |
+----------------------+--------+------------+----------------------+----------------------+
| EXTERNAL LAB:        | Routin | 2017 |                      |   Results for this   |
| ALKALINE PHOSPHATASE | e      |            |                      | procedure are in the |
|                      |        |            |                      |  results section.    |
+----------------------+--------+------------+----------------------+----------------------+
| EXTERNAL LAB:        | Routin | 2017 |                      |   Results for this   |
| BILIRUBIN, TOTAL     | e      |            |                      | procedure are in the |
|                      |        |            |                      |  results section.    |
+----------------------+--------+------------+----------------------+----------------------+
| EXTERNAL LAB:        | Routin | 2017 |                      |   Results for this   |
| ALBUMIN              | e      |            |                      | procedure are in the |
|                      |        |            |                      |  results section.    |
+----------------------+--------+------------+----------------------+----------------------+
| EXTERNAL LAB:        | Routin | 2017 |                      |   Results for this   |
| PROTEIN, TOTAL       | e      |            |                      | procedure are in the |
|                      |        |            |                      |  results section.    |
+----------------------+--------+------------+----------------------+----------------------+
| EXTERNAL LAB:        | Routin | 2017 |                      |   Results for this   |
| PHOSPHORUS           | e      |            |                      | procedure are in the |
 
|                      |        |            |                      |  results section.    |
+----------------------+--------+------------+----------------------+----------------------+
| EXTERNAL LAB:        | Routin | 2017 |                      |   Results for this   |
| MAGNESIUM            | e      |            |                      | procedure are in the |
|                      |        |            |                      |  results section.    |
+----------------------+--------+------------+----------------------+----------------------+
| EXTERNAL LAB:        | Routin | 2017 |                      |   Results for this   |
| CALCIUM              | e      |            |                      | procedure are in the |
|                      |        |            |                      |  results section.    |
+----------------------+--------+------------+----------------------+----------------------+
| EXTERNAL LAB: CARBON | Routin | 2017 |                      |   Results for this   |
|  DIOXIDE             | e      |            |                      | procedure are in the |
|                      |        |            |                      |  results section.    |
+----------------------+--------+------------+----------------------+----------------------+
| EXTERNAL LAB:        | Routin | 2017 |                      |   Results for this   |
| CHLORIDE             | e      |            |                      | procedure are in the |
|                      |        |            |                      |  results section.    |
+----------------------+--------+------------+----------------------+----------------------+
| EXTERNAL LAB:        | Routin | 2017 |                      |   Results for this   |
| POTASSIUM            | e      |            |                      | procedure are in the |
|                      |        |            |                      |  results section.    |
+----------------------+--------+------------+----------------------+----------------------+
| EXTERNAL LAB: SODIUM | Routin | 2017 |                      |   Results for this   |
|                      | e      |            |                      | procedure are in the |
|                      |        |            |                      |  results section.    |
+----------------------+--------+------------+----------------------+----------------------+
| EXTERNAL LAB:        | Routin | 2017 |                      |   Results for this   |
| URINALYSIS           | e      |            |                      | procedure are in the |
|                      |        |            |                      |  results section.    |
+----------------------+--------+------------+----------------------+----------------------+
| EXTERNAL LAB: CBC    | Routin | 2017 |                      |   Results for this   |
|                      | e      |            |                      | procedure are in the |
|                      |        |            |                      |  results section.    |
+----------------------+--------+------------+----------------------+----------------------+
| EXTERNAL LAB: EGFR   | Routin | 2017 |                      |   Results for this   |
|                      | e      |            |                      | procedure are in the |
|                      |        |            |                      |  results section.    |
+----------------------+--------+------------+----------------------+----------------------+
| EXTERNAL LAB:        | Routin | 2017 |                      |   Results for this   |
| CREATININE           | e      |            |                      | procedure are in the |
|                      |        |            |                      |  results section.    |
+----------------------+--------+------------+----------------------+----------------------+
| URINALYSIS           | Routin | 2017 |                      |   Results for this   |
|                      | e      |            |                      | procedure are in the |
|                      |        |            |                      |  results section.    |
+----------------------+--------+------------+----------------------+----------------------+
| HEMOGLOBIN A1C       | Routin | 2017 |                      |   Results for this   |
|                      | e      |            |                      | procedure are in the |
|                      |        |            |                      |  results section.    |
+----------------------+--------+------------+----------------------+----------------------+
 documented in this encounter
 
 Results
 Urinalysis (2017)
 
+-------------+--------------+----------------+------------+--------------+
| Component   | Value        | Ref Range      | Performed  | Pathologist  |
|             |              |                | At         | Signature    |
+-------------+--------------+----------------+------------+--------------+
| WBC UA      | 30 (A)       | 0 - 4 /HPF     |            |              |
 
+-------------+--------------+----------------+------------+--------------+
| BACTERIA UA | 3+ (A)       | Negative /HPF  |            |              |
+-------------+--------------+----------------+------------+--------------+
| Color,      | Light Yellow | Light Yellow,  |            |              |
| Urine       |              | Yellow         |            |              |
+-------------+--------------+----------------+------------+--------------+
| Clarity     | Clear        |                |            |              |
+-------------+--------------+----------------+------------+--------------+
 
 
 
+----------+
| Specimen |
+----------+
| Urine    |
+----------+
 Hemoglobin A1C (2017)
 
+-------------+-------+-----------+------------+--------------+
| Component   | Value | Ref Range | Performed  | Pathologist  |
|             |       |           | At         | Signature    |
+-------------+-------+-----------+------------+--------------+
| Hemoglobin  | 7.7   |           | EXTERNAL   |              |
| A1c,        |       |           | LAB        |              |
| external    |       |           |            |              |
+-------------+-------+-----------+------------+--------------+
 
 
 
+----------+
| Specimen |
+----------+
| Blood    |
+----------+
 
 
 
+--------------------------+
| Resulting Agency Comment |
+--------------------------+
|   [facility]              |
+--------------------------+
 
 
 
+----------------+---------+--------------------+--------------+
| Performing     | Address | City/State/Zipcode | Phone Number |
| Organization   |         |                    |              |
+----------------+---------+--------------------+--------------+
|   EXTERNAL LAB |         |                    |              |
+----------------+---------+--------------------+--------------+
 External Lab: ALMA (2017)
 
+-----------+--------+-----------+------------+--------------+
| Component | Value  | Ref Range | Performed  | Pathologist  |
|           |        |           | At         | Signature    |
+-----------+--------+-----------+------------+--------------+
| BUN,      | 29 (A) | 6 - 23    | EXTERNAL   |              |
| External  |        |           | LAB        |              |
+-----------+--------+-----------+------------+--------------+
 
 
 
 
+--------------------------+
| Resulting Agency Comment |
+--------------------------+
|   [facility]              |
+--------------------------+
 
 
 
+----------------+---------+--------------------+--------------+
| Performing     | Address | City/State/Zipcode | Phone Number |
| Organization   |         |                    |              |
+----------------+---------+--------------------+--------------+
|   EXTERNAL LAB |         |                    |              |
+----------------+---------+--------------------+--------------+
 External Lab: Glucose (2017)
 
+-----------+---------+-----------+------------+--------------+
| Component | Value   | Ref Range | Performed  | Pathologist  |
|           |         |           | At         | Signature    |
+-----------+---------+-----------+------------+--------------+
| Glucose,  | 110 (A) | 70 - 100  | EXTERNAL   |              |
| External  |         |           | LAB        |              |
+-----------+---------+-----------+------------+--------------+
 
 
 
+--------------------------+
| Resulting Agency Comment |
+--------------------------+
|   [facility]              |
+--------------------------+
 
 
 
+----------------+---------+--------------------+--------------+
| Performing     | Address | City/State/Zipcode | Phone Number |
| Organization   |         |                    |              |
+----------------+---------+--------------------+--------------+
|   EXTERNAL LAB |         |                    |              |
+----------------+---------+--------------------+--------------+
 External Lab: ALT (2017)
 
+-----------+-------+-----------+------------+--------------+
| Component | Value | Ref Range | Performed  | Pathologist  |
|           |       |           | At         | Signature    |
+-----------+-------+-----------+------------+--------------+
| ALT,      | 15    | 7 - 52    | EXTERNAL   |              |
| External  |       |           | LAB        |              |
+-----------+-------+-----------+------------+--------------+
 
 
 
+--------------------------+
| Resulting Agency Comment |
+--------------------------+
|   [facility]              |
+--------------------------+
 
 
 
 
+----------------+---------+--------------------+--------------+
| Performing     | Address | City/State/Zipcode | Phone Number |
| Organization   |         |                    |              |
+----------------+---------+--------------------+--------------+
|   EXTERNAL LAB |         |                    |              |
+----------------+---------+--------------------+--------------+
 External Lab: AST (2017)
 
+-----------+-------+-----------+------------+--------------+
| Component | Value | Ref Range | Performed  | Pathologist  |
|           |       |           | At         | Signature    |
+-----------+-------+-----------+------------+--------------+
| AST,      | 24    | 13 - 39   | EXTERNAL   |              |
| External  |       |           | LAB        |              |
+-----------+-------+-----------+------------+--------------+
 
 
 
+--------------------------+
| Resulting Agency Comment |
+--------------------------+
|   [facility]              |
+--------------------------+
 
 
 
+----------------+---------+--------------------+--------------+
| Performing     | Address | City/State/Zipcode | Phone Number |
| Organization   |         |                    |              |
+----------------+---------+--------------------+--------------+
|   EXTERNAL LAB |         |                    |              |
+----------------+---------+--------------------+--------------+
 External Lab: Alkaline Phosphatase (2017)
 
+-----------+-------+-----------+------------+--------------+
| Component | Value | Ref Range | Performed  | Pathologist  |
|           |       |           | At         | Signature    |
+-----------+-------+-----------+------------+--------------+
| ALP,      | 106   | 31 - 130  | EXTERNAL   |              |
| External  |       |           | LAB        |              |
+-----------+-------+-----------+------------+--------------+
 
 
 
+--------------------------+
| Resulting Agency Comment |
+--------------------------+
|   [facility]              |
+--------------------------+
 
 
 
+----------------+---------+--------------------+--------------+
| Performing     | Address | City/State/Zipcode | Phone Number |
| Organization   |         |                    |              |
+----------------+---------+--------------------+--------------+
|   EXTERNAL LAB |         |                    |              |
 
+----------------+---------+--------------------+--------------+
 External Lab: Bilirubin, Total (2017)
 
+-------------+-------+-----------+------------+--------------+
| Component   | Value | Ref Range | Performed  | Pathologist  |
|             |       |           | At         | Signature    |
+-------------+-------+-----------+------------+--------------+
| Bilirubin,  | 0.7   | 0 - 1.2   | EXTERNAL   |              |
| Total,      |       |           | LAB        |              |
| External    |       |           |            |              |
+-------------+-------+-----------+------------+--------------+
 
 
 
+--------------------------+
| Resulting Agency Comment |
+--------------------------+
|   [facility]              |
+--------------------------+
 
 
 
+----------------+---------+--------------------+--------------+
| Performing     | Address | City/State/Zipcode | Phone Number |
| Organization   |         |                    |              |
+----------------+---------+--------------------+--------------+
|   EXTERNAL LAB |         |                    |              |
+----------------+---------+--------------------+--------------+
 External Lab: Albumin (2017)
 
+-----------+-------+-----------+------------+--------------+
| Component | Value | Ref Range | Performed  | Pathologist  |
|           |       |           | At         | Signature    |
+-----------+-------+-----------+------------+--------------+
| Albumin,  | 3.6   | 3.5 - 5   | EXTERNAL   |              |
| External  |       |           | LAB        |              |
+-----------+-------+-----------+------------+--------------+
 
 
 
+--------------------------+
| Resulting Agency Comment |
+--------------------------+
|   [facility]              |
+--------------------------+
 
 
 
+----------------+---------+--------------------+--------------+
| Performing     | Address | City/State/Zipcode | Phone Number |
| Organization   |         |                    |              |
+----------------+---------+--------------------+--------------+
|   EXTERNAL LAB |         |                    |              |
+----------------+---------+--------------------+--------------+
 External Lab: Protein, Total (2017)
 
+-----------+---------+-----------+------------+--------------+
| Component | Value   | Ref Range | Performed  | Pathologist  |
|           |         |           | At         | Signature    |
+-----------+---------+-----------+------------+--------------+
 
| Protein,  | 5.8 (A) | 6 - 8     | EXTERNAL   |              |
| Total,    |         |           | LAB        |              |
| External  |         |           |            |              |
+-----------+---------+-----------+------------+--------------+
 
 
 
+--------------------------+
| Resulting Agency Comment |
+--------------------------+
|   [facility]              |
+--------------------------+
 
 
 
+----------------+---------+--------------------+--------------+
| Performing     | Address | City/State/Zipcode | Phone Number |
| Organization   |         |                    |              |
+----------------+---------+--------------------+--------------+
|   EXTERNAL LAB |         |                    |              |
+----------------+---------+--------------------+--------------+
 External Lab: Phosphorus (2017)
 
+-------------+---------+-----------+------------+--------------+
| Component   | Value   | Ref Range | Performed  | Pathologist  |
|             |         |           | At         | Signature    |
+-------------+---------+-----------+------------+--------------+
| Phosphorus, | 2.4 (A) | 2.5 - 5   | EXTERNAL   |              |
|  External   |         |           | LAB        |              |
+-------------+---------+-----------+------------+--------------+
 
 
 
+--------------------------+
| Resulting Agency Comment |
+--------------------------+
|   [facility]              |
+--------------------------+
 
 
 
+----------------+---------+--------------------+--------------+
| Performing     | Address | City/State/Zipcode | Phone Number |
| Organization   |         |                    |              |
+----------------+---------+--------------------+--------------+
|   EXTERNAL LAB |         |                    |              |
+----------------+---------+--------------------+--------------+
 External Lab: Magnesium (2017)
 
+-------------+-------+-----------+------------+--------------+
| Component   | Value | Ref Range | Performed  | Pathologist  |
|             |       |           | At         | Signature    |
+-------------+-------+-----------+------------+--------------+
| Magnesium,  | 1.7   | 1.7 - 2.5 | EXTERNAL   |              |
| External    |       |           | LAB        |              |
+-------------+-------+-----------+------------+--------------+
 
 
 
+--------------------------+
 
| Resulting Agency Comment |
+--------------------------+
|   [facility]              |
+--------------------------+
 
 
 
+----------------+---------+--------------------+--------------+
| Performing     | Address | City/State/Zipcode | Phone Number |
| Organization   |         |                    |              |
+----------------+---------+--------------------+--------------+
|   EXTERNAL LAB |         |                    |              |
+----------------+---------+--------------------+--------------+
 External Lab: Calcium (2017)
 
+-----------+-------+-------------+------------+--------------+
| Component | Value | Ref Range   | Performed  | Pathologist  |
|           |       |             | At         | Signature    |
+-----------+-------+-------------+------------+--------------+
| Calcium,  | 9.7   | 8.4 - 10.25 | EXTERNAL   |              |
| External  |       |             | LAB        |              |
+-----------+-------+-------------+------------+--------------+
 
 
 
+--------------------------+
| Resulting Agency Comment |
+--------------------------+
|   [facility]              |
+--------------------------+
 
 
 
+----------------+---------+--------------------+--------------+
| Performing     | Address | City/State/Zipcode | Phone Number |
| Organization   |         |                    |              |
+----------------+---------+--------------------+--------------+
|   EXTERNAL LAB |         |                    |              |
+----------------+---------+--------------------+--------------+
 External Lab: Carbon Dioxide (2017)
 
+-----------+-------+-----------+------------+--------------+
| Component | Value | Ref Range | Performed  | Pathologist  |
|           |       |           | At         | Signature    |
+-----------+-------+-----------+------------+--------------+
| Carbon    | 19    | 19 - 31   | EXTERNAL   |              |
| Dioxide,  |       |           | LAB        |              |
| External  |       |           |            |              |
+-----------+-------+-----------+------------+--------------+
 
 
 
+--------------------------+
| Resulting Agency Comment |
+--------------------------+
|   [facility]              |
+--------------------------+
 
 
 
 
+----------------+---------+--------------------+--------------+
| Performing     | Address | City/State/Zipcode | Phone Number |
| Organization   |         |                    |              |
+----------------+---------+--------------------+--------------+
|   EXTERNAL LAB |         |                    |              |
+----------------+---------+--------------------+--------------+
 External Lab: Chloride (2017)
 
+------------+-------+-----------+------------+--------------+
| Component  | Value | Ref Range | Performed  | Pathologist  |
|            |       |           | At         | Signature    |
+------------+-------+-----------+------------+--------------+
| Chloride,  | 111   | 95 - 112  | EXTERNAL   |              |
| External   |       |           | LAB        |              |
+------------+-------+-----------+------------+--------------+
 
 
 
+--------------------------+
| Resulting Agency Comment |
+--------------------------+
|   [facility]              |
+--------------------------+
 
 
 
+----------------+---------+--------------------+--------------+
| Performing     | Address | City/State/Zipcode | Phone Number |
| Organization   |         |                    |              |
+----------------+---------+--------------------+--------------+
|   EXTERNAL LAB |         |                    |              |
+----------------+---------+--------------------+--------------+
 External Lab: Potassium (2017)
 
+-------------+-------+-----------+------------+--------------+
| Component   | Value | Ref Range | Performed  | Pathologist  |
|             |       |           | At         | Signature    |
+-------------+-------+-----------+------------+--------------+
| Potassium,  | 4.4   | 3.6 - 5.1 | EXTERNAL   |              |
| External    |       |           | LAB        |              |
+-------------+-------+-----------+------------+--------------+
 
 
 
+--------------------------+
| Resulting Agency Comment |
+--------------------------+
|   [facility]              |
+--------------------------+
 
 
 
+----------------+---------+--------------------+--------------+
| Performing     | Address | City/State/Zipcode | Phone Number |
| Organization   |         |                    |              |
+----------------+---------+--------------------+--------------+
|   EXTERNAL LAB |         |                    |              |
+----------------+---------+--------------------+--------------+
 External Lab: Sodium (2017)
 
 
+-----------+-------+-----------+------------+--------------+
| Component | Value | Ref Range | Performed  | Pathologist  |
|           |       |           | At         | Signature    |
+-----------+-------+-----------+------------+--------------+
| Sodium,   | 142   | 132 - 143 | EXTERNAL   |              |
| External  |       |           | LAB        |              |
+-----------+-------+-----------+------------+--------------+
 
 
 
+--------------------------+
| Resulting Agency Comment |
+--------------------------+
|   [facility]              |
+--------------------------+
 
 
 
+----------------+---------+--------------------+--------------+
| Performing     | Address | City/State/Zipcode | Phone Number |
| Organization   |         |                    |              |
+----------------+---------+--------------------+--------------+
|   EXTERNAL LAB |         |                    |              |
+----------------+---------+--------------------+--------------+
 External Lab: Urinalysis (2017)
 
+-------------+----------+-----------+------------+--------------+
| Component   | Value    | Ref Range | Performed  | Pathologist  |
|             |          |           | At         | Signature    |
+-------------+----------+-----------+------------+--------------+
| UA Blood,   | negative |           | EXTERNAL   |              |
| External    |          |           | LAB        |              |
+-------------+----------+-----------+------------+--------------+
| UA Glucose, | normal   |           | EXTERNAL   |              |
|  External   |          |           | LAB        |              |
+-------------+----------+-----------+------------+--------------+
| UA Ketones, | negative |           | EXTERNAL   |              |
|  External   |          |           | LAB        |              |
+-------------+----------+-----------+------------+--------------+
| UA Ph,      | 6        |           | EXTERNAL   |              |
| External    |          |           | LAB        |              |
+-------------+----------+-----------+------------+--------------+
| UA          | 25       |           | EXTERNAL   |              |
| Proteins,   |          |           | LAB        |              |
| External    |          |           |            |              |
+-------------+----------+-----------+------------+--------------+
| UA RBC,     | 0        |           | EXTERNAL   |              |
| External    |          |           | LAB        |              |
+-------------+----------+-----------+------------+--------------+
| UA Specific | 1.014    |           | EXTERNAL   |              |
|  Gravity,   |          |           | LAB        |              |
| External    |          |           |            |              |
+-------------+----------+-----------+------------+--------------+
| UA          | 25       |           | EXTERNAL   |              |
| Leukocyte   |          |           | LAB        |              |
| Esterase,   |          |           |            |              |
| External    |          |           |            |              |
+-------------+----------+-----------+------------+--------------+
 
 
 
 
+--------------------------+
| Resulting Agency Comment |
+--------------------------+
|   [facility]              |
+--------------------------+
 
 
 
+----------------+---------+--------------------+--------------+
| Performing     | Address | City/State/Zipcode | Phone Number |
| Organization   |         |                    |              |
+----------------+---------+--------------------+--------------+
|   EXTERNAL LAB |         |                    |              |
+----------------+---------+--------------------+--------------+
 External Lab: CBC (2017)
 
+-----------+---------+-----------+------------+--------------+
| Component | Value   | Ref Range | Performed  | Pathologist  |
|           |         |           | At         | Signature    |
+-----------+---------+-----------+------------+--------------+
| WBC,      | 5.3     | 4.5 - 11  | EXTERNAL   |              |
| External  |         |           | LAB        |              |
+-----------+---------+-----------+------------+--------------+
| HGB,      | 13.8    | 12 - 16   | EXTERNAL   |              |
| External  |         |           | LAB        |              |
+-----------+---------+-----------+------------+--------------+
| HCT,      | 41.8    | 35 - 45   | EXTERNAL   |              |
| External  |         |           | LAB        |              |
+-----------+---------+-----------+------------+--------------+
| PLT,      | 136 (A) | 140 - 440 | EXTERNAL   |              |
| External  |         |           | LAB        |              |
+-----------+---------+-----------+------------+--------------+
| RBC,      | 4.06    | 3.8 - 5.1 | EXTERNAL   |              |
| External  |         |           | LAB        |              |
+-----------+---------+-----------+------------+--------------+
| MCV,      | 103 (A) | 81 - 99   | EXTERNAL   |              |
| External  |         |           | LAB        |              |
+-----------+---------+-----------+------------+--------------+
| RDW,      | 14      | 10.5 - 15 | EXTERNAL   |              |
| External  |         |           | LAB        |              |
+-----------+---------+-----------+------------+--------------+
 
 
 
+--------------------------+
| Resulting Agency Comment |
+--------------------------+
|   [facility]              |
+--------------------------+
 
 
 
+----------------+---------+--------------------+--------------+
| Performing     | Address | City/State/Zipcode | Phone Number |
| Organization   |         |                    |              |
+----------------+---------+--------------------+--------------+
|   EXTERNAL LAB |         |                    |              |
+----------------+---------+--------------------+--------------+
 External Lab: eGFR (2017)
 
 
+-----------+-------+-----------+------------+--------------+
| Component | Value | Ref Range | Performed  | Pathologist  |
|           |       |           | At         | Signature    |
+-----------+-------+-----------+------------+--------------+
| eGFR,     | 60    |           | EXTERNAL   |              |
| External  |       |           | LAB        |              |
+-----------+-------+-----------+------------+--------------+
 
 
 
+----------+
| Specimen |
+----------+
| Blood    |
+----------+
 
 
 
+--------------------------+
| Resulting Agency Comment |
+--------------------------+
|   [facility]              |
+--------------------------+
 
 
 
+----------------+---------+--------------------+--------------+
| Performing     | Address | City/State/Zipcode | Phone Number |
| Organization   |         |                    |              |
+----------------+---------+--------------------+--------------+
|   EXTERNAL LAB |         |                    |              |
+----------------+---------+--------------------+--------------+
 External Lab: Creatinine (2017)
 
+-------------+-------+------------+------------+--------------+
| Component   | Value | Ref Range  | Performed  | Pathologist  |
|             |       |            | At         | Signature    |
+-------------+-------+------------+------------+--------------+
| Creatinine, | 0.93  | 0.7 - 1.18 | EXTERNAL   |              |
|  External   |       |            | LAB        |              |
+-------------+-------+------------+------------+--------------+
 
 
 
+----------+
| Specimen |
+----------+
| Blood    |
+----------+
 
 
 
+--------------------------+
| Resulting Agency Comment |
+--------------------------+
|   [facility]              |
+--------------------------+
 
 
 
 
+----------------+---------+--------------------+--------------+
| Performing     | Address | City/State/Zipcode | Phone Number |
| Organization   |         |                    |              |
+----------------+---------+--------------------+--------------+
|   EXTERNAL LAB |         |                    |              |
+----------------+---------+--------------------+--------------+
 documented in this encounter
 
 Visit Diagnoses
 Not on filedocumented in this encounter

## 2020-01-08 NOTE — XMS
Encounter Summary
  Created on: 2020
 
 Viviana Ricci
 External Reference #: [id number]
 : 47
 Sex: Female
 
 Demographics
 
 
+-----------------------+----------------------+
| Address               | [address]      |
|                       | [place]  [address] |
+-----------------------+----------------------+
| Home Phone            | [phone]      |
+-----------------------+----------------------+
| Preferred Language    | Unknown              |
+-----------------------+----------------------+
| Marital Status        | Single               |
+-----------------------+----------------------+
| Latter-day Affiliation | [id number]                 |
+-----------------------+----------------------+
| Race                  | Unknown              |
+-----------------------+----------------------+
| Ethnic Group          | Unknown              |
+-----------------------+----------------------+
 
 
 Author
 
 
+--------------+--------------------------------------------+
| Author       | [facility]  |
|              | and [place]                                |
+--------------+--------------------------------------------+
| Organization | [facility]  |
|              | and [place]                                |
+--------------+--------------------------------------------+
| Address      | Unknown                                    |
+--------------+--------------------------------------------+
| Phone        | Unavailable                                |
+--------------+--------------------------------------------+
 
 
 
 Support
 
 
+----------------+--------------+---------------------+-----------------+
| Name           | Relationship | Address             | Phone           |
+----------------+--------------+---------------------+-----------------+
| Ada/Ed Radhames | ECON         | [address]              | [phone] |
|                |              | [place]  |                 |
|                |              |  [id number]              |                 |
+----------------+--------------+---------------------+-----------------+
 
 
 
 
 Care Team Providers
 
 
+------------------------+------+-----------------+
| Care Team Member Name  | Role | Phone           |
+------------------------+------+-----------------+
| Marzena Moser DO | PCP  | [phone] |
+------------------------+------+-----------------+
 
 
 
 Reason for Visit
 Evaluate & Treat (Routine)
 
+------------+--------+------------+--------------+--------------+---------------+
| Status     | Reason | Specialty  | Diagnoses /  | Referred By  | Referred To   |
|            |        |            | Procedures   | Contact      | Contact       |
+------------+--------+------------+--------------+--------------+---------------+
| Authorized |        | Nephrology |   Diagnoses  |   Shanti  |   Stroemel,   |
|            |        |            |  Unspecified | Marzena M,   | Marzena M, DO |
|            |        |            |              | DO  [address] |   [address]    |
|            |        |            | complication |  [place]   |
|            |        |            |  of kidney   |  [address]  [place]  | [address]  [place]    |
|            |        |            | transplant   | [place]    | [place]     |
|            |        |            | FSGS (focal  | [address]        | [address]  Phone: |
|            |        |            | segmental    | Phone:       |  [phone] |
|            |        |            | glomeruloscl | [phone] |   Fax:        |
|            |        |            | erosis)      |   Fax:       | [phone]  |
|            |        |            | Hypertension | [phone] |               |
|            |        |            | , essential  |              |               |
|            |        |            |  Procedures  |              |               |
|            |        |            |  RI OFFICE   |              |               |
|            |        |            | OUTPATIENT   |              |               |
|            |        |            | VISIT 25     |              |               |
|            |        |            | MINUTES      |              |               |
+------------+--------+------------+--------------+--------------+---------------+
 
 
 
 
 Encounter Details
 
 
+--------+-----------+---------------------+----------------------+----------------------+
| Date   | Type      | Department          | Care Team            | Description          |
+--------+-----------+---------------------+----------------------+----------------------+
| / | Off-Site  |   [facility]         |   Marzena Moser  | Kidney replaced by   |
|    | Visit     | NEPHROLOGY  [address]   | M, DO  [address]      | transplant (Primary  |
|        |           | [address]   | [place]      | Dx); Type 2 diabetes |
|        |           | [place], [place]     | [place]      |  mellitus with       |
|        |           | [address]          | [address]  [phone]  | chronic kidney       |
|        |           | [phone]        |  [phone] (Fax)  | disease, without     |
|        |           |                     |                      | long-term current    |
|        |           |                     |                      | use of insulin,      |
|        |           |                     |                      | unspecified CKD      |
|        |           |                     |                      | stage (HCC); FSGS    |
|        |           |                     |                      | (focal segmental     |
|        |           |                     |                      | glomerulosclerosis); |
|        |           |                     |                      |  Hypertension,       |
 
|        |           |                     |                      | essential            |
+--------+-----------+---------------------+----------------------+----------------------+
 
 
 
 Social History
 
 
+--------------+-------+-----------+--------+------+
| Tobacco Use  | Types | Packs/Day | Years  | Date |
|              |       |           | Used   |      |
+--------------+-------+-----------+--------+------+
| Never Smoker |       |           |        |      |
+--------------+-------+-----------+--------+------+
 
 
 
+---------------------+---+---+---+
| Smokeless Tobacco:  |   |   |   |
| Never Used          |   |   |   |
+---------------------+---+---+---+
 
 
 
+---------------------------------------------------------------+
| Comments: some second hand smoke exposure, but fairly minimal |
+---------------------------------------------------------------+
 
 
 
+-------------+-------------+---------+----------+
| Alcohol Use | Drinks/Week | oz/Week | Comments |
+-------------+-------------+---------+----------+
| No          |             |         |          |
+-------------+-------------+---------+----------+
 
 
 
+------------------+---------------+
| Sex Assigned at  | Date Recorded |
| Birth            |               |
+------------------+---------------+
| Not on file      |               |
+------------------+---------------+
 
 
 
+----------------+-------------+-------------+
| Job Start Date | Occupation  | Industry    |
+----------------+-------------+-------------+
| Not on file    | Not on file | Not on file |
+----------------+-------------+-------------+
 
 
 
+----------------+--------------+------------+
| Travel History | Travel Start | Travel End |
+----------------+--------------+------------+
 
 
 
 
+-------------------------------------+
| No recent travel history available. |
+-------------------------------------+
 documented as of this encounter
 
 Last Filed Vital Signs
 
 
+-------------------+-------------------+----------------------+----------+
| Vital Sign        | Reading           | Time Taken           | Comments |
+-------------------+-------------------+----------------------+----------+
| Blood Pressure    | 132/82            | 2019  4:18 PM  |          |
|                   |                   | PDT                  |          |
+-------------------+-------------------+----------------------+----------+
| Pulse             | -                 | -                    |          |
+-------------------+-------------------+----------------------+----------+
| Temperature       | 36   C (96.8   F) | 2019  4:18 PM  |          |
|                   |                   | PDT                  |          |
+-------------------+-------------------+----------------------+----------+
| Respiratory Rate  | -                 | -                    |          |
+-------------------+-------------------+----------------------+----------+
| Oxygen Saturation | -                 | -                    |          |
+-------------------+-------------------+----------------------+----------+
| Inhaled Oxygen    | -                 | -                    |          |
| Concentration     |                   |                      |          |
+-------------------+-------------------+----------------------+----------+
| Weight            | 116.1 kg (256 lb) | 2019  4:18 PM  |          |
|                   |                   | PDT                  |          |
+-------------------+-------------------+----------------------+----------+
| Height            | -                 | -                    |          |
+-------------------+-------------------+----------------------+----------+
| Body Mass Index   | 41.32             | 2018  2:31 PM  |          |
|                   |                   | PDT                  |          |
+-------------------+-------------------+----------------------+----------+
 documented in this encounter
 
 Progress Notes
 Vicky Urias, Medical Student - 2019  4:00 PM PDTFormatting of this note might be d
ifferent from the original.
 Subjective: NEPHROLOGY  
  
 Patient ID: Viviana Ricci is a 73 y.o. female.
 
 HPI Comments: 
 Followup for this  73 YOWF   who is s/p a renal allograft, 05, at [facility] with remote all
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
 hyperlipidemia, SHPTH,  previous low back pain, morbid obesity/JACK, chronic pulmonary  hype
rtension, historically related to centripetal obesity, and  CAD, s/p CABG x3 vessels,. 
 
 She states that she has drastically changed her eating habits to address her weight problem
, and she is now referring to it as an "unhealthy addiction."  She looked into going to  PhD
 counselor, but it was too expensive.    She states that she has given up "pop, candy, karoline
late, cake, baked things with white flour and white sugar."   As a result, she states that s
he is eating one meal per day. She admits that it has diminished her PO fluid intake somewha
t.
 Her spot urine for Pro/Cr was not done for unclear reasons?
 
 
 
 
 MEDS:
 
 Prograf 1 mg, BID
 Mycophenolate 250 mg, BID.
 Prednisone 5 mg, daily.
 Outpatient Medications Marked as Taking for the 19 encounter (Off-Site Visit) with Rosa Moser, DO 
 Medication Sig Dispense Refill 
   allopurinol (ZYLOPRIM) 100 mg tablet take 1 tablet by mouth once daily 30 tablet 11 
   aspirin 81 mg EC tablet Take 81 mg by mouth Daily.   
   B-D INS SYRINGE 0.5CC/31GX5/16 31G X 5/16" 0.5 ML MISC USE BEFORE MEALS AS DIRECTED 1 e
ach 11 
   cholecalciferol (VITAMIN D-3) 2000 UNITS TABS Take 5,000 Units by mouth Every other day
.   
   cinacalcet (SENSIPAR) 30 mg tablet take 1 tablet by mouth once daily 90 tablet 3 
   cyclobenzaprine (FLEXERIL) 10 mg tablet Take 1 tablet by mouth Twice  daily as needed f
or Muscle spasms. 45 tablet 0 
   fludrocortisone (FLORINEF) 0.1 mg tablet Take 1 tablet by mouth Every other day. 45 tab
let 3 
   furosemide (LASIX) 40 mg tablet Take 1 tablet by mouth Daily as needed. 30 tablet 11 
   glucose blood VI test strips (ONE TOUCH ULTRA TEST) strip Check blood sugar before each
 meal and as directed 100 each 12 
   insulin glargine (LANTUS) 100 units/mL injection (vial) inject 20 units subcutaneously 
every morning 10 vial 11 
   insulin lispro (HUMALOG) 100 units/mL injection (vial) inject subcutaneously BEFORE CHIKA
LS ACCORDING TO SLIDING SCALE Max dose 20 units daily 10 vial 11 
   levothyroxine (SYNTHROID) 50 mcg tablet take 1 tablet by mouth once daily 30 tablet 11 
   lisinopril (PRINIVIL,ZESTRIL) 30 MG tablet take 1 tablet by mouth once daily 90 tablet 
3 
   loperamide (ANTI-DIARRHEAL) 2 mg capsule Take 1 capsule by mouth 4 times daily as neede
d. 60 capsule 5 
   losartan (COZAAR) 50 mg tablet take 1 tablet by mouth once daily 90 tablet 3 
   magnesium oxide (MAG-OX) 400 mg tablet take 1 tablet by mouth twice a day 60 tablet 11 
   metoprolol tartrate (LOPRESSOR) 25 mg tablet take 1 tablet by mouth twice a day 60 tabl
et 11 
   mycophenolate (CELLCEPT) 250 mg capsule Take 1 capsule by mouth 2 times daily. 60 capsu
le 11 
   [DISCONTINUED] omeprazole (PRILOSEC) 20 mg capsule Take 20 mg by mouth every morning (b
efore breakfast).   
   Prenatal Vit-Fe Fumarate-FA (PNV PRENATAL PLUS MULTIVITAMIN) 27-1 MG TABS take 1 tablet
 by mouth once daily. 30 tablet 11 
   QVAR REDIHALER 80 MCG/ACT inhaler    
   Respiratory Therapy Supplies MISC Continue nocturnal O2 at 2 l/m through CPAP for lifet
bart. Dx: JACK G47.33 Please provide new nasal mask for CPAP and any other needed replacement 
supplies. 1 each 0 
   Respiratory Therapy Supplies MISC Decrease CPAP to 12-18 cmH2O Diagnosis Code(s)327.23.
 Please send order to [facility]. 1 each 0 
   rosuvastatin (CRESTOR) 20 mg tablet take 1 tablet by mouth NIGHTLY 30 tablet 11 
  
 
 No Known Allergies 
 
 Objective: /82  | Temp 36 C (96.8 F)  | Wt 116.1 kg (256 lb)  | BMI 41.32 kg/m 
  
 
 Physical Exam
 HEENT: No thrush.
 Heart:  Regular rate and rhythm with no S3, S4, murmur or rub.
 Lungs:  CTA bilaterally.  No rales or wheezes.
 Abdomen:  soft, obese, renal allograft in the RLQ is nontender,  NABS.
 
 Extremities: no edema,  clubbing, or cyanosis. No foot ulcers.
 
 Lab Results 
 Component Value Date 
  NAEX 144 2019 
  KEX 5.1 2019 
  CLEX 110 2019 
  CO2EX 22 2019 
  BUNEX 42 2019 
  CREEX 1.47 2019 
  EGFREX 35 2019 
  GLUEX 89 2019 
  PHOSEX 2.9 2019 
  MGEX 2.1 2019 
  PTHEX 193.0 (A) 2016 
  ENE1XMY 6.4 2019 
 
 Lab Results 
 Component Value Date 
  CHOLEX 125 2019 
  HDLEX 51 2019 
  LDLEX 51 2019 
  TRIGEX 115 2019 
 
 Lab Results 
 Component Value Date 
  WBCEX 5.6 2019 
  HGBEX 13.9 2019 
  HCTEX 42.6 2019 
  PLTEX 176 2019 
 
 Lab Results 
 Component Value Date 
  TACROLIMUSEX 4.5 2019 
 
 Urine Pro/Cr ratio = 
 Lab Results 
 Component Value Date 
  PROTEX 0.250 (A) 2019 
 
 Assessment: 
 
 1.   Renal Allograft, 05 -- by her Hx and BUN:Cr ration, she may be volume contracted 
to explain the increased SCr?
 2.   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
 3.   Type 2 DM, requiring insuline-- excellent control.
 4.   Hyperlipidemia--stable on rosuvastatin.
 5.   Hypertension--  good control.
 6.   SHPTH-- needs repeat PTH?
 7.   Morbid Obesity/JACK/Pulmonary hypertension-- her weight is improved. Currently on O2, a
gree for her to continue. 
 8.   CAD, s/p CABG  3 vessels, --stable.
 9.   Type IV RTA--compensated.
 10.  Chronic Low Back pain--in remission.
 11.   chronic DJD, left knee--  same. 
 
 Plan: 
 
 1.  Will have her DC her lasix and increase her PO intake of salt and H2O.  Her decreased f
ood intake may have slowed her enterohepatic recirculation of the 
 
 tacrolimus some. However, I encouraged Viviana that her weight and HbA1c are clearly better.
 2.  Will increase her Prograf to 1.5 mg, AM and 1 mg, PM.
 3.  She will recheck the Prograf level and Urine Pro/Cr ration in one week, then, do her St
anding Order Q 3 mo.
 4.  Will plan to see her back in 4 months.  She will have her Standing Order, drug level, a
nd  Urine Pro/Cr ratio done one week prior to that, and again Q 3mo.
 
 Electronically signed by Marzena Moser DO. 19 16:22 
 
 CC:      Jose Daivd Dalal M.D., [facility], [facility]
            Andres Wilson ARNPElectronically signed by Marzena Moser DO at 2019 
 2:19 PM PST
 Associated attestation - Marzena Moser DO - 2019  2:19 PM PSTRENAL ATTENDING
 I have participated in the care of this patient and I have reviewed and agree with all pert
inent clinical information above including history, exam, and recommendations.
 
 Electronically signed by: Marzena Moser DO, 2019 2:19 PM 
 documented in this encounter
 
 Plan of Treatment
 
 
+--------+-----------+------------+----------------------+-------------------+
| Date   | Type      | Specialty  | Care Team            | Description       |
+--------+-----------+------------+----------------------+-------------------+
| / | Office    | Cardiology |   Tonia Wilson,    |                   |
|    | Visit     |            | ARNP  [address]   |                   |
|        |           |            | [place], [place]  |                   |
|        |           |            | [id number] [phone]  |                   |
|        |           |            |  [phone] (Fax)  |                   |
+--------+-----------+------------+----------------------+-------------------+
| / | Hospital  | Radiology  |   Popeye Townsend, |                   |
|    | Encounter |            |  MD  [address] |                   |
|        |           |            |  St.  [place],   |                   |
|        |           |            | [place] [address]             |                   |
|        |           |            | [phone]         |                   |
|        |           |            | [phone] (Fax)   |                   |
+--------+-----------+------------+----------------------+-------------------+
| / | Surgery   | Radiology  |   Popeye Townsend, | CV EP PPM SYSTEM  |
|    |           |            |  MD  [address] | IMPLANT           |
|        |           |            |  St.  [place],   |                   |
|        |           |            | [place] [address]             |                   |
|        |           |            | [phone]         |                   |
|        |           |            | [phone] (Fax)   |                   |
+--------+-----------+------------+----------------------+-------------------+
| 02/10/ | Clinical  | Cardiology |                      |                   |
|    | Support   |            |                      |                   |
+--------+-----------+------------+----------------------+-------------------+
| / | Office    | Cardiology |   Tonia Wilson,    |                   |
|    | Visit     |            | ARNP  [address]   |                   |
|        |           |            | St  [place], [place]  |                   |
|        |           |            | [address]  [phone]  |                   |
|        |           |            |  [phone] (Fax)  |                   |
+--------+-----------+------------+----------------------+-------------------+
| / | Off-Site  | Nephrology |   Marzena Moser  |                   |
|    | Visit     |            | DO [facility]TI[facility]  [address]      |                   |
|        |           |            | [address]      |                   |
|        |           |            | [place]      |                   |
|        |           |            | [address]  [phone]  |                   |
 
|        |           |            |  [phone] (Fax)  |                   |
+--------+-----------+------------+----------------------+-------------------+
 
 
 
+---------------------+------+--------+----------------------+----------------------+
| Name                | Type | Priori | Associated Diagnoses | Order Schedule       |
|                     |      | ty     |                      |                      |
+---------------------+------+--------+----------------------+----------------------+
| Comprehensive       | Lab  | Routin |   Kidney replaced by | Every 4 weeks for 22 |
| Metabolic Panel     |      | e      |  transplant  Type 2  |  Occurrences         |
|                     |      |        | diabetes mellitus    | starting 2019  |
|                     |      |        | with chronic kidney  | until 2020     |
|                     |      |        | disease, without     |                      |
|                     |      |        | long-term current    |                      |
|                     |      |        | use of insulin,      |                      |
|                     |      |        | unspecified CKD      |                      |
|                     |      |        | stage (HCC)  FSGS    |                      |
|                     |      |        | (focal segmental     |                      |
|                     |      |        | glomerulosclerosis)  |                      |
|                     |      |        |  Hypertension,       |                      |
|                     |      |        | essential            |                      |
+---------------------+------+--------+----------------------+----------------------+
| Phosphorus          | Lab  | Routin |   Kidney replaced by | Every 4 weeks for 22 |
|                     |      | e      |  transplant  Type 2  |  Occurrences         |
|                     |      |        | diabetes mellitus    | starting 2019  |
|                     |      |        | with chronic kidney  | until 2020     |
|                     |      |        | disease, without     |                      |
|                     |      |        | long-term current    |                      |
|                     |      |        | use of insulin,      |                      |
|                     |      |        | unspecified CKD      |                      |
|                     |      |        | stage (HCC)  FSGS    |                      |
|                     |      |        | (focal segmental     |                      |
|                     |      |        | glomerulosclerosis)  |                      |
|                     |      |        |  Hypertension,       |                      |
|                     |      |        | essential            |                      |
+---------------------+------+--------+----------------------+----------------------+
| Magnesium           | Lab  | Routin |   Kidney replaced by | Every 4 weeks for 22 |
|                     |      | e      |  transplant  Type 2  |  Occurrences         |
|                     |      |        | diabetes mellitus    | starting 2019  |
|                     |      |        | with chronic kidney  | until 2020     |
|                     |      |        | disease, without     |                      |
|                     |      |        | long-term current    |                      |
|                     |      |        | use of insulin,      |                      |
|                     |      |        | unspecified CKD      |                      |
|                     |      |        | stage (HCC)  FSGS    |                      |
|                     |      |        | (focal segmental     |                      |
|                     |      |        | glomerulosclerosis)  |                      |
|                     |      |        |  Hypertension,       |                      |
|                     |      |        | essential            |                      |
+---------------------+------+--------+----------------------+----------------------+
| Protein/Creatinine  | Lab  | Routin |   Kidney replaced by | Every 4 weeks for 22 |
| Ratio, Urine        |      | e      |  transplant  Type 2  |  Occurrences         |
|                     |      |        | diabetes mellitus    | starting 2019  |
|                     |      |        | with chronic kidney  | until 2020     |
|                     |      |        | disease, without     |                      |
|                     |      |        | long-term current    |                      |
|                     |      |        | use of insulin,      |                      |
|                     |      |        | unspecified CKD      |                      |
|                     |      |        | stage (HCC)  FSGS    |                      |
 
|                     |      |        | (focal segmental     |                      |
|                     |      |        | glomerulosclerosis)  |                      |
|                     |      |        |  Hypertension,       |                      |
|                     |      |        | essential            |                      |
+---------------------+------+--------+----------------------+----------------------+
| Tacrolimus Trough,  | Lab  | Routin |   Kidney replaced by | Weekly for 52        |
| LC-MS/MS            |      | e      |  transplant  Type 2  | Occurrences starting |
|                     |      |        | diabetes mellitus    |  2019 until    |
|                     |      |        | with chronic kidney  | 2020           |
|                     |      |        | disease, without     |                      |
|                     |      |        | long-term current    |                      |
|                     |      |        | use of insulin,      |                      |
|                     |      |        | unspecified CKD      |                      |
|                     |      |        | stage (HCC)  FSGS    |                      |
|                     |      |        | (focal segmental     |                      |
|                     |      |        | glomerulosclerosis)  |                      |
|                     |      |        |  Hypertension,       |                      |
|                     |      |        | essential            |                      |
+---------------------+------+--------+----------------------+----------------------+
 documented as of this encounter
 
 Procedures
 
 
+----------------------+--------+-------------+----------------------+----------------------
+
| Procedure Name       | Priori | Date/Time   | Associated Diagnosis | Comments             
|
|                      | ty     |             |                      |                      
|
+----------------------+--------+-------------+----------------------+----------------------
+
| LABS - EXTERNAL SCAN |        | 2019  |                      |   Results for this   
|
|                      |        | 12:00 AM    |                      | procedure are in the 
|
|                      |        | PDT         |                      |  results section.    
|
+----------------------+--------+-------------+----------------------+----------------------
+
| LABS - EXTERNAL SCAN |        | 2019  |                      |   Results for this   
|
|                      |        | 12:00 AM    |                      | procedure are in the 
|
|                      |        | PDT         |                      |  results section.    
|
+----------------------+--------+-------------+----------------------+----------------------
+
| EXTERNAL LAB:        | Routin | 2019  |                      |   Results for this   
|
| HEMOGLOBIN A1C       | e      |             |                      | procedure are in the 
|
|                      |        |             |                      |  results section.    
|
+----------------------+--------+-------------+----------------------+----------------------
+
| EXTERNAL LAB:        | Routin | 2019  |                      |   Results for this   
|
| TACROLIMUS LEVEL,    | e      |             |                      | procedure are in the 
|
 
| LC-MS/MS             |        |             |                      |  results section.    
|
+----------------------+--------+-------------+----------------------+----------------------
+
 documented in this encounter
 
 Results
 LABS - EXTERNAL SCAN (2019 12:00 AM PDT)
 
+------------------------------------------------------------------------+--------------+
| Narrative                                                              | Performed At |
+------------------------------------------------------------------------+--------------+
|   This result has an attachment that is not available.  Ordered by an  |              |
| unspecified provider.                                                  |              |
+------------------------------------------------------------------------+--------------+
 LABS - EXTERNAL SCAN (2019 12:00 AM PDT)
 
+------------------------------------------------------------------------+--------------+
| Narrative                                                              | Performed At |
+------------------------------------------------------------------------+--------------+
|   This result has an attachment that is not available.  Ordered by an  |              |
| unspecified provider.                                                  |              |
+------------------------------------------------------------------------+--------------+
 External Lab: Hemoglobin A1c (2019)
 
+-------------+-------+-----------+------------+--------------+
| Component   | Value | Ref Range | Performed  | Pathologist  |
|             |       |           | At         | Signature    |
+-------------+-------+-----------+------------+--------------+
| Hemoglobin  | 6.4   | %         |            |              |
| A1c,        |       |           |            |              |
| external    |       |           |            |              |
+-------------+-------+-----------+------------+--------------+
 
 
 
+----------+
| Specimen |
+----------+
| Blood    |
+----------+
 External Lab: Tacrolimus Level, LC-MS/MS (2019)
 
+-------------+-------+-----------+------------+--------------+
| Component   | Value | Ref Range | Performed  | Pathologist  |
|             |       |           | At         | Signature    |
+-------------+-------+-----------+------------+--------------+
| Tacrolimus, | 4.5   | ng/ml     |            |              |
|  LC-MS/MS,  |       |           |            |              |
| External    |       |           |            |              |
+-------------+-------+-----------+------------+--------------+
 
 
 
+----------+
| Specimen |
+----------+
|          |
+----------+
 documented in this encounter
 
 
 Visit Diagnoses
 
 
+------------------------------------------------------------------------------------------+
| Diagnosis                                                                                |
+------------------------------------------------------------------------------------------+
|   Kidney replaced by transplant - Primary                                                |
+------------------------------------------------------------------------------------------+
|   Type 2 diabetes mellitus with chronic kidney disease, without long-term current use of |
|  insulin, unspecified CKD stage (HCC)                                                    |
+------------------------------------------------------------------------------------------+
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
| membranous glomerulonephritis                                                            |
+------------------------------------------------------------------------------------------+
|   Hypertension, essential  Unspecified essential hypertension                            |
+------------------------------------------------------------------------------------------+
 documented in this encounter

## 2020-01-08 NOTE — XMS
Encounter Summary
  Created on: 2020
 
 Viviana Ricci
 External Reference #: [id number]
 : 47
 Sex: Female
 
 Demographics
 
 
+-----------------------+----------------------+
| Address               | [address]      |
|                       | [place]  [address] |
+-----------------------+----------------------+
| Home Phone            | [phone]      |
+-----------------------+----------------------+
| Preferred Language    | Unknown              |
+-----------------------+----------------------+
| Marital Status        | Single               |
+-----------------------+----------------------+
| Taoist Affiliation | [id number]                 |
+-----------------------+----------------------+
| Race                  | Unknown              |
+-----------------------+----------------------+
| Ethnic Group          | Unknown              |
+-----------------------+----------------------+
 
 
 Author
 
 
+--------------+--------------------------------------------+
| Author       | [facility]  |
|              | and [facility]                                |
+--------------+--------------------------------------------+
| Organization | [facility]  |
|              | and [facility]                                |
+--------------+--------------------------------------------+
| Address      | Unknown                                    |
+--------------+--------------------------------------------+
| Phone        | Unavailable                                |
+--------------+--------------------------------------------+
 
 
 
 Support
 
 
+----------------+--------------+---------------------+-----------------+
| Name           | Relationship | Address             | Phone           |
+----------------+--------------+---------------------+-----------------+
| Ada/Ed Radhames | ECON         | [place]              | [phone] |
|                |              | [place]  |                 |
|                |              |  [id number]              |                 |
+----------------+--------------+---------------------+-----------------+
 
 
 
 
 Care Team Providers
 
 
+-----------------------+------+-------------+
| Care Team Member Name | Role | Phone       |
+-----------------------+------+-------------+
 PCP  | Unavailable |
+-----------------------+------+-------------+
 
 
 
 Reason for Visit
 
 
+-------------------+----------+
| Reason            | Comments |
+-------------------+----------+
| Medication Refill |          |
+-------------------+----------+
 
 
 
 Encounter Details
 
 
+--------+--------+---------------------+----------------------+-------------------+
| Date   | Type   | Department          | Care Team            | Description       |
+--------+--------+---------------------+----------------------+-------------------+
| / | Refill |   [facility]         |   Marzena Moser  | Medication Refill |
|    |        | NEPHROLOGY  [address]   | M, DO  [address]      |                   |
|        |        | [address]   | [address]      |                   |
|        |        | [place], [place]     | [place], [place]      |                   |
|        |        | [address]          | [address]  [phone]  |                   |
|        |        | [phone]        |  [phone] (Fax)  |                   |
+--------+--------+---------------------+----------------------+-------------------+
 
 
 
 Social History
 
 
+--------------+-------+-----------+--------+------+
| Tobacco Use  | Types | Packs/Day | Years  | Date |
|              |       |           | Used   |      |
+--------------+-------+-----------+--------+------+
| Never Smoker |       |           |        |      |
+--------------+-------+-----------+--------+------+
 
 
 
+---------------------+---+---+---+
| Smokeless Tobacco:  |   |   |   |
| Never Used          |   |   |   |
+---------------------+---+---+---+
 
 
 
+---------------------------------------------------------------+
| Comments: some second hand smoke exposure, but fairly minimal |
 
+---------------------------------------------------------------+
 
 
 
+-------------+-------------+---------+----------+
| Alcohol Use | Drinks/Week | oz/Week | Comments |
+-------------+-------------+---------+----------+
| No          |             |         |          |
+-------------+-------------+---------+----------+
 
 
 
+------------------+---------------+
| Sex Assigned at  | Date Recorded |
| Birth            |               |
+------------------+---------------+
| Not on file      |               |
+------------------+---------------+
 
 
 
+----------------+-------------+-------------+
| Job Start Date | Occupation  | Industry    |
+----------------+-------------+-------------+
| Not on file    | Not on file | Not on file |
+----------------+-------------+-------------+
 
 
 
+----------------+--------------+------------+
| Travel History | Travel Start | Travel End |
+----------------+--------------+------------+
 
 
 
+-------------------------------------+
| No recent travel history available. |
+-------------------------------------+
 documented as of this encounter
 
 Plan of Treatment
 
 
+--------+-----------+------------+----------------------+-------------------+
| Date   | Type      | Specialty  | Care Team            | Description       |
+--------+-----------+------------+----------------------+-------------------+
| / | Office    | Cardiology |   HellalfredoTonia,    |                   |
|    | Visit     |            | ARNP  [address]   |                   |
|        |           |            | St  [place], [place]  |                   |
|        |           |            | [address]  [phone]  |                   |
|        |           |            |  [phone] (Fax)  |                   |
+--------+-----------+------------+----------------------+-------------------+
| / | Hospital  | Radiology  |   Popeye Townsend, |                   |
|    | Encounter |            |  MD  [address] |                   |
|        |           |            |  St.  [place],   |                   |
|        |           |            | [place] [address]             |                   |
|        |           |            | [phone]         |                   |
|        |           |            | [phone] (Fax)   |                   |
+--------+-----------+------------+----------------------+-------------------+
| / | Surgery   | Radiology  |   Popeye Towsnend, | CV EP PPM SYSTEM  |
 
|    |           |            |  MD  [address] | IMPLANT           |
|        |           |            |  St.  [place],   |                   |
|        |           |            | [place] [address]             |                   |
|        |           |            | [phone]         |                   |
|        |           |            | [phone] (Fax)   |                   |
+--------+-----------+------------+----------------------+-------------------+
| 02/10/ | Clinical  | Cardiology |                      |                   |
|    | Support   |            |                      |                   |
+--------+-----------+------------+----------------------+-------------------+
| / | Office    | Cardiology |   Tonia Wilson,    |                   |
|    | Visit     |            | ARNP  [address]   |                   |
|        |           |            | [place]  |                   |
|        |           |            | [address]  [phone]  |                   |
|        |           |            |  [phone] (Fax)  |                   |
+--------+-----------+------------+----------------------+-------------------+
| / | Off-Site  | Nephrology |   Marzena Moser  |                   |
|    | Visit     |            | DO ETIENNE  [address] [place] [address]      |                   |
|        |           |            | [address]      |                   |
|        |           |            | [place]      |                   |
|        |           |            | [address]  [phone]  |                   |
|        |           |            |  [phone] (Fax)  |                   |
+--------+-----------+------------+----------------------+-------------------+
 documented as of this encounter
 
 Visit Diagnoses
 
 
+------------------------------------------------------------------------------------------+
| Diagnosis                                                                                |
+------------------------------------------------------------------------------------------+
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
| stage IV, or unspecified(403.90) - Primary  Unspecified hypertensive kidney disease with |
|  chronic kidney disease stage I through stage IV, or unspecified                         |
+------------------------------------------------------------------------------------------+
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
| membranous glomerulonephritis                                                            |
+------------------------------------------------------------------------------------------+
|   Hyperlipidemia  Other and unspecified hyperlipidemia                                   |
+------------------------------------------------------------------------------------------+
 documented in this encounter

## 2020-01-08 NOTE — XMS
Encounter Summary
  Created on: 2020
 
 Viviana Ricci
 External Reference #: [id number]
 : 47
 Sex: Female
 
 Demographics
 
 
+-----------------------+----------------------+
| Address               | [address]      |
|                       | [place]  [address] |
+-----------------------+----------------------+
| Home Phone            | [phone]      |
+-----------------------+----------------------+
| Preferred Language    | Unknown              |
+-----------------------+----------------------+
| Marital Status        | Single               |
+-----------------------+----------------------+
| Faith Affiliation | [id number]                 |
+-----------------------+----------------------+
| Race                  | Unknown              |
+-----------------------+----------------------+
| Ethnic Group          | Unknown              |
+-----------------------+----------------------+
 
 
 Author
 
 
+--------------+--------------------------------------------+
| Author       | [facility]  |
|              | and [place]                                |
+--------------+--------------------------------------------+
| Organization | [facility] [place]  |
|              | and [place]                                |
+--------------+--------------------------------------------+
| Address      | Unknown                                    |
+--------------+--------------------------------------------+
| Phone        | Unavailable                                |
+--------------+--------------------------------------------+
 
 
 
 Support
 
 
+----------------+--------------+---------------------+-----------------+
| Name           | Relationship | Address             | Phone           |
+----------------+--------------+---------------------+-----------------+
| Ada/Ed Radhames | ECON         | [address]              | [phone] |
|                |              | [place]  |                 |
|                |              |  [address]              |                 |
+----------------+--------------+---------------------+-----------------+
 
 
 
 
 Care Team Providers
 
 
+------------------------+------+-----------------+
| Care Team Member Name  | Role | Phone           |
+------------------------+------+-----------------+
| Marzena Moser DO | PCP  | [phone] |
+------------------------+------+-----------------+
 
 
 
 Reason for Visit
 
 
+-------------------+----------+
| Reason            | Comments |
+-------------------+----------+
| Medication Refill |          |
+-------------------+----------+
 
 
 
 Encounter Details
 
 
+--------+--------+---------------------+----------------------+-------------------+
| Date   | Type   | Department          | Care Team            | Description       |
+--------+--------+---------------------+----------------------+-------------------+
| / | Refill |   [facility]         |   Marzena Moser  | Medication Refill |
| 2017   |        | NEPHROLOGY  [address]   | M, DO  [address]      |                   |
|        |        | [address]   | [address]      |                   |
|        |        | [place], [place]     | [place], [place]      |                   |
|        |        | [address]          | [address]  [phone]  |                   |
|        |        | [phone]        |  [phone] (Fax)  |                   |
+--------+--------+---------------------+----------------------+-------------------+
 
 
 
 Social History
 
 
+--------------+-------+-----------+--------+------+
| Tobacco Use  | Types | Packs/Day | Years  | Date |
|              |       |           | Used   |      |
+--------------+-------+-----------+--------+------+
| Never Smoker |       |           |        |      |
+--------------+-------+-----------+--------+------+
 
 
 
+---------------------+---+---+---+
| Smokeless Tobacco:  |   |   |   |
| Never Used          |   |   |   |
+---------------------+---+---+---+
 
 
 
+---------------------------------------------------------------+
| Comments: some second hand smoke exposure, but fairly minimal |
 
+---------------------------------------------------------------+
 
 
 
+-------------+-------------+---------+----------+
| Alcohol Use | Drinks/Week | oz/Week | Comments |
+-------------+-------------+---------+----------+
| No          |             |         |          |
+-------------+-------------+---------+----------+
 
 
 
+------------------+---------------+
| Sex Assigned at  | Date Recorded |
| Birth            |               |
+------------------+---------------+
| Not on file      |               |
+------------------+---------------+
 
 
 
+----------------+-------------+-------------+
| Job Start Date | Occupation  | Industry    |
+----------------+-------------+-------------+
| Not on file    | Not on file | Not on file |
+----------------+-------------+-------------+
 
 
 
+----------------+--------------+------------+
| Travel History | Travel Start | Travel End |
+----------------+--------------+------------+
 
 
 
+-------------------------------------+
| No recent travel history available. |
+-------------------------------------+
 documented as of this encounter
 
 Plan of Treatment
 
 
+--------+-----------+------------+----------------------+-------------------+
| Date   | Type      | Specialty  | Care Team            | Description       |
+--------+-----------+------------+----------------------+-------------------+
| / | Office    | Cardiology |   Tnoia Wilson,    |                   |
|    | Visit     |            | ARNP  [address]   |                   |
|        |           |            | St [place], [place]  |                   |
|        |           |            | [address]  [phone]  |                   |
|        |           |            |  [phone] (Fax)  |                   |
+--------+-----------+------------+----------------------+-------------------+
| / | Hospital  | Radiology  |   Popeye Townsend, |                   |
|    | Encounter |            |  MD  [address] |                   |
|        |           |            |  [place],   |                   |
|        |           |            | [place] [address]             |                   |
|        |           |            | [phone]         |                   |
|        |           |            | [phone] (Fax)   |                   |
+--------+-----------+------------+----------------------+-------------------+
| / | Surgery   | Radiology  |   Popeye Townsend, | CV EP PPM SYSTEM  |
 
|    |           |            |  MD  [address] | IMPLANT           |
|        |           |            |  [place],   |                   |
|        |           |            | [place] [address]             |                   |
|        |           |            | [phone]         |                   |
|        |           |            | [phone] (Fax)   |                   |
+--------+-----------+------------+----------------------+-------------------+
| 02/10/ | Clinical  | Cardiology |                      |                   |
|    | Support   |            |                      |                   |
+--------+-----------+------------+----------------------+-------------------+
| / | Office    | Cardiology |   RakeshTonia andre,    |                   |
|    | Visit     |            | ARNP  [address]   |                   |
|        |           |            | [place]  |                   |
|        |           |            | [address] [phone]  |                   |
|        |           |            |  [phone] (Fax)  |                   |
+--------+-----------+------------+----------------------+-------------------+
| / | Off-Site  | Nephrology |   Marzena Moser  |                   |
|    | Visit     |            | DO ETIENNE  [address] [place] [address]      |                   |
|        |           |            | [address]      |                   |
|        |           |            | [place]      |                   |
|        |           |            | [address] [phone]  |                   |
|        |           |            |  [phone] (Fax)  |                   |
+--------+-----------+------------+----------------------+-------------------+
 documented as of this encounter
 
 Visit Diagnoses
 Not on filedocumented in this encounter

## 2020-01-08 NOTE — XMS
Encounter Summary
  Created on: 2020
 
 Viviana Ricci
 External Reference #: [id number]
 : 47
 Sex: Female
 
 Demographics
 
 
+-----------------------+----------------------+
| Address               | [address]      |
|                       | [place]  [address] |
+-----------------------+----------------------+
| Home Phone            | [phone]      |
+-----------------------+----------------------+
| Preferred Language    | Unknown              |
+-----------------------+----------------------+
| Marital Status        | Single               |
+-----------------------+----------------------+
| Alevism Affiliation | [id number]                 |
+-----------------------+----------------------+
| Race                  | Unknown              |
+-----------------------+----------------------+
| Ethnic Group          | Unknown              |
+-----------------------+----------------------+
 
 
 Author
 
 
+--------------+--------------------------------------------+
| Author       | [facility] [place]  |
|              | and [place]                                |
+--------------+--------------------------------------------+
| Organization | [facility] [place]  |
|              | and [place]                                |
+--------------+--------------------------------------------+
| Address      | Unknown                                    |
+--------------+--------------------------------------------+
| Phone        | Unavailable                                |
+--------------+--------------------------------------------+
 
 
 
 Support
 
 
+----------------+--------------+---------------------+-----------------+
| Name           | Relationship | Address             | Phone           |
+----------------+--------------+---------------------+-----------------+
| Ada/Ed Radhames | ECON         | [address]              | [phone] |
|                |              | [place]  |                 |
|                |              |  [address]              |                 |
+----------------+--------------+---------------------+-----------------+
 
 
 
 
 Care Team Providers
 
 
+-----------------------+------+-------------+
| Care Team Member Name | Role | Phone       |
+-----------------------+------+-------------+
 PCP  | Unavailable |
+-----------------------+------+-------------+
 
 
 
 Reason for Visit
 
 
+-------------------+----------+
| Reason            | Comments |
+-------------------+----------+
| Medication Refill |          |
+-------------------+----------+
 
 
 
 Encounter Details
 
 
+--------+--------+---------------------+----------------------+-------------------+
| Date   | Type   | Department          | Care Team            | Description       |
+--------+--------+---------------------+----------------------+-------------------+
| / | Refill |   [place]         |   Marzena Moser  | Medication Refill |
|    |        | NEPHROLOGY  [address]   | M, DO  [address]      |                   |
|        |        | [address]   | [address]      |                   |
|        |        | [place], [place]     | [place], [place]      |                   |
|        |        | [address]          | [address]  [phone]  |                   |
|        |        | [phone]        |  [phone] (Fax)  |                   |
+--------+--------+---------------------+----------------------+-------------------+
 
 
 
 Social History
 
 
+--------------+-------+-----------+--------+------+
| Tobacco Use  | Types | Packs/Day | Years  | Date |
|              |       |           | Used   |      |
+--------------+-------+-----------+--------+------+
| Never Smoker |       |           |        |      |
+--------------+-------+-----------+--------+------+
 
 
 
+---------------------+---+---+---+
| Smokeless Tobacco:  |   |   |   |
| Never Used          |   |   |   |
+---------------------+---+---+---+
 
 
 
+---------------------------------------------------------------+
| Comments: some second hand smoke exposure, but fairly minimal |
 
+---------------------------------------------------------------+
 
 
 
+-------------+-------------+---------+----------+
| Alcohol Use | Drinks/Week | oz/Week | Comments |
+-------------+-------------+---------+----------+
| No          |             |         |          |
+-------------+-------------+---------+----------+
 
 
 
+------------------+---------------+
| Sex Assigned at  | Date Recorded |
| Birth            |               |
+------------------+---------------+
| Not on file      |               |
+------------------+---------------+
 
 
 
+----------------+-------------+-------------+
| Job Start Date | Occupation  | Industry    |
+----------------+-------------+-------------+
| Not on file    | Not on file | Not on file |
+----------------+-------------+-------------+
 
 
 
+----------------+--------------+------------+
| Travel History | Travel Start | Travel End |
+----------------+--------------+------------+
 
 
 
+-------------------------------------+
| No recent travel history available. |
+-------------------------------------+
 documented as of this encounter
 
 Plan of Treatment
 
 
+--------+-----------+------------+----------------------+-------------------+
| Date   | Type      | Specialty  | Care Team            | Description       |
+--------+-----------+------------+----------------------+-------------------+
| / | Office    | Cardiology |   HellalfredoTonia,    |                   |
|    | Visit     |            | ARNP  [address]   |                   |
|        |           |            | St  [place], [place]  |                   |
|        |           |            | [address]  [phone]  |                   |
|        |           |            |  [phone] (Fax)  |                   |
+--------+-----------+------------+----------------------+-------------------+
| / | Hospital  | Radiology  |   Popeye Townsend, |                   |
|    | Encounter |            |  MD  [address] |                   |
|        |           |            |  St.  [place],   |                   |
|        |           |            | [place] [address]             |                   |
|        |           |            | [phone]         |                   |
|        |           |            | [phone] (Fax)   |                   |
+--------+-----------+------------+----------------------+-------------------+
| / | Surgery   | Radiology  |   Popeye Townsend, | CV EP PPM SYSTEM  |
 
|    |           |            |  MD  [address] | IMPLANT           |
|        |           |            |  St.  [place],   |                   |
|        |           |            | [place] [address]             |                   |
|        |           |            | [phone]         |                   |
|        |           |            | [phone] (Fax)   |                   |
+--------+-----------+------------+----------------------+-------------------+
| 02/10/ | Clinical  | Cardiology |                      |                   |
|    | Support   |            |                      |                   |
+--------+-----------+------------+----------------------+-------------------+
| / | Office    | Cardiology |   Tonia Wilson,    |                   |
|    | Visit     |            | ARNP  [address]   |                   |
|        |           |            | [place]  |                   |
|        |           |            | [address]  [phone]  |                   |
|        |           |            |  [phone] (Fax)  |                   |
+--------+-----------+------------+----------------------+-------------------+
| / | Off-Site  | Nephrology |   Marzena Moser  |                   |
|    | Visit     |            | DO ETIENNE  [address] [place] [address]      |                   |
|        |           |            | [address]      |                   |
|        |           |            | [place]      |                   |
|        |           |            | [address]  [phone]  |                   |
|        |           |            |  [phone] (Fax)  |                   |
+--------+-----------+------------+----------------------+-------------------+
 documented as of this encounter
 
 Visit Diagnoses
 
 
+------------------------------------------------------------------------+
| Diagnosis                                                              |
+------------------------------------------------------------------------+
|   Essential hypertension - Primary  Unspecified essential hypertension |
+------------------------------------------------------------------------+
 documented in this encounter

## 2020-01-08 NOTE — XMS
Encounter Summary
  Created on: 2020
 
 Viviana Ricci
 External Reference #: [id number]
 : 47
 Sex: Female
 
 Demographics
 
 
+-----------------------+----------------------+
| Address               | [address]      |
|                       | [place]  [address] |
+-----------------------+----------------------+
| Home Phone            | [phone]      |
+-----------------------+----------------------+
| Preferred Language    | Unknown              |
+-----------------------+----------------------+
| Marital Status        | Single               |
+-----------------------+----------------------+
| Jewish Affiliation | [id number]                 |
+-----------------------+----------------------+
| Race                  | Unknown              |
+-----------------------+----------------------+
| Ethnic Group          | Unknown              |
+-----------------------+----------------------+
 
 
 Author
 
 
+--------------+--------------------------------------------+
| Author       | [facility]  |
|              | and [place]                                |
+--------------+--------------------------------------------+
| Organization | [facility]  |
|              | and [place]                                |
+--------------+--------------------------------------------+
| Address      | Unknown                                    |
+--------------+--------------------------------------------+
| Phone        | Unavailable                                |
+--------------+--------------------------------------------+
 
 
 
 Support
 
 
+----------------+--------------+---------------------+-----------------+
| Name           | Relationship | Address             | Phone           |
+----------------+--------------+---------------------+-----------------+
| Ada/Ed Radhames | ECON         | [place]              | [phone] |
|                |              | [place]  |                 |
|                |              |  [address]              |                 |
+----------------+--------------+---------------------+-----------------+
 
 
 
 
 Care Team Providers
 
 
+-----------------------+------+-------------+
| Care Team Member Name | Role | Phone       |
+-----------------------+------+-------------+
 PCP  | Unavailable |
+-----------------------+------+-------------+
 
 
 
 Reason for Visit
 
 
+-------------------+----------+
| Reason            | Comments |
+-------------------+----------+
| Medication Refill |          |
+-------------------+----------+
 
 
 
 Encounter Details
 
 
+--------+--------+---------------------+----------------------+-------------------+
| Date   | Type   | Department          | Care Team            | Description       |
+--------+--------+---------------------+----------------------+-------------------+
| / | Refill |   [facility]         |   Marzena Moser  | Medication Refill |
|    |        | NEPHROLOGY  [address]   | M, DO  [address]      |                   |
|        |        | [address]   | [address]      |                   |
|        |        | [place], [place]     | [place], [place]      |                   |
|        |        | [address]          | [address]  [phone]  |                   |
|        |        | [phone]        |  [phone] (Fax)  |                   |
+--------+--------+---------------------+----------------------+-------------------+
 
 
 
 Social History
 
 
+--------------+-------+-----------+--------+------+
| Tobacco Use  | Types | Packs/Day | Years  | Date |
|              |       |           | Used   |      |
+--------------+-------+-----------+--------+------+
| Never Smoker |       |           |        |      |
+--------------+-------+-----------+--------+------+
 
 
 
+---------------------+---+---+---+
| Smokeless Tobacco:  |   |   |   |
| Never Used          |   |   |   |
+---------------------+---+---+---+
 
 
 
+---------------------------------------------------------------+
| Comments: some second hand smoke exposure, but fairly minimal |
 
+---------------------------------------------------------------+
 
 
 
+-------------+-------------+---------+----------+
| Alcohol Use | Drinks/Week | oz/Week | Comments |
+-------------+-------------+---------+----------+
| No          |             |         |          |
+-------------+-------------+---------+----------+
 
 
 
+------------------+---------------+
| Sex Assigned at  | Date Recorded |
| Birth            |               |
+------------------+---------------+
| Not on file      |               |
+------------------+---------------+
 
 
 
+----------------+-------------+-------------+
| Job Start Date | Occupation  | Industry    |
+----------------+-------------+-------------+
| Not on file    | Not on file | Not on file |
+----------------+-------------+-------------+
 
 
 
+----------------+--------------+------------+
| Travel History | Travel Start | Travel End |
+----------------+--------------+------------+
 
 
 
+-------------------------------------+
| No recent travel history available. |
+-------------------------------------+
 documented as of this encounter
 
 Plan of Treatment
 
 
+--------+-----------+------------+----------------------+-------------------+
| Date   | Type      | Specialty  | Care Team            | Description       |
+--------+-----------+------------+----------------------+-------------------+
| / | Office    | Cardiology |   HellalfredoTonia,    |                   |
|    | Visit     |            | ARNP  [address]   |                   |
|        |           |            | St  [place], [place]  |                   |
|        |           |            | [address]  [phone]  |                   |
|        |           |            |  [phone] (Fax)  |                   |
+--------+-----------+------------+----------------------+-------------------+
| / | Hospital  | Radiology  |   Popeye Townsend, |                   |
|    | Encounter |            |  MD  [address] |                   |
|        |           |            |  St.  [place],   |                   |
|        |           |            | [place] [address]             |                   |
|        |           |            | [phone]         |                   |
|        |           |            | [phone] (Fax)   |                   |
+--------+-----------+------------+----------------------+-------------------+
| / | Surgery   | Radiology  |   Popeye Townsend, | CV EP PPM SYSTEM  |
 
|    |           |            |  MD  [address] | IMPLANT           |
|        |           |            |  St.  [place],   |                   |
|        |           |            | [place] [address]             |                   |
|        |           |            | [phone]         |                   |
|        |           |            | [phone] (Fax)   |                   |
+--------+-----------+------------+----------------------+-------------------+
| 02/10/ | Clinical  | Cardiology |                      |                   |
|    | Support   |            |                      |                   |
+--------+-----------+------------+----------------------+-------------------+
| / | Office    | Cardiology |   Tonia Wilson,    |                   |
|    | Visit     |            | ARNP  [address]   |                   |
|        |           |            | [place]  |                   |
|        |           |            | [address]  [phone]  |                   |
|        |           |            |  [phone] (Fax)  |                   |
+--------+-----------+------------+----------------------+-------------------+
| / | Off-Site  | Nephrology |   Marzena Moser  |                   |
|    | Visit     |            | DO ETIENNE  [address] [place] [address]      |                   |
|        |           |            | [address]      |                   |
|        |           |            | [place]      |                   |
|        |           |            | [address]  [phone]  |                   |
|        |           |            |  [phone] (Fax)  |                   |
+--------+-----------+------------+----------------------+-------------------+
 documented as of this encounter
 
 Visit Diagnoses
 Not on filedocumented in this encounter

## 2020-01-08 NOTE — XMS
Encounter Summary
  Created on: 2020
 
 Viviana Ricci
 External Reference #: [id number]
 : 47
 Sex: Female
 
 Demographics
 
 
+-----------------------+----------------------+
| Address               | [address]      |
|                       | [place]  [address] |
+-----------------------+----------------------+
| Home Phone            | [phone]      |
+-----------------------+----------------------+
| Preferred Language    | Unknown              |
+-----------------------+----------------------+
| Marital Status        | Single               |
+-----------------------+----------------------+
| Amish Affiliation | [id number]                 |
+-----------------------+----------------------+
| Race                  | Unknown              |
+-----------------------+----------------------+
| Ethnic Group          | Unknown              |
+-----------------------+----------------------+
 
 
 Author
 
 
+--------------+--------------------------------------------+
| Author       | [facility]  |
|              | and [place]                                |
+--------------+--------------------------------------------+
| Organization | [facility]  |
|              | and [place]                                |
+--------------+--------------------------------------------+
| Address      | Unknown                                    |
+--------------+--------------------------------------------+
| Phone        | Unavailable                                |
+--------------+--------------------------------------------+
 
 
 
 Support
 
 
+----------------+--------------+---------------------+-----------------+
| Name           | Relationship | Address             | Phone           |
+----------------+--------------+---------------------+-----------------+
| Ada/Ed Radhames | ECON         | [place]              | [phone] |
|                |              | [place]  |                 |
|                |              |  [address]              |                 |
+----------------+--------------+---------------------+-----------------+
 
 
 
 
 Care Team Providers
 
 
+-----------------------+------+-------------+
| Care Team Member Name | Role | Phone       |
+-----------------------+------+-------------+
 PCP  | Unavailable |
+-----------------------+------+-------------+
 
 
 
 Reason for Visit
 
 
+-------------------+----------+
| Reason            | Comments |
+-------------------+----------+
| Medication Refill |          |
+-------------------+----------+
 
 
 
 Encounter Details
 
 
+--------+--------+---------------------+----------------------+-------------------+
| Date   | Type   | Department          | Care Team            | Description       |
+--------+--------+---------------------+----------------------+-------------------+
| / | Refill |   [facility]         |   Marzena Moser  | Medication Refill |
| 2018   |        | NEPHROLOGY  [address]   | M, DO  [address]      |                   |
|        |        | [address]   | [address]      |                   |
|        |        | [place], [place]     | [place], [place]      |                   |
|        |        | [address]          | [address]  [phone]  |                   |
|        |        | [phone]        |  [phone] (Fax)  |                   |
+--------+--------+---------------------+----------------------+-------------------+
 
 
 
 Social History
 
 
+--------------+-------+-----------+--------+------+
| Tobacco Use  | Types | Packs/Day | Years  | Date |
|              |       |           | Used   |      |
+--------------+-------+-----------+--------+------+
| Never Smoker |       |           |        |      |
+--------------+-------+-----------+--------+------+
 
 
 
+---------------------+---+---+---+
| Smokeless Tobacco:  |   |   |   |
| Never Used          |   |   |   |
+---------------------+---+---+---+
 
 
 
+---------------------------------------------------------------+
| Comments: some second hand smoke exposure, but fairly minimal |
 
+---------------------------------------------------------------+
 
 
 
+-------------+-------------+---------+----------+
| Alcohol Use | Drinks/Week | oz/Week | Comments |
+-------------+-------------+---------+----------+
| No          |             |         |          |
+-------------+-------------+---------+----------+
 
 
 
+------------------+---------------+
| Sex Assigned at  | Date Recorded |
| Birth            |               |
+------------------+---------------+
| Not on file      |               |
+------------------+---------------+
 
 
 
+----------------+-------------+-------------+
| Job Start Date | Occupation  | Industry    |
+----------------+-------------+-------------+
| Not on file    | Not on file | Not on file |
+----------------+-------------+-------------+
 
 
 
+----------------+--------------+------------+
| Travel History | Travel Start | Travel End |
+----------------+--------------+------------+
 
 
 
+-------------------------------------+
| No recent travel history available. |
+-------------------------------------+
 documented as of this encounter
 
 Plan of Treatment
 
 
+--------+-----------+------------+----------------------+-------------------+
| Date   | Type      | Specialty  | Care Team            | Description       |
+--------+-----------+------------+----------------------+-------------------+
| / | Office    | Cardiology |   HellalfredoTonia,    |                   |
|    | Visit     |            | ARNP  [address]   |                   |
|        |           |            | St  [place], [place]  |                   |
|        |           |            | [address]  [phone]  |                   |
|        |           |            |  [phone] (Fax)  |                   |
+--------+-----------+------------+----------------------+-------------------+
| / | Hospital  | Radiology  |   Popeye Townsend, |                   |
|    | Encounter |            |  MD  [address] |                   |
|        |           |            |  St.  [place],   |                   |
|        |           |            | [place] [address]             |                   |
|        |           |            | [phone]         |                   |
|        |           |            | [phone] (Fax)   |                   |
+--------+-----------+------------+----------------------+-------------------+
| / | Surgery   | Radiology  |   Popeye Townsend, | CV EP PPM SYSTEM  |
 
|    |           |            |  MD  [address] | IMPLANT           |
|        |           |            |  St.  [place],   |                   |
|        |           |            | [place] [address]             |                   |
|        |           |            | [phone]         |                   |
|        |           |            | [phone] (Fax)   |                   |
+--------+-----------+------------+----------------------+-------------------+
| 02/10/ | Clinical  | Cardiology |                      |                   |
|    | Support   |            |                      |                   |
+--------+-----------+------------+----------------------+-------------------+
| / | Office    | Cardiology |   Tonia Wilson,    |                   |
|    | Visit     |            | ARNP  [address]   |                   |
|        |           |            | [place]  |                   |
|        |           |            | [address]  [phone]  |                   |
|        |           |            |  [phone] (Fax)  |                   |
+--------+-----------+------------+----------------------+-------------------+
| / | Off-Site  | Nephrology |   Marzena Moser  |                   |
|    | Visit     |            | DO ETIENNE  [address] [place] [address]      |                   |
|        |           |            | [address]      |                   |
|        |           |            | [place]      |                   |
|        |           |            | [address]  [phone]  |                   |
|        |           |            |  [phone] (Fax)  |                   |
+--------+-----------+------------+----------------------+-------------------+
 documented as of this encounter
 
 Visit Diagnoses
 
 
+---------------------------------+
| Diagnosis                       |
+---------------------------------+
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
+---------------------------------+
 documented in this encounter

## 2020-01-08 NOTE — XMS
Encounter Summary
  Created on: 2020
 
 Viviana Ricci
 External Reference #: [id number]
 : 47
 Sex: Female
 
 Demographics
 
 
+-----------------------+----------------------+
| Address               | [address]      |
|                       | [place]  [address] |
+-----------------------+----------------------+
| Home Phone            | [phone]      |
+-----------------------+----------------------+
| Preferred Language    | Unknown              |
+-----------------------+----------------------+
| Marital Status        | Single               |
+-----------------------+----------------------+
| Orthodoxy Affiliation | 1009                 |
+-----------------------+----------------------+
| Race                  | Unknown              |
+-----------------------+----------------------+
| Ethnic Group          | Unknown              |
+-----------------------+----------------------+
 
 
 Author
 
 
+--------------+--------------------------------------------+
| Author       | [facility]  |
|              | and [place]                                |
+--------------+--------------------------------------------+
| Organization | [facility]  |
|              | and [place]                                |
+--------------+--------------------------------------------+
| Address      | Unknown                                    |
+--------------+--------------------------------------------+
| Phone        | Unavailable                                |
+--------------+--------------------------------------------+
 
 
 
 Support
 
 
+----------------+--------------+---------------------+-----------------+
| Name           | Relationship | Address             | Phone           |
+----------------+--------------+---------------------+-----------------+
| Ada/Ed Radhames | ECON         | [address]              | [phone] |
|                |              | [place]  |                 |
|                |              |  [id number]              |                 |
+----------------+--------------+---------------------+-----------------+
 
 
 
 
 Care Team Providers
 
 
+------------------------+------+-----------------+
| Care Team Member Name  | Role | Phone           |
+------------------------+------+-----------------+
| Marzena Moser DO | PCP  | [phone] |
+------------------------+------+-----------------+
 
 
 
 Reason for Referral
 Evaluate & Treat (Routine)
 
+--------+--------------+---------------+--------------+--------------+---------------+
| Status | Reason       | Specialty     | Diagnoses /  | Referred By  | Referred To   |
|        |              |               | Procedures   | Contact      | Contact       |
+--------+--------------+---------------+--------------+--------------+---------------+
| Closed |   Specialty  | Cardiac       |   Diagnoses  |   [facility] |   [facility]  |
|        | Services     | Rehabilitatio |  CHF         |  MD [facility]  | HOSPITAL      |
|        | Required     | n             | (congestive  |  [address]    | PHYSICAL      |
|        |              |               | heart        | [address]   | THERAPY  [address] |
|        |              |               | failure),    | [place], |  [place]    |
|        |              |               | NYHA class   |  [place] [address]    | [place] |
|        |              |               | I, chronic,  | Phone:       |  [phone]   |
|        |              |               | diastolic    | [phone] | Phone:        |
|        |              |               | (HCC)        |   Fax:       | [phone]  |
|        |              |               | Procedures   | [phone] |  Fax:         |
|        |              |               | [place]           |              | [phone]  |
|        |              |               | OUTPATIENT   |              |               |
|        |              |               | CARDIAC      |              |               |
|        |              |               | REHAB W/O    |              |               |
|        |              |               | CONT ECG     |              |               |
|        |              |               | MONITOR      |              |               |
|        |              |               |  >      |              |               |
|        |              |               | PENDING      |              |               |
|        |              |               | STATUS  ST[facility]  |              |               |
|        |              |               | [facility]      |              |               |
+--------+--------------+---------------+--------------+--------------+---------------+
 
 
 
 
 Encounter Details
 
 
+--------+-------------+----------------------+----------------------+----------------------
+
| Date   | Type        | Department           | Care Team            | Description          
|
+--------+-------------+----------------------+----------------------+----------------------
+
| 10/10/ | Orders Only |   [facility]          |   P[facility] Townsend, | CHF (congestive      
|
| 2019   |             | CARDIOLOGY  [address]    |  MD  [address] | heart failure), NYHA 
|
|        |             | [address]  [place], |  [address].  [place],   |  class I, chronic,   
|
|        |             |  [place] [address]       | [place] [address]             | diastolic (HCC)      
 
|
|        |             | [phone]         | [phone]         | (Primary Dx)         
|
|        |             |                      | [phone] (Fax)   |                      
|
+--------+-------------+----------------------+----------------------+----------------------
+
 
 
 
 Social History
 
 
+--------------+-------+-----------+--------+------+
| Tobacco Use  | Types | Packs/Day | Years  | Date |
|              |       |           | Used   |      |
+--------------+-------+-----------+--------+------+
| Never Smoker |       |           |        |      |
+--------------+-------+-----------+--------+------+
 
 
 
+---------------------+---+---+---+
| Smokeless Tobacco:  |   |   |   |
| Never Used          |   |   |   |
+---------------------+---+---+---+
 
 
 
+---------------------------------------------------------------+
| Comments: some second hand smoke exposure, but fairly minimal |
+---------------------------------------------------------------+
 
 
 
+-------------+-------------+---------+----------+
| Alcohol Use | Drinks/Week | oz/Week | Comments |
+-------------+-------------+---------+----------+
| No          |             |         |          |
+-------------+-------------+---------+----------+
 
 
 
+------------------+---------------+
| Sex Assigned at  | Date Recorded |
| Birth            |               |
+------------------+---------------+
| Not on file      |               |
+------------------+---------------+
 
 
 
+----------------+-------------+-------------+
| Job Start Date | Occupation  | Industry    |
+----------------+-------------+-------------+
| Not on file    | Not on file | Not on file |
+----------------+-------------+-------------+
 
 
 
 
+----------------+--------------+------------+
| Travel History | Travel Start | Travel End |
+----------------+--------------+------------+
 
 
 
+-------------------------------------+
| No recent travel history available. |
+-------------------------------------+
 documented as of this encounter
 
 Plan of Treatment
 
 
+--------+-----------+------------+----------------------+-------------------+
| Date   | Type      | Specialty  | Care Team            | Description       |
+--------+-----------+------------+----------------------+-------------------+
| / | Office    | Cardiology |   Tonia Wilson,    |                   |
|    | Visit     |            | ARNP  [address]   |                   |
|        |           |            | St [place], [place]  |                   |
|        |           |            | [address]  [phone]  |                   |
|        |           |            |  [phone] (Fax)  |                   |
+--------+-----------+------------+----------------------+-------------------+
| / | Hospital  | Radiology  |   Popeye Townsend, |                   |
|    | Encounter |            |  MD  [address] |                   |
|        |           |            |  [place],   |                   |
|        |           |            | [place] [address]             |                   |
|        |           |            | [phone]         |                   |
|        |           |            | [phone] (Fax)   |                   |
+--------+-----------+------------+----------------------+-------------------+
| / | Surgery   | Radiology  |   P[facility]ye Townsend, | CV EP PPM SYSTEM  |
|    |           |            |  MD  [address] | IMPLANT           |
|        |           |            |  [place],   |                   |
|        |           |            | [place] [address]             |                   |
|        |           |            | [phone]         |                   |
|        |           |            | [phone] (Fax)   |                   |
+--------+-----------+------------+----------------------+-------------------+
| 02/10/ | Clinical  | Cardiology |                      |                   |
|    | Support   |            |                      |                   |
+--------+-----------+------------+----------------------+-------------------+
| / | Office    | Cardiology |   Tonia Wilson,    |                   |
|    | Visit     |            | [facility]  [address]   |                   |
|        |           |            | [place]  |                   |
|        |           |            | [address]  [phone]  |                   |
|        |           |            |  [phone] (Fax)  |                   |
+--------+-----------+------------+----------------------+-------------------+
| / | Off-Site  | Nephrology |   Marzena Moser  |                   |
|    | Visit     |            | DO [place]  [address] [place] [address]      |                   |
|        |           |            | [address]      |                   |
|        |           |            | [place]      |                   |
|        |           |            | [phone]  |                   |
|        |           |            |  [phone] (Fax)  |                   |
+--------+-----------+------------+----------------------+-------------------+
 
 
 
+----------------------+-------------+--------+----------------------+----------------------
+
| Name                 | Type        | Priori | Associated Diagnoses | Order Schedule       
|
 
|                      |             | ty     |                      |                      
|
+----------------------+-------------+--------+----------------------+----------------------
+
| Referral to Cardiac  | Outpatient  | Routin |   CHF (congestive    | 1 Occurrences        
|
| Rehab                | Referral    | e      | heart failure), NYHA | starting 10/10/2019  
|
|                      |             |        |  class I, chronic,   | until 10/09/2020     
|
|                      |             |        | diastolic (HCC)      |                      
|
+----------------------+-------------+--------+----------------------+----------------------
+
 documented as of this encounter
 
 Visit Diagnoses
 
 
+------------------------------------------------------------------------------------+
| Diagnosis                                                                          |
+------------------------------------------------------------------------------------+
|   CHF (congestive heart failure), NYHA class I, chronic, diastolic (HCC) - Primary |
+------------------------------------------------------------------------------------+
 documented in this encounter

## 2020-01-08 NOTE — XMS
Encounter Summary
  Created on: 2020
 
 Viviana Ricci
 External Reference #: [id number]
 : 47
 Sex: Female
 
 Demographics
 
 
+-----------------------+----------------------+
| Address               | [address]      |
|                       | [place]  [address] |
+-----------------------+----------------------+
| Home Phone            | [phone]      |
+-----------------------+----------------------+
| Preferred Language    | Unknown              |
+-----------------------+----------------------+
| Marital Status        | Single               |
+-----------------------+----------------------+
| Muslim Affiliation | 1009                 |
+-----------------------+----------------------+
| Race                  | Unknown              |
+-----------------------+----------------------+
| Ethnic Group          | Unknown              |
+-----------------------+----------------------+
 
 
 Author
 
 
+--------------+--------------------------------------------+
| Author       | [facility]  |
|              | and [place]                                |
+--------------+--------------------------------------------+
| Organization | [facility]  |
|              | and [place]                                |
+--------------+--------------------------------------------+
| Address      | Unknown                                    |
+--------------+--------------------------------------------+
| Phone        | Unavailable                                |
+--------------+--------------------------------------------+
 
 
 
 Support
 
 
+----------------+--------------+---------------------+-----------------+
| Name           | Relationship | Address             | Phone           |
+----------------+--------------+---------------------+-----------------+
| Ada/Ed Radhames | ECON         | [place]              | [phone] |
|                |              | [place], [place]  |                 |
|                |              |  [address]              |                 |
+----------------+--------------+---------------------+-----------------+
 
 
 
 
 Care Team Providers
 
 
+-----------------------+------+-------------+
| Care Team Member Name | Role | Phone       |
+-----------------------+------+-------------+
 PCP  | Unavailable |
+-----------------------+------+-------------+
 
 
 
 Encounter Details
 
 
+--------+----------+----------------------+----------------+---------------------+
| Date   | Type     | Department           | Care Team      | Description         |
+--------+----------+----------------------+----------------+---------------------+
| / | Abstract |   [facility] [place]          |   [place],  | Asthma (Primary Dx) |
|    |          | PULMONARY  [address]     | Nena GUSMAN MD  |                     |
|        |          | [place], |                |                     |
|        |          |  [place] [address]       |                |                     |
|        |          | [phone]         |                |                     |
+--------+----------+----------------------+----------------+---------------------+
 
 
 
 Social History
 
 
+--------------+-------+-----------+--------+------+
| Tobacco Use  | Types | Packs/Day | Years  | Date |
|              |       |           | Used   |      |
+--------------+-------+-----------+--------+------+
| Never Smoker |       |           |        |      |
+--------------+-------+-----------+--------+------+
 
 
 
+---------------------+---+---+---+
| Smokeless Tobacco:  |   |   |   |
| Never Used          |   |   |   |
+---------------------+---+---+---+
 
 
 
+-------------+-------------+---------+----------+
| Alcohol Use | Drinks/Week | oz/Week | Comments |
+-------------+-------------+---------+----------+
| No          |             |         |          |
+-------------+-------------+---------+----------+
 
 
 
+------------------+---------------+
| Sex Assigned at  | Date Recorded |
| Birth            |               |
+------------------+---------------+
| Not on file      |               |
+------------------+---------------+
 
 
 
 
+----------------+-------------+-------------+
| Job Start Date | Occupation  | Industry    |
+----------------+-------------+-------------+
| Not on file    | Not on file | Not on file |
+----------------+-------------+-------------+
 
 
 
+----------------+--------------+------------+
| Travel History | Travel Start | Travel End |
+----------------+--------------+------------+
 
 
 
+-------------------------------------+
| No recent travel history available. |
+-------------------------------------+
 documented as of this encounter
 
 Plan of Treatment
 
 
+--------+-----------+------------+----------------------+-------------------+
| Date   | Type      | Specialty  | Care Team            | Description       |
+--------+-----------+------------+----------------------+-------------------+
| / | Office    | Cardiology |   Tonia Wilson,    |                   |
|    | Visit     |            | ARNP  [address]   |                   |
|        |           |            | [place]  |                   |
|        |           |            | [address] [phone]  |                   |
|        |           |            |  [phone] (Fax)  |                   |
+--------+-----------+------------+----------------------+-------------------+
| / | Hospital  | Radiology  |   Popeye Townsend, |                   |
|    | Encounter |            |  MD  [address] |                   |
|        |           |            |  [place]. [place]   |                   |
|        |           |            | [place] [address]             |                   |
|        |           |            | [phone]         |                   |
|        |           |            | [phone] (Fax)   |                   |
+--------+-----------+------------+----------------------+-------------------+
| / | Surgery   | Radiology  |   Popeye Townsend, | CV EP PPM SYSTEM  |
|    |           |            |  MD  [address] | IMPLANT           |
|        |           |            |  St.  [place],   |                   |
|        |           |            | [place] [address]             |                   |
|        |           |            | [phone]         |                   |
|        |           |            | [phone] (Fax)   |                   |
+--------+-----------+------------+----------------------+-------------------+
| 02/10/ | Clinical  | Cardiology |                      |                   |
|    | Support   |            |                      |                   |
+--------+-----------+------------+----------------------+-------------------+
| / | Office    | Cardiology |   Tonia Wilson,    |                   |
|    | Visit     |            | ARNJESSICA  [address]   |                   |
|        |           |            | St  [place]  |                   |
|        |           |            | [address]  [phone]  |                   |
|        |           |            |  [phone] (Fax)  |                   |
+--------+-----------+------------+----------------------+-------------------+
| / | Off-Site  | Nephrology |   Marzena Moser  |                   |
|    | Visit     |            | M,   [address]      |                   |
|        |           |            | [address]      |                   |
 
|        |           |            | [place]      |                   |
|        |           |            | [address]  [phone]  |                   |
|        |           |            |  [phone] (Fax)  |                   |
+--------+-----------+------------+----------------------+-------------------+
 documented as of this encounter
 
 Visit Diagnoses
 
 
+----------------------------------------+
| Diagnosis                              |
+----------------------------------------+
|   Asthma - Primary  Unspecified asthma |
+----------------------------------------+
 documented in this encounter

## 2020-01-08 NOTE — XMS
Encounter Summary
  Created on: 2020
 
 Viviana Ricci
 External Reference #: [id number]
 : 47
 Sex: Female
 
 Demographics
 
 
+-----------------------+----------------------+
| Address               | [address]      |
|                       | [place]  [address] |
+-----------------------+----------------------+
| Home Phone            | [phone]      |
+-----------------------+----------------------+
| Preferred Language    | Unknown              |
+-----------------------+----------------------+
| Marital Status        | Single               |
+-----------------------+----------------------+
| Jewish Affiliation | [id number]                 |
+-----------------------+----------------------+
| Race                  | Unknown              |
+-----------------------+----------------------+
| Ethnic Group          | Unknown              |
+-----------------------+----------------------+
 
 
 Author
 
 
+--------------+--------------------------------------------+
| Author       | [facility]  |
|              | and [facility]                                |
+--------------+--------------------------------------------+
| Organization | [facility]  |
|              | and [place]                                |
+--------------+--------------------------------------------+
| Address      | Unknown                                    |
+--------------+--------------------------------------------+
| Phone        | Unavailable                                |
+--------------+--------------------------------------------+
 
 
 
 Support
 
 
+----------------+--------------+---------------------+-----------------+
| Name           | Relationship | Address             | Phone           |
+----------------+--------------+---------------------+-----------------+
| Ada/Ed Radhames | ECON         | [address]              | [phone] |
|                |              | [place], [place]  |                 |
|                |              |  [address]              |                 |
+----------------+--------------+---------------------+-----------------+
 
 
 
 
 Care Team Providers
 
 
+-----------------------+------+-------------+
| Care Team Member Name | Role | Phone       |
+-----------------------+------+-------------+
 PCP  | Unavailable |
+-----------------------+------+-------------+
 
 
 
 Reason for Referral
 Diagnostic/Screening (Routine)
 
+--------+--------+-----------+--------------+--------------+---------------+
| Status | Reason | Specialty | Diagnoses /  | Referred By  | Referred To   |
|        |        |           | Procedures   | Contact      | Contact       |
+--------+--------+-----------+--------------+--------------+---------------+
| Closed |        | Radiology |   Diagnoses  |              |   [facility]    |
|        |        |           |  Pulmonary   | Offenstein,  | [address]  |
|        |        |           | hypertension | Nena GUSMAN,   |  [place], |
|        |        |           |  ([facility])       | MD  [address]    |  [place]           |
|        |        |           | Procedures   | [address]    | [address]    |
|        |        |           | ECHO         | [place], | Phone:        |
|        |        |           | Complete     |  [place] [address]    | [phone]  |
|        |        |           |              |              |  Fax:         |
|        |        |           |              |              | [phone]  |
+--------+--------+-----------+--------------+--------------+---------------+
 
 
 
 
 Reason for Visit
 
 
+----------------+----------+
| Reason         | Comments |
+----------------+----------+
| Establish Care |          |
+----------------+----------+
 
 
 
 Encounter Details
 
 
+--------+---------+----------------------+----------------+----------------------+
| Date   | Type    | Department           | Care Team      | Description          |
+--------+---------+----------------------+----------------+----------------------+
| / | Office  |   [facility]          |   [place],  | Cough (Primary Dx);  |
|    | Visit   | PULMONARY  [address]     | Nena GUSMAN MD  | Dyspnea; JACK on      |
|        |         | [address]  [place], |                | CPAP; Obesity        |
|        |         |  [place] [address]       |                | hypoventilation      |
|        |         | [phone]         |                | syndrome (HCC);      |
|        |         |                      |                | Pulmonary            |
|        |         |                      |                | hypertension (HCC)   |
+--------+---------+----------------------+----------------+----------------------+
 
 
 
 
 Social History
 
 
+--------------+-------+-----------+--------+------+
| Tobacco Use  | Types | Packs/Day | Years  | Date |
|              |       |           | Used   |      |
+--------------+-------+-----------+--------+------+
| Never Smoker |       |           |        |      |
+--------------+-------+-----------+--------+------+
 
 
 
+---------------------+---+---+---+
| Smokeless Tobacco:  |   |   |   |
| Never Used          |   |   |   |
+---------------------+---+---+---+
 
 
 
+---------------------------------------------------------------+
| Comments: some second hand smoke exposure, but fairly minimal |
+---------------------------------------------------------------+
 
 
 
+-------------+-------------+---------+----------+
| Alcohol Use | Drinks/Week | oz/Week | Comments |
+-------------+-------------+---------+----------+
| No          |             |         |          |
+-------------+-------------+---------+----------+
 
 
 
+------------------+---------------+
| Sex Assigned at  | Date Recorded |
| Birth            |               |
+------------------+---------------+
| Not on file      |               |
+------------------+---------------+
 
 
 
+----------------+-------------+-------------+
| Job Start Date | Occupation  | Industry    |
+----------------+-------------+-------------+
| Not on file    | Not on file | Not on file |
+----------------+-------------+-------------+
 
 
 
+----------------+--------------+------------+
| Travel History | Travel Start | Travel End |
+----------------+--------------+------------+
 
 
 
+-------------------------------------+
| No recent travel history available. |
+-------------------------------------+
 
 documented as of this encounter
 
 Last Filed Vital Signs
 
 
+-------------------+-------------------+----------------------+----------+
| Vital Sign        | Reading           | Time Taken           | Comments |
+-------------------+-------------------+----------------------+----------+
| Blood Pressure    | 100/66            | 2013  2:03 PM  |          |
|                   |                   | PDT                  |          |
+-------------------+-------------------+----------------------+----------+
| Pulse             | 70                | 2013  2:03 PM  |          |
|                   |                   | PDT                  |          |
+-------------------+-------------------+----------------------+----------+
| Temperature       | -                 | -                    |          |
+-------------------+-------------------+----------------------+----------+
| Respiratory Rate  | -                 | -                    |          |
+-------------------+-------------------+----------------------+----------+
| Oxygen Saturation | 95%               | 2013  2:03 PM  |          |
|                   |                   | PDT                  |          |
+-------------------+-------------------+----------------------+----------+
| Inhaled Oxygen    | -                 | -                    |          |
| Concentration     |                   |                      |          |
+-------------------+-------------------+----------------------+----------+
| Weight            | 135.6 kg (299 lb) | 2013  2:03 PM  |          |
|                   |                   | PDT                  |          |
+-------------------+-------------------+----------------------+----------+
| Height            | 167.6 cm (5' 6")  | 2013  2:03 PM  |          |
|                   |                   | PDT                  |          |
+-------------------+-------------------+----------------------+----------+
| Body Mass Index   | 48.26             | 2013  2:03 PM  |          |
|                   |                   | PDT                  |          |
+-------------------+-------------------+----------------------+----------+
 documented in this encounter
 
 Patient Instructions
 Patient Instructions Nena Boykin MD - 2013  3:43 PM PDTTake CPAP machine o
r chip to [facility] to get a download for me.
  Have echocardiogram done here and overnight oxygen test done through [facility]. Elec
tronically signed by Nena Boykin MD at 2013  3:45 PM PDT
 documented in this encounter
 
 Progress Notes
 Nena Boykin MD - 2013  2:45 PM PDTFormatting of this note might be differe
nt from the original.
 Pulmonary Consult Note
 
 Referring Provider: Referral, Self 
 
 HPI 
  
 Viviana Ricci is a 67 y.o. female patient of Marzena Moser here today for evaluation of
 pulmonary hypertension, shortness of breath, with question of asthma.
 
 She notes that two years ago she developed a cough about three years ago she developed a co
ugh. She saw Dr. Pham and was diagnosed with pulmonary hypertension. She has undergone an ex
tensive work up for that. It was felt that the main etiology was sleep apnea, but there was 
question about her heart catheterization results and if Dr. Cabello was going to do any treatme
nt. 
 
 
 She notes that in August she again developed a cough. She initially went to urgent care in 
[place]. She was first diagnosed with bronchitis and she was treated with antibiotics, and
 they seemed to help. The second time they gave her three different antibiotics without reli
ef. The third time, they recommended she get further evaluation. 
 
 She then had some issues with wheezing. She notes that she would wake up at night wheezing.
 She finally went in to urgent care here in February at [facility], and at that visit she saw Dr. Mireles and he thought she might have asthma. She was prescribed ProAir and an antibiotic a
nd she did not feel that these helped. Over time though, her symptoms did get somewhat fox
r in that the wheezing resolved, and the cough has abated to an intermittent hack. 
 
 She notes however, that her shortness of breath has progressed to the point where she has d
ifficulty walking across the room. She has associated weakness and fatigue, and at times fee
ls like she will have difficulty with standing like she will fall in to the floor.  
 
 She does note that when the cough started again she went back off her CPAP machine, and she
 wonders if this is why she had gotten more tired. She is now back on her CPAP, having resta
rted it 3 weeks ago. She last saw Yaakov in 2011 and at that time had been averagi
ng 4:30 a night usage with an AHI of 1.3 on an auto titrating CPAP machine. She gets her mac
rosalee through [facility]. She notes that she does not otherwise have issues with the mac
rosalee, and the mask is not leaking. She does not know that her fatigue is getting better now 
that she is back on her CPAP, though she might feel better in the morning. She does not have
 an oxygen bleed in on her CPAP. 
 
 Past Medical History
 Past Medical History 
 Diagnosis Date 
   Kidney replaced by transplant  
   Complication of transplanted kidney  
   Coronary artery disease  
   s/p CABG , cardiac cath in  
   Pulmonary hypertension  
   thought secondary to nocturnal hypoxemia 
   Obesity hypoventilation syndrome  
   JACK on CPAP  
   Diabetes mellitus, type 2  
   Secondary hyperparathyroidism  
   Hypothyroidism  
   Hyperlipidemia  
   Chronic low back pain  
   Movement disorder  
   tremor of unclear etiology 
   DJD (degenerative joint disease)  
   s/p knee replacement 
   Humerus fracture  
   right supracondylar distal humerus fracture 
   Carpal tunnel syndrome  
   Anemia in chronic renal disease  
   resolved after transplant 
   Infection with CMV (cytomegalovirus)  
   complicating kidney transplant 
   FSGS (focal segmental glomerulosclerosis)  
   ESRD (end stage renal disease)  
  
  
 Past Surgical History
 Past Surgical History 
 Procedure Date 
   Cholecystectomy  
   Insert peritoneal catheter 1998 
 
   x 2 
   Kidney transplant 2000 
   Total knee arthroplasty 2004 
   Right 
   Hysterectomy 2003 
   Abdominal exploration surgery 2006 
   Parathyroidectomy 2007 
   Carpal tunnel release 2005 
   Coronary artery bypass graft 1998 
   5v 
  
 
 Family History: 
 Family History 
 Problem Relation Age of Onset 
   Parkinsonism Father  
   Heart disease Father  
   Ovarian cancer Mother  
   Other (See Comment) Brother  
   paralyzed in accident and then  from complications 
  
 
 Social History: 
 History 
 
 Social History 
   Marital Status: Single 
   Spouse Name: N/A 
   Number of Children: N/A 
   Years of Education: N/A 
 
 Occupational History 
   .  Disabled 
 
 Social History Main Topics 
   Smoking status: Never Smoker  
   Smokeless tobacco: Never Used 
   Alcohol Use: No 
   Drug Use: No 
   Sexually Active: None 
 
 Other Topics Concern 
   None 
 
 Social History Narrative 
  Lives in [place] alone. Has a dog at home. No other animal exposures. Had birds as a chi
ld. Grew up in [place], [place]. 
 
 Allergies:
 No Known Allergies  
 
 Medications:
 Outpatient Encounter Prescriptions as of 2013 
 Medication Sig Dispense Refill 
   valsartan (DIOVAN) 160 mg tablet Take 1 tablet by mouth Daily.  30 tablet  11 
   albuterol (PROAIR HFA) 90 mcg/puff inhaler Inhale 2 puffs into the lungs every 6 hours 
as needed for Wheezing.  1 Inhaler  2 
   lisinopril (PRINIVIL,ZESTRIL) 30 MG tablet Take 1 tablet by mouth Daily.  90 tablet  3 
   predniSONE (DELTASONE) 5 mg tablet Take 1 tablet by mouth Daily.  90 tablet  3 
   glucose blood VI test strips (ONE TOUCH ULTRA TEST) strip Check blood sugar before each
 
 meal and as directed  100 each  12 
   furosemide (LASIX) 40 mg tablet Take two tablets by mouth daily.  60 tablet  5 
   rosuvastatin (CRESTOR) 40 MG tablet Take 40 mg by mouth nightly.       
   tacrolimus (PROGRAF) 1 mg capsule Take 1 mg by mouth 2 times daily.     
   tacrolimus (PROGRAF) 0.5 mg capsule Take 0.5 mg by mouth 2 times daily.     
   insulin glargine (LANTUS) 100 units/mL injection Inject 20 Units under the skin every m
orning.       
   cinacalcet (SENSIPAR) 30 mg tablet Take 1 tablet by mouth Daily.  30 tablet  12 
   fludrocortisone (FLORINEF) 0.1 mg tablet Take 1 tablet by mouth Every other day.  30 ta
blet  11 
   levothyroxine (LEVOTHROID) 50 mcg tablet Take 1 tablet by mouth Daily.  30 tablet  11 
   metoprolol tartrate (LOPRESSOR) 25 mg tablet Take 25 mg by mouth 2 times daily.     
   Prenatal Multivit-Min-Fe-FA (PRENATAL VITAMINS) 0.8 MG TABS Take 0.8 mg by mouth Daily.
     
   omeprazole (PRILOSEC) 20 mg capsule Take one capsule by mouth once daily on an empty st
omach     
   allopurinol (ZYLOPRIM) 100 mg tablet Take 100 mg by mouth Daily.     
   aspirin 81 MG EC tablet Take 81 mg by mouth Daily.     
   loperamide (ANTI-DIARRHEAL) 2 mg capsule Take 2 mg by mouth Daily as needed.     
   mycophenolate (CELLCEPT) 250 mg capsule Take 250 mg by mouth 3 times daily.     
   Insulin Syringe-Needle U-100 (BD INSULIN SYRINGE ULTRAFINE) 31G X 5/16" 0.5 ML MISC Use
 to inject insulin subcutaneously before meals or as directed     
   insulin lispro (HUMALOG) 100 units/mL injection Inject subcutaneously before meals acco
rding to sliding scale     
   Cholecalciferol (VITAMIN D3) 5000 UNITS CAPS Take 5,000 Units by mouth Once a week.    
 
   magnesium oxide (MAG-OX) 400 mg tablet Take 400 mg by mouth 2 times daily.     
   niacin (NIASPAN) 500 mg CR tablet Not taking     
 
 Review of Systems
 Constitutional:  Denies fever, chills, sweats. Has gained and lost weight. Up 16 pounds in 
the last year.  
 Sleep:  No trouble sleeping on her CPAP. Typically has felt rested using her CPAP.  
 Eyes:  Blurred vision at times, blood sugar dependent.  
 ENT:  Notes that she clears her throat a lot, but does not notice drainage or nasal congest
ion. Had a little during the time period of her bronchitis.  
 Resp:   See HPI.  
 CV:  Denies chest pain, palpitations, syncope. Chronic leg swelling, no change.  
 GI:  Denies  heartburn, nausea, vomiting, and abdominal pain. 
 : Denies difficulty emptying bladder and nocturia. 
 Musculoskeletal: Chronic low back pain, no other joint issues.  
 Derm: Denies rash, itching, dryness, and suspicious lesions. 
 Neurologic: Foot numbness.  
 Psych: Denies depression, anxiety, and suicidal ideation. 
 Endo: Denies cold intolerance, heat intolerance, and unusual weight change. 
 Heme: Denies bleeding, and enlarged lymph nodes. Easy bruising.  
 Allergy: Denies food allergies, allergic rash, and  hay fever. 
 
 Objective 
 
 /66 | Pulse 70 | Ht 1.676 m (5' 6") | Wt 135.626 kg (299 lb) | BMI 48.26 kg/m2 | SpO2
 95%
 
 General Appearance:  Alert, cooperative, no distress, appears stated age, obese, comfortabl
e, has a very difficult time moving, periodically shakes her head 
 Head:  Normocephalic, without obvious abnormality, atraumatic 
 Eyes:  PERRL, conjunctiva clear, no scleral icterus, EOM's intact 
 Ears:  Normal TM's, external auditory canals, normal acuity 
 Nose: Nares normal, septum midline, mucosa normal 
 Mouth: No oral lesions or exudate 
 
 Neck: Supple, symmetrical, no adenopathy 
 Lungs:   No accessory muscle use, breath sounds are somewhat diminsihed bilaterally, no whe
ezes, crackles or rhonchi 
 Chest Wall:  No deformity 
 Heart:  Regular rate and rhythm, no murmur, rub or gallop though somewhat difficult to ausc
ultate 
 Abdomen:   Soft, non-tender, non-distended, obese 
 Extremities:  No cyanosis, clubbing, 1+ edema 
 Pulses: Radial pulses 1+ and symmetric 
 Skin: Warm and dry 
 Lymph nodes: Cervical and supraclavicular nodes normal 
 
 Data:
 Chest x-ray done 2013 was reviewed and interpreted in clinic today. It shows s
table cardiomegaly.
 
 Chest CT scan done in 2010 was reviewed and interpreted in clinic today. It show
s some focal scarring on the right.
 
 Pulmonary function tests were performed prior to clinic today. They were consistent with no
rmal spirometry, normal lung volumes and a moderately reduced diffusion capacity (not correc
jeanie for a measured hemoglobin, though she notes that this has been fairly normal recently). 
Of note, these look much better than her previous PFTs. 
 
 Marzena Moser's notes were reviewed in clinic today.
 
 Dr. Pham's old notes were reviewed in clinic today.
 
 Sleep study was done back in 2010 and reviewed in clinic today showing an AHI of 6
7.9 and significant desaturations down to as low as 59%. 
 
 Right and left heart cath was done by Dr. Cabello in May of 2010 and reviewed in clinic today.
 It showed basically patent grafts, and a mildly dilated LV with an EF of 70%. Wedge pressur
e was elevated at 18mmHg, PA pressure was 58/22 with a mean of 36mmHg. 
 
 Echocardiogram report and images were not received and was not done here. 
 
 Assessment / Plan 
 Ms. Ricci was seen today for evaluation of dyspnea and cough.
 
 Diagnoses and associated orders for this visit:
 
 Cough and Dyspnea
 On review of all of her records from a variety of sources (and I admit, they are extensive 
and from several facilities at this point encompassing several providers), there is no clear
 etiology found. Her cough was previously attributed to her pulmonary hypertension, but this
 usually does not in my experience cause cough. ACE-I induced cough is a real possibility, a
s it can come on any time when taking the medication. Heart failure or volume over load is a
lso a real possibility as she has had an elevated wedge pressure, kidney failure and more re
cent issues with azotemia post transplant. I suggested we do a few things, starting with the
 diagnoses we know we have: we get her old echocardiogram, done at [facility]? We check a CPAP netta
nload, we check an overnight oximetry, we get another echocardiogram to see where we are at.
 We treat any issues that become readily apparent. We consider checking a BNP. If we don't f
ind anything, then we consider changing her ACE-I to another medication. We can also conside
r a methacholine for asthma if we still have not found an answer, but thi sis lower on my di
fferential. 
 
 Jack on cpap
 She is on her CPAP without issue. She has not seen Yaakov in quite some time. She has lost we
ight since starting CPAP. I would like to check a download to make sure this is going okay. 
 
Poorly treated JACK could worsen heart failure. 
 - Pulse oximetry, overnight study; CPAP
 
 Obesity hypoventilation syndrome
 As above. She has significant desaturations in addition to JACK. That being said, she has lo
st weight. She never had follow up oximetry testing when she started CPAP that she recalls a
nd this should be done to ensure she is not desaturating. 
 - Pulse oximetry, overnight study; CPAP
 
 Pulmonary hypertension
 I am not sure where this stands. Her old echo was not provided though I have right heart ca
th data. We will try to track down her old echo and then also get a new echo. 
 - ECHO Complete; Future
 
 She was advised to call if new pulmonary symptoms were to develop.
 
 Return to clinic after echo and overnight oximetry are done to reassess, or sooner with ellen casiano.
 
 CC: Marzena Moser,DO
 
 Portions of this report were transcribed using voice recognition software.  Every effort wa
s made to ensure accuracy; however, inadvertent computerized transcription errors may be pre
sent.
 
 Electronically signed by Nena Boykni MD at 2013 11:39 PM PDTdocumented in t
his encounter
 
 Plan of Treatment
 
 
+--------+-----------+------------+----------------------+-------------------+
| Date   | Type      | Specialty  | Care Team            | Description       |
+--------+-----------+------------+----------------------+-------------------+
| / | Office    | Cardiology |   Tonia Wilson,    |                   |
|    | Visit     |            | ARNP  [address]   |                   |
|        |           |            | [place], [place]  |                   |
|        |           |            | [address]  [phone]  |                   |
|        |           |            |  [phone] (Fax)  |                   |
+--------+-----------+------------+----------------------+-------------------+
| / | Hospital  | Radiology  |   Popeye Townsend, |                   |
|    | Encounter |            |  MD  [address] |                   |
|        |           |            |  [facility]   |                   |
|        |           |            | [place] [address]             |                   |
|        |           |            | [phone]         |                   |
|        |           |            | [phone] (Fax)   |                   |
+--------+-----------+------------+----------------------+-------------------+
| / | Surgery   | Radiology  |   Irineoroycechidi Yinirineo, | CV EP PPM SYSTEM  |
|    |           |            |  MD  [address] | IMPLANT           |
|        |           |            |  [place]   |                   |
|        |           |            | [place] [address]             |                   |
|        |           |            | [phone]         |                   |
|        |           |            | [phone] (Fax)   |                   |
+--------+-----------+------------+----------------------+-------------------+
| 02/10/ | Clinical  | Cardiology |                      |                   |
2020   | Support   |            |                      |                   |
+--------+-----------+------------+----------------------+-------------------+
| / | Office    | Cardiology |   Tonia Wilson,    |                   |
|    | Visit     |            | ARNJESSICA  [address]   |                   |
|        |           |            | [place]  |                   |
 
|        |           |            | [address]  [phone]  |                   |
|        |           |            |  [phone] (Fax)  |                   |
+--------+-----------+------------+----------------------+-------------------+
| / | Off-Site  | Nephrology |   Marzena Moser  |                   |
| 2020   | Visit     |            | DO ETIENNE  [address] [place] [address]      |                   |
|        |           |            | [address]      |                   |
|        |           |            | [place]      |                   |
|        |           |            | [address]  [phone]  |                   |
|        |           |            |  [phone] (Fax)  |                   |
+--------+-----------+------------+----------------------+-------------------+
 
 
 
+------------------+-------------+--------+----------------------+----------------------+
| Name             | Type        | Priori | Associated Diagnoses | Order Schedule       |
|                  |             | ty     |                      |                      |
+------------------+-------------+--------+----------------------+----------------------+
| ECHO Complete    | Echocardiog | Routin |   Pulmonary          | Expected:            |
|                  | trevon       | e      | hypertension (HCC)   | 2013, Expires: |
|                  |             |        |                      |  2014          |
+------------------+-------------+--------+----------------------+----------------------+
| Pulse oximetry,  | Respiratory | Routin |   JACK on CPAP        | Expected:            |
| overnight study  |  Care       | e      | Obesity              | 2013, Expires: |
|                  |             |        | hypoventilation      |  2014          |
|                  |             |        | syndrome (HCC)       |                      |
+------------------+-------------+--------+----------------------+----------------------+
 documented as of this encounter
 
 Visit Diagnoses
 
 
+----------------------------------------------------------------------------+
| Diagnosis                                                                  |
+----------------------------------------------------------------------------+
|   Cough - Primary                                                          |
+----------------------------------------------------------------------------+
|   Dyspnea  Other dyspnea and respiratory abnormality                       |
+----------------------------------------------------------------------------+
|   JACK on CPAP  Obstructive sleep apnea (adult) (pediatric)                 |
+----------------------------------------------------------------------------+
|   Obesity hypoventilation syndrome (HCC)  Obesity hypoventilation syndrome |
+----------------------------------------------------------------------------+
|   Pulmonary hypertension (HCC)  Other chronic pulmonary heart diseases     |
+----------------------------------------------------------------------------+
 documented in this encounter

## 2020-01-08 NOTE — XMS
Encounter Summary
  Created on: 2020
 
 Viviana Ricci
 External Reference #: [id number]
 : 47
 Sex: Female
 
 Demographics
 
 
+-----------------------+----------------------+
| Address               | [address]      |
|                       | [place]  [address] |
+-----------------------+----------------------+
| Home Phone            | [phone]      |
+-----------------------+----------------------+
| Preferred Language    | Unknown              |
+-----------------------+----------------------+
| Marital Status        | Single               |
+-----------------------+----------------------+
| Druze Affiliation | 1009                 |
+-----------------------+----------------------+
| Race                  | Unknown              |
+-----------------------+----------------------+
| Ethnic Group          | Unknown              |
+-----------------------+----------------------+
 
 
 Author
 
 
+--------------+--------------------------------------------+
| Author       | [facility]  |
|              | and [place]                                |
+--------------+--------------------------------------------+
| Organization | [facility]  |
|              | and [place]                                |
+--------------+--------------------------------------------+
| Address      | Unknown                                    |
+--------------+--------------------------------------------+
| Phone        | Unavailable                                |
+--------------+--------------------------------------------+
 
 
 
 Support
 
 
+----------------+--------------+---------------------+-----------------+
| Name           | Relationship | Address             | Phone           |
+----------------+--------------+---------------------+-----------------+
| Ada/Ed Radhames | ECON         | [place]              | [phone] |
|                |              | [place]  |                 |
|                |              |  [address]              |                 |
+----------------+--------------+---------------------+-----------------+
 
 
 
 
 Care Team Providers
 
 
+-----------------------+------+-------------+
| Care Team Member Name | Role | Phone       |
+-----------------------+------+-------------+
 PCP  | Unavailable |
+-----------------------+------+-------------+
 
 
 
 Encounter Details
 
 
+--------+----------+---------------------+----------------------+-------------+
| Date   | Type     | Department          | Care Team            | Description |
+--------+----------+---------------------+----------------------+-------------+
| / | Abstract |   [facility] [place]         |   Marzena Moser  |             |
|    |          | NEPHROLOGY  [address]   | M, DO  [address]      |             |
|        |          | [address]   | [address]      |             |
|        |          | [place], [place]     | [place]      |             |
|        |          | [address]          | [address]  [phone]  |             |
|        |          | [phone]        |  [phone] (Fax)  |             |
+--------+----------+---------------------+----------------------+-------------+
 
 
 
 Social History
 
 
+--------------+-------+-----------+--------+------+
| Tobacco Use  | Types | Packs/Day | Years  | Date |
|              |       |           | Used   |      |
+--------------+-------+-----------+--------+------+
| Never Smoker |       |           |        |      |
+--------------+-------+-----------+--------+------+
 
 
 
+---------------------+---+---+---+
| Smokeless Tobacco:  |   |   |   |
| Never Used          |   |   |   |
+---------------------+---+---+---+
 
 
 
+---------------------------------------------------------------+
| Comments: some second hand smoke exposure, but fairly minimal |
+---------------------------------------------------------------+
 
 
 
+-------------+-------------+---------+----------+
| Alcohol Use | Drinks/Week | oz/Week | Comments |
+-------------+-------------+---------+----------+
| No          |             |         |          |
+-------------+-------------+---------+----------+
 
 
 
 
+------------------+---------------+
| Sex Assigned at  | Date Recorded |
| Birth            |               |
+------------------+---------------+
| Not on file      |               |
+------------------+---------------+
 
 
 
+----------------+-------------+-------------+
| Job Start Date | Occupation  | Industry    |
+----------------+-------------+-------------+
| Not on file    | Not on file | Not on file |
+----------------+-------------+-------------+
 
 
 
+----------------+--------------+------------+
| Travel History | Travel Start | Travel End |
+----------------+--------------+------------+
 
 
 
+-------------------------------------+
| No recent travel history available. |
+-------------------------------------+
 documented as of this encounter
 
 Plan of Treatment
 
 
+--------+-----------+------------+----------------------+-------------------+
| Date   | Type      | Specialty  | Care Team            | Description       |
+--------+-----------+------------+----------------------+-------------------+
| / | Office    | Cardiology |   Tonia Wilson,    |                   |
|    | Visit     |            | ARNJESSICA  [address]   |                   |
|        |           |            | [place]  |                   |
|        |           |            | [address]  [phone]  |                   |
|        |           |            |  [phone] (Fax)  |                   |
+--------+-----------+------------+----------------------+-------------------+
| / | Hospital  | Radiology  |   Popeye Townsend, |                   |
|    | Encounter |            |  MD  [address] |                   |
|        |           |            |  St.  [place],   |                   |
|        |           |            | [place] [address]             |                   |
|        |           |            | [phone]         |                   |
|        |           |            | [phone] (Fax)   |                   |
+--------+-----------+------------+----------------------+-------------------+
| / | Surgery   | Radiology  |   Popeye Townsend, | CV EP PPM SYSTEM  |
|    |           |            |  MD  [address] | IMPLANT           |
|        |           |            |  St.  [place],   |                   |
|        |           |            | [place] [address]             |                   |
|        |           |            | [phone]         |                   |
|        |           |            | [phone] (Fax)   |                   |
+--------+-----------+------------+----------------------+-------------------+
| 02/10/ | Clinical  | Cardiology |                      |                   |
|    | Support   |            |                      |                   |
+--------+-----------+------------+----------------------+-------------------+
| / | Office    | Cardiology |   Tonia Wilson,    |                   |
|    | Visit     |            | ARNP  [address]   |                   |
 
|        |           |            | St  [place], [place]  |                   |
|        |           |            | [address]  [phone]  |                   |
|        |           |            |  [phone] (Fax)  |                   |
+--------+-----------+------------+----------------------+-------------------+
| / | Off-Site  | Nephrology |   Marzena Moser  |                   |
|    | Visit     |            | ETIENNE,   [address] [place] [address]      |                   |
|        |           |            | [address]      |                   |
|        |           |            | [place]      |                   |
|        |           |            | [address]  [phone]  |                   |
|        |           |            |  [phone] (Fax)  |                   |
+--------+-----------+------------+----------------------+-------------------+
 documented as of this encounter
 
 Procedures
 
 
+---------------------+--------+------------+----------------------+----------------------+
| Procedure Name      | Priori | Date/Time  | Associated Diagnosis | Comments             |
|                     | ty     |            |                      |                      |
+---------------------+--------+------------+----------------------+----------------------+
| TACROLIMUS ()  | Routin | 2017 |                      |   Results for this   |
| TROUGH              | e      |            |                      | procedure are in the |
|                     |        |            |                      |  results section.    |
+---------------------+--------+------------+----------------------+----------------------+
 documented in this encounter
 
 Results
 Tacrolimus () Trough (2017)
 
+-------------+-------+-----------+------------+--------------+
| Component   | Value | Ref Range | Performed  | Pathologist  |
|             |       |           | At         | Signature    |
+-------------+-------+-----------+------------+--------------+
| Tacrolimus, | 6.6   |           |            |              |
|  CMIA,      |       |           |            |              |
| External    |       |           |            |              |
+-------------+-------+-----------+------------+--------------+
 
 
 
+----------+
| Specimen |
+----------+
| Blood    |
+----------+
 documented in this encounter
 
 Visit Diagnoses
 Not on filedocumented in this encounter

## 2020-01-08 NOTE — XMS
Encounter Summary
  Created on: 2020
 
 Viviana Ricci
 External Reference #: [id number]
 : 47
 Sex: Female
 
 Demographics
 
 
+-----------------------+----------------------+
| Address               | [address]      |
|                       | [place]  [address] |
+-----------------------+----------------------+
| Home Phone            | [phone]      |
+-----------------------+----------------------+
| Preferred Language    | Unknown              |
+-----------------------+----------------------+
| Marital Status        | Single               |
+-----------------------+----------------------+
| Voodoo Affiliation | 1009                 |
+-----------------------+----------------------+
| Race                  | Unknown              |
+-----------------------+----------------------+
| Ethnic Group          | Unknown              |
+-----------------------+----------------------+
 
 
 Author
 
 
+--------------+--------------------------------------------+
| Author       | [facility]  |
|              | and [place]                                |
+--------------+--------------------------------------------+
| Organization | [facility]  |
|              | and [place]                                |
+--------------+--------------------------------------------+
| Address      | Unknown                                    |
+--------------+--------------------------------------------+
| Phone        | Unavailable                                |
+--------------+--------------------------------------------+
 
 
 
 Support
 
 
+----------------+--------------+---------------------+-----------------+
| Name           | Relationship | Address             | Phone           |
+----------------+--------------+---------------------+-----------------+
| Ada/Ed Radhames | ECON         | [place]              | [phone] |
|                |              | [place]  |                 |
|                |              |  [address]              |                 |
+----------------+--------------+---------------------+-----------------+
 
 
 
 
 Care Team Providers
 
 
+-----------------------+------+-------------+
| Care Team Member Name | Role | Phone       |
+-----------------------+------+-------------+
 PCP  | Unavailable |
+-----------------------+------+-------------+
 
 
 
 Reason for Visit
 
 
+-------------------+----------+
| Reason            | Comments |
+-------------------+----------+
| Medication Refill |          |
+-------------------+----------+
 
 
 
 Encounter Details
 
 
+--------+--------+---------------------+----------------------+-------------------+
| Date   | Type   | Department          | Care Team            | Description       |
+--------+--------+---------------------+----------------------+-------------------+
| / | Refill |   [place]         |   Marzena Moser  | Medication Refill |
|    |        | NEPHROLOGY  [address]   | M, DO  [address]      |                   |
|        |        | [address]   | [address]      |                   |
|        |        | [place], [place]     | [place], [place]      |                   |
|        |        | [address]          | [address]  [phone]  |                   |
|        |        | [phone]        |  [phone] (Fax)  |                   |
+--------+--------+---------------------+----------------------+-------------------+
 
 
 
 Social History
 
 
+--------------+-------+-----------+--------+------+
| Tobacco Use  | Types | Packs/Day | Years  | Date |
|              |       |           | Used   |      |
+--------------+-------+-----------+--------+------+
| Never Smoker |       |           |        |      |
+--------------+-------+-----------+--------+------+
 
 
 
+---------------------+---+---+---+
| Smokeless Tobacco:  |   |   |   |
| Never Used          |   |   |   |
+---------------------+---+---+---+
 
 
 
+---------------------------------------------------------------+
| Comments: some second hand smoke exposure, but fairly minimal |
 
+---------------------------------------------------------------+
 
 
 
+-------------+-------------+---------+----------+
| Alcohol Use | Drinks/Week | oz/Week | Comments |
+-------------+-------------+---------+----------+
| No          |             |         |          |
+-------------+-------------+---------+----------+
 
 
 
+------------------+---------------+
| Sex Assigned at  | Date Recorded |
| Birth            |               |
+------------------+---------------+
| Not on file      |               |
+------------------+---------------+
 
 
 
+----------------+-------------+-------------+
| Job Start Date | Occupation  | Industry    |
+----------------+-------------+-------------+
| Not on file    | Not on file | Not on file |
+----------------+-------------+-------------+
 
 
 
+----------------+--------------+------------+
| Travel History | Travel Start | Travel End |
+----------------+--------------+------------+
 
 
 
+-------------------------------------+
| No recent travel history available. |
+-------------------------------------+
 documented as of this encounter
 
 Plan of Treatment
 
 
+--------+-----------+------------+----------------------+-------------------+
| Date   | Type      | Specialty  | Care Team            | Description       |
+--------+-----------+------------+----------------------+-------------------+
| / | Office    | Cardiology |   HellalfredoTonia,    |                   |
|    | Visit     |            | ARNP  [address]   |                   |
|        |           |            | St  [place], [place]  |                   |
|        |           |            | [address]  [phone]  |                   |
|        |           |            |  [phone] (Fax)  |                   |
+--------+-----------+------------+----------------------+-------------------+
| / | Hospital  | Radiology  |   Popeye Townsend, |                   |
|    | Encounter |            |  MD  [address] |                   |
|        |           |            |  St.  [place],   |                   |
|        |           |            | [place] [address]             |                   |
|        |           |            | [phone]         |                   |
|        |           |            | [phone] (Fax)   |                   |
+--------+-----------+------------+----------------------+-------------------+
| / | Surgery   | Radiology  |   Popeye Townsend, | CV EP PPM SYSTEM  |
 
|    |           |            |  MD  [address] | IMPLANT           |
|        |           |            |  St.  [place],   |                   |
|        |           |            | [place] [address]             |                   |
|        |           |            | [phone]         |                   |
|        |           |            | [phone] (Fax)   |                   |
+--------+-----------+------------+----------------------+-------------------+
| 02/10/ | Clinical  | Cardiology |                      |                   |
|    | Support   |            |                      |                   |
+--------+-----------+------------+----------------------+-------------------+
| / | Office    | Cardiology |   Tonia Wilson,    |                   |
|    | Visit     |            | ARNP  [address]   |                   |
|        |           |            | [place]  |                   |
|        |           |            | [address]  [phone]  |                   |
|        |           |            |  [phone] (Fax)  |                   |
+--------+-----------+------------+----------------------+-------------------+
| / | Off-Site  | Nephrology |   Marzena Moser  |                   |
|    | Visit     |            | DO ETIENNE  [address] [place] [address]      |                   |
|        |           |            | [address]      |                   |
|        |           |            | [place]      |                   |
|        |           |            | [address]  [phone]  |                   |
|        |           |            |  [phone] (Fax)  |                   |
+--------+-----------+------------+----------------------+-------------------+
 documented as of this encounter
 
 Visit Diagnoses
 Not on filedocumented in this encounter

## 2020-01-08 NOTE — XMS
Encounter Summary
  Created on: 2020
 
 Viviana Ricci
 External Reference #: [id number]
 : 47
 Sex: Female
 
 Demographics
 
 
+-----------------------+----------------------+
| Address               | [address]      |
|                       | [place]  [address] |
+-----------------------+----------------------+
| Home Phone            | [phone]      |
+-----------------------+----------------------+
| Preferred Language    | Unknown              |
+-----------------------+----------------------+
| Marital Status        | Single               |
+-----------------------+----------------------+
| Taoism Affiliation | 1009                 |
+-----------------------+----------------------+
| Race                  | Unknown              |
+-----------------------+----------------------+
| Ethnic Group          | Unknown              |
+-----------------------+----------------------+
 
 
 Author
 
 
+--------------+--------------------------------------------+
| Author       | [facility]  |
|              | and [facility]                                |
+--------------+--------------------------------------------+
| Organization | [facility]  |
|              | and [facility]                                |
+--------------+--------------------------------------------+
| Address      | Unknown                                    |
+--------------+--------------------------------------------+
| Phone        | Unavailable                                |
+--------------+--------------------------------------------+
 
 
 
 Support
 
 
+----------------+--------------+---------------------+-----------------+
| Name           | Relationship | Address             | Phone           |
+----------------+--------------+---------------------+-----------------+
| Ada/Ed Radhames | ECON         | [place]              | [phone] |
|                |              | [place]  |                 |
|                |              |  [address]              |                 |
+----------------+--------------+---------------------+-----------------+
 
 
 
 
 Care Team Providers
 
 
+-----------------------+------+-------------+
| Care Team Member Name | Role | Phone       |
+-----------------------+------+-------------+
 PCP  | Unavailable |
+-----------------------+------+-------------+
 
 
 
 Reason for Visit
 
 
+----------------+----------+
| Reason         | Comments |
+----------------+----------+
| Urinary Tract  |          |
| Infection      |          |
+----------------+----------+
 
 
 
 Encounter Details
 
 
+--------+-----------+---------------------+----------------------+----------------+
| Date   | Type      | Department          | Care Team            | Description    |
+--------+-----------+---------------------+----------------------+----------------+
| / | Telephone |   [facility]         |   Marzena Moser  | Urinary Tract  |
|    |           | NEPHROLOGY  [address]   | M, DO  [address]      | Infection      |
|        |           | [address]   | [address]      |                |
|        |           | [place], [place]     | [place], [place]      |                |
|        |           | [address]          | [address]  [phone]  |                |
|        |           | [phone]        |  [phone] (Fax)  |                |
+--------+-----------+---------------------+----------------------+----------------+
 
 
 
 Social History
 
 
+--------------+-------+-----------+--------+------+
| Tobacco Use  | Types | Packs/Day | Years  | Date |
|              |       |           | Used   |      |
+--------------+-------+-----------+--------+------+
| Never Smoker |       |           |        |      |
+--------------+-------+-----------+--------+------+
 
 
 
+---------------------+---+---+---+
| Smokeless Tobacco:  |   |   |   |
| Never Used          |   |   |   |
+---------------------+---+---+---+
 
 
 
+---------------------------------------------------------------+
 
| Comments: some second hand smoke exposure, but fairly minimal |
+---------------------------------------------------------------+
 
 
 
+-------------+-------------+---------+----------+
| Alcohol Use | Drinks/Week | oz/Week | Comments |
+-------------+-------------+---------+----------+
| No          |             |         |          |
+-------------+-------------+---------+----------+
 
 
 
+------------------+---------------+
| Sex Assigned at  | Date Recorded |
| Birth            |               |
+------------------+---------------+
| Not on file      |               |
+------------------+---------------+
 
 
 
+----------------+-------------+-------------+
| Job Start Date | Occupation  | Industry    |
+----------------+-------------+-------------+
| Not on file    | Not on file | Not on file |
+----------------+-------------+-------------+
 
 
 
+----------------+--------------+------------+
| Travel History | Travel Start | Travel End |
+----------------+--------------+------------+
 
 
 
+-------------------------------------+
| No recent travel history available. |
+-------------------------------------+
 documented as of this encounter
 
 Plan of Treatment
 
 
+--------+-----------+------------+----------------------+-------------------+
| Date   | Type      | Specialty  | Care Team            | Description       |
+--------+-----------+------------+----------------------+-------------------+
| / | Office    | Cardiology |   Tonia Wilson,    |                   |
|    | Visit     |            | ARNJESSICA  [address]   |                   |
|        |           |            | St  [facility] [place]  |                   |
|        |           |            | [address]  [phone]  |                   |
|        |           |            |  [phone] (Fax)  |                   |
+--------+-----------+------------+----------------------+-------------------+
| / | Hospital  | Radiology  |   Popeye Townsend, |                   |
|    | Encounter |            |  MD  [address] |                   |
|        |           |            |  [place],   |                   |
|        |           |            | [place] [address]             |                   |
|        |           |            | [phone]         |                   |
|        |           |            | [phone] (Fax)   |                   |
+--------+-----------+------------+----------------------+-------------------+
 
| / | Surgery   | Radiology  |   Popeye Townsend, | CV EP PPM SYSTEM  |
|    |           |            |  MD  [address] | IMPLANT           |
|        |           |            |  St.  [place],   |                   |
|        |           |            | [place] [address]             |                   |
|        |           |            | [phone]         |                   |
|        |           |            | [phone] (Fax)   |                   |
+--------+-----------+------------+----------------------+-------------------+
| 02/10/ | Clinical  | Cardiology |                      |                   |
|    | Support   |            |                      |                   |
+--------+-----------+------------+----------------------+-------------------+
| / | Office    | Cardiology |   Tonia Wilson,    |                   |
|    | Visit     |            | BELKIS  [address]   |                   |
|        |           |            | [place]  |                   |
|        |           |            | [address]  [phone]  |                   |
|        |           |            |  [phone] (Fax)  |                   |
+--------+-----------+------------+----------------------+-------------------+
| / | Off-Site  | Nephrology |   Marzena Moser  |                   |
|    | Visit     |            | DO ETIENNE  [address] [place] [address]      |                   |
|        |           |            | [address]      |                   |
|        |           |            | [place]      |                   |
|        |           |            | [id number] [phone]  |                   |
|        |           |            |  [phone] (Fax)  |                   |
+--------+-----------+------------+----------------------+-------------------+
 documented as of this encounter
 
 Visit Diagnoses
 
 
+-------------------------------------------------------------------------------+
| Diagnosis                                                                     |
+-------------------------------------------------------------------------------+
|   Dysuria - Primary                                                           |
+-------------------------------------------------------------------------------+
|   Sensation of pressure in bladder area  Other specified disorders of bladder |
+-------------------------------------------------------------------------------+
 documented in this encounter

## 2020-01-08 NOTE — XMS
Encounter Summary
  Created on: 2020
 
 Viviana Ricci
 External Reference #: [id number]
 : 47
 Sex: Female
 
 Demographics
 
 
+-----------------------+----------------------+
| Address               | [address]      |
|                       | [place]  [address] |
+-----------------------+----------------------+
| Home Phone            | [phone]      |
+-----------------------+----------------------+
| Preferred Language    | Unknown              |
+-----------------------+----------------------+
| Marital Status        | Single               |
+-----------------------+----------------------+
| Episcopal Affiliation | 1009                 |
+-----------------------+----------------------+
| Race                  | Unknown              |
+-----------------------+----------------------+
| Ethnic Group          | Unknown              |
+-----------------------+----------------------+
 
 
 Author
 
 
+--------------+--------------------------------------------+
| Author       | [facility]  |
|              | and [facility]                                |
+--------------+--------------------------------------------+
| Organization | [facility]  |
|              | and [facility]                                |
+--------------+--------------------------------------------+
| Address      | Unknown                                    |
+--------------+--------------------------------------------+
| Phone        | Unavailable                                |
+--------------+--------------------------------------------+
 
 
 
 Support
 
 
+----------------+--------------+---------------------+-----------------+
| Name           | Relationship | Address             | Phone           |
+----------------+--------------+---------------------+-----------------+
| Ada/Ed Radhames | ECON         | [place]              | [phone] |
|                |              | [place]  |                 |
|                |              |  [address]              |                 |
+----------------+--------------+---------------------+-----------------+
 
 
 
 
 Care Team Providers
 
 
+-----------------------+------+-------------+
| Care Team Member Name | Role | Phone       |
+-----------------------+------+-------------+
 PCP  | Unavailable |
+-----------------------+------+-------------+
 
 
 
 Encounter Details
 
 
+--------+-------------+---------------------+----------------------+---------------------+
| Date   | Type        | Department          | Care Team            | Description         |
+--------+-------------+---------------------+----------------------+---------------------+
| / | Orders Only |   [facility]         |   Marzena Moser  | UTI (lower urinary  |
|    |             | NEPHROLOGY  [address]   | M, DO  [address]      | tract infection)    |
|        |             | [address]   | [address]      | (Primary Dx)        |
|        |             | [place], [place]     | [place], [place]      |                     |
|        |             | [address]          | [address]  [phone]  |                     |
|        |             | [phone]        |  [phone] (Fax)  |                     |
+--------+-------------+---------------------+----------------------+---------------------+
 
 
 
 Social History
 
 
+--------------+-------+-----------+--------+------+
| Tobacco Use  | Types | Packs/Day | Years  | Date |
|              |       |           | Used   |      |
+--------------+-------+-----------+--------+------+
| Never Smoker |       |           |        |      |
+--------------+-------+-----------+--------+------+
 
 
 
+---------------------+---+---+---+
| Smokeless Tobacco:  |   |   |   |
| Never Used          |   |   |   |
+---------------------+---+---+---+
 
 
 
+---------------------------------------------------------------+
| Comments: some second hand smoke exposure, but fairly minimal |
+---------------------------------------------------------------+
 
 
 
+-------------+-------------+---------+----------+
| Alcohol Use | Drinks/Week | oz/Week | Comments |
+-------------+-------------+---------+----------+
| No          |             |         |          |
+-------------+-------------+---------+----------+
 
 
 
 
+------------------+---------------+
| Sex Assigned at  | Date Recorded |
| Birth            |               |
+------------------+---------------+
| Not on file      |               |
+------------------+---------------+
 
 
 
+----------------+-------------+-------------+
| Job Start Date | Occupation  | Industry    |
+----------------+-------------+-------------+
| Not on file    | Not on file | Not on file |
+----------------+-------------+-------------+
 
 
 
+----------------+--------------+------------+
| Travel History | Travel Start | Travel End |
+----------------+--------------+------------+
 
 
 
+-------------------------------------+
| No recent travel history available. |
+-------------------------------------+
 documented as of this encounter
 
 Progress Bobbi Petit RN - 2015  3:41 PM PDTPer DR. Moser, patient was ordered amoxici
llin 500 mg TID x 10 days. Patient called and notified.  Electronically signed by Bobbi mckeon RN at 2015  3:52 PM PDTdocumented in this encounter
 
 Plan of Treatment
 
 
+--------+-----------+------------+----------------------+-------------------+
| Date   | Type      | Specialty  | Care Team            | Description       |
+--------+-----------+------------+----------------------+-------------------+
| / | Office    | Cardiology |   HellalfredoTonia,    |                   |
|    | Visit     |            | ARNP  [address]   |                   |
|        |           |            | St  [place], [place]  |                   |
|        |           |            | [address]  [phone]  |                   |
|        |           |            |  [phone] (Fax)  |                   |
+--------+-----------+------------+----------------------+-------------------+
| / | Hospital  | Radiology  |   Popeye Townsend, |                   |
|    | Encounter |            |  MD  [address] |                   |
|        |           |            |  St.  [place],   |                   |
|        |           |            | [place] [address]             |                   |
|        |           |            | [phone]         |                   |
|        |           |            | [phone] (Fax)   |                   |
+--------+-----------+------------+----------------------+-------------------+
| / | Surgery   | Radiology  |   Popeye Townsend, | CV EP PPM SYSTEM  |
|    |           |            |  MD  [address] | IMPLANT           |
|        |           |            |  St.  [place],   |                   |
|        |           |            | [place] [address]             |                   |
|        |           |            | [phone]         |                   |
|        |           |            | [phone] (Fax)   |                   |
+--------+-----------+------------+----------------------+-------------------+
 
| 02/10/ | Clinical  | Cardiology |                      |                   |
|    | Support   |            |                      |                   |
+--------+-----------+------------+----------------------+-------------------+
| / | Office    | Cardiology |   Tonia Wilson,    |                   |
|    | Visit     |            | ARNP  [address]   |                   |
|        |           |            | [place]  |                   |
|        |           |            | [address]  [phone]  |                   |
|        |           |            |  [phone] (Fax)  |                   |
+--------+-----------+------------+----------------------+-------------------+
| / | Off-Site  | Nephrology |   Marzena Moser  |                   |
|    | Visit     |            | DO ETIENNE  [address] [place] [address]      |                   |
|        |           |            | [address]      |                   |
|        |           |            | [place]      |                   |
|        |           |            | [address]  [phone]  |                   |
|        |           |            |  [phone] (Fax)  |                   |
+--------+-----------+------------+----------------------+-------------------+
 documented as of this encounter
 
 Visit Diagnoses
 
 
+-------------------------------------------------------------------------------------+
| Diagnosis                                                                           |
+-------------------------------------------------------------------------------------+
|   UTI (lower urinary tract infection) - Primary  Urinary tract infection, site not  |
| specified                                                                           |
+-------------------------------------------------------------------------------------+
 documented in this encounter

## 2020-01-08 NOTE — XMS
Encounter Summary
  Created on: 2020
 
 Viviana Ricci
 External Reference #: [id number]
 : 47
 Sex: Female
 
 Demographics
 
 
+-----------------------+----------------------+
| Address               | [address]      |
|                       | [place]  [address] |
+-----------------------+----------------------+
| Home Phone            | [phone]      |
+-----------------------+----------------------+
| Preferred Language    | Unknown              |
+-----------------------+----------------------+
| Marital Status        | Single               |
+-----------------------+----------------------+
| Jewish Affiliation | [id number]                 |
+-----------------------+----------------------+
| Race                  | Unknown              |
+-----------------------+----------------------+
| Ethnic Group          | Unknown              |
+-----------------------+----------------------+
 
 
 Author
 
 
+--------------+--------------------------------------------+
| Author       | [facility]  |
|              | and [place]                                |
+--------------+--------------------------------------------+
| Organization | [facility]  |
|              | and [place]                                |
+--------------+--------------------------------------------+
| Address      | Unknown                                    |
+--------------+--------------------------------------------+
| Phone        | Unavailable                                |
+--------------+--------------------------------------------+
 
 
 
 Support
 
 
+----------------+--------------+---------------------+-----------------+
| Name           | Relationship | Address             | Phone           |
+----------------+--------------+---------------------+-----------------+
| Ada/Ed Radhames | ECON         | [address]              | [phone] |
|                |              | [place]  |                 |
|                |              |  [address]              |                 |
+----------------+--------------+---------------------+-----------------+
 
 
 
 
 Care Team Providers
 
 
+-----------------------+------+-------------+
| Care Team Member Name | Role | Phone       |
+-----------------------+------+-------------+
 PCP  | Unavailable |
+-----------------------+------+-------------+
 
 
 
 Encounter Details
 
 
+--------+----------+----------------------+----------------------+-------------+
| Date   | Type     | Department           | Care Team            | Description |
+--------+----------+----------------------+----------------------+-------------+
| / | Imaging  |   [facility] |   Provider,          |             |
| 2018   | Exam     |  [facility]    | MD Evette  [id number] |             |
|        |          | IMAGING              |  [address]        |             |
|        |          | [phone]         | [place] [address]     |             |
+--------+----------+----------------------+----------------------+-------------+
 
 
 
 Social History
 
 
+--------------+-------+-----------+--------+------+
| Tobacco Use  | Types | Packs/Day | Years  | Date |
|              |       |           | Used   |      |
+--------------+-------+-----------+--------+------+
| Never Smoker |       |           |        |      |
+--------------+-------+-----------+--------+------+
 
 
 
+---------------------+---+---+---+
| Smokeless Tobacco:  |   |   |   |
| Never Used          |   |   |   |
+---------------------+---+---+---+
 
 
 
+---------------------------------------------------------------+
| Comments: some second hand smoke exposure, but fairly minimal |
+---------------------------------------------------------------+
 
 
 
+-------------+-------------+---------+----------+
| Alcohol Use | Drinks/Week | oz/Week | Comments |
+-------------+-------------+---------+----------+
| No          |             |         |          |
+-------------+-------------+---------+----------+
 
 
 
+------------------+---------------+
 
| Sex Assigned at  | Date Recorded |
| Birth            |               |
+------------------+---------------+
| Not on file      |               |
+------------------+---------------+
 
 
 
+----------------+-------------+-------------+
| Job Start Date | Occupation  | Industry    |
+----------------+-------------+-------------+
| Not on file    | Not on file | Not on file |
+----------------+-------------+-------------+
 
 
 
+----------------+--------------+------------+
| Travel History | Travel Start | Travel End |
+----------------+--------------+------------+
 
 
 
+-------------------------------------+
| No recent travel history available. |
+-------------------------------------+
 documented as of this encounter
 
 Plan of Treatment
 
 
+--------+-----------+------------+----------------------+-------------------+
| Date   | Type      | Specialty  | Care Team            | Description       |
+--------+-----------+------------+----------------------+-------------------+
| / | Office    | Cardiology |   Tonia Wilson,    |                   |
|    | Visit     |            | ARNP  [address]   |                   |
|        |           |            |   [place]  |                   |
|        |           |            | [address]  [phone]  |                   |
|        |           |            |  [phone] (Fax)  |                   |
+--------+-----------+------------+----------------------+-------------------+
| / | Hospital  | Radiology  |   Popeye Townsend, |                   |
|    | Encounter |            |  MD  [address] |                   |
|        |           |            |  St.  [place],   |                   |
|        |           |            | [place] [address]             |                   |
|        |           |            | [phone]         |                   |
|        |           |            | [phone] (Fax)   |                   |
+--------+-----------+------------+----------------------+-------------------+
| / | Surgery   | Radiology  |   Popeye Townsend, | CV EP PPM SYSTEM  |
|    |           |            |  MD  [address] | IMPLANT           |
|        |           |            |  St.  [place],   |                   |
|        |           |            | [place] [address]             |                   |
|        |           |            | [phone]         |                   |
|        |           |            | [phone] (Fax)   |                   |
+--------+-----------+------------+----------------------+-------------------+
| 02/10/ | Clinical  | Cardiology |                      |                   |
|    | Support   |            |                      |                   |
+--------+-----------+------------+----------------------+-------------------+
| / | Office    | Cardiology |   Tonia Wilson,    |                   |
|    | Visit     |            | ARNP  [address]   |                   |
|        |           |            | St  [place]  |                   |
|        |           |            | [address]  [phone]  |                   |
 
|        |           |            |  [phone] (Fax)  |                   |
+--------+-----------+------------+----------------------+-------------------+
| / | Off-Site  | Nephrology |   Shanti Marzena  |                   |
|    | Visit     |            | M, DO  [address]      |                   |
|        |           |            | [address]      |                   |
|        |           |            | [place]      |                   |
|        |           |            | [address]  [phone]  |                   |
|        |           |            |  [phone] (Fax)  |                   |
+--------+-----------+------------+----------------------+-------------------+
 documented as of this encounter
 
 Procedures
 
 
+------------------+--------+-------------+----------------------+----------------------+
| Procedure Name   | Priori | Date/Time   | Associated Diagnosis | Comments             |
|                  | ty     |             |                      |                      |
+------------------+--------+-------------+----------------------+----------------------+
| XR CHEST 2 VIEWS | Routin | 2016  |                      |   Results for this   |
|                  | e      |  1:20 PM    |                      | procedure are in the |
|                  |        | PST         |                      |  results section.    |
+------------------+--------+-------------+----------------------+----------------------+
 documented in this encounter
 
 Results
 XR Chest 2 Vws (2016  1:20 PM PST)
 
+----------+
| Specimen |
+----------+
|          |
+----------+
 
 
 
+-----------------------------------------+---------------+
| Narrative                               | Performed At  |
+-----------------------------------------+---------------+
|   External films for comparison only    |   [facility] |
|                                         |               |
|  No results will be in the chart.       |               |
+-----------------------------------------+---------------+
 
 
 
+---------------+---------+--------------------+--------------+
| Performing    | Address | City/State/Zipcode | Phone Number |
| Organization  |         |                    |              |
+---------------+---------+--------------------+--------------+
|   [facility] |         |                    |              |
+---------------+---------+--------------------+--------------+
 documented in this encounter
 
 Visit Diagnoses
 Not on filedocumented in this encounter

## 2020-01-08 NOTE — XMS
Encounter Summary
  Created on: 2020
 
 Viviana Ricci
 External Reference #: [id number]
 : 47
 Sex: Female
 
 Demographics
 
 
+-----------------------+----------------------+
| Address               | [address]      |
|                       | [place]  [address] |
+-----------------------+----------------------+
| Home Phone            | [phone]      |
+-----------------------+----------------------+
| Preferred Language    | Unknown              |
+-----------------------+----------------------+
| Marital Status        | Single               |
+-----------------------+----------------------+
| Quaker Affiliation | 1009                 |
+-----------------------+----------------------+
| Race                  | Unknown              |
+-----------------------+----------------------+
| Ethnic Group          | Unknown              |
+-----------------------+----------------------+
 
 
 Author
 
 
+--------------+--------------------------------------------+
| Author       | [facility]  |
|              | and [place]                                |
+--------------+--------------------------------------------+
| Organization | [facility]  |
|              | and [place]                                |
+--------------+--------------------------------------------+
| Address      | Unknown                                    |
+--------------+--------------------------------------------+
| Phone        | Unavailable                                |
+--------------+--------------------------------------------+
 
 
 
 Support
 
 
+----------------+--------------+---------------------+-----------------+
| Name           | Relationship | Address             | Phone           |
+----------------+--------------+---------------------+-----------------+
| Ada/Ed Radhames | ECON         | [address]              | [phone] |
|                |              | [place]  |                 |
|                |              |  [address]              |                 |
+----------------+--------------+---------------------+-----------------+
 
 
 
 
 Care Team Providers
 
 
+-----------------------+------+-------------+
| Care Team Member Name | Role | Phone       |
+-----------------------+------+-------------+
 PCP  | Unavailable |
+-----------------------+------+-------------+
 
 
 
 Reason for Visit
 
 
+-------------------+----------+
| Reason            | Comments |
+-------------------+----------+
| Medication Refill |          |
+-------------------+----------+
 
 
 
 Encounter Details
 
 
+--------+--------+---------------------+----------------------+-------------------+
| Date   | Type   | Department          | Care Team            | Description       |
+--------+--------+---------------------+----------------------+-------------------+
| 11/15/ | Refill |   [facility]         |   Marzena Moser  | Medication Refill |
| 2017   |        | NEPHROLOGY  [address]   | M, DO  [address]      |                   |
|        |        | [address]   | [address]      |                   |
|        |        | [place], [place]     | [place], [place]      |                   |
|        |        | [address]          | [address]  [phone]  |                   |
|        |        | [phone]        |  [phone] (Fax)  |                   |
+--------+--------+---------------------+----------------------+-------------------+
 
 
 
 Social History
 
 
+--------------+-------+-----------+--------+------+
| Tobacco Use  | Types | Packs/Day | Years  | Date |
|              |       |           | Used   |      |
+--------------+-------+-----------+--------+------+
| Never Smoker |       |           |        |      |
+--------------+-------+-----------+--------+------+
 
 
 
+---------------------+---+---+---+
| Smokeless Tobacco:  |   |   |   |
| Never Used          |   |   |   |
+---------------------+---+---+---+
 
 
 
+---------------------------------------------------------------+
| Comments: some second hand smoke exposure, but fairly minimal |
 
+---------------------------------------------------------------+
 
 
 
+-------------+-------------+---------+----------+
| Alcohol Use | Drinks/Week | oz/Week | Comments |
+-------------+-------------+---------+----------+
| No          |             |         |          |
+-------------+-------------+---------+----------+
 
 
 
+------------------+---------------+
| Sex Assigned at  | Date Recorded |
| Birth            |               |
+------------------+---------------+
| Not on file      |               |
+------------------+---------------+
 
 
 
+----------------+-------------+-------------+
| Job Start Date | Occupation  | Industry    |
+----------------+-------------+-------------+
| Not on file    | Not on file | Not on file |
+----------------+-------------+-------------+
 
 
 
+----------------+--------------+------------+
| Travel History | Travel Start | Travel End |
+----------------+--------------+------------+
 
 
 
+-------------------------------------+
| No recent travel history available. |
+-------------------------------------+
 documented as of this encounter
 
 Plan of Treatment
 
 
+--------+-----------+------------+----------------------+-------------------+
| Date   | Type      | Specialty  | Care Team            | Description       |
+--------+-----------+------------+----------------------+-------------------+
| / | Office    | Cardiology |   HellalfredoTonia,    |                   |
|    | Visit     |            | ARNP  [address]   |                   |
|        |           |            | St  [place], [place]  |                   |
|        |           |            | [address]  [phone]  |                   |
|        |           |            |  [phone] (Fax)  |                   |
+--------+-----------+------------+----------------------+-------------------+
| / | Hospital  | Radiology  |   Popeye Townsend, |                   |
|    | Encounter |            |  MD  [address] |                   |
|        |           |            |  St.  [place],   |                   |
|        |           |            | [place] [address]             |                   |
|        |           |            | [phone]         |                   |
|        |           |            | [phone] (Fax)   |                   |
+--------+-----------+------------+----------------------+-------------------+
| / | Surgery   | Radiology  |   Popeye Townsend, | CV EP PPM SYSTEM  |
 
|    |           |            |  MD  [address] | IMPLANT           |
|        |           |            |  St.  [place],   |                   |
|        |           |            | [place] [address]             |                   |
|        |           |            | [phone]         |                   |
|        |           |            | [phone] (Fax)   |                   |
+--------+-----------+------------+----------------------+-------------------+
| 02/10/ | Clinical  | Cardiology |                      |                   |
|    | Support   |            |                      |                   |
+--------+-----------+------------+----------------------+-------------------+
| / | Office    | Cardiology |   Tonia Wilson,    |                   |
|    | Visit     |            | ARNP  [address]   |                   |
|        |           |            | [place]  |                   |
|        |           |            | [address]  [phone]  |                   |
|        |           |            |  [phone] (Fax)  |                   |
+--------+-----------+------------+----------------------+-------------------+
| / | Off-Site  | Nephrology |   Marzena Moser  |                   |
|    | Visit     |            | DO ETIENNE  [address] [place] [address]      |                   |
|        |           |            | [address]      |                   |
|        |           |            | [place]      |                   |
|        |           |            | [address]  [phone]  |                   |
|        |           |            |  [phone] (Fax)  |                   |
+--------+-----------+------------+----------------------+-------------------+
 documented as of this encounter
 
 Visit Diagnoses
 Not on filedocumented in this encounter

## 2020-01-08 NOTE — XMS
Encounter Summary
  Created on: 2020
 
 Viviana Ricci
 External Reference #: [id number]
 : 47
 Sex: Female
 
 Demographics
 
 
+-----------------------+----------------------+
| Address               | [address]      |
|                       | [place]  [address] |
+-----------------------+----------------------+
| Home Phone            | [phone]      |
+-----------------------+----------------------+
| Preferred Language    | Unknown              |
+-----------------------+----------------------+
| Marital Status        | Single               |
+-----------------------+----------------------+
| Sikhism Affiliation | [id number]                 |
+-----------------------+----------------------+
| Race                  | Unknown              |
+-----------------------+----------------------+
| Ethnic Group          | Unknown              |
+-----------------------+----------------------+
 
 
 Author
 
 
+--------------+--------------------------------------------+
| Author       | [facility]  |
|              | and [facility]                                |
+--------------+--------------------------------------------+
| Organization | [facility]  |
|              | and [facility]                                |
+--------------+--------------------------------------------+
| Address      | Unknown                                    |
+--------------+--------------------------------------------+
| Phone        | Unavailable                                |
+--------------+--------------------------------------------+
 
 
 
 Support
 
 
+----------------+--------------+---------------------+-----------------+
| Name           | Relationship | Address             | Phone           |
+----------------+--------------+---------------------+-----------------+
| Ada/Ed Radhames | ECON         | [place]              | [phone] |
|                |              | [place]  |                 |
|                |              |  [address]              |                 |
+----------------+--------------+---------------------+-----------------+
 
 
 
 
 Care Team Providers
 
 
+-----------------------+------+-------------+
| Care Team Member Name | Role | Phone       |
+-----------------------+------+-------------+
 PCP  | Unavailable |
+-----------------------+------+-------------+
 
 
 
 Reason for Visit
 
 
+-------------------+----------+
| Reason            | Comments |
+-------------------+----------+
| Medication Refill |          |
+-------------------+----------+
 
 
 
 Encounter Details
 
 
+--------+--------+---------------------+----------------------+-------------------+
| Date   | Type   | Department          | Care Team            | Description       |
+--------+--------+---------------------+----------------------+-------------------+
| / | Refill |   [facility]         |   Marzena Moser  | Medication Refill |
| 2016   |        | NEPHROLOGY  [address]   | M, DO  [address]      |                   |
|        |        | [address]   | [address]      |                   |
|        |        | [place], [place]     | [place], [place]      |                   |
|        |        | [address]          | [address]  [phone]  |                   |
|        |        | [phone]        |  [phone] (Fax)  |                   |
+--------+--------+---------------------+----------------------+-------------------+
 
 
 
 Social History
 
 
+--------------+-------+-----------+--------+------+
| Tobacco Use  | Types | Packs/Day | Years  | Date |
|              |       |           | Used   |      |
+--------------+-------+-----------+--------+------+
| Never Smoker |       |           |        |      |
+--------------+-------+-----------+--------+------+
 
 
 
+---------------------+---+---+---+
| Smokeless Tobacco:  |   |   |   |
| Never Used          |   |   |   |
+---------------------+---+---+---+
 
 
 
+---------------------------------------------------------------+
| Comments: some second hand smoke exposure, but fairly minimal |
 
+---------------------------------------------------------------+
 
 
 
+-------------+-------------+---------+----------+
| Alcohol Use | Drinks/Week | oz/Week | Comments |
+-------------+-------------+---------+----------+
| No          |             |         |          |
+-------------+-------------+---------+----------+
 
 
 
+------------------+---------------+
| Sex Assigned at  | Date Recorded |
| Birth            |               |
+------------------+---------------+
| Not on file      |               |
+------------------+---------------+
 
 
 
+----------------+-------------+-------------+
| Job Start Date | Occupation  | Industry    |
+----------------+-------------+-------------+
| Not on file    | Not on file | Not on file |
+----------------+-------------+-------------+
 
 
 
+----------------+--------------+------------+
| Travel History | Travel Start | Travel End |
+----------------+--------------+------------+
 
 
 
+-------------------------------------+
| No recent travel history available. |
+-------------------------------------+
 documented as of this encounter
 
 Plan of Treatment
 
 
+--------+-----------+------------+----------------------+-------------------+
| Date   | Type      | Specialty  | Care Team            | Description       |
+--------+-----------+------------+----------------------+-------------------+
| / | Office    | Cardiology |   HellalfredoTonia,    |                   |
|    | Visit     |            | ARNP  [address]   |                   |
|        |           |            | St  [place], [place]  |                   |
|        |           |            | [address]  [phone]  |                   |
|        |           |            |  [phone] (Fax)  |                   |
+--------+-----------+------------+----------------------+-------------------+
| / | Hospital  | Radiology  |   Popeye Townsend, |                   |
|    | Encounter |            |  MD  [address] |                   |
|        |           |            |  St.  [place],   |                   |
|        |           |            | [place] [address]             |                   |
|        |           |            | [phone]         |                   |
|        |           |            | [phone] (Fax)   |                   |
+--------+-----------+------------+----------------------+-------------------+
| / | Surgery   | Radiology  |   Popeye Townsend, | CV EP PPM SYSTEM  |
 
|    |           |            |  MD  [address] | IMPLANT           |
|        |           |            |  St.  [place],   |                   |
|        |           |            | [place] [address]             |                   |
|        |           |            | [phone]         |                   |
|        |           |            | [phone] (Fax)   |                   |
+--------+-----------+------------+----------------------+-------------------+
| 02/10/ | Clinical  | Cardiology |                      |                   |
|    | Support   |            |                      |                   |
+--------+-----------+------------+----------------------+-------------------+
| / | Office    | Cardiology |   Tonia Wilson,    |                   |
|    | Visit     |            | ARNP  [address]   |                   |
|        |           |            | [place]  |                   |
|        |           |            | [address]  [phone]  |                   |
|        |           |            |  [phone] (Fax)  |                   |
+--------+-----------+------------+----------------------+-------------------+
| / | Off-Site  | Nephrology |   Marzena Moser  |                   |
|    | Visit     |            | DO ETIENNE  [address] [place] [address]      |                   |
|        |           |            | [address]      |                   |
|        |           |            | [place]      |                   |
|        |           |            | [address]  [phone]  |                   |
|        |           |            |  [phone] (Fax)  |                   |
+--------+-----------+------------+----------------------+-------------------+
 documented as of this encounter
 
 Visit Diagnoses
 Not on filedocumented in this encounter

## 2020-01-08 NOTE — XMS
Encounter Summary
  Created on: 2020
 
 Viviana Ricci
 External Reference #: [id number]
 : 47
 Sex: Female
 
 Demographics
 
 
+-----------------------+----------------------+
| Address               | [address]      |
|                       | [place]  [address] |
+-----------------------+----------------------+
| Home Phone            | [phone]      |
+-----------------------+----------------------+
| Preferred Language    | Unknown              |
+-----------------------+----------------------+
| Marital Status        | Single               |
+-----------------------+----------------------+
| Episcopal Affiliation | 1009                 |
+-----------------------+----------------------+
| Race                  | Unknown              |
+-----------------------+----------------------+
| Ethnic Group          | Unknown              |
+-----------------------+----------------------+
 
 
 Author
 
 
+--------------+--------------------------------------------+
| Author       | [facility]  |
|              | and [place]                                |
+--------------+--------------------------------------------+
| Organization | [facility]  |
|              | and [place]                                |
+--------------+--------------------------------------------+
| Address      | Unknown                                    |
+--------------+--------------------------------------------+
| Phone        | Unavailable                                |
+--------------+--------------------------------------------+
 
 
 
 Support
 
 
+----------------+--------------+---------------------+-----------------+
| Name           | Relationship | Address             | Phone           |
+----------------+--------------+---------------------+-----------------+
| Ada/Ed Radhames | ECON         | [place]              | [phone] |
|                |              | [place]  |                 |
|                |              |  [address]              |                 |
+----------------+--------------+---------------------+-----------------+
 
 
 
 
 Care Team Providers
 
 
+-----------------------+------+-------------+
| Care Team Member Name | Role | Phone       |
+-----------------------+------+-------------+
 PCP  | Unavailable |
+-----------------------+------+-------------+
 
 
 
 Reason for Visit
 
 
+--------+------------------+
| Reason | Comments         |
+--------+------------------+
| Other  | medication level |
+--------+------------------+
 
 
 
 Encounter Details
 
 
+--------+-----------+----------------------+----------------------+--------------------+
| Date   | Type      | Department           | Care Team            | Description        |
+--------+-----------+----------------------+----------------------+--------------------+
| / | Telephone |   [facility]  |   Marzena Moser  | Other (medication  |
|    |           | Care  [address]  | M, DO  [address]      | level)             |
|        |           | [address]  [place],   | [address]      |                    |
|        |           | [place] [address]        | [place], [place]      |                    |
|        |           | [phone]         | [id number] [phone]  |                    |
|        |           |                      |  [phone] (Fax)  |                    |
+--------+-----------+----------------------+----------------------+--------------------+
 
 
 
 Social History
 
 
+--------------+-------+-----------+--------+------+
| Tobacco Use  | Types | Packs/Day | Years  | Date |
|              |       |           | Used   |      |
+--------------+-------+-----------+--------+------+
| Never Smoker |       |           |        |      |
+--------------+-------+-----------+--------+------+
 
 
 
+---------------------+---+---+---+
| Smokeless Tobacco:  |   |   |   |
| Never Used          |   |   |   |
+---------------------+---+---+---+
 
 
 
+-------------+-------------+---------+----------+
| Alcohol Use | Drinks/Week | oz/Week | Comments |
 
+-------------+-------------+---------+----------+
| No          |             |         |          |
+-------------+-------------+---------+----------+
 
 
 
+------------------+---------------+
| Sex Assigned at  | Date Recorded |
| Birth            |               |
+------------------+---------------+
| Not on file      |               |
+------------------+---------------+
 
 
 
+----------------+-------------+-------------+
| Job Start Date | Occupation  | Industry    |
+----------------+-------------+-------------+
| Not on file    | Not on file | Not on file |
+----------------+-------------+-------------+
 
 
 
+----------------+--------------+------------+
| Travel History | Travel Start | Travel End |
+----------------+--------------+------------+
 
 
 
+-------------------------------------+
| No recent travel history available. |
+-------------------------------------+
 documented as of this encounter
 
 Plan of Treatment
 
 
+--------+-----------+------------+----------------------+-------------------+
| Date   | Type      | Specialty  | Care Team            | Description       |
+--------+-----------+------------+----------------------+-------------------+
| / | Office    | Cardiology |   Tonia Wilson,    |                   |
|    | Visit     |            | [address]   |                   |
|        |           |            | St [place] [facility], [place]  |                   |
|        |           |            | [id number] [phone]  |                   |
|        |           |            |  [phone] (Fax)  |                   |
+--------+-----------+------------+----------------------+-------------------+
| / | Hospital  | Radiology  |   Popeye Townsend, |                   |
|    | Encounter |            |  MD  [address] |                   |
|        |           |            |  St.  [place],   |                   |
|        |           |            | [place] [address]             |                   |
|        |           |            | [phone]         |                   |
|        |           |            | [phone] (Fax)   |                   |
+--------+-----------+------------+----------------------+-------------------+
| / | Surgery   | Radiology  |   Popeye Townsend, | CV EP PPM SYSTEM  |
|    |           |            |  MD  [address] | IMPLANT           |
|        |           |            |  St.  [place],   |                   |
|        |           |            | [place] [address]             |                   |
|        |           |            | [phone]         |                   |
|        |           |            | [phone] (Fax)   |                   |
+--------+-----------+------------+----------------------+-------------------+
 
| 02/10/ | Clinical  | Cardiology |                      |                   |
|    | Support   |            |                      |                   |
+--------+-----------+------------+----------------------+-------------------+
| / | Office    | Cardiology |   Tonia Wilson,    |                   |
|    | Visit     |            | ARNJESSICA  [address]   |                   |
|        |           |            | St  [place], [place]  |                   |
|        |           |            | [address]  [phone]  |                   |
|        |           |            |  [phone] (Fax)  |                   |
+--------+-----------+------------+----------------------+-------------------+
| / | Off-Site  | Nephrology |   Marzena Moser  |                   |
|    | Visit     |            | DO ETIENNE  [address] [place] [address]      |                   |
|        |           |            | [address]      |                   |
|        |           |            | [place]      |                   |
|        |           |            | [address]  [phone]  |                   |
|        |           |            |  [phone] (Fax)  |                   |
+--------+-----------+------------+----------------------+-------------------+
 documented as of this encounter
 
 Visit Diagnoses
 Not on filedocumented in this encounter

## 2020-01-08 NOTE — XMS
Encounter Summary
  Created on: 2020
 
 Viviana Ricci
 External Reference #: [id number]
 : 47
 Sex: Female
 
 Demographics
 
 
+-----------------------+----------------------+
| Address               | [address]      |
|                       | [place]  [address] |
+-----------------------+----------------------+
| Home Phone            | [phone]      |
+-----------------------+----------------------+
| Preferred Language    | Unknown              |
+-----------------------+----------------------+
| Marital Status        | Single               |
+-----------------------+----------------------+
| Yazdanism Affiliation | [id number]                 |
+-----------------------+----------------------+
| Race                  | Unknown              |
+-----------------------+----------------------+
| Ethnic Group          | Unknown              |
+-----------------------+----------------------+
 
 
 Author
 
 
+--------------+--------------------------------------------+
| Author       | [facility]  |
|              | and [place]                                |
+--------------+--------------------------------------------+
| Organization | [facility]  |
|              | and [place]                                |
+--------------+--------------------------------------------+
| Address      | Unknown                                    |
+--------------+--------------------------------------------+
| Phone        | Unavailable                                |
+--------------+--------------------------------------------+
 
 
 
 Support
 
 
+----------------+--------------+---------------------+-----------------+
| Name           | Relationship | Address             | Phone           |
+----------------+--------------+---------------------+-----------------+
| Ada/Ed Radhames | ECON         | [address]              | [phone] |
|                |              | [place]  |                 |
|                |              |  [id number]              |                 |
+----------------+--------------+---------------------+-----------------+
 
 
 
 
 Care Team Providers
 
 
+-----------------------+------+-------------+
| Care Team Member Name | Role | Phone       |
+-----------------------+------+-------------+
 PCP  | Unavailable |
+-----------------------+------+-------------+
 
 
 
 Encounter Details
 
 
+--------+-----------+----------------------+----------------------+-------------+
| Date   | Type      | Department           | Care Team            | Description |
+--------+-----------+----------------------+----------------------+-------------+
| / | [facility]  |   [facility] |   Marzena Moser  |             |
|    | Encounter |  MED CTR XRAY  [address] | M, DO  [address]      |             |
|        |           |  [place]       | [address]      |             |
|        |           | [place] [address] | [place]      |             |
|        |           |   [phone]       | [address] [phone]  |             |
|        |           |                      |  [phone] (Fax)  |             |
+--------+-----------+----------------------+----------------------+-------------+
 
 
 
 Social History
 
 
+----------------+-------+-----------+--------+------+
| Tobacco Use    | Types | Packs/Day | Years  | Date |
|                |       |           | Used   |      |
+----------------+-------+-----------+--------+------+
| Never Assessed |       |           |        |      |
+----------------+-------+-----------+--------+------+
 
 
 
+------------------+---------------+
| Sex Assigned at  | Date Recorded |
| Birth            |               |
+------------------+---------------+
| Not on file      |               |
+------------------+---------------+
 
 
 
+----------------+-------------+-------------+
| Job Start Date | Occupation  | Industry    |
+----------------+-------------+-------------+
| Not on file    | Not on file | Not on file |
+----------------+-------------+-------------+
 
 
 
+----------------+--------------+------------+
| Travel History | Travel Start | Travel End |
+----------------+--------------+------------+
 
 
 
 
+-------------------------------------+
| No recent travel history available. |
+-------------------------------------+
 documented as of this encounter
 
 Plan of Treatment
 
 
+--------+-----------+------------+----------------------+-------------------+
| Date   | Type      | Specialty  | Care Team            | Description       |
+--------+-----------+------------+----------------------+-------------------+
| / | Office    | Cardiology |   Tonia Wilson,    |                   |
|    | Visit     |            | ARNJESSICA  [address]   |                   |
|        |           |            | St  [place], [place]  |                   |
|        |           |            | [address]  [phone]  |                   |
|        |           |            |  [phone] (Fax)  |                   |
+--------+-----------+------------+----------------------+-------------------+
| / | Hospital  | Radiology  |   Popeye Townsend, |                   |
|    | Encounter |            |  MD  [address] |                   |
|        |           |            |  St. [place],   |                   |
|        |           |            | [place] [address]             |                   |
|        |           |            | [phone]         |                   |
|        |           |            | [phone] (Fax)   |                   |
+--------+-----------+------------+----------------------+-------------------+
| / | Surgery   | Radiology  |   Popeye Townsend, | CV EP PPM SYSTEM  |
|    |           |            |  MD  [address] | IMPLANT           |
|        |           |            |  [place],   |                   |
|        |           |            | [place] [address]             |                   |
|        |           |            | [phone]         |                   |
|        |           |            | [phone] (Fax)   |                   |
+--------+-----------+------------+----------------------+-------------------+
| 02/10/ | Clinical  | Cardiology |                      |                   |
|    | Support   |            |                      |                   |
+--------+-----------+------------+----------------------+-------------------+
| / | Office    | Cardiology |   Tonia Wilson,    |                   |
|    | Visit     |            | BELKIS  [address]   |                   |
|        |           |            | [place]  |                   |
|        |           |            | [address]  [phone]  |                   |
|        |           |            |  [phone] (Fax)  |                   |
+--------+-----------+------------+----------------------+-------------------+
| / | Off-Site  | Nephrology |   Marzena Moser  |                   |
|    | Visit     |            | DO ETIENNE  [address] [place] [address]      |                   |
|        |           |            | [address]      |                   |
|        |           |            | [place]      |                   |
|        |           |            | [address] [phone]  |                   |
|        |           |            |  [phone] (Fax)  |                   |
+--------+-----------+------------+----------------------+-------------------+
 documented as of this encounter
 
 Visit Diagnoses
 Not on filedocumented in this encounter

## 2020-01-08 NOTE — XMS
Encounter Summary
  Created on: 2020
 
 Viviana Ricci
 External Reference #: [id number]
 : 47
 Sex: Female
 
 Demographics
 
 
+-----------------------+----------------------+
| Address               | [address]      |
|                       | [place]  [address] |
+-----------------------+----------------------+
| Home Phone            | [phone]      |
+-----------------------+----------------------+
| Preferred Language    | Unknown              |
+-----------------------+----------------------+
| Marital Status        | Single               |
+-----------------------+----------------------+
| Muslim Affiliation | [id number]                 |
+-----------------------+----------------------+
| Race                  | Unknown              |
+-----------------------+----------------------+
| Ethnic Group          | Unknown              |
+-----------------------+----------------------+
 
 
 Author
 
 
+--------------+--------------------------------------------+
| Author       | [facility]  |
|              | and [place]                                |
+--------------+--------------------------------------------+
| Organization | [facility]  |
|              | and [place]                                |
+--------------+--------------------------------------------+
| Address      | Unknown                                    |
+--------------+--------------------------------------------+
| Phone        | Unavailable                                |
+--------------+--------------------------------------------+
 
 
 
 Support
 
 
+----------------+--------------+---------------------+-----------------+
| Name           | Relationship | Address             | Phone           |
+----------------+--------------+---------------------+-----------------+
| Ada/Ed Radhames | ECON         | [address]              | [phone] |
|                |              | [place]  |                 |
|                |              |  [address]              |                 |
+----------------+--------------+---------------------+-----------------+
 
 
 
 
 Care Team Providers
 
 
+------------------------+------+-----------------+
| Care Team Member Name  | Role | Phone           |
+------------------------+------+-----------------+
| Marzena Moser DO | PCP  | [phone] |
+------------------------+------+-----------------+
 
 
 
 Encounter Details
 
 
+--------+----------+---------------------+----------------------+-------------+
| Date   | Type     | Department          | Care Team            | Description |
+--------+----------+---------------------+----------------------+-------------+
| 10/08/ | Abstract |   [facility] [place]         |   Marzena Moser  |             |
|    |          | NEPHROLOGY  [address]   | DO ETIENNE  [address]      |             |
|        |          | [address]   | [address]      |             |
|        |          | [place], [place]     | [place], [place]      |             |
|        |          | [address]          | [address]  [phone]  |             |
|        |          | [phone]        |  [phone] (Fax)  |             |
+--------+----------+---------------------+----------------------+-------------+
 
 
 
 Social History
 
 
+--------------+-------+-----------+--------+------+
| Tobacco Use  | Types | Packs/Day | Years  | Date |
|              |       |           | Used   |      |
+--------------+-------+-----------+--------+------+
| Never Smoker |       |           |        |      |
+--------------+-------+-----------+--------+------+
 
 
 
+---------------------+---+---+---+
| Smokeless Tobacco:  |   |   |   |
| Never Used          |   |   |   |
+---------------------+---+---+---+
 
 
 
+---------------------------------------------------------------+
| Comments: some second hand smoke exposure, but fairly minimal |
+---------------------------------------------------------------+
 
 
 
+-------------+-------------+---------+----------+
| Alcohol Use | Drinks/Week | oz/Week | Comments |
+-------------+-------------+---------+----------+
| No          |             |         |          |
+-------------+-------------+---------+----------+
 
 
 
 
+------------------+---------------+
| Sex Assigned at  | Date Recorded |
| Birth            |               |
+------------------+---------------+
| Not on file      |               |
+------------------+---------------+
 
 
 
+----------------+-------------+-------------+
| Job Start Date | Occupation  | Industry    |
+----------------+-------------+-------------+
| Not on file    | Not on file | Not on file |
+----------------+-------------+-------------+
 
 
 
+----------------+--------------+------------+
| Travel History | Travel Start | Travel End |
+----------------+--------------+------------+
 
 
 
+-------------------------------------+
| No recent travel history available. |
+-------------------------------------+
 documented as of this encounter
 
 Plan of Treatment
 
 
+--------+-----------+------------+----------------------+-------------------+
| Date   | Type      | Specialty  | Care Team            | Description       |
+--------+-----------+------------+----------------------+-------------------+
| / | Office    | Cardiology |   Tonia Wilson,    |                   |
|    | Visit     |            | ARNP  [address]   |                   |
|        |           |            | St  [place] [facility] [place]  |                   |
|        |           |            | [address]  [phone]  |                   |
|        |           |            |  [phone] (Fax)  |                   |
+--------+-----------+------------+----------------------+-------------------+
| / | Hospital  | Radiology  |   Popeye Townsend, |                   |
|    | Encounter |            |  MD  [address] |                   |
|        |           |            |  St.  [place],   |                   |
|        |           |            | [place] [address]             |                   |
|        |           |            | [phone]         |                   |
|        |           |            | [phone] (Fax)   |                   |
+--------+-----------+------------+----------------------+-------------------+
| / | Surgery   | Radiology  |   Popeye Towsnend, | CV EP PPM SYSTEM  |
|    |           |            |  MD  [address] | IMPLANT           |
|        |           |            |  St.  [place],   |                   |
|        |           |            | [place] [address]             |                   |
|        |           |            | [phone]         |                   |
|        |           |            | [phone] (Fax)   |                   |
+--------+-----------+------------+----------------------+-------------------+
| 02/10/ | Clinical  | Cardiology |                      |                   |
2020   | Support   |            |                      |                   |
+--------+-----------+------------+----------------------+-------------------+
| / | Office    | Cardiology |   Tonia Wilson,    |                   |
|    | Visit     |            | [facility]  [address]   |                   |
 
|        |           |            |   [place]  |                   |
|        |           |            | [address]  [phone]  |                   |
|        |           |            |  [phone] (Fax)  |                   |
+--------+-----------+------------+----------------------+-------------------+
| / | Off-Site  | Nephrology |   Marzena Moser  |                   |
2020   | Visit     |            | DO ETIENNE  [address]      |                   |
|        |           |            | [address]      |                   |
|        |           |            | [place]      |                   |
|        |           |            | [address]  [phone]  |                   |
|        |           |            |  [phone] (Fax)  |                   |
+--------+-----------+------------+----------------------+-------------------+
 documented as of this encounter
 
 Procedures
 
 
+--------------------+--------+------------+----------------------+----------------------+
| Procedure Name     | Priori | Date/Time  | Associated Diagnosis | Comments             |
|                    | ty     |            |                      |                      |
+--------------------+--------+------------+----------------------+----------------------+
| EXTERNAL LAB:      | Routin | 10/03/2018 |                      |   Results for this   |
| TACROLIMUS LEVEL,  | e      |            |                      | procedure are in the |
| [facility]               |        |            |                      |  results section.    |
+--------------------+--------+------------+----------------------+----------------------+
 documented in this encounter
 
 Results
 External Lab: Tacrolimus Level, [facility] (10/03/2018)
 
+-------------+-------+-----------+------------+--------------+
| Component   | Value | Ref Range | Performed  | Pathologist  |
|             |       |           | At         | Signature    |
+-------------+-------+-----------+------------+--------------+
| Tacrolimus, | 6.9   |           |            |              |
|  [facility],      |       |           |            |              |
| External    |       |           |            |              |
+-------------+-------+-----------+------------+--------------+
 
 
 
+----------+
| Specimen |
+----------+
|          |
+----------+
 documented in this encounter
 
 Visit Diagnoses
 Not on filedocumented in this encounter

## 2020-01-08 NOTE — XMS
Encounter Summary
  Created on: 2020
 
 Viviana Ricci
 External Reference #: [id number]
 : 47
 Sex: Female
 
 Demographics
 
 
+-----------------------+----------------------+
| Address               | [address]      |
|                       | [place]  [address] |
+-----------------------+----------------------+
| Home Phone            | [phone]      |
+-----------------------+----------------------+
| Preferred Language    | Unknown              |
+-----------------------+----------------------+
| Marital Status        | Single               |
+-----------------------+----------------------+
| Mandaeism Affiliation | [id number]                 |
+-----------------------+----------------------+
| Race                  | Unknown              |
+-----------------------+----------------------+
| Ethnic Group          | Unknown              |
+-----------------------+----------------------+
 
 
 Author
 
 
+--------------+--------------------------------------------+
| Author       | [facility]  |
|              | and [facility]                                |
+--------------+--------------------------------------------+
| Organization | [facility]  |
|              | and [facility]                                |
+--------------+--------------------------------------------+
| Address      | Unknown                                    |
+--------------+--------------------------------------------+
| Phone        | Unavailable                                |
+--------------+--------------------------------------------+
 
 
 
 Support
 
 
+----------------+--------------+---------------------+-----------------+
| Name           | Relationship | Address             | Phone           |
+----------------+--------------+---------------------+-----------------+
| Ada/Ed Radhames | ECON         | [address]              | [phone] |
|                |              | [place]  |                 |
|                |              |  [id number]              |                 |
+----------------+--------------+---------------------+-----------------+
 
 
 
 
 Care Team Providers
 
 
+-----------------------+------+-------------+
| Care Team Member Name | Role | Phone       |
+-----------------------+------+-------------+
 PCP  | Unavailable |
+-----------------------+------+-------------+
 
 
 
 Reason for Visit
 
 
+-------------------+----------+
| Reason            | Comments |
+-------------------+----------+
| Medication Refill |          |
+-------------------+----------+
 
 
 
 Encounter Details
 
 
+--------+--------+---------------------+----------------------+-------------------+
| Date   | Type   | Department          | Care Team            | Description       |
+--------+--------+---------------------+----------------------+-------------------+
| / | Refill |   [facility]         |   Marzena Moser  | Medication Refill |
|    |        | NEPHROLOGY  [address]   | M, DO  [address]      |                   |
|        |        | [address]   | [address]      |                   |
|        |        | [place], [place]     | [place], [place]      |                   |
|        |        | [address]          | [address]  [phone]  |                   |
|        |        | [phone]        |  [phone] (Fax)  |                   |
+--------+--------+---------------------+----------------------+-------------------+
 
 
 
 Social History
 
 
+--------------+-------+-----------+--------+------+
| Tobacco Use  | Types | Packs/Day | Years  | Date |
|              |       |           | Used   |      |
+--------------+-------+-----------+--------+------+
| Never Smoker |       |           |        |      |
+--------------+-------+-----------+--------+------+
 
 
 
+---------------------+---+---+---+
| Smokeless Tobacco:  |   |   |   |
| Never Used          |   |   |   |
+---------------------+---+---+---+
 
 
 
+---------------------------------------------------------------+
| Comments: some second hand smoke exposure, but fairly minimal |
 
+---------------------------------------------------------------+
 
 
 
+-------------+-------------+---------+----------+
| Alcohol Use | Drinks/Week | oz/Week | Comments |
+-------------+-------------+---------+----------+
| No          |             |         |          |
+-------------+-------------+---------+----------+
 
 
 
+------------------+---------------+
| Sex Assigned at  | Date Recorded |
| Birth            |               |
+------------------+---------------+
| Not on file      |               |
+------------------+---------------+
 
 
 
+----------------+-------------+-------------+
| Job Start Date | Occupation  | Industry    |
+----------------+-------------+-------------+
| Not on file    | Not on file | Not on file |
+----------------+-------------+-------------+
 
 
 
+----------------+--------------+------------+
| Travel History | Travel Start | Travel End |
+----------------+--------------+------------+
 
 
 
+-------------------------------------+
| No recent travel history available. |
+-------------------------------------+
 documented as of this encounter
 
 Plan of Treatment
 
 
+--------+-----------+------------+----------------------+-------------------+
| Date   | Type      | Specialty  | Care Team            | Description       |
+--------+-----------+------------+----------------------+-------------------+
| / | Office    | Cardiology |   HellalfredooTnia,    |                   |
|    | Visit     |            | ARNP  [address]   |                   |
|        |           |            | St  [place], [place]  |                   |
|        |           |            | [address]  [phone]  |                   |
|        |           |            |  [phone] (Fax)  |                   |
+--------+-----------+------------+----------------------+-------------------+
| / | Hospital  | Radiology  |   Popeye Townsend, |                   |
|    | Encounter |            |  MD  [address] |                   |
|        |           |            |  St.  [place],   |                   |
|        |           |            | [place] [address]             |                   |
|        |           |            | [phone]         |                   |
|        |           |            | [phone] (Fax)   |                   |
+--------+-----------+------------+----------------------+-------------------+
| / | Surgery   | Radiology  |   Popeye Townsend, | CV EP PPM SYSTEM  |
 
|    |           |            |  MD  [address] | IMPLANT           |
|        |           |            |  St.  [place],   |                   |
|        |           |            | [place] [address]             |                   |
|        |           |            | [phone]         |                   |
|        |           |            | [phone] (Fax)   |                   |
+--------+-----------+------------+----------------------+-------------------+
| 02/10/ | Clinical  | Cardiology |                      |                   |
|    | Support   |            |                      |                   |
+--------+-----------+------------+----------------------+-------------------+
| / | Office    | Cardiology |   Tonia Wilson,    |                   |
|    | Visit     |            | ARNP  [address]   |                   |
|        |           |            | [place]  |                   |
|        |           |            | [address]  [phone]  |                   |
|        |           |            |  [phone] (Fax)  |                   |
+--------+-----------+------------+----------------------+-------------------+
| / | Off-Site  | Nephrology |   Marzena Moser  |                   |
|    | Visit     |            | DO ETIENNE  [address] [place] [address]      |                   |
|        |           |            | [address]      |                   |
|        |           |            | [place]      |                   |
|        |           |            | [address]  [phone]  |                   |
|        |           |            |  [phone] (Fax)  |                   |
+--------+-----------+------------+----------------------+-------------------+
 documented as of this encounter
 
 Visit Diagnoses
 Not on filedocumented in this encounter

## 2020-01-08 NOTE — XMS
Encounter Summary
  Created on: 2020
 
 Viviana Ricci
 External Reference #: [id number]
 : 47
 Sex: Female
 
 Demographics
 
 
+-----------------------+----------------------+
| Address               | [address]      |
|                       | [place]  [address] |
+-----------------------+----------------------+
| Home Phone            | [phone]      |
+-----------------------+----------------------+
| Preferred Language    | Unknown              |
+-----------------------+----------------------+
| Marital Status        | Single               |
+-----------------------+----------------------+
| [employer]iliation | [id number]                 |
+-----------------------+----------------------+
| Race                  | Unknown              |
+-----------------------+----------------------+
| Ethnic Group          | Unknown              |
+-----------------------+----------------------+
 
 
 Author
 
 
+--------------+--------------------------------------------+
| Author       | [facility]  |
|              | and [facility]                                |
+--------------+--------------------------------------------+
| Organization | [facility]  |
|              | and [facility]                                |
+--------------+--------------------------------------------+
| Address      | Unknown                                    |
+--------------+--------------------------------------------+
| Phone        | Unavailable                                |
+--------------+--------------------------------------------+
 
 
 
 Support
 
 
+----------------+--------------+---------------------+-----------------+
| Name           | Relationship | Address             | Phone           |
+----------------+--------------+---------------------+-----------------+
| Ada/Ed Radhames | ECON         | [address]              | [phone] |
|                |              | [place], [place]  |                 |
|                |              |  [id number]              |                 |
+----------------+--------------+---------------------+-----------------+
 
 
 
 
 Care Team Providers
 
 
+-----------------------+------+-------------+
| Care Team Member Name | Role | Phone       |
+-----------------------+------+-------------+
 PCP  | Unavailable |
+-----------------------+------+-------------+
 
 
 
 Reason for Referral
 Diagnostic/Screening (Routine)
 
+--------+--------+-----------+--------------+--------------+---------------+
| Status | Reason | Specialty | Diagnoses /  | Referred By  | Referred To   |
|        |        |           | Procedures   | Contact      | Contact       |
+--------+--------+-----------+--------------+--------------+---------------+
| Closed |        | Radiology |   Diagnoses  |   [place],   |   [facility]     |
|        |        |           |  Chronic     | Edgar GARCIA MD   | [address]  |
|        |        |           | right        | [address] |  [place], |
|        |        |           | shoulder     |   [place]      |  [place]           |
|        |        |           | pain         | [place]    | [address]    |
|        |        |           | Procedures   | [address]        | Phone:        |
|        |        |           | MRI Shoulder | Phone:       | [phone]  |
|        |        |           |  Right wo    | [phone] |  Fax:         |
|        |        |           | Contrast     |   Fax:       | [phone]  |
|        |        |           |              | [phone] |               |
+--------+--------+-----------+--------------+--------------+---------------+
 
 
 
 
 Reason for Visit
 Diagnostic/Screening (Routine)
 
+--------+--------+-----------+--------------+--------------+---------------+
| Status | Reason | Specialty | Diagnoses /  | Referred By  | Referred To   |
|        |        |           | Procedures   | Contact      | Contact       |
+--------+--------+-----------+--------------+--------------+---------------+
| Closed |        | Radiology |   Diagnoses  |   [place],   |   [facility]     |
|        |        |           |  Chronic     | Edgar GARCIA MD   | [address]  |
|        |        |           | right        | [address] |  [place], |
|        |        |           | shoulder     |   [place]      |  [place]           |
|        |        |           | pain         | [place]    | [address]    |
|        |        |           | Procedures   | [address]        | Phone:        |
|        |        |           | MRI Shoulder | Phone:       | [phone]  |
|        |        |           |  Right wo    | [phone] |  Fax:         |
|        |        |           | Contrast     |   Fax:       | [phone]  |
|        |        |           |              | [phone] |               |
+--------+--------+-----------+--------------+--------------+---------------+
 
 
 
 
 Encounter Details
 
 
+--------+-----------+----------------------+----------------------+----------------+
 
| Date   | Type      | Department           | Care Team            | Description    |
+--------+-----------+----------------------+----------------------+----------------+
| 03/10/ | Hospital  |   [facility] |   Edgar Sanders,   | Chronic right  |
| 2017   | Encounter |  [facility] MRI  [address]  | [facility]     | shoulder pain  |
|        |           | [address]  [place], | [place]      |                |
|        |           |  [place] [address]       | [address]  [phone]  |                |
|        |           | [phone]         |  [phone] (Fax)  |                |
+--------+-----------+----------------------+----------------------+----------------+
 
 
 
 Social History
 
 
+--------------+-------+-----------+--------+------+
| Tobacco Use  | Types | Packs/Day | Years  | Date |
|              |       |           | Used   |      |
+--------------+-------+-----------+--------+------+
| Never Smoker |       |           |        |      |
+--------------+-------+-----------+--------+------+
 
 
 
+---------------------+---+---+---+
| Smokeless Tobacco:  |   |   |   |
| Never Used          |   |   |   |
+---------------------+---+---+---+
 
 
 
+---------------------------------------------------------------+
| Comments: some second hand smoke exposure, but fairly minimal |
+---------------------------------------------------------------+
 
 
 
+-------------+-------------+---------+----------+
| Alcohol Use | Drinks/Week | oz/Week | Comments |
+-------------+-------------+---------+----------+
| No          |             |         |          |
+-------------+-------------+---------+----------+
 
 
 
+------------------+---------------+
| Sex Assigned at  | Date Recorded |
| Birth            |               |
+------------------+---------------+
| Not on file      |               |
+------------------+---------------+
 
 
 
+----------------+-------------+-------------+
| Job Start Date | Occupation  | Industry    |
+----------------+-------------+-------------+
| Not on file    | Not on file | Not on file |
+----------------+-------------+-------------+
 
 
 
 
+----------------+--------------+------------+
| Travel History | Travel Start | Travel End |
+----------------+--------------+------------+
 
 
 
+-------------------------------------+
| No recent travel history available. |
+-------------------------------------+
 documented as of this encounter
 
 Medications at Time of Discharge
 
 
+----------------------+----------------------+-----------+---------+----------+-----------+
| Medication           | Sig                  | Dispensed | Refills | Start    | End Date  |
|                      |                      |           |         | Date     |           |
+----------------------+----------------------+-----------+---------+----------+-----------+
|   aspirin 81 mg EC   | Take 81 mg by mouth  |           | 0       |          |           |
| tablet               | Daily.               |           |         |          |           |
+----------------------+----------------------+-----------+---------+----------+-----------+
|   cholecalciferol    | Take 2,000 Units by  |           | 0       |          |           |
| (VITAMIN D-3) 2000   | mouth Every other    |           |         |          |           |
| UNITS                | day.                 |           |         |          |           |
| TABSIndications:     |                      |           |         |          |           |
| Unspecified          |                      |           |         |          |           |
| hypertensive kidney  |                      |           |         |          |           |
| disease with chronic |                      |           |         |          |           |
|  kidney disease      |                      |           |         |          |           |
| stage I through      |                      |           |         |          |           |
| stage IV, or         |                      |           |         |          |           |
| unspecified(403.90), |                      |           |         |          |           |
|  FSGS (focal         |                      |           |         |          |           |
| segmental            |                      |           |         |          |           |
| glomerulosclerosis), |                      |           |         |          |           |
|  Hyperlipidemia,     |                      |           |         |          |           |
| Complications of     |                      |           |         |          |           |
| transplanted kidney  |                      |           |         |          |           |
+----------------------+----------------------+-----------+---------+----------+-----------+
|   glucose blood VI   | Check blood sugar    |   100     | 12      | 20 |           |
| test strips (ONE     | before each meal and | each      |         | 12       |           |
| TOUCH ULTRA TEST)    |  as directed         |           |         |          |           |
| stripIndications:    |                      |           |         |          |           |
| Unspecified          |                      |           |         |          |           |
| hypertensive kidney  |                      |           |         |          |           |
| disease with chronic |                      |           |         |          |           |
|  kidney disease      |                      |           |         |          |           |
| stage I through      |                      |           |         |          |           |
| stage IV, or         |                      |           |         |          |           |
| unspecified(403.90), |                      |           |         |          |           |
|  Complications of    |                      |           |         |          |           |
| transplanted kidney, |                      |           |         |          |           |
|  Diabetes mellitus   |                      |           |         |          |           |
| type II,             |                      |           |         |          |           |
| uncontrolled (HCC),  |                      |           |         |          |           |
| Hyperlipidemia       |                      |           |         |          |           |
+----------------------+----------------------+-----------+---------+----------+-----------+
|   loperamide         | Take 1 capsule by    |   60      | 5       | 20 |           |
| (ANTI-DIARRHEAL) 2   | mouth 4 times daily  | capsule   |         | 14       |           |
 
| mg                   | as needed.           |           |         |          |           |
| capsuleIndications:  |                      |           |         |          |           |
| Unspecified          |                      |           |         |          |           |
| hypertensive kidney  |                      |           |         |          |           |
| disease with chronic |                      |           |         |          |           |
|  kidney disease      |                      |           |         |          |           |
| stage I through      |                      |           |         |          |           |
| stage IV, or         |                      |           |         |          |           |
| unspecified(403.90), |                      |           |         |          |           |
|  FSGS (focal         |                      |           |         |          |           |
| segmental            |                      |           |         |          |           |
| glomerulosclerosis), |                      |           |         |          |           |
|  Hypothyroidism,     |                      |           |         |          |           |
| Hyperlipidemia       |                      |           |         |          |           |
+----------------------+----------------------+-----------+---------+----------+-----------+
|   Respiratory        | Decrease CPAP to     |   1 each  | 0       | 20 |           |
| Therapy Supplies     | 12-18 cmH2O          |           |         | 13       |           |
| MISC                 | Diagnosis            |           |         |          |           |
|                      | Code(s)327.23.       |           |         |          |           |
|                      | Please send order to |           |         |          |           |
|                      |  [facility].     |           |         |          |           |
+----------------------+----------------------+-----------+---------+----------+-----------+
|   allopurinol        | Take 1 tablet by     |   30      | 11      | 20 |  |
| (ZYLOPRIM) 100 mg    | mouth Daily.         | tablet    |         | 17       | 8         |
| tablet               |                      |           |         |          |           |
+----------------------+----------------------+-----------+---------+----------+-----------+
|   cinacalcet         | Take 1 tablet by     |   30      | 11      | 20 |  |
| (SENSIPAR) 30 mg     | mouth Daily.         | tablet    |         | 17       | 8         |
| tablet               |                      |           |         |          |           |
+----------------------+----------------------+-----------+---------+----------+-----------+
|   fludrocortisone    | Take 1 tablet by     |   90      | 4       | 20 |  |
| (FLORINEF) 0.1 mg    | mouth Every other    | tablet    |         | 17       | 8         |
| tabletIndications:   | day.                 |           |         |          |           |
| Kidney replaced by   |                      |           |         |          |           |
| transplant,          |                      |           |         |          |           |
| Hypertension,        |                      |           |         |          |           |
| essential, Type 2 DM |                      |           |         |          |           |
|  with CKD stage 2    |                      |           |         |          |           |
| and hypertension     |                      |           |         |          |           |
| ([facility]), Coronary      |                      |           |         |          |           |
| artery disease       |                      |           |         |          |           |
| involving native     |                      |           |         |          |           |
| coronary artery of   |                      |           |         |          |           |
| native heart without |                      |           |         |          |           |
|  angina pectoris     |                      |           |         |          |           |
+----------------------+----------------------+-----------+---------+----------+-----------+
|   furosemide (LASIX) | Take 1 tablet by     |   30      | 5       | 20 |  |
|  40 mg               | mouth Daily as       | tablet    |         | 15       | 8         |
| tabletIndications:   | needed.              |           |         |          |           |
| Unspecified          |                      |           |         |          |           |
| hypertensive kidney  |                      |           |         |          |           |
| disease with chronic |                      |           |         |          |           |
|  kidney disease      |                      |           |         |          |           |
| stage I through      |                      |           |         |          |           |
| stage IV, or         |                      |           |         |          |           |
| unspecified(403.90), |                      |           |         |          |           |
|  FSGS (focal         |                      |           |         |          |           |
| segmental            |                      |           |         |          |           |
| glomerulosclerosis), |                      |           |         |          |           |
|  Hyperlipidemia      |                      |           |         |          |           |
 
+----------------------+----------------------+-----------+---------+----------+-----------+
|   insulin glargine   | Inject 20 Units      |   10 mL   | 11      | 20 | 10/27/201 |
| (LANTUS) 100         | under the skin every |           |         | 16       | 7         |
| units/mL injection   |  morning.            |           |         |          |           |
| (vial)Indications:   |                      |           |         |          |           |
| Type 2 diabetes      |                      |           |         |          |           |
| mellitus with ESRD   |                      |           |         |          |           |
| (end-stage renal     |                      |           |         |          |           |
| disease) (HCC),      |                      |           |         |          |           |
| Complication of      |                      |           |         |          |           |
| transplanted kidney, |                      |           |         |          |           |
|  unspecified         |                      |           |         |          |           |
| complication,        |                      |           |         |          |           |
| Diabetes mellitus    |                      |           |         |          |           |
| type II,             |                      |           |         |          |           |
| uncontrolled (HCC),  |                      |           |         |          |           |
| Hyperlipidemia,      |                      |           |         |          |           |
| unspecified          |                      |           |         |          |           |
| hyperlipidemia type  |                      |           |         |          |           |
+----------------------+----------------------+-----------+---------+----------+-----------+
|   insulin lispro     | Inject               |   10 vial | 11      | 20 |  |
| (HUMALOG) 100        | subcutaneously       |           |         | 16       | 7         |
| units/mL injection   | before meals         |           |         |          |           |
| (vial)               | according to sliding |           |         |          |           |
|                      |  scale               |           |         |          |           |
+----------------------+----------------------+-----------+---------+----------+-----------+
|   Insulin            | Use before meals and |   100     | 11      | 20 |  |
| Syringe-Needle U-100 |  as directed.        | each      |         | 16       | 7         |
|  (BD INSULIN SYRINGE |                      |           |         |          |           |
|  ULTRAFINE) 31G X    |                      |           |         |          |           |
| 5/16" 0.5 ML         |                      |           |         |          |           |
| MISCIndications:     |                      |           |         |          |           |
| Type II diabetes     |                      |           |         |          |           |
| mellitus,            |                      |           |         |          |           |
| uncontrolled (HCC),  |                      |           |         |          |           |
| Insulin dependent    |                      |           |         |          |           |
| type 2 diabetes      |                      |           |         |          |           |
| mellitus,            |                      |           |         |          |           |
| uncontrolled (HCC)   |                      |           |         |          |           |
+----------------------+----------------------+-----------+---------+----------+-----------+
|   levothyroxine      | Take 1 tablet by     |   30      | 11      | 20 |  |
| (LEVOTHROID) 50 mcg  | mouth Daily.         | tablet    |         | 16       | 7         |
| tablet               |                      |           |         |          |           |
+----------------------+----------------------+-----------+---------+----------+-----------+
|   lisinopril         | Take 1 tablet by     |   90      | 3       | 20 |  |
| (PRINIVIL,ZESTRIL)   | mouth Daily.         | tablet    |         | 17       | 7         |
| 30 MG tablet         |                      |           |         |          |           |
+----------------------+----------------------+-----------+---------+----------+-----------+
|   losartan (COZAAR)  | Take 1 tablet by     |   90      | 3       | 20 |  |
| 50 mg                | mouth Daily.         | tablet    |         | 17       | 8         |
| tabletIndications:   |                      |           |         |          |           |
| Essential            |                      |           |         |          |           |
| hypertension with    |                      |           |         |          |           |
| goal blood pressure  |                      |           |         |          |           |
| less than 130/80,    |                      |           |         |          |           |
| Kidney replaced by   |                      |           |         |          |           |
| transplant, Other    |                      |           |         |          |           |
| specified            |                      |           |         |          |           |
| hypothyroidism, Type |                      |           |         |          |           |
|  2 DM with CKD stage |                      |           |         |          |           |
 
|  2 and hypertension  |                      |           |         |          |           |
| (HCC)                |                      |           |         |          |           |
+----------------------+----------------------+-----------+---------+----------+-----------+
|   magnesium oxide    | Take 1 tablet by     |   60      | 11      | 20 |  |
| (MAG-OX) 400 mg      | mouth 2 times daily. | tablet    |         | 16       | 7         |
| tablet               |                      |           |         |          |           |
+----------------------+----------------------+-----------+---------+----------+-----------+
|   metoprolol         | Take 1 tablet by     |   60      | 11      | 20 |  |
| tartrate (LOPRESSOR) | mouth 2 times daily. | tablet    |         | 16       | 7         |
|  25 mg tablet        |                      |           |         |          |           |
+----------------------+----------------------+-----------+---------+----------+-----------+
|   mycophenolate      | TAKE (1) CAPSULE BY  |   90      | 11      | 20 | 10/27/201 |
| (CELLCEPT) 250 mg    | MOUTH THREE TIMES    | capsule   |         | 16       | 7         |
| capsuleIndications:  | DAILY.               |           |         |          |           |
| Kidney replaced by   |                      |           |         |          |           |
| transplant           |                      |           |         |          |           |
+----------------------+----------------------+-----------+---------+----------+-----------+
|   omeprazole         | Take one capsule by  |   90      | 4       | 20 |  |
| (PRILOSEC) 20 mg     | mouth once daily on  | capsule   |         | 16       | 7         |
| capsule              | an empty stomach     |           |         |          |           |
+----------------------+----------------------+-----------+---------+----------+-----------+
|   predniSONE         | Take 1 tablet by     |   90      | 3       | 20 |  |
| (DELTASONE) 5 mg     | mouth Daily.         | tablet    |         | 17       | 7         |
| tablet               |                      |           |         |          |           |
+----------------------+----------------------+-----------+---------+----------+-----------+
|   Prenatal           | Take 0.8 mg by mouth |   30 each | 11      | 10/10/20 | 10/25/201 |
| Multivit-Min-Fe-FA   |  Daily.              |           |         | 16       | 7         |
| (PRENATAL VITAMINS)  |                      |           |         |          |           |
| 0.8 MG TABS          |                      |           |         |          |           |
+----------------------+----------------------+-----------+---------+----------+-----------+
|   rosuvastatin       | Take 1 tablet by     |   30      | 11      | 20 |  |
| (CRESTOR) 20 mg      | mouth nightly.       | tablet    |         | 16       | 7         |
| tablet               |                      |           |         |          |           |
+----------------------+----------------------+-----------+---------+----------+-----------+
|   tacrolimus         | TAKE (1) CAPSULE     |   60      | 11      | 20 |  |
| (PROGRAF) 0.5 mg     | TWICE DAILY WITH 1MG | capsule   |         | 16       | 8         |
| capsuleIndications:  |  CAPSULE (TOTAL DOSE |           |         |          |           |
| Kidney replaced by   |  = 1.5MG TWICE       |           |         |          |           |
| transplant           | DAILY).              |           |         |          |           |
+----------------------+----------------------+-----------+---------+----------+-----------+
|   tacrolimus         | TAKE (1) CAPSULE     |   60      | 11      | 20 |  |
| (PROGRAF) 1 mg       | TWICE DAILY WITH     | capsule   |         | 16       | 8         |
| capsuleIndications:  | 0.5MG CAPSULE (TOTAL |           |         |          |           |
| Kidney replaced by   |  DOSE = 1.5MG TWICE  |           |         |          |           |
| transplant           | DAILY)               |           |         |          |           |
+----------------------+----------------------+-----------+---------+----------+-----------+
 documented as of this encounter
 
 Plan of Treatment
 
 
+--------+-----------+------------+----------------------+-------------------+
| Date   | Type      | Specialty  | Care Team            | Description       |
+--------+-----------+------------+----------------------+-------------------+
| / | Office    | Cardiology |   Tonia Wilson,    |                   |
|    | Visit     |            | ARNP  [address]   |                   |
|        |           |            | St  [place], [place]  |                   |
|        |           |            | [address]  [phone]  |                   |
|        |           |            |  [phone] (Fax)  |                   |
+--------+-----------+------------+----------------------+-------------------+
 
| / | Hospital  | Radiology  |   Popeye Townsend, |                   |
|    | Encounter |            |  MD  [address] |                   |
|        |           |            |  St.  [place],   |                   |
|        |           |            | [place] [address]             |                   |
|        |           |            | [phone]         |                   |
|        |           |            | [phone] (Fax)   |                   |
+--------+-----------+------------+----------------------+-------------------+
| / | Surgery   | Radiology  |   Popeye Townsend, | CV EP PPM SYSTEM  |
|    |           |            |  MD  [address] | IMPLANT           |
|        |           |            |  St.  [place],   |                   |
|        |           |            | [place] [address]             |                   |
|        |           |            | [phone]         |                   |
|        |           |            | [phone] (Fax)   |                   |
+--------+-----------+------------+----------------------+-------------------+
| 02/10/ | Clinical  | Cardiology |                      |                   |
|    | Support   |            |                      |                   |
+--------+-----------+------------+----------------------+-------------------+
| / | Office    | Cardiology |   Tonia Wilson,    |                   |
|    | Visit     |            | ARNP  [address]   |                   |
|        |           |            | [place]  |                   |
|        |           |            | [address]  [phone]  |                   |
|        |           |            |  [phone] (Fax)  |                   |
+--------+-----------+------------+----------------------+-------------------+
| / | Off-Site  | Nephrology |   Marzena Moser  |                   |
|    | Visit     |            | M, DO  [address]      |                   |
|        |           |            | [address]      |                   |
|        |           |            | [place]      |                   |
|        |           |            | [address]  [phone]  |                   |
|        |           |            |  [phone] (Fax)  |                   |
+--------+-----------+------------+----------------------+-------------------+
 documented as of this encounter
 
 Procedures
 
 
+---------------------+--------+-------------+----------------------+----------------------+
| Procedure Name      | Priori | Date/Time   | Associated Diagnosis | Comments             |
|                     | ty     |             |                      |                      |
+---------------------+--------+-------------+----------------------+----------------------+
| MRI SHOULDER RIGHT  | Routin | 03/10/2017  |   Chronic right      |   Results for this   |
| WO CONTRAST         | e      |  1:11 PM    | shoulder pain        | procedure are in the |
|                     |        | PST         |                      |  results section.    |
+---------------------+--------+-------------+----------------------+----------------------+
 documented in this encounter
 
 Results
 MRI Shoulder Right wo Contrast (03/10/2017  1:11 PM PST)
 
+----------+
| Specimen |
+----------+
|          |
+----------+
 
 
 
+------------------------------------------------------------------------+-----------------+
| Narrative                                                              | Performed At    |
+------------------------------------------------------------------------+-----------------+
|   EXAM: MRI SHOULDER RIGHT WO CONTRAST dated 3/10/2017 12:36 PM        |   [facility]    |
 
| HISTORY:   right shoulder pain- evaluate for rotator cuff tear         | [facility]        |
| COMPARISON: Outside radiographs dated 2016.     TECHNIQUE: | [facility]  |
|  Multiplanar multisequence MR imaging of the right shoulder without    | - IMAGING       |
| contrast. Imaging is performed on a 3 [facility] MRI.     FINDINGS:         |                 |
| Rotator Cuff:     There is a near complete tear of the rotator cuff.   |                 |
|   There is a complete  full-thickness tear involving the supraspinatus |                 |
|  tendon.   The fibers are  retracted deep to the acromion.   There are |                 |
|  very heterogeneous, thickened,  irregular.   A significant portion of |                 |
|  the infraspinatus tendon appears similarly  torn and retracted.       |                 |
|   There is a component of the posterior tendon which is  thickened,    |                 |
| irregular, but attached at the humeral insertion.   There is a intact  |                 |
|  teres minor tendon.   There is tendinosis and low to moderate grade   |                 |
| tearing in  the subscapularis tendon.   There is severe atrophy in the |                 |
|  supraspinatus muscle.   There is mild to moderate atrophy in the      |                 |
| infraspinatus muscle.     Labrum and biceps tendon:     Diffuse        |                 |
| degenerative irregularity throughout the remnant portions of the       |                 |
| labrum.   The biceps labral anchor is intact.   There is thickening    |                 |
| and increased signal  throughout the intra-articular and               |                 |
| extra-articular components of the long head  of the biceps tendon.     |                 |
|   The biceps tendon is in place and the biceps groove.                 |                 |
| Glenohumeral and acromioclavicular joints:     Diffuse moderate        |                 |
| degenerative changes throughout the glenohumeral joint.   Moderate     |                 |
| degenerative changes throughout the acromioclavicular joint.   Fluid   |                 |
| in  the subacromial/subdeltoid bursa appears to communicate with the   |                 |
| acromioclavicular joint consistent with capsular disruption.   The     |                 |
| acromion is a  type II.   There is subacromial enthesopathy.           |                 |
| Other:     There is a moderate-sized joint effusion.     IMPRESSION -  |                 |
|     There is a complete tear of the supraspinatus tendon and near      |                 |
| complete of the  infraspinatus tendon.   This is associated with       |                 |
| severe atrophy of the  supraspinatus muscle and mild to moderate       |                 |
| atrophy of the infraspinatus muscle.     Tendinosis and low to         |                 |
| moderate grade tearing of the subscapularis tendon.     Tendinosis     |                 |
| throughout the long head of the biceps tendon.     Moderate            |                 |
| degenerative changes in the glenohumeral joint.     Moderate           |                 |
| degenerative changes in the acromioclavicular joint.   Prominent       |                 |
| subacromial enthesopathy.     Dictated and Signed by: Brooks Zhang, |                 |
|  MD    Electronically signed: 3/10/2017 3:17 PM                        |                 |
+------------------------------------------------------------------------+-----------------+
 
 
 
+------------------------------------------------------------------------------------------+
| Procedure Note                                                                           |
+------------------------------------------------------------------------------------------+
|   Ralf, Rad Results In - 03/10/2017  3:20 PM PST  EXAM: MRI SHOULDER RIGHT WO CONTRAST    |
| dated 3/10/2017 12:36 PMHISTORY:  right shoulder pain- evaluate for rotator cuff         |
| tearCOMPARISON: Outside radiographs dated 2016.TECHNIQUE: Multiplanar        |
| multisequence MR imaging of the right shoulder withoutcontrast. Imaging is performed on  |
| a 3 [facility] MRI.FINDINGS: Rotator Cuff:There is a near complete tear of the rotator cuff.  |
|  There is a completefull-thickness tear involving the supraspinatus tendon.  The fibers  |
| areretracted deep to the acromion.  There are very heterogeneous, thickened,irregular.   |
| A significant portion of the infraspinatus tendon appears similarlytorn and retracted.   |
| There is a component of the posterior tendon which isthickened, irregular, but attached  |
| at the humeral insertion.  There is a intactteres minor tendon.  There is tendinosis and |
|  low to moderate grade tearing inthe subscapularis tendon.  There is severe atrophy in   |
| the supraspinatus muscle. There is mild to moderate atrophy in the infraspinatus         |
| muscle.Labrum and biceps tendon:Diffuse degenerative irregularity throughout the remnant |
|  portions of the labrum. The biceps labral anchor is intact.  There is thickening and    |
| increased signalthroughout the intra-articular and extra-articular components of the     |
| long headof the biceps tendon.  The biceps tendon is in place and the biceps groove.     |
 
| Glenohumeral and acromioclavicular joints:Diffuse moderate degenerative changes          |
| throughout the glenohumeral joint. Moderate degenerative changes throughout the          |
| acromioclavicular joint.  Fluid inthe subacromial/subdeltoid bursa appears to            |
| communicate with theacromioclavicular joint consistent with capsular disruption.  The    |
| acromion is atype II.  There is subacromial enthesopathy.Other:There is a moderate-sized |
|  joint effusion.IMPRESSION -There is a complete tear of the supraspinatus tendon and     |
| near complete of theinfraspinatus tendon.  This is associated with severe atrophy of     |
| thesupraspinatus muscle and mild to moderate atrophy of the infraspinatus                |
| muscle.Tendinosis and low to moderate grade tearing of the subscapularis                 |
| tendon.Tendinosis throughout the long head of the biceps tendon.Moderate degenerative    |
| changes in the glenohumeral joint.Moderate degenerative changes in the acromioclavicular |
|  joint.  Prominentsubacromial enthesopathy.Dictated and Signed by: Brooks Zhang MD   |
| Electronically signed: 3/10/2017 3:17 PM                                                 |
|                                                                                          |
|Glenohumeral and acromioclavicular joints:                                                |
|                                                                                          |
|Diffuse moderate degenerative changes throughout the glenohumeral joint.                  |
|Moderate degenerative changes throughout the acromioclavicular joint.  Fluid in           |
|the subacromial/subdeltoid bursa appears to communicate with the                          |
|acromioclavicular joint consistent with capsular disruption.  The acromion is a           |
|type II.  There is subacromial enthesopathy.                                              |
|                                                                                          |
|Other:                                                                                   |
|                                                                                          |
|There is a moderate-sized joint effusion.                                                 |
|                                                                                          |
|IMPRESSION -                                                                              |
|                                                                                          |
|There is a complete tear of the supraspinatus tendon and near complete of the             |
|infraspinatus tendon.  This is associated with severe atrophy of the                      |
|supraspinatus muscle and mild to moderate atrophy of the infraspinatus muscle.            |
|                                                                                          |
|Tendinosis and low to moderate grade tearing of the subscapularis tendon.                 |
|                                                                                          |
|Tendinosis throughout the long head of the biceps tendon.                                 |
|                                                                                          |
|Moderate degenerative changes in the glenohumeral joint.                                  |
|                                                                                          |
|Moderate degenerative changes in the acromioclavicular joint.  Prominent                  |
|subacromial enthesopathy.                                                                 |
|                                                                                          |
|Dictated and Signed by: Brooks Zhang MD                                               |
| Electronically signed: 3/10/2017 3:17 PM                                                 |
+------------------------------------------------------------------------------------------+
 
 
 
+----------------------+---------------------+--------------------+----------------+
| Performing           | Address             | City/State/Zipcode | Phone Number   |
| Organization         |                     |                    |                |
+----------------------+---------------------+--------------------+----------------+
|   [facility]     |   [address] | [place]    |   [phone] |
| [facility]  |                     | [id number]              |                |
| - IMAGING            |                     |                    |                |
+----------------------+---------------------+--------------------+----------------+
 documented in this encounter
 
 Visit Diagnoses
 
 
 
+---------------------------------------------------------------+
| Diagnosis                                                     |
+---------------------------------------------------------------+
|   Chronic right shoulder pain  Pain in joint, shoulder region |
+---------------------------------------------------------------+
 documented in this encounter

## 2020-01-08 NOTE — XMS
Encounter Summary
  Created on: 2020
 
 Viviana Ricci
 External Reference #: [id number]
 : 47
 Sex: Female
 
 Demographics
 
 
+-----------------------+----------------------+
| Address               | [address]      |
|                       | [place]  [address] |
+-----------------------+----------------------+
| Home Phone            | [phone]      |
+-----------------------+----------------------+
| Preferred Language    | Unknown              |
+-----------------------+----------------------+
| Marital Status        | Single               |
+-----------------------+----------------------+
| [employer] Affiliation | 1009                 |
+-----------------------+----------------------+
| Race                  | Unknown              |
+-----------------------+----------------------+
| Ethnic Group          | Unknown              |
+-----------------------+----------------------+
 
 
 Author
 
 
+--------------+--------------------------------------------+
| Author       | [facility]  |
|              | and [place]                                |
+--------------+--------------------------------------------+
| Organization | [facility]  |
|              | and [place]                                |
+--------------+--------------------------------------------+
| Address      | Unknown                                    |
+--------------+--------------------------------------------+
| Phone        | Unavailable                                |
+--------------+--------------------------------------------+
 
 
 
 Support
 
 
+----------------+--------------+---------------------+-----------------+
| Name           | Relationship | Address             | Phone           |
+----------------+--------------+---------------------+-----------------+
| Ada/Ed Radhames | ECON         | [address]              | [phone] |
|                |              | [place]  |                 |
|                |              |  [address]              |                 |
+----------------+--------------+---------------------+-----------------+
 
 
 
 
 Care Team Providers
 
 
+------------------------+------+-----------------+
| Care Team Member Name  | Role | Phone           |
+------------------------+------+-----------------+
| Marzena Moser DO | PCP  | [phone] |
+------------------------+------+-----------------+
 
 
 
 Encounter Details
 
 
+--------+----------+---------------------+----------------------+-------------+
| Date   | Type     | Department          | Care Team            | Description |
+--------+----------+---------------------+----------------------+-------------+
| / | Abstract |   [facility]         |   Marzena Moser  |             |
|    |          | NEPHROLOGY  [address]   | DO ETIENNE  [address]      |             |
|        |          | [address]   | [address]      |             |
|        |          | [place], [place]     | [place], [place]      |             |
|        |          | [address]          | [address]  [phone]  |             |
|        |          | [phone]        |  [phone] (Fax)  |             |
+--------+----------+---------------------+----------------------+-------------+
 
 
 
 Social History
 
 
+--------------+-------+-----------+--------+------+
| Tobacco Use  | Types | Packs/Day | Years  | Date |
|              |       |           | Used   |      |
+--------------+-------+-----------+--------+------+
| Never Smoker |       |           |        |      |
+--------------+-------+-----------+--------+------+
 
 
 
+---------------------+---+---+---+
| Smokeless Tobacco:  |   |   |   |
| Never Used          |   |   |   |
+---------------------+---+---+---+
 
 
 
+---------------------------------------------------------------+
| Comments: some second hand smoke exposure, but fairly minimal |
+---------------------------------------------------------------+
 
 
 
+-------------+-------------+---------+----------+
| Alcohol Use | Drinks/Week | oz/Week | Comments |
+-------------+-------------+---------+----------+
| No          |             |         |          |
+-------------+-------------+---------+----------+
 
 
 
 
+------------------+---------------+
| Sex Assigned at  | Date Recorded |
| Birth            |               |
+------------------+---------------+
| Not on file      |               |
+------------------+---------------+
 
 
 
+----------------+-------------+-------------+
| Job Start Date | Occupation  | Industry    |
+----------------+-------------+-------------+
| Not on file    | Not on file | Not on file |
+----------------+-------------+-------------+
 
 
 
+----------------+--------------+------------+
| Travel History | Travel Start | Travel End |
+----------------+--------------+------------+
 
 
 
+-------------------------------------+
| No recent travel history available. |
+-------------------------------------+
 documented as of this encounter
 
 Plan of Treatment
 
 
+--------+-----------+------------+----------------------+-------------------+
| Date   | Type      | Specialty  | Care Team            | Description       |
+--------+-----------+------------+----------------------+-------------------+
| / | Office    | Cardiology |   Tonia Wilson,    |                   |
|    | Visit     |            | ARNP  [address]   |                   |
|        |           |            | St  [place] [facility] [place]  |                   |
|        |           |            | [address]  [phone]  |                   |
|        |           |            |  [phone] (Fax)  |                   |
+--------+-----------+------------+----------------------+-------------------+
| / | Hospital  | Radiology  |   Popeye Townsend, |                   |
|    | Encounter |            |  MD  [address] |                   |
|        |           |            |  St.  [place],   |                   |
|        |           |            | [place] [address]             |                   |
|        |           |            | [phone]         |                   |
|        |           |            | [phone] (Fax)   |                   |
+--------+-----------+------------+----------------------+-------------------+
| / | Surgery   | Radiology  |   Popeye Townsend, | CV EP PPM SYSTEM  |
|    |           |            |  MD  [address] | IMPLANT           |
|        |           |            |  St.  [place],   |                   |
|        |           |            | [place] [address]             |                   |
|        |           |            | [phone]         |                   |
|        |           |            | [phone] (Fax)   |                   |
+--------+-----------+------------+----------------------+-------------------+
| 02/10/ | Clinical  | Cardiology |                      |                   |
2020   | Support   |            |                      |                   |
+--------+-----------+------------+----------------------+-------------------+
| / | Office    | Cardiology |   Tonia Wilson,    |                   |
|    | Visit     |            | [facility]  [address]   |                   |
 
|        |           |            |   [place]  |                   |
|        |           |            | [address]  [phone]  |                   |
|        |           |            |  [phone] (Fax)  |                   |
+--------+-----------+------------+----------------------+-------------------+
| / | Off-Site  | Nephrology |   Marzena Moser  |                   |
2020   | Visit     |            | DO ETIENNE  [address]      |                   |
|        |           |            | [address]      |                   |
|        |           |            | [place]      |                   |
|        |           |            | [address]  [phone]  |                   |
|        |           |            |  [phone] (Fax)  |                   |
+--------+-----------+------------+----------------------+-------------------+
 documented as of this encounter
 
 Procedures
 
 
+---------------------+--------+------------+----------------------+----------------------+
| Procedure Name      | Priori | Date/Time  | Associated Diagnosis | Comments             |
|                     | ty     |            |                      |                      |
+---------------------+--------+------------+----------------------+----------------------+
| EXTERNAL LAB:       | Routin | 2018 |                      |   Results for this   |
| URINALYSIS          | e      |            |                      | procedure are in the |
|                     |        |            |                      |  results section.    |
+---------------------+--------+------------+----------------------+----------------------+
| EXTERNAL LAB:       | Routin | 2018 |                      |   Results for this   |
| PROTEIN/CREATININE  | e      |            |                      | procedure are in the |
| RATIO               |        |            |                      |  results section.    |
+---------------------+--------+------------+----------------------+----------------------+
 documented in this encounter
 
 Results
 External Lab: Urinalysis (2018)
 
+-------------+----------+-----------+------------+--------------+
| Component   | Value    | Ref Range | Performed  | Pathologist  |
|             |          |           | At         | Signature    |
+-------------+----------+-----------+------------+--------------+
| UA Blood,   | negative |           | EXTERNAL   |              |
| External    |          |           | LAB        |              |
+-------------+----------+-----------+------------+--------------+
| UA Glucose, | normal   |           | EXTERNAL   |              |
|  External   |          |           | LAB        |              |
+-------------+----------+-----------+------------+--------------+
| UA Ketones, | negative |           | EXTERNAL   |              |
|  External   |          |           | LAB        |              |
+-------------+----------+-----------+------------+--------------+
| UA Ph,      | 5        |           | EXTERNAL   |              |
| External    |          |           | LAB        |              |
+-------------+----------+-----------+------------+--------------+
| UA          | normal   |           | EXTERNAL   |              |
| Proteins,   |          |           | LAB        |              |
| External    |          |           |            |              |
+-------------+----------+-----------+------------+--------------+
| UA RBC,     | 2        |           | EXTERNAL   |              |
| External    |          |           | LAB        |              |
+-------------+----------+-----------+------------+--------------+
| UA Specific | 1.012    |           | EXTERNAL   |              |
|  Gravity,   |          |           | LAB        |              |
| External    |          |           |            |              |
+-------------+----------+-----------+------------+--------------+
 
| UA          | trace    |           | EXTERNAL   |              |
| Leukocyte   |          |           | LAB        |              |
| Esterase,   |          |           |            |              |
| External    |          |           |            |              |
+-------------+----------+-----------+------------+--------------+
 
 
 
+--------------------------+
| Resulting Agency Comment |
+--------------------------+
|   [facility]              |
+--------------------------+
 
 
 
+----------------+---------+--------------------+--------------+
| Performing     | Address | City/State/Zipcode | Phone Number |
| Organization   |         |                    |              |
+----------------+---------+--------------------+--------------+
|   EXTERNAL LAB |         |                    |              |
+----------------+---------+--------------------+--------------+
 External Lab: Protein/Creatinine Ratio (2018)
 
+-------------+---------+-----------+------------+--------------+
| Component   | Value   | Ref Range | Performed  | Pathologist  |
|             |         |           | At         | Signature    |
+-------------+---------+-----------+------------+--------------+
| Protein/Cre | 1.3 (A) | 0.0 - 0.2 |            |              |
| atinine     |         |           |            |              |
| Ratio,      |         |           |            |              |
| External    |         |           |            |              |
+-------------+---------+-----------+------------+--------------+
 
 
 
+----------+
| Specimen |
+----------+
|          |
+----------+
 documented in this encounter
 
 Visit Diagnoses
 Not on filedocumented in this encounter

## 2020-01-08 NOTE — XMS
Encounter Summary
  Created on: 2020
 
 Viviana Ricci
 External Reference #: [id number]
 : 47
 Sex: Female
 
 Demographics
 
 
+-----------------------+----------------------+
| Address               | [address]      |
|                       | [place]  [address] |
+-----------------------+----------------------+
| Home Phone            | [phone]      |
+-----------------------+----------------------+
| Preferred Language    | Unknown              |
+-----------------------+----------------------+
| Marital Status        | Single               |
+-----------------------+----------------------+
| [employer]ism Affiliation | 1009                 |
+-----------------------+----------------------+
| Race                  | Unknown              |
+-----------------------+----------------------+
| Ethnic Group          | Unknown              |
+-----------------------+----------------------+
 
 
 Author
 
 
+--------------+--------------------------------------------+
| Author       | [facility]  |
|              | and [place]                                |
+--------------+--------------------------------------------+
| Organization | [facility]  |
|              | and [place]                                |
+--------------+--------------------------------------------+
| Address      | Unknown                                    |
+--------------+--------------------------------------------+
| Phone        | Unavailable                                |
+--------------+--------------------------------------------+
 
 
 
 Support
 
 
+----------------+--------------+---------------------+-----------------+
| Name           | Relationship | Address             | Phone           |
+----------------+--------------+---------------------+-----------------+
| Ada/Ed Radhames | ECON         | [address]              | [phone] |
|                |              | [place]  |                 |
|                |              |  [address]              |                 |
+----------------+--------------+---------------------+-----------------+
 
 
 
 
 Care Team Providers
 
 
+-----------------------+------+-------------+
| Care Team Member Name | Role | Phone       |
+-----------------------+------+-------------+
 PCP  | Unavailable |
+-----------------------+------+-------------+
 
 
 
 Encounter Details
 
 
+--------+-----------+----------------------+----------------------+-------------+
| Date   | Type      | Department           | Care Team            | Description |
+--------+-----------+----------------------+----------------------+-------------+
| / | [facility]  |   [facility] |   [facility]   |             |
| 2000   | Encounter |  [facility] OP | F, MD  [address] |             |
|        |           |   [address]       |   [place]    |             |
|        |           | [place]      | [address]  [phone]  |             |
|        |           | [address]           |  [phone] (Fax)  |             |
|        |           | [phone]         |                      |             |
+--------+-----------+----------------------+----------------------+-------------+
 
 
 
 Social History
 
 
+----------------+-------+-----------+--------+------+
| Tobacco Use    | Types | Packs/Day | Years  | Date |
|                |       |           | Used   |      |
+----------------+-------+-----------+--------+------+
| Never Assessed |       |           |        |      |
+----------------+-------+-----------+--------+------+
 
 
 
+------------------+---------------+
| Sex Assigned at  | Date Recorded |
| Birth            |               |
+------------------+---------------+
| Not on file      |               |
+------------------+---------------+
 
 
 
+----------------+-------------+-------------+
| Job Start Date | Occupation  | Industry    |
+----------------+-------------+-------------+
| Not on file    | Not on file | Not on file |
+----------------+-------------+-------------+
 
 
 
+----------------+--------------+------------+
| Travel History | Travel Start | Travel End |
+----------------+--------------+------------+
 
 
 
 
+-------------------------------------+
| No recent travel history available. |
+-------------------------------------+
 documented as of this encounter
 
 Plan of Treatment
 
 
+--------+-----------+------------+----------------------+-------------------+
| Date   | Type      | Specialty  | Care Team            | Description       |
+--------+-----------+------------+----------------------+-------------------+
| / | Office    | Cardiology |   Tonia Wilson,    |                   |
|    | Visit     |            | ARNJESSICA  [address]   |                   |
|        |           |            | St  [place], [place]  |                   |
|        |           |            | [address]  [phone]  |                   |
|        |           |            |  [phone] (Fax)  |                   |
+--------+-----------+------------+----------------------+-------------------+
| / | Hospital  | Radiology  |   Popeye Townsend, |                   |
|    | Encounter |            |  MD [address] |                   |
|        |           |            |  [place],   |                   |
|        |           |            | [place] [address]             |                   |
|        |           |            | [phone]         |                   |
|        |           |            | [phone] (Fax)   |                   |
+--------+-----------+------------+----------------------+-------------------+
| / | Surgery   | Radiology  |   Popeye Townsend, | CV EP PPM SYSTEM  |
|    |           |            |  MD  [address] | IMPLANT           |
|        |           |            |  [place],   |                   |
|        |           |            | [place] [address]             |                   |
|        |           |            | [phone]         |                   |
|        |           |            | [phone] (Fax)   |                   |
+--------+-----------+------------+----------------------+-------------------+
| 02/10/ | Clinical  | Cardiology |                      |                   |
|    | Support   |            |                      |                   |
+--------+-----------+------------+----------------------+-------------------+
| / | Office    | Cardiology |   Tonia Wilson,    |                   |
|    | Visit     |            | ARNP  [address]   |                   |
|        |           |            | [place]  |                   |
|        |           |            | [address]  [phone]  |                   |
|        |           |            |  [phone] (Fax)  |                   |
+--------+-----------+------------+----------------------+-------------------+
| / | Off-Site  | Nephrology |   Marzena Moser  |                   |
|    | Visit     |            | DO ETIENNE  [address] [place] [address]      |                   |
|        |           |            | [address]      |                   |
|        |           |            | [place]      |                   |
|        |           |            | [address] [phone]  |                   |
|        |           |            |  [phone] (Fax)  |                   |
+--------+-----------+------------+----------------------+-------------------+
 documented as of this encounter
 
 Visit Diagnoses
 Not on filedocumented in this encounter

## 2020-01-08 NOTE — XMS
Encounter Summary
  Created on: 2020
 
 Viviana Ricci
 External Reference #: [id number]
 : 47
 Sex: Female
 
 Demographics
 
 
+-----------------------+----------------------+
| Address               | [address]      |
|                       | [place]  [address] |
+-----------------------+----------------------+
| Home Phone            | [phone]      |
+-----------------------+----------------------+
| Preferred Language    | Unknown              |
+-----------------------+----------------------+
| Marital Status        | Single               |
+-----------------------+----------------------+
| Taoism Affiliation | [id number]                 |
+-----------------------+----------------------+
| Race                  | Unknown              |
+-----------------------+----------------------+
| Ethnic Group          | Unknown              |
+-----------------------+----------------------+
 
 
 Author
 
 
+--------------+--------------------------------------------+
| Author       | [facility]  |
|              | and [facility]                                |
+--------------+--------------------------------------------+
| Organization | [facility]  |
|              | and [facility]                                |
+--------------+--------------------------------------------+
| Address      | Unknown                                    |
+--------------+--------------------------------------------+
| Phone        | Unavailable                                |
+--------------+--------------------------------------------+
 
 
 
 Support
 
 
+----------------+--------------+---------------------+-----------------+
| Name           | Relationship | Address             | Phone           |
+----------------+--------------+---------------------+-----------------+
| Ada/Ed Radhames | ECON         | [address]              | [phone] |
|                |              | [place]  |                 |
|                |              |  [id number]              |                 |
+----------------+--------------+---------------------+-----------------+
 
 
 
 
 Care Team Providers
 
 
+------------------------+------+-----------------+
| Care Team Member Name  | Role | Phone           |
+------------------------+------+-----------------+
| Marzena Moser DO | PCP  | [phone] |
+------------------------+------+-----------------+
 
 
 
 Reason for Visit
 Evaluate & Treat (Routine)
 
+--------+--------+------------+--------------+--------------+---------------+
| Status | Reason | Specialty  | Diagnoses /  | Referred By  | Referred To   |
|        |        |            | Procedures   | Contact      | Contact       |
+--------+--------+------------+--------------+--------------+---------------+
| Closed |        | Nephrology |   Diagnoses  |   Stroemel,  |   Stroemel,   |
|        |        |            |  Unspecified | Marzena M,   | Marzena M, DO |
|        |        |            |              | DO  [address]   [address]    |
|        |        |            | complication |  [address] [address] [place] [address]   |
|        |        |            |  of kidney   |  [address]  [place]  | [address]  [place]    |
|        |        |            | transplant   | [place]    | [place]     |
|        |        |            | FSGS (focal  | [address]        | [address]  Phone: |
|        |        |            | segmental    | Phone:       |  [phone] |
|        |        |            | glomeruloscl | [phone] |   Fax:        |
|        |        |            | erosis)      |   Fax:       | [phone]  |
|        |        |            | Hypertension | [phone] |               |
|        |        |            | , essential  |              |               |
|        |        |            |  Procedures  |              |               |
|        |        |            |  AL OFFICE   |              |               |
|        |        |            | OUTPATIENT   |              |               |
|        |        |            | VISIT 25     |              |               |
|        |        |            | MINUTES      |              |               |
+--------+--------+------------+--------------+--------------+---------------+
 
 
 
 
 Encounter Details
 
 
+--------+-----------+---------------------+----------------------+----------------------+
| Date   | Type      | Department          | Care Team            | Description          |
+--------+-----------+---------------------+----------------------+----------------------+
| / | Off-Site  |   [facility]         |   Marzena Moser  | Kidney replaced by   |
| 2019   | Visit     | NEPHROLOGY  [address]   | M, DO  [address]      | transplant (Primary  |
|        |           | [address]   | [address]      | Dx); Type 2 DM with  |
|        |           | [place], [place]     | [place]      | CKD stage 2 and      |
|        |           | [address]          | [address]  [phone]  | hypertension (HCC);  |
|        |           | [phone]        |  [phone] (Fax)  | Essential            |
|        |           |                     |                      | hypertension,        |
|        |           |                     |                      | malignant; Mixed     |
|        |           |                     |                      | hyperlipidemia       |
+--------+-----------+---------------------+----------------------+----------------------+
 
 
 
 
 Social History
 
 
+--------------+-------+-----------+--------+------+
| Tobacco Use  | Types | Packs/Day | Years  | Date |
|              |       |           | Used   |      |
+--------------+-------+-----------+--------+------+
| Never Smoker |       |           |        |      |
+--------------+-------+-----------+--------+------+
 
 
 
+---------------------+---+---+---+
| Smokeless Tobacco:  |   |   |   |
| Never Used          |   |   |   |
+---------------------+---+---+---+
 
 
 
+---------------------------------------------------------------+
| Comments: some second hand smoke exposure, but fairly minimal |
+---------------------------------------------------------------+
 
 
 
+-------------+-------------+---------+----------+
| Alcohol Use | Drinks/Week | oz/Week | Comments |
+-------------+-------------+---------+----------+
| No          |             |         |          |
+-------------+-------------+---------+----------+
 
 
 
+------------------+---------------+
| Sex Assigned at  | Date Recorded |
| Birth            |               |
+------------------+---------------+
| Not on file      |               |
+------------------+---------------+
 
 
 
+----------------+-------------+-------------+
| Job Start Date | Occupation  | Industry    |
+----------------+-------------+-------------+
| Not on file    | Not on file | Not on file |
+----------------+-------------+-------------+
 
 
 
+----------------+--------------+------------+
| Travel History | Travel Start | Travel End |
+----------------+--------------+------------+
 
 
 
+-------------------------------------+
| No recent travel history available. |
+-------------------------------------+
 documented as of this encounter
 
 
 Last Filed Vital Signs
 
 
+-------------------+-------------------+----------------------+----------+
| Vital Sign        | Reading           | Time Taken           | Comments |
+-------------------+-------------------+----------------------+----------+
| Blood Pressure    | 120/52            | 2019  2:30 PM  |          |
|                   |                   | PDT                  |          |
+-------------------+-------------------+----------------------+----------+
| Pulse             | -                 | -                    |          |
+-------------------+-------------------+----------------------+----------+
| Temperature       | 36   C (96.8   F) | 2019  2:30 PM  |          |
|                   |                   | PDT                  |          |
+-------------------+-------------------+----------------------+----------+
| Respiratory Rate  | -                 | -                    |          |
+-------------------+-------------------+----------------------+----------+
| Oxygen Saturation | -                 | -                    |          |
+-------------------+-------------------+----------------------+----------+
| Inhaled Oxygen    | -                 | -                    |          |
| Concentration     |                   |                      |          |
+-------------------+-------------------+----------------------+----------+
| Weight            | -                 | -                    |          |
+-------------------+-------------------+----------------------+----------+
| Height            | -                 | -                    |          |
+-------------------+-------------------+----------------------+----------+
| Body Mass Index   | -                 | -                    |          |
+-------------------+-------------------+----------------------+----------+
 documented in this encounter
 
 Progress Notes
 Marzena Moser,  - 2019  2:00 PM PDTFormatting of this note might be different
 from the original.
 Subjective: NEPHROLOGY  
  
 Patient ID: Viviana Ricci is a 73 y.o. female.
 
 HPI Comments: 
 Followup for this very pleasant, 73 YOWF   who is s/p a renal allograft, 05, at Health system w
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
ypothyroidism, hyperlipidemia, SHPTH,  previous low back pain, morbid obesity/JACK, chronic p
ulmonary  hypertension, historically related to centripetal obesity, and  CAD, s/p CABG x3 v
kayla,Kandi. 
 
 She states that she still drives but otherwise is still fairly sedentary.  She does not wal
k outside of her dwelling, e.g. house.for example, she sends her knees to the grocery store 
with money to purchase her groceries weekly rather than walk behind a grocery cart.  She sta
deb that walking causes too much neck and back pain but more likely not this is related to h
er large BMI.  Again, she refuses to weigh here in clinic.  Viviana is very pleasant but appear
s apathetic about lifestyle modification, especially weight loss over time.
 
 MEDS:
 
 Prograf 1.5 mg, AM and 1 mg, PM.
 Mycophenolate 250 mg, BID.
 Prednisone 5 mg, daily.
 Outpatient Prescriptions Marked as Taking for the 19 encounter (Off-Site Visit) with Maye Moser, DO 
 Medication Sig Dispense Refill 
   allopurinol (ZYLOPRIM) 100 mg tablet take 1 tablet by mouth once daily 30 tablet 11 
 
   aspirin 81 mg EC tablet Take 81 mg by mouth Daily.   
   B-D INS SYRINGE 0.5CC/31GX5/16 31G X 5/16" 0.5 ML MISC USE BEFORE MEALS AS DIRECTED 1 e
ach 11 
   cholecalciferol (VITAMIN D-3) 2000 UNITS TABS Take 5,000 Units by mouth Every other day
.   
   cinacalcet (SENSIPAR) 30 mg tablet take 1 tablet by mouth once daily 90 tablet 3 
   cyclobenzaprine (FLEXERIL) 10 mg tablet Take 1 tablet by mouth Twice  daily as needed f
or Muscle spasms. 45 tablet 0 
   fludrocortisone (FLORINEF) 0.1 mg tablet take 1 tablet by mouth every other day 90 tabl
et 4 
   furosemide (LASIX) 40 mg tablet Take 1 tablet by mouth Daily as needed. 30 tablet 11 
   glucose blood VI test strips (ONE TOUCH ULTRA TEST) strip Check blood sugar before each
 meal and as directed 100 each 12 
   insulin glargine (LANTUS) 100 units/mL injection (vial) inject 20 units subcutaneously 
every morning 10 vial 11 
   insulin lispro (HUMALOG) 100 units/mL injection (vial) inject subcutaneously BEFORE CHIKA
LS ACCORDING TO SLIDING SCALE Max dose 20 units daily 10 vial 11 
   levothyroxine (SYNTHROID) 50 mcg tablet take 1 tablet by mouth once daily 30 tablet 11 
   lisinopril (PRINIVIL,ZESTRIL) 30 MG tablet take 1 tablet by mouth once daily 90 tablet 
3 
   loperamide (ANTI-DIARRHEAL) 2 mg capsule Take 1 capsule by mouth 4 times daily as neede
d. 60 capsule 5 
   losartan (COZAAR) 50 mg tablet take 1 tablet by mouth once daily 90 tablet 3 
   magnesium oxide (MAG-OX) 400 mg tablet take 1 tablet by mouth twice a day 60 tablet 11 
   metoprolol tartrate (LOPRESSOR) 25 mg tablet take 1 tablet by mouth twice a day 60 tabl
et 11 
   omeprazole (PRILOSEC) 20 mg capsule Take 20 mg by mouth every morning (before breakfast
).   
   Prenatal Vit-Fe Fumarate-FA (PNV PRENATAL PLUS MULTIVITAMIN) 27-1 MG TABS take 1 tablet
 by mouth once daily. 30 tablet 11 
   QVAR REDIHALER 80 MCG/ACT inhaler    
   Respiratory Therapy Supplies MISC Continue nocturnal O2 at 2 l/m through CPAP for lifet
bart. Dx: JACK G47.33 Please provide new nasal mask for CPAP and any other needed replacement 
supplies. 1 each 0 
   Respiratory Therapy Supplies MISC Decrease CPAP to 12-18 cmH2O Diagnosis Code(s)327.23.
 Please send order to [facility]. 1 each 0 
   rosuvastatin (CRESTOR) 20 mg tablet take 1 tablet by mouth NIGHTLY 30 tablet 11 
   tacrolimus (PROGRAF) 1 mg capsule Take 1 capsule by mouth 2 times daily. With 0.5 mg ca
psule to equal 1.5 mg BID   
  
 
 No Known Allergies 
 
 Objective: /52  | Temp 36 C (96.8 F)   
 
 Physical Exam
 HEENT: No thrush.
 Heart:  Regular rate and rhythm with no S3, S4, murmur or rub.
 Lungs:  CTA bilaterally.  No rales or wheezes.
 Abdomen:  soft, obese,  nontender,  NABS.
 Extremities: no edema,  clubbing, or cyanosis. No foot ulcers.
 
 Lab Results 
 Component Value Date 
  NAEX 141 2019 
  KEX 4.5 2019 
  CLEX 105 2019 
  CO2EX 19 2019 
  BUNEX 44 (A) 2019 
  CREEX 1.42 (A) 2019 
 
  EGFREX 36 2019 
  GLUEX 129 (A) 2019 
  PHOSEX 2.9 2019 
  MGEX 1.6 (A) 2019 
  PTHEX 193.0 (A) 2016 
  JFR1HXR 7.2 2019 
 
 Lab Results 
 Component Value Date 
  CHOLEX 134 2019 
  HDLEX 54.8 2019 
  LDLEX 50 2019 
  TRIGEX 146 2019 
 
 Lab Results 
 Component Value Date 
  WBCEX 5.4 2019 
  HGBEX 13 2019 
  HCTEX 39 2019 
  PLTEX 160 2019 
 
 Lab Results 
 Component Value Date 
  TACROLIMUSEX 6.5 2019 
 
 Urine Pro/Cr ratio = 
 Lab Results 
 Component Value Date 
  PROTEX 0.250 (A) 2019 
 
 Assessment: 
 
 1.   Renal Allograft, 05 --  Her Scr is slightly increased.  Her tacro level is within
 goal but slightly higher than previous?
 2.   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
 3.   Type 2 DM, requiring insuline--good control.
 4.   Hyperlipidemia--stable on rosuvastatin.
 5.   Hypertension-- excellent control.
 6.   SHPTH-- stable.
 7.   Morbid Obesity/JACK/Pulmonary hypertension--clinically about the same.
 8.   CAD, s/p CABG  3 vessels, --stable.
 9.   Type IV RTA--compensated.
 10.  Chronic Low Back pain--in remission.
 11.   chronic DJD, left knee--  same. 
 
 Plan: 
 
 1.  I reviewed with Viviana her lab, Scr.  Her Scr does appear to be minimally creeping upward
.  Therefore, will have her decrease the tacrolimus to 1 mg BID.  She will recheck the level
 in 1 week.  If this becomes too low she may need a every 48 hour, alternating regimen?
 2.  Unfortunately, while Viviana is very pleasant, however, she still appears apathetic about 
lifestyle changes.  She does appear to take her immunosuppression religiously.
 3.  I did encourage her that her glycemic control appears excellent.
 4.  Will plan to see her back in  6  months at the CKD Clinic at [facility] [place].  Wi
ll review her next tacrolimus level when available.   She will continue to repeat her Standi
ng Order, drug level, HbA1c, lipid profile every 3 months.
 
 Electronically signed by Marzena Moser DO. 19 11:40 
 
 CC:  Jose David Dalal M.D., [facility], [facility]
 
            Andres Wilson ARNPElectronically signed by Marzena Moser DO at 2019
 12:09 PM PDTdocumented in this encounter
 
 Plan of Treatment
 
 
+--------+-----------+------------+----------------------+-------------------+
| Date   | Type      | Specialty  | Care Team            | Description       |
+--------+-----------+------------+----------------------+-------------------+
| / | Office    | Cardiology |   Tonia Wilson,    |                   |
|    | Visit     |            | ARNP  [address]   |                   |
|        |           |            | [place]  |                   |
|        |           |            | [address]  [phone]  |                   |
|        |           |            |  [phone] (Fax)  |                   |
+--------+-----------+------------+----------------------+-------------------+
| / | Hospital  | Radiology  |   Popeye Townsend, |                   |
|    | Encounter |            |  MD  [address] |                   |
|        |           |            |  [place],   |                   |
|        |           |            | [place] [address]             |                   |
|        |           |            | [phone]         |                   |
|        |           |            | [phone] (Fax)   |                   |
+--------+-----------+------------+----------------------+-------------------+
| / | Surgery   | Radiology  |   Popeye Townsend, | CV EP PPM SYSTEM  |
|    |           |            |  MD  [address] | IMPLANT           |
|        |           |            |  St.  [place],   |                   |
|        |           |            | [place] [address]             |                   |
|        |           |            | [phone]         |                   |
|        |           |            | [phone] (Fax)   |                   |
+--------+-----------+------------+----------------------+-------------------+
| 02/10/ | Clinical  | Cardiology |                      |                   |
|    | Support   |            |                      |                   |
+--------+-----------+------------+----------------------+-------------------+
| / | Office    | Cardiology |   Tonia Wilson,    |                   |
|    | Visit     |            | ARNJESSICA  [address]   |                   |
|        |           |            |   [place]  |                   |
|        |           |            | [address]  [phone]  |                   |
|        |           |            |  [phone] (Fax)  |                   |
+--------+-----------+------------+----------------------+-------------------+
| / | Off-Site  | Nephrology |   Marzena Moser  |                   |
|    | Visit     |            | DO ETIENNE  [address]      |                   |
|        |           |            | [address]      |                   |
|        |           |            | [place]      |                   |
|        |           |            | [address]  [phone]  |                   |
|        |           |            |  [phone] (Fax)  |                   |
+--------+-----------+------------+----------------------+-------------------+
 documented as of this encounter
 
 Procedures
 
 
+----------------+--------+------------+----------------------+----------------------+
| Procedure Name | Priori | Date/Time  | Associated Diagnosis | Comments             |
|                | ty     |            |                      |                      |
+----------------+--------+------------+----------------------+----------------------+
| EXTERNAL LAB:  | Routin | 2019 |                      |   Results for this   |
| HEMOGLOBIN A1C | e      |            |                      | procedure are in the |
|                |        |            |                      |  results section.    |
+----------------+--------+------------+----------------------+----------------------+
 documented in this encounter
 
 
 Results
 External Lab: Hemoglobin A1c (2019)
 
+-------------+-------+-----------+------------+--------------+
| Component   | Value | Ref Range | Performed  | Pathologist  |
|             |       |           | At         | Signature    |
+-------------+-------+-----------+------------+--------------+
| Hemoglobin  | 7.2   | %         |            |              |
| A1c,        |       |           |            |              |
| external    |       |           |            |              |
+-------------+-------+-----------+------------+--------------+
 
 
 
+----------+
| Specimen |
+----------+
| Blood    |
+----------+
 documented in this encounter
 
 Visit Diagnoses
 
 
+-----------------------------------------------------+
| Diagnosis                                           |
+-----------------------------------------------------+
|   Kidney replaced by transplant - Primary           |
+-----------------------------------------------------+
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
+-----------------------------------------------------+
|   Essential hypertension, malignant                 |
+-----------------------------------------------------+
|   Mixed hyperlipidemia                              |
+-----------------------------------------------------+
 documented in this encounter

## 2020-01-08 NOTE — XMS
Encounter Summary
  Created on: 2020
 
 Viviana Ricci
 External Reference #: [id number]
 : 47
 Sex: Female
 
 Demographics
 
 
+-----------------------+----------------------+
| Address               | [address]      |
|                       | [place]  [address] |
+-----------------------+----------------------+
| Home Phone            | [phone]      |
+-----------------------+----------------------+
| Preferred Language    | Unknown              |
+-----------------------+----------------------+
| Marital Status        | Single               |
+-----------------------+----------------------+
| Sabianism Affiliation | [id number]                 |
+-----------------------+----------------------+
| Race                  | Unknown              |
+-----------------------+----------------------+
| Ethnic Group          | Unknown              |
+-----------------------+----------------------+
 
 
 Author
 
 
+--------------+--------------------------------------------+
| Author       | [facility]  |
|              | and [facility]                                |
+--------------+--------------------------------------------+
| Organization | [facility]  |
|              | and [facility]                                |
+--------------+--------------------------------------------+
| Address      | Unknown                                    |
+--------------+--------------------------------------------+
| Phone        | Unavailable                                |
+--------------+--------------------------------------------+
 
 
 
 Support
 
 
+----------------+--------------+---------------------+-----------------+
| Name           | Relationship | Address             | Phone           |
+----------------+--------------+---------------------+-----------------+
| Ada/Ed Radhames | ECON         | [address]              | [phone] |
|                |              | [place]  |                 |
|                |              |  [id number]              |                 |
+----------------+--------------+---------------------+-----------------+
 
 
 
 
 Care Team Providers
 
 
+-----------------------+------+-------------+
| Care Team Member Name | Role | Phone       |
+-----------------------+------+-------------+
 PCP  | Unavailable |
+-----------------------+------+-------------+
 
 
 
 Reason for Visit
 Evaluate & Treat (Routine)
 
+--------+--------+------------+--------------+--------------+---------------+
| Status | Reason | Specialty  | Diagnoses /  | Referred By  | Referred To   |
|        |        |            | Procedures   | Contact      | Contact       |
+--------+--------+------------+--------------+--------------+---------------+
| Closed |        | Nephrology |   Diagnoses  |   Stroemel,  |   Stroemel,   |
|        |        |            |  Unspecified | Marzena CLIFTON,   | Marzena CLIFTON DO |
|        |        |            |              | DO  [address] |   [address]    |
|        |        |            | complication |  [address]   |
|        |        |            |  of kidney   |  [address]  [place]  | [address]  [place]    |
|        |        |            | transplant   | [place]    | [place]     |
|        |        |            | FSGS (focal  | [address]        | [address]  Phone: |
|        |        |            | segmental    | Phone:       |  [phone] |
|        |        |            | glomeruloscl | [phone] |   Fax:        |
|        |        |            | erosis)      |   Fax:       | [phone]  |
|        |        |            | Hypertension | [phone] |               |
|        |        |            | , essential  |              |               |
|        |        |            |  Procedures  |              |               |
|        |        |            |  [facility] OFFICE   |              |               |
|        |        |            | OUTPATIENT   |              |               |
|        |        |            | VISIT 25     |              |               |
|        |        |            | MINUTES      |              |               |
+--------+--------+------------+--------------+--------------+---------------+
 
 
 
 
 Encounter Details
 
 
+--------+-----------+---------------------+----------------------+----------------------+
| Date   | Type      | Department          | Care Team            | Description          |
+--------+-----------+---------------------+----------------------+----------------------+
| / | Off-Site  |   [facility]         |   Marzena Moser  | Hypertension,        |
| 2017   | Visit     | NEPHROLOGY  [address]   | M, DO  [address]      | essential (Primary   |
|        |           | [address]   | [address]      | Dx); Kidney replaced |
|        |           | [place], [place]     | [place]      |  by transplant; Type |
|        |           | [id number]          | [address]  [phone]  |  2 DM with CKD stage |
|        |           | [phone]        |  [phone] (Fax)  |  2 and hypertension  |
|        |           |                     |                      | (HCC); Coronary      |
|        |           |                     |                      | artery disease       |
|        |           |                     |                      | involving native     |
|        |           |                     |                      | coronary artery of   |
|        |           |                     |                      | native heart without |
|        |           |                     |                      |  angina pectoris     |
+--------+-----------+---------------------+----------------------+----------------------+
 
 
 
 
 Social History
 
 
+--------------+-------+-----------+--------+------+
| Tobacco Use  | Types | Packs/Day | Years  | Date |
|              |       |           | Used   |      |
+--------------+-------+-----------+--------+------+
| Never Smoker |       |           |        |      |
+--------------+-------+-----------+--------+------+
 
 
 
+---------------------+---+---+---+
| Smokeless Tobacco:  |   |   |   |
| Never Used          |   |   |   |
+---------------------+---+---+---+
 
 
 
+---------------------------------------------------------------+
| Comments: some second hand smoke exposure, but fairly minimal |
+---------------------------------------------------------------+
 
 
 
+-------------+-------------+---------+----------+
| Alcohol Use | Drinks/Week | oz/Week | Comments |
+-------------+-------------+---------+----------+
| No          |             |         |          |
+-------------+-------------+---------+----------+
 
 
 
+------------------+---------------+
| Sex Assigned at  | Date Recorded |
| Birth            |               |
+------------------+---------------+
| Not on file      |               |
+------------------+---------------+
 
 
 
+----------------+-------------+-------------+
| Job Start Date | Occupation  | Industry    |
+----------------+-------------+-------------+
| Not on file    | Not on file | Not on file |
+----------------+-------------+-------------+
 
 
 
+----------------+--------------+------------+
| Travel History | Travel Start | Travel End |
+----------------+--------------+------------+
 
 
 
+-------------------------------------+
 
| No recent travel history available. |
+-------------------------------------+
 documented as of this encounter
 
 Last Filed Vital Signs
 
 
+-------------------+----------------------+----------------------+----------+
| Vital Sign        | Reading              | Time Taken           | Comments |
+-------------------+----------------------+----------------------+----------+
| Blood Pressure    | 132/80               | 2017  2:07 PM  |          |
|                   |                      | PST                  |          |
+-------------------+----------------------+----------------------+----------+
| Pulse             | -                    | -                    |          |
+-------------------+----------------------+----------------------+----------+
| Temperature       | 35.6   C (96   F)    | 2017  2:07 PM  |          |
|                   |                      | PST                  |          |
+-------------------+----------------------+----------------------+----------+
| Respiratory Rate  | -                    | -                    |          |
+-------------------+----------------------+----------------------+----------+
| Oxygen Saturation | -                    | -                    |          |
+-------------------+----------------------+----------------------+----------+
| Inhaled Oxygen    | -                    | -                    |          |
| Concentration     |                      |                      |          |
+-------------------+----------------------+----------------------+----------+
| Weight            | 123.6 kg (272 lb 7.8 | 2017  2:07 PM  |          |
|                   |  oz)                 | PST                  |          |
+-------------------+----------------------+----------------------+----------+
| Height            | -                    | -                    |          |
+-------------------+----------------------+----------------------+----------+
| Body Mass Index   | 43.98                | 2015  3:02 PM  |          |
|                   |                      | PST                  |          |
+-------------------+----------------------+----------------------+----------+
 documented in this encounter
 
 Progress Notes
 Marzena Moser DO - 2017  2:12 PM PSTFormatting of this note might be different
 from the original.
 Subjective:  NEPHROLOGY 
  
 Patient ID: Viviana Ricci is a 71 y.o. female.
 
 HPI Comments: 
 Followup for this 70 YO  white female s/p renal allograft, 05, with remote  allograft 
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
demia, SHPTH,  previous low back pain, obesity/JACK, chronic pulmonary  hypertension, histori
izabela related to centripetal obesity, and  CAD, s/p CABG x3 vessels,. 
 
 Viviana appears very consistent in her med management, lab, and routine follow up of her allog
raft.  She denies any increased edema, ROSADO, graft tenderness, or fever.
 
 MEDS:
 
 Prograf 1.5 mg, BID
 Mycophenolate 250 mg, BID.
 Prednisone 5 mg, daily.
 Outpatient Prescriptions Marked as Taking for the 17 encounter (Off-Site Visit) with Maye Moser, DO 
 Medication Sig Dispense Refill 
   allopurinol (ZYLOPRIM) 100 mg tablet Take 1 tablet by mouth Daily. 30 tablet 11 
 
   cholecalciferol (VITAMIN D-3) 2000 UNITS TABS Take 5,000 Units by mouth Every other day
.   
   cinacalcet (SENSIPAR) 30 mg tablet Take 1 tablet by mouth Daily. 30 tablet 11 
   fludrocortisone (FLORINEF) 0.1 mg tablet Take 1 tablet by mouth Every other day. 90 tab
let 4 
   [DISCONTINUED] fludrocortisone (FLORINEF) 0.1 mg tablet Take 1 tablet by mouth Every ot
her day. 45 tablet 3 
   furosemide (LASIX) 40 mg tablet Take 1 tablet by mouth Daily as needed. 30 tablet 5 
   glucose blood VI test strips (ONE TOUCH ULTRA TEST) strip Check blood sugar before each
 meal and as directed 100 each 12 
   insulin glargine (LANTUS) 100 units/mL injection (vial) Inject 20 Units under the skin 
every morning. 10 mL 11 
   insulin lispro (HUMALOG) 100 units/mL injection (vial) Inject subcutaneously before sara
ls according to sliding scale 10 vial 11 
   Insulin Syringe-Needle U-100 (BD INSULIN SYRINGE ULTRAFINE) 31G X 5/16" 0.5 ML MISC Use
 before meals and as directed. 100 each 11 
   levothyroxine (LEVOTHROID) 50 mcg tablet Take 1 tablet by mouth Daily. 30 tablet 11 
   lisinopril (PRINIVIL,ZESTRIL) 30 MG tablet Take 1 tablet by mouth Daily. 90 tablet 3 
   loperamide (ANTI-DIARRHEAL) 2 mg capsule Take 1 capsule by mouth 4 times daily as neede
d. 60 capsule 5 
   losartan (COZAAR) 50 mg tablet Take 1 tablet by mouth Daily. 90 tablet 3 
   magnesium oxide (MAG-OX) 400 mg tablet Take 1 tablet by mouth 2 times daily. 60 tablet 
11 
   metoprolol tartrate (LOPRESSOR) 25 mg tablet Take 1 tablet by mouth 2 times daily. 60 t
ablet 11 
   omeprazole (PRILOSEC) 20 mg capsule Take one capsule by mouth once daily on an empty st
omach 90 capsule 4 
   Prenatal Multivit-Min-Fe-FA (PRENATAL VITAMINS) 0.8 MG TABS Take 0.8 mg by mouth Daily.
 30 each 11 
   Respiratory Therapy Supplies MISC Decrease CPAP to 12-18 cmH2O Diagnosis Code(s)327.23.
 Please send order to [facility]. 1 each 0 
   rosuvastatin (CRESTOR) 20 mg tablet Take 1 tablet by mouth nightly. 30 tablet 11 
 
 No Known Allergies
 
 Objective:   Blood pressure 132/80, temperature 35.6 C (96 F), weight 123.6 kg (272 lb 
7.8 oz).
  
  
 
 Physical Exam
 
 HEENT:  no thrush.
 Heart:  Regular rate and rhythm with no S3, S4, murmur or rub.
 Lungs:  CTA  in all fields.
 Abdomen:  soft, obese, renal allograft in RLQ is nontender, normoactive bowel sounds.
 Extremities:  trace edema, no clubbing, cyanosis,  no foot ulcers.
 
 Lab Results 
 Component Value Date 
  NAEX 142 2017 
  KEX 5.1 2017 
  CLEX 110 2017 
  CO2EX 19 2017 
  BUNEX 42* 2017 
  CREEX 1.4* 2017 
  EGFREX 37 2017 
  GLUEX 129* 2017 
  PHOSEX 2.9 2017 
  MGEX 1.9 2017 
 
  PTHEX 193.0* 2016 
  YDP3BXI 7.6 2017 
 
 Lab Results 
 Component Value Date 
  CHOLEX 143 2017 
  HDLEX 50.2 2017 
  LDLEX 55 2017 
  TRIGEX 190* 2017 
 
 Lab Results 
 Component Value Date 
  WBCEX 5.5 2017 
  HGBEX 13.8 2017 
  HCTEX 43.3 2017 
  PLTEX 169 2017 
 
 Lab Results 
 Component Value Date 
  TACROLIMUSEX 6.3 2017 
 
 Lab Results 
 Component Value Date 
  UAEX Negative 2017 
  UAEX normal 2017 
  UAEX negative 2017 
  UAEX 5 2017 
  UAEX normal 2017 
  UAEX 0 2017 
  UAEX 1.012 2017 
  UAEX 100 2017 
 
 Assessment: 
 
 1.  Renal Allograft, 05 -- her allograft fucntion is stable.
 2.  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
 3.  Type 2 DM, requiring insuline-- good control.
 4.  Hyperlipidemia--on statin Rx
 5.  Hypertension-- very good control.
 6.  SHPTH--stable.  
 7.  Obesity/JACK/Pulmonary hypertension-- compensated.
 8.  CAD, s/p CABG, --stable on ASA, metoprolol, atorvastatin.
 9.  Type IV RTA--stable.
 10. Chronic Low Back pain--in remission.
 11.   DJD, left knee--about same.
 12.  s/p DVT left leg, 2015-- stable off of apixaban.
 
 Plan: 
 
 1.  I reviewed with Viviana that her Scr and tacro. levels appear stable.
 2.  Overall, she appears to be following a low salt diet, and doing as well as possible wit
h her diabetic control.
 3.  She will continue to do her Standing Order every 4 mo.
 4.  Will plan to see her back at the CKD Clinic at [facility], in 6 mo. with her Standing Order
 1 week before.
 
 Electronically signed by: Marzena Moser, 2017 14:12 
 
 CC:    Jose David Dalal M.D., [facility], [facility]
            Edgar Sanders MD, [facility], Orthopedics
 
            ONOFRE WHITLOCK M.D., F.A.C.S
 
  Electronically signed by Marzena Moser DO at 02/10/2017  6:07 PM PSTdocumented in th
is encounter
 
 Plan of Treatment
 
 
+--------+-----------+------------+----------------------+-------------------+
| Date   | Type      | Specialty  | Care Team            | Description       |
+--------+-----------+------------+----------------------+-------------------+
| / | Office    | Cardiology |   Tonia Wilson,    |                   |
|    | Visit     |            | [facility]  [address]   |                   |
|        |           |            | [place], [place]  |                   |
|        |           |            | [address]  [phone]  |                   |
|        |           |            |  [phone] (Fax)  |                   |
+--------+-----------+------------+----------------------+-------------------+
| / | Hospital  | Radiology  |   Popeye Townsend, |                   |
|    | Encounter |            |  MD  [address] |                   |
|        |           |            |  [place],   |                   |
|        |           |            | [place] [address]             |                   |
|        |           |            | [phone]         |                   |
|        |           |            | [phone] (Fax)   |                   |
+--------+-----------+------------+----------------------+-------------------+
| / | Surgery   | Radiology  |   Popeye Townsend, | CV EP PPM SYSTEM  |
|    |           |            |  MD  [address] | IMPLANT           |
|        |           |            |  [place],   |                   |
|        |           |            | [place] [address]             |                   |
|        |           |            | [phone]         |                   |
|        |           |            | [phone] (Fax)   |                   |
+--------+-----------+------------+----------------------+-------------------+
| 02/10/ | Clinical  | Cardiology |                      |                   |
|    | Support   |            |                      |                   |
+--------+-----------+------------+----------------------+-------------------+
| / | Office    | Cardiology |   Tonia Wilson,    |                   |
|    | Visit     |            | [facility]  [address]   |                   |
|        |           |            | [place]  |                   |
|        |           |            | [address]  [phone]  |                   |
|        |           |            |  [phone] (Fax)  |                   |
+--------+-----------+------------+----------------------+-------------------+
| / | Off-Site  | Nephrology |   Marzena Moser  |                   |
|    | Visit     |            | DO [facility]  [address] [place] [address]      |                   |
|        |           |            | [address]      |                   |
|        |           |            | [place]      |                   |
|        |           |            | [address]  [phone]  |                   |
|        |           |            |  [phone] (Fax)  |                   |
+--------+-----------+------------+----------------------+-------------------+
 documented as of this encounter
 
 Procedures
 
 
+----------------------+--------+-------------+----------------------+----------------------
+
| Procedure Name       | Priori | Date/Time   | Associated Diagnosis | Comments             
|
|                      | ty     |             |                      |                      
|
+----------------------+--------+-------------+----------------------+----------------------
+
 
| LABS - EXTERNAL SCAN |        | 2018  |                      |   Results for this   
|
|                      |        | 12:00 AM    |                      | procedure are in the 
|
|                      |        | PDT         |                      |  results section.    
|
+----------------------+--------+-------------+----------------------+----------------------
+
| LABS - EXTERNAL SCAN |        | 2017  |                      |   Results for this   
|
|                      |        | 12:00 AM    |                      | procedure are in the 
|
|                      |        | PDT         |                      |  results section.    
|
+----------------------+--------+-------------+----------------------+----------------------
+
| LABS - EXTERNAL SCAN |        | 2017  |   Kidney replaced by |   Results for this   
|
|                      |        | 12:00 AM    |  transplant          | procedure are in the 
|
|                      |        | PST         | Hypertension,        |  results section.    
|
|                      |        |             | essential  Type 2 DM |                      
|
|                      |        |             |  with CKD stage 2    |                      
|
|                      |        |             | and hypertension     |                      
|
|                      |        |             | (HCC)  Coronary      |                      
|
|                      |        |             | artery disease       |                      
|
|                      |        |             | involving native     |                      
|
|                      |        |             | coronary artery of   |                      
|
|                      |        |             | native heart without |                      
|
|                      |        |             |  angina pectoris     |                      
|
+----------------------+--------+-------------+----------------------+----------------------
+
| LABS - EXTERNAL SCAN |        | 2017  |   Kidney replaced by |   Results for this   
|
|                      |        | 12:00 AM    |  transplant          | procedure are in the 
|
|                      |        | PST         | Hypertension,        |  results section.    
|
|                      |        |             | essential  Type 2 DM |                      
|
|                      |        |             |  with CKD stage 2    |                      
|
|                      |        |             | and hypertension     |                      
|
|                      |        |             | ([facility])  Coronary      |                      
|
|                      |        |             | artery disease       |                      
|
|                      |        |             | involving native     |                      
|
 
|                      |        |             | coronary artery of   |                      
|
|                      |        |             | native heart without |                      
|
|                      |        |             |  angina pectoris     |                      
|
+----------------------+--------+-------------+----------------------+----------------------
+
 documented in this encounter
 
 Results
 LABS - EXTERNAL SCAN (2018 12:00 AM PDT)
 
+------------------------------------------------------------------------+--------------+
| Narrative                                                              | Performed At |
+------------------------------------------------------------------------+--------------+
|   This result has an attachment that is not available.  Ordered by an  |              |
| unspecified provider.                                                  |              |
+------------------------------------------------------------------------+--------------+
 LABS - EXTERNAL SCAN (2017 12:00 AM PDT)
 
+------------------------------------------------------------------------+--------------+
| Narrative                                                              | Performed At |
+------------------------------------------------------------------------+--------------+
|   This result has an attachment that is not available.  Ordered by an  |              |
| unspecified provider.                                                  |              |
+------------------------------------------------------------------------+--------------+
 LABS - EXTERNAL SCAN (2017 12:00 AM PST)
 
+------------------------------------------------------------------------+--------------+
| Narrative                                                              | Performed At |
+------------------------------------------------------------------------+--------------+
|   This result has an attachment that is not available.  Ordered by an  |              |
| unspecified provider.                                                  |              |
+------------------------------------------------------------------------+--------------+
 LABS - EXTERNAL SCAN (2017 12:00 AM PST)
 
+------------------------------------------------------------------------+--------------+
| Narrative                                                              | Performed At |
+------------------------------------------------------------------------+--------------+
|   This result has an attachment that is not available.  Ordered by an  |              |
| unspecified provider.                                                  |              |
+------------------------------------------------------------------------+--------------+
 documented in this encounter
 
 Visit Diagnoses
 
 
+-------------------------------------------------------------------------------------+
| Diagnosis                                                                           |
+-------------------------------------------------------------------------------------+
|   Hypertension, essential - Primary  Unspecified essential hypertension             |
+-------------------------------------------------------------------------------------+
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
+-------------------------------------------------------------------------------------+
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
+-------------------------------------------------------------------------------------+
|   Coronary artery disease involving native coronary artery of native heart without  |
| angina pectoris                                                                     |
+-------------------------------------------------------------------------------------+
 
 documented in this encounter

## 2020-01-08 NOTE — XMS
Encounter Summary
  Created on: 2020
 
 Viviana Ricci
 External Reference #: [id number]
 : 47
 Sex: Female
 
 Demographics
 
 
+-----------------------+----------------------+
| Address               | [address]      |
|                       | [place]  [address] |
+-----------------------+----------------------+
| Home Phone            | [phone]      |
+-----------------------+----------------------+
| Preferred Language    | Unknown              |
+-----------------------+----------------------+
| Marital Status        | Single               |
+-----------------------+----------------------+
| [employer] Affiliation | 1009                 |
+-----------------------+----------------------+
| Race                  | Unknown              |
+-----------------------+----------------------+
| Ethnic Group          | Unknown              |
+-----------------------+----------------------+
 
 
 Author
 
 
+--------------+--------------------------------------------+
| Author       | [facility]  |
|              | and [facility]                                |
+--------------+--------------------------------------------+
| Organization | [facility]  |
|              | and [facility]                                |
+--------------+--------------------------------------------+
| Address      | Unknown                                    |
+--------------+--------------------------------------------+
| Phone        | Unavailable                                |
+--------------+--------------------------------------------+
 
 
 
 Support
 
 
+----------------+--------------+---------------------+-----------------+
| Name           | Relationship | Address             | Phone           |
+----------------+--------------+---------------------+-----------------+
| Ada/Ed Radhames | ECON         | [place]              | [phone] |
|                |              | [place]  |                 |
|                |              |  [address]              |                 |
+----------------+--------------+---------------------+-----------------+
 
 
 
 
 Care Team Providers
 
 
+-----------------------+------+-------------+
| Care Team Member Name | Role | Phone       |
+-----------------------+------+-------------+
 PCP  | Unavailable |
+-----------------------+------+-------------+
 
 
 
 Reason for Visit
 
 
+-------------------+----------+
| Reason            | Comments |
+-------------------+----------+
| Medication Refill |          |
+-------------------+----------+
 
 
 
 Encounter Details
 
 
+--------+--------+---------------------+----------------------+-------------------+
| Date   | Type   | Department          | Care Team            | Description       |
+--------+--------+---------------------+----------------------+-------------------+
| / | Refill |   [facility]         |   Marzena Moser  | Medication Refill |
|    |        | NEPHROLOGY  [address]   | M, DO  [address]      |                   |
|        |        | [address]   | [address]      |                   |
|        |        | [place], [place]     | [place], [place]      |                   |
|        |        | [address]          | [address]  [phone]  |                   |
|        |        | [phone]        |  [phone] (Fax)  |                   |
+--------+--------+---------------------+----------------------+-------------------+
 
 
 
 Social History
 
 
+--------------+-------+-----------+--------+------+
| Tobacco Use  | Types | Packs/Day | Years  | Date |
|              |       |           | Used   |      |
+--------------+-------+-----------+--------+------+
| Never Smoker |       |           |        |      |
+--------------+-------+-----------+--------+------+
 
 
 
+---------------------+---+---+---+
| Smokeless Tobacco:  |   |   |   |
| Never Used          |   |   |   |
+---------------------+---+---+---+
 
 
 
+---------------------------------------------------------------+
| Comments: some second hand smoke exposure, but fairly minimal |
 
+---------------------------------------------------------------+
 
 
 
+-------------+-------------+---------+----------+
| Alcohol Use | Drinks/Week | oz/Week | Comments |
+-------------+-------------+---------+----------+
| No          |             |         |          |
+-------------+-------------+---------+----------+
 
 
 
+------------------+---------------+
| Sex Assigned at  | Date Recorded |
| Birth            |               |
+------------------+---------------+
| Not on file      |               |
+------------------+---------------+
 
 
 
+----------------+-------------+-------------+
| Job Start Date | Occupation  | Industry    |
+----------------+-------------+-------------+
| Not on file    | Not on file | Not on file |
+----------------+-------------+-------------+
 
 
 
+----------------+--------------+------------+
| Travel History | Travel Start | Travel End |
+----------------+--------------+------------+
 
 
 
+-------------------------------------+
| No recent travel history available. |
+-------------------------------------+
 documented as of this encounter
 
 Plan of Treatment
 
 
+--------+-----------+------------+----------------------+-------------------+
| Date   | Type      | Specialty  | Care Team            | Description       |
+--------+-----------+------------+----------------------+-------------------+
| / | Office    | Cardiology |   HellalfredoTnoia,    |                   |
|    | Visit     |            | ARNP  [address]   |                   |
|        |           |            | St  [place], [place]  |                   |
|        |           |            | [address]  [phone]  |                   |
|        |           |            |  [phone] (Fax)  |                   |
+--------+-----------+------------+----------------------+-------------------+
| / | Hospital  | Radiology  |   Popeye Townsend, |                   |
|    | Encounter |            |  MD  [address] |                   |
|        |           |            |  St.  [place],   |                   |
|        |           |            | [place] [address]             |                   |
|        |           |            | [phone]         |                   |
|        |           |            | [phone] (Fax)   |                   |
+--------+-----------+------------+----------------------+-------------------+
| / | Surgery   | Radiology  |   Popeye Townsend, | CV EP PPM SYSTEM  |
 
|    |           |            |  MD  [address] | IMPLANT           |
|        |           |            |  St.  [place],   |                   |
|        |           |            | [place] [address]             |                   |
|        |           |            | [phone]         |                   |
|        |           |            | [phone] (Fax)   |                   |
+--------+-----------+------------+----------------------+-------------------+
| 02/10/ | Clinical  | Cardiology |                      |                   |
|    | Support   |            |                      |                   |
+--------+-----------+------------+----------------------+-------------------+
| / | Office    | Cardiology |   Tonia Wilson,    |                   |
|    | Visit     |            | ARNP  [address]   |                   |
|        |           |            | [place]  |                   |
|        |           |            | [address]  [phone]  |                   |
|        |           |            |  [phone] (Fax)  |                   |
+--------+-----------+------------+----------------------+-------------------+
| / | Off-Site  | Nephrology |   Marzena Moser  |                   |
|    | Visit     |            | DO ETIENNE  [address] [place] [address]      |                   |
|        |           |            | [address]      |                   |
|        |           |            | [place]      |                   |
|        |           |            | [address]  [phone]  |                   |
|        |           |            |  [phone] (Fax)  |                   |
+--------+-----------+------------+----------------------+-------------------+
 documented as of this encounter
 
 Visit Diagnoses
 Not on filedocumented in this encounter

## 2020-01-08 NOTE — XMS
Encounter Summary
  Created on: 2020
 
 Viviana Ricci
 External Reference #: [id number]
 : 47
 Sex: Female
 
 Demographics
 
 
+-----------------------+----------------------+
| Address               | [address]      |
|                       | [place]  [address] |
+-----------------------+----------------------+
| Home Phone            | [phone]      |
+-----------------------+----------------------+
| Preferred Language    | Unknown              |
+-----------------------+----------------------+
| Marital Status        | Single               |
+-----------------------+----------------------+
| Taoist Affiliation | 1009                 |
+-----------------------+----------------------+
| Race                  | Unknown              |
+-----------------------+----------------------+
| Ethnic Group          | Unknown              |
+-----------------------+----------------------+
 
 
 Author
 
 
+--------------+--------------------------------------------+
| Author       | [facility]  |
|              | and [place]                                |
+--------------+--------------------------------------------+
| Organization | [facility]  |
|              | and [place]                                |
+--------------+--------------------------------------------+
| Address      | Unknown                                    |
+--------------+--------------------------------------------+
| Phone        | Unavailable                                |
+--------------+--------------------------------------------+
 
 
 
 Support
 
 
+----------------+--------------+---------------------+-----------------+
| Name           | Relationship | Address             | Phone           |
+----------------+--------------+---------------------+-----------------+
| Ada/Ed Radhames | ECON         | [place]              | [phone] |
|                |              | [place]  |                 |
|                |              |  [address]              |                 |
+----------------+--------------+---------------------+-----------------+
 
 
 
 
 Care Team Providers
 
 
+-----------------------+------+-------------+
| Care Team Member Name | Role | Phone       |
+-----------------------+------+-------------+
 PCP  | Unavailable |
+-----------------------+------+-------------+
 
 
 
 Reason for Visit
 
 
+-------------------+----------+
| Reason            | Comments |
+-------------------+----------+
| Generalized Body  |          |
| Aches             |          |
+-------------------+----------+
 
 
 
 Encounter Details
 
 
+--------+-----------+---------------------+----------------------+-------------------+
| Date   | Type      | Department          | Care Team            | Description       |
+--------+-----------+---------------------+----------------------+-------------------+
| / | Telephone |   [facility] [place]         |   Marzena Moser  | Generalized Body  |
|    |           | NEPHROLOGY  [address]   | M, DO  [address]      | Aches             |
|        |           | [address]   | [address]      |                   |
|        |           | [place], [place]     | [place], [place]      |                   |
|        |           | [address]          | [address]  [phone]  |                   |
|        |           | [phone]        |  [phone] (Fax)  |                   |
+--------+-----------+---------------------+----------------------+-------------------+
 
 
 
 Social History
 
 
+--------------+-------+-----------+--------+------+
| Tobacco Use  | Types | Packs/Day | Years  | Date |
|              |       |           | Used   |      |
+--------------+-------+-----------+--------+------+
| Never Smoker |       |           |        |      |
+--------------+-------+-----------+--------+------+
 
 
 
+---------------------+---+---+---+
| Smokeless Tobacco:  |   |   |   |
| Never Used          |   |   |   |
+---------------------+---+---+---+
 
 
 
+---------------------------------------------------------------+
 
| Comments: some second hand smoke exposure, but fairly minimal |
+---------------------------------------------------------------+
 
 
 
+-------------+-------------+---------+----------+
| Alcohol Use | Drinks/Week | oz/Week | Comments |
+-------------+-------------+---------+----------+
| No          |             |         |          |
+-------------+-------------+---------+----------+
 
 
 
+------------------+---------------+
| Sex Assigned at  | Date Recorded |
| Birth            |               |
+------------------+---------------+
| Not on file      |               |
+------------------+---------------+
 
 
 
+----------------+-------------+-------------+
| Job Start Date | Occupation  | Industry    |
+----------------+-------------+-------------+
| Not on file    | Not on file | Not on file |
+----------------+-------------+-------------+
 
 
 
+----------------+--------------+------------+
| Travel History | Travel Start | Travel End |
+----------------+--------------+------------+
 
 
 
+-------------------------------------+
| No recent travel history available. |
+-------------------------------------+
 documented as of this encounter
 
 Plan of Treatment
 
 
+--------+-----------+------------+----------------------+-------------------+
| Date   | Type      | Specialty  | Care Team            | Description       |
+--------+-----------+------------+----------------------+-------------------+
| / | Office    | Cardiology |   Arlen Wilsona,    |                   |
|    | Visit     |            | [address]   |                   |
|        |           |            | [place], [place]  |                   |
|        |           |            | [address]  [phone]  |                   |
|        |           |            |  [phone] (Fax)  |                   |
+--------+-----------+------------+----------------------+-------------------+
| / | Hospital  | Radiology  |   Popeye Townsend, |                   |
|    | Encounter |            |  MD  [address] |                   |
|        |           |            |  [place],   |                   |
|        |           |            | [place] [address]             |                   |
|        |           |            | [phone]         |                   |
|        |           |            | [phone] (Fax)   |                   |
+--------+-----------+------------+----------------------+-------------------+
 
| / | Surgery   | Radiology  |   Popeye Townsend, | CV EP PPM SYSTEM  |
|    |           |            |  MD  [address] | IMPLANT           |
|        |           |            |  St.  [place],   |                   |
|        |           |            | [place] [address]             |                   |
|        |           |            | [phone]         |                   |
|        |           |            | [phone] (Fax)   |                   |
+--------+-----------+------------+----------------------+-------------------+
| 02/10/ | Clinical  | Cardiology |                      |                   |
|    | Support   |            |                      |                   |
+--------+-----------+------------+----------------------+-------------------+
| / | Office    | Cardiology |   Tonia Wilson,    |                   |
|    | Visit     |            | BELKIS  [address]   |                   |
|        |           |            | [place]  |                   |
|        |           |            | [id number] [phone]  |                   |
|        |           |            |  [phone] (Fax)  |                   |
+--------+-----------+------------+----------------------+-------------------+
| / | Off-Site  | Nephrology |   Marzena Moser  |                   |
|    | Visit     |            | DO ETIENNE  [address] [place] [address]      |                   |
|        |           |            | [address]      |                   |
|        |           |            | [place]      |                   |
|        |           |            | [address] [phone]  |                   |
|        |           |            |  [phone] (Fax)  |                   |
+--------+-----------+------------+----------------------+-------------------+
 documented as of this encounter
 
 Visit Diagnoses
 Not on filedocumented in this encounter

## 2020-01-08 NOTE — XMS
Encounter Summary
  Created on: 2020
 
 Viviana Ricci
 External Reference #: [id number]
 : 47
 Sex: Female
 
 Demographics
 
 
+-----------------------+----------------------+
| Address               | [address]      |
|                       | [place]  [address] |
+-----------------------+----------------------+
| Home Phone            | [phone]      |
+-----------------------+----------------------+
| Preferred Language    | Unknown              |
+-----------------------+----------------------+
| Marital Status        | Single               |
+-----------------------+----------------------+
| Confucianism Affiliation | [id number]                 |
+-----------------------+----------------------+
| Race                  | Unknown              |
+-----------------------+----------------------+
| Ethnic Group          | Unknown              |
+-----------------------+----------------------+
 
 
 Author
 
 
+--------------+--------------------------------------------+
| Author       | [facility]  |
|              | and [facility]                                |
+--------------+--------------------------------------------+
| Organization | [facility]  |
|              | and [facility]                                |
+--------------+--------------------------------------------+
| Address      | Unknown                                    |
+--------------+--------------------------------------------+
| Phone        | Unavailable                                |
+--------------+--------------------------------------------+
 
 
 
 Support
 
 
+----------------+--------------+---------------------+-----------------+
| Name           | Relationship | Address             | Phone           |
+----------------+--------------+---------------------+-----------------+
| Ada/Ed Radhames | ECON         | [address]              | [phone] |
|                |              | [place]  |                 |
|                |              |  [address]              |                 |
+----------------+--------------+---------------------+-----------------+
 
 
 
 
 Care Team Providers
 
 
+-----------------------+------+-------------+
| Care Team Member Name | Role | Phone       |
+-----------------------+------+-------------+
 PCP  | Unavailable |
+-----------------------+------+-------------+
 
 
 
 Encounter Details
 
 
+--------+-----------+----------------------+-----------+-------------+
| Date   | Type      | Department           | Care Team | Description |
+--------+-----------+----------------------+-----------+-------------+
| / | Hospital  |   [facility] |           |             |
|    | Encounter |  [facility] |           |             |
|        |           |   [address]       |           |             |
|        |           | [place]      |           |             |
|        |           | [address]           |           |             |
|        |           | [phone]         |           |             |
+--------+-----------+----------------------+-----------+-------------+
 
 
 
 Social History
 
 
+----------------+-------+-----------+--------+------+
| Tobacco Use    | Types | Packs/Day | Years  | Date |
|                |       |           | Used   |      |
+----------------+-------+-----------+--------+------+
| Never Assessed |       |           |        |      |
+----------------+-------+-----------+--------+------+
 
 
 
+------------------+---------------+
| Sex Assigned at  | Date Recorded |
| Birth            |               |
+------------------+---------------+
| Not on file      |               |
+------------------+---------------+
 
 
 
+----------------+-------------+-------------+
| Job Start Date | Occupation  | Industry    |
+----------------+-------------+-------------+
| Not on file    | Not on file | Not on file |
+----------------+-------------+-------------+
 
 
 
+----------------+--------------+------------+
| Travel History | Travel Start | Travel End |
+----------------+--------------+------------+
 
 
 
 
+-------------------------------------+
| No recent travel history available. |
+-------------------------------------+
 documented as of this encounter
 
 Plan of Treatment
 
 
+--------+-----------+------------+----------------------+-------------------+
| Date   | Type      | Specialty  | Care Team            | Description       |
+--------+-----------+------------+----------------------+-------------------+
| / | Office    | Cardiology |   Tonia Wilson,    |                   |
|    | Visit     |            | ARNJESSICA  [address]   |                   |
|        |           |            | St  [place] [facility], [place]  |                   |
|        |           |            | [address]  [phone]  |                   |
|        |           |            |  [phone] (Fax)  |                   |
+--------+-----------+------------+----------------------+-------------------+
| / | Hospital  | Radiology  |   Popeye Townsend, |                   |
|    | Encounter |            |  MD  [address] |                   |
|        |           |            |  St.  [place],   |                   |
|        |           |            | [place] [address]             |                   |
|        |           |            | [phone]         |                   |
|        |           |            | [phone] (Fax)   |                   |
+--------+-----------+------------+----------------------+-------------------+
| / | Surgery   | Radiology  |   Popeye Townsend, | CV EP PPM SYSTEM  |
|    |           |            |  MD  [address] | IMPLANT           |
|        |           |            |  St.  [place],   |                   |
|        |           |            | [place] [address]             |                   |
|        |           |            | [phone]         |                   |
|        |           |            | [phone] (Fax)   |                   |
+--------+-----------+------------+----------------------+-------------------+
| 02/10/ | Clinical  | Cardiology |                      |                   |
|    | Support   |            |                      |                   |
+--------+-----------+------------+----------------------+-------------------+
| / | Office    | Cardiology |   Tonia Wilson,    |                   |
|    | Visit     |            | [facility]  [address]   |                   |
|        |           |            | [place]  |                   |
|        |           |            | [address]  [phone]  |                   |
|        |           |            |  [phone] (Fax)  |                   |
+--------+-----------+------------+----------------------+-------------------+
| / | Off-Site  | Nephrology |   Marzena Moser  |                   |
|    | Visit     |            | DO ETIENNE  [address] [place] [address]      |                   |
|        |           |            | [address]      |                   |
|        |           |            | [place]      |                   |
|        |           |            | [address] [phone]  |                   |
|        |           |            |  [phone] (Fax)  |                   |
+--------+-----------+------------+----------------------+-------------------+
 documented as of this encounter
 
 Visit Diagnoses
 Not on filedocumented in this encounter

## 2020-01-08 NOTE — XMS
Encounter Summary
  Created on: 2020
 
 Viviana Ricci
 External Reference #: [id number]
 : 47
 Sex: Female
 
 Demographics
 
 
+-----------------------+----------------------+
| Address               | [address]      |
|                       | [place]  [address] |
+-----------------------+----------------------+
| Home Phone            | [phone]      |
+-----------------------+----------------------+
| Preferred Language    | Unknown              |
+-----------------------+----------------------+
| Marital Status        | Single               |
+-----------------------+----------------------+
| Worship Affiliation | [id number]                 |
+-----------------------+----------------------+
| Race                  | Unknown              |
+-----------------------+----------------------+
| Ethnic Group          | Unknown              |
+-----------------------+----------------------+
 
 
 Author
 
 
+--------------+--------------------------------------------+
| Author       | [facility]  |
|              | and [facility]                                |
+--------------+--------------------------------------------+
| Organization | [facility]  |
|              | and [facility]                                |
+--------------+--------------------------------------------+
| Address      | Unknown                                    |
+--------------+--------------------------------------------+
| Phone        | Unavailable                                |
+--------------+--------------------------------------------+
 
 
 
 Support
 
 
+----------------+--------------+---------------------+-----------------+
| Name           | Relationship | Address             | Phone           |
+----------------+--------------+---------------------+-----------------+
| Ada/Ed Radhames | ECON         | [address]              | [phone] |
|                |              | [place]  |                 |
|                |              |  [address]              |                 |
+----------------+--------------+---------------------+-----------------+
 
 
 
 
 Care Team Providers
 
 
+-----------------------+------+-------------+
| Care Team Member Name | Role | Phone       |
+-----------------------+------+-------------+
 PCP  | Unavailable |
+-----------------------+------+-------------+
 
 
 
 Reason for Visit
 
 
+-------------------+----------+
| Reason            | Comments |
+-------------------+----------+
| Medication Refill |          |
+-------------------+----------+
 
 
 
 Encounter Details
 
 
+--------+--------+---------------------+----------------------+-------------------+
| Date   | Type   | Department          | Care Team            | Description       |
+--------+--------+---------------------+----------------------+-------------------+
| / | Refill |   [facility]         |   Marzena Moser  | Medication Refill |
|    |        | NEPHROLOGY  [address]   | M, DO  [address]      |                   |
|        |        | [address]   | [address]      |                   |
|        |        | [place], [place]     | [place], [place]      |                   |
|        |        | [address]          | [address]  [phone]  |                   |
|        |        | [phone]        |  [phone] (Fax)  |                   |
+--------+--------+---------------------+----------------------+-------------------+
 
 
 
 Social History
 
 
+--------------+-------+-----------+--------+------+
| Tobacco Use  | Types | Packs/Day | Years  | Date |
|              |       |           | Used   |      |
+--------------+-------+-----------+--------+------+
| Never Smoker |       |           |        |      |
+--------------+-------+-----------+--------+------+
 
 
 
+---------------------+---+---+---+
| Smokeless Tobacco:  |   |   |   |
| Never Used          |   |   |   |
+---------------------+---+---+---+
 
 
 
+---------------------------------------------------------------+
| Comments: some second hand smoke exposure, but fairly minimal |
 
+---------------------------------------------------------------+
 
 
 
+-------------+-------------+---------+----------+
| Alcohol Use | Drinks/Week | oz/Week | Comments |
+-------------+-------------+---------+----------+
| No          |             |         |          |
+-------------+-------------+---------+----------+
 
 
 
+------------------+---------------+
| Sex Assigned at  | Date Recorded |
| Birth            |               |
+------------------+---------------+
| Not on file      |               |
+------------------+---------------+
 
 
 
+----------------+-------------+-------------+
| Job Start Date | Occupation  | Industry    |
+----------------+-------------+-------------+
| Not on file    | Not on file | Not on file |
+----------------+-------------+-------------+
 
 
 
+----------------+--------------+------------+
| Travel History | Travel Start | Travel End |
+----------------+--------------+------------+
 
 
 
+-------------------------------------+
| No recent travel history available. |
+-------------------------------------+
 documented as of this encounter
 
 Plan of Treatment
 
 
+--------+-----------+------------+----------------------+-------------------+
| Date   | Type      | Specialty  | Care Team            | Description       |
+--------+-----------+------------+----------------------+-------------------+
| / | Office    | Cardiology |   HellalfredoTonia,    |                   |
|    | Visit     |            | ARNP  [address]   |                   |
|        |           |            | St  [place], [place]  |                   |
|        |           |            | [address]  [phone]  |                   |
|        |           |            |  [phone] (Fax)  |                   |
+--------+-----------+------------+----------------------+-------------------+
| / | Hospital  | Radiology  |   Popeye Townsend, |                   |
|    | Encounter |            |  MD  [address] |                   |
|        |           |            |  St.  [place],   |                   |
|        |           |            | [place] [address]             |                   |
|        |           |            | [phone]         |                   |
|        |           |            | [phone] (Fax)   |                   |
+--------+-----------+------------+----------------------+-------------------+
| / | Surgery   | Radiology  |   Popeye Townsend, | CV EP PPM SYSTEM  |
 
|    |           |            |  MD  [address] | IMPLANT           |
|        |           |            |  St.  [place],   |                   |
|        |           |            | [place] [address]             |                   |
|        |           |            | [phone]         |                   |
|        |           |            | [phone] (Fax)   |                   |
+--------+-----------+------------+----------------------+-------------------+
| 02/10/ | Clinical  | Cardiology |                      |                   |
|    | Support   |            |                      |                   |
+--------+-----------+------------+----------------------+-------------------+
| / | Office    | Cardiology |   Tonia Wilson,    |                   |
|    | Visit     |            | ARNP  [address]   |                   |
|        |           |            | [place]  |                   |
|        |           |            | [address]  [phone]  |                   |
|        |           |            |  [phone] (Fax)  |                   |
+--------+-----------+------------+----------------------+-------------------+
| / | Off-Site  | Nephrology |   Marzena Moser  |                   |
|    | Visit     |            | DO ETIENNE  [address] [place] [address]      |                   |
|        |           |            | [address]      |                   |
|        |           |            | [place]      |                   |
|        |           |            | [address]  [phone]  |                   |
|        |           |            |  [phone] (Fax)  |                   |
+--------+-----------+------------+----------------------+-------------------+
 documented as of this encounter
 
 Visit Diagnoses
 Not on filedocumented in this encounter

## 2020-01-08 NOTE — XMS
Encounter Summary
  Created on: 2020
 
 Ras Hardy
 External Reference #: [id number]
 : 47
 Sex: Female
 
 Demographics
 
 
+-----------------------+----------------------+
| Address               | [address]      |
|                       | [place]  [address] |
+-----------------------+----------------------+
| Home Phone            | [phone]      |
+-----------------------+----------------------+
| Preferred Language    | Unknown              |
+-----------------------+----------------------+
| Marital Status        | Single               |
+-----------------------+----------------------+
| Alevism Affiliation | [id number]                 |
+-----------------------+----------------------+
| Race                  | Unknown              |
+-----------------------+----------------------+
| Ethnic Group          | Unknown              |
+-----------------------+----------------------+
 
 
 Author
 
 
+--------------+--------------------------------------------+
| Author       | [facility]  |
|              | and [facility]                                |
+--------------+--------------------------------------------+
| Organization | [facility]  |
|              | and [facility]                                |
+--------------+--------------------------------------------+
| Address      | Unknown                                    |
+--------------+--------------------------------------------+
| Phone        | Unavailable                                |
+--------------+--------------------------------------------+
 
 
 
 Support
 
 
+----------------+--------------+---------------------+-----------------+
| Name           | Relationship | Address             | Phone           |
+----------------+--------------+---------------------+-----------------+
| Ada/Ed Radhames | ECON         | [address]              | [phone] |
|                |              | [place], [place]  |                 |
|                |              |  [id number]              |                 |
+----------------+--------------+---------------------+-----------------+
 
 
 
 
 Care Team Providers
 
 
+-----------------------+------+-------------+
| Care Team Member Name | Role | Phone       |
+-----------------------+------+-------------+
 PCP  | Unavailable |
+-----------------------+------+-------------+
 
 
 
 Encounter Details
 
 
+--------+-----------+----------------------+----------------+----------------------+
| Date   | Type      | Department           | Care Team      | Description          |
+--------+-----------+----------------------+----------------+----------------------+
| / | [facility]  |   [facility] |   [place],  | Shortness of breath; |
|    | Encounter |  [facility]  | Nena GUSMAN MD  |  Cough               |
|        |           | CONV DEPT  [address]     |                |                      |
|        |           | [address]  [place], |                |                      |
|        |           |  [place] [address]       |                |                      |
|        |           | [phone]         |                |                      |
+--------+-----------+----------------------+----------------+----------------------+
 
 
 
 Social History
 
 
+--------------+-------+-----------+--------+------+
| Tobacco Use  | Types | Packs/Day | Years  | Date |
|              |       |           | Used   |      |
+--------------+-------+-----------+--------+------+
| Never Smoker |       |           |        |      |
+--------------+-------+-----------+--------+------+
 
 
 
+---------------------+---+---+---+
| Smokeless Tobacco:  |   |   |   |
| Never Used          |   |   |   |
+---------------------+---+---+---+
 
 
 
+---------------------------------------------------------------+
| Comments: some second hand smoke exposure, but fairly minimal |
+---------------------------------------------------------------+
 
 
 
+-------------+-------------+---------+----------+
| Alcohol Use | Drinks/Week | oz/Week | Comments |
+-------------+-------------+---------+----------+
| No          |             |         |          |
+-------------+-------------+---------+----------+
 
 
 
 
+------------------+---------------+
| Sex Assigned at  | Date Recorded |
| Birth            |               |
+------------------+---------------+
| Not on file      |               |
+------------------+---------------+
 
 
 
+----------------+-------------+-------------+
| Job Start Date | Occupation  | Industry    |
+----------------+-------------+-------------+
| Not on file    | Not on file | Not on file |
+----------------+-------------+-------------+
 
 
 
+----------------+--------------+------------+
| Travel History | Travel Start | Travel End |
+----------------+--------------+------------+
 
 
 
+-------------------------------------+
| No recent travel history available. |
+-------------------------------------+
 documented as of this encounter
 
 Medications at Time of Discharge
 
 
+----------------------+----------------------+-----------+---------+----------+-----------+
| Medication           | Sig                  | Dispensed | Refills | Start    | End Date  |
|                      |                      |           |         | Date     |           |
+----------------------+----------------------+-----------+---------+----------+-----------+
|   glucose blood VI   | Check blood sugar    |   100     | 12      | 20 |           |
| test strips (ONE     | before each meal and | each      |         | 12       |           |
| TOUCH ULTRA TEST)    |  as directed         |           |         |          |           |
| stripIndications:    |                      |           |         |          |           |
| Unspecified          |                      |           |         |          |           |
| hypertensive kidney  |                      |           |         |          |           |
| disease with chronic |                      |           |         |          |           |
|  kidney disease      |                      |           |         |          |           |
| stage I through      |                      |           |         |          |           |
| stage IV, or         |                      |           |         |          |           |
| unspecified(403.90), |                      |           |         |          |           |
|  Complications of    |                      |           |         |          |           |
| transplanted kidney, |                      |           |         |          |           |
|  Diabetes mellitus   |                      |           |         |          |           |
| type II,             |                      |           |         |          |           |
| uncontrolled ([facility]),  |                      |           |         |          |           |
| Hyperlipidemia       |                      |           |         |          |           |
+----------------------+----------------------+-----------+---------+----------+-----------+
|   albuterol (PROAIR  | Inhale 2 puffs into  |   1       | 2       | 20 |  |
| HFA) 90 mcg/puff     | the lungs every 6    | Inhaler   |         | 13       | 4         |
| inhalerIndications:  | hours as needed for  |           |         |          |           |
| Cough                | Wheezing.            |           |         |          |           |
+----------------------+----------------------+-----------+---------+----------+-----------+
|   allopurinol        | Take 100 mg by mouth |           | 0       | 20 |  |
 
| (ZYLOPRIM) 100 mg    |  Daily.              |           |         | 12       | 3         |
| tablet               |                      |           |         |          |           |
+----------------------+----------------------+-----------+---------+----------+-----------+
|   aspirin 81 MG EC   | Take 81 mg by mouth  |           | 0       | 20 |  |
| tablet               | Daily.               |           |         | 12       | 5         |
+----------------------+----------------------+-----------+---------+----------+-----------+
|   Cholecalciferol    | Take 5,000 Units by  |           | 0       | 20 |  |
| (VITAMIN D3) 5000    | mouth Once a week.   |           |         | 12       | 4         |
| UNITS CAPS           |                      |           |         |          |           |
+----------------------+----------------------+-----------+---------+----------+-----------+
|   cinacalcet         | Take 1 tablet by     |   30      | 12      | 10/29/20 |  |
| (SENSIPAR) 30 mg     | mouth Daily.         | tablet    |         | 12       | 3         |
| tablet               |                      |           |         |          |           |
+----------------------+----------------------+-----------+---------+----------+-----------+
|   fludrocortisone    | Take 1 tablet by     |   30      | 11      | 10/01/20 |  |
| (FLORINEF) 0.1 mg    | mouth Every other    | tablet    |         | 12       | 4         |
| tablet               | day.                 |           |         |          |           |
+----------------------+----------------------+-----------+---------+----------+-----------+
|   furosemide (LASIX) | Take two tablets by  |   60      | 5       | 20 | 07/15/201 |
|  40 mg tablet        | mouth daily.         | tablet    |         | 12       | 3         |
+----------------------+----------------------+-----------+---------+----------+-----------+
|   insulin glargine   | Inject 20 Units      |           | 0       | 20 |  |
| (LANTUS) 100         | under the skin every |           |         | 12       | 3         |
| units/mL             |  morning.            |           |         |          |           |
| injectionIndications |                      |           |         |          |           |
| : Unspecified        |                      |           |         |          |           |
| hypertensive kidney  |                      |           |         |          |           |
| disease with chronic |                      |           |         |          |           |
|  kidney disease      |                      |           |         |          |           |
| stage I through      |                      |           |         |          |           |
| stage IV, or         |                      |           |         |          |           |
| unspecified(403.90), |                      |           |         |          |           |
|  Complications of    |                      |           |         |          |           |
| transplanted kidney, |                      |           |         |          |           |
|  Diabetes mellitus   |                      |           |         |          |           |
| type II,             |                      |           |         |          |           |
| uncontrolled (HCC),  |                      |           |         |          |           |
| Hyperlipidemia       |                      |           |         |          |           |
+----------------------+----------------------+-----------+---------+----------+-----------+
|   insulin lispro     | Inject               |           | 0       | 20 |  |
| (HUMALOG) 100        | subcutaneously       |           |         | 12       | 3         |
| units/mL injection   | before meals         |           |         |          |           |
|                      | according to sliding |           |         |          |           |
|                      |  scale               |           |         |          |           |
+----------------------+----------------------+-----------+---------+----------+-----------+
|   Insulin            | Use before meals and |   100     | 11      | 20 |  |
| Syringe-Needle U-100 |  as directed.        | each      |         | 13       | 4         |
|  (BD INSULIN SYRINGE |                      |           |         |          |           |
|  ULTRAFINE) 31G X    |                      |           |         |          |           |
| " 0.5 ML MISC    |                      |           |         |          |           |
+----------------------+----------------------+-----------+---------+----------+-----------+
|   levothyroxine      | Take 1 tablet by     |   30      | 11      | 10/01/20 |  |
| (LEVOTHROID) 50 mcg  | mouth Daily.         | tablet    |         | 12       | 3         |
| tablet               |                      |           |         |          |           |
+----------------------+----------------------+-----------+---------+----------+-----------+
|   lisinopril         | Take 1 tablet by     |   90      | 3       | 20 |  |
| (PRINIVIL,ZESTRIL)   | mouth Daily.         | tablet    |         | 12       | 3         |
| 30 MG tablet         |                      |           |         |          |           |
+----------------------+----------------------+-----------+---------+----------+-----------+
|   loperamide         | Take 2 mg by mouth   |           | 0       | 20 |  |
 
| (ANTI-DIARRHEAL) 2   | Daily as needed.     |           |         | 12       | 4         |
| mg capsule           |                      |           |         |          |           |
+----------------------+----------------------+-----------+---------+----------+-----------+
|   magnesium oxide    | Take 400 mg by mouth |           | 0       | 20 |  |
| (MAG-OX) 400 mg      |  2 times daily.      |           |         | 12       | 3         |
| tablet               |                      |           |         |          |           |
+----------------------+----------------------+-----------+---------+----------+-----------+
|   metoprolol         | Take 1 tablet by     |   60      | 11      | 20 |  |
| tartrate (LOPRESSOR) | mouth 2 times daily. | tablet    |         | 13       | 4         |
|  25 mg tablet        |                      |           |         |          |           |
+----------------------+----------------------+-----------+---------+----------+-----------+
|   mycophenolate      | Take 250 mg by mouth |           | 0       | 20 | 10/14/201 |
| (CELLCEPT) 250 mg    |  3 times daily.      |           |         | 11       | 5         |
| capsule              |                      |           |         |          |           |
+----------------------+----------------------+-----------+---------+----------+-----------+
|   omeprazole         | Take one capsule by  |           | 0       | 20 |  |
| (PRILOSEC) 20 mg     | mouth once daily on  |           |         | 12       | 3         |
| capsule              | an empty stomach     |           |         |          |           |
+----------------------+----------------------+-----------+---------+----------+-----------+
|   predniSONE         | Take 1 tablet by     |   90      | 3       | 20 |  |
| (DELTASONE) 5 mg     | mouth Daily.         | tablet    |         | 12       | 4         |
| tablet               |                      |           |         |          |           |
+----------------------+----------------------+-----------+---------+----------+-----------+
|   Prenatal           | Take 0.8 mg by mouth |   30 each | 11      | 20 |  |
| Multivit-Min-Fe-FA   |  Daily.              |           |         | 13       | 3         |
| (PRENATAL VITAMINS)  |                      |           |         |          |           |
| 0.8 MG TABS          |                      |           |         |          |           |
+----------------------+----------------------+-----------+---------+----------+-----------+
|   rosuvastatin       | Take 20 mg by mouth  |           | 0       |          |  |
| (CRESTOR) 40 MG      | nightly.             |           |         |          | 3         |
| tabletIndications:   |                      |           |         |          |           |
| Unspecified          |                      |           |         |          |           |
| hypertensive kidney  |                      |           |         |          |           |
| disease with chronic |                      |           |         |          |           |
|  kidney disease      |                      |           |         |          |           |
| stage I through      |                      |           |         |          |           |
| stage IV, or         |                      |           |         |          |           |
| unspecified(403.90), |                      |           |         |          |           |
|  Complications of    |                      |           |         |          |           |
| transplanted kidney, |                      |           |         |          |           |
|  Diabetes mellitus   |                      |           |         |          |           |
| type II,             |                      |           |         |          |           |
| uncontrolled (HCC),  |                      |           |         |          |           |
| Hyperlipidemia       |                      |           |         |          |           |
+----------------------+----------------------+-----------+---------+----------+-----------+
|   tacrolimus         | TAD.                 |           | 0       | 20 | 07/15/201 |
| (PROGRAF) 0.5 mg     |                      |           |         | 12       | 4         |
| capsuleIndications:  |                      |           |         |          |           |
| Unspecified          |                      |           |         |          |           |
| hypertensive kidney  |                      |           |         |          |           |
| disease with chronic |                      |           |         |          |           |
|  kidney disease      |                      |           |         |          |           |
| stage I through      |                      |           |         |          |           |
| stage IV, or         |                      |           |         |          |           |
| unspecified(403.90), |                      |           |         |          |           |
|  Complications of    |                      |           |         |          |           |
| transplanted kidney, |                      |           |         |          |           |
|  Diabetes mellitus   |                      |           |         |          |           |
| type II,             |                      |           |         |          |           |
| uncontrolled (HCC),  |                      |           |         |          |           |
 
| Hyperlipidemia       |                      |           |         |          |           |
+----------------------+----------------------+-----------+---------+----------+-----------+
|   tacrolimus         | Take 1 mg by mouth 2 |           | 0       | 20 |  |
| (PROGRAF) 1 mg       |  times daily.        |           |         | 12       | 3         |
| capsuleIndications:  |                      |           |         |          |           |
| Unspecified          |                      |           |         |          |           |
| hypertensive kidney  |                      |           |         |          |           |
| disease with chronic |                      |           |         |          |           |
|  kidney disease      |                      |           |         |          |           |
| stage I through      |                      |           |         |          |           |
| stage IV, or         |                      |           |         |          |           |
| unspecified(403.90), |                      |           |         |          |           |
|  Complications of    |                      |           |         |          |           |
| transplanted kidney, |                      |           |         |          |           |
|  Diabetes mellitus   |                      |           |         |          |           |
| type II,             |                      |           |         |          |           |
| uncontrolled (HCC),  |                      |           |         |          |           |
| Hyperlipidemia       |                      |           |         |          |           |
+----------------------+----------------------+-----------+---------+----------+-----------+
|   valsartan (DIOVAN) | Take 1 tablet by     |   30      | 11      | 20 |  |
|  160 mg tablet       | mouth Daily.         | tablet    |         | 13       | 4         |
+----------------------+----------------------+-----------+---------+----------+-----------+
 documented as of this encounter
 
 Plan of Treatment
 
 
+--------+-----------+------------+----------------------+-------------------+
| Date   | Type      | Specialty  | Care Team            | Description       |
+--------+-----------+------------+----------------------+-------------------+
| / | Office    | Cardiology |   Tonia Wilson,    |                   |
|    | Visit     |            | ARNP  [address]   |                   |
|        |           |            | St  [place]  |                   |
|        |           |            | [address]  [phone]  |                   |
|        |           |            |  [phone] (Fax)  |                   |
+--------+-----------+------------+----------------------+-------------------+
| / | Hospital  | Radiology  |   Popeye Townsend, |                   |
|    | Encounter |            |  MD  [address] |                   |
|        |           |            |  St.  [place],   |                   |
|        |           |            | [place] [address]             |                   |
|        |           |            | [phone]         |                   |
|        |           |            | [phone] (Fax)   |                   |
+--------+-----------+------------+----------------------+-------------------+
| / | Surgery   | Radiology  |   Popeye Townsend, | CV EP PPM SYSTEM  |
|    |           |            |  MD  [address] | IMPLANT           |
|        |           |            |  St.  [place],   |                   |
|        |           |            | [place] [address]             |                   |
|        |           |            | [phone]         |                   |
|        |           |            | [phone] (Fax)   |                   |
+--------+-----------+------------+----------------------+-------------------+
| 02/10/ | Clinical  | Cardiology |                      |                   |
|    | Support   |            |                      |                   |
+--------+-----------+------------+----------------------+-------------------+
| / | Office    | Cardiology |   oTnia Wilson,    |                   |
|    | Visit     |            | ARNJESSICA  [address]   |                   |
|        |           |            | St  [place], [place]  |                   |
|        |           |            | [address]  [phone]  |                   |
|        |           |            |  [phone] (Fax)  |                   |
+--------+-----------+------------+----------------------+-------------------+
| / | Off-Site  | Nephrology |   Shanti Marzena  |                   |
 
|    | Visit     |            | M, DO  [address]      |                   |
|        |           |            | [address]      |                   |
|        |           |            | [place]      |                   |
|        |           |            | [address]  [phone]  |                   |
|        |           |            |  [phone] (Fax)  |                   |
+--------+-----------+------------+----------------------+-------------------+
 documented as of this encounter
 
 Procedures
 
 
+---------------------+--------+-------------+----------------------+----------------------+
| Procedure Name      | Priori | Date/Time   | Associated Diagnosis | Comments             |
|                     | ty     |             |                      |                      |
+---------------------+--------+-------------+----------------------+----------------------+
| XR CHEST PA AND     | Routin | 2013  |   Shortness of       |   Results for this   |
| LATERAL             | e      |  1:17 PM    | breath  Cough        | procedure are in the |
|                     |        | PDT         |                      |  results section.    |
+---------------------+--------+-------------+----------------------+----------------------+
| B TYPE NATRIURETIC  | Routin | 2013  |                      |   Results for this   |
| PEPTIDE             | e      | 10:01 AM    |                      | procedure are in the |
|                     |        | PDT         |                      |  results section.    |
+---------------------+--------+-------------+----------------------+----------------------+
| B TYPE NATRIURETIC  | Routin | 2013  |   Shortness of       |   Results for this   |
| PEPTIDE             | e      | 10:01 AM    | breath  Cough        | procedure are in the |
|                     |        | PDT         |                      |  results section.    |
+---------------------+--------+-------------+----------------------+----------------------+
 documented in this encounter
 
 Results
 XR Chest PA and Lateral (2013  1:17 PM PDT)
 
+----------+
| Specimen |
+----------+
|          |
+----------+
 
 
 
+------------------------------------------------------------------------+-----------------+
| Narrative                                                              | Performed At    |
+------------------------------------------------------------------------+-----------------+
|    [facility]      Diagnostic Imaging          |   [place]    |
| Department      [address], [place]      [phone]    | [facility]        |
|                             [   rep [address]]     [   rep [facility]  |
| st zip]     **** Signed ****                                           | - IMAGING       |
| ______________________________________________________________________ |                 |
| ______________________     Patient Name: RAS HARDY   Physician:     |                 |
| OFFRADHA.     : 1945    Age: 67   Sex: F     Unit #: [id number]    |                 |
|   Exam Date: 13     Acct #: [id number]   Location: [facility]         |                 |
| Report #: [id number]                      Page:                         |                 |
|   %(RAD)RES..mtdd.print.filter("pg") of   %(RAD)                       |                 |
| RES..mtdd.print.filter("tpg")                                          |                 |
| ______________________________________________________________________ |                 |
| ______________________     Accession Number: [id number]                |                 |
| CHEST X-RAY               CLINICAL HISTORY:   INCREASED SHORTNESS OF   |                 |
| BREATH AND COUGH.               COMPARISON:   Numerous previous chest  |                 |
| x-rays, most recently 2013.               FINDINGS:   PA and     |                 |
| lateral views of the chest were obtained. Sternotomy wires are         |                 |
 
| present. There is   mild scarring of the bilateral lung bases.         |                 |
| Bilateral lungs are otherwise clear. Heart is enlarged.   There is     |                 |
| atherosclerosis of the aorta. Moderate spondylosis is visualized of    |                 |
| the thoracic spine.               IMPRESSION:       1.   NO ACUTE      |                 |
| FINDINGS.               2.   CARDIOMEGALY.              3.   PREVIOUS  |                 |
| STERNOTOMY.              Dictated Date/Time:   2013 13:17        |                 |
| Transcribed Date/Time:   2013 13:19      :       |                 |
|                         <<Signature on File>>                     |                 |
| -----------------------------------------------------------     Derek  |                 |
| MD Derek13 1063     <Electronically signed by Derek Reed MD>     |                 |
|            Derek Reed MD 13 1317     : Rich  |                 |
| [id number]/17/13 1319               Nena Boykin MD      |                 |
|                                                                        |                 |
+------------------------------------------------------------------------+-----------------+
 
 
 
+----------------------+---------------------+--------------------+----------------+
| Performing           | Address             | City/State/Zipcode | Phone Number   |
| Organization         |                     |                    |                |
+----------------------+---------------------+--------------------+----------------+
|   [facility]     |   [address] | [place]    |   [phone] |
| [facility]  |                     | [address]              |                |
| - IMAGING            |                     |                    |                |
+----------------------+---------------------+--------------------+----------------+
 B Type Natriuretic Peptide (2013 10:01 AM PDT)
 
+-----------+--------------------------+------------+-------------+--------------+
| Component | Value                    | Ref Range  | Performed   | Pathologist  |
|           |                          |            | At          | Signature    |
+-----------+--------------------------+------------+-------------+--------------+
| BNP       | 164 (H)Comment: Testing  | <100 pg/mL | [facility]  |              |
|           | performed on the [facility] |            | [facility]    |              |
|           |  [facility]          |            | MEDICAL     |              |
|           | Analyzer.                |            | CENTER -    |              |
|           |                          |            | LABORATORY  |              |
+-----------+--------------------------+------------+-------------+--------------+
 
 
 
+----------+
| Specimen |
+----------+
|          |
+----------+
 
 
 
+----------------------+--------------------+--------------------+----------------+
| Performing           | Address            | City/State/Zipcode | Phone Number   |
| Organization         |                    |                    |                |
+----------------------+--------------------+--------------------+----------------+
|   [facility]     |   [address] | [place]    |   [phone] |
| [facility]  |                    | [address]              |                |
| - LABORATORY         |                    |                    |                |
+----------------------+--------------------+--------------------+----------------+
|   [facility]     |   [address] | [place]    |                |
| [facility]  |                    | [address]              |                |
| - LABORATORY         |                    |                    |                |
+----------------------+--------------------+--------------------+----------------+
 
 B Type Natriuretic Peptide (2013 10:01 AM PDT)
 
+-----------+--------------------------+------------+-------------+--------------+
| Component | Value                    | Ref Range  | Performed   | Pathologist  |
|           |                          |            | At          | Signature    |
+-----------+--------------------------+------------+-------------+--------------+
| BNP       | 164 (H)Comment: Testing  | <100 pg/mL | [facility]  |              |
|           | performed on the [employer] |            | [facility]    |              |
|           |  [facility]          |            | MEDICAL     |              |
|           | Analyzer.                |            | CENTER -    |              |
|           |                          |            | LABORATORY  |              |
+-----------+--------------------------+------------+-------------+--------------+
 
 
 
+-----------------+
| Specimen        |
+-----------------+
| Blood specimen  |
| (specimen)      |
+-----------------+
 
 
 
+----------------------+--------------------+--------------------+----------------+
| Performing           | Address            | City/State/Zipcode | Phone Number   |
| Organization         |                    |                    |                |
+----------------------+--------------------+--------------------+----------------+
|   [facility]     |   [address] | [place], [place]    |   [phone] |
| [facility]  |                    | [address]              |                |
| - LABORATORY         |                    |                    |                |
+----------------------+--------------------+--------------------+----------------+
|   [facility]     |   [address] | [place], [place]    |                |
| [facility]  |                    | [address]              |                |
| - LABORATORY         |                    |                    |                |
+----------------------+--------------------+--------------------+----------------+
 documented in this encounter
 
 Visit Diagnoses
 
 
+-----------------------+
| Diagnosis             |
+-----------------------+
|   Shortness of breath |
+-----------------------+
|   Cough               |
+-----------------------+
 documented in this encounter

## 2020-01-08 NOTE — XMS
Encounter Summary
  Created on: 2020
 
 Viviana Ricci
 External Reference #: [id number]
 : 47
 Sex: Female
 
 Demographics
 
 
+-----------------------+----------------------+
| Address               | [address]      |
|                       | [place]  [address] |
+-----------------------+----------------------+
| Home Phone            | [phone]      |
+-----------------------+----------------------+
| Preferred Language    | Unknown              |
+-----------------------+----------------------+
| Marital Status        | Single               |
+-----------------------+----------------------+
| Baptism Affiliation | 1009                 |
+-----------------------+----------------------+
| Race                  | Unknown              |
+-----------------------+----------------------+
| Ethnic Group          | Unknown              |
+-----------------------+----------------------+
 
 
 Author
 
 
+--------------+--------------------------------------------+
| Author       | [facility]  |
|              | and [place]                                |
+--------------+--------------------------------------------+
| Organization | [facility]  |
|              | and [place]                                |
+--------------+--------------------------------------------+
| Address      | Unknown                                    |
+--------------+--------------------------------------------+
| Phone        | Unavailable                                |
+--------------+--------------------------------------------+
 
 
 
 Support
 
 
+----------------+--------------+---------------------+-----------------+
| Name           | Relationship | Address             | Phone           |
+----------------+--------------+---------------------+-----------------+
| Ada/Ed Radhames | ECON         | [place]              | [phone] |
|                |              | [place]  |                 |
|                |              |  [id number]              |                 |
+----------------+--------------+---------------------+-----------------+
 
 
 
 
 Care Team Providers
 
 
+-----------------------+------+-------------+
| Care Team Member Name | Role | Phone       |
+-----------------------+------+-------------+
 PCP  | Unavailable |
+-----------------------+------+-------------+
 
 
 
 Reason for Visit
 
 
+-------------------+----------+
| Reason            | Comments |
+-------------------+----------+
| Medication Refill |          |
+-------------------+----------+
 
 
 
 Encounter Details
 
 
+--------+--------+---------------------+----------------------+-------------------+
| Date   | Type   | Department          | Care Team            | Description       |
+--------+--------+---------------------+----------------------+-------------------+
| / | Refill |   [facility]         |   Marzena Moser  | Medication Refill |
|    |        | NEPHROLOGY  [address]   | M, DO  [address]      |                   |
|        |        | [address]   | [address]      |                   |
|        |        | [place], [place]     | [place], [place]      |                   |
|        |        | [address]          | [address]  [phone]  |                   |
|        |        | [phone]        |  [phone] (Fax)  |                   |
+--------+--------+---------------------+----------------------+-------------------+
 
 
 
 Social History
 
 
+--------------+-------+-----------+--------+------+
| Tobacco Use  | Types | Packs/Day | Years  | Date |
|              |       |           | Used   |      |
+--------------+-------+-----------+--------+------+
| Never Smoker |       |           |        |      |
+--------------+-------+-----------+--------+------+
 
 
 
+---------------------+---+---+---+
| Smokeless Tobacco:  |   |   |   |
| Never Used          |   |   |   |
+---------------------+---+---+---+
 
 
 
+---------------------------------------------------------------+
| Comments: some second hand smoke exposure, but fairly minimal |
 
+---------------------------------------------------------------+
 
 
 
+-------------+-------------+---------+----------+
| Alcohol Use | Drinks/Week | oz/Week | Comments |
+-------------+-------------+---------+----------+
| No          |             |         |          |
+-------------+-------------+---------+----------+
 
 
 
+------------------+---------------+
| Sex Assigned at  | Date Recorded |
| Birth            |               |
+------------------+---------------+
| Not on file      |               |
+------------------+---------------+
 
 
 
+----------------+-------------+-------------+
| Job Start Date | Occupation  | Industry    |
+----------------+-------------+-------------+
| Not on file    | Not on file | Not on file |
+----------------+-------------+-------------+
 
 
 
+----------------+--------------+------------+
| Travel History | Travel Start | Travel End |
+----------------+--------------+------------+
 
 
 
+-------------------------------------+
| No recent travel history available. |
+-------------------------------------+
 documented as of this encounter
 
 Plan of Treatment
 
 
+--------+-----------+------------+----------------------+-------------------+
| Date   | Type      | Specialty  | Care Team            | Description       |
+--------+-----------+------------+----------------------+-------------------+
| / | Office    | Cardiology |   HellalfredoTonia,    |                   |
|    | Visit     |            | ARNP  [address]   |                   |
|        |           |            | St  [place], [place]  |                   |
|        |           |            | [address]  [phone]  |                   |
|        |           |            |  [phone] (Fax)  |                   |
+--------+-----------+------------+----------------------+-------------------+
| / | Hospital  | Radiology  |   Popeye Townsend, |                   |
|    | Encounter |            |  MD  [address] |                   |
|        |           |            |  St.  [place],   |                   |
|        |           |            | [place] [address]             |                   |
|        |           |            | [phone]         |                   |
|        |           |            | [phone] (Fax)   |                   |
+--------+-----------+------------+----------------------+-------------------+
| / | Surgery   | Radiology  |   Popeye Townsend, | CV EP PPM SYSTEM  |
 
|    |           |            |  MD  [address] | IMPLANT           |
|        |           |            |  St.  [place],   |                   |
|        |           |            | [place] [address]             |                   |
|        |           |            | [phone]         |                   |
|        |           |            | [phone] (Fax)   |                   |
+--------+-----------+------------+----------------------+-------------------+
| 02/10/ | Clinical  | Cardiology |                      |                   |
|    | Support   |            |                      |                   |
+--------+-----------+------------+----------------------+-------------------+
| / | Office    | Cardiology |   Tonia Wilson,    |                   |
|    | Visit     |            | ARNP  [address]   |                   |
|        |           |            | [place]  |                   |
|        |           |            | [address]  [phone]  |                   |
|        |           |            |  [phone] (Fax)  |                   |
+--------+-----------+------------+----------------------+-------------------+
| / | Off-Site  | Nephrology |   Marzena Moser  |                   |
|    | Visit     |            | DO ETIENNE  [address] [place] [address]      |                   |
|        |           |            | [address]      |                   |
|        |           |            | [place]      |                   |
|        |           |            | [address]  [phone]  |                   |
|        |           |            |  [phone] (Fax)  |                   |
+--------+-----------+------------+----------------------+-------------------+
 documented as of this encounter
 
 Visit Diagnoses
 
 
+------------------------------------------------------------------------------------------+
| Diagnosis                                                                                |
+------------------------------------------------------------------------------------------+
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
| stage IV, or unspecified(403.90) - Primary  Unspecified hypertensive kidney disease with |
|  chronic kidney disease stage I through stage IV, or unspecified                         |
+------------------------------------------------------------------------------------------+
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
| membranous glomerulonephritis                                                            |
+------------------------------------------------------------------------------------------+
|   Hyperlipidemia  Other and unspecified hyperlipidemia                                   |
+------------------------------------------------------------------------------------------+
 documented in this encounter

## 2020-01-08 NOTE — XMS
Encounter Summary
  Created on: 2020
 
 Viviana Ricci
 External Reference #: [id number]
 : 47
 Sex: Female
 
 Demographics
 
 
+-----------------------+----------------------+
| Address               | [address]      |
|                       | [place]  [address] |
+-----------------------+----------------------+
| Home Phone            | [phone]      |
+-----------------------+----------------------+
| Preferred Language    | Unknown              |
+-----------------------+----------------------+
| Marital Status        | Single               |
+-----------------------+----------------------+
| Bahai Affiliation | [id number]                 |
+-----------------------+----------------------+
| Race                  | Unknown              |
+-----------------------+----------------------+
| Ethnic Group          | Unknown              |
+-----------------------+----------------------+
 
 
 Author
 
 
+--------------+--------------------------------------------+
| Author       | [facility]  |
|              | and [place]                                |
+--------------+--------------------------------------------+
| Organization | [facility]  |
|              | and [place]                                |
+--------------+--------------------------------------------+
| Address      | Unknown                                    |
+--------------+--------------------------------------------+
| Phone        | Unavailable                                |
+--------------+--------------------------------------------+
 
 
 
 Support
 
 
+----------------+--------------+---------------------+-----------------+
| Name           | Relationship | Address             | Phone           |
+----------------+--------------+---------------------+-----------------+
| Ada/Ed Radhames | ECON         | [address]              | [phone] |
|                |              | [place]  |                 |
|                |              |  [address]              |                 |
+----------------+--------------+---------------------+-----------------+
 
 
 
 
 Care Team Providers
 
 
+-----------------------+------+-------------+
| Care Team Member Name | Role | Phone       |
+-----------------------+------+-------------+
 PCP  | Unavailable |
+-----------------------+------+-------------+
 
 
 
 Encounter Details
 
 
+--------+----------+----------------------+----------------------+-------------+
| Date   | Type     | Department           | Care Team            | Description |
+--------+----------+----------------------+----------------------+-------------+
| / | Abstract |   [facility]  |   Marzena Moser  |             |
|    |          | Care  [address]  | M, DO  [address]      |             |
|        |          | [address]  [place],   | [address]      |             |
|        |          | [place] [address]        | [place]      |             |
|        |          | [phone]         | [address]  [phone]  |             |
|        |          |                      |  [phone] (Fax)  |             |
+--------+----------+----------------------+----------------------+-------------+
 
 
 
 Social History
 
 
+--------------+-------+-----------+--------+------+
| Tobacco Use  | Types | Packs/Day | Years  | Date |
|              |       |           | Used   |      |
+--------------+-------+-----------+--------+------+
| Never Smoker |       |           |        |      |
+--------------+-------+-----------+--------+------+
 
 
 
+---------------------+---+---+---+
| Smokeless Tobacco:  |   |   |   |
| Never Used          |   |   |   |
+---------------------+---+---+---+
 
 
 
+-------------+-------------+---------+----------+
| Alcohol Use | Drinks/Week | oz/Week | Comments |
+-------------+-------------+---------+----------+
| No          |             |         |          |
+-------------+-------------+---------+----------+
 
 
 
+------------------+---------------+
| Sex Assigned at  | Date Recorded |
| Birth            |               |
+------------------+---------------+
| Not on file      |               |
 
+------------------+---------------+
 
 
 
+----------------+-------------+-------------+
| Job Start Date | Occupation  | Industry    |
+----------------+-------------+-------------+
| Not on file    | Not on file | Not on file |
+----------------+-------------+-------------+
 
 
 
+----------------+--------------+------------+
| Travel History | Travel Start | Travel End |
+----------------+--------------+------------+
 
 
 
+-------------------------------------+
| No recent travel history available. |
+-------------------------------------+
 documented as of this encounter
 
 Plan of Treatment
 
 
+--------+-----------+------------+----------------------+-------------------+
| Date   | Type      | Specialty  | Care Team            | Description       |
+--------+-----------+------------+----------------------+-------------------+
| / | Office    | Cardiology |   Tonia Wilson,    |                   |
|    | Visit     |            | ARNP  [address]   |                   |
|        |           |            | St [place]  |                   |
|        |           |            | [address] [phone]  |                   |
|        |           |            |  [phone] (Fax)  |                   |
+--------+-----------+------------+----------------------+-------------------+
| / | Hospital  | Radiology  |   Popeye Townsend, |                   |
|    | Encounter |            |  MD  [address] |                   |
|        |           |            |  [place]   |                   |
|        |           |            | [place] [address]             |                   |
|        |           |            | [phone]         |                   |
|        |           |            | [phone] (Fax)   |                   |
+--------+-----------+------------+----------------------+-------------------+
| / | Surgery   | Radiology  |   CaryYinmarissa, | CV EP PPM SYSTEM  |
2020   |           |            |  MD  [address] | IMPLANT           |
|        |           |            |  [place]. [place],   |                   |
|        |           |            | [place] [address]             |                   |
|        |           |            | [phone]         |                   |
|        |           |            | [phone] (Fax)   |                   |
+--------+-----------+------------+----------------------+-------------------+
| 02/10/ | Clinical  | Cardiology |                      |                   |
|    | Support   |            |                      |                   |
+--------+-----------+------------+----------------------+-------------------+
| / | Office    | Cardiology |   Tonia Wilson,    |                   |
2020   | Visit     |            | BELKIS  [address]   |                   |
|        |           |            |   [place]  |                   |
|        |           |            | [address]  [phone]  |                   |
|        |           |            |  [phone] (Fax)  |                   |
+--------+-----------+------------+----------------------+-------------------+
| / | Off-Site  | Nephrology |   Marzena Moser  |                   |
2020   | Visit     |            | DO [address]      |                   |
 
|        |           |            | [address]      |                   |
|        |           |            | [place]      |                   |
|        |           |            | [address]  [phone]  |                   |
|        |           |            |  [phone] (Fax)  |                   |
+--------+-----------+------------+----------------------+-------------------+
 documented as of this encounter
 
 Procedures
 
 
+---------------------+--------+------------+----------------------+----------------------+
| Procedure Name      | Priori | Date/Time  | Associated Diagnosis | Comments             |
|                     | ty     |            |                      |                      |
+---------------------+--------+------------+----------------------+----------------------+
| TACROLIMUS ()  | Routin | 2013 |                      |   Results for this   |
| TROUGH              | e      |            |                      | procedure are in the |
|                     |        |            |                      |  results section.    |
+---------------------+--------+------------+----------------------+----------------------+
 documented in this encounter
 
 Results
 Tacrolimus () Level (2013)
 
+-------------+-------+-----------+------------+--------------+
| Component   | Value | Ref Range | Performed  | Pathologist  |
|             |       |           | At         | Signature    |
+-------------+-------+-----------+------------+--------------+
| Tacrolimus  | 9.2   |           |            |              |
| Level       |       |           |            |              |
+-------------+-------+-----------+------------+--------------+
 
 
 
+-----------------+
| Specimen        |
+-----------------+
| Blood specimen  |
| (specimen)      |
+-----------------+
 documented in this encounter
 
 Visit Diagnoses
 Not on filedocumented in this encounter

## 2020-01-08 NOTE — XMS
Encounter Summary
  Created on: 2020
 
 Viviana Ricci
 External Reference #: [id number]
 : 47
 Sex: Female
 
 Demographics
 
 
+-----------------------+----------------------+
| Address               | [address]      |
|                       | [place]  [address] |
+-----------------------+----------------------+
| Home Phone            | [phone]      |
+-----------------------+----------------------+
| Preferred Language    | Unknown              |
+-----------------------+----------------------+
| Marital Status        | Single               |
+-----------------------+----------------------+
| Restoration Affiliation | [id number]                 |
+-----------------------+----------------------+
| Race                  | Unknown              |
+-----------------------+----------------------+
| Ethnic Group          | Unknown              |
+-----------------------+----------------------+
 
 
 Author
 
 
+--------------+--------------------------------------------+
| Author       | [facility]  |
|              | and [place]                                |
+--------------+--------------------------------------------+
| Organization | [facility]  |
|              | and [place]                                |
+--------------+--------------------------------------------+
| Address      | Unknown                                    |
+--------------+--------------------------------------------+
| Phone        | Unavailable                                |
+--------------+--------------------------------------------+
 
 
 
 Support
 
 
+----------------+--------------+---------------------+-----------------+
| Name           | Relationship | Address             | Phone           |
+----------------+--------------+---------------------+-----------------+
| Ada/Ed Radhames | ECON         | [address]              | [phone] |
|                |              | [place]  |                 |
|                |              |  [address]              |                 |
+----------------+--------------+---------------------+-----------------+
 
 
 
 
 Care Team Providers
 
 
+-----------------------+------+-------------+
| Care Team Member Name | Role | Phone       |
+-----------------------+------+-------------+
 PCP  | Unavailable |
+-----------------------+------+-------------+
 
 
 
 Encounter Details
 
 
+--------+----------+---------------------+----------------------+-------------+
| Date   | Type     | Department          | Care Team            | Description |
+--------+----------+---------------------+----------------------+-------------+
| / | Abstract |   [facility] [place]         |   Marzena Moser  |             |
|    |          | NEPHROLOGY  [address]   | M, DO  [address]      |             |
|        |          | [address]   | [address]      |             |
|        |          | [place], [place]     | [place]      |             |
|        |          | [address]          | [address]  [phone]  |             |
|        |          | [phone]        |  [phone] (Fax)  |             |
+--------+----------+---------------------+----------------------+-------------+
 
 
 
 Social History
 
 
+--------------+-------+-----------+--------+------+
| Tobacco Use  | Types | Packs/Day | Years  | Date |
|              |       |           | Used   |      |
+--------------+-------+-----------+--------+------+
| Never Smoker |       |           |        |      |
+--------------+-------+-----------+--------+------+
 
 
 
+---------------------+---+---+---+
| Smokeless Tobacco:  |   |   |   |
| Never Used          |   |   |   |
+---------------------+---+---+---+
 
 
 
+---------------------------------------------------------------+
| Comments: some second hand smoke exposure, but fairly minimal |
+---------------------------------------------------------------+
 
 
 
+-------------+-------------+---------+----------+
| Alcohol Use | Drinks/Week | oz/Week | Comments |
+-------------+-------------+---------+----------+
| No          |             |         |          |
+-------------+-------------+---------+----------+
 
 
 
 
+------------------+---------------+
| Sex Assigned at  | Date Recorded |
| Birth            |               |
+------------------+---------------+
| Not on file      |               |
+------------------+---------------+
 
 
 
+----------------+-------------+-------------+
| Job Start Date | Occupation  | Industry    |
+----------------+-------------+-------------+
| Not on file    | Not on file | Not on file |
+----------------+-------------+-------------+
 
 
 
+----------------+--------------+------------+
| Travel History | Travel Start | Travel End |
+----------------+--------------+------------+
 
 
 
+-------------------------------------+
| No recent travel history available. |
+-------------------------------------+
 documented as of this encounter
 
 Plan of Treatment
 
 
+--------+-----------+------------+----------------------+-------------------+
| Date   | Type      | Specialty  | Care Team            | Description       |
+--------+-----------+------------+----------------------+-------------------+
| / | Office    | Cardiology |   Tonia Wilson,    |                   |
|    | Visit     |            | ARNJESSICA  [address]   |                   |
|        |           |            | [place]  |                   |
|        |           |            | [address]  [phone]  |                   |
|        |           |            |  [phone] (Fax)  |                   |
+--------+-----------+------------+----------------------+-------------------+
| / | Hospital  | Radiology  |   Popeye Townsend, |                   |
|    | Encounter |            |  MD  [address] |                   |
|        |           |            |  St.  [place],   |                   |
|        |           |            | [place] [address]             |                   |
|        |           |            | [phone]         |                   |
|        |           |            | [phone] (Fax)   |                   |
+--------+-----------+------------+----------------------+-------------------+
| / | Surgery   | Radiology  |   Popeye Townesnd, | CV EP PPM SYSTEM  |
|    |           |            |  MD  [address] | IMPLANT           |
|        |           |            |  St.  [place],   |                   |
|        |           |            | [place] [address]             |                   |
|        |           |            | [phone]         |                   |
|        |           |            | [phone] (Fax)   |                   |
+--------+-----------+------------+----------------------+-------------------+
| 02/10/ | Clinical  | Cardiology |                      |                   |
|    | Support   |            |                      |                   |
+--------+-----------+------------+----------------------+-------------------+
| / | Office    | Cardiology |   Toina Wilson,    |                   |
|    | Visit     |            | ARNP  [address]   |                   |
 
|        |           |            | St  [place], [place]  |                   |
|        |           |            | [address]  [phone]  |                   |
|        |           |            |  [phone] (Fax)  |                   |
+--------+-----------+------------+----------------------+-------------------+
| / | Off-Site  | Nephrology |   Marzena Moser  |                   |
|    | Visit     |            | DO ETIENNE  [address] [place] [address]      |                   |
|        |           |            | [address]      |                   |
|        |           |            | [place]      |                   |
|        |           |            | [address]  [phone]  |                   |
|        |           |            |  [phone] (Fax)  |                   |
+--------+-----------+------------+----------------------+-------------------+
 documented as of this encounter
 
 Procedures
 
 
+--------------------+--------+------------+----------------------+----------------------+
| Procedure Name     | Priori | Date/Time  | Associated Diagnosis | Comments             |
|                    | ty     |            |                      |                      |
+--------------------+--------+------------+----------------------+----------------------+
| EXTERNAL LAB:      | Routin | 2018 |                      |   Results for this   |
| TACROLIMUS LEVEL,  | e      |            |                      | procedure are in the |
| [facility]               |        |            |                      |  results section.    |
+--------------------+--------+------------+----------------------+----------------------+
 documented in this encounter
 
 Results
 External Lab: Tacrolimus Level, [facility] (2018)
 
+-------------+-------+-----------+------------+--------------+
| Component   | Value | Ref Range | Performed  | Pathologist  |
|             |       |           | At         | Signature    |
+-------------+-------+-----------+------------+--------------+
| Tacrolimus, | 9.2   |           |            |              |
|  [facility],      |       |           |            |              |
| External    |       |           |            |              |
+-------------+-------+-----------+------------+--------------+
 
 
 
+----------+
| Specimen |
+----------+
|          |
+----------+
 documented in this encounter
 
 Visit Diagnoses
 Not on filedocumented in this encounter

## 2020-01-08 NOTE — XMS
Encounter Summary
  Created on: 2020
 
 Viviana Ricci
 External Reference #: [id number]
 : 47
 Sex: Female
 
 Demographics
 
 
+-----------------------+----------------------+
| Address               | [address]      |
|                       | [place]  [address] |
+-----------------------+----------------------+
| Home Phone            | [phone]      |
+-----------------------+----------------------+
| Preferred Language    | Unknown              |
+-----------------------+----------------------+
| Marital Status        | Single               |
+-----------------------+----------------------+
| Confucianist Affiliation | [id number]                 |
+-----------------------+----------------------+
| Race                  | Unknown              |
+-----------------------+----------------------+
| Ethnic Group          | Unknown              |
+-----------------------+----------------------+
 
 
 Author
 
 
+--------------+--------------------------------------------+
| Author       | [facility]  |
|              | and [place]                                |
+--------------+--------------------------------------------+
| Organization | [facility]  |
|              | and [place]                                |
+--------------+--------------------------------------------+
| Address      | Unknown                                    |
+--------------+--------------------------------------------+
| Phone        | Unavailable                                |
+--------------+--------------------------------------------+
 
 
 
 Support
 
 
+----------------+--------------+---------------------+-----------------+
| Name           | Relationship | Address             | Phone           |
+----------------+--------------+---------------------+-----------------+
| Ada/Ed Radhames | ECON         | [address]              | [phone] |
|                |              | [place]  |                 |
|                |              |  [address]              |                 |
+----------------+--------------+---------------------+-----------------+
 
 
 
 
 Care Team Providers
 
 
+-----------------------+------+-------------+
| Care Team Member Name | Role | Phone       |
+-----------------------+------+-------------+
 PCP  | Unavailable |
+-----------------------+------+-------------+
 
 
 
 Encounter Details
 
 
+--------+-----------+----------------------+----------------------+-------------+
| Date   | Type      | Department           | Care Team            | Description |
+--------+-----------+----------------------+----------------------+-------------+
| / | [facility]  |   [facility] |   Marzena Moser  |             |
|    | Encounter |  [facility]  | M, DO  [address]      |             |
|        |           |  [address] | [address]      |             |
|        |           |  [place]           | [place]      |             |
|        |           | [address]           | [address]  [phone]  |             |
|        |           | [phone]         |  [phone] (Fax)  |             |
+--------+-----------+----------------------+----------------------+-------------+
 
 
 
 Social History
 
 
+----------------+-------+-----------+--------+------+
| Tobacco Use    | Types | Packs/Day | Years  | Date |
|                |       |           | Used   |      |
+----------------+-------+-----------+--------+------+
| Never Assessed |       |           |        |      |
+----------------+-------+-----------+--------+------+
 
 
 
+------------------+---------------+
| Sex Assigned at  | Date Recorded |
| Birth            |               |
+------------------+---------------+
| Not on file      |               |
+------------------+---------------+
 
 
 
+----------------+-------------+-------------+
| Job Start Date | Occupation  | Industry    |
+----------------+-------------+-------------+
| Not on file    | Not on file | Not on file |
+----------------+-------------+-------------+
 
 
 
+----------------+--------------+------------+
| Travel History | Travel Start | Travel End |
+----------------+--------------+------------+
 
 
 
 
+-------------------------------------+
| No recent travel history available. |
+-------------------------------------+
 documented as of this encounter
 
 Plan of Treatment
 
 
+--------+-----------+------------+----------------------+-------------------+
| Date   | Type      | Specialty  | Care Team            | Description       |
+--------+-----------+------------+----------------------+-------------------+
| / | Office    | Cardiology |   Tonia Wilson,    |                   |
| 2020   | Visit     |            | [place]  [address]   |                   |
|        |           |            | St [place], [place]  |                   |
|        |           |            | [address]  [phone]  |                   |
|        |           |            |  [phone] (Fax)  |                   |
+--------+-----------+------------+----------------------+-------------------+
| / | Hospital  | Radiology  |   Popeye Townsend, |                   |
|    | Encounter |            |  MD [address] |                   |
|        |           |            |  [place]. [place],   |                   |
|        |           |            | [place] [address]             |                   |
|        |           |            | [phone]         |                   |
|        |           |            | [phone] (Fax)   |                   |
+--------+-----------+------------+----------------------+-------------------+
| / | Surgery   | Radiology  |   Popeye Townsend, | CV EP PPM SYSTEM  |
|    |           |            |  MD  [address] | IMPLANT           |
|        |           |            |  St. [place],   |                   |
|        |           |            | [place] [address]             |                   |
|        |           |            | [phone]         |                   |
|        |           |            | [phone] (Fax)   |                   |
+--------+-----------+------------+----------------------+-------------------+
| 02/10/ | Clinical  | Cardiology |                      |                   |
|    | Support   |            |                      |                   |
+--------+-----------+------------+----------------------+-------------------+
| / | Office    | Cardiology |   Tonia Wilson,    |                   |
|    | Visit     |            | [place]  [address]   |                   |
|        |           |            | [place]  |                   |
|        |           |            | [address]  [phone]  |                   |
|        |           |            |  [phone] (Fax)  |                   |
+--------+-----------+------------+----------------------+-------------------+
| / | Off-Site  | Nephrology |   Marzena Moser  |                   |
|    | Visit     |            | M, DO  [address]      |                   |
|        |           |            | [address]      |                   |
|        |           |            | [place]      |                   |
|        |           |            | [address] [phone]  |                   |
|        |           |            |  [phone] (Fax)  |                   |
+--------+-----------+------------+----------------------+-------------------+
 documented as of this encounter
 
 Visit Diagnoses
 Not on filedocumented in this encounter

## 2020-01-08 NOTE — XMS
Encounter Summary
  Created on: 2020
 
 Viviana Ricci
 External Reference #: [id number]
 : 47
 Sex: Female
 
 Demographics
 
 
+-----------------------+----------------------+
| Address               | [address]      |
|                       | [place]  [address] |
+-----------------------+----------------------+
| Home Phone            | [phone]      |
+-----------------------+----------------------+
| Preferred Language    | Unknown              |
+-----------------------+----------------------+
| Marital Status        | Single               |
+-----------------------+----------------------+
| Islam Affiliation | [id number]                 |
+-----------------------+----------------------+
| Race                  | Unknown              |
+-----------------------+----------------------+
| Ethnic Group          | Unknown              |
+-----------------------+----------------------+
 
 
 Author
 
 
+--------------+--------------------------------------------+
| Author       | [facility]  |
|              | and [place]                                |
+--------------+--------------------------------------------+
| Organization | [facility]  |
|              | and [place]                                |
+--------------+--------------------------------------------+
| Address      | Unknown                                    |
+--------------+--------------------------------------------+
| Phone        | Unavailable                                |
+--------------+--------------------------------------------+
 
 
 
 Support
 
 
+----------------+--------------+---------------------+-----------------+
| Name           | Relationship | Address             | Phone           |
+----------------+--------------+---------------------+-----------------+
| Ada/Ed Radhames | ECON         | [address]              | [phone] |
|                |              | [place]  |                 |
|                |              |  [address]              |                 |
+----------------+--------------+---------------------+-----------------+
 
 
 
 
 Care Team Providers
 
 
+-----------------------+------+-------------+
| Care Team Member Name | Role | Phone       |
+-----------------------+------+-------------+
 PCP  | Unavailable |
+-----------------------+------+-------------+
 
 
 
 Encounter Details
 
 
+--------+-------------+---------------------+----------------------+-------------+
| Date   | Type        | Department          | Care Team            | Description |
+--------+-------------+---------------------+----------------------+-------------+
| / | Orders Only |   [facility]         |   Marzena Moser  |             |
|    |             | NEPHROLOGY  [address]   | M, DO  [address]      |             |
|        |             | [address]   | [address]      |             |
|        |             | [place]     | [place]      |             |
|        |             | [address]          | [address]  [phone]  |             |
|        |             | [phone]        |  [phone] (Fax)  |             |
+--------+-------------+---------------------+----------------------+-------------+
 
 
 
 Social History
 
 
+--------------+-------+-----------+--------+------+
| Tobacco Use  | Types | Packs/Day | Years  | Date |
|              |       |           | Used   |      |
+--------------+-------+-----------+--------+------+
| Never Smoker |       |           |        |      |
+--------------+-------+-----------+--------+------+
 
 
 
+---------------------+---+---+---+
| Smokeless Tobacco:  |   |   |   |
| Never Used          |   |   |   |
+---------------------+---+---+---+
 
 
 
+---------------------------------------------------------------+
| Comments: some second hand smoke exposure, but fairly minimal |
+---------------------------------------------------------------+
 
 
 
+-------------+-------------+---------+----------+
| Alcohol Use | Drinks/Week | oz/Week | Comments |
+-------------+-------------+---------+----------+
| No          |             |         |          |
+-------------+-------------+---------+----------+
 
 
 
 
+------------------+---------------+
| Sex Assigned at  | Date Recorded |
| Birth            |               |
+------------------+---------------+
| Not on file      |               |
+------------------+---------------+
 
 
 
+----------------+-------------+-------------+
| Job Start Date | Occupation  | Industry    |
+----------------+-------------+-------------+
| Not on file    | Not on file | Not on file |
+----------------+-------------+-------------+
 
 
 
+----------------+--------------+------------+
| Travel History | Travel Start | Travel End |
+----------------+--------------+------------+
 
 
 
+-------------------------------------+
| No recent travel history available. |
+-------------------------------------+
 documented as of this encounter
 
 Plan of Treatment
 
 
+--------+-----------+------------+----------------------+-------------------+
| Date   | Type      | Specialty  | Care Team            | Description       |
+--------+-----------+------------+----------------------+-------------------+
| / | Office    | Cardiology |   Tonia Wilson,    |                   |
|    | Visit     |            | [address]   |                   |
|        |           |            | [place]  |                   |
|        |           |            | [id number] [phone]  |                   |
|        |           |            |  [phone] (Fax)  |                   |
+--------+-----------+------------+----------------------+-------------------+
| / | Hospital  | Radiology  |   Popeye Townsend, |                   |
|    | Encounter |            |  MD [address] |                   |
|        |           |            |  St.  [place],   |                   |
|        |           |            | [place] [address]             |                   |
|        |           |            | [phone]         |                   |
|        |           |            | [phone] (Fax)   |                   |
+--------+-----------+------------+----------------------+-------------------+
| / | Surgery   | Radiology  |   Popeye Townsend, | CV EP PPM SYSTEM  |
|    |           |            |  MD  [address] | IMPLANT           |
|        |           |            |  St.  [place],   |                   |
|        |           |            | [place] [address]             |                   |
|        |           |            | [phone]         |                   |
|        |           |            | [phone] (Fax)   |                   |
+--------+-----------+------------+----------------------+-------------------+
| 02/10/ | Clinical  | Cardiology |                      |                   |
|    | Support   |            |                      |                   |
+--------+-----------+------------+----------------------+-------------------+
| / | Office    | Cardiology |   Tonia Wilson,    |                   |
|    | Visit     |            | [address]   |                   |
 
|        |           |            | St  [place], [place]  |                   |
|        |           |            | [address] [phone]  |                   |
|        |           |            |  [phone] (Fax)  |                   |
+--------+-----------+------------+----------------------+-------------------+
| / | Off-Site  | Nephrology |   Marzena Moser  |                   |
|    | Visit     |            | DO [address]      |                   |
|        |           |            | [address]      |                   |
|        |           |            | [place]      |                   |
|        |           |            | [address] [phone]  |                   |
|        |           |            |  [phone] (Fax)  |                   |
+--------+-----------+------------+----------------------+-------------------+
 documented as of this encounter
 
 Visit Diagnoses
 Not on filedocumented in this encounter

## 2020-01-08 NOTE — XMS
Encounter Summary
  Created on: 2020
 
 Viviana Ricci
 External Reference #: [id number]
 : 47
 Sex: Female
 
 Demographics
 
 
+-----------------------+----------------------+
| Address               | [address]      |
|                       | [place]  [address] |
+-----------------------+----------------------+
| Home Phone            | [phone]      |
+-----------------------+----------------------+
| Preferred Language    | Unknown              |
+-----------------------+----------------------+
| Marital Status        | Single               |
+-----------------------+----------------------+
| Spiritism Affiliation | [id number]                 |
+-----------------------+----------------------+
| Race                  | Unknown              |
+-----------------------+----------------------+
| Ethnic Group          | Unknown              |
+-----------------------+----------------------+
 
 
 Author
 
 
+--------------+--------------------------------------------+
| Author       | [facility] [place]  |
|              | and [place]                                |
+--------------+--------------------------------------------+
| Organization | [facility] [place]  |
|              | and [place]                                |
+--------------+--------------------------------------------+
| Address      | Unknown                                    |
+--------------+--------------------------------------------+
| Phone        | Unavailable                                |
+--------------+--------------------------------------------+
 
 
 
 Support
 
 
+----------------+--------------+---------------------+-----------------+
| Name           | Relationship | Address             | Phone           |
+----------------+--------------+---------------------+-----------------+
| Ada/Ed Radhames | ECON         | [address]              | [phone] |
|                |              | [place]  |                 |
|                |              |  [id number]              |                 |
+----------------+--------------+---------------------+-----------------+
 
 
 
 
 Care Team Providers
 
 
+-----------------------+------+-------------+
| Care Team Member Name | Role | Phone       |
+-----------------------+------+-------------+
 PCP  | Unavailable |
+-----------------------+------+-------------+
 
 
 
 Reason for Visit
 
 
+----------------+----------+
| Reason         | Comments |
+----------------+----------+
| Urinary Tract  |          |
| Infection      |          |
+----------------+----------+
 
 
 
 Encounter Details
 
 
+--------+-----------+---------------------+----------------------+----------------+
| Date   | Type      | Department          | Care Team            | Description    |
+--------+-----------+---------------------+----------------------+----------------+
| / | Telephone |   [facility]         |   Marzena Moser  | Urinary Tract  |
| 2018   |           | NEPHROLOGY  [address]   | M, DO  [address]      | Infection      |
|        |           | [address]   | [address]      |                |
|        |           | [place], [place]     | [place], [place]      |                |
|        |           | [address]          | [address]  [phone]  |                |
|        |           | [phone]        |  [phone] (Fax)  |                |
+--------+-----------+---------------------+----------------------+----------------+
 
 
 
 Social History
 
 
+--------------+-------+-----------+--------+------+
| Tobacco Use  | Types | Packs/Day | Years  | Date |
|              |       |           | Used   |      |
+--------------+-------+-----------+--------+------+
| Never Smoker |       |           |        |      |
+--------------+-------+-----------+--------+------+
 
 
 
+---------------------+---+---+---+
| Smokeless Tobacco:  |   |   |   |
| Never Used          |   |   |   |
+---------------------+---+---+---+
 
 
 
+---------------------------------------------------------------+
 
| Comments: some second hand smoke exposure, but fairly minimal |
+---------------------------------------------------------------+
 
 
 
+-------------+-------------+---------+----------+
| Alcohol Use | Drinks/Week | oz/Week | Comments |
+-------------+-------------+---------+----------+
| No          |             |         |          |
+-------------+-------------+---------+----------+
 
 
 
+------------------+---------------+
| Sex Assigned at  | Date Recorded |
| Birth            |               |
+------------------+---------------+
| Not on file      |               |
+------------------+---------------+
 
 
 
+----------------+-------------+-------------+
| Job Start Date | Occupation  | Industry    |
+----------------+-------------+-------------+
| Not on file    | Not on file | Not on file |
+----------------+-------------+-------------+
 
 
 
+----------------+--------------+------------+
| Travel History | Travel Start | Travel End |
+----------------+--------------+------------+
 
 
 
+-------------------------------------+
| No recent travel history available. |
+-------------------------------------+
 documented as of this encounter
 
 Plan of Treatment
 
 
+--------+-----------+------------+----------------------+-------------------+
| Date   | Type      | Specialty  | Care Team            | Description       |
+--------+-----------+------------+----------------------+-------------------+
| / | Office    | Cardiology |   Tonia Wilson,    |                   |
|    | Visit     |            | ARNJESSICA  [address]   |                   |
|        |           |            | St  [facility] [place]  |                   |
|        |           |            | [address]  [phone]  |                   |
|        |           |            |  [phone] (Fax)  |                   |
+--------+-----------+------------+----------------------+-------------------+
| / | Hospital  | Radiology  |   Popeye Townsend, |                   |
|    | Encounter |            |  MD  [address] |                   |
|        |           |            |  [place],   |                   |
|        |           |            | [place] [address]             |                   |
|        |           |            | [phone]         |                   |
|        |           |            | [phone] (Fax)   |                   |
+--------+-----------+------------+----------------------+-------------------+
 
| / | Surgery   | Radiology  |   Popeye Townsend, | CV EP PPM SYSTEM  |
|    |           |            |  MD  [address] | IMPLANT           |
|        |           |            |  St.  [place],   |                   |
|        |           |            | [place] [address]             |                   |
|        |           |            | [phone]         |                   |
|        |           |            | [phone] (Fax)   |                   |
+--------+-----------+------------+----------------------+-------------------+
| 02/10/ | Clinical  | Cardiology |                      |                   |
|    | Support   |            |                      |                   |
+--------+-----------+------------+----------------------+-------------------+
| / | Office    | Cardiology |   Tonia Wilson,    |                   |
|    | Visit     |            | BELKIS  [address]   |                   |
|        |           |            | [place]  |                   |
|        |           |            | [address]  [phone]  |                   |
|        |           |            |  [phone] (Fax)  |                   |
+--------+-----------+------------+----------------------+-------------------+
| / | Off-Site  | Nephrology |   Marzena Moser  |                   |
|    | Visit     |            | DO ETIENNE  [address] [place] [address]      |                   |
|        |           |            | [address]      |                   |
|        |           |            | [place]      |                   |
|        |           |            | [id number] [phone]  |                   |
|        |           |            |  [phone] (Fax)  |                   |
+--------+-----------+------------+----------------------+-------------------+
 documented as of this encounter
 
 Visit Diagnoses
 
 
+-------------------------------------------------------------------------------------+
| Diagnosis                                                                           |
+-------------------------------------------------------------------------------------+
|   UTI (lower urinary tract infection) - Primary  Urinary tract infection, site not  |
| specified                                                                           |
+-------------------------------------------------------------------------------------+
 documented in this encounter

## 2020-01-08 NOTE — XMS
Encounter Summary
  Created on: 2020
 
 Viviana Ricci
 External Reference #: [id number]
 : 47
 Sex: Female
 
 Demographics
 
 
+-----------------------+----------------------+
| Address               | [address]      |
|                       | [place]  [address] |
+-----------------------+----------------------+
| Home Phone            | [phone]      |
+-----------------------+----------------------+
| Preferred Language    | Unknown              |
+-----------------------+----------------------+
| Marital Status        | Single               |
+-----------------------+----------------------+
| Anabaptism Affiliation | [id number]                 |
+-----------------------+----------------------+
| Race                  | Unknown              |
+-----------------------+----------------------+
| Ethnic Group          | Unknown              |
+-----------------------+----------------------+
 
 
 Author
 
 
+--------------+--------------------------------------------+
| Author       | [facility] [place]  |
|              | and [place]                                |
+--------------+--------------------------------------------+
| Organization | [facility] [place]  |
|              | and [place]                                |
+--------------+--------------------------------------------+
| Address      | Unknown                                    |
+--------------+--------------------------------------------+
| Phone        | Unavailable                                |
+--------------+--------------------------------------------+
 
 
 
 Support
 
 
+----------------+--------------+---------------------+-----------------+
| Name           | Relationship | Address             | Phone           |
+----------------+--------------+---------------------+-----------------+
| Ada/Ed Radhames | ECON         | [address]              | [phone] |
|                |              | [place]  |                 |
|                |              |  [address]              |                 |
+----------------+--------------+---------------------+-----------------+
 
 
 
 
 Care Team Providers
 
 
+------------------------+------+-----------------+
| Care Team Member Name  | Role | Phone           |
+------------------------+------+-----------------+
| Marzena Moser DO | PCP  | [phone] |
+------------------------+------+-----------------+
 
 
 
 Reason for Visit
 
 
+-------------------+----------+
| Reason            | Comments |
+-------------------+----------+
| Medication Refill |          |
+-------------------+----------+
 
 
 
 Encounter Details
 
 
+--------+--------+---------------------+----------------------+-------------------+
| Date   | Type   | Department          | Care Team            | Description       |
+--------+--------+---------------------+----------------------+-------------------+
| / | Refill |   [place]         |   Marzena Moser  | Medication Refill |
| 2018   |        | NEPHROLOGY  [address]   | M, DO  [address]      |                   |
|        |        | [address]   | [address]      |                   |
|        |        | [place], [place]     | [place], [place]      |                   |
|        |        | [address]          | [address]  [phone]  |                   |
|        |        | [phone]        |  [phone] (Fax)  |                   |
+--------+--------+---------------------+----------------------+-------------------+
 
 
 
 Social History
 
 
+--------------+-------+-----------+--------+------+
| Tobacco Use  | Types | Packs/Day | Years  | Date |
|              |       |           | Used   |      |
+--------------+-------+-----------+--------+------+
| Never Smoker |       |           |        |      |
+--------------+-------+-----------+--------+------+
 
 
 
+---------------------+---+---+---+
| Smokeless Tobacco:  |   |   |   |
| Never Used          |   |   |   |
+---------------------+---+---+---+
 
 
 
+---------------------------------------------------------------+
| Comments: some second hand smoke exposure, but fairly minimal |
 
+---------------------------------------------------------------+
 
 
 
+-------------+-------------+---------+----------+
| Alcohol Use | Drinks/Week | oz/Week | Comments |
+-------------+-------------+---------+----------+
| No          |             |         |          |
+-------------+-------------+---------+----------+
 
 
 
+------------------+---------------+
| Sex Assigned at  | Date Recorded |
| Birth            |               |
+------------------+---------------+
| Not on file      |               |
+------------------+---------------+
 
 
 
+----------------+-------------+-------------+
| Job Start Date | Occupation  | Industry    |
+----------------+-------------+-------------+
| Not on file    | Not on file | Not on file |
+----------------+-------------+-------------+
 
 
 
+----------------+--------------+------------+
| Travel History | Travel Start | Travel End |
+----------------+--------------+------------+
 
 
 
+-------------------------------------+
| No recent travel history available. |
+-------------------------------------+
 documented as of this encounter
 
 Plan of Treatment
 
 
+--------+-----------+------------+----------------------+-------------------+
| Date   | Type      | Specialty  | Care Team            | Description       |
+--------+-----------+------------+----------------------+-------------------+
| / | Office    | Cardiology |   Tonia Wilson,    |                   |
|    | Visit     |            | ARNP  [address]   |                   |
|        |           |            | St [place], [place]  |                   |
|        |           |            | [address]  [phone]  |                   |
|        |           |            |  [phone] (Fax)  |                   |
+--------+-----------+------------+----------------------+-------------------+
| / | Hospital  | Radiology  |   Popeye Townsend, |                   |
|    | Encounter |            |  MD  [address] |                   |
|        |           |            |  [place],   |                   |
|        |           |            | [place] [address]             |                   |
|        |           |            | [phone]         |                   |
|        |           |            | [phone] (Fax)   |                   |
+--------+-----------+------------+----------------------+-------------------+
| / | Surgery   | Radiology  |   Popeye Townsend, | CV EP PPM SYSTEM  |
 
|    |           |            |  MD  [address] | IMPLANT           |
|        |           |            |  [place],   |                   |
|        |           |            | [place] [address]             |                   |
|        |           |            | [phone]         |                   |
|        |           |            | [phone] (Fax)   |                   |
+--------+-----------+------------+----------------------+-------------------+
| 02/10/ | Clinical  | Cardiology |                      |                   |
|    | Support   |            |                      |                   |
+--------+-----------+------------+----------------------+-------------------+
| / | Office    | Cardiology |   RakeshTonia andre,    |                   |
|    | Visit     |            | ARNP  [address]   |                   |
|        |           |            | [place]  |                   |
|        |           |            | [address] [phone]  |                   |
|        |           |            |  [phone] (Fax)  |                   |
+--------+-----------+------------+----------------------+-------------------+
| / | Off-Site  | Nephrology |   Marzena Moser  |                   |
|    | Visit     |            | DO ETIENNE  [address] [place] [address]      |                   |
|        |           |            | [address]      |                   |
|        |           |            | [place]      |                   |
|        |           |            | [address] [phone]  |                   |
|        |           |            |  [phone] (Fax)  |                   |
+--------+-----------+------------+----------------------+-------------------+
 documented as of this encounter
 
 Visit Diagnoses
 Not on filedocumented in this encounter

## 2020-01-08 NOTE — XMS
Encounter Summary
  Created on: 2020
 
 Viviana Ricci
 External Reference #: [id number]
 : 47
 Sex: Female
 
 Demographics
 
 
+-----------------------+----------------------+
| Address               | [address]      |
|                       | [place]  [address] |
+-----------------------+----------------------+
| Home Phone            | [phone]      |
+-----------------------+----------------------+
| Preferred Language    | Unknown              |
+-----------------------+----------------------+
| Marital Status        | Single               |
+-----------------------+----------------------+
| Buddhist Affiliation | 1009                 |
+-----------------------+----------------------+
| Race                  | Unknown              |
+-----------------------+----------------------+
| Ethnic Group          | Unknown              |
+-----------------------+----------------------+
 
 
 Author
 
 
+--------------+--------------------------------------------+
| Author       | [facility]  |
|              | and [place]                                |
+--------------+--------------------------------------------+
| Organization | [facility]  |
|              | and [place]                                |
+--------------+--------------------------------------------+
| Address      | Unknown                                    |
+--------------+--------------------------------------------+
| Phone        | Unavailable                                |
+--------------+--------------------------------------------+
 
 
 
 Support
 
 
+----------------+--------------+---------------------+-----------------+
| Name           | Relationship | Address             | Phone           |
+----------------+--------------+---------------------+-----------------+
| Ada/Ed Radhames | ECON         | [place]              | [phone] |
|                |              | [place]  |                 |
|                |              |  [address]              |                 |
+----------------+--------------+---------------------+-----------------+
 
 
 
 
 Care Team Providers
 
 
+-----------------------+------+-------------+
| Care Team Member Name | Role | Phone       |
+-----------------------+------+-------------+
 PCP  | Unavailable |
+-----------------------+------+-------------+
 
 
 
 Reason for Visit
 
 
+----------------+----------+
| Reason         | Comments |
+----------------+----------+
| Urinary Tract  |          |
| Infection      |          |
+----------------+----------+
 
 
 
 Encounter Details
 
 
+--------+-----------+---------------------+----------------------+----------------+
| Date   | Type      | Department          | Care Team            | Description    |
+--------+-----------+---------------------+----------------------+----------------+
| / | Telephone |   [facility]         |   Marzena Moser  | Urinary Tract  |
|    |           | NEPHROLOGY  [address]   | M, DO  [address]      | Infection      |
|        |           | [address]   | [address]      |                |
|        |           | [place], [place]     | [place], [place]      |                |
|        |           | [address]          | [address]  [phone]  |                |
|        |           | [phone]        |  [phone] (Fax)  |                |
+--------+-----------+---------------------+----------------------+----------------+
 
 
 
 Social History
 
 
+--------------+-------+-----------+--------+------+
| Tobacco Use  | Types | Packs/Day | Years  | Date |
|              |       |           | Used   |      |
+--------------+-------+-----------+--------+------+
| Never Smoker |       |           |        |      |
+--------------+-------+-----------+--------+------+
 
 
 
+---------------------+---+---+---+
| Smokeless Tobacco:  |   |   |   |
| Never Used          |   |   |   |
+---------------------+---+---+---+
 
 
 
+---------------------------------------------------------------+
 
| Comments: some second hand smoke exposure, but fairly minimal |
+---------------------------------------------------------------+
 
 
 
+-------------+-------------+---------+----------+
| Alcohol Use | Drinks/Week | oz/Week | Comments |
+-------------+-------------+---------+----------+
| No          |             |         |          |
+-------------+-------------+---------+----------+
 
 
 
+------------------+---------------+
| Sex Assigned at  | Date Recorded |
| Birth            |               |
+------------------+---------------+
| Not on file      |               |
+------------------+---------------+
 
 
 
+----------------+-------------+-------------+
| Job Start Date | Occupation  | Industry    |
+----------------+-------------+-------------+
| Not on file    | Not on file | Not on file |
+----------------+-------------+-------------+
 
 
 
+----------------+--------------+------------+
| Travel History | Travel Start | Travel End |
+----------------+--------------+------------+
 
 
 
+-------------------------------------+
| No recent travel history available. |
+-------------------------------------+
 documented as of this encounter
 
 Plan of Treatment
 
 
+--------+-----------+------------+----------------------+-------------------+
| Date   | Type      | Specialty  | Care Team            | Description       |
+--------+-----------+------------+----------------------+-------------------+
| / | Office    | Cardiology |   Tonia Wilson,    |                   |
|    | Visit     |            | ARNJESSICA  [address]   |                   |
|        |           |            | St  [facility] [place]  |                   |
|        |           |            | [address]  [phone]  |                   |
|        |           |            |  [phone] (Fax)  |                   |
+--------+-----------+------------+----------------------+-------------------+
| / | Hospital  | Radiology  |   Popeye Townsend, |                   |
|    | Encounter |            |  MD  [address] |                   |
|        |           |            |  [place],   |                   |
|        |           |            | [place] [address]             |                   |
|        |           |            | [phone]         |                   |
|        |           |            | [phone] (Fax)   |                   |
+--------+-----------+------------+----------------------+-------------------+
 
| / | Surgery   | Radiology  |   Popeye Townsend, | CV EP PPM SYSTEM  |
|    |           |            |  MD  [address] | IMPLANT           |
|        |           |            |  St.  [place],   |                   |
|        |           |            | [place] [address]             |                   |
|        |           |            | [phone]         |                   |
|        |           |            | [phone] (Fax)   |                   |
+--------+-----------+------------+----------------------+-------------------+
| 02/10/ | Clinical  | Cardiology |                      |                   |
|    | Support   |            |                      |                   |
+--------+-----------+------------+----------------------+-------------------+
| / | Office    | Cardiology |   Tonia Wilson,    |                   |
|    | Visit     |            | [facility]  [address]   |                   |
|        |           |            | [place]  |                   |
|        |           |            | [address]  [phone]  |                   |
|        |           |            |  [phone] (Fax)  |                   |
+--------+-----------+------------+----------------------+-------------------+
| / | Off-Site  | Nephrology |   Marzena Moser  |                   |
|    | Visit     |            | DO ETIENNE  [address] [place] [address]      |                   |
|        |           |            | [address]      |                   |
|        |           |            | [place]      |                   |
|        |           |            | [id number] [phone]  |                   |
|        |           |            |  [phone] (Fax)  |                   |
+--------+-----------+------------+----------------------+-------------------+
 documented as of this encounter
 
 Procedures
 
 
+----------------+--------+------------+----------------------+----------------------+
| Procedure Name | Priori | Date/Time  | Associated Diagnosis | Comments             |
|                | ty     |            |                      |                      |
+----------------+--------+------------+----------------------+----------------------+
| CULTURE, URINE | Routin | 2015 |                      |   Results for this   |
|                | e      |            |                      | procedure are in the |
|                |        |            |                      |  results section.    |
+----------------+--------+------------+----------------------+----------------------+
 documented in this encounter
 
 Results
 Culture, Urine (2015)
 
+-----------+----------------------+-----------+------------+--------------+
| Component | Value                | Ref Range | Performed  | Pathologist  |
|           |                      |           | At         | Signature    |
+-----------+----------------------+-----------+------------+--------------+
| Urine     | >275878 citrobacter  |           |            |              |
| Culture,  | freundii             |           |            |              |
| Routine   |                      |           |            |              |
+-----------+----------------------+-----------+------------+--------------+
 
 
 
+-----------------+
| Specimen        |
+-----------------+
| Urine specimen  |
| (specimen)      |
+-----------------+
 
 
 
 
+----------------------+------------------+--------+----------------+
| Organism             | Antibiotic       | Method | Susceptibility |
+----------------------+------------------+--------+----------------+
| Citrobacter freundii | Ciprofloxacin    |        |   Sensitive    |
+----------------------+------------------+--------+----------------+
| Citrobacter freundii | Ceftriaxone      |        |   Sensitive    |
+----------------------+------------------+--------+----------------+
| Citrobacter freundii | Nitrofurantoin   |        |   Sensitive    |
+----------------------+------------------+--------+----------------+
| Citrobacter freundii | Gentamicin       |        |   Sensitive    |
+----------------------+------------------+--------+----------------+
| Citrobacter freundii | Tetracycline     |        |   Sensitive    |
+----------------------+------------------+--------+----------------+
| Citrobacter freundii | Trimethoprim +   |        |   Sensitive    |
|                      | Sulfamethoxazole |        |                |
+----------------------+------------------+--------+----------------+
| Citrobacter freundii | Aztreonam        |        |   Sensitive    |
+----------------------+------------------+--------+----------------+
| Citrobacter freundii | Amoxicillin +    |        |   Resistant    |
|                      | Clavulanate      |        |                |
+----------------------+------------------+--------+----------------+
| Citrobacter freundii | Cefazolin        |        |   Resistant    |
+----------------------+------------------+--------+----------------+
| Citrobacter freundii | Ertapenem        |        |   Sensitive    |
+----------------------+------------------+--------+----------------+
 documented in this encounter
 
 Visit Diagnoses
 Not on filedocumented in this encounter

## 2020-01-08 NOTE — XMS
Encounter Summary
  Created on: 2020
 
 Viviana Ricci
 External Reference #: [id number]
 : 47
 Sex: Female
 
 Demographics
 
 
+-----------------------+----------------------+
| Address               | [address]      |
|                       | [place]  [address] |
+-----------------------+----------------------+
| Home Phone            | [phone]      |
+-----------------------+----------------------+
| Preferred Language    | Unknown              |
+-----------------------+----------------------+
| Marital Status        | Single               |
+-----------------------+----------------------+
| Anabaptist Affiliation | 1009                 |
+-----------------------+----------------------+
| Race                  | Unknown              |
+-----------------------+----------------------+
| Ethnic Group          | Unknown              |
+-----------------------+----------------------+
 
 
 Author
 
 
+--------------+--------------------------------------------+
| Author       | [facility]  |
|              | and [place]                                |
+--------------+--------------------------------------------+
| Organization | [facility]  |
|              | and [place]                                |
+--------------+--------------------------------------------+
| Address      | Unknown                                    |
+--------------+--------------------------------------------+
| Phone        | Unavailable                                |
+--------------+--------------------------------------------+
 
 
 
 Support
 
 
+----------------+--------------+---------------------+-----------------+
| Name           | Relationship | Address             | Phone           |
+----------------+--------------+---------------------+-----------------+
| Ada/Ed Radhames | ECON         | [place]              | [phone] |
|                |              | [place]  |                 |
|                |              |  [address]              |                 |
+----------------+--------------+---------------------+-----------------+
 
 
 
 
 Care Team Providers
 
 
+-----------------------+------+-------------+
| Care Team Member Name | Role | Phone       |
+-----------------------+------+-------------+
 PCP  | Unavailable |
+-----------------------+------+-------------+
 
 
 
 Reason for Visit
 
 
+-------------------+----------+
| Reason            | Comments |
+-------------------+----------+
| Medication Refill |          |
+-------------------+----------+
 
 
 
 Encounter Details
 
 
+--------+--------+---------------------+----------------------+-------------------+
| Date   | Type   | Department          | Care Team            | Description       |
+--------+--------+---------------------+----------------------+-------------------+
| / | Refill |   [facility]         |   Marzena Moser  | Medication Refill |
|    |        | NEPHROLOGY  [address]   | M, DO  [address]      |                   |
|        |        | [address]   | [address]      |                   |
|        |        | [place], [place]     | [place], [place]      |                   |
|        |        | [address]          | [address]  [phone]  |                   |
|        |        | [phone]        |  [phone] (Fax)  |                   |
+--------+--------+---------------------+----------------------+-------------------+
 
 
 
 Social History
 
 
+--------------+-------+-----------+--------+------+
| Tobacco Use  | Types | Packs/Day | Years  | Date |
|              |       |           | Used   |      |
+--------------+-------+-----------+--------+------+
| Never Smoker |       |           |        |      |
+--------------+-------+-----------+--------+------+
 
 
 
+---------------------+---+---+---+
| Smokeless Tobacco:  |   |   |   |
| Never Used          |   |   |   |
+---------------------+---+---+---+
 
 
 
+---------------------------------------------------------------+
| Comments: some second hand smoke exposure, but fairly minimal |
 
+---------------------------------------------------------------+
 
 
 
+-------------+-------------+---------+----------+
| Alcohol Use | Drinks/Week | oz/Week | Comments |
+-------------+-------------+---------+----------+
| No          |             |         |          |
+-------------+-------------+---------+----------+
 
 
 
+------------------+---------------+
| Sex Assigned at  | Date Recorded |
| Birth            |               |
+------------------+---------------+
| Not on file      |               |
+------------------+---------------+
 
 
 
+----------------+-------------+-------------+
| Job Start Date | Occupation  | Industry    |
+----------------+-------------+-------------+
| Not on file    | Not on file | Not on file |
+----------------+-------------+-------------+
 
 
 
+----------------+--------------+------------+
| Travel History | Travel Start | Travel End |
+----------------+--------------+------------+
 
 
 
+-------------------------------------+
| No recent travel history available. |
+-------------------------------------+
 documented as of this encounter
 
 Plan of Treatment
 
 
+--------+-----------+------------+----------------------+-------------------+
| Date   | Type      | Specialty  | Care Team            | Description       |
+--------+-----------+------------+----------------------+-------------------+
| / | Office    | Cardiology |   HellalfredoTonia,    |                   |
|    | Visit     |            | ARNP  [address]   |                   |
|        |           |            | St  [place], [place]  |                   |
|        |           |            | [address]  [phone]  |                   |
|        |           |            |  [phone] (Fax)  |                   |
+--------+-----------+------------+----------------------+-------------------+
| / | Hospital  | Radiology  |   Popeye Townsend, |                   |
|    | Encounter |            |  MD  [address] |                   |
|        |           |            |  St.  [place],   |                   |
|        |           |            | [place] [address]             |                   |
|        |           |            | [phone]         |                   |
|        |           |            | [phone] (Fax)   |                   |
+--------+-----------+------------+----------------------+-------------------+
| / | Surgery   | Radiology  |   Popeye Townsend, | CV EP PPM SYSTEM  |
 
|    |           |            |  MD  [address] | IMPLANT           |
|        |           |            |  St.  [place],   |                   |
|        |           |            | [place] [address]             |                   |
|        |           |            | [phone]         |                   |
|        |           |            | [phone] (Fax)   |                   |
+--------+-----------+------------+----------------------+-------------------+
| 02/10/ | Clinical  | Cardiology |                      |                   |
|    | Support   |            |                      |                   |
+--------+-----------+------------+----------------------+-------------------+
| / | Office    | Cardiology |   Tonia Wilson,    |                   |
|    | Visit     |            | ARNP  [address]   |                   |
|        |           |            | [place]  |                   |
|        |           |            | [address]  [phone]  |                   |
|        |           |            |  [phone] (Fax)  |                   |
+--------+-----------+------------+----------------------+-------------------+
| / | Off-Site  | Nephrology |   Marzena Moser  |                   |
|    | Visit     |            | DO ETIENNE  [address] [place] [address]      |                   |
|        |           |            | [address]      |                   |
|        |           |            | [place]      |                   |
|        |           |            | [address]  [phone]  |                   |
|        |           |            |  [phone] (Fax)  |                   |
+--------+-----------+------------+----------------------+-------------------+
 documented as of this encounter
 
 Visit Diagnoses
 Not on filedocumented in this encounter

## 2020-01-08 NOTE — XMS
Encounter Summary
  Created on: 2020
 
 Viviana Ricci
 External Reference #: [id number]
 : 47
 Sex: Female
 
 Demographics
 
 
+-----------------------+----------------------+
| Address               | [address]      |
|                       | [place]  [address] |
+-----------------------+----------------------+
| Home Phone            | [phone]      |
+-----------------------+----------------------+
| Preferred Language    | Unknown              |
+-----------------------+----------------------+
| Marital Status        | Single               |
+-----------------------+----------------------+
| Pentecostal Affiliation | [id number]                 |
+-----------------------+----------------------+
| Race                  | Unknown              |
+-----------------------+----------------------+
| Ethnic Group          | Unknown              |
+-----------------------+----------------------+
 
 
 Author
 
 
+--------------+--------------------------------------------+
| Author       | [facility]  |
|              | and [place]                                |
+--------------+--------------------------------------------+
| Organization | [facility]  |
|              | and [facility]                                |
+--------------+--------------------------------------------+
| Address      | Unknown                                    |
+--------------+--------------------------------------------+
| Phone        | Unavailable                                |
+--------------+--------------------------------------------+
 
 
 
 Support
 
 
+----------------+--------------+---------------------+-----------------+
| Name           | Relationship | Address             | Phone           |
+----------------+--------------+---------------------+-----------------+
| Ada/Ed Radhames | ECON         | [address]              | [phone] |
|                |              | [place]  |                 |
|                |              |  [address]              |                 |
+----------------+--------------+---------------------+-----------------+
 
 
 
 
 Care Team Providers
 
 
+------------------------+------+-----------------+
| Care Team Member Name  | Role | Phone           |
+------------------------+------+-----------------+
| Marzena Moser DO | PCP  | [phone] |
+------------------------+------+-----------------+
 
 
 
 Encounter Details
 
 
+--------+-------------+---------------------+---------------------+-------------+
| Date   | Type        | Department          | Care Team           | Description |
+--------+-------------+---------------------+---------------------+-------------+
| / | Orders Only |   [facility]  [place]         |   Juju Glass,  |             |
| 2019   |             | NEPHROLOGY  [address]   | RN                  |             |
|        |             | [address]   |                     |             |
|        |             | [place]     |                     |             |
|        |             | [address]          |                     |             |
|        |             | [phone]        |                     |             |
+--------+-------------+---------------------+---------------------+-------------+
 
 
 
 Social History
 
 
+--------------+-------+-----------+--------+------+
| Tobacco Use  | Types | Packs/Day | Years  | Date |
|              |       |           | Used   |      |
+--------------+-------+-----------+--------+------+
| Never Smoker |       |           |        |      |
+--------------+-------+-----------+--------+------+
 
 
 
+---------------------+---+---+---+
| Smokeless Tobacco:  |   |   |   |
| Never Used          |   |   |   |
+---------------------+---+---+---+
 
 
 
+---------------------------------------------------------------+
| Comments: some second hand smoke exposure, but fairly minimal |
+---------------------------------------------------------------+
 
 
 
+-------------+-------------+---------+----------+
| Alcohol Use | Drinks/Week | oz/Week | Comments |
+-------------+-------------+---------+----------+
| No          |             |         |          |
+-------------+-------------+---------+----------+
 
 
 
 
+------------------+---------------+
| Sex Assigned at  | Date Recorded |
| Birth            |               |
+------------------+---------------+
| Not on file      |               |
+------------------+---------------+
 
 
 
+----------------+-------------+-------------+
| Job Start Date | Occupation  | Industry    |
+----------------+-------------+-------------+
| Not on file    | Not on file | Not on file |
+----------------+-------------+-------------+
 
 
 
+----------------+--------------+------------+
| Travel History | Travel Start | Travel End |
+----------------+--------------+------------+
 
 
 
+-------------------------------------+
| No recent travel history available. |
+-------------------------------------+
 documented as of this encounter
 
 Plan of Treatment
 
 
+--------+-----------+------------+----------------------+-------------------+
| Date   | Type      | Specialty  | Care Team            | Description       |
+--------+-----------+------------+----------------------+-------------------+
| / | Office    | Cardiology |   Tonia Wilson,    |                   |
|    | Visit     |            | ARNJESSICA  [address]   |                   |
|        |           |            | [place]  |                   |
|        |           |            | [address]  [phone]  |                   |
|        |           |            |  [phone] (Fax)  |                   |
+--------+-----------+------------+----------------------+-------------------+
| / | Hospital  | Radiology  |   Popeye Townsend, |                   |
|    | Encounter |            |  MD  [address] |                   |
|        |           |            |  St.  [place],   |                   |
|        |           |            | [place] [address]             |                   |
|        |           |            | [phone]         |                   |
|        |           |            | [phone] (Fax)   |                   |
+--------+-----------+------------+----------------------+-------------------+
| / | Surgery   | Radiology  |   Popeye Townsend, | CV EP PPM SYSTEM  |
|    |           |            |  MD  [address] | IMPLANT           |
|        |           |            |  St.  [place],   |                   |
|        |           |            | [place] [address]             |                   |
|        |           |            | [phone]         |                   |
|        |           |            | [phone] (Fax)   |                   |
+--------+-----------+------------+----------------------+-------------------+
| 02/10/ | Clinical  | Cardiology |                      |                   |
|    | Support   |            |                      |                   |
+--------+-----------+------------+----------------------+-------------------+
| / | Office    | Cardiology |   Tonia Wilson,    |                   |
|    | Visit     |            | ARNP  [address]   |                   |
 
|        |           |            | St  [place]  |                   |
|        |           |            | [address]  [phone]  |                   |
|        |           |            |  [phone] (Fax)  |                   |
+--------+-----------+------------+----------------------+-------------------+
| / | Off-Site  | Nephrology |   Marzena Moser  |                   |
|    | Visit     |            | DO ETIENNE  [address] [place] [address]      |                   |
|        |           |            | [address]      |                   |
|        |           |            | [place]      |                   |
|        |           |            | [id number] [phone]  |                   |
|        |           |            |  [phone] (Fax)  |                   |
+--------+-----------+------------+----------------------+-------------------+
 documented as of this encounter
 
 Visit Diagnoses
 Not on filedocumented in this encounter

## 2020-01-08 NOTE — XMS
Encounter Summary
  Created on: 2020
 
 Viviana Ricci
 External Reference #: [id number]
 : 47
 Sex: Female
 
 Demographics
 
 
+-----------------------+----------------------+
| Address               | [address]      |
|                       | [place]  [address] |
+-----------------------+----------------------+
| Home Phone            | [phone]      |
+-----------------------+----------------------+
| Preferred Language    | Unknown              |
+-----------------------+----------------------+
| Marital Status        | Single               |
+-----------------------+----------------------+
| Zoroastrianism Affiliation | 1009                 |
+-----------------------+----------------------+
| Race                  | Unknown              |
+-----------------------+----------------------+
| Ethnic Group          | Unknown              |
+-----------------------+----------------------+
 
 
 Author
 
 
+--------------+--------------------------------------------+
| Author       | [facility]  |
|              | and [place]                                |
+--------------+--------------------------------------------+
| Organization | [facility]  |
|              | and [place]                                |
+--------------+--------------------------------------------+
| Address      | Unknown                                    |
+--------------+--------------------------------------------+
| Phone        | Unavailable                                |
+--------------+--------------------------------------------+
 
 
 
 Support
 
 
+----------------+--------------+---------------------+-----------------+
| Name           | Relationship | Address             | Phone           |
+----------------+--------------+---------------------+-----------------+
| Ada/Ed Radhames | ECON         | [address]              | [phone] |
|                |              | [place], [place]  |                 |
|                |              |  [address]              |                 |
+----------------+--------------+---------------------+-----------------+
 
 
 
 
 Care Team Providers
 
 
+-----------------------+------+-------------+
| Care Team Member Name | Role | Phone       |
+-----------------------+------+-------------+
 PCP  | Unavailable |
+-----------------------+------+-------------+
 
 
 
 Encounter Details
 
 
+--------+-----------+----------------------+----------------------+-------------+
| Date   | Type      | Department           | Care Team            | Description |
+--------+-----------+----------------------+----------------------+-------------+
| / | [facility]  |   [facility] |   Edgar Sanders,   |             |
|  - | Encounter |  MED CTR GENERIC IP  | MD  [address]     |             |
|        |           | CONV DEPT  [address]     | [place]      |             |
| / |           | [place], | [address]  [phone]  |             |
|    |           |  [place] [address]       |  [phone] (Fax)  |             |
|        |           | [phone]         |                      |             |
+--------+-----------+----------------------+----------------------+-------------+
 
 
 
 Social History
 
 
+----------------+-------+-----------+--------+------+
| Tobacco Use    | Types | Packs/Day | Years  | Date |
|                |       |           | Used   |      |
+----------------+-------+-----------+--------+------+
| Never Assessed |       |           |        |      |
+----------------+-------+-----------+--------+------+
 
 
 
+------------------+---------------+
| Sex Assigned at  | Date Recorded |
| Birth            |               |
+------------------+---------------+
| Not on file      |               |
+------------------+---------------+
 
 
 
+----------------+-------------+-------------+
| Job Start Date | Occupation  | Industry    |
+----------------+-------------+-------------+
| Not on file    | Not on file | Not on file |
+----------------+-------------+-------------+
 
 
 
+----------------+--------------+------------+
| Travel History | Travel Start | Travel End |
+----------------+--------------+------------+
 
 
 
 
+-------------------------------------+
| No recent travel history available. |
+-------------------------------------+
 documented as of this encounter
 
 Plan of Treatment
 
 
+--------+-----------+------------+----------------------+-------------------+
| Date   | Type      | Specialty  | Care Team            | Description       |
+--------+-----------+------------+----------------------+-------------------+
| / | Office    | Cardiology |   Tonia Wilson,    |                   |
|    | Visit     |            | ARNJESSICA  [address]   |                   |
|        |           |            | St  [facility] [place]  |                   |
|        |           |            | [address]  [phone]  |                   |
|        |           |            |  [phone] (Fax)  |                   |
+--------+-----------+------------+----------------------+-------------------+
| / | Hospital  | Radiology  |   Popeye Townsend, |                   |
|    | Encounter |            |  MD  [address] |                   |
|        |           |            |  [place],   |                   |
|        |           |            | [place] [address]             |                   |
|        |           |            | [phone]         |                   |
|        |           |            | [phone] (Fax)   |                   |
+--------+-----------+------------+----------------------+-------------------+
| / | Surgery   | Radiology  |   Popeye Townsend, | CV EP PPM SYSTEM  |
|    |           |            |  MD  [address] | IMPLANT           |
|        |           |            |  St.  [place],   |                   |
|        |           |            | [place] [address]             |                   |
|        |           |            | [phone]         |                   |
|        |           |            | [phone] (Fax)   |                   |
+--------+-----------+------------+----------------------+-------------------+
| 02/10/ | Clinical  | Cardiology |                      |                   |
|    | Support   |            |                      |                   |
+--------+-----------+------------+----------------------+-------------------+
| / | Office    | Cardiology |   Tonia Wilson,    |                   |
|    | Visit     |            | BELKIS  [address]   |                   |
|        |           |            | [place]  |                   |
|        |           |            | [address] [phone]  |                   |
|        |           |            |  [phone] (Fax)  |                   |
+--------+-----------+------------+----------------------+-------------------+
| / | Off-Site  | Nephrology |   Marzena Moser  |                   |
|    | Visit     |            | DO ETIENNE  [address] [place] [address]      |                   |
|        |           |            | [address]      |                   |
|        |           |            | [place]      |                   |
|        |           |            | [address] [phone]  |                   |
|        |           |            |  [phone] (Fax)  |                   |
+--------+-----------+------------+----------------------+-------------------+
 documented as of this encounter
 
 Visit Diagnoses
 Not on filedocumented in this encounter

## 2020-01-08 NOTE — XMS
Encounter Summary
  Created on: 2020
 
 Viviana Ricci
 External Reference #: [id number]
 : 47
 Sex: Female
 
 Demographics
 
 
+-----------------------+----------------------+
| Address               | [address]      |
|                       | [place]  [address] |
+-----------------------+----------------------+
| Home Phone            | [phone]      |
+-----------------------+----------------------+
| Preferred Language    | Unknown              |
+-----------------------+----------------------+
| Marital Status        | Single               |
+-----------------------+----------------------+
| [employer]odoxy Affiliation | 1009                 |
+-----------------------+----------------------+
| Race                  | Unknown              |
+-----------------------+----------------------+
| Ethnic Group          | Unknown              |
+-----------------------+----------------------+
 
 
 Author
 
 
+--------------+--------------------------------------------+
| Author       | [facility]  |
|              | and [place]                                |
+--------------+--------------------------------------------+
| Organization | [facility]  |
|              | and [place]                                |
+--------------+--------------------------------------------+
| Address      | Unknown                                    |
+--------------+--------------------------------------------+
| Phone        | Unavailable                                |
+--------------+--------------------------------------------+
 
 
 
 Support
 
 
+----------------+--------------+---------------------+-----------------+
| Name           | Relationship | Address             | Phone           |
+----------------+--------------+---------------------+-----------------+
| Ada/Ed Radhames | ECON         | [address]              | [phone] |
|                |              | [place]  |                 |
|                |              |  [address]              |                 |
+----------------+--------------+---------------------+-----------------+
 
 
 
 
 Care Team Providers
 
 
+------------------------+------+-----------------+
| Care Team Member Name  | Role | Phone           |
+------------------------+------+-----------------+
| Marzena Moser DO | PCP  | [phone] |
+------------------------+------+-----------------+
 
 
 
 Reason for Visit
 
 
+-------------------+----------+
| Reason            | Comments |
+-------------------+----------+
| Medication Refill |          |
+-------------------+----------+
 
 
 
 Encounter Details
 
 
+--------+--------+---------------------+----------------------+-------------------+
| Date   | Type   | Department          | Care Team            | Description       |
+--------+--------+---------------------+----------------------+-------------------+
| / | Refill |   [facility]         |   Marzena Moser  | Medication Refill |
| 2019   |        | NEPHROLOGY  [address]   | M, DO  [address]      |                   |
|        |        | [address]   | [address]      |                   |
|        |        | [place], [place]     | [place], [place]      |                   |
|        |        | [address]          | [address]  [phone]  |                   |
|        |        | [phone]        |  [phone] (Fax)  |                   |
+--------+--------+---------------------+----------------------+-------------------+
 
 
 
 Social History
 
 
+--------------+-------+-----------+--------+------+
| Tobacco Use  | Types | Packs/Day | Years  | Date |
|              |       |           | Used   |      |
+--------------+-------+-----------+--------+------+
| Never Smoker |       |           |        |      |
+--------------+-------+-----------+--------+------+
 
 
 
+---------------------+---+---+---+
| Smokeless Tobacco:  |   |   |   |
| Never Used          |   |   |   |
+---------------------+---+---+---+
 
 
 
+---------------------------------------------------------------+
| Comments: some second hand smoke exposure, but fairly minimal |
 
+---------------------------------------------------------------+
 
 
 
+-------------+-------------+---------+----------+
| Alcohol Use | Drinks/Week | oz/Week | Comments |
+-------------+-------------+---------+----------+
| No          |             |         |          |
+-------------+-------------+---------+----------+
 
 
 
+------------------+---------------+
| Sex Assigned at  | Date Recorded |
| Birth            |               |
+------------------+---------------+
| Not on file      |               |
+------------------+---------------+
 
 
 
+----------------+-------------+-------------+
| Job Start Date | Occupation  | Industry    |
+----------------+-------------+-------------+
| Not on file    | Not on file | Not on file |
+----------------+-------------+-------------+
 
 
 
+----------------+--------------+------------+
| Travel History | Travel Start | Travel End |
+----------------+--------------+------------+
 
 
 
+-------------------------------------+
| No recent travel history available. |
+-------------------------------------+
 documented as of this encounter
 
 Plan of Treatment
 
 
+--------+-----------+------------+----------------------+-------------------+
| Date   | Type      | Specialty  | Care Team            | Description       |
+--------+-----------+------------+----------------------+-------------------+
| / | Office    | Cardiology |   Tonia Wilson,    |                   |
|    | Visit     |            | ARNP  [address]   |                   |
|        |           |            | St [place], [place]  |                   |
|        |           |            | [address]  [phone]  |                   |
|        |           |            |  [phone] (Fax)  |                   |
+--------+-----------+------------+----------------------+-------------------+
| / | Hospital  | Radiology  |   Popeye Townsend, |                   |
|    | Encounter |            |  MD  [address] |                   |
|        |           |            |  [place],   |                   |
|        |           |            | [place] [address]             |                   |
|        |           |            | [phone]         |                   |
|        |           |            | [phone] (Fax)   |                   |
+--------+-----------+------------+----------------------+-------------------+
| / | Surgery   | Radiology  |   Popeye Townsend, | CV EP PPM SYSTEM  |
 
|    |           |            |  MD  [address] | IMPLANT           |
|        |           |            |  [place],   |                   |
|        |           |            | [place] [address]             |                   |
|        |           |            | [phone]         |                   |
|        |           |            | [phone] (Fax)   |                   |
+--------+-----------+------------+----------------------+-------------------+
| 02/10/ | Clinical  | Cardiology |                      |                   |
|    | Support   |            |                      |                   |
+--------+-----------+------------+----------------------+-------------------+
| / | Office    | Cardiology |   RakeshTonia andre,    |                   |
|    | Visit     |            | ARNP  [address]   |                   |
|        |           |            | [place]  |                   |
|        |           |            | [address] [phone]  |                   |
|        |           |            |  [phone] (Fax)  |                   |
+--------+-----------+------------+----------------------+-------------------+
| / | Off-Site  | Nephrology |   Marzena Moser  |                   |
|    | Visit     |            | DO ETIENNE  [address] [place] [address]      |                   |
|        |           |            | [address]      |                   |
|        |           |            | [place]      |                   |
|        |           |            | [address] [phone]  |                   |
|        |           |            |  [phone] (Fax)  |                   |
+--------+-----------+------------+----------------------+-------------------+
 documented as of this encounter
 
 Visit Diagnoses
 Not on filedocumented in this encounter

## 2020-01-08 NOTE — XMS
Encounter Summary
  Created on: 2020
 
 Viviana Ricci
 External Reference #: [id number]
 : 47
 Sex: Female
 
 Demographics
 
 
+-----------------------+----------------------+
| Address               | [address]      |
|                       | [place]  [address] |
+-----------------------+----------------------+
| Home Phone            | [phone]      |
+-----------------------+----------------------+
| Preferred Language    | Unknown              |
+-----------------------+----------------------+
| Marital Status        | Single               |
+-----------------------+----------------------+
| [employer] Affiliation | 1009                 |
+-----------------------+----------------------+
| Race                  | Unknown              |
+-----------------------+----------------------+
| Ethnic Group          | Unknown              |
+-----------------------+----------------------+
 
 
 Author
 
 
+--------------+--------------------------------------------+
| Author       | [facility]  |
|              | and [place]                                |
+--------------+--------------------------------------------+
| Organization | [facility]  |
|              | and [facility]                                |
+--------------+--------------------------------------------+
| Address      | Unknown                                    |
+--------------+--------------------------------------------+
| Phone        | Unavailable                                |
+--------------+--------------------------------------------+
 
 
 
 Support
 
 
+----------------+--------------+---------------------+-----------------+
| Name           | Relationship | Address             | Phone           |
+----------------+--------------+---------------------+-----------------+
| Ada/Ed Radhames | ECON         | [address]              | [phone] |
|                |              | [place]  |                 |
|                |              |  [address]              |                 |
+----------------+--------------+---------------------+-----------------+
 
 
 
 
 Care Team Providers
 
 
+-----------------------+------+-------------+
| Care Team Member Name | Role | Phone       |
+-----------------------+------+-------------+
 PCP  | Unavailable |
+-----------------------+------+-------------+
 
 
 
 Reason for Visit
 
 
+-------------------+----------+
| Reason            | Comments |
+-------------------+----------+
| Medication Refill |          |
+-------------------+----------+
 
 
 
 Encounter Details
 
 
+--------+--------+---------------------+----------------------+-------------------+
| Date   | Type   | Department          | Care Team            | Description       |
+--------+--------+---------------------+----------------------+-------------------+
| / | Refill |   [facility]         |   Marzena Moser  | Medication Refill |
|    |        | NEPHROLOGY  [address]   | M, DO  [address]      |                   |
|        |        | [address]   | [address]      |                   |
|        |        | [place], [place]     | [place], [place]      |                   |
|        |        | [address]          | [address]  [phone]  |                   |
|        |        | [phone]        |  [phone] (Fax)  |                   |
+--------+--------+---------------------+----------------------+-------------------+
 
 
 
 Social History
 
 
+--------------+-------+-----------+--------+------+
| Tobacco Use  | Types | Packs/Day | Years  | Date |
|              |       |           | Used   |      |
+--------------+-------+-----------+--------+------+
| Never Smoker |       |           |        |      |
+--------------+-------+-----------+--------+------+
 
 
 
+---------------------+---+---+---+
| Smokeless Tobacco:  |   |   |   |
| Never Used          |   |   |   |
+---------------------+---+---+---+
 
 
 
+---------------------------------------------------------------+
| Comments: some second hand smoke exposure, but fairly minimal |
 
+---------------------------------------------------------------+
 
 
 
+-------------+-------------+---------+----------+
| Alcohol Use | Drinks/Week | oz/Week | Comments |
+-------------+-------------+---------+----------+
| No          |             |         |          |
+-------------+-------------+---------+----------+
 
 
 
+------------------+---------------+
| Sex Assigned at  | Date Recorded |
| Birth            |               |
+------------------+---------------+
| Not on file      |               |
+------------------+---------------+
 
 
 
+----------------+-------------+-------------+
| Job Start Date | Occupation  | Industry    |
+----------------+-------------+-------------+
| Not on file    | Not on file | Not on file |
+----------------+-------------+-------------+
 
 
 
+----------------+--------------+------------+
| Travel History | Travel Start | Travel End |
+----------------+--------------+------------+
 
 
 
+-------------------------------------+
| No recent travel history available. |
+-------------------------------------+
 documented as of this encounter
 
 Plan of Treatment
 
 
+--------+-----------+------------+----------------------+-------------------+
| Date   | Type      | Specialty  | Care Team            | Description       |
+--------+-----------+------------+----------------------+-------------------+
| / | Office    | Cardiology |   HellalfredoTonia,    |                   |
|    | Visit     |            | ARNP  [address]   |                   |
|        |           |            | St  [place], [place]  |                   |
|        |           |            | [address]  [phone]  |                   |
|        |           |            |  [phone] (Fax)  |                   |
+--------+-----------+------------+----------------------+-------------------+
| / | Hospital  | Radiology  |   Popeye Townsend, |                   |
|    | Encounter |            |  MD  [address] |                   |
|        |           |            |  St.  [place],   |                   |
|        |           |            | [place] [address]             |                   |
|        |           |            | [phone]         |                   |
|        |           |            | [phone] (Fax)   |                   |
+--------+-----------+------------+----------------------+-------------------+
| / | Surgery   | Radiology  |   Popeye Townsend, | CV EP PPM SYSTEM  |
 
|    |           |            |  MD  [address] | IMPLANT           |
|        |           |            |  St.  [place],   |                   |
|        |           |            | [place] [address]             |                   |
|        |           |            | [phone]         |                   |
|        |           |            | [phone] (Fax)   |                   |
+--------+-----------+------------+----------------------+-------------------+
| 02/10/ | Clinical  | Cardiology |                      |                   |
|    | Support   |            |                      |                   |
+--------+-----------+------------+----------------------+-------------------+
| / | Office    | Cardiology |   Tonia Wilson,    |                   |
|    | Visit     |            | ARNP  [address]   |                   |
|        |           |            | [place]  |                   |
|        |           |            | [address]  [phone]  |                   |
|        |           |            |  [phone] (Fax)  |                   |
+--------+-----------+------------+----------------------+-------------------+
| / | Off-Site  | Nephrology |   Marzena Moser  |                   |
|    | Visit     |            | DO ETIENNE  [address] [place] [address]      |                   |
|        |           |            | [address]      |                   |
|        |           |            | [place]      |                   |
|        |           |            | [address]  [phone]  |                   |
|        |           |            |  [phone] (Fax)  |                   |
+--------+-----------+------------+----------------------+-------------------+
 documented as of this encounter
 
 Visit Diagnoses
 Not on filedocumented in this encounter

## 2020-01-08 NOTE — XMS
Encounter Summary
  Created on: 2020
 
 Viviana Ricci
 External Reference #: [id number]
 : 47
 Sex: Female
 
 Demographics
 
 
+-----------------------+----------------------+
| Address               | [address]      |
|                       | [place]  [address] |
+-----------------------+----------------------+
| Home Phone            | [phone]      |
+-----------------------+----------------------+
| Preferred Language    | Unknown              |
+-----------------------+----------------------+
| Marital Status        | Single               |
+-----------------------+----------------------+
| Zoroastrianism Affiliation | [id number]                 |
+-----------------------+----------------------+
| Race                  | Unknown              |
+-----------------------+----------------------+
| Ethnic Group          | Unknown              |
+-----------------------+----------------------+
 
 
 Author
 
 
+--------------+--------------------------------------------+
| Author       | [facility]  |
|              | and [facility]                                |
+--------------+--------------------------------------------+
| Organization | [facility]  |
|              | and [facility]                                |
+--------------+--------------------------------------------+
| Address      | Unknown                                    |
+--------------+--------------------------------------------+
| Phone        | Unavailable                                |
+--------------+--------------------------------------------+
 
 
 
 Support
 
 
+----------------+--------------+---------------------+-----------------+
| Name           | Relationship | Address             | Phone           |
+----------------+--------------+---------------------+-----------------+
| Ada/Ed Radhames | ECON         | [address]              | [phone] |
|                |              | [place]  |                 |
|                |              |  [address]              |                 |
+----------------+--------------+---------------------+-----------------+
 
 
 
 
 Care Team Providers
 
 
+------------------------+------+-----------------+
| Care Team Member Name  | Role | Phone           |
+------------------------+------+-----------------+
| Marzena Moser DO | PCP  | [phone] |
+------------------------+------+-----------------+
 
 
 
 Encounter Details
 
 
+--------+----------+---------------------+----------------------+-------------+
| Date   | Type     | Department          | Care Team            | Description |
+--------+----------+---------------------+----------------------+-------------+
| / | Abstract |   [facility]         |   Marzena Moser  |             |
| 2019   |          | NEPHROLOGY  [address]   | DO [address]      |             |
|        |          | [address]   | [address]      |             |
|        |          | [place], [place]     | [place], [place]      |             |
|        |          | [address]          | [address]  [phone]  |             |
|        |          | [phone]        |  [phone] (Fax)  |             |
+--------+----------+---------------------+----------------------+-------------+
 
 
 
 Social History
 
 
+--------------+-------+-----------+--------+------+
| Tobacco Use  | Types | Packs/Day | Years  | Date |
|              |       |           | Used   |      |
+--------------+-------+-----------+--------+------+
| Never Smoker |       |           |        |      |
+--------------+-------+-----------+--------+------+
 
 
 
+---------------------+---+---+---+
| Smokeless Tobacco:  |   |   |   |
| Never Used          |   |   |   |
+---------------------+---+---+---+
 
 
 
+---------------------------------------------------------------+
| Comments: some second hand smoke exposure, but fairly minimal |
+---------------------------------------------------------------+
 
 
 
+-------------+-------------+---------+----------+
| Alcohol Use | Drinks/Week | oz/Week | Comments |
+-------------+-------------+---------+----------+
| No          |             |         |          |
+-------------+-------------+---------+----------+
 
 
 
 
+------------------+---------------+
| Sex Assigned at  | Date Recorded |
| Birth            |               |
+------------------+---------------+
| Not on file      |               |
+------------------+---------------+
 
 
 
+----------------+-------------+-------------+
| Job Start Date | Occupation  | Industry    |
+----------------+-------------+-------------+
| Not on file    | Not on file | Not on file |
+----------------+-------------+-------------+
 
 
 
+----------------+--------------+------------+
| Travel History | Travel Start | Travel End |
+----------------+--------------+------------+
 
 
 
+-------------------------------------+
| No recent travel history available. |
+-------------------------------------+
 documented as of this encounter
 
 Plan of Treatment
 
 
+--------+-----------+------------+----------------------+-------------------+
| Date   | Type      | Specialty  | Care Team            | Description       |
+--------+-----------+------------+----------------------+-------------------+
| / | Office    | Cardiology |   Tonia Wilson,    |                   |
|    | Visit     |            | ARNP  [address]   |                   |
|        |           |            | St  [place] [facility] [place]  |                   |
|        |           |            | [address]  [phone]  |                   |
|        |           |            |  [phone] (Fax)  |                   |
+--------+-----------+------------+----------------------+-------------------+
| / | Hospital  | Radiology  |   Popeye Townsend, |                   |
|    | Encounter |            |  MD  [address] |                   |
|        |           |            |  St.  [place],   |                   |
|        |           |            | [place] [address]             |                   |
|        |           |            | [phone]         |                   |
|        |           |            | [phone] (Fax)   |                   |
+--------+-----------+------------+----------------------+-------------------+
| / | Surgery   | Radiology  |   Popeye Townsend, | CV EP PPM SYSTEM  |
|    |           |            |  MD  [address] | IMPLANT           |
|        |           |            |  St.  [place],   |                   |
|        |           |            | [place] [address]             |                   |
|        |           |            | [phone]         |                   |
|        |           |            | [phone] (Fax)   |                   |
+--------+-----------+------------+----------------------+-------------------+
| 02/10/ | Clinical  | Cardiology |                      |                   |
2020   | Support   |            |                      |                   |
+--------+-----------+------------+----------------------+-------------------+
| / | Office    | Cardiology |   Tonia Wilson,    |                   |
|    | Visit     |            | [facility]  [address]   |                   |
 
|        |           |            |   [place]  |                   |
|        |           |            | [address]  [phone]  |                   |
|        |           |            |  [phone] (Fax)  |                   |
+--------+-----------+------------+----------------------+-------------------+
| / | Off-Site  | Nephrology |   Marzena Moser  |                   |
2020   | Visit     |            | DO [facility]TIENNE  [address]      |                   |
|        |           |            | [address]      |                   |
|        |           |            | [place]      |                   |
|        |           |            | [address]  [phone]  |                   |
|        |           |            |  [phone] (Fax)  |                   |
+--------+-----------+------------+----------------------+-------------------+
 documented as of this encounter
 
 Procedures
 
 
+----------------------+--------+------------+----------------------+----------------------+
| Procedure Name       | Priori | Date/Time  | Associated Diagnosis | Comments             |
|                      | ty     |            |                      |                      |
+----------------------+--------+------------+----------------------+----------------------+
| EXTERNAL LAB: ALMA    | Routin | 2019 |                      |   Results for this   |
|                      | e      |            |                      | procedure are in the |
|                      |        |            |                      |  results section.    |
+----------------------+--------+------------+----------------------+----------------------+
| EXTERNAL LAB:        | Routin | 2019 |                      |   Results for this   |
| GLUCOSE              | e      |            |                      | procedure are in the |
|                      |        |            |                      |  results section.    |
+----------------------+--------+------------+----------------------+----------------------+
| EXTERNAL LAB: ALT    | Routin | 2019 |                      |   Results for this   |
|                      | e      |            |                      | procedure are in the |
|                      |        |            |                      |  results section.    |
+----------------------+--------+------------+----------------------+----------------------+
| EXTERNAL LAB: ELOY    | Jerry | 2019 |                      |   Results for this   |
|                      | e      |            |                      | procedure are in the |
|                      |        |            |                      |  results section.    |
+----------------------+--------+------------+----------------------+----------------------+
| EXTERNAL LAB:        | Routin | 2019 |                      |   Results for this   |
| ALKALINE PHOSPHATASE | e      |            |                      | procedure are in the |
|                      |        |            |                      |  results section.    |
+----------------------+--------+------------+----------------------+----------------------+
| EXTERNAL LAB:        | Routin | 2019 |                      |   Results for this   |
| BILIRUBIN, TOTAL     | e      |            |                      | procedure are in the |
|                      |        |            |                      |  results section.    |
+----------------------+--------+------------+----------------------+----------------------+
| EXTERNAL LAB:        | Routin | 2019 |                      |   Results for this   |
| ALBUMIN              | e      |            |                      | procedure are in the |
|                      |        |            |                      |  results section.    |
+----------------------+--------+------------+----------------------+----------------------+
| EXTERNAL LAB:        | Routin | 2019 |                      |   Results for this   |
| PROTEIN, TOTAL       | e      |            |                      | procedure are in the |
|                      |        |            |                      |  results section.    |
+----------------------+--------+------------+----------------------+----------------------+
| EXTERNAL LAB:        | Routin | 2019 |                      |   Results for this   |
| PHOSPHORUS           | e      |            |                      | procedure are in the |
|                      |        |            |                      |  results section.    |
+----------------------+--------+------------+----------------------+----------------------+
| EXTERNAL LAB:        | Routin | 2019 |                      |   Results for this   |
| MAGNESIUM            | e      |            |                      | procedure are in the |
|                      |        |            |                      |  results section.    |
+----------------------+--------+------------+----------------------+----------------------+
 
| EXTERNAL LAB:        | Routin | 2019 |                      |   Results for this   |
| CALCIUM              | e      |            |                      | procedure are in the |
|                      |        |            |                      |  results section.    |
+----------------------+--------+------------+----------------------+----------------------+
| EXTERNAL LAB: CARBON | Routin | 2019 |                      |   Results for this   |
|  DIOXIDE             | e      |            |                      | procedure are in the |
|                      |        |            |                      |  results section.    |
+----------------------+--------+------------+----------------------+----------------------+
| EXTERNAL LAB:        | Routin | 2019 |                      |   Results for this   |
| CHLORIDE             | e      |            |                      | procedure are in the |
|                      |        |            |                      |  results section.    |
+----------------------+--------+------------+----------------------+----------------------+
| EXTERNAL LAB:        | Routin | 2019 |                      |   Results for this   |
| POTASSIUM            | e      |            |                      | procedure are in the |
|                      |        |            |                      |  results section.    |
+----------------------+--------+------------+----------------------+----------------------+
| EXTERNAL LAB: SODIUM | Routin | 2019 |                      |   Results for this   |
|                      | e      |            |                      | procedure are in the |
|                      |        |            |                      |  results section.    |
+----------------------+--------+------------+----------------------+----------------------+
| EXTERNAL LAB: CBC    | Routin | 2019 |                      |   Results for this   |
|                      | e      |            |                      | procedure are in the |
|                      |        |            |                      |  results section.    |
+----------------------+--------+------------+----------------------+----------------------+
| EXTERNAL LAB: TSH    | Routin | 2019 |                      |   Results for this   |
|                      | e      |            |                      | procedure are in the |
|                      |        |            |                      |  results section.    |
+----------------------+--------+------------+----------------------+----------------------+
| EXTERNAL LAB:        | Routin | 2019 |                      |   Results for this   |
| TRIGLYCERIDES        | e      |            |                      | procedure are in the |
|                      |        |            |                      |  results section.    |
+----------------------+--------+------------+----------------------+----------------------+
| EXTERNAL LAB:        | Routin | 2019 |                      |   Results for this   |
| CHOLESTEROL, HDL     | e      |            |                      | procedure are in the |
|                      |        |            |                      |  results section.    |
+----------------------+--------+------------+----------------------+----------------------+
| EXTERNAL LAB:        | Routin | 2019 |                      |   Results for this   |
| CHOLESTEROL, TOTAL   | e      |            |                      | procedure are in the |
|                      |        |            |                      |  results section.    |
+----------------------+--------+------------+----------------------+----------------------+
| EXTERNAL LAB:        | Routin | 2019 |                      |   Results for this   |
| CHOLESTEROL, LDL     | e      |            |                      | procedure are in the |
|                      |        |            |                      |  results section.    |
+----------------------+--------+------------+----------------------+----------------------+
| EXTERNAL LAB: EGFR   | Routin | 2019 |                      |   Results for this   |
|                      | e      |            |                      | procedure are in the |
|                      |        |            |                      |  results section.    |
+----------------------+--------+------------+----------------------+----------------------+
| EXTERNAL LAB:        | Routin | 2019 |                      |   Results for this   |
| CREATININE           | e      |            |                      | procedure are in the |
|                      |        |            |                      |  results section.    |
+----------------------+--------+------------+----------------------+----------------------+
| HEMOGLOBIN A1C       | Routin | 2019 |                      |   Results for this   |
|                      | e      |            |                      | procedure are in the |
|                      |        |            |                      |  results section.    |
+----------------------+--------+------------+----------------------+----------------------+
 documented in this encounter
 
 Results
 Hemoglobin A1C (2019)
 
 
+-------------+-------+-----------+------------+--------------+
| Component   | Value | Ref Range | Performed  | Pathologist  |
|             |       |           | At         | Signature    |
+-------------+-------+-----------+------------+--------------+
| Hemoglobin  | 6.4   | %         | EXTERNAL   |              |
| A1c         |       |           | LAB        |              |
+-------------+-------+-----------+------------+--------------+
 
 
 
+----------+
| Specimen |
+----------+
| Blood    |
+----------+
 
 
 
+----------------+---------+--------------------+--------------+
| Performing     | Address | City/State/Zipcode | Phone Number |
| Organization   |         |                    |              |
+----------------+---------+--------------------+--------------+
|   EXTERNAL LAB |         |                    |              |
+----------------+---------+--------------------+--------------+
 External Lab: ALMA (2019)
 
+-----------+-------+-----------+------------+--------------+
| Component | Value | Ref Range | Performed  | Pathologist  |
|           |       |           | At         | Signature    |
+-----------+-------+-----------+------------+--------------+
| ALMA,      | 42    |           | EXTERNAL   |              |
| External  |       |           | LAB        |              |
+-----------+-------+-----------+------------+--------------+
 
 
 
+----------------+---------+--------------------+--------------+
| Performing     | Address | City/State/Zipcode | Phone Number |
| Organization   |         |                    |              |
+----------------+---------+--------------------+--------------+
|   EXTERNAL LAB |         |                    |              |
+----------------+---------+--------------------+--------------+
 External Lab: Glucose (2019)
 
+-----------+-------+-----------+------------+--------------+
| Component | Value | Ref Range | Performed  | Pathologist  |
|           |       |           | At         | Signature    |
+-----------+-------+-----------+------------+--------------+
| Glucose,  | 89    |           | EXTERNAL   |              |
| External  |       |           | LAB        |              |
+-----------+-------+-----------+------------+--------------+
 
 
 
+----------------+---------+--------------------+--------------+
| Performing     | Address | City/State/Zipcode | Phone Number |
| Organization   |         |                    |              |
+----------------+---------+--------------------+--------------+
|   EXTERNAL LAB |         |                    |              |
 
+----------------+---------+--------------------+--------------+
 External Lab: ALT (2019)
 
+-----------+-------+-----------+------------+--------------+
| Component | Value | Ref Range | Performed  | Pathologist  |
|           |       |           | At         | Signature    |
+-----------+-------+-----------+------------+--------------+
| ALT,      | 37    |           | EXTERNAL   |              |
| External  |       |           | LAB        |              |
+-----------+-------+-----------+------------+--------------+
 
 
 
+----------------+---------+--------------------+--------------+
| Performing     | Address | City/State/Zipcode | Phone Number |
| Organization   |         |                    |              |
+----------------+---------+--------------------+--------------+
|   EXTERNAL LAB |         |                    |              |
+----------------+---------+--------------------+--------------+
 External Lab: ELOY (2019)
 
+-----------+-------+-----------+------------+--------------+
| Component | Value | Ref Range | Performed  | Pathologist  |
|           |       |           | At         | Signature    |
+-----------+-------+-----------+------------+--------------+
| AST,      | 46    |           | EXTERNAL   |              |
| External  |       |           | LAB        |              |
+-----------+-------+-----------+------------+--------------+
 
 
 
+----------------+---------+--------------------+--------------+
| Performing     | Address | City/State/Zipcode | Phone Number |
| Organization   |         |                    |              |
+----------------+---------+--------------------+--------------+
|   EXTERNAL LAB |         |                    |              |
+----------------+---------+--------------------+--------------+
 External Lab: Alkaline Phosphatase (2019)
 
+-----------+-------+-----------+------------+--------------+
| Component | Value | Ref Range | Performed  | Pathologist  |
|           |       |           | At         | Signature    |
+-----------+-------+-----------+------------+--------------+
| ALP,      | 77    |           | EXTERNAL   |              |
| External  |       |           | LAB        |              |
+-----------+-------+-----------+------------+--------------+
 
 
 
+----------------+---------+--------------------+--------------+
| Performing     | Address | City/State/Zipcode | Phone Number |
| Organization   |         |                    |              |
+----------------+---------+--------------------+--------------+
|   EXTERNAL LAB |         |                    |              |
+----------------+---------+--------------------+--------------+
 External Lab: Bilirubin, Total (2019)
 
+-------------+-------+-----------+------------+--------------+
| Component   | Value | Ref Range | Performed  | Pathologist  |
|             |       |           | At         | Signature    |
 
+-------------+-------+-----------+------------+--------------+
| Bilirubin,  | 0.5   |           | EXTERNAL   |              |
| Total,      |       |           | LAB        |              |
| External    |       |           |            |              |
+-------------+-------+-----------+------------+--------------+
 
 
 
+----------------+---------+--------------------+--------------+
| Performing     | Address | City/State/Zipcode | Phone Number |
| Organization   |         |                    |              |
+----------------+---------+--------------------+--------------+
|   EXTERNAL LAB |         |                    |              |
+----------------+---------+--------------------+--------------+
 External Lab: Albumin (2019)
 
+-----------+-------+-----------+------------+--------------+
| Component | Value | Ref Range | Performed  | Pathologist  |
|           |       |           | At         | Signature    |
+-----------+-------+-----------+------------+--------------+
| Albumin,  | 4.0   |           | EXTERNAL   |              |
| External  |       |           | LAB        |              |
+-----------+-------+-----------+------------+--------------+
 
 
 
+----------------+---------+--------------------+--------------+
| Performing     | Address | City/State/Zipcode | Phone Number |
| Organization   |         |                    |              |
+----------------+---------+--------------------+--------------+
|   EXTERNAL LAB |         |                    |              |
+----------------+---------+--------------------+--------------+
 External Lab: Protein, Total (2019)
 
+-----------+-------+-----------+------------+--------------+
| Component | Value | Ref Range | Performed  | Pathologist  |
|           |       |           | At         | Signature    |
+-----------+-------+-----------+------------+--------------+
| Protein,  | 5.9   |           | EXTERNAL   |              |
| Total,    |       |           | LAB        |              |
| External  |       |           |            |              |
+-----------+-------+-----------+------------+--------------+
 
 
 
+----------------+---------+--------------------+--------------+
| Performing     | Address | City/State/Zipcode | Phone Number |
| Organization   |         |                    |              |
+----------------+---------+--------------------+--------------+
|   EXTERNAL LAB |         |                    |              |
+----------------+---------+--------------------+--------------+
 External Lab: Phosphorus (2019)
 
+-------------+-------+-----------+------------+--------------+
| Component   | Value | Ref Range | Performed  | Pathologist  |
|             |       |           | At         | Signature    |
+-------------+-------+-----------+------------+--------------+
| Phosphorus, | 2.9   |           | EXTERNAL   |              |
|  External   |       |           | LAB        |              |
+-------------+-------+-----------+------------+--------------+
 
 
 
 
+----------------+---------+--------------------+--------------+
| Performing     | Address | City/State/Zipcode | Phone Number |
| Organization   |         |                    |              |
+----------------+---------+--------------------+--------------+
|   EXTERNAL LAB |         |                    |              |
+----------------+---------+--------------------+--------------+
 External Lab: Magnesium (2019)
 
+-------------+-------+-----------+------------+--------------+
| Component   | Value | Ref Range | Performed  | Pathologist  |
|             |       |           | At         | Signature    |
+-------------+-------+-----------+------------+--------------+
| Magnesium,  | 2.1   |           | EXTERNAL   |              |
| External    |       |           | LAB        |              |
+-------------+-------+-----------+------------+--------------+
 
 
 
+----------------+---------+--------------------+--------------+
| Performing     | Address | City/State/Zipcode | Phone Number |
| Organization   |         |                    |              |
+----------------+---------+--------------------+--------------+
|   EXTERNAL LAB |         |                    |              |
+----------------+---------+--------------------+--------------+
 External Lab: Calcium (2019)
 
+-----------+-------+-----------+------------+--------------+
| Component | Value | Ref Range | Performed  | Pathologist  |
|           |       |           | At         | Signature    |
+-----------+-------+-----------+------------+--------------+
| Calcium,  | 10.1  |           | EXTERNAL   |              |
| External  |       |           | LAB        |              |
+-----------+-------+-----------+------------+--------------+
 
 
 
+----------------+---------+--------------------+--------------+
| Performing     | Address | City/State/Zipcode | Phone Number |
| Organization   |         |                    |              |
+----------------+---------+--------------------+--------------+
|   EXTERNAL LAB |         |                    |              |
+----------------+---------+--------------------+--------------+
 External Lab: Carbon Dioxide (2019)
 
+-----------+-------+-----------+------------+--------------+
| Component | Value | Ref Range | Performed  | Pathologist  |
|           |       |           | At         | Signature    |
+-----------+-------+-----------+------------+--------------+
| Carbon    | 22    |           | EXTERNAL   |              |
| Dioxide,  |       |           | LAB        |              |
| External  |       |           |            |              |
+-----------+-------+-----------+------------+--------------+
 
 
 
+----------------+---------+--------------------+--------------+
| Performing     | Address | City/State/Zipcode | Phone Number |
 
| Organization   |         |                    |              |
+----------------+---------+--------------------+--------------+
|   EXTERNAL LAB |         |                    |              |
+----------------+---------+--------------------+--------------+
 External Lab: Chloride (2019)
 
+------------+-------+-----------+------------+--------------+
| Component  | Value | Ref Range | Performed  | Pathologist  |
|            |       |           | At         | Signature    |
+------------+-------+-----------+------------+--------------+
| Chloride,  | 110   |           | EXTERNAL   |              |
| External   |       |           | LAB        |              |
+------------+-------+-----------+------------+--------------+
 
 
 
+----------------+---------+--------------------+--------------+
| Performing     | Address | City/State/Zipcode | Phone Number |
| Organization   |         |                    |              |
+----------------+---------+--------------------+--------------+
|   EXTERNAL LAB |         |                    |              |
+----------------+---------+--------------------+--------------+
 External Lab: Potassium (2019)
 
+-------------+-------+-----------+------------+--------------+
| Component   | Value | Ref Range | Performed  | Pathologist  |
|             |       |           | At         | Signature    |
+-------------+-------+-----------+------------+--------------+
| Potassium,  | 5.1   |           | EXTERNAL   |              |
| External    |       |           | LAB        |              |
+-------------+-------+-----------+------------+--------------+
 
 
 
+----------------+---------+--------------------+--------------+
| Performing     | Address | City/State/Zipcode | Phone Number |
| Organization   |         |                    |              |
+----------------+---------+--------------------+--------------+
|   EXTERNAL LAB |         |                    |              |
+----------------+---------+--------------------+--------------+
 External Lab: Sodium (2019)
 
+-----------+-------+-----------+------------+--------------+
| Component | Value | Ref Range | Performed  | Pathologist  |
|           |       |           | At         | Signature    |
+-----------+-------+-----------+------------+--------------+
| Sodium,   | 144   |           | EXTERNAL   |              |
| External  |       |           | LAB        |              |
+-----------+-------+-----------+------------+--------------+
 
 
 
+----------------+---------+--------------------+--------------+
| Performing     | Address | City/State/Zipcode | Phone Number |
| Organization   |         |                    |              |
+----------------+---------+--------------------+--------------+
|   EXTERNAL LAB |         |                    |              |
+----------------+---------+--------------------+--------------+
 External Lab: CBC (2019)
 
 
+-----------+-------+-----------+------------+--------------+
| Component | Value | Ref Range | Performed  | Pathologist  |
|           |       |           | At         | Signature    |
+-----------+-------+-----------+------------+--------------+
| WBC,      | 5.6   |           | EXTERNAL   |              |
| External  |       |           | LAB        |              |
+-----------+-------+-----------+------------+--------------+
| HGB,      | 13.9  |           | EXTERNAL   |              |
| External  |       |           | LAB        |              |
+-----------+-------+-----------+------------+--------------+
| HCT,      | 42.6  |           | EXTERNAL   |              |
| External  |       |           | LAB        |              |
+-----------+-------+-----------+------------+--------------+
| PLT,      | 176   |           | EXTERNAL   |              |
| External  |       |           | LAB        |              |
+-----------+-------+-----------+------------+--------------+
| RBC,      | 4.07  |           | EXTERNAL   |              |
| External  |       |           | LAB        |              |
+-----------+-------+-----------+------------+--------------+
| MCV,      | 105   |           | EXTERNAL   |              |
| External  |       |           | LAB        |              |
+-----------+-------+-----------+------------+--------------+
| RDW,      | 13.8  |           | EXTERNAL   |              |
| External  |       |           | LAB        |              |
+-----------+-------+-----------+------------+--------------+
 
 
 
+----------------+---------+--------------------+--------------+
| Performing     | Address | City/State/Zipcode | Phone Number |
| Organization   |         |                    |              |
+----------------+---------+--------------------+--------------+
|   EXTERNAL LAB |         |                    |              |
+----------------+---------+--------------------+--------------+
 External Lab: TSH (2019)
 
+-----------+-------+-----------+------------+--------------+
| Component | Value | Ref Range | Performed  | Pathologist  |
|           |       |           | At         | Signature    |
+-----------+-------+-----------+------------+--------------+
| TSH,      | 1.85  |           | EXTERNAL   |              |
| External  |       |           | LAB        |              |
+-----------+-------+-----------+------------+--------------+
 
 
 
+----------+
| Specimen |
+----------+
| Blood    |
+----------+
 
 
 
+----------------+---------+--------------------+--------------+
| Performing     | Address | City/State/Zipcode | Phone Number |
| Organization   |         |                    |              |
+----------------+---------+--------------------+--------------+
|   EXTERNAL LAB |         |                    |              |
+----------------+---------+--------------------+--------------+
 
 External Lab: Triglycerides (2019)
 
+-------------+-------+-----------+------------+--------------+
| Component   | Value | Ref Range | Performed  | Pathologist  |
|             |       |           | At         | Signature    |
+-------------+-------+-----------+------------+--------------+
| Triglycerid | 115   |           | EXTERNAL   |              |
| es,         |       |           | LAB        |              |
| External    |       |           |            |              |
+-------------+-------+-----------+------------+--------------+
 
 
 
+----------+
| Specimen |
+----------+
| Blood    |
+----------+
 
 
 
+----------------+---------+--------------------+--------------+
| Performing     | Address | City/State/Zipcode | Phone Number |
| Organization   |         |                    |              |
+----------------+---------+--------------------+--------------+
|   EXTERNAL LAB |         |                    |              |
+----------------+---------+--------------------+--------------+
 External Lab: Cholesterol, HDL (2019)
 
+-------------+-------+-----------+------------+--------------+
| Component   | Value | Ref Range | Performed  | Pathologist  |
|             |       |           | At         | Signature    |
+-------------+-------+-----------+------------+--------------+
| HDL         | 51    | mg/dl     | EXTERNAL   |              |
| Cholesterol |       |           | LAB        |              |
| , External  |       |           |            |              |
+-------------+-------+-----------+------------+--------------+
 
 
 
+----------+
| Specimen |
+----------+
| Blood    |
+----------+
 
 
 
+----------------+---------+--------------------+--------------+
| Performing     | Address | City/State/Zipcode | Phone Number |
| Organization   |         |                    |              |
+----------------+---------+--------------------+--------------+
|   EXTERNAL LAB |         |                    |              |
+----------------+---------+--------------------+--------------+
 External Lab: Cholesterol, Total (2019)
 
+-------------+-------+-----------+------------+--------------+
| Component   | Value | Ref Range | Performed  | Pathologist  |
|             |       |           | At         | Signature    |
+-------------+-------+-----------+------------+--------------+
 
| Cholesterol | 125   | mg/dl     | EXTERNAL   |              |
| , Total,    |       |           | LAB        |              |
| External    |       |           |            |              |
+-------------+-------+-----------+------------+--------------+
 
 
 
+----------+
| Specimen |
+----------+
| Blood    |
+----------+
 
 
 
+----------------+---------+--------------------+--------------+
| Performing     | Address | City/State/Zipcode | Phone Number |
| Organization   |         |                    |              |
+----------------+---------+--------------------+--------------+
|   EXTERNAL LAB |         |                    |              |
+----------------+---------+--------------------+--------------+
 External Lab: Cholesterol, LDL (2019)
 
+-------------+-------+-----------+------------+--------------+
| Component   | Value | Ref Range | Performed  | Pathologist  |
|             |       |           | At         | Signature    |
+-------------+-------+-----------+------------+--------------+
| LDL         | 51    |           | EXTERNAL   |              |
| Cholesterol |       |           | LAB        |              |
| , Direct,   |       |           |            |              |
| External    |       |           |            |              |
+-------------+-------+-----------+------------+--------------+
 
 
 
+----------+
| Specimen |
+----------+
| Blood    |
+----------+
 
 
 
+----------------+---------+--------------------+--------------+
| Performing     | Address | City/State/Zipcode | Phone Number |
| Organization   |         |                    |              |
+----------------+---------+--------------------+--------------+
|   EXTERNAL LAB |         |                    |              |
+----------------+---------+--------------------+--------------+
 External Lab: eGFR (2019)
 
+-----------+-------+-----------+------------+--------------+
| Component | Value | Ref Range | Performed  | Pathologist  |
|           |       |           | At         | Signature    |
+-----------+-------+-----------+------------+--------------+
| eGFR,     | 35    |           | EXTERNAL   |              |
| External  |       |           | LAB        |              |
+-----------+-------+-----------+------------+--------------+
 
 
 
 
+----------+
| Specimen |
+----------+
| Blood    |
+----------+
 
 
 
+----------------+---------+--------------------+--------------+
| Performing     | Address | City/State/Zipcode | Phone Number |
| Organization   |         |                    |              |
+----------------+---------+--------------------+--------------+
|   EXTERNAL LAB |         |                    |              |
+----------------+---------+--------------------+--------------+
 External Lab: Creatinine (2019)
 
+-------------+-------+-----------+------------+--------------+
| Component   | Value | Ref Range | Performed  | Pathologist  |
|             |       |           | At         | Signature    |
+-------------+-------+-----------+------------+--------------+
| Creatinine, | 1.47  |           | EXTERNAL   |              |
|  External   |       |           | LAB        |              |
+-------------+-------+-----------+------------+--------------+
 
 
 
+----------+
| Specimen |
+----------+
| Blood    |
+----------+
 
 
 
+----------------+---------+--------------------+--------------+
| Performing     | Address | City/State/Zipcode | Phone Number |
| Organization   |         |                    |              |
+----------------+---------+--------------------+--------------+
|   EXTERNAL LAB |         |                    |              |
+----------------+---------+--------------------+--------------+
 documented in this encounter
 
 Visit Diagnoses
 Not on filedocumented in this encounter

## 2020-01-08 NOTE — XMS
Encounter Summary
  Created on: 2020
 
 Viviana Ricci
 External Reference #: [id number]
 : 47
 Sex: Female
 
 Demographics
 
 
+-----------------------+----------------------+
| Address               | [address]      |
|                       | [place]  [address] |
+-----------------------+----------------------+
| Home Phone            | [phone]      |
+-----------------------+----------------------+
| Preferred Language    | Unknown              |
+-----------------------+----------------------+
| Marital Status        | Single               |
+-----------------------+----------------------+
| Worship Affiliation | [id number]                 |
+-----------------------+----------------------+
| Race                  | Unknown              |
+-----------------------+----------------------+
| Ethnic Group          | Unknown              |
+-----------------------+----------------------+
 
 
 Author
 
 
+--------------+--------------------------------------------+
| Author       | [facility]  |
|              | and [facility]                                |
+--------------+--------------------------------------------+
| Organization | [facility]  |
|              | and [facility]                                |
+--------------+--------------------------------------------+
| Address      | Unknown                                    |
+--------------+--------------------------------------------+
| Phone        | Unavailable                                |
+--------------+--------------------------------------------+
 
 
 
 Support
 
 
+----------------+--------------+---------------------+-----------------+
| Name           | Relationship | Address             | Phone           |
+----------------+--------------+---------------------+-----------------+
| Ada/Ed Radhames | ECON         | [address]              | [phone] |
|                |              | [place]  |                 |
|                |              |  [id number]              |                 |
+----------------+--------------+---------------------+-----------------+
 
 
 
 
 Care Team Providers
 
 
+-----------------------+------+-------------+
| Care Team Member Name | Role | Phone       |
+-----------------------+------+-------------+
 PCP  | Unavailable |
+-----------------------+------+-------------+
 
 
 
 Encounter Details
 
 
+--------+-------------+---------------------+----------------------+-----------------+
| Date   | Type        | Department          | Care Team            | Description     |
+--------+-------------+---------------------+----------------------+-----------------+
| / | Orders Only |   [facility]         |   Marzena Moser  | Hypothyroidism  |
| 2015   |             | NEPHROLOGY  [address]   | M, DO  [address]      | (Primary Dx)    |
|        |             | [address]   | [address]      |                 |
|        |             | [place], [place]     | [place], [place]      |                 |
|        |             | [address]          | [address]  [phone]  |                 |
|        |             | [phone]        |  [phone] (Fax)  |                 |
+--------+-------------+---------------------+----------------------+-----------------+
 
 
 
 Social History
 
 
+--------------+-------+-----------+--------+------+
| Tobacco Use  | Types | Packs/Day | Years  | Date |
|              |       |           | Used   |      |
+--------------+-------+-----------+--------+------+
| Never Smoker |       |           |        |      |
+--------------+-------+-----------+--------+------+
 
 
 
+---------------------+---+---+---+
| Smokeless Tobacco:  |   |   |   |
| Never Used          |   |   |   |
+---------------------+---+---+---+
 
 
 
+---------------------------------------------------------------+
| Comments: some second hand smoke exposure, but fairly minimal |
+---------------------------------------------------------------+
 
 
 
+-------------+-------------+---------+----------+
| Alcohol Use | Drinks/Week | oz/Week | Comments |
+-------------+-------------+---------+----------+
| No          |             |         |          |
+-------------+-------------+---------+----------+
 
 
 
 
+------------------+---------------+
| Sex Assigned at  | Date Recorded |
| Birth            |               |
+------------------+---------------+
| Not on file      |               |
+------------------+---------------+
 
 
 
+----------------+-------------+-------------+
| Job Start Date | Occupation  | Industry    |
+----------------+-------------+-------------+
| Not on file    | Not on file | Not on file |
+----------------+-------------+-------------+
 
 
 
+----------------+--------------+------------+
| Travel History | Travel Start | Travel End |
+----------------+--------------+------------+
 
 
 
+-------------------------------------+
| No recent travel history available. |
+-------------------------------------+
 documented as of this encounter
 
 Plan of Treatment
 
 
+--------+-----------+------------+----------------------+-------------------+
| Date   | Type      | Specialty  | Care Team            | Description       |
+--------+-----------+------------+----------------------+-------------------+
| / | Office    | Cardiology |   Tonia Wilson,    |                   |
|    | Visit     |            | BELK[place]  [address]   |                   |
|        |           |            | St  [place]  |                   |
|        |           |            | [address]  [phone]  |                   |
|        |           |            |  [phone] (Fax)  |                   |
+--------+-----------+------------+----------------------+-------------------+
| / | Hospital  | Radiology  |   Popeye Townsend, |                   |
|    | Encounter |            |  MD  [address] |                   |
|        |           |            |  St.  [place],   |                   |
|        |           |            | [place] [address]             |                   |
|        |           |            | [phone]         |                   |
|        |           |            | [phone] (Fax)   |                   |
+--------+-----------+------------+----------------------+-------------------+
| / | Surgery   | Radiology  |   Popeye Townsend, | CV EP PPM SYSTEM  |
|    |           |            |  MD  [address] | IMPLANT           |
|        |           |            |  [place],   |                   |
|        |           |            | [place] [address]             |                   |
|        |           |            | [phone]         |                   |
|        |           |            | [phone] (Fax)   |                   |
+--------+-----------+------------+----------------------+-------------------+
| 02/10/ | Clinical  | Cardiology |                      |                   |
2020   | Support   |            |                      |                   |
+--------+-----------+------------+----------------------+-------------------+
| / | Office    | Cardiology |   Tonia Wilson,    |                   |
|    | Visit     |            | ARN  [address]   |                   |
 
|        |           |            | St  [place]  |                   |
|        |           |            | [address]  [phone]  |                   |
|        |           |            |  [phone] (Fax)  |                   |
+--------+-----------+------------+----------------------+-------------------+
| / | Off-Site  | Nephrology |   Marzena Moser  |                   |
|    | Visit     |            | DO ETIENNE  [address]      |                   |
|        |           |            | [address]      |                   |
|        |           |            | [place]      |                   |
|        |           |            | [address]  [phone]  |                   |
|        |           |            |  [phone] (Fax)  |                   |
+--------+-----------+------------+----------------------+-------------------+
 documented as of this encounter
 
 Visit Diagnoses
 
 
+--------------------------------------------------------+
| Diagnosis                                              |
+--------------------------------------------------------+
|   Hypothyroidism - Primary  Unspecified hypothyroidism |
+--------------------------------------------------------+
 documented in this encounter

## 2020-01-08 NOTE — XMS
Encounter Summary
  Created on: 2020
 
 Viviana Ricci
 External Reference #: [id number]
 : 47
 Sex: Female
 
 Demographics
 
 
+-----------------------+----------------------+
| Address               | [address]      |
|                       | [place]  [address] |
+-----------------------+----------------------+
| Home Phone            | [phone]      |
+-----------------------+----------------------+
| Preferred Language    | Unknown              |
+-----------------------+----------------------+
| Marital Status        | Single               |
+-----------------------+----------------------+
| Jehovah's witness Affiliation | [id number]                 |
+-----------------------+----------------------+
| Race                  | Unknown              |
+-----------------------+----------------------+
| Ethnic Group          | Unknown              |
+-----------------------+----------------------+
 
 
 Author
 
 
+--------------+--------------------------------------------+
| Author       | [facility]  |
|              | and [facility]                                |
+--------------+--------------------------------------------+
| Organization | [facility]  |
|              | and [facility]                                |
+--------------+--------------------------------------------+
| Address      | Unknown                                    |
+--------------+--------------------------------------------+
| Phone        | Unavailable                                |
+--------------+--------------------------------------------+
 
 
 
 Support
 
 
+----------------+--------------+---------------------+-----------------+
| Name           | Relationship | Address             | Phone           |
+----------------+--------------+---------------------+-----------------+
| Ada/Ed Radhames | ECON         | [address]              | [phone] |
|                |              | [place]  |                 |
|                |              |  [address]              |                 |
+----------------+--------------+---------------------+-----------------+
 
 
 
 
 Care Team Providers
 
 
+------------------------+------+-----------------+
| Care Team Member Name  | Role | Phone           |
+------------------------+------+-----------------+
| Marzena Moser DO | PCP  | [phone] |
+------------------------+------+-----------------+
 
 
 
 Encounter Details
 
 
+--------+----------+---------------------+----------------------+-------------+
| Date   | Type     | Department          | Care Team            | Description |
+--------+----------+---------------------+----------------------+-------------+
| / | Abstract |   [facility]         |   Marzena Moser  |             |
|    |          | NEPHROLOGY  [address]   | [address]      |             |
|        |          | [address]   | [address]      |             |
|        |          | [place], [place]     | [place], [place]      |             |
|        |          | [address]          | [address]  [phone]  |             |
|        |          | [phone]        |  [phone] (Fax)  |             |
+--------+----------+---------------------+----------------------+-------------+
 
 
 
 Social History
 
 
+--------------+-------+-----------+--------+------+
| Tobacco Use  | Types | Packs/Day | Years  | Date |
|              |       |           | Used   |      |
+--------------+-------+-----------+--------+------+
| Never Smoker |       |           |        |      |
+--------------+-------+-----------+--------+------+
 
 
 
+---------------------+---+---+---+
| Smokeless Tobacco:  |   |   |   |
| Never Used          |   |   |   |
+---------------------+---+---+---+
 
 
 
+---------------------------------------------------------------+
| Comments: some second hand smoke exposure, but fairly minimal |
+---------------------------------------------------------------+
 
 
 
+-------------+-------------+---------+----------+
| Alcohol Use | Drinks/Week | oz/Week | Comments |
+-------------+-------------+---------+----------+
| No          |             |         |          |
+-------------+-------------+---------+----------+
 
 
 
 
+------------------+---------------+
| Sex Assigned at  | Date Recorded |
| Birth            |               |
+------------------+---------------+
| Not on file      |               |
+------------------+---------------+
 
 
 
+----------------+-------------+-------------+
| Job Start Date | Occupation  | Industry    |
+----------------+-------------+-------------+
| Not on file    | Not on file | Not on file |
+----------------+-------------+-------------+
 
 
 
+----------------+--------------+------------+
| Travel History | Travel Start | Travel End |
+----------------+--------------+------------+
 
 
 
+-------------------------------------+
| No recent travel history available. |
+-------------------------------------+
 documented as of this encounter
 
 Plan of Treatment
 
 
+--------+-----------+------------+----------------------+-------------------+
| Date   | Type      | Specialty  | Care Team            | Description       |
+--------+-----------+------------+----------------------+-------------------+
| / | Office    | Cardiology |   Tonia Wilson,    |                   |
|    | Visit     |            | ARNP  [address]   |                   |
|        |           |            | St  [place] [facility] [place]  |                   |
|        |           |            | [address]  [phone]  |                   |
|        |           |            |  [phone] (Fax)  |                   |
+--------+-----------+------------+----------------------+-------------------+
| / | Hospital  | Radiology  |   Popeye Townsend, |                   |
|    | Encounter |            |  MD  [address] |                   |
|        |           |            |  St.  [place],   |                   |
|        |           |            | [place] [address]             |                   |
|        |           |            | [phone]         |                   |
|        |           |            | [phone] (Fax)   |                   |
+--------+-----------+------------+----------------------+-------------------+
| / | Surgery   | Radiology  |   Popeye Townsend, | CV EP PPM SYSTEM  |
|    |           |            |  MD  [address] | IMPLANT           |
|        |           |            |  St.  [place],   |                   |
|        |           |            | [place] [address]             |                   |
|        |           |            | [phone]         |                   |
|        |           |            | [phone] (Fax)   |                   |
+--------+-----------+------------+----------------------+-------------------+
| 02/10/ | Clinical  | Cardiology |                      |                   |
2020   | Support   |            |                      |                   |
+--------+-----------+------------+----------------------+-------------------+
| / | Office    | Cardiology |   Tonia Wilson,    |                   |
|    | Visit     |            | [facility]  [address]   |                   |
 
|        |           |            |   [place]  |                   |
|        |           |            | [address]  [phone]  |                   |
|        |           |            |  [phone] (Fax)  |                   |
+--------+-----------+------------+----------------------+-------------------+
| / | Off-Site  | Nephrology |   Marzena Moser  |                   |
2020   | Visit     |            | DO [facility]TIENNE  [address]      |                   |
|        |           |            | [address]      |                   |
|        |           |            | [place]      |                   |
|        |           |            | [address]  [phone]  |                   |
|        |           |            |  [phone] (Fax)  |                   |
+--------+-----------+------------+----------------------+-------------------+
 documented as of this encounter
 
 Procedures
 
 
+----------------------+--------+------------+----------------------+----------------------+
| Procedure Name       | Priori | Date/Time  | Associated Diagnosis | Comments             |
|                      | ty     |            |                      |                      |
+----------------------+--------+------------+----------------------+----------------------+
| EXTERNAL LAB: BUN    | Routin | 2018 |                      |   Results for this   |
|                      | e      |            |                      | procedure are in the |
|                      |        |            |                      |  results section.    |
+----------------------+--------+------------+----------------------+----------------------+
| EXTERNAL LAB:        | Routin | 2018 |                      |   Results for this   |
| GLUCOSE              | e      |            |                      | procedure are in the |
|                      |        |            |                      |  results section.    |
+----------------------+--------+------------+----------------------+----------------------+
| EXTERNAL LAB:        | Routin | 2018 |                      |   Results for this   |
| TACROLIMUS LEVEL,    | e      |            |                      | procedure are in the |
| [facility]                 |        |            |                      |  results section.    |
+----------------------+--------+------------+----------------------+----------------------+
| EXTERNAL LAB: ALT    | Routin | 2018 |                      |   Results for this   |
|                      | e      |            |                      | procedure are in the |
|                      |        |            |                      |  results section.    |
+----------------------+--------+------------+----------------------+----------------------+
| EXTERNAL LAB: AST    | Routin | 2018 |                      |   Results for this   |
|                      | e      |            |                      | procedure are in the |
|                      |        |            |                      |  results section.    |
+----------------------+--------+------------+----------------------+----------------------+
| EXTERNAL LAB:        | Routin | 2018 |                      |   Results for this   |
| ALKALINE PHOSPHATASE | e      |            |                      | procedure are in the |
|                      |        |            |                      |  results section.    |
+----------------------+--------+------------+----------------------+----------------------+
| EXTERNAL LAB:        | Routin | 2018 |                      |   Results for this   |
| BILIRUBIN, TOTAL     | e      |            |                      | procedure are in the |
|                      |        |            |                      |  results section.    |
+----------------------+--------+------------+----------------------+----------------------+
| EXTERNAL LAB:        | Routin | 2018 |                      |   Results for this   |
| ALBUMIN              | e      |            |                      | procedure are in the |
|                      |        |            |                      |  results section.    |
+----------------------+--------+------------+----------------------+----------------------+
| EXTERNAL LAB:        | Routin | 2018 |                      |   Results for this   |
| PROTEIN, TOTAL       | e      |            |                      | procedure are in the |
|                      |        |            |                      |  results section.    |
+----------------------+--------+------------+----------------------+----------------------+
| EXTERNAL LAB:        | Routin | 2018 |                      |   Results for this   |
| PHOSPHORUS           | e      |            |                      | procedure are in the |
|                      |        |            |                      |  results section.    |
+----------------------+--------+------------+----------------------+----------------------+
 
| EXTERNAL LAB:        | Routin | 2018 |                      |   Results for this   |
| MAGNESIUM            | e      |            |                      | procedure are in the |
|                      |        |            |                      |  results section.    |
+----------------------+--------+------------+----------------------+----------------------+
| EXTERNAL LAB:        | Routin | 2018 |                      |   Results for this   |
| CALCIUM              | e      |            |                      | procedure are in the |
|                      |        |            |                      |  results section.    |
+----------------------+--------+------------+----------------------+----------------------+
| EXTERNAL LAB: CARBON | Routin | 2018 |                      |   Results for this   |
|  DIOXIDE             | e      |            |                      | procedure are in the |
|                      |        |            |                      |  results section.    |
+----------------------+--------+------------+----------------------+----------------------+
| EXTERNAL LAB:        | Routin | 2018 |                      |   Results for this   |
| CHLORIDE             | e      |            |                      | procedure are in the |
|                      |        |            |                      |  results section.    |
+----------------------+--------+------------+----------------------+----------------------+
| EXTERNAL LAB:        | Routin | 2018 |                      |   Results for this   |
| POTASSIUM            | e      |            |                      | procedure are in the |
|                      |        |            |                      |  results section.    |
+----------------------+--------+------------+----------------------+----------------------+
| EXTERNAL LAB: SODIUM | Routin | 2018 |                      |   Results for this   |
|                      | e      |            |                      | procedure are in the |
|                      |        |            |                      |  results section.    |
+----------------------+--------+------------+----------------------+----------------------+
| EXTERNAL LAB: CBC    | Routin | 2018 |                      |   Results for this   |
|                      | e      |            |                      | procedure are in the |
|                      |        |            |                      |  results section.    |
+----------------------+--------+------------+----------------------+----------------------+
| EXTERNAL LAB: TSH    | Routin | 2018 |                      |   Results for this   |
|                      | e      |            |                      | procedure are in the |
|                      |        |            |                      |  results section.    |
+----------------------+--------+------------+----------------------+----------------------+
| EXTERNAL LAB:        | Routin | 2018 |                      |   Results for this   |
| TRIGLYCERIDES        | e      |            |                      | procedure are in the |
|                      |        |            |                      |  results section.    |
+----------------------+--------+------------+----------------------+----------------------+
| EXTERNAL LAB:        | Routin | 2018 |                      |   Results for this   |
| CHOLESTEROL, HDL     | e      |            |                      | procedure are in the |
|                      |        |            |                      |  results section.    |
+----------------------+--------+------------+----------------------+----------------------+
| EXTERNAL LAB:        | Routin | 2018 |                      |   Results for this   |
| CHOLESTEROL, TOTAL   | e      |            |                      | procedure are in the |
|                      |        |            |                      |  results section.    |
+----------------------+--------+------------+----------------------+----------------------+
| EXTERNAL LAB:        | Routin | 2018 |                      |   Results for this   |
| CHOLESTEROL, LDL     | e      |            |                      | procedure are in the |
|                      |        |            |                      |  results section.    |
+----------------------+--------+------------+----------------------+----------------------+
| EXTERNAL LAB: EGFR   | Routin | 2018 |                      |   Results for this   |
|                      | e      |            |                      | procedure are in the |
|                      |        |            |                      |  results section.    |
+----------------------+--------+------------+----------------------+----------------------+
| EXTERNAL LAB:        | Routin | 2018 |                      |   Results for this   |
| CREATININE           | e      |            |                      | procedure are in the |
|                      |        |            |                      |  results section.    |
+----------------------+--------+------------+----------------------+----------------------+
| HEMOGLOBIN A1C       | Routin | 2018 |                      |   Results for this   |
|                      | e      |            |                      | procedure are in the |
|                      |        |            |                      |  results section.    |
+----------------------+--------+------------+----------------------+----------------------+
 
 documented in this encounter
 
 Results
 External Lab: TSH (2018)
 
+-----------+-------+-----------+------------+--------------+
| Component | Value | Ref Range | Performed  | Pathologist  |
|           |       |           | At         | Signature    |
+-----------+-------+-----------+------------+--------------+
| TSH,      | 1.19  |           |            |              |
| External  |       |           |            |              |
+-----------+-------+-----------+------------+--------------+
 
 
 
+----------+
| Specimen |
+----------+
| Blood    |
+----------+
 Hemoglobin A1C (2018)
 
+-------------+-------+-----------+------------+--------------+
| Component   | Value | Ref Range | Performed  | Pathologist  |
|             |       |           | At         | Signature    |
+-------------+-------+-----------+------------+--------------+
| Hemoglobin  | 7.0   | %         |            |              |
| A1c         |       |           |            |              |
+-------------+-------+-----------+------------+--------------+
 
 
 
+----------+
| Specimen |
+----------+
| Blood    |
+----------+
 External Lab: BUN (2018)
 
+-----------+-------+-----------+------------+--------------+
| Component | Value | Ref Range | Performed  | Pathologist  |
|           |       |           | At         | Signature    |
+-----------+-------+-----------+------------+--------------+
| BUN,      | 45    |           |            |              |
| External  |       |           |            |              |
+-----------+-------+-----------+------------+--------------+
 External Lab: Glucose (2018)
 
+-----------+-------+-----------+------------+--------------+
| Component | Value | Ref Range | Performed  | Pathologist  |
|           |       |           | At         | Signature    |
+-----------+-------+-----------+------------+--------------+
| Glucose,  | 98    |           |            |              |
| External  |       |           |            |              |
+-----------+-------+-----------+------------+--------------+
 External Lab: ALT (2018)
 
+-----------+-------+-----------+------------+--------------+
| Component | Value | Ref Range | Performed  | Pathologist  |
|           |       |           | At         | Signature    |
 
+-----------+-------+-----------+------------+--------------+
| ALT,      | 15    |           |            |              |
| External  |       |           |            |              |
+-----------+-------+-----------+------------+--------------+
 External Lab: AST (2018)
 
+-----------+-------+-----------+------------+--------------+
| Component | Value | Ref Range | Performed  | Pathologist  |
|           |       |           | At         | Signature    |
+-----------+-------+-----------+------------+--------------+
| AST,      | 23    |           |            |              |
| External  |       |           |            |              |
+-----------+-------+-----------+------------+--------------+
 External Lab: Alkaline Phosphatase (2018)
 
+-----------+-------+-----------+------------+--------------+
| Component | Value | Ref Range | Performed  | Pathologist  |
|           |       |           | At         | Signature    |
+-----------+-------+-----------+------------+--------------+
| ALP,      | 96    |           |            |              |
| External  |       |           |            |              |
+-----------+-------+-----------+------------+--------------+
 External Lab: Bilirubin, Total (2018)
 
+-------------+-------+-----------+------------+--------------+
| Component   | Value | Ref Range | Performed  | Pathologist  |
|             |       |           | At         | Signature    |
+-------------+-------+-----------+------------+--------------+
| Bilirubin,  | 0.6   |           |            |              |
| Total,      |       |           |            |              |
| External    |       |           |            |              |
+-------------+-------+-----------+------------+--------------+
 External Lab: Albumin (2018)
 
+-----------+-------+-----------+------------+--------------+
| Component | Value | Ref Range | Performed  | Pathologist  |
|           |       |           | At         | Signature    |
+-----------+-------+-----------+------------+--------------+
| Albumin,  | 3.8   |           |            |              |
| External  |       |           |            |              |
+-----------+-------+-----------+------------+--------------+
 External Lab: Protein, Total (2018)
 
+-----------+-------+-----------+------------+--------------+
| Component | Value | Ref Range | Performed  | Pathologist  |
|           |       |           | At         | Signature    |
+-----------+-------+-----------+------------+--------------+
| Protein,  | 6.1   |           |            |              |
| Total,    |       |           |            |              |
| External  |       |           |            |              |
+-----------+-------+-----------+------------+--------------+
 External Lab: Phosphorus (2018)
 
+-------------+-------+-----------+------------+--------------+
| Component   | Value | Ref Range | Performed  | Pathologist  |
|             |       |           | At         | Signature    |
+-------------+-------+-----------+------------+--------------+
| Phosphorus, | 3.1   |           |            |              |
|  External   |       |           |            |              |
+-------------+-------+-----------+------------+--------------+
 
 External Lab: Magnesium (2018)
 
+-------------+-------+-----------+------------+--------------+
| Component   | Value | Ref Range | Performed  | Pathologist  |
|             |       |           | At         | Signature    |
+-------------+-------+-----------+------------+--------------+
| Magnesium,  | 1.8   |           |            |              |
| External    |       |           |            |              |
+-------------+-------+-----------+------------+--------------+
 External Lab: Calcium (2018)
 
+-----------+-------+-----------+------------+--------------+
| Component | Value | Ref Range | Performed  | Pathologist  |
|           |       |           | At         | Signature    |
+-----------+-------+-----------+------------+--------------+
| Calcium,  | 10.4  |           |            |              |
| External  |       |           |            |              |
+-----------+-------+-----------+------------+--------------+
 External Lab: Carbon Dioxide (2018)
 
+-----------+-------+-----------+------------+--------------+
| Component | Value | Ref Range | Performed  | Pathologist  |
|           |       |           | At         | Signature    |
+-----------+-------+-----------+------------+--------------+
| Carbon    | 21    |           |            |              |
| Dioxide,  |       |           |            |              |
| External  |       |           |            |              |
+-----------+-------+-----------+------------+--------------+
 External Lab: Chloride (2018)
 
+------------+-------+-----------+------------+--------------+
| Component  | Value | Ref Range | Performed  | Pathologist  |
|            |       |           | At         | Signature    |
+------------+-------+-----------+------------+--------------+
| Chloride,  | 109   |           |            |              |
| External   |       |           |            |              |
+------------+-------+-----------+------------+--------------+
 External Lab: Potassium (2018)
 
+-------------+-------+-----------+------------+--------------+
| Component   | Value | Ref Range | Performed  | Pathologist  |
|             |       |           | At         | Signature    |
+-------------+-------+-----------+------------+--------------+
| Potassium,  | 4.5   |           |            |              |
| External    |       |           |            |              |
+-------------+-------+-----------+------------+--------------+
 External Lab: Sodium (2018)
 
+-----------+-------+-----------+------------+--------------+
| Component | Value | Ref Range | Performed  | Pathologist  |
|           |       |           | At         | Signature    |
+-----------+-------+-----------+------------+--------------+
| Sodium,   | 146   |           |            |              |
| External  |       |           |            |              |
+-----------+-------+-----------+------------+--------------+
 External Lab: CBC (2018)
 
+-----------+-------+-----------+------------+--------------+
| Component | Value | Ref Range | Performed  | Pathologist  |
|           |       |           | At         | Signature    |
 
+-----------+-------+-----------+------------+--------------+
| WBC,      | 6.1   |           |            |              |
| External  |       |           |            |              |
+-----------+-------+-----------+------------+--------------+
| HGB,      | 13.9  |           |            |              |
| External  |       |           |            |              |
+-----------+-------+-----------+------------+--------------+
| HCT,      | 41.8  |           |            |              |
| External  |       |           |            |              |
+-----------+-------+-----------+------------+--------------+
| PLT,      | 143   |           |            |              |
| External  |       |           |            |              |
+-----------+-------+-----------+------------+--------------+
| RBC,      | 4.03  |           |            |              |
| External  |       |           |            |              |
+-----------+-------+-----------+------------+--------------+
| MCV,      | 104   |           |            |              |
| External  |       |           |            |              |
+-----------+-------+-----------+------------+--------------+
| RDW,      | 14    |           |            |              |
| External  |       |           |            |              |
+-----------+-------+-----------+------------+--------------+
 External Lab: Triglycerides (2018)
 
+-------------+-------+-----------+------------+--------------+
| Component   | Value | Ref Range | Performed  | Pathologist  |
|             |       |           | At         | Signature    |
+-------------+-------+-----------+------------+--------------+
| Triglycerid | 203   |           |            |              |
| es,         |       |           |            |              |
| External    |       |           |            |              |
+-------------+-------+-----------+------------+--------------+
 
 
 
+----------+
| Specimen |
+----------+
| Blood    |
+----------+
 External Lab: Cholesterol, HDL (2018)
 
+-------------+-------+-----------+------------+--------------+
| Component   | Value | Ref Range | Performed  | Pathologist  |
|             |       |           | At         | Signature    |
+-------------+-------+-----------+------------+--------------+
| HDL         | 50.7  | mg/dl     |            |              |
| Cholesterol |       |           |            |              |
| , External  |       |           |            |              |
+-------------+-------+-----------+------------+--------------+
 
 
 
+----------+
| Specimen |
+----------+
| Blood    |
+----------+
 External Lab: Cholesterol, Total (2018)
 
 
+-------------+-------+-----------+------------+--------------+
| Component   | Value | Ref Range | Performed  | Pathologist  |
|             |       |           | At         | Signature    |
+-------------+-------+-----------+------------+--------------+
| Cholesterol | 142   | mg/dl     |            |              |
| , Total,    |       |           |            |              |
| External    |       |           |            |              |
+-------------+-------+-----------+------------+--------------+
 
 
 
+----------+
| Specimen |
+----------+
| Blood    |
+----------+
 External Lab: Cholesterol, LDL (2018)
 
+-------------+-------+-----------+------------+--------------+
| Component   | Value | Ref Range | Performed  | Pathologist  |
|             |       |           | At         | Signature    |
+-------------+-------+-----------+------------+--------------+
| LDL         | 51    |           |            |              |
| Cholesterol |       |           |            |              |
| , Direct,   |       |           |            |              |
| External    |       |           |            |              |
+-------------+-------+-----------+------------+--------------+
 
 
 
+----------+
| Specimen |
+----------+
| Blood    |
+----------+
 External Lab: eGFR (2018)
 
+-----------+-------+-----------+------------+--------------+
| Component | Value | Ref Range | Performed  | Pathologist  |
|           |       |           | At         | Signature    |
+-----------+-------+-----------+------------+--------------+
| eGFR,     | 39    |           |            |              |
| External  |       |           |            |              |
+-----------+-------+-----------+------------+--------------+
 
 
 
+----------+
| Specimen |
+----------+
| Blood    |
+----------+
 External Lab: Creatinine (2018)
 
+-------------+-------+-----------+------------+--------------+
| Component   | Value | Ref Range | Performed  | Pathologist  |
|             |       |           | At         | Signature    |
+-------------+-------+-----------+------------+--------------+
| Creatinine, | 1.35  |           |            |              |
|  External   |       |           |            |              |
 
+-------------+-------+-----------+------------+--------------+
 
 
 
+----------+
| Specimen |
+----------+
| Blood    |
+----------+
 External Lab: Tacrolimus Level, CMIA (2018)
 
+-------------+-------+-----------+------------+--------------+
| Component   | Value | Ref Range | Performed  | Pathologist  |
|             |       |           | At         | Signature    |
+-------------+-------+-----------+------------+--------------+
| Tacrolimus, | 7.8   |           |            |              |
|  CMIA,      |       |           |            |              |
| External    |       |           |            |              |
+-------------+-------+-----------+------------+--------------+
 
 
 
+----------+
| Specimen |
+----------+
|          |
+----------+
 documented in this encounter
 
 Visit Diagnoses
 Not on filedocumented in this encounter

## 2020-01-08 NOTE — XMS
Clinical Summary
  Created on: 2020
 
 Viviana Ricci
 External Reference #: [id number]
 : 47
 Sex: Female
 
 Demographics
 
 
+-----------------------+----------------------+
| Address               | [address]      |
|                       | [place]  [address] |
+-----------------------+----------------------+
| Home Phone            | [phone]      |
+-----------------------+----------------------+
| Preferred Language    | Unknown              |
+-----------------------+----------------------+
| Marital Status        | Single               |
+-----------------------+----------------------+
| Jewish Affiliation | [id number]                 |
+-----------------------+----------------------+
| Race                  | Unknown              |
+-----------------------+----------------------+
| Ethnic Group          | Unknown              |
+-----------------------+----------------------+
 
 
 Author
 
 
+--------------+--------------------------------------------+
| Author       | [facility]  |
|              | and [facility]                                |
+--------------+--------------------------------------------+
| Organization | [facility]  |
|              | and [facility]                                |
+--------------+--------------------------------------------+
| Address      | Unknown                                    |
+--------------+--------------------------------------------+
| Phone        | Unavailable                                |
+--------------+--------------------------------------------+
 
 
 
 Support
 
 
+----------------+--------------+---------------------+-----------------+
| Name           | Relationship | Address             | Phone           |
+----------------+--------------+---------------------+-----------------+
| Ada/Ed Radhames | ECON         | [place]              | [phone] |
|                |              | [place]  |                 |
|                |              |  [id number]              |                 |
+----------------+--------------+---------------------+-----------------+
 
 
 
 
 Care Team Providers
 
 
+------------------------+------+-----------------+
| Care Team Member Name  | Role | Phone           |
+------------------------+------+-----------------+
| Marzena Moser DO | PCP  | [phone] |
+------------------------+------+-----------------+
 
 
 
 Allergies
 
 
+-----------------+-----------+----------+----------+----------+
| Active Allergy  | Reactions | Severity | Noted    | Comments |
|                 |           |          | Date     |          |
+-----------------+-----------+----------+----------+----------+
| Adhesive & Tape | Rash      | Low      | 20 |          |
|                 |           |          | 19       |          |
+-----------------+-----------+----------+----------+----------+
 
 
 
 Medications
 
 
+----------------------+----------------------+-----------+---------+------+------+-------+
| Medication           | Sig                  | Dispensed | Refills | Star | End  | Statu |
|                      |                      |           |         | t    | Date | s     |
|                      |                      |           |         | Date |      |       |
+----------------------+----------------------+-----------+---------+------+------+-------+
|   glucose blood VI   | Check blood sugar    |   100     | 12      | 11/2 |      | Activ |
| test strips (ONE     | before each meal and | each      |         | 6/20 |      | e     |
| TOUCH ULTRA TEST)    |  as directed         |           |         | 12   |      |       |
| stripIndications:    |                      |           |         |      |      |       |
| Unspecified          |                      |           |         |      |      |       |
| hypertensive kidney  |                      |           |         |      |      |       |
| disease with chronic |                      |           |         |      |      |       |
|  kidney disease      |                      |           |         |      |      |       |
| stage I through      |                      |           |         |      |      |       |
| stage IV, or         |                      |           |         |      |      |       |
| unspecified(403.90), |                      |           |         |      |      |       |
|  Complications of    |                      |           |         |      |      |       |
| transplanted kidney, |                      |           |         |      |      |       |
|  Diabetes mellitus   |                      |           |         |      |      |       |
| type II,             |                      |           |         |      |      |       |
| uncontrolled (HCC),  |                      |           |         |      |      |       |
| Hyperlipidemia       |                      |           |         |      |      |       |
+----------------------+----------------------+-----------+---------+------+------+-------+
|   Respiratory        | Decrease CPAP to     |   1 each  | 0       | 06/1 |      | Activ |
| Therapy Supplies     | 12-18 cmH2O          |           |         | 9/20 |      | e     |
| MISC                 | Diagnosis            |           |         | 13   |      |       |
|                      | Code(s)327.23.       |           |         |      |      |       |
|                      | Please send order to |           |         |      |      |       |
|                      |  [facility].     |           |         |      |      |       |
+----------------------+----------------------+-----------+---------+------+------+-------+
|   cholecalciferol    | Take 2,000 Units by  |           | 0       |      |      | Activ |
| (VITAMIN D-3) 2000   | mouth Every other    |           |         |      |      | e     |
 
| UNITS                | day.                 |           |         |      |      |       |
| TABSIndications:     |                      |           |         |      |      |       |
| Unspecified          |                      |           |         |      |      |       |
| hypertensive kidney  |                      |           |         |      |      |       |
| disease with chronic |                      |           |         |      |      |       |
|  kidney disease      |                      |           |         |      |      |       |
| stage I through      |                      |           |         |      |      |       |
| stage IV, or         |                      |           |         |      |      |       |
| unspecified(403.90), |                      |           |         |      |      |       |
|  FSGS (focal         |                      |           |         |      |      |       |
| segmental            |                      |           |         |      |      |       |
| glomerulosclerosis), |                      |           |         |      |      |       |
|  Hyperlipidemia,     |                      |           |         |      |      |       |
| Complications of     |                      |           |         |      |      |       |
| transplanted kidney  |                      |           |         |      |      |       |
+----------------------+----------------------+-----------+---------+------+------+-------+
|   loperamide         | Take 1 capsule by    |   60      | 5       | 08/2 |      | Activ |
| (ANTI-DIARRHEAL) 2   | mouth 4 times daily  | capsule   |         | 5/20 |      | e     |
| mg                   | as needed.           |           |         | 14   |      |       |
| capsuleIndications:  |                      |           |         |      |      |       |
| Unspecified          |                      |           |         |      |      |       |
| hypertensive kidney  |                      |           |         |      |      |       |
| disease with chronic |                      |           |         |      |      |       |
|  kidney disease      |                      |           |         |      |      |       |
| stage I through      |                      |           |         |      |      |       |
| stage IV, or         |                      |           |         |      |      |       |
| unspecified(403.90), |                      |           |         |      |      |       |
|  FSGS (focal         |                      |           |         |      |      |       |
| segmental            |                      |           |         |      |      |       |
| glomerulosclerosis), |                      |           |         |      |      |       |
|  Hypothyroidism,     |                      |           |         |      |      |       |
| Hyperlipidemia       |                      |           |         |      |      |       |
+----------------------+----------------------+-----------+---------+------+------+-------+
|   aspirin 81 mg EC   | Take 81 mg by mouth  |           | 0       |      |      | Activ |
| tablet               | Daily.               |           |         |      |      | e     |
+----------------------+----------------------+-----------+---------+------+------+-------+
|   insulin lispro     | inject               |   10 vial | 11      | 11/ |      | Activ |
| (HUMALOG) 100        | subcutaneously       |           |         | 5/20 |      | e     |
| units/mL injection   | BEFORE MEALS         |           |         | 17   |      |       |
| (vial)Indications:   | ACCORDING TO SLIDING |           |         |      |      |       |
| Type 2 diabetes      |  SCALE Max dose 20   |           |         |      |      |       |
| mellitus with        | units daily          |           |         |      |      |       |
| complication, with   |                      |           |         |      |      |       |
| long-term current    |                      |           |         |      |      |       |
| use of insulin (HCC) |                      |           |         |      |      |       |
+----------------------+----------------------+-----------+---------+------+------+-------+
 
 
 
+---+----------------------+
|   | Additional           |
|   | informationPatient   |
|   | taking differently:  |
|   | inject               |
|   | subcutaneously       |
|   | BEFORE MEALS         |
|   | ACCORDING TO SLIDING |
|   |  SCALE Max dose 8    |
|   | units daily,         |
|   | Reported on          |
 
|   | 2019 11:31 AM  |
+---+----------------------+
 
 
 
+----------------------+----------------------+----------+----+------+---+-------+
|   lisinopril         | take 1 tablet by     |   90     | 3  |  |   | Activ |
| (PRINIVIL,ZESTRIL)   | mouth once daily     | tablet   |    |  |   | e     |
| 30 MG tablet         |                      |          |    | 17   |   |       |
+----------------------+----------------------+----------+----+------+---+-------+
|   allopurinol        | take 1 tablet by     |   30     | 11 | 01/1 |   | Activ |
| (ZYLOPRIM) 100 mg    | mouth once daily     | tablet   |    | 20 |   | e     |
| tablet               |                      |          |    | 18   |   |       |
+----------------------+----------------------+----------+----+------+---+-------+
|   losartan (COZAAR)  | take 1 tablet by     |   90     | 3  | 01/2 |   | Activ |
| 50 mg tablet         | mouth once daily     | tablet   |    |  |   | e     |
|                      |                      |          |    | 18   |   |       |
+----------------------+----------------------+----------+----+------+---+-------+
|   Prenatal Vit-Fe    | take 1 tablet by     |   30     | 11 | 08/0 |   | Activ |
| Fumarate-FA (PNV     | mouth once daily.    | tablet   |    | 3/20 |   | e     |
| PRENATAL PLUS        |                      |          |    | 18   |   |       |
| MULTIVITAMIN) 27-1   |                      |          |    |      |   |       |
| MG TABS              |                      |          |    |      |   |       |
+----------------------+----------------------+----------+----+------+---+-------+
|   Respiratory        | Continue nocturnal   |   1 each | 0  | 08/ |   | Activ |
| Therapy Supplies     | O2 at 2 l/m through  |          |    | 20 |   | e     |
| MISCIndications: JACK | CPAP for lifetime.   |          |    | 18   |   |       |
|  (obstructive sleep  | Dx: JACK G47.33       |          |    |      |   |       |
| apnea)               | Please provide new   |          |    |      |   |       |
|                      | nasal mask for CPAP  |          |    |      |   |       |
|                      | and any other needed |          |    |      |   |       |
|                      |  replacement         |          |    |      |   |       |
|                      | supplies.            |          |    |      |   |       |
+----------------------+----------------------+----------+----+------+---+-------+
|   levothyroxine      | take 1 tablet by     |   30     | 11 | 08/3 |   | Activ |
| (SYNTHROID) 50 mcg   | mouth once daily     | tablet   |    | 1/20 |   | e     |
| tablet               |                      |          |    | 18   |   |       |
+----------------------+----------------------+----------+----+------+---+-------+
|   rosuvastatin       | take 1 tablet by     |   30     | 11 | 08/3 |   | Activ |
| (CRESTOR) 20 mg      | mouth NIGHTLY        | tablet   |    | 1/20 |   | e     |
| tablet               |                      |          |    | 18   |   |       |
+----------------------+----------------------+----------+----+------+---+-------+
|   furosemide (LASIX) | Take 1 tablet by     |   30     | 11 | 08/3 |   | Activ |
|  40 mg               | mouth Daily as       | tablet   |    | 1/20 |   | e     |
| tabletIndications:   | needed.              |          |    | 18   |   |       |
| Edema, unspecified   |                      |          |    |      |   |       |
| type, FSGS (focal    |                      |          |    |      |   |       |
| segmental            |                      |          |    |      |   |       |
| glomerulosclerosis), |                      |          |    |      |   |       |
|  Hyperlipidemia      |                      |          |    |      |   |       |
+----------------------+----------------------+----------+----+------+---+-------+
|   B-D INS SYRINGE    | USE BEFORE MEALS AS  |   1 each | 11 | 09/0 |   | Activ |
| 0.5CC/31GX5/16 31G X | DIRECTED             |          |    |  |   | e     |
|  16" 0.5 ML MISC   |                      |          |    | 18   |   |       |
+----------------------+----------------------+----------+----+------+---+-------+
|   magnesium oxide    | take 1 tablet by     |   60     | 11 | 10/0 |   | Activ |
| (MAG-OX) 400 mg      | mouth twice a day    | tablet   |    | 2/20 |   | e     |
| tablet               |                      |          |    | 18   |   |       |
+----------------------+----------------------+----------+----+------+---+-------+
|   predniSONE         | take 1 tablet by     |   90     | 3  | 10/0 |   | Activ |
 
| (DELTASONE) 5 mg     | mouth once daily     | tablet   |    | 2/20 |   | e     |
| tablet               |                      |          |    | 18   |   |       |
+----------------------+----------------------+----------+----+------+---+-------+
 
 
 
+---+----------------------+
|   | Additional           |
|   | informationPatient   |
|   | not taking. Reported |
|   |  on 2019  2:33 |
|   |  PM                  |
+---+----------------------+
 
 
 
+----------------------+----------------------+-----------+----+------+---+-------+
|   insulin glargine   | inject 20 units      |   10 vial | 11 | 11/0 |   | Activ |
| (LANTUS) 100         | subcutaneously every |           |    | 9/20 |   | e     |
| units/mL injection   |  morning             |           |    | 18   |   |       |
| (vial)Indications:   |                      |           |    |      |   |       |
| Type 2 diabetes      |                      |           |    |      |   |       |
| mellitus with        |                      |           |    |      |   |       |
| chronic kidney       |                      |           |    |      |   |       |
| disease, without     |                      |           |    |      |   |       |
| long-term current    |                      |           |    |      |   |       |
| use of insulin,      |                      |           |    |      |   |       |
| unspecified CKD      |                      |           |    |      |   |       |
| stage (HCC), Kidney  |                      |           |    |      |   |       |
| replaced by          |                      |           |    |      |   |       |
| transplant           |                      |           |    |      |   |       |
+----------------------+----------------------+-----------+----+------+---+-------+
|   cinacalcet         | take 1 tablet by     |   90      | 3  | 03/0 |   | Activ |
| (SENSIPAR) 30 mg     | mouth once daily     | tablet    |    | /20 |   | e     |
| tablet               |                      |           |    | 19   |   |       |
+----------------------+----------------------+-----------+----+------+---+-------+
|   fludrocortisone    | Take 1 tablet by     |   45      | 3  | 07/0 |   | Activ |
| (FLORINEF) 0.1 mg    | mouth Every other    | tablet    |    | 20 |   | e     |
| tablet               | day.                 |           |    | 19   |   |       |
+----------------------+----------------------+-----------+----+------+---+-------+
|   cyclobenzaprine    | Take 1 tablet by     |   45      | 0  | 07/3 |   | Activ |
| (FLEXERIL) 10 mg     | mouth Twice  daily   | tablet    |    | 20 |   | e     |
| tablet               | as needed for Muscle |           |    | 19   |   |       |
|                      |  spasms.             |           |    |      |   |       |
+----------------------+----------------------+-----------+----+------+---+-------+
 
 
 
+---+----------------------+
|   | Additional           |
|   | informationPatient   |
|   | not taking. Reported |
|   |  on 2019  2:33 |
|   |  PM                  |
+---+----------------------+
 
 
 
+----------------------+----------------------+---------+----+------+---+-------+
|   apixaban (ELIQUIS) | Take 1 tablet by     |   180   | 3  |  |   | Activ |
 
|  5 mg tablet         | mouth 2 times daily. | tablet  |    | 3/20 |   | e     |
|                      |                      |         |    | 19   |   |       |
+----------------------+----------------------+---------+----+------+---+-------+
|   tacrolimus         | Take 1 capsule by    |   60    | 11 |  |   | Activ |
| (PROGRAF) 1 mg       | mouth twice daily.   | capsule |    | /20 |   | e     |
| capsuleIndications:  |                      |         |    | 19   |   |       |
| Kidney replaced by   |                      |         |    |      |   |       |
| transplant           |                      |         |    |      |   |       |
+----------------------+----------------------+---------+----+------+---+-------+
 
 
 
+---+----------------------+
|   | Additional           |
|   | informationPatient   |
|   | not taking. Reported |
|   |  on 2019  2:33 |
|   |  PM                  |
+---+----------------------+
 
 
 
+----------------------+----------------------+---------+----+------+---+-------+
|   mycophenolate      | Take 1 capsule by    |   60    | 11 | 12/2 |   | Activ |
| (CELLCEPT) 250 mg    | mouth 2 times daily. | capsule |    | 0/20 |   | e     |
| capsuleIndications:  |                      |         |    | 19   |   |       |
| Kidney replaced by   |                      |         |    |      |   |       |
| transplant           |                      |         |    |      |   |       |
+----------------------+----------------------+---------+----+------+---+-------+
 
 
 
+---+----------------------+
|   | Additional           |
|   | informationPatient   |
|   | not taking. Reported |
|   |  on 2019  2:33 |
|   |  PM                  |
+---+----------------------+
 
 
 
+----------------------+----------------------+---------+----+------+------+-------+
|   mycophenolate      | Take 1 capsule by    |   60    | 11 | 12/0 | 12/2 | Disco |
| (CELLCEPT) 250 mg    | mouth 2 times daily. | capsule |    |  | 0/20 | ntinu |
| capsuleIndications:  |                      |         |    | 18   | 19   | ed    |
| Kidney replaced by   |                      |         |    |      |      |       |
| transplant           |                      |         |    |      |      |       |
+----------------------+----------------------+---------+----+------+------+-------+
 
 
 
 Active Problems
 
 
+-------------------------------------------------------------------+------------+
| Problem                                                           | Noted Date |
+-------------------------------------------------------------------+------------+
| NSVT (nonsustained ventricular tachycardia) (HCC) and ventricular | 2019 |
|  ectopy                                                           |            |
 
+-------------------------------------------------------------------+------------+
 
 
 
+---------------------------------------------------------------+
|   Overview:   Noted on mobile cardiac telemetry from 10/2019. |
+---------------------------------------------------------------+
 
 
 
+--------------------------------+------------+
| Persistent atrial fibrillation | 2019 |
+--------------------------------+------------+
 
 
 
+-------------------------------------------------------------------+
|   Overview:   Mobile cardiac telemetry from 10/16 until           |
| 10/30/2019. Baseline EKG show atrial fibrillation with heart rate |
|  ranging from 33 to 149 bpm. PVCs, ventricular couplets, 4 beat   |
| run nonsustained ventricular tachycardia was noted.   Episodes of |
|  rapid ventricular response with heart rate of149 bpm  was        |
| noted.Pauses up to 3.8-second were present. Findings suggest      |
| potential tacky/bradycardia syndrome.                             |
+-------------------------------------------------------------------+
 
 
 
+---------------------------------------------+------------+
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
+---------------------------------------------+------------+
| Thyroid activity decreased                  | 2016 |
+---------------------------------------------+------------+
| Renal transplant recipient                  | 2016 |
+---------------------------------------------+------------+
| UTI (lower urinary tract infection)         | 2015 |
+---------------------------------------------+------------+
 
 
 
+---------------------------------------------+
|   Overview:   Problem list replacer utility |
+---------------------------------------------+
 
 
 
+----------------------------------------+------------+
| PLMD (periodic limb movement disorder) | 2014 |
+----------------------------------------+------------+
 
 
 
+-------------------------------------------------------------------+
|   Overview:   PLMD index 30 on sleep study. Unclear if addressed. |
|                                                                   |
+-------------------------------------------------------------------+
 
 
 
+-------------------------+------------+
 
| Murmur                  | 04/15/2014 |
+-------------------------+------------+
| Cardiomegaly            | 04/15/2014 |
+-------------------------+------------+
| Hypertension, essential | 04/15/2014 |
+-------------------------+------------+
| Dyspnea                 | 2013 |
+-------------------------+------------+
 
 
 
+-------------------------------------------------------------------+
|   Overview:   Negative methacholine 13Echo 13 showed    |
| hypervolemia and could not assess pulmonary pressures.We have not |
|  done: Repeat chest CT or stress test. Chest CT done with         |
| previous Dr. Pham work up 3 years ago. Last stress test ?     |
+-------------------------------------------------------------------+
 
 
 
+--------------------------------------+---+
| Chronic low back pain                |   |
+--------------------------------------+---+
| Hypothyroidism                       |   |
+--------------------------------------+---+
| Mixed hyperlipidemia                 |   |
+--------------------------------------+---+
| Complications of transplanted kidney |   |
+--------------------------------------+---+
 
 
 
+------------------------------------+
|   Overview:   ICD-10 Record update |
+------------------------------------+
 
 
 
+-------------------+---+
| Movement disorder |   |
+-------------------+---+
 
 
 
+------------------------------------------+
|   Overview:   Tremor of unclear etiology |
+------------------------------------------+
 
 
 
+-----------------------------------------+---+
| Diabetes mellitus type II, uncontrolled |   |
+-----------------------------------------+---+
| Pulmonary hypertension                  |   |
+-----------------------------------------+---+
 
 
 
+-------------------------------------------------------------------+
|   Overview:   Echocardiogram from 02/19/10 revealed moderate      |
 
| bi-atrial dilatation and normal left ventricular size, wall       |
| thickness and motion, LVEF is 55-60%, dilated right ventricle     |
| with moderate hypo-kinesis, moderate tricuspid valve              |
| regurgitation, severe pulmonary hypertension with a peak systolic |
|  pressure of 80 mmHg, and a small pericardial effusion.           |
| Echocardiogram from 4/15/14 showed Mild left atrial dilatation.   |
| Normal left ventricular size, wall thickness and motion.          |
| Preserved left ventricular systolic function. LVEF is 70-75%.     |
| Grade 1 left ventricular diastole dysfunction. Mild tricuspid     |
| valve regurgitation. Mild thickened trileaflet aortic valve with  |
| adequate opening. A mild aortic valve insufficiency. Normal       |
| right-sided pressure.                                             |
+-------------------------------------------------------------------+
 
 
 
+--------------------------------------------------------------+---+
| Coronary artery disease involving native coronary artery of  |   |
| native heart without angina pectoris                         |   |
+--------------------------------------------------------------+---+
 
 
 
+-------------------------------------------------------------------+
|   Overview:   Formatting of this note might be different from the |
|  original.Status post CABG x 3 in  with LIMA to the LAD, SVG  |
| to the OM1 and OM2. Persantine sestamibi stress test on 07  |
| revealed a medium sized, moderate in severity fixed defect of the |
|  anterior wall, and a small sized mild in severity fixed defect   |
| of the inferior wall, LVEF by gated SPECT was 66%. Echo test      |
| completed on 10/30/191.  Mild biatrial dilatation.2.  Normal left |
|  ventricular size with a mild concentric left ventricular         |
| hypertrophy.  Left ventricular systolic function is preserved.    |
| LVEF is 55-60%.3.  Mildly thickened and calcified trileaflet      |
| aortic valve with adequate opening.  There is aortic valve        |
| sclerosis without significant aortic valve stenosis.4.  Mildly    |
| thickened and calcified mitral valve suggesting myxomatous        |
| change.  There is a mild mitral valve regurgitation.5.  Mild      |
| mitral annular calcification.6.  Mild tricuspid valve             |
| regurgitation.7.  Normal right-sided pressure.  8.  Dilated IVC   |
| with a normal respiratory collapse.9.  When compared to           |
| echocardiography on 2018, no significant changes.Bilateral   |
| heart catheterization on 05/03/10, shows severe two vessel        |
| coronary artery disease, a chronic occlusion through the proximal |
|  portion of the left anterior descending artery and a chronic     |
| occlusion through the proximal portion of the left circumflex     |
| artery, grafts: open left internal mammary artery graft to the    |
| mid left anterior descending artery, open saphenous vein grafts   |
| to the OM1 and OM2, mildly dilated left ventricular cavity with a |
|  normal left systolic function, LVEF 70%, mild to moderate        |
| pulmonary hypertension and a normal cardiac output, coronary      |
| circulation is right dominant, normal system pressure. Persantine |
|  nuclear medicine stress test 14 shows a normal myocardial   |
| perfusion imaging study with normal left ventricular size, wall   |
| thickness, LVEF by gated SPECT of 78%. Stress test completed on   |
| 10/30/2019 Persantine EKG is negative.2.  Normal Persantine       |
| sestamibi myocardial perfusion imaging study.  Normal left        |
| ventricular size, wall thickness and motion.  Preserved left      |
| ventricular systolic function.  LVEF by gated SPECT is 63%. NM    |
| Nuclear Stress Test 10/30/2019: Persantine EKG is negative.       |
 
| Normal Persantine sestamibi myocardial perfusion imaging study.   |
| Normal left ventricular size, wall thickness and motion.          |
| Preserved left ventricular systolic function.  LVEF by gated      |
| SPECT is 63%.Echocardiogram Completed 10/30/2019: Mild biatrial   |
| dilatation.  Normal left ventricular size with a mild concentric  |
| left ventricular hypertrophy.  Left ventricular systolic function |
|  is preserved.  LVEF is 55-60%.  Mildly thickened and calcified   |
| trileaflet aortic valve with adequate opening.  There is aortic   |
| valve sclerosis without significant aortic valve stenosis.        |
| Mildly thickened and calcified mitral valve suggesting myxomatous |
|  change.  There is a mild mitral valve regurgitation.  Mild       |
| mitral annular calcification.  Mild tricuspid valve               |
| regurgitation.  Normal right-sided pressure. Dilated IVC with a   |
| normal respiratory collapse.  When compared to echocardiography   |
| on 2018, no significant changes.                             |
+-------------------------------------------------------------------+
 
 
 
+-------------+---+
| JACK on CPAP |   |
+-------------+---+
 
 
 
+------------------------------------------------------+
|   Overview:   AHI 67.9                               |
| On ResMed S9 auto CPAP [id number] with 2LPM bleed in |
+------------------------------------------------------+
 
 
 
+-------------------------------------------+---+
| Obesity hypoventilation syndrome          |   |
+-------------------------------------------+---+
| Kidney replaced by transplant             |   |
+-------------------------------------------+---+
| Secondary hyperparathyroidism             |   |
+-------------------------------------------+---+
| FSGS (focal segmental glomerulosclerosis) |   |
+-------------------------------------------+---+
| Chest pain                                |   |
+-------------------------------------------+---+
 
 
 
+-------------------------------------------------------------------+
|   Overview:   Nuclear stress test 2014 Persantine EKG is     |
| negative. Normal Persantine Sestamibi myocardial perfusion study  |
| with a normal left ventricular size and wall thickness. Preserved |
|  left ventricular systolic function. LVEF by gated SPECT 78%.     |
+-------------------------------------------------------------------+
 
 
 
 Resolved Problems
 
 
+--------------------------------------------------------------+----------+-----------+
| Problem                                                      | Noted    | Resolved  |
 
|                                                              | Date     | Date      |
+--------------------------------------------------------------+----------+-----------+
| Hypervolemia                                                 | 20 |  |
|                                                              | 13       | 4         |
+--------------------------------------------------------------+----------+-----------+
| Cough                                                        | 20 |  |
|                                                              | 13       | 4         |
+--------------------------------------------------------------+----------+-----------+
| Unspecified hypertensive kidney disease with chronic kidney  | 20 |  |
| disease stage I through stage IV, or unspecified(403.90)     | 12       | 5         |
+--------------------------------------------------------------+----------+-----------+
 
 
 
 Encounters
 
 
+--------+-------------+-------------+----------------------+----------------------+
| Date   | Type        | Specialty   | Care Team            | Description          |
+--------+-------------+-------------+----------------------+----------------------+
| / | Office      | Nephrology  |   Marzena Moser  | Kidney replaced by   |
| 2019   | Visit       |             | M, DO                | transplant (Primary  |
|        |             |             |                      | Dx); Hypertension,   |
|        |             |             |                      | essential;           |
|        |             |             |                      | Persistent atrial    |
|        |             |             |                      | fibrillation; Type 2 |
|        |             |             |                      |  DM with CKD stage 2 |
|        |             |             |                      |  and hypertension    |
|        |             |             |                      | ([facility])                |
+--------+-------------+-------------+----------------------+----------------------+
| / | Refill      | Nephrology  |   Marzena Moser  | Medication Refill    |
|    |             |             | M, DO                |                      |
+--------+-------------+-------------+----------------------+----------------------+
| / | Abstract    | Nephrology  |   Marzena Moser  |                      |
| 2019   |             |             | M, DO                |                      |
+--------+-------------+-------------+----------------------+----------------------+
| / | Orders Only | Cardiology  |   Popeye Townsend, | Pulmonary            |
| 2019   |             |             |  MD                  | hypertension (HCC)   |
|        |             |             |                      | (Primary Dx);        |
|        |             |             |                      | Shortness of breath  |
+--------+-------------+-------------+----------------------+----------------------+
| / | Refill      | Nephrology  |   Marzena Moser  | Medication Refill    |
|    |             |             | M, DO                |                      |
+--------+-------------+-------------+----------------------+----------------------+
| / | Office      | Cardiology  |   Tonia Wilson,    | Pulmonary            |
|    | Visit       |             | ARNP                 | hypertension ([facility])   |
|        |             |             |                      | (Primary Dx);        |
|        |             |             |                      | Coronary artery      |
|        |             |             |                      | disease involving    |
|        |             |             |                      | native coronary      |
|        |             |             |                      | artery of native     |
|        |             |             |                      | heart without angina |
|        |             |             |                      |  pectoris; Murmur;   |
|        |             |             |                      | Cardiomegaly;        |
|        |             |             |                      | Hypertension,        |
|        |             |             |                      | essential; Chest     |
|        |             |             |                      | pain, unspecified    |
|        |             |             |                      | type; Mixed          |
|        |             |             |                      | hyperlipidemia;      |
|        |             |             |                      | Persistent atrial    |
 
|        |             |             |                      | fibrillation; NSVT   |
|        |             |             |                      | (nonsustained        |
|        |             |             |                      | ventricular          |
|        |             |             |                      | tachycardia) ([facility])   |
|        |             |             |                      | and ventricular      |
|        |             |             |                      | ectopy               |
+--------+-------------+-------------+----------------------+----------------------+
| 10/31/ | Documentati | Nephrology  |   Marzena Moser  |                      |
|    | on          |             | M, DO                |                      |
+--------+-------------+-------------+----------------------+----------------------+
| 10/31/ | Telephone   | Cardiology  |   Tonia Wilson,    | Results              |
|    |             |             | ARNP                 |                      |
+--------+-------------+-------------+----------------------+----------------------+
| 10/30/ | Hospital    | Radiology   |   Tonia Wilson,    | Coronary artery      |
|    | Encounter   |             | ARNP                 | disease involving    |
|        |             |             |                      | native coronary      |
|        |             |             |                      | artery of native     |
|        |             |             |                      | heart without angina |
|        |             |             |                      |  pectoris;           |
|        |             |             |                      | Hypertension,        |
|        |             |             |                      | essential; Mixed     |
|        |             |             |                      | hyperlipidemia; PVC  |
|        |             |             |                      | (premature           |
|        |             |             |                      | ventricular          |
|        |             |             |                      | contraction)         |
+--------+-------------+-------------+----------------------+----------------------+
| 10/30/ | Hospital    | Radiology   |   Tonia Wilson,    |                      |
|    | Encounter   |             | ARNP                 |                      |
+--------+-------------+-------------+----------------------+----------------------+
| 10/30/ | Hospital    | Radiology   |   Tonia Wilson,    | Coronary artery      |
|    | Encounter   |             | ARNP  Cardiologist,  | disease involving    |
|        |             |             | [facility]                  | native coronary      |
|        |             |             |                      | artery of native     |
|        |             |             |                      | heart without angina |
|        |             |             |                      |  pectoris;           |
|        |             |             |                      | Hypertension,        |
|        |             |             |                      | essential; Mixed     |
|        |             |             |                      | hyperlipidemia       |
+--------+-------------+-------------+----------------------+----------------------+
| 10/29/ | Telephone   | Cardiology  |   Tonia Wilson,    | Patrice (montior and   |
2019   |             |             | ARNP                 | test)                |
+--------+-------------+-------------+----------------------+----------------------+
| 10/24/ | Telephone   | Cardiology  |   Tonia Wilson,    | Other (stop          |
2019   |             |             | ARNP                 | metoprolol due to    |
|        |             |             |                      | pauses /             |
|        |             |             |                      | bradycardia)         |
+--------+-------------+-------------+----------------------+----------------------+
| 10/21/ | Telephone   | Cardiology  |   Tonia Wilson,    | Other (Pt qualified  |
2019   |             |             | ARNP                 | for Pulm Rehab )     |
+--------+-------------+-------------+----------------------+----------------------+
| 10/21/ | Telephone   | Cardiology  |   Popeye Townsend, | Other (monitor       |
2019   |             |             |  MD                  | report )             |
+--------+-------------+-------------+----------------------+----------------------+
| 10/16/ | Hospital    | Radiology   |   Tonia Wilson,    | Coronary artery      |
| 2019   | Encounter   |             | ARNP                 | disease involving    |
|        |             |             |                      | native coronary      |
|        |             |             |                      | artery of native     |
|        |             |             |                      | heart without angina |
|        |             |             |                      |  pectoris;           |
|        |             |             |                      | Hypertension,        |
 
|        |             |             |                      | essential; Mixed     |
|        |             |             |                      | hyperlipidemia; PVC  |
|        |             |             |                      | (premature           |
|        |             |             |                      | ventricular          |
|        |             |             |                      | contraction);        |
|        |             |             |                      | Permanent atrial     |
|        |             |             |                      | fibrillation         |
+--------+-------------+-------------+----------------------+----------------------+
| 10/15/ | Telephone   | Cardiology  |   Tonia Wilson,    | Other                |
|    |             |             | ARNP                 |                      |
+--------+-------------+-------------+----------------------+----------------------+
| 10/14/ | Telephone   | Pulmonology |   Heydi Kirkland         | Appointment          |
|    |             |             | MD Onofre          |                      |
+--------+-------------+-------------+----------------------+----------------------+
| 10/10/ | Office      | Cardiology  |   Tonia Wilson,    | Coronary artery      |
| 2019   | Visit       |             | ARNP                 | disease involving    |
|        |             |             |                      | native coronary      |
|        |             |             |                      | artery of native     |
|        |             |             |                      | heart without angina |
|        |             |             |                      |  pectoris (Primary   |
|        |             |             |                      | Dx); Hypertension,   |
|        |             |             |                      | essential; Mixed     |
|        |             |             |                      | hyperlipidemia; PVC  |
|        |             |             |                      | (premature           |
|        |             |             |                      | ventricular          |
|        |             |             |                      | contraction)         |
+--------+-------------+-------------+----------------------+----------------------+
| 10/10/ | Orders Only | Cardiology  |   Popeye Townsend, | CHF (congestive      |
| 2019   |             |             |  MD                  | heart failure), NYHA |
|        |             |             |                      |  class I, chronic,   |
|        |             |             |                      | diastolic (HCC)      |
|        |             |             |                      | (Primary Dx)         |
+--------+-------------+-------------+----------------------+----------------------+
 from Last 3 Months
 
 Immunizations
 
 
+----------------------+----------------------+----------+
| Name                 | Administration Dates | Next Due |
+----------------------+----------------------+----------+
| INFLUENZA PF 18 Y OR | 2012           |          |
|  >,TRIVALENT         |                      |          |
| RECOMBINANT          |                      |          |
+----------------------+----------------------+----------+
| INFLUENZA TRIV       | 2009           |          |
| W/PRES(PED/ADOL/AUGIE |                      |          |
| T),MULTIDOSE         |                      |          |
+----------------------+----------------------+----------+
 
 
 
 Family History
 
 
+---------------------+----------+------+-------------------------------------------+
| Medical History     | Relation | Name | Comments                                  |
+---------------------+----------+------+-------------------------------------------+
| Other (see comment) | Brother  |      | paralyzed in accident and then  from  |
|                     |          |      | complications                             |
 
+---------------------+----------+------+-------------------------------------------+
| Heart disease       | Father   |      |                                           |
+---------------------+----------+------+-------------------------------------------+
| Parkinsonism        | Father   |      |                                           |
+---------------------+----------+------+-------------------------------------------+
| Ovarian cancer      | Mother   |      |                                           |
+---------------------+----------+------+-------------------------------------------+
 
 
 
+----------+-------+----------+---------------------+
| Relation | Name  | Status   | Comments            |
+----------+-------+----------+---------------------+
| Brother  | Deric   |  | Car accident        |
|          |       |   (Age   |                     |
|          |       | 58)      |                     |
+----------+-------+----------+---------------------+
| Brother  | Ed    | Alive    | Healthy             |
+----------+-------+----------+---------------------+
| Brother  | [place] |  | Parkinson's disease |
|          |       |   (Age   |                     |
|          |       | 72)      |                     |
+----------+-------+----------+---------------------+
| Brother  |       |          |                     |
+----------+-------+----------+---------------------+
| Father   |       |  | Parkinson's, CHF    |
|          |       |   (Age   |                     |
|          |       | 67)      |                     |
+----------+-------+----------+---------------------+
| Mother   |       |  | Uterine cancer      |
|          |       |   (Age   |                     |
|          |       | 83)      |                     |
+----------+-------+----------+---------------------+
 
 
 
 Social History
 
 
+--------------+-------+-----------+--------+------+
| Tobacco Use  | Types | Packs/Day | Years  | Date |
|              |       |           | Used   |      |
+--------------+-------+-----------+--------+------+
| Never Smoker |       |           |        |      |
+--------------+-------+-----------+--------+------+
 
 
 
+---------------------+---+---+---+
| Smokeless Tobacco:  |   |   |   |
| Never Used          |   |   |   |
+---------------------+---+---+---+
 
 
 
+---------------------------------------------------------------+
| Comments: some second hand smoke exposure, but fairly minimal |
+---------------------------------------------------------------+
 
 
 
 
+-------------+-------------+---------+----------+
| Alcohol Use | Drinks/Week | oz/Week | Comments |
+-------------+-------------+---------+----------+
| No          |             |         |          |
+-------------+-------------+---------+----------+
 
 
 
+------------------+---------------+
| Sex Assigned at  | Date Recorded |
| Birth            |               |
+------------------+---------------+
| Not on file      |               |
+------------------+---------------+
 
 
 
+----------------+-------------+-------------+
| Job Start Date | Occupation  | Industry    |
+----------------+-------------+-------------+
| Not on file    | Not on file | Not on file |
+----------------+-------------+-------------+
 
 
 
+----------------+--------------+------------+
| Travel History | Travel Start | Travel End |
+----------------+--------------+------------+
 
 
 
+-------------------------------------+
| No recent travel history available. |
+-------------------------------------+
 
 
 
 Last Filed Vital Signs
 
 
+-------------------+-------------------+----------------------+----------+
| Vital Sign        | Reading           | Time Taken           | Comments |
+-------------------+-------------------+----------------------+----------+
| Blood Pressure    | 150/80            | 2019  3:00 PM  |          |
|                   |                   | PST                  |          |
+-------------------+-------------------+----------------------+----------+
| Pulse             | 68                | 2019  3:00 PM  | apical   |
|                   |                   | PST                  |          |
+-------------------+-------------------+----------------------+----------+
| Temperature       | 35.6   C (96   F) | 2019  3:00 PM  |          |
|                   |                   | PST                  |          |
+-------------------+-------------------+----------------------+----------+
| Respiratory Rate  | 18                | 2019 11:44 AM  |          |
|                   |                   | PST                  |          |
+-------------------+-------------------+----------------------+----------+
| Oxygen Saturation | 96%               | 2019 11:44 AM  |          |
|                   |                   | PST                  |          |
+-------------------+-------------------+----------------------+----------+
| Inhaled Oxygen    | -                 | -                    |          |
 
| Concentration     |                   |                      |          |
+-------------------+-------------------+----------------------+----------+
| Weight            | 115.7 kg (255 lb) | 2019  3:00 PM  |          |
|                   |                   | PST                  |          |
+-------------------+-------------------+----------------------+----------+
| Height            | 167.6 cm (5' 6")  | 2019 11:44 AM  |          |
|                   |                   | PST                  |          |
+-------------------+-------------------+----------------------+----------+
| Body Mass Index   | 41.16             | 2019 11:44 AM  |          |
|                   |                   | PST                  |          |
+-------------------+-------------------+----------------------+----------+
 
 
 
 Plan of Treatment
 
 
+--------+-----------+------------+----------------------+-------------------+
| Date   | Type      | Specialty  | Care Team            | Description       |
+--------+-----------+------------+----------------------+-------------------+
| / | Office    | Cardiology |   Tonia Wilson,    |                   |
|    | Visit     |            | ARNJESSICA  [address]   |                   |
|        |           |            | St  [place], [place]  |                   |
|        |           |            | [address]  [phone]  |                   |
|        |           |            |  [phone] (Fax)  |                   |
+--------+-----------+------------+----------------------+-------------------+
| / | Hospital  | Radiology  |   Popeye Townsend, |                   |
|    | Encounter |            |  MD  [address] |                   |
|        |           |            |  St.  [place],   |                   |
|        |           |            | [place] [address]             |                   |
|        |           |            | [phone]         |                   |
|        |           |            | [phone] (Fax)   |                   |
+--------+-----------+------------+----------------------+-------------------+
| / | Surgery   | Radiology  |   Popeye Townsend, | CV EP PPM SYSTEM  |
|    |           |            |  MD  [address] | IMPLANT           |
|        |           |            |  St.  [place],   |                   |
|        |           |            | [place] [address]             |                   |
|        |           |            | [phone]         |                   |
|        |           |            | [phone] (Fax)   |                   |
+--------+-----------+------------+----------------------+-------------------+
| 02/10/ | Clinical  | Cardiology |                      |                   |
|    | Support   |            |                      |                   |
+--------+-----------+------------+----------------------+-------------------+
| / | Office    | Cardiology |   Tonia Wilson,    |                   |
|    | Visit     |            | [place] [address]   |                   |
|        |           |            | [place]  |                   |
|        |           |            | [address]  [phone]  |                   |
|        |           |            |  [phone] (Fax)  |                   |
+--------+-----------+------------+----------------------+-------------------+
| / | Off-Site  | Nephrology |   Marzena Moser  |                   |
2020   | Visit     |            | DO ETIENNE  [address] [place] [address]      |                   |
|        |           |            | [address]      |                   |
|        |           |            | [place]      |                   |
|        |           |            | [address]  [phone]  |                   |
|        |           |            |  [phone] (Fax)  |                   |
+--------+-----------+------------+----------------------+-------------------+
 
 
 
+----------------------+-----------+---------------------------------+----------+
 
| Health Maintenance   | Due Date  | Last Done                       | Comments |
+----------------------+-----------+---------------------------------+----------+
| Hepatitis C          |  |                                 |          |
| Screening            | 6         |                                 |          |
+----------------------+-----------+---------------------------------+----------+
| Vaccine:             |  |                                 |          |
| Dtap/Tdap/Td (1 -    | 7         |                                 |          |
| Tdap)                |           |                                 |          |
+----------------------+-----------+---------------------------------+----------+
| Diabetic Eye Exam    |  |                                 |          |
|                      | 4         |                                 |          |
+----------------------+-----------+---------------------------------+----------+
| Diabetic Foot Exam   |  |                                 |          |
|                      | 4         |                                 |          |
+----------------------+-----------+---------------------------------+----------+
| Colorectal Cancer    |  |                                 |          |
| Screening            | 6         |                                 |          |
| (Colonoscopy)        |           |                                 |          |
+----------------------+-----------+---------------------------------+----------+
| Vaccine: Zoster (1   |  |                                 |          |
| of 2)                | 6         |                                 |          |
+----------------------+-----------+---------------------------------+----------+
| Breast Cancer        |  | 2000                      |          |
| Screening            | 2         |                                 |          |
+----------------------+-----------+---------------------------------+----------+
| Vaccine:             |  |                                 |          |
| Pneumococcal 65+ (1  | 1         |                                 |          |
| of 2 - PCV13)        |           |                                 |          |
+----------------------+-----------+---------------------------------+----------+
| Adult Annual         |  |                                 |          |
| Wellness Visit       | 5         |                                 |          |
+----------------------+-----------+---------------------------------+----------+
| Vaccine: Influenza   |  | 2012, 2009          |          |
| (#1)                 | 9         |                                 |          |
+----------------------+-----------+---------------------------------+----------+
| Hemoglobin A1c       |  | 2019, 2019,         |          |
| Screening            | 0         | 2019, Additional history  |          |
|                      |           | exists                          |          |
+----------------------+-----------+---------------------------------+----------+
 
 
 
 Procedures
 
 
+----------------------+--------+-------------+----------------------+----------------------
+
| Procedure Name       | Priori | Date/Time   | Associated Diagnosis | Comments             
|
|                      | ty     |             |                      |                      
|
+----------------------+--------+-------------+----------------------+----------------------
+
| LABS - EXTERNAL SCAN |        | 2019  |                      |   Results for this   
|
|                      |        | 12:00 AM    |                      | procedure are in the 
|
|                      |        | PST         |                      |  results section.    
|
+----------------------+--------+-------------+----------------------+----------------------
 
+
| EXTERNAL LAB:        | Routin | 2019  |                      |   Results for this   
|
| HEMOGLOBIN A1C       | e      |             |                      | procedure are in the 
|
|                      |        |             |                      |  results section.    
|
+----------------------+--------+-------------+----------------------+----------------------
+
| TACROLIMUS TROUGH    | Routin | 2019  |                      |   Results for this   
|
| LC-MS/MS             | e      |             |                      | procedure are in the 
|
|                      |        |             |                      |  results section.    
|
+----------------------+--------+-------------+----------------------+----------------------
+
| HEMOGLOBIN A1C       | Routin | 2019  |                      |   Results for this   
|
|                      | e      |             |                      | procedure are in the 
|
|                      |        |             |                      |  results section.    
|
+----------------------+--------+-------------+----------------------+----------------------
+
| EXTERNAL LAB: BUN    | Routin | 2019  |                      |   Results for this   
|
|                      | e      |             |                      | procedure are in the 
|
|                      |        |             |                      |  results section.    
|
+----------------------+--------+-------------+----------------------+----------------------
+
| EXTERNAL LAB:        | Routin | 2019  |                      |   Results for this   
|
| GLUCOSE              | e      |             |                      | procedure are in the 
|
|                      |        |             |                      |  results section.    
|
+----------------------+--------+-------------+----------------------+----------------------
+
| EXTERNAL LAB: ALT    | Routin | 2019  |                      |   Results for this   
|
|                      | e      |             |                      | procedure are in the 
|
|                      |        |             |                      |  results section.    
|
+----------------------+--------+-------------+----------------------+----------------------
+
| EXTERNAL LAB: AST    | Routin | 2019  |                      |   Results for this   
|
|                      | e      |             |                      | procedure are in the 
|
|                      |        |             |                      |  results section.    
|
+----------------------+--------+-------------+----------------------+----------------------
+
| EXTERNAL LAB:        | Routin | 2019  |                      |   Results for this   
|
| ALKALINE PHOSPHATASE | e      |             |                      | procedure are in the 
 
|
|                      |        |             |                      |  results section.    
|
+----------------------+--------+-------------+----------------------+----------------------
+
| EXTERNAL LAB:        | Routin | 2019  |                      |   Results for this   
|
| BILIRUBIN, TOTAL     | e      |             |                      | procedure are in the 
|
|                      |        |             |                      |  results section.    
|
+----------------------+--------+-------------+----------------------+----------------------
+
| EXTERNAL LAB:        | Routin | 2019  |                      |   Results for this   
|
| ALBUMIN              | e      |             |                      | procedure are in the 
|
|                      |        |             |                      |  results section.    
|
+----------------------+--------+-------------+----------------------+----------------------
+
| EXTERNAL LAB:        | Routin | 2019  |                      |   Results for this   
|
| PROTEIN, TOTAL       | e      |             |                      | procedure are in the 
|
|                      |        |             |                      |  results section.    
|
+----------------------+--------+-------------+----------------------+----------------------
+
| EXTERNAL LAB:        | Routin | 2019  |                      |   Results for this   
|
| PHOSPHORUS           | e      |             |                      | procedure are in the 
|
|                      |        |             |                      |  results section.    
|
+----------------------+--------+-------------+----------------------+----------------------
+
| EXTERNAL LAB:        | Routin | 2019  |                      |   Results for this   
|
| MAGNESIUM            | e      |             |                      | procedure are in the 
|
|                      |        |             |                      |  results section.    
|
+----------------------+--------+-------------+----------------------+----------------------
+
| EXTERNAL LAB:        | Routin | 2019  |                      |   Results for this   
|
| CALCIUM              | e      |             |                      | procedure are in the 
|
|                      |        |             |                      |  results section.    
|
+----------------------+--------+-------------+----------------------+----------------------
+
| EXTERNAL LAB: CARBON | Routin | 2019  |                      |   Results for this   
|
|  DIOXIDE             | e      |             |                      | procedure are in the 
|
|                      |        |             |                      |  results section.    
|
+----------------------+--------+-------------+----------------------+----------------------
 
+
| EXTERNAL LAB:        | Routin | 2019  |                      |   Results for this   
|
| CHLORIDE             | e      |             |                      | procedure are in the 
|
|                      |        |             |                      |  results section.    
|
+----------------------+--------+-------------+----------------------+----------------------
+
| EXTERNAL LAB:        | Routin | 2019  |                      |   Results for this   
|
| POTASSIUM            | e      |             |                      | procedure are in the 
|
|                      |        |             |                      |  results section.    
|
+----------------------+--------+-------------+----------------------+----------------------
+
| EXTERNAL LAB: SODIUM | Routin | 2019  |                      |   Results for this   
|
|                      | e      |             |                      | procedure are in the 
|
|                      |        |             |                      |  results section.    
|
+----------------------+--------+-------------+----------------------+----------------------
+
| EXTERNAL LAB: CBC    | Routin | 2019  |                      |   Results for this   
|
|                      | e      |             |                      | procedure are in the 
|
|                      |        |             |                      |  results section.    
|
+----------------------+--------+-------------+----------------------+----------------------
+
| EXTERNAL LAB: TSH    | Routin | 2019  |                      |   Results for this   
|
|                      | e      |             |                      | procedure are in the 
|
|                      |        |             |                      |  results section.    
|
+----------------------+--------+-------------+----------------------+----------------------
+
| EXTERNAL LAB:        | Routin | 2019  |                      |   Results for this   
|
| TRIGLYCERIDES        | e      |             |                      | procedure are in the 
|
|                      |        |             |                      |  results section.    
|
+----------------------+--------+-------------+----------------------+----------------------
+
| EXTERNAL LAB:        | Routin | 2019  |                      |   Results for this   
|
| CHOLESTEROL, HDL     | e      |             |                      | procedure are in the 
|
|                      |        |             |                      |  results section.    
|
+----------------------+--------+-------------+----------------------+----------------------
+
| EXTERNAL LAB:        | Routin | 2019  |                      |   Results for this   
|
| CHOLESTEROL, TOTAL   | e      |             |                      | procedure are in the 
 
|
|                      |        |             |                      |  results section.    
|
+----------------------+--------+-------------+----------------------+----------------------
+
| EXTERNAL LAB:        | Routin | 2019  |                      |   Results for this   
|
| CHOLESTEROL, LDL     | e      |             |                      | procedure are in the 
|
|                      |        |             |                      |  results section.    
|
+----------------------+--------+-------------+----------------------+----------------------
+
| EXTERNAL LAB: EGFR   | Routin | 2019  |                      |   Results for this   
|
|                      | e      |             |                      | procedure are in the 
|
|                      |        |             |                      |  results section.    
|
+----------------------+--------+-------------+----------------------+----------------------
+
| EXTERNAL LAB:        | Routin | 2019  |                      |   Results for this   
|
| CREATININE           | e      |             |                      | procedure are in the 
|
|                      |        |             |                      |  results section.    
|
+----------------------+--------+-------------+----------------------+----------------------
+
| ECG 12 LEAD          | Routin | 2019  |   Persistent atrial  |   Results for this   
|
|                      | e      | 12:02 PM    | fibrillation  NSVT   | procedure are in the 
|
|                      |        | PST         | (nonsustained        |  results section.    
|
|                      |        |             | ventricular          |                      
|
|                      |        |             | tachycardia) (HCC)   |                      
|
|                      |        |             | and ventricular      |                      
|
|                      |        |             | ectopy               |                      
|
+----------------------+--------+-------------+----------------------+----------------------
+
| MOBILE CARDIAC       | Routin | 2019  |   Coronary artery    |   Results for this   
|
| TELEMETRY (MCT)      | e      |  1:58 PM    | disease involving    | procedure are in the 
|
|                      |        | PST         | native coronary      |  results section.    
|
|                      |        |             | artery of native     |                      
|
|                      |        |             | heart without angina |                      
|
|                      |        |             |  pectoris            |                      
|
|                      |        |             | Hypertension,        |                      
|
|                      |        |             | essential  Mixed     |                      
 
|
|                      |        |             | hyperlipidemia  PVC  |                      
|
|                      |        |             | (premature           |                      
|
|                      |        |             | ventricular          |                      
|
|                      |        |             | contraction)         |                      
|
+----------------------+--------+-------------+----------------------+----------------------
+
| ECHO COMPLETE        | Routin | 10/30/2019  |   Coronary artery    |   Results for this   
|
|                      | e      |  5:07 PM    | disease involving    | procedure are in the 
|
|                      |        | PDT         | native coronary      |  results section.    
|
|                      |        |             | artery of native     |                      
|
|                      |        |             | heart without angina |                      
|
|                      |        |             |  pectoris            |                      
|
|                      |        |             | Hypertension,        |                      
|
|                      |        |             | essential  Mixed     |                      
|
|                      |        |             | hyperlipidemia  PVC  |                      
|
|                      |        |             | (premature           |                      
|
|                      |        |             | ventricular          |                      
|
|                      |        |             | contraction)         |                      
|
+----------------------+--------+-------------+----------------------+----------------------
+
| NM NUCLEAR STRESS    | Routin | 10/30/2019  |   Coronary artery    |   Results for this   
|
| TEST (PHARMACOLOGIC  | e      | 12:56 PM    | disease involving    | procedure are in the 
|
| - VASODILATOR)       |        | PDT         | native coronary      |  results section.    
|
|                      |        |             | artery of native     |                      
|
|                      |        |             | heart without angina |                      
|
|                      |        |             |  pectoris            |                      
|
|                      |        |             | Hypertension,        |                      
|
|                      |        |             | essential  Mixed     |                      
|
|                      |        |             | hyperlipidemia       |                      
|
+----------------------+--------+-------------+----------------------+----------------------
+
| ECG 12 LEAD          | Routin | 10/10/2019  |   Hypertension,      |   Results for this   
|
|                      | e      |  1:55 PM    | essential            | procedure are in the 
 
|
|                      |        | PDT         |                      |  results section.    
|
+----------------------+--------+-------------+----------------------+----------------------
+
 from Last 3 Months
 
 Results
 External Lab: ALMA (2019)
 
+-----------+--------+-----------+------------+--------------+
| Component | Value  | Ref Range | Performed  | Pathologist  |
|           |        |           | At         | Signature    |
+-----------+--------+-----------+------------+--------------+
| ALMA,      | 33 (A) | 6 - 23    | EXTERNAL   |              |
| External  |        |           | LAB        |              |
+-----------+--------+-----------+------------+--------------+
 
 
 
+----------------+---------+--------------------+--------------+
| Performing     | Address | City/State/Zipcode | Phone Number |
| Organization   |         |                    |              |
+----------------+---------+--------------------+--------------+
|   EXTERNAL LAB |         |                    |              |
+----------------+---------+--------------------+--------------+
 External Lab: Glucose (2019)
 
+-----------+-------+-----------+------------+--------------+
| Component | Value | Ref Range | Performed  | Pathologist  |
|           |       |           | At         | Signature    |
+-----------+-------+-----------+------------+--------------+
| Glucose,  | 94    | 70 - 100  | EXTERNAL   |              |
| External  |       |           | LAB        |              |
+-----------+-------+-----------+------------+--------------+
 
 
 
+----------------+---------+--------------------+--------------+
| Performing     | Address | City/State/Zipcode | Phone Number |
| Organization   |         |                    |              |
+----------------+---------+--------------------+--------------+
|   EXTERNAL LAB |         |                    |              |
+----------------+---------+--------------------+--------------+
 External Lab: ALT (2019)
 
+-----------+-------+-----------+------------+--------------+
| Component | Value | Ref Range | Performed  | Pathologist  |
|           |       |           | At         | Signature    |
+-----------+-------+-----------+------------+--------------+
| ALT,      | 18    | 7 - 52    | EXTERNAL   |              |
| External  |       |           | LAB        |              |
+-----------+-------+-----------+------------+--------------+
 
 
 
+----------------+---------+--------------------+--------------+
| Performing     | Address | City/State/Zipcode | Phone Number |
| Organization   |         |                    |              |
+----------------+---------+--------------------+--------------+
 
|   EXTERNAL LAB |         |                    |              |
+----------------+---------+--------------------+--------------+
 External Lab: AST (2019)
 
+-----------+-------+-----------+------------+--------------+
| Component | Value | Ref Range | Performed  | Pathologist  |
|           |       |           | At         | Signature    |
+-----------+-------+-----------+------------+--------------+
| AST,      | 32    | 13 - 39   | EXTERNAL   |              |
| External  |       |           | LAB        |              |
+-----------+-------+-----------+------------+--------------+
 
 
 
+----------------+---------+--------------------+--------------+
| Performing     | Address | City/State/Zipcode | Phone Number |
| Organization   |         |                    |              |
+----------------+---------+--------------------+--------------+
|   EXTERNAL LAB |         |                    |              |
+----------------+---------+--------------------+--------------+
 External Lab: Alkaline Phosphatase (2019)
 
+-----------+-------+-----------+------------+--------------+
| Component | Value | Ref Range | Performed  | Pathologist  |
|           |       |           | At         | Signature    |
+-----------+-------+-----------+------------+--------------+
| ALP,      | 72    | 31 - 130  | EXTERNAL   |              |
| External  |       |           | LAB        |              |
+-----------+-------+-----------+------------+--------------+
 
 
 
+----------------+---------+--------------------+--------------+
| Performing     | Address | City/State/Zipcode | Phone Number |
| Organization   |         |                    |              |
+----------------+---------+--------------------+--------------+
|   EXTERNAL LAB |         |                    |              |
+----------------+---------+--------------------+--------------+
 External Lab: Bilirubin, Total (2019)
 
+-------------+-------+-----------+------------+--------------+
| Component   | Value | Ref Range | Performed  | Pathologist  |
|             |       |           | At         | Signature    |
+-------------+-------+-----------+------------+--------------+
| Bilirubin,  | 0.7   | 0 - 1.2   | EXTERNAL   |              |
| Total,      |       |           | LAB        |              |
| External    |       |           |            |              |
+-------------+-------+-----------+------------+--------------+
 
 
 
+----------------+---------+--------------------+--------------+
| Performing     | Address | City/State/Zipcode | Phone Number |
| Organization   |         |                    |              |
+----------------+---------+--------------------+--------------+
|   EXTERNAL LAB |         |                    |              |
+----------------+---------+--------------------+--------------+
 External Lab: Albumin (2019)
 
+-----------+-------+-----------+------------+--------------+
 
| Component | Value | Ref Range | Performed  | Pathologist  |
|           |       |           | At         | Signature    |
+-----------+-------+-----------+------------+--------------+
| Albumin,  | 3.6   | 3.5 - 5   | EXTERNAL   |              |
| External  |       |           | LAB        |              |
+-----------+-------+-----------+------------+--------------+
 
 
 
+----------------+---------+--------------------+--------------+
| Performing     | Address | City/State/Zipcode | Phone Number |
| Organization   |         |                    |              |
+----------------+---------+--------------------+--------------+
|   EXTERNAL LAB |         |                    |              |
+----------------+---------+--------------------+--------------+
 External Lab: Protein, Total (2019)
 
+-----------+---------+-----------+------------+--------------+
| Component | Value   | Ref Range | Performed  | Pathologist  |
|           |         |           | At         | Signature    |
+-----------+---------+-----------+------------+--------------+
| Protein,  | 5.7 (A) | 6 - 8.3   | EXTERNAL   |              |
| Total,    |         |           | LAB        |              |
| External  |         |           |            |              |
+-----------+---------+-----------+------------+--------------+
 
 
 
+----------------+---------+--------------------+--------------+
| Performing     | Address | City/State/Zipcode | Phone Number |
| Organization   |         |                    |              |
+----------------+---------+--------------------+--------------+
|   EXTERNAL LAB |         |                    |              |
+----------------+---------+--------------------+--------------+
 External Lab: Phosphorus (2019)
 
+-------------+-------+-----------+------------+--------------+
| Component   | Value | Ref Range | Performed  | Pathologist  |
|             |       |           | At         | Signature    |
+-------------+-------+-----------+------------+--------------+
| Phosphorus, | 2.9   | 2.5 - 5   | EXTERNAL   |              |
|  External   |       |           | LAB        |              |
+-------------+-------+-----------+------------+--------------+
 
 
 
+----------------+---------+--------------------+--------------+
| Performing     | Address | City/State/Zipcode | Phone Number |
| Organization   |         |                    |              |
+----------------+---------+--------------------+--------------+
|   EXTERNAL LAB |         |                    |              |
+----------------+---------+--------------------+--------------+
 External Lab: Magnesium (2019)
 
+-------------+-------+-----------+------------+--------------+
| Component   | Value | Ref Range | Performed  | Pathologist  |
|             |       |           | At         | Signature    |
+-------------+-------+-----------+------------+--------------+
| Magnesium,  | 2     | 1.7 - 2.5 | EXTERNAL   |              |
| External    |       |           | LAB        |              |
 
+-------------+-------+-----------+------------+--------------+
 
 
 
+----------------+---------+--------------------+--------------+
| Performing     | Address | City/State/Zipcode | Phone Number |
| Organization   |         |                    |              |
+----------------+---------+--------------------+--------------+
|   EXTERNAL LAB |         |                    |              |
+----------------+---------+--------------------+--------------+
 External Lab: Calcium (2019)
 
+-----------+----------+------------+------------+--------------+
| Component | Value    | Ref Range  | Performed  | Pathologist  |
|           |          |            | At         | Signature    |
+-----------+----------+------------+------------+--------------+
| Calcium,  | 10.4 (A) | 8.5 - 10.3 | EXTERNAL   |              |
| External  |          |            | LAB        |              |
+-----------+----------+------------+------------+--------------+
 
 
 
+----------------+---------+--------------------+--------------+
| Performing     | Address | City/State/Zipcode | Phone Number |
| Organization   |         |                    |              |
+----------------+---------+--------------------+--------------+
|   EXTERNAL LAB |         |                    |              |
+----------------+---------+--------------------+--------------+
 External Lab: Carbon Dioxide (2019)
 
+-----------+-------+-----------+------------+--------------+
| Component | Value | Ref Range | Performed  | Pathologist  |
|           |       |           | At         | Signature    |
+-----------+-------+-----------+------------+--------------+
| Carbon    | 21    | 19 - 31   | EXTERNAL   |              |
| Dioxide,  |       |           | LAB        |              |
| External  |       |           |            |              |
+-----------+-------+-----------+------------+--------------+
 
 
 
+----------------+---------+--------------------+--------------+
| Performing     | Address | City/State/Zipcode | Phone Number |
| Organization   |         |                    |              |
+----------------+---------+--------------------+--------------+
|   EXTERNAL LAB |         |                    |              |
+----------------+---------+--------------------+--------------+
 External Lab: Chloride (2019)
 
+------------+-------+-----------+------------+--------------+
| Component  | Value | Ref Range | Performed  | Pathologist  |
|            |       |           | At         | Signature    |
+------------+-------+-----------+------------+--------------+
| Chloride,  | 111   | 95 - 112  | EXTERNAL   |              |
| External   |       |           | LAB        |              |
+------------+-------+-----------+------------+--------------+
 
 
 
+----------------+---------+--------------------+--------------+
 
| Performing     | Address | City/State/Zipcode | Phone Number |
| Organization   |         |                    |              |
+----------------+---------+--------------------+--------------+
|   EXTERNAL LAB |         |                    |              |
+----------------+---------+--------------------+--------------+
 External Lab: Potassium (2019)
 
+-------------+-------+-----------+------------+--------------+
| Component   | Value | Ref Range | Performed  | Pathologist  |
|             |       |           | At         | Signature    |
+-------------+-------+-----------+------------+--------------+
| Potassium,  | 4.7   | 3.6 - 5.1 | EXTERNAL   |              |
| External    |       |           | LAB        |              |
+-------------+-------+-----------+------------+--------------+
 
 
 
+----------------+---------+--------------------+--------------+
| Performing     | Address | City/State/Zipcode | Phone Number |
| Organization   |         |                    |              |
+----------------+---------+--------------------+--------------+
|   EXTERNAL LAB |         |                    |              |
+----------------+---------+--------------------+--------------+
 External Lab: Sodium (2019)
 
+-----------+-------+-----------+------------+--------------+
| Component | Value | Ref Range | Performed  | Pathologist  |
|           |       |           | At         | Signature    |
+-----------+-------+-----------+------------+--------------+
| Sodium,   | 143   | 132 - 143 | EXTERNAL   |              |
| External  |       |           | LAB        |              |
+-----------+-------+-----------+------------+--------------+
 
 
 
+----------------+---------+--------------------+--------------+
| Performing     | Address | City/State/Zipcode | Phone Number |
| Organization   |         |                    |              |
+----------------+---------+--------------------+--------------+
|   EXTERNAL LAB |         |                    |              |
+----------------+---------+--------------------+--------------+
 External Lab: CBC (2019)
 
+-----------+----------+-----------+------------+--------------+
| Component | Value    | Ref Range | Performed  | Pathologist  |
|           |          |           | At         | Signature    |
+-----------+----------+-----------+------------+--------------+
| WBC,      | 4.8      | 4.5 - 11  | EXTERNAL   |              |
| External  |          |           | LAB        |              |
+-----------+----------+-----------+------------+--------------+
| HGB,      | 13       | 12 - 16   | EXTERNAL   |              |
| External  |          |           | LAB        |              |
+-----------+----------+-----------+------------+--------------+
| HCT,      | 40.3     | 35 - 45   | EXTERNAL   |              |
| External  |          |           | LAB        |              |
+-----------+----------+-----------+------------+--------------+
| PLT,      | 177      | 140 - 440 | EXTERNAL   |              |
| External  |          |           | LAB        |              |
+-----------+----------+-----------+------------+--------------+
| RBC,      | 3.75 (A) | 3.8 - 5.1 | EXTERNAL   |              |
 
| External  |          |           | LAB        |              |
+-----------+----------+-----------+------------+--------------+
| MCV,      | 107 (A)  | 81 - 99   | EXTERNAL   |              |
| External  |          |           | LAB        |              |
+-----------+----------+-----------+------------+--------------+
| RDW,      | 14.4     | 10.5 - 15 | EXTERNAL   |              |
| External  |          |           | LAB        |              |
+-----------+----------+-----------+------------+--------------+
 
 
 
+----------------+---------+--------------------+--------------+
| Performing     | Address | City/State/Zipcode | Phone Number |
| Organization   |         |                    |              |
+----------------+---------+--------------------+--------------+
|   EXTERNAL LAB |         |                    |              |
+----------------+---------+--------------------+--------------+
 Tacrolimus Trough, LC-MS/MS (2019)
 
+-------------+-------+-----------+------------+--------------+
| Component   | Value | Ref Range | Performed  | Pathologist  |
|             |       |           | At         | Signature    |
+-------------+-------+-----------+------------+--------------+
| Tacrolimus, | 5.5   |           |            |              |
|  LC-MS/MS,  |       |           |            |              |
| External    |       |           |            |              |
+-------------+-------+-----------+------------+--------------+
 
 
 
+----------+
| Specimen |
+----------+
| Blood    |
+----------+
 External Lab: TSH (2019)
 
+-----------+-------+------------+------------+--------------+
| Component | Value | Ref Range  | Performed  | Pathologist  |
|           |       |            | At         | Signature    |
+-----------+-------+------------+------------+--------------+
| TSH,      | 1.69  | 0.27 - 4.2 | EXTERNAL   |              |
| External  |       |            | LAB        |              |
+-----------+-------+------------+------------+--------------+
 
 
 
+----------+
| Specimen |
+----------+
| Blood    |
+----------+
 
 
 
+----------------+---------+--------------------+--------------+
| Performing     | Address | City/State/Zipcode | Phone Number |
| Organization   |         |                    |              |
+----------------+---------+--------------------+--------------+
|   EXTERNAL LAB |         |                    |              |
 
+----------------+---------+--------------------+--------------+
 External Lab: Triglycerides (2019)
 
+-------------+-------+-----------+------------+--------------+
| Component   | Value | Ref Range | Performed  | Pathologist  |
|             |       |           | At         | Signature    |
+-------------+-------+-----------+------------+--------------+
| Triglycerid | 122   | 30 - 150  | EXTERNAL   |              |
| es,         |       |           | LAB        |              |
| External    |       |           |            |              |
+-------------+-------+-----------+------------+--------------+
 
 
 
+----------+
| Specimen |
+----------+
| Blood    |
+----------+
 
 
 
+----------------+---------+--------------------+--------------+
| Performing     | Address | City/State/Zipcode | Phone Number |
| Organization   |         |                    |              |
+----------------+---------+--------------------+--------------+
|   EXTERNAL LAB |         |                    |              |
+----------------+---------+--------------------+--------------+
 External Lab: Cholesterol, HDL (2019)
 
+-------------+-------+-----------+------------+--------------+
| Component   | Value | Ref Range | Performed  | Pathologist  |
|             |       |           | At         | Signature    |
+-------------+-------+-----------+------------+--------------+
| HDL         | 50.4  | 40 mg/dl  | EXTERNAL   |              |
| Cholesterol |       |           | LAB        |              |
| , External  |       |           |            |              |
+-------------+-------+-----------+------------+--------------+
 
 
 
+----------+
| Specimen |
+----------+
| Blood    |
+----------+
 
 
 
+----------------+---------+--------------------+--------------+
| Performing     | Address | City/State/Zipcode | Phone Number |
| Organization   |         |                    |              |
+----------------+---------+--------------------+--------------+
|   EXTERNAL LAB |         |                    |              |
+----------------+---------+--------------------+--------------+
 External Lab: Cholesterol, Total (2019)
 
+-------------+-------+-----------+------------+--------------+
| Component   | Value | Ref Range | Performed  | Pathologist  |
|             |       |           | At         | Signature    |
 
+-------------+-------+-----------+------------+--------------+
| Cholesterol | 113   | 200 mg/dl | EXTERNAL   |              |
| , Total,    |       |           | LAB        |              |
| External    |       |           |            |              |
+-------------+-------+-----------+------------+--------------+
 
 
 
+----------+
| Specimen |
+----------+
| Blood    |
+----------+
 
 
 
+----------------+---------+--------------------+--------------+
| Performing     | Address | City/State/Zipcode | Phone Number |
| Organization   |         |                    |              |
+----------------+---------+--------------------+--------------+
|   EXTERNAL LAB |         |                    |              |
+----------------+---------+--------------------+--------------+
 External Lab: Cholesterol, LDL (2019)
 
+-------------+-------+-----------+------------+--------------+
| Component   | Value | Ref Range | Performed  | Pathologist  |
|             |       |           | At         | Signature    |
+-------------+-------+-----------+------------+--------------+
| LDL         | 38    | 100       | EXTERNAL   |              |
| Cholesterol |       |           | LAB        |              |
| , Direct,   |       |           |            |              |
| External    |       |           |            |              |
+-------------+-------+-----------+------------+--------------+
 
 
 
+----------+
| Specimen |
+----------+
| Blood    |
+----------+
 
 
 
+----------------+---------+--------------------+--------------+
| Performing     | Address | City/State/Zipcode | Phone Number |
| Organization   |         |                    |              |
+----------------+---------+--------------------+--------------+
|   EXTERNAL LAB |         |                    |              |
+----------------+---------+--------------------+--------------+
 External Lab: eGFR (2019)
 
+-----------+-------+-----------+------------+--------------+
| Component | Value | Ref Range | Performed  | Pathologist  |
|           |       |           | At         | Signature    |
+-----------+-------+-----------+------------+--------------+
| eGFR,     | 41    |           | EXTERNAL   |              |
| External  |       |           | LAB        |              |
+-----------+-------+-----------+------------+--------------+
 
 
 
 
+----------+
| Specimen |
+----------+
| Blood    |
+----------+
 
 
 
+----------------+---------+--------------------+--------------+
| Performing     | Address | City/State/Zipcode | Phone Number |
| Organization   |         |                    |              |
+----------------+---------+--------------------+--------------+
|   EXTERNAL LAB |         |                    |              |
+----------------+---------+--------------------+--------------+
 External Lab: Creatinine (2019)
 
+-------------+----------+------------+------------+--------------+
| Component   | Value    | Ref Range  | Performed  | Pathologist  |
|             |          |            | At         | Signature    |
+-------------+----------+------------+------------+--------------+
| Creatinine, | 1.29 (A) | 0.7 - 1.18 | EXTERNAL   |              |
|  External   |          |            | LAB        |              |
+-------------+----------+------------+------------+--------------+
 
 
 
+----------+
| Specimen |
+----------+
| Blood    |
+----------+
 
 
 
+----------------+---------+--------------------+--------------+
| Performing     | Address | City/State/Zipcode | Phone Number |
| Organization   |         |                    |              |
+----------------+---------+--------------------+--------------+
|   EXTERNAL LAB |         |                    |              |
+----------------+---------+--------------------+--------------+
 External Lab: Hemoglobin A1c (2019)
 
+-------------+-------+-----------+------------+--------------+
| Component   | Value | Ref Range | Performed  | Pathologist  |
|             |       |           | At         | Signature    |
+-------------+-------+-----------+------------+--------------+
| Hemoglobin  | 6.4   | %         |            |              |
| A1c,        |       |           |            |              |
| external    |       |           |            |              |
+-------------+-------+-----------+------------+--------------+
 
 
 
+----------+
| Specimen |
+----------+
| Blood    |
+----------+
 
 LABS - EXTERNAL SCAN (2019 12:00 AM PST)
 
+------------------------------------------------------------------------+--------------+
| Narrative                                                              | Performed At |
+------------------------------------------------------------------------+--------------+
|   This result has an attachment that is not available.  Ordered by an  |              |
| unspecified provider.                                                  |              |
+------------------------------------------------------------------------+--------------+
 Hemoglobin A1C (2019)
 
+-------------+-------+-----------+------------+--------------+
| Component   | Value | Ref Range | Performed  | Pathologist  |
|             |       |           | At         | Signature    |
+-------------+-------+-----------+------------+--------------+
| Hemoglobin  | 6.4   | %         | EXTERNAL   |              |
| A1c         |       |           | LAB        |              |
+-------------+-------+-----------+------------+--------------+
 
 
 
+----------+
| Specimen |
+----------+
| Blood    |
+----------+
 
 
 
+----------------+---------+--------------------+--------------+
| Performing     | Address | City/State/Zipcode | Phone Number |
| Organization   |         |                    |              |
+----------------+---------+--------------------+--------------+
|   EXTERNAL LAB |         |                    |              |
+----------------+---------+--------------------+--------------+
 ECG 12 lead (2019 12:02 PM PST)Only the most recent of 2 results within the time [place]
od is included.
 
+-------------+--------------------------+-----------+------------+--------------+
| Component   | Value                    | Ref Range | Performed  | Pathologist  |
|             |                          |           | At         | Signature    |
+-------------+--------------------------+-----------+------------+--------------+
| VENTRICULAR | 78                       | BPM       | [facility]  |              |
|  RATE EKG   |                          |           |            |              |
+-------------+--------------------------+-----------+------------+--------------+
| ATRIAL RATE | 113                      | BPM       | [facility]  |              |
+-------------+--------------------------+-----------+------------+--------------+
| QRS         | 102                      | ms        | [facility]  |              |
| DURATION    |                          |           |            |              |
+-------------+--------------------------+-----------+------------+--------------+
| Q-T         | 374                      | ms        | [facility]  |              |
| INTERVAL    |                          |           |            |              |
+-------------+--------------------------+-----------+------------+--------------+
| Q-T         | 426                      | ms        | [facility]  |              |
| INTERVAL    |                          |           |            |              |
| (CORRECTED) |                          |           |            |              |
+-------------+--------------------------+-----------+------------+--------------+
| QRS AXIS    | -26                      | degrees   | [facility]  |              |
+-------------+--------------------------+-----------+------------+--------------+
| T AXIS      | 64                       | degrees   | [facility]  |              |
+-------------+--------------------------+-----------+------------+--------------+
 
| INTERPRETAT | Atrial fibrillation with |           | [facility]  |              |
| ION TEXT    |  premature ventricular   |           |            |              |
|             | or aberrantly conducted  |           |            |              |
|             | complexesAnterior        |           |            |              |
|             | infarct (cited on or     |           |            |              |
|             | before                   |           |            |              |
|             | 10-OCT-2019)Abnormal     |           |            |              |
|             | ECGWhen compared with    |           |            |              |
|             | ECG of 10-OCT-2019       |           |            |              |
|             | 13:55,Previous ECG has   |           |            |              |
|             | undetermined rhythm,     |           |            |              |
|             | needs reviewNonspecific  |           |            |              |
|             | T wave abnormality no    |           |            |              |
|             | longer evident in        |           |            |              |
|             | Inferior leadsConfirmed  |           |            |              |
|             | by RYLAN ONEILL MD     |           |            |              |
|             | ([id number]) on 2019    |           |            |              |
|             | 5:55:27 PM               |           |            |              |
+-------------+--------------------------+-----------+------------+--------------+
 
 
 
+----------+
| Specimen |
+----------+
|          |
+----------+
 
 
 
+----------------------------------------------------------+--------------+
| Narrative                                                | Performed At |
+----------------------------------------------------------+--------------+
|   This result has an attachment that is not available.   |              |
+----------------------------------------------------------+--------------+
 
 
 
+--------------+---------+--------------------+--------------+
| Performing   | Address | City/State/Zipcode | Phone Number |
| Organization |         |                    |              |
+--------------+---------+--------------------+--------------+
|   [facility]  |         |                    |              |
+--------------+---------+--------------------+--------------+
 Mobile Cardiac Telemetry (2019  1:58 PM PST)
 
+------------------------------------------------------------------------+--------------+
| Narrative                                                              | Performed At |
+------------------------------------------------------------------------+--------------+
|   Popeye Townsend MD       2019 14:04  Mobile cardiac telemetry |   [facility]  |
|  from 10/16 until 10/30/2019.  Baseline EKG show atrial fibrillation   |              |
| with heart rate ranging   from 33 to 149 bpm.  PVCs, ventricular       |              |
| couplets, 4 beat run nonsustained ventricular   tachycardia was noted. |              |
|      Episodes of rapid ventricular response with heart rate of149 bpm  |              |
|   was noted.  Pauses up to 3.8-second were present.  Findings suggest  |              |
| potential tacky/bradycardia syndrome.                                  |              |
+------------------------------------------------------------------------+--------------+
 
 
 
 
+--------------+---------+--------------------+--------------+
| Performing   | Address | City/State/Zipcode | Phone Number |
| Organization |         |                    |              |
+--------------+---------+--------------------+--------------+
|   [facility]  |         |                    |              |
+--------------+---------+--------------------+--------------+
 ECHO Complete (10/30/2019  5:07 PM PDT)
 
+-------------+---------+-----------+-------------+--------------+
| Component   | Value   | Ref Range | Performed   | Pathologist  |
|             |         |           | At          | Signature    |
+-------------+---------+-----------+-------------+--------------+
| Patient     | 262 lbs |           | [facility] |              |
| Weight      |         |           |             |              |
| (lbs)       |         |           |             |              |
+-------------+---------+-----------+-------------+--------------+
| Patient     | 5'6     |           | [facility] |              |
| Height      |         |           |             |              |
+-------------+---------+-----------+-------------+--------------+
| LVIDd       | 4.13    | cm        | [facility] |              |
+-------------+---------+-----------+-------------+--------------+
| FS          | 28      | %         | [facility] |              |
+-------------+---------+-----------+-------------+--------------+
| LA volume   | 94.38   | mL        | [facility] |              |
+-------------+---------+-----------+-------------+--------------+
| Ascending   | 3.33    | cm        | [facility] |              |
| aorta       |         |           |             |              |
+-------------+---------+-----------+-------------+--------------+
| Aortic arch | 2.24    | cm        | [facility] |              |
+-------------+---------+-----------+-------------+--------------+
| IVRT        | 107.27  | msec      | [facility] |              |
+-------------+---------+-----------+-------------+--------------+
| LVOT peak   | 77.14   | cm/s      | [facility] |              |
| travon         |         |           |             |              |
+-------------+---------+-----------+-------------+--------------+
| AV peak travon | 139.85  | cm/s      | [facility] |              |
+-------------+---------+-----------+-------------+--------------+
| AV peak     | 7.82    | mmHg      | [facility] |              |
| gradient    |         |           |             |              |
+-------------+---------+-----------+-------------+--------------+
| LA Volume   | 42      | mL/m2     | [facility] |              |
| Index       |         |           |             |              |
+-------------+---------+-----------+-------------+--------------+
| AV LVOT     | 2.38    | mmHg      | [facility] |              |
| Peak        |         |           |             |              |
| Gradient    |         |           |             |              |
+-------------+---------+-----------+-------------+--------------+
| TR Peak     | 18      | mmHg      | [facility] |              |
| Gradient    |         |           |             |              |
+-------------+---------+-----------+-------------+--------------+
| TR Velocity | 214.5   | cm        | [facility] |              |
+-------------+---------+-----------+-------------+--------------+
| LV          | 7.19    | cm        | [facility] |              |
| Diastolic   |         |           |             |              |
| Length 4C   |         |           |             |              |
+-------------+---------+-----------+-------------+--------------+
| LV          | 50      | %         | [facility] |              |
| Moran's   |         |           |             |              |
| Biplane EF  |         |           |             |              |
+-------------+---------+-----------+-------------+--------------+
 
| LV ED       | 45.92   | ml        | [facility] |              |
| Volume      |         |           |             |              |
| (Moran's) |         |           |             |              |
+-------------+---------+-----------+-------------+--------------+
| LV ED       | 20      | ml/m2     | [facility] |              |
| Volume      |         |           |             |              |
| Index       |         |           |             |              |
+-------------+---------+-----------+-------------+--------------+
| LV ES       | 23.42   | ml        | [facility] |              |
| Volume      |         |           |             |              |
+-------------+---------+-----------+-------------+--------------+
| MV          | 197.48  | msec      | [facility] |              |
| Deceleratio |         |           |             |              |
| n Time      |         |           |             |              |
+-------------+---------+-----------+-------------+--------------+
| MV Peak     | 81.53   | cm/s      | [facility] |              |
| E-Wave      |         |           |             |              |
+-------------+---------+-----------+-------------+--------------+
| LA/Aorta    | 1.19    |           | [facility] |              |
| Ratio       |         |           |             |              |
+-------------+---------+-----------+-------------+--------------+
| LA Area     | 27.42   | cm2       | [facility] |              |
+-------------+---------+-----------+-------------+--------------+
| LV ES       | 10      | ml/m2     | [facility] |              |
| Volume      |         |           |             |              |
| Index       |         |           |             |              |
+-------------+---------+-----------+-------------+--------------+
| Aortic Root | 3.7     | cm        | [facility] |              |
|  Diameter   |         |           |             |              |
+-------------+---------+-----------+-------------+--------------+
| IVS         | 1.15    | cm        | [facility] |              |
| Diastolic   |         |           |             |              |
| Thickness   |         |           |             |              |
| MM          |         |           |             |              |
+-------------+---------+-----------+-------------+--------------+
| LVPW        | 1.21    | cm        | [facility] |              |
| Diastolic   |         |           |             |              |
| Thickness   |         |           |             |              |
| MM          |         |           |             |              |
+-------------+---------+-----------+-------------+--------------+
| IVS         | 1.29    | cm        | [facility] |              |
| Systolic    |         |           |             |              |
| Thickness   |         |           |             |              |
| MM          |         |           |             |              |
+-------------+---------+-----------+-------------+--------------+
| LV Systolic | 2.97    | cm        | [facility] |              |
|  Diameter   |         |           |             |              |
| MM          |         |           |             |              |
+-------------+---------+-----------+-------------+--------------+
| LVPW        | 1.65    | cm        | [facility] |              |
| Systolic    |         |           |             |              |
| Thickness   |         |           |             |              |
| MM          |         |           |             |              |
+-------------+---------+-----------+-------------+--------------+
| AV Cusp     | 1.61    | cm        | [facility] |              |
| Seperation  |         |           |             |              |
| MM          |         |           |             |              |
+-------------+---------+-----------+-------------+--------------+
| LA Systolic | 4.4     | cm        | [facility] |              |
|  Diameter   |         |           |             |              |
 
| MM          |         |           |             |              |
+-------------+---------+-----------+-------------+--------------+
| LVEF-TTE    | 55      | %         | [facility] |              |
| TRANSTHORAC |         |           |             |              |
| IC ECHO     |         |           |             |              |
+-------------+---------+-----------+-------------+--------------+
| RA PRESSURE | 8       | mmHg      | [facility] |              |
+-------------+---------+-----------+-------------+--------------+
| RVSP        | 26      | mmHg      | [facility] |              |
| Estimated   |         |           |             |              |
+-------------+---------+-----------+-------------+--------------+
 
 
 
+----------+
| Specimen |
+----------+
|          |
+----------+
 
 
 
+------------------------------------------------------------------------+---------------+
| Narrative                                                              | Performed At  |
+------------------------------------------------------------------------+---------------+
|   This result has an attachment that is not available.  1.   Mild      |   [facility] |
| biatrial dilatation.2.   Normal left ventricular size with a mild      |               |
| concentric left ventricular hypertrophy.   Left ventricular systolic   |               |
| function is preserved.   LVEF is 55-60%.3.   Mildly thickened and      |               |
| calcified trileaflet aortic valve with adequate opening.   There is    |               |
| aortic valve sclerosis without significant aortic valve stenosis.4.    |               |
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
| change.   There is a mild mitral valve regurgitation.5.   Mild mitral  |               |
| annular calcification.6.   Mild tricuspid valve regurgitation.7.       |               |
|   Normal right-sided pressure.   8.   Dilated IVC with a normal        |               |
| respiratory collapse.9.   When compared to echocardiography on         |               |
| 2018, no significant changes.                                     |               |
|8.   Dilated IVC with a normal respiratory collapse.                    |               |
|9.   When compared to echocardiography on 2018, no significant    |               |
|changes.                                                               |               |
+------------------------------------------------------------------------+---------------+
 
 
 
+---------------+---------+--------------------+--------------+
| Performing    | Address | City/State[facility] | Phone Number |
| Organization  |         |                    |              |
+---------------+---------+--------------------+--------------+
|   [facility] |         |                    |              |
+---------------+---------+--------------------+--------------+
 NM Nuclear Stress Test (Vasodilator) (10/30/2019 12:56 PM PDT)
 
+-------------+--------+-----------+-------------+--------------+
| Component   | Value  | Ref Range | Performed   | Pathologist  |
|             |        |           | At          | Signature    |
+-------------+--------+-----------+-------------+--------------+
| BASELINE    | 93     | bpm       | [facility] |              |
| HEART RATE  |        |           |             |              |
+-------------+--------+-----------+-------------+--------------+
| BASELINE    | 99/51  | mmHg      | [facility] |              |
 
| BLOOD       |        |           |             |              |
| PRESSURE    |        |           |             |              |
+-------------+--------+-----------+-------------+--------------+
| PEAK HEART  | 109    |           | [facility] |              |
| RATE        |        |           |             |              |
+-------------+--------+-----------+-------------+--------------+
| PEAK BLOOD  | 111/42 | mmHG      | [facility] |              |
| PRESSURE    |        |           |             |              |
+-------------+--------+-----------+-------------+--------------+
| Target HR   | 126    |           | [facility] |              |
+-------------+--------+-----------+-------------+--------------+
| Percent HR  | 74     |           | [facility] |              |
+-------------+--------+-----------+-------------+--------------+
| LVEF-SPECT  | 63     | %         | [facility] |              |
| NUCLEAR     |        |           |             |              |
| STRESS/VIAB |        |           |             |              |
| ILITY       |        |           |             |              |
+-------------+--------+-----------+-------------+--------------+
| ST          | 0.0    | mm        | [facility] |              |
| Elevation   |        |           |             |              |
| (mm)        |        |           |             |              |
+-------------+--------+-----------+-------------+--------------+
 
 
 
+----------+
| Specimen |
+----------+
|          |
+----------+
 
 
 
+----------------------------------------------------------------------+---------------+
| Narrative                                                            | Performed At  |
+----------------------------------------------------------------------+---------------+
|   This result has an attachment that is not available.  1.           |   [facility] |
|   Persantine EKG is negative.2.   Normal Persantine sestamibi        |               |
| myocardial perfusion imaging study.   Normal left ventricular size,  |               |
| wall thickness and motion.   Preserved left ventricular systolic     |               |
| function.   LVEF by gated SPECT is 63%.                              |               |
+----------------------------------------------------------------------+---------------+
 
 
 
+---------------+---------+--------------------+--------------+
| Performing    | Address | City/State/Zipcode | Phone Number |
| Organization  |         |                    |              |
+---------------+---------+--------------------+--------------+
|   [facility] |         |                    |              |
+---------------+---------+--------------------+--------------+
 from Last 3 Months
 
 Insurance
 
 
+-----------------+--------+-------------+--------+-------------+---------+--------+
| Payer           | Benefi | Subscriber  | Effect | Phone       | Address | Type   |
|                 | t Plan | ID          | jony    |             |         |        |
|                 |  /     |             | Dates  |             |         |        |
 
|                 | Group  |             |        |             |         |        |
+-----------------+--------+-------------+--------+-------------+---------+--------+
| MEDICARE        | MEDICA | [id number] | 3/1/20 | [phone] |         | [employer] |
|                 | RE     |             | 10-Pre | 5           |         | re     |
|                 | PART A |             | sent   |             |         |        |
|                 |  AND B |             |        |             |         |        |
+-----------------+--------+-------------+--------+-------------+---------+--------+
| [employer] [place] | MUTUAL | [id number]    |  | [phone] |         | Indemn |
|                 |  OF    |             | 016-Pr | 0           |         | ity    |
|                 | [place]  |             | esent  |             |         |        |
+-----------------+--------+-------------+--------+-------------+---------+--------+
 
 
 
+----------------+--------+-------------+--------+-------------+---------------------+
| Guarantor Name | [employer]oun | Relation to | Date   | Phone       | Billing Address     |
|                | t Type |  Patient    | of     |             |                     |
|                |        |             | Birth  |             |                     |
+----------------+--------+-------------+--------+-------------+---------------------+
| Viviana Ricci Deedee | Person | Self        | / |             |   [address]   |
|                | al/Fam |             | 1945   | [phone] | [place] [address] |
|                | carole    |             |        | 0 (Home)    |                     |
+----------------+--------+-------------+--------+-------------+---------------------+
 
 
 
 Advance Directives
 
 
+-----------+-----------------+----------------+-------------+
| Type      | Date Recorded   | Patient        | Explanation |
|           |                 | Representative |             |
+-----------+-----------------+----------------+-------------+
| Power of  |                 |                |             |
|   |                 |                |             |
+-----------+-----------------+----------------+-------------+
| Advance   | 8/15/2018 12:10 |                |             |
| Directive |  PM             |                |             |
+-----------+-----------------+----------------+-------------+

## 2020-01-08 NOTE — XMS
Encounter Summary
  Created on: 2020
 
 Viviana Ricci
 External Reference #: [id number]
 : 47
 Sex: Female
 
 Demographics
 
 
+-----------------------+----------------------+
| Address               | [address]      |
|                       | [place]  [address] |
+-----------------------+----------------------+
| Home Phone            | [phone]      |
+-----------------------+----------------------+
| Preferred Language    | Unknown              |
+-----------------------+----------------------+
| Marital Status        | Single               |
+-----------------------+----------------------+
| Advent Affiliation | [id number]                 |
+-----------------------+----------------------+
| Race                  | Unknown              |
+-----------------------+----------------------+
| Ethnic Group          | Unknown              |
+-----------------------+----------------------+
 
 
 Author
 
 
+--------------+--------------------------------------------+
| Author       | [facility]  |
|              | and [facility]                                |
+--------------+--------------------------------------------+
| Organization | [facility]  |
|              | and [facility]                                |
+--------------+--------------------------------------------+
| Address      | Unknown                                    |
+--------------+--------------------------------------------+
| Phone        | Unavailable                                |
+--------------+--------------------------------------------+
 
 
 
 Support
 
 
+----------------+--------------+---------------------+-----------------+
| Name           | Relationship | Address             | Phone           |
+----------------+--------------+---------------------+-----------------+
| Ada/Ed Radhames | ECON         | [address]              | [phone] |
|                |              | [place]  |                 |
|                |              |  [address]              |                 |
+----------------+--------------+---------------------+-----------------+
 
 
 
 
 Care Team Providers
 
 
+-----------------------+------+-------------+
| Care Team Member Name | Role | Phone       |
+-----------------------+------+-------------+
 PCP  | Unavailable |
+-----------------------+------+-------------+
 
 
 
 Encounter Details
 
 
+--------+----------+---------------------+----------------------+-------------+
| Date   | Type     | Department          | Care Team            | Description |
+--------+----------+---------------------+----------------------+-------------+
| / | Abstract |   [facility]  [place]         |   Marzena Moser  |             |
|    |          | NEPHROLOGY  [address]   | M, DO  [address]      |             |
|        |          | [address]   | [address]      |             |
|        |          | [place], [place]     | [place]      |             |
|        |          | [address]          | [address]  [phone]  |             |
|        |          | [phone]        |  [phone] (Fax)  |             |
+--------+----------+---------------------+----------------------+-------------+
 
 
 
 Social History
 
 
+--------------+-------+-----------+--------+------+
| Tobacco Use  | Types | Packs/Day | Years  | Date |
|              |       |           | Used   |      |
+--------------+-------+-----------+--------+------+
| Never Smoker |       |           |        |      |
+--------------+-------+-----------+--------+------+
 
 
 
+---------------------+---+---+---+
| Smokeless Tobacco:  |   |   |   |
| Never Used          |   |   |   |
+---------------------+---+---+---+
 
 
 
+---------------------------------------------------------------+
| Comments: some second hand smoke exposure, but fairly minimal |
+---------------------------------------------------------------+
 
 
 
+-------------+-------------+---------+----------+
| Alcohol Use | Drinks/Week | oz/Week | Comments |
+-------------+-------------+---------+----------+
| No          |             |         |          |
+-------------+-------------+---------+----------+
 
 
 
 
+------------------+---------------+
| Sex Assigned at  | Date Recorded |
| Birth            |               |
+------------------+---------------+
| Not on file      |               |
+------------------+---------------+
 
 
 
+----------------+-------------+-------------+
| Job Start Date | Occupation  | Industry    |
+----------------+-------------+-------------+
| Not on file    | Not on file | Not on file |
+----------------+-------------+-------------+
 
 
 
+----------------+--------------+------------+
| Travel History | Travel Start | Travel End |
+----------------+--------------+------------+
 
 
 
+-------------------------------------+
| No recent travel history available. |
+-------------------------------------+
 documented as of this encounter
 
 Plan of Treatment
 
 
+--------+-----------+------------+----------------------+-------------------+
| Date   | Type      | Specialty  | Care Team            | Description       |
+--------+-----------+------------+----------------------+-------------------+
| / | Office    | Cardiology |   Tonia Wilson,    |                   |
|    | Visit     |            | ARNJESSICA  [address]   |                   |
|        |           |            | [place]  |                   |
|        |           |            | [address]  [phone]  |                   |
|        |           |            |  [phone] (Fax)  |                   |
+--------+-----------+------------+----------------------+-------------------+
| / | Hospital  | Radiology  |   Popeye Townsend, |                   |
|    | Encounter |            |  MD  [address] |                   |
|        |           |            |  St.  [place],   |                   |
|        |           |            | [place] [address]             |                   |
|        |           |            | [phone]         |                   |
|        |           |            | [phone] (Fax)   |                   |
+--------+-----------+------------+----------------------+-------------------+
| / | Surgery   | Radiology  |   Popeye Townsend, | CV EP PPM SYSTEM  |
|    |           |            |  MD  [address] | IMPLANT           |
|        |           |            |  St.  [place],   |                   |
|        |           |            | [place] [address]             |                   |
|        |           |            | [phone]         |                   |
|        |           |            | [phone] (Fax)   |                   |
+--------+-----------+------------+----------------------+-------------------+
| 02/10/ | Clinical  | Cardiology |                      |                   |
|    | Support   |            |                      |                   |
+--------+-----------+------------+----------------------+-------------------+
| / | Office    | Cardiology |   Tonia Wilson,    |                   |
|    | Visit     |            | ARNP  [address]   |                   |
 
|        |           |            | St  [place], [place]  |                   |
|        |           |            | [address]  [phone]  |                   |
|        |           |            |  [phone] (Fax)  |                   |
+--------+-----------+------------+----------------------+-------------------+
| / | Off-Site  | Nephrology |   Marzena Moser  |                   |
| 2020   | Visit     |            | DO ETIENNE  [address] [place] [address]      |                   |
|        |           |            | [address]      |                   |
|        |           |            | [place]      |                   |
|        |           |            | [address]  [phone]  |                   |
|        |           |            |  [phone] (Fax)  |                   |
+--------+-----------+------------+----------------------+-------------------+
 documented as of this encounter
 
 Procedures
 
 
+----------------------+--------+-------------+----------------------+----------------------
+
| Procedure Name       | Priori | Date/Time   | Associated Diagnosis | Comments             
|
|                      | ty     |             |                      |                      
|
+----------------------+--------+-------------+----------------------+----------------------
+
| EXTERNAL LAB:        | Routin | 2015  |                      |   Results for this   
|
| PHOSPHORUS           | e      |  2:05 AM    |                      | procedure are in the 
|
|                      |        | PDT         |                      |  results section.    
|
+----------------------+--------+-------------+----------------------+----------------------
+
| EXTERNAL LAB: BUN    | Routin | 2015  |                      |   Results for this   
|
|                      | e      |             |                      | procedure are in the 
|
|                      |        |             |                      |  results section.    
|
+----------------------+--------+-------------+----------------------+----------------------
+
| EXTERNAL LAB:        | Routin | 2015  |                      |   Results for this   
|
| GLUCOSE              | e      |             |                      | procedure are in the 
|
|                      |        |             |                      |  results section.    
|
+----------------------+--------+-------------+----------------------+----------------------
+
| EXTERNAL LAB:        | Routin | 2015  |                      |   Results for this   
|
| TACROLIMUS LEVEL,    | e      |             |                      | procedure are in the 
|
| CMIA                 |        |             |                      |  results section.    
|
+----------------------+--------+-------------+----------------------+----------------------
+
| EXTERNAL LAB: BK     | Routin | 2015  |                      |   Results for this   
|
| VIRUS, NAAT, URINE,  | e      |             |                      | procedure are in the 
|
 
| QUANT                |        |             |                      |  results section.    
|
+----------------------+--------+-------------+----------------------+----------------------
+
| EXTERNAL LAB: ALT    | Routin | 2015  |                      |   Results for this   
|
|                      | e      |             |                      | procedure are in the 
|
|                      |        |             |                      |  results section.    
|
+----------------------+--------+-------------+----------------------+----------------------
+
| EXTERNAL LAB: AST    | Routin | 2015  |                      |   Results for this   
|
|                      | e      |             |                      | procedure are in the 
|
|                      |        |             |                      |  results section.    
|
+----------------------+--------+-------------+----------------------+----------------------
+
| EXTERNAL LAB:        | Routin | 2015  |                      |   Results for this   
|
| ALKALINE PHOSPHATASE | e      |             |                      | procedure are in the 
|
|                      |        |             |                      |  results section.    
|
+----------------------+--------+-------------+----------------------+----------------------
+
| EXTERNAL LAB:        | Routin | 2015  |                      |   Results for this   
|
| BILIRUBIN, TOTAL     | e      |             |                      | procedure are in the 
|
|                      |        |             |                      |  results section.    
|
+----------------------+--------+-------------+----------------------+----------------------
+
| EXTERNAL LAB:        | Routin | 2015  |                      |   Results for this   
|
| ALBUMIN              | e      |             |                      | procedure are in the 
|
|                      |        |             |                      |  results section.    
|
+----------------------+--------+-------------+----------------------+----------------------
+
| EXTERNAL LAB:        | Routin | 2015  |                      |   Results for this   
|
| PROTEIN, TOTAL       | e      |             |                      | procedure are in the 
|
|                      |        |             |                      |  results section.    
|
+----------------------+--------+-------------+----------------------+----------------------
+
| EXTERNAL LAB:        | Routin | 2015  |                      |   Results for this   
|
| MAGNESIUM            | e      |             |                      | procedure are in the 
|
|                      |        |             |                      |  results section.    
|
+----------------------+--------+-------------+----------------------+----------------------
+
 
| EXTERNAL LAB:        | Routin | 2015  |                      |   Results for this   
|
| CALCIUM              | e      |             |                      | procedure are in the 
|
|                      |        |             |                      |  results section.    
|
+----------------------+--------+-------------+----------------------+----------------------
+
| EXTERNAL LAB: CARBON | Routin | 2015  |                      |   Results for this   
|
|  DIOXIDE             | e      |             |                      | procedure are in the 
|
|                      |        |             |                      |  results section.    
|
+----------------------+--------+-------------+----------------------+----------------------
+
| EXTERNAL LAB:        | Routin | 2015  |                      |   Results for this   
|
| CHLORIDE             | e      |             |                      | procedure are in the 
|
|                      |        |             |                      |  results section.    
|
+----------------------+--------+-------------+----------------------+----------------------
+
| EXTERNAL LAB:        | Routin | 2015  |                      |   Results for this   
|
| POTASSIUM            | e      |             |                      | procedure are in the 
|
|                      |        |             |                      |  results section.    
|
+----------------------+--------+-------------+----------------------+----------------------
+
| EXTERNAL LAB: SODIUM | Routin | 2015  |                      |   Results for this   
|
|                      | e      |             |                      | procedure are in the 
|
|                      |        |             |                      |  results section.    
|
+----------------------+--------+-------------+----------------------+----------------------
+
| EXTERNAL LAB: EVY,   | Routin | 2015  |                      |   Results for this   
|
| INTACT               | e      |             |                      | procedure are in the 
|
|                      |        |             |                      |  results section.    
|
+----------------------+--------+-------------+----------------------+----------------------
+
| EXTERNAL LAB: BILLY    | Routin | 2015  |                      |   Results for this   
|
|                      | e      |             |                      | procedure are in the 
|
|                      |        |             |                      |  results section.    
|
+----------------------+--------+-------------+----------------------+----------------------
+
| EXTERNAL LAB:        | Routin | 2015  |                      |   Results for this   
|
| TRIGLYCERIDES        | e      |             |                      | procedure are in the 
|
 
|                      |        |             |                      |  results section.    
|
+----------------------+--------+-------------+----------------------+----------------------
+
| EXTERNAL LAB:        | Routin | 2015  |                      |   Results for this   
|
| CHOLESTEROL, HDL     | e      |             |                      | procedure are in the 
|
|                      |        |             |                      |  results section.    
|
+----------------------+--------+-------------+----------------------+----------------------
+
| EXTERNAL LAB:        | Routin | 2015  |                      |   Results for this   
|
| CHOLESTEROL, TOTAL   | e      |             |                      | procedure are in the 
|
|                      |        |             |                      |  results section.    
|
+----------------------+--------+-------------+----------------------+----------------------
+
| EXTERNAL LAB:        | Routin | 2015  |                      |   Results for this   
|
| CHOLESTEROL, LDL     | e      |             |                      | procedure are in the 
|
|                      |        |             |                      |  results section.    
|
+----------------------+--------+-------------+----------------------+----------------------
+
| EXTERNAL LAB: EGFR   | Routin | 2015  |                      |   Results for this   
|
|                      | e      |             |                      | procedure are in the 
|
|                      |        |             |                      |  results section.    
|
+----------------------+--------+-------------+----------------------+----------------------
+
| EXTERNAL LAB:        | Routin | 2015  |                      |   Results for this   
|
| CREATININE           | e      |             |                      | procedure are in the 
|
|                      |        |             |                      |  results section.    
|
+----------------------+--------+-------------+----------------------+----------------------
+
| HEMOGLOBIN A1C       | Routin | 2015  |                      |   Results for this   
|
|                      | e      |             |                      | procedure are in the 
|
|                      |        |             |                      |  results section.    
|
+----------------------+--------+-------------+----------------------+----------------------
+
 documented in this encounter
 
 Results
 External Lab: Phosphorus (2015  2:05 AM PDT)
 
+-------------+-------+-----------+------------+--------------+
| Component   | Value | Ref Range | Performed  | Pathologist  |
|             |       |           | At         | Signature    |
 
+-------------+-------+-----------+------------+--------------+
| Phosphorus, | 2.8   | 2.5 - 5   | EXTERNAL   |              |
|  External   |       |           | LAB        |              |
+-------------+-------+-----------+------------+--------------+
 
 
 
+--------------------------+
| Resulting Agency Comment |
+--------------------------+
|   [facility]              |
+--------------------------+
 
 
 
+----------------+---------+--------------------+--------------+
| Performing     | Address | City/State/Zipcode | Phone Number |
| Organization   |         |                    |              |
+----------------+---------+--------------------+--------------+
|   EXTERNAL LAB |         |                    |              |
+----------------+---------+--------------------+--------------+
 External Lab: Bk Virus, NAAT Urine, Quant (2015)
 
+-------------+--------+-----------+------------+--------------+
| Component   | Value  | Ref Range | Performed  | Pathologist  |
|             |        |           | At         | Signature    |
+-------------+--------+-----------+------------+--------------+
| BKV, Urine, | 74,500 |           | EXTERNAL   |              |
|  PCR,       |        |           | LAB        |              |
| External    |        |           |            |              |
+-------------+--------+-----------+------------+--------------+
 
 
 
+----------+
| Specimen |
+----------+
|          |
+----------+
 
 
 
+----------------+---------+--------------------+--------------+
| Performing     | Address | City/State/Zipcode | Phone Number |
| Organization   |         |                    |              |
+----------------+---------+--------------------+--------------+
|   EXTERNAL LAB |         |                    |              |
+----------------+---------+--------------------+--------------+
 External Lab: Tacrolimus Level, CMIA (2015)
 
+-------------+-------+-----------+------------+--------------+
| Component   | Value | Ref Range | Performed  | Pathologist  |
|             |       |           | At         | Signature    |
+-------------+-------+-----------+------------+--------------+
| Tacrolimus, | 5.5   |           | EXTERNAL   |              |
|  CMIA,      |       |           | LAB        |              |
| External    |       |           |            |              |
+-------------+-------+-----------+------------+--------------+
 
 
 
 
+----------+
| Specimen |
+----------+
|          |
+----------+
 
 
 
+----------------+---------+--------------------+--------------+
| Performing     | Address | City/State/Zipcode | Phone Number |
| Organization   |         |                    |              |
+----------------+---------+--------------------+--------------+
|   EXTERNAL LAB |         |                    |              |
+----------------+---------+--------------------+--------------+
 External Lab: PTH, Intact (2015)
 
+-------------+-------+-----------+------------+--------------+
| Component   | Value | Ref Range | Performed  | Pathologist  |
|             |       |           | At         | Signature    |
+-------------+-------+-----------+------------+--------------+
| PTH Intact, | 203.6 |           | EXTERNAL   |              |
|  External   |       |           | LAB        |              |
+-------------+-------+-----------+------------+--------------+
 
 
 
+----------+
| Specimen |
+----------+
|          |
+----------+
 
 
 
+----------------+---------+--------------------+--------------+
| Performing     | Address | City/State/Zipcode | Phone Number |
| Organization   |         |                    |              |
+----------------+---------+--------------------+--------------+
|   EXTERNAL LAB |         |                    |              |
+----------------+---------+--------------------+--------------+
 Hemoglobin A1C (2015)
 
+-------------+-------+-----------+------------+--------------+
| Component   | Value | Ref Range | Performed  | Pathologist  |
|             |       |           | At         | Signature    |
+-------------+-------+-----------+------------+--------------+
| Hemoglobin  | 7.0   | %         | EXTERNAL   |              |
| A1c         |       |           | LAB        |              |
+-------------+-------+-----------+------------+--------------+
 
 
 
+-----------------+
| Specimen        |
+-----------------+
| Blood specimen  |
| (specimen)      |
+-----------------+
 
 
 
 
+--------------------------+
| Resulting Agency Comment |
+--------------------------+
|   [facility]              |
+--------------------------+
 
 
 
+----------------+---------+--------------------+--------------+
| Performing     | Address | City/State/Zipcode | Phone Number |
| Organization   |         |                    |              |
+----------------+---------+--------------------+--------------+
|   EXTERNAL LAB |         |                    |              |
+----------------+---------+--------------------+--------------+
 External Lab: ALMA (2015)
 
+-----------+--------+-----------+------------+--------------+
| Component | Value  | Ref Range | Performed  | Pathologist  |
|           |        |           | At         | Signature    |
+-----------+--------+-----------+------------+--------------+
| ALMA,      | 33 (A) | 6 - 23    | EXTERNAL   |              |
| External  |        |           | LAB        |              |
+-----------+--------+-----------+------------+--------------+
 
 
 
+--------------------------+
| Resulting Agency Comment |
+--------------------------+
|   [facility]              |
+--------------------------+
 
 
 
+----------------+---------+--------------------+--------------+
| Performing     | Address | City/State/Zipcode | Phone Number |
| Organization   |         |                    |              |
+----------------+---------+--------------------+--------------+
|   EXTERNAL LAB |         |                    |              |
+----------------+---------+--------------------+--------------+
 External Lab: Glucose (2015)
 
+-----------+---------+-----------+------------+--------------+
| Component | Value   | Ref Range | Performed  | Pathologist  |
|           |         |           | At         | Signature    |
+-----------+---------+-----------+------------+--------------+
| Glucose,  | 128 (A) | 70 - 100  | EXTERNAL   |              |
| External  |         |           | LAB        |              |
+-----------+---------+-----------+------------+--------------+
 
 
 
+--------------------------+
| Resulting Agency Comment |
+--------------------------+
|   [facility]              |
+--------------------------+
 
 
 
 
+----------------+---------+--------------------+--------------+
| Performing     | Address | City/State/Zipcode | Phone Number |
| Organization   |         |                    |              |
+----------------+---------+--------------------+--------------+
|   EXTERNAL LAB |         |                    |              |
+----------------+---------+--------------------+--------------+
 External Lab: ALT (2015)
 
+-----------+-------+-----------+------------+--------------+
| Component | Value | Ref Range | Performed  | Pathologist  |
|           |       |           | At         | Signature    |
+-----------+-------+-----------+------------+--------------+
| ALT,      | 16    | 7 - 52    | EXTERNAL   |              |
| External  |       |           | LAB        |              |
+-----------+-------+-----------+------------+--------------+
 
 
 
+--------------------------+
| Resulting Agency Comment |
+--------------------------+
|   [facility]              |
+--------------------------+
 
 
 
+----------------+---------+--------------------+--------------+
| Performing     | Address | City/State/Zipcode | Phone Number |
| Organization   |         |                    |              |
+----------------+---------+--------------------+--------------+
|   EXTERNAL LAB |         |                    |              |
+----------------+---------+--------------------+--------------+
 External Lab: AST (2015)
 
+-----------+-------+-----------+------------+--------------+
| Component | Value | Ref Range | Performed  | Pathologist  |
|           |       |           | At         | Signature    |
+-----------+-------+-----------+------------+--------------+
| AST,      | 24    | 13 - 39   | EXTERNAL   |              |
| External  |       |           | LAB        |              |
+-----------+-------+-----------+------------+--------------+
 
 
 
+--------------------------+
| Resulting Agency Comment |
+--------------------------+
|   [facility]              |
+--------------------------+
 
 
 
+----------------+---------+--------------------+--------------+
| Performing     | Address | City/State/Zipcode | Phone Number |
| Organization   |         |                    |              |
+----------------+---------+--------------------+--------------+
|   EXTERNAL LAB |         |                    |              |
+----------------+---------+--------------------+--------------+
 
 External Lab: Alkaline Phosphatase (2015)
 
+-----------+-------+-----------+------------+--------------+
| Component | Value | Ref Range | Performed  | Pathologist  |
|           |       |           | At         | Signature    |
+-----------+-------+-----------+------------+--------------+
| ALP,      | 93    | 30 - 128  | EXTERNAL   |              |
| External  |       |           | LAB        |              |
+-----------+-------+-----------+------------+--------------+
 
 
 
+--------------------------+
| Resulting Agency Comment |
+--------------------------+
|   [facility]              |
+--------------------------+
 
 
 
+----------------+---------+--------------------+--------------+
| Performing     | Address | City/State/Zipcode | Phone Number |
| Organization   |         |                    |              |
+----------------+---------+--------------------+--------------+
|   EXTERNAL LAB |         |                    |              |
+----------------+---------+--------------------+--------------+
 External Lab: Bilirubin, Total (2015)
 
+-------------+-------+-----------+------------+--------------+
| Component   | Value | Ref Range | Performed  | Pathologist  |
|             |       |           | At         | Signature    |
+-------------+-------+-----------+------------+--------------+
| Bilirubin,  | 0.5   | 0 - 1.2   | EXTERNAL   |              |
| Total,      |       |           | LAB        |              |
| External    |       |           |            |              |
+-------------+-------+-----------+------------+--------------+
 
 
 
+--------------------------+
| Resulting Agency Comment |
+--------------------------+
|   [facility]              |
+--------------------------+
 
 
 
+----------------+---------+--------------------+--------------+
| Performing     | Address | City/State/Zipcode | Phone Number |
| Organization   |         |                    |              |
+----------------+---------+--------------------+--------------+
|   EXTERNAL LAB |         |                    |              |
+----------------+---------+--------------------+--------------+
 External Lab: Albumin (2015)
 
+-----------+-------+-----------+------------+--------------+
| Component | Value | Ref Range | Performed  | Pathologist  |
|           |       |           | At         | Signature    |
+-----------+-------+-----------+------------+--------------+
| Albumin,  | 3.5   | 3.5 - 5   | EXTERNAL   |              |
 
| External  |       |           | LAB        |              |
+-----------+-------+-----------+------------+--------------+
 
 
 
+--------------------------+
| Resulting Agency Comment |
+--------------------------+
|   [facility]              |
+--------------------------+
 
 
 
+----------------+---------+--------------------+--------------+
| Performing     | Address | City/State/Zipcode | Phone Number |
| Organization   |         |                    |              |
+----------------+---------+--------------------+--------------+
|   EXTERNAL LAB |         |                    |              |
+----------------+---------+--------------------+--------------+
 External Lab: Protein, Total (2015)
 
+-----------+---------+-----------+------------+--------------+
| Component | Value   | Ref Range | Performed  | Pathologist  |
|           |         |           | At         | Signature    |
+-----------+---------+-----------+------------+--------------+
| Protein,  | 5.5 (A) | 6 - 8     | EXTERNAL   |              |
| Total,    |         |           | LAB        |              |
| External  |         |           |            |              |
+-----------+---------+-----------+------------+--------------+
 
 
 
+--------------------------+
| Resulting Agency Comment |
+--------------------------+
|   [facility]              |
+--------------------------+
 
 
 
+----------------+---------+--------------------+--------------+
| Performing     | Address | City/State/Zipcode | Phone Number |
| Organization   |         |                    |              |
+----------------+---------+--------------------+--------------+
|   EXTERNAL LAB |         |                    |              |
+----------------+---------+--------------------+--------------+
 External Lab: Magnesium (2015)
 
+-------------+-------+-----------+------------+--------------+
| Component   | Value | Ref Range | Performed  | Pathologist  |
|             |       |           | At         | Signature    |
+-------------+-------+-----------+------------+--------------+
| Magnesium,  | 1.7   | 1.7 - 2.5 | EXTERNAL   |              |
| External    |       |           | LAB        |              |
+-------------+-------+-----------+------------+--------------+
 
 
 
+--------------------------+
| Resulting Agency Comment |
 
+--------------------------+
|   [facility]              |
+--------------------------+
 
 
 
+----------------+---------+--------------------+--------------+
| Performing     | Address | City/State/Zipcode | Phone Number |
| Organization   |         |                    |              |
+----------------+---------+--------------------+--------------+
|   EXTERNAL LAB |         |                    |              |
+----------------+---------+--------------------+--------------+
 External Lab: Calcium (2015)
 
+-----------+-------+------------+------------+--------------+
| Component | Value | Ref Range  | Performed  | Pathologist  |
|           |       |            | At         | Signature    |
+-----------+-------+------------+------------+--------------+
| Calcium,  | 10.1  | 8.4 - 10.2 | EXTERNAL   |              |
| External  |       |            | LAB        |              |
+-----------+-------+------------+------------+--------------+
 
 
 
+--------------------------+
| Resulting Agency Comment |
+--------------------------+
|   [facility]              |
+--------------------------+
 
 
 
+----------------+---------+--------------------+--------------+
| Performing     | Address | City/State/Zipcode | Phone Number |
| Organization   |         |                    |              |
+----------------+---------+--------------------+--------------+
|   EXTERNAL LAB |         |                    |              |
+----------------+---------+--------------------+--------------+
 External Lab: Carbon Dioxide (2015)
 
+-----------+-------+-----------+------------+--------------+
| Component | Value | Ref Range | Performed  | Pathologist  |
|           |       |           | At         | Signature    |
+-----------+-------+-----------+------------+--------------+
| Carbon    | 20    | 19 - 31   | EXTERNAL   |              |
| Dioxide,  |       |           | LAB        |              |
| External  |       |           |            |              |
+-----------+-------+-----------+------------+--------------+
 
 
 
+--------------------------+
| Resulting Agency Comment |
+--------------------------+
|   [facility]              |
+--------------------------+
 
 
 
+----------------+---------+--------------------+--------------+
 
| Performing     | Address | City/State/Zipcode | Phone Number |
| Organization   |         |                    |              |
+----------------+---------+--------------------+--------------+
|   EXTERNAL LAB |         |                    |              |
+----------------+---------+--------------------+--------------+
 External Lab: Chloride (2015)
 
+------------+-------+-----------+------------+--------------+
| Component  | Value | Ref Range | Performed  | Pathologist  |
|            |       |           | At         | Signature    |
+------------+-------+-----------+------------+--------------+
| Chloride,  | 110   | 95 - 112  | EXTERNAL   |              |
| External   |       |           | LAB        |              |
+------------+-------+-----------+------------+--------------+
 
 
 
+--------------------------+
| Resulting Agency Comment |
+--------------------------+
|   [facility]              |
+--------------------------+
 
 
 
+----------------+---------+--------------------+--------------+
| Performing     | Address | City/State/Zipcode | Phone Number |
| Organization   |         |                    |              |
+----------------+---------+--------------------+--------------+
|   EXTERNAL LAB |         |                    |              |
+----------------+---------+--------------------+--------------+
 External Lab: Potassium (2015)
 
+-------------+---------+-----------+------------+--------------+
| Component   | Value   | Ref Range | Performed  | Pathologist  |
|             |         |           | At         | Signature    |
+-------------+---------+-----------+------------+--------------+
| Potassium,  | 5.3 (A) | 3.6 - 5.1 | EXTERNAL   |              |
| External    |         |           | LAB        |              |
+-------------+---------+-----------+------------+--------------+
 
 
 
+--------------------------+
| Resulting Agency Comment |
+--------------------------+
|   [facility]              |
+--------------------------+
 
 
 
+----------------+---------+--------------------+--------------+
| Performing     | Address | City/State/Zipcode | Phone Number |
| Organization   |         |                    |              |
+----------------+---------+--------------------+--------------+
|   EXTERNAL LAB |         |                    |              |
+----------------+---------+--------------------+--------------+
 External Lab: Sodium (2015)
 
+-----------+-------+-----------+------------+--------------+
 
| Component | Value | Ref Range | Performed  | Pathologist  |
|           |       |           | At         | Signature    |
+-----------+-------+-----------+------------+--------------+
| Sodium,   | 139   | 132 - 143 | EXTERNAL   |              |
| External  |       |           | LAB        |              |
+-----------+-------+-----------+------------+--------------+
 
 
 
+--------------------------+
| Resulting Agency Comment |
+--------------------------+
|   [facility]              |
+--------------------------+
 
 
 
+----------------+---------+--------------------+--------------+
| Performing     | Address | City/State/Zipcode | Phone Number |
| Organization   |         |                    |              |
+----------------+---------+--------------------+--------------+
|   EXTERNAL LAB |         |                    |              |
+----------------+---------+--------------------+--------------+
 External Lab: CBC (2015)
 
+-------------+---------+-----------+------------+--------------+
| Component   | Value   | Ref Range | Performed  | Pathologist  |
|             |         |           | At         | Signature    |
+-------------+---------+-----------+------------+--------------+
| WBC,        | 3.8 (A) | 4.5 - 11  | EXTERNAL   |              |
| External    |         |           | LAB        |              |
+-------------+---------+-----------+------------+--------------+
| HGB,        | 13.3    | 12 - 16   | EXTERNAL   |              |
| External    |         |           | LAB        |              |
+-------------+---------+-----------+------------+--------------+
| HCT,        | 39.9    | 35 - 45   | EXTERNAL   |              |
| External    |         |           | LAB        |              |
+-------------+---------+-----------+------------+--------------+
| PLT,        | 155     | 140 - 440 | EXTERNAL   |              |
| External    |         |           | LAB        |              |
+-------------+---------+-----------+------------+--------------+
| Neutrophils | 53.9    | 39 - 80   | EXTERNAL   |              |
|   %,        |         |           | LAB        |              |
| External    |         |           |            |              |
+-------------+---------+-----------+------------+--------------+
| Lymphocytes | 32.8    | 24 - 44   | EXTERNAL   |              |
|  %,         |         |           | LAB        |              |
| External    |         |           |            |              |
+-------------+---------+-----------+------------+--------------+
| Monocytes   | 10.5    | 0 - 12    | EXTERNAL   |              |
| %, External |         |           | LAB        |              |
+-------------+---------+-----------+------------+--------------+
| Eosinophils | 2.2     | 0 - 6     | EXTERNAL   |              |
|  %,         |         |           | LAB        |              |
| External    |         |           |            |              |
+-------------+---------+-----------+------------+--------------+
| RBC,        | 3.86    | 3.8 - 5.1 | EXTERNAL   |              |
| External    |         |           | LAB        |              |
+-------------+---------+-----------+------------+--------------+
| MCV,        | 104 (A) | 81 - 99   | EXTERNAL   |              |
 
| External    |         |           | LAB        |              |
+-------------+---------+-----------+------------+--------------+
| RDW,        | 14.4    | 10.5 - 15 | EXTERNAL   |              |
| External    |         |           | LAB        |              |
+-------------+---------+-----------+------------+--------------+
 
 
 
+--------------------------+
| Resulting Agency Comment |
+--------------------------+
|   [facility]              |
+--------------------------+
 
 
 
+----------------+---------+--------------------+--------------+
| Performing     | Address | City/State/Zipcode | Phone Number |
| Organization   |         |                    |              |
+----------------+---------+--------------------+--------------+
|   EXTERNAL LAB |         |                    |              |
+----------------+---------+--------------------+--------------+
 External Lab: Triglycerides (2015)
 
+-------------+---------+-----------+------------+--------------+
| Component   | Value   | Ref Range | Performed  | Pathologist  |
|             |         |           | At         | Signature    |
+-------------+---------+-----------+------------+--------------+
| Triglycerid | 194 (A) | 30 - 150  | EXTERNAL   |              |
| es,         |         |           | LAB        |              |
| External    |         |           |            |              |
+-------------+---------+-----------+------------+--------------+
 
 
 
+-----------------+
| Specimen        |
+-----------------+
| Blood specimen  |
| (specimen)      |
+-----------------+
 
 
 
+--------------------------+
| Resulting Agency Comment |
+--------------------------+
|   [facility]              |
+--------------------------+
 
 
 
+----------------+---------+--------------------+--------------+
| Performing     | Address | City/State/Zipcode | Phone Number |
| Organization   |         |                    |              |
+----------------+---------+--------------------+--------------+
|   EXTERNAL LAB |         |                    |              |
+----------------+---------+--------------------+--------------+
 External Lab: Cholesterol, HDL (2015)
 
 
+-------------+-------+-----------+------------+--------------+
| Component   | Value | Ref Range | Performed  | Pathologist  |
|             |       |           | At         | Signature    |
+-------------+-------+-----------+------------+--------------+
| HDL         | 47.9  | 40 mg/dl  | EXTERNAL   |              |
| Cholesterol |       |           | LAB        |              |
| , External  |       |           |            |              |
+-------------+-------+-----------+------------+--------------+
 
 
 
+-----------------+
| Specimen        |
+-----------------+
| Blood specimen  |
| (specimen)      |
+-----------------+
 
 
 
+--------------------------+
| Resulting Agency Comment |
+--------------------------+
|   [facility]              |
+--------------------------+
 
 
 
+----------------+---------+--------------------+--------------+
| Performing     | Address | City/State/Zipcode | Phone Number |
| Organization   |         |                    |              |
+----------------+---------+--------------------+--------------+
|   EXTERNAL LAB |         |                    |              |
+----------------+---------+--------------------+--------------+
 External Lab: Cholesterol, Total (2015)
 
+-------------+-------+-----------+------------+--------------+
| Component   | Value | Ref Range | Performed  | Pathologist  |
|             |       |           | At         | Signature    |
+-------------+-------+-----------+------------+--------------+
| Cholesterol | 156   | 200 mg/dl | EXTERNAL   |              |
| , Total,    |       |           | LAB        |              |
| External    |       |           |            |              |
+-------------+-------+-----------+------------+--------------+
 
 
 
+-----------------+
| Specimen        |
+-----------------+
| Blood specimen  |
| (specimen)      |
+-----------------+
 
 
 
+--------------------------+
| Resulting Agency Comment |
+--------------------------+
|   [facility]              |
 
+--------------------------+
 
 
 
+----------------+---------+--------------------+--------------+
| Performing     | Address | City/State/Zipcode | Phone Number |
| Organization   |         |                    |              |
+----------------+---------+--------------------+--------------+
|   EXTERNAL LAB |         |                    |              |
+----------------+---------+--------------------+--------------+
 External Lab: Cholesterol, LDL (2015)
 
+-------------+-------+-----------+------------+--------------+
| Component   | Value | Ref Range | Performed  | Pathologist  |
|             |       |           | At         | Signature    |
+-------------+-------+-----------+------------+--------------+
| LDL         | 69    | 100       | EXTERNAL   |              |
| Cholesterol |       |           | LAB        |              |
| , Direct,   |       |           |            |              |
| External    |       |           |            |              |
+-------------+-------+-----------+------------+--------------+
 
 
 
+-----------------+
| Specimen        |
+-----------------+
| Blood specimen  |
| (specimen)      |
+-----------------+
 
 
 
+--------------------------+
| Resulting Agency Comment |
+--------------------------+
|   [facility]              |
+--------------------------+
 
 
 
+----------------+---------+--------------------+--------------+
| Performing     | Address | City/State/Zipcode | Phone Number |
| Organization   |         |                    |              |
+----------------+---------+--------------------+--------------+
|   EXTERNAL LAB |         |                    |              |
+----------------+---------+--------------------+--------------+
 External Lab: eGFR (2015)
 
+-----------+-------+-----------+------------+--------------+
| Component | Value | Ref Range | Performed  | Pathologist  |
|           |       |           | At         | Signature    |
+-----------+-------+-----------+------------+--------------+
| eGFR,     | 44    |           | EXTERNAL   |              |
| External  |       |           | LAB        |              |
+-----------+-------+-----------+------------+--------------+
 
 
 
+-----------------+
 
| Specimen        |
+-----------------+
| Blood specimen  |
| (specimen)      |
+-----------------+
 
 
 
+--------------------------+
| Resulting Agency Comment |
+--------------------------+
|   [facility]              |
+--------------------------+
 
 
 
+----------------+---------+--------------------+--------------+
| Performing     | Address | City/State/Zipcode | Phone Number |
| Organization   |         |                    |              |
+----------------+---------+--------------------+--------------+
|   EXTERNAL LAB |         |                    |              |
+----------------+---------+--------------------+--------------+
 External Lab: Creatinine (2015)
 
+-------------+-------+------------+------------+--------------+
| Component   | Value | Ref Range  | Performed  | Pathologist  |
|             |       |            | At         | Signature    |
+-------------+-------+------------+------------+--------------+
| Creatinine, | 1.21  | 0.7 - 1.25 | EXTERNAL   |              |
|  External   |       |            | LAB        |              |
+-------------+-------+------------+------------+--------------+
 
 
 
+-----------------+
| Specimen        |
+-----------------+
| Blood specimen  |
| (specimen)      |
+-----------------+
 
 
 
+--------------------------+
| Resulting Agency Comment |
+--------------------------+
|   [facility]              |
+--------------------------+
 
 
 
+----------------+---------+--------------------+--------------+
| Performing     | Address | City/State/Zipcode | Phone Number |
| Organization   |         |                    |              |
+----------------+---------+--------------------+--------------+
|   EXTERNAL LAB |         |                    |              |
+----------------+---------+--------------------+--------------+
 documented in this encounter
 
 Visit Diagnoses
 
 Not on filedocumented in this encounter

## 2020-01-08 NOTE — XMS
Encounter Summary
  Created on: 2020
 
 Viviana Ricci
 External Reference #: [id number]
 : 47
 Sex: Female
 
 Demographics
 
 
+-----------------------+----------------------+
| Address               | [address]      |
|                       | [place]  [address] |
+-----------------------+----------------------+
| Home Phone            | [phone]      |
+-----------------------+----------------------+
| Preferred Language    | Unknown              |
+-----------------------+----------------------+
| Marital Status        | Single               |
+-----------------------+----------------------+
| Evangelical Affiliation | [id number]                 |
+-----------------------+----------------------+
| Race                  | Unknown              |
+-----------------------+----------------------+
| Ethnic Group          | Unknown              |
+-----------------------+----------------------+
 
 
 Author
 
 
+--------------+--------------------------------------------+
| Author       | [facility]  |
|              | and [facility]                                |
+--------------+--------------------------------------------+
| Organization | [facility]  |
|              | and [facility]                                |
+--------------+--------------------------------------------+
| Address      | Unknown                                    |
+--------------+--------------------------------------------+
| Phone        | Unavailable                                |
+--------------+--------------------------------------------+
 
 
 
 Support
 
 
+----------------+--------------+---------------------+-----------------+
| Name           | Relationship | Address             | Phone           |
+----------------+--------------+---------------------+-----------------+
| Ada/Ed Radhames | ECON         | [address]              | [phone] |
|                |              | [place]  |                 |
|                |              |  [id number]              |                 |
+----------------+--------------+---------------------+-----------------+
 
 
 
 
 Care Team Providers
 
 
+-----------------------+------+-------------+
| Care Team Member Name | Role | Phone       |
+-----------------------+------+-------------+
 PCP  | Unavailable |
+-----------------------+------+-------------+
 
 
 
 Encounter Details
 
 
+--------+-----------+----------------------+---------------------+-------------+
| Date   | Type      | Department           | Care Team           | Description |
+--------+-----------+----------------------+---------------------+-------------+
| / | [facility]  |   [facility] |   Guillaume Arzate  |             |
|    | Encounter |  MED CTR SLEEP       | MD Jean Pierre  [address]   |             |
|        |           | [address] [place] | [place]   [place]    |             |
|        |           |   [place]    | [place] [address]     |             |
|        |           | [address]           | [phone]        |             |
|        |           | [phone]         | [phone] (Fax)  |             |
+--------+-----------+----------------------+---------------------+-------------+
 
 
 
 Social History
 
 
+----------------+-------+-----------+--------+------+
| Tobacco Use    | Types | Packs/Day | Years  | Date |
|                |       |           | Used   |      |
+----------------+-------+-----------+--------+------+
| Never Assessed |       |           |        |      |
+----------------+-------+-----------+--------+------+
 
 
 
+------------------+---------------+
| Sex Assigned at  | Date Recorded |
| Birth            |               |
+------------------+---------------+
| Not on file      |               |
+------------------+---------------+
 
 
 
+----------------+-------------+-------------+
| Job Start Date | Occupation  | Industry    |
+----------------+-------------+-------------+
| Not on file    | Not on file | Not on file |
+----------------+-------------+-------------+
 
 
 
+----------------+--------------+------------+
| Travel History | Travel Start | Travel End |
+----------------+--------------+------------+
 
 
 
 
+-------------------------------------+
| No recent travel history available. |
+-------------------------------------+
 documented as of this encounter
 
 Plan of Treatment
 
 
+--------+-----------+------------+----------------------+-------------------+
| Date   | Type      | Specialty  | Care Team            | Description       |
+--------+-----------+------------+----------------------+-------------------+
| / | Office    | Cardiology |   Tonia Wilson,    |                   |
|    | Visit     |            | [address]   |                   |
|        |           |            | St  [place], [place]  |                   |
|        |           |            | [address]  [phone]  |                   |
|        |           |            |  [phone] (Fax)  |                   |
+--------+-----------+------------+----------------------+-------------------+
| / | Hospital  | Radiology  |   Popeye Townsend, |                   |
|    | Encounter |            |  MD  [address] [place] |                   |
|        |           |            |  [place],   |                   |
|        |           |            | [place] [address]             |                   |
|        |           |            | [phone]         |                   |
|        |           |            | [phone] (Fax)   |                   |
+--------+-----------+------------+----------------------+-------------------+
| / | Surgery   | Radiology  |   Popeye Townsend, | CV EP PPM SYSTEM  |
|    |           |            |  MD  [address] | IMPLANT           |
|        |           |            |  St.  [place],   |                   |
|        |           |            | [place] [address]             |                   |
|        |           |            | [phone]         |                   |
|        |           |            | [phone] (Fax)   |                   |
+--------+-----------+------------+----------------------+-------------------+
| 02/10/ | Clinical  | Cardiology |                      |                   |
|    | Support   |            |                      |                   |
+--------+-----------+------------+----------------------+-------------------+
| / | Office    | Cardiology |   Tonia Wilson,    |                   |
|    | Visit     |            | BELKIS  [address]   |                   |
|        |           |            | [place]  |                   |
|        |           |            | [address]  [phone]  |                   |
|        |           |            |  [phone] (Fax)  |                   |
+--------+-----------+------------+----------------------+-------------------+
| / | Off-Site  | Nephrology |   Marzena Moser  |                   |
|    | Visit     |            | DO ETIENNE  [address] [place] [address]      |                   |
|        |           |            | [address]      |                   |
|        |           |            | [place]      |                   |
|        |           |            | [address] [phone]  |                   |
|        |           |            |  [phone] (Fax)  |                   |
+--------+-----------+------------+----------------------+-------------------+
 documented as of this encounter
 
 Visit Diagnoses
 Not on filedocumented in this encounter

## 2020-01-08 NOTE — XMS
Encounter Summary
  Created on: 2020
 
 Viviana Ricci
 External Reference #: [id number]
 : 47
 Sex: Female
 
 Demographics
 
 
+-----------------------+----------------------+
| Address               | [address]      |
|                       | [place]  [address] |
+-----------------------+----------------------+
| Home Phone            | [phone]      |
+-----------------------+----------------------+
| Preferred Language    | Unknown              |
+-----------------------+----------------------+
| Marital Status        | Single               |
+-----------------------+----------------------+
| Voodoo Affiliation | [id number]                 |
+-----------------------+----------------------+
| Race                  | Unknown              |
+-----------------------+----------------------+
| Ethnic Group          | Unknown              |
+-----------------------+----------------------+
 
 
 Author
 
 
+--------------+--------------------------------------------+
| Author       | [facility]  |
|              | and [facility]                                |
+--------------+--------------------------------------------+
| Organization | [facility]  |
|              | and [facility]                                |
+--------------+--------------------------------------------+
| Address      | Unknown                                    |
+--------------+--------------------------------------------+
| Phone        | Unavailable                                |
+--------------+--------------------------------------------+
 
 
 
 Support
 
 
+----------------+--------------+---------------------+-----------------+
| Name           | Relationship | Address             | Phone           |
+----------------+--------------+---------------------+-----------------+
| Ada/Ed Radhames | ECON         | [address]              | [phone] |
|                |              | [place]  |                 |
|                |              |  [address]              |                 |
+----------------+--------------+---------------------+-----------------+
 
 
 
 
 Care Team Providers
 
 
+------------------------+------+-----------------+
| Care Team Member Name  | Role | Phone           |
+------------------------+------+-----------------+
| Marzena Moser DO | PCP  | [phone] |
+------------------------+------+-----------------+
 
 
 
 Reason for Visit
 Evaluate & Treat (Routine)
 
+------------+--------+------------+--------------+--------------+---------------+
| Status     | Reason | Specialty  | Diagnoses /  | Referred By  | Referred To   |
|            |        |            | Procedures   | Contact      | Contact       |
+------------+--------+------------+--------------+--------------+---------------+
| Authorized |        | Nephrology |   Diagnoses  |   Shanti  |   Stroemel,   |
|            |        |            |  Unspecified | Marzena M,   | Marzena M, DO |
|            |        |            |              | DO  [address] |   [address]    |
|            |        |            | complication |  [address] | [address]   |
|            |        |            |  of kidney   |  [address]  [place]  | [address]  [place]    |
|            |        |            | transplant   | [place]    | [place]     |
|            |        |            | FSGS (focal  | [address]        | [address]  Phone: |
|            |        |            | segmental    | Phone:       |  [phone] |
|            |        |            | glomeruloscl | [phone] |   Fax:        |
|            |        |            | erosis)      |   Fax:       | [phone]  |
|            |        |            | Hypertension | [phone] |               |
|            |        |            | , essential  |              |               |
|            |        |            |  Procedures  |              |               |
|            |        |            |  [facility]   |              |               |
|            |        |            | OUTPATIENT   |              |               |
|            |        |            | VISIT 25     |              |               |
|            |        |            | MINUTES      |              |               |
+------------+--------+------------+--------------+--------------+---------------+
 
 
 
 
 Encounter Details
 
 
+--------+---------+---------------------+----------------------+----------------------+
| Date   | Type    | Department          | Care Team            | Description          |
+--------+---------+---------------------+----------------------+----------------------+
| 12/23/ | Office  |   [facility]         |   Marzena Moser  | Kidney replaced by   |
| 2019   | Visit   | NEPHROLOGY  [address]   | M, DO  [address]      | transplant (Primary  |
|        |         | [address]   | [address]      | Dx); Hypertension,   |
|        |         | [place], [place]     | [place]      | essential;           |
|        |         | [address]          | [address]  [phone]  | Persistent atrial    |
|        |         | [phone]        |  [phone] (Fax)  | fibrillation; Type 2 |
|        |         |                     |                      |  DM with CKD stage 2 |
|        |         |                     |                      |  and hypertension    |
|        |         |                     |                      | ([facility])                |
+--------+---------+---------------------+----------------------+----------------------+
 
 
 
 
 Social History
 
 
+--------------+-------+-----------+--------+------+
| Tobacco Use  | Types | Packs/Day | Years  | Date |
|              |       |           | Used   |      |
+--------------+-------+-----------+--------+------+
| Never Smoker |       |           |        |      |
+--------------+-------+-----------+--------+------+
 
 
 
+---------------------+---+---+---+
| Smokeless Tobacco:  |   |   |   |
| Never Used          |   |   |   |
+---------------------+---+---+---+
 
 
 
+---------------------------------------------------------------+
| Comments: some second hand smoke exposure, but fairly minimal |
+---------------------------------------------------------------+
 
 
 
+-------------+-------------+---------+----------+
| Alcohol Use | Drinks/Week | oz/Week | Comments |
+-------------+-------------+---------+----------+
| No          |             |         |          |
+-------------+-------------+---------+----------+
 
 
 
+------------------+---------------+
| Sex Assigned at  | Date Recorded |
| Birth            |               |
+------------------+---------------+
| Not on file      |               |
+------------------+---------------+
 
 
 
+----------------+-------------+-------------+
| Job Start Date | Occupation  | Industry    |
+----------------+-------------+-------------+
| Not on file    | Not on file | Not on file |
+----------------+-------------+-------------+
 
 
 
+----------------+--------------+------------+
| Travel History | Travel Start | Travel End |
+----------------+--------------+------------+
 
 
 
+-------------------------------------+
| No recent travel history available. |
+-------------------------------------+
 documented as of this encounter
 
 
 Last Filed Vital Signs
 
 
+-------------------+-------------------+----------------------+----------+
| Vital Sign        | Reading           | Time Taken           | Comments |
+-------------------+-------------------+----------------------+----------+
| Blood Pressure    | 150/80            | 2019  3:00 PM  |          |
|                   |                   | PST                  |          |
+-------------------+-------------------+----------------------+----------+
| Pulse             | 68                | 2019  3:00 PM  | apical   |
|                   |                   | PST                  |          |
+-------------------+-------------------+----------------------+----------+
| Temperature       | 35.6   C (96   F) | 2019  3:00 PM  |          |
|                   |                   | PST                  |          |
+-------------------+-------------------+----------------------+----------+
| Respiratory Rate  | -                 | -                    |          |
+-------------------+-------------------+----------------------+----------+
| Oxygen Saturation | -                 | -                    |          |
+-------------------+-------------------+----------------------+----------+
| Inhaled Oxygen    | -                 | -                    |          |
| Concentration     |                   |                      |          |
+-------------------+-------------------+----------------------+----------+
| Weight            | 115.7 kg (255 lb) | 2019  3:00 PM  |          |
|                   |                   | PST                  |          |
+-------------------+-------------------+----------------------+----------+
| Height            | -                 | -                    |          |
+-------------------+-------------------+----------------------+----------+
| Body Mass Index   | 41.16             | 2019 11:44 AM  |          |
|                   |                   | PST                  |          |
+-------------------+-------------------+----------------------+----------+
 documented in this encounter
 
 Progress Notes
 Marzena Moser DO - 2019  2:00 PM PSTFormatting of this note might be different
 from the original.
 Subjective: NEPHROLOGY  
  
 Patient ID: Viviana Ricci is a 74 y.o. female.
 
 HPI Comments: 
  73 YOWF   who is s/p a renal allograft, 05, at [facility] with remote allograft dysfunction
 secondary to FSGS, with longstanding Type II   DM, hypertension, hypothyroidism, hyperlipid
emia, SHPTH,  previous low back pain, morbid obesity/JACK, chronic pulmonary  hypertension, h
istorically related to centripetal obesity, and  CAD, s/p CABG x3 vessels,. 
 
 She appears to be very consistent with her meds.  Apparently she had a detailed cardiology 
work-up for her bradycardia.  A 14-day monitor study showed symptomatic bradycardia, new ons
et A. fib, and apparently she is scheduled for an elective pacemaker in the near future.  Du
e to her risk for embolic CVA she was appropriately started on apixaban.  Additionally, an e
chocardiogram was done 10/31/2019, which showed her LVEF =55-60% mild aortic valve sclerosis
 without AS, mild TR, mild MR
 
 MEDS:
 Prograf1 mg BID.
 Rhcnxdhfcocor098 mg, BID.
 Prednisone 5 mg, daily.
 Outpatient Medications Marked as Taking for the 19 encounter (Office Visit) with Rosa Moser, DO 
 Medication Sig Dispense Refill 
 
   allopurinol (ZYLOPRIM) 100 mg tablet take 1 tablet by mouth once daily 30 tablet 11 
   apixaban (ELIQUIS) 5 mg tablet Take 1 tablet by mouth 2 times daily. 180 tablet 3 
   aspirin 81 mg EC tablet Take 81 mg by mouth Daily.   
   B-D INS SYRINGE 0.5CC/31GX5/16 31G X 5/16" 0.5 ML MISC USE BEFORE MEALS AS DIRECTED 1 e
ach 11 
   cholecalciferol (VITAMIN D-3) 2000 UNITS TABS Take 2,000 Units by mouth Every other day
.   
   cinacalcet (SENSIPAR) 30 mg tablet take 1 tablet by mouth once daily 90 tablet 3 
   fludrocortisone (FLORINEF) 0.1 mg tablet Take 1 tablet by mouth Every other day. 45 tab
let 3 
   furosemide (LASIX) 40 mg tablet Take 1 tablet by mouth Daily as needed. 30 tablet 11 
   glucose blood VI test strips (ONE TOUCH ULTRA TEST) strip Check blood sugar before each
 meal and as directed 100 each 12 
   insulin glargine (LANTUS) 100 units/mL injection (vial) inject 20 units subcutaneously 
every morning 10 vial 11 
   insulin lispro (HUMALOG) 100 units/mL injection (vial) inject subcutaneously BEFORE CHIKA
LS ACCORDING TO SLIDING SCALE Max dose 20 units daily (Patient taking differently: inject henry
bcutaneously BEFORE MEALS ACCORDING TO SLIDING SCALE Max dose 8 units daily) 10 vial 11 
   levothyroxine (SYNTHROID) 50 mcg tablet take 1 tablet by mouth once daily 30 tablet 11 
   lisinopril (PRINIVIL,ZESTRIL) 30 MG tablet take 1 tablet by mouth once daily 90 tablet 
3 
   loperamide (ANTI-DIARRHEAL) 2 mg capsule Take 1 capsule by mouth 4 times daily as neede
d. 60 capsule 5 
   losartan (COZAAR) 50 mg tablet take 1 tablet by mouth once daily 90 tablet 3 
   magnesium oxide (MAG-OX) 400 mg tablet take 1 tablet by mouth twice a day 60 tablet 11 
   Prenatal Vit-Fe Fumarate-FA (PNV PRENATAL PLUS MULTIVITAMIN) 27-1 MG TABS take 1 tablet
 by mouth once daily. 30 tablet 11 
   Respiratory Therapy Supplies MISC Continue nocturnal O2 at 2 l/m through CPAP for lifet
bart. Dx: JACK G47.33 Please provide new nasal mask for CPAP and any other needed replacement 
supplies. 1 each 0 
   Respiratory Therapy Supplies MISC Decrease CPAP to 12-18 cmH2O Diagnosis Code(s)327.23.
 Please send order to [facility]. 1 each 0 
   rosuvastatin (CRESTOR) 20 mg tablet take 1 tablet by mouth NIGHTLY 30 tablet 11 
  
 
 Allergies 
 Allergen Reactions 
   Tape [Adhesive & Tape] Rash 
  
 
 Objective: /80  | Pulse 68 Comment: apical | Temp 35.6 C (96 F)  | Wt 115.7 kg (2
55 lb)  | BMI 41.16 kg/m   
 
 Physical Exam
 HEENT:  no thrush.
 Heart:   irregularly, irregular with grade 1-2/6 MAYA heard best at LUSB, no S3 or rub.
 Lungs:  CTA bilaterally.  No rales or wheezes.
 Abdomen:  soft, obese, renal allograft RLQ is nontender,  NABS.
 Extremities: 1+ no edema,  no clubbing, or cyanosis.  
 
 Lab Results 
 Component Value Date 
  NAEX 143 2019 
  KEX 4.7 2019 
  CLEX 111 2019 
  CO2EX 21 2019 
  BUNEX 33 (A) 2019 
  CREEX 1.29 (A) 2019 
  EGFREX 41 2019 
  GLUEX 94 2019 
 
  PHOSEX 2.9 2019 
  MGEX 2 2019 
  PTHEX 193.0 (A) 2016 
  MYS0OXL 6.4 2019 
 
 Lab Results 
 Component Value Date 
  WBCEX 4.8 2019 
  HGBEX 13 2019 
  HCTEX 40.3 2019 
  PLTEX 177 2019 
 
 Lab Results 
 Component Value Date 
  TACROLIMUSEX 5.5 2019 
 
 Urine Pro/Cr ratio = not available.
 
 Assessment: 
 
 1.   Renal Allograft, 05--her Scr is stable.
 2.  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
 3.   Persistent AF--stable on rate control, apixaban.
 4.  Type 2 DM, requiring insuline--excellent control.
 5.  Hyperlipidemia--on statin therapy.
 6.   Hypertension-- excellent control.
 7.  SHPTH--serum Ca++ is slightly increased?
 8.  Morbid Obesity/JACK/Pulmonary hypertension--her weight is slowly improving over time.
 9.  CAD, s/p CABG  3 vessels, --stable.
 10.  Type IV RTA--compensated.
 11.  Chronic Low Back pain--in remission.
 12. chronic DJD, left knee--appears to progress over time but manageable.
 
 Plan: 
 
 1.   I reviewed with Viviana her lab, Scr, and tacrolimus level.  The tacro level appears stab
le on her current dose.
 2.  Need to ensure that the urine Pro/Cr ratio is included on her standing order.
 3.  I encouraged Viviana that she appears to be doing very well with her medication management
.
 4.  Also, I did discuss with Viviana that if she is having symptomatic bradycardia, then impla
ntation of an appropriate pacemaker might give her more energy overall?
 5.  Will plan to see her back in 6  months at the [facility] at [place], [place].  She
 will have her Standing Order, drug level, HbA1c, lipid profile, TSH and  Urine Pro/Cr ratio
 done one week prior to that.
 
 Electronically signed by Marzena Moser DO. 19 8:43 AM 
 
 CC:   Jose David Dalal M.D., [facility], [facility]
           Tonia TAMAYO
 Electronically signed by Marzena Moser DO at 2019  3:31 PM PSTdocumented in thi
s encounter
 
 Plan of Treatment
 
 
+--------+-----------+------------+----------------------+-------------------+
| Date   | Type      | Specialty  | Care Team            | Description       |
+--------+-----------+------------+----------------------+-------------------+
| / | Office    | Cardiology |   Tonia Wilson,    |                   |
 
|    | Visit     |            | ARNP  [address]   |                   |
|        |           |            | St  [facility] [place]  |                   |
|        |           |            | [address]  [phone]  |                   |
|        |           |            |  [phone] (Fax)  |                   |
+--------+-----------+------------+----------------------+-------------------+
| / | Hospital  | Radiology  |   Popeye Townsend, |                   |
|    | Encounter |            |  MD  [address] |                   |
|        |           |            |  St.  [place],   |                   |
|        |           |            | [place] [address]             |                   |
|        |           |            | [phone]         |                   |
|        |           |            | [phone] (Fax)   |                   |
+--------+-----------+------------+----------------------+-------------------+
| / | Surgery   | Radiology  |   Popeye Townsend, | CV EP PPM SYSTEM  |
|    |           |            |  MD  [address] | IMPLANT           |
|        |           |            |  St.  [place],   |                   |
|        |           |            | [place] [address]             |                   |
|        |           |            | [phone]         |                   |
|        |           |            | [phone] (Fax)   |                   |
+--------+-----------+------------+----------------------+-------------------+
| 02/10/ | Clinical  | Cardiology |                      |                   |
|    | Support   |            |                      |                   |
+--------+-----------+------------+----------------------+-------------------+
| / | Office    | Cardiology |   RakeshTonia andre,    |                   |
|    | Visit     |            | POP  [address]   |                   |
|        |           |            | [place]  |                   |
|        |           |            | [id number] [phone]  |                   |
|        |           |            |  [phone] (Fax)  |                   |
+--------+-----------+------------+----------------------+-------------------+
| / | Off-Site  | Nephrology |   Marzena Moser  |                   |
2020   | Visit     |            | M, DO  [address]      |                   |
|        |           |            | [address]      |                   |
|        |           |            | [place]      |                   |
|        |           |            | [id number] [phone]  |                   |
|        |           |            |  [phone] (Fax)  |                   |
+--------+-----------+------------+----------------------+-------------------+
 documented as of this encounter
 
 Procedures
 
 
+----------------------+--------+-------------+----------------------+----------------------
+
| Procedure Name       | Priori | Date/Time   | Associated Diagnosis | Comments             
|
|                      | ty     |             |                      |                      
|
+----------------------+--------+-------------+----------------------+----------------------
+
| EXTERNAL LAB:        | Routin | 2019  |                      |   Results for this   
|
| HEMOGLOBIN A1C       | e      |             |                      | procedure are in the 
|
|                      |        |             |                      |  results section.    
|
+----------------------+--------+-------------+----------------------+----------------------
+
| LABS - EXTERNAL SCAN |        | 2019  |                      |   Results for this   
|
|                      |        | 12:00 AM    |                      | procedure are in the 
|
 
|                      |        | PST         |                      |  results section.    
|
+----------------------+--------+-------------+----------------------+----------------------
+
 documented in this encounter
 
 Results
 LABS - EXTERNAL SCAN (2019 12:00 AM PST)
 
+------------------------------------------------------------------------+--------------+
| Narrative                                                              | Performed At |
+------------------------------------------------------------------------+--------------+
|   This result has an attachment that is not available.  Ordered by an  |              |
| unspecified provider.                                                  |              |
+------------------------------------------------------------------------+--------------+
 External Lab: Hemoglobin A1c (2019)
 
+-------------+-------+-----------+------------+--------------+
| Component   | Value | Ref Range | Performed  | Pathologist  |
|             |       |           | At         | Signature    |
+-------------+-------+-----------+------------+--------------+
| Hemoglobin  | 6.4   | %         |            |              |
| A1c,        |       |           |            |              |
| external    |       |           |            |              |
+-------------+-------+-----------+------------+--------------+
 
 
 
+----------+
| Specimen |
+----------+
| Blood    |
+----------+
 documented in this encounter
 
 Visit Diagnoses
 
 
+---------------------------------------------------------------+
| Diagnosis                                                     |
+---------------------------------------------------------------+
|   Kidney replaced by transplant - Primary                     |
+---------------------------------------------------------------+
|   Hypertension, essential  Unspecified essential hypertension |
+---------------------------------------------------------------+
|   Persistent atrial fibrillation  Atrial fibrillation         |
+---------------------------------------------------------------+
|   Type 2 DM with CKD stage 2 and hypertension (HCC)           |
+---------------------------------------------------------------+
 documented in this encounter

## 2020-01-08 NOTE — XMS
Encounter Summary
  Created on: 2020
 
 Viviana Ricci
 External Reference #: [id number]
 : 47
 Sex: Female
 
 Demographics
 
 
+-----------------------+----------------------+
| Address               | [address]      |
|                       | [place]  [address] |
+-----------------------+----------------------+
| Home Phone            | [phone]      |
+-----------------------+----------------------+
| Preferred Language    | Unknown              |
+-----------------------+----------------------+
| Marital Status        | Single               |
+-----------------------+----------------------+
| Restorationism Affiliation | [id number]                 |
+-----------------------+----------------------+
| Race                  | Unknown              |
+-----------------------+----------------------+
| Ethnic Group          | Unknown              |
+-----------------------+----------------------+
 
 
 Author
 
 
+--------------+--------------------------------------------+
| Author       | [facility]  |
|              | and [facility]                                |
+--------------+--------------------------------------------+
| Organization | [facility]  |
|              | and [facility]                                |
+--------------+--------------------------------------------+
| Address      | Unknown                                    |
+--------------+--------------------------------------------+
| Phone        | Unavailable                                |
+--------------+--------------------------------------------+
 
 
 
 Support
 
 
+----------------+--------------+---------------------+-----------------+
| Name           | Relationship | Address             | Phone           |
+----------------+--------------+---------------------+-----------------+
| Ada/Ed Radhames | ECON         | [address]              | [phone] |
|                |              | [place]  |                 |
|                |              |  [address]              |                 |
+----------------+--------------+---------------------+-----------------+
 
 
 
 
 Care Team Providers
 
 
+-----------------------+------+-------------+
| Care Team Member Name | Role | Phone       |
+-----------------------+------+-------------+
 PCP  | Unavailable |
+-----------------------+------+-------------+
 
 
 
 Reason for Visit
 
 
+-------------------+----------+
| Reason            | Comments |
+-------------------+----------+
| Medication Refill |          |
+-------------------+----------+
 
 
 
 Encounter Details
 
 
+--------+--------+---------------------+----------------------+-------------------+
| Date   | Type   | Department          | Care Team            | Description       |
+--------+--------+---------------------+----------------------+-------------------+
| / | Refill |   [facility]         |   Marzena Moser  | Medication Refill |
|    |        | NEPHROLOGY  [address]   | M, DO  [address]      |                   |
|        |        | [address]   | [address]      |                   |
|        |        | [place], [place]     | [place], [place]      |                   |
|        |        | [address]          | [address]  [phone]  |                   |
|        |        | [phone]        |  [phone] (Fax)  |                   |
+--------+--------+---------------------+----------------------+-------------------+
 
 
 
 Social History
 
 
+--------------+-------+-----------+--------+------+
| Tobacco Use  | Types | Packs/Day | Years  | Date |
|              |       |           | Used   |      |
+--------------+-------+-----------+--------+------+
| Never Smoker |       |           |        |      |
+--------------+-------+-----------+--------+------+
 
 
 
+---------------------+---+---+---+
| Smokeless Tobacco:  |   |   |   |
| Never Used          |   |   |   |
+---------------------+---+---+---+
 
 
 
+-------------+-------------+---------+----------+
| Alcohol Use | Drinks/Week | oz/Week | Comments |
 
+-------------+-------------+---------+----------+
| No          |             |         |          |
+-------------+-------------+---------+----------+
 
 
 
+------------------+---------------+
| Sex Assigned at  | Date Recorded |
| Birth            |               |
+------------------+---------------+
| Not on file      |               |
+------------------+---------------+
 
 
 
+----------------+-------------+-------------+
| Job Start Date | Occupation  | Industry    |
+----------------+-------------+-------------+
| Not on file    | Not on file | Not on file |
+----------------+-------------+-------------+
 
 
 
+----------------+--------------+------------+
| Travel History | Travel Start | Travel End |
+----------------+--------------+------------+
 
 
 
+-------------------------------------+
| No recent travel history available. |
+-------------------------------------+
 documented as of this encounter
 
 Plan of Treatment
 
 
+--------+-----------+------------+----------------------+-------------------+
| Date   | Type      | Specialty  | Care Team            | Description       |
+--------+-----------+------------+----------------------+-------------------+
| / | Office    | Cardiology |   Tonia Wilson,    |                   |
|    | Visit     |            | BELKIS  [address]   |                   |
|        |           |            | St  [place], [place]  |                   |
|        |           |            | [id number] [phone]  |                   |
|        |           |            |  [phone] (Fax)  |                   |
+--------+-----------+------------+----------------------+-------------------+
| / | Hospital  | Radiology  |   Cary Himanshuraul, |                   |
|    | Encounter |            |  MD  [address] |                   |
|        |           |            |  St.  [place],   |                   |
|        |           |            | [place] [address]             |                   |
|        |           |            | [phone]         |                   |
|        |           |            | [phone] (Fax)   |                   |
+--------+-----------+------------+----------------------+-------------------+
| / | Surgery   | Radiology  |   Aimeechidi Yinpeeraul, | CV EP PPM SYSTEM  |
|    |           |            |  MD  [address] | IMPLANT           |
|        |           |            |  St.  [place],   |                   |
|        |           |            | [place] [address]             |                   |
|        |           |            | [phone]         |                   |
|        |           |            | [phone] (Fax)   |                   |
+--------+-----------+------------+----------------------+-------------------+
 
| 02/10/ | Clinical  | Cardiology |                      |                   |
|    | Support   |            |                      |                   |
+--------+-----------+------------+----------------------+-------------------+
| / | Office    | Cardiology |   Tonia Wilson,    |                   |
|    | Visit     |            | ARNP  [address]   |                   |
|        |           |            | St  [place], [place]  |                   |
|        |           |            | [address]  [phone]  |                   |
|        |           |            |  [phone] (Fax)  |                   |
+--------+-----------+------------+----------------------+-------------------+
| / | Off-Site  | Nephrology |   Marzena Moser  |                   |
|    | Visit     |            | DO ETIENNE  [address] [place] [address]      |                   |
|        |           |            | [address]      |                   |
|        |           |            | [place]      |                   |
|        |           |            | [address] [phone]  |                   |
|        |           |            |  [phone] (Fax)  |                   |
+--------+-----------+------------+----------------------+-------------------+
 documented as of this encounter
 
 Visit Diagnoses
 Not on filedocumented in this encounter

## 2020-01-08 NOTE — XMS
Encounter Summary
  Created on: 2020
 
 Viviana Ricci
 External Reference #: [id number]
 : 47
 Sex: Female
 
 Demographics
 
 
+-----------------------+----------------------+
| Address               | [address]      |
|                       | [place]  [address] |
+-----------------------+----------------------+
| Home Phone            | [phone]      |
+-----------------------+----------------------+
| Preferred Language    | Unknown              |
+-----------------------+----------------------+
| Marital Status        | Single               |
+-----------------------+----------------------+
| Congregational Affiliation | [id number]                 |
+-----------------------+----------------------+
| Race                  | Unknown              |
+-----------------------+----------------------+
| Ethnic Group          | Unknown              |
+-----------------------+----------------------+
 
 
 Author
 
 
+--------------+--------------------------------------------+
| Author       | [facility]  |
|              | and [facility]                                |
+--------------+--------------------------------------------+
| Organization | [facility]  |
|              | and [facility]                                |
+--------------+--------------------------------------------+
| Address      | Unknown                                    |
+--------------+--------------------------------------------+
| Phone        | Unavailable                                |
+--------------+--------------------------------------------+
 
 
 
 Support
 
 
+----------------+--------------+---------------------+-----------------+
| Name           | Relationship | Address             | Phone           |
+----------------+--------------+---------------------+-----------------+
| Ada/Ed Radhames | ECON         | [address]              | [phone] |
|                |              | [place]  |                 |
|                |              |  [address]              |                 |
+----------------+--------------+---------------------+-----------------+
 
 
 
 
 Care Team Providers
 
 
+------------------------+------+-----------------+
| Care Team Member Name  | Role | Phone           |
+------------------------+------+-----------------+
| Marzena Moser DO | PCP  | [phone] |
+------------------------+------+-----------------+
 
 
 
 Encounter Details
 
 
+--------+-----------+----------------------+----------------------+----------------------+
| Date   | Type      | Department           | Care Team            | Description          |
+--------+-----------+----------------------+----------------------+----------------------+
| 10/16/ | Hospital  |   [facility] |   Tonia Wilson,    | Coronary artery      |
| 2019   | Encounter |  [facility]     | ARNP  [address]   | disease involving    |
|        |           | MEDICINE  [address]      | [address]  [place], [place]  | native coronary      |
|        |           | [address]  [place], | [address]  [phone]  | artery of native     |
|        |           |  [place] [address]       |  [phone] (Fax)  | heart without angina |
|        |           | [phone]         |                      |  pectoris;           |
|        |           |                      |                      | Hypertension,        |
|        |           |                      |                      | essential; Mixed     |
|        |           |                      |                      | hyperlipidemia; PVC  |
|        |           |                      |                      | (premature           |
|        |           |                      |                      | ventricular          |
|        |           |                      |                      | contraction);        |
|        |           |                      |                      | Permanent atrial     |
|        |           |                      |                      | fibrillation         |
+--------+-----------+----------------------+----------------------+----------------------+
 
 
 
 Social History
 
 
+--------------+-------+-----------+--------+------+
| Tobacco Use  | Types | Packs/Day | Years  | Date |
|              |       |           | Used   |      |
+--------------+-------+-----------+--------+------+
| Never Smoker |       |           |        |      |
+--------------+-------+-----------+--------+------+
 
 
 
+---------------------+---+---+---+
| Smokeless Tobacco:  |   |   |   |
| Never Used          |   |   |   |
+---------------------+---+---+---+
 
 
 
+---------------------------------------------------------------+
| Comments: some second hand smoke exposure, but fairly minimal |
+---------------------------------------------------------------+
 
 
 
 
+-------------+-------------+---------+----------+
| Alcohol Use | Drinks/Week | oz/Week | Comments |
+-------------+-------------+---------+----------+
| No          |             |         |          |
+-------------+-------------+---------+----------+
 
 
 
+------------------+---------------+
| Sex Assigned at  | Date Recorded |
| Birth            |               |
+------------------+---------------+
| Not on file      |               |
+------------------+---------------+
 
 
 
+----------------+-------------+-------------+
| Job Start Date | Occupation  | Industry    |
+----------------+-------------+-------------+
| Not on file    | Not on file | Not on file |
+----------------+-------------+-------------+
 
 
 
+----------------+--------------+------------+
| Travel History | Travel Start | Travel End |
+----------------+--------------+------------+
 
 
 
+-------------------------------------+
| No recent travel history available. |
+-------------------------------------+
 documented as of this encounter
 
 Medications at Time of Discharge
 
 
+----------------------+----------------------+-----------+---------+----------+-----------+
| Medication           | Sig                  | Dispensed | Refills | Start    | End Date  |
|                      |                      |           |         | Date     |           |
+----------------------+----------------------+-----------+---------+----------+-----------+
|   allopurinol        | take 1 tablet by     |   30      | 11      | 20 |           |
| (ZYLOPRIM) 100 mg    | mouth once daily     | tablet    |         | 18       |           |
| tablet               |                      |           |         |          |           |
+----------------------+----------------------+-----------+---------+----------+-----------+
|   aspirin 81 mg EC   | Take 81 mg by mouth  |           | 0       |          |           |
| tablet               | Daily.               |           |         |          |           |
+----------------------+----------------------+-----------+---------+----------+-----------+
|   B-D INS SYRINGE    | USE BEFORE MEALS AS  |   1 each  | 11      | 20 |           |
| 0.5CC/31GX5/16 31G X | DIRECTED             |           |         | 18       |           |
|  516" 0.5 ML MISC   |                      |           |         |          |           |
+----------------------+----------------------+-----------+---------+----------+-----------+
|   cholecalciferol    | Take 2,000 Units by  |           | 0       |          |           |
| (VITAMIN D-3) 2000   | mouth Every other    |           |         |          |           |
| UNITS                | day.                 |           |         |          |           |
| TABSIndications:     |                      |           |         |          |           |
| Unspecified          |                      |           |         |          |           |
 
| hypertensive kidney  |                      |           |         |          |           |
| disease with chronic |                      |           |         |          |           |
|  kidney disease      |                      |           |         |          |           |
| stage I through      |                      |           |         |          |           |
| stage IV, or         |                      |           |         |          |           |
| unspecified(403.90), |                      |           |         |          |           |
|  FSGS (focal         |                      |           |         |          |           |
| segmental            |                      |           |         |          |           |
| glomerulosclerosis), |                      |           |         |          |           |
|  Hyperlipidemia,     |                      |           |         |          |           |
| Complications of     |                      |           |         |          |           |
| transplanted kidney  |                      |           |         |          |           |
+----------------------+----------------------+-----------+---------+----------+-----------+
|   cinacalcet         | take 1 tablet by     |   90      | 3       | 20 |           |
| (SENSIPAR) 30 mg     | mouth once daily     | tablet    |         | 19       |           |
| tablet               |                      |           |         |          |           |
+----------------------+----------------------+-----------+---------+----------+-----------+
|   cyclobenzaprine    | Take 1 tablet by     |   45      | 0       | 20 |           |
| (FLEXERIL) 10 mg     | mouth Twice  daily   | tablet    |         | 19       |           |
| tablet               | as needed for Muscle |           |         |          |           |
|                      |  spasms.             |           |         |          |           |
+----------------------+----------------------+-----------+---------+----------+-----------+
|   fludrocortisone    | Take 1 tablet by     |   45      | 3       | 20 |           |
| (FLORINEF) 0.1 mg    | mouth Every other    | tablet    |         | 19       |           |
| tablet               | day.                 |           |         |          |           |
+----------------------+----------------------+-----------+---------+----------+-----------+
|   furosemide (LASIX) | Take 1 tablet by     |   30      | 11      | 20 |           |
|  40 mg               | mouth Daily as       | tablet    |         | 18       |           |
| tabletIndications:   | needed.              |           |         |          |           |
| Edema, unspecified   |                      |           |         |          |           |
| type, FSGS (focal    |                      |           |         |          |           |
| segmental            |                      |           |         |          |           |
| glomerulosclerosis), |                      |           |         |          |           |
|  Hyperlipidemia      |                      |           |         |          |           |
+----------------------+----------------------+-----------+---------+----------+-----------+
|   glucose blood VI   | Check blood sugar    |   100     | 12      | 20 |           |
| test strips (ONE     | before each meal and | each      |         | 12       |           |
| TOUCH ULTRA TEST)    |  as directed         |           |         |          |           |
| stripIndications:    |                      |           |         |          |           |
| Unspecified          |                      |           |         |          |           |
| hypertensive kidney  |                      |           |         |          |           |
| disease with chronic |                      |           |         |          |           |
|  kidney disease      |                      |           |         |          |           |
| stage I through      |                      |           |         |          |           |
| stage IV, or         |                      |           |         |          |           |
| unspecified(403.90), |                      |           |         |          |           |
|  Complications of    |                      |           |         |          |           |
| transplanted kidney, |                      |           |         |          |           |
|  Diabetes mellitus   |                      |           |         |          |           |
| type II,             |                      |           |         |          |           |
| uncontrolled (HCC),  |                      |           |         |          |           |
| Hyperlipidemia       |                      |           |         |          |           |
+----------------------+----------------------+-----------+---------+----------+-----------+
|   insulin glargine   | inject 20 units      |   10 vial | 11      | 20 |           |
| (LANTUS) 100         | subcutaneously every |           |         | 18       |           |
| units/mL injection   |  morning             |           |         |          |           |
| (vial)Indications:   |                      |           |         |          |           |
| Type 2 diabetes      |                      |           |         |          |           |
| mellitus with        |                      |           |         |          |           |
| chronic kidney       |                      |           |         |          |           |
 
| disease, without     |                      |           |         |          |           |
| long-term current    |                      |           |         |          |           |
| use of insulin,      |                      |           |         |          |           |
| unspecified CKD      |                      |           |         |          |           |
| stage (HCC), Kidney  |                      |           |         |          |           |
| replaced by          |                      |           |         |          |           |
| transplant           |                      |           |         |          |           |
+----------------------+----------------------+-----------+---------+----------+-----------+
|   insulin lispro     | inject               |   10 vial | 11      | 11/15/20 |           |
| (HUMALOG) 100        | subcutaneously       |           |         | 17       |           |
| units/mL injection   | BEFORE MEALS         |           |         |          |           |
| (vial)Indications:   | ACCORDING TO SLIDING |           |         |          |           |
| Type 2 diabetes      |  SCALE Max dose 20   |           |         |          |           |
| mellitus with        | units daily          |           |         |          |           |
| complication, with   |                      |           |         |          |           |
| long-term current    |                      |           |         |          |           |
| use of insulin (HCC) |                      |           |         |          |           |
+----------------------+----------------------+-----------+---------+----------+-----------+
|   levothyroxine      | take 1 tablet by     |   30      | 11      | 20 |           |
| (SYNTHROID) 50 mcg   | mouth once daily     | tablet    |         | 18       |           |
| tablet               |                      |           |         |          |           |
+----------------------+----------------------+-----------+---------+----------+-----------+
|   lisinopril         | take 1 tablet by     |   90      | 3       | 20 |           |
| (PRINIVIL,ZESTRIL)   | mouth once daily     | tablet    |         | 17       |           |
| 30 MG tablet         |                      |           |         |          |           |
+----------------------+----------------------+-----------+---------+----------+-----------+
|   loperamide         | Take 1 capsule by    |   60      | 5       | 20 |           |
| (ANTI-DIARRHEAL) 2   | mouth 4 times daily  | capsule   |         | 14       |           |
| mg                   | as needed.           |           |         |          |           |
| capsuleIndications:  |                      |           |         |          |           |
| Unspecified          |                      |           |         |          |           |
| hypertensive kidney  |                      |           |         |          |           |
| disease with chronic |                      |           |         |          |           |
|  kidney disease      |                      |           |         |          |           |
| stage I through      |                      |           |         |          |           |
| stage IV, or         |                      |           |         |          |           |
| unspecified(403.90), |                      |           |         |          |           |
|  FSGS (focal         |                      |           |         |          |           |
| segmental            |                      |           |         |          |           |
| glomerulosclerosis), |                      |           |         |          |           |
|  Hypothyroidism,     |                      |           |         |          |           |
| Hyperlipidemia       |                      |           |         |          |           |
+----------------------+----------------------+-----------+---------+----------+-----------+
|   losartan (COZAAR)  | take 1 tablet by     |   90      | 3       | 20 |           |
| 50 mg tablet         | mouth once daily     | tablet    |         | 18       |           |
+----------------------+----------------------+-----------+---------+----------+-----------+
|   magnesium oxide    | take 1 tablet by     |   60      | 11      | 10/02/20 |           |
| (MAG-OX) 400 mg      | mouth twice a day    | tablet    |         | 18       |           |
| tablet               |                      |           |         |          |           |
+----------------------+----------------------+-----------+---------+----------+-----------+
|   predniSONE         | take 1 tablet by     |   90      | 3       | 10/02/20 |           |
| (DELTASONE) 5 mg     | mouth once daily     | tablet    |         | 18       |           |
| tablet               |                      |           |         |          |           |
+----------------------+----------------------+-----------+---------+----------+-----------+
|   Prenatal Vit-Fe    | take 1 tablet by     |   30      | 11      | 20 |           |
| Fumarate-FA (PNV     | mouth once daily.    | tablet    |         | 18       |           |
| PRENATAL PLUS        |                      |           |         |          |           |
| MULTIVITAMIN) 27-1   |                      |           |         |          |           |
| MG TABS              |                      |           |         |          |           |
+----------------------+----------------------+-----------+---------+----------+-----------+
 
|   Respiratory        | Continue nocturnal   |   1 each  | 0       | 20 |           |
| Therapy Supplies     | O2 at 2 l/m through  |           |         | 18       |           |
| MISCIndications: JACK | CPAP for lifetime.   |           |         |          |           |
|  (obstructive sleep  | Dx: JACK G47.33       |           |         |          |           |
| apnea)               | Please provide new   |           |         |          |           |
|                      | nasal mask for CPAP  |           |         |          |           |
|                      | and any other needed |           |         |          |           |
|                      |  replacement         |           |         |          |           |
|                      | supplies.            |           |         |          |           |
+----------------------+----------------------+-----------+---------+----------+-----------+
|   Respiratory        | Decrease CPAP to     |   1 each  | 0       | 20 |           |
| Therapy Supplies     | 12-18 cmH2O          |           |         | 13       |           |
| MISC                 | Diagnosis            |           |         |          |           |
|                      | Code(s)327.23.       |           |         |          |           |
|                      | Please send order to |           |         |          |           |
|                      |  [facility].     |           |         |          |           |
+----------------------+----------------------+-----------+---------+----------+-----------+
|   rosuvastatin       | take 1 tablet by     |   30      | 11      | 20 |           |
| (CRESTOR) 20 mg      | mouth NIGHTLY        | tablet    |         | 18       |           |
| tablet               |                      |           |         |          |           |
+----------------------+----------------------+-----------+---------+----------+-----------+
|   fludrocortisone    | Take 1 tablet by     |           | 0       |          |  |
| (FLORINEF) 0.1 mg    | mouth Every other    |           |         |          | 9         |
| tablet               | day.                 |           |         |          |           |
+----------------------+----------------------+-----------+---------+----------+-----------+
|   metoprolol         | take 1 tablet by     |   60      | 11      | 20 |  |
| tartrate (LOPRESSOR) | mouth twice a day    | tablet    |         | 19       | 9         |
|  25 mg tablet        |                      |           |         |          |           |
+----------------------+----------------------+-----------+---------+----------+-----------+
|   mycophenolate      | Take 1 capsule by    |   60      | 11      | 20 |  |
| (CELLCEPT) 250 mg    | mouth 2 times daily. | capsule   |         | 18       | 9         |
| capsuleIndications:  |                      |           |         |          |           |
| Kidney replaced by   |                      |           |         |          |           |
| transplant           |                      |           |         |          |           |
+----------------------+----------------------+-----------+---------+----------+-----------+
|   QVAR REDIHALER 80  |                      |           | 0       | 20 |  |
| MCG/ACT inhaler      |                      |           |         | 18       | 9         |
+----------------------+----------------------+-----------+---------+----------+-----------+
|   tacrolimus         | Take 1 capsule by    |   240     | 0       | 20 |  |
| (PROGRAF) 1 mg       | mouth 2 times daily. | capsule   |         | 19       | 9         |
| capsuleIndications:  |  For a total dose of |           |         |          |           |
| Kidney replaced by   |  1 mg, by mouth, 2   |           |         |          |           |
| transplant           | times daily.         |           |         |          |           |
+----------------------+----------------------+-----------+---------+----------+-----------+
 documented as of this encounter
 
 Plan of Treatment
 
 
+--------+-----------+------------+----------------------+-------------------+
| Date   | Type      | Specialty  | Care Team            | Description       |
+--------+-----------+------------+----------------------+-------------------+
| / | Office    | Cardiology |   Tonia Wilson,    |                   |
|    | Visit     |            | ARNP  [address]   |                   |
|        |           |            | [place]  |                   |
|        |           |            | [address] [phone]  |                   |
|        |           |            |  [phone] (Fax)  |                   |
+--------+-----------+------------+----------------------+-------------------+
| / | Hospital  | Radiology  |   Popeye Townsend, |                   |
|    | Encounter |            |  MD  [address] |                   |
 
|        |           |            |  [place]   |                   |
|        |           |            | [place] [address]             |                   |
|        |           |            | [phone]         |                   |
|        |           |            | [phone] (Fax)   |                   |
+--------+-----------+------------+----------------------+-------------------+
| / | Surgery   | Radiology  |   Irineoyinjonas Yinirineo, | CV EP PPM SYSTEM  |
|    |           |            |  MD  [address] | IMPLANT           |
|        |           |            |  [place]   |                   |
|        |           |            | [place] [address]             |                   |
|        |           |            | [phone]         |                   |
|        |           |            | [phone] (Fax)   |                   |
+--------+-----------+------------+----------------------+-------------------+
| 02/10/ | Clinical  | Cardiology |                      |                   |
|    | Support   |            |                      |                   |
+--------+-----------+------------+----------------------+-------------------+
| / | Office    | Cardiology |   Tonia Wilson,    |                   |
|    | Visit     |            | BELKIS  [address]   |                   |
|        |           |            | [place]  |                   |
|        |           |            | [address]  [phone]  |                   |
|        |           |            |  [phone] (Fax)  |                   |
+--------+-----------+------------+----------------------+-------------------+
| / | Off-Site  | Nephrology |   Marzena Moser  |                   |
| 2020   | Visit     |            | M, DO  [address]      |                   |
|        |           |            | [address]      |                   |
|        |           |            | [place]      |                   |
|        |           |            | [address]  [phone]  |                   |
|        |           |            |  [phone] (Fax)  |                   |
+--------+-----------+------------+----------------------+-------------------+
 documented as of this encounter
 
 Procedures
 
 
+-----------------+--------+-------------+----------------------+----------------------+
| Procedure Name  | Priori | Date/Time   | Associated Diagnosis | Comments             |
|                 | ty     |             |                      |                      |
+-----------------+--------+-------------+----------------------+----------------------+
| MOBILE CARDIAC  | Routin | 2019  |   Coronary artery    |   Results for this   |
| TELEMETRY (MCT) | e      |  1:58 PM    | disease involving    | procedure are in the |
|                 |        | PST         | native coronary      |  results section.    |
|                 |        |             | artery of native     |                      |
|                 |        |             | heart without angina |                      |
|                 |        |             |  pectoris            |                      |
|                 |        |             | Hypertension,        |                      |
|                 |        |             | essential  Mixed     |                      |
|                 |        |             | hyperlipidemia  PVC  |                      |
|                 |        |             | (premature           |                      |
|                 |        |             | ventricular          |                      |
|                 |        |             | contraction)         |                      |
+-----------------+--------+-------------+----------------------+----------------------+
 documented in this encounter
 
 Results
 Mobile Cardiac Telemetry (2019  1:58 PM PST)
 
+------------------------------------------------------------------------+--------------+
| Narrative                                                              | Performed At |
+------------------------------------------------------------------------+--------------+
|   Popeye Townsend MD       2019 14:04  Mobile cardiac telemetry |   [facility]  |
|  from 10/16 until 10/30/2019.  Baseline EKG show atrial fibrillation   |              |
 
| with heart rate ranging   from 33 to 149 bpm.  PVCs, ventricular       |              |
| couplets, 4 beat run nonsustained ventricular   tachycardia was noted. |              |
|      Episodes of rapid ventricular response with heart rate of149 bpm  |              |
|   was noted.  Pauses up to 3.8-second were present.  Findings suggest  |              |
| potential tacky/bradycardia syndrome.                                  |              |
+------------------------------------------------------------------------+--------------+
 
 
 
+--------------+---------+--------------------+--------------+
| Performing   | Address | City/State/Zipcode | Phone Number |
| Organization |         |                    |              |
+--------------+---------+--------------------+--------------+
|   [facility]  |         |                    |              |
+--------------+---------+--------------------+--------------+
 documented in this encounter
 
 Visit Diagnoses
 
 
+-------------------------------------------------------------------------------------+
| Diagnosis                                                                           |
+-------------------------------------------------------------------------------------+
|   Coronary artery disease involving native coronary artery of native heart without  |
| angina pectoris                                                                     |
+-------------------------------------------------------------------------------------+
|   Hypertension, essential  Unspecified essential hypertension                       |
+-------------------------------------------------------------------------------------+
|   Mixed hyperlipidemia                                                              |
+-------------------------------------------------------------------------------------+
|   PVC (premature ventricular contraction)  Other premature beats                    |
+-------------------------------------------------------------------------------------+
|   Permanent atrial fibrillation  Atrial fibrillation                                |
+-------------------------------------------------------------------------------------+
 documented in this encounter

## 2020-01-08 NOTE — XMS
Encounter Summary
  Created on: 2020
 
 Viviana Ricci
 External Reference #: [id number]
 : 47
 Sex: Female
 
 Demographics
 
 
+-----------------------+----------------------+
| Address               | [address]      |
|                       | [place]  [address] |
+-----------------------+----------------------+
| Home Phone            | [phone]      |
+-----------------------+----------------------+
| Preferred Language    | Unknown              |
+-----------------------+----------------------+
| Marital Status        | Single               |
+-----------------------+----------------------+
| Latter-day Affiliation | [id number]                 |
+-----------------------+----------------------+
| Race                  | Unknown              |
+-----------------------+----------------------+
| Ethnic Group          | Unknown              |
+-----------------------+----------------------+
 
 
 Author
 
 
+--------------+--------------------------------------------+
| Author       | [facility]  |
|              | and [facility]                                |
+--------------+--------------------------------------------+
| Organization | [facility]  |
|              | and [facility]                                |
+--------------+--------------------------------------------+
| Address      | Unknown                                    |
+--------------+--------------------------------------------+
| Phone        | Unavailable                                |
+--------------+--------------------------------------------+
 
 
 
 Support
 
 
+----------------+--------------+---------------------+-----------------+
| Name           | Relationship | Address             | Phone           |
+----------------+--------------+---------------------+-----------------+
| Ada/Ed Radhames | ECON         | [address]              | [phone] |
|                |              | [place]  |                 |
|                |              |  [address]              |                 |
+----------------+--------------+---------------------+-----------------+
 
 
 
 
 Care Team Providers
 
 
+-----------------------+------+-------------+
| Care Team Member Name | Role | Phone       |
+-----------------------+------+-------------+
 PCP  | Unavailable |
+-----------------------+------+-------------+
 
 
 
 Reason for Visit
 
 
+--------------------+----------+
| Reason             | Comments |
+--------------------+----------+
| Medication Problem |          |
+--------------------+----------+
 
 
 
 Encounter Details
 
 
+--------+--------+---------------------+----------------------+--------------------+
| Date   | Type   | Department          | Care Team            | Description        |
+--------+--------+---------------------+----------------------+--------------------+
| / | Refill |   [facility]         |   Marzena Moser  | Medication Problem |
|    |        | NEPHROLOGY  [address]   | M, DO  [address]      |                    |
|        |        | [address]   | [address]      |                    |
|        |        | [place], [place]     | [place], [place]      |                    |
|        |        | [address]          | [address]  [phone]  |                    |
|        |        | [phone]        |  [phone] (Fax)  |                    |
+--------+--------+---------------------+----------------------+--------------------+
 
 
 
 Social History
 
 
+--------------+-------+-----------+--------+------+
| Tobacco Use  | Types | Packs/Day | Years  | Date |
|              |       |           | Used   |      |
+--------------+-------+-----------+--------+------+
| Never Smoker |       |           |        |      |
+--------------+-------+-----------+--------+------+
 
 
 
+---------------------+---+---+---+
| Smokeless Tobacco:  |   |   |   |
| Never Used          |   |   |   |
+---------------------+---+---+---+
 
 
 
+---------------------------------------------------------------+
| Comments: some second hand smoke exposure, but fairly minimal |
 
+---------------------------------------------------------------+
 
 
 
+-------------+-------------+---------+----------+
| Alcohol Use | Drinks/Week | oz/Week | Comments |
+-------------+-------------+---------+----------+
| No          |             |         |          |
+-------------+-------------+---------+----------+
 
 
 
+------------------+---------------+
| Sex Assigned at  | Date Recorded |
| Birth            |               |
+------------------+---------------+
| Not on file      |               |
+------------------+---------------+
 
 
 
+----------------+-------------+-------------+
| Job Start Date | Occupation  | Industry    |
+----------------+-------------+-------------+
| Not on file    | Not on file | Not on file |
+----------------+-------------+-------------+
 
 
 
+----------------+--------------+------------+
| Travel History | Travel Start | Travel End |
+----------------+--------------+------------+
 
 
 
+-------------------------------------+
| No recent travel history available. |
+-------------------------------------+
 documented as of this encounter
 
 Plan of Treatment
 
 
+--------+-----------+------------+----------------------+-------------------+
| Date   | Type      | Specialty  | Care Team            | Description       |
+--------+-----------+------------+----------------------+-------------------+
| / | Office    | Cardiology |   Tonia Wilson,    |                   |
|    | Visit     |            | ARNJESSICA  [address]   |                   |
|        |           |            | St  [place], [place]  |                   |
|        |           |            | [address]  [phone]  |                   |
|        |           |            |  [phone] (Fax)  |                   |
+--------+-----------+------------+----------------------+-------------------+
| / | Hospital  | Radiology  |   Popeye Townsend, |                   |
|    | Encounter |            |  MD  [address] |                   |
|        |           |            |  St.  [place],   |                   |
|        |           |            | [place] [address]             |                   |
|        |           |            | [phone]         |                   |
|        |           |            | [phone] (Fax)   |                   |
+--------+-----------+------------+----------------------+-------------------+
| / | Surgery   | Radiology  |   Popeye Townsend, | CV EP PPM SYSTEM  |
 
|    |           |            |  MD  [address] | IMPLANT           |
|        |           |            |  St.  [place],   |                   |
|        |           |            | [place] [address]             |                   |
|        |           |            | [phone]         |                   |
|        |           |            | [phone] (Fax)   |                   |
+--------+-----------+------------+----------------------+-------------------+
| 02/10/ | Clinical  | Cardiology |                      |                   |
|    | Support   |            |                      |                   |
+--------+-----------+------------+----------------------+-------------------+
| / | Office    | Cardiology |   Tonia Wilson,    |                   |
|    | Visit     |            | ARNP  [address]   |                   |
|        |           |            | [place]  |                   |
|        |           |            | [address]  [phone]  |                   |
|        |           |            |  [phone] (Fax)  |                   |
+--------+-----------+------------+----------------------+-------------------+
| / | Off-Site  | Nephrology |   Marzena Moser  |                   |
|    | Visit     |            | M, DO  [address]      |                   |
|        |           |            | [address]      |                   |
|        |           |            | [place]      |                   |
|        |           |            | [address]  [phone]  |                   |
|        |           |            |  [phone] (Fax)  |                   |
+--------+-----------+------------+----------------------+-------------------+
 documented as of this encounter
 
 Visit Diagnoses
 Not on filedocumented in this encounter

## 2020-01-08 NOTE — XMS
Encounter Summary
  Created on: 2020
 
 Viviana Ricci
 External Reference #: [id number]
 : 47
 Sex: Female
 
 Demographics
 
 
+-----------------------+----------------------+
| Address               | [address]      |
|                       | [place]  [address] |
+-----------------------+----------------------+
| Home Phone            | [phone]      |
+-----------------------+----------------------+
| Preferred Language    | Unknown              |
+-----------------------+----------------------+
| Marital Status        | Single               |
+-----------------------+----------------------+
| Pentec[employer]al Affiliation | [id number]                 |
+-----------------------+----------------------+
| Race                  | Unknown              |
+-----------------------+----------------------+
| Ethnic Group          | Unknown              |
+-----------------------+----------------------+
 
 
 Author
 
 
+--------------+--------------------------------------------+
| Author       | [facility]  |
|              | and [facility]                                |
+--------------+--------------------------------------------+
| Organization | [facility]  |
|              | and [facility]                                |
+--------------+--------------------------------------------+
| Address      | Unknown                                    |
+--------------+--------------------------------------------+
| Phone        | Unavailable                                |
+--------------+--------------------------------------------+
 
 
 
 Support
 
 
+----------------+--------------+---------------------+-----------------+
| Name           | Relationship | Address             | Phone           |
+----------------+--------------+---------------------+-----------------+
| Ada/Ed Radhames | ECON         | [address]              | [phone] |
|                |              | [place]  |                 |
|                |              |  [id number]              |                 |
+----------------+--------------+---------------------+-----------------+
 
 
 
 
 Care Team Providers
 
 
+------------------------+------+-----------------+
| Care Team Member Name  | Role | Phone           |
+------------------------+------+-----------------+
| Marzena Moser PCP  | [phone] |
+------------------------+------+-----------------+
 
 
 
 Reason for Referral
 Diagnostic/Screening (Routine)
 
+--------+--------+-----------+--------------+--------------+---------------+
| Status | Reason | Specialty | Diagnoses /  | Referred By  | Referred To   |
|        |        |           | Procedures   | Contact      | Contact       |
+--------+--------+-----------+--------------+--------------+---------------+
| Closed |        | Radiology |   Diagnoses  |   [place],  |   [facility]    |
|        |        |           |  Coronary    | Tonia, ARNP   | [address]  |
|        |        |           | artery       | [address] |  [place], |
|        |        |           | disease      |  St  [place]   |  [place]           |
|        |        |           | involving    | [place]    | [address]    |
|        |        |           | native       | [address]        | Phone:        |
|        |        |           | coronary     | Phone:       | [phone]  |
|        |        |           | artery of    | [phone] |  Fax:         |
|        |        |           | native heart |   Fax:       | [phone]  |
|        |        |           |  without     | [phone] |               |
|        |        |           | angina       |              |               |
|        |        |           | pectoris     |              |               |
|        |        |           | Hypertension |              |               |
|        |        |           | , essential  |              |               |
|        |        |           |  Mixed       |              |               |
|        |        |           | hyperlipidem |              |               |
|        |        |           | ia  PVC      |              |               |
|        |        |           | (premature   |              |               |
|        |        |           | ventricular  |              |               |
|        |        |           | contraction) |              |               |
|        |        |           |   Procedures |              |               |
|        |        |           |   ECHO       |              |               |
|        |        |           | Complete     |              |               |
+--------+--------+-----------+--------------+--------------+---------------+
 
 
 Diagnostic/Screening (Routine)
 
+--------+--------+-----------+--------------+--------------+---------------+
| Status | Reason | Specialty | Diagnoses /  | Referred By  | Referred To   |
|        |        |           | Procedures   | Contact      | Contact       |
+--------+--------+-----------+--------------+--------------+---------------+
| Closed |        | Radiology |   Diagnoses  |   [place],  |   [facility] |
|        |        |           |  Coronary    | [facility]   |  Medicine     |
|        |        |           | artery       | [address] | [address]  |
|        |        |           | disease      |  St  [place]   |  [place], |
|        |        |           | involving    | [place]    |  [place]           |
|        |        |           | native       | [address]        | [address]    |
|        |        |           | coronary     | Phone:       | Phone:        |
|        |        |           | artery of    | [phone] | [phone]  |
|        |        |           | native heart |   Fax:       |  Fax:         |
 
|        |        |           |  without     | [phone] | [phone]  |
|        |        |           | angina       |              |               |
|        |        |           | pectoris     |              |               |
|        |        |           | Hypertension |              |               |
|        |        |           | , essential  |              |               |
|        |        |           |  Mixed       |              |               |
|        |        |           | hyperlipidem |              |               |
|        |        |           | ia           |              |               |
|        |        |           | Procedures   |              |               |
|        |        |           | NM Nuclear   |              |               |
|        |        |           | Stress Test  |              |               |
|        |        |           | (Vasodilator |              |               |
|        |        |           | )            |              |               |
+--------+--------+-----------+--------------+--------------+---------------+
 
 
 
 
 Reason for Visit
 
 
+------------------+----------+
| Reason           | Comments |
+------------------+----------+
| Follow-up        |          |
+------------------+----------+
| Coronary Artery  |          |
| Disease          |          |
+------------------+----------+
| Hypertension     |          |
+------------------+----------+
| Hyperlipidemia   |          |
+------------------+----------+
 
 
 
 Encounter Details
 
 
+--------+---------+----------------------+----------------------+----------------------+
| Date   | Type    | Department           | Care Team            | Description          |
+--------+---------+----------------------+----------------------+----------------------+
| 10/10/ | Office  |   [facility]          |   Tonia Wilson,    | Coronary artery      |
| 2019   | Visit   | CARDIOLOGY  [address]    | ARNP  [address]   | disease involving    |
|        |         | [address]  [place], | St  [place] [facility], [place]  | native coronary      |
|        |         |  [place] [address]       | [address]  [phone]  | artery of native     |
|        |         | [phone]         |  [phone] (Fax)  | heart without angina |
|        |         |                      |                      |  pectoris (Primary   |
|        |         |                      |                      | Dx); Hypertension,   |
|        |         |                      |                      | essential; Mixed     |
|        |         |                      |                      | hyperlipidemia; PVC  |
|        |         |                      |                      | (premature           |
|        |         |                      |                      | ventricular          |
|        |         |                      |                      | contraction)         |
+--------+---------+----------------------+----------------------+----------------------+
 
 
 
 Social History
 
 
 
+--------------+-------+-----------+--------+------+
| Tobacco Use  | Types | Packs/Day | Years  | Date |
|              |       |           | Used   |      |
+--------------+-------+-----------+--------+------+
| Never Smoker |       |           |        |      |
+--------------+-------+-----------+--------+------+
 
 
 
+---------------------+---+---+---+
| Smokeless Tobacco:  |   |   |   |
| Never Used          |   |   |   |
+---------------------+---+---+---+
 
 
 
+---------------------------------------------------------------+
| Comments: some second hand smoke exposure, but fairly minimal |
+---------------------------------------------------------------+
 
 
 
+-------------+-------------+---------+----------+
| Alcohol Use | Drinks/Week | oz/Week | Comments |
+-------------+-------------+---------+----------+
| No          |             |         |          |
+-------------+-------------+---------+----------+
 
 
 
+------------------+---------------+
| Sex Assigned at  | Date Recorded |
| Birth            |               |
+------------------+---------------+
| Not on file      |               |
+------------------+---------------+
 
 
 
+----------------+-------------+-------------+
| Job Start Date | Occupation  | Industry    |
+----------------+-------------+-------------+
| Not on file    | Not on file | Not on file |
+----------------+-------------+-------------+
 
 
 
+----------------+--------------+------------+
| Travel History | Travel Start | Travel End |
+----------------+--------------+------------+
 
 
 
+-------------------------------------+
| No recent travel history available. |
+-------------------------------------+
 documented as of this encounter
 
 Last Filed Vital Signs
 
 
 
+-------------------+-------------------+----------------------+----------+
| Vital Sign        | Reading           | Time Taken           | Comments |
+-------------------+-------------------+----------------------+----------+
| Blood Pressure    | 100/70            | 10/10/2019  1:25 PM  |          |
|                   |                   | PDT                  |          |
+-------------------+-------------------+----------------------+----------+
| Pulse             | 68                | 10/10/2019  1:25 PM  |          |
|                   |                   | PDT                  |          |
+-------------------+-------------------+----------------------+----------+
| Temperature       | -                 | -                    |          |
+-------------------+-------------------+----------------------+----------+
| Respiratory Rate  | 18                | 10/10/2019  1:25 PM  |          |
|                   |                   | PDT                  |          |
+-------------------+-------------------+----------------------+----------+
| Oxygen Saturation | -                 | -                    |          |
+-------------------+-------------------+----------------------+----------+
| Inhaled Oxygen    | -                 | -                    |          |
| Concentration     |                   |                      |          |
+-------------------+-------------------+----------------------+----------+
| Weight            | 119.2 kg (262 lb  | 10/10/2019  1:25 PM  |          |
|                   | 12.6 oz)          | PDT                  |          |
+-------------------+-------------------+----------------------+----------+
| Height            | 167.6 cm (5' 6")  | 10/10/2019  1:25 PM  |          |
|                   |                   | PDT                  |          |
+-------------------+-------------------+----------------------+----------+
| Body Mass Index   | 42.42             | 10/10/2019  1:25 PM  |          |
|                   |                   | PDT                  |          |
+-------------------+-------------------+----------------------+----------+
 documented in this encounter
 
 Patient Instructions
 Patient Instructions Stacie Valencia, Medical Assistant - 10/10/2019  1:30 PM PDTYou hakeem
l start taking Eliquis 5 mg by mouth twice daily for stroke risk. 
 
 Mobile cardiac telemetry: 2 weeks
 
  Date:____________________________________
 
  Check-In Time:____________________________
 
  Where to Check In:_________________________
 
 Echo:  
  Date:____________________________________
 
  Check-In Time:____________________________
 
  Where to Check In:_________________________
 
 Persantine/Lexiscan Myoview
 
 Date: _______________________________________________ 
 
 Check-in Time: _______________________________________
 
 Where to Check In: _______________________________________
 
 Instructions
 
 
 1. Nothing to eat or drink anything 6 hours prior to Persantine/Lexiscan
 2.  DO NOT drink caffeine 12 hours prior to the test.
 3.  DO NOT take any Metoprolol the night before or the morning of the test.
 4.  You can take all other medications the morning of the test with a small sip of water.
 5.  Please bring a list of your current medications with you.
 
 Resting Portion of test:
 
 Date: _________________________________________________
 
 Check-in Time: _________________________________________
 
 Where to Check In: _________________________________________
 
 Referral sent for cardiac rehab at [facility]
 
 Follow up appointment: 4 weeks test results
 
  Provider: Abebe Townsend MD
 
  Date:___________________________________________________
 
  Check-In Time:___________________________________________
 
 Electronically signed by Lauren Marie RN at 10/10/2019  3:01 PM PDT
 documented in this encounter
 
 Progress Notes
 Tonia Wilson ARNP - 10/10/2019  1:30 PM PDTFormatting of this note might be different fr
om the original.
    
 
 PATIENT NAME:  Viviana Ricci  
 : 1945:         AGE: 73 y.o.
  PRIMARY CARE:
 Marzena Moser DO
 CC:    
 
 OUTPATIENT FOLLOW UP VISIT
 
 Date of Service: 10/10/2019 
 
 HISTORY OF PRESENT ILLNESS:
 Viviana Ricci is a 73 y.o. female with a history of coronary artery disease post CABG x 3 
in , history of diabetes, renal failure post a renal transplantation, morbid obesity, in
activity, hypertension, hyperlipidemia, pulmonary hypertension and severe arthritis. She is 
being seen today for follow up coronary artery disease.
 
 She was last seen 18 at which time, she would have labs done for BMP, magnesium, TSH, 
free T4, and vitamin D. She would be sent a letter or called with the results. She would oth
erwise continue with her current medical regimen.She would follow up in 1 year for office vi
sit, or sooner with concerns. She would have an ECG at her follow up visit and she would hav
e fasting labs prior to visit for lipid profile, CMP and CBC, if not done prior by another carolina quinn. Since that time, she reports she has felt about the same.
 
 She saw her PCP on 19 for an office visit for follow up.  She stated that she had drast
lindally changed in her eating habits to address her weight problem. She was now referred to i
t as an "unhealthy addiction". She looked into going to PhD counselor, but it was to expensi
ve. She stated that she had given up "pop, candy, chocolate, cake, baked things with white f
 
lour and white sugar." As a result, she stated that she was eating one meal per day. She adm
its that it had diminished her by mouth fluid intake somewhat. She would increase her Porgra
f to 1.5 mg, AM and 1 mg PM. She would recheck the Prograf level and Urine Pro/Cr ration in 
one week. She would follow up in 4 months. 
 
 She has had a fair energy level. When it is cold she has less energy. She has not been very
 active. She enjoys computer, talking on the phone in her spare time. She has not had any ch
est pain or discomfort at rest or with exertion.  She notes that even prior to her open hear
t surgery she did not have any chest discomfort, only shortness of breath.  She has been not
icing increased shortness of breath recently.  She attributed it to the change in weather.  
She feels that she is not able to walk up into her Hindu without stopping to rest to catch 
her breath. This is similar to her symptoms prior to bypass. She has not had any lightheaded
ness or dizziness. She has not noticed palpitations. She has noticed mild swelling at ankles
 by the end of the day that is resolved in the morning, which has been stable for her. She s
leeps on 1 pillow at night without any shortness of breath.  She sleeps with CPAP machine in
 place nightly.  She wears compression socks.
 
 MEDICAL, SURGICAL, AND PERSONAL HISTORY
 Past Medical, Surgical, Family, and Social History are reviewed in EPIC.
 
 CURRENT PROBLEMS
 Patient Active Problem List 
 Diagnosis 
   Chronic low back pain 
   Hypothyroidism 
   Mixed hyperlipidemia 
   Complications of transplanted kidney 
   Movement disorder 
   Diabetes mellitus type II, uncontrolled  
   Pulmonary hypertension  
   Coronary artery disease involving native coronary artery of native heart without angina
 pectoris 
   JACK on CPAP 
   Obesity hypoventilation syndrome  
   Kidney replaced by transplant 
   Secondary hyperparathyroidism  
   Dyspnea 
   FSGS (focal segmental glomerulosclerosis) 
   Murmur 
   Cardiomegaly 
   Hypertension, essential 
   PLMD (periodic limb movement disorder) 
   Chest pain 
   UTI (lower urinary tract infection) 
   Renal transplant recipient 
   Thyroid activity decreased 
   Type 2 DM with CKD stage 2 and hypertension  
 
 CURRENT MEDICATIONS
 Current Outpatient Medications 
 Medication Sig Dispense Refill 
   allopurinol (ZYLOPRIM) 100 mg tablet take 1 tablet by mouth once daily 30 tablet 11 
   aspirin 81 mg EC tablet Take 81 mg by mouth Daily.   
   B-D INS SYRINGE 0.5CC/31GX5/16 31G X 5/16" 0.5 ML MISC USE BEFORE MEALS AS DIRECTED 1 e
ach 11 
   cholecalciferol (VITAMIN D-3) 2000 UNITS TABS Take 5,000 Units by mouth Every other day
.   
   cinacalcet (SENSIPAR) 30 mg tablet take 1 tablet by mouth once daily 90 tablet 3 
   cyclobenzaprine (FLEXERIL) 10 mg tablet Take 1 tablet by mouth Twice  daily as needed f
or Muscle spasms. 45 tablet 0 
 
   fludrocortisone (FLORINEF) 0.1 mg tablet Take 1 tablet by mouth Every other day.   
   fludrocortisone (FLORINEF) 0.1 mg tablet Take 1 tablet by mouth Every other day. 45 tab
let 3 
   furosemide (LASIX) 40 mg tablet Take 1 tablet by mouth Daily as needed. 30 tablet 11 
   glucose blood VI test strips (ONE TOUCH ULTRA TEST) strip Check blood sugar before each
 meal and as directed 100 each 12 
   insulin glargine (LANTUS) 100 units/mL injection (vial) inject 20 units subcutaneously 
every morning 10 vial 11 
   insulin lispro (HUMALOG) 100 units/mL injection (vial) inject subcutaneously BEFORE CHIKA
LS ACCORDING TO SLIDING SCALE Max dose 20 units daily 10 vial 11 
   levothyroxine (SYNTHROID) 50 mcg tablet take 1 tablet by mouth once daily 30 tablet 11 
   lisinopril (PRINIVIL,ZESTRIL) 30 MG tablet take 1 tablet by mouth once daily 90 tablet 
3 
   loperamide (ANTI-DIARRHEAL) 2 mg capsule Take 1 capsule by mouth 4 times daily as neede
d. 60 capsule 5 
   losartan (COZAAR) 50 mg tablet take 1 tablet by mouth once daily 90 tablet 3 
   magnesium oxide (MAG-OX) 400 mg tablet take 1 tablet by mouth twice a day 60 tablet 11 
   metoprolol tartrate (LOPRESSOR) 25 mg tablet take 1 tablet by mouth twice a day 60 tabl
et 11 
   mycophenolate (CELLCEPT) 250 mg capsule Take 1 capsule by mouth 2 times daily. 60 capsu
le 11 
   predniSONE (DELTASONE) 5 mg tablet take 1 tablet by mouth once daily 90 tablet 3 
   Prenatal Vit-Fe Fumarate-FA (PNV PRENATAL PLUS MULTIVITAMIN) 27-1 MG TABS take 1 tablet
 by mouth once daily. 30 tablet 11 
   QVAR REDIHALER 80 MCG/ACT inhaler    
   Respiratory Therapy Supplies MISC Continue nocturnal O2 at 2 l/m through CPAP for lifet
bart. Dx: JACK G47.33 Please provide new nasal mask for CPAP and any other needed replacement 
supplies. 1 each 0 
   Respiratory Therapy Supplies MISC Decrease CPAP to 12-18 cmH2O Diagnosis Code(s)327.23.
 Please send order to [facility]. 1 each 0 
   rosuvastatin (CRESTOR) 20 mg tablet take 1 tablet by mouth NIGHTLY 30 tablet 11 
   tacrolimus (PROGRAF) 1 mg capsule Take 1 capsule by mouth 2 times daily. For a total do
se of 1 mg, by mouth, 2 times daily. 240 capsule  
 
 No current facility-administered medications for this visit.  
  
 
 ALLERGIES
 No Known Allergies
 
 ROS
 Review of Systems 
 Constitutional: Negative for chills, fever and malaise/fatigue. 
 HENT: Negative for hearing loss, nosebleeds and tinnitus.  
 Eyes: Negative for blurred vision and double vision. 
 Respiratory: Negative for shortness of breath.  
 Cardiovascular: Negative for chest pain, palpitations and leg swelling. 
 Gastrointestinal: Negative for abdominal pain, blood in stool, constipation, diarrhea, hear
tburn, nausea and vomiting. 
 Genitourinary: Negative for frequency, hematuria and urgency. 
 Musculoskeletal: Positive for back pain, joint pain, myalgias and neck pain. Negative for f
alls. 
 Skin: Negative for itching and rash. 
 Neurological: Negative for dizziness, tingling, tremors, seizures, loss of consciousness an
d weakness. 
      Lightheadedness -- No 
 Endo/Heme/Allergies: Bruises/bleeds easily. 
 Psychiatric/Behavioral: Negative for memory loss. The patient is not nervous/anxious and do
es not have insomnia.  
  
 
 
 OBJECTIVE:  
 
 PHYSICAL EXAM
 /70  | Pulse 68  | Resp 18  | Ht 1.676 m (5' 6")  | Wt 119.2 kg (262 lb 12.6 oz)  | B
MI 42.42 kg/m 
 Physical Exam 
 Constitutional: She is oriented to person, place, and time. She appears well-developed and 
well-nourished. 
 Elderly female in no acute distress, arrived alone 
 Neck: Normal carotid pulses and no JVD present. Carotid bruit is not present. 
 Cardiovascular: Normal rate, regular rhythm, S1 normal, S2 normal and intact distal pulses.
 Frequent extrasystoles are present. PMI is not displaced. Exam reveals distant heart sounds
 (due to girth). Exam reveals no gallop and no friction rub. 
 Murmur heard.
  Holosystolic murmur is present with a grade of 1/6.
 Pulses:
      Carotid pulses are 2+ on the right side, and 2+ on the left side.
      Posterior tibial pulses are 1+ on the right side, and 1+ on the left side. 
 Pulmonary/Chest: Effort normal and breath sounds normal. No accessory muscle usage. No resp
iratory distress. She has no wheezes. She has no rhonchi. She has no rales. 
 Abdominal: Soft. Normal appearance and normal aorta. She exhibits no abdominal bruit. There
 is no hepatosplenomegaly (difficult to fully evaluate due to body habitus). There is no ten
derness. 
 Musculoskeletal: She exhibits edema (trace at right ankle, 1+ on left ankle). 
 Neurological: She is alert and oriented to person, place, and time. Gait (using front wheel
ed walker) abnormal. 
 Skin: Skin is warm and dry. No cyanosis. Nails show no clubbing. 
 Psychiatric: She has a normal mood and affect. Her mood appears not anxious. She does not e
xhibit a depressed mood. 
 Vitals reviewed.
 
 ECG: I personally independently reviewed ECG tracing during this visit (interpreted and cherise
led by another provider):
 Results for orders placed or performed in visit on 18 
 ECG 12 lead 
 Result Value Ref Range 
  INTERPRETATION TEXT   
   Sinus rhythm with 1st degree AV block with frequent premature ventricular complexes
 Left axis deviation
 Pulmonary disease pattern
 Nonspecific ST abnormality
 Abnormal ECG
 When compared with ECG of 2017 14:19,
 premature ventricular complexes are now present
 Confirmed by ERIC REYES, ABEBE ([id number]) on 2018 3:58:44 PM
  
   Which is compared to today's ECG 10/10/2019: Possible atrial fibrillation versus junction
al rhythm with frequent unifocal PVCs, rate of 68 beats per minute. Reviewed with Dr. Leyda mcdaniel.
 
 LAB RESULTS reviewed during visit today primarily from [facility]: 
 LIPID
 Lab Results 
 Component Value Date 
  CHOL 162 2013 
  TRIG 225 2013 
  HDL 45 2013 
  LDL 72 2013 
  LDLEX 51 2019 
 
  HDLEX 51 2019 
  TRIGEX 115 2019 
  CHOLEX 125 2019 
 
 CHEMISTRY
 Lab Results 
 Component Value Date 
   (H) 2018 
  GLUEX 89 2019 
   2018 
  NAEX 144 2019 
  K 5.3 (H) 2018 
  KEX 5.1 2019 
   (H) 2018 
  CLEX 110 2019 
  CO2 21 (L) 2018 
  CO2EX 22 2019 
  CALCIUM 9.8 2018 
  ALKPHOS 100 2014 
  AST 20 07/10/2013 
  ASTEX 46 2019 
  ALT 14 07/10/2013 
  ALTEX 37 2019 
  BILITOT 0.6 2014 
  CREA 1.48 (H) 2018 
  BUN 43 (H) 2018 
  EGFR 31.0 07/10/2013 
  EGFREX 35 2019 
  CREEX 1.47 2019 
 
 HEMATOLOGY
 Lab Results 
 Component Value Date 
  WBC 4.6 07/10/2013 
  WBCEX 5.6 2019 
  HGB 11.9 07/10/2013 
  HGBEX 13.9 2019 
  HCT 35.7 07/10/2013 
  HCTEX 42.6 2019 
   (A) 07/10/2013 
  PLTEX 176 2019 
  
 Lab Results 
 Component Value Date 
  TSH 1.26 2018 
  TSHEX 1.85 2019 
   (H) 2013 
   (H) 2013 
 
 I reviewed records from PCP for office visit on 19 which is summarized in the HPI. 
 
 Above data and testing is reviewed this visit; testing below is historical data unless othe
rwise specified.
 ASSESSMENT:  
 
 1. Coronary artery disease:
 A.  Status post CABG x 3 in  with LIMA to the LAD, SVG to the OM1 and OM2.
             B.  A persantine sestamibi stress test on 07 revealed a medium sized, mod
erate in severity fixed defect of the anterior wall, and a small sized mild in severity fixe
d defect of the inferior wall, LVEF by gated SPECT was 66%.
 
             C.  Bilateral heart catheterization on 05/03/10, shows severe two vessel corona
ry artery disease, a chronic occlusion through the proximal portion of the left anterior jed
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
rtery, grafts: open left internal mammary artery graft to the mid left anterior descending a
rtery, open saphenous vein grafts to the OM1 and OM2, mildly dilated left ventricular cavity
 with a normal left systolic function, LVEF 70%, mild to moderate pulmonary hypertension and
 a normal cardiac output, coronary circulation is right dominant, normal system pressure.
             D.  Persantine nuclear medicine stress test 14 shows a normal myocardial p
erfusion imaging study with normal left ventricular size, wall thickness, LVEF by gated SPEC
T of 78%.  
             E. Today, 10/10/2019, she is symptomatic with shortness of breath and fatigue t
hat is worsened recently. She is on a medical regimen with dual antiplatelet therapy, aspiri
n, ACE-I, beta-blocker and statin. 
  There are no signs or symptoms of overt congestive heart failure, and her physical exam sh
ows no significant fluid retention.  She is in class III- Symptoms with minimal exertion of 
the New [employer] Association functional class. Heart failure stage A-pre-heart failure. Brien ponce use the furosemide only when she need it. She takes it approximately every 10 days.  
  The ECG showed some changes with frequent PVCs, and based on this it appears to be occurri
ng approximately 40% of the time. With this as well as her increased fatigue and shortness o
f breath, which was her anginal equivalent prior to her CABG, further evaluation with Persan
darrel nuclear medicine stress test is warranted.
  She has a history of cardiac issues including CAD. She is unable to walk on a treadmill du
e to osteoarthritis. The nuclear medicine stress test is preferred for her over a non-nuclea
r straight treadmill stress test due to as she is unable to walk on a treadmill due to osteo
arthritis. She does have a diagnosis of coronary artery disease. She has had an abnormal ECG
 in the past 30 days. She has symptoms of atypical angina. She has risk factors including:ob
esity, abnormal lipid profile, diabetes and hypertension. She does not have a family history
 of premature coronary artery disease. She is considered to be high risk.  
 
 2. Possible Atrial fibrillation- will presume while further evaluating: 
  A. Her ECG shows undetermined arrhythmia that appears to be either atrial fibrillation or 
junctional rhythm with frequent unifocal PVCs.  
  Her risks for stroke include: Hx of HTN (1), Diabetes (1), Vascular disease (1), Age 65 to
 75 (1) and Female Gender (1). Her [id number] score is 5, which gives an estimated 6.7% ri
sk of stroke per year in atrial fibrillation.  Her bleeding risks include: >63 YO (1)  and N
SAIDS/ASA (1).  Her HASBLED score is 2-3, which confers an intermediate risk (4.1-5.8%)  ris
k of bleed per year. We will treat her as if she has atrial fibrillation for stroke preventi
on, and we will start her on Eliquis 5 mg twice daily. She will have mobile cardiac telemetr
y for 14 days to evaluate for atrial fibrillation and PVC burden.  She was given 6 weeks Laura
mercy 5 mg samples. Will not send in prescription until it is confirmed on her further evalua
tion.
  Her rate is well controlled, so at this time no medication changes are made.
   
 3. Unifocal PVC's:
  A. Today, 10/10/2019, on the ECG shows more PVCs in office. Estimated burden would be 40%,
 which would be very concerning. She will be scheduled for stress test and mobile cardiac te
lemetry to evaluate etiology as well as burden.
 
 4. Right sided heart failure/ cor pulmonale / severe pulmonary hypertension:
             A. The echocardiogram from 02/19/10 revealed moderate bi-atrial dilatation and 
normal left ventricular size, wall thickness and motion, LVEF is 55-60%, dilated right ventr
icle with moderate hypo-kinesis, moderate tricuspid valve regurgitation, severe pulmonary hy
pertension with a peak systolic pressure of 80 mmHg, and a small pericardial effusion. 
             B. Echocardiogram from 4/15/14 showed Mild left atrial dilatation. Normal left 
ventricular size, wall thickness and motion. Preserved  left ventricular systolic function. 
LVEF is 70-75%. Grade 1 left ventricular diastole dysfunction. Mild tricuspid valve regurgit
ation. Mild thickened trileaflet aortic valve with adequate opening. A mild aortic valve ins
ufficiency. Normal right-sided pressure.
             C. Echocardiogram 18 showing, moderate left atrial dilatation. Normal lef
t ventricular size with a mild concentric left ventricular hypertrophy.  Left ventricular sy
 
stolic dysfunction is preserved.  LVEF is 60-65%. Mild aortic root dilatation measuring 3.9 
cm in diameter, mildly thickened and calcified trileaflet aortic valve with adequate opening
. There is aortic valve sclerosis without significant aortic valve stenosis. Mildly thickene
d and calcified mitral valve with a mild mitral valve regurgitation, mild mitral annular claudia
cification. Normal right-sided pressure. Normal IVC with normal respiratory collapse. When c
ompared to echocardiography on 4/15/14, no significant changes.
             D. Today, 10/10/2019, she is actually well controlled with her intermittent diu
retic dose. Will recheck echocardiogram.  
  
 5. Hypertension, essential:
             A. Today, 10/10/2019, her blood pressure is well controlled in office.     
  
 6.  Hyperlipidemia, mixed.
             A. Today, 10/10/2019, she remains on rosuvastatin.   
  
 7.  Obstructive sleep apnea:
             A. Today, 10/10/2019, she use her CPAP nightly. 
  
 8.  Morbid obesity.
             A. Today, 10/10/2019, Body mass index is 42.42 kg/m.. This is stable since he
r last visit.  
  
 9. Type II diabetes. Not otherwise addressed today 10/10/2019. 
  
 10. Chronic kidney disease: Not otherwise addressed today 10/10/2019.  
             A.  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 Followed by Dr. Moser.
  
 11.  DVT left leg 2015: Not otherwise addressed today 10/10/2019.  
             A.  She took a one year course of apixaban, now on aspirin alone.
  
 12.  Hypothyroidism. She hasn't had lab for this for a couple years. Not otherwise addresse
d today 10/10/2019.  
 
 PLAN:  
 
 1. She would scheduled for an mobile cardiac telemetry for 14 days for PVC's and potential 
atrial fibrillation. 
 2. She will be scheduled for a persantine nuclear medicine stress test for coronary artery 
disease. 
 3. She will be scheduled for an echocardiogram for her pulmonary hypertension. 
 4. She will be referred to [facility] in [place] for coronary artery disease. 
 5. She will start taking Eliquis 5 mg by mouth twice daily for stroke risk. 
 6. She will follow up in 4 weeks  for office visit, or sooner with concerns.  
 
 I spent 45 minutes face to face with the patient, with over 50% spent in counseling and/or 
coordination of care regarding potential atrial fibrillation, stroke risk, PVCs and risk of 
high percent burden. 
 Electronically signed by: 
 BELKIS Arredondo on 10/10/2019 
 
 Stacie MENDEZ Medical Assistant am acting as a scribe on behalf of, and in the pres
ence of BELKIS Arredondo. - Karsten Leahy 10/10/2019 13:54  
 I, BELKIS Arredondo, personally performed the services described in this documentation, 
as scribed in my presence and it is both accurate and complete. BELKIS Arredondo 10/10/20
19  
 Portions of this chart may have been created with Dragon voice recognition software. Occasi
onal wrong-word or   sound-alike  substitutions may have occurred due to the inherent naqvi
itations of voice recognition software. Please read the chart carefully and recognize, using
 context, where these substitutions have occurred.   Electronically signed by BELKIS Arredondo at 10/11/2019  5:33 PM PDTdocumented in this encounter
 
 
 Plan of Treatment
 
 
+--------+-----------+------------+----------------------+-------------------+
| Date   | Type      | Specialty  | Care Team            | Description       |
+--------+-----------+------------+----------------------+-------------------+
| / | Office    | Cardiology |   Tonia Wilson,    |                   |
|    | Visit     |            | ARNP  [address]   |                   |
|        |           |            | [place]  |                   |
|        |           |            | [id number]  [phone]  |                   |
|        |           |            |  [phone] (Fax)  |                   |
+--------+-----------+------------+----------------------+-------------------+
| / | Hospital  | Radiology  |   Abebe Townsend, |                   |
|    | Encounter |            |  MD  [address] |                   |
|        |           |            |  [place],   |                   |
|        |           |            | [place] [address]             |                   |
|        |           |            | [phone]         |                   |
|        |           |            | [phone] (Fax)   |                   |
+--------+-----------+------------+----------------------+-------------------+
| / | Surgery   | Radiology  |   Abebe Townsend, | CV EP PPM SYSTEM  |
|    |           |            |  MD  [address] | IMPLANT           |
|        |           |            |  St.  [place],   |                   |
|        |           |            | [place] [address]             |                   |
|        |           |            | [phone]         |                   |
|        |           |            | [phone] (Fax)   |                   |
+--------+-----------+------------+----------------------+-------------------+
| 02/10/ | Clinical  | Cardiology |                      |                   |
|    | Support   |            |                      |                   |
+--------+-----------+------------+----------------------+-------------------+
| / | Office    | Cardiology |   Tonia Wilson,    |                   |
|    | Visit     |            | BELKIS  [address]   |                   |
|        |           |            |   [place]  |                   |
|        |           |            | [address]  [phone]  |                   |
|        |           |            |  [phone] (Fax)  |                   |
+--------+-----------+------------+----------------------+-------------------+
| / | Off-Site  | Nephrology |   Marzena Moser  |                   |
|    | Visit     |            | DO ETIENNE  [address]      |                   |
|        |           |            | [address]      |                   |
|        |           |            | [place]      |                   |
|        |           |            | [id number]  [phone]  |                   |
|        |           |            |  [phone] (Fax)  |                   |
+--------+-----------+------------+----------------------+-------------------+
 documented as of this encounter
 
 Procedures
 
 
+----------------+--------+-------------+----------------------+----------------------+
| Procedure Name | Priori | Date/Time   | Associated Diagnosis | Comments             |
|                | ty     |             |                      |                      |
+----------------+--------+-------------+----------------------+----------------------+
| ECG 12 LEAD    | Routin | 10/10/2019  |   Hypertension,      |   Results for this   |
|                | e      |  1:55 PM    | essential            | procedure are in the |
|                |        | PDT         |                      |  results section.    |
+----------------+--------+-------------+----------------------+----------------------+
 documented in this encounter
 
 Results
 Mobile Cardiac Telemetry (2019  1:58 PM PST)
 
 
+------------------------------------------------------------------------+--------------+
| Narrative                                                              | Performed At |
+------------------------------------------------------------------------+--------------+
|   Abebe Townsend MD       2019 14:04  Mobile cardiac telemetry |   [facility]  |
|  from 10/16 until 10/30/2019.  Baseline EKG show atrial fibrillation   |              |
| with heart rate ranging   from 33 to 149 bpm.  PVCs, ventricular       |              |
| couplets, 4 beat run nonsustained ventricular   tachycardia was noted. |              |
|      Episodes of rapid ventricular response with heart rate of149 bpm  |              |
|   was noted.  Pauses up to 3.8-second were present.  Findings suggest  |              |
| potential tacky/bradycardia syndrome.                                  |              |
+------------------------------------------------------------------------+--------------+
 
 
 
+--------------+---------+--------------------+--------------+
| Performing   | Address | City/State/Zipcode | Phone Number |
| Organization |         |                    |              |
+--------------+---------+--------------------+--------------+
|   [facility]  |         |                    |              |
+--------------+---------+--------------------+--------------+
 ECHO Complete (10/30/2019  5:07 PM PDT)
 
+-------------+---------+-----------+-------------+--------------+
| Component   | Value   | Ref Range | Performed   | Pathologist  |
|             |         |           | At          | Signature    |
+-------------+---------+-----------+-------------+--------------+
| Patient     | 262 lbs |           | [facility] |              |
| Weight      |         |           |             |              |
| (lbs)       |         |           |             |              |
+-------------+---------+-----------+-------------+--------------+
| Patient     | 5'6     |           | [facility] |              |
| Height      |         |           |             |              |
+-------------+---------+-----------+-------------+--------------+
| LVIDd       | 4.13    | cm        | [facility] |              |
+-------------+---------+-----------+-------------+--------------+
| FS          | 28      | %         | [facility] |              |
+-------------+---------+-----------+-------------+--------------+
| LA volume   | 94.38   | mL        | [facility] |              |
+-------------+---------+-----------+-------------+--------------+
| Ascending   | 3.33    | cm        | [facility] |              |
| aorta       |         |           |             |              |
+-------------+---------+-----------+-------------+--------------+
| Aortic arch | 2.24    | cm        | [facility] |              |
+-------------+---------+-----------+-------------+--------------+
| IVRT        | 107.27  | msec      | [facility] |              |
+-------------+---------+-----------+-------------+--------------+
| LVOT peak   | 77.14   | cm/s      | [facility] |              |
| travon         |         |           |             |              |
+-------------+---------+-----------+-------------+--------------+
| AV peak travon | 139.85  | cm/s      | [facility] |              |
+-------------+---------+-----------+-------------+--------------+
| AV peak     | 7.82    | mmHg      | [facility] |              |
| gradient    |         |           |             |              |
+-------------+---------+-----------+-------------+--------------+
| LA Volume   | 42      | mL/m2     | [facility] |              |
| Index       |         |           |             |              |
+-------------+---------+-----------+-------------+--------------+
| AV LVOT     | 2.38    | mmHg      | [facility] |              |
| Peak        |         |           |             |              |
 
| Gradient    |         |           |             |              |
+-------------+---------+-----------+-------------+--------------+
| TR Peak     | 18      | mmHg      | [facility] |              |
| Gradient    |         |           |             |              |
+-------------+---------+-----------+-------------+--------------+
| TR Velocity | 214.5   | cm        | [facility] |              |
+-------------+---------+-----------+-------------+--------------+
| LV          | 7.19    | cm        | [facility] |              |
| Diastolic   |         |           |             |              |
| Length 4C   |         |           |             |              |
+-------------+---------+-----------+-------------+--------------+
| LV          | 50      | %         | [facility] |              |
| Moran's   |         |           |             |              |
| Biplane EF  |         |           |             |              |
+-------------+---------+-----------+-------------+--------------+
| LV ED       | 45.92   | ml        | [facility] |              |
| Volume      |         |           |             |              |
| (Moran's) |         |           |             |              |
+-------------+---------+-----------+-------------+--------------+
| LV ED       | 20      | ml/m2     | [facility] |              |
| Volume      |         |           |             |              |
| Index       |         |           |             |              |
+-------------+---------+-----------+-------------+--------------+
| LV ES       | 23.42   | ml        | [facility] |              |
| Volume      |         |           |             |              |
+-------------+---------+-----------+-------------+--------------+
| MV          | 197.48  | msec      | [facility] |              |
| Deceleratio |         |           |             |              |
| n Time      |         |           |             |              |
+-------------+---------+-----------+-------------+--------------+
| MV Peak     | 81.53   | cm/s      | [facility] |              |
| E-Wave      |         |           |             |              |
+-------------+---------+-----------+-------------+--------------+
| LA/Aorta    | 1.19    |           | [facility] |              |
| Ratio       |         |           |             |              |
+-------------+---------+-----------+-------------+--------------+
| LA Area     | 27.42   | cm2       | [facility] |              |
+-------------+---------+-----------+-------------+--------------+
| LV ES       | 10      | ml/m2     | [facility] |              |
| Volume      |         |           |             |              |
| Index       |         |           |             |              |
+-------------+---------+-----------+-------------+--------------+
| Aortic Root | 3.7     | cm        | [facility] |              |
|  Diameter   |         |           |             |              |
+-------------+---------+-----------+-------------+--------------+
| IVS         | 1.15    | cm        | [facility] |              |
| Diastolic   |         |           |             |              |
| Thickness   |         |           |             |              |
| MM          |         |           |             |              |
+-------------+---------+-----------+-------------+--------------+
| LVPW        | 1.21    | cm        | [facility] |              |
| Diastolic   |         |           |             |              |
| Thickness   |         |           |             |              |
| MM          |         |           |             |              |
+-------------+---------+-----------+-------------+--------------+
| IVS         | 1.29    | cm        | [facility] |              |
| Systolic    |         |           |             |              |
| Thickness   |         |           |             |              |
| MM          |         |           |             |              |
+-------------+---------+-----------+-------------+--------------+
 
| LV Systolic | 2.97    | cm        | [facility] |              |
|  Diameter   |         |           |             |              |
| MM          |         |           |             |              |
+-------------+---------+-----------+-------------+--------------+
| LVPW        | 1.65    | cm        | [facility] |              |
| Systolic    |         |           |             |              |
| Thickness   |         |           |             |              |
| MM          |         |           |             |              |
+-------------+---------+-----------+-------------+--------------+
| AV Cusp     | 1.61    | cm        | [facility] |              |
| Seperation  |         |           |             |              |
| MM          |         |           |             |              |
+-------------+---------+-----------+-------------+--------------+
| LA Systolic | 4.4     | cm        | [facility] |              |
|  Diameter   |         |           |             |              |
| MM          |         |           |             |              |
+-------------+---------+-----------+-------------+--------------+
| LVEF-TTE    | 55      | %         | [facility] |              |
| TRANSTHORAC |         |           |             |              |
| IC ECHO     |         |           |             |              |
+-------------+---------+-----------+-------------+--------------+
| RA PRESSURE | 8       | mmHg      | [facility] |              |
+-------------+---------+-----------+-------------+--------------+
| RVSP        | 26      | mmHg      | [facility] |              |
| Estimated   |         |           |             |              |
+-------------+---------+-----------+-------------+--------------+
 
 
 
+----------+
| Specimen |
+----------+
|          |
+----------+
 
 
 
+------------------------------------------------------------------------+---------------+
| Narrative                                                              | Performed At  |
+------------------------------------------------------------------------+---------------+
|   This result has an attachment that is not available.  1.   Mild      |   [facility] |
| biatrial dilatation.2.   Normal left ventricular size with a mild      |               |
| concentric left ventricular hypertrophy.   Left ventricular systolic   |               |
| function is preserved.   LVEF is 55-60%.3.   Mildly thickened and      |               |
| calcified trileaflet aortic valve with adequate opening.   There is    |               |
| aortic valve sclerosis without significant aortic valve stenosis.4.    |               |
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
| change.   There is a mild mitral valve regurgitation.5.   Mild mitral  |               |
| annular calcification.6.   Mild tricuspid valve regurgitation.7.       |               |
|   Normal right-sided pressure.   8.   Dilated IVC with a normal        |               |
| respiratory collapse.9.   When compared to echocardiography on         |               |
| 2018, no significant changes.                                     |               |
|8.   Dilated IVC with a normal respiratory collapse.                    |               |
|9.   When compared to echocardiography on 2018, no significant    |               |
|changes.                                                               |               |
+------------------------------------------------------------------------+---------------+
 
 
 
+---------------+---------+--------------------+--------------+
 
| Performing    | Address | City/State/Zipcode | Phone Number |
| Organization  |         |                    |              |
+---------------+---------+--------------------+--------------+
|   [facility] |         |                    |              |
+---------------+---------+--------------------+--------------+
 NM Nuclear Stress Test (Vasodilator) (10/30/2019 12:56 PM PDT)
 
+-------------+--------+-----------+-------------+--------------+
| Component   | Value  | Ref Range | Performed   | Pathologist  |
|             |        |           | At          | Signature    |
+-------------+--------+-----------+-------------+--------------+
| BASELINE    | 93     | bpm       | [facility] |              |
| HEART RATE  |        |           |             |              |
+-------------+--------+-----------+-------------+--------------+
| BASELINE    | 99/51  | mmHg      | [facility] |              |
| BLOOD       |        |           |             |              |
| PRESSURE    |        |           |             |              |
+-------------+--------+-----------+-------------+--------------+
| PEAK HEART  | 109    |           | [facility] |              |
| RATE        |        |           |             |              |
+-------------+--------+-----------+-------------+--------------+
| PEAK BLOOD  | 111/42 | mmHG      | [facility] |              |
| PRESSURE    |        |           |             |              |
+-------------+--------+-----------+-------------+--------------+
| Target HR   | 126    |           | [facility] |              |
+-------------+--------+-----------+-------------+--------------+
| Percent HR  | 74     |           | [facility] |              |
+-------------+--------+-----------+-------------+--------------+
| LVEF-SPECT  | 63     | %         | [facility] |              |
| NUCLEAR     |        |           |             |              |
| STRESS/VIAB |        |           |             |              |
| ILITY       |        |           |             |              |
+-------------+--------+-----------+-------------+--------------+
| ST          | 0.0    | mm        | [facility] |              |
| Elevation   |        |           |             |              |
| (mm)        |        |           |             |              |
+-------------+--------+-----------+-------------+--------------+
 
 
 
+----------+
| Specimen |
+----------+
|          |
+----------+
 
 
 
+----------------------------------------------------------------------+---------------+
| Narrative                                                            | Performed At  |
+----------------------------------------------------------------------+---------------+
|   This result has an attachment that is not available.  1.           |   [facility] |
|   Persantine EKG is negative.2.   Normal Persantine sestamibi        |               |
| myocardial perfusion imaging study.   Normal left ventricular size,  |               |
| wall thickness and motion.   Preserved left ventricular systolic     |               |
| function.   LVEF by gated SPECT is 63%.                              |               |
+----------------------------------------------------------------------+---------------+
 
 
 
 
+---------------+---------+--------------------+--------------+
| Performing    | Address | City/State/Zipcode | Phone Number |
| Organization  |         |                    |              |
+---------------+---------+--------------------+--------------+
|   [facility] |         |                    |              |
+---------------+---------+--------------------+--------------+
 ECG 12 lead (10/10/2019  1:55 PM PDT)
 
+-------------+--------------------------+-----------+------------+--------------+
| Component   | Value                    | Ref Range | Performed  | Pathologist  |
|             |                          |           | At         | Signature    |
+-------------+--------------------------+-----------+------------+--------------+
| VENTRICULAR | 68                       | BPM       | [facility]  |              |
|  RATE EKG   |                          |           |            |              |
+-------------+--------------------------+-----------+------------+--------------+
| ATRIAL RATE | 57                       | BPM       | [facility]  |              |
+-------------+--------------------------+-----------+------------+--------------+
| QRS         | 90                       | ms        | [facility]  |              |
| DURATION    |                          |           |            |              |
+-------------+--------------------------+-----------+------------+--------------+
| Q-T         | 422                      | ms        | [facility]  |              |
| INTERVAL    |                          |           |            |              |
+-------------+--------------------------+-----------+------------+--------------+
| Q-T         | 448                      | ms        | [facility]  |              |
| INTERVAL    |                          |           |            |              |
| (CORRECTED) |                          |           |            |              |
+-------------+--------------------------+-----------+------------+--------------+
| QRS AXIS    | -33                      | degrees   | [facility]  |              |
+-------------+--------------------------+-----------+------------+--------------+
| T AXIS      | -120                     | degrees   | [facility]  |              |
+-------------+--------------------------+-----------+------------+--------------+
| INTERPRETAT | Possible atrial fibLeft  |           | [facility]  |              |
| ION TEXT    | axis deviationLow        |           |            |              |
|             | voltage QRSSeptal        |           |            |              |
|             | infarct , age            |           |            |              |
|             | undeterminedAbnormal     |           |            |              |
|             | ECGWhen compared with    |           |            |              |
|             | ECG of 21-AUG-2018       |           |            |              |
|             | 15:01,Current            |           |            |              |
|             | undetermined rhythm      |           |            |              |
|             | precludes rhythm         |           |            |              |
|             | comparison, needs        |           |            |              |
|             | reviewST now depressed   |           |            |              |
|             | in Lateral               |           |            |              |
|             | leadsNonspecific T wave  |           |            |              |
|             | abnormality, worse in    |           |            |              |
|             | Inferior                 |           |            |              |
|             | leadsNonspecific T wave  |           |            |              |
|             | abnormality now evident  |           |            |              |
|             | in Lateral               |           |            |              |
|             | leadsConfirmed by        |           |            |              |
|             | ERIC REYES, ABEBE     |           |            |              |
|             | ([id number]) on 10/10/2019    |           |            |              |
|             | 2:40:53 PM               |           |            |              |
+-------------+--------------------------+-----------+------------+--------------+
 
 
 
+----------+
| Specimen |
 
+----------+
|          |
+----------+
 
 
 
+----------------------------------------------------------+--------------+
| Narrative                                                | Performed At |
+----------------------------------------------------------+--------------+
|   This result has an attachment that is not available.   |              |
+----------------------------------------------------------+--------------+
 
 
 
+--------------+---------+--------------------+--------------+
| Performing   | Address | City/State/Zipcode | Phone Number |
| Organization |         |                    |              |
+--------------+---------+--------------------+--------------+
|   [facility]  |         |                    |              |
+--------------+---------+--------------------+--------------+
 documented in this encounter
 
 Visit Diagnoses
 
 
+-------------------------------------------------------------------------------------+
| Diagnosis                                                                           |
+-------------------------------------------------------------------------------------+
|   Coronary artery disease involving native coronary artery of native heart without  |
| angina pectoris - Primary                                                           |
+-------------------------------------------------------------------------------------+
|   Hypertension, essential  Unspecified essential hypertension                       |
+-------------------------------------------------------------------------------------+
|   Mixed hyperlipidemia                                                              |
+-------------------------------------------------------------------------------------+
|   PVC (premature ventricular contraction)  Other premature beats                    |
+-------------------------------------------------------------------------------------+
 documented in this encounter

## 2020-01-08 NOTE — XMS
Encounter Summary
  Created on: 2020
 
 Viviana Ricci
 External Reference #: [id number]
 : 47
 Sex: Female
 
 Demographics
 
 
+-----------------------+----------------------+
| Address               | [address]      |
|                       | [place]  [address] |
+-----------------------+----------------------+
| Home Phone            | [phone]      |
+-----------------------+----------------------+
| Preferred Language    | Unknown              |
+-----------------------+----------------------+
| Marital Status        | Single               |
+-----------------------+----------------------+
| Rastafarian Affiliation | [id number]                 |
+-----------------------+----------------------+
| Race                  | Unknown              |
+-----------------------+----------------------+
| Ethnic Group          | Unknown              |
+-----------------------+----------------------+
 
 
 Author
 
 
+--------------+--------------------------------------------+
| Author       | [facility]  |
|              | and [place]                                |
+--------------+--------------------------------------------+
| Organization | [facility]  |
|              | and [place]                                |
+--------------+--------------------------------------------+
| Address      | Unknown                                    |
+--------------+--------------------------------------------+
| Phone        | Unavailable                                |
+--------------+--------------------------------------------+
 
 
 
 Support
 
 
+----------------+--------------+---------------------+-----------------+
| Name           | Relationship | Address             | Phone           |
+----------------+--------------+---------------------+-----------------+
| Ada/Ed Radhames | ECON         | [address]              | [phone] |
|                |              | [place]  |                 |
|                |              |  [address]              |                 |
+----------------+--------------+---------------------+-----------------+
 
 
 
 
 Care Team Providers
 
 
+-----------------------+------+-------------+
| Care Team Member Name | Role | Phone       |
+-----------------------+------+-------------+
 PCP  | Unavailable |
+-----------------------+------+-------------+
 
 
 
 Encounter Details
 
 
+--------+-----------+----------------------+---------------------+-------------+
| Date   | Type      | Department           | Care Team           | Description |
+--------+-----------+----------------------+---------------------+-------------+
| / | [facility]  |   [facility] |   Guillaume Arzate  |             |
|    | Encounter |  MED CTR SLEEP       | MD Jean Pierre  [address]   |             |
|        |           | [address] [place] | [place]   [place]    |             |
|        |           |   [place]    | [place] [address]     |             |
|        |           | [address]           | [phone]        |             |
|        |           | [phone]         | [phone] (Fax)  |             |
+--------+-----------+----------------------+---------------------+-------------+
 
 
 
 Social History
 
 
+----------------+-------+-----------+--------+------+
| Tobacco Use    | Types | Packs/Day | Years  | Date |
|                |       |           | Used   |      |
+----------------+-------+-----------+--------+------+
| Never Assessed |       |           |        |      |
+----------------+-------+-----------+--------+------+
 
 
 
+------------------+---------------+
| Sex Assigned at  | Date Recorded |
| Birth            |               |
+------------------+---------------+
| Not on file      |               |
+------------------+---------------+
 
 
 
+----------------+-------------+-------------+
| Job Start Date | Occupation  | Industry    |
+----------------+-------------+-------------+
| Not on file    | Not on file | Not on file |
+----------------+-------------+-------------+
 
 
 
+----------------+--------------+------------+
| Travel History | Travel Start | Travel End |
+----------------+--------------+------------+
 
 
 
 
+-------------------------------------+
| No recent travel history available. |
+-------------------------------------+
 documented as of this encounter
 
 Plan of Treatment
 
 
+--------+-----------+------------+----------------------+-------------------+
| Date   | Type      | Specialty  | Care Team            | Description       |
+--------+-----------+------------+----------------------+-------------------+
| / | Office    | Cardiology |   Tonia Wilson,    |                   |
|    | Visit     |            | [address]   |                   |
|        |           |            | St  [place], [place]  |                   |
|        |           |            | [address]  [phone]  |                   |
|        |           |            |  [phone] (Fax)  |                   |
+--------+-----------+------------+----------------------+-------------------+
| / | Hospital  | Radiology  |   Popeye Townsend, |                   |
|    | Encounter |            |  MD  [address] [place] |                   |
|        |           |            |  [place],   |                   |
|        |           |            | [place] [address]             |                   |
|        |           |            | [phone]         |                   |
|        |           |            | [phone] (Fax)   |                   |
+--------+-----------+------------+----------------------+-------------------+
| / | Surgery   | Radiology  |   Popeye Townsend, | CV EP PPM SYSTEM  |
|    |           |            |  MD  [address] | IMPLANT           |
|        |           |            |  St.  [place],   |                   |
|        |           |            | [place] [address]             |                   |
|        |           |            | [phone]         |                   |
|        |           |            | [phone] (Fax)   |                   |
+--------+-----------+------------+----------------------+-------------------+
| 02/10/ | Clinical  | Cardiology |                      |                   |
|    | Support   |            |                      |                   |
+--------+-----------+------------+----------------------+-------------------+
| / | Office    | Cardiology |   Tonia Wilson,    |                   |
|    | Visit     |            | BEL[place]  [address]   |                   |
|        |           |            | [place]  |                   |
|        |           |            | [address]  [phone]  |                   |
|        |           |            |  [phone] (Fax)  |                   |
+--------+-----------+------------+----------------------+-------------------+
| / | Off-Site  | Nephrology |   Marzena Moser  |                   |
|    | Visit     |            | DO ETIENNE  [address] [place] [address]      |                   |
|        |           |            | [address]      |                   |
|        |           |            | [place]      |                   |
|        |           |            | [address] [phone]  |                   |
|        |           |            |  [phone] (Fax)  |                   |
+--------+-----------+------------+----------------------+-------------------+
 documented as of this encounter
 
 Visit Diagnoses
 Not on filedocumented in this encounter

## 2020-01-08 NOTE — XMS
Encounter Summary
  Created on: 2020
 
 Viviana Ricci
 External Reference #: [id number]
 : 47
 Sex: Female
 
 Demographics
 
 
+-----------------------+----------------------+
| Address               | [address]      |
|                       | [place]  [address] |
+-----------------------+----------------------+
| Home Phone            | [phone]      |
+-----------------------+----------------------+
| Preferred Language    | Unknown              |
+-----------------------+----------------------+
| Marital Status        | Single               |
+-----------------------+----------------------+
| Confucianist Affiliation | [id number]                 |
+-----------------------+----------------------+
| Race                  | Unknown              |
+-----------------------+----------------------+
| Ethnic Group          | Unknown              |
+-----------------------+----------------------+
 
 
 Author
 
 
+--------------+--------------------------------------------+
| Author       | [facility]  |
|              | and [place]                                |
+--------------+--------------------------------------------+
| Organization | [facility]  |
|              | and [place]                                |
+--------------+--------------------------------------------+
| Address      | Unknown                                    |
+--------------+--------------------------------------------+
| Phone        | Unavailable                                |
+--------------+--------------------------------------------+
 
 
 
 Support
 
 
+----------------+--------------+---------------------+-----------------+
| Name           | Relationship | Address             | Phone           |
+----------------+--------------+---------------------+-----------------+
| Ada/Ed Radhames | ECON         | [address]              | [phone] |
|                |              | [place]  |                 |
|                |              |  [address]              |                 |
+----------------+--------------+---------------------+-----------------+
 
 
 
 
 Care Team Providers
 
 
+-----------------------+------+-------------+
| Care Team Member Name | Role | Phone       |
+-----------------------+------+-------------+
 PCP  | Unavailable |
+-----------------------+------+-------------+
 
 
 
 Encounter Details
 
 
+--------+-----------+----------------------+----------------------+-------------+
| Date   | Type      | Department           | Care Team            | Description |
+--------+-----------+----------------------+----------------------+-------------+
| / | [facility]  |   [facility] |   Marzena Moser  |             |
|    | Encounter |  MED CTR XRAY  [address] | M, DO  [address]      |             |
|        |           |  [place]       | [address]      |             |
|        |           | [place] [address] | [place]      |             |
|        |           |   [phone]       | [address] [phone]  |             |
|        |           |                      |  [phone] (Fax)  |             |
+--------+-----------+----------------------+----------------------+-------------+
 
 
 
 Social History
 
 
+----------------+-------+-----------+--------+------+
| Tobacco Use    | Types | Packs/Day | Years  | Date |
|                |       |           | Used   |      |
+----------------+-------+-----------+--------+------+
| Never Assessed |       |           |        |      |
+----------------+-------+-----------+--------+------+
 
 
 
+------------------+---------------+
| Sex Assigned at  | Date Recorded |
| Birth            |               |
+------------------+---------------+
| Not on file      |               |
+------------------+---------------+
 
 
 
+----------------+-------------+-------------+
| Job Start Date | Occupation  | Industry    |
+----------------+-------------+-------------+
| Not on file    | Not on file | Not on file |
+----------------+-------------+-------------+
 
 
 
+----------------+--------------+------------+
| Travel History | Travel Start | Travel End |
+----------------+--------------+------------+
 
 
 
 
+-------------------------------------+
| No recent travel history available. |
+-------------------------------------+
 documented as of this encounter
 
 Plan of Treatment
 
 
+--------+-----------+------------+----------------------+-------------------+
| Date   | Type      | Specialty  | Care Team            | Description       |
+--------+-----------+------------+----------------------+-------------------+
| / | Office    | Cardiology |   Tonia Wilson,    |                   |
|    | Visit     |            | ARNJESSICA  [address]   |                   |
|        |           |            | St  [place], [place]  |                   |
|        |           |            | [address]  [phone]  |                   |
|        |           |            |  [phone] (Fax)  |                   |
+--------+-----------+------------+----------------------+-------------------+
| / | Hospital  | Radiology  |   Popeye Townsend, |                   |
|    | Encounter |            |  MD  [address] |                   |
|        |           |            |  St. [place],   |                   |
|        |           |            | [place] [address]             |                   |
|        |           |            | [phone]         |                   |
|        |           |            | [phone] (Fax)   |                   |
+--------+-----------+------------+----------------------+-------------------+
| / | Surgery   | Radiology  |   Popeye Townsend, | CV EP PPM SYSTEM  |
|    |           |            |  MD  [address] | IMPLANT           |
|        |           |            |  [place],   |                   |
|        |           |            | [place] [address]             |                   |
|        |           |            | [phone]         |                   |
|        |           |            | [phone] (Fax)   |                   |
+--------+-----------+------------+----------------------+-------------------+
| 02/10/ | Clinical  | Cardiology |                      |                   |
|    | Support   |            |                      |                   |
+--------+-----------+------------+----------------------+-------------------+
| / | Office    | Cardiology |   Tonia Wilson,    |                   |
|    | Visit     |            | BELKIS  [address]   |                   |
|        |           |            | [place]  |                   |
|        |           |            | [address]  [phone]  |                   |
|        |           |            |  [phone] (Fax)  |                   |
+--------+-----------+------------+----------------------+-------------------+
| / | Off-Site  | Nephrology |   Marzena Moser  |                   |
|    | Visit     |            | DO ETIENNE  [address] [place] [address]      |                   |
|        |           |            | [address]      |                   |
|        |           |            | [place]      |                   |
|        |           |            | [address] [phone]  |                   |
|        |           |            |  [phone] (Fax)  |                   |
+--------+-----------+------------+----------------------+-------------------+
 documented as of this encounter
 
 Visit Diagnoses
 Not on filedocumented in this encounter

## 2020-01-08 NOTE — XMS
Encounter Summary
  Created on: 2020
 
 Viviana Ricci
 External Reference #: [id number]
 : 47
 Sex: Female
 
 Demographics
 
 
+-----------------------+----------------------+
| Address               | [address]      |
|                       | [place]  [address] |
+-----------------------+----------------------+
| Home Phone            | [phone]      |
+-----------------------+----------------------+
| Preferred Language    | Unknown              |
+-----------------------+----------------------+
| Marital Status        | Single               |
+-----------------------+----------------------+
| Hindu Affiliation | [id number]                 |
+-----------------------+----------------------+
| Race                  | Unknown              |
+-----------------------+----------------------+
| Ethnic Group          | Unknown              |
+-----------------------+----------------------+
 
 
 Author
 
 
+--------------+--------------------------------------------+
| Author       | [facility]  |
|              | and [place]                                |
+--------------+--------------------------------------------+
| Organization | [facility]  |
|              | and [place]                                |
+--------------+--------------------------------------------+
| Address      | Unknown                                    |
+--------------+--------------------------------------------+
| Phone        | Unavailable                                |
+--------------+--------------------------------------------+
 
 
 
 Support
 
 
+----------------+--------------+---------------------+-----------------+
| Name           | Relationship | Address             | Phone           |
+----------------+--------------+---------------------+-----------------+
| Ada/Ed Radhames | ECON         | [address]              | [phone] |
|                |              | [place]  |                 |
|                |              |  [address]              |                 |
+----------------+--------------+---------------------+-----------------+
 
 
 
 
 Care Team Providers
 
 
+-----------------------+------+-------------+
| Care Team Member Name | Role | Phone       |
+-----------------------+------+-------------+
 PCP  | Unavailable |
+-----------------------+------+-------------+
 
 
 
 Reason for Visit
 
 
+-------------------+----------+
| Reason            | Comments |
+-------------------+----------+
| Medication Refill |          |
+-------------------+----------+
 
 
 
 Encounter Details
 
 
+--------+--------+---------------------+----------------------+-------------------+
| Date   | Type   | Department          | Care Team            | Description       |
+--------+--------+---------------------+----------------------+-------------------+
| / | Refill |   [facility]         |   Marzena Moser  | Medication Refill |
|    |        | NEPHROLOGY  [address]   | M, DO  [address]      |                   |
|        |        | [address]   | [address]      |                   |
|        |        | [place], [place]     | [place], [place]      |                   |
|        |        | [address]          | [address]  [phone]  |                   |
|        |        | [phone]        |  [phone] (Fax)  |                   |
+--------+--------+---------------------+----------------------+-------------------+
 
 
 
 Social History
 
 
+--------------+-------+-----------+--------+------+
| Tobacco Use  | Types | Packs/Day | Years  | Date |
|              |       |           | Used   |      |
+--------------+-------+-----------+--------+------+
| Never Smoker |       |           |        |      |
+--------------+-------+-----------+--------+------+
 
 
 
+---------------------+---+---+---+
| Smokeless Tobacco:  |   |   |   |
| Never Used          |   |   |   |
+---------------------+---+---+---+
 
 
 
+---------------------------------------------------------------+
| Comments: some second hand smoke exposure, but fairly minimal |
 
+---------------------------------------------------------------+
 
 
 
+-------------+-------------+---------+----------+
| Alcohol Use | Drinks/Week | oz/Week | Comments |
+-------------+-------------+---------+----------+
| No          |             |         |          |
+-------------+-------------+---------+----------+
 
 
 
+------------------+---------------+
| Sex Assigned at  | Date Recorded |
| Birth            |               |
+------------------+---------------+
| Not on file      |               |
+------------------+---------------+
 
 
 
+----------------+-------------+-------------+
| Job Start Date | Occupation  | Industry    |
+----------------+-------------+-------------+
| Not on file    | Not on file | Not on file |
+----------------+-------------+-------------+
 
 
 
+----------------+--------------+------------+
| Travel History | Travel Start | Travel End |
+----------------+--------------+------------+
 
 
 
+-------------------------------------+
| No recent travel history available. |
+-------------------------------------+
 documented as of this encounter
 
 Plan of Treatment
 
 
+--------+-----------+------------+----------------------+-------------------+
| Date   | Type      | Specialty  | Care Team            | Description       |
+--------+-----------+------------+----------------------+-------------------+
| / | Office    | Cardiology |   HellalfredoTonia,    |                   |
|    | Visit     |            | ARNP  [address]   |                   |
|        |           |            | St  [place], [place]  |                   |
|        |           |            | [address]  [phone]  |                   |
|        |           |            |  [phone] (Fax)  |                   |
+--------+-----------+------------+----------------------+-------------------+
| / | Hospital  | Radiology  |   Popeye Townsend, |                   |
|    | Encounter |            |  MD  [address] |                   |
|        |           |            |  St.  [place],   |                   |
|        |           |            | [place] [address]             |                   |
|        |           |            | [phone]         |                   |
|        |           |            | [phone] (Fax)   |                   |
+--------+-----------+------------+----------------------+-------------------+
| / | Surgery   | Radiology  |   Popeye Townsend, | CV EP PPM SYSTEM  |
 
|    |           |            |  MD  [address] | IMPLANT           |
|        |           |            |  St.  [place],   |                   |
|        |           |            | [place] [address]             |                   |
|        |           |            | [phone]         |                   |
|        |           |            | [phone] (Fax)   |                   |
+--------+-----------+------------+----------------------+-------------------+
| 02/10/ | Clinical  | Cardiology |                      |                   |
|    | Support   |            |                      |                   |
+--------+-----------+------------+----------------------+-------------------+
| / | Office    | Cardiology |   Tonia Wilson,    |                   |
|    | Visit     |            | ARNP  [address]   |                   |
|        |           |            | [place]  |                   |
|        |           |            | [address]  [phone]  |                   |
|        |           |            |  [phone] (Fax)  |                   |
+--------+-----------+------------+----------------------+-------------------+
| / | Off-Site  | Nephrology |   Marzena Moser  |                   |
|    | Visit     |            | DO ETIENNE  [address] [place] [address]      |                   |
|        |           |            | [address]      |                   |
|        |           |            | [place]      |                   |
|        |           |            | [address]  [phone]  |                   |
|        |           |            |  [phone] (Fax)  |                   |
+--------+-----------+------------+----------------------+-------------------+
 documented as of this encounter
 
 Visit Diagnoses
 Not on filedocumented in this encounter

## 2020-01-08 NOTE — XMS
Encounter Summary
  Created on: 2020
 
 Viviana Ricci
 External Reference #: [id number]
 : 47
 Sex: Female
 
 Demographics
 
 
+-----------------------+----------------------+
| Address               | [address]      |
|                       | [place]  [address] |
+-----------------------+----------------------+
| Home Phone            | [phone]      |
+-----------------------+----------------------+
| Preferred Language    | Unknown              |
+-----------------------+----------------------+
| Marital Status        | Single               |
+-----------------------+----------------------+
| Islam Affiliation | 1009                 |
+-----------------------+----------------------+
| Race                  | Unknown              |
+-----------------------+----------------------+
| Ethnic Group          | Unknown              |
+-----------------------+----------------------+
 
 
 Author
 
 
+--------------+--------------------------------------------+
| Author       | [facility]  |
|              | and [place]                                |
+--------------+--------------------------------------------+
| Organization | [facility]  |
|              | and [place]                                |
+--------------+--------------------------------------------+
| Address      | Unknown                                    |
+--------------+--------------------------------------------+
| Phone        | Unavailable                                |
+--------------+--------------------------------------------+
 
 
 
 Support
 
 
+----------------+--------------+---------------------+-----------------+
| Name           | Relationship | Address             | Phone           |
+----------------+--------------+---------------------+-----------------+
| Ada/Ed Radhames | ECON         | [place]              | [phone] |
|                |              | [place], [place]  |                 |
|                |              |  [address]              |                 |
+----------------+--------------+---------------------+-----------------+
 
 
 
 
 Care Team Providers
 
 
+-----------------------+------+-------------+
| Care Team Member Name | Role | Phone       |
+-----------------------+------+-------------+
 PCP  | Unavailable |
+-----------------------+------+-------------+
 
 
 
 Reason for Referral
 Evaluate & Treat (Routine)
 
+--------+--------------+-----------+--------------+--------------+---------------+
| Status | Reason       | Specialty | Diagnoses /  | Referred By  | Referred To   |
|        |              |           | Procedures   | Contact      | Contact       |
+--------+--------------+-----------+--------------+--------------+---------------+
| Closed |   Specialty  | Vascular  |   Diagnoses  |   [facility]  |   [facility]      |
|        | Services     | Surgery   |  Left leg    | Marzena CLIFTON,   | Nita,      |
|        | Required     |           | swelling     | DO  [address] | MD Antoine   |
|        |              |           |              |  [address] | [address]  |
|        |              |           |              |  [address]  [place]  | [address]          |
|        |              |           |              | [place]    | [place], [place]  |
|        |              |           |              | [address]        | [address]  Phone: |
|        |              |           |              | Phone:       |  [phone] |
|        |              |           |              | [phone] |   Fax:        |
|        |              |           |              |   Fax:       | [phone]  |
|        |              |           |              | [phone] |               |
+--------+--------------+-----------+--------------+--------------+---------------+
 
 
 
 
 Reason for Visit
 
 
+--------------+----------+
| Reason       | Comments |
+--------------+----------+
| Leg Swelling | left     |
+--------------+----------+
 
 
 
 Encounter Details
 
 
+--------+-----------+---------------------+----------------------+---------------------+
| Date   | Type      | Department          | Care Team            | Description         |
+--------+-----------+---------------------+----------------------+---------------------+
| / | Telephone |   [facility]         |   Marzena Moser  | Leg Swelling (left) |
| 2015   |           | NEPHROLOGY  [address]   | M, DO  [address]      |                     |
|        |           | [address]   | [address]      |                     |
|        |           | [place], [place]     | [place], [place]      |                     |
|        |           | [address]          | [address]  [phone]  |                     |
|        |           | [phone]        |  [phone] (Fax)  |                     |
+--------+-----------+---------------------+----------------------+---------------------+
 
 
 
 
 Social History
 
 
+--------------+-------+-----------+--------+------+
| Tobacco Use  | Types | Packs/Day | Years  | Date |
|              |       |           | Used   |      |
+--------------+-------+-----------+--------+------+
| Never Smoker |       |           |        |      |
+--------------+-------+-----------+--------+------+
 
 
 
+---------------------+---+---+---+
| Smokeless Tobacco:  |   |   |   |
| Never Used          |   |   |   |
+---------------------+---+---+---+
 
 
 
+---------------------------------------------------------------+
| Comments: some second hand smoke exposure, but fairly minimal |
+---------------------------------------------------------------+
 
 
 
+-------------+-------------+---------+----------+
| Alcohol Use | Drinks/Week | oz/Week | Comments |
+-------------+-------------+---------+----------+
| No          |             |         |          |
+-------------+-------------+---------+----------+
 
 
 
+------------------+---------------+
| Sex Assigned at  | Date Recorded |
| Birth            |               |
+------------------+---------------+
| Not on file      |               |
+------------------+---------------+
 
 
 
+----------------+-------------+-------------+
| Job Start Date | Occupation  | Industry    |
+----------------+-------------+-------------+
| Not on file    | Not on file | Not on file |
+----------------+-------------+-------------+
 
 
 
+----------------+--------------+------------+
| Travel History | Travel Start | Travel End |
+----------------+--------------+------------+
 
 
 
+-------------------------------------+
| No recent travel history available. |
 
+-------------------------------------+
 documented as of this encounter
 
 Plan of Treatment
 
 
+--------+-----------+------------+----------------------+-------------------+
| Date   | Type      | Specialty  | Care Team            | Description       |
+--------+-----------+------------+----------------------+-------------------+
| / | Office    | Cardiology |   Tonia Wilson,    |                   |
| 2020   | Visit     |            | ARNJESSICA  [address]   |                   |
|        |           |            | St  [place]  |                   |
|        |           |            | [address]  [phone]  |                   |
|        |           |            |  [phone] (Fax)  |                   |
+--------+-----------+------------+----------------------+-------------------+
| / | Hospital  | Radiology  |   Popeye Townsend, |                   |
|    | Encounter |            |  MD  [address] |                   |
|        |           |            |  St.  [place],   |                   |
|        |           |            | [place] [address]             |                   |
|        |           |            | [phone]         |                   |
|        |           |            | [phone] (Fax)   |                   |
+--------+-----------+------------+----------------------+-------------------+
| / | Surgery   | Radiology  |   Popeye Townsend, | CV EP PPM SYSTEM  |
|    |           |            |  MD  [address] | IMPLANT           |
|        |           |            |  St.  [place],   |                   |
|        |           |            | [place] [address]             |                   |
|        |           |            | [phone]         |                   |
|        |           |            | [phone] (Fax)   |                   |
+--------+-----------+------------+----------------------+-------------------+
| 02/10/ | Clinical  | Cardiology |                      |                   |
|    | Support   |            |                      |                   |
+--------+-----------+------------+----------------------+-------------------+
| / | Office    | Cardiology |   Tonia Wilson,    |                   |
|    | Visit     |            | [facility]  [address]   |                   |
|        |           |            | St  [place]  |                   |
|        |           |            | [address]  [phone]  |                   |
|        |           |            |  [phone] (Fax)  |                   |
+--------+-----------+------------+----------------------+-------------------+
| / | Off-Site  | Nephrology |   Marzena Moser  |                   |
|    | Visit     |            | DO ETIENNE  [address] [place] [address]      |                   |
|        |           |            | [address]      |                   |
|        |           |            | [place], [place]      |                   |
|        |           |            | [address]  [phone]  |                   |
|        |           |            |  [phone] (Fax)  |                   |
+--------+-----------+------------+----------------------+-------------------+
 
 
 
+--------------------+-------------+--------+----------------------+---------------------+
| Name               | Type        | Priori | Associated Diagnoses | Order Schedule      |
|                    |             | ty     |                      |                     |
+--------------------+-------------+--------+----------------------+---------------------+
| Vascular Surgery,  | Outpatient  | Routin |   Left leg swelling  | Ordered: 2015 |
| External - AMB     | Referral    | e      |                      |                     |
| Referral           |             |        |                      |                     |
+--------------------+-------------+--------+----------------------+---------------------+
 documented as of this encounter
 
 Visit Diagnoses
 
 
 
+-------------------------------+
| Diagnosis                     |
+-------------------------------+
|   Left leg swelling - Primary |
+-------------------------------+
 documented in this encounter

## 2020-01-08 NOTE — XMS
Encounter Summary
  Created on: 2020
 
 Viviana Ricci
 External Reference #: [id number]
 : 47
 Sex: Female
 
 Demographics
 
 
+-----------------------+----------------------+
| Address               | [address]      |
|                       | [place]  [address] |
+-----------------------+----------------------+
| Home Phone            | [phone]      |
+-----------------------+----------------------+
| Preferred Language    | Unknown              |
+-----------------------+----------------------+
| Marital Status        | Single               |
+-----------------------+----------------------+
| Pentecostalism Affiliation | 1009                 |
+-----------------------+----------------------+
| Race                  | Unknown              |
+-----------------------+----------------------+
| Ethnic Group          | Unknown              |
+-----------------------+----------------------+
 
 
 Author
 
 
+--------------+--------------------------------------------+
| Author       | [facility]  |
|              | and [place]                                |
+--------------+--------------------------------------------+
| Organization | [facility]  |
|              | and [place]                                |
+--------------+--------------------------------------------+
| Address      | Unknown                                    |
+--------------+--------------------------------------------+
| Phone        | Unavailable                                |
+--------------+--------------------------------------------+
 
 
 
 Support
 
 
+----------------+--------------+---------------------+-----------------+
| Name           | Relationship | Address             | Phone           |
+----------------+--------------+---------------------+-----------------+
| Ada/Ed Radhames | ECON         | [address]              | [phone] |
|                |              | [place]  |                 |
|                |              |  [address]              |                 |
+----------------+--------------+---------------------+-----------------+
 
 
 
 
 Care Team Providers
 
 
+-----------------------+------+-------------+
| Care Team Member Name | Role | Phone       |
+-----------------------+------+-------------+
 PCP  | Unavailable |
+-----------------------+------+-------------+
 
 
 
 Encounter Details
 
 
+--------+-----------+----------------------+----------------------+-------------+
| Date   | Type      | Department           | Care Team            | Description |
+--------+-----------+----------------------+----------------------+-------------+
| / | Hospital  |   [facility] |   Edgar Sanders,   |             |
|  - | Encounter |  [facility]     | MD  [address] [place] [address]     |             |
|        |           | [address]  | [place]      |             |
| / |           | [place] [address] | [address]  [phone]  |             |
|    |           |   [phone]       |  [phone] (Fax)  |             |
+--------+-----------+----------------------+----------------------+-------------+
 
 
 
 Social History
 
 
+----------------+-------+-----------+--------+------+
| Tobacco Use    | Types | Packs/Day | Years  | Date |
|                |       |           | Used   |      |
+----------------+-------+-----------+--------+------+
| Never Assessed |       |           |        |      |
+----------------+-------+-----------+--------+------+
 
 
 
+------------------+---------------+
| Sex Assigned at  | Date Recorded |
| Birth            |               |
+------------------+---------------+
| Not on file      |               |
+------------------+---------------+
 
 
 
+----------------+-------------+-------------+
| Job Start Date | Occupation  | Industry    |
+----------------+-------------+-------------+
| Not on file    | Not on file | Not on file |
+----------------+-------------+-------------+
 
 
 
+----------------+--------------+------------+
| Travel History | Travel Start | Travel End |
+----------------+--------------+------------+
 
 
 
 
+-------------------------------------+
| No recent travel history available. |
+-------------------------------------+
 documented as of this encounter
 
 Plan of Treatment
 
 
+--------+-----------+------------+----------------------+-------------------+
| Date   | Type      | Specialty  | Care Team            | Description       |
+--------+-----------+------------+----------------------+-------------------+
| / | Office    | Cardiology |   Tonia Wilson,    |                   |
|    | Visit     |            | ARNJESSICA  [address]   |                   |
|        |           |            | St  [place], [place]  |                   |
|        |           |            | [address]  [phone]  |                   |
|        |           |            |  [phone] (Fax)  |                   |
+--------+-----------+------------+----------------------+-------------------+
| / | Hospital  | Radiology  |   Popeye Townsend, |                   |
|    | Encounter |            |  MD  [address] |                   |
|        |           |            |  [place],   |                   |
|        |           |            | [place] [address]             |                   |
|        |           |            | [phone]         |                   |
|        |           |            | [phone] (Fax)   |                   |
+--------+-----------+------------+----------------------+-------------------+
| / | Surgery   | Radiology  |   Popeye Townsend, | CV EP PPM SYSTEM  |
|    |           |            |  MD  [address] | IMPLANT           |
|        |           |            |  [place],   |                   |
|        |           |            | [place] [address]             |                   |
|        |           |            | [phone]         |                   |
|        |           |            | [phone] (Fax)   |                   |
+--------+-----------+------------+----------------------+-------------------+
| 02/10/ | Clinical  | Cardiology |                      |                   |
|    | Support   |            |                      |                   |
+--------+-----------+------------+----------------------+-------------------+
| / | Office    | Cardiology |   Tonia Wilson,    |                   |
|    | Visit     |            | BELKIS  [address]   |                   |
|        |           |            | [place]  |                   |
|        |           |            | [address]  [phone]  |                   |
|        |           |            |  [phone] (Fax)  |                   |
+--------+-----------+------------+----------------------+-------------------+
| / | Off-Site  | Nephrology |   Marzena Moser  |                   |
|    | Visit     |            | DO ETIENNE  [address] [place] [address]      |                   |
|        |           |            | [address]      |                   |
|        |           |            | [place]      |                   |
|        |           |            | [address]  [phone]  |                   |
|        |           |            |  [phone] (Fax)  |                   |
+--------+-----------+------------+----------------------+-------------------+
 documented as of this encounter
 
 Visit Diagnoses
 Not on filedocumented in this encounter

## 2020-01-08 NOTE — XMS
Encounter Summary
  Created on: 2020
 
 Viviana Ricci
 External Reference #: [id number]
 : 47
 Sex: Female
 
 Demographics
 
 
+-----------------------+----------------------+
| Address               | [address]      |
|                       | [place]  [address] |
+-----------------------+----------------------+
| Home Phone            | [phone]      |
+-----------------------+----------------------+
| Preferred Language    | Unknown              |
+-----------------------+----------------------+
| Marital Status        | Single               |
+-----------------------+----------------------+
| Christian Affiliation | [id number]                 |
+-----------------------+----------------------+
| Race                  | Unknown              |
+-----------------------+----------------------+
| Ethnic Group          | Unknown              |
+-----------------------+----------------------+
 
 
 Author
 
 
+--------------+--------------------------------------------+
| Author       | Located within [facility]  |
|              | and [place]                                |
+--------------+--------------------------------------------+
| Organization | Located within [facility]  |
|              | and [place]                                |
+--------------+--------------------------------------------+
| Address      | Unknown                                    |
+--------------+--------------------------------------------+
| Phone        | Unavailable                                |
+--------------+--------------------------------------------+
 
 
 
 Support
 
 
+----------------+--------------+---------------------+-----------------+
| Name           | Relationship | Address             | Phone           |
+----------------+--------------+---------------------+-----------------+
| Ada/Ed Radhames | ECON         | [address]              | [phone] |
|                |              | [place]  |                 |
|                |              |  [address]              |                 |
+----------------+--------------+---------------------+-----------------+
 
 
 
 
 Care Team Providers
 
 
+------------------------+------+-----------------+
| Care Team Member Name  | Role | Phone           |
+------------------------+------+-----------------+
| Marzena Moser DO | PCP  | [phone] |
+------------------------+------+-----------------+
 
 
 
 Reason for Visit
 
 
+-------------------+----------+
| Reason            | Comments |
+-------------------+----------+
| Medication Refill |          |
+-------------------+----------+
 
 
 
 Encounter Details
 
 
+--------+--------+---------------------+----------------------+-------------------+
| Date   | Type   | Department          | Care Team            | Description       |
+--------+--------+---------------------+----------------------+-------------------+
| / | Refill |   [facility]         |   Marzena Moser  | Medication Refill |
| 2018   |        | NEPHROLOGY  [address]   | M, DO  [address]      |                   |
|        |        | [address]   | [address]      |                   |
|        |        | [place], [place]     | [place], [place]      |                   |
|        |        | [address]          | [address]  [phone]  |                   |
|        |        | [phone]        |  [phone] (Fax)  |                   |
+--------+--------+---------------------+----------------------+-------------------+
 
 
 
 Social History
 
 
+--------------+-------+-----------+--------+------+
| Tobacco Use  | Types | Packs/Day | Years  | Date |
|              |       |           | Used   |      |
+--------------+-------+-----------+--------+------+
| Never Smoker |       |           |        |      |
+--------------+-------+-----------+--------+------+
 
 
 
+---------------------+---+---+---+
| Smokeless Tobacco:  |   |   |   |
| Never Used          |   |   |   |
+---------------------+---+---+---+
 
 
 
+---------------------------------------------------------------+
| Comments: some second hand smoke exposure, but fairly minimal |
 
+---------------------------------------------------------------+
 
 
 
+-------------+-------------+---------+----------+
| Alcohol Use | Drinks/Week | oz/Week | Comments |
+-------------+-------------+---------+----------+
| No          |             |         |          |
+-------------+-------------+---------+----------+
 
 
 
+------------------+---------------+
| Sex Assigned at  | Date Recorded |
| Birth            |               |
+------------------+---------------+
| Not on file      |               |
+------------------+---------------+
 
 
 
+----------------+-------------+-------------+
| Job Start Date | Occupation  | Industry    |
+----------------+-------------+-------------+
| Not on file    | Not on file | Not on file |
+----------------+-------------+-------------+
 
 
 
+----------------+--------------+------------+
| Travel History | Travel Start | Travel End |
+----------------+--------------+------------+
 
 
 
+-------------------------------------+
| No recent travel history available. |
+-------------------------------------+
 documented as of this encounter
 
 Plan of Treatment
 
 
+--------+-----------+------------+----------------------+-------------------+
| Date   | Type      | Specialty  | Care Team            | Description       |
+--------+-----------+------------+----------------------+-------------------+
| / | Office    | Cardiology |   Tonia Wilson,    |                   |
|    | Visit     |            | ARNP  [address]   |                   |
|        |           |            | St [place], [place]  |                   |
|        |           |            | [address]  [phone]  |                   |
|        |           |            |  [phone] (Fax)  |                   |
+--------+-----------+------------+----------------------+-------------------+
| / | Hospital  | Radiology  |   Popeye Townsend, |                   |
|    | Encounter |            |  MD  [address] |                   |
|        |           |            |  [place],   |                   |
|        |           |            | [place] [address]             |                   |
|        |           |            | [phone]         |                   |
|        |           |            | [phone] (Fax)   |                   |
+--------+-----------+------------+----------------------+-------------------+
| / | Surgery   | Radiology  |   Popeye Townsend, | CV EP PPM SYSTEM  |
 
|    |           |            |  MD  [address] | IMPLANT           |
|        |           |            |  [place],   |                   |
|        |           |            | [place] [address]             |                   |
|        |           |            | [phone]         |                   |
|        |           |            | [phone] (Fax)   |                   |
+--------+-----------+------------+----------------------+-------------------+
| 02/10/ | Clinical  | Cardiology |                      |                   |
|    | Support   |            |                      |                   |
+--------+-----------+------------+----------------------+-------------------+
| / | Office    | Cardiology |   RakeshTonia andre,    |                   |
|    | Visit     |            | ARNP  [address]   |                   |
|        |           |            | [place]  |                   |
|        |           |            | [address] [phone]  |                   |
|        |           |            |  [phone] (Fax)  |                   |
+--------+-----------+------------+----------------------+-------------------+
| / | Off-Site  | Nephrology |   Marzena Moser  |                   |
|    | Visit     |            | DO ETIENNE  [address] [place] [address]      |                   |
|        |           |            | [address]      |                   |
|        |           |            | [place]      |                   |
|        |           |            | [address] [phone]  |                   |
|        |           |            |  [phone] (Fax)  |                   |
+--------+-----------+------------+----------------------+-------------------+
 documented as of this encounter
 
 Visit Diagnoses
 Not on filedocumented in this encounter

## 2020-01-08 NOTE — XMS
Encounter Summary
  Created on: 2020
 
 Viviana Ricci
 External Reference #: [id number]
 : 47
 Sex: Female
 
 Demographics
 
 
+-----------------------+----------------------+
| Address               | [address]      |
|                       | [place]  [address] |
+-----------------------+----------------------+
| Home Phone            | [phone]      |
+-----------------------+----------------------+
| Preferred Language    | Unknown              |
+-----------------------+----------------------+
| Marital Status        | Single               |
+-----------------------+----------------------+
| Denominational Affiliation | [id number]                 |
+-----------------------+----------------------+
| Race                  | Unknown              |
+-----------------------+----------------------+
| Ethnic Group          | Unknown              |
+-----------------------+----------------------+
 
 
 Author
 
 
+--------------+--------------------------------------------+
| Author       | [facility]  |
|              | and [place]                                |
+--------------+--------------------------------------------+
| Organization | [facility]  |
|              | and [place]                                |
+--------------+--------------------------------------------+
| Address      | Unknown                                    |
+--------------+--------------------------------------------+
| Phone        | Unavailable                                |
+--------------+--------------------------------------------+
 
 
 
 Support
 
 
+----------------+--------------+---------------------+-----------------+
| Name           | Relationship | Address             | Phone           |
+----------------+--------------+---------------------+-----------------+
| Ada/Ed Radhames | ECON         | [address]              | [phone] |
|                |              | [place]  |                 |
|                |              |  [address]              |                 |
+----------------+--------------+---------------------+-----------------+
 
 
 
 
 Care Team Providers
 
 
+-----------------------+------+-------------+
| Care Team Member Name | Role | Phone       |
+-----------------------+------+-------------+
 PCP  | Unavailable |
+-----------------------+------+-------------+
 
 
 
 Reason for Visit
 
 
+-------------------+----------+
| Reason            | Comments |
+-------------------+----------+
| Medication Refill |          |
+-------------------+----------+
 
 
 
 Encounter Details
 
 
+--------+--------+---------------------+----------------------+-------------------+
| Date   | Type   | Department          | Care Team            | Description       |
+--------+--------+---------------------+----------------------+-------------------+
| / | Refill |   [place]         |   Marzena Moser  | Medication Refill |
|    |        | NEPHROLOGY  [address]   | M, DO  [address]      |                   |
|        |        | [address]   | [address]      |                   |
|        |        | [place], [place]     | [place], [place]      |                   |
|        |        | [address]          | [address]  [phone]  |                   |
|        |        | [phone]        |  [phone] (Fax)  |                   |
+--------+--------+---------------------+----------------------+-------------------+
 
 
 
 Social History
 
 
+--------------+-------+-----------+--------+------+
| Tobacco Use  | Types | Packs/Day | Years  | Date |
|              |       |           | Used   |      |
+--------------+-------+-----------+--------+------+
| Never Smoker |       |           |        |      |
+--------------+-------+-----------+--------+------+
 
 
 
+---------------------+---+---+---+
| Smokeless Tobacco:  |   |   |   |
| Never Used          |   |   |   |
+---------------------+---+---+---+
 
 
 
+---------------------------------------------------------------+
| Comments: some second hand smoke exposure, but fairly minimal |
 
+---------------------------------------------------------------+
 
 
 
+-------------+-------------+---------+----------+
| Alcohol Use | Drinks/Week | oz/Week | Comments |
+-------------+-------------+---------+----------+
| No          |             |         |          |
+-------------+-------------+---------+----------+
 
 
 
+------------------+---------------+
| Sex Assigned at  | Date Recorded |
| Birth            |               |
+------------------+---------------+
| Not on file      |               |
+------------------+---------------+
 
 
 
+----------------+-------------+-------------+
| Job Start Date | Occupation  | Industry    |
+----------------+-------------+-------------+
| Not on file    | Not on file | Not on file |
+----------------+-------------+-------------+
 
 
 
+----------------+--------------+------------+
| Travel History | Travel Start | Travel End |
+----------------+--------------+------------+
 
 
 
+-------------------------------------+
| No recent travel history available. |
+-------------------------------------+
 documented as of this encounter
 
 Plan of Treatment
 
 
+--------+-----------+------------+----------------------+-------------------+
| Date   | Type      | Specialty  | Care Team            | Description       |
+--------+-----------+------------+----------------------+-------------------+
| / | Office    | Cardiology |   HellalfredoTonia,    |                   |
|    | Visit     |            | ARNP  [address]   |                   |
|        |           |            | St  [place], [place]  |                   |
|        |           |            | [address]  [phone]  |                   |
|        |           |            |  [phone] (Fax)  |                   |
+--------+-----------+------------+----------------------+-------------------+
| / | Hospital  | Radiology  |   Popeye Townsend, |                   |
|    | Encounter |            |  MD  [address] |                   |
|        |           |            |  St.  [place],   |                   |
|        |           |            | [place] [address]             |                   |
|        |           |            | [phone]         |                   |
|        |           |            | [phone] (Fax)   |                   |
+--------+-----------+------------+----------------------+-------------------+
| / | Surgery   | Radiology  |   Popeye Townsend, | CV EP PPM SYSTEM  |
 
|    |           |            |  MD  [address] | IMPLANT           |
|        |           |            |  St.  [place],   |                   |
|        |           |            | [place] [address]             |                   |
|        |           |            | [phone]         |                   |
|        |           |            | [phone] (Fax)   |                   |
+--------+-----------+------------+----------------------+-------------------+
| 02/10/ | Clinical  | Cardiology |                      |                   |
|    | Support   |            |                      |                   |
+--------+-----------+------------+----------------------+-------------------+
| / | Office    | Cardiology |   Tonia Wilson,    |                   |
|    | Visit     |            | ARNP  [address]   |                   |
|        |           |            | [place]  |                   |
|        |           |            | [address]  [phone]  |                   |
|        |           |            |  [phone] (Fax)  |                   |
+--------+-----------+------------+----------------------+-------------------+
| / | Off-Site  | Nephrology |   Marzena Moser  |                   |
|    | Visit     |            | DO ETIENNE  [address] [place] [address]      |                   |
|        |           |            | [address]      |                   |
|        |           |            | [place]      |                   |
|        |           |            | [address]  [phone]  |                   |
|        |           |            |  [phone] (Fax)  |                   |
+--------+-----------+------------+----------------------+-------------------+
 documented as of this encounter
 
 Visit Diagnoses
 
 
+------------------------------------------------------------------------------------------+
| Diagnosis                                                                                |
+------------------------------------------------------------------------------------------+
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
| stage IV, or unspecified(403.90) - Primary  Unspecified hypertensive kidney disease with |
|  chronic kidney disease stage I through stage IV, or unspecified                         |
+------------------------------------------------------------------------------------------+
|   Complications of transplanted kidney                                                   |
+------------------------------------------------------------------------------------------+
|   DIABETES MELLITUS, TYPE II, UNCONTROLLED  Type II or unspecified type diabetes         |
| mellitus without mention of complication, uncontrolled                                   |
+------------------------------------------------------------------------------------------+
|   Hyperlipidemia  Other and unspecified hyperlipidemia                                   |
+------------------------------------------------------------------------------------------+
 documented in this encounter

## 2020-01-08 NOTE — XMS
Encounter Summary
  Created on: 2020
 
 Viviana Ricci
 External Reference #: [id number]
 : 47
 Sex: Female
 
 Demographics
 
 
+-----------------------+----------------------+
| Address               | [address]      |
|                       | [place]  [address] |
+-----------------------+----------------------+
| Home Phone            | [phone]      |
+-----------------------+----------------------+
| Preferred Language    | Unknown              |
+-----------------------+----------------------+
| Marital Status        | Single               |
+-----------------------+----------------------+
| Spiritism Affiliation | [id number]                 |
+-----------------------+----------------------+
| Race                  | Unknown              |
+-----------------------+----------------------+
| Ethnic Group          | Unknown              |
+-----------------------+----------------------+
 
 
 Author
 
 
+--------------+--------------------------------------------+
| Author       | [facility]  |
|              | and [place]                                |
+--------------+--------------------------------------------+
| Organization | [facility]  |
|              | and [place]                                |
+--------------+--------------------------------------------+
| Address      | Unknown                                    |
+--------------+--------------------------------------------+
| Phone        | Unavailable                                |
+--------------+--------------------------------------------+
 
 
 
 Support
 
 
+----------------+--------------+---------------------+-----------------+
| Name           | Relationship | Address             | Phone           |
+----------------+--------------+---------------------+-----------------+
| Ada/Ed Radhames | ECON         | [address]              | [phone] |
|                |              | [place]  |                 |
|                |              |  [address]              |                 |
+----------------+--------------+---------------------+-----------------+
 
 
 
 
 Care Team Providers
 
 
+------------------------+------+-----------------+
| Care Team Member Name  | Role | Phone           |
+------------------------+------+-----------------+
| Marzena Moser DO | PCP  | [phone] |
+------------------------+------+-----------------+
 
 
 
 Encounter Details
 
 
+--------+----------+---------------------+----------------------+-------------+
| Date   | Type     | Department          | Care Team            | Description |
+--------+----------+---------------------+----------------------+-------------+
| / | Abstract |   [facility]         |   Marzena Moser  |             |
| 2018   |          | NEPHROLOGY  [address]   | DO ETIENNE  [address]      |             |
|        |          | [address]   | [address]      |             |
|        |          | [place], [place]     | [place], [place]      |             |
|        |          | [address]          | [address]  [phone]  |             |
|        |          | [phone]        |  [phone] (Fax)  |             |
+--------+----------+---------------------+----------------------+-------------+
 
 
 
 Social History
 
 
+--------------+-------+-----------+--------+------+
| Tobacco Use  | Types | Packs/Day | Years  | Date |
|              |       |           | Used   |      |
+--------------+-------+-----------+--------+------+
| Never Smoker |       |           |        |      |
+--------------+-------+-----------+--------+------+
 
 
 
+---------------------+---+---+---+
| Smokeless Tobacco:  |   |   |   |
| Never Used          |   |   |   |
+---------------------+---+---+---+
 
 
 
+---------------------------------------------------------------+
| Comments: some second hand smoke exposure, but fairly minimal |
+---------------------------------------------------------------+
 
 
 
+-------------+-------------+---------+----------+
| Alcohol Use | Drinks/Week | oz/Week | Comments |
+-------------+-------------+---------+----------+
| No          |             |         |          |
+-------------+-------------+---------+----------+
 
 
 
 
+------------------+---------------+
| Sex Assigned at  | Date Recorded |
| Birth            |               |
+------------------+---------------+
| Not on file      |               |
+------------------+---------------+
 
 
 
+----------------+-------------+-------------+
| Job Start Date | Occupation  | Industry    |
+----------------+-------------+-------------+
| Not on file    | Not on file | Not on file |
+----------------+-------------+-------------+
 
 
 
+----------------+--------------+------------+
| Travel History | Travel Start | Travel End |
+----------------+--------------+------------+
 
 
 
+-------------------------------------+
| No recent travel history available. |
+-------------------------------------+
 documented as of this encounter
 
 Plan of Treatment
 
 
+--------+-----------+------------+----------------------+-------------------+
| Date   | Type      | Specialty  | Care Team            | Description       |
+--------+-----------+------------+----------------------+-------------------+
| / | Office    | Cardiology |   Tonia Wilson,    |                   |
|    | Visit     |            | ARNP  [address]   |                   |
|        |           |            | St  [place] [facility] [place]  |                   |
|        |           |            | [address]  [phone]  |                   |
|        |           |            |  [phone] (Fax)  |                   |
+--------+-----------+------------+----------------------+-------------------+
| / | Hospital  | Radiology  |   Popeye Townsend, |                   |
|    | Encounter |            |  MD  [address] |                   |
|        |           |            |  St.  [place],   |                   |
|        |           |            | [place] [address]             |                   |
|        |           |            | [phone]         |                   |
|        |           |            | [phone] (Fax)   |                   |
+--------+-----------+------------+----------------------+-------------------+
| / | Surgery   | Radiology  |   Popeye Townsend, | CV EP PPM SYSTEM  |
|    |           |            |  MD  [address] | IMPLANT           |
|        |           |            |  St.  [place],   |                   |
|        |           |            | [place] [address]             |                   |
|        |           |            | [phone]         |                   |
|        |           |            | [phone] (Fax)   |                   |
+--------+-----------+------------+----------------------+-------------------+
| 02/10/ | Clinical  | Cardiology |                      |                   |
2020   | Support   |            |                      |                   |
+--------+-----------+------------+----------------------+-------------------+
| / | Office    | Cardiology |   Tonia Wilson,    |                   |
|    | Visit     |            | [facility]  [address]   |                   |
 
|        |           |            |   [place]  |                   |
|        |           |            | [address]  [phone]  |                   |
|        |           |            |  [phone] (Fax)  |                   |
+--------+-----------+------------+----------------------+-------------------+
| / | Off-Site  | Nephrology |   Marzena Moser  |                   |
2020   | Visit     |            | DO ETIENNE  [address]      |                   |
|        |           |            | [address]      |                   |
|        |           |            | [place]      |                   |
|        |           |            | [address]  [phone]  |                   |
|        |           |            |  [phone] (Fax)  |                   |
+--------+-----------+------------+----------------------+-------------------+
 documented as of this encounter
 
 Procedures
 
 
+--------------------+--------+------------+----------------------+----------------------+
| Procedure Name     | Priori | Date/Time  | Associated Diagnosis | Comments             |
|                    | ty     |            |                      |                      |
+--------------------+--------+------------+----------------------+----------------------+
| EXTERNAL LAB:      | Routin | 2018 |                      |   Results for this   |
| TACROLIMUS LEVEL,  | e      |            |                      | procedure are in the |
| LC-MS/MS           |        |            |                      |  results section.    |
+--------------------+--------+------------+----------------------+----------------------+
 documented in this encounter
 
 Results
 External Lab: Tacrolimus Level, LC-MS/MS (2018)
 
+-------------+-------+-----------+------------+--------------+
| Component   | Value | Ref Range | Performed  | Pathologist  |
|             |       |           | At         | Signature    |
+-------------+-------+-----------+------------+--------------+
| Tacrolimus, | 4.1   |           |            |              |
|  LC-MS/MS,  |       |           |            |              |
| External    |       |           |            |              |
+-------------+-------+-----------+------------+--------------+
 
 
 
+----------+
| Specimen |
+----------+
|          |
+----------+
 documented in this encounter
 
 Visit Diagnoses
 Not on filedocumented in this encounter

## 2020-01-08 NOTE — XMS
Encounter Summary
  Created on: 2020
 
 Viviana Ricci
 External Reference #: [id number]
 : 47
 Sex: Female
 
 Demographics
 
 
+-----------------------+----------------------+
| Address               | [address]      |
|                       | [place]  [address] |
+-----------------------+----------------------+
| Home Phone            | [phone]      |
+-----------------------+----------------------+
| Preferred Language    | Unknown              |
+-----------------------+----------------------+
| Marital Status        | Single               |
+-----------------------+----------------------+
| Baptism Affiliation | 1009                 |
+-----------------------+----------------------+
| Race                  | Unknown              |
+-----------------------+----------------------+
| Ethnic Group          | Unknown              |
+-----------------------+----------------------+
 
 
 Author
 
 
+--------------+--------------------------------------------+
| Author       | [facility]  |
|              | and [place]                                |
+--------------+--------------------------------------------+
| Organization | [facility]  |
|              | and [place]                                |
+--------------+--------------------------------------------+
| Address      | Unknown                                    |
+--------------+--------------------------------------------+
| Phone        | Unavailable                                |
+--------------+--------------------------------------------+
 
 
 
 Support
 
 
+----------------+--------------+---------------------+-----------------+
| Name           | Relationship | Address             | Phone           |
+----------------+--------------+---------------------+-----------------+
| Ada/Ed Radhames | ECON         | [address]              | [phone] |
|                |              | [place]  |                 |
|                |              |  [address]              |                 |
+----------------+--------------+---------------------+-----------------+
 
 
 
 
 Care Team Providers
 
 
+-----------------------+------+-------------+
| Care Team Member Name | Role | Phone       |
+-----------------------+------+-------------+
 PCP  | Unavailable |
+-----------------------+------+-------------+
 
 
 
 Reason for Visit
 
 
+-------------------+----------+
| Reason            | Comments |
+-------------------+----------+
| Medication Refill |          |
+-------------------+----------+
 
 
 
 Encounter Details
 
 
+--------+--------+---------------------+----------------------+-------------------+
| Date   | Type   | Department          | Care Team            | Description       |
+--------+--------+---------------------+----------------------+-------------------+
| / | Refill |   [place]         |   Marzena Moser  | Medication Refill |
| 2017   |        | NEPHROLOGY  [address]   | M, DO  [address]      |                   |
|        |        | [address]   | [address]      |                   |
|        |        | [place], [place]     | [place], [place]      |                   |
|        |        | [address]          | [address]  [phone]  |                   |
|        |        | [phone]        |  [phone] (Fax)  |                   |
+--------+--------+---------------------+----------------------+-------------------+
 
 
 
 Social History
 
 
+--------------+-------+-----------+--------+------+
| Tobacco Use  | Types | Packs/Day | Years  | Date |
|              |       |           | Used   |      |
+--------------+-------+-----------+--------+------+
| Never Smoker |       |           |        |      |
+--------------+-------+-----------+--------+------+
 
 
 
+---------------------+---+---+---+
| Smokeless Tobacco:  |   |   |   |
| Never Used          |   |   |   |
+---------------------+---+---+---+
 
 
 
+---------------------------------------------------------------+
| Comments: some second hand smoke exposure, but fairly minimal |
 
+---------------------------------------------------------------+
 
 
 
+-------------+-------------+---------+----------+
| Alcohol Use | Drinks/Week | oz/Week | Comments |
+-------------+-------------+---------+----------+
| No          |             |         |          |
+-------------+-------------+---------+----------+
 
 
 
+------------------+---------------+
| Sex Assigned at  | Date Recorded |
| Birth            |               |
+------------------+---------------+
| Not on file      |               |
+------------------+---------------+
 
 
 
+----------------+-------------+-------------+
| Job Start Date | Occupation  | Industry    |
+----------------+-------------+-------------+
| Not on file    | Not on file | Not on file |
+----------------+-------------+-------------+
 
 
 
+----------------+--------------+------------+
| Travel History | Travel Start | Travel End |
+----------------+--------------+------------+
 
 
 
+-------------------------------------+
| No recent travel history available. |
+-------------------------------------+
 documented as of this encounter
 
 Plan of Treatment
 
 
+--------+-----------+------------+----------------------+-------------------+
| Date   | Type      | Specialty  | Care Team            | Description       |
+--------+-----------+------------+----------------------+-------------------+
| / | Office    | Cardiology |   HellalfredoTonia,    |                   |
|    | Visit     |            | ARNP  [address]   |                   |
|        |           |            | St  [place], [place]  |                   |
|        |           |            | [address]  [phone]  |                   |
|        |           |            |  [phone] (Fax)  |                   |
+--------+-----------+------------+----------------------+-------------------+
| / | Hospital  | Radiology  |   Popeye Townsend, |                   |
|    | Encounter |            |  MD  [address] |                   |
|        |           |            |  St.  [place],   |                   |
|        |           |            | [place] [address]             |                   |
|        |           |            | [phone]         |                   |
|        |           |            | [phone] (Fax)   |                   |
+--------+-----------+------------+----------------------+-------------------+
| / | Surgery   | Radiology  |   Popeye Townsend, | CV EP PPM SYSTEM  |
 
|    |           |            |  MD  [address] | IMPLANT           |
|        |           |            |  St.  [place],   |                   |
|        |           |            | [place] [address]             |                   |
|        |           |            | [phone]         |                   |
|        |           |            | [phone] (Fax)   |                   |
+--------+-----------+------------+----------------------+-------------------+
| 02/10/ | Clinical  | Cardiology |                      |                   |
|    | Support   |            |                      |                   |
+--------+-----------+------------+----------------------+-------------------+
| / | Office    | Cardiology |   Tonia Wilson,    |                   |
|    | Visit     |            | ARNP  [address]   |                   |
|        |           |            | [place]  |                   |
|        |           |            | [address]  [phone]  |                   |
|        |           |            |  [phone] (Fax)  |                   |
+--------+-----------+------------+----------------------+-------------------+
| / | Off-Site  | Nephrology |   Marzena Moser  |                   |
|    | Visit     |            | DO ETIENNE  [address] [place] [address]      |                   |
|        |           |            | [address]      |                   |
|        |           |            | [place]      |                   |
|        |           |            | [address]  [phone]  |                   |
|        |           |            |  [phone] (Fax)  |                   |
+--------+-----------+------------+----------------------+-------------------+
 documented as of this encounter
 
 Visit Diagnoses
 Not on filedocumented in this encounter

## 2020-01-08 NOTE — XMS
Encounter Summary
  Created on: 2020
 
 Viviana Ricci
 External Reference #: [id number]
 : 47
 Sex: Female
 
 Demographics
 
 
+-----------------------+----------------------+
| Address               | [address]      |
|                       | [place]  [address] |
+-----------------------+----------------------+
| Home Phone            | [phone]      |
+-----------------------+----------------------+
| Preferred Language    | Unknown              |
+-----------------------+----------------------+
| Marital Status        | Single               |
+-----------------------+----------------------+
| Jehovah's witness Affiliation | [id number]                 |
+-----------------------+----------------------+
| Race                  | Unknown              |
+-----------------------+----------------------+
| Ethnic Group          | Unknown              |
+-----------------------+----------------------+
 
 
 Author
 
 
+--------------+--------------------------------------------+
| Author       |  and [facility]  |
|              | and [facility]                                |
+--------------+--------------------------------------------+
| Organization |  and [facility]  |
|              | and [facility]                                |
+--------------+--------------------------------------------+
| Address      | Unknown                                    |
+--------------+--------------------------------------------+
| Phone        | Unavailable                                |
+--------------+--------------------------------------------+
 
 
 
 Support
 
 
+----------------+--------------+---------------------+-----------------+
| Name           | Relationship | Address             | Phone           |
+----------------+--------------+---------------------+-----------------+
| Ada/Ed Radhames | ECON         | [address]              | [phone] |
|                |              | [place], [place]  |                 |
|                |              |  [id number]              |                 |
+----------------+--------------+---------------------+-----------------+
 
 
 
 
 Care Team Providers
 
 
+-----------------------+------+-------------+
| Care Team Member Name | Role | Phone       |
+-----------------------+------+-------------+
 PCP  | Unavailable |
+-----------------------+------+-------------+
 
 
 
 Reason for Referral
 Diagnostic/Screening (Routine)
 
+--------+--------+-----------+--------------+--------------+---------------+
| Status | Reason | Specialty | Diagnoses /  | Referred By  | Referred To   |
|        |        |           | Procedures   | Contact      | Contact       |
+--------+--------+-----------+--------------+--------------+---------------+
| Closed |        | Radiology |   Diagnoses  |   Katie,  |   [facility]    |
|        |        |           |  Coronary    | Tonia, ARNP   | [address]  |
|        |        |           | artery       | [address] |  [place], |
|        |        |           | disease      |  St  [place]   |  [place]           |
|        |        |           | involving    | [place]    | [address]    |
|        |        |           | native       | [address]        | Phone:        |
|        |        |           | coronary     | Phone:       | [phone]  |
|        |        |           | artery of    | [phone] |  Fax:         |
|        |        |           | native heart |   Fax:       | [phone]  |
|        |        |           |  without     | [phone] |               |
|        |        |           | angina       |              |               |
|        |        |           | pectoris     |              |               |
|        |        |           | Hypertension |              |               |
|        |        |           | , essential  |              |               |
|        |        |           |  Mixed       |              |               |
|        |        |           | hyperlipidem |              |               |
|        |        |           | ia  Aortic   |              |               |
|        |        |           | valve        |              |               |
|        |        |           | insufficienc |              |               |
|        |        |           | y,           |              |               |
|        |        |           | unspecified  |              |               |
|        |        |           | etiology     |              |               |
|        |        |           | Pulmonary    |              |               |
|        |        |           | hypertension |              |               |
|        |        |           |  (HCC)       |              |               |
|        |        |           | Procedures   |              |               |
|        |        |           | ECHO         |              |               |
|        |        |           | Complete  [place] |              |               |
|        |        |           |  ECHO HEART  |              |               |
|        |        |           | XTHORACIC,CO |              |               |
|        |        |           | MPLETE W     |              |               |
|        |        |           | DOPPLER  [place]  |              |               |
|        |        |           | ECHO HEART   |              |               |
|        |        |           | XTHORACIC,CO |              |               |
|        |        |           | MPLETE, W/O  |              |               |
|        |        |           | DOPPLER      |              |               |
+--------+--------+-----------+--------------+--------------+---------------+
 
 
 
 
 
 Reason for Visit
 
 
+------------------+----------+
| Reason           | Comments |
+------------------+----------+
| Follow-up        |          |
+------------------+----------+
| Coronary Artery  |          |
| Disease          |          |
+------------------+----------+
| Hypertension     |          |
+------------------+----------+
| Hyperlipidemia   |          |
+------------------+----------+
 
 
 
 Encounter Details
 
 
+--------+---------+----------------------+----------------------+----------------------+
| Date   | Type    | Department           | Care Team            | Description          |
+--------+---------+----------------------+----------------------+----------------------+
| / | Office  |   [facility]          |   Tonia Wilson,    | Coronary artery      |
| 2017   | Visit   | CARDIOLOGY  [address]    | ARNP  [address]   | disease involving    |
|        |         | [address]  [place], | [place]  [place]  | native coronary      |
|        |         |  [place] [address]       | [address]  [phone]  | artery of native     |
|        |         | [phone]         |  [phone] (Fax)  | heart without angina |
|        |         |                      |                      |  pectoris (Primary   |
|        |         |                      |                      | Dx); Hypertension,   |
|        |         |                      |                      | essential; Mixed     |
|        |         |                      |                      | hyperlipidemia;      |
|        |         |                      |                      | Aortic valve         |
|        |         |                      |                      | insufficiency,       |
|        |         |                      |                      | unspecified          |
|        |         |                      |                      | etiology; Pulmonary  |
|        |         |                      |                      | hypertension (HCC)   |
+--------+---------+----------------------+----------------------+----------------------+
 
 
 
 Social History
 
 
+--------------+-------+-----------+--------+------+
| Tobacco Use  | Types | Packs/Day | Years  | Date |
|              |       |           | Used   |      |
+--------------+-------+-----------+--------+------+
| Never Smoker |       |           |        |      |
+--------------+-------+-----------+--------+------+
 
 
 
+---------------------+---+---+---+
| Smokeless Tobacco:  |   |   |   |
| Never Used          |   |   |   |
+---------------------+---+---+---+
 
 
 
 
+---------------------------------------------------------------+
| Comments: some second hand smoke exposure, but fairly minimal |
+---------------------------------------------------------------+
 
 
 
+-------------+-------------+---------+----------+
| Alcohol Use | Drinks/Week | oz/Week | Comments |
+-------------+-------------+---------+----------+
| No          |             |         |          |
+-------------+-------------+---------+----------+
 
 
 
+------------------+---------------+
| Sex Assigned at  | Date Recorded |
| Birth            |               |
+------------------+---------------+
| Not on file      |               |
+------------------+---------------+
 
 
 
+----------------+-------------+-------------+
| Job Start Date | Occupation  | Industry    |
+----------------+-------------+-------------+
| Not on file    | Not on file | Not on file |
+----------------+-------------+-------------+
 
 
 
+----------------+--------------+------------+
| Travel History | Travel Start | Travel End |
+----------------+--------------+------------+
 
 
 
+-------------------------------------+
| No recent travel history available. |
+-------------------------------------+
 documented as of this encounter
 
 Last Filed Vital Signs
 
 
+-------------------+-------------------+----------------------+----------+
| Vital Sign        | Reading           | Time Taken           | Comments |
+-------------------+-------------------+----------------------+----------+
| Blood Pressure    | 98/60             | 2017  2:06 PM  |          |
|                   |                   | PST                  |          |
+-------------------+-------------------+----------------------+----------+
| Pulse             | 62                | 2017  2:06 PM  | regular  |
|                   |                   | PST                  |          |
+-------------------+-------------------+----------------------+----------+
| Temperature       | -                 | -                    |          |
+-------------------+-------------------+----------------------+----------+
| Respiratory Rate  | 16                | 2017  2:06 PM  |          |
|                   |                   | PST                  |          |
+-------------------+-------------------+----------------------+----------+
 
| Oxygen Saturation | -                 | -                    |          |
+-------------------+-------------------+----------------------+----------+
| Inhaled Oxygen    | -                 | -                    |          |
| Concentration     |                   |                      |          |
+-------------------+-------------------+----------------------+----------+
| Weight            | 121.1 kg (267 lb) | 2017  2:06 PM  |          |
|                   |                   | PST                  |          |
+-------------------+-------------------+----------------------+----------+
| Height            | 167.6 cm (5' 6")  | 2017  2:06 PM  |          |
|                   |                   | PST                  |          |
+-------------------+-------------------+----------------------+----------+
| Body Mass Index   | 43.09             | 2017  2:06 PM  |          |
|                   |                   | PST                  |          |
+-------------------+-------------------+----------------------+----------+
 documented in this encounter
 
 Progress Notes
 Tonia Wilson ARNP - 2017  2:10 PM PSTFormatting of this note might be different fr
om the original.
    
 
 PATIENT NAME:  Viviana Ricci  
 : 1945:         AGE: 71 y.o.
  PRIMARY CARE:
 Marzena Moser DO
 CC:    
 
 OUTPATIENT FOLLOW UP VISIT
 
 Date of Service: 2017 
 
 HISTORY OF PRESENT ILLNESS:
 Viviana Ricci is a 71 y.o. female with a history of CAD post CABG x 3 in , history of 
diabetes, renal failure post a renal transplantation, morbid obesity, inactivity, hypertensi
on, hyperlipidemia, pulmonary hypertension and severe arthritis.  She is being seen today fo
r follow up coronary artery disease.
 
 She was last seen 12/08/15 at which time she had no changes in her medical regimen.  Since 
that time, she feels that she would be doing very well if "all my joints could be taken apar
t and put back together in better shape".  She has had a poor energy level since following a
 calorie restricted diet the past month.  She has not been very active. She is limited by ba
ck pain, hip pain, shoulder pain.  She uses 4 wheeled walker with seat in the home, and fron
t-wheeled walker outside of the home.  She enjoys visiting family in her spare time, and rec
ently saw her nephew act in [place], the play.  She has not had any chest pain or discomfort
 at rest or with exertion.   She has had shortness of breath with being outisde in the cold,
 but is fine inside or if it is warm outside.  She has not had any lightheadedness or dizzin
ess.  She has not noticed palpitations.  She has noticed mild swelling at ankles by the end 
of the day that is resolved in the morning, which has been stable for her.  Just on the left
 leg where she had a previous blood clot.  She sleeps on 1 and 1/2 pillows at night without 
any shortness of breath.  She sleeps with CPAP machine in place nightly with oxygen bled in 
at 2 liters per minute.
 
 MEDICAL, SURGICAL, AND PERSONAL HISTORY
 Past Medical, Surgical, Family, and Social History are reviewed in EPIC.
 
 CURRENT PROBLEMS
 Patient Active Problem List 
 Diagnosis 
   Chronic low back pain 
   Hypothyroidism 
 
   Mixed hyperlipidemia 
   Complications of transplanted kidney 
   Movement disorder 
   Diabetes mellitus type II, uncontrolled  
   Pulmonary hypertension  
   Coronary artery disease involving native coronary artery of native heart without angina
 pectoris 
   JACK on CPAP 
   Obesity hypoventilation syndrome  
   Kidney replaced by transplant 
   Secondary hyperparathyroidism  
   Dyspnea 
   FSGS (focal segmental glomerulosclerosis) 
   Murmur 
   Cardiomegaly 
   Hypertension, essential 
   PLMD (periodic limb movement disorder) 
   Chest pain 
   UTI (lower urinary tract infection) 
   Renal transplant recipient 
   Thyroid activity decreased 
   Type 2 DM with CKD stage 2 and hypertension  
 
 CURRENT MEDICATIONS
 Current Outpatient Prescriptions 
 Medication Sig Dispense Refill 
   allopurinol (ZYLOPRIM) 100 mg tablet Take 1 tablet by mouth Daily. 30 tablet 11 
   aspirin 81 mg EC tablet Take 81 mg by mouth Daily.   
   cholecalciferol (VITAMIN D-3) 2000 UNITS TABS Take 5,000 Units by mouth Every other day
.   
   cinacalcet (SENSIPAR) 30 mg tablet Take 1 tablet by mouth Daily. 30 tablet 11 
   fludrocortisone (FLORINEF) 0.1 mg tablet Take 1 tablet by mouth Every other day. 90 tab
let 4 
   furosemide (LASIX) 40 mg tablet Take 1 tablet by mouth Daily as needed. 30 tablet 5 
   glucose blood VI test strips (ONE TOUCH ULTRA TEST) strip Check blood sugar before each
 meal and as directed 100 each 12 
   insulin glargine (LANTUS) 100 units/mL injection (vial) Inject 20 Units under the skin 
every morning. 10 mL 11 
   insulin lispro (HUMALOG) 100 units/mL injection (vial) Inject subcutaneously before sara
ls according to sliding scale 10 vial 11 
   Insulin Syringe-Needle U-100 (BD INSULIN SYRINGE ULTRAFINE) 31G X 5/16" 0.5 ML MISC Use
 before meals and as directed. 100 each 11 
   levothyroxine (LEVOTHROID) 50 mcg tablet Take 1 tablet by mouth Daily. 30 tablet 11 
   lisinopril (PRINIVIL,ZESTRIL) 30 MG tablet Take 1 tablet by mouth Daily. 90 tablet 3 
   loperamide (ANTI-DIARRHEAL) 2 mg capsule Take 1 capsule by mouth 4 times daily as neede
d. 60 capsule 5 
   losartan (COZAAR) 50 mg tablet Take 1 tablet by mouth Daily. 90 tablet 3 
   magnesium oxide (MAG-OX) 400 mg tablet Take 1 tablet by mouth 2 times daily. 60 tablet 
11 
   metoprolol tartrate (LOPRESSOR) 25 mg tablet Take 1 tablet by mouth 2 times daily. 60 t
ablet 11 
   mycophenolate (CELLCEPT) 250 mg capsule TAKE (1) CAPSULE BY MOUTH THREE TIMES DAILY. 90
 capsule 11 
   omeprazole (PRILOSEC) 20 mg capsule Take one capsule by mouth once daily on an empty st
omach 90 capsule 4 
   predniSONE (DELTASONE) 5 mg tablet Take 1 tablet by mouth Daily. 90 tablet 3 
   Prenatal Multivit-Min-Fe-FA (PRENATAL VITAMINS) 0.8 MG TABS Take 0.8 mg by mouth Daily.
 30 each 11 
   Respiratory Therapy Supplies MISC Decrease CPAP to 12-18 cmH2O Diagnosis Code(s)327.23.
 Please send order to [facility]. 1 each 0 
 
   rosuvastatin (CRESTOR) 20 mg tablet Take 1 tablet by mouth nightly. 30 tablet 11 
   tacrolimus (PROGRAF) 0.5 mg capsule TAKE (1) CAPSULE TWICE DAILY WITH 1MG CAPSULE (TOTA
L DOSE = 1.5MG TWICE DAILY). 60 capsule 11 
   tacrolimus (PROGRAF) 1 mg capsule TAKE (1) CAPSULE TWICE DAILY WITH 0.5MG CAPSULE (TOTA
L DOSE = 1.5MG TWICE DAILY) 60 capsule 11 
 
 No current facility-administered medications for this visit. 
  
 
 ALLERGIES
 No Known Allergies
 
 ROS
 Review of Systems 
 Constitutional: Positive for malaise/fatigue. 
 Respiratory: Negative for shortness of breath (only in the cold).  
 Cardiovascular: Positive for leg swelling. Negative for chest pain, palpitations, orthopnea
 and PND. 
 Gastrointestinal: Negative for abdominal pain. 
 Neurological: Negative for dizziness and loss of consciousness. 
  
 
 OBJECTIVE:  
 
 PHYSICAL EXAM
 BP 98/60 mmHg | Pulse 62 | Resp 16 | Ht 1.676 m (5' 6") | Wt 121.11 kg (267 lb) | BMI 43.12
 kg/m2
 Physical Exam 
 Constitutional: She is oriented to person, place, and time. She appears well-developed and 
well-nourished. 
 Adult female in no acute distress 
 Neck: Normal carotid pulses and no JVD (Difficult to fully evaluate due to body habitus) pr
esent. Carotid bruit is not present. 
 Cardiovascular: Normal rate, regular rhythm, S1 normal, S2 normal and intact distal pulses.
  PMI is not displaced.  Exam reveals distant heart sounds (due to girth). Exam reveals no g
allop and no friction rub.  
 Murmur heard.
  Holosystolic murmur is present with a grade of 1/6 
 Pulses:
      Carotid pulses are 2+ on the right side, and 2+ on the left side.
      Posterior tibial pulses are 1+ on the right side, and 1+ on the left side. 
 Pulmonary/Chest: Effort normal and breath sounds normal. No accessory muscle usage. No resp
iratory distress. She has no wheezes. She has no rhonchi. She has no rales. 
 Abdominal: Soft. Normal appearance and normal aorta. She exhibits no abdominal bruit. There
 is no hepatosplenomegaly (difficult to fully evaluate due to body habitus). There is no ten
derness. 
 Musculoskeletal: She exhibits edema (trace at left ankle). 
 Neurological: She is alert and oriented to person, place, and time. Gait (using front wheel
ed walker ) abnormal. 
 Skin: Skin is warm and dry. No cyanosis. Nails show no clubbing. 
 Psychiatric: She has a normal mood and affect. Her mood appears not anxious. She does not e
xhibit a depressed mood. 
 
 ECG: I personally independently reviewed ECG tracing during this visit (interpreted and cherise
led by another provider):
 Results for orders placed or performed in visit on 12/08/15 
 ECG 12 lead 
 Result Value Ref Range 
  INTERPRETATION TEXT   
   Normal sinus rhythm
 
 Left axis deviation
 Abnormal ECG
 When compared with ECG of 08-DEC-2015 15:25, (Unconfirmed)
 No significant change was found
 Confirmed by ABEBE TOWNSEND MD ([id number]) on 2015 5:27:44 PM
  
   Which is compared to today's ECG 2017: Sinus rhythm, rate 76 bpm, unchanged 
 
 LAB RESULTS reviewed during visit today primarily from [facility]: 
 LIPID
 Lab Results 
 Component Value Date 
  CHOL 162 2013 
  TRIG 225 2013 
  HDL 45 2013 
  LDL 72 2013 
  LDLEX 55 2017 
  HDLEX 50.2 2017 
  TRIGEX 190* 2017 
  CHOLEX 143 2017 
 
 CHEMISTRY
 Lab Results 
 Component Value Date 
   2014 
  GLUEX 129* 2017 
   2014 
  NAEX 142 2017 
  K 5.4 2014 
  KEX 5.1 2017 
   2014 
  CLEX 110 2017 
  CO2 22 2014 
  CO2EX 19 2017 
  CALCIUM 10.4 2014 
  ALKPHOS 100 2014 
  AST 20 07/10/2013 
  ASTEX 22 2017 
  ALT 14 07/10/2013 
  ALTEX 16 2017 
  BILITOT 0.6 2014 
  CREA 1.7 07/10/2013 
  BUN 40 2014 
  EGFR 31.0 07/10/2013 
  EGFREX 37 2017 
  CREEX 1.4* 2017 
 
 HEMATOLOGY
 Lab Results 
 Component Value Date 
  WBC 4.6 07/10/2013 
  WBCEX 5.5 2017 
  HGB 11.9 07/10/2013 
  HGBEX 13.8 2017 
  HCT 35.7 07/10/2013 
  HCTEX 43.3 2017 
  * 07/10/2013 
  PLTEX 169 2017 
  
 
 
 I reviewed records from PCP for office visit on 17  regarding multiple medical problems
 including HTN at which time she was noted to have good control. 
 
 Above data and testing is reviewed this visit; testing below is historical data unless othe
rwise specified.
 ASSESSMENT:  
 
 1. Coronary artery disease:
 A.  Status post CABG x 3 in  with LIMA to the LAD, SVG to the OM1 and OM2.
             B.  A persantine sestamibi stress test on 07 revealed a medium sized, mod
erate in severity fixed defect of the anterior wall, and a small sized mild in severity fixe
d defect of the inferior wall, LVEF by gated SPECT was 66%.
  C.  Bilateral heart catheterization on 05/03/10, shows severe two vessel coronary artery d
isease, a chronic occlusion through the proximal portion of the left anterior descending art
adele and a chronic occlusion through the proximal portion of the left circumflex artery, rani
ts: open left internal mammary artery graft to the mid left anterior descending artery, open
 saphenous vein grafts to the OM1 and OM2, mildly dilated left ventricular cavity with a nor
mal left systolic function, LVEF 70%, mild to moderate pulmonary hypertension and a normal c
ardiac output, coronary circulation is right dominant, normal system pressure.
  D.  Persantine nuclear medicine stress test 14 shows a normal myocardial perfusion im
aging study with normal left ventricular size, wall thickness, LVEF by gated SPECT of 78%.  
  E.  Today she denies any chest pain. There is no signs and symptoms of overt congestive he
art failure.  She is in a class II of [employer] [place] Heart Association functional class.  There is
 trace left lower extremity edema on physical examination, which is chronic and unchanged. 
 
 2.  Right sided heart failure/ cor pulmonale / severe pulmonary hypertension:
             A. The echocardiogram from 02/19/10 revealed moderate bi-atrial dilatation and 
normal left ventricular size, wall thickness and motion, LVEF is 55-60%, dilated right ventr
icle with moderate hypo-kinesis, moderate tricuspid valve regurgitation, severe pulmonary hy
pertension with a peak systolic pressure of 80 mmHg, and a small pericardial effusion. 
  B. Echocardiogram from 4/15/14 showed Mild left atrial dilatation. Normal left ventricular
 size, wall thickness and motion. Preserved  left ventricular systolic function. LVEF is 70-
75%. Grade 1 left ventricular diastole dysfunction. Mild tricuspid valve regurgitation. Mild
 thickened trileaflet aortic valve with adequate opening. A mild aortic valve insufficiency.
 Normal right-sided pressure.
  C.  Will recheck echocardiogram prior to her next visit.
 
 3. Hypertension, essential:
  A.  Today her blood pressure is well-controlled, on low end of normal range, but she has n
o symptoms of orthostasis.
 
 4.  Hyperlipidemia, mixed.
  A.  On rosuvastatin 20 mg.
 
 5.  Obstructive sleep apnea:
  A.  She uses a CPAP machine at night with 2 L of oxygen bled in.
 
 6.  Morbid obesity.
  A.  Body mass index is 43.12 kg/(m^2). She has been working on calorie restricted diet, an
d has lost a little weight this past month.
 
 7. Type II diabetes. Not otherwise addressed today. 
 
 8. Chronic kidney disease:
  A.  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 Followed by Dr. Moser.
 
 9.  DVT left leg 2015:
  A.  She took a one year course of apixaban. Now on aspirin alone.
 
 
 10.  Hypothyroidism. Not otherwise addressed today. 
 
 PLAN:  
 
 1. She will continue with her current medical regimen.  
 2. She will follow up in 1 year for office visit, or sooner with concerns.  She will have a
n ECG at her follow up visit, She will have echocardiogram done prior to visit to follow up 
her pulmonary hypertension. and She will have fasting labs prior to visit for lipid profile,
 CMP and CBC, if not done prior by another provider.
 
 Electronically signed by: 
 BELKIS Arredondo on 2017 
 
 Portions of this chart may have been created with Dragon voice recognition software. Occasi
onal wrong-word or   sound-alike  substitutions may have occurred due to the inherent naqvi
itations of voice recognition software. Please read the chart carefully and recognize, using
 context, where these substitutions have occurred.Electronically signed by NATHANIEL Arredondo NP at 2017  3:08 PM PSTdocumented in this encounter
 
 Plan of Treatment
 
 
+--------+-----------+------------+----------------------+-------------------+
| Date   | Type      | Specialty  | Care Team            | Description       |
+--------+-----------+------------+----------------------+-------------------+
| / | Office    | Cardiology |   Tonia Wilson,    |                   |
|    | Visit     |            | ARNJESSICA  [address]   |                   |
|        |           |            | St  [place], [place]  |                   |
|        |           |            | [address]  [phone]  |                   |
|        |           |            |  [phone] (Fax)  |                   |
+--------+-----------+------------+----------------------+-------------------+
| / | Hospital  | Radiology  |   Abebe Townsend, |                   |
|    | Encounter |            |  MD  [address] |                   |
|        |           |            |  [place],   |                   |
|        |           |            | [place] [address]             |                   |
|        |           |            | [phone]         |                   |
|        |           |            | [phone] (Fax)   |                   |
+--------+-----------+------------+----------------------+-------------------+
| / | Surgery   | Radiology  |   Abebe Townsend, | CV EP PPM SYSTEM  |
|    |           |            |  MD  [address] | IMPLANT           |
|        |           |            |  [place],   |                   |
|        |           |            | [place] [address]             |                   |
|        |           |            | [phone]         |                   |
|        |           |            | [phone] (Fax)   |                   |
+--------+-----------+------------+----------------------+-------------------+
| 02/10/ | Clinical  | Cardiology |                      |                   |
|    | Support   |            |                      |                   |
+--------+-----------+------------+----------------------+-------------------+
| / | Office    | Cardiology |   Tonia Wilson,    |                   |
|    | Visit     |            | [facility]  [address]   |                   |
|        |           |            |   [place]  |                   |
|        |           |            | [address]  [phone]  |                   |
|        |           |            |  [phone] (Fax)  |                   |
+--------+-----------+------------+----------------------+-------------------+
| / | Off-Site  | Nephrology |   Marzena Moser  |                   |
|    | Visit     |            | DO ETIENNE  [address] [place] [address]      |                   |
|        |           |            | [address]      |                   |
|        |           |            | [place]      |                   |
|        |           |            | [address]  [phone]  |                   |
|        |           |            |  [phone] (Fax)  |                   |
 
+--------+-----------+------------+----------------------+-------------------+
 documented as of this encounter
 
 Procedures
 
 
+----------------+--------+-------------+----------------------+----------------------+
| Procedure Name | Priori | Date/Time   | Associated Diagnosis | Comments             |
|                | ty     |             |                      |                      |
+----------------+--------+-------------+----------------------+----------------------+
| ECG 12 LEAD    | Routin | 2017  |   Coronary artery    |   Results for this   |
|                | e      |  2:19 PM    | disease involving    | procedure are in the |
|                |        | PST         | native coronary      |  results section.    |
|                |        |             | artery of native     |                      |
|                |        |             | heart without angina |                      |
|                |        |             |  pectoris            |                      |
+----------------+--------+-------------+----------------------+----------------------+
 documented in this encounter
 
 Results
 ECHO Complete (2018 12:58 PM PDT)
 
+-------------+--------+-----------+-------------+--------------+
| Component   | Value  | Ref Range | Performed   | Pathologist  |
|             |        |           | At          | Signature    |
+-------------+--------+-----------+-------------+--------------+
| LVEF-TTE    | 60     | %         | [facility] |              |
| TRANSTHORAC |        |           |             |              |
| IC ECHO     |        |           |             |              |
+-------------+--------+-----------+-------------+--------------+
| Patient     | 270lb  |           | [facility] |              |
| Weight      |        |           |             |              |
| (lbs)       |        |           |             |              |
+-------------+--------+-----------+-------------+--------------+
| Patient     | 5f6in  |           | [facility] |              |
| Height      |        |           |             |              |
+-------------+--------+-----------+-------------+--------------+
| LVIDd       | 5.01   | cm        | [facility] |              |
+-------------+--------+-----------+-------------+--------------+
| FS          | 34     | %         | [facility] |              |
+-------------+--------+-----------+-------------+--------------+
| LA volume   | 106.4  | mL        | [facility] |              |
+-------------+--------+-----------+-------------+--------------+
| MV Area by  | 3.27   | cm2       | [facility] |              |
| P 1/2       |        |           |             |              |
| method      |        |           |             |              |
+-------------+--------+-----------+-------------+--------------+
| IVRT        | 131.49 | msec      | [facility] |              |
+-------------+--------+-----------+-------------+--------------+
| LVOT        | 2.38   | cm        | [facility] |              |
| diameter    |        |           |             |              |
+-------------+--------+-----------+-------------+--------------+
| LVOT peak   | 68.43  | cm/s      | [facility] |              |
| travon         |        |           |             |              |
+-------------+--------+-----------+-------------+--------------+
| AV peak travon | 149.09 | cm/s      | [facility] |              |
+-------------+--------+-----------+-------------+--------------+
| AV peak     | 8.89   | mmHg      | [facility] |              |
| gradient    |        |           |             |              |
+-------------+--------+-----------+-------------+--------------+
 
| MV peak     | 2.62   | mmHg      | [facility] |              |
| gradient    |        |           |             |              |
+-------------+--------+-----------+-------------+--------------+
| MV Pressure | 67.22  | msec      | [facility] |              |
|  1/2 time   |        |           |             |              |
+-------------+--------+-----------+-------------+--------------+
| LA Volume   | 47     | mL/m2     | [facility] |              |
| Index       |        |           |             |              |
+-------------+--------+-----------+-------------+--------------+
| AV LVOT     | 1.87   | mmHg      | [facility] |              |
| Peak        |        |           |             |              |
| Gradient    |        |           |             |              |
+-------------+--------+-----------+-------------+--------------+
| LV          | 7.01   | cm        | [facility] |              |
| Diastolic   |        |           |             |              |
| Length 4C   |        |           |             |              |
+-------------+--------+-----------+-------------+--------------+
| LV          | 60     | %         | [facility] |              |
| Moran's   |        |           |             |              |
| Biplane EF  |        |           |             |              |
+-------------+--------+-----------+-------------+--------------+
| LV ED       | 86.24  | ml        | [facility] |              |
| Volume      |        |           |             |              |
| (Moran's) |        |           |             |              |
+-------------+--------+-----------+-------------+--------------+
| LV ED       | 38     | ml/m2     | [facility] |              |
| Volume      |        |           |             |              |
| Index       |        |           |             |              |
+-------------+--------+-----------+-------------+--------------+
| LV ES       | 37.52  | ml        | [facility] |              |
| Volume      |        |           |             |              |
+-------------+--------+-----------+-------------+--------------+
| MV E'       | 5      | cm/s      | [facility] |              |
| Septal      |        |           |             |              |
| Velocity    |        |           |             |              |
+-------------+--------+-----------+-------------+--------------+
| MV          | 349.23 | cm/s2     | [facility] |              |
| Deceleratio |        |           |             |              |
| n [place]     |        |           |             |              |
+-------------+--------+-----------+-------------+--------------+
| MV          | 231.78 | msec      | [facility] |              |
| Deceleratio |        |           |             |              |
| n Time      |        |           |             |              |
+-------------+--------+-----------+-------------+--------------+
| MV E/A      | 1.24   |           | [facility] |              |
| Ratio       |        |           |             |              |
+-------------+--------+-----------+-------------+--------------+
| MV Peak     | 65.42  | cm/s      | [facility] |              |
| A-Wave      |        |           |             |              |
+-------------+--------+-----------+-------------+--------------+
| MV Peak     | 80.95  | cm/s      | [facility] |              |
| E-Wave      |        |           |             |              |
+-------------+--------+-----------+-------------+--------------+
| LA/Aorta    | 1.04   |           | [facility] |              |
| Ratio       |        |           |             |              |
+-------------+--------+-----------+-------------+--------------+
| MV E/E      | 16.19  |           | [facility] |              |
| SEPTAL      |        |           |             |              |
+-------------+--------+-----------+-------------+--------------+
| LV ES       | 17     | ml/m2     | [facility] |              |
 
| Volume      |        |           |             |              |
| Index       |        |           |             |              |
+-------------+--------+-----------+-------------+--------------+
| Heart Rate  | 62     |           | [facility] |              |
+-------------+--------+-----------+-------------+--------------+
| Aortic Root | 3.9    | cm        | [facility] |              |
|  Diameter   |        |           |             |              |
+-------------+--------+-----------+-------------+--------------+
| IVS         | 1.26   | cm        | [facility] |              |
| Diastolic   |        |           |             |              |
| Thickness   |        |           |             |              |
| MM          |        |           |             |              |
+-------------+--------+-----------+-------------+--------------+
| LVPW        | 1.34   | cm        | [facility] |              |
| Diastolic   |        |           |             |              |
| Thickness   |        |           |             |              |
| MM          |        |           |             |              |
+-------------+--------+-----------+-------------+--------------+
| LV Systolic | 3.31   | cm        | [facility] |              |
|  Diameter   |        |           |             |              |
| MM          |        |           |             |              |
+-------------+--------+-----------+-------------+--------------+
| AV Cusp     | 1.13   | cm        | [facility] |              |
| Seperation  |        |           |             |              |
| MM          |        |           |             |              |
+-------------+--------+-----------+-------------+--------------+
| LA Systolic | 4.06   | cm        | [facility] |              |
|  Diameter   |        |           |             |              |
| MM          |        |           |             |              |
+-------------+--------+-----------+-------------+--------------+
| TAPSE       | 1.8    | cm        | [facility] |              |
+-------------+--------+-----------+-------------+--------------+
| RA PRESSURE | 3      | mmHg      | [facility] |              |
+-------------+--------+-----------+-------------+--------------+
 
 
 
+----------+
| Specimen |
+----------+
|          |
+----------+
 
 
 
+---------------------------------------------------------------------------+---------------
+
| Narrative                                                                 | Performed At  
|
+---------------------------------------------------------------------------+---------------
+
|   This result has an attachment that is not available.  1.   Moderate     |   [facility] 
|
| left atrial dilatation.2.   Normal left ventricular size with a mild      |               
|
| concentric left ventricular hypertrophy.   Left ventricular systolic      |               
|
| dysfunction is preserved.   LVEF is 60-65%.3.   Mild aortic root          |               
|
| dilatation measuring 3.9 cm in diameter.4.   Mildly thickened and         |               
 
|
| calcified trileaflet aortic valve with adequate opening.   There is       |               
|
| aortic valve sclerosis without significant aortic valve stenosis.5.       |               
|
|   Mildly thickened and calcified mitral valve with a mild mitral valve    |               
|
|  regurgitation.6.   Mild mitral annular calcification.   7.   Normal      |               
|
| right-sided pressure.8.   Normal IVC with normal respiratory              |               
|
| collapse.9.   When compared to echocardiography on 4/15/14, no            |               
|
| significant changes.                                                      |               
|
|8.   Normal IVC with normal respiratory collapse.                          |               
|
|9.   When compared to echocardiography on 4/15/14, no significant changes. |               
|
+---------------------------------------------------------------------------+---------------
+
 
 
 
+---------------+---------+--------------------+--------------+
| Performing    | Address | City/State/Zipcode | Phone Number |
| Organization  |         |                    |              |
+---------------+---------+--------------------+--------------+
|   [facility] |         |                    |              |
+---------------+---------+--------------------+--------------+
 ECG 12 lead (2017  2:19 PM PST)
 
+-------------+--------------------------+-----------+------------+--------------+
| Component   | Value                    | Ref Range | Performed  | Pathologist  |
|             |                          |           | At         | Signature    |
+-------------+--------------------------+-----------+------------+--------------+
| VENTRICULAR | 76                       | BPM       | [facility]  |              |
|  RATE EKG   |                          |           |            |              |
+-------------+--------------------------+-----------+------------+--------------+
| ATRIAL RATE | 76                       | BPM       | [facility]  |              |
+-------------+--------------------------+-----------+------------+--------------+
| P-R         | 218                      | ms        | [facility]  |              |
| INTERVAL    |                          |           |            |              |
+-------------+--------------------------+-----------+------------+--------------+
| QRS         | 86                       | ms        | [facility]  |              |
| DURATION    |                          |           |            |              |
+-------------+--------------------------+-----------+------------+--------------+
| Q-T         | 394                      | ms        | [facility]  |              |
| INTERVAL    |                          |           |            |              |
+-------------+--------------------------+-----------+------------+--------------+
| Q-T         | 443                      | ms        | [facility]  |              |
| INTERVAL    |                          |           |            |              |
| (CORRECTED) |                          |           |            |              |
+-------------+--------------------------+-----------+------------+--------------+
| P WAVE AXIS | 41                       | degrees   | [facility]  |              |
+-------------+--------------------------+-----------+------------+--------------+
| QRS AXIS    | -50                      | degrees   | [facility]  |              |
+-------------+--------------------------+-----------+------------+--------------+
| T AXIS      | 57                       | degrees   | [facility]  |              |
+-------------+--------------------------+-----------+------------+--------------+
 
| INTERPRETAT | Sinus rhythm with 1st    |           | [facility]  |              |
| ION TEXT    | degree AV blockLeft axis |           |            |              |
|             |  deviationAbnormal       |           |            |              |
|             | ECGWhen compared with    |           |            |              |
|             | ECG of 08-DEC-2015       |           |            |              |
|             | 15:26,No significant     |           |            |              |
|             | change was               |           |            |              |
|             | foundConfirmed by        |           |            |              |
|             | ABEBE TOWNSEND MD     |           |            |              |
|             | ([id number]) on 2017     |           |            |              |
|             | 4:11:18 PM               |           |            |              |
+-------------+--------------------------+-----------+------------+--------------+
 
 
 
+----------+
| Specimen |
+----------+
|          |
+----------+
 
 
 
+----------------------------------------------------------+--------------+
| Narrative                                                | Performed At |
+----------------------------------------------------------+--------------+
|   This result has an attachment that is not available.   |              |
+----------------------------------------------------------+--------------+
 
 
 
+--------------+---------+--------------------+--------------+
| Performing   | Address | City/State/Zipcode | Phone Number |
| Organization |         |                    |              |
+--------------+---------+--------------------+--------------+
|   [facility]  |         |                    |              |
+--------------+---------+--------------------+--------------+
 documented in this encounter
 
 Visit Diagnoses
 
 
+-------------------------------------------------------------------------------------+
| Diagnosis                                                                           |
+-------------------------------------------------------------------------------------+
|   Coronary artery disease involving native coronary artery of native heart without  |
| angina pectoris - Primary                                                           |
+-------------------------------------------------------------------------------------+
|   Hypertension, essential  Unspecified essential hypertension                       |
+-------------------------------------------------------------------------------------+
|   Mixed hyperlipidemia                                                              |
+-------------------------------------------------------------------------------------+
|   Aortic valve insufficiency, unspecified etiology                                  |
+-------------------------------------------------------------------------------------+
|   Pulmonary hypertension (HCC)  Other chronic pulmonary heart diseases              |
+-------------------------------------------------------------------------------------+
 documented in this encounter

## 2020-01-08 NOTE — XMS
Encounter Summary
  Created on: 2020
 
 Viviana Ricci
 External Reference #: [id number]
 : 47
 Sex: Female
 
 Demographics
 
 
+-----------------------+----------------------+
| Address               | [address]      |
|                       | [place]  [address] |
+-----------------------+----------------------+
| Home Phone            | [phone]      |
+-----------------------+----------------------+
| Preferred Language    | Unknown              |
+-----------------------+----------------------+
| Marital Status        | Single               |
+-----------------------+----------------------+
| Baptism Affiliation | [id number]                 |
+-----------------------+----------------------+
| Race                  | Unknown              |
+-----------------------+----------------------+
| Ethnic Group          | Unknown              |
+-----------------------+----------------------+
 
 
 Author
 
 
+--------------+--------------------------------------------+
| Author       | [facility]  |
|              | and [place]                                |
+--------------+--------------------------------------------+
| Organization | [facility]  |
|              | and [place]                                |
+--------------+--------------------------------------------+
| Address      | Unknown                                    |
+--------------+--------------------------------------------+
| Phone        | Unavailable                                |
+--------------+--------------------------------------------+
 
 
 
 Support
 
 
+----------------+--------------+---------------------+-----------------+
| Name           | Relationship | Address             | Phone           |
+----------------+--------------+---------------------+-----------------+
| Ada/Ed Radhames | ECON         | [address]              | [phone] |
|                |              | [place]  |                 |
|                |              |  [address]              |                 |
+----------------+--------------+---------------------+-----------------+
 
 
 
 
 Care Team Providers
 
 
+------------------------+------+-----------------+
| Care Team Member Name  | Role | Phone           |
+------------------------+------+-----------------+
| Marzena Moser DO | PCP  | [phone] |
+------------------------+------+-----------------+
 
 
 
 Reason for Visit
 
 
+-------------------+----------+
| Reason            | Comments |
+-------------------+----------+
| Medication Refill |          |
+-------------------+----------+
 
 
 
 Encounter Details
 
 
+--------+--------+---------------------+----------------------+-------------------+
| Date   | Type   | Department          | Care Team            | Description       |
+--------+--------+---------------------+----------------------+-------------------+
| / | Refill |   [facility]         |   Marzena Moser  | Medication Refill |
| 2018   |        | NEPHROLOGY  [address]   | M, DO  [address]      |                   |
|        |        | [address]   | [address]      |                   |
|        |        | [place], [place]     | [place], [place]      |                   |
|        |        | [address]          | [address]  [phone]  |                   |
|        |        | [phone]        |  [phone] (Fax)  |                   |
+--------+--------+---------------------+----------------------+-------------------+
 
 
 
 Social History
 
 
+--------------+-------+-----------+--------+------+
| Tobacco Use  | Types | Packs/Day | Years  | Date |
|              |       |           | Used   |      |
+--------------+-------+-----------+--------+------+
| Never Smoker |       |           |        |      |
+--------------+-------+-----------+--------+------+
 
 
 
+---------------------+---+---+---+
| Smokeless Tobacco:  |   |   |   |
| Never Used          |   |   |   |
+---------------------+---+---+---+
 
 
 
+---------------------------------------------------------------+
| Comments: some second hand smoke exposure, but fairly minimal |
 
+---------------------------------------------------------------+
 
 
 
+-------------+-------------+---------+----------+
| Alcohol Use | Drinks/Week | oz/Week | Comments |
+-------------+-------------+---------+----------+
| No          |             |         |          |
+-------------+-------------+---------+----------+
 
 
 
+------------------+---------------+
| Sex Assigned at  | Date Recorded |
| Birth            |               |
+------------------+---------------+
| Not on file      |               |
+------------------+---------------+
 
 
 
+----------------+-------------+-------------+
| Job Start Date | Occupation  | Industry    |
+----------------+-------------+-------------+
| Not on file    | Not on file | Not on file |
+----------------+-------------+-------------+
 
 
 
+----------------+--------------+------------+
| Travel History | Travel Start | Travel End |
+----------------+--------------+------------+
 
 
 
+-------------------------------------+
| No recent travel history available. |
+-------------------------------------+
 documented as of this encounter
 
 Plan of Treatment
 
 
+--------+-----------+------------+----------------------+-------------------+
| Date   | Type      | Specialty  | Care Team            | Description       |
+--------+-----------+------------+----------------------+-------------------+
| / | Office    | Cardiology |   Tonia Wilson,    |                   |
|    | Visit     |            | ARNP  [address]   |                   |
|        |           |            | St [place], [place]  |                   |
|        |           |            | [address]  [phone]  |                   |
|        |           |            |  [phone] (Fax)  |                   |
+--------+-----------+------------+----------------------+-------------------+
| / | Hospital  | Radiology  |   Popeye Townsend, |                   |
|    | Encounter |            |  MD  [address] |                   |
|        |           |            |  [place],   |                   |
|        |           |            | [place] [address]             |                   |
|        |           |            | [phone]         |                   |
|        |           |            | [phone] (Fax)   |                   |
+--------+-----------+------------+----------------------+-------------------+
| / | Surgery   | Radiology  |   Popeye Townsend, | CV EP PPM SYSTEM  |
 
|    |           |            |  MD  [address] | IMPLANT           |
|        |           |            |  [place],   |                   |
|        |           |            | [place] [address]             |                   |
|        |           |            | [phone]         |                   |
|        |           |            | [phone] (Fax)   |                   |
+--------+-----------+------------+----------------------+-------------------+
| 02/10/ | Clinical  | Cardiology |                      |                   |
|    | Support   |            |                      |                   |
+--------+-----------+------------+----------------------+-------------------+
| / | Office    | Cardiology |   Tonia Wilson,    |                   |
|    | Visit     |            | ARNP  [address]   |                   |
|        |           |            | [place]  |                   |
|        |           |            | [id number] [phone]  |                   |
|        |           |            |  [phone] (Fax)  |                   |
+--------+-----------+------------+----------------------+-------------------+
| / | Off-Site  | Nephrology |   Marzena Moser  |                   |
|    | Visit     |            | DO ETIENNE  [address] [place] [address]      |                   |
|        |           |            | [address]      |                   |
|        |           |            | [place]      |                   |
|        |           |            | [address] [phone]  |                   |
|        |           |            |  [phone] (Fax)  |                   |
+--------+-----------+------------+----------------------+-------------------+
 documented as of this encounter
 
 Visit Diagnoses
 
 
+-----------------------------------------------------------------------------------------+
| Diagnosis                                                                               |
+-----------------------------------------------------------------------------------------+
|   Edema, unspecified type                                                               |
+-----------------------------------------------------------------------------------------+
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
| membranous glomerulonephritis                                                           |
+-----------------------------------------------------------------------------------------+
|   Hyperlipidemia  Other and unspecified hyperlipidemia                                  |
+-----------------------------------------------------------------------------------------+
 documented in this encounter

## 2020-01-08 NOTE — XMS
Encounter Summary
  Created on: 2020
 
 Viviana Ricci
 External Reference #: [id number]
 : 47
 Sex: Female
 
 Demographics
 
 
+-----------------------+----------------------+
| Address               | [address]      |
|                       | [place]  [address] |
+-----------------------+----------------------+
| Home Phone            | [phone]      |
+-----------------------+----------------------+
| Preferred Language    | Unknown              |
+-----------------------+----------------------+
| Marital Status        | Single               |
+-----------------------+----------------------+
| Jewish Affiliation | 1009                 |
+-----------------------+----------------------+
| Race                  | Unknown              |
+-----------------------+----------------------+
| Ethnic Group          | Unknown              |
+-----------------------+----------------------+
 
 
 Author
 
 
+--------------+--------------------------------------------+
| Author       | [facility]  |
|              | and [place]                                |
+--------------+--------------------------------------------+
| Organization | [facility]  |
|              | and [place]                                |
+--------------+--------------------------------------------+
| Address      | Unknown                                    |
+--------------+--------------------------------------------+
| Phone        | Unavailable                                |
+--------------+--------------------------------------------+
 
 
 
 Support
 
 
+----------------+--------------+---------------------+-----------------+
| Name           | Relationship | Address             | Phone           |
+----------------+--------------+---------------------+-----------------+
| Ada/Ed Radhames | ECON         | [address]              | [phone] |
|                |              | [place]  |                 |
|                |              |  [address]              |                 |
+----------------+--------------+---------------------+-----------------+
 
 
 
 
 Care Team Providers
 
 
+-----------------------+------+-------------+
| Care Team Member Name | Role | Phone       |
+-----------------------+------+-------------+
 PCP  | Unavailable |
+-----------------------+------+-------------+
 
 
 
 Reason for Visit
 
 
+-------------------+----------+
| Reason            | Comments |
+-------------------+----------+
| Medication Refill |          |
+-------------------+----------+
 
 
 
 Encounter Details
 
 
+--------+--------+---------------------+----------------------+-------------------+
| Date   | Type   | Department          | Care Team            | Description       |
+--------+--------+---------------------+----------------------+-------------------+
| / | Refill |   [place]         |   Marzena Moser  | Medication Refill |
|    |        | NEPHROLOGY  [address]   | M, DO  [address]      |                   |
|        |        | [address]   | [address]      |                   |
|        |        | [place], [place]     | [place], [place]      |                   |
|        |        | [address]          | [address]  [phone]  |                   |
|        |        | [phone]        |  [phone] (Fax)  |                   |
+--------+--------+---------------------+----------------------+-------------------+
 
 
 
 Social History
 
 
+--------------+-------+-----------+--------+------+
| Tobacco Use  | Types | Packs/Day | Years  | Date |
|              |       |           | Used   |      |
+--------------+-------+-----------+--------+------+
| Never Smoker |       |           |        |      |
+--------------+-------+-----------+--------+------+
 
 
 
+---------------------+---+---+---+
| Smokeless Tobacco:  |   |   |   |
| Never Used          |   |   |   |
+---------------------+---+---+---+
 
 
 
+---------------------------------------------------------------+
| Comments: some second hand smoke exposure, but fairly minimal |
 
+---------------------------------------------------------------+
 
 
 
+-------------+-------------+---------+----------+
| Alcohol Use | Drinks/Week | oz/Week | Comments |
+-------------+-------------+---------+----------+
| No          |             |         |          |
+-------------+-------------+---------+----------+
 
 
 
+------------------+---------------+
| Sex Assigned at  | Date Recorded |
| Birth            |               |
+------------------+---------------+
| Not on file      |               |
+------------------+---------------+
 
 
 
+----------------+-------------+-------------+
| Job Start Date | Occupation  | Industry    |
+----------------+-------------+-------------+
| Not on file    | Not on file | Not on file |
+----------------+-------------+-------------+
 
 
 
+----------------+--------------+------------+
| Travel History | Travel Start | Travel End |
+----------------+--------------+------------+
 
 
 
+-------------------------------------+
| No recent travel history available. |
+-------------------------------------+
 documented as of this encounter
 
 Plan of Treatment
 
 
+--------+-----------+------------+----------------------+-------------------+
| Date   | Type      | Specialty  | Care Team            | Description       |
+--------+-----------+------------+----------------------+-------------------+
| / | Office    | Cardiology |   HellalfredoTonia,    |                   |
|    | Visit     |            | ARNP  [address]   |                   |
|        |           |            | St  [place], [place]  |                   |
|        |           |            | [address]  [phone]  |                   |
|        |           |            |  [phone] (Fax)  |                   |
+--------+-----------+------------+----------------------+-------------------+
| / | Hospital  | Radiology  |   Popeye Townsend, |                   |
|    | Encounter |            |  MD  [address] |                   |
|        |           |            |  St.  [place],   |                   |
|        |           |            | [place] [address]             |                   |
|        |           |            | [phone]         |                   |
|        |           |            | [phone] (Fax)   |                   |
+--------+-----------+------------+----------------------+-------------------+
| / | Surgery   | Radiology  |   Popeye Townsend, | CV EP PPM SYSTEM  |
 
|    |           |            |  MD  [address] | IMPLANT           |
|        |           |            |  St.  [place],   |                   |
|        |           |            | [place] [address]             |                   |
|        |           |            | [phone]         |                   |
|        |           |            | [phone] (Fax)   |                   |
+--------+-----------+------------+----------------------+-------------------+
| 02/10/ | Clinical  | Cardiology |                      |                   |
|    | Support   |            |                      |                   |
+--------+-----------+------------+----------------------+-------------------+
| / | Office    | Cardiology |   Tonia Wilson,    |                   |
|    | Visit     |            | ARNP  [address]   |                   |
|        |           |            | [place]  |                   |
|        |           |            | [address]  [phone]  |                   |
|        |           |            |  [phone] (Fax)  |                   |
+--------+-----------+------------+----------------------+-------------------+
| / | Off-Site  | Nephrology |   Marzena Moser  |                   |
|    | Visit     |            | DO ETIENNE  [address] [place] [address]      |                   |
|        |           |            | [address]      |                   |
|        |           |            | [place]      |                   |
|        |           |            | [address]  [phone]  |                   |
|        |           |            |  [phone] (Fax)  |                   |
+--------+-----------+------------+----------------------+-------------------+
 documented as of this encounter
 
 Visit Diagnoses
 Not on filedocumented in this encounter

## 2020-01-08 NOTE — XMS
Encounter Summary
  Created on: 2020
 
 Viviana Ricci
 External Reference #: [id number]
 : 47
 Sex: Female
 
 Demographics
 
 
+-----------------------+----------------------+
| Address               | [address]      |
|                       | [place]  [address] |
+-----------------------+----------------------+
| Home Phone            | [phone]      |
+-----------------------+----------------------+
| Preferred Language    | Unknown              |
+-----------------------+----------------------+
| Marital Status        | Single               |
+-----------------------+----------------------+
| Yazidi Affiliation | [id number]                 |
+-----------------------+----------------------+
| Race                  | Unknown              |
+-----------------------+----------------------+
| Ethnic Group          | Unknown              |
+-----------------------+----------------------+
 
 
 Author
 
 
+--------------+--------------------------------------------+
| Author       | [facility]  |
|              | and [place]                                |
+--------------+--------------------------------------------+
| Organization | [facility]  |
|              | and [place]                                |
+--------------+--------------------------------------------+
| Address      | Unknown                                    |
+--------------+--------------------------------------------+
| Phone        | Unavailable                                |
+--------------+--------------------------------------------+
 
 
 
 Support
 
 
+----------------+--------------+---------------------+-----------------+
| Name           | Relationship | Address             | Phone           |
+----------------+--------------+---------------------+-----------------+
| Ada/Ed Radhames | ECON         | [place]              | [phone] |
|                |              | [place]  |                 |
|                |              |  [address]              |                 |
+----------------+--------------+---------------------+-----------------+
 
 
 
 
 Care Team Providers
 
 
+-----------------------+------+-------------+
| Care Team Member Name | Role | Phone       |
+-----------------------+------+-------------+
 PCP  | Unavailable |
+-----------------------+------+-------------+
 
 
 
 Reason for Visit
 
 
+-------------------+----------+
| Reason            | Comments |
+-------------------+----------+
| Medication Refill |          |
+-------------------+----------+
 
 
 
 Encounter Details
 
 
+--------+--------+---------------------+----------------------+-------------------+
| Date   | Type   | Department          | Care Team            | Description       |
+--------+--------+---------------------+----------------------+-------------------+
| / | Refill |   [facility]         |   Marzena Moser  | Medication Refill |
| 2016   |        | NEPHROLOGY  [address]   | M, DO  [address]      |                   |
|        |        | [address]   | [address]      |                   |
|        |        | [place], [place]     | [place], [place]      |                   |
|        |        | [address]          | [address]  [phone]  |                   |
|        |        | [phone]        |  [phone] (Fax)  |                   |
+--------+--------+---------------------+----------------------+-------------------+
 
 
 
 Social History
 
 
+--------------+-------+-----------+--------+------+
| Tobacco Use  | Types | Packs/Day | Years  | Date |
|              |       |           | Used   |      |
+--------------+-------+-----------+--------+------+
| Never Smoker |       |           |        |      |
+--------------+-------+-----------+--------+------+
 
 
 
+---------------------+---+---+---+
| Smokeless Tobacco:  |   |   |   |
| Never Used          |   |   |   |
+---------------------+---+---+---+
 
 
 
+---------------------------------------------------------------+
| Comments: some second hand smoke exposure, but fairly minimal |
 
+---------------------------------------------------------------+
 
 
 
+-------------+-------------+---------+----------+
| Alcohol Use | Drinks/Week | oz/Week | Comments |
+-------------+-------------+---------+----------+
| No          |             |         |          |
+-------------+-------------+---------+----------+
 
 
 
+------------------+---------------+
| Sex Assigned at  | Date Recorded |
| Birth            |               |
+------------------+---------------+
| Not on file      |               |
+------------------+---------------+
 
 
 
+----------------+-------------+-------------+
| Job Start Date | Occupation  | Industry    |
+----------------+-------------+-------------+
| Not on file    | Not on file | Not on file |
+----------------+-------------+-------------+
 
 
 
+----------------+--------------+------------+
| Travel History | Travel Start | Travel End |
+----------------+--------------+------------+
 
 
 
+-------------------------------------+
| No recent travel history available. |
+-------------------------------------+
 documented as of this encounter
 
 Plan of Treatment
 
 
+--------+-----------+------------+----------------------+-------------------+
| Date   | Type      | Specialty  | Care Team            | Description       |
+--------+-----------+------------+----------------------+-------------------+
| / | Office    | Cardiology |   HellalfredoTonia,    |                   |
|    | Visit     |            | ARNP  [address]   |                   |
|        |           |            | St  [place], [place]  |                   |
|        |           |            | [address]  [phone]  |                   |
|        |           |            |  [phone] (Fax)  |                   |
+--------+-----------+------------+----------------------+-------------------+
| / | Hospital  | Radiology  |   Popeye Townsend, |                   |
|    | Encounter |            |  MD  [address] |                   |
|        |           |            |  St.  [place],   |                   |
|        |           |            | [place] [address]             |                   |
|        |           |            | [phone]         |                   |
|        |           |            | [phone] (Fax)   |                   |
+--------+-----------+------------+----------------------+-------------------+
| / | Surgery   | Radiology  |   Popeye Townsend, | CV EP PPM SYSTEM  |
 
|    |           |            |  MD  [address] | IMPLANT           |
|        |           |            |  St.  [place],   |                   |
|        |           |            | [place] [address]             |                   |
|        |           |            | [phone]         |                   |
|        |           |            | [phone] (Fax)   |                   |
+--------+-----------+------------+----------------------+-------------------+
| 02/10/ | Clinical  | Cardiology |                      |                   |
|    | Support   |            |                      |                   |
+--------+-----------+------------+----------------------+-------------------+
| / | Office    | Cardiology |   Tonia Wilson,    |                   |
|    | Visit     |            | ARNP  [address]   |                   |
|        |           |            | [place]  |                   |
|        |           |            | [address]  [phone]  |                   |
|        |           |            |  [phone] (Fax)  |                   |
+--------+-----------+------------+----------------------+-------------------+
| / | Off-Site  | Nephrology |   Marzena Moser  |                   |
|    | Visit     |            | DO ETIENNE  [address] [place] [address]      |                   |
|        |           |            | [address]      |                   |
|        |           |            | [place]      |                   |
|        |           |            | [address]  [phone]  |                   |
|        |           |            |  [phone] (Fax)  |                   |
+--------+-----------+------------+----------------------+-------------------+
 documented as of this encounter
 
 Visit Diagnoses
 Not on filedocumented in this encounter

## 2020-01-08 NOTE — XMS
Encounter Summary
  Created on: 2020
 
 Viviana Ricci
 External Reference #: [id number]
 : 47
 Sex: Female
 
 Demographics
 
 
+-----------------------+----------------------+
| Address               | [address]      |
|                       | [place]  [address] |
+-----------------------+----------------------+
| Home Phone            | [phone]      |
+-----------------------+----------------------+
| Preferred Language    | Unknown              |
+-----------------------+----------------------+
| Marital Status        | Single               |
+-----------------------+----------------------+
| Sabianism Affiliation | [id number]                 |
+-----------------------+----------------------+
| Race                  | Unknown              |
+-----------------------+----------------------+
| Ethnic Group          | Unknown              |
+-----------------------+----------------------+
 
 
 Author
 
 
+--------------+--------------------------------------------+
| Author       | [facility]  |
|              | and [place]                                |
+--------------+--------------------------------------------+
| Organization | [facility]  |
|              | and [place]                                |
+--------------+--------------------------------------------+
| Address      | Unknown                                    |
+--------------+--------------------------------------------+
| Phone        | Unavailable                                |
+--------------+--------------------------------------------+
 
 
 
 Support
 
 
+----------------+--------------+---------------------+-----------------+
| Name           | Relationship | Address             | Phone           |
+----------------+--------------+---------------------+-----------------+
| Ada/Ed Radhames | ECON         | [address]              | [phone] |
|                |              | [place]  |                 |
|                |              |  [address]              |                 |
+----------------+--------------+---------------------+-----------------+
 
 
 
 
 Care Team Providers
 
 
+-----------------------+------+-------------+
| Care Team Member Name | Role | Phone       |
+-----------------------+------+-------------+
 PCP  | Unavailable |
+-----------------------+------+-------------+
 
 
 
 Encounter Details
 
 
+--------+----------+---------------------+----------------------+-------------+
| Date   | Type     | Department          | Care Team            | Description |
+--------+----------+---------------------+----------------------+-------------+
| 06/15/ | Abstract |   [facility] [place]         |   Marzena Moser  |             |
|    |          | NEPHROLOGY  [address]   | M, DO  [address]      |             |
|        |          | [address]   | [address]      |             |
|        |          | [place], [place]     | [place]      |             |
|        |          | [address]          | [address]  [phone]  |             |
|        |          | [phone]        |  [phone] (Fax)  |             |
+--------+----------+---------------------+----------------------+-------------+
 
 
 
 Social History
 
 
+--------------+-------+-----------+--------+------+
| Tobacco Use  | Types | Packs/Day | Years  | Date |
|              |       |           | Used   |      |
+--------------+-------+-----------+--------+------+
| Never Smoker |       |           |        |      |
+--------------+-------+-----------+--------+------+
 
 
 
+---------------------+---+---+---+
| Smokeless Tobacco:  |   |   |   |
| Never Used          |   |   |   |
+---------------------+---+---+---+
 
 
 
+---------------------------------------------------------------+
| Comments: some second hand smoke exposure, but fairly minimal |
+---------------------------------------------------------------+
 
 
 
+-------------+-------------+---------+----------+
| Alcohol Use | Drinks/Week | oz/Week | Comments |
+-------------+-------------+---------+----------+
| No          |             |         |          |
+-------------+-------------+---------+----------+
 
 
 
 
+------------------+---------------+
| Sex Assigned at  | Date Recorded |
| Birth            |               |
+------------------+---------------+
| Not on file      |               |
+------------------+---------------+
 
 
 
+----------------+-------------+-------------+
| Job Start Date | Occupation  | Industry    |
+----------------+-------------+-------------+
| Not on file    | Not on file | Not on file |
+----------------+-------------+-------------+
 
 
 
+----------------+--------------+------------+
| Travel History | Travel Start | Travel End |
+----------------+--------------+------------+
 
 
 
+-------------------------------------+
| No recent travel history available. |
+-------------------------------------+
 documented as of this encounter
 
 Plan of Treatment
 
 
+--------+-----------+------------+----------------------+-------------------+
| Date   | Type      | Specialty  | Care Team            | Description       |
+--------+-----------+------------+----------------------+-------------------+
| / | Office    | Cardiology |   Tonia Wilson,    |                   |
|    | Visit     |            | ARNJESSICA  [address]   |                   |
|        |           |            | [place]  |                   |
|        |           |            | [address]  [phone]  |                   |
|        |           |            |  [phone] (Fax)  |                   |
+--------+-----------+------------+----------------------+-------------------+
| / | Hospital  | Radiology  |   Popeye Townsend, |                   |
|    | Encounter |            |  MD  [address] |                   |
|        |           |            |  St.  [place],   |                   |
|        |           |            | [place] [address]             |                   |
|        |           |            | [phone]         |                   |
|        |           |            | [phone] (Fax)   |                   |
+--------+-----------+------------+----------------------+-------------------+
| / | Surgery   | Radiology  |   Popeye Townsend, | CV EP PPM SYSTEM  |
|    |           |            |  MD  [address] | IMPLANT           |
|        |           |            |  St.  [place],   |                   |
|        |           |            | [place] [address]             |                   |
|        |           |            | [phone]         |                   |
|        |           |            | [phone] (Fax)   |                   |
+--------+-----------+------------+----------------------+-------------------+
| 02/10/ | Clinical  | Cardiology |                      |                   |
|    | Support   |            |                      |                   |
+--------+-----------+------------+----------------------+-------------------+
| / | Office    | Cardiology |   Tonia Wilson,    |                   |
|    | Visit     |            | ARNP  [address]   |                   |
 
|        |           |            | St  [place], [place]  |                   |
|        |           |            | [address]  [phone]  |                   |
|        |           |            |  [phone] (Fax)  |                   |
+--------+-----------+------------+----------------------+-------------------+
| / | Off-Site  | Nephrology |   Marzena Moser  |                   |
|    | Visit     |            | DO ETIENNE  [address] [place] [address]      |                   |
|        |           |            | [address]      |                   |
|        |           |            | [place]      |                   |
|        |           |            | [address]  [phone]  |                   |
|        |           |            |  [phone] (Fax)  |                   |
+--------+-----------+------------+----------------------+-------------------+
 documented as of this encounter
 
 Procedures
 
 
+--------------------+--------+------------+----------------------+----------------------+
| Procedure Name     | Priori | Date/Time  | Associated Diagnosis | Comments             |
|                    | ty     |            |                      |                      |
+--------------------+--------+------------+----------------------+----------------------+
| EXTERNAL LAB:      | Routin | 2015 |                      |   Results for this   |
| TACROLIMUS LEVEL,  | e      |            |                      | procedure are in the |
| [facility]               |        |            |                      |  results section.    |
+--------------------+--------+------------+----------------------+----------------------+
 documented in this encounter
 
 Results
 External Lab: Tacrolimus Level, [facility] (2015)
 
+-------------+-------+-----------+------------+--------------+
| Component   | Value | Ref Range | Performed  | Pathologist  |
|             |       |           | At         | Signature    |
+-------------+-------+-----------+------------+--------------+
| Tacrolimus, | 6.3   |           |            |              |
|  [facility],      |       |           |            |              |
| External    |       |           |            |              |
+-------------+-------+-----------+------------+--------------+
 
 
 
+----------+
| Specimen |
+----------+
|          |
+----------+
 documented in this encounter
 
 Visit Diagnoses
 Not on filedocumented in this encounter

## 2020-01-08 NOTE — XMS
Encounter Summary
  Created on: 2020
 
 Viviana Ricci
 External Reference #: [id number]
 : 47
 Sex: Female
 
 Demographics
 
 
+-----------------------+----------------------+
| Address               | [address]      |
|                       | [place]  [address] |
+-----------------------+----------------------+
| Home Phone            | [phone]      |
+-----------------------+----------------------+
| Preferred Language    | Unknown              |
+-----------------------+----------------------+
| Marital Status        | Single               |
+-----------------------+----------------------+
| Zoroastrianism Affiliation | [id number]                 |
+-----------------------+----------------------+
| Race                  | Unknown              |
+-----------------------+----------------------+
| Ethnic Group          | Unknown              |
+-----------------------+----------------------+
 
 
 Author
 
 
+--------------+--------------------------------------------+
| Author       |  and [facility]  |
|              | and [facility]                                |
+--------------+--------------------------------------------+
| Organization |  and [facility]  |
|              | and [place]                                |
+--------------+--------------------------------------------+
| Address      | Unknown                                    |
+--------------+--------------------------------------------+
| Phone        | Unavailable                                |
+--------------+--------------------------------------------+
 
 
 
 Support
 
 
+----------------+--------------+---------------------+-----------------+
| Name           | Relationship | Address             | Phone           |
+----------------+--------------+---------------------+-----------------+
| Ada/Ed Radhames | ECON         | [address]              | [phone] |
|                |              | [place]  |                 |
|                |              |  [address]              |                 |
+----------------+--------------+---------------------+-----------------+
 
 
 
 
 Care Team Providers
 
 
+-----------------------+------+-------------+
| Care Team Member Name | Role | Phone       |
+-----------------------+------+-------------+
 PCP  | Unavailable |
+-----------------------+------+-------------+
 
 
 
 Encounter Details
 
 
+--------+----------+---------------------+----------------------+-------------+
| Date   | Type     | Department          | Care Team            | Description |
+--------+----------+---------------------+----------------------+-------------+
| / | Abstract |   [facility] [place]         |   Marzena Moser  |             |
|    |          | NEPHROLOGY  [address]   | M, DO  [address]      |             |
|        |          | [address]   | [address]      |             |
|        |          | [place], [place]     | [place]      |             |
|        |          | [address]          | [address]  [phone]  |             |
|        |          | [phone]        |  [phone] (Fax)  |             |
+--------+----------+---------------------+----------------------+-------------+
 
 
 
 Social History
 
 
+--------------+-------+-----------+--------+------+
| Tobacco Use  | Types | Packs/Day | Years  | Date |
|              |       |           | Used   |      |
+--------------+-------+-----------+--------+------+
| Never Smoker |       |           |        |      |
+--------------+-------+-----------+--------+------+
 
 
 
+---------------------+---+---+---+
| Smokeless Tobacco:  |   |   |   |
| Never Used          |   |   |   |
+---------------------+---+---+---+
 
 
 
+---------------------------------------------------------------+
| Comments: some second hand smoke exposure, but fairly minimal |
+---------------------------------------------------------------+
 
 
 
+-------------+-------------+---------+----------+
| Alcohol Use | Drinks/Week | oz/Week | Comments |
+-------------+-------------+---------+----------+
| No          |             |         |          |
+-------------+-------------+---------+----------+
 
 
 
 
+------------------+---------------+
| Sex Assigned at  | Date Recorded |
| Birth            |               |
+------------------+---------------+
| Not on file      |               |
+------------------+---------------+
 
 
 
+----------------+-------------+-------------+
| Job Start Date | Occupation  | Industry    |
+----------------+-------------+-------------+
| Not on file    | Not on file | Not on file |
+----------------+-------------+-------------+
 
 
 
+----------------+--------------+------------+
| Travel History | Travel Start | Travel End |
+----------------+--------------+------------+
 
 
 
+-------------------------------------+
| No recent travel history available. |
+-------------------------------------+
 documented as of this encounter
 
 Plan of Treatment
 
 
+--------+-----------+------------+----------------------+-------------------+
| Date   | Type      | Specialty  | Care Team            | Description       |
+--------+-----------+------------+----------------------+-------------------+
| / | Office    | Cardiology |   Tonia Wilson,    |                   |
|    | Visit     |            | ARNJESSICA  [address]   |                   |
|        |           |            | [place]  |                   |
|        |           |            | [address]  [phone]  |                   |
|        |           |            |  [phone] (Fax)  |                   |
+--------+-----------+------------+----------------------+-------------------+
| / | Hospital  | Radiology  |   Popeye Townsend, |                   |
|    | Encounter |            |  MD  [address] |                   |
|        |           |            |  St.  [place],   |                   |
|        |           |            | [place] [address]             |                   |
|        |           |            | [phone]         |                   |
|        |           |            | [phone] (Fax)   |                   |
+--------+-----------+------------+----------------------+-------------------+
| / | Surgery   | Radiology  |   Popeye Townsend, | CV EP PPM SYSTEM  |
|    |           |            |  MD  [address] | IMPLANT           |
|        |           |            |  St.  [place],   |                   |
|        |           |            | [place] [address]             |                   |
|        |           |            | [phone]         |                   |
|        |           |            | [phone] (Fax)   |                   |
+--------+-----------+------------+----------------------+-------------------+
| 02/10/ | Clinical  | Cardiology |                      |                   |
|    | Support   |            |                      |                   |
+--------+-----------+------------+----------------------+-------------------+
| / | Office    | Cardiology |   Tonia Wilson,    |                   |
|    | Visit     |            | ARNP  [address]   |                   |
 
|        |           |            | St  [place], [place]  |                   |
|        |           |            | [address]  [phone]  |                   |
|        |           |            |  [phone] (Fax)  |                   |
+--------+-----------+------------+----------------------+-------------------+
| / | Off-Site  | Nephrology |   Marzena Moser  |                   |
|    | Visit     |            | DO ETIENNE  [address] [place] [address]      |                   |
|        |           |            | [address]      |                   |
|        |           |            | [place]      |                   |
|        |           |            | [address]  [phone]  |                   |
|        |           |            |  [phone] (Fax)  |                   |
+--------+-----------+------------+----------------------+-------------------+
 documented as of this encounter
 
 Procedures
 
 
+------------------+--------+------------+----------------------+----------------------+
| Procedure Name   | Priori | Date/Time  | Associated Diagnosis | Comments             |
|                  | ty     |            |                      |                      |
+------------------+--------+------------+----------------------+----------------------+
| CULTURE, URINE,  | Routin | 2018 |                      |   Results for this   |
| REFLEXIVE        | e      |            |                      | procedure are in the |
|                  |        |            |                      |  results section.    |
+------------------+--------+------------+----------------------+----------------------+
 documented in this encounter
 
 Results
 Culture, Urine, Reflexive (2018)
 
+-----------+---------+-----------+------------+--------------+
| Component | Value   | Ref Range | Performed  | Pathologist  |
|           |         |           | At         | Signature    |
+-----------+---------+-----------+------------+--------------+
| Urine     | 100,000 |           |            |              |
| Culture,  |         |           |            |              |
| Routine   |         |           |            |              |
+-----------+---------+-----------+------------+--------------+
 
 
 
+----------+
| Specimen |
+----------+
|          |
+----------+
 
 
 
+------------------+------------------+--------+----------------+
| Organism         | Antibiotic       | Method | Susceptibility |
+------------------+------------------+--------+----------------+
| Escherichia coli | Ampicillin       |        |   Sensitive    |
+------------------+------------------+--------+----------------+
| Escherichia coli | Amoxicillin +    |        |   Sensitive    |
|                  | Clavulanate      |        |                |
+------------------+------------------+--------+----------------+
| Escherichia coli | Piperacillin +   |        |   Sensitive    |
|                  | Tazobactam       |        |                |
+------------------+------------------+--------+----------------+
| Escherichia coli | Cefazolin        |        |   Sensitive    |
 
+------------------+------------------+--------+----------------+
| Escherichia coli | Ceftriaxone      |        |   Sensitive    |
+------------------+------------------+--------+----------------+
| Escherichia coli | Aztreonam        |        |   Sensitive    |
+------------------+------------------+--------+----------------+
| Escherichia coli | Ertapenem        |        |   Sensitive    |
+------------------+------------------+--------+----------------+
| Escherichia coli | Imipenem         |        |   Sensitive    |
+------------------+------------------+--------+----------------+
| Escherichia coli | Gentamicin       |        |   Sensitive    |
+------------------+------------------+--------+----------------+
| Escherichia coli | Ciprofloxacin    |        |   Sensitive    |
+------------------+------------------+--------+----------------+
| Escherichia coli | Levofloxacin     |        |   Sensitive    |
+------------------+------------------+--------+----------------+
| Escherichia coli | Tetracycline     |        |   Sensitive    |
+------------------+------------------+--------+----------------+
| Escherichia coli | Nitrofurantoin   |        |   Sensitive    |
+------------------+------------------+--------+----------------+
| Escherichia coli | Trimethoprim +   |        |   Sensitive    |
|                  | Sulfamethoxazole |        |                |
+------------------+------------------+--------+----------------+
 documented in this encounter
 
 Visit Diagnoses
 Not on filedocumented in this encounter

## 2020-01-08 NOTE — XMS
Encounter Summary
  Created on: 2020
 
 Viviana Ricci
 External Reference #: [id number]
 : 47
 Sex: Female
 
 Demographics
 
 
+-----------------------+----------------------+
| Address               | [address]      |
|                       | [place]  [address] |
+-----------------------+----------------------+
| Home Phone            | [phone]      |
+-----------------------+----------------------+
| Preferred Language    | Unknown              |
+-----------------------+----------------------+
| Marital Status        | Single               |
+-----------------------+----------------------+
| Mandaen Affiliation | 1009                 |
+-----------------------+----------------------+
| Race                  | Unknown              |
+-----------------------+----------------------+
| Ethnic Group          | Unknown              |
+-----------------------+----------------------+
 
 
 Author
 
 
+--------------+--------------------------------------------+
| Author       | [facility]  |
|              | and [facility]                                |
+--------------+--------------------------------------------+
| Organization | [facility]  |
|              | and [facility]                                |
+--------------+--------------------------------------------+
| Address      | Unknown                                    |
+--------------+--------------------------------------------+
| Phone        | Unavailable                                |
+--------------+--------------------------------------------+
 
 
 
 Support
 
 
+----------------+--------------+---------------------+-----------------+
| Name           | Relationship | Address             | Phone           |
+----------------+--------------+---------------------+-----------------+
| Ada/Ed Radhames | ECON         | [address]              | [phone] |
|                |              | [place]  |                 |
|                |              |  [address]              |                 |
+----------------+--------------+---------------------+-----------------+
 
 
 
 
 Care Team Providers
 
 
+-----------------------+------+-------------+
| Care Team Member Name | Role | Phone       |
+-----------------------+------+-------------+
 PCP  | Unavailable |
+-----------------------+------+-------------+
 
 
 
 Encounter Details
 
 
+--------+----------+---------------------+----------------------+-------------+
| Date   | Type     | Department          | Care Team            | Description |
+--------+----------+---------------------+----------------------+-------------+
| / | Abstract |   [facility] [place]         |   Marzena Moser  |             |
|    |          | NEPHROLOGY  [address]   | M, DO  [address]      |             |
|        |          | [address]   | [address]      |             |
|        |          | [place], [place]     | [place]      |             |
|        |          | [address]          | [address]  [phone]  |             |
|        |          | [phone]        |  [phone] (Fax)  |             |
+--------+----------+---------------------+----------------------+-------------+
 
 
 
 Social History
 
 
+--------------+-------+-----------+--------+------+
| Tobacco Use  | Types | Packs/Day | Years  | Date |
|              |       |           | Used   |      |
+--------------+-------+-----------+--------+------+
| Never Smoker |       |           |        |      |
+--------------+-------+-----------+--------+------+
 
 
 
+---------------------+---+---+---+
| Smokeless Tobacco:  |   |   |   |
| Never Used          |   |   |   |
+---------------------+---+---+---+
 
 
 
+---------------------------------------------------------------+
| Comments: some second hand smoke exposure, but fairly minimal |
+---------------------------------------------------------------+
 
 
 
+-------------+-------------+---------+----------+
| Alcohol Use | Drinks/Week | oz/Week | Comments |
+-------------+-------------+---------+----------+
| No          |             |         |          |
+-------------+-------------+---------+----------+
 
 
 
 
+------------------+---------------+
| Sex Assigned at  | Date Recorded |
| Birth            |               |
+------------------+---------------+
| Not on file      |               |
+------------------+---------------+
 
 
 
+----------------+-------------+-------------+
| Job Start Date | Occupation  | Industry    |
+----------------+-------------+-------------+
| Not on file    | Not on file | Not on file |
+----------------+-------------+-------------+
 
 
 
+----------------+--------------+------------+
| Travel History | Travel Start | Travel End |
+----------------+--------------+------------+
 
 
 
+-------------------------------------+
| No recent travel history available. |
+-------------------------------------+
 documented as of this encounter
 
 Plan of Treatment
 
 
+--------+-----------+------------+----------------------+-------------------+
| Date   | Type      | Specialty  | Care Team            | Description       |
+--------+-----------+------------+----------------------+-------------------+
| / | Office    | Cardiology |   Tonia Wilson,    |                   |
|    | Visit     |            | ARNJESSICA  [address]   |                   |
|        |           |            | [place]  |                   |
|        |           |            | [address]  [phone]  |                   |
|        |           |            |  [phone] (Fax)  |                   |
+--------+-----------+------------+----------------------+-------------------+
| / | Hospital  | Radiology  |   Popeye Townsend, |                   |
|    | Encounter |            |  MD  [address] |                   |
|        |           |            |  St.  [place],   |                   |
|        |           |            | [place] [address]             |                   |
|        |           |            | [phone]         |                   |
|        |           |            | [phone] (Fax)   |                   |
+--------+-----------+------------+----------------------+-------------------+
| / | Surgery   | Radiology  |   Popeye Townsend, | CV EP PPM SYSTEM  |
|    |           |            |  MD  [address] | IMPLANT           |
|        |           |            |  St.  [place],   |                   |
|        |           |            | [place] [address]             |                   |
|        |           |            | [phone]         |                   |
|        |           |            | [phone] (Fax)   |                   |
+--------+-----------+------------+----------------------+-------------------+
| 02/10/ | Clinical  | Cardiology |                      |                   |
|    | Support   |            |                      |                   |
+--------+-----------+------------+----------------------+-------------------+
| / | Office    | Cardiology |   Tonia Wilson,    |                   |
|    | Visit     |            | ARNP  [address]   |                   |
 
|        |           |            | St  [place], [place]  |                   |
|        |           |            | [address]  [phone]  |                   |
|        |           |            |  [phone] (Fax)  |                   |
+--------+-----------+------------+----------------------+-------------------+
| / | Off-Site  | Nephrology |   Marzena Moser  |                   |
|    | Visit     |            | DO ETIENNE  [address] [place] [address]      |                   |
|        |           |            | [address]      |                   |
|        |           |            | [place]      |                   |
|        |           |            | [address]  [phone]  |                   |
|        |           |            |  [phone] (Fax)  |                   |
+--------+-----------+------------+----------------------+-------------------+
 documented as of this encounter
 
 Procedures
 
 
+------------------+--------+------------+----------------------+----------------------+
| Procedure Name   | Priori | Date/Time  | Associated Diagnosis | Comments             |
|                  | ty     |            |                      |                      |
+------------------+--------+------------+----------------------+----------------------+
| CULTURE, URINE,  | Routin | 2018 |                      |   Results for this   |
| REFLEXIVE        | e      |            |                      | procedure are in the |
|                  |        |            |                      |  results section.    |
+------------------+--------+------------+----------------------+----------------------+
 documented in this encounter
 
 Results
 Culture, Urine, Reflexive (2018)
 
+-----------+---------+-----------+------------+--------------+
| Component | Value   | Ref Range | Performed  | Pathologist  |
|           |         |           | At         | Signature    |
+-----------+---------+-----------+------------+--------------+
| Urine     | 100,000 |           |            |              |
| Culture,  |         |           |            |              |
| Routine   |         |           |            |              |
+-----------+---------+-----------+------------+--------------+
 
 
 
+----------+
| Specimen |
+----------+
|          |
+----------+
 
 
 
+------------------+------------------+--------+----------------+
| Organism         | Antibiotic       | Method | Susceptibility |
+------------------+------------------+--------+----------------+
| Escherichia coli | Ampicillin       |        |   Sensitive    |
+------------------+------------------+--------+----------------+
| Escherichia coli | Amoxicillin +    |        |   Sensitive    |
|                  | Clavulanate      |        |                |
+------------------+------------------+--------+----------------+
| Escherichia coli | Piperacillin +   |        |   Sensitive    |
|                  | Tazobactam       |        |                |
+------------------+------------------+--------+----------------+
| Escherichia coli | Cefazolin        |        |   Sensitive    |
 
+------------------+------------------+--------+----------------+
| Escherichia coli | Ceftriaxone      |        |   Sensitive    |
+------------------+------------------+--------+----------------+
| Escherichia coli | Aztreonam        |        |   Sensitive    |
+------------------+------------------+--------+----------------+
| Escherichia coli | Ertapenem        |        |   Sensitive    |
+------------------+------------------+--------+----------------+
| Escherichia coli | Imipenem         |        |   Sensitive    |
+------------------+------------------+--------+----------------+
| Escherichia coli | Gentamicin       |        |   Sensitive    |
+------------------+------------------+--------+----------------+
| Escherichia coli | Ciprofloxacin    |        |   Sensitive    |
+------------------+------------------+--------+----------------+
| Escherichia coli | Levofloxacin     |        |   Sensitive    |
+------------------+------------------+--------+----------------+
| Escherichia coli | Tetracycline     |        |   Sensitive    |
+------------------+------------------+--------+----------------+
| Escherichia coli | Nitrofurantoin   |        |   Sensitive    |
+------------------+------------------+--------+----------------+
| Escherichia coli | Trimethoprim +   |        |   Sensitive    |
|                  | Sulfamethoxazole |        |                |
+------------------+------------------+--------+----------------+
 documented in this encounter
 
 Visit Diagnoses
 Not on filedocumented in this encounter

## 2020-01-08 NOTE — XMS
Encounter Summary
  Created on: 2020
 
 Viviana Ricci
 External Reference #: [id number]
 : 47
 Sex: Female
 
 Demographics
 
 
+-----------------------+----------------------+
| Address               | [address]      |
|                       | [place]  [address] |
+-----------------------+----------------------+
| Home Phone            | [phone]      |
+-----------------------+----------------------+
| Preferred Language    | Unknown              |
+-----------------------+----------------------+
| Marital Status        | Single               |
+-----------------------+----------------------+
| Alevism Affiliation | [id number]                 |
+-----------------------+----------------------+
| Race                  | Unknown              |
+-----------------------+----------------------+
| Ethnic Group          | Unknown              |
+-----------------------+----------------------+
 
 
 Author
 
 
+--------------+--------------------------------------------+
| Author       | [facility]  |
|              | and [place]                                |
+--------------+--------------------------------------------+
| Organization | [facility]  |
|              | and [place]                                |
+--------------+--------------------------------------------+
| Address      | Unknown                                    |
+--------------+--------------------------------------------+
| Phone        | Unavailable                                |
+--------------+--------------------------------------------+
 
 
 
 Support
 
 
+----------------+--------------+---------------------+-----------------+
| Name           | Relationship | Address             | Phone           |
+----------------+--------------+---------------------+-----------------+
| Ada/Ed Radhames | ECON         | [address]              | [phone] |
|                |              | [place]  |                 |
|                |              |  [address]              |                 |
+----------------+--------------+---------------------+-----------------+
 
 
 
 
 Care Team Providers
 
 
+-----------------------+------+-------------+
| Care Team Member Name | Role | Phone       |
+-----------------------+------+-------------+
 PCP  | Unavailable |
+-----------------------+------+-------------+
 
 
 
 Encounter Details
 
 
+--------+-------------+----------------------+----------------------+----------------------
+
| Date   | Type        | Department           | Care Team            | Description          
|
+--------+-------------+----------------------+----------------------+----------------------
+
| / | Orders Only |   [facility]          |   Maricruz Flanagan, | JACK on CPAP; Obesity 
|
|    |             | PULMONARY  [address]     |  RN                  |  hypoventilation     
|
|        |             | [place]  [place], |                      | syndrome (HCC)       
|
|        |             |  [place] [address]       |                      |                      
|
|        |             | [phone]         |                      |                      
|
+--------+-------------+----------------------+----------------------+----------------------
+
 
 
 
 Social History
 
 
+--------------+-------+-----------+--------+------+
| Tobacco Use  | Types | Packs/Day | Years  | Date |
|              |       |           | Used   |      |
+--------------+-------+-----------+--------+------+
| Never Smoker |       |           |        |      |
+--------------+-------+-----------+--------+------+
 
 
 
+---------------------+---+---+---+
| Smokeless Tobacco:  |   |   |   |
| Never Used          |   |   |   |
+---------------------+---+---+---+
 
 
 
+---------------------------------------------------------------+
| Comments: some second hand smoke exposure, but fairly minimal |
+---------------------------------------------------------------+
 
 
 
 
+-------------+-------------+---------+----------+
| Alcohol Use | Drinks/Week | oz/Week | Comments |
+-------------+-------------+---------+----------+
| No          |             |         |          |
+-------------+-------------+---------+----------+
 
 
 
+------------------+---------------+
| Sex Assigned at  | Date Recorded |
| Birth            |               |
+------------------+---------------+
| Not on file      |               |
+------------------+---------------+
 
 
 
+----------------+-------------+-------------+
| Job Start Date | Occupation  | Industry    |
+----------------+-------------+-------------+
| Not on file    | Not on file | Not on file |
+----------------+-------------+-------------+
 
 
 
+----------------+--------------+------------+
| Travel History | Travel Start | Travel End |
+----------------+--------------+------------+
 
 
 
+-------------------------------------+
| No recent travel history available. |
+-------------------------------------+
 documented as of this encounter
 
 Plan of Treatment
 
 
+--------+-----------+------------+----------------------+-------------------+
| Date   | Type      | Specialty  | Care Team            | Description       |
+--------+-----------+------------+----------------------+-------------------+
| / | Office    | Cardiology |   Tonia Wilson,    |                   |
|    | Visit     |            | [facility]  [address]   |                   |
|        |           |            | [place]  |                   |
|        |           |            | [address] [phone]  |                   |
|        |           |            |  [phone] (Fax)  |                   |
+--------+-----------+------------+----------------------+-------------------+
| / | Hospital  | Radiology  |   Popeye Townsend, |                   |
|    | Encounter |            |  MD  [address] [place] |                   |
|        |           |            |  St.  [place],   |                   |
|        |           |            | [place] [address]             |                   |
|        |           |            | [phone]         |                   |
|        |           |            | [phone] (Fax)   |                   |
+--------+-----------+------------+----------------------+-------------------+
| / | Surgery   | Radiology  |   Popeye Townsend, | CV EP PPM SYSTEM  |
|    |           |            |  MD  [address] | IMPLANT           |
|        |           |            |  St.  [place],   |                   |
|        |           |            | [place] [address]             |                   |
 
|        |           |            | [phone]         |                   |
|        |           |            | [phone] (Fax)   |                   |
+--------+-----------+------------+----------------------+-------------------+
| 02/10/ | Clinical  | Cardiology |                      |                   |
|    | Support   |            |                      |                   |
+--------+-----------+------------+----------------------+-------------------+
| / | Office    | Cardiology |   Tonia Wilson,    |                   |
|    | Visit     |            | [address]   |                   |
|        |           |            | St  [place], [place]  |                   |
|        |           |            | [address]  [phone]  |                   |
|        |           |            |  [phone] (Fax)  |                   |
+--------+-----------+------------+----------------------+-------------------+
| / | Off-Site  | Nephrology |   Marzena Moser  |                   |
| 2020   | Visit     |            | M,   [address]      |                   |
|        |           |            | [address]      |                   |
|        |           |            | [place]      |                   |
|        |           |            | [address]  [phone]  |                   |
|        |           |            |  [phone] (Fax)  |                   |
+--------+-----------+------------+----------------------+-------------------+
 documented as of this encounter
 
 Visit Diagnoses
 
 
+----------------------------------------------------------------------------+
| Diagnosis                                                                  |
+----------------------------------------------------------------------------+
|   JACK on CPAP  Obstructive sleep apnea (adult) (pediatric)                 |
+----------------------------------------------------------------------------+
|   Obesity hypoventilation syndrome (HCC)  Obesity hypoventilation syndrome |
+----------------------------------------------------------------------------+
 documented in this encounter

## 2020-01-08 NOTE — XMS
Encounter Summary
  Created on: 2020
 
 Viviana Ricci
 External Reference #: [id number]
 : 47
 Sex: Female
 
 Demographics
 
 
+-----------------------+----------------------+
| Address               | [address]      |
|                       | [place]  [address] |
+-----------------------+----------------------+
| Home Phone            | [phone]      |
+-----------------------+----------------------+
| Preferred Language    | Unknown              |
+-----------------------+----------------------+
| Marital Status        | Single               |
+-----------------------+----------------------+
| Yazidi Affiliation | [id number]                 |
+-----------------------+----------------------+
| Race                  | Unknown              |
+-----------------------+----------------------+
| Ethnic Group          | Unknown              |
+-----------------------+----------------------+
 
 
 Author
 
 
+--------------+--------------------------------------------+
| Author       | [facility]  |
|              | and [place]                                |
+--------------+--------------------------------------------+
| Organization | [facility]  |
|              | and [place]                                |
+--------------+--------------------------------------------+
| Address      | Unknown                                    |
+--------------+--------------------------------------------+
| Phone        | Unavailable                                |
+--------------+--------------------------------------------+
 
 
 
 Support
 
 
+----------------+--------------+---------------------+-----------------+
| Name           | Relationship | Address             | Phone           |
+----------------+--------------+---------------------+-----------------+
| Ada/Ed Radhames | ECON         | [address]              | [phone] |
|                |              | [place]  |                 |
|                |              |  [address]              |                 |
+----------------+--------------+---------------------+-----------------+
 
 
 
 
 Care Team Providers
 
 
+------------------------+------+-----------------+
| Care Team Member Name  | Role | Phone           |
+------------------------+------+-----------------+
| Marzena Moser DO | PCP  | [phone] |
+------------------------+------+-----------------+
 
 
 
 Encounter Details
 
 
+--------+-------------+---------------------+----------------------+----------------------+
| Date   | Type        | Department          | Care Team            | Description          |
+--------+-------------+---------------------+----------------------+----------------------+
| / | Orders Only |   [facility] [place]         |   Marzena Moser  | Breast lump in       |
| 2019   |             | NEPHROLOGY  [address]   | M, DO  [address]      | female (Primary Dx); |
|        |             | [address]   | [address]      |  Kidney replaced by  |
|        |             | [place], [place]     | [place], [place]      | transplant           |
|        |             | [address]          | [address]  [phone]  |                      |
|        |             | [phone]        |  [phone] (Fax)  |                      |
+--------+-------------+---------------------+----------------------+----------------------+
 
 
 
 Social History
 
 
+--------------+-------+-----------+--------+------+
| Tobacco Use  | Types | Packs/Day | Years  | Date |
|              |       |           | Used   |      |
+--------------+-------+-----------+--------+------+
| Never Smoker |       |           |        |      |
+--------------+-------+-----------+--------+------+
 
 
 
+---------------------+---+---+---+
| Smokeless Tobacco:  |   |   |   |
| Never Used          |   |   |   |
+---------------------+---+---+---+
 
 
 
+---------------------------------------------------------------+
| Comments: some second hand smoke exposure, but fairly minimal |
+---------------------------------------------------------------+
 
 
 
+-------------+-------------+---------+----------+
| Alcohol Use | Drinks/Week | oz/Week | Comments |
+-------------+-------------+---------+----------+
| No          |             |         |          |
+-------------+-------------+---------+----------+
 
 
 
 
+------------------+---------------+
| Sex Assigned at  | Date Recorded |
| Birth            |               |
+------------------+---------------+
| Not on file      |               |
+------------------+---------------+
 
 
 
+----------------+-------------+-------------+
| Job Start Date | Occupation  | Industry    |
+----------------+-------------+-------------+
| Not on file    | Not on file | Not on file |
+----------------+-------------+-------------+
 
 
 
+----------------+--------------+------------+
| Travel History | Travel Start | Travel End |
+----------------+--------------+------------+
 
 
 
+-------------------------------------+
| No recent travel history available. |
+-------------------------------------+
 documented as of this encounter
 
 Progress Bobbi Petit RN - 2019  3:18 PM PDTPt called requesting order for follow up [facility]. Order sent to [facility]. Electronically signed by Bobbi Hanson RN at   3:29 PM PDTdocumented in this encounter
 
 Plan of Treatment
 
 
+--------+-----------+------------+----------------------+-------------------+
| Date   | Type      | Specialty  | Care Team            | Description       |
+--------+-----------+------------+----------------------+-------------------+
| / | Office    | Cardiology |   Tonia Wilson,    |                   |
|    | [facility]     |            | [facility]  [address]   |                   |
|        |           |            | St [place], [place]  |                   |
|        |           |            | [address]  [phone]  |                   |
|        |           |            |  [phone] (Fax)  |                   |
+--------+-----------+------------+----------------------+-------------------+
| / | Hospital  | Radiology  |   Popeye Townsend, |                   |
|    | Encounter |            |  MD  [address] |                   |
|        |           |            |  [address]  [place],   |                   |
|        |           |            | [place] [address]             |                   |
|        |           |            | [phone]         |                   |
|        |           |            | [phone] (Fax)   |                   |
+--------+-----------+------------+----------------------+-------------------+
| / | Surgery   | Radiology  |   Popeye Townsend, | CV EP PPM SYSTEM  |
|    |           |            |  MD  [address] | IMPLANT           |
|        |           |            |  [place],   |                   |
|        |           |            | [place] [address]             |                   |
|        |           |            | [phone]         |                   |
|        |           |            | [phone] (Fax)   |                   |
+--------+-----------+------------+----------------------+-------------------+
 
| 02/10/ | Clinical  | Cardiology |                      |                   |
|    | Support   |            |                      |                   |
+--------+-----------+------------+----------------------+-------------------+
| / | Office    | Cardiology |   Tonia Wilson,    |                   |
|    | Visit     |            | [facility]  [address]   |                   |
|        |           |            | [place]  |                   |
|        |           |            | [address]  [phone]  |                   |
|        |           |            |  [phone] (Fax)  |                   |
+--------+-----------+------------+----------------------+-------------------+
| / | Off-Site  | Nephrology |   Marzena Moser  |                   |
|    | Visit     |            | DO [facility]  [address] [place] [address]      |                   |
|        |           |            | [address]      |                   |
|        |           |            | [place]      |                   |
|        |           |            | [address]  [phone]  |                   |
|        |           |            |  [phone] (Fax)  |                   |
+--------+-----------+------------+----------------------+-------------------+
 
 
 
+---------------------+---------+--------+----------------------+----------------------+
| Name                | Type    | Priori | Associated Diagnoses | Order Schedule       |
|                     |         | ty     |                      |                      |
+---------------------+---------+--------+----------------------+----------------------+
| US Breast Complete  | Imaging | Routin |   Breast lump in     | Expected:            |
| Left                |         | e      | female  Kidney       | 2019, Expires: |
|                     |         |        | replaced by          |  2020          |
|                     |         |        | transplant           |                      |
+---------------------+---------+--------+----------------------+----------------------+
 documented as of this encounter
 
 Visit Diagnoses
 
 
+-----------------------------------------------------------+
| Diagnosis                                                 |
+-----------------------------------------------------------+
|   Breast lump in female - Primary  Lump or mass in breast |
+-----------------------------------------------------------+
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
+-----------------------------------------------------------+
 documented in this encounter

## 2020-01-08 NOTE — XMS
Encounter Summary
  Created on: 2020
 
 Viviana Ricci
 External Reference #: [id number]
 : 47
 Sex: Female
 
 Demographics
 
 
+-----------------------+----------------------+
| Address               | [address]      |
|                       | [place]  [address] |
+-----------------------+----------------------+
| Home Phone            | [phone]      |
+-----------------------+----------------------+
| Preferred Language    | Unknown              |
+-----------------------+----------------------+
| Marital Status        | Single               |
+-----------------------+----------------------+
| Latter-day Affiliation | [id number]                 |
+-----------------------+----------------------+
| Race                  | Unknown              |
+-----------------------+----------------------+
| Ethnic Group          | Unknown              |
+-----------------------+----------------------+
 
 
 Author
 
 
+--------------+--------------------------------------------+
| Author       | [facility]  |
|              | and [facility]                                |
+--------------+--------------------------------------------+
| Organization | [facility]  |
|              | and [facility]                                |
+--------------+--------------------------------------------+
| Address      | Unknown                                    |
+--------------+--------------------------------------------+
| Phone        | Unavailable                                |
+--------------+--------------------------------------------+
 
 
 
 Support
 
 
+----------------+--------------+---------------------+-----------------+
| Name           | Relationship | Address             | Phone           |
+----------------+--------------+---------------------+-----------------+
| Ada/Ed Radhames | ECON         | [address]              | [phone] |
|                |              | [place]  |                 |
|                |              |  [address]              |                 |
+----------------+--------------+---------------------+-----------------+
 
 
 
 
 Care Team Providers
 
 
+-----------------------+------+-------------+
| Care Team Member Name | Role | Phone       |
+-----------------------+------+-------------+
 PCP  | Unavailable |
+-----------------------+------+-------------+
 
 
 
 Encounter Details
 
 
+--------+-----------+----------------------+----------------------+-------------+
| Date   | Type      | Department           | Care Team            | Description |
+--------+-----------+----------------------+----------------------+-------------+
| / | [facility]  |   [facility] |   Marzena Moser  |             |
|  - | Encounter |  MED CTR XRAY  [address] | M, DO  [address]      |             |
|        |           |  [address]       | [address]      |             |
| / |           | [place] [address] | [place]      |             |
|    |           |   [phone]       | [address]  [phone]  |             |
|        |           |                      |  [phone] (Fax)  |             |
+--------+-----------+----------------------+----------------------+-------------+
 
 
 
 Social History
 
 
+----------------+-------+-----------+--------+------+
| Tobacco Use    | Types | Packs/Day | Years  | Date |
|                |       |           | Used   |      |
+----------------+-------+-----------+--------+------+
| Never Assessed |       |           |        |      |
+----------------+-------+-----------+--------+------+
 
 
 
+------------------+---------------+
| Sex Assigned at  | Date Recorded |
| Birth            |               |
+------------------+---------------+
| Not on file      |               |
+------------------+---------------+
 
 
 
+----------------+-------------+-------------+
| Job Start Date | Occupation  | Industry    |
+----------------+-------------+-------------+
| Not on file    | Not on file | Not on file |
+----------------+-------------+-------------+
 
 
 
+----------------+--------------+------------+
| Travel History | Travel Start | Travel End |
+----------------+--------------+------------+
 
 
 
 
+-------------------------------------+
| No recent travel history available. |
+-------------------------------------+
 documented as of this encounter
 
 Plan of Treatment
 
 
+--------+-----------+------------+----------------------+-------------------+
| Date   | Type      | Specialty  | Care Team            | Description       |
+--------+-----------+------------+----------------------+-------------------+
| / | Office    | Cardiology |   Tonia Wilson,    |                   |
|    | Visit     |            | ARNJESSICA  [address]   |                   |
|        |           |            | St  [place], [place]  |                   |
|        |           |            | [address]  [phone]  |                   |
|        |           |            |  [phone] (Fax)  |                   |
+--------+-----------+------------+----------------------+-------------------+
| / | Hospital  | Radiology  |   Popeye Townsend, |                   |
|    | Encounter |            |  MD  [address] |                   |
|        |           |            |  St.  [place],   |                   |
|        |           |            | [place] [address]             |                   |
|        |           |            | [phone]         |                   |
|        |           |            | [phone] (Fax)   |                   |
+--------+-----------+------------+----------------------+-------------------+
| / | Surgery   | Radiology  |   Popeye Townsend, | CV EP PPM SYSTEM  |
|    |           |            |  MD  [address] | IMPLANT           |
|        |           |            |  St.  [place],   |                   |
|        |           |            | [place] [address]             |                   |
|        |           |            | [phone]         |                   |
|        |           |            | [phone] (Fax)   |                   |
+--------+-----------+------------+----------------------+-------------------+
| 02/10/ | Clinical  | Cardiology |                      |                   |
|    | Support   |            |                      |                   |
+--------+-----------+------------+----------------------+-------------------+
| / | Office    | Cardiology |   Tonia Wilson,    |                   |
|    | Visit     |            | ARNP  [address]   |                   |
|        |           |            | [place]  |                   |
|        |           |            | [address]  [phone]  |                   |
|        |           |            |  [phone] (Fax)  |                   |
+--------+-----------+------------+----------------------+-------------------+
| / | Off-Site  | Nephrology |   Marzena Moser  |                   |
|    | Visit     |            | DO ETIENNE  [address] [place] [address]      |                   |
|        |           |            | [address]      |                   |
|        |           |            | [place]      |                   |
|        |           |            | [address] [phone]  |                   |
|        |           |            |  [phone] (Fax)  |                   |
+--------+-----------+------------+----------------------+-------------------+
 documented as of this encounter
 
 Visit Diagnoses
 Not on filedocumented in this encounter

## 2020-01-08 NOTE — XMS
Encounter Summary
  Created on: 2020
 
 Viviana Ricci
 External Reference #: [id number]
 : 47
 Sex: Female
 
 Demographics
 
 
+-----------------------+----------------------+
| Address               | [address]      |
|                       | [place]  [address] |
+-----------------------+----------------------+
| Home Phone            | [phone]      |
+-----------------------+----------------------+
| Preferred Language    | Unknown              |
+-----------------------+----------------------+
| Marital Status        | Single               |
+-----------------------+----------------------+
| Rastafarian Affiliation | [id number]                 |
+-----------------------+----------------------+
| Race                  | Unknown              |
+-----------------------+----------------------+
| Ethnic Group          | Unknown              |
+-----------------------+----------------------+
 
 
 Author
 
 
+--------------+--------------------------------------------+
| Author       | [facility]  |
|              | and [place]                                |
+--------------+--------------------------------------------+
| Organization | [facility]  |
|              | and [place]                                |
+--------------+--------------------------------------------+
| Address      | Unknown                                    |
+--------------+--------------------------------------------+
| Phone        | Unavailable                                |
+--------------+--------------------------------------------+
 
 
 
 Support
 
 
+----------------+--------------+---------------------+-----------------+
| Name           | Relationship | Address             | Phone           |
+----------------+--------------+---------------------+-----------------+
| Ada/Ed Radhames | ECON         | [address]              | [phone] |
|                |              | [place]  |                 |
|                |              |  [address]              |                 |
+----------------+--------------+---------------------+-----------------+
 
 
 
 
 Care Team Providers
 
 
+-----------------------+------+-------------+
| Care Team Member Name | Role | Phone       |
+-----------------------+------+-------------+
 PCP  | Unavailable |
+-----------------------+------+-------------+
 
 
 
 Encounter Details
 
 
+--------+----------+---------------------+----------------------+-------------+
| Date   | Type     | Department          | Care Team            | Description |
+--------+----------+---------------------+----------------------+-------------+
| / | Abstract |   [facility] [place]         |   Marzena Moser  |             |
|    |          | NEPHROLOGY  [address]   | M, DO  [address]      |             |
|        |          | [address]   | [address]      |             |
|        |          | [place], [place]     | [place]      |             |
|        |          | [address]          | [address]  [phone]  |             |
|        |          | [phone]        |  [phone] (Fax)  |             |
+--------+----------+---------------------+----------------------+-------------+
 
 
 
 Social History
 
 
+--------------+-------+-----------+--------+------+
| Tobacco Use  | Types | Packs/Day | Years  | Date |
|              |       |           | Used   |      |
+--------------+-------+-----------+--------+------+
| Never Smoker |       |           |        |      |
+--------------+-------+-----------+--------+------+
 
 
 
+---------------------+---+---+---+
| Smokeless Tobacco:  |   |   |   |
| Never Used          |   |   |   |
+---------------------+---+---+---+
 
 
 
+---------------------------------------------------------------+
| Comments: some second hand smoke exposure, but fairly minimal |
+---------------------------------------------------------------+
 
 
 
+-------------+-------------+---------+----------+
| Alcohol Use | Drinks/Week | oz/Week | Comments |
+-------------+-------------+---------+----------+
| No          |             |         |          |
+-------------+-------------+---------+----------+
 
 
 
 
+------------------+---------------+
| Sex Assigned at  | Date Recorded |
| Birth            |               |
+------------------+---------------+
| Not on file      |               |
+------------------+---------------+
 
 
 
+----------------+-------------+-------------+
| Job Start Date | Occupation  | Industry    |
+----------------+-------------+-------------+
| Not on file    | Not on file | Not on file |
+----------------+-------------+-------------+
 
 
 
+----------------+--------------+------------+
| Travel History | Travel Start | Travel End |
+----------------+--------------+------------+
 
 
 
+-------------------------------------+
| No recent travel history available. |
+-------------------------------------+
 documented as of this encounter
 
 Plan of Treatment
 
 
+--------+-----------+------------+----------------------+-------------------+
| Date   | Type      | Specialty  | Care Team            | Description       |
+--------+-----------+------------+----------------------+-------------------+
| / | Office    | Cardiology |   Tonia Wilson,    |                   |
|    | Visit     |            | ARNJESSICA  [address]   |                   |
|        |           |            | [place]  |                   |
|        |           |            | [address]  [phone]  |                   |
|        |           |            |  [phone] (Fax)  |                   |
+--------+-----------+------------+----------------------+-------------------+
| / | Hospital  | Radiology  |   Popeye Townsend, |                   |
|    | Encounter |            |  MD  [address] |                   |
|        |           |            |  St.  [place],   |                   |
|        |           |            | [place] [address]             |                   |
|        |           |            | [phone]         |                   |
|        |           |            | [phone] (Fax)   |                   |
+--------+-----------+------------+----------------------+-------------------+
| / | Surgery   | Radiology  |   Popeye Townsend, | CV EP PPM SYSTEM  |
|    |           |            |  MD  [address] | IMPLANT           |
|        |           |            |  St.  [place],   |                   |
|        |           |            | [place] [address]             |                   |
|        |           |            | [phone]         |                   |
|        |           |            | [phone] (Fax)   |                   |
+--------+-----------+------------+----------------------+-------------------+
| 02/10/ | Clinical  | Cardiology |                      |                   |
|    | Support   |            |                      |                   |
+--------+-----------+------------+----------------------+-------------------+
| / | Office    | Cardiology |   Tonia Wilson,    |                   |
|    | Visit     |            | ARNP  [address]   |                   |
 
|        |           |            | St  [place], [place]  |                   |
|        |           |            | [address] [phone]  |                   |
|        |           |            |  [phone] (Fax)  |                   |
+--------+-----------+------------+----------------------+-------------------+
| / | Off-Site  | Nephrology |   Marzena Moser  |                   |
| 2020   | Visit     |            | DO ETIENNE  [address] [place] [address]      |                   |
|        |           |            | [address]      |                   |
|        |           |            | [place]      |                   |
|        |           |            | [id number] [phone]  |                   |
|        |           |            |  [phone] (Fax)  |                   |
+--------+-----------+------------+----------------------+-------------------+
 documented as of this encounter
 
 Visit Diagnoses
 Not on filedocumented in this encounter Never smoker

## 2020-01-08 NOTE — XMS
Encounter Summary
  Created on: 2020
 
 Viviana Ricci
 External Reference #: [id number]
 : 47
 Sex: Female
 
 Demographics
 
 
+-----------------------+----------------------+
| Address               | [address]      |
|                       | [place]  [address] |
+-----------------------+----------------------+
| Home Phone            | [phone]      |
+-----------------------+----------------------+
| Preferred Language    | Unknown              |
+-----------------------+----------------------+
| Marital Status        | Single               |
+-----------------------+----------------------+
| [employer] Affiliation | [id number]                 |
+-----------------------+----------------------+
| Race                  | Unknown              |
+-----------------------+----------------------+
| Ethnic Group          | Unknown              |
+-----------------------+----------------------+
 
 
 Author
 
 
+--------------+--------------------------------------------+
| Author       | [facility]  |
|              | and [facility]                                |
+--------------+--------------------------------------------+
| Organization | [facility]  |
|              | and [facility]                                |
+--------------+--------------------------------------------+
| Address      | Unknown                                    |
+--------------+--------------------------------------------+
| Phone        | Unavailable                                |
+--------------+--------------------------------------------+
 
 
 
 Support
 
 
+----------------+--------------+---------------------+-----------------+
| Name           | Relationship | Address             | Phone           |
+----------------+--------------+---------------------+-----------------+
| Ada/Ed Radhames | ECON         | [address]              | [phone] |
|                |              | [place]  |                 |
|                |              |  [id number]              |                 |
+----------------+--------------+---------------------+-----------------+
 
 
 
 
 Care Team Providers
 
 
+-----------------------+------+-------------+
| Care Team Member Name | Role | Phone       |
+-----------------------+------+-------------+
 PCP  | Unavailable |
+-----------------------+------+-------------+
 
 
 
 Reason for Visit
 
 
+-----------+--------------------------------------------------------+
| Reason    | Comments                                               |
+-----------+--------------------------------------------------------+
| Follow-up | Follow Up Right Shoulder MRI done at [facility] on 3/10/2017 |
+-----------+--------------------------------------------------------+
 
 
 
 Encounter Details
 
 
+--------+---------+----------------------+----------------------+----------------------+
| Date   | Type    | Department           | Care Team            | Description          |
+--------+---------+----------------------+----------------------+----------------------+
| / | Office  |   [facility]          |   Edgar Sanders,   | Rotator cuff tear    |
| 2017   | Visit   | ORTHOPEDIC SURGERY   | MD Ervin TURNER      | arthropathy of right |
|        |         | [address] [place] [address]     | [place]      |  shoulder (Primary   |
|        |         | [place], [place]      | [address] [phone]  | Dx)                  |
|        |         | [address]           |  [phone] (Fax)  |                      |
|        |         | [phone]         |                      |                      |
+--------+---------+----------------------+----------------------+----------------------+
 
 
 
 Social History
 
 
+--------------+-------+-----------+--------+------+
| Tobacco Use  | Types | Packs/Day | Years  | Date |
|              |       |           | Used   |      |
+--------------+-------+-----------+--------+------+
| Never Smoker |       |           |        |      |
+--------------+-------+-----------+--------+------+
 
 
 
+---------------------+---+---+---+
| Smokeless Tobacco:  |   |   |   |
| Never Used          |   |   |   |
+---------------------+---+---+---+
 
 
 
+---------------------------------------------------------------+
| Comments: some second hand smoke exposure, but fairly minimal |
 
+---------------------------------------------------------------+
 
 
 
+-------------+-------------+---------+----------+
| Alcohol Use | Drinks/Week | oz/Week | Comments |
+-------------+-------------+---------+----------+
| No          |             |         |          |
+-------------+-------------+---------+----------+
 
 
 
+------------------+---------------+
| Sex Assigned at  | Date Recorded |
| Birth            |               |
+------------------+---------------+
| Not on file      |               |
+------------------+---------------+
 
 
 
+----------------+-------------+-------------+
| Job Start Date | Occupation  | Industry    |
+----------------+-------------+-------------+
| Not on file    | Not on file | Not on file |
+----------------+-------------+-------------+
 
 
 
+----------------+--------------+------------+
| Travel History | Travel Start | Travel End |
+----------------+--------------+------------+
 
 
 
+-------------------------------------+
| No recent travel history available. |
+-------------------------------------+
 documented as of this encounter
 
 Last Filed Vital Signs
 
 
+-------------------+-------------------+----------------------+----------+
| Vital Sign        | Reading           | Time Taken           | Comments |
+-------------------+-------------------+----------------------+----------+
| Blood Pressure    | -                 | -                    |          |
+-------------------+-------------------+----------------------+----------+
| Pulse             | -                 | -                    |          |
+-------------------+-------------------+----------------------+----------+
| Temperature       | -                 | -                    |          |
+-------------------+-------------------+----------------------+----------+
| Respiratory Rate  | -                 | -                    |          |
+-------------------+-------------------+----------------------+----------+
| Oxygen Saturation | -                 | -                    |          |
+-------------------+-------------------+----------------------+----------+
| Inhaled Oxygen    | -                 | -                    |          |
| Concentration     |                   |                      |          |
+-------------------+-------------------+----------------------+----------+
| Weight            | 121.1 kg (267 lb) | 2017 10:34 AM  |          |
 
|                   |                   | PDT                  |          |
+-------------------+-------------------+----------------------+----------+
| Height            | 167.6 cm (5' 6")  | 2017 10:34 AM  |          |
|                   |                   | PDT                  |          |
+-------------------+-------------------+----------------------+----------+
| Body Mass Index   | 43.09             | 2017 10:34 AM  |          |
|                   |                   | PDT                  |          |
+-------------------+-------------------+----------------------+----------+
 documented in this encounter
 
 Progress Notes
 Edgar Sanders MD - 2017  8:24 PM PDTPatient returns to go over her shoulder MRI
 She has a massive irreparable rotator cuff tear
 She has amazing shoulder function and deltoid strength
 She doesn't have a hornblower sign over drop arm sign
 My rec is to treat this conservatively - many an intermittent steroid injection from time t
o time
 The role of reverse TSA is discussed
 She is happy with the current planElectronically signed by Edgar Sanders MD at 2017
  8:26 PM PDTdocumented in this encounter
 
 Plan of Treatment
 
 
+--------+-----------+------------+----------------------+-------------------+
| Date   | Type      | Specialty  | Care Team            | Description       |
+--------+-----------+------------+----------------------+-------------------+
| / | Office    | Cardiology |   Tonia Wilson,    |                   |
|    | Visit     |            | ARNP  [address]   |                   |
|        |           |            | [facility] [place]  |                   |
|        |           |            | [address]  [phone]  |                   |
|        |           |            |  [phone] (Fax)  |                   |
+--------+-----------+------------+----------------------+-------------------+
| / | Hospital  | Radiology  |   Popeye Townsend, |                   |
2020   | Encounter |            |  MD  [address] |                   |
|        |           |            |  [facility]   |                   |
|        |           |            | [place] [address]             |                   |
|        |           |            | [phone]         |                   |
|        |           |            | [phone] (Fax)   |                   |
+--------+-----------+------------+----------------------+-------------------+
| / | Surgery   | Radiology  |   Popeye Townsend, | CV EP PPM SYSTEM  |
|    |           |            |  MD  [address] | IMPLANT           |
|        |           |            |  [place],   |                   |
|        |           |            | [place] [address]             |                   |
|        |           |            | [phone]         |                   |
|        |           |            | [phone] (Fax)   |                   |
+--------+-----------+------------+----------------------+-------------------+
| 02/10/ | Clinical  | Cardiology |                      |                   |
|    | Support   |            |                      |                   |
+--------+-----------+------------+----------------------+-------------------+
| / | Office    | Cardiology |   Tonia Wilson,    |                   |
|    | Visit     |            | ARNJESSICA  [address]   |                   |
|        |           |            | [place]  |                   |
|        |           |            | [address]  [phone]  |                   |
|        |           |            |  [phone] (Fax)  |                   |
+--------+-----------+------------+----------------------+-------------------+
| / | Off-Site  | Nephrology |   Marzena Moser  |                   |
|    | Visit     |            | DO ETIENNE  [address]      |                   |
|        |           |            | [address]      |                   |
|        |           |            | [place]      |                   |
 
|        |           |            | [address]  [phone]  |                   |
|        |           |            |  [phone] (Fax)  |                   |
+--------+-----------+------------+----------------------+-------------------+
 documented as of this encounter
 
 Visit Diagnoses
 
 
+--------------------------------------------------------------------------------------+
| Diagnosis                                                                            |
+--------------------------------------------------------------------------------------+
|   Rotator cuff tear arthropathy of right shoulder - Primary  Traumatic arthropathy,  |
| shoulder region                                                                      |
+--------------------------------------------------------------------------------------+
 documented in this encounter

## 2020-01-08 NOTE — XMS
Encounter Summary
  Created on: 2020
 
 Viviana Ricci
 External Reference #: [id number]
 : 47
 Sex: Female
 
 Demographics
 
 
+-----------------------+----------------------+
| Address               | [address]      |
|                       | [place]  [address] |
+-----------------------+----------------------+
| Home Phone            | [phone]      |
+-----------------------+----------------------+
| Preferred Language    | Unknown              |
+-----------------------+----------------------+
| Marital Status        | Single               |
+-----------------------+----------------------+
| Congregation Affiliation | 1009                 |
+-----------------------+----------------------+
| Race                  | Unknown              |
+-----------------------+----------------------+
| Ethnic Group          | Unknown              |
+-----------------------+----------------------+
 
 
 Author
 
 
+--------------+--------------------------------------------+
| Author       | [facility]  |
|              | and [facility]                                |
+--------------+--------------------------------------------+
| Organization | [facility]  |
|              | and [facility]                                |
+--------------+--------------------------------------------+
| Address      | Unknown                                    |
+--------------+--------------------------------------------+
| Phone        | Unavailable                                |
+--------------+--------------------------------------------+
 
 
 
 Support
 
 
+----------------+--------------+---------------------+-----------------+
| Name           | Relationship | Address             | Phone           |
+----------------+--------------+---------------------+-----------------+
| Ada/Ed Radhames | ECON         | [address]              | [phone] |
|                |              | [place]  |                 |
|                |              |  [address]              |                 |
+----------------+--------------+---------------------+-----------------+
 
 
 
 
 Care Team Providers
 
 
+-----------------------+------+-------------+
| Care Team Member Name | Role | Phone       |
+-----------------------+------+-------------+
 PCP  | Unavailable |
+-----------------------+------+-------------+
 
 
 
 Reason for Visit
 Evaluate & Treat (Routine)
 
+--------+--------+------------+--------------+--------------+---------------+
| Status | Reason | Specialty  | Diagnoses /  | Referred By  | Referred To   |
|        |        |            | Procedures   | Contact      | Contact       |
+--------+--------+------------+--------------+--------------+---------------+
| Closed |        | Nephrology |   Diagnoses  |   Stroemel,  |   Stroemel,   |
|        |        |            |  Unspecified | Marzena CLIFTON,   | Marzena CLIFTON DO |
|        |        |            |              | DO  [address] |   [address]    |
|        |        |            | complication |  [place] | [place]   |
|        |        |            |  of kidney   |  [address]  [place]  | [address]  [place]    |
|        |        |            | transplant   | [place]    | [place]     |
|        |        |            | FSGS (focal  | [address]        | [address]  Phone: |
|        |        |            | segmental    | Phone:       |  [phone] |
|        |        |            | glomeruloscl | [phone] |   Fax:        |
|        |        |            | erosis)      |   Fax:       | [phone]  |
|        |        |            | Hypertension | [phone] |               |
|        |        |            | , essential  |              |               |
|        |        |            |  Procedures  |              |               |
|        |        |            |  IL OFFICE   |              |               |
|        |        |            | OUTPATIENT   |              |               |
|        |        |            | VISIT 25     |              |               |
|        |        |            | MINUTES      |              |               |
+--------+--------+------------+--------------+--------------+---------------+
 
 
 
 
 Encounter Details
 
 
+--------+-----------+---------------------+----------------------+----------------------+
| Date   | Type      | Department          | Care Team            | Description          |
+--------+-----------+---------------------+----------------------+----------------------+
| / | Off-Site  |   [facility]         |   Marzena Moser  | Kidney replaced by   |
| 2018   | Visit     | NEPHROLOGY  [address]   | M, DO  [address]      | transplant (Primary  |
|        |           | [address]   | [place]      | Dx); Hypertension,   |
|        |           | [place], [place]     | [place]      | essential; Type 2 DM |
|        |           | [address]          | [address]  [phone]  |  with CKD stage 2    |
|        |           | [phone]        |  [phone] (Fax)  | and hypertension     |
|        |           |                     |                      | (HCC); Acute         |
|        |           |                     |                      | cystitis without     |
|        |           |                     |                      | hematuria            |
+--------+-----------+---------------------+----------------------+----------------------+
 
 
 
 
 Social History
 
 
+--------------+-------+-----------+--------+------+
| Tobacco Use  | Types | Packs/Day | Years  | Date |
|              |       |           | Used   |      |
+--------------+-------+-----------+--------+------+
| Never Smoker |       |           |        |      |
+--------------+-------+-----------+--------+------+
 
 
 
+---------------------+---+---+---+
| Smokeless Tobacco:  |   |   |   |
| Never Used          |   |   |   |
+---------------------+---+---+---+
 
 
 
+---------------------------------------------------------------+
| Comments: some second hand smoke exposure, but fairly minimal |
+---------------------------------------------------------------+
 
 
 
+-------------+-------------+---------+----------+
| Alcohol Use | Drinks/Week | oz/Week | Comments |
+-------------+-------------+---------+----------+
| No          |             |         |          |
+-------------+-------------+---------+----------+
 
 
 
+------------------+---------------+
| Sex Assigned at  | Date Recorded |
| Birth            |               |
+------------------+---------------+
| Not on file      |               |
+------------------+---------------+
 
 
 
+----------------+-------------+-------------+
| Job Start Date | Occupation  | Industry    |
+----------------+-------------+-------------+
| Not on file    | Not on file | Not on file |
+----------------+-------------+-------------+
 
 
 
+----------------+--------------+------------+
| Travel History | Travel Start | Travel End |
+----------------+--------------+------------+
 
 
 
+-------------------------------------+
| No recent travel history available. |
+-------------------------------------+
 documented as of this encounter
 
 
 Last Filed Vital Signs
 
 
+-------------------+----------------------+----------------------+----------+
| Vital Sign        | Reading              | Time Taken           | Comments |
+-------------------+----------------------+----------------------+----------+
| Blood Pressure    | 144/80               | 2018  6:17 PM  |          |
|                   |                      | PDT                  |          |
+-------------------+----------------------+----------------------+----------+
| Pulse             | -                    | -                    |          |
+-------------------+----------------------+----------------------+----------+
| Temperature       | 36   C (96.8   F)    | 2018  6:17 PM  |          |
|                   |                      | PDT                  |          |
+-------------------+----------------------+----------------------+----------+
| Respiratory Rate  | -                    | -                    |          |
+-------------------+----------------------+----------------------+----------+
| Oxygen Saturation | -                    | -                    |          |
+-------------------+----------------------+----------------------+----------+
| Inhaled Oxygen    | -                    | -                    |          |
| Concentration     |                      |                      |          |
+-------------------+----------------------+----------------------+----------+
| Weight            | 117.1 kg (258 lb 2.5 | 2018  6:17 PM  |          |
|                   |  oz)                 | PDT                  |          |
+-------------------+----------------------+----------------------+----------+
| Height            | -                    | -                    |          |
+-------------------+----------------------+----------------------+----------+
| Body Mass Index   | 41.67                | 2017 10:34 AM  |          |
|                   |                      | PDT                  |          |
+-------------------+----------------------+----------------------+----------+
 documented in this encounter
 
 Progress Notes
 Marzena Moser DO - 2018  2:00 PM PDTFormatting of this note might be different
 from the original.
 Subjective:   NEPHROLOGY
  
  
 Patient ID: Viviana Ricci is a 72 y.o. female.
 
 HPI Comments:
 Followup for this very pleasant, 72 YOWF  s/p a renal allograft, 05, at [facility] with agnes
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
idism, hyperlipidemia, SHPTH,  previous low back pain, obesity/JACK, chronic pulmonary  hyper
tension, historically related to centripetal obesity, and  CAD, s/p CABG x3 vessels,. 
 
 Historically, she is very consistent about her low-salt diet and taking her immunosuppressi
on consistently.  She also is very consistent with follow-up.  She denies fever, chills, dys
uria, edema, or allograft tenderness.
 
  
 
 MEDS:
 
 Prograf 1.5 mg, BID
 Mycophenolate 250 mg, BID.
 Prednisone 5 mg, daily.
 Outpatient Prescriptions Marked as Taking for the 18 encounter (Off-Site Visit) with Maye Msoer, DO 
 Medication Sig Dispense Refill 
 
   allopurinol (ZYLOPRIM) 100 mg tablet take 1 tablet by mouth once daily 30 tablet 11 
   aspirin 81 mg EC tablet Take 81 mg by mouth Daily.   
   B-D INS SYRINGE 0.5CC/31GX5/16 31G X 5/16" 0.5 ML MISC use as directed BEFORE MEALS 100
 each 11 
   cholecalciferol (VITAMIN D-3) 2000 UNITS TABS Take 5,000 Units by mouth Every other day
.   
   fludrocortisone (FLORINEF) 0.1 mg tablet Take 1 tablet by mouth Every other day. 90 tab
let 4 
   furosemide (LASIX) 40 mg tablet Take 1 tablet by mouth Daily as needed. 30 tablet 5 
   glucose blood VI test strips (ONE TOUCH ULTRA TEST) strip Check blood sugar before each
 meal and as directed 100 each 12 
   insulin lispro (HUMALOG) 100 units/mL injection (vial) inject subcutaneously BEFORE CHIKA
LS ACCORDING TO SLIDING SCALE Max dose 20 units daily 10 vial 11 
   LANTUS 100 UNIT/ML injection (vial) inject 20 units subcutaneously every morning 10 via
l 11 
   levothyroxine (SYNTHROID) 50 mcg tablet take 1 tablet by mouth once daily 30 tablet 11 
   lisinopril (PRINIVIL,ZESTRIL) 30 MG tablet take 1 tablet by mouth once daily 90 tablet 
3 
   loperamide (ANTI-DIARRHEAL) 2 mg capsule Take 1 capsule by mouth 4 times daily as neede
d. 60 capsule 5 
   losartan (COZAAR) 50 mg tablet take 1 tablet by mouth once daily 90 tablet 3 
   magnesium oxide (MAG-OX) 400 mg tablet take 1 tablet by mouth twice a day 60 tablet 11 
   metoprolol tartrate (LOPRESSOR) 25 mg tablet Take 1 tablet by mouth 2 times daily. 60 t
ablet 11 
   omeprazole (PRILOSEC) 20 mg capsule take 1 capsule by mouth once daily ON AN EMPTY STOM
ACH 90 capsule 3 
   Prenatal Vit-Fe Fumarate-FA (PNV PRENATAL PLUS MULTIVITAMIN) 27-1 MG TABS take 1 tablet
 by mouth once daily 30 tablet 11 
   Respiratory Therapy Supplies MISC Decrease CPAP to 12-18 cmH2O Diagnosis Code(s)327.23.
 Please send order to [facility]. 1 each 0 
   rosuvastatin (CRESTOR) 20 mg tablet take 1 tablet by mouth NIGHTLY 30 tablet 11 
   SENSIPAR 30 MG tablet take 1 tablet by mouth once daily 30 tablet 11 
 
 Review of Systems
 
 Objective: 
 /80  | Temp 36 C (96.8 F)  | Wt 117.1 kg (258 lb 2.5 oz)  | BMI 41.67 kg/m 
 
 Physical Exam 
 HEENT: No thrush.
 Heart:  Regular rate and rhythm with no S3, S4, murmur or rub.
 Lungs:  CTA bilaterally.  No rales or wheezes.
 Abdomen:  soft, obese, the renal allograft in the RLQ is nontender,  NABS.
 Extremities: no edema,  clubbing, cyanosis, no foot ulcers.
 
 Lab Results 
 Component Value Date 
  NAEX 139 2018 
  KEX 4.5 2018 
  CLEX 104 2018 
  CO2EX 17 (A) 2018 
  BUNEX 48 (A) 2018 
  CREEX 1.54 (A) 2018 
  EGFREX 33 2018 
  GLUEX 87 2018 
  CAEX 10.4 (A) 2018 
  PHOSEX 3.3 2018 
  PTHEX 193.0 (A) 2016 
  AVC7ITE 7.1 2018 
 
 
 Lab Results 
 Component Value Date 
  WBCEX 7.1 2018 
  HGBEX 14.7 2018 
  HCTEX 45.2 (A) 2018 
  PLTEX 135 (A) 2018 
 
 Lab Results 
 Component Value Date 
  PROTEX 0.7 (A) 2018 
  
 
 Lab Results 
 Component Value Date 
  UAEX negative 2018 
  UAEX normal 2018 
  UAEX negative 2018 
  UAEX 5 2018 
  UAEX negative 2018 
  UAEX 0 2018 
  UAEX 1.010 2018 
  UAEX 500 2018 
 
 Assessment: 
 
 1.  Renal Allograft, 05 -- her Scr is above her previous baseline.
 2.  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
 3.  Type 2 DM, requiring insuline-- good control.
 4.  Hyperlipidemia--on statin Rx
 5.  Hypertension--good control on her 2 agents.
 6.  SHPTH--she needs repeat iPTH.
 7.  Obesity/JACK/Pulmonary hypertension-- compensated.
 8.  CAD, s/p CABG  3 vessels, --stable on ASA, metoprolol, atorvastatin.
 9.  Type IV RTA--stable.
 10. Chronic Low Back pain--in remission.
 11.   DJD, left knee--about same.
  
 
 Plan: 
 
 1.   Her UA revealed some pyuria and reflexed to culture which showed >10-fifth E. coli.
 2.  The organism appears pansensitive.  Will give her amoxicillin 500 mg BID,  7 days.
 3.   Her tacro level is above goal.  Will have her decrease the tacrolimus to 1.5 mg, A.M.,
 and 1 mg, PM, to target the level 5-7  ng/ml.
 4.   I asked to to repeat her standing order and drug level in one week after that.
 5.  Will plan to see her back in 6 months at the [facility].  After the 
next lab check, she will continue to her  Standing Order Q 4 months.
 
 Electronically signed by: Marzena Moser DO on 2018 at 18:18
 
 CC:    Jose David Dalal M.D., [facility], [facility]
            Edgar Sanders MD, [facility], Orthopedics
 
 Electronically signed by Marzena Moser DO at 2018  6:41 PM PDTdocumented in thi
s encounter
 
 Plan of Treatment
 
 
+--------+-----------+------------+----------------------+-------------------+
 
| Date   | Type      | Specialty  | Care Team            | Description       |
+--------+-----------+------------+----------------------+-------------------+
| / | Office    | Cardiology |   Arlen Wilsona,    |                   |
|    | Visit     |            | [address]   |                   |
|        |           |            | St  [place], [place]  |                   |
|        |           |            | [address]  [phone]  |                   |
|        |           |            |  [phone] (Fax)  |                   |
+--------+-----------+------------+----------------------+-------------------+
| / | Hospital  | Radiology  |   Popeye Townsend, |                   |
|    | Encounter |            |  MD  [address] |                   |
|        |           |            |  [place],   |                   |
|        |           |            | [place] [address]             |                   |
|        |           |            | [phone]         |                   |
|        |           |            | [phone] (Fax)   |                   |
+--------+-----------+------------+----------------------+-------------------+
| / | Surgery   | Radiology  |   Popeye Townsend, | CV EP PPM SYSTEM  |
|    |           |            |  MD  [address] | IMPLANT           |
|        |           |            |  St.  [place],   |                   |
|        |           |            | [place] [address]             |                   |
|        |           |            | [phone]         |                   |
|        |           |            | [phone] (Fax)   |                   |
+--------+-----------+------------+----------------------+-------------------+
| 02/10/ | Clinical  | Cardiology |                      |                   |
|    | Support   |            |                      |                   |
+--------+-----------+------------+----------------------+-------------------+
| / | Office    | Cardiology |   KatieTonia,    |                   |
|    | Visit     |            | POP  [address]   |                   |
|        |           |            | [place]  |                   |
|        |           |            | [address] [phone]  |                   |
|        |           |            |  [phone] (Fax)  |                   |
+--------+-----------+------------+----------------------+-------------------+
| / | Off-Site  | Nephrology |   Marzena Moser  |                   |
|    | Visit     |            | DO [place]  [address] [place] [address]      |                   |
|        |           |            | [address]      |                   |
|        |           |            | [place]      |                   |
|        |           |            | [address]  [phone]  |                   |
|        |           |            |  [phone] (Fax)  |                   |
+--------+-----------+------------+----------------------+-------------------+
 documented as of this encounter
 
 Procedures
 
 
+----------------+--------+------------+----------------------+----------------------+
| Procedure Name | Priori | Date/Time  | Associated Diagnosis | Comments             |
|                | ty     |            |                      |                      |
+----------------+--------+------------+----------------------+----------------------+
| EXTERNAL LAB:  | Routin | 2018 |   Kidney replaced by |   Results for this   |
| HEMOGLOBIN A1C | e      |            |  transplant          | procedure are in the |
|                |        |            | Hypertension,        |  results section.    |
|                |        |            | essential  Type 2 DM |                      |
|                |        |            |  with CKD stage 2    |                      |
|                |        |            | and hypertension     |                      |
|                |        |            | (HCC)  Acute         |                      |
|                |        |            | cystitis without     |                      |
|                |        |            | hematuria            |                      |
+----------------+--------+------------+----------------------+----------------------+
 documented in this encounter
 
 Results
 
 External Lab: Hemoglobin A1c (2018)
 
+-------------+-------+-----------+------------+--------------+
| Component   | Value | Ref Range | Performed  | Pathologist  |
|             |       |           | At         | Signature    |
+-------------+-------+-----------+------------+--------------+
| Hemoglobin  | 7.1   | %         |            |              |
| A1c,        |       |           |            |              |
| external    |       |           |            |              |
+-------------+-------+-----------+------------+--------------+
 
 
 
+----------+
| Specimen |
+----------+
| Blood    |
+----------+
 documented in this encounter
 
 Visit Diagnoses
 
 
+---------------------------------------------------------------+
| Diagnosis                                                     |
+---------------------------------------------------------------+
|   Kidney replaced by transplant - Primary                     |
+---------------------------------------------------------------+
|   Hypertension, essential  Unspecified essential hypertension |
+---------------------------------------------------------------+
|   Type 2 DM with CKD stage 2 and hypertension (HCC)           |
+---------------------------------------------------------------+
|   Acute cystitis without hematuria  Acute cystitis            |
+---------------------------------------------------------------+
 documented in this encounter

## 2020-01-08 NOTE — XMS
Encounter Summary
  Created on: 2020
 
 Viviana Ricci
 External Reference #: [id number]
 : 47
 Sex: Female
 
 Demographics
 
 
+-----------------------+----------------------+
| Address               | [address]      |
|                       | [place]  [address] |
+-----------------------+----------------------+
| Home Phone            | [phone]      |
+-----------------------+----------------------+
| Preferred Language    | Unknown              |
+-----------------------+----------------------+
| Marital Status        | Single               |
+-----------------------+----------------------+
| Adventism Affiliation | [id number]                 |
+-----------------------+----------------------+
| Race                  | Unknown              |
+-----------------------+----------------------+
| Ethnic Group          | Unknown              |
+-----------------------+----------------------+
 
 
 Author
 
 
+--------------+--------------------------------------------+
| Author       | [facility]  |
|              | and [place]                                |
+--------------+--------------------------------------------+
| Organization | [facility]  |
|              | and [place]                                |
+--------------+--------------------------------------------+
| Address      | Unknown                                    |
+--------------+--------------------------------------------+
| Phone        | Unavailable                                |
+--------------+--------------------------------------------+
 
 
 
 Support
 
 
+----------------+--------------+---------------------+-----------------+
| Name           | Relationship | Address             | Phone           |
+----------------+--------------+---------------------+-----------------+
| Ada/Ed Radhames | ECON         | [address]              | [phone] |
|                |              | [place]  |                 |
|                |              |  [address]              |                 |
+----------------+--------------+---------------------+-----------------+
 
 
 
 
 Care Team Providers
 
 
+-----------------------+------+-------------+
| Care Team Member Name | Role | Phone       |
+-----------------------+------+-------------+
 PCP  | Unavailable |
+-----------------------+------+-------------+
 
 
 
 Encounter Details
 
 
+--------+-----------+----------------------+----------------------+-------------+
| Date   | Type      | Department           | Care Team            | Description |
+--------+-----------+----------------------+----------------------+-------------+
| / | [facility]  |   [facility] |   Marzena Moser  |             |
|    | Encounter |  MED CTR XRAY  [address] | M, DO  [address]      |             |
|        |           |  [place]       | [address]      |             |
|        |           | [place] [address] | [place]      |             |
|        |           |   [phone]       | [address] [phone]  |             |
|        |           |                      |  [phone] (Fax)  |             |
+--------+-----------+----------------------+----------------------+-------------+
 
 
 
 Social History
 
 
+----------------+-------+-----------+--------+------+
| Tobacco Use    | Types | Packs/Day | Years  | Date |
|                |       |           | Used   |      |
+----------------+-------+-----------+--------+------+
| Never Assessed |       |           |        |      |
+----------------+-------+-----------+--------+------+
 
 
 
+------------------+---------------+
| Sex Assigned at  | Date Recorded |
| Birth            |               |
+------------------+---------------+
| Not on file      |               |
+------------------+---------------+
 
 
 
+----------------+-------------+-------------+
| Job Start Date | Occupation  | Industry    |
+----------------+-------------+-------------+
| Not on file    | Not on file | Not on file |
+----------------+-------------+-------------+
 
 
 
+----------------+--------------+------------+
| Travel History | Travel Start | Travel End |
+----------------+--------------+------------+
 
 
 
 
+-------------------------------------+
| No recent travel history available. |
+-------------------------------------+
 documented as of this encounter
 
 Plan of Treatment
 
 
+--------+-----------+------------+----------------------+-------------------+
| Date   | Type      | Specialty  | Care Team            | Description       |
+--------+-----------+------------+----------------------+-------------------+
| / | Office    | Cardiology |   Tonia Wilson,    |                   |
|    | Visit     |            | ARNJESSICA  [address]   |                   |
|        |           |            | St  [place], [place]  |                   |
|        |           |            | [address]  [phone]  |                   |
|        |           |            |  [phone] (Fax)  |                   |
+--------+-----------+------------+----------------------+-------------------+
| / | Hospital  | Radiology  |   Popeye Townsend, |                   |
|    | Encounter |            |  MD  [address] |                   |
|        |           |            |  St. [place],   |                   |
|        |           |            | [place] [address]             |                   |
|        |           |            | [phone]         |                   |
|        |           |            | [phone] (Fax)   |                   |
+--------+-----------+------------+----------------------+-------------------+
| / | Surgery   | Radiology  |   Popeye Townsend, | CV EP PPM SYSTEM  |
|    |           |            |  MD  [address] | IMPLANT           |
|        |           |            |  [place],   |                   |
|        |           |            | [place] [address]             |                   |
|        |           |            | [phone]         |                   |
|        |           |            | [phone] (Fax)   |                   |
+--------+-----------+------------+----------------------+-------------------+
| 02/10/ | Clinical  | Cardiology |                      |                   |
|    | Support   |            |                      |                   |
+--------+-----------+------------+----------------------+-------------------+
| / | Office    | Cardiology |   Tonia Wilson,    |                   |
|    | Visit     |            | BELKIS  [address]   |                   |
|        |           |            | [place]  |                   |
|        |           |            | [address]  [phone]  |                   |
|        |           |            |  [phone] (Fax)  |                   |
+--------+-----------+------------+----------------------+-------------------+
| / | Off-Site  | Nephrology |   Marzena Moser  |                   |
|    | Visit     |            | DO ETIENNE  [address] [place] [address]      |                   |
|        |           |            | [address]      |                   |
|        |           |            | [place]      |                   |
|        |           |            | [address] [phone]  |                   |
|        |           |            |  [phone] (Fax)  |                   |
+--------+-----------+------------+----------------------+-------------------+
 documented as of this encounter
 
 Visit Diagnoses
 Not on filedocumented in this encounter

## 2020-01-08 NOTE — XMS
Encounter Summary
  Created on: 2020
 
 Viviana Ricci
 External Reference #: [id number]
 : 47
 Sex: Female
 
 Demographics
 
 
+-----------------------+----------------------+
| Address               | [address]      |
|                       | [place]  [address] |
+-----------------------+----------------------+
| Home Phone            | [phone]      |
+-----------------------+----------------------+
| Preferred Language    | Unknown              |
+-----------------------+----------------------+
| Marital Status        | Single               |
+-----------------------+----------------------+
| [employer] Affiliation | [id number]                 |
+-----------------------+----------------------+
| Race                  | Unknown              |
+-----------------------+----------------------+
| Ethnic Group          | Unknown              |
+-----------------------+----------------------+
 
 
 Author
 
 
+--------------+--------------------------------------------+
| Author       | [facility]  |
|              | and [place]                                |
+--------------+--------------------------------------------+
| Organization | [facility]  |
|              | and [place]                                |
+--------------+--------------------------------------------+
| Address      | Unknown                                    |
+--------------+--------------------------------------------+
| Phone        | Unavailable                                |
+--------------+--------------------------------------------+
 
 
 
 Support
 
 
+----------------+--------------+---------------------+-----------------+
| Name           | Relationship | Address             | Phone           |
+----------------+--------------+---------------------+-----------------+
| Ada/Ed Radhames | ECON         | [address]              | [phone] |
|                |              | [place]  |                 |
|                |              |  [address]              |                 |
+----------------+--------------+---------------------+-----------------+
 
 
 
 
 Care Team Providers
 
 
+------------------------+------+-----------------+
| Care Team Member Name  | Role | Phone           |
+------------------------+------+-----------------+
| Marzena Moser DO | PCP  | [phone] |
+------------------------+------+-----------------+
 
 
 
 Reason for Visit
 
 
+-------------------+----------+
| Reason            | Comments |
+-------------------+----------+
| Medication Refill |          |
+-------------------+----------+
 
 
 
 Encounter Details
 
 
+--------+--------+---------------------+----------------------+-------------------+
| Date   | Type   | Department          | Care Team            | Description       |
+--------+--------+---------------------+----------------------+-------------------+
| / | Refill |   [facility]         |   Marzena Moser  | Medication Refill |
| 2017   |        | NEPHROLOGY  [address]   | M, DO  [address]      |                   |
|        |        | [address]   | [address]      |                   |
|        |        | [place], [place]     | [place], [place]      |                   |
|        |        | [address]          | [address]  [phone]  |                   |
|        |        | [phone]        |  [phone] (Fax)  |                   |
+--------+--------+---------------------+----------------------+-------------------+
 
 
 
 Social History
 
 
+--------------+-------+-----------+--------+------+
| Tobacco Use  | Types | Packs/Day | Years  | Date |
|              |       |           | Used   |      |
+--------------+-------+-----------+--------+------+
| Never Smoker |       |           |        |      |
+--------------+-------+-----------+--------+------+
 
 
 
+---------------------+---+---+---+
| Smokeless Tobacco:  |   |   |   |
| Never Used          |   |   |   |
+---------------------+---+---+---+
 
 
 
+---------------------------------------------------------------+
| Comments: some second hand smoke exposure, but fairly minimal |
 
+---------------------------------------------------------------+
 
 
 
+-------------+-------------+---------+----------+
| Alcohol Use | Drinks/Week | oz/Week | Comments |
+-------------+-------------+---------+----------+
| No          |             |         |          |
+-------------+-------------+---------+----------+
 
 
 
+------------------+---------------+
| Sex Assigned at  | Date Recorded |
| Birth            |               |
+------------------+---------------+
| Not on file      |               |
+------------------+---------------+
 
 
 
+----------------+-------------+-------------+
| Job Start Date | Occupation  | Industry    |
+----------------+-------------+-------------+
| Not on file    | Not on file | Not on file |
+----------------+-------------+-------------+
 
 
 
+----------------+--------------+------------+
| Travel History | Travel Start | Travel End |
+----------------+--------------+------------+
 
 
 
+-------------------------------------+
| No recent travel history available. |
+-------------------------------------+
 documented as of this encounter
 
 Plan of Treatment
 
 
+--------+-----------+------------+----------------------+-------------------+
| Date   | Type      | Specialty  | Care Team            | Description       |
+--------+-----------+------------+----------------------+-------------------+
| / | Office    | Cardiology |   Tonia Wilson,    |                   |
|    | Visit     |            | ARNP  [address]   |                   |
|        |           |            | St [place], [place]  |                   |
|        |           |            | [address]  [phone]  |                   |
|        |           |            |  [phone] (Fax)  |                   |
+--------+-----------+------------+----------------------+-------------------+
| / | Hospital  | Radiology  |   Popeye Townsend, |                   |
|    | Encounter |            |  MD  [address] |                   |
|        |           |            |  [place],   |                   |
|        |           |            | [place] [address]             |                   |
|        |           |            | [phone]         |                   |
|        |           |            | [phone] (Fax)   |                   |
+--------+-----------+------------+----------------------+-------------------+
| / | Surgery   | Radiology  |   Popeye Townsend, | CV EP PPM SYSTEM  |
 
|    |           |            |  MD  [address] | IMPLANT           |
|        |           |            |  [place],   |                   |
|        |           |            | [place] [address]             |                   |
|        |           |            | [phone]         |                   |
|        |           |            | [phone] (Fax)   |                   |
+--------+-----------+------------+----------------------+-------------------+
| 02/10/ | Clinical  | Cardiology |                      |                   |
|    | Support   |            |                      |                   |
+--------+-----------+------------+----------------------+-------------------+
| / | Office    | Cardiology |   RakeshTonia andre,    |                   |
|    | Visit     |            | ARNP  [address]   |                   |
|        |           |            | [place]  |                   |
|        |           |            | [address] [phone]  |                   |
|        |           |            |  [phone] (Fax)  |                   |
+--------+-----------+------------+----------------------+-------------------+
| / | Off-Site  | Nephrology |   Marzena Moser  |                   |
|    | Visit     |            | DO ETIENNE  [address] [place] [address]      |                   |
|        |           |            | [address]      |                   |
|        |           |            | [place]      |                   |
|        |           |            | [address] [phone]  |                   |
|        |           |            |  [phone] (Fax)  |                   |
+--------+-----------+------------+----------------------+-------------------+
 documented as of this encounter
 
 Visit Diagnoses
 Not on filedocumented in this encounter

## 2020-01-08 NOTE — XMS
Encounter Summary
  Created on: 2020
 
 Viviana Ricci
 External Reference #: [id number]
 : 47
 Sex: Female
 
 Demographics
 
 
+-----------------------+----------------------+
| Address               | [address]      |
|                       | [place]  [address] |
+-----------------------+----------------------+
| Home Phone            | [phone]      |
+-----------------------+----------------------+
| Preferred Language    | Unknown              |
+-----------------------+----------------------+
| Marital Status        | Single               |
+-----------------------+----------------------+
| Ep[employer] Affiliation | 1009                 |
+-----------------------+----------------------+
| Race                  | Unknown              |
+-----------------------+----------------------+
| Ethnic Group          | Unknown              |
+-----------------------+----------------------+
 
 
 Author
 
 
+--------------+--------------------------------------------+
| Author       | [facility]  |
|              | and [place]                                |
+--------------+--------------------------------------------+
| Organization | [facility]  |
|              | and [place]                                |
+--------------+--------------------------------------------+
| Address      | Unknown                                    |
+--------------+--------------------------------------------+
| Phone        | Unavailable                                |
+--------------+--------------------------------------------+
 
 
 
 Support
 
 
+----------------+--------------+---------------------+-----------------+
| Name           | Relationship | Address             | Phone           |
+----------------+--------------+---------------------+-----------------+
| Ada/Ed Radhames | ECON         | [address]              | [phone] |
|                |              | [place]  |                 |
|                |              |  [address]              |                 |
+----------------+--------------+---------------------+-----------------+
 
 
 
 
 Care Team Providers
 
 
+-----------------------+------+-------------+
| Care Team Member Name | Role | Phone       |
+-----------------------+------+-------------+
 PCP  | Unavailable |
+-----------------------+------+-------------+
 
 
 
 Encounter Details
 
 
+--------+-----------+----------------------+----------------------+-------------+
| Date   | Type      | Department           | Care Team            | Description |
+--------+-----------+----------------------+----------------------+-------------+
| / | [facility]  |   [facility] |   Marzena Moser  |             |
|    | Encounter |  MED CTR XRAY  [address] | M, DO  [address]      |             |
|        |           |  [place]       | [address]      |             |
|        |           | [place] [address] | [place]      |             |
|        |           |   [phone]       | [address] [phone]  |             |
|        |           |                      |  [phone] (Fax)  |             |
+--------+-----------+----------------------+----------------------+-------------+
 
 
 
 Social History
 
 
+----------------+-------+-----------+--------+------+
| Tobacco Use    | Types | Packs/Day | Years  | Date |
|                |       |           | Used   |      |
+----------------+-------+-----------+--------+------+
| Never Assessed |       |           |        |      |
+----------------+-------+-----------+--------+------+
 
 
 
+------------------+---------------+
| Sex Assigned at  | Date Recorded |
| Birth            |               |
+------------------+---------------+
| Not on file      |               |
+------------------+---------------+
 
 
 
+----------------+-------------+-------------+
| Job Start Date | Occupation  | Industry    |
+----------------+-------------+-------------+
| Not on file    | Not on file | Not on file |
+----------------+-------------+-------------+
 
 
 
+----------------+--------------+------------+
| Travel History | Travel Start | Travel End |
+----------------+--------------+------------+
 
 
 
 
+-------------------------------------+
| No recent travel history available. |
+-------------------------------------+
 documented as of this encounter
 
 Plan of Treatment
 
 
+--------+-----------+------------+----------------------+-------------------+
| Date   | Type      | Specialty  | Care Team            | Description       |
+--------+-----------+------------+----------------------+-------------------+
| / | Office    | Cardiology |   Tonia Wilson,    |                   |
|    | Visit     |            | ARNJESSICA  [address]   |                   |
|        |           |            | St  [place], [place]  |                   |
|        |           |            | [address]  [phone]  |                   |
|        |           |            |  [phone] (Fax)  |                   |
+--------+-----------+------------+----------------------+-------------------+
| / | Hospital  | Radiology  |   Popeye Townsend, |                   |
|    | Encounter |            |  MD  [address] |                   |
|        |           |            |  [place],   |                   |
|        |           |            | [place] [address]             |                   |
|        |           |            | [phone]         |                   |
|        |           |            | [phone] (Fax)   |                   |
+--------+-----------+------------+----------------------+-------------------+
| / | Surgery   | Radiology  |   Popeye Townsend, | CV EP PPM SYSTEM  |
|    |           |            |  MD  [address] | IMPLANT           |
|        |           |            |  [place],   |                   |
|        |           |            | [place] [address]             |                   |
|        |           |            | [phone]         |                   |
|        |           |            | [phone] (Fax)   |                   |
+--------+-----------+------------+----------------------+-------------------+
| 02/10/ | Clinical  | Cardiology |                      |                   |
|    | Support   |            |                      |                   |
+--------+-----------+------------+----------------------+-------------------+
| / | Office    | Cardiology |   Tonia Wilson,    |                   |
|    | Visit     |            | BELKIS  [address]   |                   |
|        |           |            | [place]  |                   |
|        |           |            | [address]  [phone]  |                   |
|        |           |            |  [phone] (Fax)  |                   |
+--------+-----------+------------+----------------------+-------------------+
| / | Off-Site  | Nephrology |   Marzena Moser  |                   |
|    | Visit     |            | DO ETIENNE  [address] [place] [address]      |                   |
|        |           |            | [address]      |                   |
|        |           |            | [place]      |                   |
|        |           |            | [address] [phone]  |                   |
|        |           |            |  [phone] (Fax)  |                   |
+--------+-----------+------------+----------------------+-------------------+
 documented as of this encounter
 
 Visit Diagnoses
 Not on filedocumented in this encounter

## 2020-01-08 NOTE — XMS
Encounter Summary
  Created on: 2020
 
 Viviana Ricci
 External Reference #: [id number]
 : 47
 Sex: Female
 
 Demographics
 
 
+-----------------------+------------------------+
| Address               | [address]           |
|                       | [place]  [address]   |
+-----------------------+------------------------+
| Home Phone            | [phone]        |
+-----------------------+------------------------+
| Preferred Language    | Unknown                |
+-----------------------+------------------------+
| Marital Status        | Single                 |
+-----------------------+------------------------+
| Mormonism Affiliation | CAT                    |
+-----------------------+------------------------+
| Race                  | White                  |
+-----------------------+------------------------+
| Ethnic Group          | Not  or  |
+-----------------------+------------------------+
 
 
 Author
 
 
+--------------+------------------------------+
| Author       | [facility] |
+--------------+------------------------------+
| Organization | [facility] |
+--------------+------------------------------+
| Address      | Unknown                      |
+--------------+------------------------------+
| Phone        | Unavailable                  |
+--------------+------------------------------+
 
 
 
 Support
 
 
+---------------+--------------+-----------------+-----------------+
| Name          | Relationship | Address         | Phone           |
+---------------+--------------+-----------------+-----------------+
| Ember Ricci | MARILOU         | [place]   | [phone] |
+---------------+--------------+-----------------+-----------------+
 
 
 
 Care Team Providers
 
 
 
+-----------------------+------+-------------+
| Care Team Member Name | Role | Phone       |
+-----------------------+------+-------------+
 PCP  | Unavailable |
+-----------------------+------+-------------+
 
 
 
 Encounter Details
 
 
+--------+-------------+----------------------+---------------------+-------------+
| Date   | Type        | Department           | Care Team           | Description |
+--------+-------------+----------------------+---------------------+-------------+
| / | Documentati |   Clinical           |   Kenny Lara,  |             |
|    | on          | Transplant Services  | MD  [address]     |             |
|        |             |  [address] | [address]     |             |
|        |             |  [place]  [place],  | [place], [place]        |             |
|        |             | [place] [address]        | [address]          |             |
|        |             | [phone]         | [phone]        |             |
|        |             |                      | [phone] (Fax)  |             |
+--------+-------------+----------------------+---------------------+-------------+
 
 
 
 Social History
 
 
+----------------+-------+-----------+--------+------+
| Tobacco Use    | Types | Packs/Day | Years  | Date |
|                |       |           | Used   |      |
+----------------+-------+-----------+--------+------+
| Never Assessed |       |           |        |      |
+----------------+-------+-----------+--------+------+
 
 
 
+------------------+---------------+
| Sex Assigned at  | Date Recorded |
| Birth            |               |
+------------------+---------------+
| Not on file      |               |
+------------------+---------------+
 
 
 
+----------------+-------------+-------------+
| Job Start Date | Occupation  | Industry    |
+----------------+-------------+-------------+
| Not on file    | Not on file | Not on file |
+----------------+-------------+-------------+
 
 
 
+----------------+--------------+------------+
| Travel History | Travel Start | Travel End |
+----------------+--------------+------------+
 
 
 
 
+-------------------------------------+
| No recent travel history available. |
+-------------------------------------+
 documented as of this encounter
 
 Plan of Treatment
 Not on filedocumented as of this encounter
 
 Visit Diagnoses
 Not on filedocumented in this encounter

## 2020-01-08 NOTE — XMS
Encounter Summary
  Created on: 2020
 
 Viviana Ricci
 External Reference #: [id number]
 : 47
 Sex: Female
 
 Demographics
 
 
+-----------------------+----------------------+
| Address               | [address]      |
|                       | [place]  [address] |
+-----------------------+----------------------+
| Home Phone            | [phone]      |
+-----------------------+----------------------+
| Preferred Language    | Unknown              |
+-----------------------+----------------------+
| Marital Status        | Single               |
+-----------------------+----------------------+
| Restoration Affiliation | [id number]                 |
+-----------------------+----------------------+
| Race                  | Unknown              |
+-----------------------+----------------------+
| Ethnic Group          | Unknown              |
+-----------------------+----------------------+
 
 
 Author
 
 
+--------------+--------------------------------------------+
| Author       | [facility]  |
|              | and [place]                                |
+--------------+--------------------------------------------+
| Organization | [facility]  |
|              | and [place]                                |
+--------------+--------------------------------------------+
| Address      | Unknown                                    |
+--------------+--------------------------------------------+
| Phone        | Unavailable                                |
+--------------+--------------------------------------------+
 
 
 
 Support
 
 
+----------------+--------------+---------------------+-----------------+
| Name           | Relationship | Address             | Phone           |
+----------------+--------------+---------------------+-----------------+
| Ada/Ed Radhames | ECON         | [place]              | [phone] |
|                |              | [place]  |                 |
|                |              |  [address]              |                 |
+----------------+--------------+---------------------+-----------------+
 
 
 
 
 Care Team Providers
 
 
+-----------------------+------+-------------+
| Care Team Member Name | Role | Phone       |
+-----------------------+------+-------------+
 PCP  | Unavailable |
+-----------------------+------+-------------+
 
 
 
 Reason for Visit
 
 
+-------------------+----------+
| Reason            | Comments |
+-------------------+----------+
| Medication Refill |          |
+-------------------+----------+
 
 
 
 Encounter Details
 
 
+--------+--------+---------------------+----------------------+-------------------+
| Date   | Type   | Department          | Care Team            | Description       |
+--------+--------+---------------------+----------------------+-------------------+
| / | Refill |   [facility]         |   Marzena Moser  | Medication Refill |
|    |        | NEPHROLOGY  [address]   | M, DO  [address]      |                   |
|        |        | [address]   | [address]      |                   |
|        |        | [place], [place]     | [place], [place]      |                   |
|        |        | [address]          | [address]  [phone]  |                   |
|        |        | [phone]        |  [phone] (Fax)  |                   |
+--------+--------+---------------------+----------------------+-------------------+
 
 
 
 Social History
 
 
+--------------+-------+-----------+--------+------+
| Tobacco Use  | Types | Packs/Day | Years  | Date |
|              |       |           | Used   |      |
+--------------+-------+-----------+--------+------+
| Never Smoker |       |           |        |      |
+--------------+-------+-----------+--------+------+
 
 
 
+---------------------+---+---+---+
| Smokeless Tobacco:  |   |   |   |
| Never Used          |   |   |   |
+---------------------+---+---+---+
 
 
 
+---------------------------------------------------------------+
| Comments: some second hand smoke exposure, but fairly minimal |
 
+---------------------------------------------------------------+
 
 
 
+-------------+-------------+---------+----------+
| Alcohol Use | Drinks/Week | oz/Week | Comments |
+-------------+-------------+---------+----------+
| No          |             |         |          |
+-------------+-------------+---------+----------+
 
 
 
+------------------+---------------+
| Sex Assigned at  | Date Recorded |
| Birth            |               |
+------------------+---------------+
| Not on file      |               |
+------------------+---------------+
 
 
 
+----------------+-------------+-------------+
| Job Start Date | Occupation  | Industry    |
+----------------+-------------+-------------+
| Not on file    | Not on file | Not on file |
+----------------+-------------+-------------+
 
 
 
+----------------+--------------+------------+
| Travel History | Travel Start | Travel End |
+----------------+--------------+------------+
 
 
 
+-------------------------------------+
| No recent travel history available. |
+-------------------------------------+
 documented as of this encounter
 
 Plan of Treatment
 
 
+--------+-----------+------------+----------------------+-------------------+
| Date   | Type      | Specialty  | Care Team            | Description       |
+--------+-----------+------------+----------------------+-------------------+
| / | Office    | Cardiology |   HellalfredoTonia,    |                   |
|    | Visit     |            | ARNP  [address]   |                   |
|        |           |            | St  [place], [place]  |                   |
|        |           |            | [address]  [phone]  |                   |
|        |           |            |  [phone] (Fax)  |                   |
+--------+-----------+------------+----------------------+-------------------+
| / | Hospital  | Radiology  |   Popeye Townsend, |                   |
|    | Encounter |            |  MD  [address] |                   |
|        |           |            |  St.  [place],   |                   |
|        |           |            | [place] [address]             |                   |
|        |           |            | [phone]         |                   |
|        |           |            | [phone] (Fax)   |                   |
+--------+-----------+------------+----------------------+-------------------+
| / | Surgery   | Radiology  |   Popeye Townsend, | CV EP PPM SYSTEM  |
 
|    |           |            |  MD  [address] | IMPLANT           |
|        |           |            |  St.  [place],   |                   |
|        |           |            | [place] [address]             |                   |
|        |           |            | [phone]         |                   |
|        |           |            | [phone] (Fax)   |                   |
+--------+-----------+------------+----------------------+-------------------+
| 02/10/ | Clinical  | Cardiology |                      |                   |
|    | Support   |            |                      |                   |
+--------+-----------+------------+----------------------+-------------------+
| / | Office    | Cardiology |   Tonia Wilson,    |                   |
|    | Visit     |            | ARNP  [address]   |                   |
|        |           |            | [place]  |                   |
|        |           |            | [address]  [phone]  |                   |
|        |           |            |  [phone] (Fax)  |                   |
+--------+-----------+------------+----------------------+-------------------+
| / | Off-Site  | Nephrology |   Marzena Moser  |                   |
|    | Visit     |            | DO ETIENNE  [address] [place] [address]      |                   |
|        |           |            | [address]      |                   |
|        |           |            | [place]      |                   |
|        |           |            | [address]  [phone]  |                   |
|        |           |            |  [phone] (Fax)  |                   |
+--------+-----------+------------+----------------------+-------------------+
 documented as of this encounter
 
 Visit Diagnoses
 Not on filedocumented in this encounter

## 2020-01-08 NOTE — XMS
Encounter Summary
  Created on: 2020
 
 Viviana Ricci
 External Reference #: [id number]
 : 47
 Sex: Female
 
 Demographics
 
 
+-----------------------+----------------------+
| Address               | [address]      |
|                       | [place]  [address] |
+-----------------------+----------------------+
| Home Phone            | [phone]      |
+-----------------------+----------------------+
| Preferred Language    | Unknown              |
+-----------------------+----------------------+
| Marital Status        | Single               |
+-----------------------+----------------------+
| Episcopal Affiliation | [id number]                 |
+-----------------------+----------------------+
| Race                  | Unknown              |
+-----------------------+----------------------+
| Ethnic Group          | Unknown              |
+-----------------------+----------------------+
 
 
 Author
 
 
+--------------+--------------------------------------------+
| Author       | [facility]  |
|              | and [facility]                                |
+--------------+--------------------------------------------+
| Organization | [facility]  |
|              | and [facility]                                |
+--------------+--------------------------------------------+
| Address      | Unknown                                    |
+--------------+--------------------------------------------+
| Phone        | Unavailable                                |
+--------------+--------------------------------------------+
 
 
 
 Support
 
 
+----------------+--------------+---------------------+-----------------+
| Name           | Relationship | Address             | Phone           |
+----------------+--------------+---------------------+-----------------+
| Ada/Ed Radhames | ECON         | [place]              | [phone] |
|                |              | [place]  |                 |
|                |              |  [address]              |                 |
+----------------+--------------+---------------------+-----------------+
 
 
 
 
 Care Team Providers
 
 
+------------------------+------+-----------------+
| Care Team Member Name  | Role | Phone           |
+------------------------+------+-----------------+
| Marzena Moser DO | PCP  | [phone] |
+------------------------+------+-----------------+
 
 
 
 Reason for Visit
 
 
+-------------+----------+
| Reason      | Comments |
+-------------+----------+
| Lab Results |          |
+-------------+----------+
 
 
 
 Encounter Details
 
 
+--------+-----------+---------------------+----------------------+-------------+
| Date   | Type      | Department          | Care Team            | Description |
+--------+-----------+---------------------+----------------------+-------------+
| / | Telephone |   [facility]         |   Marzena Moser  | Lab Results |
| 2018   |           | NEPHROLOGY  [address]   | M, DO  [address]      |             |
|        |           | [address]   | [address]      |             |
|        |           | [place], [place]     | [place], [place]      |             |
|        |           | [address]          | [address]  [phone]  |             |
|        |           | [phone]        |  [phone] (Fax)  |             |
+--------+-----------+---------------------+----------------------+-------------+
 
 
 
 Social History
 
 
+--------------+-------+-----------+--------+------+
| Tobacco Use  | Types | Packs/Day | Years  | Date |
|              |       |           | Used   |      |
+--------------+-------+-----------+--------+------+
| Never Smoker |       |           |        |      |
+--------------+-------+-----------+--------+------+
 
 
 
+---------------------+---+---+---+
| Smokeless Tobacco:  |   |   |   |
| Never Used          |   |   |   |
+---------------------+---+---+---+
 
 
 
+---------------------------------------------------------------+
| Comments: some second hand smoke exposure, but fairly minimal |
 
+---------------------------------------------------------------+
 
 
 
+-------------+-------------+---------+----------+
| Alcohol Use | Drinks/Week | oz/Week | Comments |
+-------------+-------------+---------+----------+
| No          |             |         |          |
+-------------+-------------+---------+----------+
 
 
 
+------------------+---------------+
| Sex Assigned at  | Date Recorded |
| Birth            |               |
+------------------+---------------+
| Not on file      |               |
+------------------+---------------+
 
 
 
+----------------+-------------+-------------+
| Job Start Date | Occupation  | Industry    |
+----------------+-------------+-------------+
| Not on file    | Not on file | Not on file |
+----------------+-------------+-------------+
 
 
 
+----------------+--------------+------------+
| Travel History | Travel Start | Travel End |
+----------------+--------------+------------+
 
 
 
+-------------------------------------+
| No recent travel history available. |
+-------------------------------------+
 documented as of this encounter
 
 Plan of Treatment
 
 
+--------+-----------+------------+----------------------+-------------------+
| Date   | Type      | Specialty  | Care Team            | Description       |
+--------+-----------+------------+----------------------+-------------------+
| / | Office    | Cardiology |   Tonia Wilson,    |                   |
|    | Visit     |            | ARNJESSICA  [address]   |                   |
|        |           |            | St  [place], [place]  |                   |
|        |           |            | [address]  [phone]  |                   |
|        |           |            |  [phone] (Fax)  |                   |
+--------+-----------+------------+----------------------+-------------------+
| / | Hospital  | Radiology  |   Popeye Townsend, |                   |
|    | Encounter |            |  MD  [address] |                   |
|        |           |            |  St.  [place],   |                   |
|        |           |            | [place] [address]             |                   |
|        |           |            | [phone]         |                   |
|        |           |            | [phone] (Fax)   |                   |
+--------+-----------+------------+----------------------+-------------------+
| / | Surgery   | Radiology  |   Popeye Townsend, | CV EP PPM SYSTEM  |
 
|    |           |            |  MD  [address] | IMPLANT           |
|        |           |            |  St.  [place],   |                   |
|        |           |            | [place] [address]             |                   |
|        |           |            | [phone]         |                   |
|        |           |            | [phone] (Fax)   |                   |
+--------+-----------+------------+----------------------+-------------------+
| 02/10/ | Clinical  | Cardiology |                      |                   |
|    | Support   |            |                      |                   |
+--------+-----------+------------+----------------------+-------------------+
| / | Office    | Cardiology |   Tonia Wilson,    |                   |
|    | Visit     |            | ARNP  [address]   |                   |
|        |           |            | [place]  |                   |
|        |           |            | [address]  [phone]  |                   |
|        |           |            |  [phone] (Fax)  |                   |
+--------+-----------+------------+----------------------+-------------------+
| / | Off-Site  | Nephrology |   Marzena Moser  |                   |
|    | Visit     |            | M, DO  [address]      |                   |
|        |           |            | [address]      |                   |
|        |           |            | [place]      |                   |
|        |           |            | [address]  [phone]  |                   |
|        |           |            |  [phone] (Fax)  |                   |
+--------+-----------+------------+----------------------+-------------------+
 documented as of this encounter
 
 Visit Diagnoses
 Not on filedocumented in this encounter

## 2020-01-08 NOTE — XMS
Encounter Summary
  Created on: 2020
 
 Viviana Ricci
 External Reference #: [id number]
 : 47
 Sex: Female
 
 Demographics
 
 
+-----------------------+----------------------+
| Address               | [address]      |
|                       | [place]  [address] |
+-----------------------+----------------------+
| Home Phone            | [phone]      |
+-----------------------+----------------------+
| Preferred Language    | Unknown              |
+-----------------------+----------------------+
| Marital Status        | Single               |
+-----------------------+----------------------+
| Restorationist Affiliation | [id number]                 |
+-----------------------+----------------------+
| Race                  | Unknown              |
+-----------------------+----------------------+
| Ethnic Group          | Unknown              |
+-----------------------+----------------------+
 
 
 Author
 
 
+--------------+--------------------------------------------+
| Author       | [facility]  |
|              | and [place]                                |
+--------------+--------------------------------------------+
| Organization | [facility]  |
|              | and [place]                                |
+--------------+--------------------------------------------+
| Address      | Unknown                                    |
+--------------+--------------------------------------------+
| Phone        | Unavailable                                |
+--------------+--------------------------------------------+
 
 
 
 Support
 
 
+----------------+--------------+---------------------+-----------------+
| Name           | Relationship | Address             | Phone           |
+----------------+--------------+---------------------+-----------------+
| Ada/Ed Radhames | ECON         | [address]              | [phone] |
|                |              | [place]  |                 |
|                |              |  [address]              |                 |
+----------------+--------------+---------------------+-----------------+
 
 
 
 
 Care Team Providers
 
 
+------------------------+------+-----------------+
| Care Team Member Name  | Role | Phone           |
+------------------------+------+-----------------+
| Marzena Moser DO | PCP  | [phone] |
+------------------------+------+-----------------+
 
 
 
 Encounter Details
 
 
+--------+----------+---------------------+----------------------+-------------+
| Date   | Type     | Department          | Care Team            | Description |
+--------+----------+---------------------+----------------------+-------------+
| / | Abstract |   [facility]         |   Marzena Moser  |             |
| 2018   |          | NEPHROLOGY  [address]   | DO ETIENNE  [address]      |             |
|        |          | [address]   | [address]      |             |
|        |          | [place], [place]     | [place], [place]      |             |
|        |          | [address]          | [address]  [phone]  |             |
|        |          | [phone]        |  [phone] (Fax)  |             |
+--------+----------+---------------------+----------------------+-------------+
 
 
 
 Social History
 
 
+--------------+-------+-----------+--------+------+
| Tobacco Use  | Types | Packs/Day | Years  | Date |
|              |       |           | Used   |      |
+--------------+-------+-----------+--------+------+
| Never Smoker |       |           |        |      |
+--------------+-------+-----------+--------+------+
 
 
 
+---------------------+---+---+---+
| Smokeless Tobacco:  |   |   |   |
| Never Used          |   |   |   |
+---------------------+---+---+---+
 
 
 
+---------------------------------------------------------------+
| Comments: some second hand smoke exposure, but fairly minimal |
+---------------------------------------------------------------+
 
 
 
+-------------+-------------+---------+----------+
| Alcohol Use | Drinks/Week | oz/Week | Comments |
+-------------+-------------+---------+----------+
| No          |             |         |          |
+-------------+-------------+---------+----------+
 
 
 
 
+------------------+---------------+
| Sex Assigned at  | Date Recorded |
| Birth            |               |
+------------------+---------------+
| Not on file      |               |
+------------------+---------------+
 
 
 
+----------------+-------------+-------------+
| Job Start Date | Occupation  | Industry    |
+----------------+-------------+-------------+
| Not on file    | Not on file | Not on file |
+----------------+-------------+-------------+
 
 
 
+----------------+--------------+------------+
| Travel History | Travel Start | Travel End |
+----------------+--------------+------------+
 
 
 
+-------------------------------------+
| No recent travel history available. |
+-------------------------------------+
 documented as of this encounter
 
 Plan of Treatment
 
 
+--------+-----------+------------+----------------------+-------------------+
| Date   | Type      | Specialty  | Care Team            | Description       |
+--------+-----------+------------+----------------------+-------------------+
| / | Office    | Cardiology |   Tonia Wilson,    |                   |
|    | Visit     |            | ARNP  [address]   |                   |
|        |           |            | St  [place] [facility] [place]  |                   |
|        |           |            | [address]  [phone]  |                   |
|        |           |            |  [phone] (Fax)  |                   |
+--------+-----------+------------+----------------------+-------------------+
| / | Hospital  | Radiology  |   Popeye Townsend, |                   |
|    | Encounter |            |  MD  [address] |                   |
|        |           |            |  St.  [place],   |                   |
|        |           |            | [place] [address]             |                   |
|        |           |            | [phone]         |                   |
|        |           |            | [phone] (Fax)   |                   |
+--------+-----------+------------+----------------------+-------------------+
| / | Surgery   | Radiology  |   Popeye Townsend, | CV EP PPM SYSTEM  |
|    |           |            |  MD  [address] | IMPLANT           |
|        |           |            |  St.  [place],   |                   |
|        |           |            | [place] [address]             |                   |
|        |           |            | [phone]         |                   |
|        |           |            | [phone] (Fax)   |                   |
+--------+-----------+------------+----------------------+-------------------+
| 02/10/ | Clinical  | Cardiology |                      |                   |
2020   | Support   |            |                      |                   |
+--------+-----------+------------+----------------------+-------------------+
| / | Office    | Cardiology |   Tonia Wilson,    |                   |
|    | Visit     |            | [facility]  [address]   |                   |
 
|        |           |            |   [place]  |                   |
|        |           |            | [address]  [phone]  |                   |
|        |           |            |  [phone] (Fax)  |                   |
+--------+-----------+------------+----------------------+-------------------+
| / | Off-Site  | Nephrology |   Marzena Moser  |                   |
2020   | Visit     |            | DO ETIENNE  [address]      |                   |
|        |           |            | [address]      |                   |
|        |           |            | [place]      |                   |
|        |           |            | [address]  [phone]  |                   |
|        |           |            |  [phone] (Fax)  |                   |
+--------+-----------+------------+----------------------+-------------------+
 documented as of this encounter
 
 Procedures
 
 
+--------------------+--------+------------+----------------------+----------------------+
| Procedure Name     | Priori | Date/Time  | Associated Diagnosis | Comments             |
|                    | ty     |            |                      |                      |
+--------------------+--------+------------+----------------------+----------------------+
| TACROLIMUS TROUGH  | Routin | 2018 |                      |   Results for this   |
| LC-MS/MS           | e      |            |                      | procedure are in the |
|                    |        |            |                      |  results section.    |
+--------------------+--------+------------+----------------------+----------------------+
 documented in this encounter
 
 Results
 Tacrolimus Trough, LC-MS/MS (2018)
 
+-------------+-------+-----------+------------+--------------+
| Component   | Value | Ref Range | Performed  | Pathologist  |
|             |       |           | At         | Signature    |
+-------------+-------+-----------+------------+--------------+
| Tacrolimus, | 3.3   |           |            |              |
|  LC-MS/MS,  |       |           |            |              |
| External    |       |           |            |              |
+-------------+-------+-----------+------------+--------------+
 
 
 
+----------+
| Specimen |
+----------+
| Blood    |
+----------+
 documented in this encounter
 
 Visit Diagnoses
 Not on filedocumented in this encounter

## 2020-01-08 NOTE — XMS
Encounter Summary
  Created on: 2020
 
 Viviana Ricci
 External Reference #: [id number]
 : 47
 Sex: Female
 
 Demographics
 
 
+-----------------------+----------------------+
| Address               | [address]      |
|                       | [place]  [address] |
+-----------------------+----------------------+
| Home Phone            | [phone]      |
+-----------------------+----------------------+
| Preferred Language    | Unknown              |
+-----------------------+----------------------+
| Marital Status        | Single               |
+-----------------------+----------------------+
| Hoahaoism Affiliation | [id number]                 |
+-----------------------+----------------------+
| Race                  | Unknown              |
+-----------------------+----------------------+
| Ethnic Group          | Unknown              |
+-----------------------+----------------------+
 
 
 Author
 
 
+--------------+--------------------------------------------+
| Author       | [facility]  |
|              | and [facility]                                |
+--------------+--------------------------------------------+
| Organization | [facility]  |
|              | and [facility]                                |
+--------------+--------------------------------------------+
| Address      | Unknown                                    |
+--------------+--------------------------------------------+
| Phone        | Unavailable                                |
+--------------+--------------------------------------------+
 
 
 
 Support
 
 
+----------------+--------------+---------------------+-----------------+
| Name           | Relationship | Address             | Phone           |
+----------------+--------------+---------------------+-----------------+
| Ada/Ed Radhames | ECON         | [address]              | [phone] |
|                |              | [place]  |                 |
|                |              |  [address]              |                 |
+----------------+--------------+---------------------+-----------------+
 
 
 
 
 Care Team Providers
 
 
+-----------------------+------+-------------+
| Care Team Member Name | Role | Phone       |
+-----------------------+------+-------------+
 PCP  | Unavailable |
+-----------------------+------+-------------+
 
 
 
 Encounter Details
 
 
+--------+----------+---------------------+----------------------+-------------+
| Date   | Type     | Department          | Care Team            | Description |
+--------+----------+---------------------+----------------------+-------------+
| / | Abstract |   [facility]  [place]         |   Marzena Moser  |             |
|    |          | NEPHROLOGY  [address]   | M, DO  [address]      |             |
|        |          | [address]   | [address]      |             |
|        |          | [place], [place]     | [place]      |             |
|        |          | [address]          | [address]  [phone]  |             |
|        |          | [phone]        |  [phone] (Fax)  |             |
+--------+----------+---------------------+----------------------+-------------+
 
 
 
 Social History
 
 
+--------------+-------+-----------+--------+------+
| Tobacco Use  | Types | Packs/Day | Years  | Date |
|              |       |           | Used   |      |
+--------------+-------+-----------+--------+------+
| Never Smoker |       |           |        |      |
+--------------+-------+-----------+--------+------+
 
 
 
+---------------------+---+---+---+
| Smokeless Tobacco:  |   |   |   |
| Never Used          |   |   |   |
+---------------------+---+---+---+
 
 
 
+---------------------------------------------------------------+
| Comments: some second hand smoke exposure, but fairly minimal |
+---------------------------------------------------------------+
 
 
 
+-------------+-------------+---------+----------+
| Alcohol Use | Drinks/Week | oz/Week | Comments |
+-------------+-------------+---------+----------+
| No          |             |         |          |
+-------------+-------------+---------+----------+
 
 
 
 
+------------------+---------------+
| Sex Assigned at  | Date Recorded |
| Birth            |               |
+------------------+---------------+
| Not on file      |               |
+------------------+---------------+
 
 
 
+----------------+-------------+-------------+
| Job Start Date | Occupation  | Industry    |
+----------------+-------------+-------------+
| Not on file    | Not on file | Not on file |
+----------------+-------------+-------------+
 
 
 
+----------------+--------------+------------+
| Travel History | Travel Start | Travel End |
+----------------+--------------+------------+
 
 
 
+-------------------------------------+
| No recent travel history available. |
+-------------------------------------+
 documented as of this encounter
 
 Plan of Treatment
 
 
+--------+-----------+------------+----------------------+-------------------+
| Date   | Type      | Specialty  | Care Team            | Description       |
+--------+-----------+------------+----------------------+-------------------+
| / | Office    | Cardiology |   Tonia Wilson,    |                   |
|    | Visit     |            | ARNJESSICA  [address]   |                   |
|        |           |            | [place]  |                   |
|        |           |            | [address]  [phone]  |                   |
|        |           |            |  [phone] (Fax)  |                   |
+--------+-----------+------------+----------------------+-------------------+
| / | Hospital  | Radiology  |   Popeye Townsend, |                   |
|    | Encounter |            |  MD  [address] |                   |
|        |           |            |  St.  [place],   |                   |
|        |           |            | [place] [address]             |                   |
|        |           |            | [phone]         |                   |
|        |           |            | [phone] (Fax)   |                   |
+--------+-----------+------------+----------------------+-------------------+
| / | Surgery   | Radiology  |   Popeye Townsend, | CV EP PPM SYSTEM  |
|    |           |            |  MD  [address] | IMPLANT           |
|        |           |            |  St.  [place],   |                   |
|        |           |            | [place] [address]             |                   |
|        |           |            | [phone]         |                   |
|        |           |            | [phone] (Fax)   |                   |
+--------+-----------+------------+----------------------+-------------------+
| 02/10/ | Clinical  | Cardiology |                      |                   |
|    | Support   |            |                      |                   |
+--------+-----------+------------+----------------------+-------------------+
| / | Office    | Cardiology |   Tonia Wilson,    |                   |
|    | Visit     |            | ARNP  [address]   |                   |
 
|        |           |            | St  [place], [place]  |                   |
|        |           |            | [address] [phone]  |                   |
|        |           |            |  [phone] (Fax)  |                   |
+--------+-----------+------------+----------------------+-------------------+
| / | Off-Site  | Nephrology |   Marzena Moser  |                   |
| 2020   | Visit     |            | DO ETIENNE  [address] [place] [address]      |                   |
|        |           |            | [address]      |                   |
|        |           |            | [place]      |                   |
|        |           |            | [id number] [phone]  |                   |
|        |           |            |  [phone] (Fax)  |                   |
+--------+-----------+------------+----------------------+-------------------+
 documented as of this encounter
 
 Visit Diagnoses
 Not on filedocumented in this encounter

## 2020-01-08 NOTE — XMS
Encounter Summary
  Created on: 2020
 
 Viviana Ricci
 External Reference #: [id number]
 : 47
 Sex: Female
 
 Demographics
 
 
+-----------------------+----------------------+
| Address               | [address]      |
|                       | [place]  [address] |
+-----------------------+----------------------+
| Home Phone            | [phone]      |
+-----------------------+----------------------+
| Preferred Language    | Unknown              |
+-----------------------+----------------------+
| Marital Status        | Single               |
+-----------------------+----------------------+
| Faith Affiliation | [id number]                 |
+-----------------------+----------------------+
| Race                  | Unknown              |
+-----------------------+----------------------+
| Ethnic Group          | Unknown              |
+-----------------------+----------------------+
 
 
 Author
 
 
+--------------+--------------------------------------------+
| Author       | [facility]  |
|              | and [place]                                |
+--------------+--------------------------------------------+
| Organization | [facility]  |
|              | and [place]                                |
+--------------+--------------------------------------------+
| Address      | Unknown                                    |
+--------------+--------------------------------------------+
| Phone        | Unavailable                                |
+--------------+--------------------------------------------+
 
 
 
 Support
 
 
+----------------+--------------+---------------------+-----------------+
| Name           | Relationship | Address             | Phone           |
+----------------+--------------+---------------------+-----------------+
| Ada/Ed Radhames | ECON         | [address]              | [phone] |
|                |              | [place]  |                 |
|                |              |  [address]              |                 |
+----------------+--------------+---------------------+-----------------+
 
 
 
 
 Care Team Providers
 
 
+-----------------------+------+-------------+
| Care Team Member Name | Role | Phone       |
+-----------------------+------+-------------+
 PCP  | Unavailable |
+-----------------------+------+-------------+
 
 
 
 Encounter Details
 
 
+--------+-------------+---------------------+----------------------+-------------+
| Date   | Type        | Department          | Care Team            | Description |
+--------+-------------+---------------------+----------------------+-------------+
| 07/15/ | Orders Only |   [facility]  [place]         |   Marzena Moser  |             |
|    |             | NEPHROLOGY  [address]   | M, DO  [address]      |             |
|        |             | [address]   | [address]      |             |
|        |             | [place]     | [place]      |             |
|        |             | [address]          | [address]  [phone]  |             |
|        |             | [phone]        |  [phone] (Fax)  |             |
+--------+-------------+---------------------+----------------------+-------------+
 
 
 
 Social History
 
 
+--------------+-------+-----------+--------+------+
| Tobacco Use  | Types | Packs/Day | Years  | Date |
|              |       |           | Used   |      |
+--------------+-------+-----------+--------+------+
| Never Smoker |       |           |        |      |
+--------------+-------+-----------+--------+------+
 
 
 
+---------------------+---+---+---+
| Smokeless Tobacco:  |   |   |   |
| Never Used          |   |   |   |
+---------------------+---+---+---+
 
 
 
+---------------------------------------------------------------+
| Comments: some second hand smoke exposure, but fairly minimal |
+---------------------------------------------------------------+
 
 
 
+-------------+-------------+---------+----------+
| Alcohol Use | Drinks/Week | oz/Week | Comments |
+-------------+-------------+---------+----------+
| No          |             |         |          |
+-------------+-------------+---------+----------+
 
 
 
 
+------------------+---------------+
| Sex Assigned at  | Date Recorded |
| Birth            |               |
+------------------+---------------+
| Not on file      |               |
+------------------+---------------+
 
 
 
+----------------+-------------+-------------+
| Job Start Date | Occupation  | Industry    |
+----------------+-------------+-------------+
| Not on file    | Not on file | Not on file |
+----------------+-------------+-------------+
 
 
 
+----------------+--------------+------------+
| Travel History | Travel Start | Travel End |
+----------------+--------------+------------+
 
 
 
+-------------------------------------+
| No recent travel history available. |
+-------------------------------------+
 documented as of this encounter
 
 Plan of Treatment
 
 
+--------+-----------+------------+----------------------+-------------------+
| Date   | Type      | Specialty  | Care Team            | Description       |
+--------+-----------+------------+----------------------+-------------------+
| / | Office    | Cardiology |   Tonia Wilson,    |                   |
|    | Visit     |            | [address]   |                   |
|        |           |            | [place]  |                   |
|        |           |            | [address] [phone]  |                   |
|        |           |            |  [phone] (Fax)  |                   |
+--------+-----------+------------+----------------------+-------------------+
| / | Hospital  | Radiology  |   Popeye Townsend, |                   |
|    | Encounter |            |  MD [address] |                   |
|        |           |            |  St.  [place],   |                   |
|        |           |            | [place] [address]             |                   |
|        |           |            | [phone]         |                   |
|        |           |            | [phone] (Fax)   |                   |
+--------+-----------+------------+----------------------+-------------------+
| / | Surgery   | Radiology  |   Popeye Townsend, | CV EP PPM SYSTEM  |
|    |           |            |  MD  [address] | IMPLANT           |
|        |           |            |  St.  [place],   |                   |
|        |           |            | [place] [address]             |                   |
|        |           |            | [phone]         |                   |
|        |           |            | [phone] (Fax)   |                   |
+--------+-----------+------------+----------------------+-------------------+
| 02/10/ | Clinical  | Cardiology |                      |                   |
|    | Support   |            |                      |                   |
+--------+-----------+------------+----------------------+-------------------+
| / | Office    | Cardiology |   Tonia Wilson,    |                   |
|    | Visit     |            | [address]   |                   |
 
|        |           |            | St  [place], [place]  |                   |
|        |           |            | [id number] [phone]  |                   |
|        |           |            |  [phone] (Fax)  |                   |
+--------+-----------+------------+----------------------+-------------------+
| / | Off-Site  | Nephrology |   Marzena Moser  |                   |
|    | Visit     |            | DO [address]      |                   |
|        |           |            | [address]      |                   |
|        |           |            | [place]      |                   |
|        |           |            | [address] [phone]  |                   |
|        |           |            |  [phone] (Fax)  |                   |
+--------+-----------+------------+----------------------+-------------------+
 documented as of this encounter
 
 Visit Diagnoses
 Not on filedocumented in this encounter

## 2020-01-08 NOTE — XMS
Encounter Summary
  Created on: 2020
 
 Viviana Ricci
 External Reference #: [id number]
 : 47
 Sex: Female
 
 Demographics
 
 
+-----------------------+----------------------+
| Address               | [address]      |
|                       | [place]  [address] |
+-----------------------+----------------------+
| Home Phone            | [phone]      |
+-----------------------+----------------------+
| Preferred Language    | Unknown              |
+-----------------------+----------------------+
| Marital Status        | Single               |
+-----------------------+----------------------+
| Sikhism Affiliation | [id number]                 |
+-----------------------+----------------------+
| Race                  | Unknown              |
+-----------------------+----------------------+
| Ethnic Group          | Unknown              |
+-----------------------+----------------------+
 
 
 Author
 
 
+--------------+--------------------------------------------+
| Author       | [facility]  |
|              | and [place]                                |
+--------------+--------------------------------------------+
| Organization | [facility]  |
|              | and [place]                                |
+--------------+--------------------------------------------+
| Address      | Unknown                                    |
+--------------+--------------------------------------------+
| Phone        | Unavailable                                |
+--------------+--------------------------------------------+
 
 
 
 Support
 
 
+----------------+--------------+---------------------+-----------------+
| Name           | Relationship | Address             | Phone           |
+----------------+--------------+---------------------+-----------------+
| Ada/Ed Radhames | ECON         | [address]              | [phone] |
|                |              | [place]  |                 |
|                |              |  [address]              |                 |
+----------------+--------------+---------------------+-----------------+
 
 
 
 
 Care Team Providers
 
 
+-----------------------+------+-------------+
| Care Team Member Name | Role | Phone       |
+-----------------------+------+-------------+
 PCP  | Unavailable |
+-----------------------+------+-------------+
 
 
 
 Encounter Details
 
 
+--------+----------+---------------------+----------------------+-------------+
| Date   | Type     | Department          | Care Team            | Description |
+--------+----------+---------------------+----------------------+-------------+
| 10/14/ | Abstract |   [facility] [place]         |   Marzena Moser  |             |
|    |          | NEPHROLOGY  [address]   | M, DO  [address]      |             |
|        |          | [address]   | [address]      |             |
|        |          | [place], [place]     | [place]      |             |
|        |          | [address]          | [address]  [phone]  |             |
|        |          | [phone]        |  [phone] (Fax)  |             |
+--------+----------+---------------------+----------------------+-------------+
 
 
 
 Social History
 
 
+--------------+-------+-----------+--------+------+
| Tobacco Use  | Types | Packs/Day | Years  | Date |
|              |       |           | Used   |      |
+--------------+-------+-----------+--------+------+
| Never Smoker |       |           |        |      |
+--------------+-------+-----------+--------+------+
 
 
 
+---------------------+---+---+---+
| Smokeless Tobacco:  |   |   |   |
| Never Used          |   |   |   |
+---------------------+---+---+---+
 
 
 
+---------------------------------------------------------------+
| Comments: some second hand smoke exposure, but fairly minimal |
+---------------------------------------------------------------+
 
 
 
+-------------+-------------+---------+----------+
| Alcohol Use | Drinks/Week | oz/Week | Comments |
+-------------+-------------+---------+----------+
| No          |             |         |          |
+-------------+-------------+---------+----------+
 
 
 
 
+------------------+---------------+
| Sex Assigned at  | Date Recorded |
| Birth            |               |
+------------------+---------------+
| Not on file      |               |
+------------------+---------------+
 
 
 
+----------------+-------------+-------------+
| Job Start Date | Occupation  | Industry    |
+----------------+-------------+-------------+
| Not on file    | Not on file | Not on file |
+----------------+-------------+-------------+
 
 
 
+----------------+--------------+------------+
| Travel History | Travel Start | Travel End |
+----------------+--------------+------------+
 
 
 
+-------------------------------------+
| No recent travel history available. |
+-------------------------------------+
 documented as of this encounter
 
 [facility] Marlin Samson - 10/14/2016  8:23 AM PDTOutside records: received driving privleges paper
work 10/3/16, from patient. Sent to scan.Electronically signed by Marlin Johnson at 10/14/2
016  8:28 AM PDTdocumented in this encounter
 
 Plan of Treatment
 
 
+--------+-----------+------------+----------------------+-------------------+
| Date   | Type      | Specialty  | Care Team            | Description       |
+--------+-----------+------------+----------------------+-------------------+
| / | Office    | Cardiology |   Tonia Wilson,    |                   |
|    | Visit     |            | ARNP  [address]   |                   |
|        |           |            |   [place]  |                   |
|        |           |            | [address]  [phone]  |                   |
|        |           |            |  [phone] (Fax)  |                   |
+--------+-----------+------------+----------------------+-------------------+
| / | Hospital  | Radiology  |   Popeye Townsend, |                   |
2020   | Encounter |            |  MD  [address] |                   |
|        |           |            |  [place],   |                   |
|        |           |            | [place] [address]             |                   |
|        |           |            | [phone]         |                   |
|        |           |            | [phone] (Fax)   |                   |
+--------+-----------+------------+----------------------+-------------------+
| / | Surgery   | Radiology  |   Popeye Townsend, | CV EP PPM SYSTEM  |
|    |           |            |  MD  [address] | IMPLANT           |
|        |           |            |  [place],   |                   |
|        |           |            | [place] [address]             |                   |
|        |           |            | [phone]         |                   |
|        |           |            | [phone] (Fax)   |                   |
+--------+-----------+------------+----------------------+-------------------+
 
| 02/10/ | Clinical  | Cardiology |                      |                   |
|    | Support   |            |                      |                   |
+--------+-----------+------------+----------------------+-------------------+
| / | Office    | Cardiology |   Tonia Wilson,    |                   |
|    | Visit     |            | [place]  [address]   |                   |
|        |           |            | [place]  |                   |
|        |           |            | [address]  [phone]  |                   |
|        |           |            |  [phone] (Fax)  |                   |
+--------+-----------+------------+----------------------+-------------------+
| / | Off-Site  | Nephrology |   Marzena Moser  |                   |
|    | Visit     |            | M, DO  [address]      |                   |
|        |           |            | [address]      |                   |
|        |           |            | [place]      |                   |
|        |           |            | [address]  [phone]  |                   |
|        |           |            |  [phone] (Fax)  |                   |
+--------+-----------+------------+----------------------+-------------------+
 documented as of this encounter
 
 Visit Diagnoses
 Not on filedocumented in this encounter

## 2020-01-08 NOTE — XMS
Encounter Summary
  Created on: 2020
 
 Viviana Ricci
 External Reference #: [id number]
 : 47
 Sex: Female
 
 Demographics
 
 
+-----------------------+----------------------+
| Address               | [address]      |
|                       | [place]  [address] |
+-----------------------+----------------------+
| Home Phone            | [phone]      |
+-----------------------+----------------------+
| Preferred Language    | Unknown              |
+-----------------------+----------------------+
| Marital Status        | Single               |
+-----------------------+----------------------+
| Worship Affiliation | [id number]                 |
+-----------------------+----------------------+
| Race                  | Unknown              |
+-----------------------+----------------------+
| Ethnic Group          | Unknown              |
+-----------------------+----------------------+
 
 
 Author
 
 
+--------------+--------------------------------------------+
| Author       | [facility]  |
|              | and [facility]                                |
+--------------+--------------------------------------------+
| Organization | [facility]  |
|              | and [place]                                |
+--------------+--------------------------------------------+
| Address      | Unknown                                    |
+--------------+--------------------------------------------+
| Phone        | Unavailable                                |
+--------------+--------------------------------------------+
 
 
 
 Support
 
 
+----------------+--------------+---------------------+-----------------+
| Name           | Relationship | Address             | Phone           |
+----------------+--------------+---------------------+-----------------+
| Ada/Ed Radhames | ECON         | [place]              | [phone] |
|                |              | [place]  |                 |
|                |              |  [address]              |                 |
+----------------+--------------+---------------------+-----------------+
 
 
 
 
 Care Team Providers
 
 
+-----------------------+------+-------------+
| Care Team Member Name | Role | Phone       |
+-----------------------+------+-------------+
 PCP  | Unavailable |
+-----------------------+------+-------------+
 
 
 
 Reason for Visit
 
 
+-------------------+----------+
| Reason            | Comments |
+-------------------+----------+
| Medication Refill |          |
+-------------------+----------+
 
 
 
 Encounter Details
 
 
+--------+--------+---------------------+----------------------+-------------------+
| Date   | Type   | Department          | Care Team            | Description       |
+--------+--------+---------------------+----------------------+-------------------+
| / | Refill |   [facility]         |   Marzena Moser  | Medication Refill |
|    |        | NEPHROLOGY  [address]   | M, DO  [address]      |                   |
|        |        | [address]   | [address]      |                   |
|        |        | [place], [place]     | [place], [place]      |                   |
|        |        | [address]          | [address]  [phone]  |                   |
|        |        | [phone]        |  [phone] (Fax)  |                   |
+--------+--------+---------------------+----------------------+-------------------+
 
 
 
 Social History
 
 
+--------------+-------+-----------+--------+------+
| Tobacco Use  | Types | Packs/Day | Years  | Date |
|              |       |           | Used   |      |
+--------------+-------+-----------+--------+------+
| Never Smoker |       |           |        |      |
+--------------+-------+-----------+--------+------+
 
 
 
+---------------------+---+---+---+
| Smokeless Tobacco:  |   |   |   |
| Never Used          |   |   |   |
+---------------------+---+---+---+
 
 
 
+-------------+-------------+---------+----------+
| Alcohol Use | Drinks/Week | oz/Week | Comments |
 
+-------------+-------------+---------+----------+
| No          |             |         |          |
+-------------+-------------+---------+----------+
 
 
 
+------------------+---------------+
| Sex Assigned at  | Date Recorded |
| Birth            |               |
+------------------+---------------+
| Not on file      |               |
+------------------+---------------+
 
 
 
+----------------+-------------+-------------+
| Job Start Date | Occupation  | Industry    |
+----------------+-------------+-------------+
| Not on file    | Not on file | Not on file |
+----------------+-------------+-------------+
 
 
 
+----------------+--------------+------------+
| Travel History | Travel Start | Travel End |
+----------------+--------------+------------+
 
 
 
+-------------------------------------+
| No recent travel history available. |
+-------------------------------------+
 documented as of this encounter
 
 Plan of Treatment
 
 
+--------+-----------+------------+----------------------+-------------------+
| Date   | Type      | Specialty  | Care Team            | Description       |
+--------+-----------+------------+----------------------+-------------------+
| / | Office    | Cardiology |   Tonia Wilson,    |                   |
|    | Visit     |            | BEL[place]  [address]   |                   |
|        |           |            | St  [place], [place]  |                   |
|        |           |            | [address] [phone]  |                   |
|        |           |            |  [phone] (Fax)  |                   |
+--------+-----------+------------+----------------------+-------------------+
| / | Hospital  | Radiology  |   Cary Himanshuraul, |                   |
|    | Encounter |            |  MD  [address] |                   |
|        |           |            |  St.  [place],   |                   |
|        |           |            | [place] [address]             |                   |
|        |           |            | [phone]         |                   |
|        |           |            | [phone] (Fax)   |                   |
+--------+-----------+------------+----------------------+-------------------+
| / | Surgery   | Radiology  |   Aimeechidi Yinpeeraul, | CV EP PPM SYSTEM  |
|    |           |            |  MD  [address] | IMPLANT           |
|        |           |            |  St.  [place],   |                   |
|        |           |            | [place] [address]             |                   |
|        |           |            | [phone]         |                   |
|        |           |            | [phone] (Fax)   |                   |
+--------+-----------+------------+----------------------+-------------------+
 
| 02/10/ | Clinical  | Cardiology |                      |                   |
|    | Support   |            |                      |                   |
+--------+-----------+------------+----------------------+-------------------+
| / | Office    | Cardiology |   Tonia Wilson,    |                   |
|    | Visit     |            | ARNP  [address]   |                   |
|        |           |            | St  [place], [place]  |                   |
|        |           |            | [address]  [phone]  |                   |
|        |           |            |  [phone] (Fax)  |                   |
+--------+-----------+------------+----------------------+-------------------+
| / | Off-Site  | Nephrology |   Marzena Moesr  |                   |
|    | Visit     |            | DO ETIENNE  [address] [place] [address]      |                   |
|        |           |            | [address]      |                   |
|        |           |            | [place]      |                   |
|        |           |            | [id number] [phone]  |                   |
|        |           |            |  [phone] (Fax)  |                   |
+--------+-----------+------------+----------------------+-------------------+
 documented as of this encounter
 
 Visit Diagnoses
 Not on filedocumented in this encounter

## 2020-01-08 NOTE — XMS
Encounter Summary
  Created on: 2020
 
 Viviana Ricci
 External Reference #: [id number]
 : 47
 Sex: Female
 
 Demographics
 
 
+-----------------------+----------------------+
| Address               | [address]      |
|                       | [place]  [address] |
+-----------------------+----------------------+
| Home Phone            | [phone]      |
+-----------------------+----------------------+
| Preferred Language    | Unknown              |
+-----------------------+----------------------+
| Marital Status        | Single               |
+-----------------------+----------------------+
| [employer] Affiliation | [id number]                 |
+-----------------------+----------------------+
| Race                  | Unknown              |
+-----------------------+----------------------+
| Ethnic Group          | Unknown              |
+-----------------------+----------------------+
 
 
 Author
 
 
+--------------+--------------------------------------------+
| Author       | [facility]  |
|              | and [place]                                |
+--------------+--------------------------------------------+
| Organization | [facility]  |
|              | and [place]                                |
+--------------+--------------------------------------------+
| Address      | Unknown                                    |
+--------------+--------------------------------------------+
| Phone        | Unavailable                                |
+--------------+--------------------------------------------+
 
 
 
 Support
 
 
+----------------+--------------+---------------------+-----------------+
| Name           | Relationship | Address             | Phone           |
+----------------+--------------+---------------------+-----------------+
| Ada/Ed Radhames | ECON         | [place]              | [phone] |
|                |              | [place]  |                 |
|                |              |  [address]              |                 |
+----------------+--------------+---------------------+-----------------+
 
 
 
 
 Care Team Providers
 
 
+-----------------------+------+-------------+
| Care Team Member Name | Role | Phone       |
+-----------------------+------+-------------+
 PCP  | Unavailable |
+-----------------------+------+-------------+
 
 
 
 Reason for Visit
 
 
+-------------+------------+
| Reason      | Comments   |
+-------------+------------+
| Appointment | NOV recall |
+-------------+------------+
 
 
 
 Encounter Details
 
 
+--------+-----------+----------------------+----------------------+-------------------+
| Date   | Type      | Department           | Care Team            | Description       |
+--------+-----------+----------------------+----------------------+-------------------+
| / | Telephone |   [facility] [place]          |   Tonia Wilson,    | Appointment (NOV  |
|    |           | CARDIOLOGY  [address]    | ARNP  [address]   | recall)           |
|        |           | [address]  [place], | [address]  [place] [facility], [place]  |                   |
|        |           |  [place] [address]       | [address]  [phone]  |                   |
|        |           | [phone]         |  [phone] (Fax)  |                   |
+--------+-----------+----------------------+----------------------+-------------------+
 
 
 
 Social History
 
 
+--------------+-------+-----------+--------+------+
| Tobacco Use  | Types | Packs/Day | Years  | Date |
|              |       |           | Used   |      |
+--------------+-------+-----------+--------+------+
| Never Smoker |       |           |        |      |
+--------------+-------+-----------+--------+------+
 
 
 
+---------------------+---+---+---+
| Smokeless Tobacco:  |   |   |   |
| Never Used          |   |   |   |
+---------------------+---+---+---+
 
 
 
+---------------------------------------------------------------+
| Comments: some second hand smoke exposure, but fairly minimal |
+---------------------------------------------------------------+
 
 
 
 
+-------------+-------------+---------+----------+
| Alcohol Use | Drinks/Week | oz/Week | Comments |
+-------------+-------------+---------+----------+
| No          |             |         |          |
+-------------+-------------+---------+----------+
 
 
 
+------------------+---------------+
| Sex Assigned at  | Date Recorded |
| Birth            |               |
+------------------+---------------+
| Not on file      |               |
+------------------+---------------+
 
 
 
+----------------+-------------+-------------+
| Job Start Date | Occupation  | Industry    |
+----------------+-------------+-------------+
| Not on file    | Not on file | Not on file |
+----------------+-------------+-------------+
 
 
 
+----------------+--------------+------------+
| Travel History | Travel Start | Travel End |
+----------------+--------------+------------+
 
 
 
+-------------------------------------+
| No recent travel history available. |
+-------------------------------------+
 documented as of this encounter
 
 Plan of Treatment
 
 
+--------+-----------+------------+----------------------+-------------------+
| Date   | Type      | Specialty  | Care Team            | Description       |
+--------+-----------+------------+----------------------+-------------------+
| / | Office    | Cardiology |   Tonia Wilson,    |                   |
|    | Visit     |            | ARNJESSICA  [address]   |                   |
|        |           |            | St  [place] [facility], [place]  |                   |
|        |           |            | [address]  [phone]  |                   |
|        |           |            |  [phone] (Fax)  |                   |
+--------+-----------+------------+----------------------+-------------------+
| / | Hospital  | Radiology  |   Popeye Townsend, |                   |
|    | Encounter |            |  MD  [address] |                   |
|        |           |            |  [place],   |                   |
|        |           |            | [place] [address]             |                   |
|        |           |            | [phone]         |                   |
|        |           |            | [phone] (Fax)   |                   |
+--------+-----------+------------+----------------------+-------------------+
| / | Surgery   | Radiology  |   Popeye Townsend, | CV EP PPM SYSTEM  |
|    |           |            |  MD  [address] | IMPLANT           |
 
|        |           |            |  [place],   |                   |
|        |           |            | [place] [address]             |                   |
|        |           |            | [phone]         |                   |
|        |           |            | [phone] (Fax)   |                   |
+--------+-----------+------------+----------------------+-------------------+
| 02/10/ | Clinical  | Cardiology |                      |                   |
|    | Support   |            |                      |                   |
+--------+-----------+------------+----------------------+-------------------+
| / | Office    | Cardiology |   Tonia Wilson,    |                   |
|    | Visit     |            | BELKIS  [address]   |                   |
|        |           |            | [place]  |                   |
|        |           |            | [address]  [phone]  |                   |
|        |           |            |  [phone] (Fax)  |                   |
+--------+-----------+------------+----------------------+-------------------+
| / | Off-Site  | Nephrology |   Marzena Moser  |                   |
|    | Visit     |            | DO ETIENNE  [address] [place] [address]      |                   |
|        |           |            | [address]      |                   |
|        |           |            | [place]      |                   |
|        |           |            | [address] [phone]  |                   |
|        |           |            |  [phone] (Fax)  |                   |
+--------+-----------+------------+----------------------+-------------------+
 documented as of this encounter
 
 Visit Diagnoses
 Not on filedocumented in this encounter

## 2020-01-08 NOTE — XMS
Encounter Summary
  Created on: 2020
 
 Viviana Ricci
 External Reference #: [id number]
 : 47
 Sex: Female
 
 Demographics
 
 
+-----------------------+----------------------+
| Address               | [address]      |
|                       | [place]  [address] |
+-----------------------+----------------------+
| Home Phone            | [phone]      |
+-----------------------+----------------------+
| Preferred Language    | Unknown              |
+-----------------------+----------------------+
| Marital Status        | Single               |
+-----------------------+----------------------+
| Mosque Affiliation | [id number]                 |
+-----------------------+----------------------+
| Race                  | Unknown              |
+-----------------------+----------------------+
| Ethnic Group          | Unknown              |
+-----------------------+----------------------+
 
 
 Author
 
 
+--------------+--------------------------------------------+
| Author       | [facility] [place]  |
|              | and [facility]                                |
+--------------+--------------------------------------------+
| Organization | [facility] [place]  |
|              | and [place]                                |
+--------------+--------------------------------------------+
| Address      | Unknown                                    |
+--------------+--------------------------------------------+
| Phone        | Unavailable                                |
+--------------+--------------------------------------------+
 
 
 
 Support
 
 
+----------------+--------------+---------------------+-----------------+
| Name           | Relationship | Address             | Phone           |
+----------------+--------------+---------------------+-----------------+
| Ada/Ed Radhames | ECON         | [address]              | [phone] |
|                |              | [place]  |                 |
|                |              |  [address]              |                 |
+----------------+--------------+---------------------+-----------------+
 
 
 
 
 Care Team Providers
 
 
+-----------------------+------+-------------+
| Care Team Member Name | Role | Phone       |
+-----------------------+------+-------------+
 PCP  | Unavailable |
+-----------------------+------+-------------+
 
 
 
 Reason for Visit
 
 
+--------+-----------+
| Reason | Comments  |
+--------+-----------+
| Other  | IC Intake |
+--------+-----------+
 
 
 
 Encounter Details
 
 
+--------+-----------+----------------------+----------------------+-------------------+
| Date   | Type      | Department           | Care Team            | Description       |
+--------+-----------+----------------------+----------------------+-------------------+
| 04/15/ | Telephone |   [facility]          |   Popeye Townsend, | Other (IC Intake) |
|    |           | CARDIOLOGY  [address]    |  MD  [address] |                   |
|        |           | [address] [place]  [place], |  St.  [place],   |                   |
|        |           |  [place] [address]       | [place] [address]             |                   |
|        |           | [phone]         | [phone]         |                   |
|        |           |                      | [phone] (Fax)   |                   |
+--------+-----------+----------------------+----------------------+-------------------+
 
 
 
 Social History
 
 
+--------------+-------+-----------+--------+------+
| Tobacco Use  | Types | Packs/Day | Years  | Date |
|              |       |           | Used   |      |
+--------------+-------+-----------+--------+------+
| Never Smoker |       |           |        |      |
+--------------+-------+-----------+--------+------+
 
 
 
+---------------------+---+---+---+
| Smokeless Tobacco:  |   |   |   |
| Never Used          |   |   |   |
+---------------------+---+---+---+
 
 
 
+---------------------------------------------------------------+
| Comments: some second hand smoke exposure, but fairly minimal |
 
+---------------------------------------------------------------+
 
 
 
+-------------+-------------+---------+----------+
| Alcohol Use | Drinks/Week | oz/Week | Comments |
+-------------+-------------+---------+----------+
| No          |             |         |          |
+-------------+-------------+---------+----------+
 
 
 
+------------------+---------------+
| Sex Assigned at  | Date Recorded |
| Birth            |               |
+------------------+---------------+
| Not on file      |               |
+------------------+---------------+
 
 
 
+----------------+-------------+-------------+
| Job Start Date | Occupation  | Industry    |
+----------------+-------------+-------------+
| Not on file    | Not on file | Not on file |
+----------------+-------------+-------------+
 
 
 
+----------------+--------------+------------+
| Travel History | Travel Start | Travel End |
+----------------+--------------+------------+
 
 
 
+-------------------------------------+
| No recent travel history available. |
+-------------------------------------+
 documented as of this encounter
 
 Plan of Treatment
 
 
+--------+-----------+------------+----------------------+-------------------+
| Date   | Type      | Specialty  | Care Team            | Description       |
+--------+-----------+------------+----------------------+-------------------+
| / | Office    | Cardiology |   Tonia Wilson,    |                   |
|    | Visit     |            | ARNP  [address]   |                   |
|        |           |            | St  [place], [place]  |                   |
|        |           |            | [address]  [phone]  |                   |
|        |           |            |  [phone] (Fax)  |                   |
+--------+-----------+------------+----------------------+-------------------+
| / | Hospital  | Radiology  |   Popeye Townsend, |                   |
|    | Encounter |            |  MD  [address] |                   |
|        |           |            |  [place],   |                   |
|        |           |            | [place] [address]             |                   |
|        |           |            | [phone]         |                   |
|        |           |            | [phone] (Fax)   |                   |
+--------+-----------+------------+----------------------+-------------------+
| / | Surgery   | Radiology  |   Popeye Townsend, | CV EP PPM SYSTEM  |
 
|    |           |            |  MD  [address] | IMPLANT           |
|        |           |            |  St.  [place],   |                   |
|        |           |            | [place] [address]             |                   |
|        |           |            | [phone]         |                   |
|        |           |            | [phone] (Fax)   |                   |
+--------+-----------+------------+----------------------+-------------------+
| 02/10/ | Clinical  | Cardiology |                      |                   |
|    | Support   |            |                      |                   |
+--------+-----------+------------+----------------------+-------------------+
| / | Office    | Cardiology |   Tonia Wilson,    |                   |
|    | Visit     |            | POP  [address]   |                   |
|        |           |            | [place]  |                   |
|        |           |            | [address]  [phone]  |                   |
|        |           |            |  [phone] (Fax)  |                   |
+--------+-----------+------------+----------------------+-------------------+
| / | Off-Site  | Nephrology |   Marzena Moser  |                   |
|    | Visit     |            | DO ETIENNE  [address] [place] [address]      |                   |
|        |           |            | [address]      |                   |
|        |           |            | [place]      |                   |
|        |           |            | [address] [phone]  |                   |
|        |           |            |  [phone] (Fax)  |                   |
+--------+-----------+------------+----------------------+-------------------+
 documented as of this encounter
 
 Visit Diagnoses
 Not on filedocumented in this encounter
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
## 2020-01-08 NOTE — XMS
Encounter Summary
  Created on: 2020
 
 Viviana Ricci
 External Reference #: [id number]
 : 47
 Sex: Female
 
 Demographics
 
 
+-----------------------+----------------------+
| Address               | [address]      |
|                       | [place]  [address] |
+-----------------------+----------------------+
| Home Phone            | [phone]      |
+-----------------------+----------------------+
| Preferred Language    | Unknown              |
+-----------------------+----------------------+
| Marital Status        | Single               |
+-----------------------+----------------------+
| Holiness Affiliation | [id number]                 |
+-----------------------+----------------------+
| Race                  | Unknown              |
+-----------------------+----------------------+
| Ethnic Group          | Unknown              |
+-----------------------+----------------------+
 
 
 Author
 
 
+--------------+--------------------------------------------+
| Author       | [facility]  |
|              | and [place]                                |
+--------------+--------------------------------------------+
| Organization | [facility]  |
|              | and [place]                                |
+--------------+--------------------------------------------+
| Address      | Unknown                                    |
+--------------+--------------------------------------------+
| Phone        | Unavailable                                |
+--------------+--------------------------------------------+
 
 
 
 Support
 
 
+----------------+--------------+---------------------+-----------------+
| Name           | Relationship | Address             | Phone           |
+----------------+--------------+---------------------+-----------------+
| Ada/Ed Radhames | ECON         | [place]              | [phone] |
|                |              | [place], [place]  |                 |
|                |              |  [id number]              |                 |
+----------------+--------------+---------------------+-----------------+
 
 
 
 
 Care Team Providers
 
 
+-----------------------+------+-------------+
| Care Team Member Name | Role | Phone       |
+-----------------------+------+-------------+
 PCP  | Unavailable |
+-----------------------+------+-------------+
 
 
 
 Encounter Details
 
 
+--------+-----------+----------------------+----------------+-------------+
| Date   | Type      | Department           | Care Team      | Description |
+--------+-----------+----------------------+----------------+-------------+
| 04/15/ | [facility]  |   [facility] |   [place],  | Cough;      |
|    | Encounter |  [facility]   | Nena GUSMAN MD  | Dyspnea     |
|        |           | FUNCTION  [address]      |                |             |
|        |           | [place], |                |             |
|        |           |  [place] [address]       |                |             |
|        |           | [phone]         |                |             |
+--------+-----------+----------------------+----------------+-------------+
 
 
 
 Social History
 
 
+--------------+-------+-----------+--------+------+
| Tobacco Use  | Types | Packs/Day | Years  | Date |
|              |       |           | Used   |      |
+--------------+-------+-----------+--------+------+
| Never Smoker |       |           |        |      |
+--------------+-------+-----------+--------+------+
 
 
 
+---------------------+---+---+---+
| Smokeless Tobacco:  |   |   |   |
| Never Used          |   |   |   |
+---------------------+---+---+---+
 
 
 
+---------------------------------------------------------------+
| Comments: some second hand smoke exposure, but fairly minimal |
+---------------------------------------------------------------+
 
 
 
+-------------+-------------+---------+----------+
| Alcohol Use | Drinks/Week | oz/Week | Comments |
+-------------+-------------+---------+----------+
| No          |             |         |          |
+-------------+-------------+---------+----------+
 
 
 
 
+------------------+---------------+
| Sex Assigned at  | Date Recorded |
| Birth            |               |
+------------------+---------------+
| Not on file      |               |
+------------------+---------------+
 
 
 
+----------------+-------------+-------------+
| Job Start Date | Occupation  | Industry    |
+----------------+-------------+-------------+
| Not on file    | Not on file | Not on file |
+----------------+-------------+-------------+
 
 
 
+----------------+--------------+------------+
| Travel History | Travel Start | Travel End |
+----------------+--------------+------------+
 
 
 
+-------------------------------------+
| No recent travel history available. |
+-------------------------------------+
 documented as of this encounter
 
 Medications at Time of Discharge
 
 
+----------------------+----------------------+-----------+---------+----------+-----------+
| Medication           | Sig                  | Dispensed | Refills | Start    | End Date  |
|                      |                      |           |         | Date     |           |
+----------------------+----------------------+-----------+---------+----------+-----------+
|   cholecalciferol    | Take 2,000 Units by  |           | 0       |          |           |
| (VITAMIN D-3) 2000   | mouth Every other    |           |         |          |           |
| UNITS                | day.                 |           |         |          |           |
| TABSIndications:     |                      |           |         |          |           |
| Unspecified          |                      |           |         |          |           |
| hypertensive kidney  |                      |           |         |          |           |
| disease with chronic |                      |           |         |          |           |
|  kidney disease      |                      |           |         |          |           |
| stage I through      |                      |           |         |          |           |
| stage IV, or         |                      |           |         |          |           |
| unspecified(403.90), |                      |           |         |          |           |
|  FSGS (focal         |                      |           |         |          |           |
| segmental            |                      |           |         |          |           |
| glomerulosclerosis), |                      |           |         |          |           |
|  Hyperlipidemia,     |                      |           |         |          |           |
| Complications of     |                      |           |         |          |           |
| transplanted kidney  |                      |           |         |          |           |
+----------------------+----------------------+-----------+---------+----------+-----------+
|   glucose blood VI   | Check blood sugar    |   100     | 12      | 20 |           |
| test strips (ONE     | before each meal and | each      |         | 12       |           |
| TOUCH ULTRA TEST)    |  as directed         |           |         |          |           |
| stripIndications:    |                      |           |         |          |           |
| Unspecified          |                      |           |         |          |           |
| hypertensive kidney  |                      |           |         |          |           |
 
| disease with chronic |                      |           |         |          |           |
|  kidney disease      |                      |           |         |          |           |
| stage I through      |                      |           |         |          |           |
| stage IV, or         |                      |           |         |          |           |
| unspecified(403.90), |                      |           |         |          |           |
|  Complications of    |                      |           |         |          |           |
| transplanted kidney, |                      |           |         |          |           |
|  Diabetes mellitus   |                      |           |         |          |           |
| type II,             |                      |           |         |          |           |
| uncontrolled (HCC),  |                      |           |         |          |           |
| Hyperlipidemia       |                      |           |         |          |           |
+----------------------+----------------------+-----------+---------+----------+-----------+
|   Respiratory        | Decrease CPAP to     |   1 each  | 0       | 20 |           |
| Therapy Supplies     | 12-18 cmH2O          |           |         | 13       |           |
| MISC                 | Diagnosis            |           |         |          |           |
|                      | Code(s)327.23.       |           |         |          |           |
|                      | Please send order to |           |         |          |           |
|                      |  [facility]     |           |         |          |           |
+----------------------+----------------------+-----------+---------+----------+-----------+
|   allopurinol        | Take 1 tablet by     |   30      | 11      | 02/10/20 |  |
| (ZYLOPRIM) 100 mg    | mouth Daily.         | tablet    |         | 14       | 5         |
| tablet               |                      |           |         |          |           |
+----------------------+----------------------+-----------+---------+----------+-----------+
|   aspirin 81 MG EC   | Take 81 mg by mouth  |           | 0       | 20 |  |
| tablet               | Daily.               |           |         | 12       | 5         |
+----------------------+----------------------+-----------+---------+----------+-----------+
|   cinacalcet         | Take 1 tablet by     |   30      | 12      | 20 |  |
| (SENSIPAR) 30 mg     | mouth Daily.         | tablet    |         | 13       | 4         |
| tablet               |                      |           |         |          |           |
+----------------------+----------------------+-----------+---------+----------+-----------+
|   fludrocortisone    | Take 1 tablet by     |   45      | 2       | 20 |  |
| (FLORINEF) 0.1 mg    | mouth Every other    | tablet    |         | 14       | 5         |
| tablet               | day.                 |           |         |          |           |
+----------------------+----------------------+-----------+---------+----------+-----------+
|   fluticasone        | Inhale 1 puff into   |   1       | 11      | 20 |  |
| (FLOVENT HFA) 220    | the lungs 2 times    | Inhaler   |         | 13       | 5         |
| mcg/puff inhaler     | daily. Rinse mouth   |           |         |          |           |
|                      | after use.           |           |         |          |           |
+----------------------+----------------------+-----------+---------+----------+-----------+
|   furosemide (LASIX) | Take 1 tablet by     |   30      | 5       | 20 |  |
|  40 mg tablet        | mouth Daily.         | tablet    |         | 13       | 4         |
+----------------------+----------------------+-----------+---------+----------+-----------+
|   insulin glargine   | Inject 20 Units      |   10 mL   | 0       | 20 |  |
| (LANTUS) 100         | under the skin every |           |         | 13       | 4         |
| units/mL             |  morning.            |           |         |          |           |
| injectionIndications |                      |           |         |          |           |
| : Unspecified        |                      |           |         |          |           |
| hypertensive kidney  |                      |           |         |          |           |
| disease with chronic |                      |           |         |          |           |
|  kidney disease      |                      |           |         |          |           |
| stage I through      |                      |           |         |          |           |
| stage IV, or         |                      |           |         |          |           |
| unspecified(403.90), |                      |           |         |          |           |
|  Complications of    |                      |           |         |          |           |
| transplanted kidney, |                      |           |         |          |           |
|  Diabetes mellitus   |                      |           |         |          |           |
| type II,             |                      |           |         |          |           |
| uncontrolled (HCC),  |                      |           |         |          |           |
| Hyperlipidemia       |                      |           |         |          |           |
+----------------------+----------------------+-----------+---------+----------+-----------+
 
|   insulin lispro     | Inject               |   10 pen  | 5       | 20 |  |
| (HUMALOG) 100        | subcutaneously       |           |         | 13       | 4         |
| units/mL injection   | before meals         |           |         |          |           |
|                      | according to sliding |           |         |          |           |
|                      |  scale               |           |         |          |           |
+----------------------+----------------------+-----------+---------+----------+-----------+
|   Insulin            | Use before meals and |   100     | 11      | 20 |  |
| Syringe-Needle U-100 |  as directed.        | each      |         | 13       | 4         |
|  (BD INSULIN SYRINGE |                      |           |         |          |           |
|  ULTRAFINE) 31G X    |                      |           |         |          |           |
| 16" 0.5 ML MISC    |                      |           |         |          |           |
+----------------------+----------------------+-----------+---------+----------+-----------+
|   levothyroxine      | Take 1 tablet by     |   30      | 11      | 20 | 10/06/201 |
| (LEVOTHROID) 50 mcg  | mouth Daily.         | tablet    |         | 13       | 4         |
| tablet               |                      |           |         |          |           |
+----------------------+----------------------+-----------+---------+----------+-----------+
|   lisinopril         | Take 1 tablet by     |   90      | 3       | 20 |  |
| (PRINIVIL,ZESTRIL)   | mouth Daily.         | tablet    |         | 13       | 4         |
| 30 MG tablet         |                      |           |         |          |           |
+----------------------+----------------------+-----------+---------+----------+-----------+
|   loperamide         | Take 2 mg by mouth   |           | 0       | 20 |  |
| (ANTI-DIARRHEAL) 2   | Daily as needed.     |           |         | 12       | 4         |
| mg capsule           |                      |           |         |          |           |
+----------------------+----------------------+-----------+---------+----------+-----------+
|   magnesium oxide    | Take 1 tablet by     |   62      | 12      | 20 |  |
| (MAG-OX) 400 mg      | mouth 2 times daily. | tablet    |         | 13       | 4         |
| tablet               |                      |           |         |          |           |
+----------------------+----------------------+-----------+---------+----------+-----------+
|   metoprolol         | Take 1 tablet by     |   60      | 11      | 20 |  |
| tartrate (LOPRESSOR) | mouth 2 times daily. | tablet    |         | 13       | 4         |
|  25 mg tablet        |                      |           |         |          |           |
+----------------------+----------------------+-----------+---------+----------+-----------+
|   mycophenolate      | Take 250 mg by mouth |           | 0       | 20 | 10/14/201 |
| (CELLCEPT) 250 mg    |  3 times daily.      |           |         | 11       | 5         |
| capsule              |                      |           |         |          |           |
+----------------------+----------------------+-----------+---------+----------+-----------+
|   omeprazole         | Take one capsule by  |   90      | 3       | 20 |  |
| (PRILOSEC) 20 mg     | mouth once daily on  | capsule   |         | 13       | 4         |
| capsule              | an empty stomach     |           |         |          |           |
+----------------------+----------------------+-----------+---------+----------+-----------+
|   predniSONE         | Take 1 tablet by     |   90      | 3       | 20 |  |
| (DELTASONE) 5 mg     | mouth Daily.         | tablet    |         | 14       | 5         |
| tablet               |                      |           |         |          |           |
+----------------------+----------------------+-----------+---------+----------+-----------+
|   Prenatal           | Take 0.8 mg by mouth |   30 each | 11      | 20 |  |
| Multivit-Min-Fe-FA   |  Daily.              |           |         | 13       | 4         |
| (PRENATAL VITAMINS)  |                      |           |         |          |           |
| 0.8 MG TABS          |                      |           |         |          |           |
+----------------------+----------------------+-----------+---------+----------+-----------+
|   Prenatal Vit-Fe    |                      |           | 0       | 20 |  |
| Fumarate-FA (PNV     |                      |           |         | 13       | 4         |
| PRENATAL PLUS        |                      |           |         |          |           |
| MULTIVITAMIN) 27-1   |                      |           |         |          |           |
| MG TABS              |                      |           |         |          |           |
+----------------------+----------------------+-----------+---------+----------+-----------+
|   rosuvastatin       | Take 1 tablet by     |   30      | 11      | 20 |  |
| (CRESTOR) 20 mg      | mouth nightly.       | tablet    |         | 13       | 4         |
| tablet               |                      |           |         |          |           |
+----------------------+----------------------+-----------+---------+----------+-----------+
|   tacrolimus         | TAD.                 |           | 0       | 20 | 07/15/201 |
 
| (PROGRAF) 0.5 mg     |                      |           |         | 12       | 4         |
| capsuleIndications:  |                      |           |         |          |           |
| Unspecified          |                      |           |         |          |           |
| hypertensive kidney  |                      |           |         |          |           |
| disease with chronic |                      |           |         |          |           |
|  kidney disease      |                      |           |         |          |           |
| stage I through      |                      |           |         |          |           |
| stage IV, or         |                      |           |         |          |           |
| unspecified(403.90), |                      |           |         |          |           |
|  Complications of    |                      |           |         |          |           |
| transplanted kidney, |                      |           |         |          |           |
|  Diabetes mellitus   |                      |           |         |          |           |
| type II,             |                      |           |         |          |           |
| uncontrolled (HCC),  |                      |           |         |          |           |
| Hyperlipidemia       |                      |           |         |          |           |
+----------------------+----------------------+-----------+---------+----------+-----------+
|   tacrolimus         | Take 1.5 mg by mouth |           | 0       | 20 |  |
| (PROGRAF) 1 mg       |  2 times daily.      |           |         | 13       | 4         |
| capsuleIndications:  |                      |           |         |          |           |
| Unspecified          |                      |           |         |          |           |
| hypertensive kidney  |                      |           |         |          |           |
| disease with chronic |                      |           |         |          |           |
|  kidney disease      |                      |           |         |          |           |
| stage I through      |                      |           |         |          |           |
| stage IV, or         |                      |           |         |          |           |
| unspecified(403.90), |                      |           |         |          |           |
|  FSGS (focal         |                      |           |         |          |           |
| segmental            |                      |           |         |          |           |
| glomerulosclerosis), |                      |           |         |          |           |
|  Hyperlipidemia,     |                      |           |         |          |           |
| Complications of     |                      |           |         |          |           |
| transplanted kidney  |                      |           |         |          |           |
+----------------------+----------------------+-----------+---------+----------+-----------+
|   valsartan (DIOVAN) | Take 1 tablet by     |   30      | 11      | 20 |  |
|  160 mg tablet       | mouth Daily.         | tablet    |         | 14       | 4         |
+----------------------+----------------------+-----------+---------+----------+-----------+
 documented as of this encounter
 
 Plan of Treatment
 
 
+--------+-----------+------------+----------------------+-------------------+
| Date   | Type      | Specialty  | Care Team            | Description       |
+--------+-----------+------------+----------------------+-------------------+
| / | Office    | Cardiology |   Tonia Wilson,    |                   |
|    | Visit     |            | ARNP  [address]   |                   |
|        |           |            | [place]  |                   |
|        |           |            | [address]  [phone]  |                   |
|        |           |            |  [phone] (Fax)  |                   |
+--------+-----------+------------+----------------------+-------------------+
| / | Hospital  | Radiology  |   Popeye Townsend, |                   |
|    | Encounter |            |  MD  [address] |                   |
|        |           |            |  [place],   |                   |
|        |           |            | [place] [address]             |                   |
|        |           |            | [phone]         |                   |
|        |           |            | [phone] (Fax)   |                   |
+--------+-----------+------------+----------------------+-------------------+
| / | Surgery   | Radiology  |   Popeye Townsend, | CV EP PPM SYSTEM  |
|    |           |            |  MD  [address] | IMPLANT           |
|        |           |            |  St.  [place],   |                   |
 
|        |           |            | [place] [address]             |                   |
|        |           |            | [phone]         |                   |
|        |           |            | [phone] (Fax)   |                   |
+--------+-----------+------------+----------------------+-------------------+
| 02/10/ | Clinical  | Cardiology |                      |                   |
|    | Support   |            |                      |                   |
+--------+-----------+------------+----------------------+-------------------+
| / | Office    | Cardiology |   Tonia Wilson,    |                   |
|    | Visit     |            | ARNP  [address]   |                   |
|        |           |            | [place]  |                   |
|        |           |            | [address]  [phone]  |                   |
|        |           |            |  [phone] (Fax)  |                   |
+--------+-----------+------------+----------------------+-------------------+
| / | Off-Site  | Nephrology |   Marzena Moser  |                   |
|    | Visit     |            | M, DO  [address]      |                   |
|        |           |            | [address]      |                   |
|        |           |            | [place]      |                   |
|        |           |            | [address]  [phone]  |                   |
|        |           |            |  [phone] (Fax)  |                   |
+--------+-----------+------------+----------------------+-------------------+
 documented as of this encounter
 
 Procedures
 
 
+----------------------+--------+-------------+----------------------+----------------------
+
| Procedure Name       | Priori | Date/Time   | Associated Diagnosis | Comments             
|
|                      | ty     |             |                      |                      
|
+----------------------+--------+-------------+----------------------+----------------------
+
| PFT PULMONARY        | ASAP   | 2014  |   Cough  Dyspnea     |   Results for this   
|
| FUNCTION TESTING     |        |  7:13 AM    |                      | procedure are in the 
|
| ORDERS               |        | PDT         |                      |  results section.    
|
+----------------------+--------+-------------+----------------------+----------------------
+
| PFT PULMONARY        | ASAP   | 2014  |   Cough  Dyspnea     |   Results for this   
|
| FUNCTION TESTING     |        |  7:13 AM    |                      | procedure are in the 
|
| ORDERS               |        | PDT         |                      |  results section.    
|
+----------------------+--------+-------------+----------------------+----------------------
+
| PFT PULMONARY        | ASAP   | 2014  |   Cough  Dyspnea     |   Results for this   
|
| FUNCTION TESTING     |        |  7:13 AM    |                      | procedure are in the 
|
| ORDERS               |        | PDT         |                      |  results section.    
|
+----------------------+--------+-------------+----------------------+----------------------
+
| PFT PULMONARY        | ASAP   | 2014  |   Cough  Dyspnea     |   Results for this   
|
| FUNCTION TESTING     |        |  7:13 AM    |                      | procedure are in the 
 
|
| ORDERS               |        | PDT         |                      |  results section.    
|
+----------------------+--------+-------------+----------------------+----------------------
+
| DIAGNOSTIC REPORT -  |        | 04/15/2014  |                      |                      
|
| EXTERNAL SCAN        |        | 12:00 AM    |                      |                      
|
|                      |        | PDT         |                      |                      
|
+----------------------+--------+-------------+----------------------+----------------------
+
 documented in this encounter
 
 Results
 PFT PULMONARY FUNCTION TESTING ORDERS Full PFT (Ebony w/BD, lung volumes, diffusion)?: Yes 
(2014  7:13 AM PDT)
 
+------------------------------------------------------------------------+--------------+
| Narrative                                                              | Performed At |
+------------------------------------------------------------------------+--------------+
|   Nena Boykin MD       2014   7:13        PULMONARY     |              |
| FUNCTION TESTING   SPIROMETRY: FVC was normal at 2.60 L or 81% of      |              |
| predicted. FEV1   was normal at 2.15 L or 87% of predicted. FEV1/FVC   |              |
| ratio was   normal at 83%.   LUNG VOLUMES: Total lung capacity was     |              |
| normal at 4.28 L or 80% of   predicted. Residual volume was moderately |              |
|  reduced at 1.29 L or   58% of predicted. RV/TLC ratio was mildly      |              |
| reduced at 30% or 71 %   of predicted. ERV was significantly reduced   |              |
| at 0.26 L or 24% of   predicted.   DIFFUSION CAPACITY: Diffusion       |              |
| capacity was moderately reduced at   14.1 mL/mmHg per minute or 56% of |              |
|  predicted and was not corrected   for a measured hemoglobin.          |              |
| IMPRESSION: Spirometry is consistent with normal physiology. Lung      |              |
| volume testing is consistent with suggests possible mild   restrictive |              |
|  physiology physiology. Diffusion capacity is   moderately reduced and |              |
|  is not corrected for measured hemoglobin.   Spriometry has not        |              |
| changed significantly since 2013.   Since 2013, there |              |
|  has been a drop in FVC and FEV1 that   exceeds 10% (exact percent not |              |
|  calculated). DLCO has increased   since that time. Neither DLCO value |              |
|  was corrected for a measured   hemoglobin.     Electronically signed  |              |
| by: Nena Boykin MD 2014   7:06  [facility]   |              |
| [facility]     CC: Marzena Anandmaxx                         |              |
+------------------------------------------------------------------------+--------------+
 
 
 
+------------------------------------------------------------------------------------------+
| Procedure Note                                                                           |
+------------------------------------------------------------------------------------------+
|   Nena Boykin MD - 2014  7:06 AM PDT  PULMONARY FUNCTION TESTING        |
| SPIROMETRY: FVC was normal at 2.60 L or 81% of predicted. FEV1 was normal at 2.15 L or   |
| 87% of predicted. FEV1/FVC ratio was normal at 83%. LUNG VOLUMES: Total lung capacity    |
| was normal at 4.28 L or 80% of predicted. Residual volume was moderately reduced at 1.29 |
|  L or 58% of predicted. RV/TLC ratio was mildly reduced at 30% or 71 % of predicted. ERV |
|  was significantly reduced at 0.26 L or 24% of predicted. DIFFUSION CAPACITY: Diffusion  |
| capacity was moderately reduced at 14.1 mL/mmHg per minute or 56% of predicted and was   |
| not corrected for a measured hemoglobin.IMPRESSION: Spirometry is consistent with normal |
|  physiology. Lung volume testing is consistent with suggests possible mild restrictive   |
| physiology physiology. Diffusion capacity is moderately reduced and is not corrected for |
|  measured hemoglobin. Spriometry has not changed significantly since 2013. Since |
 
|  2013, there has been a drop in FVC and FEV1 that exceeds 10% (exact percent    |
| not calculated). DLCO has increased since that time. Neither DLCO value was corrected    |
| for a measured hemoglobin.Electronically signed by: Nena Boykin MD 2014  |
| 7:06[facility]oser                       |
+------------------------------------------------------------------------------------------+
 documented in this encounter
 
 Visit Diagnoses
 
 
+------------------------------------------------------+
| Diagnosis                                            |
+------------------------------------------------------+
|   Cough                                              |
+------------------------------------------------------+
|   Dyspnea  Other dyspnea and respiratory abnormality |
+------------------------------------------------------+
 documented in this encounter

## 2020-01-08 NOTE — XMS
Encounter Summary
  Created on: 2020
 
 Viviana Ricci
 External Reference #: [id number]
 : 47
 Sex: Female
 
 Demographics
 
 
+-----------------------+----------------------+
| Address               | [address]      |
|                       | [place]  [address] |
+-----------------------+----------------------+
| Home Phone            | [phone]      |
+-----------------------+----------------------+
| Preferred Language    | Unknown              |
+-----------------------+----------------------+
| Marital Status        | Single               |
+-----------------------+----------------------+
| Muslim Affiliation | [id number]                 |
+-----------------------+----------------------+
| Race                  | Unknown              |
+-----------------------+----------------------+
| Ethnic Group          | Unknown              |
+-----------------------+----------------------+
 
 
 Author
 
 
+--------------+--------------------------------------------+
| Author       | [facility]  |
|              | and [facility]                                |
+--------------+--------------------------------------------+
| Organization | [facility]  |
|              | and [place]                                |
+--------------+--------------------------------------------+
| Address      | Unknown                                    |
+--------------+--------------------------------------------+
| Phone        | Unavailable                                |
+--------------+--------------------------------------------+
 
 
 
 Support
 
 
+----------------+--------------+---------------------+-----------------+
| Name           | Relationship | Address             | Phone           |
+----------------+--------------+---------------------+-----------------+
| Ada/Ed Radhames | ECON         | [address]              | [phone] |
|                |              | [place]  |                 |
|                |              |  [id number]              |                 |
+----------------+--------------+---------------------+-----------------+
 
 
 
 
 Care Team Providers
 
 
+-----------------------+------+-------------+
| Care Team Member Name | Role | Phone       |
+-----------------------+------+-------------+
 PCP  | Unavailable |
+-----------------------+------+-------------+
 
 
 
 Encounter Details
 
 
+--------+-------------+----------------------+----------------------+----------------------
+
| Date   | Type        | Department           | Care Team            | Description          
|
+--------+-------------+----------------------+----------------------+----------------------
+
| / | Orders Only |   [facility]          |   Maricruz Flanagan, | JACK on CPAP; Obesity 
|
|    |             | PULMONARY  [address]     |  RN                  |  hypoventilation     
|
|        |             | [place]  [place], |                      | syndrome (HCC)       
|
|        |             |  [place] [address]       |                      |                      
|
|        |             | [phone]         |                      |                      
|
+--------+-------------+----------------------+----------------------+----------------------
+
 
 
 
 Social History
 
 
+--------------+-------+-----------+--------+------+
| Tobacco Use  | Types | Packs/Day | Years  | Date |
|              |       |           | Used   |      |
+--------------+-------+-----------+--------+------+
| Never Smoker |       |           |        |      |
+--------------+-------+-----------+--------+------+
 
 
 
+---------------------+---+---+---+
| Smokeless Tobacco:  |   |   |   |
| Never Used          |   |   |   |
+---------------------+---+---+---+
 
 
 
+---------------------------------------------------------------+
| Comments: some second hand smoke exposure, but fairly minimal |
+---------------------------------------------------------------+
 
 
 
 
+-------------+-------------+---------+----------+
| Alcohol Use | Drinks/Week | oz/Week | Comments |
+-------------+-------------+---------+----------+
| No          |             |         |          |
+-------------+-------------+---------+----------+
 
 
 
+------------------+---------------+
| Sex Assigned at  | Date Recorded |
| Birth            |               |
+------------------+---------------+
| Not on file      |               |
+------------------+---------------+
 
 
 
+----------------+-------------+-------------+
| Job Start Date | Occupation  | Industry    |
+----------------+-------------+-------------+
| Not on file    | Not on file | Not on file |
+----------------+-------------+-------------+
 
 
 
+----------------+--------------+------------+
| Travel History | Travel Start | Travel End |
+----------------+--------------+------------+
 
 
 
+-------------------------------------+
| No recent travel history available. |
+-------------------------------------+
 documented as of this encounter
 
 Plan of Treatment
 
 
+--------+-----------+------------+----------------------+-------------------+
| Date   | Type      | Specialty  | Care Team            | Description       |
+--------+-----------+------------+----------------------+-------------------+
| / | Office    | Cardiology |   Tonia Wilson,    |                   |
|    | Visit     |            | BELKIS  [address]   |                   |
|        |           |            | [place]  |                   |
|        |           |            | [id number] [phone]  |                   |
|        |           |            |  [phone] (Fax)  |                   |
+--------+-----------+------------+----------------------+-------------------+
| / | Hospital  | Radiology  |   Popeye Townsend, |                   |
|    | Encounter |            |  MD  [address] |                   |
|        |           |            |  St.  [place],   |                   |
|        |           |            | [place] [address]             |                   |
|        |           |            | [phone]         |                   |
|        |           |            | [phone] (Fax)   |                   |
+--------+-----------+------------+----------------------+-------------------+
| / | Surgery   | Radiology  |   Popeye Townsend, | CV EP PPM SYSTEM  |
|    |           |            |  MD  [address] | IMPLANT           |
|        |           |            |  St.  [place],   |                   |
|        |           |            | [place] [address]             |                   |
 
|        |           |            | [phone]         |                   |
|        |           |            | [phone] (Fax)   |                   |
+--------+-----------+------------+----------------------+-------------------+
| 02/10/ | Clinical  | Cardiology |                      |                   |
|    | Support   |            |                      |                   |
+--------+-----------+------------+----------------------+-------------------+
| / | Office    | Cardiology |   Tonia Wilson,    |                   |
|    | Visit     |            | [address]   |                   |
|        |           |            | St  [place], [place]  |                   |
|        |           |            | [address]  [phone]  |                   |
|        |           |            |  [phone] (Fax)  |                   |
+--------+-----------+------------+----------------------+-------------------+
| / | Off-Site  | Nephrology |   Marzena Moser  |                   |
| 2020   | Visit     |            | M,   [address]      |                   |
|        |           |            | [address]      |                   |
|        |           |            | [place]      |                   |
|        |           |            | [address]  [phone]  |                   |
|        |           |            |  [phone] (Fax)  |                   |
+--------+-----------+------------+----------------------+-------------------+
 documented as of this encounter
 
 Visit Diagnoses
 
 
+----------------------------------------------------------------------------+
| Diagnosis                                                                  |
+----------------------------------------------------------------------------+
|   JACK on CPAP  Obstructive sleep apnea (adult) (pediatric)                 |
+----------------------------------------------------------------------------+
|   Obesity hypoventilation syndrome (HCC)  Obesity hypoventilation syndrome |
+----------------------------------------------------------------------------+
 documented in this encounter

## 2020-01-08 NOTE — XMS
Encounter Summary
  Created on: 2020
 
 Ras Hardy
 External Reference #: [id number]
 : 47
 Sex: Female
 
 Demographics
 
 
+-----------------------+----------------------+
| Address               | [address]      |
|                       | [place]  [address] |
+-----------------------+----------------------+
| Home Phone            | [phone]      |
+-----------------------+----------------------+
| Preferred Language    | Unknown              |
+-----------------------+----------------------+
| Marital Status        | Single               |
+-----------------------+----------------------+
| Mu-ism Affiliation | [id number]                 |
+-----------------------+----------------------+
| Race                  | Unknown              |
+-----------------------+----------------------+
| Ethnic Group          | Unknown              |
+-----------------------+----------------------+
 
 
 Author
 
 
+--------------+--------------------------------------------+
| Author       | [facility]  |
|              | and [facility]                                |
+--------------+--------------------------------------------+
| Organization | [facility]  |
|              | and [facility]                                |
+--------------+--------------------------------------------+
| Address      | Unknown                                    |
+--------------+--------------------------------------------+
| Phone        | Unavailable                                |
+--------------+--------------------------------------------+
 
 
 
 Support
 
 
+----------------+--------------+---------------------+-----------------+
| Name           | Relationship | Address             | Phone           |
+----------------+--------------+---------------------+-----------------+
| Ada/Ed Radhames | ECON         | [address]              | [phone] |
|                |              | [place], [place]  |                 |
|                |              |  [id number]              |                 |
+----------------+--------------+---------------------+-----------------+
 
 
 
 
 Care Team Providers
 
 
+-----------------------+------+-------------+
| Care Team Member Name | Role | Phone       |
+-----------------------+------+-------------+
 PCP  | Unavailable |
+-----------------------+------+-------------+
 
 
 
 Encounter Details
 
 
+--------+-----------+----------------------+----------------+----------------------+
| Date   | Type      | Department           | Care Team      | Description          |
+--------+-----------+----------------------+----------------+----------------------+
| / | [facility]  |   [facility] |   [place],  | Shortness of breath; |
|    | Encounter |  [facility] GENER[facility] OP  | Nena GUSMAN MD  |  Cough               |
|        |           | CONV DEPT  [address]     |                |                      |
|        |           | [address]  [place], |                |                      |
|        |           |  [place] [address]       |                |                      |
|        |           | [phone]         |                |                      |
+--------+-----------+----------------------+----------------+----------------------+
 
 
 
 Social History
 
 
+--------------+-------+-----------+--------+------+
| Tobacco Use  | Types | Packs/Day | Years  | Date |
|              |       |           | Used   |      |
+--------------+-------+-----------+--------+------+
| Never Smoker |       |           |        |      |
+--------------+-------+-----------+--------+------+
 
 
 
+---------------------+---+---+---+
| Smokeless Tobacco:  |   |   |   |
| Never Used          |   |   |   |
+---------------------+---+---+---+
 
 
 
+---------------------------------------------------------------+
| Comments: some second hand smoke exposure, but fairly minimal |
+---------------------------------------------------------------+
 
 
 
+-------------+-------------+---------+----------+
| Alcohol Use | Drinks/Week | oz/Week | Comments |
+-------------+-------------+---------+----------+
| No          |             |         |          |
+-------------+-------------+---------+----------+
 
 
 
 
+------------------+---------------+
| Sex Assigned at  | Date Recorded |
| Birth            |               |
+------------------+---------------+
| Not on file      |               |
+------------------+---------------+
 
 
 
+----------------+-------------+-------------+
| Job Start Date | Occupation  | Industry    |
+----------------+-------------+-------------+
| Not on file    | Not on file | Not on file |
+----------------+-------------+-------------+
 
 
 
+----------------+--------------+------------+
| Travel History | Travel Start | Travel End |
+----------------+--------------+------------+
 
 
 
+-------------------------------------+
| No recent travel history available. |
+-------------------------------------+
 documented as of this encounter
 
 Medications at Time of Discharge
 
 
+----------------------+----------------------+-----------+---------+----------+-----------+
| Medication           | Sig                  | Dispensed | Refills | Start    | End Date  |
|                      |                      |           |         | Date     |           |
+----------------------+----------------------+-----------+---------+----------+-----------+
|   glucose blood VI   | Check blood sugar    |   100     | 12      | 20 |           |
| test strips (ONE     | before each meal and | each      |         | 12       |           |
| TOUCH ULTRA TEST)    |  as directed         |           |         |          |           |
| stripIndications:    |                      |           |         |          |           |
| Unspecified          |                      |           |         |          |           |
| hypertensive kidney  |                      |           |         |          |           |
| disease with chronic |                      |           |         |          |           |
|  kidney disease      |                      |           |         |          |           |
| stage I through      |                      |           |         |          |           |
| stage IV, or         |                      |           |         |          |           |
| unspecified(403.90), |                      |           |         |          |           |
|  Complications of    |                      |           |         |          |           |
| transplanted kidney, |                      |           |         |          |           |
|  Diabetes mellitus   |                      |           |         |          |           |
| type II,             |                      |           |         |          |           |
| uncontrolled ([facility]),  |                      |           |         |          |           |
| Hyperlipidemia       |                      |           |         |          |           |
+----------------------+----------------------+-----------+---------+----------+-----------+
|   albuterol (PROAIR  | Inhale 2 puffs into  |   1       | 2       | 20 |  |
| HFA) 90 mcg/puff     | the lungs every 6    | Inhaler   |         | 13       | 4         |
| inhalerIndications:  | hours as needed for  |           |         |          |           |
| Cough                | Wheezing.            |           |         |          |           |
+----------------------+----------------------+-----------+---------+----------+-----------+
|   allopurinol        | Take 100 mg by mouth |           | 0       | 20 |  |
 
| (ZYLOPRIM) 100 mg    |  Daily.              |           |         | 12       | 3         |
| tablet               |                      |           |         |          |           |
+----------------------+----------------------+-----------+---------+----------+-----------+
|   aspirin 81 MG EC   | Take 81 mg by mouth  |           | 0       | 20 |  |
| tablet               | Daily.               |           |         | 12       | 5         |
+----------------------+----------------------+-----------+---------+----------+-----------+
|   Cholecalciferol    | Take 5,000 Units by  |           | 0       | 20 |  |
| (VITAMIN D3) 5000    | mouth Once a week.   |           |         | 12       | 4         |
| UNITS CAPS           |                      |           |         |          |           |
+----------------------+----------------------+-----------+---------+----------+-----------+
|   cinacalcet         | Take 1 tablet by     |   30      | 12      | 10/29/20 |  |
| (SENSIPAR) 30 mg     | mouth Daily.         | tablet    |         | 12       | 3         |
| tablet               |                      |           |         |          |           |
+----------------------+----------------------+-----------+---------+----------+-----------+
|   fludrocortisone    | Take 1 tablet by     |   30      | 11      | 10/01/20 |  |
| (FLORINEF) 0.1 mg    | mouth Every other    | tablet    |         | 12       | 4         |
| tablet               | day.                 |           |         |          |           |
+----------------------+----------------------+-----------+---------+----------+-----------+
|   furosemide (LASIX) | Take two tablets by  |   60      | 5       | 20 | 07/15/201 |
|  40 mg tablet        | mouth daily.         | tablet    |         | 12       | 3         |
+----------------------+----------------------+-----------+---------+----------+-----------+
|   insulin glargine   | Inject 20 Units      |           | 0       | 20 |  |
| (LANTUS) 100         | under the skin every |           |         | 12       | 3         |
| units/mL             |  morning.            |           |         |          |           |
| injectionIndications |                      |           |         |          |           |
| : Unspecified        |                      |           |         |          |           |
| hypertensive kidney  |                      |           |         |          |           |
| disease with chronic |                      |           |         |          |           |
|  kidney disease      |                      |           |         |          |           |
| stage I through      |                      |           |         |          |           |
| stage IV, or         |                      |           |         |          |           |
| unspecified(403.90), |                      |           |         |          |           |
|  Complications of    |                      |           |         |          |           |
| transplanted kidney, |                      |           |         |          |           |
|  Diabetes mellitus   |                      |           |         |          |           |
| type II,             |                      |           |         |          |           |
| uncontrolled (HCC),  |                      |           |         |          |           |
| Hyperlipidemia       |                      |           |         |          |           |
+----------------------+----------------------+-----------+---------+----------+-----------+
|   insulin lispro     | Inject               |           | 0       | 20 |  |
| (HUMALOG) 100        | subcutaneously       |           |         | 12       | 3         |
| units/mL injection   | before meals         |           |         |          |           |
|                      | according to sliding |           |         |          |           |
|                      |  scale               |           |         |          |           |
+----------------------+----------------------+-----------+---------+----------+-----------+
|   Insulin            | Use before meals and |   100     | 11      | 20 |  |
| Syringe-Needle U-100 |  as directed.        | each      |         | 13       | 4         |
|  (BD INSULIN SYRINGE |                      |           |         |          |           |
|  ULTRAFINE) 31G X    |                      |           |         |          |           |
| " 0.5 ML MISC    |                      |           |         |          |           |
+----------------------+----------------------+-----------+---------+----------+-----------+
|   levothyroxine      | Take 1 tablet by     |   30      | 11      | 10/01/20 |  |
| (LEVOTHROID) 50 mcg  | mouth Daily.         | tablet    |         | 12       | 3         |
| tablet               |                      |           |         |          |           |
+----------------------+----------------------+-----------+---------+----------+-----------+
|   lisinopril         | Take 1 tablet by     |   90      | 3       | 20 |  |
| (PRINIVIL,ZESTRIL)   | mouth Daily.         | tablet    |         | 12       | 3         |
| 30 MG tablet         |                      |           |         |          |           |
+----------------------+----------------------+-----------+---------+----------+-----------+
|   loperamide         | Take 2 mg by mouth   |           | 0       | 20 |  |
 
| (ANTI-DIARRHEAL) 2   | Daily as needed.     |           |         | 12       | 4         |
| mg capsule           |                      |           |         |          |           |
+----------------------+----------------------+-----------+---------+----------+-----------+
|   magnesium oxide    | Take 400 mg by mouth |           | 0       | 20 |  |
| (MAG-OX) 400 mg      |  2 times daily.      |           |         | 12       | 3         |
| tablet               |                      |           |         |          |           |
+----------------------+----------------------+-----------+---------+----------+-----------+
|   metoprolol         | Take 1 tablet by     |   60      | 11      | 20 |  |
| tartrate (LOPRESSOR) | mouth 2 times daily. | tablet    |         | 13       | 4         |
|  25 mg tablet        |                      |           |         |          |           |
+----------------------+----------------------+-----------+---------+----------+-----------+
|   mycophenolate      | Take 250 mg by mouth |           | 0       | 20 | 10/14/201 |
| (CELLCEPT) 250 mg    |  3 times daily.      |           |         | 11       | 5         |
| capsule              |                      |           |         |          |           |
+----------------------+----------------------+-----------+---------+----------+-----------+
|   omeprazole         | Take one capsule by  |           | 0       | 20 |  |
| (PRILOSEC) 20 mg     | mouth once daily on  |           |         | 12       | 3         |
| capsule              | an empty stomach     |           |         |          |           |
+----------------------+----------------------+-----------+---------+----------+-----------+
|   predniSONE         | Take 1 tablet by     |   90      | 3       | 20 |  |
| (DELTASONE) 5 mg     | mouth Daily.         | tablet    |         | 12       | 4         |
| tablet               |                      |           |         |          |           |
+----------------------+----------------------+-----------+---------+----------+-----------+
|   Prenatal           | Take 0.8 mg by mouth |   30 each | 11      | 20 |  |
| Multivit-Min-Fe-FA   |  Daily.              |           |         | 13       | 3         |
| (PRENATAL VITAMINS)  |                      |           |         |          |           |
| 0.8 MG TABS          |                      |           |         |          |           |
+----------------------+----------------------+-----------+---------+----------+-----------+
|   rosuvastatin       | Take 20 mg by mouth  |           | 0       |          |  |
| (CRESTOR) 40 MG      | nightly.             |           |         |          | 3         |
| tabletIndications:   |                      |           |         |          |           |
| Unspecified          |                      |           |         |          |           |
| hypertensive kidney  |                      |           |         |          |           |
| disease with chronic |                      |           |         |          |           |
|  kidney disease      |                      |           |         |          |           |
| stage I through      |                      |           |         |          |           |
| stage IV, or         |                      |           |         |          |           |
| unspecified(403.90), |                      |           |         |          |           |
|  Complications of    |                      |           |         |          |           |
| transplanted kidney, |                      |           |         |          |           |
|  Diabetes mellitus   |                      |           |         |          |           |
| type II,             |                      |           |         |          |           |
| uncontrolled (HCC),  |                      |           |         |          |           |
| Hyperlipidemia       |                      |           |         |          |           |
+----------------------+----------------------+-----------+---------+----------+-----------+
|   tacrolimus         | TAD.                 |           | 0       | 20 | 07/15/201 |
| (PROGRAF) 0.5 mg     |                      |           |         | 12       | 4         |
| capsuleIndications:  |                      |           |         |          |           |
| Unspecified          |                      |           |         |          |           |
| hypertensive kidney  |                      |           |         |          |           |
| disease with chronic |                      |           |         |          |           |
|  kidney disease      |                      |           |         |          |           |
| stage I through      |                      |           |         |          |           |
| stage IV, or         |                      |           |         |          |           |
| unspecified(403.90), |                      |           |         |          |           |
|  Complications of    |                      |           |         |          |           |
| transplanted kidney, |                      |           |         |          |           |
|  Diabetes mellitus   |                      |           |         |          |           |
| type II,             |                      |           |         |          |           |
| uncontrolled (HCC),  |                      |           |         |          |           |
 
| Hyperlipidemia       |                      |           |         |          |           |
+----------------------+----------------------+-----------+---------+----------+-----------+
|   tacrolimus         | Take 1 mg by mouth 2 |           | 0       | 20 |  |
| (PROGRAF) 1 mg       |  times daily.        |           |         | 12       | 3         |
| capsuleIndications:  |                      |           |         |          |           |
| Unspecified          |                      |           |         |          |           |
| hypertensive kidney  |                      |           |         |          |           |
| disease with chronic |                      |           |         |          |           |
|  kidney disease      |                      |           |         |          |           |
| stage I through      |                      |           |         |          |           |
| stage IV, or         |                      |           |         |          |           |
| unspecified(403.90), |                      |           |         |          |           |
|  Complications of    |                      |           |         |          |           |
| transplanted kidney, |                      |           |         |          |           |
|  Diabetes mellitus   |                      |           |         |          |           |
| type II,             |                      |           |         |          |           |
| uncontrolled (HCC),  |                      |           |         |          |           |
| Hyperlipidemia       |                      |           |         |          |           |
+----------------------+----------------------+-----------+---------+----------+-----------+
|   valsartan (DIOVAN) | Take 1 tablet by     |   30      | 11      | 20 |  |
|  160 mg tablet       | mouth Daily.         | tablet    |         | 13       | 4         |
+----------------------+----------------------+-----------+---------+----------+-----------+
 documented as of this encounter
 
 Plan of Treatment
 
 
+--------+-----------+------------+----------------------+-------------------+
| Date   | Type      | Specialty  | Care Team            | Description       |
+--------+-----------+------------+----------------------+-------------------+
| / | Office    | Cardiology |   Tonia Wilson,    |                   |
|    | Visit     |            | ARNP  [address]   |                   |
|        |           |            | St  [place]  |                   |
|        |           |            | [address]  [phone]  |                   |
|        |           |            |  [phone] (Fax)  |                   |
+--------+-----------+------------+----------------------+-------------------+
| / | Hospital  | Radiology  |   Popeye Townsend, |                   |
|    | Encounter |            |  MD  [address] |                   |
|        |           |            |  St.  [place],   |                   |
|        |           |            | [place] [address]             |                   |
|        |           |            | [phone]         |                   |
|        |           |            | [phone] (Fax)   |                   |
+--------+-----------+------------+----------------------+-------------------+
| / | Surgery   | Radiology  |   Popeye Townsend, | CV EP PPM SYSTEM  |
|    |           |            |  MD  [address] | IMPLANT           |
|        |           |            |  St.  [place],   |                   |
|        |           |            | [place] [address]             |                   |
|        |           |            | [phone]         |                   |
|        |           |            | [phone] (Fax)   |                   |
+--------+-----------+------------+----------------------+-------------------+
| 02/10/ | Clinical  | Cardiology |                      |                   |
|    | Support   |            |                      |                   |
+--------+-----------+------------+----------------------+-------------------+
| / | Office    | Cardiology |   Tonia Wilson,    |                   |
|    | Visit     |            | ARNJESSICA  [address]   |                   |
|        |           |            | St  [place], [place]  |                   |
|        |           |            | [address]  [phone]  |                   |
|        |           |            |  [phone] (Fax)  |                   |
+--------+-----------+------------+----------------------+-------------------+
| / | Off-Site  | Nephrology |   Shanti Marzena  |                   |
 
|    | Visit     |            | M, DO  [address]      |                   |
|        |           |            | [address]      |                   |
|        |           |            | [place]      |                   |
|        |           |            | [address]  [phone]  |                   |
|        |           |            |  [phone] (Fax)  |                   |
+--------+-----------+------------+----------------------+-------------------+
 documented as of this encounter
 
 Procedures
 
 
+---------------------+--------+-------------+----------------------+----------------------+
| Procedure Name      | Priori | Date/Time   | Associated Diagnosis | Comments             |
|                     | ty     |             |                      |                      |
+---------------------+--------+-------------+----------------------+----------------------+
| XR CHEST PA AND     | Routin | 2013  |   Shortness of       |   Results for this   |
| LATERAL             | e      |  1:17 PM    | breath  Cough        | procedure are in the |
|                     |        | PDT         |                      |  results section.    |
+---------------------+--------+-------------+----------------------+----------------------+
| B TYPE NATRIURETIC  | Routin | 2013  |                      |   Results for this   |
| PEPTIDE             | e      | 10:01 AM    |                      | procedure are in the |
|                     |        | PDT         |                      |  results section.    |
+---------------------+--------+-------------+----------------------+----------------------+
| B TYPE NATRIURETIC  | Routin | 2013  |   Shortness of       |   Results for this   |
| PEPTIDE             | e      | 10:01 AM    | breath  Cough        | procedure are in the |
|                     |        | PDT         |                      |  results section.    |
+---------------------+--------+-------------+----------------------+----------------------+
 documented in this encounter
 
 Results
 XR Chest PA and Lateral (2013  1:17 PM PDT)
 
+----------+
| Specimen |
+----------+
|          |
+----------+
 
 
 
+------------------------------------------------------------------------+-----------------+
| Narrative                                                              | Performed At    |
+------------------------------------------------------------------------+-----------------+
|    [facility]      Diagnostic Imaging          |   [place]    |
| Department      [address] [facility], [place]      [phone]    | [facility]        |
|                             [   rep [address]]     [   rep [facility]  |
| st zip]     **** Signed ****                                           | - IMAGING       |
| ______________________________________________________________________ |                 |
| ______________________     Patient Name: RAS HARDY   Physician:     |                 |
| OFFRADHA.     : 1945    Age: 67   Sex: F     Unit #: [id number]    |                 |
|   Exam Date: 13     Acct #: [id number]   Location: [facility]         |                 |
| Report #: [id number]                      Page:                         |                 |
|   %(RAD)RES..mtdd.print.filter("pg") of   %(RAD)                       |                 |
| RES..mtdd.print.filter("tpg")                                          |                 |
| ______________________________________________________________________ |                 |
| ______________________     Accession Number: [id number]                |                 |
| CHEST X-RAY               CLINICAL HISTORY:   INCREASED SHORTNESS OF   |                 |
| BREATH AND COUGH.               COMPARISON:   Numerous previous chest  |                 |
| x-rays, most recently 2013.               FINDINGS:   PA and     |                 |
| lateral views of the chest were obtained. Sternotomy wires are         |                 |
 
| present. There is   mild scarring of the bilateral lung bases.         |                 |
| Bilateral lungs are otherwise clear. Heart is enlarged.   There is     |                 |
| atherosclerosis of the aorta. Moderate spondylosis is visualized of    |                 |
| the thoracic spine.               IMPRESSION:       1.   NO ACUTE      |                 |
| FINDINGS.               2.   CARDIOMEGALY.              3.   PREVIOUS  |                 |
| STERNOTOMY.              Dictated Date/Time:   2013 13:17        |                 |
| Transcribed Date/Time:   2013 13:19      :       |                 |
|                         <<Signature on File>>                     |                 |
| -----------------------------------------------------------     Derek  |                 |
| MD [id number] [id number]     <Electronically signed by Derek Reed MD>     |                 |
|            Derek Reed MD 13 1317     : Rich  |                 |
| [id number]/17/13 1319               Nena Boykin MD      |                 |
|                                                                        |                 |
+------------------------------------------------------------------------+-----------------+
 
 
 
+----------------------+---------------------+--------------------+----------------+
| Performing           | Address             | City/State/Zipcode | Phone Number   |
| Organization         |                     |                    |                |
+----------------------+---------------------+--------------------+----------------+
|   [facility]     |   [address] | [place]    |   [phone] |
| [facility]  |                     | [address]              |                |
| - IMAGING            |                     |                    |                |
+----------------------+---------------------+--------------------+----------------+
 B Type Natriuretic Peptide (2013 10:01 AM PDT)
 
+-----------+--------------------------+------------+-------------+--------------+
| Component | Value                    | Ref Range  | Performed   | Pathologist  |
|           |                          |            | At          | Signature    |
+-----------+--------------------------+------------+-------------+--------------+
| BNP       | 164 (H)Comment: Testing  | <100 pg/mL | [facility]  |              |
|           | performed on the [facility] |            | [facility]    |              |
|           |  [facility]          |            | MEDICAL     |              |
|           | Analyzer.                |            | CENTER -    |              |
|           |                          |            | LABORATORY  |              |
+-----------+--------------------------+------------+-------------+--------------+
 
 
 
+----------+
| Specimen |
+----------+
|          |
+----------+
 
 
 
+----------------------+--------------------+--------------------+----------------+
| Performing           | Address            | City/State/Zipcode | Phone Number   |
| Organization         |                    |                    |                |
+----------------------+--------------------+--------------------+----------------+
|   [facility]     |   [address] | [place]    |   [phone] |
| [facility]  |                    | [address]              |                |
| - LABORATORY         |                    |                    |                |
+----------------------+--------------------+--------------------+----------------+
|   [facility]     |   [address] | [place]    |                |
| [facility]  |                    | [address]              |                |
| - LABORATORY         |                    |                    |                |
+----------------------+--------------------+--------------------+----------------+
 
 B Type Natriuretic Peptide (2013 10:01 AM PDT)
 
+-----------+--------------------------+------------+-------------+--------------+
| Component | Value                    | Ref Range  | Performed   | Pathologist  |
|           |                          |            | At          | Signature    |
+-----------+--------------------------+------------+-------------+--------------+
| BNP       | 164 (H)Comment: Testing  | <100 pg/mL | [facility]  |              |
|           | performed on the [facility] |            | [facility]    |              |
|           |  [facility]          |            | MEDICAL     |              |
|           | Analyzer.                |            | CENTER -    |              |
|           |                          |            | LABORATORY  |              |
+-----------+--------------------------+------------+-------------+--------------+
 
 
 
+-----------------+
| Specimen        |
+-----------------+
| Blood specimen  |
| (specimen)      |
+-----------------+
 
 
 
+----------------------+--------------------+--------------------+----------------+
| Performing           | Address            | City/State/Zipcode | Phone Number   |
| Organization         |                    |                    |                |
+----------------------+--------------------+--------------------+----------------+
|   [facility]     |   [address] | [place], [place]    |   [phone] |
| [facility]  |                    | [address]              |                |
| - LABORATORY         |                    |                    |                |
+----------------------+--------------------+--------------------+----------------+
|   [facility]     |   [address] | [place], [place]    |                |
| [facility]  |                    | [address]              |                |
| - LABORATORY         |                    |                    |                |
+----------------------+--------------------+--------------------+----------------+
 documented in this encounter
 
 Visit Diagnoses
 
 
+-----------------------+
| Diagnosis             |
+-----------------------+
|   Shortness of breath |
+-----------------------+
|   Cough               |
+-----------------------+
 documented in this encounter

## 2020-01-08 NOTE — XMS
Encounter Summary
  Created on: 2020
 
 Viviana Ricci
 External Reference #: [id number]
 : 47
 Sex: Female
 
 Demographics
 
 
+-----------------------+----------------------+
| Address               | [address]      |
|                       | [place]  [address] |
+-----------------------+----------------------+
| Home Phone            | [phone]      |
+-----------------------+----------------------+
| Preferred Language    | Unknown              |
+-----------------------+----------------------+
| Marital Status        | Single               |
+-----------------------+----------------------+
| Christian Affiliation | [id number]                 |
+-----------------------+----------------------+
| Race                  | Unknown              |
+-----------------------+----------------------+
| Ethnic Group          | Unknown              |
+-----------------------+----------------------+
 
 
 Author
 
 
+--------------+--------------------------------------------+
| Author       | [facility]  |
|              | and [place]                                |
+--------------+--------------------------------------------+
| Organization | [facility]  |
|              | and [place]                                |
+--------------+--------------------------------------------+
| Address      | Unknown                                    |
+--------------+--------------------------------------------+
| Phone        | Unavailable                                |
+--------------+--------------------------------------------+
 
 
 
 Support
 
 
+----------------+--------------+---------------------+-----------------+
| Name           | Relationship | Address             | Phone           |
+----------------+--------------+---------------------+-----------------+
| Ada/Ed Radhames | ECON         | [address]              | [phone] |
|                |              | [place]  |                 |
|                |              |  [address]              |                 |
+----------------+--------------+---------------------+-----------------+
 
 
 
 
 Care Team Providers
 
 
+------------------------+------+-----------------+
| Care Team Member Name  | Role | Phone           |
+------------------------+------+-----------------+
| Marzena Moser DO | PCP  | [phone] |
+------------------------+------+-----------------+
 
 
 
 Reason for Visit
 
 
+-------------+----------+
| Reason      | Comments |
+-------------+----------+
| Lab Results |          |
+-------------+----------+
 
 
 
 Encounter Details
 
 
+--------+-----------+---------------------+----------------------+-------------+
| Date   | Type      | Department          | Care Team            | Description |
+--------+-----------+---------------------+----------------------+-------------+
| / | Telephone |   [facility]         |   Marzena Moser  | Lab Results |
| 2018   |           | NEPHROLOGY  [address]   | M, DO  [address]      |             |
|        |           | [address]   | [address]      |             |
|        |           | [place], [place]     | [place], [place]      |             |
|        |           | [address]          | [address]  [phone]  |             |
|        |           | [phone]        |  [phone] (Fax)  |             |
+--------+-----------+---------------------+----------------------+-------------+
 
 
 
 Social History
 
 
+--------------+-------+-----------+--------+------+
| Tobacco Use  | Types | Packs/Day | Years  | Date |
|              |       |           | Used   |      |
+--------------+-------+-----------+--------+------+
| Never Smoker |       |           |        |      |
+--------------+-------+-----------+--------+------+
 
 
 
+---------------------+---+---+---+
| Smokeless Tobacco:  |   |   |   |
| Never Used          |   |   |   |
+---------------------+---+---+---+
 
 
 
+---------------------------------------------------------------+
| Comments: some second hand smoke exposure, but fairly minimal |
 
+---------------------------------------------------------------+
 
 
 
+-------------+-------------+---------+----------+
| Alcohol Use | Drinks/Week | oz/Week | Comments |
+-------------+-------------+---------+----------+
| No          |             |         |          |
+-------------+-------------+---------+----------+
 
 
 
+------------------+---------------+
| Sex Assigned at  | Date Recorded |
| Birth            |               |
+------------------+---------------+
| Not on file      |               |
+------------------+---------------+
 
 
 
+----------------+-------------+-------------+
| Job Start Date | Occupation  | Industry    |
+----------------+-------------+-------------+
| Not on file    | Not on file | Not on file |
+----------------+-------------+-------------+
 
 
 
+----------------+--------------+------------+
| Travel History | Travel Start | Travel End |
+----------------+--------------+------------+
 
 
 
+-------------------------------------+
| No recent travel history available. |
+-------------------------------------+
 documented as of this encounter
 
 Plan of Treatment
 
 
+--------+-----------+------------+----------------------+-------------------+
| Date   | Type      | Specialty  | Care Team            | Description       |
+--------+-----------+------------+----------------------+-------------------+
| / | Office    | Cardiology |   Tonia Wilson,    |                   |
|    | Visit     |            | ARNJESSICA  [address]   |                   |
|        |           |            | St  [place], [place]  |                   |
|        |           |            | [address]  [phone]  |                   |
|        |           |            |  [phone] (Fax)  |                   |
+--------+-----------+------------+----------------------+-------------------+
| / | Hospital  | Radiology  |   Popeye Townsend, |                   |
|    | Encounter |            |  MD  [address] |                   |
|        |           |            |  St.  [place],   |                   |
|        |           |            | [place] [address]             |                   |
|        |           |            | [phone]         |                   |
|        |           |            | [phone] (Fax)   |                   |
+--------+-----------+------------+----------------------+-------------------+
| / | Surgery   | Radiology  |   Popeye Townsend, | CV EP PPM SYSTEM  |
 
|    |           |            |  MD  [address] | IMPLANT           |
|        |           |            |  St.  [place],   |                   |
|        |           |            | [place] [address]             |                   |
|        |           |            | [phone]         |                   |
|        |           |            | [phone] (Fax)   |                   |
+--------+-----------+------------+----------------------+-------------------+
| 02/10/ | Clinical  | Cardiology |                      |                   |
|    | Support   |            |                      |                   |
+--------+-----------+------------+----------------------+-------------------+
| / | Office    | Cardiology |   RakeshmaxxalfredoArlena,    |                   |
|    | Visit     |            | ARNP  [address]   |                   |
|        |           |            | [place]  |                   |
|        |           |            | [address]  [phone]  |                   |
|        |           |            |  [phone] (Fax)  |                   |
+--------+-----------+------------+----------------------+-------------------+
| / | Off-Site  | Nephrology |   Marzena Moser  |                   |
|    | Visit     |            | DO ETIENNE  [address]      |                   |
|        |           |            | [address]      |                   |
|        |           |            | [place]      |                   |
|        |           |            | [address]  [phone]  |                   |
|        |           |            |  [phone] (Fax)  |                   |
+--------+-----------+------------+----------------------+-------------------+
 documented as of this encounter
 
 Visit Diagnoses
 
 
+---------------------------------+
| Diagnosis                       |
+---------------------------------+
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
+---------------------------------+
 documented in this encounter

## 2020-01-08 NOTE — XMS
Encounter Summary
  Created on: 2020
 
 Viviana Ricci
 External Reference #: [id number]
 : 47
 Sex: Female
 
 Demographics
 
 
+-----------------------+----------------------+
| Address               | [address]      |
|                       | [place]  [address] |
+-----------------------+----------------------+
| Home Phone            | [phone]      |
+-----------------------+----------------------+
| Preferred Language    | Unknown              |
+-----------------------+----------------------+
| Marital Status        | Single               |
+-----------------------+----------------------+
| Protestant Affiliation | [id number]                 |
+-----------------------+----------------------+
| Race                  | Unknown              |
+-----------------------+----------------------+
| Ethnic Group          | Unknown              |
+-----------------------+----------------------+
 
 
 Author
 
 
+--------------+--------------------------------------------+
| Author       | [facility]  |
|              | and [facility]                                |
+--------------+--------------------------------------------+
| Organization | [facility]  |
|              | and [facility]                                |
+--------------+--------------------------------------------+
| Address      | Unknown                                    |
+--------------+--------------------------------------------+
| Phone        | Unavailable                                |
+--------------+--------------------------------------------+
 
 
 
 Support
 
 
+----------------+--------------+---------------------+-----------------+
| Name           | Relationship | Address             | Phone           |
+----------------+--------------+---------------------+-----------------+
| Ada/Ed Radhames | ECON         | [address]              | [phone] |
|                |              | [place]  |                 |
|                |              |  [id number]              |                 |
+----------------+--------------+---------------------+-----------------+
 
 
 
 
 Care Team Providers
 
 
+-----------------------+------+-------------+
| Care Team Member Name | Role | Phone       |
+-----------------------+------+-------------+
 PCP  | Unavailable |
+-----------------------+------+-------------+
 
 
 
 Reason for Visit
 
 
+-------------------+----------+
| Reason            | Comments |
+-------------------+----------+
| Medication Refill |          |
+-------------------+----------+
 
 
 
 Encounter Details
 
 
+--------+--------+---------------------+----------------------+-------------------+
| Date   | Type   | Department          | Care Team            | Description       |
+--------+--------+---------------------+----------------------+-------------------+
| / | Refill |   [facility]         |   Marzena Moser  | Medication Refill |
| 2017   |        | NEPHROLOGY  [address]   | M, DO  [address]      |                   |
|        |        | [address]   | [address]      |                   |
|        |        | [place], [place]     | [place], [place]      |                   |
|        |        | [address]          | [address]  [phone]  |                   |
|        |        | [phone]        |  [phone] (Fax)  |                   |
+--------+--------+---------------------+----------------------+-------------------+
 
 
 
 Social History
 
 
+--------------+-------+-----------+--------+------+
| Tobacco Use  | Types | Packs/Day | Years  | Date |
|              |       |           | Used   |      |
+--------------+-------+-----------+--------+------+
| Never Smoker |       |           |        |      |
+--------------+-------+-----------+--------+------+
 
 
 
+---------------------+---+---+---+
| Smokeless Tobacco:  |   |   |   |
| Never Used          |   |   |   |
+---------------------+---+---+---+
 
 
 
+---------------------------------------------------------------+
| Comments: some second hand smoke exposure, but fairly minimal |
 
+---------------------------------------------------------------+
 
 
 
+-------------+-------------+---------+----------+
| Alcohol Use | Drinks/Week | oz/Week | Comments |
+-------------+-------------+---------+----------+
| No          |             |         |          |
+-------------+-------------+---------+----------+
 
 
 
+------------------+---------------+
| Sex Assigned at  | Date Recorded |
| Birth            |               |
+------------------+---------------+
| Not on file      |               |
+------------------+---------------+
 
 
 
+----------------+-------------+-------------+
| Job Start Date | Occupation  | Industry    |
+----------------+-------------+-------------+
| Not on file    | Not on file | Not on file |
+----------------+-------------+-------------+
 
 
 
+----------------+--------------+------------+
| Travel History | Travel Start | Travel End |
+----------------+--------------+------------+
 
 
 
+-------------------------------------+
| No recent travel history available. |
+-------------------------------------+
 documented as of this encounter
 
 Plan of Treatment
 
 
+--------+-----------+------------+----------------------+-------------------+
| Date   | Type      | Specialty  | Care Team            | Description       |
+--------+-----------+------------+----------------------+-------------------+
| / | Office    | Cardiology |   HellalfredoTonia,    |                   |
|    | Visit     |            | ARNP  [address]   |                   |
|        |           |            | St  [place], [place]  |                   |
|        |           |            | [address]  [phone]  |                   |
|        |           |            |  [phone] (Fax)  |                   |
+--------+-----------+------------+----------------------+-------------------+
| / | Hospital  | Radiology  |   Popeye Townsend, |                   |
|    | Encounter |            |  MD  [address] |                   |
|        |           |            |  St.  [place],   |                   |
|        |           |            | [place] [address]             |                   |
|        |           |            | [phone]         |                   |
|        |           |            | [phone] (Fax)   |                   |
+--------+-----------+------------+----------------------+-------------------+
| / | Surgery   | Radiology  |   Popeye Townsend, | CV EP PPM SYSTEM  |
 
|    |           |            |  MD  [address] | IMPLANT           |
|        |           |            |  St.  [place],   |                   |
|        |           |            | [place] [address]             |                   |
|        |           |            | [phone]         |                   |
|        |           |            | [phone] (Fax)   |                   |
+--------+-----------+------------+----------------------+-------------------+
| 02/10/ | Clinical  | Cardiology |                      |                   |
|    | Support   |            |                      |                   |
+--------+-----------+------------+----------------------+-------------------+
| / | Office    | Cardiology |   Tonia Wilson,    |                   |
|    | Visit     |            | ARNP  [address]   |                   |
|        |           |            | [place]  |                   |
|        |           |            | [address]  [phone]  |                   |
|        |           |            |  [phone] (Fax)  |                   |
+--------+-----------+------------+----------------------+-------------------+
| / | Off-Site  | Nephrology |   Marzena Moser  |                   |
|    | Visit     |            | DO ETIENNE  [address] [place] [address]      |                   |
|        |           |            | [address]      |                   |
|        |           |            | [place]      |                   |
|        |           |            | [address]  [phone]  |                   |
|        |           |            |  [phone] (Fax)  |                   |
+--------+-----------+------------+----------------------+-------------------+
 documented as of this encounter
 
 Visit Diagnoses
 Not on filedocumented in this encounter

## 2020-01-08 NOTE — XMS
Encounter Summary
  Created on: 2020
 
 Viviana Ricci
 External Reference #: [id number]
 : 47
 Sex: Female
 
 Demographics
 
 
+-----------------------+----------------------+
| Address               | [address]      |
|                       | [place]  [address] |
+-----------------------+----------------------+
| Home Phone            | [phone]      |
+-----------------------+----------------------+
| Preferred Language    | Unknown              |
+-----------------------+----------------------+
| Marital Status        | Single               |
+-----------------------+----------------------+
| Jew Affiliation | [id number]                 |
+-----------------------+----------------------+
| Race                  | Unknown              |
+-----------------------+----------------------+
| Ethnic Group          | Unknown              |
+-----------------------+----------------------+
 
 
 Author
 
 
+--------------+--------------------------------------------+
| Author       | [facility]  |
|              | and [facility]                                |
+--------------+--------------------------------------------+
| Organization | [facility]  |
|              | and [facility]                                |
+--------------+--------------------------------------------+
| Address      | Unknown                                    |
+--------------+--------------------------------------------+
| Phone        | Unavailable                                |
+--------------+--------------------------------------------+
 
 
 
 Support
 
 
+----------------+--------------+---------------------+-----------------+
| Name           | Relationship | Address             | Phone           |
+----------------+--------------+---------------------+-----------------+
| Ada/Ed Radhames | ECON         | [address]              | [phone] |
|                |              | [place]  |                 |
|                |              |  [id number]              |                 |
+----------------+--------------+---------------------+-----------------+
 
 
 
 
 Care Team Providers
 
 
+------------------------+------+-----------------+
| Care Team Member Name  | Role | Phone           |
+------------------------+------+-----------------+
| Marzena Moser PCP  | [phone] |
+------------------------+------+-----------------+
 
 
 
 Reason for Referral
 Diagnostic/Screening (Routine)
 
+--------+--------+-----------+--------------+--------------+---------------+
| Status | Reason | Specialty | Diagnoses /  | Referred By  | Referred To   |
|        |        |           | Procedures   | Contact      | Contact       |
+--------+--------+-----------+--------------+--------------+---------------+
| Closed |        | Radiology |   Diagnoses  |   [place],  |   [facility]    |
|        |        |           |  Coronary    | Tonia, ARNP   | [address]  |
|        |        |           | artery       | [address] |  [place], |
|        |        |           | disease      |  St  [place]   |  [place]           |
|        |        |           | involving    | [place]    | [address]    |
|        |        |           | native       | [address]        | Phone:        |
|        |        |           | coronary     | Phone:       | [phone]  |
|        |        |           | artery of    | [phone] |  Fax:         |
|        |        |           | native heart |   Fax:       | [phone]  |
|        |        |           |  without     | [phone] |               |
|        |        |           | angina       |              |               |
|        |        |           | pectoris     |              |               |
|        |        |           | Hypertension |              |               |
|        |        |           | , essential  |              |               |
|        |        |           |  Mixed       |              |               |
|        |        |           | hyperlipidem |              |               |
|        |        |           | ia  Aortic   |              |               |
|        |        |           | valve        |              |               |
|        |        |           | insufficienc |              |               |
|        |        |           | y,           |              |               |
|        |        |           | unspecified  |              |               |
|        |        |           | etiology     |              |               |
|        |        |           | Pulmonary    |              |               |
|        |        |           | hypertension |              |               |
|        |        |           |  (HCC)       |              |               |
|        |        |           | Procedures   |              |               |
|        |        |           | ECHO         |              |               |
|        |        |           | Complete  OR |              |               |
|        |        |           |  ECHO HEART  |              |               |
|        |        |           | XTHORACIC,CO |              |               |
|        |        |           | MPLETE W     |              |               |
|        |        |           | DOPPLER  OR  |              |               |
|        |        |           | ECHO HEART   |              |               |
|        |        |           | XTHORACIC,CO |              |               |
|        |        |           | MPLETE, W/O  |              |               |
|        |        |           | DOPPLER      |              |               |
+--------+--------+-----------+--------------+--------------+---------------+
 
 
 
 
 
 Reason for Visit
 Diagnostic/Screening (Routine)
 
+--------+--------+-----------+--------------+--------------+---------------+
| Status | Reason | Specialty | Diagnoses /  | Referred By  | Referred To   |
|        |        |           | Procedures   | Contact      | Contact       |
+--------+--------+-----------+--------------+--------------+---------------+
| Closed |        | Radiology |   Diagnoses  |   [place],  |   [facility]    |
|        |        |           |  Coronary    | Tonia, ARNP   | [address]  |
|        |        |           | artery       | [address] |  [place], |
|        |        |           | disease      |  St  [place]   |  [place]           |
|        |        |           | involving    | [place]    | [address]    |
|        |        |           | native       | [address]        | Phone:        |
|        |        |           | coronary     | Phone:       | [phone]  |
|        |        |           | artery of    | [phone] |  Fax:         |
|        |        |           | native heart |   Fax:       | [phone]  |
|        |        |           |  without     | [phone] |               |
|        |        |           | angina       |              |               |
|        |        |           | pectoris     |              |               |
|        |        |           | Hypertension |              |               |
|        |        |           | , essential  |              |               |
|        |        |           |  Mixed       |              |               |
|        |        |           | hyperlipidem |              |               |
|        |        |           | ia  Aortic   |              |               |
|        |        |           | valve        |              |               |
|        |        |           | insufficienc |              |               |
|        |        |           | y,           |              |               |
|        |        |           | unspecified  |              |               |
|        |        |           | etiology     |              |               |
|        |        |           | Pulmonary    |              |               |
|        |        |           | hypertension |              |               |
|        |        |           |  (HCC)       |              |               |
|        |        |           | Procedures   |              |               |
|        |        |           | ECHO         |              |               |
|        |        |           | Complete  OR |              |               |
|        |        |           |  ECHO HEART  |              |               |
|        |        |           | XTHORACIC,CO |              |               |
|        |        |           | MPLETE W     |              |               |
|        |        |           | DOPPLER  OR  |              |               |
|        |        |           | ECHO HEART   |              |               |
|        |        |           | XTHOSTACEY,CO |              |               |
|        |        |           | MPLETE, W/O  |              |               |
|        |        |           | DOPPLER      |              |               |
+--------+--------+-----------+--------------+--------------+---------------+
 
 
 
 
 Encounter Details
 
 
+--------+-----------+----------------------+----------------------+----------------------+
| Date   | Type      | Department           | Care Team            | Description          |
+--------+-----------+----------------------+----------------------+----------------------+
| / | Hospital  |   [facility] |   Tonia Wilson,    | Coronary artery      |
| 2018   | Encounter |  [facility]  [address] ARNP  [address]   | disease involving    |
|        |           |  [address]  [place]       | St  [place]  | native coronary      |
|        |           | [place] [address] | [address]  [phone]  | artery of native     |
|        |           |   [phone]       |  [phone] (Fax)  | heart without angina |
|        |           |                      |                      |  pectoris;           |
 
|        |           |                      |                      | Hypertension,        |
|        |           |                      |                      | essential; Mixed     |
|        |           |                      |                      | hyperlipidemia;      |
|        |           |                      |                      | Aortic valve         |
|        |           |                      |                      | regurgitation;       |
|        |           |                      |                      | Pulmonary            |
|        |           |                      |                      | hypertension (HCC);  |
|        |           |                      |                      | PVC (premature       |
|        |           |                      |                      | ventricular          |
|        |           |                      |                      | contraction);        |
|        |           |                      |                      | Hypothyroidism,      |
|        |           |                      |                      | unspecified type;    |
|        |           |                      |                      | Vitamin D deficiency |
+--------+-----------+----------------------+----------------------+----------------------+
 
 
 
 Social History
 
 
+--------------+-------+-----------+--------+------+
| Tobacco Use  | Types | Packs/Day | Years  | Date |
|              |       |           | Used   |      |
+--------------+-------+-----------+--------+------+
| Never Smoker |       |           |        |      |
+--------------+-------+-----------+--------+------+
 
 
 
+---------------------+---+---+---+
| Smokeless Tobacco:  |   |   |   |
| Never Used          |   |   |   |
+---------------------+---+---+---+
 
 
 
+---------------------------------------------------------------+
| Comments: some second hand smoke exposure, but fairly minimal |
+---------------------------------------------------------------+
 
 
 
+-------------+-------------+---------+----------+
| Alcohol Use | Drinks/Week | oz/Week | Comments |
+-------------+-------------+---------+----------+
| No          |             |         |          |
+-------------+-------------+---------+----------+
 
 
 
+------------------+---------------+
| Sex Assigned at  | Date Recorded |
| Birth            |               |
+------------------+---------------+
| Not on file      |               |
+------------------+---------------+
 
 
 
+----------------+-------------+-------------+
 
| Job Start Date | Occupation  | Industry    |
+----------------+-------------+-------------+
| Not on file    | Not on file | Not on file |
+----------------+-------------+-------------+
 
 
 
+----------------+--------------+------------+
| Travel History | Travel Start | Travel End |
+----------------+--------------+------------+
 
 
 
+-------------------------------------+
| No recent travel history available. |
+-------------------------------------+
 documented as of this encounter
 
 Medications at Time of Discharge
 
 
+----------------------+----------------------+-----------+---------+----------+-----------+
| Medication           | Sig                  | Dispensed | Refills | Start    | End Date  |
|                      |                      |           |         | Date     |           |
+----------------------+----------------------+-----------+---------+----------+-----------+
|   allopurinol        | take 1 tablet by     |   30      | 11      | 20 |           |
| (ZYLOPRIM) 100 mg    | mouth once daily     | tablet    |         | 18       |           |
| tablet               |                      |           |         |          |           |
+----------------------+----------------------+-----------+---------+----------+-----------+
|   aspirin 81 mg EC   | Take 81 mg by mouth  |           | 0       |          |           |
| tablet               | Daily.               |           |         |          |           |
+----------------------+----------------------+-----------+---------+----------+-----------+
|   cholecalciferol    | Take 2,000 Units by  |           | 0       |          |           |
| (VITAMIN D-3) 2000   | mouth Every other    |           |         |          |           |
| UNITS                | day.                 |           |         |          |           |
| TABSIndications:     |                      |           |         |          |           |
| Unspecified          |                      |           |         |          |           |
| hypertensive kidney  |                      |           |         |          |           |
| disease with chronic |                      |           |         |          |           |
|  kidney disease      |                      |           |         |          |           |
| stage I through      |                      |           |         |          |           |
| stage IV, or         |                      |           |         |          |           |
| unspecified(403.90), |                      |           |         |          |           |
|  FSGS (focal         |                      |           |         |          |           |
| segmental            |                      |           |         |          |           |
| glomerulosclerosis), |                      |           |         |          |           |
|  Hyperlipidemia,     |                      |           |         |          |           |
| Complications of     |                      |           |         |          |           |
| transplanted kidney  |                      |           |         |          |           |
+----------------------+----------------------+-----------+---------+----------+-----------+
|   glucose blood VI   | Check blood sugar    |   100     | 12      | 20 |           |
| test strips (ONE     | before each meal and | each      |         | 12       |           |
| TOUCH ULTRA TEST)    |  as directed         |           |         |          |           |
| stripIndications:    |                      |           |         |          |           |
| Unspecified          |                      |           |         |          |           |
| hypertensive kidney  |                      |           |         |          |           |
| disease with chronic |                      |           |         |          |           |
|  kidney disease      |                      |           |         |          |           |
| stage I through      |                      |           |         |          |           |
| stage IV, or         |                      |           |         |          |           |
 
| unspecified(403.90), |                      |           |         |          |           |
|  Complications of    |                      |           |         |          |           |
| transplanted kidney, |                      |           |         |          |           |
|  Diabetes mellitus   |                      |           |         |          |           |
| type II,             |                      |           |         |          |           |
| uncontrolled (HCC),  |                      |           |         |          |           |
| Hyperlipidemia       |                      |           |         |          |           |
+----------------------+----------------------+-----------+---------+----------+-----------+
|   insulin lispro     | inject               |   10 vial | 11      | 11/15/20 |           |
| (HUMALOG) 100        | subcutaneously       |           |         | 17       |           |
| units/mL injection   | BEFORE MEALS         |           |         |          |           |
| (vial)Indications:   | ACCORDING TO SLIDING |           |         |          |           |
| Type 2 diabetes      |  SCALE Max dose 20   |           |         |          |           |
| mellitus with        | units daily          |           |         |          |           |
| complication, with   |                      |           |         |          |           |
| long-term current    |                      |           |         |          |           |
| use of insulin ([facility]) |                      |           |         |          |           |
+----------------------+----------------------+-----------+---------+----------+-----------+
|   lisinopril         | take 1 tablet by     |   90      | 3       | 20 |           |
| (PRINIVIL,ZESTRIL)   | mouth once daily     | tablet    |         | 17       |           |
| 30 MG tablet         |                      |           |         |          |           |
+----------------------+----------------------+-----------+---------+----------+-----------+
|   loperamide         | Take 1 capsule by    |   60      | 5       | 20 |           |
| (ANTI-DIARRHEAL) 2   | mouth 4 times daily  | capsule   |         | 14       |           |
| mg                   | as needed.           |           |         |          |           |
| capsuleIndications:  |                      |           |         |          |           |
| Unspecified          |                      |           |         |          |           |
| hypertensive kidney  |                      |           |         |          |           |
| disease with chronic |                      |           |         |          |           |
|  kidney disease      |                      |           |         |          |           |
| stage I through      |                      |           |         |          |           |
| stage IV, or         |                      |           |         |          |           |
| unspecified(403.90), |                      |           |         |          |           |
|  FSGS (focal         |                      |           |         |          |           |
| segmental            |                      |           |         |          |           |
| glomerulosclerosis), |                      |           |         |          |           |
|  Hypothyroidism,     |                      |           |         |          |           |
| Hyperlipidemia       |                      |           |         |          |           |
+----------------------+----------------------+-----------+---------+----------+-----------+
|   losartan (COZAAR)  | take 1 tablet by     |   90      | 3       | 20 |           |
| 50 mg tablet         | mouth once daily     | tablet    |         | 18       |           |
+----------------------+----------------------+-----------+---------+----------+-----------+
|   Prenatal Vit-Fe    | take 1 tablet by     |   30      | 11      | 20 |           |
| Fumarate-FA (PNV     | mouth once daily.    | tablet    |         | 18       |           |
| PRENATAL PLUS        |                      |           |         |          |           |
| MULTIVITAMIN) 27-1   |                      |           |         |          |           |
| MG TABS              |                      |           |         |          |           |
+----------------------+----------------------+-----------+---------+----------+-----------+
|   Respiratory        | Continue nocturnal   |   1 each  | 0       | 20 |           |
| Therapy Supplies     | O2 at 2 l/m through  |           |         | 18       |           |
| MISCIndications: JACK | CPAP for lifetime.   |           |         |          |           |
|  (obstructive sleep  | Dx: JACK G47.33       |           |         |          |           |
| apnea)               | Please provide new   |           |         |          |           |
|                      | nasal mask for CPAP  |           |         |          |           |
|                      | and any other needed |           |         |          |           |
|                      |  replacement         |           |         |          |           |
|                      | supplies.            |           |         |          |           |
+----------------------+----------------------+-----------+---------+----------+-----------+
|   Respiratory        | Decrease CPAP to     |   1 each  | 0       | 20 |           |
| Therapy Supplies     | 12-18 cmH2O          |           |         | 13       |           |
 
| MISC                 | Diagnosis            |           |         |          |           |
|                      | Code(s)327.23.       |           |         |          |           |
|                      | Please send order to |           |         |          |           |
|                      |  [facility].     |           |         |          |           |
+----------------------+----------------------+-----------+---------+----------+-----------+
|   B-D INS SYRINGE    | use as directed      |   100     | 11      | 20 |  |
| 0.5CC/31GX5/16 31G X | BEFORE MEALS         | each      |         | 17       | 8         |
|  " 0.5 ML MISC   |                      |           |         |          |           |
+----------------------+----------------------+-----------+---------+----------+-----------+
|   fludrocortisone    | take 1 tablet by     |   90      | 4       | 20 |  |
| (FLORINEF) 0.1 mg    | mouth every other    | tablet    |         | 18       | 9         |
| tablet               | day                  |           |         |          |           |
+----------------------+----------------------+-----------+---------+----------+-----------+
|   furosemide (LASIX) | Take 1 tablet by     |   30      | 5       | 20 |  |
|  40 mg               | mouth Daily as       | tablet    |         | 15       | 8         |
| tabletIndications:   | needed.              |           |         |          |           |
| Unspecified          |                      |           |         |          |           |
| hypertensive kidney  |                      |           |         |          |           |
| disease with chronic |                      |           |         |          |           |
|  kidney disease      |                      |           |         |          |           |
| stage I through      |                      |           |         |          |           |
| stage IV, or         |                      |           |         |          |           |
| unspecified(403.90), |                      |           |         |          |           |
|  FSGS (focal         |                      |           |         |          |           |
| segmental            |                      |           |         |          |           |
| glomerulosclerosis), |                      |           |         |          |           |
|  Hyperlipidemia      |                      |           |         |          |           |
+----------------------+----------------------+-----------+---------+----------+-----------+
|   LANTUS 100 UNIT/ML | inject 20 units      |   10 vial | 11      | 10/27/20 |  |
|  injection (vial)    | subcutaneously every |           |         | 17       | 8         |
|                      |  morning             |           |         |          |           |
+----------------------+----------------------+-----------+---------+----------+-----------+
|   levothyroxine      | take 1 tablet by     |   30      | 11      | 20 |  |
| (SYNTHROID) 50 mcg   | mouth once daily     | tablet    |         | 17       | 8         |
| tablet               |                      |           |         |          |           |
+----------------------+----------------------+-----------+---------+----------+-----------+
|   magnesium oxide    | take 1 tablet by     |   60      | 11      | 20 | 10/02/201 |
| (MAG-OX) 400 mg      | mouth twice a day    | tablet    |         | 17       | 8         |
| tablet               |                      |           |         |          |           |
+----------------------+----------------------+-----------+---------+----------+-----------+
|   metoprolol         | take 1 tablet by     |   60      | 11      | 20 |  |
| tartrate (LOPRESSOR) | mouth twice a day    | tablet    |         | 18       | 9         |
|  25 mg tablet        |                      |           |         |          |           |
+----------------------+----------------------+-----------+---------+----------+-----------+
|   mycophenolate      | Take 250 mg by mouth |           | 0       |          |  |
| (CELLCEPT) 250 mg    |  2 times daily.      |           |         |          | 8         |
| capsule              |                      |           |         |          |           |
+----------------------+----------------------+-----------+---------+----------+-----------+
|   omeprazole         | Take 20 mg by mouth  |           | 0       |          |  |
| (PRILOSEC) 20 mg     | every morning        |           |         |          | 9         |
| capsule              | (before breakfast).  |           |         |          |           |
+----------------------+----------------------+-----------+---------+----------+-----------+
|   predniSONE         | Take 5 mg by mouth   |           | 0       |          | 10/02/201 |
| (DELTASONE) 5 mg     | Daily.               |           |         |          | 8         |
| tablet               |                      |           |         |          |           |
+----------------------+----------------------+-----------+---------+----------+-----------+
|   QVAR REDIHALER 80  |                      |           | 0       | 20 |  |
| MCG/ACT inhaler      |                      |           |         | 18       | 9         |
+----------------------+----------------------+-----------+---------+----------+-----------+
|   rosuvastatin       | take 1 tablet by     |   30      | 11      | 20 |  |
 
| (CRESTOR) 20 mg      | mouth NIGHTLY        | tablet    |         | 17       | 8         |
| tablet               |                      |           |         |          |           |
+----------------------+----------------------+-----------+---------+----------+-----------+
|   SENSIPAR 30 MG     | take 1 tablet by     |   30      | 11      | 20 |  |
| tablet               | mouth once daily     | tablet    |         | 18       | 9         |
+----------------------+----------------------+-----------+---------+----------+-----------+
|   tacrolimus         | Take 1 capsule by    |   30      | 11      | 20 | 11/15/201 |
| (PROGRAF) 0.5 mg     | mouth every morning. | capsule   |         | 18       | 8         |
| capsuleIndications:  |  For a total of 1.5  |           |         |          |           |
| Kidney replaced by   | mg, A.M., and 1 mg,  |           |         |          |           |
| transplant           | PM.                  |           |         |          |           |
+----------------------+----------------------+-----------+---------+----------+-----------+
|   tacrolimus         | Take 1 capsule by    |   60      | 11      | 20 | 11/15/201 |
| (PROGRAF) 1 mg       | mouth 2 times daily. | capsule   |         | 18       | 8         |
| capsuleIndications:  |  For a total of 1.5  |           |         |          |           |
| Kidney replaced by   | mg, A.M., and 1 mg,  |           |         |          |           |
| transplant           | PM.                  |           |         |          |           |
+----------------------+----------------------+-----------+---------+----------+-----------+
 documented as of this encounter
 
 Plan of Treatment
 
 
+--------+-----------+------------+----------------------+-------------------+
| Date   | Type      | Specialty  | Care Team            | Description       |
+--------+-----------+------------+----------------------+-------------------+
| / | Office    | Cardiology |   Tonia Wilson,    |                   |
|    | Visit     |            | ARNP  [address]   |                   |
|        |           |            | St  [place], [place]  |                   |
|        |           |            | [address]  [phone]  |                   |
|        |           |            |  [phone] (Fax)  |                   |
+--------+-----------+------------+----------------------+-------------------+
| / | Hospital  | Radiology  |   Popeye Townsend, |                   |
|    | Encounter |            |  MD  [address] |                   |
|        |           |            |  St.  [place],   |                   |
|        |           |            | [place] [address]             |                   |
|        |           |            | [phone]         |                   |
|        |           |            | [phone] (Fax)   |                   |
+--------+-----------+------------+----------------------+-------------------+
| / | Surgery   | Radiology  |   Popeye Townsend, | CV EP PPM SYSTEM  |
|    |           |            |  MD  [address] | IMPLANT           |
|        |           |            |  St.  [place],   |                   |
|        |           |            | [place] [address]             |                   |
|        |           |            | [phone]         |                   |
|        |           |            | [phone] (Fax)   |                   |
+--------+-----------+------------+----------------------+-------------------+
| 02/10/ | Clinical  | Cardiology |                      |                   |
|    | Support   |            |                      |                   |
+--------+-----------+------------+----------------------+-------------------+
| / | Office    | Cardiology |   Tonia Wilson,    |                   |
|    | Visit     |            | ARNP  [address]   |                   |
|        |           |            | [place]  |                   |
|        |           |            | [address]  [phone]  |                   |
|        |           |            |  [phone] (Fax)  |                   |
+--------+-----------+------------+----------------------+-------------------+
| / | Off-Site  | Nephrology |   Marzena Moser  |                   |
|    | Visit     |            | M, DO  [address]      |                   |
|        |           |            | [address]      |                   |
|        |           |            | [place]      |                   |
|        |           |            | [address]  [phone]  |                   |
 
|        |           |            |  [phone] (Fax)  |                   |
+--------+-----------+------------+----------------------+-------------------+
 documented as of this encounter
 
 Procedures
 
 
+--------------------+--------+-------------+----------------------+----------------------+
| Procedure Name     | Priori | Date/Time   | Associated Diagnosis | Comments             |
|                    | ty     |             |                      |                      |
+--------------------+--------+-------------+----------------------+----------------------+
| VITAMIN D,         | Routin | 2018  |   Coronary artery    |   Results for this   |
| DEFICIENCY SCREEN  | e      |  4:00 PM    | disease involving    | procedure are in the |
| (25-HYDROXY)       |        | PDT         | native coronary      |  results section.    |
|                    |        |             | artery of native     |                      |
|                    |        |             | heart without angina |                      |
|                    |        |             |  pectoris            |                      |
|                    |        |             | Hypertension,        |                      |
|                    |        |             | essential  Mixed     |                      |
|                    |        |             | hyperlipidemia  PVC  |                      |
|                    |        |             | (premature           |                      |
|                    |        |             | ventricular          |                      |
|                    |        |             | contraction)         |                      |
|                    |        |             | Hypothyroidism,      |                      |
|                    |        |             | unspecified type     |                      |
|                    |        |             | Vitamin D deficiency |                      |
+--------------------+--------+-------------+----------------------+----------------------+
| TSH                | Routin | 2018  |   Coronary artery    |   Results for this   |
|                    | e      |  4:00 PM    | disease involving    | procedure are in the |
|                    |        | PDT         | native coronary      |  results section.    |
|                    |        |             | artery of native     |                      |
|                    |        |             | heart without angina |                      |
|                    |        |             |  pectoris            |                      |
|                    |        |             | Hypertension,        |                      |
|                    |        |             | essential  Mixed     |                      |
|                    |        |             | hyperlipidemia  PVC  |                      |
|                    |        |             | (premature           |                      |
|                    |        |             | ventricular          |                      |
|                    |        |             | contraction)         |                      |
|                    |        |             | Hypothyroidism,      |                      |
|                    |        |             | unspecified type     |                      |
|                    |        |             | Vitamin D deficiency |                      |
+--------------------+--------+-------------+----------------------+----------------------+
| T4, FREE           | Routin | 2018  |   PVC (premature     |   Results for this   |
|                    | e      |  4:00 PM    | ventricular          | procedure are in the |
|                    |        | PDT         | contraction)         |  results section.    |
|                    |        |             | Hypothyroidism,      |                      |
|                    |        |             | unspecified type     |                      |
+--------------------+--------+-------------+----------------------+----------------------+
| MAGNESIUM          | Routin | 2018  |   Coronary artery    |   Results for this   |
|                    | e      |  4:00 PM    | disease involving    | procedure are in the |
|                    |        | PDT         | native coronary      |  results section.    |
|                    |        |             | artery of native     |                      |
|                    |        |             | heart without angina |                      |
|                    |        |             |  pectoris            |                      |
|                    |        |             | Hypertension,        |                      |
|                    |        |             | essential  Mixed     |                      |
|                    |        |             | hyperlipidemia  PVC  |                      |
|                    |        |             | (premature           |                      |
|                    |        |             | ventricular          |                      |
 
|                    |        |             | contraction)         |                      |
|                    |        |             | Hypothyroidism,      |                      |
|                    |        |             | unspecified type     |                      |
|                    |        |             | Vitamin D deficiency |                      |
+--------------------+--------+-------------+----------------------+----------------------+
| BASIC METABOLIC    | Routin | 2018  |   Coronary artery    |   Results for this   |
| PANEL              | e      |  4:00 PM    | disease involving    | procedure are in the |
|                    |        | PDT         | native coronary      |  results section.    |
|                    |        |             | artery of native     |                      |
|                    |        |             | heart without angina |                      |
|                    |        |             |  pectoris            |                      |
|                    |        |             | Hypertension,        |                      |
|                    |        |             | essential  Mixed     |                      |
|                    |        |             | hyperlipidemia  PVC  |                      |
|                    |        |             | (premature           |                      |
|                    |        |             | ventricular          |                      |
|                    |        |             | contraction)         |                      |
|                    |        |             | Hypothyroidism,      |                      |
|                    |        |             | unspecified type     |                      |
|                    |        |             | Vitamin D deficiency |                      |
+--------------------+--------+-------------+----------------------+----------------------+
| ECHO COMPLETE      | Routin | 2018  |   Coronary artery    |   Results for this   |
|                    | e      | 12:58 PM    | disease involving    | procedure are in the |
|                    |        | PDT         | native coronary      |  results section.    |
|                    |        |             | artery of native     |                      |
|                    |        |             | heart without angina |                      |
|                    |        |             |  pectoris            |                      |
|                    |        |             | Hypertension,        |                      |
|                    |        |             | essential  Mixed     |                      |
|                    |        |             | hyperlipidemia       |                      |
|                    |        |             | Aortic valve         |                      |
|                    |        |             | regurgitation        |                      |
|                    |        |             | Pulmonary            |                      |
|                    |        |             | hypertension (HCC)   |                      |
+--------------------+--------+-------------+----------------------+----------------------+
 documented in this encounter
 
 Results
 T4, Free (2018  4:00 PM PDT)
 
+-----------+-------+-----------------+-------------+--------------+
| Component | Value | Ref Range       | Performed   | Pathologist  |
|           |       |                 | At          | Signature    |
+-----------+-------+-----------------+-------------+--------------+
| FT4       | 0.9   | 0.6 - 1.1 ng/dL | [facility]  |              |
|           |       |                 | [facility]    |              |
|           |       |                 | [facility]     |              |
|           |       |                 | [facility] -    |              |
|           |       |                 | LABORATORY  |              |
+-----------+-------+-----------------+-------------+--------------+
 
 
 
+----------+
| Specimen |
+----------+
| Blood    |
+----------+
 
 
 
 
+----------------------+--------------------+--------------------+----------------+
| Performing           | Address            | City/State/Zipcode | Phone Number   |
| Organization         |                    |                    |                |
+----------------------+--------------------+--------------------+----------------+
|   [facility]     |   [address] | [place]    |   [phone] |
| [facility]  |                    | [address]              |                |
| - LABORATORY         |                    |                    |                |
+----------------------+--------------------+--------------------+----------------+
 Vitamin D, Deficiency Screen (25-Hydroxy) (2018  4:00 PM PDT)
 
+-------------+-------+---------------+-------------+--------------+
| Component   | Value | Ref Range     | Performed   | Pathologist  |
|             |       |               | At          | Signature    |
+-------------+-------+---------------+-------------+--------------+
| Vitamin D,  | 37    | 30 - 80 ng/mL | [facility]  |              |
| 25 Hydroxy  |       |               | [facility]    |              |
|             |       |               | [facility]     |              |
|             |       |               | CENTER -    |              |
|             |       |               | LABORATORY  |              |
+-------------+-------+---------------+-------------+--------------+
 
 
 
+----------+
| Specimen |
+----------+
| Blood    |
+----------+
 
 
 
+----------------------+--------------------+--------------------+----------------+
| Performing           | Address            | City/State/Zipcode | Phone Number   |
| Organization         |                    |                    |                |
+----------------------+--------------------+--------------------+----------------+
|   [facility]     |   [address] | [place]    |   [phone] |
| [facility]  |                    | [address]              |                |
| - LABORATORY         |                    |                    |                |
+----------------------+--------------------+--------------------+----------------+
 TSH (2018  4:00 PM PDT)
 
+-----------+-------------------------+--------------+-------------+--------------+
| Component | Value                   | Ref Range    | Performed   | Pathologist  |
|           |                         |              | At          | Signature    |
+-----------+-------------------------+--------------+-------------+--------------+
| TSH       | 1.26Comment: This is a  | 0.45 - 5.33  | [facility]  |              |
|           | third generation TSH    | uIU/mL       | [facility]    |              |
|           | test.                   |              | MEDICAL     |              |
|           |                         |              | CENTER -    |              |
|           |                         |              | LABORATORY  |              |
+-----------+-------------------------+--------------+-------------+--------------+
 
 
 
+----------+
| Specimen |
+----------+
| Blood    |
+----------+
 
 
 
 
+----------------------+--------------------+--------------------+----------------+
| Performing           | Address            | City/State/Zipcode | Phone Number   |
| Organization         |                    |                    |                |
+----------------------+--------------------+--------------------+----------------+
|   [facility]     |   [address] | [place]    |   [phone] |
| [facility]  |                    | [address]              |                |
| - LABORATORY         |                    |                    |                |
+----------------------+--------------------+--------------------+----------------+
 Magnesium (2018  4:00 PM PDT)
 
+-----------+---------+-----------------+-------------+--------------+
| Component | Value   | Ref Range       | Performed   | Pathologist  |
|           |         |                 | At          | Signature    |
+-----------+---------+-----------------+-------------+--------------+
| Magnesium | 1.7 (L) | 1.8 - 2.5 mg/dL | [facility]  |              |
|           |         |                 | [facility]    |              |
|           |         |                 | MEDICAL     |              |
|           |         |                 | CENTER -    |              |
|           |         |                 | LABORATORY  |              |
+-----------+---------+-----------------+-------------+--------------+
 
 
 
+----------+
| Specimen |
+----------+
| Blood    |
+----------+
 
 
 
+----------------------+--------------------+--------------------+----------------+
| Performing           | Address            | City/State/Zipcode | Phone Number   |
| Organization         |                    |                    |                |
+----------------------+--------------------+--------------------+----------------+
|   [facility]     |   [address] | [place]    |   [phone] |
| [facility]  |                    | [address]              |                |
| - LABORATORY         |                    |                    |                |
+----------------------+--------------------+--------------------+----------------+
 Basic Metabolic Panel (2018  4:00 PM PDT)
 
+-------------+--------------------------+-----------------+-------------+--------------+
| Component   | Value                    | Ref Range       | Performed   | Pathologist  |
|             |                          |                 | At          | Signature    |
+-------------+--------------------------+-----------------+-------------+--------------+
| Na          | 140                      | 136 - 149       | [facility]  |              |
|             |                          | mmol/L          | [facility]    |              |
|             |                          |                 | MEDICAL     |              |
|             |                          |                 | CENTER -    |              |
|             |                          |                 | LABORATORY  |              |
+-------------+--------------------------+-----------------+-------------+--------------+
| K           | 5.3 (H)                  | 3.5 - 5.1       | [facility]  |              |
|             |                          | mmol/L          | [facility]    |              |
|             |                          |                 | MEDICAL     |              |
|             |                          |                 | CENTER -    |              |
|             |                          |                 | LABORATORY  |              |
+-------------+--------------------------+-----------------+-------------+--------------+
 
| Cl          | 110 (H)                  | 98 - 109 mmol/L | [facility]  |              |
|             |                          |                 | [facility]    |              |
|             |                          |                 | [facility]     |              |
|             |                          |                 | [facility] -    |              |
|             |                          |                 | LABORATORY  |              |
+-------------+--------------------------+-----------------+-------------+--------------+
| CO2         | 21 (L)                   | 24 - 31 mmol/L  | [facility]  |              |
|             |                          |                 | [facility]    |              |
|             |                          |                 | [facility]     |              |
|             |                          |                 | CENTER -    |              |
|             |                          |                 | LABORATORY  |              |
+-------------+--------------------------+-----------------+-------------+--------------+
| Anion Gap   | 9                        | 3 - 16 mmol/L   | [facility]  |              |
|             |                          |                 | [facility]    |              |
|             |                          |                 | [facility]     |              |
|             |                          |                 | CENTER -    |              |
|             |                          |                 | LABORATORY  |              |
+-------------+--------------------------+-----------------+-------------+--------------+
| Glucose     | 161 (H)                  | 70 - 109 mg/dL  | [facility]  |              |
|             |                          |                 | [facility]    |              |
|             |                          |                 | [facility]     |              |
|             |                          |                 | CENTER -    |              |
|             |                          |                 | LABORATORY  |              |
+-------------+--------------------------+-----------------+-------------+--------------+
| BUN         | 43 (H)                   | 7 - 18 mg/dL    | [facility]  |              |
|             |                          |                 | [facility]    |              |
|             |                          |                 | [facility]     |              |
|             |                          |                 | CENTER -    |              |
|             |                          |                 | LABORATORY  |              |
+-------------+--------------------------+-----------------+-------------+--------------+
| Creatinine  | 1.48 (H)                 | 0.60 - 1.30     | [facility]  |              |
|             |                          | mg/dL           | [facility]    |              |
|             |                          |                 | [facility]     |              |
|             |                          |                 | CENTER -    |              |
|             |                          |                 | LABORATORY  |              |
+-------------+--------------------------+-----------------+-------------+--------------+
| eGFR if not | 35 (L)Comment:           | >=60            | [facility]  |              |
|      | GLOMERULAR FILTRATION    | mL/min/1.73m2   | [facility]    |              |
| [employer]    | RATE,ESTIMATED           |                 | MEDICAL     |              |
|             |   mL/min/1.46j6Gavt than |                 | CENTER -    |              |
|             |  60    Chronic kidney    |                 | LABORATORY  |              |
|             | disease,if found over a  |                 |             |              |
|             | 3-month period.Less than |                 |             |              |
|             |  15    Kidney failureFor |                 |             |              |
|             |                   |                 |             |              |
|             | Americans,multiply the   |                 |             |              |
|             | calculated GFR by 1.21.  |                 |             |              |
|             |                          |                 |             |              |
+-------------+--------------------------+-----------------+-------------+--------------+
| Calcium     | 9.8                      | 8.3 - 10.5      | [facility]  |              |
|             |                          | mg/dL           | [facility]    |              |
|             |                          |                 | MED[facility]     |              |
|             |                          |                 | CENTER -    |              |
|             |                          |                 | LABORATORY  |              |
+-------------+--------------------------+-----------------+-------------+--------------+
| BUN/Creatin | 29.1                     |                 | [facility]  |              |
| ine Ratio   |                          |                 | [facility]    |              |
|             |                          |                 | [facility]     |              |
|             |                          |                 | CENTER -    |              |
|             |                          |                 | LABORATORY  |              |
 
+-------------+--------------------------+-----------------+-------------+--------------+
 
 
 
+----------+
| Specimen |
+----------+
| Blood    |
+----------+
 
 
 
+----------------------+--------------------+--------------------+----------------+
| Performing           | Address            | City/State/Zipcode | Phone Number   |
| Organization         |                    |                    |                |
+----------------------+--------------------+--------------------+----------------+
|   [facility]     |   [address] | [place]    |   [phone] |
| [facility]  |                    | [id number]              |                |
| - LABORATORY         |                    |                    |                |
+----------------------+--------------------+--------------------+----------------+
 ECHO Complete (2018 12:58 PM PDT)
 
+-------------+--------+-----------+-------------+--------------+
| Component   | Value  | Ref Range | Performed   | Pathologist  |
|             |        |           | At          | Signature    |
+-------------+--------+-----------+-------------+--------------+
| LVEF-TTE    | 60     | %         | [facility] |              |
| TRANSTHORAC |        |           |             |              |
| IC ECHO     |        |           |             |              |
+-------------+--------+-----------+-------------+--------------+
| Patient     | 270lb  |           | [facility] |              |
| Weight      |        |           |             |              |
| (lbs)       |        |           |             |              |
+-------------+--------+-----------+-------------+--------------+
| Patient     | 5f6in  |           | [facility] |              |
| Height      |        |           |             |              |
+-------------+--------+-----------+-------------+--------------+
| LVIDd       | 5.01   | cm        | [facility] |              |
+-------------+--------+-----------+-------------+--------------+
| FS          | 34     | %         | [facility] |              |
+-------------+--------+-----------+-------------+--------------+
| LA volume   | 106.4  | mL        | [facility] |              |
+-------------+--------+-----------+-------------+--------------+
| MV Area by  | 3.27   | cm2       | [facility] |              |
| P 1/2       |        |           |             |              |
| method      |        |           |             |              |
+-------------+--------+-----------+-------------+--------------+
| IVRT        | 131.49 | msec      | [facility] |              |
+-------------+--------+-----------+-------------+--------------+
| LVOT        | 2.38   | cm        | [facility] |              |
| diameter    |        |           |             |              |
+-------------+--------+-----------+-------------+--------------+
| LVOT peak   | 68.43  | cm/s      | [facility] |              |
| travon         |        |           |             |              |
+-------------+--------+-----------+-------------+--------------+
| AV peak travon | 149.09 | cm/s      | [facility] |              |
+-------------+--------+-----------+-------------+--------------+
| AV peak     | 8.89   | mmHg      | [facility] |              |
| gradient    |        |           |             |              |
+-------------+--------+-----------+-------------+--------------+
 
| MV peak     | 2.62   | mmHg      | [facility] |              |
| gradient    |        |           |             |              |
+-------------+--------+-----------+-------------+--------------+
| MV Pressure | 67.22  | msec      | [facility] |              |
|  1/2 time   |        |           |             |              |
+-------------+--------+-----------+-------------+--------------+
| LA Volume   | 47     | mL/m2     | [facility] |              |
| Index       |        |           |             |              |
+-------------+--------+-----------+-------------+--------------+
| AV LVOT     | 1.87   | mmHg      | [facility] |              |
| Peak        |        |           |             |              |
| Gradient    |        |           |             |              |
+-------------+--------+-----------+-------------+--------------+
| LV          | 7.01   | cm        | [facility] |              |
| Diastolic   |        |           |             |              |
| Length 4C   |        |           |             |              |
+-------------+--------+-----------+-------------+--------------+
| LV          | 60     | %         | [facility] |              |
| Moran's   |        |           |             |              |
| Biplane EF  |        |           |             |              |
+-------------+--------+-----------+-------------+--------------+
| LV ED       | 86.24  | ml        | [facility] |              |
| Volume      |        |           |             |              |
| (Moran's) |        |           |             |              |
+-------------+--------+-----------+-------------+--------------+
| LV ED       | 38     | ml/m2     | [facility] |              |
| Volume      |        |           |             |              |
| Index       |        |           |             |              |
+-------------+--------+-----------+-------------+--------------+
| LV ES       | 37.52  | ml        | [facility] |              |
| Volume      |        |           |             |              |
+-------------+--------+-----------+-------------+--------------+
| MV E'       | 5      | cm/s      | [facility] |              |
| Septal      |        |           |             |              |
| Velocity    |        |           |             |              |
+-------------+--------+-----------+-------------+--------------+
| MV          | 349.23 | cm/s2     | [facility] |              |
| Deceleratio |        |           |             |              |
| n [place]     |        |           |             |              |
+-------------+--------+-----------+-------------+--------------+
| MV          | 231.78 | msec      | [facility] |              |
| Deceleratio |        |           |             |              |
| n Time      |        |           |             |              |
+-------------+--------+-----------+-------------+--------------+
| MV E/A      | 1.24   |           | [facility] |              |
| Ratio       |        |           |             |              |
+-------------+--------+-----------+-------------+--------------+
| MV Peak     | 65.42  | cm/s      | [facility] |              |
| A-Wave      |        |           |             |              |
+-------------+--------+-----------+-------------+--------------+
| MV Peak     | 80.95  | cm/s      | [facility] |              |
| E-Wave      |        |           |             |              |
+-------------+--------+-----------+-------------+--------------+
| LA/Aorta    | 1.04   |           | [facility] |              |
| Ratio       |        |           |             |              |
+-------------+--------+-----------+-------------+--------------+
| MV E/E      | 16.19  |           | [facility] |              |
| SEPTAL      |        |           |             |              |
+-------------+--------+-----------+-------------+--------------+
| LV ES       | 17     | ml/m2     | [facility] |              |
 
| Volume      |        |           |             |              |
| Index       |        |           |             |              |
+-------------+--------+-----------+-------------+--------------+
| Heart Rate  | 62     |           | [facility] |              |
+-------------+--------+-----------+-------------+--------------+
| Aortic Root | 3.9    | cm        | [facility] |              |
|  Diameter   |        |           |             |              |
+-------------+--------+-----------+-------------+--------------+
| IVS         | 1.26   | cm        | [facility] |              |
| Diastolic   |        |           |             |              |
| Thickness   |        |           |             |              |
| MM          |        |           |             |              |
+-------------+--------+-----------+-------------+--------------+
| LVPW        | 1.34   | cm        | [facility] |              |
| Diastolic   |        |           |             |              |
| Thickness   |        |           |             |              |
| MM          |        |           |             |              |
+-------------+--------+-----------+-------------+--------------+
| LV Systolic | 3.31   | cm        | [facility] |              |
|  Diameter   |        |           |             |              |
| MM          |        |           |             |              |
+-------------+--------+-----------+-------------+--------------+
| AV Cusp     | 1.13   | cm        | [facility] |              |
| Seperation  |        |           |             |              |
| MM          |        |           |             |              |
+-------------+--------+-----------+-------------+--------------+
| LA Systolic | 4.06   | cm        | [facility] |              |
|  Diameter   |        |           |             |              |
| MM          |        |           |             |              |
+-------------+--------+-----------+-------------+--------------+
| TAPSE       | 1.8    | cm        | [facility] |              |
+-------------+--------+-----------+-------------+--------------+
| RA PRESSURE | 3      | mmHg      | [facility] |              |
+-------------+--------+-----------+-------------+--------------+
 
 
 
+----------+
| Specimen |
+----------+
|          |
+----------+
 
 
 
+---------------------------------------------------------------------------+---------------
+
| Narrative                                                                 | Performed At  
|
+---------------------------------------------------------------------------+---------------
+
|   This result has an attachment that is not available.  1.   Moderate     |   [facility] 
|
| left atrial dilatation.2.   Normal left ventricular size with a mild      |               
|
| concentric left ventricular hypertrophy.   Left ventricular systolic      |               
|
| dysfunction is preserved.   LVEF is 60-65%.3.   Mild aortic root          |               
|
| dilatation measuring 3.9 cm in diameter.4.   Mildly thickened and         |               
 
|
| calcified trileaflet aortic valve with adequate opening.   There is       |               
|
| aortic valve sclerosis without significant aortic valve stenosis.5.       |               
|
|   Mildly thickened and calcified mitral valve with a mild mitral valve    |               
|
|  regurgitation.6.   Mild mitral annular calcification.   7.   Normal      |               
|
| right-sided pressure.8.   Normal IVC with normal respiratory              |               
|
| collapse.9.   When compared to echocardiography on 4/15/14, no            |               
|
| significant changes.                                                      |               
|
|8.   Normal IVC with normal respiratory collapse.                          |               
|
|9.   When compared to echocardiography on 4/15/14, no significant changes. |               
|
+---------------------------------------------------------------------------+---------------
+
 
 
 
+---------------+---------+--------------------+--------------+
| Performing    | Address | City/State/Zipcode | Phone Number |
| Organization  |         |                    |              |
+---------------+---------+--------------------+--------------+
|   [facility] |         |                    |              |
+---------------+---------+--------------------+--------------+
 documented in this encounter
 
 Visit Diagnoses
 
 
+-------------------------------------------------------------------------------------+
| Diagnosis                                                                           |
+-------------------------------------------------------------------------------------+
|   Coronary artery disease involving native coronary artery of native heart without  |
| angina pectoris                                                                     |
+-------------------------------------------------------------------------------------+
|   Hypertension, essential  Unspecified essential hypertension                       |
+-------------------------------------------------------------------------------------+
|   Mixed hyperlipidemia                                                              |
+-------------------------------------------------------------------------------------+
|   Aortic valve regurgitation  Aortic valve disorders                                |
+-------------------------------------------------------------------------------------+
|   Pulmonary hypertension (HCC)  Other chronic pulmonary heart diseases              |
+-------------------------------------------------------------------------------------+
|   PVC (premature ventricular contraction)  Other premature beats                    |
+-------------------------------------------------------------------------------------+
|   Hypothyroidism, unspecified type                                                  |
+-------------------------------------------------------------------------------------+
|   Vitamin D deficiency  Unspecified vitamin D deficiency                            |
+-------------------------------------------------------------------------------------+
 documented in this encounter

## 2020-01-08 NOTE — XMS
Encounter Summary
  Created on: 2020
 
 Viviana Ricci
 External Reference #: [id number]
 : 47
 Sex: Female
 
 Demographics
 
 
+-----------------------+----------------------+
| Address               | [address]      |
|                       | [place]  [address] |
+-----------------------+----------------------+
| Home Phone            | [phone]      |
+-----------------------+----------------------+
| Preferred Language    | Unknown              |
+-----------------------+----------------------+
| Marital Status        | Single               |
+-----------------------+----------------------+
| Alevism Affiliation | [id number]                 |
+-----------------------+----------------------+
| Race                  | Unknown              |
+-----------------------+----------------------+
| Ethnic Group          | Unknown              |
+-----------------------+----------------------+
 
 
 Author
 
 
+--------------+--------------------------------------------+
| Author       | [facility]  |
|              | and [facility]                                |
+--------------+--------------------------------------------+
| Organization | [facility]  |
|              | and [facility]                                |
+--------------+--------------------------------------------+
| Address      | Unknown                                    |
+--------------+--------------------------------------------+
| Phone        | Unavailable                                |
+--------------+--------------------------------------------+
 
 
 
 Support
 
 
+----------------+--------------+---------------------+-----------------+
| Name           | Relationship | Address             | Phone           |
+----------------+--------------+---------------------+-----------------+
| Ada/Ed Radhames | ECON         | [address]              | [phone] |
|                |              | [place], [place]  |                 |
|                |              |  [id number]              |                 |
+----------------+--------------+---------------------+-----------------+
 
 
 
 
 Care Team Providers
 
 
+-----------------------+------+-------------+
| Care Team Member Name | Role | Phone       |
+-----------------------+------+-------------+
 PCP  | Unavailable |
+-----------------------+------+-------------+
 
 
 
 Encounter Details
 
 
+--------+-----------+----------------------+----------------+-------------+
| Date   | Type      | Department           | Care Team      | Description |
+--------+-----------+----------------------+----------------+-------------+
| / | [facility]  |   [facility] |   Lucretia,  |             |
|    | Encounter |  MED CTR GENERIC OP  | Nena GUSMAN MD  |             |
|        |           | CONV DEPT  [address]     |                |             |
|        |           | [place], |                |             |
|        |           |  [place] [address]       |                |             |
|        |           | [phone]         |                |             |
+--------+-----------+----------------------+----------------+-------------+
 
 
 
 Social History
 
 
+--------------+-------+-----------+--------+------+
| Tobacco Use  | Types | Packs/Day | Years  | Date |
|              |       |           | Used   |      |
+--------------+-------+-----------+--------+------+
| Never Smoker |       |           |        |      |
+--------------+-------+-----------+--------+------+
 
 
 
+---------------------+---+---+---+
| Smokeless Tobacco:  |   |   |   |
| Never Used          |   |   |   |
+---------------------+---+---+---+
 
 
 
+---------------------------------------------------------------+
| Comments: some second hand smoke exposure, but fairly minimal |
+---------------------------------------------------------------+
 
 
 
+-------------+-------------+---------+----------+
| Alcohol Use | Drinks/Week | oz/Week | Comments |
+-------------+-------------+---------+----------+
| No          |             |         |          |
+-------------+-------------+---------+----------+
 
 
 
 
+------------------+---------------+
| Sex Assigned at  | Date Recorded |
| Birth            |               |
+------------------+---------------+
| Not on file      |               |
+------------------+---------------+
 
 
 
+----------------+-------------+-------------+
| Job Start Date | Occupation  | Industry    |
+----------------+-------------+-------------+
| Not on file    | Not on file | Not on file |
+----------------+-------------+-------------+
 
 
 
+----------------+--------------+------------+
| Travel History | Travel Start | Travel End |
+----------------+--------------+------------+
 
 
 
+-------------------------------------+
| No recent travel history available. |
+-------------------------------------+
 documented as of this encounter
 
 Medications at Time of Discharge
 
 
+----------------------+----------------------+-----------+---------+----------+-----------+
| Medication           | Sig                  | Dispensed | Refills | Start    | End Date  |
|                      |                      |           |         | Date     |           |
+----------------------+----------------------+-----------+---------+----------+-----------+
|   glucose blood VI   | Check blood sugar    |   100     | 12      | 20 |           |
| test strips (ONE     | before each meal and | each      |         | 12       |           |
| TOUCH ULTRA TEST)    |  as directed         |           |         |          |           |
| stripIndications:    |                      |           |         |          |           |
| Unspecified          |                      |           |         |          |           |
| hypertensive kidney  |                      |           |         |          |           |
| disease with chronic |                      |           |         |          |           |
|  kidney disease      |                      |           |         |          |           |
| stage I through      |                      |           |         |          |           |
| stage IV, or         |                      |           |         |          |           |
| unspecified(403.90), |                      |           |         |          |           |
|  Complications of    |                      |           |         |          |           |
| transplanted kidney, |                      |           |         |          |           |
|  Diabetes mellitus   |                      |           |         |          |           |
| type II,             |                      |           |         |          |           |
| uncontrolled (HCC),  |                      |           |         |          |           |
| Hyperlipidemia       |                      |           |         |          |           |
+----------------------+----------------------+-----------+---------+----------+-----------+
|   albuterol (PROAIR  | Inhale 2 puffs into  |   1       | 2       | 20 |  |
| HFA) 90 mcg/puff     | the lungs every 6    | Inhaler   |         | 13       | 4         |
| inhalerIndications:  | hours as needed for  |           |         |          |           |
| Cough                | Wheezing.            |           |         |          |           |
+----------------------+----------------------+-----------+---------+----------+-----------+
|   allopurinol        | Take 100 mg by mouth |           | 0       | 20 |  |
 
| (ZYLOPRIM) 100 mg    |  Daily.              |           |         | 12       | 3         |
| tablet               |                      |           |         |          |           |
+----------------------+----------------------+-----------+---------+----------+-----------+
|   aspirin 81 MG EC   | Take 81 mg by mouth  |           | 0       | 20 |  |
| tablet               | Daily.               |           |         | 12       | 5         |
+----------------------+----------------------+-----------+---------+----------+-----------+
|   Cholecalciferol    | Take 5,000 Units by  |           | 0       | 20 |  |
| (VITAMIN D3) 5000    | mouth Once a week.   |           |         | 12       | 4         |
| UNITS CAPS           |                      |           |         |          |           |
+----------------------+----------------------+-----------+---------+----------+-----------+
|   cinacalcet         | Take 1 tablet by     |   30      | 12      | 10/29/20 |  |
| (SENSIPAR) 30 mg     | mouth Daily.         | tablet    |         | 12       | 3         |
| tablet               |                      |           |         |          |           |
+----------------------+----------------------+-----------+---------+----------+-----------+
|   fludrocortisone    | Take 1 tablet by     |   30      | 11      | 10/01/20 |  |
| (FLORINEF) 0.1 mg    | mouth Every other    | tablet    |         | 12       | 4         |
| tablet               | day.                 |           |         |          |           |
+----------------------+----------------------+-----------+---------+----------+-----------+
|   furosemide (LASIX) | Take two tablets by  |   60      | 5       | 20 | 07/15/201 |
|  40 mg tablet        | mouth daily.         | tablet    |         | 12       | 3         |
+----------------------+----------------------+-----------+---------+----------+-----------+
|   insulin glargine   | Inject 20 Units      |           | 0       | 20 |  |
| (LANTUS) 100         | under the skin every |           |         | 12       | 3         |
| units/mL             |  morning.            |           |         |          |           |
| injectionIndications |                      |           |         |          |           |
| : Unspecified        |                      |           |         |          |           |
| hypertensive kidney  |                      |           |         |          |           |
| disease with chronic |                      |           |         |          |           |
|  kidney disease      |                      |           |         |          |           |
| stage I through      |                      |           |         |          |           |
| stage IV, or         |                      |           |         |          |           |
| unspecified(403.90), |                      |           |         |          |           |
|  Complications of    |                      |           |         |          |           |
| transplanted kidney, |                      |           |         |          |           |
|  Diabetes mellitus   |                      |           |         |          |           |
| type II,             |                      |           |         |          |           |
| uncontrolled (HCC),  |                      |           |         |          |           |
| Hyperlipidemia       |                      |           |         |          |           |
+----------------------+----------------------+-----------+---------+----------+-----------+
|   insulin lispro     | Inject               |           | 0       | 20 |  |
| (HUMALOG) 100        | subcutaneously       |           |         | 12       | 3         |
| units/mL injection   | before meals         |           |         |          |           |
|                      | according to sliding |           |         |          |           |
|                      |  scale               |           |         |          |           |
+----------------------+----------------------+-----------+---------+----------+-----------+
|   Insulin            | Use to inject        |           | 0       | 20 |  |
| Syringe-Needle U-100 | insulin              |           |         | 12       | 3         |
|  (BD INSULIN SYRINGE | subcutaneously       |           |         |          |           |
|  ULTRAFINE) 31G X    | before meals or as   |           |         |          |           |
| " 0.5 ML MISC    | directed             |           |         |          |           |
+----------------------+----------------------+-----------+---------+----------+-----------+
|   levothyroxine      | Take 1 tablet by     |   30      | 11      | 10/01/20 |  |
| (LEVOTHROID) 50 mcg  | mouth Daily.         | tablet    |         | 12       | 3         |
| tablet               |                      |           |         |          |           |
+----------------------+----------------------+-----------+---------+----------+-----------+
|   lisinopril         | Take 1 tablet by     |   90      | 3       | 20 |  |
| (PRINIVIL,ZESTRIL)   | mouth Daily.         | tablet    |         | 12       | 3         |
| 30 MG tablet         |                      |           |         |          |           |
+----------------------+----------------------+-----------+---------+----------+-----------+
|   loperamide         | Take 2 mg by mouth   |           | 0       | 20 |  |
 
| (ANTI-DIARRHEAL) 2   | Daily as needed.     |           |         | 12       | 4         |
| mg capsule           |                      |           |         |          |           |
+----------------------+----------------------+-----------+---------+----------+-----------+
|   magnesium oxide    | Take 400 mg by mouth |           | 0       | 20 |  |
| (MAG-OX) 400 mg      |  2 times daily.      |           |         | 12       | 3         |
| tablet               |                      |           |         |          |           |
+----------------------+----------------------+-----------+---------+----------+-----------+
|   metoprolol         | Take 25 mg by mouth  |           | 0       | 20 |  |
| tartrate (LOPRESSOR) | 2 times daily.       |           |         | 12       | 3         |
|  25 mg tablet        |                      |           |         |          |           |
+----------------------+----------------------+-----------+---------+----------+-----------+
|   mycophenolate      | Take 250 mg by mouth |           | 0       | 20 | 10/14/201 |
| (CELLCEPT) 250 mg    |  3 times daily.      |           |         | 11       | 5         |
| capsule              |                      |           |         |          |           |
+----------------------+----------------------+-----------+---------+----------+-----------+
|   niacin (NIASPAN)   | Not taking           |           | 0       | 20 |  |
| 500 mg CR tablet     |                      |           |         | 11       | 3         |
+----------------------+----------------------+-----------+---------+----------+-----------+
|   omeprazole         | Take one capsule by  |           | 0       | 20 |  |
| (PRILOSEC) 20 mg     | mouth once daily on  |           |         | 12       | 3         |
| capsule              | an empty stomach     |           |         |          |           |
+----------------------+----------------------+-----------+---------+----------+-----------+
|   predniSONE         | Take 1 tablet by     |   90      | 3       | 20 |  |
| (DELTASONE) 5 mg     | mouth Daily.         | tablet    |         | 12       | 4         |
| tablet               |                      |           |         |          |           |
+----------------------+----------------------+-----------+---------+----------+-----------+
|   Prenatal           | Take 0.8 mg by mouth |           | 0       | 20 |  |
| Multivit-Min-Fe-FA   |  Daily.              |           |         | 12       | 3         |
| (PRENATAL VITAMINS)  |                      |           |         |          |           |
| 0.8 MG TABS          |                      |           |         |          |           |
+----------------------+----------------------+-----------+---------+----------+-----------+
|   rosuvastatin       | Take 20 mg by mouth  |           | 0       |          |  |
| (CRESTOR) 40 MG      | nightly.             |           |         |          | 3         |
| tabletIndications:   |                      |           |         |          |           |
| Unspecified          |                      |           |         |          |           |
| hypertensive kidney  |                      |           |         |          |           |
| disease with chronic |                      |           |         |          |           |
|  kidney disease      |                      |           |         |          |           |
| stage I through      |                      |           |         |          |           |
| stage IV, or         |                      |           |         |          |           |
| unspecified(403.90), |                      |           |         |          |           |
|  Complications of    |                      |           |         |          |           |
| transplanted kidney, |                      |           |         |          |           |
|  Diabetes mellitus   |                      |           |         |          |           |
| type II,             |                      |           |         |          |           |
| uncontrolled (HCC),  |                      |           |         |          |           |
| Hyperlipidemia       |                      |           |         |          |           |
+----------------------+----------------------+-----------+---------+----------+-----------+
|   tacrolimus         | TAD.                 |           | 0       | 20 | 07/15/201 |
| (PROGRAF) 0.5 mg     |                      |           |         | 12       | 4         |
| capsuleIndications:  |                      |           |         |          |           |
| Unspecified          |                      |           |         |          |           |
| hypertensive kidney  |                      |           |         |          |           |
| disease with chronic |                      |           |         |          |           |
|  kidney disease      |                      |           |         |          |           |
| stage I through      |                      |           |         |          |           |
| stage IV, or         |                      |           |         |          |           |
| unspecified(403.90), |                      |           |         |          |           |
|  Complications of    |                      |           |         |          |           |
| transplanted kidney, |                      |           |         |          |           |
 
|  Diabetes mellitus   |                      |           |         |          |           |
| type II,             |                      |           |         |          |           |
| uncontrolled (HCC),  |                      |           |         |          |           |
| Hyperlipidemia       |                      |           |         |          |           |
+----------------------+----------------------+-----------+---------+----------+-----------+
|   tacrolimus         | Take 1 mg by mouth 2 |           | 0       | 20 |  |
| (PROGRAF) 1 mg       |  times daily.        |           |         | 12       | 3         |
| capsuleIndications:  |                      |           |         |          |           |
| Unspecified          |                      |           |         |          |           |
| hypertensive kidney  |                      |           |         |          |           |
| disease with chronic |                      |           |         |          |           |
|  kidney disease      |                      |           |         |          |           |
| stage I through      |                      |           |         |          |           |
| stage IV, or         |                      |           |         |          |           |
| unspecified(403.90), |                      |           |         |          |           |
|  Complications of    |                      |           |         |          |           |
| transplanted kidney, |                      |           |         |          |           |
|  Diabetes mellitus   |                      |           |         |          |           |
| type II,             |                      |           |         |          |           |
| uncontrolled (HCC),  |                      |           |         |          |           |
| Hyperlipidemia       |                      |           |         |          |           |
+----------------------+----------------------+-----------+---------+----------+-----------+
|   valsartan (DIOVAN) | Take 1 tablet by     |   30      | 11      | 20 |  |
|  160 mg tablet       | mouth Daily.         | tablet    |         | 13       | 4         |
+----------------------+----------------------+-----------+---------+----------+-----------+
 documented as of this encounter
 
 Plan of Treatment
 
 
+--------+-----------+------------+----------------------+-------------------+
| Date   | Type      | Specialty  | Care Team            | Description       |
+--------+-----------+------------+----------------------+-------------------+
| / | Office    | Cardiology |   Tonia Wilson,    |                   |
|    | Visit     |            | ARNP  [address]   |                   |
|        |           |            | St  [facility] [place]  |                   |
|        |           |            | [address]  [phone]  |                   |
|        |           |            |  [phone] (Fax)  |                   |
+--------+-----------+------------+----------------------+-------------------+
| / | Hospital  | Radiology  |   Popeye Townsend, |                   |
|    | Encounter |            |  MD  [address] |                   |
|        |           |            |  StNikkie  [place],   |                   |
|        |           |            | [place] [address]             |                   |
|        |           |            | [phone]         |                   |
|        |           |            | [phone] (Fax)   |                   |
+--------+-----------+------------+----------------------+-------------------+
| / | Surgery   | Radiology  |   Popeye Townsend, | CV EP PPM SYSTEM  |
|    |           |            |  MD  [address] | IMPLANT           |
|        |           |            |  St.  [place],   |                   |
|        |           |            | [place] [address]             |                   |
|        |           |            | [phone]         |                   |
|        |           |            | [phone] (Fax)   |                   |
+--------+-----------+------------+----------------------+-------------------+
| 02/10/ | Clinical  | Cardiology |                      |                   |
|    | Support   |            |                      |                   |
+--------+-----------+------------+----------------------+-------------------+
| / | Office    | Cardiology |   RakeshTonia andre,    |                   |
|    | Visit     |            | BELKIS  [address]   |                   |
|        |           |            | [place]  |                   |
|        |           |            | [address]  [phone]  |                   |
 
|        |           |            |  [phone] (Fax)  |                   |
+--------+-----------+------------+----------------------+-------------------+
| / | Off-Site  | Nephrology |   Marzena Moser  |                   |
|    | Visit     |            | DO ETIENNE  [address] [place] [address]      |                   |
|        |           |            | [address]      |                   |
|        |           |            | [place]      |                   |
|        |           |            | [address] [phone]  |                   |
|        |           |            |  [phone] (Fax)  |                   |
+--------+-----------+------------+----------------------+-------------------+
 documented as of this encounter
 
 Visit Diagnoses
 Not on filedocumented in this encounter

## 2020-01-08 NOTE — XMS
Encounter Summary
  Created on: 2020
 
 Viviana Ricci
 External Reference #: [id number]
 : 47
 Sex: Female
 
 Demographics
 
 
+-----------------------+----------------------+
| Address               | [address]      |
|                       | [place]  [address] |
+-----------------------+----------------------+
| Home Phone            | [phone]      |
+-----------------------+----------------------+
| Preferred Language    | Unknown              |
+-----------------------+----------------------+
| Marital Status        | Single               |
+-----------------------+----------------------+
| Hindu Affiliation | [id number]                 |
+-----------------------+----------------------+
| Race                  | Unknown              |
+-----------------------+----------------------+
| Ethnic Group          | Unknown              |
+-----------------------+----------------------+
 
 
 Author
 
 
+--------------+--------------------------------------------+
| Author       | [facility]  |
|              | and [facility]                                |
+--------------+--------------------------------------------+
| Organization | [facility]  |
|              | and [facility]                                |
+--------------+--------------------------------------------+
| Address      | Unknown                                    |
+--------------+--------------------------------------------+
| Phone        | Unavailable                                |
+--------------+--------------------------------------------+
 
 
 
 Support
 
 
+----------------+--------------+---------------------+-----------------+
| Name           | Relationship | Address             | Phone           |
+----------------+--------------+---------------------+-----------------+
| Ada/Ed Radhames | ECON         | [address]              | [phone] |
|                |              | [place]  |                 |
|                |              |  [address]              |                 |
+----------------+--------------+---------------------+-----------------+
 
 
 
 
 Care Team Providers
 
 
+-----------------------+------+-------------+
| Care Team Member Name | Role | Phone       |
+-----------------------+------+-------------+
 PCP  | Unavailable |
+-----------------------+------+-------------+
 
 
 
 Encounter Details
 
 
+--------+-------------+---------------------+----------------------+----------------------+
| Date   | Type        | Department          | Care Team            | Description          |
+--------+-------------+---------------------+----------------------+----------------------+
| / | Orders Only |   [facility]         |   Marzena Moser  | UTI (urinary tract   |
|    |             | NEPHROLOGY  [address]   | M, DO  [address]      | infection) (Primary  |
|        |             | [address]   | [address]      | Dx)                  |
|        |             | [place], [place]     | [place], [place]      |                      |
|        |             | [address]          | [address]  [phone]  |                      |
|        |             | [phone]        |  [phone] (Fax)  |                      |
+--------+-------------+---------------------+----------------------+----------------------+
 
 
 
 Social History
 
 
+--------------+-------+-----------+--------+------+
| Tobacco Use  | Types | Packs/Day | Years  | Date |
|              |       |           | Used   |      |
+--------------+-------+-----------+--------+------+
| Never Smoker |       |           |        |      |
+--------------+-------+-----------+--------+------+
 
 
 
+---------------------+---+---+---+
| Smokeless Tobacco:  |   |   |   |
| Never Used          |   |   |   |
+---------------------+---+---+---+
 
 
 
+-------------+-------------+---------+----------+
| Alcohol Use | Drinks/Week | oz/Week | Comments |
+-------------+-------------+---------+----------+
| No          |             |         |          |
+-------------+-------------+---------+----------+
 
 
 
+------------------+---------------+
| Sex Assigned at  | Date Recorded |
| Birth            |               |
+------------------+---------------+
| Not on file      |               |
 
+------------------+---------------+
 
 
 
+----------------+-------------+-------------+
| Job Start Date | Occupation  | Industry    |
+----------------+-------------+-------------+
| Not on file    | Not on file | Not on file |
+----------------+-------------+-------------+
 
 
 
+----------------+--------------+------------+
| Travel History | Travel Start | Travel End |
+----------------+--------------+------------+
 
 
 
+-------------------------------------+
| No recent travel history available. |
+-------------------------------------+
 documented as of this encounter
 
 Progress Notes
 Cherelle Julian RN - 2013 10:40 AM PSTPatient called c/o UTI.  She did labs an
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  Per Dr Moser, roly durant should take Cipro 500 mg bid for 10 days.  Patient notified and agreed to this plan.  E
lectronically signed by Cherelle Julian RN at 2013 11:29 AM PSTdocumented in thi
s encounter
 
 Plan of Treatment
 
 
+--------+-----------+------------+----------------------+-------------------+
| Date   | Type      | Specialty  | Care Team            | Description       |
+--------+-----------+------------+----------------------+-------------------+
| / | Office    | Cardiology |   Tonia Wilson,    |                   |
|    | Visit     |            | [address]   |                   |
|        |           |            | [address] [place] [facility] [place]  |                   |
|        |           |            | [id number] [phone]  |                   |
|        |           |            |  [phone] (Fax)  |                   |
+--------+-----------+------------+----------------------+-------------------+
| / | Hospital  | Radiology  |   Popeye Townsend, |                   |
2020   | Encounter |            |  MD  [address] |                   |
|        |           |            |  St.  [place],   |                   |
|        |           |            | [place] [address]             |                   |
|        |           |            | [phone]         |                   |
|        |           |            | [phone] (Fax)   |                   |
+--------+-----------+------------+----------------------+-------------------+
| / | Surgery   | Radiology  |   Popeye Townsend, | CV EP PPM SYSTEM  |
|    |           |            |  MD  [address] | IMPLANT           |
|        |           |            |  St.  [place],   |                   |
|        |           |            | [place] [address]             |                   |
|        |           |            | [phone]         |                   |
|        |           |            | [phone] (Fax)   |                   |
+--------+-----------+------------+----------------------+-------------------+
| 02/10/ | Clinical  | Cardiology |                      |                   |
2020   | Support   |            |                      |                   |
+--------+-----------+------------+----------------------+-------------------+
| / | Office    | Cardiology |   Tonia Wilson,    |                   |
 
|    | Visit     |            | ARN  [address]   |                   |
|        |           |            |   [place]  |                   |
|        |           |            | [address]  [phone]  |                   |
|        |           |            |  [phone] (Fax)  |                   |
+--------+-----------+------------+----------------------+-------------------+
| / | Off-Site  | Nephrology |   Marzena Moser  |                   |
|    | Visit     |            | DO ETIENNE  [address] [place] [address]      |                   |
|        |           |            | [address]      |                   |
|        |           |            | [place]      |                   |
|        |           |            | [address]  [phone]  |                   |
|        |           |            |  [phone] (Fax)  |                   |
+--------+-----------+------------+----------------------+-------------------+
 documented as of this encounter
 
 Visit Diagnoses
 
 
+----------------------------------------------------------------------------------------+
| Diagnosis                                                                              |
+----------------------------------------------------------------------------------------+
|   UTI (urinary tract infection) - Primary  Urinary tract infection, site not specified |
+----------------------------------------------------------------------------------------+
 documented in this encounter

## 2020-01-08 NOTE — XMS
Encounter Summary
  Created on: 2020
 
 Viviana Ricci
 External Reference #: [id number]
 : 47
 Sex: Female
 
 Demographics
 
 
+-----------------------+----------------------+
| Address               | [address]      |
|                       | [place]  [address] |
+-----------------------+----------------------+
| Home Phone            | [phone]      |
+-----------------------+----------------------+
| Preferred Language    | Unknown              |
+-----------------------+----------------------+
| Marital Status        | Single               |
+-----------------------+----------------------+
| Rastafari Affiliation | 1009                 |
+-----------------------+----------------------+
| Race                  | Unknown              |
+-----------------------+----------------------+
| Ethnic Group          | Unknown              |
+-----------------------+----------------------+
 
 
 Author
 
 
+--------------+--------------------------------------------+
| Author       | [facility]  |
|              | and [facility]                                |
+--------------+--------------------------------------------+
| Organization | [facility]  |
|              | and [facility]                                |
+--------------+--------------------------------------------+
| Address      | Unknown                                    |
+--------------+--------------------------------------------+
| Phone        | Unavailable                                |
+--------------+--------------------------------------------+
 
 
 
 Support
 
 
+----------------+--------------+---------------------+-----------------+
| Name           | Relationship | Address             | Phone           |
+----------------+--------------+---------------------+-----------------+
| Ada/Ed Radhames | ECON         | [address]              | [phone] |
|                |              | [place]  |                 |
|                |              |  [address]              |                 |
+----------------+--------------+---------------------+-----------------+
 
 
 
 
 Care Team Providers
 
 
+-----------------------+------+-------------+
| Care Team Member Name | Role | Phone       |
+-----------------------+------+-------------+
 PCP  | Unavailable |
+-----------------------+------+-------------+
 
 
 
 Encounter Details
 
 
+--------+----------+---------------------+----------------------+-------------+
| Date   | Type     | Department          | Care Team            | Description |
+--------+----------+---------------------+----------------------+-------------+
| / | Abstract |   [facility] [place]         |   Marzena Moser  |             |
|    |          | NEPHROLOGY  [address]   | M, DO  [address]      |             |
|        |          | [address]   | [address]      |             |
|        |          | [place], [place]     | [place]      |             |
|        |          | [address]          | [address]  [phone]  |             |
|        |          | [phone]        |  [phone] (Fax)  |             |
+--------+----------+---------------------+----------------------+-------------+
 
 
 
 Social History
 
 
+--------------+-------+-----------+--------+------+
| Tobacco Use  | Types | Packs/Day | Years  | Date |
|              |       |           | Used   |      |
+--------------+-------+-----------+--------+------+
| Never Smoker |       |           |        |      |
+--------------+-------+-----------+--------+------+
 
 
 
+---------------------+---+---+---+
| Smokeless Tobacco:  |   |   |   |
| Never Used          |   |   |   |
+---------------------+---+---+---+
 
 
 
+---------------------------------------------------------------+
| Comments: some second hand smoke exposure, but fairly minimal |
+---------------------------------------------------------------+
 
 
 
+-------------+-------------+---------+----------+
| Alcohol Use | Drinks/Week | oz/Week | Comments |
+-------------+-------------+---------+----------+
| No          |             |         |          |
+-------------+-------------+---------+----------+
 
 
 
 
+------------------+---------------+
| Sex Assigned at  | Date Recorded |
| Birth            |               |
+------------------+---------------+
| Not on file      |               |
+------------------+---------------+
 
 
 
+----------------+-------------+-------------+
| Job Start Date | Occupation  | Industry    |
+----------------+-------------+-------------+
| Not on file    | Not on file | Not on file |
+----------------+-------------+-------------+
 
 
 
+----------------+--------------+------------+
| Travel History | Travel Start | Travel End |
+----------------+--------------+------------+
 
 
 
+-------------------------------------+
| No recent travel history available. |
+-------------------------------------+
 documented as of this encounter
 
 Progress Maite Perez - 2016  4:37 PM PSTOutside record:  Imaging of rt shoulder/rt humeru
s from , dos 16.  Sent to scan.Electronically signed by Maite Raygoza at 2016  4:38 PM PSTdocumented in this encounter
 
 Plan of Treatment
 
 
+--------+-----------+------------+----------------------+-------------------+
| Date   | Type      | Specialty  | Care Team            | Description       |
+--------+-----------+------------+----------------------+-------------------+
| / | Office    | Cardiology |   Tonia Wilson,    |                   |
|    | Visit     |            | ARNJESSICA  [address]   |                   |
|        |           |            | [facility] [place]  |                   |
|        |           |            | [address]  [phone]  |                   |
|        |           |            |  [phone] (Fax)  |                   |
+--------+-----------+------------+----------------------+-------------------+
| / | Hospital  | Radiology  |   Popeye Townsend, |                   |
|    | Encounter |            |  MD  [address] |                   |
|        |           |            |  St.  [place],   |                   |
|        |           |            | [place] [address]             |                   |
|        |           |            | [phone]         |                   |
|        |           |            | [phone] (Fax)   |                   |
+--------+-----------+------------+----------------------+-------------------+
| / | Surgery   | Radiology  |   Popeye Townsend, | CV EP PPM SYSTEM  |
|    |           |            |  MD  [address] | IMPLANT           |
|        |           |            |  St.  [place],   |                   |
|        |           |            | [place] [address]             |                   |
|        |           |            | [phone]         |                   |
|        |           |            | [phone] (Fax)   |                   |
+--------+-----------+------------+----------------------+-------------------+
 
| 02/10/ | Clinical  | Cardiology |                      |                   |
2020   | Support   |            |                      |                   |
+--------+-----------+------------+----------------------+-------------------+
| / | Office    | Cardiology |   Tonia Wilson,    |                   |
|    | Visit     |            | BELKIS  [address]   |                   |
|        |           |            | [place]  |                   |
|        |           |            | [address]  [phone]  |                   |
|        |           |            |  [phone] (Fax)  |                   |
+--------+-----------+------------+----------------------+-------------------+
| / | Off-Site  | Nephrology |   Marzena Moser  |                   |
|    | Visit     |            | DO ETIENNE  [address] [place] [address]      |                   |
|        |           |            | [address]      |                   |
|        |           |            | [place]      |                   |
|        |           |            | [address]  [phone]  |                   |
|        |           |            |  [phone] (Fax)  |                   |
+--------+-----------+------------+----------------------+-------------------+
 documented as of this encounter
 
 Visit Diagnoses
 Not on filedocumented in this encounter

## 2020-01-08 NOTE — XMS
Encounter Summary
  Created on: 2020
 
 Viviana Ricci
 External Reference #: [id number]
 : 47
 Sex: Female
 
 Demographics
 
 
+-----------------------+----------------------+
| Address               | [address]      |
|                       | [place]  [address] |
+-----------------------+----------------------+
| Home Phone            | [phone]      |
+-----------------------+----------------------+
| Preferred Language    | Unknown              |
+-----------------------+----------------------+
| Marital Status        | Single               |
+-----------------------+----------------------+
| Denominational Affiliation | [id number]                 |
+-----------------------+----------------------+
| Race                  | Unknown              |
+-----------------------+----------------------+
| Ethnic Group          | Unknown              |
+-----------------------+----------------------+
 
 
 Author
 
 
+--------------+--------------------------------------------+
| Author       | [facility]  |
|              | and [place]                                |
+--------------+--------------------------------------------+
| Organization | [facility]  |
|              | and [place]                                |
+--------------+--------------------------------------------+
| Address      | Unknown                                    |
+--------------+--------------------------------------------+
| Phone        | Unavailable                                |
+--------------+--------------------------------------------+
 
 
 
 Support
 
 
+----------------+--------------+---------------------+-----------------+
| Name           | Relationship | Address             | Phone           |
+----------------+--------------+---------------------+-----------------+
| Ada/Ed Radhames | ECON         | [place]              | [phone] |
|                |              | [place]  |                 |
|                |              |  [id number]              |                 |
+----------------+--------------+---------------------+-----------------+
 
 
 
 
 Care Team Providers
 
 
+-----------------------+------+-------------+
| Care Team Member Name | Role | Phone       |
+-----------------------+------+-------------+
 PCP  | Unavailable |
+-----------------------+------+-------------+
 
 
 
 Reason for Visit
 
 
+----------------+------------+
| Reason         | Comments   |
+----------------+------------+
| Urinary Tract  | medication |
| Infection      |            |
+----------------+------------+
 
 
 
 Encounter Details
 
 
+--------+-----------+---------------------+----------------------+----------------+
| Date   | Type      | Department          | Care Team            | Description    |
+--------+-----------+---------------------+----------------------+----------------+
| / | Telephone |   [facility]         |   Rosa Moserias  | Urinary Tract  |
|    |           | NEPHROLOGY  [address]   | M, DO  [address]      | Infection      |
|        |           | [address]   | [address]      | (medication)   |
|        |           | [place], [place]     | [place], [place]      |                |
|        |           | [address]          | [address]  [phone]  |                |
|        |           | [phone]        |  [phone] (Fax)  |                |
+--------+-----------+---------------------+----------------------+----------------+
 
 
 
 Social History
 
 
+--------------+-------+-----------+--------+------+
| Tobacco Use  | Types | Packs/Day | Years  | Date |
|              |       |           | Used   |      |
+--------------+-------+-----------+--------+------+
| Never Smoker |       |           |        |      |
+--------------+-------+-----------+--------+------+
 
 
 
+---------------------+---+---+---+
| Smokeless Tobacco:  |   |   |   |
| Never Used          |   |   |   |
+---------------------+---+---+---+
 
 
 
+---------------------------------------------------------------+
 
| Comments: some second hand smoke exposure, but fairly minimal |
+---------------------------------------------------------------+
 
 
 
+-------------+-------------+---------+----------+
| Alcohol Use | Drinks/Week | oz/Week | Comments |
+-------------+-------------+---------+----------+
| No          |             |         |          |
+-------------+-------------+---------+----------+
 
 
 
+------------------+---------------+
| Sex Assigned at  | Date Recorded |
| Birth            |               |
+------------------+---------------+
| Not on file      |               |
+------------------+---------------+
 
 
 
+----------------+-------------+-------------+
| Job Start Date | Occupation  | Industry    |
+----------------+-------------+-------------+
| Not on file    | Not on file | Not on file |
+----------------+-------------+-------------+
 
 
 
+----------------+--------------+------------+
| Travel History | Travel Start | Travel End |
+----------------+--------------+------------+
 
 
 
+-------------------------------------+
| No recent travel history available. |
+-------------------------------------+
 documented as of this encounter
 
 Plan of Treatment
 
 
+--------+-----------+------------+----------------------+-------------------+
| Date   | Type      | Specialty  | Care Team            | Description       |
+--------+-----------+------------+----------------------+-------------------+
| / | Office    | Cardiology |   Tonia Wilson,    |                   |
|    | Visit     |            | ARNJESSICA  [address]   |                   |
|        |           |            | St  [place], [place]  |                   |
|        |           |            | [address]  [phone]  |                   |
|        |           |            |  [phone] (Fax)  |                   |
+--------+-----------+------------+----------------------+-------------------+
| / | Hospital  | Radiology  |   Popeye Townsend, |                   |
2020   | Encounter |            |  MD  [address] |                   |
|        |           |            |  St.  [place],   |                   |
|        |           |            | [place] [address]             |                   |
|        |           |            | [phone]         |                   |
|        |           |            | [phone] (Fax)   |                   |
+--------+-----------+------------+----------------------+-------------------+
 
| / | Surgery   | Radiology  |   Popeye Townsend, | CV EP PPM SYSTEM  |
|    |           |            |  MD  [address] | IMPLANT           |
|        |           |            |  St.  [place],   |                   |
|        |           |            | [place] [address]             |                   |
|        |           |            | [phone]         |                   |
|        |           |            | [phone] (Fax)   |                   |
+--------+-----------+------------+----------------------+-------------------+
| 02/10/ | Clinical  | Cardiology |                      |                   |
2020   | Support   |            |                      |                   |
+--------+-----------+------------+----------------------+-------------------+
| / | Office    | Cardiology |   Tonia Wilson,    |                   |
|    | Visit     |            | ARNP  [address]   |                   |
|        |           |            | [place]  |                   |
|        |           |            | [address]  [phone]  |                   |
|        |           |            |  [phone] (Fax)  |                   |
+--------+-----------+------------+----------------------+-------------------+
| / | Off-Site  | Nephrology |   Marzena Moser  |                   |
|    | Visit     |            | DO ETEINNE  [facility]      |                   |
|        |           |            | [address]      |                   |
|        |           |            | [place]      |                   |
|        |           |            | [address]  [phone]  |                   |
|        |           |            |  [phone] (Fax)  |                   |
+--------+-----------+------------+----------------------+-------------------+
 documented as of this encounter
 
 Visit Diagnoses
 Not on filedocumented in this encounter

## 2020-01-08 NOTE — XMS
Encounter Summary
  Created on: 2020
 
 Viviana Ricci
 External Reference #: [id number]
 : 47
 Sex: Female
 
 Demographics
 
 
+-----------------------+----------------------+
| Address               | [address]      |
|                       | [place]  [address] |
+-----------------------+----------------------+
| Home Phone            | [phone]      |
+-----------------------+----------------------+
| Preferred Language    | Unknown              |
+-----------------------+----------------------+
| Marital Status        | Single               |
+-----------------------+----------------------+
| [employer] Affiliation | [id number]                 |
+-----------------------+----------------------+
| Race                  | Unknown              |
+-----------------------+----------------------+
| Ethnic Group          | Unknown              |
+-----------------------+----------------------+
 
 
 Author
 
 
+--------------+--------------------------------------------+
| Author       | [facility]  |
|              | and [place]                                |
+--------------+--------------------------------------------+
| Organization | [facility]  |
|              | and [place]                                |
+--------------+--------------------------------------------+
| Address      | Unknown                                    |
+--------------+--------------------------------------------+
| Phone        | Unavailable                                |
+--------------+--------------------------------------------+
 
 
 
 Support
 
 
+----------------+--------------+---------------------+-----------------+
| Name           | Relationship | Address             | Phone           |
+----------------+--------------+---------------------+-----------------+
| Ada/Ed Radhames | ECON         | [place]              | [phone] |
|                |              | [place]  |                 |
|                |              |  [address]              |                 |
+----------------+--------------+---------------------+-----------------+
 
 
 
 
 Care Team Providers
 
 
+-----------------------+------+-------------+
| Care Team Member Name | Role | Phone       |
+-----------------------+------+-------------+
 PCP  | Unavailable |
+-----------------------+------+-------------+
 
 
 
 Reason for Visit
 
 
+----------------+----------+
| Reason         | Comments |
+----------------+----------+
| Urinary Tract  |          |
| Infection      |          |
+----------------+----------+
 
 
 
 Encounter Details
 
 
+--------+-----------+---------------------+----------------------+----------------+
| Date   | Type      | Department          | Care Team            | Description    |
+--------+-----------+---------------------+----------------------+----------------+
| / | Telephone |   [facility] [place]         |   Marzena Moser  | Urinary Tract  |
| 2018   |           | NEPHROLOGY  [address]   | M, DO  [address]      | Infection      |
|        |           | [address]   | [address]      |                |
|        |           | [place], [place]     | [place], [place]      |                |
|        |           | [address]          | [address]  [phone]  |                |
|        |           | [phone]        |  [phone] (Fax)  |                |
+--------+-----------+---------------------+----------------------+----------------+
 
 
 
 Social History
 
 
+--------------+-------+-----------+--------+------+
| Tobacco Use  | Types | Packs/Day | Years  | Date |
|              |       |           | Used   |      |
+--------------+-------+-----------+--------+------+
| Never Smoker |       |           |        |      |
+--------------+-------+-----------+--------+------+
 
 
 
+---------------------+---+---+---+
| Smokeless Tobacco:  |   |   |   |
| Never Used          |   |   |   |
+---------------------+---+---+---+
 
 
 
+---------------------------------------------------------------+
 
| Comments: some second hand smoke exposure, but fairly minimal |
+---------------------------------------------------------------+
 
 
 
+-------------+-------------+---------+----------+
| Alcohol Use | Drinks/Week | oz/Week | Comments |
+-------------+-------------+---------+----------+
| No          |             |         |          |
+-------------+-------------+---------+----------+
 
 
 
+------------------+---------------+
| Sex Assigned at  | Date Recorded |
| Birth            |               |
+------------------+---------------+
| Not on file      |               |
+------------------+---------------+
 
 
 
+----------------+-------------+-------------+
| Job Start Date | Occupation  | Industry    |
+----------------+-------------+-------------+
| Not on file    | Not on file | Not on file |
+----------------+-------------+-------------+
 
 
 
+----------------+--------------+------------+
| Travel History | Travel Start | Travel End |
+----------------+--------------+------------+
 
 
 
+-------------------------------------+
| No recent travel history available. |
+-------------------------------------+
 documented as of this encounter
 
 Plan of Treatment
 
 
+--------+-----------+------------+----------------------+-------------------+
| Date   | Type      | Specialty  | Care Team            | Description       |
+--------+-----------+------------+----------------------+-------------------+
| / | Office    | Cardiology |   Tonia Wilson,    |                   |
|    | Visit     |            | ARNJESSICA  [address]   |                   |
|        |           |            | St  [place] [facility] [place]  |                   |
|        |           |            | [address]  [phone]  |                   |
|        |           |            |  [phone] (Fax)  |                   |
+--------+-----------+------------+----------------------+-------------------+
| / | Hospital  | Radiology  |   Popeye Townsend, |                   |
|    | Encounter |            |  MD  [address] |                   |
|        |           |            |  [place],   |                   |
|        |           |            | [place] [address]             |                   |
|        |           |            | [phone]         |                   |
|        |           |            | [phone] (Fax)   |                   |
+--------+-----------+------------+----------------------+-------------------+
 
| / | Surgery   | Radiology  |   Popeye Townsend, | CV EP PPM SYSTEM  |
|    |           |            |  MD  [address] | IMPLANT           |
|        |           |            |  St.  [place],   |                   |
|        |           |            | [place] [address]             |                   |
|        |           |            | [phone]         |                   |
|        |           |            | [phone] (Fax)   |                   |
+--------+-----------+------------+----------------------+-------------------+
| 02/10/ | Clinical  | Cardiology |                      |                   |
|    | Support   |            |                      |                   |
+--------+-----------+------------+----------------------+-------------------+
| / | Office    | Cardiology |   Tonia Wilson,    |                   |
|    | Visit     |            | BELKIS  [address]   |                   |
|        |           |            | [place]  |                   |
|        |           |            | [address]  [phone]  |                   |
|        |           |            |  [phone] (Fax)  |                   |
+--------+-----------+------------+----------------------+-------------------+
| / | Off-Site  | Nephrology |   Marzena Moser  |                   |
|    | Visit     |            | DO ETIENNE  [address] [place] [address]      |                   |
|        |           |            | [address]      |                   |
|        |           |            | [place]      |                   |
|        |           |            | [id number] [phone]  |                   |
|        |           |            |  [phone] (Fax)  |                   |
+--------+-----------+------------+----------------------+-------------------+
 documented as of this encounter
 
 Visit Diagnoses
 
 
+-------------------------------------------------------------------------------------+
| Diagnosis                                                                           |
+-------------------------------------------------------------------------------------+
|   UTI (lower urinary tract infection) - Primary  Urinary tract infection, site not  |
| specified                                                                           |
+-------------------------------------------------------------------------------------+
 documented in this encounter

## 2020-01-08 NOTE — XMS
Encounter Summary
  Created on: 2020
 
 Ras Hardy
 External Reference #: [id number]
 : 47
 Sex: Female
 
 Demographics
 
 
+-----------------------+----------------------+
| Address               | [address]      |
|                       | [place]  [address] |
+-----------------------+----------------------+
| Home Phone            | [phone]      |
+-----------------------+----------------------+
| Preferred Language    | Unknown              |
+-----------------------+----------------------+
| Marital Status        | Single               |
+-----------------------+----------------------+
| Spiritism Affiliation | [id number]                 |
+-----------------------+----------------------+
| Race                  | Unknown              |
+-----------------------+----------------------+
| Ethnic Group          | Unknown              |
+-----------------------+----------------------+
 
 
 Author
 
 
+--------------+--------------------------------------------+
| Author       | [facility] [place]  |
|              | and [place]                                |
+--------------+--------------------------------------------+
| Organization | [facility] [place]  |
|              | and [place]                                |
+--------------+--------------------------------------------+
| Address      | Unknown                                    |
+--------------+--------------------------------------------+
| Phone        | Unavailable                                |
+--------------+--------------------------------------------+
 
 
 
 Support
 
 
+----------------+--------------+---------------------+-----------------+
| Name           | Relationship | Address             | Phone           |
+----------------+--------------+---------------------+-----------------+
| Ada/Ed Radhames | ECON         | [address]              | [phone] |
|                |              | [place]  |                 |
|                |              |  [address]              |                 |
+----------------+--------------+---------------------+-----------------+
 
 
 
 
 Care Team Providers
 
 
+-----------------------+------+-------------+
| Care Team Member Name | Role | Phone       |
+-----------------------+------+-------------+
 PCP  | Unavailable |
+-----------------------+------+-------------+
 
 
 
 Encounter Details
 
 
+--------+-----------+----------------------+----------------------+-------------+
| Date   | Type      | Department           | Care Team            | Description |
+--------+-----------+----------------------+----------------------+-------------+
| / | Hospital  |   [facility] |   Abdiel Mireles    | Cough       |
|  - | Encounter |  MED CTR XRAY  [address] | Garry Greenfield MD      |             |
|        |           |  [place]       | [address]       |             |
| / |           | [place] [address] | [place]      |             |
|    |           |   [phone]       | [address]  [phone]  |             |
|        |           |                      |  [phone] (Fax)  |             |
+--------+-----------+----------------------+----------------------+-------------+
 
 
 
 Social History
 
 
+--------------+-------+-----------+--------+------+
| Tobacco Use  | Types | Packs/Day | Years  | Date |
|              |       |           | Used   |      |
+--------------+-------+-----------+--------+------+
| Never Smoker |       |           |        |      |
+--------------+-------+-----------+--------+------+
 
 
 
+---------------------+---+---+---+
| Smokeless Tobacco:  |   |   |   |
| Never Used          |   |   |   |
+---------------------+---+---+---+
 
 
 
+-------------+-------------+---------+----------+
| Alcohol Use | Drinks/Week | oz/Week | Comments |
+-------------+-------------+---------+----------+
| No          |             |         |          |
+-------------+-------------+---------+----------+
 
 
 
+------------------+---------------+
| Sex Assigned at  | Date Recorded |
| Birth            |               |
+------------------+---------------+
| Not on file      |               |
 
+------------------+---------------+
 
 
 
+----------------+-------------+-------------+
| Job Start Date | Occupation  | Industry    |
+----------------+-------------+-------------+
| Not on file    | Not on file | Not on file |
+----------------+-------------+-------------+
 
 
 
+----------------+--------------+------------+
| Travel History | Travel Start | Travel End |
+----------------+--------------+------------+
 
 
 
+-------------------------------------+
| No recent travel history available. |
+-------------------------------------+
 documented as of this encounter
 
 Medications at Time of Discharge
 
 
+----------------------+----------------------+-----------+---------+----------+-----------+
| Medication           | Sig                  | Dispensed | Refills | Start    | End Date  |
|                      |                      |           |         | Date     |           |
+----------------------+----------------------+-----------+---------+----------+-----------+
|   glucose blood VI   | Check blood sugar    |   100     | 12      | 20 |           |
| test strips (ONE     | before each meal and | each      |         | 12       |           |
| TOUCH ULTRA TEST)    |  as directed         |           |         |          |           |
| stripIndications:    |                      |           |         |          |           |
| Unspecified          |                      |           |         |          |           |
| hypertensive kidney  |                      |           |         |          |           |
| disease with chronic |                      |           |         |          |           |
|  kidney disease      |                      |           |         |          |           |
| stage I through      |                      |           |         |          |           |
| stage IV, or         |                      |           |         |          |           |
| unspecified(403.90), |                      |           |         |          |           |
|  Complications of    |                      |           |         |          |           |
| transplanted kidney, |                      |           |         |          |           |
|  Diabetes mellitus   |                      |           |         |          |           |
| type II,             |                      |           |         |          |           |
| uncontrolled (HCC),  |                      |           |         |          |           |
| Hyperlipidemia       |                      |           |         |          |           |
+----------------------+----------------------+-----------+---------+----------+-----------+
|   albuterol (PROAIR  | Inhale 2 puffs into  |   1       | 2       | 20 |  |
| HFA) 90 mcg/puff     | the lungs every 6    | Inhaler   |         | 13       | 4         |
| inhalerIndications:  | hours as needed for  |           |         |          |           |
| Cough                | Wheezing.            |           |         |          |           |
+----------------------+----------------------+-----------+---------+----------+-----------+
|   allopurinol        | Take 100 mg by mouth |           | 0       | 20 |  |
| (ZYLOPRIM) 100 mg    |  Daily.              |           |         | 12       | 3         |
| tablet               |                      |           |         |          |           |
+----------------------+----------------------+-----------+---------+----------+-----------+
|   aspirin 81 MG EC   | Take 81 mg by mouth  |           | 0       | 20 |  |
| tablet               | Daily.               |           |         | 12       | 5         |
+----------------------+----------------------+-----------+---------+----------+-----------+
 
|   azithromycin       | Take 2 tablets       |   6       | 0       | 20 |  |
| (ZITHROMAX) 250 mg   | orally on the first  | tablet    |         | 13       | 3         |
| tablet (ED           | day then take one    |           |         |          |           |
| prepack)Indications: | tablet a day for 4   |           |         |          |           |
|  Cough               | days                 |           |         |          |           |
+----------------------+----------------------+-----------+---------+----------+-----------+
|   Cholecalciferol    | Take 5,000 Units by  |           | 0       | 20 |  |
| (VITAMIN D3) 5000    | mouth Once a week.   |           |         | 12       | 4         |
| UNITS CAPS           |                      |           |         |          |           |
+----------------------+----------------------+-----------+---------+----------+-----------+
|   cinacalcet         | Take 1 tablet by     |   30      | 12      | 10/29/20 |  |
| (SENSIPAR) 30 mg     | mouth Daily.         | tablet    |         | 12       | 3         |
| tablet               |                      |           |         |          |           |
+----------------------+----------------------+-----------+---------+----------+-----------+
|   fludrocortisone    | Take 1 tablet by     |   30      | 11      | 10/01/20 |  |
| (FLORINEF) 0.1 mg    | mouth Every other    | tablet    |         | 12       | 4         |
| tablet               | day.                 |           |         |          |           |
+----------------------+----------------------+-----------+---------+----------+-----------+
|   furosemide (LASIX) | Take two tablets by  |   60      | 5       | 20 | 07/15/201 |
|  40 mg tablet        | mouth daily.         | tablet    |         | 12       | 3         |
+----------------------+----------------------+-----------+---------+----------+-----------+
|   insulin glargine   | Inject 20 Units      |           | 0       | 20 |  |
| (LANTUS) 100         | under the skin every |           |         | 12       | 3         |
| units/mL             |  morning.            |           |         |          |           |
| injectionIndications |                      |           |         |          |           |
| : Unspecified        |                      |           |         |          |           |
| hypertensive kidney  |                      |           |         |          |           |
| disease with chronic |                      |           |         |          |           |
|  kidney disease      |                      |           |         |          |           |
| stage I through      |                      |           |         |          |           |
| stage IV, or         |                      |           |         |          |           |
| unspecified(403.90), |                      |           |         |          |           |
|  Complications of    |                      |           |         |          |           |
| transplanted kidney, |                      |           |         |          |           |
|  Diabetes mellitus   |                      |           |         |          |           |
| type II,             |                      |           |         |          |           |
| uncontrolled (HCC),  |                      |           |         |          |           |
| Hyperlipidemia       |                      |           |         |          |           |
+----------------------+----------------------+-----------+---------+----------+-----------+
|   insulin lispro     | Inject               |           | 0       | 20 |  |
| (HUMALOG) 100        | subcutaneously       |           |         | 12       | 3         |
| units/mL injection   | before meals         |           |         |          |           |
|                      | according to sliding |           |         |          |           |
|                      |  scale               |           |         |          |           |
+----------------------+----------------------+-----------+---------+----------+-----------+
|   Insulin            | Use to inject        |           | 0       | 20 |  |
| Syringe-Needle U-100 | insulin              |           |         | 12       | 3         |
|  (BD INSULIN SYRINGE | subcutaneously       |           |         |          |           |
|  ULTRAFINE) 31G X    | before meals or as   |           |         |          |           |
| " 0.5 ML MISC    | directed             |           |         |          |           |
+----------------------+----------------------+-----------+---------+----------+-----------+
|   levothyroxine      | Take 1 tablet by     |   30      | 11      | 10/01/20 |  |
| (LEVOTHROID) 50 mcg  | mouth Daily.         | tablet    |         | 12       | 3         |
| tablet               |                      |           |         |          |           |
+----------------------+----------------------+-----------+---------+----------+-----------+
|   lisinopril         | Take 1 tablet by     |   90      | 3       | 20 |  |
| (PRINIVIL,ZESTRIL)   | mouth Daily.         | tablet    |         | 12       | 3         |
| 30 MG tablet         |                      |           |         |          |           |
+----------------------+----------------------+-----------+---------+----------+-----------+
|   loperamide         | Take 2 mg by mouth   |           | 0       | 20 |  |
 
| (ANTI-DIARRHEAL) 2   | Daily as needed.     |           |         | 12       | 4         |
| mg capsule           |                      |           |         |          |           |
+----------------------+----------------------+-----------+---------+----------+-----------+
|   magnesium oxide    | Take 400 mg by mouth |           | 0       | 20 |  |
| (MAG-OX) 400 mg      |  2 times daily.      |           |         | 12       | 3         |
| tablet               |                      |           |         |          |           |
+----------------------+----------------------+-----------+---------+----------+-----------+
|   metoprolol         | Take 25 mg by mouth  |           | 0       | 20 |  |
| tartrate (LOPRESSOR) | 2 times daily.       |           |         | 12       | 3         |
|  25 mg tablet        |                      |           |         |          |           |
+----------------------+----------------------+-----------+---------+----------+-----------+
|   mycophenolate      | Take 250 mg by mouth |           | 0       | 20 | 10/14/201 |
| (CELLCEPT) 250 mg    |  3 times daily.      |           |         | 11       | 5         |
| capsule              |                      |           |         |          |           |
+----------------------+----------------------+-----------+---------+----------+-----------+
|   niacin (NIASPAN)   | Not taking           |           | 0       | 20 |  |
| 500 mg CR tablet     |                      |           |         | 11       | 3         |
+----------------------+----------------------+-----------+---------+----------+-----------+
|   omeprazole         | Take one capsule by  |           | 0       | 20 |  |
| (PRILOSEC) 20 mg     | mouth once daily on  |           |         | 12       | 3         |
| capsule              | an empty stomach     |           |         |          |           |
+----------------------+----------------------+-----------+---------+----------+-----------+
|   predniSONE         | Take 1 tablet by     |   90      | 3       | 20 |  |
| (DELTASONE) 5 mg     | mouth Daily.         | tablet    |         | 12       | 4         |
| tablet               |                      |           |         |          |           |
+----------------------+----------------------+-----------+---------+----------+-----------+
|   Prenatal           | Take 0.8 mg by mouth |           | 0       | 20 |  |
| Multivit-Min-Fe-FA   |  Daily.              |           |         | 12       | 3         |
| (PRENATAL VITAMINS)  |                      |           |         |          |           |
| 0.8 MG TABS          |                      |           |         |          |           |
+----------------------+----------------------+-----------+---------+----------+-----------+
|   rosuvastatin       | Take 20 mg by mouth  |           | 0       |          |  |
| (CRESTOR) 40 MG      | nightly.             |           |         |          | 3         |
| tabletIndications:   |                      |           |         |          |           |
| Unspecified          |                      |           |         |          |           |
| hypertensive kidney  |                      |           |         |          |           |
| disease with chronic |                      |           |         |          |           |
|  kidney disease      |                      |           |         |          |           |
| stage I through      |                      |           |         |          |           |
| stage IV, or         |                      |           |         |          |           |
| unspecified(403.90), |                      |           |         |          |           |
|  Complications of    |                      |           |         |          |           |
| transplanted kidney, |                      |           |         |          |           |
|  Diabetes mellitus   |                      |           |         |          |           |
| type II,             |                      |           |         |          |           |
| uncontrolled (HCC),  |                      |           |         |          |           |
| Hyperlipidemia       |                      |           |         |          |           |
+----------------------+----------------------+-----------+---------+----------+-----------+
|   tacrolimus         | TAD.                 |           | 0       | 20 | 07/15/201 |
| (PROGRAF) 0.5 mg     |                      |           |         | 12       | 4         |
| capsuleIndications:  |                      |           |         |          |           |
| Unspecified          |                      |           |         |          |           |
| hypertensive kidney  |                      |           |         |          |           |
| disease with chronic |                      |           |         |          |           |
|  kidney disease      |                      |           |         |          |           |
| stage I through      |                      |           |         |          |           |
| stage IV, or         |                      |           |         |          |           |
| unspecified(403.90), |                      |           |         |          |           |
|  Complications of    |                      |           |         |          |           |
| transplanted kidney, |                      |           |         |          |           |
 
|  Diabetes mellitus   |                      |           |         |          |           |
| type II,             |                      |           |         |          |           |
| uncontrolled (HCC),  |                      |           |         |          |           |
| Hyperlipidemia       |                      |           |         |          |           |
+----------------------+----------------------+-----------+---------+----------+-----------+
|   tacrolimus         | Take 1 mg by mouth 2 |           | 0       | 20 |  |
| (PROGRAF) 1 mg       |  times daily.        |           |         | 12       | 3         |
| capsuleIndications:  |                      |           |         |          |           |
| Unspecified          |                      |           |         |          |           |
| hypertensive kidney  |                      |           |         |          |           |
| disease with chronic |                      |           |         |          |           |
|  kidney disease      |                      |           |         |          |           |
| stage I through      |                      |           |         |          |           |
| stage IV, or         |                      |           |         |          |           |
| unspecified(403.90), |                      |           |         |          |           |
|  Complications of    |                      |           |         |          |           |
| transplanted kidney, |                      |           |         |          |           |
|  Diabetes mellitus   |                      |           |         |          |           |
| type II,             |                      |           |         |          |           |
| uncontrolled (HCC),  |                      |           |         |          |           |
| Hyperlipidemia       |                      |           |         |          |           |
+----------------------+----------------------+-----------+---------+----------+-----------+
|   valsartan (DIOVAN) | Take 160 mg by mouth |           | 0       | 20 |  |
|  160 mg tablet       |  Daily.              |           |         | 12       | 3         |
+----------------------+----------------------+-----------+---------+----------+-----------+
 documented as of this encounter
 
 Plan of Treatment
 
 
+--------+-----------+------------+----------------------+-------------------+
| Date   | Type      | Specialty  | Care Team            | Description       |
+--------+-----------+------------+----------------------+-------------------+
| / | Office    | Cardiology |   RakeshTonia andre,    |                   |
|    | Visit     |            | [address]   |                   |
|        |           |            | St  [place], [place]  |                   |
|        |           |            | [address]  [phone]  |                   |
|        |           |            |  [phone] (Fax)  |                   |
+--------+-----------+------------+----------------------+-------------------+
| / | Hospital  | Radiology  |   Popeye Townsend, |                   |
|    | Encounter |            |  MD  [address] |                   |
|        |           |            |  St.  [place],   |                   |
|        |           |            | [place] [address]             |                   |
|        |           |            | [phone]         |                   |
|        |           |            | [phone] (Fax)   |                   |
+--------+-----------+------------+----------------------+-------------------+
| / | Surgery   | Radiology  |   Popeye Townsend, | CV EP PPM SYSTEM  |
|    |           |            |  MD  [address] | IMPLANT           |
|        |           |            |  St.  [place],   |                   |
|        |           |            | [place] [address]             |                   |
|        |           |            | [phone]         |                   |
|        |           |            | [phone] (Fax)   |                   |
+--------+-----------+------------+----------------------+-------------------+
| 02/10/ | Clinical  | Cardiology |                      |                   |
|    | Support   |            |                      |                   |
+--------+-----------+------------+----------------------+-------------------+
| / | Office    | Cardiology |   RakeshmaxxalfredoArlena,    |                   |
|    | Visit     |            | POP  [address]   |                   |
|        |           |            | St  [place]  |                   |
|        |           |            | [address]  [phone]  |                   |
 
|        |           |            |  [phone] (Fax)  |                   |
+--------+-----------+------------+----------------------+-------------------+
| / | Off-Site  | Nephrology |   Marzena Moser  |                   |
|    | Visit     |            | DO ETIENNE  [address]      |                   |
|        |           |            | [place], [place] [address]      |                   |
|        |           |            | [place]      |                   |
|        |           |            | [address]  [phone]  |                   |
|        |           |            |  [phone] (Fax)  |                   |
+--------+-----------+------------+----------------------+-------------------+
 documented as of this encounter
 
 Procedures
 
 
+------------------+--------+-------------+----------------------+----------------------+
| Procedure Name   | Priori | Date/Time   | Associated Diagnosis | Comments             |
|                  | ty     |             |                      |                      |
+------------------+--------+-------------+----------------------+----------------------+
| XR CHEST PA AND  | Routin | 2013  |   Cough              |   Results for this   |
| LATERAL          | e      | 10:54 AM    |                      | procedure are in the |
|                  |        | PST         |                      |  results section.    |
+------------------+--------+-------------+----------------------+----------------------+
 documented in this encounter
 
 Results
 XR Chest PA and Lateral (2013 10:54 AM PST)
 
+----------+
| Specimen |
+----------+
|          |
+----------+
 
 
 
+------------------------------------------------------------------------+-----------------+
| Narrative                                                              | Performed At    |
+------------------------------------------------------------------------+-----------------+
|    [facility]      Diagnostic Imaging          |   [place]    |
| Department      [address] [place] [place]      [phone]    | [facility]        |
|                             [   rep ct [address]]     [   rep ct [facility]  |
| st zip]     **** Signed ****                                           | - IMAGING       |
| ______________________________________________________________________ |                 |
| ______________________     Patient Name: RAS HARDY PITA   Physician:     |                 |
| 02     : 1945    Age: 67   Sex: F     Unit #: [id number]    |                 |
|   Exam Date: 13     Acct #: [id number]   Location: [facility]     |                 |
|     Report #: [id number]                      Page:                     |                 |
|   %(RAD)RES..mtdd.print.filter("pg") of   %(RAD)                       |                 |
| RES..mtdd.print.filter("tpg")                                          |                 |
| ______________________________________________________________________ |                 |
| ______________________     Accession Number: [id number]                |                 |
| TWO VIEW CHEST              CLINICAL HISTORY:   COUGH.                 |                 |
| COMPARISON:   2008.              FINDINGS:   There is    |                 |
| stable mild cardiomegaly.   Sternal wires are in unchanged position.   |                 |
|   Aorta shows   intimal calcifications but a stable appearance.        |                 |
|   Pulmonary vasculature is somewhat prominent centrally   but also     |                 |
| unchanged.              No focal areas of abnormal lung density are    |                 |
| seen.   The patient is somewhat rotated on this exam.   No   acute     |                 |
| bony abnormalities.              IMPRESSION:       1. POSTOP           |                 |
| STERNOTOMY WITH STABLE CARDIOMEGALY. NO ACUTE CARDIOPULMONARY          |                 |
 
| ABNORMALITY.              Dictated Date/Time:   2013 10:54       |                 |
| Transcribed Date/Time:   2013 11:03      :       |                 |
|                         <<Signature on File>>                     |                 |
| -----------------------------------------------------------            |                 |
| Jon Marcus      <Electronically signed by       |                 |
| Jon Marcus MD>               Jon Marcus MD 13     |                 |
| 1054     : Kreix Mnkvkewgsaqpl35/14/13 1103          |                 |
|       Abdiel Mireles Jr, MD                                         |                 |
+------------------------------------------------------------------------+-----------------+
 
 
 
+----------------------+---------------------+--------------------+----------------+
| Performing           | Address             | City/State/Zipcode | Phone Number   |
| Organization         |                     |                    |                |
+----------------------+---------------------+--------------------+----------------+
|   [facility]     |   [address] | [place]    |   [phone] |
| [facility]  |                     | [address]              |                |
| - IMAGING            |                     |                    |                |
+----------------------+---------------------+--------------------+----------------+
 documented in this encounter
 
 Visit Diagnoses
 
 
+-----------+
| Diagnosis |
+-----------+
|   Cough   |
+-----------+
 documented in this encounter

## 2020-01-08 NOTE — XMS
Encounter Summary
  Created on: 2020
 
 Viviana Ricci
 External Reference #: [id number]
 : 47
 Sex: Female
 
 Demographics
 
 
+-----------------------+----------------------+
| Address               | [address]      |
|                       | [place]  [address] |
+-----------------------+----------------------+
| Home Phone            | [phone]      |
+-----------------------+----------------------+
| Preferred Language    | Unknown              |
+-----------------------+----------------------+
| Marital Status        | Single               |
+-----------------------+----------------------+
| Confucianist Affiliation | [id number]                 |
+-----------------------+----------------------+
| Race                  | Unknown              |
+-----------------------+----------------------+
| Ethnic Group          | Unknown              |
+-----------------------+----------------------+
 
 
 Author
 
 
+--------------+--------------------------------------------+
| Author       | [facility]  |
|              | and [place]                                |
+--------------+--------------------------------------------+
| Organization | [facility]  |
|              | and [place]                                |
+--------------+--------------------------------------------+
| Address      | Unknown                                    |
+--------------+--------------------------------------------+
| Phone        | Unavailable                                |
+--------------+--------------------------------------------+
 
 
 
 Support
 
 
+----------------+--------------+---------------------+-----------------+
| Name           | Relationship | Address             | Phone           |
+----------------+--------------+---------------------+-----------------+
| Ada/Ed Radhames | ECON         | [address]              | [phone] |
|                |              | [place]  |                 |
|                |              |  [address]              |                 |
+----------------+--------------+---------------------+-----------------+
 
 
 
 
 Care Team Providers
 
 
+------------------------+------+-----------------+
| Care Team Member Name  | Role | Phone           |
+------------------------+------+-----------------+
| Marzena Moser DO | PCP  | [phone] |
+------------------------+------+-----------------+
 
 
 
 Reason for Visit
 
 
+-------------------+----------+
| Reason            | Comments |
+-------------------+----------+
| Medication Refill |          |
+-------------------+----------+
 
 
 
 Encounter Details
 
 
+--------+--------+---------------------+----------------------+-------------------+
| Date   | Type   | Department          | Care Team            | Description       |
+--------+--------+---------------------+----------------------+-------------------+
| / | Refill |   [facility]         |   Marzena Moser  | Medication Refill |
| 2018   |        | NEPHROLOGY  [address]   | M, DO  [address]      |                   |
|        |        | [address]   | [address]      |                   |
|        |        | [place], [place]     | [place], [place]      |                   |
|        |        | [address]          | [address]  [phone]  |                   |
|        |        | [phone]        |  [phone] (Fax)  |                   |
+--------+--------+---------------------+----------------------+-------------------+
 
 
 
 Social History
 
 
+--------------+-------+-----------+--------+------+
| Tobacco Use  | Types | Packs/Day | Years  | Date |
|              |       |           | Used   |      |
+--------------+-------+-----------+--------+------+
| Never Smoker |       |           |        |      |
+--------------+-------+-----------+--------+------+
 
 
 
+---------------------+---+---+---+
| Smokeless Tobacco:  |   |   |   |
| Never Used          |   |   |   |
+---------------------+---+---+---+
 
 
 
+---------------------------------------------------------------+
| Comments: some second hand smoke exposure, but fairly minimal |
 
+---------------------------------------------------------------+
 
 
 
+-------------+-------------+---------+----------+
| Alcohol Use | Drinks/Week | oz/Week | Comments |
+-------------+-------------+---------+----------+
| No          |             |         |          |
+-------------+-------------+---------+----------+
 
 
 
+------------------+---------------+
| Sex Assigned at  | Date Recorded |
| Birth            |               |
+------------------+---------------+
| Not on file      |               |
+------------------+---------------+
 
 
 
+----------------+-------------+-------------+
| Job Start Date | Occupation  | Industry    |
+----------------+-------------+-------------+
| Not on file    | Not on file | Not on file |
+----------------+-------------+-------------+
 
 
 
+----------------+--------------+------------+
| Travel History | Travel Start | Travel End |
+----------------+--------------+------------+
 
 
 
+-------------------------------------+
| No recent travel history available. |
+-------------------------------------+
 documented as of this encounter
 
 Plan of Treatment
 
 
+--------+-----------+------------+----------------------+-------------------+
| Date   | Type      | Specialty  | Care Team            | Description       |
+--------+-----------+------------+----------------------+-------------------+
| / | Office    | Cardiology |   Tonia Wilson,    |                   |
|    | Visit     |            | ARNP  [address]   |                   |
|        |           |            | St [place], [place]  |                   |
|        |           |            | [address]  [phone]  |                   |
|        |           |            |  [phone] (Fax)  |                   |
+--------+-----------+------------+----------------------+-------------------+
| / | Hospital  | Radiology  |   Popeye Townsend, |                   |
|    | Encounter |            |  MD  [address] |                   |
|        |           |            |  [place],   |                   |
|        |           |            | [place] [address]             |                   |
|        |           |            | [phone]         |                   |
|        |           |            | [phone] (Fax)   |                   |
+--------+-----------+------------+----------------------+-------------------+
| / | Surgery   | Radiology  |   Popeye oTwnsend, | CV EP PPM SYSTEM  |
 
|    |           |            |  MD  [address] | IMPLANT           |
|        |           |            |  [place],   |                   |
|        |           |            | [place] [address]             |                   |
|        |           |            | [phone]         |                   |
|        |           |            | [phone] (Fax)   |                   |
+--------+-----------+------------+----------------------+-------------------+
| 02/10/ | Clinical  | Cardiology |                      |                   |
|    | Support   |            |                      |                   |
+--------+-----------+------------+----------------------+-------------------+
| / | Office    | Cardiology |   Tonia iWlson,    |                   |
|    | Visit     |            | ARNP  [address]   |                   |
|        |           |            | [place]  |                   |
|        |           |            | [id number] [phone]  |                   |
|        |           |            |  [phone] (Fax)  |                   |
+--------+-----------+------------+----------------------+-------------------+
| / | Off-Site  | Nephrology |   Marzena Moser  |                   |
|    | Visit     |            | DO ETIENNE  [address] [place] [address]      |                   |
|        |           |            | [address]      |                   |
|        |           |            | [place]      |                   |
|        |           |            | [id number] [phone]  |                   |
|        |           |            |  [phone] (Fax)  |                   |
+--------+-----------+------------+----------------------+-------------------+
 documented as of this encounter
 
 Visit Diagnoses
 
 
+-------------------------------------------+
| Diagnosis                                 |
+-------------------------------------------+
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
+-------------------------------------------+
 documented in this encounter

## 2020-01-08 NOTE — XMS
Encounter Summary
  Created on: 2020
 
 Viviana Ricci
 External Reference #: [id number]
 : 47
 Sex: Female
 
 Demographics
 
 
+-----------------------+----------------------+
| Address               | [address]      |
|                       | [place]  [address] |
+-----------------------+----------------------+
| Home Phone            | [phone]      |
+-----------------------+----------------------+
| Preferred Language    | Unknown              |
+-----------------------+----------------------+
| Marital Status        | Single               |
+-----------------------+----------------------+
| Latter-day Affiliation | 1009                 |
+-----------------------+----------------------+
| Race                  | Unknown              |
+-----------------------+----------------------+
| Ethnic Group          | Unknown              |
+-----------------------+----------------------+
 
 
 Author
 
 
+--------------+--------------------------------------------+
| Author       | [facility]  |
|              | and [place]                                |
+--------------+--------------------------------------------+
| Organization | [facility] [place]  |
|              | and [place]                                |
+--------------+--------------------------------------------+
| Address      | Unknown                                    |
+--------------+--------------------------------------------+
| Phone        | Unavailable                                |
+--------------+--------------------------------------------+
 
 
 
 Support
 
 
+----------------+--------------+---------------------+-----------------+
| Name           | Relationship | Address             | Phone           |
+----------------+--------------+---------------------+-----------------+
| Ada/Ed Radhames | ECON         | [address]              | [phone] |
|                |              | [place]  |                 |
|                |              |  [address]              |                 |
+----------------+--------------+---------------------+-----------------+
 
 
 
 
 Care Team Providers
 
 
+------------------------+------+-----------------+
| Care Team Member Name  | Role | Phone           |
+------------------------+------+-----------------+
| Marzena Moser DO | PCP  | [phone] |
+------------------------+------+-----------------+
 
 
 
 Encounter Details
 
 
+--------+----------+---------------------+----------------------+-------------+
| Date   | Type     | Department          | Care Team            | Description |
+--------+----------+---------------------+----------------------+-------------+
| / | Abstract |   [facility] [place]         |   Marzena Moser  |             |
| 2019   |          | NEPHROLOGY  [address]   | DO ETIENNE  [address]      |             |
|        |          | [address]   | [address]      |             |
|        |          | [place], [place]     | [place], [place]      |             |
|        |          | [address]          | [address]  [phone]  |             |
|        |          | [phone]        |  [phone] (Fax)  |             |
+--------+----------+---------------------+----------------------+-------------+
 
 
 
 Social History
 
 
+--------------+-------+-----------+--------+------+
| Tobacco Use  | Types | Packs/Day | Years  | Date |
|              |       |           | Used   |      |
+--------------+-------+-----------+--------+------+
| Never Smoker |       |           |        |      |
+--------------+-------+-----------+--------+------+
 
 
 
+---------------------+---+---+---+
| Smokeless Tobacco:  |   |   |   |
| Never Used          |   |   |   |
+---------------------+---+---+---+
 
 
 
+---------------------------------------------------------------+
| Comments: some second hand smoke exposure, but fairly minimal |
+---------------------------------------------------------------+
 
 
 
+-------------+-------------+---------+----------+
| Alcohol Use | Drinks/Week | oz/Week | Comments |
+-------------+-------------+---------+----------+
| No          |             |         |          |
+-------------+-------------+---------+----------+
 
 
 
 
+------------------+---------------+
| Sex Assigned at  | Date Recorded |
| Birth            |               |
+------------------+---------------+
| Not on file      |               |
+------------------+---------------+
 
 
 
+----------------+-------------+-------------+
| Job Start Date | Occupation  | Industry    |
+----------------+-------------+-------------+
| Not on file    | Not on file | Not on file |
+----------------+-------------+-------------+
 
 
 
+----------------+--------------+------------+
| Travel History | Travel Start | Travel End |
+----------------+--------------+------------+
 
 
 
+-------------------------------------+
| No recent travel history available. |
+-------------------------------------+
 documented as of this encounter
 
 Plan of Treatment
 
 
+--------+-----------+------------+----------------------+-------------------+
| Date   | Type      | Specialty  | Care Team            | Description       |
+--------+-----------+------------+----------------------+-------------------+
| / | Office    | Cardiology |   Tonia Wilson,    |                   |
|    | Visit     |            | ARNP  [address]   |                   |
|        |           |            | St  [place] [facility] [place]  |                   |
|        |           |            | [address]  [phone]  |                   |
|        |           |            |  [phone] (Fax)  |                   |
+--------+-----------+------------+----------------------+-------------------+
| / | Hospital  | Radiology  |   Popeye Townsend, |                   |
|    | Encounter |            |  MD  [address] |                   |
|        |           |            |  St.  [place],   |                   |
|        |           |            | [place] [address]             |                   |
|        |           |            | [phone]         |                   |
|        |           |            | [phone] (Fax)   |                   |
+--------+-----------+------------+----------------------+-------------------+
| / | Surgery   | Radiology  |   Popeye Townsend, | CV EP PPM SYSTEM  |
|    |           |            |  MD  [address] | IMPLANT           |
|        |           |            |  St.  [place],   |                   |
|        |           |            | [place] [address]             |                   |
|        |           |            | [phone]         |                   |
|        |           |            | [phone] (Fax)   |                   |
+--------+-----------+------------+----------------------+-------------------+
| 02/10/ | Clinical  | Cardiology |                      |                   |
2020   | Support   |            |                      |                   |
+--------+-----------+------------+----------------------+-------------------+
| / | Office    | Cardiology |   Tonia Wilson,    |                   |
|    | Visit     |            | [facility]  [address]   |                   |
 
|        |           |            |   [place]  |                   |
|        |           |            | [address]  [phone]  |                   |
|        |           |            |  [phone] (Fax)  |                   |
+--------+-----------+------------+----------------------+-------------------+
| / | Off-Site  | Nephrology |   Marzena Moser  |                   |
2020   | Visit     |            | DO ETIENNE  [address]      |                   |
|        |           |            | [address]      |                   |
|        |           |            | [place]      |                   |
|        |           |            | [address]  [phone]  |                   |
|        |           |            |  [phone] (Fax)  |                   |
+--------+-----------+------------+----------------------+-------------------+
 documented as of this encounter
 
 Procedures
 
 
+--------------------+--------+------------+----------------------+----------------------+
| Procedure Name     | Priori | Date/Time  | Associated Diagnosis | Comments             |
|                    | ty     |            |                      |                      |
+--------------------+--------+------------+----------------------+----------------------+
| EXTERNAL LAB:      | Routin | 2019 |                      |   Results for this   |
| TACROLIMUS LEVEL,  | e      |            |                      | procedure are in the |
| LC-MS/MS           |        |            |                      |  results section.    |
+--------------------+--------+------------+----------------------+----------------------+
 documented in this encounter
 
 Results
 External Lab: Tacrolimus Level, LC-MS/MS (2019)
 
+-------------+-------+-----------+------------+--------------+
| Component   | Value | Ref Range | Performed  | Pathologist  |
|             |       |           | At         | Signature    |
+-------------+-------+-----------+------------+--------------+
| Tacrolimus, | 5.1   |           |            |              |
|  LC-MS/MS,  |       |           |            |              |
| External    |       |           |            |              |
+-------------+-------+-----------+------------+--------------+
 
 
 
+----------+
| Specimen |
+----------+
|          |
+----------+
 documented in this encounter
 
 Visit Diagnoses
 Not on filedocumented in this encounter

## 2020-01-08 NOTE — XMS
Encounter Summary
  Created on: 2020
 
 Viviana Ricci
 External Reference #: [id number]
 : 47
 Sex: Female
 
 Demographics
 
 
+-----------------------+----------------------+
| Address               | [address]      |
|                       | [place]  [address] |
+-----------------------+----------------------+
| Home Phone            | [phone]      |
+-----------------------+----------------------+
| Preferred Language    | Unknown              |
+-----------------------+----------------------+
| Marital Status        | Single               |
+-----------------------+----------------------+
| Jehovah's witness Affiliation | [id number]                 |
+-----------------------+----------------------+
| Race                  | Unknown              |
+-----------------------+----------------------+
| Ethnic Group          | Unknown              |
+-----------------------+----------------------+
 
 
 Author
 
 
+--------------+--------------------------------------------+
| Author       | [facility]  |
|              | and [place]                                |
+--------------+--------------------------------------------+
| Organization | [facility]  |
|              | and [place]                                |
+--------------+--------------------------------------------+
| Address      | Unknown                                    |
+--------------+--------------------------------------------+
| Phone        | Unavailable                                |
+--------------+--------------------------------------------+
 
 
 
 Support
 
 
+----------------+--------------+---------------------+-----------------+
| Name           | Relationship | Address             | Phone           |
+----------------+--------------+---------------------+-----------------+
| Ada/Ed Radhames | ECON         | [place]              | [phone] |
|                |              | [place]  |                 |
|                |              |  [id number]              |                 |
+----------------+--------------+---------------------+-----------------+
 
 
 
 
 Care Team Providers
 
 
+-----------------------+------+-------------+
| Care Team Member Name | Role | Phone       |
+-----------------------+------+-------------+
 PCP  | Unavailable |
+-----------------------+------+-------------+
 
 
 
 Reason for Visit
 
 
+-------------------+----------+
| Reason            | Comments |
+-------------------+----------+
| Medication Refill |          |
+-------------------+----------+
 
 
 
 Encounter Details
 
 
+--------+--------+---------------------+----------------------+-------------------+
| Date   | Type   | Department          | Care Team            | Description       |
+--------+--------+---------------------+----------------------+-------------------+
| 07/15/ | Refill |   [facility]         |   Marzena Moser  | Medication Refill |
|    |        | NEPHROLOGY  [address]   | M, DO  [address]      |                   |
|        |        | [address]   | [address]      |                   |
|        |        | [place], [place]     | [place], [place]      |                   |
|        |        | [address]          | [address]  [phone]  |                   |
|        |        | [phone]        |  [phone] (Fax)  |                   |
+--------+--------+---------------------+----------------------+-------------------+
 
 
 
 Social History
 
 
+--------------+-------+-----------+--------+------+
| Tobacco Use  | Types | Packs/Day | Years  | Date |
|              |       |           | Used   |      |
+--------------+-------+-----------+--------+------+
| Never Smoker |       |           |        |      |
+--------------+-------+-----------+--------+------+
 
 
 
+---------------------+---+---+---+
| Smokeless Tobacco:  |   |   |   |
| Never Used          |   |   |   |
+---------------------+---+---+---+
 
 
 
+---------------------------------------------------------------+
| Comments: some second hand smoke exposure, but fairly minimal |
 
+---------------------------------------------------------------+
 
 
 
+-------------+-------------+---------+----------+
| Alcohol Use | Drinks/Week | oz/Week | Comments |
+-------------+-------------+---------+----------+
| No          |             |         |          |
+-------------+-------------+---------+----------+
 
 
 
+------------------+---------------+
| Sex Assigned at  | Date Recorded |
| Birth            |               |
+------------------+---------------+
| Not on file      |               |
+------------------+---------------+
 
 
 
+----------------+-------------+-------------+
| Job Start Date | Occupation  | Industry    |
+----------------+-------------+-------------+
| Not on file    | Not on file | Not on file |
+----------------+-------------+-------------+
 
 
 
+----------------+--------------+------------+
| Travel History | Travel Start | Travel End |
+----------------+--------------+------------+
 
 
 
+-------------------------------------+
| No recent travel history available. |
+-------------------------------------+
 documented as of this encounter
 
 Plan of Treatment
 
 
+--------+-----------+------------+----------------------+-------------------+
| Date   | Type      | Specialty  | Care Team            | Description       |
+--------+-----------+------------+----------------------+-------------------+
| / | Office    | Cardiology |   HellalfredoTonia,    |                   |
|    | Visit     |            | ARNP  [address]   |                   |
|        |           |            | St  [place], [place]  |                   |
|        |           |            | [address]  [phone]  |                   |
|        |           |            |  [phone] (Fax)  |                   |
+--------+-----------+------------+----------------------+-------------------+
| / | Hospital  | Radiology  |   Popeye Townsend, |                   |
|    | Encounter |            |  MD  [address] |                   |
|        |           |            |  St.  [place],   |                   |
|        |           |            | [place] [address]             |                   |
|        |           |            | [phone]         |                   |
|        |           |            | [phone] (Fax)   |                   |
+--------+-----------+------------+----------------------+-------------------+
| / | Surgery   | Radiology  |   Popeye Townsend, | CV EP PPM SYSTEM  |
 
|    |           |            |  MD  [address] | IMPLANT           |
|        |           |            |  St.  [place],   |                   |
|        |           |            | [place] [address]             |                   |
|        |           |            | [phone]         |                   |
|        |           |            | [phone] (Fax)   |                   |
+--------+-----------+------------+----------------------+-------------------+
| 02/10/ | Clinical  | Cardiology |                      |                   |
|    | Support   |            |                      |                   |
+--------+-----------+------------+----------------------+-------------------+
| / | Office    | Cardiology |   Tonia Wilson,    |                   |
|    | Visit     |            | ARNP  [address]   |                   |
|        |           |            | [place]  |                   |
|        |           |            | [address]  [phone]  |                   |
|        |           |            |  [phone] (Fax)  |                   |
+--------+-----------+------------+----------------------+-------------------+
| / | Off-Site  | Nephrology |   Marzena Moser  |                   |
|    | Visit     |            | DO ETIENNE  [address] [place] [address]      |                   |
|        |           |            | [address]      |                   |
|        |           |            | [place]      |                   |
|        |           |            | [address]  [phone]  |                   |
|        |           |            |  [phone] (Fax)  |                   |
+--------+-----------+------------+----------------------+-------------------+
 documented as of this encounter
 
 Visit Diagnoses
 
 
+------------------------------------------------------------------------------------------+
| Diagnosis                                                                                |
+------------------------------------------------------------------------------------------+
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
| stage IV, or unspecified(403.90) - Primary  Unspecified hypertensive kidney disease with |
|  chronic kidney disease stage I through stage IV, or unspecified                         |
+------------------------------------------------------------------------------------------+
|   Complications of transplanted kidney                                                   |
+------------------------------------------------------------------------------------------+
|   DIABETES MELLITUS, TYPE II, UNCONTROLLED  Type II or unspecified type diabetes         |
| mellitus without mention of complication, uncontrolled                                   |
+------------------------------------------------------------------------------------------+
|   Hyperlipidemia  Other and unspecified hyperlipidemia                                   |
+------------------------------------------------------------------------------------------+
 documented in this encounter

## 2020-01-08 NOTE — XMS
Encounter Summary
  Created on: 2020
 
 Viviana Ricci
 External Reference #: [id number]
 : 47
 Sex: Female
 
 Demographics
 
 
+-----------------------+----------------------+
| Address               | [address]      |
|                       | [place]  [address] |
+-----------------------+----------------------+
| Home Phone            | [phone]      |
+-----------------------+----------------------+
| Preferred Language    | Unknown              |
+-----------------------+----------------------+
| Marital Status        | Single               |
+-----------------------+----------------------+
| Jehovah's witness Affiliation | 1009                 |
+-----------------------+----------------------+
| Race                  | Unknown              |
+-----------------------+----------------------+
| Ethnic Group          | Unknown              |
+-----------------------+----------------------+
 
 
 Author
 
 
+--------------+--------------------------------------------+
| Author       | [facility]  |
|              | and [place]                                |
+--------------+--------------------------------------------+
| Organization | [facility]  |
|              | and [place]                                |
+--------------+--------------------------------------------+
| Address      | Unknown                                    |
+--------------+--------------------------------------------+
| Phone        | Unavailable                                |
+--------------+--------------------------------------------+
 
 
 
 Support
 
 
+----------------+--------------+---------------------+-----------------+
| Name           | Relationship | Address             | Phone           |
+----------------+--------------+---------------------+-----------------+
| Ada/Ed Radhames | ECON         | [address]              | [phone] |
|                |              | [place]  |                 |
|                |              |  [address]              |                 |
+----------------+--------------+---------------------+-----------------+
 
 
 
 
 Care Team Providers
 
 
+-----------------------+------+-------------+
| Care Team Member Name | Role | Phone       |
+-----------------------+------+-------------+
 PCP  | Unavailable |
+-----------------------+------+-------------+
 
 
 
 Encounter Details
 
 
+--------+-----------+----------------------+---------------------+-------------+
| Date   | Type      | Department           | Care Team           | Description |
+--------+-----------+----------------------+---------------------+-------------+
| / | [facility]  |   [facility] |   Guillaume Arzate  |             |
|    | Encounter |  MED CTR SLEEP       | MD Jean Pierre  [address]   |             |
|        |           | [address] [place] | [place]   [place]    |             |
|        |           |   [place]    | [place] [address]     |             |
|        |           | [address]           | [phone]        |             |
|        |           | [phone]         | [phone] (Fax)  |             |
+--------+-----------+----------------------+---------------------+-------------+
 
 
 
 Social History
 
 
+----------------+-------+-----------+--------+------+
| Tobacco Use    | Types | Packs/Day | Years  | Date |
|                |       |           | Used   |      |
+----------------+-------+-----------+--------+------+
| Never Assessed |       |           |        |      |
+----------------+-------+-----------+--------+------+
 
 
 
+------------------+---------------+
| Sex Assigned at  | Date Recorded |
| Birth            |               |
+------------------+---------------+
| Not on file      |               |
+------------------+---------------+
 
 
 
+----------------+-------------+-------------+
| Job Start Date | Occupation  | Industry    |
+----------------+-------------+-------------+
| Not on file    | Not on file | Not on file |
+----------------+-------------+-------------+
 
 
 
+----------------+--------------+------------+
| Travel History | Travel Start | Travel End |
+----------------+--------------+------------+
 
 
 
 
+-------------------------------------+
| No recent travel history available. |
+-------------------------------------+
 documented as of this encounter
 
 Plan of Treatment
 
 
+--------+-----------+------------+----------------------+-------------------+
| Date   | Type      | Specialty  | Care Team            | Description       |
+--------+-----------+------------+----------------------+-------------------+
| / | Office    | Cardiology |   Tonia Wilson,    |                   |
|    | Visit     |            | [address]   |                   |
|        |           |            | St  [place], [place]  |                   |
|        |           |            | [address]  [phone]  |                   |
|        |           |            |  [phone] (Fax)  |                   |
+--------+-----------+------------+----------------------+-------------------+
| / | Hospital  | Radiology  |   Popeye Townsend, |                   |
|    | Encounter |            |  MD  [address] [place] |                   |
|        |           |            |  [place],   |                   |
|        |           |            | [place] [address]             |                   |
|        |           |            | [phone]         |                   |
|        |           |            | [phone] (Fax)   |                   |
+--------+-----------+------------+----------------------+-------------------+
| / | Surgery   | Radiology  |   Popeye Townsend, | CV EP PPM SYSTEM  |
|    |           |            |  MD  [address] | IMPLANT           |
|        |           |            |  St.  [place],   |                   |
|        |           |            | [place] [address]             |                   |
|        |           |            | [phone]         |                   |
|        |           |            | [phone] (Fax)   |                   |
+--------+-----------+------------+----------------------+-------------------+
| 02/10/ | Clinical  | Cardiology |                      |                   |
|    | Support   |            |                      |                   |
+--------+-----------+------------+----------------------+-------------------+
| / | Office    | Cardiology |   Tonia Wilson,    |                   |
|    | Visit     |            | BELKIS  [address]   |                   |
|        |           |            | [place]  |                   |
|        |           |            | [address]  [phone]  |                   |
|        |           |            |  [phone] (Fax)  |                   |
+--------+-----------+------------+----------------------+-------------------+
| / | Off-Site  | Nephrology |   Marzena Moser  |                   |
|    | Visit     |            | DO ETIENNE  [address] [place] [address]      |                   |
|        |           |            | [address]      |                   |
|        |           |            | [place]      |                   |
|        |           |            | [address] [phone]  |                   |
|        |           |            |  [phone] (Fax)  |                   |
+--------+-----------+------------+----------------------+-------------------+
 documented as of this encounter
 
 Visit Diagnoses
 Not on filedocumented in this encounter

## 2020-01-08 NOTE — XMS
Encounter Summary
  Created on: 2020
 
 Viviana Ricci
 External Reference #: [id number]
 : 47
 Sex: Female
 
 Demographics
 
 
+-----------------------+----------------------+
| Address               | [address]      |
|                       | [place]  [address] |
+-----------------------+----------------------+
| Home Phone            | [phone]      |
+-----------------------+----------------------+
| Preferred Language    | Unknown              |
+-----------------------+----------------------+
| Marital Status        | Single               |
+-----------------------+----------------------+
| Advent Affiliation | 1009                 |
+-----------------------+----------------------+
| Race                  | Unknown              |
+-----------------------+----------------------+
| Ethnic Group          | Unknown              |
+-----------------------+----------------------+
 
 
 Author
 
 
+--------------+--------------------------------------------+
| Author       | [facility]  |
|              | and [facility]                                |
+--------------+--------------------------------------------+
| Organization | [facility]  |
|              | and [facility]                                |
+--------------+--------------------------------------------+
| Address      | Unknown                                    |
+--------------+--------------------------------------------+
| Phone        | Unavailable                                |
+--------------+--------------------------------------------+
 
 
 
 Support
 
 
+----------------+--------------+---------------------+-----------------+
| Name           | Relationship | Address             | Phone           |
+----------------+--------------+---------------------+-----------------+
| Ada/Ed Radhames | ECON         | [address]              | [phone] |
|                |              | [place]  |                 |
|                |              |  [address]              |                 |
+----------------+--------------+---------------------+-----------------+
 
 
 
 
 Care Team Providers
 
 
+-----------------------+------+-------------+
| Care Team Member Name | Role | Phone       |
+-----------------------+------+-------------+
 PCP  | Unavailable |
+-----------------------+------+-------------+
 
 
 
 Encounter Details
 
 
+--------+----------+---------------------+----------------------+-------------+
| Date   | Type     | Department          | Care Team            | Description |
+--------+----------+---------------------+----------------------+-------------+
| 09/15/ | Abstract |   [facility] [place]         |   Marzena Moser  |             |
| 2017   |          | NEPHROLOGY  [address]   | M, DO  [address]      |             |
|        |          | [address]   | [address]      |             |
|        |          | [place], [place]     | [place]      |             |
|        |          | [address]          | [address]  [phone]  |             |
|        |          | [phone]        |  [phone] (Fax)  |             |
+--------+----------+---------------------+----------------------+-------------+
 
 
 
 Social History
 
 
+--------------+-------+-----------+--------+------+
| Tobacco Use  | Types | Packs/Day | Years  | Date |
|              |       |           | Used   |      |
+--------------+-------+-----------+--------+------+
| Never Smoker |       |           |        |      |
+--------------+-------+-----------+--------+------+
 
 
 
+---------------------+---+---+---+
| Smokeless Tobacco:  |   |   |   |
| Never Used          |   |   |   |
+---------------------+---+---+---+
 
 
 
+---------------------------------------------------------------+
| Comments: some second hand smoke exposure, but fairly minimal |
+---------------------------------------------------------------+
 
 
 
+-------------+-------------+---------+----------+
| Alcohol Use | Drinks/Week | oz/Week | Comments |
+-------------+-------------+---------+----------+
| No          |             |         |          |
+-------------+-------------+---------+----------+
 
 
 
 
+------------------+---------------+
| Sex Assigned at  | Date Recorded |
| Birth            |               |
+------------------+---------------+
| Not on file      |               |
+------------------+---------------+
 
 
 
+----------------+-------------+-------------+
| Job Start Date | Occupation  | Industry    |
+----------------+-------------+-------------+
| Not on file    | Not on file | Not on file |
+----------------+-------------+-------------+
 
 
 
+----------------+--------------+------------+
| Travel History | Travel Start | Travel End |
+----------------+--------------+------------+
 
 
 
+-------------------------------------+
| No recent travel history available. |
+-------------------------------------+
 documented as of this encounter
 
 Progress Notes
 Juju Glass RN - 09/15/2017  8:57 AM PDTUrinalysis with 30 WBC, 3+ bacteria, 25 leukoc
ytes. Patient denies dysuria, fever or chills. Instructed to notify our office if she develo
ps any of these symptoms, she verbalized understanding.Electronically signed by Juju shankar RN at 09/15/2017  9:12 AM PDTdocumented in this encounter
 
 Plan of Treatment
 
 
+--------+-----------+------------+----------------------+-------------------+
| Date   | Type      | Specialty  | Care Team            | Description       |
+--------+-----------+------------+----------------------+-------------------+
| / | Office    | Cardiology |   Tonia Wilson,    |                   |
| 2020   | Visit     |            | ARNP  [address]   |                   |
|        |           |            | St  [place], [place]  |                   |
|        |           |            | [address]  [phone]  |                   |
|        |           |            |  [phone] (Fax)  |                   |
+--------+-----------+------------+----------------------+-------------------+
| / | Hospital  | Radiology  |   Popeye Townsend, |                   |
|    | Encounter |            |  MD  [address] |                   |
|        |           |            |  [place],   |                   |
|        |           |            | [place] [address]             |                   |
|        |           |            | [phone]         |                   |
|        |           |            | [phone] (Fax)   |                   |
+--------+-----------+------------+----------------------+-------------------+
| / | Surgery   | Radiology  |   Popeye Townsend, | CV EP PPM SYSTEM  |
|    |           |            |  MD  [address] | IMPLANT           |
|        |           |            |  [place],   |                   |
|        |           |            | [place] [address]             |                   |
|        |           |            | [phone]         |                   |
|        |           |            | [phone] (Fax)   |                   |
 
+--------+-----------+------------+----------------------+-------------------+
| 02/10/ | Clinical  | Cardiology |                      |                   |
|    | Support   |            |                      |                   |
+--------+-----------+------------+----------------------+-------------------+
| / | Office    | Cardiology |   Tonia Wilson,    |                   |
|    | Visit     |            | [facility]  [address]   |                   |
|        |           |            |   [place]  |                   |
|        |           |            | [address]  [phone]  |                   |
|        |           |            |  [phone] (Fax)  |                   |
+--------+-----------+------------+----------------------+-------------------+
| / | Off-Site  | Nephrology |   Marzena Moser  |                   |
|    | Visit     |            | DO [facility]  [address] [place] [address]      |                   |
|        |           |            | [address]      |                   |
|        |           |            | [place]      |                   |
|        |           |            | [address]  [phone]  |                   |
|        |           |            |  [phone] (Fax)  |                   |
+--------+-----------+------------+----------------------+-------------------+
 documented as of this encounter
 
 Procedures
 
 
+----------------------+--------+------------+----------------------+----------------------+
| Procedure Name       | Priori | Date/Time  | Associated Diagnosis | Comments             |
|                      | ty     |            |                      |                      |
+----------------------+--------+------------+----------------------+----------------------+
| EXTERNAL LAB: ALMA    | Routin | 2017 |                      |   Results for this   |
|                      | e      |            |                      | procedure are in the |
|                      |        |            |                      |  results section.    |
+----------------------+--------+------------+----------------------+----------------------+
| EXTERNAL LAB:        | Routin | 2017 |                      |   Results for this   |
| GLUCOSE              | e      |            |                      | procedure are in the |
|                      |        |            |                      |  results section.    |
+----------------------+--------+------------+----------------------+----------------------+
| EXTERNAL LAB: ALT    | Routin | 2017 |                      |   Results for this   |
|                      | e      |            |                      | procedure are in the |
|                      |        |            |                      |  results section.    |
+----------------------+--------+------------+----------------------+----------------------+
| EXTERNAL LAB: ELOY    | Routin | 2017 |                      |   Results for this   |
|                      | e      |            |                      | procedure are in the |
|                      |        |            |                      |  results section.    |
+----------------------+--------+------------+----------------------+----------------------+
| EXTERNAL LAB:        | Routin | 2017 |                      |   Results for this   |
| ALKALINE PHOSPHATASE | e      |            |                      | procedure are in the |
|                      |        |            |                      |  results section.    |
+----------------------+--------+------------+----------------------+----------------------+
| EXTERNAL LAB:        | Routin | 2017 |                      |   Results for this   |
| BILIRUBIN, TOTAL     | e      |            |                      | procedure are in the |
|                      |        |            |                      |  results section.    |
+----------------------+--------+------------+----------------------+----------------------+
| EXTERNAL LAB:        | Routin | 2017 |                      |   Results for this   |
| ALBUMIN              | e      |            |                      | procedure are in the |
|                      |        |            |                      |  results section.    |
+----------------------+--------+------------+----------------------+----------------------+
| EXTERNAL LAB:        | Routin | 2017 |                      |   Results for this   |
| PROTEIN, TOTAL       | e      |            |                      | procedure are in the |
|                      |        |            |                      |  results section.    |
+----------------------+--------+------------+----------------------+----------------------+
| EXTERNAL LAB:        | Routin | 2017 |                      |   Results for this   |
| PHOSPHORUS           | e      |            |                      | procedure are in the |
 
|                      |        |            |                      |  results section.    |
+----------------------+--------+------------+----------------------+----------------------+
| EXTERNAL LAB:        | Routin | 2017 |                      |   Results for this   |
| MAGNESIUM            | e      |            |                      | procedure are in the |
|                      |        |            |                      |  results section.    |
+----------------------+--------+------------+----------------------+----------------------+
| EXTERNAL LAB:        | Routin | 2017 |                      |   Results for this   |
| CALCIUM              | e      |            |                      | procedure are in the |
|                      |        |            |                      |  results section.    |
+----------------------+--------+------------+----------------------+----------------------+
| EXTERNAL LAB: CARBON | Routin | 2017 |                      |   Results for this   |
|  DIOXIDE             | e      |            |                      | procedure are in the |
|                      |        |            |                      |  results section.    |
+----------------------+--------+------------+----------------------+----------------------+
| EXTERNAL LAB:        | Routin | 2017 |                      |   Results for this   |
| CHLORIDE             | e      |            |                      | procedure are in the |
|                      |        |            |                      |  results section.    |
+----------------------+--------+------------+----------------------+----------------------+
| EXTERNAL LAB:        | Routin | 2017 |                      |   Results for this   |
| POTASSIUM            | e      |            |                      | procedure are in the |
|                      |        |            |                      |  results section.    |
+----------------------+--------+------------+----------------------+----------------------+
| EXTERNAL LAB: SODIUM | Routin | 2017 |                      |   Results for this   |
|                      | e      |            |                      | procedure are in the |
|                      |        |            |                      |  results section.    |
+----------------------+--------+------------+----------------------+----------------------+
| EXTERNAL LAB:        | Routin | 2017 |                      |   Results for this   |
| URINALYSIS           | e      |            |                      | procedure are in the |
|                      |        |            |                      |  results section.    |
+----------------------+--------+------------+----------------------+----------------------+
| EXTERNAL LAB: CBC    | Routin | 2017 |                      |   Results for this   |
|                      | e      |            |                      | procedure are in the |
|                      |        |            |                      |  results section.    |
+----------------------+--------+------------+----------------------+----------------------+
| EXTERNAL LAB: EGFR   | Routin | 2017 |                      |   Results for this   |
|                      | e      |            |                      | procedure are in the |
|                      |        |            |                      |  results section.    |
+----------------------+--------+------------+----------------------+----------------------+
| EXTERNAL LAB:        | Routin | 2017 |                      |   Results for this   |
| CREATININE           | e      |            |                      | procedure are in the |
|                      |        |            |                      |  results section.    |
+----------------------+--------+------------+----------------------+----------------------+
| URINALYSIS           | Routin | 2017 |                      |   Results for this   |
|                      | e      |            |                      | procedure are in the |
|                      |        |            |                      |  results section.    |
+----------------------+--------+------------+----------------------+----------------------+
| HEMOGLOBIN A1C       | Routin | 2017 |                      |   Results for this   |
|                      | e      |            |                      | procedure are in the |
|                      |        |            |                      |  results section.    |
+----------------------+--------+------------+----------------------+----------------------+
 documented in this encounter
 
 Results
 Urinalysis (2017)
 
+-------------+--------------+----------------+------------+--------------+
| Component   | Value        | Ref Range      | Performed  | Pathologist  |
|             |              |                | At         | Signature    |
+-------------+--------------+----------------+------------+--------------+
| WBC UA      | 30 (A)       | 0 - 4 /HPF     |            |              |
 
+-------------+--------------+----------------+------------+--------------+
| BACTERIA UA | 3+ (A)       | Negative /HPF  |            |              |
+-------------+--------------+----------------+------------+--------------+
| Color,      | Light Yellow | Light Yellow,  |            |              |
| Urine       |              | Yellow         |            |              |
+-------------+--------------+----------------+------------+--------------+
| Clarity     | Clear        |                |            |              |
+-------------+--------------+----------------+------------+--------------+
 
 
 
+----------+
| Specimen |
+----------+
| Urine    |
+----------+
 Hemoglobin A1C (2017)
 
+-------------+-------+-----------+------------+--------------+
| Component   | Value | Ref Range | Performed  | Pathologist  |
|             |       |           | At         | Signature    |
+-------------+-------+-----------+------------+--------------+
| Hemoglobin  | 7.7   |           | EXTERNAL   |              |
| A1c,        |       |           | LAB        |              |
| external    |       |           |            |              |
+-------------+-------+-----------+------------+--------------+
 
 
 
+----------+
| Specimen |
+----------+
| Blood    |
+----------+
 
 
 
+--------------------------+
| Resulting Agency Comment |
+--------------------------+
|   [facility]              |
+--------------------------+
 
 
 
+----------------+---------+--------------------+--------------+
| Performing     | Address | City/State/Zipcode | Phone Number |
| Organization   |         |                    |              |
+----------------+---------+--------------------+--------------+
|   EXTERNAL LAB |         |                    |              |
+----------------+---------+--------------------+--------------+
 External Lab: ALMA (2017)
 
+-----------+--------+-----------+------------+--------------+
| Component | Value  | Ref Range | Performed  | Pathologist  |
|           |        |           | At         | Signature    |
+-----------+--------+-----------+------------+--------------+
| BUN,      | 29 (A) | 6 - 23    | EXTERNAL   |              |
| External  |        |           | LAB        |              |
+-----------+--------+-----------+------------+--------------+
 
 
 
 
+--------------------------+
| Resulting Agency Comment |
+--------------------------+
|   [facility]              |
+--------------------------+
 
 
 
+----------------+---------+--------------------+--------------+
| Performing     | Address | City/State/Zipcode | Phone Number |
| Organization   |         |                    |              |
+----------------+---------+--------------------+--------------+
|   EXTERNAL LAB |         |                    |              |
+----------------+---------+--------------------+--------------+
 External Lab: Glucose (2017)
 
+-----------+---------+-----------+------------+--------------+
| Component | Value   | Ref Range | Performed  | Pathologist  |
|           |         |           | At         | Signature    |
+-----------+---------+-----------+------------+--------------+
| Glucose,  | 110 (A) | 70 - 100  | EXTERNAL   |              |
| External  |         |           | LAB        |              |
+-----------+---------+-----------+------------+--------------+
 
 
 
+--------------------------+
| Resulting Agency Comment |
+--------------------------+
|   [facility]              |
+--------------------------+
 
 
 
+----------------+---------+--------------------+--------------+
| Performing     | Address | City/State/Zipcode | Phone Number |
| Organization   |         |                    |              |
+----------------+---------+--------------------+--------------+
|   EXTERNAL LAB |         |                    |              |
+----------------+---------+--------------------+--------------+
 External Lab: ALT (2017)
 
+-----------+-------+-----------+------------+--------------+
| Component | Value | Ref Range | Performed  | Pathologist  |
|           |       |           | At         | Signature    |
+-----------+-------+-----------+------------+--------------+
| ALT,      | 15    | 7 - 52    | EXTERNAL   |              |
| External  |       |           | LAB        |              |
+-----------+-------+-----------+------------+--------------+
 
 
 
+--------------------------+
| Resulting Agency Comment |
+--------------------------+
|   [facility]              |
+--------------------------+
 
 
 
 
+----------------+---------+--------------------+--------------+
| Performing     | Address | City/State/Zipcode | Phone Number |
| Organization   |         |                    |              |
+----------------+---------+--------------------+--------------+
|   EXTERNAL LAB |         |                    |              |
+----------------+---------+--------------------+--------------+
 External Lab: AST (2017)
 
+-----------+-------+-----------+------------+--------------+
| Component | Value | Ref Range | Performed  | Pathologist  |
|           |       |           | At         | Signature    |
+-----------+-------+-----------+------------+--------------+
| AST,      | 24    | 13 - 39   | EXTERNAL   |              |
| External  |       |           | LAB        |              |
+-----------+-------+-----------+------------+--------------+
 
 
 
+--------------------------+
| Resulting Agency Comment |
+--------------------------+
|   [facility]              |
+--------------------------+
 
 
 
+----------------+---------+--------------------+--------------+
| Performing     | Address | City/State/Zipcode | Phone Number |
| Organization   |         |                    |              |
+----------------+---------+--------------------+--------------+
|   EXTERNAL LAB |         |                    |              |
+----------------+---------+--------------------+--------------+
 External Lab: Alkaline Phosphatase (2017)
 
+-----------+-------+-----------+------------+--------------+
| Component | Value | Ref Range | Performed  | Pathologist  |
|           |       |           | At         | Signature    |
+-----------+-------+-----------+------------+--------------+
| ALP,      | 106   | 31 - 130  | EXTERNAL   |              |
| External  |       |           | LAB        |              |
+-----------+-------+-----------+------------+--------------+
 
 
 
+--------------------------+
| Resulting Agency Comment |
+--------------------------+
|   [facility]              |
+--------------------------+
 
 
 
+----------------+---------+--------------------+--------------+
| Performing     | Address | City/State/Zipcode | Phone Number |
| Organization   |         |                    |              |
+----------------+---------+--------------------+--------------+
|   EXTERNAL LAB |         |                    |              |
 
+----------------+---------+--------------------+--------------+
 External Lab: Bilirubin, Total (2017)
 
+-------------+-------+-----------+------------+--------------+
| Component   | Value | Ref Range | Performed  | Pathologist  |
|             |       |           | At         | Signature    |
+-------------+-------+-----------+------------+--------------+
| Bilirubin,  | 0.7   | 0 - 1.2   | EXTERNAL   |              |
| Total,      |       |           | LAB        |              |
| External    |       |           |            |              |
+-------------+-------+-----------+------------+--------------+
 
 
 
+--------------------------+
| Resulting Agency Comment |
+--------------------------+
|   [facility]              |
+--------------------------+
 
 
 
+----------------+---------+--------------------+--------------+
| Performing     | Address | City/State/Zipcode | Phone Number |
| Organization   |         |                    |              |
+----------------+---------+--------------------+--------------+
|   EXTERNAL LAB |         |                    |              |
+----------------+---------+--------------------+--------------+
 External Lab: Albumin (2017)
 
+-----------+-------+-----------+------------+--------------+
| Component | Value | Ref Range | Performed  | Pathologist  |
|           |       |           | At         | Signature    |
+-----------+-------+-----------+------------+--------------+
| Albumin,  | 3.6   | 3.5 - 5   | EXTERNAL   |              |
| External  |       |           | LAB        |              |
+-----------+-------+-----------+------------+--------------+
 
 
 
+--------------------------+
| Resulting Agency Comment |
+--------------------------+
|   [facility]              |
+--------------------------+
 
 
 
+----------------+---------+--------------------+--------------+
| Performing     | Address | City/State/Zipcode | Phone Number |
| Organization   |         |                    |              |
+----------------+---------+--------------------+--------------+
|   EXTERNAL LAB |         |                    |              |
+----------------+---------+--------------------+--------------+
 External Lab: Protein, Total (2017)
 
+-----------+---------+-----------+------------+--------------+
| Component | Value   | Ref Range | Performed  | Pathologist  |
|           |         |           | At         | Signature    |
+-----------+---------+-----------+------------+--------------+
 
| Protein,  | 5.8 (A) | 6 - 8     | EXTERNAL   |              |
| Total,    |         |           | LAB        |              |
| External  |         |           |            |              |
+-----------+---------+-----------+------------+--------------+
 
 
 
+--------------------------+
| Resulting Agency Comment |
+--------------------------+
|   [facility]              |
+--------------------------+
 
 
 
+----------------+---------+--------------------+--------------+
| Performing     | Address | City/State/Zipcode | Phone Number |
| Organization   |         |                    |              |
+----------------+---------+--------------------+--------------+
|   EXTERNAL LAB |         |                    |              |
+----------------+---------+--------------------+--------------+
 External Lab: Phosphorus (2017)
 
+-------------+---------+-----------+------------+--------------+
| Component   | Value   | Ref Range | Performed  | Pathologist  |
|             |         |           | At         | Signature    |
+-------------+---------+-----------+------------+--------------+
| Phosphorus, | 2.4 (A) | 2.5 - 5   | EXTERNAL   |              |
|  External   |         |           | LAB        |              |
+-------------+---------+-----------+------------+--------------+
 
 
 
+--------------------------+
| Resulting Agency Comment |
+--------------------------+
|   [facility]              |
+--------------------------+
 
 
 
+----------------+---------+--------------------+--------------+
| Performing     | Address | City/State/Zipcode | Phone Number |
| Organization   |         |                    |              |
+----------------+---------+--------------------+--------------+
|   EXTERNAL LAB |         |                    |              |
+----------------+---------+--------------------+--------------+
 External Lab: Magnesium (2017)
 
+-------------+-------+-----------+------------+--------------+
| Component   | Value | Ref Range | Performed  | Pathologist  |
|             |       |           | At         | Signature    |
+-------------+-------+-----------+------------+--------------+
| Magnesium,  | 1.7   | 1.7 - 2.5 | EXTERNAL   |              |
| External    |       |           | LAB        |              |
+-------------+-------+-----------+------------+--------------+
 
 
 
+--------------------------+
 
| Resulting Agency Comment |
+--------------------------+
|   [facility]              |
+--------------------------+
 
 
 
+----------------+---------+--------------------+--------------+
| Performing     | Address | City/State/Zipcode | Phone Number |
| Organization   |         |                    |              |
+----------------+---------+--------------------+--------------+
|   EXTERNAL LAB |         |                    |              |
+----------------+---------+--------------------+--------------+
 External Lab: Calcium (2017)
 
+-----------+-------+-------------+------------+--------------+
| Component | Value | Ref Range   | Performed  | Pathologist  |
|           |       |             | At         | Signature    |
+-----------+-------+-------------+------------+--------------+
| Calcium,  | 9.7   | 8.4 - 10.25 | EXTERNAL   |              |
| External  |       |             | LAB        |              |
+-----------+-------+-------------+------------+--------------+
 
 
 
+--------------------------+
| Resulting Agency Comment |
+--------------------------+
|   [facility]              |
+--------------------------+
 
 
 
+----------------+---------+--------------------+--------------+
| Performing     | Address | City/State/Zipcode | Phone Number |
| Organization   |         |                    |              |
+----------------+---------+--------------------+--------------+
|   EXTERNAL LAB |         |                    |              |
+----------------+---------+--------------------+--------------+
 External Lab: Carbon Dioxide (2017)
 
+-----------+-------+-----------+------------+--------------+
| Component | Value | Ref Range | Performed  | Pathologist  |
|           |       |           | At         | Signature    |
+-----------+-------+-----------+------------+--------------+
| Carbon    | 19    | 19 - 31   | EXTERNAL   |              |
| Dioxide,  |       |           | LAB        |              |
| External  |       |           |            |              |
+-----------+-------+-----------+------------+--------------+
 
 
 
+--------------------------+
| Resulting Agency Comment |
+--------------------------+
|   [facility]              |
+--------------------------+
 
 
 
 
+----------------+---------+--------------------+--------------+
| Performing     | Address | City/State/Zipcode | Phone Number |
| Organization   |         |                    |              |
+----------------+---------+--------------------+--------------+
|   EXTERNAL LAB |         |                    |              |
+----------------+---------+--------------------+--------------+
 External Lab: Chloride (2017)
 
+------------+-------+-----------+------------+--------------+
| Component  | Value | Ref Range | Performed  | Pathologist  |
|            |       |           | At         | Signature    |
+------------+-------+-----------+------------+--------------+
| Chloride,  | 111   | 95 - 112  | EXTERNAL   |              |
| External   |       |           | LAB        |              |
+------------+-------+-----------+------------+--------------+
 
 
 
+--------------------------+
| Resulting Agency Comment |
+--------------------------+
|   [facility]              |
+--------------------------+
 
 
 
+----------------+---------+--------------------+--------------+
| Performing     | Address | City/State/Zipcode | Phone Number |
| Organization   |         |                    |              |
+----------------+---------+--------------------+--------------+
|   EXTERNAL LAB |         |                    |              |
+----------------+---------+--------------------+--------------+
 External Lab: Potassium (2017)
 
+-------------+-------+-----------+------------+--------------+
| Component   | Value | Ref Range | Performed  | Pathologist  |
|             |       |           | At         | Signature    |
+-------------+-------+-----------+------------+--------------+
| Potassium,  | 4.4   | 3.6 - 5.1 | EXTERNAL   |              |
| External    |       |           | LAB        |              |
+-------------+-------+-----------+------------+--------------+
 
 
 
+--------------------------+
| Resulting Agency Comment |
+--------------------------+
|   [facility]              |
+--------------------------+
 
 
 
+----------------+---------+--------------------+--------------+
| Performing     | Address | City/State/Zipcode | Phone Number |
| Organization   |         |                    |              |
+----------------+---------+--------------------+--------------+
|   EXTERNAL LAB |         |                    |              |
+----------------+---------+--------------------+--------------+
 External Lab: Sodium (2017)
 
 
+-----------+-------+-----------+------------+--------------+
| Component | Value | Ref Range | Performed  | Pathologist  |
|           |       |           | At         | Signature    |
+-----------+-------+-----------+------------+--------------+
| Sodium,   | 142   | 132 - 143 | EXTERNAL   |              |
| External  |       |           | LAB        |              |
+-----------+-------+-----------+------------+--------------+
 
 
 
+--------------------------+
| Resulting Agency Comment |
+--------------------------+
|   [facility]              |
+--------------------------+
 
 
 
+----------------+---------+--------------------+--------------+
| Performing     | Address | City/State/Zipcode | Phone Number |
| Organization   |         |                    |              |
+----------------+---------+--------------------+--------------+
|   EXTERNAL LAB |         |                    |              |
+----------------+---------+--------------------+--------------+
 External Lab: Urinalysis (2017)
 
+-------------+----------+-----------+------------+--------------+
| Component   | Value    | Ref Range | Performed  | Pathologist  |
|             |          |           | At         | Signature    |
+-------------+----------+-----------+------------+--------------+
| UA Blood,   | negative |           | EXTERNAL   |              |
| External    |          |           | LAB        |              |
+-------------+----------+-----------+------------+--------------+
| UA Glucose, | normal   |           | EXTERNAL   |              |
|  External   |          |           | LAB        |              |
+-------------+----------+-----------+------------+--------------+
| UA Ketones, | negative |           | EXTERNAL   |              |
|  External   |          |           | LAB        |              |
+-------------+----------+-----------+------------+--------------+
| UA Ph,      | 6        |           | EXTERNAL   |              |
| External    |          |           | LAB        |              |
+-------------+----------+-----------+------------+--------------+
| UA          | 25       |           | EXTERNAL   |              |
| Proteins,   |          |           | LAB        |              |
| External    |          |           |            |              |
+-------------+----------+-----------+------------+--------------+
| UA RBC,     | 0        |           | EXTERNAL   |              |
| External    |          |           | LAB        |              |
+-------------+----------+-----------+------------+--------------+
| UA Specific | 1.014    |           | EXTERNAL   |              |
|  Gravity,   |          |           | LAB        |              |
| External    |          |           |            |              |
+-------------+----------+-----------+------------+--------------+
| UA          | 25       |           | EXTERNAL   |              |
| Leukocyte   |          |           | LAB        |              |
| Esterase,   |          |           |            |              |
| External    |          |           |            |              |
+-------------+----------+-----------+------------+--------------+
 
 
 
 
+--------------------------+
| Resulting Agency Comment |
+--------------------------+
|   [facility]              |
+--------------------------+
 
 
 
+----------------+---------+--------------------+--------------+
| Performing     | Address | City/State/Zipcode | Phone Number |
| Organization   |         |                    |              |
+----------------+---------+--------------------+--------------+
|   EXTERNAL LAB |         |                    |              |
+----------------+---------+--------------------+--------------+
 External Lab: CBC (2017)
 
+-----------+---------+-----------+------------+--------------+
| Component | Value   | Ref Range | Performed  | Pathologist  |
|           |         |           | At         | Signature    |
+-----------+---------+-----------+------------+--------------+
| WBC,      | 5.3     | 4.5 - 11  | EXTERNAL   |              |
| External  |         |           | LAB        |              |
+-----------+---------+-----------+------------+--------------+
| HGB,      | 13.8    | 12 - 16   | EXTERNAL   |              |
| External  |         |           | LAB        |              |
+-----------+---------+-----------+------------+--------------+
| HCT,      | 41.8    | 35 - 45   | EXTERNAL   |              |
| External  |         |           | LAB        |              |
+-----------+---------+-----------+------------+--------------+
| PLT,      | 136 (A) | 140 - 440 | EXTERNAL   |              |
| External  |         |           | LAB        |              |
+-----------+---------+-----------+------------+--------------+
| RBC,      | 4.06    | 3.8 - 5.1 | EXTERNAL   |              |
| External  |         |           | LAB        |              |
+-----------+---------+-----------+------------+--------------+
| MCV,      | 103 (A) | 81 - 99   | EXTERNAL   |              |
| External  |         |           | LAB        |              |
+-----------+---------+-----------+------------+--------------+
| RDW,      | 14      | 10.5 - 15 | EXTERNAL   |              |
| External  |         |           | LAB        |              |
+-----------+---------+-----------+------------+--------------+
 
 
 
+--------------------------+
| Resulting Agency Comment |
+--------------------------+
|   [facility]              |
+--------------------------+
 
 
 
+----------------+---------+--------------------+--------------+
| Performing     | Address | City/State/Zipcode | Phone Number |
| Organization   |         |                    |              |
+----------------+---------+--------------------+--------------+
|   EXTERNAL LAB |         |                    |              |
+----------------+---------+--------------------+--------------+
 External Lab: eGFR (2017)
 
 
+-----------+-------+-----------+------------+--------------+
| Component | Value | Ref Range | Performed  | Pathologist  |
|           |       |           | At         | Signature    |
+-----------+-------+-----------+------------+--------------+
| eGFR,     | 60    |           | EXTERNAL   |              |
| External  |       |           | LAB        |              |
+-----------+-------+-----------+------------+--------------+
 
 
 
+----------+
| Specimen |
+----------+
| Blood    |
+----------+
 
 
 
+--------------------------+
| Resulting Agency Comment |
+--------------------------+
|   [facility]              |
+--------------------------+
 
 
 
+----------------+---------+--------------------+--------------+
| Performing     | Address | City/State/Zipcode | Phone Number |
| Organization   |         |                    |              |
+----------------+---------+--------------------+--------------+
|   EXTERNAL LAB |         |                    |              |
+----------------+---------+--------------------+--------------+
 External Lab: Creatinine (2017)
 
+-------------+-------+------------+------------+--------------+
| Component   | Value | Ref Range  | Performed  | Pathologist  |
|             |       |            | At         | Signature    |
+-------------+-------+------------+------------+--------------+
| Creatinine, | 0.93  | 0.7 - 1.18 | EXTERNAL   |              |
|  External   |       |            | LAB        |              |
+-------------+-------+------------+------------+--------------+
 
 
 
+----------+
| Specimen |
+----------+
| Blood    |
+----------+
 
 
 
+--------------------------+
| Resulting Agency Comment |
+--------------------------+
|   [facility]              |
+--------------------------+
 
 
 
 
+----------------+---------+--------------------+--------------+
| Performing     | Address | City/State/Zipcode | Phone Number |
| Organization   |         |                    |              |
+----------------+---------+--------------------+--------------+
|   EXTERNAL LAB |         |                    |              |
+----------------+---------+--------------------+--------------+
 documented in this encounter
 
 Visit Diagnoses
 Not on filedocumented in this encounter

## 2020-01-08 NOTE — XMS
Encounter Summary
  Created on: 2020
 
 Viviana Ricci
 External Reference #: [id number]
 : 47
 Sex: Female
 
 Demographics
 
 
+-----------------------+----------------------+
| Address               | [address]      |
|                       | [place]  [address] |
+-----------------------+----------------------+
| Home Phone            | [phone]      |
+-----------------------+----------------------+
| Preferred Language    | Unknown              |
+-----------------------+----------------------+
| Marital Status        | Single               |
+-----------------------+----------------------+
| Oriental orthodox Affiliation | 1009                 |
+-----------------------+----------------------+
| Race                  | Unknown              |
+-----------------------+----------------------+
| Ethnic Group          | Unknown              |
+-----------------------+----------------------+
 
 
 Author
 
 
+--------------+--------------------------------------------+
| Author       | [facility]  |
|              | and [place]                                |
+--------------+--------------------------------------------+
| Organization | [facility]  |
|              | and [place]                                |
+--------------+--------------------------------------------+
| Address      | Unknown                                    |
+--------------+--------------------------------------------+
| Phone        | Unavailable                                |
+--------------+--------------------------------------------+
 
 
 
 Support
 
 
+----------------+--------------+---------------------+-----------------+
| Name           | Relationship | Address             | Phone           |
+----------------+--------------+---------------------+-----------------+
| Ada/Ed Radhames | ECON         | [place]              | [phone] |
|                |              | [place]  |                 |
|                |              |  [address]              |                 |
+----------------+--------------+---------------------+-----------------+
 
 
 
 
 Care Team Providers
 
 
+-----------------------+------+-------------+
| Care Team Member Name | Role | Phone       |
+-----------------------+------+-------------+
 PCP  | Unavailable |
+-----------------------+------+-------------+
 
 
 
 Reason for Visit
 
 
+-------------------+----------+
| Reason            | Comments |
+-------------------+----------+
| Medication Refill |          |
+-------------------+----------+
 
 
 
 Encounter Details
 
 
+--------+--------+---------------------+----------------------+-------------------+
| Date   | Type   | Department          | Care Team            | Description       |
+--------+--------+---------------------+----------------------+-------------------+
| / | Refill |   [facility]         |   Marzena Moser  | Medication Refill |
|    |        | NEPHROLOGY  [address]   | M, DO  [address]      |                   |
|        |        | [address]   | [address]      |                   |
|        |        | [place], [place]     | [place], [place]      |                   |
|        |        | [address]          | [address]  [phone]  |                   |
|        |        | [phone]        |  [phone] (Fax)  |                   |
+--------+--------+---------------------+----------------------+-------------------+
 
 
 
 Social History
 
 
+--------------+-------+-----------+--------+------+
| Tobacco Use  | Types | Packs/Day | Years  | Date |
|              |       |           | Used   |      |
+--------------+-------+-----------+--------+------+
| Never Smoker |       |           |        |      |
+--------------+-------+-----------+--------+------+
 
 
 
+---------------------+---+---+---+
| Smokeless Tobacco:  |   |   |   |
| Never Used          |   |   |   |
+---------------------+---+---+---+
 
 
 
+---------------------------------------------------------------+
| Comments: some second hand smoke exposure, but fairly minimal |
 
+---------------------------------------------------------------+
 
 
 
+-------------+-------------+---------+----------+
| Alcohol Use | Drinks/Week | oz/Week | Comments |
+-------------+-------------+---------+----------+
| No          |             |         |          |
+-------------+-------------+---------+----------+
 
 
 
+------------------+---------------+
| Sex Assigned at  | Date Recorded |
| Birth            |               |
+------------------+---------------+
| Not on file      |               |
+------------------+---------------+
 
 
 
+----------------+-------------+-------------+
| Job Start Date | Occupation  | Industry    |
+----------------+-------------+-------------+
| Not on file    | Not on file | Not on file |
+----------------+-------------+-------------+
 
 
 
+----------------+--------------+------------+
| Travel History | Travel Start | Travel End |
+----------------+--------------+------------+
 
 
 
+-------------------------------------+
| No recent travel history available. |
+-------------------------------------+
 documented as of this encounter
 
 Plan of Treatment
 
 
+--------+-----------+------------+----------------------+-------------------+
| Date   | Type      | Specialty  | Care Team            | Description       |
+--------+-----------+------------+----------------------+-------------------+
| / | Office    | Cardiology |   HellalfredoTonia,    |                   |
|    | Visit     |            | ARNP  [address]   |                   |
|        |           |            | St  [place], [place]  |                   |
|        |           |            | [address]  [phone]  |                   |
|        |           |            |  [phone] (Fax)  |                   |
+--------+-----------+------------+----------------------+-------------------+
| / | Hospital  | Radiology  |   Popeye Townsend, |                   |
|    | Encounter |            |  MD  [address] |                   |
|        |           |            |  St.  [place],   |                   |
|        |           |            | [place] [address]             |                   |
|        |           |            | [phone]         |                   |
|        |           |            | [phone] (Fax)   |                   |
+--------+-----------+------------+----------------------+-------------------+
| / | Surgery   | Radiology  |   Popeye Townsend, | CV EP PPM SYSTEM  |
 
|    |           |            |  MD  [address] | IMPLANT           |
|        |           |            |  St.  [place],   |                   |
|        |           |            | [place] [address]             |                   |
|        |           |            | [phone]         |                   |
|        |           |            | [phone] (Fax)   |                   |
+--------+-----------+------------+----------------------+-------------------+
| 02/10/ | Clinical  | Cardiology |                      |                   |
|    | Support   |            |                      |                   |
+--------+-----------+------------+----------------------+-------------------+
| / | Office    | Cardiology |   Tonia Wilson,    |                   |
|    | Visit     |            | ARNP  [address]   |                   |
|        |           |            | [place]  |                   |
|        |           |            | [address]  [phone]  |                   |
|        |           |            |  [phone] (Fax)  |                   |
+--------+-----------+------------+----------------------+-------------------+
| / | Off-Site  | Nephrology |   Marzena Moser  |                   |
|    | Visit     |            | DO ETIENNE  [address] [place] [address]      |                   |
|        |           |            | [address]      |                   |
|        |           |            | [place]      |                   |
|        |           |            | [address]  [phone]  |                   |
|        |           |            |  [phone] (Fax)  |                   |
+--------+-----------+------------+----------------------+-------------------+
 documented as of this encounter
 
 Visit Diagnoses
 Not on filedocumented in this encounter

## 2020-01-08 NOTE — XMS
Encounter Summary
  Created on: 2020
 
 Viviana Ricci
 External Reference #: [id number]
 : 47
 Sex: Female
 
 Demographics
 
 
+-----------------------+----------------------+
| Address               | [address]      |
|                       | [place]  [address] |
+-----------------------+----------------------+
| Home Phone            | [phone]      |
+-----------------------+----------------------+
| Preferred Language    | Unknown              |
+-----------------------+----------------------+
| Marital Status        | Single               |
+-----------------------+----------------------+
| Episcopal Affiliation | 1009                 |
+-----------------------+----------------------+
| Race                  | Unknown              |
+-----------------------+----------------------+
| Ethnic Group          | Unknown              |
+-----------------------+----------------------+
 
 
 Author
 
 
+--------------+--------------------------------------------+
| Author       | [facility]  |
|              | and [place]                                |
+--------------+--------------------------------------------+
| Organization | [facility]  |
|              | and [place]                                |
+--------------+--------------------------------------------+
| Address      | Unknown                                    |
+--------------+--------------------------------------------+
| Phone        | Unavailable                                |
+--------------+--------------------------------------------+
 
 
 
 Support
 
 
+----------------+--------------+---------------------+-----------------+
| Name           | Relationship | Address             | Phone           |
+----------------+--------------+---------------------+-----------------+
| Ada/Ed Radhames | ECON         | [place]              | [phone] |
|                |              | [place]  |                 |
|                |              |  [address]              |                 |
+----------------+--------------+---------------------+-----------------+
 
 
 
 
 Care Team Providers
 
 
+-----------------------+------+-------------+
| Care Team Member Name | Role | Phone       |
+-----------------------+------+-------------+
 PCP  | Unavailable |
+-----------------------+------+-------------+
 
 
 
 Reason for Visit
 
 
+-------------------+----------+
| Reason            | Comments |
+-------------------+----------+
| Medication Refill |          |
+-------------------+----------+
 
 
 
 Encounter Details
 
 
+--------+--------+---------------------+----------------------+-------------------+
| Date   | Type   | Department          | Care Team            | Description       |
+--------+--------+---------------------+----------------------+-------------------+
| / | Refill |   [place]         |   Marzena Moser  | Medication Refill |
|    |        | NEPHROLOGY  [address]   | M, DO  [address]      |                   |
|        |        | [address]   | [address]      |                   |
|        |        | [place], [place]     | [place], [place]      |                   |
|        |        | [address]          | [address]  [phone]  |                   |
|        |        | [phone]        |  [phone] (Fax)  |                   |
+--------+--------+---------------------+----------------------+-------------------+
 
 
 
 Social History
 
 
+--------------+-------+-----------+--------+------+
| Tobacco Use  | Types | Packs/Day | Years  | Date |
|              |       |           | Used   |      |
+--------------+-------+-----------+--------+------+
| Never Smoker |       |           |        |      |
+--------------+-------+-----------+--------+------+
 
 
 
+---------------------+---+---+---+
| Smokeless Tobacco:  |   |   |   |
| Never Used          |   |   |   |
+---------------------+---+---+---+
 
 
 
+---------------------------------------------------------------+
| Comments: some second hand smoke exposure, but fairly minimal |
 
+---------------------------------------------------------------+
 
 
 
+-------------+-------------+---------+----------+
| Alcohol Use | Drinks/Week | oz/Week | Comments |
+-------------+-------------+---------+----------+
| No          |             |         |          |
+-------------+-------------+---------+----------+
 
 
 
+------------------+---------------+
| Sex Assigned at  | Date Recorded |
| Birth            |               |
+------------------+---------------+
| Not on file      |               |
+------------------+---------------+
 
 
 
+----------------+-------------+-------------+
| Job Start Date | Occupation  | Industry    |
+----------------+-------------+-------------+
| Not on file    | Not on file | Not on file |
+----------------+-------------+-------------+
 
 
 
+----------------+--------------+------------+
| Travel History | Travel Start | Travel End |
+----------------+--------------+------------+
 
 
 
+-------------------------------------+
| No recent travel history available. |
+-------------------------------------+
 documented as of this encounter
 
 Plan of Treatment
 
 
+--------+-----------+------------+----------------------+-------------------+
| Date   | Type      | Specialty  | Care Team            | Description       |
+--------+-----------+------------+----------------------+-------------------+
| / | Office    | Cardiology |   HellalfredoTonia,    |                   |
|    | Visit     |            | ARNP  [address]   |                   |
|        |           |            | St  [place], [place]  |                   |
|        |           |            | [address]  [phone]  |                   |
|        |           |            |  [phone] (Fax)  |                   |
+--------+-----------+------------+----------------------+-------------------+
| / | Hospital  | Radiology  |   Popeye Townsend, |                   |
|    | Encounter |            |  MD  [address] |                   |
|        |           |            |  St.  [place],   |                   |
|        |           |            | [place] [address]             |                   |
|        |           |            | [phone]         |                   |
|        |           |            | [phone] (Fax)   |                   |
+--------+-----------+------------+----------------------+-------------------+
| / | Surgery   | Radiology  |   Popeye Townsend, | CV EP PPM SYSTEM  |
 
|    |           |            |  MD  [address] | IMPLANT           |
|        |           |            |  St.  [place],   |                   |
|        |           |            | [place] [address]             |                   |
|        |           |            | [phone]         |                   |
|        |           |            | [phone] (Fax)   |                   |
+--------+-----------+------------+----------------------+-------------------+
| 02/10/ | Clinical  | Cardiology |                      |                   |
|    | Support   |            |                      |                   |
+--------+-----------+------------+----------------------+-------------------+
| / | Office    | Cardiology |   Tonia Wilson,    |                   |
|    | Visit     |            | ARNP  [address]   |                   |
|        |           |            | [place]  |                   |
|        |           |            | [address]  [phone]  |                   |
|        |           |            |  [phone] (Fax)  |                   |
+--------+-----------+------------+----------------------+-------------------+
| / | Off-Site  | Nephrology |   Marzena Moser  |                   |
|    | Visit     |            | DO ETIENNE  [address] [place] [address]      |                   |
|        |           |            | [address]      |                   |
|        |           |            | [place]      |                   |
|        |           |            | [address]  [phone]  |                   |
|        |           |            |  [phone] (Fax)  |                   |
+--------+-----------+------------+----------------------+-------------------+
 documented as of this encounter
 
 Visit Diagnoses
 Not on filedocumented in this encounter

## 2020-01-08 NOTE — XMS
Clinical Summary
  Created on: 2020
 
 Viviana Ricci
 : 47
 Sex: Female
 
 Demographics
 
 
+-----------------------+------------------------+
| Address               | [address]           |
|                       | [place]  [address]   |
+-----------------------+------------------------+
| Home Phone            | [phone]        |
+-----------------------+------------------------+
| Preferred Language    | Unknown                |
+-----------------------+------------------------+
| Marital Status        | Single                 |
+-----------------------+------------------------+
| Episcopalian Affiliation | CAT                    |
+-----------------------+------------------------+
| Race                  | White                  |
+-----------------------+------------------------+
| Ethnic Group          | Not  or  |
+-----------------------+------------------------+
 
 
 Author
 
 
+--------------+-------------+
| Organization | Unknown     |
+--------------+-------------+
| Address      | Unknown     |
+--------------+-------------+
| Phone        | Unavailable |
+--------------+-------------+
 
 
 
 Support
 
 
+---------------+--------------+-----------------+-----------------+
| Name          | Relationship | Address         | Phone           |
+---------------+--------------+-----------------+-----------------+
| Ember Ricci | MARILOU         | [place]   | [phone] |
+---------------+--------------+-----------------+-----------------+
 
 
 
 Care Team Providers
 
 
+-----------------------+------+-------------+
| Care Team Member Name | Role | Phone       |
+-----------------------+------+-------------+
 
 PCP  | Unavailable |
+-----------------------+------+-------------+
 
 
 
 Source Comments
 [facility] is fully live on both [facility] and [facility].Formerly [facility]
 
 Allergies
 Not on File
 
 Medications
 Not on file
 
 Active Problems
 Not on file
 
 Social History
 
 
+----------------+-------+-----------+--------+------+
| Tobacco Use    | Types | Packs/Day | Years  | Date |
|                |       |           | Used   |      |
+----------------+-------+-----------+--------+------+
| Never Assessed |       |           |        |      |
+----------------+-------+-----------+--------+------+
 
 
 
+------------------+---------------+
| Sex Assigned at  | Date Recorded |
| Birth            |               |
+------------------+---------------+
| Not on file      |               |
+------------------+---------------+
 
 
 
+----------------+-------------+-------------+
| Job Start Date | Occupation  | Industry    |
+----------------+-------------+-------------+
| Not on file    | Not on file | Not on file |
+----------------+-------------+-------------+
 
 
 
+----------------+--------------+------------+
| Travel History | Travel Start | Travel End |
+----------------+--------------+------------+
 
 
 
+-------------------------------------+
| No recent travel history available. |
+-------------------------------------+
 
 
 
 Last Filed Vital Signs
 
 Not on file
 
 Plan of Treatment
 
 
+----------------------+-----------+-----------+----------+
| Health Maintenance   | Due Date  | Last Done | Comments |
+----------------------+-----------+-----------+----------+
| Pneumococcal         |  |           |          |
| vaccination (1 of 2  | 1         |           |          |
| - PCV13)             |           |           |          |
+----------------------+-----------+-----------+----------+
| Influenza (Flu)      | 10/01/201 |           |          |
| vaccination (#1)     | 9         |           |          |
+----------------------+-----------+-----------+----------+
 
 
 
 Results
 Not on filefrom Last 3 Months

## 2020-01-08 NOTE — XMS
Encounter Summary
  Created on: 2020
 
 Viviana Ricci
 External Reference #: [id number]
 : 47
 Sex: Female
 
 Demographics
 
 
+-----------------------+----------------------+
| Address               | [address]      |
|                       | [place]  [address] |
+-----------------------+----------------------+
| Home Phone            | [phone]      |
+-----------------------+----------------------+
| Preferred Language    | Unknown              |
+-----------------------+----------------------+
| Marital Status        | Single               |
+-----------------------+----------------------+
| [employer] Affiliation | [id number]                 |
+-----------------------+----------------------+
| Race                  | Unknown              |
+-----------------------+----------------------+
| Ethnic Group          | Unknown              |
+-----------------------+----------------------+
 
 
 Author
 
 
+--------------+--------------------------------------------+
| Author       | [facility]  |
|              | and [facility]                                |
+--------------+--------------------------------------------+
| Organization | [facility]  |
|              | and [facility]                                |
+--------------+--------------------------------------------+
| Address      | Unknown                                    |
+--------------+--------------------------------------------+
| Phone        | Unavailable                                |
+--------------+--------------------------------------------+
 
 
 
 Support
 
 
+----------------+--------------+---------------------+-----------------+
| Name           | Relationship | Address             | Phone           |
+----------------+--------------+---------------------+-----------------+
| Ada/Ed Radhames | ECON         | [address]              | [phone] |
|                |              | [place]  |                 |
|                |              |  [id number]              |                 |
+----------------+--------------+---------------------+-----------------+
 
 
 
 
 Care Team Providers
 
 
+-----------------------+------+-------------+
| Care Team Member Name | Role | Phone       |
+-----------------------+------+-------------+
 PCP  | Unavailable |
+-----------------------+------+-------------+
 
 
 
 Reason for Visit
 
 
+-------------------+----------+
| Reason            | Comments |
+-------------------+----------+
| Medication Refill |          |
+-------------------+----------+
 
 
 
 Encounter Details
 
 
+--------+--------+---------------------+----------------------+-------------------+
| Date   | Type   | Department          | Care Team            | Description       |
+--------+--------+---------------------+----------------------+-------------------+
| 11/15/ | Refill |   [facility]         |   Marzena Moser  | Medication Refill |
| 2017   |        | NEPHROLOGY  [address]   | M, DO  [address]      |                   |
|        |        | [address]   | [address]      |                   |
|        |        | [place], [place]     | [place], [place]      |                   |
|        |        | [address]          | [address]  [phone]  |                   |
|        |        | [phone]        |  [phone] (Fax)  |                   |
+--------+--------+---------------------+----------------------+-------------------+
 
 
 
 Social History
 
 
+--------------+-------+-----------+--------+------+
| Tobacco Use  | Types | Packs/Day | Years  | Date |
|              |       |           | Used   |      |
+--------------+-------+-----------+--------+------+
| Never Smoker |       |           |        |      |
+--------------+-------+-----------+--------+------+
 
 
 
+---------------------+---+---+---+
| Smokeless Tobacco:  |   |   |   |
| Never Used          |   |   |   |
+---------------------+---+---+---+
 
 
 
+---------------------------------------------------------------+
| Comments: some second hand smoke exposure, but fairly minimal |
 
+---------------------------------------------------------------+
 
 
 
+-------------+-------------+---------+----------+
| Alcohol Use | Drinks/Week | oz/Week | Comments |
+-------------+-------------+---------+----------+
| No          |             |         |          |
+-------------+-------------+---------+----------+
 
 
 
+------------------+---------------+
| Sex Assigned at  | Date Recorded |
| Birth            |               |
+------------------+---------------+
| Not on file      |               |
+------------------+---------------+
 
 
 
+----------------+-------------+-------------+
| Job Start Date | Occupation  | Industry    |
+----------------+-------------+-------------+
| Not on file    | Not on file | Not on file |
+----------------+-------------+-------------+
 
 
 
+----------------+--------------+------------+
| Travel History | Travel Start | Travel End |
+----------------+--------------+------------+
 
 
 
+-------------------------------------+
| No recent travel history available. |
+-------------------------------------+
 documented as of this encounter
 
 Plan of Treatment
 
 
+--------+-----------+------------+----------------------+-------------------+
| Date   | Type      | Specialty  | Care Team            | Description       |
+--------+-----------+------------+----------------------+-------------------+
| / | Office    | Cardiology |   HellalfredoTonia,    |                   |
|    | Visit     |            | ARNP  [address]   |                   |
|        |           |            | St  [place], [place]  |                   |
|        |           |            | [address]  [phone]  |                   |
|        |           |            |  [phone] (Fax)  |                   |
+--------+-----------+------------+----------------------+-------------------+
| / | Hospital  | Radiology  |   Popeye Townsend, |                   |
|    | Encounter |            |  MD  [address] |                   |
|        |           |            |  St.  [place],   |                   |
|        |           |            | [place] [address]             |                   |
|        |           |            | [phone]         |                   |
|        |           |            | [phone] (Fax)   |                   |
+--------+-----------+------------+----------------------+-------------------+
| / | Surgery   | Radiology  |   Popeye Townsend, | CV EP PPM SYSTEM  |
 
|    |           |            |  MD  [address] | IMPLANT           |
|        |           |            |  St.  [place],   |                   |
|        |           |            | [place] [address]             |                   |
|        |           |            | [phone]         |                   |
|        |           |            | [phone] (Fax)   |                   |
+--------+-----------+------------+----------------------+-------------------+
| 02/10/ | Clinical  | Cardiology |                      |                   |
|    | Support   |            |                      |                   |
+--------+-----------+------------+----------------------+-------------------+
| / | Office    | Cardiology |   Tonia Wilson,    |                   |
|    | Visit     |            | ARNP  [address]   |                   |
|        |           |            | [place]  |                   |
|        |           |            | [address]  [phone]  |                   |
|        |           |            |  [phone] (Fax)  |                   |
+--------+-----------+------------+----------------------+-------------------+
| / | Off-Site  | Nephrology |   Marzena Moser  |                   |
|    | Visit     |            | DO ETIENNE  [address] [place] [address]      |                   |
|        |           |            | [address]      |                   |
|        |           |            | [place]      |                   |
|        |           |            | [address]  [phone]  |                   |
|        |           |            |  [phone] (Fax)  |                   |
+--------+-----------+------------+----------------------+-------------------+
 documented as of this encounter
 
 Visit Diagnoses
 Not on filedocumented in this encounter

## 2020-01-08 NOTE — XMS
Encounter Summary
  Created on: 2020
 
 Ras Hardy
 External Reference #: [id number]
 : 47
 Sex: Female
 
 Demographics
 
 
+-----------------------+----------------------+
| Address               | [address]      |
|                       | [place]  [address] |
+-----------------------+----------------------+
| Home Phone            | [phone]      |
+-----------------------+----------------------+
| Preferred Language    | Unknown              |
+-----------------------+----------------------+
| Marital Status        | Single               |
+-----------------------+----------------------+
| Buddhist Affiliation | [id number]                 |
+-----------------------+----------------------+
| Race                  | Unknown              |
+-----------------------+----------------------+
| Ethnic Group          | Unknown              |
+-----------------------+----------------------+
 
 
 Author
 
 
+--------------+--------------------------------------------+
| Author       | [facility]  |
|              | and [facility]                                |
+--------------+--------------------------------------------+
| Organization | [facility]  |
|              | and [place]                                |
+--------------+--------------------------------------------+
| Address      | Unknown                                    |
+--------------+--------------------------------------------+
| Phone        | Unavailable                                |
+--------------+--------------------------------------------+
 
 
 
 Support
 
 
+----------------+--------------+---------------------+-----------------+
| Name           | Relationship | Address             | Phone           |
+----------------+--------------+---------------------+-----------------+
| Ada/Ed Radhames | ECON         | [address]              | [phone] |
|                |              | [place]  |                 |
|                |              |  [address]              |                 |
+----------------+--------------+---------------------+-----------------+
 
 
 
 
 Care Team Providers
 
 
+-----------------------+------+-------------+
| Care Team Member Name | Role | Phone       |
+-----------------------+------+-------------+
 PCP  | Unavailable |
+-----------------------+------+-------------+
 
 
 
 Encounter Details
 
 
+--------+-----------+----------------------+----------------------+-------------+
| Date   | Type      | Department           | Care Team            | Description |
+--------+-----------+----------------------+----------------------+-------------+
| / | Hospital  |   [facility] |   Abdiel Mireles    | Cough       |
|  - | Encounter |  [facility] XRAY  [address] | Garry Greenfield MD      |             |
|        |           |  [place]       | [address]       |             |
| / |           | [place] [address] | [place]      |             |
|    |           |   [phone]       | [address]  [phone]  |             |
|        |           |                      |  [phone] (Fax)  |             |
+--------+-----------+----------------------+----------------------+-------------+
 
 
 
 Social History
 
 
+--------------+-------+-----------+--------+------+
| Tobacco Use  | Types | Packs/Day | Years  | Date |
|              |       |           | Used   |      |
+--------------+-------+-----------+--------+------+
| Never Smoker |       |           |        |      |
+--------------+-------+-----------+--------+------+
 
 
 
+---------------------+---+---+---+
| Smokeless Tobacco:  |   |   |   |
| Never Used          |   |   |   |
+---------------------+---+---+---+
 
 
 
+-------------+-------------+---------+----------+
| Alcohol Use | Drinks/Week | oz/Week | Comments |
+-------------+-------------+---------+----------+
| No          |             |         |          |
+-------------+-------------+---------+----------+
 
 
 
+------------------+---------------+
| Sex Assigned at  | Date Recorded |
| Birth            |               |
+------------------+---------------+
| Not on file      |               |
 
+------------------+---------------+
 
 
 
+----------------+-------------+-------------+
| Job Start Date | Occupation  | Industry    |
+----------------+-------------+-------------+
| Not on file    | Not on file | Not on file |
+----------------+-------------+-------------+
 
 
 
+----------------+--------------+------------+
| Travel History | Travel Start | Travel End |
+----------------+--------------+------------+
 
 
 
+-------------------------------------+
| No recent travel history available. |
+-------------------------------------+
 documented as of this encounter
 
 Medications at Time of Discharge
 
 
+----------------------+----------------------+-----------+---------+----------+-----------+
| Medication           | Sig                  | Dispensed | Refills | Start    | End Date  |
|                      |                      |           |         | Date     |           |
+----------------------+----------------------+-----------+---------+----------+-----------+
|   glucose blood VI   | Check blood sugar    |   100     | 12      | 20 |           |
| test strips (ONE     | before each meal and | each      |         | 12       |           |
| TOUCH ULTRA TEST)    |  as directed         |           |         |          |           |
| stripIndications:    |                      |           |         |          |           |
| Unspecified          |                      |           |         |          |           |
| hypertensive kidney  |                      |           |         |          |           |
| disease with chronic |                      |           |         |          |           |
|  kidney disease      |                      |           |         |          |           |
| stage I through      |                      |           |         |          |           |
| stage IV, or         |                      |           |         |          |           |
| unspecified(403.90), |                      |           |         |          |           |
|  Complications of    |                      |           |         |          |           |
| transplanted kidney, |                      |           |         |          |           |
|  Diabetes mellitus   |                      |           |         |          |           |
| type II,             |                      |           |         |          |           |
| uncontrolled (HCC),  |                      |           |         |          |           |
| Hyperlipidemia       |                      |           |         |          |           |
+----------------------+----------------------+-----------+---------+----------+-----------+
|   albuterol (PROAIR  | Inhale 2 puffs into  |   1       | 2       | 20 |  |
| HFA) 90 mcg/puff     | the lungs every 6    | Inhaler   |         | 13       | 4         |
| inhalerIndications:  | hours as needed for  |           |         |          |           |
| Cough                | Wheezing.            |           |         |          |           |
+----------------------+----------------------+-----------+---------+----------+-----------+
|   allopurinol        | Take 100 mg by mouth |           | 0       | 20 |  |
| (ZYLOPRIM) 100 mg    |  Daily.              |           |         | 12       | 3         |
| tablet               |                      |           |         |          |           |
+----------------------+----------------------+-----------+---------+----------+-----------+
|   aspirin 81 MG EC   | Take 81 mg by mouth  |           | 0       | 20 |  |
| tablet               | Daily.               |           |         | 12       | 5         |
+----------------------+----------------------+-----------+---------+----------+-----------+
 
|   azithromycin       | Take 2 tablets       |   6       | 0       | 20 |  |
| (ZITHROMAX) 250 mg   | orally on the first  | tablet    |         | 13       | 3         |
| tablet (ED           | day then take one    |           |         |          |           |
| prepack)Indications: | tablet a day for 4   |           |         |          |           |
|  Cough               | days                 |           |         |          |           |
+----------------------+----------------------+-----------+---------+----------+-----------+
|   Cholecalciferol    | Take 5,000 Units by  |           | 0       | 20 |  |
| (VITAMIN D3) 5000    | mouth Once a week.   |           |         | 12       | 4         |
| UNITS CAPS           |                      |           |         |          |           |
+----------------------+----------------------+-----------+---------+----------+-----------+
|   cinacalcet         | Take 1 tablet by     |   30      | 12      | 10/29/20 |  |
| (SENSIPAR) 30 mg     | mouth Daily.         | tablet    |         | 12       | 3         |
| tablet               |                      |           |         |          |           |
+----------------------+----------------------+-----------+---------+----------+-----------+
|   fludrocortisone    | Take 1 tablet by     |   30      | 11      | 10/01/20 |  |
| (FLORINEF) 0.1 mg    | mouth Every other    | tablet    |         | 12       | 4         |
| tablet               | day.                 |           |         |          |           |
+----------------------+----------------------+-----------+---------+----------+-----------+
|   furosemide (LASIX) | Take two tablets by  |   60      | 5       | 20 | 07/15/201 |
|  40 mg tablet        | mouth daily.         | tablet    |         | 12       | 3         |
+----------------------+----------------------+-----------+---------+----------+-----------+
|   insulin glargine   | Inject 20 Units      |           | 0       | 20 |  |
| (LANTUS) 100         | under the skin every |           |         | 12       | 3         |
| units/mL             |  morning.            |           |         |          |           |
| injectionIndications |                      |           |         |          |           |
| : Unspecified        |                      |           |         |          |           |
| hypertensive kidney  |                      |           |         |          |           |
| disease with chronic |                      |           |         |          |           |
|  kidney disease      |                      |           |         |          |           |
| stage I through      |                      |           |         |          |           |
| stage IV, or         |                      |           |         |          |           |
| unspecified(403.90), |                      |           |         |          |           |
|  Complications of    |                      |           |         |          |           |
| transplanted kidney, |                      |           |         |          |           |
|  Diabetes mellitus   |                      |           |         |          |           |
| type II,             |                      |           |         |          |           |
| uncontrolled (HCC),  |                      |           |         |          |           |
| Hyperlipidemia       |                      |           |         |          |           |
+----------------------+----------------------+-----------+---------+----------+-----------+
|   insulin lispro     | Inject               |           | 0       | 20 |  |
| (HUMALOG) 100        | subcutaneously       |           |         | 12       | 3         |
| units/mL injection   | before meals         |           |         |          |           |
|                      | according to sliding |           |         |          |           |
|                      |  scale               |           |         |          |           |
+----------------------+----------------------+-----------+---------+----------+-----------+
|   Insulin            | Use to inject        |           | 0       | 20 |  |
| Syringe-Needle U-100 | insulin              |           |         | 12       | 3         |
|  (BD INSULIN SYRINGE | subcutaneously       |           |         |          |           |
|  ULTRAFINE) 31G X    | before meals or as   |           |         |          |           |
| " 0.5 ML MISC    | directed             |           |         |          |           |
+----------------------+----------------------+-----------+---------+----------+-----------+
|   levothyroxine      | Take 1 tablet by     |   30      | 11      | 10/01/20 |  |
| (LEVOTHROID) 50 mcg  | mouth Daily.         | tablet    |         | 12       | 3         |
| tablet               |                      |           |         |          |           |
+----------------------+----------------------+-----------+---------+----------+-----------+
|   lisinopril         | Take 1 tablet by     |   90      | 3       | 20 |  |
| (PRINIVIL,ZESTRIL)   | mouth Daily.         | tablet    |         | 12       | 3         |
| 30 MG tablet         |                      |           |         |          |           |
+----------------------+----------------------+-----------+---------+----------+-----------+
|   loperamide         | Take 2 mg by mouth   |           | 0       | 20 |  |
 
| (ANTI-DIARRHEAL) 2   | Daily as needed.     |           |         | 12       | 4         |
| mg capsule           |                      |           |         |          |           |
+----------------------+----------------------+-----------+---------+----------+-----------+
|   magnesium oxide    | Take 400 mg by mouth |           | 0       | 20 |  |
| (MAG-OX) 400 mg      |  2 times daily.      |           |         | 12       | 3         |
| tablet               |                      |           |         |          |           |
+----------------------+----------------------+-----------+---------+----------+-----------+
|   metoprolol         | Take 25 mg by mouth  |           | 0       | 20 |  |
| tartrate (LOPRESSOR) | 2 times daily.       |           |         | 12       | 3         |
|  25 mg tablet        |                      |           |         |          |           |
+----------------------+----------------------+-----------+---------+----------+-----------+
|   mycophenolate      | Take 250 mg by mouth |           | 0       | 20 | 10/14/201 |
| (CELLCEPT) 250 mg    |  3 times daily.      |           |         | 11       | 5         |
| capsule              |                      |           |         |          |           |
+----------------------+----------------------+-----------+---------+----------+-----------+
|   niacin (NIASPAN)   | Not taking           |           | 0       | 20 |  |
| 500 mg CR tablet     |                      |           |         | 11       | 3         |
+----------------------+----------------------+-----------+---------+----------+-----------+
|   omeprazole         | Take one capsule by  |           | 0       | 20 |  |
| (PRILOSEC) 20 mg     | mouth once daily on  |           |         | 12       | 3         |
| capsule              | an empty stomach     |           |         |          |           |
+----------------------+----------------------+-----------+---------+----------+-----------+
|   predniSONE         | Take 1 tablet by     |   90      | 3       | 20 |  |
| (DELTASONE) 5 mg     | mouth Daily.         | tablet    |         | 12       | 4         |
| tablet               |                      |           |         |          |           |
+----------------------+----------------------+-----------+---------+----------+-----------+
|   Prenatal           | Take 0.8 mg by mouth |           | 0       | 20 |  |
| Multivit-Min-Fe-FA   |  Daily.              |           |         | 12       | 3         |
| (PRENATAL VITAMINS)  |                      |           |         |          |           |
| 0.8 MG TABS          |                      |           |         |          |           |
+----------------------+----------------------+-----------+---------+----------+-----------+
|   rosuvastatin       | Take 20 mg by mouth  |           | 0       |          |  |
| (CRESTOR) 40 MG      | nightly.             |           |         |          | 3         |
| tabletIndications:   |                      |           |         |          |           |
| Unspecified          |                      |           |         |          |           |
| hypertensive kidney  |                      |           |         |          |           |
| disease with chronic |                      |           |         |          |           |
|  kidney disease      |                      |           |         |          |           |
| stage I through      |                      |           |         |          |           |
| stage IV, or         |                      |           |         |          |           |
| unspecified(403.90), |                      |           |         |          |           |
|  Complications of    |                      |           |         |          |           |
| transplanted kidney, |                      |           |         |          |           |
|  Diabetes mellitus   |                      |           |         |          |           |
| type II,             |                      |           |         |          |           |
| uncontrolled (HCC),  |                      |           |         |          |           |
| Hyperlipidemia       |                      |           |         |          |           |
+----------------------+----------------------+-----------+---------+----------+-----------+
|   tacrolimus         | TAD.                 |           | 0       | 20 | 07/15/201 |
| (PROGRAF) 0.5 mg     |                      |           |         | 12       | 4         |
| capsuleIndications:  |                      |           |         |          |           |
| Unspecified          |                      |           |         |          |           |
| hypertensive kidney  |                      |           |         |          |           |
| disease with chronic |                      |           |         |          |           |
|  kidney disease      |                      |           |         |          |           |
| stage I through      |                      |           |         |          |           |
| stage IV, or         |                      |           |         |          |           |
| unspecified(403.90), |                      |           |         |          |           |
|  Complications of    |                      |           |         |          |           |
| transplanted kidney, |                      |           |         |          |           |
 
|  Diabetes mellitus   |                      |           |         |          |           |
| type II,             |                      |           |         |          |           |
| uncontrolled (HCC),  |                      |           |         |          |           |
| Hyperlipidemia       |                      |           |         |          |           |
+----------------------+----------------------+-----------+---------+----------+-----------+
|   tacrolimus         | Take 1 mg by mouth 2 |           | 0       | 20 |  |
| (PROGRAF) 1 mg       |  times daily.        |           |         | 12       | 3         |
| capsuleIndications:  |                      |           |         |          |           |
| Unspecified          |                      |           |         |          |           |
| hypertensive kidney  |                      |           |         |          |           |
| disease with chronic |                      |           |         |          |           |
|  kidney disease      |                      |           |         |          |           |
| stage I through      |                      |           |         |          |           |
| stage IV, or         |                      |           |         |          |           |
| unspecified(403.90), |                      |           |         |          |           |
|  Complications of    |                      |           |         |          |           |
| transplanted kidney, |                      |           |         |          |           |
|  Diabetes mellitus   |                      |           |         |          |           |
| type II,             |                      |           |         |          |           |
| uncontrolled (HCC),  |                      |           |         |          |           |
| Hyperlipidemia       |                      |           |         |          |           |
+----------------------+----------------------+-----------+---------+----------+-----------+
|   valsartan (DIOVAN) | Take 160 mg by mouth |           | 0       | 20 |  |
|  160 mg tablet       |  Daily.              |           |         | 12       | 3         |
+----------------------+----------------------+-----------+---------+----------+-----------+
 documented as of this encounter
 
 Plan of Treatment
 
 
+--------+-----------+------------+----------------------+-------------------+
| Date   | Type      | Specialty  | Care Team            | Description       |
+--------+-----------+------------+----------------------+-------------------+
| / | Office    | Cardiology |   RakeshTonia andre,    |                   |
|    | Visit     |            | [address]   |                   |
|        |           |            | St  [place], [place]  |                   |
|        |           |            | [address]  [phone]  |                   |
|        |           |            |  [phone] (Fax)  |                   |
+--------+-----------+------------+----------------------+-------------------+
| / | Hospital  | Radiology  |   Popeye Townsend, |                   |
|    | Encounter |            |  MD  [address] |                   |
|        |           |            |  St.  [place],   |                   |
|        |           |            | [place] [address]             |                   |
|        |           |            | [phone]         |                   |
|        |           |            | [phone] (Fax)   |                   |
+--------+-----------+------------+----------------------+-------------------+
| / | Surgery   | Radiology  |   Popeye Townsend, | CV EP PPM SYSTEM  |
|    |           |            |  MD  [address] | IMPLANT           |
|        |           |            |  St.  [place],   |                   |
|        |           |            | [place] [address]             |                   |
|        |           |            | [phone]         |                   |
|        |           |            | [phone] (Fax)   |                   |
+--------+-----------+------------+----------------------+-------------------+
| 02/10/ | Clinical  | Cardiology |                      |                   |
|    | Support   |            |                      |                   |
+--------+-----------+------------+----------------------+-------------------+
| / | Office    | Cardiology |   RakeshmaxxalfredoArlena,    |                   |
|    | Visit     |            | POP  [address]   |                   |
|        |           |            | St  [place]  |                   |
|        |           |            | [address]  [phone]  |                   |
 
|        |           |            |  [phone] (Fax)  |                   |
+--------+-----------+------------+----------------------+-------------------+
| / | Off-Site  | Nephrology |   Marzena Moser  |                   |
|    | Visit     |            | DO ETIENNE  [address]      |                   |
|        |           |            | [address]      |                   |
|        |           |            | [place]      |                   |
|        |           |            | [address]  [phone]  |                   |
|        |           |            |  [phone] (Fax)  |                   |
+--------+-----------+------------+----------------------+-------------------+
 documented as of this encounter
 
 Procedures
 
 
+------------------+--------+-------------+----------------------+----------------------+
| Procedure Name   | Priori | Date/Time   | Associated Diagnosis | Comments             |
|                  | ty     |             |                      |                      |
+------------------+--------+-------------+----------------------+----------------------+
| XR CHEST PA AND  | Routin | 2013  |   Cough              |   Results for this   |
| LATERAL          | e      | 10:54 AM    |                      | procedure are in the |
|                  |        | PST         |                      |  results section.    |
+------------------+--------+-------------+----------------------+----------------------+
 documented in this encounter
 
 Results
 XR Chest PA and Lateral (2013 10:54 AM PST)
 
+----------+
| Specimen |
+----------+
|          |
+----------+
 
 
 
+------------------------------------------------------------------------+-----------------+
| Narrative                                                              | Performed At    |
+------------------------------------------------------------------------+-----------------+
|    [facility]      Diagnostic Imaging          |   [place]    |
| Department      [address] [place]      [phone]    | [facility]        |
|                             [   rep ct [address]]     [   rep ct [facility]  |
| st zip]     **** Signed ****                                           | - IMAGING       |
| ______________________________________________________________________ |                 |
| ______________________     Patient Name: RAS HARDY PITA   Physician:     |                 |
| 02     : 1945    Age: 67   Sex: F     Unit #: [id number]    |                 |
|   Exam Date: 13     Acct #: [id number]   [place]: [facility] [place]     |                 |
|     Report #: [id number]                      Page:                     |                 |
|   %(RAD)RES..mtdd.print.filter("pg") of   %(RAD)                       |                 |
| RES..mtdd.print.filter("tpg")                                          |                 |
| ______________________________________________________________________ |                 |
| ______________________     Accession Number: [id number]                |                 |
| TWO VIEW CHEST              CLINICAL HISTORY:   COUGH.                 |                 |
| COMPARISON:   2008.              FINDINGS:   There is    |                 |
| stable mild cardiomegaly.   Sternal wires are in unchanged position.   |                 |
|   Aorta shows   intimal calcifications but a stable appearance.        |                 |
|   Pulmonary vasculature is somewhat prominent centrally   but also     |                 |
| unchanged.              No focal areas of abnormal lung density are    |                 |
| seen.   The patient is somewhat rotated on this exam.   No   acute     |                 |
| bony abnormalities.              IMPRESSION:       1. POSTOP           |                 |
| STERNOTOMY WITH STABLE CARDIOMEGALY. NO ACUTE CARDIOPULMONARY          |                 |
 
| ABNORMALITY.              Dictated Date/Time:   2013 10:54       |                 |
| Transcribed Date/Time:   2013 11:03      :       |                 |
|                         <<Signature on File>>                     |                 |
| -----------------------------------------------------------            |                 |
| Jon Marcus      <Electronically signed by       |                 |
| Jon Marcus MD>               Jon Marcus MD 13     |                 |
| 1054     : [id number]/14/13 1103          |                 |
|       Abdiel Mireles Jr, MD                                         |                 |
+------------------------------------------------------------------------+-----------------+
 
 
 
+----------------------+---------------------+--------------------+----------------+
| Performing           | Address             | City/State/Zipcode | Phone Number   |
| Organization         |                     |                    |                |
+----------------------+---------------------+--------------------+----------------+
|   [facility]     |   [address] | [place]    |   [phone] |
| [facility]  |                     | [address]              |                |
| - IMAGING            |                     |                    |                |
+----------------------+---------------------+--------------------+----------------+
 documented in this encounter
 
 Visit Diagnoses
 
 
+-----------+
| Diagnosis |
+-----------+
|   Cough   |
+-----------+
 documented in this encounter

## 2020-01-08 NOTE — XMS
Encounter Summary
  Created on: 2020
 
 Viviana Ricci
 External Reference #: [id number]
 : 47
 Sex: Female
 
 Demographics
 
 
+-----------------------+----------------------+
| Address               | [address]      |
|                       | [place]  [address] |
+-----------------------+----------------------+
| Home Phone            | [phone]      |
+-----------------------+----------------------+
| Preferred Language    | Unknown              |
+-----------------------+----------------------+
| Marital Status        | Single               |
+-----------------------+----------------------+
| Episcopalian Affiliation | 1009                 |
+-----------------------+----------------------+
| Race                  | Unknown              |
+-----------------------+----------------------+
| Ethnic Group          | Unknown              |
+-----------------------+----------------------+
 
 
 Author
 
 
+--------------+--------------------------------------------+
| Author       | Located within [facility]  |
|              | and [place]                                |
+--------------+--------------------------------------------+
| Organization | Located within [facility]  |
|              | and [place]                                |
+--------------+--------------------------------------------+
| Address      | Unknown                                    |
+--------------+--------------------------------------------+
| Phone        | Unavailable                                |
+--------------+--------------------------------------------+
 
 
 
 Support
 
 
+----------------+--------------+---------------------+-----------------+
| Name           | Relationship | Address             | Phone           |
+----------------+--------------+---------------------+-----------------+
| Ada/Ed Radhames | ECON         | [place]              | [phone] |
|                |              | [place]  |                 |
|                |              |  [address]              |                 |
+----------------+--------------+---------------------+-----------------+
 
 
 
 
 Care Team Providers
 
 
+-----------------------+------+-------------+
| Care Team Member Name | Role | Phone       |
+-----------------------+------+-------------+
 PCP  | Unavailable |
+-----------------------+------+-------------+
 
 
 
 Reason for Visit
 
 
+-------------------+----------+
| Reason            | Comments |
+-------------------+----------+
| Medication Refill |          |
+-------------------+----------+
 
 
 
 Encounter Details
 
 
+--------+--------+---------------------+----------------------+-------------------+
| Date   | Type   | Department          | Care Team            | Description       |
+--------+--------+---------------------+----------------------+-------------------+
| / | Refill |   [place]         |   Marzena Moser  | Medication Refill |
|    |        | NEPHROLOGY  [address]   | M, DO  [address]      |                   |
|        |        | [address]   | [address]      |                   |
|        |        | [place], [place]     | [place], [place]      |                   |
|        |        | [address]          | [address]  [phone]  |                   |
|        |        | [phone]        |  [phone] (Fax)  |                   |
+--------+--------+---------------------+----------------------+-------------------+
 
 
 
 Social History
 
 
+--------------+-------+-----------+--------+------+
| Tobacco Use  | Types | Packs/Day | Years  | Date |
|              |       |           | Used   |      |
+--------------+-------+-----------+--------+------+
| Never Smoker |       |           |        |      |
+--------------+-------+-----------+--------+------+
 
 
 
+---------------------+---+---+---+
| Smokeless Tobacco:  |   |   |   |
| Never Used          |   |   |   |
+---------------------+---+---+---+
 
 
 
+---------------------------------------------------------------+
| Comments: some second hand smoke exposure, but fairly minimal |
 
+---------------------------------------------------------------+
 
 
 
+-------------+-------------+---------+----------+
| Alcohol Use | Drinks/Week | oz/Week | Comments |
+-------------+-------------+---------+----------+
| No          |             |         |          |
+-------------+-------------+---------+----------+
 
 
 
+------------------+---------------+
| Sex Assigned at  | Date Recorded |
| Birth            |               |
+------------------+---------------+
| Not on file      |               |
+------------------+---------------+
 
 
 
+----------------+-------------+-------------+
| Job Start Date | Occupation  | Industry    |
+----------------+-------------+-------------+
| Not on file    | Not on file | Not on file |
+----------------+-------------+-------------+
 
 
 
+----------------+--------------+------------+
| Travel History | Travel Start | Travel End |
+----------------+--------------+------------+
 
 
 
+-------------------------------------+
| No recent travel history available. |
+-------------------------------------+
 documented as of this encounter
 
 Plan of Treatment
 
 
+--------+-----------+------------+----------------------+-------------------+
| Date   | Type      | Specialty  | Care Team            | Description       |
+--------+-----------+------------+----------------------+-------------------+
| / | Office    | Cardiology |   HellalfredoTonia,    |                   |
|    | Visit     |            | ARNP  [address]   |                   |
|        |           |            | St  [place], [place]  |                   |
|        |           |            | [address]  [phone]  |                   |
|        |           |            |  [phone] (Fax)  |                   |
+--------+-----------+------------+----------------------+-------------------+
| / | Hospital  | Radiology  |   Popeye Townsend, |                   |
|    | Encounter |            |  MD  [address] |                   |
|        |           |            |  St.  [place],   |                   |
|        |           |            | [place] [address]             |                   |
|        |           |            | [phone]         |                   |
|        |           |            | [phone] (Fax)   |                   |
+--------+-----------+------------+----------------------+-------------------+
| / | Surgery   | Radiology  |   Popeye Townsend, | CV EP PPM SYSTEM  |
 
|    |           |            |  MD  [address] | IMPLANT           |
|        |           |            |  St.  [place],   |                   |
|        |           |            | [place] [address]             |                   |
|        |           |            | [phone]         |                   |
|        |           |            | [phone] (Fax)   |                   |
+--------+-----------+------------+----------------------+-------------------+
| 02/10/ | Clinical  | Cardiology |                      |                   |
|    | Support   |            |                      |                   |
+--------+-----------+------------+----------------------+-------------------+
| / | Office    | Cardiology |   Tonia Wilson,    |                   |
|    | Visit     |            | ARNP  [address]   |                   |
|        |           |            | [place]  |                   |
|        |           |            | [address]  [phone]  |                   |
|        |           |            |  [phone] (Fax)  |                   |
+--------+-----------+------------+----------------------+-------------------+
| / | Off-Site  | Nephrology |   Marzena Moser  |                   |
|    | Visit     |            | DO ETIENNE  [address] [place] [address]      |                   |
|        |           |            | [address]      |                   |
|        |           |            | [place]      |                   |
|        |           |            | [address]  [phone]  |                   |
|        |           |            |  [phone] (Fax)  |                   |
+--------+-----------+------------+----------------------+-------------------+
 documented as of this encounter
 
 Visit Diagnoses
 Not on filedocumented in this encounter

## 2020-01-08 NOTE — XMS
Encounter Summary
  Created on: 2020
 
 Viviana Ricci
 External Reference #: [id number]
 : 47
 Sex: Female
 
 Demographics
 
 
+-----------------------+----------------------+
| Address               | [address]      |
|                       | [place]  [address] |
+-----------------------+----------------------+
| Home Phone            | [phone]      |
+-----------------------+----------------------+
| Preferred Language    | Unknown              |
+-----------------------+----------------------+
| Marital Status        | Single               |
+-----------------------+----------------------+
| Islam Affiliation | [id number]                 |
+-----------------------+----------------------+
| Race                  | Unknown              |
+-----------------------+----------------------+
| Ethnic Group          | Unknown              |
+-----------------------+----------------------+
 
 
 Author
 
 
+--------------+--------------------------------------------+
| Author       | [facility]  |
|              | and [facility]                                |
+--------------+--------------------------------------------+
| Organization | [facility]  |
|              | and [facility]                                |
+--------------+--------------------------------------------+
| Address      | Unknown                                    |
+--------------+--------------------------------------------+
| Phone        | Unavailable                                |
+--------------+--------------------------------------------+
 
 
 
 Support
 
 
+----------------+--------------+---------------------+-----------------+
| Name           | Relationship | Address             | Phone           |
+----------------+--------------+---------------------+-----------------+
| Ada/Ed Radhames | ECON         | [address]              | [phone] |
|                |              | [place]  |                 |
|                |              |  [address]              |                 |
+----------------+--------------+---------------------+-----------------+
 
 
 
 
 Care Team Providers
 
 
+-----------------------+------+-------------+
| Care Team Member Name | Role | Phone       |
+-----------------------+------+-------------+
 PCP  | Unavailable |
+-----------------------+------+-------------+
 
 
 
 Encounter Details
 
 
+--------+----------+---------------------+----------------------+-------------+
| Date   | Type     | Department          | Care Team            | Description |
+--------+----------+---------------------+----------------------+-------------+
| / | Abstract |   [facility] [place]         |   Marzena Moser  |             |
| 2017   |          | NEPHROLOGY  [address]   | M, DO  [address]      |             |
|        |          | [address]   | [address]      |             |
|        |          | [place], [place]     | [place]      |             |
|        |          | [address]          | [address]  [phone]  |             |
|        |          | [phone]        |  [phone] (Fax)  |             |
+--------+----------+---------------------+----------------------+-------------+
 
 
 
 Social History
 
 
+--------------+-------+-----------+--------+------+
| Tobacco Use  | Types | Packs/Day | Years  | Date |
|              |       |           | Used   |      |
+--------------+-------+-----------+--------+------+
| Never Smoker |       |           |        |      |
+--------------+-------+-----------+--------+------+
 
 
 
+---------------------+---+---+---+
| Smokeless Tobacco:  |   |   |   |
| Never Used          |   |   |   |
+---------------------+---+---+---+
 
 
 
+---------------------------------------------------------------+
| Comments: some second hand smoke exposure, but fairly minimal |
+---------------------------------------------------------------+
 
 
 
+-------------+-------------+---------+----------+
| Alcohol Use | Drinks/Week | oz/Week | Comments |
+-------------+-------------+---------+----------+
| No          |             |         |          |
+-------------+-------------+---------+----------+
 
 
 
 
+------------------+---------------+
| Sex Assigned at  | Date Recorded |
| Birth            |               |
+------------------+---------------+
| Not on file      |               |
+------------------+---------------+
 
 
 
+----------------+-------------+-------------+
| Job Start Date | Occupation  | Industry    |
+----------------+-------------+-------------+
| Not on file    | Not on file | Not on file |
+----------------+-------------+-------------+
 
 
 
+----------------+--------------+------------+
| Travel History | Travel Start | Travel End |
+----------------+--------------+------------+
 
 
 
+-------------------------------------+
| No recent travel history available. |
+-------------------------------------+
 documented as of this encounter
 
 Plan of Treatment
 
 
+--------+-----------+------------+----------------------+-------------------+
| Date   | Type      | Specialty  | Care Team            | Description       |
+--------+-----------+------------+----------------------+-------------------+
| / | Office    | Cardiology |   Tonia Wilson,    |                   |
|    | Visit     |            | ARNJESSICA  [address]   |                   |
|        |           |            | [place]  |                   |
|        |           |            | [address]  [phone]  |                   |
|        |           |            |  [phone] (Fax)  |                   |
+--------+-----------+------------+----------------------+-------------------+
| / | Hospital  | Radiology  |   Popeye Townsend, |                   |
|    | Encounter |            |  MD  [address] |                   |
|        |           |            |  St.  [place],   |                   |
|        |           |            | [place] [address]             |                   |
|        |           |            | [phone]         |                   |
|        |           |            | [phone] (Fax)   |                   |
+--------+-----------+------------+----------------------+-------------------+
| / | Surgery   | Radiology  |   Popeye Townsend, | CV EP PPM SYSTEM  |
|    |           |            |  MD  [address] | IMPLANT           |
|        |           |            |  St.  [place],   |                   |
|        |           |            | [place] [address]             |                   |
|        |           |            | [phone]         |                   |
|        |           |            | [phone] (Fax)   |                   |
+--------+-----------+------------+----------------------+-------------------+
| 02/10/ | Clinical  | Cardiology |                      |                   |
|    | Support   |            |                      |                   |
+--------+-----------+------------+----------------------+-------------------+
| / | Office    | Cardiology |   Tonia Wilson,    |                   |
|    | Visit     |            | ARNP  [address]   |                   |
 
|        |           |            | St  [place], [place]  |                   |
|        |           |            | [address]  [phone]  |                   |
|        |           |            |  [phone] (Fax)  |                   |
+--------+-----------+------------+----------------------+-------------------+
| / | Off-Site  | Nephrology |   Marzena Moser  |                   |
|    | Visit     |            | DO ETIENNE  [address] [place] [address]      |                   |
|        |           |            | [address]      |                   |
|        |           |            | [place]      |                   |
|        |           |            | [address]  [phone]  |                   |
|        |           |            |  [phone] (Fax)  |                   |
+--------+-----------+------------+----------------------+-------------------+
 documented as of this encounter
 
 Procedures
 
 
+----------------------+--------+------------+----------------------+----------------------+
| Procedure Name       | Priori | Date/Time  | Associated Diagnosis | Comments             |
|                      | ty     |            |                      |                      |
+----------------------+--------+------------+----------------------+----------------------+
| EXTERNAL LAB: BUN    | Routin | 2017 |                      |   Results for this   |
|                      | e      |            |                      | procedure are in the |
|                      |        |            |                      |  results section.    |
+----------------------+--------+------------+----------------------+----------------------+
| EXTERNAL LAB:        | Routin | 2017 |                      |   Results for this   |
| GLUCOSE              | e      |            |                      | procedure are in the |
|                      |        |            |                      |  results section.    |
+----------------------+--------+------------+----------------------+----------------------+
| EXTERNAL LAB:        | Routin | 2017 |                      |   Results for this   |
| TACROLIMUS LEVEL,    | e      |            |                      | procedure are in the |
| [facility]IA                 |        |            |                      |  results section.    |
+----------------------+--------+------------+----------------------+----------------------+
| EXTERNAL LAB: ALT    | Routin | 2017 |                      |   Results for this   |
|                      | e      |            |                      | procedure are in the |
|                      |        |            |                      |  results section.    |
+----------------------+--------+------------+----------------------+----------------------+
| EXTERNAL LAB: AST    | Routin | 2017 |                      |   Results for this   |
|                      | e      |            |                      | procedure are in the |
|                      |        |            |                      |  results section.    |
+----------------------+--------+------------+----------------------+----------------------+
| EXTERNAL LAB:        | Routin | 2017 |                      |   Results for this   |
| ALKALINE PHOSPHATASE | e      |            |                      | procedure are in the |
|                      |        |            |                      |  results section.    |
+----------------------+--------+------------+----------------------+----------------------+
| EXTERNAL LAB:        | Routin | 2017 |                      |   Results for this   |
| BILIRUBIN, TOTAL     | e      |            |                      | procedure are in the |
|                      |        |            |                      |  results section.    |
+----------------------+--------+------------+----------------------+----------------------+
| EXTERNAL LAB:        | Routin | 2017 |                      |   Results for this   |
| ALBUMIN              | e      |            |                      | procedure are in the |
|                      |        |            |                      |  results section.    |
+----------------------+--------+------------+----------------------+----------------------+
| EXTERNAL LAB:        | Routin | 2017 |                      |   Results for this   |
| PROTEIN, TOTAL       | e      |            |                      | procedure are in the |
|                      |        |            |                      |  results section.    |
+----------------------+--------+------------+----------------------+----------------------+
| EXTERNAL LAB:        | Routin | 2017 |                      |   Results for this   |
| PHOSPHORUS           | e      |            |                      | procedure are in the |
|                      |        |            |                      |  results section.    |
+----------------------+--------+------------+----------------------+----------------------+
 
| EXTERNAL LAB:        | Routin | 2017 |                      |   Results for this   |
| MAGNESIUM            | e      |            |                      | procedure are in the |
|                      |        |            |                      |  results section.    |
+----------------------+--------+------------+----------------------+----------------------+
| EXTERNAL LAB:        | Routin | 2017 |                      |   Results for this   |
| CALCIUM              | e      |            |                      | procedure are in the |
|                      |        |            |                      |  results section.    |
+----------------------+--------+------------+----------------------+----------------------+
| EXTERNAL LAB: CARBON | Routin | 2017 |                      |   Results for this   |
|  DIOXIDE             | e      |            |                      | procedure are in the |
|                      |        |            |                      |  results section.    |
+----------------------+--------+------------+----------------------+----------------------+
| EXTERNAL LAB:        | Routin | 2017 |                      |   Results for this   |
| CHLORIDE             | e      |            |                      | procedure are in the |
|                      |        |            |                      |  results section.    |
+----------------------+--------+------------+----------------------+----------------------+
| EXTERNAL LAB:        | Routin | 2017 |                      |   Results for this   |
| POTASSIUM            | e      |            |                      | procedure are in the |
|                      |        |            |                      |  results section.    |
+----------------------+--------+------------+----------------------+----------------------+
| EXTERNAL LAB: SODIUM | Routin | 2017 |                      |   Results for this   |
|                      | e      |            |                      | procedure are in the |
|                      |        |            |                      |  results section.    |
+----------------------+--------+------------+----------------------+----------------------+
| EXTERNAL LAB: CBC    | Routin | 2017 |                      |   Results for this   |
|                      | e      |            |                      | procedure are in the |
|                      |        |            |                      |  results section.    |
+----------------------+--------+------------+----------------------+----------------------+
| EXTERNAL LAB:        | Routin | 2017 |                      |   Results for this   |
| TRIGLYCERIDES        | e      |            |                      | procedure are in the |
|                      |        |            |                      |  results section.    |
+----------------------+--------+------------+----------------------+----------------------+
| EXTERNAL LAB:        | Routin | 2017 |                      |   Results for this   |
| CHOLESTEROL, HDL     | e      |            |                      | procedure are in the |
|                      |        |            |                      |  results section.    |
+----------------------+--------+------------+----------------------+----------------------+
| EXTERNAL LAB:        | Routin | 2017 |                      |   Results for this   |
| CHOLESTEROL, TOTAL   | e      |            |                      | procedure are in the |
|                      |        |            |                      |  results section.    |
+----------------------+--------+------------+----------------------+----------------------+
| EXTERNAL LAB:        | Routin | 2017 |                      |   Results for this   |
| CHOLESTEROL, LDL     | e      |            |                      | procedure are in the |
| DIRECT               |        |            |                      |  results section.    |
+----------------------+--------+------------+----------------------+----------------------+
| EXTERNAL LAB:        | Routin | 2017 |                      |   Results for this   |
| CHOLESTEROL, LDL     | e      |            |                      | procedure are in the |
|                      |        |            |                      |  results section.    |
+----------------------+--------+------------+----------------------+----------------------+
| EXTERNAL LAB: EGFR   | Routin | 2017 |                      |   Results for this   |
|                      | e      |            |                      | procedure are in the |
|                      |        |            |                      |  results section.    |
+----------------------+--------+------------+----------------------+----------------------+
| EXTERNAL LAB:        | Routin | 2017 |                      |   Results for this   |
| CREATININE           | e      |            |                      | procedure are in the |
|                      |        |            |                      |  results section.    |
+----------------------+--------+------------+----------------------+----------------------+
| HEMOGLOBIN A1C       | Routin | 2017 |                      |   Results for this   |
|                      | e      |            |                      | procedure are in the |
|                      |        |            |                      |  results section.    |
+----------------------+--------+------------+----------------------+----------------------+
 
 documented in this encounter
 
 Results
 External Lab: Tacrolimus Level, CMIA (2017)
 
+-------------+-------+-----------+------------+--------------+
| Component   | Value | Ref Range | Performed  | Pathologist  |
|             |       |           | At         | Signature    |
+-------------+-------+-----------+------------+--------------+
| Tacrolimus, | 6.3   |           |            |              |
|  CMIA,      |       |           |            |              |
| External    |       |           |            |              |
+-------------+-------+-----------+------------+--------------+
 
 
 
+----------+
| Specimen |
+----------+
|          |
+----------+
 Hemoglobin A1C (2017)
 
+-------------+-------+-----------+------------+--------------+
| Component   | Value | Ref Range | Performed  | Pathologist  |
|             |       |           | At         | Signature    |
+-------------+-------+-----------+------------+--------------+
| Hemoglobin  | 7.6   |           | EXTERNAL   |              |
| A1c,        |       |           | LAB        |              |
| external    |       |           |            |              |
+-------------+-------+-----------+------------+--------------+
 
 
 
+-----------------+
| Specimen        |
+-----------------+
| Blood specimen  |
| (specimen)      |
+-----------------+
 
 
 
+--------------------------+
| Resulting Agency Comment |
+--------------------------+
|   [facility]              |
+--------------------------+
 
 
 
+----------------+---------+--------------------+--------------+
| Performing     | Address | City/State/Zipcode | Phone Number |
| Organization   |         |                    |              |
+----------------+---------+--------------------+--------------+
|   EXTERNAL LAB |         |                    |              |
+----------------+---------+--------------------+--------------+
 External Lab: Alkaline Phosphatase (2017)
 
+-----------+-------+-----------+------------+--------------+
 
| Component | Value | Ref Range | Performed  | Pathologist  |
|           |       |           | At         | Signature    |
+-----------+-------+-----------+------------+--------------+
| ALP,      | 108   | 31 - 130  | EXTERNAL   |              |
| External  |       |           | LAB        |              |
+-----------+-------+-----------+------------+--------------+
 
 
 
+--------------------------+
| Resulting Agency Comment |
+--------------------------+
|   [facility]              |
+--------------------------+
 
 
 
+----------------+---------+--------------------+--------------+
| Performing     | Address | City/State/Zipcode | Phone Number |
| Organization   |         |                    |              |
+----------------+---------+--------------------+--------------+
|   EXTERNAL LAB |         |                    |              |
+----------------+---------+--------------------+--------------+
 External Lab: Bilirubin, Total (2017)
 
+-------------+-------+-----------+------------+--------------+
| Component   | Value | Ref Range | Performed  | Pathologist  |
|             |       |           | At         | Signature    |
+-------------+-------+-----------+------------+--------------+
| Bilirubin,  | 0.6   | 0 - 1.2   | EXTERNAL   |              |
| Total,      |       |           | LAB        |              |
| External    |       |           |            |              |
+-------------+-------+-----------+------------+--------------+
 
 
 
+--------------------------+
| Resulting Agency Comment |
+--------------------------+
|   [facility]              |
+--------------------------+
 
 
 
+----------------+---------+--------------------+--------------+
| Performing     | Address | City/State/Zipcode | Phone Number |
| Organization   |         |                    |              |
+----------------+---------+--------------------+--------------+
|   EXTERNAL LAB |         |                    |              |
+----------------+---------+--------------------+--------------+
 External Lab: CBC (2017)
 
+-----------+---------+-----------+------------+--------------+
| Component | Value   | Ref Range | Performed  | Pathologist  |
|           |         |           | At         | Signature    |
+-----------+---------+-----------+------------+--------------+
| WBC,      | 5.5     | 4.5 - 11  | EXTERNAL   |              |
| External  |         |           | LAB        |              |
+-----------+---------+-----------+------------+--------------+
| HGB,      | 13.8    | 12 - 16   | EXTERNAL   |              |
 
| External  |         |           | LAB        |              |
+-----------+---------+-----------+------------+--------------+
| HCT,      | 43.3    | 35 - 45   | EXTERNAL   |              |
| External  |         |           | LAB        |              |
+-----------+---------+-----------+------------+--------------+
| PLT,      | 169     | 140 - 440 | EXTERNAL   |              |
| External  |         |           | LAB        |              |
+-----------+---------+-----------+------------+--------------+
| RBC,      | 4.17    | 3.8 - 5.1 | EXTERNAL   |              |
| External  |         |           | LAB        |              |
+-----------+---------+-----------+------------+--------------+
| MCV,      | 104 (A) | 81 - 99   | EXTERNAL   |              |
| External  |         |           | LAB        |              |
+-----------+---------+-----------+------------+--------------+
| RDW,      | 14.1    | 10.5 - 15 | EXTERNAL   |              |
| External  |         |           | LAB        |              |
+-----------+---------+-----------+------------+--------------+
 
 
 
+--------------------------+
| Resulting Agency Comment |
+--------------------------+
|   [facility]              |
+--------------------------+
 
 
 
+----------------+---------+--------------------+--------------+
| Performing     | Address | City/State/Zipcode | Phone Number |
| Organization   |         |                    |              |
+----------------+---------+--------------------+--------------+
|   EXTERNAL LAB |         |                    |              |
+----------------+---------+--------------------+--------------+
 External Lab: ALMA (2017)
 
+-----------+--------+-----------+------------+--------------+
| Component | Value  | Ref Range | Performed  | Pathologist  |
|           |        |           | At         | Signature    |
+-----------+--------+-----------+------------+--------------+
| BUN,      | 42 (A) | 6 - 23    | EXTERNAL   |              |
| External  |        |           | LAB        |              |
+-----------+--------+-----------+------------+--------------+
 
 
 
+--------------------------+
| Resulting Agency Comment |
+--------------------------+
|   [facility]              |
+--------------------------+
 
 
 
+----------------+---------+--------------------+--------------+
| Performing     | Address | City/State/Zipcode | Phone Number |
| Organization   |         |                    |              |
+----------------+---------+--------------------+--------------+
|   EXTERNAL LAB |         |                    |              |
+----------------+---------+--------------------+--------------+
 
 External Lab: Glucose (2017)
 
+-----------+---------+-----------+------------+--------------+
| Component | Value   | Ref Range | Performed  | Pathologist  |
|           |         |           | At         | Signature    |
+-----------+---------+-----------+------------+--------------+
| Glucose,  | 129 (A) | 70 - 100  | EXTERNAL   |              |
| External  |         |           | LAB        |              |
+-----------+---------+-----------+------------+--------------+
 
 
 
+--------------------------+
| Resulting Agency Comment |
+--------------------------+
|   [facility]              |
+--------------------------+
 
 
 
+----------------+---------+--------------------+--------------+
| Performing     | Address | City/State/Zipcode | Phone Number |
| Organization   |         |                    |              |
+----------------+---------+--------------------+--------------+
|   EXTERNAL LAB |         |                    |              |
+----------------+---------+--------------------+--------------+
 External Lab: ALT (2017)
 
+-----------+-------+-----------+------------+--------------+
| Component | Value | Ref Range | Performed  | Pathologist  |
|           |       |           | At         | Signature    |
+-----------+-------+-----------+------------+--------------+
| ALT,      | 16    | 7 - 52    | EXTERNAL   |              |
| External  |       |           | LAB        |              |
+-----------+-------+-----------+------------+--------------+
 
 
 
+--------------------------+
| Resulting Agency Comment |
+--------------------------+
|   [facility]              |
+--------------------------+
 
 
 
+----------------+---------+--------------------+--------------+
| Performing     | Address | City/State/Zipcode | Phone Number |
| Organization   |         |                    |              |
+----------------+---------+--------------------+--------------+
|   EXTERNAL LAB |         |                    |              |
+----------------+---------+--------------------+--------------+
 External Lab: AST (2017)
 
+-----------+-------+-----------+------------+--------------+
| Component | Value | Ref Range | Performed  | Pathologist  |
|           |       |           | At         | Signature    |
+-----------+-------+-----------+------------+--------------+
| AST,      | 22    |           | EXTERNAL   |              |
| External  |       |           | LAB        |              |
 
+-----------+-------+-----------+------------+--------------+
 
 
 
+--------------------------+
| Resulting Agency Comment |
+--------------------------+
|   [facility]              |
+--------------------------+
 
 
 
+----------------+---------+--------------------+--------------+
| Performing     | Address | City/State/Zipcode | Phone Number |
| Organization   |         |                    |              |
+----------------+---------+--------------------+--------------+
|   EXTERNAL LAB |         |                    |              |
+----------------+---------+--------------------+--------------+
 External Lab: Albumin (2017)
 
+-----------+-------+-----------+------------+--------------+
| Component | Value | Ref Range | Performed  | Pathologist  |
|           |       |           | At         | Signature    |
+-----------+-------+-----------+------------+--------------+
| Albumin,  | 3.7   | 3.5 - 5   | EXTERNAL   |              |
| External  |       |           | LAB        |              |
+-----------+-------+-----------+------------+--------------+
 
 
 
+--------------------------+
| Resulting Agency Comment |
+--------------------------+
|   [facility]              |
+--------------------------+
 
 
 
+----------------+---------+--------------------+--------------+
| Performing     | Address | City/State/Zipcode | Phone Number |
| Organization   |         |                    |              |
+----------------+---------+--------------------+--------------+
|   EXTERNAL LAB |         |                    |              |
+----------------+---------+--------------------+--------------+
 External Lab: Protein, Total (2017)
 
+-----------+-------+-----------+------------+--------------+
| Component | Value | Ref Range | Performed  | Pathologist  |
|           |       |           | At         | Signature    |
+-----------+-------+-----------+------------+--------------+
| Protein,  | 6     | 6 - 8     | EXTERNAL   |              |
| Total,    |       |           | LAB        |              |
| External  |       |           |            |              |
+-----------+-------+-----------+------------+--------------+
 
 
 
+--------------------------+
| Resulting Agency Comment |
+--------------------------+
 
|   [facility]              |
+--------------------------+
 
 
 
+----------------+---------+--------------------+--------------+
| Performing     | Address | City/State/Zipcode | Phone Number |
| Organization   |         |                    |              |
+----------------+---------+--------------------+--------------+
|   EXTERNAL LAB |         |                    |              |
+----------------+---------+--------------------+--------------+
 External Lab: Phosphorus (2017)
 
+-------------+-------+-----------+------------+--------------+
| Component   | Value | Ref Range | Performed  | Pathologist  |
|             |       |           | At         | Signature    |
+-------------+-------+-----------+------------+--------------+
| Phosphorus, | 2.9   | 2.5 - 5   | EXTERNAL   |              |
|  External   |       |           | LAB        |              |
+-------------+-------+-----------+------------+--------------+
 
 
 
+--------------------------+
| Resulting Agency Comment |
+--------------------------+
|   [facility]              |
+--------------------------+
 
 
 
+----------------+---------+--------------------+--------------+
| Performing     | Address | City/State/Zipcode | Phone Number |
| Organization   |         |                    |              |
+----------------+---------+--------------------+--------------+
|   EXTERNAL LAB |         |                    |              |
+----------------+---------+--------------------+--------------+
 External Lab: Magnesium (2017)
 
+-------------+-------+-----------+------------+--------------+
| Component   | Value | Ref Range | Performed  | Pathologist  |
|             |       |           | At         | Signature    |
+-------------+-------+-----------+------------+--------------+
| Magnesium,  | 1.9   | 1.7 - 2.5 | EXTERNAL   |              |
| External    |       |           | LAB        |              |
+-------------+-------+-----------+------------+--------------+
 
 
 
+--------------------------+
| Resulting Agency Comment |
+--------------------------+
|   [facility]              |
+--------------------------+
 
 
 
+----------------+---------+--------------------+--------------+
| Performing     | Address | City/State/Zipcode | Phone Number |
| Organization   |         |                    |              |
 
+----------------+---------+--------------------+--------------+
|   EXTERNAL LAB |         |                    |              |
+----------------+---------+--------------------+--------------+
 External Lab: Calcium (2017)
 
+-----------+-------+------------+------------+--------------+
| Component | Value | Ref Range  | Performed  | Pathologist  |
|           |       |            | At         | Signature    |
+-----------+-------+------------+------------+--------------+
| Calcium,  | 9.9   | 8.4 - 10.2 | EXTERNAL   |              |
| External  |       |            | LAB        |              |
+-----------+-------+------------+------------+--------------+
 
 
 
+--------------------------+
| Resulting Agency Comment |
+--------------------------+
|   [facility]              |
+--------------------------+
 
 
 
+----------------+---------+--------------------+--------------+
| Performing     | Address | City/State/Zipcode | Phone Number |
| Organization   |         |                    |              |
+----------------+---------+--------------------+--------------+
|   EXTERNAL LAB |         |                    |              |
+----------------+---------+--------------------+--------------+
 External Lab: Carbon Dioxide (2017)
 
+-----------+-------+-----------+------------+--------------+
| Component | Value | Ref Range | Performed  | Pathologist  |
|           |       |           | At         | Signature    |
+-----------+-------+-----------+------------+--------------+
| Carbon    | 19    | 19 - 31   | EXTERNAL   |              |
| Dioxide,  |       |           | LAB        |              |
| External  |       |           |            |              |
+-----------+-------+-----------+------------+--------------+
 
 
 
+--------------------------+
| Resulting Agency Comment |
+--------------------------+
|   [facility]              |
+--------------------------+
 
 
 
+----------------+---------+--------------------+--------------+
| Performing     | Address | City/State/Zipcode | Phone Number |
| Organization   |         |                    |              |
+----------------+---------+--------------------+--------------+
|   EXTERNAL LAB |         |                    |              |
+----------------+---------+--------------------+--------------+
 External Lab: Chloride (2017)
 
+------------+-------+-----------+------------+--------------+
| Component  | Value | Ref Range | Performed  | Pathologist  |
 
|            |       |           | At         | Signature    |
+------------+-------+-----------+------------+--------------+
| Chloride,  | 110   | 95 - 112  | EXTERNAL   |              |
| External   |       |           | LAB        |              |
+------------+-------+-----------+------------+--------------+
 
 
 
+--------------------------+
| Resulting Agency Comment |
+--------------------------+
|   [facility]              |
+--------------------------+
 
 
 
+----------------+---------+--------------------+--------------+
| Performing     | Address | City/State/Zipcode | Phone Number |
| Organization   |         |                    |              |
+----------------+---------+--------------------+--------------+
|   EXTERNAL LAB |         |                    |              |
+----------------+---------+--------------------+--------------+
 External Lab: Potassium (2017)
 
+-------------+-------+-----------+------------+--------------+
| Component   | Value | Ref Range | Performed  | Pathologist  |
|             |       |           | At         | Signature    |
+-------------+-------+-----------+------------+--------------+
| Potassium,  | 5.1   | 3.6 - 5.1 | EXTERNAL   |              |
| External    |       |           | LAB        |              |
+-------------+-------+-----------+------------+--------------+
 
 
 
+--------------------------+
| Resulting Agency Comment |
+--------------------------+
|   [facility]              |
+--------------------------+
 
 
 
+----------------+---------+--------------------+--------------+
| Performing     | Address | City/State/Zipcode | Phone Number |
| Organization   |         |                    |              |
+----------------+---------+--------------------+--------------+
|   EXTERNAL LAB |         |                    |              |
+----------------+---------+--------------------+--------------+
 External Lab: Sodium (2017)
 
+-----------+-------+-----------+------------+--------------+
| Component | Value | Ref Range | Performed  | Pathologist  |
|           |       |           | At         | Signature    |
+-----------+-------+-----------+------------+--------------+
| Sodium,   | 142   | 132 - 143 | EXTERNAL   |              |
| External  |       |           | LAB        |              |
+-----------+-------+-----------+------------+--------------+
 
 
 
 
+--------------------------+
| Resulting Agency Comment |
+--------------------------+
|   [facility]              |
+--------------------------+
 
 
 
+----------------+---------+--------------------+--------------+
| Performing     | Address | City/State/Zipcode | Phone Number |
| Organization   |         |                    |              |
+----------------+---------+--------------------+--------------+
|   EXTERNAL LAB |         |                    |              |
+----------------+---------+--------------------+--------------+
 External Lab: Triglycerides (2017)
 
+-------------+---------+-----------+------------+--------------+
| Component   | Value   | Ref Range | Performed  | Pathologist  |
|             |         |           | At         | Signature    |
+-------------+---------+-----------+------------+--------------+
| Triglycerid | 190 (A) | 30 - 150  | EXTERNAL   |              |
| es,         |         |           | LAB        |              |
| External    |         |           |            |              |
+-------------+---------+-----------+------------+--------------+
 
 
 
+-----------------+
| Specimen        |
+-----------------+
| Blood specimen  |
| (specimen)      |
+-----------------+
 
 
 
+--------------------------+
| Resulting Agency Comment |
+--------------------------+
|   [facility]              |
+--------------------------+
 
 
 
+----------------+---------+--------------------+--------------+
| Performing     | Address | City/State/Zipcode | Phone Number |
| Organization   |         |                    |              |
+----------------+---------+--------------------+--------------+
|   EXTERNAL LAB |         |                    |              |
+----------------+---------+--------------------+--------------+
 External Lab: Cholesterol, HDL (2017)
 
+-------------+-------+-----------+------------+--------------+
| Component   | Value | Ref Range | Performed  | Pathologist  |
|             |       |           | At         | Signature    |
+-------------+-------+-----------+------------+--------------+
| HDL         | 50.2  | mg/dl     | EXTERNAL   |              |
| Cholesterol |       |           | LAB        |              |
| , External  |       |           |            |              |
+-------------+-------+-----------+------------+--------------+
 
 
 
 
+-----------------+
| Specimen        |
+-----------------+
| Blood specimen  |
| (specimen)      |
+-----------------+
 
 
 
+--------------------------+
| Resulting Agency Comment |
+--------------------------+
|   [facility]              |
+--------------------------+
 
 
 
+----------------+---------+--------------------+--------------+
| Performing     | Address | City/State/Zipcode | Phone Number |
| Organization   |         |                    |              |
+----------------+---------+--------------------+--------------+
|   EXTERNAL LAB |         |                    |              |
+----------------+---------+--------------------+--------------+
 External Lab: Cholesterol, Total (2017)
 
+-------------+-------+-----------+------------+--------------+
| Component   | Value | Ref Range | Performed  | Pathologist  |
|             |       |           | At         | Signature    |
+-------------+-------+-----------+------------+--------------+
| Cholesterol | 143   | 200 mg/dl | EXTERNAL   |              |
| , Total,    |       |           | LAB        |              |
| External    |       |           |            |              |
+-------------+-------+-----------+------------+--------------+
 
 
 
+-----------------+
| Specimen        |
+-----------------+
| Blood specimen  |
| (specimen)      |
+-----------------+
 
 
 
+--------------------------+
| Resulting Agency Comment |
+--------------------------+
|   [facility]              |
+--------------------------+
 
 
 
+----------------+---------+--------------------+--------------+
| Performing     | Address | City/State/Zipcode | Phone Number |
| Organization   |         |                    |              |
+----------------+---------+--------------------+--------------+
 
|   EXTERNAL LAB |         |                    |              |
+----------------+---------+--------------------+--------------+
 External Lab: Cholesterol, LDL Direct (2017)
 
+-------------+-------+-----------+------------+--------------+
| Component   | Value | Ref Range | Performed  | Pathologist  |
|             |       |           | At         | Signature    |
+-------------+-------+-----------+------------+--------------+
| LDL         | 55    | 100       | EXTERNAL   |              |
| Cholesterol |       |           | LAB        |              |
| , Direct,   |       |           |            |              |
| External    |       |           |            |              |
+-------------+-------+-----------+------------+--------------+
 
 
 
+-----------------+
| Specimen        |
+-----------------+
| Blood specimen  |
| (specimen)      |
+-----------------+
 
 
 
+--------------------------+
| Resulting Agency Comment |
+--------------------------+
|   [facility]              |
+--------------------------+
 
 
 
+----------------+---------+--------------------+--------------+
| Performing     | Address | City/State/Zipcode | Phone Number |
| Organization   |         |                    |              |
+----------------+---------+--------------------+--------------+
|   EXTERNAL LAB |         |                    |              |
+----------------+---------+--------------------+--------------+
 External Lab: Cholesterol, LDL (2017)
 
+-------------+-------+-----------+------------+--------------+
| Component   | Value | Ref Range | Performed  | Pathologist  |
|             |       |           | At         | Signature    |
+-------------+-------+-----------+------------+--------------+
| LDL         | 55    |           | EXTERNAL   |              |
| Cholesterol |       |           | LAB        |              |
| , Direct,   |       |           |            |              |
| External    |       |           |            |              |
+-------------+-------+-----------+------------+--------------+
 
 
 
+-----------------+
| Specimen        |
+-----------------+
| Blood specimen  |
| (specimen)      |
+-----------------+
 
 
 
 
+--------------------------+
| Resulting Agency Comment |
+--------------------------+
|   [facility]              |
+--------------------------+
 
 
 
+----------------+---------+--------------------+--------------+
| Performing     | Address | City/State/Zipcode | Phone Number |
| Organization   |         |                    |              |
+----------------+---------+--------------------+--------------+
|   EXTERNAL LAB |         |                    |              |
+----------------+---------+--------------------+--------------+
 External Lab: eGFR (2017)
 
+-----------+-------+-----------+------------+--------------+
| Component | Value | Ref Range | Performed  | Pathologist  |
|           |       |           | At         | Signature    |
+-----------+-------+-----------+------------+--------------+
| eGFR,     | 37    |           | EXTERNAL   |              |
| External  |       |           | LAB        |              |
+-----------+-------+-----------+------------+--------------+
 
 
 
+-----------------+
| Specimen        |
+-----------------+
| Blood specimen  |
| (specimen)      |
+-----------------+
 
 
 
+--------------------------+
| Resulting Agency Comment |
+--------------------------+
|   [facility]              |
+--------------------------+
 
 
 
+----------------+---------+--------------------+--------------+
| Performing     | Address | City/State/Zipcode | Phone Number |
| Organization   |         |                    |              |
+----------------+---------+--------------------+--------------+
|   EXTERNAL LAB |         |                    |              |
+----------------+---------+--------------------+--------------+
 External Lab: Creatinine (2017)
 
+-------------+---------+------------+------------+--------------+
| Component   | Value   | Ref Range  | Performed  | Pathologist  |
|             |         |            | At         | Signature    |
+-------------+---------+------------+------------+--------------+
| Creatinine, | 1.4 (A) | 0.7 - 1.18 | EXTERNAL   |              |
|  External   |         |            | LAB        |              |
+-------------+---------+------------+------------+--------------+
 
 
 
 
+-----------------+
| Specimen        |
+-----------------+
| Blood specimen  |
| (specimen)      |
+-----------------+
 
 
 
+--------------------------+
| Resulting Agency Comment |
+--------------------------+
|   [facility]              |
+--------------------------+
 
 
 
+----------------+---------+--------------------+--------------+
| Performing     | Address | City/State/Zipcode | Phone Number |
| Organization   |         |                    |              |
+----------------+---------+--------------------+--------------+
|   EXTERNAL LAB |         |                    |              |
+----------------+---------+--------------------+--------------+
 documented in this encounter
 
 Visit Diagnoses
 Not on filedocumented in this encounter

## 2020-01-08 NOTE — XMS
Encounter Summary
  Created on: 2020
 
 Viviana Ricci
 External Reference #: [id number]
 : 47
 Sex: Female
 
 Demographics
 
 
+-----------------------+----------------------+
| Address               | [address]      |
|                       | [place]  [address] |
+-----------------------+----------------------+
| Home Phone            | [phone]      |
+-----------------------+----------------------+
| Preferred Language    | Unknown              |
+-----------------------+----------------------+
| Marital Status        | Single               |
+-----------------------+----------------------+
| Druze Affiliation | 1009                 |
+-----------------------+----------------------+
| Race                  | Unknown              |
+-----------------------+----------------------+
| Ethnic Group          | Unknown              |
+-----------------------+----------------------+
 
 
 Author
 
 
+--------------+--------------------------------------------+
| Author       | [facility]  |
|              | and [place]                                |
+--------------+--------------------------------------------+
| Organization | [facility] [place]  |
|              | and [place]                                |
+--------------+--------------------------------------------+
| Address      | Unknown                                    |
+--------------+--------------------------------------------+
| Phone        | Unavailable                                |
+--------------+--------------------------------------------+
 
 
 
 Support
 
 
+----------------+--------------+---------------------+-----------------+
| Name           | Relationship | Address             | Phone           |
+----------------+--------------+---------------------+-----------------+
| Ada/Ed Radhames | ECON         | [address]              | [phone] |
|                |              | [place]  |                 |
|                |              |  [address]              |                 |
+----------------+--------------+---------------------+-----------------+
 
 
 
 
 Care Team Providers
 
 
+-----------------------+------+-------------+
| Care Team Member Name | Role | Phone       |
+-----------------------+------+-------------+
 PCP  | Unavailable |
+-----------------------+------+-------------+
 
 
 
 Encounter Details
 
 
+--------+----------+---------------------+----------------------+-------------+
| Date   | Type     | Department          | Care Team            | Description |
+--------+----------+---------------------+----------------------+-------------+
| 06/15/ | Abstract |   [facility] [place]         |   Marzena Moser  |             |
|    |          | NEPHROLOGY  [address]   | M, DO  [address]      |             |
|        |          | [address]   | [address]      |             |
|        |          | [place], [place]     | [place]      |             |
|        |          | [address]          | [address]  [phone]  |             |
|        |          | [phone]        |  [phone] (Fax)  |             |
+--------+----------+---------------------+----------------------+-------------+
 
 
 
 Social History
 
 
+--------------+-------+-----------+--------+------+
| Tobacco Use  | Types | Packs/Day | Years  | Date |
|              |       |           | Used   |      |
+--------------+-------+-----------+--------+------+
| Never Smoker |       |           |        |      |
+--------------+-------+-----------+--------+------+
 
 
 
+---------------------+---+---+---+
| Smokeless Tobacco:  |   |   |   |
| Never Used          |   |   |   |
+---------------------+---+---+---+
 
 
 
+---------------------------------------------------------------+
| Comments: some second hand smoke exposure, but fairly minimal |
+---------------------------------------------------------------+
 
 
 
+-------------+-------------+---------+----------+
| Alcohol Use | Drinks/Week | oz/Week | Comments |
+-------------+-------------+---------+----------+
| No          |             |         |          |
+-------------+-------------+---------+----------+
 
 
 
 
+------------------+---------------+
| Sex Assigned at  | Date Recorded |
| Birth            |               |
+------------------+---------------+
| Not on file      |               |
+------------------+---------------+
 
 
 
+----------------+-------------+-------------+
| Job Start Date | Occupation  | Industry    |
+----------------+-------------+-------------+
| Not on file    | Not on file | Not on file |
+----------------+-------------+-------------+
 
 
 
+----------------+--------------+------------+
| Travel History | Travel Start | Travel End |
+----------------+--------------+------------+
 
 
 
+-------------------------------------+
| No recent travel history available. |
+-------------------------------------+
 documented as of this encounter
 
 Plan of Treatment
 
 
+--------+-----------+------------+----------------------+-------------------+
| Date   | Type      | Specialty  | Care Team            | Description       |
+--------+-----------+------------+----------------------+-------------------+
| / | Office    | Cardiology |   Tonia Wilson,    |                   |
|    | Visit     |            | ARNJESSICA  [address]   |                   |
|        |           |            | [place]  |                   |
|        |           |            | [address]  [phone]  |                   |
|        |           |            |  [phone] (Fax)  |                   |
+--------+-----------+------------+----------------------+-------------------+
| / | Hospital  | Radiology  |   Popeye Townsend, |                   |
|    | Encounter |            |  MD  [address] |                   |
|        |           |            |  St.  [place],   |                   |
|        |           |            | [place] [address]             |                   |
|        |           |            | [phone]         |                   |
|        |           |            | [phone] (Fax)   |                   |
+--------+-----------+------------+----------------------+-------------------+
| / | Surgery   | Radiology  |   Popeye Townsend, | CV EP PPM SYSTEM  |
|    |           |            |  MD  [address] | IMPLANT           |
|        |           |            |  St.  [place],   |                   |
|        |           |            | [place] [address]             |                   |
|        |           |            | [phone]         |                   |
|        |           |            | [phone] (Fax)   |                   |
+--------+-----------+------------+----------------------+-------------------+
| 02/10/ | Clinical  | Cardiology |                      |                   |
|    | Support   |            |                      |                   |
+--------+-----------+------------+----------------------+-------------------+
| / | Office    | Cardiology |   Tonia Wilson,    |                   |
|    | Visit     |            | ARNP  [address]   |                   |
 
|        |           |            | St  [place], [place]  |                   |
|        |           |            | [address]  [phone]  |                   |
|        |           |            |  [phone] (Fax)  |                   |
+--------+-----------+------------+----------------------+-------------------+
| / | Off-Site  | Nephrology |   Marzena Moser  |                   |
|    | Visit     |            | DO ETIENNE  [address] [place] [address]      |                   |
|        |           |            | [address]      |                   |
|        |           |            | [place]      |                   |
|        |           |            | [address]  [phone]  |                   |
|        |           |            |  [phone] (Fax)  |                   |
+--------+-----------+------------+----------------------+-------------------+
 documented as of this encounter
 
 Procedures
 
 
+--------------------+--------+------------+----------------------+----------------------+
| Procedure Name     | Priori | Date/Time  | Associated Diagnosis | Comments             |
|                    | ty     |            |                      |                      |
+--------------------+--------+------------+----------------------+----------------------+
| EXTERNAL LAB:      | Routin | 2015 |                      |   Results for this   |
| TACROLIMUS LEVEL,  | e      |            |                      | procedure are in the |
| [facility]               |        |            |                      |  results section.    |
+--------------------+--------+------------+----------------------+----------------------+
 documented in this encounter
 
 Results
 External Lab: Tacrolimus Level, [facility] (2015)
 
+-------------+-------+-----------+------------+--------------+
| Component   | Value | Ref Range | Performed  | Pathologist  |
|             |       |           | At         | Signature    |
+-------------+-------+-----------+------------+--------------+
| Tacrolimus, | 6.3   |           |            |              |
|  CMIA,      |       |           |            |              |
| External    |       |           |            |              |
+-------------+-------+-----------+------------+--------------+
 
 
 
+----------+
| Specimen |
+----------+
|          |
+----------+
 documented in this encounter
 
 Visit Diagnoses
 Not on filedocumented in this encounter

## 2020-01-08 NOTE — XMS
Encounter Summary
  Created on: 2020
 
 Viviana Ricci
 External Reference #: [id number]
 : 47
 Sex: Female
 
 Demographics
 
 
+-----------------------+----------------------+
| Address               | [address]      |
|                       | [place]  [address] |
+-----------------------+----------------------+
| Home Phone            | [phone]      |
+-----------------------+----------------------+
| Preferred Language    | Unknown              |
+-----------------------+----------------------+
| Marital Status        | Single               |
+-----------------------+----------------------+
| Caodaism Affiliation | 1009                 |
+-----------------------+----------------------+
| Race                  | Unknown              |
+-----------------------+----------------------+
| Ethnic Group          | Unknown              |
+-----------------------+----------------------+
 
 
 Author
 
 
+--------------+--------------------------------------------+
| Author       | [facility] [place]  |
|              | and [place]                                |
+--------------+--------------------------------------------+
| Organization | [facility] [place]  |
|              | and [place]                                |
+--------------+--------------------------------------------+
| Address      | Unknown                                    |
+--------------+--------------------------------------------+
| Phone        | Unavailable                                |
+--------------+--------------------------------------------+
 
 
 
 Support
 
 
+----------------+--------------+---------------------+-----------------+
| Name           | Relationship | Address             | Phone           |
+----------------+--------------+---------------------+-----------------+
| Ada/Ed Radhames | ECON         | [address]              | [phone] |
|                |              | [place]  |                 |
|                |              |  [address]              |                 |
+----------------+--------------+---------------------+-----------------+
 
 
 
 
 Care Team Providers
 
 
+-----------------------+------+-------------+
| Care Team Member Name | Role | Phone       |
+-----------------------+------+-------------+
 PCP  | Unavailable |
+-----------------------+------+-------------+
 
 
 
 Reason for Visit
 
 
+-------------------+----------+
| Reason            | Comments |
+-------------------+----------+
| Medication Refill |          |
+-------------------+----------+
 
 
 
 Encounter Details
 
 
+--------+--------+---------------------+----------------------+-------------------+
| Date   | Type   | Department          | Care Team            | Description       |
+--------+--------+---------------------+----------------------+-------------------+
| / | Refill |   [place]         |   Marzena Moser  | Medication Refill |
|    |        | NEPHROLOGY  [address]   | M, DO  [address]      |                   |
|        |        | [address]   | [address]      |                   |
|        |        | [place], [place]     | [place], [place]      |                   |
|        |        | [address]          | [address]  [phone]  |                   |
|        |        | [phone]        |  [phone] (Fax)  |                   |
+--------+--------+---------------------+----------------------+-------------------+
 
 
 
 Social History
 
 
+--------------+-------+-----------+--------+------+
| Tobacco Use  | Types | Packs/Day | Years  | Date |
|              |       |           | Used   |      |
+--------------+-------+-----------+--------+------+
| Never Smoker |       |           |        |      |
+--------------+-------+-----------+--------+------+
 
 
 
+---------------------+---+---+---+
| Smokeless Tobacco:  |   |   |   |
| Never Used          |   |   |   |
+---------------------+---+---+---+
 
 
 
+---------------------------------------------------------------+
| Comments: some second hand smoke exposure, but fairly minimal |
 
+---------------------------------------------------------------+
 
 
 
+-------------+-------------+---------+----------+
| Alcohol Use | Drinks/Week | oz/Week | Comments |
+-------------+-------------+---------+----------+
| No          |             |         |          |
+-------------+-------------+---------+----------+
 
 
 
+------------------+---------------+
| Sex Assigned at  | Date Recorded |
| Birth            |               |
+------------------+---------------+
| Not on file      |               |
+------------------+---------------+
 
 
 
+----------------+-------------+-------------+
| Job Start Date | Occupation  | Industry    |
+----------------+-------------+-------------+
| Not on file    | Not on file | Not on file |
+----------------+-------------+-------------+
 
 
 
+----------------+--------------+------------+
| Travel History | Travel Start | Travel End |
+----------------+--------------+------------+
 
 
 
+-------------------------------------+
| No recent travel history available. |
+-------------------------------------+
 documented as of this encounter
 
 Plan of Treatment
 
 
+--------+-----------+------------+----------------------+-------------------+
| Date   | Type      | Specialty  | Care Team            | Description       |
+--------+-----------+------------+----------------------+-------------------+
| / | Office    | Cardiology |   HellalfredoTonia,    |                   |
|    | Visit     |            | ARNP  [address]   |                   |
|        |           |            | St  [place], [place]  |                   |
|        |           |            | [address]  [phone]  |                   |
|        |           |            |  [phone] (Fax)  |                   |
+--------+-----------+------------+----------------------+-------------------+
| / | Hospital  | Radiology  |   Popeye Townsend, |                   |
|    | Encounter |            |  MD  [address] |                   |
|        |           |            |  St.  [place],   |                   |
|        |           |            | [place] [address]             |                   |
|        |           |            | [phone]         |                   |
|        |           |            | [phone] (Fax)   |                   |
+--------+-----------+------------+----------------------+-------------------+
| / | Surgery   | Radiology  |   Popeye Townsend, | CV EP PPM SYSTEM  |
 
|    |           |            |  MD  [address] | IMPLANT           |
|        |           |            |  St.  [place],   |                   |
|        |           |            | [place] [address]             |                   |
|        |           |            | [phone]         |                   |
|        |           |            | [phone] (Fax)   |                   |
+--------+-----------+------------+----------------------+-------------------+
| 02/10/ | Clinical  | Cardiology |                      |                   |
|    | Support   |            |                      |                   |
+--------+-----------+------------+----------------------+-------------------+
| / | Office    | Cardiology |   Tonia Wilson,    |                   |
|    | Visit     |            | ARNP  [address]   |                   |
|        |           |            | [place]  |                   |
|        |           |            | [address]  [phone]  |                   |
|        |           |            |  [phone] (Fax)  |                   |
+--------+-----------+------------+----------------------+-------------------+
| / | Off-Site  | Nephrology |   Marzena Moser  |                   |
|    | Visit     |            | DO ETIENNE  [address] [place] [address]      |                   |
|        |           |            | [address]      |                   |
|        |           |            | [place]      |                   |
|        |           |            | [address]  [phone]  |                   |
|        |           |            |  [phone] (Fax)  |                   |
+--------+-----------+------------+----------------------+-------------------+
 documented as of this encounter
 
 Visit Diagnoses
 Not on filedocumented in this encounter

## 2020-01-08 NOTE — XMS
Encounter Summary
  Created on: 2020
 
 Viviana Ricci
 External Reference #: [id number]
 : 47
 Sex: Female
 
 Demographics
 
 
+-----------------------+----------------------+
| Address               | [address]      |
|                       | [place]  [address] |
+-----------------------+----------------------+
| Home Phone            | [phone]      |
+-----------------------+----------------------+
| Preferred Language    | Unknown              |
+-----------------------+----------------------+
| Marital Status        | Single               |
+-----------------------+----------------------+
| Episcopal Affiliation | 1009                 |
+-----------------------+----------------------+
| Race                  | Unknown              |
+-----------------------+----------------------+
| Ethnic Group          | Unknown              |
+-----------------------+----------------------+
 
 
 Author
 
 
+--------------+--------------------------------------------+
| Author       | [facility]  |
|              | and [place]                                |
+--------------+--------------------------------------------+
| Organization | [facility]  |
|              | and [place]                                |
+--------------+--------------------------------------------+
| Address      | Unknown                                    |
+--------------+--------------------------------------------+
| Phone        | Unavailable                                |
+--------------+--------------------------------------------+
 
 
 
 Support
 
 
+----------------+--------------+---------------------+-----------------+
| Name           | Relationship | Address             | Phone           |
+----------------+--------------+---------------------+-----------------+
| Ada/Ed Radhames | ECON         | [place]              | [phone] |
|                |              | [place]  |                 |
|                |              |  [id number]              |                 |
+----------------+--------------+---------------------+-----------------+
 
 
 
 
 Care Team Providers
 
 
+-----------------------+------+-------------+
| Care Team Member Name | Role | Phone       |
+-----------------------+------+-------------+
 PCP  | Unavailable |
+-----------------------+------+-------------+
 
 
 
 Reason for Visit
 
 
+----------------+----------+
| Reason         | Comments |
+----------------+----------+
| Urinary Tract  |          |
| Infection      |          |
+----------------+----------+
 
 
 
 Encounter Details
 
 
+--------+-----------+---------------------+----------------------+----------------+
| Date   | Type      | Department          | Care Team            | Description    |
+--------+-----------+---------------------+----------------------+----------------+
| 08/10/ | Telephone |   [facility]         |   Marzena Moser  | Urinary Tract  |
| 2018   |           | NEPHROLOGY  [address]   | M, DO  [address]      | Infection      |
|        |           | [address]   | [address]      |                |
|        |           | [place], [place]     | [place], [place]      |                |
|        |           | [address]          | [address]  [phone]  |                |
|        |           | [phone]        |  [phone] (Fax)  |                |
+--------+-----------+---------------------+----------------------+----------------+
 
 
 
 Social History
 
 
+--------------+-------+-----------+--------+------+
| Tobacco Use  | Types | Packs/Day | Years  | Date |
|              |       |           | Used   |      |
+--------------+-------+-----------+--------+------+
| Never Smoker |       |           |        |      |
+--------------+-------+-----------+--------+------+
 
 
 
+---------------------+---+---+---+
| Smokeless Tobacco:  |   |   |   |
| Never Used          |   |   |   |
+---------------------+---+---+---+
 
 
 
+---------------------------------------------------------------+
 
| Comments: some second hand smoke exposure, but fairly minimal |
+---------------------------------------------------------------+
 
 
 
+-------------+-------------+---------+----------+
| Alcohol Use | Drinks/Week | oz/Week | Comments |
+-------------+-------------+---------+----------+
| No          |             |         |          |
+-------------+-------------+---------+----------+
 
 
 
+------------------+---------------+
| Sex Assigned at  | Date Recorded |
| Birth            |               |
+------------------+---------------+
| Not on file      |               |
+------------------+---------------+
 
 
 
+----------------+-------------+-------------+
| Job Start Date | Occupation  | Industry    |
+----------------+-------------+-------------+
| Not on file    | Not on file | Not on file |
+----------------+-------------+-------------+
 
 
 
+----------------+--------------+------------+
| Travel History | Travel Start | Travel End |
+----------------+--------------+------------+
 
 
 
+-------------------------------------+
| No recent travel history available. |
+-------------------------------------+
 documented as of this encounter
 
 Plan of Treatment
 
 
+--------+-----------+------------+----------------------+-------------------+
| Date   | Type      | Specialty  | Care Team            | Description       |
+--------+-----------+------------+----------------------+-------------------+
| / | Office    | Cardiology |   Tonia Wilson,    |                   |
|    | Visit     |            | ARNJESSICA  [address]   |                   |
|        |           |            | St  [place] [facility]  |                   |
|        |           |            | [address]  [phone]  |                   |
|        |           |            |  [phone] (Fax)  |                   |
+--------+-----------+------------+----------------------+-------------------+
| / | Hospital  | Radiology  |   Popeye Townsend, |                   |
|    | Encounter |            |  MD  [address] |                   |
|        |           |            |  [place],   |                   |
|        |           |            | [place] [address]             |                   |
|        |           |            | [phone]         |                   |
|        |           |            | [phone] (Fax)   |                   |
+--------+-----------+------------+----------------------+-------------------+
 
| / | Surgery   | Radiology  |   Popeye Townsend, | CV EP PPM SYSTEM  |
|    |           |            |  MD  [address] | IMPLANT           |
|        |           |            |  St.  [place],   |                   |
|        |           |            | [place] [address]             |                   |
|        |           |            | [phone]         |                   |
|        |           |            | [phone] (Fax)   |                   |
+--------+-----------+------------+----------------------+-------------------+
| 02/10/ | Clinical  | Cardiology |                      |                   |
|    | Support   |            |                      |                   |
+--------+-----------+------------+----------------------+-------------------+
| / | Office    | Cardiology |   Tonia Wilson,    |                   |
|    | Visit     |            | ARNP  [address]   |                   |
|        |           |            | [place]  |                   |
|        |           |            | [address]  [phone]  |                   |
|        |           |            |  [phone] (Fax)  |                   |
+--------+-----------+------------+----------------------+-------------------+
| / | Off-Site  | Nephrology |   Marzena Moser  |                   |
|    | Visit     |            | DO ETIENNE  [address] [place] [address]      |                   |
|        |           |            | [address]      |                   |
|        |           |            | [place]      |                   |
|        |           |            | [address] [phone]  |                   |
|        |           |            |  [phone] (Fax)  |                   |
+--------+-----------+------------+----------------------+-------------------+
 documented as of this encounter
 
 Visit Diagnoses
 Not on filedocumented in this encounter

## 2020-01-08 NOTE — XMS
Encounter Summary
  Created on: 2020
 
 Viviana Ricci
 External Reference #: [id number]
 : 47
 Sex: Female
 
 Demographics
 
 
+-----------------------+----------------------+
| Address               | [address]      |
|                       | [place]  [address] |
+-----------------------+----------------------+
| Home Phone            | [phone]      |
+-----------------------+----------------------+
| Preferred Language    | Unknown              |
+-----------------------+----------------------+
| Marital Status        | Single               |
+-----------------------+----------------------+
| Anabaptism Affiliation | 1009                 |
+-----------------------+----------------------+
| Race                  | Unknown              |
+-----------------------+----------------------+
| Ethnic Group          | Unknown              |
+-----------------------+----------------------+
 
 
 Author
 
 
+--------------+--------------------------------------------+
| Author       | [facility]  |
|              | and [place]                                |
+--------------+--------------------------------------------+
| Organization | [facility]  |
|              | and [place]                                |
+--------------+--------------------------------------------+
| Address      | Unknown                                    |
+--------------+--------------------------------------------+
| Phone        | Unavailable                                |
+--------------+--------------------------------------------+
 
 
 
 Support
 
 
+----------------+--------------+---------------------+-----------------+
| Name           | Relationship | Address             | Phone           |
+----------------+--------------+---------------------+-----------------+
| Ada/Ed Radhames | ECON         | [address]              | [phone] |
|                |              | [place], [place]  |                 |
|                |              |  [address]              |                 |
+----------------+--------------+---------------------+-----------------+
 
 
 
 
 Care Team Providers
 
 
+-----------------------+------+-------------+
| Care Team Member Name | Role | Phone       |
+-----------------------+------+-------------+
 PCP  | Unavailable |
+-----------------------+------+-------------+
 
 
 
 Encounter Details
 
 
+--------+-----------+----------------------+----------------------+-------------+
| Date   | Type      | Department           | Care Team            | Description |
+--------+-----------+----------------------+----------------------+-------------+
| / | Hospital  |   [facility] |   Edgar Sanders,   |             |
|    | Encounter |  [facility] OP | MD  [address]      |             |
|        |           |   [address]       | [place]      |             |
|        |           | [place]      | [address]  [phone]  |             |
|        |           | [address]           |  [phone] (Fax)  |             |
|        |           | [phone]         |                      |             |
+--------+-----------+----------------------+----------------------+-------------+
 
 
 
 Social History
 
 
+----------------+-------+-----------+--------+------+
| Tobacco Use    | Types | Packs/Day | Years  | Date |
|                |       |           | Used   |      |
+----------------+-------+-----------+--------+------+
| Never Assessed |       |           |        |      |
+----------------+-------+-----------+--------+------+
 
 
 
+------------------+---------------+
| Sex Assigned at  | Date Recorded |
| Birth            |               |
+------------------+---------------+
| Not on file      |               |
+------------------+---------------+
 
 
 
+----------------+-------------+-------------+
| Job Start Date | Occupation  | Industry    |
+----------------+-------------+-------------+
| Not on file    | Not on file | Not on file |
+----------------+-------------+-------------+
 
 
 
+----------------+--------------+------------+
| Travel History | Travel Start | Travel End |
+----------------+--------------+------------+
 
 
 
 
+-------------------------------------+
| No recent travel history available. |
+-------------------------------------+
 documented as of this encounter
 
 Plan of Treatment
 
 
+--------+-----------+------------+----------------------+-------------------+
| Date   | Type      | Specialty  | Care Team            | Description       |
+--------+-----------+------------+----------------------+-------------------+
| / | Office    | Cardiology |   Tonia Wilson,    |                   |
|    | Visit     |            | ARNP  [address]   |                   |
|        |           |            | St  [place], [place]  |                   |
|        |           |            | [address]  [phone]  |                   |
|        |           |            |  [phone] (Fax)  |                   |
+--------+-----------+------------+----------------------+-------------------+
| / | Hospital  | Radiology  |   Popeye Townsend, |                   |
|    | Encounter |            |  MD  [address] |                   |
|        |           |            |  [place],   |                   |
|        |           |            | [place] [address]             |                   |
|        |           |            | [phone]         |                   |
|        |           |            | [phone] (Fax)   |                   |
+--------+-----------+------------+----------------------+-------------------+
| / | Surgery   | Radiology  |   Popeye Townsend, | CV EP PPM SYSTEM  |
|    |           |            |  MD  [address] | IMPLANT           |
|        |           |            |  [place],   |                   |
|        |           |            | [place] [address]             |                   |
|        |           |            | [phone]         |                   |
|        |           |            | [phone] (Fax)   |                   |
+--------+-----------+------------+----------------------+-------------------+
| 02/10/ | Clinical  | Cardiology |                      |                   |
|    | Support   |            |                      |                   |
+--------+-----------+------------+----------------------+-------------------+
| / | Office    | Cardiology |   Tonia Wilson,    |                   |
|    | Visit     |            | ARNP  [address]   |                   |
|        |           |            | [place]  |                   |
|        |           |            | [address]  [phone]  |                   |
|        |           |            |  [phone] (Fax)  |                   |
+--------+-----------+------------+----------------------+-------------------+
| / | Off-Site  | Nephrology |   Marzena Moser  |                   |
|    | Visit     |            | DO ETIENNE  [address] [place] [address]      |                   |
|        |           |            | [address]      |                   |
|        |           |            | [place]      |                   |
|        |           |            | [address] [phone]  |                   |
|        |           |            |  [phone] (Fax)  |                   |
+--------+-----------+------------+----------------------+-------------------+
 documented as of this encounter
 
 Visit Diagnoses
 Not on filedocumented in this encounter

## 2020-01-08 NOTE — XMS
Clinical Summary
  Created on: 2020
 
 Viviana Ricci
 External Reference #: [id number]
 : 47
 Sex: Female
 
 Demographics
 
 
+-----------------------+----------------------+
| Address               | [address]      |
|                       | [place]  [address] |
+-----------------------+----------------------+
| Home Phone            | [phone]      |
+-----------------------+----------------------+
| Preferred Language    | Unknown              |
+-----------------------+----------------------+
| Marital Status        | Single               |
+-----------------------+----------------------+
| [employer] Affiliation | Unknown              |
+-----------------------+----------------------+
| Race                  | Unknown              |
+-----------------------+----------------------+
| Ethnic Group          | Unknown              |
+-----------------------+----------------------+
 
 
 Author
 
 
+--------------+------------------------------------------+
| Author       | [facility] [employer] [facility]  |
|              | 19)                                 |
+--------------+------------------------------------------+
| Organization | [facility] [employer] [facility]  |
|              | 19)                                 |
+--------------+------------------------------------------+
| Address      | Unknown                                  |
+--------------+------------------------------------------+
| Phone        | Unavailable                              |
+--------------+------------------------------------------+
 
 
 
 Support
 
 
+----------+--------------+---------+-----------------+
| Name     | Relationship | Address | Phone           |
+----------+--------------+---------+-----------------+
| Tereso Ricci | ECON         | Unknown | [phone] |
+----------+--------------+---------+-----------------+
 
 
 
 Care Team Providers
 
 
 
+------------------------+------+-----------------+
| Care Team Member Name  | Role | Phone           |
+------------------------+------+-----------------+
| Marzena Moser DO | PP   | [phone] |
+------------------------+------+-----------------+
 
 
 
 Allergies
 Not on File
 
 Current Medications
 Not on file
 
 Active Problems
 Not on file
 
 Social History
 
 
+----------------+-------+-----------+--------+------+
| Tobacco Use    | Types | Packs/Day | Years  | Date |
|                |       |           | Used   |      |
+----------------+-------+-----------+--------+------+
| Never Assessed |       |           |        |      |
+----------------+-------+-----------+--------+------+
 
 
 
+------------------+---------------+
| Sex Assigned at  | Date Recorded |
| Birth            |               |
+------------------+---------------+
| Not on file      |               |
+------------------+---------------+
 
 
 
 Plan of Treatment
 Not on file
 
 Results
 Not on filefrom Last 3 Months

## 2020-01-08 NOTE — XMS
Encounter Summary
  Created on: 2020
 
 Viviana Ricci
 External Reference #: [id number]
 : 47
 Sex: Female
 
 Demographics
 
 
+-----------------------+----------------------+
| Address               | [address]      |
|                       | [place]  [address] |
+-----------------------+----------------------+
| Home Phone            | [phone]      |
+-----------------------+----------------------+
| Preferred Language    | Unknown              |
+-----------------------+----------------------+
| Marital Status        | Single               |
+-----------------------+----------------------+
| Sabianism Affiliation | [id number]                 |
+-----------------------+----------------------+
| Race                  | Unknown              |
+-----------------------+----------------------+
| Ethnic Group          | Unknown              |
+-----------------------+----------------------+
 
 
 Author
 
 
+--------------+--------------------------------------------+
| Author       | [facility]  |
|              | and [place]                                |
+--------------+--------------------------------------------+
| Organization | [facility]  |
|              | and [place]                                |
+--------------+--------------------------------------------+
| Address      | Unknown                                    |
+--------------+--------------------------------------------+
| Phone        | Unavailable                                |
+--------------+--------------------------------------------+
 
 
 
 Support
 
 
+----------------+--------------+---------------------+-----------------+
| Name           | Relationship | Address             | Phone           |
+----------------+--------------+---------------------+-----------------+
| Ada/Ed Radhames | ECON         | [address]              | [phone] |
|                |              | [place]  |                 |
|                |              |  [address]              |                 |
+----------------+--------------+---------------------+-----------------+
 
 
 
 
 Care Team Providers
 
 
+-----------------------+------+-------------+
| Care Team Member Name | Role | Phone       |
+-----------------------+------+-------------+
 PCP  | Unavailable |
+-----------------------+------+-------------+
 
 
 
 Encounter Details
 
 
+--------+----------+---------------------+----------------------+-------------+
| Date   | Type     | Department          | Care Team            | Description |
+--------+----------+---------------------+----------------------+-------------+
| / | Abstract |   [facility]         |   Marzena Moser  |             |
|    |          | NEPHROLOGY  [address]   | M, DO  [address]      |             |
|        |          | [address]   | [address]      |             |
|        |          | [place], [place]     | [place]      |             |
|        |          | [address]          | [address]  [phone]  |             |
|        |          | [phone]        |  [phone] (Fax)  |             |
+--------+----------+---------------------+----------------------+-------------+
 
 
 
 Social History
 
 
+--------------+-------+-----------+--------+------+
| Tobacco Use  | Types | Packs/Day | Years  | Date |
|              |       |           | Used   |      |
+--------------+-------+-----------+--------+------+
| Never Smoker |       |           |        |      |
+--------------+-------+-----------+--------+------+
 
 
 
+---------------------+---+---+---+
| Smokeless Tobacco:  |   |   |   |
| Never Used          |   |   |   |
+---------------------+---+---+---+
 
 
 
+---------------------------------------------------------------+
| Comments: some second hand smoke exposure, but fairly minimal |
+---------------------------------------------------------------+
 
 
 
+-------------+-------------+---------+----------+
| Alcohol Use | Drinks/Week | oz/Week | Comments |
+-------------+-------------+---------+----------+
| No          |             |         |          |
+-------------+-------------+---------+----------+
 
 
 
 
+------------------+---------------+
| Sex Assigned at  | Date Recorded |
| Birth            |               |
+------------------+---------------+
| Not on file      |               |
+------------------+---------------+
 
 
 
+----------------+-------------+-------------+
| Job Start Date | Occupation  | Industry    |
+----------------+-------------+-------------+
| Not on file    | Not on file | Not on file |
+----------------+-------------+-------------+
 
 
 
+----------------+--------------+------------+
| Travel History | Travel Start | Travel End |
+----------------+--------------+------------+
 
 
 
+-------------------------------------+
| No recent travel history available. |
+-------------------------------------+
 documented as of this encounter
 
 Plan of Treatment
 
 
+--------+-----------+------------+----------------------+-------------------+
| Date   | Type      | Specialty  | Care Team            | Description       |
+--------+-----------+------------+----------------------+-------------------+
| / | Office    | Cardiology |   Tonia Wilson,    |                   |
|    | Visit     |            | ARNJESSICA  [address]   |                   |
|        |           |            | [place]  |                   |
|        |           |            | [address]  [phone]  |                   |
|        |           |            |  [phone] (Fax)  |                   |
+--------+-----------+------------+----------------------+-------------------+
| / | Hospital  | Radiology  |   Popeye Townsend, |                   |
|    | Encounter |            |  MD  [address] |                   |
|        |           |            |  St.  [place],   |                   |
|        |           |            | [place] [address]             |                   |
|        |           |            | [phone]         |                   |
|        |           |            | [phone] (Fax)   |                   |
+--------+-----------+------------+----------------------+-------------------+
| / | Surgery   | Radiology  |   Popeye Townsend, | CV EP PPM SYSTEM  |
|    |           |            |  MD  [address] | IMPLANT           |
|        |           |            |  St.  [place],   |                   |
|        |           |            | [place] [address]             |                   |
|        |           |            | [phone]         |                   |
|        |           |            | [phone] (Fax)   |                   |
+--------+-----------+------------+----------------------+-------------------+
| 02/10/ | Clinical  | Cardiology |                      |                   |
|    | Support   |            |                      |                   |
+--------+-----------+------------+----------------------+-------------------+
| / | Office    | Cardiology |   Tonia Wilson,    |                   |
|    | Visit     |            | ARNP  [address]   |                   |
 
|        |           |            | St  [place], [place]  |                   |
|        |           |            | [address]  [phone]  |                   |
|        |           |            |  [phone] (Fax)  |                   |
+--------+-----------+------------+----------------------+-------------------+
| / | Off-Site  | Nephrology |   Marzena Moser  |                   |
|    | Visit     |            | DO ETIENNE  [address] [place] [address]      |                   |
|        |           |            | [address]      |                   |
|        |           |            | [place]      |                   |
|        |           |            | [address]  [phone]  |                   |
|        |           |            |  [phone] (Fax)  |                   |
+--------+-----------+------------+----------------------+-------------------+
 documented as of this encounter
 
 Procedures
 
 
+----------------+--------+------------+----------------------+----------------------+
| Procedure Name | Priori | Date/Time  | Associated Diagnosis | Comments             |
|                | ty     |            |                      |                      |
+----------------+--------+------------+----------------------+----------------------+
| EXTERNAL LAB:  | Routin | 2015 |                      |   Results for this   |
| URINALYSIS     | e      |            |                      | procedure are in the |
|                |        |            |                      |  results section.    |
+----------------+--------+------------+----------------------+----------------------+
 documented in this encounter
 
 Results
 External Lab: Urinalysis (2015)
 
+-------------+----------+-----------+------------+--------------+
| Component   | Value    | Ref Range | Performed  | Pathologist  |
|             |          |           | At         | Signature    |
+-------------+----------+-----------+------------+--------------+
| UA Blood,   | negative |           | EXTERNAL   |              |
| External    |          |           | LAB        |              |
+-------------+----------+-----------+------------+--------------+
| UA Glucose, | normal   |           | EXTERNAL   |              |
|  External   |          |           | LAB        |              |
+-------------+----------+-----------+------------+--------------+
| UA Ketones, | negative |           | EXTERNAL   |              |
|  External   |          |           | LAB        |              |
+-------------+----------+-----------+------------+--------------+
| UA Ph,      | 5        |           | EXTERNAL   |              |
| External    |          |           | LAB        |              |
+-------------+----------+-----------+------------+--------------+
| UA          | negative |           | EXTERNAL   |              |
| Proteins,   |          |           | LAB        |              |
| External    |          |           |            |              |
+-------------+----------+-----------+------------+--------------+
| UA RBC,     | 0        |           | EXTERNAL   |              |
| External    |          |           | LAB        |              |
+-------------+----------+-----------+------------+--------------+
| UA Specific | 1.006    |           | EXTERNAL   |              |
|  Gravity,   |          |           | LAB        |              |
| External    |          |           |            |              |
+-------------+----------+-----------+------------+--------------+
| UA          | negative |           | EXTERNAL   |              |
| Leukocyte   |          |           | LAB        |              |
| Esterase,   |          |           |            |              |
| External    |          |           |            |              |
 
+-------------+----------+-----------+------------+--------------+
 
 
 
+----------------+---------+--------------------+--------------+
| Performing     | Address | City/State/Zipcode | Phone Number |
| Organization   |         |                    |              |
+----------------+---------+--------------------+--------------+
|   EXTERNAL LAB |         |                    |              |
+----------------+---------+--------------------+--------------+
 documented in this encounter
 
 Visit Diagnoses
 Not on filedocumented in this encounter

## 2020-01-08 NOTE — XMS
Encounter Summary
  Created on: 2020
 
 Viviana Ricci
 External Reference #: [id number]
 : 47
 Sex: Female
 
 Demographics
 
 
+-----------------------+----------------------+
| Address               | [address]      |
|                       | [place]  [address] |
+-----------------------+----------------------+
| Home Phone            | [phone]      |
+-----------------------+----------------------+
| Preferred Language    | Unknown              |
+-----------------------+----------------------+
| Marital Status        | Single               |
+-----------------------+----------------------+
| Buddhist Affiliation | 1009                 |
+-----------------------+----------------------+
| Race                  | Unknown              |
+-----------------------+----------------------+
| Ethnic Group          | Unknown              |
+-----------------------+----------------------+
 
 
 Author
 
 
+--------------+--------------------------------------------+
| Author       | [facility]  |
|              | and [place]                                |
+--------------+--------------------------------------------+
| Organization | [facility]  |
|              | and [place]                                |
+--------------+--------------------------------------------+
| Address      | Unknown                                    |
+--------------+--------------------------------------------+
| Phone        | Unavailable                                |
+--------------+--------------------------------------------+
 
 
 
 Support
 
 
+----------------+--------------+---------------------+-----------------+
| Name           | Relationship | Address             | Phone           |
+----------------+--------------+---------------------+-----------------+
| Ada/Ed Radhames | ECON         | [address]              | [phone] |
|                |              | [place]  |                 |
|                |              |  [address]              |                 |
+----------------+--------------+---------------------+-----------------+
 
 
 
 
 Care Team Providers
 
 
+-----------------------+------+-------------+
| Care Team Member Name | Role | Phone       |
+-----------------------+------+-------------+
 PCP  | Unavailable |
+-----------------------+------+-------------+
 
 
 
 Reason for Visit
 
 
+--------+----------+
| Reason | Comments |
+--------+----------+
| Other  |          |
+--------+----------+
 
 
 
 Encounter Details
 
 
+--------+-----------+----------------------+----------------------+-------------+
| Date   | Type      | Department           | Care Team            | Description |
+--------+-----------+----------------------+----------------------+-------------+
| / | Telephone |   [facility] [place]          |   Maricruz Flanagan, | Other       |
|    |           | PULMONARY  [address]     |  RN                  |             |
|        |           | [place]  [place], |                      |             |
|        |           |  [place] [address]       |                      |             |
|        |           | [phone]         |                      |             |
+--------+-----------+----------------------+----------------------+-------------+
 
 
 
 Social History
 
 
+--------------+-------+-----------+--------+------+
| Tobacco Use  | Types | Packs/Day | Years  | Date |
|              |       |           | Used   |      |
+--------------+-------+-----------+--------+------+
| Never Smoker |       |           |        |      |
+--------------+-------+-----------+--------+------+
 
 
 
+---------------------+---+---+---+
| Smokeless Tobacco:  |   |   |   |
| Never Used          |   |   |   |
+---------------------+---+---+---+
 
 
 
+---------------------------------------------------------------+
| Comments: some second hand smoke exposure, but fairly minimal |
+---------------------------------------------------------------+
 
 
 
 
+-------------+-------------+---------+----------+
| Alcohol Use | Drinks/Week | oz/Week | Comments |
+-------------+-------------+---------+----------+
| No          |             |         |          |
+-------------+-------------+---------+----------+
 
 
 
+------------------+---------------+
| Sex Assigned at  | Date Recorded |
| Birth            |               |
+------------------+---------------+
| Not on file      |               |
+------------------+---------------+
 
 
 
+----------------+-------------+-------------+
| Job Start Date | Occupation  | Industry    |
+----------------+-------------+-------------+
| Not on file    | Not on file | Not on file |
+----------------+-------------+-------------+
 
 
 
+----------------+--------------+------------+
| Travel History | Travel Start | Travel End |
+----------------+--------------+------------+
 
 
 
+-------------------------------------+
| No recent travel history available. |
+-------------------------------------+
 documented as of this encounter
 
 Plan of Treatment
 
 
+--------+-----------+------------+----------------------+-------------------+
| Date   | Type      | Specialty  | Care Team            | Description       |
+--------+-----------+------------+----------------------+-------------------+
| / | Office    | Cardiology |   Tonia Wilson,    |                   |
|    | Visit     |            | ARNP  [address]   |                   |
|        |           |            | St  [facility], [place]  |                   |
|        |           |            | [address]  [phone]  |                   |
|        |           |            |  [phone] (Fax)  |                   |
+--------+-----------+------------+----------------------+-------------------+
| / | Hospital  | Radiology  |   Popeye Townsend, |                   |
|    | Encounter |            |  MD  [address] |                   |
|        |           |            |  St.  [place],   |                   |
|        |           |            | [place] [address]             |                   |
|        |           |            | [phone]         |                   |
|        |           |            | [phone] (Fax)   |                   |
+--------+-----------+------------+----------------------+-------------------+
| / | Surgery   | Radiology  |   Popeye Townsend, | CV EP PPM SYSTEM  |
|    |           |            |  MD  [address] | IMPLANT           |
 
|        |           |            |  St.  [place],   |                   |
|        |           |            | [place] [address]             |                   |
|        |           |            | [phone]         |                   |
|        |           |            | [phone] (Fax)   |                   |
+--------+-----------+------------+----------------------+-------------------+
| 02/10/ | Clinical  | Cardiology |                      |                   |
|    | Support   |            |                      |                   |
+--------+-----------+------------+----------------------+-------------------+
| / | Office    | Cardiology |   Hellberg, Tonia,    |                   |
|    | Visit     |            | [facility]  [address]   |                   |
|        |           |            | [place]  |                   |
|        |           |            | [address]  [phone]  |                   |
|        |           |            |  [phone] (Fax)  |                   |
+--------+-----------+------------+----------------------+-------------------+
| / | Off-Site  | Nephrology |   Marzena Moser  |                   |
|    | Visit     |            | DO ETIENNE  [address] [place] [address]      |                   |
|        |           |            | [address]      |                   |
|        |           |            | [place]      |                   |
|        |           |            | [address] [phone]  |                   |
|        |           |            |  [phone] (Fax)  |                   |
+--------+-----------+------------+----------------------+-------------------+
 documented as of this encounter
 
 Visit Diagnoses
 Not on filedocumented in this encounter

## 2020-01-08 NOTE — XMS
Encounter Summary
  Created on: 2020
 
 Viviana Ricci
 External Reference #: [id number]
 : 47
 Sex: Female
 
 Demographics
 
 
+-----------------------+----------------------+
| Address               | [address]      |
|                       | [place]  [address] |
+-----------------------+----------------------+
| Home Phone            | [phone]      |
+-----------------------+----------------------+
| Preferred Language    | Unknown              |
+-----------------------+----------------------+
| Marital Status        | Single               |
+-----------------------+----------------------+
| Zoroastrian Affiliation | [id number]                 |
+-----------------------+----------------------+
| Race                  | Unknown              |
+-----------------------+----------------------+
| Ethnic Group          | Unknown              |
+-----------------------+----------------------+
 
 
 Author
 
 
+--------------+--------------------------------------------+
| Author       | [facility]  |
|              | and [place]                                |
+--------------+--------------------------------------------+
| Organization | [facility]  |
|              | and [place]                                |
+--------------+--------------------------------------------+
| Address      | Unknown                                    |
+--------------+--------------------------------------------+
| Phone        | Unavailable                                |
+--------------+--------------------------------------------+
 
 
 
 Support
 
 
+----------------+--------------+---------------------+-----------------+
| Name           | Relationship | Address             | Phone           |
+----------------+--------------+---------------------+-----------------+
| Ada/Ed Radhames | ECON         | [address]              | [phone] |
|                |              | [place]  |                 |
|                |              |  [address]              |                 |
+----------------+--------------+---------------------+-----------------+
 
 
 
 
 Care Team Providers
 
 
+-----------------------+------+-------------+
| Care Team Member Name | Role | Phone       |
+-----------------------+------+-------------+
 PCP  | Unavailable |
+-----------------------+------+-------------+
 
 
 
 Reason for Referral
 Diagnostic/Screening (Routine)
 
+--------+--------+-----------+--------------+--------------+---------------+
| Status | Reason | Specialty | Diagnoses /  | Referred By  | Referred To   |
|        |        |           | Procedures   | Contact      | Contact       |
+--------+--------+-----------+--------------+--------------+---------------+
| Closed |        | Radiology |   Diagnoses  |   [place], |   [facility] |
|        |        |           |  Other chest |  MD Popeye  |  Medicine     |
|        |        |           |  pain  CAD   |  [address]    | [address]  |
|        |        |           | (coronary    | [address]   |  [place], |
|        |        |           | artery       | [place], |  [place]           |
|        |        |           | disease)     |  [place] [address]    | [address]    |
|        |        |           | Pre-op       | Phone:       | Phone:        |
|        |        |           | evaluation   | [phone] | [phone]  |
|        |        |           | Procedures   |   Fax:       |  Fax:         |
|        |        |           | NM Nuclear   | [phone] | [phone]  |
|        |        |           | Stress Test  |              |               |
|        |        |           | (Vasodilator |              |               |
|        |        |           | )            |              |               |
+--------+--------+-----------+--------------+--------------+---------------+
 
 
 
 
 Reason for Visit
 
 
+----------------+-------------------------+
| Reason         | Comments                |
+----------------+-------------------------+
| Establish Care | Pulmonary Hypertension  |
+----------------+-------------------------+
 
 
 
 Encounter Details
 
 
+--------+---------+----------------------+----------------------+--------------------+
| Date   | Type    | Department           | Care Team            | Description        |
+--------+---------+----------------------+----------------------+--------------------+
| / | Office  |   [facility]          |   Popeye Townsend, | Other chest pain   |
|    | Visit   | CARDIOLOGY  [address]    |  MD  [address] | (Primary Dx); CAD  |
|        |         | [address]  [place], |  St.  [place],   | (coronary artery   |
|        |         |  [place] [address]       | [place] [address]             | disease); Pre-op   |
|        |         | [phone]         | [phone]         | evaluation         |
|        |         |                      | [phone] (Fax)   |                    |
 
+--------+---------+----------------------+----------------------+--------------------+
 
 
 
 Social History
 
 
+--------------+-------+-----------+--------+------+
| Tobacco Use  | Types | Packs/Day | Years  | Date |
|              |       |           | Used   |      |
+--------------+-------+-----------+--------+------+
| Never Smoker |       |           |        |      |
+--------------+-------+-----------+--------+------+
 
 
 
+---------------------+---+---+---+
| Smokeless Tobacco:  |   |   |   |
| Never Used          |   |   |   |
+---------------------+---+---+---+
 
 
 
+---------------------------------------------------------------+
| Comments: some second hand smoke exposure, but fairly minimal |
+---------------------------------------------------------------+
 
 
 
+-------------+-------------+---------+----------+
| Alcohol Use | Drinks/Week | oz/Week | Comments |
+-------------+-------------+---------+----------+
| No          |             |         |          |
+-------------+-------------+---------+----------+
 
 
 
+------------------+---------------+
| Sex Assigned at  | Date Recorded |
| Birth            |               |
+------------------+---------------+
| Not on file      |               |
+------------------+---------------+
 
 
 
+----------------+-------------+-------------+
| Job Start Date | Occupation  | Industry    |
+----------------+-------------+-------------+
| Not on file    | Not on file | Not on file |
+----------------+-------------+-------------+
 
 
 
+----------------+--------------+------------+
| Travel History | Travel Start | Travel End |
+----------------+--------------+------------+
 
 
 
 
+-------------------------------------+
| No recent travel history available. |
+-------------------------------------+
 documented as of this encounter
 
 Last Filed Vital Signs
 
 
+-------------------+-------------------+----------------------+----------+
| Vital Sign        | Reading           | Time Taken           | Comments |
+-------------------+-------------------+----------------------+----------+
| Blood Pressure    | 100/49            | 2014  1:57 PM  |          |
|                   |                   | PDT                  |          |
+-------------------+-------------------+----------------------+----------+
| Pulse             | 82                | 2014  1:57 PM  | regular  |
|                   |                   | PDT                  |          |
+-------------------+-------------------+----------------------+----------+
| Temperature       | -                 | -                    |          |
+-------------------+-------------------+----------------------+----------+
| Respiratory Rate  | 20                | 2014  1:57 PM  |          |
|                   |                   | PDT                  |          |
+-------------------+-------------------+----------------------+----------+
| Oxygen Saturation | -                 | -                    |          |
+-------------------+-------------------+----------------------+----------+
| Inhaled Oxygen    | -                 | -                    |          |
| Concentration     |                   |                      |          |
+-------------------+-------------------+----------------------+----------+
| Weight            | 125.6 kg (277 lb) | 2014  1:57 PM  |          |
|                   |                   | PDT                  |          |
+-------------------+-------------------+----------------------+----------+
| Height            | 167.6 cm (5' 6")  | 2014  1:57 PM  |          |
|                   |                   | PDT                  |          |
+-------------------+-------------------+----------------------+----------+
| Body Mass Index   | 44.71             | 2014  1:57 PM  |          |
|                   |                   | PDT                  |          |
+-------------------+-------------------+----------------------+----------+
 documented in this encounter
 
 Progress Notes
 Popeye Townsend MD - 2014  2:00 PM PDTFormatting of this note might be different f
rom the original.
   
 
 PATIENT NAME:  Viviana Ricci  
 : 1945:         AGE: 68 y.o.
 REFERRED BY: Popeye Townsend PRIMARY CARE:
 DO LEONIDAS Higginbotham PATIENT OFFICE VISIT
 
 Date of Service: 2014
 
 HISTORY OF PRESENT ILLNESS:
 Viviana Ricci is a 68 y.o. female with a history of CAD post CABG x 3 in , history of 
diabetes, renal failure post a renal transplantation, morbid obesity, inactivity, hypertensi
on, hyperlipidemia, pulmonary hypertension and severe arthritis.  She is being seen today fo
r follow up preop clearance prior to the knee surgery.
 
 She was last seen on 4/13/10 at which time the treatment for pulmonary hypertension was dis
 
cussed.  Since that time, patient has lost to followup.  Today, patient state that she is henry
ffering from a knee pain secondary to a "knock kneed".  She is being seen by the orthopedic 
surgeon for a possible knee surgery/correction.  Because of the severe knee and back pain, h
er physical activity is very limited.  She ambulates very little using a walker.  In the pas
t 3 weeks, she has used a stationary bicycle up to 5 minutes without chest pain but admits h
aving some trouble breathing.  There is no palpitation, dizziness, lightheadedness and leg s
welling.
 
 CURRENT PROBLEMS
 Patient Active Problem List 
 Diagnosis 
   LOW BACK PAIN, CHRONIC 
   HYPOTHYROIDISM 
   HYPERLIPIDEMIA 
   COMPLICATIONS OF TRANSPLANTED KIDNEY 
   MOVEMENT DISORDER 
   DIABETES MELLITUS, TYPE II, UNCONTROLLED 
   Pulmonary hypertension 
   Coronary artery disease 
   Unspecified hypertensive kidney disease with chronic kidney disease stage I through sta
ge IV, or unspecified 
   JACK on CPAP 
   Obesity hypoventilation syndrome 
   Kidney replaced by transplant 
   Secondary hyperparathyroidism 
   Hypervolemia 
   Dyspnea 
   Cough 
   FSGS (focal segmental glomerulosclerosis) 
   Diabetes mellitus, type 2 
   Murmur 
   Cardiomegaly 
   HTN (hypertension) 
   CAD (coronary artery disease) 
 
 MEDICAL, SURGICAL, AND PERSONAL HISTORY
 Past Surgical History 
 Procedure Date 
   Cholecystectomy  
   Insert peritoneal catheter 1998 
   x 2 
   Kidney transplant 2000 
   Total knee arthroplasty  
   Right 
   Hysterectomy 2003 
   Abdominal exploration surgery 2006 
   Parathyroidectomy 2007 
   Carpal tunnel release  
   Coronary artery bypass graft  
   3 
  
 
 Family History 
 Problem Relation Age of Onset 
   Parkinsonism Father  
   Heart disease Father  
   Ovarian cancer Mother  
   Other (See Comment) Brother  
   paralyzed in accident and then  from complications 
 
 
 Family Status 
 Relation Status Death Age 
   Father  67 
   Parkinson's, CHF 
   Mother  83 
   Uterine cancer 
   Brother  58 
   Car accident 
   Brother Alive  
   Healthy 
   Brother  72 
   Parkinson's disease 
 
 History 
 
 Social History 
   Marital Status: Single 
   Spouse Name: N/A 
   Number of Children: 0 
   Years of Education: N/A 
 
 Occupational History 
   .  Disabled 
   Retired 
 
 Social History Main Topics 
   Smoking status: Never Smoker  
   Smokeless tobacco: Never Used 
    Comment: some second hand smoke exposure, but fairly minimal 
   Alcohol Use: No 
   Drug Use: No 
   Sexually Active: None 
 
 Other Topics Concern 
   None 
 
 Social History Narrative 
  Exercise: NoneCaffeine: 1 soda dailyLiving situation: aloneHas a dog at home. No other ani
mal exposures. Had birds as a child. Grew up in [place], [place]. 
 
 CURRENT MEDICATIONS
 Outpatient Encounter Prescriptions as of 2014 
 Medication Sig Dispense Refill 
   allopurinol (ZYLOPRIM) 100 mg tablet Take 1 tablet by mouth Daily.  30 tablet  11 
   aspirin 81 MG EC tablet Take 81 mg by mouth Daily.     
   cholecalciferol (VITAMIN D-3) 2000 UNITS TABS Take 1,000 Units by mouth Three times a w
[place].     
   cinacalcet (SENSIPAR) 30 mg tablet Take 1 tablet by mouth Daily.  30 tablet  12 
   fludrocortisone (FLORINEF) 0.1 mg tablet Take 1 tablet by mouth Every other day.  45 ta
blet  2 
   fluticasone (FLOVENT HFA) 220 mcg/puff inhaler Inhale 1 puff into the lungs 2 times shante
ly. Rinse mouth after use.  1 Inhaler  11 
   furosemide (LASIX) 40 mg tablet Take 1 tablet by mouth Daily.  30 tablet  5 
   glucose blood VI test strips (ONE TOUCH ULTRA TEST) strip Check blood sugar before each
 meal and as directed  100 each  12 
   insulin glargine (LANTUS) 100 units/mL injection Inject 20 Units under the skin every m
orning.  10 mL  prn 
   insulin lispro (HUMALOG) 100 units/mL injection Inject subcutaneously before meals acco
rding to sliding scale  10 pen  5 
   Insulin Syringe-Needle U-100 (BD INSULIN SYRINGE ULTRAFINE) 31G X 5/16" 0.5 ML MISC Use
 
 before meals and as directed.  100 each  11 
   levothyroxine (LEVOTHROID) 50 mcg tablet Take 1 tablet by mouth Daily.  30 tablet  11 
   lisinopril (PRINIVIL,ZESTRIL) 30 MG tablet Take 1 tablet by mouth Daily.  90 tablet  3 
   loperamide (ANTI-DIARRHEAL) 2 mg capsule Take 2 mg by mouth Daily as needed.     
   magnesium oxide (MAG-OX) 400 mg tablet Take 1 tablet by mouth 2 times daily.  62 tablet
  12 
   metoprolol tartrate (LOPRESSOR) 25 mg tablet Take 1 tablet by mouth 2 times daily.  60 
tablet  11 
   mycophenolate (CELLCEPT) 250 mg capsule Take 250 mg by mouth 3 times daily.     
   omeprazole (PRILOSEC) 20 mg capsule Take one capsule by mouth once daily on an empty st
omach  90 capsule  3 
   predniSONE (DELTASONE) 5 mg tablet Take 1 tablet by mouth Daily.  90 tablet  3 
   Prenatal Multivit-Min-Fe-FA (PRENATAL VITAMINS) 0.8 MG TABS Take 0.8 mg by mouth Daily.
  30 each  11 
   Prenatal Vit-Fe Fumarate-FA (PNV PRENATAL PLUS MULTIVITAMIN) 27-1 MG TABS      
   Respiratory Therapy Supplies MISC Decrease CPAP to 12-18 cmH2O Diagnosis Code(s)327.23.
 Please send order to [facility].  1 each  0 
   rosuvastatin (CRESTOR) 20 mg tablet Take 1 tablet by mouth nightly.  30 tablet  11 
   tacrolimus (PROGRAF) 0.5 mg capsule TAD.     
   tacrolimus (PROGRAF) 1 mg capsule Take 1.5 mg by mouth 2 times daily.     
   valsartan (DIOVAN) 160 mg tablet Take 1 tablet by mouth Daily.  30 tablet  11 
  
 
 ALLERGIES
 No Known Allergies
 
 ROS
 Review of Systems 
 Constitutional: Positive for weight loss, malaise/fatigue and diaphoresis. Negative for fev
er and chills. 
 HENT: Positive for neck pain. Negative for hearing loss, nosebleeds, congestion and tinnitu
s.  
 Eyes: Negative for blurred vision and double vision. 
 Respiratory: Negative for cough, shortness of breath and wheezing.  
      Sleep Apnea 
 Cardiovascular: Positive for claudication and leg swelling. Negative for chest pain, palpit
ations and orthopnea. 
 Gastrointestinal: Negative for heartburn, nausea, vomiting, diarrhea, constipation, blood i
n stool and melena. 
 Genitourinary: Negative for urgency, frequency and hematuria. 
 Musculoskeletal: Positive for myalgias and back pain. Negative for joint pain and falls. 
 Skin: Negative for itching and rash. 
 Neurological: Positive for dizziness, tremors and weakness. Negative for tingling, seizures
, loss of consciousness and headaches. 
 Endo/Heme/Allergies: Negative for environmental allergies. Bruises/bleeds easily. 
 Psychiatric/Behavioral: Negative for memory loss. The patient is not nervous/anxious and do
es not have insomnia.  
 
 OBJECTIVE:  
 
 PHYSICAL EXAM
 /49 | Pulse 82 | Resp 20 | Ht 1.676 m (5' 6") | Wt 125.646 kg (277 lb) | BMI 44.73 kg
/m2
 Physical Exam 
 Constitutional: She appears well-developed and well-nourished. No distress. 
      Obese, female individual without acute distress. 
 Neck: Normal carotid pulses, no hepatojugular reflux and no JVD present. Carotid bruit is n
ot present. 
 Cardiovascular: Normal rate, regular rhythm, S1 normal, S2 normal, normal heart sounds, int
act distal pulses and normal pulses.  PMI is not displaced.  Exam reveals no gallop, no S3, 
 
no S4 and no friction rub.  
 No murmur heard.
 Pulses:
      Carotid pulses are 2+ on the right side, and 2+ on the left side.
      Dorsalis pedis pulses are 2+ on the right side, and 2+ on the left side. 
 Pulmonary/Chest: Effort normal and breath sounds normal. No accessory muscle usage. No resp
iratory distress. She has no wheezes. She has no rhonchi. She has no rales. 
 Abdominal: Normal appearance, normal aorta and bowel sounds are normal. She exhibits no abd
ominal bruit. There is no hepatosplenomegaly. There is no tenderness. 
 Musculoskeletal: She exhibits edema (trace bilateral ankle pitting edema.). 
 Neurological: She is alert. Gait normal. 
 Skin: Skin is warm and dry. 
 Psychiatric: She has a normal mood and affect. Her mood appears not anxious. She does not e
xhibit a depressed mood. 
 
 ECG:  Sinus rhythm, first degree AV block, poor RV progression in the anterior wall myocard
ial ischemia.
 
 LAB RESULTS: 
 LAB RESULTS: 
 LIPID
 Lab Results 
 Component Value Date 
  CHOL 162 2013 
  TRIG 225 2013 
  HDL 45 2013 
  LDL 72 2013 
  LDLEX 65 3/12/2014 
  HDLEX 50.2 3/12/2014 
  TRIGEX 197 3/12/2014 
  CHOLEX 155 3/12/2014 
 
 CHEMISTRY
 Lab Results 
 Component Value Date 
   3/12/2014 
   3/12/2014 
  K 5.0 3/12/2014 
   3/12/2014 
  CO2 21 3/12/2014 
  CALCIUM 10.2 3/12/2014 
  ALKPHOS 102 3/12/2014 
  AST 20 7/10/2013 
  ASTEX 23 3/12/2014 
  ALT 14 7/10/2013 
  ALTEX 16 3/12/2014 
  BILITOT 0.5 3/12/2014 
  CREA 1.7 7/10/2013 
  BUN 44 3/12/2014 
  EGFR 31.0 7/10/2013 
  EGFREX 37 3/12/2014 
  CREEX 1.43 3/12/2014 
 
 HEMATOLOGY
 Lab Results 
 Component Value Date 
  WBC 4.6 7/10/2013 
  HGB 11.9 7/10/2013 
  HCT 35.7 7/10/2013 
  * 7/10/2013 
 
  HGBEX 13.3 3/12/2014 
 
 I personally reviewed records from another healthcare provider.
 
 ASSESSMENT:  
 1. Coronary artery disease preop clearance prior to the knee surgery
 A. Status post CABG x 3 in .
             B. A persantine sestamibi stress test on 07 revealed a medium sized, mode
rate in severity fixed defect of the anterior wall, and a small sized mild in severity fixed
 defect of the inferior wall, LVEF by gated SPECT was 66%.
  C. BHC on 05/03/10, shows severe two vessel coronary artery disease, a chronic occlusion t
hrough the proximal portion of the left anterior descending artery and a chronic occlusion t
hrough the proximal portion of the left circumflex artery, grafts: open left internal mammar
y artery graft to the mid left anterior descending artery, open saphenous vein grafts to the
 OM1 and OM2, mildly dilated left ventricular cavity with a normal left systolic function, L
VEF 70%, mild to moderate pulmonary hypertension and a normal cardiac output, coronary circu
lation is right dominant, normal system pressure.
  D. Because of the severe knee and back pain, her physical activity is very limited.  She a
mbulates very little using a walker. In the past 3 weeks, she has used a stationary bicycle 
up to 5 minutes without chest pain but admits having some trouble breathing.    There is no 
signs and symptoms of overt congestive heart failure.  She is in a class II of [place] [employer] functional class.  There is no fluid retention on physical examination.
  EKG shows a poor RV progression cannot rule out anterior wall myocardial ischemia. Patient
 meets a Persantine stress test to risk stratify her CAD prior to the knee surgery.
 2. Severe arthritis
  A. Patient state that she is suffering from a knee pain secondary to a "knock kneed".  She
 is being seen by the orthopedic surgeon for a possible knee surgery/correction.   
 3. Right sided heart failure/ cor pulmonale / severe pulmonary hypertension:
             A. The echocardiogram from 02/19/10 revealed moderate bi-atrial dilatation and 
normal left ventricular size, wall thickness and motion, LVEF is 55-60%, dilated right ventr
icle with moderate hypo-kinesis, moderate tricuspid valve regurgitation, severe pulmonary hy
pertension with a peak systolic pressure of 80 mmHg, and a small pericardial effusion. 
  B. Echocardiogram from 4/15/14 showed Mild left atrial dilatation. Normal left ventricular
 size, wall thickness and motion. Preserved  left ventricular systolic function. LVEF is 70-
75%. Grade 1 left ventricular diastole dysfunction. Mild tricuspid valve regurgitation. Mild
 thickened trileaflet aortic valve with adequate opening. A mild aortic valve insufficiency.
 Normal right-sided pressure.
  C. Apparently, recent echo shows a normal right-sided pressure.
 4. Morbid obesity.
 5. Suspecting obstructive sleep apnea:
 6. History of hypertension:
  A. Good blood pressure.
 7. History of diabetes.
 8. History of inactivity.
 9. History of chronic renal insufficiency:
 
 PLAN:  
 1. Persantine SPECT MPI is indicated to risk stratify her CAD prior to any surgery.
 2. I recommend crosstraining i.e.pool therapy.
 3. followup in 2-4 weeks.
 
 Portions of this report were transcribed using voice recognition software.  Every effort wa
s made to ensure accuracy; however, inadvertent computerized transcription errors may be pre
sent.
 Electronically signed by: Popeye Townsend MD [facility] 2014 14:00  Electronically signed 
by Popeye Townsend MD at 2014  4:31 PM PDTdocumented in this encounter
 
 Plan of Treatment
 
 
 
+--------+-----------+------------+----------------------+-------------------+
| Date   | Type      | Specialty  | Care Team            | Description       |
+--------+-----------+------------+----------------------+-------------------+
| / | Office    | Cardiology |   Tonia Wilson,    |                   |
|    | Visit     |            | ARNP  [address]   |                   |
|        |           |            |   [place]  |                   |
|        |           |            | [address]  [phone]  |                   |
|        |           |            |  [phone] (Fax)  |                   |
+--------+-----------+------------+----------------------+-------------------+
| / | Hospital  | Radiology  |   Popeye Townsend, |                   |
|    | Encounter |            |  MD  [address] |                   |
|        |           |            |  St.  [place],   |                   |
|        |           |            | [place] [address]             |                   |
|        |           |            | [phone]         |                   |
|        |           |            | [phone] (Fax)   |                   |
+--------+-----------+------------+----------------------+-------------------+
| / | Surgery   | Radiology  |   Popeye Tonwsend, | CV EP PPM SYSTEM  |
|    |           |            |  MD  [address] | IMPLANT           |
|        |           |            |  St.  [place],   |                   |
|        |           |            | [place] [address]             |                   |
|        |           |            | [phone]         |                   |
|        |           |            | [phone] (Fax)   |                   |
+--------+-----------+------------+----------------------+-------------------+
| 02/10/ | Clinical  | Cardiology |                      |                   |
2020   | Support   |            |                      |                   |
+--------+-----------+------------+----------------------+-------------------+
| / | Office    | Cardiology |   Tonia Wilson,    |                   |
|    | Visit     |            | [facility]  [address]   |                   |
|        |           |            | St  [place]  |                   |
|        |           |            | [address]  [phone]  |                   |
|        |           |            |  [phone] (Fax)  |                   |
+--------+-----------+------------+----------------------+-------------------+
| / | Off-Site  | Nephrology |   Marzena Moser  |                   |
2020   | Visit     |            | DO [facility]  [address] [place] [address]      |                   |
|        |           |            | [address]      |                   |
|        |           |            | [place]      |                   |
|        |           |            | [address]  [phone]  |                   |
|        |           |            |  [phone] (Fax)  |                   |
+--------+-----------+------------+----------------------+-------------------+
 documented as of this encounter
 
 Results
 NM Nuclear Stress Test (Vasodilator) (2014 11:56 AM PDT)
 
+----------+
| Specimen |
+----------+
|          |
+----------+
 
 
 
+------------------------------------------------------------------------+-----------------+
| Impressions                                                            | Performed At    |
+------------------------------------------------------------------------+-----------------+
|      1.    Persantine EKG is negative.  2.   Normal   Persantine       |   [facility]    |
| Sestamibi myocardial perfusion study with a normal   left ventricular  | [facility]        |
| size and wall thickness.   Preserved left ventricular   systolic       | [facility]  |
| function.   LVEF by gated SPECT 78%.              Signed by: Popeye    | - IMAGING       |
| MD Cary [facility]     2014, 12:12                                |                 |
 
+------------------------------------------------------------------------+-----------------+
 
 
 
+------------------------------------------------------------------------+-----------------+
| Narrative                                                              | Performed At    |
+------------------------------------------------------------------------+-----------------+
|                     NUCLEAR MEDICINE STRESS TEST REPORT                |   [facility]    |
| Patient Name: Viviana Ricci   Study Date:   2014   Primary Care  | [facility]        |
| Provider: Marzena Moser DO   MRN:   [id number]   :           | [facility]  |
|   1945 Age:   68 y.o. Gender: female      CLINICAL               | - IMAGING       |
| HISTORY/DIAGNOSIS:   Chest pain      PERSANTINE SESTAMIBI STRESS TEST  |                 |
|  Indication:  Chest pain      Procedure:   In the supine position, 60  |                 |
| mg of   Persantine was infused intravenously   over 4 minutes.   Blood |                 |
|  pressure and EKG were monitored every 1 minute.   5   mL of normal    |                 |
| saline was utilized to flush the IV line.   2.5 minutes later,   10.2  |                 |
| mCi sestamibi intravenous injection.   SPECT myocardial perfusion      |                 |
| imaging was acquired with wall motion analysis.   Rest imaging was     |                 |
| performed using 31.8 mCi Sestamibi intravenous injection.   Repeated   |                 |
| SPECT   myocardial perfusion imaging was acquired with wall motion     |                 |
| analysis.   At   the end of the procedure, 75 mg of aminophylline was  |                 |
| infused   intravenously.     Hemodynamics:     Heart rate baseline 85  |                 |
| beats per minute, peak 90 beats per minute.   Blood   pressure         |                 |
| baseline 108/70 mmHg, peak 100/72mmHg.   EKG baseline underlying       |                 |
| sinus rhythm.   Normal EKG.   Peak unchanged.     Side Effects:        |                 |
|   None.     Arrhythmia:   None.     Persantine sestamibi Myocardial    |                 |
| Perfusion Imaging Result:          The Persantine Sestamibi            |                 |
| tomographic images, reviewed without the   attenuation compensation    |                 |
| resolution, revealed a normal myocardial   perfusion pattern as seen   |                 |
| in short axis, vertical long axis, and   horizontal long axis          |                 |
| projections. The left ventricular cavity is normal.   The rest imaging |                 |
|  is also normal.               Gated SPECT reveals a normal left       |                 |
| ventricular wall thickness and motion.     Preserved left ventricular  |                 |
| systolic function. LVEF by gated SPECT is 78%.                         |                 |
+------------------------------------------------------------------------+-----------------+
 
 
 
+-------------------------------------------------------------------------------------------
--------------------------------------------------------------------------------------------
----------------+
| Procedure Note                                                                            
                                                                                            
                |
+-------------------------------------------------------------------------------------------
--------------------------------------------------------------------------------------------
----------------+
|   Popeye Townsend MD - 2014  1:03 PM PDT  Formatting of this note might be       
                                                                                            
                |
| different from the original.     NUCLEAR MEDICINE STRESS TEST REPORT Patient Name: Viviana   
                                                                                            
                |
| Deedee Ricci  Study Date:  2014 Primary Care Provider: Marzena Moser DO  MRN:     
                                                                                            
                |
| [id number] :  1945 Age:  68 y.o. Gender: female   CLINICAL HISTORY/DIAGNOSIS:   
                                                                                            
                |
|  Chest pain PERSANTINE SESTAMIBI STRESS TESTIndication:Chest pain Procedure: In the       
 
                                                                                            
                |
| supine position, 60 mg of  Persantine was infused intravenously over 4 minutes.  Blood    
                                                                                            
                |
| pressure and EKG were monitored every 1 minute.  5 mL of normal saline was utilized to    
                                                                                            
                |
| flush the IV line.  2.5 minutes later, 10.2 mCi sestamibi intravenous injection.  SPECT   
                                                                                            
                |
| myocardial perfusion imaging was acquired with wall motion analysis.  Rest imaging was    
                                                                                            
                |
| performed using 31.8 mCi Sestamibi intravenous injection.  Repeated SPECT myocardial      
                                                                                            
                |
| perfusion imaging was acquired with wall motion analysis.  At the end of the procedure,   
                                                                                            
                |
| 75 mg of aminophylline was infused intravenously.Hemodynamics:  Heart rate baseline 85    
                                                                                            
                |
| beats per minute, peak 90 beats per minute.  Blood pressure baseline 108/70 mmHg, peak    
                                                                                            
                |
| 100/72mmHg.  EKG baseline underlying sinus rhythm.  Normal EKG.  Peak unchanged.Side      
                                                                                            
                |
| Effects:  None.Arrhythmia:  None.Persantine sestamibi Myocardial Perfusion Imaging        
                                                                                            
                |
| Result:      The Persantine Sestamibi tomographic images, reviewed without the            
                                                                                            
                |
| attenuation compensation resolution, revealed a normal myocardial perfusion pattern as    
                                                                                            
                |
| seen in short axis, vertical long axis, and horizontal long axis projections. The left    
                                                                                            
                |
| ventricular cavity is normal. The rest imaging is also normal.        Gated SPECT         
                                                                                            
                |
| reveals a normal left ventricular wall thickness and motion.  Preserved left ventricular  
                                                                                            
                |
|  systolic function. LVEF by gated SPECT is 78%.   IMPRESSION: 1.   Persantine EKG is      
                                                                                            
                |
| negative.2.  Normal  Persantine Sestamibi myocardial perfusion study with a normal left   
                                                                                            
                |
| ventricular size and wall thickness.  Preserved left ventricular systolic function.       
                                                                                            
                |
| LVEF by gated SPECT 78%.Signed by: Popeye Townsend MD [facility]  2014, 12:12            
                                                                                            
                |
|                                                                                           
 
                                                                                            
                |
|Side Effects:  None.                                                                       
                                                                                            
                |
|                                                                                           
                                                                                            
                |
|Arrhythmia:  None.                                                                         
                                                                                            
                |
|                                                                                           
                                                                                            
                |
|Persantine sestamibi Myocardial Perfusion Imaging Result:                                  
                                                                                            
                |
| The Persantine Sestamibi tomographic images, reviewed without the attenuation compensation
 resolution, revealed a normal myocardial perfusion pattern as seen in short axis, vertical 
long axis, and  |
|horizontal long axis projections. The left ventricular cavity is normal. The rest imaging i
s also normal.                                                                              
                |
|                                                                                           
                                                                                            
                |
| Gated SPECT reveals a normal left ventricular wall thickness and motion.  Preserved left v
entricular systolic function. LVEF by gated SPECT is 78%.                                   
                |
|                                                                                           
                                                                                            
                |
|IMPRESSION:                                                                                
                                                                                            
                |
|                                                                                           
                                                                                            
                |
|1.   Persantine EKG is negative.                                                           
                                                                                            
                |
|2.  Normal  Persantine Sestamibi myocardial perfusion study with a normal left ventricular 
size and wall thickness.  Preserved left ventricular systolic function.  LVEF by gated SPECT
 78%.           |
|                                                                                           
                                                                                            
                |
|                                                                                           
                                                                                            
                |
|                                                                                           
                                                                                            
                |
|                                                                                           
                                                                                            
                |
|Signed by: Popeye Townsend MD [facility]                                                       
                                                                                            
                |
|  2014, 12:12                                                                         
 
                                                                                            
                |
+-------------------------------------------------------------------------------------------
--------------------------------------------------------------------------------------------
----------------+
 
 
 
+----------------------+---------------------+--------------------+----------------+
| Performing           | Address             | City/State/Zipcode | Phone Number   |
| Organization         |                     |                    |                |
+----------------------+---------------------+--------------------+----------------+
|   [facility]     |   [address] | [place]    |   [phone] |
| [facility]  |                     | [address]              |                |
| - IMAGING            |                     |                    |                |
+----------------------+---------------------+--------------------+----------------+
 documented in this encounter
 
 Visit Diagnoses
 
 
+------------------------------------------------------------------------------------------+
| Diagnosis                                                                                |
+------------------------------------------------------------------------------------------+
|   Other chest pain - Primary                                                             |
+------------------------------------------------------------------------------------------+
|   CAD (coronary artery disease)  Coronary atherosclerosis of unspecified type of vessel, |
|  native or graft                                                                         |
+------------------------------------------------------------------------------------------+
|   Pre-op evaluation  Preoperative examination, unspecified                               |
+------------------------------------------------------------------------------------------+
 documented in this encounter

## 2020-01-08 NOTE — XMS
Encounter Summary
  Created on: 2020
 
 Viviana Ricci
 External Reference #: [id number]
 : 47
 Sex: Female
 
 Demographics
 
 
+-----------------------+----------------------+
| Address               | [address]      |
|                       | [place]  [address] |
+-----------------------+----------------------+
| Home Phone            | [phone]      |
+-----------------------+----------------------+
| Preferred Language    | Unknown              |
+-----------------------+----------------------+
| Marital Status        | Single               |
+-----------------------+----------------------+
| [employer] Affiliation | [id number]                 |
+-----------------------+----------------------+
| Race                  | Unknown              |
+-----------------------+----------------------+
| Ethnic Group          | Unknown              |
+-----------------------+----------------------+
 
 
 Author
 
 
+--------------+--------------------------------------------+
| Author       | [facility]  |
|              | and [facility]                                |
+--------------+--------------------------------------------+
| Organization | [facility]  |
|              | and [facility]                                |
+--------------+--------------------------------------------+
| Address      | Unknown                                    |
+--------------+--------------------------------------------+
| Phone        | Unavailable                                |
+--------------+--------------------------------------------+
 
 
 
 Support
 
 
+----------------+--------------+---------------------+-----------------+
| Name           | Relationship | Address             | Phone           |
+----------------+--------------+---------------------+-----------------+
| Ada/Ed Radhames | ECON         | [place]              | [phone] |
|                |              | [place]  |                 |
|                |              |  [address]              |                 |
+----------------+--------------+---------------------+-----------------+
 
 
 
 
 Care Team Providers
 
 
+-----------------------+------+-------------+
| Care Team Member Name | Role | Phone       |
+-----------------------+------+-------------+
 PCP  | Unavailable |
+-----------------------+------+-------------+
 
 
 
 Reason for Visit
 
 
+-------------------+----------+
| Reason            | Comments |
+-------------------+----------+
| Medication Refill |          |
+-------------------+----------+
 
 
 
 Encounter Details
 
 
+--------+--------+---------------------+----------------------+-------------------+
| Date   | Type   | Department          | Care Team            | Description       |
+--------+--------+---------------------+----------------------+-------------------+
| / | Refill |   [facility]         |   Marzena Moser  | Medication Refill |
|    |        | NEPHROLOGY  [address]   | M, DO  [address]      |                   |
|        |        | [address]   | [address]      |                   |
|        |        | [place], [place]     | [place], [place]      |                   |
|        |        | [address]          | [address]  [phone]  |                   |
|        |        | [phone]        |  [phone] (Fax)  |                   |
+--------+--------+---------------------+----------------------+-------------------+
 
 
 
 Social History
 
 
+--------------+-------+-----------+--------+------+
| Tobacco Use  | Types | Packs/Day | Years  | Date |
|              |       |           | Used   |      |
+--------------+-------+-----------+--------+------+
| Never Smoker |       |           |        |      |
+--------------+-------+-----------+--------+------+
 
 
 
+---------------------+---+---+---+
| Smokeless Tobacco:  |   |   |   |
| Never Used          |   |   |   |
+---------------------+---+---+---+
 
 
 
+---------------------------------------------------------------+
| Comments: some second hand smoke exposure, but fairly minimal |
 
+---------------------------------------------------------------+
 
 
 
+-------------+-------------+---------+----------+
| Alcohol Use | Drinks/Week | oz/Week | Comments |
+-------------+-------------+---------+----------+
| No          |             |         |          |
+-------------+-------------+---------+----------+
 
 
 
+------------------+---------------+
| Sex Assigned at  | Date Recorded |
| Birth            |               |
+------------------+---------------+
| Not on file      |               |
+------------------+---------------+
 
 
 
+----------------+-------------+-------------+
| Job Start Date | Occupation  | Industry    |
+----------------+-------------+-------------+
| Not on file    | Not on file | Not on file |
+----------------+-------------+-------------+
 
 
 
+----------------+--------------+------------+
| Travel History | Travel Start | Travel End |
+----------------+--------------+------------+
 
 
 
+-------------------------------------+
| No recent travel history available. |
+-------------------------------------+
 documented as of this encounter
 
 Plan of Treatment
 
 
+--------+-----------+------------+----------------------+-------------------+
| Date   | Type      | Specialty  | Care Team            | Description       |
+--------+-----------+------------+----------------------+-------------------+
| / | Office    | Cardiology |   HellalfredoTonia,    |                   |
|    | Visit     |            | ARNP  [address]   |                   |
|        |           |            | St  [place], [place]  |                   |
|        |           |            | [address]  [phone]  |                   |
|        |           |            |  [phone] (Fax)  |                   |
+--------+-----------+------------+----------------------+-------------------+
| / | Hospital  | Radiology  |   Popeye Townsend, |                   |
|    | Encounter |            |  MD  [address] |                   |
|        |           |            |  St.  [place],   |                   |
|        |           |            | [place] [address]             |                   |
|        |           |            | [phone]         |                   |
|        |           |            | [phone] (Fax)   |                   |
+--------+-----------+------------+----------------------+-------------------+
| / | Surgery   | Radiology  |   Popeye Townsend, | CV EP PPM SYSTEM  |
 
|    |           |            |  MD  [address] | IMPLANT           |
|        |           |            |  St.  [place],   |                   |
|        |           |            | [place] [address]             |                   |
|        |           |            | [phone]         |                   |
|        |           |            | [phone] (Fax)   |                   |
+--------+-----------+------------+----------------------+-------------------+
| 02/10/ | Clinical  | Cardiology |                      |                   |
|    | Support   |            |                      |                   |
+--------+-----------+------------+----------------------+-------------------+
| / | Office    | Cardiology |   Tonia Wilson,    |                   |
|    | Visit     |            | ARNP  [address]   |                   |
|        |           |            | [place]  |                   |
|        |           |            | [address]  [phone]  |                   |
|        |           |            |  [phone] (Fax)  |                   |
+--------+-----------+------------+----------------------+-------------------+
| / | Off-Site  | Nephrology |   Marzena Moser  |                   |
|    | Visit     |            | DO ETIENNE  [address] [place] [address]      |                   |
|        |           |            | [address]      |                   |
|        |           |            | [place]      |                   |
|        |           |            | [address]  [phone]  |                   |
|        |           |            |  [phone] (Fax)  |                   |
+--------+-----------+------------+----------------------+-------------------+
 documented as of this encounter
 
 Visit Diagnoses
 Not on filedocumented in this encounter

## 2020-01-08 NOTE — XMS
Encounter Summary
  Created on: 2020
 
 Viviana Ricci
 External Reference #: [id number]
 : 47
 Sex: Female
 
 Demographics
 
 
+-----------------------+----------------------+
| Address               | [address]      |
|                       | [place]  [address] |
+-----------------------+----------------------+
| Home Phone            | [phone]      |
+-----------------------+----------------------+
| Preferred Language    | Unknown              |
+-----------------------+----------------------+
| Marital Status        | Single               |
+-----------------------+----------------------+
| Zoroastrian Affiliation | 1009                 |
+-----------------------+----------------------+
| Race                  | Unknown              |
+-----------------------+----------------------+
| Ethnic Group          | Unknown              |
+-----------------------+----------------------+
 
 
 Author
 
 
+--------------+--------------------------------------------+
| Author       | [facility]  |
|              | and [facility]                                |
+--------------+--------------------------------------------+
| Organization | [facility]  |
|              | and [facility]                                |
+--------------+--------------------------------------------+
| Address      | Unknown                                    |
+--------------+--------------------------------------------+
| Phone        | Unavailable                                |
+--------------+--------------------------------------------+
 
 
 
 Support
 
 
+----------------+--------------+---------------------+-----------------+
| Name           | Relationship | Address             | Phone           |
+----------------+--------------+---------------------+-----------------+
| Ada/Ed Radhames | ECON         | [place]              | [phone] |
|                |              | [place]  |                 |
|                |              |  [address]              |                 |
+----------------+--------------+---------------------+-----------------+
 
 
 
 
 Care Team Providers
 
 
+-----------------------+------+-------------+
| Care Team Member Name | Role | Phone       |
+-----------------------+------+-------------+
 PCP  | Unavailable |
+-----------------------+------+-------------+
 
 
 
 Reason for Visit
 
 
+-------------------+----------+
| Reason            | Comments |
+-------------------+----------+
| Medication Refill |          |
+-------------------+----------+
 
 
 
 Encounter Details
 
 
+--------+--------+---------------------+----------------------+-------------------+
| Date   | Type   | Department          | Care Team            | Description       |
+--------+--------+---------------------+----------------------+-------------------+
| / | Refill |   [facility]         |   Marzena Moser  | Medication Refill |
|    |        | NEPHROLOGY  [address]   | M, DO  [address]      |                   |
|        |        | [address]   | [address]      |                   |
|        |        | [place], [place]     | [place], [place]      |                   |
|        |        | [address]          | [address]  [phone]  |                   |
|        |        | [phone]        |  [phone] (Fax)  |                   |
+--------+--------+---------------------+----------------------+-------------------+
 
 
 
 Social History
 
 
+--------------+-------+-----------+--------+------+
| Tobacco Use  | Types | Packs/Day | Years  | Date |
|              |       |           | Used   |      |
+--------------+-------+-----------+--------+------+
| Never Smoker |       |           |        |      |
+--------------+-------+-----------+--------+------+
 
 
 
+---------------------+---+---+---+
| Smokeless Tobacco:  |   |   |   |
| Never Used          |   |   |   |
+---------------------+---+---+---+
 
 
 
+---------------------------------------------------------------+
| Comments: some second hand smoke exposure, but fairly minimal |
 
+---------------------------------------------------------------+
 
 
 
+-------------+-------------+---------+----------+
| Alcohol Use | Drinks/Week | oz/Week | Comments |
+-------------+-------------+---------+----------+
| No          |             |         |          |
+-------------+-------------+---------+----------+
 
 
 
+------------------+---------------+
| Sex Assigned at  | Date Recorded |
| Birth            |               |
+------------------+---------------+
| Not on file      |               |
+------------------+---------------+
 
 
 
+----------------+-------------+-------------+
| Job Start Date | Occupation  | Industry    |
+----------------+-------------+-------------+
| Not on file    | Not on file | Not on file |
+----------------+-------------+-------------+
 
 
 
+----------------+--------------+------------+
| Travel History | Travel Start | Travel End |
+----------------+--------------+------------+
 
 
 
+-------------------------------------+
| No recent travel history available. |
+-------------------------------------+
 documented as of this encounter
 
 Plan of Treatment
 
 
+--------+-----------+------------+----------------------+-------------------+
| Date   | Type      | Specialty  | Care Team            | Description       |
+--------+-----------+------------+----------------------+-------------------+
| / | Office    | Cardiology |   HellalfredoTonia,    |                   |
|    | Visit     |            | ARNP  [address]   |                   |
|        |           |            | St  [place], [place]  |                   |
|        |           |            | [address]  [phone]  |                   |
|        |           |            |  [phone] (Fax)  |                   |
+--------+-----------+------------+----------------------+-------------------+
| / | Hospital  | Radiology  |   Popeye Townsend, |                   |
|    | Encounter |            |  MD  [address] |                   |
|        |           |            |  St.  [place],   |                   |
|        |           |            | [place] [address]             |                   |
|        |           |            | [phone]         |                   |
|        |           |            | [phone] (Fax)   |                   |
+--------+-----------+------------+----------------------+-------------------+
| / | Surgery   | Radiology  |   Popeye Townsend, | CV EP PPM SYSTEM  |
 
|    |           |            |  MD  [address] | IMPLANT           |
|        |           |            |  St.  [place],   |                   |
|        |           |            | [place] [address]             |                   |
|        |           |            | [phone]         |                   |
|        |           |            | [phone] (Fax)   |                   |
+--------+-----------+------------+----------------------+-------------------+
| 02/10/ | Clinical  | Cardiology |                      |                   |
|    | Support   |            |                      |                   |
+--------+-----------+------------+----------------------+-------------------+
| / | Office    | Cardiology |   Tonia Wilson,    |                   |
|    | Visit     |            | ARNP  [address]   |                   |
|        |           |            | [place]  |                   |
|        |           |            | [address]  [phone]  |                   |
|        |           |            |  [phone] (Fax)  |                   |
+--------+-----------+------------+----------------------+-------------------+
| / | Off-Site  | Nephrology |   Marzena Moser  |                   |
|    | Visit     |            | DO ETIENNE  [address] [place] [address]      |                   |
|        |           |            | [address]      |                   |
|        |           |            | [place]      |                   |
|        |           |            | [address]  [phone]  |                   |
|        |           |            |  [phone] (Fax)  |                   |
+--------+-----------+------------+----------------------+-------------------+
 documented as of this encounter
 
 Visit Diagnoses
 Not on filedocumented in this encounter

## 2020-01-08 NOTE — XMS
Encounter Summary
  Created on: 2020
 
 Viviana Ricci
 External Reference #: [id number]
 : 47
 Sex: Female
 
 Demographics
 
 
+-----------------------+----------------------+
| Address               | [address]      |
|                       | [place]  [address] |
+-----------------------+----------------------+
| Home Phone            | [phone]      |
+-----------------------+----------------------+
| Preferred Language    | Unknown              |
+-----------------------+----------------------+
| Marital Status        | Single               |
+-----------------------+----------------------+
| Uats[employer] Affiliation | [id number]                 |
+-----------------------+----------------------+
| Race                  | Unknown              |
+-----------------------+----------------------+
| Ethnic Group          | Unknown              |
+-----------------------+----------------------+
 
 
 Author
 
 
+--------------+--------------------------------------------+
| Author       | [facility]  |
|              | and [place]                                |
+--------------+--------------------------------------------+
| Organization | [facility] [place]  |
|              | and [place]                                |
+--------------+--------------------------------------------+
| Address      | Unknown                                    |
+--------------+--------------------------------------------+
| Phone        | Unavailable                                |
+--------------+--------------------------------------------+
 
 
 
 Support
 
 
+----------------+--------------+---------------------+-----------------+
| Name           | Relationship | Address             | Phone           |
+----------------+--------------+---------------------+-----------------+
| Ada/Ed Radhames | ECON         | [place]              | [phone] |
|                |              | [place]  |                 |
|                |              |  [address]              |                 |
+----------------+--------------+---------------------+-----------------+
 
 
 
 
 Care Team Providers
 
 
+-----------------------+------+-------------+
| Care Team Member Name | Role | Phone       |
+-----------------------+------+-------------+
 PCP  | Unavailable |
+-----------------------+------+-------------+
 
 
 
 Reason for Visit
 
 
+----------------+----------+
| Reason         | Comments |
+----------------+----------+
| Urinary Tract  |          |
| Infection      |          |
+----------------+----------+
 
 
 
 Encounter Details
 
 
+--------+-----------+---------------------+----------------------+----------------+
| Date   | Type      | Department          | Care Team            | Description    |
+--------+-----------+---------------------+----------------------+----------------+
| / | Telephone |   [facility]         |   Marzena Moser  | Urinary Tract  |
|    |           | NEPHROLOGY  [address]   | M, DO  [address]      | Infection      |
|        |           | [address]   | [address]      |                |
|        |           | [place], [place]     | [place], [place]      |                |
|        |           | [address]          | [address]  [phone]  |                |
|        |           | [phone]        |  [phone] (Fax)  |                |
+--------+-----------+---------------------+----------------------+----------------+
 
 
 
 Social History
 
 
+--------------+-------+-----------+--------+------+
| Tobacco Use  | Types | Packs/Day | Years  | Date |
|              |       |           | Used   |      |
+--------------+-------+-----------+--------+------+
| Never Smoker |       |           |        |      |
+--------------+-------+-----------+--------+------+
 
 
 
+---------------------+---+---+---+
| Smokeless Tobacco:  |   |   |   |
| Never Used          |   |   |   |
+---------------------+---+---+---+
 
 
 
+---------------------------------------------------------------+
 
| Comments: some second hand smoke exposure, but fairly minimal |
+---------------------------------------------------------------+
 
 
 
+-------------+-------------+---------+----------+
| Alcohol Use | Drinks/Week | oz/Week | Comments |
+-------------+-------------+---------+----------+
| No          |             |         |          |
+-------------+-------------+---------+----------+
 
 
 
+------------------+---------------+
| Sex Assigned at  | Date Recorded |
| Birth            |               |
+------------------+---------------+
| Not on file      |               |
+------------------+---------------+
 
 
 
+----------------+-------------+-------------+
| Job Start Date | Occupation  | Industry    |
+----------------+-------------+-------------+
| Not on file    | Not on file | Not on file |
+----------------+-------------+-------------+
 
 
 
+----------------+--------------+------------+
| Travel History | Travel Start | Travel End |
+----------------+--------------+------------+
 
 
 
+-------------------------------------+
| No recent travel history available. |
+-------------------------------------+
 documented as of this encounter
 
 Plan of Treatment
 
 
+--------+-----------+------------+----------------------+-------------------+
| Date   | Type      | Specialty  | Care Team            | Description       |
+--------+-----------+------------+----------------------+-------------------+
| / | Office    | Cardiology |   Tonia Wilson,    |                   |
|    | Visit     |            | ARNJESSICA  [address]   |                   |
|        |           |            | St  [facility] [place]  |                   |
|        |           |            | [address]  [phone]  |                   |
|        |           |            |  [phone] (Fax)  |                   |
+--------+-----------+------------+----------------------+-------------------+
| / | Hospital  | Radiology  |   Popeye Townsend, |                   |
|    | Encounter |            |  MD  [address] |                   |
|        |           |            |  [place],   |                   |
|        |           |            | [place] [address]             |                   |
|        |           |            | [phone]         |                   |
|        |           |            | [phone] (Fax)   |                   |
+--------+-----------+------------+----------------------+-------------------+
 
| / | Surgery   | Radiology  |   Popeye Townsend, | CV EP PPM SYSTEM  |
|    |           |            |  MD  [address] | IMPLANT           |
|        |           |            |  St.  [place],   |                   |
|        |           |            | [place] [address]             |                   |
|        |           |            | [phone]         |                   |
|        |           |            | [phone] (Fax)   |                   |
+--------+-----------+------------+----------------------+-------------------+
| 02/10/ | Clinical  | Cardiology |                      |                   |
|    | Support   |            |                      |                   |
+--------+-----------+------------+----------------------+-------------------+
| / | Office    | Cardiology |   Tonia Wilson,    |                   |
|    | Visit     |            | BELKIS  [address]   |                   |
|        |           |            | [place]  |                   |
|        |           |            | [address]  [phone]  |                   |
|        |           |            |  [phone] (Fax)  |                   |
+--------+-----------+------------+----------------------+-------------------+
| / | Off-Site  | Nephrology |   Marzena Moser  |                   |
|    | Visit     |            | DO ETIENNE  [address] [place] [address]      |                   |
|        |           |            | [address]      |                   |
|        |           |            | [place]      |                   |
|        |           |            | [id number] [phone]  |                   |
|        |           |            |  [phone] (Fax)  |                   |
+--------+-----------+------------+----------------------+-------------------+
 documented as of this encounter
 
 Visit Diagnoses
 
 
+-------------------------------------------------------------------------------+
| Diagnosis                                                                     |
+-------------------------------------------------------------------------------+
|   Dysuria - Primary                                                           |
+-------------------------------------------------------------------------------+
|   Sensation of pressure in bladder area  Other specified disorders of bladder |
+-------------------------------------------------------------------------------+
 documented in this encounter

## 2020-01-08 NOTE — XMS
Encounter Summary
  Created on: 2020
 
 Viviana Ricci
 External Reference #: [id number]
 : 47
 Sex: Female
 
 Demographics
 
 
+-----------------------+----------------------+
| Address               | [address]      |
|                       | [place]  [address] |
+-----------------------+----------------------+
| Home Phone            | [phone]      |
+-----------------------+----------------------+
| Preferred Language    | Unknown              |
+-----------------------+----------------------+
| Marital Status        | Single               |
+-----------------------+----------------------+
| Rast[employer] Affiliation | [id number]                 |
+-----------------------+----------------------+
| Race                  | Unknown              |
+-----------------------+----------------------+
| Ethnic Group          | Unknown              |
+-----------------------+----------------------+
 
 
 Author
 
 
+--------------+--------------------------------------------+
| Author       | [facility] [place]  |
|              | and [place]                                |
+--------------+--------------------------------------------+
| Organization | [facility] [place]  |
|              | and [place]                                |
+--------------+--------------------------------------------+
| Address      | Unknown                                    |
+--------------+--------------------------------------------+
| Phone        | Unavailable                                |
+--------------+--------------------------------------------+
 
 
 
 Support
 
 
+----------------+--------------+---------------------+-----------------+
| Name           | Relationship | Address             | Phone           |
+----------------+--------------+---------------------+-----------------+
| Ada/Ed Radhames | ECON         | [address]              | [phone] |
|                |              | [place]  |                 |
|                |              |  [address]              |                 |
+----------------+--------------+---------------------+-----------------+
 
 
 
 
 Care Team Providers
 
 
+-----------------------+------+-------------+
| Care Team Member Name | Role | Phone       |
+-----------------------+------+-------------+
 PCP  | Unavailable |
+-----------------------+------+-------------+
 
 
 
 Reason for Visit
 
 
+-------------------+----------+
| Reason            | Comments |
+-------------------+----------+
| Medication Refill |          |
+-------------------+----------+
 
 
 
 Encounter Details
 
 
+--------+--------+---------------------+----------------------+-------------------+
| Date   | Type   | Department          | Care Team            | Description       |
+--------+--------+---------------------+----------------------+-------------------+
| / | Refill |   [place]         |   Marzena Moser  | Medication Refill |
|    |        | NEPHROLOGY  [address]   | M, DO  [address]      |                   |
|        |        | [address]   | [address]      |                   |
|        |        | [place], [place]     | [place], [place]      |                   |
|        |        | [address]          | [address]  [phone]  |                   |
|        |        | [phone]        |  [phone] (Fax)  |                   |
+--------+--------+---------------------+----------------------+-------------------+
 
 
 
 Social History
 
 
+--------------+-------+-----------+--------+------+
| Tobacco Use  | Types | Packs/Day | Years  | Date |
|              |       |           | Used   |      |
+--------------+-------+-----------+--------+------+
| Never Smoker |       |           |        |      |
+--------------+-------+-----------+--------+------+
 
 
 
+---------------------+---+---+---+
| Smokeless Tobacco:  |   |   |   |
| Never Used          |   |   |   |
+---------------------+---+---+---+
 
 
 
+---------------------------------------------------------------+
| Comments: some second hand smoke exposure, but fairly minimal |
 
+---------------------------------------------------------------+
 
 
 
+-------------+-------------+---------+----------+
| Alcohol Use | Drinks/Week | oz/Week | Comments |
+-------------+-------------+---------+----------+
| No          |             |         |          |
+-------------+-------------+---------+----------+
 
 
 
+------------------+---------------+
| Sex Assigned at  | Date Recorded |
| Birth            |               |
+------------------+---------------+
| Not on file      |               |
+------------------+---------------+
 
 
 
+----------------+-------------+-------------+
| Job Start Date | Occupation  | Industry    |
+----------------+-------------+-------------+
| Not on file    | Not on file | Not on file |
+----------------+-------------+-------------+
 
 
 
+----------------+--------------+------------+
| Travel History | Travel Start | Travel End |
+----------------+--------------+------------+
 
 
 
+-------------------------------------+
| No recent travel history available. |
+-------------------------------------+
 documented as of this encounter
 
 Plan of Treatment
 
 
+--------+-----------+------------+----------------------+-------------------+
| Date   | Type      | Specialty  | Care Team            | Description       |
+--------+-----------+------------+----------------------+-------------------+
| / | Office    | Cardiology |   HellalfredoTonia,    |                   |
|    | Visit     |            | ARNP  [address]   |                   |
|        |           |            | St  [place], [place]  |                   |
|        |           |            | [address]  [phone]  |                   |
|        |           |            |  [phone] (Fax)  |                   |
+--------+-----------+------------+----------------------+-------------------+
| / | Hospital  | Radiology  |   Popeye Townsend, |                   |
|    | Encounter |            |  MD  [address] |                   |
|        |           |            |  St.  [place],   |                   |
|        |           |            | [place] [address]             |                   |
|        |           |            | [phone]         |                   |
|        |           |            | [phone] (Fax)   |                   |
+--------+-----------+------------+----------------------+-------------------+
| / | Surgery   | Radiology  |   Popeye Townsend, | CV EP PPM SYSTEM  |
 
|    |           |            |  MD  [address] | IMPLANT           |
|        |           |            |  St.  [place],   |                   |
|        |           |            | [place] [address]             |                   |
|        |           |            | [phone]         |                   |
|        |           |            | [phone] (Fax)   |                   |
+--------+-----------+------------+----------------------+-------------------+
| 02/10/ | Clinical  | Cardiology |                      |                   |
|    | Support   |            |                      |                   |
+--------+-----------+------------+----------------------+-------------------+
| / | Office    | Cardiology |   Tonia Wilson,    |                   |
|    | Visit     |            | ARNP  [address]   |                   |
|        |           |            | [place]  |                   |
|        |           |            | [address]  [phone]  |                   |
|        |           |            |  [phone] (Fax)  |                   |
+--------+-----------+------------+----------------------+-------------------+
| / | Off-Site  | Nephrology |   Marzena Moser  |                   |
|    | Visit     |            | DO ETIENNE  [address] [place] [address]      |                   |
|        |           |            | [address]      |                   |
|        |           |            | [place]      |                   |
|        |           |            | [address]  [phone]  |                   |
|        |           |            |  [phone] (Fax)  |                   |
+--------+-----------+------------+----------------------+-------------------+
 documented as of this encounter
 
 Visit Diagnoses
 Not on filedocumented in this encounter

## 2020-01-08 NOTE — XMS
Encounter Summary
  Created on: 2020
 
 Viviana Hardy
 External Reference #: [id number]
 : 47
 Sex: Female
 
 Demographics
 
 
+-----------------------+----------------------+
| Address               | [address]      |
|                       | [place]  [address] |
+-----------------------+----------------------+
| Home Phone            | [phone]      |
+-----------------------+----------------------+
| Preferred Language    | Unknown              |
+-----------------------+----------------------+
| Marital Status        | Single               |
+-----------------------+----------------------+
| Hoahaoism Affiliation | [id number]                 |
+-----------------------+----------------------+
| Race                  | Unknown              |
+-----------------------+----------------------+
| Ethnic Group          | Unknown              |
+-----------------------+----------------------+
 
 
 Author
 
 
+--------------+--------------------------------------------+
| Author       | [facility]  |
|              | [facility]                                |
+--------------+--------------------------------------------+
| Organization | [facility]  |
|              | and [facility]                                |
+--------------+--------------------------------------------+
| Address      | Unknown                                    |
+--------------+--------------------------------------------+
| Phone        | Unavailable                                |
+--------------+--------------------------------------------+
 
 
 
 Support
 
 
+----------------+--------------+---------------------+-----------------+
| Name           | Relationship | Address             | Phone           |
+----------------+--------------+---------------------+-----------------+
| Ada/Ed Radhames | ECON         | [address]              | [phone] |
|                |              | [place]  |                 |
|                |              |  [address]              |                 |
+----------------+--------------+---------------------+-----------------+
 
 
 
 
 Care Team Providers
 
 
+-----------------------+------+-------------+
| Care Team Member Name | Role | Phone       |
+-----------------------+------+-------------+
 PCP  | Unavailable |
+-----------------------+------+-------------+
 
 
 
 Reason for Visit
 
 
+----------+-------------------------------------------------------------+
| Reason   | Comments                                                    |
+----------+-------------------------------------------------------------+
| Leg Pain | estab pt:new problem left leg pain onset x couple of months |
+----------+-------------------------------------------------------------+
 
 
 
 Encounter Details
 
 
+--------+---------+----------------------+----------------------+---------------------+
| Date   | Type    | Department           | Care Team            | Description         |
+--------+---------+----------------------+----------------------+---------------------+
| 01/15/ | Office  |   [facility]          |   Edgar Sanders,   | Knee pain (Primary  |
|    | Visit   | ORTHOPEDIC SURGERY   | MD  [address] [place] [address]     | Dx); Leg pain       |
|        |         | [address] [place] [address]     | [place]      |                     |
|        |         | [place]      | [address] [phone]  |                     |
|        |         | [address]           |  [phone] (Fax)  |                     |
|        |         | [phone]         |                      |                     |
+--------+---------+----------------------+----------------------+---------------------+
 
 
 
 Social History
 
 
+--------------+-------+-----------+--------+------+
| Tobacco Use  | Types | Packs/Day | Years  | Date |
|              |       |           | Used   |      |
+--------------+-------+-----------+--------+------+
| Never Smoker |       |           |        |      |
+--------------+-------+-----------+--------+------+
 
 
 
+---------------------+---+---+---+
| Smokeless Tobacco:  |   |   |   |
| Never Used          |   |   |   |
+---------------------+---+---+---+
 
 
 
+---------------------------------------------------------------+
| Comments: some second hand smoke exposure, but fairly minimal |
 
+---------------------------------------------------------------+
 
 
 
+-------------+-------------+---------+----------+
| Alcohol Use | Drinks/Week | oz/Week | Comments |
+-------------+-------------+---------+----------+
| No          |             |         |          |
+-------------+-------------+---------+----------+
 
 
 
+------------------+---------------+
| Sex Assigned at  | Date Recorded |
| Birth            |               |
+------------------+---------------+
| Not on file      |               |
+------------------+---------------+
 
 
 
+----------------+-------------+-------------+
| Job Start Date | Occupation  | Industry    |
+----------------+-------------+-------------+
| Not on file    | Not on file | Not on file |
+----------------+-------------+-------------+
 
 
 
+----------------+--------------+------------+
| Travel History | Travel Start | Travel End |
+----------------+--------------+------------+
 
 
 
+-------------------------------------+
| No recent travel history available. |
+-------------------------------------+
 documented as of this encounter
 
 Last Filed Vital Signs
 
 
+-------------------+-------------------+----------------------+----------+
| Vital Sign        | Reading           | Time Taken           | Comments |
+-------------------+-------------------+----------------------+----------+
| Blood Pressure    | -                 | -                    |          |
+-------------------+-------------------+----------------------+----------+
| Pulse             | -                 | -                    |          |
+-------------------+-------------------+----------------------+----------+
| Temperature       | -                 | -                    |          |
+-------------------+-------------------+----------------------+----------+
| Respiratory Rate  | -                 | -                    |          |
+-------------------+-------------------+----------------------+----------+
| Oxygen Saturation | -                 | -                    |          |
+-------------------+-------------------+----------------------+----------+
| Inhaled Oxygen    | -                 | -                    |          |
| Concentration     |                   |                      |          |
+-------------------+-------------------+----------------------+----------+
| Weight            | 129.3 kg (285 lb) | 01/15/2014  3:36 PM  |          |
 
|                   |                   | PST                  |          |
+-------------------+-------------------+----------------------+----------+
| Height            | 167.6 cm (5' 6")  | 01/15/2014  3:36 PM  |          |
|                   |                   | PST                  |          |
+-------------------+-------------------+----------------------+----------+
| Body Mass Index   | 46                | 01/15/2014  3:36 PM  |          |
|                   |                   | PST                  |          |
+-------------------+-------------------+----------------------+----------+
 documented in this encounter
 
 Progress Notes
 Edgar Sanders MD - 2014 11:08 AM PSTPt is well known to me - I did right TKA  
and she has done very well with that
 She has pain in her left leg that is somewhat different from what she experienced on the ri
ght
 She does have intermittent knee pain but also has pain that she localizes from lateral thig
h and radiates down the lateral leg
 She doesn't feel her symptoms are bad enough to want to go thru surgery if that was what wa
s necessary
 
 On exam today she has mild lateral joint line tenderness left knee
 Reasonable preservation of ROM left knee
 No varus/valgus stress instability
 Skin intact without rashes or lesions
 No joint effusion
 Distal pulses intact
 
 xrays show advanced DJD left knee especially lateral compartment
 Impression - left knee djd
 
 We discussed the natural history and treatment options
 She will let us know how she is doing over time
 15 min face time spent majority counselingElectronically signed by Edgar Sanders MD at  11:12 AM PSTdocumented in this encounter
 
 Plan of Treatment
 
 
+--------+-----------+------------+----------------------+-------------------+
| Date   | Type      | Specialty  | Care Team            | Description       |
+--------+-----------+------------+----------------------+-------------------+
| / | Office    | Cardiology |   Tonia Wilson,    |                   |
|    | Visit     |            | ARNP  [address]   |                   |
|        |           |            | St  [place]  |                   |
|        |           |            | [address] [phone]  |                   |
|        |           |            |  [phone] (Fax)  |                   |
+--------+-----------+------------+----------------------+-------------------+
| / | Hospital  | Radiology  |   AimeePopeye murillo, |                   |
|    | Encounter |            |  MD  [address] |                   |
|        |           |            |  St.  [place],   |                   |
|        |           |            | [place] [address]             |                   |
|        |           |            | [phone]         |                   |
|        |           |            | [phone] (Fax)   |                   |
+--------+-----------+------------+----------------------+-------------------+
| / | Surgery   | Radiology  |   Popeye Townsend, | CV EP PPM SYSTEM  |
|    |           |            |  MD  [address] | IMPLANT           |
|        |           |            |  St.  [place],   |                   |
|        |           |            | [place] [address]             |                   |
|        |           |            | [phone]         |                   |
|        |           |            | [phone] (Fax)   |                   |
 
+--------+-----------+------------+----------------------+-------------------+
| 02/10/ | Clinical  | Cardiology |                      |                   |
|    | Support   |            |                      |                   |
+--------+-----------+------------+----------------------+-------------------+
| / | Office    | Cardiology |   Tonia Wilson,    |                   |
|    | Visit     |            | ARNP  [address]   |                   |
|        |           |            | St  [place], [place]  |                   |
|        |           |            | [address]  [phone]  |                   |
|        |           |            |  [phone] (Fax)  |                   |
+--------+-----------+------------+----------------------+-------------------+
| / | Off-Site  | Nephrology |   Marzena Moser  |                   |
|    | Visit     |            | DO [place]  [address]      |                   |
|        |           |            | [address]      |                   |
|        |           |            | [place]      |                   |
|        |           |            | [address]  [phone]  |                   |
|        |           |            |  [phone] (Fax)  |                   |
+--------+-----------+------------+----------------------+-------------------+
 documented as of this encounter
 
 Results
 XR Knee Left 3 Vw (01/15/2014  5:14 PM PST)
 
+----------+
| Specimen |
+----------+
|          |
+----------+
 
 
 
+------------------------------------------------------------------------+-----------------+
| Narrative                                                              | Performed At    |
+------------------------------------------------------------------------+-----------------+
|    [facility]      Diagnostic Imaging          |   [place]    |
| Department      [address]      [phone]    | [facility]        |
|                             [   rep ct [address]]     [   rep ct [facility]  |
| st zip]     **** Signed ****                                           | - IMAGING       |
| ______________________________________________________________________ |                 |
| ______________________     Patient Name: VIVIANA HARDY   Physician:     |                 |
| .01     : 1945    Age: 68   Sex: F     Unit #: [id number]    |                 |
|   Exam Date: 01/15/14     Acct #: [id number]   Location: [facility]     |                 |
|     Report #: [id number]                      Page:                     |                 |
|   %(RAD)RES..mtdd.print.filter("pg") of   %(RAD)                       |                 |
| RES..mtdd.print.filter("tpg")                                          |                 |
| ______________________________________________________________________ |                 |
| ______________________     Accession Number: [id number]                |                 |
| LEFT KNEE X-RAY              CLINICAL HISTORY:   LEFT KNEE PAIN, LEG   |                 |
| PAIN.               COMPARISON: None.               FINDINGS:   Three  |                 |
| views of the left knee were obtained.               There are no acute |                 |
|  osseous abnormalities. Diffuse osteopenia is present. There is a bone |                 |
|  island in   the proximal tibia. Moderate to severe lateral            |                 |
| compartment joint space loss is present. There is   chondrocalcinosis  |                 |
| of the medial and lateral menisci. The lateral meniscus appears to be  |                 |
| extruded. Mild   osteophytosis is present of the lateral compartment.  |                 |
| A moderate knee joint effusion is seen. Mild   atherosclerosis is      |                 |
| present.               Limited evaluation of the right knee            |                 |
| demonstrates hardware for arthroplasty as well as ORIF hardware   in   |                 |
| the femoral shaft.               IMPRESSION:       1. DEGENERATIVE     |                 |
| CHANGES OF THE LEFT KNEE AS DESCRIBED ABOVE INCLUDING MODERATE TO      |                 |
| SEVERE LATERAL   COMPARTMENT JOINT SPACE LOSS.               2.        |                 |
 
| MODERATE KNEE JOINT EFFUSION.               Dictated Date/Time:        |                 |
|   01/15/2014 17:14      Transcribed Date/Time:   01/15/2014 18:00      |                 |
|  :                         <<Signature on File>>  |                 |
|     -----------------------------------------------------------        |                 |
| Derek Reed MD01/15/14 2218     <Electronically signed by Derek Reed   |                 |
| MD>               Derek Reed MD 01/15/14 1714     :   |                 |
| [employer] Masvmzcglcpdt55/15/14 1800               Edgar Sanders MD    |                 |
|                                                                        |                 |
+------------------------------------------------------------------------+-----------------+
 
 
 
+----------------------+---------------------+--------------------+----------------+
| Performing           | Address             | City/State/[facility] [[email or web address]]code | Phone Number   |
| Organization         |                     |                    |                |
+----------------------+---------------------+--------------------+----------------+
|   [facility]     |   [address] | [place]    |   [phone] |
| [facility]  |                     | [address]              |                |
| - IMAGING            |                     |                    |                |
+----------------------+---------------------+--------------------+----------------+
 documented in this encounter
 
 Visit Diagnoses
 
 
+-------------------------------------------------+
| Diagnosis                                       |
+-------------------------------------------------+
|   Knee pain - Primary  Pain in joint, lower leg |
+-------------------------------------------------+
|   Leg pain  Pain in limb                        |
+-------------------------------------------------+
 documented in this encounter

## 2020-01-08 NOTE — XMS
Encounter Summary
  Created on: 2020
 
 Viviana Ricci
 External Reference #: [id number]
 : 47
 Sex: Female
 
 Demographics
 
 
+-----------------------+----------------------+
| Address               | [address]      |
|                       | [place]  [address] |
+-----------------------+----------------------+
| Home Phone            | [phone]      |
+-----------------------+----------------------+
| Preferred Language    | Unknown              |
+-----------------------+----------------------+
| Marital Status        | Single               |
+-----------------------+----------------------+
| Druze Affiliation | [id number]                 |
+-----------------------+----------------------+
| Race                  | Unknown              |
+-----------------------+----------------------+
| Ethnic Group          | Unknown              |
+-----------------------+----------------------+
 
 
 Author
 
 
+--------------+--------------------------------------------+
| Author       | [facility]  |
|              | and [facility]                                |
+--------------+--------------------------------------------+
| Organization | [facility]  |
|              | and [place]                                |
+--------------+--------------------------------------------+
| Address      | Unknown                                    |
+--------------+--------------------------------------------+
| Phone        | Unavailable                                |
+--------------+--------------------------------------------+
 
 
 
 Support
 
 
+----------------+--------------+---------------------+-----------------+
| Name           | Relationship | Address             | Phone           |
+----------------+--------------+---------------------+-----------------+
| Ada/Ed Radhames | ECON         | [address]              | [phone] |
|                |              | [place]  |                 |
|                |              |  [address]              |                 |
+----------------+--------------+---------------------+-----------------+
 
 
 
 
 Care Team Providers
 
 
+-----------------------+------+-------------+
| Care Team Member Name | Role | Phone       |
+-----------------------+------+-------------+
 PCP  | Unavailable |
+-----------------------+------+-------------+
 
 
 
 Encounter Details
 
 
+--------+-------------+---------------------+----------------------+-------------+
| Date   | Type        | Department          | Care Team            | Description |
+--------+-------------+---------------------+----------------------+-------------+
| / | Orders Only |   [facility]         |   Marzena Moser  |             |
|    |             | NEPHROLOGY  [address]   | M, DO  [address]      |             |
|        |             | [address]   | [address]      |             |
|        |             | [place]     | [place]      |             |
|        |             | [address]          | [address]  [phone]  |             |
|        |             | [phone]        |  [phone] (Fax)  |             |
+--------+-------------+---------------------+----------------------+-------------+
 
 
 
 Social History
 
 
+--------------+-------+-----------+--------+------+
| Tobacco Use  | Types | Packs/Day | Years  | Date |
|              |       |           | Used   |      |
+--------------+-------+-----------+--------+------+
| Never Smoker |       |           |        |      |
+--------------+-------+-----------+--------+------+
 
 
 
+---------------------+---+---+---+
| Smokeless Tobacco:  |   |   |   |
| Never Used          |   |   |   |
+---------------------+---+---+---+
 
 
 
+---------------------------------------------------------------+
| Comments: some second hand smoke exposure, but fairly minimal |
+---------------------------------------------------------------+
 
 
 
+-------------+-------------+---------+----------+
| Alcohol Use | Drinks/Week | oz/Week | Comments |
+-------------+-------------+---------+----------+
| No          |             |         |          |
+-------------+-------------+---------+----------+
 
 
 
 
+------------------+---------------+
| Sex Assigned at  | Date Recorded |
| Birth            |               |
+------------------+---------------+
| Not on file      |               |
+------------------+---------------+
 
 
 
+----------------+-------------+-------------+
| Job Start Date | Occupation  | Industry    |
+----------------+-------------+-------------+
| Not on file    | Not on file | Not on file |
+----------------+-------------+-------------+
 
 
 
+----------------+--------------+------------+
| Travel History | Travel Start | Travel End |
+----------------+--------------+------------+
 
 
 
+-------------------------------------+
| No recent travel history available. |
+-------------------------------------+
 documented as of this encounter
 
 Plan of Treatment
 
 
+--------+-----------+------------+----------------------+-------------------+
| Date   | Type      | Specialty  | Care Team            | Description       |
+--------+-----------+------------+----------------------+-------------------+
| / | Office    | Cardiology |   Tonia Wilson,    |                   |
|    | Visit     |            | [address]   |                   |
|        |           |            | [place]  |                   |
|        |           |            | [address] [phone]  |                   |
|        |           |            |  [phone] (Fax)  |                   |
+--------+-----------+------------+----------------------+-------------------+
| / | Hospital  | Radiology  |   Popeye Townsend, |                   |
|    | Encounter |            |  MD [address] [place] |                   |
|        |           |            |  St.  [place],   |                   |
|        |           |            | [place] [address]             |                   |
|        |           |            | [phone]         |                   |
|        |           |            | [phone] (Fax)   |                   |
+--------+-----------+------------+----------------------+-------------------+
| / | Surgery   | Radiology  |   Popeye Townsend, | CV EP PPM SYSTEM  |
|    |           |            |  MD  [address] | IMPLANT           |
|        |           |            |  St.  [place],   |                   |
|        |           |            | [place] [address]             |                   |
|        |           |            | [phone]         |                   |
|        |           |            | [phone] (Fax)   |                   |
+--------+-----------+------------+----------------------+-------------------+
| 02/10/ | Clinical  | Cardiology |                      |                   |
|    | Support   |            |                      |                   |
+--------+-----------+------------+----------------------+-------------------+
| / | Office    | Cardiology |   Tonia Wilson,    |                   |
|    | Visit     |            | [address]   |                   |
 
|        |           |            | St  [place], [place]  |                   |
|        |           |            | [address] [phone]  |                   |
|        |           |            |  [phone] (Fax)  |                   |
+--------+-----------+------------+----------------------+-------------------+
| / | Off-Site  | Nephrology |   Marzena Moser  |                   |
|    | Visit     |            | DO [address]      |                   |
|        |           |            | [address]      |                   |
|        |           |            | [place]      |                   |
|        |           |            | [id number] [phone]  |                   |
|        |           |            |  [phone] (Fax)  |                   |
+--------+-----------+------------+----------------------+-------------------+
 documented as of this encounter
 
 Visit Diagnoses
 Not on filedocumented in this encounter

## 2020-01-08 NOTE — XMS
Encounter Summary
  Created on: 2020
 
 Viviana Ricci
 External Reference #: [id number]
 : 47
 Sex: Female
 
 Demographics
 
 
+-----------------------+----------------------+
| Address               | [address]      |
|                       | [place]  [address] |
+-----------------------+----------------------+
| Home Phone            | [phone]      |
+-----------------------+----------------------+
| Preferred Language    | Unknown              |
+-----------------------+----------------------+
| Marital Status        | Single               |
+-----------------------+----------------------+
| Nondenominational Affiliation | 1009                 |
+-----------------------+----------------------+
| Race                  | Unknown              |
+-----------------------+----------------------+
| Ethnic Group          | Unknown              |
+-----------------------+----------------------+
 
 
 Author
 
 
+--------------+--------------------------------------------+
| Author       | [facility]  |
|              | and [place]                                |
+--------------+--------------------------------------------+
| Organization | [facility]  |
|              | and [place]                                |
+--------------+--------------------------------------------+
| Address      | Unknown                                    |
+--------------+--------------------------------------------+
| Phone        | Unavailable                                |
+--------------+--------------------------------------------+
 
 
 
 Support
 
 
+----------------+--------------+---------------------+-----------------+
| Name           | Relationship | Address             | Phone           |
+----------------+--------------+---------------------+-----------------+
| Ada/Ed Radhames | ECON         | [address]              | [phone] |
|                |              | [place]  |                 |
|                |              |  [address]              |                 |
+----------------+--------------+---------------------+-----------------+
 
 
 
 
 Care Team Providers
 
 
+------------------------+------+-----------------+
| Care Team Member Name  | Role | Phone           |
+------------------------+------+-----------------+
| Marzena Moser DO | PCP  | [phone] |
+------------------------+------+-----------------+
 
 
 
 Reason for Visit
 
 
+--------+------------------+
| Reason | Comments         |
+--------+------------------+
| Other  | montior and test |
+--------+------------------+
 
 
 
 Encounter Details
 
 
+--------+-----------+----------------------+----------------------+---------------------+
| Date   | Type      | Department           | Care Team            | Description         |
+--------+-----------+----------------------+----------------------+---------------------+
| 10/29/ | Telephone |   [facility]          |   Tonia Wilson,    | Patrice (celina and  |
|    |           | CARDIOLOGY  [address]    | ARNP  [address]   | test)               |
|        |           | [address]  [place], | St  [place], [place]  |                     |
|        |           |  [place] [address]       | [address]  [phone]  |                     |
|        |           | [phone]         |  [phone] (Fax)  |                     |
+--------+-----------+----------------------+----------------------+---------------------+
 
 
 
 Social History
 
 
+--------------+-------+-----------+--------+------+
| Tobacco Use  | Types | Packs/Day | Years  | Date |
|              |       |           | Used   |      |
+--------------+-------+-----------+--------+------+
| Never Smoker |       |           |        |      |
+--------------+-------+-----------+--------+------+
 
 
 
+---------------------+---+---+---+
| Smokeless Tobacco:  |   |   |   |
| Never Used          |   |   |   |
+---------------------+---+---+---+
 
 
 
+---------------------------------------------------------------+
| Comments: some second hand smoke exposure, but fairly minimal |
+---------------------------------------------------------------+
 
 
 
 
+-------------+-------------+---------+----------+
| Alcohol Use | Drinks/Week | oz/Week | Comments |
+-------------+-------------+---------+----------+
| No          |             |         |          |
+-------------+-------------+---------+----------+
 
 
 
+------------------+---------------+
| Sex Assigned at  | Date Recorded |
| Birth            |               |
+------------------+---------------+
| Not on file      |               |
+------------------+---------------+
 
 
 
+----------------+-------------+-------------+
| Job Start Date | Occupation  | Industry    |
+----------------+-------------+-------------+
| Not on file    | Not on file | Not on file |
+----------------+-------------+-------------+
 
 
 
+----------------+--------------+------------+
| Travel History | Travel Start | Travel End |
+----------------+--------------+------------+
 
 
 
+-------------------------------------+
| No recent travel history available. |
+-------------------------------------+
 documented as of this encounter
 
 Plan of Treatment
 
 
+--------+-----------+------------+----------------------+-------------------+
| Date   | Type      | Specialty  | Care Team            | Description       |
+--------+-----------+------------+----------------------+-------------------+
| / | Office    | Cardiology |   Tonia Wilson,    |                   |
|    | Visit     |            | ARNP  [address]   |                   |
|        |           |            | St [place], [place]  |                   |
|        |           |            | [address]  [phone]  |                   |
|        |           |            |  [phone] (Fax)  |                   |
+--------+-----------+------------+----------------------+-------------------+
| / | Hospital  | Radiology  |   Popeye Townsend, |                   |
|    | Encounter |            |  MD  [address] |                   |
|        |           |            |  [place],   |                   |
|        |           |            | [place] [address]             |                   |
|        |           |            | [phone]         |                   |
|        |           |            | [phone] (Fax)   |                   |
+--------+-----------+------------+----------------------+-------------------+
| / | Surgery   | Radiology  |   Popeye Townsend, | CV EP PPM SYSTEM  |
|    |           |            |  MD  [address] | IMPLANT           |
 
|        |           |            |  [place],   |                   |
|        |           |            | [place] [address]             |                   |
|        |           |            | [phone]         |                   |
|        |           |            | [phone] (Fax)   |                   |
+--------+-----------+------------+----------------------+-------------------+
| 02/10/ | Clinical  | Cardiology |                      |                   |
|    | Support   |            |                      |                   |
+--------+-----------+------------+----------------------+-------------------+
| / | Office    | Cardiology |   RakeshTonia andre,    |                   |
|    | Visit     |            | [address]  [address]   |                   |
|        |           |            | [place]  |                   |
|        |           |            | [address] [phone]  |                   |
|        |           |            |  [phone] (Fax)  |                   |
+--------+-----------+------------+----------------------+-------------------+
| / | Off-Site  | Nephrology |   Marzena Moser  |                   |
|    | Visit     |            | DO [place]TI[place]  [address] [place] [address]      |                   |
|        |           |            | [address]      |                   |
|        |           |            | [place]      |                   |
|        |           |            | [address] [phone]  |                   |
|        |           |            |  [phone] (Fax)  |                   |
+--------+-----------+------------+----------------------+-------------------+
 documented as of this encounter
 
 Visit Diagnoses
 Not on filedocumented in this encounter

## 2020-01-08 NOTE — XMS
Encounter Summary
  Created on: 2020
 
 Viviana Ricci
 External Reference #: [id number]
 : 47
 Sex: Female
 
 Demographics
 
 
+-----------------------+----------------------+
| Address               | [address]      |
|                       | [place]  [address] |
+-----------------------+----------------------+
| Home Phone            | [phone]      |
+-----------------------+----------------------+
| Preferred Language    | Unknown              |
+-----------------------+----------------------+
| Marital Status        | Single               |
+-----------------------+----------------------+
| Buddhism Affiliation | 1009                 |
+-----------------------+----------------------+
| Race                  | Unknown              |
+-----------------------+----------------------+
| Ethnic Group          | Unknown              |
+-----------------------+----------------------+
 
 
 Author
 
 
+--------------+--------------------------------------------+
| Author       | [facility]  |
|              | and [place]                                |
+--------------+--------------------------------------------+
| Organization | [facility]  |
|              | and [place]                                |
+--------------+--------------------------------------------+
| Address      | Unknown                                    |
+--------------+--------------------------------------------+
| Phone        | Unavailable                                |
+--------------+--------------------------------------------+
 
 
 
 Support
 
 
+----------------+--------------+---------------------+-----------------+
| Name           | Relationship | Address             | Phone           |
+----------------+--------------+---------------------+-----------------+
| Ada/Ed Radhames | ECON         | [address]              | [phone] |
|                |              | [place]  |                 |
|                |              |  [address]              |                 |
+----------------+--------------+---------------------+-----------------+
 
 
 
 
 Care Team Providers
 
 
+-----------------------+------+-------------+
| Care Team Member Name | Role | Phone       |
+-----------------------+------+-------------+
 PCP  | Unavailable |
+-----------------------+------+-------------+
 
 
 
 Encounter Details
 
 
+--------+-------------+---------------------+----------------------+-------------+
| Date   | Type        | Department          | Care Team            | Description |
+--------+-------------+---------------------+----------------------+-------------+
| 10/17/ | Documentati |   [facility]         |   Marzena Moser  |             |
| 2016   | on          | NEPHROLOGY  [address]   | M, DO  [address]      |             |
|        |             | [address]   | [address]      |             |
|        |             | [place]     | [place]      |             |
|        |             | [address]          | [address]  [phone]  |             |
|        |             | [phone]        |  [phone] (Fax)  |             |
+--------+-------------+---------------------+----------------------+-------------+
 
 
 
 Social History
 
 
+--------------+-------+-----------+--------+------+
| Tobacco Use  | Types | Packs/Day | Years  | Date |
|              |       |           | Used   |      |
+--------------+-------+-----------+--------+------+
| Never Smoker |       |           |        |      |
+--------------+-------+-----------+--------+------+
 
 
 
+---------------------+---+---+---+
| Smokeless Tobacco:  |   |   |   |
| Never Used          |   |   |   |
+---------------------+---+---+---+
 
 
 
+---------------------------------------------------------------+
| Comments: some second hand smoke exposure, but fairly minimal |
+---------------------------------------------------------------+
 
 
 
+-------------+-------------+---------+----------+
| Alcohol Use | Drinks/Week | oz/Week | Comments |
+-------------+-------------+---------+----------+
| No          |             |         |          |
+-------------+-------------+---------+----------+
 
 
 
 
+------------------+---------------+
| Sex Assigned at  | Date Recorded |
| Birth            |               |
+------------------+---------------+
| Not on file      |               |
+------------------+---------------+
 
 
 
+----------------+-------------+-------------+
| Job Start Date | Occupation  | Industry    |
+----------------+-------------+-------------+
| Not on file    | Not on file | Not on file |
+----------------+-------------+-------------+
 
 
 
+----------------+--------------+------------+
| Travel History | Travel Start | Travel End |
+----------------+--------------+------------+
 
 
 
+-------------------------------------+
| No recent travel history available. |
+-------------------------------------+
 documented as of this encounter
 
 Progress Notes
 Marlin Johnson - 10/17/2016  3:46 PM PDTOutside records: received driving [employer] paper
work, from patient. Sent to scan.
 
 Patient called to check on status paperwork, she is updated it will be mailed back to her.
 
 Mailed copy of paperwork to patient.Electronically signed by Marlin Johnson at 10/17/2016 
 3:48 PM PDTdocumented in this encounter
 
 Plan of Treatment
 
 
+--------+-----------+------------+----------------------+-------------------+
| Date   | Type      | Specialty  | Care Team            | Description       |
+--------+-----------+------------+----------------------+-------------------+
| / | Office    | Cardiology |   Tonia Wilson,    |                   |
|    | Visit     |            | ARNP  [address]   |                   |
|        |           |            | St  [place], [place]  |                   |
|        |           |            | [address]  [phone]  |                   |
|        |           |            |  [phone] (Fax)  |                   |
+--------+-----------+------------+----------------------+-------------------+
| / | Hospital  | Radiology  |   Popeye Townsend, |                   |
|    | Encounter |            |  MD  [address] |                   |
|        |           |            |  [place],   |                   |
|        |           |            | [place] [address]             |                   |
|        |           |            | [phone]         |                   |
|        |           |            | [phone] (Fax)   |                   |
+--------+-----------+------------+----------------------+-------------------+
| / | Surgery   | Radiology  |   Popeye Townsend, | CV EP PPM SYSTEM  |
|    |           |            |  MD  [address] | IMPLANT           |
|        |           |            |  St.  [place],   |                   |
 
|        |           |            | [place] [address]             |                   |
|        |           |            | [phone]         |                   |
|        |           |            | [phone] (Fax)   |                   |
+--------+-----------+------------+----------------------+-------------------+
| 02/10/ | Clinical  | Cardiology |                      |                   |
|    | Support   |            |                      |                   |
+--------+-----------+------------+----------------------+-------------------+
| / | Office    | Cardiology |   Tonia Wilson,    |                   |
|    | Visit     |            | POP  [address]   |                   |
|        |           |            | [place]  |                   |
|        |           |            | [address]  [phone]  |                   |
|        |           |            |  [phone] (Fax)  |                   |
+--------+-----------+------------+----------------------+-------------------+
| / | Off-Site  | Nephrology |   Marzena Moser  |                   |
|    | Visit     |            | DO ETIENNE  [address] [place] [address]      |                   |
|        |           |            | [address]      |                   |
|        |           |            | [place]      |                   |
|        |           |            | [address] [phone]  |                   |
|        |           |            |  [phone] (Fax)  |                   |
+--------+-----------+------------+----------------------+-------------------+
 documented as of this encounter
 
 Visit Diagnoses
 Not on filedocumented in this encounter

## 2020-01-08 NOTE — XMS
Encounter Summary
  Created on: 2020
 
 Viviana Ricci
 External Reference #: [id number]
 : 47
 Sex: Female
 
 Demographics
 
 
+-----------------------+----------------------+
| Address               | [address]      |
|                       | [place]  [address] |
+-----------------------+----------------------+
| Home Phone            | [phone]      |
+-----------------------+----------------------+
| Preferred Language    | Unknown              |
+-----------------------+----------------------+
| Marital Status        | Single               |
+-----------------------+----------------------+
| Oriental orthodox Affiliation | 1009                 |
+-----------------------+----------------------+
| Race                  | Unknown              |
+-----------------------+----------------------+
| Ethnic Group          | Unknown              |
+-----------------------+----------------------+
 
 
 Author
 
 
+--------------+--------------------------------------------+
| Author       | [facility]  |
|              | and [place]                                |
+--------------+--------------------------------------------+
| Organization | [facility]  |
|              | and [place]                                |
+--------------+--------------------------------------------+
| Address      | Unknown                                    |
+--------------+--------------------------------------------+
| Phone        | Unavailable                                |
+--------------+--------------------------------------------+
 
 
 
 Support
 
 
+----------------+--------------+---------------------+-----------------+
| Name           | Relationship | Address             | Phone           |
+----------------+--------------+---------------------+-----------------+
| Ada/Ed Radhames | ECON         | [address]              | [phone] |
|                |              | [place]  |                 |
|                |              |  [address]              |                 |
+----------------+--------------+---------------------+-----------------+
 
 
 
 
 Care Team Providers
 
 
+-----------------------+------+-------------+
| Care Team Member Name | Role | Phone       |
+-----------------------+------+-------------+
 PCP  | Unavailable |
+-----------------------+------+-------------+
 
 
 
 Encounter Details
 
 
+--------+----------+---------------------+----------------------+-------------+
| Date   | Type     | Department          | Care Team            | Description |
+--------+----------+---------------------+----------------------+-------------+
| / | Abstract |   [facility] [place]         |   Marzena Moser  |             |
|    |          | NEPHROLOGY  [address]   | M, DO  [address]      |             |
|        |          | [address]   | [address]      |             |
|        |          | [place], [place]     | [place]      |             |
|        |          | [address]          | [address]  [phone]  |             |
|        |          | [phone]        |  [phone] (Fax)  |             |
+--------+----------+---------------------+----------------------+-------------+
 
 
 
 Social History
 
 
+--------------+-------+-----------+--------+------+
| Tobacco Use  | Types | Packs/Day | Years  | Date |
|              |       |           | Used   |      |
+--------------+-------+-----------+--------+------+
| Never Smoker |       |           |        |      |
+--------------+-------+-----------+--------+------+
 
 
 
+---------------------+---+---+---+
| Smokeless Tobacco:  |   |   |   |
| Never Used          |   |   |   |
+---------------------+---+---+---+
 
 
 
+---------------------------------------------------------------+
| Comments: some second hand smoke exposure, but fairly minimal |
+---------------------------------------------------------------+
 
 
 
+-------------+-------------+---------+----------+
| Alcohol Use | Drinks/Week | oz/Week | Comments |
+-------------+-------------+---------+----------+
| No          |             |         |          |
+-------------+-------------+---------+----------+
 
 
 
 
+------------------+---------------+
| Sex Assigned at  | Date Recorded |
| Birth            |               |
+------------------+---------------+
| Not on file      |               |
+------------------+---------------+
 
 
 
+----------------+-------------+-------------+
| Job Start Date | Occupation  | Industry    |
+----------------+-------------+-------------+
| Not on file    | Not on file | Not on file |
+----------------+-------------+-------------+
 
 
 
+----------------+--------------+------------+
| Travel History | Travel Start | Travel End |
+----------------+--------------+------------+
 
 
 
+-------------------------------------+
| No recent travel history available. |
+-------------------------------------+
 documented as of this encounter
 
 Plan of Treatment
 
 
+--------+-----------+------------+----------------------+-------------------+
| Date   | Type      | Specialty  | Care Team            | Description       |
+--------+-----------+------------+----------------------+-------------------+
| / | Office    | Cardiology |   Tonia Wilson,    |                   |
|    | Visit     |            | ARNJESSICA  [address]   |                   |
|        |           |            | [place]  |                   |
|        |           |            | [address]  [phone]  |                   |
|        |           |            |  [phone] (Fax)  |                   |
+--------+-----------+------------+----------------------+-------------------+
| / | Hospital  | Radiology  |   Popeye Townsend, |                   |
|    | Encounter |            |  MD  [address] |                   |
|        |           |            |  St.  [place],   |                   |
|        |           |            | [place] [address]             |                   |
|        |           |            | [phone]         |                   |
|        |           |            | [phone] (Fax)   |                   |
+--------+-----------+------------+----------------------+-------------------+
| / | Surgery   | Radiology  |   Popeye Townsend, | CV EP PPM SYSTEM  |
|    |           |            |  MD  [address] | IMPLANT           |
|        |           |            |  St.  [place],   |                   |
|        |           |            | [place] [address]             |                   |
|        |           |            | [phone]         |                   |
|        |           |            | [phone] (Fax)   |                   |
+--------+-----------+------------+----------------------+-------------------+
| 02/10/ | Clinical  | Cardiology |                      |                   |
|    | Support   |            |                      |                   |
+--------+-----------+------------+----------------------+-------------------+
| / | Office    | Cardiology |   Tonia Wilson,    |                   |
|    | Visit     |            | ARNP  [address]   |                   |
 
|        |           |            | St  [place], [place]  |                   |
|        |           |            | [address]  [phone]  |                   |
|        |           |            |  [phone] (Fax)  |                   |
+--------+-----------+------------+----------------------+-------------------+
| / | Off-Site  | Nephrology |   Marzena Moser  |                   |
| 2020   | Visit     |            | M,   [address] [place] [address]      |                   |
|        |           |            | [address]      |                   |
|        |           |            | [place]      |                   |
|        |           |            | [address]  [phone]  |                   |
|        |           |            |  [phone] (Fax)  |                   |
+--------+-----------+------------+----------------------+-------------------+
 documented as of this encounter
 
 Procedures
 
 
+----------------------+--------+------------+----------------------+----------------------+
| Procedure Name       | Priori | Date/Time  | Associated Diagnosis | Comments             |
|                      | ty     |            |                      |                      |
+----------------------+--------+------------+----------------------+----------------------+
| EXTERNAL LAB: PTH,   | Routin | 2016 |                      |   Results for this   |
| INTACT               | e      |            |                      | procedure are in the |
|                      |        |            |                      |  results section.    |
+----------------------+--------+------------+----------------------+----------------------+
| CULTURE, URINE,      | Routin | 2016 |                      |   Results for this   |
| REFLEXIVE            | e      |            |                      | procedure are in the |
|                      |        |            |                      |  results section.    |
+----------------------+--------+------------+----------------------+----------------------+
| BK VIRUS,NAAT,URINE, | Routin | 2016 |                      |   Results for this   |
|  QNT,(VIRACOR)       | e      |            |                      | procedure are in the |
|                      |        |            |                      |  results section.    |
+----------------------+--------+------------+----------------------+----------------------+
 documented in this encounter
 
 Results
 Culture, Urine, Reflexive (2016)
 
+----------+
| Specimen |
+----------+
|          |
+----------+
 
 
 
+------------------------------------------------------------------------+--------------+
| Impressions                                                            | Performed At |
+------------------------------------------------------------------------+--------------+
|   16 Over 100,000 CFU/mL Lactose , Identification to     |              |
| follow.     16 Lactose  identified as Escherichia coli.  |              |
|                                                                        |              |
+------------------------------------------------------------------------+--------------+
 
 
 
+------------------+------------------+--------+----------------+
| Organism         | Antibiotic       | Method | Susceptibility |
+------------------+------------------+--------+----------------+
| Escherichia coli | Amoxicillin +    |        |   Sensitive    |
|                  | Clavulanate      |        |                |
 
+------------------+------------------+--------+----------------+
| Escherichia coli | Piperacillin +   |        |   Sensitive    |
|                  | Tazobactam       |        |                |
+------------------+------------------+--------+----------------+
| Escherichia coli | Cefazolin        |        |   Sensitive    |
+------------------+------------------+--------+----------------+
| Escherichia coli | Ceftriaxone      |        |   Sensitive    |
+------------------+------------------+--------+----------------+
| Escherichia coli | Cefepime         |        |   Sensitive    |
+------------------+------------------+--------+----------------+
| Escherichia coli | Aztreonam        |        |   Sensitive    |
+------------------+------------------+--------+----------------+
| Escherichia coli | Ertapenem        |        |   Sensitive    |
+------------------+------------------+--------+----------------+
| Escherichia coli | Imipenem         |        |   Sensitive    |
+------------------+------------------+--------+----------------+
| Escherichia coli | Gentamicin       |        |   Sensitive    |
+------------------+------------------+--------+----------------+
| Escherichia coli | Ciprofloxacin    |        |   Sensitive    |
+------------------+------------------+--------+----------------+
| Escherichia coli | Levofloxacin     |        |   Sensitive    |
+------------------+------------------+--------+----------------+
| Escherichia coli | Tetracycline     |        |   Sensitive    |
+------------------+------------------+--------+----------------+
| Escherichia coli | Nitrofurantoin   |        |   Sensitive    |
+------------------+------------------+--------+----------------+
| Escherichia coli | Trimethoprim +   |        |   Sensitive    |
|                  | Sulfamethoxazole |        |                |
+------------------+------------------+--------+----------------+
| Escherichia coli | Ampicillin       |        |   Intermediate |
+------------------+------------------+--------+----------------+
 External Lab: PTH, Intact (2016)
 
+-------------+-----------+-----------+------------+--------------+
| Component   | Value     | Ref Range | Performed  | Pathologist  |
|             |           |           | At         | Signature    |
+-------------+-----------+-----------+------------+--------------+
| PTH Intact, | 193.0 (A) | 15 - 65   |            |              |
|  External   |           |           |            |              |
+-------------+-----------+-----------+------------+--------------+
 
 
 
+----------+
| Specimen |
+----------+
|          |
+----------+
 Bk Virus, Quant, Urine ([facility]) (2016)
 
+-------------+-----------+-----------+-------------+--------------+
| Component   | Value     | Ref Range | Performed   | Pathologist  |
|             |           |           | At          | Signature    |
+-------------+-----------+-----------+-------------+--------------+
| BK Virus    | equivocal |           | [facility]  |              |
| DNA, Urine, |           |           | [facility]    |              |
|  PCR        |           |           | [facility]     |              |
|             |           |           | [facility] -    |              |
|             |           |           | LABORATORY  |              |
+-------------+-----------+-----------+-------------+--------------+
 
 
 
 
+-----------------+
| Specimen        |
+-----------------+
| Urine specimen  |
| (specimen)      |
+-----------------+
 
 
 
+----------------------+--------------------+--------------------+----------------+
| Performing           | Address            | City/State/Zipcode | Phone Number   |
| Organization         |                    |                    |                |
+----------------------+--------------------+--------------------+----------------+
|   [facility]     |   [address] | [place]    |   [phone] |
| [facility]  |                    | [address]              |                |
| - LABORATORY         |                    |                    |                |
+----------------------+--------------------+--------------------+----------------+
 documented in this encounter
 
 Visit Diagnoses
 Not on filedocumented in this encounter

## 2020-01-08 NOTE — XMS
Encounter Summary
  Created on: 2020
 
 Viviana Ricci
 External Reference #: [id number]
 : 47
 Sex: Female
 
 Demographics
 
 
+-----------------------+----------------------+
| Address               | [address]      |
|                       | [place]  [address] |
+-----------------------+----------------------+
| Home Phone            | [phone]      |
+-----------------------+----------------------+
| Preferred Language    | Unknown              |
+-----------------------+----------------------+
| Marital Status        | Single               |
+-----------------------+----------------------+
| Congregation Affiliation | 1009                 |
+-----------------------+----------------------+
| Race                  | Unknown              |
+-----------------------+----------------------+
| Ethnic Group          | Unknown              |
+-----------------------+----------------------+
 
 
 Author
 
 
+--------------+--------------------------------------------+
| Author       | [facility]  |
|              | and [place]                                |
+--------------+--------------------------------------------+
| Organization | [facility] [place]  |
|              | and [place]                                |
+--------------+--------------------------------------------+
| Address      | Unknown                                    |
+--------------+--------------------------------------------+
| Phone        | Unavailable                                |
+--------------+--------------------------------------------+
 
 
 
 Support
 
 
+----------------+--------------+---------------------+-----------------+
| Name           | Relationship | Address             | Phone           |
+----------------+--------------+---------------------+-----------------+
| Ada/Ed Radhames | ECON         | [place]              | [phone] |
|                |              | [place]  |                 |
|                |              |  [address]              |                 |
+----------------+--------------+---------------------+-----------------+
 
 
 
 
 Care Team Providers
 
 
+-----------------------+------+-------------+
| Care Team Member Name | Role | Phone       |
+-----------------------+------+-------------+
 PCP  | Unavailable |
+-----------------------+------+-------------+
 
 
 
 Encounter Details
 
 
+--------+----------+---------------------+----------------------+-------------+
| Date   | Type     | Department          | Care Team            | Description |
+--------+----------+---------------------+----------------------+-------------+
| 10/14/ | Abstract |   [facility] [place]         |   Marzena Moser  |             |
|    |          | NEPHROLOGY  [address]   | M, DO  [address]      |             |
|        |          | [address]   | [address]      |             |
|        |          | [place], [place]     | [place]      |             |
|        |          | [address]          | [address]  [phone]  |             |
|        |          | [phone]        |  [phone] (Fax)  |             |
+--------+----------+---------------------+----------------------+-------------+
 
 
 
 Social History
 
 
+--------------+-------+-----------+--------+------+
| Tobacco Use  | Types | Packs/Day | Years  | Date |
|              |       |           | Used   |      |
+--------------+-------+-----------+--------+------+
| Never Smoker |       |           |        |      |
+--------------+-------+-----------+--------+------+
 
 
 
+---------------------+---+---+---+
| Smokeless Tobacco:  |   |   |   |
| Never Used          |   |   |   |
+---------------------+---+---+---+
 
 
 
+---------------------------------------------------------------+
| Comments: some second hand smoke exposure, but fairly minimal |
+---------------------------------------------------------------+
 
 
 
+-------------+-------------+---------+----------+
| Alcohol Use | Drinks/Week | oz/Week | Comments |
+-------------+-------------+---------+----------+
| No          |             |         |          |
+-------------+-------------+---------+----------+
 
 
 
 
+------------------+---------------+
| Sex Assigned at  | Date Recorded |
| Birth            |               |
+------------------+---------------+
| Not on file      |               |
+------------------+---------------+
 
 
 
+----------------+-------------+-------------+
| Job Start Date | Occupation  | Industry    |
+----------------+-------------+-------------+
| Not on file    | Not on file | Not on file |
+----------------+-------------+-------------+
 
 
 
+----------------+--------------+------------+
| Travel History | Travel Start | Travel End |
+----------------+--------------+------------+
 
 
 
+-------------------------------------+
| No recent travel history available. |
+-------------------------------------+
 documented as of this encounter
 
 [facility] Marlin Samson - 10/14/2016  8:23 AM PDTOutside records: received driving privleges paper
work 10/3/16, from patient. Sent to scan.Electronically signed by Marlin Johnson at 10/14/2
016  8:28 AM PDTdocumented in this encounter
 
 Plan of Treatment
 
 
+--------+-----------+------------+----------------------+-------------------+
| Date   | Type      | Specialty  | Care Team            | Description       |
+--------+-----------+------------+----------------------+-------------------+
| / | Office    | Cardiology |   Tonia Wilson,    |                   |
|    | Visit     |            | ARNP  [address]   |                   |
|        |           |            |   [place]  |                   |
|        |           |            | [address]  [phone]  |                   |
|        |           |            |  [phone] (Fax)  |                   |
+--------+-----------+------------+----------------------+-------------------+
| / | Hospital  | Radiology  |   Popeye Townsend, |                   |
2020   | Encounter |            |  MD  [address] |                   |
|        |           |            |  [place],   |                   |
|        |           |            | [place] [address]             |                   |
|        |           |            | [phone]         |                   |
|        |           |            | [phone] (Fax)   |                   |
+--------+-----------+------------+----------------------+-------------------+
| / | Surgery   | Radiology  |   Popeye Townsend, | CV EP PPM SYSTEM  |
|    |           |            |  MD  [address] | IMPLANT           |
|        |           |            |  St[place]ie [place],   |                   |
|        |           |            | [place] [address]             |                   |
|        |           |            | [phone]         |                   |
|        |           |            | [phone] (Fax)   |                   |
+--------+-----------+------------+----------------------+-------------------+
 
| 02/10/ | Clinical  | Cardiology |                      |                   |
|    | Support   |            |                      |                   |
+--------+-----------+------------+----------------------+-------------------+
| / | Office    | Cardiology |   Tonia Wilson,    |                   |
|    | Visit     |            | BELKIS  [address]   |                   |
|        |           |            | [place]  |                   |
|        |           |            | [address]  [phone]  |                   |
|        |           |            |  [phone] (Fax)  |                   |
+--------+-----------+------------+----------------------+-------------------+
| / | Off-Site  | Nephrology |   Marzena Moser  |                   |
|    | Visit     |            | M, DO  [address]      |                   |
|        |           |            | [address]      |                   |
|        |           |            | [place]      |                   |
|        |           |            | [address]  [phone]  |                   |
|        |           |            |  [phone] (Fax)  |                   |
+--------+-----------+------------+----------------------+-------------------+
 documented as of this encounter
 
 Visit Diagnoses
 Not on filedocumented in this encounter

## 2020-01-08 NOTE — XMS
Encounter Summary
  Created on: 2020
 
 Viviana Ricci
 External Reference #: [id number]
 : 47
 Sex: Female
 
 Demographics
 
 
+-----------------------+----------------------+
| Address               | [address]      |
|                       | [place]  [address] |
+-----------------------+----------------------+
| Home Phone            | [phone]      |
+-----------------------+----------------------+
| Preferred Language    | Unknown              |
+-----------------------+----------------------+
| Marital Status        | Single               |
+-----------------------+----------------------+
| Sabianism Affiliation | 1009                 |
+-----------------------+----------------------+
| Race                  | Unknown              |
+-----------------------+----------------------+
| Ethnic Group          | Unknown              |
+-----------------------+----------------------+
 
 
 Author
 
 
+--------------+--------------------------------------------+
| Author       | [facility] [place]  |
|              | and [place]                                |
+--------------+--------------------------------------------+
| Organization | [facility] [place]  |
|              | and [place]                                |
+--------------+--------------------------------------------+
| Address      | Unknown                                    |
+--------------+--------------------------------------------+
| Phone        | Unavailable                                |
+--------------+--------------------------------------------+
 
 
 
 Support
 
 
+----------------+--------------+---------------------+-----------------+
| Name           | Relationship | Address             | Phone           |
+----------------+--------------+---------------------+-----------------+
| Ada/Ed Radhames | ECON         | [place]              | [phone] |
|                |              | [place]  |                 |
|                |              |  [address]              |                 |
+----------------+--------------+---------------------+-----------------+
 
 
 
 
 Care Team Providers
 
 
+-----------------------+------+-------------+
| Care Team Member Name | Role | Phone       |
+-----------------------+------+-------------+
 PCP  | Unavailable |
+-----------------------+------+-------------+
 
 
 
 Encounter Details
 
 
+--------+----------+---------------------+----------------------+-------------+
| Date   | Type     | Department          | Care Team            | Description |
+--------+----------+---------------------+----------------------+-------------+
| / | Abstract |   [facility] [place]         |   Marzena Moser  |             |
|    |          | NEPHROLOGY  [address]   | M, DO  [address]      |             |
|        |          | [address]   | [address]      |             |
|        |          | [place], [place]     | [place]      |             |
|        |          | [address]          | [address]  [phone]  |             |
|        |          | [phone]        |  [phone] (Fax)  |             |
+--------+----------+---------------------+----------------------+-------------+
 
 
 
 Social History
 
 
+--------------+-------+-----------+--------+------+
| Tobacco Use  | Types | Packs/Day | Years  | Date |
|              |       |           | Used   |      |
+--------------+-------+-----------+--------+------+
| Never Smoker |       |           |        |      |
+--------------+-------+-----------+--------+------+
 
 
 
+---------------------+---+---+---+
| Smokeless Tobacco:  |   |   |   |
| Never Used          |   |   |   |
+---------------------+---+---+---+
 
 
 
+---------------------------------------------------------------+
| Comments: some second hand smoke exposure, but fairly minimal |
+---------------------------------------------------------------+
 
 
 
+-------------+-------------+---------+----------+
| Alcohol Use | Drinks/Week | oz/Week | Comments |
+-------------+-------------+---------+----------+
| No          |             |         |          |
+-------------+-------------+---------+----------+
 
 
 
 
+------------------+---------------+
| Sex Assigned at  | Date Recorded |
| Birth            |               |
+------------------+---------------+
| Not on file      |               |
+------------------+---------------+
 
 
 
+----------------+-------------+-------------+
| Job Start Date | Occupation  | Industry    |
+----------------+-------------+-------------+
| Not on file    | Not on file | Not on file |
+----------------+-------------+-------------+
 
 
 
+----------------+--------------+------------+
| Travel History | Travel Start | Travel End |
+----------------+--------------+------------+
 
 
 
+-------------------------------------+
| No recent travel history available. |
+-------------------------------------+
 documented as of this encounter
 
 Plan of Treatment
 
 
+--------+-----------+------------+----------------------+-------------------+
| Date   | Type      | Specialty  | Care Team            | Description       |
+--------+-----------+------------+----------------------+-------------------+
| / | Office    | Cardiology |   Tonia Wilson,    |                   |
|    | Visit     |            | ARNJESSICA  [address]   |                   |
|        |           |            | [place]  |                   |
|        |           |            | [address]  [phone]  |                   |
|        |           |            |  [phone] (Fax)  |                   |
+--------+-----------+------------+----------------------+-------------------+
| / | Hospital  | Radiology  |   Popeye Townsend, |                   |
|    | Encounter |            |  MD  [address] |                   |
|        |           |            |  St.  [place],   |                   |
|        |           |            | [place] [address]             |                   |
|        |           |            | [phone]         |                   |
|        |           |            | [phone] (Fax)   |                   |
+--------+-----------+------------+----------------------+-------------------+
| / | Surgery   | Radiology  |   Popeye Townsend, | CV EP PPM SYSTEM  |
|    |           |            |  MD  [address] | IMPLANT           |
|        |           |            |  St.  [place],   |                   |
|        |           |            | [place] [address]             |                   |
|        |           |            | [phone]         |                   |
|        |           |            | [phone] (Fax)   |                   |
+--------+-----------+------------+----------------------+-------------------+
| 02/10/ | Clinical  | Cardiology |                      |                   |
|    | Support   |            |                      |                   |
+--------+-----------+------------+----------------------+-------------------+
| / | Office    | Cardiology |   Tonia Wilson,    |                   |
|    | Visit     |            | ARNP  [address]   |                   |
 
|        |           |            | St  [place], [place]  |                   |
|        |           |            | [address]  [phone]  |                   |
|        |           |            |  [phone] (Fax)  |                   |
+--------+-----------+------------+----------------------+-------------------+
| / | Off-Site  | Nephrology |   Marzena Moser  |                   |
|    | Visit     |            | ETIENNE,   [address] [place] [address]      |                   |
|        |           |            | [address]      |                   |
|        |           |            | [place]      |                   |
|        |           |            | [address]  [phone]  |                   |
|        |           |            |  [phone] (Fax)  |                   |
+--------+-----------+------------+----------------------+-------------------+
 documented as of this encounter
 
 Procedures
 
 
+---------------------+--------+------------+----------------------+----------------------+
| Procedure Name      | Priori | Date/Time  | Associated Diagnosis | Comments             |
|                     | ty     |            |                      |                      |
+---------------------+--------+------------+----------------------+----------------------+
| TACROLIMUS ()  | Routin | 2017 |                      |   Results for this   |
| TROUGH              | e      |            |                      | procedure are in the |
|                     |        |            |                      |  results section.    |
+---------------------+--------+------------+----------------------+----------------------+
 documented in this encounter
 
 Results
 Tacrolimus () Trough (2017)
 
+-------------+-------+-----------+------------+--------------+
| Component   | Value | Ref Range | Performed  | Pathologist  |
|             |       |           | At         | Signature    |
+-------------+-------+-----------+------------+--------------+
| Tacrolimus, | 6.6   |           |            |              |
|  CMIA,      |       |           |            |              |
| External    |       |           |            |              |
+-------------+-------+-----------+------------+--------------+
 
 
 
+----------+
| Specimen |
+----------+
| Blood    |
+----------+
 documented in this encounter
 
 Visit Diagnoses
 Not on filedocumented in this encounter

## 2020-01-08 NOTE — XMS
Encounter Summary
  Created on: 2020
 
 Viviana Ricci
 External Reference #: [id number]
 : 47
 Sex: Female
 
 Demographics
 
 
+-----------------------+----------------------+
| Address               | [address]      |
|                       | [place]  [address] |
+-----------------------+----------------------+
| Home Phone            | [phone]      |
+-----------------------+----------------------+
| Preferred Language    | Unknown              |
+-----------------------+----------------------+
| Marital Status        | Single               |
+-----------------------+----------------------+
| Baptism Affiliation | [id number]                 |
+-----------------------+----------------------+
| Race                  | Unknown              |
+-----------------------+----------------------+
| Ethnic Group          | Unknown              |
+-----------------------+----------------------+
 
 
 Author
 
 
+--------------+--------------------------------------------+
| Author       | [facility]  |
|              | and [facility]                                |
+--------------+--------------------------------------------+
| Organization | [facility]  |
|              | and [facility]                                |
+--------------+--------------------------------------------+
| Address      | Unknown                                    |
+--------------+--------------------------------------------+
| Phone        | Unavailable                                |
+--------------+--------------------------------------------+
 
 
 
 Support
 
 
+----------------+--------------+---------------------+-----------------+
| Name           | Relationship | Address             | Phone           |
+----------------+--------------+---------------------+-----------------+
| Ada/Ed Radhames | ECON         | [address]              | [phone] |
|                |              | [place]  |                 |
|                |              |  [address]              |                 |
+----------------+--------------+---------------------+-----------------+
 
 
 
 
 Care Team Providers
 
 
+-----------------------+------+-------------+
| Care Team Member Name | Role | Phone       |
+-----------------------+------+-------------+
 PCP  | Unavailable |
+-----------------------+------+-------------+
 
 
 
 Encounter Details
 
 
+--------+-------------+---------------------+----------------------+-------------+
| Date   | Type        | Department          | Care Team            | Description |
+--------+-------------+---------------------+----------------------+-------------+
| 10/17/ | Documentati |   [facility]         |   Marzena Moser  |             |
| 2016   | on          | NEPHROLOGY  [address]   | M, DO  [address]      |             |
|        |             | [address]   | [address]      |             |
|        |             | [place]     | [place]      |             |
|        |             | [address]          | [address]  [phone]  |             |
|        |             | [phone]        |  [phone] (Fax)  |             |
+--------+-------------+---------------------+----------------------+-------------+
 
 
 
 Social History
 
 
+--------------+-------+-----------+--------+------+
| Tobacco Use  | Types | Packs/Day | Years  | Date |
|              |       |           | Used   |      |
+--------------+-------+-----------+--------+------+
| Never Smoker |       |           |        |      |
+--------------+-------+-----------+--------+------+
 
 
 
+---------------------+---+---+---+
| Smokeless Tobacco:  |   |   |   |
| Never Used          |   |   |   |
+---------------------+---+---+---+
 
 
 
+---------------------------------------------------------------+
| Comments: some second hand smoke exposure, but fairly minimal |
+---------------------------------------------------------------+
 
 
 
+-------------+-------------+---------+----------+
| Alcohol Use | Drinks/Week | oz/Week | Comments |
+-------------+-------------+---------+----------+
| No          |             |         |          |
+-------------+-------------+---------+----------+
 
 
 
 
+------------------+---------------+
| Sex Assigned at  | Date Recorded |
| Birth            |               |
+------------------+---------------+
| Not on file      |               |
+------------------+---------------+
 
 
 
+----------------+-------------+-------------+
| Job Start Date | Occupation  | Industry    |
+----------------+-------------+-------------+
| Not on file    | Not on file | Not on file |
+----------------+-------------+-------------+
 
 
 
+----------------+--------------+------------+
| Travel History | Travel Start | Travel End |
+----------------+--------------+------------+
 
 
 
+-------------------------------------+
| No recent travel history available. |
+-------------------------------------+
 documented as of this encounter
 
 Progress Notes
 Marlin Johnson - 10/17/2016  3:46 PM PDTOutside records: received driving [employer] paper
work, from patient. Sent to scan.
 
 Patient called to check on status paperwork, she is updated it will be mailed back to her.
 
 Mailed copy of paperwork to patient.Electronically signed by Marlin Johnson at 10/17/2016 
 3:48 PM PDTdocumented in this encounter
 
 Plan of Treatment
 
 
+--------+-----------+------------+----------------------+-------------------+
| Date   | Type      | Specialty  | Care Team            | Description       |
+--------+-----------+------------+----------------------+-------------------+
| / | Office    | Cardiology |   Tonia Wilson,    |                   |
|    | Visit     |            | ARNP  [address]   |                   |
|        |           |            | St  [place], [place]  |                   |
|        |           |            | [address]  [phone]  |                   |
|        |           |            |  [phone] (Fax)  |                   |
+--------+-----------+------------+----------------------+-------------------+
| / | Hospital  | Radiology  |   Popeye Townsend, |                   |
|    | Encounter |            |  MD  [address] |                   |
|        |           |            |  [place],   |                   |
|        |           |            | [place] [address]             |                   |
|        |           |            | [phone]         |                   |
|        |           |            | [phone] (Fax)   |                   |
+--------+-----------+------------+----------------------+-------------------+
| / | Surgery   | Radiology  |   Popeye Townsend, | CV EP PPM SYSTEM  |
|    |           |            |  MD  [address] | IMPLANT           |
|        |           |            |  St.  [place],   |                   |
 
|        |           |            | [place] [address]             |                   |
|        |           |            | [phone]         |                   |
|        |           |            | [phone] (Fax)   |                   |
+--------+-----------+------------+----------------------+-------------------+
| 02/10/ | Clinical  | Cardiology |                      |                   |
|    | Support   |            |                      |                   |
+--------+-----------+------------+----------------------+-------------------+
| / | Office    | Cardiology |   Tonia Wilson,    |                   |
|    | Visit     |            | POP  [address]   |                   |
|        |           |            | [place]  |                   |
|        |           |            | [address]  [phone]  |                   |
|        |           |            |  [phone] (Fax)  |                   |
+--------+-----------+------------+----------------------+-------------------+
| / | Off-Site  | Nephrology |   Marzena Moser  |                   |
|    | Visit     |            | DO ETIENNE  [address] [place] [address]      |                   |
|        |           |            | [address]      |                   |
|        |           |            | [place]      |                   |
|        |           |            | [address] [phone]  |                   |
|        |           |            |  [phone] (Fax)  |                   |
+--------+-----------+------------+----------------------+-------------------+
 documented as of this encounter
 
 Visit Diagnoses
 Not on filedocumented in this encounter

## 2020-01-08 NOTE — XMS
Encounter Summary
  Created on: 2020
 
 Viviana Ricci
 External Reference #: [id number]
 : 47
 Sex: Female
 
 Demographics
 
 
+-----------------------+------------------------+
| Address               | [address]           |
|                       | [place]  [address]   |
+-----------------------+------------------------+
| Home Phone            | [phone]        |
+-----------------------+------------------------+
| Preferred Language    | Unknown                |
+-----------------------+------------------------+
| Marital Status        | Single                 |
+-----------------------+------------------------+
| Muslim Affiliation | CAT                    |
+-----------------------+------------------------+
| Race                  | White                  |
+-----------------------+------------------------+
| Ethnic Group          | Not  or  |
+-----------------------+------------------------+
 
 
 Author
 
 
+--------------+------------------------------+
| Author       | [facility] |
+--------------+------------------------------+
| Organization | [facility] |
+--------------+------------------------------+
| Address      | Unknown                      |
+--------------+------------------------------+
| Phone        | Unavailable                  |
+--------------+------------------------------+
 
 
 
 Support
 
 
+---------------+--------------+-----------------+-----------------+
| Name          | Relationship | Address         | Phone           |
+---------------+--------------+-----------------+-----------------+
| Ember Ricci | [address]         | [place]   | [phone] |
+---------------+--------------+-----------------+-----------------+
 
 
 
 Care Team Providers
 
 
 
+-----------------------+------+-------------+
| Care Team Member Name | Role | Phone       |
+-----------------------+------+-------------+
 PCP  | Unavailable |
+-----------------------+------+-------------+
 
 
 
 Encounter Details
 
 
+--------+-------------+----------------------+---------------------+-------------+
| Date   | Type        | Department           | Care Team           | Description |
+--------+-------------+----------------------+---------------------+-------------+
| / | Documentati |   Clinical           |   Kenny Lara,  |             |
|    | on          | Transplant Services  | MD  [address]     |             |
|        |             |  [address] | [address]     |             |
|        |             |  [place]  [place],  | [place], [place]        |             |
|        |             | [place] [address]        | [address]          |             |
|        |             | [phone]         | [phone]        |             |
|        |             |                      | [phone] (Fax)  |             |
+--------+-------------+----------------------+---------------------+-------------+
 
 
 
 Social History
 
 
+----------------+-------+-----------+--------+------+
| Tobacco Use    | Types | Packs/Day | Years  | Date |
|                |       |           | Used   |      |
+----------------+-------+-----------+--------+------+
| Never Assessed |       |           |        |      |
+----------------+-------+-----------+--------+------+
 
 
 
+------------------+---------------+
| Sex Assigned at  | Date Recorded |
| Birth            |               |
+------------------+---------------+
| Not on file      |               |
+------------------+---------------+
 
 
 
+----------------+-------------+-------------+
| Job Start Date | Occupation  | Industry    |
+----------------+-------------+-------------+
| Not on file    | Not on file | Not on file |
+----------------+-------------+-------------+
 
 
 
+----------------+--------------+------------+
| Travel History | Travel Start | Travel End |
+----------------+--------------+------------+
 
 
 
 
+-------------------------------------+
| No recent travel history available. |
+-------------------------------------+
 documented as of this encounter
 
 Plan of Treatment
 Not on filedocumented as of this encounter
 
 Visit Diagnoses
 Not on filedocumented in this encounter

## 2020-01-08 NOTE — XMS
Encounter Summary
  Created on: 2020
 
 Viviana Ricci
 External Reference #: [id number]
 : 47
 Sex: Female
 
 Demographics
 
 
+-----------------------+----------------------+
| Address               | [address]      |
|                       | [place]  [address] |
+-----------------------+----------------------+
| Home Phone            | [phone]      |
+-----------------------+----------------------+
| Preferred Language    | Unknown              |
+-----------------------+----------------------+
| Marital Status        | Single               |
+-----------------------+----------------------+
| Oriental orthodox Affiliation | [id number]                 |
+-----------------------+----------------------+
| Race                  | Unknown              |
+-----------------------+----------------------+
| Ethnic Group          | Unknown              |
+-----------------------+----------------------+
 
 
 Author
 
 
+--------------+--------------------------------------------+
| Author       | [facility]  |
|              | and [facility]                                |
+--------------+--------------------------------------------+
| Organization | [facility]  |
|              | and [facility]                                |
+--------------+--------------------------------------------+
| Address      | Unknown                                    |
+--------------+--------------------------------------------+
| Phone        | Unavailable                                |
+--------------+--------------------------------------------+
 
 
 
 Support
 
 
+----------------+--------------+---------------------+-----------------+
| Name           | Relationship | Address             | Phone           |
+----------------+--------------+---------------------+-----------------+
| Ada/Ed Radhames | ECON         | [address]              | [phone] |
|                |              | [place], [place]  |                 |
|                |              |  [id number]              |                 |
+----------------+--------------+---------------------+-----------------+
 
 
 
 
 Care Team Providers
 
 
+-----------------------+------+-------------+
| Care Team Member Name | Role | Phone       |
+-----------------------+------+-------------+
 PCP  | Unavailable |
+-----------------------+------+-------------+
 
 
 
 Reason for Referral
 Diagnostic/Screening (Routine)
 
+--------+--------+-----------+--------------+--------------+---------------+
| Status | Reason | Specialty | Diagnoses /  | Referred By  | Referred To   |
|        |        |           | Procedures   | Contact      | Contact       |
+--------+--------+-----------+--------------+--------------+---------------+
| Closed |        | Radiology |   Diagnoses  |              |   [facility]    |
|        |        |           |  Pulmonary   | [place],  | [address]  |
|        |        |           | hypertension | Nena GUSMAN,   |  [place], |
|        |        |           |  ([facility])       | MD  [address]    |  [place]           |
|        |        |           | Procedures   | [address]    | [address]    |
|        |        |           | ECHO         | [place], | Phone:        |
|        |        |           | Complete     |  [place] [address]    | [phone]  |
|        |        |           |              |              |  Fax:         |
|        |        |           |              |              | [phone]  |
+--------+--------+-----------+--------------+--------------+---------------+
 
 
 
 
 Reason for Visit
 Diagnostic/Screening (Routine)
 
+--------+--------+-----------+--------------+--------------+---------------+
| Status | Reason | Specialty | Diagnoses /  | Referred By  | Referred To   |
|        |        |           | Procedures   | Contact      | Contact       |
+--------+--------+-----------+--------------+--------------+---------------+
| Closed |        | Radiology |   Diagnoses  |              |   [facility]    |
|        |        |           |  Pulmonary   | [place],  | [address]  |
|        |        |           | hypertension | Nena B,   |  [place], |
|        |        |           |  ([facility])       | MD  [address]    |  [place]           |
|        |        |           | Procedures   | [address]    | [address]    |
|        |        |           | ECHO         | [place], | Phone:        |
|        |        |           | Complete     |  [place] [address]    | [phone]  |
|        |        |           |              |              |  Fax:         |
|        |        |           |              |              | [phone]  |
+--------+--------+-----------+--------------+--------------+---------------+
 
 
 
 
 Encounter Details
 
 
+--------+-----------+----------------------+----------------------+--------------------+
| Date   | Type      | Department           | Care Team            | Description        |
+--------+-----------+----------------------+----------------------+--------------------+
 
| 04/15/ | Hospital  |   [facility] |   [place],        | Pulmonary          |
|    | Encounter |  [facility]  [address] | Nena GUSMAN MD        | hypertension (HCC) |
|        |           |  Evelin [place]       | [address],  |                    |
|        |           | [place] [address] | Technologist         |                    |
|        |           |   [phone]       |                      |                    |
+--------+-----------+----------------------+----------------------+--------------------+
 
 
 
 Social History
 
 
+--------------+-------+-----------+--------+------+
| Tobacco Use  | Types | Packs/Day | Years  | Date |
|              |       |           | Used   |      |
+--------------+-------+-----------+--------+------+
| Never Smoker |       |           |        |      |
+--------------+-------+-----------+--------+------+
 
 
 
+---------------------+---+---+---+
| Smokeless Tobacco:  |   |   |   |
| Never Used          |   |   |   |
+---------------------+---+---+---+
 
 
 
+---------------------------------------------------------------+
| Comments: some second hand smoke exposure, but fairly minimal |
+---------------------------------------------------------------+
 
 
 
+-------------+-------------+---------+----------+
| Alcohol Use | Drinks/Week | oz/Week | Comments |
+-------------+-------------+---------+----------+
| No          |             |         |          |
+-------------+-------------+---------+----------+
 
 
 
+------------------+---------------+
| Sex Assigned at  | Date Recorded |
| Birth            |               |
+------------------+---------------+
| Not on file      |               |
+------------------+---------------+
 
 
 
+----------------+-------------+-------------+
| Job Start Date | Occupation  | Industry    |
+----------------+-------------+-------------+
| Not on file    | Not on file | Not on file |
+----------------+-------------+-------------+
 
 
 
+----------------+--------------+------------+
 
| Travel History | Travel Start | Travel End |
+----------------+--------------+------------+
 
 
 
+-------------------------------------+
| No recent travel history available. |
+-------------------------------------+
 documented as of this encounter
 
 Medications at Time of Discharge
 
 
+----------------------+----------------------+-----------+---------+----------+-----------+
| Medication           | Sig                  | Dispensed | Refills | Start    | End Date  |
|                      |                      |           |         | Date     |           |
+----------------------+----------------------+-----------+---------+----------+-----------+
|   cholecalciferol    | Take 2,000 Units by  |           | 0       |          |           |
| (VITAMIN D-3) 2000   | mouth Every other    |           |         |          |           |
| UNITS                | day.                 |           |         |          |           |
| TABSIndications:     |                      |           |         |          |           |
| Unspecified          |                      |           |         |          |           |
| hypertensive kidney  |                      |           |         |          |           |
| disease with chronic |                      |           |         |          |           |
|  kidney disease      |                      |           |         |          |           |
| stage I through      |                      |           |         |          |           |
| stage IV, or         |                      |           |         |          |           |
| unspecified(403.90), |                      |           |         |          |           |
|  FSGS (focal         |                      |           |         |          |           |
| segmental            |                      |           |         |          |           |
| glomerulosclerosis), |                      |           |         |          |           |
|  Hyperlipidemia,     |                      |           |         |          |           |
| Complications of     |                      |           |         |          |           |
| transplanted kidney  |                      |           |         |          |           |
+----------------------+----------------------+-----------+---------+----------+-----------+
|   glucose blood VI   | Check blood sugar    |   100     | 12      | 11/26/20 |           |
| test strips (ONE     | before each meal and | each      |         | 12       |           |
| TOUCH ULTRA TEST)    |  as directed         |           |         |          |           |
| stripIndications:    |                      |           |         |          |           |
| Unspecified          |                      |           |         |          |           |
| hypertensive kidney  |                      |           |         |          |           |
| disease with chronic |                      |           |         |          |           |
|  kidney disease      |                      |           |         |          |           |
| stage I through      |                      |           |         |          |           |
| stage IV, or         |                      |           |         |          |           |
| unspecified(403.90), |                      |           |         |          |           |
|  Complications of    |                      |           |         |          |           |
| transplanted kidney, |                      |           |         |          |           |
|  Diabetes mellitus   |                      |           |         |          |           |
| type II,             |                      |           |         |          |           |
| uncontrolled (HCC),  |                      |           |         |          |           |
| Hyperlipidemia       |                      |           |         |          |           |
+----------------------+----------------------+-----------+---------+----------+-----------+
|   Respiratory        | Decrease CPAP to     |   1 each  | 0       | 20 |           |
| Therapy Supplies     | 12-18 cmH2O          |           |         | 13       |           |
| MISC                 | Diagnosis            |           |         |          |           |
|                      | Code(s)327.23.       |           |         |          |           |
|                      | Please send order to |           |         |          |           |
|                      |  [facility].     |           |         |          |           |
+----------------------+----------------------+-----------+---------+----------+-----------+
 
|   allopurinol        | Take 1 tablet by     |   30      | 11      | 02/10/20 |  |
| (ZYLOPRIM) 100 mg    | mouth Daily.         | tablet    |         | 14       | 5         |
| tablet               |                      |           |         |          |           |
+----------------------+----------------------+-----------+---------+----------+-----------+
|   aspirin 81 MG EC   | Take 81 mg by mouth  |           | 0       | 20 |  |
| tablet               | Daily.               |           |         | 12       | 5         |
+----------------------+----------------------+-----------+---------+----------+-----------+
|   cinacalcet         | Take 1 tablet by     |   30      | 12      | 20 |  |
| (SENSIPAR) 30 mg     | mouth Daily.         | tablet    |         | 13       | 4         |
| tablet               |                      |           |         |          |           |
+----------------------+----------------------+-----------+---------+----------+-----------+
|   fludrocortisone    | Take 1 tablet by     |   45      | 2       | 20 |  |
| (FLORINEF) 0.1 mg    | mouth Every other    | tablet    |         | 14       | 5         |
| tablet               | day.                 |           |         |          |           |
+----------------------+----------------------+-----------+---------+----------+-----------+
|   fluticasone        | Inhale 1 puff into   |   1       | 11      | 20 |  |
| (FLOVENT HFA) 220    | the lungs 2 times    | Inhaler   |         | 13       | 5         |
| mcg/puff inhaler     | daily. Rinse mouth   |           |         |          |           |
|                      | after use.           |           |         |          |           |
+----------------------+----------------------+-----------+---------+----------+-----------+
|   furosemide (LASIX) | Take 1 tablet by     |   30      | 5       | 20 |  |
|  40 mg tablet        | mouth Daily.         | tablet    |         | 13       | 4         |
+----------------------+----------------------+-----------+---------+----------+-----------+
|   insulin glargine   | Inject 20 Units      |   10 mL   | 0       | 20 |  |
| (LANTUS) 100         | under the skin every |           |         | 13       | 4         |
| units/mL             |  morning.            |           |         |          |           |
| injectionIndications |                      |           |         |          |           |
| : Unspecified        |                      |           |         |          |           |
| hypertensive kidney  |                      |           |         |          |           |
| disease with chronic |                      |           |         |          |           |
|  kidney disease      |                      |           |         |          |           |
| stage I through      |                      |           |         |          |           |
| stage IV, or         |                      |           |         |          |           |
| unspecified(403.90), |                      |           |         |          |           |
|  Complications of    |                      |           |         |          |           |
| transplanted kidney, |                      |           |         |          |           |
|  Diabetes mellitus   |                      |           |         |          |           |
| type II,             |                      |           |         |          |           |
| uncontrolled ([facility]),  |                      |           |         |          |           |
| Hyperlipidemia       |                      |           |         |          |           |
+----------------------+----------------------+-----------+---------+----------+-----------+
|   insulin lispro     | Inject               |   10 pen  | 5       | 20 |  |
| (HUMALOG) 100        | subcutaneously       |           |         | 13       | 4         |
| units/mL injection   | before meals         |           |         |          |           |
|                      | according to sliding |           |         |          |           |
|                      |  scale               |           |         |          |           |
+----------------------+----------------------+-----------+---------+----------+-----------+
|   Insulin            | Use before meals and |   100     | 11      | 20 |  |
| Syringe-Needle U-100 |  as directed.        | each      |         | 13       | 4         |
|  (BD INSULIN SYRINGE |                      |           |         |          |           |
|  ULTRAFINE) 31G X    |                      |           |         |          |           |
| " 0.5 ML MISC    |                      |           |         |          |           |
+----------------------+----------------------+-----------+---------+----------+-----------+
|   levothyroxine      | Take 1 tablet by     |   30      | 11      | 20 | 10/06/201 |
| (LEVOTHROID) 50 mcg  | mouth Daily.         | tablet    |         | 13       | 4         |
| tablet               |                      |           |         |          |           |
+----------------------+----------------------+-----------+---------+----------+-----------+
|   lisinopril         | Take 1 tablet by     |   90      | 3       | 20 |  |
| (PRINIVIL,ZESTRIL)   | mouth Daily.         | tablet    |         | 13       | 4         |
| 30 MG tablet         |                      |           |         |          |           |
 
+----------------------+----------------------+-----------+---------+----------+-----------+
|   loperamide         | Take 2 mg by mouth   |           | 0       | 20 |  |
| (ANTI-DIARRHEAL) 2   | Daily as needed.     |           |         | 12       | 4         |
| mg capsule           |                      |           |         |          |           |
+----------------------+----------------------+-----------+---------+----------+-----------+
|   magnesium oxide    | Take 1 tablet by     |   62      | 12      | 20 |  |
| (MAG-OX) 400 mg      | mouth 2 times daily. | tablet    |         | 13       | 4         |
| tablet               |                      |           |         |          |           |
+----------------------+----------------------+-----------+---------+----------+-----------+
|   metoprolol         | Take 1 tablet by     |   60      | 11      | 20 |  |
| tartrate (LOPRESSOR) | mouth 2 times daily. | tablet    |         | 13       | 4         |
|  25 mg tablet        |                      |           |         |          |           |
+----------------------+----------------------+-----------+---------+----------+-----------+
|   mycophenolate      | Take 250 mg by mouth |           | 0       | 20 | 10/14/201 |
| (CELLCEPT) 250 mg    |  3 times daily.      |           |         | 11       | 5         |
| capsule              |                      |           |         |          |           |
+----------------------+----------------------+-----------+---------+----------+-----------+
|   omeprazole         | Take one capsule by  |   90      | 3       | 20 |  |
| (PRILOSEC) 20 mg     | mouth once daily on  | capsule   |         | 13       | 4         |
| capsule              | an empty stomach     |           |         |          |           |
+----------------------+----------------------+-----------+---------+----------+-----------+
|   predniSONE         | Take 1 tablet by     |   90      | 3       | 20 |  |
| (DELTASONE) 5 mg     | mouth Daily.         | tablet    |         | 14       | 5         |
| tablet               |                      |           |         |          |           |
+----------------------+----------------------+-----------+---------+----------+-----------+
|   Prenatal           | Take 0.8 mg by mouth |   30 each | 11      | 20 |  |
| Multivit-Min-Fe-FA   |  Daily.              |           |         | 13       | 4         |
| (PRENATAL VITAMINS)  |                      |           |         |          |           |
| 0.8 MG TABS          |                      |           |         |          |           |
+----------------------+----------------------+-----------+---------+----------+-----------+
|   Prenatal Vit-Fe    |                      |           | 0       | 20 |  |
| Fumarate-FA (PNV     |                      |           |         | 13       | 4         |
| PRENATAL PLUS        |                      |           |         |          |           |
| MULTIVITAMIN) 27-1   |                      |           |         |          |           |
| MG TABS              |                      |           |         |          |           |
+----------------------+----------------------+-----------+---------+----------+-----------+
|   rosuvastatin       | Take 1 tablet by     |   30      | 11      | 20 |  |
| (CRESTOR) 20 mg      | mouth nightly.       | tablet    |         | 13       | 4         |
| tablet               |                      |           |         |          |           |
+----------------------+----------------------+-----------+---------+----------+-----------+
|   tacrolimus         | TAD.                 |           | 0       | 20 | 07/15/201 |
| (PROGRAF) 0.5 mg     |                      |           |         | 12       | 4         |
| capsuleIndications:  |                      |           |         |          |           |
| Unspecified          |                      |           |         |          |           |
| hypertensive kidney  |                      |           |         |          |           |
| disease with chronic |                      |           |         |          |           |
|  kidney disease      |                      |           |         |          |           |
| stage I through      |                      |           |         |          |           |
| stage IV, or         |                      |           |         |          |           |
| unspecified(403.90), |                      |           |         |          |           |
|  Complications of    |                      |           |         |          |           |
| transplanted kidney, |                      |           |         |          |           |
|  Diabetes mellitus   |                      |           |         |          |           |
| type II,             |                      |           |         |          |           |
| uncontrolled (HCC),  |                      |           |         |          |           |
| Hyperlipidemia       |                      |           |         |          |           |
+----------------------+----------------------+-----------+---------+----------+-----------+
|   tacrolimus         | Take 1.5 mg by mouth |           | 0       | 20 |  |
| (PROGRAF) 1 mg       |  2 times daily.      |           |         | 13       | 4         |
| capsuleIndications:  |                      |           |         |          |           |
 
| Unspecified          |                      |           |         |          |           |
| hypertensive kidney  |                      |           |         |          |           |
| disease with chronic |                      |           |         |          |           |
|  kidney disease      |                      |           |         |          |           |
| stage I through      |                      |           |         |          |           |
| stage IV, or         |                      |           |         |          |           |
| unspecified(403.90), |                      |           |         |          |           |
|  FSGS (focal         |                      |           |         |          |           |
| segmental            |                      |           |         |          |           |
| glomerulosclerosis), |                      |           |         |          |           |
|  Hyperlipidemia,     |                      |           |         |          |           |
| Complications of     |                      |           |         |          |           |
| transplanted kidney  |                      |           |         |          |           |
+----------------------+----------------------+-----------+---------+----------+-----------+
|   valsartan (DIOVAN) | Take 1 tablet by     |   30      | 11      | 20 |  |
|  160 mg tablet       | mouth Daily.         | tablet    |         | 14       | 4         |
+----------------------+----------------------+-----------+---------+----------+-----------+
 documented as of this encounter
 
 Plan of Treatment
 
 
+--------+-----------+------------+----------------------+-------------------+
| Date   | Type      | Specialty  | Care Team            | Description       |
+--------+-----------+------------+----------------------+-------------------+
| / | Office    | Cardiology |   Tonia Wilson,    |                   |
| 2020   | Visit     |            | ARNP  [address]   |                   |
|        |           |            | St  [place], [place]  |                   |
|        |           |            | [address]  [phone]  |                   |
|        |           |            |  [phone] (Fax)  |                   |
+--------+-----------+------------+----------------------+-------------------+
| / | Hospital  | Radiology  |   Popeye Townsend, |                   |
|    | Encounter |            |  MD  [address] |                   |
|        |           |            |  [place],   |                   |
|        |           |            | [place] [address]             |                   |
|        |           |            | [phone]         |                   |
|        |           |            | [phone] (Fax)   |                   |
+--------+-----------+------------+----------------------+-------------------+
| / | Surgery   | Radiology  |   Popeye Townsend, | CV EP PPM SYSTEM  |
|    |           |            |  MD  [address] | IMPLANT           |
|        |           |            |  St.  [place],   |                   |
|        |           |            | [place] [address]             |                   |
|        |           |            | [phone]         |                   |
|        |           |            | [phone] (Fax)   |                   |
+--------+-----------+------------+----------------------+-------------------+
| 02/10/ | Clinical  | Cardiology |                      |                   |
|    | Support   |            |                      |                   |
+--------+-----------+------------+----------------------+-------------------+
| / | Office    | Cardiology |   Tonia Wilson,    |                   |
|    | Visit     |            | [facility]  [address]   |                   |
|        |           |            | [place]  |                   |
|        |           |            | [address]  [phone]  |                   |
|        |           |            |  [phone] (Fax)  |                   |
+--------+-----------+------------+----------------------+-------------------+
| / | Off-Site  | Nephrology |   Marzena Moser  |                   |
|    | Visit     |            | DO [facility]  [address] [place] [address]      |                   |
|        |           |            | [address]      |                   |
|        |           |            | [place]      |                   |
|        |           |            | [address]  [phone]  |                   |
|        |           |            |  [phone] (Fax)  |                   |
 
+--------+-----------+------------+----------------------+-------------------+
 documented as of this encounter
 
 Procedures
 
 
+----------------+--------+-------------+----------------------+----------------------+
| Procedure Name | Priori | Date/Time   | Associated Diagnosis | Comments             |
|                | ty     |             |                      |                      |
+----------------+--------+-------------+----------------------+----------------------+
| ECHO COMPLETE  | Routin | 04/15/2014  |   Pulmonary          |   Results for this   |
|                | e      |  3:55 PM    | hypertension (HCC)   | procedure are in the |
|                |        | PDT         |                      |  results section.    |
+----------------+--------+-------------+----------------------+----------------------+
 documented in this encounter
 
 Results
 ECHO Complete (04/15/2014  3:55 PM PDT)
 
+----------+
| Specimen |
+----------+
|          |
+----------+
 
 
 
+------------------------------------------------------------------------+-----------------+
| Narrative                                                              | Performed At    |
+------------------------------------------------------------------------+-----------------+
|   [facility]     ECHOCARDIOGRAM REPORT         |   [facility]    |
| STUDY DATE:   4/15/2014        PATIENT NAME: Viviana Ricci  :      | [facility]        |
| 1945  MRN: [id number]  PCP: Marzena Moser, DO              | [facility]  |
| CLINICAL HISTORY/DIAGNOSIS:   PULM HTN     A transthoracic             | - IMAGING       |
| echocardiogram with M-mode, pulsed-wave and color Doppler   was        |                 |
| performed with standard views obtained.   The technical quality of     |                 |
| this   examination is adequate.   The heart rhythm during the echo is  |                 |
| sinus   rhythm.   The M-mode, two-dimensional, color flow and spectral |                 |
|  Doppler data   were reviewed and support the following                |                 |
| interpretation:     Interpretation:  Left Atrium: Mildly dilated  Left |                 |
|  ventricle:   Left ventricular size is normal with normal wall         |                 |
| thickness and motion, and normal left ventricular systolic function.   |                 |
|   The   estimated ejection fraction is 70-75 %.   Grade 1 left         |                 |
| ventricular   diastolic dysfunction.   Aortic root: Aortic root is     |                 |
| normal.  Right Atrium:   Right atrial sizes normal.   Normal           |                 |
| right-sided pressure.  Right ventricle:   Right ventricular size is    |                 |
| normal with normal wall   thickness and normal right ventricular       |                 |
| systolic function.  Pericardium:   Pericardium is normal.  Pulmonary   |                 |
| artery:   Pulmonary artery is normal. normal right-sided pressure      |                 |
| Aortic valve:   Aortic valve is trileaflet with mild thickening and    |                 |
| opens   normally.   Mild aortic valve insufficiency  Mitral valve:     |                 |
|   Mitral valve is normal. mild mitral valve regurgitation  Pulmonic    |                 |
| valve:   Pulmonic valve is normal.  Tricuspid valve:   Tricuspid valve |                 |
|  is normal. mild tricuspid valve   regurgitation  Vena cava: Not seen  |                 |
|        IMPRESSIONS:  1.   Mild left atrial dilatation.  2.   Normal    |                 |
| left ventricular size, wall thickness and motion.   Preserved   left   |                 |
| ventricular systolic function.   LVEF is 70-75%.  3.   Grade 1 left    |                 |
| ventricular diastole dysfunction.  4.   Mild tricuspid valve           |                 |
| regurgitation.  5.   Mild thickened trileaflet aortic valve with       |                 |
| adequate opening.   A mild   aortic valve insufficiency.  6.   Normal  |                 |
 
| right-sided pressure.              Measurements:  Height: 66  Weight:  |                 |
| 277  Aortic root:   37 mm  Aortic cusp sep:   18 mm  LA:   48 mm       |                 |
| IVS-diastole:   11 mm  IVS-systole:   20 mm  LVPW diastole:   11 mm    |                 |
| LVPW systole:   18 mm  LV diameter-diastole:   52 mm  LV               |                 |
| diameter-systole:   28 mm  Fractional shortenin %  PFV aortic   |                 |
| valve:    m/s  MPG mitral valve:    mmHg  PFV TR jet:   2.25 m/s       |                 |
| RA/RV PP mmHg  LA volume:   50 mL  LA index:   22 mL/m2  Mitral |                 |
|  Inflow DT:   290 ms  IVRT:   110 ms  Valsalva:   NOT NEEDED  PWDTI S  |                 |
| wave:   6.7 cm/s  PWDTI E wave:   5.6 cm/s  PWDTI A wave:   5.0 cm/s   |                 |
| E/A Ratio:   1.120  E/E Ratio:   9.45              Signed by:          |                 |
| Popeye Townsend MD [facility]     4/15/2014   16:02           Sonographer: |                 |
|    Jn Trinh, RDCS, RVT, RDMS                                    |                 |
+------------------------------------------------------------------------+-----------------+
 
 
 
+-------------------------------------------------------------------------------------------
----------------------------------+
| Procedure Note                                                                            
                                  |
+-------------------------------------------------------------------------------------------
----------------------------------+
|   Popeye Townsend MD - 04/15/2014  4:20 PM [facility]            
                                  |
| CENTERECHOCARDIOGRAM REPORTSTUDY DATE:  4/15/2014PATIENT NAME: Viviana Hernandez:         
                                  |
| 1945MRN: [id number]: Marzena Moser, DOCLINICAL HISTORY/DIAGNOSIS:  PULM    
                                  |
| HTNA transthoracic echocardiogram with M-mode, pulsed-wave and color Doppler was          
                                  |
| performed with standard views obtained.  The technical quality of this examination is     
                                  |
| adequate.  The heart rhythm during the echo is sinus rhythm.  The M-mode,                 
                                  |
| two-dimensional, color flow and spectral Doppler data were reviewed and support the       
                                  |
| following interpretation:Interpretation:Left Atrium: Mildly dilatedLeft ventricle:  Left  
                                  |
|  ventricular size is normal with normal wall thickness and motion, and normal left        
                                  |
| ventricular systolic function.  The estimated ejection fraction is 70-75 %.  Grade 1      
                                  |
| left ventricular diastolic dysfunction. Aortic root: Aortic root is normal.Right Atrium:  
                                  |
|   Right atrial sizes normal.  Normal right-sided pressure.Right ventricle:  Right         
                                  |
| ventricular size is normal with normal wall thickness and normal right ventricular        
                                  |
| systolic function.Pericardium:  Pericardium is normal.Pulmonary artery:  Pulmonary        
                                  |
| artery is normal. normal right-sided pressureAortic valve:  Aortic valve is trileaflet    
                                  |
| with mild thickening and opens normally.  Mild aortic valve insufficiencyMitral valve:    
                                  |
| Mitral valve is normal. mild mitral valve regurgitationPulmonic valve:  Pulmonic valve    
                                  |
| is normal.Tricuspid valve:  Tricuspid valve is normal. mild tricuspid valve               
                                  |
| regurgitationVena cava: Not seenIMPRESSIONS:1.  Mild left atrial dilatation.2.  Normal    
                                  |
 
| left ventricular size, wall thickness and motion.  Preserved left ventricular systolic    
                                  |
| function.  LVEF is 70-75%.3.  Grade 1 left ventricular diastole dysfunction.4.  Mild      
                                  |
| tricuspid valve regurgitation.5.  Mild thickened trileaflet aortic valve with adequate    
                                  |
| opening.  A mild aortic valve insufficiency.6.  Normal right-sided                        
                                  |
| pressure.Measurements:Height: 66Weight: 277Aortic root:  37 mmAortic cusp sep:  18 mmLA:  
                                  |
|   48 mmIVS-diastole:  11 mmIVS-systole:  20 mmLVPW diastole:  11 mmLVPW systole:  18      
                                  |
| mmLV diameter-diastole:  52 mmLV diameter-systole:  28 mmFractional shortenin %PFV  
                                  |
|  aortic valve:   m/sMPG mitral valve:   mmHgPFV TR jet:  2.25 m/Brooke/RV PP mmHgLA    
                                  |
| volume:  50 mLLA index:  22 mL/e6Nlwemk Inflow DT:  290 msIVRT:  110 msValsalva:  NOT     
                                  |
| NEEDEDPWDTI S wave:  6.7 cm/sPWDTI E wave:  5.6 cm/sPWDTI A wave:  5.0 cm/sE/A Ratio:     
                                  |
| 1.120E/E Ratio:  9.45Signed by:   Popeye Townsend MD [facility]  4/15/2014  16:02             
                                  |
| Sonographer:  Jn Trinh, PHU, RVT, RDMS                                            
                                  |
|IMPRESSIONS:                                                                               
                                 |
|1.  Mild left atrial dilatation.                                                           
                                  |
|2.  Normal left ventricular size, wall thickness and motion.  Preserved left ventricular sy
stolic function.  LVEF is 70-75%. |
|3.  Grade 1 left ventricular diastole dysfunction.                                         
                                  |
|4.  Mild tricuspid valve regurgitation.                                                    
                                  |
|5.  Mild thickened trileaflet aortic valve with adequate opening.  A mild aortic valve insu
fficiency.                        |
|6.  Normal right-sided pressure.                                                           
                                  |
|Measurements:                                                                              
                                 |
|Height: 66                                                                                 
                                  |
|Weight: 277                                                                                
                                  |
|Aortic root:  37 mm                                                                        
                                  |
|Aortic cusp sep:  18 mm                                                                    
                                  |
 
|LA:  48 mm                                                                                 
                                  |
|IVS-diastole:  11 mm                                                                       
                                  |
|IVS-systole:  20 mm                                                                        
                                  |
|LVPW diastole:  11 mm                                                                      
                                  |
|LVPW systole:  18 mm                                                                       
                                  |
|LV diameter-diastole:  52 mm                                                               
                                  |
|LV diameter-systole:  28 mm                                                                
                                  |
|Fractional shortenin %                                                               
                                  |
|PFV aortic valve:   m/s                                                                    
                                  |
|MPG mitral valve:   mmHg                                                                   
                                  |
|PFV TR jet:  2.25 m/s                                                                      
                                  |
|RA/RV PP mmHg                                                                        
                                  |
|LA volume:  50 mL                                                                          
                                  |
|LA index:  22 mL/m2                                                                        
                                  |
|Mitral Inflow DT:  290 ms                                                                  
                                  |
|IVRT:  110 ms                                                                              
                                  |
|Valsalva:  NOT NEEDED                                                                      
                                  |
|PWDTI S wave:  6.7 cm/s                                                                    
                                  |
|PWDTI E wave:  5.6 cm/s                                                                    
                                  |
|PWDTI A wave:  5.0 cm/s                                                                    
                                  |
|E/A Ratio:  1.120                                                                          
                                  |
|E/E Ratio:  9.45                                                                           
                                  |
|Signed by:   Popeye Townsend MD [facility]                                                     
                                  |
|  4/15/2014  16:02                                                                         
                                  |
 
|Sonographer:  Jn Trinh, HILDACS, RVT, RDMS                                             
                                  |
+-------------------------------------------------------------------------------------------
----------------------------------+
 
 
 
+----------------------+---------------------+--------------------+----------------+
| Performing           | Address             | City/State/Zipcode | Phone Number   |
| Organization         |                     |                    |                |
+----------------------+---------------------+--------------------+----------------+
|   [facility]     |   [address] | [place]    |   [phone] |
| [facility]  |                     | [address]              |                |
| - IMAGING            |                     |                    |                |
+----------------------+---------------------+--------------------+----------------+
 documented in this encounter
 
 Visit Diagnoses
 
 
+------------------------------------------------------------------------+
| Diagnosis                                                              |
+------------------------------------------------------------------------+
|   Pulmonary hypertension (HCC)  Other chronic pulmonary heart diseases |
+------------------------------------------------------------------------+
 documented in this encounter

## 2020-01-08 NOTE — XMS
Encounter Summary
  Created on: 2020
 
 Viviana Ricci
 External Reference #: [id number]
 : 47
 Sex: Female
 
 Demographics
 
 
+-----------------------+----------------------+
| Address               | [address]      |
|                       | [place]  [address] |
+-----------------------+----------------------+
| Home Phone            | [phone]      |
+-----------------------+----------------------+
| Preferred Language    | Unknown              |
+-----------------------+----------------------+
| Marital Status        | Single               |
+-----------------------+----------------------+
| Evangelical Affiliation | [id number]                 |
+-----------------------+----------------------+
| Race                  | Unknown              |
+-----------------------+----------------------+
| Ethnic Group          | Unknown              |
+-----------------------+----------------------+
 
 
 Author
 
 
+--------------+--------------------------------------------+
| Author       | [facility]  |
|              | and [facility]                                |
+--------------+--------------------------------------------+
| Organization | [facility]  |
|              | and [facility]                                |
+--------------+--------------------------------------------+
| Address      | Unknown                                    |
+--------------+--------------------------------------------+
| Phone        | Unavailable                                |
+--------------+--------------------------------------------+
 
 
 
 Support
 
 
+----------------+--------------+---------------------+-----------------+
| Name           | Relationship | Address             | Phone           |
+----------------+--------------+---------------------+-----------------+
| Ada/Ed Radhames | ECON         | [place]              | [phone] |
|                |              | [place]  |                 |
|                |              |  [address]              |                 |
+----------------+--------------+---------------------+-----------------+
 
 
 
 
 Care Team Providers
 
 
+-----------------------+------+-------------+
| Care Team Member Name | Role | Phone       |
+-----------------------+------+-------------+
 PCP  | Unavailable |
+-----------------------+------+-------------+
 
 
 
 Reason for Visit
 
 
+-------------------+----------+
| Reason            | Comments |
+-------------------+----------+
| Medication Refill |          |
+-------------------+----------+
 
 
 
 Encounter Details
 
 
+--------+--------+---------------------+----------------------+-------------------+
| Date   | Type   | Department          | Care Team            | Description       |
+--------+--------+---------------------+----------------------+-------------------+
| 10/27/ | Refill |   [facility]         |   Marzena Moser  | Medication Refill |
| 2017   |        | NEPHROLOGY  [address]   | M, DO  [address]      |                   |
|        |        | [address]   | [address]      |                   |
|        |        | [place], [place]     | [place], [place]      |                   |
|        |        | [address]          | [address]  [phone]  |                   |
|        |        | [phone]        |  [phone] (Fax)  |                   |
+--------+--------+---------------------+----------------------+-------------------+
 
 
 
 Social History
 
 
+--------------+-------+-----------+--------+------+
| Tobacco Use  | Types | Packs/Day | Years  | Date |
|              |       |           | Used   |      |
+--------------+-------+-----------+--------+------+
| Never Smoker |       |           |        |      |
+--------------+-------+-----------+--------+------+
 
 
 
+---------------------+---+---+---+
| Smokeless Tobacco:  |   |   |   |
| Never Used          |   |   |   |
+---------------------+---+---+---+
 
 
 
+---------------------------------------------------------------+
| Comments: some second hand smoke exposure, but fairly minimal |
 
+---------------------------------------------------------------+
 
 
 
+-------------+-------------+---------+----------+
| Alcohol Use | Drinks/Week | oz/Week | Comments |
+-------------+-------------+---------+----------+
| No          |             |         |          |
+-------------+-------------+---------+----------+
 
 
 
+------------------+---------------+
| Sex Assigned at  | Date Recorded |
| Birth            |               |
+------------------+---------------+
| Not on file      |               |
+------------------+---------------+
 
 
 
+----------------+-------------+-------------+
| Job Start Date | Occupation  | Industry    |
+----------------+-------------+-------------+
| Not on file    | Not on file | Not on file |
+----------------+-------------+-------------+
 
 
 
+----------------+--------------+------------+
| Travel History | Travel Start | Travel End |
+----------------+--------------+------------+
 
 
 
+-------------------------------------+
| No recent travel history available. |
+-------------------------------------+
 documented as of this encounter
 
 Plan of Treatment
 
 
+--------+-----------+------------+----------------------+-------------------+
| Date   | Type      | Specialty  | Care Team            | Description       |
+--------+-----------+------------+----------------------+-------------------+
| / | Office    | Cardiology |   HellalfredoTonia,    |                   |
|    | Visit     |            | ARNP  [address]   |                   |
|        |           |            | St  [place], [place]  |                   |
|        |           |            | [address]  [phone]  |                   |
|        |           |            |  [phone] (Fax)  |                   |
+--------+-----------+------------+----------------------+-------------------+
| / | Hospital  | Radiology  |   Popeye Townsend, |                   |
|    | Encounter |            |  MD  [address] |                   |
|        |           |            |  St.  [place],   |                   |
|        |           |            | [place] [address]             |                   |
|        |           |            | [phone]         |                   |
|        |           |            | [phone] (Fax)   |                   |
+--------+-----------+------------+----------------------+-------------------+
| / | Surgery   | Radiology  |   Popeye Townsend, | CV EP PPM SYSTEM  |
 
|    |           |            |  MD  [address] | IMPLANT           |
|        |           |            |  St.  [place],   |                   |
|        |           |            | [place] [address]             |                   |
|        |           |            | [phone]         |                   |
|        |           |            | [phone] (Fax)   |                   |
+--------+-----------+------------+----------------------+-------------------+
| 02/10/ | Clinical  | Cardiology |                      |                   |
|    | Support   |            |                      |                   |
+--------+-----------+------------+----------------------+-------------------+
| / | Office    | Cardiology |   Tonia Wilson,    |                   |
|    | Visit     |            | ARNP  [address]   |                   |
|        |           |            | [place]  |                   |
|        |           |            | [address]  [phone]  |                   |
|        |           |            |  [phone] (Fax)  |                   |
+--------+-----------+------------+----------------------+-------------------+
| / | Off-Site  | Nephrology |   Marzena Moser  |                   |
|    | Visit     |            | DO ETIENNE  [address] [place] [address]      |                   |
|        |           |            | [address]      |                   |
|        |           |            | [place]      |                   |
|        |           |            | [address]  [phone]  |                   |
|        |           |            |  [phone] (Fax)  |                   |
+--------+-----------+------------+----------------------+-------------------+
 documented as of this encounter
 
 Visit Diagnoses
 Not on filedocumented in this encounter

## 2020-01-08 NOTE — XMS
Encounter Summary
  Created on: 2020
 
 Viviana Ricci
 External Reference #: [id number]
 : 47
 Sex: Female
 
 Demographics
 
 
+-----------------------+----------------------+
| Address               | [address]      |
|                       | [place]  [address] |
+-----------------------+----------------------+
| Home Phone            | [phone]      |
+-----------------------+----------------------+
| Preferred Language    | Unknown              |
+-----------------------+----------------------+
| Marital Status        | Single               |
+-----------------------+----------------------+
| [employer] Affiliation | [id number]                 |
+-----------------------+----------------------+
| Race                  | Unknown              |
+-----------------------+----------------------+
| Ethnic Group          | Unknown              |
+-----------------------+----------------------+
 
 
 Author
 
 
+--------------+--------------------------------------------+
| Author       | [facility]  |
|              | and [facility]                                |
+--------------+--------------------------------------------+
| Organization | [facility]  |
|              | and [facility]                                |
+--------------+--------------------------------------------+
| Address      | Unknown                                    |
+--------------+--------------------------------------------+
| Phone        | Unavailable                                |
+--------------+--------------------------------------------+
 
 
 
 Support
 
 
+----------------+--------------+---------------------+-----------------+
| Name           | Relationship | Address             | Phone           |
+----------------+--------------+---------------------+-----------------+
| Ada/Ed Radhames | ECON         | [address]              | [phone] |
|                |              | [place]  |                 |
|                |              |  [address]              |                 |
+----------------+--------------+---------------------+-----------------+
 
 
 
 
 Care Team Providers
 
 
+-----------------------+------+-------------+
| Care Team Member Name | Role | Phone       |
+-----------------------+------+-------------+
 PCP  | Unavailable |
+-----------------------+------+-------------+
 
 
 
 Encounter Details
 
 
+--------+----------+---------------------+----------------------+-------------+
| Date   | Type     | Department          | Care Team            | Description |
+--------+----------+---------------------+----------------------+-------------+
| / | Abstract |   [facility] [place]         |   Marzena Moser  |             |
|    |          | NEPHROLOGY  [address]   | M, DO  [address]      |             |
|        |          | [address]   | [address]      |             |
|        |          | [place], [place]     | [place]      |             |
|        |          | [address]          | [address]  [phone]  |             |
|        |          | [phone]        |  [phone] (Fax)  |             |
+--------+----------+---------------------+----------------------+-------------+
 
 
 
 Social History
 
 
+--------------+-------+-----------+--------+------+
| Tobacco Use  | Types | Packs/Day | Years  | Date |
|              |       |           | Used   |      |
+--------------+-------+-----------+--------+------+
| Never Smoker |       |           |        |      |
+--------------+-------+-----------+--------+------+
 
 
 
+---------------------+---+---+---+
| Smokeless Tobacco:  |   |   |   |
| Never Used          |   |   |   |
+---------------------+---+---+---+
 
 
 
+---------------------------------------------------------------+
| Comments: some second hand smoke exposure, but fairly minimal |
+---------------------------------------------------------------+
 
 
 
+-------------+-------------+---------+----------+
| Alcohol Use | Drinks/Week | oz/Week | Comments |
+-------------+-------------+---------+----------+
| No          |             |         |          |
+-------------+-------------+---------+----------+
 
 
 
 
+------------------+---------------+
| Sex Assigned at  | Date Recorded |
| Birth            |               |
+------------------+---------------+
| Not on file      |               |
+------------------+---------------+
 
 
 
+----------------+-------------+-------------+
| Job Start Date | Occupation  | Industry    |
+----------------+-------------+-------------+
| Not on file    | Not on file | Not on file |
+----------------+-------------+-------------+
 
 
 
+----------------+--------------+------------+
| Travel History | Travel Start | Travel End |
+----------------+--------------+------------+
 
 
 
+-------------------------------------+
| No recent travel history available. |
+-------------------------------------+
 documented as of this encounter
 
 Progress Maite Perez - 2016  4:37 PM PSTOutside record:  Imaging of rt shoulder/rt humeru
s from [facility], dos 16.  Sent to scan.Electronically signed by Maite Raygoza at 2016  4:38 PM PSTdocumented in this encounter
 
 Plan of Treatment
 
 
+--------+-----------+------------+----------------------+-------------------+
| Date   | Type      | Specialty  | Care Team            | Description       |
+--------+-----------+------------+----------------------+-------------------+
| / | Office    | Cardiology |   Tonia Wilson,    |                   |
|    | Visit     |            | ARNJESSICA  [address]   |                   |
|        |           |            | [facility] [place]  |                   |
|        |           |            | [address]  [phone]  |                   |
|        |           |            |  [phone] (Fax)  |                   |
+--------+-----------+------------+----------------------+-------------------+
| / | Hospital  | Radiology  |   Popeye Townsend, |                   |
|    | Encounter |            |  MD  [address] |                   |
|        |           |            |  St.  [place],   |                   |
|        |           |            | [place] [address]             |                   |
|        |           |            | [phone]         |                   |
|        |           |            | [phone] (Fax)   |                   |
+--------+-----------+------------+----------------------+-------------------+
| / | Surgery   | Radiology  |   Popeye Townsend, | CV EP PPM SYSTEM  |
|    |           |            |  MD  [address] | IMPLANT           |
|        |           |            |  St.  [place],   |                   |
|        |           |            | [place] [address]             |                   |
|        |           |            | [phone]         |                   |
|        |           |            | [phone] (Fax)   |                   |
+--------+-----------+------------+----------------------+-------------------+
 
| 02/10/ | Clinical  | Cardiology |                      |                   |
2020   | Support   |            |                      |                   |
+--------+-----------+------------+----------------------+-------------------+
| / | Office    | Cardiology |   Tonia Wilson,    |                   |
|    | Visit     |            | BELKIS  [address]   |                   |
|        |           |            | [place]  |                   |
|        |           |            | [address]  [phone]  |                   |
|        |           |            |  [phone] (Fax)  |                   |
+--------+-----------+------------+----------------------+-------------------+
| / | Off-Site  | Nephrology |   Marzena Moser  |                   |
|    | Visit     |            | DO ETIENNE  [address] [place] [address]      |                   |
|        |           |            | [address]      |                   |
|        |           |            | [place]      |                   |
|        |           |            | [address]  [phone]  |                   |
|        |           |            |  [phone] (Fax)  |                   |
+--------+-----------+------------+----------------------+-------------------+
 documented as of this encounter
 
 Visit Diagnoses
 Not on filedocumented in this encounter

## 2020-01-08 NOTE — XMS
Encounter Summary
  Created on: 2020
 
 Viviana Ricci
 External Reference #: [id number]
 : 47
 Sex: Female
 
 Demographics
 
 
+-----------------------+----------------------+
| Address               | [address]      |
|                       | [place]  [address] |
+-----------------------+----------------------+
| Home Phone            | [phone]      |
+-----------------------+----------------------+
| Preferred Language    | Unknown              |
+-----------------------+----------------------+
| Marital Status        | Single               |
+-----------------------+----------------------+
| Worship Affiliation | [id number]                 |
+-----------------------+----------------------+
| Race                  | Unknown              |
+-----------------------+----------------------+
| Ethnic Group          | Unknown              |
+-----------------------+----------------------+
 
 
 Author
 
 
+--------------+--------------------------------------------+
| Author       | [facility]  |
|              | and [place]                                |
+--------------+--------------------------------------------+
| Organization | [facility]  |
|              | and [place]                                |
+--------------+--------------------------------------------+
| Address      | Unknown                                    |
+--------------+--------------------------------------------+
| Phone        | Unavailable                                |
+--------------+--------------------------------------------+
 
 
 
 Support
 
 
+----------------+--------------+---------------------+-----------------+
| Name           | Relationship | Address             | Phone           |
+----------------+--------------+---------------------+-----------------+
| Ada/Ed Radhames | ECON         | [address]              | [phone] |
|                |              | [place]  |                 |
|                |              |  [address]              |                 |
+----------------+--------------+---------------------+-----------------+
 
 
 
 
 Care Team Providers
 
 
+-----------------------+------+-------------+
| Care Team Member Name | Role | Phone       |
+-----------------------+------+-------------+
 PCP  | Unavailable |
+-----------------------+------+-------------+
 
 
 
 Encounter Details
 
 
+--------+----------+---------------------+----------------------+-------------+
| Date   | Type     | Department          | Care Team            | Description |
+--------+----------+---------------------+----------------------+-------------+
| / | Abstract |   [facility]         |   Marzena Moser  |             |
|    |          | NEPHROLOGY  [address]   | M, DO  [address]      |             |
|        |          | [address]   | [address]      |             |
|        |          | [place], [place]     | [place]      |             |
|        |          | [address]          | [address]  [phone]  |             |
|        |          | [phone]        |  [phone] (Fax)  |             |
+--------+----------+---------------------+----------------------+-------------+
 
 
 
 Social History
 
 
+--------------+-------+-----------+--------+------+
| Tobacco Use  | Types | Packs/Day | Years  | Date |
|              |       |           | Used   |      |
+--------------+-------+-----------+--------+------+
| Never Smoker |       |           |        |      |
+--------------+-------+-----------+--------+------+
 
 
 
+---------------------+---+---+---+
| Smokeless Tobacco:  |   |   |   |
| Never Used          |   |   |   |
+---------------------+---+---+---+
 
 
 
+---------------------------------------------------------------+
| Comments: some second hand smoke exposure, but fairly minimal |
+---------------------------------------------------------------+
 
 
 
+-------------+-------------+---------+----------+
| Alcohol Use | Drinks/Week | oz/Week | Comments |
+-------------+-------------+---------+----------+
| No          |             |         |          |
+-------------+-------------+---------+----------+
 
 
 
 
+------------------+---------------+
| Sex Assigned at  | Date Recorded |
| Birth            |               |
+------------------+---------------+
| Not on file      |               |
+------------------+---------------+
 
 
 
+----------------+-------------+-------------+
| Job Start Date | Occupation  | Industry    |
+----------------+-------------+-------------+
| Not on file    | Not on file | Not on file |
+----------------+-------------+-------------+
 
 
 
+----------------+--------------+------------+
| Travel History | Travel Start | Travel End |
+----------------+--------------+------------+
 
 
 
+-------------------------------------+
| No recent travel history available. |
+-------------------------------------+
 documented as of this encounter
 
 Plan of Treatment
 
 
+--------+-----------+------------+----------------------+-------------------+
| Date   | Type      | Specialty  | Care Team            | Description       |
+--------+-----------+------------+----------------------+-------------------+
| / | Office    | Cardiology |   Tonia Wilson,    |                   |
|    | Visit     |            | ARNJESSICA  [address]   |                   |
|        |           |            | [place]  |                   |
|        |           |            | [address]  [phone]  |                   |
|        |           |            |  [phone] (Fax)  |                   |
+--------+-----------+------------+----------------------+-------------------+
| / | Hospital  | Radiology  |   Popeye Townsend, |                   |
|    | Encounter |            |  MD  [address] |                   |
|        |           |            |  St.  [place],   |                   |
|        |           |            | [place] [address]             |                   |
|        |           |            | [phone]         |                   |
|        |           |            | [phone] (Fax)   |                   |
+--------+-----------+------------+----------------------+-------------------+
| / | Surgery   | Radiology  |   Popeye Townsend, | CV EP PPM SYSTEM  |
|    |           |            |  MD  [address] | IMPLANT           |
|        |           |            |  St.  [place],   |                   |
|        |           |            | [place] [address]             |                   |
|        |           |            | [phone]         |                   |
|        |           |            | [phone] (Fax)   |                   |
+--------+-----------+------------+----------------------+-------------------+
| 02/10/ | Clinical  | Cardiology |                      |                   |
|    | Support   |            |                      |                   |
+--------+-----------+------------+----------------------+-------------------+
| / | Office    | Cardiology |   Tonia Wilson,    |                   |
|    | Visit     |            | ARNP  [address]   |                   |
 
|        |           |            | St  [place], [place]  |                   |
|        |           |            | [address]  [phone]  |                   |
|        |           |            |  [phone] (Fax)  |                   |
+--------+-----------+------------+----------------------+-------------------+
| / | Off-Site  | Nephrology |   Marzena Moser  |                   |
| 2020   | Visit     |            | DO ETIENNE  [address] [place] [address]      |                   |
|        |           |            | [address]      |                   |
|        |           |            | [place]      |                   |
|        |           |            | [address]  [phone]  |                   |
|        |           |            |  [phone] (Fax)  |                   |
+--------+-----------+------------+----------------------+-------------------+
 documented as of this encounter
 
 Procedures
 
 
+----------------------+--------+------------+----------------------+----------------------+
| Procedure Name       | Priori | Date/Time  | Associated Diagnosis | Comments             |
|                      | ty     |            |                      |                      |
+----------------------+--------+------------+----------------------+----------------------+
| EXTERNAL LAB:        | Routin | 2015 |                      |   Results for this   |
| URINALYSIS           | e      |            |                      | procedure are in the |
|                      |        |            |                      |  results section.    |
+----------------------+--------+------------+----------------------+----------------------+
| URINALYSIS W/CULTURE | Routin | 2015 |                      |   Results for this   |
|  IF INDICATED        | e      |            |                      | procedure are in the |
|                      |        |            |                      |  results section.    |
+----------------------+--------+------------+----------------------+----------------------+
 documented in this encounter
 
 Results
 Urinalysis w/Culture if Indicated (2015)
 
+-----------+------------+------------+------------+--------------+
| Component | Value      | Ref Range  | Performed  | Pathologist  |
|           |            |            | At         | Signature    |
+-----------+------------+------------+------------+--------------+
| WBC UA    |  (A) | 0 - 2 /HPF | EXTERNAL   |              |
|           |            |            | LAB        |              |
+-----------+------------+------------+------------+--------------+
 
 
 
+-----------------+
| Specimen        |
+-----------------+
| Urine specimen  |
| (specimen)      |
+-----------------+
 
 
 
+----------------+---------+--------------------+--------------+
| Performing     | Address | City/State/Zipcode | Phone Number |
| Organization   |         |                    |              |
+----------------+---------+--------------------+--------------+
|   EXTERNAL LAB |         |                    |              |
+----------------+---------+--------------------+--------------+
 External Lab: Urinalysis (2015)
 
 
+-------------+----------+-----------+------------+--------------+
| Component   | Value    | Ref Range | Performed  | Pathologist  |
|             |          |           | At         | Signature    |
+-------------+----------+-----------+------------+--------------+
| UA Blood,   | negative |           | EXTERNAL   |              |
| External    |          |           | LAB        |              |
+-------------+----------+-----------+------------+--------------+
| UA Glucose, | normal   |           | EXTERNAL   |              |
|  External   |          |           | LAB        |              |
+-------------+----------+-----------+------------+--------------+
| UA Ketones, | negative |           | EXTERNAL   |              |
|  External   |          |           | LAB        |              |
+-------------+----------+-----------+------------+--------------+
| UA Ph,      | 6        |           | EXTERNAL   |              |
| External    |          |           | LAB        |              |
+-------------+----------+-----------+------------+--------------+
| UA          | 25 mg/dl |           | EXTERNAL   |              |
| Proteins,   |          |           | LAB        |              |
| External    |          |           |            |              |
+-------------+----------+-----------+------------+--------------+
| UA RBC,     | 10       |           | EXTERNAL   |              |
| External    |          |           | LAB        |              |
+-------------+----------+-----------+------------+--------------+
| UA Specific | 1.011    |           | EXTERNAL   |              |
|  Gravity,   |          |           | LAB        |              |
| External    |          |           |            |              |
+-------------+----------+-----------+------------+--------------+
| UA          | 500      |           | EXTERNAL   |              |
| Leukocyte   |          |           | LAB        |              |
| Esterase,   |          |           |            |              |
| External    |          |           |            |              |
+-------------+----------+-----------+------------+--------------+
 
 
 
+--------------------------+
| Resulting Agency Comment |
+--------------------------+
|   [facility]              |
+--------------------------+
 
 
 
+----------------+---------+--------------------+--------------+
| Performing     | Address | City/State/Zipcode | Phone Number |
| Organization   |         |                    |              |
+----------------+---------+--------------------+--------------+
|   EXTERNAL LAB |         |                    |              |
+----------------+---------+--------------------+--------------+
 documented in this encounter
 
 Visit Diagnoses
 Not on filedocumented in this encounter

## 2020-01-08 NOTE — XMS
Encounter Summary
  Created on: 2020
 
 Viviana Ricci
 External Reference #: [id number]
 : 47
 Sex: Female
 
 Demographics
 
 
+-----------------------+----------------------+
| Address               | [address]      |
|                       | [place]  [address] |
+-----------------------+----------------------+
| Home Phone            | [phone]      |
+-----------------------+----------------------+
| Preferred Language    | Unknown              |
+-----------------------+----------------------+
| Marital Status        | Single               |
+-----------------------+----------------------+
| J[employer] Affiliation | [id number]                 |
+-----------------------+----------------------+
| Race                  | Unknown              |
+-----------------------+----------------------+
| Ethnic Group          | Unknown              |
+-----------------------+----------------------+
 
 
 Author
 
 
+--------------+--------------------------------------------+
| Author       | [facility]  |
|              | and [place]                                |
+--------------+--------------------------------------------+
| Organization | [facility]  |
|              | and [place]                                |
+--------------+--------------------------------------------+
| Address      | Unknown                                    |
+--------------+--------------------------------------------+
| Phone        | Unavailable                                |
+--------------+--------------------------------------------+
 
 
 
 Support
 
 
+----------------+--------------+---------------------+-----------------+
| Name           | Relationship | Address             | Phone           |
+----------------+--------------+---------------------+-----------------+
| Ada/Ed Radhames | ECON         | [address]              | [phone] |
|                |              | [place]  |                 |
|                |              |  [address]              |                 |
+----------------+--------------+---------------------+-----------------+
 
 
 
 
 Care Team Providers
 
 
+-----------------------+------+-------------+
| Care Team Member Name | Role | Phone       |
+-----------------------+------+-------------+
 PCP  | Unavailable |
+-----------------------+------+-------------+
 
 
 
 Reason for Visit
 
 
+--------+-------------------+
| Reason | Comments          |
+--------+-------------------+
| Other  | exertional oxygen |
+--------+-------------------+
 
 
 
 Encounter Details
 
 
+--------+-----------+----------------------+----------------+--------------------+
| Date   | Type      | Department           | Care Team      | Description        |
+--------+-----------+----------------------+----------------+--------------------+
| / | Telephone |   [facility] [place]          |   Off[facility],  | Other (exertional  |
|    |           | PULMONARY  [address]     | Nena GUSMAN MD  | oxygen)            |
|        |           | [address]  [place], |                |                    |
|        |           |  [place] [address]       |                |                    |
|        |           | [phone]         |                |                    |
+--------+-----------+----------------------+----------------+--------------------+
 
 
 
 Social History
 
 
+--------------+-------+-----------+--------+------+
| Tobacco Use  | Types | Packs/Day | Years  | Date |
|              |       |           | Used   |      |
+--------------+-------+-----------+--------+------+
| Never Smoker |       |           |        |      |
+--------------+-------+-----------+--------+------+
 
 
 
+---------------------+---+---+---+
| Smokeless Tobacco:  |   |   |   |
| Never Used          |   |   |   |
+---------------------+---+---+---+
 
 
 
+---------------------------------------------------------------+
| Comments: some second hand smoke exposure, but fairly minimal |
+---------------------------------------------------------------+
 
 
 
 
+-------------+-------------+---------+----------+
| Alcohol Use | Drinks/Week | oz/Week | Comments |
+-------------+-------------+---------+----------+
| No          |             |         |          |
+-------------+-------------+---------+----------+
 
 
 
+------------------+---------------+
| Sex Assigned at  | Date Recorded |
| Birth            |               |
+------------------+---------------+
| Not on file      |               |
+------------------+---------------+
 
 
 
+----------------+-------------+-------------+
| Job Start Date | Occupation  | Industry    |
+----------------+-------------+-------------+
| Not on file    | Not on file | Not on file |
+----------------+-------------+-------------+
 
 
 
+----------------+--------------+------------+
| Travel History | Travel Start | Travel End |
+----------------+--------------+------------+
 
 
 
+-------------------------------------+
| No recent travel history available. |
+-------------------------------------+
 documented as of this encounter
 
 Plan of Treatment
 
 
+--------+-----------+------------+----------------------+-------------------+
| Date   | Type      | Specialty  | Care Team            | Description       |
+--------+-----------+------------+----------------------+-------------------+
| / | Office    | Cardiology |   Tonia Wilson,    |                   |
| 2020   | Visit     |            | ARNP  [address]   |                   |
|        |           |            | St  [place]  |                   |
|        |           |            | [address]  [phone]  |                   |
|        |           |            |  [phone] (Fax)  |                   |
+--------+-----------+------------+----------------------+-------------------+
| / | Hospital  | Radiology  |   Popeye Townsend, |                   |
|    | Encounter |            |  MD  [address] |                   |
|        |           |            |  St.  [place],   |                   |
|        |           |            | [place] [address]             |                   |
|        |           |            | [phone]         |                   |
|        |           |            | [phone] (Fax)   |                   |
+--------+-----------+------------+----------------------+-------------------+
| / | Surgery   | Radiology  |   Popeye Townsend, | CV EP PPM SYSTEM  |
|    |           |            |  MD  [address] | IMPLANT           |
 
|        |           |            |  St.  [place],   |                   |
|        |           |            | [place] [address]             |                   |
|        |           |            | [phone]         |                   |
|        |           |            | [phone] (Fax)   |                   |
+--------+-----------+------------+----------------------+-------------------+
| 02/10/ | Clinical  | Cardiology |                      |                   |
|    | Support   |            |                      |                   |
+--------+-----------+------------+----------------------+-------------------+
| / | Office    | Cardiology |   Tonia Wilson,    |                   |
|    | Visit     |            | BELKIS  [address]   |                   |
|        |           |            | [place]  |                   |
|        |           |            | [address]  [phone]  |                   |
|        |           |            |  [phone] (Fax)  |                   |
+--------+-----------+------------+----------------------+-------------------+
| / | Off-Site  | Nephrology |   Marzena Moser  |                   |
|    | Visit     |            | DO ETIENNE  [address] [place] [address]      |                   |
|        |           |            | [address]      |                   |
|        |           |            | [place]      |                   |
|        |           |            | [address]  [phone]  |                   |
|        |           |            |  [phone] (Fax)  |                   |
+--------+-----------+------------+----------------------+-------------------+
 documented as of this encounter
 
 Visit Diagnoses
 Not on filedocumented in this encounter

## 2020-01-08 NOTE — XMS
Encounter Summary
  Created on: 2020
 
 Viviana Ricci
 External Reference #: [id number]
 : 47
 Sex: Female
 
 Demographics
 
 
+-----------------------+----------------------+
| Address               | [address]      |
|                       | [place]  [address] |
+-----------------------+----------------------+
| Home Phone            | [phone]      |
+-----------------------+----------------------+
| Preferred Language    | Unknown              |
+-----------------------+----------------------+
| Marital Status        | Single               |
+-----------------------+----------------------+
| Anabaptist Affiliation | [id number]                 |
+-----------------------+----------------------+
| Race                  | Unknown              |
+-----------------------+----------------------+
| Ethnic Group          | Unknown              |
+-----------------------+----------------------+
 
 
 Author
 
 
+--------------+--------------------------------------------+
| Author       | [facility]  |
|              | and [facility]                                |
+--------------+--------------------------------------------+
| Organization | [facility]  |
|              | and [facility]                                |
+--------------+--------------------------------------------+
| Address      | Unknown                                    |
+--------------+--------------------------------------------+
| Phone        | Unavailable                                |
+--------------+--------------------------------------------+
 
 
 
 Support
 
 
+----------------+--------------+---------------------+-----------------+
| Name           | Relationship | Address             | Phone           |
+----------------+--------------+---------------------+-----------------+
| Ada/Ed Radhames | ECON         | [place]              | [phone] |
|                |              | [place]  |                 |
|                |              |  [id number]              |                 |
+----------------+--------------+---------------------+-----------------+
 
 
 
 
 Care Team Providers
 
 
+-----------------------+------+-------------+
| Care Team Member Name | Role | Phone       |
+-----------------------+------+-------------+
 PCP  | Unavailable |
+-----------------------+------+-------------+
 
 
 
 Reason for Visit
 
 
+-------------------+----------+
| Reason            | Comments |
+-------------------+----------+
| Medication Refill |          |
+-------------------+----------+
 
 
 
 Encounter Details
 
 
+--------+--------+---------------------+----------------------+-------------------+
| Date   | Type   | Department          | Care Team            | Description       |
+--------+--------+---------------------+----------------------+-------------------+
| / | Refill |   [facility]         |   Marzena Moser  | Medication Refill |
| 2016   |        | NEPHROLOGY  [address]   | M, DO  [address]      |                   |
|        |        | [address]   | [address]      |                   |
|        |        | [place], [place]     | [place], [place]      |                   |
|        |        | [address]          | [address]  [phone]  |                   |
|        |        | [phone]        |  [phone] (Fax)  |                   |
+--------+--------+---------------------+----------------------+-------------------+
 
 
 
 Social History
 
 
+--------------+-------+-----------+--------+------+
| Tobacco Use  | Types | Packs/Day | Years  | Date |
|              |       |           | Used   |      |
+--------------+-------+-----------+--------+------+
| Never Smoker |       |           |        |      |
+--------------+-------+-----------+--------+------+
 
 
 
+---------------------+---+---+---+
| Smokeless Tobacco:  |   |   |   |
| Never Used          |   |   |   |
+---------------------+---+---+---+
 
 
 
+---------------------------------------------------------------+
| Comments: some second hand smoke exposure, but fairly minimal |
 
+---------------------------------------------------------------+
 
 
 
+-------------+-------------+---------+----------+
| Alcohol Use | Drinks/Week | oz/Week | Comments |
+-------------+-------------+---------+----------+
| No          |             |         |          |
+-------------+-------------+---------+----------+
 
 
 
+------------------+---------------+
| Sex Assigned at  | Date Recorded |
| Birth            |               |
+------------------+---------------+
| Not on file      |               |
+------------------+---------------+
 
 
 
+----------------+-------------+-------------+
| Job Start Date | Occupation  | Industry    |
+----------------+-------------+-------------+
| Not on file    | Not on file | Not on file |
+----------------+-------------+-------------+
 
 
 
+----------------+--------------+------------+
| Travel History | Travel Start | Travel End |
+----------------+--------------+------------+
 
 
 
+-------------------------------------+
| No recent travel history available. |
+-------------------------------------+
 documented as of this encounter
 
 Plan of Treatment
 
 
+--------+-----------+------------+----------------------+-------------------+
| Date   | Type      | Specialty  | Care Team            | Description       |
+--------+-----------+------------+----------------------+-------------------+
| / | Office    | Cardiology |   HellalfredoTonia,    |                   |
|    | Visit     |            | ARNP  [address]   |                   |
|        |           |            | St  [place], [place]  |                   |
|        |           |            | [address]  [phone]  |                   |
|        |           |            |  [phone] (Fax)  |                   |
+--------+-----------+------------+----------------------+-------------------+
| / | Hospital  | Radiology  |   Popeye Townsend, |                   |
|    | Encounter |            |  MD  [address] |                   |
|        |           |            |  St.  [place],   |                   |
|        |           |            | [place] [address]             |                   |
|        |           |            | [phone]         |                   |
|        |           |            | [phone] (Fax)   |                   |
+--------+-----------+------------+----------------------+-------------------+
| / | Surgery   | Radiology  |   Popeye Townsend, | CV EP PPM SYSTEM  |
 
|    |           |            |  MD  [address] | IMPLANT           |
|        |           |            |  St.  [place],   |                   |
|        |           |            | [place] [address]             |                   |
|        |           |            | [phone]         |                   |
|        |           |            | [phone] (Fax)   |                   |
+--------+-----------+------------+----------------------+-------------------+
| 02/10/ | Clinical  | Cardiology |                      |                   |
|    | Support   |            |                      |                   |
+--------+-----------+------------+----------------------+-------------------+
| / | Office    | Cardiology |   Tonia Wilson,    |                   |
|    | Visit     |            | ARNP  [address]   |                   |
|        |           |            | [place]  |                   |
|        |           |            | [address]  [phone]  |                   |
|        |           |            |  [phone] (Fax)  |                   |
+--------+-----------+------------+----------------------+-------------------+
| / | Off-Site  | Nephrology |   Marzena Moser  |                   |
|    | Visit     |            | DO ETIENNE  [address] [place] [address]      |                   |
|        |           |            | [address]      |                   |
|        |           |            | [place]      |                   |
|        |           |            | [address]  [phone]  |                   |
|        |           |            |  [phone] (Fax)  |                   |
+--------+-----------+------------+----------------------+-------------------+
 documented as of this encounter
 
 Visit Diagnoses
 
 
+------------------------------------------------------------------------------------------+
| Diagnosis                                                                                |
+------------------------------------------------------------------------------------------+
|   Type 2 diabetes mellitus with ESRD (end-stage renal disease) (HCC) - Primary  Type II  |
| or unspecified type diabetes mellitus with renal manifestations, not stated as           |
| uncontrolled                                                                             |
+------------------------------------------------------------------------------------------+
|   Complication of transplanted kidney, unspecified complication                          |
+------------------------------------------------------------------------------------------+
|   DIABETES MELLITUS, TYPE II, UNCONTROLLED  Type II or unspecified type diabetes         |
| mellitus without mention of complication, uncontrolled                                   |
+------------------------------------------------------------------------------------------+
|   Hyperlipidemia, unspecified hyperlipidemia type                                        |
+------------------------------------------------------------------------------------------+
 documented in this encounter

## 2020-01-08 NOTE — XMS
Encounter Summary
  Created on: 2020
 
 Viviana Ricci
 External Reference #: [id number]
 : 47
 Sex: Female
 
 Demographics
 
 
+-----------------------+----------------------+
| Address               | [address]      |
|                       | [place]  [address] |
+-----------------------+----------------------+
| Home Phone            | [phone]      |
+-----------------------+----------------------+
| Preferred Language    | Unknown              |
+-----------------------+----------------------+
| Marital Status        | Single               |
+-----------------------+----------------------+
| [employer] Affiliation | [id number]                 |
+-----------------------+----------------------+
| Race                  | Unknown              |
+-----------------------+----------------------+
| Ethnic Group          | Unknown              |
+-----------------------+----------------------+
 
 
 Author
 
 
+--------------+--------------------------------------------+
| Author       | [facility]  |
|              | and [facility]                                |
+--------------+--------------------------------------------+
| Organization | [facility]  |
|              | and [place]                                |
+--------------+--------------------------------------------+
| Address      | Unknown                                    |
+--------------+--------------------------------------------+
| Phone        | Unavailable                                |
+--------------+--------------------------------------------+
 
 
 
 Support
 
 
+----------------+--------------+---------------------+-----------------+
| Name           | Relationship | Address             | Phone           |
+----------------+--------------+---------------------+-----------------+
| Ada/Ed Radhames | ECON         | [address]              | [phone] |
|                |              | [place]  |                 |
|                |              |  [address]              |                 |
+----------------+--------------+---------------------+-----------------+
 
 
 
 
 Care Team Providers
 
 
+-----------------------+------+-------------+
| Care Team Member Name | Role | Phone       |
+-----------------------+------+-------------+
 PCP  | Unavailable |
+-----------------------+------+-------------+
 
 
 
 Encounter Details
 
 
+--------+----------+---------------------+----------------------+-------------+
| Date   | Type     | Department          | Care Team            | Description |
+--------+----------+---------------------+----------------------+-------------+
| / | Abstract |   [facility]         |   Marzena Moser  |             |
|    |          | NEPHROLOGY  [address]   | M, DO  [address]      |             |
|        |          | [address]   | [address]      |             |
|        |          | [place], [place]     | [place]      |             |
|        |          | [address]          | [address]  [phone]  |             |
|        |          | [phone]        |  [phone] (Fax)  |             |
+--------+----------+---------------------+----------------------+-------------+
 
 
 
 Social History
 
 
+--------------+-------+-----------+--------+------+
| Tobacco Use  | Types | Packs/Day | Years  | Date |
|              |       |           | Used   |      |
+--------------+-------+-----------+--------+------+
| Never Smoker |       |           |        |      |
+--------------+-------+-----------+--------+------+
 
 
 
+---------------------+---+---+---+
| Smokeless Tobacco:  |   |   |   |
| Never Used          |   |   |   |
+---------------------+---+---+---+
 
 
 
+---------------------------------------------------------------+
| Comments: some second hand smoke exposure, but fairly minimal |
+---------------------------------------------------------------+
 
 
 
+-------------+-------------+---------+----------+
| Alcohol Use | Drinks/Week | oz/Week | Comments |
+-------------+-------------+---------+----------+
| No          |             |         |          |
+-------------+-------------+---------+----------+
 
 
 
 
+------------------+---------------+
| Sex Assigned at  | Date Recorded |
| Birth            |               |
+------------------+---------------+
| Not on file      |               |
+------------------+---------------+
 
 
 
+----------------+-------------+-------------+
| Job Start Date | Occupation  | Industry    |
+----------------+-------------+-------------+
| Not on file    | Not on file | Not on file |
+----------------+-------------+-------------+
 
 
 
+----------------+--------------+------------+
| Travel History | Travel Start | Travel End |
+----------------+--------------+------------+
 
 
 
+-------------------------------------+
| No recent travel history available. |
+-------------------------------------+
 documented as of this encounter
 
 Progress Notes
 Juju Glass RN - 2018  9:23 AM PDTUrinalysis shows WBC, leukocytes and bacteria 
present. 
 
 Patient denies symptoms of a UTI. Urine was sent to culture.
 
 Instructed to go to a walk in clinic if symptoms develop over the weekend. She verbalized u
nderstanding and will see Dr. Moser in clinic on Monday.Electronically signed by Juju Glass RN at 2018  9:55 AM PDTdocumented in this encounter
 
 Plan of Treatment
 
 
+--------+-----------+------------+----------------------+-------------------+
| Date   | Type      | Specialty  | Care Team            | Description       |
+--------+-----------+------------+----------------------+-------------------+
| / | Office    | Cardiology |   Tonia Wilosn,    |                   |
|    | Visit     |            | [address]   |                   |
|        |           |            | St  [place], [place]  |                   |
|        |           |            | [address]  [phone]  |                   |
|        |           |            |  [phone] (Fax)  |                   |
+--------+-----------+------------+----------------------+-------------------+
| / | Hospital  | Radiology  |   Popeye Townsend, |                   |
|    | Encounter |            |  MD  [address] |                   |
|        |           |            |  [place],   |                   |
|        |           |            | [place] [address]             |                   |
|        |           |            | [phone]         |                   |
|        |           |            | [phone] (Fax)   |                   |
+--------+-----------+------------+----------------------+-------------------+
| / | Surgery   | Radiology  |   Popeye Townsend, | CV EP PPM SYSTEM  |
|    |           |            |  MD  [address] | IMPLANT           |
 
|        |           |            |  St.  [place],   |                   |
|        |           |            | [place] [address]             |                   |
|        |           |            | [phone]         |                   |
|        |           |            | [phone] (Fax)   |                   |
+--------+-----------+------------+----------------------+-------------------+
| 02/10/ | Clinical  | Cardiology |                      |                   |
|    | Support   |            |                      |                   |
+--------+-----------+------------+----------------------+-------------------+
| / | Office    | Cardiology |   Tonia Wilson,    |                   |
|    | Visit     |            | [facility]  [address]   |                   |
|        |           |            | [place]  |                   |
|        |           |            | [address]  [phone]  |                   |
|        |           |            |  [phone] (Fax)  |                   |
+--------+-----------+------------+----------------------+-------------------+
| / | Off-Site  | Nephrology |   Marzena Moser  |                   |
|    | Visit     |            | DO [facility]  [address] [place] [address]      |                   |
|        |           |            | [address]      |                   |
|        |           |            | [place]      |                   |
|        |           |            | [address]  [phone]  |                   |
|        |           |            |  [phone] (Fax)  |                   |
+--------+-----------+------------+----------------------+-------------------+
 documented as of this encounter
 
 Procedures
 
 
+----------------------+--------+------------+----------------------+----------------------+
| Procedure Name       | Priori | Date/Time  | Associated Diagnosis | Comments             |
|                      | ty     |            |                      |                      |
+----------------------+--------+------------+----------------------+----------------------+
| EXTERNAL LAB: BUN    | Routin | 2018 |                      |   Results for this   |
|                      | e      |            |                      | procedure are in the |
|                      |        |            |                      |  results section.    |
+----------------------+--------+------------+----------------------+----------------------+
| EXTERNAL LAB:        | Routin | 2018 |                      |   Results for this   |
| GLUCOSE              | e      |            |                      | procedure are in the |
|                      |        |            |                      |  results section.    |
+----------------------+--------+------------+----------------------+----------------------+
| EXTERNAL LAB: ALT    | Routin | 2018 |                      |   Results for this   |
|                      | e      |            |                      | procedure are in the |
|                      |        |            |                      |  results section.    |
+----------------------+--------+------------+----------------------+----------------------+
| EXTERNAL LAB: AST    | Routin | 2018 |                      |   Results for this   |
|                      | e      |            |                      | procedure are in the |
|                      |        |            |                      |  results section.    |
+----------------------+--------+------------+----------------------+----------------------+
| EXTERNAL LAB:        | Routin | 2018 |                      |   Results for this   |
| ALKALINE PHOSPHATASE | e      |            |                      | procedure are in the |
|                      |        |            |                      |  results section.    |
+----------------------+--------+------------+----------------------+----------------------+
| EXTERNAL LAB:        | Routin | 2018 |                      |   Results for this   |
| BILIRUBIN, TOTAL     | e      |            |                      | procedure are in the |
|                      |        |            |                      |  results section.    |
+----------------------+--------+------------+----------------------+----------------------+
| EXTERNAL LAB:        | Routin | 2018 |                      |   Results for this   |
| ALBUMIN              | e      |            |                      | procedure are in the |
|                      |        |            |                      |  results section.    |
+----------------------+--------+------------+----------------------+----------------------+
| EXTERNAL LAB:        | Routin | 2018 |                      |   Results for this   |
| PROTEIN, TOTAL       | e      |            |                      | procedure are in the |
 
|                      |        |            |                      |  results section.    |
+----------------------+--------+------------+----------------------+----------------------+
| EXTERNAL LAB:        | Routin | 2018 |                      |   Results for this   |
| PHOSPHORUS           | e      |            |                      | procedure are in the |
|                      |        |            |                      |  results section.    |
+----------------------+--------+------------+----------------------+----------------------+
| EXTERNAL LAB:        | Routin | 2018 |                      |   Results for this   |
| MAGNESIUM            | e      |            |                      | procedure are in the |
|                      |        |            |                      |  results section.    |
+----------------------+--------+------------+----------------------+----------------------+
| EXTERNAL LAB:        | Routin | 2018 |                      |   Results for this   |
| CALCIUM              | e      |            |                      | procedure are in the |
|                      |        |            |                      |  results section.    |
+----------------------+--------+------------+----------------------+----------------------+
| EXTERNAL LAB: CARBON | Routin | 2018 |                      |   Results for this   |
|  DIOXIDE             | e      |            |                      | procedure are in the |
|                      |        |            |                      |  results section.    |
+----------------------+--------+------------+----------------------+----------------------+
| EXTERNAL LAB:        | Routin | 2018 |                      |   Results for this   |
| CHLORIDE             | e      |            |                      | procedure are in the |
|                      |        |            |                      |  results section.    |
+----------------------+--------+------------+----------------------+----------------------+
| EXTERNAL LAB:        | Routin | 2018 |                      |   Results for this   |
| POTASSIUM            | e      |            |                      | procedure are in the |
|                      |        |            |                      |  results section.    |
+----------------------+--------+------------+----------------------+----------------------+
| EXTERNAL LAB: SODIUM | Routin | 2018 |                      |   Results for this   |
|                      | e      |            |                      | procedure are in the |
|                      |        |            |                      |  results section.    |
+----------------------+--------+------------+----------------------+----------------------+
| EXTERNAL LAB:        | Routin | 2018 |                      |   Results for this   |
| URINALYSIS           | e      |            |                      | procedure are in the |
|                      |        |            |                      |  results section.    |
+----------------------+--------+------------+----------------------+----------------------+
| EXTERNAL LAB:        | Routin | 2018 |                      |   Results for this   |
| PROTEIN/CREATININE   | e      |            |                      | procedure are in the |
| RATIO                |        |            |                      |  results section.    |
+----------------------+--------+------------+----------------------+----------------------+
| EXTERNAL LAB: CBC    | Routin | 2018 |                      |   Results for this   |
|                      | e      |            |                      | procedure are in the |
|                      |        |            |                      |  results section.    |
+----------------------+--------+------------+----------------------+----------------------+
| EXTERNAL LAB:        | Routin | 2018 |                      |   Results for this   |
| TRIGLYCERIDES        | e      |            |                      | procedure are in the |
|                      |        |            |                      |  results section.    |
+----------------------+--------+------------+----------------------+----------------------+
| EXTERNAL LAB:        | Routin | 2018 |                      |   Results for this   |
| CHOLESTEROL, HDL     | e      |            |                      | procedure are in the |
|                      |        |            |                      |  results section.    |
+----------------------+--------+------------+----------------------+----------------------+
| EXTERNAL LAB:        | Routin | 2018 |                      |   Results for this   |
| CHOLESTEROL, TOTAL   | e      |            |                      | procedure are in the |
|                      |        |            |                      |  results section.    |
+----------------------+--------+------------+----------------------+----------------------+
| EXTERNAL LAB:        | Routin | 2018 |                      |   Results for this   |
| CHOLESTEROL, LDL     | e      |            |                      | procedure are in the |
|                      |        |            |                      |  results section.    |
+----------------------+--------+------------+----------------------+----------------------+
| EXTERNAL LAB: EGFR   | Routin | 2018 |                      |   Results for this   |
|                      | e      |            |                      | procedure are in the |
 
|                      |        |            |                      |  results section.    |
+----------------------+--------+------------+----------------------+----------------------+
| EXTERNAL LAB:        | Routin | 2018 |                      |   Results for this   |
| CREATININE           | e      |            |                      | procedure are in the |
|                      |        |            |                      |  results section.    |
+----------------------+--------+------------+----------------------+----------------------+
| URINALYSIS           | Routin | 2018 |                      |   Results for this   |
|                      | e      |            |                      | procedure are in the |
|                      |        |            |                      |  results section.    |
+----------------------+--------+------------+----------------------+----------------------+
| HEMOGLOBIN A1C       | Routin | 2018 |                      |   Results for this   |
|                      | e      |            |                      | procedure are in the |
|                      |        |            |                      |  results section.    |
+----------------------+--------+------------+----------------------+----------------------+
 documented in this encounter
 
 Results
 External Lab: Protein/Creatinine Ratio (2018)
 
+-------------+---------+-----------------+------------+--------------+
| Component   | Value   | Ref Range       | Performed  | Pathologist  |
|             |         |                 | At         | Signature    |
+-------------+---------+-----------------+------------+--------------+
| Protein/Cre | 0.7 (A) | 0.0 - 0.2 mg/mg |            |              |
| atinine     |         |                 |            |              |
| Ratio,      |         |                 |            |              |
| External    |         |                 |            |              |
+-------------+---------+-----------------+------------+--------------+
 
 
 
+----------+
| Specimen |
+----------+
|          |
+----------+
 Urinalysis (2018)
 
+-------------+--------+----------------+------------+--------------+
| Component   | Value  | Ref Range      | Performed  | Pathologist  |
|             |        |                | At         | Signature    |
+-------------+--------+----------------+------------+--------------+
| WBC UA      | 50     | /HPF           |            |              |
+-------------+--------+----------------+------------+--------------+
| BACTERIA UA | 4+ (A) | Negative /HPF  |            |              |
+-------------+--------+----------------+------------+--------------+
| Color,      | Yellow | Light Yellow,  |            |              |
| Urine       |        | Yellow         |            |              |
+-------------+--------+----------------+------------+--------------+
| Clarity     | Clear  |                |            |              |
+-------------+--------+----------------+------------+--------------+
 
 
 
+----------+
| Specimen |
+----------+
| Urine    |
+----------+
 External Lab: Urinalysis (2018)
 
 
+-------------+----------+-----------+------------+--------------+
| Component   | Value    | Ref Range | Performed  | Pathologist  |
|             |          |           | At         | Signature    |
+-------------+----------+-----------+------------+--------------+
| UA Blood,   | negative |           | EXTERNAL   |              |
| External    |          |           | LAB        |              |
+-------------+----------+-----------+------------+--------------+
| UA Glucose, | normal   |           | EXTERNAL   |              |
|  External   |          |           | LAB        |              |
+-------------+----------+-----------+------------+--------------+
| UA Ketones, | negative |           | EXTERNAL   |              |
|  External   |          |           | LAB        |              |
+-------------+----------+-----------+------------+--------------+
| UA Ph,      | 5        |           | EXTERNAL   |              |
| External    |          |           | LAB        |              |
+-------------+----------+-----------+------------+--------------+
| UA          | negative |           | EXTERNAL   |              |
| Proteins,   |          |           | LAB        |              |
| External    |          |           |            |              |
+-------------+----------+-----------+------------+--------------+
| UA RBC,     | 0        |           | EXTERNAL   |              |
| External    |          |           | LAB        |              |
+-------------+----------+-----------+------------+--------------+
| UA Specific | 1.010    |           | EXTERNAL   |              |
|  Gravity,   |          |           | LAB        |              |
| External    |          |           |            |              |
+-------------+----------+-----------+------------+--------------+
| UA          | 500      |           | EXTERNAL   |              |
| Leukocyte   |          |           | LAB        |              |
| Esterase,   |          |           |            |              |
| External    |          |           |            |              |
+-------------+----------+-----------+------------+--------------+
 
 
 
+--------------------------+
| Resulting Agency Comment |
+--------------------------+
|   [facility]              |
+--------------------------+
 
 
 
+----------------+---------+--------------------+--------------+
| Performing     | Address | City/State/Zipcode | Phone Number |
| Organization   |         |                    |              |
+----------------+---------+--------------------+--------------+
|   EXTERNAL LAB |         |                    |              |
+----------------+---------+--------------------+--------------+
 External Lab: CBC (2018)
 
+-----------+----------+-----------+------------+--------------+
| Component | Value    | Ref Range | Performed  | Pathologist  |
|           |          |           | At         | Signature    |
+-----------+----------+-----------+------------+--------------+
| WBC,      | 7.1      | 4.5 - 11  | EXTERNAL   |              |
| External  |          |           | LAB        |              |
+-----------+----------+-----------+------------+--------------+
| HGB,      | 14.7     | 12 - 16   | EXTERNAL   |              |
 
| External  |          |           | LAB        |              |
+-----------+----------+-----------+------------+--------------+
| HCT,      | 45.2 (A) | 35 - 45   | EXTERNAL   |              |
| External  |          |           | LAB        |              |
+-----------+----------+-----------+------------+--------------+
| PLT,      | 135 (A)  | 140 - 440 | EXTERNAL   |              |
| External  |          |           | LAB        |              |
+-----------+----------+-----------+------------+--------------+
| RBC,      | 4.42     | 3.8 - 5.1 | EXTERNAL   |              |
| External  |          |           | LAB        |              |
+-----------+----------+-----------+------------+--------------+
| MCV,      | 102 (A)  | 81 - 99   | EXTERNAL   |              |
| External  |          |           | LAB        |              |
+-----------+----------+-----------+------------+--------------+
| RDW,      | 14.1     | 10.5 - 15 | EXTERNAL   |              |
| External  |          |           | LAB        |              |
+-----------+----------+-----------+------------+--------------+
 
 
 
+--------------------------+
| Resulting Agency Comment |
+--------------------------+
|   [facility]              |
+--------------------------+
 
 
 
+----------------+---------+--------------------+--------------+
| Performing     | Address | City/State/Zipcode | Phone Number |
| Organization   |         |                    |              |
+----------------+---------+--------------------+--------------+
|   EXTERNAL LAB |         |                    |              |
+----------------+---------+--------------------+--------------+
 Hemoglobin A1C (2018)
 
+-------------+-------+-----------+------------+--------------+
| Component   | Value | Ref Range | Performed  | Pathologist  |
|             |       |           | At         | Signature    |
+-------------+-------+-----------+------------+--------------+
| Hemoglobin  | 7.1   | %         | EXTERNAL   |              |
| A1c         |       |           | LAB        |              |
+-------------+-------+-----------+------------+--------------+
 
 
 
+----------+
| Specimen |
+----------+
| Blood    |
+----------+
 
 
 
+--------------------------+
| Resulting Agency Comment |
+--------------------------+
|   [facility]              |
+--------------------------+
 
 
 
 
+----------------+---------+--------------------+--------------+
| Performing     | Address | City/State/Zipcode | Phone Number |
| Organization   |         |                    |              |
+----------------+---------+--------------------+--------------+
|   EXTERNAL LAB |         |                    |              |
+----------------+---------+--------------------+--------------+
 External Lab: ALMA (2018)
 
+-----------+--------+-----------+------------+--------------+
| Component | Value  | Ref Range | Performed  | Pathologist  |
|           |        |           | At         | Signature    |
+-----------+--------+-----------+------------+--------------+
| BUN,      | 48 (A) | 6 - 23    | EXTERNAL   |              |
| External  |        |           | LAB        |              |
+-----------+--------+-----------+------------+--------------+
 
 
 
+--------------------------+
| Resulting Agency Comment |
+--------------------------+
|   [facility]              |
+--------------------------+
 
 
 
+----------------+---------+--------------------+--------------+
| Performing     | Address | City/State/Zipcode | Phone Number |
| Organization   |         |                    |              |
+----------------+---------+--------------------+--------------+
|   EXTERNAL LAB |         |                    |              |
+----------------+---------+--------------------+--------------+
 External Lab: Glucose (2018)
 
+-----------+-------+-----------+------------+--------------+
| Component | Value | Ref Range | Performed  | Pathologist  |
|           |       |           | At         | Signature    |
+-----------+-------+-----------+------------+--------------+
| Glucose,  | 87    | 70 - 100  | EXTERNAL   |              |
| External  |       |           | LAB        |              |
+-----------+-------+-----------+------------+--------------+
 
 
 
+--------------------------+
| Resulting Agency Comment |
+--------------------------+
|   [facility]              |
+--------------------------+
 
 
 
+----------------+---------+--------------------+--------------+
| Performing     | Address | City/State/Zipcode | Phone Number |
| Organization   |         |                    |              |
+----------------+---------+--------------------+--------------+
|   EXTERNAL LAB |         |                    |              |
+----------------+---------+--------------------+--------------+
 
 External Lab: ALT (2018)
 
+-----------+-------+-----------+------------+--------------+
| Component | Value | Ref Range | Performed  | Pathologist  |
|           |       |           | At         | Signature    |
+-----------+-------+-----------+------------+--------------+
| ALT,      | 16    | 7 - 52    | EXTERNAL   |              |
| External  |       |           | LAB        |              |
+-----------+-------+-----------+------------+--------------+
 
 
 
+--------------------------+
| Resulting Agency Comment |
+--------------------------+
|   [facility]              |
+--------------------------+
 
 
 
+----------------+---------+--------------------+--------------+
| Performing     | Address | City/State/Zipcode | Phone Number |
| Organization   |         |                    |              |
+----------------+---------+--------------------+--------------+
|   EXTERNAL LAB |         |                    |              |
+----------------+---------+--------------------+--------------+
 External Lab: AST (2018)
 
+-----------+-------+-----------+------------+--------------+
| Component | Value | Ref Range | Performed  | Pathologist  |
|           |       |           | At         | Signature    |
+-----------+-------+-----------+------------+--------------+
| AST,      | 24    | 13 - 39   | EXTERNAL   |              |
| External  |       |           | LAB        |              |
+-----------+-------+-----------+------------+--------------+
 
 
 
+--------------------------+
| Resulting Agency Comment |
+--------------------------+
|   [facility]              |
+--------------------------+
 
 
 
+----------------+---------+--------------------+--------------+
| Performing     | Address | City/State/Zipcode | Phone Number |
| Organization   |         |                    |              |
+----------------+---------+--------------------+--------------+
|   EXTERNAL LAB |         |                    |              |
+----------------+---------+--------------------+--------------+
 External Lab: Alkaline Phosphatase (2018)
 
+-----------+-------+-----------+------------+--------------+
| Component | Value | Ref Range | Performed  | Pathologist  |
|           |       |           | At         | Signature    |
+-----------+-------+-----------+------------+--------------+
| ALP,      | 106   | 31 - 130  | EXTERNAL   |              |
| External  |       |           | LAB        |              |
 
+-----------+-------+-----------+------------+--------------+
 
 
 
+--------------------------+
| Resulting Agency Comment |
+--------------------------+
|   [facility]              |
+--------------------------+
 
 
 
+----------------+---------+--------------------+--------------+
| Performing     | Address | City/State/Zipcode | Phone Number |
| Organization   |         |                    |              |
+----------------+---------+--------------------+--------------+
|   EXTERNAL LAB |         |                    |              |
+----------------+---------+--------------------+--------------+
 External Lab: Bilirubin, Total (2018)
 
+-------------+-------+-----------+------------+--------------+
| Component   | Value | Ref Range | Performed  | Pathologist  |
|             |       |           | At         | Signature    |
+-------------+-------+-----------+------------+--------------+
| Bilirubin,  | 0.6   | 0 - 1.2   | EXTERNAL   |              |
| Total,      |       |           | LAB        |              |
| External    |       |           |            |              |
+-------------+-------+-----------+------------+--------------+
 
 
 
+--------------------------+
| Resulting Agency Comment |
+--------------------------+
|   [facility]              |
+--------------------------+
 
 
 
+----------------+---------+--------------------+--------------+
| Performing     | Address | City/State/Zipcode | Phone Number |
| Organization   |         |                    |              |
+----------------+---------+--------------------+--------------+
|   EXTERNAL LAB |         |                    |              |
+----------------+---------+--------------------+--------------+
 External Lab: Albumin (2018)
 
+-----------+-------+-----------+------------+--------------+
| Component | Value | Ref Range | Performed  | Pathologist  |
|           |       |           | At         | Signature    |
+-----------+-------+-----------+------------+--------------+
| Albumin,  | 3.9   | 3.5 - 5   | EXTERNAL   |              |
| External  |       |           | LAB        |              |
+-----------+-------+-----------+------------+--------------+
 
 
 
+--------------------------+
| Resulting Agency Comment |
+--------------------------+
 
|   [facility]              |
+--------------------------+
 
 
 
+----------------+---------+--------------------+--------------+
| Performing     | Address | City/State/Zipcode | Phone Number |
| Organization   |         |                    |              |
+----------------+---------+--------------------+--------------+
|   EXTERNAL LAB |         |                    |              |
+----------------+---------+--------------------+--------------+
 External Lab: Protein, Total (2018)
 
+-----------+-------+-----------+------------+--------------+
| Component | Value | Ref Range | Performed  | Pathologist  |
|           |       |           | At         | Signature    |
+-----------+-------+-----------+------------+--------------+
| Protein,  | 6.4   | 6 - 8     | EXTERNAL   |              |
| Total,    |       |           | LAB        |              |
| External  |       |           |            |              |
+-----------+-------+-----------+------------+--------------+
 
 
 
+--------------------------+
| Resulting Agency Comment |
+--------------------------+
|   [facility]              |
+--------------------------+
 
 
 
+----------------+---------+--------------------+--------------+
| Performing     | Address | City/State/Zipcode | Phone Number |
| Organization   |         |                    |              |
+----------------+---------+--------------------+--------------+
|   EXTERNAL LAB |         |                    |              |
+----------------+---------+--------------------+--------------+
 External Lab: Phosphorus (2018)
 
+-------------+-------+-----------+------------+--------------+
| Component   | Value | Ref Range | Performed  | Pathologist  |
|             |       |           | At         | Signature    |
+-------------+-------+-----------+------------+--------------+
| Phosphorus, | 3.3   | 2.5 - 5   | EXTERNAL   |              |
|  External   |       |           | LAB        |              |
+-------------+-------+-----------+------------+--------------+
 
 
 
+--------------------------+
| Resulting Agency Comment |
+--------------------------+
|   [facility]              |
+--------------------------+
 
 
 
+----------------+---------+--------------------+--------------+
| Performing     | Address | City/State/Zipcode | Phone Number |
 
| Organization   |         |                    |              |
+----------------+---------+--------------------+--------------+
|   EXTERNAL LAB |         |                    |              |
+----------------+---------+--------------------+--------------+
 External Lab: Magnesium (2018)
 
+-------------+-------+-----------+------------+--------------+
| Component   | Value | Ref Range | Performed  | Pathologist  |
|             |       |           | At         | Signature    |
+-------------+-------+-----------+------------+--------------+
| Magnesium,  | 2.1   | 1.7 - 2.5 | EXTERNAL   |              |
| External    |       |           | LAB        |              |
+-------------+-------+-----------+------------+--------------+
 
 
 
+--------------------------+
| Resulting Agency Comment |
+--------------------------+
|   [facility]              |
+--------------------------+
 
 
 
+----------------+---------+--------------------+--------------+
| Performing     | Address | City/State/Zipcode | Phone Number |
| Organization   |         |                    |              |
+----------------+---------+--------------------+--------------+
|   EXTERNAL LAB |         |                    |              |
+----------------+---------+--------------------+--------------+
 External Lab: Calcium (2018)
 
+-----------+----------+------------+------------+--------------+
| Component | Value    | Ref Range  | Performed  | Pathologist  |
|           |          |            | At         | Signature    |
+-----------+----------+------------+------------+--------------+
| Calcium,  | 10.4 (A) | 8.4 - 10.2 | EXTERNAL   |              |
| External  |          |            | LAB        |              |
+-----------+----------+------------+------------+--------------+
 
 
 
+--------------------------+
| Resulting Agency Comment |
+--------------------------+
|   [facility]              |
+--------------------------+
 
 
 
+----------------+---------+--------------------+--------------+
| Performing     | Address | City/State/Zipcode | Phone Number |
| Organization   |         |                    |              |
+----------------+---------+--------------------+--------------+
|   EXTERNAL LAB |         |                    |              |
+----------------+---------+--------------------+--------------+
 External Lab: Carbon Dioxide (2018)
 
+-----------+--------+-----------+------------+--------------+
| Component | Value  | Ref Range | Performed  | Pathologist  |
 
|           |        |           | At         | Signature    |
+-----------+--------+-----------+------------+--------------+
| Carbon    | 17 (A) | 19 - 31   | EXTERNAL   |              |
| Dioxide,  |        |           | LAB        |              |
| External  |        |           |            |              |
+-----------+--------+-----------+------------+--------------+
 
 
 
+--------------------------+
| Resulting Agency Comment |
+--------------------------+
|   [facility]              |
+--------------------------+
 
 
 
+----------------+---------+--------------------+--------------+
| Performing     | Address | City/State/Zipcode | Phone Number |
| Organization   |         |                    |              |
+----------------+---------+--------------------+--------------+
|   EXTERNAL LAB |         |                    |              |
+----------------+---------+--------------------+--------------+
 External Lab: Chloride (2018)
 
+------------+-------+-----------+------------+--------------+
| Component  | Value | Ref Range | Performed  | Pathologist  |
|            |       |           | At         | Signature    |
+------------+-------+-----------+------------+--------------+
| Chloride,  | 104   | 95 - 112  | EXTERNAL   |              |
| External   |       |           | LAB        |              |
+------------+-------+-----------+------------+--------------+
 
 
 
+--------------------------+
| Resulting Agency Comment |
+--------------------------+
|   [facility]              |
+--------------------------+
 
 
 
+----------------+---------+--------------------+--------------+
| Performing     | Address | City/State/Zipcode | Phone Number |
| Organization   |         |                    |              |
+----------------+---------+--------------------+--------------+
|   EXTERNAL LAB |         |                    |              |
+----------------+---------+--------------------+--------------+
 External Lab: Potassium (2018)
 
+-------------+-------+-----------+------------+--------------+
| Component   | Value | Ref Range | Performed  | Pathologist  |
|             |       |           | At         | Signature    |
+-------------+-------+-----------+------------+--------------+
| Potassium,  | 4.5   | 3.6 - 5.1 | EXTERNAL   |              |
| External    |       |           | LAB        |              |
+-------------+-------+-----------+------------+--------------+
 
 
 
 
+--------------------------+
| Resulting Agency Comment |
+--------------------------+
|   [facility]              |
+--------------------------+
 
 
 
+----------------+---------+--------------------+--------------+
| Performing     | Address | City/State/Zipcode | Phone Number |
| Organization   |         |                    |              |
+----------------+---------+--------------------+--------------+
|   EXTERNAL LAB |         |                    |              |
+----------------+---------+--------------------+--------------+
 External Lab: Sodium (2018)
 
+-----------+-------+-----------+------------+--------------+
| Component | Value | Ref Range | Performed  | Pathologist  |
|           |       |           | At         | Signature    |
+-----------+-------+-----------+------------+--------------+
| Sodium,   | 139   | 132 - 143 | EXTERNAL   |              |
| External  |       |           | LAB        |              |
+-----------+-------+-----------+------------+--------------+
 
 
 
+--------------------------+
| Resulting Agency Comment |
+--------------------------+
|   [facility]              |
+--------------------------+
 
 
 
+----------------+---------+--------------------+--------------+
| Performing     | Address | City/State/Zipcode | Phone Number |
| Organization   |         |                    |              |
+----------------+---------+--------------------+--------------+
|   EXTERNAL LAB |         |                    |              |
+----------------+---------+--------------------+--------------+
 External Lab: Triglycerides (2018)
 
+-------------+---------+-----------+------------+--------------+
| Component   | Value   | Ref Range | Performed  | Pathologist  |
|             |         |           | At         | Signature    |
+-------------+---------+-----------+------------+--------------+
| Triglycerid | 163 (A) | 30 - 150  | EXTERNAL   |              |
| es,         |         |           | LAB        |              |
| External    |         |           |            |              |
+-------------+---------+-----------+------------+--------------+
 
 
 
+----------+
| Specimen |
+----------+
| Blood    |
+----------+
 
 
 
 
+--------------------------+
| Resulting Agency Comment |
+--------------------------+
|   [facility]              |
+--------------------------+
 
 
 
+----------------+---------+--------------------+--------------+
| Performing     | Address | City/State/Zipcode | Phone Number |
| Organization   |         |                    |              |
+----------------+---------+--------------------+--------------+
|   EXTERNAL LAB |         |                    |              |
+----------------+---------+--------------------+--------------+
 External Lab: Cholesterol, HDL (2018)
 
+-------------+-------+-----------+------------+--------------+
| Component   | Value | Ref Range | Performed  | Pathologist  |
|             |       |           | At         | Signature    |
+-------------+-------+-----------+------------+--------------+
| HDL         | 53.7  | 40 mg/dl  | EXTERNAL   |              |
| Cholesterol |       |           | LAB        |              |
| , External  |       |           |            |              |
+-------------+-------+-----------+------------+--------------+
 
 
 
+----------+
| Specimen |
+----------+
| Blood    |
+----------+
 
 
 
+--------------------------+
| Resulting Agency Comment |
+--------------------------+
|   [facility]              |
+--------------------------+
 
 
 
+----------------+---------+--------------------+--------------+
| Performing     | Address | City/State/Zipcode | Phone Number |
| Organization   |         |                    |              |
+----------------+---------+--------------------+--------------+
|   EXTERNAL LAB |         |                    |              |
+----------------+---------+--------------------+--------------+
 External Lab: Cholesterol, Total (2018)
 
+-------------+-------+-----------+------------+--------------+
| Component   | Value | Ref Range | Performed  | Pathologist  |
|             |       |           | At         | Signature    |
+-------------+-------+-----------+------------+--------------+
| Cholesterol | 131   | 200 mg/dl | EXTERNAL   |              |
| , Total,    |       |           | LAB        |              |
| External    |       |           |            |              |
 
+-------------+-------+-----------+------------+--------------+
 
 
 
+----------+
| Specimen |
+----------+
| Blood    |
+----------+
 
 
 
+--------------------------+
| Resulting Agency Comment |
+--------------------------+
|   [facility]              |
+--------------------------+
 
 
 
+----------------+---------+--------------------+--------------+
| Performing     | Address | City/State/Zipcode | Phone Number |
| Organization   |         |                    |              |
+----------------+---------+--------------------+--------------+
|   EXTERNAL LAB |         |                    |              |
+----------------+---------+--------------------+--------------+
 External Lab: Cholesterol, LDL (2018)
 
+-------------+-------+-----------+------------+--------------+
| Component   | Value | Ref Range | Performed  | Pathologist  |
|             |       |           | At         | Signature    |
+-------------+-------+-----------+------------+--------------+
| LDL         | 45    | 100       | EXTERNAL   |              |
| Cholesterol |       |           | LAB        |              |
| , Direct,   |       |           |            |              |
| External    |       |           |            |              |
+-------------+-------+-----------+------------+--------------+
 
 
 
+----------+
| Specimen |
+----------+
| Blood    |
+----------+
 
 
 
+--------------------------+
| Resulting Agency Comment |
+--------------------------+
|   [facility]              |
+--------------------------+
 
 
 
+----------------+---------+--------------------+--------------+
| Performing     | Address | City/State/Zipcode | Phone Number |
| Organization   |         |                    |              |
+----------------+---------+--------------------+--------------+
 
|   EXTERNAL LAB |         |                    |              |
+----------------+---------+--------------------+--------------+
 External Lab: eGFR (2018)
 
+-----------+-------+-----------+------------+--------------+
| Component | Value | Ref Range | Performed  | Pathologist  |
|           |       |           | At         | Signature    |
+-----------+-------+-----------+------------+--------------+
| eGFR,     | 33    |           | EXTERNAL   |              |
| External  |       |           | LAB        |              |
+-----------+-------+-----------+------------+--------------+
 
 
 
+----------+
| Specimen |
+----------+
| Blood    |
+----------+
 
 
 
+--------------------------+
| Resulting Agency Comment |
+--------------------------+
|   [facility]              |
+--------------------------+
 
 
 
+----------------+---------+--------------------+--------------+
| Performing     | Address | City/State/Zipcode | Phone Number |
| Organization   |         |                    |              |
+----------------+---------+--------------------+--------------+
|   EXTERNAL LAB |         |                    |              |
+----------------+---------+--------------------+--------------+
 External Lab: Creatinine (2018)
 
+-------------+----------+------------+------------+--------------+
| Component   | Value    | Ref Range  | Performed  | Pathologist  |
|             |          |            | At         | Signature    |
+-------------+----------+------------+------------+--------------+
| Creatinine, | 1.54 (A) | 0.7 - 1.18 | EXTERNAL   |              |
|  External   |          |            | LAB        |              |
+-------------+----------+------------+------------+--------------+
 
 
 
+----------+
| Specimen |
+----------+
| Blood    |
+----------+
 
 
 
+--------------------------+
| Resulting Agency Comment |
+--------------------------+
|   [facility]              |
 
+--------------------------+
 
 
 
+----------------+---------+--------------------+--------------+
| Performing     | Address | City/State/Zipcode | Phone Number |
| Organization   |         |                    |              |
+----------------+---------+--------------------+--------------+
|   EXTERNAL LAB |         |                    |              |
+----------------+---------+--------------------+--------------+
 documented in this encounter
 
 Visit Diagnoses
 Not on filedocumented in this encounter

## 2020-01-08 NOTE — XMS
Encounter Summary
  Created on: 2020
 
 Viviana Ricci
 External Reference #: [id number]
 : 47
 Sex: Female
 
 Demographics
 
 
+-----------------------+----------------------+
| Address               | [address]      |
|                       | [place]  [address] |
+-----------------------+----------------------+
| Home Phone            | [phone]      |
+-----------------------+----------------------+
| Preferred Language    | Unknown              |
+-----------------------+----------------------+
| Marital Status        | Single               |
+-----------------------+----------------------+
| Mor[employer] Affiliation | [id number]                 |
+-----------------------+----------------------+
| Race                  | Unknown              |
+-----------------------+----------------------+
| Ethnic Group          | Unknown              |
+-----------------------+----------------------+
 
 
 Author
 
 
+--------------+--------------------------------------------+
| Author       | [facility]  |
|              | and [place]                                |
+--------------+--------------------------------------------+
| Organization | [facility]  |
|              | and [place]                                |
+--------------+--------------------------------------------+
| Address      | Unknown                                    |
+--------------+--------------------------------------------+
| Phone        | Unavailable                                |
+--------------+--------------------------------------------+
 
 
 
 Support
 
 
+----------------+--------------+---------------------+-----------------+
| Name           | Relationship | Address             | Phone           |
+----------------+--------------+---------------------+-----------------+
| Ada/Ed Radhames | ECON         | [address]              | [phone] |
|                |              | [place]  |                 |
|                |              |  [address]              |                 |
+----------------+--------------+---------------------+-----------------+
 
 
 
 
 Care Team Providers
 
 
+------------------------+------+-----------------+
| Care Team Member Name  | Role | Phone           |
+------------------------+------+-----------------+
| Marzena Moser DO | PCP  | [phone] |
+------------------------+------+-----------------+
 
 
 
 Reason for Visit
 
 
+--------+----------+
| Reason | Comments |
+--------+----------+
| Other  | oxygen   |
+--------+----------+
 
 
 
 Encounter Details
 
 
+--------+-----------+---------------------+----------------------+-----------------+
| Date   | Type      | Department          | Care Team            | Description     |
+--------+-----------+---------------------+----------------------+-----------------+
| / | Telephone |   [facility]         |   Marzena Moser  | Other (oxygen ) |
| 2019   |           | NEPHROLOGY  [address]   | M, DO  [address]      |                 |
|        |           | [address]   | [address]      |                 |
|        |           | [place], [place]     | [place], [place]      |                 |
|        |           | [address]          | [address]  [phone]  |                 |
|        |           | [phone]        |  [phone] (Fax)  |                 |
+--------+-----------+---------------------+----------------------+-----------------+
 
 
 
 Social History
 
 
+--------------+-------+-----------+--------+------+
| Tobacco Use  | Types | Packs/Day | Years  | Date |
|              |       |           | Used   |      |
+--------------+-------+-----------+--------+------+
| Never Smoker |       |           |        |      |
+--------------+-------+-----------+--------+------+
 
 
 
+---------------------+---+---+---+
| Smokeless Tobacco:  |   |   |   |
| Never Used          |   |   |   |
+---------------------+---+---+---+
 
 
 
+---------------------------------------------------------------+
| Comments: some second hand smoke exposure, but fairly minimal |
 
+---------------------------------------------------------------+
 
 
 
+-------------+-------------+---------+----------+
| Alcohol Use | Drinks/Week | oz/Week | Comments |
+-------------+-------------+---------+----------+
| No          |             |         |          |
+-------------+-------------+---------+----------+
 
 
 
+------------------+---------------+
| Sex Assigned at  | Date Recorded |
| Birth            |               |
+------------------+---------------+
| Not on file      |               |
+------------------+---------------+
 
 
 
+----------------+-------------+-------------+
| Job Start Date | Occupation  | Industry    |
+----------------+-------------+-------------+
| Not on file    | Not on file | Not on file |
+----------------+-------------+-------------+
 
 
 
+----------------+--------------+------------+
| Travel History | Travel Start | Travel End |
+----------------+--------------+------------+
 
 
 
+-------------------------------------+
| No recent travel history available. |
+-------------------------------------+
 documented as of this encounter
 
 Plan of Treatment
 
 
+--------+-----------+------------+----------------------+-------------------+
| Date   | Type      | Specialty  | Care Team            | Description       |
+--------+-----------+------------+----------------------+-------------------+
| / | Office    | Cardiology |   Tonia Wilson,    |                   |
|    | Visit     |            | ARNJESSICA  [address]   |                   |
|        |           |            | St  [place], [place]  |                   |
|        |           |            | [address]  [phone]  |                   |
|        |           |            |  [phone] (Fax)  |                   |
+--------+-----------+------------+----------------------+-------------------+
| / | Hospital  | Radiology  |   Popeye Townsend, |                   |
2020   | Encounter |            |  MD  [address] |                   |
|        |           |            |  St.  [place],   |                   |
|        |           |            | [place] [address]             |                   |
|        |           |            | [phone]         |                   |
|        |           |            | [phone] (Fax)   |                   |
+--------+-----------+------------+----------------------+-------------------+
| / | Surgery   | Radiology  |   Popeye Townsend, | CV EP PPM SYSTEM  |
 
|    |           |            |  MD  [address] | IMPLANT           |
|        |           |            |  St.  [place],   |                   |
|        |           |            | [place] [address]             |                   |
|        |           |            | [phone]         |                   |
|        |           |            | [phone] (Fax)   |                   |
+--------+-----------+------------+----------------------+-------------------+
| 02/10/ | Clinical  | Cardiology |                      |                   |
2020   | Support   |            |                      |                   |
+--------+-----------+------------+----------------------+-------------------+
| / | Office    | Cardiology |   Tonia Wilson,    |                   |
|    | Visit     |            | ARNP  [address]   |                   |
|        |           |            | [place]  |                   |
|        |           |            | [address]  [phone]  |                   |
|        |           |            |  [phone] (Fax)  |                   |
+--------+-----------+------------+----------------------+-------------------+
| / | Off-Site  | Nephrology |   Marzena Moser  |                   |
|    | Visit     |            | DO ETIENNE  [facility]      |                   |
|        |           |            | [address]      |                   |
|        |           |            | [place]      |                   |
|        |           |            | [address]  [phone]  |                   |
|        |           |            |  [phone] (Fax)  |                   |
+--------+-----------+------------+----------------------+-------------------+
 documented as of this encounter
 
 Visit Diagnoses
 Not on filedocumented in this encounter

## 2020-01-08 NOTE — XMS
Encounter Summary
  Created on: 2020
 
 Viviana Ricci
 External Reference #: [id number]
 : 47
 Sex: Female
 
 Demographics
 
 
+-----------------------+----------------------+
| Address               | [address]      |
|                       | [place]  [address] |
+-----------------------+----------------------+
| Home Phone            | [phone]      |
+-----------------------+----------------------+
| Preferred Language    | Unknown              |
+-----------------------+----------------------+
| Marital Status        | Single               |
+-----------------------+----------------------+
| Pentecostal Affiliation | 1009                 |
+-----------------------+----------------------+
| Race                  | Unknown              |
+-----------------------+----------------------+
| Ethnic Group          | Unknown              |
+-----------------------+----------------------+
 
 
 Author
 
 
+--------------+--------------------------------------------+
| Author       | [facility]  |
|              | and [place]                                |
+--------------+--------------------------------------------+
| Organization | [facility]  |
|              | and [place]                                |
+--------------+--------------------------------------------+
| Address      | Unknown                                    |
+--------------+--------------------------------------------+
| Phone        | Unavailable                                |
+--------------+--------------------------------------------+
 
 
 
 Support
 
 
+----------------+--------------+---------------------+-----------------+
| Name           | Relationship | Address             | Phone           |
+----------------+--------------+---------------------+-----------------+
| Ada/Ed Radhames | ECON         | [address]              | [phone] |
|                |              | [place]  |                 |
|                |              |  [address]              |                 |
+----------------+--------------+---------------------+-----------------+
 
 
 
 
 Care Team Providers
 
 
+-----------------------+------+-------------+
| Care Team Member Name | Role | Phone       |
+-----------------------+------+-------------+
 PCP  | Unavailable |
+-----------------------+------+-------------+
 
 
 
 Reason for Visit
 
 
+---------+------------+
| Reason  | Comments   |
+---------+------------+
| Results | tacrolimus |
+---------+------------+
 
 
 
 Encounter Details
 
 
+--------+-----------+---------------------+----------------------+----------------------+
| Date   | Type      | Department          | Care Team            | Description          |
+--------+-----------+---------------------+----------------------+----------------------+
| / | Telephone |   [facility]         |   Marzena Moser  | Results (tacrolimus) |
|    |           | NEPHROLOGY  [address]   | M, DO  [address]      |                      |
|        |           | [address]   | [address]      |                      |
|        |           | [place], [place]     | [place], [place]      |                      |
|        |           | [address]          | [address]  [phone]  |                      |
|        |           | [phone]        |  [phone] (Fax)  |                      |
+--------+-----------+---------------------+----------------------+----------------------+
 
 
 
 Social History
 
 
+--------------+-------+-----------+--------+------+
| Tobacco Use  | Types | Packs/Day | Years  | Date |
|              |       |           | Used   |      |
+--------------+-------+-----------+--------+------+
| Never Smoker |       |           |        |      |
+--------------+-------+-----------+--------+------+
 
 
 
+---------------------+---+---+---+
| Smokeless Tobacco:  |   |   |   |
| Never Used          |   |   |   |
+---------------------+---+---+---+
 
 
 
+-------------+-------------+---------+----------+
| Alcohol Use | Drinks/Week | oz/Week | Comments |
 
+-------------+-------------+---------+----------+
| No          |             |         |          |
+-------------+-------------+---------+----------+
 
 
 
+------------------+---------------+
| Sex Assigned at  | Date Recorded |
| Birth            |               |
+------------------+---------------+
| Not on file      |               |
+------------------+---------------+
 
 
 
+----------------+-------------+-------------+
| Job Start Date | Occupation  | Industry    |
+----------------+-------------+-------------+
| Not on file    | Not on file | Not on file |
+----------------+-------------+-------------+
 
 
 
+----------------+--------------+------------+
| Travel History | Travel Start | Travel End |
+----------------+--------------+------------+
 
 
 
+-------------------------------------+
| No recent travel history available. |
+-------------------------------------+
 documented as of this encounter
 
 Plan of Treatment
 
 
+--------+-----------+------------+----------------------+-------------------+
| Date   | Type      | Specialty  | Care Team            | Description       |
+--------+-----------+------------+----------------------+-------------------+
| / | Office    | Cardiology |   Tonia Wilson,    |                   |
|    | Visit     |            | ARNJESSICA  [address]   |                   |
|        |           |            | St  [place] [facility], [place]  |                   |
|        |           |            | [address]  [phone]  |                   |
|        |           |            |  [phone] (Fax)  |                   |
+--------+-----------+------------+----------------------+-------------------+
| / | Hospital  | Radiology  |   Popeye Townsend, |                   |
|    | Encounter |            |  MD  [address] |                   |
|        |           |            |  St.  [place],   |                   |
|        |           |            | [place] [address]             |                   |
|        |           |            | [phone]         |                   |
|        |           |            | [phone] (Fax)   |                   |
+--------+-----------+------------+----------------------+-------------------+
| / | Surgery   | Radiology  |   Popeye Townsend, | CV EP PPM SYSTEM  |
|    |           |            |  MD  [address] | IMPLANT           |
|        |           |            |  St.  [place],   |                   |
|        |           |            | [place] [address]             |                   |
|        |           |            | [phone]         |                   |
|        |           |            | [phone] (Fax)   |                   |
+--------+-----------+------------+----------------------+-------------------+
 
| 02/10/ | Clinical  | Cardiology |                      |                   |
|    | Support   |            |                      |                   |
+--------+-----------+------------+----------------------+-------------------+
| / | Office    | Cardiology |   Tonia Wilson,    |                   |
|    | Visit     |            | ARNP  [address]   |                   |
|        |           |            | St  [place], [place]  |                   |
|        |           |            | [address]  [phone]  |                   |
|        |           |            |  [phone] (Fax)  |                   |
+--------+-----------+------------+----------------------+-------------------+
| / | Off-Site  | Nephrology |   Marzena Moser  |                   |
|    | Visit     |            | DO ETIENNE  [address] [place] [address]      |                   |
|        |           |            | [address]      |                   |
|        |           |            | [place]      |                   |
|        |           |            | [id number] [phone]  |                   |
|        |           |            |  [phone] (Fax)  |                   |
+--------+-----------+------------+----------------------+-------------------+
 documented as of this encounter
 
 Visit Diagnoses
 Not on filedocumented in this encounter

## 2020-01-08 NOTE — XMS
Encounter Summary
  Created on: 2020
 
 Viviana Ricci
 External Reference #: [id number]
 : 47
 Sex: Female
 
 Demographics
 
 
+-----------------------+----------------------+
| Address               | [address]      |
|                       | [place]  [address] |
+-----------------------+----------------------+
| Home Phone            | [phone]      |
+-----------------------+----------------------+
| Preferred Language    | Unknown              |
+-----------------------+----------------------+
| Marital Status        | Single               |
+-----------------------+----------------------+
| Orthodox Affiliation | 1009                 |
+-----------------------+----------------------+
| Race                  | Unknown              |
+-----------------------+----------------------+
| Ethnic Group          | Unknown              |
+-----------------------+----------------------+
 
 
 Author
 
 
+--------------+--------------------------------------------+
| Author       | [facility]  |
|              | and [place]                                |
+--------------+--------------------------------------------+
| Organization | [facility]  |
|              | and [place]                                |
+--------------+--------------------------------------------+
| Address      | Unknown                                    |
+--------------+--------------------------------------------+
| Phone        | Unavailable                                |
+--------------+--------------------------------------------+
 
 
 
 Support
 
 
+----------------+--------------+---------------------+-----------------+
| Name           | Relationship | Address             | Phone           |
+----------------+--------------+---------------------+-----------------+
| Ada/Ed Radhames | ECON         | [place]              | [phone] |
|                |              | [place]  |                 |
|                |              |  [id number]              |                 |
+----------------+--------------+---------------------+-----------------+
 
 
 
 
 Care Team Providers
 
 
+-----------------------+------+-------------+
| Care Team Member Name | Role | Phone       |
+-----------------------+------+-------------+
 PCP  | Unavailable |
+-----------------------+------+-------------+
 
 
 
 Reason for Visit
 
 
+-------------------+----------+
| Reason            | Comments |
+-------------------+----------+
| Medication Refill |          |
+-------------------+----------+
 
 
 
 Encounter Details
 
 
+--------+--------+---------------------+----------------------+-------------------+
| Date   | Type   | Department          | Care Team            | Description       |
+--------+--------+---------------------+----------------------+-------------------+
| / | Refill |   [facility]         |   Marzena Moser  | Medication Refill |
|    |        | NEPHROLOGY  [address]   | M, DO  [address]      |                   |
|        |        | [address]   | [address]      |                   |
|        |        | [place], [place]     | [place], [place]      |                   |
|        |        | [address]          | [address]  [phone]  |                   |
|        |        | [phone]        |  [phone] (Fax)  |                   |
+--------+--------+---------------------+----------------------+-------------------+
 
 
 
 Social History
 
 
+--------------+-------+-----------+--------+------+
| Tobacco Use  | Types | Packs/Day | Years  | Date |
|              |       |           | Used   |      |
+--------------+-------+-----------+--------+------+
| Never Smoker |       |           |        |      |
+--------------+-------+-----------+--------+------+
 
 
 
+---------------------+---+---+---+
| Smokeless Tobacco:  |   |   |   |
| Never Used          |   |   |   |
+---------------------+---+---+---+
 
 
 
+-------------+-------------+---------+----------+
| Alcohol Use | Drinks/Week | oz/Week | Comments |
 
+-------------+-------------+---------+----------+
| No          |             |         |          |
+-------------+-------------+---------+----------+
 
 
 
+------------------+---------------+
| Sex Assigned at  | Date Recorded |
| Birth            |               |
+------------------+---------------+
| Not on file      |               |
+------------------+---------------+
 
 
 
+----------------+-------------+-------------+
| Job Start Date | Occupation  | Industry    |
+----------------+-------------+-------------+
| Not on file    | Not on file | Not on file |
+----------------+-------------+-------------+
 
 
 
+----------------+--------------+------------+
| Travel History | Travel Start | Travel End |
+----------------+--------------+------------+
 
 
 
+-------------------------------------+
| No recent travel history available. |
+-------------------------------------+
 documented as of this encounter
 
 Plan of Treatment
 
 
+--------+-----------+------------+----------------------+-------------------+
| Date   | Type      | Specialty  | Care Team            | Description       |
+--------+-----------+------------+----------------------+-------------------+
| / | Office    | Cardiology |   Tonia Wilson,    |                   |
|    | Visit     |            | BELKIS  [address]   |                   |
|        |           |            | St  [place], [place]  |                   |
|        |           |            | [id number] [phone]  |                   |
|        |           |            |  [phone] (Fax)  |                   |
+--------+-----------+------------+----------------------+-------------------+
| / | Hospital  | Radiology  |   Cary Himanshuraul, |                   |
|    | Encounter |            |  MD  [address] |                   |
|        |           |            |  St.  [place],   |                   |
|        |           |            | [place] [address]             |                   |
|        |           |            | [phone]         |                   |
|        |           |            | [phone] (Fax)   |                   |
+--------+-----------+------------+----------------------+-------------------+
| / | Surgery   | Radiology  |   Aimeechidi Yinpeeraul, | CV EP PPM SYSTEM  |
|    |           |            |  MD  [address] | IMPLANT           |
|        |           |            |  St.  [place],   |                   |
|        |           |            | [place] [address]             |                   |
|        |           |            | [phone]         |                   |
|        |           |            | [phone] (Fax)   |                   |
+--------+-----------+------------+----------------------+-------------------+
 
| 02/10/ | Clinical  | Cardiology |                      |                   |
|    | Support   |            |                      |                   |
+--------+-----------+------------+----------------------+-------------------+
| / | Office    | Cardiology |   Tonia Wilson,    |                   |
|    | Visit     |            | ARNP  [address]   |                   |
|        |           |            | St  [place], [place]  |                   |
|        |           |            | [address]  [phone]  |                   |
|        |           |            |  [phone] (Fax)  |                   |
+--------+-----------+------------+----------------------+-------------------+
| / | Off-Site  | Nephrology |   Marzena Moser  |                   |
|    | Visit     |            | DO ETIENNE  [address] [place] [address]      |                   |
|        |           |            | [address]      |                   |
|        |           |            | [place]      |                   |
|        |           |            | [address] [phone]  |                   |
|        |           |            |  [phone] (Fax)  |                   |
+--------+-----------+------------+----------------------+-------------------+
 documented as of this encounter
 
 Visit Diagnoses
 Not on filedocumented in this encounter

## 2020-01-08 NOTE — XMS
Encounter Summary
  Created on: 2020
 
 Viviana Ricci
 External Reference #: [id number]
 : 47
 Sex: Female
 
 Demographics
 
 
+-----------------------+----------------------+
| Address               | [address]      |
|                       | [place]  [address] |
+-----------------------+----------------------+
| Home Phone            | [phone]      |
+-----------------------+----------------------+
| Preferred Language    | Unknown              |
+-----------------------+----------------------+
| Marital Status        | Single               |
+-----------------------+----------------------+
| Islam Affiliation | 1009                 |
+-----------------------+----------------------+
| Race                  | Unknown              |
+-----------------------+----------------------+
| Ethnic Group          | Unknown              |
+-----------------------+----------------------+
 
 
 Author
 
 
+--------------+--------------------------------------------+
| Author       | [facility]  |
|              | and [place]                                |
+--------------+--------------------------------------------+
| Organization | [facility]  |
|              | and [place]                                |
+--------------+--------------------------------------------+
| Address      | Unknown                                    |
+--------------+--------------------------------------------+
| Phone        | Unavailable                                |
+--------------+--------------------------------------------+
 
 
 
 Support
 
 
+----------------+--------------+---------------------+-----------------+
| Name           | Relationship | Address             | Phone           |
+----------------+--------------+---------------------+-----------------+
| Ada/Ed Radhames | ECON         | [place]              | [phone] |
|                |              | [place]  |                 |
|                |              |  [address]              |                 |
+----------------+--------------+---------------------+-----------------+
 
 
 
 
 Care Team Providers
 
 
+-----------------------+------+-------------+
| Care Team Member Name | Role | Phone       |
+-----------------------+------+-------------+
 PCP  | Unavailable |
+-----------------------+------+-------------+
 
 
 
 Reason for Visit
 
 
+----------------+----------+
| Reason         | Comments |
+----------------+----------+
| Urinary Tract  |          |
| Infection      |          |
+----------------+----------+
 
 
 
 Encounter Details
 
 
+--------+-----------+---------------------+----------------------+----------------+
| Date   | Type      | Department          | Care Team            | Description    |
+--------+-----------+---------------------+----------------------+----------------+
| / | Telephone |   [facility]         |   Marzena Moser  | Urinary Tract  |
|    |           | NEPHROLOGY  [address]   | M, DO  [address]      | Infection      |
|        |           | [address]   | [address]      |                |
|        |           | [place], [place]     | [place], [place]      |                |
|        |           | [address]          | [address]  [phone]  |                |
|        |           | [phone]        |  [phone] (Fax)  |                |
+--------+-----------+---------------------+----------------------+----------------+
 
 
 
 Social History
 
 
+--------------+-------+-----------+--------+------+
| Tobacco Use  | Types | Packs/Day | Years  | Date |
|              |       |           | Used   |      |
+--------------+-------+-----------+--------+------+
| Never Smoker |       |           |        |      |
+--------------+-------+-----------+--------+------+
 
 
 
+---------------------+---+---+---+
| Smokeless Tobacco:  |   |   |   |
| Never Used          |   |   |   |
+---------------------+---+---+---+
 
 
 
+---------------------------------------------------------------+
 
| Comments: some second hand smoke exposure, but fairly minimal |
+---------------------------------------------------------------+
 
 
 
+-------------+-------------+---------+----------+
| Alcohol Use | Drinks/Week | oz/Week | Comments |
+-------------+-------------+---------+----------+
| No          |             |         |          |
+-------------+-------------+---------+----------+
 
 
 
+------------------+---------------+
| Sex Assigned at  | Date Recorded |
| Birth            |               |
+------------------+---------------+
| Not on file      |               |
+------------------+---------------+
 
 
 
+----------------+-------------+-------------+
| Job Start Date | Occupation  | Industry    |
+----------------+-------------+-------------+
| Not on file    | Not on file | Not on file |
+----------------+-------------+-------------+
 
 
 
+----------------+--------------+------------+
| Travel History | Travel Start | Travel End |
+----------------+--------------+------------+
 
 
 
+-------------------------------------+
| No recent travel history available. |
+-------------------------------------+
 documented as of this encounter
 
 Plan of Treatment
 
 
+--------+-----------+------------+----------------------+-------------------+
| Date   | Type      | Specialty  | Care Team            | Description       |
+--------+-----------+------------+----------------------+-------------------+
| / | Office    | Cardiology |   Tonia Wilson,    |                   |
|    | Visit     |            | ARNJESSICA  [address]   |                   |
|        |           |            | St  [place] [facility] [place]  |                   |
|        |           |            | [address]  [phone]  |                   |
|        |           |            |  [phone] (Fax)  |                   |
+--------+-----------+------------+----------------------+-------------------+
| / | Hospital  | Radiology  |   Popeye Townsend, |                   |
|    | Encounter |            |  MD  [address] |                   |
|        |           |            |  [place],   |                   |
|        |           |            | [place] [address]             |                   |
|        |           |            | [phone]         |                   |
|        |           |            | [phone] (Fax)   |                   |
+--------+-----------+------------+----------------------+-------------------+
 
| / | Surgery   | Radiology  |   Popeye Townsend, | CV EP PPM SYSTEM  |
|    |           |            |  MD  [address] | IMPLANT           |
|        |           |            |  St.  [place],   |                   |
|        |           |            | [place] [address]             |                   |
|        |           |            | [phone]         |                   |
|        |           |            | [phone] (Fax)   |                   |
+--------+-----------+------------+----------------------+-------------------+
| 02/10/ | Clinical  | Cardiology |                      |                   |
|    | Support   |            |                      |                   |
+--------+-----------+------------+----------------------+-------------------+
| / | Office    | Cardiology |   Tonia Wilson,    |                   |
|    | Visit     |            | ARNP  [address]   |                   |
|        |           |            | [place]  |                   |
|        |           |            | [address]  [phone]  |                   |
|        |           |            |  [phone] (Fax)  |                   |
+--------+-----------+------------+----------------------+-------------------+
| / | Off-Site  | Nephrology |   Marzena Moser  |                   |
|    | Visit     |            | DO ETIENNE  [address] [place] [address]      |                   |
|        |           |            | [address]      |                   |
|        |           |            | [place]      |                   |
|        |           |            | [address] [phone]  |                   |
|        |           |            |  [phone] (Fax)  |                   |
+--------+-----------+------------+----------------------+-------------------+
 documented as of this encounter
 
 Visit Diagnoses
 Not on filedocumented in this encounter

## 2020-01-08 NOTE — XMS
Encounter Summary
  Created on: 2020
 
 Ras Hardy
 External Reference #: [id number]
 : 47
 Sex: Female
 
 Demographics
 
 
+-----------------------+----------------------+
| Address               | [address]      |
|                       | [place]  [address] |
+-----------------------+----------------------+
| Home Phone            | [phone]      |
+-----------------------+----------------------+
| Preferred Language    | Unknown              |
+-----------------------+----------------------+
| Marital Status        | Single               |
+-----------------------+----------------------+
| [employer] Affiliation | [id number]                 |
+-----------------------+----------------------+
| Race                  | Unknown              |
+-----------------------+----------------------+
| Ethnic Group          | Unknown              |
+-----------------------+----------------------+
 
 
 Author
 
 
+--------------+--------------------------------------------+
| Author       | [facility]  |
|              | and [facility]                                |
+--------------+--------------------------------------------+
| Organization | [facility]  |
|              | and [facility]                                |
+--------------+--------------------------------------------+
| Address      | Unknown                                    |
+--------------+--------------------------------------------+
| Phone        | Unavailable                                |
+--------------+--------------------------------------------+
 
 
 
 Support
 
 
+----------------+--------------+---------------------+-----------------+
| Name           | Relationship | Address             | Phone           |
+----------------+--------------+---------------------+-----------------+
| Ada/Ed Radhames | ECON         | [address]              | [phone] |
|                |              | [place], [place]  |                 |
|                |              |  [id number]              |                 |
+----------------+--------------+---------------------+-----------------+
 
 
 
 
 Care Team Providers
 
 
+-----------------------+------+-------------+
| Care Team Member Name | Role | Phone       |
+-----------------------+------+-------------+
 PCP  | Unavailable |
+-----------------------+------+-------------+
 
 
 
 Encounter Details
 
 
+--------+-----------+----------------------+----------------------+-----------------+
| Date   | Type      | Department           | Care Team            | Description     |
+--------+-----------+----------------------+----------------------+-----------------+
| 01/15/ | [facility]  |   [facility] |   Edgar Sanders,   | Left hip pain;  |
|    | Encounter |  MED CTR XRAY  [address] | MD  [address] [facility]     | Knee pain;      |
|        |           |  [address]       | [place], [place]      | Leg pain        |
|        |           | [place] [address] | [address]  [phone]  |                 |
|        |           |   [phone]       |  [phone] (Fax)  |                 |
+--------+-----------+----------------------+----------------------+-----------------+
 
 
 
 Social History
 
 
+--------------+-------+-----------+--------+------+
| Tobacco Use  | Types | Packs/Day | Years  | Date |
|              |       |           | Used   |      |
+--------------+-------+-----------+--------+------+
| Never Smoker |       |           |        |      |
+--------------+-------+-----------+--------+------+
 
 
 
+---------------------+---+---+---+
| Smokeless Tobacco:  |   |   |   |
| Never Used          |   |   |   |
+---------------------+---+---+---+
 
 
 
+---------------------------------------------------------------+
| Comments: some second hand smoke exposure, but fairly minimal |
+---------------------------------------------------------------+
 
 
 
+-------------+-------------+---------+----------+
| Alcohol Use | Drinks/Week | oz/Week | Comments |
+-------------+-------------+---------+----------+
| No          |             |         |          |
+-------------+-------------+---------+----------+
 
 
 
 
+------------------+---------------+
| Sex Assigned at  | Date Recorded |
| Birth            |               |
+------------------+---------------+
| Not on file      |               |
+------------------+---------------+
 
 
 
+----------------+-------------+-------------+
| Job Start Date | Occupation  | Industry    |
+----------------+-------------+-------------+
| Not on file    | Not on file | Not on file |
+----------------+-------------+-------------+
 
 
 
+----------------+--------------+------------+
| Travel History | Travel Start | Travel End |
+----------------+--------------+------------+
 
 
 
+-------------------------------------+
| No recent travel history available. |
+-------------------------------------+
 documented as of this encounter
 
 Medications at Time of Discharge
 
 
+----------------------+----------------------+-----------+---------+----------+-----------+
| Medication           | Sig                  | Dispensed | Refills | Start    | End Date  |
|                      |                      |           |         | Date     |           |
+----------------------+----------------------+-----------+---------+----------+-----------+
|   glucose blood VI   | Check blood sugar    |   100     | 12      | 20 |           |
| test strips (ONE     | before each meal and | each      |         | 12       |           |
| TOUCH ULTRA TEST)    |  as directed         |           |         |          |           |
| stripIndications:    |                      |           |         |          |           |
| Unspecified          |                      |           |         |          |           |
| hypertensive kidney  |                      |           |         |          |           |
| disease with chronic |                      |           |         |          |           |
|  kidney disease      |                      |           |         |          |           |
| stage I through      |                      |           |         |          |           |
| stage IV, or         |                      |           |         |          |           |
| unspecified(403.90), |                      |           |         |          |           |
|  Complications of    |                      |           |         |          |           |
| transplanted kidney, |                      |           |         |          |           |
|  Diabetes mellitus   |                      |           |         |          |           |
| type II,             |                      |           |         |          |           |
| uncontrolled (HCC),  |                      |           |         |          |           |
| Hyperlipidemia       |                      |           |         |          |           |
+----------------------+----------------------+-----------+---------+----------+-----------+
|   Respiratory        | Decrease CPAP to     |   1 each  | 0       | 20 |           |
| Therapy Supplies     | 12-18 cmH2O          |           |         | 13       |           |
| MISC                 | Diagnosis            |           |         |          |           |
|                      | Code(s)327.23.       |           |         |          |           |
|                      | Please send order to |           |         |          |           |
|                      |  [facility].     |           |         |          |           |
+----------------------+----------------------+-----------+---------+----------+-----------+
 
|   albuterol (PROAIR  | Inhale 2 puffs into  |   1       | 2       | 20 |  |
| HFA) 90 mcg/puff     | the lungs every 6    | Inhaler   |         | 13       | 4         |
| inhalerIndications:  | hours as needed for  |           |         |          |           |
| Cough                | Wheezing.            |           |         |          |           |
+----------------------+----------------------+-----------+---------+----------+-----------+
|   allopurinol        | Take 1 tablet by     |   30      | 5       | 20 | 02/10/201 |
| (ZYLOPRIM) 100 mg    | mouth Daily.         | tablet    |         | 13       | 4         |
| tablet               |                      |           |         |          |           |
+----------------------+----------------------+-----------+---------+----------+-----------+
|   aspirin 81 MG EC   | Take 81 mg by mouth  |           | 0       | 20 |  |
| tablet               | Daily.               |           |         | 12       | 5         |
+----------------------+----------------------+-----------+---------+----------+-----------+
|   cephalexin         | Take 1 capsule by    |   6       | 0       | 20 |  |
| (KEFLEX) 500 mg      | mouth 2 times daily. | capsule   |         | 13       | 4         |
| capsule              |                      |           |         |          |           |
+----------------------+----------------------+-----------+---------+----------+-----------+
|   Cholecalciferol    | Take 5,000 Units by  |           | 0       | 20 |  |
| (VITAMIN D3) 5000    | mouth Once a week.   |           |         | 12       | 4         |
| UNITS CAPS           |                      |           |         |          |           |
+----------------------+----------------------+-----------+---------+----------+-----------+
|   cinacalcet         | Take 1 tablet by     |   30      | 12      | 20 |  |
| (SENSIPAR) 30 mg     | mouth Daily.         | tablet    |         | 13       | 4         |
| tablet               |                      |           |         |          |           |
+----------------------+----------------------+-----------+---------+----------+-----------+
|   fludrocortisone    | Take 1 tablet by     |   45      | 2       | 20 |  |
| (FLORINEF) 0.1 mg    | mouth Every other    | tablet    |         | 14       | 5         |
| tablet               | day.                 |           |         |          |           |
+----------------------+----------------------+-----------+---------+----------+-----------+
|   flunisolide        | 2 sprays by Nasal    |   25 mL   | 12      | 20 |  |
| (NASAREL) 29 MCG/ACT | route Daily. Dose is |           |         | 13       | 4         |
|  (0.025%) nasal      |  for each nostril.   |           |         |          |           |
| spray                |                      |           |         |          |           |
+----------------------+----------------------+-----------+---------+----------+-----------+
|   fluticasone        | Inhale 1 puff into   |   1       | 11      | 20 |  |
| (FLOVENT HFA) 220    | the lungs 2 times    | Inhaler   |         | 13       | 5         |
| mcg/puff inhaler     | daily. Rinse mouth   |           |         |          |           |
|                      | after use.           |           |         |          |           |
+----------------------+----------------------+-----------+---------+----------+-----------+
|   furosemide (LASIX) | Take 1 tablet by     |   30      | 5       | 20 |  |
|  40 mg tablet        | mouth Daily.         | tablet    |         | 13       | 4         |
+----------------------+----------------------+-----------+---------+----------+-----------+
|   insulin glargine   | Inject 20 Units      |   10 mL   | 0       | 20 |  |
| (LANTUS) 100         | under the skin every |           |         | 13       | 4         |
| units/mL             |  morning.            |           |         |          |           |
| injectionIndications |                      |           |         |          |           |
| : Unspecified        |                      |           |         |          |           |
| hypertensive kidney  |                      |           |         |          |           |
| disease with chronic |                      |           |         |          |           |
|  kidney disease      |                      |           |         |          |           |
| stage I through      |                      |           |         |          |           |
| stage IV, or         |                      |           |         |          |           |
| unspecified(403.90), |                      |           |         |          |           |
|  Complications of    |                      |           |         |          |           |
| transplanted kidney, |                      |           |         |          |           |
|  Diabetes mellitus   |                      |           |         |          |           |
| type II,             |                      |           |         |          |           |
| uncontrolled (HCC),  |                      |           |         |          |           |
| Hyperlipidemia       |                      |           |         |          |           |
+----------------------+----------------------+-----------+---------+----------+-----------+
|   insulin lispro     | Inject               |   10 pen  | 5       | 20 |  |
 
| (HUMALOG) 100        | subcutaneously       |           |         | 13       | 4         |
| units/mL injection   | before meals         |           |         |          |           |
|                      | according to sliding |           |         |          |           |
|                      |  scale               |           |         |          |           |
+----------------------+----------------------+-----------+---------+----------+-----------+
|   Insulin            | Use before meals and |   100     | 11      | 20 |  |
| Syringe-Needle U-100 |  as directed.        | each      |         | 13       | 4         |
|  (BD INSULIN SYRINGE |                      |           |         |          |           |
|  ULTRAFINE) 31G X    |                      |           |         |          |           |
| " 0.5 ML MISC    |                      |           |         |          |           |
+----------------------+----------------------+-----------+---------+----------+-----------+
|   levothyroxine      | Take 1 tablet by     |   30      | 11      | 20 | 10/06/201 |
| (LEVOTHROID) 50 mcg  | mouth Daily.         | tablet    |         | 13       | 4         |
| tablet               |                      |           |         |          |           |
+----------------------+----------------------+-----------+---------+----------+-----------+
|   lisinopril         | Take 1 tablet by     |   90      | 3       | 20 |  |
| (PRINIVIL,ZESTRIL)   | mouth Daily.         | tablet    |         | 13       | 4         |
| 30 MG tablet         |                      |           |         |          |           |
+----------------------+----------------------+-----------+---------+----------+-----------+
|   loperamide         | Take 2 mg by mouth   |           | 0       | 20 |  |
| (ANTI-DIARRHEAL) 2   | Daily as needed.     |           |         | 12       | 4         |
| mg capsule           |                      |           |         |          |           |
+----------------------+----------------------+-----------+---------+----------+-----------+
|   magnesium oxide    | Take 1 tablet by     |   62      | 12      | 20 |  |
| (MAG-OX) 400 mg      | mouth 2 times daily. | tablet    |         | 13       | 4         |
| tablet               |                      |           |         |          |           |
+----------------------+----------------------+-----------+---------+----------+-----------+
|   metoprolol         | Take 1 tablet by     |   60      | 11      | 20 |  |
| tartrate (LOPRESSOR) | mouth 2 times daily. | tablet    |         | 13       | 4         |
|  25 mg tablet        |                      |           |         |          |           |
+----------------------+----------------------+-----------+---------+----------+-----------+
|   mycophenolate      | Take 250 mg by mouth |           | 0       | 20 | 10/14/201 |
| (CELLCEPT) 250 mg    |  3 times daily.      |           |         | 11       | 5         |
| capsule              |                      |           |         |          |           |
+----------------------+----------------------+-----------+---------+----------+-----------+
|   omeprazole         | Take one capsule by  |   90      | 3       | 20 |  |
| (PRILOSEC) 20 mg     | mouth once daily on  | capsule   |         | 13       | 4         |
| capsule              | an empty stomach     |           |         |          |           |
+----------------------+----------------------+-----------+---------+----------+-----------+
|   predniSONE         | Take 1 tablet by     |   90      | 3       | 20 |  |
| (DELTASONE) 5 mg     | mouth Daily.         | tablet    |         | 12       | 4         |
| tablet               |                      |           |         |          |           |
+----------------------+----------------------+-----------+---------+----------+-----------+
|   Prenatal           | Take 0.8 mg by mouth |   30 each | 11      | 20 |  |
| Multivit-Min-Fe-FA   |  Daily.              |           |         | 13       | 4         |
| (PRENATAL VITAMINS)  |                      |           |         |          |           |
| 0.8 MG TABS          |                      |           |         |          |           |
+----------------------+----------------------+-----------+---------+----------+-----------+
|   Prenatal Vit-Fe    |                      |           | 0       | 20 |  |
| Fumarate-FA (PNV     |                      |           |         | 13       | 4         |
| PRENATAL PLUS        |                      |           |         |          |           |
| MULTIVITAMIN) 27-1   |                      |           |         |          |           |
| MG TABS              |                      |           |         |          |           |
+----------------------+----------------------+-----------+---------+----------+-----------+
|   rosuvastatin       | Take 1 tablet by     |   30      | 11      | 20 |  |
| (CRESTOR) 20 mg      | mouth nightly.       | tablet    |         | 13       | 4         |
| tablet               |                      |           |         |          |           |
+----------------------+----------------------+-----------+---------+----------+-----------+
|   tacrolimus         | TAD.                 |           | 0       | 20 | 07/15/201 |
| (PROGRAF) 0.5 mg     |                      |           |         | 12       | 4         |
 
| capsuleIndications:  |                      |           |         |          |           |
| Unspecified          |                      |           |         |          |           |
| hypertensive kidney  |                      |           |         |          |           |
| disease with chronic |                      |           |         |          |           |
|  kidney disease      |                      |           |         |          |           |
| stage I through      |                      |           |         |          |           |
| stage IV, or         |                      |           |         |          |           |
| unspecified(403.90), |                      |           |         |          |           |
|  Complications of    |                      |           |         |          |           |
| transplanted kidney, |                      |           |         |          |           |
|  Diabetes mellitus   |                      |           |         |          |           |
| type II,             |                      |           |         |          |           |
| uncontrolled (HCC),  |                      |           |         |          |           |
| Hyperlipidemia       |                      |           |         |          |           |
+----------------------+----------------------+-----------+---------+----------+-----------+
|   tacrolimus         | Take 1.5 mg by mouth |           | 0       | 20 |  |
| (PROGRAF) 1 mg       |  2 times daily.      |           |         | 13       | 4         |
| capsuleIndications:  |                      |           |         |          |           |
| Unspecified          |                      |           |         |          |           |
| hypertensive kidney  |                      |           |         |          |           |
| disease with chronic |                      |           |         |          |           |
|  kidney disease      |                      |           |         |          |           |
| stage I through      |                      |           |         |          |           |
| stage IV, or         |                      |           |         |          |           |
| unspecified(403.90), |                      |           |         |          |           |
|  FSGS (focal         |                      |           |         |          |           |
| segmental            |                      |           |         |          |           |
| glomerulosclerosis), |                      |           |         |          |           |
|  Hyperlipidemia,     |                      |           |         |          |           |
| Complications of     |                      |           |         |          |           |
| transplanted kidney  |                      |           |         |          |           |
+----------------------+----------------------+-----------+---------+----------+-----------+
|   tacrolimus         | Take 1 capsule by    |   62      | 12      | 20 |  |
| (PROGRAF) 1 mg       | mouth 2 times daily. | capsule   |         | 13       | 4         |
| capsuleIndications:  |                      |           |         |          |           |
| Unspecified          |                      |           |         |          |           |
| hypertensive kidney  |                      |           |         |          |           |
| disease with chronic |                      |           |         |          |           |
|  kidney disease      |                      |           |         |          |           |
| stage I through      |                      |           |         |          |           |
| stage IV, or         |                      |           |         |          |           |
| unspecified(403.90), |                      |           |         |          |           |
|  Complications of    |                      |           |         |          |           |
| transplanted kidney, |                      |           |         |          |           |
|  Diabetes mellitus   |                      |           |         |          |           |
| type II,             |                      |           |         |          |           |
| uncontrolled (HCC),  |                      |           |         |          |           |
| Hyperlipidemia       |                      |           |         |          |           |
+----------------------+----------------------+-----------+---------+----------+-----------+
|   valsartan (DIOVAN) | Take 1 tablet by     |   30      | 11      | 20 |  |
|  160 mg tablet       | mouth Daily.         | tablet    |         | 13       | 4         |
+----------------------+----------------------+-----------+---------+----------+-----------+
 documented as of this encounter
 
 Plan of Treatment
 
 
+--------+-----------+------------+----------------------+-------------------+
| Date   | Type      | Specialty  | Care Team            | Description       |
+--------+-----------+------------+----------------------+-------------------+
 
| / | Office    | Cardiology |   Tonia Wilson,    |                   |
|    | Visit     |            | [address]   |                   |
|        |           |            | [place], [place]  |                   |
|        |           |            | [address]  [phone]  |                   |
|        |           |            |  [phone] (Fax)  |                   |
+--------+-----------+------------+----------------------+-------------------+
| / | Hospital  | Radiology  |   Popeye Townsend, |                   |
|    | Encounter |            |  MD  [address] |                   |
|        |           |            |  St.  [place],   |                   |
|        |           |            | [place] [address]             |                   |
|        |           |            | [phone]         |                   |
|        |           |            | [phone] (Fax)   |                   |
+--------+-----------+------------+----------------------+-------------------+
| / | Surgery   | Radiology  |   Popeye Townsend, | CV EP PPM SYSTEM  |
|    |           |            |  MD  [address] | IMPLANT           |
|        |           |            |  St.  [place],   |                   |
|        |           |            | [place] [address]             |                   |
|        |           |            | [phone]         |                   |
|        |           |            | [phone] (Fax)   |                   |
+--------+-----------+------------+----------------------+-------------------+
| 02/10/ | Clinical  | Cardiology |                      |                   |
|    | Support   |            |                      |                   |
+--------+-----------+------------+----------------------+-------------------+
| / | Office    | Cardiology |   Tonia Wilson,    |                   |
|    | Visit     |            | ARNP  [address]   |                   |
|        |           |            | St  [place], [place]  |                   |
|        |           |            | [address]  [phone]  |                   |
|        |           |            |  [phone] (Fax)  |                   |
+--------+-----------+------------+----------------------+-------------------+
| / | Off-Site  | Nephrology |   Marzena Moser  |                   |
|    | Visit     |            | DO ETIENNE  [address]      |                   |
|        |           |            | [address]      |                   |
|        |           |            | [place]      |                   |
|        |           |            | [address]  [phone]  |                   |
|        |           |            |  [phone] (Fax)  |                   |
+--------+-----------+------------+----------------------+-------------------+
 
 
 
+--------------------+---------+--------+----------------------+---------------------+
| Name               | Type    | Priori | Associated Diagnoses | Order Schedule      |
|                    |         | ty     |                      |                     |
+--------------------+---------+--------+----------------------+---------------------+
| XR Hip Left 2 + Vw | Imaging | Routin |   Left hip pain      | 1 Occurrences       |
|                    |         | e      |                      | starting 01/15/2014 |
+--------------------+---------+--------+----------------------+---------------------+
 documented as of this encounter
 
 Procedures
 
 
+-------------------+--------+-------------+----------------------+----------------------+
| Procedure Name    | Priori | Date/Time   | Associated Diagnosis | Comments             |
|                   | ty     |             |                      |                      |
+-------------------+--------+-------------+----------------------+----------------------+
| XR KNEE LEFT 3 VW | Routin | 01/15/2014  |   Knee pain  Leg     |   Results for this   |
|                   | e      |  5:14 PM    | pain                 | procedure are in the |
|                   |        | PST         |                      |  results section.    |
+-------------------+--------+-------------+----------------------+----------------------+
 documented in this encounter
 
 
 Results
 XR Knee Left 3 Vw (01/15/2014  5:14 PM PST)
 
+----------+
| Specimen |
+----------+
|          |
+----------+
 
 
 
+------------------------------------------------------------------------+-----------------+
| Narrative                                                              | Performed At    |
+------------------------------------------------------------------------+-----------------+
|    [facility]      Diagnostic Imaging          |   [place]    |
| Department      [address] [place] [address]      [phone]    | [facility]        |
|                             [   rep [address]]     [   rep [facility]  |
| st zip]     **** Signed ****                                           | - IMAGING       |
| ______________________________________________________________________ |                 |
| ______________________     Patient Name: RAS HARDY   Physician:     |                 |
| .01     : 1945    Age: 68   Sex: F     Unit #: [id number]    |                 |
|   Exam Date: 01/15/14     Acct #: [id number]   [place] [facility] [place]     |                 |
|     Report #: [id number]                      Page:                     |                 |
|   %(RAD)RES..mtdd.print.filter("pg") of   %(RAD)                       |                 |
| RES..mtdd.print.filter("tpg")                                          |                 |
| ______________________________________________________________________ |                 |
| ______________________     Accession Number: [id number]                |                 |
| LEFT KNEE X-RAY              CLINICAL HISTORY:   LEFT KNEE PAIN, LEG   |                 |
| PAIN.               COMPARISON: None.               FINDINGS:   Three  |                 |
| views of the left knee were obtained.               There are no acute |                 |
|  osseous abnormalities. Diffuse osteopenia is present. There is a bone |                 |
|  island in   the proximal tibia. Moderate to severe lateral            |                 |
| compartment joint space loss is present. There is   chondrocalcinosis  |                 |
| of the medial and lateral menisci. The lateral meniscus appears to be  |                 |
| extruded. Mild   osteophytosis is present of the lateral compartment.  |                 |
| A moderate knee joint effusion is seen. Mild   atherosclerosis is      |                 |
| present.               Limited evaluation of the right knee            |                 |
| demonstrates hardware for arthroplasty as well as ORIF hardware   in   |                 |
| the femoral shaft.               IMPRESSION:       1. DEGENERATIVE     |                 |
| CHANGES OF THE LEFT KNEE AS DESCRIBED ABOVE INCLUDING MODERATE TO      |                 |
| SEVERE LATERAL   COMPARTMENT JOINT SPACE LOSS.               2.        |                 |
| MODERATE KNEE JOINT EFFUSION.               Dictated Date/Time:        |                 |
|   01/15/2014 17:14      Transcribed Date/Time:   01/15/2014 18:00      |                 |
|  :                         <<Signature on File>>  |                 |
|     -----------------------------------------------------------        |                 |
| Derek Reed MD01/15/14 2218     <Electronically signed by Derek Reed   |                 |
| MD>               Derek Reed MD 01/15/14 1714     :   |                 |
| [employer] Aaznqbngggdwe89/15/14 1800               Edgar Sanders MD    |                 |
|                                                                        |                 |
+------------------------------------------------------------------------+-----------------+
 
 
 
+----------------------+---------------------+--------------------+----------------+
| Performing           | Address             | City/State/Zipcode | Phone Number   |
| Organization         |                     |                    |                |
+----------------------+---------------------+--------------------+----------------+
|   [facility]     |   [address] | [place]    |   [phone] |
| [facility]  |                     | [address]              |                |
 
| - IMAGING            |                     |                    |                |
+----------------------+---------------------+--------------------+----------------+
 documented in this encounter
 
 Visit Diagnoses
 
 
+---------------------------------------------------------+
| Diagnosis                                               |
+---------------------------------------------------------+
|   Left hip pain  Pain in joint, pelvic region and thigh |
+---------------------------------------------------------+
|   Knee pain  Pain in joint, lower leg                   |
+---------------------------------------------------------+
|   Leg pain  Pain in limb                                |
+---------------------------------------------------------+
 documented in this encounter

## 2020-01-08 NOTE — XMS
Encounter Summary
  Created on: 2020
 
 Viviana Ricci
 External Reference #: [id number]
 : 47
 Sex: Female
 
 Demographics
 
 
+-----------------------+----------------------+
| Address               | [address]      |
|                       | [place]  [address] |
+-----------------------+----------------------+
| Home Phone            | [phone]      |
+-----------------------+----------------------+
| Preferred Language    | Unknown              |
+-----------------------+----------------------+
| Marital Status        | Single               |
+-----------------------+----------------------+
| Synagogue Affiliation | [id number]                 |
+-----------------------+----------------------+
| Race                  | Unknown              |
+-----------------------+----------------------+
| Ethnic Group          | Unknown              |
+-----------------------+----------------------+
 
 
 Author
 
 
+--------------+--------------------------------------------+
| Author       | [facility]  |
|              | and [facility]                                |
+--------------+--------------------------------------------+
| Organization | [facility]  |
|              | and [facility]                                |
+--------------+--------------------------------------------+
| Address      | Unknown                                    |
+--------------+--------------------------------------------+
| Phone        | Unavailable                                |
+--------------+--------------------------------------------+
 
 
 
 Support
 
 
+----------------+--------------+---------------------+-----------------+
| Name           | Relationship | Address             | Phone           |
+----------------+--------------+---------------------+-----------------+
| Ada/Ed Radhames | ECON         | [place]              | [phone] |
|                |              | [place]  |                 |
|                |              |  [address]              |                 |
+----------------+--------------+---------------------+-----------------+
 
 
 
 
 Care Team Providers
 
 
+-----------------------+------+-------------+
| Care Team Member Name | Role | Phone       |
+-----------------------+------+-------------+
 PCP  | Unavailable |
+-----------------------+------+-------------+
 
 
 
 Reason for Visit
 
 
+-------------------+----------+
| Reason            | Comments |
+-------------------+----------+
| Medication Refill |          |
+-------------------+----------+
 
 
 
 Encounter Details
 
 
+--------+--------+---------------------+----------------------+-------------------+
| Date   | Type   | Department          | Care Team            | Description       |
+--------+--------+---------------------+----------------------+-------------------+
| / | Refill |   [facility]         |   Marzena Moser  | Medication Refill |
|    |        | NEPHROLOGY  [address]   | M, DO  [address]      |                   |
|        |        | [address]   | [address]      |                   |
|        |        | [place], [place]     | [place], [place]      |                   |
|        |        | [address]          | [address]  [phone]  |                   |
|        |        | [phone]        |  [phone] (Fax)  |                   |
+--------+--------+---------------------+----------------------+-------------------+
 
 
 
 Social History
 
 
+--------------+-------+-----------+--------+------+
| Tobacco Use  | Types | Packs/Day | Years  | Date |
|              |       |           | Used   |      |
+--------------+-------+-----------+--------+------+
| Never Smoker |       |           |        |      |
+--------------+-------+-----------+--------+------+
 
 
 
+---------------------+---+---+---+
| Smokeless Tobacco:  |   |   |   |
| Never Used          |   |   |   |
+---------------------+---+---+---+
 
 
 
+---------------------------------------------------------------+
| Comments: some second hand smoke exposure, but fairly minimal |
 
+---------------------------------------------------------------+
 
 
 
+-------------+-------------+---------+----------+
| Alcohol Use | Drinks/Week | oz/Week | Comments |
+-------------+-------------+---------+----------+
| No          |             |         |          |
+-------------+-------------+---------+----------+
 
 
 
+------------------+---------------+
| Sex Assigned at  | Date Recorded |
| Birth            |               |
+------------------+---------------+
| Not on file      |               |
+------------------+---------------+
 
 
 
+----------------+-------------+-------------+
| Job Start Date | Occupation  | Industry    |
+----------------+-------------+-------------+
| Not on file    | Not on file | Not on file |
+----------------+-------------+-------------+
 
 
 
+----------------+--------------+------------+
| Travel History | Travel Start | Travel End |
+----------------+--------------+------------+
 
 
 
+-------------------------------------+
| No recent travel history available. |
+-------------------------------------+
 documented as of this encounter
 
 Plan of Treatment
 
 
+--------+-----------+------------+----------------------+-------------------+
| Date   | Type      | Specialty  | Care Team            | Description       |
+--------+-----------+------------+----------------------+-------------------+
| / | Office    | Cardiology |   HellalfredoTonia,    |                   |
|    | Visit     |            | ARNP  [address]   |                   |
|        |           |            | St  [place], [place]  |                   |
|        |           |            | [address]  [phone]  |                   |
|        |           |            |  [phone] (Fax)  |                   |
+--------+-----------+------------+----------------------+-------------------+
| / | Hospital  | Radiology  |   Popeye Townsend, |                   |
|    | Encounter |            |  MD  [address] |                   |
|        |           |            |  St.  [place],   |                   |
|        |           |            | [place] [address]             |                   |
|        |           |            | [phone]         |                   |
|        |           |            | [phone] (Fax)   |                   |
+--------+-----------+------------+----------------------+-------------------+
| / | Surgery   | Radiology  |   Popeye Townsend, | CV EP PPM SYSTEM  |
 
|    |           |            |  MD  [address] | IMPLANT           |
|        |           |            |  St.  [place],   |                   |
|        |           |            | [place] [address]             |                   |
|        |           |            | [phone]         |                   |
|        |           |            | [phone] (Fax)   |                   |
+--------+-----------+------------+----------------------+-------------------+
| 02/10/ | Clinical  | Cardiology |                      |                   |
|    | Support   |            |                      |                   |
+--------+-----------+------------+----------------------+-------------------+
| / | Office    | Cardiology |   Tonia Wilson,    |                   |
|    | Visit     |            | ARNP  [address]   |                   |
|        |           |            | [place]  |                   |
|        |           |            | [address]  [phone]  |                   |
|        |           |            |  [phone] (Fax)  |                   |
+--------+-----------+------------+----------------------+-------------------+
| / | Off-Site  | Nephrology |   Marzena Moser  |                   |
|    | Visit     |            | DO ETIENNE  [address] [place] [address]      |                   |
|        |           |            | [address]      |                   |
|        |           |            | [place]      |                   |
|        |           |            | [address]  [phone]  |                   |
|        |           |            |  [phone] (Fax)  |                   |
+--------+-----------+------------+----------------------+-------------------+
 documented as of this encounter
 
 Visit Diagnoses
 
 
+------------------------------------------------------------------------------------------+
| Diagnosis                                                                                |
+------------------------------------------------------------------------------------------+
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
| stage IV, or unspecified(403.90) - Primary  Unspecified hypertensive kidney disease with |
|  chronic kidney disease stage I through stage IV, or unspecified                         |
+------------------------------------------------------------------------------------------+
|   Complications of transplanted kidney                                                   |
+------------------------------------------------------------------------------------------+
|   DIABETES MELLITUS, TYPE II, UNCONTROLLED  Type II or unspecified type diabetes         |
| mellitus without mention of complication, uncontrolled                                   |
+------------------------------------------------------------------------------------------+
|   Hyperlipidemia  Other and unspecified hyperlipidemia                                   |
+------------------------------------------------------------------------------------------+
 documented in this encounter

## 2020-01-08 NOTE — XMS
Encounter Summary
  Created on: 2020
 
 Viviana Ricci
 External Reference #: [id number]
 : 47
 Sex: Female
 
 Demographics
 
 
+-----------------------+----------------------+
| Address               | [address]      |
|                       | [place]  [address] |
+-----------------------+----------------------+
| Home Phone            | [phone]      |
+-----------------------+----------------------+
| Preferred Language    | Unknown              |
+-----------------------+----------------------+
| Marital Status        | Single               |
+-----------------------+----------------------+
| Taoism Affiliation | [id number]                 |
+-----------------------+----------------------+
| Race                  | Unknown              |
+-----------------------+----------------------+
| Ethnic Group          | Unknown              |
+-----------------------+----------------------+
 
 
 Author
 
 
+--------------+--------------------------------------------+
| Author       | [facility]  |
|              | and [place]                                |
+--------------+--------------------------------------------+
| Organization | [facility]  |
|              | and [place]                                |
+--------------+--------------------------------------------+
| Address      | Unknown                                    |
+--------------+--------------------------------------------+
| Phone        | Unavailable                                |
+--------------+--------------------------------------------+
 
 
 
 Support
 
 
+----------------+--------------+---------------------+-----------------+
| Name           | Relationship | Address             | Phone           |
+----------------+--------------+---------------------+-----------------+
| Ada/Ed Radhames | ECON         | [place]              | [phone] |
|                |              | [place], [place]  |                 |
|                |              |  [address]              |                 |
+----------------+--------------+---------------------+-----------------+
 
 
 
 
 Care Team Providers
 
 
+-----------------------+------+-------------+
| Care Team Member Name | Role | Phone       |
+-----------------------+------+-------------+
 PCP  | Unavailable |
+-----------------------+------+-------------+
 
 
 
 Encounter Details
 
 
+--------+----------+----------------------+----------------+---------------------+
| Date   | Type     | Department           | Care Team      | Description         |
+--------+----------+----------------------+----------------+---------------------+
| / | Abstract |   [facility]          |   Offenstein,  | Asthma (Primary Dx) |
|    |          | PULMONARY  [address]     | Nena GUSMAN MD  |                     |
|        |          | [place], |                |                     |
|        |          |  [place] [address]       |                |                     |
|        |          | [phone]         |                |                     |
+--------+----------+----------------------+----------------+---------------------+
 
 
 
 Social History
 
 
+--------------+-------+-----------+--------+------+
| Tobacco Use  | Types | Packs/Day | Years  | Date |
|              |       |           | Used   |      |
+--------------+-------+-----------+--------+------+
| Never Smoker |       |           |        |      |
+--------------+-------+-----------+--------+------+
 
 
 
+---------------------+---+---+---+
| Smokeless Tobacco:  |   |   |   |
| Never Used          |   |   |   |
+---------------------+---+---+---+
 
 
 
+-------------+-------------+---------+----------+
| Alcohol Use | Drinks/Week | oz/Week | Comments |
+-------------+-------------+---------+----------+
| No          |             |         |          |
+-------------+-------------+---------+----------+
 
 
 
+------------------+---------------+
| Sex Assigned at  | Date Recorded |
| Birth            |               |
+------------------+---------------+
| Not on file      |               |
+------------------+---------------+
 
 
 
 
+----------------+-------------+-------------+
| Job Start Date | Occupation  | Industry    |
+----------------+-------------+-------------+
| Not on file    | Not on file | Not on file |
+----------------+-------------+-------------+
 
 
 
+----------------+--------------+------------+
| Travel History | Travel Start | Travel End |
+----------------+--------------+------------+
 
 
 
+-------------------------------------+
| No recent travel history available. |
+-------------------------------------+
 documented as of this encounter
 
 Plan of Treatment
 
 
+--------+-----------+------------+----------------------+-------------------+
| Date   | Type      | Specialty  | Care Team            | Description       |
+--------+-----------+------------+----------------------+-------------------+
| / | Office    | Cardiology |   Tonia Wilson,    |                   |
|    | Visit     |            | ARNP  [address]   |                   |
|        |           |            | [place]  |                   |
|        |           |            | [address] [phone]  |                   |
|        |           |            |  [phone] (Fax)  |                   |
+--------+-----------+------------+----------------------+-------------------+
| / | Hospital  | Radiology  |   Popeye Townsend, |                   |
|    | Encounter |            |  MD  [address] |                   |
|        |           |            |  [place]   |                   |
|        |           |            | [place] [address]             |                   |
|        |           |            | [phone]         |                   |
|        |           |            | [phone] (Fax)   |                   |
+--------+-----------+------------+----------------------+-------------------+
| / | Surgery   | Radiology  |   Popeye Townsend, | CV EP PPM SYSTEM  |
|    |           |            |  MD  [address] | IMPLANT           |
|        |           |            |  St.  [place],   |                   |
|        |           |            | [place] [address]             |                   |
|        |           |            | [phone]         |                   |
|        |           |            | [phone] (Fax)   |                   |
+--------+-----------+------------+----------------------+-------------------+
| 02/10/ | Clinical  | Cardiology |                      |                   |
|    | Support   |            |                      |                   |
+--------+-----------+------------+----------------------+-------------------+
| / | Office    | Cardiology |   Tonia Wilson,    |                   |
|    | Visit     |            | ARNJESSICA  [address]   |                   |
|        |           |            | St  [place]  |                   |
|        |           |            | [address]  [phone]  |                   |
|        |           |            |  [phone] (Fax)  |                   |
+--------+-----------+------------+----------------------+-------------------+
| / | Off-Site  | Nephrology |   Marzena Moser  |                   |
|    | Visit     |            | M,   [address]      |                   |
|        |           |            | [address]      |                   |
 
|        |           |            | [place]      |                   |
|        |           |            | [address]  [phone]  |                   |
|        |           |            |  [phone] (Fax)  |                   |
+--------+-----------+------------+----------------------+-------------------+
 documented as of this encounter
 
 Visit Diagnoses
 
 
+----------------------------------------+
| Diagnosis                              |
+----------------------------------------+
|   Asthma - Primary  Unspecified asthma |
+----------------------------------------+
 documented in this encounter

## 2020-01-08 NOTE — XMS
Encounter Summary
  Created on: 2020
 
 Viviana Ricci
 External Reference #: [id number]
 : 47
 Sex: Female
 
 Demographics
 
 
+-----------------------+----------------------+
| Address               | [address]      |
|                       | [place]  [address] |
+-----------------------+----------------------+
| Home Phone            | [phone]      |
+-----------------------+----------------------+
| Preferred Language    | Unknown              |
+-----------------------+----------------------+
| Marital Status        | Single               |
+-----------------------+----------------------+
| Evangelical Affiliation | 1009                 |
+-----------------------+----------------------+
| Race                  | Unknown              |
+-----------------------+----------------------+
| Ethnic Group          | Unknown              |
+-----------------------+----------------------+
 
 
 Author
 
 
+--------------+--------------------------------------------+
| Author       | [facility]  |
|              | and [place]                                |
+--------------+--------------------------------------------+
| Organization | [facility]  |
|              | and [place]                                |
+--------------+--------------------------------------------+
| Address      | Unknown                                    |
+--------------+--------------------------------------------+
| Phone        | Unavailable                                |
+--------------+--------------------------------------------+
 
 
 
 Support
 
 
+----------------+--------------+---------------------+-----------------+
| Name           | Relationship | Address             | Phone           |
+----------------+--------------+---------------------+-----------------+
| Ada/Ed Radhames | ECON         | [address]              | [phone] |
|                |              | [place]  |                 |
|                |              |  [address]              |                 |
+----------------+--------------+---------------------+-----------------+
 
 
 
 
 Care Team Providers
 
 
+------------------------+------+-----------------+
| Care Team Member Name  | Role | Phone           |
+------------------------+------+-----------------+
| Marzena Moser DO | PCP  | [phone] |
+------------------------+------+-----------------+
 
 
 
 Reason for Visit
 
 
+-------------------+----------+
| Reason            | Comments |
+-------------------+----------+
| Medication Refill |          |
+-------------------+----------+
 
 
 
 Encounter Details
 
 
+--------+--------+---------------------+----------------------+-------------------+
| Date   | Type   | Department          | Care Team            | Description       |
+--------+--------+---------------------+----------------------+-------------------+
| / | Refill |   [place]         |   Marzena Moser  | Medication Refill |
| 2017   |        | NEPHROLOGY  [address]   | M, DO  [address]      |                   |
|        |        | [address]   | [address]      |                   |
|        |        | [place], [place]     | [place], [place]      |                   |
|        |        | [address]          | [address]  [phone]  |                   |
|        |        | [phone]        |  [phone] (Fax)  |                   |
+--------+--------+---------------------+----------------------+-------------------+
 
 
 
 Social History
 
 
+--------------+-------+-----------+--------+------+
| Tobacco Use  | Types | Packs/Day | Years  | Date |
|              |       |           | Used   |      |
+--------------+-------+-----------+--------+------+
| Never Smoker |       |           |        |      |
+--------------+-------+-----------+--------+------+
 
 
 
+---------------------+---+---+---+
| Smokeless Tobacco:  |   |   |   |
| Never Used          |   |   |   |
+---------------------+---+---+---+
 
 
 
+---------------------------------------------------------------+
| Comments: some second hand smoke exposure, but fairly minimal |
 
+---------------------------------------------------------------+
 
 
 
+-------------+-------------+---------+----------+
| Alcohol Use | Drinks/Week | oz/Week | Comments |
+-------------+-------------+---------+----------+
| No          |             |         |          |
+-------------+-------------+---------+----------+
 
 
 
+------------------+---------------+
| Sex Assigned at  | Date Recorded |
| Birth            |               |
+------------------+---------------+
| Not on file      |               |
+------------------+---------------+
 
 
 
+----------------+-------------+-------------+
| Job Start Date | Occupation  | Industry    |
+----------------+-------------+-------------+
| Not on file    | Not on file | Not on file |
+----------------+-------------+-------------+
 
 
 
+----------------+--------------+------------+
| Travel History | Travel Start | Travel End |
+----------------+--------------+------------+
 
 
 
+-------------------------------------+
| No recent travel history available. |
+-------------------------------------+
 documented as of this encounter
 
 Plan of Treatment
 
 
+--------+-----------+------------+----------------------+-------------------+
| Date   | Type      | Specialty  | Care Team            | Description       |
+--------+-----------+------------+----------------------+-------------------+
| / | Office    | Cardiology |   Tonia Wilson,    |                   |
|    | Visit     |            | ARNP  [address]   |                   |
|        |           |            | St [place], [place]  |                   |
|        |           |            | [address]  [phone]  |                   |
|        |           |            |  [phone] (Fax)  |                   |
+--------+-----------+------------+----------------------+-------------------+
| / | Hospital  | Radiology  |   Popeye Townsend, |                   |
|    | Encounter |            |  MD  [address] |                   |
|        |           |            |  [place],   |                   |
|        |           |            | [place] [address]             |                   |
|        |           |            | [phone]         |                   |
|        |           |            | [phone] (Fax)   |                   |
+--------+-----------+------------+----------------------+-------------------+
| / | Surgery   | Radiology  |   Popeye Townsend, | CV EP PPM SYSTEM  |
 
|    |           |            |  MD  [address] | IMPLANT           |
|        |           |            |  [place],   |                   |
|        |           |            | [place] [address]             |                   |
|        |           |            | [phone]         |                   |
|        |           |            | [phone] (Fax)   |                   |
+--------+-----------+------------+----------------------+-------------------+
| 02/10/ | Clinical  | Cardiology |                      |                   |
|    | Support   |            |                      |                   |
+--------+-----------+------------+----------------------+-------------------+
| / | Office    | Cardiology |   RakeshTonia andre,    |                   |
|    | Visit     |            | ARNP  [address]   |                   |
|        |           |            | [place]  |                   |
|        |           |            | [address] [phone]  |                   |
|        |           |            |  [phone] (Fax)  |                   |
+--------+-----------+------------+----------------------+-------------------+
| / | Off-Site  | Nephrology |   Marzena Moser  |                   |
|    | Visit     |            | DO ETIENNE  [address] [place] [address]      |                   |
|        |           |            | [address]      |                   |
|        |           |            | [place]      |                   |
|        |           |            | [address] [phone]  |                   |
|        |           |            |  [phone] (Fax)  |                   |
+--------+-----------+------------+----------------------+-------------------+
 documented as of this encounter
 
 Visit Diagnoses
 Not on filedocumented in this encounter

## 2020-01-08 NOTE — XMS
Encounter Summary
  Created on: 2020
 
 Viviana Ricci
 External Reference #: [id number]
 : 47
 Sex: Female
 
 Demographics
 
 
+-----------------------+----------------------+
| Address               | [address]      |
|                       | [place]  [address] |
+-----------------------+----------------------+
| Home Phone            | [phone]      |
+-----------------------+----------------------+
| Preferred Language    | Unknown              |
+-----------------------+----------------------+
| Marital Status        | Single               |
+-----------------------+----------------------+
| Scientology Affiliation | [id number]                 |
+-----------------------+----------------------+
| Race                  | Unknown              |
+-----------------------+----------------------+
| Ethnic Group          | Unknown              |
+-----------------------+----------------------+
 
 
 Author
 
 
+--------------+--------------------------------------------+
| Author       | [facility]  |
|              | and [place]                                |
+--------------+--------------------------------------------+
| Organization | [facility]  |
|              | and [place]                                |
+--------------+--------------------------------------------+
| Address      | Unknown                                    |
+--------------+--------------------------------------------+
| Phone        | Unavailable                                |
+--------------+--------------------------------------------+
 
 
 
 Support
 
 
+----------------+--------------+---------------------+-----------------+
| Name           | Relationship | Address             | Phone           |
+----------------+--------------+---------------------+-----------------+
| Ada/Ed Radhames | ECON         | [address]              | [phone] |
|                |              | [place]  |                 |
|                |              |  [address]              |                 |
+----------------+--------------+---------------------+-----------------+
 
 
 
 
 Care Team Providers
 
 
+------------------------+------+-----------------+
| Care Team Member Name  | Role | Phone           |
+------------------------+------+-----------------+
| Marzena Moser DO | PCP  | [phone] |
+------------------------+------+-----------------+
 
 
 
 Encounter Details
 
 
+--------+----------+---------------------+----------------------+-------------+
| Date   | Type     | Department          | Care Team            | Description |
+--------+----------+---------------------+----------------------+-------------+
| / | Abstract |   [facility]         |   Marzena Moser  |             |
| 2019   |          | NEPHROLOGY  [address]   | DO [address]TI[address]  [address]      |             |
|        |          | [address]   | [address]      |             |
|        |          | [place], [place]     | [place], [place]      |             |
|        |          | [address]          | [address]  [phone]  |             |
|        |          | [phone]        |  [phone] (Fax)  |             |
+--------+----------+---------------------+----------------------+-------------+
 
 
 
 Social History
 
 
+--------------+-------+-----------+--------+------+
| Tobacco Use  | Types | Packs/Day | Years  | Date |
|              |       |           | Used   |      |
+--------------+-------+-----------+--------+------+
| Never Smoker |       |           |        |      |
+--------------+-------+-----------+--------+------+
 
 
 
+---------------------+---+---+---+
| Smokeless Tobacco:  |   |   |   |
| Never Used          |   |   |   |
+---------------------+---+---+---+
 
 
 
+---------------------------------------------------------------+
| Comments: some second hand smoke exposure, but fairly minimal |
+---------------------------------------------------------------+
 
 
 
+-------------+-------------+---------+----------+
| Alcohol Use | Drinks/Week | oz/Week | Comments |
+-------------+-------------+---------+----------+
| No          |             |         |          |
+-------------+-------------+---------+----------+
 
 
 
 
+------------------+---------------+
| Sex Assigned at  | Date Recorded |
| Birth            |               |
+------------------+---------------+
| Not on file      |               |
+------------------+---------------+
 
 
 
+----------------+-------------+-------------+
| Job Start Date | Occupation  | Industry    |
+----------------+-------------+-------------+
| Not on file    | Not on file | Not on file |
+----------------+-------------+-------------+
 
 
 
+----------------+--------------+------------+
| Travel History | Travel Start | Travel End |
+----------------+--------------+------------+
 
 
 
+-------------------------------------+
| No recent travel history available. |
+-------------------------------------+
 documented as of this encounter
 
 Progress Notes
 Juju Glass RN - 2019  9:16 AM PDTTacrolimus level 5.1, at goal. Viviana notified t
o continue with 1 mg every 12 hours, she verbalized understanding.Electronically signed by PITA Glass RN at 2019  9:23 AM PDTdocumented in this encounter
 
 Plan of Treatment
 
 
+--------+-----------+------------+----------------------+-------------------+
| Date   | Type      | Specialty  | Care Team            | Description       |
+--------+-----------+------------+----------------------+-------------------+
| / | Office    | Cardiology |   Tonia Wilson,    |                   |
|    | Visit     |            | ARNJESSICA  [address]   |                   |
|        |           |            | St [place], [place]  |                   |
|        |           |            | [address]  [phone]  |                   |
|        |           |            |  [phone] (Fax)  |                   |
+--------+-----------+------------+----------------------+-------------------+
| / | Hospital  | Radiology  |   Popeye Townsend, |                   |
|    | Encounter |            |  MD  [address] |                   |
|        |           |            |  St. [place],   |                   |
|        |           |            | [place] [address]             |                   |
|        |           |            | [phone]         |                   |
|        |           |            | [phone] (Fax)   |                   |
+--------+-----------+------------+----------------------+-------------------+
| / | Surgery   | Radiology  |   Popeye Townsend, | CV EP PPM SYSTEM  |
|    |           |            |  MD  [address] | IMPLANT           |
|        |           |            |  [place],   |                   |
|        |           |            | [place] [address]             |                   |
|        |           |            | [phone]         |                   |
|        |           |            | [phone] (Fax)   |                   |
+--------+-----------+------------+----------------------+-------------------+
 
| 02/10/ | Clinical  | Cardiology |                      |                   |
|    | Support   |            |                      |                   |
+--------+-----------+------------+----------------------+-------------------+
| / | Office    | Cardiology |   Tonia Wilson,    |                   |
|    | Visit     |            | [facility]  [address]   |                   |
|        |           |            | [place]  |                   |
|        |           |            | [address]  [phone]  |                   |
|        |           |            |  [phone] (Fax)  |                   |
+--------+-----------+------------+----------------------+-------------------+
| / | Off-Site  | Nephrology |   Marzena Moser  |                   |
|    | Visit     |            | DO ETIENNE  [address] [place] [address]      |                   |
|        |           |            | [address]      |                   |
|        |           |            | [place]      |                   |
|        |           |            | [address]  [phone]  |                   |
|        |           |            |  [phone] (Fax)  |                   |
+--------+-----------+------------+----------------------+-------------------+
 documented as of this encounter
 
 Procedures
 
 
+--------------------+--------+------------+----------------------+----------------------+
| Procedure Name     | Priori | Date/Time  | Associated Diagnosis | Comments             |
|                    | ty     |            |                      |                      |
+--------------------+--------+------------+----------------------+----------------------+
| TACROLIMUS TROUGH  | Routin | 2019 |                      |   Results for this   |
| LC-MS/MS           | e      |            |                      | procedure are in the |
|                    |        |            |                      |  results section.    |
+--------------------+--------+------------+----------------------+----------------------+
 documented in this encounter
 
 Results
 Tacrolimus Trough, LC-MS/MS (2019)
 
+-------------+-------+-----------+------------+--------------+
| Component   | Value | Ref Range | Performed  | Pathologist  |
|             |       |           | At         | Signature    |
+-------------+-------+-----------+------------+--------------+
| Tacrolimus, | 5.1   |           |            |              |
|  LC-MS/MS,  |       |           |            |              |
| External    |       |           |            |              |
+-------------+-------+-----------+------------+--------------+
 
 
 
+----------+
| Specimen |
+----------+
| Blood    |
+----------+
 documented in this encounter
 
 Visit Diagnoses
 Not on filedocumented in this encounter

## 2020-01-08 NOTE — XMS
Encounter Summary
  Created on: 2020
 
 Viviana Ricci
 External Reference #: [id number]
 : 47
 Sex: Female
 
 Demographics
 
 
+-----------------------+----------------------+
| Address               | [address]      |
|                       | [place]  [address] |
+-----------------------+----------------------+
| Home Phone            | [phone]      |
+-----------------------+----------------------+
| Preferred Language    | Unknown              |
+-----------------------+----------------------+
| Marital Status        | Single               |
+-----------------------+----------------------+
| Jehovah's witness Affiliation | [id number]                 |
+-----------------------+----------------------+
| Race                  | Unknown              |
+-----------------------+----------------------+
| Ethnic Group          | Unknown              |
+-----------------------+----------------------+
 
 
 Author
 
 
+--------------+--------------------------------------------+
| Author       | [facility]  |
|              | and [place]                                |
+--------------+--------------------------------------------+
| Organization | [facility]  |
|              | and [place]                                |
+--------------+--------------------------------------------+
| Address      | Unknown                                    |
+--------------+--------------------------------------------+
| Phone        | Unavailable                                |
+--------------+--------------------------------------------+
 
 
 
 Support
 
 
+----------------+--------------+---------------------+-----------------+
| Name           | Relationship | Address             | Phone           |
+----------------+--------------+---------------------+-----------------+
| Ada/Ed Radhames | ECON         | [address]              | [phone] |
|                |              | [place]  |                 |
|                |              |  [address]              |                 |
+----------------+--------------+---------------------+-----------------+
 
 
 
 
 Care Team Providers
 
 
+-----------------------+------+-------------+
| Care Team Member Name | Role | Phone       |
+-----------------------+------+-------------+
 PCP  | Unavailable |
+-----------------------+------+-------------+
 
 
 
 Reason for Visit
 
 
+--------+----------+
| Reason | Comments |
+--------+----------+
| Other  |          |
+--------+----------+
 
 
 
 Encounter Details
 
 
+--------+-----------+----------------------+----------------------+-------------+
| Date   | Type      | Department           | Care Team            | Description |
+--------+-----------+----------------------+----------------------+-------------+
| / | Telephone |   [facility] [place]          |   Maricruz Flanagan, | Other       |
|    |           | PULMONARY  [address]     |  RN                  |             |
|        |           | [address]  [place], |                      |             |
|        |           |  [place] [address]       |                      |             |
|        |           | [phone]         |                      |             |
+--------+-----------+----------------------+----------------------+-------------+
 
 
 
 Social History
 
 
+--------------+-------+-----------+--------+------+
| Tobacco Use  | Types | Packs/Day | Years  | Date |
|              |       |           | Used   |      |
+--------------+-------+-----------+--------+------+
| Never Smoker |       |           |        |      |
+--------------+-------+-----------+--------+------+
 
 
 
+---------------------+---+---+---+
| Smokeless Tobacco:  |   |   |   |
| Never Used          |   |   |   |
+---------------------+---+---+---+
 
 
 
+---------------------------------------------------------------+
| Comments: some second hand smoke exposure, but fairly minimal |
+---------------------------------------------------------------+
 
 
 
 
+-------------+-------------+---------+----------+
| Alcohol Use | Drinks/Week | oz/Week | Comments |
+-------------+-------------+---------+----------+
| No          |             |         |          |
+-------------+-------------+---------+----------+
 
 
 
+------------------+---------------+
| Sex Assigned at  | Date Recorded |
| Birth            |               |
+------------------+---------------+
| Not on file      |               |
+------------------+---------------+
 
 
 
+----------------+-------------+-------------+
| Job Start Date | Occupation  | Industry    |
+----------------+-------------+-------------+
| Not on file    | Not on file | Not on file |
+----------------+-------------+-------------+
 
 
 
+----------------+--------------+------------+
| Travel History | Travel Start | Travel End |
+----------------+--------------+------------+
 
 
 
+-------------------------------------+
| No recent travel history available. |
+-------------------------------------+
 documented as of this encounter
 
 Plan of Treatment
 
 
+--------+-----------+------------+----------------------+-------------------+
| Date   | Type      | Specialty  | Care Team            | Description       |
+--------+-----------+------------+----------------------+-------------------+
| / | Office    | Cardiology |   Tonia Wilson,    |                   |
|    | Visit     |            | ARNP  [address]   |                   |
|        |           |            | St  [place] [facility], [place]  |                   |
|        |           |            | [address]  [phone]  |                   |
|        |           |            |  [phone] (Fax)  |                   |
+--------+-----------+------------+----------------------+-------------------+
| / | Hospital  | Radiology  |   Popeye Townsend, |                   |
|    | Encounter |            |  MD  [address] |                   |
|        |           |            |  St.  [place],   |                   |
|        |           |            | [place] [address]             |                   |
|        |           |            | [phone]         |                   |
|        |           |            | [phone] (Fax)   |                   |
+--------+-----------+------------+----------------------+-------------------+
| / | Surgery   | Radiology  |   Popeye Townsend, | CV EP PPM SYSTEM  |
|    |           |            |  MD  [address] | IMPLANT           |
 
|        |           |            |  St.  [place],   |                   |
|        |           |            | [place] [address]             |                   |
|        |           |            | [phone]         |                   |
|        |           |            | [phone] (Fax)   |                   |
+--------+-----------+------------+----------------------+-------------------+
| 02/10/ | Clinical  | Cardiology |                      |                   |
|    | Support   |            |                      |                   |
+--------+-----------+------------+----------------------+-------------------+
| / | Office    | Cardiology |   Hellberg, Tonia,    |                   |
|    | Visit     |            | [facility]  [address]   |                   |
|        |           |            | [place]  |                   |
|        |           |            | [address]  [phone]  |                   |
|        |           |            |  [phone] (Fax)  |                   |
+--------+-----------+------------+----------------------+-------------------+
| / | Off-Site  | Nephrology |   Marzena Moser  |                   |
|    | Visit     |            | DO ETIENNE  [address] [place] [address]      |                   |
|        |           |            | [address]      |                   |
|        |           |            | [place]      |                   |
|        |           |            | [address] [phone]  |                   |
|        |           |            |  [phone] (Fax)  |                   |
+--------+-----------+------------+----------------------+-------------------+
 documented as of this encounter
 
 Visit Diagnoses
 Not on filedocumented in this encounter

## 2020-01-08 NOTE — XMS
Encounter Summary
  Created on: 2020
 
 Viviana Ricci
 External Reference #: [id number]
 : 47
 Sex: Female
 
 Demographics
 
 
+-----------------------+----------------------+
| Address               | [address]      |
|                       | [place]  [address] |
+-----------------------+----------------------+
| Home Phone            | [phone]      |
+-----------------------+----------------------+
| Preferred Language    | Unknown              |
+-----------------------+----------------------+
| Marital Status        | Single               |
+-----------------------+----------------------+
| Tenriism Affiliation | [id number]                 |
+-----------------------+----------------------+
| Race                  | Unknown              |
+-----------------------+----------------------+
| Ethnic Group          | Unknown              |
+-----------------------+----------------------+
 
 
 Author
 
 
+--------------+--------------------------------------------+
| Author       | [facility]  |
|              | and [place]                                |
+--------------+--------------------------------------------+
| Organization | [facility]  |
|              | and [place]                                |
+--------------+--------------------------------------------+
| Address      | Unknown                                    |
+--------------+--------------------------------------------+
| Phone        | Unavailable                                |
+--------------+--------------------------------------------+
 
 
 
 Support
 
 
+----------------+--------------+---------------------+-----------------+
| Name           | Relationship | Address             | Phone           |
+----------------+--------------+---------------------+-----------------+
| Ada/Ed Radhames | ECON         | [address]              | [phone] |
|                |              | [place]  |                 |
|                |              |  [address]              |                 |
+----------------+--------------+---------------------+-----------------+
 
 
 
 
 Care Team Providers
 
 
+------------------------+------+-----------------+
| Care Team Member Name  | Role | Phone           |
+------------------------+------+-----------------+
| Marzena Moser DO | PCP  | [phone] |
+------------------------+------+-----------------+
 
 
 
 Reason for Visit
 
 
+--------+---------------------------------------------+
| Reason | Comments                                    |
+--------+---------------------------------------------+
| Other  | stop metoprolol due to pauses / bradycardia |
+--------+---------------------------------------------+
 
 
 
 Encounter Details
 
 
+--------+-----------+----------------------+----------------------+--------------------+
| Date   | Type      | Department           | Care Team            | Description        |
+--------+-----------+----------------------+----------------------+--------------------+
| 10/24/ | Telephone |   [facility] [place]          |   Tonia Wilson,    | Other (stop        |
| 2019   |           | CARDIOLOGY  [address]    | ARNP  [address]   | metoprolol due to  |
|        |           | [address]  [place], | St  [facility] [place]  | pauses /           |
|        |           |  [place] [address]       | [address]  [phone]  | bradycardia)       |
|        |           | [phone]         |  [phone] (Fax)  |                    |
+--------+-----------+----------------------+----------------------+--------------------+
 
 
 
 Social History
 
 
+--------------+-------+-----------+--------+------+
| Tobacco Use  | Types | Packs/Day | Years  | Date |
|              |       |           | Used   |      |
+--------------+-------+-----------+--------+------+
| Never Smoker |       |           |        |      |
+--------------+-------+-----------+--------+------+
 
 
 
+---------------------+---+---+---+
| Smokeless Tobacco:  |   |   |   |
| Never Used          |   |   |   |
+---------------------+---+---+---+
 
 
 
+---------------------------------------------------------------+
| Comments: some second hand smoke exposure, but fairly minimal |
+---------------------------------------------------------------+
 
 
 
 
+-------------+-------------+---------+----------+
| Alcohol Use | Drinks/Week | oz/Week | Comments |
+-------------+-------------+---------+----------+
| No          |             |         |          |
+-------------+-------------+---------+----------+
 
 
 
+------------------+---------------+
| Sex Assigned at  | Date Recorded |
| Birth            |               |
+------------------+---------------+
| Not on file      |               |
+------------------+---------------+
 
 
 
+----------------+-------------+-------------+
| Job Start Date | Occupation  | Industry    |
+----------------+-------------+-------------+
| Not on file    | Not on file | Not on file |
+----------------+-------------+-------------+
 
 
 
+----------------+--------------+------------+
| Travel History | Travel Start | Travel End |
+----------------+--------------+------------+
 
 
 
+-------------------------------------+
| No recent travel history available. |
+-------------------------------------+
 documented as of this encounter
 
 Plan of Treatment
 
 
+--------+-----------+------------+----------------------+-------------------+
| Date   | Type      | Specialty  | Care Team            | Description       |
+--------+-----------+------------+----------------------+-------------------+
| / | Office    | Cardiology |   Tonia Wilson,    |                   |
|    | Visit     |            | ARNP  [address]   |                   |
|        |           |            | [place]  |                   |
|        |           |            | [address]  [phone]  |                   |
|        |           |            |  [phone] (Fax)  |                   |
+--------+-----------+------------+----------------------+-------------------+
| / | Hospital  | Radiology  |   Popeye Townsend, |                   |
|    | Encounter |            |  MD  [address] |                   |
|        |           |            |  [place],   |                   |
|        |           |            | [place] [address]             |                   |
|        |           |            | [phone]         |                   |
|        |           |            | [phone] (Fax)   |                   |
+--------+-----------+------------+----------------------+-------------------+
| / | Surgery   | Radiology  |   Popeye Townsend, | CV EP PPM SYSTEM  |
|    |           |            |  MD  [address] | IMPLANT           |
 
|        |           |            |  St.  [place],   |                   |
|        |           |            | [place] [address]             |                   |
|        |           |            | [phone]         |                   |
|        |           |            | [phone] (Fax)   |                   |
+--------+-----------+------------+----------------------+-------------------+
| 02/10/ | Clinical  | Cardiology |                      |                   |
|    | Support   |            |                      |                   |
+--------+-----------+------------+----------------------+-------------------+
| / | Office    | Cardiology |   Tonia Wilson,    |                   |
|    | Visit     |            | ARNP  [address]   |                   |
|        |           |            | [place]  |                   |
|        |           |            | [address]  [phone]  |                   |
|        |           |            |  [phone] (Fax)  |                   |
+--------+-----------+------------+----------------------+-------------------+
| / | Off-Site  | Nephrology |   Marzena Moser  |                   |
|    | Visit     |            | DO ETIENNE  [facility]      |                   |
|        |           |            | [address]      |                   |
|        |           |            | [place]      |                   |
|        |           |            | [address]  [phone]  |                   |
|        |           |            |  [phone] (Fax)  |                   |
+--------+-----------+------------+----------------------+-------------------+
 documented as of this encounter
 
 Visit Diagnoses
 Not on filedocumented in this encounter

## 2020-01-08 NOTE — XMS
Encounter Summary
  Created on: 2020
 
 Viviana Ricci
 External Reference #: [id number]
 : 47
 Sex: Female
 
 Demographics
 
 
+-----------------------+----------------------+
| Address               | [address]      |
|                       | [place]  [address] |
+-----------------------+----------------------+
| Home Phone            | [phone]      |
+-----------------------+----------------------+
| Preferred Language    | Unknown              |
+-----------------------+----------------------+
| Marital Status        | Single               |
+-----------------------+----------------------+
| Lutheran Affiliation | [id number]                 |
+-----------------------+----------------------+
| Race                  | Unknown              |
+-----------------------+----------------------+
| Ethnic Group          | Unknown              |
+-----------------------+----------------------+
 
 
 Author
 
 
+--------------+--------------------------------------------+
| Author       | [facility]  |
|              | and [place]                                |
+--------------+--------------------------------------------+
| Organization | [facility]  |
|              | and [place]                                |
+--------------+--------------------------------------------+
| Address      | Unknown                                    |
+--------------+--------------------------------------------+
| Phone        | Unavailable                                |
+--------------+--------------------------------------------+
 
 
 
 Support
 
 
+----------------+--------------+---------------------+-----------------+
| Name           | Relationship | Address             | Phone           |
+----------------+--------------+---------------------+-----------------+
| Ada/Ed Radhames | ECON         | [address]              | [phone] |
|                |              | [place]  |                 |
|                |              |  [address]              |                 |
+----------------+--------------+---------------------+-----------------+
 
 
 
 
 Care Team Providers
 
 
+------------------------+------+-----------------+
| Care Team Member Name  | Role | Phone           |
+------------------------+------+-----------------+
| Marzena Moser DO | PCP  | [phone] |
+------------------------+------+-----------------+
 
 
 
 Reason for Visit
 
 
+-------------+----------+
| Reason      | Comments |
+-------------+----------+
| Lab Results |          |
+-------------+----------+
 
 
 
 Encounter Details
 
 
+--------+-----------+---------------------+----------------------+-------------+
| Date   | Type      | Department          | Care Team            | Description |
+--------+-----------+---------------------+----------------------+-------------+
| / | Telephone |   [facility] [place]         |   Marzena Moser  | Lab Results |
| 2018   |           | NEPHROLOGY  [address]   | M, DO  [address]      |             |
|        |           | [address]   | [address]      |             |
|        |           | [place], [place]     | [place], [place]      |             |
|        |           | [address]          | [address]  [phone]  |             |
|        |           | [phone]        |  [phone] (Fax)  |             |
+--------+-----------+---------------------+----------------------+-------------+
 
 
 
 Social History
 
 
+--------------+-------+-----------+--------+------+
| Tobacco Use  | Types | Packs/Day | Years  | Date |
|              |       |           | Used   |      |
+--------------+-------+-----------+--------+------+
| Never Smoker |       |           |        |      |
+--------------+-------+-----------+--------+------+
 
 
 
+---------------------+---+---+---+
| Smokeless Tobacco:  |   |   |   |
| Never Used          |   |   |   |
+---------------------+---+---+---+
 
 
 
+---------------------------------------------------------------+
| Comments: some second hand smoke exposure, but fairly minimal |
 
+---------------------------------------------------------------+
 
 
 
+-------------+-------------+---------+----------+
| Alcohol Use | Drinks/Week | oz/Week | Comments |
+-------------+-------------+---------+----------+
| No          |             |         |          |
+-------------+-------------+---------+----------+
 
 
 
+------------------+---------------+
| Sex Assigned at  | Date Recorded |
| Birth            |               |
+------------------+---------------+
| Not on file      |               |
+------------------+---------------+
 
 
 
+----------------+-------------+-------------+
| Job Start Date | Occupation  | Industry    |
+----------------+-------------+-------------+
| Not on file    | Not on file | Not on file |
+----------------+-------------+-------------+
 
 
 
+----------------+--------------+------------+
| Travel History | Travel Start | Travel End |
+----------------+--------------+------------+
 
 
 
+-------------------------------------+
| No recent travel history available. |
+-------------------------------------+
 documented as of this encounter
 
 Plan of Treatment
 
 
+--------+-----------+------------+----------------------+-------------------+
| Date   | Type      | Specialty  | Care Team            | Description       |
+--------+-----------+------------+----------------------+-------------------+
| / | Office    | Cardiology |   Tonia Wilson,    |                   |
|    | Visit     |            | ARNJESSICA  [address]   |                   |
|        |           |            | St  [place], [place]  |                   |
|        |           |            | [address]  [phone]  |                   |
|        |           |            |  [phone] (Fax)  |                   |
+--------+-----------+------------+----------------------+-------------------+
| / | Hospital  | Radiology  |   Popeye Townsend, |                   |
|    | Encounter |            |  MD  [address] |                   |
|        |           |            |  St.  [place],   |                   |
|        |           |            | [place] [address]             |                   |
|        |           |            | [phone]         |                   |
|        |           |            | [phone] (Fax)   |                   |
+--------+-----------+------------+----------------------+-------------------+
| / | Surgery   | Radiology  |   Popeye Townsend, | CV EP PPM SYSTEM  |
 
|    |           |            |  MD  [address] | IMPLANT           |
|        |           |            |  St.  [place],   |                   |
|        |           |            | [place] [address]             |                   |
|        |           |            | [phone]         |                   |
|        |           |            | [phone] (Fax)   |                   |
+--------+-----------+------------+----------------------+-------------------+
| 02/10/ | Clinical  | Cardiology |                      |                   |
|    | Support   |            |                      |                   |
+--------+-----------+------------+----------------------+-------------------+
| / | Office    | Cardiology |   Tonia Wilson,    |                   |
|    | Visit     |            | ARNP  [address]   |                   |
|        |           |            | [place]  |                   |
|        |           |            | [address]  [phone]  |                   |
|        |           |            |  [phone] (Fax)  |                   |
+--------+-----------+------------+----------------------+-------------------+
| / | Off-Site  | Nephrology |   Marzena Moser  |                   |
|    | Visit     |            | M, DO  [address]      |                   |
|        |           |            | [address]      |                   |
|        |           |            | [place]      |                   |
|        |           |            | [address]  [phone]  |                   |
|        |           |            |  [phone] (Fax)  |                   |
+--------+-----------+------------+----------------------+-------------------+
 documented as of this encounter
 
 Visit Diagnoses
 Not on filedocumented in this encounter

## 2020-01-08 NOTE — XMS
Encounter Summary
  Created on: 2020
 
 Viviana Ricci
 External Reference #: [id number]
 : 47
 Sex: Female
 
 Demographics
 
 
+-----------------------+----------------------+
| Address               | [address]      |
|                       | [place]  [address] |
+-----------------------+----------------------+
| Home Phone            | [phone]      |
+-----------------------+----------------------+
| Preferred Language    | Unknown              |
+-----------------------+----------------------+
| Marital Status        | Single               |
+-----------------------+----------------------+
| Pentecostal Affiliation | [id number]                 |
+-----------------------+----------------------+
| Race                  | Unknown              |
+-----------------------+----------------------+
| Ethnic Group          | Unknown              |
+-----------------------+----------------------+
 
 
 Author
 
 
+--------------+--------------------------------------------+
| Author       | [facility]  |
|              | and [place]                                |
+--------------+--------------------------------------------+
| Organization | [facility]  |
|              | and [place]                                |
+--------------+--------------------------------------------+
| Address      | Unknown                                    |
+--------------+--------------------------------------------+
| Phone        | Unavailable                                |
+--------------+--------------------------------------------+
 
 
 
 Support
 
 
+----------------+--------------+---------------------+-----------------+
| Name           | Relationship | Address             | Phone           |
+----------------+--------------+---------------------+-----------------+
| Ada/Ed Radhames | ECON         | [address]              | [phone] |
|                |              | [place]  |                 |
|                |              |  [address]              |                 |
+----------------+--------------+---------------------+-----------------+
 
 
 
 
 Care Team Providers
 
 
+-----------------------+------+-------------+
| Care Team Member Name | Role | Phone       |
+-----------------------+------+-------------+
 PCP  | Unavailable |
+-----------------------+------+-------------+
 
 
 
 Encounter Details
 
 
+--------+-----------+----------------------+----------------------+-------------+
| Date   | Type      | Department           | Care Team            | Description |
+--------+-----------+----------------------+----------------------+-------------+
| / | [facility]  |   [facility] |   Marzena Moser  |             |
|    | Encounter |  MED CTR XRAY  [address] | M, DO  [address]      |             |
|        |           |  [place]       | [place]      |             |
|        |           | [place] [address] | [place]      |             |
|        |           |   [phone]       | [address] [phone]  |             |
|        |           |                      |  [phone] (Fax)  |             |
+--------+-----------+----------------------+----------------------+-------------+
 
 
 
 Social History
 
 
+----------------+-------+-----------+--------+------+
| Tobacco Use    | Types | Packs/Day | Years  | Date |
|                |       |           | Used   |      |
+----------------+-------+-----------+--------+------+
| Never Assessed |       |           |        |      |
+----------------+-------+-----------+--------+------+
 
 
 
+------------------+---------------+
| Sex Assigned at  | Date Recorded |
| Birth            |               |
+------------------+---------------+
| Not on file      |               |
+------------------+---------------+
 
 
 
+----------------+-------------+-------------+
| Job Start Date | Occupation  | Industry    |
+----------------+-------------+-------------+
| Not on file    | Not on file | Not on file |
+----------------+-------------+-------------+
 
 
 
+----------------+--------------+------------+
| Travel History | Travel Start | Travel End |
+----------------+--------------+------------+
 
 
 
 
+-------------------------------------+
| No recent travel history available. |
+-------------------------------------+
 documented as of this encounter
 
 Plan of Treatment
 
 
+--------+-----------+------------+----------------------+-------------------+
| Date   | Type      | Specialty  | Care Team            | Description       |
+--------+-----------+------------+----------------------+-------------------+
| / | Office    | Cardiology |   Tonia Wilson,    |                   |
|    | Visit     |            | ARNJESSICA  [address]   |                   |
|        |           |            | St  [place], [place]  |                   |
|        |           |            | [address]  [phone]  |                   |
|        |           |            |  [phone] (Fax)  |                   |
+--------+-----------+------------+----------------------+-------------------+
| / | Hospital  | Radiology  |   Popeye Townsend, |                   |
|    | Encounter |            |  MD  [address] |                   |
|        |           |            |  St. [place],   |                   |
|        |           |            | [place] [address]             |                   |
|        |           |            | [phone]         |                   |
|        |           |            | [phone] (Fax)   |                   |
+--------+-----------+------------+----------------------+-------------------+
| / | Surgery   | Radiology  |   Popeye Townsend, | CV EP PPM SYSTEM  |
|    |           |            |  MD  [address] | IMPLANT           |
|        |           |            |  [place],   |                   |
|        |           |            | [place] [address]             |                   |
|        |           |            | [phone]         |                   |
|        |           |            | [phone] (Fax)   |                   |
+--------+-----------+------------+----------------------+-------------------+
| 02/10/ | Clinical  | Cardiology |                      |                   |
|    | Support   |            |                      |                   |
+--------+-----------+------------+----------------------+-------------------+
| / | Office    | Cardiology |   Tonia Wilson,    |                   |
|    | Visit     |            | BELKIS  [address]   |                   |
|        |           |            | [place]  |                   |
|        |           |            | [address]  [phone]  |                   |
|        |           |            |  [phone] (Fax)  |                   |
+--------+-----------+------------+----------------------+-------------------+
| / | Off-Site  | Nephrology |   Marzena Moser  |                   |
|    | Visit     |            | DO ETIENNE  [address] [place] [address]      |                   |
|        |           |            | [address]      |                   |
|        |           |            | [place]      |                   |
|        |           |            | [address] [phone]  |                   |
|        |           |            |  [phone] (Fax)  |                   |
+--------+-----------+------------+----------------------+-------------------+
 documented as of this encounter
 
 Visit Diagnoses
 Not on filedocumented in this encounter

## 2020-01-08 NOTE — XMS
Encounter Summary
  Created on: 2020
 
 Viviana Ricci
 External Reference #: [id number]
 : 47
 Sex: Female
 
 Demographics
 
 
+-----------------------+----------------------+
| Address               | [address]      |
|                       | [place]  [address] |
+-----------------------+----------------------+
| Home Phone            | [phone]      |
+-----------------------+----------------------+
| Preferred Language    | Unknown              |
+-----------------------+----------------------+
| Marital Status        | Single               |
+-----------------------+----------------------+
| Nondenominational Affiliation | 1009                 |
+-----------------------+----------------------+
| Race                  | Unknown              |
+-----------------------+----------------------+
| Ethnic Group          | Unknown              |
+-----------------------+----------------------+
 
 
 Author
 
 
+--------------+--------------------------------------------+
| Author       | [facility] [place]  |
|              | and [place]                                |
+--------------+--------------------------------------------+
| Organization | [facility] [place]  |
|              | and [place]                                |
+--------------+--------------------------------------------+
| Address      | Unknown                                    |
+--------------+--------------------------------------------+
| Phone        | Unavailable                                |
+--------------+--------------------------------------------+
 
 
 
 Support
 
 
+----------------+--------------+---------------------+-----------------+
| Name           | Relationship | Address             | Phone           |
+----------------+--------------+---------------------+-----------------+
| Ada/Ed Radhames | ECON         | [address]              | [phone] |
|                |              | [place]  |                 |
|                |              |  [address]              |                 |
+----------------+--------------+---------------------+-----------------+
 
 
 
 
 Care Team Providers
 
 
+------------------------+------+-----------------+
| Care Team Member Name  | Role | Phone           |
+------------------------+------+-----------------+
| Marzena Moser DO | PCP  | [phone] |
+------------------------+------+-----------------+
 
 
 
 Reason for Visit
 
 
+--------+----------+
| Reason | Comments |
+--------+----------+
| Other  | oxygen   |
+--------+----------+
 
 
 
 Encounter Details
 
 
+--------+-----------+---------------------+----------------------+-----------------+
| Date   | Type      | Department          | Care Team            | Description     |
+--------+-----------+---------------------+----------------------+-----------------+
| / | Telephone |   [facility]         |   Marzena Moser  | Other (oxygen ) |
| 2019   |           | NEPHROLOGY  [address]   | M, DO  [address]      |                 |
|        |           | [address]   | [address]      |                 |
|        |           | [place], [place]     | [place], [place]      |                 |
|        |           | [address]          | [address]  [phone]  |                 |
|        |           | [phone]        |  [phone] (Fax)  |                 |
+--------+-----------+---------------------+----------------------+-----------------+
 
 
 
 Social History
 
 
+--------------+-------+-----------+--------+------+
| Tobacco Use  | Types | Packs/Day | Years  | Date |
|              |       |           | Used   |      |
+--------------+-------+-----------+--------+------+
| Never Smoker |       |           |        |      |
+--------------+-------+-----------+--------+------+
 
 
 
+---------------------+---+---+---+
| Smokeless Tobacco:  |   |   |   |
| Never Used          |   |   |   |
+---------------------+---+---+---+
 
 
 
+---------------------------------------------------------------+
| Comments: some second hand smoke exposure, but fairly minimal |
 
+---------------------------------------------------------------+
 
 
 
+-------------+-------------+---------+----------+
| Alcohol Use | Drinks/Week | oz/Week | Comments |
+-------------+-------------+---------+----------+
| No          |             |         |          |
+-------------+-------------+---------+----------+
 
 
 
+------------------+---------------+
| Sex Assigned at  | Date Recorded |
| Birth            |               |
+------------------+---------------+
| Not on file      |               |
+------------------+---------------+
 
 
 
+----------------+-------------+-------------+
| Job Start Date | Occupation  | Industry    |
+----------------+-------------+-------------+
| Not on file    | Not on file | Not on file |
+----------------+-------------+-------------+
 
 
 
+----------------+--------------+------------+
| Travel History | Travel Start | Travel End |
+----------------+--------------+------------+
 
 
 
+-------------------------------------+
| No recent travel history available. |
+-------------------------------------+
 documented as of this encounter
 
 Plan of Treatment
 
 
+--------+-----------+------------+----------------------+-------------------+
| Date   | Type      | Specialty  | Care Team            | Description       |
+--------+-----------+------------+----------------------+-------------------+
| / | Office    | Cardiology |   Tonia Wilson,    |                   |
|    | Visit     |            | ARNJESSICA  [address]   |                   |
|        |           |            | St  [place], [place]  |                   |
|        |           |            | [address]  [phone]  |                   |
|        |           |            |  [phone] (Fax)  |                   |
+--------+-----------+------------+----------------------+-------------------+
| / | Hospital  | Radiology  |   Popeye Townsend, |                   |
2020   | Encounter |            |  MD  [address] |                   |
|        |           |            |  St.  [place],   |                   |
|        |           |            | [place] [address]             |                   |
|        |           |            | [phone]         |                   |
|        |           |            | [phone] (Fax)   |                   |
+--------+-----------+------------+----------------------+-------------------+
| / | Surgery   | Radiology  |   Popeye Townsend, | CV EP PPM SYSTEM  |
 
|    |           |            |  MD  [address] | IMPLANT           |
|        |           |            |  St.  [place],   |                   |
|        |           |            | [place] [address]             |                   |
|        |           |            | [phone]         |                   |
|        |           |            | [phone] (Fax)   |                   |
+--------+-----------+------------+----------------------+-------------------+
| 02/10/ | Clinical  | Cardiology |                      |                   |
2020   | Support   |            |                      |                   |
+--------+-----------+------------+----------------------+-------------------+
| / | Office    | Cardiology |   Tonia Wilson,    |                   |
|    | Visit     |            | ARNP  [address]   |                   |
|        |           |            | [place]  |                   |
|        |           |            | [address]  [phone]  |                   |
|        |           |            |  [phone] (Fax)  |                   |
+--------+-----------+------------+----------------------+-------------------+
| / | Off-Site  | Nephrology |   Marzena Moser  |                   |
|    | Visit     |            | DO ETIENNE  [facility]      |                   |
|        |           |            | [address]      |                   |
|        |           |            | [place]      |                   |
|        |           |            | [address]  [phone]  |                   |
|        |           |            |  [phone] (Fax)  |                   |
+--------+-----------+------------+----------------------+-------------------+
 documented as of this encounter
 
 Visit Diagnoses
 Not on filedocumented in this encounter

## 2020-01-08 NOTE — XMS
Encounter Summary
  Created on: 2020
 
 Viviana Ricci
 External Reference #: [id number]
 : 47
 Sex: Female
 
 Demographics
 
 
+-----------------------+----------------------+
| Address               | [address]      |
|                       | [place]  [address] |
+-----------------------+----------------------+
| Home Phone            | [phone]      |
+-----------------------+----------------------+
| Preferred Language    | Unknown              |
+-----------------------+----------------------+
| Marital Status        | Single               |
+-----------------------+----------------------+
| Congregation Affiliation | 1009                 |
+-----------------------+----------------------+
| Race                  | Unknown              |
+-----------------------+----------------------+
| Ethnic Group          | Unknown              |
+-----------------------+----------------------+
 
 
 Author
 
 
+--------------+--------------------------------------------+
| Author       | [facility]  |
|              | and [place]                                |
+--------------+--------------------------------------------+
| Organization | [facility] [place]  |
|              | and [place]                                |
+--------------+--------------------------------------------+
| Address      | Unknown                                    |
+--------------+--------------------------------------------+
| Phone        | Unavailable                                |
+--------------+--------------------------------------------+
 
 
 
 Support
 
 
+----------------+--------------+---------------------+-----------------+
| Name           | Relationship | Address             | Phone           |
+----------------+--------------+---------------------+-----------------+
| Ada/Ed Radhames | ECON         | [address]              | [phone] |
|                |              | [place]  |                 |
|                |              |  [address]              |                 |
+----------------+--------------+---------------------+-----------------+
 
 
 
 
 Care Team Providers
 
 
+-----------------------+------+-------------+
| Care Team Member Name | Role | Phone       |
+-----------------------+------+-------------+
 PCP  | Unavailable |
+-----------------------+------+-------------+
 
 
 
 Encounter Details
 
 
+--------+-------------+---------------------+----------------------+----------------------+
| Date   | Type        | Department          | Care Team            | Description          |
+--------+-------------+---------------------+----------------------+----------------------+
| / | Orders Only |   [facility]         |   Marzena Moser  | Kidney replaced by   |
| 2016   |             | NEPHROLOGY  [address]   | M,   [address]      | transplant (Primary  |
|        |             | [address]   | [address]      | Dx); Diabetes        |
|        |             | [place], [place]     | [place], [place]      | mellitus type II,    |
|        |             | [address]          | [address]  [phone]  | uncontrolled (HCC);  |
|        |             | [phone]        |  [phone] (Fax)  | Other                |
|        |             |                     |                      | hyperlipidemia;      |
|        |             |                     |                      | Hypothyroidism due   |
|        |             |                     |                      | to acquired atrophy  |
|        |             |                     |                      | of thyroid           |
+--------+-------------+---------------------+----------------------+----------------------+
 
 
 
 Social History
 
 
+--------------+-------+-----------+--------+------+
| Tobacco Use  | Types | Packs/Day | Years  | Date |
|              |       |           | Used   |      |
+--------------+-------+-----------+--------+------+
| Never Smoker |       |           |        |      |
+--------------+-------+-----------+--------+------+
 
 
 
+---------------------+---+---+---+
| Smokeless Tobacco:  |   |   |   |
| Never Used          |   |   |   |
+---------------------+---+---+---+
 
 
 
+---------------------------------------------------------------+
| Comments: some second hand smoke exposure, but fairly minimal |
+---------------------------------------------------------------+
 
 
 
+-------------+-------------+---------+----------+
| Alcohol Use | Drinks/Week | oz/Week | Comments |
+-------------+-------------+---------+----------+
 
| No          |             |         |          |
+-------------+-------------+---------+----------+
 
 
 
+------------------+---------------+
| Sex Assigned at  | Date Recorded |
| Birth            |               |
+------------------+---------------+
| Not on file      |               |
+------------------+---------------+
 
 
 
+----------------+-------------+-------------+
| Job Start Date | Occupation  | Industry    |
+----------------+-------------+-------------+
| Not on file    | Not on file | Not on file |
+----------------+-------------+-------------+
 
 
 
+----------------+--------------+------------+
| Travel History | Travel Start | Travel End |
+----------------+--------------+------------+
 
 
 
+-------------------------------------+
| No recent travel history available. |
+-------------------------------------+
 documented as of this encounter
 
 Plan of Treatment
 
 
+--------+-----------+------------+----------------------+-------------------+
| Date   | Type      | Specialty  | Care Team            | Description       |
+--------+-----------+------------+----------------------+-------------------+
| / | Office    | Cardiology |   Tonia Wilson,    |                   |
| 2020   | Visit     |            | [address]   |                   |
|        |           |            | St  [place]  |                   |
|        |           |            | [address]  [phone]  |                   |
|        |           |            |  [phone] (Fax)  |                   |
+--------+-----------+------------+----------------------+-------------------+
| / | Hospital  | Radiology  |   Popeye Townsend, |                   |
|    | Encounter |            |  MD  [address] |                   |
|        |           |            |  St. [place],   |                   |
|        |           |            | [place] [address]             |                   |
|        |           |            | [phone]         |                   |
|        |           |            | [phone] (Fax)   |                   |
+--------+-----------+------------+----------------------+-------------------+
| / | Surgery   | Radiology  |   Popeye Townsend, | CV EP PPM SYSTEM  |
|    |           |            |  MD  [address] | IMPLANT           |
|        |           |            |  [place]. [place],   |                   |
|        |           |            | [place] [address]             |                   |
|        |           |            | [phone]         |                   |
|        |           |            | [phone] (Fax)   |                   |
+--------+-----------+------------+----------------------+-------------------+
| 02/10/ | Clinical  | Cardiology |                      |                   |
 
|    | Support   |            |                      |                   |
+--------+-----------+------------+----------------------+-------------------+
| / | Office    | Cardiology |   RakeshmaxxTonia fuentes,    |                   |
|    | Visit     |            | [facility]  [address]   |                   |
|        |           |            | St  [place]  |                   |
|        |           |            | [address]  [phone]  |                   |
|        |           |            |  [phone] (Fax)  |                   |
+--------+-----------+------------+----------------------+-------------------+
| / | Off-Site  | Nephrology |   Marzena Moser  |                   |
|    | Visit     |            | DO ETIENNE  [address] [place] [address]      |                   |
|        |           |            | [address]      |                   |
|        |           |            | [place], [place]      |                   |
|        |           |            | [address]  [phone]  |                   |
|        |           |            |  [phone] (Fax)  |                   |
+--------+-----------+------------+----------------------+-------------------+
 
 
 
+---------------------+------+--------+----------------------+----------------------+
| Name                | Type | Priori | Associated Diagnoses | Order Schedule       |
|                     |      | ty     |                      |                      |
+---------------------+------+--------+----------------------+----------------------+
| Phosphorus          | Lab  | Routin |   Kidney replaced by | every 90 days for 4  |
|                     |      | e      |  transplant          | Occurrences starting |
|                     |      |        |                      |  2016 until    |
|                     |      |        |                      | 2017           |
+---------------------+------+--------+----------------------+----------------------+
| Urinalysis, Reflex  | Lab  | Routin |   Kidney replaced by | every 90 days for 4  |
| Microscopic and/or  |      | e      |  transplant          | Occurrences starting |
| Culture             |      |        |                      |  2016 until    |
|                     |      |        |                      | 2017           |
+---------------------+------+--------+----------------------+----------------------+
| CBC with            | Lab  | Routin |   Kidney replaced by | every 90 days for 4  |
| Differential        |      | e      |  transplant          | Occurrences starting |
|                     |      |        |                      |  2016 until    |
|                     |      |        |                      | 2017           |
+---------------------+------+--------+----------------------+----------------------+
| Tacrolimus ()  | Lab  | Routin |   Kidney replaced by | monthly or prn for   |
| Level               |      | e      |  transplant          | 12 Occurrences       |
|                     |      |        |                      | starting 2016  |
|                     |      |        |                      | until 2017     |
+---------------------+------+--------+----------------------+----------------------+
| Magnesium           | Lab  | Routin |   Kidney replaced by | every 90 days for 4  |
|                     |      | e      |  transplant          | Occurrences starting |
|                     |      |        |                      |  2016 until    |
|                     |      |        |                      | 2017           |
+---------------------+------+--------+----------------------+----------------------+
| Hemoglobin A1C      | Lab  | Routin |   Kidney replaced by | every 90 days for 4  |
|                     |      | e      |  transplant          | Occurrences starting |
|                     |      |        | Diabetes mellitus    |  2016 until    |
|                     |      |        | type II,             | 2017           |
|                     |      |        | uncontrolled (HCC)   |                      |
+---------------------+------+--------+----------------------+----------------------+
| Lipid Profile       | Lab  | Routin |   Other              | every 90  days for 4 |
|                     |      | e      | hyperlipidemia       |  Occurrences         |
|                     |      |        |                      | starting 2016  |
|                     |      |        |                      | until 2017     |
+---------------------+------+--------+----------------------+----------------------+
| TSH                 | Lab  | Routin |   Hypothyroidism due | every 6 months for 2 |
|                     |      | e      |  to acquired atrophy |  Occurrences         |
 
|                     |      |        |  of thyroid          | starting 2016  |
|                     |      |        |                      | until 2017     |
+---------------------+------+--------+----------------------+----------------------+
 documented as of this encounter
 
 Visit Diagnoses
 
 
+----------------------------------------------------------------------------------------+
| Diagnosis                                                                              |
+----------------------------------------------------------------------------------------+
|   Kidney replaced by transplant - Primary                                              |
+----------------------------------------------------------------------------------------+
|   Diabetes mellitus type II, uncontrolled (HCC)  Type II or unspecified type diabetes  |
| mellitus without mention of complication, uncontrolled                                 |
+----------------------------------------------------------------------------------------+
|   Other hyperlipidemia                                                                 |
+----------------------------------------------------------------------------------------+
|   Hypothyroidism due to acquired atrophy of thyroid                                    |
+----------------------------------------------------------------------------------------+
 documented in this encounter

## 2020-01-08 NOTE — XMS
Encounter Summary
  Created on: 2020
 
 Viviana Ricci
 External Reference #: [id number]
 : 47
 Sex: Female
 
 Demographics
 
 
+-----------------------+----------------------+
| Address               | [address]      |
|                       | [place]  [address] |
+-----------------------+----------------------+
| Home Phone            | [phone]      |
+-----------------------+----------------------+
| Preferred Language    | Unknown              |
+-----------------------+----------------------+
| Marital Status        | Single               |
+-----------------------+----------------------+
| Denominational Affiliation | 1009                 |
+-----------------------+----------------------+
| Race                  | Unknown              |
+-----------------------+----------------------+
| Ethnic Group          | Unknown              |
+-----------------------+----------------------+
 
 
 Author
 
 
+--------------+--------------------------------------------+
| Author       | [facility] [place]  |
|              | and [place]                                |
+--------------+--------------------------------------------+
| Organization | [facility] [place]  |
|              | and [place]                                |
+--------------+--------------------------------------------+
| Address      | Unknown                                    |
+--------------+--------------------------------------------+
| Phone        | Unavailable                                |
+--------------+--------------------------------------------+
 
 
 
 Support
 
 
+----------------+--------------+---------------------+-----------------+
| Name           | Relationship | Address             | Phone           |
+----------------+--------------+---------------------+-----------------+
| Ada/Ed Radhames | ECON         | [address]              | [phone] |
|                |              | [place]  |                 |
|                |              |  [address]              |                 |
+----------------+--------------+---------------------+-----------------+
 
 
 
 
 Care Team Providers
 
 
+-----------------------+------+-------------+
| Care Team Member Name | Role | Phone       |
+-----------------------+------+-------------+
 PCP  | Unavailable |
+-----------------------+------+-------------+
 
 
 
 Reason for Visit
 
 
+---------+------------+
| Reason  | Comments   |
+---------+------------+
| Results | tacrolimus |
+---------+------------+
 
 
 
 Encounter Details
 
 
+--------+-----------+---------------------+----------------------+----------------------+
| Date   | Type      | Department          | Care Team            | Description          |
+--------+-----------+---------------------+----------------------+----------------------+
| / | Telephone |   [facility]         |   Marzena Moser  | Results (tacrolimus) |
|    |           | NEPHROLOGY  [address]   | M, DO  [address]      |                      |
|        |           | [address]   | [address]      |                      |
|        |           | [place], [place]     | [place], [place]      |                      |
|        |           | [address]          | [address]  [phone]  |                      |
|        |           | [phone]        |  [phone] (Fax)  |                      |
+--------+-----------+---------------------+----------------------+----------------------+
 
 
 
 Social History
 
 
+--------------+-------+-----------+--------+------+
| Tobacco Use  | Types | Packs/Day | Years  | Date |
|              |       |           | Used   |      |
+--------------+-------+-----------+--------+------+
| Never Smoker |       |           |        |      |
+--------------+-------+-----------+--------+------+
 
 
 
+---------------------+---+---+---+
| Smokeless Tobacco:  |   |   |   |
| Never Used          |   |   |   |
+---------------------+---+---+---+
 
 
 
+-------------+-------------+---------+----------+
| Alcohol Use | Drinks/Week | oz/Week | Comments |
 
+-------------+-------------+---------+----------+
| No          |             |         |          |
+-------------+-------------+---------+----------+
 
 
 
+------------------+---------------+
| Sex Assigned at  | Date Recorded |
| Birth            |               |
+------------------+---------------+
| Not on file      |               |
+------------------+---------------+
 
 
 
+----------------+-------------+-------------+
| Job Start Date | Occupation  | Industry    |
+----------------+-------------+-------------+
| Not on file    | Not on file | Not on file |
+----------------+-------------+-------------+
 
 
 
+----------------+--------------+------------+
| Travel History | Travel Start | Travel End |
+----------------+--------------+------------+
 
 
 
+-------------------------------------+
| No recent travel history available. |
+-------------------------------------+
 documented as of this encounter
 
 Plan of Treatment
 
 
+--------+-----------+------------+----------------------+-------------------+
| Date   | Type      | Specialty  | Care Team            | Description       |
+--------+-----------+------------+----------------------+-------------------+
| / | Office    | Cardiology |   Tonia Wilson,    |                   |
|    | Visit     |            | ARNJESSICA  [address]   |                   |
|        |           |            | St  [facility] [place]  |                   |
|        |           |            | [address]  [phone]  |                   |
|        |           |            |  [phone] (Fax)  |                   |
+--------+-----------+------------+----------------------+-------------------+
| / | Hospital  | Radiology  |   Popeye Townsend, |                   |
|    | Encounter |            |  MD  [address] |                   |
|        |           |            |  St.  [place],   |                   |
|        |           |            | [place] [address]             |                   |
|        |           |            | [phone]         |                   |
|        |           |            | [phone] (Fax)   |                   |
+--------+-----------+------------+----------------------+-------------------+
| / | Surgery   | Radiology  |   Popeye Townsend, | CV EP PPM SYSTEM  |
|    |           |            |  MD  [address] | IMPLANT           |
|        |           |            |  St.  [place],   |                   |
|        |           |            | [place] [address]             |                   |
|        |           |            | [phone]         |                   |
|        |           |            | [phone] (Fax)   |                   |
+--------+-----------+------------+----------------------+-------------------+
 
| 02/10/ | Clinical  | Cardiology |                      |                   |
|    | Support   |            |                      |                   |
+--------+-----------+------------+----------------------+-------------------+
| / | Office    | Cardiology |   Tonia Wilson,    |                   |
|    | Visit     |            | ARNP  [address]   |                   |
|        |           |            | St  [place], [place]  |                   |
|        |           |            | [address]  [phone]  |                   |
|        |           |            |  [phone] (Fax)  |                   |
+--------+-----------+------------+----------------------+-------------------+
| / | Off-Site  | Nephrology |   Marzena Moser  |                   |
|    | Visit     |            | DO ETIENNE  [address] [place] [address]      |                   |
|        |           |            | [address]      |                   |
|        |           |            | [place]      |                   |
|        |           |            | [address] [phone]  |                   |
|        |           |            |  [phone] (Fax)  |                   |
+--------+-----------+------------+----------------------+-------------------+
 documented as of this encounter
 
 Visit Diagnoses
 Not on filedocumented in this encounter

## 2020-01-08 NOTE — XMS
Encounter Summary
  Created on: 2020
 
 Viviana Ricci
 External Reference #: [id number]
 : 47
 Sex: Female
 
 Demographics
 
 
+-----------------------+----------------------+
| Address               | [address]      |
|                       | [place]  [address] |
+-----------------------+----------------------+
| Home Phone            | [phone]      |
+-----------------------+----------------------+
| Preferred Language    | Unknown              |
+-----------------------+----------------------+
| Marital Status        | Single               |
+-----------------------+----------------------+
| Restorationism Affiliation | [id number]                 |
+-----------------------+----------------------+
| Race                  | Unknown              |
+-----------------------+----------------------+
| Ethnic Group          | Unknown              |
+-----------------------+----------------------+
 
 
 Author
 
 
+--------------+--------------------------------------------+
| Author       | [facility]  |
|              | and [place]                                |
+--------------+--------------------------------------------+
| Organization | [facility]  |
|              | and [place]                                |
+--------------+--------------------------------------------+
| Address      | Unknown                                    |
+--------------+--------------------------------------------+
| Phone        | Unavailable                                |
+--------------+--------------------------------------------+
 
 
 
 Support
 
 
+----------------+--------------+---------------------+-----------------+
| Name           | Relationship | Address             | Phone           |
+----------------+--------------+---------------------+-----------------+
| Ada/Ed Radhames | ECON         | [address]              | [phone] |
|                |              | [place]  |                 |
|                |              |  [address]              |                 |
+----------------+--------------+---------------------+-----------------+
 
 
 
 
 Care Team Providers
 
 
+-----------------------+------+-------------+
| Care Team Member Name | Role | Phone       |
+-----------------------+------+-------------+
 PCP  | Unavailable |
+-----------------------+------+-------------+
 
 
 
 Encounter Details
 
 
+--------+-------------+---------------------+----------------------+----------------------+
| Date   | Type        | Department          | Care Team            | Description          |
+--------+-------------+---------------------+----------------------+----------------------+
| / | Orders Only |   [facility]         |   Marzena Moser  | Kidney replaced by   |
| 2018   |             | NEPHROLOGY  [address]   | M,   [address]      | transplant (Primary  |
|        |             | [address]   | [address]      | Dx); Type 2 DM with  |
|        |             | [place], [place]     | [place], [place]      | CKD stage 2 and      |
|        |             | [address]          | [address]  [phone]  | hypertension (HCC);  |
|        |             | [phone]        |  [phone] (Fax)  | Mixed hyperlipidemia |
+--------+-------------+---------------------+----------------------+----------------------+
 
 
 
 Social History
 
 
+--------------+-------+-----------+--------+------+
| Tobacco Use  | Types | Packs/Day | Years  | Date |
|              |       |           | Used   |      |
+--------------+-------+-----------+--------+------+
| Never Smoker |       |           |        |      |
+--------------+-------+-----------+--------+------+
 
 
 
+---------------------+---+---+---+
| Smokeless Tobacco:  |   |   |   |
| Never Used          |   |   |   |
+---------------------+---+---+---+
 
 
 
+---------------------------------------------------------------+
| Comments: some second hand smoke exposure, but fairly minimal |
+---------------------------------------------------------------+
 
 
 
+-------------+-------------+---------+----------+
| Alcohol Use | Drinks/Week | oz/Week | Comments |
+-------------+-------------+---------+----------+
| No          |             |         |          |
+-------------+-------------+---------+----------+
 
 
 
 
+------------------+---------------+
| Sex Assigned at  | Date Recorded |
| Birth            |               |
+------------------+---------------+
| Not on file      |               |
+------------------+---------------+
 
 
 
+----------------+-------------+-------------+
| Job Start Date | Occupation  | Industry    |
+----------------+-------------+-------------+
| Not on file    | Not on file | Not on file |
+----------------+-------------+-------------+
 
 
 
+----------------+--------------+------------+
| Travel History | Travel Start | Travel End |
+----------------+--------------+------------+
 
 
 
+-------------------------------------+
| No recent travel history available. |
+-------------------------------------+
 documented as of this encounter
 
 Progress Notes
 Bobbi Hanson RN - 2018 11:50 AM PDTNew standing lab order faxed to Skyler fajardo signed by Bobbi Hanson RN at 2018 11:57 AM PDTdocumented in this encou
nter
 
 Plan of Treatment
 
 
+--------+-----------+------------+----------------------+-------------------+
| Date   | Type      | Specialty  | Care Team            | Description       |
+--------+-----------+------------+----------------------+-------------------+
| / | Office    | Cardiology |   Tonia Wilson,    |                   |
|    | Visit     |            | ARNJESSICA  [address]   |                   |
|        |           |            | St  [place], [place]  |                   |
|        |           |            | [address]  [phone]  |                   |
|        |           |            |  [phone] (Fax)  |                   |
+--------+-----------+------------+----------------------+-------------------+
| / | Hospital  | Radiology  |   Popeye Townsend, |                   |
|    | Encounter |            |  MD  [address] |                   |
|        |           |            |  St.  [place],   |                   |
|        |           |            | [place] [address]             |                   |
|        |           |            | [phone]         |                   |
|        |           |            | [phone] (Fax)   |                   |
+--------+-----------+------------+----------------------+-------------------+
| / | Surgery   | Radiology  |   Popeye Townsend, | CV EP PPM SYSTEM  |
|    |           |            |  MD  [address] | IMPLANT           |
|        |           |            |  St.  [place],   |                   |
|        |           |            | [place] [address]             |                   |
|        |           |            | [phone]         |                   |
|        |           |            | [phone] (Fax)   |                   |
+--------+-----------+------------+----------------------+-------------------+
 
| 02/10/ | Clinical  | Cardiology |                      |                   |
|    | Support   |            |                      |                   |
+--------+-----------+------------+----------------------+-------------------+
| / | Office    | Cardiology |   RakeshmaxxalfredoTonia,    |                   |
|    | Visit     |            | ARNP  [address]   |                   |
|        |           |            | [place]  |                   |
|        |           |            | [address]  [phone]  |                   |
|        |           |            |  [phone] (Fax)  |                   |
+--------+-----------+------------+----------------------+-------------------+
| / | Off-Site  | Nephrology |   Marzena Moser  |                   |
|    | Visit     |            | DO ETIENNE  [address]      |                   |
|        |           |            | [address]      |                   |
|        |           |            | [place]      |                   |
|        |           |            | [address]  [phone]  |                   |
|        |           |            |  [phone] (Fax)  |                   |
+--------+-----------+------------+----------------------+-------------------+
 documented as of this encounter
 
 Visit Diagnoses
 
 
+-----------------------------------------------------+
| Diagnosis                                           |
+-----------------------------------------------------+
|   Kidney replaced by transplant - Primary           |
+-----------------------------------------------------+
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
+-----------------------------------------------------+
|   Mixed hyperlipidemia                              |
+-----------------------------------------------------+
 documented in this encounter

## 2020-01-08 NOTE — XMS
Encounter Summary
  Created on: 2020
 
 Viviana Ricci
 External Reference #: [id number]
 : 47
 Sex: Female
 
 Demographics
 
 
+-----------------------+----------------------+
| Address               | [address]      |
|                       | [place]  [address] |
+-----------------------+----------------------+
| Home Phone            | [phone]      |
+-----------------------+----------------------+
| Preferred Language    | Unknown              |
+-----------------------+----------------------+
| Marital Status        | Single               |
+-----------------------+----------------------+
| [employer] Affiliation | [id number]                 |
+-----------------------+----------------------+
| Race                  | Unknown              |
+-----------------------+----------------------+
| Ethnic Group          | Unknown              |
+-----------------------+----------------------+
 
 
 Author
 
 
+--------------+--------------------------------------------+
| Author       | [facility]  |
|              | and [place]                                |
+--------------+--------------------------------------------+
| Organization | [facility]  |
|              | and [place]                                |
+--------------+--------------------------------------------+
| Address      | Unknown                                    |
+--------------+--------------------------------------------+
| Phone        | Unavailable                                |
+--------------+--------------------------------------------+
 
 
 
 Support
 
 
+----------------+--------------+---------------------+-----------------+
| Name           | Relationship | Address             | Phone           |
+----------------+--------------+---------------------+-----------------+
| Ada/Ed Radhames | ECON         | [address]              | [phone] |
|                |              | [place]  |                 |
|                |              |  [address]              |                 |
+----------------+--------------+---------------------+-----------------+
 
 
 
 
 Care Team Providers
 
 
+-----------------------+------+-------------+
| Care Team Member Name | Role | Phone       |
+-----------------------+------+-------------+
 PCP  | Unavailable |
+-----------------------+------+-------------+
 
 
 
 Encounter Details
 
 
+--------+----------+---------------------+----------------------+-------------+
| Date   | Type     | Department          | Care Team            | Description |
+--------+----------+---------------------+----------------------+-------------+
| 07/10/ | Abstract |   [facility] [place]         |   Marzena Moser  |             |
|    |          | NEPHROLOGY  [address]   | M, DO  [address]      |             |
|        |          | [address]   | [address]      |             |
|        |          | [place], [place]     | [place]      |             |
|        |          | [address]          | [address]  [phone]  |             |
|        |          | [phone]        |  [phone] (Fax)  |             |
+--------+----------+---------------------+----------------------+-------------+
 
 
 
 Social History
 
 
+--------------+-------+-----------+--------+------+
| Tobacco Use  | Types | Packs/Day | Years  | Date |
|              |       |           | Used   |      |
+--------------+-------+-----------+--------+------+
| Never Smoker |       |           |        |      |
+--------------+-------+-----------+--------+------+
 
 
 
+---------------------+---+---+---+
| Smokeless Tobacco:  |   |   |   |
| Never Used          |   |   |   |
+---------------------+---+---+---+
 
 
 
+---------------------------------------------------------------+
| Comments: some second hand smoke exposure, but fairly minimal |
+---------------------------------------------------------------+
 
 
 
+-------------+-------------+---------+----------+
| Alcohol Use | Drinks/Week | oz/Week | Comments |
+-------------+-------------+---------+----------+
| No          |             |         |          |
+-------------+-------------+---------+----------+
 
 
 
 
+------------------+---------------+
| Sex Assigned at  | Date Recorded |
| Birth            |               |
+------------------+---------------+
| Not on file      |               |
+------------------+---------------+
 
 
 
+----------------+-------------+-------------+
| Job Start Date | Occupation  | Industry    |
+----------------+-------------+-------------+
| Not on file    | Not on file | Not on file |
+----------------+-------------+-------------+
 
 
 
+----------------+--------------+------------+
| Travel History | Travel Start | Travel End |
+----------------+--------------+------------+
 
 
 
+-------------------------------------+
| No recent travel history available. |
+-------------------------------------+
 documented as of this encounter
 
 Plan of Treatment
 
 
+--------+-----------+------------+----------------------+-------------------+
| Date   | Type      | Specialty  | Care Team            | Description       |
+--------+-----------+------------+----------------------+-------------------+
| / | Office    | Cardiology |   Tonia Wilson,    |                   |
|    | Visit     |            | ARNJESSICA  [address]   |                   |
|        |           |            | [place]  |                   |
|        |           |            | [address]  [phone]  |                   |
|        |           |            |  [phone] (Fax)  |                   |
+--------+-----------+------------+----------------------+-------------------+
| / | Hospital  | Radiology  |   Popeye Townsend, |                   |
|    | Encounter |            |  MD  [address] |                   |
|        |           |            |  St.  [place],   |                   |
|        |           |            | [place] [address]             |                   |
|        |           |            | [phone]         |                   |
|        |           |            | [phone] (Fax)   |                   |
+--------+-----------+------------+----------------------+-------------------+
| / | Surgery   | Radiology  |   Popeye Townsend, | CV EP PPM SYSTEM  |
|    |           |            |  MD  [address] | IMPLANT           |
|        |           |            |  St.  [place],   |                   |
|        |           |            | [place] [address]             |                   |
|        |           |            | [phone]         |                   |
|        |           |            | [phone] (Fax)   |                   |
+--------+-----------+------------+----------------------+-------------------+
| 02/10/ | Clinical  | Cardiology |                      |                   |
|    | Support   |            |                      |                   |
+--------+-----------+------------+----------------------+-------------------+
| / | Office    | Cardiology |   Tonia Wilson,    |                   |
|    | Visit     |            | ARNP  [address]   |                   |
 
|        |           |            | St  [place], [place]  |                   |
|        |           |            | [address]  [phone]  |                   |
|        |           |            |  [phone] (Fax)  |                   |
+--------+-----------+------------+----------------------+-------------------+
| / | Off-Site  | Nephrology |   Marzena Moser  |                   |
|    | Visit     |            | ETIENNE,   [address] [place] [address]      |                   |
|        |           |            | [address]      |                   |
|        |           |            | [place]      |                   |
|        |           |            | [address]  [phone]  |                   |
|        |           |            |  [phone] (Fax)  |                   |
+--------+-----------+------------+----------------------+-------------------+
 documented as of this encounter
 
 Procedures
 
 
+---------------------+--------+------------+----------------------+----------------------+
| Procedure Name      | Priori | Date/Time  | Associated Diagnosis | Comments             |
|                     | ty     |            |                      |                      |
+---------------------+--------+------------+----------------------+----------------------+
| TACROLIMUS ()  | Routin | 2014 |                      |   Results for this   |
| TROUGH              | e      |            |                      | procedure are in the |
|                     |        |            |                      |  results section.    |
+---------------------+--------+------------+----------------------+----------------------+
 documented in this encounter
 
 Results
 Tacrolimus () Level (2014)
 
+-------------+-------+-----------+-------------+--------------+
| Component   | Value | Ref Range | Performed   | Pathologist  |
|             |       |           | At          | Signature    |
+-------------+-------+-----------+-------------+--------------+
| Tacrolimus  | 5.5   |           | [facility]  |              |
| Level       |       |           | [facility]    |              |
|             |       |           | [facility]     |              |
|             |       |           | [facility] -    |              |
|             |       |           | LABORATORY  |              |
+-------------+-------+-----------+-------------+--------------+
 
 
 
+-----------------+
| Specimen        |
+-----------------+
| Blood specimen  |
| (specimen)      |
+-----------------+
 
 
 
+----------------------+--------------------+--------------------+----------------+
| Performing           | Address            | City/State/Zipcode | Phone Number   |
| Organization         |                    |                    |                |
+----------------------+--------------------+--------------------+----------------+
|   [facility]     |   [address] | [place]    |   [phone] |
| [facility]  |                    | [address]              |                |
| - LABORATORY         |                    |                    |                |
+----------------------+--------------------+--------------------+----------------+
|   [facility]     |   [address] | [place], [place]    |                |
 
| [facility]  |                    | [address]              |                |
| - LABORATORY         |                    |                    |                |
+----------------------+--------------------+--------------------+----------------+
 documented in this encounter
 
 Visit Diagnoses
 Not on filedocumented in this encounter

## 2020-01-08 NOTE — XMS
Encounter Summary
  Created on: 2020
 
 Viviana Ricci
 External Reference #: [id number]
 : 47
 Sex: Female
 
 Demographics
 
 
+-----------------------+----------------------+
| Address               | [address]      |
|                       | [place]  [address] |
+-----------------------+----------------------+
| Home Phone            | [phone]      |
+-----------------------+----------------------+
| Preferred Language    | Unknown              |
+-----------------------+----------------------+
| Marital Status        | Single               |
+-----------------------+----------------------+
| Muslim Affiliation | [id number]                 |
+-----------------------+----------------------+
| Race                  | Unknown              |
+-----------------------+----------------------+
| Ethnic Group          | Unknown              |
+-----------------------+----------------------+
 
 
 Author
 
 
+--------------+--------------------------------------------+
| Author       | [facility]  |
|              | and [place]                                |
+--------------+--------------------------------------------+
| Organization | [facility] [place]  |
|              | and [place]                                |
+--------------+--------------------------------------------+
| Address      | Unknown                                    |
+--------------+--------------------------------------------+
| Phone        | Unavailable                                |
+--------------+--------------------------------------------+
 
 
 
 Support
 
 
+----------------+--------------+---------------------+-----------------+
| Name           | Relationship | Address             | Phone           |
+----------------+--------------+---------------------+-----------------+
| Ada/Ed Radhames | ECON         | [address]              | [phone] |
|                |              | [place]  |                 |
|                |              |  [address]              |                 |
+----------------+--------------+---------------------+-----------------+
 
 
 
 
 Care Team Providers
 
 
+-----------------------+------+-------------+
| Care Team Member Name | Role | Phone       |
+-----------------------+------+-------------+
 PCP  | Unavailable |
+-----------------------+------+-------------+
 
 
 
 Encounter Details
 
 
+--------+-------------+---------------------+---------------------+----------------------+
| Date   | Type        | Department          | Care Team           | Description          |
+--------+-------------+---------------------+---------------------+----------------------+
| 11/15/ | Orders Only |   [facility]         |   Juju Glass,  | Type 2 diabetes      |
| 2017   |             | NEPHROLOGY  [address]   | RN                  | mellitus with        |
|        |             | [address]   |                     | complication, with   |
|        |             | [place], [place]     |                     | long-term current    |
|        |             | [address]          |                     | use of insulin (HCC) |
|        |             | [phone]        |                     |  (Primary Dx)        |
+--------+-------------+---------------------+---------------------+----------------------+
 
 
 
 Social History
 
 
+--------------+-------+-----------+--------+------+
| Tobacco Use  | Types | Packs/Day | Years  | Date |
|              |       |           | Used   |      |
+--------------+-------+-----------+--------+------+
| Never Smoker |       |           |        |      |
+--------------+-------+-----------+--------+------+
 
 
 
+---------------------+---+---+---+
| Smokeless Tobacco:  |   |   |   |
| Never Used          |   |   |   |
+---------------------+---+---+---+
 
 
 
+---------------------------------------------------------------+
| Comments: some second hand smoke exposure, but fairly minimal |
+---------------------------------------------------------------+
 
 
 
+-------------+-------------+---------+----------+
| Alcohol Use | Drinks/Week | oz/Week | Comments |
+-------------+-------------+---------+----------+
| No          |             |         |          |
+-------------+-------------+---------+----------+
 
 
 
 
+------------------+---------------+
| Sex Assigned at  | Date Recorded |
| Birth            |               |
+------------------+---------------+
| Not on file      |               |
+------------------+---------------+
 
 
 
+----------------+-------------+-------------+
| Job Start Date | Occupation  | Industry    |
+----------------+-------------+-------------+
| Not on file    | Not on file | Not on file |
+----------------+-------------+-------------+
 
 
 
+----------------+--------------+------------+
| Travel History | Travel Start | Travel End |
+----------------+--------------+------------+
 
 
 
+-------------------------------------+
| No recent travel history available. |
+-------------------------------------+
 documented as of this encounter
 
 Plan of Treatment
 
 
+--------+-----------+------------+----------------------+-------------------+
| Date   | Type      | Specialty  | Care Team            | Description       |
+--------+-----------+------------+----------------------+-------------------+
| / | Office    | Cardiology |   Tonia Wilson,    |                   |
|    | Visit     |            | [address]   |                   |
|        |           |            | [place]  |                   |
|        |           |            | [address]  [phone]  |                   |
|        |           |            |  [phone] (Fax)  |                   |
+--------+-----------+------------+----------------------+-------------------+
| / | Hospital  | Radiology  |   Popeye Townsend, |                   |
2020   | Encounter |            |  MD  [address] |                   |
|        |           |            |  St.  [place],   |                   |
|        |           |            | [place] [address]             |                   |
|        |           |            | [phone]         |                   |
|        |           |            | [phone] (Fax)   |                   |
+--------+-----------+------------+----------------------+-------------------+
| / | Surgery   | Radiology  |   Popeye Townsend, | CV EP PPM SYSTEM  |
|    |           |            |  MD  [address] | IMPLANT           |
|        |           |            |  St.  [place],   |                   |
|        |           |            | [place] [address]             |                   |
|        |           |            | [phone]         |                   |
|        |           |            | [phone] (Fax)   |                   |
+--------+-----------+------------+----------------------+-------------------+
| 02/10/ | Clinical  | Cardiology |                      |                   |
2020   | Support   |            |                      |                   |
+--------+-----------+------------+----------------------+-------------------+
| / | Office    | Cardiology |   Tonia Wilson,    |                   |
|    | Visit     |            | ARN  [address]   |                   |
 
|        |           |            |   [place]  |                   |
|        |           |            | [address]  [phone]  |                   |
|        |           |            |  [phone] (Fax)  |                   |
+--------+-----------+------------+----------------------+-------------------+
| / | Off-Site  | Nephrology |   Marzena Moser  |                   |
|    | Visit     |            | DO ETIENNE  [address]      |                   |
|        |           |            | [address]      |                   |
|        |           |            | [place]      |                   |
|        |           |            | [address]  [phone]  |                   |
|        |           |            |  [phone] (Fax)  |                   |
+--------+-----------+------------+----------------------+-------------------+
 documented as of this encounter
 
 Visit Diagnoses
 
 
+--------------------------------------------------------------------------------------+
| Diagnosis                                                                            |
+--------------------------------------------------------------------------------------+
|   Type 2 diabetes mellitus with complication, with long-term current use of insulin  |
| (HCC) - Primary                                                                      |
+--------------------------------------------------------------------------------------+
 documented in this encounter

## 2020-01-08 NOTE — XMS
Encounter Summary
  Created on: 2020
 
 Viviana Ricci
 External Reference #: [id number]
 : 47
 Sex: Female
 
 Demographics
 
 
+-----------------------+----------------------+
| Address               | [address]      |
|                       | [place]  [address] |
+-----------------------+----------------------+
| Home Phone            | [phone]      |
+-----------------------+----------------------+
| Preferred Language    | Unknown              |
+-----------------------+----------------------+
| Marital Status        | Single               |
+-----------------------+----------------------+
| Synagogue Affiliation | 1009                 |
+-----------------------+----------------------+
| Race                  | Unknown              |
+-----------------------+----------------------+
| Ethnic Group          | Unknown              |
+-----------------------+----------------------+
 
 
 Author
 
 
+--------------+--------------------------------------------+
| Author       | [facility] [place]  |
|              | and [place]                                |
+--------------+--------------------------------------------+
| Organization | [facility] [place]  |
|              | and [place]                                |
+--------------+--------------------------------------------+
| Address      | Unknown                                    |
+--------------+--------------------------------------------+
| Phone        | Unavailable                                |
+--------------+--------------------------------------------+
 
 
 
 Support
 
 
+----------------+--------------+---------------------+-----------------+
| Name           | Relationship | Address             | Phone           |
+----------------+--------------+---------------------+-----------------+
| Ada/Ed Radhames | ECON         | [address]              | [phone] |
|                |              | [place]  |                 |
|                |              |  [address]              |                 |
+----------------+--------------+---------------------+-----------------+
 
 
 
 
 Care Team Providers
 
 
+-----------------------+------+-------------+
| Care Team Member Name | Role | Phone       |
+-----------------------+------+-------------+
 PCP  | Unavailable |
+-----------------------+------+-------------+
 
 
 
 Reason for Visit
 
 
+-------------------+----------+
| Reason            | Comments |
+-------------------+----------+
| Medication Refill |          |
+-------------------+----------+
 
 
 
 Encounter Details
 
 
+--------+--------+---------------------+----------------------+-------------------+
| Date   | Type   | Department          | Care Team            | Description       |
+--------+--------+---------------------+----------------------+-------------------+
| / | Refill |   [place]         |   Marzena Moser  | Medication Refill |
|    |        | NEPHROLOGY  [address]   | M, DO  [address]      |                   |
|        |        | [address]   | [address]      |                   |
|        |        | [place], [place]     | [place], [place]      |                   |
|        |        | [address]          | [address]  [phone]  |                   |
|        |        | [phone]        |  [phone] (Fax)  |                   |
+--------+--------+---------------------+----------------------+-------------------+
 
 
 
 Social History
 
 
+--------------+-------+-----------+--------+------+
| Tobacco Use  | Types | Packs/Day | Years  | Date |
|              |       |           | Used   |      |
+--------------+-------+-----------+--------+------+
| Never Smoker |       |           |        |      |
+--------------+-------+-----------+--------+------+
 
 
 
+---------------------+---+---+---+
| Smokeless Tobacco:  |   |   |   |
| Never Used          |   |   |   |
+---------------------+---+---+---+
 
 
 
+---------------------------------------------------------------+
| Comments: some second hand smoke exposure, but fairly minimal |
 
+---------------------------------------------------------------+
 
 
 
+-------------+-------------+---------+----------+
| Alcohol Use | Drinks/Week | oz/Week | Comments |
+-------------+-------------+---------+----------+
| No          |             |         |          |
+-------------+-------------+---------+----------+
 
 
 
+------------------+---------------+
| Sex Assigned at  | Date Recorded |
| Birth            |               |
+------------------+---------------+
| Not on file      |               |
+------------------+---------------+
 
 
 
+----------------+-------------+-------------+
| Job Start Date | Occupation  | Industry    |
+----------------+-------------+-------------+
| Not on file    | Not on file | Not on file |
+----------------+-------------+-------------+
 
 
 
+----------------+--------------+------------+
| Travel History | Travel Start | Travel End |
+----------------+--------------+------------+
 
 
 
+-------------------------------------+
| No recent travel history available. |
+-------------------------------------+
 documented as of this encounter
 
 Plan of Treatment
 
 
+--------+-----------+------------+----------------------+-------------------+
| Date   | Type      | Specialty  | Care Team            | Description       |
+--------+-----------+------------+----------------------+-------------------+
| / | Office    | Cardiology |   HellalfredoTonia,    |                   |
|    | Visit     |            | ARNP  [address]   |                   |
|        |           |            | St  [place], [place]  |                   |
|        |           |            | [address]  [phone]  |                   |
|        |           |            |  [phone] (Fax)  |                   |
+--------+-----------+------------+----------------------+-------------------+
| / | Hospital  | Radiology  |   Popeye Townsend, |                   |
|    | Encounter |            |  MD  [address] |                   |
|        |           |            |  St.  [place],   |                   |
|        |           |            | [place] [address]             |                   |
|        |           |            | [phone]         |                   |
|        |           |            | [phone] (Fax)   |                   |
+--------+-----------+------------+----------------------+-------------------+
| / | Surgery   | Radiology  |   Popeye Townsend, | CV EP PPM SYSTEM  |
 
|    |           |            |  MD  [address] | IMPLANT           |
|        |           |            |  St.  [place],   |                   |
|        |           |            | [place] [address]             |                   |
|        |           |            | [phone]         |                   |
|        |           |            | [phone] (Fax)   |                   |
+--------+-----------+------------+----------------------+-------------------+
| 02/10/ | Clinical  | Cardiology |                      |                   |
|    | Support   |            |                      |                   |
+--------+-----------+------------+----------------------+-------------------+
| / | Office    | Cardiology |   Tonia Wilson,    |                   |
|    | Visit     |            | ARNP  [address]   |                   |
|        |           |            | [place]  |                   |
|        |           |            | [address]  [phone]  |                   |
|        |           |            |  [phone] (Fax)  |                   |
+--------+-----------+------------+----------------------+-------------------+
| / | Off-Site  | Nephrology |   Marzena Moser  |                   |
|    | Visit     |            | DO ETIENNE  [address] [place] [address]      |                   |
|        |           |            | [address]      |                   |
|        |           |            | [place]      |                   |
|        |           |            | [address]  [phone]  |                   |
|        |           |            |  [phone] (Fax)  |                   |
+--------+-----------+------------+----------------------+-------------------+
 documented as of this encounter
 
 Visit Diagnoses
 Not on filedocumented in this encounter

## 2020-01-08 NOTE — XMS
Encounter Summary
  Created on: 2020
 
 Viviana Ricci
 External Reference #: [id number]
 : 47
 Sex: Female
 
 Demographics
 
 
+-----------------------+----------------------+
| Address               | [address]      |
|                       | [place]  [address] |
+-----------------------+----------------------+
| Home Phone            | [phone]      |
+-----------------------+----------------------+
| Preferred Language    | Unknown              |
+-----------------------+----------------------+
| Marital Status        | Single               |
+-----------------------+----------------------+
| [employer] Affiliation | [id number]                 |
+-----------------------+----------------------+
| Race                  | Unknown              |
+-----------------------+----------------------+
| Ethnic Group          | Unknown              |
+-----------------------+----------------------+
 
 
 Author
 
 
+--------------+--------------------------------------------+
| Author       | [facility] [place]  |
|              | and [place]                                |
+--------------+--------------------------------------------+
| Organization | [facility] [place]  |
|              | and [place]                                |
+--------------+--------------------------------------------+
| Address      | Unknown                                    |
+--------------+--------------------------------------------+
| Phone        | Unavailable                                |
+--------------+--------------------------------------------+
 
 
 
 Support
 
 
+----------------+--------------+---------------------+-----------------+
| Name           | Relationship | Address             | Phone           |
+----------------+--------------+---------------------+-----------------+
| Ada/Ed Radhames | ECON         | [address]              | [phone] |
|                |              | [place]  |                 |
|                |              |  [address]              |                 |
+----------------+--------------+---------------------+-----------------+
 
 
 
 
 Care Team Providers
 
 
+-----------------------+------+-------------+
| Care Team Member Name | Role | Phone       |
+-----------------------+------+-------------+
 PCP  | Unavailable |
+-----------------------+------+-------------+
 
 
 
 Encounter Details
 
 
+--------+----------+---------------------+----------------------+-------------+
| Date   | Type     | Department          | Care Team            | Description |
+--------+----------+---------------------+----------------------+-------------+
| 06/15/ | Abstract |   [facility] [place]         |   Marzena Moser  |             |
| 2017   |          | NEPHROLOGY  [address]   | M, DO  [address]      |             |
|        |          | [address]   | [address]      |             |
|        |          | [place], [place]     | [place]      |             |
|        |          | [address]          | [address]  [phone]  |             |
|        |          | [phone]        |  [phone] (Fax)  |             |
+--------+----------+---------------------+----------------------+-------------+
 
 
 
 Social History
 
 
+--------------+-------+-----------+--------+------+
| Tobacco Use  | Types | Packs/Day | Years  | Date |
|              |       |           | Used   |      |
+--------------+-------+-----------+--------+------+
| Never Smoker |       |           |        |      |
+--------------+-------+-----------+--------+------+
 
 
 
+---------------------+---+---+---+
| Smokeless Tobacco:  |   |   |   |
| Never Used          |   |   |   |
+---------------------+---+---+---+
 
 
 
+---------------------------------------------------------------+
| Comments: some second hand smoke exposure, but fairly minimal |
+---------------------------------------------------------------+
 
 
 
+-------------+-------------+---------+----------+
| Alcohol Use | Drinks/Week | oz/Week | Comments |
+-------------+-------------+---------+----------+
| No          |             |         |          |
+-------------+-------------+---------+----------+
 
 
 
 
+------------------+---------------+
| Sex Assigned at  | Date Recorded |
| Birth            |               |
+------------------+---------------+
| Not on file      |               |
+------------------+---------------+
 
 
 
+----------------+-------------+-------------+
| Job Start Date | Occupation  | Industry    |
+----------------+-------------+-------------+
| Not on file    | Not on file | Not on file |
+----------------+-------------+-------------+
 
 
 
+----------------+--------------+------------+
| Travel History | Travel Start | Travel End |
+----------------+--------------+------------+
 
 
 
+-------------------------------------+
| No recent travel history available. |
+-------------------------------------+
 documented as of this encounter
 
 Progress Maite Perez - 06/15/2017  1:25 PM PDTOutside record:  Standing lab orders from [facility], [place].  Dos:  17.  Sent to scan.Electronically signed by Maite Raygoza at
 06/15/2017  1:26 PM PDTdocumented in this encounter
 
 Plan of Treatment
 
 
+--------+-----------+------------+----------------------+-------------------+
| Date   | Type      | Specialty  | Care Team            | Description       |
+--------+-----------+------------+----------------------+-------------------+
| / | Office    | Cardiology |   Tonia Wilson,    |                   |
|    | Visit     |            | ARN  [address]   |                   |
|        |           |            | St  [place]  |                   |
|        |           |            | [address]  [phone]  |                   |
|        |           |            |  [phone] (Fax)  |                   |
+--------+-----------+------------+----------------------+-------------------+
| / | Hospital  | Radiology  |   Popeye Townsend, |                   |
|    | Encounter |            |  MD  [address] |                   |
|        |           |            |  St.  [place],   |                   |
|        |           |            | [place] [address]             |                   |
|        |           |            | [phone]         |                   |
|        |           |            | [phone] (Fax)   |                   |
+--------+-----------+------------+----------------------+-------------------+
| / | Surgery   | Radiology  |   Popeye Townsend, | CV EP PPM SYSTEM  |
|    |           |            |  MD  [address] | IMPLANT           |
|        |           |            |  St.  [place],   |                   |
|        |           |            | [place] [address]             |                   |
|        |           |            | [phone]         |                   |
|        |           |            | [phone] (Fax)   |                   |
+--------+-----------+------------+----------------------+-------------------+
 
| 02/10/ | Clinical  | Cardiology |                      |                   |
|    | Support   |            |                      |                   |
+--------+-----------+------------+----------------------+-------------------+
| / | Office    | Cardiology |   Tonia Wilson,    |                   |
|    | Visit     |            | BELKIS  [address]   |                   |
|        |           |            | [place]  |                   |
|        |           |            | [address]  [phone]  |                   |
|        |           |            |  [phone] (Fax)  |                   |
+--------+-----------+------------+----------------------+-------------------+
| / | Off-Site  | Nephrology |   Marzena Moser  |                   |
2020   | Visit     |            | DO ETIENNE  [address] [place] [address]      |                   |
|        |           |            | [address]      |                   |
|        |           |            | [place]      |                   |
|        |           |            | [address]  [phone]  |                   |
|        |           |            |  [phone] (Fax)  |                   |
+--------+-----------+------------+----------------------+-------------------+
 documented as of this encounter
 
 Visit Diagnoses
 Not on filedocumented in this encounter

## 2020-01-08 NOTE — XMS
Encounter Summary
  Created on: 2020
 
 Viviana Ricci
 External Reference #: [id number]
 : 47
 Sex: Female
 
 Demographics
 
 
+-----------------------+----------------------+
| Address               | [address]      |
|                       | [place]  [address] |
+-----------------------+----------------------+
| Home Phone            | [phone]      |
+-----------------------+----------------------+
| Preferred Language    | Unknown              |
+-----------------------+----------------------+
| Marital Status        | Single               |
+-----------------------+----------------------+
| [employer] Affiliation | 1009                 |
+-----------------------+----------------------+
| Race                  | Unknown              |
+-----------------------+----------------------+
| Ethnic Group          | Unknown              |
+-----------------------+----------------------+
 
 
 Author
 
 
+--------------+--------------------------------------------+
| Author       | [facility] [place]  |
|              | and [place]                                |
+--------------+--------------------------------------------+
| Organization | [facility] [place]  |
|              | and [place]                                |
+--------------+--------------------------------------------+
| Address      | Unknown                                    |
+--------------+--------------------------------------------+
| Phone        | Unavailable                                |
+--------------+--------------------------------------------+
 
 
 
 Support
 
 
+----------------+--------------+---------------------+-----------------+
| Name           | Relationship | Address             | Phone           |
+----------------+--------------+---------------------+-----------------+
| Ada/Ed Radhames | ECON         | [address]              | [phone] |
|                |              | [place]  |                 |
|                |              |  [address]              |                 |
+----------------+--------------+---------------------+-----------------+
 
 
 
 
 Care Team Providers
 
 
+-----------------------+------+-------------+
| Care Team Member Name | Role | Phone       |
+-----------------------+------+-------------+
 PCP  | Unavailable |
+-----------------------+------+-------------+
 
 
 
 Encounter Details
 
 
+--------+----------+---------------------+----------------------+-------------+
| Date   | Type     | Department          | Care Team            | Description |
+--------+----------+---------------------+----------------------+-------------+
| / | Abstract |   [facility] [place]         |   Marzena Moser  |             |
|    |          | NEPHROLOGY  [address]   | M, DO  [address]      |             |
|        |          | [address]   | [address]      |             |
|        |          | [place], [place]     | [place]      |             |
|        |          | [address]          | [address]  [phone]  |             |
|        |          | [phone]        |  [phone] (Fax)  |             |
+--------+----------+---------------------+----------------------+-------------+
 
 
 
 Social History
 
 
+--------------+-------+-----------+--------+------+
| Tobacco Use  | Types | Packs/Day | Years  | Date |
|              |       |           | Used   |      |
+--------------+-------+-----------+--------+------+
| Never Smoker |       |           |        |      |
+--------------+-------+-----------+--------+------+
 
 
 
+---------------------+---+---+---+
| Smokeless Tobacco:  |   |   |   |
| Never Used          |   |   |   |
+---------------------+---+---+---+
 
 
 
+---------------------------------------------------------------+
| Comments: some second hand smoke exposure, but fairly minimal |
+---------------------------------------------------------------+
 
 
 
+-------------+-------------+---------+----------+
| Alcohol Use | Drinks/Week | oz/Week | Comments |
+-------------+-------------+---------+----------+
| No          |             |         |          |
+-------------+-------------+---------+----------+
 
 
 
 
+------------------+---------------+
| Sex Assigned at  | Date Recorded |
| Birth            |               |
+------------------+---------------+
| Not on file      |               |
+------------------+---------------+
 
 
 
+----------------+-------------+-------------+
| Job Start Date | Occupation  | Industry    |
+----------------+-------------+-------------+
| Not on file    | Not on file | Not on file |
+----------------+-------------+-------------+
 
 
 
+----------------+--------------+------------+
| Travel History | Travel Start | Travel End |
+----------------+--------------+------------+
 
 
 
+-------------------------------------+
| No recent travel history available. |
+-------------------------------------+
 documented as of this encounter
 
 Plan of Treatment
 
 
+--------+-----------+------------+----------------------+-------------------+
| Date   | Type      | Specialty  | Care Team            | Description       |
+--------+-----------+------------+----------------------+-------------------+
| / | Office    | Cardiology |   Tonia Wilson,    |                   |
|    | Visit     |            | ARNJESSICA  [address]   |                   |
|        |           |            | [place]  |                   |
|        |           |            | [address]  [phone]  |                   |
|        |           |            |  [phone] (Fax)  |                   |
+--------+-----------+------------+----------------------+-------------------+
| / | Hospital  | Radiology  |   Popeye Townsend, |                   |
|    | Encounter |            |  MD  [address] |                   |
|        |           |            |  St.  [place],   |                   |
|        |           |            | [place] [address]             |                   |
|        |           |            | [phone]         |                   |
|        |           |            | [phone] (Fax)   |                   |
+--------+-----------+------------+----------------------+-------------------+
| / | Surgery   | Radiology  |   Popeye Townsend, | CV EP PPM SYSTEM  |
|    |           |            |  MD  [address] | IMPLANT           |
|        |           |            |  St.  [place],   |                   |
|        |           |            | [place] [address]             |                   |
|        |           |            | [phone]         |                   |
|        |           |            | [phone] (Fax)   |                   |
+--------+-----------+------------+----------------------+-------------------+
| 02/10/ | Clinical  | Cardiology |                      |                   |
|    | Support   |            |                      |                   |
+--------+-----------+------------+----------------------+-------------------+
| / | Office    | Cardiology |   Tonia Wilson,    |                   |
|    | Visit     |            | ARNP  [address]   |                   |
 
|        |           |            | St  [place], [place]  |                   |
|        |           |            | [address]  [phone]  |                   |
|        |           |            |  [phone] (Fax)  |                   |
+--------+-----------+------------+----------------------+-------------------+
| / | Off-Site  | Nephrology |   Marzena Moser  |                   |
|    | Visit     |            | DO ETIENNE  [address] [place] [address]      |                   |
|        |           |            | [address]      |                   |
|        |           |            | [place]      |                   |
|        |           |            | [address]  [phone]  |                   |
|        |           |            |  [phone] (Fax)  |                   |
+--------+-----------+------------+----------------------+-------------------+
 documented as of this encounter
 
 Procedures
 
 
+----------------+--------+------------+----------------------+----------------------+
| Procedure Name | Priori | Date/Time  | Associated Diagnosis | Comments             |
|                | ty     |            |                      |                      |
+----------------+--------+------------+----------------------+----------------------+
| EXTERNAL LAB:  | Routin | 2015 |                      |   Results for this   |
| URINALYSIS     | e      |            |                      | procedure are in the |
|                |        |            |                      |  results section.    |
+----------------+--------+------------+----------------------+----------------------+
| URINALYSIS     | Routin | 2015 |                      |   Results for this   |
|                | e      |            |                      | procedure are in the |
|                |        |            |                      |  results section.    |
+----------------+--------+------------+----------------------+----------------------+
 documented in this encounter
 
 Results
 Urinalysis (2015)
 
+-----------+--------------------+------------+-------------+--------------+
| Component | Value              | Ref Range  | Performed   | Pathologist  |
|           |                    |            | At          | Signature    |
+-----------+--------------------+------------+-------------+--------------+
| WBC UA    | 50 (A)Comment: >50 | 0 - 4 /HPF | [facility]  |              |
|           |                    |            | [facility]    |              |
|           |                    |            | [facility]     |              |
|           |                    |            | [facility] -    |              |
|           |                    |            | LABORATORY  |              |
+-----------+--------------------+------------+-------------+--------------+
 
 
 
+-----------------+
| Specimen        |
+-----------------+
| Urine specimen  |
| (specimen)      |
+-----------------+
 
 
 
+----------------------+--------------------+--------------------+----------------+
| Performing           | Address            | City/State/Zipcode | Phone Number   |
| Organization         |                    |                    |                |
+----------------------+--------------------+--------------------+----------------+
|   [facility]     |   [address] | [place]    |   [phone] |
 
| [facility]  |                    | [address]              |                |
| - LABORATORY         |                    |                    |                |
+----------------------+--------------------+--------------------+----------------+
 External Lab: Urinalysis (2015)
 
+-------------+----------+-----------+------------+--------------+
| Component   | Value    | Ref Range | Performed  | Pathologist  |
|             |          |           | At         | Signature    |
+-------------+----------+-----------+------------+--------------+
| UA Blood,   | negative |           | REFERENCE  |              |
| External    |          |           | [facility]  |              |
|             |          |           | [facility]      |              |
|             |          |           | [facility]    |              |
|             |          |           | [facility]   |              |
+-------------+----------+-----------+------------+--------------+
| UA Glucose, | normal   |           | REFERENCE  |              |
|  External   |          |           | [facility]  |              |
|             |          |           | [facility]      |              |
|             |          |           | [facility]    |              |
|             |          |           | [facility]   |              |
+-------------+----------+-----------+------------+--------------+
| UA Ketones, | negative |           | REFERENCE  |              |
|  External   |          |           | [facility]  |              |
|             |          |           | [facility]      |              |
|             |          |           | [facility]    |              |
|             |          |           | [facility]   |              |
+-------------+----------+-----------+------------+--------------+
| UA Ph,      | 7        |           | REFERENCE  |              |
| External    |          |           | [facility]  |              |
|             |          |           | [facility]      |              |
|             |          |           | [facility]    |              |
|             |          |           | [facility]   |              |
+-------------+----------+-----------+------------+--------------+
| UA          | 75       |           | REFERENCE  |              |
| Proteins,   |          |           | LAB [facility]  |              |
| External    |          |           | [facility]      |              |
|             |          |           | [facility]    |              |
|             |          |           | [facility]   |              |
+-------------+----------+-----------+------------+--------------+
| UA RBC,     | 0        |           | REFERENCE  |              |
| External    |          |           | LAB [facility]  |              |
|             |          |           | [facility]      |              |
|             |          |           | [facility]    |              |
|             |          |           | [facility]OS[facility]   |              |
+-------------+----------+-----------+------------+--------------+
| UA Specific | 1.013    |           | REFERENCE  |              |
|  Gravity,   |          |           | LAB [facility]  |              |
| External    |          |           | [facility]      |              |
|             |          |           | [facility]    |              |
|             |          |           | [facility]OSPITAL   |              |
+-------------+----------+-----------+------------+--------------+
| UA          | 500      |           | REFERENCE  |              |
| Leukocyte   |          |           | LAB [facility]  |              |
| Esterase,   |          |           | [facility]      |              |
| External    |          |           | [facility]    |              |
|             |          |           | HOSPITAL   |              |
+-------------+----------+-----------+------------+--------------+
 
 
 
 
+----------------------+---------+--------------------+--------------+
| Performing           | Address | City/State/Zipcode | Phone Number |
| Organization         |         |                    |              |
+----------------------+---------+--------------------+--------------+
|   [facility]      |         |                    |              |
| [facility]  |         |                    |              |
| HOSPITAL             |         |                    |              |
+----------------------+---------+--------------------+--------------+
 documented in this encounter
 
 Visit Diagnoses
 Not on filedocumented in this encounter

## 2020-01-08 NOTE — XMS
Encounter Summary
  Created on: 2020
 
 Viviana Ricci
 External Reference #: [id number]
 : 47
 Sex: Female
 
 Demographics
 
 
+-----------------------+----------------------+
| Address               | [address]      |
|                       | [place]  [address] |
+-----------------------+----------------------+
| Home Phone            | [phone]      |
+-----------------------+----------------------+
| Preferred Language    | Unknown              |
+-----------------------+----------------------+
| Marital Status        | Single               |
+-----------------------+----------------------+
| Anabaptist Affiliation | [id number]                 |
+-----------------------+----------------------+
| Race                  | Unknown              |
+-----------------------+----------------------+
| Ethnic Group          | Unknown              |
+-----------------------+----------------------+
 
 
 Author
 
 
+--------------+--------------------------------------------+
| Author       | [facility]  |
|              | and [facility]                                |
+--------------+--------------------------------------------+
| Organization | [facility]  |
|              | and [facility]                                |
+--------------+--------------------------------------------+
| Address      | Unknown                                    |
+--------------+--------------------------------------------+
| Phone        | Unavailable                                |
+--------------+--------------------------------------------+
 
 
 
 Support
 
 
+----------------+--------------+---------------------+-----------------+
| Name           | Relationship | Address             | Phone           |
+----------------+--------------+---------------------+-----------------+
| Ada/Ed Radhames | ECON         | [place]              | [phone] |
|                |              | [place]  |                 |
|                |              |  [id number]              |                 |
+----------------+--------------+---------------------+-----------------+
 
 
 
 
 Care Team Providers
 
 
+-----------------------+------+-------------+
| Care Team Member Name | Role | Phone       |
+-----------------------+------+-------------+
 PCP  | Unavailable |
+-----------------------+------+-------------+
 
 
 
 Encounter Details
 
 
+--------+----------+---------------------+----------------------+-------------+
| Date   | Type     | Department          | Care Team            | Description |
+--------+----------+---------------------+----------------------+-------------+
| / | Abstract |   [facility]         |   Marzena Moser  |             |
|    |          | NEPHROLOGY  [address]   | M, DO  [address]      |             |
|        |          | [address]   | [address]      |             |
|        |          | [place], [place]     | [place]      |             |
|        |          | [address]          | [address]  [phone]  |             |
|        |          | [phone]        |  [phone] (Fax)  |             |
+--------+----------+---------------------+----------------------+-------------+
 
 
 
 Social History
 
 
+--------------+-------+-----------+--------+------+
| Tobacco Use  | Types | Packs/Day | Years  | Date |
|              |       |           | Used   |      |
+--------------+-------+-----------+--------+------+
| Never Smoker |       |           |        |      |
+--------------+-------+-----------+--------+------+
 
 
 
+---------------------+---+---+---+
| Smokeless Tobacco:  |   |   |   |
| Never Used          |   |   |   |
+---------------------+---+---+---+
 
 
 
+---------------------------------------------------------------+
| Comments: some second hand smoke exposure, but fairly minimal |
+---------------------------------------------------------------+
 
 
 
+-------------+-------------+---------+----------+
| Alcohol Use | Drinks/Week | oz/Week | Comments |
+-------------+-------------+---------+----------+
| No          |             |         |          |
+-------------+-------------+---------+----------+
 
 
 
 
+------------------+---------------+
| Sex Assigned at  | Date Recorded |
| Birth            |               |
+------------------+---------------+
| Not on file      |               |
+------------------+---------------+
 
 
 
+----------------+-------------+-------------+
| Job Start Date | Occupation  | Industry    |
+----------------+-------------+-------------+
| Not on file    | Not on file | Not on file |
+----------------+-------------+-------------+
 
 
 
+----------------+--------------+------------+
| Travel History | Travel Start | Travel End |
+----------------+--------------+------------+
 
 
 
+-------------------------------------+
| No recent travel history available. |
+-------------------------------------+
 documented as of this encounter
 
 Plan of Treatment
 
 
+--------+-----------+------------+----------------------+-------------------+
| Date   | Type      | Specialty  | Care Team            | Description       |
+--------+-----------+------------+----------------------+-------------------+
| / | Office    | Cardiology |   Tonia Wilson,    |                   |
|    | Visit     |            | ARNJESSICA  [address]   |                   |
|        |           |            | [place]  |                   |
|        |           |            | [address]  [phone]  |                   |
|        |           |            |  [phone] (Fax)  |                   |
+--------+-----------+------------+----------------------+-------------------+
| / | Hospital  | Radiology  |   Popeye Townsend, |                   |
|    | Encounter |            |  MD  [address] |                   |
|        |           |            |  St.  [place],   |                   |
|        |           |            | [place] [address]             |                   |
|        |           |            | [phone]         |                   |
|        |           |            | [phone] (Fax)   |                   |
+--------+-----------+------------+----------------------+-------------------+
| / | Surgery   | Radiology  |   Popeye Townsend, | CV EP PPM SYSTEM  |
|    |           |            |  MD  [address] | IMPLANT           |
|        |           |            |  St.  [place],   |                   |
|        |           |            | [place] [address]             |                   |
|        |           |            | [phone]         |                   |
|        |           |            | [phone] (Fax)   |                   |
+--------+-----------+------------+----------------------+-------------------+
| 02/10/ | Clinical  | Cardiology |                      |                   |
|    | Support   |            |                      |                   |
+--------+-----------+------------+----------------------+-------------------+
| / | Office    | Cardiology |   Tonia Wilson,    |                   |
|    | Visit     |            | ARNP  [address]   |                   |
 
|        |           |            | St  [place], [place]  |                   |
|        |           |            | [address]  [phone]  |                   |
|        |           |            |  [phone] (Fax)  |                   |
+--------+-----------+------------+----------------------+-------------------+
| / | Off-Site  | Nephrology |   Marzena Moser  |                   |
|    | Visit     |            | DO ETIENNE  [address] [place] [address]      |                   |
|        |           |            | [address]      |                   |
|        |           |            | [place]      |                   |
|        |           |            | [address]  [phone]  |                   |
|        |           |            |  [phone] (Fax)  |                   |
+--------+-----------+------------+----------------------+-------------------+
 documented as of this encounter
 
 Procedures
 
 
+------------------+--------+------------+----------------------+----------------------+
| Procedure Name   | Priori | Date/Time  | Associated Diagnosis | Comments             |
|                  | ty     |            |                      |                      |
+------------------+--------+------------+----------------------+----------------------+
| CULTURE, URINE,  | Routin | 2018 |                      |   Results for this   |
| REFLEXIVE        | e      |            |                      | procedure are in the |
|                  |        |            |                      |  results section.    |
+------------------+--------+------------+----------------------+----------------------+
 documented in this encounter
 
 Results
 Culture, Urine, Reflexive (2018)
 
+-------------+------------------------+-----------+------------+--------------+
| Component   | Value                  | Ref Range | Performed  | Pathologist  |
|             |                        |           | At         | Signature    |
+-------------+------------------------+-----------+------------+--------------+
| Urine       | >[id number]:        |           |            |              |
| Culture,    | Klebsiella pneumoniae  |           |            |              |
| Comprehensi |                        |           |            |              |
| ve          |                        |           |            |              |
+-------------+------------------------+-----------+------------+--------------+
 
 
 
+----------+
| Specimen |
+----------+
|          |
+----------+
 
 
 
+-------------+------------------+--------+----------------+
| Organism    | Antibiotic       | Method | Susceptibility |
+-------------+------------------+--------+----------------+
| Klebsiella  | Amoxicillin +    |        |   Sensitive    |
| pneumoniae  | Clavulanate      |        |                |
+-------------+------------------+--------+----------------+
| Klebsiella  | Piperacillin +   |        |   Sensitive    |
| pneumoniae  | Tazobactam       |        |                |
+-------------+------------------+--------+----------------+
| Klebsiella  | Cefazolin        |        |   Sensitive    |
| pneumoniae  |                  |        |                |
 
+-------------+------------------+--------+----------------+
| Klebsiella  | Ceftriaxone      |        |   Sensitive    |
| pneumoniae  |                  |        |                |
+-------------+------------------+--------+----------------+
| Klebsiella  | Ciprofloxacin    |        |   Sensitive    |
| pneumoniae  |                  |        |                |
+-------------+------------------+--------+----------------+
| Klebsiella  | Levofloxacin     |        |   Sensitive    |
| pneumoniae  |                  |        |                |
+-------------+------------------+--------+----------------+
| Klebsiella  | Tetracycline     |        |   Sensitive    |
| pneumoniae  |                  |        |                |
+-------------+------------------+--------+----------------+
| Klebsiella  | Trimethoprim +   |        |   Sensitive    |
| pneumoniae  | Sulfamethoxazole |        |                |
+-------------+------------------+--------+----------------+
| Klebsiella  | Ertapenem        |        |   Sensitive    |
| pneumoniae  |                  |        |                |
+-------------+------------------+--------+----------------+
| Klebsiella  | Imipenem         |        |   Sensitive    |
| pneumoniae  |                  |        |                |
+-------------+------------------+--------+----------------+
| Klebsiella  | Gentamicin       |        |   Sensitive    |
| pneumoniae  |                  |        |                |
+-------------+------------------+--------+----------------+
| Klebsiella  | Nitrofurantoin   |        |   Intermediate |
| pneumoniae  |                  |        |                |
+-------------+------------------+--------+----------------+
| Klebsiella  | Ampicillin       |        |   Resistant    |
| pneumoniae  |                  |        |                |
+-------------+------------------+--------+----------------+
 documented in this encounter
 
 Visit Diagnoses
 Not on filedocumented in this encounter

## 2020-01-08 NOTE — XMS
Encounter Summary
  Created on: 2020
 
 Viviana Ricci
 External Reference #: [id number]
 : 47
 Sex: Female
 
 Demographics
 
 
+-----------------------+----------------------+
| Address               | [address]      |
|                       | [place]  [address] |
+-----------------------+----------------------+
| Home Phone            | [phone]      |
+-----------------------+----------------------+
| Preferred Language    | Unknown              |
+-----------------------+----------------------+
| Marital Status        | Single               |
+-----------------------+----------------------+
| Hoahaoism Affiliation | [id number]                 |
+-----------------------+----------------------+
| Race                  | Unknown              |
+-----------------------+----------------------+
| Ethnic Group          | Unknown              |
+-----------------------+----------------------+
 
 
 Author
 
 
+--------------+--------------------------------------------+
| Author       | [facility]  |
|              | and [place]                                |
+--------------+--------------------------------------------+
| Organization | [facility]  |
|              | and [place]                                |
+--------------+--------------------------------------------+
| Address      | Unknown                                    |
+--------------+--------------------------------------------+
| Phone        | Unavailable                                |
+--------------+--------------------------------------------+
 
 
 
 Support
 
 
+----------------+--------------+---------------------+-----------------+
| Name           | Relationship | Address             | Phone           |
+----------------+--------------+---------------------+-----------------+
| Ada/Ed Radhames | ECON         | [place]              | [phone] |
|                |              | [place], [place]  |                 |
|                |              |  [address]              |                 |
+----------------+--------------+---------------------+-----------------+
 
 
 
 
 Care Team Providers
 
 
+-----------------------+------+-------------+
| Care Team Member Name | Role | Phone       |
+-----------------------+------+-------------+
 PCP  | Unavailable |
+-----------------------+------+-------------+
 
 
 
 Encounter Details
 
 
+--------+-------------+---------------------+----------------------+----------------------+
| Date   | Type        | Department          | Care Team            | Description          |
+--------+-------------+---------------------+----------------------+----------------------+
| / | Orders Only |   [facility]         |   Letty Hart  | Chronic venous       |
| 2016   |             | NEPHROLOGY  [address]   | M, RN                | embolism and         |
|        |             | [address]   |                      | thrombosis of deep   |
|        |             | [place], [place]     |                      | vessels of proximal  |
|        |             | [address]          |                      | lower extremity,     |
|        |             | [phone]        |                      | left (HCC) (Primary  |
|        |             |                     |                      | Dx)                  |
+--------+-------------+---------------------+----------------------+----------------------+
 
 
 
 Social History
 
 
+--------------+-------+-----------+--------+------+
| Tobacco Use  | Types | Packs/Day | Years  | Date |
|              |       |           | Used   |      |
+--------------+-------+-----------+--------+------+
| Never Smoker |       |           |        |      |
+--------------+-------+-----------+--------+------+
 
 
 
+---------------------+---+---+---+
| Smokeless Tobacco:  |   |   |   |
| Never Used          |   |   |   |
+---------------------+---+---+---+
 
 
 
+---------------------------------------------------------------+
| Comments: some second hand smoke exposure, but fairly minimal |
+---------------------------------------------------------------+
 
 
 
+-------------+-------------+---------+----------+
| Alcohol Use | Drinks/Week | oz/Week | Comments |
+-------------+-------------+---------+----------+
| No          |             |         |          |
+-------------+-------------+---------+----------+
 
 
 
 
+------------------+---------------+
| Sex Assigned at  | Date Recorded |
| Birth            |               |
+------------------+---------------+
| Not on file      |               |
+------------------+---------------+
 
 
 
+----------------+-------------+-------------+
| Job Start Date | Occupation  | Industry    |
+----------------+-------------+-------------+
| Not on file    | Not on file | Not on file |
+----------------+-------------+-------------+
 
 
 
+----------------+--------------+------------+
| Travel History | Travel Start | Travel End |
+----------------+--------------+------------+
 
 
 
+-------------------------------------+
| No recent travel history available. |
+-------------------------------------+
 documented as of this encounter
 
 Plan of Treatment
 
 
+--------+-----------+------------+----------------------+-------------------+
| Date   | Type      | Specialty  | Care Team            | Description       |
+--------+-----------+------------+----------------------+-------------------+
| / | Office    | Cardiology |   Tonia Wilson,    |                   |
|    | Visit     |            | [address]   |                   |
|        |           |            | St  [place]  |                   |
|        |           |            | [address]  [phone]  |                   |
|        |           |            |  [phone] (Fax)  |                   |
+--------+-----------+------------+----------------------+-------------------+
| / | Hospital  | Radiology  |   Popeye Townsend, |                   |
|    | Encounter |            |  MD  [address] |                   |
|        |           |            |  St.  [place],   |                   |
|        |           |            | [place] [address]             |                   |
|        |           |            | [phone]         |                   |
|        |           |            | [phone] (Fax)   |                   |
+--------+-----------+------------+----------------------+-------------------+
| / | Surgery   | Radiology  |   Popeye Townsend, | CV EP PPM SYSTEM  |
|    |           |            |  MD  [address] | IMPLANT           |
|        |           |            |  St.  [place],   |                   |
|        |           |            | [place] [address]             |                   |
|        |           |            | [phone]         |                   |
|        |           |            | [phone] (Fax)   |                   |
+--------+-----------+------------+----------------------+-------------------+
| 02/10/ | Clinical  | Cardiology |                      |                   |
2020   | Support   |            |                      |                   |
+--------+-----------+------------+----------------------+-------------------+
| / | Office    | Cardiology |   Tonia Wilson,    |                   |
 
|    | Visit     |            | ARNP  [address]   |                   |
|        |           |            | St  [place]  |                   |
|        |           |            | [address]  [phone]  |                   |
|        |           |            |  [phone] (Fax)  |                   |
+--------+-----------+------------+----------------------+-------------------+
| / | Off-Site  | Nephrology |   Marzena Moser  |                   |
|    | Visit     |            | DO ETIENNE  [address]      |                   |
|        |           |            | [address]      |                   |
|        |           |            | [place]      |                   |
|        |           |            | [address]  [phone]  |                   |
|        |           |            |  [phone] (Fax)  |                   |
+--------+-----------+------------+----------------------+-------------------+
 documented as of this encounter
 
 Visit Diagnoses
 
 
+----------------------------------------------------------------------------------------+
| Diagnosis                                                                              |
+----------------------------------------------------------------------------------------+
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
| left (HCC) - Primary                                                                   |
+----------------------------------------------------------------------------------------+
 documented in this encounter

## 2020-01-08 NOTE — XMS
Encounter Summary
  Created on: 2020
 
 Viviana Ricci
 External Reference #: [id number]
 : 47
 Sex: Female
 
 Demographics
 
 
+-----------------------+----------------------+
| Address               | [address]      |
|                       | [place]  [address] |
+-----------------------+----------------------+
| Home Phone            | [phone]      |
+-----------------------+----------------------+
| Preferred Language    | Unknown              |
+-----------------------+----------------------+
| Marital Status        | Single               |
+-----------------------+----------------------+
| Scientologist Affiliation | [id number]                 |
+-----------------------+----------------------+
| Race                  | Unknown              |
+-----------------------+----------------------+
| Ethnic Group          | Unknown              |
+-----------------------+----------------------+
 
 
 Author
 
 
+--------------+--------------------------------------------+
| Author       | [facility]  |
|              | and [place]                                |
+--------------+--------------------------------------------+
| Organization | [facility]  |
|              | and [place]                                |
+--------------+--------------------------------------------+
| Address      | Unknown                                    |
+--------------+--------------------------------------------+
| Phone        | Unavailable                                |
+--------------+--------------------------------------------+
 
 
 
 Support
 
 
+----------------+--------------+---------------------+-----------------+
| Name           | Relationship | Address             | Phone           |
+----------------+--------------+---------------------+-----------------+
| Ada/Ed Radhames | ECON         | [address]              | [phone] |
|                |              | [place], [place]  |                 |
|                |              |  [id number]              |                 |
+----------------+--------------+---------------------+-----------------+
 
 
 
 
 Care Team Providers
 
 
+-----------------------+------+-------------+
| Care Team Member Name | Role | Phone       |
+-----------------------+------+-------------+
 PCP  | Unavailable |
+-----------------------+------+-------------+
 
 
 
 Reason for Visit
 Evaluate & Treat (Routine)
 
+--------+--------+------------+--------------+--------------+---------------+
| Status | Reason | Specialty  | Diagnoses /  | Referred By  | Referred To   |
|        |        |            | Procedures   | Contact      | Contact       |
+--------+--------+------------+--------------+--------------+---------------+
| Closed |        | Nephrology |   Diagnoses  |   Stroemel,  |   Stroemel,   |
|        |        |            |              | Marzena CLIFTON,   | Marzena CLIFTON DO |
|        |        |            | Complication | DO  [address]   [address]    |
|        |        |            | s of         |  [place] | [place]   |
|        |        |            | transplanted |  [address]  [place]  | [address]  [place]    |
|        |        |            |  kidney      | [place]    | [place]     |
|        |        |            | Unspecified  | [id number]        | [address]  Phone: |
|        |        |            | hypertensive | Phone:       |  [phone] |
|        |        |            |  kidney      | [phone] |   Fax:        |
|        |        |            | disease with |   Fax:       | [phone]  |
|        |        |            |  chronic     | [phone] |               |
|        |        |            | kidney       |              |               |
|        |        |            | disease      |              |               |
|        |        |            | stage I      |              |               |
|        |        |            | through      |              |               |
|        |        |            | stage IV, or |              |               |
|        |        |            |              |              |               |
|        |        |            | unspecified( |              |               |
|        |        |            | 403.90)      |              |               |
|        |        |            | Chronic      |              |               |
|        |        |            | glomerulonep |              |               |
|        |        |            | hritis with  |              |               |
|        |        |            | lesion of    |              |               |
|        |        |            | membranous   |              |               |
|        |        |            | glomerulonep |              |               |
|        |        |            | hritis       |              |               |
|        |        |            | Unspecified  |              |               |
|        |        |            | essential    |              |               |
|        |        |            | hypertension |              |               |
|        |        |            |   Procedures |              |               |
|        |        |            |   [facility]  |              |               |
|        |        |            | OUTPATIENT   |              |               |
|        |        |            | VISIT 25     |              |               |
|        |        |            | MINUTES      |              |               |
+--------+--------+------------+--------------+--------------+---------------+
 
 
 
 
 Encounter Details
 
 
 
+--------+-----------+---------------------+----------------------+----------------------+
| Date   | Type      | Department          | Care Team            | Description          |
+--------+-----------+---------------------+----------------------+----------------------+
| / | Off-Site  |   [facility]         |   Marzena Moser  | Essential            |
| 2015   | Visit     | NEPHROLOGY  [address]   | M, DO  [address]      | hypertension         |
|        |           | [address]   | [place]      | (Primary Dx); Renal  |
|        |           | [place], [place]     | [place]      | transplant           |
|        |           | [address]          | [address]  [phone]  | recipient;           |
|        |           | [phone]        |  [phone] (Fax)  | Hypothyroidism due   |
|        |           |                     |                      | to acquired atrophy  |
|        |           |                     |                      | of thyroid;          |
|        |           |                     |                      | Hyperlipidemia       |
+--------+-----------+---------------------+----------------------+----------------------+
 
 
 
 Social History
 
 
+--------------+-------+-----------+--------+------+
| Tobacco Use  | Types | Packs/Day | Years  | Date |
|              |       |           | Used   |      |
+--------------+-------+-----------+--------+------+
| Never Smoker |       |           |        |      |
+--------------+-------+-----------+--------+------+
 
 
 
+---------------------+---+---+---+
| Smokeless Tobacco:  |   |   |   |
| Never Used          |   |   |   |
+---------------------+---+---+---+
 
 
 
+---------------------------------------------------------------+
| Comments: some second hand smoke exposure, but fairly minimal |
+---------------------------------------------------------------+
 
 
 
+-------------+-------------+---------+----------+
| Alcohol Use | Drinks/Week | oz/Week | Comments |
+-------------+-------------+---------+----------+
| No          |             |         |          |
+-------------+-------------+---------+----------+
 
 
 
+------------------+---------------+
| Sex Assigned at  | Date Recorded |
| Birth            |               |
+------------------+---------------+
| Not on file      |               |
+------------------+---------------+
 
 
 
+----------------+-------------+-------------+
 
| Job Start Date | Occupation  | Industry    |
+----------------+-------------+-------------+
| Not on file    | Not on file | Not on file |
+----------------+-------------+-------------+
 
 
 
+----------------+--------------+------------+
| Travel History | Travel Start | Travel End |
+----------------+--------------+------------+
 
 
 
+-------------------------------------+
| No recent travel history available. |
+-------------------------------------+
 documented as of this encounter
 
 Last Filed Vital Signs
 
 
+-------------------+----------------------+----------------------+----------+
| Vital Sign        | Reading              | Time Taken           | Comments |
+-------------------+----------------------+----------------------+----------+
| Blood Pressure    | 140/82               | 2015  1:33 PM  |          |
|                   |                      | PDT                  |          |
+-------------------+----------------------+----------------------+----------+
| Pulse             | -                    | -                    |          |
+-------------------+----------------------+----------------------+----------+
| Temperature       | 35   C (95   F)      | 2015  1:18 PM  |          |
|                   |                      | PDT                  |          |
+-------------------+----------------------+----------------------+----------+
| Respiratory Rate  | -                    | -                    |          |
+-------------------+----------------------+----------------------+----------+
| Oxygen Saturation | -                    | -                    |          |
+-------------------+----------------------+----------------------+----------+
| Inhaled Oxygen    | -                    | -                    |          |
| Concentration     |                      |                      |          |
+-------------------+----------------------+----------------------+----------+
| Weight            | 125.8 kg (277 lb 5.4 | 2015  1:18 PM  |          |
|                   |  oz)                 | PDT                  |          |
+-------------------+----------------------+----------------------+----------+
| Height            | -                    | -                    |          |
+-------------------+----------------------+----------------------+----------+
| Body Mass Index   | 44.76                | 2014 12:58 PM  |          |
|                   |                      | PST                  |          |
+-------------------+----------------------+----------------------+----------+
 documented in this encounter
 
 Progress Marzena Tamayo DO - 2015 10:55 AM PDTFormatting of this note might be different
 from the original.
 Subjective:  NEPHROLOGY 
  
 Patient ID: Viviana Ricci is a 69 y.o. female.
 
 HPI Comments: 
 Followup for this 68 YO  white female s/p renal allograft, 05, with remote  allograft 
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
demia, SHPTH,  previous low back pain, obesity/JACK, chronic pulmonary  hypertension, histori
 
izabela related to centripetal obesity, and  CAD, s/p CABG x3 vessels,. 
  
 She states that she developed a DVT in her left leg in  and is now on apixaban
 for this.  She denies increased edema, chest pain, or dyspnea.
 
 MEDS:
 
 Prograf 1.5 mg, BID
 Mycophenolate 250 mg, TID.
 Prednisone 5 mg, daily.
 Outpatient Prescriptions Marked as Taking for the 8/3/15 encounter (Off-Site Visit) with Maye Moser, DO 
 Medication Sig Dispense Refill 
   allopurinol (ZYLOPRIM) 100 mg tablet Take 1 tablet by mouth Daily. 30 tablet 11 
   apixaban (ELIQUIS) 2.5 mg tablet Take 1 tablet by mouth 2 times daily. 60 tablet 6 
   aspirin 81 MG EC tablet Take 81 mg by mouth Daily.   
   cholecalciferol (VITAMIN D-3) 2000 UNITS TABS Take 5,000 Units by mouth Every other day
.   
   cinacalcet (SENSIPAR) 30 mg tablet Take 1 tablet by mouth Daily. 30 tablet 12 
   fludrocortisone (FLORINEF) 0.1 mg tablet Take 1 tablet by mouth Every other day. 45 tab
let 2 
   fluticasone (FLOVENT HFA) 220 mcg/puff inhaler Inhale 1 puff into the lungs 2 times shante
ly. Rinse mouth after use. 1 Inhaler 11 
   furosemide (LASIX) 40 mg tablet Take 1 tablet by mouth Daily as needed. 30 tablet 5 
   glucose blood VI test strips (ONE TOUCH ULTRA TEST) strip Check blood sugar before each
 meal and as directed 100 each 12 
   insulin glargine (LANTUS) 100 units/mL injection Inject 20 Units under the skin every m
orning. 10 mL 11 
   insulin lispro (HUMALOG) 100 units/mL injection Inject subcutaneously before meals acco
rding to sliding scale 10 vial 11 
   Insulin Syringe-Needle U-100 (BD INSULIN SYRINGE ULTRAFINE) 31G X 5/16" 0.5 ML MISC Use
 before meals and as directed. 100 each 11 
   levothyroxine (LEVOTHROID) 50 mcg tablet Take 1 tablet by mouth Daily. 30 tablet 11 
   lisinopril (PRINIVIL,ZESTRIL) 30 MG tablet Take 1 tablet by mouth Daily. 90 tablet 3 
   loperamide (ANTI-DIARRHEAL) 2 mg capsule Take 1 capsule by mouth 4 times daily as neede
d. 60 capsule 5 
   losartan (COZAAR) 50 mg tablet Take 1 tablet by mouth Daily. 90 tablet 4 
   magnesium oxide (MAG-OX) 400 mg tablet Take 1 tablet by mouth 2 times daily. 62 tablet 
12 
   metoprolol tartrate (LOPRESSOR) 25 mg tablet Take 1 tablet by mouth 2 times daily. 60 t
ablet 11 
   omeprazole (PRILOSEC) 20 mg capsule Take one capsule by mouth once daily on an empty st
omach 90 capsule 4 
   Prenatal Multivit-Min-Fe-FA (PRENATAL VITAMINS) 0.8 MG TABS Take 0.8 mg by mouth Daily.
 30 each 11 
   Respiratory Therapy Supplies MISC Decrease CPAP to 12-18 cmH2O Diagnosis Code(s)327.23.
 Please send [facility] to [facility]. 1 each 0 
   rosuvastatin (CRESTOR) 20 mg tablet Take 1 tablet by mouth nightly. 30 tablet 11 
 
 No Known Allergies
 
 Objective:   Blood pressure 140/82, temperature 35 C (95 F), weight 125.8 kg (277 lb 5.
4 oz).
 weight = refused.
  
 Physical Exam
 
 HEENT: No thrush.
 Heart: Regular rate and rhythm, with no S3, S4, murmur or rub.
 Lungs:   CTA bilaterally, no rales or wheezes.
 
 Abdomen:  Soft, obese, the renal allograft in RLQ is nontender, normoactive bowel sounds.
 Extremities:  no clubbing, cyanosis, (+) trace edema.  No foot ulcers.
 
 Lab Results 
 Component Value Date 
  NAEX 139 2015 
  KEX 5.3* 2015 
  CLEX 110 2015 
  CO2EX 20 2015 
  BUNEX 33* 2015 
  CREEX 1.21 2015 
  EGFREX 44 2015 
  GLUEX 128* 2015 
  PHOSEX 2.8 2015 
  MGEX 1.7 2015 
  PTHEX 203.6 2015 
  NHD5PNG 7.3* 2014 
 
 Lab Results 
 Component Value Date 
  CHOLEX 156 2015 
  HDLEX 47.9 2015 
  LDLEX 69 2015 
  TRIGEX 194* 2015 
 
 Lab Results 
 Component Value Date 
  WBCEX 3.8* 2015 
  HGBEX 13.3 2015 
  HCTEX 39.9 2015 
  PLTEX 155 2015 
 
 Lab Results 
 Component Value Date 
  TACROLIMUSEX 5.5 2015 
 
 Assessment: 
 
 1.  Renal Allograft--Scr and tacrolimus levels are stable.
 2.  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
 3.  Type 2 DM, requiring insuline--good control.
 4.  Hyperlipidemia--stable on moderate intensity rosuvastatin.
 5.  Hypertension--good control.
 6.  SHPTH--stable.  
 7.  Obesity/JACK/Pulmonary hypertension-- RHF has improved last 5-6 years?
 8.  CAD, s/p CABG, --stable on ASA, metoprolol, atorvastatin.
 9.  Type IV RTA--stable.
 10. Chronic Low Back pain--in remission.
 11.   DJD, left knee--about same.
 12.  s/p DVT left leg, 2015--stable on apixaban.
 
 Plan: 
 
  
 1.  I reviewed with Viviana her recent lab and drug levels.  Her allograft fucntion, BP, and g
lycemic control appear stable on her
 current regimen.
 2.  I think her choice to utilize apixaban for DVT prevention long term, is very reasonable
. She appears to have stable tacro. levels on the current dose.
 3.   Will plan to see her back in 6 mo. at the [facility] [place]. She will continue 
 
to do her standing order , every 3 mo. also.
 
 CC:    Jose David Dalal M.D., [facility]
            Edgar Sanders MD, [facility], Orthopedics
 
 Electronically signed by Marzena Moser DO at 2015 11:26 AM PDTdocumented in thi
s encounter
 
 Plan of Treatment
 
 
+--------+-----------+------------+----------------------+-------------------+
| Date   | Type      | Specialty  | Care Team            | Description       |
+--------+-----------+------------+----------------------+-------------------+
| / | Office    | Cardiology |   Tonia Wilson,    |                   |
|    | Visit     |            | ARNJESSICA  [address]   |                   |
|        |           |            | St  [place], [place]  |                   |
|        |           |            | [address]  [phone]  |                   |
|        |           |            |  [phone] (Fax)  |                   |
+--------+-----------+------------+----------------------+-------------------+
| / | Hospital  | Radiology  |   Popeye Townsend, |                   |
|    | Encounter |            |  MD  [address] [place] |                   |
|        |           |            |  St.  [place],   |                   |
|        |           |            | [place] [address]             |                   |
|        |           |            | [phone]         |                   |
|        |           |            | [phone] (Fax)   |                   |
+--------+-----------+------------+----------------------+-------------------+
| / | Surgery   | Radiology  |   Popeye Townsend, | CV EP PPM SYSTEM  |
|    |           |            |  MD  [address] | IMPLANT           |
|        |           |            |  St.  [place],   |                   |
|        |           |            | [place] [address]             |                   |
|        |           |            | [phone]         |                   |
|        |           |            | [phone] (Fax)   |                   |
+--------+-----------+------------+----------------------+-------------------+
| 02/10/ | Clinical  | Cardiology |                      |                   |
|    | Support   |            |                      |                   |
+--------+-----------+------------+----------------------+-------------------+
| / | Office    | Cardiology |   Tonia Wilson,    |                   |
|    | Visit     |            | [place] [address]   |                   |
|        |           |            | [place]  |                   |
|        |           |            | [address]  [phone]  |                   |
|        |           |            |  [phone] (Fax)  |                   |
+--------+-----------+------------+----------------------+-------------------+
| / | Off-Site  | Nephrology |   Marzena Moser  |                   |
2020   | Visit     |            | DO ETIENNE  [address] [place] [address]      |                   |
|        |           |            | [address]      |                   |
|        |           |            | [place]      |                   |
|        |           |            | [address]  [phone]  |                   |
|        |           |            |  [phone] (Fax)  |                   |
+--------+-----------+------------+----------------------+-------------------+
 documented as of this encounter
 
 Visit Diagnoses
 
 
+------------------------------------------------------------------------+
| Diagnosis                                                              |
+------------------------------------------------------------------------+
|   Essential hypertension - Primary  Unspecified essential hypertension |
+------------------------------------------------------------------------+
 
|   Renal transplant recipient                                           |
+------------------------------------------------------------------------+
|   Hypothyroidism due to acquired atrophy of thyroid                    |
+------------------------------------------------------------------------+
|   Hyperlipidemia  Other and unspecified hyperlipidemia                 |
+------------------------------------------------------------------------+
 documented in this encounter

## 2020-01-08 NOTE — XMS
Encounter Summary
  Created on: 2020
 
 Viviana Ricci
 External Reference #: [id number]
 : 47
 Sex: Female
 
 Demographics
 
 
+-----------------------+----------------------+
| Address               | [address]      |
|                       | [place]  [address] |
+-----------------------+----------------------+
| Home Phone            | [phone]      |
+-----------------------+----------------------+
| Preferred Language    | Unknown              |
+-----------------------+----------------------+
| Marital Status        | Single               |
+-----------------------+----------------------+
| Yarsanism Affiliation | [id number]                 |
+-----------------------+----------------------+
| Race                  | Unknown              |
+-----------------------+----------------------+
| Ethnic Group          | Unknown              |
+-----------------------+----------------------+
 
 
 Author
 
 
+--------------+--------------------------------------------+
| Author       | [facility]  |
|              | and [facility]                                |
+--------------+--------------------------------------------+
| Organization | [facility]  |
|              | and [facility]                                |
+--------------+--------------------------------------------+
| Address      | Unknown                                    |
+--------------+--------------------------------------------+
| Phone        | Unavailable                                |
+--------------+--------------------------------------------+
 
 
 
 Support
 
 
+----------------+--------------+---------------------+-----------------+
| Name           | Relationship | Address             | Phone           |
+----------------+--------------+---------------------+-----------------+
| Ada/Ed Radhames | ECON         | [address]              | [phone] |
|                |              | [place]  |                 |
|                |              |  [address]              |                 |
+----------------+--------------+---------------------+-----------------+
 
 
 
 
 Care Team Providers
 
 
+-----------------------+------+-------------+
| Care Team Member Name | Role | Phone       |
+-----------------------+------+-------------+
 PCP  | Unavailable |
+-----------------------+------+-------------+
 
 
 
 Reason for Visit
 
 
+-------------------+----------+
| Reason            | Comments |
+-------------------+----------+
| Medication Refill |          |
+-------------------+----------+
 
 
 
 Encounter Details
 
 
+--------+--------+---------------------+----------------------+-------------------+
| Date   | Type   | Department          | Care Team            | Description       |
+--------+--------+---------------------+----------------------+-------------------+
| 10/14/ | Refill |   [facility]         |   Marzena Moser  | Medication Refill |
|    |        | NEPHROLOGY  [address]   | M, DO  [address]      |                   |
|        |        | [address]   | [address]      |                   |
|        |        | [place], [place]     | [place], [place]      |                   |
|        |        | [address]          | [address]  [phone]  |                   |
|        |        | [phone]        |  [phone] (Fax)  |                   |
+--------+--------+---------------------+----------------------+-------------------+
 
 
 
 Social History
 
 
+--------------+-------+-----------+--------+------+
| Tobacco Use  | Types | Packs/Day | Years  | Date |
|              |       |           | Used   |      |
+--------------+-------+-----------+--------+------+
| Never Smoker |       |           |        |      |
+--------------+-------+-----------+--------+------+
 
 
 
+---------------------+---+---+---+
| Smokeless Tobacco:  |   |   |   |
| Never Used          |   |   |   |
+---------------------+---+---+---+
 
 
 
+---------------------------------------------------------------+
| Comments: some second hand smoke exposure, but fairly minimal |
 
+---------------------------------------------------------------+
 
 
 
+-------------+-------------+---------+----------+
| Alcohol Use | Drinks/Week | oz/Week | Comments |
+-------------+-------------+---------+----------+
| No          |             |         |          |
+-------------+-------------+---------+----------+
 
 
 
+------------------+---------------+
| Sex Assigned at  | Date Recorded |
| Birth            |               |
+------------------+---------------+
| Not on file      |               |
+------------------+---------------+
 
 
 
+----------------+-------------+-------------+
| Job Start Date | Occupation  | Industry    |
+----------------+-------------+-------------+
| Not on file    | Not on file | Not on file |
+----------------+-------------+-------------+
 
 
 
+----------------+--------------+------------+
| Travel History | Travel Start | Travel End |
+----------------+--------------+------------+
 
 
 
+-------------------------------------+
| No recent travel history available. |
+-------------------------------------+
 documented as of this encounter
 
 Plan of Treatment
 
 
+--------+-----------+------------+----------------------+-------------------+
| Date   | Type      | Specialty  | Care Team            | Description       |
+--------+-----------+------------+----------------------+-------------------+
| / | Office    | Cardiology |   HellalfredoTonia,    |                   |
|    | Visit     |            | ARNP  [address]   |                   |
|        |           |            | St  [place], [place]  |                   |
|        |           |            | [address]  [phone]  |                   |
|        |           |            |  [phone] (Fax)  |                   |
+--------+-----------+------------+----------------------+-------------------+
| / | Hospital  | Radiology  |   Popeye Townsend, |                   |
|    | Encounter |            |  MD  [address] |                   |
|        |           |            |  St.  [place],   |                   |
|        |           |            | [place] [address]             |                   |
|        |           |            | [phone]         |                   |
|        |           |            | [phone] (Fax)   |                   |
+--------+-----------+------------+----------------------+-------------------+
| / | Surgery   | Radiology  |   Popeye Townsend, | CV EP PPM SYSTEM  |
 
|    |           |            |  MD  [address] | IMPLANT           |
|        |           |            |  St.  [place],   |                   |
|        |           |            | [place] [address]             |                   |
|        |           |            | [phone]         |                   |
|        |           |            | [phone] (Fax)   |                   |
+--------+-----------+------------+----------------------+-------------------+
| 02/10/ | Clinical  | Cardiology |                      |                   |
|    | Support   |            |                      |                   |
+--------+-----------+------------+----------------------+-------------------+
| / | Office    | Cardiology |   Tonia Wilson,    |                   |
|    | Visit     |            | ARNP  [address]   |                   |
|        |           |            | [place]  |                   |
|        |           |            | [address]  [phone]  |                   |
|        |           |            |  [phone] (Fax)  |                   |
+--------+-----------+------------+----------------------+-------------------+
| / | Off-Site  | Nephrology |   Marzena Moser  |                   |
|    | Visit     |            | DO ETIENNE  [address] [place] [address]      |                   |
|        |           |            | [address]      |                   |
|        |           |            | [place]      |                   |
|        |           |            | [address]  [phone]  |                   |
|        |           |            |  [phone] (Fax)  |                   |
+--------+-----------+------------+----------------------+-------------------+
 documented as of this encounter
 
 Visit Diagnoses
 
 
+-------------------------------------------+
| Diagnosis                                 |
+-------------------------------------------+
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
+-------------------------------------------+
 documented in this encounter

## 2020-01-08 NOTE — XMS
Encounter Summary
  Created on: 2020
 
 Viviana Ricci
 External Reference #: [id number]
 : 47
 Sex: Female
 
 Demographics
 
 
+-----------------------+----------------------+
| Address               | [address]      |
|                       | [place]  [address] |
+-----------------------+----------------------+
| Home Phone            | [phone]      |
+-----------------------+----------------------+
| Preferred Language    | Unknown              |
+-----------------------+----------------------+
| Marital Status        | Single               |
+-----------------------+----------------------+
| Oriental orthodox Affiliation | [id number]                 |
+-----------------------+----------------------+
| Race                  | Unknown              |
+-----------------------+----------------------+
| Ethnic Group          | Unknown              |
+-----------------------+----------------------+
 
 
 Author
 
 
+--------------+--------------------------------------------+
| Author       | [facility]  |
|              | and [facility]                                |
+--------------+--------------------------------------------+
| Organization | [facility]  |
|              | and [place]                                |
+--------------+--------------------------------------------+
| Address      | Unknown                                    |
+--------------+--------------------------------------------+
| Phone        | Unavailable                                |
+--------------+--------------------------------------------+
 
 
 
 Support
 
 
+----------------+--------------+---------------------+-----------------+
| Name           | Relationship | Address             | Phone           |
+----------------+--------------+---------------------+-----------------+
| Ada/Ed Radhames | ECON         | [address]              | [phone] |
|                |              | [place]  |                 |
|                |              |  [id number]              |                 |
+----------------+--------------+---------------------+-----------------+
 
 
 
 
 Care Team Providers
 
 
+-----------------------+------+-------------+
| Care Team Member Name | Role | Phone       |
+-----------------------+------+-------------+
 PCP  | Unavailable |
+-----------------------+------+-------------+
 
 
 
 Reason for Visit
 Diagnostic/Screening (Routine)
 
+--------+--------+-----------+--------------+--------------+--------------+
| Status | Reason | Specialty | Diagnoses /  | Referred By  | Referred To  |
|        |        |           | Procedures   | Contact      | Contact      |
+--------+--------+-----------+--------------+--------------+--------------+
| Closed |        | Radiology |   Diagnoses  |   [facility]   |              |
|        |        |           |  Sprain of   | [address] |              |
|        |        |           | lumbar       |   [place]      |              |
|        |        |           | region,      | [place], [place]    |              |
|        |        |           | initial      | [address]   |              |
|        |        |           | encounter    | Phone:       |              |
|        |        |           | Lumbago      | [phone] |              |
|        |        |           | Procedures   |   Fax:       |              |
|        |        |           | MRI Lumbar   | [phone] |              |
|        |        |           | Spine wo     |              |              |
|        |        |           | Contrast     |              |              |
+--------+--------+-----------+--------------+--------------+--------------+
 
 
 
 
 Encounter Details
 
 
+--------+-----------+----------------------+----------------------+--------------------+
| Date   | Type      | Department           | Care Team            | Description        |
+--------+-----------+----------------------+----------------------+--------------------+
| 09/10/ | Hospital  |   [facility] |   Prem Chowdary,  | Sprain of lumbar   |
|    | Encounter |  [facility]  [address]  | [place]  [address]   | region, initial    |
|        |           | [place]  [place], | [place] [address]   | encounter; Lumbago |
|        |           |  [place] [address]       | [phone]         |                    |
|        |           | [phone]         | [phone] (Fax)   |                    |
+--------+-----------+----------------------+----------------------+--------------------+
 
 
 
 Social History
 
 
+--------------+-------+-----------+--------+------+
| Tobacco Use  | Types | Packs/Day | Years  | Date |
|              |       |           | Used   |      |
+--------------+-------+-----------+--------+------+
| Never Smoker |       |           |        |      |
+--------------+-------+-----------+--------+------+
 
 
 
 
+---------------------+---+---+---+
| Smokeless Tobacco:  |   |   |   |
| Never Used          |   |   |   |
+---------------------+---+---+---+
 
 
 
+---------------------------------------------------------------+
| Comments: some second hand smoke exposure, but fairly minimal |
+---------------------------------------------------------------+
 
 
 
+-------------+-------------+---------+----------+
| Alcohol Use | Drinks/Week | oz/Week | Comments |
+-------------+-------------+---------+----------+
| No          |             |         |          |
+-------------+-------------+---------+----------+
 
 
 
+------------------+---------------+
| Sex Assigned at  | Date Recorded |
| Birth            |               |
+------------------+---------------+
| Not on file      |               |
+------------------+---------------+
 
 
 
+----------------+-------------+-------------+
| Job Start Date | Occupation  | Industry    |
+----------------+-------------+-------------+
| Not on file    | Not on file | Not on file |
+----------------+-------------+-------------+
 
 
 
+----------------+--------------+------------+
| Travel History | Travel Start | Travel End |
+----------------+--------------+------------+
 
 
 
+-------------------------------------+
| No recent travel history available. |
+-------------------------------------+
 documented as of this encounter
 
 Medications at Time of Discharge
 
 
+----------------------+----------------------+-----------+---------+----------+-----------+
| Medication           | Sig                  | Dispensed | Refills | Start    | End Date  |
|                      |                      |           |         | Date     |           |
+----------------------+----------------------+-----------+---------+----------+-----------+
|   cholecalciferol    | Take 2,000 Units by  |           | 0       |          |           |
| (VITAMIN D-3) 2000   | mouth Every other    |           |         |          |           |
 
| UNITS                | day.                 |           |         |          |           |
| TABSIndications:     |                      |           |         |          |           |
| Unspecified          |                      |           |         |          |           |
| hypertensive kidney  |                      |           |         |          |           |
| disease with chronic |                      |           |         |          |           |
|  kidney disease      |                      |           |         |          |           |
| stage I through      |                      |           |         |          |           |
| stage IV, or         |                      |           |         |          |           |
| unspecified(403.90), |                      |           |         |          |           |
|  FSGS (focal         |                      |           |         |          |           |
| segmental            |                      |           |         |          |           |
| glomerulosclerosis), |                      |           |         |          |           |
|  Hyperlipidemia,     |                      |           |         |          |           |
| Complications of     |                      |           |         |          |           |
| transplanted kidney  |                      |           |         |          |           |
+----------------------+----------------------+-----------+---------+----------+-----------+
|   glucose blood VI   | Check blood sugar    |   100     | 12      | 20 |           |
| test strips (ONE     | before each meal and | each      |         | 12       |           |
| TOUCH ULTRA TEST)    |  as directed         |           |         |          |           |
| stripIndications:    |                      |           |         |          |           |
| Unspecified          |                      |           |         |          |           |
| hypertensive kidney  |                      |           |         |          |           |
| disease with chronic |                      |           |         |          |           |
|  kidney disease      |                      |           |         |          |           |
| stage I through      |                      |           |         |          |           |
| stage IV, or         |                      |           |         |          |           |
| unspecified(403.90), |                      |           |         |          |           |
|  Complications of    |                      |           |         |          |           |
| transplanted kidney, |                      |           |         |          |           |
|  Diabetes mellitus   |                      |           |         |          |           |
| type II,             |                      |           |         |          |           |
| uncontrolled (HCC),  |                      |           |         |          |           |
| Hyperlipidemia       |                      |           |         |          |           |
+----------------------+----------------------+-----------+---------+----------+-----------+
|   loperamide         | Take 1 capsule by    |   60      | 5       | 20 |           |
| (ANTI-DIARRHEAL) 2   | mouth 4 times daily  | capsule   |         | 14       |           |
| mg                   | as needed.           |           |         |          |           |
| capsuleIndications:  |                      |           |         |          |           |
| Unspecified          |                      |           |         |          |           |
| hypertensive kidney  |                      |           |         |          |           |
| disease with chronic |                      |           |         |          |           |
|  kidney disease      |                      |           |         |          |           |
| stage I through      |                      |           |         |          |           |
| stage IV, or         |                      |           |         |          |           |
| unspecified(403.90), |                      |           |         |          |           |
|  FSGS (focal         |                      |           |         |          |           |
| segmental            |                      |           |         |          |           |
| glomerulosclerosis), |                      |           |         |          |           |
|  Hypothyroidism,     |                      |           |         |          |           |
| Hyperlipidemia       |                      |           |         |          |           |
+----------------------+----------------------+-----------+---------+----------+-----------+
|   Respiratory        | Decrease CPAP to     |   1 each  | 0       | 20 |           |
| Therapy Supplies     | 12-18 cmH2O          |           |         | 13       |           |
| MISC                 | Diagnosis            |           |         |          |           |
|                      | Code(s)327.23.       |           |         |          |           |
|                      | Please send order to |           |         |          |           |
|                      |  [facility].     |           |         |          |           |
+----------------------+----------------------+-----------+---------+----------+-----------+
|   allopurinol        | Take 1 tablet by     |   30      | 11      | 02/10/20 |  |
| (ZYLOPRIM) 100 mg    | mouth Daily.         | tablet    |         | 14       | 5         |
 
| tablet               |                      |           |         |          |           |
+----------------------+----------------------+-----------+---------+----------+-----------+
|   aspirin 81 MG EC   | Take 81 mg by mouth  |           | 0       | 20 |  |
| tablet               | Daily.               |           |         | 12       | 5         |
+----------------------+----------------------+-----------+---------+----------+-----------+
|   cinacalcet         | Take 1 tablet by     |   30      | 12      | 20 |  |
| (SENSIPAR) 30 mg     | mouth Daily.         | tablet    |         | 13       | 4         |
| tablet               |                      |           |         |          |           |
+----------------------+----------------------+-----------+---------+----------+-----------+
|   fludrocortisone    | Take 1 tablet by     |   45      | 2       | 20 |  |
| (FLORINEF) 0.1 mg    | mouth Every other    | tablet    |         | 14       | 5         |
| tablet               | day.                 |           |         |          |           |
+----------------------+----------------------+-----------+---------+----------+-----------+
|   fluticasone        | Inhale 1 puff into   |   1       | 11      | 20 |  |
| (FLOVENT HFA) 220    | the lungs 2 times    | Inhaler   |         | 13       | 5         |
| mcg/puff inhaler     | daily. Rinse mouth   |           |         |          |           |
|                      | after use.           |           |         |          |           |
+----------------------+----------------------+-----------+---------+----------+-----------+
|   furosemide (LASIX) | Take 40 mg by mouth  |           | 0       | 20 |  |
|  40 mg               | Daily as needed.     |           |         | 14       | 5         |
| tabletIndications:   |                      |           |         |          |           |
| Unspecified          |                      |           |         |          |           |
| hypertensive kidney  |                      |           |         |          |           |
| disease with chronic |                      |           |         |          |           |
|  kidney disease      |                      |           |         |          |           |
| stage I through      |                      |           |         |          |           |
| stage IV, or         |                      |           |         |          |           |
| unspecified(403.90), |                      |           |         |          |           |
|  FSGS (focal         |                      |           |         |          |           |
| segmental            |                      |           |         |          |           |
| glomerulosclerosis), |                      |           |         |          |           |
|  Hypothyroidism,     |                      |           |         |          |           |
| Hyperlipidemia       |                      |           |         |          |           |
+----------------------+----------------------+-----------+---------+----------+-----------+
|   insulin glargine   | Inject 20 Units      |   10 mL   | 11      | 20 |  |
| (LANTUS) 100         | under the skin every |           |         | 14       | 5         |
| units/mL             |  morning.            |           |         |          |           |
| injectionIndications |                      |           |         |          |           |
| : Unspecified        |                      |           |         |          |           |
| hypertensive kidney  |                      |           |         |          |           |
| disease with chronic |                      |           |         |          |           |
|  kidney disease      |                      |           |         |          |           |
| stage I through      |                      |           |         |          |           |
| stage IV, or         |                      |           |         |          |           |
| unspecified(403.90), |                      |           |         |          |           |
|  Complications of    |                      |           |         |          |           |
| transplanted kidney, |                      |           |         |          |           |
|  Diabetes mellitus   |                      |           |         |          |           |
| type II,             |                      |           |         |          |           |
| uncontrolled (HCC),  |                      |           |         |          |           |
| Hyperlipidemia       |                      |           |         |          |           |
+----------------------+----------------------+-----------+---------+----------+-----------+
|   insulin lispro     | Inject               |   10 vial | 11      | 20 |  |
| (HUMALOG) 100        | subcutaneously       |           |         | 14       | 5         |
| units/mL injection   | before meals         |           |         |          |           |
|                      | according to sliding |           |         |          |           |
|                      |  scale               |           |         |          |           |
+----------------------+----------------------+-----------+---------+----------+-----------+
|   Insulin            | Use before meals and |   100     | 11      | 20 |  |
| Syringe-Needle U-100 |  as directed.        | each      |         | 14       | 5         |
 
|  (BD INSULIN SYRINGE |                      |           |         |          |           |
|  ULTRAFINE) 31G X    |                      |           |         |          |           |
| " 0.5 ML         |                      |           |         |          |           |
| MISCIndications:     |                      |           |         |          |           |
| Type II or           |                      |           |         |          |           |
| unspecified type     |                      |           |         |          |           |
| diabetes mellitus    |                      |           |         |          |           |
| without mention of   |                      |           |         |          |           |
| complication,        |                      |           |         |          |           |
| uncontrolled,        |                      |           |         |          |           |
| Insulin dependent    |                      |           |         |          |           |
| type 2 diabetes      |                      |           |         |          |           |
| mellitus,            |                      |           |         |          |           |
| uncontrolled (HCC)   |                      |           |         |          |           |
+----------------------+----------------------+-----------+---------+----------+-----------+
|   levothyroxine      | Take 1 tablet by     |   30      | 11      | 20 | 10/06/201 |
| (LEVOTHROID) 50 mcg  | mouth Daily.         | tablet    |         | 13       | 4         |
| tablet               |                      |           |         |          |           |
+----------------------+----------------------+-----------+---------+----------+-----------+
|   lisinopril         | Take 1 tablet by     |   90      | 3       | 20 |  |
| (PRINIVIL,ZESTRIL)   | mouth Daily.         | tablet    |         | 13       | 4         |
| 30 MG tablet         |                      |           |         |          |           |
+----------------------+----------------------+-----------+---------+----------+-----------+
|   magnesium oxide    | Take 1 tablet by     |   62      | 12      | 20 |  |
| (MAG-OX) 400 mg      | mouth 2 times daily. | tablet    |         | 14       | 5         |
| tablet               |                      |           |         |          |           |
+----------------------+----------------------+-----------+---------+----------+-----------+
|   metoprolol         | Take 1 tablet by     |   60      | 11      | 20 |  |
| tartrate (LOPRESSOR) | mouth 2 times daily. | tablet    |         | 14       | 5         |
|  25 mg tablet        |                      |           |         |          |           |
+----------------------+----------------------+-----------+---------+----------+-----------+
|   mycophenolate      | Take 250 mg by mouth |           | 0       | 20 | 10/14/201 |
| (CELLCEPT) 250 mg    |  3 times daily.      |           |         | 11       | 5         |
| capsule              |                      |           |         |          |           |
+----------------------+----------------------+-----------+---------+----------+-----------+
|   omeprazole         | Take one capsule by  |   90      | 3       | 20 |  |
| (PRILOSEC) 20 mg     | mouth once daily on  | capsule   |         | 14       | 5         |
| capsule              | an empty stomach     |           |         |          |           |
+----------------------+----------------------+-----------+---------+----------+-----------+
|   predniSONE         | Take 1 tablet by     |   90      | 3       | 20 |  |
| (DELTASONE) 5 mg     | mouth Daily.         | tablet    |         | 14       | 5         |
| tablet               |                      |           |         |          |           |
+----------------------+----------------------+-----------+---------+----------+-----------+
|   Prenatal           | Take 0.8 mg by mouth |   30 each | 11      | 20 |  |
| Multivit-Min-Fe-FA   |  Daily.              |           |         | 13       | 4         |
| (PRENATAL VITAMINS)  |                      |           |         |          |           |
| 0.8 MG TABS          |                      |           |         |          |           |
+----------------------+----------------------+-----------+---------+----------+-----------+
|   rosuvastatin       | Take 1 tablet by     |   30      | 11      | 20 |  |
| (CRESTOR) 20 mg      | mouth nightly.       | tablet    |         | 14       | 5         |
| tablet               |                      |           |         |          |           |
+----------------------+----------------------+-----------+---------+----------+-----------+
|   tacrolimus         | Take 1 capsule by    |   60      | 11      | 07/15/20 |  |
| (PROGRAF) 0.5 mg     | mouth 2 times daily. | capsule   |         | 14       | 6         |
| capsuleIndications:  |  With 1 mg to equal  |           |         |          |           |
| Unspecified          | 1.5 mg bid           |           |         |          |           |
| hypertensive kidney  |                      |           |         |          |           |
| disease with chronic |                      |           |         |          |           |
|  kidney disease      |                      |           |         |          |           |
| stage I through      |                      |           |         |          |           |
 
| stage IV, or         |                      |           |         |          |           |
| unspecified(403.90), |                      |           |         |          |           |
|  Complications of    |                      |           |         |          |           |
| transplanted kidney, |                      |           |         |          |           |
|  Diabetes mellitus   |                      |           |         |          |           |
| type II,             |                      |           |         |          |           |
| uncontrolled (HCC),  |                      |           |         |          |           |
| Hyperlipidemia       |                      |           |         |          |           |
+----------------------+----------------------+-----------+---------+----------+-----------+
|   tacrolimus         | Take 1 mg by mouth   |   60      | 11      | 20 |  |
| (PROGRAF) 1 mg       | twice daily with 0.5 | capsule   |         | 14       | 6         |
| capsuleIndications:  |  mg to equal 1.5 mg  |           |         |          |           |
| Unspecified          | twice daily          |           |         |          |           |
| hypertensive kidney  |                      |           |         |          |           |
| disease with chronic |                      |           |         |          |           |
|  kidney disease      |                      |           |         |          |           |
| stage I through      |                      |           |         |          |           |
| stage IV, or         |                      |           |         |          |           |
| unspecified(403.90), |                      |           |         |          |           |
|  FSGS (focal         |                      |           |         |          |           |
| segmental            |                      |           |         |          |           |
| glomerulosclerosis), |                      |           |         |          |           |
|  Hyperlipidemia,     |                      |           |         |          |           |
| Complications of     |                      |           |         |          |           |
| transplanted kidney  |                      |           |         |          |           |
+----------------------+----------------------+-----------+---------+----------+-----------+
|   valsartan (DIOVAN) | Take 1 tablet by     |   30      | 11      | 20 |  |
|  160 mg tablet       | mouth Daily.         | tablet    |         | 14       | 4         |
+----------------------+----------------------+-----------+---------+----------+-----------+
 documented as of this encounter
 
 Plan of Treatment
 
 
+--------+-----------+------------+----------------------+-------------------+
| Date   | Type      | Specialty  | Care Team            | Description       |
+--------+-----------+------------+----------------------+-------------------+
| / | Office    | Cardiology |   Tonia Wilson,    |                   |
|    | Visit     |            | ARNP  [address]   |                   |
|        |           |            | St  [place], [place]  |                   |
|        |           |            | [address]  [phone]  |                   |
|        |           |            |  [phone] (Fax)  |                   |
+--------+-----------+------------+----------------------+-------------------+
| / | Hospital  | Radiology  |   Cary Suwong, |                   |
|    | Encounter |            |  MD  [address] |                   |
|        |           |            |  St.  [place],   |                   |
|        |           |            | [place] [address]             |                   |
|        |           |            | [phone]         |                   |
|        |           |            | [phone] (Fax)   |                   |
+--------+-----------+------------+----------------------+-------------------+
| / | Surgery   | Radiology  |   Aimeechidi Yinpeeraul, | CV EP PPM SYSTEM  |
|    |           |            |  MD  [address] | IMPLANT           |
|        |           |            |  St.  [place],   |                   |
|        |           |            | [place] [address]             |                   |
|        |           |            | [phone]         |                   |
|        |           |            | [phone] (Fax)   |                   |
+--------+-----------+------------+----------------------+-------------------+
| 02/10/ | Clinical  | Cardiology |                      |                   |
|    | Support   |            |                      |                   |
+--------+-----------+------------+----------------------+-------------------+
 
| / | Office    | Cardiology |   Tonia Wilson,    |                   |
|    | Visit     |            | ARNP  [address]   |                   |
|        |           |            | St  [place], [place]  |                   |
|        |           |            | [address]  [phone]  |                   |
|        |           |            |  [phone] (Fax)  |                   |
+--------+-----------+------------+----------------------+-------------------+
| / | Off-Site  | Nephrology |   Marzena Moser  |                   |
|    | Visit     |            | MDO  [address]      |                   |
|        |           |            | [address]      |                   |
|        |           |            | [place]      |                   |
|        |           |            | [address]  [phone]  |                   |
|        |           |            |  [phone] (Fax)  |                   |
+--------+-----------+------------+----------------------+-------------------+
 documented as of this encounter
 
 Procedures
 
 
+----------------------+--------+-------------+----------------------+----------------------
+
| Procedure Name       | Priori | Date/Time   | Associated Diagnosis | Comments             
|
|                      | ty     |             |                      |                      
|
+----------------------+--------+-------------+----------------------+----------------------
+
| MRI LUMBAR SPINE WO  | Routin | 09/10/2014  |   Sprain of lumbar   |   Results for this   
|
| CONTRAST             | e      |  2:31 PM    | region, initial      | procedure are in the 
|
|                      |        | PDT         | encounter  Lumbago   |  results section.    
|
+----------------------+--------+-------------+----------------------+----------------------
+
 documented in this encounter
 
 Results
 MRI Lumbar Spine wo Contrast (09/10/2014  2:31 PM PDT)
 
+----------+
| Specimen |
+----------+
|          |
+----------+
 
 
 
+------------------------------------------------------------------------+---------------+
| Narrative                                                              | Performed At  |
+------------------------------------------------------------------------+---------------+
|   MRI LUMBAR SPINE WO CONTRAST 9/10/2014 2:31 PM      HISTORY: Lumbar  |   [facility] |
| sprain and lumbago.     COMPARISON: None.     PROTOCOL: Sagittal T2,   |               |
| sagittal T1, axial T2, axial T1, sagittal STIR, coronal  T2.           |               |
| FINDINGS:  There is mild levoscoliosis of the lumbar spine. A moderate |               |
|  to severe  compression fracture is visualized of T11 with no          |               |
| significant osseous edema,  likely chronic. Multilevel small Schmorl's |               |
|  nodes are present. Modic type II  changes are seen throughout the     |               |
| lower thoracic spine and lumbar spine. There is  mild anterolisthesis  |               |
| of L4 over L5.      Disc desiccation is visualized throughout the      |               |
| lower thoracic spine and lumbar  spine. Moderate disc narrowing is at  |               |
 
| L2-3. There is severe disc narrowing at  L3-4.      Imaged spinal cord |               |
|  and cauda equina demonstrate normal signal with no evidence  for      |               |
| myelomalacia or mass lesions. The conus medullaris terminates at level |               |
|  L1-2,  which is normal.     There is severe atrophy of the bilateral  |               |
| kidneys with presence of small cysts  demonstrate high T2 signal.      |               |
| Sagittal images demonstrate small posterior disc bulges of the lower   |               |
| thoracic  spine with no significant stenosis.     L1-2: A 2 mm         |               |
| posterior disc bulge is present along with mild facet hypertrophy  and |               |
|  ligamentum flavum hypertrophy. There is prominent posterior epidural  |               |
| fat. No  central canal stenosis is seen. Mild right neural foraminal   |               |
| canal stenosis is  observed.     L2-3: A 7 mm posterior disc bulge is  |               |
| present along with moderate facet  hypertrophy, ligamentum flavum      |               |
| hypertrophy, and prominent posterior epidural  fat. Overall, there is  |               |
| moderate to severe central stenosis. The AP dimension is  7 mm. Severe |               |
|  right and moderate left neural foraminal canal stenoses are seen.     |               |
| There is encroachment upon the right L2 nerve.     L3-4: A 2 mm        |               |
| posterior disc bulge is present with tearing of the annulus. There  is |               |
|  moderate facet hypertrophy and ligamentum flavum hypertrophy. No      |               |
| central  canal stenosis is seen. Mild right neural foraminal canal     |               |
| stenosis is observed.      L4-5: A 4 mm posterior disc bulge is        |               |
| present with tearing of the annulus. Severe  facet hypertrophy is      |               |
| seen. Overall, there is severe central canal stenosis. The  AP         |               |
| dimension is 5 mm. Mild right and moderate left neural foraminal canal |               |
|   stenoses are present with encroachment upon the left L4 nerve root.  |               |
|      L5-S1: A 3 mm posterior disc bulge is present along with moderate |               |
|  facet  hypertrophy. There is mild central canal stenosis. The AP      |               |
| dimension is 9 mm.  Mild bilateral neural foraminal canal stenoses are |               |
|  seen. There is encroachment  upon the right L5 nerve root.            |               |
| IMPRESSION -  L4-5: A 4 mm posterior disc bulge is present with        |               |
| tearing of the annulus. Severe  facet hypertrophy is seen. Overall,    |               |
| there is severe central canal stenosis. The  AP dimension is 5 mm.     |               |
| Mild right and moderate left neural foraminal canal  stenoses are      |               |
| present with encroachment upon the left L4 nerve root.      L2-3: A 7  |               |
| mm posterior disc bulge is present along with moderate facet           |               |
| hypertrophy, ligamentum flavum hypertrophy, and prominent posterior    |               |
| epidural  fat. Overall, there is moderate to severe central stenosis.  |               |
| The AP dimension is  7 mm. Severe right and moderate left neural       |               |
| foraminal canal stenoses are seen.  There is encroachment upon the     |               |
| right L2 nerve.     L5-S1: A 3 mm posterior disc bulge is present      |               |
| along with moderate facet  hypertrophy. There is mild central canal    |               |
| stenosis. The AP dimension is 9 mm.  Mild bilateral neural foraminal   |               |
| canal stenoses are seen. There is encroachment  upon the right L5      |               |
| nerve root.      Lesser degenerative changes as described above.       |               |
| Chronic moderate to severe compression fracture of T11.                |               |
|      Dictated and Signed by: Derek Reed MD    Electronically        |               |
| signed: 9/10/2014 3:35 PM                                              |               |
+------------------------------------------------------------------------+---------------+
 
 
 
+------------------------------------------------------------------------------------------+
| Procedure Note                                                                           |
+------------------------------------------------------------------------------------------+
|   Ralf, Rad Results In - 09/10/2014  3:38 PM PDT  MRI LUMBAR SPINE WO CONTRAST 9/10/2014  |
| 2:31 PM HISTORY: Lumbar sprain and lumbago.COMPARISON: None.PROTOCOL: Sagittal T2,       |
| sagittal T1, axial T2, axial T1, sagittal STIR, coronalT2.FINDINGS:There is mild         |
| levoscoliosis of the lumbar spine. A moderate to severecompression fracture is           |
| visualized of T11 with no significant osseous edema,likely chronic. Multilevel small     |
| Schmorl's nodes are present. Modic type IIchanges are seen throughout the lower thoracic |
|  spine and lumbar spine. There ismild anterolisthesis of L4 over L5. Disc desiccation is |
 
|  visualized throughout the lower thoracic spine and lumbarspine. Moderate disc narrowing |
|  is at L2-3. There is severe disc narrowing atL3-4. Imaged spinal cord and cauda equina  |
| demonstrate normal signal with no evidencefor myelomalacia or mass lesions. The conus    |
| medullaris terminates at level L1-2,which is normal.There is severe atrophy of the       |
| bilateral kidneys with presence of small cystsdemonstrate high T2 signal.Sagittal images |
|  demonstrate small posterior disc bulges of the lower thoracicspine with no significant  |
| stenosis.L1-2: A 2 mm posterior disc bulge is present along with mild facet              |
| hypertrophyand ligamentum flavum hypertrophy. There is prominent posterior epidural fat. |
|  Nocentral canal stenosis is seen. Mild right neural foraminal canal stenosis            |
| isobserved.L2-3: A 7 mm posterior disc bulge is present along with moderate              |
| facethypertrophy, ligamentum flavum hypertrophy, and prominent posterior epiduralfat.    |
| Overall, there is moderate to severe central stenosis. The AP dimension is7 mm. Severe   |
| right and moderate left neural foraminal canal stenoses are seen.There is encroachment   |
| upon the right L2 nerve.L3-4: A 2 mm posterior disc bulge is present with tearing of the |
|  annulus. Thereis moderate facet hypertrophy and ligamentum flavum hypertrophy. No       |
| centralcanal stenosis is seen. Mild right neural foraminal canal stenosis is observed.   |
| L4-5: A 4 mm posterior disc bulge is present with tearing of the annulus. Severefacet    |
| hypertrophy is seen. Overall, there is severe central canal stenosis. TheAP dimension is |
|  5 mm. Mild right and moderate left neural foraminal canalstenoses are present with      |
| encroachment upon the left L4 nerve root. L5-S1: A 3 mm posterior disc bulge is present  |
| along with moderate facethypertrophy. There is mild central canal stenosis. The AP       |
| dimension is 9 mm.Mild bilateral neural foraminal canal stenoses are seen. There is      |
| encroachmentupon the right L5 nerve root. IMPRESSION -L4-5: A 4 mm posterior disc bulge  |
| is present with tearing of the annulus. Severefacet hypertrophy is seen. Overall, there  |
| is severe central canal stenosis. TheAP dimension is 5 mm. Mild right and moderate left  |
| neural foraminal canalstenoses are present with encroachment upon the left L4 nerve      |
| root. L2-3: A 7 mm posterior disc bulge is present along with moderate facethypertrophy, |
|  ligamentum flavum hypertrophy, and prominent posterior epiduralfat. Overall, there is   |
| moderate to severe central stenosis. The AP dimension is7 mm. Severe right and moderate  |
| left neural foraminal canal stenoses are seen.There is encroachment upon the right L2    |
| nerve.L5-S1: A 3 mm posterior disc bulge is present along with moderate                  |
| facethypertrophy. There is mild central canal stenosis. The AP dimension is 9 mm.Mild    |
| bilateral neural foraminal canal stenoses are seen. There is encroachmentupon the right  |
| L5 nerve root. Lesser degenerative changes as described above.Chronic moderate to severe |
|  compression fracture of T11.Dictated and Signed by: Derek Reed MD  Electronically     |
| signed: 9/10/2014 3:35 PM                                                                |
|canal stenosis is seen. Mild right neural foraminal canal stenosis is observed.           |
|                                                                                          |
|L4-5: A 4 mm posterior disc bulge is present with tearing of the annulus. Severe          |
|facet hypertrophy is seen. Overall, there is severe central canal stenosis. The           |
|AP dimension is 5 mm. Mild right and moderate left neural foraminal canal                 |
|stenoses are present with encroachment upon the left L4 nerve root.                       |
|                                                                                          |
|L5-S1: A 3 mm posterior disc bulge is present along with moderate facet                   |
|hypertrophy. There is mild central canal stenosis. The AP dimension is 9 mm.             |
|Mild bilateral neural foraminal canal stenoses are seen. There is encroachment            |
|upon the right L5 nerve root.                                                             |
|                                                                                          |
|IMPRESSION -                                                                              |
|L4-5: A 4 mm posterior disc bulge is present with tearing of the annulus. Severe          |
|facet hypertrophy is seen. Overall, there is severe central canal stenosis. The           |
|AP dimension is 5 mm. Mild right and moderate left neural foraminal canal                 |
|stenoses are present with encroachment upon the left L4 nerve root.                       |
|                                                                                          |
|L2-3: A 7 mm posterior disc bulge is present along with moderate facet                    |
|hypertrophy, ligamentum flavum hypertrophy, and prominent posterior epidural              |
|fat. Overall, there is moderate to severe central stenosis. The AP dimension is          |
|7 mm. Severe right and moderate left neural foraminal canal stenoses are seen.            |
|There is encroachment upon the right L2 nerve.                                            |
|                                                                                          |
 
|L5-S1: A 3 mm posterior disc bulge is present along with moderate facet                   |
|hypertrophy. There is mild central canal stenosis. The AP dimension is 9 mm.             |
|Mild bilateral neural foraminal canal stenoses are seen. There is encroachment            |
|upon the right L5 nerve root.                                                             |
|                                                                                          |
|Lesser degenerative changes as described above.                                           |
|                                                                                          |
|Chronic moderate to severe compression fracture of T11.                                   |
|                                                                                          |
|Dictated and Signed by: Derek Reed MD                                                   |
| Electronically signed: 9/10/2014 3:35 PM                                                 |
+------------------------------------------------------------------------------------------+
 
 
 
+---------------+---------+--------------------+--------------+
| Performing    | Address | City/State/Zipcode | Phone Number |
| Organization  |         |                    |              |
+---------------+---------+--------------------+--------------+
|   [facility] |         |                    |              |
+---------------+---------+--------------------+--------------+
 documented in this encounter
 
 Visit Diagnoses
 
 
+----------------------------------------------+
| Diagnosis                                    |
+----------------------------------------------+
|   Sprain of lumbar region, initial encounter |
+----------------------------------------------+
|   Lumbago                                    |
+----------------------------------------------+
 documented in this encounter

## 2020-01-08 NOTE — XMS
Encounter Summary
  Created on: 2020
 
 Viviana Ricci
 External Reference #: [id number]
 : 47
 Sex: Female
 
 Demographics
 
 
+-----------------------+----------------------+
| Address               | [address]      |
|                       | [place]  [address] |
+-----------------------+----------------------+
| Home Phone            | [phone]      |
+-----------------------+----------------------+
| Preferred Language    | Unknown              |
+-----------------------+----------------------+
| Marital Status        | Single               |
+-----------------------+----------------------+
| Pentecostalism Affiliation | [id number]                 |
+-----------------------+----------------------+
| Race                  | Unknown              |
+-----------------------+----------------------+
| Ethnic Group          | Unknown              |
+-----------------------+----------------------+
 
 
 Author
 
 
+--------------+--------------------------------------------+
| Author       | [facility]  |
|              | and [place]                                |
+--------------+--------------------------------------------+
| Organization | [facility]  |
|              | and [place]                                |
+--------------+--------------------------------------------+
| Address      | Unknown                                    |
+--------------+--------------------------------------------+
| Phone        | Unavailable                                |
+--------------+--------------------------------------------+
 
 
 
 Support
 
 
+----------------+--------------+---------------------+-----------------+
| Name           | Relationship | Address             | Phone           |
+----------------+--------------+---------------------+-----------------+
| Ada/Ed Radhames | ECON         | [place]              | [phone] |
|                |              | [place]  |                 |
|                |              |  [address]              |                 |
+----------------+--------------+---------------------+-----------------+
 
 
 
 
 Care Team Providers
 
 
+-----------------------+------+-------------+
| Care Team Member Name | Role | Phone       |
+-----------------------+------+-------------+
 PCP  | Unavailable |
+-----------------------+------+-------------+
 
 
 
 Reason for Visit
 
 
+-------------------+----------+
| Reason            | Comments |
+-------------------+----------+
| Medication Refill |          |
+-------------------+----------+
 
 
 
 Encounter Details
 
 
+--------+--------+---------------------+----------------------+-------------------+
| Date   | Type   | Department          | Care Team            | Description       |
+--------+--------+---------------------+----------------------+-------------------+
| / | Refill |   [facility]         |   Marzena Moser  | Medication Refill |
|    |        | NEPHROLOGY  [address]   | M, DO  [address]      |                   |
|        |        | [address]   | [address]      |                   |
|        |        | [place], [place]     | [place], [place]      |                   |
|        |        | [address]          | [address]  [phone]  |                   |
|        |        | [phone]        |  [phone] (Fax)  |                   |
+--------+--------+---------------------+----------------------+-------------------+
 
 
 
 Social History
 
 
+--------------+-------+-----------+--------+------+
| Tobacco Use  | Types | Packs/Day | Years  | Date |
|              |       |           | Used   |      |
+--------------+-------+-----------+--------+------+
| Never Smoker |       |           |        |      |
+--------------+-------+-----------+--------+------+
 
 
 
+---------------------+---+---+---+
| Smokeless Tobacco:  |   |   |   |
| Never Used          |   |   |   |
+---------------------+---+---+---+
 
 
 
+---------------------------------------------------------------+
| Comments: some second hand smoke exposure, but fairly minimal |
 
+---------------------------------------------------------------+
 
 
 
+-------------+-------------+---------+----------+
| Alcohol Use | Drinks/Week | oz/Week | Comments |
+-------------+-------------+---------+----------+
| No          |             |         |          |
+-------------+-------------+---------+----------+
 
 
 
+------------------+---------------+
| Sex Assigned at  | Date Recorded |
| Birth            |               |
+------------------+---------------+
| Not on file      |               |
+------------------+---------------+
 
 
 
+----------------+-------------+-------------+
| Job Start Date | Occupation  | Industry    |
+----------------+-------------+-------------+
| Not on file    | Not on file | Not on file |
+----------------+-------------+-------------+
 
 
 
+----------------+--------------+------------+
| Travel History | Travel Start | Travel End |
+----------------+--------------+------------+
 
 
 
+-------------------------------------+
| No recent travel history available. |
+-------------------------------------+
 documented as of this encounter
 
 Plan of Treatment
 
 
+--------+-----------+------------+----------------------+-------------------+
| Date   | Type      | Specialty  | Care Team            | Description       |
+--------+-----------+------------+----------------------+-------------------+
| / | Office    | Cardiology |   HellalfredoTonia,    |                   |
|    | Visit     |            | ARNP  [address]   |                   |
|        |           |            | St  [place], [place]  |                   |
|        |           |            | [address]  [phone]  |                   |
|        |           |            |  [phone] (Fax)  |                   |
+--------+-----------+------------+----------------------+-------------------+
| / | Hospital  | Radiology  |   Popeye Townsend, |                   |
|    | Encounter |            |  MD  [address] |                   |
|        |           |            |  St.  [place],   |                   |
|        |           |            | [place] [address]             |                   |
|        |           |            | [phone]         |                   |
|        |           |            | [phone] (Fax)   |                   |
+--------+-----------+------------+----------------------+-------------------+
| / | Surgery   | Radiology  |   Popeye Townsend, | CV EP PPM SYSTEM  |
 
|    |           |            |  MD  [address] | IMPLANT           |
|        |           |            |  St.  [place],   |                   |
|        |           |            | [place] [address]             |                   |
|        |           |            | [phone]         |                   |
|        |           |            | [phone] (Fax)   |                   |
+--------+-----------+------------+----------------------+-------------------+
| 02/10/ | Clinical  | Cardiology |                      |                   |
|    | Support   |            |                      |                   |
+--------+-----------+------------+----------------------+-------------------+
| / | Office    | Cardiology |   Tonia Wilson,    |                   |
|    | Visit     |            | ARNP  [address]   |                   |
|        |           |            | [place]  |                   |
|        |           |            | [address]  [phone]  |                   |
|        |           |            |  [phone] (Fax)  |                   |
+--------+-----------+------------+----------------------+-------------------+
| / | Off-Site  | Nephrology |   Marzena Moser  |                   |
|    | Visit     |            | DO ETIENNE  [address] [place] [address]      |                   |
|        |           |            | [address]      |                   |
|        |           |            | [place]      |                   |
|        |           |            | [address]  [phone]  |                   |
|        |           |            |  [phone] (Fax)  |                   |
+--------+-----------+------------+----------------------+-------------------+
 documented as of this encounter
 
 Visit Diagnoses
 Not on filedocumented in this encounter

## 2020-01-08 NOTE — XMS
Encounter Summary
  Created on: 2020
 
 Viviana Ricci
 External Reference #: [id number]
 : 47
 Sex: Female
 
 Demographics
 
 
+-----------------------+----------------------+
| Address               | [address]      |
|                       | [place]  [address] |
+-----------------------+----------------------+
| Home Phone            | [phone]      |
+-----------------------+----------------------+
| Preferred Language    | Unknown              |
+-----------------------+----------------------+
| Marital Status        | Single               |
+-----------------------+----------------------+
| Rastafarian Affiliation | [id number]                 |
+-----------------------+----------------------+
| Race                  | Unknown              |
+-----------------------+----------------------+
| Ethnic Group          | Unknown              |
+-----------------------+----------------------+
 
 
 Author
 
 
+--------------+--------------------------------------------+
| Author       | [facility]  |
|              | and [place]                                |
+--------------+--------------------------------------------+
| Organization | [facility]  |
|              | and [place]                                |
+--------------+--------------------------------------------+
| Address      | Unknown                                    |
+--------------+--------------------------------------------+
| Phone        | Unavailable                                |
+--------------+--------------------------------------------+
 
 
 
 Support
 
 
+----------------+--------------+---------------------+-----------------+
| Name           | Relationship | Address             | Phone           |
+----------------+--------------+---------------------+-----------------+
| Ada/Ed Radhames | ECON         | [place]              | [phone] |
|                |              | [place]  |                 |
|                |              |  [address]              |                 |
+----------------+--------------+---------------------+-----------------+
 
 
 
 
 Care Team Providers
 
 
+-----------------------+------+-------------+
| Care Team Member Name | Role | Phone       |
+-----------------------+------+-------------+
 PCP  | Unavailable |
+-----------------------+------+-------------+
 
 
 
 Reason for Visit
 
 
+----------+----------+
| Reason   | Comments |
+----------+----------+
| Wheezing |          |
+----------+----------+
 
 
 
 Encounter Details
 
 
+--------+-----------+---------------------+----------------------+-------------+
| Date   | Type      | Department          | Care Team            | Description |
+--------+-----------+---------------------+----------------------+-------------+
| / | Telephone |   [facility] [place]         |   Marzena Moser  | Wheezing    |
|    |           | NEPHROLOGY  [address]   | M, DO  [address]      |             |
|        |           | [address]   | [address]      |             |
|        |           | [place], [place]     | [place], [place]      |             |
|        |           | [address]          | [address]  [phone]  |             |
|        |           | [phone]        |  [phone] (Fax)  |             |
+--------+-----------+---------------------+----------------------+-------------+
 
 
 
 Social History
 
 
+--------------+-------+-----------+--------+------+
| Tobacco Use  | Types | Packs/Day | Years  | Date |
|              |       |           | Used   |      |
+--------------+-------+-----------+--------+------+
| Never Smoker |       |           |        |      |
+--------------+-------+-----------+--------+------+
 
 
 
+---------------------+---+---+---+
| Smokeless Tobacco:  |   |   |   |
| Never Used          |   |   |   |
+---------------------+---+---+---+
 
 
 
+---------------------------------------------------------------+
| Comments: some second hand smoke exposure, but fairly minimal |
 
+---------------------------------------------------------------+
 
 
 
+-------------+-------------+---------+----------+
| Alcohol Use | Drinks/Week | oz/Week | Comments |
+-------------+-------------+---------+----------+
| No          |             |         |          |
+-------------+-------------+---------+----------+
 
 
 
+------------------+---------------+
| Sex Assigned at  | Date Recorded |
| Birth            |               |
+------------------+---------------+
| Not on file      |               |
+------------------+---------------+
 
 
 
+----------------+-------------+-------------+
| Job Start Date | Occupation  | Industry    |
+----------------+-------------+-------------+
| Not on file    | Not on file | Not on file |
+----------------+-------------+-------------+
 
 
 
+----------------+--------------+------------+
| Travel History | Travel Start | Travel End |
+----------------+--------------+------------+
 
 
 
+-------------------------------------+
| No recent travel history available. |
+-------------------------------------+
 documented as of this encounter
 
 Plan of Treatment
 
 
+--------+-----------+------------+----------------------+-------------------+
| Date   | Type      | Specialty  | Care Team            | Description       |
+--------+-----------+------------+----------------------+-------------------+
| / | Office    | Cardiology |   Tonia Wilson,    |                   |
| 2020   | Visit     |            | ARNP  [address]   |                   |
|        |           |            | St  [place]  |                   |
|        |           |            | [address]  [phone]  |                   |
|        |           |            |  [phone] (Fax)  |                   |
+--------+-----------+------------+----------------------+-------------------+
| / | Hospital  | Radiology  |   Popeye Townsend, |                   |
|    | Encounter |            |  MD  [address] |                   |
|        |           |            |  St.  [place],   |                   |
|        |           |            | [place] [address]             |                   |
|        |           |            | [phone]         |                   |
|        |           |            | [phone] (Fax)   |                   |
+--------+-----------+------------+----------------------+-------------------+
| / | Surgery   | Radiology  |   Popeye Townsend, | CV EP PPM SYSTEM  |
 
|    |           |            |  MD  [address] | IMPLANT           |
|        |           |            |  St.  [place],   |                   |
|        |           |            | [place] [address]             |                   |
|        |           |            | [phone]         |                   |
|        |           |            | [phone] (Fax)   |                   |
+--------+-----------+------------+----------------------+-------------------+
| 02/10/ | Clinical  | Cardiology |                      |                   |
|    | Support   |            |                      |                   |
+--------+-----------+------------+----------------------+-------------------+
| / | Office    | Cardiology |   Tonia Wilson,    |                   |
|    | Visit     |            | BELKIS  [address]   |                   |
|        |           |            | [place]  |                   |
|        |           |            | [address]  [phone]  |                   |
|        |           |            |  [phone] (Fax)  |                   |
+--------+-----------+------------+----------------------+-------------------+
| / | Off-Site  | Nephrology |   Marzena Moser  |                   |
|    | Visit     |            | DO ETINENE  [address] [place] [address]      |                   |
|        |           |            | [address]      |                   |
|        |           |            | [place]      |                   |
|        |           |            | [address]  [phone]  |                   |
|        |           |            |  [phone] (Fax)  |                   |
+--------+-----------+------------+----------------------+-------------------+
 documented as of this encounter
 
 Visit Diagnoses
 Not on filedocumented in this encounter

## 2020-01-08 NOTE — XMS
Encounter Summary
  Created on: 2020
 
 Viviaan Ricci
 External Reference #: [id number]
 : 47
 Sex: Female
 
 Demographics
 
 
+-----------------------+----------------------+
| Address               | [address]      |
|                       | [place]  [address] |
+-----------------------+----------------------+
| Home Phone            | [phone]      |
+-----------------------+----------------------+
| Preferred Language    | Unknown              |
+-----------------------+----------------------+
| Marital Status        | Single               |
+-----------------------+----------------------+
| Evangelical Affiliation | [id number]                 |
+-----------------------+----------------------+
| Race                  | Unknown              |
+-----------------------+----------------------+
| Ethnic Group          | Unknown              |
+-----------------------+----------------------+
 
 
 Author
 
 
+--------------+--------------------------------------------+
| Author       | Merged with [facility]  |
|              | and [facility]                                |
+--------------+--------------------------------------------+
| Organization | Merged with [facility]  |
|              | and [facility]                                |
+--------------+--------------------------------------------+
| Address      | Unknown                                    |
+--------------+--------------------------------------------+
| Phone        | Unavailable                                |
+--------------+--------------------------------------------+
 
 
 
 Support
 
 
+----------------+--------------+---------------------+-----------------+
| Name           | Relationship | Address             | Phone           |
+----------------+--------------+---------------------+-----------------+
| Ada/Ed Radhames | ECON         | [place]              | [phone] |
|                |              | [place]  |                 |
|                |              |  [address]              |                 |
+----------------+--------------+---------------------+-----------------+
 
 
 
 
 Care Team Providers
 
 
+-----------------------+------+-------------+
| Care Team Member Name | Role | Phone       |
+-----------------------+------+-------------+
 PCP  | Unavailable |
+-----------------------+------+-------------+
 
 
 
 Reason for Visit
 
 
+-------------------+----------+
| Reason            | Comments |
+-------------------+----------+
| Medication Refill |          |
+-------------------+----------+
 
 
 
 Encounter Details
 
 
+--------+--------+---------------------+----------------------+-------------------+
| Date   | Type   | Department          | Care Team            | Description       |
+--------+--------+---------------------+----------------------+-------------------+
| 10/01/ | Refill |   [facility]         |   Marzena Moser  | Medication Refill |
|    |        | NEPHROLOGY  [address]   | M, DO  [address]      |                   |
|        |        | [address]   | [address]      |                   |
|        |        | [place], [place]     | [place], [place]      |                   |
|        |        | [address]          | [address]  [phone]  |                   |
|        |        | [phone]        |  [phone] (Fax)  |                   |
+--------+--------+---------------------+----------------------+-------------------+
 
 
 
 Social History
 
 
+--------------+-------+-----------+--------+------+
| Tobacco Use  | Types | Packs/Day | Years  | Date |
|              |       |           | Used   |      |
+--------------+-------+-----------+--------+------+
| Never Smoker |       |           |        |      |
+--------------+-------+-----------+--------+------+
 
 
 
+---------------------+---+---+---+
| Smokeless Tobacco:  |   |   |   |
| Never Used          |   |   |   |
+---------------------+---+---+---+
 
 
 
+---------------------------------------------------------------+
| Comments: some second hand smoke exposure, but fairly minimal |
 
+---------------------------------------------------------------+
 
 
 
+-------------+-------------+---------+----------+
| Alcohol Use | Drinks/Week | oz/Week | Comments |
+-------------+-------------+---------+----------+
| No          |             |         |          |
+-------------+-------------+---------+----------+
 
 
 
+------------------+---------------+
| Sex Assigned at  | Date Recorded |
| Birth            |               |
+------------------+---------------+
| Not on file      |               |
+------------------+---------------+
 
 
 
+----------------+-------------+-------------+
| Job Start Date | Occupation  | Industry    |
+----------------+-------------+-------------+
| Not on file    | Not on file | Not on file |
+----------------+-------------+-------------+
 
 
 
+----------------+--------------+------------+
| Travel History | Travel Start | Travel End |
+----------------+--------------+------------+
 
 
 
+-------------------------------------+
| No recent travel history available. |
+-------------------------------------+
 documented as of this encounter
 
 Plan of Treatment
 
 
+--------+-----------+------------+----------------------+-------------------+
| Date   | Type      | Specialty  | Care Team            | Description       |
+--------+-----------+------------+----------------------+-------------------+
| / | Office    | Cardiology |   HellalfredoTonia,    |                   |
|    | Visit     |            | ARNP  [address]   |                   |
|        |           |            | St  [place], [place]  |                   |
|        |           |            | [address]  [phone]  |                   |
|        |           |            |  [phone] (Fax)  |                   |
+--------+-----------+------------+----------------------+-------------------+
| / | Hospital  | Radiology  |   Popeye Townsend, |                   |
|    | Encounter |            |  MD  [address] |                   |
|        |           |            |  St.  [place],   |                   |
|        |           |            | [place] [address]             |                   |
|        |           |            | [phone]         |                   |
|        |           |            | [phone] (Fax)   |                   |
+--------+-----------+------------+----------------------+-------------------+
| / | Surgery   | Radiology  |   Popeye Townsend, | CV EP PPM SYSTEM  |
 
|    |           |            |  MD  [address] | IMPLANT           |
|        |           |            |  St.  [place],   |                   |
|        |           |            | [place] [address]             |                   |
|        |           |            | [phone]         |                   |
|        |           |            | [phone] (Fax)   |                   |
+--------+-----------+------------+----------------------+-------------------+
| 02/10/ | Clinical  | Cardiology |                      |                   |
|    | Support   |            |                      |                   |
+--------+-----------+------------+----------------------+-------------------+
| / | Office    | Cardiology |   Tonia Wilson,    |                   |
|    | Visit     |            | ARNP  [address]   |                   |
|        |           |            | [place]  |                   |
|        |           |            | [address]  [phone]  |                   |
|        |           |            |  [phone] (Fax)  |                   |
+--------+-----------+------------+----------------------+-------------------+
| / | Off-Site  | Nephrology |   Marzena Moser  |                   |
|    | Visit     |            | DO ETIENNE  [address] [place] [address]      |                   |
|        |           |            | [address]      |                   |
|        |           |            | [place]      |                   |
|        |           |            | [address]  [phone]  |                   |
|        |           |            |  [phone] (Fax)  |                   |
+--------+-----------+------------+----------------------+-------------------+
 documented as of this encounter
 
 Visit Diagnoses
 Not on filedocumented in this encounter

## 2020-01-08 NOTE — XMS
Encounter Summary
  Created on: 2020
 
 Viviana Ricci
 External Reference #: [id number]
 : 47
 Sex: Female
 
 Demographics
 
 
+-----------------------+----------------------+
| Address               | [address]      |
|                       | [place]  [address] |
+-----------------------+----------------------+
| Home Phone            | [phone]      |
+-----------------------+----------------------+
| Preferred Language    | Unknown              |
+-----------------------+----------------------+
| Marital Status        | Single               |
+-----------------------+----------------------+
| Cheondoism Affiliation | [id number]                 |
+-----------------------+----------------------+
| Race                  | Unknown              |
+-----------------------+----------------------+
| Ethnic Group          | Unknown              |
+-----------------------+----------------------+
 
 
 Author
 
 
+--------------+--------------------------------------------+
| Author       | [facility]  |
|              | and [facility]                                |
+--------------+--------------------------------------------+
| Organization | [facility]  |
|              | and [facility]                                |
+--------------+--------------------------------------------+
| Address      | Unknown                                    |
+--------------+--------------------------------------------+
| Phone        | Unavailable                                |
+--------------+--------------------------------------------+
 
 
 
 Support
 
 
+----------------+--------------+---------------------+-----------------+
| Name           | Relationship | Address             | Phone           |
+----------------+--------------+---------------------+-----------------+
| Ada/Ed Radhames | ECON         | [place]              | [phone] |
|                |              | [place]  |                 |
|                |              |  [address]              |                 |
+----------------+--------------+---------------------+-----------------+
 
 
 
 
 Care Team Providers
 
 
+-----------------------+------+-------------+
| Care Team Member Name | Role | Phone       |
+-----------------------+------+-------------+
 PCP  | Unavailable |
+-----------------------+------+-------------+
 
 
 
 Reason for Visit
 
 
+-------------------+----------+
| Reason            | Comments |
+-------------------+----------+
| Medication Refill |          |
+-------------------+----------+
 
 
 
 Encounter Details
 
 
+--------+--------+---------------------+----------------------+-------------------+
| Date   | Type   | Department          | Care Team            | Description       |
+--------+--------+---------------------+----------------------+-------------------+
| / | Refill |   [facility]         |   Marzena Moser  | Medication Refill |
|    |        | NEPHROLOGY  [address]   | M, DO  [address]      |                   |
|        |        | [address]   | [address]      |                   |
|        |        | [place], [place]     | [place], [place]      |                   |
|        |        | [address]          | [address]  [phone]  |                   |
|        |        | [phone]        |  [phone] (Fax)  |                   |
+--------+--------+---------------------+----------------------+-------------------+
 
 
 
 Social History
 
 
+--------------+-------+-----------+--------+------+
| Tobacco Use  | Types | Packs/Day | Years  | Date |
|              |       |           | Used   |      |
+--------------+-------+-----------+--------+------+
| Never Smoker |       |           |        |      |
+--------------+-------+-----------+--------+------+
 
 
 
+---------------------+---+---+---+
| Smokeless Tobacco:  |   |   |   |
| Never Used          |   |   |   |
+---------------------+---+---+---+
 
 
 
+---------------------------------------------------------------+
| Comments: some second hand smoke exposure, but fairly minimal |
 
+---------------------------------------------------------------+
 
 
 
+-------------+-------------+---------+----------+
| Alcohol Use | Drinks/Week | oz/Week | Comments |
+-------------+-------------+---------+----------+
| No          |             |         |          |
+-------------+-------------+---------+----------+
 
 
 
+------------------+---------------+
| Sex Assigned at  | Date Recorded |
| Birth            |               |
+------------------+---------------+
| Not on file      |               |
+------------------+---------------+
 
 
 
+----------------+-------------+-------------+
| Job Start Date | Occupation  | Industry    |
+----------------+-------------+-------------+
| Not on file    | Not on file | Not on file |
+----------------+-------------+-------------+
 
 
 
+----------------+--------------+------------+
| Travel History | Travel Start | Travel End |
+----------------+--------------+------------+
 
 
 
+-------------------------------------+
| No recent travel history available. |
+-------------------------------------+
 documented as of this encounter
 
 Plan of Treatment
 
 
+--------+-----------+------------+----------------------+-------------------+
| Date   | Type      | Specialty  | Care Team            | Description       |
+--------+-----------+------------+----------------------+-------------------+
| / | Office    | Cardiology |   HellalfredoTonia,    |                   |
|    | Visit     |            | ARNP  [address]   |                   |
|        |           |            | St  [place], [place]  |                   |
|        |           |            | [address]  [phone]  |                   |
|        |           |            |  [phone] (Fax)  |                   |
+--------+-----------+------------+----------------------+-------------------+
| / | Hospital  | Radiology  |   Popeye Townsend, |                   |
|    | Encounter |            |  MD  [address] |                   |
|        |           |            |  St.  [place],   |                   |
|        |           |            | [place] [address]             |                   |
|        |           |            | [phone]         |                   |
|        |           |            | [phone] (Fax)   |                   |
+--------+-----------+------------+----------------------+-------------------+
| / | Surgery   | Radiology  |   Popeye Townsend, | CV EP PPM SYSTEM  |
 
|    |           |            |  MD  [address] | IMPLANT           |
|        |           |            |  St.  [place],   |                   |
|        |           |            | [place] [address]             |                   |
|        |           |            | [phone]         |                   |
|        |           |            | [phone] (Fax)   |                   |
+--------+-----------+------------+----------------------+-------------------+
| 02/10/ | Clinical  | Cardiology |                      |                   |
|    | Support   |            |                      |                   |
+--------+-----------+------------+----------------------+-------------------+
| / | Office    | Cardiology |   Tonia Wilson,    |                   |
|    | Visit     |            | ARNP  [address]   |                   |
|        |           |            | [place]  |                   |
|        |           |            | [address]  [phone]  |                   |
|        |           |            |  [phone] (Fax)  |                   |
+--------+-----------+------------+----------------------+-------------------+
| / | Off-Site  | Nephrology |   Marzena Moser  |                   |
|    | Visit     |            | DO ETIENNE  [address] [place] [address]      |                   |
|        |           |            | [address]      |                   |
|        |           |            | [place]      |                   |
|        |           |            | [address]  [phone]  |                   |
|        |           |            |  [phone] (Fax)  |                   |
+--------+-----------+------------+----------------------+-------------------+
 documented as of this encounter
 
 Visit Diagnoses
 Not on filedocumented in this encounter

## 2020-01-08 NOTE — XMS
Encounter Summary
  Created on: 2020
 
 Viviana Ricci
 External Reference #: [id number]
 : 47
 Sex: Female
 
 Demographics
 
 
+-----------------------+----------------------+
| Address               | [address]      |
|                       | [place]  [address] |
+-----------------------+----------------------+
| Home Phone            | [phone]      |
+-----------------------+----------------------+
| Preferred Language    | Unknown              |
+-----------------------+----------------------+
| Marital Status        | Single               |
+-----------------------+----------------------+
| Confucianist Affiliation | [id number]                 |
+-----------------------+----------------------+
| Race                  | Unknown              |
+-----------------------+----------------------+
| Ethnic Group          | Unknown              |
+-----------------------+----------------------+
 
 
 Author
 
 
+--------------+--------------------------------------------+
| Author       | [facility]  |
|              | and [facility]                                |
+--------------+--------------------------------------------+
| Organization | [facility]  |
|              | and [facility]                                |
+--------------+--------------------------------------------+
| Address      | Unknown                                    |
+--------------+--------------------------------------------+
| Phone        | Unavailable                                |
+--------------+--------------------------------------------+
 
 
 
 Support
 
 
+----------------+--------------+---------------------+-----------------+
| Name           | Relationship | Address             | Phone           |
+----------------+--------------+---------------------+-----------------+
| Ada/Ed Radhames | ECON         | [address]              | [phone] |
|                |              | [place]  |                 |
|                |              |  [id number]              |                 |
+----------------+--------------+---------------------+-----------------+
 
 
 
 
 Care Team Providers
 
 
+-----------------------+------+-------------+
| Care Team Member Name | Role | Phone       |
+-----------------------+------+-------------+
 PCP  | Unavailable |
+-----------------------+------+-------------+
 
 
 
 Reason for Visit
 
 
+--------+----------+
| Reason | Comments |
+--------+----------+
| Other  |          |
+--------+----------+
 
 
 
 Encounter Details
 
 
+--------+-----------+----------------------+----------------------+-------------+
| Date   | Type      | Department           | Care Team            | Description |
+--------+-----------+----------------------+----------------------+-------------+
| / | Telephone |   [facility]          |   Maricruz Flanagan, | Other       |
|    |           | PULMONARY  [address]     |  RN                  |             |
|        |           | [address]  [place], |                      |             |
|        |           |  [place] [address]       |                      |             |
|        |           | [phone]         |                      |             |
+--------+-----------+----------------------+----------------------+-------------+
 
 
 
 Social History
 
 
+--------------+-------+-----------+--------+------+
| Tobacco Use  | Types | Packs/Day | Years  | Date |
|              |       |           | Used   |      |
+--------------+-------+-----------+--------+------+
| Never Smoker |       |           |        |      |
+--------------+-------+-----------+--------+------+
 
 
 
+---------------------+---+---+---+
| Smokeless Tobacco:  |   |   |   |
| Never Used          |   |   |   |
+---------------------+---+---+---+
 
 
 
+---------------------------------------------------------------+
| Comments: some second hand smoke exposure, but fairly minimal |
+---------------------------------------------------------------+
 
 
 
 
+-------------+-------------+---------+----------+
| Alcohol Use | Drinks/Week | oz/Week | Comments |
+-------------+-------------+---------+----------+
| No          |             |         |          |
+-------------+-------------+---------+----------+
 
 
 
+------------------+---------------+
| Sex Assigned at  | Date Recorded |
| Birth            |               |
+------------------+---------------+
| Not on file      |               |
+------------------+---------------+
 
 
 
+----------------+-------------+-------------+
| Job Start Date | Occupation  | Industry    |
+----------------+-------------+-------------+
| Not on file    | Not on file | Not on file |
+----------------+-------------+-------------+
 
 
 
+----------------+--------------+------------+
| Travel History | Travel Start | Travel End |
+----------------+--------------+------------+
 
 
 
+-------------------------------------+
| No recent travel history available. |
+-------------------------------------+
 documented as of this encounter
 
 Plan of Treatment
 
 
+--------+-----------+------------+----------------------+-------------------+
| Date   | Type      | Specialty  | Care Team            | Description       |
+--------+-----------+------------+----------------------+-------------------+
| / | Office    | Cardiology |   Tonia Wilson,    |                   |
|    | Visit     |            | ARNP  [address]   |                   |
|        |           |            | St  [place] [facility], [place]  |                   |
|        |           |            | [address]  [phone]  |                   |
|        |           |            |  [phone] (Fax)  |                   |
+--------+-----------+------------+----------------------+-------------------+
| / | Hospital  | Radiology  |   Popeye Townsend, |                   |
|    | Encounter |            |  MD  [address] |                   |
|        |           |            |  St.  [place],   |                   |
|        |           |            | [place] [address]             |                   |
|        |           |            | [phone]         |                   |
|        |           |            | [phone] (Fax)   |                   |
+--------+-----------+------------+----------------------+-------------------+
| / | Surgery   | Radiology  |   Popeye Townsend, | CV EP PPM SYSTEM  |
|    |           |            |  MD  [address] | IMPLANT           |
 
|        |           |            |  St.  [place],   |                   |
|        |           |            | [place] [address]             |                   |
|        |           |            | [phone]         |                   |
|        |           |            | [phone] (Fax)   |                   |
+--------+-----------+------------+----------------------+-------------------+
| 02/10/ | Clinical  | Cardiology |                      |                   |
|    | Support   |            |                      |                   |
+--------+-----------+------------+----------------------+-------------------+
| / | Office    | Cardiology |   Hellberg, Tonia,    |                   |
|    | Visit     |            | [facility]  [address]   |                   |
|        |           |            | [place]  |                   |
|        |           |            | [address]  [phone]  |                   |
|        |           |            |  [phone] (Fax)  |                   |
+--------+-----------+------------+----------------------+-------------------+
| / | Off-Site  | Nephrology |   Marzena Moser  |                   |
|    | Visit     |            | DO ETIENNE  [address] [place] [address]      |                   |
|        |           |            | [address]      |                   |
|        |           |            | [place]      |                   |
|        |           |            | [address] [phone]  |                   |
|        |           |            |  [phone] (Fax)  |                   |
+--------+-----------+------------+----------------------+-------------------+
 documented as of this encounter
 
 Visit Diagnoses
 Not on filedocumented in this encounter

## 2020-01-08 NOTE — XMS
Encounter Summary
  Created on: 2020
 
 Viviana Ricci
 External Reference #: [id number]
 : 47
 Sex: Female
 
 Demographics
 
 
+-----------------------+----------------------+
| Address               | [address]      |
|                       | [place]  [address] |
+-----------------------+----------------------+
| Home Phone            | [phone]      |
+-----------------------+----------------------+
| Preferred Language    | Unknown              |
+-----------------------+----------------------+
| Marital Status        | Single               |
+-----------------------+----------------------+
| Advent Affiliation | 1009                 |
+-----------------------+----------------------+
| Race                  | Unknown              |
+-----------------------+----------------------+
| Ethnic Group          | Unknown              |
+-----------------------+----------------------+
 
 
 Author
 
 
+--------------+--------------------------------------------+
| Author       | [facility]  |
|              | and [facility]                                |
+--------------+--------------------------------------------+
| Organization | [facility]  |
|              | and [place]                                |
+--------------+--------------------------------------------+
| Address      | Unknown                                    |
+--------------+--------------------------------------------+
| Phone        | Unavailable                                |
+--------------+--------------------------------------------+
 
 
 
 Support
 
 
+----------------+--------------+---------------------+-----------------+
| Name           | Relationship | Address             | Phone           |
+----------------+--------------+---------------------+-----------------+
| Ada/Ed Radhames | ECON         | [address]              | [phone] |
|                |              | [place]  |                 |
|                |              |  [address]              |                 |
+----------------+--------------+---------------------+-----------------+
 
 
 
 
 Care Team Providers
 
 
+------------------------+------+-----------------+
| Care Team Member Name  | Role | Phone           |
+------------------------+------+-----------------+
| Marzena Moser DO | PCP  | [phone] |
+------------------------+------+-----------------+
 
 
 
 Reason for Visit
 
 
+-------------------+----------+
| Reason            | Comments |
+-------------------+----------+
| Medication Refill |          |
+-------------------+----------+
 
 
 
 Encounter Details
 
 
+--------+--------+---------------------+----------------------+-------------------+
| Date   | Type   | Department          | Care Team            | Description       |
+--------+--------+---------------------+----------------------+-------------------+
| / | Refill |   [facility]         |   Marzena Moser  | Medication Refill |
| 2018   |        | NEPHROLOGY  [address]   | M, DO  [address]      |                   |
|        |        | [address]   | [address]      |                   |
|        |        | [place], [place]     | [place], [place]      |                   |
|        |        | [address]          | [address]  [phone]  |                   |
|        |        | [phone]        |  [phone] (Fax)  |                   |
+--------+--------+---------------------+----------------------+-------------------+
 
 
 
 Social History
 
 
+--------------+-------+-----------+--------+------+
| Tobacco Use  | Types | Packs/Day | Years  | Date |
|              |       |           | Used   |      |
+--------------+-------+-----------+--------+------+
| Never Smoker |       |           |        |      |
+--------------+-------+-----------+--------+------+
 
 
 
+---------------------+---+---+---+
| Smokeless Tobacco:  |   |   |   |
| Never Used          |   |   |   |
+---------------------+---+---+---+
 
 
 
+---------------------------------------------------------------+
| Comments: some second hand smoke exposure, but fairly minimal |
 
+---------------------------------------------------------------+
 
 
 
+-------------+-------------+---------+----------+
| Alcohol Use | Drinks/Week | oz/Week | Comments |
+-------------+-------------+---------+----------+
| No          |             |         |          |
+-------------+-------------+---------+----------+
 
 
 
+------------------+---------------+
| Sex Assigned at  | Date Recorded |
| Birth            |               |
+------------------+---------------+
| Not on file      |               |
+------------------+---------------+
 
 
 
+----------------+-------------+-------------+
| Job Start Date | Occupation  | Industry    |
+----------------+-------------+-------------+
| Not on file    | Not on file | Not on file |
+----------------+-------------+-------------+
 
 
 
+----------------+--------------+------------+
| Travel History | Travel Start | Travel End |
+----------------+--------------+------------+
 
 
 
+-------------------------------------+
| No recent travel history available. |
+-------------------------------------+
 documented as of this encounter
 
 Plan of Treatment
 
 
+--------+-----------+------------+----------------------+-------------------+
| Date   | Type      | Specialty  | Care Team            | Description       |
+--------+-----------+------------+----------------------+-------------------+
| / | Office    | Cardiology |   Tonia Wilson,    |                   |
|    | Visit     |            | ARNP  [address]   |                   |
|        |           |            | St [place], [place]  |                   |
|        |           |            | [address]  [phone]  |                   |
|        |           |            |  [phone] (Fax)  |                   |
+--------+-----------+------------+----------------------+-------------------+
| / | Hospital  | Radiology  |   Popeye Townsend, |                   |
|    | Encounter |            |  MD  [address] |                   |
|        |           |            |  [place],   |                   |
|        |           |            | [place] [address]             |                   |
|        |           |            | [phone]         |                   |
|        |           |            | [phone] (Fax)   |                   |
+--------+-----------+------------+----------------------+-------------------+
| / | Surgery   | Radiology  |   Popeye Townsend, | CV EP PPM SYSTEM  |
 
|    |           |            |  MD  [address] | IMPLANT           |
|        |           |            |  [place],   |                   |
|        |           |            | [place] [address]             |                   |
|        |           |            | [phone]         |                   |
|        |           |            | [phone] (Fax)   |                   |
+--------+-----------+------------+----------------------+-------------------+
| 02/10/ | Clinical  | Cardiology |                      |                   |
|    | Support   |            |                      |                   |
+--------+-----------+------------+----------------------+-------------------+
| / | Office    | Cardiology |   RakeshTonia andre,    |                   |
|    | Visit     |            | ARNP  [address]   |                   |
|        |           |            | [place]  |                   |
|        |           |            | [address] [phone]  |                   |
|        |           |            |  [phone] (Fax)  |                   |
+--------+-----------+------------+----------------------+-------------------+
| / | Off-Site  | Nephrology |   Marzena Moser  |                   |
|    | Visit     |            | DO ETIENNE  [address] [place] [address]      |                   |
|        |           |            | [address]      |                   |
|        |           |            | [place]      |                   |
|        |           |            | [address] [phone]  |                   |
|        |           |            |  [phone] (Fax)  |                   |
+--------+-----------+------------+----------------------+-------------------+
 documented as of this encounter
 
 Visit Diagnoses
 Not on filedocumented in this encounter

## 2020-01-08 NOTE — XMS
Encounter Summary
  Created on: 2020
 
 Viviana Ricci
 External Reference #: [id number]
 : 47
 Sex: Female
 
 Demographics
 
 
+-----------------------+----------------------+
| Address               | [address]      |
|                       | [place]  [address] |
+-----------------------+----------------------+
| Home Phone            | [phone]      |
+-----------------------+----------------------+
| Preferred Language    | Unknown              |
+-----------------------+----------------------+
| Marital Status        | Single               |
+-----------------------+----------------------+
| Tenriism Affiliation | [id number]                 |
+-----------------------+----------------------+
| Race                  | Unknown              |
+-----------------------+----------------------+
| Ethnic Group          | Unknown              |
+-----------------------+----------------------+
 
 
 Author
 
 
+--------------+--------------------------------------------+
| Author       | [facility]  |
|              | and [place]                                |
+--------------+--------------------------------------------+
| Organization | [facility]  |
|              | and [place]                                |
+--------------+--------------------------------------------+
| Address      | Unknown                                    |
+--------------+--------------------------------------------+
| Phone        | Unavailable                                |
+--------------+--------------------------------------------+
 
 
 
 Support
 
 
+----------------+--------------+---------------------+-----------------+
| Name           | Relationship | Address             | Phone           |
+----------------+--------------+---------------------+-----------------+
| Ada/Ed Radhames | ECON         | [place]              | [phone] |
|                |              | [place]  |                 |
|                |              |  [address]              |                 |
+----------------+--------------+---------------------+-----------------+
 
 
 
 
 Care Team Providers
 
 
+-----------------------+------+-------------+
| Care Team Member Name | Role | Phone       |
+-----------------------+------+-------------+
 PCP  | Unavailable |
+-----------------------+------+-------------+
 
 
 
 Reason for Visit
 
 
+----------------+------------+
| Reason         | Comments   |
+----------------+------------+
| Urinary Tract  | medication |
| Infection      |            |
+----------------+------------+
 
 
 
 Encounter Details
 
 
+--------+-----------+---------------------+----------------------+----------------+
| Date   | Type      | Department          | Care Team            | Description    |
+--------+-----------+---------------------+----------------------+----------------+
| / | Telephone |   [facility] [place]         |   Rosa Moserias  | Urinary Tract  |
|    |           | NEPHROLOGY  [address]   | M, DO  [address]      | Infection      |
|        |           | [address]   | [address]      | (medication)   |
|        |           | [place], [place]     | [place], [place]      |                |
|        |           | [address]          | [address]  [phone]  |                |
|        |           | [phone]        |  [phone] (Fax)  |                |
+--------+-----------+---------------------+----------------------+----------------+
 
 
 
 Social History
 
 
+--------------+-------+-----------+--------+------+
| Tobacco Use  | Types | Packs/Day | Years  | Date |
|              |       |           | Used   |      |
+--------------+-------+-----------+--------+------+
| Never Smoker |       |           |        |      |
+--------------+-------+-----------+--------+------+
 
 
 
+---------------------+---+---+---+
| Smokeless Tobacco:  |   |   |   |
| Never Used          |   |   |   |
+---------------------+---+---+---+
 
 
 
+---------------------------------------------------------------+
 
| Comments: some second hand smoke exposure, but fairly minimal |
+---------------------------------------------------------------+
 
 
 
+-------------+-------------+---------+----------+
| Alcohol Use | Drinks/Week | oz/Week | Comments |
+-------------+-------------+---------+----------+
| No          |             |         |          |
+-------------+-------------+---------+----------+
 
 
 
+------------------+---------------+
| Sex Assigned at  | Date Recorded |
| Birth            |               |
+------------------+---------------+
| Not on file      |               |
+------------------+---------------+
 
 
 
+----------------+-------------+-------------+
| Job Start Date | Occupation  | Industry    |
+----------------+-------------+-------------+
| Not on file    | Not on file | Not on file |
+----------------+-------------+-------------+
 
 
 
+----------------+--------------+------------+
| Travel History | Travel Start | Travel End |
+----------------+--------------+------------+
 
 
 
+-------------------------------------+
| No recent travel history available. |
+-------------------------------------+
 documented as of this encounter
 
 Plan of Treatment
 
 
+--------+-----------+------------+----------------------+-------------------+
| Date   | Type      | Specialty  | Care Team            | Description       |
+--------+-----------+------------+----------------------+-------------------+
| / | Office    | Cardiology |   Tonia Wilson,    |                   |
|    | Visit     |            | ARNJESSICA  [address]   |                   |
|        |           |            | St  [place], [place]  |                   |
|        |           |            | [address]  [phone]  |                   |
|        |           |            |  [phone] (Fax)  |                   |
+--------+-----------+------------+----------------------+-------------------+
| / | Hospital  | Radiology  |   Popeye Townsend, |                   |
2020   | Encounter |            |  MD  [address] |                   |
|        |           |            |  St.  [place],   |                   |
|        |           |            | [place] [address]             |                   |
|        |           |            | [phone]         |                   |
|        |           |            | [phone] (Fax)   |                   |
+--------+-----------+------------+----------------------+-------------------+
 
| / | Surgery   | Radiology  |   Popeye Townsend, | CV EP PPM SYSTEM  |
|    |           |            |  MD  [address] | IMPLANT           |
|        |           |            |  St.  [place],   |                   |
|        |           |            | [place] [address]             |                   |
|        |           |            | [phone]         |                   |
|        |           |            | [phone] (Fax)   |                   |
+--------+-----------+------------+----------------------+-------------------+
| 02/10/ | Clinical  | Cardiology |                      |                   |
2020   | Support   |            |                      |                   |
+--------+-----------+------------+----------------------+-------------------+
| / | Office    | Cardiology |   Tonia Wilson,    |                   |
|    | Visit     |            | ARNP  [address]   |                   |
|        |           |            | [place]  |                   |
|        |           |            | [address]  [phone]  |                   |
|        |           |            |  [phone] (Fax)  |                   |
+--------+-----------+------------+----------------------+-------------------+
| / | Off-Site  | Nephrology |   Marzena Moser  |                   |
|    | Visit     |            | DO ETIENNE  [facility]      |                   |
|        |           |            | [address]      |                   |
|        |           |            | [place]      |                   |
|        |           |            | [address]  [phone]  |                   |
|        |           |            |  [phone] (Fax)  |                   |
+--------+-----------+------------+----------------------+-------------------+
 documented as of this encounter
 
 Visit Diagnoses
 Not on filedocumented in this encounter

## 2020-01-08 NOTE — XMS
Encounter Summary
  Created on: 2020
 
 Viviana Ricci
 External Reference #: [id number]
 : 47
 Sex: Female
 
 Demographics
 
 
+-----------------------+----------------------+
| Address               | [address]      |
|                       | [place]  [address] |
+-----------------------+----------------------+
| Home Phone            | [phone]      |
+-----------------------+----------------------+
| Preferred Language    | Unknown              |
+-----------------------+----------------------+
| Marital Status        | Single               |
+-----------------------+----------------------+
| Taoist Affiliation | [id number]                 |
+-----------------------+----------------------+
| Race                  | Unknown              |
+-----------------------+----------------------+
| Ethnic Group          | Unknown              |
+-----------------------+----------------------+
 
 
 Author
 
 
+--------------+--------------------------------------------+
| Author       | Located within [facility]  |
|              | and [place]                                |
+--------------+--------------------------------------------+
| Organization | Located within [facility]  |
|              | and [place]                                |
+--------------+--------------------------------------------+
| Address      | Unknown                                    |
+--------------+--------------------------------------------+
| Phone        | Unavailable                                |
+--------------+--------------------------------------------+
 
 
 
 Support
 
 
+----------------+--------------+---------------------+-----------------+
| Name           | Relationship | Address             | Phone           |
+----------------+--------------+---------------------+-----------------+
| Ada/Ed Radhames | ECON         | [address]              | [phone] |
|                |              | [place]  |                 |
|                |              |  [address]              |                 |
+----------------+--------------+---------------------+-----------------+
 
 
 
 
 Care Team Providers
 
 
+-----------------------+------+-------------+
| Care Team Member Name | Role | Phone       |
+-----------------------+------+-------------+
 PCP  | Unavailable |
+-----------------------+------+-------------+
 
 
 
 Encounter Details
 
 
+--------+----------+---------------------+----------------------+-------------+
| Date   | Type     | Department          | Care Team            | Description |
+--------+----------+---------------------+----------------------+-------------+
| / | Abstract |   [facility] [place]         |   Marzena Moser  |             |
| 2017   |          | NEPHROLOGY  [address]   | M, DO  [address]      |             |
|        |          | [address]   | [address]      |             |
|        |          | [place], [place]     | [place]      |             |
|        |          | [address]          | [address]  [phone]  |             |
|        |          | [phone]        |  [phone] (Fax)  |             |
+--------+----------+---------------------+----------------------+-------------+
 
 
 
 Social History
 
 
+--------------+-------+-----------+--------+------+
| Tobacco Use  | Types | Packs/Day | Years  | Date |
|              |       |           | Used   |      |
+--------------+-------+-----------+--------+------+
| Never Smoker |       |           |        |      |
+--------------+-------+-----------+--------+------+
 
 
 
+---------------------+---+---+---+
| Smokeless Tobacco:  |   |   |   |
| Never Used          |   |   |   |
+---------------------+---+---+---+
 
 
 
+---------------------------------------------------------------+
| Comments: some second hand smoke exposure, but fairly minimal |
+---------------------------------------------------------------+
 
 
 
+-------------+-------------+---------+----------+
| Alcohol Use | Drinks/Week | oz/Week | Comments |
+-------------+-------------+---------+----------+
| No          |             |         |          |
+-------------+-------------+---------+----------+
 
 
 
 
+------------------+---------------+
| Sex Assigned at  | Date Recorded |
| Birth            |               |
+------------------+---------------+
| Not on file      |               |
+------------------+---------------+
 
 
 
+----------------+-------------+-------------+
| Job Start Date | Occupation  | Industry    |
+----------------+-------------+-------------+
| Not on file    | Not on file | Not on file |
+----------------+-------------+-------------+
 
 
 
+----------------+--------------+------------+
| Travel History | Travel Start | Travel End |
+----------------+--------------+------------+
 
 
 
+-------------------------------------+
| No recent travel history available. |
+-------------------------------------+
 documented as of this encounter
 
 Plan of Treatment
 
 
+--------+-----------+------------+----------------------+-------------------+
| Date   | Type      | Specialty  | Care Team            | Description       |
+--------+-----------+------------+----------------------+-------------------+
| / | Office    | Cardiology |   Tonia Wilson,    |                   |
|    | Visit     |            | ARNJESSICA  [address]   |                   |
|        |           |            | [place]  |                   |
|        |           |            | [address]  [phone]  |                   |
|        |           |            |  [phone] (Fax)  |                   |
+--------+-----------+------------+----------------------+-------------------+
| / | Hospital  | Radiology  |   Popeye Townsend, |                   |
|    | Encounter |            |  MD  [address] |                   |
|        |           |            |  St.  [place],   |                   |
|        |           |            | [place] [address]             |                   |
|        |           |            | [phone]         |                   |
|        |           |            | [phone] (Fax)   |                   |
+--------+-----------+------------+----------------------+-------------------+
| / | Surgery   | Radiology  |   Popeye Townsend, | CV EP PPM SYSTEM  |
|    |           |            |  MD  [address] | IMPLANT           |
|        |           |            |  St.  [place],   |                   |
|        |           |            | [place] [address]             |                   |
|        |           |            | [phone]         |                   |
|        |           |            | [phone] (Fax)   |                   |
+--------+-----------+------------+----------------------+-------------------+
| 02/10/ | Clinical  | Cardiology |                      |                   |
|    | Support   |            |                      |                   |
+--------+-----------+------------+----------------------+-------------------+
| / | Office    | Cardiology |   Tonia Wilson,    |                   |
|    | Visit     |            | ARNP  [address]   |                   |
 
|        |           |            | St  [place], [place]  |                   |
|        |           |            | [address]  [phone]  |                   |
|        |           |            |  [phone] (Fax)  |                   |
+--------+-----------+------------+----------------------+-------------------+
| / | Off-Site  | Nephrology |   Marzena Moser  |                   |
|    | Visit     |            | M,   [address] [place] [address]      |                   |
|        |           |            | [address]      |                   |
|        |           |            | [place]      |                   |
|        |           |            | [address]  [phone]  |                   |
|        |           |            |  [phone] (Fax)  |                   |
+--------+-----------+------------+----------------------+-------------------+
 documented as of this encounter
 
 Procedures
 
 
+----------------------+--------+------------+----------------------+----------------------+
| Procedure Name       | Priori | Date/Time  | Associated Diagnosis | Comments             |
|                      | ty     |            |                      |                      |
+----------------------+--------+------------+----------------------+----------------------+
| URINALYSIS W/CULTURE | Routin | 2017 |                      |   Results for this   |
|  IF INDICATED        | e      |            |                      | procedure are in the |
|                      |        |            |                      |  results section.    |
+----------------------+--------+------------+----------------------+----------------------+
 documented in this encounter
 
 Results
 Urinalysis w/Culture if Indicated (2017)
 
+-----------------+
| Specimen        |
+-----------------+
| Urine specimen  |
| (specimen)      |
+-----------------+
 
 
 
+----------------------------------------------------------------------+--------------+
| Narrative                                                            | Performed At |
+----------------------------------------------------------------------+--------------+
|   2/3/17- Over 100,00 CFU/mL lactose .     17- Lactose  |              |
|  identified as Escherichia coli                             |              |
+----------------------------------------------------------------------+--------------+
 
 
 
+------------------+------------------+--------+----------------+
| Organism         | Antibiotic       | Method | Susceptibility |
+------------------+------------------+--------+----------------+
| Escherichia coli | Ampicillin       |        |   Sensitive    |
+------------------+------------------+--------+----------------+
| Escherichia coli | Amoxicillin +    |        |   Sensitive    |
|                  | Clavulanate      |        |                |
+------------------+------------------+--------+----------------+
| Escherichia coli | Piperacillin +   |        |   Sensitive    |
|                  | Tazobactam       |        |                |
+------------------+------------------+--------+----------------+
| Escherichia coli | Cefazolin        |        |   Sensitive    |
+------------------+------------------+--------+----------------+
 
| Escherichia coli | Cefepime         |        |   Sensitive    |
+------------------+------------------+--------+----------------+
| Escherichia coli | Aztreonam        |        |   Sensitive    |
+------------------+------------------+--------+----------------+
| Escherichia coli | Ertapenem        |        |   Sensitive    |
+------------------+------------------+--------+----------------+
| Escherichia coli | Imipenem         |        |   Sensitive    |
+------------------+------------------+--------+----------------+
| Escherichia coli | Meropenem        |        |   Sensitive    |
+------------------+------------------+--------+----------------+
| Escherichia coli | Gentamicin       |        |   Sensitive    |
+------------------+------------------+--------+----------------+
| Escherichia coli | Ciprofloxacin    |        |   Sensitive    |
+------------------+------------------+--------+----------------+
| Escherichia coli | Levofloxacin     |        |   Sensitive    |
+------------------+------------------+--------+----------------+
| Escherichia coli | Tetracycline     |        |   Sensitive    |
+------------------+------------------+--------+----------------+
| Escherichia coli | Nitrofurantoin   |        |   Sensitive    |
+------------------+------------------+--------+----------------+
| Escherichia coli | Trimethoprim +   |        |   Sensitive    |
|                  | Sulfamethoxazole |        |                |
+------------------+------------------+--------+----------------+
 documented in this encounter
 
 Visit Diagnoses
 Not on filedocumented in this encounter

## 2020-01-08 NOTE — XMS
Encounter Summary
  Created on: 2020
 
 Viviana Ricci
 External Reference #: [id number]
 : 47
 Sex: Female
 
 Demographics
 
 
+-----------------------+----------------------+
| Address               | [address]      |
|                       | [place]  [address] |
+-----------------------+----------------------+
| Home Phone            | [phone]      |
+-----------------------+----------------------+
| Preferred Language    | Unknown              |
+-----------------------+----------------------+
| Marital Status        | Single               |
+-----------------------+----------------------+
| Bahai Affiliation | [id number]                 |
+-----------------------+----------------------+
| Race                  | Unknown              |
+-----------------------+----------------------+
| Ethnic Group          | Unknown              |
+-----------------------+----------------------+
 
 
 Author
 
 
+--------------+--------------------------------------------+
| Author       | [facility]  |
|              | and [facility]                                |
+--------------+--------------------------------------------+
| Organization | [facility]  |
|              | and [facility]                                |
+--------------+--------------------------------------------+
| Address      | Unknown                                    |
+--------------+--------------------------------------------+
| Phone        | Unavailable                                |
+--------------+--------------------------------------------+
 
 
 
 Support
 
 
+----------------+--------------+---------------------+-----------------+
| Name           | Relationship | Address             | Phone           |
+----------------+--------------+---------------------+-----------------+
| Ada/Ed Radhames | ECON         | [address]              | [phone] |
|                |              | [place]  |                 |
|                |              |  [address]              |                 |
+----------------+--------------+---------------------+-----------------+
 
 
 
 
 Care Team Providers
 
 
+------------------------+------+-----------------+
| Care Team Member Name  | Role | Phone           |
+------------------------+------+-----------------+
| Marzena Moser DO | PCP  | [phone] |
+------------------------+------+-----------------+
 
 
 
 Reason for Visit
 
 
+-------------------+----------+
| Reason            | Comments |
+-------------------+----------+
| Medication Refill |          |
+-------------------+----------+
 
 
 
 Encounter Details
 
 
+--------+--------+---------------------+----------------------+-------------------+
| Date   | Type   | Department          | Care Team            | Description       |
+--------+--------+---------------------+----------------------+-------------------+
| / | Refill |   [facility]         |   Marzena Moser  | Medication Refill |
| 2018   |        | NEPHROLOGY  [address]   | M, DO  [address]      |                   |
|        |        | [address]   | [address]      |                   |
|        |        | [place], [place]     | [place], [place]      |                   |
|        |        | [address]          | [address]  [phone]  |                   |
|        |        | [phone]        |  [phone] (Fax)  |                   |
+--------+--------+---------------------+----------------------+-------------------+
 
 
 
 Social History
 
 
+--------------+-------+-----------+--------+------+
| Tobacco Use  | Types | Packs/Day | Years  | Date |
|              |       |           | Used   |      |
+--------------+-------+-----------+--------+------+
| Never Smoker |       |           |        |      |
+--------------+-------+-----------+--------+------+
 
 
 
+---------------------+---+---+---+
| Smokeless Tobacco:  |   |   |   |
| Never Used          |   |   |   |
+---------------------+---+---+---+
 
 
 
+---------------------------------------------------------------+
| Comments: some second hand smoke exposure, but fairly minimal |
 
+---------------------------------------------------------------+
 
 
 
+-------------+-------------+---------+----------+
| Alcohol Use | Drinks/Week | oz/Week | Comments |
+-------------+-------------+---------+----------+
| No          |             |         |          |
+-------------+-------------+---------+----------+
 
 
 
+------------------+---------------+
| Sex Assigned at  | Date Recorded |
| Birth            |               |
+------------------+---------------+
| Not on file      |               |
+------------------+---------------+
 
 
 
+----------------+-------------+-------------+
| Job Start Date | Occupation  | Industry    |
+----------------+-------------+-------------+
| Not on file    | Not on file | Not on file |
+----------------+-------------+-------------+
 
 
 
+----------------+--------------+------------+
| Travel History | Travel Start | Travel End |
+----------------+--------------+------------+
 
 
 
+-------------------------------------+
| No recent travel history available. |
+-------------------------------------+
 documented as of this encounter
 
 Plan of Treatment
 
 
+--------+-----------+------------+----------------------+-------------------+
| Date   | Type      | Specialty  | Care Team            | Description       |
+--------+-----------+------------+----------------------+-------------------+
| / | Office    | Cardiology |   Tonia Wilson,    |                   |
|    | Visit     |            | ARNP  [address]   |                   |
|        |           |            | [place], [place]  |                   |
|        |           |            | [address]  [phone]  |                   |
|        |           |            |  [phone] (Fax)  |                   |
+--------+-----------+------------+----------------------+-------------------+
| / | Hospital  | Radiology  |   Popeye Townsend, |                   |
|    | Encounter |            |  MD  [address] |                   |
|        |           |            |  [place],   |                   |
|        |           |            | [place] [address]             |                   |
|        |           |            | [phone]         |                   |
|        |           |            | [phone] (Fax)   |                   |
+--------+-----------+------------+----------------------+-------------------+
| / | Surgery   | Radiology  |   Popeye Townsend, | CV EP PPM SYSTEM  |
 
|    |           |            |  MD  [address] | IMPLANT           |
|        |           |            |  [place],   |                   |
|        |           |            | [place] [address]             |                   |
|        |           |            | [phone]         |                   |
|        |           |            | [phone] (Fax)   |                   |
+--------+-----------+------------+----------------------+-------------------+
| 02/10/ | Clinical  | Cardiology |                      |                   |
|    | Support   |            |                      |                   |
+--------+-----------+------------+----------------------+-------------------+
| / | Office    | Cardiology |   Tonia Wilson,    |                   |
|    | Visit     |            | ARNP  [address]   |                   |
|        |           |            | [place]  |                   |
|        |           |            | [id number] [phone]  |                   |
|        |           |            |  [phone] (Fax)  |                   |
+--------+-----------+------------+----------------------+-------------------+
| / | Off-Site  | Nephrology |   Marzena Moser  |                   |
|    | Visit     |            | DO [facility]  [address] [place] [address]      |                   |
|        |           |            | [address]      |                   |
|        |           |            | [place]      |                   |
|        |           |            | [address] [phone]  |                   |
|        |           |            |  [phone] (Fax)  |                   |
+--------+-----------+------------+----------------------+-------------------+
 documented as of this encounter
 
 Visit Diagnoses
 
 
+-----------------------------------------------------------------------------------------+
| Diagnosis                                                                               |
+-----------------------------------------------------------------------------------------+
|   Edema, unspecified type                                                               |
+-----------------------------------------------------------------------------------------+
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
| membranous glomerulonephritis                                                           |
+-----------------------------------------------------------------------------------------+
|   Hyperlipidemia  Other and unspecified hyperlipidemia                                  |
+-----------------------------------------------------------------------------------------+
 documented in this encounter

## 2020-01-08 NOTE — XMS
Encounter Summary
  Created on: 2020
 
 Viviana Ricci
 External Reference #: [id number]
 : 47
 Sex: Female
 
 Demographics
 
 
+-----------------------+----------------------+
| Address               | [address]      |
|                       | [place]  [address] |
+-----------------------+----------------------+
| Home Phone            | [phone]      |
+-----------------------+----------------------+
| Preferred Language    | Unknown              |
+-----------------------+----------------------+
| Marital Status        | Single               |
+-----------------------+----------------------+
| Yarsani Affiliation | [id number]                 |
+-----------------------+----------------------+
| Race                  | Unknown              |
+-----------------------+----------------------+
| Ethnic Group          | Unknown              |
+-----------------------+----------------------+
 
 
 Author
 
 
+--------------+--------------------------------------------+
| Author       | [facility]  |
|              | and [facility]                                |
+--------------+--------------------------------------------+
| Organization | [facility] [place]  |
|              | and [facility]                                |
+--------------+--------------------------------------------+
| Address      | Unknown                                    |
+--------------+--------------------------------------------+
| Phone        | Unavailable                                |
+--------------+--------------------------------------------+
 
 
 
 Support
 
 
+----------------+--------------+---------------------+-----------------+
| Name           | Relationship | Address             | Phone           |
+----------------+--------------+---------------------+-----------------+
| Ada/Ed Radhames | ECON         | [address]              | [phone] |
|                |              | [place]  |                 |
|                |              |  [id number]              |                 |
+----------------+--------------+---------------------+-----------------+
 
 
 
 
 Care Team Providers
 
 
+------------------------+------+-----------------+
| Care Team Member Name  | Role | Phone           |
+------------------------+------+-----------------+
| Marzena Moser DO | PCP  | [phone] |
+------------------------+------+-----------------+
 
 
 
 Encounter Details
 
 
+--------+-------------+---------------------+----------------------+----------------------+
| Date   | Type        | Department          | Care Team            | Description          |
+--------+-------------+---------------------+----------------------+----------------------+
| / | Orders Only |   [facility]         |   Marzena Moser  | Kidney replaced by   |
| 2019   |             | NEPHROLOGY  [address]   | M, DO  [address]      | transplant (Primary  |
|        |             | [address]   | [place]      | Dx); Mixed           |
|        |             | [place], [place]     | [place], [place]      | hyperlipidemia; Type |
|        |             | [address]          | [address]  [phone]  |  2 diabetes mellitus |
|        |             | [phone]        |  [phone] (Fax)  |  with chronic kidney |
|        |             |                     |                      |  disease, without    |
|        |             |                     |                      | long-term current    |
|        |             |                     |                      | use of insulin,      |
|        |             |                     |                      | unspecified CKD      |
|        |             |                     |                      | stage (HCC); Vitamin |
|        |             |                     |                      |  D deficiency        |
+--------+-------------+---------------------+----------------------+----------------------+
 
 
 
 Social History
 
 
+--------------+-------+-----------+--------+------+
| Tobacco Use  | Types | Packs/Day | Years  | Date |
|              |       |           | Used   |      |
+--------------+-------+-----------+--------+------+
| Never Smoker |       |           |        |      |
+--------------+-------+-----------+--------+------+
 
 
 
+---------------------+---+---+---+
| Smokeless Tobacco:  |   |   |   |
| Never Used          |   |   |   |
+---------------------+---+---+---+
 
 
 
+---------------------------------------------------------------+
| Comments: some second hand smoke exposure, but fairly minimal |
+---------------------------------------------------------------+
 
 
 
+-------------+-------------+---------+----------+
 
| Alcohol Use | Drinks/Week | oz/Week | Comments |
+-------------+-------------+---------+----------+
| No          |             |         |          |
+-------------+-------------+---------+----------+
 
 
 
+------------------+---------------+
| Sex Assigned at  | Date Recorded |
| Birth            |               |
+------------------+---------------+
| Not on file      |               |
+------------------+---------------+
 
 
 
+----------------+-------------+-------------+
| Job Start Date | Occupation  | Industry    |
+----------------+-------------+-------------+
| Not on file    | Not on file | Not on file |
+----------------+-------------+-------------+
 
 
 
+----------------+--------------+------------+
| Travel History | Travel Start | Travel End |
+----------------+--------------+------------+
 
 
 
+-------------------------------------+
| No recent travel history available. |
+-------------------------------------+
 documented as of this encounter
 
 Plan of Treatment
 
 
+--------+-----------+------------+----------------------+-------------------+
| Date   | Type      | Specialty  | Care Team            | Description       |
+--------+-----------+------------+----------------------+-------------------+
| / | Office    | Cardiology |   Tonia Wilson,    |                   |
|    | Visit     |            | ARNJESSICA  [address]   |                   |
|        |           |            | [place], [place]  |                   |
|        |           |            | [address]  [phone]  |                   |
|        |           |            |  [phone] (Fax)  |                   |
+--------+-----------+------------+----------------------+-------------------+
| / | Hospital  | Radiology  |   Popeye Townsend, |                   |
|    | Encounter |            |  MD  [address] |                   |
|        |           |            |  [place],   |                   |
|        |           |            | [place] [address]             |                   |
|        |           |            | [phone]         |                   |
|        |           |            | [phone] (Fax)   |                   |
+--------+-----------+------------+----------------------+-------------------+
| / | Surgery   | Radiology  |   Popeye Townsend, | CV EP PPM SYSTEM  |
|    |           |            |  MD  [address] | IMPLANT           |
|        |           |            |  St.  [place],   |                   |
|        |           |            | [place] [address]             |                   |
|        |           |            | [phone]         |                   |
|        |           |            | [phone] (Fax)   |                   |
 
+--------+-----------+------------+----------------------+-------------------+
| 02/10/ | Clinical  | Cardiology |                      |                   |
|    | Support   |            |                      |                   |
+--------+-----------+------------+----------------------+-------------------+
| / | Office    | Cardiology |   RakeshmaxxalfredoTonia,    |                   |
|    | Visit     |            | [facility]  [address]   |                   |
|        |           |            | [place]  |                   |
|        |           |            | [address]  [phone]  |                   |
|        |           |            |  [phone] (Fax)  |                   |
+--------+-----------+------------+----------------------+-------------------+
| / | Off-Site  | Nephrology |   Marzena Moser  |                   |
|    | Visit     |            | DO ETIENNE  [address] [place] [address]      |                   |
|        |           |            | [address]      |                   |
|        |           |            | [place]      |                   |
|        |           |            | [address]  [phone]  |                   |
|        |           |            |  [phone] (Fax)  |                   |
+--------+-----------+------------+----------------------+-------------------+
 
 
 
+--------------------+------+--------+----------------------+----------------------+
| Name               | Type | Priori | Associated Diagnoses | Order Schedule       |
|                    |      | ty     |                      |                      |
+--------------------+------+--------+----------------------+----------------------+
| CBC with           | Lab  | Routin |   Kidney replaced by | Every 3 months for 4 |
| Differential       |      | e      |  transplant  Mixed   |  Occurrences         |
|                    |      |        | hyperlipidemia  Type | starting 2019  |
|                    |      |        |  2 diabetes mellitus | until 2020     |
|                    |      |        |  with chronic kidney |                      |
|                    |      |        |  disease, without    |                      |
|                    |      |        | long-term current    |                      |
|                    |      |        | use of insulin,      |                      |
|                    |      |        | unspecified CKD      |                      |
|                    |      |        | stage (HCC)          |                      |
+--------------------+------+--------+----------------------+----------------------+
| Hemoglobin A1C     | Lab  | Routin |   Kidney replaced by | EVery 3 months for 4 |
|                    |      | e      |  transplant  Mixed   |  Occurrences         |
|                    |      |        | hyperlipidemia  Type | starting 2019  |
|                    |      |        |  2 diabetes mellitus | until 2020     |
|                    |      |        |  with chronic kidney |                      |
|                    |      |        |  disease, without    |                      |
|                    |      |        | long-term current    |                      |
|                    |      |        | use of insulin,      |                      |
|                    |      |        | unspecified CKD      |                      |
|                    |      |        | stage (HCC)          |                      |
+--------------------+------+--------+----------------------+----------------------+
| Lipid Profile      | Lab  | Routin |   Kidney replaced by | Every 3 months for 4 |
|                    |      | e      |  transplant  Mixed   |  Occurrences         |
|                    |      |        | hyperlipidemia  Type | starting 2019  |
|                    |      |        |  2 diabetes mellitus | until 2020     |
|                    |      |        |  with chronic kidney |                      |
|                    |      |        |  disease, without    |                      |
|                    |      |        | long-term current    |                      |
|                    |      |        | use of insulin,      |                      |
|                    |      |        | unspecified CKD      |                      |
|                    |      |        | stage (HCC)          |                      |
+--------------------+------+--------+----------------------+----------------------+
| Vitamin D,         | Lab  | Routin |   Kidney replaced by | Every 3 months for 4 |
| Deficiency Screen  |      | e      |  transplant  Mixed   |  Occurrences         |
| (25-Hydroxy)       |      |        | hyperlipidemia  Type | starting 2019  |
 
|                    |      |        |  2 diabetes mellitus | until 2020     |
|                    |      |        |  with chronic kidney |                      |
|                    |      |        |  disease, without    |                      |
|                    |      |        | long-term current    |                      |
|                    |      |        | use of insulin,      |                      |
|                    |      |        | unspecified CKD      |                      |
|                    |      |        | stage (HCC)  Vitamin |                      |
|                    |      |        |  D deficiency        |                      |
+--------------------+------+--------+----------------------+----------------------+
| Cytomegalovirus,   | Lab  | Routin |   Kidney replaced by | Every 3 months for 4 |
| NAAT, Quant        |      | e      |  transplant  Mixed   |  Occurrences         |
|                    |      |        | hyperlipidemia  Type | starting 2019  |
|                    |      |        |  2 diabetes mellitus | until 2020     |
|                    |      |        |  with chronic kidney |                      |
|                    |      |        |  disease, without    |                      |
|                    |      |        | long-term current    |                      |
|                    |      |        | use of insulin,      |                      |
|                    |      |        | unspecified CKD      |                      |
|                    |      |        | stage (HCC)          |                      |
+--------------------+------+--------+----------------------+----------------------+
 documented as of this encounter
 
 Visit Diagnoses
 
 
+------------------------------------------------------------------------------------------+
| Diagnosis                                                                                |
+------------------------------------------------------------------------------------------+
|   Kidney replaced by transplant - Primary                                                |
+------------------------------------------------------------------------------------------+
|   Mixed hyperlipidemia                                                                   |
+------------------------------------------------------------------------------------------+
|   Type 2 diabetes mellitus with chronic kidney disease, without long-term current use of |
|  insulin, unspecified CKD stage (HCC)                                                    |
+------------------------------------------------------------------------------------------+
|   Vitamin D deficiency  Unspecified vitamin D deficiency                                 |
+------------------------------------------------------------------------------------------+
 documented in this encounter

## 2020-01-08 NOTE — XMS
Encounter Summary
  Created on: 2020
 
 Viviana Ricci
 External Reference #: [id number]
 : 47
 Sex: Female
 
 Demographics
 
 
+-----------------------+----------------------+
| Address               | [address]      |
|                       | [place]  [address] |
+-----------------------+----------------------+
| Home Phone            | [phone]      |
+-----------------------+----------------------+
| Preferred Language    | Unknown              |
+-----------------------+----------------------+
| Marital Status        | Single               |
+-----------------------+----------------------+
| Gnosticist Affiliation | [id number]                 |
+-----------------------+----------------------+
| Race                  | Unknown              |
+-----------------------+----------------------+
| Ethnic Group          | Unknown              |
+-----------------------+----------------------+
 
 
 Author
 
 
+--------------+--------------------------------------------+
| Author       | [facility]  |
|              | and [facility]                                |
+--------------+--------------------------------------------+
| Organization | [facility]  |
|              | and [facility]                                |
+--------------+--------------------------------------------+
| Address      | Unknown                                    |
+--------------+--------------------------------------------+
| Phone        | Unavailable                                |
+--------------+--------------------------------------------+
 
 
 
 Support
 
 
+----------------+--------------+---------------------+-----------------+
| Name           | Relationship | Address             | Phone           |
+----------------+--------------+---------------------+-----------------+
| Ada/Ed Radhames | ECON         | [address]              | [phone] |
|                |              | [place], [place]  |                 |
|                |              |  [address]              |                 |
+----------------+--------------+---------------------+-----------------+
 
 
 
 
 Care Team Providers
 
 
+-----------------------+------+-------------+
| Care Team Member Name | Role | Phone       |
+-----------------------+------+-------------+
 PCP  | Unavailable |
+-----------------------+------+-------------+
 
 
 
 Reason for Referral
 Diagnostic/Screening (Routine)
 
+--------+--------+-----------+--------------+--------------+---------------+
| Status | Reason | Specialty | Diagnoses /  | Referred By  | Referred To   |
|        |        |           | Procedures   | Contact      | Contact       |
+--------+--------+-----------+--------------+--------------+---------------+
| Closed |        | Radiology |   Diagnoses  |              |   [facility]    |
|        |        |           |  Pulmonary   | [place],  | [address]  |
|        |        |           | hypertension | Nena GUSMAN,   |  [place], |
|        |        |           |  ([facility])       | MD  [address]    |  [place]           |
|        |        |           | Procedures   | [address]    | [address]    |
|        |        |           | ECHO         | [place], | Phone:        |
|        |        |           | Complete     |  [place] [address]    | [phone]  |
|        |        |           |              |              |  Fax:         |
|        |        |           |              |              | [phone]  |
+--------+--------+-----------+--------------+--------------+---------------+
 
 
 
 
 Reason for Visit
 
 
+----------------+----------+
| Reason         | Comments |
+----------------+----------+
| Establish Care |          |
+----------------+----------+
 
 
 
 Encounter Details
 
 
+--------+---------+----------------------+----------------+----------------------+
| Date   | Type    | Department           | Care Team      | Description          |
+--------+---------+----------------------+----------------+----------------------+
| / | Office  |   [facility]          |   [place],  | Cough (Primary Dx);  |
|    | Visit   | PULMONARY  [address]     | Nena GUSMAN MD  | Dyspnea; JACK on      |
|        |         | [address]  [place], |                | CPAP; Obesity        |
|        |         |  [place] [address]       |                | hypoventilation      |
|        |         | [phone]         |                | syndrome (HCC);      |
|        |         |                      |                | Pulmonary            |
|        |         |                      |                | hypertension (HCC)   |
+--------+---------+----------------------+----------------+----------------------+
 
 
 
 
 Social History
 
 
+--------------+-------+-----------+--------+------+
| Tobacco Use  | Types | Packs/Day | Years  | Date |
|              |       |           | Used   |      |
+--------------+-------+-----------+--------+------+
| Never Smoker |       |           |        |      |
+--------------+-------+-----------+--------+------+
 
 
 
+---------------------+---+---+---+
| Smokeless Tobacco:  |   |   |   |
| Never Used          |   |   |   |
+---------------------+---+---+---+
 
 
 
+---------------------------------------------------------------+
| Comments: some second hand smoke exposure, but fairly minimal |
+---------------------------------------------------------------+
 
 
 
+-------------+-------------+---------+----------+
| Alcohol Use | Drinks/Week | oz/Week | Comments |
+-------------+-------------+---------+----------+
| No          |             |         |          |
+-------------+-------------+---------+----------+
 
 
 
+------------------+---------------+
| Sex Assigned at  | Date Recorded |
| Birth            |               |
+------------------+---------------+
| Not on file      |               |
+------------------+---------------+
 
 
 
+----------------+-------------+-------------+
| Job Start Date | Occupation  | Industry    |
+----------------+-------------+-------------+
| Not on file    | Not on file | Not on file |
+----------------+-------------+-------------+
 
 
 
+----------------+--------------+------------+
| Travel History | Travel Start | Travel End |
+----------------+--------------+------------+
 
 
 
+-------------------------------------+
| No recent travel history available. |
+-------------------------------------+
 
 documented as of this encounter
 
 Last Filed Vital Signs
 
 
+-------------------+-------------------+----------------------+----------+
| Vital Sign        | Reading           | Time Taken           | Comments |
+-------------------+-------------------+----------------------+----------+
| Blood Pressure    | 100/66            | 2013  2:03 PM  |          |
|                   |                   | PDT                  |          |
+-------------------+-------------------+----------------------+----------+
| Pulse             | 70                | 2013  2:03 PM  |          |
|                   |                   | PDT                  |          |
+-------------------+-------------------+----------------------+----------+
| Temperature       | -                 | -                    |          |
+-------------------+-------------------+----------------------+----------+
| Respiratory Rate  | -                 | -                    |          |
+-------------------+-------------------+----------------------+----------+
| Oxygen Saturation | 95%               | 2013  2:03 PM  |          |
|                   |                   | PDT                  |          |
+-------------------+-------------------+----------------------+----------+
| Inhaled Oxygen    | -                 | -                    |          |
| Concentration     |                   |                      |          |
+-------------------+-------------------+----------------------+----------+
| Weight            | 135.6 kg (299 lb) | 2013  2:03 PM  |          |
|                   |                   | PDT                  |          |
+-------------------+-------------------+----------------------+----------+
| Height            | 167.6 cm (5' 6")  | 2013  2:03 PM  |          |
|                   |                   | PDT                  |          |
+-------------------+-------------------+----------------------+----------+
| Body Mass Index   | 48.26             | 2013  2:03 PM  |          |
|                   |                   | PDT                  |          |
+-------------------+-------------------+----------------------+----------+
 documented in this encounter
 
 Patient Instructions
 Patient Instructions Nena Boykin MD - 2013  3:43 PM PDTTake CPAP machine o
r chip to [facility] to get a download for me.
  Have echocardiogram done here and overnight oxygen test done through [facility]. Elec
tronically signed by Nena Boykin MD at 2013  3:45 PM PDT
 documented in this encounter
 
 Progress Notes
 Nena Boykin MD - 2013  2:45 PM PDTFormatting of this note might be differe
nt from the original.
 Pulmonary Consult Note
 
 Referring Provider: Referral, Self 
 
 HPI 
  
 Viviana Ricci is a 67 y.o. female patient of Marzena Moser here today for evaluation of
 pulmonary hypertension, shortness of breath, with question of asthma.
 
 She notes that two years ago she developed a cough about three years ago she developed a co
ugh. She saw Dr. Pham and was diagnosed with pulmonary hypertension. She has undergone an ex
tensive work up for that. It was felt that the main etiology was sleep apnea, but there was 
question about her heart catheterization results and if Dr. Cabello was going to do any treatme
nt. 
 
 
 She notes that in August she again developed a cough. She initially went to urgent care in 
[place]. She was first diagnosed with bronchitis and she was treated with antibiotics, and
 they seemed to help. The second time they gave her three different antibiotics without reli
ef. The third time, they recommended she get further evaluation. 
 
 She then had some issues with wheezing. She notes that she would wake up at night wheezing.
 She finally went in to urgent care here in February at [facility], and at that visit she saw Dr. Mireles and he thought she might have asthma. She was prescribed ProAir and an antibiotic a
nd she did not feel that these helped. Over time though, her symptoms did get somewhat fox
r in that the wheezing resolved, and the cough has abated to an intermittent hack. 
 
 She notes however, that her shortness of breath has progressed to the point where she has d
ifficulty walking across the room. She has associated weakness and fatigue, and at times fee
ls like she will have difficulty with standing like she will fall in to the floor.  
 
 She does note that when the cough started again she went back off her CPAP machine, and she
 wonders if this is why she had gotten more tired. She is now back on her CPAP, having resta
rted it 3 weeks ago. She last saw Yaakov in 2011 and at that time had been averagi
ng 4:30 a night usage with an AHI of 1.3 on an auto titrating CPAP machine. She gets her mac
rosalee through [facility]. She notes that she does not otherwise have issues with the mac
rosalee, and the mask is not leaking. She does not know that her fatigue is getting better now 
that she is back on her CPAP, though she might feel better in the morning. She does not have
 an oxygen bleed in on her CPAP. 
 
 Past Medical History
 Past Medical History 
 Diagnosis Date 
   Kidney replaced by transplant  
   Complication of transplanted kidney  
   Coronary artery disease  
   s/p CABG , cardiac cath in  
   Pulmonary hypertension  
   thought secondary to nocturnal hypoxemia 
   Obesity hypoventilation syndrome  
   JACK on CPAP  
   Diabetes mellitus, type 2  
   Secondary hyperparathyroidism  
   Hypothyroidism  
   Hyperlipidemia  
   Chronic low back pain  
   Movement disorder  
   tremor of unclear etiology 
   DJD (degenerative joint disease)  
   s/p knee replacement 
   Humerus fracture  
   right supracondylar distal humerus fracture 
   Carpal tunnel syndrome  
   Anemia in chronic renal disease  
   resolved after transplant 
   Infection with CMV (cytomegalovirus)  
   complicating kidney transplant 
   FSGS (focal segmental glomerulosclerosis)  
   ESRD (end stage renal disease)  
  
  
 Past Surgical History
 Past Surgical History 
 Procedure Date 
   Cholecystectomy  
   Insert peritoneal catheter 1998 
 
   x 2 
   Kidney transplant 2000 
   Total knee arthroplasty 2004 
   Right 
   Hysterectomy 2003 
   Abdominal exploration surgery 2006 
   Parathyroidectomy 2007 
   Carpal tunnel release 2005 
   Coronary artery bypass graft 1998 
   5v 
  
 
 Family History: 
 Family History 
 Problem Relation Age of Onset 
   Parkinsonism Father  
   Heart disease Father  
   Ovarian cancer Mother  
   Other (See Comment) Brother  
   paralyzed in accident and then  from complications 
  
 
 Social History: 
 History 
 
 Social History 
   Marital Status: Single 
   Spouse Name: N/A 
   Number of Children: N/A 
   Years of Education: N/A 
 
 Occupational History 
   .  Disabled 
 
 Social History Main Topics 
   Smoking status: Never Smoker  
   Smokeless tobacco: Never Used 
   Alcohol Use: No 
   Drug Use: No 
   Sexually Active: None 
 
 Other Topics Concern 
   None 
 
 Social History Narrative 
  Lives in [place] alone. Has a dog at home. No other animal exposures. Had birds as a chi
ld. Grew up in [place], [place]. 
 
 Allergies:
 No Known Allergies  
 
 Medications:
 Outpatient Encounter Prescriptions as of 2013 
 Medication Sig Dispense Refill 
   valsartan (DIOVAN) 160 mg tablet Take 1 tablet by mouth Daily.  30 tablet  11 
   albuterol (PROAIR HFA) 90 mcg/puff inhaler Inhale 2 puffs into the lungs every 6 hours 
as needed for Wheezing.  1 Inhaler  2 
   lisinopril (PRINIVIL,ZESTRIL) 30 MG tablet Take 1 tablet by mouth Daily.  90 tablet  3 
   predniSONE (DELTASONE) 5 mg tablet Take 1 tablet by mouth Daily.  90 tablet  3 
   glucose blood VI test strips (ONE TOUCH ULTRA TEST) strip Check blood sugar before each
 
 meal and as directed  100 each  12 
   furosemide (LASIX) 40 mg tablet Take two tablets by mouth daily.  60 tablet  5 
   rosuvastatin (CRESTOR) 40 MG tablet Take 40 mg by mouth nightly.       
   tacrolimus (PROGRAF) 1 mg capsule Take 1 mg by mouth 2 times daily.     
   tacrolimus (PROGRAF) 0.5 mg capsule Take 0.5 mg by mouth 2 times daily.     
   insulin glargine (LANTUS) 100 units/mL injection Inject 20 Units under the skin every m
orning.       
   cinacalcet (SENSIPAR) 30 mg tablet Take 1 tablet by mouth Daily.  30 tablet  12 
   fludrocortisone (FLORINEF) 0.1 mg tablet Take 1 tablet by mouth Every other day.  30 ta
blet  11 
   levothyroxine (LEVOTHROID) 50 mcg tablet Take 1 tablet by mouth Daily.  30 tablet  11 
   metoprolol tartrate (LOPRESSOR) 25 mg tablet Take 25 mg by mouth 2 times daily.     
   Prenatal Multivit-Min-Fe-FA (PRENATAL VITAMINS) 0.8 MG TABS Take 0.8 mg by mouth Daily.
     
   omeprazole (PRILOSEC) 20 mg capsule Take one capsule by mouth once daily on an empty st
omach     
   allopurinol (ZYLOPRIM) 100 mg tablet Take 100 mg by mouth Daily.     
   aspirin 81 MG EC tablet Take 81 mg by mouth Daily.     
   loperamide (ANTI-DIARRHEAL) 2 mg capsule Take 2 mg by mouth Daily as needed.     
   mycophenolate (CELLCEPT) 250 mg capsule Take 250 mg by mouth 3 times daily.     
   Insulin Syringe-Needle U-100 (BD INSULIN SYRINGE ULTRAFINE) 31G X 5/16" 0.5 ML MISC Use
 to inject insulin subcutaneously before meals or as directed     
   insulin lispro (HUMALOG) 100 units/mL injection Inject subcutaneously before meals acco
rding to sliding scale     
   Cholecalciferol (VITAMIN D3) 5000 UNITS CAPS Take 5,000 Units by mouth Once a week.    
 
   magnesium oxide (MAG-OX) 400 mg tablet Take 400 mg by mouth 2 times daily.     
   niacin (NIASPAN) 500 mg CR tablet Not taking     
 
 Review of Systems
 Constitutional:  Denies fever, chills, sweats. Has gained and lost weight. Up 16 pounds in 
the last year.  
 Sleep:  No trouble sleeping on her CPAP. Typically has felt rested using her CPAP.  
 Eyes:  Blurred vision at times, blood sugar dependent.  
 ENT:  Notes that she clears her throat a lot, but does not notice drainage or nasal congest
ion. Had a little during the time period of her bronchitis.  
 Resp:   See HPI.  
 CV:  Denies chest pain, palpitations, syncope. Chronic leg swelling, no change.  
 GI:  Denies  heartburn, nausea, vomiting, and abdominal pain. 
 : Denies difficulty emptying bladder and nocturia. 
 Musculoskeletal: Chronic low back pain, no other joint issues.  
 Derm: Denies rash, itching, dryness, and suspicious lesions. 
 Neurologic: Foot numbness.  
 Psych: Denies depression, anxiety, and suicidal ideation. 
 Endo: Denies cold intolerance, heat intolerance, and unusual weight change. 
 Heme: Denies bleeding, and enlarged lymph nodes. Easy bruising.  
 Allergy: Denies food allergies, allergic rash, and  hay fever. 
 
 Objective 
 
 /66 | Pulse 70 | Ht 1.676 m (5' 6") | Wt 135.626 kg (299 lb) | BMI 48.26 kg/m2 | SpO2
 95%
 
 General Appearance:  Alert, cooperative, no distress, appears stated age, obese, comfortabl
e, has a very difficult time moving, periodically shakes her head 
 Head:  Normocephalic, without obvious abnormality, atraumatic 
 Eyes:  PERRL, conjunctiva clear, no scleral icterus, EOM's intact 
 Ears:  Normal TM's, external auditory canals, normal acuity 
 Nose: Nares normal, septum midline, mucosa normal 
 Mouth: No oral lesions or exudate 
 
 Neck: Supple, symmetrical, no adenopathy 
 Lungs:   No accessory muscle use, breath sounds are somewhat diminsihed bilaterally, no whe
ezes, crackles or rhonchi 
 Chest Wall:  No deformity 
 Heart:  Regular rate and rhythm, no murmur, rub or gallop though somewhat difficult to ausc
ultate 
 Abdomen:   Soft, non-tender, non-distended, obese 
 Extremities:  No cyanosis, clubbing, 1+ edema 
 Pulses: Radial pulses 1+ and symmetric 
 Skin: Warm and dry 
 Lymph nodes: Cervical and supraclavicular nodes normal 
 
 Data:
 Chest x-ray done 2013 was reviewed and interpreted in clinic today. It shows s
table cardiomegaly.
 
 Chest CT scan done in 2010 was reviewed and interpreted in clinic today. It show
s some focal scarring on the right.
 
 Pulmonary function tests were performed prior to clinic today. They were consistent with no
rmal spirometry, normal lung volumes and a moderately reduced diffusion capacity (not correc
jeanie for a measured hemoglobin, though she notes that this has been fairly normal recently). 
Of note, these look much better than her previous PFTs. 
 
 Marzena Moser's notes were reviewed in clinic today.
 
 Dr. Pham's old notes were reviewed in clinic today.
 
 Sleep study was done back in 2010 and reviewed in clinic today showing an AHI of 6
7.9 and significant desaturations down to as low as 59%. 
 
 Right and left heart cath was done by Dr. Cabello in May of 2010 and reviewed in clinic today.
 It showed basically patent grafts, and a mildly dilated LV with an EF of 70%. Wedge pressur
e was elevated at 18mmHg, PA pressure was 58/22 with a mean of 36mmHg. 
 
 Echocardiogram report and images were not received and was not done here. 
 
 Assessment / Plan 
 Ms. Ricci was seen today for evaluation of dyspnea and cough.
 
 Diagnoses and associated orders for this visit:
 
 Cough and Dyspnea
 On review of all of her records from a variety of sources (and I admit, they are extensive 
and from several facilities at this point encompassing several providers), there is no clear
 etiology found. Her cough was previously attributed to her pulmonary hypertension, but this
 usually does not in my experience cause cough. ACE-I induced cough is a real possibility, a
s it can come on any time when taking the medication. Heart failure or volume over load is a
lso a real possibility as she has had an elevated wedge pressure, kidney failure and more re
cent issues with azotemia post transplant. I suggested we do a few things, starting with the
 diagnoses we know we have: we get her old echocardiogram, [facility] at [facility]? We check a CPAP netta
nload, we check an overnight oximetry, we get another echocardiogram to see where we are at.
 We treat any issues that become readily apparent. We consider checking a BNP. If we don't f
ind anything, then we consider changing her ACE-I to another medication. We can also conside
r a methacholine for asthma if we still have not found an answer, but thi sis lower on my di
fferential. 
 
 Jack on cpap
 She is on her CPAP without issue. She has not seen Yaakov in quite some time. She has lost we
ight since starting CPAP. I would like to check a download to make sure this is going okay. 
 
Poorly treated JACK could worsen heart failure. 
 - Pulse oximetry, overnight study; CPAP
 
 Obesity hypoventilation syndrome
 As above. She has significant desaturations in addition to JACK. That being said, she has lo
st weight. She never had follow up oximetry testing when she started CPAP that she recalls a
nd this should be done to ensure she is not desaturating. 
 - Pulse oximetry, overnight study; CPAP
 
 Pulmonary hypertension
 I am not sure where this stands. Her old echo was not provided though I have right heart ca
th data. We will try to track down her old echo and then also get a new echo. 
 - ECHO Complete; Future
 
 She was advised to call if new pulmonary symptoms were to develop.
 
 Return to clinic after echo and overnight oximetry are done to reassess, or sooner with ellen casiano.
 
 CC: Marzena Moser,DO
 
 Portions of this report were transcribed using voice recognition software.  Every effort wa
s made to ensure accuracy; however, inadvertent computerized transcription errors may be pre
sent.
 
 Electronically signed by Nena Boykin MD at 2013 11:39 PM PDTdocumented in t
his encounter
 
 Plan of Treatment
 
 
+--------+-----------+------------+----------------------+-------------------+
| Date   | Type      | Specialty  | Care Team            | Description       |
+--------+-----------+------------+----------------------+-------------------+
| / | Office    | Cardiology |   Tonia Wilson,    |                   |
|    | Visit     |            | ARNP  [address]   |                   |
|        |           |            | [place], [place]  |                   |
|        |           |            | [address]  [phone]  |                   |
|        |           |            |  [phone] (Fax)  |                   |
+--------+-----------+------------+----------------------+-------------------+
| / | Hospital  | Radiology  |   Popeye Townsend, |                   |
|    | Encounter |            |  MD  [address] |                   |
|        |           |            |  [facility]   |                   |
|        |           |            | [place] [address]             |                   |
|        |           |            | [phone]         |                   |
|        |           |            | [phone] (Fax)   |                   |
+--------+-----------+------------+----------------------+-------------------+
| / | Surgery   | Radiology  |   Irineoroycechidi Yinirineo, | CV EP PPM SYSTEM  |
|    |           |            |  MD  [address] | IMPLANT           |
|        |           |            |  [facility] [place]   |                   |
|        |           |            | [place] [address]             |                   |
|        |           |            | [phone]         |                   |
|        |           |            | [phone] (Fax)   |                   |
+--------+-----------+------------+----------------------+-------------------+
| 02/10/ | Clinical  | Cardiology |                      |                   |
2020   | Support   |            |                      |                   |
+--------+-----------+------------+----------------------+-------------------+
| / | Office    | Cardiology |   Tonia Wilson,    |                   |
|    | Visit     |            | ARNJESSICA  [address]   |                   |
|        |           |            | [place]  |                   |
 
|        |           |            | [address]  [phone]  |                   |
|        |           |            |  [phone] (Fax)  |                   |
+--------+-----------+------------+----------------------+-------------------+
| / | Off-Site  | Nephrology |   Marzena Moser  |                   |
| 2020   | Visit     |            | DO ETIENNE  [address] [place] [address]      |                   |
|        |           |            | [address]      |                   |
|        |           |            | [place]      |                   |
|        |           |            | [address]  [phone]  |                   |
|        |           |            |  [phone] (Fax)  |                   |
+--------+-----------+------------+----------------------+-------------------+
 
 
 
+------------------+-------------+--------+----------------------+----------------------+
| Name             | Type        | Priori | Associated Diagnoses | Order Schedule       |
|                  |             | ty     |                      |                      |
+------------------+-------------+--------+----------------------+----------------------+
| ECHO Complete    | Echocardiog | Routin |   Pulmonary          | Expected:            |
|                  | trevon       | e      | hypertension (HCC)   | 2013, Expires: |
|                  |             |        |                      |  2014          |
+------------------+-------------+--------+----------------------+----------------------+
| Pulse oximetry,  | Respiratory | Routin |   JACK on CPAP        | Expected:            |
| overnight study  |  Care       | e      | Obesity              | 2013, Expires: |
|                  |             |        | hypoventilation      |  2014          |
|                  |             |        | syndrome (HCC)       |                      |
+------------------+-------------+--------+----------------------+----------------------+
 documented as of this encounter
 
 Visit Diagnoses
 
 
+----------------------------------------------------------------------------+
| Diagnosis                                                                  |
+----------------------------------------------------------------------------+
|   Cough - Primary                                                          |
+----------------------------------------------------------------------------+
|   Dyspnea  Other dyspnea and respiratory abnormality                       |
+----------------------------------------------------------------------------+
|   JACK on CPAP  Obstructive sleep apnea (adult) (pediatric)                 |
+----------------------------------------------------------------------------+
|   Obesity hypoventilation syndrome (HCC)  Obesity hypoventilation syndrome |
+----------------------------------------------------------------------------+
|   Pulmonary hypertension (HCC)  Other chronic pulmonary heart diseases     |
+----------------------------------------------------------------------------+
 documented in this encounter

## 2020-01-08 NOTE — XMS
Encounter Summary
  Created on: 2020
 
 Viviana Ricci
 External Reference #: [id number]
 : 47
 Sex: Female
 
 Demographics
 
 
+-----------------------+----------------------+
| Address               | [address]      |
|                       | [place]  [address] |
+-----------------------+----------------------+
| Home Phone            | [phone]      |
+-----------------------+----------------------+
| Preferred Language    | Unknown              |
+-----------------------+----------------------+
| Marital Status        | Single               |
+-----------------------+----------------------+
| Jewish Affiliation | [id number]                 |
+-----------------------+----------------------+
| Race                  | Unknown              |
+-----------------------+----------------------+
| Ethnic Group          | Unknown              |
+-----------------------+----------------------+
 
 
 Author
 
 
+--------------+--------------------------------------------+
| Author       | [facility]  |
|              | and [place]                                |
+--------------+--------------------------------------------+
| Organization | [facility]  |
|              | and [place]                                |
+--------------+--------------------------------------------+
| Address      | Unknown                                    |
+--------------+--------------------------------------------+
| Phone        | Unavailable                                |
+--------------+--------------------------------------------+
 
 
 
 Support
 
 
+----------------+--------------+---------------------+-----------------+
| Name           | Relationship | Address             | Phone           |
+----------------+--------------+---------------------+-----------------+
| Ada/Ed Radhames | ECON         | [place]              | [phone] |
|                |              | [place]  |                 |
|                |              |  [address]              |                 |
+----------------+--------------+---------------------+-----------------+
 
 
 
 
 Care Team Providers
 
 
+-----------------------+------+-------------+
| Care Team Member Name | Role | Phone       |
+-----------------------+------+-------------+
 PCP  | Unavailable |
+-----------------------+------+-------------+
 
 
 
 Reason for Visit
 
 
+-------------------+----------+
| Reason            | Comments |
+-------------------+----------+
| Medication Refill |          |
+-------------------+----------+
 
 
 
 Encounter Details
 
 
+--------+--------+---------------------+----------------------+-------------------+
| Date   | Type   | Department          | Care Team            | Description       |
+--------+--------+---------------------+----------------------+-------------------+
| 02/10/ | Refill |   [facility]         |   Marzena Moser  | Medication Refill |
|    |        | NEPHROLOGY  [address]   | M, DO  [address]      |                   |
|        |        | [address]   | [address]      |                   |
|        |        | [place], [place]     | [place], [place]      |                   |
|        |        | [address]          | [address]  [phone]  |                   |
|        |        | [phone]        |  [phone] (Fax)  |                   |
+--------+--------+---------------------+----------------------+-------------------+
 
 
 
 Social History
 
 
+--------------+-------+-----------+--------+------+
| Tobacco Use  | Types | Packs/Day | Years  | Date |
|              |       |           | Used   |      |
+--------------+-------+-----------+--------+------+
| Never Smoker |       |           |        |      |
+--------------+-------+-----------+--------+------+
 
 
 
+---------------------+---+---+---+
| Smokeless Tobacco:  |   |   |   |
| Never Used          |   |   |   |
+---------------------+---+---+---+
 
 
 
+---------------------------------------------------------------+
| Comments: some second hand smoke exposure, but fairly minimal |
 
+---------------------------------------------------------------+
 
 
 
+-------------+-------------+---------+----------+
| Alcohol Use | Drinks/Week | oz/Week | Comments |
+-------------+-------------+---------+----------+
| No          |             |         |          |
+-------------+-------------+---------+----------+
 
 
 
+------------------+---------------+
| Sex Assigned at  | Date Recorded |
| Birth            |               |
+------------------+---------------+
| Not on file      |               |
+------------------+---------------+
 
 
 
+----------------+-------------+-------------+
| Job Start Date | Occupation  | Industry    |
+----------------+-------------+-------------+
| Not on file    | Not on file | Not on file |
+----------------+-------------+-------------+
 
 
 
+----------------+--------------+------------+
| Travel History | Travel Start | Travel End |
+----------------+--------------+------------+
 
 
 
+-------------------------------------+
| No recent travel history available. |
+-------------------------------------+
 documented as of this encounter
 
 Plan of Treatment
 
 
+--------+-----------+------------+----------------------+-------------------+
| Date   | Type      | Specialty  | Care Team            | Description       |
+--------+-----------+------------+----------------------+-------------------+
| / | Office    | Cardiology |   HellalfredoTonia,    |                   |
|    | Visit     |            | ARNP  [address]   |                   |
|        |           |            | St  [place], [place]  |                   |
|        |           |            | [address]  [phone]  |                   |
|        |           |            |  [phone] (Fax)  |                   |
+--------+-----------+------------+----------------------+-------------------+
| / | Hospital  | Radiology  |   Popeye Townsend, |                   |
|    | Encounter |            |  MD  [address] |                   |
|        |           |            |  St.  [place],   |                   |
|        |           |            | [place] [address]             |                   |
|        |           |            | [phone]         |                   |
|        |           |            | [phone] (Fax)   |                   |
+--------+-----------+------------+----------------------+-------------------+
| / | Surgery   | Radiology  |   Popeye Townsend, | CV EP PPM SYSTEM  |
 
|    |           |            |  MD  [address] | IMPLANT           |
|        |           |            |  St.  [place],   |                   |
|        |           |            | [place] [address]             |                   |
|        |           |            | [phone]         |                   |
|        |           |            | [phone] (Fax)   |                   |
+--------+-----------+------------+----------------------+-------------------+
| 02/10/ | Clinical  | Cardiology |                      |                   |
|    | Support   |            |                      |                   |
+--------+-----------+------------+----------------------+-------------------+
| / | Office    | Cardiology |   Tonia Wilson,    |                   |
|    | Visit     |            | ARNP  [address]   |                   |
|        |           |            | [place]  |                   |
|        |           |            | [address]  [phone]  |                   |
|        |           |            |  [phone] (Fax)  |                   |
+--------+-----------+------------+----------------------+-------------------+
| / | Off-Site  | Nephrology |   Marzena Moser  |                   |
|    | Visit     |            | DO ETIENNE  [address] [place] [address]      |                   |
|        |           |            | [address]      |                   |
|        |           |            | [place]      |                   |
|        |           |            | [address]  [phone]  |                   |
|        |           |            |  [phone] (Fax)  |                   |
+--------+-----------+------------+----------------------+-------------------+
 documented as of this encounter
 
 Visit Diagnoses
 Not on filedocumented in this encounter

## 2020-01-08 NOTE — XMS
Encounter Summary
  Created on: 2020
 
 Viviana Ricci
 External Reference #: [id number]
 : 47
 Sex: Female
 
 Demographics
 
 
+-----------------------+----------------------+
| Address               | [address]      |
|                       | [place]  [address] |
+-----------------------+----------------------+
| Home Phone            | [phone]      |
+-----------------------+----------------------+
| Preferred Language    | Unknown              |
+-----------------------+----------------------+
| Marital Status        | Single               |
+-----------------------+----------------------+
| Hoahaoism Affiliation | [id number]                 |
+-----------------------+----------------------+
| Race                  | Unknown              |
+-----------------------+----------------------+
| Ethnic Group          | Unknown              |
+-----------------------+----------------------+
 
 
 Author
 
 
+--------------+--------------------------------------------+
| Author       | [facility]  |
|              | and [place]                                |
+--------------+--------------------------------------------+
| Organization | [facility]  |
|              | and [place]                                |
+--------------+--------------------------------------------+
| Address      | Unknown                                    |
+--------------+--------------------------------------------+
| Phone        | Unavailable                                |
+--------------+--------------------------------------------+
 
 
 
 Support
 
 
+----------------+--------------+---------------------+-----------------+
| Name           | Relationship | Address             | Phone           |
+----------------+--------------+---------------------+-----------------+
| Ada/Ed Radhames | ECON         | [place]              | [phone] |
|                |              | [place]  |                 |
|                |              |  [address]              |                 |
+----------------+--------------+---------------------+-----------------+
 
 
 
 
 Care Team Providers
 
 
+-----------------------+------+-------------+
| Care Team Member Name | Role | Phone       |
+-----------------------+------+-------------+
 PCP  | Unavailable |
+-----------------------+------+-------------+
 
 
 
 Reason for Visit
 
 
+-------------------+----------+
| Reason            | Comments |
+-------------------+----------+
| Medication Refill |          |
+-------------------+----------+
 
 
 
 Encounter Details
 
 
+--------+--------+---------------------+----------------------+-------------------+
| Date   | Type   | Department          | Care Team            | Description       |
+--------+--------+---------------------+----------------------+-------------------+
| / | Refill |   [facility]         |   Marzena Moser  | Medication Refill |
| 2017   |        | NEPHROLOGY  [address]   | M, DO  [address]      |                   |
|        |        | [address]   | [address]      |                   |
|        |        | [place], [place]     | [place], [place]      |                   |
|        |        | [address]          | [address]  [phone]  |                   |
|        |        | [phone]        |  [phone] (Fax)  |                   |
+--------+--------+---------------------+----------------------+-------------------+
 
 
 
 Social History
 
 
+--------------+-------+-----------+--------+------+
| Tobacco Use  | Types | Packs/Day | Years  | Date |
|              |       |           | Used   |      |
+--------------+-------+-----------+--------+------+
| Never Smoker |       |           |        |      |
+--------------+-------+-----------+--------+------+
 
 
 
+---------------------+---+---+---+
| Smokeless Tobacco:  |   |   |   |
| Never Used          |   |   |   |
+---------------------+---+---+---+
 
 
 
+---------------------------------------------------------------+
| Comments: some second hand smoke exposure, but fairly minimal |
 
+---------------------------------------------------------------+
 
 
 
+-------------+-------------+---------+----------+
| Alcohol Use | Drinks/Week | oz/Week | Comments |
+-------------+-------------+---------+----------+
| No          |             |         |          |
+-------------+-------------+---------+----------+
 
 
 
+------------------+---------------+
| Sex Assigned at  | Date Recorded |
| Birth            |               |
+------------------+---------------+
| Not on file      |               |
+------------------+---------------+
 
 
 
+----------------+-------------+-------------+
| Job Start Date | Occupation  | Industry    |
+----------------+-------------+-------------+
| Not on file    | Not on file | Not on file |
+----------------+-------------+-------------+
 
 
 
+----------------+--------------+------------+
| Travel History | Travel Start | Travel End |
+----------------+--------------+------------+
 
 
 
+-------------------------------------+
| No recent travel history available. |
+-------------------------------------+
 documented as of this encounter
 
 Plan of Treatment
 
 
+--------+-----------+------------+----------------------+-------------------+
| Date   | Type      | Specialty  | Care Team            | Description       |
+--------+-----------+------------+----------------------+-------------------+
| / | Office    | Cardiology |   HellalfredoTonia,    |                   |
|    | Visit     |            | ARNP  [address]   |                   |
|        |           |            | St  [place], [place]  |                   |
|        |           |            | [address]  [phone]  |                   |
|        |           |            |  [phone] (Fax)  |                   |
+--------+-----------+------------+----------------------+-------------------+
| / | Hospital  | Radiology  |   Popeye Townsend, |                   |
|    | Encounter |            |  MD  [address] |                   |
|        |           |            |  St.  [place],   |                   |
|        |           |            | [place] [address]             |                   |
|        |           |            | [phone]         |                   |
|        |           |            | [phone] (Fax)   |                   |
+--------+-----------+------------+----------------------+-------------------+
| / | Surgery   | Radiology  |   Popeye Townsend, | CV EP PPM SYSTEM  |
 
|    |           |            |  MD  [address] | IMPLANT           |
|        |           |            |  St.  [place],   |                   |
|        |           |            | [place] [address]             |                   |
|        |           |            | [phone]         |                   |
|        |           |            | [phone] (Fax)   |                   |
+--------+-----------+------------+----------------------+-------------------+
| 02/10/ | Clinical  | Cardiology |                      |                   |
|    | Support   |            |                      |                   |
+--------+-----------+------------+----------------------+-------------------+
| / | Office    | Cardiology |   Tonia Wilson,    |                   |
|    | Visit     |            | ARNP  [address]   |                   |
|        |           |            | [place]  |                   |
|        |           |            | [address]  [phone]  |                   |
|        |           |            |  [phone] (Fax)  |                   |
+--------+-----------+------------+----------------------+-------------------+
| / | Off-Site  | Nephrology |   Marzena Moser  |                   |
|    | Visit     |            | DO ETIENNE  [address] [place] [address]      |                   |
|        |           |            | [address]      |                   |
|        |           |            | [place]      |                   |
|        |           |            | [address]  [phone]  |                   |
|        |           |            |  [phone] (Fax)  |                   |
+--------+-----------+------------+----------------------+-------------------+
 documented as of this encounter
 
 Visit Diagnoses
 Not on filedocumented in this encounter

## 2020-01-08 NOTE — XMS
Encounter Summary
  Created on: 2020
 
 Viviana Ricci
 External Reference #: [id number]
 : 47
 Sex: Female
 
 Demographics
 
 
+-----------------------+----------------------+
| Address               | [address]      |
|                       | [place]  [address] |
+-----------------------+----------------------+
| Home Phone            | [phone]      |
+-----------------------+----------------------+
| Preferred Language    | Unknown              |
+-----------------------+----------------------+
| Marital Status        | Single               |
+-----------------------+----------------------+
| Gnosticist Affiliation | [id number]                 |
+-----------------------+----------------------+
| Race                  | Unknown              |
+-----------------------+----------------------+
| Ethnic Group          | Unknown              |
+-----------------------+----------------------+
 
 
 Author
 
 
+--------------+--------------------------------------------+
| Author       | [facility]  |
|              | and [facility]                                |
+--------------+--------------------------------------------+
| Organization | [facility]  |
|              | and [facility]                                |
+--------------+--------------------------------------------+
| Address      | Unknown                                    |
+--------------+--------------------------------------------+
| Phone        | Unavailable                                |
+--------------+--------------------------------------------+
 
 
 
 Support
 
 
+----------------+--------------+---------------------+-----------------+
| Name           | Relationship | Address             | Phone           |
+----------------+--------------+---------------------+-----------------+
| Ada/Ed Radhames | ECON         | [address]              | [phone] |
|                |              | [place]  |                 |
|                |              |  [address]              |                 |
+----------------+--------------+---------------------+-----------------+
 
 
 
 
 Care Team Providers
 
 
+-----------------------+------+-------------+
| Care Team Member Name | Role | Phone       |
+-----------------------+------+-------------+
 PCP  | Unavailable |
+-----------------------+------+-------------+
 
 
 
 Reason for Visit
 
 
+-------------------+----------+
| Reason            | Comments |
+-------------------+----------+
| Generalized Body  |          |
| Aches             |          |
+-------------------+----------+
 
 
 
 Encounter Details
 
 
+--------+-----------+---------------------+----------------------+-------------------+
| Date   | Type      | Department          | Care Team            | Description       |
+--------+-----------+---------------------+----------------------+-------------------+
| / | Telephone |   [facility]         |   Marzena Moser  | Generalized Body  |
|    |           | NEPHROLOGY  [address]   | M, DO  [address]      | Aches             |
|        |           | [address]   | [address]      |                   |
|        |           | [place], [place]     | [place], [place]      |                   |
|        |           | [address]          | [address]  [phone]  |                   |
|        |           | [phone]        |  [phone] (Fax)  |                   |
+--------+-----------+---------------------+----------------------+-------------------+
 
 
 
 Social History
 
 
+--------------+-------+-----------+--------+------+
| Tobacco Use  | Types | Packs/Day | Years  | Date |
|              |       |           | Used   |      |
+--------------+-------+-----------+--------+------+
| Never Smoker |       |           |        |      |
+--------------+-------+-----------+--------+------+
 
 
 
+---------------------+---+---+---+
| Smokeless Tobacco:  |   |   |   |
| Never Used          |   |   |   |
+---------------------+---+---+---+
 
 
 
+---------------------------------------------------------------+
 
| Comments: some second hand smoke exposure, but fairly minimal |
+---------------------------------------------------------------+
 
 
 
+-------------+-------------+---------+----------+
| Alcohol Use | Drinks/Week | oz/Week | Comments |
+-------------+-------------+---------+----------+
| No          |             |         |          |
+-------------+-------------+---------+----------+
 
 
 
+------------------+---------------+
| Sex Assigned at  | Date Recorded |
| Birth            |               |
+------------------+---------------+
| Not on file      |               |
+------------------+---------------+
 
 
 
+----------------+-------------+-------------+
| Job Start Date | Occupation  | Industry    |
+----------------+-------------+-------------+
| Not on file    | Not on file | Not on file |
+----------------+-------------+-------------+
 
 
 
+----------------+--------------+------------+
| Travel History | Travel Start | Travel End |
+----------------+--------------+------------+
 
 
 
+-------------------------------------+
| No recent travel history available. |
+-------------------------------------+
 documented as of this encounter
 
 Plan of Treatment
 
 
+--------+-----------+------------+----------------------+-------------------+
| Date   | Type      | Specialty  | Care Team            | Description       |
+--------+-----------+------------+----------------------+-------------------+
| / | Office    | Cardiology |   Arlen Wilsona,    |                   |
|    | Visit     |            | [address]   |                   |
|        |           |            | [place], [place]  |                   |
|        |           |            | [address]  [phone]  |                   |
|        |           |            |  [phone] (Fax)  |                   |
+--------+-----------+------------+----------------------+-------------------+
| / | Hospital  | Radiology  |   Popeye Townsend, |                   |
|    | Encounter |            |  MD  [address] |                   |
|        |           |            |  [place],   |                   |
|        |           |            | [place] [address]             |                   |
|        |           |            | [phone]         |                   |
|        |           |            | [phone] (Fax)   |                   |
+--------+-----------+------------+----------------------+-------------------+
 
| / | Surgery   | Radiology  |   Popeye Townsend, | CV EP PPM SYSTEM  |
|    |           |            |  MD  [address] | IMPLANT           |
|        |           |            |  St.  [place],   |                   |
|        |           |            | [place] [address]             |                   |
|        |           |            | [phone]         |                   |
|        |           |            | [phone] (Fax)   |                   |
+--------+-----------+------------+----------------------+-------------------+
| 02/10/ | Clinical  | Cardiology |                      |                   |
|    | Support   |            |                      |                   |
+--------+-----------+------------+----------------------+-------------------+
| / | Office    | Cardiology |   Tonia Wilson,    |                   |
|    | Visit     |            | BELKIS  [address]   |                   |
|        |           |            | [place]  |                   |
|        |           |            | [id number] [phone]  |                   |
|        |           |            |  [phone] (Fax)  |                   |
+--------+-----------+------------+----------------------+-------------------+
| / | Off-Site  | Nephrology |   Marzena Moser  |                   |
|    | Visit     |            | DO ETIENNE  [address] [place] [address]      |                   |
|        |           |            | [address]      |                   |
|        |           |            | [place]      |                   |
|        |           |            | [address] [phone]  |                   |
|        |           |            |  [phone] (Fax)  |                   |
+--------+-----------+------------+----------------------+-------------------+
 documented as of this encounter
 
 Visit Diagnoses
 Not on filedocumented in this encounter

## 2020-01-08 NOTE — XMS
Encounter Summary
  Created on: 2020
 
 Viviana Ricci
 External Reference #: [id number]
 : 47
 Sex: Female
 
 Demographics
 
 
+-----------------------+----------------------+
| Address               | [address]      |
|                       | [place]  [address] |
+-----------------------+----------------------+
| Home Phone            | [phone]      |
+-----------------------+----------------------+
| Preferred Language    | Unknown              |
+-----------------------+----------------------+
| Marital Status        | Single               |
+-----------------------+----------------------+
| Shinto Affiliation | 1009                 |
+-----------------------+----------------------+
| Race                  | Unknown              |
+-----------------------+----------------------+
| Ethnic Group          | Unknown              |
+-----------------------+----------------------+
 
 
 Author
 
 
+--------------+--------------------------------------------+
| Author       | [facility]  |
|              | and [facility]                                |
+--------------+--------------------------------------------+
| Organization | [facility]  |
|              | and [facility]                                |
+--------------+--------------------------------------------+
| Address      | Unknown                                    |
+--------------+--------------------------------------------+
| Phone        | Unavailable                                |
+--------------+--------------------------------------------+
 
 
 
 Support
 
 
+----------------+--------------+---------------------+-----------------+
| Name           | Relationship | Address             | Phone           |
+----------------+--------------+---------------------+-----------------+
| Ada/Ed Radhames | ECON         | [address]              | [phone] |
|                |              | [place]  |                 |
|                |              |  [address]              |                 |
+----------------+--------------+---------------------+-----------------+
 
 
 
 
 Care Team Providers
 
 
+------------------------+------+-----------------+
| Care Team Member Name  | Role | Phone           |
+------------------------+------+-----------------+
| Marzena Moser DO | PCP  | [phone] |
+------------------------+------+-----------------+
 
 
 
 Reason for Visit
 
 
+-------------------+----------+
| Reason            | Comments |
+-------------------+----------+
| Medication Refill |          |
+-------------------+----------+
 
 
 
 Encounter Details
 
 
+--------+--------+---------------------+----------------------+-------------------+
| Date   | Type   | Department          | Care Team            | Description       |
+--------+--------+---------------------+----------------------+-------------------+
| / | Refill |   [facility]         |   Marzena Moser  | Medication Refill |
| 2019   |        | NEPHROLOGY  [address]   | M, DO  [address]      |                   |
|        |        | [address]   | [address]      |                   |
|        |        | [place], [place]     | [place], [place]      |                   |
|        |        | [address]          | [address]  [phone]  |                   |
|        |        | [phone]        |  [phone] (Fax)  |                   |
+--------+--------+---------------------+----------------------+-------------------+
 
 
 
 Social History
 
 
+--------------+-------+-----------+--------+------+
| Tobacco Use  | Types | Packs/Day | Years  | Date |
|              |       |           | Used   |      |
+--------------+-------+-----------+--------+------+
| Never Smoker |       |           |        |      |
+--------------+-------+-----------+--------+------+
 
 
 
+---------------------+---+---+---+
| Smokeless Tobacco:  |   |   |   |
| Never Used          |   |   |   |
+---------------------+---+---+---+
 
 
 
+---------------------------------------------------------------+
| Comments: some second hand smoke exposure, but fairly minimal |
 
+---------------------------------------------------------------+
 
 
 
+-------------+-------------+---------+----------+
| Alcohol Use | Drinks/Week | oz/Week | Comments |
+-------------+-------------+---------+----------+
| No          |             |         |          |
+-------------+-------------+---------+----------+
 
 
 
+------------------+---------------+
| Sex Assigned at  | Date Recorded |
| Birth            |               |
+------------------+---------------+
| Not on file      |               |
+------------------+---------------+
 
 
 
+----------------+-------------+-------------+
| Job Start Date | Occupation  | Industry    |
+----------------+-------------+-------------+
| Not on file    | Not on file | Not on file |
+----------------+-------------+-------------+
 
 
 
+----------------+--------------+------------+
| Travel History | Travel Start | Travel End |
+----------------+--------------+------------+
 
 
 
+-------------------------------------+
| No recent travel history available. |
+-------------------------------------+
 documented as of this encounter
 
 Plan of Treatment
 
 
+--------+-----------+------------+----------------------+-------------------+
| Date   | Type      | Specialty  | Care Team            | Description       |
+--------+-----------+------------+----------------------+-------------------+
| / | Office    | Cardiology |   Tonia Wilson,    |                   |
|    | Visit     |            | ARNP  [address]   |                   |
|        |           |            | St [place], [place]  |                   |
|        |           |            | [address]  [phone]  |                   |
|        |           |            |  [phone] (Fax)  |                   |
+--------+-----------+------------+----------------------+-------------------+
| / | Hospital  | Radiology  |   Popeye Townsend, |                   |
|    | Encounter |            |  MD  [address] |                   |
|        |           |            |  [place],   |                   |
|        |           |            | [place] [address]             |                   |
|        |           |            | [phone]         |                   |
|        |           |            | [phone] (Fax)   |                   |
+--------+-----------+------------+----------------------+-------------------+
| / | Surgery   | Radiology  |   Popeye Townsend, | CV EP PPM SYSTEM  |
 
|    |           |            |  MD  [address] | IMPLANT           |
|        |           |            |  [place],   |                   |
|        |           |            | [place] [address]             |                   |
|        |           |            | [phone]         |                   |
|        |           |            | [phone] (Fax)   |                   |
+--------+-----------+------------+----------------------+-------------------+
| 02/10/ | Clinical  | Cardiology |                      |                   |
|    | Support   |            |                      |                   |
+--------+-----------+------------+----------------------+-------------------+
| / | Office    | Cardiology |   Tonia Wilson,    |                   |
|    | Visit     |            | ARNP  [address]   |                   |
|        |           |            | [place]  |                   |
|        |           |            | [id number] [phone]  |                   |
|        |           |            |  [phone] (Fax)  |                   |
+--------+-----------+------------+----------------------+-------------------+
| / | Off-Site  | Nephrology |   Marzena Moser  |                   |
|    | Visit     |            | DO ETIENNE  [address] [place] [address]      |                   |
|        |           |            | [address]      |                   |
|        |           |            | [place]      |                   |
|        |           |            | [id number] [phone]  |                   |
|        |           |            |  [phone] (Fax)  |                   |
+--------+-----------+------------+----------------------+-------------------+
 documented as of this encounter
 
 Visit Diagnoses
 
 
+---------------------------------+
| Diagnosis                       |
+---------------------------------+
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
+---------------------------------+
 documented in this encounter

## 2020-01-08 NOTE — XMS
Encounter Summary
  Created on: 2020
 
 Viviana Ricci
 External Reference #: [id number]
 : 47
 Sex: Female
 
 Demographics
 
 
+-----------------------+----------------------+
| Address               | [address]      |
|                       | [place]  [address] |
+-----------------------+----------------------+
| Home Phone            | [phone]      |
+-----------------------+----------------------+
| Preferred Language    | Unknown              |
+-----------------------+----------------------+
| Marital Status        | Single               |
+-----------------------+----------------------+
| Uatsdin Affiliation | [id number]                 |
+-----------------------+----------------------+
| Race                  | Unknown              |
+-----------------------+----------------------+
| Ethnic Group          | Unknown              |
+-----------------------+----------------------+
 
 
 Author
 
 
+--------------+--------------------------------------------+
| Author       | [facility]  |
|              | and [place]                                |
+--------------+--------------------------------------------+
| Organization | [facility]  |
|              | and [place]                                |
+--------------+--------------------------------------------+
| Address      | Unknown                                    |
+--------------+--------------------------------------------+
| Phone        | Unavailable                                |
+--------------+--------------------------------------------+
 
 
 
 Support
 
 
+----------------+--------------+---------------------+-----------------+
| Name           | Relationship | Address             | Phone           |
+----------------+--------------+---------------------+-----------------+
| Ada/Ed Radhames | ECON         | [place]              | [phone] |
|                |              | [place], [place]  |                 |
|                |              |  [address]              |                 |
+----------------+--------------+---------------------+-----------------+
 
 
 
 
 Care Team Providers
 
 
+-----------------------+------+-------------+
| Care Team Member Name | Role | Phone       |
+-----------------------+------+-------------+
 PCP  | Unavailable |
+-----------------------+------+-------------+
 
 
 
 Encounter Details
 
 
+--------+-----------+----------------------+-----------+-------------+
| Date   | Type      | Department           | Care Team | Description |
+--------+-----------+----------------------+-----------+-------------+
| / | Hospital  |   [facility] |           |             |
|    | Encounter |  MED CTR XRAY  [address]           |             |
|        |           [address]  [place]       |           |             |
|        |           | [place] [address] |           |             |
|        |           |   [phone]       |           |             |
+--------+-----------+----------------------+-----------+-------------+
 
 
 
 Social History
 
 
+----------------+-------+-----------+--------+------+
| Tobacco Use    | Types | Packs/Day | Years  | Date |
|                |       |           | Used   |      |
+----------------+-------+-----------+--------+------+
| Never Assessed |       |           |        |      |
+----------------+-------+-----------+--------+------+
 
 
 
+------------------+---------------+
| Sex Assigned at  | Date Recorded |
| Birth            |               |
+------------------+---------------+
| Not on file      |               |
+------------------+---------------+
 
 
 
+----------------+-------------+-------------+
| Job Start Date | Occupation  | Industry    |
+----------------+-------------+-------------+
| Not on file    | Not on file | Not on file |
+----------------+-------------+-------------+
 
 
 
+----------------+--------------+------------+
| Travel History | Travel Start | Travel End |
+----------------+--------------+------------+
 
 
 
 
+-------------------------------------+
| No recent travel history available. |
+-------------------------------------+
 documented as of this encounter
 
 Plan of Treatment
 
 
+--------+-----------+------------+----------------------+-------------------+
| Date   | Type      | Specialty  | Care Team            | Description       |
+--------+-----------+------------+----------------------+-------------------+
| / | Office    | Cardiology |   Tonia Wilson,    |                   |
|    | Visit     |            | ARNP  [address]   |                   |
|        |           |            | St  [place] [facility], [place]  |                   |
|        |           |            | [address]  [phone]  |                   |
|        |           |            |  [phone] (Fax)  |                   |
+--------+-----------+------------+----------------------+-------------------+
| / | Hospital  | Radiology  |   Popeye Townsend, |                   |
|    | Encounter |            |  MD  [address] |                   |
|        |           |            |  St.  [place],   |                   |
|        |           |            | [place] [address]             |                   |
|        |           |            | [phone]         |                   |
|        |           |            | [phone] (Fax)   |                   |
+--------+-----------+------------+----------------------+-------------------+
| / | Surgery   | Radiology  |   Popeye Townsend, | CV EP PPM SYSTEM  |
| 2020   |           |            |  MD  [address] | IMPLANT           |
|        |           |            |  St.  [place],   |                   |
|        |           |            | [place] [address]             |                   |
|        |           |            | [phone]         |                   |
|        |           |            | [phone] (Fax)   |                   |
+--------+-----------+------------+----------------------+-------------------+
| 02/10/ | Clinical  | Cardiology |                      |                   |
|    | Support   |            |                      |                   |
+--------+-----------+------------+----------------------+-------------------+
| / | Office    | Cardiology |   Hellberg[facility] Tonia,    |                   |
|    | Visit     |            [facility]  [address]   |                   |
|        |           |            | [place]  |                   |
|        |           |            | [address]  [phone]  |                   |
|        |           |            |  [phone] (Fax)  |                   |
+--------+-----------+------------+----------------------+-------------------+
| / | Off-Site  | Nephrology |   Marzena Moser  |                   |
|    | Visit     |            | M, DO  [address]      |                   |
|        |           |            | [address]      |                   |
|        |           |            | [place]      |                   |
|        |           |            | [address]  [phone]  |                   |
|        |           |            |  [phone] (Fax)  |                   |
+--------+-----------+------------+----------------------+-------------------+
 documented as of this encounter
 
 Visit Diagnoses
 Not on filedocumented in this encounter

## 2020-01-08 NOTE — XMS
Encounter Summary
  Created on: 2020
 
 Viviana Ricci
 External Reference #: [id number]
 : 47
 Sex: Female
 
 Demographics
 
 
+-----------------------+----------------------+
| Address               | [address]      |
|                       | [place]  [address] |
+-----------------------+----------------------+
| Home Phone            | [phone]      |
+-----------------------+----------------------+
| Preferred Language    | Unknown              |
+-----------------------+----------------------+
| Marital Status        | Single               |
+-----------------------+----------------------+
| Orthodoxy Affiliation | 1009                 |
+-----------------------+----------------------+
| Race                  | Unknown              |
+-----------------------+----------------------+
| Ethnic Group          | Unknown              |
+-----------------------+----------------------+
 
 
 Author
 
 
+--------------+--------------------------------------------+
| Author       | [facility]  |
|              | and [place]                                |
+--------------+--------------------------------------------+
| Organization | [facility]  |
|              | and [place]                                |
+--------------+--------------------------------------------+
| Address      | Unknown                                    |
+--------------+--------------------------------------------+
| Phone        | Unavailable                                |
+--------------+--------------------------------------------+
 
 
 
 Support
 
 
+----------------+--------------+---------------------+-----------------+
| Name           | Relationship | Address             | Phone           |
+----------------+--------------+---------------------+-----------------+
| Ada/Ed Radhames | ECON         | [place]              | [phone] |
|                |              | [place], [place]  |                 |
|                |              |  [address]              |                 |
+----------------+--------------+---------------------+-----------------+
 
 
 
 
 Care Team Providers
 
 
+-----------------------+------+-------------+
| Care Team Member Name | Role | Phone       |
+-----------------------+------+-------------+
 PCP  | Unavailable |
+-----------------------+------+-------------+
 
 
 
 Reason for Visit
 
 
+---------+--------------------+
| Reason  | Comments           |
+---------+--------------------+
| Results | overnight oximetry |
+---------+--------------------+
 
 
 
 Encounter Details
 
 
+--------+-----------+----------------------+----------------+---------------------+
| Date   | Type      | Department           | Care Team      | Description         |
+--------+-----------+----------------------+----------------+---------------------+
| / | Telephone |   [facility] [place]          |   Offenstein,  | Results (overnight  |
|    |           | PULMONARY  [address]     | Nean GUSMAN MD  | oximetry)           |
|        |           | [address]  [place], |                |                     |
|        |           |  [place] [address]       |                |                     |
|        |           | [phone]         |                |                     |
+--------+-----------+----------------------+----------------+---------------------+
 
 
 
 Social History
 
 
+--------------+-------+-----------+--------+------+
| Tobacco Use  | Types | Packs/Day | Years  | Date |
|              |       |           | Used   |      |
+--------------+-------+-----------+--------+------+
| Never Smoker |       |           |        |      |
+--------------+-------+-----------+--------+------+
 
 
 
+---------------------+---+---+---+
| Smokeless Tobacco:  |   |   |   |
| Never Used          |   |   |   |
+---------------------+---+---+---+
 
 
 
+---------------------------------------------------------------+
| Comments: some second hand smoke exposure, but fairly minimal |
+---------------------------------------------------------------+
 
 
 
 
+-------------+-------------+---------+----------+
| Alcohol Use | Drinks/Week | oz/Week | Comments |
+-------------+-------------+---------+----------+
| No          |             |         |          |
+-------------+-------------+---------+----------+
 
 
 
+------------------+---------------+
| Sex Assigned at  | Date Recorded |
| Birth            |               |
+------------------+---------------+
| Not on file      |               |
+------------------+---------------+
 
 
 
+----------------+-------------+-------------+
| Job Start Date | Occupation  | Industry    |
+----------------+-------------+-------------+
| Not on file    | Not on file | Not on file |
+----------------+-------------+-------------+
 
 
 
+----------------+--------------+------------+
| Travel History | Travel Start | Travel End |
+----------------+--------------+------------+
 
 
 
+-------------------------------------+
| No recent travel history available. |
+-------------------------------------+
 documented as of this encounter
 
 Plan of Treatment
 
 
+--------+-----------+------------+----------------------+-------------------+
| Date   | Type      | Specialty  | Care Team            | Description       |
+--------+-----------+------------+----------------------+-------------------+
| / | Office    | Cardiology |   Tonia Wilson,    |                   |
| 2020   | Visit     |            | ARNP  [address]   |                   |
|        |           |            | St  [place]  |                   |
|        |           |            | [address]  [phone]  |                   |
|        |           |            |  [phone] (Fax)  |                   |
+--------+-----------+------------+----------------------+-------------------+
| / | Hospital  | Radiology  |   Popeye Townsend, |                   |
|    | Encounter |            |  MD  [address] |                   |
|        |           |            |  St.  [place],   |                   |
|        |           |            | [place] [address]             |                   |
|        |           |            | [phone]         |                   |
|        |           |            | [phone] (Fax)   |                   |
+--------+-----------+------------+----------------------+-------------------+
| / | Surgery   | Radiology  |   Popeye Townsend, | CV EP PPM SYSTEM  |
|    |           |            |  MD  [address] | IMPLANT           |
 
|        |           |            |  St.  [place],   |                   |
|        |           |            | [place] [address]             |                   |
|        |           |            | [phone]         |                   |
|        |           |            | [phone] (Fax)   |                   |
+--------+-----------+------------+----------------------+-------------------+
| 02/10/ | Clinical  | Cardiology |                      |                   |
|    | Support   |            |                      |                   |
+--------+-----------+------------+----------------------+-------------------+
| / | Office    | Cardiology |   Tonia Wilson,    |                   |
|    | Visit     |            | [facility]  [address]   |                   |
|        |           |            | [place]  |                   |
|        |           |            | [address]  [phone]  |                   |
|        |           |            |  [phone] (Fax)  |                   |
+--------+-----------+------------+----------------------+-------------------+
| / | Off-Site  | Nephrology |   Marzena Moser  |                   |
|    | Visit     |            | DO ETIENNE  [address] [place] [address]      |                   |
|        |           |            | [address]      |                   |
|        |           |            | [place]      |                   |
|        |           |            | [address]  [phone]  |                   |
|        |           |            |  [phone] (Fax)  |                   |
+--------+-----------+------------+----------------------+-------------------+
 documented as of this encounter
 
 Visit Diagnoses
 Not on filedocumented in this encounter

## 2020-01-08 NOTE — XMS
Encounter Summary
  Created on: 2020
 
 Viviana Ricci
 External Reference #: [id number]
 : 47
 Sex: Female
 
 Demographics
 
 
+-----------------------+----------------------+
| Address               | [address]      |
|                       | [place]  [address] |
+-----------------------+----------------------+
| Home Phone            | [phone]      |
+-----------------------+----------------------+
| Preferred Language    | Unknown              |
+-----------------------+----------------------+
| Marital Status        | Single               |
+-----------------------+----------------------+
| Orthodox Affiliation | [id number]                 |
+-----------------------+----------------------+
| Race                  | Unknown              |
+-----------------------+----------------------+
| Ethnic Group          | Unknown              |
+-----------------------+----------------------+
 
 
 Author
 
 
+--------------+--------------------------------------------+
| Author       | [facility]  |
|              | and [place]                                |
+--------------+--------------------------------------------+
| Organization | [facility]  |
|              | and [place]                                |
+--------------+--------------------------------------------+
| Address      | Unknown                                    |
+--------------+--------------------------------------------+
| Phone        | Unavailable                                |
+--------------+--------------------------------------------+
 
 
 
 Support
 
 
+----------------+--------------+---------------------+-----------------+
| Name           | Relationship | Address             | Phone           |
+----------------+--------------+---------------------+-----------------+
| Ada/Ed Radhames | ECON         | [address]              | [phone] |
|                |              | [place]  |                 |
|                |              |  [address]              |                 |
+----------------+--------------+---------------------+-----------------+
 
 
 
 
 Care Team Providers
 
 
+------------------------+------+-----------------+
| Care Team Member Name  | Role | Phone           |
+------------------------+------+-----------------+
| Marzena Moser DO | PCP  | [phone] |
+------------------------+------+-----------------+
 
 
 
 Reason for Visit
 
 
+-------------------+----------+
| Reason            | Comments |
+-------------------+----------+
| Medication Refill |          |
+-------------------+----------+
 
 
 
 Encounter Details
 
 
+--------+--------+---------------------+----------------------+-------------------+
| Date   | Type   | Department          | Care Team            | Description       |
+--------+--------+---------------------+----------------------+-------------------+
| / | Refill |   [facility]         |   Marzena Moser  | Medication Refill |
| 2019   |        | NEPHROLOGY  [address]   | M, DO  [address]      |                   |
|        |        | [address]   | [address]      |                   |
|        |        | [place], [place]     | [place], [place]      |                   |
|        |        | [address]          | [address]  [phone]  |                   |
|        |        | [phone]        |  [phone] (Fax)  |                   |
+--------+--------+---------------------+----------------------+-------------------+
 
 
 
 Social History
 
 
+--------------+-------+-----------+--------+------+
| Tobacco Use  | Types | Packs/Day | Years  | Date |
|              |       |           | Used   |      |
+--------------+-------+-----------+--------+------+
| Never Smoker |       |           |        |      |
+--------------+-------+-----------+--------+------+
 
 
 
+---------------------+---+---+---+
| Smokeless Tobacco:  |   |   |   |
| Never Used          |   |   |   |
+---------------------+---+---+---+
 
 
 
+---------------------------------------------------------------+
| Comments: some second hand smoke exposure, but fairly minimal |
 
+---------------------------------------------------------------+
 
 
 
+-------------+-------------+---------+----------+
| Alcohol Use | Drinks/Week | oz/Week | Comments |
+-------------+-------------+---------+----------+
| No          |             |         |          |
+-------------+-------------+---------+----------+
 
 
 
+------------------+---------------+
| Sex Assigned at  | Date Recorded |
| Birth            |               |
+------------------+---------------+
| Not on file      |               |
+------------------+---------------+
 
 
 
+----------------+-------------+-------------+
| Job Start Date | Occupation  | Industry    |
+----------------+-------------+-------------+
| Not on file    | Not on file | Not on file |
+----------------+-------------+-------------+
 
 
 
+----------------+--------------+------------+
| Travel History | Travel Start | Travel End |
+----------------+--------------+------------+
 
 
 
+-------------------------------------+
| No recent travel history available. |
+-------------------------------------+
 documented as of this encounter
 
 Plan of Treatment
 
 
+--------+-----------+------------+----------------------+-------------------+
| Date   | Type      | Specialty  | Care Team            | Description       |
+--------+-----------+------------+----------------------+-------------------+
| / | Office    | Cardiology |   Tonia Wilson,    |                   |
|    | Visit     |            | ARNP  [address]   |                   |
|        |           |            | St [place], [place]  |                   |
|        |           |            | [address]  [phone]  |                   |
|        |           |            |  [phone] (Fax)  |                   |
+--------+-----------+------------+----------------------+-------------------+
| / | Hospital  | Radiology  |   Popeye Townsend, |                   |
|    | Encounter |            |  MD  [address] |                   |
|        |           |            |  [place],   |                   |
|        |           |            | [place] [address]             |                   |
|        |           |            | [phone]         |                   |
|        |           |            | [phone] (Fax)   |                   |
+--------+-----------+------------+----------------------+-------------------+
| / | Surgery   | Radiology  |   Popeye Townsend, | CV EP PPM SYSTEM  |
 
|    |           |            |  MD  [address] | IMPLANT           |
|        |           |            |  [place],   |                   |
|        |           |            | [place] [address]             |                   |
|        |           |            | [phone]         |                   |
|        |           |            | [phone] (Fax)   |                   |
+--------+-----------+------------+----------------------+-------------------+
| 02/10/ | Clinical  | Cardiology |                      |                   |
|    | Support   |            |                      |                   |
+--------+-----------+------------+----------------------+-------------------+
| / | Office    | Cardiology |   Tonia Wilson,    |                   |
|    | Visit     |            | ARNP  [address]   |                   |
|        |           |            | [place]  |                   |
|        |           |            | [id number] [phone]  |                   |
|        |           |            |  [phone] (Fax)  |                   |
+--------+-----------+------------+----------------------+-------------------+
| / | Off-Site  | Nephrology |   Marzena Moser  |                   |
|    | Visit     |            | DO ETIENNE  [address] [place] [address]      |                   |
|        |           |            | [address]      |                   |
|        |           |            | [place]      |                   |
|        |           |            | [id number] [phone]  |                   |
|        |           |            |  [phone] (Fax)  |                   |
+--------+-----------+------------+----------------------+-------------------+
 documented as of this encounter
 
 Visit Diagnoses
 
 
+-------------------------------------------+
| Diagnosis                                 |
+-------------------------------------------+
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
+-------------------------------------------+
 documented in this encounter

## 2020-01-08 NOTE — XMS
Encounter Summary
  Created on: 2020
 
 Viviana Ricci
 External Reference #: [id number]
 : 47
 Sex: Female
 
 Demographics
 
 
+-----------------------+----------------------+
| Address               | [address]      |
|                       | [place]  [address] |
+-----------------------+----------------------+
| Home Phone            | [phone]      |
+-----------------------+----------------------+
| Preferred Language    | Unknown              |
+-----------------------+----------------------+
| Marital Status        | Single               |
+-----------------------+----------------------+
| Restorationist Affiliation | [id number]                 |
+-----------------------+----------------------+
| Race                  | Unknown              |
+-----------------------+----------------------+
| Ethnic Group          | Unknown              |
+-----------------------+----------------------+
 
 
 Author
 
 
+--------------+--------------------------------------------+
| Author       | [facility]  |
|              | and [place]                                |
+--------------+--------------------------------------------+
| Organization | [facility]  |
|              | and [place]                                |
+--------------+--------------------------------------------+
| Address      | Unknown                                    |
+--------------+--------------------------------------------+
| Phone        | Unavailable                                |
+--------------+--------------------------------------------+
 
 
 
 Support
 
 
+----------------+--------------+---------------------+-----------------+
| Name           | Relationship | Address             | Phone           |
+----------------+--------------+---------------------+-----------------+
| Ada/Ed Radhames | ECON         | [address]              | [phone] |
|                |              | [place]  |                 |
|                |              |  [address]              |                 |
+----------------+--------------+---------------------+-----------------+
 
 
 
 
 Care Team Providers
 
 
+-----------------------+------+-------------+
| Care Team Member Name | Role | Phone       |
+-----------------------+------+-------------+
 PCP  | Unavailable |
+-----------------------+------+-------------+
 
 
 
 Reason for Visit
 
 
+-------------------+----------+
| Reason            | Comments |
+-------------------+----------+
| Medication Refill |          |
+-------------------+----------+
 
 
 
 Encounter Details
 
 
+--------+--------+---------------------+----------------------+-------------------+
| Date   | Type   | Department          | Care Team            | Description       |
+--------+--------+---------------------+----------------------+-------------------+
| / | Refill |   [facility]         |   Marzena Moser  | Medication Refill |
|    |        | NEPHROLOGY  [address]   | M, DO  [address]      |                   |
|        |        | [address]   | [address]      |                   |
|        |        | [place], [place]     | [place], [place]      |                   |
|        |        | [address]          | [address]  [phone]  |                   |
|        |        | [phone]        |  [phone] (Fax)  |                   |
+--------+--------+---------------------+----------------------+-------------------+
 
 
 
 Social History
 
 
+--------------+-------+-----------+--------+------+
| Tobacco Use  | Types | Packs/Day | Years  | Date |
|              |       |           | Used   |      |
+--------------+-------+-----------+--------+------+
| Never Smoker |       |           |        |      |
+--------------+-------+-----------+--------+------+
 
 
 
+---------------------+---+---+---+
| Smokeless Tobacco:  |   |   |   |
| Never Used          |   |   |   |
+---------------------+---+---+---+
 
 
 
+---------------------------------------------------------------+
| Comments: some second hand smoke exposure, but fairly minimal |
 
+---------------------------------------------------------------+
 
 
 
+-------------+-------------+---------+----------+
| Alcohol Use | Drinks/Week | oz/Week | Comments |
+-------------+-------------+---------+----------+
| No          |             |         |          |
+-------------+-------------+---------+----------+
 
 
 
+------------------+---------------+
| Sex Assigned at  | Date Recorded |
| Birth            |               |
+------------------+---------------+
| Not on file      |               |
+------------------+---------------+
 
 
 
+----------------+-------------+-------------+
| Job Start Date | Occupation  | Industry    |
+----------------+-------------+-------------+
| Not on file    | Not on file | Not on file |
+----------------+-------------+-------------+
 
 
 
+----------------+--------------+------------+
| Travel History | Travel Start | Travel End |
+----------------+--------------+------------+
 
 
 
+-------------------------------------+
| No recent travel history available. |
+-------------------------------------+
 documented as of this encounter
 
 Plan of Treatment
 
 
+--------+-----------+------------+----------------------+-------------------+
| Date   | Type      | Specialty  | Care Team            | Description       |
+--------+-----------+------------+----------------------+-------------------+
| / | Office    | Cardiology |   HellalfredoTonia,    |                   |
|    | Visit     |            | ARNP  [address]   |                   |
|        |           |            | St  [place], [place]  |                   |
|        |           |            | [address]  [phone]  |                   |
|        |           |            |  [phone] (Fax)  |                   |
+--------+-----------+------------+----------------------+-------------------+
| / | Hospital  | Radiology  |   Popeye Townsend, |                   |
|    | Encounter |            |  MD  [address] |                   |
|        |           |            |  St.  [place],   |                   |
|        |           |            | [place] [address]             |                   |
|        |           |            | [phone]         |                   |
|        |           |            | [phone] (Fax)   |                   |
+--------+-----------+------------+----------------------+-------------------+
| / | Surgery   | Radiology  |   Popeye Townsend, | CV EP PPM SYSTEM  |
 
|    |           |            |  MD  [address] | IMPLANT           |
|        |           |            |  St.  [place],   |                   |
|        |           |            | [place] [address]             |                   |
|        |           |            | [phone]         |                   |
|        |           |            | [phone] (Fax)   |                   |
+--------+-----------+------------+----------------------+-------------------+
| 02/10/ | Clinical  | Cardiology |                      |                   |
|    | Support   |            |                      |                   |
+--------+-----------+------------+----------------------+-------------------+
| / | Office    | Cardiology |   Tonia Wilson,    |                   |
|    | Visit     |            | ARNP  [address]   |                   |
|        |           |            | [place]  |                   |
|        |           |            | [address]  [phone]  |                   |
|        |           |            |  [phone] (Fax)  |                   |
+--------+-----------+------------+----------------------+-------------------+
| / | Off-Site  | Nephrology |   Marzena Moser  |                   |
|    | Visit     |            | DO ETIENNE  [address] [place] [address]      |                   |
|        |           |            | [address]      |                   |
|        |           |            | [place]      |                   |
|        |           |            | [address]  [phone]  |                   |
|        |           |            |  [phone] (Fax)  |                   |
+--------+-----------+------------+----------------------+-------------------+
 documented as of this encounter
 
 Visit Diagnoses
 
 
+------------------------------------------------------------------------+
| Diagnosis                                                              |
+------------------------------------------------------------------------+
|   Essential hypertension - Primary  Unspecified essential hypertension |
+------------------------------------------------------------------------+
 documented in this encounter

## 2020-01-08 NOTE — XMS
Encounter Summary
  Created on: 2020
 
 Viviana Ricci
 External Reference #: [id number]
 : 47
 Sex: Female
 
 Demographics
 
 
+-----------------------+----------------------+
| Address               | [address]      |
|                       | [place]  [address] |
+-----------------------+----------------------+
| Home Phone            | [phone]      |
+-----------------------+----------------------+
| Preferred Language    | Unknown              |
+-----------------------+----------------------+
| Marital Status        | Single               |
+-----------------------+----------------------+
| Advent Affiliation | 1009                 |
+-----------------------+----------------------+
| Race                  | Unknown              |
+-----------------------+----------------------+
| Ethnic Group          | Unknown              |
+-----------------------+----------------------+
 
 
 Author
 
 
+--------------+--------------------------------------------+
| Author       | [facility]  |
|              | and [place]                                |
+--------------+--------------------------------------------+
| Organization | [facility]  |
|              | and [place]                                |
+--------------+--------------------------------------------+
| Address      | Unknown                                    |
+--------------+--------------------------------------------+
| Phone        | Unavailable                                |
+--------------+--------------------------------------------+
 
 
 
 Support
 
 
+----------------+--------------+---------------------+-----------------+
| Name           | Relationship | Address             | Phone           |
+----------------+--------------+---------------------+-----------------+
| Ada/Ed Radhames | ECON         | [address]              | [phone] |
|                |              | [place]  |                 |
|                |              |  [address]              |                 |
+----------------+--------------+---------------------+-----------------+
 
 
 
 
 Care Team Providers
 
 
+-----------------------+------+-------------+
| Care Team Member Name | Role | Phone       |
+-----------------------+------+-------------+
 PCP  | Unavailable |
+-----------------------+------+-------------+
 
 
 
 Encounter Details
 
 
+--------+-----------+----------------------+-----------+-------------+
| Date   | Type      | Department           | Care Team | Description |
+--------+-----------+----------------------+-----------+-------------+
| / | Hospital  |   [facility] |           |             |
|    | Encounter |  [facility] |           |             |
|        |           |   [address]       |           |             |
|        |           | [place]      |           |             |
|        |           | [address]           |           |             |
|        |           | [phone]         |           |             |
+--------+-----------+----------------------+-----------+-------------+
 
 
 
 Social History
 
 
+----------------+-------+-----------+--------+------+
| Tobacco Use    | Types | Packs/Day | Years  | Date |
|                |       |           | Used   |      |
+----------------+-------+-----------+--------+------+
| Never Assessed |       |           |        |      |
+----------------+-------+-----------+--------+------+
 
 
 
+------------------+---------------+
| Sex Assigned at  | Date Recorded |
| Birth            |               |
+------------------+---------------+
| Not on file      |               |
+------------------+---------------+
 
 
 
+----------------+-------------+-------------+
| Job Start Date | Occupation  | Industry    |
+----------------+-------------+-------------+
| Not on file    | Not on file | Not on file |
+----------------+-------------+-------------+
 
 
 
+----------------+--------------+------------+
| Travel History | Travel Start | Travel End |
+----------------+--------------+------------+
 
 
 
 
+-------------------------------------+
| No recent travel history available. |
+-------------------------------------+
 documented as of this encounter
 
 Plan of Treatment
 
 
+--------+-----------+------------+----------------------+-------------------+
| Date   | Type      | Specialty  | Care Team            | Description       |
+--------+-----------+------------+----------------------+-------------------+
| / | Office    | Cardiology |   Tonia Wilson,    |                   |
|    | Visit     |            | ARNJESSICA  [address]   |                   |
|        |           |            | St  [place] [facility], [place]  |                   |
|        |           |            | [address]  [phone]  |                   |
|        |           |            |  [phone] (Fax)  |                   |
+--------+-----------+------------+----------------------+-------------------+
| / | Hospital  | Radiology  |   Popeye Townsend, |                   |
|    | Encounter |            |  MD  [address] |                   |
|        |           |            |  St.  [place],   |                   |
|        |           |            | [place] [address]             |                   |
|        |           |            | [phone]         |                   |
|        |           |            | [phone] (Fax)   |                   |
+--------+-----------+------------+----------------------+-------------------+
| / | Surgery   | Radiology  |   Popeye Townsend, | CV EP PPM SYSTEM  |
|    |           |            |  MD  [address] | IMPLANT           |
|        |           |            |  St.  [place],   |                   |
|        |           |            | [place] [address]             |                   |
|        |           |            | [phone]         |                   |
|        |           |            | [phone] (Fax)   |                   |
+--------+-----------+------------+----------------------+-------------------+
| 02/10/ | Clinical  | Cardiology |                      |                   |
|    | Support   |            |                      |                   |
+--------+-----------+------------+----------------------+-------------------+
| / | Office    | Cardiology |   Tonia Wilson,    |                   |
|    | Visit     |            | [facility]  [address]   |                   |
|        |           |            | [place]  |                   |
|        |           |            | [address]  [phone]  |                   |
|        |           |            |  [phone] (Fax)  |                   |
+--------+-----------+------------+----------------------+-------------------+
| / | Off-Site  | Nephrology |   Marzena Moser  |                   |
|    | Visit     |            | DO ETIENNE  [address] [place] [address]      |                   |
|        |           |            | [address]      |                   |
|        |           |            | [place]      |                   |
|        |           |            | [address] [phone]  |                   |
|        |           |            |  [phone] (Fax)  |                   |
+--------+-----------+------------+----------------------+-------------------+
 documented as of this encounter
 
 Visit Diagnoses
 Not on filedocumented in this encounter

## 2020-01-08 NOTE — XMS
Encounter Summary
  Created on: 2020
 
 Viviana Ricci
 External Reference #: [id number]
 : 47
 Sex: Female
 
 Demographics
 
 
+-----------------------+----------------------+
| Address               | [address]      |
|                       | [place]  [address] |
+-----------------------+----------------------+
| Home Phone            | [phone]      |
+-----------------------+----------------------+
| Preferred Language    | Unknown              |
+-----------------------+----------------------+
| Marital Status        | Single               |
+-----------------------+----------------------+
| [employer] Affiliation | 1009                 |
+-----------------------+----------------------+
| Race                  | Unknown              |
+-----------------------+----------------------+
| Ethnic Group          | Unknown              |
+-----------------------+----------------------+
 
 
 Author
 
 
+--------------+--------------------------------------------+
| Author       | [facility]  |
|              | and [place]                                |
+--------------+--------------------------------------------+
| Organization | [facility]  |
|              | and [place]                                |
+--------------+--------------------------------------------+
| Address      | Unknown                                    |
+--------------+--------------------------------------------+
| Phone        | Unavailable                                |
+--------------+--------------------------------------------+
 
 
 
 Support
 
 
+----------------+--------------+---------------------+-----------------+
| Name           | Relationship | Address             | Phone           |
+----------------+--------------+---------------------+-----------------+
| Ada/Ed Radhames | ECON         | [address]              | [phone] |
|                |              | [place]  |                 |
|                |              |  [address]              |                 |
+----------------+--------------+---------------------+-----------------+
 
 
 
 
 Care Team Providers
 
 
+-----------------------+------+-------------+
| Care Team Member Name | Role | Phone       |
+-----------------------+------+-------------+
 PCP  | Unavailable |
+-----------------------+------+-------------+
 
 
 
 Encounter Details
 
 
+--------+----------+---------------------+----------------------+-------------+
| Date   | Type     | Department          | Care Team            | Description |
+--------+----------+---------------------+----------------------+-------------+
| / | Abstract |   [facility] [place]         |   Marzena Moser  |             |
| 2017   |          | NEPHROLOGY  [address]   | M, DO  [address]      |             |
|        |          | [address]   | [address]      |             |
|        |          | [place], [place]     | [place]      |             |
|        |          | [address]          | [address]  [phone]  |             |
|        |          | [phone]        |  [phone] (Fax)  |             |
+--------+----------+---------------------+----------------------+-------------+
 
 
 
 Social History
 
 
+--------------+-------+-----------+--------+------+
| Tobacco Use  | Types | Packs/Day | Years  | Date |
|              |       |           | Used   |      |
+--------------+-------+-----------+--------+------+
| Never Smoker |       |           |        |      |
+--------------+-------+-----------+--------+------+
 
 
 
+---------------------+---+---+---+
| Smokeless Tobacco:  |   |   |   |
| Never Used          |   |   |   |
+---------------------+---+---+---+
 
 
 
+---------------------------------------------------------------+
| Comments: some second hand smoke exposure, but fairly minimal |
+---------------------------------------------------------------+
 
 
 
+-------------+-------------+---------+----------+
| Alcohol Use | Drinks/Week | oz/Week | Comments |
+-------------+-------------+---------+----------+
| No          |             |         |          |
+-------------+-------------+---------+----------+
 
 
 
 
+------------------+---------------+
| Sex Assigned at  | Date Recorded |
| Birth            |               |
+------------------+---------------+
| Not on file      |               |
+------------------+---------------+
 
 
 
+----------------+-------------+-------------+
| Job Start Date | Occupation  | Industry    |
+----------------+-------------+-------------+
| Not on file    | Not on file | Not on file |
+----------------+-------------+-------------+
 
 
 
+----------------+--------------+------------+
| Travel History | Travel Start | Travel End |
+----------------+--------------+------------+
 
 
 
+-------------------------------------+
| No recent travel history available. |
+-------------------------------------+
 documented as of this encounter
 
 Plan of Treatment
 
 
+--------+-----------+------------+----------------------+-------------------+
| Date   | Type      | Specialty  | Care Team            | Description       |
+--------+-----------+------------+----------------------+-------------------+
| / | Office    | Cardiology |   Tonia Wilson,    |                   |
|    | Visit     |            | ARNJESSICA  [address]   |                   |
|        |           |            | [place]  |                   |
|        |           |            | [address]  [phone]  |                   |
|        |           |            |  [phone] (Fax)  |                   |
+--------+-----------+------------+----------------------+-------------------+
| / | Hospital  | Radiology  |   Popeye Townsend, |                   |
|    | Encounter |            |  MD  [address] |                   |
|        |           |            |  St.  [place],   |                   |
|        |           |            | [place] [address]             |                   |
|        |           |            | [phone]         |                   |
|        |           |            | [phone] (Fax)   |                   |
+--------+-----------+------------+----------------------+-------------------+
| / | Surgery   | Radiology  |   Popeye Townsend, | CV EP PPM SYSTEM  |
|    |           |            |  MD  [address] | IMPLANT           |
|        |           |            |  St.  [place],   |                   |
|        |           |            | [place] [address]             |                   |
|        |           |            | [phone]         |                   |
|        |           |            | [phone] (Fax)   |                   |
+--------+-----------+------------+----------------------+-------------------+
| 02/10/ | Clinical  | Cardiology |                      |                   |
|    | Support   |            |                      |                   |
+--------+-----------+------------+----------------------+-------------------+
| / | Office    | Cardiology |   Tonia Wilson,    |                   |
|    | Visit     |            | ARNP  [address]   |                   |
 
|        |           |            | St  [place], [place]  |                   |
|        |           |            | [address]  [phone]  |                   |
|        |           |            |  [phone] (Fax)  |                   |
+--------+-----------+------------+----------------------+-------------------+
| / | Off-Site  | Nephrology |   Marzena Moser  |                   |
|    | Visit     |            | DO ETIENNE  [address] [place] [address]      |                   |
|        |           |            | [address]      |                   |
|        |           |            | [place]      |                   |
|        |           |            | [address]  [phone]  |                   |
|        |           |            |  [phone] (Fax)  |                   |
+--------+-----------+------------+----------------------+-------------------+
 documented as of this encounter
 
 Procedures
 
 
+------------------+--------+------------+----------------------+----------------------+
| Procedure Name   | Priori | Date/Time  | Associated Diagnosis | Comments             |
|                  | ty     |            |                      |                      |
+------------------+--------+------------+----------------------+----------------------+
| EXTERNAL LAB:    | Routin | 2017 |                      |   Results for this   |
| URINALYSIS       | e      |            |                      | procedure are in the |
|                  |        |            |                      |  results section.    |
+------------------+--------+------------+----------------------+----------------------+
| URINALYSIS WITH  | Routin | 2017 |                      |   Results for this   |
| MICROSCOPIC      | e      |            |                      | procedure are in the |
|                  |        |            |                      |  results section.    |
+------------------+--------+------------+----------------------+----------------------+
 documented in this encounter
 
 Results
 Urinalysis With Microscopic (2017)
 
+-------------+----------+---------------+------------+--------------+
| Component   | Value    | Ref Range     | Performed  | Pathologist  |
|             |          |               | At         | Signature    |
+-------------+----------+---------------+------------+--------------+
| WBC UA      | 50       | /HPF          |            |              |
+-------------+----------+---------------+------------+--------------+
| BACTERIA UA | Negative | Negative /HPF |            |              |
+-------------+----------+---------------+------------+--------------+
| EPITHELIAL  | 0-2      | 0 - 2 /LPF    |            |              |
| CASTS UA    |          |               |            |              |
+-------------+----------+---------------+------------+--------------+
 
 
 
+-----------------+
| Specimen        |
+-----------------+
| Urine specimen  |
| (specimen)      |
+-----------------+
 External Lab: Urinalysis (2017)
 
+-------------+----------+-----------+------------+--------------+
| Component   | Value    | Ref Range | Performed  | Pathologist  |
|             |          |           | At         | Signature    |
+-------------+----------+-----------+------------+--------------+
| UA Blood,   | Negative |           | EXTERNAL   |              |
 
| External    |          |           | LAB        |              |
+-------------+----------+-----------+------------+--------------+
| UA Glucose, | normal   |           | EXTERNAL   |              |
|  External   |          |           | LAB        |              |
+-------------+----------+-----------+------------+--------------+
| UA Ketones, | negative |           | EXTERNAL   |              |
|  External   |          |           | LAB        |              |
+-------------+----------+-----------+------------+--------------+
| UA Ph,      | 5        |           | EXTERNAL   |              |
| External    |          |           | LAB        |              |
+-------------+----------+-----------+------------+--------------+
| UA          | normal   |           | EXTERNAL   |              |
| Proteins,   |          |           | LAB        |              |
| External    |          |           |            |              |
+-------------+----------+-----------+------------+--------------+
| UA RBC,     | 0        |           | EXTERNAL   |              |
| External    |          |           | LAB        |              |
+-------------+----------+-----------+------------+--------------+
| UA Specific | 1.012    |           | EXTERNAL   |              |
|  Gravity,   |          |           | LAB        |              |
| External    |          |           |            |              |
+-------------+----------+-----------+------------+--------------+
| UA          | 100      |           | EXTERNAL   |              |
| Leukocyte   |          |           | LAB        |              |
| Esterase,   |          |           |            |              |
| External    |          |           |            |              |
+-------------+----------+-----------+------------+--------------+
 
 
 
+--------------------------+
| Resulting Agency Comment |
+--------------------------+
|   [facility]              |
+--------------------------+
 
 
 
+----------------+---------+--------------------+--------------+
| Performing     | Address | City/State/Zipcode | Phone Number |
| Organization   |         |                    |              |
+----------------+---------+--------------------+--------------+
|   EXTERNAL LAB |         |                    |              |
+----------------+---------+--------------------+--------------+
 documented in this encounter
 
 Visit Diagnoses
 Not on filedocumented in this encounter

## 2020-01-08 NOTE — XMS
Encounter Summary
  Created on: 2020
 
 Viviana Ricci
 External Reference #: [id number]
 : 47
 Sex: Female
 
 Demographics
 
 
+-----------------------+----------------------+
| Address               | [address]      |
|                       | [place]  [address] |
+-----------------------+----------------------+
| Home Phone            | [phone]      |
+-----------------------+----------------------+
| Preferred Language    | Unknown              |
+-----------------------+----------------------+
| Marital Status        | Single               |
+-----------------------+----------------------+
| Episcopalian Affiliation | [id number]                 |
+-----------------------+----------------------+
| Race                  | Unknown              |
+-----------------------+----------------------+
| Ethnic Group          | Unknown              |
+-----------------------+----------------------+
 
 
 Author
 
 
+--------------+--------------------------------------------+
| Author       | [facility]  |
|              | and [place]                                |
+--------------+--------------------------------------------+
| Organization | [facility]  |
|              | and [place]                                |
+--------------+--------------------------------------------+
| Address      | Unknown                                    |
+--------------+--------------------------------------------+
| Phone        | Unavailable                                |
+--------------+--------------------------------------------+
 
 
 
 Support
 
 
+----------------+--------------+---------------------+-----------------+
| Name           | Relationship | Address             | Phone           |
+----------------+--------------+---------------------+-----------------+
| Ada/Ed Radhames | ECON         | [place]              | [phone] |
|                |              | [place], [place]  |                 |
|                |              |  [address]              |                 |
+----------------+--------------+---------------------+-----------------+
 
 
 
 
 Care Team Providers
 
 
+-----------------------+------+-------------+
| Care Team Member Name | Role | Phone       |
+-----------------------+------+-------------+
 PCP  | Unavailable |
+-----------------------+------+-------------+
 
 
 
 Encounter Details
 
 
+--------+-----------+----------------------+----------------------+-------------+
| Date   | Type      | Department           | Care Team            | Description |
+--------+-----------+----------------------+----------------------+-------------+
| / | [facility]  |   [facility] |   Edgar Sanders,   |             |
|  - | Encounter |  MED CTR GENERIC IP  | MD  [address] [facility]     |             |
|        |           | CONV DEPT  [address]     | [place]      |             |
| / |           | [place], | [address]  [phone]  |             |
|    |           |  [place] [address]       |  [phone] (Fax)  |             |
|        |           | [phone]         |                      |             |
+--------+-----------+----------------------+----------------------+-------------+
 
 
 
 Social History
 
 
+----------------+-------+-----------+--------+------+
| Tobacco Use    | Types | Packs/Day | Years  | Date |
|                |       |           | Used   |      |
+----------------+-------+-----------+--------+------+
| Never Assessed |       |           |        |      |
+----------------+-------+-----------+--------+------+
 
 
 
+------------------+---------------+
| Sex Assigned at  | Date Recorded |
| Birth            |               |
+------------------+---------------+
| Not on file      |               |
+------------------+---------------+
 
 
 
+----------------+-------------+-------------+
| Job Start Date | Occupation  | Industry    |
+----------------+-------------+-------------+
| Not on file    | Not on file | Not on file |
+----------------+-------------+-------------+
 
 
 
+----------------+--------------+------------+
| Travel History | Travel Start | Travel End |
+----------------+--------------+------------+
 
 
 
 
+-------------------------------------+
| No recent travel history available. |
+-------------------------------------+
 documented as of this encounter
 
 Plan of Treatment
 
 
+--------+-----------+------------+----------------------+-------------------+
| Date   | Type      | Specialty  | Care Team            | Description       |
+--------+-----------+------------+----------------------+-------------------+
| / | Office    | Cardiology |   Tonia Wilson,    |                   |
|    | Visit     |            | ARNJESSICA  [address]   |                   |
|        |           |            | St  [facility] [place]  |                   |
|        |           |            | [address]  [phone]  |                   |
|        |           |            |  [phone] (Fax)  |                   |
+--------+-----------+------------+----------------------+-------------------+
| / | Hospital  | Radiology  |   Popeye Townsend, |                   |
|    | Encounter |            |  MD  [address] |                   |
|        |           |            |  [place],   |                   |
|        |           |            | [place] [address]             |                   |
|        |           |            | [phone]         |                   |
|        |           |            | [phone] (Fax)   |                   |
+--------+-----------+------------+----------------------+-------------------+
| / | Surgery   | Radiology  |   Popeye Twonsend, | CV EP PPM SYSTEM  |
|    |           |            |  MD  [address] | IMPLANT           |
|        |           |            |  St.  [place],   |                   |
|        |           |            | [place] [address]             |                   |
|        |           |            | [phone]         |                   |
|        |           |            | [phone] (Fax)   |                   |
+--------+-----------+------------+----------------------+-------------------+
| 02/10/ | Clinical  | Cardiology |                      |                   |
|    | Support   |            |                      |                   |
+--------+-----------+------------+----------------------+-------------------+
| / | Office    | Cardiology |   Tonia Wilson,    |                   |
|    | Visit     |            | BELKIS  [address]   |                   |
|        |           |            | [place]  |                   |
|        |           |            | [address] [phone]  |                   |
|        |           |            |  [phone] (Fax)  |                   |
+--------+-----------+------------+----------------------+-------------------+
| / | Off-Site  | Nephrology |   Marzena Moser  |                   |
|    | Visit     |            | DO ETIENNE  [address] [place] [address]      |                   |
|        |           |            | [address]      |                   |
|        |           |            | [place]      |                   |
|        |           |            | [address] [phone]  |                   |
|        |           |            |  [phone] (Fax)  |                   |
+--------+-----------+------------+----------------------+-------------------+
 documented as of this encounter
 
 Visit Diagnoses
 Not on filedocumented in this encounter

## 2020-01-08 NOTE — XMS
Encounter Summary
  Created on: 2020
 
 Viviana Ricci
 External Reference #: [id number]
 : 47
 Sex: Female
 
 Demographics
 
 
+-----------------------+----------------------+
| Address               | [address]      |
|                       | [place]  [address] |
+-----------------------+----------------------+
| Home Phone            | [phone]      |
+-----------------------+----------------------+
| Preferred Language    | Unknown              |
+-----------------------+----------------------+
| Marital Status        | Single               |
+-----------------------+----------------------+
| [employer] Affiliation | 1009                 |
+-----------------------+----------------------+
| Race                  | Unknown              |
+-----------------------+----------------------+
| Ethnic Group          | Unknown              |
+-----------------------+----------------------+
 
 
 Author
 
 
+--------------+--------------------------------------------+
| Author       | [facility]  |
|              | and [place]                                |
+--------------+--------------------------------------------+
| Organization | [facility]  |
|              | and [place]                                |
+--------------+--------------------------------------------+
| Address      | Unknown                                    |
+--------------+--------------------------------------------+
| Phone        | Unavailable                                |
+--------------+--------------------------------------------+
 
 
 
 Support
 
 
+----------------+--------------+---------------------+-----------------+
| Name           | Relationship | Address             | Phone           |
+----------------+--------------+---------------------+-----------------+
| Ada/Ed Radhames | ECON         | [place]              | [phone] |
|                |              | [place]  |                 |
|                |              |  [address]              |                 |
+----------------+--------------+---------------------+-----------------+
 
 
 
 
 Care Team Providers
 
 
+-----------------------+------+-------------+
| Care Team Member Name | Role | Phone       |
+-----------------------+------+-------------+
 PCP  | Unavailable |
+-----------------------+------+-------------+
 
 
 
 Reason for Visit
 
 
+-------------------+----------+
| Reason            | Comments |
+-------------------+----------+
| Medication Refill |          |
+-------------------+----------+
 
 
 
 Encounter Details
 
 
+--------+--------+---------------------+----------------------+-------------------+
| Date   | Type   | Department          | Care Team            | Description       |
+--------+--------+---------------------+----------------------+-------------------+
| 10/01/ | Refill |   [facility]         |   Marzena Moser  | Medication Refill |
|    |        | NEPHROLOGY  [address]   | M, DO  [address]      |                   |
|        |        | [address]   | [address]      |                   |
|        |        | [place], [place]     | [place], [place]      |                   |
|        |        | [address]          | [address]  [phone]  |                   |
|        |        | [phone]        |  [phone] (Fax)  |                   |
+--------+--------+---------------------+----------------------+-------------------+
 
 
 
 Social History
 
 
+----------------+-------+-----------+--------+------+
| Tobacco Use    | Types | Packs/Day | Years  | Date |
|                |       |           | Used   |      |
+----------------+-------+-----------+--------+------+
| Never Assessed |       |           |        |      |
+----------------+-------+-----------+--------+------+
 
 
 
+------------------+---------------+
| Sex Assigned at  | Date Recorded |
| Birth            |               |
+------------------+---------------+
| Not on file      |               |
+------------------+---------------+
 
 
 
 
+----------------+-------------+-------------+
| Job Start Date | Occupation  | Industry    |
+----------------+-------------+-------------+
| Not on file    | Not on file | Not on file |
+----------------+-------------+-------------+
 
 
 
+----------------+--------------+------------+
| Travel History | Travel Start | Travel End |
+----------------+--------------+------------+
 
 
 
+-------------------------------------+
| No recent travel history available. |
+-------------------------------------+
 documented as of this encounter
 
 Plan of Treatment
 
 
+--------+-----------+------------+----------------------+-------------------+
| Date   | Type      | Specialty  | Care Team            | Description       |
+--------+-----------+------------+----------------------+-------------------+
| / | Office    | Cardiology |   Tonia Wilson,    |                   |
|    | Visit     |            | ARNJESSICA  [address]   |                   |
|        |           |            | St  [place], [place]  |                   |
|        |           |            | [address]  [phone]  |                   |
|        |           |            |  [phone] (Fax)  |                   |
+--------+-----------+------------+----------------------+-------------------+
| / | Hospital  | Radiology  |   Popeye Townsend, |                   |
|    | Encounter |            |  MD  [address] |                   |
|        |           |            |  St.  [place],   |                   |
|        |           |            | [place] [address]             |                   |
|        |           |            | [phone]         |                   |
|        |           |            | [phone] (Fax)   |                   |
+--------+-----------+------------+----------------------+-------------------+
| / | Surgery   | Radiology  |   Popeye Townsend, | CV EP PPM SYSTEM  |
2020   |           |            |  MD  [address] | IMPLANT           |
|        |           |            |  St.  [place],   |                   |
|        |           |            | [place] [address]             |                   |
|        |           |            | [phone]         |                   |
|        |           |            | [phone] (Fax)   |                   |
+--------+-----------+------------+----------------------+-------------------+
| 02/10/ | Clinical  | Cardiology |                      |                   |
|    | Support   |            |                      |                   |
+--------+-----------+------------+----------------------+-------------------+
| / | Office    | Cardiology |   Tonia Wilson,    |                   |
|    | Visit     |            | [place] [address]   |                   |
|        |           |            | [place]  |                   |
|        |           |            | [address]  [phone]  |                   |
|        |           |            |  [phone] (Fax)  |                   |
+--------+-----------+------------+----------------------+-------------------+
| / | Off-Site  | Nephrology |   Marzena Moser  |                   |
|    | Visit     |            | DO ETIENNE  [address] [place] [address]      |                   |
|        |           |            | [address]      |                   |
|        |           |            | [place]      |                   |
|        |           |            | [address]  [phone]  |                   |
|        |           |            |  [phone] (Fax)  |                   |
 
+--------+-----------+------------+----------------------+-------------------+
 documented as of this encounter
 
 Visit Diagnoses
 Not on filedocumented in this encounter

## 2020-01-08 NOTE — XMS
Encounter Summary
  Created on: 2020
 
 Viviana Ricci
 External Reference #: [id number]
 : 47
 Sex: Female
 
 Demographics
 
 
+-----------------------+----------------------+
| Address               | [address]      |
|                       | [place]  [address] |
+-----------------------+----------------------+
| Home Phone            | [phone]      |
+-----------------------+----------------------+
| Preferred Language    | Unknown              |
+-----------------------+----------------------+
| Marital Status        | Single               |
+-----------------------+----------------------+
| Mormonism Affiliation | [id number]                 |
+-----------------------+----------------------+
| Race                  | Unknown              |
+-----------------------+----------------------+
| Ethnic Group          | Unknown              |
+-----------------------+----------------------+
 
 
 Author
 
 
+--------------+--------------------------------------------+
| Author       | [facility]  |
|              | and [facility]                                |
+--------------+--------------------------------------------+
| Organization | [facility]  |
|              | and [facility]                                |
+--------------+--------------------------------------------+
| Address      | Unknown                                    |
+--------------+--------------------------------------------+
| Phone        | Unavailable                                |
+--------------+--------------------------------------------+
 
 
 
 Support
 
 
+----------------+--------------+---------------------+-----------------+
| Name           | Relationship | Address             | Phone           |
+----------------+--------------+---------------------+-----------------+
| Ada/Ed Radhames | ECON         | [address]              | [phone] |
|                |              | [place]  |                 |
|                |              |  [address]              |                 |
+----------------+--------------+---------------------+-----------------+
 
 
 
 
 Care Team Providers
 
 
+-----------------------+------+-------------+
| Care Team Member Name | Role | Phone       |
+-----------------------+------+-------------+
 PCP  | Unavailable |
+-----------------------+------+-------------+
 
 
 
 Encounter Details
 
 
+--------+----------+---------------------+----------------------+-------------+
| Date   | Type     | Department          | Care Team            | Description |
+--------+----------+---------------------+----------------------+-------------+
| / | Abstract |   [facility] [place]         |   Marzena Moser  |             |
|    |          | NEPHROLOGY  [address]   | M, DO  [address]      |             |
|        |          | [address]   | [address]      |             |
|        |          | [place], [place]     | [place]      |             |
|        |          | [address]          | [address]  [phone]  |             |
|        |          | [phone]        |  [phone] (Fax)  |             |
+--------+----------+---------------------+----------------------+-------------+
 
 
 
 Social History
 
 
+--------------+-------+-----------+--------+------+
| Tobacco Use  | Types | Packs/Day | Years  | Date |
|              |       |           | Used   |      |
+--------------+-------+-----------+--------+------+
| Never Smoker |       |           |        |      |
+--------------+-------+-----------+--------+------+
 
 
 
+---------------------+---+---+---+
| Smokeless Tobacco:  |   |   |   |
| Never Used          |   |   |   |
+---------------------+---+---+---+
 
 
 
+-------------+-------------+---------+----------+
| Alcohol Use | Drinks/Week | oz/Week | Comments |
+-------------+-------------+---------+----------+
| No          |             |         |          |
+-------------+-------------+---------+----------+
 
 
 
+------------------+---------------+
| Sex Assigned at  | Date Recorded |
| Birth            |               |
+------------------+---------------+
| Not on file      |               |
 
+------------------+---------------+
 
 
 
+----------------+-------------+-------------+
| Job Start Date | Occupation  | Industry    |
+----------------+-------------+-------------+
| Not on file    | Not on file | Not on file |
+----------------+-------------+-------------+
 
 
 
+----------------+--------------+------------+
| Travel History | Travel Start | Travel End |
+----------------+--------------+------------+
 
 
 
+-------------------------------------+
| No recent travel history available. |
+-------------------------------------+
 documented as of this encounter
 
 Plan of Treatment
 
 
+--------+-----------+------------+----------------------+-------------------+
| Date   | Type      | Specialty  | Care Team            | Description       |
+--------+-----------+------------+----------------------+-------------------+
| / | Office    | Cardiology |   Tonia Wilson,    |                   |
|    | Visit     |            | ARNP  [address]   |                   |
|        |           |            | [place]  |                   |
|        |           |            | [address] [phone]  |                   |
|        |           |            |  [phone] (Fax)  |                   |
+--------+-----------+------------+----------------------+-------------------+
| / | Hospital  | Radiology  |   Popeye Townsend, |                   |
|    | Encounter |            |  MD  [address] |                   |
|        |           |            |  [place]   |                   |
|        |           |            | [place] [address]             |                   |
|        |           |            | [phone]         |                   |
|        |           |            | [phone] (Fax)   |                   |
+--------+-----------+------------+----------------------+-------------------+
| / | Surgery   | Radiology  |   CaryYinmarissa, | CV EP PPM SYSTEM  |
2020   |           |            |  MD  [address] | IMPLANT           |
|        |           |            |  [place],   |                   |
|        |           |            | [place] [address]             |                   |
|        |           |            | [phone]         |                   |
|        |           |            | [phone] (Fax)   |                   |
+--------+-----------+------------+----------------------+-------------------+
| 02/10/ | Clinical  | Cardiology |                      |                   |
|    | Support   |            |                      |                   |
+--------+-----------+------------+----------------------+-------------------+
| / | Office    | Cardiology |   Tonia Wilson,    |                   |
2020   | Visit     |            | BELKIS  [address]   |                   |
|        |           |            |   [place]  |                   |
|        |           |            | [address]  [phone]  |                   |
|        |           |            |  [phone] (Fax)  |                   |
+--------+-----------+------------+----------------------+-------------------+
| / | Off-Site  | Nephrology |   Marzena Moser  |                   |
2020   | Visit     |            | DO [address]      |                   |
 
|        |           |            | [address]      |                   |
|        |           |            | [place]      |                   |
|        |           |            | [address]  [phone]  |                   |
|        |           |            |  [phone] (Fax)  |                   |
+--------+-----------+------------+----------------------+-------------------+
 documented as of this encounter
 
 Procedures
 
 
+---------------------+--------+------------+----------------------+----------------------+
| Procedure Name      | Priori | Date/Time  | Associated Diagnosis | Comments             |
|                     | ty     |            |                      |                      |
+---------------------+--------+------------+----------------------+----------------------+
| TACROLIMUS ()  | Routin | 2013 |                      |   Results for this   |
| TROUGH              | e      |            |                      | procedure are in the |
|                     |        |            |                      |  results section.    |
+---------------------+--------+------------+----------------------+----------------------+
 documented in this encounter
 
 Results
 Tacrolimus () Level (2013)
 
+-------------+-------+-----------+-------------+--------------+
| Component   | Value | Ref Range | Performed   | Pathologist  |
|             |       |           | At          | Signature    |
+-------------+-------+-----------+-------------+--------------+
| Tacrolimus  | 5.8   |           | [facility]  |              |
| Level       |       |           | [facility]    |              |
|             |       |           | [facility]     |              |
|             |       |           | [facility]    |              |
|             |       |           | LABORATORY  |              |
+-------------+-------+-----------+-------------+--------------+
 
 
 
+-----------------+
| Specimen        |
+-----------------+
| Blood specimen  |
| (specimen)      |
+-----------------+
 
 
 
+----------------------+--------------------+--------------------+----------------+
| Performing           | Address            | City/State/Zipcode | Phone Number   |
| Organization         |                    |                    |                |
+----------------------+--------------------+--------------------+----------------+
|   [facility]     |   [address] | [place]    |   [phone] |
| [facility]  |                    | [address]              |                |
| - LABORATORY         |                    |                    |                |
+----------------------+--------------------+--------------------+----------------+
|   [facility]     |   [address] | [place]    |                |
| [facility]  |                    | [address]              |                |
| - LABORATORY         |                    |                    |                |
+----------------------+--------------------+--------------------+----------------+
 documented in this encounter
 
 Visit Diagnoses
 
 Not on filedocumented in this encounter

## 2020-01-08 NOTE — XMS
Encounter Summary
  Created on: 2020
 
 Viviana Ricci
 External Reference #: [id number]
 : 47
 Sex: Female
 
 Demographics
 
 
+-----------------------+----------------------+
| Address               | [address]      |
|                       | [place]  [address] |
+-----------------------+----------------------+
| Home Phone            | [phone]      |
+-----------------------+----------------------+
| Preferred Language    | Unknown              |
+-----------------------+----------------------+
| Marital Status        | Single               |
+-----------------------+----------------------+
| Congregational Affiliation | [id number]                 |
+-----------------------+----------------------+
| Race                  | Unknown              |
+-----------------------+----------------------+
| Ethnic Group          | Unknown              |
+-----------------------+----------------------+
 
 
 Author
 
 
+--------------+--------------------------------------------+
| Author       | [facility]  |
|              | and [facility]                                |
+--------------+--------------------------------------------+
| Organization | [facility]  |
|              | and [facility]                                |
+--------------+--------------------------------------------+
| Address      | Unknown                                    |
+--------------+--------------------------------------------+
| Phone        | Unavailable                                |
+--------------+--------------------------------------------+
 
 
 
 Support
 
 
+----------------+--------------+---------------------+-----------------+
| Name           | Relationship | Address             | Phone           |
+----------------+--------------+---------------------+-----------------+
| Ada/Ed Radhames | ECON         | [address]              | [phone] |
|                |              | [place]  |                 |
|                |              |  [id number]              |                 |
+----------------+--------------+---------------------+-----------------+
 
 
 
 
 Care Team Providers
 
 
+-----------------------+------+-------------+
| Care Team Member Name | Role | Phone       |
+-----------------------+------+-------------+
 PCP  | Unavailable |
+-----------------------+------+-------------+
 
 
 
 Reason for Visit
 
 
+--------+----------+
| Reason | Comments |
+--------+----------+
| Cough  |          |
+--------+----------+
 
 
 
 Encounter Details
 
 
+--------+---------+----------------------+----------------+----------------------+
| Date   | Type    | Department           | Care Team      | Description          |
+--------+---------+----------------------+----------------+----------------------+
| / | Office  |   [facility] [place]          |   Offenstein,  | Dyspnea (Primary     |
|    | Visit   | PULMONARY  [address]     | Nena GUSMAN MD  | Dx); Cough; Allergic |
|        |         | [place], |                |  rhinitis; Pulmonary |
|        |         |  [place] [address]       |                |  hypertension (HCC); |
|        |         | [phone]         |                |  Hypervolemia;       |
|        |         |                      |                | Obesity              |
|        |         |                      |                | hypoventilation      |
|        |         |                      |                | syndrome (HCC); JACK  |
|        |         |                      |                | on CPAP;             |
|        |         |                      |                | Hypothyroidism       |
+--------+---------+----------------------+----------------+----------------------+
 
 
 
 Social History
 
 
+--------------+-------+-----------+--------+------+
| Tobacco Use  | Types | Packs/Day | Years  | Date |
|              |       |           | Used   |      |
+--------------+-------+-----------+--------+------+
| Never Smoker |       |           |        |      |
+--------------+-------+-----------+--------+------+
 
 
 
+---------------------+---+---+---+
| Smokeless Tobacco:  |   |   |   |
| Never Used          |   |   |   |
+---------------------+---+---+---+
 
 
 
 
+---------------------------------------------------------------+
| Comments: some second hand smoke exposure, but fairly minimal |
+---------------------------------------------------------------+
 
 
 
+-------------+-------------+---------+----------+
| Alcohol Use | Drinks/Week | oz/Week | Comments |
+-------------+-------------+---------+----------+
| No          |             |         |          |
+-------------+-------------+---------+----------+
 
 
 
+------------------+---------------+
| Sex Assigned at  | Date Recorded |
| Birth            |               |
+------------------+---------------+
| Not on file      |               |
+------------------+---------------+
 
 
 
+----------------+-------------+-------------+
| Job Start Date | Occupation  | Industry    |
+----------------+-------------+-------------+
| Not on file    | Not on file | Not on file |
+----------------+-------------+-------------+
 
 
 
+----------------+--------------+------------+
| Travel History | Travel Start | Travel End |
+----------------+--------------+------------+
 
 
 
+-------------------------------------+
| No recent travel history available. |
+-------------------------------------+
 documented as of this encounter
 
 Last Filed Vital Signs
 
 
+-------------------+-------------------+----------------------+-----------------+
| Vital Sign        | Reading           | Time Taken           | Comments        |
+-------------------+-------------------+----------------------+-----------------+
| Blood Pressure    | 110/70            | 2013 10:22 AM  |                 |
|                   |                   | PDT                  |                 |
+-------------------+-------------------+----------------------+-----------------+
| Pulse             | 79                | 2013 10:22 AM  |                 |
|                   |                   | PDT                  |                 |
+-------------------+-------------------+----------------------+-----------------+
| Temperature       | -                 | -                    |                 |
+-------------------+-------------------+----------------------+-----------------+
| Respiratory Rate  | -                 | -                    |                 |
+-------------------+-------------------+----------------------+-----------------+
 
| Oxygen Saturation | 98%               | 2013 10:22 AM  | O2 ranged from  |
|                   |                   | PDT                  | 95-98%          |
+-------------------+-------------------+----------------------+-----------------+
| Inhaled Oxygen    | -                 | -                    |                 |
| Concentration     |                   |                      |                 |
+-------------------+-------------------+----------------------+-----------------+
| Weight            | 134.3 kg (296 lb) | 2013 10:22 AM  |                 |
|                   |                   | PDT                  |                 |
+-------------------+-------------------+----------------------+-----------------+
| Height            | 165.1 cm (5' 5")  | 2013 10:22 AM  |                 |
|                   |                   | PDT                  |                 |
+-------------------+-------------------+----------------------+-----------------+
| Body Mass Index   | 49.26             | 2013 10:22 AM  |                 |
|                   |                   | PDT                  |                 |
+-------------------+-------------------+----------------------+-----------------+
 documented in this encounter
 
 Patient Instructions
 Patient Instructions Nena Boykin MD - 2013 11:13 AM PDTStart inhaler and n
keren spray. Rinse mouth after using inhaler. 
 
 Take CPAP in to have pressure changed. Electronically signed by Nena Boykin MD at
 2013  6:27 AM PDT
 documented in this encounter
 
 Progress Notes
 Nena Boykin MD - 2013 10:43 AM PDTFormatting of this note might be differe
nt from the original.
 Pulmonary Follow Up
 
 HPI 
  
 Viviana Ricci is a 67 y.o. female patient of Marzena Moser here today for follow up of 
cough and shortness of breath.
 
 She saw Dr. Moser and he put her on prednisone. She notes that she felt much better on i
t, she had no cough and wheezing. She stopped the prednisone about 2 weeks ago (is back to h
er baseline 5mg a day that she takes for her transplant). She notes the shortness of breath 
did not get better on the prednisone, but the wheezing and cough did. She thinks the shortne
ss of breath may be due to a different process than the wheezing and cough. 
 
 She wonders if she has allergies as she had some stuff in her throat and was clearing throa
t and was bringing up some mucous.  She does not notice nasal congestion, but does notice dr do. She has noticed some wheezing again, at night. 
 
 She had her methacholine today, and it was negative. We discussed starting her on treatment
 for asthma anyway, and she is agreeable. She is using the oxygen with her CPAP, and her netta
nload looked good, except that her compliance was poor. 
 
 She had an echocardiogram done, without major abnormalities. They could not assess her pulm
onary artery pressures. 
 
 Her last stress test was prior to .
 
 Past Medical History
 Past Medical History 
 Diagnosis Date 
   Kidney replaced by transplant  
   Complication of transplanted kidney  
   Coronary artery disease  
 
   s/p CABG , cardiac cath in  
   Pulmonary hypertension  
   thought secondary to nocturnal hypoxemia 
   Obesity hypoventilation syndrome  
   JACK on CPAP  
   Diabetes mellitus, type 2  
   Secondary hyperparathyroidism  
   Hypothyroidism  
   Hyperlipidemia  
   Chronic low back pain  
   Movement disorder  
   tremor of unclear etiology 
   DJD (degenerative joint disease)  
   s/p knee replacement 
   Humerus fracture 2003 
   right supracondylar distal humerus fracture 
   Carpal tunnel syndrome  
   Anemia in chronic renal disease  
   resolved after transplant 
   Infection with CMV (cytomegalovirus)  
   complicating kidney transplant 
   FSGS (focal segmental glomerulosclerosis)  
   ESRD (end stage renal disease)  
  
  
 Past Surgical History
 Past Surgical History 
 Procedure Date 
   Cholecystectomy  
   Insert peritoneal catheter 1998 
   x 2 
   Kidney transplant  
   Total knee arthroplasty  
   Right 
   Hysterectomy 2003 
   Abdominal exploration surgery 2006 
   Parathyroidectomy 2007 
   Carpal tunnel release  
   Coronary artery bypass graft 1998 
   5v 
  
 
 Social History: 
 History 
 
 Social History 
   Marital Status: Single 
   Spouse Name: N/A 
   Number of Children: N/A 
   Years of Education: N/A 
 
 Occupational History 
   .  Disabled 
 
 Social History Main Topics 
   Smoking status: Never Smoker  
   Smokeless tobacco: Never Used 
  Comment: some second hand smoke exposure, but fairly minimal 
   Alcohol Use: No 
   Drug Use: No 
 
   Sexually Active: None 
 
 Other Topics Concern 
   None 
 
 Social History Narrative 
  Lives in [place] alone. Has a dog at home. No other animal exposures. Had birds as a chi
ld. Grew up in [place], [place]. 
 
 Allergies:
 No Known Allergies  
 
 Medications:
 Outpatient Encounter Prescriptions as of 2013 
 Medication Sig Dispense Refill 
   albuterol (PROAIR HFA) 90 mcg/puff inhaler Inhale 2 puffs into the lungs every 6 hours 
as needed for Wheezing.  1 Inhaler  2 
   allopurinol (ZYLOPRIM) 100 mg tablet Take 100 mg by mouth Daily.     
   aspirin 81 MG EC tablet Take 81 mg by mouth Daily.     
   Cholecalciferol (VITAMIN D3) 5000 UNITS CAPS Take 5,000 Units by mouth Once a week.    
 
   cinacalcet (SENSIPAR) 30 mg tablet Take 1 tablet by mouth Daily.  30 tablet  12 
   fludrocortisone (FLORINEF) 0.1 mg tablet Take 1 tablet by mouth Every other day.  30 ta
blet  11 
   furosemide (LASIX) 40 mg tablet Take two tablets by mouth daily.  60 tablet  5 
   glucose blood VI test strips (ONE TOUCH ULTRA TEST) strip Check blood sugar before each
 meal and as directed  100 each  12 
   insulin glargine (LANTUS) 100 units/mL injection Inject 20 Units under the skin every m
orning.       
   insulin lispro (HUMALOG) 100 units/mL injection Inject subcutaneously before meals acco
rding to sliding scale  10 pen  5 
   Insulin Syringe-Needle U-100 (BD INSULIN SYRINGE ULTRAFINE) 31G X 5/16" 0.5 ML MISC Use
 before meals and as directed.  100 each  11 
   levothyroxine (LEVOTHROID) 50 mcg tablet Take 1 tablet by mouth Daily.  30 tablet  11 
   lisinopril (PRINIVIL,ZESTRIL) 30 MG tablet Take 1 tablet by mouth Daily.  90 tablet  3 
   loperamide (ANTI-DIARRHEAL) 2 mg capsule Take 2 mg by mouth Daily as needed.     
   magnesium oxide (MAG-OX) 400 mg tablet Take 1 tablet by mouth 2 times daily.  62 tablet
  12 
   metoprolol tartrate (LOPRESSOR) 25 mg tablet Take 1 tablet by mouth 2 times daily.  60 
tablet  11 
   mycophenolate (CELLCEPT) 250 mg capsule Take 250 mg by mouth 3 times daily.     
   omeprazole (PRILOSEC) 20 mg capsule Take one capsule by mouth once daily on an empty st
omach     
   predniSONE (DELTASONE) 10 mg tablet Take  by mouth See Admin Instructions. Take 40 mg b
y mouth daily for five days, then decrease to 30 mg for two days, then decrease to 20 mg for
 two days, then decrease to 10 mg for two days, then discontinue and resume daily dose of 5 
mg daily.     
   predniSONE (DELTASONE) 5 mg tablet Take 1 tablet by mouth Daily.  90 tablet  3 
   Prenatal Multivit-Min-Fe-FA (PRENATAL VITAMINS) 0.8 MG TABS Take 0.8 mg by mouth Daily.
  30 each  11 
   rosuvastatin (CRESTOR) 40 MG tablet Take 40 mg by mouth nightly.       
   tacrolimus (PROGRAF) 0.5 mg capsule Take 0.5 mg by mouth 2 times daily.     
   tacrolimus (PROGRAF) 1 mg capsule Take 1 capsule by mouth 2 times daily.  62 capsule  1
2 
   valsartan (DIOVAN) 160 mg tablet Take 1 tablet by mouth Daily.  30 tablet  11 
 
 Review of Systems
 Constitutional:  Denies fever, chills, sweats, and change in weight. 
 Sleep:  Using CPAP without issue. Started O2 bleed in.  
 GI:  No heartburn or reflux.  
 
 ENT:  See HPI. 
 Resp:   See HPI.  
 CV:  Denies chest pain, palpitations, syncope, and peripheral edema. 
 Neuro:  Has had some vertigo when making her bed. She describes this as the room spinning, 
she takes a break and this improves.  
 
 Objective 
 
 /70 | Pulse 79 | Ht 1.651 m (5' 5") | Wt 134.265 kg (296 lb) | BMI 49.26 kg/m2 | SpO2
 98%RA
 
 General Appearance:  Alert, cooperative, no distress, appears stated age, having frequent t
remors 
 Mouth: No oral lesions or exudate 
 Neck: Supple, symmetrical, no adenopathy 
 Lungs:   No accessory muscle use, breath sounds are slightly diminished bilaterally, no whe
ezes, crackles or rhonchi 
 Chest Wall:  No deformity 
 Heart:  Regular rate and rhythm, no murmur, rub or gallop 
 Abdomen:   Soft, non-tender, non-distended, obese 
 Extremities:  No cyanosis, clubbing, some pitting edema of bilateral lower extremities 
 
 Data:
 Methacholine challenge test was performed prior to clinic today and was reviewed and interp
reted in clinic today.  It was negative.
 
 Dates: 3/26/13-13
 Machine type: Gucash
 Home Health Company: [facility]
 CPAP Pressure: 14-20 cmH2O
 Median Titrated Pressure: 14.2 cmH2O
 95%tile Pressure: 15.4 cmH2O
 Maximum Pressure: 16.0 cmH2O
 AHI: 2.1 events/hour
 Total number of  days: 30 
 Number of days used: 20
 Median daily usage: 4:01 hours
 Percent of days used for more than 4 hours: 33 %
 Median leak: 9.0 L/min
 
 Echocardiogram was done on 2013 and showed good LV function, but some evidence of
 elevated left heart filling pressures. They could not assess pulmonary pressures.
 
 Overnight oximetry was done on May 17, 2013 and did show that she desaturated on her CPAP a
lone, and a 2LPM bleed in was added. 
 
 Chest x-ray done May 17, 2013 was reviewed in clinic today. It shows cardiomegaly, previous
 sternotomy, prominent pulmonary vessels and no acute findings. 
 
  Ref. Range 2013 10:01 
 BNP Latest Range: <100 pg/mL 164 (H) 
 
 Immunization History 
 Administered Date(s) Administered 
   INFLUENZA, PRESERVATIVE FREE IM 2012 
 
 Assessment  
 
 1. Dyspnea - Etiology is not readily apparent, and may have more than one etiology. My susp
icion is for a combination of worsening pulmonary hypertension from low oxygen at night from
 
 lack of CPAP usage and from progressive nocturnal hypoxemia. This however, could not be wel
l assessed on echo due to body habitus. We have added a oxygen bleed in and I have encourage
d CPAP usage. She also has signs of hypervolemia on eco, likely from kidney transplant dysfu
nction, as her LV function appeared fairly good. I also think obesity and deconditioning saul
ys a role here, and this has worsened with progressive fatigue off her CPAP. In addition, th
ere is the question of asthma. Her methacholine is negative. There are some patients who hakeem
l not have a positive methacholine, so we will trial a course of inhaled steroids.
 She is not sure that her dyspnea improved with steroids and does not feel it responds to al
buterol. The wheezing and cough were better on prednisone. This makes me think she has more 
than one problem as cause for her symptoms. 
 Other considerations include cardiac disease, pulmonary parenchymal disease. We will need t
o consider a repeat stress test or chest CT if no ongoing improvement.  
 2. Cough - I do suspect that this is at least in part allergic rhinitis. The question is if
 there is also a component of asthma. We will treat for both.  
 3. Allergic rhinitis - I will treat for both with nasal steroids. We can then try adding an
 antihistamine.  
 4. Pulmonary hypertension - She has documented pulmonary hypertension. Repeat echo was unab
le to assess current status. Theoretically it ought to be better, but given that she has had
 poor CPAP compliance and is more hypervolemic, it could very well be worse. Dr. Pham previo
usly did a fairly extensive work up, only lacking vasodilator trial (which he had wanted), a
nd possibly autoimmune serologies (which I did not see in his notes), but it appeared fairly
 conclusively to be from low oxygen at night at the time. If further evaluation is needed to
 see her current status, we will have to do a right heart catheterization for evaluation.  
 5. Hypervolemia - She has evidence of hypervolemia on echocardiogram. Given her transplant 
status I will defer management to her nephrologist. Her BNP has been only slightly elevated,
 which is interesting. I do think this is playing some role in her dyspnea, but obviously th
e data is somewhat conflicting and somewhat difficult to interpret.  
 6. Obesity hypoventilation syndrome - We have added an oxygen bleed in to her CPAP. Given h
er symptoms of dizziness on exertion, I will also check an ambulating oximetry today.  
 7. JACK on CPAP - She has had poor compliance, which will lead to fatigue, increasing pulmon
manisha pressures, and thus increased dyspnea. I am not certain how much of a role this is playi
ng in her symptoms, but I suspect at least some. I have encouraged good usage. If further do
wnloads do not show improved compliance, then we will refer to Yaakov Pepper for follow up.  
 8. Hypothyroidism - Elevation in TSH could result in fatigue, thus we will check a TSH if t
here has not been one done recently at [facility].  
 
 Plan 
 1.CPAP compliance encouraged.
 2.Start flunisolide nasal spray 2 sprays daily.
 3.Start Flovent 220mcg 1 puff inhaled twice a day.
 4.2 liter bleed in added to CPAP.
 5.I am dropping her CPAP pressure to [id number] based on her download.
 6.Ambulating oximetry today.
 7.Consider a chest CT and stress test.
 8.Consider a repeat right heart catheterization to assess her pulmonary pressures. 
 9.I leave management of her volume status to her nephrologist.
 
 30 minutes were spent with Ms. Ricci with greater than 50% spent in counseling and coordina
tion of care.
 
 She was advised to call if new pulmonary symptoms were to develop.
 
 Return to clinic in 6 weeks, or sooner with concerns.
 
 CC: Marzena Moser DO
 
 Portions of this report were transcribed using voice recognition software.  Every effort wa
s made to ensure accuracy; however, inadvertent computerized transcription errors may be pre
sent.
 
 
 Electronically signed by Nena Boykin MD at 2013  6:29 AM PDTdocumented in t
his encounter
 
 Plan of Treatment
 
 
+--------+-----------+------------+----------------------+-------------------+
| Date   | Type      | Specialty  | Care Team            | Description       |
+--------+-----------+------------+----------------------+-------------------+
| / | Office    | Cardiology |   Tonia Wilson,    |                   |
|    | Visit     |            | ARN  [address]   |                   |
|        |           |            | [place], [place]  |                   |
|        |           |            | [address] [phone]  |                   |
|        |           |            |  [phone] (Fax)  |                   |
+--------+-----------+------------+----------------------+-------------------+
| / | Hospital  | Radiology  |   Popeye Townsend, |                   |
|    | Encounter |            |  MD  [address] |                   |
|        |           |            |  St.  [place],   |                   |
|        |           |            | [place] [address]             |                   |
|        |           |            | [phone]         |                   |
|        |           |            | [phone] (Fax)   |                   |
+--------+-----------+------------+----------------------+-------------------+
| / | Surgery   | Radiology  |   Popeye Townsend, | CV EP PPM SYSTEM  |
|    |           |            |  MD  [address] | IMPLANT           |
|        |           |            |  St.  [place],   |                   |
|        |           |            | [place] [address]             |                   |
|        |           |            | [phone]         |                   |
|        |           |            | [phone] (Fax)   |                   |
+--------+-----------+------------+----------------------+-------------------+
| 02/10/ | Clinical  | Cardiology |                      |                   |
|    | Support   |            |                      |                   |
+--------+-----------+------------+----------------------+-------------------+
| / | Office    | Cardiology |   Tonia Wilson,    |                   |
|    | Visit     |            | ARNP  [address]   |                   |
|        |           |            | St  [place], [place]  |                   |
|        |           |            | [address]  [phone]  |                   |
|        |           |            |  [phone] (Fax)  |                   |
+--------+-----------+------------+----------------------+-------------------+
| / | Off-Site  | Nephrology |   Marzena Moser  |                   |
|    | Visit     |            | M,   [address]      |                   |
|        |           |            | [address]      |                   |
|        |           |            | [place]      |                   |
|        |           |            | [address]  [phone]  |                   |
|        |           |            |  [phone] (Fax)  |                   |
+--------+-----------+------------+----------------------+-------------------+
 
 
 
+-----------------+-------------+--------+----------------------+----------------------+
| Name            | Type        | Priori | Associated Diagnoses | Order Schedule       |
|                 |             | ty     |                      |                      |
+-----------------+-------------+--------+----------------------+----------------------+
| Pulse oximetry  | Respiratory | Routin |   Dyspnea            | Expected:            |
| titation        |  Care       | e      |                      | 2013, Expires: |
|                 |             |        |                      |  2014          |
+-----------------+-------------+--------+----------------------+----------------------+
 documented as of this encounter
 
 Visit Diagnoses
 
 
 
+----------------------------------------------------------------------------+
| Diagnosis                                                                  |
+----------------------------------------------------------------------------+
|   Dyspnea - Primary  Other dyspnea and respiratory abnormality             |
+----------------------------------------------------------------------------+
|   Cough                                                                    |
+----------------------------------------------------------------------------+
|   Allergic rhinitis  Allergic rhinitis, cause unspecified                  |
+----------------------------------------------------------------------------+
|   Pulmonary hypertension (HCC)  Other chronic pulmonary heart diseases     |
+----------------------------------------------------------------------------+
|   Hypervolemia  Other fluid overload                                       |
+----------------------------------------------------------------------------+
|   Obesity hypoventilation syndrome (HCC)  Obesity hypoventilation syndrome |
+----------------------------------------------------------------------------+
|   JACK on CPAP  Obstructive sleep apnea (adult) (pediatric)                 |
+----------------------------------------------------------------------------+
|   Hypothyroidism  Unspecified hypothyroidism                               |
+----------------------------------------------------------------------------+
 documented in this encounter

## 2020-01-08 NOTE — XMS
Encounter Summary
  Created on: 2020
 
 Viviana Ricci
 External Reference #: [id number]
 : 47
 Sex: Female
 
 Demographics
 
 
+-----------------------+----------------------+
| Address               | [address]      |
|                       | [place]  [address] |
+-----------------------+----------------------+
| Home Phone            | [phone]      |
+-----------------------+----------------------+
| Preferred Language    | Unknown              |
+-----------------------+----------------------+
| Marital Status        | Single               |
+-----------------------+----------------------+
| Zoroastrian Affiliation | [id number]                 |
+-----------------------+----------------------+
| Race                  | Unknown              |
+-----------------------+----------------------+
| Ethnic Group          | Unknown              |
+-----------------------+----------------------+
 
 
 Author
 
 
+--------------+--------------------------------------------+
| Author       | [facility]  |
|              | and [facility]                                |
+--------------+--------------------------------------------+
| Organization | [facility]  |
|              | and [facility]                                |
+--------------+--------------------------------------------+
| Address      | Unknown                                    |
+--------------+--------------------------------------------+
| Phone        | Unavailable                                |
+--------------+--------------------------------------------+
 
 
 
 Support
 
 
+----------------+--------------+---------------------+-----------------+
| Name           | Relationship | Address             | Phone           |
+----------------+--------------+---------------------+-----------------+
| Ada/Ed Radhames | ECON         | [place]              | [phone] |
|                |              | [place]  |                 |
|                |              |  [id number]              |                 |
+----------------+--------------+---------------------+-----------------+
 
 
 
 
 Care Team Providers
 
 
+-----------------------+------+-------------+
| Care Team Member Name | Role | Phone       |
+-----------------------+------+-------------+
 PCP  | Unavailable |
+-----------------------+------+-------------+
 
 
 
 Reason for Visit
 
 
+-------------------+----------+
| Reason            | Comments |
+-------------------+----------+
| Medication Refill |          |
+-------------------+----------+
 
 
 
 Encounter Details
 
 
+--------+--------+---------------------+----------------------+-------------------+
| Date   | Type   | Department          | Care Team            | Description       |
+--------+--------+---------------------+----------------------+-------------------+
| / | Refill |   [facility]         |   Marzena Moser  | Medication Refill |
|    |        | NEPHROLOGY  [address]   | M, DO  [address]      |                   |
|        |        | [address]   | [address]      |                   |
|        |        | [place], [place]     | [place], [place]      |                   |
|        |        | [address]          | [address]  [phone]  |                   |
|        |        | [phone]        |  [phone] (Fax)  |                   |
+--------+--------+---------------------+----------------------+-------------------+
 
 
 
 Social History
 
 
+--------------+-------+-----------+--------+------+
| Tobacco Use  | Types | Packs/Day | Years  | Date |
|              |       |           | Used   |      |
+--------------+-------+-----------+--------+------+
| Never Smoker |       |           |        |      |
+--------------+-------+-----------+--------+------+
 
 
 
+---------------------+---+---+---+
| Smokeless Tobacco:  |   |   |   |
| Never Used          |   |   |   |
+---------------------+---+---+---+
 
 
 
+---------------------------------------------------------------+
| Comments: some second hand smoke exposure, but fairly minimal |
 
+---------------------------------------------------------------+
 
 
 
+-------------+-------------+---------+----------+
| Alcohol Use | Drinks/Week | oz/Week | Comments |
+-------------+-------------+---------+----------+
| No          |             |         |          |
+-------------+-------------+---------+----------+
 
 
 
+------------------+---------------+
| Sex Assigned at  | Date Recorded |
| Birth            |               |
+------------------+---------------+
| Not on file      |               |
+------------------+---------------+
 
 
 
+----------------+-------------+-------------+
| Job Start Date | Occupation  | Industry    |
+----------------+-------------+-------------+
| Not on file    | Not on file | Not on file |
+----------------+-------------+-------------+
 
 
 
+----------------+--------------+------------+
| Travel History | Travel Start | Travel End |
+----------------+--------------+------------+
 
 
 
+-------------------------------------+
| No recent travel history available. |
+-------------------------------------+
 documented as of this encounter
 
 Plan of Treatment
 
 
+--------+-----------+------------+----------------------+-------------------+
| Date   | Type      | Specialty  | Care Team            | Description       |
+--------+-----------+------------+----------------------+-------------------+
| / | Office    | Cardiology |   HellalfredoTonia,    |                   |
|    | Visit     |            | ARNP  [address]   |                   |
|        |           |            | St  [place], [place]  |                   |
|        |           |            | [address]  [phone]  |                   |
|        |           |            |  [phone] (Fax)  |                   |
+--------+-----------+------------+----------------------+-------------------+
| / | Hospital  | Radiology  |   Popeye Townsend, |                   |
|    | Encounter |            |  MD  [address] |                   |
|        |           |            |  St.  [place],   |                   |
|        |           |            | [place] [address]             |                   |
|        |           |            | [phone]         |                   |
|        |           |            | [phone] (Fax)   |                   |
+--------+-----------+------------+----------------------+-------------------+
| / | Surgery   | Radiology  |   Popeye Townsend, | CV EP PPM SYSTEM  |
 
|    |           |            |  MD  [address] | IMPLANT           |
|        |           |            |  St.  [place],   |                   |
|        |           |            | [place] [address]             |                   |
|        |           |            | [phone]         |                   |
|        |           |            | [phone] (Fax)   |                   |
+--------+-----------+------------+----------------------+-------------------+
| 02/10/ | Clinical  | Cardiology |                      |                   |
|    | Support   |            |                      |                   |
+--------+-----------+------------+----------------------+-------------------+
| / | Office    | Cardiology |   Tonia Wilson,    |                   |
|    | Visit     |            | ARNP  [address]   |                   |
|        |           |            | [place]  |                   |
|        |           |            | [address]  [phone]  |                   |
|        |           |            |  [phone] (Fax)  |                   |
+--------+-----------+------------+----------------------+-------------------+
| / | Off-Site  | Nephrology |   Marzena Moser  |                   |
|    | Visit     |            | DO ETIENNE  [address] [place] [address]      |                   |
|        |           |            | [address]      |                   |
|        |           |            | [place]      |                   |
|        |           |            | [address]  [phone]  |                   |
|        |           |            |  [phone] (Fax)  |                   |
+--------+-----------+------------+----------------------+-------------------+
 documented as of this encounter
 
 Visit Diagnoses
 
 
+----------------------------------------------------------------------------------------+
| Diagnosis                                                                              |
+----------------------------------------------------------------------------------------+
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
| left (HCC) - Primary                                                                   |
+----------------------------------------------------------------------------------------+
 documented in this encounter

## 2020-01-08 NOTE — XMS
Encounter Summary
  Created on: 2020
 
 Viviana Ricci
 External Reference #: [id number]
 : 47
 Sex: Female
 
 Demographics
 
 
+-----------------------+----------------------+
| Address               | [address]      |
|                       | [place]  [address] |
+-----------------------+----------------------+
| Home Phone            | [phone]      |
+-----------------------+----------------------+
| Preferred Language    | Unknown              |
+-----------------------+----------------------+
| Marital Status        | Single               |
+-----------------------+----------------------+
| Denominational Affiliation | [id number]                 |
+-----------------------+----------------------+
| Race                  | Unknown              |
+-----------------------+----------------------+
| Ethnic Group          | Unknown              |
+-----------------------+----------------------+
 
 
 Author
 
 
+--------------+--------------------------------------------+
| Author       | [facility]  |
|              | and [place]                                |
+--------------+--------------------------------------------+
| Organization | [facility]  |
|              | and [facility]                                |
+--------------+--------------------------------------------+
| Address      | Unknown                                    |
+--------------+--------------------------------------------+
| Phone        | Unavailable                                |
+--------------+--------------------------------------------+
 
 
 
 Support
 
 
+----------------+--------------+---------------------+-----------------+
| Name           | Relationship | Address             | Phone           |
+----------------+--------------+---------------------+-----------------+
| Ada/Ed Radhames | ECON         | [place]              | [phone] |
|                |              | [place]  |                 |
|                |              |  [address]              |                 |
+----------------+--------------+---------------------+-----------------+
 
 
 
 
 Care Team Providers
 
 
+-----------------------+------+-------------+
| Care Team Member Name | Role | Phone       |
+-----------------------+------+-------------+
 PCP  | Unavailable |
+-----------------------+------+-------------+
 
 
 
 Reason for Visit
 
 
+-------------------+----------+
| Reason            | Comments |
+-------------------+----------+
| Medication Refill |          |
+-------------------+----------+
 
 
 
 Encounter Details
 
 
+--------+--------+---------------------+----------------------+-------------------+
| Date   | Type   | Department          | Care Team            | Description       |
+--------+--------+---------------------+----------------------+-------------------+
| 10/28/ | Refill |   [facility]         |   Marzena Moser  | Medication Refill |
|    |        | NEPHROLOGY  [address]   | M, DO  [address]      |                   |
|        |        | [address]   | [address]      |                   |
|        |        | [place], [place]     | [place], [place]      |                   |
|        |        | [address]          | [address]  [phone]  |                   |
|        |        | [phone]        |  [phone] (Fax)  |                   |
+--------+--------+---------------------+----------------------+-------------------+
 
 
 
 Social History
 
 
+--------------+-------+-----------+--------+------+
| Tobacco Use  | Types | Packs/Day | Years  | Date |
|              |       |           | Used   |      |
+--------------+-------+-----------+--------+------+
| Never Smoker |       |           |        |      |
+--------------+-------+-----------+--------+------+
 
 
 
+---------------------+---+---+---+
| Smokeless Tobacco:  |   |   |   |
| Never Used          |   |   |   |
+---------------------+---+---+---+
 
 
 
+---------------------------------------------------------------+
| Comments: some second hand smoke exposure, but fairly minimal |
 
+---------------------------------------------------------------+
 
 
 
+-------------+-------------+---------+----------+
| Alcohol Use | Drinks/Week | oz/Week | Comments |
+-------------+-------------+---------+----------+
| No          |             |         |          |
+-------------+-------------+---------+----------+
 
 
 
+------------------+---------------+
| Sex Assigned at  | Date Recorded |
| Birth            |               |
+------------------+---------------+
| Not on file      |               |
+------------------+---------------+
 
 
 
+----------------+-------------+-------------+
| Job Start Date | Occupation  | Industry    |
+----------------+-------------+-------------+
| Not on file    | Not on file | Not on file |
+----------------+-------------+-------------+
 
 
 
+----------------+--------------+------------+
| Travel History | Travel Start | Travel End |
+----------------+--------------+------------+
 
 
 
+-------------------------------------+
| No recent travel history available. |
+-------------------------------------+
 documented as of this encounter
 
 Plan of Treatment
 
 
+--------+-----------+------------+----------------------+-------------------+
| Date   | Type      | Specialty  | Care Team            | Description       |
+--------+-----------+------------+----------------------+-------------------+
| / | Office    | Cardiology |   HellalfredoTonia,    |                   |
|    | Visit     |            | ARNP  [address]   |                   |
|        |           |            | St  [place], [place]  |                   |
|        |           |            | [address]  [phone]  |                   |
|        |           |            |  [phone] (Fax)  |                   |
+--------+-----------+------------+----------------------+-------------------+
| / | Hospital  | Radiology  |   Popeye Townsend, |                   |
|    | Encounter |            |  MD  [address] |                   |
|        |           |            |  St.  [place],   |                   |
|        |           |            | [place] [address]             |                   |
|        |           |            | [phone]         |                   |
|        |           |            | [phone] (Fax)   |                   |
+--------+-----------+------------+----------------------+-------------------+
| / | Surgery   | Radiology  |   Popeye Townsend, | CV EP PPM SYSTEM  |
 
|    |           |            |  MD  [address] | IMPLANT           |
|        |           |            |  St.  [place],   |                   |
|        |           |            | [place] [address]             |                   |
|        |           |            | [phone]         |                   |
|        |           |            | [phone] (Fax)   |                   |
+--------+-----------+------------+----------------------+-------------------+
| 02/10/ | Clinical  | Cardiology |                      |                   |
|    | Support   |            |                      |                   |
+--------+-----------+------------+----------------------+-------------------+
| / | Office    | Cardiology |   Tonia Wilson,    |                   |
|    | Visit     |            | ARNP  [address]   |                   |
|        |           |            | [place]  |                   |
|        |           |            | [address]  [phone]  |                   |
|        |           |            |  [phone] (Fax)  |                   |
+--------+-----------+------------+----------------------+-------------------+
| / | Off-Site  | Nephrology |   Marzena Moser  |                   |
|    | Visit     |            | DO ETIENNE  [address] [place] [address]      |                   |
|        |           |            | [address]      |                   |
|        |           |            | [place]      |                   |
|        |           |            | [address]  [phone]  |                   |
|        |           |            |  [phone] (Fax)  |                   |
+--------+-----------+------------+----------------------+-------------------+
 documented as of this encounter
 
 Visit Diagnoses
 
 
+-------------------------------------------+
| Diagnosis                                 |
+-------------------------------------------+
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
+-------------------------------------------+
 documented in this encounter

## 2020-01-08 NOTE — XMS
Encounter Summary
  Created on: 2020
 
 Viviana Ricci
 External Reference #: [id number]
 : 47
 Sex: Female
 
 Demographics
 
 
+-----------------------+----------------------+
| Address               | [address]      |
|                       | [place]  [address] |
+-----------------------+----------------------+
| Home Phone            | [phone]      |
+-----------------------+----------------------+
| Preferred Language    | Unknown              |
+-----------------------+----------------------+
| Marital Status        | Single               |
+-----------------------+----------------------+
| Protestant Affiliation | 1009                 |
+-----------------------+----------------------+
| Race                  | Unknown              |
+-----------------------+----------------------+
| Ethnic Group          | Unknown              |
+-----------------------+----------------------+
 
 
 Author
 
 
+--------------+--------------------------------------------+
| Author       | [facility]  |
|              | and [place]                                |
+--------------+--------------------------------------------+
| Organization | [facility]  |
|              | and [place]                                |
+--------------+--------------------------------------------+
| Address      | Unknown                                    |
+--------------+--------------------------------------------+
| Phone        | Unavailable                                |
+--------------+--------------------------------------------+
 
 
 
 Support
 
 
+----------------+--------------+---------------------+-----------------+
| Name           | Relationship | Address             | Phone           |
+----------------+--------------+---------------------+-----------------+
| Ada/Ed Radhames | ECON         | [address]              | [phone] |
|                |              | [place]  |                 |
|                |              |  [address]              |                 |
+----------------+--------------+---------------------+-----------------+
 
 
 
 
 Care Team Providers
 
 
+------------------------+------+-----------------+
| Care Team Member Name  | Role | Phone           |
+------------------------+------+-----------------+
| Marzena Moser DO | PCP  | [phone] |
+------------------------+------+-----------------+
 
 
 
 Encounter Details
 
 
+--------+-------------+---------------------+----------------------+---------------------+
| Date   | Type        | Department          | Care Team            | Description         |
+--------+-------------+---------------------+----------------------+---------------------+
| / | Orders Only |   [facility] [place]         |   KimAnu W, | Abnormal mammogram  |
| 2019   |             | NEPHROLOGY  [address]   |  MD  [address]    | of left breast      |
|        |             | [address]   | [address]  [place]       | (Primary Dx)        |
|        |             | [place], [place]     | [place] [address]      |                     |
|        |             | [address]          | [phone]         |                     |
|        |             | [phone]        | [phone] (Fax)   |                     |
+--------+-------------+---------------------+----------------------+---------------------+
 
 
 
 Social History
 
 
+--------------+-------+-----------+--------+------+
| Tobacco Use  | Types | Packs/Day | Years  | Date |
|              |       |           | Used   |      |
+--------------+-------+-----------+--------+------+
| Never Smoker |       |           |        |      |
+--------------+-------+-----------+--------+------+
 
 
 
+---------------------+---+---+---+
| Smokeless Tobacco:  |   |   |   |
| Never Used          |   |   |   |
+---------------------+---+---+---+
 
 
 
+---------------------------------------------------------------+
| Comments: some second hand smoke exposure, but fairly minimal |
+---------------------------------------------------------------+
 
 
 
+-------------+-------------+---------+----------+
| Alcohol Use | Drinks/Week | oz/Week | Comments |
+-------------+-------------+---------+----------+
| No          |             |         |          |
+-------------+-------------+---------+----------+
 
 
 
 
+------------------+---------------+
| Sex Assigned at  | Date Recorded |
| Birth            |               |
+------------------+---------------+
| Not on file      |               |
+------------------+---------------+
 
 
 
+----------------+-------------+-------------+
| Job Start Date | Occupation  | Industry    |
+----------------+-------------+-------------+
| Not on file    | Not on file | Not on file |
+----------------+-------------+-------------+
 
 
 
+----------------+--------------+------------+
| Travel History | Travel Start | Travel End |
+----------------+--------------+------------+
 
 
 
+-------------------------------------+
| No recent travel history available. |
+-------------------------------------+
 documented as of this encounter
 
 Progress Notes
 Bobbi Hanson RN - 2019 10:32 AM PDTPatient scheduled for follow up left breast u
ltrasound d/t abnormal mammo: 2019 [facility] -  notified. Karin gurrola signed by Bobbi Hanson RN at 2019 10:49 AM PDTdocumented in this encounter
 
 Plan of Treatment
 
 
+--------+-----------+------------+----------------------+-------------------+
| Date   | Type      | Specialty  | Care Team            | Description       |
+--------+-----------+------------+----------------------+-------------------+
| / | Office    | Cardiology |   Tonia Wilson,    |                   |
|    | Visit     |            | ARNJESSICA  [address]   |                   |
|        |           |            | St [place], [place]  |                   |
|        |           |            | [address]  [phone]  |                   |
|        |           |            |  [phone] (Fax)  |                   |
+--------+-----------+------------+----------------------+-------------------+
| / | Hospital  | Radiology  |   Popeye Townsend, |                   |
2020   | Encounter |            |  MD  [address] |                   |
|        |           |            |  [place],   |                   |
|        |           |            | [place] [address]             |                   |
|        |           |            | [phone]         |                   |
|        |           |            | [phone] (Fax)   |                   |
+--------+-----------+------------+----------------------+-------------------+
| / | Surgery   | Radiology  |   Popeye Townsend, | CV EP PPM SYSTEM  |
2020   |           |            |  MD  [address] | IMPLANT           |
|        |           |            |  [place],   |                   |
|        |           |            | [place] [address]             |                   |
|        |           |            | [phone]         |                   |
|        |           |            | [phone] (Fax)   |                   |
+--------+-----------+------------+----------------------+-------------------+
 
| 02/10/ | Clinical  | Cardiology |                      |                   |
|    | Support   |            |                      |                   |
+--------+-----------+------------+----------------------+-------------------+
| / | Office    | Cardiology |   Tonia Wilson,    |                   |
|    | Visit     |            | [facility]  [address]   |                   |
|        |           |            | [place]  |                   |
|        |           |            | [address]  [phone]  |                   |
|        |           |            |  [phone] (Fax)  |                   |
+--------+-----------+------------+----------------------+-------------------+
| / | Off-Site  | Nephrology |   Marzena Moser  |                   |
|    | Visit     |            | DO ETIENNE  [address] [place] [address]      |                   |
|        |           |            | [address]      |                   |
|        |           |            | [place]      |                   |
|        |           |            | [address]  [phone]  |                   |
|        |           |            |  [phone] (Fax)  |                   |
+--------+-----------+------------+----------------------+-------------------+
 
 
 
+---------------------+---------+--------+----------------------+----------------------+
| Name                | Type    | Priori | Associated Diagnoses | Order Schedule       |
|                     |         | ty     |                      |                      |
+---------------------+---------+--------+----------------------+----------------------+
| US Breast Complete  | Imaging | Routin |   Abnormal mammogram | Expected:            |
| Left                |         | e      |  of left breast      | 2019, Expires: |
|                     |         |        |                      |  2020          |
+---------------------+---------+--------+----------------------+----------------------+
 documented as of this encounter
 
 Visit Diagnoses
 
 
+-----------------------------------------------+
| Diagnosis                                     |
+-----------------------------------------------+
|   Abnormal mammogram of left breast - Primary |
+-----------------------------------------------+
 documented in this encounter

## 2020-01-08 NOTE — XMS
Encounter Summary
  Created on: 2020
 
 Viviana Ricci
 External Reference #: [id number]
 : 47
 Sex: Female
 
 Demographics
 
 
+-----------------------+----------------------+
| Address               | [address]      |
|                       | [place]  [address] |
+-----------------------+----------------------+
| Home Phone            | [phone]      |
+-----------------------+----------------------+
| Preferred Language    | Unknown              |
+-----------------------+----------------------+
| Marital Status        | Single               |
+-----------------------+----------------------+
| Quaker Affiliation | 1009                 |
+-----------------------+----------------------+
| Race                  | Unknown              |
+-----------------------+----------------------+
| Ethnic Group          | Unknown              |
+-----------------------+----------------------+
 
 
 Author
 
 
+--------------+--------------------------------------------+
| Author       | [facility]  |
|              | and [place]                                |
+--------------+--------------------------------------------+
| Organization | [facility]  |
|              | and [place]                                |
+--------------+--------------------------------------------+
| Address      | Unknown                                    |
+--------------+--------------------------------------------+
| Phone        | Unavailable                                |
+--------------+--------------------------------------------+
 
 
 
 Support
 
 
+----------------+--------------+---------------------+-----------------+
| Name           | Relationship | Address             | Phone           |
+----------------+--------------+---------------------+-----------------+
| Ada/Ed Radhames | ECON         | [address]              | [phone] |
|                |              | [place]  |                 |
|                |              |  [address]              |                 |
+----------------+--------------+---------------------+-----------------+
 
 
 
 
 Care Team Providers
 
 
+-----------------------+------+-------------+
| Care Team Member Name | Role | Phone       |
+-----------------------+------+-------------+
 PCP  | Unavailable |
+-----------------------+------+-------------+
 
 
 
 Reason for Visit
 
 
+-------------------+----------+
| Reason            | Comments |
+-------------------+----------+
| Medication Refill |          |
+-------------------+----------+
 
 
 
 Encounter Details
 
 
+--------+--------+---------------------+----------------------+-------------------+
| Date   | Type   | Department          | Care Team            | Description       |
+--------+--------+---------------------+----------------------+-------------------+
| / | Refill |   [place]         |   Marzena Moser  | Medication Refill |
| 2016   |        | NEPHROLOGY  [address]   | M, DO  [address]      |                   |
|        |        | [address]   | [address]      |                   |
|        |        | [place], [place]     | [place], [place]      |                   |
|        |        | [address]          | [address]  [phone]  |                   |
|        |        | [phone]        |  [phone] (Fax)  |                   |
+--------+--------+---------------------+----------------------+-------------------+
 
 
 
 Social History
 
 
+--------------+-------+-----------+--------+------+
| Tobacco Use  | Types | Packs/Day | Years  | Date |
|              |       |           | Used   |      |
+--------------+-------+-----------+--------+------+
| Never Smoker |       |           |        |      |
+--------------+-------+-----------+--------+------+
 
 
 
+---------------------+---+---+---+
| Smokeless Tobacco:  |   |   |   |
| Never Used          |   |   |   |
+---------------------+---+---+---+
 
 
 
+---------------------------------------------------------------+
| Comments: some second hand smoke exposure, but fairly minimal |
 
+---------------------------------------------------------------+
 
 
 
+-------------+-------------+---------+----------+
| Alcohol Use | Drinks/Week | oz/Week | Comments |
+-------------+-------------+---------+----------+
| No          |             |         |          |
+-------------+-------------+---------+----------+
 
 
 
+------------------+---------------+
| Sex Assigned at  | Date Recorded |
| Birth            |               |
+------------------+---------------+
| Not on file      |               |
+------------------+---------------+
 
 
 
+----------------+-------------+-------------+
| Job Start Date | Occupation  | Industry    |
+----------------+-------------+-------------+
| Not on file    | Not on file | Not on file |
+----------------+-------------+-------------+
 
 
 
+----------------+--------------+------------+
| Travel History | Travel Start | Travel End |
+----------------+--------------+------------+
 
 
 
+-------------------------------------+
| No recent travel history available. |
+-------------------------------------+
 documented as of this encounter
 
 Plan of Treatment
 
 
+--------+-----------+------------+----------------------+-------------------+
| Date   | Type      | Specialty  | Care Team            | Description       |
+--------+-----------+------------+----------------------+-------------------+
| / | Office    | Cardiology |   HellalfredoTonia,    |                   |
|    | Visit     |            | ARNP  [address]   |                   |
|        |           |            | St  [place], [place]  |                   |
|        |           |            | [address]  [phone]  |                   |
|        |           |            |  [phone] (Fax)  |                   |
+--------+-----------+------------+----------------------+-------------------+
| / | Hospital  | Radiology  |   Popeye Townsend, |                   |
|    | Encounter |            |  MD  [address] |                   |
|        |           |            |  St.  [place],   |                   |
|        |           |            | [place] [address]             |                   |
|        |           |            | [phone]         |                   |
|        |           |            | [phone] (Fax)   |                   |
+--------+-----------+------------+----------------------+-------------------+
| / | Surgery   | Radiology  |   Popeye Townsend, | CV EP PPM SYSTEM  |
 
|    |           |            |  MD  [address] | IMPLANT           |
|        |           |            |  St.  [place],   |                   |
|        |           |            | [place] [address]             |                   |
|        |           |            | [phone]         |                   |
|        |           |            | [phone] (Fax)   |                   |
+--------+-----------+------------+----------------------+-------------------+
| 02/10/ | Clinical  | Cardiology |                      |                   |
|    | Support   |            |                      |                   |
+--------+-----------+------------+----------------------+-------------------+
| / | Office    | Cardiology |   Tonia Wilson,    |                   |
|    | Visit     |            | ARNP  [address]   |                   |
|        |           |            | [place]  |                   |
|        |           |            | [address]  [phone]  |                   |
|        |           |            |  [phone] (Fax)  |                   |
+--------+-----------+------------+----------------------+-------------------+
| / | Off-Site  | Nephrology |   Marzena Moser  |                   |
|    | Visit     |            | DO ETIENNE  [address] [place] [address]      |                   |
|        |           |            | [address]      |                   |
|        |           |            | [place]      |                   |
|        |           |            | [address]  [phone]  |                   |
|        |           |            |  [phone] (Fax)  |                   |
+--------+-----------+------------+----------------------+-------------------+
 documented as of this encounter
 
 Visit Diagnoses
 Not on filedocumented in this encounter

## 2020-01-08 NOTE — XMS
Encounter Summary
  Created on: 2020
 
 Viviana Ricci
 External Reference #: [id number]
 : 47
 Sex: Female
 
 Demographics
 
 
+-----------------------+----------------------+
| Address               | [address]      |
|                       | [place]  [address] |
+-----------------------+----------------------+
| Home Phone            | [phone]      |
+-----------------------+----------------------+
| Preferred Language    | Unknown              |
+-----------------------+----------------------+
| Marital Status        | Single               |
+-----------------------+----------------------+
| Holiness Affiliation | [id number]                 |
+-----------------------+----------------------+
| Race                  | Unknown              |
+-----------------------+----------------------+
| Ethnic Group          | Unknown              |
+-----------------------+----------------------+
 
 
 Author
 
 
+--------------+--------------------------------------------+
| Author       | [facility]  |
|              | and [place]                                |
+--------------+--------------------------------------------+
| Organization | [facility]  |
|              | and [place]                                |
+--------------+--------------------------------------------+
| Address      | Unknown                                    |
+--------------+--------------------------------------------+
| Phone        | Unavailable                                |
+--------------+--------------------------------------------+
 
 
 
 Support
 
 
+----------------+--------------+---------------------+-----------------+
| Name           | Relationship | Address             | Phone           |
+----------------+--------------+---------------------+-----------------+
| Ada/Ed Radhames | ECON         | [address]              | [phone] |
|                |              | [place]  |                 |
|                |              |  [address]              |                 |
+----------------+--------------+---------------------+-----------------+
 
 
 
 
 Care Team Providers
 
 
+------------------------+------+-----------------+
| Care Team Member Name  | Role | Phone           |
+------------------------+------+-----------------+
| Marzena Moser DO | PCP  | [phone] |
+------------------------+------+-----------------+
 
 
 
 Encounter Details
 
 
+--------+-------------+---------------------+----------------------+-------------+
| Date   | Type        | Department          | Care Team            | Description |
+--------+-------------+---------------------+----------------------+-------------+
| / | Documentati |   [facility]         |   Marzena Moser  |             |
| 2019   | on          | NEPHROLOGY  [address]   | M, DO  [address]      |             |
|        |             | [address]   | [address]      |             |
|        |             | [place], [place]     | [place], [place]      |             |
|        |             | [address]          | [address]  [phone]  |             |
|        |             | [phone]        |  [phone] (Fax)  |             |
+--------+-------------+---------------------+----------------------+-------------+
 
 
 
 Social History
 
 
+--------------+-------+-----------+--------+------+
| Tobacco Use  | Types | Packs/Day | Years  | Date |
|              |       |           | Used   |      |
+--------------+-------+-----------+--------+------+
| Never Smoker |       |           |        |      |
+--------------+-------+-----------+--------+------+
 
 
 
+---------------------+---+---+---+
| Smokeless Tobacco:  |   |   |   |
| Never Used          |   |   |   |
+---------------------+---+---+---+
 
 
 
+---------------------------------------------------------------+
| Comments: some second hand smoke exposure, but fairly minimal |
+---------------------------------------------------------------+
 
 
 
+-------------+-------------+---------+----------+
| Alcohol Use | Drinks/Week | oz/Week | Comments |
+-------------+-------------+---------+----------+
| No          |             |         |          |
+-------------+-------------+---------+----------+
 
 
 
 
+------------------+---------------+
| Sex Assigned at  | Date Recorded |
| Birth            |               |
+------------------+---------------+
| Not on file      |               |
+------------------+---------------+
 
 
 
+----------------+-------------+-------------+
| Job Start Date | Occupation  | Industry    |
+----------------+-------------+-------------+
| Not on file    | Not on file | Not on file |
+----------------+-------------+-------------+
 
 
 
+----------------+--------------+------------+
| Travel History | Travel Start | Travel End |
+----------------+--------------+------------+
 
 
 
+-------------------------------------+
| No recent travel history available. |
+-------------------------------------+
 documented as of this encounter
 
 Progress Maite Perez - 2019  9:04 AM PDTMammogram report, dos 19 from [facility].  Sent to scan.Electronically signed by Maite Raygoza at 2019  9:06 AM PDTdoc
umented in this encounter
 
 Plan of Treatment
 
 
+--------+-----------+------------+----------------------+-------------------+
| Date   | Type      | Specialty  | Care Team            | Description       |
+--------+-----------+------------+----------------------+-------------------+
| / | Office    | Cardiology |   Tonia Wilson,    |                   |
|    | Visit     |            | ARNJESSICA  [address]   |                   |
|        |           |            | St  [facility], [place]  |                   |
|        |           |            | [address]  [phone]  |                   |
|        |           |            |  [phone] (Fax)  |                   |
+--------+-----------+------------+----------------------+-------------------+
| / | Hospital  | Radiology  |   Popeye Townsend, |                   |
|    | Encounter |            |  MD  [address] |                   |
|        |           |            |  [place],   |                   |
|        |           |            | [place] [address]             |                   |
|        |           |            | [phone]         |                   |
|        |           |            | [phone] (Fax)   |                   |
+--------+-----------+------------+----------------------+-------------------+
| / | Surgery   | Radiology  |   Popeye Townsend, | CV EP PPM SYSTEM  |
|    |           |            |  MD  [address] | IMPLANT           |
|        |           |            |  [place],   |                   |
|        |           |            | [place] [address]             |                   |
|        |           |            | [phone]         |                   |
|        |           |            | [phone] (Fax)   |                   |
+--------+-----------+------------+----------------------+-------------------+
 
| 02/10/ | Clinical  | Cardiology |                      |                   |
|    | Support   |            |                      |                   |
+--------+-----------+------------+----------------------+-------------------+
| / | Office    | Cardiology |   Tonia Wilson,    |                   |
|    | Visit     |            | BELKIS  [address]   |                   |
|        |           |            | [place]  |                   |
|        |           |            | [address]  [phone]  |                   |
|        |           |            |  [phone] (Fax)  |                   |
+--------+-----------+------------+----------------------+-------------------+
| / | Off-Site  | Nephrology |   Marzena Moser  |                   |
|    | Visit     |            | DO ETIENNE  [address] [place] [address]      |                   |
|        |           |            | [address]      |                   |
|        |           |            | [place]      |                   |
|        |           |            | [address] [phone]  |                   |
|        |           |            |  [phone] (Fax)  |                   |
+--------+-----------+------------+----------------------+-------------------+
 documented as of this encounter
 
 Visit Diagnoses
 Not on filedocumented in this encounter

## 2020-01-08 NOTE — XMS
Encounter Summary
  Created on: 2020
 
 Viviana Ricci
 External Reference #: [id number]
 : 47
 Sex: Female
 
 Demographics
 
 
+-----------------------+----------------------+
| Address               | [address]      |
|                       | [place]  [address] |
+-----------------------+----------------------+
| Home Phone            | [phone]      |
+-----------------------+----------------------+
| Preferred Language    | Unknown              |
+-----------------------+----------------------+
| Marital Status        | Single               |
+-----------------------+----------------------+
| Tenriism Affiliation | [id number]                 |
+-----------------------+----------------------+
| Race                  | Unknown              |
+-----------------------+----------------------+
| Ethnic Group          | Unknown              |
+-----------------------+----------------------+
 
 
 Author
 
 
+--------------+--------------------------------------------+
| Author       | [facility]  |
|              | and [place]                                |
+--------------+--------------------------------------------+
| Organization | [facility]  |
|              | and [place]                                |
+--------------+--------------------------------------------+
| Address      | Unknown                                    |
+--------------+--------------------------------------------+
| Phone        | Unavailable                                |
+--------------+--------------------------------------------+
 
 
 
 Support
 
 
+----------------+--------------+---------------------+-----------------+
| Name           | Relationship | Address             | Phone           |
+----------------+--------------+---------------------+-----------------+
| Ada/Ed Radhames | ECON         | [address]              | [phone] |
|                |              | [place]  |                 |
|                |              |  [address]              |                 |
+----------------+--------------+---------------------+-----------------+
 
 
 
 
 Care Team Providers
 
 
+-----------------------+------+-------------+
| Care Team Member Name | Role | Phone       |
+-----------------------+------+-------------+
 PCP  | Unavailable |
+-----------------------+------+-------------+
 
 
 
 Reason for Visit
 
 
+-------------------+----------+
| Reason            | Comments |
+-------------------+----------+
| Medication Refill |          |
+-------------------+----------+
 
 
 
 Encounter Details
 
 
+--------+--------+---------------------+----------------------+-------------------+
| Date   | Type   | Department          | Care Team            | Description       |
+--------+--------+---------------------+----------------------+-------------------+
| / | Refill |   [place]         |   Marzena Moser  | Medication Refill |
| 2016   |        | NEPHROLOGY  [address]   | M, DO  [address]      |                   |
|        |        | [address]   | [address]      |                   |
|        |        | [place], [place]     | [place], [place]      |                   |
|        |        | [address]          | [address]  [phone]  |                   |
|        |        | [phone]        |  [phone] (Fax)  |                   |
+--------+--------+---------------------+----------------------+-------------------+
 
 
 
 Social History
 
 
+--------------+-------+-----------+--------+------+
| Tobacco Use  | Types | Packs/Day | Years  | Date |
|              |       |           | Used   |      |
+--------------+-------+-----------+--------+------+
| Never Smoker |       |           |        |      |
+--------------+-------+-----------+--------+------+
 
 
 
+---------------------+---+---+---+
| Smokeless Tobacco:  |   |   |   |
| Never Used          |   |   |   |
+---------------------+---+---+---+
 
 
 
+---------------------------------------------------------------+
| Comments: some second hand smoke exposure, but fairly minimal |
 
+---------------------------------------------------------------+
 
 
 
+-------------+-------------+---------+----------+
| Alcohol Use | Drinks/Week | oz/Week | Comments |
+-------------+-------------+---------+----------+
| No          |             |         |          |
+-------------+-------------+---------+----------+
 
 
 
+------------------+---------------+
| Sex Assigned at  | Date Recorded |
| Birth            |               |
+------------------+---------------+
| Not on file      |               |
+------------------+---------------+
 
 
 
+----------------+-------------+-------------+
| Job Start Date | Occupation  | Industry    |
+----------------+-------------+-------------+
| Not on file    | Not on file | Not on file |
+----------------+-------------+-------------+
 
 
 
+----------------+--------------+------------+
| Travel History | Travel Start | Travel End |
+----------------+--------------+------------+
 
 
 
+-------------------------------------+
| No recent travel history available. |
+-------------------------------------+
 documented as of this encounter
 
 Plan of Treatment
 
 
+--------+-----------+------------+----------------------+-------------------+
| Date   | Type      | Specialty  | Care Team            | Description       |
+--------+-----------+------------+----------------------+-------------------+
| / | Office    | Cardiology |   HellalfredoTonia,    |                   |
|    | Visit     |            | ARNP  [address]   |                   |
|        |           |            | St  [place], [place]  |                   |
|        |           |            | [address]  [phone]  |                   |
|        |           |            |  [phone] (Fax)  |                   |
+--------+-----------+------------+----------------------+-------------------+
| / | Hospital  | Radiology  |   Popeye Townsend, |                   |
|    | Encounter |            |  MD  [address] |                   |
|        |           |            |  St.  [place],   |                   |
|        |           |            | [place] [address]             |                   |
|        |           |            | [phone]         |                   |
|        |           |            | [phone] (Fax)   |                   |
+--------+-----------+------------+----------------------+-------------------+
| / | Surgery   | Radiology  |   Popeye Townsend, | CV EP PPM SYSTEM  |
 
|    |           |            |  MD  [address] | IMPLANT           |
|        |           |            |  St.  [place],   |                   |
|        |           |            | [place] [address]             |                   |
|        |           |            | [phone]         |                   |
|        |           |            | [phone] (Fax)   |                   |
+--------+-----------+------------+----------------------+-------------------+
| 02/10/ | Clinical  | Cardiology |                      |                   |
|    | Support   |            |                      |                   |
+--------+-----------+------------+----------------------+-------------------+
| / | Office    | Cardiology |   Tonia Wilson,    |                   |
|    | Visit     |            | ARNP  [address]   |                   |
|        |           |            | [place]  |                   |
|        |           |            | [address]  [phone]  |                   |
|        |           |            |  [phone] (Fax)  |                   |
+--------+-----------+------------+----------------------+-------------------+
| / | Off-Site  | Nephrology |   Marzena Moser  |                   |
|    | Visit     |            | DO ETIENNE  [address] [place] [address]      |                   |
|        |           |            | [address]      |                   |
|        |           |            | [place]      |                   |
|        |           |            | [address]  [phone]  |                   |
|        |           |            |  [phone] (Fax)  |                   |
+--------+-----------+------------+----------------------+-------------------+
 documented as of this encounter
 
 Visit Diagnoses
 Not on filedocumented in this encounter

## 2020-01-08 NOTE — XMS
Encounter Summary
  Created on: 2020
 
 Viviana Ricci
 External Reference #: [id number]
 : 47
 Sex: Female
 
 Demographics
 
 
+-----------------------+----------------------+
| Address               | [address]      |
|                       | [place]  [address] |
+-----------------------+----------------------+
| Home Phone            | [phone]      |
+-----------------------+----------------------+
| Preferred Language    | Unknown              |
+-----------------------+----------------------+
| Marital Status        | Single               |
+-----------------------+----------------------+
| [employer] Affiliation | 1009                 |
+-----------------------+----------------------+
| Race                  | Unknown              |
+-----------------------+----------------------+
| Ethnic Group          | Unknown              |
+-----------------------+----------------------+
 
 
 Author
 
 
+--------------+--------------------------------------------+
| Author       | [facility]  |
|              | and [place]                                |
+--------------+--------------------------------------------+
| Organization | [facility]  |
|              | and [place]                                |
+--------------+--------------------------------------------+
| Address      | Unknown                                    |
+--------------+--------------------------------------------+
| Phone        | Unavailable                                |
+--------------+--------------------------------------------+
 
 
 
 Support
 
 
+----------------+--------------+---------------------+-----------------+
| Name           | Relationship | Address             | Phone           |
+----------------+--------------+---------------------+-----------------+
| Ada/Ed Radhames | ECON         | [address]              | [phone] |
|                |              | [place]  |                 |
|                |              |  [address]              |                 |
+----------------+--------------+---------------------+-----------------+
 
 
 
 
 Care Team Providers
 
 
+-----------------------+------+-------------+
| Care Team Member Name | Role | Phone       |
+-----------------------+------+-------------+
 PCP  | Unavailable |
+-----------------------+------+-------------+
 
 
 
 Encounter Details
 
 
+--------+----------+----------------------+----------------------+-------------+
| Date   | Type     | Department           | Care Team            | Description |
+--------+----------+----------------------+----------------------+-------------+
| / | Imaging  |   [facility] |   Provider,          |             |
| 2018   | Exam     |  [facility]    | MD Evette  [id number] |             |
|        |          | IMAGING              |  [address]        |             |
|        |          | [phone]         | [place] [address]     |             |
+--------+----------+----------------------+----------------------+-------------+
 
 
 
 Social History
 
 
+--------------+-------+-----------+--------+------+
| Tobacco Use  | Types | Packs/Day | Years  | Date |
|              |       |           | Used   |      |
+--------------+-------+-----------+--------+------+
| Never Smoker |       |           |        |      |
+--------------+-------+-----------+--------+------+
 
 
 
+---------------------+---+---+---+
| Smokeless Tobacco:  |   |   |   |
| Never Used          |   |   |   |
+---------------------+---+---+---+
 
 
 
+---------------------------------------------------------------+
| Comments: some second hand smoke exposure, but fairly minimal |
+---------------------------------------------------------------+
 
 
 
+-------------+-------------+---------+----------+
| Alcohol Use | Drinks/Week | oz/Week | Comments |
+-------------+-------------+---------+----------+
| No          |             |         |          |
+-------------+-------------+---------+----------+
 
 
 
+------------------+---------------+
 
| Sex Assigned at  | Date Recorded |
| Birth            |               |
+------------------+---------------+
| Not on file      |               |
+------------------+---------------+
 
 
 
+----------------+-------------+-------------+
| Job Start Date | Occupation  | Industry    |
+----------------+-------------+-------------+
| Not on file    | Not on file | Not on file |
+----------------+-------------+-------------+
 
 
 
+----------------+--------------+------------+
| Travel History | Travel Start | Travel End |
+----------------+--------------+------------+
 
 
 
+-------------------------------------+
| No recent travel history available. |
+-------------------------------------+
 documented as of this encounter
 
 Plan of Treatment
 
 
+--------+-----------+------------+----------------------+-------------------+
| Date   | Type      | Specialty  | Care Team            | Description       |
+--------+-----------+------------+----------------------+-------------------+
| / | Office    | Cardiology |   Tonia Wilson,    |                   |
|    | Visit     |            | ARNP  [address]   |                   |
|        |           |            |   [place]  |                   |
|        |           |            | [address]  [phone]  |                   |
|        |           |            |  [phone] (Fax)  |                   |
+--------+-----------+------------+----------------------+-------------------+
| / | Hospital  | Radiology  |   Popeye Townsend, |                   |
|    | Encounter |            |  MD  [address] |                   |
|        |           |            |  St.  [place],   |                   |
|        |           |            | [place] [address]             |                   |
|        |           |            | [phone]         |                   |
|        |           |            | [phone] (Fax)   |                   |
+--------+-----------+------------+----------------------+-------------------+
| / | Surgery   | Radiology  |   Popeye Townsend, | CV EP PPM SYSTEM  |
|    |           |            |  MD  [address] | IMPLANT           |
|        |           |            |  St.  [place],   |                   |
|        |           |            | [place] [address]             |                   |
|        |           |            | [phone]         |                   |
|        |           |            | [phone] (Fax)   |                   |
+--------+-----------+------------+----------------------+-------------------+
| 02/10/ | Clinical  | Cardiology |                      |                   |
|    | Support   |            |                      |                   |
+--------+-----------+------------+----------------------+-------------------+
| / | Office    | Cardiology |   Tonia Wilson,    |                   |
|    | Visit     |            | ARNP  [address]   |                   |
|        |           |            | St  [place]  |                   |
|        |           |            | [address]  [phone]  |                   |
 
|        |           |            |  [phone] (Fax)  |                   |
+--------+-----------+------------+----------------------+-------------------+
| / | Off-Site  | Nephrology |   Shanti Marzena  |                   |
|    | Visit     |            | M, DO  [address]      |                   |
|        |           |            | [address]      |                   |
|        |           |            | [place]      |                   |
|        |           |            | [address]  [phone]  |                   |
|        |           |            |  [phone] (Fax)  |                   |
+--------+-----------+------------+----------------------+-------------------+
 documented as of this encounter
 
 Procedures
 
 
+------------------+--------+-------------+----------------------+----------------------+
| Procedure Name   | Priori | Date/Time   | Associated Diagnosis | Comments             |
|                  | ty     |             |                      |                      |
+------------------+--------+-------------+----------------------+----------------------+
| XR CHEST 2 VIEWS | Routin | 2016  |                      |   Results for this   |
|                  | e      |  1:20 PM    |                      | procedure are in the |
|                  |        | PST         |                      |  results section.    |
+------------------+--------+-------------+----------------------+----------------------+
 documented in this encounter
 
 Results
 XR Chest 2 Vws (2016  1:20 PM PST)
 
+----------+
| Specimen |
+----------+
|          |
+----------+
 
 
 
+-----------------------------------------+---------------+
| Narrative                               | Performed At  |
+-----------------------------------------+---------------+
|   External films for comparison only    |   [facility] |
|                                         |               |
|  No results will be in the chart.       |               |
+-----------------------------------------+---------------+
 
 
 
+---------------+---------+--------------------+--------------+
| Performing    | Address | City/State/Zipcode | Phone Number |
| Organization  |         |                    |              |
+---------------+---------+--------------------+--------------+
|   [facility] |         |                    |              |
+---------------+---------+--------------------+--------------+
 documented in this encounter
 
 Visit Diagnoses
 Not on filedocumented in this encounter

## 2020-01-08 NOTE — XMS
Encounter Summary
  Created on: 2020
 
 Viviana Ricci
 External Reference #: [id number]
 : 47
 Sex: Female
 
 Demographics
 
 
+-----------------------+----------------------+
| Address               | [address]      |
|                       | [place]  [address] |
+-----------------------+----------------------+
| Home Phone            | [phone]      |
+-----------------------+----------------------+
| Preferred Language    | Unknown              |
+-----------------------+----------------------+
| Marital Status        | Single               |
+-----------------------+----------------------+
| [employer] Affiliation | 1009                 |
+-----------------------+----------------------+
| Race                  | Unknown              |
+-----------------------+----------------------+
| Ethnic Group          | Unknown              |
+-----------------------+----------------------+
 
 
 Author
 
 
+--------------+--------------------------------------------+
| Author       | [facility]  |
|              | and [place]                                |
+--------------+--------------------------------------------+
| Organization | [facility]  |
|              | and [place]                                |
+--------------+--------------------------------------------+
| Address      | Unknown                                    |
+--------------+--------------------------------------------+
| Phone        | Unavailable                                |
+--------------+--------------------------------------------+
 
 
 
 Support
 
 
+----------------+--------------+---------------------+-----------------+
| Name           | Relationship | Address             | Phone           |
+----------------+--------------+---------------------+-----------------+
| Ada/Ed Radhames | ECON         | [address]              | [phone] |
|                |              | [place], [place]  |                 |
|                |              |  [address]              |                 |
+----------------+--------------+---------------------+-----------------+
 
 
 
 
 Care Team Providers
 
 
+-----------------------+------+-------------+
| Care Team Member Name | Role | Phone       |
+-----------------------+------+-------------+
 PCP  | Unavailable |
+-----------------------+------+-------------+
 
 
 
 Encounter Details
 
 
+--------+-------------+---------------------+----------------------+-------------+
| Date   | Type        | Department          | Care Team            | Description |
+--------+-------------+---------------------+----------------------+-------------+
| / | Orders Only |   [facility]         |   Letty Hart  |             |
|    |             | NEPHROLOGY  [address]   | [address]                |             |
|        |             | [address]   |                      |             |
|        |             | [place]     |                      |             |
|        |             | [id number]          |                      |             |
|        |             | [phone]        |                      |             |
+--------+-------------+---------------------+----------------------+-------------+
 
 
 
 Social History
 
 
+--------------+-------+-----------+--------+------+
| Tobacco Use  | Types | Packs/Day | Years  | Date |
|              |       |           | Used   |      |
+--------------+-------+-----------+--------+------+
| Never Smoker |       |           |        |      |
+--------------+-------+-----------+--------+------+
 
 
 
+---------------------+---+---+---+
| Smokeless Tobacco:  |   |   |   |
| Never Used          |   |   |   |
+---------------------+---+---+---+
 
 
 
+---------------------------------------------------------------+
| Comments: some second hand smoke exposure, but fairly minimal |
+---------------------------------------------------------------+
 
 
 
+-------------+-------------+---------+----------+
| Alcohol Use | Drinks/Week | oz/Week | Comments |
+-------------+-------------+---------+----------+
| No          |             |         |          |
+-------------+-------------+---------+----------+
 
 
 
 
+------------------+---------------+
| Sex Assigned at  | Date Recorded |
| Birth            |               |
+------------------+---------------+
| Not on file      |               |
+------------------+---------------+
 
 
 
+----------------+-------------+-------------+
| Job Start Date | Occupation  | Industry    |
+----------------+-------------+-------------+
| Not on file    | Not on file | Not on file |
+----------------+-------------+-------------+
 
 
 
+----------------+--------------+------------+
| Travel History | Travel Start | Travel End |
+----------------+--------------+------------+
 
 
 
+-------------------------------------+
| No recent travel history available. |
+-------------------------------------+
 documented as of this encounter
 
 Plan of Treatment
 
 
+--------+-----------+------------+----------------------+-------------------+
| Date   | Type      | Specialty  | Care Team            | Description       |
+--------+-----------+------------+----------------------+-------------------+
| / | Office    | Cardiology |   Tonia Wilson,    |                   |
|    | Visit     |            | BELKIS  [address]   |                   |
|        |           |            | St  [place]  |                   |
|        |           |            | [address]  [phone]  |                   |
|        |           |            |  [phone] (Fax)  |                   |
+--------+-----------+------------+----------------------+-------------------+
| / | Hospital  | Radiology  |   Popeye Townsend, |                   |
|    | Encounter |            |  MD  [address] |                   |
|        |           |            |  St.  [place],   |                   |
|        |           |            | [place] [address]             |                   |
|        |           |            | [phone]         |                   |
|        |           |            | [phone] (Fax)   |                   |
+--------+-----------+------------+----------------------+-------------------+
| / | Surgery   | Radiology  |   Popeye Townsend, | CV EP PPM SYSTEM  |
|    |           |            |  MD  [address] | IMPLANT           |
|        |           |            |  St.  [place],   |                   |
|        |           |            | [place] [address]             |                   |
|        |           |            | [phone]         |                   |
|        |           |            | [phone] (Fax)   |                   |
+--------+-----------+------------+----------------------+-------------------+
| 02/10/ | Clinical  | Cardiology |                      |                   |
|    | Support   |            |                      |                   |
+--------+-----------+------------+----------------------+-------------------+
| / | Office    | Cardiology |   Tonia Wilson,    |                   |
|    | Visit     |            | ARNP  [address]   |                   |
 
|        |           |            | St  [place], [place]  |                   |
|        |           |            | [address]  [phone]  |                   |
|        |           |            |  [phone] (Fax)  |                   |
+--------+-----------+------------+----------------------+-------------------+
| / | Off-Site  | Nephrology |   Marzena Moser  |                   |
|    | Visit     |            | DO ETIENNE  [address] [place] [address]      |                   |
|        |           |            | [address]      |                   |
|        |           |            | [place]      |                   |
|        |           |            | [address] [phone]  |                   |
|        |           |            |  [phone] (Fax)  |                   |
+--------+-----------+------------+----------------------+-------------------+
 documented as of this encounter
 
 Visit Diagnoses
 Not on filedocumented in this encounter

## 2020-01-08 NOTE — XMS
Encounter Summary
  Created on: 2020
 
 Viviana Ricci
 External Reference #: [id number]
 : 47
 Sex: Female
 
 Demographics
 
 
+-----------------------+----------------------+
| Address               | [address]      |
|                       | [place]  [address] |
+-----------------------+----------------------+
| Home Phone            | [phone]      |
+-----------------------+----------------------+
| Preferred Language    | Unknown              |
+-----------------------+----------------------+
| Marital Status        | Single               |
+-----------------------+----------------------+
| Church Affiliation | [id number]                 |
+-----------------------+----------------------+
| Race                  | Unknown              |
+-----------------------+----------------------+
| Ethnic Group          | Unknown              |
+-----------------------+----------------------+
 
 
 Author
 
 
+--------------+--------------------------------------------+
| Author       | [facility]  |
|              | and [place]                                |
+--------------+--------------------------------------------+
| Organization | [facility]  |
|              | and [place]                                |
+--------------+--------------------------------------------+
| Address      | Unknown                                    |
+--------------+--------------------------------------------+
| Phone        | Unavailable                                |
+--------------+--------------------------------------------+
 
 
 
 Support
 
 
+----------------+--------------+---------------------+-----------------+
| Name           | Relationship | Address             | Phone           |
+----------------+--------------+---------------------+-----------------+
| Ada/Ed Radhames | ECON         | [place]              | [phone] |
|                |              | [place]  |                 |
|                |              |  [address]              |                 |
+----------------+--------------+---------------------+-----------------+
 
 
 
 
 Care Team Providers
 
 
+-----------------------+------+-------------+
| Care Team Member Name | Role | Phone       |
+-----------------------+------+-------------+
 PCP  | Unavailable |
+-----------------------+------+-------------+
 
 
 
 Reason for Visit
 
 
+-------------------+----------+
| Reason            | Comments |
+-------------------+----------+
| Medication Refill |          |
+-------------------+----------+
 
 
 
 Encounter Details
 
 
+--------+--------+---------------------+----------------------+-------------------+
| Date   | Type   | Department          | Care Team            | Description       |
+--------+--------+---------------------+----------------------+-------------------+
| / | Refill |   [facility]         |   Marzena Moser  | Medication Refill |
|    |        | NEPHROLOGY  [address]   | M, DO  [address]      |                   |
|        |        | [address]   | [address]      |                   |
|        |        | [place], [place]     | [place], [place]      |                   |
|        |        | [address]          | [address]  [phone]  |                   |
|        |        | [phone]        |  [phone] (Fax)  |                   |
+--------+--------+---------------------+----------------------+-------------------+
 
 
 
 Social History
 
 
+--------------+-------+-----------+--------+------+
| Tobacco Use  | Types | Packs/Day | Years  | Date |
|              |       |           | Used   |      |
+--------------+-------+-----------+--------+------+
| Never Smoker |       |           |        |      |
+--------------+-------+-----------+--------+------+
 
 
 
+---------------------+---+---+---+
| Smokeless Tobacco:  |   |   |   |
| Never Used          |   |   |   |
+---------------------+---+---+---+
 
 
 
+---------------------------------------------------------------+
| Comments: some second hand smoke exposure, but fairly minimal |
 
+---------------------------------------------------------------+
 
 
 
+-------------+-------------+---------+----------+
| Alcohol Use | Drinks/Week | oz/Week | Comments |
+-------------+-------------+---------+----------+
| No          |             |         |          |
+-------------+-------------+---------+----------+
 
 
 
+------------------+---------------+
| Sex Assigned at  | Date Recorded |
| Birth            |               |
+------------------+---------------+
| Not on file      |               |
+------------------+---------------+
 
 
 
+----------------+-------------+-------------+
| Job Start Date | Occupation  | Industry    |
+----------------+-------------+-------------+
| Not on file    | Not on file | Not on file |
+----------------+-------------+-------------+
 
 
 
+----------------+--------------+------------+
| Travel History | Travel Start | Travel End |
+----------------+--------------+------------+
 
 
 
+-------------------------------------+
| No recent travel history available. |
+-------------------------------------+
 documented as of this encounter
 
 Plan of Treatment
 
 
+--------+-----------+------------+----------------------+-------------------+
| Date   | Type      | Specialty  | Care Team            | Description       |
+--------+-----------+------------+----------------------+-------------------+
| / | Office    | Cardiology |   HellalfredoTonia,    |                   |
|    | Visit     |            | ARNP  [address]   |                   |
|        |           |            | St  [place], [place]  |                   |
|        |           |            | [address]  [phone]  |                   |
|        |           |            |  [phone] (Fax)  |                   |
+--------+-----------+------------+----------------------+-------------------+
| / | Hospital  | Radiology  |   Popeye Townsend, |                   |
|    | Encounter |            |  MD  [address] |                   |
|        |           |            |  St.  [place],   |                   |
|        |           |            | [place] [address]             |                   |
|        |           |            | [phone]         |                   |
|        |           |            | [phone] (Fax)   |                   |
+--------+-----------+------------+----------------------+-------------------+
| / | Surgery   | Radiology  |   Popeye Townsend, | CV EP PPM SYSTEM  |
 
|    |           |            |  MD  [address] | IMPLANT           |
|        |           |            |  St.  [place],   |                   |
|        |           |            | [place] [address]             |                   |
|        |           |            | [phone]         |                   |
|        |           |            | [phone] (Fax)   |                   |
+--------+-----------+------------+----------------------+-------------------+
| 02/10/ | Clinical  | Cardiology |                      |                   |
|    | Support   |            |                      |                   |
+--------+-----------+------------+----------------------+-------------------+
| / | Office    | Cardiology |   Tonia Wilson,    |                   |
|    | Visit     |            | ARNP  [address]   |                   |
|        |           |            | [place]  |                   |
|        |           |            | [address]  [phone]  |                   |
|        |           |            |  [phone] (Fax)  |                   |
+--------+-----------+------------+----------------------+-------------------+
| / | Off-Site  | Nephrology |   Marzena Moser  |                   |
|    | Visit     |            | DO ETIENNE  [address] [place] [address]      |                   |
|        |           |            | [address]      |                   |
|        |           |            | [place]      |                   |
|        |           |            | [address]  [phone]  |                   |
|        |           |            |  [phone] (Fax)  |                   |
+--------+-----------+------------+----------------------+-------------------+
 documented as of this encounter
 
 Visit Diagnoses
 Not on filedocumented in this encounter

## 2020-01-08 NOTE — XMS
Encounter Summary
  Created on: 2020
 
 Viviana Ricci
 External Reference #: [id number]
 : 47
 Sex: Female
 
 Demographics
 
 
+-----------------------+----------------------+
| Address               | [address]      |
|                       | [place]  [address] |
+-----------------------+----------------------+
| Home Phone            | [phone]      |
+-----------------------+----------------------+
| Preferred Language    | Unknown              |
+-----------------------+----------------------+
| Marital Status        | Single               |
+-----------------------+----------------------+
| Pentec[employer]al Affiliation | [id number]                 |
+-----------------------+----------------------+
| Race                  | Unknown              |
+-----------------------+----------------------+
| Ethnic Group          | Unknown              |
+-----------------------+----------------------+
 
 
 Author
 
 
+--------------+--------------------------------------------+
| Author       | [facility]  |
|              | and [place]                                |
+--------------+--------------------------------------------+
| Organization | [facility]  |
|              | and [place]                                |
+--------------+--------------------------------------------+
| Address      | Unknown                                    |
+--------------+--------------------------------------------+
| Phone        | Unavailable                                |
+--------------+--------------------------------------------+
 
 
 
 Support
 
 
+----------------+--------------+---------------------+-----------------+
| Name           | Relationship | Address             | Phone           |
+----------------+--------------+---------------------+-----------------+
| Ada/Ed Radhames | ECON         | [place]              | [phone] |
|                |              | [place]  |                 |
|                |              |  [address]              |                 |
+----------------+--------------+---------------------+-----------------+
 
 
 
 
 Care Team Providers
 
 
+-----------------------+------+-------------+
| Care Team Member Name | Role | Phone       |
+-----------------------+------+-------------+
 PCP  | Unavailable |
+-----------------------+------+-------------+
 
 
 
 Encounter Details
 
 
+--------+----------+----------------------+----------------------+-------------+
| Date   | Type     | Department           | Care Team            | Description |
+--------+----------+----------------------+----------------------+-------------+
| 10/10/ | Abstract |   [place] [facility]  |   [address]   |             |
|    |          | Conversion Location  | [address]   |             |
|        |          |  [phone]        | [place]      |             |
|        |          |                      | [address]  [phone]  |             |
|        |          |                      |  [phone] (Fax)  |             |
+--------+----------+----------------------+----------------------+-------------+
 
 
 
 Social History
 
 
+----------------+-------+-----------+--------+------+
| Tobacco Use    | Types | Packs/Day | Years  | Date |
|                |       |           | Used   |      |
+----------------+-------+-----------+--------+------+
| Never Assessed |       |           |        |      |
+----------------+-------+-----------+--------+------+
 
 
 
+------------------+---------------+
| Sex Assigned at  | Date Recorded |
| Birth            |               |
+------------------+---------------+
| Not on file      |               |
+------------------+---------------+
 
 
 
+----------------+-------------+-------------+
| Job Start Date | Occupation  | Industry    |
+----------------+-------------+-------------+
| Not on file    | Not on file | Not on file |
+----------------+-------------+-------------+
 
 
 
+----------------+--------------+------------+
| Travel History | Travel Start | Travel End |
+----------------+--------------+------------+
 
 
 
 
+-------------------------------------+
| No recent travel history available. |
+-------------------------------------+
 documented as of this encounter
 
 Plan of Treatment
 
 
+--------+-----------+------------+----------------------+-------------------+
| Date   | Type      | Specialty  | Care Team            | Description       |
+--------+-----------+------------+----------------------+-------------------+
| / | Office    | Cardiology |   Tonia Wilson,    |                   |
|    | Visit     |            | BELKIS  [address]   |                   |
|        |           |            | St  [place]  |                   |
|        |           |            | [address]  [phone]  |                   |
|        |           |            |  [phone] (Fax)  |                   |
+--------+-----------+------------+----------------------+-------------------+
| / | Hospital  | Radiology  |   Popeye Townsend, |                   |
|    | Encounter |            |  MD  [address] |                   |
|        |           |            |  St.  [place],   |                   |
|        |           |            | [place] [address]             |                   |
|        |           |            | [phone]         |                   |
|        |           |            | [phone] (Fax)   |                   |
+--------+-----------+------------+----------------------+-------------------+
| / | Surgery   | Radiology  |   Popeye Townsend, | CV EP PPM SYSTEM  |
|    |           |            |  MD  [address] | IMPLANT           |
|        |           |            |  St.  [place],   |                   |
|        |           |            | [place] [address]             |                   |
|        |           |            | [phone]         |                   |
|        |           |            | [phone] (Fax)   |                   |
+--------+-----------+------------+----------------------+-------------------+
| 02/10/ | Clinical  | Cardiology |                      |                   |
|    | Support   |            |                      |                   |
+--------+-----------+------------+----------------------+-------------------+
| / | Office    | Cardiology |   Tonia Wilson,    |                   |
|    | Visit     |            | BELKIS  [address]   |                   |
|        |           |            | [place]  |                   |
|        |           |            | [address]  [phone]  |                   |
|        |           |            |  [phone] (Fax)  |                   |
+--------+-----------+------------+----------------------+-------------------+
| / | Off-Site  | Nephrology |   Marzena Moser  |                   |
|    | Visit     |            | DO ETIENNE  [address] [place] [address]      |                   |
|        |           |            | [address]      |                   |
|        |           |            | [place]      |                   |
|        |           |            | [address]  [phone]  |                   |
|        |           |            |  [phone] (Fax)  |                   |
+--------+-----------+------------+----------------------+-------------------+
 documented as of this encounter
 
 Visit Diagnoses
 Not on filedocumented in this encounter

## 2020-01-08 NOTE — XMS
Encounter Summary
  Created on: 2020
 
 Viviana Ricci
 External Reference #: [id number]
 : 47
 Sex: Female
 
 Demographics
 
 
+-----------------------+----------------------+
| Address               | [address]      |
|                       | [place]  [address] |
+-----------------------+----------------------+
| Home Phone            | [phone]      |
+-----------------------+----------------------+
| Preferred Language    | Unknown              |
+-----------------------+----------------------+
| Marital Status        | Single               |
+-----------------------+----------------------+
| Hoahaoism Affiliation | [id number]                 |
+-----------------------+----------------------+
| Race                  | Unknown              |
+-----------------------+----------------------+
| Ethnic Group          | Unknown              |
+-----------------------+----------------------+
 
 
 Author
 
 
+--------------+--------------------------------------------+
| Author       | [facility]  |
|              | and [facility]                                |
+--------------+--------------------------------------------+
| Organization | [facility]  |
|              | and [facility]                                |
+--------------+--------------------------------------------+
| Address      | Unknown                                    |
+--------------+--------------------------------------------+
| Phone        | Unavailable                                |
+--------------+--------------------------------------------+
 
 
 
 Support
 
 
+----------------+--------------+---------------------+-----------------+
| Name           | Relationship | Address             | Phone           |
+----------------+--------------+---------------------+-----------------+
| Ada/Ed Radhames | ECON         | [place]              | [phone] |
|                |              | [place], [place]  |                 |
|                |              |  [id number]              |                 |
+----------------+--------------+---------------------+-----------------+
 
 
 
 
 Care Team Providers
 
 
+-----------------------+------+-------------+
| Care Team Member Name | Role | Phone       |
+-----------------------+------+-------------+
 PCP  | Unavailable |
+-----------------------+------+-------------+
 
 
 
 Encounter Details
 
 
+--------+-----------+----------------------+----------------------+-------------+
| Date   | Type      | Department           | Care Team            | Description |
+--------+-----------+----------------------+----------------------+-------------+
| / | [facility]  |   [facility] |   Popeye Townsend, |             |
|    | Encounter |  MED CTR NUCLEAR     |  MD  [address] |             |
|        |           | MEDICINE  [address]      |  [address]   |             |
|        |           | [place]  [place], | [place] [address]             |             |
|        |           |  [place] [address]       | [phone]         |             |
|        |           | [phone]         | [phone] (Fax)   |             |
+--------+-----------+----------------------+----------------------+-------------+
 
 
 
 Social History
 
 
+--------------+-------+-----------+--------+------+
| Tobacco Use  | Types | Packs/Day | Years  | Date |
|              |       |           | Used   |      |
+--------------+-------+-----------+--------+------+
| Never Smoker |       |           |        |      |
+--------------+-------+-----------+--------+------+
 
 
 
+---------------------+---+---+---+
| Smokeless Tobacco:  |   |   |   |
| Never Used          |   |   |   |
+---------------------+---+---+---+
 
 
 
+---------------------------------------------------------------+
| Comments: some second hand smoke exposure, but fairly minimal |
+---------------------------------------------------------------+
 
 
 
+-------------+-------------+---------+----------+
| Alcohol Use | Drinks/Week | oz/Week | Comments |
+-------------+-------------+---------+----------+
| No          |             |         |          |
+-------------+-------------+---------+----------+
 
 
 
 
+------------------+---------------+
| Sex Assigned at  | Date Recorded |
| Birth            |               |
+------------------+---------------+
| Not on file      |               |
+------------------+---------------+
 
 
 
+----------------+-------------+-------------+
| Job Start Date | Occupation  | Industry    |
+----------------+-------------+-------------+
| Not on file    | Not on file | Not on file |
+----------------+-------------+-------------+
 
 
 
+----------------+--------------+------------+
| Travel History | Travel Start | Travel End |
+----------------+--------------+------------+
 
 
 
+-------------------------------------+
| No recent travel history available. |
+-------------------------------------+
 documented as of this encounter
 
 Medications at Time of Discharge
 
 
+----------------------+----------------------+-----------+---------+----------+-----------+
| Medication           | Sig                  | Dispensed | Refills | Start    | End Date  |
|                      |                      |           |         | Date     |           |
+----------------------+----------------------+-----------+---------+----------+-----------+
|   cholecalciferol    | Take 2,000 Units by  |           | 0       |          |           |
| (VITAMIN D-3) 2000   | mouth Every other    |           |         |          |           |
| UNITS                | day.                 |           |         |          |           |
| TABSIndications:     |                      |           |         |          |           |
| Unspecified          |                      |           |         |          |           |
| hypertensive kidney  |                      |           |         |          |           |
| disease with chronic |                      |           |         |          |           |
|  kidney disease      |                      |           |         |          |           |
| stage I through      |                      |           |         |          |           |
| stage IV, or         |                      |           |         |          |           |
| unspecified(403.90), |                      |           |         |          |           |
|  FSGS (focal         |                      |           |         |          |           |
| segmental            |                      |           |         |          |           |
| glomerulosclerosis), |                      |           |         |          |           |
|  Hyperlipidemia,     |                      |           |         |          |           |
| Complications of     |                      |           |         |          |           |
| transplanted kidney  |                      |           |         |          |           |
+----------------------+----------------------+-----------+---------+----------+-----------+
|   glucose blood VI   | Check blood sugar    |   100     | 12      | 20 |           |
| test strips (ONE     | before each meal and | each      |         | 12       |           |
| TOUCH ULTRA TEST)    |  as directed         |           |         |          |           |
| stripIndications:    |                      |           |         |          |           |
| Unspecified          |                      |           |         |          |           |
| hypertensive kidney  |                      |           |         |          |           |
 
| disease with chronic |                      |           |         |          |           |
|  kidney disease      |                      |           |         |          |           |
| stage I through      |                      |           |         |          |           |
| stage IV, or         |                      |           |         |          |           |
| unspecified(403.90), |                      |           |         |          |           |
|  Complications of    |                      |           |         |          |           |
| transplanted kidney, |                      |           |         |          |           |
|  Diabetes mellitus   |                      |           |         |          |           |
| type II,             |                      |           |         |          |           |
| uncontrolled (HCC),  |                      |           |         |          |           |
| Hyperlipidemia       |                      |           |         |          |           |
+----------------------+----------------------+-----------+---------+----------+-----------+
|   Respiratory        | Decrease CPAP to     |   1 each  | 0       | 06/19/20 |           |
| Therapy Supplies     | 12-18 cmH2O          |           |         | 13       |           |
| MISC                 | Diagnosis            |           |         |          |           |
|                      | Code(s)327.23.       |           |         |          |           |
|                      | Please send order to |           |         |          |           |
|                      |  [facility].     |           |         |          |           |
+----------------------+----------------------+-----------+---------+----------+-----------+
|   allopurinol        | Take 1 tablet by     |   30      | 11      | 02/10/20 |  |
| (ZYLOPRIM) 100 mg    | mouth Daily.         | tablet    |         | 14       | 5         |
| tablet               |                      |           |         |          |           |
+----------------------+----------------------+-----------+---------+----------+-----------+
|   aspirin 81 MG EC   | Take 81 mg by mouth  |           | 0       | 20 |  |
| tablet               | Daily.               |           |         | 12       | 5         |
+----------------------+----------------------+-----------+---------+----------+-----------+
|   cinacalcet         | Take 1 tablet by     |   30      | 12      | 20 |  |
| (SENSIPAR) 30 mg     | mouth Daily.         | tablet    |         | 13       | 4         |
| tablet               |                      |           |         |          |           |
+----------------------+----------------------+-----------+---------+----------+-----------+
|   fludrocortisone    | Take 1 tablet by     |   45      | 2       | 20 |  |
| (FLORINEF) 0.1 mg    | mouth Every other    | tablet    |         | 14       | 5         |
| tablet               | day.                 |           |         |          |           |
+----------------------+----------------------+-----------+---------+----------+-----------+
|   fluticasone        | Inhale 1 puff into   |   1       | 11      | 20 |  |
| (FLOVENT HFA) 220    | the lungs 2 times    | Inhaler   |         | 13       | 5         |
| mcg/puff inhaler     | daily. Rinse mouth   |           |         |          |           |
|                      | after use.           |           |         |          |           |
+----------------------+----------------------+-----------+---------+----------+-----------+
|   furosemide (LASIX) | Take 1 tablet by     |   30      | 5       | 20 |  |
|  40 mg tablet        | mouth Daily.         | tablet    |         | 13       | 4         |
+----------------------+----------------------+-----------+---------+----------+-----------+
|   insulin glargine   | Inject 20 Units      |   10 mL   | 0       | 20 |  |
| (LANTUS) 100         | under the skin every |           |         | 13       | 4         |
| units/mL             |  morning.            |           |         |          |           |
| injectionIndications |                      |           |         |          |           |
| : Unspecified        |                      |           |         |          |           |
| hypertensive kidney  |                      |           |         |          |           |
| disease with chronic |                      |           |         |          |           |
|  kidney disease      |                      |           |         |          |           |
| stage I through      |                      |           |         |          |           |
| stage IV, or         |                      |           |         |          |           |
| unspecified(403.90), |                      |           |         |          |           |
|  Complications of    |                      |           |         |          |           |
| transplanted kidney, |                      |           |         |          |           |
|  Diabetes mellitus   |                      |           |         |          |           |
| type II,             |                      |           |         |          |           |
| uncontrolled ([facility]),  |                      |           |         |          |           |
| Hyperlipidemia       |                      |           |         |          |           |
+----------------------+----------------------+-----------+---------+----------+-----------+
 
|   insulin lispro     | Inject               |   10 pen  | 5       | 20 |  |
| (HUMALOG) 100        | subcutaneously       |           |         | 13       | 4         |
| units/mL injection   | before meals         |           |         |          |           |
|                      | according to sliding |           |         |          |           |
|                      |  scale               |           |         |          |           |
+----------------------+----------------------+-----------+---------+----------+-----------+
|   Insulin            | Use before meals and |   100     | 11      | 20 |  |
| Syringe-Needle U-100 |  as directed.        | each      |         | 13       | 4         |
|  (BD INSULIN SYRINGE |                      |           |         |          |           |
|  ULTRAFINE) 31G X    |                      |           |         |          |           |
| " 0.5 ML MISC    |                      |           |         |          |           |
+----------------------+----------------------+-----------+---------+----------+-----------+
|   levothyroxine      | Take 1 tablet by     |   30      | 11      | 20 | 10/06/201 |
| (LEVOTHROID) 50 mcg  | mouth Daily.         | tablet    |         | 13       | 4         |
| tablet               |                      |           |         |          |           |
+----------------------+----------------------+-----------+---------+----------+-----------+
|   lisinopril         | Take 1 tablet by     |   90      | 3       | 20 |  |
| (PRINIVIL,ZESTRIL)   | mouth Daily.         | tablet    |         | 13       | 4         |
| 30 MG tablet         |                      |           |         |          |           |
+----------------------+----------------------+-----------+---------+----------+-----------+
|   loperamide         | Take 2 mg by mouth   |           | 0       | 20 |  |
| (ANTI-DIARRHEAL) 2   | Daily as needed.     |           |         | 12       | 4         |
| mg capsule           |                      |           |         |          |           |
+----------------------+----------------------+-----------+---------+----------+-----------+
|   magnesium oxide    | Take 1 tablet by     |   62      | 12      | 20 |  |
| (MAG-OX) 400 mg      | mouth 2 times daily. | tablet    |         | 13       | 4         |
| tablet               |                      |           |         |          |           |
+----------------------+----------------------+-----------+---------+----------+-----------+
|   metoprolol         | Take 1 tablet by     |   60      | 11      | 20 |  |
| tartrate (LOPRESSOR) | mouth 2 times daily. | tablet    |         | 13       | 4         |
|  25 mg tablet        |                      |           |         |          |           |
+----------------------+----------------------+-----------+---------+----------+-----------+
|   mycophenolate      | Take 250 mg by mouth |           | 0       | 20 | 10/14/201 |
| (CELLCEPT) 250 mg    |  3 times daily.      |           |         | 11       | 5         |
| capsule              |                      |           |         |          |           |
+----------------------+----------------------+-----------+---------+----------+-----------+
|   omeprazole         | Take one capsule by  |   90      | 3       | 20 |  |
| (PRILOSEC) 20 mg     | mouth once daily on  | capsule   |         | 13       | 4         |
| capsule              | an empty stomach     |           |         |          |           |
+----------------------+----------------------+-----------+---------+----------+-----------+
|   predniSONE         | Take 1 tablet by     |   90      | 3       | 20 |  |
| (DELTASONE) 5 mg     | mouth Daily.         | tablet    |         | 14       | 5         |
| tablet               |                      |           |         |          |           |
+----------------------+----------------------+-----------+---------+----------+-----------+
|   Prenatal           | Take 0.8 mg by mouth |   30 each | 11      | 20 |  |
| Multivit-Min-Fe-FA   |  Daily.              |           |         | 13       | 4         |
| (PRENATAL VITAMINS)  |                      |           |         |          |           |
| 0.8 MG TABS          |                      |           |         |          |           |
+----------------------+----------------------+-----------+---------+----------+-----------+
|   Prenatal Vit-Fe    |                      |           | 0       | 20 |  |
| Fumarate-FA (PNV     |                      |           |         | 13       | 4         |
| PRENATAL PLUS        |                      |           |         |          |           |
| MULTIVITAMIN) 27-1   |                      |           |         |          |           |
| MG TABS              |                      |           |         |          |           |
+----------------------+----------------------+-----------+---------+----------+-----------+
|   rosuvastatin       | Take 1 tablet by     |   30      | 11      | 20 |  |
| (CRESTOR) 20 mg      | mouth nightly.       | tablet    |         | 13       | 4         |
| tablet               |                      |           |         |          |           |
+----------------------+----------------------+-----------+---------+----------+-----------+
|   tacrolimus         | TAD.                 |           | 0       | 20 | 07/15/201 |
 
| (PROGRAF) 0.5 mg     |                      |           |         | 12       | 4         |
| capsuleIndications:  |                      |           |         |          |           |
| Unspecified          |                      |           |         |          |           |
| hypertensive kidney  |                      |           |         |          |           |
| disease with chronic |                      |           |         |          |           |
|  kidney disease      |                      |           |         |          |           |
| stage I through      |                      |           |         |          |           |
| stage IV, or         |                      |           |         |          |           |
| unspecified(403.90), |                      |           |         |          |           |
|  Complications of    |                      |           |         |          |           |
| transplanted kidney, |                      |           |         |          |           |
|  Diabetes mellitus   |                      |           |         |          |           |
| type II,             |                      |           |         |          |           |
| uncontrolled (HCC),  |                      |           |         |          |           |
| Hyperlipidemia       |                      |           |         |          |           |
+----------------------+----------------------+-----------+---------+----------+-----------+
|   tacrolimus         | Take 1.5 mg by mouth |           | 0       | 20 |  |
| (PROGRAF) 1 mg       |  2 times daily.      |           |         | 13       | 4         |
| capsuleIndications:  |                      |           |         |          |           |
| Unspecified          |                      |           |         |          |           |
| hypertensive kidney  |                      |           |         |          |           |
| disease with chronic |                      |           |         |          |           |
|  kidney disease      |                      |           |         |          |           |
| stage I through      |                      |           |         |          |           |
| stage IV, or         |                      |           |         |          |           |
| unspecified(403.90), |                      |           |         |          |           |
|  FSGS (focal         |                      |           |         |          |           |
| segmental            |                      |           |         |          |           |
| glomerulosclerosis), |                      |           |         |          |           |
|  Hyperlipidemia,     |                      |           |         |          |           |
| Complications of     |                      |           |         |          |           |
| transplanted kidney  |                      |           |         |          |           |
+----------------------+----------------------+-----------+---------+----------+-----------+
|   valsartan (DIOVAN) | Take 1 tablet by     |   30      | 11      | 20 |  |
|  160 mg tablet       | mouth Daily.         | tablet    |         | 14       | 4         |
+----------------------+----------------------+-----------+---------+----------+-----------+
 documented as of this encounter
 
 Plan of Treatment
 
 
+--------+-----------+------------+----------------------+-------------------+
| Date   | Type      | Specialty  | Care Team            | Description       |
+--------+-----------+------------+----------------------+-------------------+
| / | Office    | Cardiology |   Tonia Wilson,    |                   |
|    | Visit     |            | ARNP  [address]   |                   |
|        |           |            | [place]  |                   |
|        |           |            | [id number] [phone]  |                   |
|        |           |            |  [phone] (Fax)  |                   |
+--------+-----------+------------+----------------------+-------------------+
| / | Hospital  | Radiology  |   Popeye Townsend, |                   |
|    | Encounter |            |  MD  [address] |                   |
|        |           |            |  [place]   |                   |
|        |           |            | [place] [address]             |                   |
|        |           |            | [phone]         |                   |
|        |           |            | [phone] (Fax)   |                   |
+--------+-----------+------------+----------------------+-------------------+
| / | Surgery   | Radiology  |   IrineoroycechidiPopeye, | CV EP PPM SYSTEM  |
|    |           |            |  MD  [address] | IMPLANT           |
|        |           |            |  [place]   |                   |
 
|        |           |            | [place] [address]             |                   |
|        |           |            | [phone]         |                   |
|        |           |            | [phone] (Fax)   |                   |
+--------+-----------+------------+----------------------+-------------------+
| 02/10/ | Clinical  | Cardiology |                      |                   |
|    | Support   |            |                      |                   |
+--------+-----------+------------+----------------------+-------------------+
| / | Office    | Cardiology |   Tonia Wilson,    |                   |
|    | Visit     |            | ARNJESSICA  [address]   |                   |
|        |           |            | St  [place]  |                   |
|        |           |            | [address]  [phone]  |                   |
|        |           |            |  [phone] (Fax)  |                   |
+--------+-----------+------------+----------------------+-------------------+
| / | Off-Site  | Nephrology |   Marzena Moser  |                   |
2020   | Visit     |            | DO ETIENNE  [address]      |                   |
|        |           |            | [address]      |                   |
|        |           |            | [place]      |                   |
|        |           |            | [address]  [phone]  |                   |
|        |           |            |  [phone] (Fax)  |                   |
+--------+-----------+------------+----------------------+-------------------+
 documented as of this encounter
 
 Procedures
 
 
+----------------------+--------+-------------+----------------------+----------------------
+
| Procedure Name       | Priori | Date/Time   | Associated Diagnosis | Comments             
|
|                      | ty     |             |                      |                      
|
+----------------------+--------+-------------+----------------------+----------------------
+
| NM NUCLEAR STRESS    | Routin | 2014  |   Other chest pain   |   Results for this   
|
| TEST (PHARMACOLOGIC  | e      | 11:56 AM    | CAD (coronary artery | procedure are in the 
|
| - VASODILATOR)       |        | PDT         |  disease)  Pre-op    |  results section.    
|
|                      |        |             | evaluation           |                      
|
+----------------------+--------+-------------+----------------------+----------------------
+
 documented in this encounter
 
 Visit Diagnoses
 Not on filedocumented in this encounter
 
 Administered Medications
 
 
+-----------------------------------+--------+----------+----------+------+------+
| Medication Order                  | MAR    | Action   | Dose     | Rate | Site |
|                                   | Action | Date     |          |      |      |
+-----------------------------------+--------+----------+----------+------+------+
|   technetium TC-99M sestamibi     | Given  | 20 | 30       |      |      |
| (CARDIOLITE) injection 30         |        | 14 11:55 | -millicu |      |      |
| millicurie  30 -millicurie,       |        |  AM PDT  | perla      |      |      |
| Intravenous, ONCE PRN, Other,     |        |          |          |      |      |
| Starting Fri 14 at 1155, For |        |          |          |      |      |
 
|  1 dose, Nuclear Medicine         |        |          |          |      |      |
+-----------------------------------+--------+----------+----------+------+------+
 
 
 
+---+---+
|   |   |
+---+---+
 documented in this encounter

## 2020-01-08 NOTE — XMS
Encounter Summary
  Created on: 2020
 
 Viviana Ricci
 External Reference #: [id number]
 : 47
 Sex: Female
 
 Demographics
 
 
+-----------------------+----------------------+
| Address               | [address]      |
|                       | [place]  [address] |
+-----------------------+----------------------+
| Home Phone            | [phone]      |
+-----------------------+----------------------+
| Preferred Language    | Unknown              |
+-----------------------+----------------------+
| Marital Status        | Single               |
+-----------------------+----------------------+
| Orthodoxy Affiliation | 1009                 |
+-----------------------+----------------------+
| Race                  | Unknown              |
+-----------------------+----------------------+
| Ethnic Group          | Unknown              |
+-----------------------+----------------------+
 
 
 Author
 
 
+--------------+--------------------------------------------+
| Author       | [facility]  |
|              | and [facility]                                |
+--------------+--------------------------------------------+
| Organization | [facility]  |
|              | and [facility]                                |
+--------------+--------------------------------------------+
| Address      | Unknown                                    |
+--------------+--------------------------------------------+
| Phone        | Unavailable                                |
+--------------+--------------------------------------------+
 
 
 
 Support
 
 
+----------------+--------------+---------------------+-----------------+
| Name           | Relationship | Address             | Phone           |
+----------------+--------------+---------------------+-----------------+
| Ada/Ed Radhames | ECON         | [address]              | [phone] |
|                |              | [place]  |                 |
|                |              |  [address]              |                 |
+----------------+--------------+---------------------+-----------------+
 
 
 
 
 Care Team Providers
 
 
+------------------------+------+-----------------+
| Care Team Member Name  | Role | Phone           |
+------------------------+------+-----------------+
| Marzena Moser DO | PCP  | [phone] |
+------------------------+------+-----------------+
 
 
 
 Encounter Details
 
 
+--------+----------+---------------------+----------------------+-------------+
| Date   | Type     | Department          | Care Team            | Description |
+--------+----------+---------------------+----------------------+-------------+
| / | Abstract |   [facility]         |   Marzena Moser  |             |
| 2019   |          | NEPHROLOGY  [address]   | DO ETIENNE  [address]      |             |
|        |          | [address]   | [address]      |             |
|        |          | [place], [place]     | [place], [place]      |             |
|        |          | [address]          | [address]  [phone]  |             |
|        |          | [phone]        |  [phone] (Fax)  |             |
+--------+----------+---------------------+----------------------+-------------+
 
 
 
 Social History
 
 
+--------------+-------+-----------+--------+------+
| Tobacco Use  | Types | Packs/Day | Years  | Date |
|              |       |           | Used   |      |
+--------------+-------+-----------+--------+------+
| Never Smoker |       |           |        |      |
+--------------+-------+-----------+--------+------+
 
 
 
+---------------------+---+---+---+
| Smokeless Tobacco:  |   |   |   |
| Never Used          |   |   |   |
+---------------------+---+---+---+
 
 
 
+---------------------------------------------------------------+
| Comments: some second hand smoke exposure, but fairly minimal |
+---------------------------------------------------------------+
 
 
 
+-------------+-------------+---------+----------+
| Alcohol Use | Drinks/Week | oz/Week | Comments |
+-------------+-------------+---------+----------+
| No          |             |         |          |
+-------------+-------------+---------+----------+
 
 
 
 
+------------------+---------------+
| Sex Assigned at  | Date Recorded |
| Birth            |               |
+------------------+---------------+
| Not on file      |               |
+------------------+---------------+
 
 
 
+----------------+-------------+-------------+
| Job Start Date | Occupation  | Industry    |
+----------------+-------------+-------------+
| Not on file    | Not on file | Not on file |
+----------------+-------------+-------------+
 
 
 
+----------------+--------------+------------+
| Travel History | Travel Start | Travel End |
+----------------+--------------+------------+
 
 
 
+-------------------------------------+
| No recent travel history available. |
+-------------------------------------+
 documented as of this encounter
 
 Progress Notes
 Juju Glass RN - 2019  9:16 AM PDTTacrolimus level 5.1, at goal. Viviana notified t
o continue with 1 mg every 12 hours, she verbalized understanding.Electronically signed by PITA Glass RN at 2019  9:23 AM PDTdocumented in this encounter
 
 Plan of Treatment
 
 
+--------+-----------+------------+----------------------+-------------------+
| Date   | Type      | Specialty  | Care Team            | Description       |
+--------+-----------+------------+----------------------+-------------------+
| / | Office    | Cardiology |   Tonia Wilson,    |                   |
|    | Visit     |            | ARNJESSICA  [address]   |                   |
|        |           |            | St [place], [place]  |                   |
|        |           |            | [address]  [phone]  |                   |
|        |           |            |  [phone] (Fax)  |                   |
+--------+-----------+------------+----------------------+-------------------+
| / | Hospital  | Radiology  |   Popeye Townsend, |                   |
|    | Encounter |            |  MD  [address] |                   |
|        |           |            |  St. [place],   |                   |
|        |           |            | [place] [address]             |                   |
|        |           |            | [phone]         |                   |
|        |           |            | [phone] (Fax)   |                   |
+--------+-----------+------------+----------------------+-------------------+
| / | Surgery   | Radiology  |   Popeye Townsend, | CV EP PPM SYSTEM  |
|    |           |            |  MD  [address] | IMPLANT           |
|        |           |            |  [place],   |                   |
|        |           |            | [place] [address]             |                   |
|        |           |            | [phone]         |                   |
|        |           |            | [phone] (Fax)   |                   |
+--------+-----------+------------+----------------------+-------------------+
 
| 02/10/ | Clinical  | Cardiology |                      |                   |
|    | Support   |            |                      |                   |
+--------+-----------+------------+----------------------+-------------------+
| / | Office    | Cardiology |   Tonia Wilson,    |                   |
|    | Visit     |            | [facility]  [address]   |                   |
|        |           |            | [place]  |                   |
|        |           |            | [address]  [phone]  |                   |
|        |           |            |  [phone] (Fax)  |                   |
+--------+-----------+------------+----------------------+-------------------+
| / | Off-Site  | Nephrology |   Marzena Moser  |                   |
|    | Visit     |            | DO ETIENNE  [address] [place] [address]      |                   |
|        |           |            | [address]      |                   |
|        |           |            | [place]      |                   |
|        |           |            | [address]  [phone]  |                   |
|        |           |            |  [phone] (Fax)  |                   |
+--------+-----------+------------+----------------------+-------------------+
 documented as of this encounter
 
 Procedures
 
 
+--------------------+--------+------------+----------------------+----------------------+
| Procedure Name     | Priori | Date/Time  | Associated Diagnosis | Comments             |
|                    | ty     |            |                      |                      |
+--------------------+--------+------------+----------------------+----------------------+
| TACROLIMUS TROUGH  | Routin | 2019 |                      |   Results for this   |
| LC-MS/MS           | e      |            |                      | procedure are in the |
|                    |        |            |                      |  results section.    |
+--------------------+--------+------------+----------------------+----------------------+
 documented in this encounter
 
 Results
 Tacrolimus Trough, LC-MS/MS (2019)
 
+-------------+-------+-----------+------------+--------------+
| Component   | Value | Ref Range | Performed  | Pathologist  |
|             |       |           | At         | Signature    |
+-------------+-------+-----------+------------+--------------+
| Tacrolimus, | 5.1   |           |            |              |
|  LC-MS/MS,  |       |           |            |              |
| External    |       |           |            |              |
+-------------+-------+-----------+------------+--------------+
 
 
 
+----------+
| Specimen |
+----------+
| Blood    |
+----------+
 documented in this encounter
 
 Visit Diagnoses
 Not on filedocumented in this encounter

## 2020-01-08 NOTE — XMS
Encounter Summary
  Created on: 2020
 
 Viviana Ricci
 External Reference #: [id number]
 : 47
 Sex: Female
 
 Demographics
 
 
+-----------------------+----------------------+
| Address               | [address]      |
|                       | [place]  [address] |
+-----------------------+----------------------+
| Home Phone            | [phone]      |
+-----------------------+----------------------+
| Preferred Language    | Unknown              |
+-----------------------+----------------------+
| Marital Status        | Single               |
+-----------------------+----------------------+
| Restoration Affiliation | 1009                 |
+-----------------------+----------------------+
| Race                  | Unknown              |
+-----------------------+----------------------+
| Ethnic Group          | Unknown              |
+-----------------------+----------------------+
 
 
 Author
 
 
+--------------+--------------------------------------------+
| Author       | [facility]  |
|              | and [place]                                |
+--------------+--------------------------------------------+
| Organization | [facility]  |
|              | and [place]                                |
+--------------+--------------------------------------------+
| Address      | Unknown                                    |
+--------------+--------------------------------------------+
| Phone        | Unavailable                                |
+--------------+--------------------------------------------+
 
 
 
 Support
 
 
+----------------+--------------+---------------------+-----------------+
| Name           | Relationship | Address             | Phone           |
+----------------+--------------+---------------------+-----------------+
| Ada/Ed Radhames | ECON         | [address]              | [phone] |
|                |              | [place]  |                 |
|                |              |  [address]              |                 |
+----------------+--------------+---------------------+-----------------+
 
 
 
 
 Care Team Providers
 
 
+-----------------------+------+-------------+
| Care Team Member Name | Role | Phone       |
+-----------------------+------+-------------+
 PCP  | Unavailable |
+-----------------------+------+-------------+
 
 
 
 Reason for Visit
 
 
+-------------------+----------+
| Reason            | Comments |
+-------------------+----------+
| Medication Refill |          |
+-------------------+----------+
 
 
 
 Encounter Details
 
 
+--------+--------+---------------------+----------------------+-------------------+
| Date   | Type   | Department          | Care Team            | Description       |
+--------+--------+---------------------+----------------------+-------------------+
| 10/01/ | Refill |   [facility]         |   Marzena Moser  | Medication Refill |
|    |        | NEPHROLOGY  [address]   | M, DO  [address]      |                   |
|        |        | [address]   | [address]      |                   |
|        |        | [place], [place]     | [place], [place]      |                   |
|        |        | [address]          | [address]  [phone]  |                   |
|        |        | [phone]        |  [phone] (Fax)  |                   |
+--------+--------+---------------------+----------------------+-------------------+
 
 
 
 Social History
 
 
+--------------+-------+-----------+--------+------+
| Tobacco Use  | Types | Packs/Day | Years  | Date |
|              |       |           | Used   |      |
+--------------+-------+-----------+--------+------+
| Never Smoker |       |           |        |      |
+--------------+-------+-----------+--------+------+
 
 
 
+---------------------+---+---+---+
| Smokeless Tobacco:  |   |   |   |
| Never Used          |   |   |   |
+---------------------+---+---+---+
 
 
 
+---------------------------------------------------------------+
| Comments: some second hand smoke exposure, but fairly minimal |
 
+---------------------------------------------------------------+
 
 
 
+-------------+-------------+---------+----------+
| Alcohol Use | Drinks/Week | oz/Week | Comments |
+-------------+-------------+---------+----------+
| No          |             |         |          |
+-------------+-------------+---------+----------+
 
 
 
+------------------+---------------+
| Sex Assigned at  | Date Recorded |
| Birth            |               |
+------------------+---------------+
| Not on file      |               |
+------------------+---------------+
 
 
 
+----------------+-------------+-------------+
| Job Start Date | Occupation  | Industry    |
+----------------+-------------+-------------+
| Not on file    | Not on file | Not on file |
+----------------+-------------+-------------+
 
 
 
+----------------+--------------+------------+
| Travel History | Travel Start | Travel End |
+----------------+--------------+------------+
 
 
 
+-------------------------------------+
| No recent travel history available. |
+-------------------------------------+
 documented as of this encounter
 
 Plan of Treatment
 
 
+--------+-----------+------------+----------------------+-------------------+
| Date   | Type      | Specialty  | Care Team            | Description       |
+--------+-----------+------------+----------------------+-------------------+
| / | Office    | Cardiology |   HellalfredoTonia,    |                   |
|    | Visit     |            | ARNP  [address]   |                   |
|        |           |            | St  [place], [place]  |                   |
|        |           |            | [address]  [phone]  |                   |
|        |           |            |  [phone] (Fax)  |                   |
+--------+-----------+------------+----------------------+-------------------+
| / | Hospital  | Radiology  |   Popeye Townsend, |                   |
|    | Encounter |            |  MD  [address] |                   |
|        |           |            |  St.  [place],   |                   |
|        |           |            | [place] [address]             |                   |
|        |           |            | [phone]         |                   |
|        |           |            | [phone] (Fax)   |                   |
+--------+-----------+------------+----------------------+-------------------+
| / | Surgery   | Radiology  |   Popeye Townsend, | CV EP PPM SYSTEM  |
 
|    |           |            |  MD  [address] | IMPLANT           |
|        |           |            |  St.  [place],   |                   |
|        |           |            | [place] [address]             |                   |
|        |           |            | [phone]         |                   |
|        |           |            | [phone] (Fax)   |                   |
+--------+-----------+------------+----------------------+-------------------+
| 02/10/ | Clinical  | Cardiology |                      |                   |
|    | Support   |            |                      |                   |
+--------+-----------+------------+----------------------+-------------------+
| / | Office    | Cardiology |   Tonia Wilson,    |                   |
|    | Visit     |            | ARNP  [address]   |                   |
|        |           |            | [place]  |                   |
|        |           |            | [address]  [phone]  |                   |
|        |           |            |  [phone] (Fax)  |                   |
+--------+-----------+------------+----------------------+-------------------+
| / | Off-Site  | Nephrology |   Marzena Moser  |                   |
|    | Visit     |            | DO ETIENNE  [address] [place] [address]      |                   |
|        |           |            | [address]      |                   |
|        |           |            | [place]      |                   |
|        |           |            | [address]  [phone]  |                   |
|        |           |            |  [phone] (Fax)  |                   |
+--------+-----------+------------+----------------------+-------------------+
 documented as of this encounter
 
 Visit Diagnoses
 Not on filedocumented in this encounter

## 2020-01-08 NOTE — XMS
Encounter Summary
  Created on: 2020
 
 Viviana Ricci
 External Reference #: [id number]
 : 47
 Sex: Female
 
 Demographics
 
 
+-----------------------+----------------------+
| Address               | [address]      |
|                       | [place]  [address] |
+-----------------------+----------------------+
| Home Phone            | [phone]      |
+-----------------------+----------------------+
| Preferred Language    | Unknown              |
+-----------------------+----------------------+
| Marital Status        | Single               |
+-----------------------+----------------------+
| Druze Affiliation | 1009                 |
+-----------------------+----------------------+
| Race                  | Unknown              |
+-----------------------+----------------------+
| Ethnic Group          | Unknown              |
+-----------------------+----------------------+
 
 
 Author
 
 
+--------------+--------------------------------------------+
| Author       | [facility]  |
|              | and [place]                                |
+--------------+--------------------------------------------+
| Organization | [facility]  |
|              | and [place]                                |
+--------------+--------------------------------------------+
| Address      | Unknown                                    |
+--------------+--------------------------------------------+
| Phone        | Unavailable                                |
+--------------+--------------------------------------------+
 
 
 
 Support
 
 
+----------------+--------------+---------------------+-----------------+
| Name           | Relationship | Address             | Phone           |
+----------------+--------------+---------------------+-----------------+
| Ada/Ed Radhames | ECON         | [address]              | [phone] |
|                |              | [place]  |                 |
|                |              |  [address]              |                 |
+----------------+--------------+---------------------+-----------------+
 
 
 
 
 Care Team Providers
 
 
+-----------------------+------+-------------+
| Care Team Member Name | Role | Phone       |
+-----------------------+------+-------------+
 PCP  | Unavailable |
+-----------------------+------+-------------+
 
 
 
 Encounter Details
 
 
+--------+----------+---------------------+----------------------+-------------+
| Date   | Type     | Department          | Care Team            | Description |
+--------+----------+---------------------+----------------------+-------------+
| / | Abstract |   [facility] [place]         |   Marzena Moser  |             |
|    |          | NEPHROLOGY  [address]   | M, DO  [address]      |             |
|        |          | [address]   | [address]      |             |
|        |          | [place], [place]     | [place]      |             |
|        |          | [address]          | [address]  [phone]  |             |
|        |          | [phone]        |  [phone] (Fax)  |             |
+--------+----------+---------------------+----------------------+-------------+
 
 
 
 Social History
 
 
+--------------+-------+-----------+--------+------+
| Tobacco Use  | Types | Packs/Day | Years  | Date |
|              |       |           | Used   |      |
+--------------+-------+-----------+--------+------+
| Never Smoker |       |           |        |      |
+--------------+-------+-----------+--------+------+
 
 
 
+---------------------+---+---+---+
| Smokeless Tobacco:  |   |   |   |
| Never Used          |   |   |   |
+---------------------+---+---+---+
 
 
 
+---------------------------------------------------------------+
| Comments: some second hand smoke exposure, but fairly minimal |
+---------------------------------------------------------------+
 
 
 
+-------------+-------------+---------+----------+
| Alcohol Use | Drinks/Week | oz/Week | Comments |
+-------------+-------------+---------+----------+
| No          |             |         |          |
+-------------+-------------+---------+----------+
 
 
 
 
+------------------+---------------+
| Sex Assigned at  | Date Recorded |
| Birth            |               |
+------------------+---------------+
| Not on file      |               |
+------------------+---------------+
 
 
 
+----------------+-------------+-------------+
| Job Start Date | Occupation  | Industry    |
+----------------+-------------+-------------+
| Not on file    | Not on file | Not on file |
+----------------+-------------+-------------+
 
 
 
+----------------+--------------+------------+
| Travel History | Travel Start | Travel End |
+----------------+--------------+------------+
 
 
 
+-------------------------------------+
| No recent travel history available. |
+-------------------------------------+
 documented as of this encounter
 
 Plan of Treatment
 
 
+--------+-----------+------------+----------------------+-------------------+
| Date   | Type      | Specialty  | Care Team            | Description       |
+--------+-----------+------------+----------------------+-------------------+
| / | Office    | Cardiology |   Tonia Wilson,    |                   |
|    | Visit     |            | ARNJESSICA  [address]   |                   |
|        |           |            | [place]  |                   |
|        |           |            | [address]  [phone]  |                   |
|        |           |            |  [phone] (Fax)  |                   |
+--------+-----------+------------+----------------------+-------------------+
| / | Hospital  | Radiology  |   Popeye Townsend, |                   |
|    | Encounter |            |  MD  [address] |                   |
|        |           |            |  St.  [place],   |                   |
|        |           |            | [place] [address]             |                   |
|        |           |            | [phone]         |                   |
|        |           |            | [phone] (Fax)   |                   |
+--------+-----------+------------+----------------------+-------------------+
| / | Surgery   | Radiology  |   Popeye Townsend, | CV EP PPM SYSTEM  |
|    |           |            |  MD  [address] | IMPLANT           |
|        |           |            |  St.  [place],   |                   |
|        |           |            | [place] [address]             |                   |
|        |           |            | [phone]         |                   |
|        |           |            | [phone] (Fax)   |                   |
+--------+-----------+------------+----------------------+-------------------+
| 02/10/ | Clinical  | Cardiology |                      |                   |
|    | Support   |            |                      |                   |
+--------+-----------+------------+----------------------+-------------------+
| / | Office    | Cardiology |   Tonia Wilson,    |                   |
|    | Visit     |            | ARNP  [address]   |                   |
 
|        |           |            | St  [place], [place]  |                   |
|        |           |            | [address]  [phone]  |                   |
|        |           |            |  [phone] (Fax)  |                   |
+--------+-----------+------------+----------------------+-------------------+
| / | Off-Site  | Nephrology |   Marzena Moser  |                   |
| 2020   | Visit     |            | DO ETIENNE  [address] [place] [address]      |                   |
|        |           |            | [address]      |                   |
|        |           |            | [place]      |                   |
|        |           |            | [address]  [phone]  |                   |
|        |           |            |  [phone] (Fax)  |                   |
+--------+-----------+------------+----------------------+-------------------+
 documented as of this encounter
 
 Procedures
 
 
+----------------------+--------+------------+----------------------+----------------------+
| Procedure Name       | Priori | Date/Time  | Associated Diagnosis | Comments             |
|                      | ty     |            |                      |                      |
+----------------------+--------+------------+----------------------+----------------------+
| EXTERNAL LAB:        | Routin | 2016 |                      |   Results for this   |
| TACROLIMUS LEVEL,    | e      |            |                      | procedure are in the |
| CMIA                 |        |            |                      |  results section.    |
+----------------------+--------+------------+----------------------+----------------------+
| EXTERNAL LAB: BK     | Routin | 2016 |                      |   Results for this   |
| VIRUS, NAAT, SERUM,  | e      |            |                      | procedure are in the |
| QUANT                |        |            |                      |  results section.    |
+----------------------+--------+------------+----------------------+----------------------+
 documented in this encounter
 
 Results
 External Lab: Bk Virus, NAAT Serum, Quant (2016)
 
+-------------+--------------+-----------+------------+--------------+
| Component   | Value        | Ref Range | Performed  | Pathologist  |
|             |              |           | At         | Signature    |
+-------------+--------------+-----------+------------+--------------+
| BKV, Serum, | not detected |           |            |              |
|  PCR,       |              |           |            |              |
| External    |              |           |            |              |
+-------------+--------------+-----------+------------+--------------+
 
 
 
+----------+
| Specimen |
+----------+
|          |
+----------+
 External Lab: Tacrolimus Level, CMIA (2016)
 
+-------------+-------+-----------+------------+--------------+
| Component   | Value | Ref Range | Performed  | Pathologist  |
|             |       |           | At         | Signature    |
+-------------+-------+-----------+------------+--------------+
| Tacrolimus, | 5.2   |           |            |              |
|  CMIA,      |       |           |            |              |
| External    |       |           |            |              |
+-------------+-------+-----------+------------+--------------+
 
 
 
 
+----------+
| Specimen |
+----------+
|          |
+----------+
 documented in this encounter
 
 Visit Diagnoses
 Not on filedocumented in this encounter

## 2020-01-08 NOTE — XMS
Encounter Summary
  Created on: 2020
 
 Viviana Ricci
 External Reference #: [id number]
 : 47
 Sex: Female
 
 Demographics
 
 
+-----------------------+----------------------+
| Address               | [address]      |
|                       | [place]  [address] |
+-----------------------+----------------------+
| Home Phone            | [phone]      |
+-----------------------+----------------------+
| Preferred Language    | Unknown              |
+-----------------------+----------------------+
| Marital Status        | Single               |
+-----------------------+----------------------+
| Holiness Affiliation | 1009                 |
+-----------------------+----------------------+
| Race                  | Unknown              |
+-----------------------+----------------------+
| Ethnic Group          | Unknown              |
+-----------------------+----------------------+
 
 
 Author
 
 
+--------------+--------------------------------------------+
| Author       | [facility]  |
|              | and [place]                                |
+--------------+--------------------------------------------+
| Organization | [facility]  |
|              | and [place]                                |
+--------------+--------------------------------------------+
| Address      | Unknown                                    |
+--------------+--------------------------------------------+
| Phone        | Unavailable                                |
+--------------+--------------------------------------------+
 
 
 
 Support
 
 
+----------------+--------------+---------------------+-----------------+
| Name           | Relationship | Address             | Phone           |
+----------------+--------------+---------------------+-----------------+
| Ada/Ed Radhames | ECON         | [address]              | [phone] |
|                |              | [place]  |                 |
|                |              |  [address]              |                 |
+----------------+--------------+---------------------+-----------------+
 
 
 
 
 Care Team Providers
 
 
+-----------------------+------+-------------+
| Care Team Member Name | Role | Phone       |
+-----------------------+------+-------------+
 PCP  | Unavailable |
+-----------------------+------+-------------+
 
 
 
 Reason for Visit
 
 
+-------------------+----------+
| Reason            | Comments |
+-------------------+----------+
| Medication Refill |          |
+-------------------+----------+
 
 
 
 Encounter Details
 
 
+--------+--------+---------------------+----------------------+-------------------+
| Date   | Type   | Department          | Care Team            | Description       |
+--------+--------+---------------------+----------------------+-------------------+
| / | Refill |   [place]         |   Marzena Moser  | Medication Refill |
|    |        | NEPHROLOGY  [address]   | M, DO  [address]      |                   |
|        |        | [address]   | [address]      |                   |
|        |        | [place], [place]     | [place], [place]      |                   |
|        |        | [address]          | [address]  [phone]  |                   |
|        |        | [phone]        |  [phone] (Fax)  |                   |
+--------+--------+---------------------+----------------------+-------------------+
 
 
 
 Social History
 
 
+--------------+-------+-----------+--------+------+
| Tobacco Use  | Types | Packs/Day | Years  | Date |
|              |       |           | Used   |      |
+--------------+-------+-----------+--------+------+
| Never Smoker |       |           |        |      |
+--------------+-------+-----------+--------+------+
 
 
 
+---------------------+---+---+---+
| Smokeless Tobacco:  |   |   |   |
| Never Used          |   |   |   |
+---------------------+---+---+---+
 
 
 
+---------------------------------------------------------------+
| Comments: some second hand smoke exposure, but fairly minimal |
 
+---------------------------------------------------------------+
 
 
 
+-------------+-------------+---------+----------+
| Alcohol Use | Drinks/Week | oz/Week | Comments |
+-------------+-------------+---------+----------+
| No          |             |         |          |
+-------------+-------------+---------+----------+
 
 
 
+------------------+---------------+
| Sex Assigned at  | Date Recorded |
| Birth            |               |
+------------------+---------------+
| Not on file      |               |
+------------------+---------------+
 
 
 
+----------------+-------------+-------------+
| Job Start Date | Occupation  | Industry    |
+----------------+-------------+-------------+
| Not on file    | Not on file | Not on file |
+----------------+-------------+-------------+
 
 
 
+----------------+--------------+------------+
| Travel History | Travel Start | Travel End |
+----------------+--------------+------------+
 
 
 
+-------------------------------------+
| No recent travel history available. |
+-------------------------------------+
 documented as of this encounter
 
 Plan of Treatment
 
 
+--------+-----------+------------+----------------------+-------------------+
| Date   | Type      | Specialty  | Care Team            | Description       |
+--------+-----------+------------+----------------------+-------------------+
| / | Office    | Cardiology |   HelllafredoTonia,    |                   |
|    | Visit     |            | ARNP  [address]   |                   |
|        |           |            | St  [place], [place]  |                   |
|        |           |            | [address]  [phone]  |                   |
|        |           |            |  [phone] (Fax)  |                   |
+--------+-----------+------------+----------------------+-------------------+
| / | Hospital  | Radiology  |   Popeye Townsend, |                   |
|    | Encounter |            |  MD  [address] |                   |
|        |           |            |  St.  [place],   |                   |
|        |           |            | [place] [address]             |                   |
|        |           |            | [phone]         |                   |
|        |           |            | [phone] (Fax)   |                   |
+--------+-----------+------------+----------------------+-------------------+
| / | Surgery   | Radiology  |   Popeye Townsend, | CV EP PPM SYSTEM  |
 
|    |           |            |  MD  [address] | IMPLANT           |
|        |           |            |  St.  [place],   |                   |
|        |           |            | [place] [address]             |                   |
|        |           |            | [phone]         |                   |
|        |           |            | [phone] (Fax)   |                   |
+--------+-----------+------------+----------------------+-------------------+
| 02/10/ | Clinical  | Cardiology |                      |                   |
|    | Support   |            |                      |                   |
+--------+-----------+------------+----------------------+-------------------+
| / | Office    | Cardiology |   Tonia Wilson,    |                   |
|    | Visit     |            | ARNP  [address]   |                   |
|        |           |            | [place]  |                   |
|        |           |            | [address]  [phone]  |                   |
|        |           |            |  [phone] (Fax)  |                   |
+--------+-----------+------------+----------------------+-------------------+
| / | Off-Site  | Nephrology |   Marzena Moser  |                   |
|    | Visit     |            | DO ETIENNE  [address] [place] [address]      |                   |
|        |           |            | [address]      |                   |
|        |           |            | [place]      |                   |
|        |           |            | [address]  [phone]  |                   |
|        |           |            |  [phone] (Fax)  |                   |
+--------+-----------+------------+----------------------+-------------------+
 documented as of this encounter
 
 Visit Diagnoses
 Not on filedocumented in this encounter

## 2020-01-08 NOTE — XMS
Encounter Summary
  Created on: 2020
 
 Viviana Ricci
 External Reference #: [id number]
 : 47
 Sex: Female
 
 Demographics
 
 
+-----------------------+----------------------+
| Address               | [address]      |
|                       | [place]  [address] |
+-----------------------+----------------------+
| Home Phone            | [phone]      |
+-----------------------+----------------------+
| Preferred Language    | Unknown              |
+-----------------------+----------------------+
| Marital Status        | Single               |
+-----------------------+----------------------+
| Syn[employer] Affiliation | 1009                 |
+-----------------------+----------------------+
| Race                  | Unknown              |
+-----------------------+----------------------+
| Ethnic Group          | Unknown              |
+-----------------------+----------------------+
 
 
 Author
 
 
+--------------+--------------------------------------------+
| Author       | [facility]  |
|              | and [place]                                |
+--------------+--------------------------------------------+
| Organization | [facility]  |
|              | and [place]                                |
+--------------+--------------------------------------------+
| Address      | Unknown                                    |
+--------------+--------------------------------------------+
| Phone        | Unavailable                                |
+--------------+--------------------------------------------+
 
 
 
 Support
 
 
+----------------+--------------+---------------------+-----------------+
| Name           | Relationship | Address             | Phone           |
+----------------+--------------+---------------------+-----------------+
| Ada/Ed Radhames | ECON         | [address]              | [phone] |
|                |              | [place]  |                 |
|                |              |  [address]              |                 |
+----------------+--------------+---------------------+-----------------+
 
 
 
 
 Care Team Providers
 
 
+-----------------------+------+-------------+
| Care Team Member Name | Role | Phone       |
+-----------------------+------+-------------+
 PCP  | Unavailable |
+-----------------------+------+-------------+
 
 
 
 Encounter Details
 
 
+--------+-------------+---------------------+----------------------+-------------+
| Date   | Type        | Department          | Care Team            | Description |
+--------+-------------+---------------------+----------------------+-------------+
| / | Documentati |   [facility]         |   Marzena Moser  |             |
|    | on          | NEPHROLOGY  [address]   | M, DO  [address]      |             |
|        |             | [address]   | [address]      |             |
|        |             | [place]     | [place]      |             |
|        |             | [address]          | [address]  [phone]  |             |
|        |             | [phone]        |  [phone] (Fax)  |             |
+--------+-------------+---------------------+----------------------+-------------+
 
 
 
 Social History
 
 
+--------------+-------+-----------+--------+------+
| Tobacco Use  | Types | Packs/Day | Years  | Date |
|              |       |           | Used   |      |
+--------------+-------+-----------+--------+------+
| Never Smoker |       |           |        |      |
+--------------+-------+-----------+--------+------+
 
 
 
+---------------------+---+---+---+
| Smokeless Tobacco:  |   |   |   |
| Never Used          |   |   |   |
+---------------------+---+---+---+
 
 
 
+---------------------------------------------------------------+
| Comments: some second hand smoke exposure, but fairly minimal |
+---------------------------------------------------------------+
 
 
 
+-------------+-------------+---------+----------+
| Alcohol Use | Drinks/Week | oz/Week | Comments |
+-------------+-------------+---------+----------+
| No          |             |         |          |
+-------------+-------------+---------+----------+
 
 
 
 
+------------------+---------------+
| Sex Assigned at  | Date Recorded |
| Birth            |               |
+------------------+---------------+
| Not on file      |               |
+------------------+---------------+
 
 
 
+----------------+-------------+-------------+
| Job Start Date | Occupation  | Industry    |
+----------------+-------------+-------------+
| Not on file    | Not on file | Not on file |
+----------------+-------------+-------------+
 
 
 
+----------------+--------------+------------+
| Travel History | Travel Start | Travel End |
+----------------+--------------+------------+
 
 
 
+-------------------------------------+
| No recent travel history available. |
+-------------------------------------+
 documented as of this encounter
 
 Progress Notes
 Marzena Moser DO - 2015  3:16 PM PSTNEPHROLOGY
 Viviana had her standing order done. Her UA shows  WBC, with strongly (+) LE, and grew >
 100,00 klebsiella.  She denies fever or vomiting. Will start Cipro 250 mg, BID x 7 days , a
s she is on tacrolimus.  Will see her in Clinic next week.  
 
 Electronically signed by: Marzena Moser DO on 2015 at 15:18
 
 Electronically signed by Marzena Moser DO at 2015  3:21 PM PSTdocumented in thi
s encounter
 
 Plan of Treatment
 
 
+--------+-----------+------------+----------------------+-------------------+
| Date   | Type      | Specialty  | Care Team            | Description       |
+--------+-----------+------------+----------------------+-------------------+
| / | Office    | Cardiology |   Tonia Wilson,    |                   |
|    | Visit     |            | [address]   |                   |
|        |           |            | [place], [place]  |                   |
|        |           |            | [address]  [phone]  |                   |
|        |           |            |  [phone] (Fax)  |                   |
+--------+-----------+------------+----------------------+-------------------+
| / | Hospital  | Radiology  |   Popeye Townsend, |                   |
2020   | Encounter |            |  MD  [address] |                   |
|        |           |            |  [place],   |                   |
|        |           |            | [place] [address]             |                   |
|        |           |            | [phone]         |                   |
|        |           |            | [phone] (Fax)   |                   |
+--------+-----------+------------+----------------------+-------------------+
| / | Surgery   | Radiology  |   Popeye Townsend, | CV EP PPM SYSTEM  |
 
|    |           |            |  MD  [address] | IMPLANT           |
|        |           |            |  [place],   |                   |
|        |           |            | [place] [address]             |                   |
|        |           |            | [phone]         |                   |
|        |           |            | [phone] (Fax)   |                   |
+--------+-----------+------------+----------------------+-------------------+
| 02/10/ | Clinical  | Cardiology |                      |                   |
|    | Support   |            |                      |                   |
+--------+-----------+------------+----------------------+-------------------+
| / | Office    | Cardiology |   Tonia Wilson,    |                   |
|    | Visit     |            | BELKIS  [address]   |                   |
|        |           |            | [place]  |                   |
|        |           |            | [address]  [phone]  |                   |
|        |           |            |  [phone] (Fax)  |                   |
+--------+-----------+------------+----------------------+-------------------+
| / | Off-Site  | Nephrology |   Marzena Moser  |                   |
|    | Visit     |            | DO ETIENNE  [address] [place] [address]      |                   |
|        |           |            | [address]      |                   |
|        |           |            | [place]      |                   |
|        |           |            | [address]  [phone]  |                   |
|        |           |            |  [phone] (Fax)  |                   |
+--------+-----------+------------+----------------------+-------------------+
 documented as of this encounter
 
 Visit Diagnoses
 
 
+------------------------------------------------------------------------------------------+
| Diagnosis                                                                                |
+------------------------------------------------------------------------------------------+
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
| stage IV, or unspecified(403.90) - Primary  Unspecified hypertensive kidney disease with |
|  chronic kidney disease stage I through stage IV, or unspecified                         |
+------------------------------------------------------------------------------------------+
|   UTI (lower urinary tract infection)  Urinary tract infection, site not specified       |
+------------------------------------------------------------------------------------------+
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
+------------------------------------------------------------------------------------------+
 documented in this encounter

## 2020-01-08 NOTE — XMS
Encounter Summary
  Created on: 2020
 
 Viviana Ricci
 External Reference #: [id number]
 : 47
 Sex: Female
 
 Demographics
 
 
+-----------------------+----------------------+
| Address               | [address]      |
|                       | [place]  [address] |
+-----------------------+----------------------+
| Home Phone            | [phone]      |
+-----------------------+----------------------+
| Preferred Language    | Unknown              |
+-----------------------+----------------------+
| Marital Status        | Single               |
+-----------------------+----------------------+
| [employer] Affiliation | 1009                 |
+-----------------------+----------------------+
| Race                  | Unknown              |
+-----------------------+----------------------+
| Ethnic Group          | Unknown              |
+-----------------------+----------------------+
 
 
 Author
 
 
+--------------+--------------------------------------------+
| Author       | [facility]  |
|              | and [place]                                |
+--------------+--------------------------------------------+
| Organization | [facility]  |
|              | and [place]                                |
+--------------+--------------------------------------------+
| Address      | Unknown                                    |
+--------------+--------------------------------------------+
| Phone        | Unavailable                                |
+--------------+--------------------------------------------+
 
 
 
 Support
 
 
+----------------+--------------+---------------------+-----------------+
| Name           | Relationship | Address             | Phone           |
+----------------+--------------+---------------------+-----------------+
| Ada/Ed Radhames | ECON         | [address]              | [phone] |
|                |              | [place]  |                 |
|                |              |  [address]              |                 |
+----------------+--------------+---------------------+-----------------+
 
 
 
 
 Care Team Providers
 
 
+-----------------------+------+-------------+
| Care Team Member Name | Role | Phone       |
+-----------------------+------+-------------+
 PCP  | Unavailable |
+-----------------------+------+-------------+
 
 
 
 Reason for Visit
 
 
+-------------------+----------+
| Reason            | Comments |
+-------------------+----------+
| Medication Refill |          |
+-------------------+----------+
 
 
 
 Encounter Details
 
 
+--------+--------+---------------------+----------------------+-------------------+
| Date   | Type   | Department          | Care Team            | Description       |
+--------+--------+---------------------+----------------------+-------------------+
| / | Refill |   [facility]         |   Marzena Moser  | Medication Refill |
|    |        | NEPHROLOGY  [address]   | M, DO  [address]      |                   |
|        |        | [address]   | [address]      |                   |
|        |        | [place], [place]     | [place], [place]      |                   |
|        |        | [address]          | [address]  [phone]  |                   |
|        |        | [phone]        |  [phone] (Fax)  |                   |
+--------+--------+---------------------+----------------------+-------------------+
 
 
 
 Social History
 
 
+--------------+-------+-----------+--------+------+
| Tobacco Use  | Types | Packs/Day | Years  | Date |
|              |       |           | Used   |      |
+--------------+-------+-----------+--------+------+
| Never Smoker |       |           |        |      |
+--------------+-------+-----------+--------+------+
 
 
 
+---------------------+---+---+---+
| Smokeless Tobacco:  |   |   |   |
| Never Used          |   |   |   |
+---------------------+---+---+---+
 
 
 
+---------------------------------------------------------------+
| Comments: some second hand smoke exposure, but fairly minimal |
 
+---------------------------------------------------------------+
 
 
 
+-------------+-------------+---------+----------+
| Alcohol Use | Drinks/Week | oz/Week | Comments |
+-------------+-------------+---------+----------+
| No          |             |         |          |
+-------------+-------------+---------+----------+
 
 
 
+------------------+---------------+
| Sex Assigned at  | Date Recorded |
| Birth            |               |
+------------------+---------------+
| Not on file      |               |
+------------------+---------------+
 
 
 
+----------------+-------------+-------------+
| Job Start Date | Occupation  | Industry    |
+----------------+-------------+-------------+
| Not on file    | Not on file | Not on file |
+----------------+-------------+-------------+
 
 
 
+----------------+--------------+------------+
| Travel History | Travel Start | Travel End |
+----------------+--------------+------------+
 
 
 
+-------------------------------------+
| No recent travel history available. |
+-------------------------------------+
 documented as of this encounter
 
 Plan of Treatment
 
 
+--------+-----------+------------+----------------------+-------------------+
| Date   | Type      | Specialty  | Care Team            | Description       |
+--------+-----------+------------+----------------------+-------------------+
| / | Office    | Cardiology |   HellalfredoTonia,    |                   |
|    | Visit     |            | ARNP  [address]   |                   |
|        |           |            | St  [place], [place]  |                   |
|        |           |            | [address]  [phone]  |                   |
|        |           |            |  [phone] (Fax)  |                   |
+--------+-----------+------------+----------------------+-------------------+
| / | Hospital  | Radiology  |   Popeye Townsend, |                   |
|    | Encounter |            |  MD  [address] |                   |
|        |           |            |  St.  [place],   |                   |
|        |           |            | [place] [address]             |                   |
|        |           |            | [phone]         |                   |
|        |           |            | [phone] (Fax)   |                   |
+--------+-----------+------------+----------------------+-------------------+
| / | Surgery   | Radiology  |   Popeye Townsend, | CV EP PPM SYSTEM  |
 
|    |           |            |  MD  [address] | IMPLANT           |
|        |           |            |  St.  [place],   |                   |
|        |           |            | [place] [address]             |                   |
|        |           |            | [phone]         |                   |
|        |           |            | [phone] (Fax)   |                   |
+--------+-----------+------------+----------------------+-------------------+
| 02/10/ | Clinical  | Cardiology |                      |                   |
|    | Support   |            |                      |                   |
+--------+-----------+------------+----------------------+-------------------+
| / | Office    | Cardiology |   Tonia Wilson,    |                   |
|    | Visit     |            | ARNP  [address]   |                   |
|        |           |            | [place]  |                   |
|        |           |            | [address]  [phone]  |                   |
|        |           |            |  [phone] (Fax)  |                   |
+--------+-----------+------------+----------------------+-------------------+
| / | Off-Site  | Nephrology |   Marzena Moser  |                   |
|    | Visit     |            | DO ETIENNE  [address] [place] [address]      |                   |
|        |           |            | [address]      |                   |
|        |           |            | [place]      |                   |
|        |           |            | [address]  [phone]  |                   |
|        |           |            |  [phone] (Fax)  |                   |
+--------+-----------+------------+----------------------+-------------------+
 documented as of this encounter
 
 Visit Diagnoses
 Not on filedocumented in this encounter

## 2020-01-08 NOTE — XMS
Encounter Summary
  Created on: 2020
 
 Viviana Ricci
 External Reference #: [id number]
 : 47
 Sex: Female
 
 Demographics
 
 
+-----------------------+----------------------+
| Address               | [address]      |
|                       | [place]  [address] |
+-----------------------+----------------------+
| Home Phone            | [phone]      |
+-----------------------+----------------------+
| Preferred Language    | Unknown              |
+-----------------------+----------------------+
| Marital Status        | Single               |
+-----------------------+----------------------+
| Yazdanism Affiliation | [id number]                 |
+-----------------------+----------------------+
| Race                  | Unknown              |
+-----------------------+----------------------+
| Ethnic Group          | Unknown              |
+-----------------------+----------------------+
 
 
 Author
 
 
+--------------+--------------------------------------------+
| Author       | [facility]  |
|              | and [facility]                                |
+--------------+--------------------------------------------+
| Organization | [facility]  |
|              | and [facility]                                |
+--------------+--------------------------------------------+
| Address      | Unknown                                    |
+--------------+--------------------------------------------+
| Phone        | Unavailable                                |
+--------------+--------------------------------------------+
 
 
 
 Support
 
 
+----------------+--------------+---------------------+-----------------+
| Name           | Relationship | Address             | Phone           |
+----------------+--------------+---------------------+-----------------+
| Ada/Ed Radhames | ECON         | [place]              | [phone] |
|                |              | [place]  |                 |
|                |              |  [address]              |                 |
+----------------+--------------+---------------------+-----------------+
 
 
 
 
 Care Team Providers
 
 
+-----------------------+------+-------------+
| Care Team Member Name | Role | Phone       |
+-----------------------+------+-------------+
 PCP  | Unavailable |
+-----------------------+------+-------------+
 
 
 
 Reason for Visit
 
 
+-------------------+----------+
| Reason            | Comments |
+-------------------+----------+
| Medication Refill |          |
+-------------------+----------+
 
 
 
 Encounter Details
 
 
+--------+--------+---------------------+----------------------+-------------------+
| Date   | Type   | Department          | Care Team            | Description       |
+--------+--------+---------------------+----------------------+-------------------+
| / | Refill |   [place]         |   Marzena Moser  | Medication Refill |
| 2017   |        | NEPHROLOGY  [address]   | M, DO  [address]      |                   |
|        |        | [address]   | [address]      |                   |
|        |        | [place], [place]     | [place], [place]      |                   |
|        |        | [address]          | [address]  [phone]  |                   |
|        |        | [phone]        |  [phone] (Fax)  |                   |
+--------+--------+---------------------+----------------------+-------------------+
 
 
 
 Social History
 
 
+--------------+-------+-----------+--------+------+
| Tobacco Use  | Types | Packs/Day | Years  | Date |
|              |       |           | Used   |      |
+--------------+-------+-----------+--------+------+
| Never Smoker |       |           |        |      |
+--------------+-------+-----------+--------+------+
 
 
 
+---------------------+---+---+---+
| Smokeless Tobacco:  |   |   |   |
| Never Used          |   |   |   |
+---------------------+---+---+---+
 
 
 
+---------------------------------------------------------------+
| Comments: some second hand smoke exposure, but fairly minimal |
 
+---------------------------------------------------------------+
 
 
 
+-------------+-------------+---------+----------+
| Alcohol Use | Drinks/Week | oz/Week | Comments |
+-------------+-------------+---------+----------+
| No          |             |         |          |
+-------------+-------------+---------+----------+
 
 
 
+------------------+---------------+
| Sex Assigned at  | Date Recorded |
| Birth            |               |
+------------------+---------------+
| Not on file      |               |
+------------------+---------------+
 
 
 
+----------------+-------------+-------------+
| Job Start Date | Occupation  | Industry    |
+----------------+-------------+-------------+
| Not on file    | Not on file | Not on file |
+----------------+-------------+-------------+
 
 
 
+----------------+--------------+------------+
| Travel History | Travel Start | Travel End |
+----------------+--------------+------------+
 
 
 
+-------------------------------------+
| No recent travel history available. |
+-------------------------------------+
 documented as of this encounter
 
 Plan of Treatment
 
 
+--------+-----------+------------+----------------------+-------------------+
| Date   | Type      | Specialty  | Care Team            | Description       |
+--------+-----------+------------+----------------------+-------------------+
| / | Office    | Cardiology |   HellalfredoTonia,    |                   |
|    | Visit     |            | ARNP  [address]   |                   |
|        |           |            | St  [place], [place]  |                   |
|        |           |            | [address]  [phone]  |                   |
|        |           |            |  [phone] (Fax)  |                   |
+--------+-----------+------------+----------------------+-------------------+
| / | Hospital  | Radiology  |   Popeye Townsend, |                   |
|    | Encounter |            |  MD  [address] |                   |
|        |           |            |  St.  [place],   |                   |
|        |           |            | [place] [address]             |                   |
|        |           |            | [phone]         |                   |
|        |           |            | [phone] (Fax)   |                   |
+--------+-----------+------------+----------------------+-------------------+
| / | Surgery   | Radiology  |   Popeye Townsend, | CV EP PPM SYSTEM  |
 
|    |           |            |  MD  [address] | IMPLANT           |
|        |           |            |  St.  [place],   |                   |
|        |           |            | [place] [address]             |                   |
|        |           |            | [phone]         |                   |
|        |           |            | [phone] (Fax)   |                   |
+--------+-----------+------------+----------------------+-------------------+
| 02/10/ | Clinical  | Cardiology |                      |                   |
|    | Support   |            |                      |                   |
+--------+-----------+------------+----------------------+-------------------+
| / | Office    | Cardiology |   Tonia Wilson,    |                   |
|    | Visit     |            | ARNP  [address]   |                   |
|        |           |            | [place]  |                   |
|        |           |            | [address]  [phone]  |                   |
|        |           |            |  [phone] (Fax)  |                   |
+--------+-----------+------------+----------------------+-------------------+
| / | Off-Site  | Nephrology |   Marzena Moser  |                   |
|    | Visit     |            | DO ETIENNE  [address] [place] [address]      |                   |
|        |           |            | [address]      |                   |
|        |           |            | [place]      |                   |
|        |           |            | [address]  [phone]  |                   |
|        |           |            |  [phone] (Fax)  |                   |
+--------+-----------+------------+----------------------+-------------------+
 documented as of this encounter
 
 Visit Diagnoses
 Not on filedocumented in this encounter

## 2020-01-08 NOTE — XMS
Encounter Summary
  Created on: 2020
 
 Viviana Ricci
 External Reference #: [id number]
 : 47
 Sex: Female
 
 Demographics
 
 
+-----------------------+----------------------+
| Address               | [address]      |
|                       | [place]  [address] |
+-----------------------+----------------------+
| Home Phone            | [phone]      |
+-----------------------+----------------------+
| Preferred Language    | Unknown              |
+-----------------------+----------------------+
| Marital Status        | Single               |
+-----------------------+----------------------+
| Christianity Affiliation | [id number]                 |
+-----------------------+----------------------+
| Race                  | Unknown              |
+-----------------------+----------------------+
| Ethnic Group          | Unknown              |
+-----------------------+----------------------+
 
 
 Author
 
 
+--------------+--------------------------------------------+
| Author       | [facility]  |
|              | and [place]                                |
+--------------+--------------------------------------------+
| Organization | [facility]  |
|              | and [place]                                |
+--------------+--------------------------------------------+
| Address      | Unknown                                    |
+--------------+--------------------------------------------+
| Phone        | Unavailable                                |
+--------------+--------------------------------------------+
 
 
 
 Support
 
 
+----------------+--------------+---------------------+-----------------+
| Name           | Relationship | Address             | Phone           |
+----------------+--------------+---------------------+-----------------+
| Ada/Ed Radhames | ECON         | [place]              | [phone] |
|                |              | [place]  |                 |
|                |              |  [address]              |                 |
+----------------+--------------+---------------------+-----------------+
 
 
 
 
 Care Team Providers
 
 
+-----------------------+------+-------------+
| Care Team Member Name | Role | Phone       |
+-----------------------+------+-------------+
 PCP  | Unavailable |
+-----------------------+------+-------------+
 
 
 
 Encounter Details
 
 
+--------+----------+---------------------+----------------------+-------------+
| Date   | Type     | Department          | Care Team            | Description |
+--------+----------+---------------------+----------------------+-------------+
| 07/10/ | Abstract |   [facility] [place]         |   Marzena Moser  |             |
|    |          | NEPHROLOGY  [address]   | M, DO  [address]      |             |
|        |          | [address]   | [address]      |             |
|        |          | [place], [place]     | [place]      |             |
|        |          | [address]          | [address]  [phone]  |             |
|        |          | [phone]        |  [phone] (Fax)  |             |
+--------+----------+---------------------+----------------------+-------------+
 
 
 
 Social History
 
 
+--------------+-------+-----------+--------+------+
| Tobacco Use  | Types | Packs/Day | Years  | Date |
|              |       |           | Used   |      |
+--------------+-------+-----------+--------+------+
| Never Smoker |       |           |        |      |
+--------------+-------+-----------+--------+------+
 
 
 
+---------------------+---+---+---+
| Smokeless Tobacco:  |   |   |   |
| Never Used          |   |   |   |
+---------------------+---+---+---+
 
 
 
+---------------------------------------------------------------+
| Comments: some second hand smoke exposure, but fairly minimal |
+---------------------------------------------------------------+
 
 
 
+-------------+-------------+---------+----------+
| Alcohol Use | Drinks/Week | oz/Week | Comments |
+-------------+-------------+---------+----------+
| No          |             |         |          |
+-------------+-------------+---------+----------+
 
 
 
 
+------------------+---------------+
| Sex Assigned at  | Date Recorded |
| Birth            |               |
+------------------+---------------+
| Not on file      |               |
+------------------+---------------+
 
 
 
+----------------+-------------+-------------+
| Job Start Date | Occupation  | Industry    |
+----------------+-------------+-------------+
| Not on file    | Not on file | Not on file |
+----------------+-------------+-------------+
 
 
 
+----------------+--------------+------------+
| Travel History | Travel Start | Travel End |
+----------------+--------------+------------+
 
 
 
+-------------------------------------+
| No recent travel history available. |
+-------------------------------------+
 documented as of this encounter
 
 Plan of Treatment
 
 
+--------+-----------+------------+----------------------+-------------------+
| Date   | Type      | Specialty  | Care Team            | Description       |
+--------+-----------+------------+----------------------+-------------------+
| / | Office    | Cardiology |   Tonia Wilson,    |                   |
|    | Visit     |            | ARNJESSICA  [address]   |                   |
|        |           |            | [place]  |                   |
|        |           |            | [address]  [phone]  |                   |
|        |           |            |  [phone] (Fax)  |                   |
+--------+-----------+------------+----------------------+-------------------+
| / | Hospital  | Radiology  |   Popeye Townsend, |                   |
|    | Encounter |            |  MD  [address] |                   |
|        |           |            |  St.  [place],   |                   |
|        |           |            | [place] [address]             |                   |
|        |           |            | [phone]         |                   |
|        |           |            | [phone] (Fax)   |                   |
+--------+-----------+------------+----------------------+-------------------+
| / | Surgery   | Radiology  |   Popeye Townsend, | CV EP PPM SYSTEM  |
|    |           |            |  MD  [address] | IMPLANT           |
|        |           |            |  St.  [place],   |                   |
|        |           |            | [place] [address]             |                   |
|        |           |            | [phone]         |                   |
|        |           |            | [phone] (Fax)   |                   |
+--------+-----------+------------+----------------------+-------------------+
| 02/10/ | Clinical  | Cardiology |                      |                   |
|    | Support   |            |                      |                   |
+--------+-----------+------------+----------------------+-------------------+
| / | Office    | Cardiology |   Tonia Wilson,    |                   |
|    | Visit     |            | ARNP  [address]   |                   |
 
|        |           |            | St  [place], [place]  |                   |
|        |           |            | [address] [phone]  |                   |
|        |           |            |  [phone] (Fax)  |                   |
+--------+-----------+------------+----------------------+-------------------+
| / | Off-Site  | Nephrology |   Marzena Moser  |                   |
| 2020   | Visit     |            | DO ETIENNE  [address] [place] [address]      |                   |
|        |           |            | [address]      |                   |
|        |           |            | [place]      |                   |
|        |           |            | [id number] [phone]  |                   |
|        |           |            |  [phone] (Fax)  |                   |
+--------+-----------+------------+----------------------+-------------------+
 documented as of this encounter
 
 Visit Diagnoses
 Not on filedocumented in this encounter

## 2020-01-08 NOTE — XMS
Encounter Summary
  Created on: 2020
 
 Viviana Ricci
 External Reference #: [id number]
 : 47
 Sex: Female
 
 Demographics
 
 
+-----------------------+----------------------+
| Address               | [address]      |
|                       | [place]  [address] |
+-----------------------+----------------------+
| Home Phone            | [phone]      |
+-----------------------+----------------------+
| Preferred Language    | Unknown              |
+-----------------------+----------------------+
| Marital Status        | Single               |
+-----------------------+----------------------+
| Mandaeism Affiliation | [id number]                 |
+-----------------------+----------------------+
| Race                  | Unknown              |
+-----------------------+----------------------+
| Ethnic Group          | Unknown              |
+-----------------------+----------------------+
 
 
 Author
 
 
+--------------+--------------------------------------------+
| Author       | [facility]  |
|              | and [facility]                                |
+--------------+--------------------------------------------+
| Organization | [facility]  |
|              | and [facility]                                |
+--------------+--------------------------------------------+
| Address      | Unknown                                    |
+--------------+--------------------------------------------+
| Phone        | Unavailable                                |
+--------------+--------------------------------------------+
 
 
 
 Support
 
 
+----------------+--------------+---------------------+-----------------+
| Name           | Relationship | Address             | Phone           |
+----------------+--------------+---------------------+-----------------+
| Ada/Ed Radhames | ECON         | [address]              | [phone] |
|                |              | [place]  |                 |
|                |              |  [address]              |                 |
+----------------+--------------+---------------------+-----------------+
 
 
 
 
 Care Team Providers
 
 
+-----------------------+------+-------------+
| Care Team Member Name | Role | Phone       |
+-----------------------+------+-------------+
 PCP  | Unavailable |
+-----------------------+------+-------------+
 
 
 
 Encounter Details
 
 
+--------+-----------+----------------------+-----------+-------------+
| Date   | Type      | Department           | Care Team | Description |
+--------+-----------+----------------------+-----------+-------------+
| / | Hospital  |   [facility] |           |             |
|  - | Encounter |  [facility]     |           |             |
|        |           | [address]  |           |             |
| / |           | [place] [id number] |           |             |
|    |           |   [phone]       |           |             |
+--------+-----------+----------------------+-----------+-------------+
 
 
 
 Social History
 
 
+----------------+-------+-----------+--------+------+
| Tobacco Use    | Types | Packs/Day | Years  | Date |
|                |       |           | Used   |      |
+----------------+-------+-----------+--------+------+
| Never Assessed |       |           |        |      |
+----------------+-------+-----------+--------+------+
 
 
 
+------------------+---------------+
| Sex Assigned at  | Date Recorded |
| Birth            |               |
+------------------+---------------+
| Not on file      |               |
+------------------+---------------+
 
 
 
+----------------+-------------+-------------+
| Job Start Date | Occupation  | Industry    |
+----------------+-------------+-------------+
| Not on file    | Not on file | Not on file |
+----------------+-------------+-------------+
 
 
 
+----------------+--------------+------------+
| Travel History | Travel Start | Travel End |
+----------------+--------------+------------+
 
 
 
 
+-------------------------------------+
| No recent travel history available. |
+-------------------------------------+
 documented as of this encounter
 
 Plan of Treatment
 
 
+--------+-----------+------------+----------------------+-------------------+
| Date   | Type      | Specialty  | Care Team            | Description       |
+--------+-----------+------------+----------------------+-------------------+
| / | Office    | Cardiology |   Tonia Wilson,    |                   |
|    | Visit     |            | ARNP  [address]   |                   |
|        |           |            | St  [place] [facility], [place]  |                   |
|        |           |            | [address]  [phone]  |                   |
|        |           |            |  [phone] (Fax)  |                   |
+--------+-----------+------------+----------------------+-------------------+
| / | Hospital  | Radiology  |   Popeye Townsend, |                   |
|    | Encounter |            |  MD  [address] |                   |
|        |           |            |  St.  [place],   |                   |
|        |           |            | [place] [address]             |                   |
|        |           |            | [phone]         |                   |
|        |           |            | [phone] (Fax)   |                   |
+--------+-----------+------------+----------------------+-------------------+
| / | Surgery   | Radiology  |   Popeye Townsend, | CV EP PPM SYSTEM  |
|    |           |            |  MD  [address] | IMPLANT           |
|        |           |            |  St.  [place],   |                   |
|        |           |            | [place] [address]             |                   |
|        |           |            | [phone]         |                   |
|        |           |            | [phone] (Fax)   |                   |
+--------+-----------+------------+----------------------+-------------------+
| 02/10/ | Clinical  | Cardiology |                      |                   |
|    | Support   |            |                      |                   |
+--------+-----------+------------+----------------------+-------------------+
| / | Office    | Cardiology |   Hellberg, Tonia,    |                   |
|    | Visit     |            | [facility]  [address]   |                   |
|        |           |            | [place]  |                   |
|        |           |            | [address]  [phone]  |                   |
|        |           |            |  [phone] (Fax)  |                   |
+--------+-----------+------------+----------------------+-------------------+
| / | Off-Site  | Nephrology |   Marzena Moser  |                   |
|    | Visit     |            | DO ETIENNE  [address] [place] [address]      |                   |
|        |           |            | [address]      |                   |
|        |           |            | [place]      |                   |
|        |           |            | [address] [phone]  |                   |
|        |           |            |  [phone] (Fax)  |                   |
+--------+-----------+------------+----------------------+-------------------+
 documented as of this encounter
 
 Visit Diagnoses
 Not on filedocumented in this encounter

## 2020-01-08 NOTE — XMS
Encounter Summary
  Created on: 2020
 
 Viviana Ricci
 External Reference #: [id number]
 : 47
 Sex: Female
 
 Demographics
 
 
+-----------------------+----------------------+
| Address               | [address]      |
|                       | [place]  [address] |
+-----------------------+----------------------+
| Home Phone            | [phone]      |
+-----------------------+----------------------+
| Preferred Language    | Unknown              |
+-----------------------+----------------------+
| Marital Status        | Single               |
+-----------------------+----------------------+
| [employer] Affiliation | [id number]                 |
+-----------------------+----------------------+
| Race                  | Unknown              |
+-----------------------+----------------------+
| Ethnic Group          | Unknown              |
+-----------------------+----------------------+
 
 
 Author
 
 
+--------------+--------------------------------------------+
| Author       | [facility]  |
|              | [facility]                                |
+--------------+--------------------------------------------+
| Organization | [facility]  |
|              | and [facility]                                |
+--------------+--------------------------------------------+
| Address      | Unknown                                    |
+--------------+--------------------------------------------+
| Phone        | Unavailable                                |
+--------------+--------------------------------------------+
 
 
 
 Support
 
 
+----------------+--------------+---------------------+-----------------+
| Name           | Relationship | Address             | Phone           |
+----------------+--------------+---------------------+-----------------+
| Ada/Ed Radhames | ECON         | [address]              | [phone] |
|                |              | [place]  |                 |
|                |              |  [address]              |                 |
+----------------+--------------+---------------------+-----------------+
 
 
 
 
 Care Team Providers
 
 
+-----------------------+------+-------------+
| Care Team Member Name | Role | Phone       |
+-----------------------+------+-------------+
 PCP  | Unavailable |
+-----------------------+------+-------------+
 
 
 
 Encounter Details
 
 
+--------+-----------+---------------------+----------------------+----------------------+
| Date   | Type      | Department          | Care Team            | Description          |
+--------+-----------+---------------------+----------------------+----------------------+
| / | Off-Site  |   [facility]         |   Marzena Moser  | Complications of     |
|    | Visit     | NEPHROLOGY  [address]   | M, DO  [address]      | transplanted kidney  |
|        |           | [address]   | [place]      | (Primary Dx); Asthma |
|        |           | [place], [place]     | [place], [place]      |  attack; Unspecified |
|        |           | [address]          | [address]  [phone]  |  hypertensive kidney |
|        |           | [phone]        |  [phone] (Fax)  |  disease with        |
|        |           |                     |                      | chronic kidney       |
|        |           |                     |                      | disease stage I      |
|        |           |                     |                      | through stage IV, or |
|        |           |                     |                      |  unspecified;        |
|        |           |                     |                      | DIABETES MELLITUS,   |
|        |           |                     |                      | TYPE II,             |
|        |           |                     |                      | UNCONTROLLED         |
+--------+-----------+---------------------+----------------------+----------------------+
 
 
 
 Social History
 
 
+--------------+-------+-----------+--------+------+
| Tobacco Use  | Types | Packs/Day | Years  | Date |
|              |       |           | Used   |      |
+--------------+-------+-----------+--------+------+
| Never Smoker |       |           |        |      |
+--------------+-------+-----------+--------+------+
 
 
 
+---------------------+---+---+---+
| Smokeless Tobacco:  |   |   |   |
| Never Used          |   |   |   |
+---------------------+---+---+---+
 
 
 
+---------------------------------------------------------------+
| Comments: some second hand smoke exposure, but fairly minimal |
+---------------------------------------------------------------+
 
 
 
 
+-------------+-------------+---------+----------+
| Alcohol Use | Drinks/Week | oz/Week | Comments |
+-------------+-------------+---------+----------+
| No          |             |         |          |
+-------------+-------------+---------+----------+
 
 
 
+------------------+---------------+
| Sex Assigned at  | Date Recorded |
| Birth            |               |
+------------------+---------------+
| Not on file      |               |
+------------------+---------------+
 
 
 
+----------------+-------------+-------------+
| Job Start Date | Occupation  | Industry    |
+----------------+-------------+-------------+
| Not on file    | Not on file | Not on file |
+----------------+-------------+-------------+
 
 
 
+----------------+--------------+------------+
| Travel History | Travel Start | Travel End |
+----------------+--------------+------------+
 
 
 
+-------------------------------------+
| No recent travel history available. |
+-------------------------------------+
 documented as of this encounter
 
 Last Filed Vital Signs
 
 
+-------------------+----------------------+----------------------+----------+
| Vital Sign        | Reading              | Time Taken           | Comments |
+-------------------+----------------------+----------------------+----------+
| Blood Pressure    | 124/68               | 2013  6:56 PM  |          |
|                   |                      | PDT                  |          |
+-------------------+----------------------+----------------------+----------+
| Pulse             | -                    | -                    |          |
+-------------------+----------------------+----------------------+----------+
| Temperature       | 35.8   C (96.5   F)  | 2013  6:56 PM  |          |
|                   |                      | PDT                  |          |
+-------------------+----------------------+----------------------+----------+
| Respiratory Rate  | -                    | -                    |          |
+-------------------+----------------------+----------------------+----------+
| Oxygen Saturation | -                    | -                    |          |
+-------------------+----------------------+----------------------+----------+
| Inhaled Oxygen    | -                    | -                    |          |
| Concentration     |                      |                      |          |
+-------------------+----------------------+----------------------+----------+
| Weight            | 134.8 kg (297 lb 2.9 | 2013  6:56 PM  |          |
|                   |  oz)                 | PDT                  |          |
+-------------------+----------------------+----------------------+----------+
 
| Height            | -                    | -                    |          |
+-------------------+----------------------+----------------------+----------+
| Body Mass Index   | 49.45                | 2013  2:12 PM  |          |
|                   |                      | PDT                  |          |
+-------------------+----------------------+----------------------+----------+
 documented in this encounter
 
 Progress Notes
 Marzena Moser DO - 2013  6:58 PM PDTFormatting of this note might be different
 from the original.
 Subjective:  NEPHROLOGY 
  
 Patient ID: Viviana Ricci is a 67 y.o. female.
 
 HPI Comments: 
 Followup for this 67 year old white female s/p renal allograft, 05, with previous allo
graft dysfunction secondary to FSGS, also with Type II DM, hypertension, hypothyroidism, hyp
erlipidemia, 
 SHPTH,  previous low back pain, obesity/JACK, chronic pulmonary  hypertension, possibly rela
jeanie to obesity?, and  CAD, s/p CABG x3 vessels,.   She states that she has been troubled
 by dyspnea and cough x 2 weeks.  At times her cough is productive of "white" sputum.  She h
as not had fever, headache, with this.  She has taken a course of an unknown PO antibiotic w
angelesout improvement.  She states that she is working closely with Dr. Nena Boykin for 
this.   She is due to see her tomorrow for possibly a methylcholine challenge test.
 
 On further discussion, she inquires whether she would be candidate for bariatric surgery, a
s apparently she is very discourage with her large BMI for several years.  
 
 MEDS: 
 
 Prograf 1.5 mg, BID
 Mycophenolate 250 mg, TID.
 Prednisone 5 mg, daily.
 metoprolol tartrate (LOPRESSOR) 25 mg tablet, Take 1 tablet by mouth 2 times daily., Disp: 
60 tablet, Rfl: 11
 Prenatal Multivit-Min-Fe-FA (PRENATAL VITAMINS) 0.8 MG TABS, Take 0.8 mg by mouth Daily., D
isp: 30 each, Rfl: 11
 valsartan (DIOVAN) 160 mg tablet, Take 1 tablet by mouth Daily., Disp: 30 tablet, Rfl: 11
 albuterol (PROAIR HFA) 90 mcg/puff inhaler, Inhale 2 puffs into the lungs every 6 hours as 
needed for Wheezing., Disp: 1 Inhaler, Rfl: 2
 lisinopril (PRINIVIL,ZESTRIL) 30 MG tablet, Take 1 tablet by mouth Daily., Disp: 90 tablet,
 Rfl: 3 glucose blood VI test strips (ONE TOUCH ULTRA TEST) strip, Check blood sugar before 
each meal and as directed, Disp: 100 each, Rfl: 12
 furosemide (LASIX) 40 mg tablet, Take two tablets by mouth daily., Disp: 60 tablet, Rfl: 5
 rosuvastatin (CRESTOR) 40 MG tablet, Take 40 mg by mouth nightly.  , Disp: , Rfl:  
 insulin glargine (LANTUS) 100 units/mL injection, Inject 20 Units under the skin every morn
ing.  , Disp: , Rfl: 
 cinacalcet (SENSIPAR) 30 mg tablet, Take 1 tablet by mouth Daily., Disp: 30 tablet, Rfl: 12
 fludrocortisone (FLORINEF) 0.1 mg tablet, Take 1 tablet by mouth Every other day., Disp: 30
 tablet, Rfl: 11
 levothyroxine (LEVOTHROID) 50 mcg tablet, Take 1 tablet by mouth Daily., Disp: 30 tablet, R
fl: 11
 omeprazole (PRILOSEC) 20 mg capsule, Take one capsule by mouth once daily on an empty stoma
ch, Disp: , Rfl: 
 allopurinol (ZYLOPRIM) 100 mg tablet, Take 100 mg by mouth Daily., Disp: , Rfl: 
 aspirin 81 MG EC tablet, Take 81 mg by mouth Daily., Disp: , Rfl: 
 loperamide (ANTI-DIARRHEAL) 2 mg capsule, Take 2 mg by mouth Daily as needed.,  
 insulin lispro (HUMALOG) 100 units/mL injection, Inject subcutaneously before meals accordi
ng to sliding scale, Disp: , Rfl: 
 Cholecalciferol (VITAMIN D3) 5000 UNITS CAPS, Take 5,000 Units by mouth Once a week., Disp:
 
 , Rfl: 
 magnesium oxide (MAG-OX) 400 mg tablet, Take 400 mg by mouth 2 times daily., Disp: , Rfl: 
 
    
  Review of Systems
 
 No Known Allergies
 
 Objective:   Blood pressure 124/68, temperature 35.8 C (96.5 F), weight 134.8 kg (297 l
b 2.9 oz).
 
  
 Physical Exam
 
 HEENT: No thrush.
 Heart:  Regular rate and rhythm with no S3, S4, murmur or rub.
 Lungs:   (+) bilateral expiratory wheezes at both bases.  No rales.
 Abdomen:  Soft, flat,  Renal allograft in RLQ is nontender, normoactive bowel sounds.
 Extremities: No clubbing, edema, or foot ulcers.
 
 LAB:   BUN 69, Cr 1.85, K+ 5.0, HCO3 17, Glu 137, Mg++ 2.0, Hbaic = 8.2%,  , HDL 45,
  LDL 72, ,  WBC 4.2, H/H= 12.5/ 37.0, ,000, Tacrolimus = 5.7 ng/ml.
 
 Assessment: 
 
 1.  Renal Allograft--Scr is above previous baseline.  Her BUN:Cr ration appears consistent 
with some ECF volume contraction?
 2.  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
 3.  Type 2 DM--suboptimal control.
 4.  Hyperlipidemia--fairly good control.
 5.  Hypertension--stable.
 6.  SHPTH--about same.
 7.  Obesity/JACK/Pulmonary hypertension--unfortunately, she is having little success with we
ight loss?
 8.  CAD, s/p CABG, --stable on ASA, metoprolol, atorvastatin.
 9.  Type IV RTA--stable.
 10. Chronic Low Back pain--in remission.
 11.  Possible acute exacerbation of asthma?
 
 Plan: 
 
 1.   I encouraged  Viviana that given her lab, she should try to orally salt-load herself for 
the next 72 hours.   She should then recheck her standing order after that, and see if thing
s are improved.
 Her Prograf level appears reasonable.
 2.  In regards to her cough with wheezing--CXR not classic  for acute CHF.  Given her physi
claudia findings, would suspect asthma is primary culprit at this point?  Will defer any prednis
one Rx until she has had her appointment with Dr. Nena Boykin , tomorrow.
 3.  Will review her lab next week when available.
 4.   Will continue the current immunosuppression for now.
 5.   She will have a repeat appt. In 6 weeks.   At the end of the exam, she earnestly inqui
res about whether she would be a candidate for bariatric surgery.  Given her Type II DM, and
 difficulty with control, with current BMI >40, I think that there is a reasonably good argu
ment for it?
 
 CC:  Nena Dalal M.D., [facility], [facility] CenterElectronically signed by Marzena mak DO at 2013  3:49 PM PDTdocumented in this encounter
 
 Plan of Treatment
 
 
 
+--------+-----------+------------+----------------------+-------------------+
| Date   | Type      | Specialty  | Care Team            | Description       |
+--------+-----------+------------+----------------------+-------------------+
| / | Office    | Cardiology |   Tonia Wilson,    |                   |
|    | Visit     |            | ARNJESSICA  [address]   |                   |
|        |           |            | St  [place] [facility], [place]  |                   |
|        |           |            | [address]  [phone]  |                   |
|        |           |            |  [phone] (Fax)  |                   |
+--------+-----------+------------+----------------------+-------------------+
| / | Hospital  | Radiology  |   Popeye Townsend, |                   |
|    | Encounter |            |  MD  [address] |                   |
|        |           |            |  [place],   |                   |
|        |           |            | [place] [address]             |                   |
|        |           |            | [phone]         |                   |
|        |           |            | [phone] (Fax)   |                   |
+--------+-----------+------------+----------------------+-------------------+
| / | Surgery   | Radiology  |   Popeye Townsend, | CV EP PPM SYSTEM  |
|    |           |            |  MD  [address] | IMPLANT           |
|        |           |            |  [place],   |                   |
|        |           |            | [place] [address]             |                   |
|        |           |            | [phone]         |                   |
|        |           |            | [phone] (Fax)   |                   |
+--------+-----------+------------+----------------------+-------------------+
| 02/10/ | Clinical  | Cardiology |                      |                   |
|    | Support   |            |                      |                   |
+--------+-----------+------------+----------------------+-------------------+
| / | Office    | Cardiology |   Tonia Wilson,    |                   |
|    | Visit     |            | [facility]  [address]   |                   |
|        |           |            | [place]  |                   |
|        |           |            | [address]  [phone]  |                   |
|        |           |            |  [phone] (Fax)  |                   |
+--------+-----------+------------+----------------------+-------------------+
| / | Off-Site  | Nephrology |   Marzena Moser  |                   |
|    | Visit     |            | DO ETIENNE  [address] [place] [address]      |                   |
|        |           |            | [address]      |                   |
|        |           |            | [place]      |                   |
|        |           |            | [id number] [phone]  |                   |
|        |           |            |  [phone] (Fax)  |                   |
+--------+-----------+------------+----------------------+-------------------+
 documented as of this encounter
 
 Visit Diagnoses
 
 
+-----------------------------------------------------------------------------------------+
| Diagnosis                                                                               |
+-----------------------------------------------------------------------------------------+
|   Complications of transplanted kidney - Primary                                        |
+-----------------------------------------------------------------------------------------+
|   Asthma attack  Unspecified asthma                                                     |
+-----------------------------------------------------------------------------------------+
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
| stage IV, or unspecified(403.90)  Unspecified hypertensive kidney disease with chronic  |
| kidney disease stage I through stage IV, or unspecified                                 |
+-----------------------------------------------------------------------------------------+
|   DIABETES MELLITUS, TYPE II, UNCONTROLLED  Type II or unspecified type diabetes        |
| mellitus without mention of complication, uncontrolled                                  |
+-----------------------------------------------------------------------------------------+
 
 documented in this encounter

## 2020-01-08 NOTE — XMS
Encounter Summary
  Created on: 2020
 
 Viviana Ricci
 External Reference #: [id number]
 : 47
 Sex: Female
 
 Demographics
 
 
+-----------------------+----------------------+
| Address               | [address]      |
|                       | [place]  [address] |
+-----------------------+----------------------+
| Home Phone            | [phone]      |
+-----------------------+----------------------+
| Preferred Language    | Unknown              |
+-----------------------+----------------------+
| Marital Status        | Single               |
+-----------------------+----------------------+
| Sikh Affiliation | [id number]                 |
+-----------------------+----------------------+
| Race                  | Unknown              |
+-----------------------+----------------------+
| Ethnic Group          | Unknown              |
+-----------------------+----------------------+
 
 
 Author
 
 
+--------------+--------------------------------------------+
| Author       | [facility]  |
|              | and [facility]                                |
+--------------+--------------------------------------------+
| Organization | [facility]  |
|              | and [facility]                                |
+--------------+--------------------------------------------+
| Address      | Unknown                                    |
+--------------+--------------------------------------------+
| Phone        | Unavailable                                |
+--------------+--------------------------------------------+
 
 
 
 Support
 
 
+----------------+--------------+---------------------+-----------------+
| Name           | Relationship | Address             | Phone           |
+----------------+--------------+---------------------+-----------------+
| Ada/Ed Radhames | ECON         | [address]              | [phone] |
|                |              | [place]  |                 |
|                |              |  [id number]              |                 |
+----------------+--------------+---------------------+-----------------+
 
 
 
 
 Care Team Providers
 
 
+-----------------------+------+-------------+
| Care Team Member Name | Role | Phone       |
+-----------------------+------+-------------+
 PCP  | Unavailable |
+-----------------------+------+-------------+
 
 
 
 Encounter Details
 
 
+--------+----------+---------------------+----------------------+-------------+
| Date   | Type     | Department          | Care Team            | Description |
+--------+----------+---------------------+----------------------+-------------+
| / | Abstract |   [facility] [place]         |   Marzena Moser  |             |
|    |          | NEPHROLOGY  [address]   | M, DO  [address]      |             |
|        |          | [address]   | [address]      |             |
|        |          | [place], [place]     | [place]      |             |
|        |          | [address]          | [address]  [phone]  |             |
|        |          | [phone]        |  [phone] (Fax)  |             |
+--------+----------+---------------------+----------------------+-------------+
 
 
 
 Social History
 
 
+--------------+-------+-----------+--------+------+
| Tobacco Use  | Types | Packs/Day | Years  | Date |
|              |       |           | Used   |      |
+--------------+-------+-----------+--------+------+
| Never Smoker |       |           |        |      |
+--------------+-------+-----------+--------+------+
 
 
 
+---------------------+---+---+---+
| Smokeless Tobacco:  |   |   |   |
| Never Used          |   |   |   |
+---------------------+---+---+---+
 
 
 
+---------------------------------------------------------------+
| Comments: some second hand smoke exposure, but fairly minimal |
+---------------------------------------------------------------+
 
 
 
+-------------+-------------+---------+----------+
| Alcohol Use | Drinks/Week | oz/Week | Comments |
+-------------+-------------+---------+----------+
| No          |             |         |          |
+-------------+-------------+---------+----------+
 
 
 
 
+------------------+---------------+
| Sex Assigned at  | Date Recorded |
| Birth            |               |
+------------------+---------------+
| Not on file      |               |
+------------------+---------------+
 
 
 
+----------------+-------------+-------------+
| Job Start Date | Occupation  | Industry    |
+----------------+-------------+-------------+
| Not on file    | Not on file | Not on file |
+----------------+-------------+-------------+
 
 
 
+----------------+--------------+------------+
| Travel History | Travel Start | Travel End |
+----------------+--------------+------------+
 
 
 
+-------------------------------------+
| No recent travel history available. |
+-------------------------------------+
 documented as of this encounter
 
 Plan of Treatment
 
 
+--------+-----------+------------+----------------------+-------------------+
| Date   | Type      | Specialty  | Care Team            | Description       |
+--------+-----------+------------+----------------------+-------------------+
| / | Office    | Cardiology |   Tonia Wilson,    |                   |
|    | Visit     |            | ARNJESSICA  [address]   |                   |
|        |           |            | [place]  |                   |
|        |           |            | [address]  [phone]  |                   |
|        |           |            |  [phone] (Fax)  |                   |
+--------+-----------+------------+----------------------+-------------------+
| / | Hospital  | Radiology  |   Popeye Townsend, |                   |
|    | Encounter |            |  MD  [address] |                   |
|        |           |            |  St.  [place],   |                   |
|        |           |            | [place] [address]             |                   |
|        |           |            | [phone]         |                   |
|        |           |            | [phone] (Fax)   |                   |
+--------+-----------+------------+----------------------+-------------------+
| / | Surgery   | Radiology  |   Popeye Townsend, | CV EP PPM SYSTEM  |
|    |           |            |  MD  [address] | IMPLANT           |
|        |           |            |  St.  [place],   |                   |
|        |           |            | [place] [address]             |                   |
|        |           |            | [phone]         |                   |
|        |           |            | [phone] (Fax)   |                   |
+--------+-----------+------------+----------------------+-------------------+
| 02/10/ | Clinical  | Cardiology |                      |                   |
|    | Support   |            |                      |                   |
+--------+-----------+------------+----------------------+-------------------+
| / | Office    | Cardiology |   Tonia Wilson,    |                   |
|    | Visit     |            | ARNP  [address]   |                   |
 
|        |           |            | St  [place], [place]  |                   |
|        |           |            | [address]  [phone]  |                   |
|        |           |            |  [phone] (Fax)  |                   |
+--------+-----------+------------+----------------------+-------------------+
| / | Off-Site  | Nephrology |   Marzena Moser  |                   |
|    | Visit     |            | DO ETIENNE  [address] [place] [address]      |                   |
|        |           |            | [address]      |                   |
|        |           |            | [place]      |                   |
|        |           |            | [address]  [phone]  |                   |
|        |           |            |  [phone] (Fax)  |                   |
+--------+-----------+------------+----------------------+-------------------+
 documented as of this encounter
 
 Procedures
 
 
+------------------+--------+------------+----------------------+----------------------+
| Procedure Name   | Priori | Date/Time  | Associated Diagnosis | Comments             |
|                  | ty     |            |                      |                      |
+------------------+--------+------------+----------------------+----------------------+
| EXTERNAL LAB:    | Routin | 2018 |                      |   Results for this   |
| URINALYSIS       | e      |            |                      | procedure are in the |
|                  |        |            |                      |  results section.    |
+------------------+--------+------------+----------------------+----------------------+
| URINALYSIS WITH  | Routin | 2018 |                      |   Results for this   |
| MICROSCOPIC      | e      |            |                      | procedure are in the |
|                  |        |            |                      |  results section.    |
+------------------+--------+------------+----------------------+----------------------+
 documented in this encounter
 
 Results
 External Lab: Urinalysis (2018)
 
+-------------+----------+-----------+------------+--------------+
| Component   | Value    | Ref Range | Performed  | Pathologist  |
|             |          |           | At         | Signature    |
+-------------+----------+-----------+------------+--------------+
| UA Blood,   | negative |           |            |              |
| External    |          |           |            |              |
+-------------+----------+-----------+------------+--------------+
| UA Glucose, | normal   |           |            |              |
|  External   |          |           |            |              |
+-------------+----------+-----------+------------+--------------+
| UA Ketones, | negative |           |            |              |
|  External   |          |           |            |              |
+-------------+----------+-----------+------------+--------------+
| UA Ph,      | 5        |           |            |              |
| External    |          |           |            |              |
+-------------+----------+-----------+------------+--------------+
| UA          | 30       |           |            |              |
| Proteins,   |          |           |            |              |
| External    |          |           |            |              |
+-------------+----------+-----------+------------+--------------+
| UA RBC,     | 2        |           |            |              |
| External    |          |           |            |              |
+-------------+----------+-----------+------------+--------------+
| UA Specific | 1.013    |           |            |              |
|  Gravity,   |          |           |            |              |
| External    |          |           |            |              |
+-------------+----------+-----------+------------+--------------+
 
| UA          | large    |           |            |              |
| Leukocyte   |          |           |            |              |
| Esterase,   |          |           |            |              |
| External    |          |           |            |              |
+-------------+----------+-----------+------------+--------------+
 Urinalysis With Microscopic (2018)
 
+-------------+----------+----------------+------------+--------------+
| Component   | Value    | Ref Range      | Performed  | Pathologist  |
|             |          |                | At         | Signature    |
+-------------+----------+----------------+------------+--------------+
| WBC UA      | 50       | /HPF           |            |              |
+-------------+----------+----------------+------------+--------------+
| Clarity     | Cloudy   |                |            |              |
+-------------+----------+----------------+------------+--------------+
| Color,      | Yellow   | Light Yellow,  |            |              |
| Urine       |          | Yellow         |            |              |
+-------------+----------+----------------+------------+--------------+
| BACTERIA UA | 1+ (A)   | Negative /HPF  |            |              |
+-------------+----------+----------------+------------+--------------+
| SQUAMOUS    | 0-2      | 0 - 2 /LPF     |            |              |
| EPITHELIAL  |          |                |            |              |
| UA          |          |                |            |              |
+-------------+----------+----------------+------------+--------------+
| Nitrite,    | Negative | Negative       |            |              |
| Urine       |          |                |            |              |
+-------------+----------+----------------+------------+--------------+
 
 
 
+----------+
| Specimen |
+----------+
| Urine    |
+----------+
 documented in this encounter
 
 Visit Diagnoses
 Not on filedocumented in this encounter

## 2020-01-08 NOTE — XMS
Encounter Summary
  Created on: 2020
 
 Viviana Ricci
 External Reference #: [id number]
 : 47
 Sex: Female
 
 Demographics
 
 
+-----------------------+----------------------+
| Address               | [address]      |
|                       | [place]  [address] |
+-----------------------+----------------------+
| Home Phone            | [phone]      |
+-----------------------+----------------------+
| Preferred Language    | Unknown              |
+-----------------------+----------------------+
| Marital Status        | Single               |
+-----------------------+----------------------+
| [employer]ats[employer] Affiliation | [id number]                 |
+-----------------------+----------------------+
| Race                  | Unknown              |
+-----------------------+----------------------+
| Ethnic Group          | Unknown              |
+-----------------------+----------------------+
 
 
 Author
 
 
+--------------+--------------------------------------------+
| Author       | [facility]  |
|              | and [facility]                                |
+--------------+--------------------------------------------+
| Organization | [facility]  |
|              | and [facility]                                |
+--------------+--------------------------------------------+
| Address      | Unknown                                    |
+--------------+--------------------------------------------+
| Phone        | Unavailable                                |
+--------------+--------------------------------------------+
 
 
 
 Support
 
 
+----------------+--------------+---------------------+-----------------+
| Name           | Relationship | Address             | Phone           |
+----------------+--------------+---------------------+-----------------+
| Ada/Ed Radhames | ECON         | [address]              | [phone] |
|                |              | [place], [place]  |                 |
|                |              |  [id number]              |                 |
+----------------+--------------+---------------------+-----------------+
 
 
 
 
 Care Team Providers
 
 
+-----------------------+------+-------------+
| Care Team Member Name | Role | Phone       |
+-----------------------+------+-------------+
 PCP  | Unavailable |
+-----------------------+------+-------------+
 
 
 
 Reason for Referral
 Diagnostic/Screening (Routine)
 
+--------+--------+-----------+--------------+--------------+---------------+
| Status | Reason | Specialty | Diagnoses /  | Referred By  | Referred To   |
|        |        |           | Procedures   | Contact      | Contact       |
+--------+--------+-----------+--------------+--------------+---------------+
| Closed |        | Radiology |   Diagnoses  |              |   [facility]    |
|        |        |           |  Pulmonary   | [place],  | [address]  |
|        |        |           | hypertension | Nena GUSMAN,   |  [place], |
|        |        |           |  ([facility])       | MD  [address]    |  [place]           |
|        |        |           | Procedures   | [address]    | [address]    |
|        |        |           | ECHO         | [place], | Phone:        |
|        |        |           | Complete     |  [place] [address]    | [phone]  |
|        |        |           |              |              |  Fax:         |
|        |        |           |              |              | [phone]  |
+--------+--------+-----------+--------------+--------------+---------------+
 
 
 
 
 Reason for Visit
 Diagnostic/Screening (Routine)
 
+--------+--------+-----------+--------------+--------------+---------------+
| Status | Reason | Specialty | Diagnoses /  | Referred By  | Referred To   |
|        |        |           | Procedures   | Contact      | Contact       |
+--------+--------+-----------+--------------+--------------+---------------+
| Closed |        | Radiology |   Diagnoses  |              |   [facility]    |
|        |        |           |  Pulmonary   | [place],  | [address]  |
|        |        |           | hypertension | Nena B,   |  [place], |
|        |        |           |  ([facility])       | MD  [address]    |  [place]           |
|        |        |           | Procedures   | [address]    | [address]    |
|        |        |           | ECHO         | [place], | Phone:        |
|        |        |           | Complete     |  [place] [address]    | [phone]  |
|        |        |           |              |              |  Fax:         |
|        |        |           |              |              | [phone]  |
+--------+--------+-----------+--------------+--------------+---------------+
 
 
 
 
 Encounter Details
 
 
+--------+-----------+----------------------+----------------------+--------------------+
| Date   | Type      | Department           | Care Team            | Description        |
+--------+-----------+----------------------+----------------------+--------------------+
 
| 04/15/ | Hospital  |   [facility] |   [place],        | Pulmonary          |
|    | Encounter |  [facility]  [address] | Nena GUSMAN MD        | hypertension (HCC) |
|        |           |  [place]       | [address],  |                    |
|        |           | [place] [address] | Technologist         |                    |
|        |           |   [phone]       |                      |                    |
+--------+-----------+----------------------+----------------------+--------------------+
 
 
 
 Social History
 
 
+--------------+-------+-----------+--------+------+
| Tobacco Use  | Types | Packs/Day | Years  | Date |
|              |       |           | Used   |      |
+--------------+-------+-----------+--------+------+
| Never Smoker |       |           |        |      |
+--------------+-------+-----------+--------+------+
 
 
 
+---------------------+---+---+---+
| Smokeless Tobacco:  |   |   |   |
| Never Used          |   |   |   |
+---------------------+---+---+---+
 
 
 
+---------------------------------------------------------------+
| Comments: some second hand smoke exposure, but fairly minimal |
+---------------------------------------------------------------+
 
 
 
+-------------+-------------+---------+----------+
| Alcohol Use | Drinks/Week | oz/Week | Comments |
+-------------+-------------+---------+----------+
| No          |             |         |          |
+-------------+-------------+---------+----------+
 
 
 
+------------------+---------------+
| Sex Assigned at  | Date Recorded |
| Birth            |               |
+------------------+---------------+
| Not on file      |               |
+------------------+---------------+
 
 
 
+----------------+-------------+-------------+
| Job Start Date | Occupation  | Industry    |
+----------------+-------------+-------------+
| Not on file    | Not on file | Not on file |
+----------------+-------------+-------------+
 
 
 
+----------------+--------------+------------+
 
| Travel History | Travel Start | Travel End |
+----------------+--------------+------------+
 
 
 
+-------------------------------------+
| No recent travel history available. |
+-------------------------------------+
 documented as of this encounter
 
 Medications at Time of Discharge
 
 
+----------------------+----------------------+-----------+---------+----------+-----------+
| Medication           | Sig                  | Dispensed | Refills | Start    | End Date  |
|                      |                      |           |         | Date     |           |
+----------------------+----------------------+-----------+---------+----------+-----------+
|   cholecalciferol    | Take 2,000 Units by  |           | 0       |          |           |
| (VITAMIN D-3) 2000   | mouth Every other    |           |         |          |           |
| UNITS                | day.                 |           |         |          |           |
| TABSIndications:     |                      |           |         |          |           |
| Unspecified          |                      |           |         |          |           |
| hypertensive kidney  |                      |           |         |          |           |
| disease with chronic |                      |           |         |          |           |
|  kidney disease      |                      |           |         |          |           |
| stage I through      |                      |           |         |          |           |
| stage IV, or         |                      |           |         |          |           |
| unspecified(403.90), |                      |           |         |          |           |
|  FSGS (focal         |                      |           |         |          |           |
| segmental            |                      |           |         |          |           |
| glomerulosclerosis), |                      |           |         |          |           |
|  Hyperlipidemia,     |                      |           |         |          |           |
| Complications of     |                      |           |         |          |           |
| transplanted kidney  |                      |           |         |          |           |
+----------------------+----------------------+-----------+---------+----------+-----------+
|   glucose blood VI   | Check blood sugar    |   100     | 12      | 11/26/20 |           |
| test strips (ONE     | before each meal and | each      |         | 12       |           |
| TOUCH ULTRA TEST)    |  as directed         |           |         |          |           |
| stripIndications:    |                      |           |         |          |           |
| Unspecified          |                      |           |         |          |           |
| hypertensive kidney  |                      |           |         |          |           |
| disease with chronic |                      |           |         |          |           |
|  kidney disease      |                      |           |         |          |           |
| stage I through      |                      |           |         |          |           |
| stage IV, or         |                      |           |         |          |           |
| unspecified(403.90), |                      |           |         |          |           |
|  Complications of    |                      |           |         |          |           |
| transplanted kidney, |                      |           |         |          |           |
|  Diabetes mellitus   |                      |           |         |          |           |
| type II,             |                      |           |         |          |           |
| uncontrolled (HCC),  |                      |           |         |          |           |
| Hyperlipidemia       |                      |           |         |          |           |
+----------------------+----------------------+-----------+---------+----------+-----------+
|   Respiratory        | Decrease CPAP to     |   1 each  | 0       | 20 |           |
| Therapy Supplies     | 12-18 cmH2O          |           |         | 13       |           |
| MISC                 | Diagnosis            |           |         |          |           |
|                      | Code(s)327.23.       |           |         |          |           |
|                      | Please send order to |           |         |          |           |
|                      |  [facility].     |           |         |          |           |
+----------------------+----------------------+-----------+---------+----------+-----------+
 
|   allopurinol        | Take 1 tablet by     |   30      | 11      | 02/10/20 |  |
| (ZYLOPRIM) 100 mg    | mouth Daily.         | tablet    |         | 14       | 5         |
| tablet               |                      |           |         |          |           |
+----------------------+----------------------+-----------+---------+----------+-----------+
|   aspirin 81 MG EC   | Take 81 mg by mouth  |           | 0       | 20 |  |
| tablet               | Daily.               |           |         | 12       | 5         |
+----------------------+----------------------+-----------+---------+----------+-----------+
|   cinacalcet         | Take 1 tablet by     |   30      | 12      | 20 |  |
| (SENSIPAR) 30 mg     | mouth Daily.         | tablet    |         | 13       | 4         |
| tablet               |                      |           |         |          |           |
+----------------------+----------------------+-----------+---------+----------+-----------+
|   fludrocortisone    | Take 1 tablet by     |   45      | 2       | 20 |  |
| (FLORINEF) 0.1 mg    | mouth Every other    | tablet    |         | 14       | 5         |
| tablet               | day.                 |           |         |          |           |
+----------------------+----------------------+-----------+---------+----------+-----------+
|   fluticasone        | Inhale 1 puff into   |   1       | 11      | 20 |  |
| (FLOVENT HFA) 220    | the lungs 2 times    | Inhaler   |         | 13       | 5         |
| mcg/puff inhaler     | daily. Rinse mouth   |           |         |          |           |
|                      | after use.           |           |         |          |           |
+----------------------+----------------------+-----------+---------+----------+-----------+
|   furosemide (LASIX) | Take 1 tablet by     |   30      | 5       | 20 |  |
|  40 mg tablet        | mouth Daily.         | tablet    |         | 13       | 4         |
+----------------------+----------------------+-----------+---------+----------+-----------+
|   insulin glargine   | Inject 20 Units      |   10 mL   | 0       | 20 |  |
| (LANTUS) 100         | under the skin every |           |         | 13       | 4         |
| units/mL             |  morning.            |           |         |          |           |
| injectionIndications |                      |           |         |          |           |
| : Unspecified        |                      |           |         |          |           |
| hypertensive kidney  |                      |           |         |          |           |
| disease with chronic |                      |           |         |          |           |
|  kidney disease      |                      |           |         |          |           |
| stage I through      |                      |           |         |          |           |
| stage IV, or         |                      |           |         |          |           |
| unspecified(403.90), |                      |           |         |          |           |
|  Complications of    |                      |           |         |          |           |
| transplanted kidney, |                      |           |         |          |           |
|  Diabetes mellitus   |                      |           |         |          |           |
| type II,             |                      |           |         |          |           |
| uncontrolled ([facility]),  |                      |           |         |          |           |
| Hyperlipidemia       |                      |           |         |          |           |
+----------------------+----------------------+-----------+---------+----------+-----------+
|   insulin lispro     | Inject               |   10 pen  | 5       | 20 |  |
| (HUMALOG) 100        | subcutaneously       |           |         | 13       | 4         |
| units/mL injection   | before meals         |           |         |          |           |
|                      | according to sliding |           |         |          |           |
|                      |  scale               |           |         |          |           |
+----------------------+----------------------+-----------+---------+----------+-----------+
|   Insulin            | Use before meals and |   100     | 11      | 20 |  |
| Syringe-Needle U-100 |  as directed.        | each      |         | 13       | 4         |
|  (BD INSULIN SYRINGE |                      |           |         |          |           |
|  ULTRAFINE) 31G X    |                      |           |         |          |           |
| " 0.5 ML MISC    |                      |           |         |          |           |
+----------------------+----------------------+-----------+---------+----------+-----------+
|   levothyroxine      | Take 1 tablet by     |   30      | 11      | 20 | 10/06/201 |
| (LEVOTHROID) 50 mcg  | mouth Daily.         | tablet    |         | 13       | 4         |
| tablet               |                      |           |         |          |           |
+----------------------+----------------------+-----------+---------+----------+-----------+
|   lisinopril         | Take 1 tablet by     |   90      | 3       | 20 |  |
| (PRINIVIL,ZESTRIL)   | mouth Daily.         | tablet    |         | 13       | 4         |
| 30 MG tablet         |                      |           |         |          |           |
 
+----------------------+----------------------+-----------+---------+----------+-----------+
|   loperamide         | Take 2 mg by mouth   |           | 0       | 20 |  |
| (ANTI-DIARRHEAL) 2   | Daily as needed.     |           |         | 12       | 4         |
| mg capsule           |                      |           |         |          |           |
+----------------------+----------------------+-----------+---------+----------+-----------+
|   magnesium oxide    | Take 1 tablet by     |   62      | 12      | 20 |  |
| (MAG-OX) 400 mg      | mouth 2 times daily. | tablet    |         | 13       | 4         |
| tablet               |                      |           |         |          |           |
+----------------------+----------------------+-----------+---------+----------+-----------+
|   metoprolol         | Take 1 tablet by     |   60      | 11      | 20 |  |
| tartrate (LOPRESSOR) | mouth 2 times daily. | tablet    |         | 13       | 4         |
|  25 mg tablet        |                      |           |         |          |           |
+----------------------+----------------------+-----------+---------+----------+-----------+
|   mycophenolate      | Take 250 mg by mouth |           | 0       | 20 | 10/14/201 |
| (CELLCEPT) 250 mg    |  3 times daily.      |           |         | 11       | 5         |
| capsule              |                      |           |         |          |           |
+----------------------+----------------------+-----------+---------+----------+-----------+
|   omeprazole         | Take one capsule by  |   90      | 3       | 20 |  |
| (PRILOSEC) 20 mg     | mouth once daily on  | capsule   |         | 13       | 4         |
| capsule              | an empty stomach     |           |         |          |           |
+----------------------+----------------------+-----------+---------+----------+-----------+
|   predniSONE         | Take 1 tablet by     |   90      | 3       | 20 |  |
| (DELTASONE) 5 mg     | mouth Daily.         | tablet    |         | 14       | 5         |
| tablet               |                      |           |         |          |           |
+----------------------+----------------------+-----------+---------+----------+-----------+
|   Prenatal           | Take 0.8 mg by mouth |   30 each | 11      | 20 |  |
| Multivit-Min-Fe-FA   |  Daily.              |           |         | 13       | 4         |
| (PRENATAL VITAMINS)  |                      |           |         |          |           |
| 0.8 MG TABS          |                      |           |         |          |           |
+----------------------+----------------------+-----------+---------+----------+-----------+
|   Prenatal Vit-Fe    |                      |           | 0       | 20 |  |
| Fumarate-FA (PNV     |                      |           |         | 13       | 4         |
| PRENATAL PLUS        |                      |           |         |          |           |
| MULTIVITAMIN) 27-1   |                      |           |         |          |           |
| MG TABS              |                      |           |         |          |           |
+----------------------+----------------------+-----------+---------+----------+-----------+
|   rosuvastatin       | Take 1 tablet by     |   30      | 11      | 20 |  |
| (CRESTOR) 20 mg      | mouth nightly.       | tablet    |         | 13       | 4         |
| tablet               |                      |           |         |          |           |
+----------------------+----------------------+-----------+---------+----------+-----------+
|   tacrolimus         | TAD.                 |           | 0       | 20 | 07/15/201 |
| (PROGRAF) 0.5 mg     |                      |           |         | 12       | 4         |
| capsuleIndications:  |                      |           |         |          |           |
| Unspecified          |                      |           |         |          |           |
| hypertensive kidney  |                      |           |         |          |           |
| disease with chronic |                      |           |         |          |           |
|  kidney disease      |                      |           |         |          |           |
| stage I through      |                      |           |         |          |           |
| stage IV, or         |                      |           |         |          |           |
| unspecified(403.90), |                      |           |         |          |           |
|  Complications of    |                      |           |         |          |           |
| transplanted kidney, |                      |           |         |          |           |
|  Diabetes mellitus   |                      |           |         |          |           |
| type II,             |                      |           |         |          |           |
| uncontrolled (HCC),  |                      |           |         |          |           |
| Hyperlipidemia       |                      |           |         |          |           |
+----------------------+----------------------+-----------+---------+----------+-----------+
|   tacrolimus         | Take 1.5 mg by mouth |           | 0       | 20 |  |
| (PROGRAF) 1 mg       |  2 times daily.      |           |         | 13       | 4         |
| capsuleIndications:  |                      |           |         |          |           |
 
| Unspecified          |                      |           |         |          |           |
| hypertensive kidney  |                      |           |         |          |           |
| disease with chronic |                      |           |         |          |           |
|  kidney disease      |                      |           |         |          |           |
| stage I through      |                      |           |         |          |           |
| stage IV, or         |                      |           |         |          |           |
| unspecified(403.90), |                      |           |         |          |           |
|  FSGS (focal         |                      |           |         |          |           |
| segmental            |                      |           |         |          |           |
| glomerulosclerosis), |                      |           |         |          |           |
|  Hyperlipidemia,     |                      |           |         |          |           |
| Complications of     |                      |           |         |          |           |
| transplanted kidney  |                      |           |         |          |           |
+----------------------+----------------------+-----------+---------+----------+-----------+
|   valsartan (DIOVAN) | Take 1 tablet by     |   30      | 11      | 20 |  |
|  160 mg tablet       | mouth Daily.         | tablet    |         | 14       | 4         |
+----------------------+----------------------+-----------+---------+----------+-----------+
 documented as of this encounter
 
 Plan of Treatment
 
 
+--------+-----------+------------+----------------------+-------------------+
| Date   | Type      | Specialty  | Care Team            | Description       |
+--------+-----------+------------+----------------------+-------------------+
| / | Office    | Cardiology |   Tonia Wilson,    |                   |
| 2020   | Visit     |            | ARNP  [address]   |                   |
|        |           |            | St  [place], [place]  |                   |
|        |           |            | [address]  [phone]  |                   |
|        |           |            |  [phone] (Fax)  |                   |
+--------+-----------+------------+----------------------+-------------------+
| / | Hospital  | Radiology  |   Popeye Townsend, |                   |
|    | Encounter |            |  MD  [address] |                   |
|        |           |            |  [place],   |                   |
|        |           |            | [place] [address]             |                   |
|        |           |            | [phone]         |                   |
|        |           |            | [phone] (Fax)   |                   |
+--------+-----------+------------+----------------------+-------------------+
| / | Surgery   | Radiology  |   Popeye Townsend, | CV EP PPM SYSTEM  |
|    |           |            |  MD  [address] | IMPLANT           |
|        |           |            |  St.  [place],   |                   |
|        |           |            | [place] [address]             |                   |
|        |           |            | [phone]         |                   |
|        |           |            | [phone] (Fax)   |                   |
+--------+-----------+------------+----------------------+-------------------+
| 02/10/ | Clinical  | Cardiology |                      |                   |
|    | Support   |            |                      |                   |
+--------+-----------+------------+----------------------+-------------------+
| / | Office    | Cardiology |   Tonia Wilson,    |                   |
|    | Visit     |            | [facility]  [address]   |                   |
|        |           |            | [place]  |                   |
|        |           |            | [address]  [phone]  |                   |
|        |           |            |  [phone] (Fax)  |                   |
+--------+-----------+------------+----------------------+-------------------+
| / | Off-Site  | Nephrology |   Marzena Moser  |                   |
|    | Visit     |            | DO [facility]  [address] [place] [address]      |                   |
|        |           |            | [address]      |                   |
|        |           |            | [place]      |                   |
|        |           |            | [address]  [phone]  |                   |
|        |           |            |  [phone] (Fax)  |                   |
 
+--------+-----------+------------+----------------------+-------------------+
 documented as of this encounter
 
 Procedures
 
 
+----------------+--------+-------------+----------------------+----------------------+
| Procedure Name | Priori | Date/Time   | Associated Diagnosis | Comments             |
|                | ty     |             |                      |                      |
+----------------+--------+-------------+----------------------+----------------------+
| ECHO COMPLETE  | Routin | 04/15/2014  |   Pulmonary          |   Results for this   |
|                | e      |  3:55 PM    | hypertension (HCC)   | procedure are in the |
|                |        | PDT         |                      |  results section.    |
+----------------+--------+-------------+----------------------+----------------------+
 documented in this encounter
 
 Results
 ECHO Complete (04/15/2014  3:55 PM PDT)
 
+----------+
| Specimen |
+----------+
|          |
+----------+
 
 
 
+------------------------------------------------------------------------+-----------------+
| Narrative                                                              | Performed At    |
+------------------------------------------------------------------------+-----------------+
|   [facility]     ECHOCARDIOGRAM REPORT         |   [facility]    |
| STUDY DATE:   4/15/2014        PATIENT NAME: Viviana Ricci  :      | [facility]        |
| 1945  MRN: [id number]  PCP: Marzena Moser, DO              | [facility]  |
| CLINICAL HISTORY/DIAGNOSIS:   PULM HTN     A transthoracic             | - IMAGING       |
| echocardiogram with M-mode, pulsed-wave and color Doppler   was        |                 |
| performed with standard views obtained.   The technical quality of     |                 |
| this   examination is adequate.   The heart rhythm during the echo is  |                 |
| sinus   rhythm.   The M-mode, two-dimensional, color flow and spectral |                 |
|  Doppler data   were reviewed and support the following                |                 |
| interpretation:     Interpretation:  Left Atrium: Mildly dilated  Left |                 |
|  ventricle:   Left ventricular size is normal with normal wall         |                 |
| thickness and motion, and normal left ventricular systolic function.   |                 |
|   The   estimated ejection fraction is 70-75 %.   Grade 1 left         |                 |
| ventricular   diastolic dysfunction.   Aortic root: Aortic root is     |                 |
| normal.  Right Atrium:   Right atrial sizes normal.   Normal           |                 |
| right-sided pressure.  Right ventricle:   Right ventricular size is    |                 |
| normal with normal wall   thickness and normal right ventricular       |                 |
| systolic function.  Pericardium:   Pericardium is normal.  Pulmonary   |                 |
| artery:   Pulmonary artery is normal. normal right-sided pressure      |                 |
| Aortic valve:   Aortic valve is trileaflet with mild thickening and    |                 |
| opens   normally.   Mild aortic valve insufficiency  Mitral valve:     |                 |
|   Mitral valve is normal. mild mitral valve regurgitation  Pulmonic    |                 |
| valve:   Pulmonic valve is normal.  Tricuspid valve:   Tricuspid valve |                 |
|  is normal. mild tricuspid valve   regurgitation  Vena cava: Not seen  |                 |
|        IMPRESSIONS:  1.   Mild left atrial dilatation.  2.   Normal    |                 |
| left ventricular size, wall thickness and motion.   Preserved   left   |                 |
| ventricular systolic function.   LVEF is 70-75%.  3.   Grade 1 left    |                 |
| ventricular diastole dysfunction.  4.   Mild tricuspid valve           |                 |
| regurgitation.  5.   Mild thickened trileaflet aortic valve with       |                 |
| adequate opening.   A mild   aortic valve insufficiency.  6.   Normal  |                 |
 
| right-sided pressure.              Measurements:  Height: 66  Weight:  |                 |
| 277  Aortic root:   37 mm  Aortic cusp sep:   18 mm  LA:   48 mm       |                 |
| IVS-diastole:   11 mm  IVS-systole:   20 mm  LVPW diastole:   11 mm    |                 |
| LVPW systole:   18 mm  LV diameter-diastole:   52 mm  LV               |                 |
| diameter-systole:   28 mm  Fractional shortenin %  PFV aortic   |                 |
| valve:    m/s  MPG mitral valve:    mmHg  PFV TR jet:   2.25 m/s       |                 |
| RA/RV PP mmHg  LA volume:   50 mL  LA index:   22 mL/m2  Mitral |                 |
|  Inflow DT:   290 ms  IVRT:   110 ms  Valsalva:   NOT NEEDED  PWDTI S  |                 |
| wave:   6.7 cm/s  PWDTI E wave:   5.6 cm/s  PWDTI A wave:   5.0 cm/s   |                 |
| E/A Ratio:   1.120  E/E Ratio:   9.45              Signed by:          |                 |
| Popeye Townsend MD [facility]     4/15/2014   16:02           Sonographer: |                 |
|    Jn Trinh, RDCS, RVT, RDMS                                    |                 |
+------------------------------------------------------------------------+-----------------+
 
 
 
+-------------------------------------------------------------------------------------------
----------------------------------+
| Procedure Note                                                                            
                                  |
+-------------------------------------------------------------------------------------------
----------------------------------+
|   Popyee Townsend MD - 04/15/2014  4:20 PM [facility]            
                                  |
| CENTERECHOCARDIOGRAM REPORTSTUDY DATE:  4/15/2014PATIENT NAME: Viviana Hernandez:         
                                  |
| 1945MRN: [id number]: Marzena Moser, DOCLINICAL HISTORY/DIAGNOSIS:  PULM    
                                  |
| HTNA transthoracic echocardiogram with M-mode, pulsed-wave and color Doppler was          
                                  |
| performed with standard views obtained.  The technical quality of this examination is     
                                  |
| adequate.  The heart rhythm during the echo is sinus rhythm.  The M-mode,                 
                                  |
| two-dimensional, color flow and spectral Doppler data were reviewed and support the       
                                  |
| following interpretation:Interpretation:Left Atrium: Mildly dilatedLeft ventricle:  Left  
                                  |
|  ventricular size is normal with normal wall thickness and motion, and normal left        
                                  |
| ventricular systolic function.  The estimated ejection fraction is 70-75 %.  Grade 1      
                                  |
| left ventricular diastolic dysfunction. Aortic root: Aortic root is normal.Right Atrium:  
                                  |
|   Right atrial sizes normal.  Normal right-sided pressure.Right ventricle:  Right         
                                  |
| ventricular size is normal with normal wall thickness and normal right ventricular        
                                  |
| systolic function.Pericardium:  Pericardium is normal.Pulmonary artery:  Pulmonary        
                                  |
| artery is normal. normal right-sided pressureAortic valve:  Aortic valve is trileaflet    
                                  |
| with mild thickening and opens normally.  Mild aortic valve insufficiencyMitral valve:    
                                  |
| Mitral valve is normal. mild mitral valve regurgitationPulmonic valve:  Pulmonic valve    
                                  |
| is normal.Tricuspid valve:  Tricuspid valve is normal. mild tricuspid valve               
                                  |
| regurgitationVena cava: Not seenIMPRESSIONS:1.  Mild left atrial dilatation.2.  Normal    
                                  |
 
| left ventricular size, wall thickness and motion.  Preserved left ventricular systolic    
                                  |
| function.  LVEF is 70-75%.3.  Grade 1 left ventricular diastole dysfunction.4.  Mild      
                                  |
| tricuspid valve regurgitation.5.  Mild thickened trileaflet aortic valve with adequate    
                                  |
| opening.  A mild aortic valve insufficiency.6.  Normal right-sided                        
                                  |
| pressure.Measurements:Height: 66Weight: 277Aortic root:  37 mmAortic cusp sep:  18 mmLA:  
                                  |
|   48 mmIVS-diastole:  11 mmIVS-systole:  20 mmLVPW diastole:  11 mmLVPW systole:  18      
                                  |
| mmLV diameter-diastole:  52 mmLV diameter-systole:  28 mmFractional shortenin %PFV  
                                  |
|  aortic valve:   m/sMPG mitral valve:   mmHgPFV TR jet:  2.25 m/Brooke/RV PP mmHgLA    
                                  |
| volume:  50 mLLA index:  22 mL/[id number] Inflow DT:  290 msIVRT:  110 msValsalva:  NOT     
                                  |
| NEEDEDPWDTI S wave:  6.7 cm/sPWDTI E wave:  5.6 cm/sPWDTI A wave:  5.0 cm/sE/A Ratio:     
                                  |
| 1.120E/E Ratio:  9.45Signed by:   Popeye Townsend MD [facility]  4/15/2014  16:02             
                                  |
| Sonographer:  Jn Trinh, PHU, RVT, RDMS                                            
                                  |
|IMPRESSIONS:                                                                               
                                 |
|1.  Mild left atrial dilatation.                                                           
                                  |
|2.  Normal left ventricular size, wall thickness and motion.  Preserved left ventricular sy
stolic function.  LVEF is 70-75%. |
|3.  Grade 1 left ventricular diastole dysfunction.                                         
                                  |
|4.  Mild tricuspid valve regurgitation.                                                    
                                  |
|5.  Mild thickened trileaflet aortic valve with adequate opening.  A mild aortic valve insu
fficiency.                        |
|6.  Normal right-sided pressure.                                                           
                                  |
|Measurements:                                                                              
                                 |
|Height: 66                                                                                 
                                  |
|Weight: 277                                                                                
                                  |
|Aortic root:  37 mm                                                                        
                                  |
|Aortic cusp sep:  18 mm                                                                    
                                  |
 
|LA:  48 mm                                                                                 
                                  |
|IVS-diastole:  11 mm                                                                       
                                  |
|IVS-systole:  20 mm                                                                        
                                  |
|LVPW diastole:  11 mm                                                                      
                                  |
|LVPW systole:  18 mm                                                                       
                                  |
|LV diameter-diastole:  52 mm                                                               
                                  |
|LV diameter-systole:  28 mm                                                                
                                  |
|Fractional shortenin %                                                               
                                  |
|PFV aortic valve:   m/s                                                                    
                                  |
|MPG mitral valve:   mmHg                                                                   
                                  |
|PFV TR jet:  2.25 m/s                                                                      
                                  |
|RA/RV PP mmHg                                                                        
                                  |
|LA volume:  50 mL                                                                          
                                  |
|LA index:  22 mL/m2                                                                        
                                  |
|Mitral Inflow DT:  290 ms                                                                  
                                  |
|IVRT:  110 ms                                                                              
                                  |
|Valsalva:  NOT NEEDED                                                                      
                                  |
|PWDTI S wave:  6.7 cm/s                                                                    
                                  |
|PWDTI E wave:  5.6 cm/s                                                                    
                                  |
|PWDTI A wave:  5.0 cm/s                                                                    
                                  |
|E/A Ratio:  1.120                                                                          
                                  |
|E/E Ratio:  9.45                                                                           
                                  |
|Signed by:   Popeye Townsend MD [facility]                                                     
                                  |
|  4/15/2014  16:02                                                                         
                                  |
 
|Sonographer:  Jn Trinh, HILDACS, RVT, RDMS                                             
                                  |
+-------------------------------------------------------------------------------------------
----------------------------------+
 
 
 
+----------------------+---------------------+--------------------+----------------+
| Performing           | Address             | City/State/Zipcode | Phone Number   |
| Organization         |                     |                    |                |
+----------------------+---------------------+--------------------+----------------+
|   [facility]     |   [address] | [place]    |   [phone] |
| [facility]  |                     | [address]              |                |
| - IMAGING            |                     |                    |                |
+----------------------+---------------------+--------------------+----------------+
 documented in this encounter
 
 Visit Diagnoses
 
 
+------------------------------------------------------------------------+
| Diagnosis                                                              |
+------------------------------------------------------------------------+
|   Pulmonary hypertension (HCC)  Other chronic pulmonary heart diseases |
+------------------------------------------------------------------------+
 documented in this encounter

## 2020-01-08 NOTE — XMS
Encounter Summary
  Created on: 2020
 
 Viviana Ricci
 External Reference #: [id number]
 : 47
 Sex: Female
 
 Demographics
 
 
+-----------------------+----------------------+
| Address               | [address]      |
|                       | [place]  [address] |
+-----------------------+----------------------+
| Home Phone            | [phone]      |
+-----------------------+----------------------+
| Preferred Language    | Unknown              |
+-----------------------+----------------------+
| Marital Status        | Single               |
+-----------------------+----------------------+
| [employer]iliation | [id number]                 |
+-----------------------+----------------------+
| Race                  | Unknown              |
+-----------------------+----------------------+
| Ethnic Group          | Unknown              |
+-----------------------+----------------------+
 
 
 Author
 
 
+--------------+--------------------------------------------+
| Author       | [facility]  |
|              | and [facility]                                |
+--------------+--------------------------------------------+
| Organization | [facility]  |
|              | and [facility]                                |
+--------------+--------------------------------------------+
| Address      | Unknown                                    |
+--------------+--------------------------------------------+
| Phone        | Unavailable                                |
+--------------+--------------------------------------------+
 
 
 
 Support
 
 
+----------------+--------------+---------------------+-----------------+
| Name           | Relationship | Address             | Phone           |
+----------------+--------------+---------------------+-----------------+
| Ada/Ed Radhames | ECON         | [address]              | [phone] |
|                |              | [place], [place]  |                 |
|                |              |  [address]              |                 |
+----------------+--------------+---------------------+-----------------+
 
 
 
 
 Care Team Providers
 
 
+-----------------------+------+-------------+
| Care Team Member Name | Role | Phone       |
+-----------------------+------+-------------+
 PCP  | Unavailable |
+-----------------------+------+-------------+
 
 
 
 Reason for Referral
 Diagnostic/Screening (Routine)
 
+--------+--------+-----------+--------------+--------------+---------------+
| Status | Reason | Specialty | Diagnoses /  | Referred By  | Referred To   |
|        |        |           | Procedures   | Contact      | Contact       |
+--------+--------+-----------+--------------+--------------+---------------+
| Closed |        | Radiology |   Diagnoses  |   Katie,  |   [facility]    |
|        |        |           |  Coronary    | Tonia, ARNP   | [address]  |
|        |        |           | artery       | [address] |  [place], |
|        |        |           | disease      |  St  [place]   |  [place]           |
|        |        |           | involving    | [place]    | [address]    |
|        |        |           | native       | [address]        | Phone:        |
|        |        |           | coronary     | Phone:       | [phone]  |
|        |        |           | artery of    | [phone] |  Fax:         |
|        |        |           | native heart |   Fax:       | [phone]  |
|        |        |           |  without     | [phone] |               |
|        |        |           | angina       |              |               |
|        |        |           | pectoris     |              |               |
|        |        |           | Hypertension |              |               |
|        |        |           | , essential  |              |               |
|        |        |           |  Mixed       |              |               |
|        |        |           | hyperlipidem |              |               |
|        |        |           | ia  Aortic   |              |               |
|        |        |           | valve        |              |               |
|        |        |           | insufficienc |              |               |
|        |        |           | y,           |              |               |
|        |        |           | unspecified  |              |               |
|        |        |           | etiology     |              |               |
|        |        |           | Pulmonary    |              |               |
|        |        |           | hypertension |              |               |
|        |        |           |  (HCC)       |              |               |
|        |        |           | Procedures   |              |               |
|        |        |           | ECHO         |              |               |
|        |        |           | Complete  [place] |              |               |
|        |        |           |  ECHO HEART  |              |               |
|        |        |           | XTHORACIC,CO |              |               |
|        |        |           | MPLETE W     |              |               |
|        |        |           | DOPPLER  [place]  |              |               |
|        |        |           | ECHO HEART   |              |               |
|        |        |           | XTHORACIC,CO |              |               |
|        |        |           | MPLETE, W/O  |              |               |
|        |        |           | DOPPLER      |              |               |
+--------+--------+-----------+--------------+--------------+---------------+
 
 
 
 
 
 Reason for Visit
 
 
+------------------+----------+
| Reason           | Comments |
+------------------+----------+
| Follow-up        |          |
+------------------+----------+
| Coronary Artery  |          |
| Disease          |          |
+------------------+----------+
| Hypertension     |          |
+------------------+----------+
| Hyperlipidemia   |          |
+------------------+----------+
 
 
 
 Encounter Details
 
 
+--------+---------+----------------------+----------------------+----------------------+
| Date   | Type    | Department           | Care Team            | Description          |
+--------+---------+----------------------+----------------------+----------------------+
| / | Office  |   [facility]          |   Tonia Wilson,    | Coronary artery      |
| 2017   | Visit   | CARDIOLOGY  [address]    | ARNP  [address]   | disease involving    |
|        |         | [address]  [place], | [place]  [place]  | native coronary      |
|        |         |  [place] [address]       | [address]  [phone]  | artery of native     |
|        |         | [phone]         |  [phone] (Fax)  | heart without angina |
|        |         |                      |                      |  pectoris (Primary   |
|        |         |                      |                      | Dx); Hypertension,   |
|        |         |                      |                      | essential; Mixed     |
|        |         |                      |                      | hyperlipidemia;      |
|        |         |                      |                      | Aortic valve         |
|        |         |                      |                      | insufficiency,       |
|        |         |                      |                      | unspecified          |
|        |         |                      |                      | etiology; Pulmonary  |
|        |         |                      |                      | hypertension (HCC)   |
+--------+---------+----------------------+----------------------+----------------------+
 
 
 
 Social History
 
 
+--------------+-------+-----------+--------+------+
| Tobacco Use  | Types | Packs/Day | Years  | Date |
|              |       |           | Used   |      |
+--------------+-------+-----------+--------+------+
| Never Smoker |       |           |        |      |
+--------------+-------+-----------+--------+------+
 
 
 
+---------------------+---+---+---+
| Smokeless Tobacco:  |   |   |   |
| Never Used          |   |   |   |
+---------------------+---+---+---+
 
 
 
 
+---------------------------------------------------------------+
| Comments: some second hand smoke exposure, but fairly minimal |
+---------------------------------------------------------------+
 
 
 
+-------------+-------------+---------+----------+
| Alcohol Use | Drinks/Week | oz/Week | Comments |
+-------------+-------------+---------+----------+
| No          |             |         |          |
+-------------+-------------+---------+----------+
 
 
 
+------------------+---------------+
| Sex Assigned at  | Date Recorded |
| Birth            |               |
+------------------+---------------+
| Not on file      |               |
+------------------+---------------+
 
 
 
+----------------+-------------+-------------+
| Job Start Date | Occupation  | Industry    |
+----------------+-------------+-------------+
| Not on file    | Not on file | Not on file |
+----------------+-------------+-------------+
 
 
 
+----------------+--------------+------------+
| Travel History | Travel Start | Travel End |
+----------------+--------------+------------+
 
 
 
+-------------------------------------+
| No recent travel history available. |
+-------------------------------------+
 documented as of this encounter
 
 Last Filed Vital Signs
 
 
+-------------------+-------------------+----------------------+----------+
| Vital Sign        | Reading           | Time Taken           | Comments |
+-------------------+-------------------+----------------------+----------+
| Blood Pressure    | 98/60             | 2017  2:06 PM  |          |
|                   |                   | PST                  |          |
+-------------------+-------------------+----------------------+----------+
| Pulse             | 62                | 2017  2:06 PM  | regular  |
|                   |                   | PST                  |          |
+-------------------+-------------------+----------------------+----------+
| Temperature       | -                 | -                    |          |
+-------------------+-------------------+----------------------+----------+
| Respiratory Rate  | 16                | 2017  2:06 PM  |          |
|                   |                   | PST                  |          |
+-------------------+-------------------+----------------------+----------+
 
| Oxygen Saturation | -                 | -                    |          |
+-------------------+-------------------+----------------------+----------+
| Inhaled Oxygen    | -                 | -                    |          |
| Concentration     |                   |                      |          |
+-------------------+-------------------+----------------------+----------+
| Weight            | 121.1 kg (267 lb) | 2017  2:06 PM  |          |
|                   |                   | PST                  |          |
+-------------------+-------------------+----------------------+----------+
| Height            | 167.6 cm (5' 6")  | 2017  2:06 PM  |          |
|                   |                   | PST                  |          |
+-------------------+-------------------+----------------------+----------+
| Body Mass Index   | 43.09             | 2017  2:06 PM  |          |
|                   |                   | PST                  |          |
+-------------------+-------------------+----------------------+----------+
 documented in this encounter
 
 Progress Notes
 Tonia Wilson ARNP - 2017  2:10 PM PSTFormatting of this note might be different fr
om the original.
    
 
 PATIENT NAME:  Viviana Ricci  
 : 1945:         AGE: 71 y.o.
  PRIMARY CARE:
 Marzena Moser DO
 CC:    
 
 OUTPATIENT FOLLOW UP VISIT
 
 Date of Service: 2017 
 
 HISTORY OF PRESENT ILLNESS:
 Viviana Ricci is a 71 y.o. female with a history of CAD post CABG x 3 in , history of 
diabetes, renal failure post a renal transplantation, morbid obesity, inactivity, hypertensi
on, hyperlipidemia, pulmonary hypertension and severe arthritis.  She is being seen today fo
r follow up coronary artery disease.
 
 She was last seen 12/08/15 at which time she had no changes in her medical regimen.  Since 
that time, she feels that she would be doing very well if "all my joints could be taken apar
t and put back together in better shape".  She has had a poor energy level since following a
 calorie restricted diet the past month.  She has not been very active. She is limited by ba
ck pain, hip pain, shoulder pain.  She uses 4 wheeled walker with seat in the home, and fron
t-wheeled walker outside of the home.  She enjoys visiting family in her spare time, and rec
ently saw her nephew act in [place], the play.  She has not had any chest pain or discomfort
 at rest or with exertion.   She has had shortness of breath with being outisde in the cold,
 but is fine inside or if it is warm outside.  She has not had any lightheadedness or dizzin
ess.  She has not noticed palpitations.  She has noticed mild swelling at ankles by the end 
of the day that is resolved in the morning, which has been stable for her.  Just on the left
 leg where she had a previous blood clot.  She sleeps on 1 and 1/2 pillows at night without 
any shortness of breath.  She sleeps with CPAP machine in place nightly with oxygen bled in 
at 2 liters per minute.
 
 MEDICAL, SURGICAL, AND PERSONAL HISTORY
 Past Medical, Surgical, Family, and Social History are reviewed in EPIC.
 
 CURRENT PROBLEMS
 Patient Active Problem List 
 Diagnosis 
   Chronic low back pain 
   Hypothyroidism 
 
   Mixed hyperlipidemia 
   Complications of transplanted kidney 
   Movement disorder 
   Diabetes mellitus type II, uncontrolled  
   Pulmonary hypertension  
   Coronary artery disease involving native coronary artery of native heart without angina
 pectoris 
   JACK on CPAP 
   Obesity hypoventilation syndrome  
   Kidney replaced by transplant 
   Secondary hyperparathyroidism  
   Dyspnea 
   FSGS (focal segmental glomerulosclerosis) 
   Murmur 
   Cardiomegaly 
   Hypertension, essential 
   PLMD (periodic limb movement disorder) 
   Chest pain 
   UTI (lower urinary tract infection) 
   Renal transplant recipient 
   Thyroid activity decreased 
   Type 2 DM with CKD stage 2 and hypertension  
 
 CURRENT MEDICATIONS
 Current Outpatient Prescriptions 
 Medication Sig Dispense Refill 
   allopurinol (ZYLOPRIM) 100 mg tablet Take 1 tablet by mouth Daily. 30 tablet 11 
   aspirin 81 mg EC tablet Take 81 mg by mouth Daily.   
   cholecalciferol (VITAMIN D-3) 2000 UNITS TABS Take 5,000 Units by mouth Every other day
.   
   cinacalcet (SENSIPAR) 30 mg tablet Take 1 tablet by mouth Daily. 30 tablet 11 
   fludrocortisone (FLORINEF) 0.1 mg tablet Take 1 tablet by mouth Every other day. 90 tab
let 4 
   furosemide (LASIX) 40 mg tablet Take 1 tablet by mouth Daily as needed. 30 tablet 5 
   glucose blood VI test strips (ONE TOUCH ULTRA TEST) strip Check blood sugar before each
 meal and as directed 100 each 12 
   insulin glargine (LANTUS) 100 units/mL injection (vial) Inject 20 Units under the skin 
every morning. 10 mL 11 
   insulin lispro (HUMALOG) 100 units/mL injection (vial) Inject subcutaneously before sara
ls according to sliding scale 10 vial 11 
   Insulin Syringe-Needle U-100 (BD INSULIN SYRINGE ULTRAFINE) 31G X 5/16" 0.5 ML MISC Use
 before meals and as directed. 100 each 11 
   levothyroxine (LEVOTHROID) 50 mcg tablet Take 1 tablet by mouth Daily. 30 tablet 11 
   lisinopril (PRINIVIL,ZESTRIL) 30 MG tablet Take 1 tablet by mouth Daily. 90 tablet 3 
   loperamide (ANTI-DIARRHEAL) 2 mg capsule Take 1 capsule by mouth 4 times daily as neede
d. 60 capsule 5 
   losartan (COZAAR) 50 mg tablet Take 1 tablet by mouth Daily. 90 tablet 3 
   magnesium oxide (MAG-OX) 400 mg tablet Take 1 tablet by mouth 2 times daily. 60 tablet 
11 
   metoprolol tartrate (LOPRESSOR) 25 mg tablet Take 1 tablet by mouth 2 times daily. 60 t
ablet 11 
   mycophenolate (CELLCEPT) 250 mg capsule TAKE (1) CAPSULE BY MOUTH THREE TIMES DAILY. 90
 capsule 11 
   omeprazole (PRILOSEC) 20 mg capsule Take one capsule by mouth once daily on an empty st
omach 90 capsule 4 
   predniSONE (DELTASONE) 5 mg tablet Take 1 tablet by mouth Daily. 90 tablet 3 
   Prenatal Multivit-Min-Fe-FA (PRENATAL VITAMINS) 0.8 MG TABS Take 0.8 mg by mouth Daily.
 30 each 11 
   Respiratory Therapy Supplies MISC Decrease CPAP to 12-18 cmH2O Diagnosis Code(s)327.23.
 Please send order to [facility]. 1 each 0 
 
   rosuvastatin (CRESTOR) 20 mg tablet Take 1 tablet by mouth nightly. 30 tablet 11 
   tacrolimus (PROGRAF) 0.5 mg capsule TAKE (1) CAPSULE TWICE DAILY WITH 1MG CAPSULE (TOTA
L DOSE = 1.5MG TWICE DAILY). 60 capsule 11 
   tacrolimus (PROGRAF) 1 mg capsule TAKE (1) CAPSULE TWICE DAILY WITH 0.5MG CAPSULE (TOTA
L DOSE = 1.5MG TWICE DAILY) 60 capsule 11 
 
 No current facility-administered medications for this visit. 
  
 
 ALLERGIES
 No Known Allergies
 
 ROS
 Review of Systems 
 Constitutional: Positive for malaise/fatigue. 
 Respiratory: Negative for shortness of breath (only in the cold).  
 Cardiovascular: Positive for leg swelling. Negative for chest pain, palpitations, orthopnea
 and PND. 
 Gastrointestinal: Negative for abdominal pain. 
 Neurological: Negative for dizziness and loss of consciousness. 
  
 
 OBJECTIVE:  
 
 PHYSICAL EXAM
 BP 98/60 mmHg | Pulse 62 | Resp 16 | Ht 1.676 m (5' 6") | Wt 121.11 kg (267 lb) | BMI 43.12
 kg/m2
 Physical Exam 
 Constitutional: She is oriented to person, place, and time. She appears well-developed and 
well-nourished. 
 Adult female in no acute distress 
 Neck: Normal carotid pulses and no JVD (Difficult to fully evaluate due to body habitus) pr
esent. Carotid bruit is not present. 
 Cardiovascular: Normal rate, regular rhythm, S1 normal, S2 normal and intact distal pulses.
  PMI is not displaced.  Exam reveals distant heart sounds (due to girth). Exam reveals no g
allop and no friction rub.  
 Murmur heard.
  Holosystolic murmur is present with a grade of 1/6 
 Pulses:
      Carotid pulses are 2+ on the right side, and 2+ on the left side.
      Posterior tibial pulses are 1+ on the right side, and 1+ on the left side. 
 Pulmonary/Chest: Effort normal and breath sounds normal. No accessory muscle usage. No resp
iratory distress. She has no wheezes. She has no rhonchi. She has no rales. 
 Abdominal: Soft. Normal appearance and normal aorta. She exhibits no abdominal bruit. There
 is no hepatosplenomegaly (difficult to fully evaluate due to body habitus). There is no ten
derness. 
 Musculoskeletal: She exhibits edema (trace at left ankle). 
 Neurological: She is alert and oriented to person, place, and time. Gait (using front wheel
ed walker ) abnormal. 
 Skin: Skin is warm and dry. No cyanosis. Nails show no clubbing. 
 Psychiatric: She has a normal mood and affect. Her mood appears not anxious. She does not e
xhibit a depressed mood. 
 
 ECG: I personally independently reviewed ECG tracing during this visit (interpreted and cherise
led by another provider):
 Results for orders placed or performed in visit on 12/08/15 
 ECG 12 lead 
 Result Value Ref Range 
  INTERPRETATION TEXT   
   Normal sinus rhythm
 
 Left axis deviation
 Abnormal ECG
 When compared with ECG of 08-DEC-2015 15:25, (Unconfirmed)
 No significant change was found
 Confirmed by ABEBE TOWNSEND MD ([id number]) on 2015 5:27:44 PM
  
   Which is compared to today's ECG 2017: Sinus rhythm, rate 76 bpm, unchanged 
 
 LAB RESULTS reviewed during visit today primarily from [facility]: 
 LIPID
 Lab Results 
 Component Value Date 
  CHOL 162 2013 
  TRIG 225 2013 
  HDL 45 2013 
  LDL 72 2013 
  LDLEX 55 2017 
  HDLEX 50.2 2017 
  TRIGEX 190* 2017 
  CHOLEX 143 2017 
 
 CHEMISTRY
 Lab Results 
 Component Value Date 
   2014 
  GLUEX 129* 2017 
   2014 
  NAEX 142 2017 
  K 5.4 2014 
  KEX 5.1 2017 
   2014 
  CLEX 110 2017 
  CO2 22 2014 
  CO2EX 19 2017 
  CALCIUM 10.4 2014 
  ALKPHOS 100 2014 
  AST 20 07/10/2013 
  ASTEX 22 2017 
  ALT 14 07/10/2013 
  ALTEX 16 2017 
  BILITOT 0.6 2014 
  CREA 1.7 07/10/2013 
  BUN 40 2014 
  EGFR 31.0 07/10/2013 
  EGFREX 37 2017 
  CREEX 1.4* 2017 
 
 HEMATOLOGY
 Lab Results 
 Component Value Date 
  WBC 4.6 07/10/2013 
  WBCEX 5.5 2017 
  HGB 11.9 07/10/2013 
  HGBEX 13.8 2017 
  HCT 35.7 07/10/2013 
  HCTEX 43.3 2017 
  * 07/10/2013 
  PLTEX 169 2017 
  
 
 
 I reviewed records from PCP for office visit on 17  regarding multiple medical problems
 including HTN at which time she was noted to have good control. 
 
 Above data and testing is reviewed this visit; testing below is historical data unless othe
rwise specified.
 ASSESSMENT:  
 
 1. Coronary artery disease:
 A.  Status post CABG x 3 in  with LIMA to the LAD, SVG to the OM1 and OM2.
             B.  A persantine sestamibi stress test on 07 revealed a medium sized, mod
erate in severity fixed defect of the anterior wall, and a small sized mild in severity fixe
d defect of the inferior wall, LVEF by gated SPECT was 66%.
  C.  Bilateral heart catheterization on 05/03/10, shows severe two vessel coronary artery d
isease, a chronic occlusion through the proximal portion of the left anterior descending art
adele and a chronic occlusion through the proximal portion of the left circumflex artery, rani
ts: open left internal mammary artery graft to the mid left anterior descending artery, open
 saphenous vein grafts to the OM1 and OM2, mildly dilated left ventricular cavity with a nor
mal left systolic function, LVEF 70%, mild to moderate pulmonary hypertension and a normal c
ardiac output, coronary circulation is right dominant, normal system pressure.
  D.  Persantine nuclear medicine stress test 14 shows a normal myocardial perfusion im
aging study with normal left ventricular size, wall thickness, LVEF by gated SPECT of 78%.  
  E.  Today she denies any chest pain. There is no signs and symptoms of overt congestive he
art failure.  She is in a class II of [employer] [place] Heart [employer] functional class.  There is
 trace left lower extremity edema on physical examination, which is chronic and unchanged. 
 
 2.  Right sided heart failure/ cor pulmonale / severe pulmonary hypertension:
             A. The echocardiogram from 02/19/10 revealed moderate bi-atrial dilatation and 
normal left ventricular size, wall thickness and motion, LVEF is 55-60%, dilated right ventr
icle with moderate hypo-kinesis, moderate tricuspid valve regurgitation, severe pulmonary hy
pertension with a peak systolic pressure of 80 mmHg, and a small pericardial effusion. 
  B. Echocardiogram from 4/15/14 showed Mild left atrial dilatation. Normal left ventricular
 size, wall thickness and motion. Preserved  left ventricular systolic function. LVEF is 70-
75%. Grade 1 left ventricular diastole dysfunction. Mild tricuspid valve regurgitation. Mild
 thickened trileaflet aortic valve with adequate opening. A mild aortic valve insufficiency.
 Normal right-sided pressure.
  C.  Will recheck echocardiogram prior to her next visit.
 
 3. Hypertension, essential:
  A.  Today her blood pressure is well-controlled, on low end of normal range, but she has n
o symptoms of orthostasis.
 
 4.  Hyperlipidemia, mixed.
  A.  On rosuvastatin 20 mg.
 
 5.  Obstructive sleep apnea:
  A.  She uses a CPAP machine at night with 2 L of oxygen bled in.
 
 6.  Morbid obesity.
  A.  Body mass index is 43.12 kg/(m^2). She has been working on calorie restricted diet, an
d has lost a little weight this past month.
 
 7. Type II diabetes. Not otherwise addressed today. 
 
 8. Chronic kidney disease:
  A.  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 Followed by Dr. Moser.
 
 9.  DVT left leg 2015:
  A.  She took a one year course of apixaban. Now on aspirin alone.
 
 
 10.  Hypothyroidism. Not otherwise addressed today. 
 
 PLAN:  
 
 1. She will continue with her current medical regimen.  
 2. She will follow up in 1 year for office visit, or sooner with concerns.  She will have a
n ECG at her follow up visit, She will have echocardiogram done prior to visit to follow up 
her pulmonary hypertension. and She will have fasting labs prior to visit for lipid profile,
 CMP and CBC, if not done prior by another provider.
 
 Electronically signed by: 
 BELKIS Arredondo on 2017 
 
 Portions of this chart may have been created with Dragon voice recognition software. Occasi
onal wrong-word or   sound-alike  substitutions may have occurred due to the inherent naqvi
itations of voice recognition software. Please read the chart carefully and recognize, using
 context, where these substitutions have occurred.Electronically signed by NATHANIEL Arredondo NP at 2017  3:08 PM PSTdocumented in this encounter
 
 Plan of Treatment
 
 
+--------+-----------+------------+----------------------+-------------------+
| Date   | Type      | Specialty  | Care Team            | Description       |
+--------+-----------+------------+----------------------+-------------------+
| / | Office    | Cardiology |   Tonia Wilson,    |                   |
|    | Visit     |            | ARNJESSICA  [address]   |                   |
|        |           |            | St  [place], [place]  |                   |
|        |           |            | [address]  [phone]  |                   |
|        |           |            |  [phone] (Fax)  |                   |
+--------+-----------+------------+----------------------+-------------------+
| / | Hospital  | Radiology  |   Abebe Townsend, |                   |
|    | Encounter |            |  MD  [address] |                   |
|        |           |            |  [place],   |                   |
|        |           |            | [place] [address]             |                   |
|        |           |            | [phone]         |                   |
|        |           |            | [phone] (Fax)   |                   |
+--------+-----------+------------+----------------------+-------------------+
| / | Surgery   | Radiology  |   Abebe Townsend, | CV EP PPM SYSTEM  |
|    |           |            |  MD  [address] | IMPLANT           |
|        |           |            |  [place],   |                   |
|        |           |            | [place] [address]             |                   |
|        |           |            | [phone]         |                   |
|        |           |            | [phone] (Fax)   |                   |
+--------+-----------+------------+----------------------+-------------------+
| 02/10/ | Clinical  | Cardiology |                      |                   |
|    | Support   |            |                      |                   |
+--------+-----------+------------+----------------------+-------------------+
| / | Office    | Cardiology |   Tonia Wilson,    |                   |
|    | Visit     |            | [facility]  [address]   |                   |
|        |           |            |   [place]  |                   |
|        |           |            | [address]  [phone]  |                   |
|        |           |            |  [phone] (Fax)  |                   |
+--------+-----------+------------+----------------------+-------------------+
| / | Off-Site  | Nephrology |   Marzena Moser  |                   |
|    | Visit     |            | DO ETIENNE  [address] [place] [address]      |                   |
|        |           |            | [address]      |                   |
|        |           |            | [place]      |                   |
|        |           |            | [address]  [phone]  |                   |
|        |           |            |  [phone] (Fax)  |                   |
 
+--------+-----------+------------+----------------------+-------------------+
 documented as of this encounter
 
 Procedures
 
 
+----------------+--------+-------------+----------------------+----------------------+
| Procedure Name | Priori | Date/Time   | Associated Diagnosis | Comments             |
|                | ty     |             |                      |                      |
+----------------+--------+-------------+----------------------+----------------------+
| ECG 12 LEAD    | Routin | 2017  |   Coronary artery    |   Results for this   |
|                | e      |  2:19 PM    | disease involving    | procedure are in the |
|                |        | PST         | native coronary      |  results section.    |
|                |        |             | artery of native     |                      |
|                |        |             | heart without angina |                      |
|                |        |             |  pectoris            |                      |
+----------------+--------+-------------+----------------------+----------------------+
 documented in this encounter
 
 Results
 ECHO Complete (2018 12:58 PM PDT)
 
+-------------+--------+-----------+-------------+--------------+
| Component   | Value  | Ref Range | Performed   | Pathologist  |
|             |        |           | At          | Signature    |
+-------------+--------+-----------+-------------+--------------+
| LVEF-TTE    | 60     | %         | [facility] |              |
| TRANSTHORAC |        |           |             |              |
| IC ECHO     |        |           |             |              |
+-------------+--------+-----------+-------------+--------------+
| Patient     | 270lb  |           | [facility] |              |
| Weight      |        |           |             |              |
| (lbs)       |        |           |             |              |
+-------------+--------+-----------+-------------+--------------+
| Patient     | 5f6in  |           | [facility] |              |
| Height      |        |           |             |              |
+-------------+--------+-----------+-------------+--------------+
| LVIDd       | 5.01   | cm        | [facility] |              |
+-------------+--------+-----------+-------------+--------------+
| FS          | 34     | %         | [facility] |              |
+-------------+--------+-----------+-------------+--------------+
| LA volume   | 106.4  | mL        | [facility] |              |
+-------------+--------+-----------+-------------+--------------+
| MV Area by  | 3.27   | cm2       | [facility] |              |
| P 1/2       |        |           |             |              |
| method      |        |           |             |              |
+-------------+--------+-----------+-------------+--------------+
| IVRT        | 131.49 | msec      | [facility] |              |
+-------------+--------+-----------+-------------+--------------+
| LVOT        | 2.38   | cm        | [facility] |              |
| diameter    |        |           |             |              |
+-------------+--------+-----------+-------------+--------------+
| LVOT peak   | 68.43  | cm/s      | [facility] |              |
| travon         |        |           |             |              |
+-------------+--------+-----------+-------------+--------------+
| AV peak trvaon | 149.09 | cm/s      | [facility] |              |
+-------------+--------+-----------+-------------+--------------+
| AV peak     | 8.89   | mmHg      | [facility] |              |
| gradient    |        |           |             |              |
+-------------+--------+-----------+-------------+--------------+
 
| MV peak     | 2.62   | mmHg      | [facility] |              |
| gradient    |        |           |             |              |
+-------------+--------+-----------+-------------+--------------+
| MV Pressure | 67.22  | msec      | [facility] |              |
|  1/2 time   |        |           |             |              |
+-------------+--------+-----------+-------------+--------------+
| LA Volume   | 47     | mL/m2     | [facility] |              |
| Index       |        |           |             |              |
+-------------+--------+-----------+-------------+--------------+
| AV LVOT     | 1.87   | mmHg      | [facility] |              |
| Peak        |        |           |             |              |
| Gradient    |        |           |             |              |
+-------------+--------+-----------+-------------+--------------+
| LV          | 7.01   | cm        | [facility] |              |
| Diastolic   |        |           |             |              |
| Length 4C   |        |           |             |              |
+-------------+--------+-----------+-------------+--------------+
| LV          | 60     | %         | [facility] |              |
| Moran's   |        |           |             |              |
| Biplane EF  |        |           |             |              |
+-------------+--------+-----------+-------------+--------------+
| LV ED       | 86.24  | ml        | [facility] |              |
| Volume      |        |           |             |              |
| (Moran's) |        |           |             |              |
+-------------+--------+-----------+-------------+--------------+
| LV ED       | 38     | ml/m2     | [facility] |              |
| Volume      |        |           |             |              |
| Index       |        |           |             |              |
+-------------+--------+-----------+-------------+--------------+
| LV ES       | 37.52  | ml        | [facility] |              |
| Volume      |        |           |             |              |
+-------------+--------+-----------+-------------+--------------+
| MV E'       | 5      | cm/s      | [facility] |              |
| Septal      |        |           |             |              |
| Velocity    |        |           |             |              |
+-------------+--------+-----------+-------------+--------------+
| MV          | 349.23 | cm/s2     | [facility] |              |
| Deceleratio |        |           |             |              |
| n [place]     |        |           |             |              |
+-------------+--------+-----------+-------------+--------------+
| MV          | 231.78 | msec      | [facility] |              |
| Deceleratio |        |           |             |              |
| n Time      |        |           |             |              |
+-------------+--------+-----------+-------------+--------------+
| MV E/A      | 1.24   |           | [facility] |              |
| Ratio       |        |           |             |              |
+-------------+--------+-----------+-------------+--------------+
| MV Peak     | 65.42  | cm/s      | [facility] |              |
| A-Wave      |        |           |             |              |
+-------------+--------+-----------+-------------+--------------+
| MV Peak     | 80.95  | cm/s      | [facility] |              |
| E-Wave      |        |           |             |              |
+-------------+--------+-----------+-------------+--------------+
| LA/Aorta    | 1.04   |           | [facility] |              |
| Ratio       |        |           |             |              |
+-------------+--------+-----------+-------------+--------------+
| MV E/E      | 16.19  |           | [facility] |              |
| SEPTAL      |        |           |             |              |
+-------------+--------+-----------+-------------+--------------+
| LV ES       | 17     | ml/m2     | [facility] |              |
 
| Volume      |        |           |             |              |
| Index       |        |           |             |              |
+-------------+--------+-----------+-------------+--------------+
| Heart Rate  | 62     |           | [facility] |              |
+-------------+--------+-----------+-------------+--------------+
| Aortic Root | 3.9    | cm        | [facility] |              |
|  Diameter   |        |           |             |              |
+-------------+--------+-----------+-------------+--------------+
| IVS         | 1.26   | cm        | [facility] |              |
| Diastolic   |        |           |             |              |
| Thickness   |        |           |             |              |
| MM          |        |           |             |              |
+-------------+--------+-----------+-------------+--------------+
| LVPW        | 1.34   | cm        | [facility] |              |
| Diastolic   |        |           |             |              |
| Thickness   |        |           |             |              |
| MM          |        |           |             |              |
+-------------+--------+-----------+-------------+--------------+
| LV Systolic | 3.31   | cm        | [facility] |              |
|  Diameter   |        |           |             |              |
| MM          |        |           |             |              |
+-------------+--------+-----------+-------------+--------------+
| AV Cusp     | 1.13   | cm        | [facility] |              |
| Seperation  |        |           |             |              |
| MM          |        |           |             |              |
+-------------+--------+-----------+-------------+--------------+
| LA Systolic | 4.06   | cm        | [facility] |              |
|  Diameter   |        |           |             |              |
| MM          |        |           |             |              |
+-------------+--------+-----------+-------------+--------------+
| TAPSE       | 1.8    | cm        | [facility] |              |
+-------------+--------+-----------+-------------+--------------+
| RA PRESSURE | 3      | mmHg      | [facility] |              |
+-------------+--------+-----------+-------------+--------------+
 
 
 
+----------+
| Specimen |
+----------+
|          |
+----------+
 
 
 
+---------------------------------------------------------------------------+---------------
+
| Narrative                                                                 | Performed At  
|
+---------------------------------------------------------------------------+---------------
+
|   This result has an attachment that is not available.  1.   Moderate     |   [facility] 
|
| left atrial dilatation.2.   Normal left ventricular size with a mild      |               
|
| concentric left ventricular hypertrophy.   Left ventricular systolic      |               
|
| dysfunction is preserved.   LVEF is 60-65%.3.   Mild aortic root          |               
|
| dilatation measuring 3.9 cm in diameter.4.   Mildly thickened and         |               
 
|
| calcified trileaflet aortic valve with adequate opening.   There is       |               
|
| aortic valve sclerosis without significant aortic valve stenosis.5.       |               
|
|   Mildly thickened and calcified mitral valve with a mild mitral valve    |               
|
|  regurgitation.6.   Mild mitral annular calcification.   7.   Normal      |               
|
| right-sided pressure.8.   Normal IVC with normal respiratory              |               
|
| collapse.9.   When compared to echocardiography on 4/15/14, no            |               
|
| significant changes.                                                      |               
|
|8.   Normal IVC with normal respiratory collapse.                          |               
|
|9.   When compared to echocardiography on 4/15/14, no significant changes. |               
|
+---------------------------------------------------------------------------+---------------
+
 
 
 
+---------------+---------+--------------------+--------------+
| Performing    | Address | City/State/Zipcode | Phone Number |
| Organization  |         |                    |              |
+---------------+---------+--------------------+--------------+
|   [facility] |         |                    |              |
+---------------+---------+--------------------+--------------+
 ECG 12 lead (2017  2:19 PM PST)
 
+-------------+--------------------------+-----------+------------+--------------+
| Component   | Value                    | Ref Range | Performed  | Pathologist  |
|             |                          |           | At         | Signature    |
+-------------+--------------------------+-----------+------------+--------------+
| VENTRICULAR | 76                       | BPM       | [facility]  |              |
|  RATE EKG   |                          |           |            |              |
+-------------+--------------------------+-----------+------------+--------------+
| ATRIAL RATE | 76                       | BPM       | [facility]  |              |
+-------------+--------------------------+-----------+------------+--------------+
| P-R         | 218                      | ms        | [facility]  |              |
| INTERVAL    |                          |           |            |              |
+-------------+--------------------------+-----------+------------+--------------+
| QRS         | 86                       | ms        | [facility]  |              |
| DURATION    |                          |           |            |              |
+-------------+--------------------------+-----------+------------+--------------+
| Q-T         | 394                      | ms        | [facility]  |              |
| INTERVAL    |                          |           |            |              |
+-------------+--------------------------+-----------+------------+--------------+
| Q-T         | 443                      | ms        | [facility]  |              |
| INTERVAL    |                          |           |            |              |
| (CORRECTED) |                          |           |            |              |
+-------------+--------------------------+-----------+------------+--------------+
| P WAVE AXIS | 41                       | degrees   | [facility]  |              |
+-------------+--------------------------+-----------+------------+--------------+
| QRS AXIS    | -50                      | degrees   | [facility]  |              |
+-------------+--------------------------+-----------+------------+--------------+
| T AXIS      | 57                       | degrees   | [facility]  |              |
+-------------+--------------------------+-----------+------------+--------------+
 
| INTERPRETAT | Sinus rhythm with 1st    |           | [facility]  |              |
| ION TEXT    | degree AV blockLeft axis |           |            |              |
|             |  deviationAbnormal       |           |            |              |
|             | ECGWhen compared with    |           |            |              |
|             | ECG of 08-DEC-2015       |           |            |              |
|             | 15:26,No significant     |           |            |              |
|             | change was               |           |            |              |
|             | foundConfirmed by        |           |            |              |
|             | ABEBE TOWNSEND MD     |           |            |              |
|             | ([id number]) on 2017     |           |            |              |
|             | 4:11:18 PM               |           |            |              |
+-------------+--------------------------+-----------+------------+--------------+
 
 
 
+----------+
| Specimen |
+----------+
|          |
+----------+
 
 
 
+----------------------------------------------------------+--------------+
| Narrative                                                | Performed At |
+----------------------------------------------------------+--------------+
|   This result has an attachment that is not available.   |              |
+----------------------------------------------------------+--------------+
 
 
 
+--------------+---------+--------------------+--------------+
| Performing   | Address | City/State/Zipcode | Phone Number |
| Organization |         |                    |              |
+--------------+---------+--------------------+--------------+
|   [facility]  |         |                    |              |
+--------------+---------+--------------------+--------------+
 documented in this encounter
 
 Visit Diagnoses
 
 
+-------------------------------------------------------------------------------------+
| Diagnosis                                                                           |
+-------------------------------------------------------------------------------------+
|   Coronary artery disease involving native coronary artery of native heart without  |
| angina pectoris - Primary                                                           |
+-------------------------------------------------------------------------------------+
|   Hypertension, essential  Unspecified essential hypertension                       |
+-------------------------------------------------------------------------------------+
|   Mixed hyperlipidemia                                                              |
+-------------------------------------------------------------------------------------+
|   Aortic valve insufficiency, unspecified etiology                                  |
+-------------------------------------------------------------------------------------+
|   Pulmonary hypertension (HCC)  Other chronic pulmonary heart diseases              |
+-------------------------------------------------------------------------------------+
 documented in this encounter

## 2020-01-08 NOTE — XMS
Encounter Summary
  Created on: 2020
 
 Viviana Ricci
 External Reference #: [id number]
 : 47
 Sex: Female
 
 Demographics
 
 
+-----------------------+----------------------+
| Address               | [address]      |
|                       | [place]  [address] |
+-----------------------+----------------------+
| Home Phone            | [phone]      |
+-----------------------+----------------------+
| Preferred Language    | Unknown              |
+-----------------------+----------------------+
| Marital Status        | Single               |
+-----------------------+----------------------+
| Restorationist Affiliation | [id number]                 |
+-----------------------+----------------------+
| Race                  | Unknown              |
+-----------------------+----------------------+
| Ethnic Group          | Unknown              |
+-----------------------+----------------------+
 
 
 Author
 
 
+--------------+--------------------------------------------+
| Author       | [facility]  |
|              | and [place]                                |
+--------------+--------------------------------------------+
| Organization | [facility]  |
|              | and [place]                                |
+--------------+--------------------------------------------+
| Address      | Unknown                                    |
+--------------+--------------------------------------------+
| Phone        | Unavailable                                |
+--------------+--------------------------------------------+
 
 
 
 Support
 
 
+----------------+--------------+---------------------+-----------------+
| Name           | Relationship | Address             | Phone           |
+----------------+--------------+---------------------+-----------------+
| Ada/Ed Radhames | ECON         | [place]              | [phone] |
|                |              | [place], [place]  |                 |
|                |              |  [address]              |                 |
+----------------+--------------+---------------------+-----------------+
 
 
 
 
 Care Team Providers
 
 
+-----------------------+------+-------------+
| Care Team Member Name | Role | Phone       |
+-----------------------+------+-------------+
 PCP  | Unavailable |
+-----------------------+------+-------------+
 
 
 
 Encounter Details
 
 
+--------+-----------+----------------------+-----------+-------------+
| Date   | Type      | Department           | Care Team | Description |
+--------+-----------+----------------------+-----------+-------------+
| / | Hospital  |   [facility] |           |             |
|    | Encounter |  MED CTR XRAY  [address]           |             |
|        |           [address]       |           |             |
|        |           | [place] [address] |           |             |
|        |           |   [phone]       |           |             |
+--------+-----------+----------------------+-----------+-------------+
 
 
 
 Social History
 
 
+----------------+-------+-----------+--------+------+
| Tobacco Use    | Types | Packs/Day | Years  | Date |
|                |       |           | Used   |      |
+----------------+-------+-----------+--------+------+
| Never Assessed |       |           |        |      |
+----------------+-------+-----------+--------+------+
 
 
 
+------------------+---------------+
| Sex Assigned at  | Date Recorded |
| Birth            |               |
+------------------+---------------+
| Not on file      |               |
+------------------+---------------+
 
 
 
+----------------+-------------+-------------+
| Job Start Date | Occupation  | Industry    |
+----------------+-------------+-------------+
| Not on file    | Not on file | Not on file |
+----------------+-------------+-------------+
 
 
 
+----------------+--------------+------------+
| Travel History | Travel Start | Travel End |
+----------------+--------------+------------+
 
 
 
 
+-------------------------------------+
| No recent travel history available. |
+-------------------------------------+
 documented as of this encounter
 
 Plan of Treatment
 
 
+--------+-----------+------------+----------------------+-------------------+
| Date   | Type      | Specialty  | Care Team            | Description       |
+--------+-----------+------------+----------------------+-------------------+
| / | Office    | Cardiology |   Tonia Wilson,    |                   |
|    | Visit     |            | ARNP  [address]   |                   |
|        |           |            | St  [place] [facility], [place]  |                   |
|        |           |            | [address]  [phone]  |                   |
|        |           |            |  [phone] (Fax)  |                   |
+--------+-----------+------------+----------------------+-------------------+
| / | Hospital  | Radiology  |   Popeye Townsend, |                   |
|    | Encounter |            |  MD  [address] |                   |
|        |           |            |  St.  [place],   |                   |
|        |           |            | [place] [address]             |                   |
|        |           |            | [phone]         |                   |
|        |           |            | [phone] (Fax)   |                   |
+--------+-----------+------------+----------------------+-------------------+
| / | Surgery   | Radiology  |   Popeye Townsend, | CV EP PPM SYSTEM  |
| 2020   |           |            |  MD  [address] | IMPLANT           |
|        |           |            |  St.  [place],   |                   |
|        |           |            | [place] [address]             |                   |
|        |           |            | [phone]         |                   |
|        |           |            | [phone] (Fax)   |                   |
+--------+-----------+------------+----------------------+-------------------+
| 02/10/ | Clinical  | Cardiology |                      |                   |
|    | Support   |            |                      |                   |
+--------+-----------+------------+----------------------+-------------------+
| / | Office    | Cardiology |   Hellberg, Tonia,    |                   |
|    | Visit     |            | [facility]  [address]   |                   |
|        |           |            | [place]  |                   |
|        |           |            | [address]  [phone]  |                   |
|        |           |            |  [phone] (Fax)  |                   |
+--------+-----------+------------+----------------------+-------------------+
| / | Off-Site  | Nephrology |   Marzena Moser  |                   |
|    | Visit     |            | M, DO  [address]      |                   |
|        |           |            | [address]      |                   |
|        |           |            | [place]      |                   |
|        |           |            | [address]  [phone]  |                   |
|        |           |            |  [phone] (Fax)  |                   |
+--------+-----------+------------+----------------------+-------------------+
 documented as of this encounter
 
 Visit Diagnoses
 Not on filedocumented in this encounter

## 2020-01-08 NOTE — XMS
Encounter Summary
  Created on: 2020
 
 Viviana Ricci
 External Reference #: [id number]
 : 47
 Sex: Female
 
 Demographics
 
 
+-----------------------+----------------------+
| Address               | [address]      |
|                       | [place]  [address] |
+-----------------------+----------------------+
| Home Phone            | [phone]      |
+-----------------------+----------------------+
| Preferred Language    | Unknown              |
+-----------------------+----------------------+
| Marital Status        | Single               |
+-----------------------+----------------------+
| Christianity Affiliation | [id number]                 |
+-----------------------+----------------------+
| Race                  | Unknown              |
+-----------------------+----------------------+
| Ethnic Group          | Unknown              |
+-----------------------+----------------------+
 
 
 Author
 
 
+--------------+--------------------------------------------+
| Author       | [facility] [place]  |
|              | and [place]                                |
+--------------+--------------------------------------------+
| Organization | [facility] [place]  |
|              | and [place]                                |
+--------------+--------------------------------------------+
| Address      | Unknown                                    |
+--------------+--------------------------------------------+
| Phone        | Unavailable                                |
+--------------+--------------------------------------------+
 
 
 
 Support
 
 
+----------------+--------------+---------------------+-----------------+
| Name           | Relationship | Address             | Phone           |
+----------------+--------------+---------------------+-----------------+
| Ada/Ed Radhames | ECON         | [address]              | [phone] |
|                |              | [place]  |                 |
|                |              |  [address]              |                 |
+----------------+--------------+---------------------+-----------------+
 
 
 
 
 Care Team Providers
 
 
+-----------------------+------+-------------+
| Care Team Member Name | Role | Phone       |
+-----------------------+------+-------------+
 PCP  | Unavailable |
+-----------------------+------+-------------+
 
 
 
 Encounter Details
 
 
+--------+-----------+----------------------+-----------+-------------+
| Date   | Type      | Department           | Care Team | Description |
+--------+-----------+----------------------+-----------+-------------+
| / | Hospital  |   [facility] |           |             |
|    | Encounter |  MED CTR MP INTRA OP |           |             |
|        |           |   [address]       |           |             |
|        |           | [place]      |           |             |
|        |           | [address]           |           |             |
|        |           | [phone]         |           |             |
+--------+-----------+----------------------+-----------+-------------+
 
 
 
 Social History
 
 
+----------------+-------+-----------+--------+------+
| Tobacco Use    | Types | Packs/Day | Years  | Date |
|                |       |           | Used   |      |
+----------------+-------+-----------+--------+------+
| Never Assessed |       |           |        |      |
+----------------+-------+-----------+--------+------+
 
 
 
+------------------+---------------+
| Sex Assigned at  | Date Recorded |
| Birth            |               |
+------------------+---------------+
| Not on file      |               |
+------------------+---------------+
 
 
 
+----------------+-------------+-------------+
| Job Start Date | Occupation  | Industry    |
+----------------+-------------+-------------+
| Not on file    | Not on file | Not on file |
+----------------+-------------+-------------+
 
 
 
+----------------+--------------+------------+
| Travel History | Travel Start | Travel End |
+----------------+--------------+------------+
 
 
 
 
+-------------------------------------+
| No recent travel history available. |
+-------------------------------------+
 documented as of this encounter
 
 Plan of Treatment
 
 
+--------+-----------+------------+----------------------+-------------------+
| Date   | Type      | Specialty  | Care Team            | Description       |
+--------+-----------+------------+----------------------+-------------------+
| / | Office    | Cardiology |   Tonia Wilson,    |                   |
|    | Visit     |            | [facility]  [address]   |                   |
|        |           |            | St  [place] [facility], [place]  |                   |
|        |           |            | [address]  [phone]  |                   |
|        |           |            |  [phone] (Fax)  |                   |
+--------+-----------+------------+----------------------+-------------------+
| / | Hospital  | Radiology  |   Popeye Townsend, |                   |
|    | Encounter |            |  MD  [address] |                   |
|        |           |            |  St.  [place],   |                   |
|        |           |            | [place] [address]             |                   |
|        |           |            | [phone]         |                   |
|        |           |            | [phone] (Fax)   |                   |
+--------+-----------+------------+----------------------+-------------------+
| / | Surgery   | Radiology  |   Popeye Townsend, | CV EP PPM SYSTEM  |
|    |           |            |  MD  [address] | IMPLANT           |
|        |           |            |  St.  [place],   |                   |
|        |           |            | [place] [address]             |                   |
|        |           |            | [phone]         |                   |
|        |           |            | [phone] (Fax)   |                   |
+--------+-----------+------------+----------------------+-------------------+
| 02/10/ | Clinical  | Cardiology |                      |                   |
|    | Support   |            |                      |                   |
+--------+-----------+------------+----------------------+-------------------+
| / | Office    | Cardiology |   Tonia Wilson,    |                   |
|    | Visit     |            | [facility]  [address]   |                   |
|        |           |            | [place]  |                   |
|        |           |            | [address]  [phone]  |                   |
|        |           |            |  [phone] (Fax)  |                   |
+--------+-----------+------------+----------------------+-------------------+
| / | Off-Site  | Nephrology |   Marzena Moser  |                   |
|    | Visit     |            | DO ETIENNE  [address] [place] [address]      |                   |
|        |           |            | [address]      |                   |
|        |           |            | [place]      |                   |
|        |           |            | [address] [phone]  |                   |
|        |           |            |  [phone] (Fax)  |                   |
+--------+-----------+------------+----------------------+-------------------+
 documented as of this encounter
 
 Visit Diagnoses
 Not on filedocumented in this encounter

## 2020-01-08 NOTE — XMS
Encounter Summary
  Created on: 2020
 
 Viviana Ricci
 External Reference #: [id number]
 : 47
 Sex: Female
 
 Demographics
 
 
+-----------------------+----------------------+
| Address               | [address]      |
|                       | [place]  [address] |
+-----------------------+----------------------+
| Home Phone            | [phone]      |
+-----------------------+----------------------+
| Preferred Language    | Unknown              |
+-----------------------+----------------------+
| Marital Status        | Single               |
+-----------------------+----------------------+
| Mandaeism Affiliation | [id number]                 |
+-----------------------+----------------------+
| Race                  | Unknown              |
+-----------------------+----------------------+
| Ethnic Group          | Unknown              |
+-----------------------+----------------------+
 
 
 Author
 
 
+--------------+--------------------------------------------+
| Author       | [facility]  |
|              | and [facility]                                |
+--------------+--------------------------------------------+
| Organization | [facility]  |
|              | and [facility]                                |
+--------------+--------------------------------------------+
| Address      | Unknown                                    |
+--------------+--------------------------------------------+
| Phone        | Unavailable                                |
+--------------+--------------------------------------------+
 
 
 
 Support
 
 
+----------------+--------------+---------------------+-----------------+
| Name           | Relationship | Address             | Phone           |
+----------------+--------------+---------------------+-----------------+
| Ada/Ed Radhames | ECON         | [address]              | [phone] |
|                |              | [place]  |                 |
|                |              |  [address]              |                 |
+----------------+--------------+---------------------+-----------------+
 
 
 
 
 Care Team Providers
 
 
+------------------------+------+-----------------+
| Care Team Member Name  | Role | Phone           |
+------------------------+------+-----------------+
| Marzena Moser DO | PCP  | [phone] |
+------------------------+------+-----------------+
 
 
 
 Encounter Details
 
 
+--------+----------+---------------------+----------------------+-------------+
| Date   | Type     | Department          | Care Team            | Description |
+--------+----------+---------------------+----------------------+-------------+
| / | Abstract |   [facility]         |   Marzena Moser  |             |
| 2019   |          | NEPHROLOGY  [address]   | [address]      |             |
|        |          | [address]   | [address]      |             |
|        |          | [place], [place]     | [place], [place]      |             |
|        |          | [address]          | [address]  [phone]  |             |
|        |          | [phone]        |  [phone] (Fax)  |             |
+--------+----------+---------------------+----------------------+-------------+
 
 
 
 Social History
 
 
+--------------+-------+-----------+--------+------+
| Tobacco Use  | Types | Packs/Day | Years  | Date |
|              |       |           | Used   |      |
+--------------+-------+-----------+--------+------+
| Never Smoker |       |           |        |      |
+--------------+-------+-----------+--------+------+
 
 
 
+---------------------+---+---+---+
| Smokeless Tobacco:  |   |   |   |
| Never Used          |   |   |   |
+---------------------+---+---+---+
 
 
 
+---------------------------------------------------------------+
| Comments: some second hand smoke exposure, but fairly minimal |
+---------------------------------------------------------------+
 
 
 
+-------------+-------------+---------+----------+
| Alcohol Use | Drinks/Week | oz/Week | Comments |
+-------------+-------------+---------+----------+
| No          |             |         |          |
+-------------+-------------+---------+----------+
 
 
 
 
+------------------+---------------+
| Sex Assigned at  | Date Recorded |
| Birth            |               |
+------------------+---------------+
| Not on file      |               |
+------------------+---------------+
 
 
 
+----------------+-------------+-------------+
| Job Start Date | Occupation  | Industry    |
+----------------+-------------+-------------+
| Not on file    | Not on file | Not on file |
+----------------+-------------+-------------+
 
 
 
+----------------+--------------+------------+
| Travel History | Travel Start | Travel End |
+----------------+--------------+------------+
 
 
 
+-------------------------------------+
| No recent travel history available. |
+-------------------------------------+
 documented as of this encounter
 
 Plan of Treatment
 
 
+--------+-----------+------------+----------------------+-------------------+
| Date   | Type      | Specialty  | Care Team            | Description       |
+--------+-----------+------------+----------------------+-------------------+
| / | Office    | Cardiology |   Tonia Wilson,    |                   |
|    | Visit     |            | ARNP  [address]   |                   |
|        |           |            | St  [place] [facility] [place]  |                   |
|        |           |            | [address]  [phone]  |                   |
|        |           |            |  [phone] (Fax)  |                   |
+--------+-----------+------------+----------------------+-------------------+
| / | Hospital  | Radiology  |   Popeye Townsend, |                   |
|    | Encounter |            |  MD  [address] |                   |
|        |           |            |  St.  [place],   |                   |
|        |           |            | [place] [address]             |                   |
|        |           |            | [phone]         |                   |
|        |           |            | [phone] (Fax)   |                   |
+--------+-----------+------------+----------------------+-------------------+
| / | Surgery   | Radiology  |   Popeye Townsend, | CV EP PPM SYSTEM  |
|    |           |            |  MD  [address] | IMPLANT           |
|        |           |            |  St.  [place],   |                   |
|        |           |            | [place] [address]             |                   |
|        |           |            | [phone]         |                   |
|        |           |            | [phone] (Fax)   |                   |
+--------+-----------+------------+----------------------+-------------------+
| 02/10/ | Clinical  | Cardiology |                      |                   |
2020   | Support   |            |                      |                   |
+--------+-----------+------------+----------------------+-------------------+
| / | Office    | Cardiology |   Tonia Wilson,    |                   |
|    | Visit     |            | [facility]  [address]   |                   |
 
|        |           |            |   [place]  |                   |
|        |           |            | [address]  [phone]  |                   |
|        |           |            |  [phone] (Fax)  |                   |
+--------+-----------+------------+----------------------+-------------------+
| / | Off-Site  | Nephrology |   Marzena Moser  |                   |
2020   | Visit     |            | DO [facility]TI[facility]  [address]      |                   |
|        |           |            | [address]      |                   |
|        |           |            | [place]      |                   |
|        |           |            | [address]  [phone]  |                   |
|        |           |            |  [phone] (Fax)  |                   |
+--------+-----------+------------+----------------------+-------------------+
 documented as of this encounter
 
 Procedures
 
 
+----------------------+--------+------------+----------------------+----------------------+
| Procedure Name       | Priori | Date/Time  | Associated Diagnosis | Comments             |
|                      | ty     |            |                      |                      |
+----------------------+--------+------------+----------------------+----------------------+
| EXTERNAL LAB: BUN    | Routin | 2019 |                      |   Results for this   |
|                      | e      |            |                      | procedure are in the |
|                      |        |            |                      |  results section.    |
+----------------------+--------+------------+----------------------+----------------------+
| EXTERNAL LAB:        | Routin | 2019 |                      |   Results for this   |
| GLUCOSE              | e      |            |                      | procedure are in the |
|                      |        |            |                      |  results section.    |
+----------------------+--------+------------+----------------------+----------------------+
| EXTERNAL LAB:        | Routin | 2019 |                      |   Results for this   |
| TACROLIMUS LEVEL,    | e      |            |                      | procedure are in the |
| LC-MS/MS             |        |            |                      |  results section.    |
+----------------------+--------+------------+----------------------+----------------------+
| EXTERNAL LAB:        | Routin | 2019 |                      |   Results for this   |
| CYTOMEGALOVIRUS AB,  | e      |            |                      | procedure are in the |
| IGM                  |        |            |                      |  results section.    |
+----------------------+--------+------------+----------------------+----------------------+
| EXTERNAL LAB: ALT    | Routin | 2019 |                      |   Results for this   |
|                      | e      |            |                      | procedure are in the |
|                      |        |            |                      |  results section.    |
+----------------------+--------+------------+----------------------+----------------------+
| EXTERNAL LAB: AST    | Routin | 2019 |                      |   Results for this   |
|                      | e      |            |                      | procedure are in the |
|                      |        |            |                      |  results section.    |
+----------------------+--------+------------+----------------------+----------------------+
| EXTERNAL LAB:        | Routin | 2019 |                      |   Results for this   |
| ALKALINE PHOSPHATASE | e      |            |                      | procedure are in the |
|                      |        |            |                      |  results section.    |
+----------------------+--------+------------+----------------------+----------------------+
| EXTERNAL LAB:        | Routin | 2019 |                      |   Results for this   |
| BILIRUBIN, TOTAL     | e      |            |                      | procedure are in the |
|                      |        |            |                      |  results section.    |
+----------------------+--------+------------+----------------------+----------------------+
| EXTERNAL LAB:        | Routin | 2019 |                      |   Results for this   |
| ALBUMIN              | e      |            |                      | procedure are in the |
|                      |        |            |                      |  results section.    |
+----------------------+--------+------------+----------------------+----------------------+
| EXTERNAL LAB:        | Routin | 2019 |                      |   Results for this   |
| PROTEIN, TOTAL       | e      |            |                      | procedure are in the |
|                      |        |            |                      |  results section.    |
+----------------------+--------+------------+----------------------+----------------------+
 
| EXTERNAL LAB:        | Routin | 2019 |                      |   Results for this   |
| PHOSPHORUS           | e      |            |                      | procedure are in the |
|                      |        |            |                      |  results section.    |
+----------------------+--------+------------+----------------------+----------------------+
| EXTERNAL LAB:        | Routin | 2019 |                      |   Results for this   |
| MAGNESIUM            | e      |            |                      | procedure are in the |
|                      |        |            |                      |  results section.    |
+----------------------+--------+------------+----------------------+----------------------+
| EXTERNAL LAB:        | Routin | 2019 |                      |   Results for this   |
| CALCIUM              | e      |            |                      | procedure are in the |
|                      |        |            |                      |  results section.    |
+----------------------+--------+------------+----------------------+----------------------+
| EXTERNAL LAB: CARBON | Routin | 2019 |                      |   Results for this   |
|  DIOXIDE             | e      |            |                      | procedure are in the |
|                      |        |            |                      |  results section.    |
+----------------------+--------+------------+----------------------+----------------------+
| EXTERNAL LAB:        | Routin | 2019 |                      |   Results for this   |
| CHLORIDE             | e      |            |                      | procedure are in the |
|                      |        |            |                      |  results section.    |
+----------------------+--------+------------+----------------------+----------------------+
| EXTERNAL LAB:        | Routin | 2019 |                      |   Results for this   |
| POTASSIUM            | e      |            |                      | procedure are in the |
|                      |        |            |                      |  results section.    |
+----------------------+--------+------------+----------------------+----------------------+
| EXTERNAL LAB: SODIUM | Routin | 2019 |                      |   Results for this   |
|                      | e      |            |                      | procedure are in the |
|                      |        |            |                      |  results section.    |
+----------------------+--------+------------+----------------------+----------------------+
| EXTERNAL LAB:        | Routin | 2019 |                      |   Results for this   |
| PROTEIN/CREATININE   | e      |            |                      | procedure are in the |
| RATIO                |        |            |                      |  results section.    |
+----------------------+--------+------------+----------------------+----------------------+
| EXTERNAL LAB: CBC    | Routin | 2019 |                      |   Results for this   |
|                      | e      |            |                      | procedure are in the |
|                      |        |            |                      |  results section.    |
+----------------------+--------+------------+----------------------+----------------------+
| EXTERNAL LAB:        | Routin | 2019 |                      |   Results for this   |
| TRIGLYCERIDES        | e      |            |                      | procedure are in the |
|                      |        |            |                      |  results section.    |
+----------------------+--------+------------+----------------------+----------------------+
| EXTERNAL LAB:        | Routin | 2019 |                      |   Results for this   |
| CHOLESTEROL, HDL     | e      |            |                      | procedure are in the |
|                      |        |            |                      |  results section.    |
+----------------------+--------+------------+----------------------+----------------------+
| EXTERNAL LAB:        | Routin | 2019 |                      |   Results for this   |
| CHOLESTEROL, TOTAL   | e      |            |                      | procedure are in the |
|                      |        |            |                      |  results section.    |
+----------------------+--------+------------+----------------------+----------------------+
| EXTERNAL LAB:        | Routin | 2019 |                      |   Results for this   |
| CHOLESTEROL, LDL     | e      |            |                      | procedure are in the |
|                      |        |            |                      |  results section.    |
+----------------------+--------+------------+----------------------+----------------------+
| EXTERNAL LAB: EGFR   | Routin | 2019 |                      |   Results for this   |
|                      | e      |            |                      | procedure are in the |
|                      |        |            |                      |  results section.    |
+----------------------+--------+------------+----------------------+----------------------+
| EXTERNAL LAB:        | Routin | 2019 |                      |   Results for this   |
| CREATININE           | e      |            |                      | procedure are in the |
|                      |        |            |                      |  results section.    |
+----------------------+--------+------------+----------------------+----------------------+
 
| HEMOGLOBIN A1C       | Routin | 2019 |                      |   Results for this   |
|                      | e      |            |                      | procedure are in the |
|                      |        |            |                      |  results section.    |
+----------------------+--------+------------+----------------------+----------------------+
 documented in this encounter
 
 Results
 External Lab: Tacrolimus Level, LC-MS/MS (2019)
 
+-------------+-------+-----------+------------+--------------+
| Component   | Value | Ref Range | Performed  | Pathologist  |
|             |       |           | At         | Signature    |
+-------------+-------+-----------+------------+--------------+
| Tacrolimus, | 5.6   |           |            |              |
|  LC-MS/MS,  |       |           |            |              |
| External    |       |           |            |              |
+-------------+-------+-----------+------------+--------------+
 
 
 
+----------+
| Specimen |
+----------+
|          |
+----------+
 External Lab: Triglycerides (2019)
 
+-------------+-------+-----------+------------+--------------+
| Component   | Value | Ref Range | Performed  | Pathologist  |
|             |       |           | At         | Signature    |
+-------------+-------+-----------+------------+--------------+
| Triglycerid | 146   |           |            |              |
| es,         |       |           |            |              |
| External    |       |           |            |              |
+-------------+-------+-----------+------------+--------------+
 
 
 
+----------+
| Specimen |
+----------+
| Blood    |
+----------+
 External Lab: Cholesterol, HDL (2019)
 
+-------------+-------+-----------+------------+--------------+
| Component   | Value | Ref Range | Performed  | Pathologist  |
|             |       |           | At         | Signature    |
+-------------+-------+-----------+------------+--------------+
| HDL         | 54.8  | mg/dl     |            |              |
| Cholesterol |       |           |            |              |
| , External  |       |           |            |              |
+-------------+-------+-----------+------------+--------------+
 
 
 
+----------+
| Specimen |
+----------+
| Blood    |
 
+----------+
 External Lab: Cholesterol, Total (2019)
 
+-------------+-------+-----------+------------+--------------+
| Component   | Value | Ref Range | Performed  | Pathologist  |
|             |       |           | At         | Signature    |
+-------------+-------+-----------+------------+--------------+
| Cholesterol | 134   | mg/dl     |            |              |
| , Total,    |       |           |            |              |
| External    |       |           |            |              |
+-------------+-------+-----------+------------+--------------+
 
 
 
+----------+
| Specimen |
+----------+
| Blood    |
+----------+
 External Lab: Cholesterol, LDL (2019)
 
+-------------+-------+-----------+------------+--------------+
| Component   | Value | Ref Range | Performed  | Pathologist  |
|             |       |           | At         | Signature    |
+-------------+-------+-----------+------------+--------------+
| LDL         | 50    |           |            |              |
| Cholesterol |       |           |            |              |
| , Direct,   |       |           |            |              |
| External    |       |           |            |              |
+-------------+-------+-----------+------------+--------------+
 
 
 
+----------+
| Specimen |
+----------+
| Blood    |
+----------+
 External Lab: Cytomegalovirus Ab, IgM (2019)
 
+-----------+----------+-----------+------------+--------------+
| Component | Value    | Ref Range | Performed  | Pathologist  |
|           |          |           | At         | Signature    |
+-----------+----------+-----------+------------+--------------+
| CMV IgM,  | positive |           |            |              |
| External  |          |           |            |              |
+-----------+----------+-----------+------------+--------------+
 
 
 
+----------+
| Specimen |
+----------+
|          |
+----------+
 External Lab: Protein/Creatinine Ratio (2019)
 
+-------------+-----------+-----------+------------+--------------+
| Component   | Value     | Ref Range | Performed  | Pathologist  |
|             |           |           | At         | Signature    |
 
+-------------+-----------+-----------+------------+--------------+
| Protein/Cre | 0.250 (A) | 0.2       |            |              |
| atinine     |           |           |            |              |
| Ratio,      |           |           |            |              |
| External    |           |           |            |              |
+-------------+-----------+-----------+------------+--------------+
 
 
 
+----------+
| Specimen |
+----------+
|          |
+----------+
 Hemoglobin A1C (2019)
 
+-------------+-------+-----------+------------+--------------+
| Component   | Value | Ref Range | Performed  | Pathologist  |
|             |       |           | At         | Signature    |
+-------------+-------+-----------+------------+--------------+
| Hemoglobin  | 7.0   | %         |            |              |
| A1c         |       |           |            |              |
+-------------+-------+-----------+------------+--------------+
 
 
 
+----------+
| Specimen |
+----------+
| Blood    |
+----------+
 External Lab: BUN (2019)
 
+-----------+-------+-----------+------------+--------------+
| Component | Value | Ref Range | Performed  | Pathologist  |
|           |       |           | At         | Signature    |
+-----------+-------+-----------+------------+--------------+
| BUN,      | 31    |           |            |              |
| External  |       |           |            |              |
+-----------+-------+-----------+------------+--------------+
 External Lab: Glucose (2019)
 
+-----------+-------+-----------+------------+--------------+
| Component | Value | Ref Range | Performed  | Pathologist  |
|           |       |           | At         | Signature    |
+-----------+-------+-----------+------------+--------------+
| Glucose,  | 121   |           |            |              |
| External  |       |           |            |              |
+-----------+-------+-----------+------------+--------------+
 External Lab: ALT (2019)
 
+-----------+-------+-----------+------------+--------------+
| Component | Value | Ref Range | Performed  | Pathologist  |
|           |       |           | At         | Signature    |
+-----------+-------+-----------+------------+--------------+
| ALT,      | 22    |           |            |              |
| External  |       |           |            |              |
+-----------+-------+-----------+------------+--------------+
 External Lab: AST (2019)
 
 
+-----------+-------+-----------+------------+--------------+
| Component | Value | Ref Range | Performed  | Pathologist  |
|           |       |           | At         | Signature    |
+-----------+-------+-----------+------------+--------------+
| AST,      | 30    |           |            |              |
| External  |       |           |            |              |
+-----------+-------+-----------+------------+--------------+
 External Lab: Alkaline Phosphatase (2019)
 
+-----------+-------+-----------+------------+--------------+
| Component | Value | Ref Range | Performed  | Pathologist  |
|           |       |           | At         | Signature    |
+-----------+-------+-----------+------------+--------------+
| ALP,      | 95    |           |            |              |
| External  |       |           |            |              |
+-----------+-------+-----------+------------+--------------+
 External Lab: Bilirubin, Total (2019)
 
+-------------+-------+-----------+------------+--------------+
| Component   | Value | Ref Range | Performed  | Pathologist  |
|             |       |           | At         | Signature    |
+-------------+-------+-----------+------------+--------------+
| Bilirubin,  | 0.7   |           |            |              |
| Total,      |       |           |            |              |
| External    |       |           |            |              |
+-------------+-------+-----------+------------+--------------+
 External Lab: Albumin (2019)
 
+-----------+-------+-----------+------------+--------------+
| Component | Value | Ref Range | Performed  | Pathologist  |
|           |       |           | At         | Signature    |
+-----------+-------+-----------+------------+--------------+
| Albumin,  | 3.6   |           |            |              |
| External  |       |           |            |              |
+-----------+-------+-----------+------------+--------------+
 External Lab: Protein, Total (2019)
 
+-----------+-------+-----------+------------+--------------+
| Component | Value | Ref Range | Performed  | Pathologist  |
|           |       |           | At         | Signature    |
+-----------+-------+-----------+------------+--------------+
| Protein,  | 5.9   |           |            |              |
| Total,    |       |           |            |              |
| External  |       |           |            |              |
+-----------+-------+-----------+------------+--------------+
 External Lab: Phosphorus (2019)
 
+-------------+-------+-----------+------------+--------------+
| Component   | Value | Ref Range | Performed  | Pathologist  |
|             |       |           | At         | Signature    |
+-------------+-------+-----------+------------+--------------+
| Phosphorus, | 3.0   |           |            |              |
|  External   |       |           |            |              |
+-------------+-------+-----------+------------+--------------+
 External Lab: Magnesium (2019)
 
+-------------+-------+-----------+------------+--------------+
| Component   | Value | Ref Range | Performed  | Pathologist  |
|             |       |           | At         | Signature    |
+-------------+-------+-----------+------------+--------------+
 
| Magnesium,  | 1.9   |           |            |              |
| External    |       |           |            |              |
+-------------+-------+-----------+------------+--------------+
 External Lab: Calcium (2019)
 
+-----------+-------+-----------+------------+--------------+
| Component | Value | Ref Range | Performed  | Pathologist  |
|           |       |           | At         | Signature    |
+-----------+-------+-----------+------------+--------------+
| Calcium,  | 9.9   |           |            |              |
| External  |       |           |            |              |
+-----------+-------+-----------+------------+--------------+
 External Lab: Carbon Dioxide (2019)
 
+-----------+-------+-----------+------------+--------------+
| Component | Value | Ref Range | Performed  | Pathologist  |
|           |       |           | At         | Signature    |
+-----------+-------+-----------+------------+--------------+
| Carbon    | 20    |           |            |              |
| Dioxide,  |       |           |            |              |
| External  |       |           |            |              |
+-----------+-------+-----------+------------+--------------+
 External Lab: Chloride (2019)
 
+------------+-------+-----------+------------+--------------+
| Component  | Value | Ref Range | Performed  | Pathologist  |
|            |       |           | At         | Signature    |
+------------+-------+-----------+------------+--------------+
| Chloride,  | 106   |           |            |              |
| External   |       |           |            |              |
+------------+-------+-----------+------------+--------------+
 External Lab: Potassium (2019)
 
+-------------+-------+-----------+------------+--------------+
| Component   | Value | Ref Range | Performed  | Pathologist  |
|             |       |           | At         | Signature    |
+-------------+-------+-----------+------------+--------------+
| Potassium,  | 4.6   |           |            |              |
| External    |       |           |            |              |
+-------------+-------+-----------+------------+--------------+
 External Lab: Sodium (2019)
 
+-----------+-------+-----------+------------+--------------+
| Component | Value | Ref Range | Performed  | Pathologist  |
|           |       |           | At         | Signature    |
+-----------+-------+-----------+------------+--------------+
| Sodium,   | 141   |           |            |              |
| External  |       |           |            |              |
+-----------+-------+-----------+------------+--------------+
 External Lab: CBC (2019)
 
+-----------+-------+-----------+------------+--------------+
| Component | Value | Ref Range | Performed  | Pathologist  |
|           |       |           | At         | Signature    |
+-----------+-------+-----------+------------+--------------+
| WBC,      | 4.2   |           |            |              |
| External  |       |           |            |              |
+-----------+-------+-----------+------------+--------------+
| HGB,      | 12.8  |           |            |              |
| External  |       |           |            |              |
 
+-----------+-------+-----------+------------+--------------+
| HCT,      | 38.9  |           |            |              |
| External  |       |           |            |              |
+-----------+-------+-----------+------------+--------------+
| PLT,      | 128   |           |            |              |
| External  |       |           |            |              |
+-----------+-------+-----------+------------+--------------+
| RBC,      | 3.70  |           |            |              |
| External  |       |           |            |              |
+-----------+-------+-----------+------------+--------------+
| MCV,      | 105   |           |            |              |
| External  |       |           |            |              |
+-----------+-------+-----------+------------+--------------+
| RDW,      | 13.7  |           |            |              |
| External  |       |           |            |              |
+-----------+-------+-----------+------------+--------------+
 External Lab: eGFR (2019)
 
+-----------+-------+-----------+------------+--------------+
| Component | Value | Ref Range | Performed  | Pathologist  |
|           |       |           | At         | Signature    |
+-----------+-------+-----------+------------+--------------+
| eGFR,     | 47    |           |            |              |
| External  |       |           |            |              |
+-----------+-------+-----------+------------+--------------+
 
 
 
+----------+
| Specimen |
+----------+
| Blood    |
+----------+
 External Lab: Creatinine (2019)
 
+-------------+-------+-----------+------------+--------------+
| Component   | Value | Ref Range | Performed  | Pathologist  |
|             |       |           | At         | Signature    |
+-------------+-------+-----------+------------+--------------+
| Creatinine, | 1.13  |           |            |              |
|  External   |       |           |            |              |
+-------------+-------+-----------+------------+--------------+
 
 
 
+----------+
| Specimen |
+----------+
| Blood    |
+----------+
 documented in this encounter
 
 Visit Diagnoses
 Not on filedocumented in this encounter

## 2020-01-08 NOTE — XMS
Cece is a 51 Encounter Summary
  Created on: 2020
 
 Viviana Ricci
 External Reference #: [id number]
 : 47
 Sex: Female
 
 Demographics
 
 
+-----------------------+----------------------+
| Address               | [address]      |
|                       | [place]  [address] |
+-----------------------+----------------------+
| Home Phone            | [phone]      |
+-----------------------+----------------------+
| Preferred Language    | Unknown              |
+-----------------------+----------------------+
| Marital Status        | Single               |
+-----------------------+----------------------+
| Christianity Affiliation | 1009                 |
+-----------------------+----------------------+
| Race                  | Unknown              |
+-----------------------+----------------------+
| Ethnic Group          | Unknown              |
+-----------------------+----------------------+
 
 
 Author
 
 
+--------------+--------------------------------------------+
| Author       | [facility]  |
|              | and [facility]                                |
+--------------+--------------------------------------------+
| Organization | [facility]  |
|              | and [facility]                                |
+--------------+--------------------------------------------+
| Address      | Unknown                                    |
+--------------+--------------------------------------------+
| Phone        | Unavailable                                |
+--------------+--------------------------------------------+
 
 
 
 Support
 
 
+----------------+--------------+---------------------+-----------------+
| Name           | Relationship | Address             | Phone           |
+----------------+--------------+---------------------+-----------------+
| Ada/Ed Radhames | ECON         | [address]              | [phone] |
|                |              | [place]  |                 |
|                |              |  [id number]              |                 |
+----------------+--------------+---------------------+-----------------+
 
 
 
 
 Care Team Providers
 
 
+-----------------------+------+-------------+
| Care Team Member Name | Role | Phone       |
+-----------------------+------+-------------+
 PCP  | Unavailable |
+-----------------------+------+-------------+
 
 
 
 Reason for Visit
 
 
+-------------------+----------+
| Reason            | Comments |
+-------------------+----------+
| Medication Refill |          |
+-------------------+----------+
 
 
 
 Encounter Details
 
 
+--------+--------+---------------------+----------------------+-------------------+
| Date   | Type   | Department          | Care Team            | Description       |
+--------+--------+---------------------+----------------------+-------------------+
| / | Refill |   [facility]         |   Marzena Moser  | Medication Refill |
| 2017   |        | NEPHROLOGY  [address]   | M, DO  [address]      |                   |
|        |        | POPLAR ST JOSE LUIS 100   | [address]      |                   |
|        |        | [place], [place]     | [place], [place]      |                   |
|        |        | [address]          | [address]  [phone]  |                   |
|        |        | [phone]        |  [phone] (Fax)  |                   |
+--------+--------+---------------------+----------------------+-------------------+
 
 
 
 Social History
 
 
+--------------+-------+-----------+--------+------+
| Tobacco Use  | Types | Packs/Day | Years  | Date |
|              |       |           | Used   |      |
+--------------+-------+-----------+--------+------+
| Never Smoker |       |           |        |      |
+--------------+-------+-----------+--------+------+
 
 
 
+---------------------+---+---+---+
| Smokeless Tobacco:  |   |   |   |
| Never Used          |   |   |   |
+---------------------+---+---+---+
 
 
 
+---------------------------------------------------------------+
| Comments: some second hand smoke exposure, but fairly minimal |
 
+---------------------------------------------------------------+
 
 
 
+-------------+-------------+---------+----------+
| Alcohol Use | Drinks/Week | oz/Week | Comments |
+-------------+-------------+---------+----------+
| No          |             |         |          |
+-------------+-------------+---------+----------+
 
 
 
+------------------+---------------+
| Sex Assigned at  | Date Recorded |
| Birth            |               |
+------------------+---------------+
| Not on file      |               |
+------------------+---------------+
 
 
 
+----------------+-------------+-------------+
| Job Start Date | Occupation  | Industry    |
+----------------+-------------+-------------+
| Not on file    | Not on file | Not on file |
+----------------+-------------+-------------+
 
 
 
+----------------+--------------+------------+
| Travel History | Travel Start | Travel End |
+----------------+--------------+------------+
 
 
 
+-------------------------------------+
| No recent travel history available. |
+-------------------------------------+
 documented as of this encounter
 
 Plan of Treatment
 
 
+--------+-----------+------------+----------------------+-------------------+
| Date   | Type      | Specialty  | Care Team            | Description       |
+--------+-----------+------------+----------------------+-------------------+
| / | Office    | Cardiology |   HellalfredoTonia,    |                   |
|    | Visit     |            | ARNP  [address]   |                   |
|        |           |            | St  [place], [place]  |                   |
|        |           |            | [address]  [phone]  |                   |
|        |           |            |  [phone] (Fax)  |                   |
+--------+-----------+------------+----------------------+-------------------+
| / | Hospital  | Radiology  |   Popeye Townsend, |                   |
|    | Encounter |            |  MD  [address] |                   |
|        |           |            |  St.  [place],   |                   |
|        |           |            | [place] [address]             |                   |
|        |           |            | [phone]         |                   |
|        |           |            | [phone] (Fax)   |                   |
+--------+-----------+------------+----------------------+-------------------+
| / | Surgery   | Radiology  |   Popeye Townsend, | CV EP PPM SYSTEM  |
 
|    |           |            |  MD  [address] | IMPLANT           |
|        |           |            |  St.  [place],   |                   |
|        |           |            | [place] [address]             |                   |
|        |           |            | [phone]         |                   |
|        |           |            | [phone] (Fax)   |                   |
+--------+-----------+------------+----------------------+-------------------+
| 02/10/ | Clinical  | Cardiology |                      |                   |
|    | Support   |            |                      |                   |
+--------+-----------+------------+----------------------+-------------------+
| / | Office    | Cardiology |   Tonia Wilson,    |                   |
|    | Visit     |            | ARNP  401 W Poplar   |                   |
|        |           |            | [place]  |                   |
|        |           |            | [address]  [phone]  |                   |
|        |           |            |  [phone] (Fax)  |                   |
+--------+-----------+------------+----------------------+-------------------+
| / | Off-Site  | Nephrology |   Marzena Moser  |                   |
|    | Visit     |            | DO ETIENNE  [address] [place] [address]      |                   |
|        |           |            | [address]      |                   |
|        |           |            | [place]      |                   |
|        |           |            | [address]  [phone]  |                   |
|        |           |            |  [phone] (Fax)  |                   |
+--------+-----------+------------+----------------------+-------------------+
 documented as of this encounter
 
 Visit Diagnoses
 
 
+---------------------------------+
| Diagnosis                       |
+---------------------------------+
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
+---------------------------------+
 documented in this encounter March  Kallie Rodriguez MD

## 2020-01-08 NOTE — XMS
Encounter Summary
  Created on: 2020
 
 Viviana Ricci
 External Reference #: [id number]
 : 47
 Sex: Female
 
 Demographics
 
 
+-----------------------+----------------------+
| Address               | [address]      |
|                       | [place]  [address] |
+-----------------------+----------------------+
| Home Phone            | [phone]      |
+-----------------------+----------------------+
| Preferred Language    | Unknown              |
+-----------------------+----------------------+
| Marital Status        | Single               |
+-----------------------+----------------------+
| Yazdanism Affiliation | [id number]                 |
+-----------------------+----------------------+
| Race                  | Unknown              |
+-----------------------+----------------------+
| Ethnic Group          | Unknown              |
+-----------------------+----------------------+
 
 
 Author
 
 
+--------------+--------------------------------------------+
| Author       | [facility]  |
|              | and [place]                                |
+--------------+--------------------------------------------+
| Organization | [facility]  |
|              | and [place]                                |
+--------------+--------------------------------------------+
| Address      | Unknown                                    |
+--------------+--------------------------------------------+
| Phone        | Unavailable                                |
+--------------+--------------------------------------------+
 
 
 
 Support
 
 
+----------------+--------------+---------------------+-----------------+
| Name           | Relationship | Address             | Phone           |
+----------------+--------------+---------------------+-----------------+
| Ada/Ed Radhames | ECON         | [address]              | [phone] |
|                |              | [place]  |                 |
|                |              |  [address]              |                 |
+----------------+--------------+---------------------+-----------------+
 
 
 
 
 Care Team Providers
 
 
+-----------------------+------+-------------+
| Care Team Member Name | Role | Phone       |
+-----------------------+------+-------------+
 PCP  | Unavailable |
+-----------------------+------+-------------+
 
 
 
 Encounter Details
 
 
+--------+-------------+---------------------+----------------------+----------------------+
| Date   | Type        | Department          | Care Team            | Description          |
+--------+-------------+---------------------+----------------------+----------------------+
| / | Orders Only |   [facility]         |   Marzena Moser  | Kidney replaced by   |
| 2018   |             | NEPHROLOGY  [address]   | M,   [address]      | transplant (Primary  |
|        |             | [address]   | [address]      | Dx); Dysuria         |
|        |             | [place], [place]     | [place], [place]      |                      |
|        |             | [address]          | [address]  [phone]  |                      |
|        |             | [phone]        |  [phone] (Fax)  |                      |
+--------+-------------+---------------------+----------------------+----------------------+
 
 
 
 Social History
 
 
+--------------+-------+-----------+--------+------+
| Tobacco Use  | Types | Packs/Day | Years  | Date |
|              |       |           | Used   |      |
+--------------+-------+-----------+--------+------+
| Never Smoker |       |           |        |      |
+--------------+-------+-----------+--------+------+
 
 
 
+---------------------+---+---+---+
| Smokeless Tobacco:  |   |   |   |
| Never Used          |   |   |   |
+---------------------+---+---+---+
 
 
 
+---------------------------------------------------------------+
| Comments: some second hand smoke exposure, but fairly minimal |
+---------------------------------------------------------------+
 
 
 
+-------------+-------------+---------+----------+
| Alcohol Use | Drinks/Week | oz/Week | Comments |
+-------------+-------------+---------+----------+
| No          |             |         |          |
+-------------+-------------+---------+----------+
 
 
 
 
+------------------+---------------+
| Sex Assigned at  | Date Recorded |
| Birth            |               |
+------------------+---------------+
| Not on file      |               |
+------------------+---------------+
 
 
 
+----------------+-------------+-------------+
| Job Start Date | Occupation  | Industry    |
+----------------+-------------+-------------+
| Not on file    | Not on file | Not on file |
+----------------+-------------+-------------+
 
 
 
+----------------+--------------+------------+
| Travel History | Travel Start | Travel End |
+----------------+--------------+------------+
 
 
 
+-------------------------------------+
| No recent travel history available. |
+-------------------------------------+
 documented as of this encounter
 
 Plan of Treatment
 
 
+--------+-----------+------------+----------------------+-------------------+
| Date   | Type      | Specialty  | Care Team            | Description       |
+--------+-----------+------------+----------------------+-------------------+
| / | Office    | Cardiology |   Tonia Wilson,    |                   |
| 2020   | Visit     |            | ARNJESSICA  [address]   |                   |
|        |           |            | St  [place]  |                   |
|        |           |            | [address]  [phone]  |                   |
|        |           |            |  [phone] (Fax)  |                   |
+--------+-----------+------------+----------------------+-------------------+
| / | Hospital  | Radiology  |   Popeye Townsend, |                   |
|    | Encounter |            |  MD  [address] |                   |
|        |           |            |  [place],   |                   |
|        |           |            | [place] [address]             |                   |
|        |           |            | [phone]         |                   |
|        |           |            | [phone] (Fax)   |                   |
+--------+-----------+------------+----------------------+-------------------+
| / | Surgery   | Radiology  |   Popeye Townsend, | CV EP PPM SYSTEM  |
|    |           |            |  MD  [address] | IMPLANT           |
|        |           |            |  St.  [place],   |                   |
|        |           |            | [place] [address]             |                   |
|        |           |            | [phone]         |                   |
|        |           |            | [phone] (Fax)   |                   |
+--------+-----------+------------+----------------------+-------------------+
| 02/10/ | Clinical  | Cardiology |                      |                   |
|    | Support   |            |                      |                   |
+--------+-----------+------------+----------------------+-------------------+
| / | Office    | Cardiology |   Tonia Wilson,    |                   |
|    | Visit     |            | [facility]  [address]   |                   |
 
|        |           |            |   [place]  |                   |
|        |           |            | [address]  [phone]  |                   |
|        |           |            |  [phone] (Fax)  |                   |
+--------+-----------+------------+----------------------+-------------------+
| / | Off-Site  | Nephrology |   Marzena Moser  |                   |
|    | Visit     |            | DO ETIENNE  [address] [place] [address]      |                   |
|        |           |            | [address]      |                   |
|        |           |            | [place]      |                   |
|        |           |            | [address]  [phone]  |                   |
|        |           |            |  [phone] (Fax)  |                   |
+--------+-----------+------------+----------------------+-------------------+
 documented as of this encounter
 
 Visit Diagnoses
 
 
+-------------------------------------------+
| Diagnosis                                 |
+-------------------------------------------+
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
+-------------------------------------------+
|   Dysuria                                 |
+-------------------------------------------+
 documented in this encounter

## 2020-01-08 NOTE — XMS
Encounter Summary
  Created on: 2020
 
 Viviana Ricci
 External Reference #: [id number]
 : 47
 Sex: Female
 
 Demographics
 
 
+-----------------------+----------------------+
| Address               | [address]      |
|                       | [place]  [address] |
+-----------------------+----------------------+
| Home Phone            | [phone]      |
+-----------------------+----------------------+
| Preferred Language    | Unknown              |
+-----------------------+----------------------+
| Marital Status        | Single               |
+-----------------------+----------------------+
| Hoahaoism Affiliation | [id number]                 |
+-----------------------+----------------------+
| Race                  | Unknown              |
+-----------------------+----------------------+
| Ethnic Group          | Unknown              |
+-----------------------+----------------------+
 
 
 Author
 
 
+--------------+--------------------------------------------+
| Author       | [facility]  |
|              | and [place]                                |
+--------------+--------------------------------------------+
| Organization | [facility]  |
|              | and [place]                                |
+--------------+--------------------------------------------+
| Address      | Unknown                                    |
+--------------+--------------------------------------------+
| Phone        | Unavailable                                |
+--------------+--------------------------------------------+
 
 
 
 Support
 
 
+----------------+--------------+---------------------+-----------------+
| Name           | Relationship | Address             | Phone           |
+----------------+--------------+---------------------+-----------------+
| Ada/Ed Radhames | ECON         | [address]              | [phone] |
|                |              | [place]  |                 |
|                |              |  [address]              |                 |
+----------------+--------------+---------------------+-----------------+
 
 
 
 
 Care Team Providers
 
 
+-----------------------+------+-------------+
| Care Team Member Name | Role | Phone       |
+-----------------------+------+-------------+
 PCP  | Unavailable |
+-----------------------+------+-------------+
 
 
 
 Reason for Visit
 
 
+-------------------+----------+
| Reason            | Comments |
+-------------------+----------+
| Medication Refill |          |
+-------------------+----------+
 
 
 
 Encounter Details
 
 
+--------+--------+---------------------+----------------------+-------------------+
| Date   | Type   | Department          | Care Team            | Description       |
+--------+--------+---------------------+----------------------+-------------------+
| / | Refill |   [facility]         |   Marzena Moser  | Medication Refill |
| 2016   |        | NEPHROLOGY  [address]   | M, DO  [address]      |                   |
|        |        | [address]   | [address]      |                   |
|        |        | [place], [place]     | [place], [place]      |                   |
|        |        | [address]          | [address]  [phone]  |                   |
|        |        | [phone]        |  [phone] (Fax)  |                   |
+--------+--------+---------------------+----------------------+-------------------+
 
 
 
 Social History
 
 
+--------------+-------+-----------+--------+------+
| Tobacco Use  | Types | Packs/Day | Years  | Date |
|              |       |           | Used   |      |
+--------------+-------+-----------+--------+------+
| Never Smoker |       |           |        |      |
+--------------+-------+-----------+--------+------+
 
 
 
+---------------------+---+---+---+
| Smokeless Tobacco:  |   |   |   |
| Never Used          |   |   |   |
+---------------------+---+---+---+
 
 
 
+---------------------------------------------------------------+
| Comments: some second hand smoke exposure, but fairly minimal |
 
+---------------------------------------------------------------+
 
 
 
+-------------+-------------+---------+----------+
| Alcohol Use | Drinks/Week | oz/Week | Comments |
+-------------+-------------+---------+----------+
| No          |             |         |          |
+-------------+-------------+---------+----------+
 
 
 
+------------------+---------------+
| Sex Assigned at  | Date Recorded |
| Birth            |               |
+------------------+---------------+
| Not on file      |               |
+------------------+---------------+
 
 
 
+----------------+-------------+-------------+
| Job Start Date | Occupation  | Industry    |
+----------------+-------------+-------------+
| Not on file    | Not on file | Not on file |
+----------------+-------------+-------------+
 
 
 
+----------------+--------------+------------+
| Travel History | Travel Start | Travel End |
+----------------+--------------+------------+
 
 
 
+-------------------------------------+
| No recent travel history available. |
+-------------------------------------+
 documented as of this encounter
 
 Plan of Treatment
 
 
+--------+-----------+------------+----------------------+-------------------+
| Date   | Type      | Specialty  | Care Team            | Description       |
+--------+-----------+------------+----------------------+-------------------+
| / | Office    | Cardiology |   HellalfredoTonia,    |                   |
|    | Visit     |            | ARNP  [address]   |                   |
|        |           |            | St  [place], [place]  |                   |
|        |           |            | [address]  [phone]  |                   |
|        |           |            |  [phone] (Fax)  |                   |
+--------+-----------+------------+----------------------+-------------------+
| / | Hospital  | Radiology  |   Popeye Townsend, |                   |
|    | Encounter |            |  MD  [address] |                   |
|        |           |            |  St.  [place],   |                   |
|        |           |            | [place] [address]             |                   |
|        |           |            | [phone]         |                   |
|        |           |            | [phone] (Fax)   |                   |
+--------+-----------+------------+----------------------+-------------------+
| / | Surgery   | Radiology  |   Popeye Townsend, | CV EP PPM SYSTEM  |
 
|    |           |            |  MD  [address] | IMPLANT           |
|        |           |            |  St.  [place],   |                   |
|        |           |            | [place] [address]             |                   |
|        |           |            | [phone]         |                   |
|        |           |            | [phone] (Fax)   |                   |
+--------+-----------+------------+----------------------+-------------------+
| 02/10/ | Clinical  | Cardiology |                      |                   |
|    | Support   |            |                      |                   |
+--------+-----------+------------+----------------------+-------------------+
| / | Office    | Cardiology |   Tonia Wilson,    |                   |
|    | Visit     |            | ARNP  [address]   |                   |
|        |           |            | [place]  |                   |
|        |           |            | [address]  [phone]  |                   |
|        |           |            |  [phone] (Fax)  |                   |
+--------+-----------+------------+----------------------+-------------------+
| / | Off-Site  | Nephrology |   Marzena Moser  |                   |
|    | Visit     |            | DO ETIENNE  [address] [place] [address]      |                   |
|        |           |            | [address]      |                   |
|        |           |            | [place]      |                   |
|        |           |            | [address]  [phone]  |                   |
|        |           |            |  [phone] (Fax)  |                   |
+--------+-----------+------------+----------------------+-------------------+
 documented as of this encounter
 
 Visit Diagnoses
 
 
+----------------------------------------------------------------------------------------+
| Diagnosis                                                                              |
+----------------------------------------------------------------------------------------+
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
| left (HCC) - Primary                                                                   |
+----------------------------------------------------------------------------------------+
 documented in this encounter

## 2020-01-08 NOTE — XMS
Encounter Summary
  Created on: 2020
 
 Viviana Ricci
 External Reference #: [id number]
 : 47
 Sex: Female
 
 Demographics
 
 
+-----------------------+----------------------+
| Address               | [address]      |
|                       | [place]  [address] |
+-----------------------+----------------------+
| Home Phone            | [phone]      |
+-----------------------+----------------------+
| Preferred Language    | Unknown              |
+-----------------------+----------------------+
| Marital Status        | Single               |
+-----------------------+----------------------+
| Sabianism Affiliation | [id number]                 |
+-----------------------+----------------------+
| Race                  | Unknown              |
+-----------------------+----------------------+
| Ethnic Group          | Unknown              |
+-----------------------+----------------------+
 
 
 Author
 
 
+--------------+--------------------------------------------+
| Author       | [facility]  |
|              | and [facility]                                |
+--------------+--------------------------------------------+
| Organization | [facility]  |
|              | and [facility]                                |
+--------------+--------------------------------------------+
| Address      | Unknown                                    |
+--------------+--------------------------------------------+
| Phone        | Unavailable                                |
+--------------+--------------------------------------------+
 
 
 
 Support
 
 
+----------------+--------------+---------------------+-----------------+
| Name           | Relationship | Address             | Phone           |
+----------------+--------------+---------------------+-----------------+
| Ada/Ed Radhames | ECON         | [address]              | [phone] |
|                |              | [place]  |                 |
|                |              |  [id number]              |                 |
+----------------+--------------+---------------------+-----------------+
 
 
 
 
 Care Team Providers
 
 
+-----------------------+------+-------------+
| Care Team Member Name | Role | Phone       |
+-----------------------+------+-------------+
 PCP  | Unavailable |
+-----------------------+------+-------------+
 
 
 
 Reason for Visit
 
 
+------------------+----------+
| Reason           | Comments |
+------------------+----------+
| Follow-up        |          |
+------------------+----------+
| Coronary Artery  |          |
| Disease          |          |
+------------------+----------+
| Hypertension     |          |
+------------------+----------+
| Hyperlipidemia   |          |
+------------------+----------+
 
 
 
 Encounter Details
 
 
+--------+---------+----------------------+----------------------+-------------------+
| Date   | Type    | Department           | Care Team            | Description       |
+--------+---------+----------------------+----------------------+-------------------+
| / | Office  |   [facility]          |   Tonia Wilson,    | Coronary artery   |
|    | Visit   | CARDIOLOGY  [address]    | ARNP  [address]   | disease (Primary  |
|        |         | [address]  [place] | [address]  [facility] [place]  | Dx); HTN          |
|        |         |  [place] [address]       | [address]  [phone]  | (hypertension);   |
|        |         | [phone]         |  [phone] (Fax)  | Hyperlipidemia    |
+--------+---------+----------------------+----------------------+-------------------+
 
 
 
 Social History
 
 
+--------------+-------+-----------+--------+------+
| Tobacco Use  | Types | Packs/Day | Years  | Date |
|              |       |           | Used   |      |
+--------------+-------+-----------+--------+------+
| Never Smoker |       |           |        |      |
+--------------+-------+-----------+--------+------+
 
 
 
+---------------------+---+---+---+
| Smokeless Tobacco:  |   |   |   |
| Never Used          |   |   |   |
 
+---------------------+---+---+---+
 
 
 
+---------------------------------------------------------------+
| Comments: some second hand smoke exposure, but fairly minimal |
+---------------------------------------------------------------+
 
 
 
+-------------+-------------+---------+----------+
| Alcohol Use | Drinks/Week | oz/Week | Comments |
+-------------+-------------+---------+----------+
| No          |             |         |          |
+-------------+-------------+---------+----------+
 
 
 
+------------------+---------------+
| Sex Assigned at  | Date Recorded |
| Birth            |               |
+------------------+---------------+
| Not on file      |               |
+------------------+---------------+
 
 
 
+----------------+-------------+-------------+
| Job Start Date | Occupation  | Industry    |
+----------------+-------------+-------------+
| Not on file    | Not on file | Not on file |
+----------------+-------------+-------------+
 
 
 
+----------------+--------------+------------+
| Travel History | Travel Start | Travel End |
+----------------+--------------+------------+
 
 
 
+-------------------------------------+
| No recent travel history available. |
+-------------------------------------+
 documented as of this encounter
 
 Last Filed Vital Signs
 
 
+-------------------+-------------------+----------------------+----------+
| Vital Sign        | Reading           | Time Taken           | Comments |
+-------------------+-------------------+----------------------+----------+
| Blood Pressure    | 92/64             | 2014 12:58 PM  |          |
|                   |                   | PST                  |          |
+-------------------+-------------------+----------------------+----------+
| Pulse             | 70                | 2014 12:58 PM  |          |
|                   |                   | PST                  |          |
+-------------------+-------------------+----------------------+----------+
| Temperature       | -                 | -                    |          |
+-------------------+-------------------+----------------------+----------+
 
| Respiratory Rate  | 20                | 2014 12:58 PM  |          |
|                   |                   | PST                  |          |
+-------------------+-------------------+----------------------+----------+
| Oxygen Saturation | -                 | -                    |          |
+-------------------+-------------------+----------------------+----------+
| Inhaled Oxygen    | -                 | -                    |          |
| Concentration     |                   |                      |          |
+-------------------+-------------------+----------------------+----------+
| Weight            | 122.9 kg (271 lb) | 2014 12:58 PM  |          |
|                   |                   | PST                  |          |
+-------------------+-------------------+----------------------+----------+
| Height            | 167.6 cm (5' 6")  | 2014 12:58 PM  |          |
|                   |                   | PST                  |          |
+-------------------+-------------------+----------------------+----------+
| Body Mass Index   | 43.74             | 2014 12:58 PM  |          |
|                   |                   | PST                  |          |
+-------------------+-------------------+----------------------+----------+
 documented in this encounter
 
 Progress Notes
 Tonia Wilson ARNP - 2014  1:18 PM PSTFormatting of this note might be different fr
om the original.
    
 
 PATIENT NAME:  Viviana Ricci  
 : 1945:         AGE: 68 y.o.
  PRIMARY CARE:
 Marzena Moser DO 
 
 OUTPATIENT FOLLOW UP VISIT
 
 Date of Service: 2014
 
 HISTORY OF PRESENT ILLNESS:
 Viviana Ricci is a 68 y.o. female with a history of CAD post CABG x 3 in , history of 
diabetes, renal failure post a renal transplantation, morbid obesity, inactivity, hypertensi
on, hyperlipidemia, pulmonary hypertension and severe arthritis.  She is being seen today fo
r follow up coronary artery disease.
 
 She was last seen 14 at which time she was planning to have knee surgery.  Since that 
time, she reports she was trying to do physical therapy prior to surgery, but then she start
ed to have problems with her back, requiring injection last month, which helped.  She has fe
lt well from a heart standpoint.  shortness of breath with activity such as carrying her yani
ceries up to 3 steps into her house, primarily when she is in more pain, and not as much on 
other days when her pain in her back and knee is better.  She has not had any symptoms of ch
est pain at rest or with activity.  She denies any lightheadedness or dizziness or palpitati
ons.  She chronically gets a little bit of ankle swelling by the end of the day, and she fee
ls that it is worse on left than right, and taking furosemide did not seem to help, but it h
as been minimal.  She lays flat at night on one pillow with her CPAP machine in place, and h
as no symptoms of shortness of breath with this.
 
 MEDICAL, SURGICAL, AND PERSONAL HISTORY
 Past Medical, Surgical, Family, and Social History are reviewed in EP[facility].
 
 CURRENT PROBLEMS
 Patient Active Problem List 
 Diagnosis 
   Chronic low back pain 
   Hypothyroidism 
   Hyperlipidemia 
 
   Complications of transplanted kidney 
   Movement disorder 
   Diabetes mellitus type II, uncontrolled  
   Pulmonary hypertension  
   Coronary artery disease 
   Unspecified hypertensive kidney disease with chronic kidney disease stage I through sta
ge IV, or unspecified 
   JACK on CPAP 
   Obesity hypoventilation syndrome 
   Kidney replaced by transplant 
   Secondary hyperparathyroidism 
   Dyspnea 
   FSGS (focal segmental glomerulosclerosis) 
   Murmur 
   Cardiomegaly 
   HTN (hypertension) 
   PLMD (periodic limb movement disorder) 
   Chest pain 
 
 CURRENT MEDICATIONS
 Current Outpatient Prescriptions 
 Medication Sig Dispense Refill 
   allopurinol (ZYLOPRIM) 100 mg tablet Take 1 tablet by mouth Daily.  30 tablet  11 
   aspirin 81 MG EC tablet Take 81 mg by mouth Daily.     
   cholecalciferol (VITAMIN D-3) 2000 UNITS TABS Take 5,000 Units by mouth Once a week.   
  
   cinacalcet (SENSIPAR) 30 mg tablet Take 1 tablet by mouth Daily.  30 tablet  12 
   fludrocortisone (FLORINEF) 0.1 mg tablet Take 1 tablet by mouth Every other day.  45 ta
blet  2 
   fluticasone (FLOVENT HFA) 220 mcg/puff inhaler Inhale 1 puff into the lungs 2 times shante
ly. Rinse mouth after use.  1 Inhaler  11 
   furosemide (LASIX) 40 mg tablet Take 40 mg by mouth Daily as needed.     
   glucose blood VI test strips (ONE TOUCH ULTRA TEST) strip Check blood sugar before each
 meal and as directed  100 each  12 
   insulin glargine (LANTUS) 100 units/mL injection Inject 20 Units under the skin every m
orning.  10 mL  11 
   insulin lispro (HUMALOG) 100 units/mL injection Inject subcutaneously before meals acco
rding to sliding scale  10 vial  11 
   Insulin Syringe-Needle U-100 (BD INSULIN SYRINGE ULTRAFINE) 31G X 5/16" 0.5 ML MISC Use
 before meals and as directed.  100 each  11 
   levothyroxine (LEVOTHROID) 50 mcg tablet Take 1 tablet by mouth Daily.  30 tablet  11 
   lisinopril (PRINIVIL,ZESTRIL) 30 MG tablet Take 1 tablet by mouth Daily.  90 tablet  3 
   loperamide (ANTI-DIARRHEAL) 2 mg capsule Take 1 capsule by mouth 4 times daily as neede
d.  60 capsule  5 
   magnesium oxide (MAG-OX) 400 mg tablet Take 1 tablet by mouth 2 times daily.  62 tablet
  12 
   metoprolol tartrate (LOPRESSOR) 25 mg tablet Take 1 tablet by mouth 2 times daily.  60 
tablet  11 
   mycophenolate (CELLCEPT) 250 mg capsule Take 250 mg by mouth 3 times daily.     
   omeprazole (PRILOSEC) 20 mg capsule Take one capsule by mouth once daily on an empty st
omach  90 capsule  3 
   predniSONE (DELTASONE) 5 mg tablet Take 1 tablet by mouth Daily.  90 tablet  3 
   Prenatal Multivit-Min-Fe-FA (PRENATAL VITAMINS) 0.8 MG TABS Take 0.8 mg by mouth Daily.
  30 each  11 
   Respiratory Therapy Supplies MISC Decrease CPAP to 12-18 cmH2O Diagnosis Code(s)327.23.
 Please send order to [facility].  1 each  0 
   rosuvastatin (CRESTOR) 20 mg tablet Take 1 tablet by mouth nightly.  30 tablet  11 
   tacrolimus (PROGRAF) 0.5 mg capsule Take 1 capsule by mouth 2 times daily. With 1 mg to
 equal 1.5 mg bid  60 capsule  11 
   tacrolimus (PROGRAF) 1 mg capsule Take 1 mg by mouth twice daily with 0.5 mg to equal 1
 
.5 mg twice daily  60 capsule  11 
   valsartan (DIOVAN) 160 mg tablet Take 1 tablet by mouth Daily.  30 tablet  11 
   
 
 ALLERGIES
 No Known Allergies
 
 ROS
 Review of Systems 
 Constitutional: Positive for malaise/fatigue. 
 Respiratory: Positive for shortness of breath.  
 Cardiovascular: Positive for leg swelling (on the left). Negative for chest pain, palpitati
ons, orthopnea and PND. 
 Gastrointestinal: Negative for abdominal pain. 
 Musculoskeletal: Positive for back pain and joint pain. 
 Neurological: Negative for dizziness and loss of consciousness. 
 
 OBJECTIVE:  
 
 PHYSICAL EXAM
 BP 92/64 | Pulse 70 | Resp 20 | Ht 1.676 m (5' 6") | Wt 122.925 kg (271 lb) | BMI 43.76 kg/
m2
 Physical Exam 
 Constitutional: She is oriented to person, place, and time. She appears well-developed and 
well-nourished. 
      Adult female in no acute distress 
 Neck: Normal carotid pulses and no JVD (Difficult to fully evaluate due to body habitus) pr
esent. Carotid bruit is not present. 
 Cardiovascular: Normal rate, regular rhythm, S1 normal, S2 normal and intact distal pulses.
  PMI is not displaced.  Exam reveals no gallop and no friction rub.  
 Murmur heard.
  Holosystolic murmur is present with a grade of 1/6 
 Pulses:
      Carotid pulses are 2+ on the right side, and 2+ on the left side.
      Posterior tibial pulses are 1+ on the right side, and 1+ on the left side. 
 Pulmonary/Chest: Effort normal and breath sounds normal. No accessory muscle usage. No resp
iratory distress. She has no wheezes. She has no rhonchi. She has no rales. 
 Abdominal: Soft. Normal appearance and normal aorta. She exhibits no abdominal bruit. There
 is no hepatosplenomegaly. There is no tenderness. 
      Obese 
 Musculoskeletal: She exhibits edema (trace bilaterally at ankles, left worse than right, so
me venous stasis skin changes noted below knees ). 
 Neurological: She is alert and oriented to person, place, and time. Gait (using 4 wheeled w
alker) abnormal. 
 Skin: Skin is warm and dry. No cyanosis. Nails show no clubbing. 
 Psychiatric: She has a normal mood and affect. Her mood appears not anxious. She does not e
xhibit a depressed mood. 
 
 ECG: I personally reviewed EKG tracing from 14: Sinus rhythm with first degree AV bloc
k, rate of 60 beats per minute.  See scanned document for further interpretation. 
 
 LAB RESULTS: 
 LIPID
 Lab Results 
 Component Value Date 
  CHOL 162 2013 
  TRIG 225 2013 
  HDL 45 2013 
  LDL 72 2013 
  LDLEX 76 2014 
 
  HDLEX 52.6 2014 
  TRIGEX 186* 2014 
  CHOLEX 166 2014 
 
 CHEMISTRY
 Lab Results 
 Component Value Date 
   2014 
   2014 
  K 5.4 2014 
   2014 
  CO2 22 2014 
  CALCIUM 10.4 2014 
  ALKPHOS 100 2014 
  AST 20 7/10/2013 
  ASTEX 24 2014 
  ALT 14 7/10/2013 
  ALTEX 17 2014 
  BILITOT 0.6 2014 
  CREA 1.7 7/10/2013 
  BUN 40 2014 
  EGFR 31.0 7/10/2013 
  EGFREX 36* 2014 
  CREEX 1.44* 2014 
 
 HEMATOLOGY
 Lab Results 
 Component Value Date 
  WBC 4.6 7/10/2013 
  HGB 11.9 7/10/2013 
  HCT 35.7 7/10/2013 
  * 7/10/2013 
  HGBEX 13.5 2014 
 
 I reviewed records from Dr. Moser for office visit on 14.
 
 ASSESSMENT:  
 
 1. Coronary artery disease:
 A.  Status post CABG x 3 in  with LIMA to the LAD, SVG to the OM1 and OM2.
             B.  A persantine sestamibi stress test on 07 revealed a medium sized, mod
erate in severity fixed defect of the anterior wall, and a small sized mild in severity fixe
d defect of the inferior wall, LVEF by gated SPECT was 66%.
  C.  Bilateral heart catheterization on 05/03/10, shows severe two vessel coronary artery d
isease, a chronic occlusion through the proximal portion of the left anterior descending art
adele and a chronic occlusion through the proximal portion of the left circumflex artery, rani
ts: open left internal mammary artery graft to the mid left anterior descending artery, open
 saphenous vein grafts to the OM1 and OM2, mildly dilated left ventricular cavity with a nor
mal left systolic function, LVEF 70%, mild to moderate pulmonary hypertension and a normal c
ardiac output, coronary circulation is right dominant, normal system pressure.
  D.  Persantine nuclear medicine stress test 14 shows a normal myocardial perfusion im
aging study with normal left ventricular size, wall thickness, LVEF by gated SPECT of 78%.  
  E.  Today she denies any chest pain. There is no signs and symptoms of overt congestive he
art failure.  She is in a class II of [employer] [place] Heart Association functional class.  There is
 no fluid retention on physical examination. 
 
 2.  Right sided heart failure/ cor pulmonale / severe pulmonary hypertension:
             A. The echocardiogram from 02/19/10 revealed moderate bi-atrial dilatation and 
normal left ventricular size, wall thickness and motion, LVEF is 55-60%, dilated right ventr
icle with moderate hypo-kinesis, moderate tricuspid valve regurgitation, severe pulmonary hy
 
pertension with a peak systolic pressure of 80 mmHg, and a small pericardial effusion. 
  B. Echocardiogram from 4/15/14 showed Mild left atrial dilatation. Normal left ventricular
 size, wall thickness and motion. Preserved  left ventricular systolic function. LVEF is 70-
75%. Grade 1 left ventricular diastole dysfunction. Mild tricuspid valve regurgitation. Mild
 thickened trileaflet aortic valve with adequate opening. A mild aortic valve insufficiency.
 Normal right-sided pressure.
 
 3. Hypertension:
  A.  Today her blood pressure is well-controlled.
 
 4.  Hyperlipidemia.
 
 5.  Obstructive sleep apnea:
  A.  She uses a CPAP machine at night with 2 L of oxygen bled in.
 
 6.  Morbid obesity.
 
 7. Type II diabetes.
 
 8. Chronic kidney disease:
  A.  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 Followed by Dr. Moser.
 
 PLAN:  
 
 She will continue with her current medical regimen. 
 She will follow up in 1 year, or sooner with concerns. 
 
 I, BELKIS Arredondo, saw this patient under the direct supervision of Popeye Townsend MD
 
 Electronically signed by: 
 BELKIS Arredondo on 2014 at 13:18
 
 Portions of this chart may have been created with Dragon voice recognition software. Occasi
onal wrong-word or   sound-alike  substitutions may have occurred due to the inherent naqvi
itations of voice recognition software. Please read the chart carefully and recognize, using
 context, where these substitutions have occurred.Electronically signed by NATHANIEL Arredondo NP at 2014  5:14 PM PSTdocumented in this encounter
 
 Plan of Treatment
 
 
+--------+-----------+------------+----------------------+-------------------+
| Date   | Type      | Specialty  | Care Team            | Description       |
+--------+-----------+------------+----------------------+-------------------+
| / | Office    | Cardiology |   Tonia Wilson,    |                   |
2020   | Visit     |            | ARNP  [address]   |                   |
|        |           |            |   [place]  |                   |
|        |           |            | [address] [phone]  |                   |
|        |           |            |  [phone] (Fax)  |                   |
+--------+-----------+------------+----------------------+-------------------+
| / | Hospital  | Radiology  |   Popeye Townsend, |                   |
2020   | Encounter |            |  MD  [address] |                   |
|        |           |            |  [facility]   |                   |
|        |           |            | [place] [address]             |                   |
|        |           |            | [phone]         |                   |
|        |           |            | [phone] (Fax)   |                   |
+--------+-----------+------------+----------------------+-------------------+
| / | Surgery   | Radiology  |   Popeye Townsend, | CV EP PPM SYSTEM  |
2020   |           |            |  MD  [address] | IMPLANT           |
 
|        |           |            |  [place],   |                   |
|        |           |            | [place] [address]             |                   |
|        |           |            | [phone]         |                   |
|        |           |            | [phone] (Fax)   |                   |
+--------+-----------+------------+----------------------+-------------------+
| 02/10/ | Clinical  | Cardiology |                      |                   |
|    | Support   |            |                      |                   |
+--------+-----------+------------+----------------------+-------------------+
| / | Office    | Cardiology |   Tonia Wilson,    |                   |
|    | Visit     |            | ARNJESSICA  [address]   |                   |
|        |           |            | [place]  |                   |
|        |           |            | [address]  [phone]  |                   |
|        |           |            |  [phone] (Fax)  |                   |
+--------+-----------+------------+----------------------+-------------------+
| / | Off-Site  | Nephrology |   Shanti Marzena  |                   |
|    | Visit     |            | DO ETIENNE  [address]      |                   |
|        |           |            | [address]      |                   |
|        |           |            | [place]      |                   |
|        |           |            | [address]  [phone]  |                   |
|        |           |            |  [phone] (Fax)  |                   |
+--------+-----------+------------+----------------------+-------------------+
 documented as of this encounter
 
 Visit Diagnoses
 
 
+---------------------------------------------------------------------------------------+
| Diagnosis                                                                             |
+---------------------------------------------------------------------------------------+
|   Coronary artery disease - Primary  Coronary atherosclerosis of unspecified type of  |
| vessel, native or graft                                                               |
+---------------------------------------------------------------------------------------+
|   HTN (hypertension)  Unspecified essential hypertension                              |
+---------------------------------------------------------------------------------------+
|   Hyperlipidemia  Other and unspecified hyperlipidemia                                |
+---------------------------------------------------------------------------------------+
 documented in this encounter

## 2020-01-08 NOTE — XMS
Encounter Summary
  Created on: 2020
 
 Viviana Ricci
 External Reference #: [id number]
 : 47
 Sex: Female
 
 Demographics
 
 
+-----------------------+----------------------+
| Address               | [address]      |
|                       | [place]  [address] |
+-----------------------+----------------------+
| Home Phone            | [phone]      |
+-----------------------+----------------------+
| Preferred Language    | Unknown              |
+-----------------------+----------------------+
| Marital Status        | Single               |
+-----------------------+----------------------+
| Protestant Affiliation | [id number]                 |
+-----------------------+----------------------+
| Race                  | Unknown              |
+-----------------------+----------------------+
| Ethnic Group          | Unknown              |
+-----------------------+----------------------+
 
 
 Author
 
 
+--------------+--------------------------------------------+
| Author       | [facility]  |
|              | and [facility]                                |
+--------------+--------------------------------------------+
| Organization | [facility]  |
|              | and [facility]                                |
+--------------+--------------------------------------------+
| Address      | Unknown                                    |
+--------------+--------------------------------------------+
| Phone        | Unavailable                                |
+--------------+--------------------------------------------+
 
 
 
 Support
 
 
+----------------+--------------+---------------------+-----------------+
| Name           | Relationship | Address             | Phone           |
+----------------+--------------+---------------------+-----------------+
| Ada/Ed Radhames | ECON         | [address]              | [phone] |
|                |              | [place]  |                 |
|                |              |  [id number]              |                 |
+----------------+--------------+---------------------+-----------------+
 
 
 
 
 Care Team Providers
 
 
+------------------------+------+-----------------+
| Care Team Member Name  | Role | Phone           |
+------------------------+------+-----------------+
| Marzena Moser DO | PCP  | [phone] |
+------------------------+------+-----------------+
 
 
 
 Reason for Visit
 Evaluate & Treat (Routine)
 
+------------+--------+------------+--------------+--------------+---------------+
| Status     | Reason | Specialty  | Diagnoses /  | Referred By  | Referred To   |
|            |        |            | Procedures   | Contact      | Contact       |
+------------+--------+------------+--------------+--------------+---------------+
| Authorized |        | Nephrology |   Diagnoses  |   Shanti  |   Stroemel,   |
|            |        |            |  Unspecified | Marzena M,   | Marzena M, DO |
|            |        |            |              | DO  [address] |   [address]    |
|            |        |            | complication |  [address]   |
|            |        |            |  of kidney   |  [address]  [place]  | [address]  [place]    |
|            |        |            | transplant   | [place]    | [place]     |
|            |        |            | FSGS (focal  | [address]        | [address]  Phone: |
|            |        |            | segmental    | Phone:       |  [phone] |
|            |        |            | glomeruloscl | [phone] |   Fax:        |
|            |        |            | erosis)      |   Fax:       | [phone]  |
|            |        |            | Hypertension | [phone] |               |
|            |        |            | , essential  |              |               |
|            |        |            |  Procedures  |              |               |
|            |        |            |  [facility] OFF[facility]   |              |               |
|            |        |            | OUTPATIENT   |              |               |
|            |        |            | VISIT 25     |              |               |
|            |        |            | MINUTES      |              |               |
+------------+--------+------------+--------------+--------------+---------------+
 
 
 
 
 Encounter Details
 
 
+--------+-----------+---------------------+----------------------+----------------------+
| Date   | Type      | Department          | Care Team            | Description          |
+--------+-----------+---------------------+----------------------+----------------------+
| / | Off-Site  |   [facility]         |   Marzena Moser  | Kidney replaced by   |
|    | Visit     | NEPHROLOGY  [address]   | M, DO  [address]      | transplant (Primary  |
|        |           | [address]   | [address]      | Dx); Type 2 diabetes |
|        |           | [place], [place]     | [place]      |  mellitus with       |
|        |           | [address]          | [address]  [phone]  | chronic kidney       |
|        |           | [phone]        |  [phone] (Fax)  | disease, without     |
|        |           |                     |                      | long-term current    |
|        |           |                     |                      | use of insulin,      |
|        |           |                     |                      | unspecified CKD      |
|        |           |                     |                      | stage (HCC); FSGS    |
|        |           |                     |                      | (focal segmental     |
|        |           |                     |                      | glomerulosclerosis); |
|        |           |                     |                      |  Hypertension,       |
 
|        |           |                     |                      | essential            |
+--------+-----------+---------------------+----------------------+----------------------+
 
 
 
 Social History
 
 
+--------------+-------+-----------+--------+------+
| Tobacco Use  | Types | Packs/Day | Years  | Date |
|              |       |           | Used   |      |
+--------------+-------+-----------+--------+------+
| Never Smoker |       |           |        |      |
+--------------+-------+-----------+--------+------+
 
 
 
+---------------------+---+---+---+
| Smokeless Tobacco:  |   |   |   |
| Never Used          |   |   |   |
+---------------------+---+---+---+
 
 
 
+---------------------------------------------------------------+
| Comments: some second hand smoke exposure, but fairly minimal |
+---------------------------------------------------------------+
 
 
 
+-------------+-------------+---------+----------+
| Alcohol Use | Drinks/Week | oz/Week | Comments |
+-------------+-------------+---------+----------+
| No          |             |         |          |
+-------------+-------------+---------+----------+
 
 
 
+------------------+---------------+
| Sex Assigned at  | Date Recorded |
| Birth            |               |
+------------------+---------------+
| Not on file      |               |
+------------------+---------------+
 
 
 
+----------------+-------------+-------------+
| Job Start Date | Occupation  | Industry    |
+----------------+-------------+-------------+
| Not on file    | Not on file | Not on file |
+----------------+-------------+-------------+
 
 
 
+----------------+--------------+------------+
| Travel History | Travel Start | Travel End |
+----------------+--------------+------------+
 
 
 
 
+-------------------------------------+
| No recent travel history available. |
+-------------------------------------+
 documented as of this encounter
 
 Last Filed Vital Signs
 
 
+-------------------+-------------------+----------------------+----------+
| Vital Sign        | Reading           | Time Taken           | Comments |
+-------------------+-------------------+----------------------+----------+
| Blood Pressure    | 132/82            | 2019  4:18 PM  |          |
|                   |                   | PDT                  |          |
+-------------------+-------------------+----------------------+----------+
| Pulse             | -                 | -                    |          |
+-------------------+-------------------+----------------------+----------+
| Temperature       | 36   C (96.8   F) | 2019  4:18 PM  |          |
|                   |                   | PDT                  |          |
+-------------------+-------------------+----------------------+----------+
| Respiratory Rate  | -                 | -                    |          |
+-------------------+-------------------+----------------------+----------+
| Oxygen Saturation | -                 | -                    |          |
+-------------------+-------------------+----------------------+----------+
| Inhaled Oxygen    | -                 | -                    |          |
| Concentration     |                   |                      |          |
+-------------------+-------------------+----------------------+----------+
| Weight            | 116.1 kg (256 lb) | 2019  4:18 PM  |          |
|                   |                   | PDT                  |          |
+-------------------+-------------------+----------------------+----------+
| Height            | -                 | -                    |          |
+-------------------+-------------------+----------------------+----------+
| Body Mass Index   | 41.32             | 2018  2:31 PM  |          |
|                   |                   | PDT                  |          |
+-------------------+-------------------+----------------------+----------+
 documented in this encounter
 
 Progress Notes
 Vicky Urias, Medical Student - 2019  4:00 PM PDTFormatting of this note might be d
ifferent from the original.
 Subjective: NEPHROLOGY  
  
 Patient ID: Viviana Ricci is a 73 y.o. female.
 
 HPI Comments: 
 Followup for this  73 YOWF   who is s/p a renal allograft, 05, at [facility] with remote all
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
 hyperlipidemia, SHPTH,  previous low back pain, morbid obesity/JACK, chronic pulmonary  hype
rtension, historically related to centripetal obesity, and  CAD, s/p CABG x3 vessels,. 
 
 She states that she has drastically changed her eating habits to address her weight problem
, and she is now referring to it as an "unhealthy addiction."  She looked into going to  PhD
 counselor, but it was too expensive.    She states that she has given up "pop, candy, karoline
late, cake, baked things with white flour and white sugar."   As a result, she states that s
he is eating one meal per day. She admits that it has diminished her PO fluid intake somewha
t.
 Her spot urine for Pro/Cr was not done for unclear reasons?
 
 
 
 
 MEDS:
 
 Prograf 1 mg, BID
 Mycophenolate 250 mg, BID.
 Prednisone 5 mg, daily.
 Outpatient Medications Marked as Taking for the 19 encounter (Off-Site Visit) with Rosa Moser, DO 
 Medication Sig Dispense Refill 
   allopurinol (ZYLOPRIM) 100 mg tablet take 1 tablet by mouth once daily 30 tablet 11 
   aspirin 81 mg EC tablet Take 81 mg by mouth Daily.   
   B-D INS SYRINGE 0.5CC/31GX5/16 31G X 5/16" 0.5 ML MISC USE BEFORE MEALS AS DIRECTED 1 e
ach 11 
   cholecalciferol (VITAMIN D-3) 2000 UNITS TABS Take 5,000 Units by mouth Every other day
.   
   cinacalcet (SENSIPAR) 30 mg tablet take 1 tablet by mouth once daily 90 tablet 3 
   cyclobenzaprine (FLEXERIL) 10 mg tablet Take 1 tablet by mouth Twice  daily as needed f
or Muscle spasms. 45 tablet 0 
   fludrocortisone (FLORINEF) 0.1 mg tablet Take 1 tablet by mouth Every other day. 45 tab
let 3 
   furosemide (LASIX) 40 mg tablet Take 1 tablet by mouth Daily as needed. 30 tablet 11 
   glucose blood VI test strips (ONE TOUCH ULTRA TEST) strip Check blood sugar before each
 meal and as directed 100 each 12 
   insulin glargine (LANTUS) 100 units/mL injection (vial) inject 20 units subcutaneously 
every morning 10 vial 11 
   insulin lispro (HUMALOG) 100 units/mL injection (vial) inject subcutaneously BEFORE CHIKA
LS ACCORDING TO SLIDING SCALE Max dose 20 units daily 10 vial 11 
   levothyroxine (SYNTHROID) 50 mcg tablet take 1 tablet by mouth once daily 30 tablet 11 
   lisinopril (PRINIVIL,ZESTRIL) 30 MG tablet take 1 tablet by mouth once daily 90 tablet 
3 
   loperamide (ANTI-DIARRHEAL) 2 mg capsule Take 1 capsule by mouth 4 times daily as neede
d. 60 capsule 5 
   losartan (COZAAR) 50 mg tablet take 1 tablet by mouth once daily 90 tablet 3 
   magnesium oxide (MAG-OX) 400 mg tablet take 1 tablet by mouth twice a day 60 tablet 11 
   metoprolol tartrate (LOPRESSOR) 25 mg tablet take 1 tablet by mouth twice a day 60 tabl
et 11 
   mycophenolate (CELLCEPT) 250 mg capsule Take 1 capsule by mouth 2 times daily. 60 capsu
le 11 
   [DISCONTINUED] omeprazole (PRILOSEC) 20 mg capsule Take 20 mg by mouth every morning (b
efore breakfast).   
   Prenatal Vit-Fe Fumarate-FA (PNV PRENATAL PLUS MULTIVITAMIN) 27-1 MG TABS take 1 tablet
 by mouth once daily. 30 tablet 11 
   QVAR REDIHALER 80 MCG/ACT inhaler    
   Respiratory Therapy Supplies MISC Continue nocturnal O2 at 2 l/m through CPAP for lifet
bart. Dx: JACK G47.33 Please provide new nasal mask for CPAP and any other needed replacement 
supplies. 1 each 0 
   Respiratory Therapy Supplies MISC Decrease CPAP to 12-18 cmH2O Diagnosis Code(s)327.23.
 Please send order to [facility]. 1 each 0 
   rosuvastatin (CRESTOR) 20 mg tablet take 1 tablet by mouth NIGHTLY 30 tablet 11 
  
 
 No Known Allergies 
 
 Objective: /82  | Temp 36 C (96.8 F)  | Wt 116.1 kg (256 lb)  | BMI 41.32 kg/m 
  
 
 Physical Exam
 HEENT: No thrush.
 Heart:  Regular rate and rhythm with no S3, S4, murmur or rub.
 Lungs:  CTA bilaterally.  No rales or wheezes.
 Abdomen:  soft, obese, renal allograft in the RLQ is nontender,  NABS.
 
 Extremities: no edema,  clubbing, or cyanosis. No foot ulcers.
 
 Lab Results 
 Component Value Date 
  NAEX 144 2019 
  KEX 5.1 2019 
  CLEX 110 2019 
  CO2EX 22 2019 
  BUNEX 42 2019 
  CREEX 1.47 2019 
  EGFREX 35 2019 
  GLUEX 89 2019 
  PHOSEX 2.9 2019 
  MGEX 2.1 2019 
  PTHEX 193.0 (A) 2016 
  APE8PCR 6.4 2019 
 
 Lab Results 
 Component Value Date 
  CHOLEX 125 2019 
  HDLEX 51 2019 
  LDLEX 51 2019 
  TRIGEX 115 2019 
 
 Lab Results 
 Component Value Date 
  WBCEX 5.6 2019 
  HGBEX 13.9 2019 
  HCTEX 42.6 2019 
  PLTEX 176 2019 
 
 Lab Results 
 Component Value Date 
  TACROLIMUSEX 4.5 2019 
 
 Urine Pro/Cr ratio = 
 Lab Results 
 Component Value Date 
  PROTEX 0.250 (A) 2019 
 
 Assessment: 
 
 1.   Renal Allograft, 05 -- by her Hx and BUN:Cr ration, she may be volume contracted 
to explain the increased SCr?
 2.   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
 3.   Type 2 DM, requiring insuline-- excellent control.
 4.   Hyperlipidemia--stable on rosuvastatin.
 5.   Hypertension--  good control.
 6.   SHPTH-- needs repeat PTH?
 7.   Morbid Obesity/JACK/Pulmonary hypertension-- her weight is improved. Currently on O2, a
gree for her to continue. 
 8.   CAD, s/p CABG  3 vessels, --stable.
 9.   Type IV RTA--compensated.
 10.  Chronic Low Back pain--in remission.
 11.   chronic DJD, left knee--  same. 
 
 Plan: 
 
 1.  Will have her DC her lasix and increase her PO intake of salt and H2O.  Her decreased f
ood intake may have slowed her enterohepatic recirculation of the 
 
 tacrolimus some. However, I encouraged Viviana that her weight and HbA1c are clearly better.
 2.  Will increase her Prograf to 1.5 mg, AM and 1 mg, PM.
 3.  She will recheck the Prograf level and Urine Pro/Cr ration in one week, then, do her St
anding Order Q 3 mo.
 4.  Will plan to see her back in 4 months.  She will have her Standing Order, drug level, a
nd  Urine Pro/Cr ratio done one week prior to that, and again Q 3mo.
 
 Electronically signed by Marzena Moser DO. 19 16:22 
 
 CC:      Jose David Dalal M.D., [facility], [facility]
            Andres Wilson ARNPElectronically signed by Marzena Moser DO at 2019 
 2:19 PM PST
 Associated attestation - Marzena Moser DO - 2019  2:19 PM PSTRENAL ATTENDING
 I have participated in the care of this patient and I have reviewed and agree with all pert
inent clinical information above including history, exam, and recommendations.
 
 Electronically signed by: Marzena Moser DO, 2019 2:19 PM 
 documented in this encounter
 
 Plan of Treatment
 
 
+--------+-----------+------------+----------------------+-------------------+
| Date   | Type      | Specialty  | Care Team            | Description       |
+--------+-----------+------------+----------------------+-------------------+
| / | Office    | Cardiology |   Tonia Wilson,    |                   |
|    | Visit     |            | ARNP  [address]   |                   |
|        |           |            | [place], [place]  |                   |
|        |           |            | [address] [phone]  |                   |
|        |           |            |  [phone] (Fax)  |                   |
+--------+-----------+------------+----------------------+-------------------+
| / | Hospital  | Radiology  |   Popeye Townsend, |                   |
|    | Encounter |            |  MD  [address] |                   |
|        |           |            |  St.  [place],   |                   |
|        |           |            | [place] [address]             |                   |
|        |           |            | [phone]         |                   |
|        |           |            | [phone] (Fax)   |                   |
+--------+-----------+------------+----------------------+-------------------+
| / | Surgery   | Radiology  |   Popeye Townsend, | CV EP PPM SYSTEM  |
|    |           |            |  MD  [address] | IMPLANT           |
|        |           |            |  St.  [place],   |                   |
|        |           |            | [place] [address]             |                   |
|        |           |            | [phone]         |                   |
|        |           |            | [phone] (Fax)   |                   |
+--------+-----------+------------+----------------------+-------------------+
| 02/10/ | Clinical  | Cardiology |                      |                   |
|    | Support   |            |                      |                   |
+--------+-----------+------------+----------------------+-------------------+
| / | Office    | Cardiology |   Tonia Wilson,    |                   |
|    | Visit     |            | ARNP  [address]   |                   |
|        |           |            | St  [place], [place]  |                   |
|        |           |            | [address]  [phone]  |                   |
|        |           |            |  [phone] (Fax)  |                   |
+--------+-----------+------------+----------------------+-------------------+
| / | Off-Site  | Nephrology |   Marzena Moser  |                   |
|    | Visit     |            | DO [facility]  [address]      |                   |
|        |           |            | [address]      |                   |
|        |           |            | [place]      |                   |
|        |           |            | [address]  [phone]  |                   |
 
|        |           |            |  [phone] (Fax)  |                   |
+--------+-----------+------------+----------------------+-------------------+
 
 
 
+---------------------+------+--------+----------------------+----------------------+
| Name                | Type | Priori | Associated Diagnoses | Order Schedule       |
|                     |      | ty     |                      |                      |
+---------------------+------+--------+----------------------+----------------------+
| Comprehensive       | Lab  | Routin |   Kidney replaced by | Every 4 weeks for 22 |
| Metabolic Panel     |      | e      |  transplant  Type 2  |  Occurrences         |
|                     |      |        | diabetes mellitus    | starting 2019  |
|                     |      |        | with chronic kidney  | until 2020     |
|                     |      |        | disease, without     |                      |
|                     |      |        | long-term current    |                      |
|                     |      |        | use of insulin,      |                      |
|                     |      |        | unspecified CKD      |                      |
|                     |      |        | stage (HCC)  FSGS    |                      |
|                     |      |        | (focal segmental     |                      |
|                     |      |        | glomerulosclerosis)  |                      |
|                     |      |        |  Hypertension,       |                      |
|                     |      |        | essential            |                      |
+---------------------+------+--------+----------------------+----------------------+
| Phosphorus          | Lab  | Routin |   Kidney replaced by | Every 4 weeks for 22 |
|                     |      | e      |  transplant  Type 2  |  Occurrences         |
|                     |      |        | diabetes mellitus    | starting 2019  |
|                     |      |        | with chronic kidney  | until 2020     |
|                     |      |        | disease, without     |                      |
|                     |      |        | long-term current    |                      |
|                     |      |        | use of insulin,      |                      |
|                     |      |        | unspecified CKD      |                      |
|                     |      |        | stage (HCC)  FSGS    |                      |
|                     |      |        | (focal segmental     |                      |
|                     |      |        | glomerulosclerosis)  |                      |
|                     |      |        |  Hypertension,       |                      |
|                     |      |        | essential            |                      |
+---------------------+------+--------+----------------------+----------------------+
| Magnesium           | Lab  | Routin |   Kidney replaced by | Every 4 weeks for 22 |
|                     |      | e      |  transplant  Type 2  |  Occurrences         |
|                     |      |        | diabetes mellitus    | starting 2019  |
|                     |      |        | with chronic kidney  | until 2020     |
|                     |      |        | disease, without     |                      |
|                     |      |        | long-term current    |                      |
|                     |      |        | use of insulin,      |                      |
|                     |      |        | unspecified CKD      |                      |
|                     |      |        | stage (HCC)  FSGS    |                      |
|                     |      |        | (focal segmental     |                      |
|                     |      |        | glomerulosclerosis)  |                      |
|                     |      |        |  Hypertension,       |                      |
|                     |      |        | essential            |                      |
+---------------------+------+--------+----------------------+----------------------+
| Protein/Creatinine  | Lab  | Routin |   Kidney replaced by | Every 4 weeks for 22 |
| Ratio, Urine        |      | e      |  transplant  Type 2  |  Occurrences         |
|                     |      |        | diabetes mellitus    | starting 2019  |
|                     |      |        | with chronic kidney  | until 2020     |
|                     |      |        | disease, without     |                      |
|                     |      |        | long-term current    |                      |
|                     |      |        | use of insulin,      |                      |
|                     |      |        | unspecified CKD      |                      |
|                     |      |        | stage (HCC)  FSGS    |                      |
 
|                     |      |        | (focal segmental     |                      |
|                     |      |        | glomerulosclerosis)  |                      |
|                     |      |        |  Hypertension,       |                      |
|                     |      |        | essential            |                      |
+---------------------+------+--------+----------------------+----------------------+
| Tacrolimus Trough,  | Lab  | Routin |   Kidney replaced by | Weekly for 52        |
| LC-MS/MS            |      | e      |  transplant  Type 2  | Occurrences starting |
|                     |      |        | diabetes mellitus    |  2019 until    |
|                     |      |        | with chronic kidney  | 2020           |
|                     |      |        | disease, without     |                      |
|                     |      |        | long-term current    |                      |
|                     |      |        | use of insulin,      |                      |
|                     |      |        | unspecified CKD      |                      |
|                     |      |        | stage (HCC)  FSGS    |                      |
|                     |      |        | (focal segmental     |                      |
|                     |      |        | glomerulosclerosis)  |                      |
|                     |      |        |  Hypertension,       |                      |
|                     |      |        | essential            |                      |
+---------------------+------+--------+----------------------+----------------------+
 documented as of this encounter
 
 Procedures
 
 
+----------------------+--------+-------------+----------------------+----------------------
+
| Procedure Name       | Priori | Date/Time   | Associated Diagnosis | Comments             
|
|                      | ty     |             |                      |                      
|
+----------------------+--------+-------------+----------------------+----------------------
+
| LABS - EXTERNAL SCAN |        | 2019  |                      |   Results for this   
|
|                      |        | 12:00 AM    |                      | procedure are in the 
|
|                      |        | PDT         |                      |  results section.    
|
+----------------------+--------+-------------+----------------------+----------------------
+
| LABS - EXTERNAL SCAN |        | 2019  |                      |   Results for this   
|
|                      |        | 12:00 AM    |                      | procedure are in the 
|
|                      |        | PDT         |                      |  results section.    
|
+----------------------+--------+-------------+----------------------+----------------------
+
| EXTERNAL LAB:        | Routin | 2019  |                      |   Results for this   
|
| HEMOGLOBIN A1C       | e      |             |                      | procedure are in the 
|
|                      |        |             |                      |  results section.    
|
+----------------------+--------+-------------+----------------------+----------------------
+
| EXTERNAL LAB:        | Routin | 2019  |                      |   Results for this   
|
| TACROLIMUS LEVEL,    | e      |             |                      | procedure are in the 
|
 
| LC-MS/MS             |        |             |                      |  results section.    
|
+----------------------+--------+-------------+----------------------+----------------------
+
 documented in this encounter
 
 Results
 LABS - EXTERNAL SCAN (2019 12:00 AM PDT)
 
+------------------------------------------------------------------------+--------------+
| Narrative                                                              | Performed At |
+------------------------------------------------------------------------+--------------+
|   This result has an attachment that is not available.  Ordered by an  |              |
| unspecified provider.                                                  |              |
+------------------------------------------------------------------------+--------------+
 LABS - EXTERNAL SCAN (2019 12:00 AM PDT)
 
+------------------------------------------------------------------------+--------------+
| Narrative                                                              | Performed At |
+------------------------------------------------------------------------+--------------+
|   This result has an attachment that is not available.  Ordered by an  |              |
| unspecified provider.                                                  |              |
+------------------------------------------------------------------------+--------------+
 External Lab: Hemoglobin A1c (2019)
 
+-------------+-------+-----------+------------+--------------+
| Component   | Value | Ref Range | Performed  | Pathologist  |
|             |       |           | At         | Signature    |
+-------------+-------+-----------+------------+--------------+
| Hemoglobin  | 6.4   | %         |            |              |
| A1c,        |       |           |            |              |
| external    |       |           |            |              |
+-------------+-------+-----------+------------+--------------+
 
 
 
+----------+
| Specimen |
+----------+
| Blood    |
+----------+
 External Lab: Tacrolimus Level, LC-MS/MS (2019)
 
+-------------+-------+-----------+------------+--------------+
| Component   | Value | Ref Range | Performed  | Pathologist  |
|             |       |           | At         | Signature    |
+-------------+-------+-----------+------------+--------------+
| Tacrolimus, | 4.5   | ng/ml     |            |              |
|  LC-MS/MS,  |       |           |            |              |
| External    |       |           |            |              |
+-------------+-------+-----------+------------+--------------+
 
 
 
+----------+
| Specimen |
+----------+
|          |
+----------+
 documented in this encounter
 
 
 Visit Diagnoses
 
 
+------------------------------------------------------------------------------------------+
| Diagnosis                                                                                |
+------------------------------------------------------------------------------------------+
|   Kidney replaced by transplant - Primary                                                |
+------------------------------------------------------------------------------------------+
|   Type 2 diabetes mellitus with chronic kidney disease, without long-term current use of |
|  insulin, unspecified CKD stage (HCC)                                                    |
+------------------------------------------------------------------------------------------+
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
| membranous glomerulonephritis                                                            |
+------------------------------------------------------------------------------------------+
|   Hypertension, essential  Unspecified essential hypertension                            |
+------------------------------------------------------------------------------------------+
 documented in this encounter

## 2020-01-08 NOTE — XMS
Encounter Summary
  Created on: 2020
 
 Viviana Ricci
 External Reference #: [id number]
 : 47
 Sex: Female
 
 Demographics
 
 
+-----------------------+----------------------+
| Address               | [address]      |
|                       | [place]  [address] |
+-----------------------+----------------------+
| Home Phone            | [phone]      |
+-----------------------+----------------------+
| Preferred Language    | Unknown              |
+-----------------------+----------------------+
| Marital Status        | Single               |
+-----------------------+----------------------+
| [employer] Affiliation | [id number]                 |
+-----------------------+----------------------+
| Race                  | Unknown              |
+-----------------------+----------------------+
| Ethnic Group          | Unknown              |
+-----------------------+----------------------+
 
 
 Author
 
 
+--------------+--------------------------------------------+
| Author       | [facility]  |
|              | and [facility]                                |
+--------------+--------------------------------------------+
| Organization | [facility]  |
|              | and [facility]                                |
+--------------+--------------------------------------------+
| Address      | Unknown                                    |
+--------------+--------------------------------------------+
| Phone        | Unavailable                                |
+--------------+--------------------------------------------+
 
 
 
 Support
 
 
+----------------+--------------+---------------------+-----------------+
| Name           | Relationship | Address             | Phone           |
+----------------+--------------+---------------------+-----------------+
| Ada/Ed Radhames | [employer]         | [address]              | [phone] |
|                |              | [place]  |                 |
|                |              |  [id number]              |                 |
+----------------+--------------+---------------------+-----------------+
 
 
 
 
 Care Team Providers
 
 
+-----------------------+------+-------------+
| Care Team Member Name | Role | Phone       |
+-----------------------+------+-------------+
 PCP  | Unavailable |
+-----------------------+------+-------------+
 
 
 
 Reason for Visit
 
 
+--------+----------+
| Reason | Comments |
+--------+----------+
| Other  |          |
+--------+----------+
 
 
 
 Encounter Details
 
 
+--------+-----------+----------------------+----------------------+-------------+
| Date   | Type      | Department           | Care Team            | Description |
+--------+-----------+----------------------+----------------------+-------------+
| / | Telephone |   [facility]          |   Maricruz Flanagan, | Other       |
|    |           | PULMONARY  [address]     |  RN                  |             |
|        |           | [address]  [place], |                      |             |
|        |           |  [place] [address]       |                      |             |
|        |           | [phone]         |                      |             |
+--------+-----------+----------------------+----------------------+-------------+
 
 
 
 Social History
 
 
+--------------+-------+-----------+--------+------+
| Tobacco Use  | Types | Packs/Day | Years  | Date |
|              |       |           | Used   |      |
+--------------+-------+-----------+--------+------+
| Never Smoker |       |           |        |      |
+--------------+-------+-----------+--------+------+
 
 
 
+---------------------+---+---+---+
| Smokeless Tobacco:  |   |   |   |
| Never Used          |   |   |   |
+---------------------+---+---+---+
 
 
 
+---------------------------------------------------------------+
| Comments: some second hand smoke exposure, but fairly minimal |
+---------------------------------------------------------------+
 
 
 
 
+-------------+-------------+---------+----------+
| Alcohol Use | Drinks/Week | oz/Week | Comments |
+-------------+-------------+---------+----------+
| No          |             |         |          |
+-------------+-------------+---------+----------+
 
 
 
+------------------+---------------+
| Sex Assigned at  | Date Recorded |
| Birth            |               |
+------------------+---------------+
| Not on file      |               |
+------------------+---------------+
 
 
 
+----------------+-------------+-------------+
| Job Start Date | Occupation  | Industry    |
+----------------+-------------+-------------+
| Not on file    | Not on file | Not on file |
+----------------+-------------+-------------+
 
 
 
+----------------+--------------+------------+
| Travel History | Travel Start | Travel End |
+----------------+--------------+------------+
 
 
 
+-------------------------------------+
| No recent travel history available. |
+-------------------------------------+
 documented as of this encounter
 
 Plan of Treatment
 
 
+--------+-----------+------------+----------------------+-------------------+
| Date   | Type      | Specialty  | Care Team            | Description       |
+--------+-----------+------------+----------------------+-------------------+
| / | Office    | Cardiology |   Tonia Wilson,    |                   |
|    | Visit     |            | ARNP  [address]   |                   |
|        |           |            | St  [place] [facility], [place]  |                   |
|        |           |            | [address]  [phone]  |                   |
|        |           |            |  [phone] (Fax)  |                   |
+--------+-----------+------------+----------------------+-------------------+
| / | Hospital  | Radiology  |   Popeye Townsend, |                   |
|    | Encounter |            |  MD  [address] |                   |
|        |           |            |  St.  [place],   |                   |
|        |           |            | [place] [address]             |                   |
|        |           |            | [phone]         |                   |
|        |           |            | [phone] (Fax)   |                   |
+--------+-----------+------------+----------------------+-------------------+
| / | Surgery   | Radiology  |   Popeye Townsend, | CV EP PPM SYSTEM  |
|    |           |            |  MD  [address] | IMPLANT           |
 
|        |           |            |  St.  [place],   |                   |
|        |           |            | [place] [address]             |                   |
|        |           |            | [phone]         |                   |
|        |           |            | [phone] (Fax)   |                   |
+--------+-----------+------------+----------------------+-------------------+
| 02/10/ | Clinical  | Cardiology |                      |                   |
|    | Support   |            |                      |                   |
+--------+-----------+------------+----------------------+-------------------+
| / | Office    | Cardiology |   Hellberg, Tonia,    |                   |
|    | Visit     |            | [facility]  [address]   |                   |
|        |           |            | [place]  |                   |
|        |           |            | [address]  [phone]  |                   |
|        |           |            |  [phone] (Fax)  |                   |
+--------+-----------+------------+----------------------+-------------------+
| / | Off-Site  | Nephrology |   Marzena Moser  |                   |
|    | Visit     |            | DO ETIENNE  [address] [place] [address]      |                   |
|        |           |            | [address]      |                   |
|        |           |            | [place]      |                   |
|        |           |            | [address] [phone]  |                   |
|        |           |            |  [phone] (Fax)  |                   |
+--------+-----------+------------+----------------------+-------------------+
 documented as of this encounter
 
 Visit Diagnoses
 Not on filedocumented in this encounter

## 2020-01-08 NOTE — XMS
Encounter Summary
  Created on: 2020
 
 Viviana Ricci
 External Reference #: [id number]
 : 47
 Sex: Female
 
 Demographics
 
 
+-----------------------+----------------------+
| Address               | [address]      |
|                       | [place]  [address] |
+-----------------------+----------------------+
| Home Phone            | [phone]      |
+-----------------------+----------------------+
| Preferred Language    | Unknown              |
+-----------------------+----------------------+
| Marital Status        | Single               |
+-----------------------+----------------------+
| Yazidism Affiliation | [id number]                 |
+-----------------------+----------------------+
| Race                  | Unknown              |
+-----------------------+----------------------+
| Ethnic Group          | Unknown              |
+-----------------------+----------------------+
 
 
 Author
 
 
+--------------+--------------------------------------------+
| Author       | [facility]  |
|              | and [place]                                |
+--------------+--------------------------------------------+
| Organization | [facility]  |
|              | and [place]                                |
+--------------+--------------------------------------------+
| Address      | Unknown                                    |
+--------------+--------------------------------------------+
| Phone        | Unavailable                                |
+--------------+--------------------------------------------+
 
 
 
 Support
 
 
+----------------+--------------+---------------------+-----------------+
| Name           | Relationship | Address             | Phone           |
+----------------+--------------+---------------------+-----------------+
| Ada/Ed Radhames | ECON         | [address]              | [phone] |
|                |              | [place]  |                 |
|                |              |  [address]              |                 |
+----------------+--------------+---------------------+-----------------+
 
 
 
 
 Care Team Providers
 
 
+------------------------+------+-----------------+
| Care Team Member Name  | Role | Phone           |
+------------------------+------+-----------------+
| Marzena Moser DO | PCP  | [phone] |
+------------------------+------+-----------------+
 
 
 
 Reason for Visit
 
 
+-------------------+----------+
| Reason            | Comments |
+-------------------+----------+
| Medication Refill |          |
+-------------------+----------+
 
 
 
 Encounter Details
 
 
+--------+--------+---------------------+----------------------+-------------------+
| Date   | Type   | Department          | Care Team            | Description       |
+--------+--------+---------------------+----------------------+-------------------+
| / | Refill |   [facility]         |   Marzena Moser  | Medication Refill |
| 2019   |        | NEPHROLOGY  [address]   | M, DO  [address]      |                   |
|        |        | [address]   | [address]      |                   |
|        |        | [place], [place]     | [place], [place]      |                   |
|        |        | [address]          | [address]  [phone]  |                   |
|        |        | [phone]        |  [phone] (Fax)  |                   |
+--------+--------+---------------------+----------------------+-------------------+
 
 
 
 Social History
 
 
+--------------+-------+-----------+--------+------+
| Tobacco Use  | Types | Packs/Day | Years  | Date |
|              |       |           | Used   |      |
+--------------+-------+-----------+--------+------+
| Never Smoker |       |           |        |      |
+--------------+-------+-----------+--------+------+
 
 
 
+---------------------+---+---+---+
| Smokeless Tobacco:  |   |   |   |
| Never Used          |   |   |   |
+---------------------+---+---+---+
 
 
 
+---------------------------------------------------------------+
| Comments: some second hand smoke exposure, but fairly minimal |
 
+---------------------------------------------------------------+
 
 
 
+-------------+-------------+---------+----------+
| Alcohol Use | Drinks/Week | oz/Week | Comments |
+-------------+-------------+---------+----------+
| No          |             |         |          |
+-------------+-------------+---------+----------+
 
 
 
+------------------+---------------+
| Sex Assigned at  | Date Recorded |
| Birth            |               |
+------------------+---------------+
| Not on file      |               |
+------------------+---------------+
 
 
 
+----------------+-------------+-------------+
| Job Start Date | Occupation  | Industry    |
+----------------+-------------+-------------+
| Not on file    | Not on file | Not on file |
+----------------+-------------+-------------+
 
 
 
+----------------+--------------+------------+
| Travel History | Travel Start | Travel End |
+----------------+--------------+------------+
 
 
 
+-------------------------------------+
| No recent travel history available. |
+-------------------------------------+
 documented as of this encounter
 
 Plan of Treatment
 
 
+--------+-----------+------------+----------------------+-------------------+
| Date   | Type      | Specialty  | Care Team            | Description       |
+--------+-----------+------------+----------------------+-------------------+
| / | Office    | Cardiology |   Tonia Wilson,    |                   |
|    | Visit     |            | ARNP  [address]   |                   |
|        |           |            | St [place], [place]  |                   |
|        |           |            | [address]  [phone]  |                   |
|        |           |            |  [phone] (Fax)  |                   |
+--------+-----------+------------+----------------------+-------------------+
| / | Hospital  | Radiology  |   Popeye Townsend, |                   |
|    | Encounter |            |  MD  [address] |                   |
|        |           |            |  [place],   |                   |
|        |           |            | [place] [address]             |                   |
|        |           |            | [phone]         |                   |
|        |           |            | [phone] (Fax)   |                   |
+--------+-----------+------------+----------------------+-------------------+
| / | Surgery   | Radiology  |   Popeye Townsend, | CV EP PPM SYSTEM  |
 
|    |           |            |  MD  [address] | IMPLANT           |
|        |           |            |  [place],   |                   |
|        |           |            | [place] [address]             |                   |
|        |           |            | [phone]         |                   |
|        |           |            | [phone] (Fax)   |                   |
+--------+-----------+------------+----------------------+-------------------+
| 02/10/ | Clinical  | Cardiology |                      |                   |
|    | Support   |            |                      |                   |
+--------+-----------+------------+----------------------+-------------------+
| / | Office    | Cardiology |   Tonia Wilson,    |                   |
|    | Visit     |            | ARNP  [address]   |                   |
|        |           |            | [place]  |                   |
|        |           |            | [id number] [phone]  |                   |
|        |           |            |  [phone] (Fax)  |                   |
+--------+-----------+------------+----------------------+-------------------+
| / | Off-Site  | Nephrology |   Marzena Moser  |                   |
|    | Visit     |            | DO ETIENNE  [address] [place] [address]      |                   |
|        |           |            | [address]      |                   |
|        |           |            | [place]      |                   |
|        |           |            | [id number] [phone]  |                   |
|        |           |            |  [phone] (Fax)  |                   |
+--------+-----------+------------+----------------------+-------------------+
 documented as of this encounter
 
 Visit Diagnoses
 
 
+---------------------------------+
| Diagnosis                       |
+---------------------------------+
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
+---------------------------------+
 documented in this encounter

## 2020-01-08 NOTE — XMS
Encounter Summary
  Created on: 2020
 
 Viviana Ricci
 External Reference #: [id number]
 : 47
 Sex: Female
 
 Demographics
 
 
+-----------------------+----------------------+
| Address               | [address]      |
|                       | [place]  [address] |
+-----------------------+----------------------+
| Home Phone            | [phone]      |
+-----------------------+----------------------+
| Preferred Language    | Unknown              |
+-----------------------+----------------------+
| Marital Status        | Single               |
+-----------------------+----------------------+
| Denominational Affiliation | [id number]                 |
+-----------------------+----------------------+
| Race                  | Unknown              |
+-----------------------+----------------------+
| Ethnic Group          | Unknown              |
+-----------------------+----------------------+
 
 
 Author
 
 
+--------------+--------------------------------------------+
| Author       | [facility]  |
|              | and [place]                                |
+--------------+--------------------------------------------+
| Organization | [facility]  |
|              | and [place]                                |
+--------------+--------------------------------------------+
| Address      | Unknown                                    |
+--------------+--------------------------------------------+
| Phone        | Unavailable                                |
+--------------+--------------------------------------------+
 
 
 
 Support
 
 
+----------------+--------------+---------------------+-----------------+
| Name           | Relationship | Address             | Phone           |
+----------------+--------------+---------------------+-----------------+
| Ada/Ed Radhames | ECON         | [address]              | [phone] |
|                |              | [place]  |                 |
|                |              |  [address]              |                 |
+----------------+--------------+---------------------+-----------------+
 
 
 
 
 Care Team Providers
 
 
+-----------------------+------+-------------+
| Care Team Member Name | Role | Phone       |
+-----------------------+------+-------------+
 PCP  | Unavailable |
+-----------------------+------+-------------+
 
 
 
 Reason for Visit
 
 
+--------+----------+
| Reason | Comments |
+--------+----------+
| Other  |          |
+--------+----------+
 
 
 
 Encounter Details
 
 
+--------+-----------+----------------------+----------------------+-------------+
| Date   | Type      | Department           | Care Team            | Description |
+--------+-----------+----------------------+----------------------+-------------+
| / | Telephone |   [facility] [place]          |   Maricruz Flanagan, | Other       |
|    |           | PULMONARY  [address]     |  RN                  |             |
|        |           | [address]  [place], |                      |             |
|        |           |  [place] [address]       |                      |             |
|        |           | [phone]         |                      |             |
+--------+-----------+----------------------+----------------------+-------------+
 
 
 
 Social History
 
 
+--------------+-------+-----------+--------+------+
| Tobacco Use  | Types | Packs/Day | Years  | Date |
|              |       |           | Used   |      |
+--------------+-------+-----------+--------+------+
| Never Smoker |       |           |        |      |
+--------------+-------+-----------+--------+------+
 
 
 
+---------------------+---+---+---+
| Smokeless Tobacco:  |   |   |   |
| Never Used          |   |   |   |
+---------------------+---+---+---+
 
 
 
+---------------------------------------------------------------+
| Comments: some second hand smoke exposure, but fairly minimal |
+---------------------------------------------------------------+
 
 
 
 
+-------------+-------------+---------+----------+
| Alcohol Use | Drinks/Week | oz/Week | Comments |
+-------------+-------------+---------+----------+
| No          |             |         |          |
+-------------+-------------+---------+----------+
 
 
 
+------------------+---------------+
| Sex Assigned at  | Date Recorded |
| Birth            |               |
+------------------+---------------+
| Not on file      |               |
+------------------+---------------+
 
 
 
+----------------+-------------+-------------+
| Job Start Date | Occupation  | Industry    |
+----------------+-------------+-------------+
| Not on file    | Not on file | Not on file |
+----------------+-------------+-------------+
 
 
 
+----------------+--------------+------------+
| Travel History | Travel Start | Travel End |
+----------------+--------------+------------+
 
 
 
+-------------------------------------+
| No recent travel history available. |
+-------------------------------------+
 documented as of this encounter
 
 Plan of Treatment
 
 
+--------+-----------+------------+----------------------+-------------------+
| Date   | Type      | Specialty  | Care Team            | Description       |
+--------+-----------+------------+----------------------+-------------------+
| / | Office    | Cardiology |   Tonia Wilson,    |                   |
|    | Visit     |            | ARNP  [address]   |                   |
|        |           |            | St  [place] [facility], [place]  |                   |
|        |           |            | [address]  [phone]  |                   |
|        |           |            |  [phone] (Fax)  |                   |
+--------+-----------+------------+----------------------+-------------------+
| / | Hospital  | Radiology  |   Popeye Townsend, |                   |
|    | Encounter |            |  MD  [address] |                   |
|        |           |            |  St.  [place],   |                   |
|        |           |            | [place] [address]             |                   |
|        |           |            | [phone]         |                   |
|        |           |            | [phone] (Fax)   |                   |
+--------+-----------+------------+----------------------+-------------------+
| / | Surgery   | Radiology  |   Popeye Townsend, | CV EP PPM SYSTEM  |
|    |           |            |  MD  [address] | IMPLANT           |
 
|        |           |            |  St.  [place],   |                   |
|        |           |            | [place] [address]             |                   |
|        |           |            | [phone]         |                   |
|        |           |            | [phone] (Fax)   |                   |
+--------+-----------+------------+----------------------+-------------------+
| 02/10/ | Clinical  | Cardiology |                      |                   |
|    | Support   |            |                      |                   |
+--------+-----------+------------+----------------------+-------------------+
| / | Office    | Cardiology |   Hellberg, Tonia,    |                   |
|    | Visit     |            | [facility]  [address]   |                   |
|        |           |            | [place]  |                   |
|        |           |            | [address]  [phone]  |                   |
|        |           |            |  [phone] (Fax)  |                   |
+--------+-----------+------------+----------------------+-------------------+
| / | Off-Site  | Nephrology |   Marzena Moser  |                   |
|    | Visit     |            | DO ETIENNE  [address] [place] [address]      |                   |
|        |           |            | [address]      |                   |
|        |           |            | [place]      |                   |
|        |           |            | [address] [phone]  |                   |
|        |           |            |  [phone] (Fax)  |                   |
+--------+-----------+------------+----------------------+-------------------+
 documented as of this encounter
 
 Visit Diagnoses
 Not on filedocumented in this encounter

## 2020-01-08 NOTE — XMS
Encounter Summary
  Created on: 2020
 
 Viviana Ricci
 External Reference #: [id number]
 : 47
 Sex: Female
 
 Demographics
 
 
+-----------------------+----------------------+
| Address               | [address]      |
|                       | [place]  [address] |
+-----------------------+----------------------+
| Home Phone            | [phone]      |
+-----------------------+----------------------+
| Preferred Language    | Unknown              |
+-----------------------+----------------------+
| Marital Status        | Single               |
+-----------------------+----------------------+
| Latter-day Affiliation | 1009                 |
+-----------------------+----------------------+
| Race                  | Unknown              |
+-----------------------+----------------------+
| Ethnic Group          | Unknown              |
+-----------------------+----------------------+
 
 
 Author
 
 
+--------------+--------------------------------------------+
| Author       | [facility]  |
|              | and [place]                                |
+--------------+--------------------------------------------+
| Organization | [facility]  |
|              | and [place]                                |
+--------------+--------------------------------------------+
| Address      | Unknown                                    |
+--------------+--------------------------------------------+
| Phone        | Unavailable                                |
+--------------+--------------------------------------------+
 
 
 
 Support
 
 
+----------------+--------------+---------------------+-----------------+
| Name           | Relationship | Address             | Phone           |
+----------------+--------------+---------------------+-----------------+
| Ada/Ed Radhames | ECON         | [address]              | [phone] |
|                |              | [place]  |                 |
|                |              |  [address]              |                 |
+----------------+--------------+---------------------+-----------------+
 
 
 
 
 Care Team Providers
 
 
+------------------------+------+-----------------+
| Care Team Member Name  | Role | Phone           |
+------------------------+------+-----------------+
| Marzena Moser DO | PCP  | [phone] |
+------------------------+------+-----------------+
 
 
 
 Encounter Details
 
 
+--------+----------+---------------------+----------------------+-------------+
| Date   | Type     | Department          | Care Team            | Description |
+--------+----------+---------------------+----------------------+-------------+
| / | Abstract |   [facility]         |   Marzena Moser  |             |
| 2018   |          | NEPHROLOGY  [address]   | DO ETIENNE  [address]      |             |
|        |          | [address]   | [address]      |             |
|        |          | [place], [place]     | [place], [place]      |             |
|        |          | [address]          | [address]  [phone]  |             |
|        |          | [phone]        |  [phone] (Fax)  |             |
+--------+----------+---------------------+----------------------+-------------+
 
 
 
 Social History
 
 
+--------------+-------+-----------+--------+------+
| Tobacco Use  | Types | Packs/Day | Years  | Date |
|              |       |           | Used   |      |
+--------------+-------+-----------+--------+------+
| Never Smoker |       |           |        |      |
+--------------+-------+-----------+--------+------+
 
 
 
+---------------------+---+---+---+
| Smokeless Tobacco:  |   |   |   |
| Never Used          |   |   |   |
+---------------------+---+---+---+
 
 
 
+---------------------------------------------------------------+
| Comments: some second hand smoke exposure, but fairly minimal |
+---------------------------------------------------------------+
 
 
 
+-------------+-------------+---------+----------+
| Alcohol Use | Drinks/Week | oz/Week | Comments |
+-------------+-------------+---------+----------+
| No          |             |         |          |
+-------------+-------------+---------+----------+
 
 
 
 
+------------------+---------------+
| Sex Assigned at  | Date Recorded |
| Birth            |               |
+------------------+---------------+
| Not on file      |               |
+------------------+---------------+
 
 
 
+----------------+-------------+-------------+
| Job Start Date | Occupation  | Industry    |
+----------------+-------------+-------------+
| Not on file    | Not on file | Not on file |
+----------------+-------------+-------------+
 
 
 
+----------------+--------------+------------+
| Travel History | Travel Start | Travel End |
+----------------+--------------+------------+
 
 
 
+-------------------------------------+
| No recent travel history available. |
+-------------------------------------+
 documented as of this encounter
 
 Plan of Treatment
 
 
+--------+-----------+------------+----------------------+-------------------+
| Date   | Type      | Specialty  | Care Team            | Description       |
+--------+-----------+------------+----------------------+-------------------+
| / | Office    | Cardiology |   Tonia Wilson,    |                   |
|    | Visit     |            | ARNP  [address]   |                   |
|        |           |            | St  [place] [facility] [place]  |                   |
|        |           |            | [address]  [phone]  |                   |
|        |           |            |  [phone] (Fax)  |                   |
+--------+-----------+------------+----------------------+-------------------+
| / | Hospital  | Radiology  |   Popeye Townsend, |                   |
|    | Encounter |            |  MD  [address] |                   |
|        |           |            |  St.  [place],   |                   |
|        |           |            | [place] [address]             |                   |
|        |           |            | [phone]         |                   |
|        |           |            | [phone] (Fax)   |                   |
+--------+-----------+------------+----------------------+-------------------+
| / | Surgery   | Radiology  |   Popeye Townsend, | CV EP PPM SYSTEM  |
|    |           |            |  MD  [address] | IMPLANT           |
|        |           |            |  St.  [place],   |                   |
|        |           |            | [place] [address]             |                   |
|        |           |            | [phone]         |                   |
|        |           |            | [phone] (Fax)   |                   |
+--------+-----------+------------+----------------------+-------------------+
| 02/10/ | Clinical  | Cardiology |                      |                   |
2020   | Support   |            |                      |                   |
+--------+-----------+------------+----------------------+-------------------+
| / | Office    | Cardiology |   Tonia Wilson,    |                   |
|    | Visit     |            | [facility]  [address]   |                   |
 
|        |           |            |   [place]  |                   |
|        |           |            | [address]  [phone]  |                   |
|        |           |            |  [phone] (Fax)  |                   |
+--------+-----------+------------+----------------------+-------------------+
| / | Off-Site  | Nephrology |   Marzena Moser  |                   |
2020   | Visit     |            | DO ETIENNE  [address]      |                   |
|        |           |            | [address]      |                   |
|        |           |            | [place]      |                   |
|        |           |            | [address]  [phone]  |                   |
|        |           |            |  [phone] (Fax)  |                   |
+--------+-----------+------------+----------------------+-------------------+
 documented as of this encounter
 
 Procedures
 
 
+--------------------+--------+------------+----------------------+----------------------+
| Procedure Name     | Priori | Date/Time  | Associated Diagnosis | Comments             |
|                    | ty     |            |                      |                      |
+--------------------+--------+------------+----------------------+----------------------+
| TACROLIMUS TROUGH  | Routin | 2018 |                      |   Results for this   |
| LC-MS/MS           | e      |            |                      | procedure are in the |
|                    |        |            |                      |  results section.    |
+--------------------+--------+------------+----------------------+----------------------+
 documented in this encounter
 
 Results
 Tacrolimus Trough, LC-MS/MS (2018)
 
+-------------+-------+-----------+------------+--------------+
| Component   | Value | Ref Range | Performed  | Pathologist  |
|             |       |           | At         | Signature    |
+-------------+-------+-----------+------------+--------------+
| Tacrolimus, | 3.3   |           |            |              |
|  LC-MS/MS,  |       |           |            |              |
| External    |       |           |            |              |
+-------------+-------+-----------+------------+--------------+
 
 
 
+----------+
| Specimen |
+----------+
| Blood    |
+----------+
 documented in this encounter
 
 Visit Diagnoses
 Not on filedocumented in this encounter

## 2020-01-08 NOTE — XMS
Encounter Summary
  Created on: 2020
 
 Viviana Ricci
 External Reference #: [id number]
 : 47
 Sex: Female
 
 Demographics
 
 
+-----------------------+----------------------+
| Address               | [address]      |
|                       | [place]  [address] |
+-----------------------+----------------------+
| Home Phone            | [phone]      |
+-----------------------+----------------------+
| Preferred Language    | Unknown              |
+-----------------------+----------------------+
| Marital Status        | Single               |
+-----------------------+----------------------+
| [employer] | 1009                 |
+-----------------------+----------------------+
| Race                  | Unknown              |
+-----------------------+----------------------+
| Ethnic Group          | Unknown              |
+-----------------------+----------------------+
 
 
 Author
 
 
+--------------+--------------------------------------------+
| Author       | [facility]  |
|              | and [place]                                |
+--------------+--------------------------------------------+
| Organization | [facility]  |
|              | and [place]                                |
+--------------+--------------------------------------------+
| Address      | Unknown                                    |
+--------------+--------------------------------------------+
| Phone        | Unavailable                                |
+--------------+--------------------------------------------+
 
 
 
 Support
 
 
+----------------+--------------+---------------------+-----------------+
| Name           | Relationship | Address             | Phone           |
+----------------+--------------+---------------------+-----------------+
| Ada/Ed Radhames | ECON         | [address]              | [phone] |
|                |              | [place]  |                 |
|                |              |  [address]              |                 |
+----------------+--------------+---------------------+-----------------+
 
 
 
 
 Care Team Providers
 
 
+------------------------+------+-----------------+
| Care Team Member Name  | Role | Phone           |
+------------------------+------+-----------------+
| Marzena Moser DO | PCP  | [phone] |
+------------------------+------+-----------------+
 
 
 
 Reason for Visit
 
 
+-------------------+----------+
| Reason            | Comments |
+-------------------+----------+
| Medication Refill |          |
+-------------------+----------+
 
 
 
 Encounter Details
 
 
+--------+--------+---------------------+----------------------+-------------------+
| Date   | Type   | Department          | Care Team            | Description       |
+--------+--------+---------------------+----------------------+-------------------+
| / | Refill |   [place]         |   Marzena Moser  | Medication Refill |
| 2019   |        | NEPHROLOGY  [address]   | M, DO  [address]      |                   |
|        |        | [address]   | [address]      |                   |
|        |        | [place], [place]     | [place], [place]      |                   |
|        |        | [address]          | [address]  [phone]  |                   |
|        |        | [phone]        |  [phone] (Fax)  |                   |
+--------+--------+---------------------+----------------------+-------------------+
 
 
 
 Social History
 
 
+--------------+-------+-----------+--------+------+
| Tobacco Use  | Types | Packs/Day | Years  | Date |
|              |       |           | Used   |      |
+--------------+-------+-----------+--------+------+
| Never Smoker |       |           |        |      |
+--------------+-------+-----------+--------+------+
 
 
 
+---------------------+---+---+---+
| Smokeless Tobacco:  |   |   |   |
| Never Used          |   |   |   |
+---------------------+---+---+---+
 
 
 
+---------------------------------------------------------------+
| Comments: some second hand smoke exposure, but fairly minimal |
 
+---------------------------------------------------------------+
 
 
 
+-------------+-------------+---------+----------+
| Alcohol Use | Drinks/Week | oz/Week | Comments |
+-------------+-------------+---------+----------+
| No          |             |         |          |
+-------------+-------------+---------+----------+
 
 
 
+------------------+---------------+
| Sex Assigned at  | Date Recorded |
| Birth            |               |
+------------------+---------------+
| Not on file      |               |
+------------------+---------------+
 
 
 
+----------------+-------------+-------------+
| Job Start Date | Occupation  | Industry    |
+----------------+-------------+-------------+
| Not on file    | Not on file | Not on file |
+----------------+-------------+-------------+
 
 
 
+----------------+--------------+------------+
| Travel History | Travel Start | Travel End |
+----------------+--------------+------------+
 
 
 
+-------------------------------------+
| No recent travel history available. |
+-------------------------------------+
 documented as of this encounter
 
 Plan of Treatment
 
 
+--------+-----------+------------+----------------------+-------------------+
| Date   | Type      | Specialty  | Care Team            | Description       |
+--------+-----------+------------+----------------------+-------------------+
| / | Office    | Cardiology |   Tonia Wilson,    |                   |
|    | Visit     |            | ARNP  [address]   |                   |
|        |           |            | St [place], [place]  |                   |
|        |           |            | [address]  [phone]  |                   |
|        |           |            |  [phone] (Fax)  |                   |
+--------+-----------+------------+----------------------+-------------------+
| / | Hospital  | Radiology  |   Popeye Townsend, |                   |
|    | Encounter |            |  MD  [address] |                   |
|        |           |            |  [place],   |                   |
|        |           |            | [place] [address]             |                   |
|        |           |            | [phone]         |                   |
|        |           |            | [phone] (Fax)   |                   |
+--------+-----------+------------+----------------------+-------------------+
| / | Surgery   | Radiology  |   Popeye Townsend, | CV EP PPM SYSTEM  |
 
|    |           |            |  MD  [address] | IMPLANT           |
|        |           |            |  [place],   |                   |
|        |           |            | [place] [address]             |                   |
|        |           |            | [phone]         |                   |
|        |           |            | [phone] (Fax)   |                   |
+--------+-----------+------------+----------------------+-------------------+
| 02/10/ | Clinical  | Cardiology |                      |                   |
|    | Support   |            |                      |                   |
+--------+-----------+------------+----------------------+-------------------+
| / | Office    | Cardiology |   RakeshTonia andre,    |                   |
|    | Visit     |            | ARNP  [address]   |                   |
|        |           |            | [place]  |                   |
|        |           |            | [address] [phone]  |                   |
|        |           |            |  [phone] (Fax)  |                   |
+--------+-----------+------------+----------------------+-------------------+
| / | Off-Site  | Nephrology |   Marzena Moser  |                   |
|    | Visit     |            | DO ETIENNE  [address] [place] [address]      |                   |
|        |           |            | [address]      |                   |
|        |           |            | [place]      |                   |
|        |           |            | [address] [phone]  |                   |
|        |           |            |  [phone] (Fax)  |                   |
+--------+-----------+------------+----------------------+-------------------+
 documented as of this encounter
 
 Visit Diagnoses
 Not on filedocumented in this encounter

## 2020-01-08 NOTE — XMS
Encounter Summary
  Created on: 2020
 
 Viviana Ricci
 External Reference #: [id number]
 : 47
 Sex: Female
 
 Demographics
 
 
+-----------------------+----------------------+
| Address               | [address]      |
|                       | [place]  [address] |
+-----------------------+----------------------+
| Home Phone            | [phone]      |
+-----------------------+----------------------+
| Preferred Language    | Unknown              |
+-----------------------+----------------------+
| Marital Status        | Single               |
+-----------------------+----------------------+
| Mosque Affiliation | [id number]                 |
+-----------------------+----------------------+
| Race                  | Unknown              |
+-----------------------+----------------------+
| Ethnic Group          | Unknown              |
+-----------------------+----------------------+
 
 
 Author
 
 
+--------------+--------------------------------------------+
| Author       | [facility]  |
|              | and [facility]                                |
+--------------+--------------------------------------------+
| Organization | [facility]  |
|              | and [facility]                                |
+--------------+--------------------------------------------+
| Address      | Unknown                                    |
+--------------+--------------------------------------------+
| Phone        | Unavailable                                |
+--------------+--------------------------------------------+
 
 
 
 Support
 
 
+----------------+--------------+---------------------+-----------------+
| Name           | Relationship | Address             | Phone           |
+----------------+--------------+---------------------+-----------------+
| Ada/Ed Radhames | ECON         | [address]              | [phone] |
|                |              | [place]  |                 |
|                |              |  [id number]              |                 |
+----------------+--------------+---------------------+-----------------+
 
 
 
 
 Care Team Providers
 
 
+-----------------------+------+-------------+
| Care Team Member Name | Role | Phone       |
+-----------------------+------+-------------+
 PCP  | Unavailable |
+-----------------------+------+-------------+
 
 
 
 Reason for Visit
 Evaluate & Treat (Routine)
 
+--------+--------+------------+--------------+--------------+---------------+
| Status | Reason | Specialty  | Diagnoses /  | Referred By  | Referred To   |
|        |        |            | Procedures   | Contact      | Contact       |
+--------+--------+------------+--------------+--------------+---------------+
| Closed |        | Nephrology |   Diagnoses  |   Stroemel,  |   Stroemel,   |
|        |        |            |  Unspecified | Marzena CLIFTON,   | Marzena CLIFTON DO |
|        |        |            |              | DO  [address] |   [address]    |
|        |        |            | complication |  [place] | [place]   |
|        |        |            |  of kidney   |  [address]  [place]  | [address]  [place]    |
|        |        |            | transplant   | [place]    | [place]     |
|        |        |            | FSGS (focal  | [address]        | [address]  Phone: |
|        |        |            | segmental    | Phone:       |  [phone] |
|        |        |            | glomeruloscl | [phone] |   Fax:        |
|        |        |            | erosis)      |   Fax:       | [phone]  |
|        |        |            | Hypertension | [phone] |               |
|        |        |            | , essential  |              |               |
|        |        |            |  Procedures  |              |               |
|        |        |            |  [place] [facility]   |              |               |
|        |        |            | OUTPATIENT   |              |               |
|        |        |            | VISIT 25     |              |               |
|        |        |            | MINUTES      |              |               |
+--------+--------+------------+--------------+--------------+---------------+
 
 
 
 
 Encounter Details
 
 
+--------+-----------+---------------------+----------------------+----------------------+
| Date   | Type      | Department          | Care Team            | Description          |
+--------+-----------+---------------------+----------------------+----------------------+
| 10/09/ | Off-Site  |   [facility]         |   Marzena Moser  | Kidney replaced by   |
| 2017   | Visit     | NEPHROLOGY  [address]   | M, DO  [address]      | transplant (Primary  |
|        |           | [address]   | [place]      | Dx); Type 2 DM with  |
|        |           | [place], [place]     | [place]      | CKD stage 2 and      |
|        |           | [address]          | [address]  [phone]  | hypertension (HCC);  |
|        |           | [phone]        |  [phone] (Fax)  | Other specified      |
|        |           |                     |                      | hypothyroidism;      |
|        |           |                     |                      | Age-related          |
|        |           |                     |                      | osteoporosis without |
|        |           |                     |                      |  current             |
|        |           |                     |                      | pathological         |
|        |           |                     |                      | fracture             |
+--------+-----------+---------------------+----------------------+----------------------+
 
 
 
 
 Social History
 
 
+--------------+-------+-----------+--------+------+
| Tobacco Use  | Types | Packs/Day | Years  | Date |
|              |       |           | Used   |      |
+--------------+-------+-----------+--------+------+
| Never Smoker |       |           |        |      |
+--------------+-------+-----------+--------+------+
 
 
 
+---------------------+---+---+---+
| Smokeless Tobacco:  |   |   |   |
| Never Used          |   |   |   |
+---------------------+---+---+---+
 
 
 
+---------------------------------------------------------------+
| Comments: some second hand smoke exposure, but fairly minimal |
+---------------------------------------------------------------+
 
 
 
+-------------+-------------+---------+----------+
| Alcohol Use | Drinks/Week | oz/Week | Comments |
+-------------+-------------+---------+----------+
| No          |             |         |          |
+-------------+-------------+---------+----------+
 
 
 
+------------------+---------------+
| Sex Assigned at  | Date Recorded |
| Birth            |               |
+------------------+---------------+
| Not on file      |               |
+------------------+---------------+
 
 
 
+----------------+-------------+-------------+
| Job Start Date | Occupation  | Industry    |
+----------------+-------------+-------------+
| Not on file    | Not on file | Not on file |
+----------------+-------------+-------------+
 
 
 
+----------------+--------------+------------+
| Travel History | Travel Start | Travel End |
+----------------+--------------+------------+
 
 
 
+-------------------------------------+
 
| No recent travel history available. |
+-------------------------------------+
 documented as of this encounter
 
 Last Filed Vital Signs
 
 
+-------------------+---------------------+----------------------+----------+
| Vital Sign        | Reading             | Time Taken           | Comments |
+-------------------+---------------------+----------------------+----------+
| Blood Pressure    | 126/77              | 10/09/2017  4:38 PM  |          |
|                   |                     | PDT                  |          |
+-------------------+---------------------+----------------------+----------+
| Pulse             | -                   | -                    |          |
+-------------------+---------------------+----------------------+----------+
| Temperature       | 36.2   C (97.2   F) | 10/09/2017  4:16 PM  |          |
|                   |                     | PDT                  |          |
+-------------------+---------------------+----------------------+----------+
| Respiratory Rate  | -                   | -                    |          |
+-------------------+---------------------+----------------------+----------+
| Oxygen Saturation | -                   | -                    |          |
+-------------------+---------------------+----------------------+----------+
| Inhaled Oxygen    | -                   | -                    |          |
| Concentration     |                     |                      |          |
+-------------------+---------------------+----------------------+----------+
| Weight            | -                   | -                    |          |
+-------------------+---------------------+----------------------+----------+
| Height            | -                   | -                    |          |
+-------------------+---------------------+----------------------+----------+
| Body Mass Index   | -                   | -                    |          |
+-------------------+---------------------+----------------------+----------+
 documented in this encounter
 
 Progress Notes
 Marzena Moser DO - 10/09/2017  4:00 PM PDTFormatting of this note might be different
 from the original.
 Subjective:  NEPHROLOGY 
  
 Patient ID: Viviana Ricci is a 71 y.o. female.
 
 HPI Comments: 
 Followup for this 70 YO  white female s/p renal allograft, 05, with remote  allograft 
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
demia, SHPTH,  previous low back pain, obesity/JACK, chronic pulmonary  hypertension, histori
izabela related to centripetal obesity, and  CAD, s/p CABG x3 vessels,. 
 
 Viviana presents today for routine longitudinal care of her renal transplant.She has a questio
n about her arm lesions and bruising, and is curious if aspirin could be the cause. It likel
y is the cause, because she is not on any other blood thinners.
 
 She also has some questions about a new diet from a book entitled "The Vedda Blood Sugar Re
medies" which she bought online to help with her diabetes. She is specifically curious about
 taking chromium and Neem tea. 
 
 Lastly, we reviewed lab results. She is reporting adequately in kidney function, tacrolimus
 level, and metabolic markers. All labs are going well, she would like to be tested for madelyn
min D and TSH.
 
 She reports quality sleep, but a tendency to stay up late and sleep for only 6 hours. She a
lso reports some SOB possibly related to seasonal changes.
 
 
 MEDS:
 
 Prograf 1.5 mg, BID
 Mycophenolate 250 mg, BID.
 Prednisone 5 mg, daily.
 
 Current Outpatient Prescriptions: 
    allopurinol (ZYLOPRIM) 100 mg tablet, Take 1 tablet by mouth Daily., Disp: 30 tablet, 
Rfl: 11
    aspirin 81 mg EC tablet, Take 81 mg by mouth Daily., Disp: , Rfl: 
    B-D INS SYRINGE 0.5CC/31GX5/16 31G X 5/16" 0.5 ML MISC, use as directed BEFORE MEALS, 
Disp: 100 each, Rfl: 11
    cholecalciferol (VITAMIN D-3) 2000 UNITS TABS, Take 5,000 Units by mouth Every other d
ay., Disp: , Rfl: 
    cinacalcet (SENSIPAR) 30 mg tablet, Take 1 tablet by mouth Daily., Disp: 30 tablet, Rf
l: 11
    fludrocortisone (FLORINEF) 0.1 mg tablet, Take 1 tablet by mouth Every other day., Dis
p: 90 tablet, Rfl: 4
    furosemide (LASIX) 40 mg tablet, Take 1 tablet by mouth Daily as needed., Disp: 30 tab
let, Rfl: 5
    glucose blood VI test strips (ONE TOUCH ULTRA TEST) strip, Check blood sugar before ea
ch meal and as directed, Disp: 100 each, Rfl: 12
    insulin glargine (LANTUS) 100 units/mL injection (vial), Inject 20 Units under the ski
n every morning., Disp: 10 mL, Rfl: 11
    insulin lispro (HUMALOG) 100 units/mL injection (vial), Inject subcutaneously before m
eals according to sliding scale, Disp: 10 vial, Rfl: 11
    levothyroxine (SYNTHROID) 50 mcg tablet, take 1 tablet by mouth once daily, Disp: 30 t
ablet, Rfl: 11
    lisinopril (PRINIVIL,ZESTRIL) 30 MG tablet, Take 1 tablet by mouth Daily., Disp: 90 ta
blet, Rfl: 3
    loperamide (ANTI-DIARRHEAL) 2 mg capsule, Take 1 capsule by mouth 4 times daily as nee
ded., Disp: 60 capsule, Rfl: 5
    losartan (COZAAR) 50 mg tablet, Take 1 tablet by mouth Daily., Disp: 90 tablet, Rfl: 3
    magnesium oxide (MAG-OX) 400 mg tablet, take 1 tablet by mouth twice a day, Disp: 60 t
ablet, Rfl: 11
    metoprolol tartrate (LOPRESSOR) 25 mg tablet, Take 1 tablet by mouth 2 times daily., D
isp: 60 tablet, Rfl: 11
    mycophenolate (CELLCEPT) 250 mg capsule, TAKE (1) CAPSULE BY MOUTH THREE TIMES DAILY. 
(Patient not taking: Reported on 10/9/2017), Disp: 90 capsule, Rfl: 11
    omeprazole (PRILOSEC) 20 mg capsule, take 1 capsule by mouth once daily ON AN EMPTY ST
OMACH, Disp: 90 capsule, Rfl: 3
    predniSONE (DELTASONE) 5 mg tablet, take 1 tablet by mouth once daily (Patient not anne
ing: Reported on 10/9/2017), Disp: 90 tablet, Rfl: 3
    Prenatal Multivit-Min-Fe-FA (PRENATAL VITAMINS) 0.8 MG TABS, Take 0.8 mg by mouth Mauricio
y., Disp: 30 each, Rfl: 11
    Prenatal Vit-Fe Fumarate-FA (PNV PRENATAL PLUS MULTIVITAMIN) 27-1 MG TABS, , Disp: , R
fl: 1
    Respiratory Therapy Supplies MISC, Decrease CPAP to 12-18 cmH2O Diagnosis Code(s)327.2
3. Please send order to [facility]., Disp: 1 each, Rfl: 0
    rosuvastatin (CRESTOR) 20 mg tablet, take 1 tablet by mouth NIGHTLY, Disp: 30 tablet, 
Rfl: 11
    tacrolimus (PROGRAF) 0.5 mg capsule, TAKE (1) CAPSULE TWICE DAILY WITH 1MG CAPSULE (TO
COOPER DOSE = 1.5MG TWICE DAILY). (Patient not taking: Reported on 10/9/2017), Disp: 60 capsule
, Rfl: 11
    tacrolimus (PROGRAF) 1 mg capsule, TAKE (1) CAPSULE TWICE DAILY WITH 0.5MG CAPSULE (TO
COOPER DOSE = 1.5MG TWICE DAILY) (Patient not taking: Reported on 10/9/2017), Disp: 60 capsule,
 Rfl: 11
 
 No Known Allergies
 
 
 Objective:  
 Vitals: 
  10/09/17 1638 
 BP: 126/77 
 Temp:  
 
  
 
 Physical Exam
 
 HEENT:  no thrush.
 Heart:  Regular rate and rhythm with no S3, S4, murmur or rub.
 Lungs:  CTA  in all fields.
 Abdomen:  soft, obese, renal allograft in RLQ is nontender, normoactive bowel sounds.
 Extremities:  Trace edema, no clubbing, cyanosis,  no foot ulcers.
 
 Lab Results 
 Component Value Date 
  NAEX 142 2017 
  KEX 4.4 2017 
  CLEX 111 2017 
  CO2EX 19 2017 
  BUNEX 29 (A) 2017 
  CREEX 0.93 2017 
  EGFREX 60 2017 
  GLUEX 110 (A) 2017 
  PHOSEX 2.4 (A) 2017 
  MGEX 1.7 2017 
  PTHEX 193.0 (A) 2016 
  SZW7OPN 7.7 2017 
 
 Lab Results 
 Component Value Date 
  CHOLEX 143 2017 
  HDLEX 50.2 2017 
  LDLEX 55 2017 
  TRIGEX 190 (A) 2017 
 
 Lab Results 
 Component Value Date 
  WBCEX 5.3 2017 
  HGBEX 13.8 2017 
  HCTEX 41.8 2017 
  PLTEX 136 (A) 2017 
 
 Lab Results 
 Component Value Date 
  TACROLIMUSEX 6.6 2017 
 
 Lab Results 
 Component Value Date 
  UAEX negative 2017 
  UAEX normal 2017 
  UAEX negative 2017 
  UAEX 6 2017 
  UAEX 25 2017 
  UAEX 0 2017 
  UAEX 1.014 2017 
  UAEX 25 2017 
 
 
 Assessment: 
 
 1.  Renal Allograft, 05 -- Scr is at baseline.  
 2.  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
 3.  Type 2 DM, requiring insuline-- stable.
 4.  Hyperlipidemia--on statin Rx
 5.  Hypertension-- stable on lisinopril and losartan.
 6.  SHPTH--stable.  
 7.  Obesity/JACK/Pulmonary hypertension-- compensated.
 8.  CAD, s/p CABG, --stable on ASA, metoprolol, atorvastatin.
 9.  Type IV RTA--stable.
 10. Chronic Low Back pain--in remission.
 11.   DJD, left knee--about same.
 
 Plan: 
 
 1.  Order more frequent tacrolimus blood level for new diet she is starting to ensure that 
there is no change in metabolism.
 2. Order TSH and Vitamin D along with next tacrolimus level to monitor those levels.
 3. Follow up on 2018 at 2:00PM - she is hoping to weigh 245 lbs.
 
 Clint Lemons, MS IV
 
 I have personally seen and examined this patient. I have fully participated in the care of 
this patient. I have reviewed and agree with all pertinent clinical information including hi
story, physical exam, labs, radiographic studies and the plan. I have also reviewed and agre
e with the medications, allergies and past medical history sections for this patient. 
 
 Electronically signed by: Marzena Moser DO on 10/9/2017 at 18:16
 
 CC:    Jose David Dalal M.D., [facility], [facility]
            Edgar Sanders MD, [facility] Orthopedics
            ONOF[facility] THOMASA.C.S
 
  Electronically signed by Marzena Moser DO at 10/09/2017  6:25 PM PDTdocumented in th
is encounter
 
 Plan of Treatment
 
 
+--------+-----------+------------+----------------------+-------------------+
| Date   | Type      | Specialty  | Care Team            | Description       |
+--------+-----------+------------+----------------------+-------------------+
| / | Office    | Cardiology |   Tonia Wilson,    |                   |
|    | Visit     |            | ARNP  [address]   |                   |
|        |           |            | St  [place], [place]  |                   |
|        |           |            | [address]  [phone]  |                   |
|        |           |            |  [phone] (Fax)  |                   |
+--------+-----------+------------+----------------------+-------------------+
| / | Hospital  | Radiology  |   Popeye Townsend, |                   |
|    | Encounter |            |  MD  [address] |                   |
|        |           |            |  St.  [place],   |                   |
|        |           |            | [place] [address]             |                   |
|        |           |            | [phone]         |                   |
|        |           |            | [phone] (Fax)   |                   |
+--------+-----------+------------+----------------------+-------------------+
| / | Surgery   | Radiology  |   Popeye Townsend, | CV EP PPM SYSTEM  |
|    |           |            |  MD  [address] | IMPLANT           |
|        |           |            |  St.  [place],   |                   |
 
|        |           |            | [place] [address]             |                   |
|        |           |            | [phone]         |                   |
|        |           |            | [phone] (Fax)   |                   |
+--------+-----------+------------+----------------------+-------------------+
| 02/10/ | Clinical  | Cardiology |                      |                   |
|    | Support   |            |                      |                   |
+--------+-----------+------------+----------------------+-------------------+
| / | Office    | Cardiology |   Tonia Wilson,    |                   |
|    | Visit     |            | [facility]  [address]   |                   |
|        |           |            | [place]  |                   |
|        |           |            | [address]  [phone]  |                   |
|        |           |            |  [phone] (Fax)  |                   |
+--------+-----------+------------+----------------------+-------------------+
| / | Off-Site  | Nephrology |   Marzena Moser  |                   |
|    | Visit     |            | DO [facility]  [address] [place] [address]      |                   |
|        |           |            | [address]      |                   |
|        |           |            | [place]      |                   |
|        |           |            | [address]  [phone]  |                   |
|        |           |            |  [phone] (Fax)  |                   |
+--------+-----------+------------+----------------------+-------------------+
 documented as of this encounter
 
 Procedures
 
 
+----------------------+--------+-------------+----------------------+----------------------
+
| Procedure Name       | Priori | Date/Time   | Associated Diagnosis | Comments             
|
|                      | ty     |             |                      |                      
|
+----------------------+--------+-------------+----------------------+----------------------
+
| LABS - EXTERNAL SCAN |        | 2018  |                      |   Results for this   
|
|                      |        | 12:00 AM    |                      | procedure are in the 
|
|                      |        | PDT         |                      |  results section.    
|
+----------------------+--------+-------------+----------------------+----------------------
+
| LABS - EXTERNAL SCAN |        | 2018  |                      |   Results for this   
|
|                      |        | 12:00 AM    |                      | procedure are in the 
|
|                      |        | PDT         |                      |  results section.    
|
+----------------------+--------+-------------+----------------------+----------------------
+
| LABS - EXTERNAL SCAN |        | 2018  |                      |   Results for this   
|
|                      |        | 12:00 AM    |                      | procedure are in the 
|
|                      |        | PDT         |                      |  results section.    
|
+----------------------+--------+-------------+----------------------+----------------------
+
| LABS - EXTERNAL SCAN |        | 2018  |                      |   Results for this   
|
|                      |        | 12:00 AM    |                      | procedure are in the 
 
|
|                      |        | PDT         |                      |  results section.    
|
+----------------------+--------+-------------+----------------------+----------------------
+
 documented in this encounter
 
 Results
 LABS - EXTERNAL SCAN (2018 12:00 AM PDT)
 
+------------------------------------------------------------------------+--------------+
| Narrative                                                              | Performed At |
+------------------------------------------------------------------------+--------------+
|   This result has an attachment that is not available.  Ordered by an  |              |
| unspecified provider.                                                  |              |
+------------------------------------------------------------------------+--------------+
 LABS - EXTERNAL SCAN (2018 12:00 AM PDT)
 
+------------------------------------------------------------------------+--------------+
| Narrative                                                              | Performed At |
+------------------------------------------------------------------------+--------------+
|   This result has an attachment that is not available.  Ordered by an  |              |
| unspecified provider.                                                  |              |
+------------------------------------------------------------------------+--------------+
 LABS - EXTERNAL SCAN (2018 12:00 AM PDT)
 
+------------------------------------------------------------------------+--------------+
| Narrative                                                              | Performed At |
+------------------------------------------------------------------------+--------------+
|   This result has an attachment that is not available.  Ordered by an  |              |
| unspecified provider.                                                  |              |
+------------------------------------------------------------------------+--------------+
 LABS - EXTERNAL SCAN (2018 12:00 AM PDT)
 
+------------------------------------------------------------------------+--------------+
| Narrative                                                              | Performed At |
+------------------------------------------------------------------------+--------------+
|   This result has an attachment that is not available.  Ordered by an  |              |
| unspecified provider.                                                  |              |
+------------------------------------------------------------------------+--------------+
 documented in this encounter
 
 Visit Diagnoses
 
 
+---------------------------------------------------------------------------------------+
| Diagnosis                                                                             |
+---------------------------------------------------------------------------------------+
|   Kidney replaced by transplant - Primary                                             |
+---------------------------------------------------------------------------------------+
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
+---------------------------------------------------------------------------------------+
|   Other specified hypothyroidism                                                      |
+---------------------------------------------------------------------------------------+
|   Age-related osteoporosis without current pathological fracture  Senile osteoporosis |
+---------------------------------------------------------------------------------------+
 documented in this encounter

## 2020-01-08 NOTE — XMS
Encounter Summary
  Created on: 2020
 
 Viviana Ricci
 External Reference #: [id number]
 : 47
 Sex: Female
 
 Demographics
 
 
+-----------------------+----------------------+
| Address               | [address]      |
|                       | [place]  [address] |
+-----------------------+----------------------+
| Home Phone            | [phone]      |
+-----------------------+----------------------+
| Preferred Language    | Unknown              |
+-----------------------+----------------------+
| Marital Status        | Single               |
+-----------------------+----------------------+
| Anglican Affiliation | [id number]                 |
+-----------------------+----------------------+
| Race                  | Unknown              |
+-----------------------+----------------------+
| Ethnic Group          | Unknown              |
+-----------------------+----------------------+
 
 
 Author
 
 
+--------------+--------------------------------------------+
| Author       | [facility]  |
|              | and [place]                                |
+--------------+--------------------------------------------+
| Organization | [facility]  |
|              | and [place]                                |
+--------------+--------------------------------------------+
| Address      | Unknown                                    |
+--------------+--------------------------------------------+
| Phone        | Unavailable                                |
+--------------+--------------------------------------------+
 
 
 
 Support
 
 
+----------------+--------------+---------------------+-----------------+
| Name           | Relationship | Address             | Phone           |
+----------------+--------------+---------------------+-----------------+
| Ada/Ed Radhames | ECON         | [place]              | [phone] |
|                |              | [place]  |                 |
|                |              |  [address]              |                 |
+----------------+--------------+---------------------+-----------------+
 
 
 
 
 Care Team Providers
 
 
+-----------------------+------+-------------+
| Care Team Member Name | Role | Phone       |
+-----------------------+------+-------------+
 PCP  | Unavailable |
+-----------------------+------+-------------+
 
 
 
 Reason for Visit
 
 
+-------------------+----------+
| Reason            | Comments |
+-------------------+----------+
| Medication Refill |          |
+-------------------+----------+
 
 
 
 Encounter Details
 
 
+--------+--------+---------------------+----------------------+-------------------+
| Date   | Type   | Department          | Care Team            | Description       |
+--------+--------+---------------------+----------------------+-------------------+
| / | Refill |   [facility]         |   Marzena Moser  | Medication Refill |
| 2017   |        | NEPHROLOGY  [address]   | M, DO  [address]      |                   |
|        |        | [address]   | [address]      |                   |
|        |        | [place], [place]     | [place], [place]      |                   |
|        |        | [address]          | [address]  [phone]  |                   |
|        |        | [phone]        |  [phone] (Fax)  |                   |
+--------+--------+---------------------+----------------------+-------------------+
 
 
 
 Social History
 
 
+--------------+-------+-----------+--------+------+
| Tobacco Use  | Types | Packs/Day | Years  | Date |
|              |       |           | Used   |      |
+--------------+-------+-----------+--------+------+
| Never Smoker |       |           |        |      |
+--------------+-------+-----------+--------+------+
 
 
 
+---------------------+---+---+---+
| Smokeless Tobacco:  |   |   |   |
| Never Used          |   |   |   |
+---------------------+---+---+---+
 
 
 
+---------------------------------------------------------------+
| Comments: some second hand smoke exposure, but fairly minimal |
 
+---------------------------------------------------------------+
 
 
 
+-------------+-------------+---------+----------+
| Alcohol Use | Drinks/Week | oz/Week | Comments |
+-------------+-------------+---------+----------+
| No          |             |         |          |
+-------------+-------------+---------+----------+
 
 
 
+------------------+---------------+
| Sex Assigned at  | Date Recorded |
| Birth            |               |
+------------------+---------------+
| Not on file      |               |
+------------------+---------------+
 
 
 
+----------------+-------------+-------------+
| Job Start Date | Occupation  | Industry    |
+----------------+-------------+-------------+
| Not on file    | Not on file | Not on file |
+----------------+-------------+-------------+
 
 
 
+----------------+--------------+------------+
| Travel History | Travel Start | Travel End |
+----------------+--------------+------------+
 
 
 
+-------------------------------------+
| No recent travel history available. |
+-------------------------------------+
 documented as of this encounter
 
 Plan of Treatment
 
 
+--------+-----------+------------+----------------------+-------------------+
| Date   | Type      | Specialty  | Care Team            | Description       |
+--------+-----------+------------+----------------------+-------------------+
| / | Office    | Cardiology |   HellalfredoTonia,    |                   |
|    | Visit     |            | ARNP  [address]   |                   |
|        |           |            | St  [place], [place]  |                   |
|        |           |            | [address]  [phone]  |                   |
|        |           |            |  [phone] (Fax)  |                   |
+--------+-----------+------------+----------------------+-------------------+
| / | Hospital  | Radiology  |   Popeye Townsend, |                   |
|    | Encounter |            |  MD  [address] |                   |
|        |           |            |  St.  [place],   |                   |
|        |           |            | [place] [address]             |                   |
|        |           |            | [phone]         |                   |
|        |           |            | [phone] (Fax)   |                   |
+--------+-----------+------------+----------------------+-------------------+
| / | Surgery   | Radiology  |   Popeye Townsend, | CV EP PPM SYSTEM  |
 
|    |           |            |  MD  [address] | IMPLANT           |
|        |           |            |  St.  [place],   |                   |
|        |           |            | [place] [address]             |                   |
|        |           |            | [phone]         |                   |
|        |           |            | [phone] (Fax)   |                   |
+--------+-----------+------------+----------------------+-------------------+
| 02/10/ | Clinical  | Cardiology |                      |                   |
|    | Support   |            |                      |                   |
+--------+-----------+------------+----------------------+-------------------+
| / | Office    | Cardiology |   Tonia Wilson,    |                   |
|    | Visit     |            | ARNP  [address]   |                   |
|        |           |            | [place]  |                   |
|        |           |            | [address]  [phone]  |                   |
|        |           |            |  [phone] (Fax)  |                   |
+--------+-----------+------------+----------------------+-------------------+
| / | Off-Site  | Nephrology |   Marzena Moser  |                   |
|    | Visit     |            | DO ETIENNE  [address] [place] [address]      |                   |
|        |           |            | [address]      |                   |
|        |           |            | [place]      |                   |
|        |           |            | [address]  [phone]  |                   |
|        |           |            |  [phone] (Fax)  |                   |
+--------+-----------+------------+----------------------+-------------------+
 documented as of this encounter
 
 Visit Diagnoses
 Not on filedocumented in this encounter

## 2020-01-08 NOTE — XMS
Encounter Summary
  Created on: 2020
 
 Viviana Ricci
 External Reference #: [id number]
 : 47
 Sex: Female
 
 Demographics
 
 
+-----------------------+----------------------+
| Address               | [address]      |
|                       | [place]  [address] |
+-----------------------+----------------------+
| Home Phone            | [phone]      |
+-----------------------+----------------------+
| Preferred Language    | Unknown              |
+-----------------------+----------------------+
| Marital Status        | Single               |
+-----------------------+----------------------+
| Denominational Affiliation | 1009                 |
+-----------------------+----------------------+
| Race                  | Unknown              |
+-----------------------+----------------------+
| Ethnic Group          | Unknown              |
+-----------------------+----------------------+
 
 
 Author
 
 
+--------------+--------------------------------------------+
| Author       | [facility]  |
|              | and [place]                                |
+--------------+--------------------------------------------+
| Organization | [facility]  |
|              | and [place]                                |
+--------------+--------------------------------------------+
| Address      | Unknown                                    |
+--------------+--------------------------------------------+
| Phone        | Unavailable                                |
+--------------+--------------------------------------------+
 
 
 
 Support
 
 
+----------------+--------------+---------------------+-----------------+
| Name           | Relationship | Address             | Phone           |
+----------------+--------------+---------------------+-----------------+
| Ada/Ed Radhames | ECON         | [place]              | [phone] |
|                |              | [place]  |                 |
|                |              |  [address]              |                 |
+----------------+--------------+---------------------+-----------------+
 
 
 
 
 Care Team Providers
 
 
+------------------------+------+-----------------+
| Care Team Member Name  | Role | Phone           |
+------------------------+------+-----------------+
| Marzena Moser DO | PCP  | [phone] |
+------------------------+------+-----------------+
 
 
 
 Encounter Details
 
 
+--------+----------+---------------------+----------------------+-------------+
| Date   | Type     | Department          | Care Team            | Description |
+--------+----------+---------------------+----------------------+-------------+
| / | Abstract |   [facility] [place]         |   Marzena Moser  |             |
| 2018   |          | NEPHROLOGY  [address]   | DO ETIENNE  [address]      |             |
|        |          | [address]   | [address]      |             |
|        |          | [place], [place]     | [place], [place]      |             |
|        |          | [address]          | [address]  [phone]  |             |
|        |          | [phone]        |  [phone] (Fax)  |             |
+--------+----------+---------------------+----------------------+-------------+
 
 
 
 Social History
 
 
+--------------+-------+-----------+--------+------+
| Tobacco Use  | Types | Packs/Day | Years  | Date |
|              |       |           | Used   |      |
+--------------+-------+-----------+--------+------+
| Never Smoker |       |           |        |      |
+--------------+-------+-----------+--------+------+
 
 
 
+---------------------+---+---+---+
| Smokeless Tobacco:  |   |   |   |
| Never Used          |   |   |   |
+---------------------+---+---+---+
 
 
 
+---------------------------------------------------------------+
| Comments: some second hand smoke exposure, but fairly minimal |
+---------------------------------------------------------------+
 
 
 
+-------------+-------------+---------+----------+
| Alcohol Use | Drinks/Week | oz/Week | Comments |
+-------------+-------------+---------+----------+
| No          |             |         |          |
+-------------+-------------+---------+----------+
 
 
 
 
+------------------+---------------+
| Sex Assigned at  | Date Recorded |
| Birth            |               |
+------------------+---------------+
| Not on file      |               |
+------------------+---------------+
 
 
 
+----------------+-------------+-------------+
| Job Start Date | Occupation  | Industry    |
+----------------+-------------+-------------+
| Not on file    | Not on file | Not on file |
+----------------+-------------+-------------+
 
 
 
+----------------+--------------+------------+
| Travel History | Travel Start | Travel End |
+----------------+--------------+------------+
 
 
 
+-------------------------------------+
| No recent travel history available. |
+-------------------------------------+
 documented as of this encounter
 
 Plan of Treatment
 
 
+--------+-----------+------------+----------------------+-------------------+
| Date   | Type      | Specialty  | Care Team            | Description       |
+--------+-----------+------------+----------------------+-------------------+
| / | Office    | Cardiology |   Tonia Wilson,    |                   |
|    | Visit     |            | ARNP  [address]   |                   |
|        |           |            | St  [place] [facility] [place]  |                   |
|        |           |            | [address]  [phone]  |                   |
|        |           |            |  [phone] (Fax)  |                   |
+--------+-----------+------------+----------------------+-------------------+
| / | Hospital  | Radiology  |   Popeye Townsend, |                   |
|    | Encounter |            |  MD  [address] |                   |
|        |           |            |  St.  [place],   |                   |
|        |           |            | [place] [address]             |                   |
|        |           |            | [phone]         |                   |
|        |           |            | [phone] (Fax)   |                   |
+--------+-----------+------------+----------------------+-------------------+
| / | Surgery   | Radiology  |   Popeye Townsend, | CV EP PPM SYSTEM  |
|    |           |            |  MD  [address] | IMPLANT           |
|        |           |            |  St.  [place],   |                   |
|        |           |            | [place] [address]             |                   |
|        |           |            | [phone]         |                   |
|        |           |            | [phone] (Fax)   |                   |
+--------+-----------+------------+----------------------+-------------------+
| 02/10/ | Clinical  | Cardiology |                      |                   |
2020   | Support   |            |                      |                   |
+--------+-----------+------------+----------------------+-------------------+
| / | Office    | Cardiology |   Tonia Wilson,    |                   |
|    | Visit     |            | [facility]  [address]   |                   |
 
|        |           |            |   [place]  |                   |
|        |           |            | [address]  [phone]  |                   |
|        |           |            |  [phone] (Fax)  |                   |
+--------+-----------+------------+----------------------+-------------------+
| / | Off-Site  | Nephrology |   Marzena Moser  |                   |
2020   | Visit     |            | DO ETIENNE  [address]      |                   |
|        |           |            | [address]      |                   |
|        |           |            | [place]      |                   |
|        |           |            | [address]  [phone]  |                   |
|        |           |            |  [phone] (Fax)  |                   |
+--------+-----------+------------+----------------------+-------------------+
 documented as of this encounter
 
 Procedures
 
 
+--------------------+--------+------------+----------------------+----------------------+
| Procedure Name     | Priori | Date/Time  | Associated Diagnosis | Comments             |
|                    | ty     |            |                      |                      |
+--------------------+--------+------------+----------------------+----------------------+
| EXTERNAL LAB:      | Routin | 2018 |                      |   Results for this   |
| TACROLIMUS LEVEL,  | e      |            |                      | procedure are in the |
| LC-MS/MS           |        |            |                      |  results section.    |
+--------------------+--------+------------+----------------------+----------------------+
 documented in this encounter
 
 Results
 External Lab: Tacrolimus Level, LC-MS/MS (2018)
 
+-------------+-------+-----------+------------+--------------+
| Component   | Value | Ref Range | Performed  | Pathologist  |
|             |       |           | At         | Signature    |
+-------------+-------+-----------+------------+--------------+
| Tacrolimus, | 4.1   |           |            |              |
|  LC-MS/MS,  |       |           |            |              |
| External    |       |           |            |              |
+-------------+-------+-----------+------------+--------------+
 
 
 
+----------+
| Specimen |
+----------+
|          |
+----------+
 documented in this encounter
 
 Visit Diagnoses
 Not on filedocumented in this encounter

## 2020-01-08 NOTE — XMS
Encounter Summary
  Created on: 2020
 
 Viviana Ricci
 External Reference #: [id number]
 : 47
 Sex: Female
 
 Demographics
 
 
+-----------------------+----------------------+
| Address               | [address]      |
|                       | [place]  [address] |
+-----------------------+----------------------+
| Home Phone            | [phone]      |
+-----------------------+----------------------+
| Preferred Language    | Unknown              |
+-----------------------+----------------------+
| Marital Status        | Single               |
+-----------------------+----------------------+
| Mormonism Affiliation | [id number]                 |
+-----------------------+----------------------+
| Race                  | Unknown              |
+-----------------------+----------------------+
| Ethnic Group          | Unknown              |
+-----------------------+----------------------+
 
 
 Author
 
 
+--------------+--------------------------------------------+
| Author       | [facility]  |
|              | and [place]                                |
+--------------+--------------------------------------------+
| Organization | [facility]  |
|              | and [place]                                |
+--------------+--------------------------------------------+
| Address      | Unknown                                    |
+--------------+--------------------------------------------+
| Phone        | Unavailable                                |
+--------------+--------------------------------------------+
 
 
 
 Support
 
 
+----------------+--------------+---------------------+-----------------+
| Name           | Relationship | Address             | Phone           |
+----------------+--------------+---------------------+-----------------+
| Ada/Ed Radhames | ECON         | [address]              | [phone] |
|                |              | [place]  |                 |
|                |              |  [address]              |                 |
+----------------+--------------+---------------------+-----------------+
 
 
 
 
 Care Team Providers
 
 
+-----------------------+------+-------------+
| Care Team Member Name | Role | Phone       |
+-----------------------+------+-------------+
 PCP  | Unavailable |
+-----------------------+------+-------------+
 
 
 
 Encounter Details
 
 
+--------+-------------+---------------------+----------------------+-------------+
| Date   | Type        | Department          | Care Team            | Description |
+--------+-------------+---------------------+----------------------+-------------+
| / | Documentati |   [facility]         |   Marzena Moser  |             |
| 2018   | on          | NEPHROLOGY  [address]   | M, DO  [address]      |             |
|        |             | [address]   | [address]      |             |
|        |             | [place]     | [place]      |             |
|        |             | [address]          | [address]  [phone]  |             |
|        |             | [phone]        |  [phone] (Fax)  |             |
+--------+-------------+---------------------+----------------------+-------------+
 
 
 
 Social History
 
 
+--------------+-------+-----------+--------+------+
| Tobacco Use  | Types | Packs/Day | Years  | Date |
|              |       |           | Used   |      |
+--------------+-------+-----------+--------+------+
| Never Smoker |       |           |        |      |
+--------------+-------+-----------+--------+------+
 
 
 
+---------------------+---+---+---+
| Smokeless Tobacco:  |   |   |   |
| Never Used          |   |   |   |
+---------------------+---+---+---+
 
 
 
+---------------------------------------------------------------+
| Comments: some second hand smoke exposure, but fairly minimal |
+---------------------------------------------------------------+
 
 
 
+-------------+-------------+---------+----------+
| Alcohol Use | Drinks/Week | oz/Week | Comments |
+-------------+-------------+---------+----------+
| No          |             |         |          |
+-------------+-------------+---------+----------+
 
 
 
 
+------------------+---------------+
| Sex Assigned at  | Date Recorded |
| Birth            |               |
+------------------+---------------+
| Not on file      |               |
+------------------+---------------+
 
 
 
+----------------+-------------+-------------+
| Job Start Date | Occupation  | Industry    |
+----------------+-------------+-------------+
| Not on file    | Not on file | Not on file |
+----------------+-------------+-------------+
 
 
 
+----------------+--------------+------------+
| Travel History | Travel Start | Travel End |
+----------------+--------------+------------+
 
 
 
+-------------------------------------+
| No recent travel history available. |
+-------------------------------------+
 documented as of this encounter
 
 Progress Notes
 Maite Raygoza - 2018  3:08 PM PDTManually faxed progress note from 18 to In [facility] ph:  [phone], fx:  [phone].Electronically signed by Maite Raygoza at 
2018  3:10 PM PDTdocumented in this encounter
 
 Plan of Treatment
 
 
+--------+-----------+------------+----------------------+-------------------+
| Date   | Type      | Specialty  | Care Team            | Description       |
+--------+-----------+------------+----------------------+-------------------+
| / | Office    | Cardiology |   Tonia Wilson,    |                   |
|    | Visit     |            | ARNJESSICA  [address]   |                   |
|        |           |            | [place]  |                   |
|        |           |            | [address]  [phone]  |                   |
|        |           |            |  [phone] (Fax)  |                   |
+--------+-----------+------------+----------------------+-------------------+
| / | Hospital  | Radiology  |   Popeye Townsend, |                   |
|    | Encounter |            |  MD  [address] |                   |
|        |           |            |  [place],   |                   |
|        |           |            | [place] [address]             |                   |
|        |           |            | [phone]         |                   |
|        |           |            | [phone] (Fax)   |                   |
+--------+-----------+------------+----------------------+-------------------+
| / | Surgery   | Radiology  |   Popeye Townsend, | CV EP PPM SYSTEM  |
|    |           |            |  MD  [address] | IMPLANT           |
|        |           |            |  [place],   |                   |
|        |           |            | [place] [address]             |                   |
|        |           |            | [phone]         |                   |
|        |           |            | [phone] (Fax)   |                   |
+--------+-----------+------------+----------------------+-------------------+
 
| 02/10/ | Clinical  | Cardiology |                      |                   |
|    | Support   |            |                      |                   |
+--------+-----------+------------+----------------------+-------------------+
| / | Office    | Cardiology |   Tonia Wilson,    |                   |
|    | Visit     |            | BELKIS  [address]   |                   |
|        |           |            | [place]  |                   |
|        |           |            | [address]  [phone]  |                   |
|        |           |            |  [phone] (Fax)  |                   |
+--------+-----------+------------+----------------------+-------------------+
| / | Off-Site  | Nephrology |   Marzena Moser  |                   |
|    | Visit     |            | M, DO  [address]      |                   |
|        |           |            | [address]      |                   |
|        |           |            | [place]      |                   |
|        |           |            | [address]  [phone]  |                   |
|        |           |            |  [phone] (Fax)  |                   |
+--------+-----------+------------+----------------------+-------------------+
 documented as of this encounter
 
 Visit Diagnoses
 Not on filedocumented in this encounter

## 2020-01-08 NOTE — XMS
Encounter Summary
  Created on: 2020
 
 Viviana Ricci
 External Reference #: [id number]
 : 47
 Sex: Female
 
 Demographics
 
 
+-----------------------+----------------------+
| Address               | [address]      |
|                       | [place]  [address] |
+-----------------------+----------------------+
| Home Phone            | [phone]      |
+-----------------------+----------------------+
| Preferred Language    | Unknown              |
+-----------------------+----------------------+
| Marital Status        | Single               |
+-----------------------+----------------------+
| Druze Affiliation | 1009                 |
+-----------------------+----------------------+
| Race                  | Unknown              |
+-----------------------+----------------------+
| Ethnic Group          | Unknown              |
+-----------------------+----------------------+
 
 
 Author
 
 
+--------------+--------------------------------------------+
| Author       | [facility]  |
|              | and [place]                                |
+--------------+--------------------------------------------+
| Organization | [facility] [place]  |
|              | and [place]                                |
+--------------+--------------------------------------------+
| Address      | Unknown                                    |
+--------------+--------------------------------------------+
| Phone        | Unavailable                                |
+--------------+--------------------------------------------+
 
 
 
 Support
 
 
+----------------+--------------+---------------------+-----------------+
| Name           | Relationship | Address             | Phone           |
+----------------+--------------+---------------------+-----------------+
| Ada/Ed Radhames | ECON         | [address]              | [phone] |
|                |              | [place]  |                 |
|                |              |  [address]              |                 |
+----------------+--------------+---------------------+-----------------+
 
 
 
 
 Care Team Providers
 
 
+-----------------------+------+-------------+
| Care Team Member Name | Role | Phone       |
+-----------------------+------+-------------+
 PCP  | Unavailable |
+-----------------------+------+-------------+
 
 
 
 Reason for Visit
 
 
+-------------------+----------+
| Reason            | Comments |
+-------------------+----------+
| Medication Refill |          |
+-------------------+----------+
 
 
 
 Encounter Details
 
 
+--------+--------+---------------------+----------------------+-------------------+
| Date   | Type   | Department          | Care Team            | Description       |
+--------+--------+---------------------+----------------------+-------------------+
| 10/06/ | Refill |   [facility]         |   Marzena Moser  | Medication Refill |
|    |        | NEPHROLOGY  [address]   | M, DO  [address]      |                   |
|        |        | [address]   | [address]      |                   |
|        |        | [place], [place]     | [place], [place]      |                   |
|        |        | [address]          | [address]  [phone]  |                   |
|        |        | [phone]        |  [phone] (Fax)  |                   |
+--------+--------+---------------------+----------------------+-------------------+
 
 
 
 Social History
 
 
+--------------+-------+-----------+--------+------+
| Tobacco Use  | Types | Packs/Day | Years  | Date |
|              |       |           | Used   |      |
+--------------+-------+-----------+--------+------+
| Never Smoker |       |           |        |      |
+--------------+-------+-----------+--------+------+
 
 
 
+---------------------+---+---+---+
| Smokeless Tobacco:  |   |   |   |
| Never Used          |   |   |   |
+---------------------+---+---+---+
 
 
 
+---------------------------------------------------------------+
| Comments: some second hand smoke exposure, but fairly minimal |
 
+---------------------------------------------------------------+
 
 
 
+-------------+-------------+---------+----------+
| Alcohol Use | Drinks/Week | oz/Week | Comments |
+-------------+-------------+---------+----------+
| No          |             |         |          |
+-------------+-------------+---------+----------+
 
 
 
+------------------+---------------+
| Sex Assigned at  | Date Recorded |
| Birth            |               |
+------------------+---------------+
| Not on file      |               |
+------------------+---------------+
 
 
 
+----------------+-------------+-------------+
| Job Start Date | Occupation  | Industry    |
+----------------+-------------+-------------+
| Not on file    | Not on file | Not on file |
+----------------+-------------+-------------+
 
 
 
+----------------+--------------+------------+
| Travel History | Travel Start | Travel End |
+----------------+--------------+------------+
 
 
 
+-------------------------------------+
| No recent travel history available. |
+-------------------------------------+
 documented as of this encounter
 
 Plan of Treatment
 
 
+--------+-----------+------------+----------------------+-------------------+
| Date   | Type      | Specialty  | Care Team            | Description       |
+--------+-----------+------------+----------------------+-------------------+
| / | Office    | Cardiology |   HellalfredoTonia,    |                   |
|    | Visit     |            | ARNP  [address]   |                   |
|        |           |            | St  [place], [place]  |                   |
|        |           |            | [address]  [phone]  |                   |
|        |           |            |  [phone] (Fax)  |                   |
+--------+-----------+------------+----------------------+-------------------+
| / | Hospital  | Radiology  |   Popeye Townsend, |                   |
|    | Encounter |            |  MD  [address] |                   |
|        |           |            |  St.  [place],   |                   |
|        |           |            | [place] [address]             |                   |
|        |           |            | [phone]         |                   |
|        |           |            | [phone] (Fax)   |                   |
+--------+-----------+------------+----------------------+-------------------+
| / | Surgery   | Radiology  |   Popeye Townsend, | CV EP PPM SYSTEM  |
 
|    |           |            |  MD  [address] | IMPLANT           |
|        |           |            |  St.  [place],   |                   |
|        |           |            | [place] [address]             |                   |
|        |           |            | [phone]         |                   |
|        |           |            | [phone] (Fax)   |                   |
+--------+-----------+------------+----------------------+-------------------+
| 02/10/ | Clinical  | Cardiology |                      |                   |
|    | Support   |            |                      |                   |
+--------+-----------+------------+----------------------+-------------------+
| / | Office    | Cardiology |   Tonia Wilson,    |                   |
|    | Visit     |            | ARNP  [address]   |                   |
|        |           |            | [place]  |                   |
|        |           |            | [address]  [phone]  |                   |
|        |           |            |  [phone] (Fax)  |                   |
+--------+-----------+------------+----------------------+-------------------+
| / | Off-Site  | Nephrology |   Marzena Moser  |                   |
|    | Visit     |            | DO ETIENNE  [address] [place] [address]      |                   |
|        |           |            | [address]      |                   |
|        |           |            | [place]      |                   |
|        |           |            | [address]  [phone]  |                   |
|        |           |            |  [phone] (Fax)  |                   |
+--------+-----------+------------+----------------------+-------------------+
 documented as of this encounter
 
 Visit Diagnoses
 Not on filedocumented in this encounter

## 2020-01-08 NOTE — XMS
Encounter Summary
  Created on: 2020
 
 Viviana Ricci
 External Reference #: [id number]
 : 47
 Sex: Female
 
 Demographics
 
 
+-----------------------+----------------------+
| Address               | [address]      |
|                       | [place]  [address] |
+-----------------------+----------------------+
| Home Phone            | [phone]      |
+-----------------------+----------------------+
| Preferred Language    | Unknown              |
+-----------------------+----------------------+
| Marital Status        | Single               |
+-----------------------+----------------------+
| Episcopal Affiliation | 1009                 |
+-----------------------+----------------------+
| Race                  | Unknown              |
+-----------------------+----------------------+
| Ethnic Group          | Unknown              |
+-----------------------+----------------------+
 
 
 Author
 
 
+--------------+--------------------------------------------+
| Author       | [facility]  |
|              | and [place]                                |
+--------------+--------------------------------------------+
| Organization | [facility]  |
|              | and [place]                                |
+--------------+--------------------------------------------+
| Address      | Unknown                                    |
+--------------+--------------------------------------------+
| Phone        | Unavailable                                |
+--------------+--------------------------------------------+
 
 
 
 Support
 
 
+----------------+--------------+---------------------+-----------------+
| Name           | Relationship | Address             | Phone           |
+----------------+--------------+---------------------+-----------------+
| Ada/Ed Radhames | ECON         | [place]              | [phone] |
|                |              | [place]  |                 |
|                |              |  [address]              |                 |
+----------------+--------------+---------------------+-----------------+
 
 
 
 
 Care Team Providers
 
 
+-----------------------+------+-------------+
| Care Team Member Name | Role | Phone       |
+-----------------------+------+-------------+
 PCP  | Unavailable |
+-----------------------+------+-------------+
 
 
 
 Reason for Visit
 
 
+-------------------+----------+
| Reason            | Comments |
+-------------------+----------+
| Medication Refill |          |
+-------------------+----------+
 
 
 
 Encounter Details
 
 
+--------+--------+---------------------+----------------------+-------------------+
| Date   | Type   | Department          | Care Team            | Description       |
+--------+--------+---------------------+----------------------+-------------------+
| / | Refill |   [facility]         |   Marzena Moser  | Medication Refill |
| 2017   |        | NEPHROLOGY  [address]   | M, DO  [address]      |                   |
|        |        | [address]   | [address]      |                   |
|        |        | [place], [place]     | [place], [place]      |                   |
|        |        | [address]          | [address]  [phone]  |                   |
|        |        | [phone]        |  [phone] (Fax)  |                   |
+--------+--------+---------------------+----------------------+-------------------+
 
 
 
 Social History
 
 
+--------------+-------+-----------+--------+------+
| Tobacco Use  | Types | Packs/Day | Years  | Date |
|              |       |           | Used   |      |
+--------------+-------+-----------+--------+------+
| Never Smoker |       |           |        |      |
+--------------+-------+-----------+--------+------+
 
 
 
+---------------------+---+---+---+
| Smokeless Tobacco:  |   |   |   |
| Never Used          |   |   |   |
+---------------------+---+---+---+
 
 
 
+---------------------------------------------------------------+
| Comments: some second hand smoke exposure, but fairly minimal |
 
+---------------------------------------------------------------+
 
 
 
+-------------+-------------+---------+----------+
| Alcohol Use | Drinks/Week | oz/Week | Comments |
+-------------+-------------+---------+----------+
| No          |             |         |          |
+-------------+-------------+---------+----------+
 
 
 
+------------------+---------------+
| Sex Assigned at  | Date Recorded |
| Birth            |               |
+------------------+---------------+
| Not on file      |               |
+------------------+---------------+
 
 
 
+----------------+-------------+-------------+
| Job Start Date | Occupation  | Industry    |
+----------------+-------------+-------------+
| Not on file    | Not on file | Not on file |
+----------------+-------------+-------------+
 
 
 
+----------------+--------------+------------+
| Travel History | Travel Start | Travel End |
+----------------+--------------+------------+
 
 
 
+-------------------------------------+
| No recent travel history available. |
+-------------------------------------+
 documented as of this encounter
 
 Plan of Treatment
 
 
+--------+-----------+------------+----------------------+-------------------+
| Date   | Type      | Specialty  | Care Team            | Description       |
+--------+-----------+------------+----------------------+-------------------+
| / | Office    | Cardiology |   HellalfredoTonia,    |                   |
|    | Visit     |            | ARNP  [address]   |                   |
|        |           |            | St  [place], [place]  |                   |
|        |           |            | [address]  [phone]  |                   |
|        |           |            |  [phone] (Fax)  |                   |
+--------+-----------+------------+----------------------+-------------------+
| / | Hospital  | Radiology  |   Popeye Townsend, |                   |
|    | Encounter |            |  MD  [address] |                   |
|        |           |            |  St.  [place],   |                   |
|        |           |            | [place] [address]             |                   |
|        |           |            | [phone]         |                   |
|        |           |            | [phone] (Fax)   |                   |
+--------+-----------+------------+----------------------+-------------------+
| / | Surgery   | Radiology  |   Popeye Townsend, | CV EP PPM SYSTEM  |
 
|    |           |            |  MD  [address] | IMPLANT           |
|        |           |            |  St.  [place],   |                   |
|        |           |            | [place] [address]             |                   |
|        |           |            | [phone]         |                   |
|        |           |            | [phone] (Fax)   |                   |
+--------+-----------+------------+----------------------+-------------------+
| 02/10/ | Clinical  | Cardiology |                      |                   |
|    | Support   |            |                      |                   |
+--------+-----------+------------+----------------------+-------------------+
| / | Office    | Cardiology |   Tonia Wilson,    |                   |
|    | Visit     |            | ARNP  [address]   |                   |
|        |           |            | [place]  |                   |
|        |           |            | [address]  [phone]  |                   |
|        |           |            |  [phone] (Fax)  |                   |
+--------+-----------+------------+----------------------+-------------------+
| / | Off-Site  | Nephrology |   Marzena Moser  |                   |
|    | Visit     |            | DO ETIENNE  [address] [place] [address]      |                   |
|        |           |            | [address]      |                   |
|        |           |            | [place]      |                   |
|        |           |            | [address]  [phone]  |                   |
|        |           |            |  [phone] (Fax)  |                   |
+--------+-----------+------------+----------------------+-------------------+
 documented as of this encounter
 
 Visit Diagnoses
 
 
+------------------------------------------------------------------------------+
| Diagnosis                                                                    |
+------------------------------------------------------------------------------+
|   Essential hypertension with goal blood pressure less than 130/80 - Primary |
+------------------------------------------------------------------------------+
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
+------------------------------------------------------------------------------+
|   Other specified hypothyroidism                                             |
+------------------------------------------------------------------------------+
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
+------------------------------------------------------------------------------+
 documented in this encounter

## 2020-01-08 NOTE — XMS
Encounter Summary
  Created on: 2020
 
 Viviana Ricci
 External Reference #: [id number]
 : 47
 Sex: Female
 
 Demographics
 
 
+-----------------------+----------------------+
| Address               | [address]      |
|                       | [place]  [address] |
+-----------------------+----------------------+
| Home Phone            | [phone]      |
+-----------------------+----------------------+
| Preferred Language    | Unknown              |
+-----------------------+----------------------+
| Marital Status        | Single               |
+-----------------------+----------------------+
| Faith Affiliation | 1009                 |
+-----------------------+----------------------+
| Race                  | Unknown              |
+-----------------------+----------------------+
| Ethnic Group          | Unknown              |
+-----------------------+----------------------+
 
 
 Author
 
 
+--------------+--------------------------------------------+
| Author       | [facility] [place]  |
|              | and [place]                                |
+--------------+--------------------------------------------+
| Organization | [facility]  |
|              | and [place]                                |
+--------------+--------------------------------------------+
| Address      | Unknown                                    |
+--------------+--------------------------------------------+
| Phone        | Unavailable                                |
+--------------+--------------------------------------------+
 
 
 
 Support
 
 
+----------------+--------------+---------------------+-----------------+
| Name           | Relationship | Address             | Phone           |
+----------------+--------------+---------------------+-----------------+
| Ada/Ed Radhames | ECON         | [address]              | [phone] |
|                |              | [place]  |                 |
|                |              |  [address]              |                 |
+----------------+--------------+---------------------+-----------------+
 
 
 
 
 Care Team Providers
 
 
+-----------------------+------+-------------+
| Care Team Member Name | Role | Phone       |
+-----------------------+------+-------------+
 PCP  | Unavailable |
+-----------------------+------+-------------+
 
 
 
 Reason for Visit
 
 
+--------------+----------+
| Reason       | Comments |
+--------------+----------+
| Leg Swelling | Left     |
+--------------+----------+
 
 
 
 Encounter Details
 
 
+--------+-----------+---------------------+----------------------+---------------------+
| Date   | Type      | Department          | Care Team            | Description         |
+--------+-----------+---------------------+----------------------+---------------------+
| / | Telephone |   [facility] [place]         |   Marzena Moser  | Leg Swelling (Left) |
|    |           | NEPHROLOGY  [address]   | M, DO  [address]      |                     |
|        |           | [address]   | [address]      |                     |
|        |           | [place], [place]     | [place], [place]      |                     |
|        |           | [address]          | [address]  [phone]  |                     |
|        |           | [phone]        |  [phone] (Fax)  |                     |
+--------+-----------+---------------------+----------------------+---------------------+
 
 
 
 Social History
 
 
+--------------+-------+-----------+--------+------+
| Tobacco Use  | Types | Packs/Day | Years  | Date |
|              |       |           | Used   |      |
+--------------+-------+-----------+--------+------+
| Never Smoker |       |           |        |      |
+--------------+-------+-----------+--------+------+
 
 
 
+---------------------+---+---+---+
| Smokeless Tobacco:  |   |   |   |
| Never Used          |   |   |   |
+---------------------+---+---+---+
 
 
 
+---------------------------------------------------------------+
| Comments: some second hand smoke exposure, but fairly minimal |
 
+---------------------------------------------------------------+
 
 
 
+-------------+-------------+---------+----------+
| Alcohol Use | Drinks/Week | oz/Week | Comments |
+-------------+-------------+---------+----------+
| No          |             |         |          |
+-------------+-------------+---------+----------+
 
 
 
+------------------+---------------+
| Sex Assigned at  | Date Recorded |
| Birth            |               |
+------------------+---------------+
| Not on file      |               |
+------------------+---------------+
 
 
 
+----------------+-------------+-------------+
| Job Start Date | Occupation  | Industry    |
+----------------+-------------+-------------+
| Not on file    | Not on file | Not on file |
+----------------+-------------+-------------+
 
 
 
+----------------+--------------+------------+
| Travel History | Travel Start | Travel End |
+----------------+--------------+------------+
 
 
 
+-------------------------------------+
| No recent travel history available. |
+-------------------------------------+
 documented as of this encounter
 
 Plan of Treatment
 
 
+--------+-----------+------------+----------------------+-------------------+
| Date   | Type      | Specialty  | Care Team            | Description       |
+--------+-----------+------------+----------------------+-------------------+
| / | Office    | Cardiology |   HelmaxxalfredoTonia,    |                   |
|    | Visit     |            | ARNJESSICA  [address]   |                   |
|        |           |            | St  [place], [place]  |                   |
|        |           |            | [address]  [phone]  |                   |
|        |           |            |  [phone] (Fax)  |                   |
+--------+-----------+------------+----------------------+-------------------+
| / | Hospital  | Radiology  |   Popeye Townsend, |                   |
|    | Encounter |            |  MD  [address] |                   |
|        |           |            |  St.  [place],   |                   |
|        |           |            | [place] [address]             |                   |
|        |           |            | [phone]         |                   |
|        |           |            | [phone] (Fax)   |                   |
+--------+-----------+------------+----------------------+-------------------+
| / | Surgery   | Radiology  |   Popeye Townsend, | CV EP PPM SYSTEM  |
 
|    |           |            |  MD  [address] | IMPLANT           |
|        |           |            |  St.  [place],   |                   |
|        |           |            | [place] [address]             |                   |
|        |           |            | [phone]         |                   |
|        |           |            | [phone] (Fax)   |                   |
+--------+-----------+------------+----------------------+-------------------+
| 02/10/ | Clinical  | Cardiology |                      |                   |
|    | Support   |            |                      |                   |
+--------+-----------+------------+----------------------+-------------------+
| / | Office    | Cardiology |   Tonia Wilson,    |                   |
|    | Visit     |            | ARNP  [address]   |                   |
|        |           |            | [place]  |                   |
|        |           |            | [address]  [phone]  |                   |
|        |           |            |  [phone] (Fax)  |                   |
+--------+-----------+------------+----------------------+-------------------+
| / | Off-Site  | Nephrology |   Marzena Moser  |                   |
|    | Visit     |            | DO ETIENNE  [facility]      |                   |
|        |           |            | [address]      |                   |
|        |           |            | [place]      |                   |
|        |           |            | [address]  [phone]  |                   |
|        |           |            |  [phone] (Fax)  |                   |
+--------+-----------+------------+----------------------+-------------------+
 documented as of this encounter
 
 Visit Diagnoses
 Not on filedocumented in this encounter

## 2020-01-08 NOTE — XMS
Encounter Summary
  Created on: 2020
 
 Viviana Ricci
 External Reference #: [id number]
 : 47
 Sex: Female
 
 Demographics
 
 
+-----------------------+----------------------+
| Address               | [address]      |
|                       | [place]  [address] |
+-----------------------+----------------------+
| Home Phone            | [phone]      |
+-----------------------+----------------------+
| Preferred Language    | Unknown              |
+-----------------------+----------------------+
| Marital Status        | Single               |
+-----------------------+----------------------+
| [employer] Affiliation | [id number]                 |
+-----------------------+----------------------+
| Race                  | Unknown              |
+-----------------------+----------------------+
| Ethnic Group          | Unknown              |
+-----------------------+----------------------+
 
 
 Author
 
 
+--------------+--------------------------------------------+
| Author       | [facility]  |
|              | and [place]                                |
+--------------+--------------------------------------------+
| Organization | [facility]  |
|              | and [place]                                |
+--------------+--------------------------------------------+
| Address      | Unknown                                    |
+--------------+--------------------------------------------+
| Phone        | Unavailable                                |
+--------------+--------------------------------------------+
 
 
 
 Support
 
 
+----------------+--------------+---------------------+-----------------+
| Name           | Relationship | Address             | Phone           |
+----------------+--------------+---------------------+-----------------+
| Ada/Ed Radhames | ECON         | [address]              | [phone] |
|                |              | [place]  |                 |
|                |              |  [address]              |                 |
+----------------+--------------+---------------------+-----------------+
 
 
 
 
 Care Team Providers
 
 
+-----------------------+------+-------------+
| Care Team Member Name | Role | Phone       |
+-----------------------+------+-------------+
 PCP  | Unavailable |
+-----------------------+------+-------------+
 
 
 
 Reason for Visit
 
 
+---------+----------+
| Reason  | Comments |
+---------+----------+
| Results |          |
+---------+----------+
 
 
 
 Encounter Details
 
 
+--------+-----------+----------------------+----------------+-------------+
| Date   | Type      | Department           | Care Team      | Description |
+--------+-----------+----------------------+----------------+-------------+
| 06/19/ | Telephone |   [facility]          |   [place],  | Results     |
|    |           | PULMONARY  [address]     | Nena GUSMAN MD  |             |
|        |           | [address]  [place], |                |             |
|        |           |  [place] [address]       |                |             |
|        |           | [phone]         |                |             |
+--------+-----------+----------------------+----------------+-------------+
 
 
 
 Social History
 
 
+--------------+-------+-----------+--------+------+
| Tobacco Use  | Types | Packs/Day | Years  | Date |
|              |       |           | Used   |      |
+--------------+-------+-----------+--------+------+
| Never Smoker |       |           |        |      |
+--------------+-------+-----------+--------+------+
 
 
 
+---------------------+---+---+---+
| Smokeless Tobacco:  |   |   |   |
| Never Used          |   |   |   |
+---------------------+---+---+---+
 
 
 
+---------------------------------------------------------------+
| Comments: some second hand smoke exposure, but fairly minimal |
+---------------------------------------------------------------+
 
 
 
 
+-------------+-------------+---------+----------+
| Alcohol Use | Drinks/Week | oz/Week | Comments |
+-------------+-------------+---------+----------+
| No          |             |         |          |
+-------------+-------------+---------+----------+
 
 
 
+------------------+---------------+
| Sex Assigned at  | Date Recorded |
| Birth            |               |
+------------------+---------------+
| Not on file      |               |
+------------------+---------------+
 
 
 
+----------------+-------------+-------------+
| Job Start Date | Occupation  | Industry    |
+----------------+-------------+-------------+
| Not on file    | Not on file | Not on file |
+----------------+-------------+-------------+
 
 
 
+----------------+--------------+------------+
| Travel History | Travel Start | Travel End |
+----------------+--------------+------------+
 
 
 
+-------------------------------------+
| No recent travel history available. |
+-------------------------------------+
 documented as of this encounter
 
 Plan of Treatment
 
 
+--------+-----------+------------+----------------------+-------------------+
| Date   | Type      | Specialty  | Care Team            | Description       |
+--------+-----------+------------+----------------------+-------------------+
| / | Office    | Cardiology |   Tonia Wilson,    |                   |
|    | Visit     |            | ARNP  [address]   |                   |
|        |           |            | [place]  |                   |
|        |           |            | [address]  [phone]  |                   |
|        |           |            |  [phone] (Fax)  |                   |
+--------+-----------+------------+----------------------+-------------------+
| / | Hospital  | Radiology  |   Popeye Townsend, |                   |
2020   | Encounter |            |  MD  [address] |                   |
|        |           |            |  St.  [place],   |                   |
|        |           |            | [place] [address]             |                   |
|        |           |            | [phone]         |                   |
|        |           |            | [phone] (Fax)   |                   |
+--------+-----------+------------+----------------------+-------------------+
| / | Surgery   | Radiology  |   Popeye Townsend, | CV EP PPM SYSTEM  |
|    |           |            |  MD  [address] | IMPLANT           |
 
|        |           |            |  St.  [place],   |                   |
|        |           |            | [place] [address]             |                   |
|        |           |            | [phone]         |                   |
|        |           |            | [phone] (Fax)   |                   |
+--------+-----------+------------+----------------------+-------------------+
| 02/10/ | Clinical  | Cardiology |                      |                   |
2020   | Support   |            |                      |                   |
+--------+-----------+------------+----------------------+-------------------+
| / | Office    | Cardiology |   Tonia Wilson,    |                   |
|    | Visit     |            | ARNP  [address]   |                   |
|        |           |            | St  [place]  |                   |
|        |           |            | [address]  [phone]  |                   |
|        |           |            |  [phone] (Fax)  |                   |
+--------+-----------+------------+----------------------+-------------------+
| / | Off-Site  | Nephrology |   Marzena Moser  |                   |
|    | Visit     |            | DO ETIENNE  [address]      |                   |
|        |           |            | [address]      |                   |
|        |           |            | [place]      |                   |
|        |           |            | [address]  [phone]  |                   |
|        |           |            |  [phone] (Fax)  |                   |
+--------+-----------+------------+----------------------+-------------------+
 
 
 
+----------------+-------------+--------+----------------------+----------------------+
| Name           | Type        | Priori | Associated Diagnoses | Order Schedule       |
|                |             | ty     |                      |                      |
+----------------+-------------+--------+----------------------+----------------------+
| TSH            | Lab         | Routin |   Fatigue            | 1 Occurrences        |
|                |             | e      |                      | starting 2013  |
|                |             |        |                      | until 2014     |
+----------------+-------------+--------+----------------------+----------------------+
| Oxygen Therapy | Respiratory | Routin |   Hypoxemia          | Expected:            |
|                |  Care       | e      |                      | 2013, Expires: |
|                |             |        |                      |  2014          |
+----------------+-------------+--------+----------------------+----------------------+
 documented as of this encounter
 
 Visit Diagnoses
 
 
+------------------------------------------------+
| Diagnosis                                      |
+------------------------------------------------+
|   Fatigue - Primary  Other malaise and fatigue |
+------------------------------------------------+
|   Hypoxemia                                    |
+------------------------------------------------+
 documented in this encounter

## 2020-01-08 NOTE — XMS
Encounter Summary
  Created on: 2020
 
 Viviana Ricci
 External Reference #: [id number]
 : 47
 Sex: Female
 
 Demographics
 
 
+-----------------------+----------------------+
| Address               | [address]      |
|                       | [place]  [address] |
+-----------------------+----------------------+
| Home Phone            | [phone]      |
+-----------------------+----------------------+
| Preferred Language    | Unknown              |
+-----------------------+----------------------+
| Marital Status        | Single               |
+-----------------------+----------------------+
| Muslim Affiliation | [id number]                 |
+-----------------------+----------------------+
| Race                  | Unknown              |
+-----------------------+----------------------+
| Ethnic Group          | Unknown              |
+-----------------------+----------------------+
 
 
 Author
 
 
+--------------+--------------------------------------------+
| Author       | [facility]  |
|              | and [place]                                |
+--------------+--------------------------------------------+
| Organization | [facility]  |
|              | and [place]                                |
+--------------+--------------------------------------------+
| Address      | Unknown                                    |
+--------------+--------------------------------------------+
| Phone        | Unavailable                                |
+--------------+--------------------------------------------+
 
 
 
 Support
 
 
+----------------+--------------+---------------------+-----------------+
| Name           | Relationship | Address             | Phone           |
+----------------+--------------+---------------------+-----------------+
| Ada/Ed Radhames | ECON         | [address]              | [phone] |
|                |              | [place]  |                 |
|                |              |  [address]              |                 |
+----------------+--------------+---------------------+-----------------+
 
 
 
 
 Care Team Providers
 
 
+-----------------------+------+-------------+
| Care Team Member Name | Role | Phone       |
+-----------------------+------+-------------+
 PCP  | Unavailable |
+-----------------------+------+-------------+
 
 
 
 Encounter Details
 
 
+--------+-------------+---------------------+----------------------+-------------+
| Date   | Type        | Department          | Care Team            | Description |
+--------+-------------+---------------------+----------------------+-------------+
| / | Orders Only |   [facility]  [place]         |   Marzena Moser  |             |
|    |             | NEPHROLOGY  [address]   | M, DO  [address]      |             |
|        |             | [address]   | [address]      |             |
|        |             | [place]     | [place]      |             |
|        |             | [address]          | [address]  [phone]  |             |
|        |             | [phone]        |  [phone] (Fax)  |             |
+--------+-------------+---------------------+----------------------+-------------+
 
 
 
 Social History
 
 
+--------------+-------+-----------+--------+------+
| Tobacco Use  | Types | Packs/Day | Years  | Date |
|              |       |           | Used   |      |
+--------------+-------+-----------+--------+------+
| Never Smoker |       |           |        |      |
+--------------+-------+-----------+--------+------+
 
 
 
+---------------------+---+---+---+
| Smokeless Tobacco:  |   |   |   |
| Never Used          |   |   |   |
+---------------------+---+---+---+
 
 
 
+---------------------------------------------------------------+
| Comments: some second hand smoke exposure, but fairly minimal |
+---------------------------------------------------------------+
 
 
 
+-------------+-------------+---------+----------+
| Alcohol Use | Drinks/Week | oz/Week | Comments |
+-------------+-------------+---------+----------+
| No          |             |         |          |
+-------------+-------------+---------+----------+
 
 
 
 
+------------------+---------------+
| Sex Assigned at  | Date Recorded |
| Birth            |               |
+------------------+---------------+
| Not on file      |               |
+------------------+---------------+
 
 
 
+----------------+-------------+-------------+
| Job Start Date | Occupation  | Industry    |
+----------------+-------------+-------------+
| Not on file    | Not on file | Not on file |
+----------------+-------------+-------------+
 
 
 
+----------------+--------------+------------+
| Travel History | Travel Start | Travel End |
+----------------+--------------+------------+
 
 
 
+-------------------------------------+
| No recent travel history available. |
+-------------------------------------+
 documented as of this encounter
 
 Progress Notes
 Bobbi Hanson RN - 2014  3:47 PM PDTPer nuzhat Pryor to refer patient to [facility] in [place] for pain management. [facility] is booking out u
ntil . Patient was called an informed of referral and when they would be able to 
see her. Patient stated she would not be able to wait that long. Patient was informed she co
uld report to the ED or urgent care to be evaluated. Patient stated she would contact a pain
 clinic in [facility] on her own. Electronically signed by Bobbi Hanson RN at 2014
  3:58 PM PDTdocumented in this encounter
 
 Plan of Treatment
 
 
+--------+-----------+------------+----------------------+-------------------+
| Date   | Type      | Specialty  | Care Team            | Description       |
+--------+-----------+------------+----------------------+-------------------+
| / | Office    | Cardiology |   Tonia Wilson,    |                   |
|    | Visit     |            | ARNJESSICA  [address]   |                   |
|        |           |            | St [place], [place]  |                   |
|        |           |            | [address]  [phone]  |                   |
|        |           |            |  [phone] (Fax)  |                   |
+--------+-----------+------------+----------------------+-------------------+
| / | Hospital  | Radiology  |   Popeye Townsend, |                   |
|    | Encounter |            |  MD  [address] |                   |
|        |           |            |  [place],   |                   |
|        |           |            | [place] [address]             |                   |
|        |           |            | [phone]         |                   |
|        |           |            | [phone] (Fax)   |                   |
+--------+-----------+------------+----------------------+-------------------+
| / | Surgery   | Radiology  |   Popeye Townsend, | CV EP PPM SYSTEM  |
|    |           |            |  MD  [address] | IMPLANT           |
|        |           |            |  St.  [place],   |                   |
 
|        |           |            | [place] [address]             |                   |
|        |           |            | [phone]         |                   |
|        |           |            | [phone] (Fax)   |                   |
+--------+-----------+------------+----------------------+-------------------+
| 02/10/ | Clinical  | Cardiology |                      |                   |
|    | Support   |            |                      |                   |
+--------+-----------+------------+----------------------+-------------------+
| / | Office    | Cardiology |   Tonia Wilson,    |                   |
|    | Visit     |            | ARNP  [address]   |                   |
|        |           |            | [place]  |                   |
|        |           |            | [address]  [phone]  |                   |
|        |           |            |  [phone] (Fax)  |                   |
+--------+-----------+------------+----------------------+-------------------+
| / | Off-Site  | Nephrology |   Marzena Moser  |                   |
|    | Visit     |            | DO ETIENNE  [address] [place] [address]      |                   |
|        |           |            | [address]      |                   |
|        |           |            | [place]      |                   |
|        |           |            | [id number] [phone]  |                   |
|        |           |            |  [phone] (Fax)  |                   |
+--------+-----------+------------+----------------------+-------------------+
 documented as of this encounter
 
 Visit Diagnoses
 Not on filedocumented in this encounter

## 2020-01-08 NOTE — XMS
Encounter Summary
  Created on: 2020
 
 Viviana Ricci
 External Reference #: [id number]
 : 47
 Sex: Female
 
 Demographics
 
 
+-----------------------+----------------------+
| Address               | [address]      |
|                       | [place]  [address] |
+-----------------------+----------------------+
| Home Phone            | [phone]      |
+-----------------------+----------------------+
| Preferred Language    | Unknown              |
+-----------------------+----------------------+
| Marital Status        | Single               |
+-----------------------+----------------------+
| Mormonism Affiliation | [id number]                 |
+-----------------------+----------------------+
| Race                  | Unknown              |
+-----------------------+----------------------+
| Ethnic Group          | Unknown              |
+-----------------------+----------------------+
 
 
 Author
 
 
+--------------+--------------------------------------------+
| Author       | [facility]  |
|              | and [place]                                |
+--------------+--------------------------------------------+
| Organization | [facility]  |
|              | and [place]                                |
+--------------+--------------------------------------------+
| Address      | Unknown                                    |
+--------------+--------------------------------------------+
| Phone        | Unavailable                                |
+--------------+--------------------------------------------+
 
 
 
 Support
 
 
+----------------+--------------+---------------------+-----------------+
| Name           | Relationship | Address             | Phone           |
+----------------+--------------+---------------------+-----------------+
| Ada/Ed Radhames | ECON         | [place]              | [phone] |
|                |              | [place]  |                 |
|                |              |  [id number]              |                 |
+----------------+--------------+---------------------+-----------------+
 
 
 
 
 Care Team Providers
 
 
+-----------------------+------+-------------+
| Care Team Member Name | Role | Phone       |
+-----------------------+------+-------------+
 PCP  | Unavailable |
+-----------------------+------+-------------+
 
 
 
 Reason for Visit
 
 
+-------------------+----------+
| Reason            | Comments |
+-------------------+----------+
| Medication Refill |          |
+-------------------+----------+
 
 
 
 Encounter Details
 
 
+--------+--------+---------------------+----------------------+-------------------+
| Date   | Type   | Department          | Care Team            | Description       |
+--------+--------+---------------------+----------------------+-------------------+
| / | Refill |   [facility]         |   Marzena Moser  | Medication Refill |
|    |        | NEPHROLOGY  [address]   | M, DO  [address]      |                   |
|        |        | [address]   | [address]      |                   |
|        |        | [place], [place]     | [place], [place]      |                   |
|        |        | [address]          | [address]  [phone]  |                   |
|        |        | [phone]        |  [phone] (Fax)  |                   |
+--------+--------+---------------------+----------------------+-------------------+
 
 
 
 Social History
 
 
+--------------+-------+-----------+--------+------+
| Tobacco Use  | Types | Packs/Day | Years  | Date |
|              |       |           | Used   |      |
+--------------+-------+-----------+--------+------+
| Never Smoker |       |           |        |      |
+--------------+-------+-----------+--------+------+
 
 
 
+---------------------+---+---+---+
| Smokeless Tobacco:  |   |   |   |
| Never Used          |   |   |   |
+---------------------+---+---+---+
 
 
 
+---------------------------------------------------------------+
| Comments: some second hand smoke exposure, but fairly minimal |
 
+---------------------------------------------------------------+
 
 
 
+-------------+-------------+---------+----------+
| Alcohol Use | Drinks/Week | oz/Week | Comments |
+-------------+-------------+---------+----------+
| No          |             |         |          |
+-------------+-------------+---------+----------+
 
 
 
+------------------+---------------+
| Sex Assigned at  | Date Recorded |
| Birth            |               |
+------------------+---------------+
| Not on file      |               |
+------------------+---------------+
 
 
 
+----------------+-------------+-------------+
| Job Start Date | Occupation  | Industry    |
+----------------+-------------+-------------+
| Not on file    | Not on file | Not on file |
+----------------+-------------+-------------+
 
 
 
+----------------+--------------+------------+
| Travel History | Travel Start | Travel End |
+----------------+--------------+------------+
 
 
 
+-------------------------------------+
| No recent travel history available. |
+-------------------------------------+
 documented as of this encounter
 
 Plan of Treatment
 
 
+--------+-----------+------------+----------------------+-------------------+
| Date   | Type      | Specialty  | Care Team            | Description       |
+--------+-----------+------------+----------------------+-------------------+
| / | Office    | Cardiology |   HellalfredoTonia,    |                   |
|    | Visit     |            | ARNP  [address]   |                   |
|        |           |            | St  [place], [place]  |                   |
|        |           |            | [address]  [phone]  |                   |
|        |           |            |  [phone] (Fax)  |                   |
+--------+-----------+------------+----------------------+-------------------+
| / | Hospital  | Radiology  |   Popeye Townsend, |                   |
|    | Encounter |            |  MD  [address] |                   |
|        |           |            |  St.  [place],   |                   |
|        |           |            | [place] [address]             |                   |
|        |           |            | [phone]         |                   |
|        |           |            | [phone] (Fax)   |                   |
+--------+-----------+------------+----------------------+-------------------+
| / | Surgery   | Radiology  |   Popeye Townsend, | CV EP PPM SYSTEM  |
 
|    |           |            |  MD  [address] | IMPLANT           |
|        |           |            |  St.  [place],   |                   |
|        |           |            | [place] [address]             |                   |
|        |           |            | [phone]         |                   |
|        |           |            | [phone] (Fax)   |                   |
+--------+-----------+------------+----------------------+-------------------+
| 02/10/ | Clinical  | Cardiology |                      |                   |
|    | Support   |            |                      |                   |
+--------+-----------+------------+----------------------+-------------------+
| / | Office    | Cardiology |   Tonia Wilson,    |                   |
|    | Visit     |            | ARNP  [address]   |                   |
|        |           |            | [place]  |                   |
|        |           |            | [address]  [phone]  |                   |
|        |           |            |  [phone] (Fax)  |                   |
+--------+-----------+------------+----------------------+-------------------+
| / | Off-Site  | Nephrology |   Marzena Moser  |                   |
|    | Visit     |            | DO ETIENNE  [address] [place] [address]      |                   |
|        |           |            | [address]      |                   |
|        |           |            | [place]      |                   |
|        |           |            | [address]  [phone]  |                   |
|        |           |            |  [phone] (Fax)  |                   |
+--------+-----------+------------+----------------------+-------------------+
 documented as of this encounter
 
 Visit Diagnoses
 Not on filedocumented in this encounter

## 2020-01-08 NOTE — XMS
Encounter Summary
  Created on: 2020
 
 Viviana Ricci
 External Reference #: [id number]
 : 47
 Sex: Female
 
 Demographics
 
 
+-----------------------+----------------------+
| Address               | [address]      |
|                       | [place]  [address] |
+-----------------------+----------------------+
| Home Phone            | [phone]      |
+-----------------------+----------------------+
| Preferred Language    | Unknown              |
+-----------------------+----------------------+
| Marital Status        | Single               |
+-----------------------+----------------------+
| Denominational Affiliation | [id number]                 |
+-----------------------+----------------------+
| Race                  | Unknown              |
+-----------------------+----------------------+
| Ethnic Group          | Unknown              |
+-----------------------+----------------------+
 
 
 Author
 
 
+--------------+--------------------------------------------+
| Author       | [facility]  |
|              | and [place]                                |
+--------------+--------------------------------------------+
| Organization | [facility]  |
|              | and [place]                                |
+--------------+--------------------------------------------+
| Address      | Unknown                                    |
+--------------+--------------------------------------------+
| Phone        | Unavailable                                |
+--------------+--------------------------------------------+
 
 
 
 Support
 
 
+----------------+--------------+---------------------+-----------------+
| Name           | Relationship | Address             | Phone           |
+----------------+--------------+---------------------+-----------------+
| Ada/Ed Radhames | ECON         | [place]              | [phone] |
|                |              | [place]  |                 |
|                |              |  [address]              |                 |
+----------------+--------------+---------------------+-----------------+
 
 
 
 
 Care Team Providers
 
 
+-----------------------+------+-------------+
| Care Team Member Name | Role | Phone       |
+-----------------------+------+-------------+
 PCP  | Unavailable |
+-----------------------+------+-------------+
 
 
 
 Reason for Visit
 
 
+-------------------+----------+
| Reason            | Comments |
+-------------------+----------+
| Medication Refill |          |
+-------------------+----------+
 
 
 
 Encounter Details
 
 
+--------+--------+---------------------+----------------------+-------------------+
| Date   | Type   | Department          | Care Team            | Description       |
+--------+--------+---------------------+----------------------+-------------------+
| 10/06/ | Refill |   [facility]         |   Marzena Moser  | Medication Refill |
|    |        | NEPHROLOGY  [address]   | M, DO  [address]      |                   |
|        |        | [address]   | [address]      |                   |
|        |        | [place], [place]     | [place], [place]      |                   |
|        |        | [address]          | [address]  [phone]  |                   |
|        |        | [phone]        |  [phone] (Fax)  |                   |
+--------+--------+---------------------+----------------------+-------------------+
 
 
 
 Social History
 
 
+--------------+-------+-----------+--------+------+
| Tobacco Use  | Types | Packs/Day | Years  | Date |
|              |       |           | Used   |      |
+--------------+-------+-----------+--------+------+
| Never Smoker |       |           |        |      |
+--------------+-------+-----------+--------+------+
 
 
 
+---------------------+---+---+---+
| Smokeless Tobacco:  |   |   |   |
| Never Used          |   |   |   |
+---------------------+---+---+---+
 
 
 
+---------------------------------------------------------------+
| Comments: some second hand smoke exposure, but fairly minimal |
 
+---------------------------------------------------------------+
 
 
 
+-------------+-------------+---------+----------+
| Alcohol Use | Drinks/Week | oz/Week | Comments |
+-------------+-------------+---------+----------+
| No          |             |         |          |
+-------------+-------------+---------+----------+
 
 
 
+------------------+---------------+
| Sex Assigned at  | Date Recorded |
| Birth            |               |
+------------------+---------------+
| Not on file      |               |
+------------------+---------------+
 
 
 
+----------------+-------------+-------------+
| Job Start Date | Occupation  | Industry    |
+----------------+-------------+-------------+
| Not on file    | Not on file | Not on file |
+----------------+-------------+-------------+
 
 
 
+----------------+--------------+------------+
| Travel History | Travel Start | Travel End |
+----------------+--------------+------------+
 
 
 
+-------------------------------------+
| No recent travel history available. |
+-------------------------------------+
 documented as of this encounter
 
 Plan of Treatment
 
 
+--------+-----------+------------+----------------------+-------------------+
| Date   | Type      | Specialty  | Care Team            | Description       |
+--------+-----------+------------+----------------------+-------------------+
| / | Office    | Cardiology |   HellalfredoTonia,    |                   |
|    | Visit     |            | ARNP  [address]   |                   |
|        |           |            | St  [place], [place]  |                   |
|        |           |            | [address]  [phone]  |                   |
|        |           |            |  [phone] (Fax)  |                   |
+--------+-----------+------------+----------------------+-------------------+
| / | Hospital  | Radiology  |   Popeye Townsend, |                   |
|    | Encounter |            |  MD  [address] |                   |
|        |           |            |  St.  [place],   |                   |
|        |           |            | [place] [address]             |                   |
|        |           |            | [phone]         |                   |
|        |           |            | [phone] (Fax)   |                   |
+--------+-----------+------------+----------------------+-------------------+
| / | Surgery   | Radiology  |   Popeye Townsend, | CV EP PPM SYSTEM  |
 
|    |           |            |  MD  [address] | IMPLANT           |
|        |           |            |  St.  [place],   |                   |
|        |           |            | [place] [address]             |                   |
|        |           |            | [phone]         |                   |
|        |           |            | [phone] (Fax)   |                   |
+--------+-----------+------------+----------------------+-------------------+
| 02/10/ | Clinical  | Cardiology |                      |                   |
|    | Support   |            |                      |                   |
+--------+-----------+------------+----------------------+-------------------+
| / | Office    | Cardiology |   Tonia Wilson,    |                   |
|    | Visit     |            | ARNP  [address]   |                   |
|        |           |            | [place]  |                   |
|        |           |            | [address]  [phone]  |                   |
|        |           |            |  [phone] (Fax)  |                   |
+--------+-----------+------------+----------------------+-------------------+
| / | Off-Site  | Nephrology |   Marzena Moser  |                   |
|    | Visit     |            | DO ETIENNE  [address] [place] [address]      |                   |
|        |           |            | [address]      |                   |
|        |           |            | [place]      |                   |
|        |           |            | [address]  [phone]  |                   |
|        |           |            |  [phone] (Fax)  |                   |
+--------+-----------+------------+----------------------+-------------------+
 documented as of this encounter
 
 Visit Diagnoses
 Not on filedocumented in this encounter

## 2020-01-08 NOTE — XMS
Encounter Summary
  Created on: 2020
 
 Viviana Ricci
 External Reference #: [id number]
 : 47
 Sex: Female
 
 Demographics
 
 
+-----------------------+----------------------+
| Address               | [address]      |
|                       | [place]  [address] |
+-----------------------+----------------------+
| Home Phone            | [phone]      |
+-----------------------+----------------------+
| Preferred Language    | Unknown              |
+-----------------------+----------------------+
| Marital Status        | Single               |
+-----------------------+----------------------+
| Jainism Affiliation | [id number]                 |
+-----------------------+----------------------+
| Race                  | Unknown              |
+-----------------------+----------------------+
| Ethnic Group          | Unknown              |
+-----------------------+----------------------+
 
 
 Author
 
 
+--------------+--------------------------------------------+
| Author       | [facility]  |
|              | and [place]                                |
+--------------+--------------------------------------------+
| Organization | [facility]  |
|              | and [place]                                |
+--------------+--------------------------------------------+
| Address      | Unknown                                    |
+--------------+--------------------------------------------+
| Phone        | Unavailable                                |
+--------------+--------------------------------------------+
 
 
 
 Support
 
 
+----------------+--------------+---------------------+-----------------+
| Name           | Relationship | Address             | Phone           |
+----------------+--------------+---------------------+-----------------+
| Ada/Ed Radhames | ECON         | [address]              | [phone] |
|                |              | [place]  |                 |
|                |              |  [address]              |                 |
+----------------+--------------+---------------------+-----------------+
 
 
 
 
 Care Team Providers
 
 
+-----------------------+------+-------------+
| Care Team Member Name | Role | Phone       |
+-----------------------+------+-------------+
 PCP  | Unavailable |
+-----------------------+------+-------------+
 
 
 
 Reason for Visit
 
 
+-------------------+----------+
| Reason            | Comments |
+-------------------+----------+
| Medication Refill |          |
+-------------------+----------+
 
 
 
 Encounter Details
 
 
+--------+--------+---------------------+----------------------+-------------------+
| Date   | Type   | Department          | Care Team            | Description       |
+--------+--------+---------------------+----------------------+-------------------+
| / | Refill |   [facility]         |   Marzena Moser  | Medication Refill |
| 2017   |        | NEPHROLOGY  [address]   | M, DO  [address]      |                   |
|        |        | [address]   | [address]      |                   |
|        |        | [place], [place]     | [place], [place]      |                   |
|        |        | [address]          | [address]  [phone]  |                   |
|        |        | [phone]        |  [phone] (Fax)  |                   |
+--------+--------+---------------------+----------------------+-------------------+
 
 
 
 Social History
 
 
+--------------+-------+-----------+--------+------+
| Tobacco Use  | Types | Packs/Day | Years  | Date |
|              |       |           | Used   |      |
+--------------+-------+-----------+--------+------+
| Never Smoker |       |           |        |      |
+--------------+-------+-----------+--------+------+
 
 
 
+---------------------+---+---+---+
| Smokeless Tobacco:  |   |   |   |
| Never Used          |   |   |   |
+---------------------+---+---+---+
 
 
 
+---------------------------------------------------------------+
| Comments: some second hand smoke exposure, but fairly minimal |
 
+---------------------------------------------------------------+
 
 
 
+-------------+-------------+---------+----------+
| Alcohol Use | Drinks/Week | oz/Week | Comments |
+-------------+-------------+---------+----------+
| No          |             |         |          |
+-------------+-------------+---------+----------+
 
 
 
+------------------+---------------+
| Sex Assigned at  | Date Recorded |
| Birth            |               |
+------------------+---------------+
| Not on file      |               |
+------------------+---------------+
 
 
 
+----------------+-------------+-------------+
| Job Start Date | Occupation  | Industry    |
+----------------+-------------+-------------+
| Not on file    | Not on file | Not on file |
+----------------+-------------+-------------+
 
 
 
+----------------+--------------+------------+
| Travel History | Travel Start | Travel End |
+----------------+--------------+------------+
 
 
 
+-------------------------------------+
| No recent travel history available. |
+-------------------------------------+
 documented as of this encounter
 
 Plan of Treatment
 
 
+--------+-----------+------------+----------------------+-------------------+
| Date   | Type      | Specialty  | Care Team            | Description       |
+--------+-----------+------------+----------------------+-------------------+
| / | Office    | Cardiology |   HellalfredoTonia,    |                   |
|    | Visit     |            | ARNP  [address]   |                   |
|        |           |            | St  [place], [place]  |                   |
|        |           |            | [address]  [phone]  |                   |
|        |           |            |  [phone] (Fax)  |                   |
+--------+-----------+------------+----------------------+-------------------+
| / | Hospital  | Radiology  |   Popeye Townsend, |                   |
|    | Encounter |            |  MD  [address] |                   |
|        |           |            |  St.  [place],   |                   |
|        |           |            | [place] [address]             |                   |
|        |           |            | [phone]         |                   |
|        |           |            | [phone] (Fax)   |                   |
+--------+-----------+------------+----------------------+-------------------+
| / | Surgery   | Radiology  |   Popeye Townsend, | CV EP PPM SYSTEM  |
 
|    |           |            |  MD  [address] | IMPLANT           |
|        |           |            |  St.  [place],   |                   |
|        |           |            | [place] [address]             |                   |
|        |           |            | [phone]         |                   |
|        |           |            | [phone] (Fax)   |                   |
+--------+-----------+------------+----------------------+-------------------+
| 02/10/ | Clinical  | Cardiology |                      |                   |
|    | Support   |            |                      |                   |
+--------+-----------+------------+----------------------+-------------------+
| / | Office    | Cardiology |   Tonia Wilson,    |                   |
|    | Visit     |            | ARNP  [address]   |                   |
|        |           |            | [place]  |                   |
|        |           |            | [address]  [phone]  |                   |
|        |           |            |  [phone] (Fax)  |                   |
+--------+-----------+------------+----------------------+-------------------+
| / | Off-Site  | Nephrology |   Marzena Moser  |                   |
|    | Visit     |            | DO ETIENNE  [address] [place] [address]      |                   |
|        |           |            | [address]      |                   |
|        |           |            | [place]      |                   |
|        |           |            | [address]  [phone]  |                   |
|        |           |            |  [phone] (Fax)  |                   |
+--------+-----------+------------+----------------------+-------------------+
 documented as of this encounter
 
 Visit Diagnoses
 Not on filedocumented in this encounter

## 2020-01-08 NOTE — XMS
Encounter Summary
  Created on: 2020
 
 Viviana Ricci
 External Reference #: [id number]
 : 47
 Sex: Female
 
 Demographics
 
 
+-----------------------+----------------------+
| Address               | [address]      |
|                       | [place]  [address] |
+-----------------------+----------------------+
| Home Phone            | [phone]      |
+-----------------------+----------------------+
| Preferred Language    | Unknown              |
+-----------------------+----------------------+
| Marital Status        | Single               |
+-----------------------+----------------------+
| Anglican Affiliation | [id number]                 |
+-----------------------+----------------------+
| Race                  | Unknown              |
+-----------------------+----------------------+
| Ethnic Group          | Unknown              |
+-----------------------+----------------------+
 
 
 Author
 
 
+--------------+--------------------------------------------+
| Author       | [facility]  |
|              | and [place]                                |
+--------------+--------------------------------------------+
| Organization | [facility]  |
|              | and [place]                                |
+--------------+--------------------------------------------+
| Address      | Unknown                                    |
+--------------+--------------------------------------------+
| Phone        | Unavailable                                |
+--------------+--------------------------------------------+
 
 
 
 Support
 
 
+----------------+--------------+---------------------+-----------------+
| Name           | Relationship | Address             | Phone           |
+----------------+--------------+---------------------+-----------------+
| Ada/Ed Radhames | ECON         | [address]              | [phone] |
|                |              | [place], [place]  |                 |
|                |              |  [address]              |                 |
+----------------+--------------+---------------------+-----------------+
 
 
 
 
 Care Team Providers
 
 
+-----------------------+------+-------------+
| Care Team Member Name | Role | Phone       |
+-----------------------+------+-------------+
 PCP  | Unavailable |
+-----------------------+------+-------------+
 
 
 
 Encounter Details
 
 
+--------+-------------+---------------------+----------------------+-------------+
| Date   | Type        | Department          | Care Team            | Description |
+--------+-------------+---------------------+----------------------+-------------+
| 10/21/ | Orders Only |   [facility] [place]         |   Bobbi Hanson,  |             |
|    |             | NEPHROLOGY  [address]   | RN                   |             |
|        |             | [address]   |                      |             |
|        |             | [place], [place]     |                      |             |
|        |             | [address]          |                      |             |
|        |             | [phone]        |                      |             |
+--------+-------------+---------------------+----------------------+-------------+
 
 
 
 Social History
 
 
+--------------+-------+-----------+--------+------+
| Tobacco Use  | Types | Packs/Day | Years  | Date |
|              |       |           | Used   |      |
+--------------+-------+-----------+--------+------+
| Never Smoker |       |           |        |      |
+--------------+-------+-----------+--------+------+
 
 
 
+---------------------+---+---+---+
| Smokeless Tobacco:  |   |   |   |
| Never Used          |   |   |   |
+---------------------+---+---+---+
 
 
 
+---------------------------------------------------------------+
| Comments: some second hand smoke exposure, but fairly minimal |
+---------------------------------------------------------------+
 
 
 
+-------------+-------------+---------+----------+
| Alcohol Use | Drinks/Week | oz/Week | Comments |
+-------------+-------------+---------+----------+
| No          |             |         |          |
+-------------+-------------+---------+----------+
 
 
 
 
+------------------+---------------+
| Sex Assigned at  | Date Recorded |
| Birth            |               |
+------------------+---------------+
| Not on file      |               |
+------------------+---------------+
 
 
 
+----------------+-------------+-------------+
| Job Start Date | Occupation  | Industry    |
+----------------+-------------+-------------+
| Not on file    | Not on file | Not on file |
+----------------+-------------+-------------+
 
 
 
+----------------+--------------+------------+
| Travel History | Travel Start | Travel End |
+----------------+--------------+------------+
 
 
 
+-------------------------------------+
| No recent travel history available. |
+-------------------------------------+
 documented as of this encounter
 
 Progress Notes
 Bobbi Hanson RN - 10/21/2015  2:35 PM PDTPatient denies fever or chills. Per Dr. Geeta mak, no antibiotics at this time. Pt to Call office if fever/chills/NV or dysuria - pt laureen torres expressed a clear understanding. Electronically signed by Bobbi Hanson RN at 10/21/2
015  2:37 PM PDTdocumented in this encounter
 
 Plan of Treatment
 
 
+--------+-----------+------------+----------------------+-------------------+
| Date   | Type      | Specialty  | Care Team            | Description       |
+--------+-----------+------------+----------------------+-------------------+
| / | Office    | Cardiology |   Tonia Wilson,    |                   |
|    | Visit     |            | BELKIS  [address]   |                   |
|        |           |            |   [facility] [place]  |                   |
|        |           |            | [address]  [phone]  |                   |
|        |           |            |  [phone] (Fax)  |                   |
+--------+-----------+------------+----------------------+-------------------+
| / | Hospital  | Radiology  |   Popeye Townsend, |                   |
|    | Encounter |            |  MD  [address] |                   |
|        |           |            |  St.  [place],   |                   |
|        |           |            | [place] [address]             |                   |
|        |           |            | [phone]         |                   |
|        |           |            | [phone] (Fax)   |                   |
+--------+-----------+------------+----------------------+-------------------+
| / | Surgery   | Radiology  |   Popeye Townsend, | CV EP PPM SYSTEM  |
|    |           |            |  MD  [address] | IMPLANT           |
|        |           |            |  St.  [place],   |                   |
|        |           |            | [place] [address]             |                   |
|        |           |            | [phone]         |                   |
|        |           |            | [phone] (Fax)   |                   |
 
+--------+-----------+------------+----------------------+-------------------+
| 02/10/ | Clinical  | Cardiology |                      |                   |
|    | Support   |            |                      |                   |
+--------+-----------+------------+----------------------+-------------------+
| / | Office    | Cardiology |   Tonia Wilson,    |                   |
|    | Visit     |            | BELKIS  [address]   |                   |
|        |           |            | [place]  |                   |
|        |           |            | [address]  [phone]  |                   |
|        |           |            |  [phone] (Fax)  |                   |
+--------+-----------+------------+----------------------+-------------------+
| / | Off-Site  | Nephrology |   Marzena Moser  |                   |
|    | Visit     |            | DO ETIENNE  [address] [place] [address]      |                   |
|        |           |            | [address]      |                   |
|        |           |            | [place]      |                   |
|        |           |            | [address]  [phone]  |                   |
|        |           |            |  [phone] (Fax)  |                   |
+--------+-----------+------------+----------------------+-------------------+
 documented as of this encounter
 
 Visit Diagnoses
 Not on filedocumented in this encounter

## 2020-01-08 NOTE — XMS
Encounter Summary
  Created on: 2020
 
 Viviana Ricci
 External Reference #: [id number]
 : 47
 Sex: Female
 
 Demographics
 
 
+-----------------------+----------------------+
| Address               | [address]      |
|                       | [place]  [address] |
+-----------------------+----------------------+
| Home Phone            | [phone]      |
+-----------------------+----------------------+
| Preferred Language    | Unknown              |
+-----------------------+----------------------+
| Marital Status        | Single               |
+-----------------------+----------------------+
| Confucianist Affiliation | [id number]                 |
+-----------------------+----------------------+
| Race                  | Unknown              |
+-----------------------+----------------------+
| Ethnic Group          | Unknown              |
+-----------------------+----------------------+
 
 
 Author
 
 
+--------------+--------------------------------------------+
| Author       | [facility]  |
|              | and [place]                                |
+--------------+--------------------------------------------+
| Organization | [facility]  |
|              | and [place]                                |
+--------------+--------------------------------------------+
| Address      | Unknown                                    |
+--------------+--------------------------------------------+
| Phone        | Unavailable                                |
+--------------+--------------------------------------------+
 
 
 
 Support
 
 
+----------------+--------------+---------------------+-----------------+
| Name           | Relationship | Address             | Phone           |
+----------------+--------------+---------------------+-----------------+
| Ada/Ed Radhames | ECON         | [address]              | [phone] |
|                |              | [place]  |                 |
|                |              |  [address]              |                 |
+----------------+--------------+---------------------+-----------------+
 
 
 
 
 Care Team Providers
 
 
+-----------------------+------+-------------+
| Care Team Member Name | Role | Phone       |
+-----------------------+------+-------------+
 PCP  | Unavailable |
+-----------------------+------+-------------+
 
 
 
 Reason for Visit
 
 
+--------------------+------------+
| Reason             | Comments   |
+--------------------+------------+
| Medication Problem | Tacrolimus |
+--------------------+------------+
 
 
 
 Encounter Details
 
 
+--------+--------+---------------------+----------------------+---------------------+
| Date   | Type   | Department          | Care Team            | Description         |
+--------+--------+---------------------+----------------------+---------------------+
| 10/11/ | Refill |   [facility]         |   Shanti Marzena  | Medication Problem  |
|    |        | NEPHROLOGY  [address]   | M, DO  [address]      | (Tacrolimus)        |
|        |        | [address]   | [address]      |                     |
|        |        | [place], [place]     | [place], [place]      |                     |
|        |        | [address]          | [address]  [phone]  |                     |
|        |        | [phone]        |  [phone] (Fax)  |                     |
+--------+--------+---------------------+----------------------+---------------------+
 
 
 
 Social History
 
 
+----------------+-------+-----------+--------+------+
| Tobacco Use    | Types | Packs/Day | Years  | Date |
|                |       |           | Used   |      |
+----------------+-------+-----------+--------+------+
| Never Assessed |       |           |        |      |
+----------------+-------+-----------+--------+------+
 
 
 
+------------------+---------------+
| Sex Assigned at  | Date Recorded |
| Birth            |               |
+------------------+---------------+
| Not on file      |               |
+------------------+---------------+
 
 
 
 
+----------------+-------------+-------------+
| Job Start Date | Occupation  | Industry    |
+----------------+-------------+-------------+
| Not on file    | Not on file | Not on file |
+----------------+-------------+-------------+
 
 
 
+----------------+--------------+------------+
| Travel History | Travel Start | Travel End |
+----------------+--------------+------------+
 
 
 
+-------------------------------------+
| No recent travel history available. |
+-------------------------------------+
 documented as of this encounter
 
 Plan of Treatment
 
 
+--------+-----------+------------+----------------------+-------------------+
| Date   | Type      | Specialty  | Care Team            | Description       |
+--------+-----------+------------+----------------------+-------------------+
| / | Office    | Cardiology |   Tonia Wilson,    |                   |
|    | Visit     |            | ARNP  [address]   |                   |
|        |           |            | [place]  |                   |
|        |           |            | [address]  [phone]  |                   |
|        |           |            |  [phone] (Fax)  |                   |
+--------+-----------+------------+----------------------+-------------------+
| / | Hospital  | Radiology  |   Popeye Townsend, |                   |
|    | Encounter |            |  MD  [address] |                   |
|        |           |            |  [place],   |                   |
|        |           |            | [place] [address]             |                   |
|        |           |            | [phone]         |                   |
|        |           |            | [phone] (Fax)   |                   |
+--------+-----------+------------+----------------------+-------------------+
| / | Surgery   | Radiology  |   Popeye Townsend, | CV EP PPM SYSTEM  |
|    |           |            |  MD  [address] | IMPLANT           |
|        |           |            |  St.  [place],   |                   |
|        |           |            | [place] [address]             |                   |
|        |           |            | [phone]         |                   |
|        |           |            | [phone] (Fax)   |                   |
+--------+-----------+------------+----------------------+-------------------+
| 02/10/ | Clinical  | Cardiology |                      |                   |
|    | Support   |            |                      |                   |
+--------+-----------+------------+----------------------+-------------------+
| / | Office    | Cardiology |   Tonia Wilson,    |                   |
|    | Visit     |            | ARNP  [address]   |                   |
|        |           |            | [place]  |                   |
|        |           |            | [address]  [phone]  |                   |
|        |           |            |  [phone] (Fax)  |                   |
+--------+-----------+------------+----------------------+-------------------+
| / | Off-Site  | Nephrology |   Marzena Moser  |                   |
|    | Visit     |            | DO [address] [place]      |                   |
|        |           |            | [address]      |                   |
|        |           |            | [place]      |                   |
|        |           |            | [address]  [phone]  |                   |
|        |           |            |  [phone] (Fax)  |                   |
 
+--------+-----------+------------+----------------------+-------------------+
 documented as of this encounter
 
 Visit Diagnoses
 Not on filedocumented in this encounter

## 2020-01-08 NOTE — XMS
Encounter Summary
  Created on: 2020
 
 Viviana Ricci
 External Reference #: [id number]
 : 47
 Sex: Female
 
 Demographics
 
 
+-----------------------+----------------------+
| Address               | [address]      |
|                       | [place]  [address] |
+-----------------------+----------------------+
| Home Phone            | [phone]      |
+-----------------------+----------------------+
| Preferred Language    | Unknown              |
+-----------------------+----------------------+
| Marital Status        | Single               |
+-----------------------+----------------------+
| Moravian Affiliation | [id number]                 |
+-----------------------+----------------------+
| Race                  | Unknown              |
+-----------------------+----------------------+
| Ethnic Group          | Unknown              |
+-----------------------+----------------------+
 
 
 Author
 
 
+--------------+--------------------------------------------+
| Author       | [facility]  |
|              | and [place]                                |
+--------------+--------------------------------------------+
| Organization | [facility]  |
|              | and [place]                                |
+--------------+--------------------------------------------+
| Address      | Unknown                                    |
+--------------+--------------------------------------------+
| Phone        | Unavailable                                |
+--------------+--------------------------------------------+
 
 
 
 Support
 
 
+----------------+--------------+---------------------+-----------------+
| Name           | Relationship | Address             | Phone           |
+----------------+--------------+---------------------+-----------------+
| Ada/Ed Radhames | ECON         | [address]              | [phone] |
|                |              | [place]  |                 |
|                |              |  [address]              |                 |
+----------------+--------------+---------------------+-----------------+
 
 
 
 
 Care Team Providers
 
 
+------------------------+------+-----------------+
| Care Team Member Name  | Role | Phone           |
+------------------------+------+-----------------+
| Marzena Moser DO | PCP  | [phone] |
+------------------------+------+-----------------+
 
 
 
 Encounter Details
 
 
+--------+----------+---------------------+----------------------+-------------+
| Date   | Type     | Department          | Care Team            | Description |
+--------+----------+---------------------+----------------------+-------------+
| / | Abstract |   [facility]         |   Marzena Moser  |             |
|    |          | NEPHROLOGY  [address]   | DO ETIENNE  [address]      |             |
|        |          | [address]   | [address]      |             |
|        |          | [place], [place]     | [place], [place]      |             |
|        |          | [address]          | [address]  [phone]  |             |
|        |          | [phone]        |  [phone] (Fax)  |             |
+--------+----------+---------------------+----------------------+-------------+
 
 
 
 Social History
 
 
+--------------+-------+-----------+--------+------+
| Tobacco Use  | Types | Packs/Day | Years  | Date |
|              |       |           | Used   |      |
+--------------+-------+-----------+--------+------+
| Never Smoker |       |           |        |      |
+--------------+-------+-----------+--------+------+
 
 
 
+---------------------+---+---+---+
| Smokeless Tobacco:  |   |   |   |
| Never Used          |   |   |   |
+---------------------+---+---+---+
 
 
 
+---------------------------------------------------------------+
| Comments: some second hand smoke exposure, but fairly minimal |
+---------------------------------------------------------------+
 
 
 
+-------------+-------------+---------+----------+
| Alcohol Use | Drinks/Week | oz/Week | Comments |
+-------------+-------------+---------+----------+
| No          |             |         |          |
+-------------+-------------+---------+----------+
 
 
 
 
+------------------+---------------+
| Sex Assigned at  | Date Recorded |
| Birth            |               |
+------------------+---------------+
| Not on file      |               |
+------------------+---------------+
 
 
 
+----------------+-------------+-------------+
| Job Start Date | Occupation  | Industry    |
+----------------+-------------+-------------+
| Not on file    | Not on file | Not on file |
+----------------+-------------+-------------+
 
 
 
+----------------+--------------+------------+
| Travel History | Travel Start | Travel End |
+----------------+--------------+------------+
 
 
 
+-------------------------------------+
| No recent travel history available. |
+-------------------------------------+
 documented as of this encounter
 
 Plan of Treatment
 
 
+--------+-----------+------------+----------------------+-------------------+
| Date   | Type      | Specialty  | Care Team            | Description       |
+--------+-----------+------------+----------------------+-------------------+
| / | Office    | Cardiology |   Tonia Wilson,    |                   |
|    | Visit     |            | ARNP  [address]   |                   |
|        |           |            | St  [place] [facility] [place]  |                   |
|        |           |            | [address]  [phone]  |                   |
|        |           |            |  [phone] (Fax)  |                   |
+--------+-----------+------------+----------------------+-------------------+
| / | Hospital  | Radiology  |   Popeye Townsend, |                   |
|    | Encounter |            |  MD  [address] |                   |
|        |           |            |  St.  [place],   |                   |
|        |           |            | [place] [address]             |                   |
|        |           |            | [phone]         |                   |
|        |           |            | [phone] (Fax)   |                   |
+--------+-----------+------------+----------------------+-------------------+
| / | Surgery   | Radiology  |   Popeye Townsend, | CV EP PPM SYSTEM  |
|    |           |            |  MD  [address] | IMPLANT           |
|        |           |            |  St.  [place],   |                   |
|        |           |            | [place] [address]             |                   |
|        |           |            | [phone]         |                   |
|        |           |            | [phone] (Fax)   |                   |
+--------+-----------+------------+----------------------+-------------------+
| 02/10/ | Clinical  | Cardiology |                      |                   |
2020   | Support   |            |                      |                   |
+--------+-----------+------------+----------------------+-------------------+
| / | Office    | Cardiology |   Tonia Wilson,    |                   |
|    | Visit     |            | [facility]  [address]   |                   |
 
|        |           |            |   [place]  |                   |
|        |           |            | [address]  [phone]  |                   |
|        |           |            |  [phone] (Fax)  |                   |
+--------+-----------+------------+----------------------+-------------------+
| / | Off-Site  | Nephrology |   Marzena Moser  |                   |
2020   | Visit     |            | DO ETIENNE  [address]      |                   |
|        |           |            | [address]      |                   |
|        |           |            | [place]      |                   |
|        |           |            | [address]  [phone]  |                   |
|        |           |            |  [phone] (Fax)  |                   |
+--------+-----------+------------+----------------------+-------------------+
 documented as of this encounter
 
 Procedures
 
 
+--------------------+--------+------------+----------------------+----------------------+
| Procedure Name     | Priori | Date/Time  | Associated Diagnosis | Comments             |
|                    | ty     |            |                      |                      |
+--------------------+--------+------------+----------------------+----------------------+
| EXTERNAL LAB:      | Routin | 2018 |                      |   Results for this   |
| TACROLIMUS LEVEL,  | e      |            |                      | procedure are in the |
| [facility]               |        |            |                      |  results section.    |
+--------------------+--------+------------+----------------------+----------------------+
 documented in this encounter
 
 Results
 External Lab: Tacrolimus Level, [facility] (2018)
 
+-------------+-------+-----------+------------+--------------+
| Component   | Value | Ref Range | Performed  | Pathologist  |
|             |       |           | At         | Signature    |
+-------------+-------+-----------+------------+--------------+
| Tacrolimus, | 3.5   |           |            |              |
|  [facility],      |       |           |            |              |
| External    |       |           |            |              |
+-------------+-------+-----------+------------+--------------+
 
 
 
+----------+
| Specimen |
+----------+
|          |
+----------+
 documented in this encounter
 
 Visit Diagnoses
 Not on filedocumented in this encounter

## 2020-01-08 NOTE — XMS
Encounter Summary
  Created on: 2020
 
 Viviana Ricci
 External Reference #: [id number]
 : 47
 Sex: Female
 
 Demographics
 
 
+-----------------------+----------------------+
| Address               | [address]      |
|                       | [place]  [address] |
+-----------------------+----------------------+
| Home Phone            | [phone]      |
+-----------------------+----------------------+
| Preferred Language    | Unknown              |
+-----------------------+----------------------+
| Marital Status        | Single               |
+-----------------------+----------------------+
| Holiness Affiliation | [id number]                 |
+-----------------------+----------------------+
| Race                  | Unknown              |
+-----------------------+----------------------+
| Ethnic Group          | Unknown              |
+-----------------------+----------------------+
 
 
 Author
 
 
+--------------+--------------------------------------------+
| Author       | [facility]  |
|              | and [facility]                                |
+--------------+--------------------------------------------+
| Organization | [facility]  |
|              | and [facility]                                |
+--------------+--------------------------------------------+
| Address      | Unknown                                    |
+--------------+--------------------------------------------+
| Phone        | Unavailable                                |
+--------------+--------------------------------------------+
 
 
 
 Support
 
 
+----------------+--------------+---------------------+-----------------+
| Name           | Relationship | Address             | Phone           |
+----------------+--------------+---------------------+-----------------+
| Ada/Ed Radhames | ECON         | [address]              | [phone] |
|                |              | [place]  |                 |
|                |              |  [address]              |                 |
+----------------+--------------+---------------------+-----------------+
 
 
 
 
 Care Team Providers
 
 
+------------------------+------+-----------------+
| Care Team Member Name  | Role | Phone           |
+------------------------+------+-----------------+
| Marzena Moser DO | PCP  | [phone] |
+------------------------+------+-----------------+
 
 
 
 Encounter Details
 
 
+--------+-------------+---------------------+----------------------+---------------------+
| Date   | Type        | Department          | Care Team            | Description         |
+--------+-------------+---------------------+----------------------+---------------------+
| / | Orders Only |   [facility]         |   KimAnu W, | Abnormal mammogram  |
| 2019   |             | NEPHROLOGY  [address]   |  MD  [address]    | of left breast      |
|        |             | [address]   | [address]  [place]       | (Primary Dx)        |
|        |             | [place], [place]     | [place] [address]      |                     |
|        |             | [address]          | [phone]         |                     |
|        |             | [phone]        | [phone] (Fax)   |                     |
+--------+-------------+---------------------+----------------------+---------------------+
 
 
 
 Social History
 
 
+--------------+-------+-----------+--------+------+
| Tobacco Use  | Types | Packs/Day | Years  | Date |
|              |       |           | Used   |      |
+--------------+-------+-----------+--------+------+
| Never Smoker |       |           |        |      |
+--------------+-------+-----------+--------+------+
 
 
 
+---------------------+---+---+---+
| Smokeless Tobacco:  |   |   |   |
| Never Used          |   |   |   |
+---------------------+---+---+---+
 
 
 
+---------------------------------------------------------------+
| Comments: some second hand smoke exposure, but fairly minimal |
+---------------------------------------------------------------+
 
 
 
+-------------+-------------+---------+----------+
| Alcohol Use | Drinks/Week | oz/Week | Comments |
+-------------+-------------+---------+----------+
| No          |             |         |          |
+-------------+-------------+---------+----------+
 
 
 
 
+------------------+---------------+
| Sex Assigned at  | Date Recorded |
| Birth            |               |
+------------------+---------------+
| Not on file      |               |
+------------------+---------------+
 
 
 
+----------------+-------------+-------------+
| Job Start Date | Occupation  | Industry    |
+----------------+-------------+-------------+
| Not on file    | Not on file | Not on file |
+----------------+-------------+-------------+
 
 
 
+----------------+--------------+------------+
| Travel History | Travel Start | Travel End |
+----------------+--------------+------------+
 
 
 
+-------------------------------------+
| No recent travel history available. |
+-------------------------------------+
 documented as of this encounter
 
 Progress Notes
 Bobbi Hanson RN - 2019 10:32 AM PDTPatient scheduled for follow up left breast u
ltrasound d/t abnormal mammo: 2019 [facility] -  notified. Karin gurrola signed by Bobbi Hanson RN at 2019 10:49 AM PDTdocumented in this encounter
 
 Plan of Treatment
 
 
+--------+-----------+------------+----------------------+-------------------+
| Date   | Type      | Specialty  | Care Team            | Description       |
+--------+-----------+------------+----------------------+-------------------+
| / | Office    | Cardiology |   Tonia Wilson,    |                   |
|    | Visit     |            | ARNJESSICA  [address]   |                   |
|        |           |            | St [place], [place]  |                   |
|        |           |            | [address]  [phone]  |                   |
|        |           |            |  [phone] (Fax)  |                   |
+--------+-----------+------------+----------------------+-------------------+
| / | Hospital  | Radiology  |   Popeye Townsend, |                   |
2020   | Encounter |            |  MD  [address] |                   |
|        |           |            |  [place],   |                   |
|        |           |            | [place] [address]             |                   |
|        |           |            | [phone]         |                   |
|        |           |            | [phone] (Fax)   |                   |
+--------+-----------+------------+----------------------+-------------------+
| / | Surgery   | Radiology  |   Popeye Townsend, | CV EP PPM SYSTEM  |
2020   |           |            |  MD  [address] | IMPLANT           |
|        |           |            |  [place],   |                   |
|        |           |            | [place] [address]             |                   |
|        |           |            | [phone]         |                   |
|        |           |            | [phone] (Fax)   |                   |
+--------+-----------+------------+----------------------+-------------------+
 
| 02/10/ | Clinical  | Cardiology |                      |                   |
|    | Support   |            |                      |                   |
+--------+-----------+------------+----------------------+-------------------+
| / | Office    | Cardiology |   Tonia Wilson,    |                   |
|    | Visit     |            | [facility]  [address]   |                   |
|        |           |            | [place]  |                   |
|        |           |            | [address]  [phone]  |                   |
|        |           |            |  [phone] (Fax)  |                   |
+--------+-----------+------------+----------------------+-------------------+
| / | Off-Site  | Nephrology |   Marzena Moser  |                   |
|    | Visit     |            | DO ETIENNE  [address] [place] [address]      |                   |
|        |           |            | [address]      |                   |
|        |           |            | [place]      |                   |
|        |           |            | [address]  [phone]  |                   |
|        |           |            |  [phone] (Fax)  |                   |
+--------+-----------+------------+----------------------+-------------------+
 
 
 
+---------------------+---------+--------+----------------------+----------------------+
| Name                | Type    | Priori | Associated Diagnoses | Order Schedule       |
|                     |         | ty     |                      |                      |
+---------------------+---------+--------+----------------------+----------------------+
| US Breast Complete  | Imaging | Routin |   Abnormal mammogram | Expected:            |
| Left                |         | e      |  of left breast      | 2019, Expires: |
|                     |         |        |                      |  2020          |
+---------------------+---------+--------+----------------------+----------------------+
 documented as of this encounter
 
 Visit Diagnoses
 
 
+-----------------------------------------------+
| Diagnosis                                     |
+-----------------------------------------------+
|   Abnormal mammogram of left breast - Primary |
+-----------------------------------------------+
 documented in this encounter

## 2020-01-08 NOTE — XMS
Encounter Summary
  Created on: 2020
 
 Viviana Ricci
 External Reference #: [id number]
 : 47
 Sex: Female
 
 Demographics
 
 
+-----------------------+----------------------+
| Address               | [address]      |
|                       | [place]  [address] |
+-----------------------+----------------------+
| Home Phone            | [phone]      |
+-----------------------+----------------------+
| Preferred Language    | Unknown              |
+-----------------------+----------------------+
| Marital Status        | Single               |
+-----------------------+----------------------+
| Mosque Affiliation | 1009                 |
+-----------------------+----------------------+
| Race                  | Unknown              |
+-----------------------+----------------------+
| Ethnic Group          | Unknown              |
+-----------------------+----------------------+
 
 
 Author
 
 
+--------------+--------------------------------------------+
| Author       | [facility]  |
|              | and [place]                                |
+--------------+--------------------------------------------+
| Organization | [facility]  |
|              | and [place]                                |
+--------------+--------------------------------------------+
| Address      | Unknown                                    |
+--------------+--------------------------------------------+
| Phone        | Unavailable                                |
+--------------+--------------------------------------------+
 
 
 
 Support
 
 
+----------------+--------------+---------------------+-----------------+
| Name           | Relationship | Address             | Phone           |
+----------------+--------------+---------------------+-----------------+
| Ada/Ed Radhames | ECON         | [address]              | [phone] |
|                |              | [place]  |                 |
|                |              |  [address]              |                 |
+----------------+--------------+---------------------+-----------------+
 
 
 
 
 Care Team Providers
 
 
+-----------------------+------+-------------+
| Care Team Member Name | Role | Phone       |
+-----------------------+------+-------------+
 PCP  | Unavailable |
+-----------------------+------+-------------+
 
 
 
 Reason for Visit
 
 
+-------------------+----------+
| Reason            | Comments |
+-------------------+----------+
| Medication Refill |          |
+-------------------+----------+
 
 
 
 Encounter Details
 
 
+--------+--------+---------------------+----------------------+-------------------+
| Date   | Type   | Department          | Care Team            | Description       |
+--------+--------+---------------------+----------------------+-------------------+
| / | Refill |   [place]         |   Marzena Moser  | Medication Refill |
|    |        | NEPHROLOGY  [address]   | M, DO  [address]      |                   |
|        |        | [address]   | [address]      |                   |
|        |        | [place], [place]     | [place], [place]      |                   |
|        |        | [address]          | [address]  [phone]  |                   |
|        |        | [phone]        |  [phone] (Fax)  |                   |
+--------+--------+---------------------+----------------------+-------------------+
 
 
 
 Social History
 
 
+--------------+-------+-----------+--------+------+
| Tobacco Use  | Types | Packs/Day | Years  | Date |
|              |       |           | Used   |      |
+--------------+-------+-----------+--------+------+
| Never Smoker |       |           |        |      |
+--------------+-------+-----------+--------+------+
 
 
 
+---------------------+---+---+---+
| Smokeless Tobacco:  |   |   |   |
| Never Used          |   |   |   |
+---------------------+---+---+---+
 
 
 
+---------------------------------------------------------------+
| Comments: some second hand smoke exposure, but fairly minimal |
 
+---------------------------------------------------------------+
 
 
 
+-------------+-------------+---------+----------+
| Alcohol Use | Drinks/Week | oz/Week | Comments |
+-------------+-------------+---------+----------+
| No          |             |         |          |
+-------------+-------------+---------+----------+
 
 
 
+------------------+---------------+
| Sex Assigned at  | Date Recorded |
| Birth            |               |
+------------------+---------------+
| Not on file      |               |
+------------------+---------------+
 
 
 
+----------------+-------------+-------------+
| Job Start Date | Occupation  | Industry    |
+----------------+-------------+-------------+
| Not on file    | Not on file | Not on file |
+----------------+-------------+-------------+
 
 
 
+----------------+--------------+------------+
| Travel History | Travel Start | Travel End |
+----------------+--------------+------------+
 
 
 
+-------------------------------------+
| No recent travel history available. |
+-------------------------------------+
 documented as of this encounter
 
 Plan of Treatment
 
 
+--------+-----------+------------+----------------------+-------------------+
| Date   | Type      | Specialty  | Care Team            | Description       |
+--------+-----------+------------+----------------------+-------------------+
| / | Office    | Cardiology |   HellalfredoTonia,    |                   |
|    | Visit     |            | ARNP  [address]   |                   |
|        |           |            | St  [place], [place]  |                   |
|        |           |            | [address]  [phone]  |                   |
|        |           |            |  [phone] (Fax)  |                   |
+--------+-----------+------------+----------------------+-------------------+
| / | Hospital  | Radiology  |   Popeye Townsend, |                   |
|    | Encounter |            |  MD  [address] |                   |
|        |           |            |  St.  [place],   |                   |
|        |           |            | [place] [address]             |                   |
|        |           |            | [phone]         |                   |
|        |           |            | [phone] (Fax)   |                   |
+--------+-----------+------------+----------------------+-------------------+
| / | Surgery   | Radiology  |   Popeye Townsend, | CV EP PPM SYSTEM  |
 
|    |           |            |  MD  [address] | IMPLANT           |
|        |           |            |  St.  [place],   |                   |
|        |           |            | [place] [address]             |                   |
|        |           |            | [phone]         |                   |
|        |           |            | [phone] (Fax)   |                   |
+--------+-----------+------------+----------------------+-------------------+
| 02/10/ | Clinical  | Cardiology |                      |                   |
|    | Support   |            |                      |                   |
+--------+-----------+------------+----------------------+-------------------+
| / | Office    | Cardiology |   Tonia Wilson,    |                   |
|    | Visit     |            | ARNP  [address]   |                   |
|        |           |            | [place]  |                   |
|        |           |            | [address]  [phone]  |                   |
|        |           |            |  [phone] (Fax)  |                   |
+--------+-----------+------------+----------------------+-------------------+
| / | Off-Site  | Nephrology |   Marzena Moser  |                   |
|    | Visit     |            | DO ETIENNE  [address] [place] [address]      |                   |
|        |           |            | [address]      |                   |
|        |           |            | [place]      |                   |
|        |           |            | [address]  [phone]  |                   |
|        |           |            |  [phone] (Fax)  |                   |
+--------+-----------+------------+----------------------+-------------------+
 documented as of this encounter
 
 Visit Diagnoses
 
 
+------------------------------------------------------------------------------------------+
| Diagnosis                                                                                |
+------------------------------------------------------------------------------------------+
|   Type II or unspecified type diabetes mellitus without mention of complication,         |
| uncontrolled - Primary                                                                   |
+------------------------------------------------------------------------------------------+
|   Insulin dependent type 2 diabetes mellitus, uncontrolled (HCC)  Type II or unspecified |
|  type diabetes mellitus without mention of complication, uncontrolled                    |
+------------------------------------------------------------------------------------------+
 documented in this encounter

## 2020-01-08 NOTE — XMS
Encounter Summary
  Created on: 2020
 
 Viviana Ricci
 External Reference #: [id number]
 : 47
 Sex: Female
 
 Demographics
 
 
+-----------------------+----------------------+
| Address               | [address]      |
|                       | [place]  [address] |
+-----------------------+----------------------+
| Home Phone            | [phone]      |
+-----------------------+----------------------+
| Preferred Language    | Unknown              |
+-----------------------+----------------------+
| Marital Status        | Single               |
+-----------------------+----------------------+
| Judaism Affiliation | 1009                 |
+-----------------------+----------------------+
| Race                  | Unknown              |
+-----------------------+----------------------+
| Ethnic Group          | Unknown              |
+-----------------------+----------------------+
 
 
 Author
 
 
+--------------+--------------------------------------------+
| Author       | [facility]  |
|              | and [place]                                |
+--------------+--------------------------------------------+
| Organization | [facility]  |
|              | and [place]                                |
+--------------+--------------------------------------------+
| Address      | Unknown                                    |
+--------------+--------------------------------------------+
| Phone        | Unavailable                                |
+--------------+--------------------------------------------+
 
 
 
 Support
 
 
+----------------+--------------+---------------------+-----------------+
| Name           | Relationship | Address             | Phone           |
+----------------+--------------+---------------------+-----------------+
| Ada/Ed Radhames | ECON         | [place]              | [phone] |
|                |              | [place]  |                 |
|                |              |  [address]              |                 |
+----------------+--------------+---------------------+-----------------+
 
 
 
 
 Care Team Providers
 
 
+-----------------------+------+-------------+
| Care Team Member Name | Role | Phone       |
+-----------------------+------+-------------+
 PCP  | Unavailable |
+-----------------------+------+-------------+
 
 
 
 Encounter Details
 
 
+--------+-------------+---------------------+----------------------+-----------------+
| Date   | Type        | Department          | Care Team            | Description     |
+--------+-------------+---------------------+----------------------+-----------------+
| / | Orders Only |   [facility]         |   Marzena Moser  | Hypothyroidism  |
| 2015   |             | NEPHROLOGY  [address]   | M, DO  [address]      | (Primary Dx)    |
|        |             | [address]   | [address]      |                 |
|        |             | [place], [place]     | [place], [place]      |                 |
|        |             | [address]          | [address]  [phone]  |                 |
|        |             | [phone]        |  [phone] (Fax)  |                 |
+--------+-------------+---------------------+----------------------+-----------------+
 
 
 
 Social History
 
 
+--------------+-------+-----------+--------+------+
| Tobacco Use  | Types | Packs/Day | Years  | Date |
|              |       |           | Used   |      |
+--------------+-------+-----------+--------+------+
| Never Smoker |       |           |        |      |
+--------------+-------+-----------+--------+------+
 
 
 
+---------------------+---+---+---+
| Smokeless Tobacco:  |   |   |   |
| Never Used          |   |   |   |
+---------------------+---+---+---+
 
 
 
+---------------------------------------------------------------+
| Comments: some second hand smoke exposure, but fairly minimal |
+---------------------------------------------------------------+
 
 
 
+-------------+-------------+---------+----------+
| Alcohol Use | Drinks/Week | oz/Week | Comments |
+-------------+-------------+---------+----------+
| No          |             |         |          |
+-------------+-------------+---------+----------+
 
 
 
 
+------------------+---------------+
| Sex Assigned at  | Date Recorded |
| Birth            |               |
+------------------+---------------+
| Not on file      |               |
+------------------+---------------+
 
 
 
+----------------+-------------+-------------+
| Job Start Date | Occupation  | Industry    |
+----------------+-------------+-------------+
| Not on file    | Not on file | Not on file |
+----------------+-------------+-------------+
 
 
 
+----------------+--------------+------------+
| Travel History | Travel Start | Travel End |
+----------------+--------------+------------+
 
 
 
+-------------------------------------+
| No recent travel history available. |
+-------------------------------------+
 documented as of this encounter
 
 Plan of Treatment
 
 
+--------+-----------+------------+----------------------+-------------------+
| Date   | Type      | Specialty  | Care Team            | Description       |
+--------+-----------+------------+----------------------+-------------------+
| / | Office    | Cardiology |   Tonia Wilson,    |                   |
|    | Visit     |            | BELKIS  [address]   |                   |
|        |           |            | St  [place]  |                   |
|        |           |            | [address]  [phone]  |                   |
|        |           |            |  [phone] (Fax)  |                   |
+--------+-----------+------------+----------------------+-------------------+
| / | Hospital  | Radiology  |   Popeye Townsend, |                   |
|    | Encounter |            |  MD  [address] |                   |
|        |           |            |  St.  [place],   |                   |
|        |           |            | [place] [address]             |                   |
|        |           |            | [phone]         |                   |
|        |           |            | [phone] (Fax)   |                   |
+--------+-----------+------------+----------------------+-------------------+
| / | Surgery   | Radiology  |   Popeye Townsend, | CV EP PPM SYSTEM  |
|    |           |            |  MD  [address] | IMPLANT           |
|        |           |            |  [place],   |                   |
|        |           |            | [place] [address]             |                   |
|        |           |            | [phone]         |                   |
|        |           |            | [phone] (Fax)   |                   |
+--------+-----------+------------+----------------------+-------------------+
| 02/10/ | Clinical  | Cardiology |                      |                   |
2020   | Support   |            |                      |                   |
+--------+-----------+------------+----------------------+-------------------+
| / | Office    | Cardiology |   Tonia Wilson,    |                   |
|    | Visit     |            | ARN  [address]   |                   |
 
|        |           |            | St  [place]  |                   |
|        |           |            | [address]  [phone]  |                   |
|        |           |            |  [phone] (Fax)  |                   |
+--------+-----------+------------+----------------------+-------------------+
| / | Off-Site  | Nephrology |   Marzena Moser  |                   |
|    | Visit     |            | DO ETIENNE  [address]      |                   |
|        |           |            | [address]      |                   |
|        |           |            | [place]      |                   |
|        |           |            | [address]  [phone]  |                   |
|        |           |            |  [phone] (Fax)  |                   |
+--------+-----------+------------+----------------------+-------------------+
 documented as of this encounter
 
 Visit Diagnoses
 
 
+--------------------------------------------------------+
| Diagnosis                                              |
+--------------------------------------------------------+
|   Hypothyroidism - Primary  Unspecified hypothyroidism |
+--------------------------------------------------------+
 documented in this encounter

## 2020-01-08 NOTE — XMS
Encounter Summary
  Created on: 2020
 
 Viviana Ricci
 External Reference #: [id number]
 : 47
 Sex: Female
 
 Demographics
 
 
+-----------------------+----------------------+
| Address               | [address]      |
|                       | [place]  [address] |
+-----------------------+----------------------+
| Home Phone            | [phone]      |
+-----------------------+----------------------+
| Preferred Language    | Unknown              |
+-----------------------+----------------------+
| Marital Status        | Single               |
+-----------------------+----------------------+
| Denominational Affiliation | 1009                 |
+-----------------------+----------------------+
| Race                  | Unknown              |
+-----------------------+----------------------+
| Ethnic Group          | Unknown              |
+-----------------------+----------------------+
 
 
 Author
 
 
+--------------+--------------------------------------------+
| Author       |  [facility]  |
|              | and [place]                                |
+--------------+--------------------------------------------+
| Organization |  and [facility]  |
|              | and [place]                                |
+--------------+--------------------------------------------+
| Address      | Unknown                                    |
+--------------+--------------------------------------------+
| Phone        | Unavailable                                |
+--------------+--------------------------------------------+
 
 
 
 Support
 
 
+----------------+--------------+---------------------+-----------------+
| Name           | Relationship | Address             | Phone           |
+----------------+--------------+---------------------+-----------------+
| Ada/Ed Radhames | ECON         | [address]              | [phone] |
|                |              | [place]  |                 |
|                |              |  [address]              |                 |
+----------------+--------------+---------------------+-----------------+
 
 
 
 
 Care Team Providers
 
 
+------------------------+------+-----------------+
| Care Team Member Name  | Role | Phone           |
+------------------------+------+-----------------+
| Marzena Moser DO | PCP  | [phone] |
+------------------------+------+-----------------+
 
 
 
 Reason for Visit
 
 
+-------------------+----------+
| Reason            | Comments |
+-------------------+----------+
| Medication Refill |          |
+-------------------+----------+
 
 
 
 Encounter Details
 
 
+--------+--------+---------------------+----------------------+-------------------+
| Date   | Type   | Department          | Care Team            | Description       |
+--------+--------+---------------------+----------------------+-------------------+
| / | Refill |   [facility]         |   Marzena Moser  | Medication Refill |
| 2018   |        | NEPHROLOGY  [address]   | M, DO  [address]      |                   |
|        |        | [address]   | [address]      |                   |
|        |        | [place], [place]     | [place], [place]      |                   |
|        |        | [address]          | [address]  [phone]  |                   |
|        |        | [phone]        |  [phone] (Fax)  |                   |
+--------+--------+---------------------+----------------------+-------------------+
 
 
 
 Social History
 
 
+--------------+-------+-----------+--------+------+
| Tobacco Use  | Types | Packs/Day | Years  | Date |
|              |       |           | Used   |      |
+--------------+-------+-----------+--------+------+
| Never Smoker |       |           |        |      |
+--------------+-------+-----------+--------+------+
 
 
 
+---------------------+---+---+---+
| Smokeless Tobacco:  |   |   |   |
| Never Used          |   |   |   |
+---------------------+---+---+---+
 
 
 
+---------------------------------------------------------------+
| Comments: some second hand smoke exposure, but fairly minimal |
 
+---------------------------------------------------------------+
 
 
 
+-------------+-------------+---------+----------+
| Alcohol Use | Drinks/Week | oz/Week | Comments |
+-------------+-------------+---------+----------+
| No          |             |         |          |
+-------------+-------------+---------+----------+
 
 
 
+------------------+---------------+
| Sex Assigned at  | Date Recorded |
| Birth            |               |
+------------------+---------------+
| Not on file      |               |
+------------------+---------------+
 
 
 
+----------------+-------------+-------------+
| Job Start Date | Occupation  | Industry    |
+----------------+-------------+-------------+
| Not on file    | Not on file | Not on file |
+----------------+-------------+-------------+
 
 
 
+----------------+--------------+------------+
| Travel History | Travel Start | Travel End |
+----------------+--------------+------------+
 
 
 
+-------------------------------------+
| No recent travel history available. |
+-------------------------------------+
 documented as of this encounter
 
 Plan of Treatment
 
 
+--------+-----------+------------+----------------------+-------------------+
| Date   | Type      | Specialty  | Care Team            | Description       |
+--------+-----------+------------+----------------------+-------------------+
| / | Office    | Cardiology |   Tonia Wilson,    |                   |
|    | Visit     |            | ARNP  [address]   |                   |
|        |           |            | St [place], [place]  |                   |
|        |           |            | [address]  [phone]  |                   |
|        |           |            |  [phone] (Fax)  |                   |
+--------+-----------+------------+----------------------+-------------------+
| / | Hospital  | Radiology  |   Popeye Townsend, |                   |
|    | Encounter |            |  MD  [address] |                   |
|        |           |            |  [place],   |                   |
|        |           |            | [place] [address]             |                   |
|        |           |            | [phone]         |                   |
|        |           |            | [phone] (Fax)   |                   |
+--------+-----------+------------+----------------------+-------------------+
| / | Surgery   | Radiology  |   Popeye Townsend, | CV EP PPM SYSTEM  |
 
|    |           |            |  MD  [address] | IMPLANT           |
|        |           |            |  [place],   |                   |
|        |           |            | [place] [address]             |                   |
|        |           |            | [phone]         |                   |
|        |           |            | [phone] (Fax)   |                   |
+--------+-----------+------------+----------------------+-------------------+
| 02/10/ | Clinical  | Cardiology |                      |                   |
|    | Support   |            |                      |                   |
+--------+-----------+------------+----------------------+-------------------+
| / | Office    | Cardiology |   aRkeshTonia andre,    |                   |
|    | Visit     |            | ARNP  [address]   |                   |
|        |           |            | [place]  |                   |
|        |           |            | [address] [phone]  |                   |
|        |           |            |  [phone] (Fax)  |                   |
+--------+-----------+------------+----------------------+-------------------+
| / | Off-Site  | Nephrology |   Marzena Moser  |                   |
|    | Visit     |            | DO ETIENNE  [address] [place] [address]      |                   |
|        |           |            | [address]      |                   |
|        |           |            | [place]      |                   |
|        |           |            | [address] [phone]  |                   |
|        |           |            |  [phone] (Fax)  |                   |
+--------+-----------+------------+----------------------+-------------------+
 documented as of this encounter
 
 Visit Diagnoses
 Not on filedocumented in this encounter

## 2020-01-08 NOTE — XMS
Encounter Summary
  Created on: 2020
 
 Viviana Ricci
 External Reference #: [id number]
 : 47
 Sex: Female
 
 Demographics
 
 
+-----------------------+----------------------+
| Address               | [address]      |
|                       | [place]  [address] |
+-----------------------+----------------------+
| Home Phone            | [phone]      |
+-----------------------+----------------------+
| Preferred Language    | Unknown              |
+-----------------------+----------------------+
| Marital Status        | Single               |
+-----------------------+----------------------+
| Jainism Affiliation | [id number]                 |
+-----------------------+----------------------+
| Race                  | Unknown              |
+-----------------------+----------------------+
| Ethnic Group          | Unknown              |
+-----------------------+----------------------+
 
 
 Author
 
 
+--------------+--------------------------------------------+
| Author       | [facility]  |
|              | and [facility]                                |
+--------------+--------------------------------------------+
| Organization | [facility]  |
|              | and [facility]                                |
+--------------+--------------------------------------------+
| Address      | Unknown                                    |
+--------------+--------------------------------------------+
| Phone        | Unavailable                                |
+--------------+--------------------------------------------+
 
 
 
 Support
 
 
+----------------+--------------+---------------------+-----------------+
| Name           | Relationship | Address             | Phone           |
+----------------+--------------+---------------------+-----------------+
| Ada/Ed Radhames | ECON         | [address]              | [phone] |
|                |              | [place], [place]  |                 |
|                |              |  [address]              |                 |
+----------------+--------------+---------------------+-----------------+
 
 
 
 
 Care Team Providers
 
 
+-----------------------+------+-------------+
| Care Team Member Name | Role | Phone       |
+-----------------------+------+-------------+
 PCP  | Unavailable |
+-----------------------+------+-------------+
 
 
 
 Reason for Referral
 Diagnostic/Screening (Routine)
 
+--------+--------+-----------+--------------+--------------+---------------+
| Status | Reason | Specialty | Diagnoses /  | Referred By  | Referred To   |
|        |        |           | Procedures   | Contact      | Contact       |
+--------+--------+-----------+--------------+--------------+---------------+
| Closed |        | Radiology |   Diagnoses  |              |   [facility]    |
|        |        |           |  Pulmonary   | [place],  | [address]  |
|        |        |           | hypertension | Nena GUSMAN,   |  [place], |
|        |        |           |  ([facility])       | MD  [address]    |  [place]           |
|        |        |           | Procedures   | [address]    | [address]    |
|        |        |           | ECHO         | [place], | Phone:        |
|        |        |           | Complete     |  [place] [address]    | [phone]  |
|        |        |           |              |              |  Fax:         |
|        |        |           |              |              | [phone]  |
+--------+--------+-----------+--------------+--------------+---------------+
 
 
 
 
 Reason for Visit
 
 
+--------+----------------------+
| Reason | Comments             |
+--------+----------------------+
| Other  | requesting follow up |
+--------+----------------------+
 
 
 
 Encounter Details
 
 
+--------+-----------+----------------------+----------------+--------------------+
| Date   | Type      | Department           | Care Team      | Description        |
+--------+-----------+----------------------+----------------+--------------------+
| / | Telephone |   [facility] [place]          |   Lucretia,  | Other (requesting  |
|    |           | PULMONARY  [address]     | Nena GUSMAN MD  | follow up)         |
|        |           | [place], |                |                    |
|        |           |  [place] [address]       |                |                    |
|        |           | [phone]         |                |                    |
+--------+-----------+----------------------+----------------+--------------------+
 
 
 
 Social History
 
 
 
+--------------+-------+-----------+--------+------+
| Tobacco Use  | Types | Packs/Day | Years  | Date |
|              |       |           | Used   |      |
+--------------+-------+-----------+--------+------+
| Never Smoker |       |           |        |      |
+--------------+-------+-----------+--------+------+
 
 
 
+---------------------+---+---+---+
| Smokeless Tobacco:  |   |   |   |
| Never Used          |   |   |   |
+---------------------+---+---+---+
 
 
 
+---------------------------------------------------------------+
| Comments: some second hand smoke exposure, but fairly minimal |
+---------------------------------------------------------------+
 
 
 
+-------------+-------------+---------+----------+
| Alcohol Use | Drinks/Week | oz/Week | Comments |
+-------------+-------------+---------+----------+
| No          |             |         |          |
+-------------+-------------+---------+----------+
 
 
 
+------------------+---------------+
| Sex Assigned at  | Date Recorded |
| Birth            |               |
+------------------+---------------+
| Not on file      |               |
+------------------+---------------+
 
 
 
+----------------+-------------+-------------+
| Job Start Date | Occupation  | Industry    |
+----------------+-------------+-------------+
| Not on file    | Not on file | Not on file |
+----------------+-------------+-------------+
 
 
 
+----------------+--------------+------------+
| Travel History | Travel Start | Travel End |
+----------------+--------------+------------+
 
 
 
+-------------------------------------+
| No recent travel history available. |
+-------------------------------------+
 documented as of this encounter
 
 
 Plan of Treatment
 
 
+--------+-----------+------------+----------------------+-------------------+
| Date   | Type      | Specialty  | Care Team            | Description       |
+--------+-----------+------------+----------------------+-------------------+
| / | Office    | Cardiology |   Tonia Wilson,    |                   |
|    | Visit     |            | BELKIS  [address]   |                   |
|        |           |            | St  [place] [facility], [place]  |                   |
|        |           |            | [id number] [phone]  |                   |
|        |           |            |  [phone] (Fax)  |                   |
+--------+-----------+------------+----------------------+-------------------+
| / | Hospital  | Radiology  |   IrineoPopeye floyd, |                   |
|    | Encounter |            |  MD  [address] |                   |
|        |           |            |  St.  [place],   |                   |
|        |           |            | [place] [address]             |                   |
|        |           |            | [phone]         |                   |
|        |           |            | [phone] (Fax)   |                   |
+--------+-----------+------------+----------------------+-------------------+
| / | Surgery   | Radiology  |   Popeye Townsend, | CV EP PPM SYSTEM  |
|    |           |            |  MD  [address] | IMPLANT           |
|        |           |            |  St.  [place],   |                   |
|        |           |            | [place] [address]             |                   |
|        |           |            | [phone]         |                   |
|        |           |            | [phone] (Fax)   |                   |
+--------+-----------+------------+----------------------+-------------------+
| 02/10/ | Clinical  | Cardiology |                      |                   |
|    | Support   |            |                      |                   |
+--------+-----------+------------+----------------------+-------------------+
| / | Office    | Cardiology |   Tonia Wilson,    |                   |
|    | Visit     |            | ARNJESSICA  [address]   |                   |
|        |           |            | St  [place], [place]  |                   |
|        |           |            | [address]  [phone]  |                   |
|        |           |            |  [phone] (Fax)  |                   |
+--------+-----------+------------+----------------------+-------------------+
| / | Off-Site  | Nephrology |   Marzena Moser  |                   |
|    | Visit     |            | ETIENNE   [address]      |                   |
|        |           |            | [address]      |                   |
|        |           |            | [place]      |                   |
|        |           |            | [address]  [phone]  |                   |
|        |           |            |  [phone] (Fax)  |                   |
+--------+-----------+------------+----------------------+-------------------+
 documented as of this encounter
 
 Results
 PFT PULMONARY FUNCTION TESTING ORDERS Full PFT ([place] w/BD, lung volumes, diffusion)?: Yes 
(2014  7:13 AM PDT)
 
+------------------------------------------------------------------------+--------------+
| Narrative                                                              | Performed At |
+------------------------------------------------------------------------+--------------+
|   Nena Boykin MD       2014   7:13        PULMONARY     |              |
| FUNCTION TESTING   SPIROMETRY: FVC was normal at 2.60 L or 81% of      |              |
| predicted. FEV1   was normal at 2.15 L or 87% of predicted. FEV1/FVC   |              |
| ratio was   normal at 83%.   LUNG VOLUMES: Total lung capacity was     |              |
| normal at 4.28 L or 80% of   predicted. Residual volume was moderately |              |
|  reduced at 1.29 L or   58% of predicted. RV/TLC ratio was mildly      |              |
| reduced at 30% or 71 %   of predicted. ERV was significantly reduced   |              |
| at 0.26 L or 24% of   predicted.   DIFFUSION CAPACITY: Diffusion       |              |
| capacity was moderately reduced at   14.1 mL/mmHg per minute or 56% of |              |
 
|  predicted and was not corrected   for a measured hemoglobin.          |              |
| IMPRESSION: Spirometry is consistent with normal physiology. Lung      |              |
| volume testing is consistent with suggests possible mild   restrictive |              |
|  physiology physiology. Diffusion capacity is   moderately reduced and |              |
|  is not corrected for measured hemoglobin.   Spriometry has not        |              |
| changed significantly since 2013.   Since 2013, there |              |
|  has been a drop in FVC and FEV1 that   exceeds 10% (exact percent not |              |
|  calculated). DLCO has increased   since that time. Neither DLCO value |              |
|  was corrected for a measured   hemoglobin.     Electronically signed  |              |
| by: Nena Boykin MD 2014   7:06  [facility]   |              |
| [facility]     CC: Marzena Moser                         |              |
+------------------------------------------------------------------------+--------------+
 
 
 
+------------------------------------------------------------------------------------------+
| Procedure Note                                                                           |
+------------------------------------------------------------------------------------------+
|   Nena Boykin MD - 2014  7:06 AM PDT  PULMONARY FUNCTION TESTING        |
| SPIROMETRY: FVC was normal at 2.60 L or 81% of predicted. FEV1 was normal at 2.15 L or   |
| 87% of predicted. FEV1/FVC ratio was normal at 83%. LUNG VOLUMES: Total lung capacity    |
| was normal at 4.28 L or 80% of predicted. Residual volume was moderately reduced at 1.29 |
|  L or 58% of predicted. RV/TLC ratio was mildly reduced at 30% or 71 % of predicted. ERV |
|  was significantly reduced at 0.26 L or 24% of predicted. DIFFUSION CAPACITY: Diffusion  |
| capacity was moderately reduced at 14.1 mL/mmHg per minute or 56% of predicted and was   |
| not corrected for a measured hemoglobin.IMPRESSION: Spirometry is consistent with normal |
|  physiology. Lung volume testing is consistent with suggests possible mild restrictive   |
| physiology physiology. Diffusion capacity is moderately reduced and is not corrected for |
|  measured hemoglobin. Spriometry has not changed significantly since 2013. Since |
|  2013, there has been a drop in FVC and FEV1 that exceeds 10% (exact percent    |
| not calculated). DLCO has increased since that time. Neither DLCO value was corrected    |
| for a measured hemoglobin.Electronically signed by: Nena Boykin MD 2014  |
| 7:06[facility]                       |
+------------------------------------------------------------------------------------------+
 ECHO Complete (04/15/2014  3:55 PM PDT)
 
+----------+
| Specimen |
+----------+
|          |
+----------+
 
 
 
+------------------------------------------------------------------------+-----------------+
| Narrative                                                              | Performed At    |
+------------------------------------------------------------------------+-----------------+
|   [facility]     ECHOCARDIOGRAM REPORT         |   [place]    |
| STUDY DATE:   4/15/2014        PATIENT NAME: Viviana Ricci  :      | [facility]        |
| 1945  MRN: [id number]  PCP: Marzena Moser, [facility]  |
| CLINICAL HISTORY/DIAGNOSIS:   PULM HTN     A transthoracic             | - IMAGING       |
| echocardiogram with M-mode, pulsed-wave and color Doppler   was        |                 |
| performed with standard views obtained.   The technical quality of     |                 |
| this   examination is adequate.   The heart rhythm during the echo is  |                 |
| sinus   rhythm.   The M-mode, two-dimensional, color flow and spectral |                 |
|  Doppler data   were reviewed and support the following                |                 |
| interpretation:     Interpretation:  Left Atrium: Mildly dilated  Left |                 |
|  ventricle:   Left ventricular size is normal with normal wall         |                 |
| thickness and motion, and normal left ventricular systolic function.   |                 |
|   The   estimated ejection fraction is 70-75 %.   Grade 1 left         |                 |
 
| ventricular   diastolic dysfunction.   Aortic root: Aortic root is     |                 |
| normal.  Right Atrium:   Right atrial sizes normal.   Normal           |                 |
| right-sided pressure.  Right ventricle:   Right ventricular size is    |                 |
| normal with normal wall   thickness and normal right ventricular       |                 |
| systolic function.  Pericardium:   Pericardium is normal.  Pulmonary   |                 |
| artery:   Pulmonary artery is normal. normal right-sided pressure      |                 |
| Aortic valve:   Aortic valve is trileaflet with mild thickening and    |                 |
| opens   normally.   Mild aortic valve insufficiency  Mitral valve:     |                 |
|   Mitral valve is normal. mild mitral valve regurgitation  Pulmonic    |                 |
| valve:   Pulmonic valve is normal.  Tricuspid valve:   Tricuspid valve |                 |
|  is normal. mild tricuspid valve   regurgitation  Vena cava: Not seen  |                 |
|        IMPRESSIONS:  1.   Mild left atrial dilatation.  2.   Normal    |                 |
| left ventricular size, wall thickness and motion.   Preserved   left   |                 |
| ventricular systolic function.   LVEF is 70-75%.  3.   Grade 1 left    |                 |
| ventricular diastole dysfunction.  4.   Mild tricuspid valve           |                 |
| regurgitation.  5.   Mild thickened trileaflet aortic valve with       |                 |
| adequate opening.   A mild   aortic valve insufficiency.  6.   Normal  |                 |
| right-sided pressure.              Measurements:  Height: 66  Weight:  |                 |
| 277  Aortic root:   37 mm  Aortic cusp sep:   18 mm  LA:   48 mm       |                 |
| IVS-diastole:   11 mm  IVS-systole:   20 mm  LVPW diastole:   11 mm    |                 |
| LVPW systole:   18 mm  LV diameter-diastole:   52 mm  LV               |                 |
| diameter-systole:   28 mm  Fractional shortenin %  PFV aortic   |                 |
| valve:    m/s  MPG mitral valve:    mmHg  PFV TR jet:   2.25 m/s       |                 |
| RA/RV PP mmHg  LA volume:   50 mL  LA index:   22 mL/m2  Mitral |                 |
|  Inflow DT:   290 ms  IVRT:   110 ms  Valsalva:   NOT NEEDED  PWDTI S  |                 |
| wave:   6.7 cm/s  PWDTI E wave:   5.6 cm/s  PWDTI A wave:   5.0 cm/s   |                 |
| E/A Ratio:   1.120  E/E Ratio:   9.45              Signed by:          |                 |
| Popeye Townsend MD [facility]     4/15/2014   16:02           Sonographer: |                 |
|    Jn Zilliox, RDCS, RVT, RDMS                                    |                 |
+------------------------------------------------------------------------+-----------------+
 
 
 
+-------------------------------------------------------------------------------------------
----------------------------------+
| Procedure Note                                                                            
                                  |
+-------------------------------------------------------------------------------------------
----------------------------------+
|   Popeye Townsend MD - 04/15/2014  4:20 PM [facility]            
                                  |
| CENTERECHOCARDIOGRAM REPORTSTUDY DATE:  4/15/2014PATIENT NAME: Viviana RicciBRIANNA:         
                                  |
| 1945MRN: [id number]: Marzena Moser, DOCLINICAL HISTORY/DIAGNOSIS:  PULM    
                                  |
| HTNA transthoracic echocardiogram with M-mode, pulsed-wave and color Doppler was          
                                  |
| performed with standard views obtained.  The technical quality of this examination is     
                                  |
| adequate.  The heart rhythm during the echo is sinus rhythm.  The M-mode,                 
                                  |
| two-dimensional, color flow and spectral Doppler data were reviewed and support the       
                                  |
| following interpretation:Interpretation:Left Atrium: Mildly dilatedLeft ventricle:  Left  
                                  |
|  ventricular size is normal with normal wall thickness and motion, and normal left        
                                  |
| ventricular systolic function.  The estimated ejection fraction is 70-75 %.  Grade 1      
                                  |
| left ventricular diastolic dysfunction. Aortic root: Aortic root is normal.Right Atrium:  
 
                                  |
|   Right atrial sizes normal.  Normal right-sided pressure.Right ventricle:  Right         
                                  |
| ventricular size is normal with normal wall thickness and normal right ventricular        
                                  |
| systolic function.Pericardium:  Pericardium is normal.Pulmonary artery:  Pulmonary        
                                  |
| artery is normal. normal right-sided pressureAortic valve:  Aortic valve is trileaflet    
                                  |
| with mild thickening and opens normally.  Mild aortic valve insufficiencyMitral valve:    
                                  |
| Mitral valve is normal. mild mitral valve regurgitationPulmonic valve:  Pulmonic valve    
                                  |
| is normal.Tricuspid valve:  Tricuspid valve is normal. mild tricuspid valve               
                                  |
| regurgitationVena cava: Not seenIMPRESSIONS:1.  Mild left atrial dilatation.2.  Normal    
                                  |
| left ventricular size, wall thickness and motion.  Preserved left ventricular systolic    
                                  |
| function.  LVEF is 70-75%.3.  Grade 1 left ventricular diastole dysfunction.4.  Mild      
                                  |
| tricuspid valve regurgitation.5.  Mild thickened trileaflet aortic valve with adequate    
                                  |
| opening.  A mild aortic valve insufficiency.6.  Normal right-sided                        
                                  |
| pressure.Measurements:Height: 66Weight: 277Aortic root:  37 mmAortic cusp sep:  18 mmLA:  
                                  |
|   48 mmIVS-diastole:  11 mmIVS-systole:  20 mmLVPW diastole:  11 mmLVPW systole:  18      
                                  |
| mmLV diameter-diastole:  52 mmLV diameter-systole:  28 mmFractional shortenin %PFV  
                                  |
|  aortic valve:   m/sMPG mitral valve:   mmHgPFV TR jet:  2.25 m/[place]/RV PP mmHgLA    
                                  |
| volume:  50 mLLA index:  22 mL/a2Uuhlwq Inflow DT:  290 msIVRT:  110 msValsalva:  NOT     
                                  |
| NEEDEDPWDTI S wave:  6.7 cm/sPWDTI E wave:  5.6 cm/sPWDTI A wave:  5.0 cm/sE/A Ratio:     
                                  |
| 1.120E/E Ratio:  9.45Signed by:   Popeye Townsend MD [facility]  4/15/2014  16:02             
                                  |
| Sonographer:  Jn Trinh RDCS, RVT, GERRI                                            
                                  |
|IMPRESSIONS:                                                                               
                                 |
|1.  Mild left atrial dilatation.                                                           
                                  |
|2.  Normal left ventricular size, wall thickness and motion.  Preserved left ventricular sy
stolic function.  LVEF is 70-75%. |
|3.  Grade 1 left ventricular diastole dysfunction.                                         
                                  |
|4.  Mild tricuspid valve regurgitation.                                                    
                                  |
|5.  Mild thickened trileaflet aortic valve with adequate opening.  A mild aortic valve insu
fficiency.                        |
|6.  Normal right-sided pressure.                                                           
                                  |
|                                                                                           
 
                                  |
|Measurements:                                                                              
                                 |
|Height: 66                                                                                 
                                  |
|Weight: 277                                                                                
                                  |
|Aortic root:  37 mm                                                                        
                                  |
|Aortic cusp sep:  18 mm                                                                    
                                  |
|LA:  48 mm                                                                                 
                                  |
|IVS-diastole:  11 mm                                                                       
                                  |
|IVS-systole:  20 mm                                                                        
                                  |
|LVPW diastole:  11 mm                                                                      
                                  |
|LVPW systole:  18 mm                                                                       
                                  |
|LV diameter-diastole:  52 mm                                                               
                                  |
|LV diameter-systole:  28 mm                                                                
                                  |
|Fractional shortenin %                                                               
                                  |
|PFV aortic valve:   m/s                                                                    
                                  |
|MPG mitral valve:   mmHg                                                                   
                                  |
|PFV TR jet:  2.25 m/s                                                                      
                                  |
|RA/RV PP mmHg                                                                        
                                  |
|LA volume:  50 mL                                                                          
                                  |
|LA index:  22 mL/m2                                                                        
                                  |
|Mitral Inflow DT:  290 ms                                                                  
                                  |
|IVRT:  110 ms                                                                              
                                  |
|Valsalva:  NOT NEEDED                                                                      
                                  |
|PWDTI S wave:  6.7 cm/s                                                                    
                                  |
|PWDTI E wave:  5.6 cm/s                                                                    
                                  |
|PWDTI A wave:  5.0 cm/s                                                                    
                                  |
|E/A Ratio:  1.120                                                                          
                                  |
|E/E Ratio:  9.45                                                                           
 
                                  |
|Signed by:   Popeye Townsend MD [facility]                                                     
                                  |
|  4/15/2014  16:02                                                                         
                                  |
|Sonographer:  Jn Trinh RDCS, DELICIA, RDMS                                             
                                  |
+-------------------------------------------------------------------------------------------
----------------------------------+
 
 
 
+----------------------+---------------------+--------------------+----------------+
| Performing           | Address             | City/State/Zipcode | Phone Number   |
| Organization         |                     |                    |                |
+----------------------+---------------------+--------------------+----------------+
|   [facility]     |   [address] | [place]    |   [phone] |
| [facility]  |                     | [address]              |                |
| - IMAGING            |                     |                    |                |
+----------------------+---------------------+--------------------+----------------+
 documented in this encounter
 
 Visit Diagnoses
 
 
+----------------------------------------------------------------------------------+
| Diagnosis                                                                        |
+----------------------------------------------------------------------------------+
|   Pulmonary hypertension (HCC) - Primary  Other chronic pulmonary heart diseases |
+----------------------------------------------------------------------------------+
|   Cough                                                                          |
+----------------------------------------------------------------------------------+
|   Dyspnea  Other dyspnea and respiratory abnormality                             |
+----------------------------------------------------------------------------------+
 documented in this encounter

## 2020-01-08 NOTE — XMS
Encounter Summary
  Created on: 2020
 
 Viviana Ricci
 External Reference #: [id number]
 : 47
 Sex: Female
 
 Demographics
 
 
+-----------------------+----------------------+
| Address               | [address]      |
|                       | [place]  [address] |
+-----------------------+----------------------+
| Home Phone            | [phone]      |
+-----------------------+----------------------+
| Preferred Language    | Unknown              |
+-----------------------+----------------------+
| Marital Status        | Single               |
+-----------------------+----------------------+
| Yarsanism Affiliation | [id number]                 |
+-----------------------+----------------------+
| Race                  | Unknown              |
+-----------------------+----------------------+
| Ethnic Group          | Unknown              |
+-----------------------+----------------------+
 
 
 Author
 
 
+--------------+--------------------------------------------+
| Author       | [facility]  |
|              | and [place]                                |
+--------------+--------------------------------------------+
| Organization | [facility]  |
|              | and [place]                                |
+--------------+--------------------------------------------+
| Address      | Unknown                                    |
+--------------+--------------------------------------------+
| Phone        | Unavailable                                |
+--------------+--------------------------------------------+
 
 
 
 Support
 
 
+----------------+--------------+---------------------+-----------------+
| Name           | Relationship | Address             | Phone           |
+----------------+--------------+---------------------+-----------------+
| Ada/Ed Radhames | ECON         | [address]              | [phone] |
|                |              | [place]  |                 |
|                |              |  [address]              |                 |
+----------------+--------------+---------------------+-----------------+
 
 
 
 
 Care Team Providers
 
 
+-----------------------+------+-------------+
| Care Team Member Name | Role | Phone       |
+-----------------------+------+-------------+
 PCP  | Unavailable |
+-----------------------+------+-------------+
 
 
 
 Encounter Details
 
 
+--------+-------------+---------------------+----------------------+-------------+
| Date   | Type        | Department          | Care Team            | Description |
+--------+-------------+---------------------+----------------------+-------------+
| 07/15/ | Orders Only |   [facility]  [place]         |   Marzena Moser  |             |
|    |             | NEPHROLOGY  [address]   | M, DO  [address]      |             |
|        |             | [address]   | [address]      |             |
|        |             | [place]     | [place]      |             |
|        |             | [address]          | [address]  [phone]  |             |
|        |             | [phone]        |  [phone] (Fax)  |             |
+--------+-------------+---------------------+----------------------+-------------+
 
 
 
 Social History
 
 
+--------------+-------+-----------+--------+------+
| Tobacco Use  | Types | Packs/Day | Years  | Date |
|              |       |           | Used   |      |
+--------------+-------+-----------+--------+------+
| Never Smoker |       |           |        |      |
+--------------+-------+-----------+--------+------+
 
 
 
+---------------------+---+---+---+
| Smokeless Tobacco:  |   |   |   |
| Never Used          |   |   |   |
+---------------------+---+---+---+
 
 
 
+---------------------------------------------------------------+
| Comments: some second hand smoke exposure, but fairly minimal |
+---------------------------------------------------------------+
 
 
 
+-------------+-------------+---------+----------+
| Alcohol Use | Drinks/Week | oz/Week | Comments |
+-------------+-------------+---------+----------+
| No          |             |         |          |
+-------------+-------------+---------+----------+
 
 
 
 
+------------------+---------------+
| Sex Assigned at  | Date Recorded |
| Birth            |               |
+------------------+---------------+
| Not on file      |               |
+------------------+---------------+
 
 
 
+----------------+-------------+-------------+
| Job Start Date | Occupation  | Industry    |
+----------------+-------------+-------------+
| Not on file    | Not on file | Not on file |
+----------------+-------------+-------------+
 
 
 
+----------------+--------------+------------+
| Travel History | Travel Start | Travel End |
+----------------+--------------+------------+
 
 
 
+-------------------------------------+
| No recent travel history available. |
+-------------------------------------+
 documented as of this encounter
 
 Plan of Treatment
 
 
+--------+-----------+------------+----------------------+-------------------+
| Date   | Type      | Specialty  | Care Team            | Description       |
+--------+-----------+------------+----------------------+-------------------+
| / | Office    | Cardiology |   Tonia Wilson,    |                   |
|    | Visit     |            | [address]   |                   |
|        |           |            | [place]  |                   |
|        |           |            | [address] [phone]  |                   |
|        |           |            |  [phone] (Fax)  |                   |
+--------+-----------+------------+----------------------+-------------------+
| / | Hospital  | Radiology  |   Popeye Townsend, |                   |
|    | Encounter |            |  MD [address] |                   |
|        |           |            |  St.  [place],   |                   |
|        |           |            | [place] [address]             |                   |
|        |           |            | [phone]         |                   |
|        |           |            | [phone] (Fax)   |                   |
+--------+-----------+------------+----------------------+-------------------+
| / | Surgery   | Radiology  |   Popeye Townsend, | CV EP PPM SYSTEM  |
|    |           |            |  MD  [address] | IMPLANT           |
|        |           |            |  St.  [place],   |                   |
|        |           |            | [place] [address]             |                   |
|        |           |            | [phone]         |                   |
|        |           |            | [phone] (Fax)   |                   |
+--------+-----------+------------+----------------------+-------------------+
| 02/10/ | Clinical  | Cardiology |                      |                   |
|    | Support   |            |                      |                   |
+--------+-----------+------------+----------------------+-------------------+
| / | Office    | Cardiology |   Tonia Wilson,    |                   |
|    | Visit     |            | [address]   |                   |
 
|        |           |            | St  [place], [place]  |                   |
|        |           |            | [address] [phone]  |                   |
|        |           |            |  [phone] (Fax)  |                   |
+--------+-----------+------------+----------------------+-------------------+
| / | Off-Site  | Nephrology |   Marzena Moser  |                   |
|    | Visit     |            | DO [address]      |                   |
|        |           |            | [address]      |                   |
|        |           |            | [place]      |                   |
|        |           |            | [address] [phone]  |                   |
|        |           |            |  [phone] (Fax)  |                   |
+--------+-----------+------------+----------------------+-------------------+
 documented as of this encounter
 
 Visit Diagnoses
 Not on filedocumented in this encounter

## 2020-01-08 NOTE — XMS
Encounter Summary
  Created on: 2020
 
 Viviana Ricci
 External Reference #: [id number]
 : 47
 Sex: Female
 
 Demographics
 
 
+-----------------------+----------------------+
| Address               | [address]      |
|                       | [place]  [address] |
+-----------------------+----------------------+
| Home Phone            | [phone]      |
+-----------------------+----------------------+
| Preferred Language    | Unknown              |
+-----------------------+----------------------+
| Marital Status        | Single               |
+-----------------------+----------------------+
| Zoroastrian Affiliation | [id number]                 |
+-----------------------+----------------------+
| Race                  | Unknown              |
+-----------------------+----------------------+
| Ethnic Group          | Unknown              |
+-----------------------+----------------------+
 
 
 Author
 
 
+--------------+--------------------------------------------+
| Author       | [facility]  |
|              | and [place]                                |
+--------------+--------------------------------------------+
| Organization | [facility]  |
|              | and [place]                                |
+--------------+--------------------------------------------+
| Address      | Unknown                                    |
+--------------+--------------------------------------------+
| Phone        | Unavailable                                |
+--------------+--------------------------------------------+
 
 
 
 Support
 
 
+----------------+--------------+---------------------+-----------------+
| Name           | Relationship | Address             | Phone           |
+----------------+--------------+---------------------+-----------------+
| Ada/Ed Radhames | ECON         | [place]              | [phone] |
|                |              | [place]  |                 |
|                |              |  [address]              |                 |
+----------------+--------------+---------------------+-----------------+
 
 
 
 
 Care Team Providers
 
 
+-----------------------+------+-------------+
| Care Team Member Name | Role | Phone       |
+-----------------------+------+-------------+
 PCP  | Unavailable |
+-----------------------+------+-------------+
 
 
 
 Reason for Visit
 
 
+-------------------+----------+
| Reason            | Comments |
+-------------------+----------+
| Medication Refill |          |
+-------------------+----------+
 
 
 
 Encounter Details
 
 
+--------+--------+---------------------+----------------------+-------------------+
| Date   | Type   | Department          | Care Team            | Description       |
+--------+--------+---------------------+----------------------+-------------------+
| / | Refill |   [place]         |   Marzena Moser  | Medication Refill |
|    |        | NEPHROLOGY  [address]   | M, DO  [address]      |                   |
|        |        | [address]   | [address]      |                   |
|        |        | [place], [place]     | [place], [place]      |                   |
|        |        | [address]          | [address]  [phone]  |                   |
|        |        | [phone]        |  [phone] (Fax)  |                   |
+--------+--------+---------------------+----------------------+-------------------+
 
 
 
 Social History
 
 
+--------------+-------+-----------+--------+------+
| Tobacco Use  | Types | Packs/Day | Years  | Date |
|              |       |           | Used   |      |
+--------------+-------+-----------+--------+------+
| Never Smoker |       |           |        |      |
+--------------+-------+-----------+--------+------+
 
 
 
+---------------------+---+---+---+
| Smokeless Tobacco:  |   |   |   |
| Never Used          |   |   |   |
+---------------------+---+---+---+
 
 
 
+-------------+-------------+---------+----------+
| Alcohol Use | Drinks/Week | oz/Week | Comments |
 
+-------------+-------------+---------+----------+
| No          |             |         |          |
+-------------+-------------+---------+----------+
 
 
 
+------------------+---------------+
| Sex Assigned at  | Date Recorded |
| Birth            |               |
+------------------+---------------+
| Not on file      |               |
+------------------+---------------+
 
 
 
+----------------+-------------+-------------+
| Job Start Date | Occupation  | Industry    |
+----------------+-------------+-------------+
| Not on file    | Not on file | Not on file |
+----------------+-------------+-------------+
 
 
 
+----------------+--------------+------------+
| Travel History | Travel Start | Travel End |
+----------------+--------------+------------+
 
 
 
+-------------------------------------+
| No recent travel history available. |
+-------------------------------------+
 documented as of this encounter
 
 Plan of Treatment
 
 
+--------+-----------+------------+----------------------+-------------------+
| Date   | Type      | Specialty  | Care Team            | Description       |
+--------+-----------+------------+----------------------+-------------------+
| / | Office    | Cardiology |   Tonia Wilson,    |                   |
|    | Visit     |            | BEL[address]  [address]   |                   |
|        |           |            | St  [place], [place]  |                   |
|        |           |            | [address] [phone]  |                   |
|        |           |            |  [phone] (Fax)  |                   |
+--------+-----------+------------+----------------------+-------------------+
| / | Hospital  | Radiology  |   Cary Himanshuraul, |                   |
|    | Encounter |            |  MD  [address] |                   |
|        |           |            |  St.  [place],   |                   |
|        |           |            | [place] [address]             |                   |
|        |           |            | [phone]         |                   |
|        |           |            | [phone] (Fax)   |                   |
+--------+-----------+------------+----------------------+-------------------+
| / | Surgery   | Radiology  |   Aimeechidi Yinpeeraul, | CV EP PPM SYSTEM  |
|    |           |            |  MD  [address] | IMPLANT           |
|        |           |            |  St.  [place],   |                   |
|        |           |            | [place] [address]             |                   |
|        |           |            | [phone]         |                   |
|        |           |            | [phone] (Fax)   |                   |
+--------+-----------+------------+----------------------+-------------------+
 
| 02/10/ | Clinical  | Cardiology |                      |                   |
|    | Support   |            |                      |                   |
+--------+-----------+------------+----------------------+-------------------+
| / | Office    | Cardiology |   Tonia Wilson,    |                   |
|    | Visit     |            | ARNP  [address]   |                   |
|        |           |            | St  [place], [place]  |                   |
|        |           |            | [address]  [phone]  |                   |
|        |           |            |  [phone] (Fax)  |                   |
+--------+-----------+------------+----------------------+-------------------+
| / | Off-Site  | Nephrology |   Marzena Moser  |                   |
|    | Visit     |            | DO ETIENNE  [address] [place] [address]      |                   |
|        |           |            | [address]      |                   |
|        |           |            | [place]      |                   |
|        |           |            | [address] [phone]  |                   |
|        |           |            |  [phone] (Fax)  |                   |
+--------+-----------+------------+----------------------+-------------------+
 documented as of this encounter
 
 Visit Diagnoses
 Not on filedocumented in this encounter

## 2020-01-08 NOTE — XMS
Encounter Summary
  Created on: 2020
 
 Viviana Ricci
 External Reference #: [id number]
 : 47
 Sex: Female
 
 Demographics
 
 
+-----------------------+----------------------+
| Address               | [address]      |
|                       | [place]  [address] |
+-----------------------+----------------------+
| Home Phone            | [phone]      |
+-----------------------+----------------------+
| Preferred Language    | Unknown              |
+-----------------------+----------------------+
| Marital Status        | Single               |
+-----------------------+----------------------+
| Jehovah's witness Affiliation | [id number]                 |
+-----------------------+----------------------+
| Race                  | Unknown              |
+-----------------------+----------------------+
| Ethnic Group          | Unknown              |
+-----------------------+----------------------+
 
 
 Author
 
 
+--------------+--------------------------------------------+
| Author       | [facility]  |
|              | and [place]                                |
+--------------+--------------------------------------------+
| Organization | [facility]  |
|              | and [place]                                |
+--------------+--------------------------------------------+
| Address      | Unknown                                    |
+--------------+--------------------------------------------+
| Phone        | Unavailable                                |
+--------------+--------------------------------------------+
 
 
 
 Support
 
 
+----------------+--------------+---------------------+-----------------+
| Name           | Relationship | Address             | Phone           |
+----------------+--------------+---------------------+-----------------+
| Ada/Ed Radhames | ECON         | [address]              | [phone] |
|                |              | [place]  |                 |
|                |              |  [address]              |                 |
+----------------+--------------+---------------------+-----------------+
 
 
 
 
 Care Team Providers
 
 
+------------------------+------+-----------------+
| Care Team Member Name  | Role | Phone           |
+------------------------+------+-----------------+
| Marzena Moser DO | PCP  | [phone] |
+------------------------+------+-----------------+
 
 
 
 Reason for Visit
 
 
+--------+------------------+
| Reason | Comments         |
+--------+------------------+
| Other  | montior and test |
+--------+------------------+
 
 
 
 Encounter Details
 
 
+--------+-----------+----------------------+----------------------+---------------------+
| Date   | Type      | Department           | Care Team            | Description         |
+--------+-----------+----------------------+----------------------+---------------------+
| 10/29/ | Telephone |   [facility]          |   Tnoia Wilson,    | Patrice (celina and  |
|    |           | CARDIOLOGY  [address]    | ARNP  [address]   | test)               |
|        |           | [address]  [place], | [address]  [place], [place]  |                     |
|        |           |  [place] [address]       | [address]  [phone]  |                     |
|        |           | [phone]         |  [phone] (Fax)  |                     |
+--------+-----------+----------------------+----------------------+---------------------+
 
 
 
 Social History
 
 
+--------------+-------+-----------+--------+------+
| Tobacco Use  | Types | Packs/Day | Years  | Date |
|              |       |           | Used   |      |
+--------------+-------+-----------+--------+------+
| Never Smoker |       |           |        |      |
+--------------+-------+-----------+--------+------+
 
 
 
+---------------------+---+---+---+
| Smokeless Tobacco:  |   |   |   |
| Never Used          |   |   |   |
+---------------------+---+---+---+
 
 
 
+---------------------------------------------------------------+
| Comments: some second hand smoke exposure, but fairly minimal |
+---------------------------------------------------------------+
 
 
 
 
+-------------+-------------+---------+----------+
| Alcohol Use | Drinks/Week | oz/Week | Comments |
+-------------+-------------+---------+----------+
| No          |             |         |          |
+-------------+-------------+---------+----------+
 
 
 
+------------------+---------------+
| Sex Assigned at  | Date Recorded |
| Birth            |               |
+------------------+---------------+
| Not on file      |               |
+------------------+---------------+
 
 
 
+----------------+-------------+-------------+
| Job Start Date | Occupation  | Industry    |
+----------------+-------------+-------------+
| Not on file    | Not on file | Not on file |
+----------------+-------------+-------------+
 
 
 
+----------------+--------------+------------+
| Travel History | Travel Start | Travel End |
+----------------+--------------+------------+
 
 
 
+-------------------------------------+
| No recent travel history available. |
+-------------------------------------+
 documented as of this encounter
 
 Plan of Treatment
 
 
+--------+-----------+------------+----------------------+-------------------+
| Date   | Type      | Specialty  | Care Team            | Description       |
+--------+-----------+------------+----------------------+-------------------+
| / | Office    | Cardiology |   Tonia Wilson,    |                   |
|    | Visit     |            | ARNP  [address]   |                   |
|        |           |            | St [place], [place]  |                   |
|        |           |            | [address]  [phone]  |                   |
|        |           |            |  [phone] (Fax)  |                   |
+--------+-----------+------------+----------------------+-------------------+
| / | Hospital  | Radiology  |   Popeye Townsend, |                   |
|    | Encounter |            |  MD  [address] |                   |
|        |           |            |  [place],   |                   |
|        |           |            | [place] [address]             |                   |
|        |           |            | [phone]         |                   |
|        |           |            | [phone] (Fax)   |                   |
+--------+-----------+------------+----------------------+-------------------+
| / | Surgery   | Radiology  |   Popeye Townsend, | CV EP PPM SYSTEM  |
|    |           |            |  MD  [address] | IMPLANT           |
 
|        |           |            |  [place],   |                   |
|        |           |            | [place] [address]             |                   |
|        |           |            | [phone]         |                   |
|        |           |            | [phone] (Fax)   |                   |
+--------+-----------+------------+----------------------+-------------------+
| 02/10/ | Clinical  | Cardiology |                      |                   |
|    | Support   |            |                      |                   |
+--------+-----------+------------+----------------------+-------------------+
| / | Office    | Cardiology |   RakeshTonia andre,    |                   |
|    | Visit     |            | BELKIS  [address]   |                   |
|        |           |            | [place]  |                   |
|        |           |            | [address] [phone]  |                   |
|        |           |            |  [phone] (Fax)  |                   |
+--------+-----------+------------+----------------------+-------------------+
| / | Off-Site  | Nephrology |   Marzena Moser  |                   |
|    | Visit     |            | DO ETIENNE  [address] [place] [address]      |                   |
|        |           |            | [address]      |                   |
|        |           |            | [place]      |                   |
|        |           |            | [address] [phone]  |                   |
|        |           |            |  [phone] (Fax)  |                   |
+--------+-----------+------------+----------------------+-------------------+
 documented as of this encounter
 
 Visit Diagnoses
 Not on filedocumented in this encounter

## 2020-01-08 NOTE — XMS
Encounter Summary
  Created on: 2020
 
 Viviana Ricci
 External Reference #: [id number]
 : 47
 Sex: Female
 
 Demographics
 
 
+-----------------------+----------------------+
| Address               | [address]      |
|                       | [place]  [address] |
+-----------------------+----------------------+
| Home Phone            | [phone]      |
+-----------------------+----------------------+
| Preferred Language    | Unknown              |
+-----------------------+----------------------+
| Marital Status        | Single               |
+-----------------------+----------------------+
| Denominational Affiliation | 1009                 |
+-----------------------+----------------------+
| Race                  | Unknown              |
+-----------------------+----------------------+
| Ethnic Group          | Unknown              |
+-----------------------+----------------------+
 
 
 Author
 
 
+--------------+--------------------------------------------+
| Author       | [facility]  |
|              | and [place]                                |
+--------------+--------------------------------------------+
| Organization | [facility]  |
|              | and [place]                                |
+--------------+--------------------------------------------+
| Address      | Unknown                                    |
+--------------+--------------------------------------------+
| Phone        | Unavailable                                |
+--------------+--------------------------------------------+
 
 
 
 Support
 
 
+----------------+--------------+---------------------+-----------------+
| Name           | Relationship | Address             | Phone           |
+----------------+--------------+---------------------+-----------------+
| Ada/Ed Radhames | ECON         | [address]              | [phone] |
|                |              | [place]  |                 |
|                |              |  [address]              |                 |
+----------------+--------------+---------------------+-----------------+
 
 
 
 
 Care Team Providers
 
 
+-----------------------+------+-------------+
| Care Team Member Name | Role | Phone       |
+-----------------------+------+-------------+
 PCP  | Unavailable |
+-----------------------+------+-------------+
 
 
 
 Reason for Visit
 
 
+-------------------+----------+
| Reason            | Comments |
+-------------------+----------+
| Medication Refill |          |
+-------------------+----------+
 
 
 
 Encounter Details
 
 
+--------+--------+---------------------+----------------------+-------------------+
| Date   | Type   | Department          | Care Team            | Description       |
+--------+--------+---------------------+----------------------+-------------------+
| / | Refill |   [facility]         |   Marzena Moser  | Medication Refill |
|    |        | NEPHROLOGY  [address]   | M, DO  [address]      |                   |
|        |        | [address]   | [address]      |                   |
|        |        | [place], [place]     | [place], [place]      |                   |
|        |        | [address]          | [address]  [phone]  |                   |
|        |        | [phone]        |  [phone] (Fax)  |                   |
+--------+--------+---------------------+----------------------+-------------------+
 
 
 
 Social History
 
 
+--------------+-------+-----------+--------+------+
| Tobacco Use  | Types | Packs/Day | Years  | Date |
|              |       |           | Used   |      |
+--------------+-------+-----------+--------+------+
| Never Smoker |       |           |        |      |
+--------------+-------+-----------+--------+------+
 
 
 
+---------------------+---+---+---+
| Smokeless Tobacco:  |   |   |   |
| Never Used          |   |   |   |
+---------------------+---+---+---+
 
 
 
+---------------------------------------------------------------+
| Comments: some second hand smoke exposure, but fairly minimal |
 
+---------------------------------------------------------------+
 
 
 
+-------------+-------------+---------+----------+
| Alcohol Use | Drinks/Week | oz/Week | Comments |
+-------------+-------------+---------+----------+
| No          |             |         |          |
+-------------+-------------+---------+----------+
 
 
 
+------------------+---------------+
| Sex Assigned at  | Date Recorded |
| Birth            |               |
+------------------+---------------+
| Not on file      |               |
+------------------+---------------+
 
 
 
+----------------+-------------+-------------+
| Job Start Date | Occupation  | Industry    |
+----------------+-------------+-------------+
| Not on file    | Not on file | Not on file |
+----------------+-------------+-------------+
 
 
 
+----------------+--------------+------------+
| Travel History | Travel Start | Travel End |
+----------------+--------------+------------+
 
 
 
+-------------------------------------+
| No recent travel history available. |
+-------------------------------------+
 documented as of this encounter
 
 Plan of Treatment
 
 
+--------+-----------+------------+----------------------+-------------------+
| Date   | Type      | Specialty  | Care Team            | Description       |
+--------+-----------+------------+----------------------+-------------------+
| / | Office    | Cardiology |   HellalfredoTonia,    |                   |
|    | Visit     |            | ARNP  [address]   |                   |
|        |           |            | St  [place], [place]  |                   |
|        |           |            | [address]  [phone]  |                   |
|        |           |            |  [phone] (Fax)  |                   |
+--------+-----------+------------+----------------------+-------------------+
| / | Hospital  | Radiology  |   Popeye Townsend, |                   |
|    | Encounter |            |  MD  [address] |                   |
|        |           |            |  St.  [place],   |                   |
|        |           |            | [place] [address]             |                   |
|        |           |            | [phone]         |                   |
|        |           |            | [phone] (Fax)   |                   |
+--------+-----------+------------+----------------------+-------------------+
| / | Surgery   | Radiology  |   Popeye Townsend, | CV EP PPM SYSTEM  |
 
|    |           |            |  MD  [address] | IMPLANT           |
|        |           |            |  St.  [place],   |                   |
|        |           |            | [place] [address]             |                   |
|        |           |            | [phone]         |                   |
|        |           |            | [phone] (Fax)   |                   |
+--------+-----------+------------+----------------------+-------------------+
| 02/10/ | Clinical  | Cardiology |                      |                   |
|    | Support   |            |                      |                   |
+--------+-----------+------------+----------------------+-------------------+
| / | Office    | Cardiology |   Tonia Wilson,    |                   |
|    | Visit     |            | ARNP  [address]   |                   |
|        |           |            | [place]  |                   |
|        |           |            | [address]  [phone]  |                   |
|        |           |            |  [phone] (Fax)  |                   |
+--------+-----------+------------+----------------------+-------------------+
| / | Off-Site  | Nephrology |   Marzena Moser  |                   |
|    | Visit     |            | DO ETIENNE  [address] [place] [address]      |                   |
|        |           |            | [address]      |                   |
|        |           |            | [place]      |                   |
|        |           |            | [address]  [phone]  |                   |
|        |           |            |  [phone] (Fax)  |                   |
+--------+-----------+------------+----------------------+-------------------+
 documented as of this encounter
 
 Visit Diagnoses
 
 
+------------------------------------------------------------------------------------------+
| Diagnosis                                                                                |
+------------------------------------------------------------------------------------------+
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
| stage IV, or unspecified(403.90) - Primary  Unspecified hypertensive kidney disease with |
|  chronic kidney disease stage I through stage IV, or unspecified                         |
+------------------------------------------------------------------------------------------+
|   Complications of transplanted kidney                                                   |
+------------------------------------------------------------------------------------------+
|   DIABETES MELLITUS, TYPE II, UNCONTROLLED  Type II or unspecified type diabetes         |
| mellitus without mention of complication, uncontrolled                                   |
+------------------------------------------------------------------------------------------+
|   Hyperlipidemia  Other and unspecified hyperlipidemia                                   |
+------------------------------------------------------------------------------------------+
 documented in this encounter

## 2020-01-08 NOTE — XMS
Encounter Summary
  Created on: 2020
 
 Viviana Ricci
 External Reference #: [id number]
 : 47
 Sex: Female
 
 Demographics
 
 
+-----------------------+----------------------+
| Address               | [address]      |
|                       | [place]  [address] |
+-----------------------+----------------------+
| Home Phone            | [phone]      |
+-----------------------+----------------------+
| Preferred Language    | Unknown              |
+-----------------------+----------------------+
| Marital Status        | Single               |
+-----------------------+----------------------+
| Mandaeism Affiliation | [id number]                 |
+-----------------------+----------------------+
| Race                  | Unknown              |
+-----------------------+----------------------+
| Ethnic Group          | Unknown              |
+-----------------------+----------------------+
 
 
 Author
 
 
+--------------+--------------------------------------------+
| Author       | [facility]  |
|              | and [facility]                                |
+--------------+--------------------------------------------+
| Organization | [facility]  |
|              | and [facility]                                |
+--------------+--------------------------------------------+
| Address      | Unknown                                    |
+--------------+--------------------------------------------+
| Phone        | Unavailable                                |
+--------------+--------------------------------------------+
 
 
 
 Support
 
 
+----------------+--------------+---------------------+-----------------+
| Name           | Relationship | Address             | Phone           |
+----------------+--------------+---------------------+-----------------+
| Ada/Ed Radhames | ECON         | [address]              | [phone] |
|                |              | [place]  |                 |
|                |              |  [id number]              |                 |
+----------------+--------------+---------------------+-----------------+
 
 
 
 
 Care Team Providers
 
 
+-----------------------+------+-------------+
| Care Team Member Name | Role | Phone       |
+-----------------------+------+-------------+
 PCP  | Unavailable |
+-----------------------+------+-------------+
 
 
 
 Encounter Details
 
 
+--------+-----------+----------------------+----------------------+----------------------+
| Date   | Type      | Department           | Care Team            | Description          |
+--------+-----------+----------------------+----------------------+----------------------+
| 08/15/ | Hospital  |   [facility] |   Andres Avina    | Chronic diastolic    |
| 2018   | Encounter |  MED CTR XRAY  [address]    | CHF (congestive      |
|        |           |  [address]       | [address]  [place],  | heart failure), NYHA |
|        |           | [place] [address] | [place] [address]             |  class 3 (HCC)       |
|        |           |   [phone]       | [phone]         |                      |
|        |           |                      | [phone] (Fax)   |                      |
+--------+-----------+----------------------+----------------------+----------------------+
 
 
 
 Social History
 
 
+--------------+-------+-----------+--------+------+
| Tobacco Use  | Types | Packs/Day | Years  | Date |
|              |       |           | Used   |      |
+--------------+-------+-----------+--------+------+
| Never Smoker |       |           |        |      |
+--------------+-------+-----------+--------+------+
 
 
 
+---------------------+---+---+---+
| Smokeless Tobacco:  |   |   |   |
| Never Used          |   |   |   |
+---------------------+---+---+---+
 
 
 
+---------------------------------------------------------------+
| Comments: some second hand smoke exposure, but fairly minimal |
+---------------------------------------------------------------+
 
 
 
+-------------+-------------+---------+----------+
| Alcohol Use | Drinks/Week | oz/Week | Comments |
+-------------+-------------+---------+----------+
| No          |             |         |          |
+-------------+-------------+---------+----------+
 
 
 
 
+------------------+---------------+
| Sex Assigned at  | Date Recorded |
| Birth            |               |
+------------------+---------------+
| Not on file      |               |
+------------------+---------------+
 
 
 
+----------------+-------------+-------------+
| Job Start Date | Occupation  | Industry    |
+----------------+-------------+-------------+
| Not on file    | Not on file | Not on file |
+----------------+-------------+-------------+
 
 
 
+----------------+--------------+------------+
| Travel History | Travel Start | Travel End |
+----------------+--------------+------------+
 
 
 
+-------------------------------------+
| No recent travel history available. |
+-------------------------------------+
 documented as of this encounter
 
 Medications at Time of Discharge
 
 
+----------------------+----------------------+-----------+---------+----------+-----------+
| Medication           | Sig                  | Dispensed | Refills | Start    | End Date  |
|                      |                      |           |         | Date     |           |
+----------------------+----------------------+-----------+---------+----------+-----------+
|   allopurinol        | take 1 tablet by     |   30      | 11      | 20 |           |
| (ZYLOPRIM) 100 mg    | mouth once daily     | tablet    |         | 18       |           |
| tablet               |                      |           |         |          |           |
+----------------------+----------------------+-----------+---------+----------+-----------+
|   aspirin 81 mg EC   | Take 81 mg by mouth  |           | 0       |          |           |
| tablet               | Daily.               |           |         |          |           |
+----------------------+----------------------+-----------+---------+----------+-----------+
|   cholecalciferol    | Take 2,000 Units by  |           | 0       |          |           |
| (VITAMIN D-3) 2000   | mouth Every other    |           |         |          |           |
| UNITS                | day.                 |           |         |          |           |
| TABSIndications:     |                      |           |         |          |           |
| Unspecified          |                      |           |         |          |           |
| hypertensive kidney  |                      |           |         |          |           |
| disease with chronic |                      |           |         |          |           |
|  kidney disease      |                      |           |         |          |           |
| stage I through      |                      |           |         |          |           |
| stage IV, or         |                      |           |         |          |           |
| unspecified(403.90), |                      |           |         |          |           |
|  FSGS (focal         |                      |           |         |          |           |
| segmental            |                      |           |         |          |           |
| glomerulosclerosis), |                      |           |         |          |           |
|  Hyperlipidemia,     |                      |           |         |          |           |
| Complications of     |                      |           |         |          |           |
| transplanted kidney  |                      |           |         |          |           |
 
+----------------------+----------------------+-----------+---------+----------+-----------+
|   glucose blood VI   | Check blood sugar    |   100     | 12      | 20 |           |
| test strips (ONE     | before each meal and | each      |         | 12       |           |
| TOUCH ULTRA TEST)    |  as directed         |           |         |          |           |
| stripIndications:    |                      |           |         |          |           |
| Unspecified          |                      |           |         |          |           |
| hypertensive kidney  |                      |           |         |          |           |
| disease with chronic |                      |           |         |          |           |
|  kidney disease      |                      |           |         |          |           |
| stage I through      |                      |           |         |          |           |
| stage IV, or         |                      |           |         |          |           |
| unspecified(403.90), |                      |           |         |          |           |
|  Complications of    |                      |           |         |          |           |
| transplanted kidney, |                      |           |         |          |           |
|  Diabetes mellitus   |                      |           |         |          |           |
| type II,             |                      |           |         |          |           |
| uncontrolled (HCC),  |                      |           |         |          |           |
| Hyperlipidemia       |                      |           |         |          |           |
+----------------------+----------------------+-----------+---------+----------+-----------+
|   insulin lispro     | inject               |   10 vial | 11      | 11/15/20 |           |
| (HUMALOG) 100        | subcutaneously       |           |         | 17       |           |
| units/mL injection   | BEFORE MEALS         |           |         |          |           |
| (vial)Indications:   | ACCORDING TO SLIDING |           |         |          |           |
| Type 2 diabetes      |  SCALE Max dose 20   |           |         |          |           |
| mellitus with        | units daily          |           |         |          |           |
| complication, with   |                      |           |         |          |           |
| long-term current    |                      |           |         |          |           |
| use of insulin ([facility]) |                      |           |         |          |           |
+----------------------+----------------------+-----------+---------+----------+-----------+
|   lisinopril         | take 1 tablet by     |   90      | 3       | 20 |           |
| (PRINIVIL,ZESTRIL)   | mouth once daily     | tablet    |         | 17       |           |
| 30 MG tablet         |                      |           |         |          |           |
+----------------------+----------------------+-----------+---------+----------+-----------+
|   loperamide         | Take 1 capsule by    |   60      | 5       | 20 |           |
| (ANTI-DIARRHEAL) 2   | mouth 4 times daily  | capsule   |         | 14       |           |
| mg                   | as needed.           |           |         |          |           |
| capsuleIndications:  |                      |           |         |          |           |
| Unspecified          |                      |           |         |          |           |
| hypertensive kidney  |                      |           |         |          |           |
| disease with chronic |                      |           |         |          |           |
|  kidney disease      |                      |           |         |          |           |
| stage I through      |                      |           |         |          |           |
| stage IV, or         |                      |           |         |          |           |
| unspecified(403.90), |                      |           |         |          |           |
|  FSGS (focal         |                      |           |         |          |           |
| segmental            |                      |           |         |          |           |
| glomerulosclerosis), |                      |           |         |          |           |
|  Hypothyroidism,     |                      |           |         |          |           |
| Hyperlipidemia       |                      |           |         |          |           |
+----------------------+----------------------+-----------+---------+----------+-----------+
|   losartan (COZAAR)  | take 1 tablet by     |   90      | 3       | 20 |           |
| 50 mg tablet         | mouth once daily     | tablet    |         | 18       |           |
+----------------------+----------------------+-----------+---------+----------+-----------+
|   Prenatal Vit-Fe    | take 1 tablet by     |   30      | 11      | 20 |           |
| Fumarate-FA (PNV     | mouth once daily.    | tablet    |         | 18       |           |
| PRENATAL PLUS        |                      |           |         |          |           |
| MULTIVITAMIN) 27-1   |                      |           |         |          |           |
| MG TABS              |                      |           |         |          |           |
+----------------------+----------------------+-----------+---------+----------+-----------+
|   Respiratory        | Decrease CPAP to     |   1 each  | 0       | 20 |           |
 
| Therapy Supplies     | 12-18 cmH2O          |           |         | 13       |           |
| MISC                 | Diagnosis            |           |         |          |           |
|                      | Code(s)327.23.       |           |         |          |           |
|                      | Please send order to |           |         |          |           |
|                      |  [facility].     |           |         |          |           |
+----------------------+----------------------+-----------+---------+----------+-----------+
|   B-D INS SYRINGE    | use as directed      |   100     | 11      | 20 |  |
| 0.5CC/31GX5/16 31G X | BEFORE MEALS         | each      |         | 17       | 8         |
|  " 0.5 ML MISC   |                      |           |         |          |           |
+----------------------+----------------------+-----------+---------+----------+-----------+
|   fludrocortisone    | take 1 tablet by     |   90      | 4       | 20 |  |
| (FLORINEF) 0.1 mg    | mouth every other    | tablet    |         | 18       | 9         |
| tablet               | day                  |           |         |          |           |
+----------------------+----------------------+-----------+---------+----------+-----------+
|   furosemide (LASIX) | Take 1 tablet by     |   30      | 5       | 20 |  |
|  40 mg               | mouth Daily as       | tablet    |         | 15       | 8         |
| tabletIndications:   | needed.              |           |         |          |           |
| Unspecified          |                      |           |         |          |           |
| hypertensive kidney  |                      |           |         |          |           |
| disease with chronic |                      |           |         |          |           |
|  kidney disease      |                      |           |         |          |           |
| stage I through      |                      |           |         |          |           |
| stage IV, or         |                      |           |         |          |           |
| unspecified(403.90), |                      |           |         |          |           |
|  FSGS (focal         |                      |           |         |          |           |
| segmental            |                      |           |         |          |           |
| glomerulosclerosis), |                      |           |         |          |           |
|  Hyperlipidemia      |                      |           |         |          |           |
+----------------------+----------------------+-----------+---------+----------+-----------+
|   LANTUS 100 UNIT/ML | inject 20 units      |   10 vial | 11      | 10/27/20 |  |
|  injection (vial)    | subcutaneously every |           |         | 17       | 8         |
|                      |  morning             |           |         |          |           |
+----------------------+----------------------+-----------+---------+----------+-----------+
|   levothyroxine      | take 1 tablet by     |   30      | 11      | 20 |  |
| (SYNTHROID) 50 mcg   | mouth once daily     | tablet    |         | 17       | 8         |
| tablet               |                      |           |         |          |           |
+----------------------+----------------------+-----------+---------+----------+-----------+
|   magnesium oxide    | take 1 tablet by     |   60      | 11      | 20 | 10/02/201 |
| (MAG-OX) 400 mg      | mouth twice a day    | tablet    |         | 17       | 8         |
| tablet               |                      |           |         |          |           |
+----------------------+----------------------+-----------+---------+----------+-----------+
|   metoprolol         | take 1 tablet by     |   60      | 11      | 20 |  |
| tartrate (LOPRESSOR) | mouth twice a day    | tablet    |         | 18       | 9         |
|  25 mg tablet        |                      |           |         |          |           |
+----------------------+----------------------+-----------+---------+----------+-----------+
|   mycophenolate      | Take 250 mg by mouth |           | 0       |          |  |
| (CELLCEPT) 250 mg    |  2 times daily.      |           |         |          | 8         |
| capsule              |                      |           |         |          |           |
+----------------------+----------------------+-----------+---------+----------+-----------+
|   omeprazole         | Take 20 mg by mouth  |           | 0       |          |  |
| (PRILOSEC) 20 mg     | every morning        |           |         |          | 9         |
| capsule              | (before breakfast).  |           |         |          |           |
+----------------------+----------------------+-----------+---------+----------+-----------+
|   predniSONE         | Take 5 mg by mouth   |           | 0       |          | 10/02/201 |
| (DELTASONE) 5 mg     | Daily.               |           |         |          | 8         |
| tablet               |                      |           |         |          |           |
+----------------------+----------------------+-----------+---------+----------+-----------+
|   rosuvastatin       | take 1 tablet by     |   30      | 11      | 20 |  |
| (CRESTOR) 20 mg      | mouth NIGHTLY        | tablet    |         | 17       | 8         |
| tablet               |                      |           |         |          |           |
 
+----------------------+----------------------+-----------+---------+----------+-----------+
|   SENSIPAR 30 MG     | take 1 tablet by     |   30      | 11      | 20 |  |
| tablet               | mouth once daily     | tablet    |         | 18       | 9         |
+----------------------+----------------------+-----------+---------+----------+-----------+
|   tacrolimus         | Take 1 capsule by    |   30      | 11      | 20 | 11/15/201 |
| (PROGRAF) 0.5 mg     | mouth every morning. | capsule   |         | 18       | 8         |
| capsuleIndications:  |  For a total of 1.5  |           |         |          |           |
| Kidney replaced by   | mg, A.M., and 1 mg,  |           |         |          |           |
| transplant           | PM.                  |           |         |          |           |
+----------------------+----------------------+-----------+---------+----------+-----------+
|   tacrolimus         | Take 1 capsule by    |   60      | 11      | 20 | 11/15/201 |
| (PROGRAF) 1 mg       | mouth 2 times daily. | capsule   |         | 18       | 8         |
| capsuleIndications:  |  For a total of 1.5  |           |         |          |           |
| Kidney replaced by   | mg, A.M., and 1 mg,  |           |         |          |           |
| transplant           | PM.                  |           |         |          |           |
+----------------------+----------------------+-----------+---------+----------+-----------+
 documented as of this encounter
 
 Plan of Treatment
 
 
+--------+-----------+------------+----------------------+-------------------+
| Date   | Type      | Specialty  | Care Team            | Description       |
+--------+-----------+------------+----------------------+-------------------+
| / | Office    | Cardiology |   Tonia Wilson,    |                   |
|    | Visit     |            | ARNP  [address]   |                   |
|        |           |            | [place]  |                   |
|        |           |            | [address] [phone]  |                   |
|        |           |            |  [phone] (Fax)  |                   |
+--------+-----------+------------+----------------------+-------------------+
| / | Hospital  | Radiology  |   Popeye Townsend, |                   |
|    | Encounter |            |  MD  [address] |                   |
|        |           |            |  [place]   |                   |
|        |           |            | [place] [address]             |                   |
|        |           |            | [phone]         |                   |
|        |           |            | [phone] (Fax)   |                   |
+--------+-----------+------------+----------------------+-------------------+
| / | Surgery   | Radiology  |   Popeye Townsend, | CV EP PPM SYSTEM  |
|    |           |            |  MD  [address] | IMPLANT           |
|        |           |            |  [place],   |                   |
|        |           |            | [place] [address]             |                   |
|        |           |            | [phone]         |                   |
|        |           |            | [phone] (Fax)   |                   |
+--------+-----------+------------+----------------------+-------------------+
| 02/10/ | Clinical  | Cardiology |                      |                   |
|    | Support   |            |                      |                   |
+--------+-----------+------------+----------------------+-------------------+
| / | Office    | Cardiology |   Tonia Wilson,    |                   |
|    | Visit     |            | ARNP  [address]   |                   |
|        |           |            | [place]  |                   |
|        |           |            | [address]  [phone]  |                   |
|        |           |            |  [phone] (Fax)  |                   |
+--------+-----------+------------+----------------------+-------------------+
| / | Off-Site  | Nephrology |   Shanti Marzena  |                   |
|    | Visit     |            | DO ETIENNE  [address] [place] [address]      |                   |
|        |           |            | [address]      |                   |
|        |           |            | [place]      |                   |
|        |           |            | [address]  [phone]  |                   |
|        |           |            |  [phone] (Fax)  |                   |
+--------+-----------+------------+----------------------+-------------------+
 
 documented as of this encounter
 
 Procedures
 
 
+------------------+--------+-------------+----------------------+----------------------+
| Procedure Name   | Priori | Date/Time   | Associated Diagnosis | Comments             |
|                  | ty     |             |                      |                      |
+------------------+--------+-------------+----------------------+----------------------+
| XR CHEST PA AND  | Routin | 08/15/2018  |   Chronic diastolic  |   Results for this   |
| LATERAL          | e      | 12:28 PM    | CHF (congestive      | procedure are in the |
|                  |        | PDT         | heart failure), NYHA |  results section.    |
|                  |        |             |  class 3 (HCC)       |                      |
+------------------+--------+-------------+----------------------+----------------------+
 documented in this encounter
 
 Results
 XR Chest PA and Lateral (08/15/2018 12:28 PM PDT)
 
+----------+
| Specimen |
+----------+
|          |
+----------+
 
 
 
+------------------------------------------------------------------------+---------------+
| Narrative                                                              | Performed At  |
+------------------------------------------------------------------------+---------------+
|   CLINICAL INFORMATION: under-treated partially compensated CHF.       |   [facility] |
| COMPARISON: 3 2016.     FINDINGS:   Frontal and lateral views of the   |               |
| chest.      Median sternotomy changes.     Lungs: No focal airspace    |               |
| disease, pleural effusion, or pneumothorax.   Minimal  bilateral       |               |
| basilar dependent atelectasis versus scarring.     Heart/mediastinum:  |               |
| Borderline cardiomegaly. No mediastinal widening.     Bones: No acute  |               |
| osseous abnormality appreciated.     IMPRESSION - Borderline           |               |
| cardiomegaly.   No acute pulmonary disease.     Dictated and Signed    |               |
| by: Jorge Thomas MD    Electronically signed: 8/15/2018 2:16 PM    |               |
+------------------------------------------------------------------------+---------------+
 
 
 
+------------------------------------------------------------------------------------------+
| Procedure Note                                                                           |
+------------------------------------------------------------------------------------------+
|   Ralf, Rad Results In - 08/15/2018  2:19 PM PDT  CLINICAL INFORMATION: under-treated     |
| partially compensated CHF.COMPARISON: 3 2016.FINDINGS: Frontal and lateral views of the  |
| chest. Median sternotomy changes.Lungs: No focal airspace disease, pleural effusion, or  |
| pneumothorax.  Minimalbilateral basilar dependent atelectasis versus                     |
| scarring.Heart/mediastinum: Borderline cardiomegaly. No mediastinal widening.Bones: No   |
| acute osseous abnormality appreciated.IMPRESSION - Borderline cardiomegaly.  No acute    |
| pulmonary disease.Dictated and Signed by: Jorge Thomas MD  Electronically signed:     |
| 8/15/2018 2:16 PM                                                                        |
|                                                                                          |
|Lungs: No focal airspace disease, pleural effusion, or pneumothorax.  Minimal             |
|bilateral basilar dependent atelectasis versus scarring.                                  |
|                                                                                          |
|Heart/mediastinum: Borderline cardiomegaly. No mediastinal widening.                      |
|                                                                                          |
 
|Bones: No acute osseous abnormality appreciated.                                          |
|                                                                                          |
|IMPRESSION - Borderline cardiomegaly.  No acute pulmonary disease.                        |
|                                                                                          |
|Dictated and Signed by: Jorge Thomas MD                                                |
| Electronically signed: 8/15/2018 2:16 PM                                                 |
+------------------------------------------------------------------------------------------+
 
 
 
+---------------+---------+--------------------+--------------+
| Performing    | Address | City/State/Zipcode | Phone Number |
| Organization  |         |                    |              |
+---------------+---------+--------------------+--------------+
|   [facility] |         |                    |              |
+---------------+---------+--------------------+--------------+
 documented in this encounter
 
 Visit Diagnoses
 
 
+------------------------------------------------------------------------+
| Diagnosis                                                              |
+------------------------------------------------------------------------+
|   Chronic diastolic CHF (congestive heart failure), NYHA class 3 (HCC) |
+------------------------------------------------------------------------+
 documented in this encounter

## 2020-01-08 NOTE — XMS
Encounter Summary
  Created on: 2020
 
 Viviana Ricci
 External Reference #: [id number]
 : 47
 Sex: Female
 
 Demographics
 
 
+-----------------------+----------------------+
| Address               | [address]      |
|                       | [place]  [address] |
+-----------------------+----------------------+
| Home Phone            | [phone]      |
+-----------------------+----------------------+
| Preferred Language    | Unknown              |
+-----------------------+----------------------+
| Marital Status        | Single               |
+-----------------------+----------------------+
| Restorationism Affiliation | [id number]                 |
+-----------------------+----------------------+
| Race                  | Unknown              |
+-----------------------+----------------------+
| Ethnic Group          | Unknown              |
+-----------------------+----------------------+
 
 
 Author
 
 
+--------------+--------------------------------------------+
| Author       | [facility]  |
|              | and [place]                                |
+--------------+--------------------------------------------+
| Organization | [facility]  |
|              | and [place]                                |
+--------------+--------------------------------------------+
| Address      | Unknown                                    |
+--------------+--------------------------------------------+
| Phone        | Unavailable                                |
+--------------+--------------------------------------------+
 
 
 
 Support
 
 
+----------------+--------------+---------------------+-----------------+
| Name           | Relationship | Address             | Phone           |
+----------------+--------------+---------------------+-----------------+
| Ada/Ed Radhames | ECON         | [address]              | [phone] |
|                |              | [place]  |                 |
|                |              |  [address]              |                 |
+----------------+--------------+---------------------+-----------------+
 
 
 
 
 Care Team Providers
 
 
+-----------------------+------+-------------+
| Care Team Member Name | Role | Phone       |
+-----------------------+------+-------------+
 PCP  | Unavailable |
+-----------------------+------+-------------+
 
 
 
 Encounter Details
 
 
+--------+-----------+----------------------+----------------------+-------------+
| Date   | Type      | Department           | Care Team            | Description |
+--------+-----------+----------------------+----------------------+-------------+
| / | [facility]  |   [facility] |   Marzena Moser  |             |
|    | Encounter |  MED CTR XRAY  [address] | M, DO  [address]      |             |
|        |           |  [address] [place]       | [address]      |             |
|        |           | [place] [address] | [place]      |             |
|        |           |   [phone]       | [address] [phone]  |             |
|        |           |                      |  [phone] (Fax)  |             |
+--------+-----------+----------------------+----------------------+-------------+
 
 
 
 Social History
 
 
+----------------+-------+-----------+--------+------+
| Tobacco Use    | Types | Packs/Day | Years  | Date |
|                |       |           | Used   |      |
+----------------+-------+-----------+--------+------+
| Never Assessed |       |           |        |      |
+----------------+-------+-----------+--------+------+
 
 
 
+------------------+---------------+
| Sex Assigned at  | Date Recorded |
| Birth            |               |
+------------------+---------------+
| Not on file      |               |
+------------------+---------------+
 
 
 
+----------------+-------------+-------------+
| Job Start Date | Occupation  | Industry    |
+----------------+-------------+-------------+
| Not on file    | Not on file | Not on file |
+----------------+-------------+-------------+
 
 
 
+----------------+--------------+------------+
| Travel History | Travel Start | Travel End |
+----------------+--------------+------------+
 
 
 
 
+-------------------------------------+
| No recent travel history available. |
+-------------------------------------+
 documented as of this encounter
 
 Plan of Treatment
 
 
+--------+-----------+------------+----------------------+-------------------+
| Date   | Type      | Specialty  | Care Team            | Description       |
+--------+-----------+------------+----------------------+-------------------+
| / | Office    | Cardiology |   Tonia Wilson,    |                   |
|    | Visit     |            | ARNJESSICA  [address]   |                   |
|        |           |            | St  [place], [place]  |                   |
|        |           |            | [address]  [phone]  |                   |
|        |           |            |  [phone] (Fax)  |                   |
+--------+-----------+------------+----------------------+-------------------+
| / | Hospital  | Radiology  |   Popeye Townsend, |                   |
|    | Encounter |            |  MD  [address] |                   |
|        |           |            |  [place],   |                   |
|        |           |            | [place] [address]             |                   |
|        |           |            | [phone]         |                   |
|        |           |            | [phone] (Fax)   |                   |
+--------+-----------+------------+----------------------+-------------------+
| / | Surgery   | Radiology  |   Popeye Townsend, | CV EP PPM SYSTEM  |
|    |           |            |  MD  [address] | IMPLANT           |
|        |           |            |  [place],   |                   |
|        |           |            | [place] [address]             |                   |
|        |           |            | [phone]         |                   |
|        |           |            | [phone] (Fax)   |                   |
+--------+-----------+------------+----------------------+-------------------+
| 02/10/ | Clinical  | Cardiology |                      |                   |
|    | Support   |            |                      |                   |
+--------+-----------+------------+----------------------+-------------------+
| / | Office    | Cardiology |   Tonia Wilson,    |                   |
|    | Visit     |            | BELKIS  [address]   |                   |
|        |           |            | [place]  |                   |
|        |           |            | [address]  [phone]  |                   |
|        |           |            |  [phone] (Fax)  |                   |
+--------+-----------+------------+----------------------+-------------------+
| / | Off-Site  | Nephrology |   Marzena Moser  |                   |
|    | Visit     |            | DO ETIENNE  [address] [place] [address]      |                   |
|        |           |            | [address]      |                   |
|        |           |            | [place]      |                   |
|        |           |            | [address] [phone]  |                   |
|        |           |            |  [phone] (Fax)  |                   |
+--------+-----------+------------+----------------------+-------------------+
 documented as of this encounter
 
 Visit Diagnoses
 Not on filedocumented in this encounter

## 2020-01-08 NOTE — XMS
Encounter Summary
  Created on: 2020
 
 Viviana Ricci
 External Reference #: [id number]
 : 47
 Sex: Female
 
 Demographics
 
 
+-----------------------+----------------------+
| Address               | [address]      |
|                       | [place]  [address] |
+-----------------------+----------------------+
| Home Phone            | [phone]      |
+-----------------------+----------------------+
| Preferred Language    | Unknown              |
+-----------------------+----------------------+
| Marital Status        | Single               |
+-----------------------+----------------------+
| Episcopal Affiliation | [id number]                 |
+-----------------------+----------------------+
| Race                  | Unknown              |
+-----------------------+----------------------+
| Ethnic Group          | Unknown              |
+-----------------------+----------------------+
 
 
 Author
 
 
+--------------+--------------------------------------------+
| Author       | [facility]  |
|              | and [place]                                |
+--------------+--------------------------------------------+
| Organization | [facility]  |
|              | and [place]                                |
+--------------+--------------------------------------------+
| Address      | Unknown                                    |
+--------------+--------------------------------------------+
| Phone        | Unavailable                                |
+--------------+--------------------------------------------+
 
 
 
 Support
 
 
+----------------+--------------+---------------------+-----------------+
| Name           | Relationship | Address             | Phone           |
+----------------+--------------+---------------------+-----------------+
| Ada/Ed Radhames | ECON         | [address]              | [phone] |
|                |              | [place]  |                 |
|                |              |  [address]              |                 |
+----------------+--------------+---------------------+-----------------+
 
 
 
 
 Care Team Providers
 
 
+-----------------------+------+-------------+
| Care Team Member Name | Role | Phone       |
+-----------------------+------+-------------+
 PCP  | Unavailable |
+-----------------------+------+-------------+
 
 
 
 Encounter Details
 
 
+--------+-----------+----------------------+----------------------+-------------+
| Date   | Type      | Department           | Care Team            | Description |
+--------+-----------+----------------------+----------------------+-------------+
| / | [facility]  |   [facility] |   Marzena Moser  |             |
|    | Encounter |  MED CTR XRAY  [address] | M, DO  [address]      |             |
|        |           |  [place]       | [place]      |             |
|        |           | [place] [address] | [place]      |             |
|        |           |   [phone]       | [address] [phone]  |             |
|        |           |                      |  [phone] (Fax)  |             |
+--------+-----------+----------------------+----------------------+-------------+
 
 
 
 Social History
 
 
+----------------+-------+-----------+--------+------+
| Tobacco Use    | Types | Packs/Day | Years  | Date |
|                |       |           | Used   |      |
+----------------+-------+-----------+--------+------+
| Never Assessed |       |           |        |      |
+----------------+-------+-----------+--------+------+
 
 
 
+------------------+---------------+
| Sex Assigned at  | Date Recorded |
| Birth            |               |
+------------------+---------------+
| Not on file      |               |
+------------------+---------------+
 
 
 
+----------------+-------------+-------------+
| Job Start Date | Occupation  | Industry    |
+----------------+-------------+-------------+
| Not on file    | Not on file | Not on file |
+----------------+-------------+-------------+
 
 
 
+----------------+--------------+------------+
| Travel History | Travel Start | Travel End |
+----------------+--------------+------------+
 
 
 
 
+-------------------------------------+
| No recent travel history available. |
+-------------------------------------+
 documented as of this encounter
 
 Plan of Treatment
 
 
+--------+-----------+------------+----------------------+-------------------+
| Date   | Type      | Specialty  | Care Team            | Description       |
+--------+-----------+------------+----------------------+-------------------+
| / | Office    | Cardiology |   Tonia Wilson,    |                   |
|    | Visit     |            | ARNJESSICA  [address]   |                   |
|        |           |            | St  [place], [place]  |                   |
|        |           |            | [address]  [phone]  |                   |
|        |           |            |  [phone] (Fax)  |                   |
+--------+-----------+------------+----------------------+-------------------+
| / | Hospital  | Radiology  |   Popeye Townsend, |                   |
|    | Encounter |            |  MD  [address] [place] |                   |
|        |           |            |  St[place],   |                   |
|        |           |            | [place] [address]             |                   |
|        |           |            | [phone]         |                   |
|        |           |            | [phone] (Fax)   |                   |
+--------+-----------+------------+----------------------+-------------------+
| / | Surgery   | Radiology  |   Popeye Townsend, | CV EP PPM SYSTEM  |
|    |           |            |  MD  [address] | IMPLANT           |
|        |           |            |  [place],   |                   |
|        |           |            | [place] [address]             |                   |
|        |           |            | [phone]         |                   |
|        |           |            | [phone] (Fax)   |                   |
+--------+-----------+------------+----------------------+-------------------+
| 02/10/ | Clinical  | Cardiology |                      |                   |
|    | Support   |            |                      |                   |
+--------+-----------+------------+----------------------+-------------------+
| / | Office    | Cardiology |   Tonia Wilson,    |                   |
|    | Visit     |            | BELKIS  [address]   |                   |
|        |           |            | [place]  |                   |
|        |           |            | [address]  [phone]  |                   |
|        |           |            |  [phone] (Fax)  |                   |
+--------+-----------+------------+----------------------+-------------------+
| / | Off-Site  | Nephrology |   Marzena Moser  |                   |
|    | Visit     |            | DO ETIENNE  [address] [place] [address]      |                   |
|        |           |            | [address]      |                   |
|        |           |            | [place]      |                   |
|        |           |            | [address] [phone]  |                   |
|        |           |            |  [phone] (Fax)  |                   |
+--------+-----------+------------+----------------------+-------------------+
 documented as of this encounter
 
 Visit Diagnoses
 Not on filedocumented in this encounter

## 2020-01-08 NOTE — XMS
Encounter Summary
  Created on: 2020
 
 Viviana Ricci
 External Reference #: [id number]
 : 47
 Sex: Female
 
 Demographics
 
 
+-----------------------+----------------------+
| Address               | [address]      |
|                       | [place]  [address] |
+-----------------------+----------------------+
| Home Phone            | [phone]      |
+-----------------------+----------------------+
| Preferred Language    | Unknown              |
+-----------------------+----------------------+
| Marital Status        | Single               |
+-----------------------+----------------------+
| Orthodoxy Affiliation | [id number]                 |
+-----------------------+----------------------+
| Race                  | Unknown              |
+-----------------------+----------------------+
| Ethnic Group          | Unknown              |
+-----------------------+----------------------+
 
 
 Author
 
 
+--------------+--------------------------------------------+
| Author       | [facility]  |
|              | and [facility]                                |
+--------------+--------------------------------------------+
| Organization | [facility]  |
|              | and [facility]                                |
+--------------+--------------------------------------------+
| Address      | Unknown                                    |
+--------------+--------------------------------------------+
| Phone        | Unavailable                                |
+--------------+--------------------------------------------+
 
 
 
 Support
 
 
+----------------+--------------+---------------------+-----------------+
| Name           | Relationship | Address             | Phone           |
+----------------+--------------+---------------------+-----------------+
| Ada/Ed Radhames | ECON         | [address]              | [phone] |
|                |              | [place]  |                 |
|                |              |  [id number]              |                 |
+----------------+--------------+---------------------+-----------------+
 
 
 
 
 Care Team Providers
 
 
+-----------------------+------+-------------+
| Care Team Member Name | Role | Phone       |
+-----------------------+------+-------------+
 PCP  | Unavailable |
+-----------------------+------+-------------+
 
 
 
 Reason for Visit
 
 
+-------------------+----------+
| Reason            | Comments |
+-------------------+----------+
| Medication Refill |          |
+-------------------+----------+
 
 
 
 Encounter Details
 
 
+--------+--------+---------------------+----------------------+-------------------+
| Date   | Type   | Department          | Care Team            | Description       |
+--------+--------+---------------------+----------------------+-------------------+
| / | Refill |   [facility]         |   Marzena Moser  | Medication Refill |
| 2017   |        | NEPHROLOGY  [address]   | M, DO  [address]      |                   |
|        |        | [address]   | [address]      |                   |
|        |        | [place], [place]     | [place], [place]      |                   |
|        |        | [address]          | [address]  [phone]  |                   |
|        |        | [phone]        |  [phone] (Fax)  |                   |
+--------+--------+---------------------+----------------------+-------------------+
 
 
 
 Social History
 
 
+--------------+-------+-----------+--------+------+
| Tobacco Use  | Types | Packs/Day | Years  | Date |
|              |       |           | Used   |      |
+--------------+-------+-----------+--------+------+
| Never Smoker |       |           |        |      |
+--------------+-------+-----------+--------+------+
 
 
 
+---------------------+---+---+---+
| Smokeless Tobacco:  |   |   |   |
| Never Used          |   |   |   |
+---------------------+---+---+---+
 
 
 
+---------------------------------------------------------------+
| Comments: some second hand smoke exposure, but fairly minimal |
 
+---------------------------------------------------------------+
 
 
 
+-------------+-------------+---------+----------+
| Alcohol Use | Drinks/Week | oz/Week | Comments |
+-------------+-------------+---------+----------+
| No          |             |         |          |
+-------------+-------------+---------+----------+
 
 
 
+------------------+---------------+
| Sex Assigned at  | Date Recorded |
| Birth            |               |
+------------------+---------------+
| Not on file      |               |
+------------------+---------------+
 
 
 
+----------------+-------------+-------------+
| Job Start Date | Occupation  | Industry    |
+----------------+-------------+-------------+
| Not on file    | Not on file | Not on file |
+----------------+-------------+-------------+
 
 
 
+----------------+--------------+------------+
| Travel History | Travel Start | Travel End |
+----------------+--------------+------------+
 
 
 
+-------------------------------------+
| No recent travel history available. |
+-------------------------------------+
 documented as of this encounter
 
 Plan of Treatment
 
 
+--------+-----------+------------+----------------------+-------------------+
| Date   | Type      | Specialty  | Care Team            | Description       |
+--------+-----------+------------+----------------------+-------------------+
| / | Office    | Cardiology |   HellalfredoTonia,    |                   |
|    | Visit     |            | ARNP  [address]   |                   |
|        |           |            | St  [place], [place]  |                   |
|        |           |            | [address]  [phone]  |                   |
|        |           |            |  [phone] (Fax)  |                   |
+--------+-----------+------------+----------------------+-------------------+
| / | Hospital  | Radiology  |   Popeye Townsend, |                   |
|    | Encounter |            |  MD  [address] |                   |
|        |           |            |  St.  [place],   |                   |
|        |           |            | [place] [address]             |                   |
|        |           |            | [phone]         |                   |
|        |           |            | [phone] (Fax)   |                   |
+--------+-----------+------------+----------------------+-------------------+
| / | Surgery   | Radiology  |   Popeye Townsend, | CV EP PPM SYSTEM  |
 
|    |           |            |  MD  [address] | IMPLANT           |
|        |           |            |  St.  [place],   |                   |
|        |           |            | [place] [address]             |                   |
|        |           |            | [phone]         |                   |
|        |           |            | [phone] (Fax)   |                   |
+--------+-----------+------------+----------------------+-------------------+
| 02/10/ | Clinical  | Cardiology |                      |                   |
|    | Support   |            |                      |                   |
+--------+-----------+------------+----------------------+-------------------+
| / | Office    | Cardiology |   Tonia Wilson,    |                   |
|    | Visit     |            | ARNP  [address]   |                   |
|        |           |            | [place]  |                   |
|        |           |            | [address]  [phone]  |                   |
|        |           |            |  [phone] (Fax)  |                   |
+--------+-----------+------------+----------------------+-------------------+
| / | Off-Site  | Nephrology |   Marzena Moser  |                   |
|    | Visit     |            | DO ETIENNE  [address] [place] [address]      |                   |
|        |           |            | [address]      |                   |
|        |           |            | [place]      |                   |
|        |           |            | [address]  [phone]  |                   |
|        |           |            |  [phone] (Fax)  |                   |
+--------+-----------+------------+----------------------+-------------------+
 documented as of this encounter
 
 Visit Diagnoses
 Not on filedocumented in this encounter

## 2020-01-08 NOTE — XMS
Encounter Summary
  Created on: 2020
 
 Viviana Ricci
 External Reference #: [id number]
 : 47
 Sex: Female
 
 Demographics
 
 
+-----------------------+----------------------+
| Address               | [address]      |
|                       | [place]  [address] |
+-----------------------+----------------------+
| Home Phone            | [phone]      |
+-----------------------+----------------------+
| Preferred Language    | Unknown              |
+-----------------------+----------------------+
| Marital Status        | Single               |
+-----------------------+----------------------+
| Yazdanism Affiliation | [id number]                 |
+-----------------------+----------------------+
| Race                  | Unknown              |
+-----------------------+----------------------+
| Ethnic Group          | Unknown              |
+-----------------------+----------------------+
 
 
 Author
 
 
+--------------+--------------------------------------------+
| Author       | [facility]  |
|              | and [facility]                                |
+--------------+--------------------------------------------+
| Organization | [facility]  |
|              | and [facility]                                |
+--------------+--------------------------------------------+
| Address      | Unknown                                    |
+--------------+--------------------------------------------+
| Phone        | Unavailable                                |
+--------------+--------------------------------------------+
 
 
 
 Support
 
 
+----------------+--------------+---------------------+-----------------+
| Name           | Relationship | Address             | Phone           |
+----------------+--------------+---------------------+-----------------+
| Ada/Ed Radhames | ECON         | [address]              | [phone] |
|                |              | [place]  |                 |
|                |              |  [address]              |                 |
+----------------+--------------+---------------------+-----------------+
 
 
 
 
 Care Team Providers
 
 
+-----------------------+------+-------------+
| Care Team Member Name | Role | Phone       |
+-----------------------+------+-------------+
 PCP  | Unavailable |
+-----------------------+------+-------------+
 
 
 
 Reason for Visit
 
 
+-------------------+----------+
| Reason            | Comments |
+-------------------+----------+
| Kidney Transplant |          |
+-------------------+----------+
 
 
 
 Encounter Details
 
 
+--------+---------+---------------------+----------------------+----------------------+
| Date   | Type    | Department          | Care Team            | Description          |
+--------+---------+---------------------+----------------------+----------------------+
| / | Office  |   [facility]         |   ChengRosa lillyias  | Complications of     |
|    | Visit   | NEPHROLOGY  [address]   | M, DO  [address]      | transplanted kidney  |
|        |         | [address]   | [place]      | (Primary Dx);        |
|        |         | [place], [place]     | [place], [place]      | Unspecified          |
|        |         | [address]          | [address]  [phone]  | hypertensive kidney  |
|        |         | [phone]        |  [phone] (Fax)  | disease with chronic |
|        |         |                     |                      |  kidney disease      |
|        |         |                     |                      | stage I through      |
|        |         |                     |                      | stage IV, or         |
|        |         |                     |                      | unspecified;         |
|        |         |                     |                      | DIABETES MELLITUS,   |
|        |         |                     |                      | TYPE II,             |
|        |         |                     |                      | UNCONTROLLED;        |
|        |         |                     |                      | Hyperlipidemia       |
+--------+---------+---------------------+----------------------+----------------------+
 
 
 
 Social History
 
 
+--------------+-------+-----------+--------+------+
| Tobacco Use  | Types | Packs/Day | Years  | Date |
|              |       |           | Used   |      |
+--------------+-------+-----------+--------+------+
| Never Smoker |       |           |        |      |
+--------------+-------+-----------+--------+------+
 
 
 
+---------------------+---+---+---+
 
| Smokeless Tobacco:  |   |   |   |
| Never Used          |   |   |   |
+---------------------+---+---+---+
 
 
 
+-------------+-------------+---------+----------+
| Alcohol Use | Drinks/Week | oz/Week | Comments |
+-------------+-------------+---------+----------+
| No          |             |         |          |
+-------------+-------------+---------+----------+
 
 
 
+------------------+---------------+
| Sex Assigned at  | Date Recorded |
| Birth            |               |
+------------------+---------------+
| Not on file      |               |
+------------------+---------------+
 
 
 
+----------------+-------------+-------------+
| Job Start Date | Occupation  | Industry    |
+----------------+-------------+-------------+
| Not on file    | Not on file | Not on file |
+----------------+-------------+-------------+
 
 
 
+----------------+--------------+------------+
| Travel History | Travel Start | Travel End |
+----------------+--------------+------------+
 
 
 
+-------------------------------------+
| No recent travel history available. |
+-------------------------------------+
 documented as of this encounter
 
 Last Filed Vital Signs
 
 
+-------------------+---------------------+----------------------+----------+
| Vital Sign        | Reading             | Time Taken           | Comments |
+-------------------+---------------------+----------------------+----------+
| Blood Pressure    | 108/68              | 2012  1:14 PM  |          |
|                   |                     | PST                  |          |
+-------------------+---------------------+----------------------+----------+
| Pulse             | 64                  | 2012  1:14 PM  |          |
|                   |                     | PST                  |          |
+-------------------+---------------------+----------------------+----------+
| Temperature       | 36.9   C (98.5   F) | 2012  1:14 PM  |          |
|                   |                     | PST                  |          |
+-------------------+---------------------+----------------------+----------+
| Respiratory Rate  | -                   | -                    |          |
+-------------------+---------------------+----------------------+----------+
| Oxygen Saturation | -                   | -                    |          |
 
+-------------------+---------------------+----------------------+----------+
| Inhaled Oxygen    | -                   | -                    |          |
| Concentration     |                     |                      |          |
+-------------------+---------------------+----------------------+----------+
| Weight            | 132.2 kg (291 lb 8  | 2012  1:14 PM  |          |
|                   | oz)                 | PST                  |          |
+-------------------+---------------------+----------------------+----------+
| Height            | 167.6 cm (5' 6")    | 2012  1:14 PM  |          |
|                   |                     | PST                  |          |
+-------------------+---------------------+----------------------+----------+
| Body Mass Index   | 47.05               | 2012  1:14 PM  |          |
|                   |                     | PST                  |          |
+-------------------+---------------------+----------------------+----------+
 documented in this encounter
 
 Patient Instructions
 Patient Instructions Cherelle Julian RN - 2012  1:21 PM PSTFormatting of this 
note might be different from the original.
 2012 
 
 Viviana LEMA Radhames
 [address] [place] [address]
 
 Dear Viviana:
 
 Thank you for enrolling in [employer]. Please follow the instructions below to view your [employer] online medical record. [employer] allows you to send secure messages to your doctor, view you
r test results, renew your prescriptions, schedule appointments, and more.
 
  How Do I Sign Up?
 1. In your Internet browser, go to [email or web address]
 2. Click on the Sign Up Now link in the Sign In box.This will take you to the New Member Si
gn Up page.
 3. Enter your [employer] access code exactly as it appears below. You will not need to use thi
s code after you sign up. If you do not sign up before the expiration date, you must request
 a new code through your [facility].
 
 [employer] Access Code: [email or web address]
 Expires: 2013 13:21
 
 4. Fill in the last four digits of your Social Security Number (xxxx) and Date of Birth (mm
/dd/yyyy) when asked and click Submit. You will now be asked to create a [employer] ID.
 5. Create a Logentriest ID. This will be your [employer] login ID. Your login ID cannot be changed
, so think of one that is secure and easy to remember.
 6. Create a [employer] password. You can change your password at any time.
 7. Enter your Password Reset Question and Answer. This can be used at a later time if you f
orget your password. 
 8. Enter your e-mail address. You will receive e-mail notification when new information is 
available in [employer].
 9. Click Sign Up. You may now view your medical record.
 
  Additional Information
 If you have questions, you can email [email or web address] or call 
[email or web address] to talk to [facility] team. Please remember, [employer] should NOT be used fo
r urgent needs. For all medical emergencies, call 911.  
 
 Sincerely,
 
 Marzena Moser DO 
 
 Electronically signed by Cherelle Julian RN at 2012  1:22 PM PST
 documented in this encounter
 
 Progress Notes
 Marzena Moser DO - 2012 12:31 PM PSTFormatting of this note might be different
 from the original.
 Subjective: 
  
 Patient ID: Viviana Ricci is a 67 y.o. female.
 
 HPI Comments: 
 Followup for this 67 year old white female s/p renal allograft, 05, with previous allo
graft dysfunction secondary to FSGS, also with Type II DM, hypertension, hypothyroidism, hyp
erlipidemia, 
 SHPTH,  previous low back pain, obesity/JACK, chronic pulmonary  hypertension, possibly rela
jeanie to obesity?, and  CAD, s/p CABG x3 vessels,.   She states that she feels fairly well
.
 She denies dysuria, cough, and is taking fluids well.
 
 MEDS:
   
 furosemide (LASIX) 40 mg tablet, Take two tablets by mouth daily., Disp: 60 tablet, Rfl: 5
 rosuvastatin (CRESTOR) 40 MG tablet, Take 40 mg by mouth nightly.  , Disp: , Rfl: 
 (PROGRAF)--2.0 mg, AM , and 1.5 mg, PM. ,  
 insulin glargine (LANTUS) 100 units/mL injection, Inject 20 Units under the skin every morn
ing.
 cinacalcet (SENSIPAR) 30 mg tablet, Take 1 tablet by mouth Daily., Disp: 30 tablet, Rfl: 12
 fludrocortisone (FLORINEF) 0.1 mg tablet, Take 1 tablet by mouth Every other day.,  
 levothyroxine (LEVOTHROID) 50 mcg tablet, Take 1 tablet by mouth Daily., Disp: 30 tablet, R
fl: 11
 metoprolol tartrate (LOPRESSOR) 25 mg tablet, Take 25 mg by mouth 2 times daily., Disp: , R
fl: 
 Prenatal Multivit-Min-Fe-FA (PRENATAL VITAMINS) 0.8 MG TABS, Take 0.8 mg by mouth Daily., D
isp: , Rfl: 
 predniSONE (DELTASONE) 5 mg tablet, Take 5 mg by mouth Daily., Disp: , Rfl: 
 valsartan (DIOVAN) 160 mg tablet, Take 160 mg by mouth Daily., Disp: , Rfl: 
 omeprazole (PRILOSEC) 20 mg capsule, Take one capsule by mouth once daily on an empty stoma
ch, Disp: , Rfl: 
 allopurinol (ZYLOPRIM) 100 mg tablet, Take 100 mg by mouth Daily., Disp: , Rfl: 
 aspirin 81 MG EC tablet, Take 81 mg by mouth Daily., Disp: , Rfl: 
 loperamide (ANTI-DIARRHEAL) 2 mg capsule, Take 2 mg by mouth Daily as needed., Disp: , Rfl:
 
 mycophenolate (CELLCEPT) 250 mg capsule, Take 250 mg by mouth 3 times daily., Disp: , Rfl: 
 lisinopril (PRINIVIL,ZESTRIL) 30 MG tablet, Take 30 mg by mouth Daily., Disp: , Rfl: 
 niacin (NIASPAN) 500 mg CR tablet, Not taking, Disp: , Rfl: 
 Insulin Syringe-Needle U-100 (BD INSULIN SYRINGE ULTRAFINE) 31G X 5/16" 0.5 ML MISC, Use to
 inject insulin subcutaneously before meals or as directed, Disp: , Rfl: 
 insulin lispro (HUMALOG) 100 units/mL injection, Inject subcutaneously before meals accordi
ng to sliding scale, Disp: , Rfl: 
 Cholecalciferol (VITAMIN D3) 5000 UNITS CAPS, Take 5,000 Units by mouth Once a week., Disp:
 , Rfl: 
 magnesium oxide (MAG-OX) 400 mg tablet, Take 400 mg by mouth 2 times daily .
  Review of Systems
 
 Allergies no known allergies
 
 Objective:  /68 | Pulse 64 | Temp(Src) 36.9 C (98.5 F) (Oral) | Ht 1.676 m (5' 6"
) | Wt 132.224 kg (291 lb 8 oz) | BMI 47.05 kg/m2
  
 Physical Exam
 
 
 HEENT: No thrush.
 Heart:  Regular rate and rhythm with no S3, S4, murmur or rub.
 Lungs:  CTA bilaterally.  No rales or wheezes.
 Abdomen:  Soft, flat,  Renal allograft in RLQ is nontender, normoactive bowel sounds.
 Extremities: No clubbing, cyanosis, or edema. No  asterixis.  No foot ulcers.
 
 UA: SG 1.010, pH 5.0, 2+ protein, no blood, no glucose.
 
 LAB:   BUN 33, Cr 1.14, K+ 5.1, HCO3 21, glucose 140, HbA1c 8.1%, Mg++ 1.7, P04 2.7, PTH 20
7, , HDL 43, LDL 77, , to be DC 3.1, Hb 12.8, PLT 1 77,000, tacrolimus = 7.3 ng/
ml.
 
 Assessment: 
 
 1.  Renal Allograft--Scr is at baseline.
 2.  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
 3.  Type 2 DM--worsening control.
 4.  Hyperlipidemia--stable.
 5.  Hypertension--good control.
 6.  SHPTH--about same.
 7.  Obesity/JACK/Pulmonary hypertension--stable clinically.
 8.  CAD, s/p CABG, 1997--stable on ASA, metoprolol, atorvastatin.
 9.  Type IV RTA--stable.
 10. Chronic Low Back pain--in remission.
 
 Plan: 
 
 1.  I discussed with Viviana that her allograft, and tacrolimus levels are stable.  The FSGS d
oes not appear to be worsening over the last year.
 2.  I discussed increasing her Lantus by 10%, however she states that she may have been les
s fastidious about glycemic control last 45 days.  She promises to do more frequent monitori
ng and would like to reevaluate it later.
 3.    I told her that I think it would be a good idea to find a local PCP, or internist, an
d he'll to help her on a monthly basis assist with monitoring and her glycemic control.
 4.  Will plan to see her back in 4 months.  She will continue to have her standing order, a
nd drug level every 3 months.
 
 CC:   Jose David Dlaal M.D., [facility], [facility] NetworkElectronically signed by Marzena Moser DO at 2012  1:24 PM PSTdocumented in this encounter
 
 Plan of Treatment
 
 
+--------+-----------+------------+----------------------+-------------------+
| Date   | Type      | Specialty  | Care Team            | Description       |
+--------+-----------+------------+----------------------+-------------------+
| / | Office    | Cardiology |   Tonia Wilson,    |                   |
|    | Visit     |            | [facility]  [address]   |                   |
|        |           |            | St  [place], [place]  |                   |
|        |           |            | [id number] [phone]  |                   |
|        |           |            |  [phone] (Fax)  |                   |
+--------+-----------+------------+----------------------+-------------------+
| / | Hospital  | Radiology  |   Popeye Townsend, |                   |
|    | Encounter |            |  MD  [address] |                   |
|        |           |            |  St.  [place],   |                   |
|        |           |            | [place] [address]             |                   |
|        |           |            | [phone]         |                   |
|        |           |            | [phone] (Fax)   |                   |
+--------+-----------+------------+----------------------+-------------------+
 
| / | Surgery   | Radiology  |   Popeye Townsend, | CV EP PPM SYSTEM  |
|    |           |            |  MD  [address] | IMPLANT           |
|        |           |            |  St.  [place],   |                   |
|        |           |            | [place] [address]             |                   |
|        |           |            | [phone]         |                   |
|        |           |            | [phone] (Fax)   |                   |
+--------+-----------+------------+----------------------+-------------------+
| 02/10/ | Clinical  | Cardiology |                      |                   |
|    | Support   |            |                      |                   |
+--------+-----------+------------+----------------------+-------------------+
| / | Office    | Cardiology |   Hellberg, Tonia,    |                   |
|    | Visit     |            | [facility]  [address]   |                   |
|        |           |            |   [place]  |                   |
|        |           |            | [address]  [phone]  |                   |
|        |           |            |  [phone] (Fax)  |                   |
+--------+-----------+------------+----------------------+-------------------+
| / | Off-Site  | Nephrology |   Marzena Moser  |                   |
2020   | Visit     |            | DO ETIENNE  [address]      |                   |
|        |           |            | [address]      |                   |
|        |           |            | [place]      |                   |
|        |           |            | [address]  [phone]  |                   |
|        |           |            |  [phone] (Fax)  |                   |
+--------+-----------+------------+----------------------+-------------------+
 documented as of this encounter
 
 Procedures
 
 
+-------------------+--------+-------------+----------------------+----------------------+
| Procedure Name    | Priori | Date/Time   | Associated Diagnosis | Comments             |
|                   | ty     |             |                      |                      |
+-------------------+--------+-------------+----------------------+----------------------+
| POCT URINALYSIS,  | Routin | 2012  |   Complications of   |   Results for this   |
| AUTO WITH CONF    | e      |  1:06 PM    | transplanted kidney  | procedure are in the |
|                   |        | PST         |  Unspecified         |  results section.    |
|                   |        |             | hypertensive kidney  |                      |
|                   |        |             | disease with chronic |                      |
|                   |        |             |  kidney disease      |                      |
|                   |        |             | stage I through      |                      |
|                   |        |             | stage IV, or         |                      |
|                   |        |             | unspecified          |                      |
|                   |        |             | DIABETES MELLITUS,   |                      |
|                   |        |             | TYPE II,             |                      |
|                   |        |             | UNCONTROLLED         |                      |
|                   |        |             | Hyperlipidemia       |                      |
+-------------------+--------+-------------+----------------------+----------------------+
 documented in this encounter
 
 Results
 POCT Urinalysis Dipstick Automated (2012  1:06 PM PST)
 
+-------------+-----------+-----------+------------+--------------+
| Component   | Value     | Ref Range | Performed  | Pathologist  |
|             |           |           | At         | Signature    |
+-------------+-----------+-----------+------------+--------------+
| Color, UA,  | Yellow    |           |            |              |
| POC         |           |           |            |              |
+-------------+-----------+-----------+------------+--------------+
| Clarity,    | Clear     |           |            |              |
| UA, POC     |           |           |            |              |
 
+-------------+-----------+-----------+------------+--------------+
| Glucose,    | Negative  |           |            |              |
| UA, POC     |           |           |            |              |
+-------------+-----------+-----------+------------+--------------+
| Bilirubin,  | Negative  |           |            |              |
| UA, POC     |           |           |            |              |
+-------------+-----------+-----------+------------+--------------+
| Ketones,    | Negative  | Negative  |            |              |
| UA, POC     |           |           |            |              |
+-------------+-----------+-----------+------------+--------------+
| Specific    | 1.010     |           |            |              |
| Gravity,    |           |           |            |              |
| UA, POC     |           |           |            |              |
+-------------+-----------+-----------+------------+--------------+
| Blood, UA,  | Negative  |           |            |              |
| POC         |           |           |            |              |
+-------------+-----------+-----------+------------+--------------+
| pH, UA, POC | 5.0       |           |            |              |
+-------------+-----------+-----------+------------+--------------+
| Protein,    | 100 mg/dL |           |            |              |
| UA, POC     |           |           |            |              |
+-------------+-----------+-----------+------------+--------------+
| Urobilinoge | 0.2 mg/dL |           |            |              |
| n, UA, POC  |           |           |            |              |
+-------------+-----------+-----------+------------+--------------+
| Nitrite,    | Negative  |           |            |              |
| UA, POC     |           |           |            |              |
+-------------+-----------+-----------+------------+--------------+
| Leukocyte   | Negative  |           |            |              |
| Esterase,   |           |           |            |              |
| UA, POC     |           |           |            |              |
+-------------+-----------+-----------+------------+--------------+
| Reducing    |           | Negative  |            |              |
| Substances, |           |           |            |              |
|  Urine      |           |           |            |              |
+-------------+-----------+-----------+------------+--------------+
| Ictotest    |           | Negative  |            |              |
+-------------+-----------+-----------+------------+--------------+
| Remark      |           |           |            |              |
+-------------+-----------+-----------+------------+--------------+
 
 
 
+-----------------+
| Specimen        |
+-----------------+
| Urine specimen  |
| (specimen)      |
+-----------------+
 documented in this encounter
 
 Visit Diagnoses
 
 
+-----------------------------------------------------------------------------------------+
| Diagnosis                                                                               |
+-----------------------------------------------------------------------------------------+
|   Complications of transplanted kidney - Primary                                        |
+-----------------------------------------------------------------------------------------+
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
 
| stage IV, or unspecified(403.90)  Unspecified hypertensive kidney disease with chronic  |
| kidney disease stage I through stage IV, or unspecified                                 |
+-----------------------------------------------------------------------------------------+
|   DIABETES MELLITUS, TYPE II, UNCONTROLLED  Type II or unspecified type diabetes        |
| mellitus without mention of complication, uncontrolled                                  |
+-----------------------------------------------------------------------------------------+
|   Hyperlipidemia  Other and unspecified hyperlipidemia                                  |
+-----------------------------------------------------------------------------------------+
 documented in this encounter

## 2020-01-08 NOTE — XMS
Encounter Summary
  Created on: 2020
 
 Viviana Ricci
 External Reference #: [id number]
 : 47
 Sex: Female
 
 Demographics
 
 
+-----------------------+----------------------+
| Address               | [address]      |
|                       | [place]  [address] |
+-----------------------+----------------------+
| Home Phone            | [phone]      |
+-----------------------+----------------------+
| Preferred Language    | Unknown              |
+-----------------------+----------------------+
| Marital Status        | Single               |
+-----------------------+----------------------+
| Anabaptism Affiliation | [id number]                 |
+-----------------------+----------------------+
| Race                  | Unknown              |
+-----------------------+----------------------+
| Ethnic Group          | Unknown              |
+-----------------------+----------------------+
 
 
 Author
 
 
+--------------+--------------------------------------------+
| Author       | [facility]  |
|              | and [place]                                |
+--------------+--------------------------------------------+
| Organization | [facility]  |
|              | and [place]                                |
+--------------+--------------------------------------------+
| Address      | Unknown                                    |
+--------------+--------------------------------------------+
| Phone        | Unavailable                                |
+--------------+--------------------------------------------+
 
 
 
 Support
 
 
+----------------+--------------+---------------------+-----------------+
| Name           | Relationship | Address             | Phone           |
+----------------+--------------+---------------------+-----------------+
| Ada/Ed Radhames | ECON         | [address]              | [phone] |
|                |              | [place]  |                 |
|                |              |  [address]              |                 |
+----------------+--------------+---------------------+-----------------+
 
 
 
 
 Care Team Providers
 
 
+-----------------------+------+-------------+
| Care Team Member Name | Role | Phone       |
+-----------------------+------+-------------+
 PCP  | Unavailable |
+-----------------------+------+-------------+
 
 
 
 Reason for Visit
 
 
+-------------+----------+
| Reason      | Comments |
+-------------+----------+
| Lab Results |          |
+-------------+----------+
 
 
 
 Encounter Details
 
 
+--------+-----------+---------------------+----------------------+-------------+
| Date   | Type      | Department          | Care Team            | Description |
+--------+-----------+---------------------+----------------------+-------------+
| / | Telephone |   [facility]         |   Marzena Moser  | Lab Results |
| 2018   |           | NEPHROLOGY  [address]   | M, DO  [address]      |             |
|        |           | [address]   | [address]      |             |
|        |           | [place], [place]     | [place], [place]      |             |
|        |           | [address]          | [address]  [phone]  |             |
|        |           | [phone]        |  [phone] (Fax)  |             |
+--------+-----------+---------------------+----------------------+-------------+
 
 
 
 Social History
 
 
+--------------+-------+-----------+--------+------+
| Tobacco Use  | Types | Packs/Day | Years  | Date |
|              |       |           | Used   |      |
+--------------+-------+-----------+--------+------+
| Never Smoker |       |           |        |      |
+--------------+-------+-----------+--------+------+
 
 
 
+---------------------+---+---+---+
| Smokeless Tobacco:  |   |   |   |
| Never Used          |   |   |   |
+---------------------+---+---+---+
 
 
 
+---------------------------------------------------------------+
| Comments: some second hand smoke exposure, but fairly minimal |
 
+---------------------------------------------------------------+
 
 
 
+-------------+-------------+---------+----------+
| Alcohol Use | Drinks/Week | oz/Week | Comments |
+-------------+-------------+---------+----------+
| No          |             |         |          |
+-------------+-------------+---------+----------+
 
 
 
+------------------+---------------+
| Sex Assigned at  | Date Recorded |
| Birth            |               |
+------------------+---------------+
| Not on file      |               |
+------------------+---------------+
 
 
 
+----------------+-------------+-------------+
| Job Start Date | Occupation  | Industry    |
+----------------+-------------+-------------+
| Not on file    | Not on file | Not on file |
+----------------+-------------+-------------+
 
 
 
+----------------+--------------+------------+
| Travel History | Travel Start | Travel End |
+----------------+--------------+------------+
 
 
 
+-------------------------------------+
| No recent travel history available. |
+-------------------------------------+
 documented as of this encounter
 
 Plan of Treatment
 
 
+--------+-----------+------------+----------------------+-------------------+
| Date   | Type      | Specialty  | Care Team            | Description       |
+--------+-----------+------------+----------------------+-------------------+
| / | Office    | Cardiology |   Tonia Wilson,    |                   |
| 2020   | Visit     |            | [facility]  [address]   |                   |
|        |           |            | St  [place], [place]  |                   |
|        |           |            | [address]  [phone]  |                   |
|        |           |            |  [phone] (Fax)  |                   |
+--------+-----------+------------+----------------------+-------------------+
| / | Hospital  | Radiology  |   Popeye Townsend, |                   |
|    | Encounter |            |  MD  [address] |                   |
|        |           |            |  [place],   |                   |
|        |           |            | [place] [address]             |                   |
|        |           |            | [phone]         |                   |
|        |           |            | [phone] (Fax)   |                   |
+--------+-----------+------------+----------------------+-------------------+
| / | Surgery   | Radiology  |   Popeye Townsend, | CV EP PPM SYSTEM  |
 
|    |           |            |  MD  [address] | IMPLANT           |
|        |           |            |  [place],   |                   |
|        |           |            | [place] [address]             |                   |
|        |           |            | [phone]         |                   |
|        |           |            | [phone] (Fax)   |                   |
+--------+-----------+------------+----------------------+-------------------+
| 02/10/ | Clinical  | Cardiology |                      |                   |
|    | Support   |            |                      |                   |
+--------+-----------+------------+----------------------+-------------------+
| / | Office    | Cardiology |   Tonia Wilson,    |                   |
|    | Visit     |            | [facility]  [address]   |                   |
|        |           |            |   [place]  |                   |
|        |           |            | [address]  [phone]  |                   |
|        |           |            |  [phone] (Fax)  |                   |
+--------+-----------+------------+----------------------+-------------------+
| / | Off-Site  | Nephrology |   Marzena Moser  |                   |
|    | Visit     |            | DO ETIENNE  [address] [place] [address]      |                   |
|        |           |            | [address]      |                   |
|        |           |            | [place]      |                   |
|        |           |            | [address]  [phone]  |                   |
|        |           |            |  [phone] (Fax)  |                   |
+--------+-----------+------------+----------------------+-------------------+
 documented as of this encounter
 
 Procedures
 
 
+------------------+--------+------------+----------------------+----------------------+
| Procedure Name   | Priori | Date/Time  | Associated Diagnosis | Comments             |
|                  | ty     |            |                      |                      |
+------------------+--------+------------+----------------------+----------------------+
| CULTURE, URINE,  | Routin | 2018 |                      |   Results for this   |
| REFLEXIVE        | e      |            |                      | procedure are in the |
|                  |        |            |                      |  results section.    |
+------------------+--------+------------+----------------------+----------------------+
 documented in this encounter
 
 Results
 Culture, Urine, Reflexive (2018)
 
+----------+
| Specimen |
+----------+
| Urine    |
+----------+
 
 
 
+---------------------------------------------------------------+--------------+
| Narrative                                                     | Performed At |
+---------------------------------------------------------------+--------------+
|   18- 100,000 CFU/mL Non-Lactose .     18-  |              |
| Non-Lactose  identified as pseudomonas aeruginosa.   |              |
+---------------------------------------------------------------+--------------+
 
 
 
+--------------+-----------------+--------+----------------+
| Organism     | Antibiotic      | Method | Susceptibility |
+--------------+-----------------+--------+----------------+
 
| Pseudomonas  | Piperacillin +  |        |   Intermediate |
| aeruginosa   | Tazobactam      |        |                |
+--------------+-----------------+--------+----------------+
| Pseudomonas  | Cefepime        |        |   Sensitive    |
| aeruginosa   |                 |        |                |
+--------------+-----------------+--------+----------------+
| Pseudomonas  | Imipenem        |        |   Sensitive    |
| aeruginosa   |                 |        |                |
+--------------+-----------------+--------+----------------+
| Pseudomonas  | Meropenem       |        |   Sensitive    |
| aeruginosa   |                 |        |                |
+--------------+-----------------+--------+----------------+
| Pseudomonas  | Gentamicin      |        |   Sensitive    |
| aeruginosa   |                 |        |                |
+--------------+-----------------+--------+----------------+
| Pseudomonas  | Ciprofloxacin   |        |   Sensitive    |
| aeruginosa   |                 |        |                |
+--------------+-----------------+--------+----------------+
| Pseudomonas  | Levofloxacin    |        |   Sensitive    |
| aeruginosa   |                 |        |                |
+--------------+-----------------+--------+----------------+
| Pseudomonas  | Cefazolin       |        |   Resistant    |
| aeruginosa   |                 |        |                |
+--------------+-----------------+--------+----------------+
 documented in this encounter
 
 Visit Diagnoses
 Not on filedocumented in this encounter

## 2020-01-08 NOTE — XMS
Encounter Summary
  Created on: 2020
 
 Viviana Ricci
 External Reference #: [id number]
 : 47
 Sex: Female
 
 Demographics
 
 
+-----------------------+----------------------+
| Address               | [address]      |
|                       | [place]  [address] |
+-----------------------+----------------------+
| Home Phone            | [phone]      |
+-----------------------+----------------------+
| Preferred Language    | Unknown              |
+-----------------------+----------------------+
| Marital Status        | Single               |
+-----------------------+----------------------+
| Yazidi Affiliation | [id number]                 |
+-----------------------+----------------------+
| Race                  | Unknown              |
+-----------------------+----------------------+
| Ethnic Group          | Unknown              |
+-----------------------+----------------------+
 
 
 Author
 
 
+--------------+--------------------------------------------+
| Author       | [facility]  |
|              | and [place]                                |
+--------------+--------------------------------------------+
| Organization | [facility]  |
|              | and [place]                                |
+--------------+--------------------------------------------+
| Address      | Unknown                                    |
+--------------+--------------------------------------------+
| Phone        | Unavailable                                |
+--------------+--------------------------------------------+
 
 
 
 Support
 
 
+----------------+--------------+---------------------+-----------------+
| Name           | Relationship | Address             | Phone           |
+----------------+--------------+---------------------+-----------------+
| Ada/Ed Radhames | ECON         | [address]              | [phone] |
|                |              | [place]  |                 |
|                |              |  [address]              |                 |
+----------------+--------------+---------------------+-----------------+
 
 
 
 
 Care Team Providers
 
 
+-----------------------+------+-------------+
| Care Team Member Name | Role | Phone       |
+-----------------------+------+-------------+
 PCP  | Unavailable |
+-----------------------+------+-------------+
 
 
 
 Reason for Visit
 
 
+-------------------+----------+
| Reason            | Comments |
+-------------------+----------+
| Medication Refill |          |
+-------------------+----------+
 
 
 
 Encounter Details
 
 
+--------+--------+---------------------+----------------------+-------------------+
| Date   | Type   | Department          | Care Team            | Description       |
+--------+--------+---------------------+----------------------+-------------------+
| / | Refill |   [facility]         |   Marzena Moser  | Medication Refill |
|    |        | NEPHROLOGY  [address]   | M, DO  [address]      |                   |
|        |        | [address]   | [address]      |                   |
|        |        | [place], [place]     | [place], [place]      |                   |
|        |        | [address]          | [address]  [phone]  |                   |
|        |        | [phone]        |  [phone] (Fax)  |                   |
+--------+--------+---------------------+----------------------+-------------------+
 
 
 
 Social History
 
 
+--------------+-------+-----------+--------+------+
| Tobacco Use  | Types | Packs/Day | Years  | Date |
|              |       |           | Used   |      |
+--------------+-------+-----------+--------+------+
| Never Smoker |       |           |        |      |
+--------------+-------+-----------+--------+------+
 
 
 
+---------------------+---+---+---+
| Smokeless Tobacco:  |   |   |   |
| Never Used          |   |   |   |
+---------------------+---+---+---+
 
 
 
+---------------------------------------------------------------+
| Comments: some second hand smoke exposure, but fairly minimal |
 
+---------------------------------------------------------------+
 
 
 
+-------------+-------------+---------+----------+
| Alcohol Use | Drinks/Week | oz/Week | Comments |
+-------------+-------------+---------+----------+
| No          |             |         |          |
+-------------+-------------+---------+----------+
 
 
 
+------------------+---------------+
| Sex Assigned at  | Date Recorded |
| Birth            |               |
+------------------+---------------+
| Not on file      |               |
+------------------+---------------+
 
 
 
+----------------+-------------+-------------+
| Job Start Date | Occupation  | Industry    |
+----------------+-------------+-------------+
| Not on file    | Not on file | Not on file |
+----------------+-------------+-------------+
 
 
 
+----------------+--------------+------------+
| Travel History | Travel Start | Travel End |
+----------------+--------------+------------+
 
 
 
+-------------------------------------+
| No recent travel history available. |
+-------------------------------------+
 documented as of this encounter
 
 Plan of Treatment
 
 
+--------+-----------+------------+----------------------+-------------------+
| Date   | Type      | Specialty  | Care Team            | Description       |
+--------+-----------+------------+----------------------+-------------------+
| / | Office    | Cardiology |   HellalfredoTonia,    |                   |
|    | Visit     |            | ARNP  [address]   |                   |
|        |           |            | St  [place], [place]  |                   |
|        |           |            | [address]  [phone]  |                   |
|        |           |            |  [phone] (Fax)  |                   |
+--------+-----------+------------+----------------------+-------------------+
| / | Hospital  | Radiology  |   Popeye Townsend, |                   |
|    | Encounter |            |  MD  [address] |                   |
|        |           |            |  St.  [place],   |                   |
|        |           |            | [place] [address]             |                   |
|        |           |            | [phone]         |                   |
|        |           |            | [phone] (Fax)   |                   |
+--------+-----------+------------+----------------------+-------------------+
| / | Surgery   | Radiology  |   Popeye Townsend, | CV EP PPM SYSTEM  |
 
|    |           |            |  MD  [address] | IMPLANT           |
|        |           |            |  St.  [place],   |                   |
|        |           |            | [place] [address]             |                   |
|        |           |            | [phone]         |                   |
|        |           |            | [phone] (Fax)   |                   |
+--------+-----------+------------+----------------------+-------------------+
| 02/10/ | Clinical  | Cardiology |                      |                   |
|    | Support   |            |                      |                   |
+--------+-----------+------------+----------------------+-------------------+
| / | Office    | Cardiology |   Tonia Wilson,    |                   |
|    | Visit     |            | ARNP  [address]   |                   |
|        |           |            | [place]  |                   |
|        |           |            | [address]  [phone]  |                   |
|        |           |            |  [phone] (Fax)  |                   |
+--------+-----------+------------+----------------------+-------------------+
| / | Off-Site  | Nephrology |   Marzena Moser  |                   |
|    | Visit     |            | DO ETIENNE  [address] [place] [address]      |                   |
|        |           |            | [address]      |                   |
|        |           |            | [place]      |                   |
|        |           |            | [address]  [phone]  |                   |
|        |           |            |  [phone] (Fax)  |                   |
+--------+-----------+------------+----------------------+-------------------+
 documented as of this encounter
 
 Visit Diagnoses
 Not on filedocumented in this encounter

## 2020-01-08 NOTE — XMS
Encounter Summary
  Created on: 2020
 
 Viviana Ricci
 External Reference #: [id number]
 : 47
 Sex: Female
 
 Demographics
 
 
+-----------------------+----------------------+
| Address               | [address]      |
|                       | [place]  [address] |
+-----------------------+----------------------+
| Home Phone            | [phone]      |
+-----------------------+----------------------+
| Preferred Language    | Unknown              |
+-----------------------+----------------------+
| Marital Status        | Single               |
+-----------------------+----------------------+
| [employer] Affiliation | [id number]                 |
+-----------------------+----------------------+
| Race                  | Unknown              |
+-----------------------+----------------------+
| Ethnic Group          | Unknown              |
+-----------------------+----------------------+
 
 
 Author
 
 
+--------------+--------------------------------------------+
| Author       | [facility]  |
|              | and [facility]                                |
+--------------+--------------------------------------------+
| Organization | [facility]  |
|              | and [facility]                                |
+--------------+--------------------------------------------+
| Address      | Unknown                                    |
+--------------+--------------------------------------------+
| Phone        | Unavailable                                |
+--------------+--------------------------------------------+
 
 
 
 Support
 
 
+----------------+--------------+---------------------+-----------------+
| Name           | Relationship | Address             | Phone           |
+----------------+--------------+---------------------+-----------------+
| Ada/Ed Radhames | ECON         | [address]              | [phone] |
|                |              | [place]  |                 |
|                |              |  [id number]              |                 |
+----------------+--------------+---------------------+-----------------+
 
 
 
 
 Care Team Providers
 
 
+-----------------------+------+-------------+
| Care Team Member Name | Role | Phone       |
+-----------------------+------+-------------+
 PCP  | Unavailable |
+-----------------------+------+-------------+
 
 
 
 Reason for Visit
 
 
+----------------+----------+
| Reason         | Comments |
+----------------+----------+
| Urinary Tract  |          |
| Infection      |          |
+----------------+----------+
 
 
 
 Encounter Details
 
 
+--------+-----------+---------------------+----------------------+----------------+
| Date   | Type      | Department          | Care Team            | Description    |
+--------+-----------+---------------------+----------------------+----------------+
| / | Telephone |   [facility]         |   Marzena Moser  | Urinary Tract  |
|    |           | NEPHROLOGY  [address]   | M, DO  [address]      | Infection      |
|        |           | [address]   | [address]      |                |
|        |           | [place], [place]     | [place], [place]      |                |
|        |           | [address]          | [address]  [phone]  |                |
|        |           | [phone]        |  [phone] (Fax)  |                |
+--------+-----------+---------------------+----------------------+----------------+
 
 
 
 Social History
 
 
+--------------+-------+-----------+--------+------+
| Tobacco Use  | Types | Packs/Day | Years  | Date |
|              |       |           | Used   |      |
+--------------+-------+-----------+--------+------+
| Never Smoker |       |           |        |      |
+--------------+-------+-----------+--------+------+
 
 
 
+---------------------+---+---+---+
| Smokeless Tobacco:  |   |   |   |
| Never Used          |   |   |   |
+---------------------+---+---+---+
 
 
 
+---------------------------------------------------------------+
 
| Comments: some second hand smoke exposure, but fairly minimal |
+---------------------------------------------------------------+
 
 
 
+-------------+-------------+---------+----------+
| Alcohol Use | Drinks/Week | oz/Week | Comments |
+-------------+-------------+---------+----------+
| No          |             |         |          |
+-------------+-------------+---------+----------+
 
 
 
+------------------+---------------+
| Sex Assigned at  | Date Recorded |
| Birth            |               |
+------------------+---------------+
| Not on file      |               |
+------------------+---------------+
 
 
 
+----------------+-------------+-------------+
| Job Start Date | Occupation  | Industry    |
+----------------+-------------+-------------+
| Not on file    | Not on file | Not on file |
+----------------+-------------+-------------+
 
 
 
+----------------+--------------+------------+
| Travel History | Travel Start | Travel End |
+----------------+--------------+------------+
 
 
 
+-------------------------------------+
| No recent travel history available. |
+-------------------------------------+
 documented as of this encounter
 
 Plan of Treatment
 
 
+--------+-----------+------------+----------------------+-------------------+
| Date   | Type      | Specialty  | Care Team            | Description       |
+--------+-----------+------------+----------------------+-------------------+
| / | Office    | Cardiology |   Tonia Wilson,    |                   |
|    | Visit     |            | ARNJESSICA  [address]   |                   |
|        |           |            | St  [facility] [place]  |                   |
|        |           |            | [address]  [phone]  |                   |
|        |           |            |  [phone] (Fax)  |                   |
+--------+-----------+------------+----------------------+-------------------+
| / | Hospital  | Radiology  |   Popeye Townsend, |                   |
|    | Encounter |            |  MD  [address] |                   |
|        |           |            |  [place],   |                   |
|        |           |            | [place] [address]             |                   |
|        |           |            | [phone]         |                   |
|        |           |            | [phone] (Fax)   |                   |
+--------+-----------+------------+----------------------+-------------------+
 
| / | Surgery   | Radiology  |   Popeye Townsend, | CV EP PPM SYSTEM  |
|    |           |            |  MD  [address] | IMPLANT           |
|        |           |            |  St.  [place],   |                   |
|        |           |            | [place] [address]             |                   |
|        |           |            | [phone]         |                   |
|        |           |            | [phone] (Fax)   |                   |
+--------+-----------+------------+----------------------+-------------------+
| 02/10/ | Clinical  | Cardiology |                      |                   |
|    | Support   |            |                      |                   |
+--------+-----------+------------+----------------------+-------------------+
| / | Office    | Cardiology |   Tonia Wilson,    |                   |
|    | Visit     |            | [facility]  [address]   |                   |
|        |           |            | [place]  |                   |
|        |           |            | [address]  [phone]  |                   |
|        |           |            |  [phone] (Fax)  |                   |
+--------+-----------+------------+----------------------+-------------------+
| / | Off-Site  | Nephrology |   Marzena Moser  |                   |
|    | Visit     |            | DO ETIENNE  [address] [place] [address]      |                   |
|        |           |            | [address]      |                   |
|        |           |            | [place]      |                   |
|        |           |            | [id number] [phone]  |                   |
|        |           |            |  [phone] (Fax)  |                   |
+--------+-----------+------------+----------------------+-------------------+
 documented as of this encounter
 
 Procedures
 
 
+----------------+--------+------------+----------------------+----------------------+
| Procedure Name | Priori | Date/Time  | Associated Diagnosis | Comments             |
|                | ty     |            |                      |                      |
+----------------+--------+------------+----------------------+----------------------+
| CULTURE, URINE | Routin | 2015 |                      |   Results for this   |
|                | e      |            |                      | procedure are in the |
|                |        |            |                      |  results section.    |
+----------------+--------+------------+----------------------+----------------------+
 documented in this encounter
 
 Results
 Culture, Urine (2015)
 
+-----------+----------------------+-----------+------------+--------------+
| Component | Value                | Ref Range | Performed  | Pathologist  |
|           |                      |           | At         | Signature    |
+-----------+----------------------+-----------+------------+--------------+
| Urine     | >[id number] citrobacter  |           |            |              |
| Culture,  | freundii             |           |            |              |
| Routine   |                      |           |            |              |
+-----------+----------------------+-----------+------------+--------------+
 
 
 
+-----------------+
| Specimen        |
+-----------------+
| Urine specimen  |
| (specimen)      |
+-----------------+
 
 
 
 
+----------------------+------------------+--------+----------------+
| Organism             | Antibiotic       | Method | Susceptibility |
+----------------------+------------------+--------+----------------+
| Citrobacter freundii | Ciprofloxacin    |        |   Sensitive    |
+----------------------+------------------+--------+----------------+
| Citrobacter freundii | Ceftriaxone      |        |   Sensitive    |
+----------------------+------------------+--------+----------------+
| Citrobacter freundii | Nitrofurantoin   |        |   Sensitive    |
+----------------------+------------------+--------+----------------+
| Citrobacter freundii | Gentamicin       |        |   Sensitive    |
+----------------------+------------------+--------+----------------+
| Citrobacter freundii | Tetracycline     |        |   Sensitive    |
+----------------------+------------------+--------+----------------+
| Citrobacter freundii | Trimethoprim +   |        |   Sensitive    |
|                      | Sulfamethoxazole |        |                |
+----------------------+------------------+--------+----------------+
| Citrobacter freundii | Aztreonam        |        |   Sensitive    |
+----------------------+------------------+--------+----------------+
| Citrobacter freundii | Amoxicillin +    |        |   Resistant    |
|                      | Clavulanate      |        |                |
+----------------------+------------------+--------+----------------+
| Citrobacter freundii | Cefazolin        |        |   Resistant    |
+----------------------+------------------+--------+----------------+
| Citrobacter freundii | Ertapenem        |        |   Sensitive    |
+----------------------+------------------+--------+----------------+
 documented in this encounter
 
 Visit Diagnoses
 Not on filedocumented in this encounter

## 2020-01-08 NOTE — XMS
Encounter Summary
  Created on: 2020
 
 Viviana Ricci
 External Reference #: [id number]
 : 47
 Sex: Female
 
 Demographics
 
 
+-----------------------+----------------------+
| Address               | [address]      |
|                       | [place]  [address] |
+-----------------------+----------------------+
| Home Phone            | [phone]      |
+-----------------------+----------------------+
| Preferred Language    | Unknown              |
+-----------------------+----------------------+
| Marital Status        | Single               |
+-----------------------+----------------------+
| [employer] Affiliation | [id number]                 |
+-----------------------+----------------------+
| Race                  | Unknown              |
+-----------------------+----------------------+
| Ethnic Group          | Unknown              |
+-----------------------+----------------------+
 
 
 Author
 
 
+--------------+--------------------------------------------+
| Author       | [facility]  |
|              | and [facility]                                |
+--------------+--------------------------------------------+
| Organization | [facility]  |
|              | and [facility]                                |
+--------------+--------------------------------------------+
| Address      | Unknown                                    |
+--------------+--------------------------------------------+
| Phone        | Unavailable                                |
+--------------+--------------------------------------------+
 
 
 
 Support
 
 
+----------------+--------------+---------------------+-----------------+
| Name           | Relationship | Address             | Phone           |
+----------------+--------------+---------------------+-----------------+
| Ada/Ed Radhames | ECON         | [address]              | [phone] |
|                |              | [place]  |                 |
|                |              |  [address]              |                 |
+----------------+--------------+---------------------+-----------------+
 
 
 
 
 Care Team Providers
 
 
+-----------------------+------+-------------+
| Care Team Member Name | Role | Phone       |
+-----------------------+------+-------------+
 PCP  | Unavailable |
+-----------------------+------+-------------+
 
 
 
 Encounter Details
 
 
+--------+----------+---------------------+----------------------+-------------+
| Date   | Type     | Department          | Care Team            | Description |
+--------+----------+---------------------+----------------------+-------------+
| / | Abstract |   [facility] [place]         |   Marzena Moser  |             |
|    |          | NEPHROLOGY  [address]   | M, DO  [address]      |             |
|        |          | [address]   | [address]      |             |
|        |          | [place], [place]     | [place]      |             |
|        |          | [address]          | [address]  [phone]  |             |
|        |          | [phone]        |  [phone] (Fax)  |             |
+--------+----------+---------------------+----------------------+-------------+
 
 
 
 Social History
 
 
+--------------+-------+-----------+--------+------+
| Tobacco Use  | Types | Packs/Day | Years  | Date |
|              |       |           | Used   |      |
+--------------+-------+-----------+--------+------+
| Never Smoker |       |           |        |      |
+--------------+-------+-----------+--------+------+
 
 
 
+---------------------+---+---+---+
| Smokeless Tobacco:  |   |   |   |
| Never Used          |   |   |   |
+---------------------+---+---+---+
 
 
 
+---------------------------------------------------------------+
| Comments: some second hand smoke exposure, but fairly minimal |
+---------------------------------------------------------------+
 
 
 
+-------------+-------------+---------+----------+
| Alcohol Use | Drinks/Week | oz/Week | Comments |
+-------------+-------------+---------+----------+
| No          |             |         |          |
+-------------+-------------+---------+----------+
 
 
 
 
+------------------+---------------+
| Sex Assigned at  | Date Recorded |
| Birth            |               |
+------------------+---------------+
| Not on file      |               |
+------------------+---------------+
 
 
 
+----------------+-------------+-------------+
| Job Start Date | Occupation  | Industry    |
+----------------+-------------+-------------+
| Not on file    | Not on file | Not on file |
+----------------+-------------+-------------+
 
 
 
+----------------+--------------+------------+
| Travel History | Travel Start | Travel End |
+----------------+--------------+------------+
 
 
 
+-------------------------------------+
| No recent travel history available. |
+-------------------------------------+
 documented as of this encounter
 
 Plan of Treatment
 
 
+--------+-----------+------------+----------------------+-------------------+
| Date   | Type      | Specialty  | Care Team            | Description       |
+--------+-----------+------------+----------------------+-------------------+
| / | Office    | Cardiology |   Tonia Wilson,    |                   |
|    | Visit     |            | ARNJESSICA  [address]   |                   |
|        |           |            | [place]  |                   |
|        |           |            | [address]  [phone]  |                   |
|        |           |            |  [phone] (Fax)  |                   |
+--------+-----------+------------+----------------------+-------------------+
| / | Hospital  | Radiology  |   Popeye Townsend, |                   |
|    | Encounter |            |  MD  [address] |                   |
|        |           |            |  St.  [place],   |                   |
|        |           |            | [place] [address]             |                   |
|        |           |            | [phone]         |                   |
|        |           |            | [phone] (Fax)   |                   |
+--------+-----------+------------+----------------------+-------------------+
| / | Surgery   | Radiology  |   Popeye Townsend, | CV EP PPM SYSTEM  |
|    |           |            |  MD  [address] | IMPLANT           |
|        |           |            |  St.  [place],   |                   |
|        |           |            | [place] [address]             |                   |
|        |           |            | [phone]         |                   |
|        |           |            | [phone] (Fax)   |                   |
+--------+-----------+------------+----------------------+-------------------+
| 02/10/ | Clinical  | Cardiology |                      |                   |
|    | Support   |            |                      |                   |
+--------+-----------+------------+----------------------+-------------------+
| / | Office    | Cardiology |   Tonia Wilson,    |                   |
|    | Visit     |            | ARNP  [address]   |                   |
 
|        |           |            | St  [place], [place]  |                   |
|        |           |            | [address]  [phone]  |                   |
|        |           |            |  [phone] (Fax)  |                   |
+--------+-----------+------------+----------------------+-------------------+
| / | Off-Site  | Nephrology |   Marzena Moser  |                   |
|    | Visit     |            | DO ETIENNE  [address] [place] [address]      |                   |
|        |           |            | [address]      |                   |
|        |           |            | [place]      |                   |
|        |           |            | [address]  [phone]  |                   |
|        |           |            |  [phone] (Fax)  |                   |
+--------+-----------+------------+----------------------+-------------------+
 documented as of this encounter
 
 Procedures
 
 
+----------------------+--------+------------+----------------------+----------------------+
| Procedure Name       | Priori | Date/Time  | Associated Diagnosis | Comments             |
|                      | ty     |            |                      |                      |
+----------------------+--------+------------+----------------------+----------------------+
| EXTERNAL LAB: BILLY    | Routin | 2014 |                      |   Results for this   |
|                      | e      |            |                      | procedure are in the |
|                      |        |            |                      |  results section.    |
+----------------------+--------+------------+----------------------+----------------------+
| EXTERNAL LAB: AST    | Routin | 2014 |                      |   Results for this   |
|                      | e      |            |                      | procedure are in the |
|                      |        |            |                      |  results section.    |
+----------------------+--------+------------+----------------------+----------------------+
| EXTERNAL LAB: ALT    | Routin | 2014 |                      |   Results for this   |
|                      | e      |            |                      | procedure are in the |
|                      |        |            |                      |  results section.    |
+----------------------+--------+------------+----------------------+----------------------+
| EXTERNAL LAB:        | Routin | 2014 |                      |   Results for this   |
| TRIGLYCERIDES        | e      |            |                      | procedure are in the |
|                      |        |            |                      |  results section.    |
+----------------------+--------+------------+----------------------+----------------------+
| EXTERNAL LAB:        | Routin | 2014 |                      |   Results for this   |
| CHOLESTEROL, HDL     | e      |            |                      | procedure are in the |
|                      |        |            |                      |  results section.    |
+----------------------+--------+------------+----------------------+----------------------+
| EXTERNAL LAB:        | Routin | 2014 |                      |   Results for this   |
| CHOLESTEROL, TOTAL   | e      |            |                      | procedure are in the |
|                      |        |            |                      |  results section.    |
+----------------------+--------+------------+----------------------+----------------------+
| EXTERNAL LAB:        | Routin | 2014 |                      |   Results for this   |
| CHOLESTEROL, LDL     | e      |            |                      | procedure are in the |
|                      |        |            |                      |  results section.    |
+----------------------+--------+------------+----------------------+----------------------+
| EXTERNAL LAB: EGFR   | Routin | 2014 |                      |   Results for this   |
|                      | e      |            |                      | procedure are in the |
|                      |        |            |                      |  results section.    |
+----------------------+--------+------------+----------------------+----------------------+
| EXTERNAL LAB:        | Routin | 2014 |                      |   Results for this   |
| CREATININE           | e      |            |                      | procedure are in the |
|                      |        |            |                      |  results section.    |
+----------------------+--------+------------+----------------------+----------------------+
| EXTERNAL LAB:        | Routin | 2014 |                      |   Results for this   |
| HEMOGLOBIN A1C       | e      |            |                      | procedure are in the |
|                      |        |            |                      |  results section.    |
+----------------------+--------+------------+----------------------+----------------------+
 
| CMP14+LP+CBC/D/PLT+T | Routin | 2014 |                      |   Results for this   |
| SH+UA/M (NON ORD)    | e      |            |                      | procedure are in the |
|                      |        |            |                      |  results section.    |
+----------------------+--------+------------+----------------------+----------------------+
 documented in this encounter
 
 Results
 External Lab: Hemoglobin A1c (2014)
 
+-------------+-------+-----------+------------+--------------+
| Component   | Value | Ref Range | Performed  | Pathologist  |
|             |       |           | At         | Signature    |
+-------------+-------+-----------+------------+--------------+
| Hemoglobin  | 7.4   |           | EXTERNAL   |              |
| A1c,        |       |           | LAB        |              |
| external    |       |           |            |              |
+-------------+-------+-----------+------------+--------------+
 
 
 
+-----------------+
| Specimen        |
+-----------------+
| Blood specimen  |
| (specimen)      |
+-----------------+
 
 
 
+--------------------------+
| Resulting Agency Comment |
+--------------------------+
|   [facility]              |
+--------------------------+
 
 
 
+----------------+---------+--------------------+--------------+
| Performing     | Address | City/State/Zipcode | Phone Number |
| Organization   |         |                    |              |
+----------------+---------+--------------------+--------------+
|   EXTERNAL LAB |         |                    |              |
+----------------+---------+--------------------+--------------+
 CMP14+LP+CBC/D/Plt+TSH+UA/M (2014)
 
+-------------+-------+-----------------+------------+--------------+
| Component   | Value | Ref Range       | Performed  | Pathologist  |
|             |       |                 | At         | Signature    |
+-------------+-------+-----------------+------------+--------------+
| Na          | 138   | mmol/L          |            |              |
+-------------+-------+-----------------+------------+--------------+
| K           | 5.0   | mmol/L          |            |              |
+-------------+-------+-----------------+------------+--------------+
| Cl          | 108   | mmol/L          |            |              |
+-------------+-------+-----------------+------------+--------------+
| CO2         | 21    | mmol/L          |            |              |
+-------------+-------+-----------------+------------+--------------+
| BUN         | 44    | mg/dL           |            |              |
+-------------+-------+-----------------+------------+--------------+
| Glucose     | 100   | mg/dL           |            |              |
 
+-------------+-------+-----------------+------------+--------------+
| Calcium     | 10.2  | mg/dL           |            |              |
+-------------+-------+-----------------+------------+--------------+
| PTH INTACT  | 196   | pg/mL           |            |              |
+-------------+-------+-----------------+------------+--------------+
| Phosphorus  | 3.1   | 2.6 - 4.4 mg/dL |            |              |
+-------------+-------+-----------------+------------+--------------+
| Magnesium   | 1.9   | mg/dL           |            |              |
+-------------+-------+-----------------+------------+--------------+
| MCV         | 103.9 | fL              |            |              |
+-------------+-------+-----------------+------------+--------------+
| Bilirubin   | 0.5   | mg/dL           |            |              |
| Total       |       |                 |            |              |
+-------------+-------+-----------------+------------+--------------+
| Alkaline    | 102   | U/L             |            |              |
| Phosphatase |       |                 |            |              |
+-------------+-------+-----------------+------------+--------------+
| Albumin     | 3.9   | 3.3 - 4.8 g/dL  |            |              |
+-------------+-------+-----------------+------------+--------------+
| Tacrolimus  | 6.0   |                 |            |              |
| Level       |       |                 |            |              |
+-------------+-------+-----------------+------------+--------------+
 
 
 
+----------+
| Specimen |
+----------+
|          |
+----------+
 External Lab: CBC (2014)
 
+-----------+-------+-----------+------------+--------------+
| Component | Value | Ref Range | Performed  | Pathologist  |
|           |       |           | At         | Signature    |
+-----------+-------+-----------+------------+--------------+
| WBC,      | 6.8   |           | EXTERNAL   |              |
| External  |       |           | LAB        |              |
+-----------+-------+-----------+------------+--------------+
| HGB,      | 13.3  |           | EXTERNAL   |              |
| External  |       |           | LAB        |              |
+-----------+-------+-----------+------------+--------------+
| HCT,      | 39.6  |           | EXTERNAL   |              |
| External  |       |           | LAB        |              |
+-----------+-------+-----------+------------+--------------+
| PLT,      | 172   |           | EXTERNAL   |              |
| External  |       |           | LAB        |              |
+-----------+-------+-----------+------------+--------------+
 
 
 
+--------------------------+
| Resulting Agency Comment |
+--------------------------+
|   [facility]              |
+--------------------------+
 
 
 
+----------------+---------+--------------------+--------------+
 
| Performing     | Address | City/State/Zipcode | Phone Number |
| Organization   |         |                    |              |
+----------------+---------+--------------------+--------------+
|   EXTERNAL LAB |         |                    |              |
+----------------+---------+--------------------+--------------+
 External Lab: AST (2014)
 
+-----------+-------+-----------+------------+--------------+
| Component | Value | Ref Range | Performed  | Pathologist  |
|           |       |           | At         | Signature    |
+-----------+-------+-----------+------------+--------------+
| AST,      | 23    |           | EXTERNAL   |              |
| External  |       |           | LAB        |              |
+-----------+-------+-----------+------------+--------------+
 
 
 
+-----------------+
| Specimen        |
+-----------------+
| Blood specimen  |
| (specimen)      |
+-----------------+
 
 
 
+--------------------------+
| Resulting Agency Comment |
+--------------------------+
|   [facility]              |
+--------------------------+
 
 
 
+----------------+---------+--------------------+--------------+
| Performing     | Address | City/State/Zipcode | Phone Number |
| Organization   |         |                    |              |
+----------------+---------+--------------------+--------------+
|   EXTERNAL LAB |         |                    |              |
+----------------+---------+--------------------+--------------+
 External Lab: ALT (2014)
 
+-----------+-------+-----------+------------+--------------+
| Component | Value | Ref Range | Performed  | Pathologist  |
|           |       |           | At         | Signature    |
+-----------+-------+-----------+------------+--------------+
| ALT,      | 16    |           | EXTERNAL   |              |
| External  |       |           | LAB        |              |
+-----------+-------+-----------+------------+--------------+
 
 
 
+-----------------+
| Specimen        |
+-----------------+
| Blood specimen  |
| (specimen)      |
+-----------------+
 
 
 
 
+--------------------------+
| Resulting Agency Comment |
+--------------------------+
|   [facility]              |
+--------------------------+
 
 
 
+----------------+---------+--------------------+--------------+
| Performing     | Address | City/State/Zipcode | Phone Number |
| Organization   |         |                    |              |
+----------------+---------+--------------------+--------------+
|   EXTERNAL LAB |         |                    |              |
+----------------+---------+--------------------+--------------+
 External Lab: Triglycerides (2014)
 
+-------------+-------+-----------+------------+--------------+
| Component   | Value | Ref Range | Performed  | Pathologist  |
|             |       |           | At         | Signature    |
+-------------+-------+-----------+------------+--------------+
| Triglycerid | 197   |           | EXTERNAL   |              |
| es,         |       |           | LAB        |              |
| External    |       |           |            |              |
+-------------+-------+-----------+------------+--------------+
 
 
 
+-----------------+
| Specimen        |
+-----------------+
| Blood specimen  |
| (specimen)      |
+-----------------+
 
 
 
+--------------------------+
| Resulting Agency Comment |
+--------------------------+
|   [facility]              |
+--------------------------+
 
 
 
+----------------+---------+--------------------+--------------+
| Performing     | Address | City/State/Zipcode | Phone Number |
| Organization   |         |                    |              |
+----------------+---------+--------------------+--------------+
|   EXTERNAL LAB |         |                    |              |
+----------------+---------+--------------------+--------------+
 External Lab: Cholesterol, HDL (2014)
 
+-------------+-------+-----------+------------+--------------+
| Component   | Value | Ref Range | Performed  | Pathologist  |
|             |       |           | At         | Signature    |
+-------------+-------+-----------+------------+--------------+
| HDL         | 50.2  |           | EXTERNAL   |              |
| Cholesterol |       |           | LAB        |              |
| , External  |       |           |            |              |
 
+-------------+-------+-----------+------------+--------------+
 
 
 
+-----------------+
| Specimen        |
+-----------------+
| Blood specimen  |
| (specimen)      |
+-----------------+
 
 
 
+--------------------------+
| Resulting Agency Comment |
+--------------------------+
|   [facility]              |
+--------------------------+
 
 
 
+----------------+---------+--------------------+--------------+
| Performing     | Address | City/State/Zipcode | Phone Number |
| Organization   |         |                    |              |
+----------------+---------+--------------------+--------------+
|   EXTERNAL LAB |         |                    |              |
+----------------+---------+--------------------+--------------+
 External Lab: Cholesterol, Total (2014)
 
+-------------+-------+-----------+------------+--------------+
| Component   | Value | Ref Range | Performed  | Pathologist  |
|             |       |           | At         | Signature    |
+-------------+-------+-----------+------------+--------------+
| Cholesterol | 155   |           | EXTERNAL   |              |
| , Total,    |       |           | LAB        |              |
| External    |       |           |            |              |
+-------------+-------+-----------+------------+--------------+
 
 
 
+-----------------+
| Specimen        |
+-----------------+
| Blood specimen  |
| (specimen)      |
+-----------------+
 
 
 
+--------------------------+
| Resulting Agency Comment |
+--------------------------+
|   [facility]              |
+--------------------------+
 
 
 
+----------------+---------+--------------------+--------------+
| Performing     | Address | City/State/Zipcode | Phone Number |
| Organization   |         |                    |              |
 
+----------------+---------+--------------------+--------------+
|   EXTERNAL LAB |         |                    |              |
+----------------+---------+--------------------+--------------+
 External Lab: Cholesterol, LDL (2014)
 
+-------------+-------+-----------+------------+--------------+
| Component   | Value | Ref Range | Performed  | Pathologist  |
|             |       |           | At         | Signature    |
+-------------+-------+-----------+------------+--------------+
| LDL         | 65    |           | EXTERNAL   |              |
| Cholesterol |       |           | LAB        |              |
| , Direct,   |       |           |            |              |
| External    |       |           |            |              |
+-------------+-------+-----------+------------+--------------+
 
 
 
+-----------------+
| Specimen        |
+-----------------+
| Blood specimen  |
| (specimen)      |
+-----------------+
 
 
 
+--------------------------+
| Resulting Agency Comment |
+--------------------------+
|   [facility]              |
+--------------------------+
 
 
 
+----------------+---------+--------------------+--------------+
| Performing     | Address | City/State/Zipcode | Phone Number |
| Organization   |         |                    |              |
+----------------+---------+--------------------+--------------+
|   EXTERNAL LAB |         |                    |              |
+----------------+---------+--------------------+--------------+
 External Lab: eGFR (2014)
 
+------------+-------+-----------+------------+--------------+
| Component  | Value | Ref Range | Performed  | Pathologist  |
|            |       |           | At         | Signature    |
+------------+-------+-----------+------------+--------------+
| eGFR,      | 37    |           | EXTERNAL   |              |
| External   |       |           | LAB        |              |
+------------+-------+-----------+------------+--------------+
| eGFR,      |       |           | EXTERNAL   |              |
|     |       |           | LAB        |              |
| [facility],  |       |           |            |              |
| External   |       |           |            |              |
+------------+-------+-----------+------------+--------------+
 
 
 
+-----------------+
| Specimen        |
+-----------------+
 
| Blood specimen  |
| (specimen)      |
+-----------------+
 
 
 
+--------------------------+
| Resulting Agency Comment |
+--------------------------+
|   [facility]              |
+--------------------------+
 
 
 
+----------------+---------+--------------------+--------------+
| Performing     | Address | City/State/Zipcode | Phone Number |
| Organization   |         |                    |              |
+----------------+---------+--------------------+--------------+
|   EXTERNAL LAB |         |                    |              |
+----------------+---------+--------------------+--------------+
 External Lab: Creatinine (2014)
 
+-------------+-------+-----------+------------+--------------+
| Component   | Value | Ref Range | Performed  | Pathologist  |
|             |       |           | At         | Signature    |
+-------------+-------+-----------+------------+--------------+
| Creatinine, | 1.43  |           | EXTERNAL   |              |
|  External   |       |           | LAB        |              |
+-------------+-------+-----------+------------+--------------+
 
 
 
+-----------------+
| Specimen        |
+-----------------+
| Blood specimen  |
| (specimen)      |
+-----------------+
 
 
 
+--------------------------+
| Resulting Agency Comment |
+--------------------------+
|   [facility]              |
+--------------------------+
 
 
 
+----------------+---------+--------------------+--------------+
| Performing     | Address | City/State/Zipcode | Phone Number |
| Organization   |         |                    |              |
+----------------+---------+--------------------+--------------+
|   EXTERNAL LAB |         |                    |              |
+----------------+---------+--------------------+--------------+
 documented in this encounter
 
 Visit Diagnoses
 Not on filedocumented in this encounter

## 2020-01-08 NOTE — XMS
Encounter Summary
  Created on: 2020
 
 Viviana Ricci
 External Reference #: [id number]
 : 47
 Sex: Female
 
 Demographics
 
 
+-----------------------+----------------------+
| Address               | [address]      |
|                       | [place]  [address] |
+-----------------------+----------------------+
| Home Phone            | [phone]      |
+-----------------------+----------------------+
| Preferred Language    | Unknown              |
+-----------------------+----------------------+
| Marital Status        | Single               |
+-----------------------+----------------------+
| Latter day Affiliation | [id number]                 |
+-----------------------+----------------------+
| Race                  | Unknown              |
+-----------------------+----------------------+
| Ethnic Group          | Unknown              |
+-----------------------+----------------------+
 
 
 Author
 
 
+--------------+--------------------------------------------+
| Author       | [facility]  |
|              | and [place]                                |
+--------------+--------------------------------------------+
| Organization | [facility]  |
|              | and [place]                                |
+--------------+--------------------------------------------+
| Address      | Unknown                                    |
+--------------+--------------------------------------------+
| Phone        | Unavailable                                |
+--------------+--------------------------------------------+
 
 
 
 Support
 
 
+----------------+--------------+---------------------+-----------------+
| Name           | Relationship | Address             | Phone           |
+----------------+--------------+---------------------+-----------------+
| Ada/Ed Radhames | ECON         | [place]              | [phone] |
|                |              | [place], [place]  |                 |
|                |              |  [address]              |                 |
+----------------+--------------+---------------------+-----------------+
 
 
 
 
 Care Team Providers
 
 
+-----------------------+------+-------------+
| Care Team Member Name | Role | Phone       |
+-----------------------+------+-------------+
 PCP  | Unavailable |
+-----------------------+------+-------------+
 
 
 
 Reason for Visit
 
 
+---------+--------------------+
| Reason  | Comments           |
+---------+--------------------+
| Results | overnight oximetry |
+---------+--------------------+
 
 
 
 Encounter Details
 
 
+--------+-----------+----------------------+----------------+---------------------+
| Date   | Type      | Department           | Care Team      | Description         |
+--------+-----------+----------------------+----------------+---------------------+
| / | Telephone |   [facility] [place]          |   [place],  | Results (overnight  |
|    |           | PULMONARY  [address]     | Nena GUSMAN MD  | oximetry)           |
|        |           | [address]  [place], |                |                     |
|        |           |  [place] [address]       |                |                     |
|        |           | [phone]         |                |                     |
+--------+-----------+----------------------+----------------+---------------------+
 
 
 
 Social History
 
 
+--------------+-------+-----------+--------+------+
| Tobacco Use  | Types | Packs/Day | Years  | Date |
|              |       |           | Used   |      |
+--------------+-------+-----------+--------+------+
| Never Smoker |       |           |        |      |
+--------------+-------+-----------+--------+------+
 
 
 
+---------------------+---+---+---+
| Smokeless Tobacco:  |   |   |   |
| Never Used          |   |   |   |
+---------------------+---+---+---+
 
 
 
+---------------------------------------------------------------+
| Comments: some second hand smoke exposure, but fairly minimal |
+---------------------------------------------------------------+
 
 
 
 
+-------------+-------------+---------+----------+
| Alcohol Use | Drinks/Week | oz/Week | Comments |
+-------------+-------------+---------+----------+
| No          |             |         |          |
+-------------+-------------+---------+----------+
 
 
 
+------------------+---------------+
| Sex Assigned at  | Date Recorded |
| Birth            |               |
+------------------+---------------+
| Not on file      |               |
+------------------+---------------+
 
 
 
+----------------+-------------+-------------+
| Job Start Date | Occupation  | Industry    |
+----------------+-------------+-------------+
| Not on file    | Not on file | Not on file |
+----------------+-------------+-------------+
 
 
 
+----------------+--------------+------------+
| Travel History | Travel Start | Travel End |
+----------------+--------------+------------+
 
 
 
+-------------------------------------+
| No recent travel history available. |
+-------------------------------------+
 documented as of this encounter
 
 Plan of Treatment
 
 
+--------+-----------+------------+----------------------+-------------------+
| Date   | Type      | Specialty  | Care Team            | Description       |
+--------+-----------+------------+----------------------+-------------------+
| / | Office    | Cardiology |   Tonia Wilson,    |                   |
| 2020   | Visit     |            | ARNP  [address]   |                   |
|        |           |            | St  [place]  |                   |
|        |           |            | [address]  [phone]  |                   |
|        |           |            |  [phone] (Fax)  |                   |
+--------+-----------+------------+----------------------+-------------------+
| / | Hospital  | Radiology  |   Popeye Townsend, |                   |
|    | Encounter |            |  MD  [address] |                   |
|        |           |            |  St.  [place],   |                   |
|        |           |            | [place] [address]             |                   |
|        |           |            | [phone]         |                   |
|        |           |            | [phone] (Fax)   |                   |
+--------+-----------+------------+----------------------+-------------------+
| / | Surgery   | Radiology  |   Popeye Townsend, | CV EP PPM SYSTEM  |
|    |           |            |  MD  [address] | IMPLANT           |
 
|        |           |            |  St.  [place],   |                   |
|        |           |            | [place] [address]             |                   |
|        |           |            | [phone]         |                   |
|        |           |            | [phone] (Fax)   |                   |
+--------+-----------+------------+----------------------+-------------------+
| 02/10/ | Clinical  | Cardiology |                      |                   |
|    | Support   |            |                      |                   |
+--------+-----------+------------+----------------------+-------------------+
| / | Office    | Cardiology |   Tonia Wilson,    |                   |
|    | Visit     |            | BELKIS  [address]   |                   |
|        |           |            | [place]  |                   |
|        |           |            | [address]  [phone]  |                   |
|        |           |            |  [phone] (Fax)  |                   |
+--------+-----------+------------+----------------------+-------------------+
| / | Off-Site  | Nephrology |   Marzena Moser  |                   |
|    | Visit     |            | DO ETIENNE  [address] [place] [address]      |                   |
|        |           |            | [address]      |                   |
|        |           |            | [place]      |                   |
|        |           |            | [address]  [phone]  |                   |
|        |           |            |  [phone] (Fax)  |                   |
+--------+-----------+------------+----------------------+-------------------+
 documented as of this encounter
 
 Visit Diagnoses
 Not on filedocumented in this encounter

## 2020-01-08 NOTE — XMS
Encounter Summary
  Created on: 2020
 
 Viviana Ricci
 External Reference #: [id number]
 : 47
 Sex: Female
 
 Demographics
 
 
+-----------------------+----------------------+
| Address               | [address]      |
|                       | [place]  [address] |
+-----------------------+----------------------+
| Home Phone            | [phone]      |
+-----------------------+----------------------+
| Preferred Language    | Unknown              |
+-----------------------+----------------------+
| Marital Status        | Single               |
+-----------------------+----------------------+
| Confucianist Affiliation | 1009                 |
+-----------------------+----------------------+
| Race                  | Unknown              |
+-----------------------+----------------------+
| Ethnic Group          | Unknown              |
+-----------------------+----------------------+
 
 
 Author
 
 
+--------------+--------------------------------------------+
| Author       | [facility]  |
|              | and [place]                                |
+--------------+--------------------------------------------+
| Organization | [facility]  |
|              | and [place]                                |
+--------------+--------------------------------------------+
| Address      | Unknown                                    |
+--------------+--------------------------------------------+
| Phone        | Unavailable                                |
+--------------+--------------------------------------------+
 
 
 
 Support
 
 
+----------------+--------------+---------------------+-----------------+
| Name           | Relationship | Address             | Phone           |
+----------------+--------------+---------------------+-----------------+
| Ada/Ed Radhames | ECON         | [address]              | [phone] |
|                |              | [place]  |                 |
|                |              |  [address]              |                 |
+----------------+--------------+---------------------+-----------------+
 
 
 
 
 Care Team Providers
 
 
+------------------------+------+-----------------+
| Care Team Member Name  | Role | Phone           |
+------------------------+------+-----------------+
| Marzena Moser DO | PCP  | [phone] |
+------------------------+------+-----------------+
 
 
 
 Reason for Visit
 
 
+--------+----------+
| Reason | Comments |
+--------+----------+
| Other  |          |
+--------+----------+
 
 
 
 Encounter Details
 
 
+--------+-----------+----------------------+----------------------+-------------+
| Date   | Type      | Department           | Care Team            | Description |
+--------+-----------+----------------------+----------------------+-------------+
| 10/15/ | Telephone |   [facility] [place]          |   Tonia Wilson,    | Other       |
| 2019   |           | CARDIOLOGY  [address]    | ARNP  [address]   |             |
|        |           | [address]  [place], | St  [place], [place]  |             |
|        |           |  [place] [address]       | [address]  [phone]  |             |
|        |           | [phone]         |  [phone] (Fax)  |             |
+--------+-----------+----------------------+----------------------+-------------+
 
 
 
 Social History
 
 
+--------------+-------+-----------+--------+------+
| Tobacco Use  | Types | Packs/Day | Years  | Date |
|              |       |           | Used   |      |
+--------------+-------+-----------+--------+------+
| Never Smoker |       |           |        |      |
+--------------+-------+-----------+--------+------+
 
 
 
+---------------------+---+---+---+
| Smokeless Tobacco:  |   |   |   |
| Never Used          |   |   |   |
+---------------------+---+---+---+
 
 
 
+---------------------------------------------------------------+
| Comments: some second hand smoke exposure, but fairly minimal |
+---------------------------------------------------------------+
 
 
 
 
+-------------+-------------+---------+----------+
| Alcohol Use | Drinks/Week | oz/Week | Comments |
+-------------+-------------+---------+----------+
| No          |             |         |          |
+-------------+-------------+---------+----------+
 
 
 
+------------------+---------------+
| Sex Assigned at  | Date Recorded |
| Birth            |               |
+------------------+---------------+
| Not on file      |               |
+------------------+---------------+
 
 
 
+----------------+-------------+-------------+
| Job Start Date | Occupation  | Industry    |
+----------------+-------------+-------------+
| Not on file    | Not on file | Not on file |
+----------------+-------------+-------------+
 
 
 
+----------------+--------------+------------+
| Travel History | Travel Start | Travel End |
+----------------+--------------+------------+
 
 
 
+-------------------------------------+
| No recent travel history available. |
+-------------------------------------+
 documented as of this encounter
 
 Plan of Treatment
 
 
+--------+-----------+------------+----------------------+-------------------+
| Date   | Type      | Specialty  | Care Team            | Description       |
+--------+-----------+------------+----------------------+-------------------+
| / | Office    | Cardiology |   Tonia Wilson,    |                   |
|    | Visit     |            | ARNP  [address]   |                   |
|        |           |            | St [place], [place]  |                   |
|        |           |            | [address]  [phone]  |                   |
|        |           |            |  [phone] (Fax)  |                   |
+--------+-----------+------------+----------------------+-------------------+
| / | Hospital  | Radiology  |   Popeye Townsend, |                   |
|    | Encounter |            |  MD  [address] |                   |
|        |           |            |  [place],   |                   |
|        |           |            | [place] [address]             |                   |
|        |           |            | [phone]         |                   |
|        |           |            | [phone] (Fax)   |                   |
+--------+-----------+------------+----------------------+-------------------+
| / | Surgery   | Radiology  |   Popeye Townsend, | CV EP PPM SYSTEM  |
|    |           |            |  MD  [address] | IMPLANT           |
 
|        |           |            |  St.  [place],   |                   |
|        |           |            | [place] [address]             |                   |
|        |           |            | [phone]         |                   |
|        |           |            | [phone] (Fax)   |                   |
+--------+-----------+------------+----------------------+-------------------+
| 02/10/ | Clinical  | Cardiology |                      |                   |
|    | Support   |            |                      |                   |
+--------+-----------+------------+----------------------+-------------------+
| / | Office    | Cardiology |   Tonia Wilson,    |                   |
|    | Visit     |            | POP  [address]   |                   |
|        |           |            | [place]  |                   |
|        |           |            | [address]  [phone]  |                   |
|        |           |            |  [phone] (Fax)  |                   |
+--------+-----------+------------+----------------------+-------------------+
| / | Off-Site  | Nephrology |   Marzena Moser  |                   |
|    | Visit     |            | DO ETIENNE  [address] [place] [address]      |                   |
|        |           |            | [address]      |                   |
|        |           |            | [place]      |                   |
|        |           |            | [address] [phone]  |                   |
|        |           |            |  [phone] (Fax)  |                   |
+--------+-----------+------------+----------------------+-------------------+
 documented as of this encounter
 
 Visit Diagnoses
 Not on filedocumented in this encounter

## 2020-01-08 NOTE — XMS
Encounter Summary
  Created on: 2020
 
 Viviana Ricci
 External Reference #: [id number]
 : 47
 Sex: Female
 
 Demographics
 
 
+-----------------------+----------------------+
| Address               | [address]      |
|                       | [place]  [address] |
+-----------------------+----------------------+
| Home Phone            | [phone]      |
+-----------------------+----------------------+
| Preferred Language    | Unknown              |
+-----------------------+----------------------+
| Marital Status        | Single               |
+-----------------------+----------------------+
| [employer] Affiliation | 1009                 |
+-----------------------+----------------------+
| Race                  | Unknown              |
+-----------------------+----------------------+
| Ethnic Group          | Unknown              |
+-----------------------+----------------------+
 
 
 Author
 
 
+--------------+--------------------------------------------+
| Author       | [facility]  |
|              | and [facility]                                |
+--------------+--------------------------------------------+
| Organization | [facility]  |
|              | and [facility]                                |
+--------------+--------------------------------------------+
| Address      | Unknown                                    |
+--------------+--------------------------------------------+
| Phone        | Unavailable                                |
+--------------+--------------------------------------------+
 
 
 
 Support
 
 
+----------------+--------------+---------------------+-----------------+
| Name           | Relationship | Address             | Phone           |
+----------------+--------------+---------------------+-----------------+
| Ada/Ed Radhames | ECON         | [address]              | [phone] |
|                |              | [place]  |                 |
|                |              |  [address]              |                 |
+----------------+--------------+---------------------+-----------------+
 
 
 
 
 Care Team Providers
 
 
+-----------------------+------+-------------+
| Care Team Member Name | Role | Phone       |
+-----------------------+------+-------------+
 PCP  | Unavailable |
+-----------------------+------+-------------+
 
 
 
 Encounter Details
 
 
+--------+----------+---------------------+----------------------+-------------+
| Date   | Type     | Department          | Care Team            | Description |
+--------+----------+---------------------+----------------------+-------------+
| / | Abstract |   [facility] [place]         |   Marzena Moser  |             |
|    |          | NEPHROLOGY  [address]   | M, DO  [address]      |             |
|        |          | [address]   | [address]      |             |
|        |          | [place], [place]     | [place]      |             |
|        |          | [address]          | [address]  [phone]  |             |
|        |          | [phone]        |  [phone] (Fax)  |             |
+--------+----------+---------------------+----------------------+-------------+
 
 
 
 Social History
 
 
+--------------+-------+-----------+--------+------+
| Tobacco Use  | Types | Packs/Day | Years  | Date |
|              |       |           | Used   |      |
+--------------+-------+-----------+--------+------+
| Never Smoker |       |           |        |      |
+--------------+-------+-----------+--------+------+
 
 
 
+---------------------+---+---+---+
| Smokeless Tobacco:  |   |   |   |
| Never Used          |   |   |   |
+---------------------+---+---+---+
 
 
 
+---------------------------------------------------------------+
| Comments: some second hand smoke exposure, but fairly minimal |
+---------------------------------------------------------------+
 
 
 
+-------------+-------------+---------+----------+
| Alcohol Use | Drinks/Week | oz/Week | Comments |
+-------------+-------------+---------+----------+
| No          |             |         |          |
+-------------+-------------+---------+----------+
 
 
 
 
+------------------+---------------+
| Sex Assigned at  | Date Recorded |
| Birth            |               |
+------------------+---------------+
| Not on file      |               |
+------------------+---------------+
 
 
 
+----------------+-------------+-------------+
| Job Start Date | Occupation  | Industry    |
+----------------+-------------+-------------+
| Not on file    | Not on file | Not on file |
+----------------+-------------+-------------+
 
 
 
+----------------+--------------+------------+
| Travel History | Travel Start | Travel End |
+----------------+--------------+------------+
 
 
 
+-------------------------------------+
| No recent travel history available. |
+-------------------------------------+
 documented as of this encounter
 
 Plan of Treatment
 
 
+--------+-----------+------------+----------------------+-------------------+
| Date   | Type      | Specialty  | Care Team            | Description       |
+--------+-----------+------------+----------------------+-------------------+
| / | Office    | Cardiology |   Tonia Wilson,    |                   |
|    | Visit     |            | ARNJESSICA  [address]   |                   |
|        |           |            | [place]  |                   |
|        |           |            | [address]  [phone]  |                   |
|        |           |            |  [phone] (Fax)  |                   |
+--------+-----------+------------+----------------------+-------------------+
| / | Hospital  | Radiology  |   Popeye Townsend, |                   |
|    | Encounter |            |  MD  [address] |                   |
|        |           |            |  St.  [place],   |                   |
|        |           |            | [place] [address]             |                   |
|        |           |            | [phone]         |                   |
|        |           |            | [phone] (Fax)   |                   |
+--------+-----------+------------+----------------------+-------------------+
| / | Surgery   | Radiology  |   Popeye Townsend, | CV EP PPM SYSTEM  |
|    |           |            |  MD  [address] | IMPLANT           |
|        |           |            |  St.  [place],   |                   |
|        |           |            | [place] [address]             |                   |
|        |           |            | [phone]         |                   |
|        |           |            | [phone] (Fax)   |                   |
+--------+-----------+------------+----------------------+-------------------+
| 02/10/ | Clinical  | Cardiology |                      |                   |
|    | Support   |            |                      |                   |
+--------+-----------+------------+----------------------+-------------------+
| / | Office    | Cardiology |   Tonia Wilson,    |                   |
|    | Visit     |            | ARNP  [address]   |                   |
 
|        |           |            | St  [place], [place]  |                   |
|        |           |            | [address]  [phone]  |                   |
|        |           |            |  [phone] (Fax)  |                   |
+--------+-----------+------------+----------------------+-------------------+
| / | Off-Site  | Nephrology |   Marzena Moser  |                   |
|    | Visit     |            | DO ETIENNE  [address] [place] [address]      |                   |
|        |           |            | [address]      |                   |
|        |           |            | [place]      |                   |
|        |           |            | [address]  [phone]  |                   |
|        |           |            |  [phone] (Fax)  |                   |
+--------+-----------+------------+----------------------+-------------------+
 documented as of this encounter
 
 Procedures
 
 
+----------------------+--------+------------+----------------------+----------------------+
| Procedure Name       | Priori | Date/Time  | Associated Diagnosis | Comments             |
|                      | ty     |            |                      |                      |
+----------------------+--------+------------+----------------------+----------------------+
| EXTERNAL LAB: BUN    | Routin | 2016 |                      |   Results for this   |
|                      | e      |            |                      | procedure are in the |
|                      |        |            |                      |  results section.    |
+----------------------+--------+------------+----------------------+----------------------+
| EXTERNAL LAB:        | Routin | 2016 |                      |   Results for this   |
| GLUCOSE              | e      |            |                      | procedure are in the |
|                      |        |            |                      |  results section.    |
+----------------------+--------+------------+----------------------+----------------------+
| EXTERNAL LAB: ALT    | Jerry | 2016 |                      |   Results for this   |
|                      | e      |            |                      | procedure are in the |
|                      |        |            |                      |  results section.    |
+----------------------+--------+------------+----------------------+----------------------+
| EXTERNAL LAB: AST    | Routin | 2016 |                      |   Results for this   |
|                      | e      |            |                      | procedure are in the |
|                      |        |            |                      |  results section.    |
+----------------------+--------+------------+----------------------+----------------------+
| EXTERNAL LAB:        | Routin | 2016 |                      |   Results for this   |
| ALBUMIN              | e      |            |                      | procedure are in the |
|                      |        |            |                      |  results section.    |
+----------------------+--------+------------+----------------------+----------------------+
| EXTERNAL LAB:        | Routin | 2016 |                      |   Results for this   |
| PROTEIN, TOTAL       | e      |            |                      | procedure are in the |
|                      |        |            |                      |  results section.    |
+----------------------+--------+------------+----------------------+----------------------+
| EXTERNAL LAB:        | Routin | 2016 |                      |   Results for this   |
| PHOSPHORUS           | e      |            |                      | procedure are in the |
|                      |        |            |                      |  results section.    |
+----------------------+--------+------------+----------------------+----------------------+
| EXTERNAL LAB:        | Routin | 2016 |                      |   Results for this   |
| MAGNESIUM            | e      |            |                      | procedure are in the |
|                      |        |            |                      |  results section.    |
+----------------------+--------+------------+----------------------+----------------------+
| EXTERNAL LAB:        | Routin | 2016 |                      |   Results for this   |
| CALCIUM              | e      |            |                      | procedure are in the |
|                      |        |            |                      |  results section.    |
+----------------------+--------+------------+----------------------+----------------------+
| EXTERNAL LAB: CARBON | Routin | 2016 |                      |   Results for this   |
|  DIOXIDE             | e      |            |                      | procedure are in the |
|                      |        |            |                      |  results section.    |
+----------------------+--------+------------+----------------------+----------------------+
 
| EXTERNAL LAB:        | Routin | 2016 |                      |   Results for this   |
| CHLORIDE             | e      |            |                      | procedure are in the |
|                      |        |            |                      |  results section.    |
+----------------------+--------+------------+----------------------+----------------------+
| EXTERNAL LAB:        | Routin | 2016 |                      |   Results for this   |
| POTASSIUM            | e      |            |                      | procedure are in the |
|                      |        |            |                      |  results section.    |
+----------------------+--------+------------+----------------------+----------------------+
| EXTERNAL LAB: SODIUM | Routin | 2016 |                      |   Results for this   |
|                      | e      |            |                      | procedure are in the |
|                      |        |            |                      |  results section.    |
+----------------------+--------+------------+----------------------+----------------------+
| EXTERNAL LAB: CBC    | Routin | 2016 |                      |   Results for this   |
|                      | e      |            |                      | procedure are in the |
|                      |        |            |                      |  results section.    |
+----------------------+--------+------------+----------------------+----------------------+
| EXTERNAL LAB:        | Routin | 2016 |                      |   Results for this   |
| TRIGLYCERIDES        | e      |            |                      | procedure are in the |
|                      |        |            |                      |  results section.    |
+----------------------+--------+------------+----------------------+----------------------+
| EXTERNAL LAB:        | Routin | 2016 |                      |   Results for this   |
| CHOLESTEROL, HDL     | e      |            |                      | procedure are in the |
|                      |        |            |                      |  results section.    |
+----------------------+--------+------------+----------------------+----------------------+
| EXTERNAL LAB:        | Routin | 2016 |                      |   Results for this   |
| CHOLESTEROL, TOTAL   | e      |            |                      | procedure are in the |
|                      |        |            |                      |  results section.    |
+----------------------+--------+------------+----------------------+----------------------+
| EXTERNAL LAB:        | Routin | 2016 |                      |   Results for this   |
| CHOLESTEROL, LDL     | e      |            |                      | procedure are in the |
|                      |        |            |                      |  results section.    |
+----------------------+--------+------------+----------------------+----------------------+
| EXTERNAL LAB: EGFR   | Routin | 2016 |                      |   Results for this   |
|                      | e      |            |                      | procedure are in the |
|                      |        |            |                      |  results section.    |
+----------------------+--------+------------+----------------------+----------------------+
| EXTERNAL LAB:        | Routin | 2016 |                      |   Results for this   |
| CREATININE           | e      |            |                      | procedure are in the |
|                      |        |            |                      |  results section.    |
+----------------------+--------+------------+----------------------+----------------------+
| TACROLIMUS ()   | Routin | 2016 |                      |   Results for this   |
| TROUGH               | e      |            |                      | procedure are in the |
|                      |        |            |                      |  results section.    |
+----------------------+--------+------------+----------------------+----------------------+
| HEMOGLOBIN A1C       | Routin | 2016 |                      |   Results for this   |
|                      | e      |            |                      | procedure are in the |
|                      |        |            |                      |  results section.    |
+----------------------+--------+------------+----------------------+----------------------+
 documented in this encounter
 
 Results
 Tacrolimus () Trough (2016)
 
+-------------+-------+-----------+-------------+--------------+
| Component   | Value | Ref Range | Performed   | Pathologist  |
|             |       |           | At          | Signature    |
+-------------+-------+-----------+-------------+--------------+
| Tacrolimus, | 8.4   |           | [facility]  |              |
|  [facility]      |       |           | [facility]    |              |
| External    |       |           | [facility]     |              |
 
|             |       |           | [facility] -    |              |
|             |       |           | LABORATORY  |              |
+-------------+-------+-----------+-------------+--------------+
 
 
 
+-----------------+
| Specimen        |
+-----------------+
| Blood specimen  |
| (specimen)      |
+-----------------+
 
 
 
+----------------------+--------------------+--------------------+----------------+
| Performing           | Address            | City/State/Zipcode | Phone Number   |
| Organization         |                    |                    |                |
+----------------------+--------------------+--------------------+----------------+
|   [facility]     |   [address] | [place]    |   [phone] |
| [facility]  |                    | [address]              |                |
| - LABORATORY         |                    |                    |                |
+----------------------+--------------------+--------------------+----------------+
 Hemoglobin A1C (2016)
 
+-------------+-------+-----------+------------+--------------+
| Component   | Value | Ref Range | Performed  | Pathologist  |
|             |       |           | At         | Signature    |
+-------------+-------+-----------+------------+--------------+
| Hemoglobin  | 8     |           | EXTERNAL   |              |
| A1c,        |       |           | LAB        |              |
| external    |       |           |            |              |
+-------------+-------+-----------+------------+--------------+
 
 
 
+-----------------+
| Specimen        |
+-----------------+
| Blood specimen  |
| (specimen)      |
+-----------------+
 
 
 
+--------------------------+
| Resulting Agency Comment |
+--------------------------+
|   [facility]              |
+--------------------------+
 
 
 
+----------------+---------+--------------------+--------------+
| Performing     | Address | City/State/Zipcode | Phone Number |
| Organization   |         |                    |              |
+----------------+---------+--------------------+--------------+
|   EXTERNAL LAB |         |                    |              |
+----------------+---------+--------------------+--------------+
 External Lab: BUN (2016)
 
 
+-----------+--------+-----------+------------+--------------+
| Component | Value  | Ref Range | Performed  | Pathologist  |
|           |        |           | At         | Signature    |
+-----------+--------+-----------+------------+--------------+
| BUN,      | 30 (A) | 6 - 23    | EXTERNAL   |              |
| External  |        |           | LAB        |              |
+-----------+--------+-----------+------------+--------------+
 
 
 
+--------------------------+
| Resulting Agency Comment |
+--------------------------+
|   [facility]              |
+--------------------------+
 
 
 
+----------------+---------+--------------------+--------------+
| Performing     | Address | City/State/Zipcode | Phone Number |
| Organization   |         |                    |              |
+----------------+---------+--------------------+--------------+
|   EXTERNAL LAB |         |                    |              |
+----------------+---------+--------------------+--------------+
 External Lab: Glucose (2016)
 
+-----------+---------+-----------+------------+--------------+
| Component | Value   | Ref Range | Performed  | Pathologist  |
|           |         |           | At         | Signature    |
+-----------+---------+-----------+------------+--------------+
| Glucose,  | 140 (A) | 70 - 100  | EXTERNAL   |              |
| External  |         |           | LAB        |              |
+-----------+---------+-----------+------------+--------------+
 
 
 
+--------------------------+
| Resulting Agency Comment |
+--------------------------+
|   [facility]              |
+--------------------------+
 
 
 
+----------------+---------+--------------------+--------------+
| Performing     | Address | City/State/Zipcode | Phone Number |
| Organization   |         |                    |              |
+----------------+---------+--------------------+--------------+
|   EXTERNAL LAB |         |                    |              |
+----------------+---------+--------------------+--------------+
 External Lab: ALT (2016)
 
+-----------+-------+-----------+------------+--------------+
| Component | Value | Ref Range | Performed  | Pathologist  |
|           |       |           | At         | Signature    |
+-----------+-------+-----------+------------+--------------+
| ALT,      | 14    | 7 - 52    | EXTERNAL   |              |
| External  |       |           | LAB        |              |
+-----------+-------+-----------+------------+--------------+
 
 
 
 
+--------------------------+
| Resulting Agency Comment |
+--------------------------+
|   [facility]              |
+--------------------------+
 
 
 
+----------------+---------+--------------------+--------------+
| Performing     | Address | City/State/Zipcode | Phone Number |
| Organization   |         |                    |              |
+----------------+---------+--------------------+--------------+
|   EXTERNAL LAB |         |                    |              |
+----------------+---------+--------------------+--------------+
 External Lab: AST (2016)
 
+-----------+-------+-----------+------------+--------------+
| Component | Value | Ref Range | Performed  | Pathologist  |
|           |       |           | At         | Signature    |
+-----------+-------+-----------+------------+--------------+
| AST,      | 26    | 13 - 39   | EXTERNAL   |              |
| External  |       |           | LAB        |              |
+-----------+-------+-----------+------------+--------------+
 
 
 
+--------------------------+
| Resulting Agency Comment |
+--------------------------+
|   [facility]              |
+--------------------------+
 
 
 
+----------------+---------+--------------------+--------------+
| Performing     | Address | City/State/Zipcode | Phone Number |
| Organization   |         |                    |              |
+----------------+---------+--------------------+--------------+
|   EXTERNAL LAB |         |                    |              |
+----------------+---------+--------------------+--------------+
 External Lab: Albumin (2016)
 
+-----------+-------+-----------+------------+--------------+
| Component | Value | Ref Range | Performed  | Pathologist  |
|           |       |           | At         | Signature    |
+-----------+-------+-----------+------------+--------------+
| Albumin,  | 3.7   | 3.5 - 5   | EXTERNAL   |              |
| External  |       |           | LAB        |              |
+-----------+-------+-----------+------------+--------------+
 
 
 
+--------------------------+
| Resulting Agency Comment |
+--------------------------+
|   [facility]              |
+--------------------------+
 
 
 
 
+----------------+---------+--------------------+--------------+
| Performing     | Address | City/State/Zipcode | Phone Number |
| Organization   |         |                    |              |
+----------------+---------+--------------------+--------------+
|   EXTERNAL LAB |         |                    |              |
+----------------+---------+--------------------+--------------+
 External Lab: Protein, Total (2016)
 
+-----------+---------+-----------+------------+--------------+
| Component | Value   | Ref Range | Performed  | Pathologist  |
|           |         |           | At         | Signature    |
+-----------+---------+-----------+------------+--------------+
| Protein,  | 5.9 (A) | 6 - 8     | EXTERNAL   |              |
| Total,    |         |           | LAB        |              |
| External  |         |           |            |              |
+-----------+---------+-----------+------------+--------------+
 
 
 
+--------------------------+
| Resulting Agency Comment |
+--------------------------+
|   [facility]              |
+--------------------------+
 
 
 
+----------------+---------+--------------------+--------------+
| Performing     | Address | City/State/Zipcode | Phone Number |
| Organization   |         |                    |              |
+----------------+---------+--------------------+--------------+
|   EXTERNAL LAB |         |                    |              |
+----------------+---------+--------------------+--------------+
 External Lab: Phosphorus (2016)
 
+-------------+-------+-----------+------------+--------------+
| Component   | Value | Ref Range | Performed  | Pathologist  |
|             |       |           | At         | Signature    |
+-------------+-------+-----------+------------+--------------+
| Phosphorus, | 2.5   | 2.5 - 5   | EXTERNAL   |              |
|  External   |       |           | LAB        |              |
+-------------+-------+-----------+------------+--------------+
 
 
 
+--------------------------+
| Resulting Agency Comment |
+--------------------------+
|   [facility]              |
+--------------------------+
 
 
 
+----------------+---------+--------------------+--------------+
| Performing     | Address | City/State/Zipcode | Phone Number |
| Organization   |         |                    |              |
+----------------+---------+--------------------+--------------+
 
|   EXTERNAL LAB |         |                    |              |
+----------------+---------+--------------------+--------------+
 External Lab: Magnesium (2016)
 
+-------------+---------+-----------+------------+--------------+
| Component   | Value   | Ref Range | Performed  | Pathologist  |
|             |         |           | At         | Signature    |
+-------------+---------+-----------+------------+--------------+
| Magnesium,  | 1.6 (A) | 1.7 - 2.5 | EXTERNAL   |              |
| External    |         |           | LAB        |              |
+-------------+---------+-----------+------------+--------------+
 
 
 
+--------------------------+
| Resulting Agency Comment |
+--------------------------+
|   [facility]              |
+--------------------------+
 
 
 
+----------------+---------+--------------------+--------------+
| Performing     | Address | City/State/Zipcode | Phone Number |
| Organization   |         |                    |              |
+----------------+---------+--------------------+--------------+
|   EXTERNAL LAB |         |                    |              |
+----------------+---------+--------------------+--------------+
 External Lab: Calcium (2016)
 
+-----------+----------+------------+------------+--------------+
| Component | Value    | Ref Range  | Performed  | Pathologist  |
|           |          |            | At         | Signature    |
+-----------+----------+------------+------------+--------------+
| Calcium,  | 10.3 (A) | 8.4 - 10.2 | EXTERNAL   |              |
| External  |          |            | LAB        |              |
+-----------+----------+------------+------------+--------------+
 
 
 
+--------------------------+
| Resulting Agency Comment |
+--------------------------+
|   [facility]              |
+--------------------------+
 
 
 
+----------------+---------+--------------------+--------------+
| Performing     | Address | City/State/Zipcode | Phone Number |
| Organization   |         |                    |              |
+----------------+---------+--------------------+--------------+
|   EXTERNAL LAB |         |                    |              |
+----------------+---------+--------------------+--------------+
 External Lab: Carbon Dioxide (2016)
 
+-----------+--------+-----------+------------+--------------+
| Component | Value  | Ref Range | Performed  | Pathologist  |
|           |        |           | At         | Signature    |
+-----------+--------+-----------+------------+--------------+
 
| Carbon    | 18 (A) | 19 - 31   | EXTERNAL   |              |
| Dioxide,  |        |           | LAB        |              |
| External  |        |           |            |              |
+-----------+--------+-----------+------------+--------------+
 
 
 
+--------------------------+
| Resulting Agency Comment |
+--------------------------+
|   [facility]              |
+--------------------------+
 
 
 
+----------------+---------+--------------------+--------------+
| Performing     | Address | City/State/Zipcode | Phone Number |
| Organization   |         |                    |              |
+----------------+---------+--------------------+--------------+
|   EXTERNAL LAB |         |                    |              |
+----------------+---------+--------------------+--------------+
 External Lab: Chloride (2016)
 
+------------+-------+-----------+------------+--------------+
| Component  | Value | Ref Range | Performed  | Pathologist  |
|            |       |           | At         | Signature    |
+------------+-------+-----------+------------+--------------+
| Chloride,  | 106   | 95 - 112  | EXTERNAL   |              |
| External   |       |           | LAB        |              |
+------------+-------+-----------+------------+--------------+
 
 
 
+--------------------------+
| Resulting Agency Comment |
+--------------------------+
|   [facility]              |
+--------------------------+
 
 
 
+----------------+---------+--------------------+--------------+
| Performing     | Address | City/State/Zipcode | Phone Number |
| Organization   |         |                    |              |
+----------------+---------+--------------------+--------------+
|   EXTERNAL LAB |         |                    |              |
+----------------+---------+--------------------+--------------+
 External Lab: Potassium (2016)
 
+-------------+-------+-----------+------------+--------------+
| Component   | Value | Ref Range | Performed  | Pathologist  |
|             |       |           | At         | Signature    |
+-------------+-------+-----------+------------+--------------+
| Potassium,  | 4.6   | 3.6 - 5.1 | EXTERNAL   |              |
| External    |       |           | LAB        |              |
+-------------+-------+-----------+------------+--------------+
 
 
 
+--------------------------+
 
| Resulting Agency Comment |
+--------------------------+
|   [facility]              |
+--------------------------+
 
 
 
+----------------+---------+--------------------+--------------+
| Performing     | Address | City/State/Zipcode | Phone Number |
| Organization   |         |                    |              |
+----------------+---------+--------------------+--------------+
|   EXTERNAL LAB |         |                    |              |
+----------------+---------+--------------------+--------------+
 External Lab: Sodium (2016)
 
+-----------+-------+-----------+------------+--------------+
| Component | Value | Ref Range | Performed  | Pathologist  |
|           |       |           | At         | Signature    |
+-----------+-------+-----------+------------+--------------+
| Sodium,   | 138   | 132 - 143 | EXTERNAL   |              |
| External  |       |           | LAB        |              |
+-----------+-------+-----------+------------+--------------+
 
 
 
+--------------------------+
| Resulting Agency Comment |
+--------------------------+
|   [facility]              |
+--------------------------+
 
 
 
+----------------+---------+--------------------+--------------+
| Performing     | Address | City/State/Zipcode | Phone Number |
| Organization   |         |                    |              |
+----------------+---------+--------------------+--------------+
|   EXTERNAL LAB |         |                    |              |
+----------------+---------+--------------------+--------------+
 External Lab: CBC (2016)
 
+-----------+---------+-----------+------------+--------------+
| Component | Value   | Ref Range | Performed  | Pathologist  |
|           |         |           | At         | Signature    |
+-----------+---------+-----------+------------+--------------+
| WBC,      | 4.0 (A) | 4.5 - 11  | EXTERNAL   |              |
| External  |         |           | LAB        |              |
+-----------+---------+-----------+------------+--------------+
| HGB,      | 13.9    | 12 - 16   | EXTERNAL   |              |
| External  |         |           | LAB        |              |
+-----------+---------+-----------+------------+--------------+
| HCT,      | 42      | 35 - 45   | EXTERNAL   |              |
| External  |         |           | LAB        |              |
+-----------+---------+-----------+------------+--------------+
| PLT,      | 153     | 140 - 440 | EXTERNAL   |              |
| External  |         |           | LAB        |              |
+-----------+---------+-----------+------------+--------------+
| RBC,      | 4.16    | 3.8 - 5.1 | EXTERNAL   |              |
| External  |         |           | LAB        |              |
+-----------+---------+-----------+------------+--------------+
 
| MCV,      | 101 (A) | 81 - 99   | EXTERNAL   |              |
| External  |         |           | LAB        |              |
+-----------+---------+-----------+------------+--------------+
| RDW,      | 13.5    | 10.5 - 15 | EXTERNAL   |              |
| External  |         |           | LAB        |              |
+-----------+---------+-----------+------------+--------------+
 
 
 
+--------------------------+
| Resulting Agency Comment |
+--------------------------+
|   [facility]              |
+--------------------------+
 
 
 
+----------------+---------+--------------------+--------------+
| Performing     | Address | City/State/Zipcode | Phone Number |
| Organization   |         |                    |              |
+----------------+---------+--------------------+--------------+
|   EXTERNAL LAB |         |                    |              |
+----------------+---------+--------------------+--------------+
 External Lab: Triglycerides (2016)
 
+-------------+-------+-----------+------------+--------------+
| Component   | Value | Ref Range | Performed  | Pathologist  |
|             |       |           | At         | Signature    |
+-------------+-------+-----------+------------+--------------+
| Triglycerid | 167   |           | EXTERNAL   |              |
| es,         |       |           | LAB        |              |
| External    |       |           |            |              |
+-------------+-------+-----------+------------+--------------+
 
 
 
+-----------------+
| Specimen        |
+-----------------+
| Blood specimen  |
| (specimen)      |
+-----------------+
 
 
 
+--------------------------+
| Resulting Agency Comment |
+--------------------------+
|   [facility]              |
+--------------------------+
 
 
 
+----------------+---------+--------------------+--------------+
| Performing     | Address | City/State/Zipcode | Phone Number |
| Organization   |         |                    |              |
+----------------+---------+--------------------+--------------+
|   EXTERNAL LAB |         |                    |              |
+----------------+---------+--------------------+--------------+
 External Lab: Cholesterol, HDL (2016)
 
 
+-------------+-------+-----------+------------+--------------+
| Component   | Value | Ref Range | Performed  | Pathologist  |
|             |       |           | At         | Signature    |
+-------------+-------+-----------+------------+--------------+
| HDL         | 45.4  | mg/dl     | EXTERNAL   |              |
| Cholesterol |       |           | LAB        |              |
| , External  |       |           |            |              |
+-------------+-------+-----------+------------+--------------+
 
 
 
+-----------------+
| Specimen        |
+-----------------+
| Blood specimen  |
| (specimen)      |
+-----------------+
 
 
 
+--------------------------+
| Resulting Agency Comment |
+--------------------------+
|   [facility]              |
+--------------------------+
 
 
 
+----------------+---------+--------------------+--------------+
| Performing     | Address | City/State/Zipcode | Phone Number |
| Organization   |         |                    |              |
+----------------+---------+--------------------+--------------+
|   EXTERNAL LAB |         |                    |              |
+----------------+---------+--------------------+--------------+
 External Lab: Cholesterol, Total (2016)
 
+-------------+-------+-----------+------------+--------------+
| Component   | Value | Ref Range | Performed  | Pathologist  |
|             |       |           | At         | Signature    |
+-------------+-------+-----------+------------+--------------+
| Cholesterol | 161   | mg/dl     | EXTERNAL   |              |
| , Total,    |       |           | LAB        |              |
| External    |       |           |            |              |
+-------------+-------+-----------+------------+--------------+
 
 
 
+-----------------+
| Specimen        |
+-----------------+
| Blood specimen  |
| (specimen)      |
+-----------------+
 
 
 
+--------------------------+
| Resulting Agency Comment |
+--------------------------+
 
|   [facility]              |
+--------------------------+
 
 
 
+----------------+---------+--------------------+--------------+
| Performing     | Address | City/State/Zipcode | Phone Number |
| Organization   |         |                    |              |
+----------------+---------+--------------------+--------------+
|   EXTERNAL LAB |         |                    |              |
+----------------+---------+--------------------+--------------+
 External Lab: Cholesterol, LDL (2016)
 
+-------------+-------+-----------+------------+--------------+
| Component   | Value | Ref Range | Performed  | Pathologist  |
|             |       |           | At         | Signature    |
+-------------+-------+-----------+------------+--------------+
| LDL         | 82    |           | EXTERNAL   |              |
| Cholesterol |       |           | LAB        |              |
| , Direct,   |       |           |            |              |
| External    |       |           |            |              |
+-------------+-------+-----------+------------+--------------+
 
 
 
+-----------------+
| Specimen        |
+-----------------+
| Blood specimen  |
| (specimen)      |
+-----------------+
 
 
 
+--------------------------+
| Resulting Agency Comment |
+--------------------------+
|   [facility]              |
+--------------------------+
 
 
 
+----------------+---------+--------------------+--------------+
| Performing     | Address | City/State/Zipcode | Phone Number |
| Organization   |         |                    |              |
+----------------+---------+--------------------+--------------+
|   EXTERNAL LAB |         |                    |              |
+----------------+---------+--------------------+--------------+
 External Lab: eGFR (2016)
 
+-----------+-------+-----------+------------+--------------+
| Component | Value | Ref Range | Performed  | Pathologist  |
|           |       |           | At         | Signature    |
+-----------+-------+-----------+------------+--------------+
| eGFR,     | 45    |           | EXTERNAL   |              |
| External  |       |           | LAB        |              |
+-----------+-------+-----------+------------+--------------+
 
 
 
 
+-----------------+
| Specimen        |
+-----------------+
| Blood specimen  |
| (specimen)      |
+-----------------+
 
 
 
+--------------------------+
| Resulting Agency Comment |
+--------------------------+
|   [facility]              |
+--------------------------+
 
 
 
+----------------+---------+--------------------+--------------+
| Performing     | Address | City/State/Zipcode | Phone Number |
| Organization   |         |                    |              |
+----------------+---------+--------------------+--------------+
|   EXTERNAL LAB |         |                    |              |
+----------------+---------+--------------------+--------------+
 External Lab: Creatinine (2016)
 
+-------------+-------+------------+------------+--------------+
| Component   | Value | Ref Range  | Performed  | Pathologist  |
|             |       |            | At         | Signature    |
+-------------+-------+------------+------------+--------------+
| Creatinine, | 1.2   | 0.7 - 1.25 | EXTERNAL   |              |
|  External   |       |            | LAB        |              |
+-------------+-------+------------+------------+--------------+
 
 
 
+-----------------+
| Specimen        |
+-----------------+
| Blood specimen  |
| (specimen)      |
+-----------------+
 
 
 
+--------------------------+
| Resulting Agency Comment |
+--------------------------+
|   [facility]              |
+--------------------------+
 
 
 
+----------------+---------+--------------------+--------------+
| Performing     | Address | City/State/Zipcode | Phone Number |
| Organization   |         |                    |              |
+----------------+---------+--------------------+--------------+
|   EXTERNAL LAB |         |                    |              |
+----------------+---------+--------------------+--------------+
 documented in this encounter
 
 
 Visit Diagnoses
 Not on filedocumented in this encounter

## 2020-01-08 NOTE — XMS
Encounter Summary
  Created on: 2020
 
 Viviana Ricci
 External Reference #: [id number]
 : 47
 Sex: Female
 
 Demographics
 
 
+-----------------------+----------------------+
| Address               | [address]      |
|                       | [place]  [address] |
+-----------------------+----------------------+
| Home Phone            | [phone]      |
+-----------------------+----------------------+
| Preferred Language    | Unknown              |
+-----------------------+----------------------+
| Marital Status        | Single               |
+-----------------------+----------------------+
| Islam Affiliation | [id number]                 |
+-----------------------+----------------------+
| Race                  | Unknown              |
+-----------------------+----------------------+
| Ethnic Group          | Unknown              |
+-----------------------+----------------------+
 
 
 Author
 
 
+--------------+--------------------------------------------+
| Author       | [facility]  |
|              | and [facility]                                |
+--------------+--------------------------------------------+
| Organization | [facility]  |
|              | and [facility]                                |
+--------------+--------------------------------------------+
| Address      | Unknown                                    |
+--------------+--------------------------------------------+
| Phone        | Unavailable                                |
+--------------+--------------------------------------------+
 
 
 
 Support
 
 
+----------------+--------------+---------------------+-----------------+
| Name           | Relationship | Address             | Phone           |
+----------------+--------------+---------------------+-----------------+
| Ada/Ed Radhames | ECON         | [address]              | [phone] |
|                |              | [place]  |                 |
|                |              |  [address]              |                 |
+----------------+--------------+---------------------+-----------------+
 
 
 
 
 Care Team Providers
 
 
+-----------------------+------+-------------+
| Care Team Member Name | Role | Phone       |
+-----------------------+------+-------------+
 PCP  | Unavailable |
+-----------------------+------+-------------+
 
 
 
 Encounter Details
 
 
+--------+-----------+---------------------+----------------------+----------------------+
| Date   | Type      | Department          | Care Team            | Description          |
+--------+-----------+---------------------+----------------------+----------------------+
| / | Off-Site  |   [facility]         |   Marzena Moser  | Complications of     |
|    | Visit     | NEPHROLOGY  [address]   | M, DO  [address]      | transplanted kidney  |
|        |           | [address]   | [address]      | (Primary Dx); Asthma |
|        |           | [place], [place]     | [place], [place]      |  attack; Unspecified |
|        |           | [address]          | [address]  [phone]  |  hypertensive kidney |
|        |           | [phone]        |  [phone] (Fax)  |  disease with        |
|        |           |                     |                      | chronic kidney       |
|        |           |                     |                      | disease stage I      |
|        |           |                     |                      | through stage IV, or |
|        |           |                     |                      |  unspecified;        |
|        |           |                     |                      | DIABETES MELLITUS,   |
|        |           |                     |                      | TYPE II,             |
|        |           |                     |                      | UNCONTROLLED         |
+--------+-----------+---------------------+----------------------+----------------------+
 
 
 
 Social History
 
 
+--------------+-------+-----------+--------+------+
| Tobacco Use  | Types | Packs/Day | Years  | Date |
|              |       |           | Used   |      |
+--------------+-------+-----------+--------+------+
| Never Smoker |       |           |        |      |
+--------------+-------+-----------+--------+------+
 
 
 
+---------------------+---+---+---+
| Smokeless Tobacco:  |   |   |   |
| Never Used          |   |   |   |
+---------------------+---+---+---+
 
 
 
+---------------------------------------------------------------+
| Comments: some second hand smoke exposure, but fairly minimal |
+---------------------------------------------------------------+
 
 
 
 
+-------------+-------------+---------+----------+
| Alcohol Use | Drinks/Week | oz/Week | Comments |
+-------------+-------------+---------+----------+
| No          |             |         |          |
+-------------+-------------+---------+----------+
 
 
 
+------------------+---------------+
| Sex Assigned at  | Date Recorded |
| Birth            |               |
+------------------+---------------+
| Not on file      |               |
+------------------+---------------+
 
 
 
+----------------+-------------+-------------+
| Job Start Date | Occupation  | Industry    |
+----------------+-------------+-------------+
| Not on file    | Not on file | Not on file |
+----------------+-------------+-------------+
 
 
 
+----------------+--------------+------------+
| Travel History | Travel Start | Travel End |
+----------------+--------------+------------+
 
 
 
+-------------------------------------+
| No recent travel history available. |
+-------------------------------------+
 documented as of this encounter
 
 Last Filed Vital Signs
 
 
+-------------------+----------------------+----------------------+----------+
| Vital Sign        | Reading              | Time Taken           | Comments |
+-------------------+----------------------+----------------------+----------+
| Blood Pressure    | 124/68               | 2013  6:56 PM  |          |
|                   |                      | PDT                  |          |
+-------------------+----------------------+----------------------+----------+
| Pulse             | -                    | -                    |          |
+-------------------+----------------------+----------------------+----------+
| Temperature       | 35.8   C (96.5   F)  | 2013  6:56 PM  |          |
|                   |                      | PDT                  |          |
+-------------------+----------------------+----------------------+----------+
| Respiratory Rate  | -                    | -                    |          |
+-------------------+----------------------+----------------------+----------+
| Oxygen Saturation | -                    | -                    |          |
+-------------------+----------------------+----------------------+----------+
| Inhaled Oxygen    | -                    | -                    |          |
| Concentration     |                      |                      |          |
+-------------------+----------------------+----------------------+----------+
| Weight            | 134.8 kg (297 lb 2.9 | 2013  6:56 PM  |          |
|                   |  oz)                 | PDT                  |          |
+-------------------+----------------------+----------------------+----------+
 
| Height            | -                    | -                    |          |
+-------------------+----------------------+----------------------+----------+
| Body Mass Index   | 49.45                | 2013  2:12 PM  |          |
|                   |                      | PDT                  |          |
+-------------------+----------------------+----------------------+----------+
 documented in this encounter
 
 Progress Notes
 Marzena Moser DO - 2013  6:58 PM PDTFormatting of this note might be different
 from the original.
 Subjective:  NEPHROLOGY 
  
 Patient ID: Viviana Ricci is a 67 y.o. female.
 
 HPI Comments: 
 Followup for this 67 year old white female s/p renal allograft, 05, with previous allo
graft dysfunction secondary to FSGS, also with Type II DM, hypertension, hypothyroidism, hyp
erlipidemia, 
 SHPTH,  previous low back pain, obesity/JACK, chronic pulmonary  hypertension, possibly rela
jeanie to obesity?, and  CAD, s/p CABG x3 vessels,.   She states that she has been troubled
 by dyspnea and cough x 2 weeks.  At times her cough is productive of "white" sputum.  She h
as not had fever, headache, with this.  She has taken a course of an unknown PO antibiotic w
angelesout improvement.  She states that she is working closely with Dr. Nena Boykin for 
this.   She is due to see her tomorrow for possibly a methylcholine challenge test.
 
 On further discussion, she inquires whether she would be candidate for bariatric surgery, a
s apparently she is very discourage with her large BMI for several years.  
 
 MEDS: 
 
 Prograf 1.5 mg, BID
 Mycophenolate 250 mg, TID.
 Prednisone 5 mg, daily.
 metoprolol tartrate (LOPRESSOR) 25 mg tablet, Take 1 tablet by mouth 2 times daily., Disp: 
60 tablet, Rfl: 11
 Prenatal Multivit-Min-Fe-FA (PRENATAL VITAMINS) 0.8 MG TABS, Take 0.8 mg by mouth Daily., D
isp: 30 each, Rfl: 11
 valsartan (DIOVAN) 160 mg tablet, Take 1 tablet by mouth Daily., Disp: 30 tablet, Rfl: 11
 albuterol (PROAIR HFA) 90 mcg/puff inhaler, Inhale 2 puffs into the lungs every 6 hours as 
needed for Wheezing., Disp: 1 Inhaler, Rfl: 2
 lisinopril (PRINIVIL,ZESTRIL) 30 MG tablet, Take 1 tablet by mouth Daily., Disp: 90 tablet,
 Rfl: 3 glucose blood VI test strips (ONE TOUCH ULTRA TEST) strip, Check blood sugar before 
each meal and as directed, Disp: 100 each, Rfl: 12
 furosemide (LASIX) 40 mg tablet, Take two tablets by mouth daily., Disp: 60 tablet, Rfl: 5
 rosuvastatin (CRESTOR) 40 MG tablet, Take 40 mg by mouth nightly.  , Disp: , Rfl:  
 insulin glargine (LANTUS) 100 units/mL injection, Inject 20 Units under the skin every morn
ing.  , Disp: , Rfl: 
 cinacalcet (SENSIPAR) 30 mg tablet, Take 1 tablet by mouth Daily., Disp: 30 tablet, Rfl: 12
 fludrocortisone (FLORINEF) 0.1 mg tablet, Take 1 tablet by mouth Every other day., Disp: 30
 tablet, Rfl: 11
 levothyroxine (LEVOTHROID) 50 mcg tablet, Take 1 tablet by mouth Daily., Disp: 30 tablet, R
fl: 11
 omeprazole (PRILOSEC) 20 mg capsule, Take one capsule by mouth once daily on an empty stoma
ch, Disp: , Rfl: 
 allopurinol (ZYLOPRIM) 100 mg tablet, Take 100 mg by mouth Daily., Disp: , Rfl: 
 aspirin 81 MG EC tablet, Take 81 mg by mouth Daily., Disp: , Rfl: 
 loperamide (ANTI-DIARRHEAL) 2 mg capsule, Take 2 mg by mouth Daily as needed.,  
 insulin lispro (HUMALOG) 100 units/mL injection, Inject subcutaneously before meals accordi
ng to sliding scale, Disp: , Rfl: 
 Cholecalciferol (VITAMIN D3) 5000 UNITS CAPS, Take 5,000 Units by mouth Once a week., Disp:
 
 , Rfl: 
 magnesium oxide (MAG-OX) 400 mg tablet, Take 400 mg by mouth 2 times daily., Disp: , Rfl: 
 
    
  Review of Systems
 
 No Known Allergies
 
 Objective:   Blood pressure 124/68, temperature 35.8 C (96.5 F), weight 134.8 kg (297 l
b 2.9 oz).
 
  
 Physical Exam
 
 HEENT: No thrush.
 Heart:  Regular rate and rhythm with no S3, S4, murmur or rub.
 Lungs:   (+) bilateral expiratory wheezes at both bases.  No rales.
 Abdomen:  Soft, flat,  Renal allograft in RLQ is nontender, normoactive bowel sounds.
 Extremities: No clubbing, edema, or foot ulcers.
 
 LAB:   BUN 69, Cr 1.85, K+ 5.0, HCO3 17, Glu 137, Mg++ 2.0, Hbaic = 8.2%,  , HDL 45,
  LDL 72, ,  WBC 4.2, H/H= 12.5/ 37.0, ,000, Tacrolimus = 5.7 ng/ml.
 
 Assessment: 
 
 1.  Renal Allograft--Scr is above previous baseline.  Her BUN:Cr ration appears consistent 
with some ECF volume contraction?
 2.  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
 3.  Type 2 DM--suboptimal control.
 4.  Hyperlipidemia--fairly good control.
 5.  Hypertension--stable.
 6.  SHPTH--about same.
 7.  Obesity/JACK/Pulmonary hypertension--unfortunately, she is having little success with we
ight loss?
 8.  CAD, s/p CABG, --stable on ASA, metoprolol, atorvastatin.
 9.  Type IV RTA--stable.
 10. Chronic Low Back pain--in remission.
 11.  Possible acute exacerbation of asthma?
 
 Plan: 
 
 1.   I encouraged  Viviana that given her lab, she should try to orally salt-load herself for 
the next 72 hours.   She should then recheck her standing order after that, and see if thing
s are improved.
 Her Prograf level appears reasonable.
 2.  In regards to her cough with wheezing--CXR not classic  for acute CHF.  Given her physi
claudia findings, would suspect asthma is primary culprit at this point?  Will defer any prednis
one Rx until she has had her appointment with Dr. Nena Boykin , tomorrow.
 3.  Will review her lab next week when available.
 4.   Will continue the current immunosuppression for now.
 5.   She will have a repeat appt. In 6 weeks.   At the end of the exam, she earnestly inqui
res about whether she would be a candidate for bariatric surgery.  Given her Type II DM, and
 difficulty with control, with current BMI >40, I think that there is a reasonably good argu
ment for it?
 
 CC:  Nena aDlal M.D., [facility] CenterElectronically signed by Marzena mak DO at 2013  3:49 PM PDTdocumented in this encounter
 
 Plan of Treatment
 
 
 
+--------+-----------+------------+----------------------+-------------------+
| Date   | Type      | Specialty  | Care Team            | Description       |
+--------+-----------+------------+----------------------+-------------------+
| / | Office    | Cardiology |   Tonia Wilson,    |                   |
|    | Visit     |            | ARNJESSICA  [address]   |                   |
|        |           |            | St  [place] [facility], [place]  |                   |
|        |           |            | [address]  [phone]  |                   |
|        |           |            |  [phone] (Fax)  |                   |
+--------+-----------+------------+----------------------+-------------------+
| / | Hospital  | Radiology  |   Popeye Townsend, |                   |
|    | Encounter |            |  MD  [address] |                   |
|        |           |            |  [place],   |                   |
|        |           |            | [place] [address]             |                   |
|        |           |            | [phone]         |                   |
|        |           |            | [phone] (Fax)   |                   |
+--------+-----------+------------+----------------------+-------------------+
| / | Surgery   | Radiology  |   Popeye Townsend, | CV EP PPM SYSTEM  |
|    |           |            |  MD  [address] | IMPLANT           |
|        |           |            |  [place],   |                   |
|        |           |            | [place] [address]             |                   |
|        |           |            | [phone]         |                   |
|        |           |            | [phone] (Fax)   |                   |
+--------+-----------+------------+----------------------+-------------------+
| 02/10/ | Clinical  | Cardiology |                      |                   |
|    | Support   |            |                      |                   |
+--------+-----------+------------+----------------------+-------------------+
| / | Office    | Cardiology |   Tonia Wilson,    |                   |
|    | Visit     |            | [facility]  [address]   |                   |
|        |           |            | [place]  |                   |
|        |           |            | [address]  [phone]  |                   |
|        |           |            |  [phone] (Fax)  |                   |
+--------+-----------+------------+----------------------+-------------------+
| / | Off-Site  | Nephrology |   Marzena Moser  |                   |
|    | Visit     |            | DO ETIENNE  [address] [place] [address]      |                   |
|        |           |            | [address]      |                   |
|        |           |            | [place]      |                   |
|        |           |            | [id number] [phone]  |                   |
|        |           |            |  [phone] (Fax)  |                   |
+--------+-----------+------------+----------------------+-------------------+
 documented as of this encounter
 
 Visit Diagnoses
 
 
+-----------------------------------------------------------------------------------------+
| Diagnosis                                                                               |
+-----------------------------------------------------------------------------------------+
|   Complications of transplanted kidney - Primary                                        |
+-----------------------------------------------------------------------------------------+
|   Asthma attack  Unspecified asthma                                                     |
+-----------------------------------------------------------------------------------------+
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
| stage IV, or unspecified(403.90)  Unspecified hypertensive kidney disease with chronic  |
| kidney disease stage I through stage IV, or unspecified                                 |
+-----------------------------------------------------------------------------------------+
|   DIABETES MELLITUS, TYPE II, UNCONTROLLED  Type II or unspecified type diabetes        |
| mellitus without mention of complication, uncontrolled                                  |
+-----------------------------------------------------------------------------------------+
 
 documented in this encounter

## 2020-01-08 NOTE — XMS
Encounter Summary
  Created on: 2020
 
 Viviana Ricci
 External Reference #: [id number]
 : 47
 Sex: Female
 
 Demographics
 
 
+-----------------------+----------------------+
| Address               | [address]      |
|                       | [place]  [address] |
+-----------------------+----------------------+
| Home Phone            | [phone]      |
+-----------------------+----------------------+
| Preferred Language    | Unknown              |
+-----------------------+----------------------+
| Marital Status        | Single               |
+-----------------------+----------------------+
| Faith Affiliation | [id number]                 |
+-----------------------+----------------------+
| Race                  | Unknown              |
+-----------------------+----------------------+
| Ethnic Group          | Unknown              |
+-----------------------+----------------------+
 
 
 Author
 
 
+--------------+--------------------------------------------+
| Author       | [facility]  |
|              | and [facility]                                |
+--------------+--------------------------------------------+
| Organization | [facility]  |
|              | and [facility]                                |
+--------------+--------------------------------------------+
| Address      | Unknown                                    |
+--------------+--------------------------------------------+
| Phone        | Unavailable                                |
+--------------+--------------------------------------------+
 
 
 
 Support
 
 
+----------------+--------------+---------------------+-----------------+
| Name           | Relationship | Address             | Phone           |
+----------------+--------------+---------------------+-----------------+
| Ada/Ed Radhames | ECON         | [address]              | [phone] |
|                |              | [place]  |                 |
|                |              |  [address]              |                 |
+----------------+--------------+---------------------+-----------------+
 
 
 
 
 Care Team Providers
 
 
+-----------------------+------+-------------+
| Care Team Member Name | Role | Phone       |
+-----------------------+------+-------------+
 PCP  | Unavailable |
+-----------------------+------+-------------+
 
 
 
 Encounter Details
 
 
+--------+-----------+----------------------+----------------------+-------------+
| Date   | Type      | Department           | Care Team            | Description |
+--------+-----------+----------------------+----------------------+-------------+
| / | [facility]  |   [facility] |   Marzena Moser  |             |
|    | Encounter |  MED CTR XRAY  [address] | M, DO  [address]      |             |
|        |           |  [place]       | [address]      |             |
|        |           | [place] [address] | [place]      |             |
|        |           |   [phone]       | [address] [phone]  |             |
|        |           |                      |  [phone] (Fax)  |             |
+--------+-----------+----------------------+----------------------+-------------+
 
 
 
 Social History
 
 
+----------------+-------+-----------+--------+------+
| Tobacco Use    | Types | Packs/Day | Years  | Date |
|                |       |           | Used   |      |
+----------------+-------+-----------+--------+------+
| Never Assessed |       |           |        |      |
+----------------+-------+-----------+--------+------+
 
 
 
+------------------+---------------+
| Sex Assigned at  | Date Recorded |
| Birth            |               |
+------------------+---------------+
| Not on file      |               |
+------------------+---------------+
 
 
 
+----------------+-------------+-------------+
| Job Start Date | Occupation  | Industry    |
+----------------+-------------+-------------+
| Not on file    | Not on file | Not on file |
+----------------+-------------+-------------+
 
 
 
+----------------+--------------+------------+
| Travel History | Travel Start | Travel End |
+----------------+--------------+------------+
 
 
 
 
+-------------------------------------+
| No recent travel history available. |
+-------------------------------------+
 documented as of this encounter
 
 Plan of Treatment
 
 
+--------+-----------+------------+----------------------+-------------------+
| Date   | Type      | Specialty  | Care Team            | Description       |
+--------+-----------+------------+----------------------+-------------------+
| / | Office    | Cardiology |   Tonia Wilson,    |                   |
|    | Visit     |            | ARNJESSICA  [address]   |                   |
|        |           |            | St  [place], [place]  |                   |
|        |           |            | [address]  [phone]  |                   |
|        |           |            |  [phone] (Fax)  |                   |
+--------+-----------+------------+----------------------+-------------------+
| / | Hospital  | Radiology  |   Popeye Townsend, |                   |
|    | Encounter |            |  MD  [address] |                   |
|        |           |            |  [place]. [place],   |                   |
|        |           |            | [place] [address]             |                   |
|        |           |            | [phone]         |                   |
|        |           |            | [phone] (Fax)   |                   |
+--------+-----------+------------+----------------------+-------------------+
| / | Surgery   | Radiology  |   Popeye Townsend, | CV EP PPM SYSTEM  |
|    |           |            |  MD  [address] | IMPLANT           |
|        |           |            |  [place],   |                   |
|        |           |            | [place] [address]             |                   |
|        |           |            | [phone]         |                   |
|        |           |            | [phone] (Fax)   |                   |
+--------+-----------+------------+----------------------+-------------------+
| 02/10/ | Clinical  | Cardiology |                      |                   |
|    | Support   |            |                      |                   |
+--------+-----------+------------+----------------------+-------------------+
| / | Office    | Cardiology |   Tonia Wilson,    |                   |
|    | Visit     |            | BELKIS  [address]   |                   |
|        |           |            | [place]  |                   |
|        |           |            | [address]  [phone]  |                   |
|        |           |            |  [phone] (Fax)  |                   |
+--------+-----------+------------+----------------------+-------------------+
| / | Off-Site  | Nephrology |   Marzena Moser  |                   |
|    | Visit     |            | DO ETIENNE  [address] [place] [address]      |                   |
|        |           |            | [address]      |                   |
|        |           |            | [place]      |                   |
|        |           |            | [address] [phone]  |                   |
|        |           |            |  [phone] (Fax)  |                   |
+--------+-----------+------------+----------------------+-------------------+
 documented as of this encounter
 
 Visit Diagnoses
 Not on filedocumented in this encounter

## 2020-01-08 NOTE — XMS
Encounter Summary
  Created on: 2020
 
 Viviana Ricci
 External Reference #: [id number]
 : 47
 Sex: Female
 
 Demographics
 
 
+-----------------------+----------------------+
| Address               | [address]      |
|                       | [place]  [address] |
+-----------------------+----------------------+
| Home Phone            | [phone]      |
+-----------------------+----------------------+
| Preferred Language    | Unknown              |
+-----------------------+----------------------+
| Marital Status        | Single               |
+-----------------------+----------------------+
| Restorationist Affiliation | [id number]                 |
+-----------------------+----------------------+
| Race                  | Unknown              |
+-----------------------+----------------------+
| Ethnic Group          | Unknown              |
+-----------------------+----------------------+
 
 
 Author
 
 
+--------------+--------------------------------------------+
| Author       | [facility]  |
|              | and [place]                                |
+--------------+--------------------------------------------+
| Organization | [facility]  |
|              | and [place]                                |
+--------------+--------------------------------------------+
| Address      | Unknown                                    |
+--------------+--------------------------------------------+
| Phone        | Unavailable                                |
+--------------+--------------------------------------------+
 
 
 
 Support
 
 
+----------------+--------------+---------------------+-----------------+
| Name           | Relationship | Address             | Phone           |
+----------------+--------------+---------------------+-----------------+
| Ada/Ed Radhames | ECON         | [address]              | [phone] |
|                |              | [place]  |                 |
|                |              |  [id number]              |                 |
+----------------+--------------+---------------------+-----------------+
 
 
 
 
 Care Team Providers
 
 
+------------------------+------+-----------------+
| Care Team Member Name  | Role | Phone           |
+------------------------+------+-----------------+
| Marzena Moser DO | PCP  | [phone] |
+------------------------+------+-----------------+
 
 
 
 Reason for Visit
 Evaluate & Treat (Routine)
 
+--------+--------+------------+--------------+--------------+---------------+
| Status | Reason | Specialty  | Diagnoses /  | Referred By  | Referred To   |
|        |        |            | Procedures   | Contact      | Contact       |
+--------+--------+------------+--------------+--------------+---------------+
| Closed |        | Nephrology |   Diagnoses  |   Stroemel,  |   Stroemel,   |
|        |        |            |  Unspecified | Marzena M,   | Marzena M, DO |
|        |        |            |              | DO  [address] |   [address]    |
|        |        |            | complication |  [place]   |
|        |        |            |  of kidney   |  [address]  [place]  | [address]  [place]    |
|        |        |            | transplant   | [place]    | [place]     |
|        |        |            | FSGS (focal  | [address]        | [address]  Phone: |
|        |        |            | segmental    | Phone:       |  [phone] |
|        |        |            | glomeruloscl | [phone] |   Fax:        |
|        |        |            | erosis)      |   Fax:       | [phone]  |
|        |        |            | Hypertension | [phone] |               |
|        |        |            | , essential  |              |               |
|        |        |            |  Procedures  |              |               |
|        |        |            |  FL OFFICE   |              |               |
|        |        |            | OUTPATIENT   |              |               |
|        |        |            | VISIT 25     |              |               |
|        |        |            | MINUTES      |              |               |
+--------+--------+------------+--------------+--------------+---------------+
 
 
 
 
 Encounter Details
 
 
+--------+-----------+---------------------+----------------------+----------------------+
| Date   | Type      | Department          | Care Team            | Description          |
+--------+-----------+---------------------+----------------------+----------------------+
| 10/01/ | Off-Site  |   [facility]         |   Marzena Moser  | FSGS (focal          |
| 2018   | Visit     | NEPHROLOGY  [address]   | M, DO  [address]      | segmental            |
|        |           | [address]   | [place]      | glomerulosclerosis)  |
|        |           | [place], [place]     | [place]      | (Primary Dx); Kidney |
|        |           | [address]          | [address]  [phone]  |  replaced by         |
|        |           | [phone]        |  [phone] (Fax)  | transplant; Type 2   |
|        |           |                     |                      | DM with CKD stage 2  |
|        |           |                     |                      | and hypertension     |
|        |           |                     |                      | (HCC); Essential     |
|        |           |                     |                      | hypertension,        |
|        |           |                     |                      | malignant            |
+--------+-----------+---------------------+----------------------+----------------------+
 
 
 
 
 Social History
 
 
+--------------+-------+-----------+--------+------+
| Tobacco Use  | Types | Packs/Day | Years  | Date |
|              |       |           | Used   |      |
+--------------+-------+-----------+--------+------+
| Never Smoker |       |           |        |      |
+--------------+-------+-----------+--------+------+
 
 
 
+---------------------+---+---+---+
| Smokeless Tobacco:  |   |   |   |
| Never Used          |   |   |   |
+---------------------+---+---+---+
 
 
 
+---------------------------------------------------------------+
| Comments: some second hand smoke exposure, but fairly minimal |
+---------------------------------------------------------------+
 
 
 
+-------------+-------------+---------+----------+
| Alcohol Use | Drinks/Week | oz/Week | Comments |
+-------------+-------------+---------+----------+
| No          |             |         |          |
+-------------+-------------+---------+----------+
 
 
 
+------------------+---------------+
| Sex Assigned at  | Date Recorded |
| Birth            |               |
+------------------+---------------+
| Not on file      |               |
+------------------+---------------+
 
 
 
+----------------+-------------+-------------+
| Job Start Date | Occupation  | Industry    |
+----------------+-------------+-------------+
| Not on file    | Not on file | Not on file |
+----------------+-------------+-------------+
 
 
 
+----------------+--------------+------------+
| Travel History | Travel Start | Travel End |
+----------------+--------------+------------+
 
 
 
+-------------------------------------+
| No recent travel history available. |
 
+-------------------------------------+
 documented as of this encounter
 
 Last Filed Vital Signs
 
 
+-------------------+---------------------+----------------------+----------+
| Vital Sign        | Reading             | Time Taken           | Comments |
+-------------------+---------------------+----------------------+----------+
| Blood Pressure    | 150/86              | 10/01/2018  2:28 PM  |          |
|                   |                     | PDT                  |          |
+-------------------+---------------------+----------------------+----------+
| Pulse             | -                   | -                    |          |
+-------------------+---------------------+----------------------+----------+
| Temperature       | 35.9   C (96.6   F) | 10/01/2018  2:28 PM  |          |
|                   |                     | PDT                  |          |
+-------------------+---------------------+----------------------+----------+
| Respiratory Rate  | -                   | -                    |          |
+-------------------+---------------------+----------------------+----------+
| Oxygen Saturation | -                   | -                    |          |
+-------------------+---------------------+----------------------+----------+
| Inhaled Oxygen    | -                   | -                    |          |
| Concentration     |                     |                      |          |
+-------------------+---------------------+----------------------+----------+
| Weight            | 122.5 kg (270 lb)   | 10/01/2018  2:28 PM  |          |
|                   |                     | PDT                  |          |
+-------------------+---------------------+----------------------+----------+
| Height            | -                   | -                    |          |
+-------------------+---------------------+----------------------+----------+
| Body Mass Index   | 43.58               | 2018  2:31 PM  |          |
|                   |                     | PDT                  |          |
+-------------------+---------------------+----------------------+----------+
 documented in this encounter
 
 Progress Marzena Tamayo DO - 10/01/2018  2:00 PM PDTFormatting of this note might be different
 from the original.
 Subjective:   NEPHROLOGY
  
  
 Patient ID: Viviana Ricci is a 72 y.o. female.
 
 HPI:
 Followup for this 72 YOWF  s/p a renal allograft, 05, at [facility] with remote allograft dy
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
idemia, SHPTH,  previous low back pain, morbid obesity/JACK, chronic pulmonary  hypertension,
 historically related to centripetal obesity, and  CAD, s/p CABG x3 vessels,. 
 
 She has historically been on PPI's for some time.  She states that she is frustrated with h
er weight, and eating, and refuses to weigh, but gives me her home weight.
 She denies SOB , edema, graft tenderness, or dysuria.  She states that had moderate edema l
ast month, and was diuresed with lasix, PO, by her Dr. Andres Avina, her Pulmonologist.
 
  
 
 MEDS:
 
 Prograf 1.5 mg, AM and 1 mg, PM.
 Mycophenolate 250 mg, BID.
 Prednisone 5 mg, daily.
 
 Outpatient Prescriptions Marked as Taking for the 10/1/18 encounter (Off-Site Visit) with ETIENNE Moser, DO 
 Medication Sig Dispense Refill 
   allopurinol (ZYLOPRIM) 100 mg tablet take 1 tablet by mouth once daily 30 tablet 11 
   aspirin 81 mg EC tablet Take 81 mg by mouth Daily.   
   B-D INS SYRINGE 0.5CC/31GX5/16 31G X 5/16" 0.5 ML MISC USE BEFORE MEALS AS DIRECTED 1 e
ach 11 
   cholecalciferol (VITAMIN D-3) 2000 UNITS TABS Take 5,000 Units by mouth Every other day
.   
   fludrocortisone (FLORINEF) 0.1 mg tablet take 1 tablet by mouth every other day 90 tabl
et 4 
   furosemide (LASIX) 40 mg tablet Take 1 tablet by mouth Daily as needed. 30 tablet 11 
   glucose blood VI test strips (ONE TOUCH ULTRA TEST) strip Check blood sugar before each
 meal and as directed 100 each 12 
   insulin lispro (HUMALOG) 100 units/mL injection (vial) inject subcutaneously BEFORE CHIKA
LS ACCORDING TO SLIDING SCALE Max dose 20 units daily 10 vial 11 
   LANTUS 100 UNIT/ML injection (vial) inject 20 units subcutaneously every morning 10 via
l 11 
   levothyroxine (SYNTHROID) 50 mcg tablet take 1 tablet by mouth once daily 30 tablet 11 
   lisinopril (PRINIVIL,ZESTRIL) 30 MG tablet take 1 tablet by mouth once daily 90 tablet 
3 
   loperamide (ANTI-DIARRHEAL) 2 mg capsule Take 1 capsule by mouth 4 times daily as neede
d. 60 capsule 5 
   losartan (COZAAR) 50 mg tablet take 1 tablet by mouth once daily 90 tablet 3 
   magnesium oxide (MAG-OX) 400 mg tablet take 1 tablet by mouth twice a day 60 tablet 11 
   metoprolol tartrate (LOPRESSOR) 25 mg tablet take 1 tablet by mouth twice a day 60 tabl
et 11 
   omeprazole (PRILOSEC) 20 mg capsule Take 20 mg by mouth every morning (before breakfast
).   
   Prenatal Vit-Fe Fumarate-FA (PNV PRENATAL PLUS MULTIVITAMIN) 27-1 MG TABS take 1 tablet
 by mouth once daily. 30 tablet 11 
   Respiratory Therapy Supplies MISC Continue nocturnal O2 at 2 l/m through CPAP for lifet
bart. Dx: JACK G47.33 Please provide new nasal mask for CPAP and any other needed replacement 
supplies. 1 each 0 
   Respiratory Therapy Supplies MISC Decrease CPAP to 12-18 cmH2O Diagnosis Code(s)327.23.
 Please send order to [facility]. 1 each 0 
   rosuvastatin (CRESTOR) 20 mg tablet take 1 tablet by mouth NIGHTLY 30 tablet 11 
   SENSIPAR 30 MG tablet take 1 tablet by mouth once daily 30 tablet 11 
 
 Review of Systems
 
 Objective:    /86  | Temp 35.9 C (96.6 F)  | Wt 122.5 kg (270 lb)  | BMI 43.58 kg
/m 
  
 
 Physical Exam 
 HEENT: No thrush.
 Heart:  Regular rate and rhythm with no S3, S4, murmur or rub.
 Lungs:  CTA bilaterally.  No rales or wheezes.
 Abdomen:  soft, obese, the renal allograft in the RLQ is nontender,  NABS.
 Extremities: 1+ edema,  clubbing, cyanosis, no foot ulcers.
 (+) There is a 6 x 8 cm area of ecchymosis over Right wrist, not tender. Full ROM at Right 
wrist.
 
 Lab Results 
 Component Value Date 
  NAEX 146 2018 
  KEX 4.5 2018 
  CLEX 109 2018 
  CO2EX 21 2018 
 
  BUNEX 45 2018 
  CREEX 1.35 2018 
  EGFREX 39 2018 
  GLUEX 98 2018 
  CAEX 10.4 2018 
  PHOSEX 3.1 2018 
  PTHEX 193.0 (A) 2016 
  MML0OGR 7.1 2018 
 
 Lab Results 
 Component Value Date 
  WBCEX 6.1 2018 
  HGBEX 13.9 2018 
  HCTEX 41.8 2018 
  PLTEX 143 2018 
 
 Lab Results 
 Component Value Date 
  PROTEX 1.3 (A) 2018 
  
 
 Lab Results 
 Component Value Date 
  UAEX negative 2018 
  UAEX normal 2018 
  UAEX negative 2018 
  UAEX 5 2018 
  UAEX normal 2018 
  UAEX 2 2018 
  UAEX 1.012 2018 
  UAEX trace 2018 
 
 Assessment: 
 
 1.  Renal Allograft, 05 -- her tacro. level is trending upward.  The Scr is creeping u
pward.
 2.  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
ft.
 3.  Type 2 DM, requiring insuline--  excellent control.
 4.  Hyperlipidemia--on statin Rx
 5.  Hypertension-- fairly good control.
 6.  SHPTH-- stable.
 7.  Morbid Obesity/JACK/Pulmonary hypertension-- she does not appear motivated at lifestyle 
modification?
 8.  CAD, s/p CABG  3 vessels, --stable on ASA, metoprolol, atorvastatin.
 9.  Type IV RTA--stable.
 10. Chronic Low Back pain--in remission.
 11.   chronic DJD, left knee--  same. 
 
 Plan: 
 
 1.  I asked Brandy to repeat her trough Tacro. level in the next 3 days. If still > 7.0 ng/ml
, will adjust the dose.
 2.  I encouraged her that she does appear to have regained good diabetic control.
 3.  I reviewed with her some measures about food choices and portion control, to assist wit
h her obesity. My gut feeling is that she struggles with chronic
 endogenous depression, which contributes to her eating disorder?
 4.  Lastly, she has no GI symptoms, therefore, she agreed to DC her PPI , omeprazole , to d
ecrease her risk of C. diff.
 5.  Will review her next tacro. level when available.  Additionally, I advised her continue
 
 lab draws Q 3 mo. after that.  Will plan to see her back in 6 months at the CKD Clinic at [facility], [place], [place].
 
 Electronically signed by: Marzena Moser DO on 10/1/2018 at 18:40
 
 CC:    Jose David Dalal M.D., [facility], [facility]
            Edgar Sanders MD, [facility], Orthopedics
            Andres TAMAYO
 
 Electronically signed by Marzena Moser DO at 10/01/2018  7:03 PM PDTdocumented in thi
s encounter
 
 Plan of Treatment
 
 
+--------+-----------+------------+----------------------+-------------------+
| Date   | Type      | Specialty  | Care Team            | Description       |
+--------+-----------+------------+----------------------+-------------------+
| / | Office    | Cardiology |   Tonia Wilson,    |                   |
|    | Visit     |            | ARNP  [address]   |                   |
|        |           |            | [address] [place]  |                   |
|        |           |            | [id number] [phone]  |                   |
|        |           |            |  [phone] (Fax)  |                   |
+--------+-----------+------------+----------------------+-------------------+
| / | Hospital  | Radiology  |   Popeye Townsend, |                   |
|    | Encounter |            |  MD  [address] [place] |                   |
|        |           |            |  [place]   |                   |
|        |           |            | [place] [address]             |                   |
|        |           |            | [phone]         |                   |
|        |           |            | [phone] (Fax)   |                   |
+--------+-----------+------------+----------------------+-------------------+
| / | Surgery   | Radiology  |   Popeye Townsend, | CV EP PPM SYSTEM  |
|    |           |            |  MD  [address] | IMPLANT           |
|        |           |            |  [place],   |                   |
|        |           |            | [place] [address]             |                   |
|        |           |            | [phone]         |                   |
|        |           |            | [phone] (Fax)   |                   |
+--------+-----------+------------+----------------------+-------------------+
| 02/10/ | Clinical  | Cardiology |                      |                   |
|    | Support   |            |                      |                   |
+--------+-----------+------------+----------------------+-------------------+
| / | Office    | Cardiology |   Tonia Wilson,    |                   |
|    | Visit     |            | BELKIS  [address]   |                   |
|        |           |            | [facility] [place]  |                   |
|        |           |            | [address]  [phone]  |                   |
|        |           |            |  [phone] (Fax)  |                   |
+--------+-----------+------------+----------------------+-------------------+
| / | Off-Site  | Nephrology |   Marzena Moser  |                   |
2020   | Visit     |            | DO ETIENNE  [address]      |                   |
|        |           |            | [address]      |                   |
|        |           |            | [place]      |                   |
|        |           |            | [address]  [phone]  |                   |
|        |           |            |  [phone] (Fax)  |                   |
+--------+-----------+------------+----------------------+-------------------+
 documented as of this encounter
 
 Visit Diagnoses
 
 
 
+-----------------------------------------------------------------------------------------+
| Diagnosis                                                                               |
+-----------------------------------------------------------------------------------------+
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
| lesion of membranous glomerulonephritis                                                 |
+-----------------------------------------------------------------------------------------+
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
+-----------------------------------------------------------------------------------------+
|   Type 2 DM with CKD stage 2 and hypertension (HCC)                                     |
+-----------------------------------------------------------------------------------------+
|   Essential hypertension, malignant                                                     |
+-----------------------------------------------------------------------------------------+
 documented in this encounter

## 2020-01-08 NOTE — XMS
Encounter Summary
  Created on: 2020
 
 Viviana Ricci
 External Reference #: [id number]
 : 47
 Sex: Female
 
 Demographics
 
 
+-----------------------+----------------------+
| Address               | [address]      |
|                       | [place]  [address] |
+-----------------------+----------------------+
| Home Phone            | [phone]      |
+-----------------------+----------------------+
| Preferred Language    | Unknown              |
+-----------------------+----------------------+
| Marital Status        | Single               |
+-----------------------+----------------------+
| Orthodoxy Affiliation | [id number]                 |
+-----------------------+----------------------+
| Race                  | Unknown              |
+-----------------------+----------------------+
| Ethnic Group          | Unknown              |
+-----------------------+----------------------+
 
 
 Author
 
 
+--------------+--------------------------------------------+
| Author       | [facility]  |
|              | and [place]                                |
+--------------+--------------------------------------------+
| Organization | [facility]  |
|              | and [place]                                |
+--------------+--------------------------------------------+
| Address      | Unknown                                    |
+--------------+--------------------------------------------+
| Phone        | Unavailable                                |
+--------------+--------------------------------------------+
 
 
 
 Support
 
 
+----------------+--------------+---------------------+-----------------+
| Name           | Relationship | Address             | Phone           |
+----------------+--------------+---------------------+-----------------+
| Ada/Ed Radhames | ECON         | [address]              | [phone] |
|                |              | [place]  |                 |
|                |              |  [address]              |                 |
+----------------+--------------+---------------------+-----------------+
 
 
 
 
 Care Team Providers
 
 
+-----------------------+------+-------------+
| Care Team Member Name | Role | Phone       |
+-----------------------+------+-------------+
 PCP  | Unavailable |
+-----------------------+------+-------------+
 
 
 
 Reason for Visit
 
 
+--------+----------+
| Reason | Comments |
+--------+----------+
| Other  |          |
+--------+----------+
 
 
 
 Encounter Details
 
 
+--------+-----------+----------------------+----------------------+-------------+
| Date   | Type      | Department           | Care Team            | Description |
+--------+-----------+----------------------+----------------------+-------------+
| / | Telephone |   [facility] [place]          |   Maricruz Flanagan, | Other       |
|    |           | PULMONARY  [address]     |  RN                  |             |
|        |           | [address]  [place], |                      |             |
|        |           |  [place] [address]       |                      |             |
|        |           | [phone]         |                      |             |
+--------+-----------+----------------------+----------------------+-------------+
 
 
 
 Social History
 
 
+--------------+-------+-----------+--------+------+
| Tobacco Use  | Types | Packs/Day | Years  | Date |
|              |       |           | Used   |      |
+--------------+-------+-----------+--------+------+
| Never Smoker |       |           |        |      |
+--------------+-------+-----------+--------+------+
 
 
 
+---------------------+---+---+---+
| Smokeless Tobacco:  |   |   |   |
| Never Used          |   |   |   |
+---------------------+---+---+---+
 
 
 
+---------------------------------------------------------------+
| Comments: some second hand smoke exposure, but fairly minimal |
+---------------------------------------------------------------+
 
 
 
 
+-------------+-------------+---------+----------+
| Alcohol Use | Drinks/Week | oz/Week | Comments |
+-------------+-------------+---------+----------+
| No          |             |         |          |
+-------------+-------------+---------+----------+
 
 
 
+------------------+---------------+
| Sex Assigned at  | Date Recorded |
| Birth            |               |
+------------------+---------------+
| Not on file      |               |
+------------------+---------------+
 
 
 
+----------------+-------------+-------------+
| Job Start Date | Occupation  | Industry    |
+----------------+-------------+-------------+
| Not on file    | Not on file | Not on file |
+----------------+-------------+-------------+
 
 
 
+----------------+--------------+------------+
| Travel History | Travel Start | Travel End |
+----------------+--------------+------------+
 
 
 
+-------------------------------------+
| No recent travel history available. |
+-------------------------------------+
 documented as of this encounter
 
 Plan of Treatment
 
 
+--------+-----------+------------+----------------------+-------------------+
| Date   | Type      | Specialty  | Care Team            | Description       |
+--------+-----------+------------+----------------------+-------------------+
| / | Office    | Cardiology |   Tonia Wilson,    |                   |
|    | Visit     |            | ARNP  [address]   |                   |
|        |           |            | St  [place] [facility], [place]  |                   |
|        |           |            | [address]  [phone]  |                   |
|        |           |            |  [phone] (Fax)  |                   |
+--------+-----------+------------+----------------------+-------------------+
| / | Hospital  | Radiology  |   Popeye Townsend, |                   |
|    | Encounter |            |  MD  [address] |                   |
|        |           |            |  St.  [place],   |                   |
|        |           |            | [place] [address]             |                   |
|        |           |            | [phone]         |                   |
|        |           |            | [phone] (Fax)   |                   |
+--------+-----------+------------+----------------------+-------------------+
| / | Surgery   | Radiology  |   Popeye Townsend, | CV EP PPM SYSTEM  |
|    |           |            |  MD  [address] | IMPLANT           |
 
|        |           |            |  St.  [place],   |                   |
|        |           |            | [place] [address]             |                   |
|        |           |            | [phone]         |                   |
|        |           |            | [phone] (Fax)   |                   |
+--------+-----------+------------+----------------------+-------------------+
| 02/10/ | Clinical  | Cardiology |                      |                   |
|    | Support   |            |                      |                   |
+--------+-----------+------------+----------------------+-------------------+
| / | Office    | Cardiology |   Hellberg, Tonia,    |                   |
|    | Visit     |            | [facility]  [address]   |                   |
|        |           |            | [place]  |                   |
|        |           |            | [address]  [phone]  |                   |
|        |           |            |  [phone] (Fax)  |                   |
+--------+-----------+------------+----------------------+-------------------+
| / | Off-Site  | Nephrology |   Marzena Moser  |                   |
|    | Visit     |            | DO ETIENNE  [address] [place] [address]      |                   |
|        |           |            | [address]      |                   |
|        |           |            | [place]      |                   |
|        |           |            | [address] [phone]  |                   |
|        |           |            |  [phone] (Fax)  |                   |
+--------+-----------+------------+----------------------+-------------------+
 documented as of this encounter
 
 Visit Diagnoses
 Not on filedocumented in this encounter

## 2020-01-08 NOTE — XMS
Encounter Summary
  Created on: 2020
 
 Viviana Ricci
 External Reference #: [id number]
 : 47
 Sex: Female
 
 Demographics
 
 
+-----------------------+----------------------+
| Address               | [address]      |
|                       | [place]  [address] |
+-----------------------+----------------------+
| Home Phone            | [phone]      |
+-----------------------+----------------------+
| Preferred Language    | Unknown              |
+-----------------------+----------------------+
| Marital Status        | Single               |
+-----------------------+----------------------+
| Hoahaoism Affiliation | 1009                 |
+-----------------------+----------------------+
| Race                  | Unknown              |
+-----------------------+----------------------+
| Ethnic Group          | Unknown              |
+-----------------------+----------------------+
 
 
 Author
 
 
+--------------+--------------------------------------------+
| Author       | [facility]  |
|              | and [place]                                |
+--------------+--------------------------------------------+
| Organization | [facility]  |
|              | and [place]                                |
+--------------+--------------------------------------------+
| Address      | Unknown                                    |
+--------------+--------------------------------------------+
| Phone        | Unavailable                                |
+--------------+--------------------------------------------+
 
 
 
 Support
 
 
+----------------+--------------+---------------------+-----------------+
| Name           | Relationship | Address             | Phone           |
+----------------+--------------+---------------------+-----------------+
| Ada/Ed Radhames | ECON         | [address]              | [phone] |
|                |              | [place]  |                 |
|                |              |  [address]              |                 |
+----------------+--------------+---------------------+-----------------+
 
 
 
 
 Care Team Providers
 
 
+-----------------------+------+-------------+
| Care Team Member Name | Role | Phone       |
+-----------------------+------+-------------+
 PCP  | Unavailable |
+-----------------------+------+-------------+
 
 
 
 Reason for Visit
 
 
+--------+----------+
| Reason | Comments |
+--------+----------+
| Other  |          |
+--------+----------+
 
 
 
 Encounter Details
 
 
+--------+-----------+----------------------+----------------------+-------------+
| Date   | Type      | Department           | Care Team            | Description |
+--------+-----------+----------------------+----------------------+-------------+
| / | Telephone |   [facility] [place]          |   Maricruz Flanagan, | Other       |
|    |           | PULMONARY  [address]     |  RN                  |             |
|        |           | [address]  [place], |                      |             |
|        |           |  [place] [address]       |                      |             |
|        |           | [phone]         |                      |             |
+--------+-----------+----------------------+----------------------+-------------+
 
 
 
 Social History
 
 
+--------------+-------+-----------+--------+------+
| Tobacco Use  | Types | Packs/Day | Years  | Date |
|              |       |           | Used   |      |
+--------------+-------+-----------+--------+------+
| Never Smoker |       |           |        |      |
+--------------+-------+-----------+--------+------+
 
 
 
+---------------------+---+---+---+
| Smokeless Tobacco:  |   |   |   |
| Never Used          |   |   |   |
+---------------------+---+---+---+
 
 
 
+---------------------------------------------------------------+
| Comments: some second hand smoke exposure, but fairly minimal |
+---------------------------------------------------------------+
 
 
 
 
+-------------+-------------+---------+----------+
| Alcohol Use | Drinks/Week | oz/Week | Comments |
+-------------+-------------+---------+----------+
| No          |             |         |          |
+-------------+-------------+---------+----------+
 
 
 
+------------------+---------------+
| Sex Assigned at  | Date Recorded |
| Birth            |               |
+------------------+---------------+
| Not on file      |               |
+------------------+---------------+
 
 
 
+----------------+-------------+-------------+
| Job Start Date | Occupation  | Industry    |
+----------------+-------------+-------------+
| Not on file    | Not on file | Not on file |
+----------------+-------------+-------------+
 
 
 
+----------------+--------------+------------+
| Travel History | Travel Start | Travel End |
+----------------+--------------+------------+
 
 
 
+-------------------------------------+
| No recent travel history available. |
+-------------------------------------+
 documented as of this encounter
 
 Plan of Treatment
 
 
+--------+-----------+------------+----------------------+-------------------+
| Date   | Type      | Specialty  | Care Team            | Description       |
+--------+-----------+------------+----------------------+-------------------+
| / | Office    | Cardiology |   Tonia Wilson,    |                   |
|    | Visit     |            | ARNP  [address]   |                   |
|        |           |            | St  [place] [facility], [place]  |                   |
|        |           |            | [address]  [phone]  |                   |
|        |           |            |  [phone] (Fax)  |                   |
+--------+-----------+------------+----------------------+-------------------+
| / | Hospital  | Radiology  |   Popeye Townsend, |                   |
|    | Encounter |            |  MD  [address] |                   |
|        |           |            |  St.  [place],   |                   |
|        |           |            | [place] [address]             |                   |
|        |           |            | [phone]         |                   |
|        |           |            | [phone] (Fax)   |                   |
+--------+-----------+------------+----------------------+-------------------+
| / | Surgery   | Radiology  |   Popeye Townsend, | CV EP PPM SYSTEM  |
|    |           |            |  MD  [address] | IMPLANT           |
 
|        |           |            |  St.  [place],   |                   |
|        |           |            | [place] [address]             |                   |
|        |           |            | [phone]         |                   |
|        |           |            | [phone] (Fax)   |                   |
+--------+-----------+------------+----------------------+-------------------+
| 02/10/ | Clinical  | Cardiology |                      |                   |
|    | Support   |            |                      |                   |
+--------+-----------+------------+----------------------+-------------------+
| / | Office    | Cardiology |   Hellberg, Tonia,    |                   |
|    | Visit     |            | [facility]  [address]   |                   |
|        |           |            | [place]  |                   |
|        |           |            | [address]  [phone]  |                   |
|        |           |            |  [phone] (Fax)  |                   |
+--------+-----------+------------+----------------------+-------------------+
| / | Off-Site  | Nephrology |   Marzena Moser  |                   |
|    | Visit     |            | DO ETIENNE  [address] [place] [address]      |                   |
|        |           |            | [address]      |                   |
|        |           |            | [place]      |                   |
|        |           |            | [address] [phone]  |                   |
|        |           |            |  [phone] (Fax)  |                   |
+--------+-----------+------------+----------------------+-------------------+
 documented as of this encounter
 
 Visit Diagnoses
 Not on filedocumented in this encounter

## 2020-01-08 NOTE — XMS
Encounter Summary
  Created on: 2020
 
 Viviana Ricci
 External Reference #: [id number]
 : 47
 Sex: Female
 
 Demographics
 
 
+-----------------------+----------------------+
| Address               | [address]      |
|                       | [place]  [address] |
+-----------------------+----------------------+
| Home Phone            | [phone]      |
+-----------------------+----------------------+
| Preferred Language    | Unknown              |
+-----------------------+----------------------+
| Marital Status        | Single               |
+-----------------------+----------------------+
| Mandaeism Affiliation | [id number]                 |
+-----------------------+----------------------+
| Race                  | Unknown              |
+-----------------------+----------------------+
| Ethnic Group          | Unknown              |
+-----------------------+----------------------+
 
 
 Author
 
 
+--------------+--------------------------------------------+
| Author       | [facility]  |
|              | and [facility]                                |
+--------------+--------------------------------------------+
| Organization | [facility]  |
|              | and [facility]                                |
+--------------+--------------------------------------------+
| Address      | Unknown                                    |
+--------------+--------------------------------------------+
| Phone        | Unavailable                                |
+--------------+--------------------------------------------+
 
 
 
 Support
 
 
+----------------+--------------+---------------------+-----------------+
| Name           | Relationship | Address             | Phone           |
+----------------+--------------+---------------------+-----------------+
| Ada/Ed Radhames | ECON         | [address]              | [phone] |
|                |              | [place]  |                 |
|                |              |  [address]              |                 |
+----------------+--------------+---------------------+-----------------+
 
 
 
 
 Care Team Providers
 
 
+-----------------------+------+-------------+
| Care Team Member Name | Role | Phone       |
+-----------------------+------+-------------+
 PCP  | Unavailable |
+-----------------------+------+-------------+
 
 
 
 Encounter Details
 
 
+--------+-----------+----------------------+----------------------+-------------+
| Date   | Type      | Department           | Care Team            | Description |
+--------+-----------+----------------------+----------------------+-------------+
| 08/15/ | [facility]  |   [facility] |   Marzena Moser  |             |
|  - | Encounter |  [facility] MED ONC     | M, DO  [address]      |             |
|        |           | [address]  | [place]      |             |
| / |           | [place] [address] | [place]      |             |
|    |           |   [phone]       | [address]  [phone]  |             |
|        |           |                      |  [phone] (Fax)  |             |
+--------+-----------+----------------------+----------------------+-------------+
 
 
 
 Social History
 
 
+----------------+-------+-----------+--------+------+
| Tobacco Use    | Types | Packs/Day | Years  | Date |
|                |       |           | Used   |      |
+----------------+-------+-----------+--------+------+
| Never Assessed |       |           |        |      |
+----------------+-------+-----------+--------+------+
 
 
 
+------------------+---------------+
| Sex Assigned at  | Date Recorded |
| Birth            |               |
+------------------+---------------+
| Not on file      |               |
+------------------+---------------+
 
 
 
+----------------+-------------+-------------+
| Job Start Date | Occupation  | Industry    |
+----------------+-------------+-------------+
| Not on file    | Not on file | Not on file |
+----------------+-------------+-------------+
 
 
 
+----------------+--------------+------------+
| Travel History | Travel Start | Travel End |
+----------------+--------------+------------+
 
 
 
 
+-------------------------------------+
| No recent travel history available. |
+-------------------------------------+
 documented as of this encounter
 
 Plan of Treatment
 
 
+--------+-----------+------------+----------------------+-------------------+
| Date   | Type      | Specialty  | Care Team            | Description       |
+--------+-----------+------------+----------------------+-------------------+
| / | Office    | Cardiology |   Tonia Wilson,    |                   |
|    | Visit     |            | ARNJESSICA  [address]   |                   |
|        |           |            | St  [place], [place]  |                   |
|        |           |            | [address]  [phone]  |                   |
|        |           |            |  [phone] (Fax)  |                   |
+--------+-----------+------------+----------------------+-------------------+
| / | Hospital  | Radiology  |   Popeye Townsend, |                   |
|    | Encounter |            |  MD  [address] |                   |
|        |           |            |  St.  [place],   |                   |
|        |           |            | [place] [address]             |                   |
|        |           |            | [phone]         |                   |
|        |           |            | [phone] (Fax)   |                   |
+--------+-----------+------------+----------------------+-------------------+
| / | Surgery   | Radiology  |   Popeye Townsend, | CV EP PPM SYSTEM  |
|    |           |            |  MD  [address] | IMPLANT           |
|        |           |            |  St.  [place],   |                   |
|        |           |            | [place] [address]             |                   |
|        |           |            | [phone]         |                   |
|        |           |            | [phone] (Fax)   |                   |
+--------+-----------+------------+----------------------+-------------------+
| 02/10/ | Clinical  | Cardiology |                      |                   |
|    | Support   |            |                      |                   |
+--------+-----------+------------+----------------------+-------------------+
| / | Office    | Cardiology |   Tonia Wilson,    |                   |
|    | Visit     |            | ARNP  [address]   |                   |
|        |           |            | [place]  |                   |
|        |           |            | [address]  [phone]  |                   |
|        |           |            |  [phone] (Fax)  |                   |
+--------+-----------+------------+----------------------+-------------------+
| / | Off-Site  | Nephrology |   Marzena Moser  |                   |
|    | Visit     |            | DO ETIENNE  [address] [place] [address]      |                   |
|        |           |            | [address]      |                   |
|        |           |            | [place]      |                   |
|        |           |            | [address] [phone]  |                   |
|        |           |            |  [phone] (Fax)  |                   |
+--------+-----------+------------+----------------------+-------------------+
 documented as of this encounter
 
 Visit Diagnoses
 Not on filedocumented in this encounter

## 2020-01-08 NOTE — XMS
Encounter Summary
  Created on: 2020
 
 Viviana Ricci
 External Reference #: [id number]
 : 47
 Sex: Female
 
 Demographics
 
 
+-----------------------+----------------------+
| Address               | [address]      |
|                       | [place]  [address] |
+-----------------------+----------------------+
| Home Phone            | [phone]      |
+-----------------------+----------------------+
| Preferred Language    | Unknown              |
+-----------------------+----------------------+
| Marital Status        | Single               |
+-----------------------+----------------------+
| Jain Affiliation | [id number]                 |
+-----------------------+----------------------+
| Race                  | Unknown              |
+-----------------------+----------------------+
| Ethnic Group          | Unknown              |
+-----------------------+----------------------+
 
 
 Author
 
 
+--------------+--------------------------------------------+
| Author       | [facility]  |
|              | and [place]                                |
+--------------+--------------------------------------------+
| Organization | [facility]  |
|              | and [place]                                |
+--------------+--------------------------------------------+
| Address      | Unknown                                    |
+--------------+--------------------------------------------+
| Phone        | Unavailable                                |
+--------------+--------------------------------------------+
 
 
 
 Support
 
 
+----------------+--------------+---------------------+-----------------+
| Name           | Relationship | Address             | Phone           |
+----------------+--------------+---------------------+-----------------+
| Ada/Ed Radhames | ECON         | [address]              | [phone] |
|                |              | [place]  |                 |
|                |              |  [id number]              |                 |
+----------------+--------------+---------------------+-----------------+
 
 
 
 
 Care Team Providers
 
 
+------------------------+------+-----------------+
| Care Team Member Name  | Role | Phone           |
+------------------------+------+-----------------+
| Marzena Moser DO | PCP  | [phone] |
+------------------------+------+-----------------+
 
 
 
 Reason for Visit
 Evaluate & Treat (Routine)
 
+------------+--------+------------+--------------+--------------+---------------+
| Status     | Reason | Specialty  | Diagnoses /  | Referred By  | Referred To   |
|            |        |            | Procedures   | Contact      | Contact       |
+------------+--------+------------+--------------+--------------+---------------+
| Authorized |        | Nephrology |   Diagnoses  |   Shanti  |   Stroemel,   |
|            |        |            |  Unspecified | Marzena M,   | Marzena M, DO |
|            |        |            |              | DO  [address] |   [address]    |
|            |        |            | complication |  [address]   |
|            |        |            |  of kidney   |  [address]  [place]  | [address]  [place]    |
|            |        |            | transplant   | [place]    | [place]     |
|            |        |            | FSGS (focal  | [address]        | [address]  Phone: |
|            |        |            | segmental    | Phone:       |  [phone] |
|            |        |            | glomeruloscl | [phone] |   Fax:        |
|            |        |            | erosis)      |   Fax:       | [phone]  |
|            |        |            | Hypertension | [phone] |               |
|            |        |            | , essential  |              |               |
|            |        |            |  Procedures  |              |               |
|            |        |            |  FL OFFICE   |              |               |
|            |        |            | OUTPATIENT   |              |               |
|            |        |            | VISIT 25     |              |               |
|            |        |            | MINUTES      |              |               |
+------------+--------+------------+--------------+--------------+---------------+
 
 
 
 
 Encounter Details
 
 
+--------+---------+---------------------+----------------------+----------------------+
| Date   | Type    | Department          | Care Team            | Description          |
+--------+---------+---------------------+----------------------+----------------------+
| 12/23/ | Office  |   [facility]         |   Marzena Moser  | Kidney replaced by   |
| 2019   | Visit   | NEPHROLOGY  [address]   | M, DO  [address]      | transplant (Primary  |
|        |         | [address]   | [address]      | Dx); Hypertension,   |
|        |         | [place], [place]     | [place]      | essential;           |
|        |         | [address]          | [address]  [phone]  | Persistent atrial    |
|        |         | [phone]        |  [phone] (Fax)  | fibrillation; Type 2 |
|        |         |                     |                      |  DM with CKD stage 2 |
|        |         |                     |                      |  and hypertension    |
|        |         |                     |                      | ([facility])                |
+--------+---------+---------------------+----------------------+----------------------+
 
 
 
 
 Social History
 
 
+--------------+-------+-----------+--------+------+
| Tobacco Use  | Types | Packs/Day | Years  | Date |
|              |       |           | Used   |      |
+--------------+-------+-----------+--------+------+
| Never Smoker |       |           |        |      |
+--------------+-------+-----------+--------+------+
 
 
 
+---------------------+---+---+---+
| Smokeless Tobacco:  |   |   |   |
| Never Used          |   |   |   |
+---------------------+---+---+---+
 
 
 
+---------------------------------------------------------------+
| Comments: some second hand smoke exposure, but fairly minimal |
+---------------------------------------------------------------+
 
 
 
+-------------+-------------+---------+----------+
| Alcohol Use | Drinks/Week | oz/Week | Comments |
+-------------+-------------+---------+----------+
| No          |             |         |          |
+-------------+-------------+---------+----------+
 
 
 
+------------------+---------------+
| Sex Assigned at  | Date Recorded |
| Birth            |               |
+------------------+---------------+
| Not on file      |               |
+------------------+---------------+
 
 
 
+----------------+-------------+-------------+
| Job Start Date | Occupation  | Industry    |
+----------------+-------------+-------------+
| Not on file    | Not on file | Not on file |
+----------------+-------------+-------------+
 
 
 
+----------------+--------------+------------+
| Travel History | Travel Start | Travel End |
+----------------+--------------+------------+
 
 
 
+-------------------------------------+
| No recent travel history available. |
+-------------------------------------+
 documented as of this encounter
 
 
 Last Filed Vital Signs
 
 
+-------------------+-------------------+----------------------+----------+
| Vital Sign        | Reading           | Time Taken           | Comments |
+-------------------+-------------------+----------------------+----------+
| Blood Pressure    | 150/80            | 2019  3:00 PM  |          |
|                   |                   | PST                  |          |
+-------------------+-------------------+----------------------+----------+
| Pulse             | 68                | 2019  3:00 PM  | apical   |
|                   |                   | PST                  |          |
+-------------------+-------------------+----------------------+----------+
| Temperature       | 35.6   C (96   F) | 2019  3:00 PM  |          |
|                   |                   | PST                  |          |
+-------------------+-------------------+----------------------+----------+
| Respiratory Rate  | -                 | -                    |          |
+-------------------+-------------------+----------------------+----------+
| Oxygen Saturation | -                 | -                    |          |
+-------------------+-------------------+----------------------+----------+
| Inhaled Oxygen    | -                 | -                    |          |
| Concentration     |                   |                      |          |
+-------------------+-------------------+----------------------+----------+
| Weight            | 115.7 kg (255 lb) | 2019  3:00 PM  |          |
|                   |                   | PST                  |          |
+-------------------+-------------------+----------------------+----------+
| Height            | -                 | -                    |          |
+-------------------+-------------------+----------------------+----------+
| Body Mass Index   | 41.16             | 2019 11:44 AM  |          |
|                   |                   | PST                  |          |
+-------------------+-------------------+----------------------+----------+
 documented in this encounter
 
 Progress Notes
 Marzena Moser DO - 2019  2:00 PM PSTFormatting of this note might be different
 from the original.
 Subjective: NEPHROLOGY  
  
 Patient ID: Viviana Ricci is a 74 y.o. female.
 
 HPI Comments: 
  73 YOWF   who is s/p a renal allograft, 05, at [facility] with remote allograft dysfunction
 secondary to FSGS, with longstanding Type II   DM, hypertension, hypothyroidism, hyperlipid
emia, SHPTH,  previous low back pain, morbid obesity/JACK, chronic pulmonary  hypertension, h
istorically related to centripetal obesity, and  CAD, s/p CABG x3 vessels,. 
 
 She appears to be very consistent with her meds.  Apparently she had a detailed cardiology 
work-up for her bradycardia.  A 14-day monitor study showed symptomatic bradycardia, new ons
et A. fib, and apparently she is scheduled for an elective pacemaker in the near future.  Du
e to her risk for embolic CVA she was appropriately started on apixaban.  Additionally, an e
chocardiogram was done 10/31/2019, which showed her LVEF =55-60% mild aortic valve sclerosis
 without AS, mild TR, mild MR
 
 MEDS:
 Prograf1 mg BID.
 Qqeejcevpofzr500 mg, BID.
 Prednisone 5 mg, daily.
 Outpatient Medications Marked as Taking for the 19 encounter (Office Visit) with Rosa Moser, DO 
 Medication Sig Dispense Refill 
 
   allopurinol (ZYLOPRIM) 100 mg tablet take 1 tablet by mouth once daily 30 tablet 11 
   apixaban (ELIQUIS) 5 mg tablet Take 1 tablet by mouth 2 times daily. 180 tablet 3 
   aspirin 81 mg EC tablet Take 81 mg by mouth Daily.   
   B-D INS SYRINGE 0.5CC/31GX5/16 31G X 5/16" 0.5 ML MISC USE BEFORE MEALS AS DIRECTED 1 e
ach 11 
   cholecalciferol (VITAMIN D-3) 2000 UNITS TABS Take 2,000 Units by mouth Every other day
.   
   cinacalcet (SENSIPAR) 30 mg tablet take 1 tablet by mouth once daily 90 tablet 3 
   fludrocortisone (FLORINEF) 0.1 mg tablet Take 1 tablet by mouth Every other day. 45 tab
let 3 
   furosemide (LASIX) 40 mg tablet Take 1 tablet by mouth Daily as needed. 30 tablet 11 
   glucose blood VI test strips (ONE TOUCH ULTRA TEST) strip Check blood sugar before each
 meal and as directed 100 each 12 
   insulin glargine (LANTUS) 100 units/mL injection (vial) inject 20 units subcutaneously 
every morning 10 vial 11 
   insulin lispro (HUMALOG) 100 units/mL injection (vial) inject subcutaneously BEFORE CHIKA
LS ACCORDING TO SLIDING SCALE Max dose 20 units daily (Patient taking differently: inject henry
bcutaneously BEFORE MEALS ACCORDING TO SLIDING SCALE Max dose 8 units daily) 10 vial 11 
   levothyroxine (SYNTHROID) 50 mcg tablet take 1 tablet by mouth once daily 30 tablet 11 
   lisinopril (PRINIVIL,ZESTRIL) 30 MG tablet take 1 tablet by mouth once daily 90 tablet 
3 
   loperamide (ANTI-DIARRHEAL) 2 mg capsule Take 1 capsule by mouth 4 times daily as neede
d. 60 capsule 5 
   losartan (COZAAR) 50 mg tablet take 1 tablet by mouth once daily 90 tablet 3 
   magnesium oxide (MAG-OX) 400 mg tablet take 1 tablet by mouth twice a day 60 tablet 11 
   Prenatal Vit-Fe Fumarate-FA (PNV PRENATAL PLUS MULTIVITAMIN) 27-1 MG TABS take 1 tablet
 by mouth once daily. 30 tablet 11 
   Respiratory Therapy Supplies MISC Continue nocturnal O2 at 2 l/m through CPAP for lifet
bart. Dx: JACK G47.33 Please provide new nasal mask for CPAP and any other needed replacement 
supplies. 1 each 0 
   Respiratory Therapy Supplies MISC Decrease CPAP to 12-18 cmH2O Diagnosis Code(s)327.23.
 Please send order to [facility]. 1 each 0 
   rosuvastatin (CRESTOR) 20 mg tablet take 1 tablet by mouth NIGHTLY 30 tablet 11 
  
 
 Allergies 
 Allergen Reactions 
   Tape [Adhesive & Tape] Rash 
  
 
 Objective: /80  | Pulse 68 Comment: apical | Temp 35.6 C (96 F)  | Wt 115.7 kg (2
55 lb)  | BMI 41.16 kg/m   
 
 Physical Exam
 HEENT:  no thrush.
 Heart:   irregularly, irregular with grade 1-2/6 MAYA heard best at LUSB, no S3 or rub.
 Lungs:  CTA bilaterally.  No rales or wheezes.
 Abdomen:  soft, obese, renal allograft RLQ is nontender,  NABS.
 Extremities: 1+ no edema,  no clubbing, or cyanosis.  
 
 Lab Results 
 Component Value Date 
  NAEX 143 2019 
  KEX 4.7 2019 
  CLEX 111 2019 
  CO2EX 21 2019 
  BUNEX 33 (A) 2019 
  CREEX 1.29 (A) 2019 
  EGFREX 41 2019 
  GLUEX 94 2019 
 
  PHOSEX 2.9 2019 
  MGEX 2 2019 
  PTHEX 193.0 (A) 2016 
  ANS4DKA 6.4 2019 
 
 Lab Results 
 Component Value Date 
  WBCEX 4.8 2019 
  HGBEX 13 2019 
  HCTEX 40.3 2019 
  PLTEX 177 2019 
 
 Lab Results 
 Component Value Date 
  TACROLIMUSEX 5.5 2019 
 
 Urine Pro/Cr ratio = not available.
 
 Assessment: 
 
 1.   Renal Allograft, 05--her Scr is stable.
 2.  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
 3.   Persistent AF--stable on rate control, apixaban.
 4.  Type 2 DM, requiring insuline--excellent control.
 5.  Hyperlipidemia--on statin therapy.
 6.   Hypertension-- excellent control.
 7.  SHPTH--serum Ca++ is slightly increased?
 8.  Morbid Obesity/JACK/Pulmonary hypertension--her weight is slowly improving over time.
 9.  CAD, s/p CABG  3 vessels, --stable.
 10.  Type IV RTA--compensated.
 11.  Chronic Low Back pain--in remission.
 12. chronic DJD, left knee--appears to progress over time but manageable.
 
 Plan: 
 
 1.   I reviewed with Viviana her lab, Scr, and tacrolimus level.  The tacro level appears stab
le on her current dose.
 2.  Need to ensure that the urine Pro/Cr ratio is included on her standing order.
 3.  I encouraged Viviana that she appears to be doing very well with her medication management
.
 4.  Also, I did discuss with Viviana that if she is having symptomatic bradycardia, then impla
ntation of an appropriate pacemaker might give her more energy overall?
 5.  Will plan to see her back in 6  months at the [facility] at [place], [place].  She
 will have her Standing Order, drug level, HbA1c, lipid profile, TSH and  Urine Pro/Cr ratio
 done one week prior to that.
 
 Electronically signed by Marzena Moser DO. 19 8:43 AM 
 
 CC:   Jose David Dalal M.D., [facility], [facility]
           Tonia TAMAYO
 Electronically signed by Marzena Moser DO at 2019  3:31 PM PSTdocumented in thi
s encounter
 
 Plan of Treatment
 
 
+--------+-----------+------------+----------------------+-------------------+
| Date   | Type      | Specialty  | Care Team            | Description       |
+--------+-----------+------------+----------------------+-------------------+
| / | Office    | Cardiology |   Tonia Wilson,    |                   |
 
|    | Visit     |            | ARNP  [address]   |                   |
|        |           |            | St  [facility] [place]  |                   |
|        |           |            | [address]  [phone]  |                   |
|        |           |            |  [phone] (Fax)  |                   |
+--------+-----------+------------+----------------------+-------------------+
| / | Hospital  | Radiology  |   Popeye Townsend, |                   |
|    | Encounter |            |  MD  [address] |                   |
|        |           |            |  St.  [place],   |                   |
|        |           |            | [place] [address]             |                   |
|        |           |            | [phone]         |                   |
|        |           |            | [phone] (Fax)   |                   |
+--------+-----------+------------+----------------------+-------------------+
| / | Surgery   | Radiology  |   Popeye Townsend, | CV EP PPM SYSTEM  |
|    |           |            |  MD  [address] | IMPLANT           |
|        |           |            |  St.  [place],   |                   |
|        |           |            | [place] [address]             |                   |
|        |           |            | [phone]         |                   |
|        |           |            | [phone] (Fax)   |                   |
+--------+-----------+------------+----------------------+-------------------+
| 02/10/ | Clinical  | Cardiology |                      |                   |
|    | Support   |            |                      |                   |
+--------+-----------+------------+----------------------+-------------------+
| / | Office    | Cardiology |   RakeshTonia andre,    |                   |
|    | Visit     |            | POP  [address]   |                   |
|        |           |            | [place]  |                   |
|        |           |            | [id number] [phone]  |                   |
|        |           |            |  [phone] (Fax)  |                   |
+--------+-----------+------------+----------------------+-------------------+
| / | Off-Site  | Nephrology |   Marzena Moser  |                   |
2020   | Visit     |            | M, DO  [address]      |                   |
|        |           |            | [address]      |                   |
|        |           |            | [place]      |                   |
|        |           |            | [id number] [phone]  |                   |
|        |           |            |  [phone] (Fax)  |                   |
+--------+-----------+------------+----------------------+-------------------+
 documented as of this encounter
 
 Procedures
 
 
+----------------------+--------+-------------+----------------------+----------------------
+
| Procedure Name       | Priori | Date/Time   | Associated Diagnosis | Comments             
|
|                      | ty     |             |                      |                      
|
+----------------------+--------+-------------+----------------------+----------------------
+
| EXTERNAL LAB:        | Routin | 2019  |                      |   Results for this   
|
| HEMOGLOBIN A1C       | e      |             |                      | procedure are in the 
|
|                      |        |             |                      |  results section.    
|
+----------------------+--------+-------------+----------------------+----------------------
+
| LABS - EXTERNAL SCAN |        | 2019  |                      |   Results for this   
|
|                      |        | 12:00 AM    |                      | procedure are in the 
|
 
|                      |        | PST         |                      |  results section.    
|
+----------------------+--------+-------------+----------------------+----------------------
+
 documented in this encounter
 
 Results
 LABS - EXTERNAL SCAN (2019 12:00 AM PST)
 
+------------------------------------------------------------------------+--------------+
| Narrative                                                              | Performed At |
+------------------------------------------------------------------------+--------------+
|   This result has an attachment that is not available.  Ordered by an  |              |
| unspecified provider.                                                  |              |
+------------------------------------------------------------------------+--------------+
 External Lab: Hemoglobin A1c (2019)
 
+-------------+-------+-----------+------------+--------------+
| Component   | Value | Ref Range | Performed  | Pathologist  |
|             |       |           | At         | Signature    |
+-------------+-------+-----------+------------+--------------+
| Hemoglobin  | 6.4   | %         |            |              |
| A1c,        |       |           |            |              |
| external    |       |           |            |              |
+-------------+-------+-----------+------------+--------------+
 
 
 
+----------+
| Specimen |
+----------+
| Blood    |
+----------+
 documented in this encounter
 
 Visit Diagnoses
 
 
+---------------------------------------------------------------+
| Diagnosis                                                     |
+---------------------------------------------------------------+
|   Kidney replaced by transplant - Primary                     |
+---------------------------------------------------------------+
|   Hypertension, essential  Unspecified essential hypertension |
+---------------------------------------------------------------+
|   Persistent atrial fibrillation  Atrial fibrillation         |
+---------------------------------------------------------------+
|   Type 2 DM with CKD stage 2 and hypertension (HCC)           |
+---------------------------------------------------------------+
 documented in this encounter

## 2020-01-08 NOTE — XMS
Encounter Summary
  Created on: 2020
 
 Viviana Ricci
 External Reference #: [id number]
 : 47
 Sex: Female
 
 Demographics
 
 
+-----------------------+----------------------+
| Address               | [address]      |
|                       | [place]  [address] |
+-----------------------+----------------------+
| Home Phone            | [phone]      |
+-----------------------+----------------------+
| Preferred Language    | Unknown              |
+-----------------------+----------------------+
| Marital Status        | Single               |
+-----------------------+----------------------+
| Nondenominational Affiliation | [id number]                 |
+-----------------------+----------------------+
| Race                  | Unknown              |
+-----------------------+----------------------+
| Ethnic Group          | Unknown              |
+-----------------------+----------------------+
 
 
 Author
 
 
+--------------+--------------------------------------------+
| Author       | [facility]  |
|              | and [place]                                |
+--------------+--------------------------------------------+
| Organization | [facility]  |
|              | and [place]                                |
+--------------+--------------------------------------------+
| Address      | Unknown                                    |
+--------------+--------------------------------------------+
| Phone        | Unavailable                                |
+--------------+--------------------------------------------+
 
 
 
 Support
 
 
+----------------+--------------+---------------------+-----------------+
| Name           | Relationship | Address             | Phone           |
+----------------+--------------+---------------------+-----------------+
| Ada/Ed Radhames | ECON         | [place]              | [phone] |
|                |              | [place]  |                 |
|                |              |  [address]              |                 |
+----------------+--------------+---------------------+-----------------+
 
 
 
 
 Care Team Providers
 
 
+-----------------------+------+-------------+
| Care Team Member Name | Role | Phone       |
+-----------------------+------+-------------+
 PCP  | Unavailable |
+-----------------------+------+-------------+
 
 
 
 Reason for Visit
 
 
+-------------------+----------+
| Reason            | Comments |
+-------------------+----------+
| Medication Refill |          |
+-------------------+----------+
 
 
 
 Encounter Details
 
 
+--------+--------+---------------------+----------------------+-------------------+
| Date   | Type   | Department          | Care Team            | Description       |
+--------+--------+---------------------+----------------------+-------------------+
| / | Refill |   [facility]         |   Marzena Moser  | Medication Refill |
|    |        | NEPHROLOGY  [address]   | M, DO  [address]      |                   |
|        |        | [address]   | [address]      |                   |
|        |        | [place], [place]     | [place], [place]      |                   |
|        |        | [address]          | [address]  [phone]  |                   |
|        |        | [phone]        |  [phone] (Fax)  |                   |
+--------+--------+---------------------+----------------------+-------------------+
 
 
 
 Social History
 
 
+--------------+-------+-----------+--------+------+
| Tobacco Use  | Types | Packs/Day | Years  | Date |
|              |       |           | Used   |      |
+--------------+-------+-----------+--------+------+
| Never Smoker |       |           |        |      |
+--------------+-------+-----------+--------+------+
 
 
 
+---------------------+---+---+---+
| Smokeless Tobacco:  |   |   |   |
| Never Used          |   |   |   |
+---------------------+---+---+---+
 
 
 
+---------------------------------------------------------------+
| Comments: some second hand smoke exposure, but fairly minimal |
 
+---------------------------------------------------------------+
 
 
 
+-------------+-------------+---------+----------+
| Alcohol Use | Drinks/Week | oz/Week | Comments |
+-------------+-------------+---------+----------+
| No          |             |         |          |
+-------------+-------------+---------+----------+
 
 
 
+------------------+---------------+
| Sex Assigned at  | Date Recorded |
| Birth            |               |
+------------------+---------------+
| Not on file      |               |
+------------------+---------------+
 
 
 
+----------------+-------------+-------------+
| Job Start Date | Occupation  | Industry    |
+----------------+-------------+-------------+
| Not on file    | Not on file | Not on file |
+----------------+-------------+-------------+
 
 
 
+----------------+--------------+------------+
| Travel History | Travel Start | Travel End |
+----------------+--------------+------------+
 
 
 
+-------------------------------------+
| No recent travel history available. |
+-------------------------------------+
 documented as of this encounter
 
 Plan of Treatment
 
 
+--------+-----------+------------+----------------------+-------------------+
| Date   | Type      | Specialty  | Care Team            | Description       |
+--------+-----------+------------+----------------------+-------------------+
| / | Office    | Cardiology |   HellalfredoTonia,    |                   |
|    | Visit     |            | ARNP  [address]   |                   |
|        |           |            | St  [place], [place]  |                   |
|        |           |            | [address]  [phone]  |                   |
|        |           |            |  [phone] (Fax)  |                   |
+--------+-----------+------------+----------------------+-------------------+
| / | Hospital  | Radiology  |   Popeye Townsend, |                   |
|    | Encounter |            |  MD  [address] |                   |
|        |           |            |  St.  [place],   |                   |
|        |           |            | [place] [address]             |                   |
|        |           |            | [phone]         |                   |
|        |           |            | [phone] (Fax)   |                   |
+--------+-----------+------------+----------------------+-------------------+
| / | Surgery   | Radiology  |   Popeye Townsend, | CV EP PPM SYSTEM  |
 
|    |           |            |  MD  [address] | IMPLANT           |
|        |           |            |  St.  [place],   |                   |
|        |           |            | [place] [address]             |                   |
|        |           |            | [phone]         |                   |
|        |           |            | [phone] (Fax)   |                   |
+--------+-----------+------------+----------------------+-------------------+
| 02/10/ | Clinical  | Cardiology |                      |                   |
|    | Support   |            |                      |                   |
+--------+-----------+------------+----------------------+-------------------+
| / | Office    | Cardiology |   Tonia Wilson,    |                   |
|    | Visit     |            | ARNP  [address]   |                   |
|        |           |            | [place]  |                   |
|        |           |            | [address]  [phone]  |                   |
|        |           |            |  [phone] (Fax)  |                   |
+--------+-----------+------------+----------------------+-------------------+
| / | Off-Site  | Nephrology |   Marzena Moser  |                   |
|    | Visit     |            | DO ETIENNE  [address] [place] [address]      |                   |
|        |           |            | [address]      |                   |
|        |           |            | [place]      |                   |
|        |           |            | [address]  [phone]  |                   |
|        |           |            |  [phone] (Fax)  |                   |
+--------+-----------+------------+----------------------+-------------------+
 documented as of this encounter
 
 Visit Diagnoses
 Not on filedocumented in this encounter

## 2020-01-08 NOTE — XMS
Encounter Summary
  Created on: 2020
 
 Viviana Ricci
 External Reference #: [id number]
 : 47
 Sex: Female
 
 Demographics
 
 
+-----------------------+----------------------+
| Address               | [address]      |
|                       | [place]  [address] |
+-----------------------+----------------------+
| Home Phone            | [phone]      |
+-----------------------+----------------------+
| Preferred Language    | Unknown              |
+-----------------------+----------------------+
| Marital Status        | Single               |
+-----------------------+----------------------+
| Jehovah's witness Affiliation | [id number]                 |
+-----------------------+----------------------+
| Race                  | Unknown              |
+-----------------------+----------------------+
| Ethnic Group          | Unknown              |
+-----------------------+----------------------+
 
 
 Author
 
 
+--------------+--------------------------------------------+
| Author       | [facility]  |
|              | and [place]                                |
+--------------+--------------------------------------------+
| Organization | [facility]  |
|              | and [place]                                |
+--------------+--------------------------------------------+
| Address      | Unknown                                    |
+--------------+--------------------------------------------+
| Phone        | Unavailable                                |
+--------------+--------------------------------------------+
 
 
 
 Support
 
 
+----------------+--------------+---------------------+-----------------+
| Name           | Relationship | Address             | Phone           |
+----------------+--------------+---------------------+-----------------+
| Ada/Ed Radhames | ECON         | [place]              | [phone] |
|                |              | [place]  |                 |
|                |              |  [address]              |                 |
+----------------+--------------+---------------------+-----------------+
 
 
 
 
 Care Team Providers
 
 
+-----------------------+------+-------------+
| Care Team Member Name | Role | Phone       |
+-----------------------+------+-------------+
 PCP  | Unavailable |
+-----------------------+------+-------------+
 
 
 
 Reason for Visit
 
 
+-------------------+----------+
| Reason            | Comments |
+-------------------+----------+
| Medication Refill |          |
+-------------------+----------+
 
 
 
 Encounter Details
 
 
+--------+--------+---------------------+----------------------+-------------------+
| Date   | Type   | Department          | Care Team            | Description       |
+--------+--------+---------------------+----------------------+-------------------+
| / | Refill |   [facility]         |   Marzena Moser  | Medication Refill |
| 2017   |        | NEPHROLOGY  [address]   | M, DO  [address]      |                   |
|        |        | [address]   | [address]      |                   |
|        |        | [place], [place]     | [place], [place]      |                   |
|        |        | [address]          | [address]  [phone]  |                   |
|        |        | [phone]        |  [phone] (Fax)  |                   |
+--------+--------+---------------------+----------------------+-------------------+
 
 
 
 Social History
 
 
+--------------+-------+-----------+--------+------+
| Tobacco Use  | Types | Packs/Day | Years  | Date |
|              |       |           | Used   |      |
+--------------+-------+-----------+--------+------+
| Never Smoker |       |           |        |      |
+--------------+-------+-----------+--------+------+
 
 
 
+---------------------+---+---+---+
| Smokeless Tobacco:  |   |   |   |
| Never Used          |   |   |   |
+---------------------+---+---+---+
 
 
 
+---------------------------------------------------------------+
| Comments: some second hand smoke exposure, but fairly minimal |
 
+---------------------------------------------------------------+
 
 
 
+-------------+-------------+---------+----------+
| Alcohol Use | Drinks/Week | oz/Week | Comments |
+-------------+-------------+---------+----------+
| No          |             |         |          |
+-------------+-------------+---------+----------+
 
 
 
+------------------+---------------+
| Sex Assigned at  | Date Recorded |
| Birth            |               |
+------------------+---------------+
| Not on file      |               |
+------------------+---------------+
 
 
 
+----------------+-------------+-------------+
| Job Start Date | Occupation  | Industry    |
+----------------+-------------+-------------+
| Not on file    | Not on file | Not on file |
+----------------+-------------+-------------+
 
 
 
+----------------+--------------+------------+
| Travel History | Travel Start | Travel End |
+----------------+--------------+------------+
 
 
 
+-------------------------------------+
| No recent travel history available. |
+-------------------------------------+
 documented as of this encounter
 
 Plan of Treatment
 
 
+--------+-----------+------------+----------------------+-------------------+
| Date   | Type      | Specialty  | Care Team            | Description       |
+--------+-----------+------------+----------------------+-------------------+
| / | Office    | Cardiology |   HellalfredoTonia,    |                   |
|    | Visit     |            | ARNP  [address]   |                   |
|        |           |            | St  [place], [place]  |                   |
|        |           |            | [address]  [phone]  |                   |
|        |           |            |  [phone] (Fax)  |                   |
+--------+-----------+------------+----------------------+-------------------+
| / | Hospital  | Radiology  |   Popeye Townsend, |                   |
|    | Encounter |            |  MD  [address] |                   |
|        |           |            |  St.  [place],   |                   |
|        |           |            | [place] [address]             |                   |
|        |           |            | [phone]         |                   |
|        |           |            | [phone] (Fax)   |                   |
+--------+-----------+------------+----------------------+-------------------+
| / | Surgery   | Radiology  |   Popeye Townsend, | CV EP PPM SYSTEM  |
 
|    |           |            |  MD  [address] | IMPLANT           |
|        |           |            |  St.  [place],   |                   |
|        |           |            | [place] [address]             |                   |
|        |           |            | [phone]         |                   |
|        |           |            | [phone] (Fax)   |                   |
+--------+-----------+------------+----------------------+-------------------+
| 02/10/ | Clinical  | Cardiology |                      |                   |
|    | Support   |            |                      |                   |
+--------+-----------+------------+----------------------+-------------------+
| / | Office    | Cardiology |   Tonia Wilson,    |                   |
|    | Visit     |            | ARNP  [address]   |                   |
|        |           |            | [place]  |                   |
|        |           |            | [address]  [phone]  |                   |
|        |           |            |  [phone] (Fax)  |                   |
+--------+-----------+------------+----------------------+-------------------+
| / | Off-Site  | Nephrology |   Marzena Moser  |                   |
|    | Visit     |            | DO ETIENNE  [address] [place] [address]      |                   |
|        |           |            | [address]      |                   |
|        |           |            | [place]      |                   |
|        |           |            | [address]  [phone]  |                   |
|        |           |            |  [phone] (Fax)  |                   |
+--------+-----------+------------+----------------------+-------------------+
 documented as of this encounter
 
 Visit Diagnoses
 
 
+------------------------------------------------------------------------------+
| Diagnosis                                                                    |
+------------------------------------------------------------------------------+
|   Essential hypertension with goal blood pressure less than 130/80 - Primary |
+------------------------------------------------------------------------------+
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
+------------------------------------------------------------------------------+
|   Other specified hypothyroidism                                             |
+------------------------------------------------------------------------------+
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
+------------------------------------------------------------------------------+
 documented in this encounter

## 2020-01-08 NOTE — XMS
Encounter Summary
  Created on: 2020
 
 Viviana Ricci
 External Reference #: [id number]
 : 47
 Sex: Female
 
 Demographics
 
 
+-----------------------+----------------------+
| Address               | [address]      |
|                       | [place]  [address] |
+-----------------------+----------------------+
| Home Phone            | [phone]      |
+-----------------------+----------------------+
| Preferred Language    | Unknown              |
+-----------------------+----------------------+
| Marital Status        | Single               |
+-----------------------+----------------------+
| Yarsanism Affiliation | [id number]                 |
+-----------------------+----------------------+
| Race                  | Unknown              |
+-----------------------+----------------------+
| Ethnic Group          | Unknown              |
+-----------------------+----------------------+
 
 
 Author
 
 
+--------------+--------------------------------------------+
| Author       | [facility]  |
|              | and [facility]                                |
+--------------+--------------------------------------------+
| Organization | [facility]  |
|              | and [facility]                                |
+--------------+--------------------------------------------+
| Address      | Unknown                                    |
+--------------+--------------------------------------------+
| Phone        | Unavailable                                |
+--------------+--------------------------------------------+
 
 
 
 Support
 
 
+----------------+--------------+---------------------+-----------------+
| Name           | Relationship | Address             | Phone           |
+----------------+--------------+---------------------+-----------------+
| Ada/Ed Radhames | ECON         | [address]              | [phone] |
|                |              | [place]  |                 |
|                |              |  [address]              |                 |
+----------------+--------------+---------------------+-----------------+
 
 
 
 
 Care Team Providers
 
 
+-----------------------+------+-------------+
| Care Team Member Name | Role | Phone       |
+-----------------------+------+-------------+
 PCP  | Unavailable |
+-----------------------+------+-------------+
 
 
 
 Reason for Visit
 
 
+-------------------+----------+
| Reason            | Comments |
+-------------------+----------+
| Medication Refill |          |
+-------------------+----------+
 
 
 
 Encounter Details
 
 
+--------+--------+---------------------+----------------------+-------------------+
| Date   | Type   | Department          | Care Team            | Description       |
+--------+--------+---------------------+----------------------+-------------------+
| / | Refill |   [facility]         |   Marzena Moser  | Medication Refill |
| 2018   |        | NEPHROLOGY  [address]   | M, DO  [address]      |                   |
|        |        | [address]   | [address]      |                   |
|        |        | [place], [place]     | [place], [place]      |                   |
|        |        | [address]          | [address]  [phone]  |                   |
|        |        | [phone]        |  [phone] (Fax)  |                   |
+--------+--------+---------------------+----------------------+-------------------+
 
 
 
 Social History
 
 
+--------------+-------+-----------+--------+------+
| Tobacco Use  | Types | Packs/Day | Years  | Date |
|              |       |           | Used   |      |
+--------------+-------+-----------+--------+------+
| Never Smoker |       |           |        |      |
+--------------+-------+-----------+--------+------+
 
 
 
+---------------------+---+---+---+
| Smokeless Tobacco:  |   |   |   |
| Never Used          |   |   |   |
+---------------------+---+---+---+
 
 
 
+---------------------------------------------------------------+
| Comments: some second hand smoke exposure, but fairly minimal |
 
+---------------------------------------------------------------+
 
 
 
+-------------+-------------+---------+----------+
| Alcohol Use | Drinks/Week | oz/Week | Comments |
+-------------+-------------+---------+----------+
| No          |             |         |          |
+-------------+-------------+---------+----------+
 
 
 
+------------------+---------------+
| Sex Assigned at  | Date Recorded |
| Birth            |               |
+------------------+---------------+
| Not on file      |               |
+------------------+---------------+
 
 
 
+----------------+-------------+-------------+
| Job Start Date | Occupation  | Industry    |
+----------------+-------------+-------------+
| Not on file    | Not on file | Not on file |
+----------------+-------------+-------------+
 
 
 
+----------------+--------------+------------+
| Travel History | Travel Start | Travel End |
+----------------+--------------+------------+
 
 
 
+-------------------------------------+
| No recent travel history available. |
+-------------------------------------+
 documented as of this encounter
 
 Plan of Treatment
 
 
+--------+-----------+------------+----------------------+-------------------+
| Date   | Type      | Specialty  | Care Team            | Description       |
+--------+-----------+------------+----------------------+-------------------+
| / | Office    | Cardiology |   HellalfredoTonia,    |                   |
|    | Visit     |            | ARNP  [address]   |                   |
|        |           |            | St  [place], [place]  |                   |
|        |           |            | [address]  [phone]  |                   |
|        |           |            |  [phone] (Fax)  |                   |
+--------+-----------+------------+----------------------+-------------------+
| / | Hospital  | Radiology  |   Popeye Townsend, |                   |
|    | Encounter |            |  MD  [address] |                   |
|        |           |            |  St.  [place],   |                   |
|        |           |            | [place] [address]             |                   |
|        |           |            | [phone]         |                   |
|        |           |            | [phone] (Fax)   |                   |
+--------+-----------+------------+----------------------+-------------------+
| / | Surgery   | Radiology  |   Popeye Townsend, | CV EP PPM SYSTEM  |
 
|    |           |            |  MD  [address] | IMPLANT           |
|        |           |            |  St.  [place],   |                   |
|        |           |            | [place] [address]             |                   |
|        |           |            | [phone]         |                   |
|        |           |            | [phone] (Fax)   |                   |
+--------+-----------+------------+----------------------+-------------------+
| 02/10/ | Clinical  | Cardiology |                      |                   |
|    | Support   |            |                      |                   |
+--------+-----------+------------+----------------------+-------------------+
| / | Office    | Cardiology |   Tonia Wilson,    |                   |
|    | Visit     |            | ARNP  [address]   |                   |
|        |           |            | [place]  |                   |
|        |           |            | [address]  [phone]  |                   |
|        |           |            |  [phone] (Fax)  |                   |
+--------+-----------+------------+----------------------+-------------------+
| / | Off-Site  | Nephrology |   Marzena Moser  |                   |
|    | Visit     |            | DO ETIENNE  [address] [place] [address]      |                   |
|        |           |            | [address]      |                   |
|        |           |            | [place]      |                   |
|        |           |            | [address]  [phone]  |                   |
|        |           |            |  [phone] (Fax)  |                   |
+--------+-----------+------------+----------------------+-------------------+
 documented as of this encounter
 
 Visit Diagnoses
 Not on filedocumented in this encounter

## 2020-01-08 NOTE — XMS
Encounter Summary
  Created on: 2020
 
 Viviana Ricci
 External Reference #: [id number]
 : 47
 Sex: Female
 
 Demographics
 
 
+-----------------------+----------------------+
| Address               | [address]      |
|                       | [place]  [address] |
+-----------------------+----------------------+
| Home Phone            | [phone]      |
+-----------------------+----------------------+
| Preferred Language    | Unknown              |
+-----------------------+----------------------+
| Marital Status        | Single               |
+-----------------------+----------------------+
| Restoration Affiliation | [id number]                 |
+-----------------------+----------------------+
| Race                  | Unknown              |
+-----------------------+----------------------+
| Ethnic Group          | Unknown              |
+-----------------------+----------------------+
 
 
 Author
 
 
+--------------+--------------------------------------------+
| Author       | Merged with [facility]  |
|              | and [facility]                                |
+--------------+--------------------------------------------+
| Organization | Merged with [facility]  |
|              | and [facility]                                |
+--------------+--------------------------------------------+
| Address      | Unknown                                    |
+--------------+--------------------------------------------+
| Phone        | Unavailable                                |
+--------------+--------------------------------------------+
 
 
 
 Support
 
 
+----------------+--------------+---------------------+-----------------+
| Name           | Relationship | Address             | Phone           |
+----------------+--------------+---------------------+-----------------+
| Ada/Ed Radhames | ECON         | [address]              | [phone] |
|                |              | [place]  |                 |
|                |              |  [id number]              |                 |
+----------------+--------------+---------------------+-----------------+
 
 
 
 
 Care Team Providers
 
 
+-----------------------+------+-------------+
| Care Team Member Name | Role | Phone       |
+-----------------------+------+-------------+
 PCP  | Unavailable |
+-----------------------+------+-------------+
 
 
 
 Reason for Visit
 
 
+------------------+----------+
| Reason           | Comments |
+------------------+----------+
| Follow-up        |          |
+------------------+----------+
| Coronary Artery  |          |
| Disease          |          |
+------------------+----------+
| Hypertension     |          |
+------------------+----------+
| Hyperlipidemia   |          |
+------------------+----------+
 
 
 
 Encounter Details
 
 
+--------+---------+----------------------+----------------------+-------------------+
| Date   | Type    | Department           | Care Team            | Description       |
+--------+---------+----------------------+----------------------+-------------------+
| / | Office  |   [facility]          |   Tonia Wilson,    | Coronary artery   |
|    | Visit   | CARDIOLOGY  [address]    | ARNP  [address]   | disease (Primary  |
|        |         | [address]  [place], | [address]  [facility] [place]  | Dx); HTN          |
|        |         |  [place] [address]       | [address]  [phone]  | (hypertension);   |
|        |         | [phone]         |  [phone] (Fax)  | Hyperlipidemia    |
+--------+---------+----------------------+----------------------+-------------------+
 
 
 
 Social History
 
 
+--------------+-------+-----------+--------+------+
| Tobacco Use  | Types | Packs/Day | Years  | Date |
|              |       |           | Used   |      |
+--------------+-------+-----------+--------+------+
| Never Smoker |       |           |        |      |
+--------------+-------+-----------+--------+------+
 
 
 
+---------------------+---+---+---+
| Smokeless Tobacco:  |   |   |   |
| Never Used          |   |   |   |
 
+---------------------+---+---+---+
 
 
 
+---------------------------------------------------------------+
| Comments: some second hand smoke exposure, but fairly minimal |
+---------------------------------------------------------------+
 
 
 
+-------------+-------------+---------+----------+
| Alcohol Use | Drinks/Week | oz/Week | Comments |
+-------------+-------------+---------+----------+
| No          |             |         |          |
+-------------+-------------+---------+----------+
 
 
 
+------------------+---------------+
| Sex Assigned at  | Date Recorded |
| Birth            |               |
+------------------+---------------+
| Not on file      |               |
+------------------+---------------+
 
 
 
+----------------+-------------+-------------+
| Job Start Date | Occupation  | Industry    |
+----------------+-------------+-------------+
| Not on file    | Not on file | Not on file |
+----------------+-------------+-------------+
 
 
 
+----------------+--------------+------------+
| Travel History | Travel Start | Travel End |
+----------------+--------------+------------+
 
 
 
+-------------------------------------+
| No recent travel history available. |
+-------------------------------------+
 documented as of this encounter
 
 Last Filed Vital Signs
 
 
+-------------------+-------------------+----------------------+----------+
| Vital Sign        | Reading           | Time Taken           | Comments |
+-------------------+-------------------+----------------------+----------+
| Blood Pressure    | 92/64             | 2014 12:58 PM  |          |
|                   |                   | PST                  |          |
+-------------------+-------------------+----------------------+----------+
| Pulse             | 70                | 2014 12:58 PM  |          |
|                   |                   | PST                  |          |
+-------------------+-------------------+----------------------+----------+
| Temperature       | -                 | -                    |          |
+-------------------+-------------------+----------------------+----------+
 
| Respiratory Rate  | 20                | 2014 12:58 PM  |          |
|                   |                   | PST                  |          |
+-------------------+-------------------+----------------------+----------+
| Oxygen Saturation | -                 | -                    |          |
+-------------------+-------------------+----------------------+----------+
| Inhaled Oxygen    | -                 | -                    |          |
| Concentration     |                   |                      |          |
+-------------------+-------------------+----------------------+----------+
| Weight            | 122.9 kg (271 lb) | 2014 12:58 PM  |          |
|                   |                   | PST                  |          |
+-------------------+-------------------+----------------------+----------+
| Height            | 167.6 cm (5' 6")  | 2014 12:58 PM  |          |
|                   |                   | PST                  |          |
+-------------------+-------------------+----------------------+----------+
| Body Mass Index   | 43.74             | 2014 12:58 PM  |          |
|                   |                   | PST                  |          |
+-------------------+-------------------+----------------------+----------+
 documented in this encounter
 
 Progress Notes
 Tonia Wilson ARNP - 2014  1:18 PM PSTFormatting of this note might be different fr
om the original.
    
 
 PATIENT NAME:  Viviana Ricci  
 : 1945:         AGE: 68 y.o.
  PRIMARY CARE:
 Marzena Moser DO 
 
 OUTPATIENT FOLLOW UP VISIT
 
 Date of Service: 2014
 
 HISTORY OF PRESENT ILLNESS:
 Viviana Ricci is a 68 y.o. female with a history of CAD post CABG x 3 in , history of 
diabetes, renal failure post a renal transplantation, morbid obesity, inactivity, hypertensi
on, hyperlipidemia, pulmonary hypertension and severe arthritis.  She is being seen today fo
r follow up coronary artery disease.
 
 She was last seen 14 at which time she was planning to have knee surgery.  Since that 
time, she reports she was trying to do physical therapy prior to surgery, but then she start
ed to have problems with her back, requiring injection last month, which helped.  She has fe
lt well from a heart standpoint.  shortness of breath with activity such as carrying her yani
ceries up to 3 steps into her house, primarily when she is in more pain, and not as much on 
other days when her pain in her back and knee is better.  She has not had any symptoms of ch
est pain at rest or with activity.  She denies any lightheadedness or dizziness or palpitati
ons.  She chronically gets a little bit of ankle swelling by the end of the day, and she fee
ls that it is worse on left than right, and taking furosemide did not seem to help, but it h
as been minimal.  She lays flat at night on one pillow with her CPAP machine in place, and h
as no symptoms of shortness of breath with this.
 
 MEDICAL, SURGICAL, AND PERSONAL HISTORY
 Past Medical, Surgical, Family, and Social History are reviewed in EPIC.
 
 CURRENT PROBLEMS
 Patient Active Problem List 
 Diagnosis 
   Chronic low back pain 
   Hypothyroidism 
   Hyperlipidemia 
 
   Complications of transplanted kidney 
   Movement disorder 
   Diabetes mellitus type II, uncontrolled  
   Pulmonary hypertension  
   Coronary artery disease 
   Unspecified hypertensive kidney disease with chronic kidney disease stage I through sta
ge IV, or unspecified 
   JACK on CPAP 
   Obesity hypoventilation syndrome 
   Kidney replaced by transplant 
   Secondary hyperparathyroidism 
   Dyspnea 
   FSGS (focal segmental glomerulosclerosis) 
   Murmur 
   Cardiomegaly 
   HTN (hypertension) 
   PLMD (periodic limb movement disorder) 
   Chest pain 
 
 CURRENT MEDICATIONS
 Current Outpatient Prescriptions 
 Medication Sig Dispense Refill 
   allopurinol (ZYLOPRIM) 100 mg tablet Take 1 tablet by mouth Daily.  30 tablet  11 
   aspirin 81 MG EC tablet Take 81 mg by mouth Daily.     
   cholecalciferol (VITAMIN D-3) 2000 UNITS TABS Take 5,000 Units by mouth Once a week.   
  
   cinacalcet (SENSIPAR) 30 mg tablet Take 1 tablet by mouth Daily.  30 tablet  12 
   fludrocortisone (FLORINEF) 0.1 mg tablet Take 1 tablet by mouth Every other day.  45 ta
blet  2 
   fluticasone (FLOVENT HFA) 220 mcg/puff inhaler Inhale 1 puff into the lungs 2 times shante
ly. Rinse mouth after use.  1 Inhaler  11 
   furosemide (LASIX) 40 mg tablet Take 40 mg by mouth Daily as needed.     
   glucose blood VI test strips (ONE TOUCH ULTRA TEST) strip Check blood sugar before each
 meal and as directed  100 each  12 
   insulin glargine (LANTUS) 100 units/mL injection Inject 20 Units under the skin every m
orning.  10 mL  11 
   insulin lispro (HUMALOG) 100 units/mL injection Inject subcutaneously before meals acco
rding to sliding scale  10 vial  11 
   Insulin Syringe-Needle U-100 (BD INSULIN SYRINGE ULTRAFINE) 31G X 5/16" 0.5 ML MISC Use
 before meals and as directed.  100 each  11 
   levothyroxine (LEVOTHROID) 50 mcg tablet Take 1 tablet by mouth Daily.  30 tablet  11 
   lisinopril (PRINIVIL,ZESTRIL) 30 MG tablet Take 1 tablet by mouth Daily.  90 tablet  3 
   loperamide (ANTI-DIARRHEAL) 2 mg capsule Take 1 capsule by mouth 4 times daily as neede
d.  60 capsule  5 
   magnesium oxide (MAG-OX) 400 mg tablet Take 1 tablet by mouth 2 times daily.  62 tablet
  12 
   metoprolol tartrate (LOPRESSOR) 25 mg tablet Take 1 tablet by mouth 2 times daily.  60 
tablet  11 
   mycophenolate (CELLCEPT) 250 mg capsule Take 250 mg by mouth 3 times daily.     
   omeprazole (PRILOSEC) 20 mg capsule Take one capsule by mouth once daily on an empty st
omach  90 capsule  3 
   predniSONE (DELTASONE) 5 mg tablet Take 1 tablet by mouth Daily.  90 tablet  3 
   Prenatal Multivit-Min-Fe-FA (PRENATAL VITAMINS) 0.8 MG TABS Take 0.8 mg by mouth Daily.
  30 each  11 
   Respiratory Therapy Supplies MISC Decrease CPAP to 12-18 cmH2O Diagnosis Code(s)327.23.
 Please send order to [facility].  1 each  0 
   rosuvastatin (CRESTOR) 20 mg tablet Take 1 tablet by mouth nightly.  30 tablet  11 
   tacrolimus (PROGRAF) 0.5 mg capsule Take 1 capsule by mouth 2 times daily. With 1 mg to
 equal 1.5 mg bid  60 capsule  11 
   tacrolimus (PROGRAF) 1 mg capsule Take 1 mg by mouth twice daily with 0.5 mg to equal 1
 
.5 mg twice daily  60 capsule  11 
   valsartan (DIOVAN) 160 mg tablet Take 1 tablet by mouth Daily.  30 tablet  11 
   
 
 ALLERGIES
 No Known Allergies
 
 ROS
 Review of Systems 
 Constitutional: Positive for malaise/fatigue. 
 Respiratory: Positive for shortness of breath.  
 Cardiovascular: Positive for leg swelling (on the left). Negative for chest pain, palpitati
ons, orthopnea and PND. 
 Gastrointestinal: Negative for abdominal pain. 
 Musculoskeletal: Positive for back pain and joint pain. 
 Neurological: Negative for dizziness and loss of consciousness. 
 
 OBJECTIVE:  
 
 PHYSICAL EXAM
 BP 92/64 | Pulse 70 | Resp 20 | Ht 1.676 m (5' 6") | Wt 122.925 kg (271 lb) | BMI 43.76 kg/
m2
 Physical Exam 
 Constitutional: She is oriented to person, place, and time. She appears well-developed and 
well-nourished. 
      Adult female in no acute distress 
 Neck: Normal carotid pulses and no JVD (Difficult to fully evaluate due to body habitus) pr
esent. Carotid bruit is not present. 
 Cardiovascular: Normal rate, regular rhythm, S1 normal, S2 normal and intact distal pulses.
  PMI is not displaced.  Exam reveals no gallop and no friction rub.  
 Murmur heard.
  Holosystolic murmur is present with a grade of 1/6 
 Pulses:
      Carotid pulses are 2+ on the right side, and 2+ on the left side.
      Posterior tibial pulses are 1+ on the right side, and 1+ on the left side. 
 Pulmonary/Chest: Effort normal and breath sounds normal. No accessory muscle usage. No resp
iratory distress. She has no wheezes. She has no rhonchi. She has no rales. 
 Abdominal: Soft. Normal appearance and normal aorta. She exhibits no abdominal bruit. There
 is no hepatosplenomegaly. There is no tenderness. 
      Obese 
 Musculoskeletal: She exhibits edema (trace bilaterally at ankles, left worse than right, so
me venous stasis skin changes noted below knees ). 
 Neurological: She is alert and oriented to person, place, and time. Gait (using 4 wheeled w
alker) abnormal. 
 Skin: Skin is warm and dry. No cyanosis. Nails show no clubbing. 
 Psychiatric: She has a normal mood and affect. Her mood appears not anxious. She does not e
xhibit a depressed mood. 
 
 ECG: I personally reviewed EKG tracing from 14: Sinus rhythm with first degree AV bloc
k, rate of 60 beats per minute.  See scanned document for further interpretation. 
 
 LAB RESULTS: 
 LIPID
 Lab Results 
 Component Value Date 
  CHOL 162 2013 
  TRIG 225 2013 
  HDL 45 2013 
  LDL 72 2013 
  LDLEX 76 2014 
 
  HDLEX 52.6 2014 
  TRIGEX 186* 2014 
  CHOLEX 166 2014 
 
 CHEMISTRY
 Lab Results 
 Component Value Date 
   2014 
   2014 
  K 5.4 2014 
   2014 
  CO2 22 2014 
  CALCIUM 10.4 2014 
  ALKPHOS 100 2014 
  AST 20 7/10/2013 
  ASTEX 24 2014 
  ALT 14 7/10/2013 
  ALTEX 17 2014 
  BILITOT 0.6 2014 
  CREA 1.7 7/10/2013 
  BUN 40 2014 
  EGFR 31.0 7/10/2013 
  EGFREX 36* 2014 
  CREEX 1.44* 2014 
 
 HEMATOLOGY
 Lab Results 
 Component Value Date 
  WBC 4.6 7/10/2013 
  HGB 11.9 7/10/2013 
  HCT 35.7 7/10/2013 
  * 7/10/2013 
  HGBEX 13.5 2014 
 
 I reviewed records from Dr. Moser for office visit on 14.
 
 ASSESSMENT:  
 
 1. Coronary artery disease:
 A.  Status post CABG x 3 in  with LIMA to the LAD, SVG to the OM1 and OM2.
             B.  A persantine sestamibi stress test on 07 revealed a medium sized, mod
erate in severity fixed defect of the anterior wall, and a small sized mild in severity fixe
d defect of the inferior wall, LVEF by gated SPECT was 66%.
  C.  Bilateral heart catheterization on 05/03/10, shows severe two vessel coronary artery d
isease, a chronic occlusion through the proximal portion of the left anterior descending art
adele and a chronic occlusion through the proximal portion of the left circumflex artery, rani
ts: open left internal mammary artery graft to the mid left anterior descending artery, open
 saphenous vein grafts to the OM1 and OM2, mildly dilated left ventricular cavity with a nor
mal left systolic function, LVEF 70%, mild to moderate pulmonary hypertension and a normal c
ardiac output, coronary circulation is right dominant, normal system pressure.
  D.  Persantine nuclear medicine stress test 14 shows a normal myocardial perfusion im
aging study with normal left ventricular size, wall thickness, LVEF by gated SPECT of 78%.  
  E.  Today she denies any chest pain. There is no signs and symptoms of overt congestive he
art failure.  She is in a class II of [employer] [place] Heart Association functional class.  There is
 no fluid retention on physical examination. 
 
 2.  Right sided heart failure/ cor pulmonale / severe pulmonary hypertension:
             A. The echocardiogram from 02/19/10 revealed moderate bi-atrial dilatation and 
normal left ventricular size, wall thickness and motion, LVEF is 55-60%, dilated right ventr
icle with moderate hypo-kinesis, moderate tricuspid valve regurgitation, severe pulmonary hy
 
pertension with a peak systolic pressure of 80 mmHg, and a small pericardial effusion. 
  B. Echocardiogram from 4/15/14 showed Mild left atrial dilatation. Normal left ventricular
 size, wall thickness and motion. Preserved  left ventricular systolic function. LVEF is 70-
75%. Grade 1 left ventricular diastole dysfunction. Mild tricuspid valve regurgitation. Mild
 thickened trileaflet aortic valve with adequate opening. A mild aortic valve insufficiency.
 Normal right-sided pressure.
 
 3. Hypertension:
  A.  Today her blood pressure is well-controlled.
 
 4.  Hyperlipidemia.
 
 5.  Obstructive sleep apnea:
  A.  She uses a CPAP machine at night with 2 L of oxygen bled in.
 
 6.  Morbid obesity.
 
 7. Type II diabetes.
 
 8. Chronic kidney disease:
  A.  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 Followed by Dr. Moser.
 
 PLAN:  
 
 She will continue with her current medical regimen. 
 She will follow up in 1 year, or sooner with concerns. 
 
 I, BELKIS Arredondo, saw this patient under the direct supervision of Popeye Townsend MD
 
 Electronically signed by: 
 BELKIS Arredondo on 2014 at 13:18
 
 Portions of this chart may have been created with Dragon voice recognition software. Occasi
onal wrong-word or   sound-alike  substitutions may have occurred due to the inherent naqvi
itations of voice recognition software. Please read the chart carefully and recognize, using
 context, where these substitutions have occurred.Electronically signed by NATHANIEL Arredondo NP at 2014  5:14 PM PSTdocumented in this encounter
 
 Plan of Treatment
 
 
+--------+-----------+------------+----------------------+-------------------+
| Date   | Type      | Specialty  | Care Team            | Description       |
+--------+-----------+------------+----------------------+-------------------+
| / | Office    | Cardiology |   Tonia Wilson,    |                   |
2020   | Visit     |            | ARNP  [address]   |                   |
|        |           |            |   [place]  |                   |
|        |           |            | [address] [phone]  |                   |
|        |           |            |  [phone] (Fax)  |                   |
+--------+-----------+------------+----------------------+-------------------+
| / | Hospital  | Radiology  |   Popeye Townsend, |                   |
2020   | Encounter |            |  MD  [address] |                   |
|        |           |            |  [facility]   |                   |
|        |           |            | [place] [address]             |                   |
|        |           |            | [phone]         |                   |
|        |           |            | [phone] (Fax)   |                   |
+--------+-----------+------------+----------------------+-------------------+
| / | Surgery   | Radiology  |   Popeye Townsend, | CV EP PPM SYSTEM  |
2020   |           |            |  MD  [address] | IMPLANT           |
 
|        |           |            |  [place],   |                   |
|        |           |            | [place] [address]             |                   |
|        |           |            | [phone]         |                   |
|        |           |            | [phone] (Fax)   |                   |
+--------+-----------+------------+----------------------+-------------------+
| 02/10/ | Clinical  | Cardiology |                      |                   |
|    | Support   |            |                      |                   |
+--------+-----------+------------+----------------------+-------------------+
| / | Office    | Cardiology |   Tonia Wilson,    |                   |
|    | Visit     |            | ARNJESSICA  [address]   |                   |
|        |           |            | [place]  |                   |
|        |           |            | [address]  [phone]  |                   |
|        |           |            |  [phone] (Fax)  |                   |
+--------+-----------+------------+----------------------+-------------------+
| / | Off-Site  | Nephrology |   Shanti Marzena  |                   |
|    | Visit     |            | DO ETIENNE  [address]      |                   |
|        |           |            | [address]      |                   |
|        |           |            | [place]      |                   |
|        |           |            | [address]  [phone]  |                   |
|        |           |            |  [phone] (Fax)  |                   |
+--------+-----------+------------+----------------------+-------------------+
 documented as of this encounter
 
 Visit Diagnoses
 
 
+---------------------------------------------------------------------------------------+
| Diagnosis                                                                             |
+---------------------------------------------------------------------------------------+
|   Coronary artery disease - Primary  Coronary atherosclerosis of unspecified type of  |
| vessel, native or graft                                                               |
+---------------------------------------------------------------------------------------+
|   HTN (hypertension)  Unspecified essential hypertension                              |
+---------------------------------------------------------------------------------------+
|   Hyperlipidemia  Other and unspecified hyperlipidemia                                |
+---------------------------------------------------------------------------------------+
 documented in this encounter

## 2020-01-08 NOTE — XMS
Encounter Summary
  Created on: 2020
 
 Viviana Ricci
 External Reference #: [id number]
 : 47
 Sex: Female
 
 Demographics
 
 
+-----------------------+----------------------+
| Address               | [address]      |
|                       | [place]  [address] |
+-----------------------+----------------------+
| Home Phone            | [phone]      |
+-----------------------+----------------------+
| Preferred Language    | Unknown              |
+-----------------------+----------------------+
| Marital Status        | Single               |
+-----------------------+----------------------+
| Zoroastrianism Affiliation | [id number]                 |
+-----------------------+----------------------+
| Race                  | Unknown              |
+-----------------------+----------------------+
| Ethnic Group          | Unknown              |
+-----------------------+----------------------+
 
 
 Author
 
 
+--------------+--------------------------------------------+
| Author       | [facility]  |
|              | and [place]                                |
+--------------+--------------------------------------------+
| Organization | [facility]  |
|              | and [place]                                |
+--------------+--------------------------------------------+
| Address      | Unknown                                    |
+--------------+--------------------------------------------+
| Phone        | Unavailable                                |
+--------------+--------------------------------------------+
 
 
 
 Support
 
 
+----------------+--------------+---------------------+-----------------+
| Name           | Relationship | Address             | Phone           |
+----------------+--------------+---------------------+-----------------+
| Ada/Ed Radhames | ECON         | [place]              | [phone] |
|                |              | [place]  |                 |
|                |              |  [address]              |                 |
+----------------+--------------+---------------------+-----------------+
 
 
 
 
 Care Team Providers
 
 
+-----------------------+------+-------------+
| Care Team Member Name | Role | Phone       |
+-----------------------+------+-------------+
 PCP  | Unavailable |
+-----------------------+------+-------------+
 
 
 
 Encounter Details
 
 
+--------+----------+---------------------+----------------------+-------------+
| Date   | Type     | Department          | Care Team            | Description |
+--------+----------+---------------------+----------------------+-------------+
| 07/10/ | Abstract |   [facility]         |   Marzena Moser  |             |
|    |          | NEPHROLOGY  [address]   | M, DO  [address]      |             |
|        |          | [address]   | [address]      |             |
|        |          | [place], [place]     | [place]      |             |
|        |          | [address]          | [address]  [phone]  |             |
|        |          | [phone]        |  [phone] (Fax)  |             |
+--------+----------+---------------------+----------------------+-------------+
 
 
 
 Social History
 
 
+--------------+-------+-----------+--------+------+
| Tobacco Use  | Types | Packs/Day | Years  | Date |
|              |       |           | Used   |      |
+--------------+-------+-----------+--------+------+
| Never Smoker |       |           |        |      |
+--------------+-------+-----------+--------+------+
 
 
 
+---------------------+---+---+---+
| Smokeless Tobacco:  |   |   |   |
| Never Used          |   |   |   |
+---------------------+---+---+---+
 
 
 
+---------------------------------------------------------------+
| Comments: some second hand smoke exposure, but fairly minimal |
+---------------------------------------------------------------+
 
 
 
+-------------+-------------+---------+----------+
| Alcohol Use | Drinks/Week | oz/Week | Comments |
+-------------+-------------+---------+----------+
| No          |             |         |          |
+-------------+-------------+---------+----------+
 
 
 
 
+------------------+---------------+
| Sex Assigned at  | Date Recorded |
| Birth            |               |
+------------------+---------------+
| Not on file      |               |
+------------------+---------------+
 
 
 
+----------------+-------------+-------------+
| Job Start Date | Occupation  | Industry    |
+----------------+-------------+-------------+
| Not on file    | Not on file | Not on file |
+----------------+-------------+-------------+
 
 
 
+----------------+--------------+------------+
| Travel History | Travel Start | Travel End |
+----------------+--------------+------------+
 
 
 
+-------------------------------------+
| No recent travel history available. |
+-------------------------------------+
 documented as of this encounter
 
 Plan of Treatment
 
 
+--------+-----------+------------+----------------------+-------------------+
| Date   | Type      | Specialty  | Care Team            | Description       |
+--------+-----------+------------+----------------------+-------------------+
| / | Office    | Cardiology |   Tonia Wilson,    |                   |
|    | Visit     |            | ARNJESSICA  [address]   |                   |
|        |           |            | [place]  |                   |
|        |           |            | [address]  [phone]  |                   |
|        |           |            |  [phone] (Fax)  |                   |
+--------+-----------+------------+----------------------+-------------------+
| / | Hospital  | Radiology  |   Popeye Townsend, |                   |
|    | Encounter |            |  MD  [address] |                   |
|        |           |            |  St.  [place],   |                   |
|        |           |            | [place] [address]             |                   |
|        |           |            | [phone]         |                   |
|        |           |            | [phone] (Fax)   |                   |
+--------+-----------+------------+----------------------+-------------------+
| / | Surgery   | Radiology  |   Popeye Townsend, | CV EP PPM SYSTEM  |
|    |           |            |  MD  [address] | IMPLANT           |
|        |           |            |  St.  [place],   |                   |
|        |           |            | [place] [address]             |                   |
|        |           |            | [phone]         |                   |
|        |           |            | [phone] (Fax)   |                   |
+--------+-----------+------------+----------------------+-------------------+
| 02/10/ | Clinical  | Cardiology |                      |                   |
|    | Support   |            |                      |                   |
+--------+-----------+------------+----------------------+-------------------+
| / | Office    | Cardiology |   Tonia Wilson,    |                   |
|    | Visit     |            | ARNP  [address]   |                   |
 
|        |           |            | St  [place], [place]  |                   |
|        |           |            | [address]  [phone]  |                   |
|        |           |            |  [phone] (Fax)  |                   |
+--------+-----------+------------+----------------------+-------------------+
| / | Off-Site  | Nephrology |   Marzena Moser  |                   |
|    | Visit     |            | ETIENNE,   [address] [place] [address]      |                   |
|        |           |            | [address]      |                   |
|        |           |            | [place]      |                   |
|        |           |            | [address]  [phone]  |                   |
|        |           |            |  [phone] (Fax)  |                   |
+--------+-----------+------------+----------------------+-------------------+
 documented as of this encounter
 
 Procedures
 
 
+---------------------+--------+------------+----------------------+----------------------+
| Procedure Name      | Priori | Date/Time  | Associated Diagnosis | Comments             |
|                     | ty     |            |                      |                      |
+---------------------+--------+------------+----------------------+----------------------+
| TACROLIMUS ()  | Routin | 2014 |                      |   Results for this   |
| TROUGH              | e      |            |                      | procedure are in the |
|                     |        |            |                      |  results section.    |
+---------------------+--------+------------+----------------------+----------------------+
 documented in this encounter
 
 Results
 Tacrolimus () Level (2014)
 
+-------------+-------+-----------+-------------+--------------+
| Component   | Value | Ref Range | Performed   | Pathologist  |
|             |       |           | At          | Signature    |
+-------------+-------+-----------+-------------+--------------+
| Tacrolimus  | 5.5   |           | [facility]  |              |
| Level       |       |           | [facility]    |              |
|             |       |           | [facility]     |              |
|             |       |           | [facility] -    |              |
|             |       |           | LABORATORY  |              |
+-------------+-------+-----------+-------------+--------------+
 
 
 
+-----------------+
| Specimen        |
+-----------------+
| Blood specimen  |
| (specimen)      |
+-----------------+
 
 
 
+----------------------+--------------------+--------------------+----------------+
| Performing           | Address            | City/State/Zipcode | Phone Number   |
| Organization         |                    |                    |                |
+----------------------+--------------------+--------------------+----------------+
|   [facility]     |   [address] | [place]    |   [phone] |
| [facility]  |                    | [address]              |                |
| - LABORATORY         |                    |                    |                |
+----------------------+--------------------+--------------------+----------------+
|   [facility]     |   [address] | [place], [place]    |                |
 
| [facility]  |                    | [address]              |                |
| - LABORATORY         |                    |                    |                |
+----------------------+--------------------+--------------------+----------------+
 documented in this encounter
 
 Visit Diagnoses
 Not on filedocumented in this encounter

## 2020-01-08 NOTE — XMS
Encounter Summary
  Created on: 2020
 
 Vivinaa Ricci
 External Reference #: [id number]
 : 47
 Sex: Female
 
 Demographics
 
 
+-----------------------+----------------------+
| Address               | [address]      |
|                       | [place]  [address] |
+-----------------------+----------------------+
| Home Phone            | [phone]      |
+-----------------------+----------------------+
| Preferred Language    | Unknown              |
+-----------------------+----------------------+
| Marital Status        | Single               |
+-----------------------+----------------------+
| Adventist Affiliation | 1009                 |
+-----------------------+----------------------+
| Race                  | Unknown              |
+-----------------------+----------------------+
| Ethnic Group          | Unknown              |
+-----------------------+----------------------+
 
 
 Author
 
 
+--------------+--------------------------------------------+
| Author       | [facility]  |
|              | and [place]                                |
+--------------+--------------------------------------------+
| Organization | [facility]  |
|              | and [place]                                |
+--------------+--------------------------------------------+
| Address      | Unknown                                    |
+--------------+--------------------------------------------+
| Phone        | Unavailable                                |
+--------------+--------------------------------------------+
 
 
 
 Support
 
 
+----------------+--------------+---------------------+-----------------+
| Name           | Relationship | Address             | Phone           |
+----------------+--------------+---------------------+-----------------+
| Ada/Ed Radhames | ECON         | [place]              | [phone] |
|                |              | [place]  |                 |
|                |              |  [address]              |                 |
+----------------+--------------+---------------------+-----------------+
 
 
 
 
 Care Team Providers
 
 
+------------------------+------+-----------------+
| Care Team Member Name  | Role | Phone           |
+------------------------+------+-----------------+
| Marzena Moser DO | PCP  | [phone] |
+------------------------+------+-----------------+
 
 
 
 Reason for Visit
 
 
+-------------------+----------+
| Reason            | Comments |
+-------------------+----------+
| Medication Refill |          |
+-------------------+----------+
 
 
 
 Encounter Details
 
 
+--------+--------+---------------------+----------------------+-------------------+
| Date   | Type   | Department          | Care Team            | Description       |
+--------+--------+---------------------+----------------------+-------------------+
| / | Refill |   [place]         |   Marzena Moser  | Medication Refill |
| 2019   |        | NEPHROLOGY  [address]   | M, DO  [address]      |                   |
|        |        | [address]   | [address]      |                   |
|        |        | [place], [place]     | [place], [place]      |                   |
|        |        | [address]          | [address]  [phone]  |                   |
|        |        | [phone]        |  [phone] (Fax)  |                   |
+--------+--------+---------------------+----------------------+-------------------+
 
 
 
 Social History
 
 
+--------------+-------+-----------+--------+------+
| Tobacco Use  | Types | Packs/Day | Years  | Date |
|              |       |           | Used   |      |
+--------------+-------+-----------+--------+------+
| Never Smoker |       |           |        |      |
+--------------+-------+-----------+--------+------+
 
 
 
+---------------------+---+---+---+
| Smokeless Tobacco:  |   |   |   |
| Never Used          |   |   |   |
+---------------------+---+---+---+
 
 
 
+---------------------------------------------------------------+
| Comments: some second hand smoke exposure, but fairly minimal |
 
+---------------------------------------------------------------+
 
 
 
+-------------+-------------+---------+----------+
| Alcohol Use | Drinks/Week | oz/Week | Comments |
+-------------+-------------+---------+----------+
| No          |             |         |          |
+-------------+-------------+---------+----------+
 
 
 
+------------------+---------------+
| Sex Assigned at  | Date Recorded |
| Birth            |               |
+------------------+---------------+
| Not on file      |               |
+------------------+---------------+
 
 
 
+----------------+-------------+-------------+
| Job Start Date | Occupation  | Industry    |
+----------------+-------------+-------------+
| Not on file    | Not on file | Not on file |
+----------------+-------------+-------------+
 
 
 
+----------------+--------------+------------+
| Travel History | Travel Start | Travel End |
+----------------+--------------+------------+
 
 
 
+-------------------------------------+
| No recent travel history available. |
+-------------------------------------+
 documented as of this encounter
 
 Plan of Treatment
 
 
+--------+-----------+------------+----------------------+-------------------+
| Date   | Type      | Specialty  | Care Team            | Description       |
+--------+-----------+------------+----------------------+-------------------+
| / | Office    | Cardiology |   Tonia Wilson,    |                   |
|    | Visit     |            | ARNP  [address]   |                   |
|        |           |            | St [place], [place]  |                   |
|        |           |            | [address]  [phone]  |                   |
|        |           |            |  [phone] (Fax)  |                   |
+--------+-----------+------------+----------------------+-------------------+
| / | Hospital  | Radiology  |   Popeye Townsend, |                   |
|    | Encounter |            |  MD  [address] |                   |
|        |           |            |  [place],   |                   |
|        |           |            | [place] [address]             |                   |
|        |           |            | [phone]         |                   |
|        |           |            | [phone] (Fax)   |                   |
+--------+-----------+------------+----------------------+-------------------+
| / | Surgery   | Radiology  |   Popeye Townsend, | CV EP PPM SYSTEM  |
 
|    |           |            |  MD  [address] | IMPLANT           |
|        |           |            |  [place],   |                   |
|        |           |            | [place] [address]             |                   |
|        |           |            | [phone]         |                   |
|        |           |            | [phone] (Fax)   |                   |
+--------+-----------+------------+----------------------+-------------------+
| 02/10/ | Clinical  | Cardiology |                      |                   |
|    | Support   |            |                      |                   |
+--------+-----------+------------+----------------------+-------------------+
| / | Office    | Cardiology |   RakeshTonia andre,    |                   |
|    | Visit     |            | ARNP  [address]   |                   |
|        |           |            | [place]  |                   |
|        |           |            | [address] [phone]  |                   |
|        |           |            |  [phone] (Fax)  |                   |
+--------+-----------+------------+----------------------+-------------------+
| / | Off-Site  | Nephrology |   Marzena Moser  |                   |
|    | Visit     |            | DO ETIENNE  [address] [place] [address]      |                   |
|        |           |            | [address]      |                   |
|        |           |            | [place]      |                   |
|        |           |            | [address] [phone]  |                   |
|        |           |            |  [phone] (Fax)  |                   |
+--------+-----------+------------+----------------------+-------------------+
 documented as of this encounter
 
 Visit Diagnoses
 Not on filedocumented in this encounter

## 2020-01-08 NOTE — XMS
Encounter Summary
  Created on: 2020
 
 Viviana Ricci
 External Reference #: [id number]
 : 47
 Sex: Female
 
 Demographics
 
 
+-----------------------+----------------------+
| Address               | [address]      |
|                       | [place]  [address] |
+-----------------------+----------------------+
| Home Phone            | [phone]      |
+-----------------------+----------------------+
| Preferred Language    | Unknown              |
+-----------------------+----------------------+
| Marital Status        | Single               |
+-----------------------+----------------------+
| Roman Catholic Affiliation | [id number]                 |
+-----------------------+----------------------+
| Race                  | Unknown              |
+-----------------------+----------------------+
| Ethnic Group          | Unknown              |
+-----------------------+----------------------+
 
 
 Author
 
 
+--------------+--------------------------------------------+
| Author       | [facility]  |
|              | and [place]                                |
+--------------+--------------------------------------------+
| Organization | [facility]  |
|              | and [place]                                |
+--------------+--------------------------------------------+
| Address      | Unknown                                    |
+--------------+--------------------------------------------+
| Phone        | Unavailable                                |
+--------------+--------------------------------------------+
 
 
 
 Support
 
 
+----------------+--------------+---------------------+-----------------+
| Name           | Relationship | Address             | Phone           |
+----------------+--------------+---------------------+-----------------+
| Ada/Ed Radhames | ECON         | [address]              | [phone] |
|                |              | [place]  |                 |
|                |              |  [address]              |                 |
+----------------+--------------+---------------------+-----------------+
 
 
 
 
 Care Team Providers
 
 
+-----------------------+------+-------------+
| Care Team Member Name | Role | Phone       |
+-----------------------+------+-------------+
 PCP  | Unavailable |
+-----------------------+------+-------------+
 
 
 
 Reason for Visit
 
 
+--------+----------+
| Reason | Comments |
+--------+----------+
| Other  |          |
+--------+----------+
 
 
 
 Encounter Details
 
 
+--------+-----------+----------------------+----------------------+-------------+
| Date   | Type      | Department           | Care Team            | Description |
+--------+-----------+----------------------+----------------------+-------------+
| / | Telephone |   [facility]          |   Maricruz Flanagan, | Other       |
|    |           | PULMONARY  [address]     |  RN                  |             |
|        |           | [place]  [place], |                      |             |
|        |           |  [place] [address]       |                      |             |
|        |           | [phone]         |                      |             |
+--------+-----------+----------------------+----------------------+-------------+
 
 
 
 Social History
 
 
+--------------+-------+-----------+--------+------+
| Tobacco Use  | Types | Packs/Day | Years  | Date |
|              |       |           | Used   |      |
+--------------+-------+-----------+--------+------+
| Never Smoker |       |           |        |      |
+--------------+-------+-----------+--------+------+
 
 
 
+---------------------+---+---+---+
| Smokeless Tobacco:  |   |   |   |
| Never Used          |   |   |   |
+---------------------+---+---+---+
 
 
 
+---------------------------------------------------------------+
| Comments: some second hand smoke exposure, but fairly minimal |
+---------------------------------------------------------------+
 
 
 
 
+-------------+-------------+---------+----------+
| Alcohol Use | Drinks/Week | oz/Week | Comments |
+-------------+-------------+---------+----------+
| No          |             |         |          |
+-------------+-------------+---------+----------+
 
 
 
+------------------+---------------+
| Sex Assigned at  | Date Recorded |
| Birth            |               |
+------------------+---------------+
| Not on file      |               |
+------------------+---------------+
 
 
 
+----------------+-------------+-------------+
| Job Start Date | Occupation  | Industry    |
+----------------+-------------+-------------+
| Not on file    | Not on file | Not on file |
+----------------+-------------+-------------+
 
 
 
+----------------+--------------+------------+
| Travel History | Travel Start | Travel End |
+----------------+--------------+------------+
 
 
 
+-------------------------------------+
| No recent travel history available. |
+-------------------------------------+
 documented as of this encounter
 
 Plan of Treatment
 
 
+--------+-----------+------------+----------------------+-------------------+
| Date   | Type      | Specialty  | Care Team            | Description       |
+--------+-----------+------------+----------------------+-------------------+
| / | Office    | Cardiology |   Tonia Wilson,    |                   |
|    | Visit     |            | ARNP  [address]   |                   |
|        |           |            | St  [place] [facility], [place]  |                   |
|        |           |            | [address]  [phone]  |                   |
|        |           |            |  [phone] (Fax)  |                   |
+--------+-----------+------------+----------------------+-------------------+
| / | Hospital  | Radiology  |   Popeye Townsend, |                   |
|    | Encounter |            |  MD  [address] |                   |
|        |           |            |  St.  [place],   |                   |
|        |           |            | [place] [address]             |                   |
|        |           |            | [phone]         |                   |
|        |           |            | [phone] (Fax)   |                   |
+--------+-----------+------------+----------------------+-------------------+
| / | Surgery   | Radiology  |   Popeye Townsend, | CV EP PPM SYSTEM  |
|    |           |            |  MD  [address] | IMPLANT           |
 
|        |           |            |  St.  [place],   |                   |
|        |           |            | [place] [address]             |                   |
|        |           |            | [phone]         |                   |
|        |           |            | [phone] (Fax)   |                   |
+--------+-----------+------------+----------------------+-------------------+
| 02/10/ | Clinical  | Cardiology |                      |                   |
|    | Support   |            |                      |                   |
+--------+-----------+------------+----------------------+-------------------+
| / | Office    | Cardiology |   Hellberg, Tonia,    |                   |
|    | Visit     |            | [facility]  [address]   |                   |
|        |           |            | [place]  |                   |
|        |           |            | [address]  [phone]  |                   |
|        |           |            |  [phone] (Fax)  |                   |
+--------+-----------+------------+----------------------+-------------------+
| / | Off-Site  | Nephrology |   Marzena Moser  |                   |
|    | Visit     |            | DO ETIENNE  [address] [place] [address]      |                   |
|        |           |            | [address]      |                   |
|        |           |            | [place]      |                   |
|        |           |            | [address] [phone]  |                   |
|        |           |            |  [phone] (Fax)  |                   |
+--------+-----------+------------+----------------------+-------------------+
 documented as of this encounter
 
 Visit Diagnoses
 Not on filedocumented in this encounter

## 2020-01-08 NOTE — XMS
Encounter Summary
  Created on: 2020
 
 Viviana Ricci
 External Reference #: [id number]
 : 47
 Sex: Female
 
 Demographics
 
 
+-----------------------+----------------------+
| Address               | [address]      |
|                       | [place]  [address] |
+-----------------------+----------------------+
| Home Phone            | [phone]      |
+-----------------------+----------------------+
| Preferred Language    | Unknown              |
+-----------------------+----------------------+
| Marital Status        | Single               |
+-----------------------+----------------------+
| Pentecostal Affiliation | 1009                 |
+-----------------------+----------------------+
| Race                  | Unknown              |
+-----------------------+----------------------+
| Ethnic Group          | Unknown              |
+-----------------------+----------------------+
 
 
 Author
 
 
+--------------+--------------------------------------------+
| Author       | [facility]  |
|              | and [place]                                |
+--------------+--------------------------------------------+
| Organization | [facility]  |
|              | and [place]                                |
+--------------+--------------------------------------------+
| Address      | Unknown                                    |
+--------------+--------------------------------------------+
| Phone        | Unavailable                                |
+--------------+--------------------------------------------+
 
 
 
 Support
 
 
+----------------+--------------+---------------------+-----------------+
| Name           | Relationship | Address             | Phone           |
+----------------+--------------+---------------------+-----------------+
| Ada/Ed Radhames | ECON         | [address]              | [phone] |
|                |              | [place]  |                 |
|                |              |  [address]              |                 |
+----------------+--------------+---------------------+-----------------+
 
 
 
 
 Care Team Providers
 
 
+------------------------+------+-----------------+
| Care Team Member Name  | Role | Phone           |
+------------------------+------+-----------------+
| Marzena Moser DO | PCP  | [phone] |
+------------------------+------+-----------------+
 
 
 
 Encounter Details
 
 
+--------+-------------+---------------------+----------------------+----------------------+
| Date   | Type        | Department          | Care Team            | Description          |
+--------+-------------+---------------------+----------------------+----------------------+
| 10/19/ | Orders Only |   [facility] [place]         |   Marzena Moser  | Back pain,           |
| 2018   |             | NEPHROLOGY  [address]   | M, DO  [address]      | unspecified back     |
|        |             | [address]   | [address]      | location,            |
|        |             | [place], [place]     | [place]      | unspecified back     |
|        |             | [address]          | [address]  [phone]  | pain laterality,     |
|        |             | [phone]        |  [phone] (Fax)  | unspecified          |
|        |             |                     |                      | chronicity (Primary  |
|        |             |                     |                      | Dx)                  |
+--------+-------------+---------------------+----------------------+----------------------+
 
 
 
 Social History
 
 
+--------------+-------+-----------+--------+------+
| Tobacco Use  | Types | Packs/Day | Years  | Date |
|              |       |           | Used   |      |
+--------------+-------+-----------+--------+------+
| Never Smoker |       |           |        |      |
+--------------+-------+-----------+--------+------+
 
 
 
+---------------------+---+---+---+
| Smokeless Tobacco:  |   |   |   |
| Never Used          |   |   |   |
+---------------------+---+---+---+
 
 
 
+---------------------------------------------------------------+
| Comments: some second hand smoke exposure, but fairly minimal |
+---------------------------------------------------------------+
 
 
 
+-------------+-------------+---------+----------+
| Alcohol Use | Drinks/Week | oz/Week | Comments |
+-------------+-------------+---------+----------+
| No          |             |         |          |
+-------------+-------------+---------+----------+
 
 
 
 
+------------------+---------------+
| Sex Assigned at  | Date Recorded |
| Birth            |               |
+------------------+---------------+
| Not on file      |               |
+------------------+---------------+
 
 
 
+----------------+-------------+-------------+
| Job Start Date | Occupation  | Industry    |
+----------------+-------------+-------------+
| Not on file    | Not on file | Not on file |
+----------------+-------------+-------------+
 
 
 
+----------------+--------------+------------+
| Travel History | Travel Start | Travel End |
+----------------+--------------+------------+
 
 
 
+-------------------------------------+
| No recent travel history available. |
+-------------------------------------+
 documented as of this encounter
 
 Plan of Treatment
 
 
+--------+-----------+------------+----------------------+-------------------+
| Date   | Type      | Specialty  | Care Team            | Description       |
+--------+-----------+------------+----------------------+-------------------+
| / | Office    | Cardiology |   Tonia Wilson,    |                   |
|    | Visit     |            | ARNP  [address]   |                   |
|        |           |            | St  [place], [place]  |                   |
|        |           |            | [address]  [phone]  |                   |
|        |           |            |  [phone] (Fax)  |                   |
+--------+-----------+------------+----------------------+-------------------+
| / | Hospital  | Radiology  |   Popeye Townsend, |                   |
|    | Encounter |            |  MD  [address] |                   |
|        |           |            |  [place],   |                   |
|        |           |            | [place] [address]             |                   |
|        |           |            | [phone]         |                   |
|        |           |            | [phone] (Fax)   |                   |
+--------+-----------+------------+----------------------+-------------------+
| / | Surgery   | Radiology  |   Popeye Townsend, | CV EP PPM SYSTEM  |
|    |           |            |  MD  [address] | IMPLANT           |
|        |           |            |  St.  [place],   |                   |
|        |           |            | [place] [address]             |                   |
|        |           |            | [phone]         |                   |
|        |           |            | [phone] (Fax)   |                   |
+--------+-----------+------------+----------------------+-------------------+
| 02/10/ | Clinical  | Cardiology |                      |                   |
|    | Support   |            |                      |                   |
+--------+-----------+------------+----------------------+-------------------+
 
| / | Office    | Cardiology |   Tonia Wilson,    |                   |
|    | Visit     |            | [facility]  [address]   |                   |
|        |           |            | [place]  |                   |
|        |           |            | [address]  [phone]  |                   |
|        |           |            |  [phone] (Fax)  |                   |
+--------+-----------+------------+----------------------+-------------------+
| / | Off-Site  | Nephrology |   Marzena Moser  |                   |
|    | Visit     |            | DO ETIENNE  [address] [place] [address]      |                   |
|        |           |            | [address]      |                   |
|        |           |            | [place]      |                   |
|        |           |            | [address]  [phone]  |                   |
|        |           |            |  [phone] (Fax)  |                   |
+--------+-----------+------------+----------------------+-------------------+
 documented as of this encounter
 
 Visit Diagnoses
 
 
+----------------------------------------------------------------------------------------+
| Diagnosis                                                                              |
+----------------------------------------------------------------------------------------+
|   Back pain, unspecified back location, unspecified back pain laterality, unspecified  |
| chronicity - Primary                                                                   |
+----------------------------------------------------------------------------------------+
 documented in this encounter

## 2020-01-08 NOTE — XMS
Encounter Summary
  Created on: 2020
 
 Viviana Ricci
 External Reference #: [id number]
 : 47
 Sex: Female
 
 Demographics
 
 
+-----------------------+----------------------+
| Address               | [address]      |
|                       | [place]  [address] |
+-----------------------+----------------------+
| Home Phone            | [phone]      |
+-----------------------+----------------------+
| Preferred Language    | Unknown              |
+-----------------------+----------------------+
| Marital Status        | Single               |
+-----------------------+----------------------+
| Orthodoxy Affiliation | 1009                 |
+-----------------------+----------------------+
| Race                  | Unknown              |
+-----------------------+----------------------+
| Ethnic Group          | Unknown              |
+-----------------------+----------------------+
 
 
 Author
 
 
+--------------+--------------------------------------------+
| Author       | [facility]  |
|              | and [place]                                |
+--------------+--------------------------------------------+
| Organization | [facility]  |
|              | and [place]                                |
+--------------+--------------------------------------------+
| Address      | Unknown                                    |
+--------------+--------------------------------------------+
| Phone        | Unavailable                                |
+--------------+--------------------------------------------+
 
 
 
 Support
 
 
+----------------+--------------+---------------------+-----------------+
| Name           | Relationship | Address             | Phone           |
+----------------+--------------+---------------------+-----------------+
| Ada/Ed Radhames | ECON         | [address]              | [phone] |
|                |              | [place]  |                 |
|                |              |  [address]              |                 |
+----------------+--------------+---------------------+-----------------+
 
 
 
 
 Care Team Providers
 
 
+------------------------+------+-----------------+
| Care Team Member Name  | Role | Phone           |
+------------------------+------+-----------------+
| Marzena Moser DO | PCP  | [phone] |
+------------------------+------+-----------------+
 
 
 
 Encounter Details
 
 
+--------+-------------+---------------------+---------------------+-------------+
| Date   | Type        | Department          | Care Team           | Description |
+--------+-------------+---------------------+---------------------+-------------+
| / | Orders Only |   [facility]  [place]         |   Juju Glass,  |             |
| 2019   |             | NEPHROLOGY  [address]   | RN                  |             |
|        |             | [address]   |                     |             |
|        |             | [place]     |                     |             |
|        |             | [address]          |                     |             |
|        |             | [phone]        |                     |             |
+--------+-------------+---------------------+---------------------+-------------+
 
 
 
 Social History
 
 
+--------------+-------+-----------+--------+------+
| Tobacco Use  | Types | Packs/Day | Years  | Date |
|              |       |           | Used   |      |
+--------------+-------+-----------+--------+------+
| Never Smoker |       |           |        |      |
+--------------+-------+-----------+--------+------+
 
 
 
+---------------------+---+---+---+
| Smokeless Tobacco:  |   |   |   |
| Never Used          |   |   |   |
+---------------------+---+---+---+
 
 
 
+---------------------------------------------------------------+
| Comments: some second hand smoke exposure, but fairly minimal |
+---------------------------------------------------------------+
 
 
 
+-------------+-------------+---------+----------+
| Alcohol Use | Drinks/Week | oz/Week | Comments |
+-------------+-------------+---------+----------+
| No          |             |         |          |
+-------------+-------------+---------+----------+
 
 
 
 
+------------------+---------------+
| Sex Assigned at  | Date Recorded |
| Birth            |               |
+------------------+---------------+
| Not on file      |               |
+------------------+---------------+
 
 
 
+----------------+-------------+-------------+
| Job Start Date | Occupation  | Industry    |
+----------------+-------------+-------------+
| Not on file    | Not on file | Not on file |
+----------------+-------------+-------------+
 
 
 
+----------------+--------------+------------+
| Travel History | Travel Start | Travel End |
+----------------+--------------+------------+
 
 
 
+-------------------------------------+
| No recent travel history available. |
+-------------------------------------+
 documented as of this encounter
 
 Plan of Treatment
 
 
+--------+-----------+------------+----------------------+-------------------+
| Date   | Type      | Specialty  | Care Team            | Description       |
+--------+-----------+------------+----------------------+-------------------+
| / | Office    | Cardiology |   Tonia Wilson,    |                   |
|    | Visit     |            | ARNJESSICA  [address]   |                   |
|        |           |            | [place]  |                   |
|        |           |            | [address]  [phone]  |                   |
|        |           |            |  [phone] (Fax)  |                   |
+--------+-----------+------------+----------------------+-------------------+
| / | Hospital  | Radiology  |   Popeye Townesnd, |                   |
|    | Encounter |            |  MD  [address] |                   |
|        |           |            |  St.  [place],   |                   |
|        |           |            | [place] [address]             |                   |
|        |           |            | [phone]         |                   |
|        |           |            | [phone] (Fax)   |                   |
+--------+-----------+------------+----------------------+-------------------+
| / | Surgery   | Radiology  |   Popeye Townsend, | CV EP PPM SYSTEM  |
|    |           |            |  MD  [address] | IMPLANT           |
|        |           |            |  St.  [place],   |                   |
|        |           |            | [place] [address]             |                   |
|        |           |            | [phone]         |                   |
|        |           |            | [phone] (Fax)   |                   |
+--------+-----------+------------+----------------------+-------------------+
| 02/10/ | Clinical  | Cardiology |                      |                   |
|    | Support   |            |                      |                   |
+--------+-----------+------------+----------------------+-------------------+
| / | Office    | Cardiology |   Tonia Wilson,    |                   |
|    | Visit     |            | ARNP  [address]   |                   |
 
|        |           |            | St  [place]  |                   |
|        |           |            | [address]  [phone]  |                   |
|        |           |            |  [phone] (Fax)  |                   |
+--------+-----------+------------+----------------------+-------------------+
| / | Off-Site  | Nephrology |   Marzena Moser  |                   |
|    | Visit     |            | DO ETIENNE  [address] [place] [address]      |                   |
|        |           |            | [address]      |                   |
|        |           |            | [place]      |                   |
|        |           |            | [id number] [phone]  |                   |
|        |           |            |  [phone] (Fax)  |                   |
+--------+-----------+------------+----------------------+-------------------+
 documented as of this encounter
 
 Visit Diagnoses
 Not on filedocumented in this encounter

## 2020-01-08 NOTE — XMS
Encounter Summary
  Created on: 2020
 
 Viviana Ricci
 External Reference #: [id number]
 : 47
 Sex: Female
 
 Demographics
 
 
+-----------------------+----------------------+
| Address               | [address]      |
|                       | [place]  [address] |
+-----------------------+----------------------+
| Home Phone            | [phone]      |
+-----------------------+----------------------+
| Preferred Language    | Unknown              |
+-----------------------+----------------------+
| Marital Status        | Single               |
+-----------------------+----------------------+
| Mormonism Affiliation | 1009                 |
+-----------------------+----------------------+
| Race                  | Unknown              |
+-----------------------+----------------------+
| Ethnic Group          | Unknown              |
+-----------------------+----------------------+
 
 
 Author
 
 
+--------------+--------------------------------------------+
| Author       | [facility]  |
|              | and [place]                                |
+--------------+--------------------------------------------+
| Organization | [facility]  |
|              | and [place]                                |
+--------------+--------------------------------------------+
| Address      | Unknown                                    |
+--------------+--------------------------------------------+
| Phone        | Unavailable                                |
+--------------+--------------------------------------------+
 
 
 
 Support
 
 
+----------------+--------------+---------------------+-----------------+
| Name           | Relationship | Address             | Phone           |
+----------------+--------------+---------------------+-----------------+
| Daa/Ed Radhames | ECON         | [address]              | [phone] |
|                |              | [place]  |                 |
|                |              |  [address]              |                 |
+----------------+--------------+---------------------+-----------------+
 
 
 
 
 Care Team Providers
 
 
+-----------------------+------+-------------+
| Care Team Member Name | Role | Phone       |
+-----------------------+------+-------------+
 PCP  | Unavailable |
+-----------------------+------+-------------+
 
 
 
 Reason for Visit
 
 
+-------------+----------+
| Reason      | Comments |
+-------------+----------+
| Lab Results |          |
+-------------+----------+
 
 
 
 Encounter Details
 
 
+--------+-----------+---------------------+----------------------+-------------+
| Date   | Type      | Department          | Care Team            | Description |
+--------+-----------+---------------------+----------------------+-------------+
| / | Telephone |   [facility] [place]         |   Marzena Moser  | Lab Results |
| 2018   |           | NEPHROLOGY  [address]   | M, DO  [address]      |             |
|        |           | [address]   | [address]      |             |
|        |           | [place], [place]     | [place], [place]      |             |
|        |           | [address]          | [address]  [phone]  |             |
|        |           | [phone]        |  [phone] (Fax)  |             |
+--------+-----------+---------------------+----------------------+-------------+
 
 
 
 Social History
 
 
+--------------+-------+-----------+--------+------+
| Tobacco Use  | Types | Packs/Day | Years  | Date |
|              |       |           | Used   |      |
+--------------+-------+-----------+--------+------+
| Never Smoker |       |           |        |      |
+--------------+-------+-----------+--------+------+
 
 
 
+---------------------+---+---+---+
| Smokeless Tobacco:  |   |   |   |
| Never Used          |   |   |   |
+---------------------+---+---+---+
 
 
 
+---------------------------------------------------------------+
| Comments: some second hand smoke exposure, but fairly minimal |
 
+---------------------------------------------------------------+
 
 
 
+-------------+-------------+---------+----------+
| Alcohol Use | Drinks/Week | oz/Week | Comments |
+-------------+-------------+---------+----------+
| No          |             |         |          |
+-------------+-------------+---------+----------+
 
 
 
+------------------+---------------+
| Sex Assigned at  | Date Recorded |
| Birth            |               |
+------------------+---------------+
| Not on file      |               |
+------------------+---------------+
 
 
 
+----------------+-------------+-------------+
| Job Start Date | Occupation  | Industry    |
+----------------+-------------+-------------+
| Not on file    | Not on file | Not on file |
+----------------+-------------+-------------+
 
 
 
+----------------+--------------+------------+
| Travel History | Travel Start | Travel End |
+----------------+--------------+------------+
 
 
 
+-------------------------------------+
| No recent travel history available. |
+-------------------------------------+
 documented as of this encounter
 
 Plan of Treatment
 
 
+--------+-----------+------------+----------------------+-------------------+
| Date   | Type      | Specialty  | Care Team            | Description       |
+--------+-----------+------------+----------------------+-------------------+
| / | Office    | Cardiology |   Tonia Wilson,    |                   |
| 2020   | Visit     |            | BELKIS  [address]   |                   |
|        |           |            | St  [place], [place]  |                   |
|        |           |            | [address]  [phone]  |                   |
|        |           |            |  [phone] (Fax)  |                   |
+--------+-----------+------------+----------------------+-------------------+
| / | Hospital  | Radiology  |   Popeye Townsend, |                   |
|    | Encounter |            |  MD  [address] |                   |
|        |           |            |  St. [place],   |                   |
|        |           |            | [place] [address]             |                   |
|        |           |            | [phone]         |                   |
|        |           |            | [phone] (Fax)   |                   |
+--------+-----------+------------+----------------------+-------------------+
| / | Surgery   | Radiology  |   Popeye Townsend, | CV EP PPM SYSTEM  |
 
|    |           |            |  MD  [address] | IMPLANT           |
|        |           |            |  St. [place],   |                   |
|        |           |            | [place] [address]             |                   |
|        |           |            | [phone]         |                   |
|        |           |            | [phone] (Fax)   |                   |
+--------+-----------+------------+----------------------+-------------------+
| 02/10/ | Clinical  | Cardiology |                      |                   |
|    | Support   |            |                      |                   |
+--------+-----------+------------+----------------------+-------------------+
| / | Office    | Cardiology |   Tonia Wilson,    |                   |
|    | Visit     |            | [facility]  [address]   |                   |
|        |           |            |   [place]  |                   |
|        |           |            | [address]  [phone]  |                   |
|        |           |            |  [phone] (Fax)  |                   |
+--------+-----------+------------+----------------------+-------------------+
| / | Off-Site  | Nephrology |   Marzena Moser  |                   |
|    | Visit     |            | DO ETIENNE  [address] [place] [address]      |                   |
|        |           |            | [address]      |                   |
|        |           |            | [place]      |                   |
|        |           |            | [address]  [phone]  |                   |
|        |           |            |  [phone] (Fax)  |                   |
+--------+-----------+------------+----------------------+-------------------+
 documented as of this encounter
 
 Procedures
 
 
+------------------+--------+------------+----------------------+----------------------+
| Procedure Name   | Priori | Date/Time  | Associated Diagnosis | Comments             |
|                  | ty     |            |                      |                      |
+------------------+--------+------------+----------------------+----------------------+
| CULTURE, URINE,  | Routin | 2018 |                      |   Results for this   |
| REFLEXIVE        | e      |            |                      | procedure are in the |
|                  |        |            |                      |  results section.    |
+------------------+--------+------------+----------------------+----------------------+
 documented in this encounter
 
 Results
 Culture, Urine, Reflexive (2018)
 
+----------+
| Specimen |
+----------+
| Urine    |
+----------+
 
 
 
+---------------------------------------------------------------+--------------+
| Narrative                                                     | Performed At |
+---------------------------------------------------------------+--------------+
|   18- 100,000 CFU/mL Non-Lactose .     18-  |              |
| Non-Lactose  identified as pseudomonas aeruginosa.   |              |
+---------------------------------------------------------------+--------------+
 
 
 
+--------------+-----------------+--------+----------------+
| Organism     | Antibiotic      | Method | Susceptibility |
+--------------+-----------------+--------+----------------+
 
| Pseudomonas  | Piperacillin +  |        |   Intermediate |
| aeruginosa   | Tazobactam      |        |                |
+--------------+-----------------+--------+----------------+
| Pseudomonas  | Cefepime        |        |   Sensitive    |
| aeruginosa   |                 |        |                |
+--------------+-----------------+--------+----------------+
| Pseudomonas  | Imipenem        |        |   Sensitive    |
| aeruginosa   |                 |        |                |
+--------------+-----------------+--------+----------------+
| Pseudomonas  | Meropenem       |        |   Sensitive    |
| aeruginosa   |                 |        |                |
+--------------+-----------------+--------+----------------+
| Pseudomonas  | Gentamicin      |        |   Sensitive    |
| aeruginosa   |                 |        |                |
+--------------+-----------------+--------+----------------+
| Pseudomonas  | Ciprofloxacin   |        |   Sensitive    |
| aeruginosa   |                 |        |                |
+--------------+-----------------+--------+----------------+
| Pseudomonas  | Levofloxacin    |        |   Sensitive    |
| aeruginosa   |                 |        |                |
+--------------+-----------------+--------+----------------+
| Pseudomonas  | Cefazolin       |        |   Resistant    |
| aeruginosa   |                 |        |                |
+--------------+-----------------+--------+----------------+
 documented in this encounter
 
 Visit Diagnoses
 Not on filedocumented in this encounter

## 2020-01-08 NOTE — XMS
Encounter Summary
  Created on: 2020
 
 Viviana Ricci
 External Reference #: [id number]
 : 47
 Sex: Female
 
 Demographics
 
 
+-----------------------+----------------------+
| Address               | [address]      |
|                       | [place]  [address] |
+-----------------------+----------------------+
| Home Phone            | [phone]      |
+-----------------------+----------------------+
| Preferred Language    | Unknown              |
+-----------------------+----------------------+
| Marital Status        | Single               |
+-----------------------+----------------------+
| Restorationist Affiliation | [id number]                 |
+-----------------------+----------------------+
| Race                  | Unknown              |
+-----------------------+----------------------+
| Ethnic Group          | Unknown              |
+-----------------------+----------------------+
 
 
 Author
 
 
+--------------+--------------------------------------------+
| Author       | [facility]  |
|              | and [place]                                |
+--------------+--------------------------------------------+
| Organization | [facility]  |
|              | and [place]                                |
+--------------+--------------------------------------------+
| Address      | Unknown                                    |
+--------------+--------------------------------------------+
| Phone        | Unavailable                                |
+--------------+--------------------------------------------+
 
 
 
 Support
 
 
+----------------+--------------+---------------------+-----------------+
| Name           | Relationship | Address             | Phone           |
+----------------+--------------+---------------------+-----------------+
| Ada/Ed Radhames | ECON         | [place]              | [phone] |
|                |              | [place]  |                 |
|                |              |  [address]              |                 |
+----------------+--------------+---------------------+-----------------+
 
 
 
 
 Care Team Providers
 
 
+-----------------------+------+-------------+
| Care Team Member Name | Role | Phone       |
+-----------------------+------+-------------+
 PCP  | Unavailable |
+-----------------------+------+-------------+
 
 
 
 Reason for Visit
 
 
+-------------------+----------+
| Reason            | Comments |
+-------------------+----------+
| Medication Refill |          |
+-------------------+----------+
 
 
 
 Encounter Details
 
 
+--------+--------+---------------------+----------------------+-------------------+
| Date   | Type   | Department          | Care Team            | Description       |
+--------+--------+---------------------+----------------------+-------------------+
| / | Refill |   [facility]         |   Marzena Moser  | Medication Refill |
| 2017   |        | NEPHROLOGY  [address]   | M, DO  [address]      |                   |
|        |        | [address]   | [address]      |                   |
|        |        | [place], [place]     | [place], [place]      |                   |
|        |        | [address]          | [address]  [phone]  |                   |
|        |        | [phone]        |  [phone] (Fax)  |                   |
+--------+--------+---------------------+----------------------+-------------------+
 
 
 
 Social History
 
 
+--------------+-------+-----------+--------+------+
| Tobacco Use  | Types | Packs/Day | Years  | Date |
|              |       |           | Used   |      |
+--------------+-------+-----------+--------+------+
| Never Smoker |       |           |        |      |
+--------------+-------+-----------+--------+------+
 
 
 
+---------------------+---+---+---+
| Smokeless Tobacco:  |   |   |   |
| Never Used          |   |   |   |
+---------------------+---+---+---+
 
 
 
+---------------------------------------------------------------+
| Comments: some second hand smoke exposure, but fairly minimal |
 
+---------------------------------------------------------------+
 
 
 
+-------------+-------------+---------+----------+
| Alcohol Use | Drinks/Week | oz/Week | Comments |
+-------------+-------------+---------+----------+
| No          |             |         |          |
+-------------+-------------+---------+----------+
 
 
 
+------------------+---------------+
| Sex Assigned at  | Date Recorded |
| Birth            |               |
+------------------+---------------+
| Not on file      |               |
+------------------+---------------+
 
 
 
+----------------+-------------+-------------+
| Job Start Date | Occupation  | Industry    |
+----------------+-------------+-------------+
| Not on file    | Not on file | Not on file |
+----------------+-------------+-------------+
 
 
 
+----------------+--------------+------------+
| Travel History | Travel Start | Travel End |
+----------------+--------------+------------+
 
 
 
+-------------------------------------+
| No recent travel history available. |
+-------------------------------------+
 documented as of this encounter
 
 Plan of Treatment
 
 
+--------+-----------+------------+----------------------+-------------------+
| Date   | Type      | Specialty  | Care Team            | Description       |
+--------+-----------+------------+----------------------+-------------------+
| / | Office    | Cardiology |   HellalfredoTonia,    |                   |
|    | Visit     |            | ARNP  [address]   |                   |
|        |           |            | St  [place], [place]  |                   |
|        |           |            | [address]  [phone]  |                   |
|        |           |            |  [phone] (Fax)  |                   |
+--------+-----------+------------+----------------------+-------------------+
| / | Hospital  | Radiology  |   Popeye Townsend, |                   |
|    | Encounter |            |  MD  [address] |                   |
|        |           |            |  St.  [place],   |                   |
|        |           |            | [place] [address]             |                   |
|        |           |            | [phone]         |                   |
|        |           |            | [phone] (Fax)   |                   |
+--------+-----------+------------+----------------------+-------------------+
| / | Surgery   | Radiology  |   Popeye Townsend, | CV EP PPM SYSTEM  |
 
|    |           |            |  MD  [address] | IMPLANT           |
|        |           |            |  St.  [place],   |                   |
|        |           |            | [place] [address]             |                   |
|        |           |            | [phone]         |                   |
|        |           |            | [phone] (Fax)   |                   |
+--------+-----------+------------+----------------------+-------------------+
| 02/10/ | Clinical  | Cardiology |                      |                   |
|    | Support   |            |                      |                   |
+--------+-----------+------------+----------------------+-------------------+
| / | Office    | Cardiology |   Tonia Wilson,    |                   |
|    | Visit     |            | ARNP  [address]   |                   |
|        |           |            | [place]  |                   |
|        |           |            | [address]  [phone]  |                   |
|        |           |            |  [phone] (Fax)  |                   |
+--------+-----------+------------+----------------------+-------------------+
| / | Off-Site  | Nephrology |   Marzena Moser  |                   |
|    | Visit     |            | DO ETIENNE  [address] [place] [address]      |                   |
|        |           |            | [address]      |                   |
|        |           |            | [place]      |                   |
|        |           |            | [address]  [phone]  |                   |
|        |           |            |  [phone] (Fax)  |                   |
+--------+-----------+------------+----------------------+-------------------+
 documented as of this encounter
 
 Visit Diagnoses
 Not on filedocumented in this encounter

## 2020-01-08 NOTE — XMS
Encounter Summary
  Created on: 2020
 
 Viviana Ricci
 External Reference #: [id number]
 : 47
 Sex: Female
 
 Demographics
 
 
+-----------------------+----------------------+
| Address               | [address]      |
|                       | [place]  [address] |
+-----------------------+----------------------+
| Home Phone            | [phone]      |
+-----------------------+----------------------+
| Preferred Language    | Unknown              |
+-----------------------+----------------------+
| Marital Status        | Single               |
+-----------------------+----------------------+
| Yazidi Affiliation | [id number]                 |
+-----------------------+----------------------+
| Race                  | Unknown              |
+-----------------------+----------------------+
| Ethnic Group          | Unknown              |
+-----------------------+----------------------+
 
 
 Author
 
 
+--------------+--------------------------------------------+
| Author       | [facility]  |
|              | and [place]                                |
+--------------+--------------------------------------------+
| Organization | [facility]  |
|              | and [place]                                |
+--------------+--------------------------------------------+
| Address      | Unknown                                    |
+--------------+--------------------------------------------+
| Phone        | Unavailable                                |
+--------------+--------------------------------------------+
 
 
 
 Support
 
 
+----------------+--------------+---------------------+-----------------+
| Name           | Relationship | Address             | Phone           |
+----------------+--------------+---------------------+-----------------+
| Ada/Ed Radhames | ECON         | [address]              | [phone] |
|                |              | [place]  |                 |
|                |              |  [address]              |                 |
+----------------+--------------+---------------------+-----------------+
 
 
 
 
 Care Team Providers
 
 
+-----------------------+------+-------------+
| Care Team Member Name | Role | Phone       |
+-----------------------+------+-------------+
 PCP  | Unavailable |
+-----------------------+------+-------------+
 
 
 
 Encounter Details
 
 
+--------+-------------+---------------------+----------------------+-------------+
| Date   | Type        | Department          | Care Team            | Description |
+--------+-------------+---------------------+----------------------+-------------+
| / | Orders Only |   [facility]         |   Marzena Moser  |             |
|    |             | NEPHROLOGY  [address]   | M, DO  [address]      |             |
|        |             | [address]   | [address]      |             |
|        |             | [place]     | [place]      |             |
|        |             | [address]          | [address]  [phone]  |             |
|        |             | [phone]        |  [phone] (Fax)  |             |
+--------+-------------+---------------------+----------------------+-------------+
 
 
 
 Social History
 
 
+--------------+-------+-----------+--------+------+
| Tobacco Use  | Types | Packs/Day | Years  | Date |
|              |       |           | Used   |      |
+--------------+-------+-----------+--------+------+
| Never Smoker |       |           |        |      |
+--------------+-------+-----------+--------+------+
 
 
 
+---------------------+---+---+---+
| Smokeless Tobacco:  |   |   |   |
| Never Used          |   |   |   |
+---------------------+---+---+---+
 
 
 
+---------------------------------------------------------------+
| Comments: some second hand smoke exposure, but fairly minimal |
+---------------------------------------------------------------+
 
 
 
+-------------+-------------+---------+----------+
| Alcohol Use | Drinks/Week | oz/Week | Comments |
+-------------+-------------+---------+----------+
| No          |             |         |          |
+-------------+-------------+---------+----------+
 
 
 
 
+------------------+---------------+
| Sex Assigned at  | Date Recorded |
| Birth            |               |
+------------------+---------------+
| Not on file      |               |
+------------------+---------------+
 
 
 
+----------------+-------------+-------------+
| Job Start Date | Occupation  | Industry    |
+----------------+-------------+-------------+
| Not on file    | Not on file | Not on file |
+----------------+-------------+-------------+
 
 
 
+----------------+--------------+------------+
| Travel History | Travel Start | Travel End |
+----------------+--------------+------------+
 
 
 
+-------------------------------------+
| No recent travel history available. |
+-------------------------------------+
 documented as of this encounter
 
 Plan of Treatment
 
 
+--------+-----------+------------+----------------------+-------------------+
| Date   | Type      | Specialty  | Care Team            | Description       |
+--------+-----------+------------+----------------------+-------------------+
| / | Office    | Cardiology |   Tonia Wilson,    |                   |
|    | Visit     |            | [address]   |                   |
|        |           |            | [place]  |                   |
|        |           |            | [address] [phone]  |                   |
|        |           |            |  [phone] (Fax)  |                   |
+--------+-----------+------------+----------------------+-------------------+
| / | Hospital  | Radiology  |   Popeye Townsend, |                   |
|    | Encounter |            |  MD [address] |                   |
|        |           |            |  St.  [place],   |                   |
|        |           |            | [place] [address]             |                   |
|        |           |            | [phone]         |                   |
|        |           |            | [phone] (Fax)   |                   |
+--------+-----------+------------+----------------------+-------------------+
| / | Surgery   | Radiology  |   Popeye Townsend, | CV EP PPM SYSTEM  |
|    |           |            |  MD  [address] | IMPLANT           |
|        |           |            |  St.  [place],   |                   |
|        |           |            | [place] [address]             |                   |
|        |           |            | [phone]         |                   |
|        |           |            | [phone] (Fax)   |                   |
+--------+-----------+------------+----------------------+-------------------+
| 02/10/ | Clinical  | Cardiology |                      |                   |
|    | Support   |            |                      |                   |
+--------+-----------+------------+----------------------+-------------------+
| / | Office    | Cardiology |   Tonia Wilson,    |                   |
|    | Visit     |            | [address]   |                   |
 
|        |           |            | St  [place], [place]  |                   |
|        |           |            | [address] [phone]  |                   |
|        |           |            |  [phone] (Fax)  |                   |
+--------+-----------+------------+----------------------+-------------------+
| / | Off-Site  | Nephrology |   Marzena Moser  |                   |
|    | Visit     |            | DO [address]      |                   |
|        |           |            | [address]      |                   |
|        |           |            | [place]      |                   |
|        |           |            | [address] [phone]  |                   |
|        |           |            |  [phone] (Fax)  |                   |
+--------+-----------+------------+----------------------+-------------------+
 documented as of this encounter
 
 Visit Diagnoses
 Not on filedocumented in this encounter

## 2020-01-08 NOTE — XMS
Encounter Summary
  Created on: 2020
 
 Viviana Ricci
 External Reference #: [id number]
 : 47
 Sex: Female
 
 Demographics
 
 
+-----------------------+----------------------+
| Address               | [address]      |
|                       | [place]  [address] |
+-----------------------+----------------------+
| Home Phone            | [phone]      |
+-----------------------+----------------------+
| Preferred Language    | Unknown              |
+-----------------------+----------------------+
| Marital Status        | Single               |
+-----------------------+----------------------+
| [employer] Affiliation | [id number]                 |
+-----------------------+----------------------+
| Race                  | Unknown              |
+-----------------------+----------------------+
| Ethnic Group          | Unknown              |
+-----------------------+----------------------+
 
 
 Author
 
 
+--------------+--------------------------------------------+
| Author       | [facility]  |
|              | and [place]                                |
+--------------+--------------------------------------------+
| Organization | [facility]  |
|              | and [place]                                |
+--------------+--------------------------------------------+
| Address      | Unknown                                    |
+--------------+--------------------------------------------+
| Phone        | Unavailable                                |
+--------------+--------------------------------------------+
 
 
 
 Support
 
 
+----------------+--------------+---------------------+-----------------+
| Name           | Relationship | Address             | Phone           |
+----------------+--------------+---------------------+-----------------+
| Ada/Ed Radhames | ECON         | [place]              | [phone] |
|                |              | [place]  |                 |
|                |              |  [id number]              |                 |
+----------------+--------------+---------------------+-----------------+
 
 
 
 
 Care Team Providers
 
 
+-----------------------+------+-------------+
| Care Team Member Name | Role | Phone       |
+-----------------------+------+-------------+
 PCP  | Unavailable |
+-----------------------+------+-------------+
 
 
 
 Reason for Visit
 
 
+-------------------+----------+
| Reason            | Comments |
+-------------------+----------+
| Medication Refill |          |
+-------------------+----------+
 
 
 
 Encounter Details
 
 
+--------+--------+---------------------+----------------------+-------------------+
| Date   | Type   | Department          | Care Team            | Description       |
+--------+--------+---------------------+----------------------+-------------------+
| / | Refill |   [facility]         |   Marzena Moser  | Medication Refill |
| 2016   |        | NEPHROLOGY  [address]   | M, DO  [address]      |                   |
|        |        | [address]   | [address]      |                   |
|        |        | [place], [place]     | [place], [place]      |                   |
|        |        | [address]          | [address]  [phone]  |                   |
|        |        | [phone]        |  [phone] (Fax)  |                   |
+--------+--------+---------------------+----------------------+-------------------+
 
 
 
 Social History
 
 
+--------------+-------+-----------+--------+------+
| Tobacco Use  | Types | Packs/Day | Years  | Date |
|              |       |           | Used   |      |
+--------------+-------+-----------+--------+------+
| Never Smoker |       |           |        |      |
+--------------+-------+-----------+--------+------+
 
 
 
+---------------------+---+---+---+
| Smokeless Tobacco:  |   |   |   |
| Never Used          |   |   |   |
+---------------------+---+---+---+
 
 
 
+---------------------------------------------------------------+
| Comments: some second hand smoke exposure, but fairly minimal |
 
+---------------------------------------------------------------+
 
 
 
+-------------+-------------+---------+----------+
| Alcohol Use | Drinks/Week | oz/Week | Comments |
+-------------+-------------+---------+----------+
| No          |             |         |          |
+-------------+-------------+---------+----------+
 
 
 
+------------------+---------------+
| Sex Assigned at  | Date Recorded |
| Birth            |               |
+------------------+---------------+
| Not on file      |               |
+------------------+---------------+
 
 
 
+----------------+-------------+-------------+
| Job Start Date | Occupation  | Industry    |
+----------------+-------------+-------------+
| Not on file    | Not on file | Not on file |
+----------------+-------------+-------------+
 
 
 
+----------------+--------------+------------+
| Travel History | Travel Start | Travel End |
+----------------+--------------+------------+
 
 
 
+-------------------------------------+
| No recent travel history available. |
+-------------------------------------+
 documented as of this encounter
 
 Plan of Treatment
 
 
+--------+-----------+------------+----------------------+-------------------+
| Date   | Type      | Specialty  | Care Team            | Description       |
+--------+-----------+------------+----------------------+-------------------+
| / | Office    | Cardiology |   HellalfredoTonia,    |                   |
|    | Visit     |            | ARNP  [address]   |                   |
|        |           |            | St  [place], [place]  |                   |
|        |           |            | [address]  [phone]  |                   |
|        |           |            |  [phone] (Fax)  |                   |
+--------+-----------+------------+----------------------+-------------------+
| / | Hospital  | Radiology  |   Popeye Townsend, |                   |
|    | Encounter |            |  MD  [address] |                   |
|        |           |            |  St.  [place],   |                   |
|        |           |            | [place] [address]             |                   |
|        |           |            | [phone]         |                   |
|        |           |            | [phone] (Fax)   |                   |
+--------+-----------+------------+----------------------+-------------------+
| / | Surgery   | Radiology  |   Popeye Townsend, | CV EP PPM SYSTEM  |
 
|    |           |            |  MD  [address] | IMPLANT           |
|        |           |            |  St.  [place],   |                   |
|        |           |            | [place] [address]             |                   |
|        |           |            | [phone]         |                   |
|        |           |            | [phone] (Fax)   |                   |
+--------+-----------+------------+----------------------+-------------------+
| 02/10/ | Clinical  | Cardiology |                      |                   |
|    | Support   |            |                      |                   |
+--------+-----------+------------+----------------------+-------------------+
| / | Office    | Cardiology |   Tonia Wilson,    |                   |
|    | Visit     |            | ARNP  [address]   |                   |
|        |           |            | [place]  |                   |
|        |           |            | [address]  [phone]  |                   |
|        |           |            |  [phone] (Fax)  |                   |
+--------+-----------+------------+----------------------+-------------------+
| / | Off-Site  | Nephrology |   Marzena Moser  |                   |
|    | Visit     |            | DO ETIENNE  [address] [place] [address]      |                   |
|        |           |            | [address]      |                   |
|        |           |            | [place]      |                   |
|        |           |            | [address]  [phone]  |                   |
|        |           |            |  [phone] (Fax)  |                   |
+--------+-----------+------------+----------------------+-------------------+
 documented as of this encounter
 
 Visit Diagnoses
 Not on filedocumented in this encounter

## 2020-01-08 NOTE — XMS
Encounter Summary
  Created on: 2020
 
 Viviana Ricci
 External Reference #: [id number]
 : 47
 Sex: Female
 
 Demographics
 
 
+-----------------------+----------------------+
| Address               | [address]      |
|                       | [place]  [address] |
+-----------------------+----------------------+
| Home Phone            | [phone]      |
+-----------------------+----------------------+
| Preferred Language    | Unknown              |
+-----------------------+----------------------+
| Marital Status        | Single               |
+-----------------------+----------------------+
| [employer] Affiliation | 1009                 |
+-----------------------+----------------------+
| Race                  | Unknown              |
+-----------------------+----------------------+
| Ethnic Group          | Unknown              |
+-----------------------+----------------------+
 
 
 Author
 
 
+--------------+--------------------------------------------+
| Author       | [facility] [place]  |
|              | and [place]                                |
+--------------+--------------------------------------------+
| Organization | [facility] [place]  |
|              | and [place]                                |
+--------------+--------------------------------------------+
| Address      | Unknown                                    |
+--------------+--------------------------------------------+
| Phone        | Unavailable                                |
+--------------+--------------------------------------------+
 
 
 
 Support
 
 
+----------------+--------------+---------------------+-----------------+
| Name           | Relationship | Address             | Phone           |
+----------------+--------------+---------------------+-----------------+
| Ada/Ed Radhames | ECON         | [place]              | [phone] |
|                |              | [place]  |                 |
|                |              |  [address]              |                 |
+----------------+--------------+---------------------+-----------------+
 
 
 
 
 Care Team Providers
 
 
+-----------------------+------+-------------+
| Care Team Member Name | Role | Phone       |
+-----------------------+------+-------------+
 PCP  | Unavailable |
+-----------------------+------+-------------+
 
 
 
 Reason for Visit
 
 
+--------+----------+
| Reason | Comments |
+--------+----------+
| Pain   |          |
+--------+----------+
 
 
 
 Encounter Details
 
 
+--------+-----------+---------------------+----------------------+-------------+
| Date   | Type      | Department          | Care Team            | Description |
+--------+-----------+---------------------+----------------------+-------------+
| / | Telephone |   [facility] [place]         |   Marzena Moser  | Pain        |
|    |           | NEPHROLOGY  [address]   | M, DO  [address]      |             |
|        |           | [address]   | [address]      |             |
|        |           | [place], [place]     | [place], [place]      |             |
|        |           | [address]          | [address]  [phone]  |             |
|        |           | [phone]        |  [phone] (Fax)  |             |
+--------+-----------+---------------------+----------------------+-------------+
 
 
 
 Social History
 
 
+--------------+-------+-----------+--------+------+
| Tobacco Use  | Types | Packs/Day | Years  | Date |
|              |       |           | Used   |      |
+--------------+-------+-----------+--------+------+
| Never Smoker |       |           |        |      |
+--------------+-------+-----------+--------+------+
 
 
 
+---------------------+---+---+---+
| Smokeless Tobacco:  |   |   |   |
| Never Used          |   |   |   |
+---------------------+---+---+---+
 
 
 
+---------------------------------------------------------------+
| Comments: some second hand smoke exposure, but fairly minimal |
 
+---------------------------------------------------------------+
 
 
 
+-------------+-------------+---------+----------+
| Alcohol Use | Drinks/Week | oz/Week | Comments |
+-------------+-------------+---------+----------+
| No          |             |         |          |
+-------------+-------------+---------+----------+
 
 
 
+------------------+---------------+
| Sex Assigned at  | Date Recorded |
| Birth            |               |
+------------------+---------------+
| Not on file      |               |
+------------------+---------------+
 
 
 
+----------------+-------------+-------------+
| Job Start Date | Occupation  | Industry    |
+----------------+-------------+-------------+
| Not on file    | Not on file | Not on file |
+----------------+-------------+-------------+
 
 
 
+----------------+--------------+------------+
| Travel History | Travel Start | Travel End |
+----------------+--------------+------------+
 
 
 
+-------------------------------------+
| No recent travel history available. |
+-------------------------------------+
 documented as of this encounter
 
 Plan of Treatment
 
 
+--------+-----------+------------+----------------------+-------------------+
| Date   | Type      | Specialty  | Care Team            | Description       |
+--------+-----------+------------+----------------------+-------------------+
| / | Office    | Cardiology |   Tonia Wilson,    |                   |
|    | Visit     |            | BELKIS  [address]   |                   |
|        |           |            | St  [place]  |                   |
|        |           |            | [address]  [phone]  |                   |
|        |           |            |  [phone] (Fax)  |                   |
+--------+-----------+------------+----------------------+-------------------+
| / | Hospital  | Radiology  |   Popeye Townsend, |                   |
|    | Encounter |            |  MD  [address] |                   |
|        |           |            |  St.  [place],   |                   |
|        |           |            | [place] [address]             |                   |
|        |           |            | [phone]         |                   |
|        |           |            | [phone] (Fax)   |                   |
+--------+-----------+------------+----------------------+-------------------+
| / | Surgery   | Radiology  |   Popeye Townsend, | CV EP PPM SYSTEM  |
 
|    |           |            |  MD  [address] | IMPLANT           |
|        |           |            |  St.  [place],   |                   |
|        |           |            | [place] [address]             |                   |
|        |           |            | [phone]         |                   |
|        |           |            | [phone] (Fax)   |                   |
+--------+-----------+------------+----------------------+-------------------+
| 02/10/ | Clinical  | Cardiology |                      |                   |
|    | Support   |            |                      |                   |
+--------+-----------+------------+----------------------+-------------------+
| / | Office    | Cardiology |   Tonia Wilson,    |                   |
|    | Visit     |            | BLEKIS  [address]   |                   |
|        |           |            | [place]  |                   |
|        |           |            | [address]  [phone]  |                   |
|        |           |            |  [phone] (Fax)  |                   |
+--------+-----------+------------+----------------------+-------------------+
| / | Off-Site  | Nephrology |   Marzena Moser  |                   |
|    | Visit     |            | DO ETIENNE  [address] [place] [address]      |                   |
|        |           |            | [address]      |                   |
|        |           |            | [place]      |                   |
|        |           |            | [address]  [phone]  |                   |
|        |           |            |  [phone] (Fax)  |                   |
+--------+-----------+------------+----------------------+-------------------+
 documented as of this encounter
 
 Visit Diagnoses
 Not on filedocumented in this encounter

## 2020-01-08 NOTE — XMS
Encounter Summary
  Created on: 2020
 
 Viviana Ricci
 External Reference #: [id number]
 : 47
 Sex: Female
 
 Demographics
 
 
+-----------------------+----------------------+
| Address               | [address]      |
|                       | [place]  [address] |
+-----------------------+----------------------+
| Home Phone            | [phone]      |
+-----------------------+----------------------+
| Preferred Language    | Unknown              |
+-----------------------+----------------------+
| Marital Status        | Single               |
+-----------------------+----------------------+
| Episcopal Affiliation | [id number]                 |
+-----------------------+----------------------+
| Race                  | Unknown              |
+-----------------------+----------------------+
| Ethnic Group          | Unknown              |
+-----------------------+----------------------+
 
 
 Author
 
 
+--------------+--------------------------------------------+
| Author       | [facility]  |
|              | and [place]                                |
+--------------+--------------------------------------------+
| Organization | [facility]  |
|              | and [place]                                |
+--------------+--------------------------------------------+
| Address      | Unknown                                    |
+--------------+--------------------------------------------+
| Phone        | Unavailable                                |
+--------------+--------------------------------------------+
 
 
 
 Support
 
 
+----------------+--------------+---------------------+-----------------+
| Name           | Relationship | Address             | Phone           |
+----------------+--------------+---------------------+-----------------+
| Ada/Ed Radhames | ECON         | [address]              | [phone] |
|                |              | [place]  |                 |
|                |              |  [address]              |                 |
+----------------+--------------+---------------------+-----------------+
 
 
 
 
 Care Team Providers
 
 
+-----------------------+------+-------------+
| Care Team Member Name | Role | Phone       |
+-----------------------+------+-------------+
 PCP  | Unavailable |
+-----------------------+------+-------------+
 
 
 
 Encounter Details
 
 
+--------+----------+---------------------+----------------------+-------------+
| Date   | Type     | Department          | Care Team            | Description |
+--------+----------+---------------------+----------------------+-------------+
| / | Abstract |   [facility] [place]         |   Marzena Moser  |             |
|    |          | NEPHROLOGY  [address]   | M, DO  [address]      |             |
|        |          | [address]   | [address]      |             |
|        |          | [place], [place]     | [place]      |             |
|        |          | [address]          | [address]  [phone]  |             |
|        |          | [phone]        |  [phone] (Fax)  |             |
+--------+----------+---------------------+----------------------+-------------+
 
 
 
 Social History
 
 
+--------------+-------+-----------+--------+------+
| Tobacco Use  | Types | Packs/Day | Years  | Date |
|              |       |           | Used   |      |
+--------------+-------+-----------+--------+------+
| Never Smoker |       |           |        |      |
+--------------+-------+-----------+--------+------+
 
 
 
+---------------------+---+---+---+
| Smokeless Tobacco:  |   |   |   |
| Never Used          |   |   |   |
+---------------------+---+---+---+
 
 
 
+---------------------------------------------------------------+
| Comments: some second hand smoke exposure, but fairly minimal |
+---------------------------------------------------------------+
 
 
 
+-------------+-------------+---------+----------+
| Alcohol Use | Drinks/Week | oz/Week | Comments |
+-------------+-------------+---------+----------+
| No          |             |         |          |
+-------------+-------------+---------+----------+
 
 
 
 
+------------------+---------------+
| Sex Assigned at  | Date Recorded |
| Birth            |               |
+------------------+---------------+
| Not on file      |               |
+------------------+---------------+
 
 
 
+----------------+-------------+-------------+
| Job Start Date | Occupation  | Industry    |
+----------------+-------------+-------------+
| Not on file    | Not on file | Not on file |
+----------------+-------------+-------------+
 
 
 
+----------------+--------------+------------+
| Travel History | Travel Start | Travel End |
+----------------+--------------+------------+
 
 
 
+-------------------------------------+
| No recent travel history available. |
+-------------------------------------+
 documented as of this encounter
 
 Plan of Treatment
 
 
+--------+-----------+------------+----------------------+-------------------+
| Date   | Type      | Specialty  | Care Team            | Description       |
+--------+-----------+------------+----------------------+-------------------+
| / | Office    | Cardiology |   Tonia Wilson,    |                   |
|    | Visit     |            | ARNJESSICA  [address]   |                   |
|        |           |            | [place]  |                   |
|        |           |            | [address]  [phone]  |                   |
|        |           |            |  [phone] (Fax)  |                   |
+--------+-----------+------------+----------------------+-------------------+
| / | Hospital  | Radiology  |   Popeye Townsend, |                   |
|    | Encounter |            |  MD  [address] |                   |
|        |           |            |  St.  [place],   |                   |
|        |           |            | [place] [address]             |                   |
|        |           |            | [phone]         |                   |
|        |           |            | [phone] (Fax)   |                   |
+--------+-----------+------------+----------------------+-------------------+
| / | Surgery   | Radiology  |   Popeye Townsend, | CV EP PPM SYSTEM  |
|    |           |            |  MD  [address] | IMPLANT           |
|        |           |            |  St.  [place],   |                   |
|        |           |            | [place] [address]             |                   |
|        |           |            | [phone]         |                   |
|        |           |            | [phone] (Fax)   |                   |
+--------+-----------+------------+----------------------+-------------------+
| 02/10/ | Clinical  | Cardiology |                      |                   |
|    | Support   |            |                      |                   |
+--------+-----------+------------+----------------------+-------------------+
| / | Office    | Cardiology |   Tonia Wilson,    |                   |
|    | Visit     |            | ARNP  [address]   |                   |
 
|        |           |            | St  [place], [place]  |                   |
|        |           |            | [address]  [phone]  |                   |
|        |           |            |  [phone] (Fax)  |                   |
+--------+-----------+------------+----------------------+-------------------+
| / | Off-Site  | Nephrology |   Marzena Moser  |                   |
|    | Visit     |            | DO ETIENNE  [address] [place] [address]      |                   |
|        |           |            | [address]      |                   |
|        |           |            | [place]      |                   |
|        |           |            | [address]  [phone]  |                   |
|        |           |            |  [phone] (Fax)  |                   |
+--------+-----------+------------+----------------------+-------------------+
 documented as of this encounter
 
 Procedures
 
 
+--------------------+--------+------------+----------------------+----------------------+
| Procedure Name     | Priori | Date/Time  | Associated Diagnosis | Comments             |
|                    | ty     |            |                      |                      |
+--------------------+--------+------------+----------------------+----------------------+
| EXTERNAL LAB:      | Routin | 2018 |                      |   Results for this   |
| TACROLIMUS LEVEL,  | e      |            |                      | procedure are in the |
| [facility]               |        |            |                      |  results section.    |
+--------------------+--------+------------+----------------------+----------------------+
 documented in this encounter
 
 Results
 External Lab: Tacrolimus Level, [facility] (2018)
 
+-------------+-------+-----------+------------+--------------+
| Component   | Value | Ref Range | Performed  | Pathologist  |
|             |       |           | At         | Signature    |
+-------------+-------+-----------+------------+--------------+
| Tacrolimus, | 5.6   |           |            |              |
|  [facility],      |       |           |            |              |
| External    |       |           |            |              |
+-------------+-------+-----------+------------+--------------+
 
 
 
+----------+
| Specimen |
+----------+
|          |
+----------+
 documented in this encounter
 
 Visit Diagnoses
 Not on filedocumented in this encounter

## 2020-01-08 NOTE — XMS
Encounter Summary
  Created on: 2020
 
 Viviana Ricci
 External Reference #: [id number]
 : 47
 Sex: Female
 
 Demographics
 
 
+-----------------------+----------------------+
| Address               | [address]      |
|                       | [place]  [address] |
+-----------------------+----------------------+
| Home Phone            | [phone]      |
+-----------------------+----------------------+
| Preferred Language    | Unknown              |
+-----------------------+----------------------+
| Marital Status        | Single               |
+-----------------------+----------------------+
| Quaker Affiliation | 1009                 |
+-----------------------+----------------------+
| Race                  | Unknown              |
+-----------------------+----------------------+
| Ethnic Group          | Unknown              |
+-----------------------+----------------------+
 
 
 Author
 
 
+--------------+--------------------------------------------+
| Author       | [facility]  |
|              | and [place]                                |
+--------------+--------------------------------------------+
| Organization | [facility]  |
|              | and [place]                                |
+--------------+--------------------------------------------+
| Address      | Unknown                                    |
+--------------+--------------------------------------------+
| Phone        | Unavailable                                |
+--------------+--------------------------------------------+
 
 
 
 Support
 
 
+----------------+--------------+---------------------+-----------------+
| Name           | Relationship | Address             | Phone           |
+----------------+--------------+---------------------+-----------------+
| Ada/Ed Radhames | ECON         | [address]              | [phone] |
|                |              | [place]  |                 |
|                |              |  [address]              |                 |
+----------------+--------------+---------------------+-----------------+
 
 
 
 
 Care Team Providers
 
 
+-----------------------+------+-------------+
| Care Team Member Name | Role | Phone       |
+-----------------------+------+-------------+
 PCP  | Unavailable |
+-----------------------+------+-------------+
 
 
 
 Reason for Visit
 
 
+-------------------+----------+
| Reason            | Comments |
+-------------------+----------+
| Medication Refill |          |
+-------------------+----------+
 
 
 
 Encounter Details
 
 
+--------+--------+---------------------+----------------------+-------------------+
| Date   | Type   | Department          | Care Team            | Description       |
+--------+--------+---------------------+----------------------+-------------------+
| / | Refill |   [place]         |   Marzena Moser  | Medication Refill |
|    |        | NEPHROLOGY  [address]   | M, DO  [address]      |                   |
|        |        | [address]   | [address]      |                   |
|        |        | [place], [place]     | [place], [place]      |                   |
|        |        | [address]          | [address]  [phone]  |                   |
|        |        | [phone]        |  [phone] (Fax)  |                   |
+--------+--------+---------------------+----------------------+-------------------+
 
 
 
 Social History
 
 
+--------------+-------+-----------+--------+------+
| Tobacco Use  | Types | Packs/Day | Years  | Date |
|              |       |           | Used   |      |
+--------------+-------+-----------+--------+------+
| Never Smoker |       |           |        |      |
+--------------+-------+-----------+--------+------+
 
 
 
+---------------------+---+---+---+
| Smokeless Tobacco:  |   |   |   |
| Never Used          |   |   |   |
+---------------------+---+---+---+
 
 
 
+---------------------------------------------------------------+
| Comments: some second hand smoke exposure, but fairly minimal |
 
+---------------------------------------------------------------+
 
 
 
+-------------+-------------+---------+----------+
| Alcohol Use | Drinks/Week | oz/Week | Comments |
+-------------+-------------+---------+----------+
| No          |             |         |          |
+-------------+-------------+---------+----------+
 
 
 
+------------------+---------------+
| Sex Assigned at  | Date Recorded |
| Birth            |               |
+------------------+---------------+
| Not on file      |               |
+------------------+---------------+
 
 
 
+----------------+-------------+-------------+
| Job Start Date | Occupation  | Industry    |
+----------------+-------------+-------------+
| Not on file    | Not on file | Not on file |
+----------------+-------------+-------------+
 
 
 
+----------------+--------------+------------+
| Travel History | Travel Start | Travel End |
+----------------+--------------+------------+
 
 
 
+-------------------------------------+
| No recent travel history available. |
+-------------------------------------+
 documented as of this encounter
 
 Plan of Treatment
 
 
+--------+-----------+------------+----------------------+-------------------+
| Date   | Type      | Specialty  | Care Team            | Description       |
+--------+-----------+------------+----------------------+-------------------+
| / | Office    | Cardiology |   HellalfredoTonia,    |                   |
|    | Visit     |            | ARNP  [address]   |                   |
|        |           |            | St  [place], [place]  |                   |
|        |           |            | [address]  [phone]  |                   |
|        |           |            |  [phone] (Fax)  |                   |
+--------+-----------+------------+----------------------+-------------------+
| / | Hospital  | Radiology  |   Popeye Townsend, |                   |
|    | Encounter |            |  MD  [address] |                   |
|        |           |            |  St.  [place],   |                   |
|        |           |            | [place] [address]             |                   |
|        |           |            | [phone]         |                   |
|        |           |            | [phone] (Fax)   |                   |
+--------+-----------+------------+----------------------+-------------------+
| / | Surgery   | Radiology  |   Popeye Townsend, | CV EP PPM SYSTEM  |
 
|    |           |            |  MD  [address] | IMPLANT           |
|        |           |            |  St.  [place],   |                   |
|        |           |            | [place] [address]             |                   |
|        |           |            | [phone]         |                   |
|        |           |            | [phone] (Fax)   |                   |
+--------+-----------+------------+----------------------+-------------------+
| 02/10/ | Clinical  | Cardiology |                      |                   |
|    | Support   |            |                      |                   |
+--------+-----------+------------+----------------------+-------------------+
| / | Office    | Cardiology |   Tonia Wilson,    |                   |
|    | Visit     |            | ARNP  [address]   |                   |
|        |           |            | [place]  |                   |
|        |           |            | [address]  [phone]  |                   |
|        |           |            |  [phone] (Fax)  |                   |
+--------+-----------+------------+----------------------+-------------------+
| / | Off-Site  | Nephrology |   Marzena Moser  |                   |
|    | Visit     |            | DO ETIENNE  [address] [place] [address]      |                   |
|        |           |            | [address]      |                   |
|        |           |            | [place]      |                   |
|        |           |            | [address]  [phone]  |                   |
|        |           |            |  [phone] (Fax)  |                   |
+--------+-----------+------------+----------------------+-------------------+
 documented as of this encounter
 
 Visit Diagnoses
 Not on filedocumented in this encounter

## 2020-01-08 NOTE — XMS
Encounter Summary
  Created on: 2020
 
 Viviana Ricci
 External Reference #: [id number]
 : 47
 Sex: Female
 
 Demographics
 
 
+-----------------------+----------------------+
| Address               | [address]      |
|                       | [place]  [address] |
+-----------------------+----------------------+
| Home Phone            | [phone]      |
+-----------------------+----------------------+
| Preferred Language    | Unknown              |
+-----------------------+----------------------+
| Marital Status        | Single               |
+-----------------------+----------------------+
| Congregation Affiliation | 1009                 |
+-----------------------+----------------------+
| Race                  | Unknown              |
+-----------------------+----------------------+
| Ethnic Group          | Unknown              |
+-----------------------+----------------------+
 
 
 Author
 
 
+--------------+--------------------------------------------+
| Author       | [facility]  |
|              | and [facility]                                |
+--------------+--------------------------------------------+
| Organization | [facility]  |
|              | and [facility]                                |
+--------------+--------------------------------------------+
| Address      | Unknown                                    |
+--------------+--------------------------------------------+
| Phone        | Unavailable                                |
+--------------+--------------------------------------------+
 
 
 
 Support
 
 
+----------------+--------------+---------------------+-----------------+
| Name           | Relationship | Address             | Phone           |
+----------------+--------------+---------------------+-----------------+
| Ada/Ed Radhames | ECON         | [place]              | [phone] |
|                |              | [place], [place]  |                 |
|                |              |  [address]              |                 |
+----------------+--------------+---------------------+-----------------+
 
 
 
 
 Care Team Providers
 
 
+-----------------------+------+-------------+
| Care Team Member Name | Role | Phone       |
+-----------------------+------+-------------+
 PCP  | Unavailable |
+-----------------------+------+-------------+
 
 
 
 Encounter Details
 
 
+--------+-----------+----------------------+-----------+-------------+
| Date   | Type      | Department           | Care Team | Description |
+--------+-----------+----------------------+-----------+-------------+
| / | [facility]  |   [facility] |           |             |
|    | Encounter |  MED CTR GENERIC OP  |           |             |
|        |           | CONV DEPT  [address]     |           |             |
|        |           | [address]  [place], |           |             |
|        |           |  [place] [address]       |           |             |
|        |           | [phone]         |           |             |
+--------+-----------+----------------------+-----------+-------------+
 
 
 
 Social History
 
 
+----------------+-------+-----------+--------+------+
| Tobacco Use    | Types | Packs/Day | Years  | Date |
|                |       |           | Used   |      |
+----------------+-------+-----------+--------+------+
| Never Assessed |       |           |        |      |
+----------------+-------+-----------+--------+------+
 
 
 
+------------------+---------------+
| Sex Assigned at  | Date Recorded |
| Birth            |               |
+------------------+---------------+
| Not on file      |               |
+------------------+---------------+
 
 
 
+----------------+-------------+-------------+
| Job Start Date | Occupation  | Industry    |
+----------------+-------------+-------------+
| Not on file    | Not on file | Not on file |
+----------------+-------------+-------------+
 
 
 
+----------------+--------------+------------+
| Travel History | Travel Start | Travel End |
+----------------+--------------+------------+
 
 
 
 
+-------------------------------------+
| No recent travel history available. |
+-------------------------------------+
 documented as of this encounter
 
 Plan of Treatment
 
 
+--------+-----------+------------+----------------------+-------------------+
| Date   | Type      | Specialty  | Care Team            | Description       |
+--------+-----------+------------+----------------------+-------------------+
| / | Office    | Cardiology |   Tonia Wilson,    |                   |
|    | Visit     |            | ARNJESSICA  [address]   |                   |
|        |           |            | St  [place] [facility], [place]  |                   |
|        |           |            | [address]  [phone]  |                   |
|        |           |            |  [phone] (Fax)  |                   |
+--------+-----------+------------+----------------------+-------------------+
| / | Hospital  | Radiology  |   Popeye Townsend, |                   |
|    | Encounter |            |  MD  [address] |                   |
|        |           |            |  St.  [place],   |                   |
|        |           |            | [place] [address]             |                   |
|        |           |            | [phone]         |                   |
|        |           |            | [phone] (Fax)   |                   |
+--------+-----------+------------+----------------------+-------------------+
| / | Surgery   | Radiology  |   Popeye Townsend, | CV EP PPM SYSTEM  |
|    |           |            |  MD  [address] | IMPLANT           |
|        |           |            |  St.  [place],   |                   |
|        |           |            | [place] [address]             |                   |
|        |           |            | [phone]         |                   |
|        |           |            | [phone] (Fax)   |                   |
+--------+-----------+------------+----------------------+-------------------+
| 02/10/ | Clinical  | Cardiology |                      |                   |
|    | Support   |            |                      |                   |
+--------+-----------+------------+----------------------+-------------------+
| / | Office    | Cardiology |   Arlen Wilsona,    |                   |
|    | Visit     |            | [facility]  [address]   |                   |
|        |           |            | [place]  |                   |
|        |           |            | [address]  [phone]  |                   |
|        |           |            |  [phone] (Fax)  |                   |
+--------+-----------+------------+----------------------+-------------------+
| / | Off-Site  | Nephrology |   Marzena Moser  |                   |
|    | Visit     |            | DO ETIENNE  [address] [place] [address]      |                   |
|        |           |            | [address]      |                   |
|        |           |            | [place]      |                   |
|        |           |            | [address] [phone]  |                   |
|        |           |            |  [phone] (Fax)  |                   |
+--------+-----------+------------+----------------------+-------------------+
 documented as of this encounter
 
 Visit Diagnoses
 Not on filedocumented in this encounter

## 2020-01-08 NOTE — XMS
Encounter Summary
  Created on: 2020
 
 Viviana Ricci
 External Reference #: [id number]
 : 47
 Sex: Female
 
 Demographics
 
 
+-----------------------+----------------------+
| Address               | [address]      |
|                       | [place]  [address] |
+-----------------------+----------------------+
| Home Phone            | [phone]      |
+-----------------------+----------------------+
| Preferred Language    | Unknown              |
+-----------------------+----------------------+
| Marital Status        | Single               |
+-----------------------+----------------------+
| Yazidi Affiliation | 1009                 |
+-----------------------+----------------------+
| Race                  | Unknown              |
+-----------------------+----------------------+
| Ethnic Group          | Unknown              |
+-----------------------+----------------------+
 
 
 Author
 
 
+--------------+--------------------------------------------+
| Author       | [facility]  |
|              | and [place]                                |
+--------------+--------------------------------------------+
| Organization | [facility]  |
|              | and [place]                                |
+--------------+--------------------------------------------+
| Address      | Unknown                                    |
+--------------+--------------------------------------------+
| Phone        | Unavailable                                |
+--------------+--------------------------------------------+
 
 
 
 Support
 
 
+----------------+--------------+---------------------+-----------------+
| Name           | Relationship | Address             | Phone           |
+----------------+--------------+---------------------+-----------------+
| Ada/Ed Radhames | ECON         | [place]              | [phone] |
|                |              | [place]  |                 |
|                |              |  [address]              |                 |
+----------------+--------------+---------------------+-----------------+
 
 
 
 
 Care Team Providers
 
 
+-----------------------+------+-------------+
| Care Team Member Name | Role | Phone       |
+-----------------------+------+-------------+
 PCP  | Unavailable |
+-----------------------+------+-------------+
 
 
 
 Reason for Visit
 
 
+-------------------+----------+
| Reason            | Comments |
+-------------------+----------+
| Medication Refill |          |
+-------------------+----------+
 
 
 
 Encounter Details
 
 
+--------+--------+---------------------+----------------------+-------------------+
| Date   | Type   | Department          | Care Team            | Description       |
+--------+--------+---------------------+----------------------+-------------------+
| / | Refill |   [facility]         |   Marzena Moser  | Medication Refill |
|    |        | NEPHROLOGY  [address]   | M, DO  [address]      |                   |
|        |        | [address]   | [address]      |                   |
|        |        | [place], [place]     | [place], [place]      |                   |
|        |        | [address]          | [address]  [phone]  |                   |
|        |        | [phone]        |  [phone] (Fax)  |                   |
+--------+--------+---------------------+----------------------+-------------------+
 
 
 
 Social History
 
 
+--------------+-------+-----------+--------+------+
| Tobacco Use  | Types | Packs/Day | Years  | Date |
|              |       |           | Used   |      |
+--------------+-------+-----------+--------+------+
| Never Smoker |       |           |        |      |
+--------------+-------+-----------+--------+------+
 
 
 
+---------------------+---+---+---+
| Smokeless Tobacco:  |   |   |   |
| Never Used          |   |   |   |
+---------------------+---+---+---+
 
 
 
+---------------------------------------------------------------+
| Comments: some second hand smoke exposure, but fairly minimal |
 
+---------------------------------------------------------------+
 
 
 
+-------------+-------------+---------+----------+
| Alcohol Use | Drinks/Week | oz/Week | Comments |
+-------------+-------------+---------+----------+
| No          |             |         |          |
+-------------+-------------+---------+----------+
 
 
 
+------------------+---------------+
| Sex Assigned at  | Date Recorded |
| Birth            |               |
+------------------+---------------+
| Not on file      |               |
+------------------+---------------+
 
 
 
+----------------+-------------+-------------+
| Job Start Date | Occupation  | Industry    |
+----------------+-------------+-------------+
| Not on file    | Not on file | Not on file |
+----------------+-------------+-------------+
 
 
 
+----------------+--------------+------------+
| Travel History | Travel Start | Travel End |
+----------------+--------------+------------+
 
 
 
+-------------------------------------+
| No recent travel history available. |
+-------------------------------------+
 documented as of this encounter
 
 Plan of Treatment
 
 
+--------+-----------+------------+----------------------+-------------------+
| Date   | Type      | Specialty  | Care Team            | Description       |
+--------+-----------+------------+----------------------+-------------------+
| / | Office    | Cardiology |   HellalfredoTonia,    |                   |
|    | Visit     |            | ARNP  [address]   |                   |
|        |           |            | St  [place], [place]  |                   |
|        |           |            | [address]  [phone]  |                   |
|        |           |            |  [phone] (Fax)  |                   |
+--------+-----------+------------+----------------------+-------------------+
| / | Hospital  | Radiology  |   Popeye Townsend, |                   |
|    | Encounter |            |  MD  [address] |                   |
|        |           |            |  St.  [place],   |                   |
|        |           |            | [place] [address]             |                   |
|        |           |            | [phone]         |                   |
|        |           |            | [phone] (Fax)   |                   |
+--------+-----------+------------+----------------------+-------------------+
| / | Surgery   | Radiology  |   Popeye Townsend, | CV EP PPM SYSTEM  |
 
|    |           |            |  MD  [address] | IMPLANT           |
|        |           |            |  St.  [place],   |                   |
|        |           |            | [place] [address]             |                   |
|        |           |            | [phone]         |                   |
|        |           |            | [phone] (Fax)   |                   |
+--------+-----------+------------+----------------------+-------------------+
| 02/10/ | Clinical  | Cardiology |                      |                   |
|    | Support   |            |                      |                   |
+--------+-----------+------------+----------------------+-------------------+
| / | Office    | Cardiology |   Tonia Wilson,    |                   |
|    | Visit     |            | ARNP  [address]   |                   |
|        |           |            | [place]  |                   |
|        |           |            | [address]  [phone]  |                   |
|        |           |            |  [phone] (Fax)  |                   |
+--------+-----------+------------+----------------------+-------------------+
| / | Off-Site  | Nephrology |   Marzena Moser  |                   |
|    | Visit     |            | DO ETIENNE  [address] [place] [address]      |                   |
|        |           |            | [address]      |                   |
|        |           |            | [place]      |                   |
|        |           |            | [address]  [phone]  |                   |
|        |           |            |  [phone] (Fax)  |                   |
+--------+-----------+------------+----------------------+-------------------+
 documented as of this encounter
 
 Visit Diagnoses
 
 
+------------------------------------------------------------------------------------------+
| Diagnosis                                                                                |
+------------------------------------------------------------------------------------------+
|   Type II or unspecified type diabetes mellitus without mention of complication,         |
| uncontrolled - Primary                                                                   |
+------------------------------------------------------------------------------------------+
|   Insulin dependent type 2 diabetes mellitus, uncontrolled (HCC)  Type II or unspecified |
|  type diabetes mellitus without mention of complication, uncontrolled                    |
+------------------------------------------------------------------------------------------+
 documented in this encounter

## 2020-01-08 NOTE — XMS
Encounter Summary
  Created on: 2020
 
 Viviana Ricci
 External Reference #: [id number]
 : 47
 Sex: Female
 
 Demographics
 
 
+-----------------------+----------------------+
| Address               | [address]      |
|                       | [place]  [address] |
+-----------------------+----------------------+
| Home Phone            | [phone]      |
+-----------------------+----------------------+
| Preferred Language    | Unknown              |
+-----------------------+----------------------+
| Marital Status        | Single               |
+-----------------------+----------------------+
| Episcopalian Affiliation | 1009                 |
+-----------------------+----------------------+
| Race                  | Unknown              |
+-----------------------+----------------------+
| Ethnic Group          | Unknown              |
+-----------------------+----------------------+
 
 
 Author
 
 
+--------------+--------------------------------------------+
| Author       | [facility]  |
|              | and [facility]                                |
+--------------+--------------------------------------------+
| Organization | [facility]  |
|              | and [facility]                                |
+--------------+--------------------------------------------+
| Address      | Unknown                                    |
+--------------+--------------------------------------------+
| Phone        | Unavailable                                |
+--------------+--------------------------------------------+
 
 
 
 Support
 
 
+----------------+--------------+---------------------+-----------------+
| Name           | Relationship | Address             | Phone           |
+----------------+--------------+---------------------+-----------------+
| Ada/Ed Radhames | ECON         | [address]              | [phone] |
|                |              | [place]  |                 |
|                |              |  [address]              |                 |
+----------------+--------------+---------------------+-----------------+
 
 
 
 
 Care Team Providers
 
 
+------------------------+------+-----------------+
| Care Team Member Name  | Role | Phone           |
+------------------------+------+-----------------+
| Marzena Moser DO | PCP  | [phone] |
+------------------------+------+-----------------+
 
 
 
 Encounter Details
 
 
+--------+----------+---------------------+----------------------+-------------+
| Date   | Type     | Department          | Care Team            | Description |
+--------+----------+---------------------+----------------------+-------------+
| 10/08/ | Abstract |   [facility]         |   Marzena Moser  |             |
|    |          | NEPHROLOGY  [address]   | DO [address]      |             |
|        |          | [address]   | [address]      |             |
|        |          | [place], [place]     | [place], [place]      |             |
|        |          | [address]          | [address]  [phone]  |             |
|        |          | [phone]        |  [phone] (Fax)  |             |
+--------+----------+---------------------+----------------------+-------------+
 
 
 
 Social History
 
 
+--------------+-------+-----------+--------+------+
| Tobacco Use  | Types | Packs/Day | Years  | Date |
|              |       |           | Used   |      |
+--------------+-------+-----------+--------+------+
| Never Smoker |       |           |        |      |
+--------------+-------+-----------+--------+------+
 
 
 
+---------------------+---+---+---+
| Smokeless Tobacco:  |   |   |   |
| Never Used          |   |   |   |
+---------------------+---+---+---+
 
 
 
+---------------------------------------------------------------+
| Comments: some second hand smoke exposure, but fairly minimal |
+---------------------------------------------------------------+
 
 
 
+-------------+-------------+---------+----------+
| Alcohol Use | Drinks/Week | oz/Week | Comments |
+-------------+-------------+---------+----------+
| No          |             |         |          |
+-------------+-------------+---------+----------+
 
 
 
 
+------------------+---------------+
| Sex Assigned at  | Date Recorded |
| Birth            |               |
+------------------+---------------+
| Not on file      |               |
+------------------+---------------+
 
 
 
+----------------+-------------+-------------+
| Job Start Date | Occupation  | Industry    |
+----------------+-------------+-------------+
| Not on file    | Not on file | Not on file |
+----------------+-------------+-------------+
 
 
 
+----------------+--------------+------------+
| Travel History | Travel Start | Travel End |
+----------------+--------------+------------+
 
 
 
+-------------------------------------+
| No recent travel history available. |
+-------------------------------------+
 documented as of this encounter
 
 Plan of Treatment
 
 
+--------+-----------+------------+----------------------+-------------------+
| Date   | Type      | Specialty  | Care Team            | Description       |
+--------+-----------+------------+----------------------+-------------------+
| / | Office    | Cardiology |   Tonia Wilson,    |                   |
|    | Visit     |            | ARNP  [address]   |                   |
|        |           |            | St  [place] [facility] [place]  |                   |
|        |           |            | [address]  [phone]  |                   |
|        |           |            |  [phone] (Fax)  |                   |
+--------+-----------+------------+----------------------+-------------------+
| / | Hospital  | Radiology  |   Popeye Townsend, |                   |
|    | Encounter |            |  MD  [address] |                   |
|        |           |            |  St.  [place],   |                   |
|        |           |            | [place] [address]             |                   |
|        |           |            | [phone]         |                   |
|        |           |            | [phone] (Fax)   |                   |
+--------+-----------+------------+----------------------+-------------------+
| / | Surgery   | Radiology  |   Popeye Townsend, | CV EP PPM SYSTEM  |
|    |           |            |  MD  [address] | IMPLANT           |
|        |           |            |  St.  [place],   |                   |
|        |           |            | [place] [address]             |                   |
|        |           |            | [phone]         |                   |
|        |           |            | [phone] (Fax)   |                   |
+--------+-----------+------------+----------------------+-------------------+
| 02/10/ | Clinical  | Cardiology |                      |                   |
2020   | Support   |            |                      |                   |
+--------+-----------+------------+----------------------+-------------------+
| / | Office    | Cardiology |   Tonia Wilson,    |                   |
|    | Visit     |            | [facility]  [address]   |                   |
 
|        |           |            |   [place]  |                   |
|        |           |            | [address]  [phone]  |                   |
|        |           |            |  [phone] (Fax)  |                   |
+--------+-----------+------------+----------------------+-------------------+
| / | Off-Site  | Nephrology |   Marzena Moser  |                   |
2020   | Visit     |            | DO ETIENNE  [address]      |                   |
|        |           |            | [address]      |                   |
|        |           |            | [place]      |                   |
|        |           |            | [address]  [phone]  |                   |
|        |           |            |  [phone] (Fax)  |                   |
+--------+-----------+------------+----------------------+-------------------+
 documented as of this encounter
 
 Procedures
 
 
+--------------------+--------+------------+----------------------+----------------------+
| Procedure Name     | Priori | Date/Time  | Associated Diagnosis | Comments             |
|                    | ty     |            |                      |                      |
+--------------------+--------+------------+----------------------+----------------------+
| EXTERNAL LAB:      | Routin | 10/03/2018 |                      |   Results for this   |
| TACROLIMUS LEVEL,  | e      |            |                      | procedure are in the |
| [facility]               |        |            |                      |  results section.    |
+--------------------+--------+------------+----------------------+----------------------+
 documented in this encounter
 
 Results
 External Lab: Tacrolimus Level, [facility] (10/03/2018)
 
+-------------+-------+-----------+------------+--------------+
| Component   | Value | Ref Range | Performed  | Pathologist  |
|             |       |           | At         | Signature    |
+-------------+-------+-----------+------------+--------------+
| Tacrolimus, | 6.9   |           |            |              |
|  [facility],      |       |           |            |              |
| External    |       |           |            |              |
+-------------+-------+-----------+------------+--------------+
 
 
 
+----------+
| Specimen |
+----------+
|          |
+----------+
 documented in this encounter
 
 Visit Diagnoses
 Not on filedocumented in this encounter

## 2020-01-08 NOTE — XMS
Encounter Summary
  Created on: 2020
 
 Viviana Ricci
 External Reference #: [id number]
 : 47
 Sex: Female
 
 Demographics
 
 
+-----------------------+----------------------+
| Address               | [address]      |
|                       | [place]  [address] |
+-----------------------+----------------------+
| Home Phone            | [phone]      |
+-----------------------+----------------------+
| Preferred Language    | Unknown              |
+-----------------------+----------------------+
| Marital Status        | Single               |
+-----------------------+----------------------+
| Congregation Affiliation | 1009                 |
+-----------------------+----------------------+
| Race                  | Unknown              |
+-----------------------+----------------------+
| Ethnic Group          | Unknown              |
+-----------------------+----------------------+
 
 
 Author
 
 
+--------------+--------------------------------------------+
| Author       | [facility]  |
|              | and [place]                                |
+--------------+--------------------------------------------+
| Organization | [facility]  |
|              | and [place]                                |
+--------------+--------------------------------------------+
| Address      | Unknown                                    |
+--------------+--------------------------------------------+
| Phone        | Unavailable                                |
+--------------+--------------------------------------------+
 
 
 
 Support
 
 
+----------------+--------------+---------------------+-----------------+
| Name           | Relationship | Address             | Phone           |
+----------------+--------------+---------------------+-----------------+
| Ada/Ed Radhames | ECON         | [address]              | [phone] |
|                |              | [place], [place]  |                 |
|                |              |  [address]              |                 |
+----------------+--------------+---------------------+-----------------+
 
 
 
 
 Care Team Providers
 
 
+-----------------------+------+-------------+
| Care Team Member Name | Role | Phone       |
+-----------------------+------+-------------+
 PCP  | Unavailable |
+-----------------------+------+-------------+
 
 
 
 Encounter Details
 
 
+--------+-----------+----------------------+-----------+-------------+
| Date   | Type      | Department           | Care Team | Description |
+--------+-----------+----------------------+-----------+-------------+
| / | Hospital  |   [facility] |           |             |
|    | Encounter |  MED CTR XRAY  [address]           |             |
|        |           |  [address]  [place]       |           |             |
|        |           | [place] [address] |           |             |
|        |           |   [phone]       |           |             |
+--------+-----------+----------------------+-----------+-------------+
 
 
 
 Social History
 
 
+----------------+-------+-----------+--------+------+
| Tobacco Use    | Types | Packs/Day | Years  | Date |
|                |       |           | Used   |      |
+----------------+-------+-----------+--------+------+
| Never Assessed |       |           |        |      |
+----------------+-------+-----------+--------+------+
 
 
 
+------------------+---------------+
| Sex Assigned at  | Date Recorded |
| Birth            |               |
+------------------+---------------+
| Not on file      |               |
+------------------+---------------+
 
 
 
+----------------+-------------+-------------+
| Job Start Date | Occupation  | Industry    |
+----------------+-------------+-------------+
| Not on file    | Not on file | Not on file |
+----------------+-------------+-------------+
 
 
 
+----------------+--------------+------------+
| Travel History | Travel Start | Travel End |
+----------------+--------------+------------+
 
 
 
 
+-------------------------------------+
| No recent travel history available. |
+-------------------------------------+
 documented as of this encounter
 
 Plan of Treatment
 
 
+--------+-----------+------------+----------------------+-------------------+
| Date   | Type      | Specialty  | Care Team            | Description       |
+--------+-----------+------------+----------------------+-------------------+
| / | Office    | Cardiology |   Tonia Wilson,    |                   |
|    | Visit     |            | ARNP  [address]   |                   |
|        |           |            | St  [place] [facility], [place]  |                   |
|        |           |            | [address]  [phone]  |                   |
|        |           |            |  [phone] (Fax)  |                   |
+--------+-----------+------------+----------------------+-------------------+
| / | Hospital  | Radiology  |   Popeye Townsend, |                   |
|    | Encounter |            |  MD  [address] |                   |
|        |           |            |  St.  [place],   |                   |
|        |           |            | [place] [address]             |                   |
|        |           |            | [phone]         |                   |
|        |           |            | [phone] (Fax)   |                   |
+--------+-----------+------------+----------------------+-------------------+
| / | Surgery   | Radiology  |   Popeye Townsend, | CV EP PPM SYSTEM  |
| 2020   |           |            |  MD  [address] | IMPLANT           |
|        |           |            |  St.  [place],   |                   |
|        |           |            | [place] [address]             |                   |
|        |           |            | [phone]         |                   |
|        |           |            | [phone] (Fax)   |                   |
+--------+-----------+------------+----------------------+-------------------+
| 02/10/ | Clinical  | Cardiology |                      |                   |
|    | Support   |            |                      |                   |
+--------+-----------+------------+----------------------+-------------------+
| / | Office    | Cardiology |   Hellberg[facility],    |                   |
|    | [facility]     |            | [facility]  [address]   |                   |
|        |           |            | [place]  |                   |
|        |           |            | [address]  [phone]  |                   |
|        |           |            |  [phone] (Fax)  |                   |
+--------+-----------+------------+----------------------+-------------------+
| / | Off-Site  | Nephrology |   Marzena Moser  |                   |
|    | Visit     |            | M, DO  [address]      |                   |
|        |           |            | [address]      |                   |
|        |           |            | [place]      |                   |
|        |           |            | [address]  [phone]  |                   |
|        |           |            |  [phone] (Fax)  |                   |
+--------+-----------+------------+----------------------+-------------------+
 documented as of this encounter
 
 Visit Diagnoses
 Not on filedocumented in this encounter

## 2020-01-08 NOTE — XMS
Encounter Summary
  Created on: 2020
 
 Viviana Ricci
 External Reference #: [id number]
 : 47
 Sex: Female
 
 Demographics
 
 
+-----------------------+----------------------+
| Address               | [address]      |
|                       | [place]  [address] |
+-----------------------+----------------------+
| Home Phone            | [phone]      |
+-----------------------+----------------------+
| Preferred Language    | Unknown              |
+-----------------------+----------------------+
| Marital Status        | Single               |
+-----------------------+----------------------+
| Gnosticism Affiliation | [id number]                 |
+-----------------------+----------------------+
| Race                  | Unknown              |
+-----------------------+----------------------+
| Ethnic Group          | Unknown              |
+-----------------------+----------------------+
 
 
 Author
 
 
+--------------+--------------------------------------------+
| Author       | [facility]  |
|              | and [facility]                                |
+--------------+--------------------------------------------+
| Organization | [facility]  |
|              | and [facility]                                |
+--------------+--------------------------------------------+
| Address      | Unknown                                    |
+--------------+--------------------------------------------+
| Phone        | Unavailable                                |
+--------------+--------------------------------------------+
 
 
 
 Support
 
 
+----------------+--------------+---------------------+-----------------+
| Name           | Relationship | Address             | Phone           |
+----------------+--------------+---------------------+-----------------+
| Ada/Ed Radhames | ECON         | [address]              | [phone] |
|                |              | [place], [place]  |                 |
|                |              |  [id number]              |                 |
+----------------+--------------+---------------------+-----------------+
 
 
 
 
 Care Team Providers
 
 
+-----------------------+------+-------------+
| Care Team Member Name | Role | Phone       |
+-----------------------+------+-------------+
 PCP  | Unavailable |
+-----------------------+------+-------------+
 
 
 
 Reason for Referral
 Evaluate & Treat (Routine)
 
+--------+--------------+-------------+--------------+--------------+---------------+
| Status | Reason       | Specialty   | Diagnoses /  | Referred By  | Referred To   |
|        |              |             | Procedures   | Contact      | Contact       |
+--------+--------------+-------------+--------------+--------------+---------------+
| Closed |   Specialty  | Orthopedic  |   Diagnoses  |   Shanti,  |   Tommy    |
|        | Services     | Surgery     |              | Marzena CLIFTON,   | Edgar GARCIA MD    |
|        | Required     |             | Degenerative | DO  [address] | [address]  |
|        |              |             |  joint       |  [place], [place] |  [place], |
|        |              |             | disease of   |  [address]  [place]  |  [place] [address]     |
|        |              |             | right        | [place]    | Phone:        |
|        |              |             | acromioclavi | [address]        | [phone]  |
|        |              |             | cular joint  | Phone:       |  Fax:         |
|        |              |             |  Chronic     | [phone] | [phone]  |
|        |              |             | right        |   Fax:       |               |
|        |              |             | shoulder     | [phone] |               |
|        |              |             | pain         |              |               |
+--------+--------------+-------------+--------------+--------------+---------------+
 
 
 
 
 Reason for Visit
 
 
+--------+------------------------------------+
| Reason | Comments                           |
+--------+------------------------------------+
| Other  | Requesting referral to Dr. Sanders |
+--------+------------------------------------+
 
 
 
 Encounter Details
 
 
+--------+-----------+---------------------+----------------------+--------------------+
| Date   | Type      | Department          | Care Team            | Description        |
+--------+-----------+---------------------+----------------------+--------------------+
| / | Telephone |   [facility]         |   Marzena Moser  | Other (Requesting  |
|    |           | NEPHROLOGY  [address]   | M, DO  [address]      | referral to     |
|        |           | [address]   | [place]      | [place])           |
|        |           | [place]     | [place]      |                    |
|        |           | [address]          | [address]  [phone]  |                    |
|        |           | [phone]        |  [phone] (Fax)  |                    |
+--------+-----------+---------------------+----------------------+--------------------+
 
 
 
 
 Social History
 
 
+--------------+-------+-----------+--------+------+
| Tobacco Use  | Types | Packs/Day | Years  | Date |
|              |       |           | Used   |      |
+--------------+-------+-----------+--------+------+
| Never Smoker |       |           |        |      |
+--------------+-------+-----------+--------+------+
 
 
 
+---------------------+---+---+---+
| Smokeless Tobacco:  |   |   |   |
| Never Used          |   |   |   |
+---------------------+---+---+---+
 
 
 
+---------------------------------------------------------------+
| Comments: some second hand smoke exposure, but fairly minimal |
+---------------------------------------------------------------+
 
 
 
+-------------+-------------+---------+----------+
| Alcohol Use | Drinks/Week | oz/Week | Comments |
+-------------+-------------+---------+----------+
| No          |             |         |          |
+-------------+-------------+---------+----------+
 
 
 
+------------------+---------------+
| Sex Assigned at  | Date Recorded |
| Birth            |               |
+------------------+---------------+
| Not on file      |               |
+------------------+---------------+
 
 
 
+----------------+-------------+-------------+
| Job Start Date | Occupation  | Industry    |
+----------------+-------------+-------------+
| Not on file    | Not on file | Not on file |
+----------------+-------------+-------------+
 
 
 
+----------------+--------------+------------+
| Travel History | Travel Start | Travel End |
+----------------+--------------+------------+
 
 
 
+-------------------------------------+
 
| No recent travel history available. |
+-------------------------------------+
 documented as of this encounter
 
 Plan of Treatment
 
 
+--------+-----------+------------+----------------------+-------------------+
| Date   | Type      | Specialty  | Care Team            | Description       |
+--------+-----------+------------+----------------------+-------------------+
| / | Office    | Cardiology |   Tonia Wilson,    |                   |
|    | Visit     |            | ARNP  [address]   |                   |
|        |           |            | St  [place], [place]  |                   |
|        |           |            | [address]  [phone]  |                   |
|        |           |            |  [phone] (Fax)  |                   |
+--------+-----------+------------+----------------------+-------------------+
| / | Hospital  | Radiology  |   Popeye Townsend, |                   |
|    | Encounter |            |  MD  [address] [place] |                   |
|        |           |            |  [place],   |                   |
|        |           |            | [place] [address]             |                   |
|        |           |            | [phone]         |                   |
|        |           |            | [phone] (Fax)   |                   |
+--------+-----------+------------+----------------------+-------------------+
| / | Surgery   | Radiology  |   Popeye Townsend, | CV EP PPM SYSTEM  |
|    |           |            |  MD  [address] | IMPLANT           |
|        |           |            |  [place],   |                   |
|        |           |            | [place] [address]             |                   |
|        |           |            | [phone]         |                   |
|        |           |            | [phone] (Fax)   |                   |
+--------+-----------+------------+----------------------+-------------------+
| 02/10/ | Clinical  | Cardiology |                      |                   |
|    | Support   |            |                      |                   |
+--------+-----------+------------+----------------------+-------------------+
| / | Office    | Cardiology |   Tonia Wilson,    |                   |
|    | Visit     |            | ARNP  [address]   |                   |
|        |           |            | [place]  |                   |
|        |           |            | [address] [phone]  |                   |
|        |           |            |  [phone] (Fax)  |                   |
+--------+-----------+------------+----------------------+-------------------+
| / | Off-Site  | Nephrology |   Marzena Moser  |                   |
2020   | Visit     |            | DO ETIENNE  [address] [place] [address]      |                   |
|        |           |            | [address]      |                   |
|        |           |            | [place]      |                   |
|        |           |            | [address] [phone]  |                   |
|        |           |            |  [phone] (Fax)  |                   |
+--------+-----------+------------+----------------------+-------------------+
 
 
 
+----------------------+-------------+--------+----------------------+---------------------+
| Name                 | Type        | Priori | Associated Diagnoses | Order Schedule      |
|                      |             | ty     |                      |                     |
+----------------------+-------------+--------+----------------------+---------------------+
| * [facility] [place]          | Outpatient  | Routin |   Degenerative joint | Ordered: 2016 |
| Orthopedic Surgery - | Referral    | e      |  disease of right    |                     |
|  AMB Referral        |             |        | acromioclavicular    |                     |
|                      |             |        | joint  Chronic right |                     |
|                      |             |        |  shoulder pain       |                     |
+----------------------+-------------+--------+----------------------+---------------------+
 documented as of this encounter
 
 
 Visit Diagnoses
 
 
+------------------------------------------------------------------------------------------+
| Diagnosis                                                                                |
+------------------------------------------------------------------------------------------+
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
|  unspecified whether generalized or localized, shoulder region                           |
+------------------------------------------------------------------------------------------+
|   Chronic right shoulder pain  Pain in joint, shoulder region                            |
+------------------------------------------------------------------------------------------+
 documented in this encounter

## 2020-01-08 NOTE — XMS
Encounter Summary
  Created on: 2020
 
 Viviana Ricci
 External Reference #: [id number]
 : 47
 Sex: Female
 
 Demographics
 
 
+-----------------------+----------------------+
| Address               | [address]      |
|                       | [place]  [address] |
+-----------------------+----------------------+
| Home Phone            | [phone]      |
+-----------------------+----------------------+
| Preferred Language    | Unknown              |
+-----------------------+----------------------+
| Marital Status        | Single               |
+-----------------------+----------------------+
| [employer] Affiliation | [id number]                 |
+-----------------------+----------------------+
| Race                  | Unknown              |
+-----------------------+----------------------+
| Ethnic Group          | Unknown              |
+-----------------------+----------------------+
 
 
 Author
 
 
+--------------+--------------------------------------------+
| Author       | [facility]  |
|              | and [facility]                                |
+--------------+--------------------------------------------+
| Organization | [facility]  |
|              | and [place]                                |
+--------------+--------------------------------------------+
| Address      | Unknown                                    |
+--------------+--------------------------------------------+
| Phone        | Unavailable                                |
+--------------+--------------------------------------------+
 
 
 
 Support
 
 
+----------------+--------------+---------------------+-----------------+
| Name           | Relationship | Address             | Phone           |
+----------------+--------------+---------------------+-----------------+
| Ada/Ed Radhames | ECON         | [address]              | [phone] |
|                |              | [place]  |                 |
|                |              |  [address]              |                 |
+----------------+--------------+---------------------+-----------------+
 
 
 
 
 Care Team Providers
 
 
+------------------------+------+-----------------+
| Care Team Member Name  | Role | Phone           |
+------------------------+------+-----------------+
| Marzena Moser DO | PCP  | [phone] |
+------------------------+------+-----------------+
 
 
 
 Encounter Details
 
 
+--------+----------+---------------------+----------------------+-------------+
| Date   | Type     | Department          | Care Team            | Description |
+--------+----------+---------------------+----------------------+-------------+
| / | Abstract |   [facility] [place]         |   Marzena Moser  |             |
| 2019   |          | NEPHROLOGY  [address]   | DO [address]      |             |
|        |          | [address]   | [address]      |             |
|        |          | [place], [place]     | [place], [place]      |             |
|        |          | [address]          | [address]  [phone]  |             |
|        |          | [phone]        |  [phone] (Fax)  |             |
+--------+----------+---------------------+----------------------+-------------+
 
 
 
 Social History
 
 
+--------------+-------+-----------+--------+------+
| Tobacco Use  | Types | Packs/Day | Years  | Date |
|              |       |           | Used   |      |
+--------------+-------+-----------+--------+------+
| Never Smoker |       |           |        |      |
+--------------+-------+-----------+--------+------+
 
 
 
+---------------------+---+---+---+
| Smokeless Tobacco:  |   |   |   |
| Never Used          |   |   |   |
+---------------------+---+---+---+
 
 
 
+---------------------------------------------------------------+
| Comments: some second hand smoke exposure, but fairly minimal |
+---------------------------------------------------------------+
 
 
 
+-------------+-------------+---------+----------+
| Alcohol Use | Drinks/Week | oz/Week | Comments |
+-------------+-------------+---------+----------+
| No          |             |         |          |
+-------------+-------------+---------+----------+
 
 
 
 
+------------------+---------------+
| Sex Assigned at  | Date Recorded |
| Birth            |               |
+------------------+---------------+
| Not on file      |               |
+------------------+---------------+
 
 
 
+----------------+-------------+-------------+
| Job Start Date | Occupation  | Industry    |
+----------------+-------------+-------------+
| Not on file    | Not on file | Not on file |
+----------------+-------------+-------------+
 
 
 
+----------------+--------------+------------+
| Travel History | Travel Start | Travel End |
+----------------+--------------+------------+
 
 
 
+-------------------------------------+
| No recent travel history available. |
+-------------------------------------+
 documented as of this encounter
 
 Plan of Treatment
 
 
+--------+-----------+------------+----------------------+-------------------+
| Date   | Type      | Specialty  | Care Team            | Description       |
+--------+-----------+------------+----------------------+-------------------+
| / | Office    | Cardiology |   Tonia Wilson,    |                   |
|    | Visit     |            | ARNP  [address]   |                   |
|        |           |            | St  [place] [facility] [place]  |                   |
|        |           |            | [address]  [phone]  |                   |
|        |           |            |  [phone] (Fax)  |                   |
+--------+-----------+------------+----------------------+-------------------+
| / | Hospital  | Radiology  |   Popeye Townsend, |                   |
|    | Encounter |            |  MD  [address] |                   |
|        |           |            |  St.  [place],   |                   |
|        |           |            | [place] [address]             |                   |
|        |           |            | [phone]         |                   |
|        |           |            | [phone] (Fax)   |                   |
+--------+-----------+------------+----------------------+-------------------+
| / | Surgery   | Radiology  |   Popeye Townsend, | CV EP PPM SYSTEM  |
|    |           |            |  MD  [address] | IMPLANT           |
|        |           |            |  St.  [place],   |                   |
|        |           |            | [place] [address]             |                   |
|        |           |            | [phone]         |                   |
|        |           |            | [phone] (Fax)   |                   |
+--------+-----------+------------+----------------------+-------------------+
| 02/10/ | Clinical  | Cardiology |                      |                   |
2020   | Support   |            |                      |                   |
+--------+-----------+------------+----------------------+-------------------+
| / | Office    | Cardiology |   Tonia Wilson,    |                   |
|    | Visit     |            | [facility]  [address]   |                   |
 
|        |           |            |   [place]  |                   |
|        |           |            | [address]  [phone]  |                   |
|        |           |            |  [phone] (Fax)  |                   |
+--------+-----------+------------+----------------------+-------------------+
| / | Off-Site  | Nephrology |   Marzena Moser  |                   |
2020   | Visit     |            | DO [facility]TIENNE  [address]      |                   |
|        |           |            | [address]      |                   |
|        |           |            | [place]      |                   |
|        |           |            | [address]  [phone]  |                   |
|        |           |            |  [phone] (Fax)  |                   |
+--------+-----------+------------+----------------------+-------------------+
 documented as of this encounter
 
 Procedures
 
 
+----------------------+--------+------------+----------------------+----------------------+
| Procedure Name       | Priori | Date/Time  | Associated Diagnosis | Comments             |
|                      | ty     |            |                      |                      |
+----------------------+--------+------------+----------------------+----------------------+
| EXTERNAL LAB: BUN    | Routin | 2019 |                      |   Results for this   |
|                      | e      |            |                      | procedure are in the |
|                      |        |            |                      |  results section.    |
+----------------------+--------+------------+----------------------+----------------------+
| EXTERNAL LAB:        | Routin | 2019 |                      |   Results for this   |
| GLUCOSE              | e      |            |                      | procedure are in the |
|                      |        |            |                      |  results section.    |
+----------------------+--------+------------+----------------------+----------------------+
| EXTERNAL LAB: ALT    | Routin | 2019 |                      |   Results for this   |
|                      | e      |            |                      | procedure are in the |
|                      |        |            |                      |  results section.    |
+----------------------+--------+------------+----------------------+----------------------+
| EXTERNAL LAB: ELOY    | Routin | 2019 |                      |   Results for this   |
|                      | e      |            |                      | procedure are in the |
|                      |        |            |                      |  results section.    |
+----------------------+--------+------------+----------------------+----------------------+
| EXTERNAL LAB:        | Routin | 2019 |                      |   Results for this   |
| ALKALINE PHOSPHATASE | e      |            |                      | procedure are in the |
|                      |        |            |                      |  results section.    |
+----------------------+--------+------------+----------------------+----------------------+
| EXTERNAL LAB:        | Routin | 2019 |                      |   Results for this   |
| BILIRUBIN, TOTAL     | e      |            |                      | procedure are in the |
|                      |        |            |                      |  results section.    |
+----------------------+--------+------------+----------------------+----------------------+
| EXTERNAL LAB:        | Routin | 2019 |                      |   Results for this   |
| ALBUMIN              | e      |            |                      | procedure are in the |
|                      |        |            |                      |  results section.    |
+----------------------+--------+------------+----------------------+----------------------+
| EXTERNAL LAB:        | Routin | 2019 |                      |   Results for this   |
| PROTEIN, TOTAL       | e      |            |                      | procedure are in the |
|                      |        |            |                      |  results section.    |
+----------------------+--------+------------+----------------------+----------------------+
| EXTERNAL LAB:        | Routin | 2019 |                      |   Results for this   |
| PHOSPHORUS           | e      |            |                      | procedure are in the |
|                      |        |            |                      |  results section.    |
+----------------------+--------+------------+----------------------+----------------------+
| EXTERNAL LAB:        | Routin | 2019 |                      |   Results for this   |
| MAGNESIUM            | e      |            |                      | procedure are in the |
|                      |        |            |                      |  results section.    |
+----------------------+--------+------------+----------------------+----------------------+
 
| EXTERNAL LAB:        | Routin | 2019 |                      |   Results for this   |
| CALCIUM              | e      |            |                      | procedure are in the |
|                      |        |            |                      |  results section.    |
+----------------------+--------+------------+----------------------+----------------------+
| EXTERNAL LAB: CARBON | Routin | 2019 |                      |   Results for this   |
|  DIOXIDE             | e      |            |                      | procedure are in the |
|                      |        |            |                      |  results section.    |
+----------------------+--------+------------+----------------------+----------------------+
| EXTERNAL LAB:        | Routin | 2019 |                      |   Results for this   |
| CHLORIDE             | e      |            |                      | procedure are in the |
|                      |        |            |                      |  results section.    |
+----------------------+--------+------------+----------------------+----------------------+
| EXTERNAL LAB:        | Routin | 2019 |                      |   Results for this   |
| POTASSIUM            | e      |            |                      | procedure are in the |
|                      |        |            |                      |  results section.    |
+----------------------+--------+------------+----------------------+----------------------+
| EXTERNAL LAB: SODIUM | Routin | 2019 |                      |   Results for this   |
|                      | e      |            |                      | procedure are in the |
|                      |        |            |                      |  results section.    |
+----------------------+--------+------------+----------------------+----------------------+
| EXTERNAL LAB: CBC    | Routin | 2019 |                      |   Results for this   |
|                      | e      |            |                      | procedure are in the |
|                      |        |            |                      |  results section.    |
+----------------------+--------+------------+----------------------+----------------------+
| TACROLIMUS TROUGH    | Routin | 2019 |                      |   Results for this   |
| LC-MS/MS             | e      |            |                      | procedure are in the |
|                      |        |            |                      |  results section.    |
+----------------------+--------+------------+----------------------+----------------------+
| EXTERNAL LAB: TSH    | Routin | 2019 |                      |   Results for this   |
|                      | e      |            |                      | procedure are in the |
|                      |        |            |                      |  results section.    |
+----------------------+--------+------------+----------------------+----------------------+
| EXTERNAL LAB:        | Routin | 2019 |                      |   Results for this   |
| TRIGLYCERIDES        | e      |            |                      | procedure are in the |
|                      |        |            |                      |  results section.    |
+----------------------+--------+------------+----------------------+----------------------+
| EXTERNAL LAB:        | Routin | 2019 |                      |   Results for this   |
| CHOLESTEROL, HDL     | e      |            |                      | procedure are in the |
|                      |        |            |                      |  results section.    |
+----------------------+--------+------------+----------------------+----------------------+
| EXTERNAL LAB:        | Routin | 2019 |                      |   Results for this   |
| CHOLESTEROL, TOTAL   | e      |            |                      | procedure are in the |
|                      |        |            |                      |  results section.    |
+----------------------+--------+------------+----------------------+----------------------+
| EXTERNAL LAB:        | Routin | 2019 |                      |   Results for this   |
| CHOLESTEROL, LDL     | e      |            |                      | procedure are in the |
|                      |        |            |                      |  results section.    |
+----------------------+--------+------------+----------------------+----------------------+
| EXTERNAL LAB: EGFR   | Routin | 2019 |                      |   Results for this   |
|                      | e      |            |                      | procedure are in the |
|                      |        |            |                      |  results section.    |
+----------------------+--------+------------+----------------------+----------------------+
| EXTERNAL LAB:        | Routin | 2019 |                      |   Results for this   |
| CREATININE           | e      |            |                      | procedure are in the |
|                      |        |            |                      |  results section.    |
+----------------------+--------+------------+----------------------+----------------------+
| HEMOGLOBIN A1C       | Routin | 2019 |                      |   Results for this   |
|                      | e      |            |                      | procedure are in the |
|                      |        |            |                      |  results section.    |
+----------------------+--------+------------+----------------------+----------------------+
 
 documented in this encounter
 
 Results
 Tacrolimus Trough, LC-MS/MS (2019)
 
+-------------+-------+-----------+------------+--------------+
| Component   | Value | Ref Range | Performed  | Pathologist  |
|             |       |           | At         | Signature    |
+-------------+-------+-----------+------------+--------------+
| Tacrolimus, | 5.5   |           |            |              |
|  LC-MS/MS,  |       |           |            |              |
| External    |       |           |            |              |
+-------------+-------+-----------+------------+--------------+
 
 
 
+----------+
| Specimen |
+----------+
| Blood    |
+----------+
 Hemoglobin A1C (2019)
 
+-------------+-------+-----------+------------+--------------+
| Component   | Value | Ref Range | Performed  | Pathologist  |
|             |       |           | At         | Signature    |
+-------------+-------+-----------+------------+--------------+
| Hemoglobin  | 6.4   | %         | EXTERNAL   |              |
| A1c         |       |           | LAB        |              |
+-------------+-------+-----------+------------+--------------+
 
 
 
+----------+
| Specimen |
+----------+
| Blood    |
+----------+
 
 
 
+----------------+---------+--------------------+--------------+
| Performing     | Address | City/State/Zipcode | Phone Number |
| Organization   |         |                    |              |
+----------------+---------+--------------------+--------------+
|   EXTERNAL LAB |         |                    |              |
+----------------+---------+--------------------+--------------+
 External Lab: BUN (2019)
 
+-----------+--------+-----------+------------+--------------+
| Component | Value  | Ref Range | Performed  | Pathologist  |
|           |        |           | At         | Signature    |
+-----------+--------+-----------+------------+--------------+
| BUN,      | 33 (A) | 6 - 23    | EXTERNAL   |              |
| External  |        |           | LAB        |              |
+-----------+--------+-----------+------------+--------------+
 
 
 
+----------------+---------+--------------------+--------------+
 
| Performing     | Address | City/State/Zipcode | Phone Number |
| Organization   |         |                    |              |
+----------------+---------+--------------------+--------------+
|   EXTERNAL LAB |         |                    |              |
+----------------+---------+--------------------+--------------+
 External Lab: Glucose (2019)
 
+-----------+-------+-----------+------------+--------------+
| Component | Value | Ref Range | Performed  | Pathologist  |
|           |       |           | At         | Signature    |
+-----------+-------+-----------+------------+--------------+
| Glucose,  | 94    | 70 - 100  | EXTERNAL   |              |
| External  |       |           | LAB        |              |
+-----------+-------+-----------+------------+--------------+
 
 
 
+----------------+---------+--------------------+--------------+
| Performing     | Address | City/State/Zipcode | Phone Number |
| Organization   |         |                    |              |
+----------------+---------+--------------------+--------------+
|   EXTERNAL LAB |         |                    |              |
+----------------+---------+--------------------+--------------+
 External Lab: ALT (2019)
 
+-----------+-------+-----------+------------+--------------+
| Component | Value | Ref Range | Performed  | Pathologist  |
|           |       |           | At         | Signature    |
+-----------+-------+-----------+------------+--------------+
| ALT,      | 18    | 7 - 52    | EXTERNAL   |              |
| External  |       |           | LAB        |              |
+-----------+-------+-----------+------------+--------------+
 
 
 
+----------------+---------+--------------------+--------------+
| Performing     | Address | City/State/Zipcode | Phone Number |
| Organization   |         |                    |              |
+----------------+---------+--------------------+--------------+
|   EXTERNAL LAB |         |                    |              |
+----------------+---------+--------------------+--------------+
 External Lab: AST (2019)
 
+-----------+-------+-----------+------------+--------------+
| Component | Value | Ref Range | Performed  | Pathologist  |
|           |       |           | At         | Signature    |
+-----------+-------+-----------+------------+--------------+
| AST,      | 32    | 13 - 39   | EXTERNAL   |              |
| External  |       |           | LAB        |              |
+-----------+-------+-----------+------------+--------------+
 
 
 
+----------------+---------+--------------------+--------------+
| Performing     | Address | City/State/Zipcode | Phone Number |
| Organization   |         |                    |              |
+----------------+---------+--------------------+--------------+
|   EXTERNAL LAB |         |                    |              |
+----------------+---------+--------------------+--------------+
 External Lab: Alkaline Phosphatase (2019)
 
 
+-----------+-------+-----------+------------+--------------+
| Component | Value | Ref Range | Performed  | Pathologist  |
|           |       |           | At         | Signature    |
+-----------+-------+-----------+------------+--------------+
| ALP,      | 72    | 31 - 130  | EXTERNAL   |              |
| External  |       |           | LAB        |              |
+-----------+-------+-----------+------------+--------------+
 
 
 
+----------------+---------+--------------------+--------------+
| Performing     | Address | City/State/Zipcode | Phone Number |
| Organization   |         |                    |              |
+----------------+---------+--------------------+--------------+
|   EXTERNAL LAB |         |                    |              |
+----------------+---------+--------------------+--------------+
 External Lab: Bilirubin, Total (2019)
 
+-------------+-------+-----------+------------+--------------+
| Component   | Value | Ref Range | Performed  | Pathologist  |
|             |       |           | At         | Signature    |
+-------------+-------+-----------+------------+--------------+
| Bilirubin,  | 0.7   | 0 - 1.2   | EXTERNAL   |              |
| Total,      |       |           | LAB        |              |
| External    |       |           |            |              |
+-------------+-------+-----------+------------+--------------+
 
 
 
+----------------+---------+--------------------+--------------+
| Performing     | Address | City/State/Zipcode | Phone Number |
| Organization   |         |                    |              |
+----------------+---------+--------------------+--------------+
|   EXTERNAL LAB |         |                    |              |
+----------------+---------+--------------------+--------------+
 External Lab: Albumin (2019)
 
+-----------+-------+-----------+------------+--------------+
| Component | Value | Ref Range | Performed  | Pathologist  |
|           |       |           | At         | Signature    |
+-----------+-------+-----------+------------+--------------+
| Albumin,  | 3.6   | 3.5 - 5   | EXTERNAL   |              |
| External  |       |           | LAB        |              |
+-----------+-------+-----------+------------+--------------+
 
 
 
+----------------+---------+--------------------+--------------+
| Performing     | Address | City/State/Zipcode | Phone Number |
| Organization   |         |                    |              |
+----------------+---------+--------------------+--------------+
|   EXTERNAL LAB |         |                    |              |
+----------------+---------+--------------------+--------------+
 External Lab: Protein, Total (2019)
 
+-----------+---------+-----------+------------+--------------+
| Component | Value   | Ref Range | Performed  | Pathologist  |
|           |         |           | At         | Signature    |
+-----------+---------+-----------+------------+--------------+
 
| Protein,  | 5.7 (A) | 6 - 8.3   | EXTERNAL   |              |
| Total,    |         |           | LAB        |              |
| External  |         |           |            |              |
+-----------+---------+-----------+------------+--------------+
 
 
 
+----------------+---------+--------------------+--------------+
| Performing     | Address | City/State/Zipcode | Phone Number |
| Organization   |         |                    |              |
+----------------+---------+--------------------+--------------+
|   EXTERNAL LAB |         |                    |              |
+----------------+---------+--------------------+--------------+
 External Lab: Phosphorus (2019)
 
+-------------+-------+-----------+------------+--------------+
| Component   | Value | Ref Range | Performed  | Pathologist  |
|             |       |           | At         | Signature    |
+-------------+-------+-----------+------------+--------------+
| Phosphorus, | 2.9   | 2.5 - 5   | EXTERNAL   |              |
|  External   |       |           | LAB        |              |
+-------------+-------+-----------+------------+--------------+
 
 
 
+----------------+---------+--------------------+--------------+
| Performing     | Address | City/State/Zipcode | Phone Number |
| Organization   |         |                    |              |
+----------------+---------+--------------------+--------------+
|   EXTERNAL LAB |         |                    |              |
+----------------+---------+--------------------+--------------+
 External Lab: Magnesium (2019)
 
+-------------+-------+-----------+------------+--------------+
| Component   | Value | Ref Range | Performed  | Pathologist  |
|             |       |           | At         | Signature    |
+-------------+-------+-----------+------------+--------------+
| Magnesium,  | 2     | 1.7 - 2.5 | EXTERNAL   |              |
| External    |       |           | LAB        |              |
+-------------+-------+-----------+------------+--------------+
 
 
 
+----------------+---------+--------------------+--------------+
| Performing     | Address | City/State/Zipcode | Phone Number |
| Organization   |         |                    |              |
+----------------+---------+--------------------+--------------+
|   EXTERNAL LAB |         |                    |              |
+----------------+---------+--------------------+--------------+
 External Lab: Calcium (2019)
 
+-----------+----------+------------+------------+--------------+
| Component | Value    | Ref Range  | Performed  | Pathologist  |
|           |          |            | At         | Signature    |
+-----------+----------+------------+------------+--------------+
| Calcium,  | 10.4 (A) | 8.5 - 10.3 | EXTERNAL   |              |
| External  |          |            | LAB        |              |
+-----------+----------+------------+------------+--------------+
 
 
 
 
+----------------+---------+--------------------+--------------+
| Performing     | Address | City/State/Zipcode | Phone Number |
| Organization   |         |                    |              |
+----------------+---------+--------------------+--------------+
|   EXTERNAL LAB |         |                    |              |
+----------------+---------+--------------------+--------------+
 External Lab: Carbon Dioxide (2019)
 
+-----------+-------+-----------+------------+--------------+
| Component | Value | Ref Range | Performed  | Pathologist  |
|           |       |           | At         | Signature    |
+-----------+-------+-----------+------------+--------------+
| Carbon    | 21    | 19 - 31   | EXTERNAL   |              |
| Dioxide,  |       |           | LAB        |              |
| External  |       |           |            |              |
+-----------+-------+-----------+------------+--------------+
 
 
 
+----------------+---------+--------------------+--------------+
| Performing     | Address | City/State/Zipcode | Phone Number |
| Organization   |         |                    |              |
+----------------+---------+--------------------+--------------+
|   EXTERNAL LAB |         |                    |              |
+----------------+---------+--------------------+--------------+
 External Lab: Chloride (2019)
 
+------------+-------+-----------+------------+--------------+
| Component  | Value | Ref Range | Performed  | Pathologist  |
|            |       |           | At         | Signature    |
+------------+-------+-----------+------------+--------------+
| Chloride,  | 111   | 95 - 112  | EXTERNAL   |              |
| External   |       |           | LAB        |              |
+------------+-------+-----------+------------+--------------+
 
 
 
+----------------+---------+--------------------+--------------+
| Performing     | Address | City/State/Zipcode | Phone Number |
| Organization   |         |                    |              |
+----------------+---------+--------------------+--------------+
|   EXTERNAL LAB |         |                    |              |
+----------------+---------+--------------------+--------------+
 External Lab: Potassium (2019)
 
+-------------+-------+-----------+------------+--------------+
| Component   | Value | Ref Range | Performed  | Pathologist  |
|             |       |           | At         | Signature    |
+-------------+-------+-----------+------------+--------------+
| Potassium,  | 4.7   | 3.6 - 5.1 | EXTERNAL   |              |
| External    |       |           | LAB        |              |
+-------------+-------+-----------+------------+--------------+
 
 
 
+----------------+---------+--------------------+--------------+
| Performing     | Address | City/State/Zipcode | Phone Number |
| Organization   |         |                    |              |
+----------------+---------+--------------------+--------------+
 
|   EXTERNAL LAB |         |                    |              |
+----------------+---------+--------------------+--------------+
 External Lab: Sodium (2019)
 
+-----------+-------+-----------+------------+--------------+
| Component | Value | Ref Range | Performed  | Pathologist  |
|           |       |           | At         | Signature    |
+-----------+-------+-----------+------------+--------------+
| Sodium,   | 143   | 132 - 143 | EXTERNAL   |              |
| External  |       |           | LAB        |              |
+-----------+-------+-----------+------------+--------------+
 
 
 
+----------------+---------+--------------------+--------------+
| Performing     | Address | City/State/Zipcode | Phone Number |
| Organization   |         |                    |              |
+----------------+---------+--------------------+--------------+
|   EXTERNAL LAB |         |                    |              |
+----------------+---------+--------------------+--------------+
 External Lab: CBC (2019)
 
+-----------+----------+-----------+------------+--------------+
| Component | Value    | Ref Range | Performed  | Pathologist  |
|           |          |           | At         | Signature    |
+-----------+----------+-----------+------------+--------------+
| WBC,      | 4.8      | 4.5 - 11  | EXTERNAL   |              |
| External  |          |           | LAB        |              |
+-----------+----------+-----------+------------+--------------+
| HGB,      | 13       | 12 - 16   | EXTERNAL   |              |
| External  |          |           | LAB        |              |
+-----------+----------+-----------+------------+--------------+
| HCT,      | 40.3     | 35 - 45   | EXTERNAL   |              |
| External  |          |           | LAB        |              |
+-----------+----------+-----------+------------+--------------+
| PLT,      | 177      | 140 - 440 | EXTERNAL   |              |
| External  |          |           | LAB        |              |
+-----------+----------+-----------+------------+--------------+
| RBC,      | 3.75 (A) | 3.8 - 5.1 | EXTERNAL   |              |
| External  |          |           | LAB        |              |
+-----------+----------+-----------+------------+--------------+
| MCV,      | 107 (A)  | 81 - 99   | EXTERNAL   |              |
| External  |          |           | LAB        |              |
+-----------+----------+-----------+------------+--------------+
| RDW,      | 14.4     | 10.5 - 15 | EXTERNAL   |              |
| External  |          |           | LAB        |              |
+-----------+----------+-----------+------------+--------------+
 
 
 
+----------------+---------+--------------------+--------------+
| Performing     | Address | City/State/Zipcode | Phone Number |
| Organization   |         |                    |              |
+----------------+---------+--------------------+--------------+
|   EXTERNAL LAB |         |                    |              |
+----------------+---------+--------------------+--------------+
 External Lab: TSH (2019)
 
+-----------+-------+------------+------------+--------------+
| Component | Value | Ref Range  | Performed  | Pathologist  |
 
|           |       |            | At         | Signature    |
+-----------+-------+------------+------------+--------------+
| TSH,      | 1.69  | 0.27 - 4.2 | EXTERNAL   |              |
| External  |       |            | LAB        |              |
+-----------+-------+------------+------------+--------------+
 
 
 
+----------+
| Specimen |
+----------+
| Blood    |
+----------+
 
 
 
+----------------+---------+--------------------+--------------+
| Performing     | Address | City/State/Zipcode | Phone Number |
| Organization   |         |                    |              |
+----------------+---------+--------------------+--------------+
|   EXTERNAL LAB |         |                    |              |
+----------------+---------+--------------------+--------------+
 External Lab: Triglycerides (2019)
 
+-------------+-------+-----------+------------+--------------+
| Component   | Value | Ref Range | Performed  | Pathologist  |
|             |       |           | At         | Signature    |
+-------------+-------+-----------+------------+--------------+
| Triglycerid | 122   | 30 - 150  | EXTERNAL   |              |
| es,         |       |           | LAB        |              |
| External    |       |           |            |              |
+-------------+-------+-----------+------------+--------------+
 
 
 
+----------+
| Specimen |
+----------+
| Blood    |
+----------+
 
 
 
+----------------+---------+--------------------+--------------+
| Performing     | Address | City/State/Zipcode | Phone Number |
| Organization   |         |                    |              |
+----------------+---------+--------------------+--------------+
|   EXTERNAL LAB |         |                    |              |
+----------------+---------+--------------------+--------------+
 External Lab: Cholesterol, HDL (2019)
 
+-------------+-------+-----------+------------+--------------+
| Component   | Value | Ref Range | Performed  | Pathologist  |
|             |       |           | At         | Signature    |
+-------------+-------+-----------+------------+--------------+
| HDL         | 50.4  | 40 mg/dl  | EXTERNAL   |              |
| Cholesterol |       |           | LAB        |              |
| , External  |       |           |            |              |
+-------------+-------+-----------+------------+--------------+
 
 
 
 
+----------+
| Specimen |
+----------+
| Blood    |
+----------+
 
 
 
+----------------+---------+--------------------+--------------+
| Performing     | Address | City/State/Zipcode | Phone Number |
| Organization   |         |                    |              |
+----------------+---------+--------------------+--------------+
|   EXTERNAL LAB |         |                    |              |
+----------------+---------+--------------------+--------------+
 External Lab: Cholesterol, Total (2019)
 
+-------------+-------+-----------+------------+--------------+
| Component   | Value | Ref Range | Performed  | Pathologist  |
|             |       |           | At         | Signature    |
+-------------+-------+-----------+------------+--------------+
| Cholesterol | 113   | 200 mg/dl | EXTERNAL   |              |
| , Total,    |       |           | LAB        |              |
| External    |       |           |            |              |
+-------------+-------+-----------+------------+--------------+
 
 
 
+----------+
| Specimen |
+----------+
| Blood    |
+----------+
 
 
 
+----------------+---------+--------------------+--------------+
| Performing     | Address | City/State/Zipcode | Phone Number |
| Organization   |         |                    |              |
+----------------+---------+--------------------+--------------+
|   EXTERNAL LAB |         |                    |              |
+----------------+---------+--------------------+--------------+
 External Lab: Cholesterol, LDL (2019)
 
+-------------+-------+-----------+------------+--------------+
| Component   | Value | Ref Range | Performed  | Pathologist  |
|             |       |           | At         | Signature    |
+-------------+-------+-----------+------------+--------------+
| LDL         | 38    | 100       | EXTERNAL   |              |
| Cholesterol |       |           | LAB        |              |
| , Direct,   |       |           |            |              |
| External    |       |           |            |              |
+-------------+-------+-----------+------------+--------------+
 
 
 
+----------+
| Specimen |
+----------+
 
| Blood    |
+----------+
 
 
 
+----------------+---------+--------------------+--------------+
| Performing     | Address | City/State/Zipcode | Phone Number |
| Organization   |         |                    |              |
+----------------+---------+--------------------+--------------+
|   EXTERNAL LAB |         |                    |              |
+----------------+---------+--------------------+--------------+
 External Lab: eGFR (2019)
 
+-----------+-------+-----------+------------+--------------+
| Component | Value | Ref Range | Performed  | Pathologist  |
|           |       |           | At         | Signature    |
+-----------+-------+-----------+------------+--------------+
| eGFR,     | 41    |           | EXTERNAL   |              |
| External  |       |           | LAB        |              |
+-----------+-------+-----------+------------+--------------+
 
 
 
+----------+
| Specimen |
+----------+
| Blood    |
+----------+
 
 
 
+----------------+---------+--------------------+--------------+
| Performing     | Address | City/State/Zipcode | Phone Number |
| Organization   |         |                    |              |
+----------------+---------+--------------------+--------------+
|   EXTERNAL LAB |         |                    |              |
+----------------+---------+--------------------+--------------+
 External Lab: Creatinine (2019)
 
+-------------+----------+------------+------------+--------------+
| Component   | Value    | Ref Range  | Performed  | Pathologist  |
|             |          |            | At         | Signature    |
+-------------+----------+------------+------------+--------------+
| Creatinine, | 1.29 (A) | 0.7 - 1.18 | EXTERNAL   |              |
|  External   |          |            | LAB        |              |
+-------------+----------+------------+------------+--------------+
 
 
 
+----------+
| Specimen |
+----------+
| Blood    |
+----------+
 
 
 
+----------------+---------+--------------------+--------------+
| Performing     | Address | City/State/Zipcode | Phone Number |
| Organization   |         |                    |              |
 
+----------------+---------+--------------------+--------------+
|   EXTERNAL LAB |         |                    |              |
+----------------+---------+--------------------+--------------+
 documented in this encounter
 
 Visit Diagnoses
 Not on filedocumented in this encounter

## 2020-01-08 NOTE — XMS
Encounter Summary
  Created on: 2020
 
 Viviana Ricci
 External Reference #: [id number]
 : 47
 Sex: Female
 
 Demographics
 
 
+-----------------------+----------------------+
| Address               | [address]      |
|                       | [place]  [address] |
+-----------------------+----------------------+
| Home Phone            | [phone]      |
+-----------------------+----------------------+
| Preferred Language    | Unknown              |
+-----------------------+----------------------+
| Marital Status        | Single               |
+-----------------------+----------------------+
| Taoist Affiliation | [id number]                 |
+-----------------------+----------------------+
| Race                  | Unknown              |
+-----------------------+----------------------+
| Ethnic Group          | Unknown              |
+-----------------------+----------------------+
 
 
 Author
 
 
+--------------+--------------------------------------------+
| Author       | [facility]  |
|              | and [facility]                                |
+--------------+--------------------------------------------+
| Organization | [facility]  |
|              | and [place]                                |
+--------------+--------------------------------------------+
| Address      | Unknown                                    |
+--------------+--------------------------------------------+
| Phone        | Unavailable                                |
+--------------+--------------------------------------------+
 
 
 
 Support
 
 
+----------------+--------------+---------------------+-----------------+
| Name           | Relationship | Address             | Phone           |
+----------------+--------------+---------------------+-----------------+
| Ada/Ed Radhames | ECON         | [address]              | [phone] |
|                |              | [place]  |                 |
|                |              |  [id number]              |                 |
+----------------+--------------+---------------------+-----------------+
 
 
 
 
 Care Team Providers
 
 
+------------------------+------+-----------------+
| Care Team Member Name  | Role | Phone           |
+------------------------+------+-----------------+
| Marzena Moser DO | PCP  | [phone] |
+------------------------+------+-----------------+
 
 
 
 Reason for Visit
 
 
+--------+---------------------------------------------+
| Reason | Comments                                    |
+--------+---------------------------------------------+
| Other  | stop metoprolol due to pauses / bradycardia |
+--------+---------------------------------------------+
 
 
 
 Encounter Details
 
 
+--------+-----------+----------------------+----------------------+--------------------+
| Date   | Type      | Department           | Care Team            | Description        |
+--------+-----------+----------------------+----------------------+--------------------+
| 10/24/ | Telephone |   [facility] [place]          |   Tonia Wilson,    | Other (stop        |
| 2019   |           | CARDIOLOGY  [address]    | ARNP  [address]   | metoprolol due to  |
|        |           | [address]  [address] [address], | St  [facility] [place]  | pauses /           |
|        |           |  [place] [address]       | [address]  [phone]  | bradycardia)       |
|        |           | [phone]         |  [phone] (Fax)  |                    |
+--------+-----------+----------------------+----------------------+--------------------+
 
 
 
 Social History
 
 
+--------------+-------+-----------+--------+------+
| Tobacco Use  | Types | Packs/Day | Years  | Date |
|              |       |           | Used   |      |
+--------------+-------+-----------+--------+------+
| Never Smoker |       |           |        |      |
+--------------+-------+-----------+--------+------+
 
 
 
+---------------------+---+---+---+
| Smokeless Tobacco:  |   |   |   |
| Never Used          |   |   |   |
+---------------------+---+---+---+
 
 
 
+---------------------------------------------------------------+
| Comments: some second hand smoke exposure, but fairly minimal |
+---------------------------------------------------------------+
 
 
 
 
+-------------+-------------+---------+----------+
| Alcohol Use | Drinks/Week | oz/Week | Comments |
+-------------+-------------+---------+----------+
| No          |             |         |          |
+-------------+-------------+---------+----------+
 
 
 
+------------------+---------------+
| Sex Assigned at  | Date Recorded |
| Birth            |               |
+------------------+---------------+
| Not on file      |               |
+------------------+---------------+
 
 
 
+----------------+-------------+-------------+
| Job Start Date | Occupation  | Industry    |
+----------------+-------------+-------------+
| Not on file    | Not on file | Not on file |
+----------------+-------------+-------------+
 
 
 
+----------------+--------------+------------+
| Travel History | Travel Start | Travel End |
+----------------+--------------+------------+
 
 
 
+-------------------------------------+
| No recent travel history available. |
+-------------------------------------+
 documented as of this encounter
 
 Plan of Treatment
 
 
+--------+-----------+------------+----------------------+-------------------+
| Date   | Type      | Specialty  | Care Team            | Description       |
+--------+-----------+------------+----------------------+-------------------+
| / | Office    | Cardiology |   Tonia Wilson,    |                   |
|    | Visit     |            | ARNP  [address]   |                   |
|        |           |            | [place]  |                   |
|        |           |            | [address]  [phone]  |                   |
|        |           |            |  [phone] (Fax)  |                   |
+--------+-----------+------------+----------------------+-------------------+
| / | Hospital  | Radiology  |   Popeye Townsend, |                   |
|    | Encounter |            |  MD  [address] |                   |
|        |           |            |  [place],   |                   |
|        |           |            | [place] [address]             |                   |
|        |           |            | [phone]         |                   |
|        |           |            | [phone] (Fax)   |                   |
+--------+-----------+------------+----------------------+-------------------+
| / | Surgery   | Radiology  |   Popeye Townsend, | CV EP PPM SYSTEM  |
|    |           |            |  MD  [address] | IMPLANT           |
 
|        |           |            |  St.  [place],   |                   |
|        |           |            | [place] [address]             |                   |
|        |           |            | [phone]         |                   |
|        |           |            | [phone] (Fax)   |                   |
+--------+-----------+------------+----------------------+-------------------+
| 02/10/ | Clinical  | Cardiology |                      |                   |
|    | Support   |            |                      |                   |
+--------+-----------+------------+----------------------+-------------------+
| / | Office    | Cardiology |   Tonia Wilson,    |                   |
|    | Visit     |            | ARNP  [address]   |                   |
|        |           |            | [place]  |                   |
|        |           |            | [address]  [phone]  |                   |
|        |           |            |  [phone] (Fax)  |                   |
+--------+-----------+------------+----------------------+-------------------+
| / | Off-Site  | Nephrology |   Marzena Moser  |                   |
|    | Visit     |            | DO [place]TI[facility]  [facility]      |                   |
|        |           |            | [address]      |                   |
|        |           |            | [place]      |                   |
|        |           |            | [address]  [phone]  |                   |
|        |           |            |  [phone] (Fax)  |                   |
+--------+-----------+------------+----------------------+-------------------+
 documented as of this encounter
 
 Visit Diagnoses
 Not on filedocumented in this encounter

## 2020-01-08 NOTE — XMS
Encounter Summary
  Created on: 2020
 
 Ras Ricci
 External Reference #: [id number]
 : 47
 Sex: Female
 
 Demographics
 
 
+-----------------------+----------------------+
| Address               | [address]      |
|                       | [place]  [address] |
+-----------------------+----------------------+
| Home Phone            | [phone]      |
+-----------------------+----------------------+
| Preferred Language    | Unknown              |
+-----------------------+----------------------+
| Marital Status        | Single               |
+-----------------------+----------------------+
| Taoism Affiliation | [id number]                 |
+-----------------------+----------------------+
| Race                  | Unknown              |
+-----------------------+----------------------+
| Ethnic Group          | Unknown              |
+-----------------------+----------------------+
 
 
 Author
 
 
+--------------+--------------------------------------------+
| Author       | [facility]  |
|              | and [facility]                                |
+--------------+--------------------------------------------+
| Organization | [facility]  |
|              | and [place]                                |
+--------------+--------------------------------------------+
| Address      | Unknown                                    |
+--------------+--------------------------------------------+
| Phone        | Unavailable                                |
+--------------+--------------------------------------------+
 
 
 
 Support
 
 
+----------------+--------------+---------------------+-----------------+
| Name           | Relationship | Address             | Phone           |
+----------------+--------------+---------------------+-----------------+
| Ada/Ed Radhames | ECON         | [address]              | [phone] |
|                |              | [place]  |                 |
|                |              |  [address]              |                 |
+----------------+--------------+---------------------+-----------------+
 
 
 
 
 Care Team Providers
 
 
+-----------------------+------+-------------+
| Care Team Member Name | Role | Phone       |
+-----------------------+------+-------------+
 PCP  | Unavailable |
+-----------------------+------+-------------+
 
 
 
 Reason for Visit
 
 
+---------------------+---------------+
| Reason              | Comments      |
+---------------------+---------------+
| Cough               | Rm 1 x 5 days |
+---------------------+---------------+
| Wheezing            |               |
+---------------------+---------------+
| Shortness of Breath |               |
+---------------------+---------------+
 
 
 
 Encounter Details
 
 
+--------+---------+----------------------+----------------------+--------------------+
| Date   | Type    | Department           | Care Team            | Description        |
+--------+---------+----------------------+----------------------+--------------------+
| / | Office  |   [facility]          |   Abdiel Mireles    | Cough (Primary Dx) |
|    | Visit   | CONVENIENT CARE  [address] | Garry Greenfield MD      |                    |
|        |         |  [facility] | [address]       |                    |
|        |         |  [place]           | [place]      |                    |
|        |         | [address]           | [address] [phone]  |                    |
|        |         | [phone]         |  [phone] (Fax)  |                    |
+--------+---------+----------------------+----------------------+--------------------+
 
 
 
 Social History
 
 
+--------------+-------+-----------+--------+------+
| Tobacco Use  | Types | Packs/Day | Years  | Date |
|              |       |           | Used   |      |
+--------------+-------+-----------+--------+------+
| Never Smoker |       |           |        |      |
+--------------+-------+-----------+--------+------+
 
 
 
+---------------------+---+---+---+
| Smokeless Tobacco:  |   |   |   |
| Never Used          |   |   |   |
+---------------------+---+---+---+
 
 
 
 
+-------------+-------------+---------+----------+
| Alcohol Use | Drinks/Week | oz/Week | Comments |
+-------------+-------------+---------+----------+
| No          |             |         |          |
+-------------+-------------+---------+----------+
 
 
 
+------------------+---------------+
| Sex Assigned at  | Date Recorded |
| Birth            |               |
+------------------+---------------+
| Not on file      |               |
+------------------+---------------+
 
 
 
+----------------+-------------+-------------+
| Job Start Date | Occupation  | Industry    |
+----------------+-------------+-------------+
| Not on file    | Not on file | Not on file |
+----------------+-------------+-------------+
 
 
 
+----------------+--------------+------------+
| Travel History | Travel Start | Travel End |
+----------------+--------------+------------+
 
 
 
+-------------------------------------+
| No recent travel history available. |
+-------------------------------------+
 documented as of this encounter
 
 Last Filed Vital Signs
 
 
+-------------------+---------------------+----------------------+----------+
| Vital Sign        | Reading             | Time Taken           | Comments |
+-------------------+---------------------+----------------------+----------+
| Blood Pressure    | 118/78              | 2013  8:22 AM  |          |
|                   |                     | PST                  |          |
+-------------------+---------------------+----------------------+----------+
| Pulse             | 73                  | 2013  8:22 AM  |          |
|                   |                     | PST                  |          |
+-------------------+---------------------+----------------------+----------+
| Temperature       | 36.2   C (97.1   F) | 2013  8:22 AM  |          |
|                   |                     | PST                  |          |
+-------------------+---------------------+----------------------+----------+
| Respiratory Rate  | 16                  | 2013  8:22 AM  |          |
|                   |                     | PST                  |          |
+-------------------+---------------------+----------------------+----------+
| Oxygen Saturation | 98%                 | 2013  8:22 AM  |          |
|                   |                     | PST                  |          |
+-------------------+---------------------+----------------------+----------+
| Inhaled Oxygen    | -                   | -                    |          |
 
| Concentration     |                     |                      |          |
+-------------------+---------------------+----------------------+----------+
| Weight            | 129.7 kg (286 lb)   | 2013  8:22 AM  |          |
|                   |                     | PST                  |          |
+-------------------+---------------------+----------------------+----------+
| Height            | 167.6 cm (5' 6")    | 2013  8:22 AM  |          |
|                   |                     | PST                  |          |
+-------------------+---------------------+----------------------+----------+
| Body Mass Index   | 46.16               | 2013  8:22 AM  |          |
|                   |                     | PST                  |          |
+-------------------+---------------------+----------------------+----------+
 documented in this encounter
 
 Patient Instructions
 Patient Instructions Abdiel Mireles Jr., MD - 2013  9:53 AM PSTFollow-up wit
h your doctor if not improving in 5 days
 Take medication as directed
 Increase fluid intake
 Recheck sooner, in the clinic, with your doctor or in the ER, if your symptoms worsen or ne
w problems develop Electronically signed by Abdiel Mireles Jr., MD at 2013  9:
53 AM PST
 documented in this encounter
 
 Progress Notes
 Pepito Tejada RN - 2013 10:11 AM PSTalbuterol 2.5 mg/3 mL nebulizer solution 2.
5 mg given to patient. Patient stated no improvement after treatment given.Dr Mireles notif
ied. 
 Electronically signed by Pepito Piper RN at 2013 10:47 AM Abdiel Jaffe Jr., MD - 2013  8:45 AM PSTGaelizabeth Ricci presents with a history of type II diabete
s and a kidney transplant.  Her transplant apparently has been well controlled as far as rej
ection and she states that her kidney function has been good.  Her blood sugars have been sl
ightly high lately.  Last  she developed a cough was treated with 3 courses of antibioti
c with a diagnosis of bronchitis. This eventually resolved at the end of December.  Througho
 she has been cough free.  She now presents with a one-week history of recurrent c
ough which has been mainly nonproductive.  There's been no fever.  She denies sore throat ru
nny nose or other symptoms.  She denies ever having had a pneumonia vaccine.  She states deshaun
t she it is hard for her to sleep because of the noise in her chest.  She denies a history o
f asthma.  No recent history of GERD.  No recent problems with postnasal drainage
 
 Exam: No respiratory distress
 Nose: clear
 Ears:  TM's  not inflamed
 Throat: erythema, no exudate
 Neck:  Supple, no stridor, no adenopathy
 Chest:  No rales, no rhonchi, no wheezes, good breath sounds bilaterally
 Heart:  Regular rhythm, no murmur, no gallop, no rub
  
 CXR:  Prior surgery, scoliosis, no infiltrate
 
 Diagnosis:  Bronchitis, rule out occult asthmaElectronically signed by Abdiel Reyes ms, Jr., MD at 2013 10:47 AM PSTdocumented in this encounter
 
 Plan of Treatment
 
 
+--------+-----------+------------+----------------------+-------------------+
| Date   | Type      | Specialty  | Care Team            | Description       |
+--------+-----------+------------+----------------------+-------------------+
| / | Office    | Cardiology |   Tonia Wilson,    |                   |
2020   | Visit     |            | ARNP  [address]   |                   |
 
|        |           |            | St [place], [place]  |                   |
|        |           |            | [address]  [phone]  |                   |
|        |           |            |  [phone] (Fax)  |                   |
+--------+-----------+------------+----------------------+-------------------+
| / | Hospital  | Radiology  |   Popeye Townsend, |                   |
|    | Encounter |            |  MD  [address] |                   |
|        |           |            |  St.  [place],   |                   |
|        |           |            | [place] [address]             |                   |
|        |           |            | [phone]         |                   |
|        |           |            | [phone] (Fax)   |                   |
+--------+-----------+------------+----------------------+-------------------+
| / | Surgery   | Radiology  |   Popeye Townsend, | CV EP PPM SYSTEM  |
|    |           |            |  MD  [address] | IMPLANT           |
|        |           |            |  St.  [place],   |                   |
|        |           |            | [place] [address]             |                   |
|        |           |            | [phone]         |                   |
|        |           |            | [phone] (Fax)   |                   |
+--------+-----------+------------+----------------------+-------------------+
| 02/10/ | Clinical  | Cardiology |                      |                   |
|    | Support   |            |                      |                   |
+--------+-----------+------------+----------------------+-------------------+
| / | Office    | Cardiology |   Tonia Wilson,    |                   |
|    | Visit     |            | [facility]  [address]   |                   |
|        |           |            | [place]  |                   |
|        |           |            | [address]  [phone]  |                   |
|        |           |            |  [phone] (Fax)  |                   |
+--------+-----------+------------+----------------------+-------------------+
| / | Off-Site  | Nephrology |   Marzena Moser  |                   |
2020   | Visit     |            | DO [facility]TI[facility]  [address] [place] [address]      |                   |
|        |           |            | [address]      |                   |
|        |           |            | [place]      |                   |
|        |           |            | [address]  [phone]  |                   |
|        |           |            |  [phone] (Fax)  |                   |
+--------+-----------+------------+----------------------+-------------------+
 
 
 
+------------------+---------+--------+----------------------+---------------------+
| Name             | Type    | Priori | Associated Diagnoses | Order Schedule      |
|                  |         | ty     |                      |                     |
+------------------+---------+--------+----------------------+---------------------+
| XR Chest PA and  | Imaging | Routin |   Cough              | Ordered: 2013 |
| Lateral          |         | e      |                      |                     |
+------------------+---------+--------+----------------------+---------------------+
 documented as of this encounter
 
 Results
 XR Chest PA and Lateral (2013 10:54 AM PST)
 
+----------+
| Specimen |
+----------+
|          |
+----------+
 
 
 
+------------------------------------------------------------------------+-----------------+
| Narrative                                                              | Performed At    |
+------------------------------------------------------------------------+-----------------+
 
|    formerly [facility]      Diagnostic Imaging          |   [place]    |
| Department      [address] [place]      [phone]    | [facility]        |
|                             [   rep ct [address]]     [   rep ct [facility]  |
| st zip]     **** Signed ****                                           | - IMAGING       |
| ______________________________________________________________________ |                 |
| ______________________     Patient Name: RAS RICCI   Physician:     |                 |
| .02     : 1945    Age: 67   Sex: F     Unit #: [id number]    |                 |
|   Exam Date: 13     Acct #: [id number]   Location: [facility]     |                 |
|     Report #: [id number]                      Page:                     |                 |
|   %(RAD)RES..mtdd.print.filter("pg") of   %(RAD)                       |                 |
| RES..mtdd.print.filter("tpg")                                          |                 |
| ______________________________________________________________________ |                 |
| ______________________     Accession Number: [id number]                |                 |
| TWO VIEW CHEST              CLINICAL HISTORY:   COUGH.                 |                 |
| COMPARISON:   2008.              FINDINGS:   There is    |                 |
| stable mild cardiomegaly.   Sternal wires are in unchanged position.   |                 |
|   Aorta shows   intimal calcifications but a stable appearance.        |                 |
|   Pulmonary vasculature is somewhat prominent centrally   but also     |                 |
| unchanged.              No focal areas of abnormal lung density are    |                 |
| seen.   The patient is somewhat rotated on this exam.   No   acute     |                 |
| bony abnormalities.              IMPRESSION:       1. POSTOP           |                 |
| STERNOTOMY WITH STABLE CARDIOMEGALY. NO ACUTE CARDIOPULMONARY          |                 |
| ABNORMALITY.              Dictated Date/Time:   2013 10:54       |                 |
| Transcribed Date/Time:   2013 11:03      :       |                 |
|                         <<Signature on File>>                     |                 |
| -----------------------------------------------------------            |                 |
| Jon Marcus      <Electronically signed by       |                 |
| Jon Marcus MD>               Jon Marcus MD 13     |                 |
| 1054     : [id number]/14/13 1103          |                 |
|       Abdiel Mireles Jr, MD                                         |                 |
+------------------------------------------------------------------------+-----------------+
 
 
 
+----------------------+---------------------+--------------------+----------------+
| Performing           | Address             | City/State/Zipcode | Phone Number   |
| Organization         |                     |                    |                |
+----------------------+---------------------+--------------------+----------------+
|   [facility]     |   [address] | [place]    |   [phone] |
| [facility]  |                     | [address]              |                |
| - IMAGING            |                     |                    |                |
+----------------------+---------------------+--------------------+----------------+
 documented in this encounter
 
 Visit Diagnoses
 
 
+-------------------+
| Diagnosis         |
+-------------------+
|   Cough - Primary |
+-------------------+
 documented in this encounter
 
 Administered Medications
 
 
+-----------------------------------+--------+----------+--------+------+------+
| Medication Order                  | MAR    | Action   | Dose   | Rate | Site |
|                                   | Action | Date     |        |      |      |
 
+-----------------------------------+--------+----------+--------+------+------+
|   albuterol 2.5 mg/3 mL nebulizer | Given  | 20 | 2.5 mg |      |      |
|  solution 2.5 mg  2.5 mg,         |        | 13  9:59 |        |      |      |
| Nebulization, ONCE, Thu 13   |        |  AM PST  |        |      |      |
| at 1015, For 1 dose, Clinic       |        |          |        |      |      |
| administered,                     |        |          |        |      |      |
+-----------------------------------+--------+----------+--------+------+------+
 
 
 
+---+---+
|   |   |
+---+---+
 documented in this encounter

## 2020-01-08 NOTE — XMS
Encounter Summary
  Created on: 2020
 
 Viviana Ricci
 External Reference #: [id number]
 : 47
 Sex: Female
 
 Demographics
 
 
+-----------------------+----------------------+
| Address               | [address]      |
|                       | [place]  [address] |
+-----------------------+----------------------+
| Home Phone            | [phone]      |
+-----------------------+----------------------+
| Preferred Language    | Unknown              |
+-----------------------+----------------------+
| Marital Status        | Single               |
+-----------------------+----------------------+
| Sabianist Affiliation | [id number]                 |
+-----------------------+----------------------+
| Race                  | Unknown              |
+-----------------------+----------------------+
| Ethnic Group          | Unknown              |
+-----------------------+----------------------+
 
 
 Author
 
 
+--------------+--------------------------------------------+
| Author       | [facility]  |
|              | and [place]                                |
+--------------+--------------------------------------------+
| Organization | [facility]  |
|              | and [place]                                |
+--------------+--------------------------------------------+
| Address      | Unknown                                    |
+--------------+--------------------------------------------+
| Phone        | Unavailable                                |
+--------------+--------------------------------------------+
 
 
 
 Support
 
 
+----------------+--------------+---------------------+-----------------+
| Name           | Relationship | Address             | Phone           |
+----------------+--------------+---------------------+-----------------+
| Ada/Ed Radhames | ECON         | [place]              | [phone] |
|                |              | [place]  |                 |
|                |              |  [address]              |                 |
+----------------+--------------+---------------------+-----------------+
 
 
 
 
 Care Team Providers
 
 
+-----------------------+------+-------------+
| Care Team Member Name | Role | Phone       |
+-----------------------+------+-------------+
 PCP  | Unavailable |
+-----------------------+------+-------------+
 
 
 
 Reason for Visit
 
 
+-------------------+----------+
| Reason            | Comments |
+-------------------+----------+
| Medication Refill |          |
+-------------------+----------+
 
 
 
 Encounter Details
 
 
+--------+--------+---------------------+----------------------+-------------------+
| Date   | Type   | Department          | Care Team            | Description       |
+--------+--------+---------------------+----------------------+-------------------+
| / | Refill |   [place]         |   Marzena Moser  | Medication Refill |
|    |        | NEPHROLOGY  [address]   | M, DO  [address]      |                   |
|        |        | [address]   | [address]      |                   |
|        |        | [place], [place]     | [place], [place]      |                   |
|        |        | [address]          | [address]  [phone]  |                   |
|        |        | [phone]        |  [phone] (Fax)  |                   |
+--------+--------+---------------------+----------------------+-------------------+
 
 
 
 Social History
 
 
+--------------+-------+-----------+--------+------+
| Tobacco Use  | Types | Packs/Day | Years  | Date |
|              |       |           | Used   |      |
+--------------+-------+-----------+--------+------+
| Never Smoker |       |           |        |      |
+--------------+-------+-----------+--------+------+
 
 
 
+---------------------+---+---+---+
| Smokeless Tobacco:  |   |   |   |
| Never Used          |   |   |   |
+---------------------+---+---+---+
 
 
 
+-------------+-------------+---------+----------+
| Alcohol Use | Drinks/Week | oz/Week | Comments |
 
+-------------+-------------+---------+----------+
| No          |             |         |          |
+-------------+-------------+---------+----------+
 
 
 
+------------------+---------------+
| Sex Assigned at  | Date Recorded |
| Birth            |               |
+------------------+---------------+
| Not on file      |               |
+------------------+---------------+
 
 
 
+----------------+-------------+-------------+
| Job Start Date | Occupation  | Industry    |
+----------------+-------------+-------------+
| Not on file    | Not on file | Not on file |
+----------------+-------------+-------------+
 
 
 
+----------------+--------------+------------+
| Travel History | Travel Start | Travel End |
+----------------+--------------+------------+
 
 
 
+-------------------------------------+
| No recent travel history available. |
+-------------------------------------+
 documented as of this encounter
 
 Plan of Treatment
 
 
+--------+-----------+------------+----------------------+-------------------+
| Date   | Type      | Specialty  | Care Team            | Description       |
+--------+-----------+------------+----------------------+-------------------+
| / | Office    | Cardiology |   Tonia Wilson,    |                   |
|    | Visit     |            | BELKIS  [address]   |                   |
|        |           |            | St  [place], [place]  |                   |
|        |           |            | [id number] [phone]  |                   |
|        |           |            |  [phone] (Fax)  |                   |
+--------+-----------+------------+----------------------+-------------------+
| / | Hospital  | Radiology  |   Cary Himanshuraul, |                   |
|    | Encounter |            |  MD  [address] |                   |
|        |           |            |  St.  [place],   |                   |
|        |           |            | [place] [address]             |                   |
|        |           |            | [phone]         |                   |
|        |           |            | [phone] (Fax)   |                   |
+--------+-----------+------------+----------------------+-------------------+
| / | Surgery   | Radiology  |   Aimeechidi Yinpeeraul, | CV EP PPM SYSTEM  |
|    |           |            |  MD  [address] | IMPLANT           |
|        |           |            |  St.  [place],   |                   |
|        |           |            | [place] [address]             |                   |
|        |           |            | [phone]         |                   |
|        |           |            | [phone] (Fax)   |                   |
+--------+-----------+------------+----------------------+-------------------+
 
| 02/10/ | Clinical  | Cardiology |                      |                   |
|    | Support   |            |                      |                   |
+--------+-----------+------------+----------------------+-------------------+
| / | Office    | Cardiology |   Tonia Wilson,    |                   |
|    | Visit     |            | ARNP  [address]   |                   |
|        |           |            | St  [place], [place]  |                   |
|        |           |            | [address]  [phone]  |                   |
|        |           |            |  [phone] (Fax)  |                   |
+--------+-----------+------------+----------------------+-------------------+
| / | Off-Site  | Nephrology |   Marzena Moser  |                   |
|    | Visit     |            | DO ETIENNE  [address] [place] [address]      |                   |
|        |           |            | [address]      |                   |
|        |           |            | [place]      |                   |
|        |           |            | [address] [phone]  |                   |
|        |           |            |  [phone] (Fax)  |                   |
+--------+-----------+------------+----------------------+-------------------+
 documented as of this encounter
 
 Visit Diagnoses
 Not on filedocumented in this encounter

## 2020-01-08 NOTE — XMS
Encounter Summary
  Created on: 2020
 
 Viviana Ricci
 External Reference #: [id number]
 : 47
 Sex: Female
 
 Demographics
 
 
+-----------------------+----------------------+
| Address               | [address]      |
|                       | [place]  [address] |
+-----------------------+----------------------+
| Home Phone            | [phone]      |
+-----------------------+----------------------+
| Preferred Language    | Unknown              |
+-----------------------+----------------------+
| Marital Status        | Single               |
+-----------------------+----------------------+
| Temple Affiliation | 1009                 |
+-----------------------+----------------------+
| Race                  | Unknown              |
+-----------------------+----------------------+
| Ethnic Group          | Unknown              |
+-----------------------+----------------------+
 
 
 Author
 
 
+--------------+--------------------------------------------+
| Author       | [facility]  |
|              | and [place]                                |
+--------------+--------------------------------------------+
| Organization | [facility]  |
|              | and [place]                                |
+--------------+--------------------------------------------+
| Address      | Unknown                                    |
+--------------+--------------------------------------------+
| Phone        | Unavailable                                |
+--------------+--------------------------------------------+
 
 
 
 Support
 
 
+----------------+--------------+---------------------+-----------------+
| Name           | Relationship | Address             | Phone           |
+----------------+--------------+---------------------+-----------------+
| Ada/Ed Radhames | ECON         | [place]              | [phone] |
|                |              | [place]  |                 |
|                |              |  [address]              |                 |
+----------------+--------------+---------------------+-----------------+
 
 
 
 
 Care Team Providers
 
 
+-----------------------+------+-------------+
| Care Team Member Name | Role | Phone       |
+-----------------------+------+-------------+
 PCP  | Unavailable |
+-----------------------+------+-------------+
 
 
 
 Encounter Details
 
 
+--------+----------+---------------------+----------------------+-------------+
| Date   | Type     | Department          | Care Team            | Description |
+--------+----------+---------------------+----------------------+-------------+
| / | Abstract |   [facility] [place]         |   Marzena Moser  |             |
|    |          | NEPHROLOGY  [address]   | M, DO  [address]      |             |
|        |          | [address]   | [address]      |             |
|        |          | [place], [place]     | [place]      |             |
|        |          | [address]          | [address]  [phone]  |             |
|        |          | [phone]        |  [phone] (Fax)  |             |
+--------+----------+---------------------+----------------------+-------------+
 
 
 
 Social History
 
 
+--------------+-------+-----------+--------+------+
| Tobacco Use  | Types | Packs/Day | Years  | Date |
|              |       |           | Used   |      |
+--------------+-------+-----------+--------+------+
| Never Smoker |       |           |        |      |
+--------------+-------+-----------+--------+------+
 
 
 
+---------------------+---+---+---+
| Smokeless Tobacco:  |   |   |   |
| Never Used          |   |   |   |
+---------------------+---+---+---+
 
 
 
+---------------------------------------------------------------+
| Comments: some second hand smoke exposure, but fairly minimal |
+---------------------------------------------------------------+
 
 
 
+-------------+-------------+---------+----------+
| Alcohol Use | Drinks/Week | oz/Week | Comments |
+-------------+-------------+---------+----------+
| No          |             |         |          |
+-------------+-------------+---------+----------+
 
 
 
 
+------------------+---------------+
| Sex Assigned at  | Date Recorded |
| Birth            |               |
+------------------+---------------+
| Not on file      |               |
+------------------+---------------+
 
 
 
+----------------+-------------+-------------+
| Job Start Date | Occupation  | Industry    |
+----------------+-------------+-------------+
| Not on file    | Not on file | Not on file |
+----------------+-------------+-------------+
 
 
 
+----------------+--------------+------------+
| Travel History | Travel Start | Travel End |
+----------------+--------------+------------+
 
 
 
+-------------------------------------+
| No recent travel history available. |
+-------------------------------------+
 documented as of this encounter
 
 Plan of Treatment
 
 
+--------+-----------+------------+----------------------+-------------------+
| Date   | Type      | Specialty  | Care Team            | Description       |
+--------+-----------+------------+----------------------+-------------------+
| / | Office    | Cardiology |   Tonia Wilson,    |                   |
|    | Visit     |            | ARNJESSICA  [address]   |                   |
|        |           |            | [place]  |                   |
|        |           |            | [address]  [phone]  |                   |
|        |           |            |  [phone] (Fax)  |                   |
+--------+-----------+------------+----------------------+-------------------+
| / | Hospital  | Radiology  |   Popeye Townsend, |                   |
|    | Encounter |            |  MD  [address] |                   |
|        |           |            |  St.  [place],   |                   |
|        |           |            | [place] [address]             |                   |
|        |           |            | [phone]         |                   |
|        |           |            | [phone] (Fax)   |                   |
+--------+-----------+------------+----------------------+-------------------+
| / | Surgery   | Radiology  |   Popeye Townsend, | CV EP PPM SYSTEM  |
|    |           |            |  MD  [address] | IMPLANT           |
|        |           |            |  St.  [place],   |                   |
|        |           |            | [place] [address]             |                   |
|        |           |            | [phone]         |                   |
|        |           |            | [phone] (Fax)   |                   |
+--------+-----------+------------+----------------------+-------------------+
| 02/10/ | Clinical  | Cardiology |                      |                   |
|    | Support   |            |                      |                   |
+--------+-----------+------------+----------------------+-------------------+
| / | Office    | Cardiology |   Tonia Wilson,    |                   |
|    | Visit     |            | ARNP  [address]   |                   |
 
|        |           |            | St  [place], [place]  |                   |
|        |           |            | [address]  [phone]  |                   |
|        |           |            |  [phone] (Fax)  |                   |
+--------+-----------+------------+----------------------+-------------------+
| / | Off-Site  | Nephrology |   Marzena Moesr  |                   |
|    | Visit     |            | DO ETIENNE  [address] [place] [address]      |                   |
|        |           |            | [address]      |                   |
|        |           |            | [place]      |                   |
|        |           |            | [address]  [phone]  |                   |
|        |           |            |  [phone] (Fax)  |                   |
+--------+-----------+------------+----------------------+-------------------+
 documented as of this encounter
 
 Procedures
 
 
+----------------+--------+------------+----------------------+----------------------+
| Procedure Name | Priori | Date/Time  | Associated Diagnosis | Comments             |
|                | ty     |            |                      |                      |
+----------------+--------+------------+----------------------+----------------------+
| EXTERNAL LAB:  | Routin | 2015 |                      |   Results for this   |
| URINALYSIS     | e      |            |                      | procedure are in the |
|                |        |            |                      |  results section.    |
+----------------+--------+------------+----------------------+----------------------+
| URINALYSIS     | Routin | 2015 |                      |   Results for this   |
|                | e      |            |                      | procedure are in the |
|                |        |            |                      |  results section.    |
+----------------+--------+------------+----------------------+----------------------+
 documented in this encounter
 
 Results
 Urinalysis (2015)
 
+-----------+--------------------+------------+-------------+--------------+
| Component | Value              | Ref Range  | Performed   | Pathologist  |
|           |                    |            | At          | Signature    |
+-----------+--------------------+------------+-------------+--------------+
| WBC UA    | 50 (A)Comment: >50 | 0 - 4 /HPF | [facility]  |              |
|           |                    |            | [facility]    |              |
|           |                    |            | [facility]     |              |
|           |                    |            | [facility] -    |              |
|           |                    |            | LABORATORY  |              |
+-----------+--------------------+------------+-------------+--------------+
 
 
 
+-----------------+
| Specimen        |
+-----------------+
| Urine specimen  |
| (specimen)      |
+-----------------+
 
 
 
+----------------------+--------------------+--------------------+----------------+
| Performing           | Address            | City/State/Zipcode | Phone Number   |
| Organization         |                    |                    |                |
+----------------------+--------------------+--------------------+----------------+
|   [facility]     |   [address] | [place]    |   [phone] |
 
| [facility]  |                    | [address]              |                |
| - LABORATORY         |                    |                    |                |
+----------------------+--------------------+--------------------+----------------+
 External Lab: Urinalysis (2015)
 
+-------------+----------+-----------+------------+--------------+
| Component   | Value    | Ref Range | Performed  | Pathologist  |
|             |          |           | At         | Signature    |
+-------------+----------+-----------+------------+--------------+
| UA Blood,   | negative |           | REFERENCE  |              |
| External    |          |           | LAB [facility]  |              |
|             |          |           | [facility]      |              |
|             |          |           | [facility]    |              |
|             |          |           | [facility]   |              |
+-------------+----------+-----------+------------+--------------+
| UA Glucose, | normal   |           | REFERENCE  |              |
|  External   |          |           | LAB [facility]  |              |
|             |          |           | [facility]      |              |
|             |          |           | [facility]    |              |
|             |          |           | [facility]   |              |
+-------------+----------+-----------+------------+--------------+
| UA Ketones, | negative |           | REFERENCE  |              |
|  External   |          |           | LAB [facility]  |              |
|             |          |           | [facility]      |              |
|             |          |           | [facility]    |              |
|             |          |           | [facility]   |              |
+-------------+----------+-----------+------------+--------------+
| UA Ph,      | 7        |           | REFERENCE  |              |
| External    |          |           | LAB [facility]  |              |
|             |          |           | [facility]      |              |
|             |          |           | [facility]    |              |
|             |          |           | [facility]   |              |
+-------------+----------+-----------+------------+--------------+
| UA          | 75       |           | REFERENCE  |              |
| Proteins,   |          |           | LAB [facility]  |              |
| External    |          |           | [facility]      |              |
|             |          |           | [facility]    |              |
|             |          |           | [facility]   |              |
+-------------+----------+-----------+------------+--------------+
| UA RBC,     | 0        |           | REFERENCE  |              |
| External    |          |           | LAB [facility]  |              |
|             |          |           | [facility]      |              |
|             |          |           | [facility]    |              |
|             |          |           | [facility]   |              |
+-------------+----------+-----------+------------+--------------+
| UA Specific | 1.013    |           | REFERENCE  |              |
|  Gravity,   |          |           | LAB [facility]  |              |
| External    |          |           | [facility]      |              |
|             |          |           | [facility]    |              |
|             |          |           | [facility]OS[facility]ITAL   |              |
+-------------+----------+-----------+------------+--------------+
| UA          | 500      |           | REFERENCE  |              |
| Leukocyte   |          |           | LAB [facility]  |              |
| Esterase,   |          |           | [facility]      |              |
| External    |          |           | [facility]    |              |
|             |          |           | HOSPITAL   |              |
+-------------+----------+-----------+------------+--------------+
 
 
 
 
+----------------------+---------+--------------------+--------------+
| Performing           | Address | City/State/Zipcode | Phone Number |
| Organization         |         |                    |              |
+----------------------+---------+--------------------+--------------+
|   [facility]      |         |                    |              |
| [facility]  |         |                    |              |
| HOSPITAL             |         |                    |              |
+----------------------+---------+--------------------+--------------+
 documented in this encounter
 
 Visit Diagnoses
 Not on filedocumented in this encounter

## 2020-01-08 NOTE — XMS
Encounter Summary
  Created on: 2020
 
 Viviana Ricci
 External Reference #: [id number]
 : 47
 Sex: Female
 
 Demographics
 
 
+-----------------------+----------------------+
| Address               | [address]      |
|                       | [place]  [address] |
+-----------------------+----------------------+
| Home Phone            | [phone]      |
+-----------------------+----------------------+
| Preferred Language    | Unknown              |
+-----------------------+----------------------+
| Marital Status        | Single               |
+-----------------------+----------------------+
| Religion Affiliation | 1009                 |
+-----------------------+----------------------+
| Race                  | Unknown              |
+-----------------------+----------------------+
| Ethnic Group          | Unknown              |
+-----------------------+----------------------+
 
 
 Author
 
 
+--------------+--------------------------------------------+
| Author       | [facility]  |
|              | and [place]                                |
+--------------+--------------------------------------------+
| Organization | [facility] [place]  |
|              | and [place]                                |
+--------------+--------------------------------------------+
| Address      | Unknown                                    |
+--------------+--------------------------------------------+
| Phone        | Unavailable                                |
+--------------+--------------------------------------------+
 
 
 
 Support
 
 
+----------------+--------------+---------------------+-----------------+
| Name           | Relationship | Address             | Phone           |
+----------------+--------------+---------------------+-----------------+
| Ada/Ed Radhames | ECON         | [place]              | [phone] |
|                |              | [place]  |                 |
|                |              |  [address]              |                 |
+----------------+--------------+---------------------+-----------------+
 
 
 
 
 Care Team Providers
 
 
+-----------------------+------+-------------+
| Care Team Member Name | Role | Phone       |
+-----------------------+------+-------------+
 PCP  | Unavailable |
+-----------------------+------+-------------+
 
 
 
 Reason for Visit
 
 
+-------------------+----------+
| Reason            | Comments |
+-------------------+----------+
| Medication Refill |          |
+-------------------+----------+
 
 
 
 Encounter Details
 
 
+--------+--------+---------------------+----------------------+-------------------+
| Date   | Type   | Department          | Care Team            | Description       |
+--------+--------+---------------------+----------------------+-------------------+
| / | Refill |   [facility]         |   Marzena Moser  | Medication Refill |
|    |        | NEPHROLOGY  [address]   | M, DO  [address]      |                   |
|        |        | [address]   | [address]      |                   |
|        |        | [place], [place]     | [place], [place]      |                   |
|        |        | [address]          | [address]  [phone]  |                   |
|        |        | [phone]        |  [phone] (Fax)  |                   |
+--------+--------+---------------------+----------------------+-------------------+
 
 
 
 Social History
 
 
+--------------+-------+-----------+--------+------+
| Tobacco Use  | Types | Packs/Day | Years  | Date |
|              |       |           | Used   |      |
+--------------+-------+-----------+--------+------+
| Never Smoker |       |           |        |      |
+--------------+-------+-----------+--------+------+
 
 
 
+---------------------+---+---+---+
| Smokeless Tobacco:  |   |   |   |
| Never Used          |   |   |   |
+---------------------+---+---+---+
 
 
 
+---------------------------------------------------------------+
| Comments: some second hand smoke exposure, but fairly minimal |
 
+---------------------------------------------------------------+
 
 
 
+-------------+-------------+---------+----------+
| Alcohol Use | Drinks/Week | oz/Week | Comments |
+-------------+-------------+---------+----------+
| No          |             |         |          |
+-------------+-------------+---------+----------+
 
 
 
+------------------+---------------+
| Sex Assigned at  | Date Recorded |
| Birth            |               |
+------------------+---------------+
| Not on file      |               |
+------------------+---------------+
 
 
 
+----------------+-------------+-------------+
| Job Start Date | Occupation  | Industry    |
+----------------+-------------+-------------+
| Not on file    | Not on file | Not on file |
+----------------+-------------+-------------+
 
 
 
+----------------+--------------+------------+
| Travel History | Travel Start | Travel End |
+----------------+--------------+------------+
 
 
 
+-------------------------------------+
| No recent travel history available. |
+-------------------------------------+
 documented as of this encounter
 
 Plan of Treatment
 
 
+--------+-----------+------------+----------------------+-------------------+
| Date   | Type      | Specialty  | Care Team            | Description       |
+--------+-----------+------------+----------------------+-------------------+
| / | Office    | Cardiology |   HellalfredoTonia,    |                   |
|    | Visit     |            | ARNP  [address]   |                   |
|        |           |            | St  [place], [place]  |                   |
|        |           |            | [address]  [phone]  |                   |
|        |           |            |  [phone] (Fax)  |                   |
+--------+-----------+------------+----------------------+-------------------+
| / | Hospital  | Radiology  |   Popeye Townsend, |                   |
|    | Encounter |            |  MD  [address] |                   |
|        |           |            |  St.  [place],   |                   |
|        |           |            | [place] [address]             |                   |
|        |           |            | [phone]         |                   |
|        |           |            | [phone] (Fax)   |                   |
+--------+-----------+------------+----------------------+-------------------+
| / | Surgery   | Radiology  |   Popeye Townsend, | CV EP PPM SYSTEM  |
 
|    |           |            |  MD  [address] | IMPLANT           |
|        |           |            |  St.  [place],   |                   |
|        |           |            | [place] [address]             |                   |
|        |           |            | [phone]         |                   |
|        |           |            | [phone] (Fax)   |                   |
+--------+-----------+------------+----------------------+-------------------+
| 02/10/ | Clinical  | Cardiology |                      |                   |
|    | Support   |            |                      |                   |
+--------+-----------+------------+----------------------+-------------------+
| / | Office    | Cardiology |   Tonia Wilson,    |                   |
|    | Visit     |            | ARNP  [address]   |                   |
|        |           |            | [place]  |                   |
|        |           |            | [address]  [phone]  |                   |
|        |           |            |  [phone] (Fax)  |                   |
+--------+-----------+------------+----------------------+-------------------+
| / | Off-Site  | Nephrology |   Marzena Moser  |                   |
|    | Visit     |            | DO ETIENNE  [address] [place] [address]      |                   |
|        |           |            | [address]      |                   |
|        |           |            | [place]      |                   |
|        |           |            | [address]  [phone]  |                   |
|        |           |            |  [phone] (Fax)  |                   |
+--------+-----------+------------+----------------------+-------------------+
 documented as of this encounter
 
 Visit Diagnoses
 Not on filedocumented in this encounter

## 2020-01-08 NOTE — XMS
Encounter Summary
  Created on: 2020
 
 Viviana Ricci
 External Reference #: [id number]
 : 47
 Sex: Female
 
 Demographics
 
 
+-----------------------+----------------------+
| Address               | [address]      |
|                       | [place]  [address] |
+-----------------------+----------------------+
| Home Phone            | [phone]      |
+-----------------------+----------------------+
| Preferred Language    | Unknown              |
+-----------------------+----------------------+
| Marital Status        | Single               |
+-----------------------+----------------------+
| Quaker Affiliation | [id number]                 |
+-----------------------+----------------------+
| Race                  | Unknown              |
+-----------------------+----------------------+
| Ethnic Group          | Unknown              |
+-----------------------+----------------------+
 
 
 Author
 
 
+--------------+--------------------------------------------+
| Author       | [facility] [place]  |
|              | and [place]                                |
+--------------+--------------------------------------------+
| Organization | [facility] [place]  |
|              | and [place]                                |
+--------------+--------------------------------------------+
| Address      | Unknown                                    |
+--------------+--------------------------------------------+
| Phone        | Unavailable                                |
+--------------+--------------------------------------------+
 
 
 
 Support
 
 
+----------------+--------------+---------------------+-----------------+
| Name           | Relationship | Address             | Phone           |
+----------------+--------------+---------------------+-----------------+
| Ada/Ed Radhames | ECON         | [address]              | [phone] |
|                |              | [place]  |                 |
|                |              |  [address]              |                 |
+----------------+--------------+---------------------+-----------------+
 
 
 
 
 Care Team Providers
 
 
+-----------------------+------+-------------+
| Care Team Member Name | Role | Phone       |
+-----------------------+------+-------------+
 PCP  | Unavailable |
+-----------------------+------+-------------+
 
 
 
 Encounter Details
 
 
+--------+-----------+----------------------+----------------------+-------------+
| Date   | Type      | Department           | Care Team            | Description |
+--------+-----------+----------------------+----------------------+-------------+
| / | [facility]  |   [facility] |   Marzena Moser  |             |
|    | Encounter |  MED CTR XRAY  [address] | M, DO  [address]      |             |
|        |           |  [place]       | [address]      |             |
|        |           | [place] [address] | [place]      |             |
|        |           |   [phone]       | [address] [phone]  |             |
|        |           |                      |  [phone] (Fax)  |             |
+--------+-----------+----------------------+----------------------+-------------+
 
 
 
 Social History
 
 
+----------------+-------+-----------+--------+------+
| Tobacco Use    | Types | Packs/Day | Years  | Date |
|                |       |           | Used   |      |
+----------------+-------+-----------+--------+------+
| Never Assessed |       |           |        |      |
+----------------+-------+-----------+--------+------+
 
 
 
+------------------+---------------+
| Sex Assigned at  | Date Recorded |
| Birth            |               |
+------------------+---------------+
| Not on file      |               |
+------------------+---------------+
 
 
 
+----------------+-------------+-------------+
| Job Start Date | Occupation  | Industry    |
+----------------+-------------+-------------+
| Not on file    | Not on file | Not on file |
+----------------+-------------+-------------+
 
 
 
+----------------+--------------+------------+
| Travel History | Travel Start | Travel End |
+----------------+--------------+------------+
 
 
 
 
+-------------------------------------+
| No recent travel history available. |
+-------------------------------------+
 documented as of this encounter
 
 Plan of Treatment
 
 
+--------+-----------+------------+----------------------+-------------------+
| Date   | Type      | Specialty  | Care Team            | Description       |
+--------+-----------+------------+----------------------+-------------------+
| / | Office    | Cardiology |   Tonia Wilson,    |                   |
|    | Visit     |            | ARNJESSICA  [address]   |                   |
|        |           |            | St  [place], [place]  |                   |
|        |           |            | [address]  [phone]  |                   |
|        |           |            |  [phone] (Fax)  |                   |
+--------+-----------+------------+----------------------+-------------------+
| / | Hospital  | Radiology  |   Popeye Townsend, |                   |
|    | Encounter |            |  MD  [address] |                   |
|        |           |            |  St. [place],   |                   |
|        |           |            | [place] [address]             |                   |
|        |           |            | [phone]         |                   |
|        |           |            | [phone] (Fax)   |                   |
+--------+-----------+------------+----------------------+-------------------+
| / | Surgery   | Radiology  |   Popeye Townsend, | CV EP PPM SYSTEM  |
|    |           |            |  MD  [address] | IMPLANT           |
|        |           |            |  [place],   |                   |
|        |           |            | [place] [address]             |                   |
|        |           |            | [phone]         |                   |
|        |           |            | [phone] (Fax)   |                   |
+--------+-----------+------------+----------------------+-------------------+
| 02/10/ | Clinical  | Cardiology |                      |                   |
|    | Support   |            |                      |                   |
+--------+-----------+------------+----------------------+-------------------+
| / | Office    | Cardiology |   Tonia Wilson,    |                   |
|    | Visit     |            | BELKIS  [address]   |                   |
|        |           |            | [place]  |                   |
|        |           |            | [address]  [phone]  |                   |
|        |           |            |  [phone] (Fax)  |                   |
+--------+-----------+------------+----------------------+-------------------+
| / | Off-Site  | Nephrology |   Marzena Moser  |                   |
|    | Visit     |            | DO ETIENNE  [address] [place] [address]      |                   |
|        |           |            | [address]      |                   |
|        |           |            | [place]      |                   |
|        |           |            | [address] [phone]  |                   |
|        |           |            |  [phone] (Fax)  |                   |
+--------+-----------+------------+----------------------+-------------------+
 documented as of this encounter
 
 Visit Diagnoses
 Not on filedocumented in this encounter

## 2020-01-08 NOTE — XMS
Encounter Summary
  Created on: 2020
 
 Viviana Ricci
 External Reference #: [id number]
 : 47
 Sex: Female
 
 Demographics
 
 
+-----------------------+----------------------+
| Address               | [address]      |
|                       | [place]  [address] |
+-----------------------+----------------------+
| Home Phone            | [phone]      |
+-----------------------+----------------------+
| Preferred Language    | Unknown              |
+-----------------------+----------------------+
| Marital Status        | Single               |
+-----------------------+----------------------+
| Orthodoxy Affiliation | 1009                 |
+-----------------------+----------------------+
| Race                  | Unknown              |
+-----------------------+----------------------+
| Ethnic Group          | Unknown              |
+-----------------------+----------------------+
 
 
 Author
 
 
+--------------+--------------------------------------------+
| Author       |  and [facility]  |
|              | and [place]                                |
+--------------+--------------------------------------------+
| Organization |  and [facility]  |
|              | and [place]                                |
+--------------+--------------------------------------------+
| Address      | Unknown                                    |
+--------------+--------------------------------------------+
| Phone        | Unavailable                                |
+--------------+--------------------------------------------+
 
 
 
 Support
 
 
+----------------+--------------+---------------------+-----------------+
| Name           | Relationship | Address             | Phone           |
+----------------+--------------+---------------------+-----------------+
| Ada/Ed Radhames | ECON         | [address]              | [phone] |
|                |              | [place]  |                 |
|                |              |  [address]              |                 |
+----------------+--------------+---------------------+-----------------+
 
 
 
 
 Care Team Providers
 
 
+-----------------------+------+-------------+
| Care Team Member Name | Role | Phone       |
+-----------------------+------+-------------+
 PCP  | Unavailable |
+-----------------------+------+-------------+
 
 
 
 Reason for Visit
 
 
+--------------------+---------------------+
| Reason             | Comments            |
+--------------------+---------------------+
| Medication Problem | Tacrolimus too high |
+--------------------+---------------------+
 
 
 
 Encounter Details
 
 
+--------+--------+---------------------+----------------------+---------------------+
| Date   | Type   | Department          | Care Team            | Description         |
+--------+--------+---------------------+----------------------+---------------------+
| 10/02/ | Refill |   [facility]         |   Marzena Moser  | Medication Problem  |
|    |        | NEPHROLOGY  [address]   | M, DO  [address]      | (Tacrolimus too     |
|        |        | [address]   | [address]      | high)               |
|        |        | [place], [place]     | [place]      |                     |
|        |        | [address]          | [address]  [phone]  |                     |
|        |        | [phone]        |  [phone] (Fax)  |                     |
+--------+--------+---------------------+----------------------+---------------------+
 
 
 
 Social History
 
 
+----------------+-------+-----------+--------+------+
| Tobacco Use    | Types | Packs/Day | Years  | Date |
|                |       |           | Used   |      |
+----------------+-------+-----------+--------+------+
| Never Assessed |       |           |        |      |
+----------------+-------+-----------+--------+------+
 
 
 
+------------------+---------------+
| Sex Assigned at  | Date Recorded |
| Birth            |               |
+------------------+---------------+
| Not on file      |               |
+------------------+---------------+
 
 
 
 
+----------------+-------------+-------------+
| Job Start Date | Occupation  | Industry    |
+----------------+-------------+-------------+
| Not on file    | Not on file | Not on file |
+----------------+-------------+-------------+
 
 
 
+----------------+--------------+------------+
| Travel History | Travel Start | Travel End |
+----------------+--------------+------------+
 
 
 
+-------------------------------------+
| No recent travel history available. |
+-------------------------------------+
 documented as of this encounter
 
 Plan of Treatment
 
 
+--------+-----------+------------+----------------------+-------------------+
| Date   | Type      | Specialty  | Care Team            | Description       |
+--------+-----------+------------+----------------------+-------------------+
| / | Office    | Cardiology |   Tonia Wilson,    |                   |
2020   | Visit     |            | ARNP  [address]   |                   |
|        |           |            | [place]  |                   |
|        |           |            | [address]  [phone]  |                   |
|        |           |            |  [phone] (Fax)  |                   |
+--------+-----------+------------+----------------------+-------------------+
| / | Hospital  | Radiology  |   Popeye Townsend, |                   |
|    | Encounter |            |  MD  [address] |                   |
|        |           |            |  [place]. [place]   |                   |
|        |           |            | [place] [address]             |                   |
|        |           |            | [phone]         |                   |
|        |           |            | [phone] (Fax)   |                   |
+--------+-----------+------------+----------------------+-------------------+
| / | Surgery   | Radiology  |   Popeye Townsend, | CV EP PPM SYSTEM  |
2020   |           |            |  MD  [address] | IMPLANT           |
|        |           |            |  St.  [place],   |                   |
|        |           |            | [place] [address]             |                   |
|        |           |            | [phone]         |                   |
|        |           |            | [phone] (Fax)   |                   |
+--------+-----------+------------+----------------------+-------------------+
| 02/10/ | Clinical  | Cardiology |                      |                   |
|    | Support   |            |                      |                   |
+--------+-----------+------------+----------------------+-------------------+
| / | Office    | Cardiology |   Tonia Wilson,    |                   |
|    | Visit     |            | [facility]  [address]   |                   |
|        |           |            | [place]  |                   |
|        |           |            | [address]  [phone]  |                   |
|        |           |            |  [phone] (Fax)  |                   |
+--------+-----------+------------+----------------------+-------------------+
| / | Off-Site  | Nephrology |   Marzena Moser  |                   |
2020   | Visit     |            | DO ETIENNE  [address]      |                   |
|        |           |            | [address]      |                   |
|        |           |            | [place]      |                   |
|        |           |            | [address]  [phone]  |                   |
|        |           |            |  [phone] (Fax)  |                   |
 
+--------+-----------+------------+----------------------+-------------------+
 documented as of this encounter
 
 Visit Diagnoses
 Not on filedocumented in this encounter

## 2020-01-08 NOTE — XMS
Encounter Summary
  Created on: 2020
 
 Viviana Ricci
 External Reference #: [id number]
 : 47
 Sex: Female
 
 Demographics
 
 
+-----------------------+----------------------+
| Address               | [address]      |
|                       | [place]  [address] |
+-----------------------+----------------------+
| Home Phone            | [phone]      |
+-----------------------+----------------------+
| Preferred Language    | Unknown              |
+-----------------------+----------------------+
| Marital Status        | Single               |
+-----------------------+----------------------+
| Christianity Affiliation | 1009                 |
+-----------------------+----------------------+
| Race                  | Unknown              |
+-----------------------+----------------------+
| Ethnic Group          | Unknown              |
+-----------------------+----------------------+
 
 
 Author
 
 
+--------------+--------------------------------------------+
| Author       | [facility]  |
|              | and [place]                                |
+--------------+--------------------------------------------+
| Organization | [facility]  |
|              | and [place]                                |
+--------------+--------------------------------------------+
| Address      | Unknown                                    |
+--------------+--------------------------------------------+
| Phone        | Unavailable                                |
+--------------+--------------------------------------------+
 
 
 
 Support
 
 
+----------------+--------------+---------------------+-----------------+
| Name           | Relationship | Address             | Phone           |
+----------------+--------------+---------------------+-----------------+
| Ada/Ed Radhames | ECON         | [address]              | [phone] |
|                |              | [place]  |                 |
|                |              |  [address]              |                 |
+----------------+--------------+---------------------+-----------------+
 
 
 
 
 Care Team Providers
 
 
+-----------------------+------+-------------+
| Care Team Member Name | Role | Phone       |
+-----------------------+------+-------------+
 PCP  | Unavailable |
+-----------------------+------+-------------+
 
 
 
 Reason for Visit
 
 
+--------+----------+
| Reason | Comments |
+--------+----------+
| Other  |          |
+--------+----------+
 
 
 
 Encounter Details
 
 
+--------+-----------+----------------------+----------------------+-------------+
| Date   | Type      | Department           | Care Team            | Description |
+--------+-----------+----------------------+----------------------+-------------+
| / | Telephone |   [facility] [place]          |   Maricruz Flanagan, | Other       |
|    |           | PULMONARY  [address]     |  RN                  |             |
|        |           | [address]  [place], |                      |             |
|        |           |  [place] [address]       |                      |             |
|        |           | [phone]         |                      |             |
+--------+-----------+----------------------+----------------------+-------------+
 
 
 
 Social History
 
 
+--------------+-------+-----------+--------+------+
| Tobacco Use  | Types | Packs/Day | Years  | Date |
|              |       |           | Used   |      |
+--------------+-------+-----------+--------+------+
| Never Smoker |       |           |        |      |
+--------------+-------+-----------+--------+------+
 
 
 
+---------------------+---+---+---+
| Smokeless Tobacco:  |   |   |   |
| Never Used          |   |   |   |
+---------------------+---+---+---+
 
 
 
+---------------------------------------------------------------+
| Comments: some second hand smoke exposure, but fairly minimal |
+---------------------------------------------------------------+
 
 
 
 
+-------------+-------------+---------+----------+
| Alcohol Use | Drinks/Week | oz/Week | Comments |
+-------------+-------------+---------+----------+
| No          |             |         |          |
+-------------+-------------+---------+----------+
 
 
 
+------------------+---------------+
| Sex Assigned at  | Date Recorded |
| Birth            |               |
+------------------+---------------+
| Not on file      |               |
+------------------+---------------+
 
 
 
+----------------+-------------+-------------+
| Job Start Date | Occupation  | Industry    |
+----------------+-------------+-------------+
| Not on file    | Not on file | Not on file |
+----------------+-------------+-------------+
 
 
 
+----------------+--------------+------------+
| Travel History | Travel Start | Travel End |
+----------------+--------------+------------+
 
 
 
+-------------------------------------+
| No recent travel history available. |
+-------------------------------------+
 documented as of this encounter
 
 Plan of Treatment
 
 
+--------+-----------+------------+----------------------+-------------------+
| Date   | Type      | Specialty  | Care Team            | Description       |
+--------+-----------+------------+----------------------+-------------------+
| / | Office    | Cardiology |   Tonia Wilson,    |                   |
|    | Visit     |            | ARNP  [address]   |                   |
|        |           |            | St  [place] [facility], [place]  |                   |
|        |           |            | [address]  [phone]  |                   |
|        |           |            |  [phone] (Fax)  |                   |
+--------+-----------+------------+----------------------+-------------------+
| / | Hospital  | Radiology  |   Popeye Townsend, |                   |
|    | Encounter |            |  MD  [address] |                   |
|        |           |            |  St.  [place],   |                   |
|        |           |            | [place] [address]             |                   |
|        |           |            | [phone]         |                   |
|        |           |            | [phone] (Fax)   |                   |
+--------+-----------+------------+----------------------+-------------------+
| / | Surgery   | Radiology  |   Popeye Townsend, | CV EP PPM SYSTEM  |
|    |           |            |  MD  [address] | IMPLANT           |
 
|        |           |            |  St.  [place],   |                   |
|        |           |            | [place] [address]             |                   |
|        |           |            | [phone]         |                   |
|        |           |            | [phone] (Fax)   |                   |
+--------+-----------+------------+----------------------+-------------------+
| 02/10/ | Clinical  | Cardiology |                      |                   |
|    | Support   |            |                      |                   |
+--------+-----------+------------+----------------------+-------------------+
| / | Office    | Cardiology |   Hellberg, Tonia,    |                   |
|    | Visit     |            | [facility]  [address]   |                   |
|        |           |            | [place]  |                   |
|        |           |            | [address]  [phone]  |                   |
|        |           |            |  [phone] (Fax)  |                   |
+--------+-----------+------------+----------------------+-------------------+
| / | Off-Site  | Nephrology |   Marzena Moser  |                   |
|    | Visit     |            | DO ETIENNE  [address] [place] [address]      |                   |
|        |           |            | [address]      |                   |
|        |           |            | [place]      |                   |
|        |           |            | [address] [phone]  |                   |
|        |           |            |  [phone] (Fax)  |                   |
+--------+-----------+------------+----------------------+-------------------+
 documented as of this encounter
 
 Visit Diagnoses
 Not on filedocumented in this encounter

## 2020-01-08 NOTE — XMS
Encounter Summary
  Created on: 2020
 
 Viviana Ricci
 External Reference #: [id number]
 : 47
 Sex: Female
 
 Demographics
 
 
+-----------------------+----------------------+
| Address               | [address]      |
|                       | [place]  [address] |
+-----------------------+----------------------+
| Home Phone            | [phone]      |
+-----------------------+----------------------+
| Preferred Language    | Unknown              |
+-----------------------+----------------------+
| Marital Status        | Single               |
+-----------------------+----------------------+
| Hinduism Affiliation | 1009                 |
+-----------------------+----------------------+
| Race                  | Unknown              |
+-----------------------+----------------------+
| Ethnic Group          | Unknown              |
+-----------------------+----------------------+
 
 
 Author
 
 
+--------------+--------------------------------------------+
| Author       | [facility]  |
|              | and [place]                                |
+--------------+--------------------------------------------+
| Organization | [facility]  |
|              | and [place]                                |
+--------------+--------------------------------------------+
| Address      | Unknown                                    |
+--------------+--------------------------------------------+
| Phone        | Unavailable                                |
+--------------+--------------------------------------------+
 
 
 
 Support
 
 
+----------------+--------------+---------------------+-----------------+
| Name           | Relationship | Address             | Phone           |
+----------------+--------------+---------------------+-----------------+
| Ada/Ed Radhames | ECON         | [address]              | [phone] |
|                |              | [place]  |                 |
|                |              |  [address]              |                 |
+----------------+--------------+---------------------+-----------------+
 
 
 
 
 Care Team Providers
 
 
+-----------------------+------+-------------+
| Care Team Member Name | Role | Phone       |
+-----------------------+------+-------------+
 PCP  | Unavailable |
+-----------------------+------+-------------+
 
 
 
 Encounter Details
 
 
+--------+-----------+----------------------+----------------------+-------------+
| Date   | Type      | Department           | Care Team            | Description |
+--------+-----------+----------------------+----------------------+-------------+
| / | [facility]  |   [facility] |   Marzena Moser  |             |
|  - | Encounter |  MED CTR XRAY  [address] | M, DO  [address]      |             |
|        |           |  [place]       | [address]      |             |
| / |           | [place] [address] | [place]      |             |
|    |           |   [phone]       | [address]  [phone]  |             |
|        |           |                      |  [phone] (Fax)  |             |
+--------+-----------+----------------------+----------------------+-------------+
 
 
 
 Social History
 
 
+----------------+-------+-----------+--------+------+
| Tobacco Use    | Types | Packs/Day | Years  | Date |
|                |       |           | Used   |      |
+----------------+-------+-----------+--------+------+
| Never Assessed |       |           |        |      |
+----------------+-------+-----------+--------+------+
 
 
 
+------------------+---------------+
| Sex Assigned at  | Date Recorded |
| Birth            |               |
+------------------+---------------+
| Not on file      |               |
+------------------+---------------+
 
 
 
+----------------+-------------+-------------+
| Job Start Date | Occupation  | Industry    |
+----------------+-------------+-------------+
| Not on file    | Not on file | Not on file |
+----------------+-------------+-------------+
 
 
 
+----------------+--------------+------------+
| Travel History | Travel Start | Travel End |
+----------------+--------------+------------+
 
 
 
 
+-------------------------------------+
| No recent travel history available. |
+-------------------------------------+
 documented as of this encounter
 
 Plan of Treatment
 
 
+--------+-----------+------------+----------------------+-------------------+
| Date   | Type      | Specialty  | Care Team            | Description       |
+--------+-----------+------------+----------------------+-------------------+
| / | Office    | Cardiology |   Tonia Wilson,    |                   |
|    | Visit     |            | ARNJESSICA  [address]   |                   |
|        |           |            | St  [place], [place]  |                   |
|        |           |            | [address]  [phone]  |                   |
|        |           |            |  [phone] (Fax)  |                   |
+--------+-----------+------------+----------------------+-------------------+
| / | Hospital  | Radiology  |   Popeye Townsend, |                   |
|    | Encounter |            |  MD  [address] |                   |
|        |           |            |  St.  [place],   |                   |
|        |           |            | [place] [address]             |                   |
|        |           |            | [phone]         |                   |
|        |           |            | [phone] (Fax)   |                   |
+--------+-----------+------------+----------------------+-------------------+
| / | Surgery   | Radiology  |   Popeye Townsend, | CV EP PPM SYSTEM  |
|    |           |            |  MD  [address] | IMPLANT           |
|        |           |            |  St.  [place],   |                   |
|        |           |            | [place] [address]             |                   |
|        |           |            | [phone]         |                   |
|        |           |            | [phone] (Fax)   |                   |
+--------+-----------+------------+----------------------+-------------------+
| 02/10/ | Clinical  | Cardiology |                      |                   |
|    | Support   |            |                      |                   |
+--------+-----------+------------+----------------------+-------------------+
| / | Office    | Cardiology |   Tonia Wilson,    |                   |
|    | Visit     |            | ARNP  [address]   |                   |
|        |           |            | [place]  |                   |
|        |           |            | [address]  [phone]  |                   |
|        |           |            |  [phone] (Fax)  |                   |
+--------+-----------+------------+----------------------+-------------------+
| / | Off-Site  | Nephrology |   Marzena Moser  |                   |
|    | Visit     |            | DO ETIENNE  [address] [place] [address]      |                   |
|        |           |            | [address]      |                   |
|        |           |            | [place]      |                   |
|        |           |            | [address] [phone]  |                   |
|        |           |            |  [phone] (Fax)  |                   |
+--------+-----------+------------+----------------------+-------------------+
 documented as of this encounter
 
 Visit Diagnoses
 Not on filedocumented in this encounter

## 2020-01-08 NOTE — XMS
Encounter Summary
  Created on: 2020
 
 Viviana Ricci
 External Reference #: [id number]
 : 47
 Sex: Female
 
 Demographics
 
 
+-----------------------+----------------------+
| Address               | [address]      |
|                       | [place]  [address] |
+-----------------------+----------------------+
| Home Phone            | [phone]      |
+-----------------------+----------------------+
| Preferred Language    | Unknown              |
+-----------------------+----------------------+
| Marital Status        | Single               |
+-----------------------+----------------------+
| Zoroastrian Affiliation | [id number]                 |
+-----------------------+----------------------+
| Race                  | Unknown              |
+-----------------------+----------------------+
| Ethnic Group          | Unknown              |
+-----------------------+----------------------+
 
 
 Author
 
 
+--------------+--------------------------------------------+
| Author       | [facility]  |
|              | and [place]                                |
+--------------+--------------------------------------------+
| Organization | [facility]  |
|              | and [place]                                |
+--------------+--------------------------------------------+
| Address      | Unknown                                    |
+--------------+--------------------------------------------+
| Phone        | Unavailable                                |
+--------------+--------------------------------------------+
 
 
 
 Support
 
 
+----------------+--------------+---------------------+-----------------+
| Name           | Relationship | Address             | Phone           |
+----------------+--------------+---------------------+-----------------+
| Ada/Ed Radhames | ECON         | [address]              | [phone] |
|                |              | [place]  |                 |
|                |              |  [address]              |                 |
+----------------+--------------+---------------------+-----------------+
 
 
 
 
 Care Team Providers
 
 
+------------------------+------+-----------------+
| Care Team Member Name  | Role | Phone           |
+------------------------+------+-----------------+
| Marzena Moser DO | PCP  | [phone] |
+------------------------+------+-----------------+
 
 
 
 Reason for Visit
 
 
+-------------------+----------+
| Reason            | Comments |
+-------------------+----------+
| Medication Orders |          |
+-------------------+----------+
 
 
 
 Encounter Details
 
 
+--------+-----------+---------------------+----------------------+-------------------+
| Date   | Type      | Department          | Care Team            | Description       |
+--------+-----------+---------------------+----------------------+-------------------+
| 10/19/ | Telephone |   [facility]         |   Marzena Moser  | Medication Orders |
| 2018   |           | NEPHROLOGY  [address]   | M, DO  [address]      |                   |
|        |           | [address]   | [address]      |                   |
|        |           | [place], [place]     | [place], [place]      |                   |
|        |           | [address]          | [address]  [phone]  |                   |
|        |           | [phone]        |  [phone] (Fax)  |                   |
+--------+-----------+---------------------+----------------------+-------------------+
 
 
 
 Social History
 
 
+--------------+-------+-----------+--------+------+
| Tobacco Use  | Types | Packs/Day | Years  | Date |
|              |       |           | Used   |      |
+--------------+-------+-----------+--------+------+
| Never Smoker |       |           |        |      |
+--------------+-------+-----------+--------+------+
 
 
 
+---------------------+---+---+---+
| Smokeless Tobacco:  |   |   |   |
| Never Used          |   |   |   |
+---------------------+---+---+---+
 
 
 
+---------------------------------------------------------------+
| Comments: some second hand smoke exposure, but fairly minimal |
 
+---------------------------------------------------------------+
 
 
 
+-------------+-------------+---------+----------+
| Alcohol Use | Drinks/Week | oz/Week | Comments |
+-------------+-------------+---------+----------+
| No          |             |         |          |
+-------------+-------------+---------+----------+
 
 
 
+------------------+---------------+
| Sex Assigned at  | Date Recorded |
| Birth            |               |
+------------------+---------------+
| Not on file      |               |
+------------------+---------------+
 
 
 
+----------------+-------------+-------------+
| Job Start Date | Occupation  | Industry    |
+----------------+-------------+-------------+
| Not on file    | Not on file | Not on file |
+----------------+-------------+-------------+
 
 
 
+----------------+--------------+------------+
| Travel History | Travel Start | Travel End |
+----------------+--------------+------------+
 
 
 
+-------------------------------------+
| No recent travel history available. |
+-------------------------------------+
 documented as of this encounter
 
 Plan of Treatment
 
 
+--------+-----------+------------+----------------------+-------------------+
| Date   | Type      | Specialty  | Care Team            | Description       |
+--------+-----------+------------+----------------------+-------------------+
| / | Office    | Cardiology |   Tonia Wilson,    |                   |
|    | Visit     |            | ARNJESSICA  [address]   |                   |
|        |           |            | [place], [place]  |                   |
|        |           |            | [address]  [phone]  |                   |
|        |           |            |  [phone] (Fax)  |                   |
+--------+-----------+------------+----------------------+-------------------+
| / | Hospital  | Radiology  |   Popeye Townsend, |                   |
|    | Encounter |            |  MD  [address] |                   |
|        |           |            |  [place],   |                   |
|        |           |            | [place] [address]             |                   |
|        |           |            | [phone]         |                   |
|        |           |            | [phone] (Fax)   |                   |
+--------+-----------+------------+----------------------+-------------------+
| / | Surgery   | Radiology  |   Popeye Townsend, | CV EP PPM SYSTEM  |
 
|    |           |            |  MD  [address] | IMPLANT           |
|        |           |            |  St.  [place],   |                   |
|        |           |            | [place] [address]             |                   |
|        |           |            | [phone]         |                   |
|        |           |            | [phone] (Fax)   |                   |
+--------+-----------+------------+----------------------+-------------------+
| 02/10/ | Clinical  | Cardiology |                      |                   |
|    | Support   |            |                      |                   |
+--------+-----------+------------+----------------------+-------------------+
| / | Office    | Cardiology |   Tonia Wilson,    |                   |
|    | Visit     |            | BELKIS  [address]   |                   |
|        |           |            | [place]  |                   |
|        |           |            | [address] [phone]  |                   |
|        |           |            |  [phone] (Fax)  |                   |
+--------+-----------+------------+----------------------+-------------------+
| / | Off-Site  | Nephrology |   Marzena Moser  |                   |
|    | Visit     |            | DO ETIENNE  [address] [place] [address]      |                   |
|        |           |            | [address]      |                   |
|        |           |            | [place]      |                   |
|        |           |            | [address] [phone]  |                   |
|        |           |            |  [phone] (Fax)  |                   |
+--------+-----------+------------+----------------------+-------------------+
 documented as of this encounter
 
 Visit Diagnoses
 Not on filedocumented in this encounter

## 2020-01-08 NOTE — XMS
Encounter Summary
  Created on: 2020
 
 Viviana Ricci
 External Reference #: [id number]
 : 47
 Sex: Female
 
 Demographics
 
 
+-----------------------+----------------------+
| Address               | [address]      |
|                       | [place]  [address] |
+-----------------------+----------------------+
| Home Phone            | [phone]      |
+-----------------------+----------------------+
| Preferred Language    | Unknown              |
+-----------------------+----------------------+
| Marital Status        | Single               |
+-----------------------+----------------------+
| Mormonism Affiliation | 1009                 |
+-----------------------+----------------------+
| Race                  | Unknown              |
+-----------------------+----------------------+
| Ethnic Group          | Unknown              |
+-----------------------+----------------------+
 
 
 Author
 
 
+--------------+--------------------------------------------+
| Author       | [facility]  |
|              | and [place]                                |
+--------------+--------------------------------------------+
| Organization | [facility]  |
|              | and [place]                                |
+--------------+--------------------------------------------+
| Address      | Unknown                                    |
+--------------+--------------------------------------------+
| Phone        | Unavailable                                |
+--------------+--------------------------------------------+
 
 
 
 Support
 
 
+----------------+--------------+---------------------+-----------------+
| Name           | Relationship | Address             | Phone           |
+----------------+--------------+---------------------+-----------------+
| Ada/Ed Radhames | ECON         | [address]              | [phone] |
|                |              | [place]  |                 |
|                |              |  [address]              |                 |
+----------------+--------------+---------------------+-----------------+
 
 
 
 
 Care Team Providers
 
 
+-----------------------+------+-------------+
| Care Team Member Name | Role | Phone       |
+-----------------------+------+-------------+
 PCP  | Unavailable |
+-----------------------+------+-------------+
 
 
 
 Encounter Details
 
 
+--------+-------------+---------------------+----------------------+-------------+
| Date   | Type        | Department          | Care Team            | Description |
+--------+-------------+---------------------+----------------------+-------------+
| / | Orders Only |   [facility]  [place]         |   Marzena Moser  |             |
|    |             | NEPHROLOGY  [address]   | M, DO  [address]      |             |
|        |             | [address]   | [address]      |             |
|        |             | [place]     | [place]      |             |
|        |             | [address]          | [address]  [phone]  |             |
|        |             | [phone]        |  [phone] (Fax)  |             |
+--------+-------------+---------------------+----------------------+-------------+
 
 
 
 Social History
 
 
+--------------+-------+-----------+--------+------+
| Tobacco Use  | Types | Packs/Day | Years  | Date |
|              |       |           | Used   |      |
+--------------+-------+-----------+--------+------+
| Never Smoker |       |           |        |      |
+--------------+-------+-----------+--------+------+
 
 
 
+---------------------+---+---+---+
| Smokeless Tobacco:  |   |   |   |
| Never Used          |   |   |   |
+---------------------+---+---+---+
 
 
 
+---------------------------------------------------------------+
| Comments: some second hand smoke exposure, but fairly minimal |
+---------------------------------------------------------------+
 
 
 
+-------------+-------------+---------+----------+
| Alcohol Use | Drinks/Week | oz/Week | Comments |
+-------------+-------------+---------+----------+
| No          |             |         |          |
+-------------+-------------+---------+----------+
 
 
 
 
+------------------+---------------+
| Sex Assigned at  | Date Recorded |
| Birth            |               |
+------------------+---------------+
| Not on file      |               |
+------------------+---------------+
 
 
 
+----------------+-------------+-------------+
| Job Start Date | Occupation  | Industry    |
+----------------+-------------+-------------+
| Not on file    | Not on file | Not on file |
+----------------+-------------+-------------+
 
 
 
+----------------+--------------+------------+
| Travel History | Travel Start | Travel End |
+----------------+--------------+------------+
 
 
 
+-------------------------------------+
| No recent travel history available. |
+-------------------------------------+
 documented as of this encounter
 
 Plan of Treatment
 
 
+--------+-----------+------------+----------------------+-------------------+
| Date   | Type      | Specialty  | Care Team            | Description       |
+--------+-----------+------------+----------------------+-------------------+
| / | Office    | Cardiology |   Tonia Wilson,    |                   |
|    | Visit     |            | [address]   |                   |
|        |           |            | [place]  |                   |
|        |           |            | [id number] [phone]  |                   |
|        |           |            |  [phone] (Fax)  |                   |
+--------+-----------+------------+----------------------+-------------------+
| / | Hospital  | Radiology  |   Popeye Townsend, |                   |
|    | Encounter |            |  MD [address] |                   |
|        |           |            |  St.  [place],   |                   |
|        |           |            | [place] [address]             |                   |
|        |           |            | [phone]         |                   |
|        |           |            | [phone] (Fax)   |                   |
+--------+-----------+------------+----------------------+-------------------+
| / | Surgery   | Radiology  |   Popeye Townsend, | CV EP PPM SYSTEM  |
|    |           |            |  MD  [address] | IMPLANT           |
|        |           |            |  St.  [place],   |                   |
|        |           |            | [place] [address]             |                   |
|        |           |            | [phone]         |                   |
|        |           |            | [phone] (Fax)   |                   |
+--------+-----------+------------+----------------------+-------------------+
| 02/10/ | Clinical  | Cardiology |                      |                   |
|    | Support   |            |                      |                   |
+--------+-----------+------------+----------------------+-------------------+
| / | Office    | Cardiology |   Tonia Wilson,    |                   |
|    | Visit     |            | [address]   |                   |
 
|        |           |            | St  [place], [place]  |                   |
|        |           |            | [address] [phone]  |                   |
|        |           |            |  [phone] (Fax)  |                   |
+--------+-----------+------------+----------------------+-------------------+
| / | Off-Site  | Nephrology |   Marzena oMser  |                   |
|    | Visit     |            | DO [address]      |                   |
|        |           |            | [address]      |                   |
|        |           |            | [place]      |                   |
|        |           |            | [address] [phone]  |                   |
|        |           |            |  [phone] (Fax)  |                   |
+--------+-----------+------------+----------------------+-------------------+
 documented as of this encounter
 
 Visit Diagnoses
 Not on filedocumented in this encounter

## 2020-01-08 NOTE — XMS
Encounter Summary
  Created on: 2020
 
 Viviana Ricci
 External Reference #: [id number]
 : 47
 Sex: Female
 
 Demographics
 
 
+-----------------------+----------------------+
| Address               | [address]      |
|                       | [place]  [address] |
+-----------------------+----------------------+
| Home Phone            | [phone]      |
+-----------------------+----------------------+
| Preferred Language    | Unknown              |
+-----------------------+----------------------+
| Marital Status        | Single               |
+-----------------------+----------------------+
| Sabianism Affiliation | [id number]                 |
+-----------------------+----------------------+
| Race                  | Unknown              |
+-----------------------+----------------------+
| Ethnic Group          | Unknown              |
+-----------------------+----------------------+
 
 
 Author
 
 
+--------------+--------------------------------------------+
| Author       | [facility]  |
|              | and [facility]                                |
+--------------+--------------------------------------------+
| Organization | [facility]  |
|              | and [facility]                                |
+--------------+--------------------------------------------+
| Address      | Unknown                                    |
+--------------+--------------------------------------------+
| Phone        | Unavailable                                |
+--------------+--------------------------------------------+
 
 
 
 Support
 
 
+----------------+--------------+---------------------+-----------------+
| Name           | Relationship | Address             | Phone           |
+----------------+--------------+---------------------+-----------------+
| Ada/Ed Radhames | ECON         | [address]              | [phone] |
|                |              | [place]  |                 |
|                |              |  [address]              |                 |
+----------------+--------------+---------------------+-----------------+
 
 
 
 
 Care Team Providers
 
 
+-----------------------+------+-------------+
| Care Team Member Name | Role | Phone       |
+-----------------------+------+-------------+
 PCP  | Unavailable |
+-----------------------+------+-------------+
 
 
 
 Encounter Details
 
 
+--------+-----------+----------------------+----------------------+-------------+
| Date   | Type      | Department           | Care Team            | Description |
+--------+-----------+----------------------+----------------------+-------------+
| / | [facility]  |   [facility] |   Marzena Moser  |             |
|    | Encounter |  MED CTR XRAY  [address] | M, DO  [address]      |             |
|        |           |  [place]       | [place]      |             |
|        |           | [place] [address] | [place]      |             |
|        |           |   [phone]       | [address] [phone]  |             |
|        |           |                      |  [phone] (Fax)  |             |
+--------+-----------+----------------------+----------------------+-------------+
 
 
 
 Social History
 
 
+----------------+-------+-----------+--------+------+
| Tobacco Use    | Types | Packs/Day | Years  | Date |
|                |       |           | Used   |      |
+----------------+-------+-----------+--------+------+
| Never Assessed |       |           |        |      |
+----------------+-------+-----------+--------+------+
 
 
 
+------------------+---------------+
| Sex Assigned at  | Date Recorded |
| Birth            |               |
+------------------+---------------+
| Not on file      |               |
+------------------+---------------+
 
 
 
+----------------+-------------+-------------+
| Job Start Date | Occupation  | Industry    |
+----------------+-------------+-------------+
| Not on file    | Not on file | Not on file |
+----------------+-------------+-------------+
 
 
 
+----------------+--------------+------------+
| Travel History | Travel Start | Travel End |
+----------------+--------------+------------+
 
 
 
 
+-------------------------------------+
| No recent travel history available. |
+-------------------------------------+
 documented as of this encounter
 
 Plan of Treatment
 
 
+--------+-----------+------------+----------------------+-------------------+
| Date   | Type      | Specialty  | Care Team            | Description       |
+--------+-----------+------------+----------------------+-------------------+
| / | Office    | Cardiology |   Tonia Wilson,    |                   |
|    | Visit     |            | ARNJESSICA  [address]   |                   |
|        |           |            | St  [place], [place]  |                   |
|        |           |            | [address]  [phone]  |                   |
|        |           |            |  [phone] (Fax)  |                   |
+--------+-----------+------------+----------------------+-------------------+
| / | Hospital  | Radiology  |   Popeye Townsend, |                   |
|    | Encounter |            |  MD  [address] |                   |
|        |           |            |  [place]. [place],   |                   |
|        |           |            | [place] [address]             |                   |
|        |           |            | [phone]         |                   |
|        |           |            | [phone] (Fax)   |                   |
+--------+-----------+------------+----------------------+-------------------+
| / | Surgery   | Radiology  |   Popeye Townsend, | CV EP PPM SYSTEM  |
|    |           |            |  MD  [address] | IMPLANT           |
|        |           |            |  [place],   |                   |
|        |           |            | [place] [address]             |                   |
|        |           |            | [phone]         |                   |
|        |           |            | [phone] (Fax)   |                   |
+--------+-----------+------------+----------------------+-------------------+
| 02/10/ | Clinical  | Cardiology |                      |                   |
|    | Support   |            |                      |                   |
+--------+-----------+------------+----------------------+-------------------+
| / | Office    | Cardiology |   Tonia Wilson,    |                   |
|    | Visit     |            | BELKIS  [address]   |                   |
|        |           |            | [place]  |                   |
|        |           |            | [address]  [phone]  |                   |
|        |           |            |  [phone] (Fax)  |                   |
+--------+-----------+------------+----------------------+-------------------+
| / | Off-Site  | Nephrology |   Marzena Moser  |                   |
|    | Visit     |            | DO ETIENNE  [address] [place] [address]      |                   |
|        |           |            | [address]      |                   |
|        |           |            | [place]      |                   |
|        |           |            | [address] [phone]  |                   |
|        |           |            |  [phone] (Fax)  |                   |
+--------+-----------+------------+----------------------+-------------------+
 documented as of this encounter
 
 Visit Diagnoses
 Not on filedocumented in this encounter

## 2020-01-08 NOTE — XMS
Encounter Summary
  Created on: 2020
 
 Viviana Ricci
 External Reference #: [id number]
 : 47
 Sex: Female
 
 Demographics
 
 
+-----------------------+----------------------+
| Address               | [address]      |
|                       | [place]  [address] |
+-----------------------+----------------------+
| Home Phone            | [phone]      |
+-----------------------+----------------------+
| Preferred Language    | Unknown              |
+-----------------------+----------------------+
| Marital Status        | Single               |
+-----------------------+----------------------+
| Denominational Affiliation | [id number]                 |
+-----------------------+----------------------+
| Race                  | Unknown              |
+-----------------------+----------------------+
| Ethnic Group          | Unknown              |
+-----------------------+----------------------+
 
 
 Author
 
 
+--------------+--------------------------------------------+
| Author       | [facility]  |
|              | and [place]                                |
+--------------+--------------------------------------------+
| Organization | [facility]  |
|              | and [place]                                |
+--------------+--------------------------------------------+
| Address      | Unknown                                    |
+--------------+--------------------------------------------+
| Phone        | Unavailable                                |
+--------------+--------------------------------------------+
 
 
 
 Support
 
 
+----------------+--------------+---------------------+-----------------+
| Name           | Relationship | Address             | Phone           |
+----------------+--------------+---------------------+-----------------+
| Ada/Ed Radhames | ECON         | [address]              | [phone] |
|                |              | [place]  |                 |
|                |              |  [id number]              |                 |
+----------------+--------------+---------------------+-----------------+
 
 
 
 
 Care Team Providers
 
 
+-----------------------+------+-------------+
| Care Team Member Name | Role | Phone       |
+-----------------------+------+-------------+
 PCP  | Unavailable |
+-----------------------+------+-------------+
 
 
 
 Reason for Visit
 Evaluate & Treat (Routine)
 
+--------+--------+------------+--------------+--------------+---------------+
| Status | Reason | Specialty  | Diagnoses /  | Referred By  | Referred To   |
|        |        |            | Procedures   | Contact      | Contact       |
+--------+--------+------------+--------------+--------------+---------------+
| Closed |        | Nephrology |   Diagnoses  |   Referral,  |   Stroemel,   |
|        |        |            |              | Self         | Marzena CLIFTON DO |
|        |        |            | Complication |              |   [address]    |
|        |        |            | s of         |              | [address]   |
|        |        |            | transplanted |              | [address]  [place]    |
|        |        |            |  kidney      |              | [place]     |
|        |        |            | Chronic      |              | [address]  Phone: |
|        |        |            | kidney       |              |  [phone] |
|        |        |            | disease,     |              |   Fax:        |
|        |        |            | stage I      |              | [phone]  |
|        |        |            | Unspecified  |              |               |
|        |        |            | essential    |              |               |
|        |        |            | hypertension |              |               |
|        |        |            |              |              |               |
|        |        |            | Unspecified  |              |               |
|        |        |            | hypertensive |              |               |
|        |        |            |  kidney      |              |               |
|        |        |            | disease with |              |               |
|        |        |            |  chronic     |              |               |
|        |        |            | kidney       |              |               |
|        |        |            | disease      |              |               |
|        |        |            | stage I      |              |               |
|        |        |            | through      |              |               |
|        |        |            | stage IV, or |              |               |
|        |        |            |              |              |               |
|        |        |            | unspecified( |              |               |
|        |        |            | 403.90)      |              |               |
|        |        |            | Procedures   |              |               |
|        |        |            | [facility]    |              |               |
|        |        |            | OUTPATIENT   |              |               |
|        |        |            | VISIT 25     |              |               |
|        |        |            | MINUTES      |              |               |
+--------+--------+------------+--------------+--------------+---------------+
 
 
 
 
 Encounter Details
 
 
+--------+-----------+---------------------+----------------------+----------------------+
 
| Date   | Type      | Department          | Care Team            | Description          |
+--------+-----------+---------------------+----------------------+----------------------+
| / | Off-Site  |   [facility]         |   Marzena Moser  | Unspecified          |
|    | Visit     | NEPHROLOGY  [address]   | M, DO  [address]      | hypertensive kidney  |
|        |           | [address]   | [address]      | disease with chronic |
|        |           | [place], [place]     | [place]      |  kidney disease      |
|        |           | [address]          | [address]  [phone]  | stage I through      |
|        |           | [phone]        |  [phone] (Fax)  | stage IV, or         |
|        |           |                     |                      | unspecified (Primary |
|        |           |                     |                      |  Dx); FSGS (focal    |
|        |           |                     |                      | segmental            |
|        |           |                     |                      | glomerulosclerosis); |
|        |           |                     |                      |  Hypothyroidism;     |
|        |           |                     |                      | Hyperlipidemia       |
+--------+-----------+---------------------+----------------------+----------------------+
 
 
 
 Social History
 
 
+--------------+-------+-----------+--------+------+
| Tobacco Use  | Types | Packs/Day | Years  | Date |
|              |       |           | Used   |      |
+--------------+-------+-----------+--------+------+
| Never Smoker |       |           |        |      |
+--------------+-------+-----------+--------+------+
 
 
 
+---------------------+---+---+---+
| Smokeless Tobacco:  |   |   |   |
| Never Used          |   |   |   |
+---------------------+---+---+---+
 
 
 
+---------------------------------------------------------------+
| Comments: some second hand smoke exposure, but fairly minimal |
+---------------------------------------------------------------+
 
 
 
+-------------+-------------+---------+----------+
| Alcohol Use | Drinks/Week | oz/Week | Comments |
+-------------+-------------+---------+----------+
| No          |             |         |          |
+-------------+-------------+---------+----------+
 
 
 
+------------------+---------------+
| Sex Assigned at  | Date Recorded |
| Birth            |               |
+------------------+---------------+
| Not on file      |               |
+------------------+---------------+
 
 
 
 
+----------------+-------------+-------------+
| Job Start Date | Occupation  | Industry    |
+----------------+-------------+-------------+
| Not on file    | Not on file | Not on file |
+----------------+-------------+-------------+
 
 
 
+----------------+--------------+------------+
| Travel History | Travel Start | Travel End |
+----------------+--------------+------------+
 
 
 
+-------------------------------------+
| No recent travel history available. |
+-------------------------------------+
 documented as of this encounter
 
 Last Filed Vital Signs
 
 
+-------------------+---------------------+----------------------+----------+
| Vital Sign        | Reading             | Time Taken           | Comments |
+-------------------+---------------------+----------------------+----------+
| Blood Pressure    | 130/86              | 2014  1:14 PM  |          |
|                   |                     | PDT                  |          |
+-------------------+---------------------+----------------------+----------+
| Pulse             | -                   | -                    |          |
+-------------------+---------------------+----------------------+----------+
| Temperature       | 36.4   C (97.5   F) | 2014  1:14 PM  |          |
|                   |                     | PDT                  |          |
+-------------------+---------------------+----------------------+----------+
| Respiratory Rate  | -                   | -                    |          |
+-------------------+---------------------+----------------------+----------+
| Oxygen Saturation | -                   | -                    |          |
+-------------------+---------------------+----------------------+----------+
| Inhaled Oxygen    | -                   | -                    |          |
| Concentration     |                     |                      |          |
+-------------------+---------------------+----------------------+----------+
| Weight            | -                   | -                    |          |
+-------------------+---------------------+----------------------+----------+
| Height            | -                   | -                    |          |
+-------------------+---------------------+----------------------+----------+
| Body Mass Index   | -                   | -                    |          |
+-------------------+---------------------+----------------------+----------+
 documented in this encounter
 
 Progress Notes
 Marzena Moser DO - 2014  4:22 PM PDTFormatting of this note might be different
 from the original.
 Subjective:  NEPHROLOGY 
  
 Patient ID: Viviana Ricci is a 68 y.o. female.
 
 HPI Comments: 
 Followup for this 68 year old white female s/p renal allograft, 05, with previous allo
graft dysfunction secondary to FSGS, also with Type II DM, hypertension, hypothyroidism, hyp
erlipidemia, SHPTH,  previous low back pain, obesity/JACK, chronic pulmonary  hypertension, h
istorically related to centripetal obesity, and  CAD, s/p CABG x3 vessels,.  She refuses
 
 to weight today, but is otherwise , very pleasant and an accurate historian.  She denies co
ugh,  fever, chills, or dyspnea.
 
 MEDS:
 
 Prograf 1.5 mg, BID
 Mycophenolate 250 mg, TID.
 Prednisone 5 mg, daily.
 Outpatient Prescriptions Marked as Taking for the 14 encounter (Off-Site Visit) with ETIENNE Moser, DO 
 Medication Sig Dispense Refill 
   allopurinol (ZYLOPRIM) 100 mg tablet Take 1 tablet by mouth Daily.  30 tablet  11 
   aspirin 81 MG EC tablet Take 81 mg by mouth Daily.     
   cholecalciferol (VITAMIN D-3) 2000 UNITS TABS Take 1,000 Units by mouth Three times a w
[place].     
   cinacalcet (SENSIPAR) 30 mg tablet Take 1 tablet by mouth Daily.  30 tablet  12 
   fludrocortisone (FLORINEF) 0.1 mg tablet Take 1 tablet by mouth Every other day.  45 ta
blet  2 
   fluticasone (FLOVENT HFA) 220 mcg/puff inhaler Inhale 1 puff into the lungs 2 times shante
ly. Rinse mouth after use.  1 Inhaler  11 
   furosemide (LASIX) 40 mg tablet Take 40 mg by mouth Daily as needed.     
   [DISCONTINUED] furosemide (LASIX) 40 mg tablet Take 1 tablet by mouth Daily.  30 tablet
  5 
   glucose blood VI test strips (ONE TOUCH ULTRA TEST) strip Check blood sugar before each
 meal and as directed  100 each  12 
   insulin glargine (LANTUS) 100 units/mL injection Inject 20 Units under the skin every m
orning.  10 mL  11 
   insulin lispro (HUMALOG) 100 units/mL injection Inject subcutaneously before meals acco
rding to sliding scale  10 vial  11 
   Insulin Syringe-Needle U-100 (BD INSULIN SYRINGE ULTRAFINE) 31G X 5/16" 0.5 ML MISC Use
 before meals and as directed.  100 each  11 
   levothyroxine (LEVOTHROID) 50 mcg tablet Take 1 tablet by mouth Daily.  30 tablet  11 
   lisinopril (PRINIVIL,ZESTRIL) 30 MG tablet Take 1 tablet by mouth Daily.  90 tablet  3 
   loperamide (ANTI-DIARRHEAL) 2 mg capsule Take 1 capsule by mouth 4 times daily as neede
d.  60 capsule  5 
   [DISCONTINUED] loperamide (ANTI-DIARRHEAL) 2 mg capsule Take 2 mg by mouth Daily as nee
ded.     
   magnesium oxide (MAG-OX) 400 mg tablet Take 1 tablet by mouth 2 times daily.  62 tablet
  12 
   metoprolol tartrate (LOPRESSOR) 25 mg tablet Take 1 tablet by mouth 2 times daily.  60 
tablet  11 
   omeprazole (PRILOSEC) 20 mg capsule Take one capsule by mouth once daily on an empty st
omach  90 capsule  3 
   Prenatal Multivit-Min-Fe-FA (PRENATAL VITAMINS) 0.8 MG TABS Take 0.8 mg by mouth Daily.
  30 each  11 
   [DISCONTINUED] Prenatal Vit-Fe Fumarate-FA (PNV PRENATAL PLUS MULTIVITAMIN) 27-1 MG TAB
S      
   Respiratory Therapy Supplies MISC Decrease CPAP to 12-18 cmH2O Diagnosis Code(s)327.23.
 Please send order to [facility].  1 each  0 
   rosuvastatin (CRESTOR) 20 mg tablet Take 1 tablet by mouth nightly.  30 tablet  11 
   valsartan (DIOVAN) 160 mg tablet Take 1 tablet by mouth Daily.  30 tablet  11 
 
 No Known Allergies
 
 Objective:   Blood pressure 130/86, temperature 36.4 C (97.5 F).
 weight = refused.
  
 Physical Exam
 
 HEENT: No thrush.
 
 Heart:  Regular rate and rhythm with grade  1/6 MAYA at LSB, no S3, or rub.
 Lungs:   CTA bilaterally, no rales or wheezes.
 Abdomen:  Soft, obese, the renal allograft in RLQ is nontender, normoactive bowel sounds.
 Extremities: No clubbing, cyanosis,  edema, or foot ulcers.
 
 LAB:   
 
 Lab Results 
 Component Value Date 
   2014 
  K 5.4 2014 
   2014 
  CO2 22 2014 
  BUN 40 2014 
  CREEX 1.44* 2014 
  EGFREX 36* 2014 
   2014 
  CALCIUM 10.4 2014 
  PHOS 3.0 2014 
  .9 2014 
  YAA6BLP 7.3* 2014 
 
 Lab Results 
 Component Value Date 
  CHOLEX 166 2014 
  HDLEX 52.6 2014 
  LDLEX 76 2014 
  TRIGEX 186* 2014 
 
 Lab Results 
 Component Value Date 
  TACROLIMUS 5.2 2014 
 .
 
 Lab Results 
 Component Value Date 
  WBCEX 6.3 2014 
  HGBEX 13.5 2014 
  HCTEX 40.8 2014 
  PLTEX 180 2014 
 
  
 
 Assessment: 
 
 1.  Renal Allograft--Scr is close to baseline.  
 2.  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
 3.  Type 2 DM--good control  by the most recent Hbaic.  
 4.  Hyperlipidemia--stable on crestor.
 5.  Hypertension--good control.
 6.  SHPTH--stable for the circumstances.
 7.  Obesity/JACK/Pulmonary hypertension-- stable. 
 8.  CAD, s/p CABG, --stable on ASA, metoprolol, atorvastatin.
 9.  Type IV RTA--stable.
 10. Chronic Low Back pain--in remission.
 11.  worsening DJD, left knee--about same.
 
 Plan: 
 
  
 
 1.  I discussed with her that her Scr, and tacrolimus levels appear satisfactory.  She appe
ars very consistent with her medical regimen.
 2.  She is very sensitive about her weight, and feels somewhat fatalistic about ever contro
lling her obesity, or modifying her lifestyle.
 3.  No change in meds.
 4.  Will plan to see her back in 6 mo. at the [facility] , [place], [place]. She will alyx
nue to have her standing order done Q 2 months.
 
 CC:    Jose David Dalal M.D., [facility], [facility]
            Edgar Sanders MD, [facility], Orthopedics
 
 Electronically signed by Marzena Moser DO at 2014  4:34 PM PDTdocumented in thi
s encounter
 
 Plan of Treatment
 
 
+--------+-----------+------------+----------------------+-------------------+
| Date   | Type      | Specialty  | Care Team            | Description       |
+--------+-----------+------------+----------------------+-------------------+
| / | Office    | Cardiology |   Tonia Wilson,    |                   |
|    | Visit     |            | ARNP  [address]   |                   |
|        |           |            | [place], [place]  |                   |
|        |           |            | [address]  [phone]  |                   |
|        |           |            |  [phone] (Fax)  |                   |
+--------+-----------+------------+----------------------+-------------------+
| / | Hospital  | Radiology  |   Popeye Townsend, |                   |
|    | Encounter |            |  MD  [address] |                   |
|        |           |            |  [place],   |                   |
|        |           |            | [place] [address]             |                   |
|        |           |            | [phone]         |                   |
|        |           |            | [phone] (Fax)   |                   |
+--------+-----------+------------+----------------------+-------------------+
| / | Surgery   | Radiology  |   Popeye Townsend, | CV EP PPM SYSTEM  |
|    |           |            |  MD  [address] | IMPLANT           |
|        |           |            |  [place],   |                   |
|        |           |            | [place] [address]             |                   |
|        |           |            | [phone]         |                   |
|        |           |            | [phone] (Fax)   |                   |
+--------+-----------+------------+----------------------+-------------------+
| 02/10/ | Clinical  | Cardiology |                      |                   |
|    | Support   |            |                      |                   |
+--------+-----------+------------+----------------------+-------------------+
| / | Office    | Cardiology |   RakeshmaxxalfredoArlena,    |                   |
|    | Visit     |            | ARNP  [address]   |                   |
|        |           |            | [place]  |                   |
|        |           |            | [address]  [phone]  |                   |
|        |           |            |  [phone] (Fax)  |                   |
+--------+-----------+------------+----------------------+-------------------+
| / | Off-Site  | Nephrology |   Marzena Moser  |                   |
|    | Visit     |            | DO ETIENNE  [address] [place] [address]      |                   |
|        |           |            | [address]      |                   |
|        |           |            | [place]      |                   |
|        |           |            | [address]  [phone]  |                   |
|        |           |            |  [phone] (Fax)  |                   |
+--------+-----------+------------+----------------------+-------------------+
 documented as of this encounter
 
 Visit Diagnoses
 
 
 
+-----------------------------------------------------------------------------------------+
| Diagnosis                                                                               |
+-----------------------------------------------------------------------------------------+
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
| stage IV, or unspecified - Primary                                                      |
+-----------------------------------------------------------------------------------------+
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
| membranous glomerulonephritis                                                           |
+-----------------------------------------------------------------------------------------+
|   Hypothyroidism  Unspecified hypothyroidism                                            |
+-----------------------------------------------------------------------------------------+
|   Hyperlipidemia  Other and unspecified hyperlipidemia                                  |
+-----------------------------------------------------------------------------------------+
 documented in this encounter

## 2020-01-08 NOTE — XMS
Encounter Summary
  Created on: 2020
 
 Viviana Ricci
 External Reference #: [id number]
 : 47
 Sex: Female
 
 Demographics
 
 
+-----------------------+----------------------+
| Address               | [address]      |
|                       | [place]  [address] |
+-----------------------+----------------------+
| Home Phone            | [phone]      |
+-----------------------+----------------------+
| Preferred Language    | Unknown              |
+-----------------------+----------------------+
| Marital Status        | Single               |
+-----------------------+----------------------+
| Mormonism Affiliation | [id number]                 |
+-----------------------+----------------------+
| Race                  | Unknown              |
+-----------------------+----------------------+
| Ethnic Group          | Unknown              |
+-----------------------+----------------------+
 
 
 Author
 
 
+--------------+--------------------------------------------+
| Author       | [facility] [place]  |
|              | and [place]                                |
+--------------+--------------------------------------------+
| Organization | [facility] [place]  |
|              | and [place]                                |
+--------------+--------------------------------------------+
| Address      | Unknown                                    |
+--------------+--------------------------------------------+
| Phone        | Unavailable                                |
+--------------+--------------------------------------------+
 
 
 
 Support
 
 
+----------------+--------------+---------------------+-----------------+
| Name           | Relationship | Address             | Phone           |
+----------------+--------------+---------------------+-----------------+
| Ada/Ed Radhames | ECON         | [address]              | [phone] |
|                |              | [place]  |                 |
|                |              |  [address]              |                 |
+----------------+--------------+---------------------+-----------------+
 
 
 
 
 Care Team Providers
 
 
+-----------------------+------+-------------+
| Care Team Member Name | Role | Phone       |
+-----------------------+------+-------------+
 PCP  | Unavailable |
+-----------------------+------+-------------+
 
 
 
 Encounter Details
 
 
+--------+-------------+---------------------+----------------------+----------------------+
| Date   | Type        | Department          | Care Team            | Description          |
+--------+-------------+---------------------+----------------------+----------------------+
| / | Orders Only |   [facility]         |   Marzena Moser  | Kidney replaced by   |
| 2016   |             | NEPHROLOGY  [address]   | M,   [address]      | transplant (Primary  |
|        |             | [address]   | [address]      | Dx); Diabetes        |
|        |             | [place], [place]     | [place], [place]      | mellitus type II,    |
|        |             | [address]          | [address]  [phone]  | uncontrolled (HCC);  |
|        |             | [phone]        |  [phone] (Fax)  | Other                |
|        |             |                     |                      | hyperlipidemia;      |
|        |             |                     |                      | Hypothyroidism due   |
|        |             |                     |                      | to acquired atrophy  |
|        |             |                     |                      | of thyroid           |
+--------+-------------+---------------------+----------------------+----------------------+
 
 
 
 Social History
 
 
+--------------+-------+-----------+--------+------+
| Tobacco Use  | Types | Packs/Day | Years  | Date |
|              |       |           | Used   |      |
+--------------+-------+-----------+--------+------+
| Never Smoker |       |           |        |      |
+--------------+-------+-----------+--------+------+
 
 
 
+---------------------+---+---+---+
| Smokeless Tobacco:  |   |   |   |
| Never Used          |   |   |   |
+---------------------+---+---+---+
 
 
 
+---------------------------------------------------------------+
| Comments: some second hand smoke exposure, but fairly minimal |
+---------------------------------------------------------------+
 
 
 
+-------------+-------------+---------+----------+
| Alcohol Use | Drinks/Week | oz/Week | Comments |
+-------------+-------------+---------+----------+
 
| No          |             |         |          |
+-------------+-------------+---------+----------+
 
 
 
+------------------+---------------+
| Sex Assigned at  | Date Recorded |
| Birth            |               |
+------------------+---------------+
| Not on file      |               |
+------------------+---------------+
 
 
 
+----------------+-------------+-------------+
| Job Start Date | Occupation  | Industry    |
+----------------+-------------+-------------+
| Not on file    | Not on file | Not on file |
+----------------+-------------+-------------+
 
 
 
+----------------+--------------+------------+
| Travel History | Travel Start | Travel End |
+----------------+--------------+------------+
 
 
 
+-------------------------------------+
| No recent travel history available. |
+-------------------------------------+
 documented as of this encounter
 
 Plan of Treatment
 
 
+--------+-----------+------------+----------------------+-------------------+
| Date   | Type      | Specialty  | Care Team            | Description       |
+--------+-----------+------------+----------------------+-------------------+
| / | Office    | Cardiology |   Tonia Wilson,    |                   |
| 2020   | Visit     |            | [address]   |                   |
|        |           |            | St  [place]  |                   |
|        |           |            | [address]  [phone]  |                   |
|        |           |            |  [phone] (Fax)  |                   |
+--------+-----------+------------+----------------------+-------------------+
| / | Hospital  | Radiology  |   Popeye Townsend, |                   |
|    | Encounter |            |  MD  [address] |                   |
|        |           |            |  [place],   |                   |
|        |           |            | [place] [address]             |                   |
|        |           |            | [phone]         |                   |
|        |           |            | [phone] (Fax)   |                   |
+--------+-----------+------------+----------------------+-------------------+
| / | Surgery   | Radiology  |   Popeye Townsend, | CV EP PPM SYSTEM  |
|    |           |            |  MD  [address] | IMPLANT           |
|        |           |            |  [place]. [place],   |                   |
|        |           |            | [place] [address]             |                   |
|        |           |            | [phone]         |                   |
|        |           |            | [phone] (Fax)   |                   |
+--------+-----------+------------+----------------------+-------------------+
| 02/10/ | Clinical  | Cardiology |                      |                   |
 
|    | Support   |            |                      |                   |
+--------+-----------+------------+----------------------+-------------------+
| / | Office    | Cardiology |   RakeshmaxxTonia fuentes,    |                   |
|    | Visit     |            | [facility]  [address]   |                   |
|        |           |            | St  [place]  |                   |
|        |           |            | [address]  [phone]  |                   |
|        |           |            |  [phone] (Fax)  |                   |
+--------+-----------+------------+----------------------+-------------------+
| / | Off-Site  | Nephrology |   Marzena Moser  |                   |
|    | Visit     |            | DO ETIENNE  [address] [place] [address]      |                   |
|        |           |            | [address]      |                   |
|        |           |            | [place], [place]      |                   |
|        |           |            | [address]  [phone]  |                   |
|        |           |            |  [phone] (Fax)  |                   |
+--------+-----------+------------+----------------------+-------------------+
 
 
 
+---------------------+------+--------+----------------------+----------------------+
| Name                | Type | Priori | Associated Diagnoses | Order Schedule       |
|                     |      | ty     |                      |                      |
+---------------------+------+--------+----------------------+----------------------+
| Phosphorus          | Lab  | Routin |   Kidney replaced by | every 90 days for 4  |
|                     |      | e      |  transplant          | Occurrences starting |
|                     |      |        |                      |  2016 until    |
|                     |      |        |                      | 2017           |
+---------------------+------+--------+----------------------+----------------------+
| Urinalysis, Reflex  | Lab  | Routin |   Kidney replaced by | every 90 days for 4  |
| Microscopic and/or  |      | e      |  transplant          | Occurrences starting |
| Culture             |      |        |                      |  2016 until    |
|                     |      |        |                      | 2017           |
+---------------------+------+--------+----------------------+----------------------+
| CBC with            | Lab  | Routin |   Kidney replaced by | every 90 days for 4  |
| Differential        |      | e      |  transplant          | Occurrences starting |
|                     |      |        |                      |  2016 until    |
|                     |      |        |                      | 2017           |
+---------------------+------+--------+----------------------+----------------------+
| Tacrolimus ()  | Lab  | Routin |   Kidney replaced by | monthly or prn for   |
| Level               |      | e      |  transplant          | 12 Occurrences       |
|                     |      |        |                      | starting 2016  |
|                     |      |        |                      | until 2017     |
+---------------------+------+--------+----------------------+----------------------+
| Magnesium           | Lab  | Routin |   Kidney replaced by | every 90 days for 4  |
|                     |      | e      |  transplant          | Occurrences starting |
|                     |      |        |                      |  2016 until    |
|                     |      |        |                      | 2017           |
+---------------------+------+--------+----------------------+----------------------+
| Hemoglobin A1C      | Lab  | Routin |   Kidney replaced by | every 90 days for 4  |
|                     |      | e      |  transplant          | Occurrences starting |
|                     |      |        | Diabetes mellitus    |  2016 until    |
|                     |      |        | type II,             | 2017           |
|                     |      |        | uncontrolled (HCC)   |                      |
+---------------------+------+--------+----------------------+----------------------+
| Lipid Profile       | Lab  | Routin |   Other              | every 90  days for 4 |
|                     |      | e      | hyperlipidemia       |  Occurrences         |
|                     |      |        |                      | starting 2016  |
|                     |      |        |                      | until 2017     |
+---------------------+------+--------+----------------------+----------------------+
| TSH                 | Lab  | Routin |   Hypothyroidism due | every 6 months for 2 |
|                     |      | e      |  to acquired atrophy |  Occurrences         |
 
|                     |      |        |  of thyroid          | starting 2016  |
|                     |      |        |                      | until 2017     |
+---------------------+------+--------+----------------------+----------------------+
 documented as of this encounter
 
 Visit Diagnoses
 
 
+----------------------------------------------------------------------------------------+
| Diagnosis                                                                              |
+----------------------------------------------------------------------------------------+
|   Kidney replaced by transplant - Primary                                              |
+----------------------------------------------------------------------------------------+
|   Diabetes mellitus type II, uncontrolled (HCC)  Type II or unspecified type diabetes  |
| mellitus without mention of complication, uncontrolled                                 |
+----------------------------------------------------------------------------------------+
|   Other hyperlipidemia                                                                 |
+----------------------------------------------------------------------------------------+
|   Hypothyroidism due to acquired atrophy of thyroid                                    |
+----------------------------------------------------------------------------------------+
 documented in this encounter

## 2020-01-08 NOTE — XMS
Encounter Summary
  Created on: 2020
 
 Viviana Ricci
 External Reference #: [id number]
 : 47
 Sex: Female
 
 Demographics
 
 
+-----------------------+----------------------+
| Address               | [address]      |
|                       | [place]  [address] |
+-----------------------+----------------------+
| Home Phone            | [phone]      |
+-----------------------+----------------------+
| Preferred Language    | Unknown              |
+-----------------------+----------------------+
| Marital Status        | Single               |
+-----------------------+----------------------+
| Baptism Affiliation | 1009                 |
+-----------------------+----------------------+
| Race                  | Unknown              |
+-----------------------+----------------------+
| Ethnic Group          | Unknown              |
+-----------------------+----------------------+
 
 
 Author
 
 
+--------------+--------------------------------------------+
| Author       | [facility] [place]  |
|              | and [place]                                |
+--------------+--------------------------------------------+
| Organization | [facility] [place]  |
|              | and [place]                                |
+--------------+--------------------------------------------+
| Address      | Unknown                                    |
+--------------+--------------------------------------------+
| Phone        | Unavailable                                |
+--------------+--------------------------------------------+
 
 
 
 Support
 
 
+----------------+--------------+---------------------+-----------------+
| Name           | Relationship | Address             | Phone           |
+----------------+--------------+---------------------+-----------------+
| Ada/Ed Radhames | ECON         | [address]              | [phone] |
|                |              | [place]  |                 |
|                |              |  [address]              |                 |
+----------------+--------------+---------------------+-----------------+
 
 
 
 
 Care Team Providers
 
 
+-----------------------+------+-------------+
| Care Team Member Name | Role | Phone       |
+-----------------------+------+-------------+
 PCP  | Unavailable |
+-----------------------+------+-------------+
 
 
 
 Reason for Visit
 
 
+--------------------+------------+
| Reason             | Comments   |
+--------------------+------------+
| Medication Problem | Tacrolimus |
+--------------------+------------+
 
 
 
 Encounter Details
 
 
+--------+--------+---------------------+----------------------+---------------------+
| Date   | Type   | Department          | Care Team            | Description         |
+--------+--------+---------------------+----------------------+---------------------+
| 10/11/ | Refill |   [facility]         |   Shanti Marzena  | Medication Problem  |
|    |        | NEPHROLOGY  [address]   | M, DO  [address]      | (Tacrolimus)        |
|        |        | [address]   | [address]      |                     |
|        |        | [place], [place]     | [place], [place]      |                     |
|        |        | [address]          | [address]  [phone]  |                     |
|        |        | [phone]        |  [phone] (Fax)  |                     |
+--------+--------+---------------------+----------------------+---------------------+
 
 
 
 Social History
 
 
+----------------+-------+-----------+--------+------+
| Tobacco Use    | Types | Packs/Day | Years  | Date |
|                |       |           | Used   |      |
+----------------+-------+-----------+--------+------+
| Never Assessed |       |           |        |      |
+----------------+-------+-----------+--------+------+
 
 
 
+------------------+---------------+
| Sex Assigned at  | Date Recorded |
| Birth            |               |
+------------------+---------------+
| Not on file      |               |
+------------------+---------------+
 
 
 
 
+----------------+-------------+-------------+
| Job Start Date | Occupation  | Industry    |
+----------------+-------------+-------------+
| Not on file    | Not on file | Not on file |
+----------------+-------------+-------------+
 
 
 
+----------------+--------------+------------+
| Travel History | Travel Start | Travel End |
+----------------+--------------+------------+
 
 
 
+-------------------------------------+
| No recent travel history available. |
+-------------------------------------+
 documented as of this encounter
 
 Plan of Treatment
 
 
+--------+-----------+------------+----------------------+-------------------+
| Date   | Type      | Specialty  | Care Team            | Description       |
+--------+-----------+------------+----------------------+-------------------+
| / | Office    | Cardiology |   Tonia Wilson,    |                   |
|    | Visit     |            | ARNP  [address]   |                   |
|        |           |            | [place]  |                   |
|        |           |            | [address]  [phone]  |                   |
|        |           |            |  [phone] (Fax)  |                   |
+--------+-----------+------------+----------------------+-------------------+
| / | Hospital  | Radiology  |   Popeye Townsend, |                   |
|    | Encounter |            |  MD  [address] |                   |
|        |           |            |  [place],   |                   |
|        |           |            | [place] [address]             |                   |
|        |           |            | [phone]         |                   |
|        |           |            | [phone] (Fax)   |                   |
+--------+-----------+------------+----------------------+-------------------+
| / | Surgery   | Radiology  |   Popeye Townsend, | CV EP PPM SYSTEM  |
|    |           |            |  MD  [address] | IMPLANT           |
|        |           |            |  St.  [place],   |                   |
|        |           |            | [place] [address]             |                   |
|        |           |            | [phone]         |                   |
|        |           |            | [phone] (Fax)   |                   |
+--------+-----------+------------+----------------------+-------------------+
| 02/10/ | Clinical  | Cardiology |                      |                   |
|    | Support   |            |                      |                   |
+--------+-----------+------------+----------------------+-------------------+
| / | Office    | Cardiology |   Tonia Wilson,    |                   |
|    | Visit     |            | ARNP  [address]   |                   |
|        |           |            | [place]  |                   |
|        |           |            | [address]  [phone]  |                   |
|        |           |            |  [phone] (Fax)  |                   |
+--------+-----------+------------+----------------------+-------------------+
| / | Off-Site  | Nephrology |   Marzena Moser  |                   |
|    | Visit     |            | DO [address]      |                   |
|        |           |            | [address]      |                   |
|        |           |            | [place]      |                   |
|        |           |            | [address]  [phone]  |                   |
|        |           |            |  [phone] (Fax)  |                   |
 
+--------+-----------+------------+----------------------+-------------------+
 documented as of this encounter
 
 Visit Diagnoses
 Not on filedocumented in this encounter

## 2020-01-08 NOTE — XMS
Encounter Summary
  Created on: 2020
 
 Viviana Ricci
 External Reference #: [id number]
 : 47
 Sex: Female
 
 Demographics
 
 
+-----------------------+----------------------+
| Address               | [address]      |
|                       | [place]  [address] |
+-----------------------+----------------------+
| Home Phone            | [phone]      |
+-----------------------+----------------------+
| Preferred Language    | Unknown              |
+-----------------------+----------------------+
| Marital Status        | Single               |
+-----------------------+----------------------+
| Alevism Affiliation | [id number]                 |
+-----------------------+----------------------+
| Race                  | Unknown              |
+-----------------------+----------------------+
| Ethnic Group          | Unknown              |
+-----------------------+----------------------+
 
 
 Author
 
 
+--------------+--------------------------------------------+
| Author       | [facility]  |
|              | and [facility]                                |
+--------------+--------------------------------------------+
| Organization | [facility]  |
|              | and [facility]                                |
+--------------+--------------------------------------------+
| Address      | Unknown                                    |
+--------------+--------------------------------------------+
| Phone        | Unavailable                                |
+--------------+--------------------------------------------+
 
 
 
 Support
 
 
+----------------+--------------+---------------------+-----------------+
| Name           | Relationship | Address             | Phone           |
+----------------+--------------+---------------------+-----------------+
| Ada/Ed Radhames | ECON         | [address]              | [phone] |
|                |              | [place]  |                 |
|                |              |  [id number]              |                 |
+----------------+--------------+---------------------+-----------------+
 
 
 
 
 Care Team Providers
 
 
+------------------------+------+-----------------+
| Care Team Member Name  | Role | Phone           |
+------------------------+------+-----------------+
| Marzena Moser PCP  | [phone] |
+------------------------+------+-----------------+
 
 
 
 Reason for Visit
 Diagnostic/Screening (Routine)
 
+--------+--------+-----------+--------------+--------------+---------------+
| Status | Reason | Specialty | Diagnoses /  | Referred By  | Referred To   |
|        |        |           | Procedures   | Contact      | Contact       |
+--------+--------+-----------+--------------+--------------+---------------+
| Closed |        | Radiology |   Diagnoses  |   [facility]  |   [facility] |
|        |        |           |  Coronary    | Tonia, ARNP   |  Medicine     |
|        |        |           | artery       | [address] | [address]  |
|        |        |           | disease      |  St  [place]   |  [place], |
|        |        |           | involving    | [place]    |  [place]           |
|        |        |           | native       | [address]        | [id number]    |
|        |        |           | coronary     | Phone:       | Phone:        |
|        |        |           | artery of    | [phone] | [phone]  |
|        |        |           | native heart |   Fax:       |  Fax:         |
|        |        |           |  without     | [phone] | [phone]  |
|        |        |           | angina       |              |               |
|        |        |           | pectoris     |              |               |
|        |        |           | Hypertension |              |               |
|        |        |           | , essential  |              |               |
|        |        |           |  Mixed       |              |               |
|        |        |           | hyperlipidem |              |               |
|        |        |           | ia           |              |               |
|        |        |           | Procedures   |              |               |
|        |        |           | NM Nuclear   |              |               |
|        |        |           | Stress Test  |              |               |
|        |        |           | (Vasodilator |              |               |
|        |        |           | )            |              |               |
+--------+--------+-----------+--------------+--------------+---------------+
 
 
 
 
 Encounter Details
 
 
+--------+-----------+----------------------+----------------------+-------------+
| Date   | Type      | Department           | Care Team            | Description |
+--------+-----------+----------------------+----------------------+-------------+
| 10/30/ | Hospital  |   [facility] |   Tonia Wilson,    |             |
| 2019   | Encounter |  MED CTR NUCLEAR     | ARNP  [address]   |             |
|        |           | MEDICINE  [address]      | [address]  [place], [place]  |             |
|        |           | [address]  [place], | [address]  [phone]  |             |
|        |           |  [place] [address]       |  [phone] (Fax)  |             |
|        |           | [phone]         |                      |             |
+--------+-----------+----------------------+----------------------+-------------+
 
 
 
 
 Social History
 
 
+--------------+-------+-----------+--------+------+
| Tobacco Use  | Types | Packs/Day | Years  | Date |
|              |       |           | Used   |      |
+--------------+-------+-----------+--------+------+
| Never Smoker |       |           |        |      |
+--------------+-------+-----------+--------+------+
 
 
 
+---------------------+---+---+---+
| Smokeless Tobacco:  |   |   |   |
| Never Used          |   |   |   |
+---------------------+---+---+---+
 
 
 
+---------------------------------------------------------------+
| Comments: some second hand smoke exposure, but fairly minimal |
+---------------------------------------------------------------+
 
 
 
+-------------+-------------+---------+----------+
| Alcohol Use | Drinks/Week | oz/Week | Comments |
+-------------+-------------+---------+----------+
| No          |             |         |          |
+-------------+-------------+---------+----------+
 
 
 
+------------------+---------------+
| Sex Assigned at  | Date Recorded |
| Birth            |               |
+------------------+---------------+
| Not on file      |               |
+------------------+---------------+
 
 
 
+----------------+-------------+-------------+
| Job Start Date | Occupation  | Industry    |
+----------------+-------------+-------------+
| Not on file    | Not on file | Not on file |
+----------------+-------------+-------------+
 
 
 
+----------------+--------------+------------+
| Travel History | Travel Start | Travel End |
+----------------+--------------+------------+
 
 
 
+-------------------------------------+
| No recent travel history available. |
 
+-------------------------------------+
 documented as of this encounter
 
 Medications at Time of Discharge
 
 
+----------------------+----------------------+-----------+---------+----------+-----------+
| Medication           | Sig                  | Dispensed | Refills | Start    | End Date  |
|                      |                      |           |         | Date     |           |
+----------------------+----------------------+-----------+---------+----------+-----------+
|   allopurinol        | take 1 tablet by     |   30      | 11      | 20 |           |
| (ZYLOPRIM) 100 mg    | mouth once daily     | tablet    |         | 18       |           |
| tablet               |                      |           |         |          |           |
+----------------------+----------------------+-----------+---------+----------+-----------+
|   aspirin 81 mg EC   | Take 81 mg by mouth  |           | 0       |          |           |
| tablet               | Daily.               |           |         |          |           |
+----------------------+----------------------+-----------+---------+----------+-----------+
|   B-D INS SYRINGE    | USE BEFORE MEALS AS  |   1 each  | 11      | 20 |           |
| 0.5CC/31GX5/16 31G X | DIRECTED             |           |         | 18       |           |
|  " 0.5 ML MISC   |                      |           |         |          |           |
+----------------------+----------------------+-----------+---------+----------+-----------+
|   cholecalciferol    | Take 2,000 Units by  |           | 0       |          |           |
| (VITAMIN D-3) 2000   | mouth Every other    |           |         |          |           |
| UNITS                | day.                 |           |         |          |           |
| TABSIndications:     |                      |           |         |          |           |
| Unspecified          |                      |           |         |          |           |
| hypertensive kidney  |                      |           |         |          |           |
| disease with chronic |                      |           |         |          |           |
|  kidney disease      |                      |           |         |          |           |
| stage I through      |                      |           |         |          |           |
| stage IV, or         |                      |           |         |          |           |
| unspecified(403.90), |                      |           |         |          |           |
|  FSGS (focal         |                      |           |         |          |           |
| segmental            |                      |           |         |          |           |
| glomerulosclerosis), |                      |           |         |          |           |
|  Hyperlipidemia,     |                      |           |         |          |           |
| Complications of     |                      |           |         |          |           |
| transplanted kidney  |                      |           |         |          |           |
+----------------------+----------------------+-----------+---------+----------+-----------+
|   cinacalcet         | take 1 tablet by     |   90      | 3       | 20 |           |
| (SENSIPAR) 30 mg     | mouth once daily     | tablet    |         | 19       |           |
| tablet               |                      |           |         |          |           |
+----------------------+----------------------+-----------+---------+----------+-----------+
|   cyclobenzaprine    | Take 1 tablet by     |   45      | 0       | 20 |           |
| (FLEXERIL) 10 mg     | mouth Twice  daily   | tablet    |         | 19       |           |
| tablet               | as needed for Muscle |           |         |          |           |
|                      |  spasms.             |           |         |          |           |
+----------------------+----------------------+-----------+---------+----------+-----------+
|   fludrocortisone    | Take 1 tablet by     |   45      | 3       | 20 |           |
| (FLORINEF) 0.1 mg    | mouth Every other    | tablet    |         | 19       |           |
| tablet               | day.                 |           |         |          |           |
+----------------------+----------------------+-----------+---------+----------+-----------+
|   furosemide (LASIX) | Take 1 tablet by     |   30      | 11      | 20 |           |
|  40 mg               | mouth Daily as       | tablet    |         | 18       |           |
| tabletIndications:   | needed.              |           |         |          |           |
| Edema, unspecified   |                      |           |         |          |           |
| type, FSGS (focal    |                      |           |         |          |           |
| segmental            |                      |           |         |          |           |
| glomerulosclerosis), |                      |           |         |          |           |
|  Hyperlipidemia      |                      |           |         |          |           |
 
+----------------------+----------------------+-----------+---------+----------+-----------+
|   glucose blood VI   | Check blood sugar    |   100     | 12      | 20 |           |
| test strips (ONE     | before each meal and | each      |         | 12       |           |
| TOUCH ULTRA TEST)    |  as directed         |           |         |          |           |
| stripIndications:    |                      |           |         |          |           |
| Unspecified          |                      |           |         |          |           |
| hypertensive kidney  |                      |           |         |          |           |
| disease with chronic |                      |           |         |          |           |
|  kidney disease      |                      |           |         |          |           |
| stage I through      |                      |           |         |          |           |
| stage IV, or         |                      |           |         |          |           |
| unspecified(403.90), |                      |           |         |          |           |
|  Complications of    |                      |           |         |          |           |
| transplanted kidney, |                      |           |         |          |           |
|  Diabetes mellitus   |                      |           |         |          |           |
| type II,             |                      |           |         |          |           |
| uncontrolled (HCC),  |                      |           |         |          |           |
| Hyperlipidemia       |                      |           |         |          |           |
+----------------------+----------------------+-----------+---------+----------+-----------+
|   insulin glargine   | inject 20 units      |   10 vial | 11      | 20 |           |
| (LANTUS) 100         | subcutaneously every |           |         | 18       |           |
| units/mL injection   |  morning             |           |         |          |           |
| (vial)Indications:   |                      |           |         |          |           |
| Type 2 diabetes      |                      |           |         |          |           |
| mellitus with        |                      |           |         |          |           |
| chronic kidney       |                      |           |         |          |           |
| disease, without     |                      |           |         |          |           |
| long-term current    |                      |           |         |          |           |
| use of insulin,      |                      |           |         |          |           |
| unspecified CKD      |                      |           |         |          |           |
| stage (HCC), Kidney  |                      |           |         |          |           |
| replaced by          |                      |           |         |          |           |
| transplant           |                      |           |         |          |           |
+----------------------+----------------------+-----------+---------+----------+-----------+
|   insulin lispro     | inject               |   10 vial | 11      | 11/15/20 |           |
| (HUMALOG) 100        | subcutaneously       |           |         | 17       |           |
| units/mL injection   | BEFORE MEALS         |           |         |          |           |
| (vial)Indications:   | ACCORDING TO SLIDING |           |         |          |           |
| Type 2 diabetes      |  SCALE Max dose 20   |           |         |          |           |
| mellitus with        | units daily          |           |         |          |           |
| complication, with   |                      |           |         |          |           |
| long-term current    |                      |           |         |          |           |
| use of insulin (HCC) |                      |           |         |          |           |
+----------------------+----------------------+-----------+---------+----------+-----------+
|   levothyroxine      | take 1 tablet by     |   30      | 11      | 20 |           |
| (SYNTHROID) 50 mcg   | mouth once daily     | tablet    |         | 18       |           |
| tablet               |                      |           |         |          |           |
+----------------------+----------------------+-----------+---------+----------+-----------+
|   lisinopril         | take 1 tablet by     |   90      | 3       | 20 |           |
| (PRINIVIL,ZESTRIL)   | mouth once daily     | tablet    |         | 17       |           |
| 30 MG tablet         |                      |           |         |          |           |
+----------------------+----------------------+-----------+---------+----------+-----------+
|   loperamide         | Take 1 capsule by    |   60      | 5       | 20 |           |
| (ANTI-DIARRHEAL) 2   | mouth 4 times daily  | capsule   |         | 14       |           |
| mg                   | as needed.           |           |         |          |           |
| capsuleIndications:  |                      |           |         |          |           |
| Unspecified          |                      |           |         |          |           |
| hypertensive kidney  |                      |           |         |          |           |
| disease with chronic |                      |           |         |          |           |
|  kidney disease      |                      |           |         |          |           |
 
| stage I through      |                      |           |         |          |           |
| stage IV, or         |                      |           |         |          |           |
| unspecified(403.90), |                      |           |         |          |           |
|  FSGS (focal         |                      |           |         |          |           |
| segmental            |                      |           |         |          |           |
| glomerulosclerosis), |                      |           |         |          |           |
|  Hypothyroidism,     |                      |           |         |          |           |
| Hyperlipidemia       |                      |           |         |          |           |
+----------------------+----------------------+-----------+---------+----------+-----------+
|   losartan (COZAAR)  | take 1 tablet by     |   90      | 3       | 20 |           |
| 50 mg tablet         | mouth once daily     | tablet    |         | 18       |           |
+----------------------+----------------------+-----------+---------+----------+-----------+
|   magnesium oxide    | take 1 tablet by     |   60      | 11      | 10/02/20 |           |
| (MAG-OX) 400 mg      | mouth twice a day    | tablet    |         | 18       |           |
| tablet               |                      |           |         |          |           |
+----------------------+----------------------+-----------+---------+----------+-----------+
|   predniSONE         | take 1 tablet by     |   90      | 3       | 10/02/20 |           |
| (DELTASONE) 5 mg     | mouth once daily     | tablet    |         | 18       |           |
| tablet               |                      |           |         |          |           |
+----------------------+----------------------+-----------+---------+----------+-----------+
|   Prenatal Vit-Fe    | take 1 tablet by     |   30      | 11      | 20 |           |
| Fumarate-FA (PNV     | mouth once daily.    | tablet    |         | 18       |           |
| PRENATAL PLUS        |                      |           |         |          |           |
| MULTIVITAMIN) 27-1   |                      |           |         |          |           |
| MG TABS              |                      |           |         |          |           |
+----------------------+----------------------+-----------+---------+----------+-----------+
|   Respiratory        | Continue nocturnal   |   1 each  | 0       | 20 |           |
| Therapy Supplies     | O2 at 2 l/m through  |           |         | 18       |           |
| MISCIndications: JACK | CPAP for lifetime.   |           |         |          |           |
|  (obstructive sleep  | Dx: JACK G47.33       |           |         |          |           |
| apnea)               | Please provide new   |           |         |          |           |
|                      | nasal mask for CPAP  |           |         |          |           |
|                      | and any other needed |           |         |          |           |
|                      |  replacement         |           |         |          |           |
|                      | supplies.            |           |         |          |           |
+----------------------+----------------------+-----------+---------+----------+-----------+
|   Respiratory        | Decrease CPAP to     |   1 each  | 0       | 20 |           |
| Therapy Supplies     | 12-18 cmH2O          |           |         | 13       |           |
| MISC                 | Diagnosis            |           |         |          |           |
|                      | Code(s)327.23.       |           |         |          |           |
|                      | Please send order to |           |         |          |           |
|                      |  [facility].     |           |         |          |           |
+----------------------+----------------------+-----------+---------+----------+-----------+
|   rosuvastatin       | take 1 tablet by     |   30      | 11      | 20 |           |
| (CRESTOR) 20 mg      | mouth NIGHTLY        | tablet    |         | 18       |           |
| tablet               |                      |           |         |          |           |
+----------------------+----------------------+-----------+---------+----------+-----------+
|   fludrocortisone    | Take 1 tablet by     |           | 0       |          |  |
| (FLORINEF) 0.1 mg    | mouth Every other    |           |         |          | 9         |
| tablet               | day.                 |           |         |          |           |
+----------------------+----------------------+-----------+---------+----------+-----------+
|   metoprolol         | take 1 tablet by     |   60      | 11      | 20 |  |
| tartrate (LOPRESSOR) | mouth twice a day    | tablet    |         | 19       | 9         |
|  25 mg tablet        |                      |           |         |          |           |
+----------------------+----------------------+-----------+---------+----------+-----------+
|   mycophenolate      | Take 1 capsule by    |   60      | 11      | 20 |  |
| (CELLCEPT) 250 mg    | mouth 2 times daily. | capsule   |         | 18       | 9         |
| capsuleIndications:  |                      |           |         |          |           |
| Kidney replaced by   |                      |           |         |          |           |
| transplant           |                      |           |         |          |           |
 
+----------------------+----------------------+-----------+---------+----------+-----------+
|   QVAR REDIHALER 80  |                      |           | 0       | 20 |  |
| MCG/ACT inhaler      |                      |           |         | 18       | 9         |
+----------------------+----------------------+-----------+---------+----------+-----------+
|   tacrolimus         | Take 1 capsule by    |   240     | 0       | 20 |  |
| (PROGRAF) 1 mg       | mouth 2 times daily. | capsule   |         | 19       | 9         |
| capsuleIndications:  |  For a total dose of |           |         |          |           |
| Kidney replaced by   |  1 mg, by mouth, 2   |           |         |          |           |
| transplant           | times daily.         |           |         |          |           |
+----------------------+----------------------+-----------+---------+----------+-----------+
 documented as of this encounter
 
 Plan of Treatment
 
 
+--------+-----------+------------+----------------------+-------------------+
| Date   | Type      | Specialty  | Care Team            | Description       |
+--------+-----------+------------+----------------------+-------------------+
| / | Office    | Cardiology |   Tonia Wilson,    |                   |
|    | Visit     |            | ARNP  [address]   |                   |
|        |           |            | [place]  |                   |
|        |           |            | [id number] [phone]  |                   |
|        |           |            |  [phone] (Fax)  |                   |
+--------+-----------+------------+----------------------+-------------------+
| / | Hospital  | Radiology  |   Popeye Tonwsend, |                   |
|    | Encounter |            |  MD  [address] |                   |
|        |           |            |  [place]   |                   |
|        |           |            | [place] [address]             |                   |
|        |           |            | [phone]         |                   |
|        |           |            | [phone] (Fax)   |                   |
+--------+-----------+------------+----------------------+-------------------+
| / | Surgery   | Radiology  |   Cary Yinmarissa, | CV EP PPM SYSTEM  |
|    |           |            |  MD  [address] | IMPLANT           |
|        |           |            |  St.  [place],   |                   |
|        |           |            | [place] [address]             |                   |
|        |           |            | [phone]         |                   |
|        |           |            | [phone] (Fax)   |                   |
+--------+-----------+------------+----------------------+-------------------+
| 02/10/ | Clinical  | Cardiology |                      |                   |
|    | Support   |            |                      |                   |
+--------+-----------+------------+----------------------+-------------------+
| / | Office    | Cardiology |   Tonia Wilson,    |                   |
|    | Visit     |            | [facility]  [address]   |                   |
|        |           |            |   [place]  |                   |
|        |           |            | [address]  [phone]  |                   |
|        |           |            |  [phone] (Fax)  |                   |
+--------+-----------+------------+----------------------+-------------------+
| / | Off-Site  | Nephrology |   Marzena Moser  |                   |
2020   | Visit     |            | DO ETIENNE  [address]      |                   |
|        |           |            | [address]      |                   |
|        |           |            | [place]      |                   |
|        |           |            | [address]  [phone]  |                   |
|        |           |            |  [phone] (Fax)  |                   |
+--------+-----------+------------+----------------------+-------------------+
 documented as of this encounter
 
 Procedures
 
 
+----------------------+--------+-------------+----------------------+----------------------
 
+
| Procedure Name       | Priori | Date/Time   | Associated Diagnosis | Comments             
|
|                      | ty     |             |                      |                      
|
+----------------------+--------+-------------+----------------------+----------------------
+
| NM NUCLEAR STRESS    | Routin | 10/30/2019  |   Coronary artery    |   Results for this   
|
| TEST (PHARMACOLOGIC  | e      | 12:56 PM    | disease involving    | procedure are in the 
|
| - VASODILATOR)       |        | PDT         | native coronary      |  results section.    
|
|                      |        |             | artery of native     |                      
|
|                      |        |             | heart without angina |                      
|
|                      |        |             |  pectoris            |                      
|
|                      |        |             | Hypertension,        |                      
|
|                      |        |             | essential  Mixed     |                      
|
|                      |        |             | hyperlipidemia       |                      
|
+----------------------+--------+-------------+----------------------+----------------------
+
 documented in this encounter
 
 Visit Diagnoses
 Not on filedocumented in this encounter
 
 Administered Medications
 
 
+---------------------------------+--------+----------+----------+------+------+
| Medication Order                | MAR    | Action   | Dose     | Rate | Site |
|                                 | Action | Date     |          |      |      |
+---------------------------------+--------+----------+----------+------+------+
|   technetium TC-99M sestamibi   | Given  | 10/30/20 | 34.1     |      |      |
| (CARDIOLITE) injection 34.1     |        | 19 12:55 | millicur |      |      |
| millicurie  34.1 millicurie,    |        |  PM PDT  | ies      |      |      |
| Intravenous, ONCE PRN, Other,   |        |          |          |      |      |
| Starting Wed 10/30/19 at 1255,  |        |          |          |      |      |
| For 1 dose, Nuclear Medicine    |        |          |          |      |      |
+---------------------------------+--------+----------+----------+------+------+
 
 
 
+---+---+
|   |   |
+---+---+
 documented in this encounter

## 2020-01-08 NOTE — XMS
Encounter Summary
  Created on: 2020
 
 Viviana Ricci
 External Reference #: [id number]
 : 47
 Sex: Female
 
 Demographics
 
 
+-----------------------+----------------------+
| Address               | [address]      |
|                       | [place]  [address] |
+-----------------------+----------------------+
| Home Phone            | [phone]      |
+-----------------------+----------------------+
| Preferred Language    | Unknown              |
+-----------------------+----------------------+
| Marital Status        | Single               |
+-----------------------+----------------------+
| Synagogue Affiliation | [id number]                 |
+-----------------------+----------------------+
| Race                  | Unknown              |
+-----------------------+----------------------+
| Ethnic Group          | Unknown              |
+-----------------------+----------------------+
 
 
 Author
 
 
+--------------+--------------------------------------------+
| Author       | [facility] [place]  |
|              | and [place]                                |
+--------------+--------------------------------------------+
| Organization | [facility] [place]  |
|              | and [place]                                |
+--------------+--------------------------------------------+
| Address      | Unknown                                    |
+--------------+--------------------------------------------+
| Phone        | Unavailable                                |
+--------------+--------------------------------------------+
 
 
 
 Support
 
 
+----------------+--------------+---------------------+-----------------+
| Name           | Relationship | Address             | Phone           |
+----------------+--------------+---------------------+-----------------+
| Ada/Ed Radhames | ECON         | [place]              | [phone] |
|                |              | [place]  |                 |
|                |              |  [address]              |                 |
+----------------+--------------+---------------------+-----------------+
 
 
 
 
 Care Team Providers
 
 
+-----------------------+------+-------------+
| Care Team Member Name | Role | Phone       |
+-----------------------+------+-------------+
 PCP  | Unavailable |
+-----------------------+------+-------------+
 
 
 
 Reason for Visit
 
 
+-------------------+----------+
| Reason            | Comments |
+-------------------+----------+
| Medication Refill |          |
+-------------------+----------+
 
 
 
 Encounter Details
 
 
+--------+--------+---------------------+----------------------+-------------------+
| Date   | Type   | Department          | Care Team            | Description       |
+--------+--------+---------------------+----------------------+-------------------+
| / | Refill |   [facility]         |   Marzena Moser  | Medication Refill |
| 2018   |        | NEPHROLOGY  [address]   | M, DO  [address]      |                   |
|        |        | [address]   | [address]      |                   |
|        |        | [place], [place]     | [place], [place]      |                   |
|        |        | [address]          | [address]  [phone]  |                   |
|        |        | [phone]        |  [phone] (Fax)  |                   |
+--------+--------+---------------------+----------------------+-------------------+
 
 
 
 Social History
 
 
+--------------+-------+-----------+--------+------+
| Tobacco Use  | Types | Packs/Day | Years  | Date |
|              |       |           | Used   |      |
+--------------+-------+-----------+--------+------+
| Never Smoker |       |           |        |      |
+--------------+-------+-----------+--------+------+
 
 
 
+---------------------+---+---+---+
| Smokeless Tobacco:  |   |   |   |
| Never Used          |   |   |   |
+---------------------+---+---+---+
 
 
 
+---------------------------------------------------------------+
| Comments: some second hand smoke exposure, but fairly minimal |
 
+---------------------------------------------------------------+
 
 
 
+-------------+-------------+---------+----------+
| Alcohol Use | Drinks/Week | oz/Week | Comments |
+-------------+-------------+---------+----------+
| No          |             |         |          |
+-------------+-------------+---------+----------+
 
 
 
+------------------+---------------+
| Sex Assigned at  | Date Recorded |
| Birth            |               |
+------------------+---------------+
| Not on file      |               |
+------------------+---------------+
 
 
 
+----------------+-------------+-------------+
| Job Start Date | Occupation  | Industry    |
+----------------+-------------+-------------+
| Not on file    | Not on file | Not on file |
+----------------+-------------+-------------+
 
 
 
+----------------+--------------+------------+
| Travel History | Travel Start | Travel End |
+----------------+--------------+------------+
 
 
 
+-------------------------------------+
| No recent travel history available. |
+-------------------------------------+
 documented as of this encounter
 
 Plan of Treatment
 
 
+--------+-----------+------------+----------------------+-------------------+
| Date   | Type      | Specialty  | Care Team            | Description       |
+--------+-----------+------------+----------------------+-------------------+
| / | Office    | Cardiology |   HellalfredoTonia,    |                   |
|    | Visit     |            | ARNP  [address]   |                   |
|        |           |            | St  [place], [place]  |                   |
|        |           |            | [address]  [phone]  |                   |
|        |           |            |  [phone] (Fax)  |                   |
+--------+-----------+------------+----------------------+-------------------+
| / | Hospital  | Radiology  |   Popeye Townsend, |                   |
|    | Encounter |            |  MD  [address] |                   |
|        |           |            |  St.  [place],   |                   |
|        |           |            | [place] [address]             |                   |
|        |           |            | [phone]         |                   |
|        |           |            | [phone] (Fax)   |                   |
+--------+-----------+------------+----------------------+-------------------+
| / | Surgery   | Radiology  |   Popeye Townsend, | CV EP PPM SYSTEM  |
 
|    |           |            |  MD  [address] | IMPLANT           |
|        |           |            |  St.  [place],   |                   |
|        |           |            | [place] [address]             |                   |
|        |           |            | [phone]         |                   |
|        |           |            | [phone] (Fax)   |                   |
+--------+-----------+------------+----------------------+-------------------+
| 02/10/ | Clinical  | Cardiology |                      |                   |
|    | Support   |            |                      |                   |
+--------+-----------+------------+----------------------+-------------------+
| / | Office    | Cardiology |   Tonia Wilson,    |                   |
|    | Visit     |            | ARNP  [address]   |                   |
|        |           |            | [place]  |                   |
|        |           |            | [address]  [phone]  |                   |
|        |           |            |  [phone] (Fax)  |                   |
+--------+-----------+------------+----------------------+-------------------+
| / | Off-Site  | Nephrology |   Marzena Moser  |                   |
|    | Visit     |            | DO ETIENNE  [address] [place] [address]      |                   |
|        |           |            | [address]      |                   |
|        |           |            | [place]      |                   |
|        |           |            | [address]  [phone]  |                   |
|        |           |            |  [phone] (Fax)  |                   |
+--------+-----------+------------+----------------------+-------------------+
 documented as of this encounter
 
 Visit Diagnoses
 
 
+---------------------------------+
| Diagnosis                       |
+---------------------------------+
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
+---------------------------------+
 documented in this encounter

## 2020-01-08 NOTE — XMS
Encounter Summary
  Created on: 2020
 
 Viviana Ricci
 External Reference #: [id number]
 : 47
 Sex: Female
 
 Demographics
 
 
+-----------------------+----------------------+
| Address               | [address]      |
|                       | [place]  [address] |
+-----------------------+----------------------+
| Home Phone            | [phone]      |
+-----------------------+----------------------+
| Preferred Language    | Unknown              |
+-----------------------+----------------------+
| Marital Status        | Single               |
+-----------------------+----------------------+
| Alevism Affiliation | [id number]                 |
+-----------------------+----------------------+
| Race                  | Unknown              |
+-----------------------+----------------------+
| Ethnic Group          | Unknown              |
+-----------------------+----------------------+
 
 
 Author
 
 
+--------------+--------------------------------------------+
| Author       | [facility]  |
|              | and [facility]                                |
+--------------+--------------------------------------------+
| Organization | [facility]  |
|              | and [facility]                                |
+--------------+--------------------------------------------+
| Address      | Unknown                                    |
+--------------+--------------------------------------------+
| Phone        | Unavailable                                |
+--------------+--------------------------------------------+
 
 
 
 Support
 
 
+----------------+--------------+---------------------+-----------------+
| Name           | Relationship | Address             | Phone           |
+----------------+--------------+---------------------+-----------------+
| Ada/Ed Radhames | ECON         | [address]              | [phone] |
|                |              | [place]  |                 |
|                |              |  [address]              |                 |
+----------------+--------------+---------------------+-----------------+
 
 
 
 
 Care Team Providers
 
 
+-----------------------+------+-------------+
| Care Team Member Name | Role | Phone       |
+-----------------------+------+-------------+
 PCP  | Unavailable |
+-----------------------+------+-------------+
 
 
 
 Reason for Visit
 
 
+------------------+----------+
| Reason           | Comments |
+------------------+----------+
| Follow-up        |          |
+------------------+----------+
| Coronary Artery  |          |
| Disease          |          |
+------------------+----------+
| Chest Pain       |          |
+------------------+----------+
 
 
 
 Encounter Details
 
 
+--------+---------+----------------------+----------------------+-------------------+
| Date   | Type    | Department           | Care Team            | Description       |
+--------+---------+----------------------+----------------------+-------------------+
| / | Office  |   [facility]          |   Tonia Wilson,    | Coronary artery   |
|    | Visit   | CARDIOLOGY  [address]    | ARNP  [address]   | disease (Primary  |
|        |         | [address]  [place], | [place]  [place], [place]  | Dx); HTN          |
|        |         |  [place] [address]       | [address]  [phone]  | (hypertension);   |
|        |         | [phone]         |  [phone] (Fax)  | Hyperlipidemia;   |
|        |         |                      |                      | Dyspnea           |
+--------+---------+----------------------+----------------------+-------------------+
 
 
 
 Social History
 
 
+--------------+-------+-----------+--------+------+
| Tobacco Use  | Types | Packs/Day | Years  | Date |
|              |       |           | Used   |      |
+--------------+-------+-----------+--------+------+
| Never Smoker |       |           |        |      |
+--------------+-------+-----------+--------+------+
 
 
 
+---------------------+---+---+---+
| Smokeless Tobacco:  |   |   |   |
| Never Used          |   |   |   |
+---------------------+---+---+---+
 
 
 
 
+---------------------------------------------------------------+
| Comments: some second hand smoke exposure, but fairly minimal |
+---------------------------------------------------------------+
 
 
 
+-------------+-------------+---------+----------+
| Alcohol Use | Drinks/Week | oz/Week | Comments |
+-------------+-------------+---------+----------+
| No          |             |         |          |
+-------------+-------------+---------+----------+
 
 
 
+------------------+---------------+
| Sex Assigned at  | Date Recorded |
| Birth            |               |
+------------------+---------------+
| Not on file      |               |
+------------------+---------------+
 
 
 
+----------------+-------------+-------------+
| Job Start Date | Occupation  | Industry    |
+----------------+-------------+-------------+
| Not on file    | Not on file | Not on file |
+----------------+-------------+-------------+
 
 
 
+----------------+--------------+------------+
| Travel History | Travel Start | Travel End |
+----------------+--------------+------------+
 
 
 
+-------------------------------------+
| No recent travel history available. |
+-------------------------------------+
 documented as of this encounter
 
 Last Filed Vital Signs
 
 
+-------------------+-------------------+----------------------+----------+
| Vital Sign        | Reading           | Time Taken           | Comments |
+-------------------+-------------------+----------------------+----------+
| Blood Pressure    | 126/84            | 2014 12:11 PM  |          |
|                   |                   | PDT                  |          |
+-------------------+-------------------+----------------------+----------+
| Pulse             | 64                | 2014 12:11 PM  | regular  |
|                   |                   | PDT                  |          |
+-------------------+-------------------+----------------------+----------+
| Temperature       | -                 | -                    |          |
+-------------------+-------------------+----------------------+----------+
| Respiratory Rate  | 18                | 2014 12:11 PM  |          |
 
|                   |                   | PDT                  |          |
+-------------------+-------------------+----------------------+----------+
| Oxygen Saturation | -                 | -                    |          |
+-------------------+-------------------+----------------------+----------+
| Inhaled Oxygen    | -                 | -                    |          |
| Concentration     |                   |                      |          |
+-------------------+-------------------+----------------------+----------+
| Weight            | 125.2 kg (276 lb) | 2014 12:11 PM  |          |
|                   |                   | PDT                  |          |
+-------------------+-------------------+----------------------+----------+
| Height            | 167.6 cm (5' 6")  | 2014 12:11 PM  |          |
|                   |                   | PDT                  |          |
+-------------------+-------------------+----------------------+----------+
| Body Mass Index   | 44.55             | 2014 12:11 PM  |          |
|                   |                   | PDT                  |          |
+-------------------+-------------------+----------------------+----------+
 documented in this encounter
 
 Progress Notes
 Tonia Wilson ARNP - 2014 12:14 PM PDTFormatting of this note might be different fr
om the original.
    
 
 PATIENT NAME:  Viviana Ricci  
 : 1945:         AGE: 68 y.o.
  PRIMARY CARE:
 Marzena Moser DO 
 
 OUTPATIENT FOLLOW UP VISIT
 
 Date of Service: 2014
 
 HISTORY OF PRESENT ILLNESS:
 Viviana Ricci is a 68 y.o. female with a history of CAD post CABG x 3 in , history of 
diabetes, renal failure post a renal transplantation, morbid obesity, inactivity, hypertensi
on, hyperlipidemia, pulmonary hypertension and severe arthritis.  She is being seen today fo
r follow up coronary artery disease and risk assessment for knee surgery.
 
 She was last seen 14 at which time she was scheduled for a nuclear medicine stress deb
t for risk stratification of her coronary artery disease for preoperative risk assessment.  
Since that time, she reports she has been feeling about the same.  She has shortness of ermelinda
th with activity such as carrying her groceries up to 3 steps into her house, primarily when
 she is in more pain, and not as much on other days when her pain in her back and knee is be
tter.  She has difficulty with mobility due to her left knee pain, so it is difficult to kael
luate her functional status.  She has not had any symptoms of chest pain at rest or with act
ivity.  She denies any lightheadedness or dizziness or palpitations.  She chronically gets a
 little bit of ankle swelling by the end of the day, and she feels that it is better than it
 used to be on her current dose of furosemide.  She lays flat at night on one pillow with he
r CPAP machine in place, and has no symptoms of shortness of breath with this.
 
 MEDICAL, SURGICAL, AND PERSONAL HISTORY
 Past Medical, Surgical, Family, and Social History are reviewed in EPIC.
 
 CURRENT PROBLEMS
 Patient Active Problem List 
 Diagnosis 
   Chronic low back pain 
   Hypothyroidism 
   Hyperlipidemia 
   Complications of transplanted kidney 
 
   Movement disorder 
   Diabetes mellitus type II, uncontrolled (HCC) 
   Pulmonary hypertension (HCC) 
   Coronary artery disease 
   Unspecified hypertensive kidney disease with chronic kidney disease stage I through sta
ge IV, or unspecified 
   JACK on CPAP 
   Obesity hypoventilation syndrome 
   Kidney replaced by transplant 
   Secondary hyperparathyroidism 
   Dyspnea 
   FSGS (focal segmental glomerulosclerosis) 
   Murmur 
   Cardiomegaly 
   HTN (hypertension) 
   PLMD (periodic limb movement disorder) 
   Chest pain 
 
 CURRENT MEDICATIONS
 Outpatient Encounter Prescriptions as of 2014 
 Medication Sig Dispense Refill 
   allopurinol (ZYLOPRIM) 100 mg tablet Take 1 tablet by mouth Daily.  30 tablet  11 
   aspirin 81 MG EC tablet Take 81 mg by mouth Daily.     
   cholecalciferol (VITAMIN D-3) 2000 UNITS TABS Take 1,000 Units by mouth Three times a w
[place].     
   cinacalcet (SENSIPAR) 30 mg tablet Take 1 tablet by mouth Daily.  30 tablet  12 
   fludrocortisone (FLORINEF) 0.1 mg tablet Take 1 tablet by mouth Every other day.  45 ta
blet  2 
   fluticasone (FLOVENT HFA) 220 mcg/puff inhaler Inhale 1 puff into the lungs 2 times shante
ly. Rinse mouth after use.  1 Inhaler  11 
   furosemide (LASIX) 40 mg tablet Take 1 tablet by mouth Daily.  30 tablet  5 
   glucose blood VI test strips (ONE TOUCH ULTRA TEST) strip Check blood sugar before each
 meal and as directed  100 each  12 
   insulin glargine (LANTUS) 100 units/mL injection Inject 20 Units under the skin every m
orning.  10 mL  prn 
   insulin lispro (HUMALOG) 100 units/mL injection Inject subcutaneously before meals acco
rding to sliding scale  10 pen  5 
   Insulin Syringe-Needle U-100 (BD INSULIN SYRINGE ULTRAFINE) 31G X 5/16" 0.5 ML MISC Use
 before meals and as directed.  100 each  11 
   levothyroxine (LEVOTHROID) 50 mcg tablet Take 1 tablet by mouth Daily.  30 tablet  11 
   lisinopril (PRINIVIL,ZESTRIL) 30 MG tablet Take 1 tablet by mouth Daily.  90 tablet  3 
   loperamide (ANTI-DIARRHEAL) 2 mg capsule Take 2 mg by mouth Daily as needed.     
   magnesium oxide (MAG-OX) 400 mg tablet Take 1 tablet by mouth 2 times daily.  62 tablet
  12 
   metoprolol tartrate (LOPRESSOR) 25 mg tablet Take 1 tablet by mouth 2 times daily.  60 
tablet  11 
   mycophenolate (CELLCEPT) 250 mg capsule Take 250 mg by mouth 3 times daily.     
   omeprazole (PRILOSEC) 20 mg capsule Take one capsule by mouth once daily on an empty st
omach  90 capsule  3 
   predniSONE (DELTASONE) 5 mg tablet Take 1 tablet by mouth Daily.  90 tablet  3 
   Prenatal Multivit-Min-Fe-FA (PRENATAL VITAMINS) 0.8 MG TABS Take 0.8 mg by mouth Daily.
  30 each  11 
   Prenatal Vit-Fe Fumarate-FA (PNV PRENATAL PLUS MULTIVITAMIN) 27-1 MG TABS      
   Respiratory Therapy Supplies MISC Decrease CPAP to 12-18 cmH2O Diagnosis Code(s)327.23.
 Please send order to [facility].  1 each  0 
   rosuvastatin (CRESTOR) 20 mg tablet Take 1 tablet by mouth nightly.  30 tablet  11 
   tacrolimus (PROGRAF) 0.5 mg capsule TAD.     
   tacrolimus (PROGRAF) 1 mg capsule Take 1.5 mg by mouth 2 times daily.     
   valsartan (DIOVAN) 160 mg tablet Take 1 tablet by mouth Daily.  30 tablet  11 
  
 
 
 ALLERGIES
 No Known Allergies
 
 ROS
 Review of Systems 
 Constitutional: Positive for malaise/fatigue. 
 Respiratory: Positive for shortness of breath.  
 Cardiovascular: Negative for chest pain, palpitations, orthopnea, leg swelling and PND. 
 Gastrointestinal: Negative for abdominal pain. 
 Musculoskeletal: Positive for back pain and joint pain. 
 Neurological: Negative for dizziness and loss of consciousness. 
 
 OBJECTIVE:  
 
 PHYSICAL EXAM
 /84 | Pulse 64 | Resp 18 | Ht 1.676 m (5' 6") | Wt 125.193 kg (276 lb) | BMI 44.57 kg
/m2
 Physical Exam 
 Constitutional: She is oriented to person, place, and time. She appears well-developed and 
well-nourished. 
      Adult female in no acute distress 
 Neck: Normal carotid pulses and no JVD (Difficult to fully evaluate due to body habitus) pr
esent. Carotid bruit is not present. 
 Cardiovascular: Normal rate, regular rhythm, S1 normal, S2 normal and intact distal pulses.
  PMI is not displaced.  Exam reveals no gallop and no friction rub.  
 Murmur heard.
  Holosystolic murmur is present with a grade of 1/6 
 Pulses:
      Carotid pulses are 2+ on the right side, and 2+ on the left side.
      Posterior tibial pulses are 1+ on the right side, and 1+ on the left side. 
 Pulmonary/Chest: Effort normal and breath sounds normal. No accessory muscle usage. No resp
iratory distress. She has no wheezes. She has no rhonchi. She has no rales. 
 Abdominal: Soft. Normal appearance and normal aorta. She exhibits no abdominal bruit. There
 is no hepatosplenomegaly. There is no tenderness. 
      Obese 
 Musculoskeletal: She exhibits edema (trace bilaterally at ankles, some venous stasis skin c
hanges noted below knees). 
 Neurological: She is alert and oriented to person, place, and time. Gait (using cane for am
bulation) abnormal. 
 Skin: Skin is warm and dry. No cyanosis. Nails show no clubbing. 
 Psychiatric: She has a normal mood and affect. Her mood appears not anxious. She does not e
xhibit a depressed mood. 
 
 ECG: I personally reviewed EKG tracing from 14: Sinus rhythm with first degree AV bloc
k, rate of 60 beats per minute.  See scanned document for further interpretation. 
 
 LAB RESULTS: 
 LIPID
 Lab Results 
 Component Value Date 
  CHOL 162 2013 
  TRIG 225 2013 
  HDL 45 2013 
  LDL 72 2013 
  LDLEX 65 3/12/2014 
  HDLEX 50.2 3/12/2014 
  TRIGEX 197 3/12/2014 
  CHOLEX 155 3/12/2014 
 
 
 CHEMISTRY
 Lab Results 
 Component Value Date 
   3/12/2014 
   3/12/2014 
  K 5.0 3/12/2014 
   3/12/2014 
  CO2 21 3/12/2014 
  CALCIUM 10.2 3/12/2014 
  ALKPHOS 102 3/12/2014 
  AST 20 7/10/2013 
  ASTEX 23 3/12/2014 
  ALT 14 7/10/2013 
  ALTEX 16 3/12/2014 
  BILITOT 0.5 3/12/2014 
  CREA 1.7 7/10/2013 
  BUN 44 3/12/2014 
  EGFR 31.0 7/10/2013 
  EGFREX 37 3/12/2014 
  CREEX 1.43 3/12/2014 
 
 HEMATOLOGY
 Lab Results 
 Component Value Date 
  WBC 4.6 7/10/2013 
  HGB 11.9 7/10/2013 
  HCT 35.7 7/10/2013 
  * 7/10/2013 
  HGBEX 13.3 3/12/2014 
 
 I reviewed records from Dr. Boykin for office visit on 14.
 
 Nuclear stress test 14 Persantine EKG is negative. Normal Persantine Sestamibi myocar
dial perfusion study with a normal left ventricular size and wall thickness. Preserved left 
ventricular systolic function. LVEF by gated SPECT 78%.
 
 ASSESSMENT:  
 
 1. Coronary artery disease pre-operative clearance prior to the knee surgery
 A.  Status post CABG x 3 in  with LIMA to the LAD, SVG to the OM1 and OM2.
             B.  A persantine sestamibi stress test on 07 revealed a medium sized, mod
erate in severity fixed defect of the anterior wall, and a small sized mild in severity fixe
d defect of the inferior wall, LVEF by gated SPECT was 66%.
  C.  Bilateral heart catheterization on 05/03/10, shows severe two vessel coronary artery d
isease, a chronic occlusion through the proximal portion of the left anterior descending art
adele and a chronic occlusion through the proximal portion of the left circumflex artery, rani
ts: open left internal mammary artery graft to the mid left anterior descending artery, open
 saphenous vein grafts to the OM1 and OM2, mildly dilated left ventricular cavity with a nor
mal left systolic function, LVEF 70%, mild to moderate pulmonary hypertension and a normal c
ardiac output, coronary circulation is right dominant, normal system pressure.
  D.  Persantine nuclear medicine stress test 14 shows a normal myocardial perfusion im
aging study with normal left ventricular size, wall thickness, LVEF by gated SPECT of 78%.  
  E.  Today she denies any chest pain Because of the severe knee and back pain, her physical
 activity is very limited.  She ambulates very little using a walker. In the past 3 weeks, s
he has used a stationary bicycle up to 5 minutes without chest pain but admits having some t
rouble breathing.  There is no signs and symptoms of overt congestive heart failure.  She is
 in a class II of [place] Heart Association functional class.  There is no fluid retention 
on physical examination.  Her stress test is a low risk study.  She remains on a beta blocke
r.
 2. Severe arthritis
 
  A. Patient state that she is suffering from a knee pain secondary to a "knock kneed".  She
 is being seen by the orthopedic surgeon for a possible knee surgery/correction.   
 3. Right sided heart failure/ cor pulmonale / severe pulmonary hypertension:
             A. The echocardiogram from 02/19/10 revealed moderate bi-atrial dilatation and 
normal left ventricular size, wall thickness and motion, LVEF is 55-60%, dilated right ventr
icle with moderate hypo-kinesis, moderate tricuspid valve regurgitation, severe pulmonary hy
pertension with a peak systolic pressure of 80 mmHg, and a small pericardial effusion. 
  B. Echocardiogram from 4/15/14 showed Mild left atrial dilatation. Normal left ventricular
 size, wall thickness and motion. Preserved  left ventricular systolic function. LVEF is 70-
75%. Grade 1 left ventricular diastole dysfunction. Mild tricuspid valve regurgitation. Mild
 thickened trileaflet aortic valve with adequate opening. A mild aortic valve insufficiency.
 Normal right-sided pressure.
 4. Hypertension:
  A.  Today her blood pressure is well-controlled.
 5. Obstructive sleep apnea:
  A.  She uses a CPAP machine at night with 2 L of oxygen bled in.
 6.  Morbid obesity.
 7. Type II diabetes.
 8. Chronic kidney disease:
  A.  Renal Allograft, FSGS in renal allograft--clinically stable. Followed by Dr. Moser.
 
 PLAN:  
 
 1. In discussion with Dr. Townsend, the patient is assessed to carry a low to moderate ris
k for perioperative cardiac event with left knee replacement.  She does not require any furt
her medication changes or cardiac testing, and may proceed with scheduling surgery.
 2. She will otherwise continue with her current medical regimen.
 3. She will followup in 6 months, or sooner with concerns.
 
 I, BELKIS Arredondo, saw this patient under the direct supervision of Popeye Townsend MD
 
 Electronically signed by: 
 BELKIS Arredondo on 2014 at 12:14
 
 Portions of this chart may have been created with Dragon voice recognition software. Occasi
onal wrong-word or   sound-alike  substitutions may have occurred due to the inherent naqvi
itations of voice recognition software. Please read the chart carefully and recognize, using
 context, where these substitutions have occurred.
 Electronically signed by BELKIS Arredondo at 2014  1:05 PM PDTdocumented in this e
ncounter
 
 Plan of Treatment
 
 
+--------+-----------+------------+----------------------+-------------------+
| Date   | Type      | Specialty  | Care Team            | Description       |
+--------+-----------+------------+----------------------+-------------------+
| / | Office    | Cardiology |   Tonia Wilson,    |                   |
|    | Visit     |            | ARNP  [address]   |                   |
|        |           |            | St  [place], [place]  |                   |
|        |           |            | [address]  [phone]  |                   |
|        |           |            |  [phone] (Fax)  |                   |
+--------+-----------+------------+----------------------+-------------------+
| / | Hospital  | Radiology  |   Popeye Townsend, |                   |
|    | Encounter |            |  MD  [address] |                   |
|        |           |            |  [place],   |                   |
|        |           |            | [place] [address]             |                   |
|        |           |            | [phone]         |                   |
|        |           |            | [phone] (Fax)   |                   |
 
+--------+-----------+------------+----------------------+-------------------+
| / | Surgery   | Radiology  |   Popeye Townsend, | CV EP PPM SYSTEM  |
|    |           |            |  MD  [address] | IMPLANT           |
|        |           |            |  St.  [place],   |                   |
|        |           |            | [place] [address]             |                   |
|        |           |            | [phone]         |                   |
|        |           |            | [phone] (Fax)   |                   |
+--------+-----------+------------+----------------------+-------------------+
| 02/10/ | Clinical  | Cardiology |                      |                   |
|    | Support   |            |                      |                   |
+--------+-----------+------------+----------------------+-------------------+
| / | Office    | Cardiology |   Tonia Wilson,    |                   |
|    | Visit     |            | [facility]  [address]   |                   |
|        |           |            | [place]  |                   |
|        |           |            | [address]  [phone]  |                   |
|        |           |            |  [phone] (Fax)  |                   |
+--------+-----------+------------+----------------------+-------------------+
| / | Off-Site  | Nephrology |   Marzena Moser  |                   |
|    | Visit     |            | DO [facility]  [address] [place] [address]      |                   |
|        |           |            | [address]      |                   |
|        |           |            | [place]      |                   |
|        |           |            | [address]  [phone]  |                   |
|        |           |            |  [phone] (Fax)  |                   |
+--------+-----------+------------+----------------------+-------------------+
 documented as of this encounter
 
 Procedures
 
 
+-------------------+--------+-------------+----------------------+----------------------+
| Procedure Name    | Priori | Date/Time   | Associated Diagnosis | Comments             |
|                   | ty     |             |                      |                      |
+-------------------+--------+-------------+----------------------+----------------------+
| ECG 12 LEAD  - PB | Routin | 2014  |   Coronary artery    |   Results for this   |
|                   | e      |  1:06 PM    | disease              | procedure are in the |
|                   |        | PDT         |                      |  results section.    |
+-------------------+--------+-------------+----------------------+----------------------+
 documented in this encounter
 
 Results
 ECG 12 lead (2014  1:06 PM PDT)
 
+---------------------------------------------------------------------+--------------+
| Narrative                                                           | Performed At |
+---------------------------------------------------------------------+--------------+
|   [facility]       2014 13:06  14: Sinus rhythm  |              |
| with first degree AV block, rate of 60   beats per minute.   See    |              |
| scanned ECG for further interpretation by   Dr. Townsend.          |              |
+---------------------------------------------------------------------+--------------+
 
 
 
+-------------------------------------------------------------------------------------+
| Procedure Note                                                                      |
+-------------------------------------------------------------------------------------+
|   Tonia Wilson ARNP - 2014  1:06 PM PDT  14: Sinus rhythm with first  |
| degree AV block, rate of 60 beats per minute.  See scanned ECG for further          |
| interpretation by Dr. Townsend.                                                    |
+-------------------------------------------------------------------------------------+
 documented in this encounter
 
 
 Visit Diagnoses
 
 
+---------------------------------------------------------------------------------------+
| Diagnosis                                                                             |
+---------------------------------------------------------------------------------------+
|   Coronary artery disease - Primary  Coronary atherosclerosis of unspecified type of  |
| vessel, native or graft                                                               |
+---------------------------------------------------------------------------------------+
|   HTN (hypertension)  Unspecified essential hypertension                              |
+---------------------------------------------------------------------------------------+
|   Hyperlipidemia  Other and unspecified hyperlipidemia                                |
+---------------------------------------------------------------------------------------+
|   Dyspnea  Other dyspnea and respiratory abnormality                                  |
+---------------------------------------------------------------------------------------+
 documented in this encounter

## 2020-01-08 NOTE — XMS
Encounter Summary
  Created on: 2020
 
 Viviana Ricci
 External Reference #: [id number]
 : 47
 Sex: Female
 
 Demographics
 
 
+-----------------------+----------------------+
| Address               | [address]      |
|                       | [place]  [address] |
+-----------------------+----------------------+
| Home Phone            | [phone]      |
+-----------------------+----------------------+
| Preferred Language    | Unknown              |
+-----------------------+----------------------+
| Marital Status        | Single               |
+-----------------------+----------------------+
| Yarsanism Affiliation | [id number]                 |
+-----------------------+----------------------+
| Race                  | Unknown              |
+-----------------------+----------------------+
| Ethnic Group          | Unknown              |
+-----------------------+----------------------+
 
 
 Author
 
 
+--------------+--------------------------------------------+
| Author       | [facility]  |
|              | and [place]                                |
+--------------+--------------------------------------------+
| Organization | [facility]  |
|              | and [place]                                |
+--------------+--------------------------------------------+
| Address      | Unknown                                    |
+--------------+--------------------------------------------+
| Phone        | Unavailable                                |
+--------------+--------------------------------------------+
 
 
 
 Support
 
 
+----------------+--------------+---------------------+-----------------+
| Name           | Relationship | Address             | Phone           |
+----------------+--------------+---------------------+-----------------+
| Ada/Ed Radhames | ECON         | [address]              | [phone] |
|                |              | [place]  |                 |
|                |              |  [address]              |                 |
+----------------+--------------+---------------------+-----------------+
 
 
 
 
 Care Team Providers
 
 
+-----------------------+------+-------------+
| Care Team Member Name | Role | Phone       |
+-----------------------+------+-------------+
 PCP  | Unavailable |
+-----------------------+------+-------------+
 
 
 
 Encounter Details
 
 
+--------+-----------+----------------------+-----------+-------------+
| Date   | Type      | Department           | Care Team | Description |
+--------+-----------+----------------------+-----------+-------------+
| 10/26/ | Hospital  |   [facility] |           |             |
|    | Encounter |  [facility] |           |             |
|        |           |   [address]       |           |             |
|        |           | [place]      |           |             |
|        |           | [address]           |           |             |
|        |           | [phone]         |           |             |
+--------+-----------+----------------------+-----------+-------------+
 
 
 
 Social History
 
 
+----------------+-------+-----------+--------+------+
| Tobacco Use    | Types | Packs/Day | Years  | Date |
|                |       |           | Used   |      |
+----------------+-------+-----------+--------+------+
| Never Assessed |       |           |        |      |
+----------------+-------+-----------+--------+------+
 
 
 
+------------------+---------------+
| Sex Assigned at  | Date Recorded |
| Birth            |               |
+------------------+---------------+
| Not on file      |               |
+------------------+---------------+
 
 
 
+----------------+-------------+-------------+
| Job Start Date | Occupation  | Industry    |
+----------------+-------------+-------------+
| Not on file    | Not on file | Not on file |
+----------------+-------------+-------------+
 
 
 
+----------------+--------------+------------+
| Travel History | Travel Start | Travel End |
+----------------+--------------+------------+
 
 
 
 
+-------------------------------------+
| No recent travel history available. |
+-------------------------------------+
 documented as of this encounter
 
 Plan of Treatment
 
 
+--------+-----------+------------+----------------------+-------------------+
| Date   | Type      | Specialty  | Care Team            | Description       |
+--------+-----------+------------+----------------------+-------------------+
| / | Office    | Cardiology |   Tonia Wilson,    |                   |
|    | Visit     |            | ARNJESSICA  [address]   |                   |
|        |           |            | St  [place] [facility], [place]  |                   |
|        |           |            | [address]  [phone]  |                   |
|        |           |            |  [phone] (Fax)  |                   |
+--------+-----------+------------+----------------------+-------------------+
| / | Hospital  | Radiology  |   Popeye Townsend, |                   |
|    | Encounter |            |  MD  [address] |                   |
|        |           |            |  St.  [place],   |                   |
|        |           |            | [place] [address]             |                   |
|        |           |            | [phone]         |                   |
|        |           |            | [phone] (Fax)   |                   |
+--------+-----------+------------+----------------------+-------------------+
| / | Surgery   | Radiology  |   Popeye Townsend, | CV EP PPM SYSTEM  |
|    |           |            |  MD  [address] | IMPLANT           |
|        |           |            |  St.  [place],   |                   |
|        |           |            | [place] [address]             |                   |
|        |           |            | [phone]         |                   |
|        |           |            | [phone] (Fax)   |                   |
+--------+-----------+------------+----------------------+-------------------+
| 02/10/ | Clinical  | Cardiology |                      |                   |
|    | Support   |            |                      |                   |
+--------+-----------+------------+----------------------+-------------------+
| / | Office    | Cardiology |   Tonia Wilson,    |                   |
|    | Visit     |            | [facility]  [address]   |                   |
|        |           |            | [place]  |                   |
|        |           |            | [address]  [phone]  |                   |
|        |           |            |  [phone] (Fax)  |                   |
+--------+-----------+------------+----------------------+-------------------+
| / | Off-Site  | Nephrology |   Marzena Moser  |                   |
|    | Visit     |            | DO ETIENNE  [address] [place] [address]      |                   |
|        |           |            | [address]      |                   |
|        |           |            | [place]      |                   |
|        |           |            | [address] [phone]  |                   |
|        |           |            |  [phone] (Fax)  |                   |
+--------+-----------+------------+----------------------+-------------------+
 documented as of this encounter
 
 Visit Diagnoses
 Not on filedocumented in this encounter

## 2020-01-08 NOTE — XMS
Encounter Summary
  Created on: 2020
 
 Viviana Ricci
 External Reference #: [id number]
 : 47
 Sex: Female
 
 Demographics
 
 
+-----------------------+----------------------+
| Address               | [address]      |
|                       | [place]  [address] |
+-----------------------+----------------------+
| Home Phone            | [phone]      |
+-----------------------+----------------------+
| Preferred Language    | Unknown              |
+-----------------------+----------------------+
| Marital Status        | Single               |
+-----------------------+----------------------+
| Yazidi Affiliation | [id number]                 |
+-----------------------+----------------------+
| Race                  | Unknown              |
+-----------------------+----------------------+
| Ethnic Group          | Unknown              |
+-----------------------+----------------------+
 
 
 Author
 
 
+--------------+--------------------------------------------+
| Author       | [facility]  |
|              | and [place]                                |
+--------------+--------------------------------------------+
| Organization | [facility]  |
|              | and [place]                                |
+--------------+--------------------------------------------+
| Address      | Unknown                                    |
+--------------+--------------------------------------------+
| Phone        | Unavailable                                |
+--------------+--------------------------------------------+
 
 
 
 Support
 
 
+----------------+--------------+---------------------+-----------------+
| Name           | Relationship | Address             | Phone           |
+----------------+--------------+---------------------+-----------------+
| Ada/Ed Radhames | ECON         | [address]              | [phone] |
|                |              | [place]  |                 |
|                |              |  [address]              |                 |
+----------------+--------------+---------------------+-----------------+
 
 
 
 
 Care Team Providers
 
 
+-----------------------+------+-------------+
| Care Team Member Name | Role | Phone       |
+-----------------------+------+-------------+
 PCP  | Unavailable |
+-----------------------+------+-------------+
 
 
 
 Reason for Visit
 
 
+-----------+--------------------------------------------------------+
| Reason    | Comments                                               |
+-----------+--------------------------------------------------------+
| Follow-up | Follow Up Right Shoulder MRI done at [facility] on 3/10/2017 |
+-----------+--------------------------------------------------------+
 
 
 
 Encounter Details
 
 
+--------+---------+----------------------+----------------------+----------------------+
| Date   | Type    | Department           | Care Team            | Description          |
+--------+---------+----------------------+----------------------+----------------------+
| / | Office  |   [facility]          |   Edgar Sanders,   | Rotator cuff tear    |
| 2017   | Visit   | ORTHOPEDIC SURGERY   | MD Ervin TURNER      | arthropathy of right |
|        |         | [address] [place] [address]     | [place]      |  shoulder (Primary   |
|        |         | [place], [place]      | [address] [phone]  | Dx)                  |
|        |         | [address]           |  [phone] (Fax)  |                      |
|        |         | [phone]         |                      |                      |
+--------+---------+----------------------+----------------------+----------------------+
 
 
 
 Social History
 
 
+--------------+-------+-----------+--------+------+
| Tobacco Use  | Types | Packs/Day | Years  | Date |
|              |       |           | Used   |      |
+--------------+-------+-----------+--------+------+
| Never Smoker |       |           |        |      |
+--------------+-------+-----------+--------+------+
 
 
 
+---------------------+---+---+---+
| Smokeless Tobacco:  |   |   |   |
| Never Used          |   |   |   |
+---------------------+---+---+---+
 
 
 
+---------------------------------------------------------------+
| Comments: some second hand smoke exposure, but fairly minimal |
 
+---------------------------------------------------------------+
 
 
 
+-------------+-------------+---------+----------+
| Alcohol Use | Drinks/Week | oz/Week | Comments |
+-------------+-------------+---------+----------+
| No          |             |         |          |
+-------------+-------------+---------+----------+
 
 
 
+------------------+---------------+
| Sex Assigned at  | Date Recorded |
| Birth            |               |
+------------------+---------------+
| Not on file      |               |
+------------------+---------------+
 
 
 
+----------------+-------------+-------------+
| Job Start Date | Occupation  | Industry    |
+----------------+-------------+-------------+
| Not on file    | Not on file | Not on file |
+----------------+-------------+-------------+
 
 
 
+----------------+--------------+------------+
| Travel History | Travel Start | Travel End |
+----------------+--------------+------------+
 
 
 
+-------------------------------------+
| No recent travel history available. |
+-------------------------------------+
 documented as of this encounter
 
 Last Filed Vital Signs
 
 
+-------------------+-------------------+----------------------+----------+
| Vital Sign        | Reading           | Time Taken           | Comments |
+-------------------+-------------------+----------------------+----------+
| Blood Pressure    | -                 | -                    |          |
+-------------------+-------------------+----------------------+----------+
| Pulse             | -                 | -                    |          |
+-------------------+-------------------+----------------------+----------+
| Temperature       | -                 | -                    |          |
+-------------------+-------------------+----------------------+----------+
| Respiratory Rate  | -                 | -                    |          |
+-------------------+-------------------+----------------------+----------+
| Oxygen Saturation | -                 | -                    |          |
+-------------------+-------------------+----------------------+----------+
| Inhaled Oxygen    | -                 | -                    |          |
| Concentration     |                   |                      |          |
+-------------------+-------------------+----------------------+----------+
| Weight            | 121.1 kg (267 lb) | 2017 10:34 AM  |          |
 
|                   |                   | PDT                  |          |
+-------------------+-------------------+----------------------+----------+
| Height            | 167.6 cm (5' 6")  | 2017 10:34 AM  |          |
|                   |                   | PDT                  |          |
+-------------------+-------------------+----------------------+----------+
| Body Mass Index   | 43.09             | 2017 10:34 AM  |          |
|                   |                   | PDT                  |          |
+-------------------+-------------------+----------------------+----------+
 documented in this encounter
 
 Progress Notes
 Edgar Sanders MD - 2017  8:24 PM PDTPatient returns to go over her shoulder MRI
 She has a massive irreparable rotator cuff tear
 She has amazing shoulder function and deltoid strength
 She doesn't have a hornblower sign over drop arm sign
 My rec is to treat this conservatively - many an intermittent steroid injection from time t
o time
 The role of reverse TSA is discussed
 She is happy with the current planElectronically signed by Edgar Sanders MD at 2017
  8:26 PM PDTdocumented in this encounter
 
 Plan of Treatment
 
 
+--------+-----------+------------+----------------------+-------------------+
| Date   | Type      | Specialty  | Care Team            | Description       |
+--------+-----------+------------+----------------------+-------------------+
| / | Office    | Cardiology |   Tonia Wilson,    |                   |
|    | Visit     |            | ARNP  [address]   |                   |
|        |           |            | [facility] [place]  |                   |
|        |           |            | [address]  [phone]  |                   |
|        |           |            |  [phone] (Fax)  |                   |
+--------+-----------+------------+----------------------+-------------------+
| / | Hospital  | Radiology  |   Popeye Townsend, |                   |
2020   | Encounter |            |  MD  [address] |                   |
|        |           |            |  [facility]   |                   |
|        |           |            | [place] [address]             |                   |
|        |           |            | [phone]         |                   |
|        |           |            | [phone] (Fax)   |                   |
+--------+-----------+------------+----------------------+-------------------+
| / | Surgery   | Radiology  |   Popeye Townsend, | CV EP PPM SYSTEM  |
|    |           |            |  MD  [address] | IMPLANT           |
|        |           |            |  St. [place],   |                   |
|        |           |            | [place] [address]             |                   |
|        |           |            | [phone]         |                   |
|        |           |            | [phone] (Fax)   |                   |
+--------+-----------+------------+----------------------+-------------------+
| 02/10/ | Clinical  | Cardiology |                      |                   |
|    | Support   |            |                      |                   |
+--------+-----------+------------+----------------------+-------------------+
| / | Office    | Cardiology |   Tonia Wilson,    |                   |
|    | Visit     |            | ARNJESSICA  [address]   |                   |
|        |           |            | [place]  |                   |
|        |           |            | [address]  [phone]  |                   |
|        |           |            |  [phone] (Fax)  |                   |
+--------+-----------+------------+----------------------+-------------------+
| / | Off-Site  | Nephrology |   Marzena Moser  |                   |
|    | Visit     |            | DO ETIENNE  [address]      |                   |
|        |           |            | [address]      |                   |
|        |           |            | [place]      |                   |
 
|        |           |            | [address]  [phone]  |                   |
|        |           |            |  [phone] (Fax)  |                   |
+--------+-----------+------------+----------------------+-------------------+
 documented as of this encounter
 
 Visit Diagnoses
 
 
+--------------------------------------------------------------------------------------+
| Diagnosis                                                                            |
+--------------------------------------------------------------------------------------+
|   Rotator cuff tear arthropathy of right shoulder - Primary  Traumatic arthropathy,  |
| shoulder region                                                                      |
+--------------------------------------------------------------------------------------+
 documented in this encounter
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
## 2020-01-08 NOTE — XMS
Encounter Summary
  Created on: 2020
 
 Viviana Ricci
 External Reference #: [id number]
 : 47
 Sex: Female
 
 Demographics
 
 
+-----------------------+----------------------+
| Address               | [address]      |
|                       | [place]  [address] |
+-----------------------+----------------------+
| Home Phone            | [phone]      |
+-----------------------+----------------------+
| Preferred Language    | Unknown              |
+-----------------------+----------------------+
| Marital Status        | Single               |
+-----------------------+----------------------+
| Oriental orthodox Affiliation | [id number]                 |
+-----------------------+----------------------+
| Race                  | Unknown              |
+-----------------------+----------------------+
| Ethnic Group          | Unknown              |
+-----------------------+----------------------+
 
 
 Author
 
 
+--------------+--------------------------------------------+
| Author       | [facility]  |
|              | and [place]                                |
+--------------+--------------------------------------------+
| Organization | [facility]  |
|              | and [place]                                |
+--------------+--------------------------------------------+
| Address      | Unknown                                    |
+--------------+--------------------------------------------+
| Phone        | Unavailable                                |
+--------------+--------------------------------------------+
 
 
 
 Support
 
 
+----------------+--------------+---------------------+-----------------+
| Name           | Relationship | Address             | Phone           |
+----------------+--------------+---------------------+-----------------+
| Ada/Ed Radhames | ECON         | [address]              | [phone] |
|                |              | [place]  |                 |
|                |              |  [id number]              |                 |
+----------------+--------------+---------------------+-----------------+
 
 
 
 
 Care Team Providers
 
 
+-----------------------+------+-------------+
| Care Team Member Name | Role | Phone       |
+-----------------------+------+-------------+
 PCP  | Unavailable |
+-----------------------+------+-------------+
 
 
 
 Encounter Details
 
 
+--------+-------------+---------------------+---------------------+----------------------+
| Date   | Type        | Department          | Care Team           | Description          |
+--------+-------------+---------------------+---------------------+----------------------+
| 11/15/ | Orders Only |   [facility]         |   Juju Glass,  | Type 2 diabetes      |
| 2017   |             | NEPHROLOGY  [address]   | RN                  | mellitus with        |
|        |             | [address]   |                     | complication, with   |
|        |             | [place], [place]     |                     | long-term current    |
|        |             | [address]          |                     | use of insulin (HCC) |
|        |             | [phone]        |                     |  (Primary Dx)        |
+--------+-------------+---------------------+---------------------+----------------------+
 
 
 
 Social History
 
 
+--------------+-------+-----------+--------+------+
| Tobacco Use  | Types | Packs/Day | Years  | Date |
|              |       |           | Used   |      |
+--------------+-------+-----------+--------+------+
| Never Smoker |       |           |        |      |
+--------------+-------+-----------+--------+------+
 
 
 
+---------------------+---+---+---+
| Smokeless Tobacco:  |   |   |   |
| Never Used          |   |   |   |
+---------------------+---+---+---+
 
 
 
+---------------------------------------------------------------+
| Comments: some second hand smoke exposure, but fairly minimal |
+---------------------------------------------------------------+
 
 
 
+-------------+-------------+---------+----------+
| Alcohol Use | Drinks/Week | oz/Week | Comments |
+-------------+-------------+---------+----------+
| No          |             |         |          |
+-------------+-------------+---------+----------+
 
 
 
 
+------------------+---------------+
| Sex Assigned at  | Date Recorded |
| Birth            |               |
+------------------+---------------+
| Not on file      |               |
+------------------+---------------+
 
 
 
+----------------+-------------+-------------+
| Job Start Date | Occupation  | Industry    |
+----------------+-------------+-------------+
| Not on file    | Not on file | Not on file |
+----------------+-------------+-------------+
 
 
 
+----------------+--------------+------------+
| Travel History | Travel Start | Travel End |
+----------------+--------------+------------+
 
 
 
+-------------------------------------+
| No recent travel history available. |
+-------------------------------------+
 documented as of this encounter
 
 Plan of Treatment
 
 
+--------+-----------+------------+----------------------+-------------------+
| Date   | Type      | Specialty  | Care Team            | Description       |
+--------+-----------+------------+----------------------+-------------------+
| / | Office    | Cardiology |   Tonia Wilson,    |                   |
|    | Visit     |            | [address]   |                   |
|        |           |            | [place]  |                   |
|        |           |            | [address]  [phone]  |                   |
|        |           |            |  [phone] (Fax)  |                   |
+--------+-----------+------------+----------------------+-------------------+
| / | Hospital  | Radiology  |   Popeye Townsend, |                   |
2020   | Encounter |            |  MD  [address] |                   |
|        |           |            |  St.  [place],   |                   |
|        |           |            | [place] [address]             |                   |
|        |           |            | [phone]         |                   |
|        |           |            | [phone] (Fax)   |                   |
+--------+-----------+------------+----------------------+-------------------+
| / | Surgery   | Radiology  |   Popeye Townsend, | CV EP PPM SYSTEM  |
|    |           |            |  MD  [address] | IMPLANT           |
|        |           |            |  St.  [place],   |                   |
|        |           |            | [place] [address]             |                   |
|        |           |            | [phone]         |                   |
|        |           |            | [phone] (Fax)   |                   |
+--------+-----------+------------+----------------------+-------------------+
| 02/10/ | Clinical  | Cardiology |                      |                   |
2020   | Support   |            |                      |                   |
+--------+-----------+------------+----------------------+-------------------+
| / | Office    | Cardiology |   Tonia Wilson,    |                   |
|    | Visit     |            | ARN  [address]   |                   |
 
|        |           |            |   [place]  |                   |
|        |           |            | [address]  [phone]  |                   |
|        |           |            |  [phone] (Fax)  |                   |
+--------+-----------+------------+----------------------+-------------------+
| / | Off-Site  | Nephrology |   Marzena Moser  |                   |
|    | Visit     |            | DO ETIENNE  [address]      |                   |
|        |           |            | [address]      |                   |
|        |           |            | [place]      |                   |
|        |           |            | [address]  [phone]  |                   |
|        |           |            |  [phone] (Fax)  |                   |
+--------+-----------+------------+----------------------+-------------------+
 documented as of this encounter
 
 Visit Diagnoses
 
 
+--------------------------------------------------------------------------------------+
| Diagnosis                                                                            |
+--------------------------------------------------------------------------------------+
|   Type 2 diabetes mellitus with complication, with long-term current use of insulin  |
| (HCC) - Primary                                                                      |
+--------------------------------------------------------------------------------------+
 documented in this encounter

## 2020-01-08 NOTE — XMS
Encounter Summary
  Created on: 2020
 
 Viviana Ricci
 External Reference #: [id number]
 : 47
 Sex: Female
 
 Demographics
 
 
+-----------------------+----------------------+
| Address               | [address]      |
|                       | [place]  [address] |
+-----------------------+----------------------+
| Home Phone            | [phone]      |
+-----------------------+----------------------+
| Preferred Language    | Unknown              |
+-----------------------+----------------------+
| Marital Status        | Single               |
+-----------------------+----------------------+
| [employer] Affiliation | [id number]                 |
+-----------------------+----------------------+
| Race                  | Unknown              |
+-----------------------+----------------------+
| Ethnic Group          | Unknown              |
+-----------------------+----------------------+
 
 
 Author
 
 
+--------------+--------------------------------------------+
| Author       | [facility]  |
|              | and [facility]                                |
+--------------+--------------------------------------------+
| Organization | [facility]  |
|              | and [facility]                                |
+--------------+--------------------------------------------+
| Address      | Unknown                                    |
+--------------+--------------------------------------------+
| Phone        | Unavailable                                |
+--------------+--------------------------------------------+
 
 
 
 Support
 
 
+----------------+--------------+---------------------+-----------------+
| Name           | Relationship | Address             | Phone           |
+----------------+--------------+---------------------+-----------------+
| Ada/Ed Radhames | ECON         | [place]              | [phone] |
|                |              | [place], [place]  |                 |
|                |              |  [address]              |                 |
+----------------+--------------+---------------------+-----------------+
 
 
 
 
 Care Team Providers
 
 
+-----------------------+------+-------------+
| Care Team Member Name | Role | Phone       |
+-----------------------+------+-------------+
 PCP  | Unavailable |
+-----------------------+------+-------------+
 
 
 
 Encounter Details
 
 
+--------+-----------+----------------------+----------------+-------------+
| Date   | Type      | Department           | Care Team      | Description |
+--------+-----------+----------------------+----------------+-------------+
| / | [facility]  |   [facility] |   Lucretia,  |             |
|    | Encounter |  MED CTR GENERIC OP  | Nena GUSMAN MD  |             |
|        |           | CONV DEPT  [address]     |                |             |
|        |           | [place], |                |             |
|        |           |  [place] [address]       |                |             |
|        |           | [phone]         |                |             |
+--------+-----------+----------------------+----------------+-------------+
 
 
 
 Social History
 
 
+--------------+-------+-----------+--------+------+
| Tobacco Use  | Types | Packs/Day | Years  | Date |
|              |       |           | Used   |      |
+--------------+-------+-----------+--------+------+
| Never Smoker |       |           |        |      |
+--------------+-------+-----------+--------+------+
 
 
 
+---------------------+---+---+---+
| Smokeless Tobacco:  |   |   |   |
| Never Used          |   |   |   |
+---------------------+---+---+---+
 
 
 
+---------------------------------------------------------------+
| Comments: some second hand smoke exposure, but fairly minimal |
+---------------------------------------------------------------+
 
 
 
+-------------+-------------+---------+----------+
| Alcohol Use | Drinks/Week | oz/Week | Comments |
+-------------+-------------+---------+----------+
| No          |             |         |          |
+-------------+-------------+---------+----------+
 
 
 
 
+------------------+---------------+
| Sex Assigned at  | Date Recorded |
| Birth            |               |
+------------------+---------------+
| Not on file      |               |
+------------------+---------------+
 
 
 
+----------------+-------------+-------------+
| Job Start Date | Occupation  | Industry    |
+----------------+-------------+-------------+
| Not on file    | Not on file | Not on file |
+----------------+-------------+-------------+
 
 
 
+----------------+--------------+------------+
| Travel History | Travel Start | Travel End |
+----------------+--------------+------------+
 
 
 
+-------------------------------------+
| No recent travel history available. |
+-------------------------------------+
 documented as of this encounter
 
 Medications at Time of Discharge
 
 
+----------------------+----------------------+-----------+---------+----------+-----------+
| Medication           | Sig                  | Dispensed | Refills | Start    | End Date  |
|                      |                      |           |         | Date     |           |
+----------------------+----------------------+-----------+---------+----------+-----------+
|   glucose blood VI   | Check blood sugar    |   100     | 12      | 20 |           |
| test strips (ONE     | before each meal and | each      |         | 12       |           |
| TOUCH ULTRA TEST)    |  as directed         |           |         |          |           |
| stripIndications:    |                      |           |         |          |           |
| Unspecified          |                      |           |         |          |           |
| hypertensive kidney  |                      |           |         |          |           |
| disease with chronic |                      |           |         |          |           |
|  kidney disease      |                      |           |         |          |           |
| stage I through      |                      |           |         |          |           |
| stage IV, or         |                      |           |         |          |           |
| unspecified(403.90), |                      |           |         |          |           |
|  Complications of    |                      |           |         |          |           |
| transplanted kidney, |                      |           |         |          |           |
|  Diabetes mellitus   |                      |           |         |          |           |
| type II,             |                      |           |         |          |           |
| uncontrolled (HCC),  |                      |           |         |          |           |
| Hyperlipidemia       |                      |           |         |          |           |
+----------------------+----------------------+-----------+---------+----------+-----------+
|   albuterol (PROAIR  | Inhale 2 puffs into  |   1       | 2       | 20 |  |
| HFA) 90 mcg/puff     | the lungs every 6    | Inhaler   |         | 13       | 4         |
| inhalerIndications:  | hours as needed for  |           |         |          |           |
| Cough                | Wheezing.            |           |         |          |           |
+----------------------+----------------------+-----------+---------+----------+-----------+
|   allopurinol        | Take 100 mg by mouth |           | 0       | 20 |  |
 
| (ZYLOPRIM) 100 mg    |  Daily.              |           |         | 12       | 3         |
| tablet               |                      |           |         |          |           |
+----------------------+----------------------+-----------+---------+----------+-----------+
|   aspirin 81 MG EC   | Take 81 mg by mouth  |           | 0       | 20 |  |
| tablet               | Daily.               |           |         | 12       | 5         |
+----------------------+----------------------+-----------+---------+----------+-----------+
|   Cholecalciferol    | Take 5,000 Units by  |           | 0       | 20 |  |
| (VITAMIN D3) 5000    | mouth Once a week.   |           |         | 12       | 4         |
| UNITS CAPS           |                      |           |         |          |           |
+----------------------+----------------------+-----------+---------+----------+-----------+
|   cinacalcet         | Take 1 tablet by     |   30      | 12      | 10/29/20 |  |
| (SENSIPAR) 30 mg     | mouth Daily.         | tablet    |         | 12       | 3         |
| tablet               |                      |           |         |          |           |
+----------------------+----------------------+-----------+---------+----------+-----------+
|   fludrocortisone    | Take 1 tablet by     |   30      | 11      | 10/01/20 |  |
| (FLORINEF) 0.1 mg    | mouth Every other    | tablet    |         | 12       | 4         |
| tablet               | day.                 |           |         |          |           |
+----------------------+----------------------+-----------+---------+----------+-----------+
|   furosemide (LASIX) | Take two tablets by  |   60      | 5       | 20 | 07/15/201 |
|  40 mg tablet        | mouth daily.         | tablet    |         | 12       | 3         |
+----------------------+----------------------+-----------+---------+----------+-----------+
|   insulin glargine   | Inject 20 Units      |           | 0       | 20 |  |
| (LANTUS) 100         | under the skin every |           |         | 12       | 3         |
| units/mL             |  morning.            |           |         |          |           |
| injectionIndications |                      |           |         |          |           |
| : Unspecified        |                      |           |         |          |           |
| hypertensive kidney  |                      |           |         |          |           |
| disease with chronic |                      |           |         |          |           |
|  kidney disease      |                      |           |         |          |           |
| stage I through      |                      |           |         |          |           |
| stage IV, or         |                      |           |         |          |           |
| unspecified(403.90), |                      |           |         |          |           |
|  Complications of    |                      |           |         |          |           |
| transplanted kidney, |                      |           |         |          |           |
|  Diabetes mellitus   |                      |           |         |          |           |
| type II,             |                      |           |         |          |           |
| uncontrolled (HCC),  |                      |           |         |          |           |
| Hyperlipidemia       |                      |           |         |          |           |
+----------------------+----------------------+-----------+---------+----------+-----------+
|   insulin lispro     | Inject               |           | 0       | 20 |  |
| (HUMALOG) 100        | subcutaneously       |           |         | 12       | 3         |
| units/mL injection   | before meals         |           |         |          |           |
|                      | according to sliding |           |         |          |           |
|                      |  scale               |           |         |          |           |
+----------------------+----------------------+-----------+---------+----------+-----------+
|   Insulin            | Use to inject        |           | 0       | 20 |  |
| Syringe-Needle U-100 | insulin              |           |         | 12       | 3         |
|  (BD INSULIN SYRINGE | subcutaneously       |           |         |          |           |
|  ULTRAFINE) 31G X    | before meals or as   |           |         |          |           |
| " 0.5 ML MISC    | directed             |           |         |          |           |
+----------------------+----------------------+-----------+---------+----------+-----------+
|   levothyroxine      | Take 1 tablet by     |   30      | 11      | 10/01/20 |  |
| (LEVOTHROID) 50 mcg  | mouth Daily.         | tablet    |         | 12       | 3         |
| tablet               |                      |           |         |          |           |
+----------------------+----------------------+-----------+---------+----------+-----------+
|   lisinopril         | Take 1 tablet by     |   90      | 3       | 20 |  |
| (PRINIVIL,ZESTRIL)   | mouth Daily.         | tablet    |         | 12       | 3         |
| 30 MG tablet         |                      |           |         |          |           |
+----------------------+----------------------+-----------+---------+----------+-----------+
|   loperamide         | Take 2 mg by mouth   |           | 0       | 20 |  |
 
| (ANTI-DIARRHEAL) 2   | Daily as needed.     |           |         | 12       | 4         |
| mg capsule           |                      |           |         |          |           |
+----------------------+----------------------+-----------+---------+----------+-----------+
|   magnesium oxide    | Take 400 mg by mouth |           | 0       | 20 |  |
| (MAG-OX) 400 mg      |  2 times daily.      |           |         | 12       | 3         |
| tablet               |                      |           |         |          |           |
+----------------------+----------------------+-----------+---------+----------+-----------+
|   metoprolol         | Take 25 mg by mouth  |           | 0       | 20 |  |
| tartrate (LOPRESSOR) | 2 times daily.       |           |         | 12       | 3         |
|  25 mg tablet        |                      |           |         |          |           |
+----------------------+----------------------+-----------+---------+----------+-----------+
|   mycophenolate      | Take 250 mg by mouth |           | 0       | 20 | 10/14/201 |
| (CELLCEPT) 250 mg    |  3 times daily.      |           |         | 11       | 5         |
| capsule              |                      |           |         |          |           |
+----------------------+----------------------+-----------+---------+----------+-----------+
|   niacin (NIASPAN)   | Not taking           |           | 0       | 20 |  |
| 500 mg CR tablet     |                      |           |         | 11       | 3         |
+----------------------+----------------------+-----------+---------+----------+-----------+
|   omeprazole         | Take one capsule by  |           | 0       | 20 |  |
| (PRILOSEC) 20 mg     | mouth once daily on  |           |         | 12       | 3         |
| capsule              | an empty stomach     |           |         |          |           |
+----------------------+----------------------+-----------+---------+----------+-----------+
|   predniSONE         | Take 1 tablet by     |   90      | 3       | 20 |  |
| (DELTASONE) 5 mg     | mouth Daily.         | tablet    |         | 12       | 4         |
| tablet               |                      |           |         |          |           |
+----------------------+----------------------+-----------+---------+----------+-----------+
|   Prenatal           | Take 0.8 mg by mouth |           | 0       | 20 |  |
| Multivit-Min-Fe-FA   |  Daily.              |           |         | 12       | 3         |
| (PRENATAL VITAMINS)  |                      |           |         |          |           |
| 0.8 MG TABS          |                      |           |         |          |           |
+----------------------+----------------------+-----------+---------+----------+-----------+
|   rosuvastatin       | Take 20 mg by mouth  |           | 0       |          |  |
| (CRESTOR) 40 MG      | nightly.             |           |         |          | 3         |
| tabletIndications:   |                      |           |         |          |           |
| Unspecified          |                      |           |         |          |           |
| hypertensive kidney  |                      |           |         |          |           |
| disease with chronic |                      |           |         |          |           |
|  kidney disease      |                      |           |         |          |           |
| stage I through      |                      |           |         |          |           |
| stage IV, or         |                      |           |         |          |           |
| unspecified(403.90), |                      |           |         |          |           |
|  Complications of    |                      |           |         |          |           |
| transplanted kidney, |                      |           |         |          |           |
|  Diabetes mellitus   |                      |           |         |          |           |
| type II,             |                      |           |         |          |           |
| uncontrolled (HCC),  |                      |           |         |          |           |
| Hyperlipidemia       |                      |           |         |          |           |
+----------------------+----------------------+-----------+---------+----------+-----------+
|   tacrolimus         | TAD.                 |           | 0       | 20 | 07/15/201 |
| (PROGRAF) 0.5 mg     |                      |           |         | 12       | 4         |
| capsuleIndications:  |                      |           |         |          |           |
| Unspecified          |                      |           |         |          |           |
| hypertensive kidney  |                      |           |         |          |           |
| disease with chronic |                      |           |         |          |           |
|  kidney disease      |                      |           |         |          |           |
| stage I through      |                      |           |         |          |           |
| stage IV, or         |                      |           |         |          |           |
| unspecified(403.90), |                      |           |         |          |           |
|  Complications of    |                      |           |         |          |           |
| transplanted kidney, |                      |           |         |          |           |
 
|  Diabetes mellitus   |                      |           |         |          |           |
| type II,             |                      |           |         |          |           |
| uncontrolled (HCC),  |                      |           |         |          |           |
| Hyperlipidemia       |                      |           |         |          |           |
+----------------------+----------------------+-----------+---------+----------+-----------+
|   tacrolimus         | Take 1 mg by mouth 2 |           | 0       | 20 |  |
| (PROGRAF) 1 mg       |  times daily.        |           |         | 12       | 3         |
| capsuleIndications:  |                      |           |         |          |           |
| Unspecified          |                      |           |         |          |           |
| hypertensive kidney  |                      |           |         |          |           |
| disease with chronic |                      |           |         |          |           |
|  kidney disease      |                      |           |         |          |           |
| stage I through      |                      |           |         |          |           |
| stage IV, or         |                      |           |         |          |           |
| unspecified(403.90), |                      |           |         |          |           |
|  Complications of    |                      |           |         |          |           |
| transplanted kidney, |                      |           |         |          |           |
|  Diabetes mellitus   |                      |           |         |          |           |
| type II,             |                      |           |         |          |           |
| uncontrolled (HCC),  |                      |           |         |          |           |
| Hyperlipidemia       |                      |           |         |          |           |
+----------------------+----------------------+-----------+---------+----------+-----------+
|   valsartan (DIOVAN) | Take 1 tablet by     |   30      | 11      | 20 |  |
|  160 mg tablet       | mouth Daily.         | tablet    |         | 13       | 4         |
+----------------------+----------------------+-----------+---------+----------+-----------+
 documented as of this encounter
 
 Plan of Treatment
 
 
+--------+-----------+------------+----------------------+-------------------+
| Date   | Type      | Specialty  | Care Team            | Description       |
+--------+-----------+------------+----------------------+-------------------+
| / | Office    | Cardiology |   Tonia Wilson,    |                   |
|    | Visit     |            | ARNP  [address]   |                   |
|        |           |            | St  [facility] [place]  |                   |
|        |           |            | [address]  [phone]  |                   |
|        |           |            |  [phone] (Fax)  |                   |
+--------+-----------+------------+----------------------+-------------------+
| / | Hospital  | Radiology  |   Popeye Townsend, |                   |
|    | Encounter |            |  MD  [address] |                   |
|        |           |            |  StNikkie  [place],   |                   |
|        |           |            | [place] [address]             |                   |
|        |           |            | [phone]         |                   |
|        |           |            | [phone] (Fax)   |                   |
+--------+-----------+------------+----------------------+-------------------+
| / | Surgery   | Radiology  |   Popeye Townsend, | CV EP PPM SYSTEM  |
|    |           |            |  MD  [address] | IMPLANT           |
|        |           |            |  St.  [place],   |                   |
|        |           |            | [place] [address]             |                   |
|        |           |            | [phone]         |                   |
|        |           |            | [phone] (Fax)   |                   |
+--------+-----------+------------+----------------------+-------------------+
| 02/10/ | Clinical  | Cardiology |                      |                   |
|    | Support   |            |                      |                   |
+--------+-----------+------------+----------------------+-------------------+
| / | Office    | Cardiology |   RakeshTonia andre,    |                   |
|    | Visit     |            | BELKIS  [address]   |                   |
|        |           |            | [place]  |                   |
|        |           |            | [address]  [phone]  |                   |
 
|        |           |            |  [phone] (Fax)  |                   |
+--------+-----------+------------+----------------------+-------------------+
| / | Off-Site  | Nephrology |   Marzena Moser  |                   |
|    | Visit     |            | DO ETIENNE  [address] [place] [address]      |                   |
|        |           |            | [address]      |                   |
|        |           |            | [place]      |                   |
|        |           |            | [address] [phone]  |                   |
|        |           |            |  [phone] (Fax)  |                   |
+--------+-----------+------------+----------------------+-------------------+
 documented as of this encounter
 
 Visit Diagnoses
 Not on filedocumented in this encounter

## 2020-01-08 NOTE — XMS
Encounter Summary
  Created on: 2020
 
 Viviana Ricci
 External Reference #: [id number]
 : 47
 Sex: Female
 
 Demographics
 
 
+-----------------------+----------------------+
| Address               | [address]      |
|                       | [place]  [address] |
+-----------------------+----------------------+
| Home Phone            | [phone]      |
+-----------------------+----------------------+
| Preferred Language    | Unknown              |
+-----------------------+----------------------+
| Marital Status        | Single               |
+-----------------------+----------------------+
| Mormonism Affiliation | [id number]                 |
+-----------------------+----------------------+
| Race                  | Unknown              |
+-----------------------+----------------------+
| Ethnic Group          | Unknown              |
+-----------------------+----------------------+
 
 
 Author
 
 
+--------------+--------------------------------------------+
| Author       | [facility]  |
|              | and [place]                                |
+--------------+--------------------------------------------+
| Organization | [facility]  |
|              | and [place]                                |
+--------------+--------------------------------------------+
| Address      | Unknown                                    |
+--------------+--------------------------------------------+
| Phone        | Unavailable                                |
+--------------+--------------------------------------------+
 
 
 
 Support
 
 
+----------------+--------------+---------------------+-----------------+
| Name           | Relationship | Address             | Phone           |
+----------------+--------------+---------------------+-----------------+
| Ada/Ed Radhames | ECON         | [address]              | [phone] |
|                |              | [place]  |                 |
|                |              |  [address]              |                 |
+----------------+--------------+---------------------+-----------------+
 
 
 
 
 Care Team Providers
 
 
+-----------------------+------+-------------+
| Care Team Member Name | Role | Phone       |
+-----------------------+------+-------------+
 PCP  | Unavailable |
+-----------------------+------+-------------+
 
 
 
 Encounter Details
 
 
+--------+-----------+----------------------+----------------------+-------------+
| Date   | Type      | Department           | Care Team            | Description |
+--------+-----------+----------------------+----------------------+-------------+
| / | [facility]  |   [facility] |   Marzena Moser  |             |
|    | Encounter |  MED CTR XRAY  [address] | M, DO  [address]      |             |
|        |           |  [place]       | [address]      |             |
|        |           | [place] [address] | [place]      |             |
|        |           |   [phone]       | [address] [phone]  |             |
|        |           |                      |  [phone] (Fax)  |             |
+--------+-----------+----------------------+----------------------+-------------+
 
 
 
 Social History
 
 
+----------------+-------+-----------+--------+------+
| Tobacco Use    | Types | Packs/Day | Years  | Date |
|                |       |           | Used   |      |
+----------------+-------+-----------+--------+------+
| Never Assessed |       |           |        |      |
+----------------+-------+-----------+--------+------+
 
 
 
+------------------+---------------+
| Sex Assigned at  | Date Recorded |
| Birth            |               |
+------------------+---------------+
| Not on file      |               |
+------------------+---------------+
 
 
 
+----------------+-------------+-------------+
| Job Start Date | Occupation  | Industry    |
+----------------+-------------+-------------+
| Not on file    | Not on file | Not on file |
+----------------+-------------+-------------+
 
 
 
+----------------+--------------+------------+
| Travel History | Travel Start | Travel End |
+----------------+--------------+------------+
 
 
 
 
+-------------------------------------+
| No recent travel history available. |
+-------------------------------------+
 documented as of this encounter
 
 Plan of Treatment
 
 
+--------+-----------+------------+----------------------+-------------------+
| Date   | Type      | Specialty  | Care Team            | Description       |
+--------+-----------+------------+----------------------+-------------------+
| / | Office    | Cardiology |   Tonia Wilson,    |                   |
|    | Visit     |            | ARNJESSICA  [address]   |                   |
|        |           |            | St  [place], [place]  |                   |
|        |           |            | [address]  [phone]  |                   |
|        |           |            |  [phone] (Fax)  |                   |
+--------+-----------+------------+----------------------+-------------------+
| / | Hospital  | Radiology  |   Popeye Townsend, |                   |
|    | Encounter |            |  MD  [address] |                   |
|        |           |            |  St. [place],   |                   |
|        |           |            | [place] [address]             |                   |
|        |           |            | [phone]         |                   |
|        |           |            | [phone] (Fax)   |                   |
+--------+-----------+------------+----------------------+-------------------+
| / | Surgery   | Radiology  |   Popeye Townsend, | CV EP PPM SYSTEM  |
|    |           |            |  MD  [address] | IMPLANT           |
|        |           |            |  [place],   |                   |
|        |           |            | [place] [address]             |                   |
|        |           |            | [phone]         |                   |
|        |           |            | [phone] (Fax)   |                   |
+--------+-----------+------------+----------------------+-------------------+
| 02/10/ | Clinical  | Cardiology |                      |                   |
|    | Support   |            |                      |                   |
+--------+-----------+------------+----------------------+-------------------+
| / | Office    | Cardiology |   Tonia Wilson,    |                   |
|    | Visit     |            | BELKIS  [address]   |                   |
|        |           |            | [place]  |                   |
|        |           |            | [address]  [phone]  |                   |
|        |           |            |  [phone] (Fax)  |                   |
+--------+-----------+------------+----------------------+-------------------+
| / | Off-Site  | Nephrology |   Marzena Moser  |                   |
|    | Visit     |            | DO ETIENNE  [address] [place] [address]      |                   |
|        |           |            | [address]      |                   |
|        |           |            | [place]      |                   |
|        |           |            | [address] [phone]  |                   |
|        |           |            |  [phone] (Fax)  |                   |
+--------+-----------+------------+----------------------+-------------------+
 documented as of this encounter
 
 Visit Diagnoses
 Not on filedocumented in this encounter

## 2020-01-08 NOTE — XMS
Encounter Summary
  Created on: 2020
 
 Viviana Ricci
 External Reference #: [id number]
 : 47
 Sex: Female
 
 Demographics
 
 
+-----------------------+----------------------+
| Address               | [address]      |
|                       | [place]  [address] |
+-----------------------+----------------------+
| Home Phone            | [phone]      |
+-----------------------+----------------------+
| Preferred Language    | Unknown              |
+-----------------------+----------------------+
| Marital Status        | Single               |
+-----------------------+----------------------+
| Jewish Affiliation | [id number]                 |
+-----------------------+----------------------+
| Race                  | Unknown              |
+-----------------------+----------------------+
| Ethnic Group          | Unknown              |
+-----------------------+----------------------+
 
 
 Author
 
 
+--------------+--------------------------------------------+
| Author       | [facility]  |
|              | and [place]                                |
+--------------+--------------------------------------------+
| Organization | [facility]  |
|              | and [place]                                |
+--------------+--------------------------------------------+
| Address      | Unknown                                    |
+--------------+--------------------------------------------+
| Phone        | Unavailable                                |
+--------------+--------------------------------------------+
 
 
 
 Support
 
 
+----------------+--------------+---------------------+-----------------+
| Name           | Relationship | Address             | Phone           |
+----------------+--------------+---------------------+-----------------+
| Ada/Ed Radhames | ECON         | [address]              | [phone] |
|                |              | [place]  |                 |
|                |              |  [address]              |                 |
+----------------+--------------+---------------------+-----------------+
 
 
 
 
 Care Team Providers
 
 
+------------------------+------+-----------------+
| Care Team Member Name  | Role | Phone           |
+------------------------+------+-----------------+
| Marzena Moser DO | PCP  | [phone] |
+------------------------+------+-----------------+
 
 
 
 Reason for Referral
 Pulmonary Rehab (Routine)
 
+--------+--------------+---------------+--------------+--------------+---------------+
| Status | Reason       | Specialty     | Diagnoses /  | Referred By  | Referred To   |
|        |              |               | Procedures   | Contact      | Contact       |
+--------+--------------+---------------+--------------+--------------+---------------+
| Closed |   Specialty  | Pulmonary     |   Diagnoses  |   [place], |   [facility] |
|        | Services     | Disease /     |  Pulmonary   |  MD Popeye  |               |
|        | Required     | Cardiac       | hypertension |  [address]    | Rehabilitatio |
|        |              | Rehabilitatio |  ([facility])       | [address]   | n  [address]      |
|        |              | n             | Shortness of | [place], [address]  [place] |
|        |              |               |  breath      |  [place] [address]    |  [place]    |
|        |              |               | Procedures   | Phone:       | [address]    |
|        |              |               | PULM REHAB   | [phone] | Phone:        |
|        |              |               |              |   Fax:       | [phone]  |
|        |              |               |              | [phone] |  Fax:         |
|        |              |               |              |              | [phone]  |
+--------+--------------+---------------+--------------+--------------+---------------+
 
 
 
 
 Encounter Details
 
 
+--------+-------------+----------------------+----------------------+---------------------+
| Date   | Type        | Department           | Care Team            | Description         |
+--------+-------------+----------------------+----------------------+---------------------+
| / | Orders Only |   [facility] [place]          |   Popeye Townsend, | Pulmonary           |
| 2019   |             | CARDIOLOGY  [address]    |  MD  [address] | hypertension (HCC)  |
|        |             | [address]  [place], |  [address].  [place],   | (Primary Dx);       |
|        |             |  [place] [address]       | [place] [address]             | Shortness of breath |
|        |             | [phone]         | [phone]         |                     |
|        |             |                      | [phone] (Fax)   |                     |
+--------+-------------+----------------------+----------------------+---------------------+
 
 
 
 Social History
 
 
+--------------+-------+-----------+--------+------+
| Tobacco Use  | Types | Packs/Day | Years  | Date |
|              |       |           | Used   |      |
+--------------+-------+-----------+--------+------+
| Never Smoker |       |           |        |      |
+--------------+-------+-----------+--------+------+
 
 
 
 
+---------------------+---+---+---+
| Smokeless Tobacco:  |   |   |   |
| Never Used          |   |   |   |
+---------------------+---+---+---+
 
 
 
+---------------------------------------------------------------+
| Comments: some second hand smoke exposure, but fairly minimal |
+---------------------------------------------------------------+
 
 
 
+-------------+-------------+---------+----------+
| Alcohol Use | Drinks/Week | oz/Week | Comments |
+-------------+-------------+---------+----------+
| No          |             |         |          |
+-------------+-------------+---------+----------+
 
 
 
+------------------+---------------+
| Sex Assigned at  | Date Recorded |
| Birth            |               |
+------------------+---------------+
| Not on file      |               |
+------------------+---------------+
 
 
 
+----------------+-------------+-------------+
| Job Start Date | Occupation  | Industry    |
+----------------+-------------+-------------+
| Not on file    | Not on file | Not on file |
+----------------+-------------+-------------+
 
 
 
+----------------+--------------+------------+
| Travel History | Travel Start | Travel End |
+----------------+--------------+------------+
 
 
 
+-------------------------------------+
| No recent travel history available. |
+-------------------------------------+
 documented as of this encounter
 
 Plan of Treatment
 
 
+--------+-----------+------------+----------------------+-------------------+
| Date   | Type      | Specialty  | Care Team            | Description       |
+--------+-----------+------------+----------------------+-------------------+
| / | Office    | Cardiology |   Tonia Wilson,    |                   |
|    | Visit     |            | ARNP  [address]   |                   |
 
|        |           |            | St  [place]  |                   |
|        |           |            | [address]  [phone]  |                   |
|        |           |            |  [phone] (Fax)  |                   |
+--------+-----------+------------+----------------------+-------------------+
| / | Hospital  | Radiology  |   Popeye Townsend, |                   |
2020   | Encounter |            |  MD  [address] |                   |
|        |           |            |  [place]  [place],   |                   |
|        |           |            | [place] [address]             |                   |
|        |           |            | [phone]         |                   |
|        |           |            | [phone] (Fax)   |                   |
+--------+-----------+------------+----------------------+-------------------+
| / | Surgery   | Radiology  |   Popeye Townsend, | CV EP PPM SYSTEM  |
|    |           |            |  MD  [address] | IMPLANT           |
|        |           |            |  [place],   |                   |
|        |           |            | [place] [address]             |                   |
|        |           |            | [phone]         |                   |
|        |           |            | [phone] (Fax)   |                   |
+--------+-----------+------------+----------------------+-------------------+
| 02/10/ | Clinical  | Cardiology |                      |                   |
|    | Support   |            |                      |                   |
+--------+-----------+------------+----------------------+-------------------+
| / | Office    | Cardiology |   Tonia Wilson,    |                   |
|    | Visit     |            | [facility]  [address]   |                   |
|        |           |            | St  [place]  |                   |
|        |           |            | [address]  [phone]  |                   |
|        |           |            |  [phone] (Fax)  |                   |
+--------+-----------+------------+----------------------+-------------------+
| / | Off-Site  | Nephrology |   Marzena Moser  |                   |
|    | Visit     |            | DO ETIENNE  [address]      |                   |
|        |           |            | [address]      |                   |
|        |           |            | [place], [place]      |                   |
|        |           |            | [address]  [phone]  |                   |
|        |           |            |  [phone] (Fax)  |                   |
+--------+-----------+------------+----------------------+-------------------+
 
 
 
+----------------------+-------------+--------+----------------------+---------------------+
| Name                 | Type        | Priori | Associated Diagnoses | Order Schedule      |
|                      |             | ty     |                      |                     |
+----------------------+-------------+--------+----------------------+---------------------+
| Ambulatory Referral  | Outpatient  | Routin |   Pulmonary          | Ordered: 2019 |
| to Pulmonary Rehab   | Referral    | e      | hypertension (HCC)   |                     |
|                      |             |        | Shortness of breath  |                     |
+----------------------+-------------+--------+----------------------+---------------------+
 documented as of this encounter
 
 Visit Diagnoses
 
 
+----------------------------------------------------------------------------------+
| Diagnosis                                                                        |
+----------------------------------------------------------------------------------+
|   Pulmonary hypertension (HCC) - Primary  Other chronic pulmonary heart diseases |
+----------------------------------------------------------------------------------+
|   Shortness of breath                                                            |
+----------------------------------------------------------------------------------+
 documented in this encounter

## 2020-01-08 NOTE — XMS
Encounter Summary
  Created on: 2020
 
 Viviana Ricci
 External Reference #: [id number]
 : 47
 Sex: Female
 
 Demographics
 
 
+-----------------------+----------------------+
| Address               | [address]      |
|                       | [place]  [address] |
+-----------------------+----------------------+
| Home Phone            | [phone]      |
+-----------------------+----------------------+
| Preferred Language    | Unknown              |
+-----------------------+----------------------+
| Marital Status        | Single               |
+-----------------------+----------------------+
| Pentec[employer]alism Affiliation | [id number]                 |
+-----------------------+----------------------+
| Race                  | Unknown              |
+-----------------------+----------------------+
| Ethnic Group          | Unknown              |
+-----------------------+----------------------+
 
 
 Author
 
 
+--------------+--------------------------------------------+
| Author       | [facility]  |
|              | and [facility]                                |
+--------------+--------------------------------------------+
| Organization | [facility]  |
|              | and [facility]                                |
+--------------+--------------------------------------------+
| Address      | Unknown                                    |
+--------------+--------------------------------------------+
| Phone        | Unavailable                                |
+--------------+--------------------------------------------+
 
 
 
 Support
 
 
+----------------+--------------+---------------------+-----------------+
| Name           | Relationship | Address             | Phone           |
+----------------+--------------+---------------------+-----------------+
| Ada/Ed Radhames | ECON         | [place]              | [phone] |
|                |              | [place]  |                 |
|                |              |  [address]              |                 |
+----------------+--------------+---------------------+-----------------+
 
 
 
 
 Care Team Providers
 
 
+-----------------------+------+-------------+
| Care Team Member Name | Role | Phone       |
+-----------------------+------+-------------+
 PCP  | Unavailable |
+-----------------------+------+-------------+
 
 
 
 Reason for Visit
 
 
+-------------------+----------+
| Reason            | Comments |
+-------------------+----------+
| Medication Refill |          |
+-------------------+----------+
 
 
 
 Encounter Details
 
 
+--------+--------+---------------------+----------------------+-------------------+
| Date   | Type   | Department          | Care Team            | Description       |
+--------+--------+---------------------+----------------------+-------------------+
| / | Refill |   [facility]         |   Marzena Moser  | Medication Refill |
|    |        | NEPHROLOGY  [address]   | M, DO  [address]      |                   |
|        |        | [address]   | [address]      |                   |
|        |        | [place], [place]     | [place], [place]      |                   |
|        |        | [address]          | [address]  [phone]  |                   |
|        |        | [phone]        |  [phone] (Fax)  |                   |
+--------+--------+---------------------+----------------------+-------------------+
 
 
 
 Social History
 
 
+--------------+-------+-----------+--------+------+
| Tobacco Use  | Types | Packs/Day | Years  | Date |
|              |       |           | Used   |      |
+--------------+-------+-----------+--------+------+
| Never Smoker |       |           |        |      |
+--------------+-------+-----------+--------+------+
 
 
 
+---------------------+---+---+---+
| Smokeless Tobacco:  |   |   |   |
| Never Used          |   |   |   |
+---------------------+---+---+---+
 
 
 
+---------------------------------------------------------------+
| Comments: some second hand smoke exposure, but fairly minimal |
 
+---------------------------------------------------------------+
 
 
 
+-------------+-------------+---------+----------+
| Alcohol Use | Drinks/Week | oz/Week | Comments |
+-------------+-------------+---------+----------+
| No          |             |         |          |
+-------------+-------------+---------+----------+
 
 
 
+------------------+---------------+
| Sex Assigned at  | Date Recorded |
| Birth            |               |
+------------------+---------------+
| Not on file      |               |
+------------------+---------------+
 
 
 
+----------------+-------------+-------------+
| Job Start Date | Occupation  | Industry    |
+----------------+-------------+-------------+
| Not on file    | Not on file | Not on file |
+----------------+-------------+-------------+
 
 
 
+----------------+--------------+------------+
| Travel History | Travel Start | Travel End |
+----------------+--------------+------------+
 
 
 
+-------------------------------------+
| No recent travel history available. |
+-------------------------------------+
 documented as of this encounter
 
 Plan of Treatment
 
 
+--------+-----------+------------+----------------------+-------------------+
| Date   | Type      | Specialty  | Care Team            | Description       |
+--------+-----------+------------+----------------------+-------------------+
| / | Office    | Cardiology |   HellalfredoTonia,    |                   |
|    | Visit     |            | ARNP  [address]   |                   |
|        |           |            | St  [place], [place]  |                   |
|        |           |            | [address]  [phone]  |                   |
|        |           |            |  [phone] (Fax)  |                   |
+--------+-----------+------------+----------------------+-------------------+
| / | Hospital  | Radiology  |   Popeye Townsend, |                   |
|    | Encounter |            |  MD  [address] |                   |
|        |           |            |  St.  [place],   |                   |
|        |           |            | [place] [address]             |                   |
|        |           |            | [phone]         |                   |
|        |           |            | [phone] (Fax)   |                   |
+--------+-----------+------------+----------------------+-------------------+
| / | Surgery   | Radiology  |   Popeye Townsend, | CV EP PPM SYSTEM  |
 
|    |           |            |  MD  [address] | IMPLANT           |
|        |           |            |  St.  [place],   |                   |
|        |           |            | [place] [address]             |                   |
|        |           |            | [phone]         |                   |
|        |           |            | [phone] (Fax)   |                   |
+--------+-----------+------------+----------------------+-------------------+
| 02/10/ | Clinical  | Cardiology |                      |                   |
|    | Support   |            |                      |                   |
+--------+-----------+------------+----------------------+-------------------+
| / | Office    | Cardiology |   Tonia Wilson,    |                   |
|    | Visit     |            | ARNP  [address]   |                   |
|        |           |            | [place]  |                   |
|        |           |            | [address]  [phone]  |                   |
|        |           |            |  [phone] (Fax)  |                   |
+--------+-----------+------------+----------------------+-------------------+
| / | Off-Site  | Nephrology |   Marzena Moser  |                   |
|    | Visit     |            | DO ETIENNE  [address] [place] [address]      |                   |
|        |           |            | [address]      |                   |
|        |           |            | [place]      |                   |
|        |           |            | [address]  [phone]  |                   |
|        |           |            |  [phone] (Fax)  |                   |
+--------+-----------+------------+----------------------+-------------------+
 documented as of this encounter
 
 Visit Diagnoses
 
 
+------------------------------------------------------------------------------------------+
| Diagnosis                                                                                |
+------------------------------------------------------------------------------------------+
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
| stage IV, or unspecified(403.90) - Primary  Unspecified hypertensive kidney disease with |
|  chronic kidney disease stage I through stage IV, or unspecified                         |
+------------------------------------------------------------------------------------------+
|   Complications of transplanted kidney                                                   |
+------------------------------------------------------------------------------------------+
|   DIABETES MELLITUS, TYPE II, UNCONTROLLED  Type II or unspecified type diabetes         |
| mellitus without mention of complication, uncontrolled                                   |
+------------------------------------------------------------------------------------------+
|   Hyperlipidemia  Other and unspecified hyperlipidemia                                   |
+------------------------------------------------------------------------------------------+
 documented in this encounter

## 2020-01-08 NOTE — XMS
Encounter Summary
  Created on: 2020
 
 Viviana Ricci
 External Reference #: [id number]
 : 47
 Sex: Female
 
 Demographics
 
 
+-----------------------+----------------------+
| Address               | [address]      |
|                       | [place]  [address] |
+-----------------------+----------------------+
| Home Phone            | [phone]      |
+-----------------------+----------------------+
| Preferred Language    | Unknown              |
+-----------------------+----------------------+
| Marital Status        | Single               |
+-----------------------+----------------------+
| Caodaism Affiliation | 1009                 |
+-----------------------+----------------------+
| Race                  | Unknown              |
+-----------------------+----------------------+
| Ethnic Group          | Unknown              |
+-----------------------+----------------------+
 
 
 Author
 
 
+--------------+--------------------------------------------+
| Author       | [facility]  |
|              | and [facility]                                |
+--------------+--------------------------------------------+
| Organization | [facility]  |
|              | and [facility]                                |
+--------------+--------------------------------------------+
| Address      | Unknown                                    |
+--------------+--------------------------------------------+
| Phone        | Unavailable                                |
+--------------+--------------------------------------------+
 
 
 
 Support
 
 
+----------------+--------------+---------------------+-----------------+
| Name           | Relationship | Address             | Phone           |
+----------------+--------------+---------------------+-----------------+
| Ada/Ed Radhmaes | ECON         | [place]              | [phone] |
|                |              | [place]  |                 |
|                |              |  [address]              |                 |
+----------------+--------------+---------------------+-----------------+
 
 
 
 
 Care Team Providers
 
 
+------------------------+------+-----------------+
| Care Team Member Name  | Role | Phone           |
+------------------------+------+-----------------+
| Marzena Mosre DO | PCP  | [phone] |
+------------------------+------+-----------------+
 
 
 
 Reason for Visit
 
 
+---------+----------+
| Reason  | Comments |
+---------+----------+
| Results |          |
+---------+----------+
 
 
 
 Encounter Details
 
 
+--------+-----------+----------------------+----------------------+-------------+
| Date   | Type      | Department           | Care Team            | Description |
+--------+-----------+----------------------+----------------------+-------------+
| / | Telephone |   [facility] [place]          |   Tonia Wilson,    | Results     |
| 2018   |           | CARDIOLOGY  [address]    | ARNP  [address]   |             |
|        |           | [address]  [place] | [place]  [place], [place]  |             |
|        |           |  [place] [address]       | [address]  [phone]  |             |
|        |           | [phone]         |  [phone] (Fax)  |             |
+--------+-----------+----------------------+----------------------+-------------+
 
 
 
 Social History
 
 
+--------------+-------+-----------+--------+------+
| Tobacco Use  | Types | Packs/Day | Years  | Date |
|              |       |           | Used   |      |
+--------------+-------+-----------+--------+------+
| Never Smoker |       |           |        |      |
+--------------+-------+-----------+--------+------+
 
 
 
+---------------------+---+---+---+
| Smokeless Tobacco:  |   |   |   |
| Never Used          |   |   |   |
+---------------------+---+---+---+
 
 
 
+---------------------------------------------------------------+
| Comments: some second hand smoke exposure, but fairly minimal |
+---------------------------------------------------------------+
 
 
 
 
+-------------+-------------+---------+----------+
| Alcohol Use | Drinks/Week | oz/Week | Comments |
+-------------+-------------+---------+----------+
| No          |             |         |          |
+-------------+-------------+---------+----------+
 
 
 
+------------------+---------------+
| Sex Assigned at  | Date Recorded |
| Birth            |               |
+------------------+---------------+
| Not on file      |               |
+------------------+---------------+
 
 
 
+----------------+-------------+-------------+
| Job Start Date | Occupation  | Industry    |
+----------------+-------------+-------------+
| Not on file    | Not on file | Not on file |
+----------------+-------------+-------------+
 
 
 
+----------------+--------------+------------+
| Travel History | Travel Start | Travel End |
+----------------+--------------+------------+
 
 
 
+-------------------------------------+
| No recent travel history available. |
+-------------------------------------+
 documented as of this encounter
 
 Plan of Treatment
 
 
+--------+-----------+------------+----------------------+-------------------+
| Date   | Type      | Specialty  | Care Team            | Description       |
+--------+-----------+------------+----------------------+-------------------+
| / | Office    | Cardiology |   Tonia Wilson,    |                   |
|    | Visit     |            | ARNJESSICA  [address]   |                   |
|        |           |            | St  [place], [place]  |                   |
|        |           |            | [address]  [phone]  |                   |
|        |           |            |  [phone] (Fax)  |                   |
+--------+-----------+------------+----------------------+-------------------+
| / | Hospital  | Radiology  |   Popeye Townsend, |                   |
|    | Encounter |            |  MD  [address] |                   |
|        |           |            |  St.  [place],   |                   |
|        |           |            | [place] [address]             |                   |
|        |           |            | [phone]         |                   |
|        |           |            | [phone] (Fax)   |                   |
+--------+-----------+------------+----------------------+-------------------+
| / | Surgery   | Radiology  |   Popeye Townsend, | CV EP PPM SYSTEM  |
|    |           |            |  MD  [address] | IMPLANT           |
 
|        |           |            |  St.  [place],   |                   |
|        |           |            | [place] [address]             |                   |
|        |           |            | [phone]         |                   |
|        |           |            | [phone] (Fax)   |                   |
+--------+-----------+------------+----------------------+-------------------+
| 02/10/ | Clinical  | Cardiology |                      |                   |
|    | Support   |            |                      |                   |
+--------+-----------+------------+----------------------+-------------------+
| / | Office    | Cardiology |   RakeshmaxxTonia fuentes,    |                   |
|    | Visit     |            | [facility]  [address]   |                   |
|        |           |            | [place]  |                   |
|        |           |            | [address]  [phone]  |                   |
|        |           |            |  [phone] (Fax)  |                   |
+--------+-----------+------------+----------------------+-------------------+
| / | Off-Site  | Nephrology |   Marzena Moser  |                   |
|    | Visit     |            | DO ETIENNE  [address] [place] [address]      |                   |
|        |           |            | [address]      |                   |
|        |           |            | [place]      |                   |
|        |           |            | [address]  [phone]  |                   |
|        |           |            |  [phone] (Fax)  |                   |
+--------+-----------+------------+----------------------+-------------------+
 documented as of this encounter
 
 Visit Diagnoses
 
 
+---------------------------------------------------------------+
| Diagnosis                                                     |
+---------------------------------------------------------------+
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
+---------------------------------------------------------------+
 documented in this encounter

## 2020-01-08 NOTE — XMS
Encounter Summary
  Created on: 2020
 
 Viviana Ricci
 External Reference #: [id number]
 : 47
 Sex: Female
 
 Demographics
 
 
+-----------------------+----------------------+
| Address               | [address]      |
|                       | [place]  [address] |
+-----------------------+----------------------+
| Home Phone            | [phone]      |
+-----------------------+----------------------+
| Preferred Language    | Unknown              |
+-----------------------+----------------------+
| Marital Status        | Single               |
+-----------------------+----------------------+
| Christianity Affiliation | 1009                 |
+-----------------------+----------------------+
| Race                  | Unknown              |
+-----------------------+----------------------+
| Ethnic Group          | Unknown              |
+-----------------------+----------------------+
 
 
 Author
 
 
+--------------+--------------------------------------------+
| Author       | [facility]  |
|              | and [place]                                |
+--------------+--------------------------------------------+
| Organization | [facility]  |
|              | and [place]                                |
+--------------+--------------------------------------------+
| Address      | Unknown                                    |
+--------------+--------------------------------------------+
| Phone        | Unavailable                                |
+--------------+--------------------------------------------+
 
 
 
 Support
 
 
+----------------+--------------+---------------------+-----------------+
| Name           | Relationship | Address             | Phone           |
+----------------+--------------+---------------------+-----------------+
| Ada/Ed Radhames | ECON         | [place]              | [phone] |
|                |              | [place]  |                 |
|                |              |  [id number]              |                 |
+----------------+--------------+---------------------+-----------------+
 
 
 
 
 Care Team Providers
 
 
+-----------------------+------+-------------+
| Care Team Member Name | Role | Phone       |
+-----------------------+------+-------------+
 PCP  | Unavailable |
+-----------------------+------+-------------+
 
 
 
 Encounter Details
 
 
+--------+----------+---------------------+----------------------+-------------+
| Date   | Type     | Department          | Care Team            | Description |
+--------+----------+---------------------+----------------------+-------------+
| / | Abstract |   [facility] [place]         |   Marzena Moser  |             |
|    |          | NEPHROLOGY  [address]   | M, DO  [address]      |             |
|        |          | [address]   | [address]      |             |
|        |          | [place], [place]     | [place]      |             |
|        |          | [address]          | [address]  [phone]  |             |
|        |          | [phone]        |  [phone] (Fax)  |             |
+--------+----------+---------------------+----------------------+-------------+
 
 
 
 Social History
 
 
+--------------+-------+-----------+--------+------+
| Tobacco Use  | Types | Packs/Day | Years  | Date |
|              |       |           | Used   |      |
+--------------+-------+-----------+--------+------+
| Never Smoker |       |           |        |      |
+--------------+-------+-----------+--------+------+
 
 
 
+---------------------+---+---+---+
| Smokeless Tobacco:  |   |   |   |
| Never Used          |   |   |   |
+---------------------+---+---+---+
 
 
 
+---------------------------------------------------------------+
| Comments: some second hand smoke exposure, but fairly minimal |
+---------------------------------------------------------------+
 
 
 
+-------------+-------------+---------+----------+
| Alcohol Use | Drinks/Week | oz/Week | Comments |
+-------------+-------------+---------+----------+
| No          |             |         |          |
+-------------+-------------+---------+----------+
 
 
 
 
+------------------+---------------+
| Sex Assigned at  | Date Recorded |
| Birth            |               |
+------------------+---------------+
| Not on file      |               |
+------------------+---------------+
 
 
 
+----------------+-------------+-------------+
| Job Start Date | Occupation  | Industry    |
+----------------+-------------+-------------+
| Not on file    | Not on file | Not on file |
+----------------+-------------+-------------+
 
 
 
+----------------+--------------+------------+
| Travel History | Travel Start | Travel End |
+----------------+--------------+------------+
 
 
 
+-------------------------------------+
| No recent travel history available. |
+-------------------------------------+
 documented as of this encounter
 
 Plan of Treatment
 
 
+--------+-----------+------------+----------------------+-------------------+
| Date   | Type      | Specialty  | Care Team            | Description       |
+--------+-----------+------------+----------------------+-------------------+
| / | Office    | Cardiology |   Tonia Wilson,    |                   |
|    | Visit     |            | ARNJESSICA  [address]   |                   |
|        |           |            | [place]  |                   |
|        |           |            | [address]  [phone]  |                   |
|        |           |            |  [phone] (Fax)  |                   |
+--------+-----------+------------+----------------------+-------------------+
| / | Hospital  | Radiology  |   Popeye Townsend, |                   |
|    | Encounter |            |  MD  [address] |                   |
|        |           |            |  St.  [place],   |                   |
|        |           |            | [place] [address]             |                   |
|        |           |            | [phone]         |                   |
|        |           |            | [phone] (Fax)   |                   |
+--------+-----------+------------+----------------------+-------------------+
| / | Surgery   | Radiology  |   Popeye Townsend, | CV EP PPM SYSTEM  |
|    |           |            |  MD  [address] | IMPLANT           |
|        |           |            |  St.  [place],   |                   |
|        |           |            | [place] [address]             |                   |
|        |           |            | [phone]         |                   |
|        |           |            | [phone] (Fax)   |                   |
+--------+-----------+------------+----------------------+-------------------+
| 02/10/ | Clinical  | Cardiology |                      |                   |
|    | Support   |            |                      |                   |
+--------+-----------+------------+----------------------+-------------------+
| / | Office    | Cardiology |   Tonia Wilson,    |                   |
|    | Visit     |            | ARNP  [address]   |                   |
 
|        |           |            | St  [place], [place]  |                   |
|        |           |            | [address]  [phone]  |                   |
|        |           |            |  [phone] (Fax)  |                   |
+--------+-----------+------------+----------------------+-------------------+
| / | Off-Site  | Nephrology |   Marzena Moser  |                   |
|    | Visit     |            | DO ETIENNE  [address] [place] [address]      |                   |
|        |           |            | [address]      |                   |
|        |           |            | [place]      |                   |
|        |           |            | [address]  [phone]  |                   |
|        |           |            |  [phone] (Fax)  |                   |
+--------+-----------+------------+----------------------+-------------------+
 documented as of this encounter
 
 Procedures
 
 
+------------------+--------+------------+----------------------+----------------------+
| Procedure Name   | Priori | Date/Time  | Associated Diagnosis | Comments             |
|                  | ty     |            |                      |                      |
+------------------+--------+------------+----------------------+----------------------+
| EXTERNAL LAB:    | Routin | 2018 |                      |   Results for this   |
| URINALYSIS       | e      |            |                      | procedure are in the |
|                  |        |            |                      |  results section.    |
+------------------+--------+------------+----------------------+----------------------+
| URINALYSIS WITH  | Routin | 2018 |                      |   Results for this   |
| MICROSCOPIC      | e      |            |                      | procedure are in the |
|                  |        |            |                      |  results section.    |
+------------------+--------+------------+----------------------+----------------------+
 documented in this encounter
 
 Results
 Urinalysis With Microscopic (2018)
 
+-------------+----------+----------------+------------+--------------+
| Component   | Value    | Ref Range      | Performed  | Pathologist  |
|             |          |                | At         | Signature    |
+-------------+----------+----------------+------------+--------------+
| WBC UA      | 15       | /HPF           |            |              |
+-------------+----------+----------------+------------+--------------+
| Color,      | Yellow   | Light Yellow,  |            |              |
| Urine       |          | Yellow         |            |              |
+-------------+----------+----------------+------------+--------------+
| Clarity     | Clear    |                |            |              |
+-------------+----------+----------------+------------+--------------+
| BACTERIA UA | 1+ (A)   | Negative /HPF  |            |              |
+-------------+----------+----------------+------------+--------------+
| SQUAMOUS    | 1        | /HPF           |            |              |
| EPITHELIAL  |          |                |            |              |
| UA          |          |                |            |              |
+-------------+----------+----------------+------------+--------------+
| Nitrite,    | Negative | Negative       |            |              |
| Urine       |          |                |            |              |
+-------------+----------+----------------+------------+--------------+
 
 
 
+----------+
| Specimen |
+----------+
| Urine    |
 
+----------+
 External Lab: Urinalysis (2018)
 
+-------------+----------+-----------+------------+--------------+
| Component   | Value    | Ref Range | Performed  | Pathologist  |
|             |          |           | At         | Signature    |
+-------------+----------+-----------+------------+--------------+
| UA Blood,   | negative |           | EXTERNAL   |              |
| External    |          |           | LAB        |              |
+-------------+----------+-----------+------------+--------------+
| UA Glucose, | normal   |           | EXTERNAL   |              |
|  External   |          |           | LAB        |              |
+-------------+----------+-----------+------------+--------------+
| UA Ketones, | negative |           | EXTERNAL   |              |
|  External   |          |           | LAB        |              |
+-------------+----------+-----------+------------+--------------+
| UA Ph,      | 5        |           | EXTERNAL   |              |
| External    |          |           | LAB        |              |
+-------------+----------+-----------+------------+--------------+
| UA          | 100      |           | EXTERNAL   |              |
| Proteins,   |          |           | LAB        |              |
| External    |          |           |            |              |
+-------------+----------+-----------+------------+--------------+
| UA RBC,     | 2        |           | EXTERNAL   |              |
| External    |          |           | LAB        |              |
+-------------+----------+-----------+------------+--------------+
| UA Specific | 1.013    |           | EXTERNAL   |              |
|  Gravity,   |          |           | LAB        |              |
| External    |          |           |            |              |
+-------------+----------+-----------+------------+--------------+
| UA          | trace    |           | EXTERNAL   |              |
| Leukocyte   |          |           | LAB        |              |
| Esterase,   |          |           |            |              |
| External    |          |           |            |              |
+-------------+----------+-----------+------------+--------------+
 
 
 
+----------------+---------+--------------------+--------------+
| Performing     | Address | City/State/Zipcode | Phone Number |
| Organization   |         |                    |              |
+----------------+---------+--------------------+--------------+
|   EXTERNAL LAB |         |                    |              |
+----------------+---------+--------------------+--------------+
 documented in this encounter
 
 Visit Diagnoses
 Not on filedocumented in this encounter

## 2020-01-08 NOTE — XMS
Encounter Summary
  Created on: 2020
 
 Viviana Ricci
 External Reference #: [id number]
 : 47
 Sex: Female
 
 Demographics
 
 
+-----------------------+----------------------+
| Address               | [address]      |
|                       | [place]  [address] |
+-----------------------+----------------------+
| Home Phone            | [phone]      |
+-----------------------+----------------------+
| Preferred Language    | Unknown              |
+-----------------------+----------------------+
| Marital Status        | Single               |
+-----------------------+----------------------+
| Mandaen Affiliation | 1009                 |
+-----------------------+----------------------+
| Race                  | Unknown              |
+-----------------------+----------------------+
| Ethnic Group          | Unknown              |
+-----------------------+----------------------+
 
 
 Author
 
 
+--------------+--------------------------------------------+
| Author       | [facility]  |
|              | and [place]                                |
+--------------+--------------------------------------------+
| Organization | [facility]  |
|              | and [place]                                |
+--------------+--------------------------------------------+
| Address      | Unknown                                    |
+--------------+--------------------------------------------+
| Phone        | Unavailable                                |
+--------------+--------------------------------------------+
 
 
 
 Support
 
 
+----------------+--------------+---------------------+-----------------+
| Name           | Relationship | Address             | Phone           |
+----------------+--------------+---------------------+-----------------+
| Ada/Ed Radhames | ECON         | [place]              | [phone] |
|                |              | [place]  |                 |
|                |              |  [address]              |                 |
+----------------+--------------+---------------------+-----------------+
 
 
 
 
 Care Team Providers
 
 
+-----------------------+------+-------------+
| Care Team Member Name | Role | Phone       |
+-----------------------+------+-------------+
 PCP  | Unavailable |
+-----------------------+------+-------------+
 
 
 
 Encounter Details
 
 
+--------+----------+---------------------+----------------------+-------------+
| Date   | Type     | Department          | Care Team            | Description |
+--------+----------+---------------------+----------------------+-------------+
| / | Abstract |   [facility] [place]         |   Marzena Moser  |             |
|    |          | NEPHROLOGY  [address]   | M, DO  [address]      |             |
|        |          | [address]   | [address]      |             |
|        |          | [place], [place]     | [place]      |             |
|        |          | [address]          | [address]  [phone]  |             |
|        |          | [phone]        |  [phone] (Fax)  |             |
+--------+----------+---------------------+----------------------+-------------+
 
 
 
 Social History
 
 
+--------------+-------+-----------+--------+------+
| Tobacco Use  | Types | Packs/Day | Years  | Date |
|              |       |           | Used   |      |
+--------------+-------+-----------+--------+------+
| Never Smoker |       |           |        |      |
+--------------+-------+-----------+--------+------+
 
 
 
+---------------------+---+---+---+
| Smokeless Tobacco:  |   |   |   |
| Never Used          |   |   |   |
+---------------------+---+---+---+
 
 
 
+---------------------------------------------------------------+
| Comments: some second hand smoke exposure, but fairly minimal |
+---------------------------------------------------------------+
 
 
 
+-------------+-------------+---------+----------+
| Alcohol Use | Drinks/Week | oz/Week | Comments |
+-------------+-------------+---------+----------+
| No          |             |         |          |
+-------------+-------------+---------+----------+
 
 
 
 
+------------------+---------------+
| Sex Assigned at  | Date Recorded |
| Birth            |               |
+------------------+---------------+
| Not on file      |               |
+------------------+---------------+
 
 
 
+----------------+-------------+-------------+
| Job Start Date | Occupation  | Industry    |
+----------------+-------------+-------------+
| Not on file    | Not on file | Not on file |
+----------------+-------------+-------------+
 
 
 
+----------------+--------------+------------+
| Travel History | Travel Start | Travel End |
+----------------+--------------+------------+
 
 
 
+-------------------------------------+
| No recent travel history available. |
+-------------------------------------+
 documented as of this encounter
 
 Plan of Treatment
 
 
+--------+-----------+------------+----------------------+-------------------+
| Date   | Type      | Specialty  | Care Team            | Description       |
+--------+-----------+------------+----------------------+-------------------+
| / | Office    | Cardiology |   Tonia Wilson,    |                   |
|    | Visit     |            | ARNJESSICA  [address]   |                   |
|        |           |            | [place]  |                   |
|        |           |            | [address]  [phone]  |                   |
|        |           |            |  [phone] (Fax)  |                   |
+--------+-----------+------------+----------------------+-------------------+
| / | Hospital  | Radiology  |   Popeye Townsend, |                   |
|    | Encounter |            |  MD  [address] |                   |
|        |           |            |  St.  [place],   |                   |
|        |           |            | [place] [address]             |                   |
|        |           |            | [phone]         |                   |
|        |           |            | [phone] (Fax)   |                   |
+--------+-----------+------------+----------------------+-------------------+
| / | Surgery   | Radiology  |   Popeye Townsend, | CV EP PPM SYSTEM  |
|    |           |            |  MD  [address] | IMPLANT           |
|        |           |            |  St.  [place],   |                   |
|        |           |            | [place] [address]             |                   |
|        |           |            | [phone]         |                   |
|        |           |            | [phone] (Fax)   |                   |
+--------+-----------+------------+----------------------+-------------------+
| 02/10/ | Clinical  | Cardiology |                      |                   |
|    | Support   |            |                      |                   |
+--------+-----------+------------+----------------------+-------------------+
| / | Office    | Cardiology |   Tonia Wilson,    |                   |
|    | Visit     |            | ARNP  [address]   |                   |
 
|        |           |            | St  [place], [place]  |                   |
|        |           |            | [address] [phone]  |                   |
|        |           |            |  [phone] (Fax)  |                   |
+--------+-----------+------------+----------------------+-------------------+
| / | Off-Site  | Nephrology |   Marzena Moser  |                   |
| 2020   | Visit     |            | DO ETIENNE  [address] [place] [address]      |                   |
|        |           |            | [address]      |                   |
|        |           |            | [place]      |                   |
|        |           |            | [id number] [phone]  |                   |
|        |           |            |  [phone] (Fax)  |                   |
+--------+-----------+------------+----------------------+-------------------+
 documented as of this encounter
 
 Visit Diagnoses
 Not on filedocumented in this encounter

## 2020-01-08 NOTE — XMS
Encounter Summary
  Created on: 2020
 
 Viviana Ricci
 External Reference #: [id number]
 : 47
 Sex: Female
 
 Demographics
 
 
+-----------------------+----------------------+
| Address               | [address]      |
|                       | [place]  [address] |
+-----------------------+----------------------+
| Home Phone            | [phone]      |
+-----------------------+----------------------+
| Preferred Language    | Unknown              |
+-----------------------+----------------------+
| Marital Status        | Single               |
+-----------------------+----------------------+
| Yazdanism Affiliation | [id number]                 |
+-----------------------+----------------------+
| Race                  | Unknown              |
+-----------------------+----------------------+
| Ethnic Group          | Unknown              |
+-----------------------+----------------------+
 
 
 Author
 
 
+--------------+--------------------------------------------+
| Author       | [facility]  |
|              | and [place]                                |
+--------------+--------------------------------------------+
| Organization | [facility]  |
|              | and [place]                                |
+--------------+--------------------------------------------+
| Address      | Unknown                                    |
+--------------+--------------------------------------------+
| Phone        | Unavailable                                |
+--------------+--------------------------------------------+
 
 
 
 Support
 
 
+----------------+--------------+---------------------+-----------------+
| Name           | Relationship | Address             | Phone           |
+----------------+--------------+---------------------+-----------------+
| Ada/Ed Radhames | ECON         | [place]              | [phone] |
|                |              | [place]  |                 |
|                |              |  [address]              |                 |
+----------------+--------------+---------------------+-----------------+
 
 
 
 
 Care Team Providers
 
 
+-----------------------+------+-------------+
| Care Team Member Name | Role | Phone       |
+-----------------------+------+-------------+
 PCP  | Unavailable |
+-----------------------+------+-------------+
 
 
 
 Reason for Visit
 
 
+-------------------+----------+
| Reason            | Comments |
+-------------------+----------+
| Medication Refill |          |
+-------------------+----------+
 
 
 
 Encounter Details
 
 
+--------+--------+---------------------+----------------------+-------------------+
| Date   | Type   | Department          | Care Team            | Description       |
+--------+--------+---------------------+----------------------+-------------------+
| / | Refill |   [facility]         |   Marzena Moser  | Medication Refill |
|    |        | NEPHROLOGY  [address]   | M, DO  [address]      |                   |
|        |        | [address]   | [address]      |                   |
|        |        | [place], [place]     | [place], [place]      |                   |
|        |        | [address]          | [address]  [phone]  |                   |
|        |        | [phone]        |  [phone] (Fax)  |                   |
+--------+--------+---------------------+----------------------+-------------------+
 
 
 
 Social History
 
 
+--------------+-------+-----------+--------+------+
| Tobacco Use  | Types | Packs/Day | Years  | Date |
|              |       |           | Used   |      |
+--------------+-------+-----------+--------+------+
| Never Smoker |       |           |        |      |
+--------------+-------+-----------+--------+------+
 
 
 
+---------------------+---+---+---+
| Smokeless Tobacco:  |   |   |   |
| Never Used          |   |   |   |
+---------------------+---+---+---+
 
 
 
+---------------------------------------------------------------+
| Comments: some second hand smoke exposure, but fairly minimal |
 
+---------------------------------------------------------------+
 
 
 
+-------------+-------------+---------+----------+
| Alcohol Use | Drinks/Week | oz/Week | Comments |
+-------------+-------------+---------+----------+
| No          |             |         |          |
+-------------+-------------+---------+----------+
 
 
 
+------------------+---------------+
| Sex Assigned at  | Date Recorded |
| Birth            |               |
+------------------+---------------+
| Not on file      |               |
+------------------+---------------+
 
 
 
+----------------+-------------+-------------+
| Job Start Date | Occupation  | Industry    |
+----------------+-------------+-------------+
| Not on file    | Not on file | Not on file |
+----------------+-------------+-------------+
 
 
 
+----------------+--------------+------------+
| Travel History | Travel Start | Travel End |
+----------------+--------------+------------+
 
 
 
+-------------------------------------+
| No recent travel history available. |
+-------------------------------------+
 documented as of this encounter
 
 Plan of Treatment
 
 
+--------+-----------+------------+----------------------+-------------------+
| Date   | Type      | Specialty  | Care Team            | Description       |
+--------+-----------+------------+----------------------+-------------------+
| / | Office    | Cardiology |   HellalfredoTonia,    |                   |
|    | Visit     |            | ARNP  [address]   |                   |
|        |           |            | St  [place], [place]  |                   |
|        |           |            | [address]  [phone]  |                   |
|        |           |            |  [phone] (Fax)  |                   |
+--------+-----------+------------+----------------------+-------------------+
| / | Hospital  | Radiology  |   Popeye Townsend, |                   |
|    | Encounter |            |  MD  [address] |                   |
|        |           |            |  St.  [place],   |                   |
|        |           |            | [place] [address]             |                   |
|        |           |            | [phone]         |                   |
|        |           |            | [phone] (Fax)   |                   |
+--------+-----------+------------+----------------------+-------------------+
| / | Surgery   | Radiology  |   Popeye Townsend, | CV EP PPM SYSTEM  |
 
|    |           |            |  MD  [address] | IMPLANT           |
|        |           |            |  St.  [place],   |                   |
|        |           |            | [place] [address]             |                   |
|        |           |            | [phone]         |                   |
|        |           |            | [phone] (Fax)   |                   |
+--------+-----------+------------+----------------------+-------------------+
| 02/10/ | Clinical  | Cardiology |                      |                   |
|    | Support   |            |                      |                   |
+--------+-----------+------------+----------------------+-------------------+
| / | Office    | Cardiology |   Tonia Wilson,    |                   |
|    | Visit     |            | ARNP  [address]   |                   |
|        |           |            | [place]  |                   |
|        |           |            | [address]  [phone]  |                   |
|        |           |            |  [phone] (Fax)  |                   |
+--------+-----------+------------+----------------------+-------------------+
| / | Off-Site  | Nephrology |   Marzena Moser  |                   |
|    | Visit     |            | DO ETIENNE  [address] [place] [address]      |                   |
|        |           |            | [address]      |                   |
|        |           |            | [place]      |                   |
|        |           |            | [address]  [phone]  |                   |
|        |           |            |  [phone] (Fax)  |                   |
+--------+-----------+------------+----------------------+-------------------+
 documented as of this encounter
 
 Visit Diagnoses
 Not on filedocumented in this encounter

## 2020-01-08 NOTE — XMS
Encounter Summary
  Created on: 2020
 
 Viviana Ricci
 External Reference #: [id number]
 : 47
 Sex: Female
 
 Demographics
 
 
+-----------------------+----------------------+
| Address               | [address]      |
|                       | [place]  [address] |
+-----------------------+----------------------+
| Home Phone            | [phone]      |
+-----------------------+----------------------+
| Preferred Language    | Unknown              |
+-----------------------+----------------------+
| Marital Status        | Single               |
+-----------------------+----------------------+
| Tenriism Affiliation | [id number]                 |
+-----------------------+----------------------+
| Race                  | Unknown              |
+-----------------------+----------------------+
| Ethnic Group          | Unknown              |
+-----------------------+----------------------+
 
 
 Author
 
 
+--------------+--------------------------------------------+
| Author       | [facility]  |
|              | and [place]                                |
+--------------+--------------------------------------------+
| Organization | [facility]  |
|              | and [place]                                |
+--------------+--------------------------------------------+
| Address      | Unknown                                    |
+--------------+--------------------------------------------+
| Phone        | Unavailable                                |
+--------------+--------------------------------------------+
 
 
 
 Support
 
 
+----------------+--------------+---------------------+-----------------+
| Name           | Relationship | Address             | Phone           |
+----------------+--------------+---------------------+-----------------+
| Ada/Ed Radhames | ECON         | [place]              | [phone] |
|                |              | [place]  |                 |
|                |              |  [address]              |                 |
+----------------+--------------+---------------------+-----------------+
 
 
 
 
 Care Team Providers
 
 
+-----------------------+------+-------------+
| Care Team Member Name | Role | Phone       |
+-----------------------+------+-------------+
 PCP  | Unavailable |
+-----------------------+------+-------------+
 
 
 
 Reason for Visit
 
 
+-------------------+----------+
| Reason            | Comments |
+-------------------+----------+
| Medication Refill |          |
+-------------------+----------+
 
 
 
 Encounter Details
 
 
+--------+--------+---------------------+----------------------+-------------------+
| Date   | Type   | Department          | Care Team            | Description       |
+--------+--------+---------------------+----------------------+-------------------+
| / | Refill |   [facility]         |   Marzena Moser  | Medication Refill |
|    |        | NEPHROLOGY  [address]   | M, DO  [address]      |                   |
|        |        | [address]   | [address]      |                   |
|        |        | [place], [place]     | [place], [place]      |                   |
|        |        | [address]          | [address]  [phone]  |                   |
|        |        | [phone]        |  [phone] (Fax)  |                   |
+--------+--------+---------------------+----------------------+-------------------+
 
 
 
 Social History
 
 
+--------------+-------+-----------+--------+------+
| Tobacco Use  | Types | Packs/Day | Years  | Date |
|              |       |           | Used   |      |
+--------------+-------+-----------+--------+------+
| Never Smoker |       |           |        |      |
+--------------+-------+-----------+--------+------+
 
 
 
+---------------------+---+---+---+
| Smokeless Tobacco:  |   |   |   |
| Never Used          |   |   |   |
+---------------------+---+---+---+
 
 
 
+---------------------------------------------------------------+
| Comments: some second hand smoke exposure, but fairly minimal |
 
+---------------------------------------------------------------+
 
 
 
+-------------+-------------+---------+----------+
| Alcohol Use | Drinks/Week | oz/Week | Comments |
+-------------+-------------+---------+----------+
| No          |             |         |          |
+-------------+-------------+---------+----------+
 
 
 
+------------------+---------------+
| Sex Assigned at  | Date Recorded |
| Birth            |               |
+------------------+---------------+
| Not on file      |               |
+------------------+---------------+
 
 
 
+----------------+-------------+-------------+
| Job Start Date | Occupation  | Industry    |
+----------------+-------------+-------------+
| Not on file    | Not on file | Not on file |
+----------------+-------------+-------------+
 
 
 
+----------------+--------------+------------+
| Travel History | Travel Start | Travel End |
+----------------+--------------+------------+
 
 
 
+-------------------------------------+
| No recent travel history available. |
+-------------------------------------+
 documented as of this encounter
 
 Plan of Treatment
 
 
+--------+-----------+------------+----------------------+-------------------+
| Date   | Type      | Specialty  | Care Team            | Description       |
+--------+-----------+------------+----------------------+-------------------+
| / | Office    | Cardiology |   HellalfredoTonia,    |                   |
|    | Visit     |            | ARNP  [address]   |                   |
|        |           |            | St  [place], [place]  |                   |
|        |           |            | [address]  [phone]  |                   |
|        |           |            |  [phone] (Fax)  |                   |
+--------+-----------+------------+----------------------+-------------------+
| / | Hospital  | Radiology  |   Popeye Townsend, |                   |
|    | Encounter |            |  MD  [address] |                   |
|        |           |            |  St.  [place],   |                   |
|        |           |            | [place] [address]             |                   |
|        |           |            | [phone]         |                   |
|        |           |            | [phone] (Fax)   |                   |
+--------+-----------+------------+----------------------+-------------------+
| / | Surgery   | Radiology  |   Popeye Townsend, | CV EP PPM SYSTEM  |
 
|    |           |            |  MD  [address] | IMPLANT           |
|        |           |            |  St.  [place],   |                   |
|        |           |            | [place] [address]             |                   |
|        |           |            | [phone]         |                   |
|        |           |            | [phone] (Fax)   |                   |
+--------+-----------+------------+----------------------+-------------------+
| 02/10/ | Clinical  | Cardiology |                      |                   |
|    | Support   |            |                      |                   |
+--------+-----------+------------+----------------------+-------------------+
| / | Office    | Cardiology |   Tonia Wilson,    |                   |
|    | Visit     |            | ARNP  [address]   |                   |
|        |           |            | [place]  |                   |
|        |           |            | [address]  [phone]  |                   |
|        |           |            |  [phone] (Fax)  |                   |
+--------+-----------+------------+----------------------+-------------------+
| / | Off-Site  | Nephrology |   Marzena Moser  |                   |
|    | Visit     |            | DO ETIENEN  [address] [place] [address]      |                   |
|        |           |            | [address]      |                   |
|        |           |            | [place]      |                   |
|        |           |            | [address]  [phone]  |                   |
|        |           |            |  [phone] (Fax)  |                   |
+--------+-----------+------------+----------------------+-------------------+
 documented as of this encounter
 
 Visit Diagnoses
 Not on filedocumented in this encounter

## 2020-01-08 NOTE — XMS
Encounter Summary
  Created on: 2020
 
 Viviana Ricci
 External Reference #: [id number]
 : 47
 Sex: Female
 
 Demographics
 
 
+-----------------------+----------------------+
| Address               | [address]      |
|                       | [place]  [address] |
+-----------------------+----------------------+
| Home Phone            | [phone]      |
+-----------------------+----------------------+
| Preferred Language    | Unknown              |
+-----------------------+----------------------+
| Marital Status        | Single               |
+-----------------------+----------------------+
| Advent Affiliation | 1009                 |
+-----------------------+----------------------+
| Race                  | Unknown              |
+-----------------------+----------------------+
| Ethnic Group          | Unknown              |
+-----------------------+----------------------+
 
 
 Author
 
 
+--------------+--------------------------------------------+
| Author       | [facility]  |
|              | and [place]                                |
+--------------+--------------------------------------------+
| Organization | [facility]  |
|              | and [place]                                |
+--------------+--------------------------------------------+
| Address      | Unknown                                    |
+--------------+--------------------------------------------+
| Phone        | Unavailable                                |
+--------------+--------------------------------------------+
 
 
 
 Support
 
 
+----------------+--------------+---------------------+-----------------+
| Name           | Relationship | Address             | Phone           |
+----------------+--------------+---------------------+-----------------+
| Ada/Ed Radhames | ECON         | [place]              | [phone] |
|                |              | [place]  |                 |
|                |              |  [address]              |                 |
+----------------+--------------+---------------------+-----------------+
 
 
 
 
 Care Team Providers
 
 
+-----------------------+------+-------------+
| Care Team Member Name | Role | Phone       |
+-----------------------+------+-------------+
 PCP  | Unavailable |
+-----------------------+------+-------------+
 
 
 
 Encounter Details
 
 
+--------+----------+---------------------+----------------------+-------------+
| Date   | Type     | Department          | Care Team            | Description |
+--------+----------+---------------------+----------------------+-------------+
| / | Abstract |   [facility] [place]         |   Marzena Moser  |             |
|    |          | NEPHROLOGY  [address]   | M, DO  [address]      |             |
|        |          | [address]   | [address]      |             |
|        |          | [place], [place]     | [place]      |             |
|        |          | [address]          | [address]  [phone]  |             |
|        |          | [phone]        |  [phone] (Fax)  |             |
+--------+----------+---------------------+----------------------+-------------+
 
 
 
 Social History
 
 
+--------------+-------+-----------+--------+------+
| Tobacco Use  | Types | Packs/Day | Years  | Date |
|              |       |           | Used   |      |
+--------------+-------+-----------+--------+------+
| Never Smoker |       |           |        |      |
+--------------+-------+-----------+--------+------+
 
 
 
+---------------------+---+---+---+
| Smokeless Tobacco:  |   |   |   |
| Never Used          |   |   |   |
+---------------------+---+---+---+
 
 
 
+---------------------------------------------------------------+
| Comments: some second hand smoke exposure, but fairly minimal |
+---------------------------------------------------------------+
 
 
 
+-------------+-------------+---------+----------+
| Alcohol Use | Drinks/Week | oz/Week | Comments |
+-------------+-------------+---------+----------+
| No          |             |         |          |
+-------------+-------------+---------+----------+
 
 
 
 
+------------------+---------------+
| Sex Assigned at  | Date Recorded |
| Birth            |               |
+------------------+---------------+
| Not on file      |               |
+------------------+---------------+
 
 
 
+----------------+-------------+-------------+
| Job Start Date | Occupation  | Industry    |
+----------------+-------------+-------------+
| Not on file    | Not on file | Not on file |
+----------------+-------------+-------------+
 
 
 
+----------------+--------------+------------+
| Travel History | Travel Start | Travel End |
+----------------+--------------+------------+
 
 
 
+-------------------------------------+
| No recent travel history available. |
+-------------------------------------+
 documented as of this encounter
 
 Progress Maite Perez - 2016  8:11 AM PSTOutside record:  Received imaging from [facility], dos 16.  Sent to scan.
 Electronically signed by Maite Raygoza at 2016  8:13 AM PSTdocumented in this encou
nter
 
 Plan of Treatment
 
 
+--------+-----------+------------+----------------------+-------------------+
| Date   | Type      | Specialty  | Care Team            | Description       |
+--------+-----------+------------+----------------------+-------------------+
| / | Office    | Cardiology |   Tonia Wilson,    |                   |
|    | Visit     |            | ARNP  [address]   |                   |
|        |           |            | St  [place]  |                   |
|        |           |            | [address]  [phone]  |                   |
|        |           |            |  [phone] (Fax)  |                   |
+--------+-----------+------------+----------------------+-------------------+
| / | Hospital  | Radiology  |   Popeye Townsend, |                   |
|    | Encounter |            |  MD  [address] |                   |
|        |           |            |  St.  [place],   |                   |
|        |           |            | [place] [address]             |                   |
|        |           |            | [phone]         |                   |
|        |           |            | [phone] (Fax)   |                   |
+--------+-----------+------------+----------------------+-------------------+
| / | Surgery   | Radiology  |   Popeye Townsend, | CV EP PPM SYSTEM  |
|    |           |            |  MD  [address] | IMPLANT           |
|        |           |            |  St.  [place],   |                   |
|        |           |            | [place] [address]             |                   |
|        |           |            | [phone]         |                   |
|        |           |            | [phone] (Fax)   |                   |
 
+--------+-----------+------------+----------------------+-------------------+
| 02/10/ | Clinical  | Cardiology |                      |                   |
|    | Support   |            |                      |                   |
+--------+-----------+------------+----------------------+-------------------+
| / | Office    | Cardiology |   Tonia Wilson,    |                   |
|    | Visit     |            | BELKIS  [address]   |                   |
|        |           |            | [place]  |                   |
|        |           |            | [address]  [phone]  |                   |
|        |           |            |  [phone] (Fax)  |                   |
+--------+-----------+------------+----------------------+-------------------+
| / | Off-Site  | Nephrology |   Marzena Moser  |                   |
|    | Visit     |            | DO ETIENNE  [address] [place] [address]      |                   |
|        |           |            | [address]      |                   |
|        |           |            | [place]      |                   |
|        |           |            | [address]  [phone]  |                   |
|        |           |            |  [phone] (Fax)  |                   |
+--------+-----------+------------+----------------------+-------------------+
 documented as of this encounter
 
 Visit Diagnoses
 Not on filedocumented in this encounter

## 2020-01-08 NOTE — XMS
Encounter Summary
  Created on: 2020
 
 Viviana Ricci
 External Reference #: [id number]
 : 47
 Sex: Female
 
 Demographics
 
 
+-----------------------+----------------------+
| Address               | [address]      |
|                       | [place]  [address] |
+-----------------------+----------------------+
| Home Phone            | [phone]      |
+-----------------------+----------------------+
| Preferred Language    | Unknown              |
+-----------------------+----------------------+
| Marital Status        | Single               |
+-----------------------+----------------------+
| Yazidi Affiliation | 1009                 |
+-----------------------+----------------------+
| Race                  | Unknown              |
+-----------------------+----------------------+
| Ethnic Group          | Unknown              |
+-----------------------+----------------------+
 
 
 Author
 
 
+--------------+--------------------------------------------+
| Author       | [facility]  |
|              | and [place]                                |
+--------------+--------------------------------------------+
| Organization | [facility]  |
|              | and [place]                                |
+--------------+--------------------------------------------+
| Address      | Unknown                                    |
+--------------+--------------------------------------------+
| Phone        | Unavailable                                |
+--------------+--------------------------------------------+
 
 
 
 Support
 
 
+----------------+--------------+---------------------+-----------------+
| Name           | Relationship | Address             | Phone           |
+----------------+--------------+---------------------+-----------------+
| Ada/Ed Radhames | ECON         | [address]              | [phone] |
|                |              | [place]  |                 |
|                |              |  [address]              |                 |
+----------------+--------------+---------------------+-----------------+
 
 
 
 
 Care Team Providers
 
 
+------------------------+------+-----------------+
| Care Team Member Name  | Role | Phone           |
+------------------------+------+-----------------+
| Marzena Moser DO | PCP  | [phone] |
+------------------------+------+-----------------+
 
 
 
 Reason for Visit
 
 
+-------------------+----------+
| Reason            | Comments |
+-------------------+----------+
| Medication Refill |          |
+-------------------+----------+
 
 
 
 Encounter Details
 
 
+--------+--------+---------------------+----------------------+-------------------+
| Date   | Type   | Department          | Care Team            | Description       |
+--------+--------+---------------------+----------------------+-------------------+
| 10/25/ | Refill |   [place]         |   Marzena Moser  | Medication Refill |
| 2017   |        | NEPHROLOGY  [address]   | M, DO  [address]      |                   |
|        |        | [address]   | [address]      |                   |
|        |        | [place], [place]     | [place], [place]      |                   |
|        |        | [address]          | [address]  [phone]  |                   |
|        |        | [phone]        |  [phone] (Fax)  |                   |
+--------+--------+---------------------+----------------------+-------------------+
 
 
 
 Social History
 
 
+--------------+-------+-----------+--------+------+
| Tobacco Use  | Types | Packs/Day | Years  | Date |
|              |       |           | Used   |      |
+--------------+-------+-----------+--------+------+
| Never Smoker |       |           |        |      |
+--------------+-------+-----------+--------+------+
 
 
 
+---------------------+---+---+---+
| Smokeless Tobacco:  |   |   |   |
| Never Used          |   |   |   |
+---------------------+---+---+---+
 
 
 
+---------------------------------------------------------------+
| Comments: some second hand smoke exposure, but fairly minimal |
 
+---------------------------------------------------------------+
 
 
 
+-------------+-------------+---------+----------+
| Alcohol Use | Drinks/Week | oz/Week | Comments |
+-------------+-------------+---------+----------+
| No          |             |         |          |
+-------------+-------------+---------+----------+
 
 
 
+------------------+---------------+
| Sex Assigned at  | Date Recorded |
| Birth            |               |
+------------------+---------------+
| Not on file      |               |
+------------------+---------------+
 
 
 
+----------------+-------------+-------------+
| Job Start Date | Occupation  | Industry    |
+----------------+-------------+-------------+
| Not on file    | Not on file | Not on file |
+----------------+-------------+-------------+
 
 
 
+----------------+--------------+------------+
| Travel History | Travel Start | Travel End |
+----------------+--------------+------------+
 
 
 
+-------------------------------------+
| No recent travel history available. |
+-------------------------------------+
 documented as of this encounter
 
 Plan of Treatment
 
 
+--------+-----------+------------+----------------------+-------------------+
| Date   | Type      | Specialty  | Care Team            | Description       |
+--------+-----------+------------+----------------------+-------------------+
| / | Office    | Cardiology |   Tonia Wilson,    |                   |
|    | Visit     |            | ARNP  [address]   |                   |
|        |           |            | St [place], [place]  |                   |
|        |           |            | [address]  [phone]  |                   |
|        |           |            |  [phone] (Fax)  |                   |
+--------+-----------+------------+----------------------+-------------------+
| / | Hospital  | Radiology  |   Popeye Townsend, |                   |
|    | Encounter |            |  MD  [address] |                   |
|        |           |            |  [place],   |                   |
|        |           |            | [place] [address]             |                   |
|        |           |            | [phone]         |                   |
|        |           |            | [phone] (Fax)   |                   |
+--------+-----------+------------+----------------------+-------------------+
| / | Surgery   | Radiology  |   Popeye Townsend, | CV EP PPM SYSTEM  |
 
|    |           |            |  MD  [address] | IMPLANT           |
|        |           |            |  [place],   |                   |
|        |           |            | [place] [address]             |                   |
|        |           |            | [phone]         |                   |
|        |           |            | [phone] (Fax)   |                   |
+--------+-----------+------------+----------------------+-------------------+
| 02/10/ | Clinical  | Cardiology |                      |                   |
|    | Support   |            |                      |                   |
+--------+-----------+------------+----------------------+-------------------+
| / | Office    | Cardiology |   RakeshTonia andre,    |                   |
|    | Visit     |            | ARNP  [address]   |                   |
|        |           |            | [place]  |                   |
|        |           |            | [address] [phone]  |                   |
|        |           |            |  [phone] (Fax)  |                   |
+--------+-----------+------------+----------------------+-------------------+
| / | Off-Site  | Nephrology |   Marzena Moser  |                   |
|    | Visit     |            | DO ETIENNE  [address] [place] [address]      |                   |
|        |           |            | [address]      |                   |
|        |           |            | [place]      |                   |
|        |           |            | [address] [phone]  |                   |
|        |           |            |  [phone] (Fax)  |                   |
+--------+-----------+------------+----------------------+-------------------+
 documented as of this encounter
 
 Visit Diagnoses
 Not on filedocumented in this encounter

## 2020-01-08 NOTE — XMS
Encounter Summary
  Created on: 2020
 
 Viviana Ricci
 External Reference #: [id number]
 : 47
 Sex: Female
 
 Demographics
 
 
+-----------------------+----------------------+
| Address               | [address]      |
|                       | [place]  [address] |
+-----------------------+----------------------+
| Home Phone            | [phone]      |
+-----------------------+----------------------+
| Preferred Language    | Unknown              |
+-----------------------+----------------------+
| Marital Status        | Single               |
+-----------------------+----------------------+
| Sabianist Affiliation | [id number]                 |
+-----------------------+----------------------+
| Race                  | Unknown              |
+-----------------------+----------------------+
| Ethnic Group          | Unknown              |
+-----------------------+----------------------+
 
 
 Author
 
 
+--------------+--------------------------------------------+
| Author       | [facility]  |
|              | and [facility]                                |
+--------------+--------------------------------------------+
| Organization | [facility]  |
|              | and [facility]                                |
+--------------+--------------------------------------------+
| Address      | Unknown                                    |
+--------------+--------------------------------------------+
| Phone        | Unavailable                                |
+--------------+--------------------------------------------+
 
 
 
 Support
 
 
+----------------+--------------+---------------------+-----------------+
| Name           | Relationship | Address             | Phone           |
+----------------+--------------+---------------------+-----------------+
| Ada/Ed Radhames | ECON         | [address]              | [phone] |
|                |              | [place]  |                 |
|                |              |  [address]              |                 |
+----------------+--------------+---------------------+-----------------+
 
 
 
 
 Care Team Providers
 
 
+------------------------+------+-----------------+
| Care Team Member Name  | Role | Phone           |
+------------------------+------+-----------------+
| Marzena Moser DO | PCP  | [phone] |
+------------------------+------+-----------------+
 
 
 
 Encounter Details
 
 
+--------+----------+---------------------+----------------------+-------------+
| Date   | Type     | Department          | Care Team            | Description |
+--------+----------+---------------------+----------------------+-------------+
| / | Abstract |   [facility] [place]         |   Marzena Moser  |             |
| 2019   |          | NEPHROLOGY  [address]   | [address]      |             |
|        |          | [address]   | [address]      |             |
|        |          | [place], [place]     | [place], [place]      |             |
|        |          | [address]          | [address]  [phone]  |             |
|        |          | [phone]        |  [phone] (Fax)  |             |
+--------+----------+---------------------+----------------------+-------------+
 
 
 
 Social History
 
 
+--------------+-------+-----------+--------+------+
| Tobacco Use  | Types | Packs/Day | Years  | Date |
|              |       |           | Used   |      |
+--------------+-------+-----------+--------+------+
| Never Smoker |       |           |        |      |
+--------------+-------+-----------+--------+------+
 
 
 
+---------------------+---+---+---+
| Smokeless Tobacco:  |   |   |   |
| Never Used          |   |   |   |
+---------------------+---+---+---+
 
 
 
+---------------------------------------------------------------+
| Comments: some second hand smoke exposure, but fairly minimal |
+---------------------------------------------------------------+
 
 
 
+-------------+-------------+---------+----------+
| Alcohol Use | Drinks/Week | oz/Week | Comments |
+-------------+-------------+---------+----------+
| No          |             |         |          |
+-------------+-------------+---------+----------+
 
 
 
 
+------------------+---------------+
| Sex Assigned at  | Date Recorded |
| Birth            |               |
+------------------+---------------+
| Not on file      |               |
+------------------+---------------+
 
 
 
+----------------+-------------+-------------+
| Job Start Date | Occupation  | Industry    |
+----------------+-------------+-------------+
| Not on file    | Not on file | Not on file |
+----------------+-------------+-------------+
 
 
 
+----------------+--------------+------------+
| Travel History | Travel Start | Travel End |
+----------------+--------------+------------+
 
 
 
+-------------------------------------+
| No recent travel history available. |
+-------------------------------------+
 documented as of this encounter
 
 Plan of Treatment
 
 
+--------+-----------+------------+----------------------+-------------------+
| Date   | Type      | Specialty  | Care Team            | Description       |
+--------+-----------+------------+----------------------+-------------------+
| / | Office    | Cardiology |   Tonia Wilson,    |                   |
|    | Visit     |            | ARNP  [address]   |                   |
|        |           |            | St  [place] [facility] [place]  |                   |
|        |           |            | [address]  [phone]  |                   |
|        |           |            |  [phone] (Fax)  |                   |
+--------+-----------+------------+----------------------+-------------------+
| / | Hospital  | Radiology  |   Popeye Townsend, |                   |
|    | Encounter |            |  MD  [address] |                   |
|        |           |            |  St.  [place],   |                   |
|        |           |            | [place] [address]             |                   |
|        |           |            | [phone]         |                   |
|        |           |            | [phone] (Fax)   |                   |
+--------+-----------+------------+----------------------+-------------------+
| / | Surgery   | Radiology  |   Popeye Townsend, | CV EP PPM SYSTEM  |
|    |           |            |  MD  [address] | IMPLANT           |
|        |           |            |  St.  [place],   |                   |
|        |           |            | [place] [address]             |                   |
|        |           |            | [phone]         |                   |
|        |           |            | [phone] (Fax)   |                   |
+--------+-----------+------------+----------------------+-------------------+
| 02/10/ | Clinical  | Cardiology |                      |                   |
2020   | Support   |            |                      |                   |
+--------+-----------+------------+----------------------+-------------------+
| / | Office    | Cardiology |   Tonia Wilson,    |                   |
|    | Visit     |            | [facility]  [address]   |                   |
 
|        |           |            |   [place]  |                   |
|        |           |            | [address]  [phone]  |                   |
|        |           |            |  [phone] (Fax)  |                   |
+--------+-----------+------------+----------------------+-------------------+
| / | Off-Site  | Nephrology |   Marzena Moser  |                   |
2020   | Visit     |            | DO [facility]TIENNE  [address]      |                   |
|        |           |            | [address]      |                   |
|        |           |            | [place]      |                   |
|        |           |            | [address]  [phone]  |                   |
|        |           |            |  [phone] (Fax)  |                   |
+--------+-----------+------------+----------------------+-------------------+
 documented as of this encounter
 
 Procedures
 
 
+----------------------+--------+------------+----------------------+----------------------+
| Procedure Name       | Priori | Date/Time  | Associated Diagnosis | Comments             |
|                      | ty     |            |                      |                      |
+----------------------+--------+------------+----------------------+----------------------+
| EXTERNAL LAB: ALMA    | Routin | 2019 |                      |   Results for this   |
|                      | e      |            |                      | procedure are in the |
|                      |        |            |                      |  results section.    |
+----------------------+--------+------------+----------------------+----------------------+
| EXTERNAL LAB:        | Routin | 2019 |                      |   Results for this   |
| GLUCOSE              | e      |            |                      | procedure are in the |
|                      |        |            |                      |  results section.    |
+----------------------+--------+------------+----------------------+----------------------+
| EXTERNAL LAB: ALT    | Routin | 2019 |                      |   Results for this   |
|                      | e      |            |                      | procedure are in the |
|                      |        |            |                      |  results section.    |
+----------------------+--------+------------+----------------------+----------------------+
| EXTERNAL LAB: ELOY    | Jerry | 2019 |                      |   Results for this   |
|                      | e      |            |                      | procedure are in the |
|                      |        |            |                      |  results section.    |
+----------------------+--------+------------+----------------------+----------------------+
| EXTERNAL LAB:        | Routin | 2019 |                      |   Results for this   |
| ALKALINE PHOSPHATASE | e      |            |                      | procedure are in the |
|                      |        |            |                      |  results section.    |
+----------------------+--------+------------+----------------------+----------------------+
| EXTERNAL LAB:        | Routin | 2019 |                      |   Results for this   |
| BILIRUBIN, TOTAL     | e      |            |                      | procedure are in the |
|                      |        |            |                      |  results section.    |
+----------------------+--------+------------+----------------------+----------------------+
| EXTERNAL LAB:        | Routin | 2019 |                      |   Results for this   |
| ALBUMIN              | e      |            |                      | procedure are in the |
|                      |        |            |                      |  results section.    |
+----------------------+--------+------------+----------------------+----------------------+
| EXTERNAL LAB:        | Routin | 2019 |                      |   Results for this   |
| PROTEIN, TOTAL       | e      |            |                      | procedure are in the |
|                      |        |            |                      |  results section.    |
+----------------------+--------+------------+----------------------+----------------------+
| EXTERNAL LAB:        | Routin | 2019 |                      |   Results for this   |
| PHOSPHORUS           | e      |            |                      | procedure are in the |
|                      |        |            |                      |  results section.    |
+----------------------+--------+------------+----------------------+----------------------+
| EXTERNAL LAB:        | Routin | 2019 |                      |   Results for this   |
| MAGNESIUM            | e      |            |                      | procedure are in the |
|                      |        |            |                      |  results section.    |
+----------------------+--------+------------+----------------------+----------------------+
 
| EXTERNAL LAB:        | Routin | 2019 |                      |   Results for this   |
| CALCIUM              | e      |            |                      | procedure are in the |
|                      |        |            |                      |  results section.    |
+----------------------+--------+------------+----------------------+----------------------+
| EXTERNAL LAB: CARBON | Routin | 2019 |                      |   Results for this   |
|  DIOXIDE             | e      |            |                      | procedure are in the |
|                      |        |            |                      |  results section.    |
+----------------------+--------+------------+----------------------+----------------------+
| EXTERNAL LAB:        | Routin | 2019 |                      |   Results for this   |
| CHLORIDE             | e      |            |                      | procedure are in the |
|                      |        |            |                      |  results section.    |
+----------------------+--------+------------+----------------------+----------------------+
| EXTERNAL LAB:        | Routin | 2019 |                      |   Results for this   |
| POTASSIUM            | e      |            |                      | procedure are in the |
|                      |        |            |                      |  results section.    |
+----------------------+--------+------------+----------------------+----------------------+
| EXTERNAL LAB: SODIUM | Routin | 2019 |                      |   Results for this   |
|                      | e      |            |                      | procedure are in the |
|                      |        |            |                      |  results section.    |
+----------------------+--------+------------+----------------------+----------------------+
| EXTERNAL LAB: CBC    | Routin | 2019 |                      |   Results for this   |
|                      | e      |            |                      | procedure are in the |
|                      |        |            |                      |  results section.    |
+----------------------+--------+------------+----------------------+----------------------+
| EXTERNAL LAB: TSH    | Routin | 2019 |                      |   Results for this   |
|                      | e      |            |                      | procedure are in the |
|                      |        |            |                      |  results section.    |
+----------------------+--------+------------+----------------------+----------------------+
| EXTERNAL LAB:        | Routin | 2019 |                      |   Results for this   |
| TRIGLYCERIDES        | e      |            |                      | procedure are in the |
|                      |        |            |                      |  results section.    |
+----------------------+--------+------------+----------------------+----------------------+
| EXTERNAL LAB:        | Routin | 2019 |                      |   Results for this   |
| CHOLESTEROL, HDL     | e      |            |                      | procedure are in the |
|                      |        |            |                      |  results section.    |
+----------------------+--------+------------+----------------------+----------------------+
| EXTERNAL LAB:        | Routin | 2019 |                      |   Results for this   |
| CHOLESTEROL, TOTAL   | e      |            |                      | procedure are in the |
|                      |        |            |                      |  results section.    |
+----------------------+--------+------------+----------------------+----------------------+
| EXTERNAL LAB:        | Routin | 2019 |                      |   Results for this   |
| CHOLESTEROL, LDL     | e      |            |                      | procedure are in the |
|                      |        |            |                      |  results section.    |
+----------------------+--------+------------+----------------------+----------------------+
| EXTERNAL LAB: EGFR   | Routin | 2019 |                      |   Results for this   |
|                      | e      |            |                      | procedure are in the |
|                      |        |            |                      |  results section.    |
+----------------------+--------+------------+----------------------+----------------------+
| EXTERNAL LAB:        | Routin | 2019 |                      |   Results for this   |
| CREATININE           | e      |            |                      | procedure are in the |
|                      |        |            |                      |  results section.    |
+----------------------+--------+------------+----------------------+----------------------+
| HEMOGLOBIN A1C       | Routin | 2019 |                      |   Results for this   |
|                      | e      |            |                      | procedure are in the |
|                      |        |            |                      |  results section.    |
+----------------------+--------+------------+----------------------+----------------------+
 documented in this encounter
 
 Results
 Hemoglobin A1C (2019)
 
 
+-------------+-------+-----------+------------+--------------+
| Component   | Value | Ref Range | Performed  | Pathologist  |
|             |       |           | At         | Signature    |
+-------------+-------+-----------+------------+--------------+
| Hemoglobin  | 6.4   | %         | EXTERNAL   |              |
| A1c         |       |           | LAB        |              |
+-------------+-------+-----------+------------+--------------+
 
 
 
+----------+
| Specimen |
+----------+
| Blood    |
+----------+
 
 
 
+----------------+---------+--------------------+--------------+
| Performing     | Address | City/State/Zipcode | Phone Number |
| Organization   |         |                    |              |
+----------------+---------+--------------------+--------------+
|   EXTERNAL LAB |         |                    |              |
+----------------+---------+--------------------+--------------+
 External Lab: ALMA (2019)
 
+-----------+-------+-----------+------------+--------------+
| Component | Value | Ref Range | Performed  | Pathologist  |
|           |       |           | At         | Signature    |
+-----------+-------+-----------+------------+--------------+
| ALMA,      | 42    |           | EXTERNAL   |              |
| External  |       |           | LAB        |              |
+-----------+-------+-----------+------------+--------------+
 
 
 
+----------------+---------+--------------------+--------------+
| Performing     | Address | City/State/Zipcode | Phone Number |
| Organization   |         |                    |              |
+----------------+---------+--------------------+--------------+
|   EXTERNAL LAB |         |                    |              |
+----------------+---------+--------------------+--------------+
 External Lab: Glucose (2019)
 
+-----------+-------+-----------+------------+--------------+
| Component | Value | Ref Range | Performed  | Pathologist  |
|           |       |           | At         | Signature    |
+-----------+-------+-----------+------------+--------------+
| Glucose,  | 89    |           | EXTERNAL   |              |
| External  |       |           | LAB        |              |
+-----------+-------+-----------+------------+--------------+
 
 
 
+----------------+---------+--------------------+--------------+
| Performing     | Address | City/State/Zipcode | Phone Number |
| Organization   |         |                    |              |
+----------------+---------+--------------------+--------------+
|   EXTERNAL LAB |         |                    |              |
 
+----------------+---------+--------------------+--------------+
 External Lab: ALT (2019)
 
+-----------+-------+-----------+------------+--------------+
| Component | Value | Ref Range | Performed  | Pathologist  |
|           |       |           | At         | Signature    |
+-----------+-------+-----------+------------+--------------+
| ALT,      | 37    |           | EXTERNAL   |              |
| External  |       |           | LAB        |              |
+-----------+-------+-----------+------------+--------------+
 
 
 
+----------------+---------+--------------------+--------------+
| Performing     | Address | City/State/Zipcode | Phone Number |
| Organization   |         |                    |              |
+----------------+---------+--------------------+--------------+
|   EXTERNAL LAB |         |                    |              |
+----------------+---------+--------------------+--------------+
 External Lab: ELOY (2019)
 
+-----------+-------+-----------+------------+--------------+
| Component | Value | Ref Range | Performed  | Pathologist  |
|           |       |           | At         | Signature    |
+-----------+-------+-----------+------------+--------------+
| AST,      | 46    |           | EXTERNAL   |              |
| External  |       |           | LAB        |              |
+-----------+-------+-----------+------------+--------------+
 
 
 
+----------------+---------+--------------------+--------------+
| Performing     | Address | City/State/Zipcode | Phone Number |
| Organization   |         |                    |              |
+----------------+---------+--------------------+--------------+
|   EXTERNAL LAB |         |                    |              |
+----------------+---------+--------------------+--------------+
 External Lab: Alkaline Phosphatase (2019)
 
+-----------+-------+-----------+------------+--------------+
| Component | Value | Ref Range | Performed  | Pathologist  |
|           |       |           | At         | Signature    |
+-----------+-------+-----------+------------+--------------+
| ALP,      | 77    |           | EXTERNAL   |              |
| External  |       |           | LAB        |              |
+-----------+-------+-----------+------------+--------------+
 
 
 
+----------------+---------+--------------------+--------------+
| Performing     | Address | City/State/Zipcode | Phone Number |
| Organization   |         |                    |              |
+----------------+---------+--------------------+--------------+
|   EXTERNAL LAB |         |                    |              |
+----------------+---------+--------------------+--------------+
 External Lab: Bilirubin, Total (2019)
 
+-------------+-------+-----------+------------+--------------+
| Component   | Value | Ref Range | Performed  | Pathologist  |
|             |       |           | At         | Signature    |
 
+-------------+-------+-----------+------------+--------------+
| Bilirubin,  | 0.5   |           | EXTERNAL   |              |
| Total,      |       |           | LAB        |              |
| External    |       |           |            |              |
+-------------+-------+-----------+------------+--------------+
 
 
 
+----------------+---------+--------------------+--------------+
| Performing     | Address | City/State/Zipcode | Phone Number |
| Organization   |         |                    |              |
+----------------+---------+--------------------+--------------+
|   EXTERNAL LAB |         |                    |              |
+----------------+---------+--------------------+--------------+
 External Lab: Albumin (2019)
 
+-----------+-------+-----------+------------+--------------+
| Component | Value | Ref Range | Performed  | Pathologist  |
|           |       |           | At         | Signature    |
+-----------+-------+-----------+------------+--------------+
| Albumin,  | 4.0   |           | EXTERNAL   |              |
| External  |       |           | LAB        |              |
+-----------+-------+-----------+------------+--------------+
 
 
 
+----------------+---------+--------------------+--------------+
| Performing     | Address | City/State/Zipcode | Phone Number |
| Organization   |         |                    |              |
+----------------+---------+--------------------+--------------+
|   EXTERNAL LAB |         |                    |              |
+----------------+---------+--------------------+--------------+
 External Lab: Protein, Total (2019)
 
+-----------+-------+-----------+------------+--------------+
| Component | Value | Ref Range | Performed  | Pathologist  |
|           |       |           | At         | Signature    |
+-----------+-------+-----------+------------+--------------+
| Protein,  | 5.9   |           | EXTERNAL   |              |
| Total,    |       |           | LAB        |              |
| External  |       |           |            |              |
+-----------+-------+-----------+------------+--------------+
 
 
 
+----------------+---------+--------------------+--------------+
| Performing     | Address | City/State/Zipcode | Phone Number |
| Organization   |         |                    |              |
+----------------+---------+--------------------+--------------+
|   EXTERNAL LAB |         |                    |              |
+----------------+---------+--------------------+--------------+
 External Lab: Phosphorus (2019)
 
+-------------+-------+-----------+------------+--------------+
| Component   | Value | Ref Range | Performed  | Pathologist  |
|             |       |           | At         | Signature    |
+-------------+-------+-----------+------------+--------------+
| Phosphorus, | 2.9   |           | EXTERNAL   |              |
|  External   |       |           | LAB        |              |
+-------------+-------+-----------+------------+--------------+
 
 
 
 
+----------------+---------+--------------------+--------------+
| Performing     | Address | City/State/Zipcode | Phone Number |
| Organization   |         |                    |              |
+----------------+---------+--------------------+--------------+
|   EXTERNAL LAB |         |                    |              |
+----------------+---------+--------------------+--------------+
 External Lab: Magnesium (2019)
 
+-------------+-------+-----------+------------+--------------+
| Component   | Value | Ref Range | Performed  | Pathologist  |
|             |       |           | At         | Signature    |
+-------------+-------+-----------+------------+--------------+
| Magnesium,  | 2.1   |           | EXTERNAL   |              |
| External    |       |           | LAB        |              |
+-------------+-------+-----------+------------+--------------+
 
 
 
+----------------+---------+--------------------+--------------+
| Performing     | Address | City/State/Zipcode | Phone Number |
| Organization   |         |                    |              |
+----------------+---------+--------------------+--------------+
|   EXTERNAL LAB |         |                    |              |
+----------------+---------+--------------------+--------------+
 External Lab: Calcium (2019)
 
+-----------+-------+-----------+------------+--------------+
| Component | Value | Ref Range | Performed  | Pathologist  |
|           |       |           | At         | Signature    |
+-----------+-------+-----------+------------+--------------+
| Calcium,  | 10.1  |           | EXTERNAL   |              |
| External  |       |           | LAB        |              |
+-----------+-------+-----------+------------+--------------+
 
 
 
+----------------+---------+--------------------+--------------+
| Performing     | Address | City/State/Zipcode | Phone Number |
| Organization   |         |                    |              |
+----------------+---------+--------------------+--------------+
|   EXTERNAL LAB |         |                    |              |
+----------------+---------+--------------------+--------------+
 External Lab: Carbon Dioxide (2019)
 
+-----------+-------+-----------+------------+--------------+
| Component | Value | Ref Range | Performed  | Pathologist  |
|           |       |           | At         | Signature    |
+-----------+-------+-----------+------------+--------------+
| Carbon    | 22    |           | EXTERNAL   |              |
| Dioxide,  |       |           | LAB        |              |
| External  |       |           |            |              |
+-----------+-------+-----------+------------+--------------+
 
 
 
+----------------+---------+--------------------+--------------+
| Performing     | Address | City/State/Zipcode | Phone Number |
 
| Organization   |         |                    |              |
+----------------+---------+--------------------+--------------+
|   EXTERNAL LAB |         |                    |              |
+----------------+---------+--------------------+--------------+
 External Lab: Chloride (2019)
 
+------------+-------+-----------+------------+--------------+
| Component  | Value | Ref Range | Performed  | Pathologist  |
|            |       |           | At         | Signature    |
+------------+-------+-----------+------------+--------------+
| Chloride,  | 110   |           | EXTERNAL   |              |
| External   |       |           | LAB        |              |
+------------+-------+-----------+------------+--------------+
 
 
 
+----------------+---------+--------------------+--------------+
| Performing     | Address | City/State/Zipcode | Phone Number |
| Organization   |         |                    |              |
+----------------+---------+--------------------+--------------+
|   EXTERNAL LAB |         |                    |              |
+----------------+---------+--------------------+--------------+
 External Lab: Potassium (2019)
 
+-------------+-------+-----------+------------+--------------+
| Component   | Value | Ref Range | Performed  | Pathologist  |
|             |       |           | At         | Signature    |
+-------------+-------+-----------+------------+--------------+
| Potassium,  | 5.1   |           | EXTERNAL   |              |
| External    |       |           | LAB        |              |
+-------------+-------+-----------+------------+--------------+
 
 
 
+----------------+---------+--------------------+--------------+
| Performing     | Address | City/State/Zipcode | Phone Number |
| Organization   |         |                    |              |
+----------------+---------+--------------------+--------------+
|   EXTERNAL LAB |         |                    |              |
+----------------+---------+--------------------+--------------+
 External Lab: Sodium (2019)
 
+-----------+-------+-----------+------------+--------------+
| Component | Value | Ref Range | Performed  | Pathologist  |
|           |       |           | At         | Signature    |
+-----------+-------+-----------+------------+--------------+
| Sodium,   | 144   |           | EXTERNAL   |              |
| External  |       |           | LAB        |              |
+-----------+-------+-----------+------------+--------------+
 
 
 
+----------------+---------+--------------------+--------------+
| Performing     | Address | City/State/Zipcode | Phone Number |
| Organization   |         |                    |              |
+----------------+---------+--------------------+--------------+
|   EXTERNAL LAB |         |                    |              |
+----------------+---------+--------------------+--------------+
 External Lab: CBC (2019)
 
 
+-----------+-------+-----------+------------+--------------+
| Component | Value | Ref Range | Performed  | Pathologist  |
|           |       |           | At         | Signature    |
+-----------+-------+-----------+------------+--------------+
| WBC,      | 5.6   |           | EXTERNAL   |              |
| External  |       |           | LAB        |              |
+-----------+-------+-----------+------------+--------------+
| HGB,      | 13.9  |           | EXTERNAL   |              |
| External  |       |           | LAB        |              |
+-----------+-------+-----------+------------+--------------+
| HCT,      | 42.6  |           | EXTERNAL   |              |
| External  |       |           | LAB        |              |
+-----------+-------+-----------+------------+--------------+
| PLT,      | 176   |           | EXTERNAL   |              |
| External  |       |           | LAB        |              |
+-----------+-------+-----------+------------+--------------+
| RBC,      | 4.07  |           | EXTERNAL   |              |
| External  |       |           | LAB        |              |
+-----------+-------+-----------+------------+--------------+
| MCV,      | 105   |           | EXTERNAL   |              |
| External  |       |           | LAB        |              |
+-----------+-------+-----------+------------+--------------+
| RDW,      | 13.8  |           | EXTERNAL   |              |
| External  |       |           | LAB        |              |
+-----------+-------+-----------+------------+--------------+
 
 
 
+----------------+---------+--------------------+--------------+
| Performing     | Address | City/State/Zipcode | Phone Number |
| Organization   |         |                    |              |
+----------------+---------+--------------------+--------------+
|   EXTERNAL LAB |         |                    |              |
+----------------+---------+--------------------+--------------+
 External Lab: TSH (2019)
 
+-----------+-------+-----------+------------+--------------+
| Component | Value | Ref Range | Performed  | Pathologist  |
|           |       |           | At         | Signature    |
+-----------+-------+-----------+------------+--------------+
| TSH,      | 1.85  |           | EXTERNAL   |              |
| External  |       |           | LAB        |              |
+-----------+-------+-----------+------------+--------------+
 
 
 
+----------+
| Specimen |
+----------+
| Blood    |
+----------+
 
 
 
+----------------+---------+--------------------+--------------+
| Performing     | Address | City/State/Zipcode | Phone Number |
| Organization   |         |                    |              |
+----------------+---------+--------------------+--------------+
|   EXTERNAL LAB |         |                    |              |
+----------------+---------+--------------------+--------------+
 
 External Lab: Triglycerides (2019)
 
+-------------+-------+-----------+------------+--------------+
| Component   | Value | Ref Range | Performed  | Pathologist  |
|             |       |           | At         | Signature    |
+-------------+-------+-----------+------------+--------------+
| Triglycerid | 115   |           | EXTERNAL   |              |
| es,         |       |           | LAB        |              |
| External    |       |           |            |              |
+-------------+-------+-----------+------------+--------------+
 
 
 
+----------+
| Specimen |
+----------+
| Blood    |
+----------+
 
 
 
+----------------+---------+--------------------+--------------+
| Performing     | Address | City/State/Zipcode | Phone Number |
| Organization   |         |                    |              |
+----------------+---------+--------------------+--------------+
|   EXTERNAL LAB |         |                    |              |
+----------------+---------+--------------------+--------------+
 External Lab: Cholesterol, HDL (2019)
 
+-------------+-------+-----------+------------+--------------+
| Component   | Value | Ref Range | Performed  | Pathologist  |
|             |       |           | At         | Signature    |
+-------------+-------+-----------+------------+--------------+
| HDL         | 51    | mg/dl     | EXTERNAL   |              |
| Cholesterol |       |           | LAB        |              |
| , External  |       |           |            |              |
+-------------+-------+-----------+------------+--------------+
 
 
 
+----------+
| Specimen |
+----------+
| Blood    |
+----------+
 
 
 
+----------------+---------+--------------------+--------------+
| Performing     | Address | City/State/Zipcode | Phone Number |
| Organization   |         |                    |              |
+----------------+---------+--------------------+--------------+
|   EXTERNAL LAB |         |                    |              |
+----------------+---------+--------------------+--------------+
 External Lab: Cholesterol, Total (2019)
 
+-------------+-------+-----------+------------+--------------+
| Component   | Value | Ref Range | Performed  | Pathologist  |
|             |       |           | At         | Signature    |
+-------------+-------+-----------+------------+--------------+
 
| Cholesterol | 125   | mg/dl     | EXTERNAL   |              |
| , Total,    |       |           | LAB        |              |
| External    |       |           |            |              |
+-------------+-------+-----------+------------+--------------+
 
 
 
+----------+
| Specimen |
+----------+
| Blood    |
+----------+
 
 
 
+----------------+---------+--------------------+--------------+
| Performing     | Address | City/State/Zipcode | Phone Number |
| Organization   |         |                    |              |
+----------------+---------+--------------------+--------------+
|   EXTERNAL LAB |         |                    |              |
+----------------+---------+--------------------+--------------+
 External Lab: Cholesterol, LDL (2019)
 
+-------------+-------+-----------+------------+--------------+
| Component   | Value | Ref Range | Performed  | Pathologist  |
|             |       |           | At         | Signature    |
+-------------+-------+-----------+------------+--------------+
| LDL         | 51    |           | EXTERNAL   |              |
| Cholesterol |       |           | LAB        |              |
| , Direct,   |       |           |            |              |
| External    |       |           |            |              |
+-------------+-------+-----------+------------+--------------+
 
 
 
+----------+
| Specimen |
+----------+
| Blood    |
+----------+
 
 
 
+----------------+---------+--------------------+--------------+
| Performing     | Address | City/State/Zipcode | Phone Number |
| Organization   |         |                    |              |
+----------------+---------+--------------------+--------------+
|   EXTERNAL LAB |         |                    |              |
+----------------+---------+--------------------+--------------+
 External Lab: eGFR (2019)
 
+-----------+-------+-----------+------------+--------------+
| Component | Value | Ref Range | Performed  | Pathologist  |
|           |       |           | At         | Signature    |
+-----------+-------+-----------+------------+--------------+
| eGFR,     | 35    |           | EXTERNAL   |              |
| External  |       |           | LAB        |              |
+-----------+-------+-----------+------------+--------------+
 
 
 
 
+----------+
| Specimen |
+----------+
| Blood    |
+----------+
 
 
 
+----------------+---------+--------------------+--------------+
| Performing     | Address | City/State/Zipcode | Phone Number |
| Organization   |         |                    |              |
+----------------+---------+--------------------+--------------+
|   EXTERNAL LAB |         |                    |              |
+----------------+---------+--------------------+--------------+
 External Lab: Creatinine (2019)
 
+-------------+-------+-----------+------------+--------------+
| Component   | Value | Ref Range | Performed  | Pathologist  |
|             |       |           | At         | Signature    |
+-------------+-------+-----------+------------+--------------+
| Creatinine, | 1.47  |           | EXTERNAL   |              |
|  External   |       |           | LAB        |              |
+-------------+-------+-----------+------------+--------------+
 
 
 
+----------+
| Specimen |
+----------+
| Blood    |
+----------+
 
 
 
+----------------+---------+--------------------+--------------+
| Performing     | Address | City/State/Zipcode | Phone Number |
| Organization   |         |                    |              |
+----------------+---------+--------------------+--------------+
|   EXTERNAL LAB |         |                    |              |
+----------------+---------+--------------------+--------------+
 documented in this encounter
 
 Visit Diagnoses
 Not on filedocumented in this encounter

## 2020-01-08 NOTE — XMS
Encounter Summary
  Created on: 2020
 
 Viviana Ricci
 External Reference #: [id number]
 : 47
 Sex: Female
 
 Demographics
 
 
+-----------------------+----------------------+
| Address               | [address]      |
|                       | [place]  [address] |
+-----------------------+----------------------+
| Home Phone            | [phone]      |
+-----------------------+----------------------+
| Preferred Language    | Unknown              |
+-----------------------+----------------------+
| Marital Status        | Single               |
+-----------------------+----------------------+
| Orthodox Affiliation | 1009                 |
+-----------------------+----------------------+
| Race                  | Unknown              |
+-----------------------+----------------------+
| Ethnic Group          | Unknown              |
+-----------------------+----------------------+
 
 
 Author
 
 
+--------------+--------------------------------------------+
| Author       | [facility]  |
|              | and [place]                                |
+--------------+--------------------------------------------+
| Organization | [facility]  |
|              | and [place]                                |
+--------------+--------------------------------------------+
| Address      | Unknown                                    |
+--------------+--------------------------------------------+
| Phone        | Unavailable                                |
+--------------+--------------------------------------------+
 
 
 
 Support
 
 
+----------------+--------------+---------------------+-----------------+
| Name           | Relationship | Address             | Phone           |
+----------------+--------------+---------------------+-----------------+
| Ada/Ed Radhames | ECON         | [address]              | [phone] |
|                |              | [place]  |                 |
|                |              |  [address]              |                 |
+----------------+--------------+---------------------+-----------------+
 
 
 
 
 Care Team Providers
 
 
+------------------------+------+-----------------+
| Care Team Member Name  | Role | Phone           |
+------------------------+------+-----------------+
| Marzena Moser DO | PCP  | [phone] |
+------------------------+------+-----------------+
 
 
 
 Reason for Visit
 
 
+-------------------+----------+
| Reason            | Comments |
+-------------------+----------+
| Medication Refill |          |
+-------------------+----------+
 
 
 
 Encounter Details
 
 
+--------+--------+---------------------+----------------------+-------------------+
| Date   | Type   | Department          | Care Team            | Description       |
+--------+--------+---------------------+----------------------+-------------------+
| / | Refill |   [facility]         |   Marzena Moser  | Medication Refill |
| 2017   |        | NEPHROLOGY  [address]   | M, DO  [address]      |                   |
|        |        | [address]   | [address]      |                   |
|        |        | [place], [place]     | [place], [place]      |                   |
|        |        | [address]          | [address]  [phone]  |                   |
|        |        | [phone]        |  [phone] (Fax)  |                   |
+--------+--------+---------------------+----------------------+-------------------+
 
 
 
 Social History
 
 
+--------------+-------+-----------+--------+------+
| Tobacco Use  | Types | Packs/Day | Years  | Date |
|              |       |           | Used   |      |
+--------------+-------+-----------+--------+------+
| Never Smoker |       |           |        |      |
+--------------+-------+-----------+--------+------+
 
 
 
+---------------------+---+---+---+
| Smokeless Tobacco:  |   |   |   |
| Never Used          |   |   |   |
+---------------------+---+---+---+
 
 
 
+---------------------------------------------------------------+
| Comments: some second hand smoke exposure, but fairly minimal |
 
+---------------------------------------------------------------+
 
 
 
+-------------+-------------+---------+----------+
| Alcohol Use | Drinks/Week | oz/Week | Comments |
+-------------+-------------+---------+----------+
| No          |             |         |          |
+-------------+-------------+---------+----------+
 
 
 
+------------------+---------------+
| Sex Assigned at  | Date Recorded |
| Birth            |               |
+------------------+---------------+
| Not on file      |               |
+------------------+---------------+
 
 
 
+----------------+-------------+-------------+
| Job Start Date | Occupation  | Industry    |
+----------------+-------------+-------------+
| Not on file    | Not on file | Not on file |
+----------------+-------------+-------------+
 
 
 
+----------------+--------------+------------+
| Travel History | Travel Start | Travel End |
+----------------+--------------+------------+
 
 
 
+-------------------------------------+
| No recent travel history available. |
+-------------------------------------+
 documented as of this encounter
 
 Plan of Treatment
 
 
+--------+-----------+------------+----------------------+-------------------+
| Date   | Type      | Specialty  | Care Team            | Description       |
+--------+-----------+------------+----------------------+-------------------+
| / | Office    | Cardiology |   Tonia Wilson,    |                   |
|    | Visit     |            | ARNP  [address]   |                   |
|        |           |            | St [place], [place]  |                   |
|        |           |            | [address]  [phone]  |                   |
|        |           |            |  [phone] (Fax)  |                   |
+--------+-----------+------------+----------------------+-------------------+
| / | Hospital  | Radiology  |   Popeye Townsend, |                   |
|    | Encounter |            |  MD  [address] |                   |
|        |           |            |  [place],   |                   |
|        |           |            | [place] [address]             |                   |
|        |           |            | [phone]         |                   |
|        |           |            | [phone] (Fax)   |                   |
+--------+-----------+------------+----------------------+-------------------+
| / | Surgery   | Radiology  |   Popeye Townsend, | CV EP PPM SYSTEM  |
 
|    |           |            |  MD  [address] | IMPLANT           |
|        |           |            |  [place],   |                   |
|        |           |            | [place] [address]             |                   |
|        |           |            | [phone]         |                   |
|        |           |            | [phone] (Fax)   |                   |
+--------+-----------+------------+----------------------+-------------------+
| 02/10/ | Clinical  | Cardiology |                      |                   |
|    | Support   |            |                      |                   |
+--------+-----------+------------+----------------------+-------------------+
| / | Office    | Cardiology |   RakeshTonia andre,    |                   |
|    | Visit     |            | ARNP  [address]   |                   |
|        |           |            | [place]  |                   |
|        |           |            | [address] [phone]  |                   |
|        |           |            |  [phone] (Fax)  |                   |
+--------+-----------+------------+----------------------+-------------------+
| / | Off-Site  | Nephrology |   Marzena Moser  |                   |
|    | Visit     |            | DO ETIENNE  [address] [place] [address]      |                   |
|        |           |            | [address]      |                   |
|        |           |            | [place]      |                   |
|        |           |            | [address] [phone]  |                   |
|        |           |            |  [phone] (Fax)  |                   |
+--------+-----------+------------+----------------------+-------------------+
 documented as of this encounter
 
 Visit Diagnoses
 Not on filedocumented in this encounter

## 2020-01-08 NOTE — XMS
Encounter Summary
  Created on: 2020
 
 Viviana Ricci
 External Reference #: [id number]
 : 47
 Sex: Female
 
 Demographics
 
 
+-----------------------+----------------------+
| Address               | [address]      |
|                       | [place]  [address] |
+-----------------------+----------------------+
| Home Phone            | [phone]      |
+-----------------------+----------------------+
| Preferred Language    | Unknown              |
+-----------------------+----------------------+
| Marital Status        | Single               |
+-----------------------+----------------------+
| Lutheran Affiliation | 1009                 |
+-----------------------+----------------------+
| Race                  | Unknown              |
+-----------------------+----------------------+
| Ethnic Group          | Unknown              |
+-----------------------+----------------------+
 
 
 Author
 
 
+--------------+--------------------------------------------+
| Author       | [facility]  |
|              | and [place]                                |
+--------------+--------------------------------------------+
| Organization | [facility]  |
|              | and [place]                                |
+--------------+--------------------------------------------+
| Address      | Unknown                                    |
+--------------+--------------------------------------------+
| Phone        | Unavailable                                |
+--------------+--------------------------------------------+
 
 
 
 Support
 
 
+----------------+--------------+---------------------+-----------------+
| Name           | Relationship | Address             | Phone           |
+----------------+--------------+---------------------+-----------------+
| Ada/Ed Radhames | ECON         | [address]              | [phone] |
|                |              | [place]  |                 |
|                |              |  [address]              |                 |
+----------------+--------------+---------------------+-----------------+
 
 
 
 
 Care Team Providers
 
 
+-----------------------+------+-------------+
| Care Team Member Name | Role | Phone       |
+-----------------------+------+-------------+
 PCP  | Unavailable |
+-----------------------+------+-------------+
 
 
 
 Reason for Visit
 
 
+-------------+------------+
| Reason      | Comments   |
+-------------+------------+
| Appointment | NOV recall |
+-------------+------------+
 
 
 
 Encounter Details
 
 
+--------+-----------+----------------------+----------------------+-------------------+
| Date   | Type      | Department           | Care Team            | Description       |
+--------+-----------+----------------------+----------------------+-------------------+
| / | Telephone |   [facility] [place]          |   Tonia Wilson,    | Appointment (NOV  |
|    |           | CARDIOLOGY  [address]    | ARNP  [address]   | recall)           |
|        |           | [address] | St  [place] [facility], [place]  |                   |
|        |           |  [place] [address]       | [address]  [phone]  |                   |
|        |           | [phone]         |  [phone] (Fax)  |                   |
+--------+-----------+----------------------+----------------------+-------------------+
 
 
 
 Social History
 
 
+--------------+-------+-----------+--------+------+
| Tobacco Use  | Types | Packs/Day | Years  | Date |
|              |       |           | Used   |      |
+--------------+-------+-----------+--------+------+
| Never Smoker |       |           |        |      |
+--------------+-------+-----------+--------+------+
 
 
 
+---------------------+---+---+---+
| Smokeless Tobacco:  |   |   |   |
| Never Used          |   |   |   |
+---------------------+---+---+---+
 
 
 
+---------------------------------------------------------------+
| Comments: some second hand smoke exposure, but fairly minimal |
+---------------------------------------------------------------+
 
 
 
 
+-------------+-------------+---------+----------+
| Alcohol Use | Drinks/Week | oz/Week | Comments |
+-------------+-------------+---------+----------+
| No          |             |         |          |
+-------------+-------------+---------+----------+
 
 
 
+------------------+---------------+
| Sex Assigned at  | Date Recorded |
| Birth            |               |
+------------------+---------------+
| Not on file      |               |
+------------------+---------------+
 
 
 
+----------------+-------------+-------------+
| Job Start Date | Occupation  | Industry    |
+----------------+-------------+-------------+
| Not on file    | Not on file | Not on file |
+----------------+-------------+-------------+
 
 
 
+----------------+--------------+------------+
| Travel History | Travel Start | Travel End |
+----------------+--------------+------------+
 
 
 
+-------------------------------------+
| No recent travel history available. |
+-------------------------------------+
 documented as of this encounter
 
 Plan of Treatment
 
 
+--------+-----------+------------+----------------------+-------------------+
| Date   | Type      | Specialty  | Care Team            | Description       |
+--------+-----------+------------+----------------------+-------------------+
| / | Office    | Cardiology |   Tonia Wilson,    |                   |
|    | Visit     |            | ARNJESSICA  [address]   |                   |
|        |           |            | St  [place] [facility], [place]  |                   |
|        |           |            | [address]  [phone]  |                   |
|        |           |            |  [phone] (Fax)  |                   |
+--------+-----------+------------+----------------------+-------------------+
| / | Hospital  | Radiology  |   Popeye Townsend, |                   |
|    | Encounter |            |  MD  [address] |                   |
|        |           |            |  [place],   |                   |
|        |           |            | [place] [address]             |                   |
|        |           |            | [phone]         |                   |
|        |           |            | [phone] (Fax)   |                   |
+--------+-----------+------------+----------------------+-------------------+
| / | Surgery   | Radiology  |   Popeye Townsend, | CV EP PPM SYSTEM  |
|    |           |            |  MD  [address] | IMPLANT           |
 
|        |           |            |  [place],   |                   |
|        |           |            | [place] [address]             |                   |
|        |           |            | [phone]         |                   |
|        |           |            | [phone] (Fax)   |                   |
+--------+-----------+------------+----------------------+-------------------+
| 02/10/ | Clinical  | Cardiology |                      |                   |
|    | Support   |            |                      |                   |
+--------+-----------+------------+----------------------+-------------------+
| / | Office    | Cardiology |   Tonia Wilson,    |                   |
|    | Visit     |            | BELKIS  [address]   |                   |
|        |           |            | [place]  |                   |
|        |           |            | [address]  [phone]  |                   |
|        |           |            |  [phone] (Fax)  |                   |
+--------+-----------+------------+----------------------+-------------------+
| / | Off-Site  | Nephrology |   Marzena Moser  |                   |
|    | Visit     |            | DO ETIENNE  [address] [place] [address]      |                   |
|        |           |            | [address]      |                   |
|        |           |            | [place]      |                   |
|        |           |            | [address] [phone]  |                   |
|        |           |            |  [phone] (Fax)  |                   |
+--------+-----------+------------+----------------------+-------------------+
 documented as of this encounter
 
 Visit Diagnoses
 Not on filedocumented in this encounter

## 2020-01-08 NOTE — XMS
Encounter Summary
  Created on: 2020
 
 Viviana Ricci
 External Reference #: [id number]
 : 47
 Sex: Female
 
 Demographics
 
 
+-----------------------+----------------------+
| Address               | [address]      |
|                       | [place]  [address] |
+-----------------------+----------------------+
| Home Phone            | [phone]      |
+-----------------------+----------------------+
| Preferred Language    | Unknown              |
+-----------------------+----------------------+
| Marital Status        | Single               |
+-----------------------+----------------------+
| Synagogue Affiliation | 1009                 |
+-----------------------+----------------------+
| Race                  | Unknown              |
+-----------------------+----------------------+
| Ethnic Group          | Unknown              |
+-----------------------+----------------------+
 
 
 Author
 
 
+--------------+--------------------------------------------+
| Author       | [facility]  |
|              | and [place]                                |
+--------------+--------------------------------------------+
| Organization | [facility]  |
|              | and [place]                                |
+--------------+--------------------------------------------+
| Address      | Unknown                                    |
+--------------+--------------------------------------------+
| Phone        | Unavailable                                |
+--------------+--------------------------------------------+
 
 
 
 Support
 
 
+----------------+--------------+---------------------+-----------------+
| Name           | Relationship | Address             | Phone           |
+----------------+--------------+---------------------+-----------------+
| Ada/Ed Radhames | ECON         | [place]              | [phone] |
|                |              | [place]  |                 |
|                |              |  [address]              |                 |
+----------------+--------------+---------------------+-----------------+
 
 
 
 
 Care Team Providers
 
 
+-----------------------+------+-------------+
| Care Team Member Name | Role | Phone       |
+-----------------------+------+-------------+
 PCP  | Unavailable |
+-----------------------+------+-------------+
 
 
 
 Reason for Visit
 
 
+-------------------+----------+
| Reason            | Comments |
+-------------------+----------+
| Medication Refill |          |
+-------------------+----------+
 
 
 
 Encounter Details
 
 
+--------+--------+---------------------+----------------------+-------------------+
| Date   | Type   | Department          | Care Team            | Description       |
+--------+--------+---------------------+----------------------+-------------------+
| / | Refill |   [facility]         |   Marzena Moser  | Medication Refill |
| 2018   |        | NEPHROLOGY  [address]   | M, DO  [address]      |                   |
|        |        | [address]   | [address]      |                   |
|        |        | [place], [place]     | [place], [place]      |                   |
|        |        | [address]          | [address]  [phone]  |                   |
|        |        | [phone]        |  [phone] (Fax)  |                   |
+--------+--------+---------------------+----------------------+-------------------+
 
 
 
 Social History
 
 
+--------------+-------+-----------+--------+------+
| Tobacco Use  | Types | Packs/Day | Years  | Date |
|              |       |           | Used   |      |
+--------------+-------+-----------+--------+------+
| Never Smoker |       |           |        |      |
+--------------+-------+-----------+--------+------+
 
 
 
+---------------------+---+---+---+
| Smokeless Tobacco:  |   |   |   |
| Never Used          |   |   |   |
+---------------------+---+---+---+
 
 
 
+---------------------------------------------------------------+
| Comments: some second hand smoke exposure, but fairly minimal |
 
+---------------------------------------------------------------+
 
 
 
+-------------+-------------+---------+----------+
| Alcohol Use | Drinks/Week | oz/Week | Comments |
+-------------+-------------+---------+----------+
| No          |             |         |          |
+-------------+-------------+---------+----------+
 
 
 
+------------------+---------------+
| Sex Assigned at  | Date Recorded |
| Birth            |               |
+------------------+---------------+
| Not on file      |               |
+------------------+---------------+
 
 
 
+----------------+-------------+-------------+
| Job Start Date | Occupation  | Industry    |
+----------------+-------------+-------------+
| Not on file    | Not on file | Not on file |
+----------------+-------------+-------------+
 
 
 
+----------------+--------------+------------+
| Travel History | Travel Start | Travel End |
+----------------+--------------+------------+
 
 
 
+-------------------------------------+
| No recent travel history available. |
+-------------------------------------+
 documented as of this encounter
 
 Plan of Treatment
 
 
+--------+-----------+------------+----------------------+-------------------+
| Date   | Type      | Specialty  | Care Team            | Description       |
+--------+-----------+------------+----------------------+-------------------+
| / | Office    | Cardiology |   HellalfredoTonia,    |                   |
|    | Visit     |            | ARNP  [address]   |                   |
|        |           |            | St  [place], [place]  |                   |
|        |           |            | [address]  [phone]  |                   |
|        |           |            |  [phone] (Fax)  |                   |
+--------+-----------+------------+----------------------+-------------------+
| / | Hospital  | Radiology  |   Popeye Townsend, |                   |
|    | Encounter |            |  MD  [address] |                   |
|        |           |            |  St.  [place],   |                   |
|        |           |            | [place] [address]             |                   |
|        |           |            | [phone]         |                   |
|        |           |            | [phone] (Fax)   |                   |
+--------+-----------+------------+----------------------+-------------------+
| / | Surgery   | Radiology  |   Popeye Townsend, | CV EP PPM SYSTEM  |
 
|    |           |            |  MD  [address] | IMPLANT           |
|        |           |            |  St.  [place],   |                   |
|        |           |            | [place] [address]             |                   |
|        |           |            | [phone]         |                   |
|        |           |            | [phone] (Fax)   |                   |
+--------+-----------+------------+----------------------+-------------------+
| 02/10/ | Clinical  | Cardiology |                      |                   |
|    | Support   |            |                      |                   |
+--------+-----------+------------+----------------------+-------------------+
| / | Office    | Cardiology |   Tonia Wilson,    |                   |
|    | Visit     |            | ARNP  [address]   |                   |
|        |           |            | [place]  |                   |
|        |           |            | [address]  [phone]  |                   |
|        |           |            |  [phone] (Fax)  |                   |
+--------+-----------+------------+----------------------+-------------------+
| / | Off-Site  | Nephrology |   Marzena Moser  |                   |
|    | Visit     |            | DO ETIENNE  [address] [address]      |                   |
|        |           |            | [address]      |                   |
|        |           |            | [place]      |                   |
|        |           |            | [address]  [phone]  |                   |
|        |           |            |  [phone] (Fax)  |                   |
+--------+-----------+------------+----------------------+-------------------+
 documented as of this encounter
 
 Visit Diagnoses
 Not on filedocumented in this encounter

## 2020-01-08 NOTE — XMS
Encounter Summary
  Created on: 2020
 
 Viviana Ricci
 External Reference #: [id number]
 : 47
 Sex: Female
 
 Demographics
 
 
+-----------------------+----------------------+
| Address               | [address]      |
|                       | [place]  [address] |
+-----------------------+----------------------+
| Home Phone            | [phone]      |
+-----------------------+----------------------+
| Preferred Language    | Unknown              |
+-----------------------+----------------------+
| Marital Status        | Single               |
+-----------------------+----------------------+
| Jehovah's witness Affiliation | 1009                 |
+-----------------------+----------------------+
| Race                  | Unknown              |
+-----------------------+----------------------+
| Ethnic Group          | Unknown              |
+-----------------------+----------------------+
 
 
 Author
 
 
+--------------+--------------------------------------------+
| Author       | [facility]  |
|              | and [place]                                |
+--------------+--------------------------------------------+
| Organization | [facility]  |
|              | and [place]                                |
+--------------+--------------------------------------------+
| Address      | Unknown                                    |
+--------------+--------------------------------------------+
| Phone        | Unavailable                                |
+--------------+--------------------------------------------+
 
 
 
 Support
 
 
+----------------+--------------+---------------------+-----------------+
| Name           | Relationship | Address             | Phone           |
+----------------+--------------+---------------------+-----------------+
| Ada/Ed Radhames | ECON         | [address]              | [phone] |
|                |              | [place]  |                 |
|                |              |  [address]              |                 |
+----------------+--------------+---------------------+-----------------+
 
 
 
 
 Care Team Providers
 
 
+------------------------+------+-----------------+
| Care Team Member Name  | Role | Phone           |
+------------------------+------+-----------------+
| Marzena Moser DO | PCP  | [phone] |
+------------------------+------+-----------------+
 
 
 
 Reason for Visit
 
 
+-------------------+----------+
| Reason            | Comments |
+-------------------+----------+
| Medication Refill |          |
+-------------------+----------+
 
 
 
 Encounter Details
 
 
+--------+--------+---------------------+----------------------+-------------------+
| Date   | Type   | Department          | Care Team            | Description       |
+--------+--------+---------------------+----------------------+-------------------+
| 10/02/ | Refill |   [place]         |   Marzena Moser  | Medication Refill |
| 2018   |        | NEPHROLOGY  [address]   | M, DO  [address]      |                   |
|        |        | [address]   | [address]      |                   |
|        |        | [place], [place]     | [place], [place]      |                   |
|        |        | [address]          | [address]  [phone]  |                   |
|        |        | [phone]        |  [phone] (Fax)  |                   |
+--------+--------+---------------------+----------------------+-------------------+
 
 
 
 Social History
 
 
+--------------+-------+-----------+--------+------+
| Tobacco Use  | Types | Packs/Day | Years  | Date |
|              |       |           | Used   |      |
+--------------+-------+-----------+--------+------+
| Never Smoker |       |           |        |      |
+--------------+-------+-----------+--------+------+
 
 
 
+---------------------+---+---+---+
| Smokeless Tobacco:  |   |   |   |
| Never Used          |   |   |   |
+---------------------+---+---+---+
 
 
 
+---------------------------------------------------------------+
| Comments: some second hand smoke exposure, but fairly minimal |
 
+---------------------------------------------------------------+
 
 
 
+-------------+-------------+---------+----------+
| Alcohol Use | Drinks/Week | oz/Week | Comments |
+-------------+-------------+---------+----------+
| No          |             |         |          |
+-------------+-------------+---------+----------+
 
 
 
+------------------+---------------+
| Sex Assigned at  | Date Recorded |
| Birth            |               |
+------------------+---------------+
| Not on file      |               |
+------------------+---------------+
 
 
 
+----------------+-------------+-------------+
| Job Start Date | Occupation  | Industry    |
+----------------+-------------+-------------+
| Not on file    | Not on file | Not on file |
+----------------+-------------+-------------+
 
 
 
+----------------+--------------+------------+
| Travel History | Travel Start | Travel End |
+----------------+--------------+------------+
 
 
 
+-------------------------------------+
| No recent travel history available. |
+-------------------------------------+
 documented as of this encounter
 
 Plan of Treatment
 
 
+--------+-----------+------------+----------------------+-------------------+
| Date   | Type      | Specialty  | Care Team            | Description       |
+--------+-----------+------------+----------------------+-------------------+
| / | Office    | Cardiology |   Tonia Wilson,    |                   |
|    | Visit     |            | ARNP  [address]   |                   |
|        |           |            | St [place], [place]  |                   |
|        |           |            | [address]  [phone]  |                   |
|        |           |            |  [phone] (Fax)  |                   |
+--------+-----------+------------+----------------------+-------------------+
| / | Hospital  | Radiology  |   Popeye Townsend, |                   |
|    | Encounter |            |  MD  [address] |                   |
|        |           |            |  [place],   |                   |
|        |           |            | [place] [address]             |                   |
|        |           |            | [phone]         |                   |
|        |           |            | [phone] (Fax)   |                   |
+--------+-----------+------------+----------------------+-------------------+
| / | Surgery   | Radiology  |   Popeye Townsend, | CV EP PPM SYSTEM  |
 
|    |           |            |  MD  [address] | IMPLANT           |
|        |           |            |  [place],   |                   |
|        |           |            | [place] [address]             |                   |
|        |           |            | [phone]         |                   |
|        |           |            | [phone] (Fax)   |                   |
+--------+-----------+------------+----------------------+-------------------+
| 02/10/ | Clinical  | Cardiology |                      |                   |
|    | Support   |            |                      |                   |
+--------+-----------+------------+----------------------+-------------------+
| / | Office    | Cardiology |   RakeshTonia andre,    |                   |
|    | Visit     |            | ARNP  [address]   |                   |
|        |           |            | [place]  |                   |
|        |           |            | [address] [phone]  |                   |
|        |           |            |  [phone] (Fax)  |                   |
+--------+-----------+------------+----------------------+-------------------+
| / | Off-Site  | Nephrology |   Marzena Moser  |                   |
|    | Visit     |            | DO ETIENNE  [address] [place] [address]      |                   |
|        |           |            | [address]      |                   |
|        |           |            | [place]      |                   |
|        |           |            | [address] [phone]  |                   |
|        |           |            |  [phone] (Fax)  |                   |
+--------+-----------+------------+----------------------+-------------------+
 documented as of this encounter
 
 Visit Diagnoses
 Not on filedocumented in this encounter

## 2020-01-08 NOTE — XMS
Encounter Summary
  Created on: 2020
 
 Viviana Ricci
 External Reference #: [id number]
 : 47
 Sex: Female
 
 Demographics
 
 
+-----------------------+----------------------+
| Address               | [address]      |
|                       | [place]  [address] |
+-----------------------+----------------------+
| Home Phone            | [phone]      |
+-----------------------+----------------------+
| Preferred Language    | Unknown              |
+-----------------------+----------------------+
| Marital Status        | Single               |
+-----------------------+----------------------+
| [employer] Affiliation | [id number]                 |
+-----------------------+----------------------+
| Race                  | Unknown              |
+-----------------------+----------------------+
| Ethnic Group          | Unknown              |
+-----------------------+----------------------+
 
 
 Author
 
 
+--------------+--------------------------------------------+
| Author       | [facility]  |
|              | and [facility]                                |
+--------------+--------------------------------------------+
| Organization | [facility]  |
|              | and [facility]                                |
+--------------+--------------------------------------------+
| Address      | Unknown                                    |
+--------------+--------------------------------------------+
| Phone        | Unavailable                                |
+--------------+--------------------------------------------+
 
 
 
 Support
 
 
+----------------+--------------+---------------------+-----------------+
| Name           | Relationship | Address             | Phone           |
+----------------+--------------+---------------------+-----------------+
| Ada/Ed Radhames | ECON         | [address]              | [phone] |
|                |              | [place]  |                 |
|                |              |  [address]              |                 |
+----------------+--------------+---------------------+-----------------+
 
 
 
 
 Care Team Providers
 
 
+-----------------------+------+-------------+
| Care Team Member Name | Role | Phone       |
+-----------------------+------+-------------+
 PCP  | Unavailable |
+-----------------------+------+-------------+
 
 
 
 Encounter Details
 
 
+--------+-----------+----------------------+----------------------+-------------+
| Date   | Type      | Department           | Care Team            | Description |
+--------+-----------+----------------------+----------------------+-------------+
| / | [facility]  |   [facility] |   Marzena Moser  |             |
|  - | Encounter |  MED CTR GENERIC OP  | M, DO  [address]      |             |
|        |           | CONV DEPT  [address]     | [address]      |             |
| / |           | [address]  [place], | [place]      |             |
|    |           |  [place] [address]       | [id number] [phone]  |             |
|        |           | [phone]         |  [phone] (Fax)  |             |
+--------+-----------+----------------------+----------------------+-------------+
 
 
 
 Social History
 
 
+----------------+-------+-----------+--------+------+
| Tobacco Use    | Types | Packs/Day | Years  | Date |
|                |       |           | Used   |      |
+----------------+-------+-----------+--------+------+
| Never Assessed |       |           |        |      |
+----------------+-------+-----------+--------+------+
 
 
 
+------------------+---------------+
| Sex Assigned at  | Date Recorded |
| Birth            |               |
+------------------+---------------+
| Not on file      |               |
+------------------+---------------+
 
 
 
+----------------+-------------+-------------+
| Job Start Date | Occupation  | Industry    |
+----------------+-------------+-------------+
| Not on file    | Not on file | Not on file |
+----------------+-------------+-------------+
 
 
 
+----------------+--------------+------------+
| Travel History | Travel Start | Travel End |
+----------------+--------------+------------+
 
 
 
 
+-------------------------------------+
| No recent travel history available. |
+-------------------------------------+
 documented as of this encounter
 
 Plan of Treatment
 
 
+--------+-----------+------------+----------------------+-------------------+
| Date   | Type      | Specialty  | Care Team            | Description       |
+--------+-----------+------------+----------------------+-------------------+
| / | Office    | Cardiology |   Tonia Wilson,    |                   |
|    | Visit     |            | [address]   |                   |
|        |           |            | St  [place], [place]  |                   |
|        |           |            | [address]  [phone]  |                   |
|        |           |            |  [phone] (Fax)  |                   |
+--------+-----------+------------+----------------------+-------------------+
| / | Hospital  | Radiology  |   Popeye Townsend, |                   |
|    | Encounter |            |  MD  [address] |                   |
|        |           |            |  St.  [place],   |                   |
|        |           |            | [place] [address]             |                   |
|        |           |            | [phone]         |                   |
|        |           |            | [phone] (Fax)   |                   |
+--------+-----------+------------+----------------------+-------------------+
| / | Surgery   | Radiology  |   Popeye Townsend, | CV EP PPM SYSTEM  |
|    |           |            |  MD  [address] | IMPLANT           |
|        |           |            |  St.  [place],   |                   |
|        |           |            | [place] [address]             |                   |
|        |           |            | [phone]         |                   |
|        |           |            | [phone] (Fax)   |                   |
+--------+-----------+------------+----------------------+-------------------+
| 02/10/ | Clinical  | Cardiology |                      |                   |
|    | Support   |            |                      |                   |
+--------+-----------+------------+----------------------+-------------------+
| / | Office    | Cardiology |   Tonia Wilson,    |                   |
|    | Visit     |            | BELKIS  [address]   |                   |
|        |           |            | [place]  |                   |
|        |           |            | [address]  [phone]  |                   |
|        |           |            |  [phone] (Fax)  |                   |
+--------+-----------+------------+----------------------+-------------------+
| / | Off-Site  | Nephrology |   Marzena Moser  |                   |
|    | Visit     |            | DO ETIENNE  [address] [place] [address]      |                   |
|        |           |            | [address]      |                   |
|        |           |            | [place]      |                   |
|        |           |            | [address]  [phone]  |                   |
|        |           |            |  [phone] (Fax)  |                   |
+--------+-----------+------------+----------------------+-------------------+
 documented as of this encounter
 
 Visit Diagnoses
 Not on filedocumented in this encounter

## 2020-01-08 NOTE — XMS
Encounter Summary
  Created on: 2020
 
 Viviana Ricci
 External Reference #: [id number]
 : 47
 Sex: Female
 
 Demographics
 
 
+-----------------------+----------------------+
| Address               | [address]      |
|                       | [place]  [address] |
+-----------------------+----------------------+
| Home Phone            | [phone]      |
+-----------------------+----------------------+
| Preferred Language    | Unknown              |
+-----------------------+----------------------+
| Marital Status        | Single               |
+-----------------------+----------------------+
| [employer] Affiliation | [id number]                 |
+-----------------------+----------------------+
| Race                  | Unknown              |
+-----------------------+----------------------+
| Ethnic Group          | Unknown              |
+-----------------------+----------------------+
 
 
 Author
 
 
+--------------+--------------------------------------------+
| Author       | [facility]  |
|              | and [facility]                                |
+--------------+--------------------------------------------+
| Organization | [facility]  |
|              | and [facility]                                |
+--------------+--------------------------------------------+
| Address      | Unknown                                    |
+--------------+--------------------------------------------+
| Phone        | Unavailable                                |
+--------------+--------------------------------------------+
 
 
 
 Support
 
 
+----------------+--------------+---------------------+-----------------+
| Name           | Relationship | Address             | Phone           |
+----------------+--------------+---------------------+-----------------+
| Ada/Ed Radhames | ECON         | [address]              | [phone] |
|                |              | [place]  |                 |
|                |              |  [address]              |                 |
+----------------+--------------+---------------------+-----------------+
 
 
 
 
 Care Team Providers
 
 
+-----------------------+------+-------------+
| Care Team Member Name | Role | Phone       |
+-----------------------+------+-------------+
 PCP  | Unavailable |
+-----------------------+------+-------------+
 
 
 
 Reason for Visit
 
 
+-------------------+----------+
| Reason            | Comments |
+-------------------+----------+
| Medication Refill |          |
+-------------------+----------+
 
 
 
 Encounter Details
 
 
+--------+--------+---------------------+----------------------+-------------------+
| Date   | Type   | Department          | Care Team            | Description       |
+--------+--------+---------------------+----------------------+-------------------+
| / | Refill |   [facility]         |   Marzena Moser  | Medication Refill |
|    |        | NEPHROLOGY  [address]   | M, DO  [address]      |                   |
|        |        | [address]   | [address]      |                   |
|        |        | [place], [place]     | [place], [place]      |                   |
|        |        | [address]          | [address]  [phone]  |                   |
|        |        | [phone]        |  [phone] (Fax)  |                   |
+--------+--------+---------------------+----------------------+-------------------+
 
 
 
 Social History
 
 
+--------------+-------+-----------+--------+------+
| Tobacco Use  | Types | Packs/Day | Years  | Date |
|              |       |           | Used   |      |
+--------------+-------+-----------+--------+------+
| Never Smoker |       |           |        |      |
+--------------+-------+-----------+--------+------+
 
 
 
+---------------------+---+---+---+
| Smokeless Tobacco:  |   |   |   |
| Never Used          |   |   |   |
+---------------------+---+---+---+
 
 
 
+---------------------------------------------------------------+
| Comments: some second hand smoke exposure, but fairly minimal |
 
+---------------------------------------------------------------+
 
 
 
+-------------+-------------+---------+----------+
| Alcohol Use | Drinks/Week | oz/Week | Comments |
+-------------+-------------+---------+----------+
| No          |             |         |          |
+-------------+-------------+---------+----------+
 
 
 
+------------------+---------------+
| Sex Assigned at  | Date Recorded |
| Birth            |               |
+------------------+---------------+
| Not on file      |               |
+------------------+---------------+
 
 
 
+----------------+-------------+-------------+
| Job Start Date | Occupation  | Industry    |
+----------------+-------------+-------------+
| Not on file    | Not on file | Not on file |
+----------------+-------------+-------------+
 
 
 
+----------------+--------------+------------+
| Travel History | Travel Start | Travel End |
+----------------+--------------+------------+
 
 
 
+-------------------------------------+
| No recent travel history available. |
+-------------------------------------+
 documented as of this encounter
 
 Plan of Treatment
 
 
+--------+-----------+------------+----------------------+-------------------+
| Date   | Type      | Specialty  | Care Team            | Description       |
+--------+-----------+------------+----------------------+-------------------+
| / | Office    | Cardiology |   HellalfredoTonia,    |                   |
|    | Visit     |            | ARNP  [address]   |                   |
|        |           |            | St  [place], [place]  |                   |
|        |           |            | [address]  [phone]  |                   |
|        |           |            |  [phone] (Fax)  |                   |
+--------+-----------+------------+----------------------+-------------------+
| / | Hospital  | Radiology  |   Popeye Townsend, |                   |
|    | Encounter |            |  MD  [address] |                   |
|        |           |            |  St.  [place],   |                   |
|        |           |            | [place] [address]             |                   |
|        |           |            | [phone]         |                   |
|        |           |            | [phone] (Fax)   |                   |
+--------+-----------+------------+----------------------+-------------------+
| / | Surgery   | Radiology  |   Popeye Townsend, | CV EP PPM SYSTEM  |
 
|    |           |            |  MD  [address] | IMPLANT           |
|        |           |            |  St.  [place],   |                   |
|        |           |            | [place] [address]             |                   |
|        |           |            | [phone]         |                   |
|        |           |            | [phone] (Fax)   |                   |
+--------+-----------+------------+----------------------+-------------------+
| 02/10/ | Clinical  | Cardiology |                      |                   |
|    | Support   |            |                      |                   |
+--------+-----------+------------+----------------------+-------------------+
| / | Office    | Cardiology |   Tonia Wilson,    |                   |
|    | Visit     |            | ARNP  [address]   |                   |
|        |           |            | [place]  |                   |
|        |           |            | [address]  [phone]  |                   |
|        |           |            |  [phone] (Fax)  |                   |
+--------+-----------+------------+----------------------+-------------------+
| / | Off-Site  | Nephrology |   Marzena Moser  |                   |
|    | Visit     |            | DO ETIENNE  [address] [place] [address]      |                   |
|        |           |            | [address]      |                   |
|        |           |            | [place]      |                   |
|        |           |            | [address]  [phone]  |                   |
|        |           |            |  [phone] (Fax)  |                   |
+--------+-----------+------------+----------------------+-------------------+
 documented as of this encounter
 
 Visit Diagnoses
 Not on filedocumented in this encounter

## 2020-01-08 NOTE — XMS
Encounter Summary
  Created on: 2020
 
 Viviana Ricci
 External Reference #: [id number]
 : 47
 Sex: Female
 
 Demographics
 
 
+-----------------------+----------------------+
| Address               | [address]      |
|                       | [place]  [address] |
+-----------------------+----------------------+
| Home Phone            | [phone]      |
+-----------------------+----------------------+
| Preferred Language    | Unknown              |
+-----------------------+----------------------+
| Marital Status        | Single               |
+-----------------------+----------------------+
| Episcopalian Affiliation | [id number]                 |
+-----------------------+----------------------+
| Race                  | Unknown              |
+-----------------------+----------------------+
| Ethnic Group          | Unknown              |
+-----------------------+----------------------+
 
 
 Author
 
 
+--------------+--------------------------------------------+
| Author       | [facility]  |
|              | and [place]                                |
+--------------+--------------------------------------------+
| Organization | [facility] [place]  |
|              | and [place]                                |
+--------------+--------------------------------------------+
| Address      | Unknown                                    |
+--------------+--------------------------------------------+
| Phone        | Unavailable                                |
+--------------+--------------------------------------------+
 
 
 
 Support
 
 
+----------------+--------------+---------------------+-----------------+
| Name           | Relationship | Address             | Phone           |
+----------------+--------------+---------------------+-----------------+
| Ada/Ed Radhames | ECON         | [address]              | [phone] |
|                |              | [place]  |                 |
|                |              |  [address]              |                 |
+----------------+--------------+---------------------+-----------------+
 
 
 
 
 Care Team Providers
 
 
+------------------------+------+-----------------+
| Care Team Member Name  | Role | Phone           |
+------------------------+------+-----------------+
| Marzena Moser DO | PCP  | [phone] |
+------------------------+------+-----------------+
 
 
 
 Reason for Visit
 
 
+--------+------------------------------+
| Reason | Comments                     |
+--------+------------------------------+
| Other  | Pt qualified for Pulm Rehab  |
+--------+------------------------------+
 
 
 
 Encounter Details
 
 
+--------+-----------+----------------------+----------------------+----------------------+
| Date   | Type      | Department           | Care Team            | Description          |
+--------+-----------+----------------------+----------------------+----------------------+
| 10/21/ | Telephone |   [facility]          |   Tonia Wilson,    | Other (Pt qualified  |
|    |           | CARDIOLOGY  [address]    | ARNP  [address]   | for Pul Rehab )     |
|        |           | [address]  [place], | St  [place], [place]  |                      |
|        |           |  [place] [address]       | [address] [phone]  |                      |
|        |           | [phone]         |  [phone] (Fax)  |                      |
+--------+-----------+----------------------+----------------------+----------------------+
 
 
 
 Social History
 
 
+--------------+-------+-----------+--------+------+
| Tobacco Use  | Types | Packs/Day | Years  | Date |
|              |       |           | Used   |      |
+--------------+-------+-----------+--------+------+
| Never Smoker |       |           |        |      |
+--------------+-------+-----------+--------+------+
 
 
 
+---------------------+---+---+---+
| Smokeless Tobacco:  |   |   |   |
| Never Used          |   |   |   |
+---------------------+---+---+---+
 
 
 
+---------------------------------------------------------------+
| Comments: some second hand smoke exposure, but fairly minimal |
+---------------------------------------------------------------+
 
 
 
 
+-------------+-------------+---------+----------+
| Alcohol Use | Drinks/Week | oz/Week | Comments |
+-------------+-------------+---------+----------+
| No          |             |         |          |
+-------------+-------------+---------+----------+
 
 
 
+------------------+---------------+
| Sex Assigned at  | Date Recorded |
| Birth            |               |
+------------------+---------------+
| Not on file      |               |
+------------------+---------------+
 
 
 
+----------------+-------------+-------------+
| Job Start Date | Occupation  | Industry    |
+----------------+-------------+-------------+
| Not on file    | Not on file | Not on file |
+----------------+-------------+-------------+
 
 
 
+----------------+--------------+------------+
| Travel History | Travel Start | Travel End |
+----------------+--------------+------------+
 
 
 
+-------------------------------------+
| No recent travel history available. |
+-------------------------------------+
 documented as of this encounter
 
 Plan of Treatment
 
 
+--------+-----------+------------+----------------------+-------------------+
| Date   | Type      | Specialty  | Care Team            | Description       |
+--------+-----------+------------+----------------------+-------------------+
| / | Office    | Cardiology |   Tonia Wilson,    |                   |
|    | Visit     |            | ARNJESSICA  [address]   |                   |
|        |           |            | St  [place], [place]  |                   |
|        |           |            | [address]  [phone]  |                   |
|        |           |            |  [phone] (Fax)  |                   |
+--------+-----------+------------+----------------------+-------------------+
| / | Hospital  | Radiology  |   Popeye Townsend, |                   |
|    | Encounter |            |  MD  [address] |                   |
|        |           |            |  St.  [place],   |                   |
|        |           |            | [place] [address]             |                   |
|        |           |            | [phone]         |                   |
|        |           |            | [phone] (Fax)   |                   |
+--------+-----------+------------+----------------------+-------------------+
| / | Surgery   | Radiology  |   Popeye Townsend, | CV EP PPM SYSTEM  |
|    |           |            |  MD  [address] | IMPLANT           |
 
|        |           |            |  St.  [place],   |                   |
|        |           |            | [place] [address]             |                   |
|        |           |            | [phone]         |                   |
|        |           |            | [phone] (Fax)   |                   |
+--------+-----------+------------+----------------------+-------------------+
| 02/10/ | Clinical  | Cardiology |                      |                   |
|    | Support   |            |                      |                   |
+--------+-----------+------------+----------------------+-------------------+
| / | Office    | Cardiology |   Tonia Wilson,    |                   |
|    | Visit     |            | BELKIS  [address]   |                   |
|        |           |            | [place]  |                   |
|        |           |            | [address]  [phone]  |                   |
|        |           |            |  [phone] (Fax)  |                   |
+--------+-----------+------------+----------------------+-------------------+
| / | Off-Site  | Nephrology |   Marzena Moser  |                   |
|    | Visit     |            | DO ETIENNE  [address] [place] [address]      |                   |
|        |           |            | [address]      |                   |
|        |           |            | [place]      |                   |
|        |           |            | [address]  [phone]  |                   |
|        |           |            |  [phone] (Fax)  |                   |
+--------+-----------+------------+----------------------+-------------------+
 documented as of this encounter
 
 Visit Diagnoses
 Not on filedocumented in this encounter

## 2020-01-08 NOTE — XMS
Encounter Summary
  Created on: 2020
 
 Viviana Ricci
 External Reference #: [id number]
 : 47
 Sex: Female
 
 Demographics
 
 
+-----------------------+----------------------+
| Address               | [address]      |
|                       | [place]  [address] |
+-----------------------+----------------------+
| Home Phone            | [phone]      |
+-----------------------+----------------------+
| Preferred Language    | Unknown              |
+-----------------------+----------------------+
| Marital Status        | Single               |
+-----------------------+----------------------+
| Worship Affiliation | [id number]                 |
+-----------------------+----------------------+
| Race                  | Unknown              |
+-----------------------+----------------------+
| Ethnic Group          | Unknown              |
+-----------------------+----------------------+
 
 
 Author
 
 
+--------------+--------------------------------------------+
| Author       | [facility] [place]  |
|              | and [place]                                |
+--------------+--------------------------------------------+
| Organization | [facility] [place]  |
|              | and [place]                                |
+--------------+--------------------------------------------+
| Address      | Unknown                                    |
+--------------+--------------------------------------------+
| Phone        | Unavailable                                |
+--------------+--------------------------------------------+
 
 
 
 Support
 
 
+----------------+--------------+---------------------+-----------------+
| Name           | Relationship | Address             | Phone           |
+----------------+--------------+---------------------+-----------------+
| Ada/Ed Radhames | ECON         | [address]              | [phone] |
|                |              | [place]  |                 |
|                |              |  [address]              |                 |
+----------------+--------------+---------------------+-----------------+
 
 
 
 
 Care Team Providers
 
 
+------------------------+------+-----------------+
| Care Team Member Name  | Role | Phone           |
+------------------------+------+-----------------+
| Marzena Moser DO | PCP  | [phone] |
+------------------------+------+-----------------+
 
 
 
 Encounter Details
 
 
+--------+----------+---------------------+----------------------+-------------+
| Date   | Type     | Department          | Care Team            | Description |
+--------+----------+---------------------+----------------------+-------------+
| 08/15/ | Abstract |   [facility] [place]         |   Marzena Moser  |             |
| 2019   |          | NEPHROLOGY  [address]   | DO ETIENNE  [address]      |             |
|        |          | [address]   | [address]      |             |
|        |          | [place], [place]     | [place], [place]      |             |
|        |          | [address]          | [address]  [phone]  |             |
|        |          | [phone]        |  [phone] (Fax)  |             |
+--------+----------+---------------------+----------------------+-------------+
 
 
 
 Social History
 
 
+--------------+-------+-----------+--------+------+
| Tobacco Use  | Types | Packs/Day | Years  | Date |
|              |       |           | Used   |      |
+--------------+-------+-----------+--------+------+
| Never Smoker |       |           |        |      |
+--------------+-------+-----------+--------+------+
 
 
 
+---------------------+---+---+---+
| Smokeless Tobacco:  |   |   |   |
| Never Used          |   |   |   |
+---------------------+---+---+---+
 
 
 
+---------------------------------------------------------------+
| Comments: some second hand smoke exposure, but fairly minimal |
+---------------------------------------------------------------+
 
 
 
+-------------+-------------+---------+----------+
| Alcohol Use | Drinks/Week | oz/Week | Comments |
+-------------+-------------+---------+----------+
| No          |             |         |          |
+-------------+-------------+---------+----------+
 
 
 
 
+------------------+---------------+
| Sex Assigned at  | Date Recorded |
| Birth            |               |
+------------------+---------------+
| Not on file      |               |
+------------------+---------------+
 
 
 
+----------------+-------------+-------------+
| Job Start Date | Occupation  | Industry    |
+----------------+-------------+-------------+
| Not on file    | Not on file | Not on file |
+----------------+-------------+-------------+
 
 
 
+----------------+--------------+------------+
| Travel History | Travel Start | Travel End |
+----------------+--------------+------------+
 
 
 
+-------------------------------------+
| No recent travel history available. |
+-------------------------------------+
 documented as of this encounter
 
 Plan of Treatment
 
 
+--------+-----------+------------+----------------------+-------------------+
| Date   | Type      | Specialty  | Care Team            | Description       |
+--------+-----------+------------+----------------------+-------------------+
| / | Office    | Cardiology |   Tonia Wilson,    |                   |
|    | Visit     |            | ARNP  [address]   |                   |
|        |           |            | St  [place] [facility] [place]  |                   |
|        |           |            | [address]  [phone]  |                   |
|        |           |            |  [phone] (Fax)  |                   |
+--------+-----------+------------+----------------------+-------------------+
| / | Hospital  | Radiology  |   Popeye Townsend, |                   |
|    | Encounter |            |  MD  [address] |                   |
|        |           |            |  St.  [place],   |                   |
|        |           |            | [place] [address]             |                   |
|        |           |            | [phone]         |                   |
|        |           |            | [phone] (Fax)   |                   |
+--------+-----------+------------+----------------------+-------------------+
| / | Surgery   | Radiology  |   Popeye Townsend, | CV EP PPM SYSTEM  |
|    |           |            |  MD  [address] | IMPLANT           |
|        |           |            |  St.  [place],   |                   |
|        |           |            | [place] [address]             |                   |
|        |           |            | [phone]         |                   |
|        |           |            | [phone] (Fax)   |                   |
+--------+-----------+------------+----------------------+-------------------+
| 02/10/ | Clinical  | Cardiology |                      |                   |
2020   | Support   |            |                      |                   |
+--------+-----------+------------+----------------------+-------------------+
| / | Office    | Cardiology |   Tonia Wilson,    |                   |
|    | Visit     |            | [facility]  [address]   |                   |
 
|        |           |            |   [place]  |                   |
|        |           |            | [address]  [phone]  |                   |
|        |           |            |  [phone] (Fax)  |                   |
+--------+-----------+------------+----------------------+-------------------+
| / | Off-Site  | Nephrology |   Marzena Moser  |                   |
2020   | Visit     |            | DO ETIENNE  [address]      |                   |
|        |           |            | [address]      |                   |
|        |           |            | [place]      |                   |
|        |           |            | [address]  [phone]  |                   |
|        |           |            |  [phone] (Fax)  |                   |
+--------+-----------+------------+----------------------+-------------------+
 documented as of this encounter
 
 Procedures
 
 
+--------------------+--------+------------+----------------------+----------------------+
| Procedure Name     | Priori | Date/Time  | Associated Diagnosis | Comments             |
|                    | ty     |            |                      |                      |
+--------------------+--------+------------+----------------------+----------------------+
| EXTERNAL LAB:      | Routin | 2019 |                      |   Results for this   |
| TACROLIMUS LEVEL,  | e      |            |                      | procedure are in the |
| LC-MS/MS           |        |            |                      |  results section.    |
+--------------------+--------+------------+----------------------+----------------------+
 documented in this encounter
 
 Results
 External Lab: Tacrolimus Level, LC-MS/MS (2019)
 
+-------------+-------+-----------+------------+--------------+
| Component   | Value | Ref Range | Performed  | Pathologist  |
|             |       |           | At         | Signature    |
+-------------+-------+-----------+------------+--------------+
| Tacrolimus, | 7.8   |           |            |              |
|  LC-MS/MS,  |       |           |            |              |
| External    |       |           |            |              |
+-------------+-------+-----------+------------+--------------+
 
 
 
+----------+
| Specimen |
+----------+
|          |
+----------+
 documented in this encounter
 
 Visit Diagnoses
 Not on filedocumented in this encounter

## 2020-01-08 NOTE — XMS
Encounter Summary
  Created on: 2020
 
 Viviana Ricci
 External Reference #: [id number]
 : 47
 Sex: Female
 
 Demographics
 
 
+-----------------------+----------------------+
| Address               | [address]      |
|                       | [place]  [address] |
+-----------------------+----------------------+
| Home Phone            | [phone]      |
+-----------------------+----------------------+
| Preferred Language    | Unknown              |
+-----------------------+----------------------+
| Marital Status        | Single               |
+-----------------------+----------------------+
| Scientology Affiliation | [id number]                 |
+-----------------------+----------------------+
| Race                  | Unknown              |
+-----------------------+----------------------+
| Ethnic Group          | Unknown              |
+-----------------------+----------------------+
 
 
 Author
 
 
+--------------+--------------------------------------------+
| Author       | [facility]  |
|              | and [facility]                                |
+--------------+--------------------------------------------+
| Organization | [facility]  |
|              | and [facility]                                |
+--------------+--------------------------------------------+
| Address      | Unknown                                    |
+--------------+--------------------------------------------+
| Phone        | Unavailable                                |
+--------------+--------------------------------------------+
 
 
 
 Support
 
 
+----------------+--------------+---------------------+-----------------+
| Name           | Relationship | Address             | Phone           |
+----------------+--------------+---------------------+-----------------+
| Ada/Ed Radhames | ECON         | [address]              | [phone] |
|                |              | [place]  |                 |
|                |              |  [address]              |                 |
+----------------+--------------+---------------------+-----------------+
 
 
 
 
 Care Team Providers
 
 
+-----------------------+------+-------------+
| Care Team Member Name | Role | Phone       |
+-----------------------+------+-------------+
 PCP  | Unavailable |
+-----------------------+------+-------------+
 
 
 
 Encounter Details
 
 
+--------+-----------+----------------------+----------------------+-------------+
| Date   | Type      | Department           | Care Team            | Description |
+--------+-----------+----------------------+----------------------+-------------+
| / | [facility]  |   [facility] |   Marzena Moser  |             |
|    | Encounter |  MED CTR XRAY  [address] | M, DO  [address]      |             |
|        |           |  [place]       | [address]      |             |
|        |           | [place] [id number] | [place]      |             |
|        |           |   [phone]       | [address] [phone]  |             |
|        |           |                      |  [phone] (Fax)  |             |
+--------+-----------+----------------------+----------------------+-------------+
 
 
 
 Social History
 
 
+----------------+-------+-----------+--------+------+
| Tobacco Use    | Types | Packs/Day | Years  | Date |
|                |       |           | Used   |      |
+----------------+-------+-----------+--------+------+
| Never Assessed |       |           |        |      |
+----------------+-------+-----------+--------+------+
 
 
 
+------------------+---------------+
| Sex Assigned at  | Date Recorded |
| Birth            |               |
+------------------+---------------+
| Not on file      |               |
+------------------+---------------+
 
 
 
+----------------+-------------+-------------+
| Job Start Date | Occupation  | Industry    |
+----------------+-------------+-------------+
| Not on file    | Not on file | Not on file |
+----------------+-------------+-------------+
 
 
 
+----------------+--------------+------------+
| Travel History | Travel Start | Travel End |
+----------------+--------------+------------+
 
 
 
 
+-------------------------------------+
| No recent travel history available. |
+-------------------------------------+
 documented as of this encounter
 
 Plan of Treatment
 
 
+--------+-----------+------------+----------------------+-------------------+
| Date   | Type      | Specialty  | Care Team            | Description       |
+--------+-----------+------------+----------------------+-------------------+
| / | Office    | Cardiology |   Tonia Wilson,    |                   |
|    | Visit     |            | ARN[place]ESSICA  [address]   |                   |
|        |           |            | St  [place], [place]  |                   |
|        |           |            | [address]  [phone]  |                   |
|        |           |            |  [phone] (Fax)  |                   |
+--------+-----------+------------+----------------------+-------------------+
| / | Hospital  | Radiology  |   Popeye Townsend, |                   |
|    | Encounter |            |  MD  [address] |                   |
|        |           |            |  [place],   |                   |
|        |           |            | [place] [address]             |                   |
|        |           |            | [phone]         |                   |
|        |           |            | [phone] (Fax)   |                   |
+--------+-----------+------------+----------------------+-------------------+
| / | Surgery   | Radiology  |   Popeye Townsend, | CV EP PPM SYSTEM  |
|    |           |            |  MD  [address] | IMPLANT           |
|        |           |            |  [place],   |                   |
|        |           |            | [place] [address]             |                   |
|        |           |            | [phone]         |                   |
|        |           |            | [phone] (Fax)   |                   |
+--------+-----------+------------+----------------------+-------------------+
| 02/10/ | Clinical  | Cardiology |                      |                   |
|    | Support   |            |                      |                   |
+--------+-----------+------------+----------------------+-------------------+
| / | Office    | Cardiology |   Tonia Wilson,    |                   |
|    | Visit     |            | [place]  [address]   |                   |
|        |           |            | [place]  |                   |
|        |           |            | [address]  [phone]  |                   |
|        |           |            |  [phone] (Fax)  |                   |
+--------+-----------+------------+----------------------+-------------------+
| / | Off-Site  | Nephrology |   Marzena Moser  |                   |
|    | Visit     |            | DO ETIENNE  [address] [place] [address]      |                   |
|        |           |            | [address]      |                   |
|        |           |            | [place]      |                   |
|        |           |            | [address] [phone]  |                   |
|        |           |            |  [phone] (Fax)  |                   |
+--------+-----------+------------+----------------------+-------------------+
 documented as of this encounter
 
 Visit Diagnoses
 Not on filedocumented in this encounter

## 2020-01-08 NOTE — XMS
Encounter Summary
  Created on: 2020
 
 Viviana Ricci
 External Reference #: [id number]
 : 47
 Sex: Female
 
 Demographics
 
 
+-----------------------+----------------------+
| Address               | [address]      |
|                       | [place]  [address] |
+-----------------------+----------------------+
| Home Phone            | [phone]      |
+-----------------------+----------------------+
| Preferred Language    | Unknown              |
+-----------------------+----------------------+
| Marital Status        | Single               |
+-----------------------+----------------------+
| [employer] Affiliation | [id number]                 |
+-----------------------+----------------------+
| Race                  | Unknown              |
+-----------------------+----------------------+
| Ethnic Group          | Unknown              |
+-----------------------+----------------------+
 
 
 Author
 
 
+--------------+--------------------------------------------+
| Author       | [facility]  |
|              | and [facility]                                |
+--------------+--------------------------------------------+
| Organization | [facility]  |
|              | and [facility]                                |
+--------------+--------------------------------------------+
| Address      | Unknown                                    |
+--------------+--------------------------------------------+
| Phone        | Unavailable                                |
+--------------+--------------------------------------------+
 
 
 
 Support
 
 
+----------------+--------------+---------------------+-----------------+
| Name           | Relationship | Address             | Phone           |
+----------------+--------------+---------------------+-----------------+
| Ada/Ed Radhames | ECON         | [address]              | [phone] |
|                |              | [place]  |                 |
|                |              |  [id number]              |                 |
+----------------+--------------+---------------------+-----------------+
 
 
 
 
 Care Team Providers
 
 
+-----------------------+------+-------------+
| Care Team Member Name | Role | Phone       |
+-----------------------+------+-------------+
 PCP  | Unavailable |
+-----------------------+------+-------------+
 
 
 
 Reason for Visit
 
 
+--------------------+--------------+
| Reason             | Comments     |
+--------------------+--------------+
| Pulmonary Disease  | Consult/COPD |
| Appointment        |              |
+--------------------+--------------+
 Evaluate & Treat (Routine)
 
+--------+--------+-------------+--------------+--------------+---------------+
| Status | Reason | Specialty   | Diagnoses /  | Referred By  | Referred To   |
|        |        |             | Procedures   | Contact      | Contact       |
+--------+--------+-------------+--------------+--------------+---------------+
| Closed |        | Pulmonology |   Diagnoses  |   Provider,  |   Fabrice   |
|        |        |             |  CONS/COPD & | Authorizing  | Andres         |
|        |        |             |  JACK/SELF    |              | MD [place]  |
|        |        |             | REF/LAST     |              |  [address]  |
|        |        |             | SEEN BY    |              | S  [place],   |
|        |        |             | [place]   |              | [place] [address]      |
|        |        |             | 2014/POSS |              | Phone:        |
|        |        |             | [employer] [facility]   |              | [phone]  |
|        |        |             | [facility] |              |  Fax:         |
|        |        |             |              |              | [phone]  |
|        |        |             | Procedures   |              |               |
|        |        |             | NEW PATIENT  |              |               |
+--------+--------+-------------+--------------+--------------+---------------+
 
 
 
 
 Encounter Details
 
 
+--------+---------+----------------------+----------------------+----------------------+
| Date   | Type    | Department           | Care Team            | Description          |
+--------+---------+----------------------+----------------------+----------------------+
| 08/15/ | Office  |   [facility] [place]          |   Andres Avina    | Chronic diastolic    |
| 2018   | Visit   | PULMONARY  [address]     | [address]    | CHF (congestive      |
|        |         | [place]  [place], | [address]  [place],  | heart failure), NYHA |
|        |         |  [place] [address]       | [place] [address]             |  class 3 (HCC)       |
|        |         | [phone]         | [phone]         | (Primary Dx);        |
|        |         |                      | [phone] (Fax)   | Obstructive sleep    |
|        |         |                      |                      | apnea syndrome;      |
|        |         |                      |                      | Chronic bronchitis,  |
|        |         |                      |                      | mucopurulent (HCC)   |
+--------+---------+----------------------+----------------------+----------------------+
 
 
 
 
 Social History
 
 
+--------------+-------+-----------+--------+------+
| Tobacco Use  | Types | Packs/Day | Years  | Date |
|              |       |           | Used   |      |
+--------------+-------+-----------+--------+------+
| Never Smoker |       |           |        |      |
+--------------+-------+-----------+--------+------+
 
 
 
+---------------------+---+---+---+
| Smokeless Tobacco:  |   |   |   |
| Never Used          |   |   |   |
+---------------------+---+---+---+
 
 
 
+---------------------------------------------------------------+
| Comments: some second hand smoke exposure, but fairly minimal |
+---------------------------------------------------------------+
 
 
 
+-------------+-------------+---------+----------+
| Alcohol Use | Drinks/Week | oz/Week | Comments |
+-------------+-------------+---------+----------+
| No          |             |         |          |
+-------------+-------------+---------+----------+
 
 
 
+------------------+---------------+
| Sex Assigned at  | Date Recorded |
| Birth            |               |
+------------------+---------------+
| Not on file      |               |
+------------------+---------------+
 
 
 
+----------------+-------------+-------------+
| Job Start Date | Occupation  | Industry    |
+----------------+-------------+-------------+
| Not on file    | Not on file | Not on file |
+----------------+-------------+-------------+
 
 
 
+----------------+--------------+------------+
| Travel History | Travel Start | Travel End |
+----------------+--------------+------------+
 
 
 
+-------------------------------------+
 
| No recent travel history available. |
+-------------------------------------+
 documented as of this encounter
 
 Last Filed Vital Signs
 
 
+-------------------+-------------------+----------------------+----------+
| Vital Sign        | Reading           | Time Taken           | Comments |
+-------------------+-------------------+----------------------+----------+
| Blood Pressure    | 110/60            | 08/15/2018 11:01 AM  |          |
|                   |                   | PDT                  |          |
+-------------------+-------------------+----------------------+----------+
| Pulse             | 66                | 08/15/2018 11:01 AM  |          |
|                   |                   | PDT                  |          |
+-------------------+-------------------+----------------------+----------+
| Temperature       | 36.7   C (98   F) | 08/15/2018 11:01 AM  |          |
|                   |                   | PDT                  |          |
+-------------------+-------------------+----------------------+----------+
| Respiratory Rate  | -                 | -                    |          |
+-------------------+-------------------+----------------------+----------+
| Oxygen Saturation | 96%               | 08/15/2018 11:01 AM  | Room Air |
|                   |                   | PDT                  |          |
+-------------------+-------------------+----------------------+----------+
| Inhaled Oxygen    | -                 | -                    |          |
| Concentration     |                   |                      |          |
+-------------------+-------------------+----------------------+----------+
| Weight            | 122.8 kg (270 lb  | 08/15/2018 11:01 AM  |          |
|                   | 11.6 oz)          | PDT                  |          |
+-------------------+-------------------+----------------------+----------+
| Height            | 167.6 cm (5' 6")  | 08/15/2018 11:01 AM  |          |
|                   |                   | PDT                  |          |
+-------------------+-------------------+----------------------+----------+
| Body Mass Index   | 43.7              | 08/15/2018 11:01 AM  |          |
|                   |                   | PDT                  |          |
+-------------------+-------------------+----------------------+----------+
 documented in this encounter
 
 Progress Notes
 Andres Avina MD - 08/15/2018 11:00 AM PDT72-year-old never smoker with annoyin
g
 Chronic sputum production, obstructive sleep apnea on CPAP with oxygen, and chronic leg rc
ma and cardiomegaly
 
 She is stable from the point of view of major illness.  She has a kidney transplant and use
s mycophenolate and low-dose prednisone.
 
 From the pulmonary point of view, she uses CPAP full face mask with oxygen bled in, and was
 told it needs to be renewed by a physician.  She finds it ies out her mouth, and I recomm
ended she try nasal pillows CPAP, with oxygen, which she said she has not tried.
 
 She is never smoked, never wheezed, never had hayfever.  But she tells me she remembers fro
m , through the present time, but she has sputum production from her throat.  It is not 
dripping down from her nose and not coming up from her lung.  She has a lot of it and coughs
 all the time.  She has a dog at home but being around the dog does not make it worse; she's
 had the dog for years.  It sounds like inflammatory sputum.  She says occasionally it is gr
een but usually it is clear.  She does nothing for it but would like to try something to rel
ieve it.
 
 She keeps chronic leg swelling, used to take Lasix 40 mg for 4, but that was when her hands
 
 were swollen also.  She very rarely takes the Lasix.  She says her legs swell when she eats
 more salt.  She seems to view this as well as additional, and optional, rather than importa
nt health issue.  Yet her chest x-ray of 2011 showed massive dangerous cardiomegaly, and
 I emphasized to her that she needs to try to keep a small heart and avoiding salt so the La
six will work, taking the Lasix, is important for her health.
 
 She has no drug allergies.  She worked as a hairdresser.  She lives with her dog for 8 year
s and no one else.
 
 Physical exam: Large and overweight woman with a walker with difficulty getting up, cheery 
in no distress.  Weight 222.8 kg, blood pressure 110/60, pulse 66 regular, temperature 98.0 
oxygen saturation 96% on room air.  HEENT shows reactive pupils, pink conjunctiva, normal or
opharynx.  The neck is normal.  There is no JVD evident.  She is a very large circumference 
neck.  There is no adenopathy.  The lung fields appear clear with no rales although breath s
ounds are hard to hear through the chest wall.  Cardiac rhythm is regular with no audible mu
rmur, but those heart sounds are difficult to hear also.  The abdomen is benign but obese.  
Her right ankle is has 2+ edema, the left has 3+. 
 
 Laboratory evaluation: Today's PA and lateral chest x-ray is similar to 2016, exce
pt today's x-ray shows more pulmonary vascular congestion and cephalization.  The most recen
t preivous chest x-rays 2016 which shows sternal wires and cardiomegaly.  Echocardi
ogram of April 15, 2014 shows an enlarged left atrium normal left ventricle normal or a and 
RV.  The family 2011 chest x-ray shows massive cardiomegaly.
 
 Impression and plan:
 
 1.  Chronic bronchitis and mucus production: I gave her prescription for Qvar 80 respi clic
k, 2 separate puffs twice daily.  I told her to try that for 3 weeks and see if it improved 
her mucus production, if it does, I told her how to very gradually, only a week at a time, t
aper to perhaps as low as 2 puffs per day, either one twice daily or 2 in the morning.
 
 2.  Renewal of need for CPAP with oxygen bled in: I confirm this.  She uses it and that wor
ks for her.  However, I would like to see if nasal CPAP mask with oxygen bled in will be mor
e comfortable.
 
 3.  Undertreated partially compensated CHF: I stress for her the importance of avoiding sal
t and taking the Lasix regularly.  The chest x-ray today is a new baseline.
 
 We spent 35 minutes face-to-face, at least half in counseling.  Word processing was used an
d I apologize for mistakes.
 
 Andres Avina M.D.
 Pulmonary critical careElectronically signed by Andres Avina MD at 08/15/2018  
1:04 PM PDTdocumented in this encounter
 
 Plan of Treatment
 
 
+--------+-----------+------------+----------------------+-------------------+
| Date   | Type      | Specialty  | Care Team            | Description       |
+--------+-----------+------------+----------------------+-------------------+
| / | Office    | Cardiology |   Arlen Wilsona,    |                   |
|    | Visit     |            | [address]   |                   |
|        |           |            | St  [place], [place]  |                   |
|        |           |            | [address]  [phone]  |                   |
|        |           |            |  [phone] (Fax)  |                   |
+--------+-----------+------------+----------------------+-------------------+
| / | Hospital  | Radiology  |   Popeye Townsend, |                   |
|    | Encounter |            |  MD  [address] [place] |                   |
|        |           |            |  [place],   |                   |
 
|        |           |            | [place] [address]             |                   |
|        |           |            | [phone]         |                   |
|        |           |            | [phone] (Fax)   |                   |
+--------+-----------+------------+----------------------+-------------------+
| / | Surgery   | Radiology  |   Popeye Townsend, | CV EP PPM SYSTEM  |
|    |           |            |  MD  [address] | IMPLANT           |
|        |           |            |  St.  [place],   |                   |
|        |           |            | [place] [address]             |                   |
|        |           |            | [phone]         |                   |
|        |           |            | [phone] (Fax)   |                   |
+--------+-----------+------------+----------------------+-------------------+
| 02/10/ | Clinical  | Cardiology |                      |                   |
|    | Support   |            |                      |                   |
+--------+-----------+------------+----------------------+-------------------+
| / | Office    | Cardiology |   KatieTonia,    |                   |
|    | Visit     |            | [facility]  [address]   |                   |
|        |           |            | St  [place]  |                   |
|        |           |            | [address]  [phone]  |                   |
|        |           |            |  [phone] (Fax)  |                   |
+--------+-----------+------------+----------------------+-------------------+
| / | Off-Site  | Nephrology |   Marzena Moser  |                   |
2020   | Visit     |            | DO ETIENNE  [address] [place] [address]      |                   |
|        |           |            | [address]      |                   |
|        |           |            | [place]      |                   |
|        |           |            | [address]  [phone]  |                   |
|        |           |            |  [phone] (Fax)  |                   |
+--------+-----------+------------+----------------------+-------------------+
 documented as of this encounter
 
 Results
 XR Chest PA and Lateral (08/15/2018 12:28 PM PDT)
 
+----------+
| Specimen |
+----------+
|          |
+----------+
 
 
 
+------------------------------------------------------------------------+---------------+
| Narrative                                                              | Performed At  |
+------------------------------------------------------------------------+---------------+
|   CLINICAL INFORMATION: under-treated partially compensated CHF.       |   [facility] |
| COMPARISON: 3 2016.     FINDINGS:   Frontal and lateral views of the   |               |
| chest.      Median sternotomy changes.     Lungs: No focal airspace    |               |
| disease, pleural effusion, or pneumothorax.   Minimal  bilateral       |               |
| basilar dependent atelectasis versus scarring.     Heart/mediastinum:  |               |
| Borderline cardiomegaly. No mediastinal widening.     Bones: No acute  |               |
| osseous abnormality appreciated.     IMPRESSION - Borderline           |               |
| cardiomegaly.   No acute pulmonary disease.     Dictated and Signed    |               |
| by: Jorge Thomas MD    Electronically signed: 8/15/2018 2:16 PM    |               |
+------------------------------------------------------------------------+---------------+
 
 
 
+------------------------------------------------------------------------------------------+
| Procedure Note                                                                           |
+------------------------------------------------------------------------------------------+
|   Ralf, Rad Results In - 08/15/2018  2:19 PM PDT  CLINICAL INFORMATION: under-treated     |
 
| partially compensated CHF.COMPARISON: 3 2016.FINDINGS: Frontal and lateral views of the  |
| chest. Median sternotomy changes.Lungs: No focal airspace disease, pleural effusion, or  |
| pneumothorax.  Minimalbilateral basilar dependent atelectasis versus                     |
| scarring.Heart/mediastinum: Borderline cardiomegaly. No mediastinal widening.Bones: No   |
| acute osseous abnormality appreciated.IMPRESSION - Borderline cardiomegaly.  No acute    |
| pulmonary disease.Dictated and Signed by: Jorge Thomas MD  Electronically signed:     |
| 8/15/2018 2:16 PM                                                                        |
|                                                                                          |
|Lungs: No focal airspace disease, pleural effusion, or pneumothorax.  Minimal             |
|bilateral basilar dependent atelectasis versus scarring.                                  |
|                                                                                          |
|Heart/mediastinum: Borderline cardiomegaly. No mediastinal widening.                      |
|                                                                                          |
|Bones: No acute osseous abnormality appreciated.                                          |
|                                                                                          |
|IMPRESSION - Borderline cardiomegaly.  No acute pulmonary disease.                        |
|                                                                                          |
|Dictated and Signed by: Jorge Thomas MD                                                |
| Electronically signed: 8/15/2018 2:16 PM                                                 |
+------------------------------------------------------------------------------------------+
 
 
 
+---------------+---------+--------------------+--------------+
| Performing    | Address | City/State/Zipcode | Phone Number |
| Organization  |         |                    |              |
+---------------+---------+--------------------+--------------+
|   [facility] |         |                    |              |
+---------------+---------+--------------------+--------------+
 documented in this encounter
 
 Visit Diagnoses
 
 
+----------------------------------------------------------------------------------+
| Diagnosis                                                                        |
+----------------------------------------------------------------------------------+
|   Chronic diastolic CHF (congestive heart failure), NYHA class 3 (HCC) - Primary |
+----------------------------------------------------------------------------------+
|   Obstructive sleep apnea syndrome  Obstructive sleep apnea (adult) (pediatric)  |
+----------------------------------------------------------------------------------+
|   Chronic bronchitis, mucopurulent (HCC)  Mucopurulent chronic bronchitis        |
+----------------------------------------------------------------------------------+
 documented in this encounter done

## 2020-01-08 NOTE — XMS
Encounter Summary
  Created on: 2020
 
 Viviana Ricci
 External Reference #: [id number]
 : 47
 Sex: Female
 
 Demographics
 
 
+-----------------------+----------------------+
| Address               | [address]      |
|                       | [place]  [address] |
+-----------------------+----------------------+
| Home Phone            | [phone]      |
+-----------------------+----------------------+
| Preferred Language    | Unknown              |
+-----------------------+----------------------+
| Marital Status        | Single               |
+-----------------------+----------------------+
| Church Affiliation | [id number]                 |
+-----------------------+----------------------+
| Race                  | Unknown              |
+-----------------------+----------------------+
| Ethnic Group          | Unknown              |
+-----------------------+----------------------+
 
 
 Author
 
 
+--------------+--------------------------------------------+
| Author       | [facility]  |
|              | and [place]                                |
+--------------+--------------------------------------------+
| Organization | [facility]  |
|              | and [place]                                |
+--------------+--------------------------------------------+
| Address      | Unknown                                    |
+--------------+--------------------------------------------+
| Phone        | Unavailable                                |
+--------------+--------------------------------------------+
 
 
 
 Support
 
 
+----------------+--------------+---------------------+-----------------+
| Name           | Relationship | Address             | Phone           |
+----------------+--------------+---------------------+-----------------+
| Ada/Ed Radhames | ECON         | [address]              | [phone] |
|                |              | [place]  |                 |
|                |              |  [address]              |                 |
+----------------+--------------+---------------------+-----------------+
 
 
 
 
 Care Team Providers
 
 
+-----------------------+------+-------------+
| Care Team Member Name | Role | Phone       |
+-----------------------+------+-------------+
 PCP  | Unavailable |
+-----------------------+------+-------------+
 
 
 
 Encounter Details
 
 
+--------+-----------+----------------------+----------------------+-------------+
| Date   | Type      | Department           | Care Team            | Description |
+--------+-----------+----------------------+----------------------+-------------+
| / | [facility]  |   [facility] |   Abby,       |             |
| 2006   | Encounter |  [facility] EM[facility]Y   | Jono LEMA MD  [address]  |             |
|        |           | [address]  [address] | [address] [place]     |             |
|        |           |   [place]    | [place] [address] |             |
|        |           | [address]           |   [phone]       |             |
|        |           | [phone]         | [phone] (Fax)   |             |
+--------+-----------+----------------------+----------------------+-------------+
 
 
 
 Social History
 
 
+----------------+-------+-----------+--------+------+
| Tobacco Use    | Types | Packs/Day | Years  | Date |
|                |       |           | Used   |      |
+----------------+-------+-----------+--------+------+
| Never Assessed |       |           |        |      |
+----------------+-------+-----------+--------+------+
 
 
 
+------------------+---------------+
| Sex Assigned at  | Date Recorded |
| Birth            |               |
+------------------+---------------+
| Not on file      |               |
+------------------+---------------+
 
 
 
+----------------+-------------+-------------+
| Job Start Date | Occupation  | Industry    |
+----------------+-------------+-------------+
| Not on file    | Not on file | Not on file |
+----------------+-------------+-------------+
 
 
 
+----------------+--------------+------------+
| Travel History | Travel Start | Travel End |
+----------------+--------------+------------+
 
 
 
 
+-------------------------------------+
| No recent travel history available. |
+-------------------------------------+
 documented as of this encounter
 
 Plan of Treatment
 
 
+--------+-----------+------------+----------------------+-------------------+
| Date   | Type      | Specialty  | Care Team            | Description       |
+--------+-----------+------------+----------------------+-------------------+
| / | Office    | Cardiology |   Tonia Wilson,    |                   |
| 2020   | Visit     |            | ARNJESSICA  [address]   |                   |
|        |           |            | St [place], [place]  |                   |
|        |           |            | [address]  [phone]  |                   |
|        |           |            |  [phone] (Fax)  |                   |
+--------+-----------+------------+----------------------+-------------------+
| / | Hospital  | Radiology  |   Popeye Townsend, |                   |
|    | Encounter |            |  MD [address] |                   |
|        |           |            |  [place],   |                   |
|        |           |            | [place] [address]             |                   |
|        |           |            | [phone]         |                   |
|        |           |            | [phone] (Fax)   |                   |
+--------+-----------+------------+----------------------+-------------------+
| / | Surgery   | Radiology  |   Popeye Townsend, | CV EP PPM SYSTEM  |
|    |           |            |  MD  [address] | IMPLANT           |
|        |           |            |  [place]. [place],   |                   |
|        |           |            | [place] [address]             |                   |
|        |           |            | [phone]         |                   |
|        |           |            | [phone] (Fax)   |                   |
+--------+-----------+------------+----------------------+-------------------+
| 02/10/ | Clinical  | Cardiology |                      |                   |
|    | Support   |            |                      |                   |
+--------+-----------+------------+----------------------+-------------------+
| / | Office    | Cardiology |   Tonia Wilson,    |                   |
|    | Visit     |            | BELKIS  [address]   |                   |
|        |           |            | [place]  |                   |
|        |           |            | [address]  [phone]  |                   |
|        |           |            |  [phone] (Fax)  |                   |
+--------+-----------+------------+----------------------+-------------------+
| / | Off-Site  | Nephrology |   Marzena Moser  |                   |
|    | Visit     |            | M, DO  [address]      |                   |
|        |           |            | [address]      |                   |
|        |           |            | [place]      |                   |
|        |           |            | [address] [phone]  |                   |
|        |           |            |  [phone] (Fax)  |                   |
+--------+-----------+------------+----------------------+-------------------+
 documented as of this encounter
 
 Visit Diagnoses
 Not on filedocumented in this encounter

## 2020-01-08 NOTE — XMS
Encounter Summary
  Created on: 2020
 
 Viviana Ricci
 External Reference #: [id number]
 : 47
 Sex: Female
 
 Demographics
 
 
+-----------------------+----------------------+
| Address               | [address]      |
|                       | [place]  [address] |
+-----------------------+----------------------+
| Home Phone            | [phone]      |
+-----------------------+----------------------+
| Preferred Language    | Unknown              |
+-----------------------+----------------------+
| Marital Status        | Single               |
+-----------------------+----------------------+
| Yazidism Affiliation | [id number]                 |
+-----------------------+----------------------+
| Race                  | Unknown              |
+-----------------------+----------------------+
| Ethnic Group          | Unknown              |
+-----------------------+----------------------+
 
 
 Author
 
 
+--------------+--------------------------------------------+
| Author       | [facility]  |
|              | and [place]                                |
+--------------+--------------------------------------------+
| Organization | [facility] [place]  |
|              | and [place]                                |
+--------------+--------------------------------------------+
| Address      | Unknown                                    |
+--------------+--------------------------------------------+
| Phone        | Unavailable                                |
+--------------+--------------------------------------------+
 
 
 
 Support
 
 
+----------------+--------------+---------------------+-----------------+
| Name           | Relationship | Address             | Phone           |
+----------------+--------------+---------------------+-----------------+
| Ada/Ed Radhames | ECON         | [address]              | [phone] |
|                |              | [place]  |                 |
|                |              |  [address]              |                 |
+----------------+--------------+---------------------+-----------------+
 
 
 
 
 Care Team Providers
 
 
+-----------------------+------+-------------+
| Care Team Member Name | Role | Phone       |
+-----------------------+------+-------------+
 PCP  | Unavailable |
+-----------------------+------+-------------+
 
 
 
 Encounter Details
 
 
+--------+----------+---------------------+----------------------+-------------+
| Date   | Type     | Department          | Care Team            | Description |
+--------+----------+---------------------+----------------------+-------------+
| 06/15/ | Abstract |   [facility] [place]         |   Marzena Moser  |             |
| 2017   |          | NEPHROLOGY  [address]   | M, DO  [address]      |             |
|        |          | [address]   | [address]      |             |
|        |          | [place], [place]     | [place]      |             |
|        |          | [address]          | [address]  [phone]  |             |
|        |          | [phone]        |  [phone] (Fax)  |             |
+--------+----------+---------------------+----------------------+-------------+
 
 
 
 Social History
 
 
+--------------+-------+-----------+--------+------+
| Tobacco Use  | Types | Packs/Day | Years  | Date |
|              |       |           | Used   |      |
+--------------+-------+-----------+--------+------+
| Never Smoker |       |           |        |      |
+--------------+-------+-----------+--------+------+
 
 
 
+---------------------+---+---+---+
| Smokeless Tobacco:  |   |   |   |
| Never Used          |   |   |   |
+---------------------+---+---+---+
 
 
 
+---------------------------------------------------------------+
| Comments: some second hand smoke exposure, but fairly minimal |
+---------------------------------------------------------------+
 
 
 
+-------------+-------------+---------+----------+
| Alcohol Use | Drinks/Week | oz/Week | Comments |
+-------------+-------------+---------+----------+
| No          |             |         |          |
+-------------+-------------+---------+----------+
 
 
 
 
+------------------+---------------+
| Sex Assigned at  | Date Recorded |
| Birth            |               |
+------------------+---------------+
| Not on file      |               |
+------------------+---------------+
 
 
 
+----------------+-------------+-------------+
| Job Start Date | Occupation  | Industry    |
+----------------+-------------+-------------+
| Not on file    | Not on file | Not on file |
+----------------+-------------+-------------+
 
 
 
+----------------+--------------+------------+
| Travel History | Travel Start | Travel End |
+----------------+--------------+------------+
 
 
 
+-------------------------------------+
| No recent travel history available. |
+-------------------------------------+
 documented as of this encounter
 
 Progress Maite Perez - 06/15/2017  1:25 PM PDTOutside record:  Standing lab orders from [facility], [place].  Dos:  17.  Sent to scan.Electronically signed by Maite Raygoza at
 06/15/2017  1:26 PM PDTdocumented in this encounter
 
 Plan of Treatment
 
 
+--------+-----------+------------+----------------------+-------------------+
| Date   | Type      | Specialty  | Care Team            | Description       |
+--------+-----------+------------+----------------------+-------------------+
| / | Office    | Cardiology |   Tonia Wilson,    |                   |
|    | Visit     |            | ARN  [address]   |                   |
|        |           |            | St  [place]  |                   |
|        |           |            | [address]  [phone]  |                   |
|        |           |            |  [phone] (Fax)  |                   |
+--------+-----------+------------+----------------------+-------------------+
| / | Hospital  | Radiology  |   Popeye Townsend, |                   |
|    | Encounter |            |  MD  [address] |                   |
|        |           |            |  St.  [place],   |                   |
|        |           |            | [place] [address]             |                   |
|        |           |            | [phone]         |                   |
|        |           |            | [phone] (Fax)   |                   |
+--------+-----------+------------+----------------------+-------------------+
| / | Surgery   | Radiology  |   Popeye Townsend, | CV EP PPM SYSTEM  |
|    |           |            |  MD  [address] | IMPLANT           |
|        |           |            |  St.  [place],   |                   |
|        |           |            | [place] [address]             |                   |
|        |           |            | [phone]         |                   |
|        |           |            | [phone] (Fax)   |                   |
+--------+-----------+------------+----------------------+-------------------+
 
| 02/10/ | Clinical  | Cardiology |                      |                   |
|    | Support   |            |                      |                   |
+--------+-----------+------------+----------------------+-------------------+
| / | Office    | Cardiology |   Tonia Wilsno,    |                   |
|    | Visit     |            | BELKIS  [address]   |                   |
|        |           |            | [place]  |                   |
|        |           |            | [address]  [phone]  |                   |
|        |           |            |  [phone] (Fax)  |                   |
+--------+-----------+------------+----------------------+-------------------+
| / | Off-Site  | Nephrology |   Marzena Moser  |                   |
2020   | Visit     |            | DO ETIENNE  [address] [place] [address]      |                   |
|        |           |            | [address]      |                   |
|        |           |            | [place]      |                   |
|        |           |            | [address]  [phone]  |                   |
|        |           |            |  [phone] (Fax)  |                   |
+--------+-----------+------------+----------------------+-------------------+
 documented as of this encounter
 
 Visit Diagnoses
 Not on filedocumented in this encounter

## 2020-01-08 NOTE — XMS
Encounter Summary
  Created on: 2020
 
 Viviana Ricci
 External Reference #: [id number]
 : 47
 Sex: Female
 
 Demographics
 
 
+-----------------------+----------------------+
| Address               | [address]      |
|                       | [place]  [address] |
+-----------------------+----------------------+
| Home Phone            | [phone]      |
+-----------------------+----------------------+
| Preferred Language    | Unknown              |
+-----------------------+----------------------+
| Marital Status        | Single               |
+-----------------------+----------------------+
| Christian Affiliation | [id number]                 |
+-----------------------+----------------------+
| Race                  | Unknown              |
+-----------------------+----------------------+
| Ethnic Group          | Unknown              |
+-----------------------+----------------------+
 
 
 Author
 
 
+--------------+--------------------------------------------+
| Author       | [facility]  |
|              | and [place]                                |
+--------------+--------------------------------------------+
| Organization | [facility]  |
|              | and [place]                                |
+--------------+--------------------------------------------+
| Address      | Unknown                                    |
+--------------+--------------------------------------------+
| Phone        | Unavailable                                |
+--------------+--------------------------------------------+
 
 
 
 Support
 
 
+----------------+--------------+---------------------+-----------------+
| Name           | Relationship | Address             | Phone           |
+----------------+--------------+---------------------+-----------------+
| Ada/Ed Radhames | ECON         | [place]              | [phone] |
|                |              | [place]  |                 |
|                |              |  [address]              |                 |
+----------------+--------------+---------------------+-----------------+
 
 
 
 
 Care Team Providers
 
 
+-----------------------+------+-------------+
| Care Team Member Name | Role | Phone       |
+-----------------------+------+-------------+
 PCP  | Unavailable |
+-----------------------+------+-------------+
 
 
 
 Reason for Visit
 
 
+-------------------+----------+
| Reason            | Comments |
+-------------------+----------+
| Medication Refill |          |
+-------------------+----------+
 
 
 
 Encounter Details
 
 
+--------+--------+---------------------+----------------------+-------------------+
| Date   | Type   | Department          | Care Team            | Description       |
+--------+--------+---------------------+----------------------+-------------------+
| / | Refill |   [facility]         |   Marzena Moser  | Medication Refill |
|    |        | NEPHROLOGY  [address]   | M, DO  [address]      |                   |
|        |        | [address]   | [address]      |                   |
|        |        | [place], [place]     | [place], [place]      |                   |
|        |        | [address]          | [address]  [phone]  |                   |
|        |        | [phone]        |  [phone] (Fax)  |                   |
+--------+--------+---------------------+----------------------+-------------------+
 
 
 
 Social History
 
 
+--------------+-------+-----------+--------+------+
| Tobacco Use  | Types | Packs/Day | Years  | Date |
|              |       |           | Used   |      |
+--------------+-------+-----------+--------+------+
| Never Smoker |       |           |        |      |
+--------------+-------+-----------+--------+------+
 
 
 
+---------------------+---+---+---+
| Smokeless Tobacco:  |   |   |   |
| Never Used          |   |   |   |
+---------------------+---+---+---+
 
 
 
+---------------------------------------------------------------+
| Comments: some second hand smoke exposure, but fairly minimal |
 
+---------------------------------------------------------------+
 
 
 
+-------------+-------------+---------+----------+
| Alcohol Use | Drinks/Week | oz/Week | Comments |
+-------------+-------------+---------+----------+
| No          |             |         |          |
+-------------+-------------+---------+----------+
 
 
 
+------------------+---------------+
| Sex Assigned at  | Date Recorded |
| Birth            |               |
+------------------+---------------+
| Not on file      |               |
+------------------+---------------+
 
 
 
+----------------+-------------+-------------+
| Job Start Date | Occupation  | Industry    |
+----------------+-------------+-------------+
| Not on file    | Not on file | Not on file |
+----------------+-------------+-------------+
 
 
 
+----------------+--------------+------------+
| Travel History | Travel Start | Travel End |
+----------------+--------------+------------+
 
 
 
+-------------------------------------+
| No recent travel history available. |
+-------------------------------------+
 documented as of this encounter
 
 Plan of Treatment
 
 
+--------+-----------+------------+----------------------+-------------------+
| Date   | Type      | Specialty  | Care Team            | Description       |
+--------+-----------+------------+----------------------+-------------------+
| / | Office    | Cardiology |   HellalfredoTonia,    |                   |
|    | Visit     |            | ARNP  [address]   |                   |
|        |           |            | St  [place], [place]  |                   |
|        |           |            | [address]  [phone]  |                   |
|        |           |            |  [phone] (Fax)  |                   |
+--------+-----------+------------+----------------------+-------------------+
| / | Hospital  | Radiology  |   Popeye Townsend, |                   |
|    | Encounter |            |  MD  [address] |                   |
|        |           |            |  St.  [place],   |                   |
|        |           |            | [place] [address]             |                   |
|        |           |            | [phone]         |                   |
|        |           |            | [phone] (Fax)   |                   |
+--------+-----------+------------+----------------------+-------------------+
| / | Surgery   | Radiology  |   Popeye Townsend, | CV EP PPM SYSTEM  |
 
|    |           |            |  MD  [address] | IMPLANT           |
|        |           |            |  St.  [place],   |                   |
|        |           |            | [place] [address]             |                   |
|        |           |            | [phone]         |                   |
|        |           |            | [phone] (Fax)   |                   |
+--------+-----------+------------+----------------------+-------------------+
| 02/10/ | Clinical  | Cardiology |                      |                   |
|    | Support   |            |                      |                   |
+--------+-----------+------------+----------------------+-------------------+
| / | Office    | Cardiology |   Tonia Wilson,    |                   |
|    | Visit     |            | ARNP  [address]   |                   |
|        |           |            | [place]  |                   |
|        |           |            | [address]  [phone]  |                   |
|        |           |            |  [phone] (Fax)  |                   |
+--------+-----------+------------+----------------------+-------------------+
| / | Off-Site  | Nephrology |   Marzena Moser  |                   |
|    | Visit     |            | DO ETIENNE  [address] [place] [address]      |                   |
|        |           |            | [address]      |                   |
|        |           |            | [place]      |                   |
|        |           |            | [address]  [phone]  |                   |
|        |           |            |  [phone] (Fax)  |                   |
+--------+-----------+------------+----------------------+-------------------+
 documented as of this encounter
 
 Visit Diagnoses
 Not on filedocumented in this encounter

## 2020-01-08 NOTE — XMS
Encounter Summary
  Created on: 2020
 
 Viviana Ricci
 External Reference #: [id number]
 : 47
 Sex: Female
 
 Demographics
 
 
+-----------------------+----------------------+
| Address               | [address]      |
|                       | [place]  [address] |
+-----------------------+----------------------+
| Home Phone            | [phone]      |
+-----------------------+----------------------+
| Preferred Language    | Unknown              |
+-----------------------+----------------------+
| Marital Status        | Single               |
+-----------------------+----------------------+
| Jewish Affiliation | 1009                 |
+-----------------------+----------------------+
| Race                  | Unknown              |
+-----------------------+----------------------+
| Ethnic Group          | Unknown              |
+-----------------------+----------------------+
 
 
 Author
 
 
+--------------+--------------------------------------------+
| Author       | [facility]  |
|              | and [facility]                                |
+--------------+--------------------------------------------+
| Organization | [facility]  |
|              | and [facility]                                |
+--------------+--------------------------------------------+
| Address      | Unknown                                    |
+--------------+--------------------------------------------+
| Phone        | Unavailable                                |
+--------------+--------------------------------------------+
 
 
 
 Support
 
 
+----------------+--------------+---------------------+-----------------+
| Name           | Relationship | Address             | Phone           |
+----------------+--------------+---------------------+-----------------+
| Ada/Ed Radhames | ECON         | [place]              | [phone] |
|                |              | [place]  |                 |
|                |              |  [address]              |                 |
+----------------+--------------+---------------------+-----------------+
 
 
 
 
 Care Team Providers
 
 
+-----------------------+------+-------------+
| Care Team Member Name | Role | Phone       |
+-----------------------+------+-------------+
 PCP  | Unavailable |
+-----------------------+------+-------------+
 
 
 
 Encounter Details
 
 
+--------+----------+---------------------+----------------------+-------------+
| Date   | Type     | Department          | Care Team            | Description |
+--------+----------+---------------------+----------------------+-------------+
| / | Abstract |   [facility] [place]         |   Marzena Moser  |             |
|    |          | NEPHROLOGY  [address]   | M, DO  [address]      |             |
|        |          | [address]   | [address]      |             |
|        |          | [place], [place]     | [place]      |             |
|        |          | [address]          | [address]  [phone]  |             |
|        |          | [phone]        |  [phone] (Fax)  |             |
+--------+----------+---------------------+----------------------+-------------+
 
 
 
 Social History
 
 
+--------------+-------+-----------+--------+------+
| Tobacco Use  | Types | Packs/Day | Years  | Date |
|              |       |           | Used   |      |
+--------------+-------+-----------+--------+------+
| Never Smoker |       |           |        |      |
+--------------+-------+-----------+--------+------+
 
 
 
+---------------------+---+---+---+
| Smokeless Tobacco:  |   |   |   |
| Never Used          |   |   |   |
+---------------------+---+---+---+
 
 
 
+---------------------------------------------------------------+
| Comments: some second hand smoke exposure, but fairly minimal |
+---------------------------------------------------------------+
 
 
 
+-------------+-------------+---------+----------+
| Alcohol Use | Drinks/Week | oz/Week | Comments |
+-------------+-------------+---------+----------+
| No          |             |         |          |
+-------------+-------------+---------+----------+
 
 
 
 
+------------------+---------------+
| Sex Assigned at  | Date Recorded |
| Birth            |               |
+------------------+---------------+
| Not on file      |               |
+------------------+---------------+
 
 
 
+----------------+-------------+-------------+
| Job Start Date | Occupation  | Industry    |
+----------------+-------------+-------------+
| Not on file    | Not on file | Not on file |
+----------------+-------------+-------------+
 
 
 
+----------------+--------------+------------+
| Travel History | Travel Start | Travel End |
+----------------+--------------+------------+
 
 
 
+-------------------------------------+
| No recent travel history available. |
+-------------------------------------+
 documented as of this encounter
 
 Plan of Treatment
 
 
+--------+-----------+------------+----------------------+-------------------+
| Date   | Type      | Specialty  | Care Team            | Description       |
+--------+-----------+------------+----------------------+-------------------+
| / | Office    | Cardiology |   Tonia Wilson,    |                   |
|    | Visit     |            | ARNJESSICA  [address]   |                   |
|        |           |            | [place]  |                   |
|        |           |            | [address]  [phone]  |                   |
|        |           |            |  [phone] (Fax)  |                   |
+--------+-----------+------------+----------------------+-------------------+
| / | Hospital  | Radiology  |   Popeye Townsend, |                   |
|    | Encounter |            |  MD  [address] |                   |
|        |           |            |  St.  [place],   |                   |
|        |           |            | [place] [address]             |                   |
|        |           |            | [phone]         |                   |
|        |           |            | [phone] (Fax)   |                   |
+--------+-----------+------------+----------------------+-------------------+
| / | Surgery   | Radiology  |   Popeye Townsend, | CV EP PPM SYSTEM  |
|    |           |            |  MD  [address] | IMPLANT           |
|        |           |            |  St.  [place],   |                   |
|        |           |            | [place] [address]             |                   |
|        |           |            | [phone]         |                   |
|        |           |            | [phone] (Fax)   |                   |
+--------+-----------+------------+----------------------+-------------------+
| 02/10/ | Clinical  | Cardiology |                      |                   |
|    | Support   |            |                      |                   |
+--------+-----------+------------+----------------------+-------------------+
| / | Office    | Cardiology |   Tonia Wilson,    |                   |
|    | Visit     |            | ARNP  [address]   |                   |
 
|        |           |            | St  [place], [place]  |                   |
|        |           |            | [address]  [phone]  |                   |
|        |           |            |  [phone] (Fax)  |                   |
+--------+-----------+------------+----------------------+-------------------+
| / | Off-Site  | Nephrology |   Marzena Moser  |                   |
|    | Visit     |            | DO ETIENNE  [address] [place] [address]      |                   |
|        |           |            | [address]      |                   |
|        |           |            | [place]      |                   |
|        |           |            | [address]  [phone]  |                   |
|        |           |            |  [phone] (Fax)  |                   |
+--------+-----------+------------+----------------------+-------------------+
 documented as of this encounter
 
 Procedures
 
 
+----------------+--------+------------+----------------------+----------------------+
| Procedure Name | Priori | Date/Time  | Associated Diagnosis | Comments             |
|                | ty     |            |                      |                      |
+----------------+--------+------------+----------------------+----------------------+
| EXTERNAL LAB:  | Routin | 2015 |                      |   Results for this   |
| URINALYSIS     | e      |            |                      | procedure are in the |
|                |        |            |                      |  results section.    |
+----------------+--------+------------+----------------------+----------------------+
| URINALYSIS     | Routin | 2015 |                      |   Results for this   |
|                | e      |            |                      | procedure are in the |
|                |        |            |                      |  results section.    |
+----------------+--------+------------+----------------------+----------------------+
 documented in this encounter
 
 Results
 Urinalysis (2015)
 
+-----------+--------+------------+-------------+--------------+
| Component | Value  | Ref Range  | Performed   | Pathologist  |
|           |        |            | At          | Signature    |
+-----------+--------+------------+-------------+--------------+
| WBC UA    | 50 (A) | 0 - 4 /HPF | [facility]  |              |
|           |        |            | [facility]    |              |
|           |        |            | [facility]     |              |
|           |        |            | [facility] -    |              |
|           |        |            | LABORATORY  |              |
+-----------+--------+------------+-------------+--------------+
 
 
 
+-----------------+
| Specimen        |
+-----------------+
| Urine specimen  |
| (specimen)      |
+-----------------+
 
 
 
+----------------------+--------------------+--------------------+----------------+
| Performing           | Address            | City/State/Zipcode | Phone Number   |
| Organization         |                    |                    |                |
+----------------------+--------------------+--------------------+----------------+
|   [facility]     |   [address] | [place]    |   [phone] |
 
| [facility]  |                    | [address]              |                |
| - LABORATORY         |                    |                    |                |
+----------------------+--------------------+--------------------+----------------+
 External Lab: Urinalysis (2015)
 
+-------------+----------+--------------+------------+--------------+
| Component   | Value    | Ref Range    | Performed  | Pathologist  |
|             |          |              | At         | Signature    |
+-------------+----------+--------------+------------+--------------+
| UA Blood,   | negative |              | EXTERNAL   |              |
| External    |          |              | LAB        |              |
+-------------+----------+--------------+------------+--------------+
| UA Glucose, | normal   |              | EXTERNAL   |              |
|  External   |          |              | LAB        |              |
+-------------+----------+--------------+------------+--------------+
| UA Ketones, | negative |              | EXTERNAL   |              |
|  External   |          |              | LAB        |              |
+-------------+----------+--------------+------------+--------------+
| UA Ph,      | 6        | 5 - 9        | EXTERNAL   |              |
| External    |          |              | LAB        |              |
+-------------+----------+--------------+------------+--------------+
| UA          | 25 (A)   | 0            | EXTERNAL   |              |
| Proteins,   |          |              | LAB        |              |
| External    |          |              |            |              |
+-------------+----------+--------------+------------+--------------+
| UA RBC,     | 0        | 0            | EXTERNAL   |              |
| External    |          |              | LAB        |              |
+-------------+----------+--------------+------------+--------------+
| UA Specific | 1.013    | 1.005 - 1.03 | EXTERNAL   |              |
|  Gravity,   |          |              | LAB        |              |
| External    |          |              |            |              |
+-------------+----------+--------------+------------+--------------+
| UA          | 25 (A)   | 0            | EXTERNAL   |              |
| Leukocyte   |          |              | LAB        |              |
| Esterase,   |          |              |            |              |
| External    |          |              |            |              |
+-------------+----------+--------------+------------+--------------+
 
 
 
+----------------+---------+--------------------+--------------+
| Performing     | Address | City/State/Zipcode | Phone Number |
| Organization   |         |                    |              |
+----------------+---------+--------------------+--------------+
|   EXTERNAL LAB |         |                    |              |
+----------------+---------+--------------------+--------------+
 documented in this encounter
 
 Visit Diagnoses
 Not on filedocumented in this encounter

## 2020-01-08 NOTE — XMS
Encounter Summary
  Created on: 2020
 
 Viviana Ricci
 External Reference #: [id number]
 : 47
 Sex: Female
 
 Demographics
 
 
+-----------------------+----------------------+
| Address               | [address]      |
|                       | [place]  [address] |
+-----------------------+----------------------+
| Home Phone            | [phone]      |
+-----------------------+----------------------+
| Preferred Language    | Unknown              |
+-----------------------+----------------------+
| Marital Status        | Single               |
+-----------------------+----------------------+
| Pentecostal Affiliation | [id number]                 |
+-----------------------+----------------------+
| Race                  | Unknown              |
+-----------------------+----------------------+
| Ethnic Group          | Unknown              |
+-----------------------+----------------------+
 
 
 Author
 
 
+--------------+--------------------------------------------+
| Author       | [facility]  |
|              | and [facility]                                |
+--------------+--------------------------------------------+
| Organization | [facility]  |
|              | and [facility]                                |
+--------------+--------------------------------------------+
| Address      | Unknown                                    |
+--------------+--------------------------------------------+
| Phone        | Unavailable                                |
+--------------+--------------------------------------------+
 
 
 
 Support
 
 
+----------------+--------------+---------------------+-----------------+
| Name           | Relationship | Address             | Phone           |
+----------------+--------------+---------------------+-----------------+
| Ada/Ed Radhames | ECON         | [address]              | [phone] |
|                |              | [place]  |                 |
|                |              |  [id number]              |                 |
+----------------+--------------+---------------------+-----------------+
 
 
 
 
 Care Team Providers
 
 
+------------------------+------+-----------------+
| Care Team Member Name  | Role | Phone           |
+------------------------+------+-----------------+
| Marzena Moser DO | PCP  | [phone] |
+------------------------+------+-----------------+
 
 
 
 Reason for Visit
 
 
+--------------------+----------+
| Reason             | Comments |
+--------------------+----------+
| Follow-up, Office  |          |
| Visit              |          |
+--------------------+----------+
| Coronary Artery    |          |
| Disease            |          |
+--------------------+----------+
| Hypertension       |          |
+--------------------+----------+
| Chest Pain         |          |
+--------------------+----------+
| Heart Murmur       |          |
+--------------------+----------+
| Hyperlipidemia     |          |
+--------------------+----------+
 
 
 
 Encounter Details
 
 
+--------+---------+----------------------+----------------------+----------------------+
| Date   | Type    | Department           | Care Team            | Description          |
+--------+---------+----------------------+----------------------+----------------------+
| / | Office  |   [facility]          |   Tonia Wilson,    | Pulmonary            |
| 2019   | Visit   | CARDIOLOGY  [address]    | ARNP  [address]   | hypertension (HCC)   |
|        |         | [address]  [place]  | (Primary Dx);        |
|        |         |  [place] [address]       | [address] [phone]  | Coronary artery      |
|        |         | [phone]         |  [phone] (Fax)  | disease involving    |
|        |         |                      |                      | native coronary      |
|        |         |                      |                      | artery of native     |
|        |         |                      |                      | heart without angina |
|        |         |                      |                      |  pectoris; Murmur;   |
|        |         |                      |                      | Cardiomegaly;        |
|        |         |                      |                      | Hypertension,        |
|        |         |                      |                      | essential; Chest     |
|        |         |                      |                      | pain, unspecified    |
|        |         |                      |                      | type; Mixed          |
|        |         |                      |                      | hyperlipidemia;      |
|        |         |                      |                      | Persistent atrial    |
|        |         |                      |                      | fibrillation; NSVT   |
|        |         |                      |                      | (nonsustained        |
|        |         |                      |                      | ventricular          |
 
|        |         |                      |                      | tachycardia) (HCC)   |
|        |         |                      |                      | and ventricular      |
|        |         |                      |                      | ectopy               |
+--------+---------+----------------------+----------------------+----------------------+
 
 
 
 Social History
 
 
+--------------+-------+-----------+--------+------+
| Tobacco Use  | Types | Packs/Day | Years  | Date |
|              |       |           | Used   |      |
+--------------+-------+-----------+--------+------+
| Never Smoker |       |           |        |      |
+--------------+-------+-----------+--------+------+
 
 
 
+---------------------+---+---+---+
| Smokeless Tobacco:  |   |   |   |
| Never Used          |   |   |   |
+---------------------+---+---+---+
 
 
 
+---------------------------------------------------------------+
| Comments: some second hand smoke exposure, but fairly minimal |
+---------------------------------------------------------------+
 
 
 
+-------------+-------------+---------+----------+
| Alcohol Use | Drinks/Week | oz/Week | Comments |
+-------------+-------------+---------+----------+
| No          |             |         |          |
+-------------+-------------+---------+----------+
 
 
 
+------------------+---------------+
| Sex Assigned at  | Date Recorded |
| Birth            |               |
+------------------+---------------+
| Not on file      |               |
+------------------+---------------+
 
 
 
+----------------+-------------+-------------+
| Job Start Date | Occupation  | Industry    |
+----------------+-------------+-------------+
| Not on file    | Not on file | Not on file |
+----------------+-------------+-------------+
 
 
 
+----------------+--------------+------------+
| Travel History | Travel Start | Travel End |
+----------------+--------------+------------+
 
 
 
 
+-------------------------------------+
| No recent travel history available. |
+-------------------------------------+
 documented as of this encounter
 
 Last Filed Vital Signs
 
 
+-------------------+-------------------+----------------------+----------------------+
| Vital Sign        | Reading           | Time Taken           | Comments             |
+-------------------+-------------------+----------------------+----------------------+
| Blood Pressure    | 92/64             | 2019 11:44 AM  |                      |
|                   |                   | PST                  |                      |
+-------------------+-------------------+----------------------+----------------------+
| Pulse             | 42                | 2019 11:44 AM  | irregular Manual: 63 |
|                   |                   | PST                  |  Pulse Ox            |
+-------------------+-------------------+----------------------+----------------------+
| Temperature       | -                 | -                    |                      |
+-------------------+-------------------+----------------------+----------------------+
| Respiratory Rate  | 18                | 2019 11:44 AM  |                      |
|                   |                   | PST                  |                      |
+-------------------+-------------------+----------------------+----------------------+
| Oxygen Saturation | 96%               | 2019 11:44 AM  |                      |
|                   |                   | PST                  |                      |
+-------------------+-------------------+----------------------+----------------------+
| Inhaled Oxygen    | -                 | -                    |                      |
| Concentration     |                   |                      |                      |
+-------------------+-------------------+----------------------+----------------------+
| Weight            | 115.5 kg (254 lb  | 2019 11:44 AM  |                      |
|                   | 10.1 oz)          | PST                  |                      |
+-------------------+-------------------+----------------------+----------------------+
| Height            | 167.6 cm (5' 6")  | 2019 11:44 AM  |                      |
|                   |                   | PST                  |                      |
+-------------------+-------------------+----------------------+----------------------+
| Body Mass Index   | 41.1              | 2019 11:44 AM  |                      |
|                   |                   | PST                  |                      |
+-------------------+-------------------+----------------------+----------------------+
 documented in this encounter
 
 Patient Instructions
 Patient Instructions Enid Shah RN - 2019 11:30 AM PSTProcedure: Pacemaker I
mplantation
 
 Date:____________________________________________
 
 Check-In Time:____________________________________
 
 1. Check-In at [facility].
 2. Do not eat or drink anything after midnight the night prior to the procedure.
 3. Please hold: Eliquis for two days prior to procedure.
 4. Take all of your regular medications with a sip of water the morning of the procedure ex
cept Eliquis as listed above..
 5. For Implant you will stay the night in ICU, for Battery/Generator change or Loop recorde
r you will be able to go home the same day.  You will need someone to drive you home.
 
   Wound Check: Nurse only - At [facility], 4th floor      Cardiology
 
 
              Date:_____________________________________
 
             Check-in Time:_____________________________
  
 
 Initial/Next Device check will be in 3 months:
  Provider: BELKIS Arredondo
 
  Date:_______________________________________
  
  Check-In Time:_______________________________
  
 Electronically signed by Enid Shah RN at 2019 12:57 PM PST
 documented in this encounter
 
 Progress Notes
 Tonia Wilson ARNP - 2019 11:30 AM PSTFormatting of this note might be different fr
om the original.
    
 
 PATIENT NAME:  Viviana Ricci  
 : 1945:         AGE: 73 y.o.
  PRIMARY CARE:
 Marzena Moser DO
 CC:    
 
 OUTPATIENT FOLLOW UP VISIT
 
 Date of Service: 2019 
 
 HISTORY OF PRESENT ILLNESS:
 Viviana Ricci is a 73 y.o. female with a history of coronary artery disease post CABG x 3 
in , history of diabetes, renal failure post a renal transplantation, morbid obesity, in
activity, hypertension, hyperlipidemia, pulmonary hypertension and severe arthritis.  She is
 being seen today for follow up coronary artery disease.
 
 She was last seen 10/10/2019 at which time she was scheduled for a 14 day mobile cardiac te
lemetry, scheduled for a persantine nuclear medicine stress test, scheduled for an echocardi
ogram, referred to cardiac rehab, and she was started on Eliquis 5 mg  twice daily for strok
e prevention..  Since that time, she has completed all testing ordered from her last visit. 
 NM Nuclear Stress Test was performed on 10/30/2019 showed an ejection fraction of 63%.  Ech
ocardiogram was completed on 10/30/2019 which showed an ejection fraction of 55-60%, mildly 
thickened and calcified trileaflet and mitral valves, mild tricuspid valve regurgitations. M
obile cardiac telemetry from 10/16/2019 to 10/30/2019 showed atrial fibrillation 33 to 149 b
eats per minute, PVC's and ventricular couplets, 4 beat run of nonsustained ventricular tach
ycardia, a rapid ventricular response with a heart rate of 149 beats per minute, and pauses 
up to 3.8 seconds.   Today she notes that her main complaint is her back pain.
 
 She has had a poor energy level.  She has not been very active.  She enjoys watch plays, [employer], and spending time with family and friends in her spare time.  She has not had any ches
t pain or discomfort at rest or with exertion.   She has had shortness of breath with minima
l exertion. She attributes this to her pulmonary hypertension. Currently however she can onl
y walk about 50 feet before she feels she needs to stop to take a breath, whereas last year 
she could walk through the whole parking lot into the [employer], nearly a block, before she fel
t she needed to stop to rest.  She has dizziness with on  from 11-12:30.. She notes sh
rosario had pushed herself to go to [employer] despite severe pain in back all the way up to her neck,
 and that started to make her feel lightheaded / dizzy. This caused her to shake, which her 
fellow parishioners noted and they brought her water and then she felt better by 12:30.
 
 She has not noticed palpitations. She knows she has abnormal beats, but she never has been 
 
able to feel them. She has had leg swelling bilaterally, more on the left than right.  She s
tates the left is more swollen than the right because of blood clot.  She sleeps on 1 pillow
 at night without any shortness of breath.  She sleeps with CPAP machine in place nightly wi
th oxygen bled in at 2 liters per minute.  She states she wakes up most of the time rested, 
with an average of 6-8 hours of sleep per night.
 
 MEDICAL, SURGICAL, AND PERSONAL HISTORY
 Past Medical, Surgical, Family, and Social History are reviewed in EPIC.
 
 CURRENT PROBLEMS
 Patient Active Problem List 
 Diagnosis 
   Chronic low back pain 
   Hypothyroidism 
   Mixed hyperlipidemia 
   Complications of transplanted kidney 
   Movement disorder 
   Diabetes mellitus type II, uncontrolled  
   Pulmonary hypertension  
   Coronary artery disease involving native coronary artery of native heart without angina
 pectoris 
   JACK on CPAP 
   Obesity hypoventilation syndrome  
   Kidney replaced by transplant 
   Secondary hyperparathyroidism  
   Dyspnea 
   FSGS (focal segmental glomerulosclerosis) 
   Murmur 
   Cardiomegaly 
   Hypertension, essential 
   PLMD (periodic limb movement disorder) 
   Chest pain 
   UTI (lower urinary tract infection) 
   Renal transplant recipient 
   Thyroid activity decreased 
   Type 2 DM with CKD stage 2 and hypertension  
   Persistent atrial fibrillation 
   NSVT (nonsustained ventricular tachycardia)  and ventricular ectopy 
 
 CURRENT MEDICATIONS
 Current Outpatient Medications 
 Medication Sig Dispense Refill 
   allopurinol (ZYLOPRIM) 100 mg tablet take 1 tablet by mouth once daily 30 tablet 11 
   apixaban (ELIQUIS) 5 mg tablet Take 1 tablet by mouth 2 times daily. 180 tablet 3 
   aspirin 81 mg EC tablet Take 81 mg by mouth Daily.   
   B-D INS SYRINGE 0.5CC/31GX5/16 31G X 5/16" 0.5 ML MISC USE BEFORE MEALS AS DIRECTED 1 e
ach 11 
   cholecalciferol (VITAMIN D-3) 2000 UNITS TABS Take 2,000 Units by mouth Every other day
.   
   cinacalcet (SENSIPAR) 30 mg tablet take 1 tablet by mouth once daily 90 tablet 3 
   cyclobenzaprine (FLEXERIL) 10 mg tablet Take 1 tablet by mouth Twice  daily as needed f
or Muscle spasms. 45 tablet 0 
   fludrocortisone (FLORINEF) 0.1 mg tablet Take 1 tablet by mouth Every other day. 45 tab
let 3 
   furosemide (LASIX) 40 mg tablet Take 1 tablet by mouth Daily as needed. 30 tablet 11 
   glucose blood VI test strips (ONE TOUCH ULTRA TEST) strip Check blood sugar before each
 meal and as directed 100 each 12 
   insulin glargine (LANTUS) 100 units/mL injection (vial) inject 20 units subcutaneously 
every morning 10 vial 11 
   insulin lispro (HUMALOG) 100 units/mL injection (vial) inject subcutaneously BEFORE CHIKA
 
LS ACCORDING TO SLIDING SCALE Max dose 20 units daily (Patient taking differently: inject henry
bcutaneously BEFORE MEALS ACCORDING TO SLIDING SCALE Max dose 8 units daily) 10 vial 11 
   levothyroxine (SYNTHROID) 50 mcg tablet take 1 tablet by mouth once daily 30 tablet 11 
   lisinopril (PRINIVIL,ZESTRIL) 30 MG tablet take 1 tablet by mouth once daily 90 tablet 
3 
   loperamide (ANTI-DIARRHEAL) 2 mg capsule Take 1 capsule by mouth 4 times daily as neede
d. 60 capsule 5 
   losartan (COZAAR) 50 mg tablet take 1 tablet by mouth once daily 90 tablet 3 
   magnesium oxide (MAG-OX) 400 mg tablet take 1 tablet by mouth twice a day 60 tablet 11 
   mycophenolate (CELLCEPT) 250 mg capsule Take 1 capsule by mouth 2 times daily. 60 capsu
le 11 
   predniSONE (DELTASONE) 5 mg tablet take 1 tablet by mouth once daily 90 tablet 3 
   Prenatal Vit-Fe Fumarate-FA (PNV PRENATAL PLUS MULTIVITAMIN) 27-1 MG TABS take 1 tablet
 by mouth once daily. 30 tablet 11 
   Respiratory Therapy Supplies MISC Continue nocturnal O2 at 2 l/m through CPAP for lifet
bart. Dx: JACK G47.33 Please provide new nasal mask for CPAP and any other needed replacement 
supplies. 1 each 0 
   Respiratory Therapy Supplies MISC Decrease CPAP to 12-18 cmH2O Diagnosis Code(s)327.23.
 Please send order to [facility]. 1 each 0 
   rosuvastatin (CRESTOR) 20 mg tablet take 1 tablet by mouth NIGHTLY 30 tablet 11 
   tacrolimus (PROGRAF) 1 mg capsule Take 1 capsule by mouth 2 times daily. For a total do
se of 1 mg, by mouth, 2 times daily. 240 capsule  
 
 No current facility-administered medications for this visit.  
  
 
 ALLERGIES
 Allergies 
 Allergen Reactions 
   Tape [Adhesive & Tape] Rash 
 
 ROS
 Review of Systems 
 Constitutional: Positive for malaise/fatigue (constant). 
 Respiratory: Positive for shortness of breath (with minimal exertion, worse with cold air).
  
 Cardiovascular: Positive for leg swelling (left more than right currently). Negative for ch
est pain and palpitations. 
 Neurological: Positive for dizziness ( while sitting
  with REALLY bad back pain). Negative for weakness. 
      Lightheadedness = No 
  
 
 OBJECTIVE:  
 
 PHYSICAL EXAM
 BP 92/64  | Pulse (!) 42 Comment: irregular Manual: 63 Pulse Ox | Resp 18  | Ht 1.676 m (5'
 6")  | Wt 115.5 kg (254 lb 10.1 oz)  | SpO2 96%  | Breastfeeding No  | BMI 41.10 kg/m 
 Physical Exam 
 Constitutional: She is oriented to person, place, and time. She appears well-developed and 
well-nourished. 
 Elderly female in no acute distress, arrived alone 
 Neck: Normal carotid pulses and no JVD present. Carotid bruit is not present. 
 Cardiovascular: Normal rate, regular rhythm, S1 normal, S2 normal and intact distal pulses.
 Frequent extrasystoles are present. PMI is not displaced. Exam reveals distant heart sounds
 (due to girth). Exam reveals no gallop and no friction rub. 
 Murmur heard.
  Holosystolic murmur is present with a grade of 1/6.
 Pulses:
      Carotid pulses are 2+ on the right side and 2+ on the left side.
 
      Posterior tibial pulses are 1+ on the right side and 1+ on the left side. 
 Pulmonary/Chest: Effort normal and breath sounds normal. No accessory muscle usage. No resp
iratory distress. She has no wheezes. She has no rhonchi. She has no rales. 
 Abdominal: Soft. Normal appearance and normal aorta. She exhibits no abdominal bruit. There
 is no hepatosplenomegaly (difficult to fully evaluate due to body habitus). There is no ten
derness. Musculoskeletal:    
    General: Edema (trace at right ankle, 1+ on left ankle) present. 
 
 Neurological: She is alert and oriented to person, place, and time. Gait (using front wheel
ed walker) abnormal. 
 Skin: Skin is warm and dry. No cyanosis. Nails show no clubbing. 
 Psychiatric: She has a normal mood and affect. Her mood appears not anxious. She does not e
xhibit a depressed mood. 
 Vitals reviewed.
 
 ECG: I personally independently reviewed ECG tracing during this visit (interpreted and cherise
led by another provider):
 Results for orders placed or performed in visit on 10/10/19 
 ECG 12 lead 
 Result Value Ref Range 
  INTERPRETATION TEXT   
   Possible atrial fib
 Left axis deviation
 Low voltage QRS
 Septal infarct , age undetermined
 Abnormal ECG
 When compared with ECG of 21-AUG-2018 15:01,
 Current undetermined rhythm precludes rhythm comparison, needs review
 ST now depressed in Lateral leads
 Nonspecific T wave abnormality, worse in Inferior leads
 Nonspecific T wave abnormality now evident in Lateral leads
 Confirmed by ERIC REYES, ABEBE ([id number]) on 10/10/2019 2:40:53 PM
  
   Which is compared to today's ECG 2019: atrial fibrillation with 2 ventricular ectop
ics noted, rate 78 beats per minute.
 
 LAB RESULTS reviewed during visit today primarily from [facility] and [facility]: 
 LIPID
 Lab Results 
 Component Value Date 
  CHOL 162 2013 
  TRIG 225 2013 
  HDL 45 2013 
  LDL 72 2013 
  LDLEX 51 2019 
  HDLEX 51 2019 
  TRIGEX 115 2019 
  CHOLEX 125 2019 
 
 CHEMISTRY
 Lab Results 
 Component Value Date 
   (H) 2018 
  GLUEX 89 2019 
   2018 
  NAEX 144 2019 
  K 5.3 (H) 2018 
  KEX 5.1 2019 
   (H) 2018 
 
  CLEX 110 2019 
  CO2 21 (L) 2018 
  CO2EX 22 2019 
  CALCIUM 9.8 2018 
  ALKPHOS 100 2014 
  AST 20 07/10/2013 
  ASTEX 46 2019 
  ALT 14 07/10/2013 
  ALTEX 37 2019 
  BILITOT 0.6 2014 
  CREA 1.48 (H) 2018 
  BUN 43 (H) 2018 
  EGFR 31.0 07/10/2013 
  EGFREX 35 2019 
  CREEX 1.47 2019 
 
 HEMATOLOGY
 Lab Results 
 Component Value Date 
  WBC 4.6 07/10/2013 
  WBCEX 5.6 2019 
  HGB 11.9 07/10/2013 
  HGBEX 13.9 2019 
  HCT 35.7 07/10/2013 
  HCTEX 42.6 2019 
   (A) 07/10/2013 
  PLTEX 176 2019 
  
 Lab Results 
 Component Value Date 
  TSH 1.26 2018 
  TSHEX 1.85 2019 
   (H) 2013 
   (H) 2013 
 
 RESULTS- I reviewed reports from [facility]:
 
 NM Nuclear Stress Test 10/30/2019: Persantine EKG is negative.  Normal Persantine sestamibi
 myocardial perfusion imaging study.  Normal left ventricular size, wall thickness and motio
n.  Preserved left ventricular systolic function.  LVEF by gated SPECT is 63%.
 
 Echocardiogram Completed 10/30/2019: Mild biatrial dilatation.  Normal left ventricular siz
e with a mild concentric left ventricular hypertrophy.  Left ventricular systolic function i
s preserved.  LVEF is 55-60%.  Mildly thickened and calcified trileaflet aortic valve with a
dequate opening.  There is aortic valve sclerosis without significant aortic valve stenosis.
  Mildly thickened and calcified mitral valve suggesting myxomatous change.  There is a mild
 mitral valve regurgitation.  Mild mitral annular calcification.  Mild tricuspid valve regur
gitation.  Normal right-sided pressure. Dilated IVC with a normal respiratory collapse.  Whe
n compared to echocardiography on 2018, no significant changes.
 
 Mobile cardiac telemetry from 10/16 until 10/30/2019:  Baseline EKG show atrial fibrillatio
n with heart rate ranging from 33 to 149 bpm. PVCs, ventricular couplets, 4 beat run nonsust
ained ventricular tachycardia was noted. Episodes of rapid ventricular response with heart
 rate of 149 bpm  was noted.Pauses up to 3.8-second were present. Findings suggest potential
 tacky/bradycardia syndrome.
 
 Above data and testing is reviewed this visit; testing below is historical data unless othe
rwise specified.
 ASSESSMENT:  
 
 
 1. Coronary artery disease:
 A.  Status post CABG x 3 in  with LIMA to the LAD, SVG to the OM1 and OM2.
             B.  A persantine sestamibi stress test on 07 revealed a medium sized, mod
erate in severity fixed defect of the anterior wall, and a small sized mild in severity fixe
d defect of the inferior wall, LVEF by gated SPECT was 66%.
             C.  Bilateral heart catheterization on 05/03/10, shows severe two vessel corona
ry artery disease, a chronic occlusion through the proximal portion of the left anterior jed
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
rtery, grafts: open left internal mammary artery graft to the mid left anterior descending a
rtery, open saphenous vein grafts to the OM1 and OM2, mildly dilated left ventricular cavity
 with a normal left systolic function, LVEF 70%, mild to moderate pulmonary hypertension and
 a normal cardiac output, coronary circulation is right dominant, normal system pressure.
             D.  Persantine nuclear medicine stress test 14 shows a normal myocardial p
erfusion imaging study with normal left ventricular size, wall thickness, LVEF by gated SPEC
T of 78%.  
             E. 10/10/2019, the ECG showed some changes with frequent PVCs, and based on thi
s it appears to be occurring approximately 40% of the time. With this as well as her increas
ed fatigue and shortness of breath, which was her anginal equivalent prior to her CABG, furt
her evaluation with Persantine nuclear medicine stress test is warranted.
  F. NM Nuclear Stress Test 10/30/2019: Persantine EKG is negative.  Normal Persantine sesta
mibi myocardial perfusion imaging study.  Normal left ventricular size, wall thickness and m
otion.  Preserved left ventricular systolic function.  LVEF by gated SPECT is 63%.
  G. Echocardiogram Completed 10/30/2019: Mild biatrial dilatation.  Normal left ventricular
 size with a mild concentric left ventricular hypertrophy.  Left ventricular systolic functi
on is preserved.  LVEF is 55-60%.  Mildly thickened and calcified trileaflet aortic valve wi
th adequate opening.  There is aortic valve sclerosis without significant aortic valve steno
sis.  Mildly thickened and calcified mitral valve suggesting myxomatous change.  There is a 
mild mitral valve regurgitation.  Mild mitral annular calcification.  Mild tricuspid valve r
egurgitation.  Normal right-sided pressure. Dilated IVC with a normal respiratory collapse. 
 When compared to echocardiography on 2018, no significant changes.
  H. Today, 2019, she is asymptomatic. She is on a medical regimen with aspirin, ARB, 
ACE-I and statin. 
  There are no signs or symptoms of overt congestive heart failure, and her physical exam sh
ows no significant fluid retention.  She is in class III- Symptoms with minimal exertion of 
the New York Heart Association functional class. Heart failure stage A-pre-heart failure. 
 
 2. Persistent Atrial fibrillation with irreversible symptomatic tachycardia/bradycardia syn
drome: 
  A. Her ECG 10/10/19 shows undetermined arrhythmia that appears to be either atrial fibrill
ation or junctional rhythm with frequent unifocal PVCs.  
  B. Mobile cardiac telemetry from 10/16 until 10/30/2019. Baseline EKG show atrial fibrilla
tion with heart rate ranging from 33 to 149 bpm. PVCs, ventricular couplets, 4 beat run nons
ustained ventricular tachycardia was noted. Episodes of rapid ventricular response with he
art rate of 149 bpm  was noted.Pauses up to 3.8-second were present. Findings suggest potent
ial tacky/bradycardia syndrome.
  C. Her risks for stroke include: Hx of HTN (1), Diabetes (1), Vascular disease (1), Age 65
 to 74 (1) and Female Gender (1). Her [id number] score is 5, which gives an estimated 6.7%
 risk of stroke per year in atrial fibrillation.  Her bleeding risks include: >65 YO (1)  an
d NSAIDS/ASA (1).  Her HASBLED score is 2-3, which confers an intermediate risk (4.1-5.8%)  
risk of bleed per year. We will treat her as if she has atrial fibrillation for stroke preve
ntion, and we will start her on Eliquis 5 mg twice daily. She will have mobile cardiac telem
etry for 14 days to evaluate for atrial fibrillation and PVC burden.  She was given 6 weeks 
Eliquis 5 mg samples. Will not send in prescription until it is confirmed on her further kael
luation.
  Today, 2019, she was on rate control with metoprolol tartrate, however given her fidel
ses and significant bradycardia on mobile cardiac telemetry this was stopped about 3 weeks a
go.  She was still having some bradycardia after that on her monitor with rate less than 60 
beats per minute about 7 to 8 hours/day.  She also had a little bit of tachycardia after deshaun
t point with her rate up to 149 bpm.. Her atrial fibrillation is not ideally controlled and 
would benefit from medication adjustment.  However she is not able to tolerate any AV kristin 
 
blocking agents due to her tachycardia/bradycardia syndrome.  This is discussed with her at 
length.  She is recommended to have a permanent pacemaker implantation done to treat her bra
dycardia to allow us to restart her beta-blocker to treat her tachycardia as well as her hea
rt disease. The risks and benefits of the procedure including alternative treatment were dis
cussed with the patient in length. The patient decides to proceed with the procedure. Given 
that her atrial fibrillation is newly diagnosed, I do think she should have dual chamber dev
ice, as she is likely to have episodes of sinus rhythm, and may need the atrial lead.
   
 3. Unifocal PVC's:
  A. 10/10/2019, on the ECG shows more PVCs in office. Estimated burden would be 40%, which 
would be very concerning. She will be scheduled for stress test and mobile cardiac telemetry
 to evaluate etiology as well as burden.
  B. NM Nuclear Stress Test on 10/30/2019 showed no cardiac anomalies.
  C. Mobile Cardiac Telemetry on 10/16-10/30 showed PVC's, with ventricular couplets, 4 beat
 run non sustained ventricular tachycardia, rapid ventricular response with a heart rate of 
149 beats per minute, and with a pause of 3.8 second.  
  D. Today, 2019, she is not aware of these, however they continue on her ECG and are 
non-perfusing when checked on pulse, and adding back beta-blocker would be helpful for that 
as well. 
 
 4. Right sided heart failure/ cor pulmonale / severe pulmonary hypertension:
             A. The echocardiogram from 02/19/10 revealed moderate bi-atrial dilatation and 
normal left ventricular size, wall thickness and motion, LVEF is 55-60%, dilated right ventr
icle with moderate hypo-kinesis, moderate tricuspid valve regurgitation, severe pulmonary hy
pertension with a peak systolic pressure of 80 mmHg, and a small pericardial effusion. 
             B. Echocardiogram from 4/15/14 showed Mild left atrial dilatation. Normal left 
ventricular size, wall thickness and motion. Preserved  left ventricular systolic function. 
LVEF is 70-75%. Grade 1 left ventricular diastole dysfunction. Mild tricuspid valve regurgit
ation. Mild thickened trileaflet aortic valve with adequate opening. A mild aortic valve ins
ufficiency. Normal right-sided pressure.
             C. Echocardiogram 18 showing, moderate left atrial dilatation. Normal lef
t ventricular size with a mild concentric left ventricular hypertrophy.  Left ventricular sy
stolic dysfunction is preserved.  LVEF is 60-65%. Mild aortic root dilatation measuring 3.9 
cm in diameter, mildly thickened and calcified trileaflet aortic valve with adequate opening
. There is aortic valve sclerosis without significant aortic valve stenosis. Mildly thickene
d and calcified mitral valve with a mild mitral valve regurgitation, mild mitral annular claudia
cification. Normal right-sided pressure. Normal IVC with normal respiratory collapse. When c
ompared to echocardiography on 4/15/14, no significant changes.
             D. Echocardiogram 10/30/2019 showed: Normal right-sided pressure, and dilated I
VC with a normal respiratory collapse.
  E. Today, 2019, this is very well controlled.
  
 5. Hypertension, essential:
             A. Today, 2019, her blood pressure is on the low end of normal range.   
  
 6.  Hyperlipidemia, mixed.
             A. Today, 2019, she remains on rosuvastatin.
  
 7.  Obstructive sleep apnea: Not otherwise addressed today 2019. 
             A. She sleeps with CPAP machine in place nightly with oxygen bled in at 2 liter
s per minute.
  
 8.  Morbid obesity.
             A. Today, 2019, Body mass index is 41.1 kg/m.. This is improved since h
er last visit.  
  
 9. Type II diabetes:  Not otherwise addressed today 2019. 
  
 10. Chronic kidney disease: Not otherwise addressed today 2019.  
             A.  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 Followed by Dr. Moser.
 
  
 11.  DVT left leg 2015: Not otherwise addressed today 2019. 
             A.  She took a one year course of apixaban, then on aspirin alone. Her apixaban
 was restarted 10/2019 due to atrial fibrillation.
  
 12.  Hypothyroidism:  Not otherwise addressed today 2019. 
 
 PLAN:  
 
 1. She will be scheduled for dual chamber permanent pacemaker implantation in the near futu
re for her tachy/richard syndrome. It will be a dual chamber device as her atrial fibrillation
 was only recently diagnosed and she may have periods of sinus rhythm as well.
 2. Will plan to restart her metoprolol tartrate 25 mg twice daily after implant to treat he
r atrial fibrillation and ventricular ectopics.
 3. She will have wound check with office RN 1 week post procedure.
 4. She will follow up in 3 months  for office visit and device interrogation, or sooner wit
h concerns.  
 
 Electronically signed by: 
 BELKIS Arredondo on 2019 
 
 Low MENDEZ, Medical Assistant am acting as a scribe on behalf of, and in the presenc
e of BELKIS Arredondo. - Low Pastrana Medical Assistant 2019 11:51 AM  
 I, BELKIS Arredondo, personally performed the services described in this documentation, 
as scribed in my presence and it is both accurate and complete. BELKIS Arredondo 20
19  
 Portions of this chart may have been created with Dragon voice recognition software. Occasi
onal wrong-word or   sound-alike  substitutions may have occurred due to the inherent naqvi
itations of voice recognition software. Please read the chart carefully and recognize, using
 context, where these substitutions have occurred.  
 Electronically signed by BELKIS Arredondo at 2019  2:24 PM PSTdocumented in this e
ncounter
 
 Plan of Treatment
 
 
+--------+-----------+------------+----------------------+-------------------+
| Date   | Type      | Specialty  | Care Team            | Description       |
+--------+-----------+------------+----------------------+-------------------+
| / | Office    | Cardiology |   Tonia Wilson,    |                   |
|    | Visit     |            | ARNP  [address]   |                   |
|        |           |            |   [facility] [place]  |                   |
|        |           |            | [address] [phone]  |                   |
|        |           |            |  [phone] (Fax)  |                   |
+--------+-----------+------------+----------------------+-------------------+
| / | Hospital  | Radiology  |   Abebe Townsend, |                   |
|    | Encounter |            |  MD  [address] |                   |
|        |           |            |  [place]   |                   |
|        |           |            | [place] [address]             |                   |
|        |           |            | [phone]         |                   |
|        |           |            | [phone] (Fax)   |                   |
+--------+-----------+------------+----------------------+-------------------+
| / | Surgery   | Radiology  |   Abebe Townsend, | CV EP PPM SYSTEM  |
2020   |           |            |  MD  [address] | IMPLANT           |
|        |           |            |  [place],   |                   |
|        |           |            | [place] [address]             |                   |
|        |           |            | [phone]         |                   |
|        |           |            | [phone] (Fax)   |                   |
+--------+-----------+------------+----------------------+-------------------+
| 02/10/ | Clinical  | Cardiology |                      |                   |
 
|    | Support   |            |                      |                   |
+--------+-----------+------------+----------------------+-------------------+
| / | Office    | Cardiology |   Tonia Wilson,    |                   |
|    | Visit     |            | ARNJESISCA  [address]   |                   |
|        |           |            | [place]  |                   |
|        |           |            | [address]  [phone]  |                   |
|        |           |            |  [phone] (Fax)  |                   |
+--------+-----------+------------+----------------------+-------------------+
| / | Off-Site  | Nephrology |   Marzena Moser  |                   |
|    | Visit     |            | DO ETIENNE  [address]      |                   |
|        |           |            | [address]      |                   |
|        |           |            | [place]      |                   |
|        |           |            | [address]  [phone]  |                   |
|        |           |            |  [phone] (Fax)  |                   |
+--------+-----------+------------+----------------------+-------------------+
 documented as of this encounter
 
 Procedures
 
 
+----------------+--------+-------------+----------------------+----------------------+
| Procedure Name | Priori | Date/Time   | Associated Diagnosis | Comments             |
|                | ty     |             |                      |                      |
+----------------+--------+-------------+----------------------+----------------------+
| ECG 12 LEAD    | Routin | 2019  |   Persistent atrial  |   Results for this   |
|                | e      | 12:02 PM    | fibrillation  NSVT   | procedure are in the |
|                |        | PST         | (nonsustained        |  results section.    |
|                |        |             | ventricular          |                      |
|                |        |             | tachycardia) (HCC)   |                      |
|                |        |             | and ventricular      |                      |
|                |        |             | ectopy               |                      |
+----------------+--------+-------------+----------------------+----------------------+
 documented in this encounter
 
 Results
 ECG 12 lead (2019 12:02 PM PST)
 
+-------------+--------------------------+-----------+------------+--------------+
| Component   | Value                    | Ref Range | Performed  | Pathologist  |
|             |                          |           | At         | Signature    |
+-------------+--------------------------+-----------+------------+--------------+
| VENTRICULAR | 78                       | BPM       | [facility]  |              |
|  RATE EKG   |                          |           |            |              |
+-------------+--------------------------+-----------+------------+--------------+
| ATRIAL RATE | 113                      | BPM       | [facility]  |              |
+-------------+--------------------------+-----------+------------+--------------+
| QRS         | 102                      | ms        | [facility]  |              |
| DURATION    |                          |           |            |              |
+-------------+--------------------------+-----------+------------+--------------+
| Q-T         | 374                      | ms        | [facility]  |              |
| INTERVAL    |                          |           |            |              |
+-------------+--------------------------+-----------+------------+--------------+
| Q-T         | 426                      | ms        | [facility]  |              |
| INTERVAL    |                          |           |            |              |
| (CORRECTED) |                          |           |            |              |
+-------------+--------------------------+-----------+------------+--------------+
| QRS AXIS    | -26                      | degrees   | [facility]  |              |
+-------------+--------------------------+-----------+------------+--------------+
| T AXIS      | 64                       | degrees   | [facility]  |              |
+-------------+--------------------------+-----------+------------+--------------+
 
| INTERPRETAT | Atrial fibrillation with |           | [facility]  |              |
| ION TEXT    |  premature ventricular   |           |            |              |
|             | or aberrantly conducted  |           |            |              |
|             | complexesAnterior        |           |            |              |
|             | infarct (cited on or     |           |            |              |
|             | before                   |           |            |              |
|             | 10-OCT-2019)Abnormal     |           |            |              |
|             | ECGWhen compared with    |           |            |              |
|             | ECG of 10-OCT-2019       |           |            |              |
|             | 13:55,Previous ECG has   |           |            |              |
|             | undetermined rhythm,     |           |            |              |
|             | needs reviewNonspecific  |           |            |              |
|             | T wave abnormality no    |           |            |              |
|             | longer evident in        |           |            |              |
|             | Inferior leadsConfirmed  |           |            |              |
|             | by LOW ONEILL MD     |           |            |              |
|             | ([id number]) on 2019    |           |            |              |
|             | 5:55:27 PM               |           |            |              |
+-------------+--------------------------+-----------+------------+--------------+
 
 
 
+----------+
| Specimen |
+----------+
|          |
+----------+
 
 
 
+----------------------------------------------------------+--------------+
| Narrative                                                | Performed At |
+----------------------------------------------------------+--------------+
|   This result has an attachment that is not available.   |              |
+----------------------------------------------------------+--------------+
 
 
 
+--------------+---------+--------------------+--------------+
| Performing   | Address | City/State/Zipcode | Phone Number |
| Organization |         |                    |              |
+--------------+---------+--------------------+--------------+
|   [facility]  |         |                    |              |
+--------------+---------+--------------------+--------------+
 documented in this encounter
 
 Visit Diagnoses
 
 
+-----------------------------------------------------------------------------------------+
| Diagnosis                                                                               |
+-----------------------------------------------------------------------------------------+
|   Pulmonary hypertension (HCC) - Primary  Other chronic pulmonary heart diseases        |
+-----------------------------------------------------------------------------------------+
|   Coronary artery disease involving native coronary artery of native heart without      |
| angina pectoris                                                                         |
+-----------------------------------------------------------------------------------------+
|   Murmur  Undiagnosed cardiac murmurs                                                   |
+-----------------------------------------------------------------------------------------+
|   Cardiomegaly                                                                          |
 
+-----------------------------------------------------------------------------------------+
|   Hypertension, essential  Unspecified essential hypertension                           |
+-----------------------------------------------------------------------------------------+
|   Chest pain, unspecified type                                                          |
+-----------------------------------------------------------------------------------------+
|   Mixed hyperlipidemia                                                                  |
+-----------------------------------------------------------------------------------------+
|   Persistent atrial fibrillation  Atrial fibrillation                                   |
+-----------------------------------------------------------------------------------------+
|   NSVT (nonsustained ventricular tachycardia) (HCC) and ventricular ectopy  Paroxysmal  |
| ventricular tachycardia                                                                 |
+-----------------------------------------------------------------------------------------+
 documented in this encounter

## 2020-01-08 NOTE — XMS
PreManage Notification: RAS HARDY MRN:[id number]
 
Security Information
 
Security Events
No recent Security Events currently on file
 
 
 
CRITERIA MET
------------
- [facility] - 2 Visits in 30 Days
 
 
CARE PROVIDERS
-------------------------------------------------------------------------------------
Blood, Jackie C     Primary Care     Current
 
PHONE: Unknown
-------------------------------------------------------------------------------------
orrhonda     Case or Care Manager     Current
 
PHONE: Unknown
-------------------------------------------------------------------------------------
 
Arnav has no Care Guidelines for this patient.
 
JUSTEN VISIT COUNT (12 MO.)
-------------------------------------------------------------------------------------
2 [facility]
-------------------------------------------------------------------------------------
TOTAL 2
-------------------------------------------------------------------------------------
NOTE: Visits indicate total known visits.
 
ED/UCC VISIT TRACKING (12 MO.)
-------------------------------------------------------------------------------------
01/08/2020 17:50
[facility] [place]
 
TYPE: Emergency
 
COMPLAINT:
- LEG AND BACK PAIN
-------------------------------------------------------------------------------------
01/06/2020 08:53
[facility] [place]
 
TYPE: Emergency
 
COMPLAINT:
- LEG AND BACK PAIN
-------------------------------------------------------------------------------------
 
 
INPATIENT VISIT TRACKING (12 MO.)
No inpatient visits to display in this time frame
 
[email or web address]

## 2020-01-08 NOTE — XMS
Encounter Summary
  Created on: 2020
 
 Viviana Ricci
 External Reference #: [id number]
 : 47
 Sex: Female
 
 Demographics
 
 
+-----------------------+----------------------+
| Address               | [address]      |
|                       | [place]  [address] |
+-----------------------+----------------------+
| Home Phone            | [phone]      |
+-----------------------+----------------------+
| Preferred Language    | Unknown              |
+-----------------------+----------------------+
| Marital Status        | Single               |
+-----------------------+----------------------+
| Sabianism Affiliation | [id number]                 |
+-----------------------+----------------------+
| Race                  | Unknown              |
+-----------------------+----------------------+
| Ethnic Group          | Unknown              |
+-----------------------+----------------------+
 
 
 Author
 
 
+--------------+--------------------------------------------+
| Author       | [facility]  |
|              | and [facility]                                |
+--------------+--------------------------------------------+
| Organization | [facility]  |
|              | and [facility]                                |
+--------------+--------------------------------------------+
| Address      | Unknown                                    |
+--------------+--------------------------------------------+
| Phone        | Unavailable                                |
+--------------+--------------------------------------------+
 
 
 
 Support
 
 
+----------------+--------------+---------------------+-----------------+
| Name           | Relationship | Address             | Phone           |
+----------------+--------------+---------------------+-----------------+
| Ada/Ed Radhames | ECON         | [place]              | [phone] |
|                |              | [place], [place]  |                 |
|                |              |  [id number]              |                 |
+----------------+--------------+---------------------+-----------------+
 
 
 
 
 Care Team Providers
 
 
+-----------------------+------+-------------+
| Care Team Member Name | Role | Phone       |
+-----------------------+------+-------------+
 PCP  | Unavailable |
+-----------------------+------+-------------+
 
 
 
 Encounter Details
 
 
+--------+-----------+----------------------+----------------+-------------+
| Date   | Type      | Department           | Care Team      | Description |
+--------+-----------+----------------------+----------------+-------------+
| / | [facility]  |   [facility] |   [place]cret[place],  |             |
|    | Encounter |  [facility] GENER[facility] OP  | Nena GUSMAN MD  |             |
|        |           | CONV DEPT  [address]     |                |             |
|        |           | [place], |                |             |
|        |           |  [place] [address]       |                |             |
|        |           | [phone]         |                |             |
+--------+-----------+----------------------+----------------+-------------+
 
 
 
 Social History
 
 
+--------------+-------+-----------+--------+------+
| Tobacco Use  | Types | Packs/Day | Years  | Date |
|              |       |           | Used   |      |
+--------------+-------+-----------+--------+------+
| Never Smoker |       |           |        |      |
+--------------+-------+-----------+--------+------+
 
 
 
+---------------------+---+---+---+
| Smokeless Tobacco:  |   |   |   |
| Never Used          |   |   |   |
+---------------------+---+---+---+
 
 
 
+---------------------------------------------------------------+
| Comments: some second hand smoke exposure, but fairly minimal |
+---------------------------------------------------------------+
 
 
 
+-------------+-------------+---------+----------+
| Alcohol Use | Drinks/Week | oz/Week | Comments |
+-------------+-------------+---------+----------+
| No          |             |         |          |
+-------------+-------------+---------+----------+
 
 
 
 
+------------------+---------------+
| Sex Assigned at  | Date Recorded |
| Birth            |               |
+------------------+---------------+
| Not on file      |               |
+------------------+---------------+
 
 
 
+----------------+-------------+-------------+
| Job Start Date | Occupation  | Industry    |
+----------------+-------------+-------------+
| Not on file    | Not on file | Not on file |
+----------------+-------------+-------------+
 
 
 
+----------------+--------------+------------+
| Travel History | Travel Start | Travel End |
+----------------+--------------+------------+
 
 
 
+-------------------------------------+
| No recent travel history available. |
+-------------------------------------+
 documented as of this encounter
 
 Medications at Time of Discharge
 
 
+----------------------+----------------------+-----------+---------+----------+-----------+
| Medication           | Sig                  | Dispensed | Refills | Start    | End Date  |
|                      |                      |           |         | Date     |           |
+----------------------+----------------------+-----------+---------+----------+-----------+
|   glucose blood VI   | Check blood sugar    |   100     | 12      | 20 |           |
| test strips (ONE     | before each meal and | each      |         | 12       |           |
| TOUCH ULTRA TEST)    |  as directed         |           |         |          |           |
| stripIndications:    |                      |           |         |          |           |
| Unspecified          |                      |           |         |          |           |
| hypertensive kidney  |                      |           |         |          |           |
| disease with chronic |                      |           |         |          |           |
|  kidney disease      |                      |           |         |          |           |
| stage I through      |                      |           |         |          |           |
| stage IV, or         |                      |           |         |          |           |
| unspecified(403.90), |                      |           |         |          |           |
|  Complications of    |                      |           |         |          |           |
| transplanted kidney, |                      |           |         |          |           |
|  Diabetes mellitus   |                      |           |         |          |           |
| type II,             |                      |           |         |          |           |
| uncontrolled (HCC),  |                      |           |         |          |           |
| Hyperlipidemia       |                      |           |         |          |           |
+----------------------+----------------------+-----------+---------+----------+-----------+
|   Respiratory        | Decrease CPAP to     |   1 each  | 0       | 20 |           |
| Therapy Supplies     | 12-18 cmH2O          |           |         | 13       |           |
| MISC                 | Diagnosis            |           |         |          |           |
|                      | Code(s)327.23.       |           |         |          |           |
|                      | Please send order to |           |         |          |           |
|                      |  [facility].     |           |         |          |           |
 
+----------------------+----------------------+-----------+---------+----------+-----------+
|   albuterol (PROAIR  | Inhale 2 puffs into  |   1       | 2       | 20 |  |
| HFA) 90 mcg/puff     | the lungs every 6    | Inhaler   |         | 13       | 4         |
| inhalerIndications:  | hours as needed for  |           |         |          |           |
| Cough                | Wheezing.            |           |         |          |           |
+----------------------+----------------------+-----------+---------+----------+-----------+
|   allopurinol        | Take 100 mg by mouth |           | 0       | 20 |  |
| (ZYLOPRIM) 100 mg    |  Daily.              |           |         | 12       | 3         |
| tablet               |                      |           |         |          |           |
+----------------------+----------------------+-----------+---------+----------+-----------+
|   aspirin 81 MG EC   | Take 81 mg by mouth  |           | 0       | 20 |  |
| tablet               | Daily.               |           |         | 12       | 5         |
+----------------------+----------------------+-----------+---------+----------+-----------+
|   Cholecalciferol    | Take 5,000 Units by  |           | 0       | 20 |  |
| (VITAMIN D3) 5000    | mouth Once a week.   |           |         | 12       | 4         |
| UNITS CAPS           |                      |           |         |          |           |
+----------------------+----------------------+-----------+---------+----------+-----------+
|   cinacalcet         | Take 1 tablet by     |   30      | 12      | 10/29/20 |  |
| (SENSIPAR) 30 mg     | mouth Daily.         | tablet    |         | 12       | 3         |
| tablet               |                      |           |         |          |           |
+----------------------+----------------------+-----------+---------+----------+-----------+
|   fludrocortisone    | Take 1 tablet by     |   30      | 11      | 10/01/20 |  |
| (FLORINEF) 0.1 mg    | mouth Every other    | tablet    |         | 12       | 4         |
| tablet               | day.                 |           |         |          |           |
+----------------------+----------------------+-----------+---------+----------+-----------+
|   flunisolide        | 2 sprays by Nasal    |   25 mL   | 12      | 20 |  |
| (NASAREL) 29 MCG/ACT | route Daily. Dose is |           |         | 13       | 4         |
|  (0.025%) nasal      |  for each nostril.   |           |         |          |           |
| spray                |                      |           |         |          |           |
+----------------------+----------------------+-----------+---------+----------+-----------+
|   fluticasone        | Inhale 1 puff into   |   1       | 11      | 20 |  |
| (FLOVENT HFA) 220    | the lungs 2 times    | Inhaler   |         | 13       | 5         |
| mcg/puff inhaler     | daily. Rinse mouth   |           |         |          |           |
|                      | after use.           |           |         |          |           |
+----------------------+----------------------+-----------+---------+----------+-----------+
|   furosemide (LASIX) | Take two tablets by  |   60      | 5       | 20 | 07/15/201 |
|  40 mg tablet        | mouth daily.         | tablet    |         | 12       | 3         |
+----------------------+----------------------+-----------+---------+----------+-----------+
|   insulin glargine   | Inject 20 Units      |           | 0       | 20 |  |
| (LANTUS) 100         | under the skin every |           |         | 12       | 3         |
| units/mL             |  morning.            |           |         |          |           |
| injectionIndications |                      |           |         |          |           |
| : Unspecified        |                      |           |         |          |           |
| hypertensive kidney  |                      |           |         |          |           |
| disease with chronic |                      |           |         |          |           |
|  kidney disease      |                      |           |         |          |           |
| stage I through      |                      |           |         |          |           |
| stage IV, or         |                      |           |         |          |           |
| unspecified(403.90), |                      |           |         |          |           |
|  Complications of    |                      |           |         |          |           |
| transplanted kidney, |                      |           |         |          |           |
|  Diabetes mellitus   |                      |           |         |          |           |
| type II,             |                      |           |         |          |           |
| uncontrolled ([facility]),  |                      |           |         |          |           |
| Hyperlipidemia       |                      |           |         |          |           |
+----------------------+----------------------+-----------+---------+----------+-----------+
|   insulin lispro     | Inject               |   10 pen  | 5       | 20 |  |
| (HUMALOG) 100        | subcutaneously       |           |         | 13       | 4         |
| units/mL injection   | before meals         |           |         |          |           |
|                      | according to sliding |           |         |          |           |
 
|                      |  scale               |           |         |          |           |
+----------------------+----------------------+-----------+---------+----------+-----------+
|   Insulin            | Use before meals and |   100     | 11      | 20 |  |
| Syringe-Needle U-100 |  as directed.        | each      |         | 13       | 4         |
|  (BD INSULIN SYRINGE |                      |           |         |          |           |
|  ULTRAFINE) 31G X    |                      |           |         |          |           |
| " 0.5 ML MISC    |                      |           |         |          |           |
+----------------------+----------------------+-----------+---------+----------+-----------+
|   levothyroxine      | Take 1 tablet by     |   30      | 11      | 10/01/20 |  |
| (LEVOTHROID) 50 mcg  | mouth Daily.         | tablet    |         | 12       | 3         |
| tablet               |                      |           |         |          |           |
+----------------------+----------------------+-----------+---------+----------+-----------+
|   lisinopril         | Take 1 tablet by     |   90      | 3       | 20 |  |
| (PRINIVIL,ZESTRIL)   | mouth Daily.         | tablet    |         | 12       | 3         |
| 30 MG tablet         |                      |           |         |          |           |
+----------------------+----------------------+-----------+---------+----------+-----------+
|   loperamide         | Take 2 mg by mouth   |           | 0       | 20 |  |
| (ANTI-DIARRHEAL) 2   | Daily as needed.     |           |         | 12       | 4         |
| mg capsule           |                      |           |         |          |           |
+----------------------+----------------------+-----------+---------+----------+-----------+
|   magnesium oxide    | Take 1 tablet by     |   62      | 12      | 20 |  |
| (MAG-OX) 400 mg      | mouth 2 times daily. | tablet    |         | 13       | 4         |
| tablet               |                      |           |         |          |           |
+----------------------+----------------------+-----------+---------+----------+-----------+
|   metoprolol         | Take 1 tablet by     |   60      | 11      | 20 |  |
| tartrate (LOPRESSOR) | mouth 2 times daily. | tablet    |         | 13       | 4         |
|  25 mg tablet        |                      |           |         |          |           |
+----------------------+----------------------+-----------+---------+----------+-----------+
|   mycophenolate      | Take 250 mg by mouth |           | 0       | 20 | 10/14/201 |
| (CELLCEPT) 250 mg    |  3 times daily.      |           |         | 11       | 5         |
| capsule              |                      |           |         |          |           |
+----------------------+----------------------+-----------+---------+----------+-----------+
|   omeprazole         | Take one capsule by  |           | 0       | 20 |  |
| (PRILOSEC) 20 mg     | mouth once daily on  |           |         | 12       | 3         |
| capsule              | an empty stomach     |           |         |          |           |
+----------------------+----------------------+-----------+---------+----------+-----------+
|   predniSONE         | Take  by mouth See   |           | 0       |          | 07/15/201 |
| (DELTASONE) 10 mg    | Admin Instructions.  |           |         |          | 3         |
| tablet               | Take 40 mg by mouth  |           |         |          |           |
|                      | daily for five days, |           |         |          |           |
|                      |  then decrease to 30 |           |         |          |           |
|                      |  mg for two days,    |           |         |          |           |
|                      | then decrease to 20  |           |         |          |           |
|                      | mg for two days,     |           |         |          |           |
|                      | then decrease to 10  |           |         |          |           |
|                      | mg for two days,     |           |         |          |           |
|                      | then discontinue and |           |         |          |           |
|                      |  resume daily dose   |           |         |          |           |
|                      | of 5 mg daily.       |           |         |          |           |
+----------------------+----------------------+-----------+---------+----------+-----------+
|   predniSONE         | Take 1 tablet by     |   90      | 3       | 20 |  |
| (DELTASONE) 5 mg     | mouth Daily.         | tablet    |         | 12       | 4         |
| tablet               |                      |           |         |          |           |
+----------------------+----------------------+-----------+---------+----------+-----------+
|   Prenatal           | Take 0.8 mg by mouth |   30 each | 11      | 20 |  |
| Multivit-Min-Fe-FA   |  Daily.              |           |         | 13       | 3         |
| (PRENATAL VITAMINS)  |                      |           |         |          |           |
| 0.8 MG TABS          |                      |           |         |          |           |
+----------------------+----------------------+-----------+---------+----------+-----------+
|   rosuvastatin       | Take 20 mg by mouth  |           | 0       |          |  |
 
| (CRESTOR) 40 MG      | nightly.             |           |         |          | 3         |
| tabletIndications:   |                      |           |         |          |           |
| Unspecified          |                      |           |         |          |           |
| hypertensive kidney  |                      |           |         |          |           |
| disease with chronic |                      |           |         |          |           |
|  kidney disease      |                      |           |         |          |           |
| stage I through      |                      |           |         |          |           |
| stage IV, or         |                      |           |         |          |           |
| unspecified(403.90), |                      |           |         |          |           |
|  Complications of    |                      |           |         |          |           |
| transplanted kidney, |                      |           |         |          |           |
|  Diabetes mellitus   |                      |           |         |          |           |
| type II,             |                      |           |         |          |           |
| uncontrolled (HCC),  |                      |           |         |          |           |
| Hyperlipidemia       |                      |           |         |          |           |
+----------------------+----------------------+-----------+---------+----------+-----------+
|   tacrolimus         | TAD.                 |           | 0       | 20 | 07/15/201 |
| (PROGRAF) 0.5 mg     |                      |           |         | 12       | 4         |
| capsuleIndications:  |                      |           |         |          |           |
| Unspecified          |                      |           |         |          |           |
| hypertensive kidney  |                      |           |         |          |           |
| disease with chronic |                      |           |         |          |           |
|  kidney disease      |                      |           |         |          |           |
| stage I through      |                      |           |         |          |           |
| stage IV, or         |                      |           |         |          |           |
| unspecified(403.90), |                      |           |         |          |           |
|  Complications of    |                      |           |         |          |           |
| transplanted kidney, |                      |           |         |          |           |
|  Diabetes mellitus   |                      |           |         |          |           |
| type II,             |                      |           |         |          |           |
| uncontrolled (HCC),  |                      |           |         |          |           |
| Hyperlipidemia       |                      |           |         |          |           |
+----------------------+----------------------+-----------+---------+----------+-----------+
|   tacrolimus         | Take 1.5 mg by mouth |           | 0       | 20 |  |
| (PROGRAF) 1 mg       |  2 times daily.      |           |         | 13       | 4         |
| capsuleIndications:  |                      |           |         |          |           |
| Unspecified          |                      |           |         |          |           |
| hypertensive kidney  |                      |           |         |          |           |
| disease with chronic |                      |           |         |          |           |
|  kidney disease      |                      |           |         |          |           |
| stage I through      |                      |           |         |          |           |
| stage IV, or         |                      |           |         |          |           |
| unspecified(403.90), |                      |           |         |          |           |
|  FSGS (focal         |                      |           |         |          |           |
| segmental            |                      |           |         |          |           |
| glomerulosclerosis), |                      |           |         |          |           |
|  Hyperlipidemia,     |                      |           |         |          |           |
| Complications of     |                      |           |         |          |           |
| transplanted kidney  |                      |           |         |          |           |
+----------------------+----------------------+-----------+---------+----------+-----------+
|   tacrolimus         | Take 1 capsule by    |   62      | 12      | 20 |  |
| (PROGRAF) 1 mg       | mouth 2 times daily. | capsule   |         | 13       | 4         |
| capsuleIndications:  |                      |           |         |          |           |
| Unspecified          |                      |           |         |          |           |
| hypertensive kidney  |                      |           |         |          |           |
| disease with chronic |                      |           |         |          |           |
|  kidney disease      |                      |           |         |          |           |
| stage I through      |                      |           |         |          |           |
| stage IV, or         |                      |           |         |          |           |
| unspecified(403.90), |                      |           |         |          |           |
 
|  Complications of    |                      |           |         |          |           |
| transplanted kidney, |                      |           |         |          |           |
|  Diabetes mellitus   |                      |           |         |          |           |
| type II,             |                      |           |         |          |           |
| uncontrolled (HCC),  |                      |           |         |          |           |
| Hyperlipidemia       |                      |           |         |          |           |
+----------------------+----------------------+-----------+---------+----------+-----------+
|   valsartan (DIOVAN) | Take 1 tablet by     |   30      | 11      | 20 |  |
|  160 mg tablet       | mouth Daily.         | tablet    |         | 13       | 4         |
+----------------------+----------------------+-----------+---------+----------+-----------+
 documented as of this encounter
 
 Plan of Treatment
 
 
+--------+-----------+------------+----------------------+-------------------+
| Date   | Type      | Specialty  | Care Team            | Description       |
+--------+-----------+------------+----------------------+-------------------+
| / | Office    | Cardiology |   Tonia Wilson,    |                   |
|    | Visit     |            | [address]   |                   |
|        |           |            | St  [place], [place]  |                   |
|        |           |            | [address]  [phone]  |                   |
|        |           |            |  [phone] (Fax)  |                   |
+--------+-----------+------------+----------------------+-------------------+
| / | Hospital  | Radiology  |   Popeye Townsend, |                   |
|    | Encounter |            |  MD  [address] |                   |
|        |           |            |  St.  [place],   |                   |
|        |           |            | [place] [address]             |                   |
|        |           |            | [phone]         |                   |
|        |           |            | [phone] (Fax)   |                   |
+--------+-----------+------------+----------------------+-------------------+
| / | Surgery   | Radiology  |   Popeye Townsend, | CV EP PPM SYSTEM  |
|    |           |            |  MD  [address] | IMPLANT           |
|        |           |            |  St.  [place],   |                   |
|        |           |            | [place] [address]             |                   |
|        |           |            | [phone]         |                   |
|        |           |            | [phone] (Fax)   |                   |
+--------+-----------+------------+----------------------+-------------------+
| 02/10/ | Clinical  | Cardiology |                      |                   |
|    | Support   |            |                      |                   |
+--------+-----------+------------+----------------------+-------------------+
| / | Office    | Cardiology |   Tonia Wilson,    |                   |
|    | Visit     |            | ARNP  [address]   |                   |
|        |           |            | St  [place], [place]  |                   |
|        |           |            | [address]  [phone]  |                   |
|        |           |            |  [phone] (Fax)  |                   |
+--------+-----------+------------+----------------------+-------------------+
| / | Off-Site  | Nephrology |   Marzena Moser  |                   |
|    | Visit     |            | DO ETIENNE  [address] [place] [address]      |                   |
|        |           |            | [address]      |                   |
|        |           |            | [place]      |                   |
|        |           |            | [address]  [phone]  |                   |
|        |           |            |  [phone] (Fax)  |                   |
+--------+-----------+------------+----------------------+-------------------+
 documented as of this encounter
 
 Visit Diagnoses
 Not on filedocumented in this encounter

## 2020-01-08 NOTE — XMS
Encounter Summary
  Created on: 2020
 
 Viviana Ricci
 External Reference #: [id number]
 : 47
 Sex: Female
 
 Demographics
 
 
+-----------------------+----------------------+
| Address               | [address]      |
|                       | [place]  [address] |
+-----------------------+----------------------+
| Home Phone            | [phone]      |
+-----------------------+----------------------+
| Preferred Language    | Unknown              |
+-----------------------+----------------------+
| Marital Status        | Single               |
+-----------------------+----------------------+
| [employer]iliation | [id number]                 |
+-----------------------+----------------------+
| Race                  | Unknown              |
+-----------------------+----------------------+
| Ethnic Group          | Unknown              |
+-----------------------+----------------------+
 
 
 Author
 
 
+--------------+--------------------------------------------+
| Author       | [facility]  |
|              | and [facility]                                |
+--------------+--------------------------------------------+
| Organization | [facility]  |
|              | and [facility]                                |
+--------------+--------------------------------------------+
| Address      | Unknown                                    |
+--------------+--------------------------------------------+
| Phone        | Unavailable                                |
+--------------+--------------------------------------------+
 
 
 
 Support
 
 
+----------------+--------------+---------------------+-----------------+
| Name           | Relationship | Address             | Phone           |
+----------------+--------------+---------------------+-----------------+
| Ada/Ed Radhames | ECON         | [place]              | [phone] |
|                |              | [place]  |                 |
|                |              |  [id number]              |                 |
+----------------+--------------+---------------------+-----------------+
 
 
 
 
 Care Team Providers
 
 
+-----------------------+------+-------------+
| Care Team Member Name | Role | Phone       |
+-----------------------+------+-------------+
 PCP  | Unavailable |
+-----------------------+------+-------------+
 
 
 
 Encounter Details
 
 
+--------+----------+---------------------+----------------------+-------------+
| Date   | Type     | Department          | Care Team            | Description |
+--------+----------+---------------------+----------------------+-------------+
| / | Abstract |   [facility] [place]         |   Marzena Moser  |             |
|    |          | NEPHROLOGY  [address]   | M, DO  [address]      |             |
|        |          | [address]   | [address]      |             |
|        |          | [place], [place]     | [place]      |             |
|        |          | [address]          | [address]  [phone]  |             |
|        |          | [phone]        |  [phone] (Fax)  |             |
+--------+----------+---------------------+----------------------+-------------+
 
 
 
 Social History
 
 
+--------------+-------+-----------+--------+------+
| Tobacco Use  | Types | Packs/Day | Years  | Date |
|              |       |           | Used   |      |
+--------------+-------+-----------+--------+------+
| Never Smoker |       |           |        |      |
+--------------+-------+-----------+--------+------+
 
 
 
+---------------------+---+---+---+
| Smokeless Tobacco:  |   |   |   |
| Never Used          |   |   |   |
+---------------------+---+---+---+
 
 
 
+---------------------------------------------------------------+
| Comments: some second hand smoke exposure, but fairly minimal |
+---------------------------------------------------------------+
 
 
 
+-------------+-------------+---------+----------+
| Alcohol Use | Drinks/Week | oz/Week | Comments |
+-------------+-------------+---------+----------+
| No          |             |         |          |
+-------------+-------------+---------+----------+
 
 
 
 
+------------------+---------------+
| Sex Assigned at  | Date Recorded |
| Birth            |               |
+------------------+---------------+
| Not on file      |               |
+------------------+---------------+
 
 
 
+----------------+-------------+-------------+
| Job Start Date | Occupation  | Industry    |
+----------------+-------------+-------------+
| Not on file    | Not on file | Not on file |
+----------------+-------------+-------------+
 
 
 
+----------------+--------------+------------+
| Travel History | Travel Start | Travel End |
+----------------+--------------+------------+
 
 
 
+-------------------------------------+
| No recent travel history available. |
+-------------------------------------+
 documented as of this encounter
 
 Plan of Treatment
 
 
+--------+-----------+------------+----------------------+-------------------+
| Date   | Type      | Specialty  | Care Team            | Description       |
+--------+-----------+------------+----------------------+-------------------+
| / | Office    | Cardiology |   Tonia Wilson,    |                   |
|    | Visit     |            | ARNJESSICA  [address]   |                   |
|        |           |            | [place]  |                   |
|        |           |            | [address]  [phone]  |                   |
|        |           |            |  [phone] (Fax)  |                   |
+--------+-----------+------------+----------------------+-------------------+
| / | Hospital  | Radiology  |   Popeye Townsend, |                   |
|    | Encounter |            |  MD  [address] |                   |
|        |           |            |  St.  [place],   |                   |
|        |           |            | [place] [address]             |                   |
|        |           |            | [phone]         |                   |
|        |           |            | [phone] (Fax)   |                   |
+--------+-----------+------------+----------------------+-------------------+
| / | Surgery   | Radiology  |   Popeye Townsend, | CV EP PPM SYSTEM  |
|    |           |            |  MD  [address] | IMPLANT           |
|        |           |            |  St.  [place],   |                   |
|        |           |            | [place] [address]             |                   |
|        |           |            | [phone]         |                   |
|        |           |            | [phone] (Fax)   |                   |
+--------+-----------+------------+----------------------+-------------------+
| 02/10/ | Clinical  | Cardiology |                      |                   |
|    | Support   |            |                      |                   |
+--------+-----------+------------+----------------------+-------------------+
| / | Office    | Cardiology |   Tonia Wilson,    |                   |
|    | Visit     |            | ARNP  [address]   |                   |
 
|        |           |            | St  [place], [place]  |                   |
|        |           |            | [address]  [phone]  |                   |
|        |           |            |  [phone] (Fax)  |                   |
+--------+-----------+------------+----------------------+-------------------+
| / | Off-Site  | Nephrology |   Marzena Moser  |                   |
|    | Visit     |            | DO ETIENNE  [address] [place] [address]      |                   |
|        |           |            | [address]      |                   |
|        |           |            | [place]      |                   |
|        |           |            | [address]  [phone]  |                   |
|        |           |            |  [phone] (Fax)  |                   |
+--------+-----------+------------+----------------------+-------------------+
 documented as of this encounter
 
 Procedures
 
 
+------------------+--------+------------+----------------------+----------------------+
| Procedure Name   | Priori | Date/Time  | Associated Diagnosis | Comments             |
|                  | ty     |            |                      |                      |
+------------------+--------+------------+----------------------+----------------------+
| EXTERNAL LAB:    | Routin | 2018 |                      |   Results for this   |
| URINALYSIS       | e      |            |                      | procedure are in the |
|                  |        |            |                      |  results section.    |
+------------------+--------+------------+----------------------+----------------------+
| URINALYSIS WITH  | Routin | 2018 |                      |   Results for this   |
| MICROSCOPIC      | e      |            |                      | procedure are in the |
|                  |        |            |                      |  results section.    |
+------------------+--------+------------+----------------------+----------------------+
 documented in this encounter
 
 Results
 External Lab: Urinalysis (2018)
 
+-------------+----------+-----------+------------+--------------+
| Component   | Value    | Ref Range | Performed  | Pathologist  |
|             |          |           | At         | Signature    |
+-------------+----------+-----------+------------+--------------+
| UA Blood,   | negative |           |            |              |
| External    |          |           |            |              |
+-------------+----------+-----------+------------+--------------+
| UA Glucose, | normal   |           |            |              |
|  External   |          |           |            |              |
+-------------+----------+-----------+------------+--------------+
| UA Ketones, | negative |           |            |              |
|  External   |          |           |            |              |
+-------------+----------+-----------+------------+--------------+
| UA Ph,      | 5        |           |            |              |
| External    |          |           |            |              |
+-------------+----------+-----------+------------+--------------+
| UA          | 30       |           |            |              |
| Proteins,   |          |           |            |              |
| External    |          |           |            |              |
+-------------+----------+-----------+------------+--------------+
| UA RBC,     | 2        |           |            |              |
| External    |          |           |            |              |
+-------------+----------+-----------+------------+--------------+
| UA Specific | 1.013    |           |            |              |
|  Gravity,   |          |           |            |              |
| External    |          |           |            |              |
+-------------+----------+-----------+------------+--------------+
 
| UA          | large    |           |            |              |
| Leukocyte   |          |           |            |              |
| Esterase,   |          |           |            |              |
| External    |          |           |            |              |
+-------------+----------+-----------+------------+--------------+
 Urinalysis With Microscopic (2018)
 
+-------------+----------+----------------+------------+--------------+
| Component   | Value    | Ref Range      | Performed  | Pathologist  |
|             |          |                | At         | Signature    |
+-------------+----------+----------------+------------+--------------+
| WBC UA      | 50       | /HPF           |            |              |
+-------------+----------+----------------+------------+--------------+
| Clarity     | Cloudy   |                |            |              |
+-------------+----------+----------------+------------+--------------+
| Color,      | Yellow   | Light Yellow,  |            |              |
| Urine       |          | Yellow         |            |              |
+-------------+----------+----------------+------------+--------------+
| BACTERIA UA | 1+ (A)   | Negative /HPF  |            |              |
+-------------+----------+----------------+------------+--------------+
| SQUAMOUS    | 0-2      | 0 - 2 /LPF     |            |              |
| EPITHELIAL  |          |                |            |              |
| UA          |          |                |            |              |
+-------------+----------+----------------+------------+--------------+
| Nitrite,    | Negative | Negative       |            |              |
| Urine       |          |                |            |              |
+-------------+----------+----------------+------------+--------------+
 
 
 
+----------+
| Specimen |
+----------+
| Urine    |
+----------+
 documented in this encounter
 
 Visit Diagnoses
 Not on filedocumented in this encounter

## 2020-01-08 NOTE — XMS
Encounter Summary
  Created on: 2020
 
 Viviana Ricci
 External Reference #: [id number]
 : 47
 Sex: Female
 
 Demographics
 
 
+-----------------------+----------------------+
| Address               | [address]      |
|                       | [place]  [address] |
+-----------------------+----------------------+
| Home Phone            | [phone]      |
+-----------------------+----------------------+
| Preferred Language    | Unknown              |
+-----------------------+----------------------+
| Marital Status        | Single               |
+-----------------------+----------------------+
| Shinto Affiliation | [id number]                 |
+-----------------------+----------------------+
| Race                  | Unknown              |
+-----------------------+----------------------+
| Ethnic Group          | Unknown              |
+-----------------------+----------------------+
 
 
 Author
 
 
+--------------+--------------------------------------------+
| Author       | [facility]  |
|              | and [facility]                                |
+--------------+--------------------------------------------+
| Organization | [facility]  |
|              | and [facility]                                |
+--------------+--------------------------------------------+
| Address      | Unknown                                    |
+--------------+--------------------------------------------+
| Phone        | Unavailable                                |
+--------------+--------------------------------------------+
 
 
 
 Support
 
 
+----------------+--------------+---------------------+-----------------+
| Name           | Relationship | Address             | Phone           |
+----------------+--------------+---------------------+-----------------+
| Ada/Ed Radhames | ECON         | [place]              | [phone] |
|                |              | [place]  |                 |
|                |              |  [address]              |                 |
+----------------+--------------+---------------------+-----------------+
 
 
 
 
 Care Team Providers
 
 
+-----------------------+------+-------------+
| Care Team Member Name | Role | Phone       |
+-----------------------+------+-------------+
 PCP  | Unavailable |
+-----------------------+------+-------------+
 
 
 
 Reason for Visit
 
 
+-------------------+----------+
| Reason            | Comments |
+-------------------+----------+
| Medication Refill |          |
+-------------------+----------+
 
 
 
 Encounter Details
 
 
+--------+--------+---------------------+----------------------+-------------------+
| Date   | Type   | Department          | Care Team            | Description       |
+--------+--------+---------------------+----------------------+-------------------+
| / | Refill |   [place]         |   Marzena Moser  | Medication Refill |
|    |        | NEPHROLOGY  [address]   | M, DO  [address]      |                   |
|        |        | [address]   | [address]      |                   |
|        |        | [place], [place]     | [place], [place]      |                   |
|        |        | [address]          | [address]  [phone]  |                   |
|        |        | [phone]        |  [phone] (Fax)  |                   |
+--------+--------+---------------------+----------------------+-------------------+
 
 
 
 Social History
 
 
+--------------+-------+-----------+--------+------+
| Tobacco Use  | Types | Packs/Day | Years  | Date |
|              |       |           | Used   |      |
+--------------+-------+-----------+--------+------+
| Never Smoker |       |           |        |      |
+--------------+-------+-----------+--------+------+
 
 
 
+---------------------+---+---+---+
| Smokeless Tobacco:  |   |   |   |
| Never Used          |   |   |   |
+---------------------+---+---+---+
 
 
 
+-------------+-------------+---------+----------+
| Alcohol Use | Drinks/Week | oz/Week | Comments |
 
+-------------+-------------+---------+----------+
| No          |             |         |          |
+-------------+-------------+---------+----------+
 
 
 
+------------------+---------------+
| Sex Assigned at  | Date Recorded |
| Birth            |               |
+------------------+---------------+
| Not on file      |               |
+------------------+---------------+
 
 
 
+----------------+-------------+-------------+
| Job Start Date | Occupation  | Industry    |
+----------------+-------------+-------------+
| Not on file    | Not on file | Not on file |
+----------------+-------------+-------------+
 
 
 
+----------------+--------------+------------+
| Travel History | Travel Start | Travel End |
+----------------+--------------+------------+
 
 
 
+-------------------------------------+
| No recent travel history available. |
+-------------------------------------+
 documented as of this encounter
 
 Plan of Treatment
 
 
+--------+-----------+------------+----------------------+-------------------+
| Date   | Type      | Specialty  | Care Team            | Description       |
+--------+-----------+------------+----------------------+-------------------+
| / | Office    | Cardiology |   Tonia Wilson,    |                   |
|    | Visit     |            | BELKIS  [address]   |                   |
|        |           |            | St  [place], [place]  |                   |
|        |           |            | [address] [phone]  |                   |
|        |           |            |  [phone] (Fax)  |                   |
+--------+-----------+------------+----------------------+-------------------+
| / | Hospital  | Radiology  |   Cary Himanshuraul, |                   |
|    | Encounter |            |  MD  [address] |                   |
|        |           |            |  St.  [place],   |                   |
|        |           |            | [place] [address]             |                   |
|        |           |            | [phone]         |                   |
|        |           |            | [phone] (Fax)   |                   |
+--------+-----------+------------+----------------------+-------------------+
| / | Surgery   | Radiology  |   Aimeechidi Yinpeeraul, | CV EP PPM SYSTEM  |
|    |           |            |  MD  [address] | IMPLANT           |
|        |           |            |  St.  [place],   |                   |
|        |           |            | [place] [address]             |                   |
|        |           |            | [phone]         |                   |
|        |           |            | [phone] (Fax)   |                   |
+--------+-----------+------------+----------------------+-------------------+
 
| 02/10/ | Clinical  | Cardiology |                      |                   |
|    | Support   |            |                      |                   |
+--------+-----------+------------+----------------------+-------------------+
| / | Office    | Cardiology |   Tonia Wilson,    |                   |
|    | Visit     |            | ARNP  [address]   |                   |
|        |           |            | St  [place], [place]  |                   |
|        |           |            | [address]  [phone]  |                   |
|        |           |            |  [phone] (Fax)  |                   |
+--------+-----------+------------+----------------------+-------------------+
| / | Off-Site  | Nephrology |   Marzena Moser  |                   |
|    | Visit     |            | DO ETIENNE  [address] [place] [address]      |                   |
|        |           |            | [address]      |                   |
|        |           |            | [place]      |                   |
|        |           |            | [address] [phone]  |                   |
|        |           |            |  [phone] (Fax)  |                   |
+--------+-----------+------------+----------------------+-------------------+
 documented as of this encounter
 
 Visit Diagnoses
 Not on filedocumented in this encounter

## 2020-01-08 NOTE — XMS
Encounter Summary
  Created on: 2020
 
 Viviana Ricci
 External Reference #: [id number]
 : 47
 Sex: Female
 
 Demographics
 
 
+-----------------------+----------------------+
| Address               | [address]      |
|                       | [place]  [address] |
+-----------------------+----------------------+
| Home Phone            | [phone]      |
+-----------------------+----------------------+
| Preferred Language    | Unknown              |
+-----------------------+----------------------+
| Marital Status        | Single               |
+-----------------------+----------------------+
| [employer] Affiliation | 1009                 |
+-----------------------+----------------------+
| Race                  | Unknown              |
+-----------------------+----------------------+
| Ethnic Group          | Unknown              |
+-----------------------+----------------------+
 
 
 Author
 
 
+--------------+--------------------------------------------+
| Author       | [facility]  |
|              | and [place]                                |
+--------------+--------------------------------------------+
| Organization | [facility]  |
|              | and [place]                                |
+--------------+--------------------------------------------+
| Address      | Unknown                                    |
+--------------+--------------------------------------------+
| Phone        | Unavailable                                |
+--------------+--------------------------------------------+
 
 
 
 Support
 
 
+----------------+--------------+---------------------+-----------------+
| Name           | Relationship | Address             | Phone           |
+----------------+--------------+---------------------+-----------------+
| Ada/Ed Radhames | ECON         | [address]              | [phone] |
|                |              | [place], [place]  |                 |
|                |              |  [address]              |                 |
+----------------+--------------+---------------------+-----------------+
 
 
 
 
 Care Team Providers
 
 
+-----------------------+------+-------------+
| Care Team Member Name | Role | Phone       |
+-----------------------+------+-------------+
 PCP  | Unavailable |
+-----------------------+------+-------------+
 
 
 
 Encounter Details
 
 
+--------+-------------+---------------------+----------------------+-------------+
| Date   | Type        | Department          | Care Team            | Description |
+--------+-------------+---------------------+----------------------+-------------+
| 10/21/ | Orders Only |   [facility] [place]         |   Bobbi Hanson,  |             |
|    |             | NEPHROLOGY  [address]   | RN                   |             |
|        |             | [address]   |                      |             |
|        |             | [place], [place]     |                      |             |
|        |             | [address]          |                      |             |
|        |             | [phone]        |                      |             |
+--------+-------------+---------------------+----------------------+-------------+
 
 
 
 Social History
 
 
+--------------+-------+-----------+--------+------+
| Tobacco Use  | Types | Packs/Day | Years  | Date |
|              |       |           | Used   |      |
+--------------+-------+-----------+--------+------+
| Never Smoker |       |           |        |      |
+--------------+-------+-----------+--------+------+
 
 
 
+---------------------+---+---+---+
| Smokeless Tobacco:  |   |   |   |
| Never Used          |   |   |   |
+---------------------+---+---+---+
 
 
 
+---------------------------------------------------------------+
| Comments: some second hand smoke exposure, but fairly minimal |
+---------------------------------------------------------------+
 
 
 
+-------------+-------------+---------+----------+
| Alcohol Use | Drinks/Week | oz/Week | Comments |
+-------------+-------------+---------+----------+
| No          |             |         |          |
+-------------+-------------+---------+----------+
 
 
 
 
+------------------+---------------+
| Sex Assigned at  | Date Recorded |
| Birth            |               |
+------------------+---------------+
| Not on file      |               |
+------------------+---------------+
 
 
 
+----------------+-------------+-------------+
| Job Start Date | Occupation  | Industry    |
+----------------+-------------+-------------+
| Not on file    | Not on file | Not on file |
+----------------+-------------+-------------+
 
 
 
+----------------+--------------+------------+
| Travel History | Travel Start | Travel End |
+----------------+--------------+------------+
 
 
 
+-------------------------------------+
| No recent travel history available. |
+-------------------------------------+
 documented as of this encounter
 
 Progress Notes
 Bobbi Hanson RN - 10/21/2015  2:35 PM PDTPatient denies fever or chills. Per Dr. Geeta mak, no antibiotics at this time. Pt to Call office if fever/chills/NV or dysuria - pt laureen torres expressed a clear understanding. Electronically signed by Bobbi Hanson RN at 10/21/2
015  2:37 PM PDTdocumented in this encounter
 
 Plan of Treatment
 
 
+--------+-----------+------------+----------------------+-------------------+
| Date   | Type      | Specialty  | Care Team            | Description       |
+--------+-----------+------------+----------------------+-------------------+
| / | Office    | Cardiology |   Tonia Wilson,    |                   |
|    | Visit     |            | BELKIS  [address]   |                   |
|        |           |            [facility]   [facility] [place]  |                   |
|        |           |            | [address]  [phone]  |                   |
|        |           |            |  [phone] (Fax)  |                   |
+--------+-----------+------------+----------------------+-------------------+
| / | Hospital  | Radiology  |   Popeye Townsend, |                   |
|    | Encounter |            |  MD  [address] |                   |
|        |           |            |  St.  [place],   |                   |
|        |           |            | [place] [address]             |                   |
|        |           |            | [phone]         |                   |
|        |           |            | [phone] (Fax)   |                   |
+--------+-----------+------------+----------------------+-------------------+
| / | Surgery   | Radiology  |   Popeye Townsend, | CV EP PPM SYSTEM  |
|    |           |            |  MD  [address] | IMPLANT           |
|        |           |            |  St.  [place],   |                   |
|        |           |            | [place] [address]             |                   |
|        |           |            | [phone]         |                   |
|        |           |            | [phone] (Fax)   |                   |
 
+--------+-----------+------------+----------------------+-------------------+
| 02/10/ | Clinical  | Cardiology |                      |                   |
|    | Support   |            |                      |                   |
+--------+-----------+------------+----------------------+-------------------+
| / | Office    | Cardiology |   Tonia Wilson,    |                   |
|    | Visit     |            | BELKIS  [address]   |                   |
|        |           |            | [place]  |                   |
|        |           |            | [address]  [phone]  |                   |
|        |           |            |  [phone] (Fax)  |                   |
+--------+-----------+------------+----------------------+-------------------+
| / | Off-Site  | Nephrology |   Marzena Moser  |                   |
|    | Visit     |            | DO ETIENNE  [address] [place] [address]      |                   |
|        |           |            | [address]      |                   |
|        |           |            | [place]      |                   |
|        |           |            | [address]  [phone]  |                   |
|        |           |            |  [phone] (Fax)  |                   |
+--------+-----------+------------+----------------------+-------------------+
 documented as of this encounter
 
 Visit Diagnoses
 Not on filedocumented in this encounter

## 2020-01-08 NOTE — XMS
Encounter Summary
  Created on: 2020
 
 Viviana Ricci
 External Reference #: [id number]
 : 47
 Sex: Female
 
 Demographics
 
 
+-----------------------+----------------------+
| Address               | [address]      |
|                       | [place]  [address] |
+-----------------------+----------------------+
| Home Phone            | [phone]      |
+-----------------------+----------------------+
| Preferred Language    | Unknown              |
+-----------------------+----------------------+
| Marital Status        | Single               |
+-----------------------+----------------------+
| Scientology Affiliation | [id number]                 |
+-----------------------+----------------------+
| Race                  | Unknown              |
+-----------------------+----------------------+
| Ethnic Group          | Unknown              |
+-----------------------+----------------------+
 
 
 Author
 
 
+--------------+--------------------------------------------+
| Author       | [facility]  |
|              | and [place]                                |
+--------------+--------------------------------------------+
| Organization | [facility]  |
|              | and [place]                                |
+--------------+--------------------------------------------+
| Address      | Unknown                                    |
+--------------+--------------------------------------------+
| Phone        | Unavailable                                |
+--------------+--------------------------------------------+
 
 
 
 Support
 
 
+----------------+--------------+---------------------+-----------------+
| Name           | Relationship | Address             | Phone           |
+----------------+--------------+---------------------+-----------------+
| Ada/Ed Radhames | ECON         | [address]              | [phone] |
|                |              | [place]  |                 |
|                |              |  [address]              |                 |
+----------------+--------------+---------------------+-----------------+
 
 
 
 
 Care Team Providers
 
 
+-----------------------+------+-------------+
| Care Team Member Name | Role | Phone       |
+-----------------------+------+-------------+
 PCP  | Unavailable |
+-----------------------+------+-------------+
 
 
 
 Encounter Details
 
 
+--------+----------+---------------------+----------------------+-------------+
| Date   | Type     | Department          | Care Team            | Description |
+--------+----------+---------------------+----------------------+-------------+
| / | Abstract |   [facility] [place]         |   Marzena Moser  |             |
|    |          | NEPHROLOGY  [address]   | M, DO  [address]      |             |
|        |          | [address]   | [address]      |             |
|        |          | [place], [place]     | [place]      |             |
|        |          | [address]          | [address]  [phone]  |             |
|        |          | [phone]        |  [phone] (Fax)  |             |
+--------+----------+---------------------+----------------------+-------------+
 
 
 
 Social History
 
 
+--------------+-------+-----------+--------+------+
| Tobacco Use  | Types | Packs/Day | Years  | Date |
|              |       |           | Used   |      |
+--------------+-------+-----------+--------+------+
| Never Smoker |       |           |        |      |
+--------------+-------+-----------+--------+------+
 
 
 
+---------------------+---+---+---+
| Smokeless Tobacco:  |   |   |   |
| Never Used          |   |   |   |
+---------------------+---+---+---+
 
 
 
+---------------------------------------------------------------+
| Comments: some second hand smoke exposure, but fairly minimal |
+---------------------------------------------------------------+
 
 
 
+-------------+-------------+---------+----------+
| Alcohol Use | Drinks/Week | oz/Week | Comments |
+-------------+-------------+---------+----------+
| No          |             |         |          |
+-------------+-------------+---------+----------+
 
 
 
 
+------------------+---------------+
| Sex Assigned at  | Date Recorded |
| Birth            |               |
+------------------+---------------+
| Not on file      |               |
+------------------+---------------+
 
 
 
+----------------+-------------+-------------+
| Job Start Date | Occupation  | Industry    |
+----------------+-------------+-------------+
| Not on file    | Not on file | Not on file |
+----------------+-------------+-------------+
 
 
 
+----------------+--------------+------------+
| Travel History | Travel Start | Travel End |
+----------------+--------------+------------+
 
 
 
+-------------------------------------+
| No recent travel history available. |
+-------------------------------------+
 documented as of this encounter
 
 Plan of Treatment
 
 
+--------+-----------+------------+----------------------+-------------------+
| Date   | Type      | Specialty  | Care Team            | Description       |
+--------+-----------+------------+----------------------+-------------------+
| / | Office    | Cardiology |   Tonia Wilson,    |                   |
|    | Visit     |            | ARNJESSICA  [address]   |                   |
|        |           |            | [place]  |                   |
|        |           |            | [address]  [phone]  |                   |
|        |           |            |  [phone] (Fax)  |                   |
+--------+-----------+------------+----------------------+-------------------+
| / | Hospital  | Radiology  |   Popeye Townsend, |                   |
|    | Encounter |            |  MD  [address] |                   |
|        |           |            |  St.  [place],   |                   |
|        |           |            | [place] [address]             |                   |
|        |           |            | [phone]         |                   |
|        |           |            | [phone] (Fax)   |                   |
+--------+-----------+------------+----------------------+-------------------+
| / | Surgery   | Radiology  |   Popeye Townsend, | CV EP PPM SYSTEM  |
|    |           |            |  MD  [address] | IMPLANT           |
|        |           |            |  St.  [place],   |                   |
|        |           |            | [place] [address]             |                   |
|        |           |            | [phone]         |                   |
|        |           |            | [phone] (Fax)   |                   |
+--------+-----------+------------+----------------------+-------------------+
| 02/10/ | Clinical  | Cardiology |                      |                   |
|    | Support   |            |                      |                   |
+--------+-----------+------------+----------------------+-------------------+
| / | Office    | Cardiology |   Tonia Wilson,    |                   |
|    | Visit     |            | ARNP  [address]   |                   |
 
|        |           |            | St  [place], [place]  |                   |
|        |           |            | [address]  [phone]  |                   |
|        |           |            |  [phone] (Fax)  |                   |
+--------+-----------+------------+----------------------+-------------------+
| / | Off-Site  | Nephrology |   Marzena Moser  |                   |
|    | Visit     |            | DO ETIENNE  [address] [place] [address]      |                   |
|        |           |            | [address]      |                   |
|        |           |            | [place]      |                   |
|        |           |            | [address]  [phone]  |                   |
|        |           |            |  [phone] (Fax)  |                   |
+--------+-----------+------------+----------------------+-------------------+
 documented as of this encounter
 
 Procedures
 
 
+----------------+--------+------------+----------------------+----------------------+
| Procedure Name | Priori | Date/Time  | Associated Diagnosis | Comments             |
|                | ty     |            |                      |                      |
+----------------+--------+------------+----------------------+----------------------+
| EXTERNAL LAB:  | Routin | 2015 |                      |   Results for this   |
| URINALYSIS     | e      |            |                      | procedure are in the |
|                |        |            |                      |  results section.    |
+----------------+--------+------------+----------------------+----------------------+
 documented in this encounter
 
 Results
 External Lab: Urinalysis (2015)
 
+-------------+----------+-----------+------------+--------------+
| Component   | Value    | Ref Range | Performed  | Pathologist  |
|             |          |           | At         | Signature    |
+-------------+----------+-----------+------------+--------------+
| UA Blood,   | negative |           | EXTERNAL   |              |
| External    |          |           | LAB        |              |
+-------------+----------+-----------+------------+--------------+
| UA Glucose, | normal   |           | EXTERNAL   |              |
|  External   |          |           | LAB        |              |
+-------------+----------+-----------+------------+--------------+
| UA Ketones, | negative |           | EXTERNAL   |              |
|  External   |          |           | LAB        |              |
+-------------+----------+-----------+------------+--------------+
| UA Ph,      | 5        |           | EXTERNAL   |              |
| External    |          |           | LAB        |              |
+-------------+----------+-----------+------------+--------------+
| UA          | negative |           | EXTERNAL   |              |
| Proteins,   |          |           | LAB        |              |
| External    |          |           |            |              |
+-------------+----------+-----------+------------+--------------+
| UA RBC,     | 0        |           | EXTERNAL   |              |
| External    |          |           | LAB        |              |
+-------------+----------+-----------+------------+--------------+
| UA Specific | 1.006    |           | EXTERNAL   |              |
|  Gravity,   |          |           | LAB        |              |
| External    |          |           |            |              |
+-------------+----------+-----------+------------+--------------+
| UA          | negative |           | EXTERNAL   |              |
| Leukocyte   |          |           | LAB        |              |
| Esterase,   |          |           |            |              |
| External    |          |           |            |              |
 
+-------------+----------+-----------+------------+--------------+
 
 
 
+----------------+---------+--------------------+--------------+
| Performing     | Address | City/State/Zipcode | Phone Number |
| Organization   |         |                    |              |
+----------------+---------+--------------------+--------------+
|   EXTERNAL LAB |         |                    |              |
+----------------+---------+--------------------+--------------+
 documented in this encounter
 
 Visit Diagnoses
 Not on filedocumented in this encounter

## 2020-01-08 NOTE — XMS
Encounter Summary
  Created on: 2020
 
 Viviana Ricci
 External Reference #: [id number]
 : 47
 Sex: Female
 
 Demographics
 
 
+-----------------------+----------------------+
| Address               | [address]      |
|                       | [place]  [address] |
+-----------------------+----------------------+
| Home Phone            | [phone]      |
+-----------------------+----------------------+
| Preferred Language    | Unknown              |
+-----------------------+----------------------+
| Marital Status        | Single               |
+-----------------------+----------------------+
| Muslim Affiliation | [id number]                 |
+-----------------------+----------------------+
| Race                  | Unknown              |
+-----------------------+----------------------+
| Ethnic Group          | Unknown              |
+-----------------------+----------------------+
 
 
 Author
 
 
+--------------+--------------------------------------------+
| Author       | [facility]  |
|              | and [place]                                |
+--------------+--------------------------------------------+
| Organization | [facility]  |
|              | and [place]                                |
+--------------+--------------------------------------------+
| Address      | Unknown                                    |
+--------------+--------------------------------------------+
| Phone        | Unavailable                                |
+--------------+--------------------------------------------+
 
 
 
 Support
 
 
+----------------+--------------+---------------------+-----------------+
| Name           | Relationship | Address             | Phone           |
+----------------+--------------+---------------------+-----------------+
| Ada/Ed Radhames | ECON         | [place]              | [phone] |
|                |              | [place]  |                 |
|                |              |  [address]              |                 |
+----------------+--------------+---------------------+-----------------+
 
 
 
 
 Care Team Providers
 
 
+-----------------------+------+-------------+
| Care Team Member Name | Role | Phone       |
+-----------------------+------+-------------+
 PCP  | Unavailable |
+-----------------------+------+-------------+
 
 
 
 Encounter Details
 
 
+--------+----------+---------------------+----------------------+-------------+
| Date   | Type     | Department          | Care Team            | Description |
+--------+----------+---------------------+----------------------+-------------+
| / | Abstract |   [facility] [place]         |   Marzena Moser  |             |
|    |          | NEPHROLOGY  [address]   | M, DO  [address]      |             |
|        |          | [address]   | [address]      |             |
|        |          | [place], [place]     | [place]      |             |
|        |          | [address]          | [address]  [phone]  |             |
|        |          | [phone]        |  [phone] (Fax)  |             |
+--------+----------+---------------------+----------------------+-------------+
 
 
 
 Social History
 
 
+--------------+-------+-----------+--------+------+
| Tobacco Use  | Types | Packs/Day | Years  | Date |
|              |       |           | Used   |      |
+--------------+-------+-----------+--------+------+
| Never Smoker |       |           |        |      |
+--------------+-------+-----------+--------+------+
 
 
 
+---------------------+---+---+---+
| Smokeless Tobacco:  |   |   |   |
| Never Used          |   |   |   |
+---------------------+---+---+---+
 
 
 
+---------------------------------------------------------------+
| Comments: some second hand smoke exposure, but fairly minimal |
+---------------------------------------------------------------+
 
 
 
+-------------+-------------+---------+----------+
| Alcohol Use | Drinks/Week | oz/Week | Comments |
+-------------+-------------+---------+----------+
| No          |             |         |          |
+-------------+-------------+---------+----------+
 
 
 
 
+------------------+---------------+
| Sex Assigned at  | Date Recorded |
| Birth            |               |
+------------------+---------------+
| Not on file      |               |
+------------------+---------------+
 
 
 
+----------------+-------------+-------------+
| Job Start Date | Occupation  | Industry    |
+----------------+-------------+-------------+
| Not on file    | Not on file | Not on file |
+----------------+-------------+-------------+
 
 
 
+----------------+--------------+------------+
| Travel History | Travel Start | Travel End |
+----------------+--------------+------------+
 
 
 
+-------------------------------------+
| No recent travel history available. |
+-------------------------------------+
 documented as of this encounter
 
 Progress Maite Perez - 2017  8:40 AM PDTOutside record:  Diabetic patient eye examination
 report from [facility], dos:  17.  Sent to [facility].Electronically signed by 
Maite Raygoza at 2017  8:41 AM PDTdocumented in this encounter
 
 Plan of Treatment
 
 
+--------+-----------+------------+----------------------+-------------------+
| Date   | Type      | Specialty  | Care Team            | Description       |
+--------+-----------+------------+----------------------+-------------------+
| / | Office    | Cardiology |   Tonia Wilson,    |                   |
|    | Visit     |            | ARN  [address]   |                   |
|        |           |            | [facility] [place]  |                   |
|        |           |            | [address]  [phone]  |                   |
|        |           |            |  [phone] (Fax)  |                   |
+--------+-----------+------------+----------------------+-------------------+
| / | Hospital  | Radiology  |   Popeye Townsend, |                   |
|    | Encounter |            |  MD  [address] |                   |
|        |           |            |  St.  [place],   |                   |
|        |           |            | [place] [address]             |                   |
|        |           |            | [phone]         |                   |
|        |           |            | [phone] (Fax)   |                   |
+--------+-----------+------------+----------------------+-------------------+
| / | Surgery   | Radiology  |   Popeye Townsend, | CV EP PPM SYSTEM  |
|    |           |            |  MD  [address] | IMPLANT           |
|        |           |            |  St.  [place],   |                   |
|        |           |            | [place] [address]             |                   |
|        |           |            | [phone]         |                   |
|        |           |            | [phone] (Fax)   |                   |
+--------+-----------+------------+----------------------+-------------------+
 
| 02/10/ | Clinical  | Cardiology |                      |                   |
|    | Support   |            |                      |                   |
+--------+-----------+------------+----------------------+-------------------+
| / | Office    | Cardiology |   Tonia Wilson,    |                   |
|    | Visit     |            | ARNP  [address]   |                   |
|        |           |            | [place]  |                   |
|        |           |            | [address]  [phone]  |                   |
|        |           |            |  [phone] (Fax)  |                   |
+--------+-----------+------------+----------------------+-------------------+
| / | Off-Site  | Nephrology |   Marzena Moser  |                   |
2020   | Visit     |            | DO ETIENNE  [address] [place] [address]      |                   |
|        |           |            | [address]      |                   |
|        |           |            | [place]      |                   |
|        |           |            | [address]  [phone]  |                   |
|        |           |            |  [phone] (Fax)  |                   |
+--------+-----------+------------+----------------------+-------------------+
 documented as of this encounter
 
 Visit Diagnoses
 Not on filedocumented in this encounter

## 2020-01-08 NOTE — XMS
Encounter Summary
  Created on: 2020
 
 Viviana Ricci
 External Reference #: [id number]
 : 47
 Sex: Female
 
 Demographics
 
 
+-----------------------+----------------------+
| Address               | [address]      |
|                       | [place]  [address] |
+-----------------------+----------------------+
| Home Phone            | [phone]      |
+-----------------------+----------------------+
| Preferred Language    | Unknown              |
+-----------------------+----------------------+
| Marital Status        | Single               |
+-----------------------+----------------------+
| Bahai Affiliation | [id number]                 |
+-----------------------+----------------------+
| Race                  | Unknown              |
+-----------------------+----------------------+
| Ethnic Group          | Unknown              |
+-----------------------+----------------------+
 
 
 Author
 
 
+--------------+--------------------------------------------+
| Author       | [facility]  |
|              | and [facility]                                |
+--------------+--------------------------------------------+
| Organization | [facility]  |
|              | and [facility]                                |
+--------------+--------------------------------------------+
| Address      | Unknown                                    |
+--------------+--------------------------------------------+
| Phone        | Unavailable                                |
+--------------+--------------------------------------------+
 
 
 
 Support
 
 
+----------------+--------------+---------------------+-----------------+
| Name           | Relationship | Address             | Phone           |
+----------------+--------------+---------------------+-----------------+
| Ada/Ed Radhames | ECON         | [address]              | [phone] |
|                |              | [place]  |                 |
|                |              |  [address]              |                 |
+----------------+--------------+---------------------+-----------------+
 
 
 
 
 Care Team Providers
 
 
+------------------------+------+-----------------+
| Care Team Member Name  | Role | Phone           |
+------------------------+------+-----------------+
| Marzena Moser DO | PCP  | [phone] |
+------------------------+------+-----------------+
 
 
 
 Reason for Visit
 
 
+-------------------+----------+
| Reason            | Comments |
+-------------------+----------+
| Medication Refill |          |
+-------------------+----------+
 
 
 
 Encounter Details
 
 
+--------+--------+---------------------+----------------------+-------------------+
| Date   | Type   | Department          | Care Team            | Description       |
+--------+--------+---------------------+----------------------+-------------------+
| / | Refill |   [place]         |   Marzena Moser  | Medication Refill |
| 2018   |        | NEPHROLOGY  [address]   | M, DO  [address]      |                   |
|        |        | [address]   | [address]      |                   |
|        |        | [place], [place]     | [place], [place]      |                   |
|        |        | [address]          | [address]  [phone]  |                   |
|        |        | [phone]        |  [phone] (Fax)  |                   |
+--------+--------+---------------------+----------------------+-------------------+
 
 
 
 Social History
 
 
+--------------+-------+-----------+--------+------+
| Tobacco Use  | Types | Packs/Day | Years  | Date |
|              |       |           | Used   |      |
+--------------+-------+-----------+--------+------+
| Never Smoker |       |           |        |      |
+--------------+-------+-----------+--------+------+
 
 
 
+---------------------+---+---+---+
| Smokeless Tobacco:  |   |   |   |
| Never Used          |   |   |   |
+---------------------+---+---+---+
 
 
 
+---------------------------------------------------------------+
| Comments: some second hand smoke exposure, but fairly minimal |
 
+---------------------------------------------------------------+
 
 
 
+-------------+-------------+---------+----------+
| Alcohol Use | Drinks/Week | oz/Week | Comments |
+-------------+-------------+---------+----------+
| No          |             |         |          |
+-------------+-------------+---------+----------+
 
 
 
+------------------+---------------+
| Sex Assigned at  | Date Recorded |
| Birth            |               |
+------------------+---------------+
| Not on file      |               |
+------------------+---------------+
 
 
 
+----------------+-------------+-------------+
| Job Start Date | Occupation  | Industry    |
+----------------+-------------+-------------+
| Not on file    | Not on file | Not on file |
+----------------+-------------+-------------+
 
 
 
+----------------+--------------+------------+
| Travel History | Travel Start | Travel End |
+----------------+--------------+------------+
 
 
 
+-------------------------------------+
| No recent travel history available. |
+-------------------------------------+
 documented as of this encounter
 
 Plan of Treatment
 
 
+--------+-----------+------------+----------------------+-------------------+
| Date   | Type      | Specialty  | Care Team            | Description       |
+--------+-----------+------------+----------------------+-------------------+
| / | Office    | Cardiology |   Tonia Wilson,    |                   |
|    | Visit     |            | ARNP  [address]   |                   |
|        |           |            | [place], [place]  |                   |
|        |           |            | [address]  [phone]  |                   |
|        |           |            |  [phone] (Fax)  |                   |
+--------+-----------+------------+----------------------+-------------------+
| / | Hospital  | Radiology  |   Popeye Townsend, |                   |
|    | Encounter |            |  MD  [address] |                   |
|        |           |            |  [place],   |                   |
|        |           |            | [place] [address]             |                   |
|        |           |            | [phone]         |                   |
|        |           |            | [phone] (Fax)   |                   |
+--------+-----------+------------+----------------------+-------------------+
| / | Surgery   | Radiology  |   Popeye Townsend, | CV EP PPM SYSTEM  |
 
|    |           |            |  MD  [address] | IMPLANT           |
|        |           |            |  [place],   |                   |
|        |           |            | [place] [address]             |                   |
|        |           |            | [phone]         |                   |
|        |           |            | [phone] (Fax)   |                   |
+--------+-----------+------------+----------------------+-------------------+
| 02/10/ | Clinical  | Cardiology |                      |                   |
|    | Support   |            |                      |                   |
+--------+-----------+------------+----------------------+-------------------+
| / | Office    | Cardiology |   RakeshTonia andre,    |                   |
|    | Visit     |            | ARNP  [address]   |                   |
|        |           |            | [place]  |                   |
|        |           |            | [address] [phone]  |                   |
|        |           |            |  [phone] (Fax)  |                   |
+--------+-----------+------------+----------------------+-------------------+
| / | Off-Site  | Nephrology |   Marzena Moser  |                   |
|    | Visit     |            | DO ETIENNE  [address] [place] [address]      |                   |
|        |           |            | [address]      |                   |
|        |           |            | [place]      |                   |
|        |           |            | [address] [phone]  |                   |
|        |           |            |  [phone] (Fax)  |                   |
+--------+-----------+------------+----------------------+-------------------+
 documented as of this encounter
 
 Visit Diagnoses
 Not on filedocumented in this encounter

## 2020-01-08 NOTE — XMS
Encounter Summary
  Created on: 2020
 
 Viviana Ricci
 External Reference #: [id number]
 : 47
 Sex: Female
 
 Demographics
 
 
+-----------------------+----------------------+
| Address               | [address]      |
|                       | [place]  [address] |
+-----------------------+----------------------+
| Home Phone            | [phone]      |
+-----------------------+----------------------+
| Preferred Language    | Unknown              |
+-----------------------+----------------------+
| Marital Status        | Single               |
+-----------------------+----------------------+
| Christianity Affiliation | 1009                 |
+-----------------------+----------------------+
| Race                  | Unknown              |
+-----------------------+----------------------+
| Ethnic Group          | Unknown              |
+-----------------------+----------------------+
 
 
 Author
 
 
+--------------+--------------------------------------------+
| Author       | [facility]  |
|              | and [place]                                |
+--------------+--------------------------------------------+
| Organization | [facility]  |
|              | and [place]                                |
+--------------+--------------------------------------------+
| Address      | Unknown                                    |
+--------------+--------------------------------------------+
| Phone        | Unavailable                                |
+--------------+--------------------------------------------+
 
 
 
 Support
 
 
+----------------+--------------+---------------------+-----------------+
| Name           | Relationship | Address             | Phone           |
+----------------+--------------+---------------------+-----------------+
| Ada/Ed Radhames | ECON         | [address]              | [phone] |
|                |              | [place]  |                 |
|                |              |  [address]              |                 |
+----------------+--------------+---------------------+-----------------+
 
 
 
 
 Care Team Providers
 
 
+-----------------------+------+-------------+
| Care Team Member Name | Role | Phone       |
+-----------------------+------+-------------+
 PCP  | Unavailable |
+-----------------------+------+-------------+
 
 
 
 Encounter Details
 
 
+--------+----------+---------------------+----------------------+-------------+
| Date   | Type     | Department          | Care Team            | Description |
+--------+----------+---------------------+----------------------+-------------+
| 09/10/ | Abstract |   [facility] [place]         |   Marzena Moser  |             |
|    |          | NEPHROLOGY  [address]   | M, DO  [address]      |             |
|        |          | [address]   | [address]      |             |
|        |          | [place], [place]     | [place]      |             |
|        |          | [address]          | [address]  [phone]  |             |
|        |          | [phone]        |  [phone] (Fax)  |             |
+--------+----------+---------------------+----------------------+-------------+
 
 
 
 Social History
 
 
+--------------+-------+-----------+--------+------+
| Tobacco Use  | Types | Packs/Day | Years  | Date |
|              |       |           | Used   |      |
+--------------+-------+-----------+--------+------+
| Never Smoker |       |           |        |      |
+--------------+-------+-----------+--------+------+
 
 
 
+---------------------+---+---+---+
| Smokeless Tobacco:  |   |   |   |
| Never Used          |   |   |   |
+---------------------+---+---+---+
 
 
 
+---------------------------------------------------------------+
| Comments: some second hand smoke exposure, but fairly minimal |
+---------------------------------------------------------------+
 
 
 
+-------------+-------------+---------+----------+
| Alcohol Use | Drinks/Week | oz/Week | Comments |
+-------------+-------------+---------+----------+
| No          |             |         |          |
+-------------+-------------+---------+----------+
 
 
 
 
+------------------+---------------+
| Sex Assigned at  | Date Recorded |
| Birth            |               |
+------------------+---------------+
| Not on file      |               |
+------------------+---------------+
 
 
 
+----------------+-------------+-------------+
| Job Start Date | Occupation  | Industry    |
+----------------+-------------+-------------+
| Not on file    | Not on file | Not on file |
+----------------+-------------+-------------+
 
 
 
+----------------+--------------+------------+
| Travel History | Travel Start | Travel End |
+----------------+--------------+------------+
 
 
 
+-------------------------------------+
| No recent travel history available. |
+-------------------------------------+
 documented as of this encounter
 
 Plan of Treatment
 
 
+--------+-----------+------------+----------------------+-------------------+
| Date   | Type      | Specialty  | Care Team            | Description       |
+--------+-----------+------------+----------------------+-------------------+
| / | Office    | Cardiology |   Tonia Wilson,    |                   |
|    | Visit     |            | ARNJESISCA  [address]   |                   |
|        |           |            | [place]  |                   |
|        |           |            | [address]  [phone]  |                   |
|        |           |            |  [phone] (Fax)  |                   |
+--------+-----------+------------+----------------------+-------------------+
| / | Hospital  | Radiology  |   Popeye Townsend, |                   |
|    | Encounter |            |  MD  [address] |                   |
|        |           |            |  St.  [place],   |                   |
|        |           |            | [place] [address]             |                   |
|        |           |            | [phone]         |                   |
|        |           |            | [phone] (Fax)   |                   |
+--------+-----------+------------+----------------------+-------------------+
| / | Surgery   | Radiology  |   Popeye Townsend, | CV EP PPM SYSTEM  |
|    |           |            |  MD  [address] | IMPLANT           |
|        |           |            |  St.  [place],   |                   |
|        |           |            | [place] [address]             |                   |
|        |           |            | [phone]         |                   |
|        |           |            | [phone] (Fax)   |                   |
+--------+-----------+------------+----------------------+-------------------+
| 02/10/ | Clinical  | Cardiology |                      |                   |
|    | Support   |            |                      |                   |
+--------+-----------+------------+----------------------+-------------------+
| / | Office    | Cardiology |   Tonia Wilson,    |                   |
|    | Visit     |            | ARNP  [address]   |                   |
 
|        |           |            | St  [place], [place]  |                   |
|        |           |            | [address]  [phone]  |                   |
|        |           |            |  [phone] (Fax)  |                   |
+--------+-----------+------------+----------------------+-------------------+
| / | Off-Site  | Nephrology |   Marzena Moser  |                   |
|    | Visit     |            | DO ETIENNE  [address] [place] [address]      |                   |
|        |           |            | [address]      |                   |
|        |           |            | [place]      |                   |
|        |           |            | [address]  [phone]  |                   |
|        |           |            |  [phone] (Fax)  |                   |
+--------+-----------+------------+----------------------+-------------------+
 documented as of this encounter
 
 Procedures
 
 
+----------------+--------+------------+----------------------+----------------------+
| Procedure Name | Priori | Date/Time  | Associated Diagnosis | Comments             |
|                | ty     |            |                      |                      |
+----------------+--------+------------+----------------------+----------------------+
| EXTERNAL LAB:  | Routin | 2015 |                      |   Results for this   |
| URINALYSIS     | e      |            |                      | procedure are in the |
|                |        |            |                      |  results section.    |
+----------------+--------+------------+----------------------+----------------------+
 documented in this encounter
 
 Results
 External Lab: Urinalysis (2015)
 
+-------------+----------+-----------+------------+--------------+
| Component   | Value    | Ref Range | Performed  | Pathologist  |
|             |          |           | At         | Signature    |
+-------------+----------+-----------+------------+--------------+
| UA Blood,   | negative |           | EXTERNAL   |              |
| External    |          |           | LAB        |              |
+-------------+----------+-----------+------------+--------------+
| UA Glucose, | normal   |           | EXTERNAL   |              |
|  External   |          |           | LAB        |              |
+-------------+----------+-----------+------------+--------------+
| UA Ketones, | negative |           | EXTERNAL   |              |
|  External   |          |           | LAB        |              |
+-------------+----------+-----------+------------+--------------+
| UA Ph,      | 7        |           | EXTERNAL   |              |
| External    |          |           | LAB        |              |
+-------------+----------+-----------+------------+--------------+
| UA          | negative |           | EXTERNAL   |              |
| Proteins,   |          |           | LAB        |              |
| External    |          |           |            |              |
+-------------+----------+-----------+------------+--------------+
| UA RBC,     | 0        |           | EXTERNAL   |              |
| External    |          |           | LAB        |              |
+-------------+----------+-----------+------------+--------------+
| UA Specific | 1.010    |           | EXTERNAL   |              |
|  Gravity,   |          |           | LAB        |              |
| External    |          |           |            |              |
+-------------+----------+-----------+------------+--------------+
| UA          | 25 (A)   | 0 - 0     | EXTERNAL   |              |
| Leukocyte   |          |           | LAB        |              |
| Esterase,   |          |           |            |              |
| External    |          |           |            |              |
 
+-------------+----------+-----------+------------+--------------+
 
 
 
+----------------+---------+--------------------+--------------+
| Performing     | Address | City/State/Zipcode | Phone Number |
| Organization   |         |                    |              |
+----------------+---------+--------------------+--------------+
|   EXTERNAL LAB |         |                    |              |
+----------------+---------+--------------------+--------------+
 documented in this encounter
 
 Visit Diagnoses
 Not on filedocumented in this encounter

## 2020-01-08 NOTE — XMS
Encounter Summary
  Created on: 2020
 
 Viviana Ricci
 External Reference #: [id number]
 : 47
 Sex: Female
 
 Demographics
 
 
+-----------------------+----------------------+
| Address               | [address]      |
|                       | [place]  [address] |
+-----------------------+----------------------+
| Home Phone            | [phone]      |
+-----------------------+----------------------+
| Preferred Language    | Unknown              |
+-----------------------+----------------------+
| Marital Status        | Single               |
+-----------------------+----------------------+
| Taoist Affiliation | 1009                 |
+-----------------------+----------------------+
| Race                  | Unknown              |
+-----------------------+----------------------+
| Ethnic Group          | Unknown              |
+-----------------------+----------------------+
 
 
 Author
 
 
+--------------+--------------------------------------------+
| Author       | [facility]  |
|              | and [place]                                |
+--------------+--------------------------------------------+
| Organization | [facility]  |
|              | and [place]                                |
+--------------+--------------------------------------------+
| Address      | Unknown                                    |
+--------------+--------------------------------------------+
| Phone        | Unavailable                                |
+--------------+--------------------------------------------+
 
 
 
 Support
 
 
+----------------+--------------+---------------------+-----------------+
| Name           | Relationship | Address             | Phone           |
+----------------+--------------+---------------------+-----------------+
| Ada/Ed Radhames | ECON         | [address]              | [phone] |
|                |              | [place]  |                 |
|                |              |  [address]              |                 |
+----------------+--------------+---------------------+-----------------+
 
 
 
 
 Care Team Providers
 
 
+------------------------+------+-----------------+
| Care Team Member Name  | Role | Phone           |
+------------------------+------+-----------------+
| Marzena Moser DO | PCP  | [phone] |
+------------------------+------+-----------------+
 
 
 
 Reason for Visit
 
 
+-------------+----------+
| Reason      | Comments |
+-------------+----------+
| Lab Results |          |
+-------------+----------+
 
 
 
 Encounter Details
 
 
+--------+-----------+---------------------+----------------------+-------------+
| Date   | Type      | Department          | Care Team            | Description |
+--------+-----------+---------------------+----------------------+-------------+
| / | Telephone |   [facility]         |   Marzena Moser  | Lab Results |
| 2018   |           | NEPHROLOGY  [address]   | M, DO  [address]      |             |
|        |           | [address]   | [address]      |             |
|        |           | [place], [place]     | [place], [place]      |             |
|        |           | [address]          | [address]  [phone]  |             |
|        |           | [phone]        |  [phone] (Fax)  |             |
+--------+-----------+---------------------+----------------------+-------------+
 
 
 
 Social History
 
 
+--------------+-------+-----------+--------+------+
| Tobacco Use  | Types | Packs/Day | Years  | Date |
|              |       |           | Used   |      |
+--------------+-------+-----------+--------+------+
| Never Smoker |       |           |        |      |
+--------------+-------+-----------+--------+------+
 
 
 
+---------------------+---+---+---+
| Smokeless Tobacco:  |   |   |   |
| Never Used          |   |   |   |
+---------------------+---+---+---+
 
 
 
+---------------------------------------------------------------+
| Comments: some second hand smoke exposure, but fairly minimal |
 
+---------------------------------------------------------------+
 
 
 
+-------------+-------------+---------+----------+
| Alcohol Use | Drinks/Week | oz/Week | Comments |
+-------------+-------------+---------+----------+
| No          |             |         |          |
+-------------+-------------+---------+----------+
 
 
 
+------------------+---------------+
| Sex Assigned at  | Date Recorded |
| Birth            |               |
+------------------+---------------+
| Not on file      |               |
+------------------+---------------+
 
 
 
+----------------+-------------+-------------+
| Job Start Date | Occupation  | Industry    |
+----------------+-------------+-------------+
| Not on file    | Not on file | Not on file |
+----------------+-------------+-------------+
 
 
 
+----------------+--------------+------------+
| Travel History | Travel Start | Travel End |
+----------------+--------------+------------+
 
 
 
+-------------------------------------+
| No recent travel history available. |
+-------------------------------------+
 documented as of this encounter
 
 Plan of Treatment
 
 
+--------+-----------+------------+----------------------+-------------------+
| Date   | Type      | Specialty  | Care Team            | Description       |
+--------+-----------+------------+----------------------+-------------------+
| / | Office    | Cardiology |   Tonia Wilson,    |                   |
|    | Visit     |            | ARNJESSICA  [address]   |                   |
|        |           |            | St  [place], [place]  |                   |
|        |           |            | [address]  [phone]  |                   |
|        |           |            |  [phone] (Fax)  |                   |
+--------+-----------+------------+----------------------+-------------------+
| / | Hospital  | Radiology  |   Popeye Townsend, |                   |
|    | Encounter |            |  MD  [address] |                   |
|        |           |            |  St.  [place],   |                   |
|        |           |            | [place] [address]             |                   |
|        |           |            | [phone]         |                   |
|        |           |            | [phone] (Fax)   |                   |
+--------+-----------+------------+----------------------+-------------------+
| / | Surgery   | Radiology  |   Popeye Townsend, | CV EP PPM SYSTEM  |
 
|    |           |            |  MD  [address] | IMPLANT           |
|        |           |            |  St.  [place],   |                   |
|        |           |            | [place] [address]             |                   |
|        |           |            | [phone]         |                   |
|        |           |            | [phone] (Fax)   |                   |
+--------+-----------+------------+----------------------+-------------------+
| 02/10/ | Clinical  | Cardiology |                      |                   |
|    | Support   |            |                      |                   |
+--------+-----------+------------+----------------------+-------------------+
| / | Office    | Cardiology |   RakeshmaxxalfredoArlena,    |                   |
|    | Visit     |            | ARNP  [address]   |                   |
|        |           |            | [place]  |                   |
|        |           |            | [address]  [phone]  |                   |
|        |           |            |  [phone] (Fax)  |                   |
+--------+-----------+------------+----------------------+-------------------+
| / | Off-Site  | Nephrology |   Marzena Moser  |                   |
|    | Visit     |            | DO ETIENNE  [address]      |                   |
|        |           |            | [address]      |                   |
|        |           |            | [place]      |                   |
|        |           |            | [address]  [phone]  |                   |
|        |           |            |  [phone] (Fax)  |                   |
+--------+-----------+------------+----------------------+-------------------+
 documented as of this encounter
 
 Visit Diagnoses
 
 
+---------------------------------+
| Diagnosis                       |
+---------------------------------+
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
+---------------------------------+
 documented in this encounter

## 2020-01-08 NOTE — XMS
Encounter Summary
  Created on: 2020
 
 Viviana Ricci
 External Reference #: [id number]
 : 47
 Sex: Female
 
 Demographics
 
 
+-----------------------+----------------------+
| Address               | [address]      |
|                       | [place]  [address] |
+-----------------------+----------------------+
| Home Phone            | [phone]      |
+-----------------------+----------------------+
| Preferred Language    | Unknown              |
+-----------------------+----------------------+
| Marital Status        | Single               |
+-----------------------+----------------------+
| Latter day Affiliation | 1009                 |
+-----------------------+----------------------+
| Race                  | Unknown              |
+-----------------------+----------------------+
| Ethnic Group          | Unknown              |
+-----------------------+----------------------+
 
 
 Author
 
 
+--------------+--------------------------------------------+
| Author       | [facility]  |
|              | and [place]                                |
+--------------+--------------------------------------------+
| Organization | [facility]  |
|              | and [place]                                |
+--------------+--------------------------------------------+
| Address      | Unknown                                    |
+--------------+--------------------------------------------+
| Phone        | Unavailable                                |
+--------------+--------------------------------------------+
 
 
 
 Support
 
 
+----------------+--------------+---------------------+-----------------+
| Name           | Relationship | Address             | Phone           |
+----------------+--------------+---------------------+-----------------+
| Ada/Ed Radhames | ECON         | [address]              | [phone] |
|                |              | [place]  |                 |
|                |              |  [address]              |                 |
+----------------+--------------+---------------------+-----------------+
 
 
 
 
 Care Team Providers
 
 
+-----------------------+------+-------------+
| Care Team Member Name | Role | Phone       |
+-----------------------+------+-------------+
 PCP  | Unavailable |
+-----------------------+------+-------------+
 
 
 
 Reason for Visit
 
 
+--------+------------------+
| Reason | Comments         |
+--------+------------------+
| Other  | medication level |
+--------+------------------+
 
 
 
 Encounter Details
 
 
+--------+-----------+----------------------+----------------------+--------------------+
| Date   | Type      | Department           | Care Team            | Description        |
+--------+-----------+----------------------+----------------------+--------------------+
| / | Telephone |   [facility]  |   Marzena Moser  | Other (medication  |
|    |           | Care  [address]  | M, DO  [address]      | level)             |
|        |           | [address]  [place],   | [address]      |                    |
|        |           | [place] [address]        | [place], [place]      |                    |
|        |           | [phone]         | [id number] [phone]  |                    |
|        |           |                      |  [phone] (Fax)  |                    |
+--------+-----------+----------------------+----------------------+--------------------+
 
 
 
 Social History
 
 
+--------------+-------+-----------+--------+------+
| Tobacco Use  | Types | Packs/Day | Years  | Date |
|              |       |           | Used   |      |
+--------------+-------+-----------+--------+------+
| Never Smoker |       |           |        |      |
+--------------+-------+-----------+--------+------+
 
 
 
+---------------------+---+---+---+
| Smokeless Tobacco:  |   |   |   |
| Never Used          |   |   |   |
+---------------------+---+---+---+
 
 
 
+-------------+-------------+---------+----------+
| Alcohol Use | Drinks/Week | oz/Week | Comments |
 
+-------------+-------------+---------+----------+
| No          |             |         |          |
+-------------+-------------+---------+----------+
 
 
 
+------------------+---------------+
| Sex Assigned at  | Date Recorded |
| Birth            |               |
+------------------+---------------+
| Not on file      |               |
+------------------+---------------+
 
 
 
+----------------+-------------+-------------+
| Job Start Date | Occupation  | Industry    |
+----------------+-------------+-------------+
| Not on file    | Not on file | Not on file |
+----------------+-------------+-------------+
 
 
 
+----------------+--------------+------------+
| Travel History | Travel Start | Travel End |
+----------------+--------------+------------+
 
 
 
+-------------------------------------+
| No recent travel history available. |
+-------------------------------------+
 documented as of this encounter
 
 Plan of Treatment
 
 
+--------+-----------+------------+----------------------+-------------------+
| Date   | Type      | Specialty  | Care Team            | Description       |
+--------+-----------+------------+----------------------+-------------------+
| / | Office    | Cardiology |   Tonia Wilson,    |                   |
|    | Visit     |            | [address]   |                   |
|        |           |            | St [place] [facility], [place]  |                   |
|        |           |            | [id number] [phone]  |                   |
|        |           |            |  [phone] (Fax)  |                   |
+--------+-----------+------------+----------------------+-------------------+
| / | Hospital  | Radiology  |   Popeye Townsend, |                   |
|    | Encounter |            |  MD  [address] |                   |
|        |           |            |  St.  [place],   |                   |
|        |           |            | [place] [address]             |                   |
|        |           |            | [phone]         |                   |
|        |           |            | [phone] (Fax)   |                   |
+--------+-----------+------------+----------------------+-------------------+
| / | Surgery   | Radiology  |   Popeye Townsend, | CV EP PPM SYSTEM  |
|    |           |            |  MD  [address] | IMPLANT           |
|        |           |            |  St.  [place],   |                   |
|        |           |            | [place] [address]             |                   |
|        |           |            | [phone]         |                   |
|        |           |            | [phone] (Fax)   |                   |
+--------+-----------+------------+----------------------+-------------------+
 
| 02/10/ | Clinical  | Cardiology |                      |                   |
|    | Support   |            |                      |                   |
+--------+-----------+------------+----------------------+-------------------+
| / | Office    | Cardiology |   Tonia Wilson,    |                   |
|    | Visit     |            | ARNJESSICA  [address]   |                   |
|        |           |            | St  [place], [place]  |                   |
|        |           |            | [address]  [phone]  |                   |
|        |           |            |  [phone] (Fax)  |                   |
+--------+-----------+------------+----------------------+-------------------+
| / | Off-Site  | Nephrology |   Marzena Moser  |                   |
|    | Visit     |            | DO ETIENNE  [address] [place] [address]      |                   |
|        |           |            | [address]      |                   |
|        |           |            | [place]      |                   |
|        |           |            | [address]  [phone]  |                   |
|        |           |            |  [phone] (Fax)  |                   |
+--------+-----------+------------+----------------------+-------------------+
 documented as of this encounter
 
 Visit Diagnoses
 Not on filedocumented in this encounter

## 2020-01-08 NOTE — XMS
Encounter Summary
  Created on: 2020
 
 Viviana Ricci
 External Reference #: [id number]
 : 47
 Sex: Female
 
 Demographics
 
 
+-----------------------+----------------------+
| Address               | [address]      |
|                       | [place]  [address] |
+-----------------------+----------------------+
| Home Phone            | [phone]      |
+-----------------------+----------------------+
| Preferred Language    | Unknown              |
+-----------------------+----------------------+
| Marital Status        | Single               |
+-----------------------+----------------------+
| Congregational Affiliation | [id number]                 |
+-----------------------+----------------------+
| Race                  | Unknown              |
+-----------------------+----------------------+
| Ethnic Group          | Unknown              |
+-----------------------+----------------------+
 
 
 Author
 
 
+--------------+--------------------------------------------+
| Author       | [facility]  |
|              | and [place]                                |
+--------------+--------------------------------------------+
| Organization | [facility]  |
|              | and [place]                                |
+--------------+--------------------------------------------+
| Address      | Unknown                                    |
+--------------+--------------------------------------------+
| Phone        | Unavailable                                |
+--------------+--------------------------------------------+
 
 
 
 Support
 
 
+----------------+--------------+---------------------+-----------------+
| Name           | Relationship | Address             | Phone           |
+----------------+--------------+---------------------+-----------------+
| Ada/Ed Radhames | ECON         | [place]              | [phone] |
|                |              | [place]  |                 |
|                |              |  [address]              |                 |
+----------------+--------------+---------------------+-----------------+
 
 
 
 
 Care Team Providers
 
 
+-----------------------+------+-------------+
| Care Team Member Name | Role | Phone       |
+-----------------------+------+-------------+
 PCP  | Unavailable |
+-----------------------+------+-------------+
 
 
 
 Reason for Visit
 
 
+-------------------+----------+
| Reason            | Comments |
+-------------------+----------+
| Medication Refill |          |
+-------------------+----------+
 
 
 
 Encounter Details
 
 
+--------+--------+---------------------+----------------------+-------------------+
| Date   | Type   | Department          | Care Team            | Description       |
+--------+--------+---------------------+----------------------+-------------------+
| / | Refill |   [facility]         |   Marzena Moser  | Medication Refill |
| 2017   |        | NEPHROLOGY  [address]   | M, DO  [address]      |                   |
|        |        | [address]   | [address]      |                   |
|        |        | [place], [place]     | [place], [place]      |                   |
|        |        | [address]          | [address]  [phone]  |                   |
|        |        | [phone]        |  [phone] (Fax)  |                   |
+--------+--------+---------------------+----------------------+-------------------+
 
 
 
 Social History
 
 
+--------------+-------+-----------+--------+------+
| Tobacco Use  | Types | Packs/Day | Years  | Date |
|              |       |           | Used   |      |
+--------------+-------+-----------+--------+------+
| Never Smoker |       |           |        |      |
+--------------+-------+-----------+--------+------+
 
 
 
+---------------------+---+---+---+
| Smokeless Tobacco:  |   |   |   |
| Never Used          |   |   |   |
+---------------------+---+---+---+
 
 
 
+---------------------------------------------------------------+
| Comments: some second hand smoke exposure, but fairly minimal |
 
+---------------------------------------------------------------+
 
 
 
+-------------+-------------+---------+----------+
| Alcohol Use | Drinks/Week | oz/Week | Comments |
+-------------+-------------+---------+----------+
| No          |             |         |          |
+-------------+-------------+---------+----------+
 
 
 
+------------------+---------------+
| Sex Assigned at  | Date Recorded |
| Birth            |               |
+------------------+---------------+
| Not on file      |               |
+------------------+---------------+
 
 
 
+----------------+-------------+-------------+
| Job Start Date | Occupation  | Industry    |
+----------------+-------------+-------------+
| Not on file    | Not on file | Not on file |
+----------------+-------------+-------------+
 
 
 
+----------------+--------------+------------+
| Travel History | Travel Start | Travel End |
+----------------+--------------+------------+
 
 
 
+-------------------------------------+
| No recent travel history available. |
+-------------------------------------+
 documented as of this encounter
 
 Plan of Treatment
 
 
+--------+-----------+------------+----------------------+-------------------+
| Date   | Type      | Specialty  | Care Team            | Description       |
+--------+-----------+------------+----------------------+-------------------+
| / | Office    | Cardiology |   HellalfredoTonia,    |                   |
|    | Visit     |            | ARNP  [address]   |                   |
|        |           |            | St  [place], [place]  |                   |
|        |           |            | [address]  [phone]  |                   |
|        |           |            |  [phone] (Fax)  |                   |
+--------+-----------+------------+----------------------+-------------------+
| / | Hospital  | Radiology  |   Popeye Townsend, |                   |
|    | Encounter |            |  MD  [address] |                   |
|        |           |            |  St.  [place],   |                   |
|        |           |            | [place] [address]             |                   |
|        |           |            | [phone]         |                   |
|        |           |            | [phone] (Fax)   |                   |
+--------+-----------+------------+----------------------+-------------------+
| / | Surgery   | Radiology  |   Popeye Townsend, | CV EP PPM SYSTEM  |
 
|    |           |            |  MD  [address] | IMPLANT           |
|        |           |            |  St.  [place],   |                   |
|        |           |            | [place] [address]             |                   |
|        |           |            | [phone]         |                   |
|        |           |            | [phone] (Fax)   |                   |
+--------+-----------+------------+----------------------+-------------------+
| 02/10/ | Clinical  | Cardiology |                      |                   |
|    | Support   |            |                      |                   |
+--------+-----------+------------+----------------------+-------------------+
| / | Office    | Cardiology |   Tonia Wilson,    |                   |
|    | Visit     |            | ARNP  [address]   |                   |
|        |           |            | [place]  |                   |
|        |           |            | [address]  [phone]  |                   |
|        |           |            |  [phone] (Fax)  |                   |
+--------+-----------+------------+----------------------+-------------------+
| / | Off-Site  | Nephrology |   Marzena Moser  |                   |
|    | Visit     |            | DO ETIENNE  [address] [place] [address]      |                   |
|        |           |            | [address]      |                   |
|        |           |            | [place]      |                   |
|        |           |            | [address]  [phone]  |                   |
|        |           |            |  [phone] (Fax)  |                   |
+--------+-----------+------------+----------------------+-------------------+
 documented as of this encounter
 
 Visit Diagnoses
 Not on filedocumented in this encounter

## 2020-01-08 NOTE — XMS
DATE: 03/26/2017



The patient is in bed in no acute distress.



PHYSICAL EXAMINATION:

GENERAL:  The patient apparently had periods of more confusion last night.  He is on restraints.

VITAL SIGNS:  Temperature of 98.  Blood pressure is 145/91, respiratory rate of 22, heart rate of 112
.

HEENT:  Unremarkable.

NECK:  Supple.

LUNGS:  Have decreased breath sounds.

HEART:  Normal S1, S2.

ABDOMEN:  Soft, nontender.



LABORATORY EXAMINATION:  Reveals a white count of 14,300, hemoglobin of 13, platelets of 144.  Coagul
ation is noted.  Chemistries reveal the BUN of 59, creatinine of 1.7.  C-reactive protein is noted wi
th LFT elevations, normal alk phos, CK elevated.  Urinalysis is noted.  Microbiology reveals a group 
G strep in the blood with repeat blood cultures negative.  The left foot culture is sensitive to stap
h aureus.  



Review of the orders reveals the patient to be on ceftriaxone 2 grams daily.  



The patient had an echo on the 23rd.  No evidence of vegetations.



Dr. Lagunas's note is reviewed, and Dr. Andrade's note is reviewed.



ASSESSMENT AND PLAN:  This is an 82-year-old male with past medical history significant for dementia,
 chronic renal failure, obesity, body mass index of 38, admitted on this admission with severe sepsis
 with acute kidney injury on top of chronic kidney failure.  The patient was admitted with severe sep
sis, had group G strep bacteremia, sensitive Staphylococcus aureus lower extremity, left foot celluli
tis, currently on ceftriaxone 2 grams daily and must rule out underlying osteomyelitis, but the cellu
litis is probable cause of the group G strep with a negative echo, most likely will need prolonged an
tibiotic therapy.  Currently on ceftriaxone day #4 of 28 days of ceftriaxone 2 grams q. 24 hours with
 a CBC, SMA-18, sed rate, C-reactive protein once weekly in a patient has elevated C-reactive protein
, elevated CPK, and LFTs, and is more confusion than her baseline.  We will follow closely with you.





__________________________________________

Lj Baca MD







cc:



DD: 03/26/2017 09:34:09  350

TT: 03/26/2017 09:50:35

Confirmation # [id number]

Dictation # [id number]

jn Encounter Summary
  Created on: 2020
 
 Viviana Ricci
 External Reference #: [id number]
 : 47
 Sex: Female
 
 Demographics
 
 
+-----------------------+----------------------+
| Address               | [address]      |
|                       | [place]  [address] |
+-----------------------+----------------------+
| Home Phone            | [phone]      |
+-----------------------+----------------------+
| Preferred Language    | Unknown              |
+-----------------------+----------------------+
| Marital Status        | Single               |
+-----------------------+----------------------+
| Confucianism Affiliation | 1009                 |
+-----------------------+----------------------+
| Race                  | Unknown              |
+-----------------------+----------------------+
| Ethnic Group          | Unknown              |
+-----------------------+----------------------+
 
 
 Author
 
 
+--------------+--------------------------------------------+
| Author       | [facility]  |
|              | and [place]                                |
+--------------+--------------------------------------------+
| Organization | [facility]  |
|              | and [place]                                |
+--------------+--------------------------------------------+
| Address      | Unknown                                    |
+--------------+--------------------------------------------+
| Phone        | Unavailable                                |
+--------------+--------------------------------------------+
 
 
 
 Support
 
 
+----------------+--------------+---------------------+-----------------+
| Name           | Relationship | Address             | Phone           |
+----------------+--------------+---------------------+-----------------+
| Ada/Ed Radhames | ECON         | [place]              | [phone] |
|                |              | [place]  |                 |
|                |              |  [address]              |                 |
+----------------+--------------+---------------------+-----------------+
 
 
 
 
 Care Team Providers
 
 
+------------------------+------+-----------------+
| Care Team Member Name  | Role | Phone           |
+------------------------+------+-----------------+
| Marzena Moser DO | PCP  | [phone] |
+------------------------+------+-----------------+
 
 
 
 Reason for Visit
 
 
+-------------+----------+
| Reason      | Comments |
+-------------+----------+
| Medication  |          |
| Management  |          |
+-------------+----------+
 
 
 
 Encounter Details
 
 
+--------+-----------+---------------------+----------------------+-------------+
| Date   | Type      | Department          | Care Team            | Description |
+--------+-----------+---------------------+----------------------+-------------+
| / | Telephone |   [facility] [place]         |   ChengmaxxMarzena  | Medication  |
| 2019   |           | NEPHROLOGY  [address]   | M, DO  [address]      | Management  |
|        |           | [address]   | [address]      |             |
|        |           | [place], [place]     | [place], [place]      |             |
|        |           | [address]          | [address]  [phone]  |             |
|        |           | [phone]        |  [phone] (Fax)  |             |
+--------+-----------+---------------------+----------------------+-------------+
 
 
 
 Social History
 
 
+--------------+-------+-----------+--------+------+
| Tobacco Use  | Types | Packs/Day | Years  | Date |
|              |       |           | Used   |      |
+--------------+-------+-----------+--------+------+
| Never Smoker |       |           |        |      |
+--------------+-------+-----------+--------+------+
 
 
 
+---------------------+---+---+---+
| Smokeless Tobacco:  |   |   |   |
| Never Used          |   |   |   |
+---------------------+---+---+---+
 
 
 
+---------------------------------------------------------------+
 
| Comments: some second hand smoke exposure, but fairly minimal |
+---------------------------------------------------------------+
 
 
 
+-------------+-------------+---------+----------+
| Alcohol Use | Drinks/Week | oz/Week | Comments |
+-------------+-------------+---------+----------+
| No          |             |         |          |
+-------------+-------------+---------+----------+
 
 
 
+------------------+---------------+
| Sex Assigned at  | Date Recorded |
| Birth            |               |
+------------------+---------------+
| Not on file      |               |
+------------------+---------------+
 
 
 
+----------------+-------------+-------------+
| Job Start Date | Occupation  | Industry    |
+----------------+-------------+-------------+
| Not on file    | Not on file | Not on file |
+----------------+-------------+-------------+
 
 
 
+----------------+--------------+------------+
| Travel History | Travel Start | Travel End |
+----------------+--------------+------------+
 
 
 
+-------------------------------------+
| No recent travel history available. |
+-------------------------------------+
 documented as of this encounter
 
 Plan of Treatment
 
 
+--------+-----------+------------+----------------------+-------------------+
| Date   | Type      | Specialty  | Care Team            | Description       |
+--------+-----------+------------+----------------------+-------------------+
| / | Office    | Cardiology |   RakeshmaxxArlen fuentesa,    |                   |
|    | Visit     |            | ARNP  [address]   |                   |
|        |           |            | St [place], [place]  |                   |
|        |           |            | [address]  [phone]  |                   |
|        |           |            |  [phone] (Fax)  |                   |
+--------+-----------+------------+----------------------+-------------------+
| / | Hospital  | Radiology  |   Popeye Townsend, |                   |
|    | Encounter |            |  MD  [address] |                   |
|        |           |            |  [place],   |                   |
|        |           |            | [place] [address]             |                   |
|        |           |            | [phone]         |                   |
|        |           |            | [phone] (Fax)   |                   |
+--------+-----------+------------+----------------------+-------------------+
 
| / | Surgery   | Radiology  |   Popeye Townsend, | CV EP PPM SYSTEM  |
|    |           |            |  MD  [address] | IMPLANT           |
|        |           |            |  St.  [place],   |                   |
|        |           |            | [place] [address]             |                   |
|        |           |            | [phone]         |                   |
|        |           |            | [phone] (Fax)   |                   |
+--------+-----------+------------+----------------------+-------------------+
| 02/10/ | Clinical  | Cardiology |                      |                   |
|    | Support   |            |                      |                   |
+--------+-----------+------------+----------------------+-------------------+
| / | Office    | Cardiology |   Tonia Wilson,    |                   |
|    | Visit     |            | ARNP  [address]   |                   |
|        |           |            | [place]  |                   |
|        |           |            | [address]  [phone]  |                   |
|        |           |            |  [phone] (Fax)  |                   |
+--------+-----------+------------+----------------------+-------------------+
| / | Off-Site  | Nephrology |   Marzena Moser  |                   |
|    | Visit     |            | M, DO  [address]      |                   |
|        |           |            | [address]      |                   |
|        |           |            | [place]      |                   |
|        |           |            | [address] [phone]  |                   |
|        |           |            |  [phone] (Fax)  |                   |
+--------+-----------+------------+----------------------+-------------------+
 documented as of this encounter
 
 Visit Diagnoses
 Not on filedocumented in this encounter

## 2020-01-08 NOTE — XMS
Encounter Summary
  Created on: 2020
 
 Viviana Ricci
 External Reference #: [id number]
 : 47
 Sex: Female
 
 Demographics
 
 
+-----------------------+----------------------+
| Address               | [address]      |
|                       | [place]  [address] |
+-----------------------+----------------------+
| Home Phone            | [phone]      |
+-----------------------+----------------------+
| Preferred Language    | Unknown              |
+-----------------------+----------------------+
| Marital Status        | Single               |
+-----------------------+----------------------+
| Taoism Affiliation | [id number]                 |
+-----------------------+----------------------+
| Race                  | Unknown              |
+-----------------------+----------------------+
| Ethnic Group          | Unknown              |
+-----------------------+----------------------+
 
 
 Author
 
 
+--------------+--------------------------------------------+
| Author       | [facility]  |
|              | and [facility]                                |
+--------------+--------------------------------------------+
| Organization | [facility]  |
|              | and [facility]                                |
+--------------+--------------------------------------------+
| Address      | Unknown                                    |
+--------------+--------------------------------------------+
| Phone        | Unavailable                                |
+--------------+--------------------------------------------+
 
 
 
 Support
 
 
+----------------+--------------+---------------------+-----------------+
| Name           | Relationship | Address             | Phone           |
+----------------+--------------+---------------------+-----------------+
| Ada/Ed Radhames | ECON         | [address]              | [phone] |
|                |              | [place], [place]  |                 |
|                |              |  [id number]              |                 |
+----------------+--------------+---------------------+-----------------+
 
 
 
 
 Care Team Providers
 
 
+-----------------------+------+-------------+
| Care Team Member Name | Role | Phone       |
+-----------------------+------+-------------+
 PCP  | Unavailable |
+-----------------------+------+-------------+
 
 
 
 Encounter Details
 
 
+--------+-------------+----------------------+----------------------+-------------+
| Date   | Type        | Department           | Care Team            | Description |
+--------+-------------+----------------------+----------------------+-------------+
| / | Orders Only |   [facility]          |   Maricruz Flanagan, | Hypoxemia   |
|    |             | PULMONARY  [address]     |  RN                  |             |
|        |             | [address]  [place], |                      |             |
|        |             |  [place] [address]       |                      |             |
|        |             | [phone]         |                      |             |
+--------+-------------+----------------------+----------------------+-------------+
 
 
 
 Social History
 
 
+--------------+-------+-----------+--------+------+
| Tobacco Use  | Types | Packs/Day | Years  | Date |
|              |       |           | Used   |      |
+--------------+-------+-----------+--------+------+
| Never Smoker |       |           |        |      |
+--------------+-------+-----------+--------+------+
 
 
 
+---------------------+---+---+---+
| Smokeless Tobacco:  |   |   |   |
| Never Used          |   |   |   |
+---------------------+---+---+---+
 
 
 
+---------------------------------------------------------------+
| Comments: some second hand smoke exposure, but fairly minimal |
+---------------------------------------------------------------+
 
 
 
+-------------+-------------+---------+----------+
| Alcohol Use | Drinks/Week | oz/Week | Comments |
+-------------+-------------+---------+----------+
| No          |             |         |          |
+-------------+-------------+---------+----------+
 
 
 
 
+------------------+---------------+
| Sex Assigned at  | Date Recorded |
| Birth            |               |
+------------------+---------------+
| Not on file      |               |
+------------------+---------------+
 
 
 
+----------------+-------------+-------------+
| Job Start Date | Occupation  | Industry    |
+----------------+-------------+-------------+
| Not on file    | Not on file | Not on file |
+----------------+-------------+-------------+
 
 
 
+----------------+--------------+------------+
| Travel History | Travel Start | Travel End |
+----------------+--------------+------------+
 
 
 
+-------------------------------------+
| No recent travel history available. |
+-------------------------------------+
 documented as of this encounter
 
 Plan of Treatment
 
 
+--------+-----------+------------+----------------------+-------------------+
| Date   | Type      | Specialty  | Care Team            | Description       |
+--------+-----------+------------+----------------------+-------------------+
| / | Office    | Cardiology |   Tonia Wilson,    |                   |
|    | Visit     |            | [address]   |                   |
|        |           |            | St  [place], [place]  |                   |
|        |           |            | [address]  [phone]  |                   |
|        |           |            |  [phone] (Fax)  |                   |
+--------+-----------+------------+----------------------+-------------------+
| / | Hospital  | Radiology  |   Popeye Townsend, |                   |
|    | Encounter |            |  MD  [address] |                   |
|        |           |            |  St.  [place],   |                   |
|        |           |            | [place] [address]             |                   |
|        |           |            | [phone]         |                   |
|        |           |            | [phone] (Fax)   |                   |
+--------+-----------+------------+----------------------+-------------------+
| / | Surgery   | Radiology  |   Popeye Townsend, | CV EP PPM SYSTEM  |
| 2020   |           |            |  MD  [address] | IMPLANT           |
|        |           |            |  St.  [place],   |                   |
|        |           |            | [place] [address]             |                   |
|        |           |            | [phone]         |                   |
|        |           |            | [phone] (Fax)   |                   |
+--------+-----------+------------+----------------------+-------------------+
| 02/10/ | Clinical  | Cardiology |                      |                   |
|    | Support   |            |                      |                   |
+--------+-----------+------------+----------------------+-------------------+
| / | Office    | Cardiology |   Tonia Wilson,    |                   |
|    | Visit     |            | ARNJESSICA  [address]   |                   |
|        |           |            | St  [place], [place]  |                   |
 
|        |           |            | [address]  [phone]  |                   |
|        |           |            |  [phone] (Fax)  |                   |
+--------+-----------+------------+----------------------+-------------------+
| / | Off-Site  | Nephrology |   Marzena Moser  |                   |
|    | Visit     |            | DO ETIENNE  [address] [place] [address]      |                   |
|        |           |            | [address]      |                   |
|        |           |            | [place]      |                   |
|        |           |            | [address]  [phone]  |                   |
|        |           |            |  [phone] (Fax)  |                   |
+--------+-----------+------------+----------------------+-------------------+
 documented as of this encounter
 
 Visit Diagnoses
 
 
+-------------+
| Diagnosis   |
+-------------+
|   Hypoxemia |
+-------------+
 documented in this encounter

## 2020-01-08 NOTE — XMS
Encounter Summary
  Created on: 2020
 
 Viviana Ricci
 External Reference #: [id number]
 : 47
 Sex: Female
 
 Demographics
 
 
+-----------------------+----------------------+
| Address               | [address]      |
|                       | [place]  [address] |
+-----------------------+----------------------+
| Home Phone            | [phone]      |
+-----------------------+----------------------+
| Preferred Language    | Unknown              |
+-----------------------+----------------------+
| Marital Status        | Single               |
+-----------------------+----------------------+
| Jew Affiliation | [id number]                 |
+-----------------------+----------------------+
| Race                  | Unknown              |
+-----------------------+----------------------+
| Ethnic Group          | Unknown              |
+-----------------------+----------------------+
 
 
 Author
 
 
+--------------+--------------------------------------------+
| Author       | [facility]  |
|              | and [place]                                |
+--------------+--------------------------------------------+
| Organization | [facility]  |
|              | and [facility]                                |
+--------------+--------------------------------------------+
| Address      | Unknown                                    |
+--------------+--------------------------------------------+
| Phone        | Unavailable                                |
+--------------+--------------------------------------------+
 
 
 
 Support
 
 
+----------------+--------------+---------------------+-----------------+
| Name           | Relationship | Address             | Phone           |
+----------------+--------------+---------------------+-----------------+
| Ada/Ed Radhames | ECON         | [address]              | [phone] |
|                |              | [place]  |                 |
|                |              |  [id number]              |                 |
+----------------+--------------+---------------------+-----------------+
 
 
 
 
 Care Team Providers
 
 
+-----------------------+------+-------------+
| Care Team Member Name | Role | Phone       |
+-----------------------+------+-------------+
 PCP  | Unavailable |
+-----------------------+------+-------------+
 
 
 
 Reason for Visit
 Evaluate & Treat (Routine)
 
+--------+--------+------------+--------------+--------------+---------------+
| Status | Reason | Specialty  | Diagnoses /  | Referred By  | Referred To   |
|        |        |            | Procedures   | Contact      | Contact       |
+--------+--------+------------+--------------+--------------+---------------+
| Closed |        | Nephrology |   Diagnoses  |   Stroemel,  |   Stroemel,   |
|        |        |            |  Unspecified | Marzena CLIFTON,   | Marzena CLIFTON DO |
|        |        |            |              | DO  [address]   [address]    |
|        |        |            | complication |  [address] | [address]   |
|        |        |            |  of kidney   |  [address]  [place]  | [address]  [place]    |
|        |        |            | transplant   | [place]    | [place]     |
|        |        |            | FSGS (focal  | [address]        | [address]  Phone: |
|        |        |            | segmental    | Phone:       |  [phone] |
|        |        |            | glomeruloscl | [phone] |   Fax:        |
|        |        |            | erosis)      |   Fax:       | [phone]  |
|        |        |            | Hypertension | [phone] |               |
|        |        |            | , essential  |              |               |
|        |        |            |  Procedures  |              |               |
|        |        |            |  MA OFFICE   |              |               |
|        |        |            | OUTPATIENT   |              |               |
|        |        |            | VISIT 25     |              |               |
|        |        |            | MINUTES      |              |               |
+--------+--------+------------+--------------+--------------+---------------+
 
 
 
 
 Encounter Details
 
 
+--------+-----------+---------------------+----------------------+----------------------+
| Date   | Type      | Department          | Care Team            | Description          |
+--------+-----------+---------------------+----------------------+----------------------+
| / | Off-Site  |   [facility]         |   Marzena Moser  | Hypertension,        |
| 2017   | Visit     | NEPHROLOGY  [address]   | M, DO  [address]      | essential (Primary   |
|        |           | [address]   | [address]      | Dx); Kidney replaced |
|        |           | [place], [place]     | [place]      |  by transplant; Type |
|        |           | [address]          | [address]  [phone]  |  2 DM with CKD stage |
|        |           | [phone]        |  [phone] (Fax)  |  2 and hypertension  |
|        |           |                     |                      | (HCC); Coronary      |
|        |           |                     |                      | artery disease       |
|        |           |                     |                      | involving native     |
|        |           |                     |                      | coronary artery of   |
|        |           |                     |                      | native heart without |
|        |           |                     |                      |  angina pectoris     |
+--------+-----------+---------------------+----------------------+----------------------+
 
 
 
 
 Social History
 
 
+--------------+-------+-----------+--------+------+
| Tobacco Use  | Types | Packs/Day | Years  | Date |
|              |       |           | Used   |      |
+--------------+-------+-----------+--------+------+
| Never Smoker |       |           |        |      |
+--------------+-------+-----------+--------+------+
 
 
 
+---------------------+---+---+---+
| Smokeless Tobacco:  |   |   |   |
| Never Used          |   |   |   |
+---------------------+---+---+---+
 
 
 
+---------------------------------------------------------------+
| Comments: some second hand smoke exposure, but fairly minimal |
+---------------------------------------------------------------+
 
 
 
+-------------+-------------+---------+----------+
| Alcohol Use | Drinks/Week | oz/Week | Comments |
+-------------+-------------+---------+----------+
| No          |             |         |          |
+-------------+-------------+---------+----------+
 
 
 
+------------------+---------------+
| Sex Assigned at  | Date Recorded |
| Birth            |               |
+------------------+---------------+
| Not on file      |               |
+------------------+---------------+
 
 
 
+----------------+-------------+-------------+
| Job Start Date | Occupation  | Industry    |
+----------------+-------------+-------------+
| Not on file    | Not on file | Not on file |
+----------------+-------------+-------------+
 
 
 
+----------------+--------------+------------+
| Travel History | Travel Start | Travel End |
+----------------+--------------+------------+
 
 
 
+-------------------------------------+
 
| No recent travel history available. |
+-------------------------------------+
 documented as of this encounter
 
 Last Filed Vital Signs
 
 
+-------------------+----------------------+----------------------+----------+
| Vital Sign        | Reading              | Time Taken           | Comments |
+-------------------+----------------------+----------------------+----------+
| Blood Pressure    | 132/80               | 2017  2:07 PM  |          |
|                   |                      | PST                  |          |
+-------------------+----------------------+----------------------+----------+
| Pulse             | -                    | -                    |          |
+-------------------+----------------------+----------------------+----------+
| Temperature       | 35.6   C (96   F)    | 2017  2:07 PM  |          |
|                   |                      | PST                  |          |
+-------------------+----------------------+----------------------+----------+
| Respiratory Rate  | -                    | -                    |          |
+-------------------+----------------------+----------------------+----------+
| Oxygen Saturation | -                    | -                    |          |
+-------------------+----------------------+----------------------+----------+
| Inhaled Oxygen    | -                    | -                    |          |
| Concentration     |                      |                      |          |
+-------------------+----------------------+----------------------+----------+
| Weight            | 123.6 kg (272 lb 7.8 | 2017  2:07 PM  |          |
|                   |  oz)                 | PST                  |          |
+-------------------+----------------------+----------------------+----------+
| Height            | -                    | -                    |          |
+-------------------+----------------------+----------------------+----------+
| Body Mass Index   | 43.98                | 2015  3:02 PM  |          |
|                   |                      | PST                  |          |
+-------------------+----------------------+----------------------+----------+
 documented in this encounter
 
 Progress Notes
 Marzena Moser DO - 2017  2:12 PM PSTFormatting of this note might be different
 from the original.
 Subjective:  NEPHROLOGY 
  
 Patient ID: Viviana Ricci is a 71 y.o. female.
 
 HPI Comments: 
 Followup for this 72 YO  white female s/p renal allograft, 05, with remote  allograft 
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
demia, SHPTH,  previous low back pain, obesity/JACK, chronic pulmonary  hypertension, histori
izabela related to centripetal obesity, and  CAD, s/p CABG x3 vessels,. 
 
 Viviana appears very consistent in her med management, lab, and routine follow up of her allog
raft.  She denies any increased edema, ROSADO, graft tenderness, or fever.
 
 MEDS:
 
 Prograf 1.5 mg, BID
 Mycophenolate 250 mg, BID.
 Prednisone 5 mg, daily.
 Outpatient Prescriptions Marked as Taking for the 17 encounter (Off-Site Visit) with Maye Moser, DO 
 Medication Sig Dispense Refill 
   allopurinol (ZYLOPRIM) 100 mg tablet Take 1 tablet by mouth Daily. 30 tablet 11 
 
   cholecalciferol (VITAMIN D-3) 2000 UNITS TABS Take 5,000 Units by mouth Every other day
.   
   cinacalcet (SENSIPAR) 30 mg tablet Take 1 tablet by mouth Daily. 30 tablet 11 
   fludrocortisone (FLORINEF) 0.1 mg tablet Take 1 tablet by mouth Every other day. 90 tab
let 4 
   [DISCONTINUED] fludrocortisone (FLORINEF) 0.1 mg tablet Take 1 tablet by mouth Every ot
her day. 45 tablet 3 
   furosemide (LASIX) 40 mg tablet Take 1 tablet by mouth Daily as needed. 30 tablet 5 
   glucose blood VI test strips (ONE TOUCH ULTRA TEST) strip Check blood sugar before each
 meal and as directed 100 each 12 
   insulin glargine (LANTUS) 100 units/mL injection (vial) Inject 20 Units under the skin 
every morning. 10 mL 11 
   insulin lispro (HUMALOG) 100 units/mL injection (vial) Inject subcutaneously before sara
ls according to sliding scale 10 vial 11 
   Insulin Syringe-Needle U-100 (BD INSULIN SYRINGE ULTRAFINE) 31G X 5/16" 0.5 ML MISC Use
 before meals and as directed. 100 each 11 
   levothyroxine (LEVOTHROID) 50 mcg tablet Take 1 tablet by mouth Daily. 30 tablet 11 
   lisinopril (PRINIVIL,ZESTRIL) 30 MG tablet Take 1 tablet by mouth Daily. 90 tablet 3 
   loperamide (ANTI-DIARRHEAL) 2 mg capsule Take 1 capsule by mouth 4 times daily as neede
d. 60 capsule 5 
   losartan (COZAAR) 50 mg tablet Take 1 tablet by mouth Daily. 90 tablet 3 
   magnesium oxide (MAG-OX) 400 mg tablet Take 1 tablet by mouth 2 times daily. 60 tablet 
11 
   metoprolol tartrate (LOPRESSOR) 25 mg tablet Take 1 tablet by mouth 2 times daily. 60 t
ablet 11 
   omeprazole (PRILOSEC) 20 mg capsule Take one capsule by mouth once daily on an empty st
omach 90 capsule 4 
   Prenatal Multivit-Min-Fe-FA (PRENATAL VITAMINS) 0.8 MG TABS Take 0.8 mg by mouth Daily.
 30 each 11 
   Respiratory Therapy Supplies MISC Decrease CPAP to 12-18 cmH2O Diagnosis Code(s)327.23.
 Please send order to [facility]. 1 each 0 
   rosuvastatin (CRESTOR) 20 mg tablet Take 1 tablet by mouth nightly. 30 tablet 11 
 
 No Known Allergies
 
 Objective:   Blood pressure 132/80, temperature 35.6 C (96 F), weight 123.6 kg (272 lb 
7.8 oz).
  
  
 
 Physical Exam
 
 HEENT:  no thrush.
 Heart:  Regular rate and rhythm with no S3, S4, murmur or rub.
 Lungs:  CTA  in all fields.
 Abdomen:  soft, obese, renal allograft in RLQ is nontender, normoactive bowel sounds.
 Extremities:  trace edema, no clubbing, cyanosis,  no foot ulcers.
 
 Lab Results 
 Component Value Date 
  NAEX 142 2017 
  KEX 5.1 2017 
  CLEX 110 2017 
  CO2EX 19 2017 
  BUNEX 42* 2017 
  CREEX 1.4* 2017 
  EGFREX 37 2017 
  GLUEX 129* 2017 
  PHOSEX 2.9 2017 
  MGEX 1.9 2017 
 
  PTHEX 193.0* 2016 
  FMI7OFS 7.6 2017 
 
 Lab Results 
 Component Value Date 
  CHOLEX 143 2017 
  HDLEX 50.2 2017 
  LDLEX 55 2017 
  TRIGEX 190* 2017 
 
 Lab Results 
 Component Value Date 
  WBCEX 5.5 2017 
  HGBEX 13.8 2017 
  HCTEX 43.3 2017 
  PLTEX 169 2017 
 
 Lab Results 
 Component Value Date 
  TACROLIMUSEX 6.3 2017 
 
 Lab Results 
 Component Value Date 
  UAEX Negative 2017 
  UAEX normal 2017 
  UAEX negative 2017 
  UAEX 5 2017 
  UAEX normal 2017 
  UAEX 0 2017 
  UAEX 1.012 2017 
  UAEX 100 2017 
 
 Assessment: 
 
 1.  Renal Allograft, 05 -- her allograft fucntion is stable.
 2.  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
 3.  Type 2 DM, requiring insuline-- good control.
 4.  Hyperlipidemia--on statin Rx
 5.  Hypertension-- very good control.
 6.  SHPTH--stable.  
 7.  Obesity/JACK/Pulmonary hypertension-- compensated.
 8.  CAD, s/p CABG, --stable on ASA, metoprolol, atorvastatin.
 9.  Type IV RTA--stable.
 10. Chronic Low Back pain--in remission.
 11.   DJD, left knee--about same.
 12.  s/p DVT left leg, 2015-- stable off of apixaban.
 
 Plan: 
 
 1.  I reviewed with Viviana that her Scr and tacro. levels appear stable.
 2.  Overall, she appears to be following a low salt diet, and doing as well as possible wit
h her diabetic control.
 3.  She will continue to do her Standing Order every 4 mo.
 4.  Will plan to see her back at the CKD Clinic at [facility], in 6 mo. with her Standing Order
 1 week before.
 
 Electronically signed by: Marzena Moser, 2017 14:12 
 
 CC:    Jose David Dalal M.D., [facility], [facility]
            Edgar Sanders MD, [facility], Orthopedics
 
            ONOFRE WHITLOCK M.D., F.A.C.S
 
  Electronically signed by Marzena Moser DO at 02/10/2017  6:07 PM PSTdocumented in th
is encounter
 
 Plan of Treatment
 
 
+--------+-----------+------------+----------------------+-------------------+
| Date   | Type      | Specialty  | Care Team            | Description       |
+--------+-----------+------------+----------------------+-------------------+
| / | Office    | Cardiology |   Tonia Wilson,    |                   |
|    | Visit     |            | [facility]  [address]   |                   |
|        |           |            | [place], [place]  |                   |
|        |           |            | [address]  [phone]  |                   |
|        |           |            |  [phone] (Fax)  |                   |
+--------+-----------+------------+----------------------+-------------------+
| / | Hospital  | Radiology  |   Popeye Townsend, |                   |
|    | Encounter |            |  MD  [address] |                   |
|        |           |            |  [place],   |                   |
|        |           |            | [place] [address]             |                   |
|        |           |            | [phone]         |                   |
|        |           |            | [phone] (Fax)   |                   |
+--------+-----------+------------+----------------------+-------------------+
| / | Surgery   | Radiology  |   Popeye Townsend, | CV EP PPM SYSTEM  |
|    |           |            |  MD  [address] | IMPLANT           |
|        |           |            |  [place],   |                   |
|        |           |            | [place] [address]             |                   |
|        |           |            | [phone]         |                   |
|        |           |            | [phone] (Fax)   |                   |
+--------+-----------+------------+----------------------+-------------------+
| 02/10/ | Clinical  | Cardiology |                      |                   |
|    | Support   |            |                      |                   |
+--------+-----------+------------+----------------------+-------------------+
| / | Office    | Cardiology |   Tonia Wilson,    |                   |
|    | Visit     |            | [facility]  [address]   |                   |
|        |           |            | [place]  |                   |
|        |           |            | [address]  [phone]  |                   |
|        |           |            |  [phone] (Fax)  |                   |
+--------+-----------+------------+----------------------+-------------------+
| / | Off-Site  | Nephrology |   Marzena Moser  |                   |
|    | Visit     |            | DO [facility]  [address] [place] [address]      |                   |
|        |           |            | [address]      |                   |
|        |           |            | [place]      |                   |
|        |           |            | [address]  [phone]  |                   |
|        |           |            |  [phone] (Fax)  |                   |
+--------+-----------+------------+----------------------+-------------------+
 documented as of this encounter
 
 Procedures
 
 
+----------------------+--------+-------------+----------------------+----------------------
+
| Procedure Name       | Priori | Date/Time   | Associated Diagnosis | Comments             
|
|                      | ty     |             |                      |                      
|
+----------------------+--------+-------------+----------------------+----------------------
+
 
| LABS - EXTERNAL SCAN |        | 2018  |                      |   Results for this   
|
|                      |        | 12:00 AM    |                      | procedure are in the 
|
|                      |        | PDT         |                      |  results section.    
|
+----------------------+--------+-------------+----------------------+----------------------
+
| LABS - EXTERNAL SCAN |        | 2017  |                      |   Results for this   
|
|                      |        | 12:00 AM    |                      | procedure are in the 
|
|                      |        | PDT         |                      |  results section.    
|
+----------------------+--------+-------------+----------------------+----------------------
+
| LABS - EXTERNAL SCAN |        | 2017  |   Kidney replaced by |   Results for this   
|
|                      |        | 12:00 AM    |  transplant          | procedure are in the 
|
|                      |        | PST         | Hypertension,        |  results section.    
|
|                      |        |             | essential  Type 2 DM |                      
|
|                      |        |             |  with CKD stage 2    |                      
|
|                      |        |             | and hypertension     |                      
|
|                      |        |             | (HCC)  Coronary      |                      
|
|                      |        |             | artery disease       |                      
|
|                      |        |             | involving native     |                      
|
|                      |        |             | coronary artery of   |                      
|
|                      |        |             | native heart without |                      
|
|                      |        |             |  angina pectoris     |                      
|
+----------------------+--------+-------------+----------------------+----------------------
+
| LABS - EXTERNAL SCAN |        | 2017  |   Kidney replaced by |   Results for this   
|
|                      |        | 12:00 AM    |  transplant          | procedure are in the 
|
|                      |        | PST         | Hypertension,        |  results section.    
|
|                      |        |             | essential  Type 2 DM |                      
|
|                      |        |             |  with CKD stage 2    |                      
|
|                      |        |             | and hypertension     |                      
|
|                      |        |             | ([facility])  Coronary      |                      
|
|                      |        |             | artery disease       |                      
|
|                      |        |             | involving native     |                      
|
 
|                      |        |             | coronary artery of   |                      
|
|                      |        |             | native heart without |                      
|
|                      |        |             |  angina pectoris     |                      
|
+----------------------+--------+-------------+----------------------+----------------------
+
 documented in this encounter
 
 Results
 LABS - EXTERNAL SCAN (2018 12:00 AM PDT)
 
+------------------------------------------------------------------------+--------------+
| Narrative                                                              | Performed At |
+------------------------------------------------------------------------+--------------+
|   This result has an attachment that is not available.  Ordered by an  |              |
| unspecified provider.                                                  |              |
+------------------------------------------------------------------------+--------------+
 LABS - EXTERNAL SCAN (2017 12:00 AM PDT)
 
+------------------------------------------------------------------------+--------------+
| Narrative                                                              | Performed At |
+------------------------------------------------------------------------+--------------+
|   This result has an attachment that is not available.  Ordered by an  |              |
| unspecified provider.                                                  |              |
+------------------------------------------------------------------------+--------------+
 LABS - EXTERNAL SCAN (2017 12:00 AM PST)
 
+------------------------------------------------------------------------+--------------+
| Narrative                                                              | Performed At |
+------------------------------------------------------------------------+--------------+
|   This result has an attachment that is not available.  Ordered by an  |              |
| unspecified provider.                                                  |              |
+------------------------------------------------------------------------+--------------+
 LABS - EXTERNAL SCAN (2017 12:00 AM PST)
 
+------------------------------------------------------------------------+--------------+
| Narrative                                                              | Performed At |
+------------------------------------------------------------------------+--------------+
|   This result has an attachment that is not available.  Ordered by an  |              |
| unspecified provider.                                                  |              |
+------------------------------------------------------------------------+--------------+
 documented in this encounter
 
 Visit Diagnoses
 
 
+-------------------------------------------------------------------------------------+
| Diagnosis                                                                           |
+-------------------------------------------------------------------------------------+
|   Hypertension, essential - Primary  Unspecified essential hypertension             |
+-------------------------------------------------------------------------------------+
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
+-------------------------------------------------------------------------------------+
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
+-------------------------------------------------------------------------------------+
|   Coronary artery disease involving native coronary artery of native heart without  |
| angina pectoris                                                                     |
+-------------------------------------------------------------------------------------+
 
 documented in this encounter

## 2020-01-08 NOTE — XMS
Encounter Summary
  Created on: 2020
 
 Viviana Ricci
 External Reference #: [id number]
 : 47
 Sex: Female
 
 Demographics
 
 
+-----------------------+----------------------+
| Address               | [address]      |
|                       | [place]  [address] |
+-----------------------+----------------------+
| Home Phone            | [phone]      |
+-----------------------+----------------------+
| Preferred Language    | Unknown              |
+-----------------------+----------------------+
| Marital Status        | Single               |
+-----------------------+----------------------+
| Confucianist Affiliation | [id number]                 |
+-----------------------+----------------------+
| Race                  | Unknown              |
+-----------------------+----------------------+
| Ethnic Group          | Unknown              |
+-----------------------+----------------------+
 
 
 Author
 
 
+--------------+--------------------------------------------+
| Author       | [facility]  |
|              | and [facility]                                |
+--------------+--------------------------------------------+
| Organization | [facility]  |
|              | and [facility]                                |
+--------------+--------------------------------------------+
| Address      | Unknown                                    |
+--------------+--------------------------------------------+
| Phone        | Unavailable                                |
+--------------+--------------------------------------------+
 
 
 
 Support
 
 
+----------------+--------------+---------------------+-----------------+
| Name           | Relationship | Address             | Phone           |
+----------------+--------------+---------------------+-----------------+
| Ada/Ed Radhames | [facility]         | [address]              | [phone] |
|                |              | [place]  |                 |
|                |              |  [id number]              |                 |
+----------------+--------------+---------------------+-----------------+
 
 
 
 
 Care Team Providers
 
 
+------------------------+------+-----------------+
| Care Team Member Name  | Role | Phone           |
+------------------------+------+-----------------+
| Marzena Moser PCP  | [phone] |
+------------------------+------+-----------------+
 
 
 
 Reason for Referral
 Diagnostic/Screening (Routine)
 
+--------+--------+-----------+--------------+--------------+---------------+
| Status | Reason | Specialty | Diagnoses /  | Referred By  | Referred To   |
|        |        |           | Procedures   | Contact      | Contact       |
+--------+--------+-----------+--------------+--------------+---------------+
| Closed |        | Radiology |   Diagnoses  |   [place],  |   [facility]    |
|        |        |           |  Coronary    | Tonia, ARNP   | [address]  |
|        |        |           | artery       | [address] |  [place], |
|        |        |           | disease      |  [place]  [place]   |  [place]           |
|        |        |           | involving    | [place]    | [address]    |
|        |        |           | native       | [address]        | Phone:        |
|        |        |           | coronary     | Phone:       | [phone]  |
|        |        |           | artery of    | [phone] |  Fax:         |
|        |        |           | native heart |   Fax:       | [phone]  |
|        |        |           |  without     | [phone] |               |
|        |        |           | angina       |              |               |
|        |        |           | pectoris     |              |               |
|        |        |           | Hypertension |              |               |
|        |        |           | , essential  |              |               |
|        |        |           |  Mixed       |              |               |
|        |        |           | hyperlipidem |              |               |
|        |        |           | ia  PVC      |              |               |
|        |        |           | (premature   |              |               |
|        |        |           | ventricular  |              |               |
|        |        |           | contraction) |              |               |
|        |        |           |   Procedures |              |               |
|        |        |           |   ECHO       |              |               |
|        |        |           | Complete     |              |               |
+--------+--------+-----------+--------------+--------------+---------------+
 
 
 
 
 Reason for Visit
 Diagnostic/Screening (Routine)
 
+--------+--------+-----------+--------------+--------------+---------------+
| Status | Reason | Specialty | Diagnoses /  | Referred By  | Referred To   |
|        |        |           | Procedures   | Contact      | Contact       |
+--------+--------+-----------+--------------+--------------+---------------+
| Closed |        | Radiology |   Diagnoses  |   [place],  |   [facility]    |
|        |        |           |  Coronary    | Tonia, ARNP   | [address]  |
|        |        |           | artery       | [address] |  [place], |
|        |        |           | disease      |  St  [place]   |  [place]           |
|        |        |           | involving    | [place]    | [address]    |
|        |        |           | native       | [address]        | Phone:        |
 
|        |        |           | coronary     | Phone:       | [phone]  |
|        |        |           | artery of    | [phone] |  Fax:         |
|        |        |           | native heart |   Fax:       | [phone]  |
|        |        |           |  without     | [phone] |               |
|        |        |           | angina       |              |               |
|        |        |           | pectoris     |              |               |
|        |        |           | Hypertension |              |               |
|        |        |           | , essential  |              |               |
|        |        |           |  Mixed       |              |               |
|        |        |           | hyperlipidem |              |               |
|        |        |           | ia  PVC      |              |               |
|        |        |           | (premature   |              |               |
|        |        |           | ventricular  |              |               |
|        |        |           | contraction) |              |               |
|        |        |           |   Procedures |              |               |
|        |        |           |   ECHO       |              |               |
|        |        |           | Complete     |              |               |
+--------+--------+-----------+--------------+--------------+---------------+
 
 
 
 
 Encounter Details
 
 
+--------+-----------+----------------------+----------------------+----------------------+
| Date   | Type      | Department           | Care Team            | Description          |
+--------+-----------+----------------------+----------------------+----------------------+
| 10/30/ | Hospital  |   [facility] |   Tonia Wilson,    | Coronary artery      |
| 2019   | Encounter |  [facility]  [address] ARNP  [address]   | disease involving    |
|        |           |  [address]  [place]       | St  [place]  | native coronary      |
|        |           | [place] [address] | [address]  [phone]  | artery of native     |
|        |           |   [phone]       |  [phone] (Fax)  | heart without angina |
|        |           |                      |                      |  pectoris;           |
|        |           |                      |                      | Hypertension,        |
|        |           |                      |                      | essential; Mixed     |
|        |           |                      |                      | hyperlipidemia; PVC  |
|        |           |                      |                      | (premature           |
|        |           |                      |                      | ventricular          |
|        |           |                      |                      | contraction)         |
+--------+-----------+----------------------+----------------------+----------------------+
 
 
 
 Social History
 
 
+--------------+-------+-----------+--------+------+
| Tobacco Use  | Types | Packs/Day | Years  | Date |
|              |       |           | Used   |      |
+--------------+-------+-----------+--------+------+
| Never Smoker |       |           |        |      |
+--------------+-------+-----------+--------+------+
 
 
 
+---------------------+---+---+---+
| Smokeless Tobacco:  |   |   |   |
| Never Used          |   |   |   |
+---------------------+---+---+---+
 
 
 
 
+---------------------------------------------------------------+
| Comments: some second hand smoke exposure, but fairly minimal |
+---------------------------------------------------------------+
 
 
 
+-------------+-------------+---------+----------+
| Alcohol Use | Drinks/Week | oz/Week | Comments |
+-------------+-------------+---------+----------+
| No          |             |         |          |
+-------------+-------------+---------+----------+
 
 
 
+------------------+---------------+
| Sex Assigned at  | Date Recorded |
| Birth            |               |
+------------------+---------------+
| Not on file      |               |
+------------------+---------------+
 
 
 
+----------------+-------------+-------------+
| Job Start Date | Occupation  | Industry    |
+----------------+-------------+-------------+
| Not on file    | Not on file | Not on file |
+----------------+-------------+-------------+
 
 
 
+----------------+--------------+------------+
| Travel History | Travel Start | Travel End |
+----------------+--------------+------------+
 
 
 
+-------------------------------------+
| No recent travel history available. |
+-------------------------------------+
 documented as of this encounter
 
 Medications at Time of Discharge
 
 
+----------------------+----------------------+-----------+---------+----------+-----------+
| Medication           | Sig                  | Dispensed | Refills | Start    | End Date  |
|                      |                      |           |         | Date     |           |
+----------------------+----------------------+-----------+---------+----------+-----------+
|   allopurinol        | take 1 tablet by     |   30      | 11      | 20 |           |
| (ZYLOPRIM) 100 mg    | mouth once daily     | tablet    |         | 18       |           |
| tablet               |                      |           |         |          |           |
+----------------------+----------------------+-----------+---------+----------+-----------+
|   aspirin 81 mg EC   | Take 81 mg by mouth  |           | 0       |          |           |
| tablet               | Daily.               |           |         |          |           |
+----------------------+----------------------+-----------+---------+----------+-----------+
|   B-D INS SYRINGE    | USE BEFORE MEALS AS  |   1 each  | 11      | 20 |           |
 
| 0.5CC/31GX5/16 31G X | DIRECTED             |           |         | 18       |           |
|  " 0.5 ML MISC   |                      |           |         |          |           |
+----------------------+----------------------+-----------+---------+----------+-----------+
|   cholecalciferol    | Take 2,000 Units by  |           | 0       |          |           |
| (VITAMIN D-3) 2000   | mouth Every other    |           |         |          |           |
| UNITS                | day.                 |           |         |          |           |
| TABSIndications:     |                      |           |         |          |           |
| Unspecified          |                      |           |         |          |           |
| hypertensive kidney  |                      |           |         |          |           |
| disease with chronic |                      |           |         |          |           |
|  kidney disease      |                      |           |         |          |           |
| stage I through      |                      |           |         |          |           |
| stage IV, or         |                      |           |         |          |           |
| unspecified(403.90), |                      |           |         |          |           |
|  FSGS (focal         |                      |           |         |          |           |
| segmental            |                      |           |         |          |           |
| glomerulosclerosis), |                      |           |         |          |           |
|  Hyperlipidemia,     |                      |           |         |          |           |
| Complications of     |                      |           |         |          |           |
| transplanted kidney  |                      |           |         |          |           |
+----------------------+----------------------+-----------+---------+----------+-----------+
|   cinacalcet         | take 1 tablet by     |   90      | 3       | 20 |           |
| (SENSIPAR) 30 mg     | mouth once daily     | tablet    |         | 19       |           |
| tablet               |                      |           |         |          |           |
+----------------------+----------------------+-----------+---------+----------+-----------+
|   cyclobenzaprine    | Take 1 tablet by     |   45      | 0       | 20 |           |
| (FLEXERIL) 10 mg     | mouth Twice  daily   | tablet    |         | 19       |           |
| tablet               | as needed for Muscle |           |         |          |           |
|                      |  spasms.             |           |         |          |           |
+----------------------+----------------------+-----------+---------+----------+-----------+
|   fludrocortisone    | Take 1 tablet by     |   45      | 3       | 20 |           |
| (FLORINEF) 0.1 mg    | mouth Every other    | tablet    |         | 19       |           |
| tablet               | day.                 |           |         |          |           |
+----------------------+----------------------+-----------+---------+----------+-----------+
|   furosemide (LASIX) | Take 1 tablet by     |   30      | 11      | 20 |           |
|  40 mg               | mouth Daily as       | tablet    |         | 18       |           |
| tabletIndications:   | needed.              |           |         |          |           |
| Edema, unspecified   |                      |           |         |          |           |
| type, FSGS (focal    |                      |           |         |          |           |
| segmental            |                      |           |         |          |           |
| glomerulosclerosis), |                      |           |         |          |           |
|  Hyperlipidemia      |                      |           |         |          |           |
+----------------------+----------------------+-----------+---------+----------+-----------+
|   glucose blood VI   | Check blood sugar    |   100     | 12      | 20 |           |
| test strips (ONE     | before each meal and | each      |         | 12       |           |
| TOUCH ULTRA TEST)    |  as directed         |           |         |          |           |
| stripIndications:    |                      |           |         |          |           |
| Unspecified          |                      |           |         |          |           |
| hypertensive kidney  |                      |           |         |          |           |
| disease with chronic |                      |           |         |          |           |
|  kidney disease      |                      |           |         |          |           |
| stage I through      |                      |           |         |          |           |
| stage IV, or         |                      |           |         |          |           |
| unspecified(403.90), |                      |           |         |          |           |
|  Complications of    |                      |           |         |          |           |
| transplanted kidney, |                      |           |         |          |           |
|  Diabetes mellitus   |                      |           |         |          |           |
| type II,             |                      |           |         |          |           |
| uncontrolled (HCC),  |                      |           |         |          |           |
| Hyperlipidemia       |                      |           |         |          |           |
 
+----------------------+----------------------+-----------+---------+----------+-----------+
|   insulin glargine   | inject 20 units      |   10 vial | 11      | 20 |           |
| (LANTUS) 100         | subcutaneously every |           |         | 18       |           |
| units/mL injection   |  morning             |           |         |          |           |
| (vial)Indications:   |                      |           |         |          |           |
| Type 2 diabetes      |                      |           |         |          |           |
| mellitus with        |                      |           |         |          |           |
| chronic kidney       |                      |           |         |          |           |
| disease, without     |                      |           |         |          |           |
| long-term current    |                      |           |         |          |           |
| use of insulin,      |                      |           |         |          |           |
| unspecified CKD      |                      |           |         |          |           |
| stage (HCC), Kidney  |                      |           |         |          |           |
| replaced by          |                      |           |         |          |           |
| transplant           |                      |           |         |          |           |
+----------------------+----------------------+-----------+---------+----------+-----------+
|   insulin lispro     | inject               |   10 vial | 11      | 11/15/20 |           |
| (HUMALOG) 100        | subcutaneously       |           |         | 17       |           |
| units/mL injection   | BEFORE MEALS         |           |         |          |           |
| (vial)Indications:   | ACCORDING TO SLIDING |           |         |          |           |
| Type 2 diabetes      |  SCALE Max dose 20   |           |         |          |           |
| mellitus with        | units daily          |           |         |          |           |
| complication, with   |                      |           |         |          |           |
| long-term current    |                      |           |         |          |           |
| use of insulin (HCC) |                      |           |         |          |           |
+----------------------+----------------------+-----------+---------+----------+-----------+
|   levothyroxine      | take 1 tablet by     |   30      | 11      | 20 |           |
| (SYNTHROID) 50 mcg   | mouth once daily     | tablet    |         | 18       |           |
| tablet               |                      |           |         |          |           |
+----------------------+----------------------+-----------+---------+----------+-----------+
|   lisinopril         | take 1 tablet by     |   90      | 3       | 20 |           |
| (PRINIVIL,ZESTRIL)   | mouth once daily     | tablet    |         | 17       |           |
| 30 MG tablet         |                      |           |         |          |           |
+----------------------+----------------------+-----------+---------+----------+-----------+
|   loperamide         | Take 1 capsule by    |   60      | 5       | 20 |           |
| (ANTI-DIARRHEAL) 2   | mouth 4 times daily  | capsule   |         | 14       |           |
| mg                   | as needed.           |           |         |          |           |
| capsuleIndications:  |                      |           |         |          |           |
| Unspecified          |                      |           |         |          |           |
| hypertensive kidney  |                      |           |         |          |           |
| disease with chronic |                      |           |         |          |           |
|  kidney disease      |                      |           |         |          |           |
| stage I through      |                      |           |         |          |           |
| stage IV, or         |                      |           |         |          |           |
| unspecified(403.90), |                      |           |         |          |           |
|  FSGS (focal         |                      |           |         |          |           |
| segmental            |                      |           |         |          |           |
| glomerulosclerosis), |                      |           |         |          |           |
|  Hypothyroidism,     |                      |           |         |          |           |
| Hyperlipidemia       |                      |           |         |          |           |
+----------------------+----------------------+-----------+---------+----------+-----------+
|   losartan (COZAAR)  | take 1 tablet by     |   90      | 3       | 20 |           |
| 50 mg tablet         | mouth once daily     | tablet    |         | 18       |           |
+----------------------+----------------------+-----------+---------+----------+-----------+
|   magnesium oxide    | take 1 tablet by     |   60      | 11      | 10/02/20 |           |
| (MAG-OX) 400 mg      | mouth twice a day    | tablet    |         | 18       |           |
| tablet               |                      |           |         |          |           |
+----------------------+----------------------+-----------+---------+----------+-----------+
|   predniSONE         | take 1 tablet by     |   90      | 3       | 10/02/20 |           |
| (DELTASONE) 5 mg     | mouth once daily     | tablet    |         | 18       |           |
 
| tablet               |                      |           |         |          |           |
+----------------------+----------------------+-----------+---------+----------+-----------+
|   Prenatal Vit-Fe    | take 1 tablet by     |   30      | 11      | 20 |           |
| Fumarate-FA (PNV     | mouth once daily.    | tablet    |         | 18       |           |
| PRENATAL PLUS        |                      |           |         |          |           |
| MULTIVITAMIN) 27-1   |                      |           |         |          |           |
| MG TABS              |                      |           |         |          |           |
+----------------------+----------------------+-----------+---------+----------+-----------+
|   Respiratory        | Continue nocturnal   |   1 each  | 0       | 20 |           |
| Therapy Supplies     | O2 at 2 l/m through  |           |         | 18       |           |
| MISCIndications: JACK | CPAP for lifetime.   |           |         |          |           |
|  (obstructive sleep  | Dx: JACK G47.33       |           |         |          |           |
| apnea)               | Please provide new   |           |         |          |           |
|                      | nasal mask for CPAP  |           |         |          |           |
|                      | and any other needed |           |         |          |           |
|                      |  replacement         |           |         |          |           |
|                      | supplies.            |           |         |          |           |
+----------------------+----------------------+-----------+---------+----------+-----------+
|   Respiratory        | Decrease CPAP to     |   1 each  | 0       | 20 |           |
| Therapy Supplies     | 12-18 cmH2O          |           |         | 13       |           |
| MISC                 | Diagnosis            |           |         |          |           |
|                      | Code(s)327.23.       |           |         |          |           |
|                      | Please send order to |           |         |          |           |
|                      |  [facility].     |           |         |          |           |
+----------------------+----------------------+-----------+---------+----------+-----------+
|   rosuvastatin       | take 1 tablet by     |   30      | 11      | 20 |           |
| (CRESTOR) 20 mg      | mouth NIGHTLY        | tablet    |         | 18       |           |
| tablet               |                      |           |         |          |           |
+----------------------+----------------------+-----------+---------+----------+-----------+
|   fludrocortisone    | Take 1 tablet by     |           | 0       |          |  |
| (FLORINEF) 0.1 mg    | mouth Every other    |           |         |          | 9         |
| tablet               | day.                 |           |         |          |           |
+----------------------+----------------------+-----------+---------+----------+-----------+
|   metoprolol         | take 1 tablet by     |   60      | 11      | 20 |  |
| tartrate (LOPRESSOR) | mouth twice a day    | tablet    |         | 19       | 9         |
|  25 mg tablet        |                      |           |         |          |           |
+----------------------+----------------------+-----------+---------+----------+-----------+
|   mycophenolate      | Take 1 capsule by    |   60      | 11      | 20 |  |
| (CELLCEPT) 250 mg    | mouth 2 times daily. | capsule   |         | 18       | 9         |
| capsuleIndications:  |                      |           |         |          |           |
| Kidney replaced by   |                      |           |         |          |           |
| transplant           |                      |           |         |          |           |
+----------------------+----------------------+-----------+---------+----------+-----------+
|   QVAR REDIHALER 80  |                      |           | 0       | 20 |  |
| MCG/ACT inhaler      |                      |           |         | 18       | 9         |
+----------------------+----------------------+-----------+---------+----------+-----------+
|   tacrolimus         | Take 1 capsule by    |   240     | 0       | 20 |  |
| (PROGRAF) 1 mg       | mouth 2 times daily. | capsule   |         | 19       | 9         |
| capsuleIndications:  |  For a total dose of |           |         |          |           |
| Kidney replaced by   |  1 mg, by mouth, 2   |           |         |          |           |
| transplant           | times daily.         |           |         |          |           |
+----------------------+----------------------+-----------+---------+----------+-----------+
 documented as of this encounter
 
 Plan of Treatment
 
 
+--------+-----------+------------+----------------------+-------------------+
| Date   | Type      | Specialty  | Care Team            | Description       |
+--------+-----------+------------+----------------------+-------------------+
 
| / | Office    | Cardiology |   Tonia Wilson,    |                   |
|    | Visit     |            | ARNP  [address]   |                   |
|        |           |            |   [place], [place]  |                   |
|        |           |            | [id number] [phone]  |                   |
|        |           |            |  [phone] (Fax)  |                   |
+--------+-----------+------------+----------------------+-------------------+
| / | Hospital  | Radiology  |   Popeye Townsend, |                   |
|    | Encounter |            |  MD  [address] |                   |
|        |           |            |  St.  [place],   |                   |
|        |           |            | [place] [address]             |                   |
|        |           |            | [phone]         |                   |
|        |           |            | [phone] (Fax)   |                   |
+--------+-----------+------------+----------------------+-------------------+
| / | Surgery   | Radiology  |   Popeye Townsend, | CV EP PPM SYSTEM  |
|    |           |            |  MD  [address] | IMPLANT           |
|        |           |            |  [place],   |                   |
|        |           |            | [place] [address]             |                   |
|        |           |            | [phone]         |                   |
|        |           |            | [phone] (Fax)   |                   |
+--------+-----------+------------+----------------------+-------------------+
| 02/10/ | Clinical  | Cardiology |                      |                   |
|    | Support   |            |                      |                   |
+--------+-----------+------------+----------------------+-------------------+
| / | Office    | Cardiology |   Tonia Wilson,    |                   |
|    | Visit     |            | BELKIS  [address]   |                   |
|        |           |            | St  [place], [place]  |                   |
|        |           |            | [address]  [phone]  |                   |
|        |           |            |  [phone] (Fax)  |                   |
+--------+-----------+------------+----------------------+-------------------+
| / | Off-Site  | Nephrology |   Marzena Moser  |                   |
|    | Visit     |            | M, DO  [address]      |                   |
|        |           |            | [address]      |                   |
|        |           |            | [place]      |                   |
|        |           |            | [address]  [phone]  |                   |
|        |           |            |  [phone] (Fax)  |                   |
+--------+-----------+------------+----------------------+-------------------+
 documented as of this encounter
 
 Procedures
 
 
+----------------+--------+-------------+----------------------+----------------------+
| Procedure Name | Priori | Date/Time   | Associated Diagnosis | Comments             |
|                | ty     |             |                      |                      |
+----------------+--------+-------------+----------------------+----------------------+
| ECHO COMPLETE  | Routin | 10/30/2019  |   Coronary artery    |   Results for this   |
|                | e      |  5:07 PM    | disease involving    | procedure are in the |
|                |        | PDT         | native coronary      |  results section.    |
|                |        |             | artery of native     |                      |
|                |        |             | heart without angina |                      |
|                |        |             |  pectoris            |                      |
|                |        |             | Hypertension,        |                      |
|                |        |             | essential  Mixed     |                      |
|                |        |             | hyperlipidemia  PVC  |                      |
|                |        |             | (premature           |                      |
|                |        |             | ventricular          |                      |
|                |        |             | contraction)         |                      |
+----------------+--------+-------------+----------------------+----------------------+
 documented in this encounter
 
 
 Results
 ECHO Complete (10/30/2019  5:07 PM PDT)
 
+-------------+---------+-----------+-------------+--------------+
| Component   | Value   | Ref Range | Performed   | Pathologist  |
|             |         |           | At          | Signature    |
+-------------+---------+-----------+-------------+--------------+
| Patient     | 262 lbs |           | [facility] |              |
| Weight      |         |           |             |              |
| (lbs)       |         |           |             |              |
+-------------+---------+-----------+-------------+--------------+
| Patient     | 5'6     |           | [facility] |              |
| Height      |         |           |             |              |
+-------------+---------+-----------+-------------+--------------+
| LVIDd       | 4.13    | cm        | [facility] |              |
+-------------+---------+-----------+-------------+--------------+
| FS          | 28      | %         | [facility] |              |
+-------------+---------+-----------+-------------+--------------+
| LA volume   | 94.38   | mL        | [facility] |              |
+-------------+---------+-----------+-------------+--------------+
| Ascending   | 3.33    | cm        | [facility] |              |
| aorta       |         |           |             |              |
+-------------+---------+-----------+-------------+--------------+
| Aortic arch | 2.24    | cm        | [facility] |              |
+-------------+---------+-----------+-------------+--------------+
| IVRT        | 107.27  | msec      | [facility] |              |
+-------------+---------+-----------+-------------+--------------+
| LVOT peak   | 77.14   | cm/s      | [facility] |              |
| travon         |         |           |             |              |
+-------------+---------+-----------+-------------+--------------+
| AV peak travon | 139.85  | cm/s      | [facility] |              |
+-------------+---------+-----------+-------------+--------------+
| AV peak     | 7.82    | mmHg      | [facility] |              |
| gradient    |         |           |             |              |
+-------------+---------+-----------+-------------+--------------+
| LA Volume   | 42      | mL/m2     | [facility] |              |
| Index       |         |           |             |              |
+-------------+---------+-----------+-------------+--------------+
| AV LVOT     | 2.38    | mmHg      | [facility] |              |
| Peak        |         |           |             |              |
| Gradient    |         |           |             |              |
+-------------+---------+-----------+-------------+--------------+
| TR Peak     | 18      | mmHg      | [facility] |              |
| Gradient    |         |           |             |              |
+-------------+---------+-----------+-------------+--------------+
| TR Velocity | 214.5   | cm        | [facility] |              |
+-------------+---------+-----------+-------------+--------------+
| LV          | 7.19    | cm        | [facility] |              |
| Diastolic   |         |           |             |              |
| Length 4C   |         |           |             |              |
+-------------+---------+-----------+-------------+--------------+
| LV          | 50      | %         | [facility] |              |
| Moran's   |         |           |             |              |
| Biplane EF  |         |           |             |              |
+-------------+---------+-----------+-------------+--------------+
| LV ED       | 45.92   | ml        | [facility] |              |
| Volume      |         |           |             |              |
| (Moran's) |         |           |             |              |
+-------------+---------+-----------+-------------+--------------+
| LV ED       | 20      | ml/m2     | [facility] |              |
 
| Volume      |         |           |             |              |
| Index       |         |           |             |              |
+-------------+---------+-----------+-------------+--------------+
| LV ES       | 23.42   | ml        | [facility] |              |
| Volume      |         |           |             |              |
+-------------+---------+-----------+-------------+--------------+
| MV          | 197.48  | msec      | [facility] |              |
| Deceleratio |         |           |             |              |
| n Time      |         |           |             |              |
+-------------+---------+-----------+-------------+--------------+
| MV Peak     | 81.53   | cm/s      | [facility] |              |
| E-Wave      |         |           |             |              |
+-------------+---------+-----------+-------------+--------------+
| LA/Aorta    | 1.19    |           | [facility] |              |
| Ratio       |         |           |             |              |
+-------------+---------+-----------+-------------+--------------+
| LA Area     | 27.42   | cm2       | [facility] |              |
+-------------+---------+-----------+-------------+--------------+
| LV ES       | 10      | ml/m2     | [facility] |              |
| Volume      |         |           |             |              |
| Index       |         |           |             |              |
+-------------+---------+-----------+-------------+--------------+
| Aortic Root | 3.7     | cm        | [facility] |              |
|  Diameter   |         |           |             |              |
+-------------+---------+-----------+-------------+--------------+
| IVS         | 1.15    | cm        | [facility] |              |
| Diastolic   |         |           |             |              |
| Thickness   |         |           |             |              |
| MM          |         |           |             |              |
+-------------+---------+-----------+-------------+--------------+
| LVPW        | 1.21    | cm        | [facility] |              |
| Diastolic   |         |           |             |              |
| Thickness   |         |           |             |              |
| MM          |         |           |             |              |
+-------------+---------+-----------+-------------+--------------+
| IVS         | 1.29    | cm        | [facility] |              |
| Systolic    |         |           |             |              |
| Thickness   |         |           |             |              |
| MM          |         |           |             |              |
+-------------+---------+-----------+-------------+--------------+
| LV Systolic | 2.97    | cm        | [facility] |              |
|  Diameter   |         |           |             |              |
| MM          |         |           |             |              |
+-------------+---------+-----------+-------------+--------------+
| LVPW        | 1.65    | cm        | [facility] |              |
| Systolic    |         |           |             |              |
| Thickness   |         |           |             |              |
| MM          |         |           |             |              |
+-------------+---------+-----------+-------------+--------------+
| AV Cusp     | 1.61    | cm        | [facility] |              |
| Seperation  |         |           |             |              |
| MM          |         |           |             |              |
+-------------+---------+-----------+-------------+--------------+
| LA Systolic | 4.4     | cm        | [facility] |              |
|  Diameter   |         |           |             |              |
| MM          |         |           |             |              |
+-------------+---------+-----------+-------------+--------------+
| LVEF-TTE    | 55      | %         | [facility] |              |
| TRANSTHORAC |         |           |             |              |
| IC ECHO     |         |           |             |              |
 
+-------------+---------+-----------+-------------+--------------+
| RA PRESSURE | 8       | mmHg      | [facility] |              |
+-------------+---------+-----------+-------------+--------------+
| RVSP        | 26      | mmHg      | [facility] |              |
| Estimated   |         |           |             |              |
+-------------+---------+-----------+-------------+--------------+
 
 
 
+----------+
| Specimen |
+----------+
|          |
+----------+
 
 
 
+------------------------------------------------------------------------+---------------+
| Narrative                                                              | Performed At  |
+------------------------------------------------------------------------+---------------+
|   This result has an attachment that is not available.  1.   Mild      |   [facility] |
| biatrial dilatation.2.   Normal left ventricular size with a mild      |               |
| concentric left ventricular hypertrophy.   Left ventricular systolic   |               |
| function is preserved.   LVEF is 55-60%.3.   Mildly thickened and      |               |
| calcified trileaflet aortic valve with adequate opening.   There is    |               |
| aortic valve sclerosis without significant aortic valve stenosis.4.    |               |
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
| change.   There is a mild mitral valve regurgitation.5.   Mild mitral  |               |
| annular calcification.6.   Mild tricuspid valve regurgitation.7.       |               |
|   Normal right-sided pressure.   8.   Dilated IVC with a normal        |               |
| respiratory collapse.9.   When compared to echocardiography on         |               |
| 2018, no significant changes.                                     |               |
|8.   Dilated IVC with a normal respiratory collapse.                    |               |
|9.   When compared to echocardiography on 2018, no significant    |               |
|changes.                                                               |               |
+------------------------------------------------------------------------+---------------+
 
 
 
+---------------+---------+--------------------+--------------+
| Performing    | Address | City/State/Zipcode | Phone Number |
| Organization  |         |                    |              |
+---------------+---------+--------------------+--------------+
|   [facility] |         |                    |              |
+---------------+---------+--------------------+--------------+
 documented in this encounter
 
 Visit Diagnoses
 
 
+-------------------------------------------------------------------------------------+
| Diagnosis                                                                           |
+-------------------------------------------------------------------------------------+
|   Coronary artery disease involving native coronary artery of native heart without  |
| angina pectoris                                                                     |
+-------------------------------------------------------------------------------------+
|   Hypertension, essential  Unspecified essential hypertension                       |
+-------------------------------------------------------------------------------------+
|   Mixed hyperlipidemia                                                              |
+-------------------------------------------------------------------------------------+
 
|   PVC (premature ventricular contraction)  Other premature beats                    |
+-------------------------------------------------------------------------------------+
 documented in this encounter

## 2020-01-08 NOTE — XMS
Encounter Summary
  Created on: 2020
 
 Viviana Ricci
 External Reference #: [id number]
 : 47
 Sex: Female
 
 Demographics
 
 
+-----------------------+----------------------+
| Address               | [address]      |
|                       | [place]  [address] |
+-----------------------+----------------------+
| Home Phone            | [phone]      |
+-----------------------+----------------------+
| Preferred Language    | Unknown              |
+-----------------------+----------------------+
| Marital Status        | Single               |
+-----------------------+----------------------+
| Congregation Affiliation | 1009                 |
+-----------------------+----------------------+
| Race                  | Unknown              |
+-----------------------+----------------------+
| Ethnic Group          | Unknown              |
+-----------------------+----------------------+
 
 
 Author
 
 
+--------------+--------------------------------------------+
| Author       | [facility]  |
|              | and [facility]                                |
+--------------+--------------------------------------------+
| Organization | [facility]  |
|              | and [facility]                                |
+--------------+--------------------------------------------+
| Address      | Unknown                                    |
+--------------+--------------------------------------------+
| Phone        | Unavailable                                |
+--------------+--------------------------------------------+
 
 
 
 Support
 
 
+----------------+--------------+---------------------+-----------------+
| Name           | Relationship | Address             | Phone           |
+----------------+--------------+---------------------+-----------------+
| Ada/Ed Radhames | ECON         | [address]              | [phone] |
|                |              | [place]  |                 |
|                |              |  [address]              |                 |
+----------------+--------------+---------------------+-----------------+
 
 
 
 
 Care Team Providers
 
 
+------------------------+------+-----------------+
| Care Team Member Name  | Role | Phone           |
+------------------------+------+-----------------+
| Marzena Moser DO | PCP  | [phone] |
+------------------------+------+-----------------+
 
 
 
 Encounter Details
 
 
+--------+-------------+---------------------+----------------------+-------------+
| Date   | Type        | Department          | Care Team            | Description |
+--------+-------------+---------------------+----------------------+-------------+
| 10/31/ | Documentati |   [facility]         |   Marznea Moser  |             |
| 2019   | on          | NEPHROLOGY  [address]   | M, DO  [address]      |             |
|        |             | [address]   | [address]      |             |
|        |             | [place], [place]     | [place], [place]      |             |
|        |             | [address]          | [address]  [phone]  |             |
|        |             | [phone]        |  [phone] (Fax)  |             |
+--------+-------------+---------------------+----------------------+-------------+
 
 
 
 Social History
 
 
+--------------+-------+-----------+--------+------+
| Tobacco Use  | Types | Packs/Day | Years  | Date |
|              |       |           | Used   |      |
+--------------+-------+-----------+--------+------+
| Never Smoker |       |           |        |      |
+--------------+-------+-----------+--------+------+
 
 
 
+---------------------+---+---+---+
| Smokeless Tobacco:  |   |   |   |
| Never Used          |   |   |   |
+---------------------+---+---+---+
 
 
 
+---------------------------------------------------------------+
| Comments: some second hand smoke exposure, but fairly minimal |
+---------------------------------------------------------------+
 
 
 
+-------------+-------------+---------+----------+
| Alcohol Use | Drinks/Week | oz/Week | Comments |
+-------------+-------------+---------+----------+
| No          |             |         |          |
+-------------+-------------+---------+----------+
 
 
 
 
+------------------+---------------+
| Sex Assigned at  | Date Recorded |
| Birth            |               |
+------------------+---------------+
| Not on file      |               |
+------------------+---------------+
 
 
 
+----------------+-------------+-------------+
| Job Start Date | Occupation  | Industry    |
+----------------+-------------+-------------+
| Not on file    | Not on file | Not on file |
+----------------+-------------+-------------+
 
 
 
+----------------+--------------+------------+
| Travel History | Travel Start | Travel End |
+----------------+--------------+------------+
 
 
 
+-------------------------------------+
| No recent travel history available. |
+-------------------------------------+
 documented as of this encounter
 
 Progress Notes
 Maite Raygoza - 10/31/2019  4:57 PM PDTCertificate of Medical Necessity CMS-484 Oxygen. 
 Sent to scan.Electronically signed by Maite Raygoza at 10/31/2019  4:58 PM PDTdocumented 
in this encounter
 
 Plan of Treatment
 
 
+--------+-----------+------------+----------------------+-------------------+
| Date   | Type      | Specialty  | Care Team            | Description       |
+--------+-----------+------------+----------------------+-------------------+
| / | Office    | Cardiology |   Tonia Wilson,    |                   |
|    | Visit     |            | ARNJESSICA  [address]   |                   |
|        |           |            | St  [place], [place]  |                   |
|        |           |            | [address]  [phone]  |                   |
|        |           |            |  [phone] (Fax)  |                   |
+--------+-----------+------------+----------------------+-------------------+
| / | Hospital  | Radiology  |   Popeye Townsend, |                   |
|    | Encounter |            |  MD  [address] |                   |
|        |           |            |  St.  [place],   |                   |
|        |           |            | [place] [address]             |                   |
|        |           |            | [phone]         |                   |
|        |           |            | [phone] (Fax)   |                   |
+--------+-----------+------------+----------------------+-------------------+
| / | Surgery   | Radiology  |   Popeye Townsend, | CV EP PPM SYSTEM  |
|    |           |            |  MD  [address] | IMPLANT           |
|        |           |            |  St.  [place],   |                   |
|        |           |            | [place] [address]             |                   |
|        |           |            | [phone]         |                   |
|        |           |            | [phone] (Fax)   |                   |
+--------+-----------+------------+----------------------+-------------------+
 
| 02/10/ | Clinical  | Cardiology |                      |                   |
|    | Support   |            |                      |                   |
+--------+-----------+------------+----------------------+-------------------+
| / | Office    | Cardiology |   Tonia Wilson,    |                   |
|    | Visit     |            | POP  [address]   |                   |
|        |           |            | [place]  |                   |
|        |           |            | [address]  [phone]  |                   |
|        |           |            |  [phone] (Fax)  |                   |
+--------+-----------+------------+----------------------+-------------------+
| / | Off-Site  | Nephrology |   Marzena Moser  |                   |
|    | Visit     |            | DO ETIENNE  [address] [place] [address]      |                   |
|        |           |            | [address]      |                   |
|        |           |            | [place]      |                   |
|        |           |            | [address] [phone]  |                   |
|        |           |            |  [phone] (Fax)  |                   |
+--------+-----------+------------+----------------------+-------------------+
 documented as of this encounter
 
 Visit Diagnoses
 Not on filedocumented in this encounter

## 2020-01-08 NOTE — XMS
Encounter Summary
  Created on: 2020
 
 Viivana Ricci
 External Reference #: [id number]
 : 47
 Sex: Female
 
 Demographics
 
 
+-----------------------+----------------------+
| Address               | [address]      |
|                       | [place]  [address] |
+-----------------------+----------------------+
| Home Phone            | [phone]      |
+-----------------------+----------------------+
| Preferred Language    | Unknown              |
+-----------------------+----------------------+
| Marital Status        | Single               |
+-----------------------+----------------------+
| Yazdanism Affiliation | 1009                 |
+-----------------------+----------------------+
| Race                  | Unknown              |
+-----------------------+----------------------+
| Ethnic Group          | Unknown              |
+-----------------------+----------------------+
 
 
 Author
 
 
+--------------+--------------------------------------------+
| Author       | [facility]  |
|              | and [facility]                                |
+--------------+--------------------------------------------+
| Organization | [facility]  |
|              | and [facility]                                |
+--------------+--------------------------------------------+
| Address      | Unknown                                    |
+--------------+--------------------------------------------+
| Phone        | Unavailable                                |
+--------------+--------------------------------------------+
 
 
 
 Support
 
 
+----------------+--------------+---------------------+-----------------+
| Name           | Relationship | Address             | Phone           |
+----------------+--------------+---------------------+-----------------+
| Ada/Ed Radhames | ECON         | [address]              | [phone] |
|                |              | [place]  |                 |
|                |              |  [address]              |                 |
+----------------+--------------+---------------------+-----------------+
 
 
 
 
 Care Team Providers
 
 
+-----------------------+------+-------------+
| Care Team Member Name | Role | Phone       |
+-----------------------+------+-------------+
 PCP  | Unavailable |
+-----------------------+------+-------------+
 
 
 
 Reason for Visit
 
 
+-------------------+----------+
| Reason            | Comments |
+-------------------+----------+
| Medication Refill |          |
+-------------------+----------+
 
 
 
 Encounter Details
 
 
+--------+--------+---------------------+----------------------+-------------------+
| Date   | Type   | Department          | Care Team            | Description       |
+--------+--------+---------------------+----------------------+-------------------+
| / | Refill |   [facility]         |   Marzena Moser  | Medication Refill |
| 2017   |        | NEPHROLOGY  [address]   | M, DO  [address]      |                   |
|        |        | [address]   | [address]      |                   |
|        |        | [place], [place]     | [place], [place]      |                   |
|        |        | [address]          | [address]  [phone]  |                   |
|        |        | [phone]        |  [phone] (Fax)  |                   |
+--------+--------+---------------------+----------------------+-------------------+
 
 
 
 Social History
 
 
+--------------+-------+-----------+--------+------+
| Tobacco Use  | Types | Packs/Day | Years  | Date |
|              |       |           | Used   |      |
+--------------+-------+-----------+--------+------+
| Never Smoker |       |           |        |      |
+--------------+-------+-----------+--------+------+
 
 
 
+---------------------+---+---+---+
| Smokeless Tobacco:  |   |   |   |
| Never Used          |   |   |   |
+---------------------+---+---+---+
 
 
 
+---------------------------------------------------------------+
| Comments: some second hand smoke exposure, but fairly minimal |
 
+---------------------------------------------------------------+
 
 
 
+-------------+-------------+---------+----------+
| Alcohol Use | Drinks/Week | oz/Week | Comments |
+-------------+-------------+---------+----------+
| No          |             |         |          |
+-------------+-------------+---------+----------+
 
 
 
+------------------+---------------+
| Sex Assigned at  | Date Recorded |
| Birth            |               |
+------------------+---------------+
| Not on file      |               |
+------------------+---------------+
 
 
 
+----------------+-------------+-------------+
| Job Start Date | Occupation  | Industry    |
+----------------+-------------+-------------+
| Not on file    | Not on file | Not on file |
+----------------+-------------+-------------+
 
 
 
+----------------+--------------+------------+
| Travel History | Travel Start | Travel End |
+----------------+--------------+------------+
 
 
 
+-------------------------------------+
| No recent travel history available. |
+-------------------------------------+
 documented as of this encounter
 
 Plan of Treatment
 
 
+--------+-----------+------------+----------------------+-------------------+
| Date   | Type      | Specialty  | Care Team            | Description       |
+--------+-----------+------------+----------------------+-------------------+
| / | Office    | Cardiology |   HellalfredoTonia,    |                   |
|    | Visit     |            | ARNP  [address]   |                   |
|        |           |            | St  [place], [place]  |                   |
|        |           |            | [address]  [phone]  |                   |
|        |           |            |  [phone] (Fax)  |                   |
+--------+-----------+------------+----------------------+-------------------+
| / | Hospital  | Radiology  |   Popeye Townsend, |                   |
|    | Encounter |            |  MD  [address] |                   |
|        |           |            |  St.  [place],   |                   |
|        |           |            | [place] [address]             |                   |
|        |           |            | [phone]         |                   |
|        |           |            | [phone] (Fax)   |                   |
+--------+-----------+------------+----------------------+-------------------+
| / | Surgery   | Radiology  |   Popeye Townsend, | CV EP PPM SYSTEM  |
 
|    |           |            |  MD  [address] | IMPLANT           |
|        |           |            |  St.  [place],   |                   |
|        |           |            | [place] [address]             |                   |
|        |           |            | [phone]         |                   |
|        |           |            | [phone] (Fax)   |                   |
+--------+-----------+------------+----------------------+-------------------+
| 02/10/ | Clinical  | Cardiology |                      |                   |
|    | Support   |            |                      |                   |
+--------+-----------+------------+----------------------+-------------------+
| / | Office    | Cardiology |   Tonia Wilson,    |                   |
|    | Visit     |            | ARNP  [address]   |                   |
|        |           |            | [place]  |                   |
|        |           |            | [address]  [phone]  |                   |
|        |           |            |  [phone] (Fax)  |                   |
+--------+-----------+------------+----------------------+-------------------+
| / | Off-Site  | Nephrology |   Marzena Moser  |                   |
|    | Visit     |            | DO ETIENNE  [address] [place] [address]      |                   |
|        |           |            | [address]      |                   |
|        |           |            | [place]      |                   |
|        |           |            | [address]  [phone]  |                   |
|        |           |            |  [phone] (Fax)  |                   |
+--------+-----------+------------+----------------------+-------------------+
 documented as of this encounter
 
 Visit Diagnoses
 Not on filedocumented in this encounter

## 2020-01-08 NOTE — XMS
Encounter Summary
  Created on: 2020
 
 Viviana Ricci
 External Reference #: [id number]
 : 47
 Sex: Female
 
 Demographics
 
 
+-----------------------+----------------------+
| Address               | [address]      |
|                       | [place]  [address] |
+-----------------------+----------------------+
| Home Phone            | [phone]      |
+-----------------------+----------------------+
| Preferred Language    | Unknown              |
+-----------------------+----------------------+
| Marital Status        | Single               |
+-----------------------+----------------------+
| Worship Affiliation | 1009                 |
+-----------------------+----------------------+
| Race                  | Unknown              |
+-----------------------+----------------------+
| Ethnic Group          | Unknown              |
+-----------------------+----------------------+
 
 
 Author
 
 
+--------------+--------------------------------------------+
| Author       | [facility]  |
|              | and [facility]                                |
+--------------+--------------------------------------------+
| Organization | [facility]  |
|              | and [facility]                                |
+--------------+--------------------------------------------+
| Address      | Unknown                                    |
+--------------+--------------------------------------------+
| Phone        | Unavailable                                |
+--------------+--------------------------------------------+
 
 
 
 Support
 
 
+----------------+--------------+---------------------+-----------------+
| Name           | Relationship | Address             | Phone           |
+----------------+--------------+---------------------+-----------------+
| Ada/Ed Radhames | ECON         | [address]              | [phone] |
|                |              | [place]  |                 |
|                |              |  [address]              |                 |
+----------------+--------------+---------------------+-----------------+
 
 
 
 
 Care Team Providers
 
 
+-----------------------+------+-------------+
| Care Team Member Name | Role | Phone       |
+-----------------------+------+-------------+
 PCP  | Unavailable |
+-----------------------+------+-------------+
 
 
 
 Encounter Details
 
 
+--------+----------+---------------------+----------------------+-------------+
| Date   | Type     | Department          | Care Team            | Description |
+--------+----------+---------------------+----------------------+-------------+
| / | Abstract |   [facility]  [place]         |   Marzena Moser  |             |
|    |          | NEPHROLOGY  [address]   | M, DO  [address]      |             |
|        |          | [address]   | [address]      |             |
|        |          | [place], [place]     | [place]      |             |
|        |          | [address]          | [address]  [phone]  |             |
|        |          | [phone]        |  [phone] (Fax)  |             |
+--------+----------+---------------------+----------------------+-------------+
 
 
 
 Social History
 
 
+--------------+-------+-----------+--------+------+
| Tobacco Use  | Types | Packs/Day | Years  | Date |
|              |       |           | Used   |      |
+--------------+-------+-----------+--------+------+
| Never Smoker |       |           |        |      |
+--------------+-------+-----------+--------+------+
 
 
 
+---------------------+---+---+---+
| Smokeless Tobacco:  |   |   |   |
| Never Used          |   |   |   |
+---------------------+---+---+---+
 
 
 
+---------------------------------------------------------------+
| Comments: some second hand smoke exposure, but fairly minimal |
+---------------------------------------------------------------+
 
 
 
+-------------+-------------+---------+----------+
| Alcohol Use | Drinks/Week | oz/Week | Comments |
+-------------+-------------+---------+----------+
| No          |             |         |          |
+-------------+-------------+---------+----------+
 
 
 
 
+------------------+---------------+
| Sex Assigned at  | Date Recorded |
| Birth            |               |
+------------------+---------------+
| Not on file      |               |
+------------------+---------------+
 
 
 
+----------------+-------------+-------------+
| Job Start Date | Occupation  | Industry    |
+----------------+-------------+-------------+
| Not on file    | Not on file | Not on file |
+----------------+-------------+-------------+
 
 
 
+----------------+--------------+------------+
| Travel History | Travel Start | Travel End |
+----------------+--------------+------------+
 
 
 
+-------------------------------------+
| No recent travel history available. |
+-------------------------------------+
 documented as of this encounter
 
 Plan of Treatment
 
 
+--------+-----------+------------+----------------------+-------------------+
| Date   | Type      | Specialty  | Care Team            | Description       |
+--------+-----------+------------+----------------------+-------------------+
| / | Office    | Cardiology |   Tonia Wilson,    |                   |
|    | Visit     |            | ARNJESSICA  [address]   |                   |
|        |           |            | [place]  |                   |
|        |           |            | [address]  [phone]  |                   |
|        |           |            |  [phone] (Fax)  |                   |
+--------+-----------+------------+----------------------+-------------------+
| / | Hospital  | Radiology  |   Popeye Townsend, |                   |
|    | Encounter |            |  MD  [address] |                   |
|        |           |            |  St.  [place],   |                   |
|        |           |            | [place] [address]             |                   |
|        |           |            | [phone]         |                   |
|        |           |            | [phone] (Fax)   |                   |
+--------+-----------+------------+----------------------+-------------------+
| / | Surgery   | Radiology  |   Popeye Townsend, | CV EP PPM SYSTEM  |
|    |           |            |  MD  [address] | IMPLANT           |
|        |           |            |  St.  [place],   |                   |
|        |           |            | [place] [address]             |                   |
|        |           |            | [phone]         |                   |
|        |           |            | [phone] (Fax)   |                   |
+--------+-----------+------------+----------------------+-------------------+
| 02/10/ | Clinical  | Cardiology |                      |                   |
|    | Support   |            |                      |                   |
+--------+-----------+------------+----------------------+-------------------+
| / | Office    | Cardiology |   Tonia Wilson,    |                   |
|    | Visit     |            | ARNP  [address]   |                   |
 
|        |           |            | St  [place], [place]  |                   |
|        |           |            | [address]  [phone]  |                   |
|        |           |            |  [phone] (Fax)  |                   |
+--------+-----------+------------+----------------------+-------------------+
| / | Off-Site  | Nephrology |   Marzena Moser  |                   |
|    | Visit     |            | DO ETIENNE  [address] [place] [address]      |                   |
|        |           |            | [address]      |                   |
|        |           |            | [place]      |                   |
|        |           |            | [address]  [phone]  |                   |
|        |           |            |  [phone] (Fax)  |                   |
+--------+-----------+------------+----------------------+-------------------+
 documented as of this encounter
 
 Procedures
 
 
+----------------+--------+------------+----------------------+----------------------+
| Procedure Name | Priori | Date/Time  | Associated Diagnosis | Comments             |
|                | ty     |            |                      |                      |
+----------------+--------+------------+----------------------+----------------------+
| CULTURE, URINE | Routin | 2015 |                      |   Results for this   |
|                | e      |            |                      | procedure are in the |
|                |        |            |                      |  results section.    |
+----------------+--------+------------+----------------------+----------------------+
 documented in this encounter
 
 Results
 Culture, Urine (2015)
 
+-----------+---------+-----------+------------+--------------+
| Component | Value   | Ref Range | Performed  | Pathologist  |
|           |         |           | At         | Signature    |
+-----------+---------+-----------+------------+--------------+
| Urine     | >[id number] |           |            |              |
| Culture,  |         |           |            |              |
| Routine   |         |           |            |              |
+-----------+---------+-----------+------------+--------------+
 
 
 
+-----------------+
| Specimen        |
+-----------------+
| Urine specimen  |
| (specimen)      |
+-----------------+
 
 
 
+-------------+----------------+--------+----------------+
| Organism    | Antibiotic     | Method | Susceptibility |
+-------------+----------------+--------+----------------+
| Raoultella  | Ciprofloxacin  |        |   Sensitive    |
| planticola  |                |        |                |
+-------------+----------------+--------+----------------+
| Raoultella  | Nitrofurantoin |        |   Sensitive    |
| planticola  |                |        |                |
+-------------+----------------+--------+----------------+
| Raoultella  | Gentamicin     |        |   Sensitive    |
| planticola  |                |        |                |
 
+-------------+----------------+--------+----------------+
| Raoultella  | Meropenem      |        |   Sensitive    |
| planticola  |                |        |                |
+-------------+----------------+--------+----------------+
| Raoultella  | Tetracycline   |        |   Sensitive    |
| planticola  |                |        |                |
+-------------+----------------+--------+----------------+
| Raoultella  | Aztreonam      |        |   Sensitive    |
| planticola  |                |        |                |
+-------------+----------------+--------+----------------+
| Raoultella  | Ampicillin     |        |   Resistant    |
| planticola  |                |        |                |
+-------------+----------------+--------+----------------+
 documented in this encounter
 
 Visit Diagnoses
 Not on filedocumented in this encounter

## 2020-01-08 NOTE — XMS
Encounter Summary
  Created on: 2020
 
 Viviana Ricci
 External Reference #: [id number]
 : 47
 Sex: Female
 
 Demographics
 
 
+-----------------------+----------------------+
| Address               | [address]      |
|                       | [place]  [address] |
+-----------------------+----------------------+
| Home Phone            | [phone]      |
+-----------------------+----------------------+
| Preferred Language    | Unknown              |
+-----------------------+----------------------+
| Marital Status        | Single               |
+-----------------------+----------------------+
| Buddhism Affiliation | 1009                 |
+-----------------------+----------------------+
| Race                  | Unknown              |
+-----------------------+----------------------+
| Ethnic Group          | Unknown              |
+-----------------------+----------------------+
 
 
 Author
 
 
+--------------+--------------------------------------------+
| Author       | [facility]  |
|              | and [facility]                                |
+--------------+--------------------------------------------+
| Organization | [facility]  |
|              | and [facility]                                |
+--------------+--------------------------------------------+
| Address      | Unknown                                    |
+--------------+--------------------------------------------+
| Phone        | Unavailable                                |
+--------------+--------------------------------------------+
 
 
 
 Support
 
 
+----------------+--------------+---------------------+-----------------+
| Name           | Relationship | Address             | Phone           |
+----------------+--------------+---------------------+-----------------+
| Ada/Ed Radhames | ECON         | [address]              | [phone] |
|                |              | [place], [place]  |                 |
|                |              |  [address]              |                 |
+----------------+--------------+---------------------+-----------------+
 
 
 
 
 Care Team Providers
 
 
+-----------------------+------+-------------+
| Care Team Member Name | Role | Phone       |
+-----------------------+------+-------------+
 PCP  | Unavailable |
+-----------------------+------+-------------+
 
 
 
 Encounter Details
 
 
+--------+-------------+----------------------+----------------------+-------------+
| Date   | Type        | Department           | Care Team            | Description |
+--------+-------------+----------------------+----------------------+-------------+
| / | Orders Only |   [facility] [place]          |   Maricruz Flanagan, | Hypoxemia   |
|    |             | PULMONARY  [address]     |  RN                  |             |
|        |             | [place]  [place], |                      |             |
|        |             |  [place] [address]       |                      |             |
|        |             | [phone]         |                      |             |
+--------+-------------+----------------------+----------------------+-------------+
 
 
 
 Social History
 
 
+--------------+-------+-----------+--------+------+
| Tobacco Use  | Types | Packs/Day | Years  | Date |
|              |       |           | Used   |      |
+--------------+-------+-----------+--------+------+
| Never Smoker |       |           |        |      |
+--------------+-------+-----------+--------+------+
 
 
 
+---------------------+---+---+---+
| Smokeless Tobacco:  |   |   |   |
| Never Used          |   |   |   |
+---------------------+---+---+---+
 
 
 
+---------------------------------------------------------------+
| Comments: some second hand smoke exposure, but fairly minimal |
+---------------------------------------------------------------+
 
 
 
+-------------+-------------+---------+----------+
| Alcohol Use | Drinks/Week | oz/Week | Comments |
+-------------+-------------+---------+----------+
| No          |             |         |          |
+-------------+-------------+---------+----------+
 
 
 
 
+------------------+---------------+
| Sex Assigned at  | Date Recorded |
| Birth            |               |
+------------------+---------------+
| Not on file      |               |
+------------------+---------------+
 
 
 
+----------------+-------------+-------------+
| Job Start Date | Occupation  | Industry    |
+----------------+-------------+-------------+
| Not on file    | Not on file | Not on file |
+----------------+-------------+-------------+
 
 
 
+----------------+--------------+------------+
| Travel History | Travel Start | Travel End |
+----------------+--------------+------------+
 
 
 
+-------------------------------------+
| No recent travel history available. |
+-------------------------------------+
 documented as of this encounter
 
 Plan of Treatment
 
 
+--------+-----------+------------+----------------------+-------------------+
| Date   | Type      | Specialty  | Care Team            | Description       |
+--------+-----------+------------+----------------------+-------------------+
| / | Office    | Cardiology |   Tonia Wilson,    |                   |
|    | Visit     |            | [address]   |                   |
|        |           |            | St  [place], [place]  |                   |
|        |           |            | [address]  [phone]  |                   |
|        |           |            |  [phone] (Fax)  |                   |
+--------+-----------+------------+----------------------+-------------------+
| / | Hospital  | Radiology  |   Popeye Townsend, |                   |
|    | Encounter |            |  MD  [address] |                   |
|        |           |            |  St.  [place],   |                   |
|        |           |            | [place] [address]             |                   |
|        |           |            | [phone]         |                   |
|        |           |            | [phone] (Fax)   |                   |
+--------+-----------+------------+----------------------+-------------------+
| / | Surgery   | Radiology  |   Popeye Townsend, | CV EP PPM SYSTEM  |
| 2020   |           |            |  MD  [address] | IMPLANT           |
|        |           |            |  St.  [place],   |                   |
|        |           |            | [place] [address]             |                   |
|        |           |            | [phone]         |                   |
|        |           |            | [phone] (Fax)   |                   |
+--------+-----------+------------+----------------------+-------------------+
| 02/10/ | Clinical  | Cardiology |                      |                   |
|    | Support   |            |                      |                   |
+--------+-----------+------------+----------------------+-------------------+
| / | Office    | Cardiology |   Tonia Wilson,    |                   |
|    | Visit     |            | ARNJESSICA  [address]   |                   |
|        |           |            | St  [place], [place]  |                   |
 
|        |           |            | [address]  [phone]  |                   |
|        |           |            |  [phone] (Fax)  |                   |
+--------+-----------+------------+----------------------+-------------------+
| / | Off-Site  | Nephrology |   Marzena Moser  |                   |
|    | Visit     |            | DO ETIENNE  [address] [place] [address]      |                   |
|        |           |            | [address]      |                   |
|        |           |            | [place]      |                   |
|        |           |            | [address]  [phone]  |                   |
|        |           |            |  [phone] (Fax)  |                   |
+--------+-----------+------------+----------------------+-------------------+
 documented as of this encounter
 
 Visit Diagnoses
 
 
+-------------+
| Diagnosis   |
+-------------+
|   Hypoxemia |
+-------------+
 documented in this encounter

## 2020-01-08 NOTE — XMS
Encounter Summary
  Created on: 2020
 
 Viviana Ricci
 External Reference #: [id number]
 : 47
 Sex: Female
 
 Demographics
 
 
+-----------------------+----------------------+
| Address               | [address]      |
|                       | [place]  [address] |
+-----------------------+----------------------+
| Home Phone            | [phone]      |
+-----------------------+----------------------+
| Preferred Language    | Unknown              |
+-----------------------+----------------------+
| Marital Status        | Single               |
+-----------------------+----------------------+
| Nondenominational Affiliation | [id number]                 |
+-----------------------+----------------------+
| Race                  | Unknown              |
+-----------------------+----------------------+
| Ethnic Group          | Unknown              |
+-----------------------+----------------------+
 
 
 Author
 
 
+--------------+--------------------------------------------+
| Author       | [facility]  |
|              | and [place]                                |
+--------------+--------------------------------------------+
| Organization | [facility]  |
|              | and [place]                                |
+--------------+--------------------------------------------+
| Address      | Unknown                                    |
+--------------+--------------------------------------------+
| Phone        | Unavailable                                |
+--------------+--------------------------------------------+
 
 
 
 Support
 
 
+----------------+--------------+---------------------+-----------------+
| Name           | Relationship | Address             | Phone           |
+----------------+--------------+---------------------+-----------------+
| Ada/Ed Radhames | ECON         | [address]              | [phone] |
|                |              | [place]  |                 |
|                |              |  [address]              |                 |
+----------------+--------------+---------------------+-----------------+
 
 
 
 
 Care Team Providers
 
 
+-----------------------+------+-------------+
| Care Team Member Name | Role | Phone       |
+-----------------------+------+-------------+
 PCP  | Unavailable |
+-----------------------+------+-------------+
 
 
 
 Reason for Visit
 
 
+---------+----------+
| Reason  | Comments |
+---------+----------+
| Results |          |
+---------+----------+
 
 
 
 Encounter Details
 
 
+--------+-----------+----------------------+----------------------+-------------+
| Date   | Type      | Department           | Care Team            | Description |
+--------+-----------+----------------------+----------------------+-------------+
| / | Telephone |   [facility]          |   Maricruz Flanagan, | Results     |
|    |           | PULMONARY  [address]     |  RN                  |             |
|        |           | [place]  [place], |                      |             |
|        |           |  [place] [address]       |                      |             |
|        |           | [phone]         |                      |             |
+--------+-----------+----------------------+----------------------+-------------+
 
 
 
 Social History
 
 
+--------------+-------+-----------+--------+------+
| Tobacco Use  | Types | Packs/Day | Years  | Date |
|              |       |           | Used   |      |
+--------------+-------+-----------+--------+------+
| Never Smoker |       |           |        |      |
+--------------+-------+-----------+--------+------+
 
 
 
+---------------------+---+---+---+
| Smokeless Tobacco:  |   |   |   |
| Never Used          |   |   |   |
+---------------------+---+---+---+
 
 
 
+---------------------------------------------------------------+
| Comments: some second hand smoke exposure, but fairly minimal |
+---------------------------------------------------------------+
 
 
 
 
+-------------+-------------+---------+----------+
| Alcohol Use | Drinks/Week | oz/Week | Comments |
+-------------+-------------+---------+----------+
| No          |             |         |          |
+-------------+-------------+---------+----------+
 
 
 
+------------------+---------------+
| Sex Assigned at  | Date Recorded |
| Birth            |               |
+------------------+---------------+
| Not on file      |               |
+------------------+---------------+
 
 
 
+----------------+-------------+-------------+
| Job Start Date | Occupation  | Industry    |
+----------------+-------------+-------------+
| Not on file    | Not on file | Not on file |
+----------------+-------------+-------------+
 
 
 
+----------------+--------------+------------+
| Travel History | Travel Start | Travel End |
+----------------+--------------+------------+
 
 
 
+-------------------------------------+
| No recent travel history available. |
+-------------------------------------+
 documented as of this encounter
 
 Plan of Treatment
 
 
+--------+-----------+------------+----------------------+-------------------+
| Date   | Type      | Specialty  | Care Team            | Description       |
+--------+-----------+------------+----------------------+-------------------+
| / | Office    | Cardiology |   Tonia Wilson,    |                   |
|    | Visit     |            | ARNJESSICA  [address]   |                   |
|        |           |            | St  [place] [facility], [place]  |                   |
|        |           |            | [address]  [phone]  |                   |
|        |           |            |  [phone] (Fax)  |                   |
+--------+-----------+------------+----------------------+-------------------+
| / | Hospital  | Radiology  |   Popeye Townsend, |                   |
|    | Encounter |            |  MD  [address] |                   |
|        |           |            |  [address]ikkie  [place],   |                   |
|        |           |            | [place] [address]             |                   |
|        |           |            | [phone]         |                   |
|        |           |            | [phone] (Fax)   |                   |
+--------+-----------+------------+----------------------+-------------------+
| / | Surgery   | Radiology  |   Popeye Townsend, | CV EP PPM SYSTEM  |
|    |           |            |  MD  [address] | IMPLANT           |
 
|        |           |            |  [address]  [place],   |                   |
|        |           |            | [place] [address]             |                   |
|        |           |            | [phone]         |                   |
|        |           |            | [phone] (Fax)   |                   |
+--------+-----------+------------+----------------------+-------------------+
| 02/10/ | Clinical  | Cardiology |                      |                   |
|    | Support   |            |                      |                   |
+--------+-----------+------------+----------------------+-------------------+
| / | Office    | Cardiology |   Tonia Wilson,    |                   |
|    | Visit     |            | BELKIS  [address]   |                   |
|        |           |            | [place]  |                   |
|        |           |            | [address]  [phone]  |                   |
|        |           |            |  [phone] (Fax)  |                   |
+--------+-----------+------------+----------------------+-------------------+
| / | Off-Site  | Nephrology |   Marzena Moser  |                   |
|    | Visit     |            | DO ETIENNE  [address] [place] [address]      |                   |
|        |           |            | [address]      |                   |
|        |           |            | [place]      |                   |
|        |           |            | [address] [phone]  |                   |
|        |           |            |  [phone] (Fax)  |                   |
+--------+-----------+------------+----------------------+-------------------+
 documented as of this encounter
 
 Visit Diagnoses
 Not on filedocumented in this encounter

## 2020-01-08 NOTE — XMS
Encounter Summary
  Created on: 2020
 
 Viviana Ricci
 External Reference #: [id number]
 : 47
 Sex: Female
 
 Demographics
 
 
+-----------------------+----------------------+
| Address               | [address]      |
|                       | [place]  [address] |
+-----------------------+----------------------+
| Home Phone            | [phone]      |
+-----------------------+----------------------+
| Preferred Language    | Unknown              |
+-----------------------+----------------------+
| Marital Status        | Single               |
+-----------------------+----------------------+
| Jainism Affiliation | [id number]                 |
+-----------------------+----------------------+
| Race                  | Unknown              |
+-----------------------+----------------------+
| Ethnic Group          | Unknown              |
+-----------------------+----------------------+
 
 
 Author
 
 
+--------------+--------------------------------------------+
| Author       | [facility]  |
|              | and [place]                                |
+--------------+--------------------------------------------+
| Organization | [facility]  |
|              | and [place]                                |
+--------------+--------------------------------------------+
| Address      | Unknown                                    |
+--------------+--------------------------------------------+
| Phone        | Unavailable                                |
+--------------+--------------------------------------------+
 
 
 
 Support
 
 
+----------------+--------------+---------------------+-----------------+
| Name           | Relationship | Address             | Phone           |
+----------------+--------------+---------------------+-----------------+
| Ada/Ed Radhames | ECON         | [address]              | [phone] |
|                |              | [place], [place]  |                 |
|                |              |  [address]              |                 |
+----------------+--------------+---------------------+-----------------+
 
 
 
 
 Care Team Providers
 
 
+-----------------------+------+-------------+
| Care Team Member Name | Role | Phone       |
+-----------------------+------+-------------+
 PCP  | Unavailable |
+-----------------------+------+-------------+
 
 
 
 Encounter Details
 
 
+--------+-----------+----------------------+----------------------+-------------+
| Date   | Type      | Department           | Care Team            | Description |
+--------+-----------+----------------------+----------------------+-------------+
| / | [facility]  |   [facility] |   Edgar Sanders,   |             |
|  - | Encounter |  MED CTR GENERIC IP  | MD  [address] [facility]     |             |
|        |           | CONV DEPT  [address]     | [place]      |             |
| / |           | [place], | [address]  [phone]  |             |
|    |           |  [place] [address]       |  [phone] (Fax)  |             |
|        |           | [phone]         |                      |             |
+--------+-----------+----------------------+----------------------+-------------+
 
 
 
 Social History
 
 
+----------------+-------+-----------+--------+------+
| Tobacco Use    | Types | Packs/Day | Years  | Date |
|                |       |           | Used   |      |
+----------------+-------+-----------+--------+------+
| Never Assessed |       |           |        |      |
+----------------+-------+-----------+--------+------+
 
 
 
+------------------+---------------+
| Sex Assigned at  | Date Recorded |
| Birth            |               |
+------------------+---------------+
| Not on file      |               |
+------------------+---------------+
 
 
 
+----------------+-------------+-------------+
| Job Start Date | Occupation  | Industry    |
+----------------+-------------+-------------+
| Not on file    | Not on file | Not on file |
+----------------+-------------+-------------+
 
 
 
+----------------+--------------+------------+
| Travel History | Travel Start | Travel End |
+----------------+--------------+------------+
 
 
 
 
+-------------------------------------+
| No recent travel history available. |
+-------------------------------------+
 documented as of this encounter
 
 Plan of Treatment
 
 
+--------+-----------+------------+----------------------+-------------------+
| Date   | Type      | Specialty  | Care Team            | Description       |
+--------+-----------+------------+----------------------+-------------------+
| / | Office    | Cardiology |   Tonia Wilson,    |                   |
|    | Visit     |            | ARNJESSICA  [address]   |                   |
|        |           |            | St  [facility] [place]  |                   |
|        |           |            | [address]  [phone]  |                   |
|        |           |            |  [phone] (Fax)  |                   |
+--------+-----------+------------+----------------------+-------------------+
| / | Hospital  | Radiology  |   Popeye Townsend, |                   |
|    | Encounter |            |  MD  [address] |                   |
|        |           |            |  [place],   |                   |
|        |           |            | [place] [address]             |                   |
|        |           |            | [phone]         |                   |
|        |           |            | [phone] (Fax)   |                   |
+--------+-----------+------------+----------------------+-------------------+
| / | Surgery   | Radiology  |   Popeye Townsend, | CV EP PPM SYSTEM  |
|    |           |            |  MD  [address] | IMPLANT           |
|        |           |            |  St.  [place],   |                   |
|        |           |            | [place] [address]             |                   |
|        |           |            | [phone]         |                   |
|        |           |            | [phone] (Fax)   |                   |
+--------+-----------+------------+----------------------+-------------------+
| 02/10/ | Clinical  | Cardiology |                      |                   |
|    | Support   |            |                      |                   |
+--------+-----------+------------+----------------------+-------------------+
| / | Office    | Cardiology |   Tonia Wilson,    |                   |
|    | Visit     |            | BELKIS  [address]   |                   |
|        |           |            | [place]  |                   |
|        |           |            | [id number] [phone]  |                   |
|        |           |            |  [phone] (Fax)  |                   |
+--------+-----------+------------+----------------------+-------------------+
| / | Off-Site  | Nephrology |   Marzena Moser  |                   |
|    | Visit     |            | DO ETIENNE  [address] [place] [address]      |                   |
|        |           |            | [address]      |                   |
|        |           |            | [place]      |                   |
|        |           |            | [address] [phone]  |                   |
|        |           |            |  [phone] (Fax)  |                   |
+--------+-----------+------------+----------------------+-------------------+
 documented as of this encounter
 
 Visit Diagnoses
 Not on filedocumented in this encounter

## 2020-01-08 NOTE — XMS
Encounter Summary
  Created on: 2020
 
 Viviana Ricci
 External Reference #: [id number]
 : 47
 Sex: Female
 
 Demographics
 
 
+-----------------------+----------------------+
| Address               | [address]      |
|                       | [place]  [address] |
+-----------------------+----------------------+
| Home Phone            | [phone]      |
+-----------------------+----------------------+
| Preferred Language    | Unknown              |
+-----------------------+----------------------+
| Marital Status        | Single               |
+-----------------------+----------------------+
| Jehovah's witness Affiliation | [id number]                 |
+-----------------------+----------------------+
| Race                  | Unknown              |
+-----------------------+----------------------+
| Ethnic Group          | Unknown              |
+-----------------------+----------------------+
 
 
 Author
 
 
+--------------+--------------------------------------------+
| Author       | [facility]  |
|              | and [place]                                |
+--------------+--------------------------------------------+
| Organization | [facility]  |
|              | and [place]                                |
+--------------+--------------------------------------------+
| Address      | Unknown                                    |
+--------------+--------------------------------------------+
| Phone        | Unavailable                                |
+--------------+--------------------------------------------+
 
 
 
 Support
 
 
+----------------+--------------+---------------------+-----------------+
| Name           | Relationship | Address             | Phone           |
+----------------+--------------+---------------------+-----------------+
| Ada/Ed Radhames | ECON         | [place]              | [phone] |
|                |              | [place]  |                 |
|                |              |  [address]              |                 |
+----------------+--------------+---------------------+-----------------+
 
 
 
 
 Care Team Providers
 
 
+-----------------------+------+-------------+
| Care Team Member Name | Role | Phone       |
+-----------------------+------+-------------+
 PCP  | Unavailable |
+-----------------------+------+-------------+
 
 
 
 Reason for Visit
 
 
+-------------------+----------+
| Reason            | Comments |
+-------------------+----------+
| Medication Refill |          |
+-------------------+----------+
 
 
 
 Encounter Details
 
 
+--------+--------+---------------------+----------------------+-------------------+
| Date   | Type   | Department          | Care Team            | Description       |
+--------+--------+---------------------+----------------------+-------------------+
| 10/14/ | Refill |   [facility]         |   Marzena Moser  | Medication Refill |
|    |        | NEPHROLOGY  [address]   | M, DO  [address]      |                   |
|        |        | [address]   | [address]      |                   |
|        |        | [place], [place]     | [place], [place]      |                   |
|        |        | [address]          | [address]  [phone]  |                   |
|        |        | [phone]        |  [phone] (Fax)  |                   |
+--------+--------+---------------------+----------------------+-------------------+
 
 
 
 Social History
 
 
+--------------+-------+-----------+--------+------+
| Tobacco Use  | Types | Packs/Day | Years  | Date |
|              |       |           | Used   |      |
+--------------+-------+-----------+--------+------+
| Never Smoker |       |           |        |      |
+--------------+-------+-----------+--------+------+
 
 
 
+---------------------+---+---+---+
| Smokeless Tobacco:  |   |   |   |
| Never Used          |   |   |   |
+---------------------+---+---+---+
 
 
 
+---------------------------------------------------------------+
| Comments: some second hand smoke exposure, but fairly minimal |
 
+---------------------------------------------------------------+
 
 
 
+-------------+-------------+---------+----------+
| Alcohol Use | Drinks/Week | oz/Week | Comments |
+-------------+-------------+---------+----------+
| No          |             |         |          |
+-------------+-------------+---------+----------+
 
 
 
+------------------+---------------+
| Sex Assigned at  | Date Recorded |
| Birth            |               |
+------------------+---------------+
| Not on file      |               |
+------------------+---------------+
 
 
 
+----------------+-------------+-------------+
| Job Start Date | Occupation  | Industry    |
+----------------+-------------+-------------+
| Not on file    | Not on file | Not on file |
+----------------+-------------+-------------+
 
 
 
+----------------+--------------+------------+
| Travel History | Travel Start | Travel End |
+----------------+--------------+------------+
 
 
 
+-------------------------------------+
| No recent travel history available. |
+-------------------------------------+
 documented as of this encounter
 
 Plan of Treatment
 
 
+--------+-----------+------------+----------------------+-------------------+
| Date   | Type      | Specialty  | Care Team            | Description       |
+--------+-----------+------------+----------------------+-------------------+
| / | Office    | Cardiology |   HellalfredoTonia,    |                   |
|    | Visit     |            | ARNP  [address]   |                   |
|        |           |            | St  [place], [place]  |                   |
|        |           |            | [address]  [phone]  |                   |
|        |           |            |  [phone] (Fax)  |                   |
+--------+-----------+------------+----------------------+-------------------+
| / | Hospital  | Radiology  |   Popeye Townsend, |                   |
|    | Encounter |            |  MD  [address] |                   |
|        |           |            |  St.  [place],   |                   |
|        |           |            | [place] [address]             |                   |
|        |           |            | [phone]         |                   |
|        |           |            | [phone] (Fax)   |                   |
+--------+-----------+------------+----------------------+-------------------+
| / | Surgery   | Radiology  |   Popeye Townsend, | CV EP PPM SYSTEM  |
 
|    |           |            |  MD  [address] | IMPLANT           |
|        |           |            |  St.  [place],   |                   |
|        |           |            | [place] [address]             |                   |
|        |           |            | [phone]         |                   |
|        |           |            | [phone] (Fax)   |                   |
+--------+-----------+------------+----------------------+-------------------+
| 02/10/ | Clinical  | Cardiology |                      |                   |
|    | Support   |            |                      |                   |
+--------+-----------+------------+----------------------+-------------------+
| / | Office    | Cardiology |   Tonia Wilson,    |                   |
|    | Visit     |            | ARNP  [address]   |                   |
|        |           |            | [place]  |                   |
|        |           |            | [address]  [phone]  |                   |
|        |           |            |  [phone] (Fax)  |                   |
+--------+-----------+------------+----------------------+-------------------+
| / | Off-Site  | Nephrology |   Marzena Moser  |                   |
|    | Visit     |            | DO ETIENNE  [address] [place] [address]      |                   |
|        |           |            | [address]      |                   |
|        |           |            | [place]      |                   |
|        |           |            | [address]  [phone]  |                   |
|        |           |            |  [phone] (Fax)  |                   |
+--------+-----------+------------+----------------------+-------------------+
 documented as of this encounter
 
 Visit Diagnoses
 
 
+-------------------------------------------+
| Diagnosis                                 |
+-------------------------------------------+
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
+-------------------------------------------+
 documented in this encounter

## 2020-01-08 NOTE — XMS
Encounter Summary
  Created on: 2020
 
 Viviana Ricci
 External Reference #: [id number]
 : 47
 Sex: Female
 
 Demographics
 
 
+-----------------------+----------------------+
| Address               | [address]      |
|                       | [place]  [address] |
+-----------------------+----------------------+
| Home Phone            | [phone]      |
+-----------------------+----------------------+
| Preferred Language    | Unknown              |
+-----------------------+----------------------+
| Marital Status        | Single               |
+-----------------------+----------------------+
| Gnosticist Affiliation | [id number]                 |
+-----------------------+----------------------+
| Race                  | Unknown              |
+-----------------------+----------------------+
| Ethnic Group          | Unknown              |
+-----------------------+----------------------+
 
 
 Author
 
 
+--------------+--------------------------------------------+
| Author       | [facility]  |
|              | and [place]                                |
+--------------+--------------------------------------------+
| Organization | [facility]  |
|              | and [place]                                |
+--------------+--------------------------------------------+
| Address      | Unknown                                    |
+--------------+--------------------------------------------+
| Phone        | Unavailable                                |
+--------------+--------------------------------------------+
 
 
 
 Support
 
 
+----------------+--------------+---------------------+-----------------+
| Name           | Relationship | Address             | Phone           |
+----------------+--------------+---------------------+-----------------+
| Ada/Ed Radhames | ECON         | [place]              | [phone] |
|                |              | [place]  |                 |
|                |              |  [id number]              |                 |
+----------------+--------------+---------------------+-----------------+
 
 
 
 
 Care Team Providers
 
 
+-----------------------+------+-------------+
| Care Team Member Name | Role | Phone       |
+-----------------------+------+-------------+
 PCP  | Unavailable |
+-----------------------+------+-------------+
 
 
 
 Encounter Details
 
 
+--------+----------+----------------------+----------------------+-------------+
| Date   | Type     | Department           | Care Team            | Description |
+--------+----------+----------------------+----------------------+-------------+
| / | Abstract |   [facility]  |   [facility] |             |
|    |          | Conversion Location  |  SR                  |             |
|        |          |  [phone]        |                      |             |
+--------+----------+----------------------+----------------------+-------------+
 
 
 
 Social History
 
 
+----------------+-------+-----------+--------+------+
| Tobacco Use    | Types | Packs/Day | Years  | Date |
|                |       |           | Used   |      |
+----------------+-------+-----------+--------+------+
| Never Assessed |       |           |        |      |
+----------------+-------+-----------+--------+------+
 
 
 
+------------------+---------------+
| Sex Assigned at  | Date Recorded |
| Birth            |               |
+------------------+---------------+
| Not on file      |               |
+------------------+---------------+
 
 
 
+----------------+-------------+-------------+
| Job Start Date | Occupation  | Industry    |
+----------------+-------------+-------------+
| Not on file    | Not on file | Not on file |
+----------------+-------------+-------------+
 
 
 
+----------------+--------------+------------+
| Travel History | Travel Start | Travel End |
+----------------+--------------+------------+
 
 
 
 
+-------------------------------------+
| No recent travel history available. |
+-------------------------------------+
 documented as of this encounter
 
 Last Filed Vital Signs
 
 
+-------------------+----------------------+----------------------+----------+
| Vital Sign        | Reading              | Time Taken           | Comments |
+-------------------+----------------------+----------------------+----------+
| Blood Pressure    | 130/78               | 2012 12:00 AM  |          |
|                   |                      | PDT                  |          |
+-------------------+----------------------+----------------------+----------+
| Pulse             | -                    | -                    |          |
+-------------------+----------------------+----------------------+----------+
| Temperature       | -                    | -                    |          |
+-------------------+----------------------+----------------------+----------+
| Respiratory Rate  | -                    | -                    |          |
+-------------------+----------------------+----------------------+----------+
| Oxygen Saturation | -                    | -                    |          |
+-------------------+----------------------+----------------------+----------+
| Inhaled Oxygen    | -                    | -                    |          |
| Concentration     |                      |                      |          |
+-------------------+----------------------+----------------------+----------+
| Weight            | 128.5 kg (283 lb 3.2 | 2012 12:00 AM  |          |
|                   |  oz)                 | PDT                  |          |
+-------------------+----------------------+----------------------+----------+
| Height            | 167.6 cm (5' 6")     | 2010 12:00 AM  |          |
|                   |                      | PDT                  |          |
+-------------------+----------------------+----------------------+----------+
| Body Mass Index   | 45.71                | 2010 12:00 AM  |          |
|                   |                      | PDT                  |          |
+-------------------+----------------------+----------------------+----------+
 documented in this encounter
 
 Plan of Treatment
 
 
+--------+-----------+------------+----------------------+-------------------+
| Date   | Type      | Specialty  | Care Team            | Description       |
+--------+-----------+------------+----------------------+-------------------+
| / | Office    | Cardiology |   Tonia Wilson,    |                   |
| 2020   | Visit     |            | ARNP  [address]   |                   |
|        |           |            | St  [place]  |                   |
|        |           |            | [address]  [phone]  |                   |
|        |           |            |  [phone] (Fax)  |                   |
+--------+-----------+------------+----------------------+-------------------+
| / | Hospital  | Radiology  |   Popeye Townsend, |                   |
|    | Encounter |            |  MD  [address] |                   |
|        |           |            |  St.  [place],   |                   |
|        |           |            | [place] [address]             |                   |
|        |           |            | [phone]         |                   |
|        |           |            | [phone] (Fax)   |                   |
+--------+-----------+------------+----------------------+-------------------+
| / | Surgery   | Radiology  |   Popeye Townsend, | CV EP PPM SYSTEM  |
|    |           |            |  MD  [address] | IMPLANT           |
|        |           |            |  St.  [place],   |                   |
|        |           |            | [place] [address]             |                   |
|        |           |            | [phone]         |                   |
 
|        |           |            | [phone] (Fax)   |                   |
+--------+-----------+------------+----------------------+-------------------+
| 02/10/ | Clinical  | Cardiology |                      |                   |
|    | Support   |            |                      |                   |
+--------+-----------+------------+----------------------+-------------------+
| / | Office    | Cardiology |   Tonia Wilson,    |                   |
|    | Visit     |            | [facility]  [address]   |                   |
|        |           |            | St  [place]  |                   |
|        |           |            | [address]  [phone]  |                   |
|        |           |            |  [phone] (Fax)  |                   |
+--------+-----------+------------+----------------------+-------------------+
| / | Off-Site  | Nephrology |   Marzena Moser  |                   |
2020   | Visit     |            | DO ETIENNE  [address] [place] [address]      |                   |
|        |           |            | [address]      |                   |
|        |           |            | [place], [place]      |                   |
|        |           |            | [address]  [phone]  |                   |
|        |           |            |  [phone] (Fax)  |                   |
+--------+-----------+------------+----------------------+-------------------+
 documented as of this encounter
 
 Procedures
 
 
+----------------------+--------+-------------+----------------------+----------------------
+
| Procedure Name       | Priori | Date/Time   | Associated Diagnosis | Comments             
|
|                      | ty     |             |                      |                      
|
+----------------------+--------+-------------+----------------------+----------------------
+
| LABS - EXTERNAL SCAN |        | 07/10/2013  |                      |   Results for this   
|
|                      |        | 12:00 AM    |                      | procedure are in the 
|
|                      |        | PDT         |                      |  results section.    
|
+----------------------+--------+-------------+----------------------+----------------------
+
| PAP SMEAR            | Routin | 2000  |                      |   Results for this   
|
|                      | e      | 12:00 AM    |                      | procedure are in the 
|
|                      |        | PST         |                      |  results section.    
|
+----------------------+--------+-------------+----------------------+----------------------
+
| TENISHA SCREENING        | Routin | 2000  |                      |   Results for this   
|
| BILATERAL            | e      | 12:00 AM    |                      | procedure are in the 
|
|                      |        | PST         |                      |  results section.    
|
+----------------------+--------+-------------+----------------------+----------------------
+
 documented in this encounter
 
 Results
 LABS - EXTERNAL SCAN (07/10/2013 12:00 AM PDT)
 
 
+------------------------------------------------------------------------+--------------+
| Narrative                                                              | Performed At |
+------------------------------------------------------------------------+--------------+
|   This result has an attachment that is not available.  Ordered by an  |              |
| unspecified provider.                                                  |              |
+------------------------------------------------------------------------+--------------+
 
 
 
+--------------------------------------------+
| Transcriptions                             |
+--------------------------------------------+
|   Onbase, Wamt - 07/10/2013 12:00 AM PDT   |
+--------------------------------------------+
 Pap Smear (2000 12:00 AM PST)
 
+----------+
| Specimen |
+----------+
|          |
+----------+
 
 
 
+-----------+--------------+
| Narrative | Performed At |
+-----------+--------------+
|           |              |
+-----------+--------------+
 TENISHA Screening Bilateral (2000 12:00 AM PST)
 
+----------+
| Specimen |
+----------+
|          |
+----------+
 
 
 
+-----------+--------------+
| Narrative | Performed At |
+-----------+--------------+
|           |              |
+-----------+--------------+
 documented in this encounter
 
 Visit Diagnoses
 Not on filedocumented in this encounter

## 2020-01-08 NOTE — XMS
Encounter Summary
  Created on: 2020
 
 Viviana Ricci
 External Reference #: [id number]
 : 47
 Sex: Female
 
 Demographics
 
 
+-----------------------+----------------------+
| Address               | [address]      |
|                       | [place]  [address] |
+-----------------------+----------------------+
| Home Phone            | [phone]      |
+-----------------------+----------------------+
| Preferred Language    | Unknown              |
+-----------------------+----------------------+
| Marital Status        | Single               |
+-----------------------+----------------------+
| Spiritism Affiliation | [id number]                 |
+-----------------------+----------------------+
| Race                  | Unknown              |
+-----------------------+----------------------+
| Ethnic Group          | Unknown              |
+-----------------------+----------------------+
 
 
 Author
 
 
+--------------+--------------------------------------------+
| Author       | [facility]  |
|              | and [facility]                                |
+--------------+--------------------------------------------+
| Organization | [facility]  |
|              | and [facility]                                |
+--------------+--------------------------------------------+
| Address      | Unknown                                    |
+--------------+--------------------------------------------+
| Phone        | Unavailable                                |
+--------------+--------------------------------------------+
 
 
 
 Support
 
 
+----------------+--------------+---------------------+-----------------+
| Name           | Relationship | Address             | Phone           |
+----------------+--------------+---------------------+-----------------+
| Ada/Ed Radhames | ECON         | [address]              | [phone] |
|                |              | [place]  |                 |
|                |              |  [address]              |                 |
+----------------+--------------+---------------------+-----------------+
 
 
 
 
 Care Team Providers
 
 
+-----------------------+------+-------------+
| Care Team Member Name | Role | Phone       |
+-----------------------+------+-------------+
 PCP  | Unavailable |
+-----------------------+------+-------------+
 
 
 
 Encounter Details
 
 
+--------+-------------+---------------------+----------------------+----------------------+
| Date   | Type        | Department          | Care Team            | Description          |
+--------+-------------+---------------------+----------------------+----------------------+
| / | Orders Only |   [facility]         |   Marzena Moser  | Chronic venous       |
| 2015   |             | NEPHROLOGY  [address]   | M, DO  [address]      | embolism and         |
|        |             | [address]   | [address]      | thrombosis of deep   |
|        |             | [place], [place]     | [place], [place]      | vessels of proximal  |
|        |             | [address]          | [address]  [phone]  | lower extremity,     |
|        |             | [phone]        |  [phone] (Fax)  | left (HCC) (Primary  |
|        |             |                     |                      | Dx)                  |
+--------+-------------+---------------------+----------------------+----------------------+
 
 
 
 Social History
 
 
+--------------+-------+-----------+--------+------+
| Tobacco Use  | Types | Packs/Day | Years  | Date |
|              |       |           | Used   |      |
+--------------+-------+-----------+--------+------+
| Never Smoker |       |           |        |      |
+--------------+-------+-----------+--------+------+
 
 
 
+---------------------+---+---+---+
| Smokeless Tobacco:  |   |   |   |
| Never Used          |   |   |   |
+---------------------+---+---+---+
 
 
 
+---------------------------------------------------------------+
| Comments: some second hand smoke exposure, but fairly minimal |
+---------------------------------------------------------------+
 
 
 
+-------------+-------------+---------+----------+
| Alcohol Use | Drinks/Week | oz/Week | Comments |
+-------------+-------------+---------+----------+
| No          |             |         |          |
+-------------+-------------+---------+----------+
 
 
 
 
+------------------+---------------+
| Sex Assigned at  | Date Recorded |
| Birth            |               |
+------------------+---------------+
| Not on file      |               |
+------------------+---------------+
 
 
 
+----------------+-------------+-------------+
| Job Start Date | Occupation  | Industry    |
+----------------+-------------+-------------+
| Not on file    | Not on file | Not on file |
+----------------+-------------+-------------+
 
 
 
+----------------+--------------+------------+
| Travel History | Travel Start | Travel End |
+----------------+--------------+------------+
 
 
 
+-------------------------------------+
| No recent travel history available. |
+-------------------------------------+
 documented as of this encounter
 
 Progress Bobbi Petit RN - 2015 12:25 PM PST[address] Moser's verbal order, apixaban 2.5
 mg PO BID was order for patient. Electronically signed by Bobbi Hanson RN at 2015
 12:30 PM PSTdocumented in this encounter
 
 Plan of Treatment
 
 
+--------+-----------+------------+----------------------+-------------------+
| Date   | Type      | Specialty  | Care Team            | Description       |
+--------+-----------+------------+----------------------+-------------------+
| / | Office    | Cardiology |   Arlen Wilsona,    |                   |
|    | Visit     |            | ARNP  [address]   |                   |
|        |           |            | [place], [place]  |                   |
|        |           |            | [address]  [phone]  |                   |
|        |           |            |  [phone] (Fax)  |                   |
+--------+-----------+------------+----------------------+-------------------+
| / | Hospital  | Radiology  |   Popeye Townsend, |                   |
2020   | Encounter |            |  MD  [address] |                   |
|        |           |            |  [place],   |                   |
|        |           |            | [place] [address]             |                   |
|        |           |            | [phone]         |                   |
|        |           |            | [phone] (Fax)   |                   |
+--------+-----------+------------+----------------------+-------------------+
| / | Surgery   | Radiology  |   Popeye Townsend, | CV EP PPM SYSTEM  |
|    |           |            |  MD  [address] | IMPLANT           |
|        |           |            |  St.  [place],   |                   |
|        |           |            | [place] [address]             |                   |
|        |           |            | [phone]         |                   |
|        |           |            | [phone] (Fax)   |                   |
 
+--------+-----------+------------+----------------------+-------------------+
| 02/10/ | Clinical  | Cardiology |                      |                   |
|    | Support   |            |                      |                   |
+--------+-----------+------------+----------------------+-------------------+
| / | Office    | Cardiology |   Tonia Wilson,    |                   |
|    | Visit     |            | BELKIS  [address]   |                   |
|        |           |            | [place]  |                   |
|        |           |            | [address]  [phone]  |                   |
|        |           |            |  [phone] (Fax)  |                   |
+--------+-----------+------------+----------------------+-------------------+
| / | Off-Site  | Nephrology |   Marzena Moser  |                   |
|    | Visit     |            | DO ETIENNE  [address] [place] [address]      |                   |
|        |           |            | [address]      |                   |
|        |           |            | [place]      |                   |
|        |           |            | [address]  [phone]  |                   |
|        |           |            |  [phone] (Fax)  |                   |
+--------+-----------+------------+----------------------+-------------------+
 documented as of this encounter
 
 Visit Diagnoses
 
 
+----------------------------------------------------------------------------------------+
| Diagnosis                                                                              |
+----------------------------------------------------------------------------------------+
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
| left (HCC) - Primary                                                                   |
+----------------------------------------------------------------------------------------+
 documented in this encounter

## 2020-01-08 NOTE — XMS
Encounter Summary
  Created on: 2020
 
 Viviana Ricci
 External Reference #: [id number]
 : 47
 Sex: Female
 
 Demographics
 
 
+-----------------------+----------------------+
| Address               | [address]      |
|                       | [place]  [address] |
+-----------------------+----------------------+
| Home Phone            | [phone]      |
+-----------------------+----------------------+
| Preferred Language    | Unknown              |
+-----------------------+----------------------+
| Marital Status        | Single               |
+-----------------------+----------------------+
| Taoism Affiliation | [id number]                 |
+-----------------------+----------------------+
| Race                  | Unknown              |
+-----------------------+----------------------+
| Ethnic Group          | Unknown              |
+-----------------------+----------------------+
 
 
 Author
 
 
+--------------+--------------------------------------------+
| Author       | [facility]  |
|              | and [facility]                                |
+--------------+--------------------------------------------+
| Organization | [facility]  |
|              | and [place]                                |
+--------------+--------------------------------------------+
| Address      | Unknown                                    |
+--------------+--------------------------------------------+
| Phone        | Unavailable                                |
+--------------+--------------------------------------------+
 
 
 
 Support
 
 
+----------------+--------------+---------------------+-----------------+
| Name           | Relationship | Address             | Phone           |
+----------------+--------------+---------------------+-----------------+
| Ada/Ed Radhames | ECON         | [address]              | [phone] |
|                |              | [place]  |                 |
|                |              |  [address]              |                 |
+----------------+--------------+---------------------+-----------------+
 
 
 
 
 Care Team Providers
 
 
+-----------------------+------+-------------+
| Care Team Member Name | Role | Phone       |
+-----------------------+------+-------------+
 PCP  | Unavailable |
+-----------------------+------+-------------+
 
 
 
 Reason for Visit
 
 
+-----------+----------+
| Reason    | Comments |
+-----------+----------+
| Follow-up |          |
+-----------+----------+
 
 
 
 Encounter Details
 
 
+--------+---------+----------------------+----------------+----------------------+
| Date   | Type    | Department           | Care Team      | Description          |
+--------+---------+----------------------+----------------+----------------------+
| / | Office  |   [facility] [place]          |   [place],  | Pulmonary            |
|    | Visit   | PULMONARY  [address]     | Nena GUSMAN MD  | hypertension         |
|        |         | [place]  [place], |                | (Primary Dx); JACK on |
|        |         |  [place] [address]       |                |  CPAP; Dyspnea;      |
|        |         | [phone]         |                | Pre-operative        |
|        |         |                      |                | respiratory          |
|        |         |                      |                | examination; PLMD    |
|        |         |                      |                | (periodic limb       |
|        |         |                      |                | movement disorder)   |
+--------+---------+----------------------+----------------+----------------------+
 
 
 
 Social History
 
 
+--------------+-------+-----------+--------+------+
| Tobacco Use  | Types | Packs/Day | Years  | Date |
|              |       |           | Used   |      |
+--------------+-------+-----------+--------+------+
| Never Smoker |       |           |        |      |
+--------------+-------+-----------+--------+------+
 
 
 
+---------------------+---+---+---+
| Smokeless Tobacco:  |   |   |   |
| Never Used          |   |   |   |
+---------------------+---+---+---+
 
 
 
 
+---------------------------------------------------------------+
| Comments: some second hand smoke exposure, but fairly minimal |
+---------------------------------------------------------------+
 
 
 
+-------------+-------------+---------+----------+
| Alcohol Use | Drinks/Week | oz/Week | Comments |
+-------------+-------------+---------+----------+
| No          |             |         |          |
+-------------+-------------+---------+----------+
 
 
 
+------------------+---------------+
| Sex Assigned at  | Date Recorded |
| Birth            |               |
+------------------+---------------+
| Not on file      |               |
+------------------+---------------+
 
 
 
+----------------+-------------+-------------+
| Job Start Date | Occupation  | Industry    |
+----------------+-------------+-------------+
| Not on file    | Not on file | Not on file |
+----------------+-------------+-------------+
 
 
 
+----------------+--------------+------------+
| Travel History | Travel Start | Travel End |
+----------------+--------------+------------+
 
 
 
+-------------------------------------+
| No recent travel history available. |
+-------------------------------------+
 documented as of this encounter
 
 Last Filed Vital Signs
 
 
+-------------------+----------------------+----------------------+----------+
| Vital Sign        | Reading              | Time Taken           | Comments |
+-------------------+----------------------+----------------------+----------+
| Blood Pressure    | 104/62               | 2014 12:53 PM  |          |
|                   |                      | PDT                  |          |
+-------------------+----------------------+----------------------+----------+
| Pulse             | 71                   | 2014 12:53 PM  |          |
|                   |                      | PDT                  |          |
+-------------------+----------------------+----------------------+----------+
| Temperature       | -                    | -                    |          |
+-------------------+----------------------+----------------------+----------+
| Respiratory Rate  | -                    | -                    |          |
+-------------------+----------------------+----------------------+----------+
| Oxygen Saturation | 95%                  | 2014 12:53 PM  |          |
 
|                   |                      | PDT                  |          |
+-------------------+----------------------+----------------------+----------+
| Inhaled Oxygen    | -                    | -                    |          |
| Concentration     |                      |                      |          |
+-------------------+----------------------+----------------------+----------+
| Weight            | 126 kg (277 lb 12.8  | 2014 12:53 PM  |          |
|                   | oz)                  | PDT                  |          |
+-------------------+----------------------+----------------------+----------+
| Height            | 167.6 cm (5' 6")     | 2014 12:53 PM  |          |
|                   |                      | PDT                  |          |
+-------------------+----------------------+----------------------+----------+
| Body Mass Index   | 44.84                | 2014 12:53 PM  |          |
|                   |                      | PDT                  |          |
+-------------------+----------------------+----------------------+----------+
 documented in this encounter
 
 Patient Instructions
 Patient Instructions Nena Boykin MD - 2014  1:45 PM PDTWork on getting us 
a CPAP download. If you continues to have issues, let us know, and we can talk to them.Elect
ronically signed by Nena Boykin MD at 2014  1:46 PM PDT
 documented in this encounter
 
 Progress Notes
 Nena Boykin MD - 2014  1:10 PM PDTFormatting of this note might be differe
nt from the original.
 
 Pulmonary Follow Up
 
 HPI 
  
 Viviana Ricci is a 68 y.o. female patient of Marzena Moser here today for pre operati
ve evaluation. 
 
 She is looking at possibly having knee surgery for advanced degenerative arthritis of the k
nees. 
 
 She notes that she has had pretty limited mobility due to her knees, and then back spasm. S
he gets very fatigued, and then gets short of breath.
 
 She notes that she gets short of breath if she carries the groceries in the house up the st
airs. Her typical day to day physical activity involves letting the dog out, answering the p
neo, and going to the bathroom. She notes that she does not get the bed made without having
 to sit down and rest. If she does even simple activity, she gets significant back spasm. Sh
e gets back spasm if she even sits for very long periods of time. She has back spasm if she 
gets up out of a chair. She has purchased some work out videos, to help her do core exercise
s. 
 
 She does not cough chronically, and does not produce mucous. 
 
 She does occasionally get hoarse for no logical reason. She does occasionally get hoarse fo
r no logical reason. She notes very little nasal drainage. She only has nasal drainage when 
she has a cough. 
 
 She has been wearing her CPAP machine every night. She was supposed to bring in a download 
today, but when she took the chip in the download only went through September. They gave her
 a new chip, but this also could not download from the machine. They think that the machine 
is not functioning properly to download. She has been wearing it for the entire time she sle
eps, unless she takes it off when she sleeps. She notes she continues to sleep an erratic sl
eep schedule. She did not feel that getting back on her CPAP made her less tired. She has an
 oxygen bleed in of 2L. 
 
 
 Past Medical History
 Past Medical History 
 Diagnosis Date 
   Kidney replaced by transplant  
   Complication of transplanted kidney  
   Coronary artery disease  
   s/p CABG , cardiac cath in  
   Pulmonary hypertension (HCC)  
   thought secondary to nocturnal hypoxemia 
   Obesity hypoventilation syndrome (HCC)  
   JACK (obstructive sleep apnea)  
   Diabetes mellitus, type 2 (HCC)  
   Secondary hyperparathyroidism (HCC)  
   Hypothyroidism  
   Hyperlipidemia  
   Chronic low back pain  
   Movement disorder  
   tremor of unclear etiology 
   DJD (degenerative joint disease)  
   s/p knee replacement 
   Humerus fracture  
   right supracondylar distal humerus fracture 
   Carpal tunnel syndrome  
   Anemia in chronic renal disease (HCC)  
   resolved after transplant 
   Infection with CMV (cytomegalovirus) (HCC)  
   complicating kidney transplant 
   FSGS (focal segmental glomerulosclerosis) ([facility])  
   ESRD (end stage renal disease) ([facility])  
   HTN (hypertension)  
  
  
 Past Surgical History
 Past Surgical History 
 Procedure Date 
   Cholecystectomy  
   Insert peritoneal catheter 1998 
   x 2 
   Kidney transplant 2000 
   Total knee arthroplasty  
   Right 
   Hysterectomy 2003 
   Abdominal exploration surgery 2006 
   Parathyroidectomy 2007 
   Carpal tunnel release  
   Coronary artery bypass graft 1998 
   3 
  
 
 Social History: 
 History 
 
 Social History 
   Marital Status: Single 
   Spouse Name: N/A 
   Number of Children: 0 
   Years of Education: N/A 
 
 Occupational History 
 
   .  Disabled 
   Retired 
 
 Social History Main Topics 
   Smoking status: Never Smoker  
   Smokeless tobacco: Never Used 
    Comment: some second hand smoke exposure, but fairly minimal 
   Alcohol Use: No 
   Drug Use: No 
   Sexually Active: None 
 
 Other Topics Concern 
   None 
 
 Social History Narrative 
  Exercise: NoneCaffeine: 1 soda dailyLiving situation: aloneHas a dog at home. No other ani
mal exposures. Had birds as a child. Grew up in [place], [place]. 
 
 Allergies:
 No Known Allergies  
 
 Medications:
 Outpatient Encounter Prescriptions as of 2014 
 Medication Sig Dispense Refill 
   allopurinol (ZYLOPRIM) 100 mg tablet Take 1 tablet by mouth Daily.  30 tablet  11 
   aspirin 81 MG EC tablet Take 81 mg by mouth Daily.     
   cholecalciferol (VITAMIN D-3) 2000 UNITS TABS Take 1,000 Units by mouth Three times a w
[place].     
   cinacalcet (SENSIPAR) 30 mg tablet Take 1 tablet by mouth Daily.  30 tablet  12 
   fludrocortisone (FLORINEF) 0.1 mg tablet Take 1 tablet by mouth Every other day.  45 ta
blet  2 
   fluticasone (FLOVENT HFA) 220 mcg/puff inhaler Inhale 1 puff into the lungs 2 times shante
ly. Rinse mouth after use.  1 Inhaler  11 
   furosemide (LASIX) 40 mg tablet Take 1 tablet by mouth Daily.  30 tablet  5 
   glucose blood VI test strips (ONE TOUCH ULTRA TEST) strip Check blood sugar before each
 meal and as directed  100 each  12 
   insulin glargine (LANTUS) 100 units/mL injection Inject 20 Units under the skin every m
orning.  10 mL  prn 
   insulin lispro (HUMALOG) 100 units/mL injection Inject subcutaneously before meals acco
rding to sliding scale  10 pen  5 
   Insulin Syringe-Needle U-100 (BD INSULIN SYRINGE ULTRAFINE) 31G X 5/16" 0.5 ML MISC Use
 before meals and as directed.  100 each  11 
   levothyroxine (LEVOTHROID) 50 mcg tablet Take 1 tablet by mouth Daily.  30 tablet  11 
   lisinopril (PRINIVIL,ZESTRIL) 30 MG tablet Take 1 tablet by mouth Daily.  90 tablet  3 
   loperamide (ANTI-DIARRHEAL) 2 mg capsule Take 2 mg by mouth Daily as needed.     
   magnesium oxide (MAG-OX) 400 mg tablet Take 1 tablet by mouth 2 times daily.  62 tablet
  12 
   metoprolol tartrate (LOPRESSOR) 25 mg tablet Take 1 tablet by mouth 2 times daily.  60 
tablet  11 
   mycophenolate (CELLCEPT) 250 mg capsule Take 250 mg by mouth 3 times daily.     
   omeprazole (PRILOSEC) 20 mg capsule Take one capsule by mouth once daily on an empty st
omach  90 capsule  3 
   predniSONE (DELTASONE) 5 mg tablet Take 1 tablet by mouth Daily.  90 tablet  3 
   Prenatal Multivit-Min-Fe-FA (PRENATAL VITAMINS) 0.8 MG TABS Take 0.8 mg by mouth Daily.
  30 each  11 
   Prenatal Vit-Fe Fumarate-FA (PNV PRENATAL PLUS MULTIVITAMIN) 27-1 MG TABS      
   Respiratory Therapy Supplies MISC Decrease CPAP to 12-18 cmH2O Diagnosis Code(s)327.23.
 Please send order to [facility].  1 each  0 
   rosuvastatin (CRESTOR) 20 mg tablet Take 1 tablet by mouth nightly.  30 tablet  11 
   tacrolimus (PROGRAF) 0.5 mg capsule TAD.     
 
   tacrolimus (PROGRAF) 1 mg capsule Take 1.5 mg by mouth 2 times daily.     
   valsartan (DIOVAN) 160 mg tablet Take 1 tablet by mouth Daily.  30 tablet  11 
 
 Review of Systems
 Constitutional:  Denies fever, chills, and sweats. She has lost 22 pounds in the last year.
  
 Sleep:  Using CPAP every night.  
 Eyes:  Denies vision change and eye irritation. 
 ENT:  Denies earache,  decreased hearing, nasal congestion, nosebleeds, sore throat, and ho
arseness. 
 Resp:   See HPI.  
 CV:  Denies chest pain, palpitations, syncope, and peripheral edema. 
 GI:  Denies  heartburn, nausea, vomiting, and abdominal pain. 
 : Denies difficulty emptying bladder. 
 Musculoskeletal: Notes a lot of knee pain, back spasm. Has not done physical therapy in 2 y
ears.  
 
 Objective 
 
 /62 | Pulse 71 | Ht 1.676 m (5' 6") | Wt 126.009 kg (277 lb 12.8 oz) | BMI 44.86 kg/m
2 | SpO2 95% RA
 
 General Appearance:  Alert, cooperative, no distress, appears stated age 
 Head:  Normocephalic, without obvious abnormality, atraumatic 
 Eyes:  PERRL, conjunctiva clear, no scleral icterus, EOM's intact 
 Ears:  Normal TM's, external auditory canals, normal acuity 
 Nose: Nares normal, septum midline, mucosa normal 
 Mouth: No oral lesions or exudate 
 Neck: Supple, symmetrical, no adenopathy 
 Lungs:   No accessory muscle use, breath sounds are clear to auscultation bilaterally, no w
heezes, crackles or rhonchi 
 Chest Wall:  No deformity 
 Heart:  Regular rate and rhythm, no murmur, rub or gallop 
 Abdomen:   Soft, non-tender, non-distended, obese 
 Extremities:  No cyanosis, clubbing, or edema 
 Pulses: Radial pulses 2+ and symmetric 
 Skin: Warm and dry 
 Lymph nodes: Cervical and supraclavicular nodes normal 
 
 Data:
 Pulmonary function tests were performed prior to clinic today and were reviewed and interpr
eted in clinic today.  Spirometry is consistent with normal physiology. Lung volume testing 
is consistent with suggests possible mild restrictive physiology physiology. Diffusion capac
ity is moderately reduced and is not corrected for measured hemoglobin. Spriometry has not c
hanged significantly since 2013. Since 2013, there has been a drop in FVC a
nd FEV1 that exceeds 10% (exact percent not calculated). DLCO has increased since that time.
 Neither DLCO value was corrected for a measured hemoglobin.
 
 Ambulating oximetry was done on 2013 and was reviewed and interpreted in clinic to
day. It shows she required 2 L to keep her saturation more than 88 %. On 2L, her saturation 
remained at %.
 
 Overnight oximetry was done on 2013 on CPAP and was reviewed and interpreted in c
shonda today. It shows she spent 55 minutes with a saturation less than 89%.
 
 Echocardiogram was performed on April 15, 2014 and results were reviewed in clinic today. I
t shows mild left atrial dilatation, normal LV size and function, grade 1 LV diastolic dysfu
nction, mild TR, mild AI, and normal right sided heart pressures. 
 
 Immunization History 
 
 Administered Date(s) Administered 
   INFLUENZA, PRESERVATIVE FREE IM 2012 
 
 Assessment  
 
 1. Pulmonary hypertension - Mild to moderate on right heart cath in , with a mean PA pr
essure of 36mmHg and a wedge of 18 mmHg. This has essentially resolved on follow up echocard
iogram. I suspect her biggest issue was nocturnal hypoxemia, which has been corrected with C
PAP and oxygen bleed in.  
 2. JACK on CPAP - Severe disease, with baseline AHI of 67.9, and desaturation to 59%. She re
ports good compliance with her CPAP. The main issue for her has been an erratic sleep schedu
le and she continues to feel she is not less tired wearing it. We did not get her download (
though she tried to get one done), so we need to make sure her machine is controlling her OS
A. If it is, I suspect the bigger issue is related to sleep habits or PLMD that has gone unt
reated. We can discuss more at further follow up, once we have seen her download and exclude
d uncontrolled apnea as cause.  
 3. Dyspnea - She has dyspnea when she does activity that exceeds her usual day to day activ
ity by a significant degree., To me this is not surprising given her relative baseline activ
ity level which she describes as walking to the bathroom. She needs to do more activity to i
mprove her functional status. PFTs continue to show no evidence of significant pulmonary dis
ease.  
 4. PLMD (periodic limb movement disorder)- Not on medication, and unclear if she ever had a
 ferritin done for follow up. She did have an arousal index of 30 on original sleep study in
 . We will need to explore further in the future as this will impair her sleep quality a
s well.  
 4. Pre-operative respiratory examination - Overall I think from a pulmonary stand point she
 is a reasonable candidate as long as her sleep apnea is well controlled and we would need a
 download to confirm this. She would need to use her CPAP clinton operatively. I also think inc
reased physical activity prior to sugrery would be of benefit and she is working towards thi
s.  
 
 Plan 
 1.Continue CPAP, and get download to trouble shoot issues. 
 2.We can address insomnia and PLMD at follow up appointments. 
 3.I would recommend increased physical activity and therapy pre operatively. 
 4. She would have to use her CPAP perioperatively, mobilize early, use IS frequently and ha
ve aggressive use of DVT prophylaxis given relative sedentary state.
 
 She was advised to call if new pulmonary symptoms were to develop.
 
 Return to clinic TBD based on download from CPAP, or sooner with concerns.
 
 CC: Marzena Moser DO
 
 Portions of this report were transcribed using voice recognition software.  Every effort wa
s made to ensure accuracy; however, inadvertent computerized transcription errors may be pre
sent.
 
 Electronically signed by: Nena Boykin MD 2014 13:10
 
 Electronically signed by Nena Boykin MD at 2014 11:54 AM PDTdocumented in t
his encounter
 
 Plan of Treatment
 
 
+--------+-----------+------------+----------------------+-------------------+
| Date   | Type      | Specialty  | Care Team            | Description       |
+--------+-----------+------------+----------------------+-------------------+
| / | Office    | Cardiology |   Tonia Wilson,    |                   |
 
2020   | Visit     |            | ARNP  [address]   |                   |
|        |           |            | [place], [place]  |                   |
|        |           |            | [address] [phone]  |                   |
|        |           |            |  [phone] (Fax)  |                   |
+--------+-----------+------------+----------------------+-------------------+
| / | Hospital  | Radiology  |   Popeye Townsend, |                   |
2020   | Encounter |            |  MD  [address] |                   |
|        |           |            |  [facility]   |                   |
|        |           |            | [place] [address]             |                   |
|        |           |            | [phone]         |                   |
|        |           |            | [phone] (Fax)   |                   |
+--------+-----------+------------+----------------------+-------------------+
| / | Surgery   | Radiology  |   Popeye Townsend, | CV EP PPM SYSTEM  |
|    |           |            |  MD  [address] | IMPLANT           |
|        |           |            |  [place],   |                   |
|        |           |            | [place] [address]             |                   |
|        |           |            | [phone]         |                   |
|        |           |            | [phone] (Fax)   |                   |
+--------+-----------+------------+----------------------+-------------------+
| 02/10/ | Clinical  | Cardiology |                      |                   |
|    | Support   |            |                      |                   |
+--------+-----------+------------+----------------------+-------------------+
| / | Office    | Cardiology |   Tonia Wilson,    |                   |
|    | Visit     |            | ARNJESSICA  [address]   |                   |
|        |           |            | [place]  |                   |
|        |           |            | [address]  [phone]  |                   |
|        |           |            |  [phone] (Fax)  |                   |
+--------+-----------+------------+----------------------+-------------------+
| / | Off-Site  | Nephrology |   Marzena Moser  |                   |
|    | Visit     |            | DO ETIENNE  [address]      |                   |
|        |           |            | [address]      |                   |
|        |           |            | [place]      |                   |
|        |           |            | [address]  [phone]  |                   |
|        |           |            |  [phone] (Fax)  |                   |
+--------+-----------+------------+----------------------+-------------------+
 documented as of this encounter
 
 Visit Diagnoses
 
 
+----------------------------------------------------------------------------+
| Diagnosis                                                                  |
+----------------------------------------------------------------------------+
|   Pulmonary hypertension - Primary  Other chronic pulmonary heart diseases |
+----------------------------------------------------------------------------+
|   JACK on CPAP  Obstructive sleep apnea (adult) (pediatric)                 |
+----------------------------------------------------------------------------+
|   Dyspnea  Other dyspnea and respiratory abnormality                       |
+----------------------------------------------------------------------------+
|   Pre-operative respiratory examination                                    |
+----------------------------------------------------------------------------+
|   PLMD (periodic limb movement disorder)  Periodic limb movement disorder  |
+----------------------------------------------------------------------------+
 documented in this encounter

## 2020-01-08 NOTE — XMS
Encounter Summary
  Created on: 2020
 
 Viviana Ricci
 External Reference #: [id number]
 : 47
 Sex: Female
 
 Demographics
 
 
+-----------------------+----------------------+
| Address               | [address]      |
|                       | [place]  [address] |
+-----------------------+----------------------+
| Home Phone            | [phone]      |
+-----------------------+----------------------+
| Preferred Language    | Unknown              |
+-----------------------+----------------------+
| Marital Status        | Single               |
+-----------------------+----------------------+
| Oriental orthodox Affiliation | [id number]                 |
+-----------------------+----------------------+
| Race                  | Unknown              |
+-----------------------+----------------------+
| Ethnic Group          | Unknown              |
+-----------------------+----------------------+
 
 
 Author
 
 
+--------------+--------------------------------------------+
| Author       | [facility]  |
|              | and [facility]                                |
+--------------+--------------------------------------------+
| Organization | [facility]  |
|              | and [facility]                                |
+--------------+--------------------------------------------+
| Address      | Unknown                                    |
+--------------+--------------------------------------------+
| Phone        | Unavailable                                |
+--------------+--------------------------------------------+
 
 
 
 Support
 
 
+----------------+--------------+---------------------+-----------------+
| Name           | Relationship | Address             | Phone           |
+----------------+--------------+---------------------+-----------------+
| Ada/Ed Radhames | ECON         | [address]              | [phone] |
|                |              | [place]  |                 |
|                |              |  [id number]              |                 |
+----------------+--------------+---------------------+-----------------+
 
 
 
 
 Care Team Providers
 
 
+-----------------------+------+-------------+
| Care Team Member Name | Role | Phone       |
+-----------------------+------+-------------+
 PCP  | Unavailable |
+-----------------------+------+-------------+
 
 
 
 Encounter Details
 
 
+--------+-----------+----------------------+----------------------+-------------+
| Date   | Type      | Department           | Care Team            | Description |
+--------+-----------+----------------------+----------------------+-------------+
| / | [facility]  |   [facility] |   Marzena Moser  |             |
|    | Encounter |  MED CTR XRAY  [address] | M, DO  [address]      |             |
|        |           |  [place]       | [address]      |             |
|        |           | [place] [address] | [place]      |             |
|        |           |   [phone]       | [address] [phone]  |             |
|        |           |                      |  [phone] (Fax)  |             |
+--------+-----------+----------------------+----------------------+-------------+
 
 
 
 Social History
 
 
+----------------+-------+-----------+--------+------+
| Tobacco Use    | Types | Packs/Day | Years  | Date |
|                |       |           | Used   |      |
+----------------+-------+-----------+--------+------+
| Never Assessed |       |           |        |      |
+----------------+-------+-----------+--------+------+
 
 
 
+------------------+---------------+
| Sex Assigned at  | Date Recorded |
| Birth            |               |
+------------------+---------------+
| Not on file      |               |
+------------------+---------------+
 
 
 
+----------------+-------------+-------------+
| Job Start Date | Occupation  | Industry    |
+----------------+-------------+-------------+
| Not on file    | Not on file | Not on file |
+----------------+-------------+-------------+
 
 
 
+----------------+--------------+------------+
| Travel History | Travel Start | Travel End |
+----------------+--------------+------------+
 
 
 
 
+-------------------------------------+
| No recent travel history available. |
+-------------------------------------+
 documented as of this encounter
 
 Plan of Treatment
 
 
+--------+-----------+------------+----------------------+-------------------+
| Date   | Type      | Specialty  | Care Team            | Description       |
+--------+-----------+------------+----------------------+-------------------+
| / | Office    | Cardiology |   Tonia Wilson,    |                   |
|    | Visit     |            | ARNJESSICA  [address]   |                   |
|        |           |            | St  [place], [place]  |                   |
|        |           |            | [address]  [phone]  |                   |
|        |           |            |  [phone] (Fax)  |                   |
+--------+-----------+------------+----------------------+-------------------+
| / | Hospital  | Radiology  |   Popeye Townsend, |                   |
|    | Encounter |            |  MD  [address] |                   |
|        |           |            |  [place],   |                   |
|        |           |            | [place] [address]             |                   |
|        |           |            | [phone]         |                   |
|        |           |            | [phone] (Fax)   |                   |
+--------+-----------+------------+----------------------+-------------------+
| / | Surgery   | Radiology  |   Popeye Townsend, | CV EP PPM SYSTEM  |
|    |           |            |  MD  [address] | IMPLANT           |
|        |           |            |  [place],   |                   |
|        |           |            | [place] [address]             |                   |
|        |           |            | [phone]         |                   |
|        |           |            | [phone] (Fax)   |                   |
+--------+-----------+------------+----------------------+-------------------+
| 02/10/ | Clinical  | Cardiology |                      |                   |
|    | Support   |            |                      |                   |
+--------+-----------+------------+----------------------+-------------------+
| / | Office    | Cardiology |   Tonia Wilson,    |                   |
|    | Visit     |            | BELKIS  [address]   |                   |
|        |           |            | [place]  |                   |
|        |           |            | [address]  [phone]  |                   |
|        |           |            |  [phone] (Fax)  |                   |
+--------+-----------+------------+----------------------+-------------------+
| / | Off-Site  | Nephrology |   Marzena Moser  |                   |
|    | Visit     |            | DO ETIENNE  [address] [place] [address]      |                   |
|        |           |            | [address]      |                   |
|        |           |            | [place]      |                   |
|        |           |            | [address] [phone]  |                   |
|        |           |            |  [phone] (Fax)  |                   |
+--------+-----------+------------+----------------------+-------------------+
 documented as of this encounter
 
 Visit Diagnoses
 Not on filedocumented in this encounter

## 2020-01-08 NOTE — XMS
Encounter Summary
  Created on: 2020
 
 Viviana Ricci
 External Reference #: [id number]
 : 47
 Sex: Female
 
 Demographics
 
 
+-----------------------+----------------------+
| Address               | [address]      |
|                       | [place]  [address] |
+-----------------------+----------------------+
| Home Phone            | [phone]      |
+-----------------------+----------------------+
| Preferred Language    | Unknown              |
+-----------------------+----------------------+
| Marital Status        | Single               |
+-----------------------+----------------------+
| Restorationism Affiliation | [id number]                 |
+-----------------------+----------------------+
| Race                  | Unknown              |
+-----------------------+----------------------+
| Ethnic Group          | Unknown              |
+-----------------------+----------------------+
 
 
 Author
 
 
+--------------+--------------------------------------------+
| Author       | [facility]  |
|              | and [facility]                                |
+--------------+--------------------------------------------+
| Organization | [facility]  |
|              | and [facility]                                |
+--------------+--------------------------------------------+
| Address      | Unknown                                    |
+--------------+--------------------------------------------+
| Phone        | Unavailable                                |
+--------------+--------------------------------------------+
 
 
 
 Support
 
 
+----------------+--------------+---------------------+-----------------+
| Name           | Relationship | Address             | Phone           |
+----------------+--------------+---------------------+-----------------+
| Ada/Ed Radhames | ECON         | [address]              | [phone] |
|                |              | [place]  |                 |
|                |              |  [id number]              |                 |
+----------------+--------------+---------------------+-----------------+
 
 
 
 
 Care Team Providers
 
 
+-----------------------+------+-------------+
| Care Team Member Name | Role | Phone       |
+-----------------------+------+-------------+
 PCP  | Unavailable |
+-----------------------+------+-------------+
 
 
 
 Encounter Details
 
 
+--------+-----------+----------------------+----------------------+-------------+
| Date   | Type      | Department           | Care Team            | Description |
+--------+-----------+----------------------+----------------------+-------------+
| 07/15/ | [facility]  |   [facility] |   Marzena Moser  |             |
|  - | Encounter |  MED CTR XRAY  [address] | M, DO  [address]      |             |
|        |           |  [place]       | [address]      |             |
| 10/05/ |           | [place] [address] | [place]      |             |
|    |           |   [phone]       | [id number] [phone]  |             |
|        |           |                      |  [phone] (Fax)  |             |
+--------+-----------+----------------------+----------------------+-------------+
 
 
 
 Social History
 
 
+----------------+-------+-----------+--------+------+
| Tobacco Use    | Types | Packs/Day | Years  | Date |
|                |       |           | Used   |      |
+----------------+-------+-----------+--------+------+
| Never Assessed |       |           |        |      |
+----------------+-------+-----------+--------+------+
 
 
 
+------------------+---------------+
| Sex Assigned at  | Date Recorded |
| Birth            |               |
+------------------+---------------+
| Not on file      |               |
+------------------+---------------+
 
 
 
+----------------+-------------+-------------+
| Job Start Date | Occupation  | Industry    |
+----------------+-------------+-------------+
| Not on file    | Not on file | Not on file |
+----------------+-------------+-------------+
 
 
 
+----------------+--------------+------------+
| Travel History | Travel Start | Travel End |
+----------------+--------------+------------+
 
 
 
 
+-------------------------------------+
| No recent travel history available. |
+-------------------------------------+
 documented as of this encounter
 
 Medications at Time of Discharge
 
 
+----------------------+----------------------+-----------+---------+----------+-----------+
| Medication           | Sig                  | Dispensed | Refills | Start    | End Date  |
|                      |                      |           |         | Date     |           |
+----------------------+----------------------+-----------+---------+----------+-----------+
|   allopurinol        | Take 100 mg by mouth |           | 0       | 20 |  |
| (ZYLOPRIM) 100 mg    |  Daily.              |           |         | 12       | 3         |
| tablet               |                      |           |         |          |           |
+----------------------+----------------------+-----------+---------+----------+-----------+
|   aspirin 81 MG EC   | Take 81 mg by mouth  |           | 0       | 20 |  |
| tablet               | Daily.               |           |         | 12       | 5         |
+----------------------+----------------------+-----------+---------+----------+-----------+
|   atorvaSTATin       | Take 80 mg by mouth  |           | 0       | 20 |  |
| (LIPITOR) 80 MG      | Every other day.     |           |         | 12       | 2         |
| tablet               |                      |           |         |          |           |
+----------------------+----------------------+-----------+---------+----------+-----------+
|   Cholecalciferol    | Take 5,000 Units by  |           | 0       | 20 |  |
| (VITAMIN D3) 5000    | mouth Once a week.   |           |         | 12       | 4         |
| UNITS CAPS           |                      |           |         |          |           |
+----------------------+----------------------+-----------+---------+----------+-----------+
|   cinacalcet         | Take 30 mg by mouth  |           | 0       | 20 | 10/29/201 |
| (SENSIPAR) 30 mg     | Daily.               |           |         | 11       | 2         |
| tablet               |                      |           |         |          |           |
+----------------------+----------------------+-----------+---------+----------+-----------+
|   fludrocortisone    | Take 1 tablet by     |   30      | 11      | 10/01/20 |  |
| (FLORINEF) 0.1 mg    | mouth Every other    | tablet    |         | 12       | 4         |
| tablet               | day.                 |           |         |          |           |
+----------------------+----------------------+-----------+---------+----------+-----------+
|   furosemide (LASIX) | Take two tablets by  |           | 0       | 20 |  |
|  40 mg tablet        | mouth daily.         |           |         | 12       | 2         |
+----------------------+----------------------+-----------+---------+----------+-----------+
|   glucose blood VI   | Use as directed      |           | 0       | 20 |  |
| test strips (ONE     |                      |           |         | 12       | 2         |
| TOUCH ULTRA TEST)    |                      |           |         |          |           |
| strip                |                      |           |         |          |           |
+----------------------+----------------------+-----------+---------+----------+-----------+
|   insulin glargine   | Inject 20 Units      |           | 0       | 20 |  |
| (LANTUS) 100         | under the skin every |           |         | 12       | 3         |
| units/mL             |  morning.            |           |         |          |           |
| injectionIndications |                      |           |         |          |           |
| : Unspecified        |                      |           |         |          |           |
| hypertensive kidney  |                      |           |         |          |           |
| disease with chronic |                      |           |         |          |           |
|  kidney disease      |                      |           |         |          |           |
| stage I through      |                      |           |         |          |           |
| stage IV, or         |                      |           |         |          |           |
| unspecified(403.90), |                      |           |         |          |           |
|  Complications of    |                      |           |         |          |           |
| transplanted kidney, |                      |           |         |          |           |
|  Diabetes mellitus   |                      |           |         |          |           |
 
| type II,             |                      |           |         |          |           |
| uncontrolled (HCC),  |                      |           |         |          |           |
| Hyperlipidemia       |                      |           |         |          |           |
+----------------------+----------------------+-----------+---------+----------+-----------+
|   insulin glargine   | Inject 20 units      |           | 0       | 20 |  |
| (LANTUS) 100         | subcutaneously as    |           |         | 12       | 2         |
| units/mL injection   | directed             |           |         |          |           |
+----------------------+----------------------+-----------+---------+----------+-----------+
|   insulin lispro     | Inject               |           | 0       | 20 |  |
| (HUMALOG) 100        | subcutaneously       |           |         | 12       | 3         |
| units/mL injection   | before meals         |           |         |          |           |
|                      | according to sliding |           |         |          |           |
|                      |  scale               |           |         |          |           |
+----------------------+----------------------+-----------+---------+----------+-----------+
|   Insulin            | Use to inject        |           | 0       | 20 |  |
| Syringe-Needle U-100 | insulin              |           |         | 12       | 3         |
|  (BD INSULIN SYRINGE | subcutaneously       |           |         |          |           |
|  ULTRAFINE) 31G X    | before meals or as   |           |         |          |           |
| 5/16" 0.5 ML MISC    | directed             |           |         |          |           |
+----------------------+----------------------+-----------+---------+----------+-----------+
|   levothyroxine      | Take 1 tablet by     |   30      | 11      | 10/01/20 |  |
| (LEVOTHROID) 50 mcg  | mouth Daily.         | tablet    |         | 12       | 3         |
| tablet               |                      |           |         |          |           |
+----------------------+----------------------+-----------+---------+----------+-----------+
|   lisinopril         | Take 30 mg by mouth  |           | 0       | 20 |  |
| (PRINIVIL,ZESTRIL)   | Daily.               |           |         | 11       | 2         |
| 30 MG tablet         |                      |           |         |          |           |
+----------------------+----------------------+-----------+---------+----------+-----------+
|   loperamide         | Take 2 mg by mouth   |           | 0       | 20 |  |
| (ANTI-DIARRHEAL) 2   | Daily as needed.     |           |         | 12       | 4         |
| mg capsule           |                      |           |         |          |           |
+----------------------+----------------------+-----------+---------+----------+-----------+
|   magnesium oxide    | Take 400 mg by mouth |           | 0       | 20 |  |
| (MAG-OX) 400 mg      |  2 times daily.      |           |         | 12       | 3         |
| tablet               |                      |           |         |          |           |
+----------------------+----------------------+-----------+---------+----------+-----------+
|   metoprolol         | Take 25 mg by mouth  |           | 0       | 20 |  |
| tartrate (LOPRESSOR) | 2 times daily.       |           |         | 12       | 3         |
|  25 mg tablet        |                      |           |         |          |           |
+----------------------+----------------------+-----------+---------+----------+-----------+
|   mycophenolate      | Take 250 mg by mouth |           | 0       | 20 | 10/14/201 |
| (CELLCEPT) 250 mg    |  3 times daily.      |           |         | 11       | 5         |
| capsule              |                      |           |         |          |           |
+----------------------+----------------------+-----------+---------+----------+-----------+
|   niacin (NIASPAN)   | Not taking           |           | 0       | 20 |  |
| 500 mg CR tablet     |                      |           |         | 11       | 3         |
+----------------------+----------------------+-----------+---------+----------+-----------+
|   omeprazole         | Take one capsule by  |           | 0       | 20 |  |
| (PRILOSEC) 20 mg     | mouth once daily on  |           |         | 12       | 3         |
| capsule              | an empty stomach     |           |         |          |           |
+----------------------+----------------------+-----------+---------+----------+-----------+
|   predniSONE         | Take 5 mg by mouth   |           | 0       | 20 |  |
| (DELTASONE) 5 mg     | Daily.               |           |         | 12       | 2         |
| tablet               |                      |           |         |          |           |
+----------------------+----------------------+-----------+---------+----------+-----------+
|   Prenatal           | Take 0.8 mg by mouth |           | 0       | 20 |  |
| Multivit-Min-Fe-FA   |  Daily.              |           |         | 12       | 3         |
| (PRENATAL VITAMINS)  |                      |           |         |          |           |
| 0.8 MG TABS          |                      |           |         |          |           |
+----------------------+----------------------+-----------+---------+----------+-----------+
 
|   tacrolimus         | TAD.                 |           | 0       | 20 | 07/15/201 |
| (PROGRAF) 0.5 mg     |                      |           |         | 12       | 4         |
| capsuleIndications:  |                      |           |         |          |           |
| Unspecified          |                      |           |         |          |           |
| hypertensive kidney  |                      |           |         |          |           |
| disease with chronic |                      |           |         |          |           |
|  kidney disease      |                      |           |         |          |           |
| stage I through      |                      |           |         |          |           |
| stage IV, or         |                      |           |         |          |           |
| unspecified(403.90), |                      |           |         |          |           |
|  Complications of    |                      |           |         |          |           |
| transplanted kidney, |                      |           |         |          |           |
|  Diabetes mellitus   |                      |           |         |          |           |
| type II,             |                      |           |         |          |           |
| uncontrolled ([facility]),  |                      |           |         |          |           |
| Hyperlipidemia       |                      |           |         |          |           |
+----------------------+----------------------+-----------+---------+----------+-----------+
|   tacrolimus         | Take one capsule by  |           | 0       | 20 |  |
| (PROGRAF) 0.5 mg     | mouth in the pm WITH |           |         | 12       | 2         |
| capsule              |  one mg capsule in   |           |         |          |           |
|                      | pm, equaling 1.5 mg  |           |         |          |           |
|                      | in the pm            |           |         |          |           |
+----------------------+----------------------+-----------+---------+----------+-----------+
|   tacrolimus         | TAD                  |           | 0       | 20 | 10/10/201 |
| (PROGRAF) 0.5 mg     |                      |           |         | 11       | 2         |
| capsule              |                      |           |         |          |           |
+----------------------+----------------------+-----------+---------+----------+-----------+
|   tacrolimus         | Take 1 mg by mouth 2 |           | 0       | 20 |  |
| (PROGRAF) 1 mg       |  times daily.        |           |         | 12       | 3         |
| capsuleIndications:  |                      |           |         |          |           |
| Unspecified          |                      |           |         |          |           |
| hypertensive kidney  |                      |           |         |          |           |
| disease with chronic |                      |           |         |          |           |
|  kidney disease      |                      |           |         |          |           |
| stage I through      |                      |           |         |          |           |
| stage IV, or         |                      |           |         |          |           |
| unspecified(403.90), |                      |           |         |          |           |
|  Complications of    |                      |           |         |          |           |
| transplanted kidney, |                      |           |         |          |           |
|  Diabetes mellitus   |                      |           |         |          |           |
| type II,             |                      |           |         |          |           |
| uncontrolled ([facility]),  |                      |           |         |          |           |
| Hyperlipidemia       |                      |           |         |          |           |
+----------------------+----------------------+-----------+---------+----------+-----------+
|   tacrolimus         | Take two capsules by |           | 0       | 20 |  |
| (PROGRAF) 1 mg       |  mouth in the am,    |           |         | 12       | 2         |
| capsule              | and one capsule by   |           |         |          |           |
|                      | mouth in the pm      |           |         |          |           |
+----------------------+----------------------+-----------+---------+----------+-----------+
|   tacrolimus         | 1.5 mg twice daily   |           | 0       | 20 | 10/10/201 |
| (PROGRAF) 1 mg       |                      |           |         | 11       | 2         |
| capsule              |                      |           |         |          |           |
+----------------------+----------------------+-----------+---------+----------+-----------+
|   valsartan (DIOVAN) | Take 160 mg by mouth |           | 0       | 20 |  |
|  160 mg tablet       |  Daily.              |           |         | 12       | 3         |
+----------------------+----------------------+-----------+---------+----------+-----------+
 documented as of this encounter
 
 Plan of Treatment
 
 
 
+--------+-----------+------------+----------------------+-------------------+
| Date   | Type      | Specialty  | Care Team            | Description       |
+--------+-----------+------------+----------------------+-------------------+
| / | Office    | Cardiology |   Tonia Wilson,    |                   |
|    | Visit     |            | BELKIS  [address]   |                   |
|        |           |            | [place]  |                   |
|        |           |            | [address]  [phone]  |                   |
|        |           |            |  [phone] (Fax)  |                   |
+--------+-----------+------------+----------------------+-------------------+
| / | Hospital  | Radiology  |   Popeye Townsend, |                   |
|    | Encounter |            |  MD  [address] [place] |                   |
|        |           |            |  St.  [place],   |                   |
|        |           |            | [place] [address]             |                   |
|        |           |            | [phone]         |                   |
|        |           |            | [phone] (Fax)   |                   |
+--------+-----------+------------+----------------------+-------------------+
| / | Surgery   | Radiology  |   Popeye Townsend, | CV EP PPM SYSTEM  |
|    |           |            |  MD  [address] | IMPLANT           |
|        |           |            |  St.  [place],   |                   |
|        |           |            | [place] [address]             |                   |
|        |           |            | [phone]         |                   |
|        |           |            | [phone] (Fax)   |                   |
+--------+-----------+------------+----------------------+-------------------+
| 02/10/ | Clinical  | Cardiology |                      |                   |
|    | Support   |            |                      |                   |
+--------+-----------+------------+----------------------+-------------------+
| / | Office    | Cardiology |   Tonia Wilson,    |                   |
|    | Visit     |            | ARNP  [address]   |                   |
|        |           |            | St  [place]  |                   |
|        |           |            | [address]  [phone]  |                   |
|        |           |            |  [phone] (Fax)  |                   |
+--------+-----------+------------+----------------------+-------------------+
| / | Off-Site  | Nephrology |   Marzena Moser  |                   |
| 2020   | Visit     |            | M, DO  [address]      |                   |
|        |           |            | [address]      |                   |
|        |           |            | [place]      |                   |
|        |           |            | [id number] [phone]  |                   |
|        |           |            |  [phone] (Fax)  |                   |
+--------+-----------+------------+----------------------+-------------------+
 documented as of this encounter
 
 Visit Diagnoses
 Not on filedocumented in this encounter

## 2020-01-08 NOTE — XMS
Encounter Summary
  Created on: 2020
 
 Viviana Ricci
 External Reference #: [id number]
 : 47
 Sex: Female
 
 Demographics
 
 
+-----------------------+----------------------+
| Address               | [address]      |
|                       | [place]  [address] |
+-----------------------+----------------------+
| Home Phone            | [phone]      |
+-----------------------+----------------------+
| Preferred Language    | Unknown              |
+-----------------------+----------------------+
| Marital Status        | Single               |
+-----------------------+----------------------+
| Hoahaoism Affiliation | [id number]                 |
+-----------------------+----------------------+
| Race                  | Unknown              |
+-----------------------+----------------------+
| Ethnic Group          | Unknown              |
+-----------------------+----------------------+
 
 
 Author
 
 
+--------------+--------------------------------------------+
| Author       | [facility]  |
|              | and [facility]                                |
+--------------+--------------------------------------------+
| Organization | [facility]  |
|              | and [facility]                                |
+--------------+--------------------------------------------+
| Address      | Unknown                                    |
+--------------+--------------------------------------------+
| Phone        | Unavailable                                |
+--------------+--------------------------------------------+
 
 
 
 Support
 
 
+----------------+--------------+---------------------+-----------------+
| Name           | Relationship | Address             | Phone           |
+----------------+--------------+---------------------+-----------------+
| Ada/Ed Radhames | ECON         | [address]              | [phone] |
|                |              | [place], [place]  |                 |
|                |              |  [address]              |                 |
+----------------+--------------+---------------------+-----------------+
 
 
 
 
 Care Team Providers
 
 
+-----------------------+------+-------------+
| Care Team Member Name | Role | Phone       |
+-----------------------+------+-------------+
 PCP  | Unavailable |
+-----------------------+------+-------------+
 
 
 
 Reason for Referral
 Diagnostic/Screening (Routine)
 
+--------+--------+-----------+--------------+--------------+---------------+
| Status | Reason | Specialty | Diagnoses /  | Referred By  | Referred To   |
|        |        |           | Procedures   | Contact      | Contact       |
+--------+--------+-----------+--------------+--------------+---------------+
| Closed |        | Radiology |   Diagnoses  |   Tommy,   |   [facility]     |
|        |        |           |  Chronic     | Edgar GARCIA MD   | [address]  |
|        |        |           | right        | [address] |  [place], |
|        |        |           | shoulder     |   [place]      |  [place]           |
|        |        |           | pain         | [place]    | [address]    |
|        |        |           | Procedures   | [address]        | Phone:        |
|        |        |           | MRI Shoulder | Phone:       | [phone]  |
|        |        |           |  Right wo    | [phone] |  Fax:         |
|        |        |           | Contrast     |   Fax:       | [phone]  |
|        |        |           |              | [phone] |               |
+--------+--------+-----------+--------------+--------------+---------------+
 
 
 
 
 Reason for Visit
 
 
+---------------+--------------------------------------------+
| Reason        | Comments                                   |
+---------------+--------------------------------------------+
| Shoulder Pain | EPNP" Right Shoulder Pain onset X Oct 2016 |
+---------------+--------------------------------------------+
 Evaluate & Treat (Routine)
 
+--------+--------------+-------------+--------------+--------------+---------------+
| Status | Reason       | Specialty   | Diagnoses /  | Referred By  | Referred To   |
|        |              |             | Procedures   | Contact      | Contact       |
+--------+--------------+-------------+--------------+--------------+---------------+
| Closed |   Specialty  | Orthopedic  |   Diagnoses  |   Shanti,  |   Tommy    |
|        | Services     | Surgery     |              | Marzena CLIFTON,   | Edgar GARCIA MD    |
|        | Required     |             | Degenerative | DO  [address]  |
|        |              |             |  joint       |  [place], [place] |  [place], |
|        |              |             | disease of   |  [address]  [place]  |  [place] [address]     |
|        |              |             | right        | [place], [place]    | Phone:        |
|        |              |             | acromioclavi | [address]        | [phone]  |
|        |              |             | cular joint  | Phone:       |  Fax:         |
|        |              |             |  Chronic     | [phone] | [phone]  |
|        |              |             | right        |   Fax:       |               |
|        |              |             | shoulder     | [phone] |               |
|        |              |             | pain         |              |               |
 
+--------+--------------+-------------+--------------+--------------+---------------+
 
 
 
 
 Encounter Details
 
 
+--------+---------+----------------------+----------------------+----------------+
| Date   | Type    | Department           | Care Team            | Description    |
+--------+---------+----------------------+----------------------+----------------+
| / | Office  |   [facility]          |   Edgar Sanders,   | Chronic right  |
| 2017   | Visit   | ORTHOPEDIC SURGERY   | MD  [address] [facility]     | shoulder pain  |
|        |         | [address] [facility]     | [place] [facility] [place]      | (Primary Dx)   |
|        |         | [place], [place]      | [address]  [phone]  |                |
|        |         | [address]           |  [phone] (Fax)  |                |
|        |         | [phone]         |                      |                |
+--------+---------+----------------------+----------------------+----------------+
 
 
 
 Social History
 
 
+--------------+-------+-----------+--------+------+
| Tobacco Use  | Types | Packs/Day | Years  | Date |
|              |       |           | Used   |      |
+--------------+-------+-----------+--------+------+
| Never Smoker |       |           |        |      |
+--------------+-------+-----------+--------+------+
 
 
 
+---------------------+---+---+---+
| Smokeless Tobacco:  |   |   |   |
| Never Used          |   |   |   |
+---------------------+---+---+---+
 
 
 
+---------------------------------------------------------------+
| Comments: some second hand smoke exposure, but fairly minimal |
+---------------------------------------------------------------+
 
 
 
+-------------+-------------+---------+----------+
| Alcohol Use | Drinks/Week | oz/Week | Comments |
+-------------+-------------+---------+----------+
| No          |             |         |          |
+-------------+-------------+---------+----------+
 
 
 
+------------------+---------------+
| Sex Assigned at  | Date Recorded |
| Birth            |               |
+------------------+---------------+
| Not on file      |               |
+------------------+---------------+
 
 
 
 
+----------------+-------------+-------------+
| Job Start Date | Occupation  | Industry    |
+----------------+-------------+-------------+
| Not on file    | Not on file | Not on file |
+----------------+-------------+-------------+
 
 
 
+----------------+--------------+------------+
| Travel History | Travel Start | Travel End |
+----------------+--------------+------------+
 
 
 
+-------------------------------------+
| No recent travel history available. |
+-------------------------------------+
 documented as of this encounter
 
 Last Filed Vital Signs
 
 
+-------------------+-------------------+----------------------+----------+
| Vital Sign        | Reading           | Time Taken           | Comments |
+-------------------+-------------------+----------------------+----------+
| Blood Pressure    | -                 | -                    |          |
+-------------------+-------------------+----------------------+----------+
| Pulse             | -                 | -                    |          |
+-------------------+-------------------+----------------------+----------+
| Temperature       | -                 | -                    |          |
+-------------------+-------------------+----------------------+----------+
| Respiratory Rate  | -                 | -                    |          |
+-------------------+-------------------+----------------------+----------+
| Oxygen Saturation | -                 | -                    |          |
+-------------------+-------------------+----------------------+----------+
| Inhaled Oxygen    | -                 | -                    |          |
| Concentration     |                   |                      |          |
+-------------------+-------------------+----------------------+----------+
| Weight            | 121.1 kg (267 lb) | 2017  9:49 AM  |          |
|                   |                   | PST                  |          |
+-------------------+-------------------+----------------------+----------+
| Height            | 167.6 cm (5' 6")  | 2017  9:49 AM  |          |
|                   |                   | PST                  |          |
+-------------------+-------------------+----------------------+----------+
| Body Mass Index   | 43.09             | 2017  9:49 AM  |          |
|                   |                   | PST                  |          |
+-------------------+-------------------+----------------------+----------+
 documented in this encounter
 
 Progress Notes
 Edgar Sanders MD - 2017  9:50 PM PSTFormatting of this note might be different fro
m the original.
 History of present illness: Viviana is a 71 y.o. female who presents with a chief complaint ri
ght shoulder pain
 
 She is new to me but I have known her for many years. I did a right TKA in  and I also 
helped Dr. Hubbard with a supracondylar distal humerus fracture and subsequent hardware re
 
moval right elbow
 I also did an ulnar nerve decompression right elbow subsequent to that timeframe that I rem
ember well but we dont' have records of - she had a midshaft humerus ORIF in 
 She has been on hemodialysis for many years
 Her right shoulder started hurting in November and it hasn't gotten better in spite of time
 and activity modifications
 She hurts with attempt at overhead use
 She has to use her other hand to help her right arm up to overhead position and again to he
lp lower it through the same arc of motion
 With her arm down low she doesn't have much trouble
 She doesn't like to sleep on her right side
 
 Past Medical History 
 Diagnosis Date 
   Kidney replaced by transplant  
   Complication of transplanted kidney  
   Coronary artery disease  
   s/p CABG , cardiac cath in  
   Pulmonary hypertension (HCC)  
   thought secondary to nocturnal hypoxemia 
   Obesity hypoventilation syndrome (HCC)  
   JACK (obstructive sleep apnea)  
   Diabetes mellitus, type 2 (HCC)  
   Secondary hyperparathyroidism ([facility])  
   Hypothyroidism  
   Hyperlipidemia  
   Chronic low back pain  
   Movement disorder  
   tremor of unclear etiology 
   DJD (degenerative joint disease)  
   s/p knee replacement 
   Humerus fracture  
   right supracondylar distal humerus fracture 
   Carpal tunnel syndrome  
   Anemia in chronic renal disease  
   resolved after transplant 
   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  
   complicating kidney transplant 
   FSGS (focal segmental glomerulosclerosis)  
   ESRD (end stage renal disease) ([facility])  
   HTN (hypertension)  
 
 Past Surgical History 
 Procedure Laterality Date 
   Cholecystectomy  1972 
   Insert peritoneal catheter  1998 
   x 2 
   Kidney transplant  2000 
   Total knee arthroplasty  2004 
   Right 
   Hysterectomy  2003 
   Abdominal exploration surgery  2006 
   Parathyroidectomy  2007 
   Carpal tunnel release  2005 
   Coronary artery bypass graft   
   3 
   Xr epidural injection  10/2014 
   In back 
 
 No Known Allergies
 
 Current Outpatient Prescriptions on File Prior to Visit 
 Medication Sig Dispense Refill 
   allopurinol (ZYLOPRIM) 100 mg tablet Take 1 tablet by mouth Daily. 30 tablet 11 
   aspirin 81 mg EC tablet Take 81 mg by mouth Daily.   
   cholecalciferol (VITAMIN D-3) 2000 UNITS TABS Take 5,000 Units by mouth Every other day
.   
   cinacalcet (SENSIPAR) 30 mg tablet Take 1 tablet by mouth Daily. 30 tablet 11 
   fludrocortisone (FLORINEF) 0.1 mg tablet Take 1 tablet by mouth Every other day. 90 tab
let 4 
   furosemide (LASIX) 40 mg tablet Take 1 tablet by mouth Daily as needed. 30 tablet 5 
   glucose blood VI test strips (ONE TOUCH ULTRA TEST) strip Check blood sugar before each
 meal and as directed 100 each 12 
   insulin glargine (LANTUS) 100 units/mL injection (vial) Inject 20 Units under the skin 
every morning. 10 mL 11 
   insulin lispro (HUMALOG) 100 units/mL injection (vial) Inject subcutaneously before sara
ls according to sliding scale 10 vial 11 
   Insulin Syringe-Needle U-100 (BD INSULIN SYRINGE ULTRAFINE) 31G X 5/16" 0.5 ML MISC Use
 before meals and as directed. 100 each 11 
   levothyroxine (LEVOTHROID) 50 mcg tablet Take 1 tablet by mouth Daily. 30 tablet 11 
   lisinopril (PRINIVIL,ZESTRIL) 30 MG tablet Take 1 tablet by mouth Daily. 90 tablet 3 
   loperamide (ANTI-DIARRHEAL) 2 mg capsule Take 1 capsule by mouth 4 times daily as neede
d. 60 capsule 5 
   losartan (COZAAR) 50 mg tablet Take 1 tablet by mouth Daily. 90 tablet 3 
   magnesium oxide (MAG-OX) 400 mg tablet Take 1 tablet by mouth 2 times daily. 60 tablet 
11 
   metoprolol tartrate (LOPRESSOR) 25 mg tablet Take 1 tablet by mouth 2 times daily. 60 t
ablet 11 
   mycophenolate (CELLCEPT) 250 mg capsule TAKE (1) CAPSULE BY MOUTH THREE TIMES DAILY. 90
 capsule 11 
   omeprazole (PRILOSEC) 20 mg capsule Take one capsule by mouth once daily on an empty st
omach 90 capsule 4 
   predniSONE (DELTASONE) 5 mg tablet Take 1 tablet by mouth Daily. 90 tablet 3 
   Prenatal Multivit-Min-Fe-FA (PRENATAL VITAMINS) 0.8 MG TABS Take 0.8 mg by mouth Daily.
 30 each 11 
   Respiratory Therapy Supplies MISC Decrease CPAP to 12-18 cmH2O Diagnosis Code(s)327.23.
 Please send order to [facility]. 1 each 0 
   rosuvastatin (CRESTOR) 20 mg tablet Take 1 tablet by mouth nightly. 30 tablet 11 
   tacrolimus (PROGRAF) 0.5 mg capsule TAKE (1) CAPSULE TWICE DAILY WITH 1MG CAPSULE (TOTA
L DOSE = 1.5MG TWICE DAILY). 60 capsule 11 
   tacrolimus (PROGRAF) 1 mg capsule TAKE (1) CAPSULE TWICE DAILY WITH 0.5MG CAPSULE (TOTA
L DOSE = 1.5MG TWICE DAILY) 60 capsule 11 
 
 No current facility-administered medications on file prior to visit. 
 
 Family History 
 Problem Relation Age of Onset 
   Parkinsonism Father  
   Heart disease Father  
   Ovarian cancer Mother  
   Other (see comment) Brother  
   paralyzed in accident and then  from complications 
 
 Social History 
 
 Social History 
   Marital Status: Single 
   Spouse Name: N/A 
   Number of Children: 0 
   Years of Education: N/A 
 
 
 Occupational History 
   .  Disabled 
   Retired 
 
 Social History Main Topics 
   Smoking status: Never Smoker  
   Smokeless tobacco: Never Used 
    Comment: some second hand smoke exposure, but fairly minimal 
   Alcohol Use: No 
   Drug Use: No 
   Sexual Activity: Not on file 
 
 Other Topics Concern 
   Not on file 
 
 Social History Narrative 
  Exercise: None 
  Caffeine: 1-2 soda daily 
  Living situation: alone in own home 
   
  Has a dog at home. No other animal exposures. Had birds as a child.  
   
  Grew up in [place], [place]. 
   
   
   
   
   
 
 Review of Systems 
 
 Eyes:      [] Double vision     [] Glasses/contacts      [x] Failing vision  
 
 Ear/Nose/Throat:      [] Frequent Colds       [] Sinus Disease         [] Nose obstruction 
  
            [] Sneezing Spells           [] Change in taste           [] Artificial teeth
            [] Ears ringing                 [] Ear pain                      [x] Hearing los
s                             
           [x] Teeth problems         [] Hoarseness           [] Neck swelling 
                        [] Sore throat               [] Congestion
   [] Nosebleeds             [] Nasal allergies
 
 Respiratory:              [] Asthma/Wheezing      [] Pneumonia     [] Night sweats   
          [] Shortness of breath        [] Chronic cough          [] Coughing up blood
    [] Exposure to tuberculosis     
 
 Cardiovascular:        [] Heart Problems      [x] Hypertension     [] Heart murmur  
              [] Palpitations                [] Rheumatic fever              [] Phlebitis
         [] Chest pain           [] Ankle swelling        [] Leg cramps             [] Racin
g heart
  [] Skipping beats               [] Blood clots 
 Gastrointestinal:      [] Abdominal pain     [] Heartburn    [] Blood from rectum   
              [] Colitis                 [] Gallbladder problems                   [] Troubl
e swallowing
  [x] Bloated stomach                [] Change in stools             [] Vomiting blood
                [] Nausea               [] Hemorrhoids           []  Jaundice              [
]  Hepatitis
  [] Diarrhea                   [] Constipation                [] Diverticulitis 
 
 Urinary Tract:      [] Painful urination     [] Kidney Stones     [] Any urine leakage   
 
                [] Weak urine stream   [] Night urination  [] Urine infections  
  [] Bedwetting   [] Blood in urine  
 
 Skin:      [] Skin rashes              [] Itching/Burning             [x] Skin bruises easi
ly  
                [] Artificial tanning          [] Skin cancer           [] Hair loss       
                         [] Changes in moles  
 
 Musculoskeletal:   [x] Physical handicaps [] Back or shoulder pain
  []Rheumatoid disease          [] Osteoarthritis        [x]  Joint pain            
         [] Joint swelling     []Gout                   []  Leg cramps at night
 
 Neurological:      [] Headaches     [] Seizures     [] Stroke/TIA   
                [] Faintness  [] Tremors    [x] Numbness [] Dizziness  
       [] Changes in handwriting  []  Memory loss  []  Shooting pains
 
 Psychiatric:  [] Depression     []  Suicidal thoughts 
  []  Sleep pattern changes                                  [] Appetite changes
  [] Recent counseling    [] Nervousness/anxiety     []  Physical violence 
             []  Marital problems
 Endocrine:      [] Thyroid     [] Diabetes     
 
 Systemic:    []Weight loss/gain (over 10 lbs)      []Fever/chills        []Fatigue   
                      [] Sleeping Difficulties  [] Speech change  [] Voice change
 
 Filed Vitals: 
  17 0949 
 PainSc:   1 
 PainLoc: Shoulder 
  Estimated body mass index is 43.12 kg/(m^2) as calculated from the following:
   Height as of this encounter: 1.676 m (5' 6").
   Weight as of this encounter: 121.11 kg (267 lb).
 
 On physical exam she has weakness to abduction in the scapular plane
 She has good passive forward elevation and abduction
 She has ER 40 degrees
 With internal rotation she can place her right hand to lower lumbar spine
 She is tender to palpation over the coracoacromial arch
 The AC joint is mildly tender
 She doesn't have any posterior tenderness
 Negative drop arm sign but it is very weak
 Negative alisson sign
 Negative gustavo lift off sign
 She has multiple scars about her right upper extremity from all of her past surgeries
 Including her palpable shunt for hemodialysis
 
 xrays right shoulder reviewed by me show no superior migration of the humeral head and no g
lenohumeral joint space narrowing
 
 Assessment:suspect right shoulder rotator cuff tear
 
 Plan: MRI right shoulder
 She will return after MRI is obtained
 We discussed the nature of rotator cuff disease and discussed the options depending on the 
MRI findings
 
  Electronically signed by Edgar Sanders MD at 2017 10:08 AM PSTdocumented in this e
ncounter
 
 Plan of Treatment
 
 
 
+--------+-----------+------------+----------------------+-------------------+
| Date   | Type      | Specialty  | Care Team            | Description       |
+--------+-----------+------------+----------------------+-------------------+
| / | Office    | Cardiology |   Tonia Wilson,    |                   |
|    | Visit     |            | [address]   |                   |
|        |           |            | St [place], [place]  |                   |
|        |           |            | [address]  [phone]  |                   |
|        |           |            |  [phone] (Fax)  |                   |
+--------+-----------+------------+----------------------+-------------------+
| / | Hospital  | Radiology  |   Popeye Townsend, |                   |
|    | Encounter |            |  MD  [address] |                   |
|        |           |            |  [place],   |                   |
|        |           |            | [place] [address]             |                   |
|        |           |            | [phone]         |                   |
|        |           |            | [phone] (Fax)   |                   |
+--------+-----------+------------+----------------------+-------------------+
| / | Surgery   | Radiology  |   Popeye Townsend, | CV EP PPM SYSTEM  |
|    |           |            |  MD  [address] | IMPLANT           |
|        |           |            |  [place],   |                   |
|        |           |            | [place] [address]             |                   |
|        |           |            | [phone]         |                   |
|        |           |            | [phone] (Fax)   |                   |
+--------+-----------+------------+----------------------+-------------------+
| 02/10/ | Clinical  | Cardiology |                      |                   |
|    | Support   |            |                      |                   |
+--------+-----------+------------+----------------------+-------------------+
| / | Office    | Cardiology |   KatieArlena,    |                   |
|    | Visit     |            | ARN  [address]   |                   |
|        |           |            |   [place]  |                   |
|        |           |            | [address]  [phone]  |                   |
|        |           |            |  [phone] (Fax)  |                   |
+--------+-----------+------------+----------------------+-------------------+
| / | Off-Site  | Nephrology |   Marzena Moser  |                   |
|    | Visit     |            | DO [place]  [address] [place] [address]      |                   |
|        |           |            | [address]      |                   |
|        |           |            | [place]      |                   |
|        |           |            | [address]  [phone]  |                   |
|        |           |            |  [phone] (Fax)  |                   |
+--------+-----------+------------+----------------------+-------------------+
 documented as of this encounter
 
 Results
 MRI Shoulder Right wo Contrast (03/10/2017  1:11 PM PST)
 
+----------+
| Specimen |
+----------+
|          |
+----------+
 
 
 
+------------------------------------------------------------------------+-----------------+
| Narrative                                                              | Performed At    |
+------------------------------------------------------------------------+-----------------+
|   EXAM: MRI SHOULDER RIGHT WO CONTRAST dated 3/10/2017 12:36 PM        |   [facility]    |
| HISTORY:   right shoulder pain- evaluate for rotator cuff tear         | [facility]        |
| COMPARISON: Outside radiographs dated 2016.     TECHNIQUE: | MEDICAL CENTER  |
 
|  Multiplanar multisequence MR imaging of the right shoulder without    | - IMAGING       |
| contrast. Imaging is performed on a 3 Marichuy MRI.     FINDINGS:         |                 |
| Rotator Cuff:     There is a near complete tear of the rotator cuff.   |                 |
|   There is a complete  full-thickness tear involving the supraspinatus |                 |
|  tendon.   The fibers are  retracted deep to the acromion.   There are |                 |
|  very heterogeneous, thickened,  irregular.   A significant portion of |                 |
|  the infraspinatus tendon appears similarly  torn and retracted.       |                 |
|   There is a component of the posterior tendon which is  thickened,    |                 |
| irregular, but attached at the humeral insertion.   There is a intact  |                 |
|  teres minor tendon.   There is tendinosis and low to moderate grade   |                 |
| tearing in  the subscapularis tendon.   There is severe atrophy in the |                 |
|  supraspinatus muscle.   There is mild to moderate atrophy in the      |                 |
| infraspinatus muscle.     Labrum and biceps tendon:     Diffuse        |                 |
| degenerative irregularity throughout the remnant portions of the       |                 |
| labrum.   The biceps labral anchor is intact.   There is thickening    |                 |
| and increased signal  throughout the intra-articular and               |                 |
| extra-articular components of the long head  of the biceps tendon.     |                 |
|   The biceps tendon is in place and the biceps groove.                 |                 |
| Glenohumeral and acromioclavicular joints:     Diffuse moderate        |                 |
| degenerative changes throughout the glenohumeral joint.   Moderate     |                 |
| degenerative changes throughout the acromioclavicular joint.   Fluid   |                 |
| in  the subacromial/subdeltoid bursa appears to communicate with the   |                 |
| acromioclavicular joint consistent with capsular disruption.   The     |                 |
| acromion is a  type II.   There is subacromial enthesopathy.           |                 |
| Other:     There is a moderate-sized joint effusion.     IMPRESSION -  |                 |
|     There is a complete tear of the supraspinatus tendon and near      |                 |
| complete of the  infraspinatus tendon.   This is associated with       |                 |
| severe atrophy of the  supraspinatus muscle and mild to moderate       |                 |
| atrophy of the infraspinatus muscle.     Tendinosis and low to         |                 |
| moderate grade tearing of the subscapularis tendon.     Tendinosis     |                 |
| throughout the long head of the biceps tendon.     Moderate            |                 |
| degenerative changes in the glenohumeral joint.     Moderate           |                 |
| degenerative changes in the acromioclavicular joint.   Prominent       |                 |
| subacromial enthesopathy.     Dictated and Signed by: Brooks Zhang, |                 |
|  MD    Electronically signed: 3/10/2017 3:17 PM                        |                 |
+------------------------------------------------------------------------+-----------------+
 
 
 
+------------------------------------------------------------------------------------------+
| Procedure Note                                                                           |
+------------------------------------------------------------------------------------------+
|   Ralf, Rad Results In - 03/10/2017  3:20 PM PST  EXAM: MRI SHOULDER RIGHT WO CONTRAST    |
| dated 3/10/2017 12:36 PMHISTORY:  right shoulder pain- evaluate for rotator cuff         |
| tearCOMPARISON: Outside radiographs dated 2016.TECHNIQUE: Multiplanar        |
| multisequence MR imaging of the right shoulder withoutcontrast. Imaging is performed on  |
| a 3 Marichuy MRI.FINDINGS: Rotator Cuff:There is a near complete tear of the rotator cuff.  |
|  There is a completefull-thickness tear involving the supraspinatus tendon.  The fibers  |
| areretracted deep to the acromion.  There are very heterogeneous, thickened,irregular.   |
| A significant portion of the infraspinatus tendon appears similarlytorn and retracted.   |
| There is a component of the posterior tendon which isthickened, irregular, but attached  |
| at the humeral insertion.  There is a intactteres minor tendon.  There is tendinosis and |
|  low to moderate grade tearing inthe subscapularis tendon.  There is severe atrophy in   |
| the supraspinatus muscle. There is mild to moderate atrophy in the infraspinatus         |
| muscle.Labrum and biceps tendon:Diffuse degenerative irregularity throughout the remnant |
|  portions of the labrum. The biceps labral anchor is intact.  There is thickening and    |
| increased signalthroughout the intra-articular and extra-articular components of the     |
| long headof the biceps tendon.  The biceps tendon is in place and the biceps groove.     |
| Glenohumeral and acromioclavicular joints:Diffuse moderate degenerative changes          |
| throughout the glenohumeral joint. Moderate degenerative changes throughout the          |
 
| acromioclavicular joint.  Fluid inthe subacromial/subdeltoid bursa appears to            |
| communicate with theacromioclavicular joint consistent with capsular disruption.  The    |
| acromion is atype II.  There is subacromial enthesopathy.Other:There is a moderate-sized |
|  joint effusion.IMPRESSION -There is a complete tear of the supraspinatus tendon and     |
| near complete of theinfraspinatus tendon.  This is associated with severe atrophy of     |
| thesupraspinatus muscle and mild to moderate atrophy of the infraspinatus                |
| muscle.Tendinosis and low to moderate grade tearing of the subscapularis                 |
| tendon.Tendinosis throughout the long head of the biceps tendon.Moderate degenerative    |
| changes in the glenohumeral joint.Moderate degenerative changes in the acromioclavicular |
|  joint.  Prominentsubacromial enthesopathy.Dictated and Signed by: Brooks Zhang MD   |
| Electronically signed: 3/10/2017 3:17 PM                                                 |
|                                                                                          |
|Glenohumeral and acromioclavicular joints:                                                |
|                                                                                          |
|Diffuse moderate degenerative changes throughout the glenohumeral joint.                  |
|Moderate degenerative changes throughout the acromioclavicular joint.  Fluid in           |
|the subacromial/subdeltoid bursa appears to communicate with the                          |
|acromioclavicular joint consistent with capsular disruption.  The acromion is a           |
|type II.  There is subacromial enthesopathy.                                              |
|                                                                                          |
|Other:                                                                                   |
|                                                                                          |
|There is a moderate-sized joint effusion.                                                 |
|                                                                                          |
|IMPRESSION -                                                                              |
|                                                                                          |
|There is a complete tear of the supraspinatus tendon and near complete of the             |
|infraspinatus tendon.  This is associated with severe atrophy of the                      |
|supraspinatus muscle and mild to moderate atrophy of the infraspinatus muscle.            |
|                                                                                          |
|Tendinosis and low to moderate grade tearing of the subscapularis tendon.                 |
|                                                                                          |
|Tendinosis throughout the long head of the biceps tendon.                                 |
|                                                                                          |
|Moderate degenerative changes in the glenohumeral joint.                                  |
|                                                                                          |
|Moderate degenerative changes in the acromioclavicular joint.  Prominent                  |
|subacromial enthesopathy.                                                                 |
|                                                                                          |
|Dictated and Signed by: Brooks Zhang MD                                               |
| Electronically signed: 3/10/2017 3:17 PM                                                 |
+------------------------------------------------------------------------------------------+
 
 
 
+----------------------+---------------------+--------------------+----------------+
| Performing           | Address             | City/State/Zipcode | Phone Number   |
| Organization         |                     |                    |                |
+----------------------+---------------------+--------------------+----------------+
|   [facility]     |   [address] | [place]    |   [phone] |
| [facility]  |                     | [address]              |                |
| - IMAGING            |                     |                    |                |
+----------------------+---------------------+--------------------+----------------+
 documented in this encounter
 
 Visit Diagnoses
 
 
+-------------------------------------------------------------------------+
| Diagnosis                                                               |
 
+-------------------------------------------------------------------------+
|   Chronic right shoulder pain - Primary  Pain in joint, shoulder region |
+-------------------------------------------------------------------------+
 documented in this encounter

## 2020-01-08 NOTE — XMS
Encounter Summary
  Created on: 2020
 
 Viviana Ricci
 External Reference #: [id number]
 : 47
 Sex: Female
 
 Demographics
 
 
+-----------------------+----------------------+
| Address               | [address]      |
|                       | [place]  [address] |
+-----------------------+----------------------+
| Home Phone            | [phone]      |
+-----------------------+----------------------+
| Preferred Language    | Unknown              |
+-----------------------+----------------------+
| Marital Status        | Single               |
+-----------------------+----------------------+
| Mandaen Affiliation | [id number]                 |
+-----------------------+----------------------+
| Race                  | Unknown              |
+-----------------------+----------------------+
| Ethnic Group          | Unknown              |
+-----------------------+----------------------+
 
 
 Author
 
 
+--------------+--------------------------------------------+
| Author       | [facility]  |
|              | and [place]                                |
+--------------+--------------------------------------------+
| Organization | [facility]  |
|              | and [place]                                |
+--------------+--------------------------------------------+
| Address      | Unknown                                    |
+--------------+--------------------------------------------+
| Phone        | Unavailable                                |
+--------------+--------------------------------------------+
 
 
 
 Support
 
 
+----------------+--------------+---------------------+-----------------+
| Name           | Relationship | Address             | Phone           |
+----------------+--------------+---------------------+-----------------+
| Ada/Ed Radhames | ECON         | [address]              | [phone] |
|                |              | [place]  |                 |
|                |              |  [address]              |                 |
+----------------+--------------+---------------------+-----------------+
 
 
 
 
 Care Team Providers
 
 
+------------------------+------+-----------------+
| Care Team Member Name  | Role | Phone           |
+------------------------+------+-----------------+
| Marzena Moser DO | PCP  | [phone] |
+------------------------+------+-----------------+
 
 
 
 Reason for Visit
 
 
+-------------+----------+
| Reason      | Comments |
+-------------+----------+
| Appointment |          |
+-------------+----------+
 
 
 
 Encounter Details
 
 
+--------+-----------+----------------------+---------------------+-------------+
| Date   | Type      | Department           | Care Team           | Description |
+--------+-----------+----------------------+---------------------+-------------+
| 10/14/ | Telephone |   [facility]          |   Heydi Kirkland        | Appointment |
|    |           | PULMONARY  [address]     | MD [address]  |             |
|        |           | [address]  [place], | [address]  [place]    |             |
|        |           |  [place] [address]       | [place] [address]     |             |
|        |           | [phone]         | [phone]        |             |
|        |           |                      | [phone] (Fax)  |             |
+--------+-----------+----------------------+---------------------+-------------+
 
 
 
 Social History
 
 
+--------------+-------+-----------+--------+------+
| Tobacco Use  | Types | Packs/Day | Years  | Date |
|              |       |           | Used   |      |
+--------------+-------+-----------+--------+------+
| Never Smoker |       |           |        |      |
+--------------+-------+-----------+--------+------+
 
 
 
+---------------------+---+---+---+
| Smokeless Tobacco:  |   |   |   |
| Never Used          |   |   |   |
+---------------------+---+---+---+
 
 
 
+---------------------------------------------------------------+
| Comments: some second hand smoke exposure, but fairly minimal |
 
+---------------------------------------------------------------+
 
 
 
+-------------+-------------+---------+----------+
| Alcohol Use | Drinks/Week | oz/Week | Comments |
+-------------+-------------+---------+----------+
| No          |             |         |          |
+-------------+-------------+---------+----------+
 
 
 
+------------------+---------------+
| Sex Assigned at  | Date Recorded |
| Birth            |               |
+------------------+---------------+
| Not on file      |               |
+------------------+---------------+
 
 
 
+----------------+-------------+-------------+
| Job Start Date | Occupation  | Industry    |
+----------------+-------------+-------------+
| Not on file    | Not on file | Not on file |
+----------------+-------------+-------------+
 
 
 
+----------------+--------------+------------+
| Travel History | Travel Start | Travel End |
+----------------+--------------+------------+
 
 
 
+-------------------------------------+
| No recent travel history available. |
+-------------------------------------+
 documented as of this encounter
 
 Plan of Treatment
 
 
+--------+-----------+------------+----------------------+-------------------+
| Date   | Type      | Specialty  | Care Team            | Description       |
+--------+-----------+------------+----------------------+-------------------+
| / | Office    | Cardiology |   Hellberg, Tonia,    |                   |
|    | Visit     |            | ARNJESSICA  [address]   |                   |
|        |           |            | St  [place], [place]  |                   |
|        |           |            | [address]  [phone]  |                   |
|        |           |            |  [phone] (Fax)  |                   |
+--------+-----------+------------+----------------------+-------------------+
| / | Hospital  | Radiology  |   Popeye Townsend, |                   |
|    | Encounter |            |  MD  [address] |                   |
|        |           |            |  St.  [place],   |                   |
|        |           |            | [place] [address]             |                   |
|        |           |            | [phone]         |                   |
|        |           |            | [phone] (Fax)   |                   |
+--------+-----------+------------+----------------------+-------------------+
| / | Surgery   | Radiology  |   Popeye Townsend, | CV EP PPM SYSTEM  |
 
|    |           |            |  MD  [address] | IMPLANT           |
|        |           |            |  St.  [place],   |                   |
|        |           |            | [place] [address]             |                   |
|        |           |            | [phone]         |                   |
|        |           |            | [phone] (Fax)   |                   |
+--------+-----------+------------+----------------------+-------------------+
| 02/10/ | Clinical  | Cardiology |                      |                   |
|    | Support   |            |                      |                   |
+--------+-----------+------------+----------------------+-------------------+
| / | Office    | Cardiology |   Tonia Wilson,    |                   |
|    | Visit     |            | BELKIS  [address]   |                   |
|        |           |            | [place]  |                   |
|        |           |            | [address]  [phone]  |                   |
|        |           |            |  [phone] (Fax)  |                   |
+--------+-----------+------------+----------------------+-------------------+
| / | Off-Site  | Nephrology |   Marzena Moser  |                   |
|    | Visit     |            | DO ETIENNE  [address] [place] [address]      |                   |
|        |           |            | [address]      |                   |
|        |           |            | [place]      |                   |
|        |           |            | [address] [phone]  |                   |
|        |           |            |  [phone] (Fax)  |                   |
+--------+-----------+------------+----------------------+-------------------+
 documented as of this encounter
 
 Visit Diagnoses
 Not on filedocumented in this encounter

## 2020-01-08 NOTE — XMS
Encounter Summary
  Created on: 2020
 
 Ras Hardy
 External Reference #: [id number]
 : 47
 Sex: Female
 
 Demographics
 
 
+-----------------------+----------------------+
| Address               | [address]      |
|                       | [place]  [address] |
+-----------------------+----------------------+
| Home Phone            | [phone]      |
+-----------------------+----------------------+
| Preferred Language    | Unknown              |
+-----------------------+----------------------+
| Marital Status        | Single               |
+-----------------------+----------------------+
| Islam Affiliation | [id number]                 |
+-----------------------+----------------------+
| Race                  | Unknown              |
+-----------------------+----------------------+
| Ethnic Group          | Unknown              |
+-----------------------+----------------------+
 
 
 Author
 
 
+--------------+--------------------------------------------+
| Author       | [facility]  |
|              | and [place]                                |
+--------------+--------------------------------------------+
| Organization | [facility]  |
|              | and [place]                                |
+--------------+--------------------------------------------+
| Address      | Unknown                                    |
+--------------+--------------------------------------------+
| Phone        | Unavailable                                |
+--------------+--------------------------------------------+
 
 
 
 Support
 
 
+----------------+--------------+---------------------+-----------------+
| Name           | Relationship | Address             | Phone           |
+----------------+--------------+---------------------+-----------------+
| Ada/Ed Radhames | ECON         | [place]              | [phone] |
|                |              | [place], [place]  |                 |
|                |              |  [address]              |                 |
+----------------+--------------+---------------------+-----------------+
 
 
 
 
 Care Team Providers
 
 
+-----------------------+------+-------------+
| Care Team Member Name | Role | Phone       |
+-----------------------+------+-------------+
 PCP  | Unavailable |
+-----------------------+------+-------------+
 
 
 
 Encounter Details
 
 
+--------+-------------+----------------------+----------------+----------------------+
| Date   | Type        | Department           | Care Team      | Description          |
+--------+-------------+----------------------+----------------+----------------------+
| / | Orders Only |   [facility] [place]          |   Offenstein,  | Shortness of breath  |
|    |             | PULMONARY  [address]     | Nena GUSMAN MD  | (Primary Dx); Cough  |
|        |             | [address]  [place], |                |                      |
|        |             |  [place] [address]       |                |                      |
|        |             | [phone]         |                |                      |
+--------+-------------+----------------------+----------------+----------------------+
 
 
 
 Social History
 
 
+--------------+-------+-----------+--------+------+
| Tobacco Use  | Types | Packs/Day | Years  | Date |
|              |       |           | Used   |      |
+--------------+-------+-----------+--------+------+
| Never Smoker |       |           |        |      |
+--------------+-------+-----------+--------+------+
 
 
 
+---------------------+---+---+---+
| Smokeless Tobacco:  |   |   |   |
| Never Used          |   |   |   |
+---------------------+---+---+---+
 
 
 
+---------------------------------------------------------------+
| Comments: some second hand smoke exposure, but fairly minimal |
+---------------------------------------------------------------+
 
 
 
+-------------+-------------+---------+----------+
| Alcohol Use | Drinks/Week | oz/Week | Comments |
+-------------+-------------+---------+----------+
| No          |             |         |          |
+-------------+-------------+---------+----------+
 
 
 
 
+------------------+---------------+
| Sex Assigned at  | Date Recorded |
| Birth            |               |
+------------------+---------------+
| Not on file      |               |
+------------------+---------------+
 
 
 
+----------------+-------------+-------------+
| Job Start Date | Occupation  | Industry    |
+----------------+-------------+-------------+
| Not on file    | Not on file | Not on file |
+----------------+-------------+-------------+
 
 
 
+----------------+--------------+------------+
| Travel History | Travel Start | Travel End |
+----------------+--------------+------------+
 
 
 
+-------------------------------------+
| No recent travel history available. |
+-------------------------------------+
 documented as of this encounter
 
 Plan of Treatment
 
 
+--------+-----------+------------+----------------------+-------------------+
| Date   | Type      | Specialty  | Care Team            | Description       |
+--------+-----------+------------+----------------------+-------------------+
| / | Office    | Cardiology |   Tonia Wilson,    |                   |
|    | Visit     |            | [address]   |                   |
|        |           |            | [address] [place] [facility], [place]  |                   |
|        |           |            | [id number] [phone]  |                   |
|        |           |            |  [phone] (Fax)  |                   |
+--------+-----------+------------+----------------------+-------------------+
| / | Hospital  | Radiology  |   Popeye Townsend, |                   |
|    | Encounter |            |  MD  [address] |                   |
|        |           |            |  St.  [place],   |                   |
|        |           |            | [place] [address]             |                   |
|        |           |            | [phone]         |                   |
|        |           |            | [phone] (Fax)   |                   |
+--------+-----------+------------+----------------------+-------------------+
| / | Surgery   | Radiology  |   Popeye Townsend, | CV EP PPM SYSTEM  |
|    |           |            |  MD  [address] | IMPLANT           |
|        |           |            |  St.  [place],   |                   |
|        |           |            | [place] [address]             |                   |
|        |           |            | [phone]         |                   |
|        |           |            | [phone] (Fax)   |                   |
+--------+-----------+------------+----------------------+-------------------+
| 02/10/ | Clinical  | Cardiology |                      |                   |
|    | Support   |            |                      |                   |
+--------+-----------+------------+----------------------+-------------------+
| / | Office    | Cardiology |   Tonia Wilson,    |                   |
|    | Visit     |            | ARNJESSICA  [address]   |                   |
|        |           |            | St  [place], [place]  |                   |
 
|        |           |            | [address]  [phone]  |                   |
|        |           |            |  [phone] (Fax)  |                   |
+--------+-----------+------------+----------------------+-------------------+
| / | Off-Site  | Nephrology |   Marzena Moser  |                   |
|    | Visit     |            | DO ETIENNE  [address]      |                   |
|        |           |            | [address]      |                   |
|        |           |            | [place]      |                   |
|        |           |            | [address]  [phone]  |                   |
|        |           |            |  [phone] (Fax)  |                   |
+--------+-----------+------------+----------------------+-------------------+
 documented as of this encounter
 
 Results
 XR Chest PA and Lateral (2013  1:17 PM PDT)
 
+----------+
| Specimen |
+----------+
|          |
+----------+
 
 
 
+------------------------------------------------------------------------+-----------------+
| Narrative                                                              | Performed At    |
+------------------------------------------------------------------------+-----------------+
|    [facility]      Diagnostic Imaging          |   [place]    |
| Department      [address] [place] [place]      [phone]    | [facility]        |
|                             [   rep ct [address]]     [   rep ct [facility]  |
| st zip]     **** Signed ****                                           | - IMAGING       |
| ______________________________________________________________________ |                 |
| ______________________     Patient Name: RAS HARDY PITA   Physician:     |                 |
| OFFE.     : 1945    Age: 67   Sex: F     Unit #: [id number]    |                 |
|   Exam Date: 13     Acct #: [id number]   Location: [facility]         |                 |
| Report #: [id number]                      Page:                         |                 |
|   %(RAD)RES..mtdd.print.filter("pg") of   %(RAD)                       |                 |
| RES..mtdd.print.filter("tpg")                                          |                 |
| ______________________________________________________________________ |                 |
| ______________________     Accession Number: [id number]                |                 |
| CHEST X-RAY               CLINICAL HISTORY:   INCREASED SHORTNESS OF   |                 |
| BREATH AND COUGH.               COMPARISON:   Numerous previous chest  |                 |
| x-rays, most recently 2013.               FINDINGS:   PA and     |                 |
| lateral views of the chest were obtained. Sternotomy wires are         |                 |
| present. There is   mild scarring of the bilateral lung bases.         |                 |
| Bilateral lungs are otherwise clear. Heart is enlarged.   There is     |                 |
| atherosclerosis of the aorta. Moderate spondylosis is visualized of    |                 |
| the thoracic spine.               IMPRESSION:       1.   NO ACUTE      |                 |
| FINDINGS.               2.   CARDIOMEGALY.              3.   PREVIOUS  |                 |
| STERNOTOMY.              Dictated Date/Time:   2013 13:17        |                 |
| Transcribed Date/Time:   2013 13:19      :       |                 |
|                         <<Signature on File>>                     |                 |
| -----------------------------------------------------------     Derek  |                 |
| MD Derek13 [id number]     <Electronically signed by Derek Reed MD>     |                 |
|            Derek Reed MD 13 1317     : Rich  |                 |
| [id number] 1319               Nena Boykin MD      |                 |
|                                                                        |                 |
+------------------------------------------------------------------------+-----------------+
 
 
 
 
+----------------------+---------------------+--------------------+----------------+
| Performing           | Address             | City/State/Zipcode | Phone Number   |
| Organization         |                     |                    |                |
+----------------------+---------------------+--------------------+----------------+
|   [facility]     |   [address] | [place]    |   [phone] |
| [facility]  |                     | [address]              |                |
| - IMAGING            |                     |                    |                |
+----------------------+---------------------+--------------------+----------------+
 B Type Natriuretic Peptide (2013 10:01 AM PDT)
 
+-----------+--------------------------+------------+-------------+--------------+
| Component | Value                    | Ref Range  | Performed   | Pathologist  |
|           |                          |            | At          | Signature    |
+-----------+--------------------------+------------+-------------+--------------+
| BNP       | 164 (H)Comment: Testing  | <100 pg/mL | [facility]  |              |
|           | performed on the [facility] |            | [facility]    |              |
|           |  [facility]          |            | MEDICAL     |              |
|           | Analyzer.                |            | CENTER -    |              |
|           |                          |            | LABORATORY  |              |
+-----------+--------------------------+------------+-------------+--------------+
 
 
 
+-----------------+
| Specimen        |
+-----------------+
| Blood specimen  |
| (specimen)      |
+-----------------+
 
 
 
+----------------------+--------------------+--------------------+----------------+
| Performing           | Address            | City/State/Zipcode | Phone Number   |
| Organization         |                    |                    |                |
+----------------------+--------------------+--------------------+----------------+
|   [facility]     |   [address] | [place], [place]    |   [phone] |
| [facility]  |                    | [address]              |                |
| - LABORATORY         |                    |                    |                |
+----------------------+--------------------+--------------------+----------------+
|   [facility]     |   [address] | [place], [place]    |                |
| [facility]  |                    | [address]              |                |
| - LABORATORY         |                    |                    |                |
+----------------------+--------------------+--------------------+----------------+
 documented in this encounter
 
 Visit Diagnoses
 
 
+---------------------------------+
| Diagnosis                       |
+---------------------------------+
|   Shortness of breath - Primary |
+---------------------------------+
|   Cough                         |
+---------------------------------+
 documented in this encounter

## 2020-01-08 NOTE — XMS
Encounter Summary
  Created on: 2020
 
 Viviana Ricci
 External Reference #: [id number]
 : 47
 Sex: Female
 
 Demographics
 
 
+-----------------------+----------------------+
| Address               | [address]      |
|                       | [place]  [address] |
+-----------------------+----------------------+
| Home Phone            | [phone]      |
+-----------------------+----------------------+
| Preferred Language    | Unknown              |
+-----------------------+----------------------+
| Marital Status        | Single               |
+-----------------------+----------------------+
| Baptism Affiliation | 1009                 |
+-----------------------+----------------------+
| Race                  | Unknown              |
+-----------------------+----------------------+
| Ethnic Group          | Unknown              |
+-----------------------+----------------------+
 
 
 Author
 
 
+--------------+--------------------------------------------+
| Author       | [facility]  |
|              | and [place]                                |
+--------------+--------------------------------------------+
| Organization | [facility]  |
|              | and [place]                                |
+--------------+--------------------------------------------+
| Address      | Unknown                                    |
+--------------+--------------------------------------------+
| Phone        | Unavailable                                |
+--------------+--------------------------------------------+
 
 
 
 Support
 
 
+----------------+--------------+---------------------+-----------------+
| Name           | Relationship | Address             | Phone           |
+----------------+--------------+---------------------+-----------------+
| Ada/Ed Radhames | ECON         | [place]              | [phone] |
|                |              | [place]  |                 |
|                |              |  [address]              |                 |
+----------------+--------------+---------------------+-----------------+
 
 
 
 
 Care Team Providers
 
 
+-----------------------+------+-------------+
| Care Team Member Name | Role | Phone       |
+-----------------------+------+-------------+
 PCP  | Unavailable |
+-----------------------+------+-------------+
 
 
 
 Reason for Visit
 
 
+-------------------+----------+
| Reason            | Comments |
+-------------------+----------+
| Medication Refill |          |
+-------------------+----------+
 
 
 
 Encounter Details
 
 
+--------+--------+---------------------+----------------------+-------------------+
| Date   | Type   | Department          | Care Team            | Description       |
+--------+--------+---------------------+----------------------+-------------------+
| / | Refill |   [place]         |   Marzena Moser  | Medication Refill |
|    |        | NEPHROLOGY  [address]   | M, DO  [address]      |                   |
|        |        | [address]   | [address]      |                   |
|        |        | [place], [place]     | [place], [place]      |                   |
|        |        | [address]          | [address]  [phone]  |                   |
|        |        | [phone]        |  [phone] (Fax)  |                   |
+--------+--------+---------------------+----------------------+-------------------+
 
 
 
 Social History
 
 
+--------------+-------+-----------+--------+------+
| Tobacco Use  | Types | Packs/Day | Years  | Date |
|              |       |           | Used   |      |
+--------------+-------+-----------+--------+------+
| Never Smoker |       |           |        |      |
+--------------+-------+-----------+--------+------+
 
 
 
+---------------------+---+---+---+
| Smokeless Tobacco:  |   |   |   |
| Never Used          |   |   |   |
+---------------------+---+---+---+
 
 
 
+---------------------------------------------------------------+
| Comments: some second hand smoke exposure, but fairly minimal |
 
+---------------------------------------------------------------+
 
 
 
+-------------+-------------+---------+----------+
| Alcohol Use | Drinks/Week | oz/Week | Comments |
+-------------+-------------+---------+----------+
| No          |             |         |          |
+-------------+-------------+---------+----------+
 
 
 
+------------------+---------------+
| Sex Assigned at  | Date Recorded |
| Birth            |               |
+------------------+---------------+
| Not on file      |               |
+------------------+---------------+
 
 
 
+----------------+-------------+-------------+
| Job Start Date | Occupation  | Industry    |
+----------------+-------------+-------------+
| Not on file    | Not on file | Not on file |
+----------------+-------------+-------------+
 
 
 
+----------------+--------------+------------+
| Travel History | Travel Start | Travel End |
+----------------+--------------+------------+
 
 
 
+-------------------------------------+
| No recent travel history available. |
+-------------------------------------+
 documented as of this encounter
 
 Plan of Treatment
 
 
+--------+-----------+------------+----------------------+-------------------+
| Date   | Type      | Specialty  | Care Team            | Description       |
+--------+-----------+------------+----------------------+-------------------+
| / | Office    | Cardiology |   HellalfredoTonia,    |                   |
|    | Visit     |            | ARNP  [address]   |                   |
|        |           |            | St  [place], [place]  |                   |
|        |           |            | [address]  [phone]  |                   |
|        |           |            |  [phone] (Fax)  |                   |
+--------+-----------+------------+----------------------+-------------------+
| / | Hospital  | Radiology  |   Popeye Townsend, |                   |
|    | Encounter |            |  MD  [address] |                   |
|        |           |            |  St.  [place],   |                   |
|        |           |            | [place] [address]             |                   |
|        |           |            | [phone]         |                   |
|        |           |            | [phone] (Fax)   |                   |
+--------+-----------+------------+----------------------+-------------------+
| / | Surgery   | Radiology  |   Popeye Townsend, | CV EP PPM SYSTEM  |
 
|    |           |            |  MD  [address] | IMPLANT           |
|        |           |            |  St.  [place],   |                   |
|        |           |            | [place] [address]             |                   |
|        |           |            | [phone]         |                   |
|        |           |            | [phone] (Fax)   |                   |
+--------+-----------+------------+----------------------+-------------------+
| 02/10/ | Clinical  | Cardiology |                      |                   |
|    | Support   |            |                      |                   |
+--------+-----------+------------+----------------------+-------------------+
| / | Office    | Cardiology |   Tonia Wilson,    |                   |
|    | Visit     |            | ARNP  [address]   |                   |
|        |           |            | [place]  |                   |
|        |           |            | [address]  [phone]  |                   |
|        |           |            |  [phone] (Fax)  |                   |
+--------+-----------+------------+----------------------+-------------------+
| / | Off-Site  | Nephrology |   Marzena Moser  |                   |
|    | Visit     |            | DO ETIENNE  [address] [place] [address]      |                   |
|        |           |            | [address]      |                   |
|        |           |            | [place]      |                   |
|        |           |            | [address]  [phone]  |                   |
|        |           |            |  [phone] (Fax)  |                   |
+--------+-----------+------------+----------------------+-------------------+
 documented as of this encounter
 
 Visit Diagnoses
 Not on filedocumented in this encounter

## 2020-01-08 NOTE — XMS
Encounter Summary
  Created on: 2020
 
 Viviana Ricci
 External Reference #: [id number]
 : 47
 Sex: Female
 
 Demographics
 
 
+-----------------------+----------------------+
| Address               | [address]      |
|                       | [place]  [address] |
+-----------------------+----------------------+
| Home Phone            | [phone]      |
+-----------------------+----------------------+
| Preferred Language    | Unknown              |
+-----------------------+----------------------+
| Marital Status        | Single               |
+-----------------------+----------------------+
| Christian Affiliation | [id number]                 |
+-----------------------+----------------------+
| Race                  | Unknown              |
+-----------------------+----------------------+
| Ethnic Group          | Unknown              |
+-----------------------+----------------------+
 
 
 Author
 
 
+--------------+--------------------------------------------+
| Author       | [facility]  |
|              | and [facility]                                |
+--------------+--------------------------------------------+
| Organization | [facility]  |
|              | and [facility]                                |
+--------------+--------------------------------------------+
| Address      | Unknown                                    |
+--------------+--------------------------------------------+
| Phone        | Unavailable                                |
+--------------+--------------------------------------------+
 
 
 
 Support
 
 
+----------------+--------------+---------------------+-----------------+
| Name           | Relationship | Address             | Phone           |
+----------------+--------------+---------------------+-----------------+
| Ada/Ed Radhames | ECON         | [address]              | [phone] |
|                |              | [place]  |                 |
|                |              |  [id number]              |                 |
+----------------+--------------+---------------------+-----------------+
 
 
 
 
 Care Team Providers
 
 
+------------------------+------+-----------------+
| Care Team Member Name  | Role | Phone           |
+------------------------+------+-----------------+
| Marzena Moser DO | PCP  | [phone] |
+------------------------+------+-----------------+
 
 
 
 Reason for Visit
 
 
+-------------------+----------+
| Reason            | Comments |
+-------------------+----------+
| Medication Refill |          |
+-------------------+----------+
 
 
 
 Encounter Details
 
 
+--------+--------+---------------------+----------------------+-------------------+
| Date   | Type   | Department          | Care Team            | Description       |
+--------+--------+---------------------+----------------------+-------------------+
| 10/25/ | Refill |   [facility]         |   Marzena Moser  | Medication Refill |
| 2017   |        | NEPHROLOGY  [address]   | M, DO  [address]      |                   |
|        |        | [address]   | [address]      |                   |
|        |        | [place], [place]     | [place], [place]      |                   |
|        |        | [address]          | [address]  [phone]  |                   |
|        |        | [phone]        |  [phone] (Fax)  |                   |
+--------+--------+---------------------+----------------------+-------------------+
 
 
 
 Social History
 
 
+--------------+-------+-----------+--------+------+
| Tobacco Use  | Types | Packs/Day | Years  | Date |
|              |       |           | Used   |      |
+--------------+-------+-----------+--------+------+
| Never Smoker |       |           |        |      |
+--------------+-------+-----------+--------+------+
 
 
 
+---------------------+---+---+---+
| Smokeless Tobacco:  |   |   |   |
| Never Used          |   |   |   |
+---------------------+---+---+---+
 
 
 
+---------------------------------------------------------------+
| Comments: some second hand smoke exposure, but fairly minimal |
 
+---------------------------------------------------------------+
 
 
 
+-------------+-------------+---------+----------+
| Alcohol Use | Drinks/Week | oz/Week | Comments |
+-------------+-------------+---------+----------+
| No          |             |         |          |
+-------------+-------------+---------+----------+
 
 
 
+------------------+---------------+
| Sex Assigned at  | Date Recorded |
| Birth            |               |
+------------------+---------------+
| Not on file      |               |
+------------------+---------------+
 
 
 
+----------------+-------------+-------------+
| Job Start Date | Occupation  | Industry    |
+----------------+-------------+-------------+
| Not on file    | Not on file | Not on file |
+----------------+-------------+-------------+
 
 
 
+----------------+--------------+------------+
| Travel History | Travel Start | Travel End |
+----------------+--------------+------------+
 
 
 
+-------------------------------------+
| No recent travel history available. |
+-------------------------------------+
 documented as of this encounter
 
 Plan of Treatment
 
 
+--------+-----------+------------+----------------------+-------------------+
| Date   | Type      | Specialty  | Care Team            | Description       |
+--------+-----------+------------+----------------------+-------------------+
| / | Office    | Cardiology |   Tonia Wilson,    |                   |
|    | Visit     |            | ARNP  [address]   |                   |
|        |           |            | [place], [place]  |                   |
|        |           |            | [address]  [phone]  |                   |
|        |           |            |  [phone] (Fax)  |                   |
+--------+-----------+------------+----------------------+-------------------+
| / | Hospital  | Radiology  |   Popeye Townsend, |                   |
|    | Encounter |            |  MD  [address] |                   |
|        |           |            |  [place],   |                   |
|        |           |            | [place] [address]             |                   |
|        |           |            | [phone]         |                   |
|        |           |            | [phone] (Fax)   |                   |
+--------+-----------+------------+----------------------+-------------------+
| / | Surgery   | Radiology  |   Popeye Townsend, | CV EP PPM SYSTEM  |
 
|    |           |            |  MD  [address] | IMPLANT           |
|        |           |            |  [place],   |                   |
|        |           |            | [place] [address]             |                   |
|        |           |            | [phone]         |                   |
|        |           |            | [phone] (Fax)   |                   |
+--------+-----------+------------+----------------------+-------------------+
| 02/10/ | Clinical  | Cardiology |                      |                   |
|    | Support   |            |                      |                   |
+--------+-----------+------------+----------------------+-------------------+
| / | Office    | Cardiology |   RakeshTonia andre,    |                   |
|    | Visit     |            | ARNP  [address]   |                   |
|        |           |            | [place]  |                   |
|        |           |            | [address] [phone]  |                   |
|        |           |            |  [phone] (Fax)  |                   |
+--------+-----------+------------+----------------------+-------------------+
| / | Off-Site  | Nephrology |   Marzena Moser  |                   |
|    | Visit     |            | DO ETIENNE  [address] [place] [address]      |                   |
|        |           |            | [address]      |                   |
|        |           |            | [place]      |                   |
|        |           |            | [address] [phone]  |                   |
|        |           |            |  [phone] (Fax)  |                   |
+--------+-----------+------------+----------------------+-------------------+
 documented as of this encounter
 
 Visit Diagnoses
 Not on filedocumented in this encounter

## 2020-01-08 NOTE — XMS
Encounter Summary
  Created on: 2020
 
 Viviana Ricci
 External Reference #: [id number]
 : 47
 Sex: Female
 
 Demographics
 
 
+-----------------------+----------------------+
| Address               | [address]      |
|                       | [place]  [address] |
+-----------------------+----------------------+
| Home Phone            | [phone]      |
+-----------------------+----------------------+
| Preferred Language    | Unknown              |
+-----------------------+----------------------+
| Marital Status        | Single               |
+-----------------------+----------------------+
| Tenriism Affiliation | [id number]                 |
+-----------------------+----------------------+
| Race                  | Unknown              |
+-----------------------+----------------------+
| Ethnic Group          | Unknown              |
+-----------------------+----------------------+
 
 
 Author
 
 
+--------------+--------------------------------------------+
| Author       | [facility]  |
|              | and [facility]                                |
+--------------+--------------------------------------------+
| Organization | [facility]  |
|              | and [facility]                                |
+--------------+--------------------------------------------+
| Address      | Unknown                                    |
+--------------+--------------------------------------------+
| Phone        | Unavailable                                |
+--------------+--------------------------------------------+
 
 
 
 Support
 
 
+----------------+--------------+---------------------+-----------------+
| Name           | Relationship | Address             | Phone           |
+----------------+--------------+---------------------+-----------------+
| Ada/Ed Radhames | ECON         | [place]              | [phone] |
|                |              | [place], [place]  |                 |
|                |              |  [id number]              |                 |
+----------------+--------------+---------------------+-----------------+
 
 
 
 
 Care Team Providers
 
 
+-----------------------+------+-------------+
| Care Team Member Name | Role | Phone       |
+-----------------------+------+-------------+
 PCP  | Unavailable |
+-----------------------+------+-------------+
 
 
 
 Encounter Details
 
 
+--------+-----------+----------------------+----------------+-------------+
| Date   | Type      | Department           | Care Team      | Description |
+--------+-----------+----------------------+----------------+-------------+
| 04/15/ | [facility]  |   [facility] |   [place],  | Cough;      |
|    | Encounter |  [facility]   | Nena GUSMAN MD  | Dyspnea     |
|        |           | FUNCTION  [address]      |                |             |
|        |           | [place], |                |             |
|        |           |  [place] [address]       |                |             |
|        |           | [phone]         |                |             |
+--------+-----------+----------------------+----------------+-------------+
 
 
 
 Social History
 
 
+--------------+-------+-----------+--------+------+
| Tobacco Use  | Types | Packs/Day | Years  | Date |
|              |       |           | Used   |      |
+--------------+-------+-----------+--------+------+
| Never Smoker |       |           |        |      |
+--------------+-------+-----------+--------+------+
 
 
 
+---------------------+---+---+---+
| Smokeless Tobacco:  |   |   |   |
| Never Used          |   |   |   |
+---------------------+---+---+---+
 
 
 
+---------------------------------------------------------------+
| Comments: some second hand smoke exposure, but fairly minimal |
+---------------------------------------------------------------+
 
 
 
+-------------+-------------+---------+----------+
| Alcohol Use | Drinks/Week | oz/Week | Comments |
+-------------+-------------+---------+----------+
| No          |             |         |          |
+-------------+-------------+---------+----------+
 
 
 
 
+------------------+---------------+
| Sex Assigned at  | Date Recorded |
| Birth            |               |
+------------------+---------------+
| Not on file      |               |
+------------------+---------------+
 
 
 
+----------------+-------------+-------------+
| Job Start Date | Occupation  | Industry    |
+----------------+-------------+-------------+
| Not on file    | Not on file | Not on file |
+----------------+-------------+-------------+
 
 
 
+----------------+--------------+------------+
| Travel History | Travel Start | Travel End |
+----------------+--------------+------------+
 
 
 
+-------------------------------------+
| No recent travel history available. |
+-------------------------------------+
 documented as of this encounter
 
 Medications at Time of Discharge
 
 
+----------------------+----------------------+-----------+---------+----------+-----------+
| Medication           | Sig                  | Dispensed | Refills | Start    | End Date  |
|                      |                      |           |         | Date     |           |
+----------------------+----------------------+-----------+---------+----------+-----------+
|   cholecalciferol    | Take 2,000 Units by  |           | 0       |          |           |
| (VITAMIN D-3) 2000   | mouth Every other    |           |         |          |           |
| UNITS                | day.                 |           |         |          |           |
| TABSIndications:     |                      |           |         |          |           |
| Unspecified          |                      |           |         |          |           |
| hypertensive kidney  |                      |           |         |          |           |
| disease with chronic |                      |           |         |          |           |
|  kidney disease      |                      |           |         |          |           |
| stage I through      |                      |           |         |          |           |
| stage IV, or         |                      |           |         |          |           |
| unspecified(403.90), |                      |           |         |          |           |
|  FSGS (focal         |                      |           |         |          |           |
| segmental            |                      |           |         |          |           |
| glomerulosclerosis), |                      |           |         |          |           |
|  Hyperlipidemia,     |                      |           |         |          |           |
| Complications of     |                      |           |         |          |           |
| transplanted kidney  |                      |           |         |          |           |
+----------------------+----------------------+-----------+---------+----------+-----------+
|   glucose blood VI   | Check blood sugar    |   100     | 12      | 20 |           |
| test strips (ONE     | before each meal and | each      |         | 12       |           |
| TOUCH ULTRA TEST)    |  as directed         |           |         |          |           |
| stripIndications:    |                      |           |         |          |           |
| Unspecified          |                      |           |         |          |           |
| hypertensive kidney  |                      |           |         |          |           |
 
| disease with chronic |                      |           |         |          |           |
|  kidney disease      |                      |           |         |          |           |
| stage I through      |                      |           |         |          |           |
| stage IV, or         |                      |           |         |          |           |
| unspecified(403.90), |                      |           |         |          |           |
|  Complications of    |                      |           |         |          |           |
| transplanted kidney, |                      |           |         |          |           |
|  Diabetes mellitus   |                      |           |         |          |           |
| type II,             |                      |           |         |          |           |
| uncontrolled (HCC),  |                      |           |         |          |           |
| Hyperlipidemia       |                      |           |         |          |           |
+----------------------+----------------------+-----------+---------+----------+-----------+
|   Respiratory        | Decrease CPAP to     |   1 each  | 0       | 20 |           |
| Therapy Supplies     | 12-18 cmH2O          |           |         | 13       |           |
| MISC                 | Diagnosis            |           |         |          |           |
|                      | Code(s)327.23.       |           |         |          |           |
|                      | Please send order to |           |         |          |           |
|                      |  [facility]     |           |         |          |           |
+----------------------+----------------------+-----------+---------+----------+-----------+
|   allopurinol        | Take 1 tablet by     |   30      | 11      | 02/10/20 |  |
| (ZYLOPRIM) 100 mg    | mouth Daily.         | tablet    |         | 14       | 5         |
| tablet               |                      |           |         |          |           |
+----------------------+----------------------+-----------+---------+----------+-----------+
|   aspirin 81 MG EC   | Take 81 mg by mouth  |           | 0       | 20 |  |
| tablet               | Daily.               |           |         | 12       | 5         |
+----------------------+----------------------+-----------+---------+----------+-----------+
|   cinacalcet         | Take 1 tablet by     |   30      | 12      | 20 |  |
| (SENSIPAR) 30 mg     | mouth Daily.         | tablet    |         | 13       | 4         |
| tablet               |                      |           |         |          |           |
+----------------------+----------------------+-----------+---------+----------+-----------+
|   fludrocortisone    | Take 1 tablet by     |   45      | 2       | 20 |  |
| (FLORINEF) 0.1 mg    | mouth Every other    | tablet    |         | 14       | 5         |
| tablet               | day.                 |           |         |          |           |
+----------------------+----------------------+-----------+---------+----------+-----------+
|   fluticasone        | Inhale 1 puff into   |   1       | 11      | 20 |  |
| (FLOVENT HFA) 220    | the lungs 2 times    | Inhaler   |         | 13       | 5         |
| mcg/puff inhaler     | daily. Rinse mouth   |           |         |          |           |
|                      | after use.           |           |         |          |           |
+----------------------+----------------------+-----------+---------+----------+-----------+
|   furosemide (LASIX) | Take 1 tablet by     |   30      | 5       | 20 |  |
|  40 mg tablet        | mouth Daily.         | tablet    |         | 13       | 4         |
+----------------------+----------------------+-----------+---------+----------+-----------+
|   insulin glargine   | Inject 20 Units      |   10 mL   | 0       | 20 |  |
| (LANTUS) 100         | under the skin every |           |         | 13       | 4         |
| units/mL             |  morning.            |           |         |          |           |
| injectionIndications |                      |           |         |          |           |
| : Unspecified        |                      |           |         |          |           |
| hypertensive kidney  |                      |           |         |          |           |
| disease with chronic |                      |           |         |          |           |
|  kidney disease      |                      |           |         |          |           |
| stage I through      |                      |           |         |          |           |
| stage IV, or         |                      |           |         |          |           |
| unspecified(403.90), |                      |           |         |          |           |
|  Complications of    |                      |           |         |          |           |
| transplanted kidney, |                      |           |         |          |           |
|  Diabetes mellitus   |                      |           |         |          |           |
| type II,             |                      |           |         |          |           |
| uncontrolled (HCC),  |                      |           |         |          |           |
| Hyperlipidemia       |                      |           |         |          |           |
+----------------------+----------------------+-----------+---------+----------+-----------+
 
|   insulin lispro     | Inject               |   10 pen  | 5       | 20 |  |
| (HUMALOG) 100        | subcutaneously       |           |         | 13       | 4         |
| units/mL injection   | before meals         |           |         |          |           |
|                      | according to sliding |           |         |          |           |
|                      |  scale               |           |         |          |           |
+----------------------+----------------------+-----------+---------+----------+-----------+
|   Insulin            | Use before meals and |   100     | 11      | 20 |  |
| Syringe-Needle U-100 |  as directed.        | each      |         | 13       | 4         |
|  (BD INSULIN SYRINGE |                      |           |         |          |           |
|  ULTRAFINE) 31G X    |                      |           |         |          |           |
| 16" 0.5 ML MISC    |                      |           |         |          |           |
+----------------------+----------------------+-----------+---------+----------+-----------+
|   levothyroxine      | Take 1 tablet by     |   30      | 11      | 20 | 10/06/201 |
| (LEVOTHROID) 50 mcg  | mouth Daily.         | tablet    |         | 13       | 4         |
| tablet               |                      |           |         |          |           |
+----------------------+----------------------+-----------+---------+----------+-----------+
|   lisinopril         | Take 1 tablet by     |   90      | 3       | 20 |  |
| (PRINIVIL,ZESTRIL)   | mouth Daily.         | tablet    |         | 13       | 4         |
| 30 MG tablet         |                      |           |         |          |           |
+----------------------+----------------------+-----------+---------+----------+-----------+
|   loperamide         | Take 2 mg by mouth   |           | 0       | 20 |  |
| (ANTI-DIARRHEAL) 2   | Daily as needed.     |           |         | 12       | 4         |
| mg capsule           |                      |           |         |          |           |
+----------------------+----------------------+-----------+---------+----------+-----------+
|   magnesium oxide    | Take 1 tablet by     |   62      | 12      | 20 |  |
| (MAG-OX) 400 mg      | mouth 2 times daily. | tablet    |         | 13       | 4         |
| tablet               |                      |           |         |          |           |
+----------------------+----------------------+-----------+---------+----------+-----------+
|   metoprolol         | Take 1 tablet by     |   60      | 11      | 20 |  |
| tartrate (LOPRESSOR) | mouth 2 times daily. | tablet    |         | 13       | 4         |
|  25 mg tablet        |                      |           |         |          |           |
+----------------------+----------------------+-----------+---------+----------+-----------+
|   mycophenolate      | Take 250 mg by mouth |           | 0       | 20 | 10/14/201 |
| (CELLCEPT) 250 mg    |  3 times daily.      |           |         | 11       | 5         |
| capsule              |                      |           |         |          |           |
+----------------------+----------------------+-----------+---------+----------+-----------+
|   omeprazole         | Take one capsule by  |   90      | 3       | 20 |  |
| (PRILOSEC) 20 mg     | mouth once daily on  | capsule   |         | 13       | 4         |
| capsule              | an empty stomach     |           |         |          |           |
+----------------------+----------------------+-----------+---------+----------+-----------+
|   predniSONE         | Take 1 tablet by     |   90      | 3       | 20 |  |
| (DELTASONE) 5 mg     | mouth Daily.         | tablet    |         | 14       | 5         |
| tablet               |                      |           |         |          |           |
+----------------------+----------------------+-----------+---------+----------+-----------+
|   Prenatal           | Take 0.8 mg by mouth |   30 each | 11      | 20 |  |
| Multivit-Min-Fe-FA   |  Daily.              |           |         | 13       | 4         |
| (PRENATAL VITAMINS)  |                      |           |         |          |           |
| 0.8 MG TABS          |                      |           |         |          |           |
+----------------------+----------------------+-----------+---------+----------+-----------+
|   Prenatal Vit-Fe    |                      |           | 0       | 20 |  |
| Fumarate-FA (PNV     |                      |           |         | 13       | 4         |
| PRENATAL PLUS        |                      |           |         |          |           |
| MULTIVITAMIN) 27-1   |                      |           |         |          |           |
| MG TABS              |                      |           |         |          |           |
+----------------------+----------------------+-----------+---------+----------+-----------+
|   rosuvastatin       | Take 1 tablet by     |   30      | 11      | 20 |  |
| (CRESTOR) 20 mg      | mouth nightly.       | tablet    |         | 13       | 4         |
| tablet               |                      |           |         |          |           |
+----------------------+----------------------+-----------+---------+----------+-----------+
|   tacrolimus         | TAD.                 |           | 0       | 20 | 07/15/201 |
 
| (PROGRAF) 0.5 mg     |                      |           |         | 12       | 4         |
| capsuleIndications:  |                      |           |         |          |           |
| Unspecified          |                      |           |         |          |           |
| hypertensive kidney  |                      |           |         |          |           |
| disease with chronic |                      |           |         |          |           |
|  kidney disease      |                      |           |         |          |           |
| stage I through      |                      |           |         |          |           |
| stage IV, or         |                      |           |         |          |           |
| unspecified(403.90), |                      |           |         |          |           |
|  Complications of    |                      |           |         |          |           |
| transplanted kidney, |                      |           |         |          |           |
|  Diabetes mellitus   |                      |           |         |          |           |
| type II,             |                      |           |         |          |           |
| uncontrolled (HCC),  |                      |           |         |          |           |
| Hyperlipidemia       |                      |           |         |          |           |
+----------------------+----------------------+-----------+---------+----------+-----------+
|   tacrolimus         | Take 1.5 mg by mouth |           | 0       | 20 |  |
| (PROGRAF) 1 mg       |  2 times daily.      |           |         | 13       | 4         |
| capsuleIndications:  |                      |           |         |          |           |
| Unspecified          |                      |           |         |          |           |
| hypertensive kidney  |                      |           |         |          |           |
| disease with chronic |                      |           |         |          |           |
|  kidney disease      |                      |           |         |          |           |
| stage I through      |                      |           |         |          |           |
| stage IV, or         |                      |           |         |          |           |
| unspecified(403.90), |                      |           |         |          |           |
|  FSGS (focal         |                      |           |         |          |           |
| segmental            |                      |           |         |          |           |
| glomerulosclerosis), |                      |           |         |          |           |
|  Hyperlipidemia,     |                      |           |         |          |           |
| Complications of     |                      |           |         |          |           |
| transplanted kidney  |                      |           |         |          |           |
+----------------------+----------------------+-----------+---------+----------+-----------+
|   valsartan (DIOVAN) | Take 1 tablet by     |   30      | 11      | 20 |  |
|  160 mg tablet       | mouth Daily.         | tablet    |         | 14       | 4         |
+----------------------+----------------------+-----------+---------+----------+-----------+
 documented as of this encounter
 
 Plan of Treatment
 
 
+--------+-----------+------------+----------------------+-------------------+
| Date   | Type      | Specialty  | Care Team            | Description       |
+--------+-----------+------------+----------------------+-------------------+
| / | Office    | Cardiology |   Tonia Wilson,    |                   |
|    | Visit     |            | ARNP  [address]   |                   |
|        |           |            | [place]  |                   |
|        |           |            | [address]  [phone]  |                   |
|        |           |            |  [phone] (Fax)  |                   |
+--------+-----------+------------+----------------------+-------------------+
| / | Hospital  | Radiology  |   Popeye Townsend, |                   |
|    | Encounter |            |  MD  [address] |                   |
|        |           |            |  [place],   |                   |
|        |           |            | [place] [address]             |                   |
|        |           |            | [phone]         |                   |
|        |           |            | [phone] (Fax)   |                   |
+--------+-----------+------------+----------------------+-------------------+
| / | Surgery   | Radiology  |   Popeye Townsend, | CV EP PPM SYSTEM  |
|    |           |            |  MD  [address] | IMPLANT           |
|        |           |            |  St.  [place],   |                   |
 
|        |           |            | [place] [address]             |                   |
|        |           |            | [phone]         |                   |
|        |           |            | [phone] (Fax)   |                   |
+--------+-----------+------------+----------------------+-------------------+
| 02/10/ | Clinical  | Cardiology |                      |                   |
|    | Support   |            |                      |                   |
+--------+-----------+------------+----------------------+-------------------+
| / | Office    | Cardiology |   Tonia Wilson,    |                   |
|    | Visit     |            | ARNP  [address]   |                   |
|        |           |            | [place]  |                   |
|        |           |            | [address]  [phone]  |                   |
|        |           |            |  [phone] (Fax)  |                   |
+--------+-----------+------------+----------------------+-------------------+
| / | Off-Site  | Nephrology |   Marzena Moser  |                   |
|    | Visit     |            | M, DO  [address]      |                   |
|        |           |            | [address]      |                   |
|        |           |            | [place]      |                   |
|        |           |            | [address]  [phone]  |                   |
|        |           |            |  [phone] (Fax)  |                   |
+--------+-----------+------------+----------------------+-------------------+
 documented as of this encounter
 
 Procedures
 
 
+----------------------+--------+-------------+----------------------+----------------------
+
| Procedure Name       | Priori | Date/Time   | Associated Diagnosis | Comments             
|
|                      | ty     |             |                      |                      
|
+----------------------+--------+-------------+----------------------+----------------------
+
| PFT PULMONARY        | ASAP   | 2014  |   Cough  Dyspnea     |   Results for this   
|
| FUNCTION TESTING     |        |  7:13 AM    |                      | procedure are in the 
|
| ORDERS               |        | PDT         |                      |  results section.    
|
+----------------------+--------+-------------+----------------------+----------------------
+
| PFT PULMONARY        | ASAP   | 2014  |   Cough  Dyspnea     |   Results for this   
|
| FUNCTION TESTING     |        |  7:13 AM    |                      | procedure are in the 
|
| ORDERS               |        | PDT         |                      |  results section.    
|
+----------------------+--------+-------------+----------------------+----------------------
+
| PFT PULMONARY        | ASAP   | 2014  |   Cough  Dyspnea     |   Results for this   
|
| FUNCTION TESTING     |        |  7:13 AM    |                      | procedure are in the 
|
| ORDERS               |        | PDT         |                      |  results section.    
|
+----------------------+--------+-------------+----------------------+----------------------
+
| PFT PULMONARY        | ASAP   | 2014  |   Cough  Dyspnea     |   Results for this   
|
| FUNCTION TESTING     |        |  7:13 AM    |                      | procedure are in the 
 
|
| ORDERS               |        | PDT         |                      |  results section.    
|
+----------------------+--------+-------------+----------------------+----------------------
+
| DIAGNOSTIC REPORT -  |        | 04/15/2014  |                      |                      
|
| EXTERNAL SCAN        |        | 12:00 AM    |                      |                      
|
|                      |        | PDT         |                      |                      
|
+----------------------+--------+-------------+----------------------+----------------------
+
 documented in this encounter
 
 Results
 PFT PULMONARY FUNCTION TESTING ORDERS Full PFT (Startex w/BD, lung volumes, diffusion)?: Yes 
(2014  7:13 AM PDT)
 
+------------------------------------------------------------------------+--------------+
| Narrative                                                              | Performed At |
+------------------------------------------------------------------------+--------------+
|   Nena Boykin MD       2014   7:13        PULMONARY     |              |
| FUNCTION TESTING   SPIROMETRY: FVC was normal at 2.60 L or 81% of      |              |
| predicted. FEV1   was normal at 2.15 L or 87% of predicted. FEV1/FVC   |              |
| ratio was   normal at 83%.   LUNG VOLUMES: Total lung capacity was     |              |
| normal at 4.28 L or 80% of   predicted. Residual volume was moderately |              |
|  reduced at 1.29 L or   58% of predicted. RV/TLC ratio was mildly      |              |
| reduced at 30% or 71 %   of predicted. ERV was significantly reduced   |              |
| at 0.26 L or 24% of   predicted.   DIFFUSION CAPACITY: Diffusion       |              |
| capacity was moderately reduced at   14.1 mL/mmHg per minute or 56% of |              |
|  predicted and was not corrected   for a measured hemoglobin.          |              |
| IMPRESSION: Spirometry is consistent with normal physiology. Lung      |              |
| volume testing is consistent with suggests possible mild   restrictive |              |
|  physiology physiology. Diffusion capacity is   moderately reduced and |              |
|  is not corrected for measured hemoglobin.   Spriometry has not        |              |
| changed significantly since 2013.   Since 2013, there |              |
|  has been a drop in FVC and FEV1 that   exceeds 10% (exact percent not |              |
|  calculated). DLCO has increased   since that time. Neither DLCO value |              |
|  was corrected for a measured   hemoglobin.     Electronically signed  |              |
| by: Nena Boykin MD 2014   7:06  [facility]   |              |
| [facility]     CC: Marzena Anandmaxx                         |              |
+------------------------------------------------------------------------+--------------+
 
 
 
+------------------------------------------------------------------------------------------+
| Procedure Note                                                                           |
+------------------------------------------------------------------------------------------+
|   Nena Boykin MD - 2014  7:06 AM PDT  PULMONARY FUNCTION TESTING        |
| SPIROMETRY: FVC was normal at 2.60 L or 81% of predicted. FEV1 was normal at 2.15 L or   |
| 87% of predicted. FEV1/FVC ratio was normal at 83%. LUNG VOLUMES: Total lung capacity    |
| was normal at 4.28 L or 80% of predicted. Residual volume was moderately reduced at 1.29 |
|  L or 58% of predicted. RV/TLC ratio was mildly reduced at 30% or 71 % of predicted. ERV |
|  was significantly reduced at 0.26 L or 24% of predicted. DIFFUSION CAPACITY: Diffusion  |
| capacity was moderately reduced at 14.1 mL/mmHg per minute or 56% of predicted and was   |
| not corrected for a measured hemoglobin.IMPRESSION: Spirometry is consistent with normal |
|  physiology. Lung volume testing is consistent with suggests possible mild restrictive   |
| physiology physiology. Diffusion capacity is moderately reduced and is not corrected for |
|  measured hemoglobin. Spriometry has not changed significantly since 2013. Since |
 
|  2013, there has been a drop in FVC and FEV1 that exceeds 10% (exact percent    |
| not calculated). DLCO has increased since that time. Neither DLCO value was corrected    |
| for a measured hemoglobin.Electronically signed by: Nena Boykin MD 2014  |
| 7[facility]06[facility]oser                       |
+------------------------------------------------------------------------------------------+
 documented in this encounter
 
 Visit Diagnoses
 
 
+------------------------------------------------------+
| Diagnosis                                            |
+------------------------------------------------------+
|   Cough                                              |
+------------------------------------------------------+
|   Dyspnea  Other dyspnea and respiratory abnormality |
+------------------------------------------------------+
 documented in this encounter

## 2020-01-08 NOTE — XMS
Encounter Summary
  Created on: 2020
 
 Viviana Ricci
 External Reference #: [id number]
 : 47
 Sex: Female
 
 Demographics
 
 
+-----------------------+----------------------+
| Address               | [address]      |
|                       | [place]  [address] |
+-----------------------+----------------------+
| Home Phone            | [phone]      |
+-----------------------+----------------------+
| Preferred Language    | Unknown              |
+-----------------------+----------------------+
| Marital Status        | Single               |
+-----------------------+----------------------+
| [employer] Affiliation | [id number]                 |
+-----------------------+----------------------+
| Race                  | Unknown              |
+-----------------------+----------------------+
| Ethnic Group          | Unknown              |
+-----------------------+----------------------+
 
 
 Author
 
 
+--------------+--------------------------------------------+
| Author       | [facility]  |
|              | and [facility]                                |
+--------------+--------------------------------------------+
| Organization | [facility]  |
|              | and [place]                                |
+--------------+--------------------------------------------+
| Address      | Unknown                                    |
+--------------+--------------------------------------------+
| Phone        | Unavailable                                |
+--------------+--------------------------------------------+
 
 
 
 Support
 
 
+----------------+--------------+---------------------+-----------------+
| Name           | Relationship | Address             | Phone           |
+----------------+--------------+---------------------+-----------------+
| Ada/Ed Radhames | ECON         | [address]              | [phone] |
|                |              | [place]  |                 |
|                |              |  [address]              |                 |
+----------------+--------------+---------------------+-----------------+
 
 
 
 
 Care Team Providers
 
 
+------------------------+------+-----------------+
| Care Team Member Name  | Role | Phone           |
+------------------------+------+-----------------+
| Marzena Moser DO | PCP  | [phone] |
+------------------------+------+-----------------+
 
 
 
 Encounter Details
 
 
+--------+----------+---------------------+----------------------+-------------+
| Date   | Type     | Department          | Care Team            | Description |
+--------+----------+---------------------+----------------------+-------------+
| / | Abstract |   [facility]         |   Marzena Moser  |             |
| 2019   |          | NEPHROLOGY  [address]   | DO [place]TIENNE  [address]      |             |
|        |          | [address]   | [address]      |             |
|        |          | [place], [place]     | [place], [place]      |             |
|        |          | [address]          | [address]  [phone]  |             |
|        |          | [phone]        |  [phone] (Fax)  |             |
+--------+----------+---------------------+----------------------+-------------+
 
 
 
 Social History
 
 
+--------------+-------+-----------+--------+------+
| Tobacco Use  | Types | Packs/Day | Years  | Date |
|              |       |           | Used   |      |
+--------------+-------+-----------+--------+------+
| Never Smoker |       |           |        |      |
+--------------+-------+-----------+--------+------+
 
 
 
+---------------------+---+---+---+
| Smokeless Tobacco:  |   |   |   |
| Never Used          |   |   |   |
+---------------------+---+---+---+
 
 
 
+---------------------------------------------------------------+
| Comments: some second hand smoke exposure, but fairly minimal |
+---------------------------------------------------------------+
 
 
 
+-------------+-------------+---------+----------+
| Alcohol Use | Drinks/Week | oz/Week | Comments |
+-------------+-------------+---------+----------+
| No          |             |         |          |
+-------------+-------------+---------+----------+
 
 
 
 
+------------------+---------------+
| Sex Assigned at  | Date Recorded |
| Birth            |               |
+------------------+---------------+
| Not on file      |               |
+------------------+---------------+
 
 
 
+----------------+-------------+-------------+
| Job Start Date | Occupation  | Industry    |
+----------------+-------------+-------------+
| Not on file    | Not on file | Not on file |
+----------------+-------------+-------------+
 
 
 
+----------------+--------------+------------+
| Travel History | Travel Start | Travel End |
+----------------+--------------+------------+
 
 
 
+-------------------------------------+
| No recent travel history available. |
+-------------------------------------+
 documented as of this encounter
 
 Plan of Treatment
 
 
+--------+-----------+------------+----------------------+-------------------+
| Date   | Type      | Specialty  | Care Team            | Description       |
+--------+-----------+------------+----------------------+-------------------+
| / | Office    | Cardiology |   Tonia Wilson,    |                   |
|    | Visit     |            | ARNP  [address]   |                   |
|        |           |            | St  [place] [facility] [place]  |                   |
|        |           |            | [address]  [phone]  |                   |
|        |           |            |  [phone] (Fax)  |                   |
+--------+-----------+------------+----------------------+-------------------+
| / | Hospital  | Radiology  |   Popeye Townsend, |                   |
|    | Encounter |            |  MD  [address] |                   |
|        |           |            |  St.  [place],   |                   |
|        |           |            | [place] [address]             |                   |
|        |           |            | [phone]         |                   |
|        |           |            | [phone] (Fax)   |                   |
+--------+-----------+------------+----------------------+-------------------+
| / | Surgery   | Radiology  |   Popeye Townsend, | CV EP PPM SYSTEM  |
|    |           |            |  MD  [address] | IMPLANT           |
|        |           |            |  St.  [place],   |                   |
|        |           |            | [place] [address]             |                   |
|        |           |            | [phone]         |                   |
|        |           |            | [phone] (Fax)   |                   |
+--------+-----------+------------+----------------------+-------------------+
| 02/10/ | Clinical  | Cardiology |                      |                   |
2020   | Support   |            |                      |                   |
+--------+-----------+------------+----------------------+-------------------+
| / | Office    | Cardiology |   Tonia Wilson,    |                   |
|    | Visit     |            | [facility]  [address]   |                   |
 
|        |           |            |   [place]  |                   |
|        |           |            | [address]  [phone]  |                   |
|        |           |            |  [phone] (Fax)  |                   |
+--------+-----------+------------+----------------------+-------------------+
| / | Off-Site  | Nephrology |   Marzena Moser  |                   |
2020   | Visit     |            | DO ETIENNE  [address]      |                   |
|        |           |            | [address]      |                   |
|        |           |            | [place]      |                   |
|        |           |            | [address]  [phone]  |                   |
|        |           |            |  [phone] (Fax)  |                   |
+--------+-----------+------------+----------------------+-------------------+
 documented as of this encounter
 
 Procedures
 
 
+--------------------+--------+------------+----------------------+----------------------+
| Procedure Name     | Priori | Date/Time  | Associated Diagnosis | Comments             |
|                    | ty     |            |                      |                      |
+--------------------+--------+------------+----------------------+----------------------+
| EXTERNAL LAB:      | Routin | 2019 |                      |   Results for this   |
| TACROLIMUS LEVEL,  | e      |            |                      | procedure are in the |
| LC-MS/MS           |        |            |                      |  results section.    |
+--------------------+--------+------------+----------------------+----------------------+
 documented in this encounter
 
 Results
 External Lab: Tacrolimus Level, LC-MS/MS (2019)
 
+-------------+-------+-----------+------------+--------------+
| Component   | Value | Ref Range | Performed  | Pathologist  |
|             |       |           | At         | Signature    |
+-------------+-------+-----------+------------+--------------+
| Tacrolimus, | 5.1   |           |            |              |
|  LC-MS/MS,  |       |           |            |              |
| External    |       |           |            |              |
+-------------+-------+-----------+------------+--------------+
 
 
 
+----------+
| Specimen |
+----------+
|          |
+----------+
 documented in this encounter
 
 Visit Diagnoses
 Not on filedocumented in this encounter

## 2020-01-08 NOTE — XMS
Encounter Summary
  Created on: 2020
 
 Viviana Ricci
 External Reference #: [id number]
 : 47
 Sex: Female
 
 Demographics
 
 
+-----------------------+----------------------+
| Address               | [address]      |
|                       | [place]  [address] |
+-----------------------+----------------------+
| Home Phone            | [phone]      |
+-----------------------+----------------------+
| Preferred Language    | Unknown              |
+-----------------------+----------------------+
| Marital Status        | Single               |
+-----------------------+----------------------+
| Anabaptist Affiliation | [id number]                 |
+-----------------------+----------------------+
| Race                  | Unknown              |
+-----------------------+----------------------+
| Ethnic Group          | Unknown              |
+-----------------------+----------------------+
 
 
 Author
 
 
+--------------+--------------------------------------------+
| Author       | [facility]  |
|              | and [facility]                                |
+--------------+--------------------------------------------+
| Organization | [facility]  |
|              | and [facility]                                |
+--------------+--------------------------------------------+
| Address      | Unknown                                    |
+--------------+--------------------------------------------+
| Phone        | Unavailable                                |
+--------------+--------------------------------------------+
 
 
 
 Support
 
 
+----------------+--------------+---------------------+-----------------+
| Name           | Relationship | Address             | Phone           |
+----------------+--------------+---------------------+-----------------+
| Ada/Ed Radhames | ECON         | [place]              | [phone] |
|                |              | [place]  |                 |
|                |              |  [address]              |                 |
+----------------+--------------+---------------------+-----------------+
 
 
 
 
 Care Team Providers
 
 
+-----------------------+------+-------------+
| Care Team Member Name | Role | Phone       |
+-----------------------+------+-------------+
 PCP  | Unavailable |
+-----------------------+------+-------------+
 
 
 
 Encounter Details
 
 
+--------+-------------+---------------------+----------------------+----------------------+
| Date   | Type        | Department          | Care Team            | Description          |
+--------+-------------+---------------------+----------------------+----------------------+
| / | Orders Only |   [facility]         |   Marzena Moser  | Chronic venous       |
| 2015   |             | NEPHROLOGY  [address]   | M, DO  [address]      | embolism and         |
|        |             | [address]   | [address]      | thrombosis of deep   |
|        |             | [place], [place]     | [place], [place]      | vessels of proximal  |
|        |             | [address]          | [address]  [phone]  | lower extremity,     |
|        |             | [phone]        |  [phone] (Fax)  | left (HCC) (Primary  |
|        |             |                     |                      | Dx)                  |
+--------+-------------+---------------------+----------------------+----------------------+
 
 
 
 Social History
 
 
+--------------+-------+-----------+--------+------+
| Tobacco Use  | Types | Packs/Day | Years  | Date |
|              |       |           | Used   |      |
+--------------+-------+-----------+--------+------+
| Never Smoker |       |           |        |      |
+--------------+-------+-----------+--------+------+
 
 
 
+---------------------+---+---+---+
| Smokeless Tobacco:  |   |   |   |
| Never Used          |   |   |   |
+---------------------+---+---+---+
 
 
 
+---------------------------------------------------------------+
| Comments: some second hand smoke exposure, but fairly minimal |
+---------------------------------------------------------------+
 
 
 
+-------------+-------------+---------+----------+
| Alcohol Use | Drinks/Week | oz/Week | Comments |
+-------------+-------------+---------+----------+
| No          |             |         |          |
+-------------+-------------+---------+----------+
 
 
 
 
+------------------+---------------+
| Sex Assigned at  | Date Recorded |
| Birth            |               |
+------------------+---------------+
| Not on file      |               |
+------------------+---------------+
 
 
 
+----------------+-------------+-------------+
| Job Start Date | Occupation  | Industry    |
+----------------+-------------+-------------+
| Not on file    | Not on file | Not on file |
+----------------+-------------+-------------+
 
 
 
+----------------+--------------+------------+
| Travel History | Travel Start | Travel End |
+----------------+--------------+------------+
 
 
 
+-------------------------------------+
| No recent travel history available. |
+-------------------------------------+
 documented as of this encounter
 
 Progress Bobbi Petit RN - 2015 12:25 PM PSTPer Dr. Moser's verbal order, apixaban 2.5
 mg PO BID was order for patient. Electronically signed by Bobbi Hanson RN at 2015
 12:30 PM PSTdocumented in this encounter
 
 Plan of Treatment
 
 
+--------+-----------+------------+----------------------+-------------------+
| Date   | Type      | Specialty  | Care Team            | Description       |
+--------+-----------+------------+----------------------+-------------------+
| / | Office    | Cardiology |   Arlen Wilsona,    |                   |
|    | Visit     |            | ARNP  [address]   |                   |
|        |           |            | St [place], [place]  |                   |
|        |           |            | [address]  [phone]  |                   |
|        |           |            |  [phone] (Fax)  |                   |
+--------+-----------+------------+----------------------+-------------------+
| / | Hospital  | Radiology  |   Popeye Townsend, |                   |
2020   | Encounter |            |  MD  [address] |                   |
|        |           |            |  [place],   |                   |
|        |           |            | [place] [address]             |                   |
|        |           |            | [phone]         |                   |
|        |           |            | [phone] (Fax)   |                   |
+--------+-----------+------------+----------------------+-------------------+
| / | Surgery   | Radiology  |   Popeye Townsend, | CV EP PPM SYSTEM  |
|    |           |            |  MD  [address] | IMPLANT           |
|        |           |            |  St.  [place],   |                   |
|        |           |            | [place] [address]             |                   |
|        |           |            | [phone]         |                   |
|        |           |            | [phone] (Fax)   |                   |
 
+--------+-----------+------------+----------------------+-------------------+
| 02/10/ | Clinical  | Cardiology |                      |                   |
|    | Support   |            |                      |                   |
+--------+-----------+------------+----------------------+-------------------+
| / | Office    | Cardiology |   Tonia Wilson,    |                   |
|    | Visit     |            | BELKIS  [address]   |                   |
|        |           |            | [place]  |                   |
|        |           |            | [address]  [phone]  |                   |
|        |           |            |  [phone] (Fax)  |                   |
+--------+-----------+------------+----------------------+-------------------+
| / | Off-Site  | Nephrology |   Marzena Moser  |                   |
|    | Visit     |            | DO ETIENNE  [address] [place] [address]      |                   |
|        |           |            | [address]      |                   |
|        |           |            | [place]      |                   |
|        |           |            | [address]  [phone]  |                   |
|        |           |            |  [phone] (Fax)  |                   |
+--------+-----------+------------+----------------------+-------------------+
 documented as of this encounter
 
 Visit Diagnoses
 
 
+----------------------------------------------------------------------------------------+
| Diagnosis                                                                              |
+----------------------------------------------------------------------------------------+
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
| left (HCC) - Primary                                                                   |
+----------------------------------------------------------------------------------------+
 documented in this encounter

## 2020-01-08 NOTE — XMS
Encounter Summary
  Created on: 2020
 
 Viviana Ricci
 External Reference #: [id number]
 : 47
 Sex: Female
 
 Demographics
 
 
+-----------------------+----------------------+
| Address               | [address]      |
|                       | [place]  [address] |
+-----------------------+----------------------+
| Home Phone            | [phone]      |
+-----------------------+----------------------+
| Preferred Language    | Unknown              |
+-----------------------+----------------------+
| Marital Status        | Single               |
+-----------------------+----------------------+
| Sikh Affiliation | [id number]                 |
+-----------------------+----------------------+
| Race                  | Unknown              |
+-----------------------+----------------------+
| Ethnic Group          | Unknown              |
+-----------------------+----------------------+
 
 
 Author
 
 
+--------------+--------------------------------------------+
| Author       | [facility]  |
|              | and [facility]                                |
+--------------+--------------------------------------------+
| Organization | [facility]  |
|              | and [facility]                                |
+--------------+--------------------------------------------+
| Address      | Unknown                                    |
+--------------+--------------------------------------------+
| Phone        | Unavailable                                |
+--------------+--------------------------------------------+
 
 
 
 Support
 
 
+----------------+--------------+---------------------+-----------------+
| Name           | Relationship | Address             | Phone           |
+----------------+--------------+---------------------+-----------------+
| Ada/Ed Radhames | ECON         | [place]              | [phone] |
|                |              | [place]  |                 |
|                |              |  [address]              |                 |
+----------------+--------------+---------------------+-----------------+
 
 
 
 
 Care Team Providers
 
 
+-----------------------+------+-------------+
| Care Team Member Name | Role | Phone       |
+-----------------------+------+-------------+
 PCP  | Unavailable |
+-----------------------+------+-------------+
 
 
 
 Reason for Visit
 
 
+-------------------+----------+
| Reason            | Comments |
+-------------------+----------+
| Medication Refill |          |
+-------------------+----------+
 
 
 
 Encounter Details
 
 
+--------+--------+---------------------+----------------------+-------------------+
| Date   | Type   | Department          | Care Team            | Description       |
+--------+--------+---------------------+----------------------+-------------------+
| / | Refill |   [facility]         |   Marzena Moser  | Medication Refill |
|    |        | NEPHROLOGY  [address]   | M, DO  [address]      |                   |
|        |        | [address]   | [address]      |                   |
|        |        | [place], [place]     | [place], [place]      |                   |
|        |        | [address]          | [address]  [phone]  |                   |
|        |        | [phone]        |  [phone] (Fax)  |                   |
+--------+--------+---------------------+----------------------+-------------------+
 
 
 
 Social History
 
 
+--------------+-------+-----------+--------+------+
| Tobacco Use  | Types | Packs/Day | Years  | Date |
|              |       |           | Used   |      |
+--------------+-------+-----------+--------+------+
| Never Smoker |       |           |        |      |
+--------------+-------+-----------+--------+------+
 
 
 
+---------------------+---+---+---+
| Smokeless Tobacco:  |   |   |   |
| Never Used          |   |   |   |
+---------------------+---+---+---+
 
 
 
+---------------------------------------------------------------+
| Comments: some second hand smoke exposure, but fairly minimal |
 
+---------------------------------------------------------------+
 
 
 
+-------------+-------------+---------+----------+
| Alcohol Use | Drinks/Week | oz/Week | Comments |
+-------------+-------------+---------+----------+
| No          |             |         |          |
+-------------+-------------+---------+----------+
 
 
 
+------------------+---------------+
| Sex Assigned at  | Date Recorded |
| Birth            |               |
+------------------+---------------+
| Not on file      |               |
+------------------+---------------+
 
 
 
+----------------+-------------+-------------+
| Job Start Date | Occupation  | Industry    |
+----------------+-------------+-------------+
| Not on file    | Not on file | Not on file |
+----------------+-------------+-------------+
 
 
 
+----------------+--------------+------------+
| Travel History | Travel Start | Travel End |
+----------------+--------------+------------+
 
 
 
+-------------------------------------+
| No recent travel history available. |
+-------------------------------------+
 documented as of this encounter
 
 Plan of Treatment
 
 
+--------+-----------+------------+----------------------+-------------------+
| Date   | Type      | Specialty  | Care Team            | Description       |
+--------+-----------+------------+----------------------+-------------------+
| / | Office    | Cardiology |   HellalfredoTonia,    |                   |
|    | Visit     |            | ARNP  [address]   |                   |
|        |           |            | St  [place], [place]  |                   |
|        |           |            | [address]  [phone]  |                   |
|        |           |            |  [phone] (Fax)  |                   |
+--------+-----------+------------+----------------------+-------------------+
| / | Hospital  | Radiology  |   Popeye Townsend, |                   |
|    | Encounter |            |  MD  [address] |                   |
|        |           |            |  St.  [place],   |                   |
|        |           |            | [place] [address]             |                   |
|        |           |            | [phone]         |                   |
|        |           |            | [phone] (Fax)   |                   |
+--------+-----------+------------+----------------------+-------------------+
| / | Surgery   | Radiology  |   Popeye Townsend, | CV EP PPM SYSTEM  |
 
|    |           |            |  MD  [address] | IMPLANT           |
|        |           |            |  St.  [place],   |                   |
|        |           |            | [place] [address]             |                   |
|        |           |            | [phone]         |                   |
|        |           |            | [phone] (Fax)   |                   |
+--------+-----------+------------+----------------------+-------------------+
| 02/10/ | Clinical  | Cardiology |                      |                   |
|    | Support   |            |                      |                   |
+--------+-----------+------------+----------------------+-------------------+
| / | Office    | Cardiology |   Tonia Wilson,    |                   |
|    | Visit     |            | ARNP  [address]   |                   |
|        |           |            | [place]  |                   |
|        |           |            | [address]  [phone]  |                   |
|        |           |            |  [phone] (Fax)  |                   |
+--------+-----------+------------+----------------------+-------------------+
| / | Off-Site  | Nephrology |   Marzena Moser  |                   |
|    | Visit     |            | DO ETIENNE  [address] [place] [address]      |                   |
|        |           |            | [address]      |                   |
|        |           |            | [place]      |                   |
|        |           |            | [address]  [phone]  |                   |
|        |           |            |  [phone] (Fax)  |                   |
+--------+-----------+------------+----------------------+-------------------+
 documented as of this encounter
 
 Visit Diagnoses
 
 
+------------------------------------------------------------------------------------------+
| Diagnosis                                                                                |
+------------------------------------------------------------------------------------------+
|   Type II or unspecified type diabetes mellitus without mention of complication,         |
| uncontrolled - Primary                                                                   |
+------------------------------------------------------------------------------------------+
|   Insulin dependent type 2 diabetes mellitus, uncontrolled (HCC)  Type II or unspecified |
|  type diabetes mellitus without mention of complication, uncontrolled                    |
+------------------------------------------------------------------------------------------+
 documented in this encounter

## 2020-01-08 NOTE — XMS
Encounter Summary
  Created on: 2020
 
 Viviana Ricci
 External Reference #: [id number]
 : 47
 Sex: Female
 
 Demographics
 
 
+-----------------------+----------------------+
| Address               | [address]      |
|                       | [place]  [address] |
+-----------------------+----------------------+
| Home Phone            | [phone]      |
+-----------------------+----------------------+
| Preferred Language    | Unknown              |
+-----------------------+----------------------+
| Marital Status        | Single               |
+-----------------------+----------------------+
| Gnosticist Affiliation | [id number]                 |
+-----------------------+----------------------+
| Race                  | Unknown              |
+-----------------------+----------------------+
| Ethnic Group          | Unknown              |
+-----------------------+----------------------+
 
 
 Author
 
 
+--------------+--------------------------------------------+
| Author       | [facility]  |
|              | and [place]                                |
+--------------+--------------------------------------------+
| Organization | [facility]  |
|              | and [place]                                |
+--------------+--------------------------------------------+
| Address      | Unknown                                    |
+--------------+--------------------------------------------+
| Phone        | Unavailable                                |
+--------------+--------------------------------------------+
 
 
 
 Support
 
 
+----------------+--------------+---------------------+-----------------+
| Name           | Relationship | Address             | Phone           |
+----------------+--------------+---------------------+-----------------+
| Ada/Ed Radhames | ECON         | [place]              | [phone] |
|                |              | [place], [place]  |                 |
|                |              |  [address]              |                 |
+----------------+--------------+---------------------+-----------------+
 
 
 
 
 Care Team Providers
 
 
+-----------------------+------+-------------+
| Care Team Member Name | Role | Phone       |
+-----------------------+------+-------------+
 PCP  | Unavailable |
+-----------------------+------+-------------+
 
 
 
 Encounter Details
 
 
+--------+-----------+----------------------+----------------------+-------------+
| Date   | Type      | Department           | Care Team            | Description |
+--------+-----------+----------------------+----------------------+-------------+
| / | [facility]  |   [facility] |   Popeye Townsend, |             |
|    | Encounter |  MED CTR XRAY  [address] |  MD  [address] |             |
|        |           |  [address]  [place]       |  .  [place],   |             |
|        |           | [place] [address] | [place] [address]             |             |
|        |           |   [phone]       | [phone]         |             |
|        |           |                      | [phone] (Fax)   |             |
+--------+-----------+----------------------+----------------------+-------------+
 
 
 
 Social History
 
 
+----------------+-------+-----------+--------+------+
| Tobacco Use    | Types | Packs/Day | Years  | Date |
|                |       |           | Used   |      |
+----------------+-------+-----------+--------+------+
| Never Assessed |       |           |        |      |
+----------------+-------+-----------+--------+------+
 
 
 
+------------------+---------------+
| Sex Assigned at  | Date Recorded |
| Birth            |               |
+------------------+---------------+
| Not on file      |               |
+------------------+---------------+
 
 
 
+----------------+-------------+-------------+
| Job Start Date | Occupation  | Industry    |
+----------------+-------------+-------------+
| Not on file    | Not on file | Not on file |
+----------------+-------------+-------------+
 
 
 
+----------------+--------------+------------+
| Travel History | Travel Start | Travel End |
+----------------+--------------+------------+
 
 
 
 
+-------------------------------------+
| No recent travel history available. |
+-------------------------------------+
 documented as of this encounter
 
 Plan of Treatment
 
 
+--------+-----------+------------+----------------------+-------------------+
| Date   | Type      | Specialty  | Care Team            | Description       |
+--------+-----------+------------+----------------------+-------------------+
| / | Office    | Cardiology |   Tonia Wilson,    |                   |
|    | Visit     |            | ARNJESSICA  [address]   |                   |
|        |           |            | St  [place], [place]  |                   |
|        |           |            | [address]  [phone]  |                   |
|        |           |            |  [phone] (Fax)  |                   |
+--------+-----------+------------+----------------------+-------------------+
| / | Hospital  | Radiology  |   Popeye Townsend, |                   |
|    | Encounter |            |  MD  [address] |                   |
|        |           |            |  St. [place],   |                   |
|        |           |            | [place] [address]             |                   |
|        |           |            | [phone]         |                   |
|        |           |            | [phone] (Fax)   |                   |
+--------+-----------+------------+----------------------+-------------------+
| / | Surgery   | Radiology  |   Popeye Townsend, | CV EP PPM SYSTEM  |
|    |           |            |  MD  [address] | IMPLANT           |
|        |           |            |  [place],   |                   |
|        |           |            | [place] [address]             |                   |
|        |           |            | [phone]         |                   |
|        |           |            | [phone] (Fax)   |                   |
+--------+-----------+------------+----------------------+-------------------+
| 02/10/ | Clinical  | Cardiology |                      |                   |
|    | Support   |            |                      |                   |
+--------+-----------+------------+----------------------+-------------------+
| / | Office    | Cardiology |   Tonia Wilson,    |                   |
|    | Visit     |            | BELKIS  [address]   |                   |
|        |           |            | [place]  |                   |
|        |           |            | [address]  [phone]  |                   |
|        |           |            |  [phone] (Fax)  |                   |
+--------+-----------+------------+----------------------+-------------------+
| / | Off-Site  | Nephrology |   Marzena Moser  |                   |
|    | Visit     |            | DO ETIENNE  [address] [place] [address]      |                   |
|        |           |            | [address]      |                   |
|        |           |            | [place]      |                   |
|        |           |            | [address] [phone]  |                   |
|        |           |            |  [phone] (Fax)  |                   |
+--------+-----------+------------+----------------------+-------------------+
 documented as of this encounter
 
 Visit Diagnoses
 Not on filedocumented in this encounter

## 2020-01-08 NOTE — XMS
Encounter Summary
  Created on: 2020
 
 Viviana Ricci
 External Reference #: [id number]
 : 47
 Sex: Female
 
 Demographics
 
 
+-----------------------+----------------------+
| Address               | [address]      |
|                       | [place]  [address] |
+-----------------------+----------------------+
| Home Phone            | [phone]      |
+-----------------------+----------------------+
| Preferred Language    | Unknown              |
+-----------------------+----------------------+
| Marital Status        | Single               |
+-----------------------+----------------------+
| Voodoo Affiliation | [id number]                 |
+-----------------------+----------------------+
| Race                  | Unknown              |
+-----------------------+----------------------+
| Ethnic Group          | Unknown              |
+-----------------------+----------------------+
 
 
 Author
 
 
+--------------+--------------------------------------------+
| Author       | [facility]  |
|              | and [place]                                |
+--------------+--------------------------------------------+
| Organization | [facility]  |
|              | and [place]                                |
+--------------+--------------------------------------------+
| Address      | Unknown                                    |
+--------------+--------------------------------------------+
| Phone        | Unavailable                                |
+--------------+--------------------------------------------+
 
 
 
 Support
 
 
+----------------+--------------+---------------------+-----------------+
| Name           | Relationship | Address             | Phone           |
+----------------+--------------+---------------------+-----------------+
| Ada/Ed Radhames | ECON         | [address]              | [phone] |
|                |              | [place]  |                 |
|                |              |  [address]              |                 |
+----------------+--------------+---------------------+-----------------+
 
 
 
 
 Care Team Providers
 
 
+-----------------------+------+-------------+
| Care Team Member Name | Role | Phone       |
+-----------------------+------+-------------+
 PCP  | Unavailable |
+-----------------------+------+-------------+
 
 
 
 Reason for Visit
 
 
+-----------+-----------------------------------------+
| Reason    | Comments                                |
+-----------+-----------------------------------------+
| Lab Order | blood draw question/antibiotic question |
+-----------+-----------------------------------------+
 
 
 
 Encounter Details
 
 
+--------+-----------+---------------------+----------------------+----------------------+
| Date   | Type      | Department          | Care Team            | Description          |
+--------+-----------+---------------------+----------------------+----------------------+
| 10/16/ | Telephone |   [facility]         |   Marzena Moser  | Lab Order (blood     |
|    |           | NEPHROLOGY  [address]   | M, DO  [address]      | draw                 |
|        |           | [address]   | [address]      | question/antibiotic  |
|        |           | [place]     | [place]      | question)            |
|        |           | [address]          | [address] [phone]  |                      |
|        |           | [phone]        |  [phone] (Fax)  |                      |
+--------+-----------+---------------------+----------------------+----------------------+
 
 
 
 Social History
 
 
+----------------+-------+-----------+--------+------+
| Tobacco Use    | Types | Packs/Day | Years  | Date |
|                |       |           | Used   |      |
+----------------+-------+-----------+--------+------+
| Never Assessed |       |           |        |      |
+----------------+-------+-----------+--------+------+
 
 
 
+------------------+---------------+
| Sex Assigned at  | Date Recorded |
| Birth            |               |
+------------------+---------------+
| Not on file      |               |
+------------------+---------------+
 
 
 
 
+----------------+-------------+-------------+
| Job Start Date | Occupation  | Industry    |
+----------------+-------------+-------------+
| Not on file    | Not on file | Not on file |
+----------------+-------------+-------------+
 
 
 
+----------------+--------------+------------+
| Travel History | Travel Start | Travel End |
+----------------+--------------+------------+
 
 
 
+-------------------------------------+
| No recent travel history available. |
+-------------------------------------+
 documented as of this encounter
 
 Plan of Treatment
 
 
+--------+-----------+------------+----------------------+-------------------+
| Date   | Type      | Specialty  | Care Team            | Description       |
+--------+-----------+------------+----------------------+-------------------+
| / | Office    | Cardiology |   Tonia Wilson,    |                   |
|    | Visit     |            | ARNP  [address]   |                   |
|        |           |            | [place]  |                   |
|        |           |            | [address] [phone]  |                   |
|        |           |            |  [phone] (Fax)  |                   |
+--------+-----------+------------+----------------------+-------------------+
| / | Hospital  | Radiology  |   Popeye Townsend, |                   |
|    | Encounter |            |  MD  [address] |                   |
|        |           |            |  St. [place]   |                   |
|        |           |            | [place] [address]             |                   |
|        |           |            | [phone]         |                   |
|        |           |            | [phone] (Fax)   |                   |
+--------+-----------+------------+----------------------+-------------------+
| / | Surgery   | Radiology  |   Popeye Townsend, | CV EP PPM SYSTEM  |
|    |           |            |  MD  [address] | IMPLANT           |
|        |           |            |  St.  [place],   |                   |
|        |           |            | [place] [address]             |                   |
|        |           |            | [phone]         |                   |
|        |           |            | [phone] (Fax)   |                   |
+--------+-----------+------------+----------------------+-------------------+
| 02/10/ | Clinical  | Cardiology |                      |                   |
|    | Support   |            |                      |                   |
+--------+-----------+------------+----------------------+-------------------+
| / | Office    | Cardiology |   Tonia Wilson,    |                   |
|    | Visit     |            | ARNJESSICA  [address]   |                   |
|        |           |            | St  [place]  |                   |
|        |           |            | [address]  [phone]  |                   |
|        |           |            |  [phone] (Fax)  |                   |
+--------+-----------+------------+----------------------+-------------------+
| / | Off-Site  | Nephrology |   Marzena Moser  |                   |
2020   | Visit     |            | DO ETIENNE  [address]      |                   |
|        |           |            | [address]      |                   |
|        |           |            | [place]      |                   |
|        |           |            | [address]  [phone]  |                   |
|        |           |            |  [phone] (Fax)  |                   |
 
+--------+-----------+------------+----------------------+-------------------+
 documented as of this encounter
 
 Visit Diagnoses
 Not on filedocumented in this encounter

## 2020-01-08 NOTE — XMS
Encounter Summary
  Created on: 2020
 
 Viviana Ricci
 External Reference #: [id number]
 : 47
 Sex: Female
 
 Demographics
 
 
+-----------------------+----------------------+
| Address               | [address]      |
|                       | [place]  [address] |
+-----------------------+----------------------+
| Home Phone            | [phone]      |
+-----------------------+----------------------+
| Preferred Language    | Unknown              |
+-----------------------+----------------------+
| Marital Status        | Single               |
+-----------------------+----------------------+
| Cheondoism Affiliation | [id number]                 |
+-----------------------+----------------------+
| Race                  | Unknown              |
+-----------------------+----------------------+
| Ethnic Group          | Unknown              |
+-----------------------+----------------------+
 
 
 Author
 
 
+--------------+--------------------------------------------+
| Author       | [facility]  |
|              | and [place]                                |
+--------------+--------------------------------------------+
| Organization | [facility]  |
|              | and [place]                                |
+--------------+--------------------------------------------+
| Address      | Unknown                                    |
+--------------+--------------------------------------------+
| Phone        | Unavailable                                |
+--------------+--------------------------------------------+
 
 
 
 Support
 
 
+----------------+--------------+---------------------+-----------------+
| Name           | Relationship | Address             | Phone           |
+----------------+--------------+---------------------+-----------------+
| Ada/Ed Radhames | ECON         | [place]              | [phone] |
|                |              | [place]  |                 |
|                |              |  [address]              |                 |
+----------------+--------------+---------------------+-----------------+
 
 
 
 
 Care Team Providers
 
 
+-----------------------+------+-------------+
| Care Team Member Name | Role | Phone       |
+-----------------------+------+-------------+
 PCP  | Unavailable |
+-----------------------+------+-------------+
 
 
 
 Reason for Visit
 
 
+-------------------+----------+
| Reason            | Comments |
+-------------------+----------+
| Medication Refill |          |
+-------------------+----------+
 
 
 
 Encounter Details
 
 
+--------+--------+---------------------+----------------------+-------------------+
| Date   | Type   | Department          | Care Team            | Description       |
+--------+--------+---------------------+----------------------+-------------------+
| / | Refill |   [place]         |   Marzena Moser  | Medication Refill |
|    |        | NEPHROLOGY  [address]   | M, DO  [address]      |                   |
|        |        | [address]   | [address]      |                   |
|        |        | [place], [place]     | [place], [place]      |                   |
|        |        | [address]          | [address]  [phone]  |                   |
|        |        | [phone]        |  [phone] (Fax)  |                   |
+--------+--------+---------------------+----------------------+-------------------+
 
 
 
 Social History
 
 
+--------------+-------+-----------+--------+------+
| Tobacco Use  | Types | Packs/Day | Years  | Date |
|              |       |           | Used   |      |
+--------------+-------+-----------+--------+------+
| Never Smoker |       |           |        |      |
+--------------+-------+-----------+--------+------+
 
 
 
+---------------------+---+---+---+
| Smokeless Tobacco:  |   |   |   |
| Never Used          |   |   |   |
+---------------------+---+---+---+
 
 
 
+---------------------------------------------------------------+
| Comments: some second hand smoke exposure, but fairly minimal |
 
+---------------------------------------------------------------+
 
 
 
+-------------+-------------+---------+----------+
| Alcohol Use | Drinks/Week | oz/Week | Comments |
+-------------+-------------+---------+----------+
| No          |             |         |          |
+-------------+-------------+---------+----------+
 
 
 
+------------------+---------------+
| Sex Assigned at  | Date Recorded |
| Birth            |               |
+------------------+---------------+
| Not on file      |               |
+------------------+---------------+
 
 
 
+----------------+-------------+-------------+
| Job Start Date | Occupation  | Industry    |
+----------------+-------------+-------------+
| Not on file    | Not on file | Not on file |
+----------------+-------------+-------------+
 
 
 
+----------------+--------------+------------+
| Travel History | Travel Start | Travel End |
+----------------+--------------+------------+
 
 
 
+-------------------------------------+
| No recent travel history available. |
+-------------------------------------+
 documented as of this encounter
 
 Plan of Treatment
 
 
+--------+-----------+------------+----------------------+-------------------+
| Date   | Type      | Specialty  | Care Team            | Description       |
+--------+-----------+------------+----------------------+-------------------+
| / | Office    | Cardiology |   HellalfredoTonia,    |                   |
|    | Visit     |            | ARNP  [address]   |                   |
|        |           |            | St  [place], [place]  |                   |
|        |           |            | [address]  [phone]  |                   |
|        |           |            |  [phone] (Fax)  |                   |
+--------+-----------+------------+----------------------+-------------------+
| / | Hospital  | Radiology  |   Popeye Townsend, |                   |
|    | Encounter |            |  MD  [address] |                   |
|        |           |            |  St.  [place],   |                   |
|        |           |            | [place] [address]             |                   |
|        |           |            | [phone]         |                   |
|        |           |            | [phone] (Fax)   |                   |
+--------+-----------+------------+----------------------+-------------------+
| / | Surgery   | Radiology  |   Popeye Townsend, | CV EP PPM SYSTEM  |
 
|    |           |            |  MD  [address] | IMPLANT           |
|        |           |            |  St.  [place],   |                   |
|        |           |            | [place] [address]             |                   |
|        |           |            | [phone]         |                   |
|        |           |            | [phone] (Fax)   |                   |
+--------+-----------+------------+----------------------+-------------------+
| 02/10/ | Clinical  | Cardiology |                      |                   |
|    | Support   |            |                      |                   |
+--------+-----------+------------+----------------------+-------------------+
| / | Office    | Cardiology |   Tonia Wilson,    |                   |
|    | Visit     |            | ARNP  [address]   |                   |
|        |           |            | [place]  |                   |
|        |           |            | [address]  [phone]  |                   |
|        |           |            |  [phone] (Fax)  |                   |
+--------+-----------+------------+----------------------+-------------------+
| / | Off-Site  | Nephrology |   Marzena Moser  |                   |
|    | Visit     |            | DO ETIENNE  [address] [place] [address]      |                   |
|        |           |            | [address]      |                   |
|        |           |            | [place]      |                   |
|        |           |            | [address]  [phone]  |                   |
|        |           |            |  [phone] (Fax)  |                   |
+--------+-----------+------------+----------------------+-------------------+
 documented as of this encounter
 
 Visit Diagnoses
 
 
+------------------------------------------------------------------------------------------+
| Diagnosis                                                                                |
+------------------------------------------------------------------------------------------+
|   Type II diabetes mellitus, uncontrolled (HCC) - Primary  Type II or unspecified type   |
| diabetes mellitus without mention of complication, uncontrolled                          |
+------------------------------------------------------------------------------------------+
|   Insulin dependent type 2 diabetes mellitus, uncontrolled ([facility])  Type II or unspecified |
|  type diabetes mellitus without mention of complication, uncontrolled                    |
+------------------------------------------------------------------------------------------+
 documented in this encounter

## 2020-01-08 NOTE — XMS
Encounter Summary
  Created on: 2020
 
 Viviana Ricci
 External Reference #: [id number]
 : 47
 Sex: Female
 
 Demographics
 
 
+-----------------------+----------------------+
| Address               | [address]      |
|                       | [place]  [address] |
+-----------------------+----------------------+
| Home Phone            | [phone]      |
+-----------------------+----------------------+
| Preferred Language    | Unknown              |
+-----------------------+----------------------+
| Marital Status        | Single               |
+-----------------------+----------------------+
| Hoahaoism Affiliation | 1009                 |
+-----------------------+----------------------+
| Race                  | Unknown              |
+-----------------------+----------------------+
| Ethnic Group          | Unknown              |
+-----------------------+----------------------+
 
 
 Author
 
 
+--------------+--------------------------------------------+
| Author       | [facility]  |
|              | and [place]                                |
+--------------+--------------------------------------------+
| Organization | [facility]  |
|              | and [place]                                |
+--------------+--------------------------------------------+
| Address      | Unknown                                    |
+--------------+--------------------------------------------+
| Phone        | Unavailable                                |
+--------------+--------------------------------------------+
 
 
 
 Support
 
 
+----------------+--------------+---------------------+-----------------+
| Name           | Relationship | Address             | Phone           |
+----------------+--------------+---------------------+-----------------+
| Ada/Ed Radhames | ECON         | [place]              | [phone] |
|                |              | [place]  |                 |
|                |              |  [address]              |                 |
+----------------+--------------+---------------------+-----------------+
 
 
 
 
 Care Team Providers
 
 
+-----------------------+------+-------------+
| Care Team Member Name | Role | Phone       |
+-----------------------+------+-------------+
 PCP  | Unavailable |
+-----------------------+------+-------------+
 
 
 
 Encounter Details
 
 
+--------+----------+---------------------+----------------------+-------------+
| Date   | Type     | Department          | Care Team            | Description |
+--------+----------+---------------------+----------------------+-------------+
| 07/10/ | Abstract |   [facility] [place]         |   Marzena Moser  |             |
|    |          | NEPHROLOGY  [address]   | M, DO  [address]      |             |
|        |          | [address]   | [address]      |             |
|        |          | [place], [place]     | [place]      |             |
|        |          | [address]          | [address]  [phone]  |             |
|        |          | [phone]        |  [phone] (Fax)  |             |
+--------+----------+---------------------+----------------------+-------------+
 
 
 
 Social History
 
 
+--------------+-------+-----------+--------+------+
| Tobacco Use  | Types | Packs/Day | Years  | Date |
|              |       |           | Used   |      |
+--------------+-------+-----------+--------+------+
| Never Smoker |       |           |        |      |
+--------------+-------+-----------+--------+------+
 
 
 
+---------------------+---+---+---+
| Smokeless Tobacco:  |   |   |   |
| Never Used          |   |   |   |
+---------------------+---+---+---+
 
 
 
+---------------------------------------------------------------+
| Comments: some second hand smoke exposure, but fairly minimal |
+---------------------------------------------------------------+
 
 
 
+-------------+-------------+---------+----------+
| Alcohol Use | Drinks/Week | oz/Week | Comments |
+-------------+-------------+---------+----------+
| No          |             |         |          |
+-------------+-------------+---------+----------+
 
 
 
 
+------------------+---------------+
| Sex Assigned at  | Date Recorded |
| Birth            |               |
+------------------+---------------+
| Not on file      |               |
+------------------+---------------+
 
 
 
+----------------+-------------+-------------+
| Job Start Date | Occupation  | Industry    |
+----------------+-------------+-------------+
| Not on file    | Not on file | Not on file |
+----------------+-------------+-------------+
 
 
 
+----------------+--------------+------------+
| Travel History | Travel Start | Travel End |
+----------------+--------------+------------+
 
 
 
+-------------------------------------+
| No recent travel history available. |
+-------------------------------------+
 documented as of this encounter
 
 Plan of Treatment
 
 
+--------+-----------+------------+----------------------+-------------------+
| Date   | Type      | Specialty  | Care Team            | Description       |
+--------+-----------+------------+----------------------+-------------------+
| / | Office    | Cardiology |   Tonia Wilson,    |                   |
|    | Visit     |            | ARNJESSICA  [address]   |                   |
|        |           |            | [place]  |                   |
|        |           |            | [address]  [phone]  |                   |
|        |           |            |  [phone] (Fax)  |                   |
+--------+-----------+------------+----------------------+-------------------+
| / | Hospital  | Radiology  |   Popeye Townsend, |                   |
|    | Encounter |            |  MD  [address] |                   |
|        |           |            |  St.  [place],   |                   |
|        |           |            | [place] [address]             |                   |
|        |           |            | [phone]         |                   |
|        |           |            | [phone] (Fax)   |                   |
+--------+-----------+------------+----------------------+-------------------+
| / | Surgery   | Radiology  |   Popeye Townsend, | CV EP PPM SYSTEM  |
|    |           |            |  MD  [address] | IMPLANT           |
|        |           |            |  St.  [place],   |                   |
|        |           |            | [place] [address]             |                   |
|        |           |            | [phone]         |                   |
|        |           |            | [phone] (Fax)   |                   |
+--------+-----------+------------+----------------------+-------------------+
| 02/10/ | Clinical  | Cardiology |                      |                   |
|    | Support   |            |                      |                   |
+--------+-----------+------------+----------------------+-------------------+
| / | Office    | Cardiology |   Tonia Wilson,    |                   |
|    | Visit     |            | ARNP  [address]   |                   |
 
|        |           |            | St  [place], [place]  |                   |
|        |           |            | [address] [phone]  |                   |
|        |           |            |  [phone] (Fax)  |                   |
+--------+-----------+------------+----------------------+-------------------+
| / | Off-Site  | Nephrology |   Marzena Moser  |                   |
| 2020   | Visit     |            | DO ETIENNE  [address] [place] [address]      |                   |
|        |           |            | [address]      |                   |
|        |           |            | [place]      |                   |
|        |           |            | [id number] [phone]  |                   |
|        |           |            |  [phone] (Fax)  |                   |
+--------+-----------+------------+----------------------+-------------------+
 documented as of this encounter
 
 Visit Diagnoses
 Not on filedocumented in this encounter

## 2020-01-08 NOTE — XMS
Encounter Summary
  Created on: 2020
 
 Viviana Ricci
 External Reference #: [id number]
 : 47
 Sex: Female
 
 Demographics
 
 
+-----------------------+----------------------+
| Address               | [address]      |
|                       | [place]  [address] |
+-----------------------+----------------------+
| Home Phone            | [phone]      |
+-----------------------+----------------------+
| Preferred Language    | Unknown              |
+-----------------------+----------------------+
| Marital Status        | Single               |
+-----------------------+----------------------+
| [employer] Affiliation | [id number]                 |
+-----------------------+----------------------+
| Race                  | Unknown              |
+-----------------------+----------------------+
| Ethnic Group          | Unknown              |
+-----------------------+----------------------+
 
 
 Author
 
 
+--------------+--------------------------------------------+
| Author       | [facility]  |
|              | and [place]                                |
+--------------+--------------------------------------------+
| Organization | [facility]  |
|              | and [place]                                |
+--------------+--------------------------------------------+
| Address      | Unknown                                    |
+--------------+--------------------------------------------+
| Phone        | Unavailable                                |
+--------------+--------------------------------------------+
 
 
 
 Support
 
 
+----------------+--------------+---------------------+-----------------+
| Name           | Relationship | Address             | Phone           |
+----------------+--------------+---------------------+-----------------+
| Ada/Ed Radhames | ECON         | [address]              | [phone] |
|                |              | [place]  |                 |
|                |              |  [address]              |                 |
+----------------+--------------+---------------------+-----------------+
 
 
 
 
 Care Team Providers
 
 
+-----------------------+------+-------------+
| Care Team Member Name | Role | Phone       |
+-----------------------+------+-------------+
 PCP  | Unavailable |
+-----------------------+------+-------------+
 
 
 
 Reason for Visit
 
 
+--------------------+----------+
| Reason             | Comments |
+--------------------+----------+
| Medication Problem |          |
+--------------------+----------+
 
 
 
 Encounter Details
 
 
+--------+--------+---------------------+----------------------+--------------------+
| Date   | Type   | Department          | Care Team            | Description        |
+--------+--------+---------------------+----------------------+--------------------+
| / | Refill |   [facility]         |   Marzena Moser  | Medication Problem |
|    |        | NEPHROLOGY  [address]   | M, DO  [address]      |                    |
|        |        | [address]   | [address]      |                    |
|        |        | [place], [place]     | [place], [place]      |                    |
|        |        | [address]          | [address]  [phone]  |                    |
|        |        | [phone]        |  [phone] (Fax)  |                    |
+--------+--------+---------------------+----------------------+--------------------+
 
 
 
 Social History
 
 
+--------------+-------+-----------+--------+------+
| Tobacco Use  | Types | Packs/Day | Years  | Date |
|              |       |           | Used   |      |
+--------------+-------+-----------+--------+------+
| Never Smoker |       |           |        |      |
+--------------+-------+-----------+--------+------+
 
 
 
+---------------------+---+---+---+
| Smokeless Tobacco:  |   |   |   |
| Never Used          |   |   |   |
+---------------------+---+---+---+
 
 
 
+---------------------------------------------------------------+
| Comments: some second hand smoke exposure, but fairly minimal |
 
+---------------------------------------------------------------+
 
 
 
+-------------+-------------+---------+----------+
| Alcohol Use | Drinks/Week | oz/Week | Comments |
+-------------+-------------+---------+----------+
| No          |             |         |          |
+-------------+-------------+---------+----------+
 
 
 
+------------------+---------------+
| Sex Assigned at  | Date Recorded |
| Birth            |               |
+------------------+---------------+
| Not on file      |               |
+------------------+---------------+
 
 
 
+----------------+-------------+-------------+
| Job Start Date | Occupation  | Industry    |
+----------------+-------------+-------------+
| Not on file    | Not on file | Not on file |
+----------------+-------------+-------------+
 
 
 
+----------------+--------------+------------+
| Travel History | Travel Start | Travel End |
+----------------+--------------+------------+
 
 
 
+-------------------------------------+
| No recent travel history available. |
+-------------------------------------+
 documented as of this encounter
 
 Plan of Treatment
 
 
+--------+-----------+------------+----------------------+-------------------+
| Date   | Type      | Specialty  | Care Team            | Description       |
+--------+-----------+------------+----------------------+-------------------+
| / | Office    | Cardiology |   Tonia Wilson,    |                   |
|    | Visit     |            | ARNJESSICA  [address]   |                   |
|        |           |            | St  [place], [place]  |                   |
|        |           |            | [address]  [phone]  |                   |
|        |           |            |  [phone] (Fax)  |                   |
+--------+-----------+------------+----------------------+-------------------+
| / | Hospital  | Radiology  |   Popeye Townsend, |                   |
|    | Encounter |            |  MD  [address] |                   |
|        |           |            |  St.  [place],   |                   |
|        |           |            | [place] [address]             |                   |
|        |           |            | [phone]         |                   |
|        |           |            | [phone] (Fax)   |                   |
+--------+-----------+------------+----------------------+-------------------+
| / | Surgery   | Radiology  |   Popeye Townsend, | CV EP PPM SYSTEM  |
 
|    |           |            |  MD  [address] | IMPLANT           |
|        |           |            |  St.  [place],   |                   |
|        |           |            | [place] [address]             |                   |
|        |           |            | [phone]         |                   |
|        |           |            | [phone] (Fax)   |                   |
+--------+-----------+------------+----------------------+-------------------+
| 02/10/ | Clinical  | Cardiology |                      |                   |
|    | Support   |            |                      |                   |
+--------+-----------+------------+----------------------+-------------------+
| / | Office    | Cardiology |   Tonia Wilson,    |                   |
|    | Visit     |            | ARNP  [address]   |                   |
|        |           |            | [place]  |                   |
|        |           |            | [address]  [phone]  |                   |
|        |           |            |  [phone] (Fax)  |                   |
+--------+-----------+------------+----------------------+-------------------+
| / | Off-Site  | Nephrology |   Marzena Moser  |                   |
|    | Visit     |            | M, DO  [address]      |                   |
|        |           |            | [address]      |                   |
|        |           |            | [place]      |                   |
|        |           |            | [address]  [phone]  |                   |
|        |           |            |  [phone] (Fax)  |                   |
+--------+-----------+------------+----------------------+-------------------+
 documented as of this encounter
 
 Visit Diagnoses
 Not on filedocumented in this encounter

## 2020-01-08 NOTE — XMS
Encounter Summary
  Created on: 2020
 
 Viviana Ricci
 External Reference #: [id number]
 : 47
 Sex: Female
 
 Demographics
 
 
+-----------------------+----------------------+
| Address               | [address]      |
|                       | [place]  [address] |
+-----------------------+----------------------+
| Home Phone            | [phone]      |
+-----------------------+----------------------+
| Preferred Language    | Unknown              |
+-----------------------+----------------------+
| Marital Status        | Single               |
+-----------------------+----------------------+
| [employer] Affiliation | 1009                 |
+-----------------------+----------------------+
| Race                  | Unknown              |
+-----------------------+----------------------+
| Ethnic Group          | Unknown              |
+-----------------------+----------------------+
 
 
 Author
 
 
+--------------+--------------------------------------------+
| Author       | [facility]  |
|              | and [place]                                |
+--------------+--------------------------------------------+
| Organization | [facility]  |
|              | and [place]                                |
+--------------+--------------------------------------------+
| Address      | Unknown                                    |
+--------------+--------------------------------------------+
| Phone        | Unavailable                                |
+--------------+--------------------------------------------+
 
 
 
 Support
 
 
+----------------+--------------+---------------------+-----------------+
| Name           | Relationship | Address             | Phone           |
+----------------+--------------+---------------------+-----------------+
| Ada/Ed Radhames | ECON         | [address]              | [phone] |
|                |              | [place]  |                 |
|                |              |  [address]              |                 |
+----------------+--------------+---------------------+-----------------+
 
 
 
 
 Care Team Providers
 
 
+------------------------+------+-----------------+
| Care Team Member Name  | Role | Phone           |
+------------------------+------+-----------------+
| Marzena Moser DO | PCP  | [phone] |
+------------------------+------+-----------------+
 
 
 
 Reason for Visit
 
 
+-------------------+----------+
| Reason            | Comments |
+-------------------+----------+
| Medication Refill |          |
+-------------------+----------+
 
 
 
 Encounter Details
 
 
+--------+--------+---------------------+----------------------+-------------------+
| Date   | Type   | Department          | Care Team            | Description       |
+--------+--------+---------------------+----------------------+-------------------+
| / | Refill |   [place]         |   Marzena Moser  | Medication Refill |
|    |        | NEPHROLOGY  [address]   | M, DO  [address]      |                   |
|        |        | [address]   | [address]      |                   |
|        |        | [place], [place]     | [place], [place]      |                   |
|        |        | [address]          | [address]  [phone]  |                   |
|        |        | [phone]        |  [phone] (Fax)  |                   |
+--------+--------+---------------------+----------------------+-------------------+
 
 
 
 Social History
 
 
+--------------+-------+-----------+--------+------+
| Tobacco Use  | Types | Packs/Day | Years  | Date |
|              |       |           | Used   |      |
+--------------+-------+-----------+--------+------+
| Never Smoker |       |           |        |      |
+--------------+-------+-----------+--------+------+
 
 
 
+---------------------+---+---+---+
| Smokeless Tobacco:  |   |   |   |
| Never Used          |   |   |   |
+---------------------+---+---+---+
 
 
 
+---------------------------------------------------------------+
| Comments: some second hand smoke exposure, but fairly minimal |
 
+---------------------------------------------------------------+
 
 
 
+-------------+-------------+---------+----------+
| Alcohol Use | Drinks/Week | oz/Week | Comments |
+-------------+-------------+---------+----------+
| No          |             |         |          |
+-------------+-------------+---------+----------+
 
 
 
+------------------+---------------+
| Sex Assigned at  | Date Recorded |
| Birth            |               |
+------------------+---------------+
| Not on file      |               |
+------------------+---------------+
 
 
 
+----------------+-------------+-------------+
| Job Start Date | Occupation  | Industry    |
+----------------+-------------+-------------+
| Not on file    | Not on file | Not on file |
+----------------+-------------+-------------+
 
 
 
+----------------+--------------+------------+
| Travel History | Travel Start | Travel End |
+----------------+--------------+------------+
 
 
 
+-------------------------------------+
| No recent travel history available. |
+-------------------------------------+
 documented as of this encounter
 
 Plan of Treatment
 
 
+--------+-----------+------------+----------------------+-------------------+
| Date   | Type      | Specialty  | Care Team            | Description       |
+--------+-----------+------------+----------------------+-------------------+
| / | Office    | Cardiology |   Tonia Wilson,    |                   |
|    | Visit     |            | ARNP  [address]   |                   |
|        |           |            | St [place], [place]  |                   |
|        |           |            | [address]  [phone]  |                   |
|        |           |            |  [phone] (Fax)  |                   |
+--------+-----------+------------+----------------------+-------------------+
| / | Hospital  | Radiology  |   Popeye Townsend, |                   |
|    | Encounter |            |  MD  [address] |                   |
|        |           |            |  [place],   |                   |
|        |           |            | [place] [address]             |                   |
|        |           |            | [phone]         |                   |
|        |           |            | [phone] (Fax)   |                   |
+--------+-----------+------------+----------------------+-------------------+
| / | Surgery   | Radiology  |   Popeye Townsend, | CV EP PPM SYSTEM  |
 
|    |           |            |  MD  [address] | IMPLANT           |
|        |           |            |  [place],   |                   |
|        |           |            | [place] [address]             |                   |
|        |           |            | [phone]         |                   |
|        |           |            | [phone] (Fax)   |                   |
+--------+-----------+------------+----------------------+-------------------+
| 02/10/ | Clinical  | Cardiology |                      |                   |
|    | Support   |            |                      |                   |
+--------+-----------+------------+----------------------+-------------------+
| / | Office    | Cardiology |   Tonia Wilson,    |                   |
|    | Visit     |            | ARNP  [address]   |                   |
|        |           |            | [place]  |                   |
|        |           |            | [id number] [phone]  |                   |
|        |           |            |  [phone] (Fax)  |                   |
+--------+-----------+------------+----------------------+-------------------+
| / | Off-Site  | Nephrology |   Marzena Moser  |                   |
|    | Visit     |            | DO ETIENNE  [address] [place] [address]      |                   |
|        |           |            | [address]      |                   |
|        |           |            | [place]      |                   |
|        |           |            | [address] [phone]  |                   |
|        |           |            |  [phone] (Fax)  |                   |
+--------+-----------+------------+----------------------+-------------------+
 documented as of this encounter
 
 Visit Diagnoses
 
 
+-------------------------------------------+
| Diagnosis                                 |
+-------------------------------------------+
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
+-------------------------------------------+
 documented in this encounter

## 2020-01-08 NOTE — XMS
Encounter Summary
  Created on: 2020
 
 Viviana Ricci
 External Reference #: [id number]
 : 47
 Sex: Female
 
 Demographics
 
 
+-----------------------+----------------------+
| Address               | [address]      |
|                       | [place]  [address] |
+-----------------------+----------------------+
| Home Phone            | [phone]      |
+-----------------------+----------------------+
| Preferred Language    | Unknown              |
+-----------------------+----------------------+
| Marital Status        | Single               |
+-----------------------+----------------------+
| Christianity Affiliation | 1009                 |
+-----------------------+----------------------+
| Race                  | Unknown              |
+-----------------------+----------------------+
| Ethnic Group          | Unknown              |
+-----------------------+----------------------+
 
 
 Author
 
 
+--------------+--------------------------------------------+
| Author       | [facility]  |
|              | and [place]                                |
+--------------+--------------------------------------------+
| Organization | [facility]  |
|              | and [place]                                |
+--------------+--------------------------------------------+
| Address      | Unknown                                    |
+--------------+--------------------------------------------+
| Phone        | Unavailable                                |
+--------------+--------------------------------------------+
 
 
 
 Support
 
 
+----------------+--------------+---------------------+-----------------+
| Name           | Relationship | Address             | Phone           |
+----------------+--------------+---------------------+-----------------+
| Ada/Ed Radhames | ECON         | [place]              | [phone] |
|                |              | [place]  |                 |
|                |              |  [address]              |                 |
+----------------+--------------+---------------------+-----------------+
 
 
 
 
 Care Team Providers
 
 
+-----------------------+------+-------------+
| Care Team Member Name | Role | Phone       |
+-----------------------+------+-------------+
 PCP  | Unavailable |
+-----------------------+------+-------------+
 
 
 
 Encounter Details
 
 
+--------+----------+---------------------+----------------------+-------------+
| Date   | Type     | Department          | Care Team            | Description |
+--------+----------+---------------------+----------------------+-------------+
| / | Abstract |   [facility]         |   Marzena Moser  |             |
|    |          | NEPHROLOGY  [address]   | M, DO  [address]      |             |
|        |          | [address]   | [address]      |             |
|        |          | [place], [place]     | [place]      |             |
|        |          | [address]          | [address]  [phone]  |             |
|        |          | [phone]        |  [phone] (Fax)  |             |
+--------+----------+---------------------+----------------------+-------------+
 
 
 
 Social History
 
 
+--------------+-------+-----------+--------+------+
| Tobacco Use  | Types | Packs/Day | Years  | Date |
|              |       |           | Used   |      |
+--------------+-------+-----------+--------+------+
| Never Smoker |       |           |        |      |
+--------------+-------+-----------+--------+------+
 
 
 
+---------------------+---+---+---+
| Smokeless Tobacco:  |   |   |   |
| Never Used          |   |   |   |
+---------------------+---+---+---+
 
 
 
+---------------------------------------------------------------+
| Comments: some second hand smoke exposure, but fairly minimal |
+---------------------------------------------------------------+
 
 
 
+-------------+-------------+---------+----------+
| Alcohol Use | Drinks/Week | oz/Week | Comments |
+-------------+-------------+---------+----------+
| No          |             |         |          |
+-------------+-------------+---------+----------+
 
 
 
 
+------------------+---------------+
| Sex Assigned at  | Date Recorded |
| Birth            |               |
+------------------+---------------+
| Not on file      |               |
+------------------+---------------+
 
 
 
+----------------+-------------+-------------+
| Job Start Date | Occupation  | Industry    |
+----------------+-------------+-------------+
| Not on file    | Not on file | Not on file |
+----------------+-------------+-------------+
 
 
 
+----------------+--------------+------------+
| Travel History | Travel Start | Travel End |
+----------------+--------------+------------+
 
 
 
+-------------------------------------+
| No recent travel history available. |
+-------------------------------------+
 documented as of this encounter
 
 Progress Maite Perez - 2016  8:11 AM PSTOutside record:  Received imaging from [facility], dos 16.  Sent to scan.
 Electronically signed by Maite aRygoza at 2016  8:13 AM PSTdocumented in this encou
nter
 
 Plan of Treatment
 
 
+--------+-----------+------------+----------------------+-------------------+
| Date   | Type      | Specialty  | Care Team            | Description       |
+--------+-----------+------------+----------------------+-------------------+
| / | Office    | Cardiology |   Tonia Wilson,    |                   |
|    | Visit     |            | ARNP  [address]   |                   |
|        |           |            | St  [place]  |                   |
|        |           |            | [address]  [phone]  |                   |
|        |           |            |  [phone] (Fax)  |                   |
+--------+-----------+------------+----------------------+-------------------+
| / | Hospital  | Radiology  |   Popeye Townsend, |                   |
|    | Encounter |            |  MD  [address] |                   |
|        |           |            |  St.  [place],   |                   |
|        |           |            | [place] [address]             |                   |
|        |           |            | [phone]         |                   |
|        |           |            | [phone] (Fax)   |                   |
+--------+-----------+------------+----------------------+-------------------+
| / | Surgery   | Radiology  |   Popeye Townsend, | CV EP PPM SYSTEM  |
|    |           |            |  MD  [address] | IMPLANT           |
|        |           |            |  St.  [place],   |                   |
|        |           |            | [place] [address]             |                   |
|        |           |            | [phone]         |                   |
|        |           |            | [phone] (Fax)   |                   |
 
+--------+-----------+------------+----------------------+-------------------+
| 02/10/ | Clinical  | Cardiology |                      |                   |
|    | Support   |            |                      |                   |
+--------+-----------+------------+----------------------+-------------------+
| / | Office    | Cardiology |   Tonia Wilson,    |                   |
|    | Visit     |            | BELKIS  [address]   |                   |
|        |           |            | [place]  |                   |
|        |           |            | [address]  [phone]  |                   |
|        |           |            |  [phone] (Fax)  |                   |
+--------+-----------+------------+----------------------+-------------------+
| / | Off-Site  | Nephrology |   Marzena Moser  |                   |
|    | Visit     |            | DO ETIENNE  [address] [place] [address]      |                   |
|        |           |            | [address]      |                   |
|        |           |            | [place]      |                   |
|        |           |            | [address]  [phone]  |                   |
|        |           |            |  [phone] (Fax)  |                   |
+--------+-----------+------------+----------------------+-------------------+
 documented as of this encounter
 
 Visit Diagnoses
 Not on filedocumented in this encounter

## 2020-01-08 NOTE — XMS
Encounter Summary
  Created on: 2020
 
 Viviana Ricci
 External Reference #: [id number]
 : 47
 Sex: Female
 
 Demographics
 
 
+-----------------------+----------------------+
| Address               | [address]      |
|                       | [place]  [address] |
+-----------------------+----------------------+
| Home Phone            | [phone]      |
+-----------------------+----------------------+
| Preferred Language    | Unknown              |
+-----------------------+----------------------+
| Marital Status        | Single               |
+-----------------------+----------------------+
| Hinduism Affiliation | 1009                 |
+-----------------------+----------------------+
| Race                  | Unknown              |
+-----------------------+----------------------+
| Ethnic Group          | Unknown              |
+-----------------------+----------------------+
 
 
 Author
 
 
+--------------+--------------------------------------------+
| Author       | [facility]  |
|              | and [place]                                |
+--------------+--------------------------------------------+
| Organization | [facility]  |
|              | and [place]                                |
+--------------+--------------------------------------------+
| Address      | Unknown                                    |
+--------------+--------------------------------------------+
| Phone        | Unavailable                                |
+--------------+--------------------------------------------+
 
 
 
 Support
 
 
+----------------+--------------+---------------------+-----------------+
| Name           | Relationship | Address             | Phone           |
+----------------+--------------+---------------------+-----------------+
| Ada/Ed Radhames | ECON         | [place]              | [phone] |
|                |              | [place]  |                 |
|                |              |  [address]              |                 |
+----------------+--------------+---------------------+-----------------+
 
 
 
 
 Care Team Providers
 
 
+-----------------------+------+-------------+
| Care Team Member Name | Role | Phone       |
+-----------------------+------+-------------+
 PCP  | Unavailable |
+-----------------------+------+-------------+
 
 
 
 Encounter Details
 
 
+--------+-----------+----------------------+----------------------+-------------+
| Date   | Type      | Department           | Care Team            | Description |
+--------+-----------+----------------------+----------------------+-------------+
| 03/15/ | [facility]  |   [facility] |   Clint Maxwell   |             |
|  - | Encounter |  MED CTR MED ONC     | MD MONSERRAT  [address] |             |
|        |           | [address]  |   [place]    |             |
| / |           | [place] [address] | [address]  [phone]  |             |
|    |           |   [phone]       |  [phone] (Fax)  |             |
+--------+-----------+----------------------+----------------------+-------------+
 
 
 
 Social History
 
 
+----------------+-------+-----------+--------+------+
| Tobacco Use    | Types | Packs/Day | Years  | Date |
|                |       |           | Used   |      |
+----------------+-------+-----------+--------+------+
| Never Assessed |       |           |        |      |
+----------------+-------+-----------+--------+------+
 
 
 
+------------------+---------------+
| Sex Assigned at  | Date Recorded |
| Birth            |               |
+------------------+---------------+
| Not on file      |               |
+------------------+---------------+
 
 
 
+----------------+-------------+-------------+
| Job Start Date | Occupation  | Industry    |
+----------------+-------------+-------------+
| Not on file    | Not on file | Not on file |
+----------------+-------------+-------------+
 
 
 
+----------------+--------------+------------+
| Travel History | Travel Start | Travel End |
+----------------+--------------+------------+
 
 
 
 
+-------------------------------------+
| No recent travel history available. |
+-------------------------------------+
 documented as of this encounter
 
 Plan of Treatment
 
 
+--------+-----------+------------+----------------------+-------------------+
| Date   | Type      | Specialty  | Care Team            | Description       |
+--------+-----------+------------+----------------------+-------------------+
| / | Office    | Cardiology |   Tonai Wilson,    |                   |
|    | Visit     |            | ARNP  [address]   |                   |
|        |           |            | St  [place], [place]  |                   |
|        |           |            | [address]  [phone]  |                   |
|        |           |            |  [phone] (Fax)  |                   |
+--------+-----------+------------+----------------------+-------------------+
| / | Hospital  | Radiology  |   Popeye Townsend, |                   |
|    | Encounter |            |  MD  [address] |                   |
|        |           |            |  [place],   |                   |
|        |           |            | [place] [address]             |                   |
|        |           |            | [phone]         |                   |
|        |           |            | [phone] (Fax)   |                   |
+--------+-----------+------------+----------------------+-------------------+
| / | Surgery   | Radiology  |   Popeye Townsend, | CV EP PPM SYSTEM  |
|    |           |            |  MD  [address] | IMPLANT           |
|        |           |            |  [place],   |                   |
|        |           |            | [place] [address]             |                   |
|        |           |            | [phone]         |                   |
|        |           |            | [phone] (Fax)   |                   |
+--------+-----------+------------+----------------------+-------------------+
| 02/10/ | Clinical  | Cardiology |                      |                   |
|    | Support   |            |                      |                   |
+--------+-----------+------------+----------------------+-------------------+
| / | Office    | Cardiology |   Tonia Wilson,    |                   |
|    | Visit     |            | ARNP  [address]   |                   |
|        |           |            | [place]  |                   |
|        |           |            | [address]  [phone]  |                   |
|        |           |            |  [phone] (Fax)  |                   |
+--------+-----------+------------+----------------------+-------------------+
| / | Off-Site  | Nephrology |   Marzena Moser  |                   |
|    | Visit     |            | DO ETIENNE  [address] [place] [address]      |                   |
|        |           |            | [address]      |                   |
|        |           |            | [place]      |                   |
|        |           |            | [address] [phone]  |                   |
|        |           |            |  [phone] (Fax)  |                   |
+--------+-----------+------------+----------------------+-------------------+
 documented as of this encounter
 
 Visit Diagnoses
 Not on filedocumented in this encounter

## 2020-01-08 NOTE — XMS
Encounter Summary
  Created on: 2020
 
 Viviana Ricci
 External Reference #: [id number]
 : 47
 Sex: Female
 
 Demographics
 
 
+-----------------------+----------------------+
| Address               | [address]      |
|                       | [place]  [address] |
+-----------------------+----------------------+
| Home Phone            | [phone]      |
+-----------------------+----------------------+
| Preferred Language    | Unknown              |
+-----------------------+----------------------+
| Marital Status        | Single               |
+-----------------------+----------------------+
| [employer] | [id number]                 |
+-----------------------+----------------------+
| Race                  | Unknown              |
+-----------------------+----------------------+
| Ethnic Group          | Unknown              |
+-----------------------+----------------------+
 
 
 Author
 
 
+--------------+--------------------------------------------+
| Author       | [facility]  |
|              | and [facility]                                |
+--------------+--------------------------------------------+
| Organization | [facility]  |
|              | and [facility]                                |
+--------------+--------------------------------------------+
| Address      | Unknown                                    |
+--------------+--------------------------------------------+
| Phone        | Unavailable                                |
+--------------+--------------------------------------------+
 
 
 
 Support
 
 
+----------------+--------------+---------------------+-----------------+
| Name           | Relationship | Address             | Phone           |
+----------------+--------------+---------------------+-----------------+
| Ada/Ed Radhames | ECON         | [address]              | [phone] |
|                |              | [place]  |                 |
|                |              |  [id number]              |                 |
+----------------+--------------+---------------------+-----------------+
 
 
 
 
 Care Team Providers
 
 
+-----------------------+------+-------------+
| Care Team Member Name | Role | Phone       |
+-----------------------+------+-------------+
 PCP  | Unavailable |
+-----------------------+------+-------------+
 
 
 
 Reason for Referral
 Diagnostic/Screening (Routine)
 
+--------+--------+-----------+--------------+--------------+---------------+
| Status | Reason | Specialty | Diagnoses /  | Referred By  | Referred To   |
|        |        |           | Procedures   | Contact      | Contact       |
+--------+--------+-----------+--------------+--------------+---------------+
| Closed |        | Radiology |   Diagnoses  |   [place], |   [facility] |
|        |        |           |  Other chest |  MD [facility]  |  Medicine     |
|        |        |           |  pain  CAD   |  [address]    | [address]  |
|        |        |           | (coronary    | [address]   |  [place], |
|        |        |           | artery       | [place], |  [place]           |
|        |        |           | disease)     |  [place] [address]    | [address]    |
|        |        |           | Pre-op       | Phone:       | Phone:        |
|        |        |           | evaluation   | [phone] | [phone]  |
|        |        |           | Procedures   |   Fax:       |  Fax:         |
|        |        |           | [facility] Nuclear   | [phone] | [phone]  |
|        |        |           | Stress Test  |              |               |
|        |        |           | (Vasodilator |              |               |
|        |        |           | )            |              |               |
+--------+--------+-----------+--------------+--------------+---------------+
 
 
 
 
 Reason for Visit
 Diagnostic/Screening (Routine)
 
+--------+--------+-----------+--------------+--------------+---------------+
| Status | Reason | Specialty | Diagnoses /  | Referred By  | Referred To   |
|        |        |           | Procedures   | Contact      | Contact       |
+--------+--------+-----------+--------------+--------------+---------------+
| Closed |        | Radiology |   Diagnoses  |   [place] |   [facility] |
|        |        |           |  Other chest |  [facility]  |  Medicine     |
|        |        |           |  pain  CAD   |  [address]    | [address]  |
|        |        |           | (coronary    | [address]   |  [place], |
|        |        |           | artery       | [place], |  [place]           |
|        |        |           | disease)     |  [place] [address]    | [address]    |
|        |        |           | Pre-op       | Phone:       | Phone:        |
|        |        |           | evaluation   | [phone] | [phone]  |
|        |        |           | Procedures   |   Fax:       |  Fax:         |
|        |        |           | [facility] Nuclear   | [phone] | [phone]  |
|        |        |           | Stress Test  |              |               |
|        |        |           | (Vasodilator |              |               |
|        |        |           | )            |              |               |
+--------+--------+-----------+--------------+--------------+---------------+
 
 
 
 
 
 Encounter Details
 
 
+--------+-----------+----------------------+----------------------+----------------------+
| Date   | Type      | Department           | Care Team            | Description          |
+--------+-----------+----------------------+----------------------+----------------------+
| / | Hospital  |   [facility] |   P[facility] Townsend, | Other chest pain;    |
|    | Encounter |  [facility]     |  MD  [address] | CAD (coronary artery |
|        |           | MEDICINE  [address]      |  [address]  [place],   |  disease); Pre-op    |
|        |           | [address] [place]  [place] | [place] [address]             | evaluation           |
|        |           |  [place] [address]       | [phone]         |                      |
|        |           | [phone]         | [phone] (Fax)   |                      |
+--------+-----------+----------------------+----------------------+----------------------+
 
 
 
 Social History
 
 
+--------------+-------+-----------+--------+------+
| Tobacco Use  | Types | Packs/Day | Years  | Date |
|              |       |           | Used   |      |
+--------------+-------+-----------+--------+------+
| Never Smoker |       |           |        |      |
+--------------+-------+-----------+--------+------+
 
 
 
+---------------------+---+---+---+
| Smokeless Tobacco:  |   |   |   |
| Never Used          |   |   |   |
+---------------------+---+---+---+
 
 
 
+---------------------------------------------------------------+
| Comments: some second hand smoke exposure, but fairly minimal |
+---------------------------------------------------------------+
 
 
 
+-------------+-------------+---------+----------+
| Alcohol Use | Drinks/Week | oz/Week | Comments |
+-------------+-------------+---------+----------+
| No          |             |         |          |
+-------------+-------------+---------+----------+
 
 
 
+------------------+---------------+
| Sex Assigned at  | Date Recorded |
| Birth            |               |
+------------------+---------------+
| Not on file      |               |
+------------------+---------------+
 
 
 
 
+----------------+-------------+-------------+
| Job Start Date | Occupation  | Industry    |
+----------------+-------------+-------------+
| Not on file    | Not on file | Not on file |
+----------------+-------------+-------------+
 
 
 
+----------------+--------------+------------+
| Travel History | Travel Start | Travel End |
+----------------+--------------+------------+
 
 
 
+-------------------------------------+
| No recent travel history available. |
+-------------------------------------+
 documented as of this encounter
 
 Last Filed Vital Signs
 
 
+-------------------+-------------------+----------------------+----------+
| Vital Sign        | Reading           | Time Taken           | Comments |
+-------------------+-------------------+----------------------+----------+
| Blood Pressure    | -                 | -                    |          |
+-------------------+-------------------+----------------------+----------+
| Pulse             | -                 | -                    |          |
+-------------------+-------------------+----------------------+----------+
| Temperature       | -                 | -                    |          |
+-------------------+-------------------+----------------------+----------+
| Respiratory Rate  | -                 | -                    |          |
+-------------------+-------------------+----------------------+----------+
| Oxygen Saturation | -                 | -                    |          |
+-------------------+-------------------+----------------------+----------+
| Inhaled Oxygen    | -                 | -                    |          |
| Concentration     |                   |                      |          |
+-------------------+-------------------+----------------------+----------+
| Weight            | 125.6 kg (277 lb) | 2014  7:00 AM  |          |
|                   |                   | PDT                  |          |
+-------------------+-------------------+----------------------+----------+
| Height            | -                 | -                    |          |
+-------------------+-------------------+----------------------+----------+
| Body Mass Index   | 44.71             | 2014 12:53 PM  |          |
|                   |                   | PDT                  |          |
+-------------------+-------------------+----------------------+----------+
 documented in this encounter
 
 Medications at Time of Discharge
 
 
+----------------------+----------------------+-----------+---------+----------+-----------+
| Medication           | Sig                  | Dispensed | Refills | Start    | End Date  |
|                      |                      |           |         | Date     |           |
+----------------------+----------------------+-----------+---------+----------+-----------+
|   cholecalciferol    | Take 2,000 Units by  |           | 0       |          |           |
| (VITAMIN D-3) 2000   | mouth Every other    |           |         |          |           |
| UNITS                | day.                 |           |         |          |           |
| TABSIndications:     |                      |           |         |          |           |
| Unspecified          |                      |           |         |          |           |
 
| hypertensive kidney  |                      |           |         |          |           |
| disease with chronic |                      |           |         |          |           |
|  kidney disease      |                      |           |         |          |           |
| stage I through      |                      |           |         |          |           |
| stage IV, or         |                      |           |         |          |           |
| unspecified(403.90), |                      |           |         |          |           |
|  FSGS (focal         |                      |           |         |          |           |
| segmental            |                      |           |         |          |           |
| glomerulosclerosis), |                      |           |         |          |           |
|  Hyperlipidemia,     |                      |           |         |          |           |
| Complications of     |                      |           |         |          |           |
| transplanted kidney  |                      |           |         |          |           |
+----------------------+----------------------+-----------+---------+----------+-----------+
|   glucose blood VI   | Check blood sugar    |   100     | 12      | 20 |           |
| test strips (ONE     | before each meal and | each      |         | 12       |           |
| TOUCH ULTRA TEST)    |  as directed         |           |         |          |           |
| stripIndications:    |                      |           |         |          |           |
| Unspecified          |                      |           |         |          |           |
| hypertensive kidney  |                      |           |         |          |           |
| disease with chronic |                      |           |         |          |           |
|  kidney disease      |                      |           |         |          |           |
| stage I through      |                      |           |         |          |           |
| stage IV, or         |                      |           |         |          |           |
| unspecified(403.90), |                      |           |         |          |           |
|  Complications of    |                      |           |         |          |           |
| transplanted kidney, |                      |           |         |          |           |
|  Diabetes mellitus   |                      |           |         |          |           |
| type II,             |                      |           |         |          |           |
| uncontrolled (HCC),  |                      |           |         |          |           |
| Hyperlipidemia       |                      |           |         |          |           |
+----------------------+----------------------+-----------+---------+----------+-----------+
|   Respiratory        | Decrease CPAP to     |   1 each  | 0       | 20 |           |
| Therapy Supplies     | 12-18 cmH2O          |           |         | 13       |           |
| MISC                 | Diagnosis            |           |         |          |           |
|                      | Code(s)327.23.       |           |         |          |           |
|                      | Please send order to |           |         |          |           |
|                      |  [facility].     |           |         |          |           |
+----------------------+----------------------+-----------+---------+----------+-----------+
|   allopurinol        | Take 1 tablet by     |   30      | 11      | 02/10/20 |  |
| (ZYLOPRIM) 100 mg    | mouth Daily.         | tablet    |         | 14       | 5         |
| tablet               |                      |           |         |          |           |
+----------------------+----------------------+-----------+---------+----------+-----------+
|   aspirin 81 MG EC   | Take 81 mg by mouth  |           | 0       | 20 |  |
| tablet               | Daily.               |           |         | 12       | 5         |
+----------------------+----------------------+-----------+---------+----------+-----------+
|   cinacalcet         | Take 1 tablet by     |   30      | 12      | 20 |  |
| (SENSIPAR) 30 mg     | mouth Daily.         | tablet    |         | 13       | 4         |
| tablet               |                      |           |         |          |           |
+----------------------+----------------------+-----------+---------+----------+-----------+
|   fludrocortisone    | Take 1 tablet by     |   45      | 2       | 20 |  |
| (FLORINEF) 0.1 mg    | mouth Every other    | tablet    |         | 14       | 5         |
| tablet               | day.                 |           |         |          |           |
+----------------------+----------------------+-----------+---------+----------+-----------+
|   fluticasone        | Inhale 1 puff into   |   1       | 11      | 20 |  |
| (FLOVENT HFA) 220    | the lungs 2 times    | Inhaler   |         | 13       | 5         |
| mcg/puff inhaler     | daily. Rinse mouth   |           |         |          |           |
|                      | after use.           |           |         |          |           |
+----------------------+----------------------+-----------+---------+----------+-----------+
|   furosemide (LASIX) | Take 1 tablet by     |   30      | 5       | 20 |  |
|  40 mg tablet        | mouth Daily.         | tablet    |         | 13       | 4         |
 
+----------------------+----------------------+-----------+---------+----------+-----------+
|   insulin glargine   | Inject 20 Units      |   10 mL   | 0       | 20 | 07/31/201 |
| (LANTUS) 100         | under the skin every |           |         | 13       | 4         |
| units/mL             |  morning.            |           |         |          |           |
| injectionIndications |                      |           |         |          |           |
| : Unspecified        |                      |           |         |          |           |
| hypertensive kidney  |                      |           |         |          |           |
| disease with chronic |                      |           |         |          |           |
|  kidney disease      |                      |           |         |          |           |
| stage I through      |                      |           |         |          |           |
| stage IV, or         |                      |           |         |          |           |
| unspecified(403.90), |                      |           |         |          |           |
|  Complications of    |                      |           |         |          |           |
| transplanted kidney, |                      |           |         |          |           |
|  Diabetes mellitus   |                      |           |         |          |           |
| type II,             |                      |           |         |          |           |
| uncontrolled (HCC),  |                      |           |         |          |           |
| Hyperlipidemia       |                      |           |         |          |           |
+----------------------+----------------------+-----------+---------+----------+-----------+
|   insulin lispro     | Inject               |   10 pen  | 5       | 20 |  |
| (HUMALOG) 100        | subcutaneously       |           |         | 13       | 4         |
| units/mL injection   | before meals         |           |         |          |           |
|                      | according to sliding |           |         |          |           |
|                      |  scale               |           |         |          |           |
+----------------------+----------------------+-----------+---------+----------+-----------+
|   Insulin            | Use before meals and |   100     | 11      | 20 |  |
| Syringe-Needle U-100 |  as directed.        | each      |         | 13       | 4         |
|  (BD INSULIN SYRINGE |                      |           |         |          |           |
|  ULTRAFINE) 31G X    |                      |           |         |          |           |
| " 0.5 ML MISC    |                      |           |         |          |           |
+----------------------+----------------------+-----------+---------+----------+-----------+
|   levothyroxine      | Take 1 tablet by     |   30      | 11      | 20 | 10/06/201 |
| (LEVOTHROID) 50 mcg  | mouth Daily.         | tablet    |         | 13       | 4         |
| tablet               |                      |           |         |          |           |
+----------------------+----------------------+-----------+---------+----------+-----------+
|   lisinopril         | Take 1 tablet by     |   90      | 3       | 20 |  |
| (PRINIVIL,ZESTRIL)   | mouth Daily.         | tablet    |         | 13       | 4         |
| 30 MG tablet         |                      |           |         |          |           |
+----------------------+----------------------+-----------+---------+----------+-----------+
|   loperamide         | Take 2 mg by mouth   |           | 0       | 20 |  |
| (ANTI-DIARRHEAL) 2   | Daily as needed.     |           |         | 12       | 4         |
| mg capsule           |                      |           |         |          |           |
+----------------------+----------------------+-----------+---------+----------+-----------+
|   magnesium oxide    | Take 1 tablet by     |   62      | 12      | 20 |  |
| (MAG-OX) 400 mg      | mouth 2 times daily. | tablet    |         | 13       | 4         |
| tablet               |                      |           |         |          |           |
+----------------------+----------------------+-----------+---------+----------+-----------+
|   metoprolol         | Take 1 tablet by     |   60      | 11      | 20 |  |
| tartrate (LOPRESSOR) | mouth 2 times daily. | tablet    |         | 13       | 4         |
|  25 mg tablet        |                      |           |         |          |           |
+----------------------+----------------------+-----------+---------+----------+-----------+
|   mycophenolate      | Take 250 mg by mouth |           | 0       | 20 | 10/14/201 |
| (CELLCEPT) 250 mg    |  3 times daily.      |           |         | 11       | 5         |
| capsule              |                      |           |         |          |           |
+----------------------+----------------------+-----------+---------+----------+-----------+
|   omeprazole         | Take one capsule by  |   90      | 3       | 20 |  |
| (PRILOSEC) 20 mg     | mouth once daily on  | capsule   |         | 13       | 4         |
| capsule              | an empty stomach     |           |         |          |           |
+----------------------+----------------------+-----------+---------+----------+-----------+
|   predniSONE         | Take 1 tablet by     |   90      | 3       | 20 |  |
 
| (DELTASONE) 5 mg     | mouth Daily.         | tablet    |         | 14       | 5         |
| tablet               |                      |           |         |          |           |
+----------------------+----------------------+-----------+---------+----------+-----------+
|   Prenatal           | Take 0.8 mg by mouth |   30 each | 11      | 20 |  |
| Multivit-Min-Fe-FA   |  Daily.              |           |         | 13       | 4         |
| (PRENATAL VITAMINS)  |                      |           |         |          |           |
| 0.8 MG TABS          |                      |           |         |          |           |
+----------------------+----------------------+-----------+---------+----------+-----------+
|   Prenatal Vit-Fe    |                      |           | 0       | 20 |  |
| Fumarate-FA (PNV     |                      |           |         | 13       | 4         |
| PRENATAL PLUS        |                      |           |         |          |           |
| MULTIVITAMIN) 27-1   |                      |           |         |          |           |
| MG TABS              |                      |           |         |          |           |
+----------------------+----------------------+-----------+---------+----------+-----------+
|   rosuvastatin       | Take 1 tablet by     |   30      | 11      | 20 |  |
| (CRESTOR) 20 mg      | mouth nightly.       | tablet    |         | 13       | 4         |
| tablet               |                      |           |         |          |           |
+----------------------+----------------------+-----------+---------+----------+-----------+
|   tacrolimus         | TAD.                 |           | 0       | 20 | 07/15/201 |
| (PROGRAF) 0.5 mg     |                      |           |         | 12       | 4         |
| capsuleIndications:  |                      |           |         |          |           |
| Unspecified          |                      |           |         |          |           |
| hypertensive kidney  |                      |           |         |          |           |
| disease with chronic |                      |           |         |          |           |
|  kidney disease      |                      |           |         |          |           |
| stage I through      |                      |           |         |          |           |
| stage IV, or         |                      |           |         |          |           |
| unspecified(403.90), |                      |           |         |          |           |
|  Complications of    |                      |           |         |          |           |
| transplanted kidney, |                      |           |         |          |           |
|  Diabetes mellitus   |                      |           |         |          |           |
| type II,             |                      |           |         |          |           |
| uncontrolled (HCC),  |                      |           |         |          |           |
| Hyperlipidemia       |                      |           |         |          |           |
+----------------------+----------------------+-----------+---------+----------+-----------+
|   tacrolimus         | Take 1.5 mg by mouth |           | 0       | 20 |  |
| (PROGRAF) 1 mg       |  2 times daily.      |           |         | 13       | 4         |
| capsuleIndications:  |                      |           |         |          |           |
| Unspecified          |                      |           |         |          |           |
| hypertensive kidney  |                      |           |         |          |           |
| disease with chronic |                      |           |         |          |           |
|  kidney disease      |                      |           |         |          |           |
| stage I through      |                      |           |         |          |           |
| stage IV, or         |                      |           |         |          |           |
| unspecified(403.90), |                      |           |         |          |           |
|  FSGS (focal         |                      |           |         |          |           |
| segmental            |                      |           |         |          |           |
| glomerulosclerosis), |                      |           |         |          |           |
|  Hyperlipidemia,     |                      |           |         |          |           |
| Complications of     |                      |           |         |          |           |
| transplanted kidney  |                      |           |         |          |           |
+----------------------+----------------------+-----------+---------+----------+-----------+
|   valsartan (DIOVAN) | Take 1 tablet by     |   30      | 11      | 20 |  |
|  160 mg tablet       | mouth Daily.         | tablet    |         | 14       | 4         |
+----------------------+----------------------+-----------+---------+----------+-----------+
 documented as of this encounter
 
 Plan of Treatment
 
 
 
+--------+-----------+------------+----------------------+-------------------+
| Date   | Type      | Specialty  | Care Team            | Description       |
+--------+-----------+------------+----------------------+-------------------+
| / | Office    | Cardiology |   Tonia Wilson,    |                   |
|    | Visit     |            | ARNP  [address]   |                   |
|        |           |            | [place]  |                   |
|        |           |            | [address]  [phone]  |                   |
|        |           |            |  [phone] (Fax)  |                   |
+--------+-----------+------------+----------------------+-------------------+
| / | Hospital  | Radiology  |   Popeye Townsend, |                   |
|    | Encounter |            |  MD  [address] |                   |
|        |           |            |  [place],   |                   |
|        |           |            | [place] [address]             |                   |
|        |           |            | [phone]         |                   |
|        |           |            | [phone] (Fax)   |                   |
+--------+-----------+------------+----------------------+-------------------+
| / | Surgery   | Radiology  |   Popeye Townsend, | CV EP PPM SYSTEM  |
|    |           |            |  MD  [address] | IMPLANT           |
|        |           |            |  St.  [place],   |                   |
|        |           |            | [place] [address]             |                   |
|        |           |            | [phone]         |                   |
|        |           |            | [phone] (Fax)   |                   |
+--------+-----------+------------+----------------------+-------------------+
| 02/10/ | Clinical  | Cardiology |                      |                   |
|    | Support   |            |                      |                   |
+--------+-----------+------------+----------------------+-------------------+
| / | Office    | Cardiology |   Tonia Wilson,    |                   |
|    | Visit     |            | ARNP  [address]   |                   |
|        |           |            | [place]  |                   |
|        |           |            | [address]  [phone]  |                   |
|        |           |            |  [phone] (Fax)  |                   |
+--------+-----------+------------+----------------------+-------------------+
| / | Off-Site  | Nephrology |   Marzena Moser  |                   |
|    | Visit     |            | DO ETIENNE  [address] [place] [address]      |                   |
|        |           |            | [address]      |                   |
|        |           |            | [place]      |                   |
|        |           |            | [address]  [phone]  |                   |
|        |           |            |  [phone] (Fax)  |                   |
+--------+-----------+------------+----------------------+-------------------+
 documented as of this encounter
 
 Procedures
 
 
+----------------------+--------+-------------+----------------------+----------------------
+
| Procedure Name       | Priori | Date/Time   | Associated Diagnosis | Comments             
|
|                      | ty     |             |                      |                      
|
+----------------------+--------+-------------+----------------------+----------------------
+
| NM NUCLEAR STRESS    | Routin | 2014  |   Other chest pain   |   Results for this   
|
| TEST (PHARMACOLOGIC  | e      | 11:56 AM    | CAD (coronary artery | procedure are in the 
|
| - VASODILATOR)       |        | PDT         |  disease)  Pre-op    |  results section.    
|
|                      |        |             | evaluation           |                      
|
 
+----------------------+--------+-------------+----------------------+----------------------
+
 documented in this encounter
 
 Results
 NM Nuclear Stress Test (Vasodilator) (2014 11:56 AM PDT)
 
+----------+
| Specimen |
+----------+
|          |
+----------+
 
 
 
+------------------------------------------------------------------------+-----------------+
| Impressions                                                            | Performed At    |
+------------------------------------------------------------------------+-----------------+
|      1.    Persantine EKG is negative.  2.   Normal   Persantine       |   [facility]    |
| Sestamibi myocardial perfusion study with a normal   left ventricular  | [facility]        |
| size and wall thickness.   Preserved left ventricular   systolic       | [facility]  |
| function.   LVEF by gated SPECT 78%.              Signed by: Popeye    | - IMAGING       |
| MD Cary [facility]     2014, 12:12                                |                 |
+------------------------------------------------------------------------+-----------------+
 
 
 
+------------------------------------------------------------------------+-----------------+
| Narrative                                                              | Performed At    |
+------------------------------------------------------------------------+-----------------+
|                     NUCLEAR MEDICINE STRESS TEST REPORT                |   [facility]    |
| Patient Name: Viviana Ricci   Study Date:   2014   Primary Care  | [facility]        |
| Provider: Marzena Moser DO   MRN:   [id number]   :           | [facility]  |
|   1945 Age:   68 y.o. Gender: female      CLINICAL               | - IMAGING       |
| HISTORY/DIAGNOSIS:   Chest pain      PERSANTINE SESTAMIBI STRESS TEST  |                 |
|  Indication:  Chest pain      Procedure:   In the supine position, 60  |                 |
| mg of   Persantine was infused intravenously   over 4 minutes.   Blood |                 |
|  pressure and EKG were monitored every 1 minute.   5   mL of normal    |                 |
| saline was utilized to flush the IV line.   2.5 minutes later,   10.2  |                 |
| mCi sestamibi intravenous injection.   SPECT myocardial perfusion      |                 |
| imaging was acquired with wall motion analysis.   Rest imaging was     |                 |
| performed using 31.8 mCi Sestamibi intravenous injection.   Repeated   |                 |
| SPECT   myocardial perfusion imaging was acquired with wall motion     |                 |
| analysis.   At   the end of the procedure, 75 mg of aminophylline was  |                 |
| infused   intravenously.     Hemodynamics:     Heart rate baseline 85  |                 |
| beats per minute, peak 90 beats per minute.   Blood   pressure         |                 |
| baseline 108/70 mmHg, peak 100/72mmHg.   EKG baseline underlying       |                 |
| sinus rhythm.   Normal EKG.   Peak unchanged.     Side Effects:        |                 |
|   None.     Arrhythmia:   None.     Persantine sestamibi Myocardial    |                 |
| Perfusion Imaging Result:          The Persantine Sestamibi            |                 |
| tomographic images, reviewed without the   attenuation compensation    |                 |
| resolution, revealed a normal myocardial   perfusion pattern as seen   |                 |
| in short axis, vertical long axis, and   horizontal long axis          |                 |
| projections. The left ventricular cavity is normal.   The rest imaging |                 |
|  is also normal.               Gated SPECT reveals a normal left       |                 |
| ventricular wall thickness and motion.     Preserved left ventricular  |                 |
| systolic function. LVEF by gated SPECT is 78%.                         |                 |
+------------------------------------------------------------------------+-----------------+
 
 
 
 
+-------------------------------------------------------------------------------------------
--------------------------------------------------------------------------------------------
----------------+
| Procedure Note                                                                            
                                                                                            
                |
+-------------------------------------------------------------------------------------------
--------------------------------------------------------------------------------------------
----------------+
|   Popeye Townsend MD - 2014  1:03 PM PDT  Formatting of this note might be       
                                                                                            
                |
| different from the original.     NUCLEAR MEDICINE STRESS TEST REPORT Patient Name: Viviana   
                                                                                            
                |
| Deedee Ricci  Study Date:  2014 Primary Care Provider: Marzena Moser DO  MRN:     
                                                                                            
                |
| [id number] :  1945 Age:  68 y.o. Gender: female   CLINICAL HISTORY/DIAGNOSIS:   
                                                                                            
                |
|  Chest pain PERSANTINE SESTAMIBI STRESS TESTIndication:Chest pain Procedure: In the       
                                                                                            
                |
| supine position, 60 mg of  Persantine was infused intravenously over 4 minutes.  Blood    
                                                                                            
                |
| pressure and EKG were monitored every 1 minute.  5 mL of normal saline was utilized to    
                                                                                            
                |
| flush the IV line.  2.5 minutes later, 10.2 mCi sestamibi intravenous injection.  SPECT   
                                                                                            
                |
| myocardial perfusion imaging was acquired with wall motion analysis.  Rest imaging was    
                                                                                            
                |
| performed using 31.8 mCi Sestamibi intravenous injection.  Repeated SPECT myocardial      
                                                                                            
                |
| perfusion imaging was acquired with wall motion analysis.  At the end of the procedure,   
                                                                                            
                |
| 75 mg of aminophylline was infused intravenously.Hemodynamics:  Heart rate baseline 85    
                                                                                            
                |
| beats per minute, peak 90 beats per minute.  Blood pressure baseline 108/70 mmHg, peak    
                                                                                            
                |
| 100/72mmHg.  EKG baseline underlying sinus rhythm.  Normal EKG.  Peak unchanged.Side      
                                                                                            
                |
| Effects:  None.Arrhythmia:  None.Persantine sestamibi Myocardial Perfusion Imaging        
                                                                                            
                |
| Result:      The Persantine Sestamibi tomographic images, reviewed without the            
                                                                                            
                |
| attenuation compensation resolution, revealed a normal myocardial perfusion pattern as    
                                                                                            
 
                |
| seen in short axis, vertical long axis, and horizontal long axis projections. The left    
                                                                                            
                |
| ventricular cavity is normal. The rest imaging is also normal.        Gated SPECT         
                                                                                            
                |
| reveals a normal left ventricular wall thickness and motion.  Preserved left ventricular  
                                                                                            
                |
|  systolic function. LVEF by gated SPECT is 78%.   IMPRESSION: 1.   Persantine EKG is      
                                                                                            
                |
| negative.2.  Normal  Persantine Sestamibi myocardial perfusion study with a normal left   
                                                                                            
                |
| ventricular size and wall thickness.  Preserved left ventricular systolic function.       
                                                                                            
                |
| LVEF by gated SPECT 78%.Signed by: Popeye Townsend MD [facility]  2014, 12:12            
                                                                                            
                |
|                                                                                           
                                                                                            
                |
|Side Effects:  None.                                                                       
                                                                                            
                |
|                                                                                           
                                                                                            
                |
|Arrhythmia:  None.                                                                         
                                                                                            
                |
|                                                                                           
                                                                                            
                |
|Persantine sestamibi Myocardial Perfusion Imaging Result:                                  
                                                                                            
                |
| The Persantine Sestamibi tomographic images, reviewed without the attenuation compensation
 resolution, revealed a normal myocardial perfusion pattern as seen in short axis, vertical 
long axis, and  |
|horizontal long axis projections. The left ventricular cavity is normal. The rest imaging i
s also normal.                                                                              
                |
|                                                                                           
                                                                                            
                |
| Gated SPECT reveals a normal left ventricular wall thickness and motion.  Preserved left v
entricular systolic function. LVEF by gated SPECT is 78%.                                   
                |
|                                                                                           
                                                                                            
                |
|IMPRESSION:                                                                                
                                                                                            
                |
|                                                                                           
                                                                                            
 
                |
|1.   Persantine EKG is negative.                                                           
                                                                                            
                |
|2.  Normal  Persantine Sestamibi myocardial perfusion study with a normal left ventricular 
size and wall thickness.  Preserved left ventricular systolic function.  LVEF by gated SPECT
 78%.           |
|                                                                                           
                                                                                            
                |
|                                                                                           
                                                                                            
                |
|                                                                                           
                                                                                            
                |
|                                                                                           
                                                                                            
                |
|Signed by: Popeye Townsend MD [facility]                                                       
                                                                                            
                |
|  2014, 12:12                                                                         
                                                                                            
                |
+-------------------------------------------------------------------------------------------
--------------------------------------------------------------------------------------------
----------------+
 
 
 
+----------------------+---------------------+--------------------+----------------+
| Performing           | Address             | City/State/Zipcode | Phone Number   |
| Organization         |                     |                    |                |
+----------------------+---------------------+--------------------+----------------+
|   [facility]     |   [address] | [place]    |   [phone] |
| [facility]  |                     | [address]              |                |
| - IMAGING            |                     |                    |                |
+----------------------+---------------------+--------------------+----------------+
 documented in this encounter
 
 Visit Diagnoses
 
 
+------------------------------------------------------------------------------------------+
| Diagnosis                                                                                |
+------------------------------------------------------------------------------------------+
|   Other chest pain                                                                       |
+------------------------------------------------------------------------------------------+
|   CAD (coronary artery disease)  Coronary atherosclerosis of unspecified type of vessel, |
|  native or graft                                                                         |
+------------------------------------------------------------------------------------------+
|   Pre-op evaluation  Preoperative examination, unspecified                               |
+------------------------------------------------------------------------------------------+
 documented in this encounter
 
 Administered Medications
 
 
+-----------------------------------+--------+----------+-------+------+------+
 
| Medication Order                  | MAR    | Action   | Dose  | Rate | Site |
|                                   | Action | Date     |       |      |      |
+-----------------------------------+--------+----------+-------+------+------+
|   aminophylline injection 75 mg   | Given  | 20 | 75 mg |      |      |
| 75 mg, Intravenous, ONCE PRN,     |        | 14  8:26 |       |      |      |
| relieve symptoms, Starting Fri    |        |  AM PDT  |       |      |      |
| 14 at 0825, For 1 dose,      |        |          |       |      |      |
| Nuclear Medicine                  |        |          |       |      |      |
+-----------------------------------+--------+----------+-------+------+------+
 
 
 
+---+---+
|   |   |
+---+---+
 
 
 
+-----------------------------------+-------+----------+----------+--------+---+
|   dipyridamole (PERSANTINE) 60mg  | Given | 20 | 17.8352  | 713.4  |   |
| in 40 mL NS syringe  0.142        |       | 14  8:45 | mg/min   | mL/hr  |   |
| mg/kg/min                         |       |  AM PDT  |          |        |   |
|  125.6 kg (rounded to 713.4       |       |          |          |        |   |
| mL/hr), Intravenous, Administer   |       |          |          |        |   |
| over 4 Minutes, ONCE, Fri 14 |       |          |          |        |   |
|  at 0845, For 1 dose, Nuclear     |       |          |          |        |   |
| Medicine                          |       |          |          |        |   |
+-----------------------------------+-------+----------+----------+--------+---+
 
 
 
+---+---+
|   |   |
+---+---+
 
 
 
+-----------------------------------+-------+----------+----------+---+---+
|   technetium TC-99M sestamibi     | Given | 20 | 10.2     |   |   |
| (CARDIOLITE) injection 9          |       | 14  8:26 | -millicu |   |   |
| millicurie  9 -millicurie,        |       |  AM PDT  | perla      |   |   |
| Intravenous, ONCE PRN, Other,     |       |          |          |   |   |
| Starting 14 at 0826, For |       |          |          |   |   |
|  1 dose, Nuclear Medicine         |       |          |          |   |   |
+-----------------------------------+-------+----------+----------+---+---+
 
 
 
+---+---+
|   |   |
+---+---+
 documented in this encounter

## 2020-01-08 NOTE — XMS
Clinical Summary
  Created on: 2020
 
 Viviana Ricci
 : 47
 Sex: Female
 
 Demographics
 
 
+-----------------------+------------------------+
| Address               | [address]           |
|                       | [place]  [address]   |
+-----------------------+------------------------+
| Home Phone            | [phone]        |
+-----------------------+------------------------+
| Preferred Language    | Unknown                |
+-----------------------+------------------------+
| Marital Status        | Single                 |
+-----------------------+------------------------+
| [employer] Affiliation | CAT                    |
+-----------------------+------------------------+
| Race                  | White                  |
+-----------------------+------------------------+
| Ethnic Group          | Not  or  |
+-----------------------+------------------------+
 
 
 Author
 
 
+--------------+-------------+
| Organization | Unknown     |
+--------------+-------------+
| Address      | Unknown     |
+--------------+-------------+
| Phone        | Unavailable |
+--------------+-------------+
 
 
 
 Support
 
 
+---------------+--------------+-----------------+-----------------+
| Name          | Relationship | Address         | Phone           |
+---------------+--------------+-----------------+-----------------+
| Ember Ricci | [address]         | [place]   | [phone] |
+---------------+--------------+-----------------+-----------------+
 
 
 
 Care Team Providers
 
 
+-----------------------+------+-------------+
| Care Team Member Name | Role | Phone       |
+-----------------------+------+-------------+
 
 PCP  | Unavailable |
+-----------------------+------+-------------+
 
 
 
 Source Comments
 [facility] is fully live on both [facility] and [facility]
 
 Allergies
 Not on File
 
 Medications
 Not on file
 
 Active Problems
 Not on file
 
 Social History
 
 
+----------------+-------+-----------+--------+------+
| Tobacco Use    | Types | Packs/Day | Years  | Date |
|                |       |           | Used   |      |
+----------------+-------+-----------+--------+------+
| Never Assessed |       |           |        |      |
+----------------+-------+-----------+--------+------+
 
 
 
+------------------+---------------+
| Sex Assigned at  | Date Recorded |
| Birth            |               |
+------------------+---------------+
| Not on file      |               |
+------------------+---------------+
 
 
 
+----------------+-------------+-------------+
| Job Start Date | Occupation  | Industry    |
+----------------+-------------+-------------+
| Not on file    | Not on file | Not on file |
+----------------+-------------+-------------+
 
 
 
+----------------+--------------+------------+
| Travel History | Travel Start | Travel End |
+----------------+--------------+------------+
 
 
 
+-------------------------------------+
| No recent travel history available. |
+-------------------------------------+
 
 
 
 Last Filed Vital Signs
 
 Not on file
 
 Plan of Treatment
 
 
+----------------------+-----------+-----------+----------+
| Health Maintenance   | Due Date  | Last Done | Comments |
+----------------------+-----------+-----------+----------+
| Pneumococcal         |  |           |          |
| vaccination (1 of 2  | 1         |           |          |
| - PCV13)             |           |           |          |
+----------------------+-----------+-----------+----------+
| Influenza (Flu)      | 10/01/201 |           |          |
| vaccination (#1)     | 9         |           |          |
+----------------------+-----------+-----------+----------+
 
 
 
 Results
 Not on filefrom Last 3 Months

## 2020-01-08 NOTE — XMS
Encounter Summary
  Created on: 2020
 
 Viviana Ricci
 External Reference #: [id number]
 : 47
 Sex: Female
 
 Demographics
 
 
+-----------------------+----------------------+
| Address               | [address]      |
|                       | [place]  [address] |
+-----------------------+----------------------+
| Home Phone            | [phone]      |
+-----------------------+----------------------+
| Preferred Language    | Unknown              |
+-----------------------+----------------------+
| Marital Status        | Single               |
+-----------------------+----------------------+
| [employer] Affiliation | 1009                 |
+-----------------------+----------------------+
| Race                  | Unknown              |
+-----------------------+----------------------+
| Ethnic Group          | Unknown              |
+-----------------------+----------------------+
 
 
 Author
 
 
+--------------+--------------------------------------------+
| Author       | [facility]  |
|              | and [place]                                |
+--------------+--------------------------------------------+
| Organization | [facility]  |
|              | and [place]                                |
+--------------+--------------------------------------------+
| Address      | Unknown                                    |
+--------------+--------------------------------------------+
| Phone        | Unavailable                                |
+--------------+--------------------------------------------+
 
 
 
 Support
 
 
+----------------+--------------+---------------------+-----------------+
| Name           | Relationship | Address             | Phone           |
+----------------+--------------+---------------------+-----------------+
| Ada/Ed Radhames | ECON         | [address]              | [phone] |
|                |              | [place]  |                 |
|                |              |  [address]              |                 |
+----------------+--------------+---------------------+-----------------+
 
 
 
 
 Care Team Providers
 
 
+------------------------+------+-----------------+
| Care Team Member Name  | Role | Phone           |
+------------------------+------+-----------------+
| Marzena Moser DO | PCP  | [phone] |
+------------------------+------+-----------------+
 
 
 
 Reason for Visit
 
 
+-------------------+----------+
| Reason            | Comments |
+-------------------+----------+
| Medication Orders |          |
+-------------------+----------+
 
 
 
 Encounter Details
 
 
+--------+-----------+---------------------+----------------------+-------------------+
| Date   | Type      | Department          | Care Team            | Description       |
+--------+-----------+---------------------+----------------------+-------------------+
| 10/19/ | Telephone |   [facility]         |   Marzena Moser  | Medication Orders |
| 2018   |           | NEPHROLOGY  [address]   | M, DO  [address]      |                   |
|        |           | [address]   | [address]      |                   |
|        |           | [place], [place]     | [place], [place]      |                   |
|        |           | [address]          | [address]  [phone]  |                   |
|        |           | [phone]        |  [phone] (Fax)  |                   |
+--------+-----------+---------------------+----------------------+-------------------+
 
 
 
 Social History
 
 
+--------------+-------+-----------+--------+------+
| Tobacco Use  | Types | Packs/Day | Years  | Date |
|              |       |           | Used   |      |
+--------------+-------+-----------+--------+------+
| Never Smoker |       |           |        |      |
+--------------+-------+-----------+--------+------+
 
 
 
+---------------------+---+---+---+
| Smokeless Tobacco:  |   |   |   |
| Never Used          |   |   |   |
+---------------------+---+---+---+
 
 
 
+---------------------------------------------------------------+
| Comments: some second hand smoke exposure, but fairly minimal |
 
+---------------------------------------------------------------+
 
 
 
+-------------+-------------+---------+----------+
| Alcohol Use | Drinks/Week | oz/Week | Comments |
+-------------+-------------+---------+----------+
| No          |             |         |          |
+-------------+-------------+---------+----------+
 
 
 
+------------------+---------------+
| Sex Assigned at  | Date Recorded |
| Birth            |               |
+------------------+---------------+
| Not on file      |               |
+------------------+---------------+
 
 
 
+----------------+-------------+-------------+
| Job Start Date | Occupation  | Industry    |
+----------------+-------------+-------------+
| Not on file    | Not on file | Not on file |
+----------------+-------------+-------------+
 
 
 
+----------------+--------------+------------+
| Travel History | Travel Start | Travel End |
+----------------+--------------+------------+
 
 
 
+-------------------------------------+
| No recent travel history available. |
+-------------------------------------+
 documented as of this encounter
 
 Plan of Treatment
 
 
+--------+-----------+------------+----------------------+-------------------+
| Date   | Type      | Specialty  | Care Team            | Description       |
+--------+-----------+------------+----------------------+-------------------+
| / | Office    | Cardiology |   Tonia Wilson,    |                   |
|    | Visit     |            | ARNJESSICA  [address]   |                   |
|        |           |            | [place], [place]  |                   |
|        |           |            | [address]  [phone]  |                   |
|        |           |            |  [phone] (Fax)  |                   |
+--------+-----------+------------+----------------------+-------------------+
| / | Hospital  | Radiology  |   Popeye Townsend, |                   |
|    | Encounter |            |  MD  [address] |                   |
|        |           |            |  [place],   |                   |
|        |           |            | [place] [address]             |                   |
|        |           |            | [phone]         |                   |
|        |           |            | [phone] (Fax)   |                   |
+--------+-----------+------------+----------------------+-------------------+
| / | Surgery   | Radiology  |   Popeye Townsend, | CV EP PPM SYSTEM  |
 
|    |           |            |  MD  [address] | IMPLANT           |
|        |           |            |  St.  [place],   |                   |
|        |           |            | [place] [address]             |                   |
|        |           |            | [phone]         |                   |
|        |           |            | [phone] (Fax)   |                   |
+--------+-----------+------------+----------------------+-------------------+
| 02/10/ | Clinical  | Cardiology |                      |                   |
|    | Support   |            |                      |                   |
+--------+-----------+------------+----------------------+-------------------+
| / | Office    | Cardiology |   Tonia Wilson,    |                   |
|    | Visit     |            | BELKIS  [address]   |                   |
|        |           |            | [place]  |                   |
|        |           |            | [address] [phone]  |                   |
|        |           |            |  [phone] (Fax)  |                   |
+--------+-----------+------------+----------------------+-------------------+
| / | Off-Site  | Nephrology |   Marzena Moser  |                   |
|    | Visit     |            | DO ETIENNE  [address] [place] [address]      |                   |
|        |           |            | [address]      |                   |
|        |           |            | [place]      |                   |
|        |           |            | [address] [phone]  |                   |
|        |           |            |  [phone] (Fax)  |                   |
+--------+-----------+------------+----------------------+-------------------+
 documented as of this encounter
 
 Visit Diagnoses
 Not on filedocumented in this encounter

## 2020-01-08 NOTE — XMS
Encounter Summary
  Created on: 2020
 
 Viviana Ricci
 External Reference #: [id number]
 : 47
 Sex: Female
 
 Demographics
 
 
+-----------------------+----------------------+
| Address               | [address]      |
|                       | [place]  [address] |
+-----------------------+----------------------+
| Home Phone            | [phone]      |
+-----------------------+----------------------+
| Preferred Language    | Unknown              |
+-----------------------+----------------------+
| Marital Status        | Single               |
+-----------------------+----------------------+
| [employer] Affiliation | 1009                 |
+-----------------------+----------------------+
| Race                  | Unknown              |
+-----------------------+----------------------+
| Ethnic Group          | Unknown              |
+-----------------------+----------------------+
 
 
 Author
 
 
+--------------+--------------------------------------------+
| Author       | [facility]  |
|              | and [place]                                |
+--------------+--------------------------------------------+
| Organization | [facility]  |
|              | and [place]                                |
+--------------+--------------------------------------------+
| Address      | Unknown                                    |
+--------------+--------------------------------------------+
| Phone        | Unavailable                                |
+--------------+--------------------------------------------+
 
 
 
 Support
 
 
+----------------+--------------+---------------------+-----------------+
| Name           | Relationship | Address             | Phone           |
+----------------+--------------+---------------------+-----------------+
| Ada/Ed Radhames | ECON         | [address]              | [phone] |
|                |              | [place]  |                 |
|                |              |  [address]              |                 |
+----------------+--------------+---------------------+-----------------+
 
 
 
 
 Care Team Providers
 
 
+-----------------------+------+-------------+
| Care Team Member Name | Role | Phone       |
+-----------------------+------+-------------+
 PCP  | Unavailable |
+-----------------------+------+-------------+
 
 
 
 Encounter Details
 
 
+--------+-----------+----------------------+----------------------+-------------+
| Date   | Type      | Department           | Care Team            | Description |
+--------+-----------+----------------------+----------------------+-------------+
| / | [facility]  |   [facility] |   Marzena Moser  |             |
|    | Encounter |  MED CTR XRAY  [address] | M, DO  [address]      |             |
|        |           |  [place]       | [address]      |             |
|        |           | [place] [address] | [place]      |             |
|        |           |   [phone]       | [address] [phone]  |             |
|        |           |                      |  [phone] (Fax)  |             |
+--------+-----------+----------------------+----------------------+-------------+
 
 
 
 Social History
 
 
+----------------+-------+-----------+--------+------+
| Tobacco Use    | Types | Packs/Day | Years  | Date |
|                |       |           | Used   |      |
+----------------+-------+-----------+--------+------+
| Never Assessed |       |           |        |      |
+----------------+-------+-----------+--------+------+
 
 
 
+------------------+---------------+
| Sex Assigned at  | Date Recorded |
| Birth            |               |
+------------------+---------------+
| Not on file      |               |
+------------------+---------------+
 
 
 
+----------------+-------------+-------------+
| Job Start Date | Occupation  | Industry    |
+----------------+-------------+-------------+
| Not on file    | Not on file | Not on file |
+----------------+-------------+-------------+
 
 
 
+----------------+--------------+------------+
| Travel History | Travel Start | Travel End |
+----------------+--------------+------------+
 
 
 
 
+-------------------------------------+
| No recent travel history available. |
+-------------------------------------+
 documented as of this encounter
 
 Plan of Treatment
 
 
+--------+-----------+------------+----------------------+-------------------+
| Date   | Type      | Specialty  | Care Team            | Description       |
+--------+-----------+------------+----------------------+-------------------+
| / | Office    | Cardiology |   Tonia Wilson,    |                   |
|    | Visit     |            | ARNJESSICA  [address]   |                   |
|        |           |            | St  [place], [place]  |                   |
|        |           |            | [address]  [phone]  |                   |
|        |           |            |  [phone] (Fax)  |                   |
+--------+-----------+------------+----------------------+-------------------+
| / | Hospital  | Radiology  |   Popeye Townsend, |                   |
|    | Encounter |            |  MD  [address] |                   |
|        |           |            |  St. [place],   |                   |
|        |           |            | [place] [address]             |                   |
|        |           |            | [phone]         |                   |
|        |           |            | [phone] (Fax)   |                   |
+--------+-----------+------------+----------------------+-------------------+
| / | Surgery   | Radiology  |   Popeye Townsend, | CV EP PPM SYSTEM  |
|    |           |            |  MD  [address] | IMPLANT           |
|        |           |            |  [place],   |                   |
|        |           |            | [place] [address]             |                   |
|        |           |            | [phone]         |                   |
|        |           |            | [phone] (Fax)   |                   |
+--------+-----------+------------+----------------------+-------------------+
| 02/10/ | Clinical  | Cardiology |                      |                   |
|    | Support   |            |                      |                   |
+--------+-----------+------------+----------------------+-------------------+
| / | Office    | Cardiology |   Tonia Wilson,    |                   |
|    | Visit     |            | BELKIS  [address]   |                   |
|        |           |            | [place]  |                   |
|        |           |            | [address]  [phone]  |                   |
|        |           |            |  [phone] (Fax)  |                   |
+--------+-----------+------------+----------------------+-------------------+
| / | Off-Site  | Nephrology |   Marzena Moser  |                   |
|    | Visit     |            | DO TEIENNE  [address] [place] [address]      |                   |
|        |           |            | [address]      |                   |
|        |           |            | [place]      |                   |
|        |           |            | [address] [phone]  |                   |
|        |           |            |  [phone] (Fax)  |                   |
+--------+-----------+------------+----------------------+-------------------+
 documented as of this encounter
 
 Visit Diagnoses
 Not on filedocumented in this encounter

## 2020-01-08 NOTE — XMS
Encounter Summary
  Created on: 2020
 
 Viviana Ricci
 External Reference #: [id number]
 : 47
 Sex: Female
 
 Demographics
 
 
+-----------------------+----------------------+
| Address               | [address]      |
|                       | [place]  [address] |
+-----------------------+----------------------+
| Home Phone            | [phone]      |
+-----------------------+----------------------+
| Preferred Language    | Unknown              |
+-----------------------+----------------------+
| Marital Status        | Single               |
+-----------------------+----------------------+
| Zoroastrian Affiliation | [id number]                 |
+-----------------------+----------------------+
| Race                  | Unknown              |
+-----------------------+----------------------+
| Ethnic Group          | Unknown              |
+-----------------------+----------------------+
 
 
 Author
 
 
+--------------+--------------------------------------------+
| Author       | [facility]  |
|              | [facility]                                |
+--------------+--------------------------------------------+
| Organization | [facility]  |
|              | and [facility]                                |
+--------------+--------------------------------------------+
| Address      | Unknown                                    |
+--------------+--------------------------------------------+
| Phone        | Unavailable                                |
+--------------+--------------------------------------------+
 
 
 
 Support
 
 
+----------------+--------------+---------------------+-----------------+
| Name           | Relationship | Address             | Phone           |
+----------------+--------------+---------------------+-----------------+
| Ada/Ed Radhames | ECON         | [address]              | [phone] |
|                |              | [place]  |                 |
|                |              |  [id number]              |                 |
+----------------+--------------+---------------------+-----------------+
 
 
 
 
 Care Team Providers
 
 
+-----------------------+------+-------------+
| Care Team Member Name | Role | Phone       |
+-----------------------+------+-------------+
 PCP  | Unavailable |
+-----------------------+------+-------------+
 
 
 
 Encounter Details
 
 
+--------+----------+---------------------+----------------------+-------------+
| Date   | Type     | Department          | Care Team            | Description |
+--------+----------+---------------------+----------------------+-------------+
| 10/19/ | Abstract |   [facility] [place]         |   Marzena Moser  |             |
|    |          | NEPHROLOGY  [address]   | M, DO  [address]      |             |
|        |          | [address]   | [address]      |             |
|        |          | [place], [place]     | [place]      |             |
|        |          | [address]          | [address]  [phone]  |             |
|        |          | [phone]        |  [phone] (Fax)  |             |
+--------+----------+---------------------+----------------------+-------------+
 
 
 
 Social History
 
 
+--------------+-------+-----------+--------+------+
| Tobacco Use  | Types | Packs/Day | Years  | Date |
|              |       |           | Used   |      |
+--------------+-------+-----------+--------+------+
| Never Smoker |       |           |        |      |
+--------------+-------+-----------+--------+------+
 
 
 
+---------------------+---+---+---+
| Smokeless Tobacco:  |   |   |   |
| Never Used          |   |   |   |
+---------------------+---+---+---+
 
 
 
+---------------------------------------------------------------+
| Comments: some second hand smoke exposure, but fairly minimal |
+---------------------------------------------------------------+
 
 
 
+-------------+-------------+---------+----------+
| Alcohol Use | Drinks/Week | oz/Week | Comments |
+-------------+-------------+---------+----------+
| No          |             |         |          |
+-------------+-------------+---------+----------+
 
 
 
 
+------------------+---------------+
| Sex Assigned at  | Date Recorded |
| Birth            |               |
+------------------+---------------+
| Not on file      |               |
+------------------+---------------+
 
 
 
+----------------+-------------+-------------+
| Job Start Date | Occupation  | Industry    |
+----------------+-------------+-------------+
| Not on file    | Not on file | Not on file |
+----------------+-------------+-------------+
 
 
 
+----------------+--------------+------------+
| Travel History | Travel Start | Travel End |
+----------------+--------------+------------+
 
 
 
+-------------------------------------+
| No recent travel history available. |
+-------------------------------------+
 documented as of this encounter
 
 Progress Notes
 Cherelle Julian RN - 10/19/2015  2:07 PM PDTPatient says she has been treated twice 
lately for a UTI.  She finished her last antibiotics on about 10/12/15.  Patient does not ha
ve burning or pain as she had, denies urgency.  All symptoms have cleared expect low back pa
in.Electronically signed by Cherelle Julian RN at 10/19/2015  2:14 PM PDTdocumented in
 this encounter
 
 Plan of Treatment
 
 
+--------+-----------+------------+----------------------+-------------------+
| Date   | Type      | Specialty  | Care Team            | Description       |
+--------+-----------+------------+----------------------+-------------------+
| / | Office    | Cardiology |   Tonia Wilson,    |                   |
|    | Visit     |            | ARNJESSICA  [address]   |                   |
|        |           |            | St  [place], [place]  |                   |
|        |           |            | [address]  [phone]  |                   |
|        |           |            |  [phone] (Fax)  |                   |
+--------+-----------+------------+----------------------+-------------------+
| / | Hospital  | Radiology  |   Popeye Townsend, |                   |
|    | Encounter |            |  MD  [address] |                   |
|        |           |            |  St.  [place],   |                   |
|        |           |            | [place] [address]             |                   |
|        |           |            | [phone]         |                   |
|        |           |            | [phone] (Fax)   |                   |
+--------+-----------+------------+----------------------+-------------------+
| / | Surgery   | Radiology  |   Popeye Townsend, | CV EP PPM SYSTEM  |
|    |           |            |  MD  [address] | IMPLANT           |
|        |           |            |  St.  [place],   |                   |
|        |           |            | [place] [address]             |                   |
|        |           |            | [phone]         |                   |
 
|        |           |            | [phone] (Fax)   |                   |
+--------+-----------+------------+----------------------+-------------------+
| 02/10/ | Clinical  | Cardiology |                      |                   |
|    | Support   |            |                      |                   |
+--------+-----------+------------+----------------------+-------------------+
| / | Office    | Cardiology |   Tonia Wilson,    |                   |
|    | Visit     |            | [facility]  [address]   |                   |
|        |           |            | [place]  |                   |
|        |           |            | [address]  [phone]  |                   |
|        |           |            |  [phone] (Fax)  |                   |
+--------+-----------+------------+----------------------+-------------------+
| / | Off-Site  | Nephrology |   Marzena Moser  |                   |
|    | Visit     |            | DO ETIENNE  [address] [place] [address]      |                   |
|        |           |            | [address]      |                   |
|        |           |            | [place]      |                   |
|        |           |            | [address]  [phone]  |                   |
|        |           |            |  [phone] (Fax)  |                   |
+--------+-----------+------------+----------------------+-------------------+
 documented as of this encounter
 
 Procedures
 
 
+----------------------+--------+------------+----------------------+----------------------+
| Procedure Name       | Priori | Date/Time  | Associated Diagnosis | Comments             |
|                      | ty     |            |                      |                      |
+----------------------+--------+------------+----------------------+----------------------+
| EXTERNAL LAB:        | Routin | 10/16/2015 |                      |   Results for this   |
| URINALYSIS           | e      |            |                      | procedure are in the |
|                      |        |            |                      |  results section.    |
+----------------------+--------+------------+----------------------+----------------------+
| URINALYSIS W/CULTURE | Routin | 10/16/2015 |                      |   Results for this   |
|  IF INDICATED        | e      |            |                      | procedure are in the |
|                      |        |            |                      |  results section.    |
+----------------------+--------+------------+----------------------+----------------------+
 documented in this encounter
 
 Results
 Urinalysis w/Culture if Indicated (10/16/2015)
 
+-----------------+
| Specimen        |
+-----------------+
| Urine specimen  |
| (specimen)      |
+-----------------+
 
 
 
+--------------------------------------------------------------+----------------+
| Narrative                                                    | Performed At   |
+--------------------------------------------------------------+----------------+
|   Over 100,000 CFU/ML   Klebsiella pneumoniae ssp pneumoniae |   EXTERNAL LAB |
+--------------------------------------------------------------+----------------+
 
 
 
+-----------------+------------------+--------+----------------+
| Organism        | Antibiotic       | Method | Susceptibility |
+-----------------+------------------+--------+----------------+
 
| Klebsiella      | Amoxicillin +    |        |   Sensitive    |
| pneumoniae ssp  | Clavulanate      |        |                |
| pneumoniae      |                  |        |                |
+-----------------+------------------+--------+----------------+
| Klebsiella      | Aztreonam        |        |   Sensitive    |
| pneumoniae ssp  |                  |        |                |
| pneumoniae      |                  |        |                |
+-----------------+------------------+--------+----------------+
| Klebsiella      | Ciprofloxacin    |        |   Sensitive    |
| pneumoniae ssp  |                  |        |                |
| pneumoniae      |                  |        |                |
+-----------------+------------------+--------+----------------+
| Klebsiella      | Ceftriaxone      |        |   Sensitive    |
| pneumoniae ssp  |                  |        |                |
| pneumoniae      |                  |        |                |
+-----------------+------------------+--------+----------------+
| Klebsiella      | Ertapenem        |        |   Sensitive    |
| pneumoniae ssp  |                  |        |                |
| pneumoniae      |                  |        |                |
+-----------------+------------------+--------+----------------+
| Klebsiella      | Cefepime         |        |   Sensitive    |
| pneumoniae ssp  |                  |        |                |
| pneumoniae      |                  |        |                |
+-----------------+------------------+--------+----------------+
| Klebsiella      | Gentamicin       |        |   Sensitive    |
| pneumoniae ssp  |                  |        |                |
| pneumoniae      |                  |        |                |
+-----------------+------------------+--------+----------------+
| Klebsiella      | Imipenem         |        |   Sensitive    |
| pneumoniae ssp  |                  |        |                |
| pneumoniae      |                  |        |                |
+-----------------+------------------+--------+----------------+
| Klebsiella      | Levofloxacin     |        |   Sensitive    |
| pneumoniae ssp  |                  |        |                |
| pneumoniae      |                  |        |                |
+-----------------+------------------+--------+----------------+
| Klebsiella      | Meropenem        |        |   Sensitive    |
| pneumoniae ssp  |                  |        |                |
| pneumoniae      |                  |        |                |
+-----------------+------------------+--------+----------------+
| Klebsiella      | Trimethoprim +   |        |   Sensitive    |
| pneumoniae ssp  | Sulfamethoxazole |        |                |
| pneumoniae      |                  |        |                |
+-----------------+------------------+--------+----------------+
| Klebsiella      | Tetracycline     |        |   Sensitive    |
| pneumoniae ssp  |                  |        |                |
| pneumoniae      |                  |        |                |
+-----------------+------------------+--------+----------------+
| Klebsiella      | Piperacillin +   |        |   Sensitive    |
| pneumoniae ssp  | Tazobactam       |        |                |
| pneumoniae      |                  |        |                |
+-----------------+------------------+--------+----------------+
| Klebsiella      | Nitrofurantoin   |        |   Intermediate |
| pneumoniae ssp  |                  |        |                |
| pneumoniae      |                  |        |                |
+-----------------+------------------+--------+----------------+
| Klebsiella      | Ampicillin       |        |   Resistant    |
| pneumoniae ssp  |                  |        |                |
| pneumoniae      |                  |        |                |
+-----------------+------------------+--------+----------------+
 
 
 
 
+----------------+---------+--------------------+--------------+
| Performing     | Address | City/State/Zipcode | Phone Number |
| Organization   |         |                    |              |
+----------------+---------+--------------------+--------------+
|   EXTERNAL LAB |         |                    |              |
+----------------+---------+--------------------+--------------+
 External Lab: Urinalysis (10/16/2015)
 
+-------------+----------+-----------+------------+--------------+
| Component   | Value    | Ref Range | Performed  | Pathologist  |
|             |          |           | At         | Signature    |
+-------------+----------+-----------+------------+--------------+
| UA Blood,   | Negative |           | EXTERNAL   |              |
| External    |          |           | LAB        |              |
+-------------+----------+-----------+------------+--------------+
| UA Glucose, | Normal   |           | EXTERNAL   |              |
|  External   |          |           | LAB        |              |
+-------------+----------+-----------+------------+--------------+
| UA Ketones, | Negative |           | EXTERNAL   |              |
|  External   |          |           | LAB        |              |
+-------------+----------+-----------+------------+--------------+
| UA Ph,      | 7        |           | EXTERNAL   |              |
| External    |          |           | LAB        |              |
+-------------+----------+-----------+------------+--------------+
| UA          | 75       |           | EXTERNAL   |              |
| Proteins,   |          |           | LAB        |              |
| External    |          |           |            |              |
+-------------+----------+-----------+------------+--------------+
| UA RBC,     | 0        |           | EXTERNAL   |              |
| External    |          |           | LAB        |              |
+-------------+----------+-----------+------------+--------------+
| UA Specific | 1.014    |           | EXTERNAL   |              |
|  Gravity,   |          |           | LAB        |              |
| External    |          |           |            |              |
+-------------+----------+-----------+------------+--------------+
| UA          | 500      |           | EXTERNAL   |              |
| Leukocyte   |          |           | LAB        |              |
| Esterase,   |          |           |            |              |
| External    |          |           |            |              |
+-------------+----------+-----------+------------+--------------+
 
 
 
+--------------------------+
| Resulting Agency Comment |
+--------------------------+
|   [facility]              |
+--------------------------+
 
 
 
+----------------+---------+--------------------+--------------+
| Performing     | Address | City/State/Zipcode | Phone Number |
| Organization   |         |                    |              |
+----------------+---------+--------------------+--------------+
|   EXTERNAL LAB |         |                    |              |
+----------------+---------+--------------------+--------------+
 
 documented in this encounter
 
 Visit Diagnoses
 Not on filedocumented in this encounter

## 2020-01-08 NOTE — XMS
Encounter Summary
  Created on: 2020
 
 Viviana Ricci
 External Reference #: [id number]
 : 47
 Sex: Female
 
 Demographics
 
 
+-----------------------+----------------------+
| Address               | [address]      |
|                       | [place]  [address] |
+-----------------------+----------------------+
| Home Phone            | [phone]      |
+-----------------------+----------------------+
| Preferred Language    | Unknown              |
+-----------------------+----------------------+
| Marital Status        | Single               |
+-----------------------+----------------------+
| Religion Affiliation | 1009                 |
+-----------------------+----------------------+
| Race                  | Unknown              |
+-----------------------+----------------------+
| Ethnic Group          | Unknown              |
+-----------------------+----------------------+
 
 
 Author
 
 
+--------------+--------------------------------------------+
| Author       | [facility]  |
|              | and [place]                                |
+--------------+--------------------------------------------+
| Organization | [facility]  |
|              | and [place]                                |
+--------------+--------------------------------------------+
| Address      | Unknown                                    |
+--------------+--------------------------------------------+
| Phone        | Unavailable                                |
+--------------+--------------------------------------------+
 
 
 
 Support
 
 
+----------------+--------------+---------------------+-----------------+
| Name           | Relationship | Address             | Phone           |
+----------------+--------------+---------------------+-----------------+
| Ada/Ed Radhames | ECON         | [place]              | [phone] |
|                |              | [place]  |                 |
|                |              |  [address]              |                 |
+----------------+--------------+---------------------+-----------------+
 
 
 
 
 Care Team Providers
 
 
+-----------------------+------+-------------+
| Care Team Member Name | Role | Phone       |
+-----------------------+------+-------------+
 PCP  | Unavailable |
+-----------------------+------+-------------+
 
 
 
 Reason for Visit
 
 
+-------------------+----------+
| Reason            | Comments |
+-------------------+----------+
| Medication Refill |          |
+-------------------+----------+
 
 
 
 Encounter Details
 
 
+--------+--------+---------------------+----------------------+-------------------+
| Date   | Type   | Department          | Care Team            | Description       |
+--------+--------+---------------------+----------------------+-------------------+
| 02/10/ | Refill |   [facility]         |   Marzena Moser  | Medication Refill |
|    |        | NEPHROLOGY  [address]   | M, DO  [address]      |                   |
|        |        | [address]   | [address]      |                   |
|        |        | [place], [place]     | [place], [place]      |                   |
|        |        | [address]          | [address]  [phone]  |                   |
|        |        | [phone]        |  [phone] (Fax)  |                   |
+--------+--------+---------------------+----------------------+-------------------+
 
 
 
 Social History
 
 
+--------------+-------+-----------+--------+------+
| Tobacco Use  | Types | Packs/Day | Years  | Date |
|              |       |           | Used   |      |
+--------------+-------+-----------+--------+------+
| Never Smoker |       |           |        |      |
+--------------+-------+-----------+--------+------+
 
 
 
+---------------------+---+---+---+
| Smokeless Tobacco:  |   |   |   |
| Never Used          |   |   |   |
+---------------------+---+---+---+
 
 
 
+---------------------------------------------------------------+
| Comments: some second hand smoke exposure, but fairly minimal |
 
+---------------------------------------------------------------+
 
 
 
+-------------+-------------+---------+----------+
| Alcohol Use | Drinks/Week | oz/Week | Comments |
+-------------+-------------+---------+----------+
| No          |             |         |          |
+-------------+-------------+---------+----------+
 
 
 
+------------------+---------------+
| Sex Assigned at  | Date Recorded |
| Birth            |               |
+------------------+---------------+
| Not on file      |               |
+------------------+---------------+
 
 
 
+----------------+-------------+-------------+
| Job Start Date | Occupation  | Industry    |
+----------------+-------------+-------------+
| Not on file    | Not on file | Not on file |
+----------------+-------------+-------------+
 
 
 
+----------------+--------------+------------+
| Travel History | Travel Start | Travel End |
+----------------+--------------+------------+
 
 
 
+-------------------------------------+
| No recent travel history available. |
+-------------------------------------+
 documented as of this encounter
 
 Plan of Treatment
 
 
+--------+-----------+------------+----------------------+-------------------+
| Date   | Type      | Specialty  | Care Team            | Description       |
+--------+-----------+------------+----------------------+-------------------+
| / | Office    | Cardiology |   HellalfredoTonia,    |                   |
|    | Visit     |            | ARNP  [address]   |                   |
|        |           |            | St  [place], [place]  |                   |
|        |           |            | [address]  [phone]  |                   |
|        |           |            |  [phone] (Fax)  |                   |
+--------+-----------+------------+----------------------+-------------------+
| / | Hospital  | Radiology  |   Popeye Townsend, |                   |
|    | Encounter |            |  MD  [address] |                   |
|        |           |            |  St.  [place],   |                   |
|        |           |            | [place] [address]             |                   |
|        |           |            | [phone]         |                   |
|        |           |            | [phone] (Fax)   |                   |
+--------+-----------+------------+----------------------+-------------------+
| / | Surgery   | Radiology  |   Popeye Townsend, | CV EP PPM SYSTEM  |
 
|    |           |            |  MD  [address] | IMPLANT           |
|        |           |            |  St.  [place],   |                   |
|        |           |            | [place] [address]             |                   |
|        |           |            | [phone]         |                   |
|        |           |            | [phone] (Fax)   |                   |
+--------+-----------+------------+----------------------+-------------------+
| 02/10/ | Clinical  | Cardiology |                      |                   |
|    | Support   |            |                      |                   |
+--------+-----------+------------+----------------------+-------------------+
| / | Office    | Cardiology |   Tonia Wilson,    |                   |
|    | Visit     |            | ARNP  [address]   |                   |
|        |           |            | [place]  |                   |
|        |           |            | [address]  [phone]  |                   |
|        |           |            |  [phone] (Fax)  |                   |
+--------+-----------+------------+----------------------+-------------------+
| / | Off-Site  | Nephrology |   Marzena Moser  |                   |
|    | Visit     |            | DO ETIENNE  [address] [place] [address]      |                   |
|        |           |            | [address]      |                   |
|        |           |            | [place]      |                   |
|        |           |            | [address]  [phone]  |                   |
|        |           |            |  [phone] (Fax)  |                   |
+--------+-----------+------------+----------------------+-------------------+
 documented as of this encounter
 
 Visit Diagnoses
 Not on filedocumented in this encounter

## 2020-01-08 NOTE — XMS
Encounter Summary
  Created on: 2020
 
 Viviana Ricci
 External Reference #: [id number]
 : 47
 Sex: Female
 
 Demographics
 
 
+-----------------------+----------------------+
| Address               | [address]      |
|                       | [place]  [address] |
+-----------------------+----------------------+
| Home Phone            | [phone]      |
+-----------------------+----------------------+
| Preferred Language    | Unknown              |
+-----------------------+----------------------+
| Marital Status        | Single               |
+-----------------------+----------------------+
| Denominational Affiliation | [id number]                 |
+-----------------------+----------------------+
| Race                  | Unknown              |
+-----------------------+----------------------+
| Ethnic Group          | Unknown              |
+-----------------------+----------------------+
 
 
 Author
 
 
+--------------+--------------------------------------------+
| Author       | [facility]  |
|              | and [place]                                |
+--------------+--------------------------------------------+
| Organization | [facility]  |
|              | and [place]                                |
+--------------+--------------------------------------------+
| Address      | Unknown                                    |
+--------------+--------------------------------------------+
| Phone        | Unavailable                                |
+--------------+--------------------------------------------+
 
 
 
 Support
 
 
+----------------+--------------+---------------------+-----------------+
| Name           | Relationship | Address             | Phone           |
+----------------+--------------+---------------------+-----------------+
| Ada/Ed Radhames | ECON         | [place]              | [phone] |
|                |              | [place]  |                 |
|                |              |  [address]              |                 |
+----------------+--------------+---------------------+-----------------+
 
 
 
 
 Care Team Providers
 
 
+-----------------------+------+-------------+
| Care Team Member Name | Role | Phone       |
+-----------------------+------+-------------+
 PCP  | Unavailable |
+-----------------------+------+-------------+
 
 
 
 Reason for Visit
 
 
+-------------------+----------+
| Reason            | Comments |
+-------------------+----------+
| Medication Refill |          |
+-------------------+----------+
 
 
 
 Encounter Details
 
 
+--------+--------+---------------------+----------------------+-------------------+
| Date   | Type   | Department          | Care Team            | Description       |
+--------+--------+---------------------+----------------------+-------------------+
| 10/29/ | Refill |   [facility] [place]         |   Marzena Moser  | Medication Refill |
|    |        | NEPHROLOGY  [address]   | M, DO  [address]      |                   |
|        |        | [address]   | [address]      |                   |
|        |        | [place], [place]     | [place], [place]      |                   |
|        |        | [address]          | [address]  [phone]  |                   |
|        |        | [phone]        |  [phone] (Fax)  |                   |
+--------+--------+---------------------+----------------------+-------------------+
 
 
 
 Social History
 
 
+----------------+-------+-----------+--------+------+
| Tobacco Use    | Types | Packs/Day | Years  | Date |
|                |       |           | Used   |      |
+----------------+-------+-----------+--------+------+
| Never Assessed |       |           |        |      |
+----------------+-------+-----------+--------+------+
 
 
 
+------------------+---------------+
| Sex Assigned at  | Date Recorded |
| Birth            |               |
+------------------+---------------+
| Not on file      |               |
+------------------+---------------+
 
 
 
 
+----------------+-------------+-------------+
| Job Start Date | Occupation  | Industry    |
+----------------+-------------+-------------+
| Not on file    | Not on file | Not on file |
+----------------+-------------+-------------+
 
 
 
+----------------+--------------+------------+
| Travel History | Travel Start | Travel End |
+----------------+--------------+------------+
 
 
 
+-------------------------------------+
| No recent travel history available. |
+-------------------------------------+
 documented as of this encounter
 
 Plan of Treatment
 
 
+--------+-----------+------------+----------------------+-------------------+
| Date   | Type      | Specialty  | Care Team            | Description       |
+--------+-----------+------------+----------------------+-------------------+
| / | Office    | Cardiology |   Tonia Wilson,    |                   |
|    | Visit     |            | ARNJESSICA  [address]   |                   |
|        |           |            | St  [place], [place]  |                   |
|        |           |            | [address]  [phone]  |                   |
|        |           |            |  [phone] (Fax)  |                   |
+--------+-----------+------------+----------------------+-------------------+
| / | Hospital  | Radiology  |   Popeye Townsend, |                   |
|    | Encounter |            |  MD  [address] |                   |
|        |           |            |  St.  [place],   |                   |
|        |           |            | [place] [address]             |                   |
|        |           |            | [phone]         |                   |
|        |           |            | [phone] (Fax)   |                   |
+--------+-----------+------------+----------------------+-------------------+
| / | Surgery   | Radiology  |   Popeye Townsend, | CV EP PPM SYSTEM  |
2020   |           |            |  MD  [address] | IMPLANT           |
|        |           |            |  St.  [place],   |                   |
|        |           |            | [place] [address]             |                   |
|        |           |            | [phone]         |                   |
|        |           |            | [phone] (Fax)   |                   |
+--------+-----------+------------+----------------------+-------------------+
| 02/10/ | Clinical  | Cardiology |                      |                   |
|    | Support   |            |                      |                   |
+--------+-----------+------------+----------------------+-------------------+
| / | Office    | Cardiology |   Tonia Wilson,    |                   |
|    | Visit     |            | [place] [address] [address]   |                   |
|        |           |            | [place]  |                   |
|        |           |            | [address]  [phone]  |                   |
|        |           |            |  [phone] (Fax)  |                   |
+--------+-----------+------------+----------------------+-------------------+
| / | Off-Site  | Nephrology |   Marzena Moser  |                   |
|    | Visit     |            | DO ETIENNE  [address] [place] [address]      |                   |
|        |           |            | [address]      |                   |
|        |           |            | [place]      |                   |
|        |           |            | [address]  [phone]  |                   |
|        |           |            |  [phone] (Fax)  |                   |
 
+--------+-----------+------------+----------------------+-------------------+
 documented as of this encounter
 
 Visit Diagnoses
 Not on filedocumented in this encounter

## 2020-01-08 NOTE — XMS
Encounter Summary
  Created on: 2020
 
 Viviana Ricci
 External Reference #: [id number]
 : 47
 Sex: Female
 
 Demographics
 
 
+-----------------------+----------------------+
| Address               | [address]      |
|                       | [place]  [address] |
+-----------------------+----------------------+
| Home Phone            | [phone]      |
+-----------------------+----------------------+
| Preferred Language    | Unknown              |
+-----------------------+----------------------+
| Marital Status        | Single               |
+-----------------------+----------------------+
| Sikh Affiliation | [id number]                 |
+-----------------------+----------------------+
| Race                  | Unknown              |
+-----------------------+----------------------+
| Ethnic Group          | Unknown              |
+-----------------------+----------------------+
 
 
 Author
 
 
+--------------+--------------------------------------------+
| Author       | Merged with [facility]  |
|              | and [facility]                                |
+--------------+--------------------------------------------+
| Organization | Merged with [facility]  |
|              | and [facility]                                |
+--------------+--------------------------------------------+
| Address      | Unknown                                    |
+--------------+--------------------------------------------+
| Phone        | Unavailable                                |
+--------------+--------------------------------------------+
 
 
 
 Support
 
 
+----------------+--------------+---------------------+-----------------+
| Name           | Relationship | Address             | Phone           |
+----------------+--------------+---------------------+-----------------+
| Ada/Ed Radhames | ECON         | [address]              | [phone] |
|                |              | [place]  |                 |
|                |              |  [address]              |                 |
+----------------+--------------+---------------------+-----------------+
 
 
 
 
 Care Team Providers
 
 
+-----------------------+------+-------------+
| Care Team Member Name | Role | Phone       |
+-----------------------+------+-------------+
 PCP  | Unavailable |
+-----------------------+------+-------------+
 
 
 
 Encounter Details
 
 
+--------+----------+---------------------+----------------------+-------------+
| Date   | Type     | Department          | Care Team            | Description |
+--------+----------+---------------------+----------------------+-------------+
| / | Abstract |   [facility]         |   Marzena Moser  |             |
|    |          | NEPHROLOGY  [address]   | M, DO  [address]      |             |
|        |          | [address]   | [address]      |             |
|        |          | [place], [place]     | [place]      |             |
|        |          | [address]          | [address]  [phone]  |             |
|        |          | [phone]        |  [phone] (Fax)  |             |
+--------+----------+---------------------+----------------------+-------------+
 
 
 
 Social History
 
 
+--------------+-------+-----------+--------+------+
| Tobacco Use  | Types | Packs/Day | Years  | Date |
|              |       |           | Used   |      |
+--------------+-------+-----------+--------+------+
| Never Smoker |       |           |        |      |
+--------------+-------+-----------+--------+------+
 
 
 
+---------------------+---+---+---+
| Smokeless Tobacco:  |   |   |   |
| Never Used          |   |   |   |
+---------------------+---+---+---+
 
 
 
+---------------------------------------------------------------+
| Comments: some second hand smoke exposure, but fairly minimal |
+---------------------------------------------------------------+
 
 
 
+-------------+-------------+---------+----------+
| Alcohol Use | Drinks/Week | oz/Week | Comments |
+-------------+-------------+---------+----------+
| No          |             |         |          |
+-------------+-------------+---------+----------+
 
 
 
 
+------------------+---------------+
| Sex Assigned at  | Date Recorded |
| Birth            |               |
+------------------+---------------+
| Not on file      |               |
+------------------+---------------+
 
 
 
+----------------+-------------+-------------+
| Job Start Date | Occupation  | Industry    |
+----------------+-------------+-------------+
| Not on file    | Not on file | Not on file |
+----------------+-------------+-------------+
 
 
 
+----------------+--------------+------------+
| Travel History | Travel Start | Travel End |
+----------------+--------------+------------+
 
 
 
+-------------------------------------+
| No recent travel history available. |
+-------------------------------------+
 documented as of this encounter
 
 Plan of Treatment
 
 
+--------+-----------+------------+----------------------+-------------------+
| Date   | Type      | Specialty  | Care Team            | Description       |
+--------+-----------+------------+----------------------+-------------------+
| / | Office    | Cardiology |   Tonia Wilson,    |                   |
|    | Visit     |            | ARNJESSICA  [address]   |                   |
|        |           |            | [place]  |                   |
|        |           |            | [address]  [phone]  |                   |
|        |           |            |  [phone] (Fax)  |                   |
+--------+-----------+------------+----------------------+-------------------+
| / | Hospital  | Radiology  |   Popeye Townsend, |                   |
|    | Encounter |            |  MD  [address] |                   |
|        |           |            |  St.  [place],   |                   |
|        |           |            | [place] [address]             |                   |
|        |           |            | [phone]         |                   |
|        |           |            | [phone] (Fax)   |                   |
+--------+-----------+------------+----------------------+-------------------+
| / | Surgery   | Radiology  |   Popeye Townsend, | CV EP PPM SYSTEM  |
|    |           |            |  MD  [address] | IMPLANT           |
|        |           |            |  St.  [place],   |                   |
|        |           |            | [place] [address]             |                   |
|        |           |            | [phone]         |                   |
|        |           |            | [phone] (Fax)   |                   |
+--------+-----------+------------+----------------------+-------------------+
| 02/10/ | Clinical  | Cardiology |                      |                   |
|    | Support   |            |                      |                   |
+--------+-----------+------------+----------------------+-------------------+
| / | Office    | Cardiology |   Tonia Wilson,    |                   |
|    | Visit     |            | ARNP  [address]   |                   |
 
|        |           |            | St  [place], [place]  |                   |
|        |           |            | [address]  [phone]  |                   |
|        |           |            |  [phone] (Fax)  |                   |
+--------+-----------+------------+----------------------+-------------------+
| / | Off-Site  | Nephrology |   Marzena Moser  |                   |
|    | Visit     |            | DO ETIENNE  [address] [place] [address]      |                   |
|        |           |            | [address]      |                   |
|        |           |            | [place]      |                   |
|        |           |            | [address]  [phone]  |                   |
|        |           |            |  [phone] (Fax)  |                   |
+--------+-----------+------------+----------------------+-------------------+
 documented as of this encounter
 
 Procedures
 
 
+---------------------+--------+------------+----------------------+----------------------+
| Procedure Name      | Priori | Date/Time  | Associated Diagnosis | Comments             |
|                     | ty     |            |                      |                      |
+---------------------+--------+------------+----------------------+----------------------+
| CULTURE, ORGANISM   | Routin | 2015 |                      |   Results for this   |
| ID, URINE (NON-ORD) | e      |            |                      | procedure are in the |
|                     |        |            |                      |  results section.    |
+---------------------+--------+------------+----------------------+----------------------+
 documented in this encounter
 
 Results
 Culture, Organism ID, Urine (2015)
 
+-----------+--------------------------+-----------+------------+--------------+
| Component | Value                    | Ref Range | Performed  | Pathologist  |
|           |                          |           | At         | Signature    |
+-----------+--------------------------+-----------+------------+--------------+
| Urine     | 100,000Comment: Lactose  | CFU/ML    |            |              |
| Culture,  |                 |           |            |              |
| Routine   |                          |           |            |              |
+-----------+--------------------------+-----------+------------+--------------+
 
 
 
+----------+
| Specimen |
+----------+
|          |
+----------+
 
 
 
+---------------------+----------------+--------+----------------+
| Organism            | Antibiotic     | Method | Susceptibility |
+---------------------+----------------+--------+----------------+
| Lactose Fermenting  | Ampicillin     |        |   Sensitive    |
| Gram Negative       |                |        |                |
| Bacilli             |                |        |                |
+---------------------+----------------+--------+----------------+
| Lactose Fermenting  | Amoxicillin +  |        |   Sensitive    |
| Gram Negative       | Clavulanate    |        |                |
| Bacilli             |                |        |                |
+---------------------+----------------+--------+----------------+
| Lactose Fermenting  | Aztreonam      |        |   Sensitive    |
 
| Gram Negative       |                |        |                |
| Bacilli             |                |        |                |
+---------------------+----------------+--------+----------------+
| Lactose Fermenting  | Ciprofloxacin  |        |   Sensitive    |
| Gram Negative       |                |        |                |
| Bacilli             |                |        |                |
+---------------------+----------------+--------+----------------+
| Lactose Fermenting  | Ceftriaxone    |        |   Sensitive    |
| Gram Negative       |                |        |                |
| Bacilli             |                |        |                |
+---------------------+----------------+--------+----------------+
| Lactose Fermenting  | Cefazolin      |        |   Sensitive    |
| Gram Negative       |                |        |                |
| Bacilli             |                |        |                |
+---------------------+----------------+--------+----------------+
| Lactose Fermenting  | Ertapenem      |        |   Sensitive    |
| Gram Negative       |                |        |                |
| Bacilli             |                |        |                |
+---------------------+----------------+--------+----------------+
| Lactose Fermenting  | Nitrofurantoin |        |   Sensitive    |
| Gram Negative       |                |        |                |
| Bacilli             |                |        |                |
+---------------------+----------------+--------+----------------+
| Lactose Fermenting  | Gentamicin     |        |   Sensitive    |
| Gram Negative       |                |        |                |
| Bacilli             |                |        |                |
+---------------------+----------------+--------+----------------+
| Lactose Fermenting  | Levofloxacin   |        |   Sensitive    |
| Gram Negative       |                |        |                |
| Bacilli             |                |        |                |
+---------------------+----------------+--------+----------------+
| Lactose Fermenting  | Tetracycline   |        |   Sensitive    |
| Gram Negative       |                |        |                |
| Bacilli             |                |        |                |
+---------------------+----------------+--------+----------------+
 documented in this encounter
 
 Visit Diagnoses
 Not on filedocumented in this encounter

## 2020-01-08 NOTE — XMS
Encounter Summary
  Created on: 2020
 
 Viviana Ricci
 External Reference #: [id number]
 : 47
 Sex: Female
 
 Demographics
 
 
+-----------------------+----------------------+
| Address               | [address]      |
|                       | [place]  [address] |
+-----------------------+----------------------+
| Home Phone            | [phone]      |
+-----------------------+----------------------+
| Preferred Language    | Unknown              |
+-----------------------+----------------------+
| Marital Status        | Single               |
+-----------------------+----------------------+
| Rastafarian Affiliation | [id number]                 |
+-----------------------+----------------------+
| Race                  | Unknown              |
+-----------------------+----------------------+
| Ethnic Group          | Unknown              |
+-----------------------+----------------------+
 
 
 Author
 
 
+--------------+--------------------------------------------+
| Author       | [facility]  |
|              | and [facility]                                |
+--------------+--------------------------------------------+
| Organization | [facility]  |
|              | and [facility]                                |
+--------------+--------------------------------------------+
| Address      | Unknown                                    |
+--------------+--------------------------------------------+
| Phone        | Unavailable                                |
+--------------+--------------------------------------------+
 
 
 
 Support
 
 
+----------------+--------------+---------------------+-----------------+
| Name           | Relationship | Address             | Phone           |
+----------------+--------------+---------------------+-----------------+
| Ada/Ed Radhames | ECON         | [address]              | [phone] |
|                |              | [place]  |                 |
|                |              |  [address]              |                 |
+----------------+--------------+---------------------+-----------------+
 
 
 
 
 Care Team Providers
 
 
+-----------------------+------+-------------+
| Care Team Member Name | Role | Phone       |
+-----------------------+------+-------------+
 PCP  | Unavailable |
+-----------------------+------+-------------+
 
 
 
 Encounter Details
 
 
+--------+-----------+---------------------+----------------------+----------------------+
| Date   | Type      | Department          | Care Team            | Description          |
+--------+-----------+---------------------+----------------------+----------------------+
| 07/15/ | Off-Site  |   [facility]         |   Marzena Moser  | Complications of     |
|    | Visit     | NEPHROLOGY  [address]   | M, DO  [address]      | transplanted kidney  |
|        |           | [address]   | [address]      | (Primary Dx);        |
|        |           | [place], [place]     | [place], [place]      | Unspecified          |
|        |           | [address]          | [address]  [phone]  | hypertensive kidney  |
|        |           | [phone]        |  [phone] (Fax)  | disease with chronic |
|        |           |                     |                      |  kidney disease      |
|        |           |                     |                      | stage I through      |
|        |           |                     |                      | stage IV, or         |
|        |           |                     |                      | unspecified; FSGS    |
|        |           |                     |                      | (focal segmental     |
|        |           |                     |                      | glomerulosclerosis); |
|        |           |                     |                      |  Diabetes mellitus,  |
|        |           |                     |                      | type 2 (HCC)         |
+--------+-----------+---------------------+----------------------+----------------------+
 
 
 
 Social History
 
 
+--------------+-------+-----------+--------+------+
| Tobacco Use  | Types | Packs/Day | Years  | Date |
|              |       |           | Used   |      |
+--------------+-------+-----------+--------+------+
| Never Smoker |       |           |        |      |
+--------------+-------+-----------+--------+------+
 
 
 
+---------------------+---+---+---+
| Smokeless Tobacco:  |   |   |   |
| Never Used          |   |   |   |
+---------------------+---+---+---+
 
 
 
+---------------------------------------------------------------+
| Comments: some second hand smoke exposure, but fairly minimal |
+---------------------------------------------------------------+
 
 
 
 
+-------------+-------------+---------+----------+
| Alcohol Use | Drinks/Week | oz/Week | Comments |
+-------------+-------------+---------+----------+
| No          |             |         |          |
+-------------+-------------+---------+----------+
 
 
 
+------------------+---------------+
| Sex Assigned at  | Date Recorded |
| Birth            |               |
+------------------+---------------+
| Not on file      |               |
+------------------+---------------+
 
 
 
+----------------+-------------+-------------+
| Job Start Date | Occupation  | Industry    |
+----------------+-------------+-------------+
| Not on file    | Not on file | Not on file |
+----------------+-------------+-------------+
 
 
 
+----------------+--------------+------------+
| Travel History | Travel Start | Travel End |
+----------------+--------------+------------+
 
 
 
+-------------------------------------+
| No recent travel history available. |
+-------------------------------------+
 documented as of this encounter
 
 Last Filed Vital Signs
 
 
+-------------------+----------------------+----------------------+----------+
| Vital Sign        | Reading              | Time Taken           | Comments |
+-------------------+----------------------+----------------------+----------+
| Blood Pressure    | 136/78               | 07/15/2013  2:21 PM  |          |
|                   |                      | PDT                  |          |
+-------------------+----------------------+----------------------+----------+
| Pulse             | -                    | -                    |          |
+-------------------+----------------------+----------------------+----------+
| Temperature       | 36.7   C (98.1   F)  | 07/15/2013  2:21 PM  |          |
|                   |                      | PDT                  |          |
+-------------------+----------------------+----------------------+----------+
| Respiratory Rate  | -                    | -                    |          |
+-------------------+----------------------+----------------------+----------+
| Oxygen Saturation | -                    | -                    |          |
+-------------------+----------------------+----------------------+----------+
| Inhaled Oxygen    | -                    | -                    |          |
| Concentration     |                      |                      |          |
+-------------------+----------------------+----------------------+----------+
| Weight            | 136 kg (299 lb 13.2  | 07/15/2013  2:21 PM  |          |
|                   | oz)                  | PDT                  |          |
 
+-------------------+----------------------+----------------------+----------+
| Height            | -                    | -                    |          |
+-------------------+----------------------+----------------------+----------+
| Body Mass Index   | 49.89                | 2013 10:22 AM  |          |
|                   |                      | PDT                  |          |
+-------------------+----------------------+----------------------+----------+
 documented in this encounter
 
 Progress Marzena Tamayo DO - 07/15/2013  5:59 PM PDTFormatting of this note might be different
 from the original.
 Subjective:  NEPHROLOGY 
  
 Patient ID: Viviana Ricci is a 67 y.o. female.
 
 HPI Comments: 
 Followup for this 67 year old white female s/p renal allograft, 05, with previous allo
graft dysfunction secondary to FSGS, also with Type II DM, hypertension, hypothyroidism, hyp
erlipidemia, 
 SHPTH,  previous low back pain, obesity/JACK, chronic pulmonary  hypertension, possibly rela
jeanie to obesity?, and  CAD, s/p CABG x3 vessels,.   She had a pulmonary evaluation after 
her asthma flair, and her PFT's were found to be normal.  She denies dyspnea, cough, increas
ed edema, or fever. She had cystitis last week treated with cephalexin.  She is feeling bett
er.
 
 MEDS:
 
 Prograf 1.5 mg, BID
 Mycophenolate 250 mg, TID.
 Prednisone 5 mg, daily.
 albuterol (PROAIR HFA) 90 mcg/puff inhaler, Inhale 2 puffs into the lungs every 6 hours as 
needed for Wheezing., Disp: 1 Inhaler, Rfl: 2
 allopurinol (ZYLOPRIM) 100 mg tablet, Take 100 mg by mouth Daily., Disp: , Rfl: 
 aspirin 81 MG EC tablet, Take 81 mg by mouth Daily., Disp: , Rfl: 
 Cholecalciferol (VITAMIN D3) 5000 UNITS CAPS, Take 5,000 Units by mouth Once a week., Disp:
 , 
 cinacalcet (SENSIPAR) 30 mg tablet, Take 1 tablet by mouth Daily., Disp: 30 tablet, Rfl: 12
 fludrocortisone (FLORINEF) 0.1 mg tablet, Take 1 tablet by mouth Every other day., Disp: 30
 tablet, Rfl: 11
 flunisolide (NASAREL) 29 MCG/ACT (0.025%) nasal spray, 2 sprays by Nasal route Daily. Dose 
is for each nostril., Disp: 25 mL, Rfl: 12
 fluticasone (FLOVENT HFA) 220 mcg/puff inhaler, Inhale 1 puff into the lungs 2 times daily.
 Rinse mouth after use., Disp: 1 Inhaler, Rfl: 11
 furosemide (LASIX) 40 mg tablet, Take two tablets by mouth daily., Disp: 60 tablet, Rfl: 5
 glucose blood VI test strips (ONE TOUCH ULTRA TEST) strip, Check blood sugar before each me
al and as directed, Disp: 100 each, Rfl: 12
 insulin glargine (LANTUS) 100 units/mL injection, Inject 20 Units under the skin every morn
ing.  , Disp: , Rfl: 
 insulin lispro (HUMALOG) 100 units/mL injection, Inject subcutaneously before meals accordi
ng to sliding scale, Disp: 10 pen, Rfl: 5
 Insulin Syringe-Needle U-100 (BD INSULIN SYRINGE ULTRAFINE) 31G X 5/16" 0.5 ML MISC, Use be
fore meals and as directed., Disp: 100 each, Rfl: 11
 levothyroxine (LEVOTHROID) 50 mcg tablet, Take 1 tablet by mouth Daily., Disp: 30 tablet, R
fl: 11
 lisinopril (PRINIVIL,ZESTRIL) 30 MG tablet, Take 1 tablet by mouth Daily., Disp: 90 tablet,
 Rfl: 3
 loperamide (ANTI-DIARRHEAL) 2 mg capsule, Take 2 mg by mouth Daily as needed., Disp: , Rfl:
 
 magnesium oxide (MAG-OX) 400 mg tablet, Take 1 tablet by mouth 2 times daily., Disp: 62 tab
let, Rfl: 12
 
 metoprolol tartrate (LOPRESSOR) 25 mg tablet, Take 1 tablet by mouth 2 times daily., Disp: 
60 tablet, Rfl: 11
 omeprazole (PRILOSEC) 20 mg capsule, Take one capsule by mouth once daily on an empty stoma
ch, Disp: , Rfl: 
 Prenatal Multivit-Min-Fe-FA (PRENATAL VITAMINS) 0.8 MG TABS, Take 0.8 mg by mouth Daily., D
isp: 30 each, Rfl: 11
 Respiratory Therapy Supplies MISC, Decrease CPAP to 12-18 cmH2O Diagnosis Code(s)327.23.
 rosuvastatin (CRESTOR) 40 MG tablet, Take 20 mg by mouth nightly., Disp: , Rfl: 
 valsartan (DIOVAN) 160 mg tablet, Take 1 tablet by mouth Daily., Disp: 30 tablet, Rfl: 11
 
  
  Review of Systems
 
 No Known Allergies
 
 Objective:   Blood pressure 136/78, temperature 36.7 C (98.1 F), weight 136 kg (299 lb 
13.2 oz).
 
  
 Physical Exam
 
 HEENT: No thrush.
 Heart:  Regular rate and rhythm with no S3, S4, murmur or rub.
 Lungs:   CTA bilaterally, no rales or wheezes.
 Abdomen:  Soft, flat,  Renal allograft in RLQ is nontender, normoactive bowel sounds.
 Extremities: No clubbing, edema, or foot ulcers.
 
 LAB:   BUN 69, Cr 1.7, K+ 4.8, HCO3 19, Glu 103, Mg++ 2.1, PO4 4.1, WBC 4.6, H/H = 11.9/35.
7, ,000, Tacrolimus = 6.3 ng/ml, PCR for BK virus, blood = not detected, PCR for BK v
irus, urine = 2.6 to 8.6 log copies/ml.  
 
 Assessment: 
 
 1.  Renal Allograft--Scr is at baseline.
 2.  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
 3.  Type 2 DM--fair control per the patient.
 4.  Hyperlipidemia-- stable. 
 5.  Hypertension--stable.
 6.  SHPTH--about same.
 7.  Obesity/JACK/Pulmonary hypertension-- stable. 
 8.  CAD, s/p CABG, --stable on ASA, metoprolol, atorvastatin.
 9.  Type IV RTA--stable.
 10. Chronic Low Back pain--in remission. 
 
 Plan: 
 
 1.  Her edema is much decreased this visit , therefore, will decrease the lasix to 40 mg, d
aily.
 2.  I encouraged Viviana to maximize her efforts at weight loss, and portion control, although
 her weight is roughly the same.
 3.  I think that some of her pulmonary hypertension could be secondary to her BMI and basel
ine work of breathing.
 4.  Will plan to see her in 6 months at the [facility], [place], [place].  She will continu
e to do her standing order every 2-3 months.
 
 CC:  Nena Dalal M.D., [facility], [facility] CenterElectronically signed by Marzena mak DO at 07/15/2013  6:26 PM PDTdocumented in this encounter
 
 Plan of Treatment
 
 
 
+--------+-----------+------------+----------------------+-------------------+
| Date   | Type      | Specialty  | Care Team            | Description       |
+--------+-----------+------------+----------------------+-------------------+
| / | Office    | Cardiology |   Tonia Wilson,    |                   |
|    | Visit     |            | [facility]  [address]   |                   |
|        |           |            | St  [place], [place]  |                   |
|        |           |            | [address]  [phone]  |                   |
|        |           |            |  [phone] (Fax)  |                   |
+--------+-----------+------------+----------------------+-------------------+
| / | Hospital  | Radiology  |   Popeye Townsend, |                   |
|    | Encounter |            |  MD  [address] |                   |
|        |           |            |  [place],   |                   |
|        |           |            | [place] [address]             |                   |
|        |           |            | [phone]         |                   |
|        |           |            | [phone] (Fax)   |                   |
+--------+-----------+------------+----------------------+-------------------+
| / | Surgery   | Radiology  |   Popeye Townsend, | CV EP PPM SYSTEM  |
|    |           |            |  MD  [address] | IMPLANT           |
|        |           |            |  [place]ie [place],   |                   |
|        |           |            | [place] [address]             |                   |
|        |           |            | [phone]         |                   |
|        |           |            | [phone] (Fax)   |                   |
+--------+-----------+------------+----------------------+-------------------+
| 02/10/ | Clinical  | Cardiology |                      |                   |
|    | Support   |            |                      |                   |
+--------+-----------+------------+----------------------+-------------------+
| / | Office    | Cardiology |   Katie Tonia,    |                   |
|    | Visit     |            | [facility]  [address]   |                   |
|        |           |            | [place]  |                   |
|        |           |            | [address]  [phone]  |                   |
|        |           |            |  [phone] (Fax)  |                   |
+--------+-----------+------------+----------------------+-------------------+
| / | Off-Site  | Nephrology |   Marzena Moser  |                   |
|    | Visit     |            | DO ETIENNE  [address] [place] [address]      |                   |
|        |           |            | [address]      |                   |
|        |           |            | [place]      |                   |
|        |           |            | [address]  [phone]  |                   |
|        |           |            |  [phone] (Fax)  |                   |
+--------+-----------+------------+----------------------+-------------------+
 documented as of this encounter
 
 Visit Diagnoses
 
 
+------------------------------------------------------------------------------------------+
| Diagnosis                                                                                |
+------------------------------------------------------------------------------------------+
|   Complications of transplanted kidney - Primary                                         |
+------------------------------------------------------------------------------------------+
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
| stage IV, or unspecified(403.90)  Unspecified hypertensive kidney disease with chronic   |
| kidney disease stage I through stage IV, or unspecified                                  |
+------------------------------------------------------------------------------------------+
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
| membranous glomerulonephritis                                                            |
+------------------------------------------------------------------------------------------+
|   Diabetes mellitus, type 2 (HCC)  Type II or unspecified type diabetes mellitus without |
|  mention of complication, not stated as uncontrolled                                     |
 
+------------------------------------------------------------------------------------------+
 documented in this encounter

## 2020-01-08 NOTE — XMS
Encounter Summary
  Created on: 2020
 
 Viviana Ricci
 External Reference #: [id number]
 : 47
 Sex: Female
 
 Demographics
 
 
+-----------------------+----------------------+
| Address               | [address]      |
|                       | [place]  [address] |
+-----------------------+----------------------+
| Home Phone            | [phone]      |
+-----------------------+----------------------+
| Preferred Language    | Unknown              |
+-----------------------+----------------------+
| Marital Status        | Single               |
+-----------------------+----------------------+
| Yarsanism Affiliation | [id number]                 |
+-----------------------+----------------------+
| Race                  | Unknown              |
+-----------------------+----------------------+
| Ethnic Group          | Unknown              |
+-----------------------+----------------------+
 
 
 Author
 
 
+--------------+--------------------------------------------+
| Author       | [facility]  |
|              | and [facility]                                |
+--------------+--------------------------------------------+
| Organization | [facility]  |
|              | and [facility]                                |
+--------------+--------------------------------------------+
| Address      | Unknown                                    |
+--------------+--------------------------------------------+
| Phone        | Unavailable                                |
+--------------+--------------------------------------------+
 
 
 
 Support
 
 
+----------------+--------------+---------------------+-----------------+
| Name           | Relationship | Address             | Phone           |
+----------------+--------------+---------------------+-----------------+
| Ada/Ed Radhames | ECON         | [address]              | [phone] |
|                |              | [place]  |                 |
|                |              |  [address]              |                 |
+----------------+--------------+---------------------+-----------------+
 
 
 
 
 Care Team Providers
 
 
+-----------------------+------+-------------+
| Care Team Member Name | Role | Phone       |
+-----------------------+------+-------------+
 PCP  | Unavailable |
+-----------------------+------+-------------+
 
 
 
 Reason for Referral
 Diagnostic/Screening (Routine)
 
+--------+--------+-----------+--------------+--------------+---------------+
| Status | Reason | Specialty | Diagnoses /  | Referred By  | Referred To   |
|        |        |           | Procedures   | Contact      | Contact       |
+--------+--------+-----------+--------------+--------------+---------------+
| Closed |        | Radiology |   Diagnoses  |   [place], |   [facility] |
|        |        |           |  Other chest |  MD [facility]  |  Medicine     |
|        |        |           |  pain  CAD   |  [address]    | [address]  |
|        |        |           | (coronary    | [address]   |  [place], |
|        |        |           | artery       | [place], |  [place]           |
|        |        |           | disease)     |  [place] [address]    | [address]    |
|        |        |           | Pre-op       | Phone:       | Phone:        |
|        |        |           | evaluation   | [phone] | [phone]  |
|        |        |           | Procedures   |   Fax:       |  Fax:         |
|        |        |           | [facility] Nuclear   | [phone] | [phone]  |
|        |        |           | Stress Test  |              |               |
|        |        |           | (Vasodilator |              |               |
|        |        |           | )            |              |               |
+--------+--------+-----------+--------------+--------------+---------------+
 
 
 
 
 Reason for Visit
 Diagnostic/Screening (Routine)
 
+--------+--------+-----------+--------------+--------------+---------------+
| Status | Reason | Specialty | Diagnoses /  | Referred By  | Referred To   |
|        |        |           | Procedures   | Contact      | Contact       |
+--------+--------+-----------+--------------+--------------+---------------+
| Closed |        | Radiology |   Diagnoses  |   [facility] |   [facility] |
|        |        |           |  Other chest |  [facility]  |  Medicine     |
|        |        |           |  pain  CAD   |  [address]    | [address]  |
|        |        |           | (coronary    | [address]   |  [place], |
|        |        |           | artery       | [place], |  [place]           |
|        |        |           | disease)     |  [place] [address]    | [address]    |
|        |        |           | Pre-op       | Phone:       | Phone:        |
|        |        |           | evaluation   | [phone] | [phone]  |
|        |        |           | Procedures   |   Fax:       |  Fax:         |
|        |        |           | NM Nuclear   | [phone] | [phone]  |
|        |        |           | Stress Test  |              |               |
|        |        |           | (Vasodilator |              |               |
|        |        |           | )            |              |               |
+--------+--------+-----------+--------------+--------------+---------------+
 
 
 
 
 
 Encounter Details
 
 
+--------+-----------+----------------------+----------------------+----------------------+
| Date   | Type      | Department           | Care Team            | Description          |
+--------+-----------+----------------------+----------------------+----------------------+
| / | Hospital  |   [facility] |   P[facility] Townsend, | Other chest pain;    |
|    | Encounter |  [facility]     |  MD  [address] | CAD (coronary artery |
|        |           | MEDICINE  [address]      |  [address]  [place],   |  disease); Pre-op    |
|        |           | [place]  [place], | [place] [address]             | evaluation           |
|        |           |  [place] [address]       | [phone]         |                      |
|        |           | [phone]         | [phone] (Fax)   |                      |
+--------+-----------+----------------------+----------------------+----------------------+
 
 
 
 Social History
 
 
+--------------+-------+-----------+--------+------+
| Tobacco Use  | Types | Packs/Day | Years  | Date |
|              |       |           | Used   |      |
+--------------+-------+-----------+--------+------+
| Never Smoker |       |           |        |      |
+--------------+-------+-----------+--------+------+
 
 
 
+---------------------+---+---+---+
| Smokeless Tobacco:  |   |   |   |
| Never Used          |   |   |   |
+---------------------+---+---+---+
 
 
 
+---------------------------------------------------------------+
| Comments: some second hand smoke exposure, but fairly minimal |
+---------------------------------------------------------------+
 
 
 
+-------------+-------------+---------+----------+
| Alcohol Use | Drinks/Week | oz/Week | Comments |
+-------------+-------------+---------+----------+
| No          |             |         |          |
+-------------+-------------+---------+----------+
 
 
 
+------------------+---------------+
| Sex Assigned at  | Date Recorded |
| Birth            |               |
+------------------+---------------+
| Not on file      |               |
+------------------+---------------+
 
 
 
 
+----------------+-------------+-------------+
| Job Start Date | Occupation  | Industry    |
+----------------+-------------+-------------+
| Not on file    | Not on file | Not on file |
+----------------+-------------+-------------+
 
 
 
+----------------+--------------+------------+
| Travel History | Travel Start | Travel End |
+----------------+--------------+------------+
 
 
 
+-------------------------------------+
| No recent travel history available. |
+-------------------------------------+
 documented as of this encounter
 
 Last Filed Vital Signs
 
 
+-------------------+-------------------+----------------------+----------+
| Vital Sign        | Reading           | Time Taken           | Comments |
+-------------------+-------------------+----------------------+----------+
| Blood Pressure    | -                 | -                    |          |
+-------------------+-------------------+----------------------+----------+
| Pulse             | -                 | -                    |          |
+-------------------+-------------------+----------------------+----------+
| Temperature       | -                 | -                    |          |
+-------------------+-------------------+----------------------+----------+
| Respiratory Rate  | -                 | -                    |          |
+-------------------+-------------------+----------------------+----------+
| Oxygen Saturation | -                 | -                    |          |
+-------------------+-------------------+----------------------+----------+
| Inhaled Oxygen    | -                 | -                    |          |
| Concentration     |                   |                      |          |
+-------------------+-------------------+----------------------+----------+
| Weight            | 125.6 kg (277 lb) | 2014  7:00 AM  |          |
|                   |                   | PDT                  |          |
+-------------------+-------------------+----------------------+----------+
| Height            | -                 | -                    |          |
+-------------------+-------------------+----------------------+----------+
| Body Mass Index   | 44.71             | 2014 12:53 PM  |          |
|                   |                   | PDT                  |          |
+-------------------+-------------------+----------------------+----------+
 documented in this encounter
 
 Medications at Time of Discharge
 
 
+----------------------+----------------------+-----------+---------+----------+-----------+
| Medication           | Sig                  | Dispensed | Refills | Start    | End Date  |
|                      |                      |           |         | Date     |           |
+----------------------+----------------------+-----------+---------+----------+-----------+
|   cholecalciferol    | Take 2,000 Units by  |           | 0       |          |           |
| (VITAMIN D-3) 2000   | mouth Every other    |           |         |          |           |
| UNITS                | day.                 |           |         |          |           |
| TABSIndications:     |                      |           |         |          |           |
| Unspecified          |                      |           |         |          |           |
 
| hypertensive kidney  |                      |           |         |          |           |
| disease with chronic |                      |           |         |          |           |
|  kidney disease      |                      |           |         |          |           |
| stage I through      |                      |           |         |          |           |
| stage IV, or         |                      |           |         |          |           |
| unspecified(403.90), |                      |           |         |          |           |
|  FSGS (focal         |                      |           |         |          |           |
| segmental            |                      |           |         |          |           |
| glomerulosclerosis), |                      |           |         |          |           |
|  Hyperlipidemia,     |                      |           |         |          |           |
| Complications of     |                      |           |         |          |           |
| transplanted kidney  |                      |           |         |          |           |
+----------------------+----------------------+-----------+---------+----------+-----------+
|   glucose blood VI   | Check blood sugar    |   100     | 12      | 20 |           |
| test strips (ONE     | before each meal and | each      |         | 12       |           |
| TOUCH ULTRA TEST)    |  as directed         |           |         |          |           |
| stripIndications:    |                      |           |         |          |           |
| Unspecified          |                      |           |         |          |           |
| hypertensive kidney  |                      |           |         |          |           |
| disease with chronic |                      |           |         |          |           |
|  kidney disease      |                      |           |         |          |           |
| stage I through      |                      |           |         |          |           |
| stage IV, or         |                      |           |         |          |           |
| unspecified(403.90), |                      |           |         |          |           |
|  Complications of    |                      |           |         |          |           |
| transplanted kidney, |                      |           |         |          |           |
|  Diabetes mellitus   |                      |           |         |          |           |
| type II,             |                      |           |         |          |           |
| uncontrolled (HCC),  |                      |           |         |          |           |
| Hyperlipidemia       |                      |           |         |          |           |
+----------------------+----------------------+-----------+---------+----------+-----------+
|   Respiratory        | Decrease CPAP to     |   1 each  | 0       | 20 |           |
| Therapy Supplies     | 12-18 cmH2O          |           |         | 13       |           |
| MISC                 | Diagnosis            |           |         |          |           |
|                      | Code(s)327.23.       |           |         |          |           |
|                      | Please send order to |           |         |          |           |
|                      |  [facility].     |           |         |          |           |
+----------------------+----------------------+-----------+---------+----------+-----------+
|   allopurinol        | Take 1 tablet by     |   30      | 11      | 02/10/20 |  |
| (ZYLOPRIM) 100 mg    | mouth Daily.         | tablet    |         | 14       | 5         |
| tablet               |                      |           |         |          |           |
+----------------------+----------------------+-----------+---------+----------+-----------+
|   aspirin 81 MG EC   | Take 81 mg by mouth  |           | 0       | 20 |  |
| tablet               | Daily.               |           |         | 12       | 5         |
+----------------------+----------------------+-----------+---------+----------+-----------+
|   cinacalcet         | Take 1 tablet by     |   30      | 12      | 20 |  |
| (SENSIPAR) 30 mg     | mouth Daily.         | tablet    |         | 13       | 4         |
| tablet               |                      |           |         |          |           |
+----------------------+----------------------+-----------+---------+----------+-----------+
|   fludrocortisone    | Take 1 tablet by     |   45      | 2       | 20 |  |
| (FLORINEF) 0.1 mg    | mouth Every other    | tablet    |         | 14       | 5         |
| tablet               | day.                 |           |         |          |           |
+----------------------+----------------------+-----------+---------+----------+-----------+
|   fluticasone        | Inhale 1 puff into   |   1       | 11      | 20 |  |
| (FLOVENT HFA) 220    | the lungs 2 times    | Inhaler   |         | 13       | 5         |
| mcg/puff inhaler     | daily. Rinse mouth   |           |         |          |           |
|                      | after use.           |           |         |          |           |
+----------------------+----------------------+-----------+---------+----------+-----------+
|   furosemide (LASIX) | Take 1 tablet by     |   30      | 5       | 20 |  |
|  40 mg tablet        | mouth Daily.         | tablet    |         | 13       | 4         |
 
+----------------------+----------------------+-----------+---------+----------+-----------+
|   insulin glargine   | Inject 20 Units      |   10 mL   | 0       | 20 | 07/31/201 |
| (LANTUS) 100         | under the skin every |           |         | 13       | 4         |
| units/mL             |  morning.            |           |         |          |           |
| injectionIndications |                      |           |         |          |           |
| : Unspecified        |                      |           |         |          |           |
| hypertensive kidney  |                      |           |         |          |           |
| disease with chronic |                      |           |         |          |           |
|  kidney disease      |                      |           |         |          |           |
| stage I through      |                      |           |         |          |           |
| stage IV, or         |                      |           |         |          |           |
| unspecified(403.90), |                      |           |         |          |           |
|  Complications of    |                      |           |         |          |           |
| transplanted kidney, |                      |           |         |          |           |
|  Diabetes mellitus   |                      |           |         |          |           |
| type II,             |                      |           |         |          |           |
| uncontrolled (HCC),  |                      |           |         |          |           |
| Hyperlipidemia       |                      |           |         |          |           |
+----------------------+----------------------+-----------+---------+----------+-----------+
|   insulin lispro     | Inject               |   10 pen  | 5       | 20 |  |
| (HUMALOG) 100        | subcutaneously       |           |         | 13       | 4         |
| units/mL injection   | before meals         |           |         |          |           |
|                      | according to sliding |           |         |          |           |
|                      |  scale               |           |         |          |           |
+----------------------+----------------------+-----------+---------+----------+-----------+
|   Insulin            | Use before meals and |   100     | 11      | 20 |  |
| Syringe-Needle U-100 |  as directed.        | each      |         | 13       | 4         |
|  (BD INSULIN SYRINGE |                      |           |         |          |           |
|  ULTRAFINE) 31G X    |                      |           |         |          |           |
| " 0.5 ML MISC    |                      |           |         |          |           |
+----------------------+----------------------+-----------+---------+----------+-----------+
|   levothyroxine      | Take 1 tablet by     |   30      | 11      | 20 | 10/06/201 |
| (LEVOTHROID) 50 mcg  | mouth Daily.         | tablet    |         | 13       | 4         |
| tablet               |                      |           |         |          |           |
+----------------------+----------------------+-----------+---------+----------+-----------+
|   lisinopril         | Take 1 tablet by     |   90      | 3       | 20 |  |
| (PRINIVIL,ZESTRIL)   | mouth Daily.         | tablet    |         | 13       | 4         |
| 30 MG tablet         |                      |           |         |          |           |
+----------------------+----------------------+-----------+---------+----------+-----------+
|   loperamide         | Take 2 mg by mouth   |           | 0       | 20 |  |
| (ANTI-DIARRHEAL) 2   | Daily as needed.     |           |         | 12       | 4         |
| mg capsule           |                      |           |         |          |           |
+----------------------+----------------------+-----------+---------+----------+-----------+
|   magnesium oxide    | Take 1 tablet by     |   62      | 12      | 20 |  |
| (MAG-OX) 400 mg      | mouth 2 times daily. | tablet    |         | 13       | 4         |
| tablet               |                      |           |         |          |           |
+----------------------+----------------------+-----------+---------+----------+-----------+
|   metoprolol         | Take 1 tablet by     |   60      | 11      | 20 |  |
| tartrate (LOPRESSOR) | mouth 2 times daily. | tablet    |         | 13       | 4         |
|  25 mg tablet        |                      |           |         |          |           |
+----------------------+----------------------+-----------+---------+----------+-----------+
|   mycophenolate      | Take 250 mg by mouth |           | 0       | 20 | 10/14/201 |
| (CELLCEPT) 250 mg    |  3 times daily.      |           |         | 11       | 5         |
| capsule              |                      |           |         |          |           |
+----------------------+----------------------+-----------+---------+----------+-----------+
|   omeprazole         | Take one capsule by  |   90      | 3       | 20 |  |
| (PRILOSEC) 20 mg     | mouth once daily on  | capsule   |         | 13       | 4         |
| capsule              | an empty stomach     |           |         |          |           |
+----------------------+----------------------+-----------+---------+----------+-----------+
|   predniSONE         | Take 1 tablet by     |   90      | 3       | 20 |  |
 
| (DELTASONE) 5 mg     | mouth Daily.         | tablet    |         | 14       | 5         |
| tablet               |                      |           |         |          |           |
+----------------------+----------------------+-----------+---------+----------+-----------+
|   Prenatal           | Take 0.8 mg by mouth |   30 each | 11      | 20 |  |
| Multivit-Min-Fe-FA   |  Daily.              |           |         | 13       | 4         |
| (PRENATAL VITAMINS)  |                      |           |         |          |           |
| 0.8 MG TABS          |                      |           |         |          |           |
+----------------------+----------------------+-----------+---------+----------+-----------+
|   Prenatal Vit-Fe    |                      |           | 0       | 20 |  |
| Fumarate-FA (PNV     |                      |           |         | 13       | 4         |
| PRENATAL PLUS        |                      |           |         |          |           |
| MULTIVITAMIN) 27-1   |                      |           |         |          |           |
| MG TABS              |                      |           |         |          |           |
+----------------------+----------------------+-----------+---------+----------+-----------+
|   rosuvastatin       | Take 1 tablet by     |   30      | 11      | 20 |  |
| (CRESTOR) 20 mg      | mouth nightly.       | tablet    |         | 13       | 4         |
| tablet               |                      |           |         |          |           |
+----------------------+----------------------+-----------+---------+----------+-----------+
|   tacrolimus         | TAD.                 |           | 0       | 20 | 07/15/201 |
| (PROGRAF) 0.5 mg     |                      |           |         | 12       | 4         |
| capsuleIndications:  |                      |           |         |          |           |
| Unspecified          |                      |           |         |          |           |
| hypertensive kidney  |                      |           |         |          |           |
| disease with chronic |                      |           |         |          |           |
|  kidney disease      |                      |           |         |          |           |
| stage I through      |                      |           |         |          |           |
| stage IV, or         |                      |           |         |          |           |
| unspecified(403.90), |                      |           |         |          |           |
|  Complications of    |                      |           |         |          |           |
| transplanted kidney, |                      |           |         |          |           |
|  Diabetes mellitus   |                      |           |         |          |           |
| type II,             |                      |           |         |          |           |
| uncontrolled (HCC),  |                      |           |         |          |           |
| Hyperlipidemia       |                      |           |         |          |           |
+----------------------+----------------------+-----------+---------+----------+-----------+
|   tacrolimus         | Take 1.5 mg by mouth |           | 0       | 20 |  |
| (PROGRAF) 1 mg       |  2 times daily.      |           |         | 13       | 4         |
| capsuleIndications:  |                      |           |         |          |           |
| Unspecified          |                      |           |         |          |           |
| hypertensive kidney  |                      |           |         |          |           |
| disease with chronic |                      |           |         |          |           |
|  kidney disease      |                      |           |         |          |           |
| stage I through      |                      |           |         |          |           |
| stage IV, or         |                      |           |         |          |           |
| unspecified(403.90), |                      |           |         |          |           |
|  FSGS (focal         |                      |           |         |          |           |
| segmental            |                      |           |         |          |           |
| glomerulosclerosis), |                      |           |         |          |           |
|  Hyperlipidemia,     |                      |           |         |          |           |
| Complications of     |                      |           |         |          |           |
| transplanted kidney  |                      |           |         |          |           |
+----------------------+----------------------+-----------+---------+----------+-----------+
|   valsartan (DIOVAN) | Take 1 tablet by     |   30      | 11      | 20 |  |
|  160 mg tablet       | mouth Daily.         | tablet    |         | 14       | 4         |
+----------------------+----------------------+-----------+---------+----------+-----------+
 documented as of this encounter
 
 Plan of Treatment
 
 
 
+--------+-----------+------------+----------------------+-------------------+
| Date   | Type      | Specialty  | Care Team            | Description       |
+--------+-----------+------------+----------------------+-------------------+
| / | Office    | Cardiology |   Tonia Wilson,    |                   |
|    | Visit     |            | ARNP  [address]   |                   |
|        |           |            | [place]  |                   |
|        |           |            | [address]  [phone]  |                   |
|        |           |            |  [phone] (Fax)  |                   |
+--------+-----------+------------+----------------------+-------------------+
| / | Hospital  | Radiology  |   Popeye Townsend, |                   |
|    | Encounter |            |  MD  [address] |                   |
|        |           |            |  [place],   |                   |
|        |           |            | [place] [address]             |                   |
|        |           |            | [phone]         |                   |
|        |           |            | [phone] (Fax)   |                   |
+--------+-----------+------------+----------------------+-------------------+
| / | Surgery   | Radiology  |   Popeye Townsend, | CV EP PPM SYSTEM  |
|    |           |            |  MD  [address] | IMPLANT           |
|        |           |            |  St.  [place],   |                   |
|        |           |            | [place] [address]             |                   |
|        |           |            | [phone]         |                   |
|        |           |            | [phone] (Fax)   |                   |
+--------+-----------+------------+----------------------+-------------------+
| 02/10/ | Clinical  | Cardiology |                      |                   |
|    | Support   |            |                      |                   |
+--------+-----------+------------+----------------------+-------------------+
| / | Office    | Cardiology |   Tonia Wilson,    |                   |
|    | Visit     |            | ARNP  [address]   |                   |
|        |           |            | [place]  |                   |
|        |           |            | [address]  [phone]  |                   |
|        |           |            |  [phone] (Fax)  |                   |
+--------+-----------+------------+----------------------+-------------------+
| / | Off-Site  | Nephrology |   Marzena Moser  |                   |
|    | Visit     |            | DO ETIENNE  [address] [place] [address]      |                   |
|        |           |            | [address]      |                   |
|        |           |            | [place]      |                   |
|        |           |            | [address]  [phone]  |                   |
|        |           |            |  [phone] (Fax)  |                   |
+--------+-----------+------------+----------------------+-------------------+
 documented as of this encounter
 
 Procedures
 
 
+----------------------+--------+-------------+----------------------+----------------------
+
| Procedure Name       | Priori | Date/Time   | Associated Diagnosis | Comments             
|
|                      | ty     |             |                      |                      
|
+----------------------+--------+-------------+----------------------+----------------------
+
| NM NUCLEAR STRESS    | Routin | 2014  |   Other chest pain   |   Results for this   
|
| TEST (PHARMACOLOGIC  | e      | 11:56 AM    | CAD (coronary artery | procedure are in the 
|
| - VASODILATOR)       |        | PDT         |  disease)  Pre-op    |  results section.    
|
|                      |        |             | evaluation           |                      
|
 
+----------------------+--------+-------------+----------------------+----------------------
+
 documented in this encounter
 
 Results
 NM Nuclear Stress Test (Vasodilator) (2014 11:56 AM PDT)
 
+----------+
| Specimen |
+----------+
|          |
+----------+
 
 
 
+------------------------------------------------------------------------+-----------------+
| Impressions                                                            | Performed At    |
+------------------------------------------------------------------------+-----------------+
|      1.    Persantine EKG is negative.  2.   Normal   Persantine       |   [facility]    |
| Sestamibi myocardial perfusion study with a normal   left ventricular  | [facility]        |
| size and wall thickness.   Preserved left ventricular   systolic       | [facility]  |
| function.   LVEF by gated SPECT 78%.              Signed by: Popeye    | - IMAGING       |
| MD [facility]     2014, 12:12                                |                 |
+------------------------------------------------------------------------+-----------------+
 
 
 
+------------------------------------------------------------------------+-----------------+
| Narrative                                                              | Performed At    |
+------------------------------------------------------------------------+-----------------+
|                     NUCLEAR MEDICINE STRESS TEST REPORT                |   [facility]    |
| Patient Name: Viviana Ricci   Study Date:   2014   Primary Care  | [facility]        |
| Provider: Marzena Moser DO   MRN:   [id number]   :           | [facility]  |
|   1945 Age:   68 y.o. Gender: female      CLINICAL               | - IMAGING       |
| HISTORY/DIAGNOSIS:   Chest pain      PERSANTINE SESTAMIBI STRESS TEST  |                 |
|  Indication:  Chest pain      Procedure:   In the supine position, 60  |                 |
| mg of   Persantine was infused intravenously   over 4 minutes.   Blood |                 |
|  pressure and EKG were monitored every 1 minute.   5   mL of normal    |                 |
| saline was utilized to flush the IV line.   2.5 minutes later,   10.2  |                 |
| mCi sestamibi intravenous injection.   SPECT myocardial perfusion      |                 |
| imaging was acquired with wall motion analysis.   Rest imaging was     |                 |
| performed using 31.8 mCi Sestamibi intravenous injection.   Repeated   |                 |
| SPECT   myocardial perfusion imaging was acquired with wall motion     |                 |
| analysis.   At   the end of the procedure, 75 mg of aminophylline was  |                 |
| infused   intravenously.     Hemodynamics:     Heart rate baseline 85  |                 |
| beats per minute, peak 90 beats per minute.   Blood   pressure         |                 |
| baseline 108/70 mmHg, peak 100/72mmHg.   EKG baseline underlying       |                 |
| sinus rhythm.   Normal EKG.   Peak unchanged.     Side Effects:        |                 |
|   None.     Arrhythmia:   None.     Persantine sestamibi Myocardial    |                 |
| Perfusion Imaging Result:          The Persantine Sestamibi            |                 |
| tomographic images, reviewed without the   attenuation compensation    |                 |
| resolution, revealed a normal myocardial   perfusion pattern as seen   |                 |
| in short axis, vertical long axis, and   horizontal long axis          |                 |
| projections. The left ventricular cavity is normal.   The rest imaging |                 |
|  is also normal.               Gated SPECT reveals a normal left       |                 |
| ventricular wall thickness and motion.     Preserved left ventricular  |                 |
| systolic function. LVEF by gated SPECT is 78%.                         |                 |
+------------------------------------------------------------------------+-----------------+
 
 
 
 
+-------------------------------------------------------------------------------------------
--------------------------------------------------------------------------------------------
----------------+
| Procedure Note                                                                            
                                                                                            
                |
+-------------------------------------------------------------------------------------------
--------------------------------------------------------------------------------------------
----------------+
|   Popeye Townsend MD - 2014  1:03 PM PDT  Formatting of this note might be       
                                                                                            
                |
| different from the original.     NUCLEAR MEDICINE STRESS TEST REPORT Patient Name: Viviana   
                                                                                            
                |
| Deedee Ricci  Study Date:  2014 Primary Care Provider: Marzena Moser DO  MRN:     
                                                                                            
                |
| [id number] :  1945 Age:  68 y.o. Gender: female   CLINICAL HISTORY/DIAGNOSIS:   
                                                                                            
                |
|  Chest pain PERSANTINE SESTAMIBI STRESS TESTIndication:Chest pain Procedure: In the       
                                                                                            
                |
| supine position, 60 mg of  Persantine was infused intravenously over 4 minutes.  Blood    
                                                                                            
                |
| pressure and EKG were monitored every 1 minute.  5 mL of normal saline was utilized to    
                                                                                            
                |
| flush the IV line.  2.5 minutes later, 10.2 mCi sestamibi intravenous injection.  SPECT   
                                                                                            
                |
| myocardial perfusion imaging was acquired with wall motion analysis.  Rest imaging was    
                                                                                            
                |
| performed using 31.8 mCi Sestamibi intravenous injection.  Repeated SPECT myocardial      
                                                                                            
                |
| perfusion imaging was acquired with wall motion analysis.  At the end of the procedure,   
                                                                                            
                |
| 75 mg of aminophylline was infused intravenously.Hemodynamics:  Heart rate baseline 85    
                                                                                            
                |
| beats per minute, peak 90 beats per minute.  Blood pressure baseline 108/70 mmHg, peak    
                                                                                            
                |
| 100/72mmHg.  EKG baseline underlying sinus rhythm.  Normal EKG.  Peak unchanged.Side      
                                                                                            
                |
| Effects:  None.Arrhythmia:  None.Persantine sestamibi Myocardial Perfusion Imaging        
                                                                                            
                |
| Result:      The Persantine Sestamibi tomographic images, reviewed without the            
                                                                                            
                |
| attenuation compensation resolution, revealed a normal myocardial perfusion pattern as    
                                                                                            
 
                |
| seen in short axis, vertical long axis, and horizontal long axis projections. The left    
                                                                                            
                |
| ventricular cavity is normal. The rest imaging is also normal.        Gated SPECT         
                                                                                            
                |
| reveals a normal left ventricular wall thickness and motion.  Preserved left ventricular  
                                                                                            
                |
|  systolic function. LVEF by gated SPECT is 78%.   IMPRESSION: 1.   Persantine EKG is      
                                                                                            
                |
| negative.2.  Normal  Persantine Sestamibi myocardial perfusion study with a normal left   
                                                                                            
                |
| ventricular size and wall thickness.  Preserved left ventricular systolic function.       
                                                                                            
                |
| LVEF by gated SPECT 78%.Signed by: Popeye Townsend MD [facility]  2014, 12:12            
                                                                                            
                |
|                                                                                           
                                                                                            
                |
|Side Effects:  None.                                                                       
                                                                                            
                |
|                                                                                           
                                                                                            
                |
|Arrhythmia:  None.                                                                         
                                                                                            
                |
|                                                                                           
                                                                                            
                |
|Persantine sestamibi Myocardial Perfusion Imaging Result:                                  
                                                                                            
                |
| The Persantine Sestamibi tomographic images, reviewed without the attenuation compensation
 resolution, revealed a normal myocardial perfusion pattern as seen in short axis, vertical 
long axis, and  |
|horizontal long axis projections. The left ventricular cavity is normal. The rest imaging i
s also normal.                                                                              
                |
|                                                                                           
                                                                                            
                |
| Gated SPECT reveals a normal left ventricular wall thickness and motion.  Preserved left v
entricular systolic function. LVEF by gated SPECT is 78%.                                   
                |
|                                                                                           
                                                                                            
                |
|IMPRESSION:                                                                                
                                                                                            
                |
|                                                                                           
                                                                                            
 
                |
|1.   Persantine EKG is negative.                                                           
                                                                                            
                |
|2.  Normal  Persantine Sestamibi myocardial perfusion study with a normal left ventricular 
size and wall thickness.  Preserved left ventricular systolic function.  LVEF by gated SPECT
 78%.           |
|                                                                                           
                                                                                            
                |
|                                                                                           
                                                                                            
                |
|                                                                                           
                                                                                            
                |
|                                                                                           
                                                                                            
                |
|Signed by: Popeye Townsend MD [facility]                                                       
                                                                                            
                |
|  2014, 12:12                                                                         
                                                                                            
                |
+-------------------------------------------------------------------------------------------
--------------------------------------------------------------------------------------------
----------------+
 
 
 
+----------------------+---------------------+--------------------+----------------+
| Performing           | Address             | City/State/Zipcode | Phone Number   |
| Organization         |                     |                    |                |
+----------------------+---------------------+--------------------+----------------+
|   [facility]     |   [address] | [place]    |   [phone] |
| [facility]  |                     | [address]              |                |
| - IMAGING            |                     |                    |                |
+----------------------+---------------------+--------------------+----------------+
 documented in this encounter
 
 Visit Diagnoses
 
 
+------------------------------------------------------------------------------------------+
| Diagnosis                                                                                |
+------------------------------------------------------------------------------------------+
|   Other chest pain                                                                       |
+------------------------------------------------------------------------------------------+
|   CAD (coronary artery disease)  Coronary atherosclerosis of unspecified type of vessel, |
|  native or graft                                                                         |
+------------------------------------------------------------------------------------------+
|   Pre-op evaluation  Preoperative examination, unspecified                               |
+------------------------------------------------------------------------------------------+
 documented in this encounter
 
 Administered Medications
 
 
+-----------------------------------+--------+----------+-------+------+------+
 
| Medication Order                  | MAR    | Action   | Dose  | Rate | Site |
|                                   | Action | Date     |       |      |      |
+-----------------------------------+--------+----------+-------+------+------+
|   aminophylline injection 75 mg   | Given  | 20 | 75 mg |      |      |
| 75 mg, Intravenous, ONCE PRN,     |        | 14  8:26 |       |      |      |
| relieve symptoms, Starting Fri    |        |  AM PDT  |       |      |      |
| 14 at 0825, For 1 dose,      |        |          |       |      |      |
| Nuclear Medicine                  |        |          |       |      |      |
+-----------------------------------+--------+----------+-------+------+------+
 
 
 
+---+---+
|   |   |
+---+---+
 
 
 
+-----------------------------------+-------+----------+----------+--------+---+
|   dipyridamole (PERSANTINE) 60mg  | Given | 20 | 17.8352  | 713.4  |   |
| in 40 mL NS syringe  0.142        |       | 14  8:45 | mg/min   | mL/hr  |   |
| mg/kg/min                         |       |  AM PDT  |          |        |   |
|  125.6 kg (rounded to 713.4       |       |          |          |        |   |
| mL/hr), Intravenous, Administer   |       |          |          |        |   |
| over 4 Minutes, ONCE, Fri 14 |       |          |          |        |   |
|  at 0845, For 1 dose, Nuclear     |       |          |          |        |   |
| Medicine                          |       |          |          |        |   |
+-----------------------------------+-------+----------+----------+--------+---+
 
 
 
+---+---+
|   |   |
+---+---+
 
 
 
+-----------------------------------+-------+----------+----------+---+---+
|   technetium TC-99M sestamibi     | Given | 20 | 10.2     |   |   |
| (CARDIOLITE) injection 9          |       | 14  8:26 | -millicu |   |   |
| millicurie  9 -millicurie,        |       |  AM PDT  | perla      |   |   |
| Intravenous, ONCE PRN, Other,     |       |          |          |   |   |
| Starting 14 at 0826, For |       |          |          |   |   |
|  1 dose, Nuclear Medicine         |       |          |          |   |   |
+-----------------------------------+-------+----------+----------+---+---+
 
 
 
+---+---+
|   |   |
+---+---+
 documented in this encounter

## 2020-01-08 NOTE — XMS
Encounter Summary
  Created on: 2020
 
 Viviana Ricci
 External Reference #: [id number]
 : 47
 Sex: Female
 
 Demographics
 
 
+-----------------------+----------------------+
| Address               | [address]      |
|                       | [place]  [address] |
+-----------------------+----------------------+
| Home Phone            | [phone]      |
+-----------------------+----------------------+
| Preferred Language    | Unknown              |
+-----------------------+----------------------+
| Marital Status        | Single               |
+-----------------------+----------------------+
| [employer] Affiliation | 1009                 |
+-----------------------+----------------------+
| Race                  | Unknown              |
+-----------------------+----------------------+
| Ethnic Group          | Unknown              |
+-----------------------+----------------------+
 
 
 Author
 
 
+--------------+--------------------------------------------+
| Author       | [facility]  |
|              | and [facility]                                |
+--------------+--------------------------------------------+
| Organization | [facility]  |
|              | and [facility]                                |
+--------------+--------------------------------------------+
| Address      | Unknown                                    |
+--------------+--------------------------------------------+
| Phone        | Unavailable                                |
+--------------+--------------------------------------------+
 
 
 
 Support
 
 
+----------------+--------------+---------------------+-----------------+
| Name           | Relationship | Address             | Phone           |
+----------------+--------------+---------------------+-----------------+
| Ada/Ed Radhames | ECON         | [address]              | [phone] |
|                |              | [place]  |                 |
|                |              |  [address]              |                 |
+----------------+--------------+---------------------+-----------------+
 
 
 
 
 Care Team Providers
 
 
+-----------------------+------+-------------+
| Care Team Member Name | Role | Phone       |
+-----------------------+------+-------------+
 PCP  | Unavailable |
+-----------------------+------+-------------+
 
 
 
 Encounter Details
 
 
+--------+----------+---------------------+----------------------+-------------+
| Date   | Type     | Department          | Care Team            | Description |
+--------+----------+---------------------+----------------------+-------------+
| / | Abstract |   [facility] [place]         |   Marzena Moser  |             |
|    |          | NEPHROLOGY  [address]   | M, DO  [address]      |             |
|        |          | [address]   | [address]      |             |
|        |          | [place], [place]     | [place]      |             |
|        |          | [address]          | [address]  [phone]  |             |
|        |          | [phone]        |  [phone] (Fax)  |             |
+--------+----------+---------------------+----------------------+-------------+
 
 
 
 Social History
 
 
+--------------+-------+-----------+--------+------+
| Tobacco Use  | Types | Packs/Day | Years  | Date |
|              |       |           | Used   |      |
+--------------+-------+-----------+--------+------+
| Never Smoker |       |           |        |      |
+--------------+-------+-----------+--------+------+
 
 
 
+---------------------+---+---+---+
| Smokeless Tobacco:  |   |   |   |
| Never Used          |   |   |   |
+---------------------+---+---+---+
 
 
 
+---------------------------------------------------------------+
| Comments: some second hand smoke exposure, but fairly minimal |
+---------------------------------------------------------------+
 
 
 
+-------------+-------------+---------+----------+
| Alcohol Use | Drinks/Week | oz/Week | Comments |
+-------------+-------------+---------+----------+
| No          |             |         |          |
+-------------+-------------+---------+----------+
 
 
 
 
+------------------+---------------+
| Sex Assigned at  | Date Recorded |
| Birth            |               |
+------------------+---------------+
| Not on file      |               |
+------------------+---------------+
 
 
 
+----------------+-------------+-------------+
| Job Start Date | Occupation  | Industry    |
+----------------+-------------+-------------+
| Not on file    | Not on file | Not on file |
+----------------+-------------+-------------+
 
 
 
+----------------+--------------+------------+
| Travel History | Travel Start | Travel End |
+----------------+--------------+------------+
 
 
 
+-------------------------------------+
| No recent travel history available. |
+-------------------------------------+
 documented as of this encounter
 
 Plan of Treatment
 
 
+--------+-----------+------------+----------------------+-------------------+
| Date   | Type      | Specialty  | Care Team            | Description       |
+--------+-----------+------------+----------------------+-------------------+
| / | Office    | Cardiology |   Tonia Wilson,    |                   |
|    | Visit     |            | ARNJESSICA  [address]   |                   |
|        |           |            | [place]  |                   |
|        |           |            | [address]  [phone]  |                   |
|        |           |            |  [phone] (Fax)  |                   |
+--------+-----------+------------+----------------------+-------------------+
| / | Hospital  | Radiology  |   Popeye Townsend, |                   |
|    | Encounter |            |  MD  [address] |                   |
|        |           |            |  St.  [place],   |                   |
|        |           |            | [place] [address]             |                   |
|        |           |            | [phone]         |                   |
|        |           |            | [phone] (Fax)   |                   |
+--------+-----------+------------+----------------------+-------------------+
| / | Surgery   | Radiology  |   Popeye Townsend, | CV EP PPM SYSTEM  |
|    |           |            |  MD  [address] | IMPLANT           |
|        |           |            |  St.  [place],   |                   |
|        |           |            | [place] [address]             |                   |
|        |           |            | [phone]         |                   |
|        |           |            | [phone] (Fax)   |                   |
+--------+-----------+------------+----------------------+-------------------+
| 02/10/ | Clinical  | Cardiology |                      |                   |
|    | Support   |            |                      |                   |
+--------+-----------+------------+----------------------+-------------------+
| / | Office    | Cardiology |   Tonia Wilson,    |                   |
|    | Visit     |            | ARNP  [address]   |                   |
 
|        |           |            | St  [place], [place]  |                   |
|        |           |            | [address]  [phone]  |                   |
|        |           |            |  [phone] (Fax)  |                   |
+--------+-----------+------------+----------------------+-------------------+
| / | Off-Site  | Nephrology |   Marzena Moser  |                   |
| 2020   | Visit     |            | DO ETIENNE  [address] [place] [address]      |                   |
|        |           |            | [address]      |                   |
|        |           |            | [place]      |                   |
|        |           |            | [address]  [phone]  |                   |
|        |           |            |  [phone] (Fax)  |                   |
+--------+-----------+------------+----------------------+-------------------+
 documented as of this encounter
 
 Procedures
 
 
+----------------------+--------+------------+----------------------+----------------------+
| Procedure Name       | Priori | Date/Time  | Associated Diagnosis | Comments             |
|                      | ty     |            |                      |                      |
+----------------------+--------+------------+----------------------+----------------------+
| EXTERNAL LAB:        | Routin | 2016 |                      |   Results for this   |
| URINALYSIS           | e      |            |                      | procedure are in the |
|                      |        |            |                      |  results section.    |
+----------------------+--------+------------+----------------------+----------------------+
| URINALYSIS W/CULTURE | Routin | 2016 |                      |   Results for this   |
|  IF INDICATED        | e      |            |                      | procedure are in the |
|                      |        |            |                      |  results section.    |
+----------------------+--------+------------+----------------------+----------------------+
| URINALYSIS W/CULTURE | Routin | 2016 |                      |   Results for this   |
|  IF INDICATED        | e      |            |                      | procedure are in the |
|                      |        |            |                      |  results section.    |
+----------------------+--------+------------+----------------------+----------------------+
| URINALYSIS W/CULTURE | Routin | 2016 |                      |   Results for this   |
|  IF INDICATED        | e      |            |                      | procedure are in the |
|                      |        |            |                      |  results section.    |
+----------------------+--------+------------+----------------------+----------------------+
| URINALYSIS W/CULTURE | Routin | 2016 |                      |   Results for this   |
|  IF INDICATED        | e      |            |                      | procedure are in the |
|                      |        |            |                      |  results section.    |
+----------------------+--------+------------+----------------------+----------------------+
| URINALYSIS W/CULTURE | Routin | 2016 |                      |   Results for this   |
|  IF INDICATED        | e      |            |                      | procedure are in the |
|                      |        |            |                      |  results section.    |
+----------------------+--------+------------+----------------------+----------------------+
| URINALYSIS W/CULTURE | Routin | 2016 |                      |   Results for this   |
|  IF INDICATED        | e      |            |                      | procedure are in the |
|                      |        |            |                      |  results section.    |
+----------------------+--------+------------+----------------------+----------------------+
| URINALYSIS W/CULTURE | Routin | 2016 |                      |   Results for this   |
|  IF INDICATED        | e      |            |                      | procedure are in the |
|                      |        |            |                      |  results section.    |
+----------------------+--------+------------+----------------------+----------------------+
| URINALYSIS W/CULTURE | Routin | 2016 |                      |   Results for this   |
|  IF INDICATED        | e      |            |                      | procedure are in the |
|                      |        |            |                      |  results section.    |
+----------------------+--------+------------+----------------------+----------------------+
| URINALYSIS W/CULTURE | Routin | 2016 |                      |   Results for this   |
|  IF INDICATED        | e      |            |                      | procedure are in the |
|                      |        |            |                      |  results section.    |
+----------------------+--------+------------+----------------------+----------------------+
 
| URINALYSIS W/CULTURE | Routin | 2016 |                      |   Results for this   |
|  IF INDICATED        | e      |            |                      | procedure are in the |
|                      |        |            |                      |  results section.    |
+----------------------+--------+------------+----------------------+----------------------+
| URINALYSIS W/CULTURE | Routin | 2016 |                      |   Results for this   |
|  IF INDICATED        | e      |            |                      | procedure are in the |
|                      |        |            |                      |  results section.    |
+----------------------+--------+------------+----------------------+----------------------+
 documented in this encounter
 
 Results
 Urinalysis w/Culture if Indicated (2016)
 
+-------------+--------+---------------+------------+--------------+
| Component   | Value  | Ref Range     | Performed  | Pathologist  |
|             |        |               | At         | Signature    |
+-------------+--------+---------------+------------+--------------+
| BACTERIA UA | 3+ (A) | Negative /HPF |            |              |
+-------------+--------+---------------+------------+--------------+
 
 
 
+-----------------+
| Specimen        |
+-----------------+
| Urine specimen  |
| (specimen)      |
+-----------------+
 Urinalysis w/Culture if Indicated (2016)
 
+------------+----------+-----------+------------+--------------+
| Component  | Value    | Ref Range | Performed  | Pathologist  |
|            |          |           | At         | Signature    |
+------------+----------+-----------+------------+--------------+
| CRYSTAL UA | Negative |           |            |              |
+------------+----------+-----------+------------+--------------+
 
 
 
+-----------------+
| Specimen        |
+-----------------+
| Urine specimen  |
| (specimen)      |
+-----------------+
 Urinalysis w/Culture if Indicated (2016)
 
+-------------+----------+-----------+------------+--------------+
| Component   | Value    | Ref Range | Performed  | Pathologist  |
|             |          |           | At         | Signature    |
+-------------+----------+-----------+------------+--------------+
| Epithelial  | Negative |           |            |              |
| Cells       |          |           |            |              |
+-------------+----------+-----------+------------+--------------+
 
 
 
+-----------------+
| Specimen        |
+-----------------+
 
| Urine specimen  |
| (specimen)      |
+-----------------+
 Urinalysis w/Culture if Indicated (2016)
 
+-----------+----------+-----------+------------+--------------+
| Component | Value    | Ref Range | Performed  | Pathologist  |
|           |          |           | At         | Signature    |
+-----------+----------+-----------+------------+--------------+
| CASTS     | Negative |           |            |              |
+-----------+----------+-----------+------------+--------------+
 
 
 
+-----------------+
| Specimen        |
+-----------------+
| Urine specimen  |
| (specimen)      |
+-----------------+
 Urinalysis w/Culture if Indicated (2016)
 
+-----------+--------+------------+------------+--------------+
| Component | Value  | Ref Range  | Performed  | Pathologist  |
|           |        |            | At         | Signature    |
+-----------+--------+------------+------------+--------------+
| WBC       | 30 (A) | 0 - 4 /HPF |            |              |
+-----------+--------+------------+------------+--------------+
 
 
 
+-----------------+
| Specimen        |
+-----------------+
| Urine specimen  |
| (specimen)      |
+-----------------+
 Urinalysis w/Culture if Indicated (2016)
 
+-----------+----------+-----------+------------+--------------+
| Component | Value    | Ref Range | Performed  | Pathologist  |
|           |          |           | At         | Signature    |
+-----------+----------+-----------+------------+--------------+
| CASTS     | Negative |           |            |              |
+-----------+----------+-----------+------------+--------------+
 
 
 
+-----------------+
| Specimen        |
+-----------------+
| Urine specimen  |
| (specimen)      |
+-----------------+
 Urinalysis w/Culture if Indicated (2016)
 
+-------------+--------+-----------------+------------+--------------+
| Component   | Value  | Ref Range       | Performed  | Pathologist  |
|             |        |                 | At         | Signature    |
+-------------+--------+-----------------+------------+--------------+
 
| Urobilinoge | Normal | < 0.2 mg/dL,    |            |              |
| n, Urine    |        | 1.0 mg/dL, 4.0  |            |              |
|             |        | mg/dL, Normal,  |            |              |
|             |        | 1.0 E.U./dL,    |            |              |
|             |        | 0.2 E.U./dL,    |            |              |
|             |        | 0.2 mg/dL,      |            |              |
|             |        | Negative, 1     |            |              |
|             |        | mg/dL, <2.0     |            |              |
|             |        | mg/dL           |            |              |
+-------------+--------+-----------------+------------+--------------+
 
 
 
+-----------------+
| Specimen        |
+-----------------+
| Urine specimen  |
| (specimen)      |
+-----------------+
 Urinalysis w/Culture if Indicated (2016)
 
+-----------+--------------+-----------+------------+--------------+
| Component | Value        | Ref Range | Performed  | Pathologist  |
|           |              |           | At         | Signature    |
+-----------+--------------+-----------+------------+--------------+
| Nitrite,  | Positive (A) | Negative  |            |              |
| Urine     |              |           |            |              |
+-----------+--------------+-----------+------------+--------------+
 
 
 
+-----------------+
| Specimen        |
+-----------------+
| Urine specimen  |
| (specimen)      |
+-----------------+
 Urinalysis w/Culture if Indicated (2016)
 
+-------------+----------+-----------+------------+--------------+
| Component   | Value    | Ref Range | Performed  | Pathologist  |
|             |          |           | At         | Signature    |
+-------------+----------+-----------+------------+--------------+
| Bilirubin,  | Negative | Negative  |            |              |
| Urine       |          |           |            |              |
+-------------+----------+-----------+------------+--------------+
 
 
 
+-----------------+
| Specimen        |
+-----------------+
| Urine specimen  |
| (specimen)      |
+-----------------+
 Urinalysis w/Culture if Indicated (2016)
 
+-----------+-------+-----------+------------+--------------+
| Component | Value | Ref Range | Performed  | Pathologist  |
|           |       |           | At         | Signature    |
 
+-----------+-------+-----------+------------+--------------+
| Clarity   | Clear |           |            |              |
+-----------+-------+-----------+------------+--------------+
 
 
 
+-----------------+
| Specimen        |
+-----------------+
| Urine specimen  |
| (specimen)      |
+-----------------+
 Urinalysis w/Culture if Indicated (2016)
 
+-----------+--------+----------------+------------+--------------+
| Component | Value  | Ref Range      | Performed  | Pathologist  |
|           |        |                | At         | Signature    |
+-----------+--------+----------------+------------+--------------+
| Color,    | Yellow | Light Yellow,  |            |              |
| Urine     |        | Yellow         |            |              |
+-----------+--------+----------------+------------+--------------+
 
 
 
+-----------------+
| Specimen        |
+-----------------+
| Urine specimen  |
| (specimen)      |
+-----------------+
 External Lab: Urinalysis (2016)
 
+-------------+----------+--------------+------------+--------------+
| Component   | Value    | Ref Range    | Performed  | Pathologist  |
|             |          |              | At         | Signature    |
+-------------+----------+--------------+------------+--------------+
| UA Blood,   | Negative |              | EXTERNAL   |              |
| External    |          |              | LAB        |              |
+-------------+----------+--------------+------------+--------------+
| UA Glucose, | Normal   |              | EXTERNAL   |              |
|  External   |          |              | LAB        |              |
+-------------+----------+--------------+------------+--------------+
| UA Ketones, | Negative |              | EXTERNAL   |              |
|  External   |          |              | LAB        |              |
+-------------+----------+--------------+------------+--------------+
| UA Ph,      | 5        | 5 - 9        | EXTERNAL   |              |
| External    |          |              | LAB        |              |
+-------------+----------+--------------+------------+--------------+
| UA          | Negative |              | EXTERNAL   |              |
| Proteins,   |          |              | LAB        |              |
| External    |          |              |            |              |
+-------------+----------+--------------+------------+--------------+
| UA RBC,     | 0        | 0 - 4        | EXTERNAL   |              |
| External    |          |              | LAB        |              |
+-------------+----------+--------------+------------+--------------+
| UA Specific | 1.012    | 1.005 - 1.03 | EXTERNAL   |              |
|  Gravity,   |          |              | LAB        |              |
| External    |          |              |            |              |
+-------------+----------+--------------+------------+--------------+
| UA          | 25 (A)   | 0 - 0        | EXTERNAL   |              |
 
| Leukocyte   |          |              | LAB        |              |
| Esterase,   |          |              |            |              |
| External    |          |              |            |              |
+-------------+----------+--------------+------------+--------------+
 
 
 
+------------------------------------+
| Resulting Agency Comment           |
+------------------------------------+
|   [facility] |
+------------------------------------+
 
 
 
+----------------+---------+--------------------+--------------+
| Performing     | Address | City/State/Zipcode | Phone Number |
| Organization   |         |                    |              |
+----------------+---------+--------------------+--------------+
|   EXTERNAL LAB |         |                    |              |
+----------------+---------+--------------------+--------------+
 documented in this encounter
 
 Visit Diagnoses
 Not on filedocumented in this encounter

## 2020-01-08 NOTE — XMS
Encounter Summary
  Created on: 2020
 
 Viviana Ricci
 External Reference #: [id number]
 : 47
 Sex: Female
 
 Demographics
 
 
+-----------------------+----------------------+
| Address               | [address]      |
|                       | [place]  [address] |
+-----------------------+----------------------+
| Home Phone            | [phone]      |
+-----------------------+----------------------+
| Preferred Language    | Unknown              |
+-----------------------+----------------------+
| Marital Status        | Single               |
+-----------------------+----------------------+
| [employer] Affiliation | [id number]                 |
+-----------------------+----------------------+
| Race                  | Unknown              |
+-----------------------+----------------------+
| Ethnic Group          | Unknown              |
+-----------------------+----------------------+
 
 
 Author
 
 
+--------------+--------------------------------------------+
| Author       | [facility]  |
|              | and [place]                                |
+--------------+--------------------------------------------+
| Organization | [facility]  |
|              | and [place]                                |
+--------------+--------------------------------------------+
| Address      | Unknown                                    |
+--------------+--------------------------------------------+
| Phone        | Unavailable                                |
+--------------+--------------------------------------------+
 
 
 
 Support
 
 
+----------------+--------------+---------------------+-----------------+
| Name           | Relationship | Address             | Phone           |
+----------------+--------------+---------------------+-----------------+
| Ada/Ed Radhames | ECON         | [place]              | [phone] |
|                |              | [place]  |                 |
|                |              |  [address]              |                 |
+----------------+--------------+---------------------+-----------------+
 
 
 
 
 Care Team Providers
 
 
+-----------------------+------+-------------+
| Care Team Member Name | Role | Phone       |
+-----------------------+------+-------------+
 PCP  | Unavailable |
+-----------------------+------+-------------+
 
 
 
 Reason for Visit
 
 
+-------------------+----------+
| Reason            | Comments |
+-------------------+----------+
| Medication Refill |          |
+-------------------+----------+
 
 
 
 Encounter Details
 
 
+--------+--------+---------------------+----------------------+-------------------+
| Date   | Type   | Department          | Care Team            | Description       |
+--------+--------+---------------------+----------------------+-------------------+
| / | Refill |   [facility]         |   Marzena Msoer  | Medication Refill |
|    |        | NEPHROLOGY  [address]   | M, DO  [address]      |                   |
|        |        | [address]   | [address]      |                   |
|        |        | [place], [place]     | [place], [place]      |                   |
|        |        | [address]          | [address]  [phone]  |                   |
|        |        | [phone]        |  [phone] (Fax)  |                   |
+--------+--------+---------------------+----------------------+-------------------+
 
 
 
 Social History
 
 
+--------------+-------+-----------+--------+------+
| Tobacco Use  | Types | Packs/Day | Years  | Date |
|              |       |           | Used   |      |
+--------------+-------+-----------+--------+------+
| Never Smoker |       |           |        |      |
+--------------+-------+-----------+--------+------+
 
 
 
+---------------------+---+---+---+
| Smokeless Tobacco:  |   |   |   |
| Never Used          |   |   |   |
+---------------------+---+---+---+
 
 
 
+---------------------------------------------------------------+
| Comments: some second hand smoke exposure, but fairly minimal |
 
+---------------------------------------------------------------+
 
 
 
+-------------+-------------+---------+----------+
| Alcohol Use | Drinks/Week | oz/Week | Comments |
+-------------+-------------+---------+----------+
| No          |             |         |          |
+-------------+-------------+---------+----------+
 
 
 
+------------------+---------------+
| Sex Assigned at  | Date Recorded |
| Birth            |               |
+------------------+---------------+
| Not on file      |               |
+------------------+---------------+
 
 
 
+----------------+-------------+-------------+
| Job Start Date | Occupation  | Industry    |
+----------------+-------------+-------------+
| Not on file    | Not on file | Not on file |
+----------------+-------------+-------------+
 
 
 
+----------------+--------------+------------+
| Travel History | Travel Start | Travel End |
+----------------+--------------+------------+
 
 
 
+-------------------------------------+
| No recent travel history available. |
+-------------------------------------+
 documented as of this encounter
 
 Plan of Treatment
 
 
+--------+-----------+------------+----------------------+-------------------+
| Date   | Type      | Specialty  | Care Team            | Description       |
+--------+-----------+------------+----------------------+-------------------+
| / | Office    | Cardiology |   HellalfredoTonia,    |                   |
|    | Visit     |            | ARNP  [address]   |                   |
|        |           |            | St  [place], [place]  |                   |
|        |           |            | [address]  [phone]  |                   |
|        |           |            |  [phone] (Fax)  |                   |
+--------+-----------+------------+----------------------+-------------------+
| / | Hospital  | Radiology  |   Popeye Townsend, |                   |
|    | Encounter |            |  MD  [address] |                   |
|        |           |            |  St.  [place],   |                   |
|        |           |            | [place] [address]             |                   |
|        |           |            | [phone]         |                   |
|        |           |            | [phone] (Fax)   |                   |
+--------+-----------+------------+----------------------+-------------------+
| / | Surgery   | Radiology  |   Popeye Townsend, | CV EP PPM SYSTEM  |
 
|    |           |            |  MD  [address] | IMPLANT           |
|        |           |            |  St.  [place],   |                   |
|        |           |            | [place] [address]             |                   |
|        |           |            | [phone]         |                   |
|        |           |            | [phone] (Fax)   |                   |
+--------+-----------+------------+----------------------+-------------------+
| 02/10/ | Clinical  | Cardiology |                      |                   |
|    | Support   |            |                      |                   |
+--------+-----------+------------+----------------------+-------------------+
| / | Office    | Cardiology |   Tonia Wilson,    |                   |
|    | Visit     |            | ARNP  [address]   |                   |
|        |           |            | [place]  |                   |
|        |           |            | [address]  [phone]  |                   |
|        |           |            |  [phone] (Fax)  |                   |
+--------+-----------+------------+----------------------+-------------------+
| / | Off-Site  | Nephrology |   Marzena Moser  |                   |
|    | Visit     |            | DO ETIENNE  [address] [place] [address]      |                   |
|        |           |            | [address]      |                   |
|        |           |            | [place]      |                   |
|        |           |            | [address]  [phone]  |                   |
|        |           |            |  [phone] (Fax)  |                   |
+--------+-----------+------------+----------------------+-------------------+
 documented as of this encounter
 
 Visit Diagnoses
 Not on filedocumented in this encounter Recommend a blood test

## 2020-01-08 NOTE — XMS
Encounter Summary
  Created on: 2020
 
 Viviana Ricci
 External Reference #: [id number]
 : 47
 Sex: Female
 
 Demographics
 
 
+-----------------------+----------------------+
| Address               | [address]      |
|                       | [place]  [address] |
+-----------------------+----------------------+
| Home Phone            | [phone]      |
+-----------------------+----------------------+
| Preferred Language    | Unknown              |
+-----------------------+----------------------+
| Marital Status        | Single               |
+-----------------------+----------------------+
| Adventist Affiliation | [id number]                 |
+-----------------------+----------------------+
| Race                  | Unknown              |
+-----------------------+----------------------+
| Ethnic Group          | Unknown              |
+-----------------------+----------------------+
 
 
 Author
 
 
+--------------+--------------------------------------------+
| Author       | [facility]  |
|              | and [place]                                |
+--------------+--------------------------------------------+
| Organization | [facility]  |
|              | and [place]                                |
+--------------+--------------------------------------------+
| Address      | Unknown                                    |
+--------------+--------------------------------------------+
| Phone        | Unavailable                                |
+--------------+--------------------------------------------+
 
 
 
 Support
 
 
+----------------+--------------+---------------------+-----------------+
| Name           | Relationship | Address             | Phone           |
+----------------+--------------+---------------------+-----------------+
| Ada/Ed Radhames | ECON         | [address]              | [phone] |
|                |              | [place]  |                 |
|                |              |  [address]              |                 |
+----------------+--------------+---------------------+-----------------+
 
 
 
 
 Care Team Providers
 
 
+-----------------------+------+-------------+
| Care Team Member Name | Role | Phone       |
+-----------------------+------+-------------+
 PCP  | Unavailable |
+-----------------------+------+-------------+
 
 
 
 Encounter Details
 
 
+--------+-----------+---------------------+----------------------+----------------------+
| Date   | Type      | Department          | Care Team            | Description          |
+--------+-----------+---------------------+----------------------+----------------------+
| / | Off-Site  |   [facility] [place]         |   Marzena Moser  | Unspecified          |
|    | Visit     | NEPHROLOGY  [address]   | M,   [address]      | hypertensive kidney  |
|        |           | [address]   | [address]      | disease with chronic |
|        |           | [place], [place]     | [place]      |  kidney disease      |
|        |           | [address]          | [address]  [phone]  | stage I through      |
|        |           | [phone]        |  [phone] (Fax)  | stage IV, or         |
|        |           |                     |                      | unspecified (Primary |
|        |           |                     |                      |  Dx); FSGS (focal    |
|        |           |                     |                      | segmental            |
|        |           |                     |                      | glomerulosclerosis); |
|        |           |                     |                      |  Hyperlipidemia;     |
|        |           |                     |                      | Complications of     |
|        |           |                     |                      | transplanted kidney  |
+--------+-----------+---------------------+----------------------+----------------------+
 
 
 
 Social History
 
 
+--------------+-------+-----------+--------+------+
| Tobacco Use  | Types | Packs/Day | Years  | Date |
|              |       |           | Used   |      |
+--------------+-------+-----------+--------+------+
| Never Smoker |       |           |        |      |
+--------------+-------+-----------+--------+------+
 
 
 
+---------------------+---+---+---+
| Smokeless Tobacco:  |   |   |   |
| Never Used          |   |   |   |
+---------------------+---+---+---+
 
 
 
+---------------------------------------------------------------+
| Comments: some second hand smoke exposure, but fairly minimal |
+---------------------------------------------------------------+
 
 
 
 
+-------------+-------------+---------+----------+
| Alcohol Use | Drinks/Week | oz/Week | Comments |
+-------------+-------------+---------+----------+
| No          |             |         |          |
+-------------+-------------+---------+----------+
 
 
 
+------------------+---------------+
| Sex Assigned at  | Date Recorded |
| Birth            |               |
+------------------+---------------+
| Not on file      |               |
+------------------+---------------+
 
 
 
+----------------+-------------+-------------+
| Job Start Date | Occupation  | Industry    |
+----------------+-------------+-------------+
| Not on file    | Not on file | Not on file |
+----------------+-------------+-------------+
 
 
 
+----------------+--------------+------------+
| Travel History | Travel Start | Travel End |
+----------------+--------------+------------+
 
 
 
+-------------------------------------+
| No recent travel history available. |
+-------------------------------------+
 documented as of this encounter
 
 Last Filed Vital Signs
 
 
+-------------------+----------------------+----------------------+----------+
| Vital Sign        | Reading              | Time Taken           | Comments |
+-------------------+----------------------+----------------------+----------+
| Blood Pressure    | 140/70               | 2014  3:22 PM  |          |
|                   |                      | PDT                  |          |
+-------------------+----------------------+----------------------+----------+
| Pulse             | -                    | -                    |          |
+-------------------+----------------------+----------------------+----------+
| Temperature       | 36.8   C (98.2   F)  | 2014  3:22 PM  |          |
|                   |                      | PDT                  |          |
+-------------------+----------------------+----------------------+----------+
| Respiratory Rate  | -                    | -                    |          |
+-------------------+----------------------+----------------------+----------+
| Oxygen Saturation | -                    | -                    |          |
+-------------------+----------------------+----------------------+----------+
| Inhaled Oxygen    | -                    | -                    |          |
| Concentration     |                      |                      |          |
+-------------------+----------------------+----------------------+----------+
| Weight            | 128.4 kg (283 lb 1.1 | 2014  3:22 PM  |          |
|                   |  oz)                 | PDT                  |          |
+-------------------+----------------------+----------------------+----------+
 
| Height            | -                    | -                    |          |
+-------------------+----------------------+----------------------+----------+
| Body Mass Index   | 45.69                | 01/15/2014  3:36 PM  |          |
|                   |                      | PST                  |          |
+-------------------+----------------------+----------------------+----------+
 documented in this encounter
 
 Progress Marzena Tamayo DO - 2014  3:11 PM PDTFormatting of this note might be different
 from the original.
 Subjective:  NEPHROLOGY 
  
 Patient ID: Viviana Ricci is a 68 y.o. female.
 
 HPI Comments: 
 Followup for this 68 year old white female s/p renal allograft, 05, with previous allo
graft dysfunction secondary to FSGS, also with Type II DM, hypertension, hypothyroidism, hyp
erlipidemia, SHPTH,  previous low back pain, obesity/JACK, chronic pulmonary  hypertension, h
istorically related to centripetal obesity, and  CAD, s/p CABG x3 vessels,.  
 
 She is now having trouble with worsening pain with ROM, and ambulation in her left knee.  S
he has been conferring with Dr. Edgar Sanders, Orthopedic Surgery about this.  It has limited
 her activity.  She denies cough, fever, chills, or chest pain.
 
 MEDS:
 
 Prograf 1.5 mg, BID
 Mycophenolate 250 mg, TID.
 Prednisone 5 mg, daily.
  
 
 No Known Allergies
 
 Objective:   Blood pressure 140/70, temperature 36.8 C (98.2 F), weight 128.4 kg (283 l
b 1.1 oz).
 
  
 Physical Exam
 
 HEENT: No thrush.
 Heart:  Regular rate and rhythm with no S3, S4, murmur or rub.
 Lungs:   CTA bilaterally, no rales or wheezes.
 Abdomen:  Soft, obese, the renal allograft in RLQ is nontender, normoactive bowel sounds.
 Extremities: No clubbing, cyanosis,  edema, or foot ulcers.
 
 LAB:   
 
 Lab Results 
 Component Value Date 
   3/12/2014 
  K 5.0 3/12/2014 
   3/12/2014 
  CO2 21 3/12/2014 
  BUN 44 3/12/2014 
  CREEX 1.43 3/12/2014 
  EGFREX 37 3/12/2014 
   3/12/2014 
  CALCIUM 10.2 3/12/2014 
  PHOS 3.1 3/12/2014 
   3/12/2014 
 
  XNG5WJD 7.4 3/12/2014 
 
 Lab Results 
 Component Value Date 
  CHOLEX 155 3/12/2014 
  HDLEX 50.2 3/12/2014 
  LDLEX 65 3/12/2014 
  TRIGEX 197 3/12/2014 
 
 Lab Results 
 Component Value Date 
  TACROLIMUS 6.0 3/12/2014 
 .
 
 Lab Results 
 Component Value Date 
  WBCEX 6.8 3/12/2014 
  HGBEX 13.3 3/12/2014 
  HCTEX 39.6 3/12/2014 
  PLTEX 172 3/12/2014 
 
  
 
 Assessment: 
 
 1.  Renal Allograft--allograft function is stable.
 2.  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
 3.  Type 2 DM--good control.
 4.  Hyperlipidemia--stable on crestor.
 5.  Hypertension--good control.
 6.  SHPTH--stable for the circumstances.
 7.  Obesity/JACK/Pulmonary hypertension-- stable. 
 8.  CAD, s/p CABG, --stable on ASA, metoprolol, atorvastatin.
 9.  Type IV RTA--stable.
 10. Chronic Low Back pain--in remission.
 11.  worsening DJD, left knee--ambulation is worse?
 
 Plan: 
 
  
 1.  I discussed with Viviana that her Scr is better than her previous baseline.  Her tacrolimu
s level is very  good.
 2.  Her main concern is the left knee pain and arthritis.  Obviously, due to the allograft 
, she cannot take NSAID's.  If TKR were to become necessary, she is moderately high risk, bu
t I be live that  she could tolerate it.
 3.  Her BP appears stable on  the current dose of lisinopril.
 4.  Will plan to see her back in 5.5 months, to target a clinic closer to her home in [place], [place], which would be on [address] at [place].
 
 CC:    Jose David Dalal M.D., [facility], [facility]
            Edgar Sanders MD, [facility] Orthopedics
 
 Electronically signed by Marzena Moser DO at 2014  5:34 PM PDTdocumented in thi
s encounter
 
 Plan of Treatment
 
 
+--------+-----------+------------+----------------------+-------------------+
| Date   | Type      | Specialty  | Care Team            | Description       |
 
+--------+-----------+------------+----------------------+-------------------+
| / | Office    | Cardiology |   Tonia Wilson,    |                   |
|    | Visit     |            | ARNJESSICA  [address]   |                   |
|        |           |            | St  [place]  |                   |
|        |           |            | [id number] [phone]  |                   |
|        |           |            |  [phone] (Fax)  |                   |
+--------+-----------+------------+----------------------+-------------------+
| / | Hospital  | Radiology  |   Popeye Townsend, |                   |
|    | Encounter |            |  MD  [address] |                   |
|        |           |            |  St.  [place],   |                   |
|        |           |            | [place] [address]             |                   |
|        |           |            | [phone]         |                   |
|        |           |            | [phone] (Fax)   |                   |
+--------+-----------+------------+----------------------+-------------------+
| / | Surgery   | Radiology  |   Popeye Townsend, | CV EP PPM SYSTEM  |
|    |           |            |  MD  [address] | IMPLANT           |
|        |           |            |  St.  [place],   |                   |
|        |           |            | [place] [address]             |                   |
|        |           |            | [phone]         |                   |
|        |           |            | [phone] (Fax)   |                   |
+--------+-----------+------------+----------------------+-------------------+
| 02/10/ | Clinical  | Cardiology |                      |                   |
|    | Support   |            |                      |                   |
+--------+-----------+------------+----------------------+-------------------+
| / | Office    | Cardiology |   Tonia Wilson,    |                   |
|    | Visit     |            | [facility]  [address]   |                   |
|        |           |            |   [place]  |                   |
|        |           |            | [address]  [phone]  |                   |
|        |           |            |  [phone] (Fax)  |                   |
+--------+-----------+------------+----------------------+-------------------+
| / | Off-Site  | Nephrology |   Marzean Moser  |                   |
|    | Visit     |            | DO ETIENNE  [address] [place] [address]      |                   |
|        |           |            | [address]      |                   |
|        |           |            | [place]      |                   |
|        |           |            | [address]  [phone]  |                   |
|        |           |            |  [phone] (Fax)  |                   |
+--------+-----------+------------+----------------------+-------------------+
 documented as of this encounter
 
 Visit Diagnoses
 
 
+-----------------------------------------------------------------------------------------+
| Diagnosis                                                                               |
+-----------------------------------------------------------------------------------------+
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
| stage IV, or unspecified - Primary                                                      |
+-----------------------------------------------------------------------------------------+
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
| membranous glomerulonephritis                                                           |
+-----------------------------------------------------------------------------------------+
|   Hyperlipidemia  Other and unspecified hyperlipidemia                                  |
+-----------------------------------------------------------------------------------------+
|   Complications of transplanted kidney                                                  |
+-----------------------------------------------------------------------------------------+
 documented in this encounter

## 2020-01-08 NOTE — XMS
Encounter Summary
  Created on: 2020
 
 Viviana Ricci
 External Reference #: [id number]
 : 47
 Sex: Female
 
 Demographics
 
 
+-----------------------+----------------------+
| Address               | [address]      |
|                       | [place]  [address] |
+-----------------------+----------------------+
| Home Phone            | [phone]      |
+-----------------------+----------------------+
| Preferred Language    | Unknown              |
+-----------------------+----------------------+
| Marital Status        | Single               |
+-----------------------+----------------------+
| Judaism Affiliation | [id number]                 |
+-----------------------+----------------------+
| Race                  | Unknown              |
+-----------------------+----------------------+
| Ethnic Group          | Unknown              |
+-----------------------+----------------------+
 
 
 Author
 
 
+--------------+--------------------------------------------+
| Author       | [facility]  |
|              | and [facility]                                |
+--------------+--------------------------------------------+
| Organization | [facility]  |
|              | and [facility]                                |
+--------------+--------------------------------------------+
| Address      | Unknown                                    |
+--------------+--------------------------------------------+
| Phone        | Unavailable                                |
+--------------+--------------------------------------------+
 
 
 
 Support
 
 
+----------------+--------------+---------------------+-----------------+
| Name           | Relationship | Address             | Phone           |
+----------------+--------------+---------------------+-----------------+
| Ada/Ed Radhames | ECON         | [address]              | [phone] |
|                |              | [place]  |                 |
|                |              |  [id number]              |                 |
+----------------+--------------+---------------------+-----------------+
 
 
 
 
 Care Team Providers
 
 
+------------------------+------+-----------------+
| Care Team Member Name  | Role | Phone           |
+------------------------+------+-----------------+
| Marzena Moser DO | PCP  | [phone] |
+------------------------+------+-----------------+
 
 
 
 Reason for Visit
 
 
+--------+-----------------+
| Reason | Comments        |
+--------+-----------------+
| Other  | monitor report  |
+--------+-----------------+
 
 
 
 Encounter Details
 
 
+--------+-----------+----------------------+----------------------+-----------------+
| Date   | Type      | Department           | Care Team            | Description     |
+--------+-----------+----------------------+----------------------+-----------------+
| 10/21/ | Telephone |   [facility]          |   Popeye Townsend, | Other (monitor  |
| 2019   |           | CARDIOLOGY  [address]    |  MD  [address] | report )        |
|        |           | [address]  [place], |  St.  [place],   |                 |
|        |           |  [place] [address]       | [place] [address]             |                 |
|        |           | [phone]         | [phone]         |                 |
|        |           |                      | [phone] (Fax)   |                 |
+--------+-----------+----------------------+----------------------+-----------------+
 
 
 
 Social History
 
 
+--------------+-------+-----------+--------+------+
| Tobacco Use  | Types | Packs/Day | Years  | Date |
|              |       |           | Used   |      |
+--------------+-------+-----------+--------+------+
| Never Smoker |       |           |        |      |
+--------------+-------+-----------+--------+------+
 
 
 
+---------------------+---+---+---+
| Smokeless Tobacco:  |   |   |   |
| Never Used          |   |   |   |
+---------------------+---+---+---+
 
 
 
+---------------------------------------------------------------+
| Comments: some second hand smoke exposure, but fairly minimal |
 
+---------------------------------------------------------------+
 
 
 
+-------------+-------------+---------+----------+
| Alcohol Use | Drinks/Week | oz/Week | Comments |
+-------------+-------------+---------+----------+
| No          |             |         |          |
+-------------+-------------+---------+----------+
 
 
 
+------------------+---------------+
| Sex Assigned at  | Date Recorded |
| Birth            |               |
+------------------+---------------+
| Not on file      |               |
+------------------+---------------+
 
 
 
+----------------+-------------+-------------+
| Job Start Date | Occupation  | Industry    |
+----------------+-------------+-------------+
| Not on file    | Not on file | Not on file |
+----------------+-------------+-------------+
 
 
 
+----------------+--------------+------------+
| Travel History | Travel Start | Travel End |
+----------------+--------------+------------+
 
 
 
+-------------------------------------+
| No recent travel history available. |
+-------------------------------------+
 documented as of this encounter
 
 Plan of Treatment
 
 
+--------+-----------+------------+----------------------+-------------------+
| Date   | Type      | Specialty  | Care Team            | Description       |
+--------+-----------+------------+----------------------+-------------------+
| / | Office    | Cardiology |   Tonia Wilson,    |                   |
|    | Visit     |            | ARNP  [address]   |                   |
|        |           |            | [place], [place]  |                   |
|        |           |            | [address]  [phone]  |                   |
|        |           |            |  [phone] (Fax)  |                   |
+--------+-----------+------------+----------------------+-------------------+
| / | Hospital  | Radiology  |   Popeye Townsend, |                   |
|    | Encounter |            |  MD  [address] |                   |
|        |           |            |  [place],   |                   |
|        |           |            | [place] [address]             |                   |
|        |           |            | [phone]         |                   |
|        |           |            | [phone] (Fax)   |                   |
+--------+-----------+------------+----------------------+-------------------+
| / | Surgery   | Radiology  |   Popeye Townsend, | CV EP PPM SYSTEM  |
 
|    |           |            |  MD  [address] | IMPLANT           |
|        |           |            |  [place],   |                   |
|        |           |            | [place] [address]             |                   |
|        |           |            | [phone]         |                   |
|        |           |            | [phone] (Fax)   |                   |
+--------+-----------+------------+----------------------+-------------------+
| 02/10/ | Clinical  | Cardiology |                      |                   |
|    | Support   |            |                      |                   |
+--------+-----------+------------+----------------------+-------------------+
| / | Office    | Cardiology |   Tonia Wilson,    |                   |
|    | Visit     |            | ARNP  [address]   |                   |
|        |           |            | [place]  |                   |
|        |           |            | [id number] [phone]  |                   |
|        |           |            |  [phone] (Fax)  |                   |
+--------+-----------+------------+----------------------+-------------------+
| / | Off-Site  | Nephrology |   Marzena Moser  |                   |
|    | Visit     |            | DO [facility]  [address] [place] [address]      |                   |
|        |           |            | [address]      |                   |
|        |           |            | [place]      |                   |
|        |           |            | [address]  [phone]  |                   |
|        |           |            |  [phone] (Fax)  |                   |
+--------+-----------+------------+----------------------+-------------------+
 documented as of this encounter
 
 Visit Diagnoses
 Not on filedocumented in this encounter

## 2020-01-08 NOTE — XMS
Encounter Summary
  Created on: 2020
 
 Viviana Ricci
 External Reference #: [id number]
 : 47
 Sex: Female
 
 Demographics
 
 
+-----------------------+----------------------+
| Address               | [address]      |
|                       | [place]  [address] |
+-----------------------+----------------------+
| Home Phone            | [phone]      |
+-----------------------+----------------------+
| Preferred Language    | Unknown              |
+-----------------------+----------------------+
| Marital Status        | Single               |
+-----------------------+----------------------+
| Religion Affiliation | [id number]                 |
+-----------------------+----------------------+
| Race                  | Unknown              |
+-----------------------+----------------------+
| Ethnic Group          | Unknown              |
+-----------------------+----------------------+
 
 
 Author
 
 
+--------------+--------------------------------------------+
| Author       | [facility]  |
|              | and [facility]                                |
+--------------+--------------------------------------------+
| Organization | [facility]  |
|              | and [facility]                                |
+--------------+--------------------------------------------+
| Address      | Unknown                                    |
+--------------+--------------------------------------------+
| Phone        | Unavailable                                |
+--------------+--------------------------------------------+
 
 
 
 Support
 
 
+----------------+--------------+---------------------+-----------------+
| Name           | Relationship | Address             | Phone           |
+----------------+--------------+---------------------+-----------------+
| Ada/Ed Radhames | ECON         | [address]              | [phone] |
|                |              | [place]  |                 |
|                |              |  [id number]              |                 |
+----------------+--------------+---------------------+-----------------+
 
 
 
 
 Care Team Providers
 
 
+------------------------+------+-----------------+
| Care Team Member Name  | Role | Phone           |
+------------------------+------+-----------------+
| Marzena Moser DO | PCP  | [phone] |
+------------------------+------+-----------------+
 
 
 
 Reason for Visit
 
 
+--------------------+----------+
| Reason             | Comments |
+--------------------+----------+
| Follow-up, Office  |          |
| Visit              |          |
+--------------------+----------+
| Coronary Artery    |          |
| Disease            |          |
+--------------------+----------+
| Hypertension       |          |
+--------------------+----------+
| Chest Pain         |          |
+--------------------+----------+
| Heart Murmur       |          |
+--------------------+----------+
| Hyperlipidemia     |          |
+--------------------+----------+
 
 
 
 Encounter Details
 
 
+--------+---------+----------------------+----------------------+----------------------+
| Date   | Type    | Department           | Care Team            | Description          |
+--------+---------+----------------------+----------------------+----------------------+
| / | Office  |   [facility]          |   Tonia Wilson,    | Pulmonary            |
| 2019   | Visit   | CARDIOLOGY  [address]    | ARNP  [address]   | hypertension (HCC)   |
|        |         | [address]  [place], | [address]  [place]  | (Primary Dx);        |
|        |         |  [place] [address]       | [address] [phone]  | Coronary artery      |
|        |         | [phone]         |  [phone] (Fax)  | disease involving    |
|        |         |                      |                      | native coronary      |
|        |         |                      |                      | artery of native     |
|        |         |                      |                      | heart without angina |
|        |         |                      |                      |  pectoris; Murmur;   |
|        |         |                      |                      | Cardiomegaly;        |
|        |         |                      |                      | Hypertension,        |
|        |         |                      |                      | essential; Chest     |
|        |         |                      |                      | pain, unspecified    |
|        |         |                      |                      | type; Mixed          |
|        |         |                      |                      | hyperlipidemia;      |
|        |         |                      |                      | Persistent atrial    |
|        |         |                      |                      | fibrillation; NSVT   |
|        |         |                      |                      | (nonsustained        |
|        |         |                      |                      | ventricular          |
 
|        |         |                      |                      | tachycardia) (HCC)   |
|        |         |                      |                      | and ventricular      |
|        |         |                      |                      | ectopy               |
+--------+---------+----------------------+----------------------+----------------------+
 
 
 
 Social History
 
 
+--------------+-------+-----------+--------+------+
| Tobacco Use  | Types | Packs/Day | Years  | Date |
|              |       |           | Used   |      |
+--------------+-------+-----------+--------+------+
| Never Smoker |       |           |        |      |
+--------------+-------+-----------+--------+------+
 
 
 
+---------------------+---+---+---+
| Smokeless Tobacco:  |   |   |   |
| Never Used          |   |   |   |
+---------------------+---+---+---+
 
 
 
+---------------------------------------------------------------+
| Comments: some second hand smoke exposure, but fairly minimal |
+---------------------------------------------------------------+
 
 
 
+-------------+-------------+---------+----------+
| Alcohol Use | Drinks/Week | oz/Week | Comments |
+-------------+-------------+---------+----------+
| No          |             |         |          |
+-------------+-------------+---------+----------+
 
 
 
+------------------+---------------+
| Sex Assigned at  | Date Recorded |
| Birth            |               |
+------------------+---------------+
| Not on file      |               |
+------------------+---------------+
 
 
 
+----------------+-------------+-------------+
| Job Start Date | Occupation  | Industry    |
+----------------+-------------+-------------+
| Not on file    | Not on file | Not on file |
+----------------+-------------+-------------+
 
 
 
+----------------+--------------+------------+
| Travel History | Travel Start | Travel End |
+----------------+--------------+------------+
 
 
 
 
+-------------------------------------+
| No recent travel history available. |
+-------------------------------------+
 documented as of this encounter
 
 Last Filed Vital Signs
 
 
+-------------------+-------------------+----------------------+----------------------+
| Vital Sign        | Reading           | Time Taken           | Comments             |
+-------------------+-------------------+----------------------+----------------------+
| Blood Pressure    | 92/64             | 2019 11:44 AM  |                      |
|                   |                   | PST                  |                      |
+-------------------+-------------------+----------------------+----------------------+
| Pulse             | 42                | 2019 11:44 AM  | irregular Manual: 63 |
|                   |                   | PST                  |  Pulse Ox            |
+-------------------+-------------------+----------------------+----------------------+
| Temperature       | -                 | -                    |                      |
+-------------------+-------------------+----------------------+----------------------+
| Respiratory Rate  | 18                | 2019 11:44 AM  |                      |
|                   |                   | PST                  |                      |
+-------------------+-------------------+----------------------+----------------------+
| Oxygen Saturation | 96%               | 2019 11:44 AM  |                      |
|                   |                   | PST                  |                      |
+-------------------+-------------------+----------------------+----------------------+
| Inhaled Oxygen    | -                 | -                    |                      |
| Concentration     |                   |                      |                      |
+-------------------+-------------------+----------------------+----------------------+
| Weight            | 115.5 kg (254 lb  | 2019 11:44 AM  |                      |
|                   | 10.1 oz)          | PST                  |                      |
+-------------------+-------------------+----------------------+----------------------+
| Height            | 167.6 cm (5' 6")  | 2019 11:44 AM  |                      |
|                   |                   | PST                  |                      |
+-------------------+-------------------+----------------------+----------------------+
| Body Mass Index   | 41.1              | 2019 11:44 AM  |                      |
|                   |                   | PST                  |                      |
+-------------------+-------------------+----------------------+----------------------+
 documented in this encounter
 
 Patient Instructions
 Patient Instructions Enid Shah RN - 2019 11:30 AM PSTProcedure: Pacemaker I
mplantation
 
 Date:____________________________________________
 
 Check-In Time:____________________________________
 
 1. Check-In at [facility].
 2. Do not eat or drink anything after midnight the night prior to the procedure.
 3. Please hold: Eliquis for two days prior to procedure.
 4. Take all of your regular medications with a sip of water the morning of the procedure ex
cept Eliquis as listed above..
 5. For Implant you will stay the night in ICU, for Battery/Generator change or Loop recorde
r you will be able to go home the same day.  You will need someone to drive you home.
 
   Wound Check: Nurse only - At [facility], 4th floor      Cardiology
 
 
              Date:_____________________________________
 
             Check-in Time:_____________________________
  
 
 Initial/Next Device check will be in 3 months:
  Provider: BELKIS Arredondo
 
  Date:_______________________________________
  
  Check-In Time:_______________________________
  
 Electronically signed by Enid Shah RN at 2019 12:57 PM PST
 documented in this encounter
 
 Progress Notes
 Tonia Wilson ARNP - 2019 11:30 AM PSTFormatting of this note might be different fr
om the original.
    
 
 PATIENT NAME:  Viviana Ricci  
 : 1945:         AGE: 73 y.o.
  PRIMARY CARE:
 Marzena Moser DO
 CC:    
 
 OUTPATIENT FOLLOW UP VISIT
 
 Date of Service: 2019 
 
 HISTORY OF PRESENT ILLNESS:
 Viviana Ricci is a 73 y.o. female with a history of coronary artery disease post CABG x 3 
in , history of diabetes, renal failure post a renal transplantation, morbid obesity, in
activity, hypertension, hyperlipidemia, pulmonary hypertension and severe arthritis.  She is
 being seen today for follow up coronary artery disease.
 
 She was last seen 10/10/2019 at which time she was scheduled for a 14 day mobile cardiac te
lemetry, scheduled for a persantine nuclear medicine stress test, scheduled for an echocardi
ogram, referred to cardiac rehab, and she was started on Eliquis 5 mg  twice daily for strok
e prevention..  Since that time, she has completed all testing ordered from her last visit. 
 NM Nuclear Stress Test was performed on 10/30/2019 showed an ejection fraction of 63%.  Ech
ocardiogram was completed on 10/30/2019 which showed an ejection fraction of 55-60%, mildly 
thickened and calcified trileaflet and mitral valves, mild tricuspid valve regurgitations. M
obile cardiac telemetry from 10/16/2019 to 10/30/2019 showed atrial fibrillation 33 to 149 b
eats per minute, PVC's and ventricular couplets, 4 beat run of nonsustained ventricular tach
ycardia, a rapid ventricular response with a heart rate of 149 beats per minute, and pauses 
up to 3.8 seconds.   Today she notes that her main complaint is her back pain.
 
 She has had a poor energy level.  She has not been very active.  She enjoys watch plays, [employer], and spending time with family and friends in her spare time.  She has not had any ches
t pain or discomfort at rest or with exertion.   She has had shortness of breath with minima
l exertion. She attributes this to her pulmonary hypertension. Currently however she can onl
y walk about 50 feet before she feels she needs to stop to take a breath, whereas last year 
she could walk through the whole parking lot into the [employer], nearly a block, before she fel
t she needed to stop to rest.  She has dizziness with on  from 11-12:30.. She notes sh
rosario had pushed herself to go to [employer] despite severe pain in back all the way up to her neck,
 and that started to make her feel lightheaded / dizzy. This caused her to shake, which her 
fellow parishioners noted and they brought her water and then she felt better by 12:30.
 
 She has not noticed palpitations. She knows she has abnormal beats, but she never has been 
 
able to feel them. She has had leg swelling bilaterally, more on the left than right.  She s
tates the left is more swollen than the right because of blood clot.  She sleeps on 1 pillow
 at night without any shortness of breath.  She sleeps with CPAP machine in place nightly wi
th oxygen bled in at 2 liters per minute.  She states she wakes up most of the time rested, 
with an average of 6-8 hours of sleep per night.
 
 MEDICAL, SURGICAL, AND PERSONAL HISTORY
 Past Medical, Surgical, Family, and Social History are reviewed in EPIC.
 
 CURRENT PROBLEMS
 Patient Active Problem List 
 Diagnosis 
   Chronic low back pain 
   Hypothyroidism 
   Mixed hyperlipidemia 
   Complications of transplanted kidney 
   Movement disorder 
   Diabetes mellitus type II, uncontrolled  
   Pulmonary hypertension  
   Coronary artery disease involving native coronary artery of native heart without angina
 pectoris 
   JACK on CPAP 
   Obesity hypoventilation syndrome  
   Kidney replaced by transplant 
   Secondary hyperparathyroidism  
   Dyspnea 
   FSGS (focal segmental glomerulosclerosis) 
   Murmur 
   Cardiomegaly 
   Hypertension, essential 
   PLMD (periodic limb movement disorder) 
   Chest pain 
   UTI (lower urinary tract infection) 
   Renal transplant recipient 
   Thyroid activity decreased 
   Type 2 DM with CKD stage 2 and hypertension  
   Persistent atrial fibrillation 
   NSVT (nonsustained ventricular tachycardia)  and ventricular ectopy 
 
 CURRENT MEDICATIONS
 Current Outpatient Medications 
 Medication Sig Dispense Refill 
   allopurinol (ZYLOPRIM) 100 mg tablet take 1 tablet by mouth once daily 30 tablet 11 
   apixaban (ELIQUIS) 5 mg tablet Take 1 tablet by mouth 2 times daily. 180 tablet 3 
   aspirin 81 mg EC tablet Take 81 mg by mouth Daily.   
   B-D INS SYRINGE 0.5CC/31GX5/16 31G X 5/16" 0.5 ML MISC USE BEFORE MEALS AS DIRECTED 1 e
ach 11 
   cholecalciferol (VITAMIN D-3) 2000 UNITS TABS Take 2,000 Units by mouth Every other day
.   
   cinacalcet (SENSIPAR) 30 mg tablet take 1 tablet by mouth once daily 90 tablet 3 
   cyclobenzaprine (FLEXERIL) 10 mg tablet Take 1 tablet by mouth Twice  daily as needed f
or Muscle spasms. 45 tablet 0 
   fludrocortisone (FLORINEF) 0.1 mg tablet Take 1 tablet by mouth Every other day. 45 tab
let 3 
   furosemide (LASIX) 40 mg tablet Take 1 tablet by mouth Daily as needed. 30 tablet 11 
   glucose blood VI test strips (ONE TOUCH ULTRA TEST) strip Check blood sugar before each
 meal and as directed 100 each 12 
   insulin glargine (LANTUS) 100 units/mL injection (vial) inject 20 units subcutaneously 
every morning 10 vial 11 
   insulin lispro (HUMALOG) 100 units/mL injection (vial) inject subcutaneously BEFORE CHIKA
 
LS ACCORDING TO SLIDING SCALE Max dose 20 units daily (Patient taking differently: inject henry
bcutaneously BEFORE MEALS ACCORDING TO SLIDING SCALE Max dose 8 units daily) 10 vial 11 
   levothyroxine (SYNTHROID) 50 mcg tablet take 1 tablet by mouth once daily 30 tablet 11 
   lisinopril (PRINIVIL,ZESTRIL) 30 MG tablet take 1 tablet by mouth once daily 90 tablet 
3 
   loperamide (ANTI-DIARRHEAL) 2 mg capsule Take 1 capsule by mouth 4 times daily as neede
d. 60 capsule 5 
   losartan (COZAAR) 50 mg tablet take 1 tablet by mouth once daily 90 tablet 3 
   magnesium oxide (MAG-OX) 400 mg tablet take 1 tablet by mouth twice a day 60 tablet 11 
   mycophenolate (CELLCEPT) 250 mg capsule Take 1 capsule by mouth 2 times daily. 60 capsu
le 11 
   predniSONE (DELTASONE) 5 mg tablet take 1 tablet by mouth once daily 90 tablet 3 
   Prenatal Vit-Fe Fumarate-FA (PNV PRENATAL PLUS MULTIVITAMIN) 27-1 MG TABS take 1 tablet
 by mouth once daily. 30 tablet 11 
   Respiratory Therapy Supplies MISC Continue nocturnal O2 at 2 l/m through CPAP for lifet
bart. Dx: JACK G47.33 Please provide new nasal mask for CPAP and any other needed replacement 
supplies. 1 each 0 
   Respiratory Therapy Supplies MISC Decrease CPAP to 12-18 cmH2O Diagnosis Code(s)327.23.
 Please send order to [facility]. 1 each 0 
   rosuvastatin (CRESTOR) 20 mg tablet take 1 tablet by mouth NIGHTLY 30 tablet 11 
   tacrolimus (PROGRAF) 1 mg capsule Take 1 capsule by mouth 2 times daily. For a total do
se of 1 mg, by mouth, 2 times daily. 240 capsule  
 
 No current facility-administered medications for this visit.  
  
 
 ALLERGIES
 Allergies 
 Allergen Reactions 
   Tape [Adhesive & Tape] Rash 
 
 ROS
 Review of Systems 
 Constitutional: Positive for malaise/fatigue (constant). 
 Respiratory: Positive for shortness of breath (with minimal exertion, worse with cold air).
  
 Cardiovascular: Positive for leg swelling (left more than right currently). Negative for ch
est pain and palpitations. 
 Neurological: Positive for dizziness ( while sitting
  with REALLY bad back pain). Negative for weakness. 
      Lightheadedness = No 
  
 
 OBJECTIVE:  
 
 PHYSICAL EXAM
 BP 92/64  | Pulse (!) 42 Comment: irregular Manual: 63 Pulse Ox | Resp 18  | Ht 1.676 m (5'
 6")  | Wt 115.5 kg (254 lb 10.1 oz)  | SpO2 96%  | Breastfeeding No  | BMI 41.10 kg/m 
 Physical Exam 
 Constitutional: She is oriented to person, place, and time. She appears well-developed and 
well-nourished. 
 Elderly female in no acute distress, arrived alone 
 Neck: Normal carotid pulses and no JVD present. Carotid bruit is not present. 
 Cardiovascular: Normal rate, regular rhythm, S1 normal, S2 normal and intact distal pulses.
 Frequent extrasystoles are present. PMI is not displaced. Exam reveals distant heart sounds
 (due to girth). Exam reveals no gallop and no friction rub. 
 Murmur heard.
  Holosystolic murmur is present with a grade of 1/6.
 Pulses:
      Carotid pulses are 2+ on the right side and 2+ on the left side.
 
      Posterior tibial pulses are 1+ on the right side and 1+ on the left side. 
 Pulmonary/Chest: Effort normal and breath sounds normal. No accessory muscle usage. No resp
iratory distress. She has no wheezes. She has no rhonchi. She has no rales. 
 Abdominal: Soft. Normal appearance and normal aorta. She exhibits no abdominal bruit. There
 is no hepatosplenomegaly (difficult to fully evaluate due to body habitus). There is no ten
derness. Musculoskeletal:    
    General: Edema (trace at right ankle, 1+ on left ankle) present. 
 
 Neurological: She is alert and oriented to person, place, and time. Gait (using front wheel
ed walker) abnormal. 
 Skin: Skin is warm and dry. No cyanosis. Nails show no clubbing. 
 Psychiatric: She has a normal mood and affect. Her mood appears not anxious. She does not e
xhibit a depressed mood. 
 Vitals reviewed.
 
 ECG: I personally independently reviewed ECG tracing during this visit (interpreted and cherise
led by another provider):
 Results for orders placed or performed in visit on 10/10/19 
 ECG 12 lead 
 Result Value Ref Range 
  INTERPRETATION TEXT   
   Possible atrial fib
 Left axis deviation
 Low voltage QRS
 Septal infarct , age undetermined
 Abnormal ECG
 When compared with ECG of 21-AUG-2018 15:01,
 Current undetermined rhythm precludes rhythm comparison, needs review
 ST now depressed in Lateral leads
 Nonspecific T wave abnormality, worse in Inferior leads
 Nonspecific T wave abnormality now evident in Lateral leads
 Confirmed by ERIC REYES, ABEBE ([id number]) on 10/10/2019 2:40:53 PM
  
   Which is compared to today's ECG 2019: atrial fibrillation with 2 ventricular ectop
ics noted, rate 78 beats per minute.
 
 LAB RESULTS reviewed during visit today primarily from [facility]: 
 LIPID
 Lab Results 
 Component Value Date 
  CHOL 162 2013 
  TRIG 225 2013 
  HDL 45 2013 
  LDL 72 2013 
  LDLEX 51 2019 
  HDLEX 51 2019 
  TRIGEX 115 2019 
  CHOLEX 125 2019 
 
 CHEMISTRY
 Lab Results 
 Component Value Date 
   (H) 2018 
  GLUEX 89 2019 
   2018 
  NAEX 144 2019 
  K 5.3 (H) 2018 
  KEX 5.1 2019 
   (H) 2018 
 
  CLEX 110 2019 
  CO2 21 (L) 2018 
  CO2EX 22 2019 
  CALCIUM 9.8 2018 
  ALKPHOS 100 2014 
  AST 20 07/10/2013 
  ASTEX 46 2019 
  ALT 14 07/10/2013 
  ALTEX 37 2019 
  BILITOT 0.6 2014 
  CREA 1.48 (H) 2018 
  BUN 43 (H) 2018 
  EGFR 31.0 07/10/2013 
  EGFREX 35 2019 
  CREEX 1.47 2019 
 
 HEMATOLOGY
 Lab Results 
 Component Value Date 
  WBC 4.6 07/10/2013 
  WBCEX 5.6 2019 
  HGB 11.9 07/10/2013 
  HGBEX 13.9 2019 
  HCT 35.7 07/10/2013 
  HCTEX 42.6 2019 
   (A) 07/10/2013 
  PLTEX 176 2019 
  
 Lab Results 
 Component Value Date 
  TSH 1.26 2018 
  TSHEX 1.85 2019 
   (H) 2013 
   (H) 2013 
 
 RESULTS- I reviewed reports from [facility]:
 
 NM Nuclear Stress Test 10/30/2019: Persantine EKG is negative.  Normal Persantine sestamibi
 myocardial perfusion imaging study.  Normal left ventricular size, wall thickness and motio
n.  Preserved left ventricular systolic function.  LVEF by gated SPECT is 63%.
 
 Echocardiogram Completed 10/30/2019: Mild biatrial dilatation.  Normal left ventricular siz
e with a mild concentric left ventricular hypertrophy.  Left ventricular systolic function i
s preserved.  LVEF is 55-60%.  Mildly thickened and calcified trileaflet aortic valve with a
dequate opening.  There is aortic valve sclerosis without significant aortic valve stenosis.
  Mildly thickened and calcified mitral valve suggesting myxomatous change.  There is a mild
 mitral valve regurgitation.  Mild mitral annular calcification.  Mild tricuspid valve regur
gitation.  Normal right-sided pressure. Dilated IVC with a normal respiratory collapse.  Whe
n compared to echocardiography on 2018, no significant changes.
 
 Mobile cardiac telemetry from 10/16 until 10/30/2019:  Baseline EKG show atrial fibrillatio
n with heart rate ranging from 33 to 149 bpm. PVCs, ventricular couplets, 4 beat run nonsust
ained ventricular tachycardia was noted. Episodes of rapid ventricular response with heart
 rate of 149 bpm  was noted.Pauses up to 3.8-second were present. Findings suggest potential
 tacky/bradycardia syndrome.
 
 Above data and testing is reviewed this visit; testing below is historical data unless othe
rwise specified.
 ASSESSMENT:  
 
 
 1. Coronary artery disease:
 A.  Status post CABG x 3 in  with LIMA to the LAD, SVG to the OM1 and OM2.
             B.  A persantine sestamibi stress test on 07 revealed a medium sized, mod
erate in severity fixed defect of the anterior wall, and a small sized mild in severity fixe
d defect of the inferior wall, LVEF by gated SPECT was 66%.
             C.  Bilateral heart catheterization on 05/03/10, shows severe two vessel corona
ry artery disease, a chronic occlusion through the proximal portion of the left anterior jed
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
rtery, grafts: open left internal mammary artery graft to the mid left anterior descending a
rtery, open saphenous vein grafts to the OM1 and OM2, mildly dilated left ventricular cavity
 with a normal left systolic function, LVEF 70%, mild to moderate pulmonary hypertension and
 a normal cardiac output, coronary circulation is right dominant, normal system pressure.
             D.  Persantine nuclear medicine stress test 14 shows a normal myocardial p
erfusion imaging study with normal left ventricular size, wall thickness, LVEF by gated SPEC
T of 78%.  
             E. 10/10/2019, the ECG showed some changes with frequent PVCs, and based on thi
s it appears to be occurring approximately 40% of the time. With this as well as her increas
ed fatigue and shortness of breath, which was her anginal equivalent prior to her CABG, furt
her evaluation with Persantine nuclear medicine stress test is warranted.
  F. NM Nuclear Stress Test 10/30/2019: Persantine EKG is negative.  Normal Persantine sesta
mibi myocardial perfusion imaging study.  Normal left ventricular size, wall thickness and m
otion.  Preserved left ventricular systolic function.  LVEF by gated SPECT is 63%.
  G. Echocardiogram Completed 10/30/2019: Mild biatrial dilatation.  Normal left ventricular
 size with a mild concentric left ventricular hypertrophy.  Left ventricular systolic functi
on is preserved.  LVEF is 55-60%.  Mildly thickened and calcified trileaflet aortic valve wi
th adequate opening.  There is aortic valve sclerosis without significant aortic valve steno
sis.  Mildly thickened and calcified mitral valve suggesting myxomatous change.  There is a 
mild mitral valve regurgitation.  Mild mitral annular calcification.  Mild tricuspid valve r
egurgitation.  Normal right-sided pressure. Dilated IVC with a normal respiratory collapse. 
 When compared to echocardiography on 2018, no significant changes.
  H. Today, 2019, she is asymptomatic. She is on a medical regimen with aspirin, ARB, 
ACE-I and statin. 
  There are no signs or symptoms of overt congestive heart failure, and her physical exam sh
ows no significant fluid retention.  She is in class III- Symptoms with minimal exertion of 
the [employer] functional class. Heart failure stage A-pre-heart failure. 
 
 2. Persistent Atrial fibrillation with irreversible symptomatic tachycardia/bradycardia syn
drome: 
  A. Her ECG 10/10/19 shows undetermined arrhythmia that appears to be either atrial fibrill
ation or junctional rhythm with frequent unifocal PVCs.  
  B. Mobile cardiac telemetry from 10/16 until 10/30/2019. Baseline EKG show atrial fibrilla
tion with heart rate ranging from 33 to 149 bpm. PVCs, ventricular couplets, 4 beat run nons
ustained ventricular tachycardia was noted. Episodes of rapid ventricular response with he
art rate of 149 bpm  was noted.Pauses up to 3.8-second were present. Findings suggest potent
ial tacky/bradycardia syndrome.
  C. Her risks for stroke include: Hx of HTN (1), Diabetes (1), Vascular disease (1), Age 65
 to 74 (1) and Female Gender (1). Her [id number] score is 5, which gives an estimated 6.7%
 risk of stroke per year in atrial fibrillation.  Her bleeding risks include: >63 YO (1)  an
d NSAIDS/ASA (1).  Her HASBLED score is 2-3, which confers an intermediate risk (4.1-5.8%)  
risk of bleed per year. We will treat her as if she has atrial fibrillation for stroke preve
ntion, and we will start her on Eliquis 5 mg twice daily. She will have mobile cardiac telem
etry for 14 days to evaluate for atrial fibrillation and PVC burden.  She was given 6 weeks 
Eliquis 5 mg samples. Will not send in prescription until it is confirmed on her further kael
luation.
  Today, 2019, she was on rate control with metoprolol tartrate, however given her fidel
ses and significant bradycardia on mobile cardiac telemetry this was stopped about 3 weeks a
go.  She was still having some bradycardia after that on her monitor with rate less than 60 
beats per minute about 7 to 8 hours/day.  She also had a little bit of tachycardia after deshaun
t point with her rate up to 149 bpm.. Her atrial fibrillation is not ideally controlled and 
would benefit from medication adjustment.  However she is not able to tolerate any AV kristin 
 
blocking agents due to her tachycardia/bradycardia syndrome.  This is discussed with her at 
length.  She is recommended to have a permanent pacemaker implantation done to treat her bra
dycardia to allow us to restart her beta-blocker to treat her tachycardia as well as her hea
rt disease. The risks and benefits of the procedure including alternative treatment were dis
cussed with the patient in length. The patient decides to proceed with the procedure. Given 
that her atrial fibrillation is newly diagnosed, I do think she should have dual chamber dev
ice, as she is likely to have episodes of sinus rhythm, and may need the atrial lead.
   
 3. Unifocal PVC's:
  A. 10/10/2019, on the ECG shows more PVCs in office. Estimated burden would be 40%, which 
would be very concerning. She will be scheduled for stress test and mobile cardiac telemetry
 to evaluate etiology as well as burden.
  B. NM Nuclear Stress Test on 10/30/2019 showed no cardiac anomalies.
  C. Mobile Cardiac Telemetry on 10/16-10/30 showed PVC's, with ventricular couplets, 4 beat
 run non sustained ventricular tachycardia, rapid ventricular response with a heart rate of 
149 beats per minute, and with a pause of 3.8 second.  
  D. Today, 2019, she is not aware of these, however they continue on her ECG and are 
non-perfusing when checked on pulse, and adding back beta-blocker would be helpful for that 
as well. 
 
 4. Right sided heart failure/ cor pulmonale / severe pulmonary hypertension:
             A. The echocardiogram from 02/19/10 revealed moderate bi-atrial dilatation and 
normal left ventricular size, wall thickness and motion, LVEF is 55-60%, dilated right ventr
icle with moderate hypo-kinesis, moderate tricuspid valve regurgitation, severe pulmonary hy
pertension with a peak systolic pressure of 80 mmHg, and a small pericardial effusion. 
             B. Echocardiogram from 4/15/14 showed Mild left atrial dilatation. Normal left 
ventricular size, wall thickness and motion. Preserved  left ventricular systolic function. 
LVEF is 70-75%. Grade 1 left ventricular diastole dysfunction. Mild tricuspid valve regurgit
ation. Mild thickened trileaflet aortic valve with adequate opening. A mild aortic valve ins
ufficiency. Normal right-sided pressure.
             C. Echocardiogram 18 showing, moderate left atrial dilatation. Normal lef
t ventricular size with a mild concentric left ventricular hypertrophy.  Left ventricular sy
stolic dysfunction is preserved.  LVEF is 60-65%. Mild aortic root dilatation measuring 3.9 
cm in diameter, mildly thickened and calcified trileaflet aortic valve with adequate opening
. There is aortic valve sclerosis without significant aortic valve stenosis. Mildly thickene
d and calcified mitral valve with a mild mitral valve regurgitation, mild mitral annular claudia
cification. Normal right-sided pressure. Normal IVC with normal respiratory collapse. When c
ompared to echocardiography on 4/15/14, no significant changes.
             D. Echocardiogram 10/30/2019 showed: Normal right-sided pressure, and dilated I
VC with a normal respiratory collapse.
  E. Today, 2019, this is very well controlled.
  
 5. Hypertension, essential:
             A. Today, 2019, her blood pressure is on the low end of normal range.   
  
 6.  Hyperlipidemia, mixed.
             A. Today, 2019, she remains on rosuvastatin.
  
 7.  Obstructive sleep apnea: Not otherwise addressed today 2019. 
             A. She sleeps with CPAP machine in place nightly with oxygen bled in at 2 liter
s per minute.
  
 8.  Morbid obesity.
             A. Today, 2019, Body mass index is 41.1 kg/m.. This is improved since h
er last visit.  
  
 9. Type II diabetes:  Not otherwise addressed today 2019. 
  
 10. Chronic kidney disease: Not otherwise addressed today 2019.  
             A.  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 Followed by Dr. Moser.
 
  
 11.  DVT left leg 2015: Not otherwise addressed today 2019. 
             A.  She took a one year course of apixaban, then on aspirin alone. Her apixaban
 was restarted 10/2019 due to atrial fibrillation.
  
 12.  Hypothyroidism:  Not otherwise addressed today 2019. 
 
 PLAN:  
 
 1. She will be scheduled for dual chamber permanent pacemaker implantation in the near futu
re for her tachy/richard syndrome. It will be a dual chamber device as her atrial fibrillation
 was only recently diagnosed and she may have periods of sinus rhythm as well.
 2. Will plan to restart her metoprolol tartrate 25 mg twice daily after implant to treat he
r atrial fibrillation and ventricular ectopics.
 3. She will have wound check with office RN 1 week post procedure.
 4. She will follow up in 3 months  for office visit and device interrogation, or sooner wit
h concerns.  
 
 Electronically signed by: 
 BELKIS Arredondo on 2019 
 
 Low MENDEZ, Medical Assistant am acting as a scribe on behalf of, and in the presenc
e of BELKIS Arredondo. - Low Pastrana Medical Assistant 2019 11:51 AM  
 I, BELKIS Arredondo, personally performed the services described in this documentation, 
as scribed in my presence and it is both accurate and complete. BELKIS Arredondo 20
19  
 Portions of this chart may have been created with Dragon voice recognition software. Occasi
onal wrong-word or   sound-alike  substitutions may have occurred due to the inherent naqvi
itations of voice recognition software. Please read the chart carefully and recognize, using
 context, where these substitutions have occurred.  
 Electronically signed by BELKIS Arredondo at 2019  2:24 PM PSTdocumented in this e
ncounter
 
 Plan of Treatment
 
 
+--------+-----------+------------+----------------------+-------------------+
| Date   | Type      | Specialty  | Care Team            | Description       |
+--------+-----------+------------+----------------------+-------------------+
| / | Office    | Cardiology |   Tonia Wilson,    |                   |
|    | Visit     |            | ARNP  [address]   |                   |
|        |           |            |   [facility] [place]  |                   |
|        |           |            | [id number] [phone]  |                   |
|        |           |            |  [phone] (Fax)  |                   |
+--------+-----------+------------+----------------------+-------------------+
| / | Hospital  | Radiology  |   Abebe Townsend, |                   |
|    | Encounter |            |  MD  [address] |                   |
|        |           |            |  [place]  [place]   |                   |
|        |           |            | [place] [address]             |                   |
|        |           |            | [phone]         |                   |
|        |           |            | [phone] (Fax)   |                   |
+--------+-----------+------------+----------------------+-------------------+
| / | Surgery   | Radiology  |   Abebe Townsend, | CV EP PPM SYSTEM  |
2020   |           |            |  MD  [address] | IMPLANT           |
|        |           |            |  [place],   |                   |
|        |           |            | [place] [address]             |                   |
|        |           |            | [phone]         |                   |
|        |           |            | [phone] (Fax)   |                   |
+--------+-----------+------------+----------------------+-------------------+
| 02/10/ | Clinical  | Cardiology |                      |                   |
 
|    | Support   |            |                      |                   |
+--------+-----------+------------+----------------------+-------------------+
| / | Office    | Cardiology |   Tonia Wilson,    |                   |
|    | Visit     |            | ARNJESSICA  [address]   |                   |
|        |           |            | [place]  |                   |
|        |           |            | [address]  [phone]  |                   |
|        |           |            |  [phone] (Fax)  |                   |
+--------+-----------+------------+----------------------+-------------------+
| / | Off-Site  | Nephrology |   Marzena Moser  |                   |
|    | Visit     |            | DO ETIENNE  [address]      |                   |
|        |           |            | [address]      |                   |
|        |           |            | [place]      |                   |
|        |           |            | [address]  [phone]  |                   |
|        |           |            |  [phone] (Fax)  |                   |
+--------+-----------+------------+----------------------+-------------------+
 documented as of this encounter
 
 Procedures
 
 
+----------------+--------+-------------+----------------------+----------------------+
| Procedure Name | Priori | Date/Time   | Associated Diagnosis | Comments             |
|                | ty     |             |                      |                      |
+----------------+--------+-------------+----------------------+----------------------+
| ECG 12 LEAD    | Routin | 2019  |   Persistent atrial  |   Results for this   |
|                | e      | 12:02 PM    | fibrillation  NSVT   | procedure are in the |
|                |        | PST         | (nonsustained        |  results section.    |
|                |        |             | ventricular          |                      |
|                |        |             | tachycardia) (HCC)   |                      |
|                |        |             | and ventricular      |                      |
|                |        |             | ectopy               |                      |
+----------------+--------+-------------+----------------------+----------------------+
 documented in this encounter
 
 Results
 ECG 12 lead (2019 12:02 PM PST)
 
+-------------+--------------------------+-----------+------------+--------------+
| Component   | Value                    | Ref Range | Performed  | Pathologist  |
|             |                          |           | At         | Signature    |
+-------------+--------------------------+-----------+------------+--------------+
| VENTRICULAR | 78                       | BPM       | [facility]  |              |
|  RATE EKG   |                          |           |            |              |
+-------------+--------------------------+-----------+------------+--------------+
| ATRIAL RATE | 113                      | BPM       | [facility]  |              |
+-------------+--------------------------+-----------+------------+--------------+
| QRS         | 102                      | ms        | [facility]  |              |
| DURATION    |                          |           |            |              |
+-------------+--------------------------+-----------+------------+--------------+
| Q-T         | 374                      | ms        | [facility]  |              |
| INTERVAL    |                          |           |            |              |
+-------------+--------------------------+-----------+------------+--------------+
| Q-T         | 426                      | ms        | [facility]  |              |
| INTERVAL    |                          |           |            |              |
| (CORRECTED) |                          |           |            |              |
+-------------+--------------------------+-----------+------------+--------------+
| QRS AXIS    | -26                      | degrees   | [facility]  |              |
+-------------+--------------------------+-----------+------------+--------------+
| T AXIS      | 64                       | degrees   | [facility]  |              |
+-------------+--------------------------+-----------+------------+--------------+
 
| INTERPRETAT | Atrial fibrillation with |           | [facility]  |              |
| ION TEXT    |  premature ventricular   |           |            |              |
|             | or aberrantly conducted  |           |            |              |
|             | complexesAnterior        |           |            |              |
|             | infarct (cited on or     |           |            |              |
|             | before                   |           |            |              |
|             | 10-OCT-2019)Abnormal     |           |            |              |
|             | ECGWhen compared with    |           |            |              |
|             | ECG of 10-OCT-2019       |           |            |              |
|             | 13:55,Previous ECG has   |           |            |              |
|             | undetermined rhythm,     |           |            |              |
|             | needs reviewNonspecific  |           |            |              |
|             | T wave abnormality no    |           |            |              |
|             | longer evident in        |           |            |              |
|             | Inferior leadsConfirmed  |           |            |              |
|             | by LOW ONEILL MD     |           |            |              |
|             | ([id number]) on 2019    |           |            |              |
|             | 5:55:27 PM               |           |            |              |
+-------------+--------------------------+-----------+------------+--------------+
 
 
 
+----------+
| Specimen |
+----------+
|          |
+----------+
 
 
 
+----------------------------------------------------------+--------------+
| Narrative                                                | Performed At |
+----------------------------------------------------------+--------------+
|   This result has an attachment that is not available.   |              |
+----------------------------------------------------------+--------------+
 
 
 
+--------------+---------+--------------------+--------------+
| Performing   | Address | City/State/Zipcode | Phone Number |
| Organization |         |                    |              |
+--------------+---------+--------------------+--------------+
|   [facility]  |         |                    |              |
+--------------+---------+--------------------+--------------+
 documented in this encounter
 
 Visit Diagnoses
 
 
+-----------------------------------------------------------------------------------------+
| Diagnosis                                                                               |
+-----------------------------------------------------------------------------------------+
|   Pulmonary hypertension (HCC) - Primary  Other chronic pulmonary heart diseases        |
+-----------------------------------------------------------------------------------------+
|   Coronary artery disease involving native coronary artery of native heart without      |
| angina pectoris                                                                         |
+-----------------------------------------------------------------------------------------+
|   Murmur  Undiagnosed cardiac murmurs                                                   |
+-----------------------------------------------------------------------------------------+
|   Cardiomegaly                                                                          |
 
+-----------------------------------------------------------------------------------------+
|   Hypertension, essential  Unspecified essential hypertension                           |
+-----------------------------------------------------------------------------------------+
|   Chest pain, unspecified type                                                          |
+-----------------------------------------------------------------------------------------+
|   Mixed hyperlipidemia                                                                  |
+-----------------------------------------------------------------------------------------+
|   Persistent atrial fibrillation  Atrial fibrillation                                   |
+-----------------------------------------------------------------------------------------+
|   NSVT (nonsustained ventricular tachycardia) (HCC) and ventricular ectopy  Paroxysmal  |
| ventricular tachycardia                                                                 |
+-----------------------------------------------------------------------------------------+
 documented in this encounter

## 2020-01-08 NOTE — XMS
Encounter Summary
  Created on: 2020
 
 Viviana Ricci
 External Reference #: [id number]
 : 47
 Sex: Female
 
 Demographics
 
 
+-----------------------+----------------------+
| Address               | [address]      |
|                       | [place]  [address] |
+-----------------------+----------------------+
| Home Phone            | [phone]      |
+-----------------------+----------------------+
| Preferred Language    | Unknown              |
+-----------------------+----------------------+
| Marital Status        | Single               |
+-----------------------+----------------------+
| Temple Affiliation | 1009                 |
+-----------------------+----------------------+
| Race                  | Unknown              |
+-----------------------+----------------------+
| Ethnic Group          | Unknown              |
+-----------------------+----------------------+
 
 
 Author
 
 
+--------------+--------------------------------------------+
| Author       | [facility]  |
|              | and [place]                                |
+--------------+--------------------------------------------+
| Organization | [facility]  |
|              | and [place]                                |
+--------------+--------------------------------------------+
| Address      | Unknown                                    |
+--------------+--------------------------------------------+
| Phone        | Unavailable                                |
+--------------+--------------------------------------------+
 
 
 
 Support
 
 
+----------------+--------------+---------------------+-----------------+
| Name           | Relationship | Address             | Phone           |
+----------------+--------------+---------------------+-----------------+
| Ada/Ed Radhames | ECON         | [address]              | [phone] |
|                |              | [place]  |                 |
|                |              |  [address]              |                 |
+----------------+--------------+---------------------+-----------------+
 
 
 
 
 Care Team Providers
 
 
+-----------------------+------+-------------+
| Care Team Member Name | Role | Phone       |
+-----------------------+------+-------------+
 PCP  | Unavailable |
+-----------------------+------+-------------+
 
 
 
 Encounter Details
 
 
+--------+-------------+---------------------+----------------------+-------------+
| Date   | Type        | Department          | Care Team            | Description |
+--------+-------------+---------------------+----------------------+-------------+
| / | Orders Only |   [facility]  [place]         |   Marzena Moser  |             |
|    |             | NEPHROLOGY  [address]   | M, DO  [address]      |             |
|        |             | [address]   | [address]      |             |
|        |             | [place]     | [place]      |             |
|        |             | [address]          | [address]  [phone]  |             |
|        |             | [phone]        |  [phone] (Fax)  |             |
+--------+-------------+---------------------+----------------------+-------------+
 
 
 
 Social History
 
 
+--------------+-------+-----------+--------+------+
| Tobacco Use  | Types | Packs/Day | Years  | Date |
|              |       |           | Used   |      |
+--------------+-------+-----------+--------+------+
| Never Smoker |       |           |        |      |
+--------------+-------+-----------+--------+------+
 
 
 
+---------------------+---+---+---+
| Smokeless Tobacco:  |   |   |   |
| Never Used          |   |   |   |
+---------------------+---+---+---+
 
 
 
+---------------------------------------------------------------+
| Comments: some second hand smoke exposure, but fairly minimal |
+---------------------------------------------------------------+
 
 
 
+-------------+-------------+---------+----------+
| Alcohol Use | Drinks/Week | oz/Week | Comments |
+-------------+-------------+---------+----------+
| No          |             |         |          |
+-------------+-------------+---------+----------+
 
 
 
 
+------------------+---------------+
| Sex Assigned at  | Date Recorded |
| Birth            |               |
+------------------+---------------+
| Not on file      |               |
+------------------+---------------+
 
 
 
+----------------+-------------+-------------+
| Job Start Date | Occupation  | Industry    |
+----------------+-------------+-------------+
| Not on file    | Not on file | Not on file |
+----------------+-------------+-------------+
 
 
 
+----------------+--------------+------------+
| Travel History | Travel Start | Travel End |
+----------------+--------------+------------+
 
 
 
+-------------------------------------+
| No recent travel history available. |
+-------------------------------------+
 documented as of this encounter
 
 Plan of Treatment
 
 
+--------+-----------+------------+----------------------+-------------------+
| Date   | Type      | Specialty  | Care Team            | Description       |
+--------+-----------+------------+----------------------+-------------------+
| / | Office    | Cardiology |   Tonia Wilson,    |                   |
|    | Visit     |            | [address]   |                   |
|        |           |            | [place]  |                   |
|        |           |            | [address] [phone]  |                   |
|        |           |            |  [phone] (Fax)  |                   |
+--------+-----------+------------+----------------------+-------------------+
| / | Hospital  | Radiology  |   Popeye Townsend, |                   |
|    | Encounter |            |  MD [address] |                   |
|        |           |            |  St.  [place],   |                   |
|        |           |            | [place] [address]             |                   |
|        |           |            | [phone]         |                   |
|        |           |            | [phone] (Fax)   |                   |
+--------+-----------+------------+----------------------+-------------------+
| / | Surgery   | Radiology  |   Popeye Townsend, | CV EP PPM SYSTEM  |
|    |           |            |  MD  [address] | IMPLANT           |
|        |           |            |  St.  [place],   |                   |
|        |           |            | [place] [address]             |                   |
|        |           |            | [phone]         |                   |
|        |           |            | [phone] (Fax)   |                   |
+--------+-----------+------------+----------------------+-------------------+
| 02/10/ | Clinical  | Cardiology |                      |                   |
|    | Support   |            |                      |                   |
+--------+-----------+------------+----------------------+-------------------+
| / | Office    | Cardiology |   Tonia Wilson,    |                   |
|    | Visit     |            | [address]   |                   |
 
|        |           |            | St  [place], [place]  |                   |
|        |           |            | [address] [phone]  |                   |
|        |           |            |  [phone] (Fax)  |                   |
+--------+-----------+------------+----------------------+-------------------+
| / | Off-Site  | Nephrology |   Marzena Moser  |                   |
|    | Visit     |            | DO [address]      |                   |
|        |           |            | [address]      |                   |
|        |           |            | [place]      |                   |
|        |           |            | [id number] [phone]  |                   |
|        |           |            |  [phone] (Fax)  |                   |
+--------+-----------+------------+----------------------+-------------------+
 documented as of this encounter
 
 Visit Diagnoses
 Not on filedocumented in this encounter

## 2020-01-08 NOTE — XMS
Encounter Summary
  Created on: 2020
 
 Viviana Ricci
 External Reference #: [id number]
 : 47
 Sex: Female
 
 Demographics
 
 
+-----------------------+----------------------+
| Address               | [address]      |
|                       | [place]  [address] |
+-----------------------+----------------------+
| Home Phone            | [phone]      |
+-----------------------+----------------------+
| Preferred Language    | Unknown              |
+-----------------------+----------------------+
| Marital Status        | Single               |
+-----------------------+----------------------+
| Buddhism Affiliation | 1009                 |
+-----------------------+----------------------+
| Race                  | Unknown              |
+-----------------------+----------------------+
| Ethnic Group          | Unknown              |
+-----------------------+----------------------+
 
 
 Author
 
 
+--------------+--------------------------------------------+
| Author       | [facility]  |
|              | and [place]                                |
+--------------+--------------------------------------------+
| Organization | [facility]  |
|              | and [place]                                |
+--------------+--------------------------------------------+
| Address      | Unknown                                    |
+--------------+--------------------------------------------+
| Phone        | Unavailable                                |
+--------------+--------------------------------------------+
 
 
 
 Support
 
 
+----------------+--------------+---------------------+-----------------+
| Name           | Relationship | Address             | Phone           |
+----------------+--------------+---------------------+-----------------+
| Ada/Ed Radhames | ECON         | [address]              | [phone] |
|                |              | [place]  |                 |
|                |              |  [address]              |                 |
+----------------+--------------+---------------------+-----------------+
 
 
 
 
 Care Team Providers
 
 
+------------------------+------+-----------------+
| Care Team Member Name  | Role | Phone           |
+------------------------+------+-----------------+
| Marzena Moser DO | PCP  | [phone] |
+------------------------+------+-----------------+
 
 
 
 Encounter Details
 
 
+--------+----------+---------------------+----------------------+-------------+
| Date   | Type     | Department          | Care Team            | Description |
+--------+----------+---------------------+----------------------+-------------+
| / | Abstract |   [facility] [place]         |   Marzena Moser  |             |
| 2019   |          | NEPHROLOGY  [address]   | DO [address]  [address]      |             |
|        |          | [address]   | [address]      |             |
|        |          | [place], [place]     | [place], [place]      |             |
|        |          | [address]          | [address]  [phone]  |             |
|        |          | [phone]        |  [phone] (Fax)  |             |
+--------+----------+---------------------+----------------------+-------------+
 
 
 
 Social History
 
 
+--------------+-------+-----------+--------+------+
| Tobacco Use  | Types | Packs/Day | Years  | Date |
|              |       |           | Used   |      |
+--------------+-------+-----------+--------+------+
| Never Smoker |       |           |        |      |
+--------------+-------+-----------+--------+------+
 
 
 
+---------------------+---+---+---+
| Smokeless Tobacco:  |   |   |   |
| Never Used          |   |   |   |
+---------------------+---+---+---+
 
 
 
+---------------------------------------------------------------+
| Comments: some second hand smoke exposure, but fairly minimal |
+---------------------------------------------------------------+
 
 
 
+-------------+-------------+---------+----------+
| Alcohol Use | Drinks/Week | oz/Week | Comments |
+-------------+-------------+---------+----------+
| No          |             |         |          |
+-------------+-------------+---------+----------+
 
 
 
 
+------------------+---------------+
| Sex Assigned at  | Date Recorded |
| Birth            |               |
+------------------+---------------+
| Not on file      |               |
+------------------+---------------+
 
 
 
+----------------+-------------+-------------+
| Job Start Date | Occupation  | Industry    |
+----------------+-------------+-------------+
| Not on file    | Not on file | Not on file |
+----------------+-------------+-------------+
 
 
 
+----------------+--------------+------------+
| Travel History | Travel Start | Travel End |
+----------------+--------------+------------+
 
 
 
+-------------------------------------+
| No recent travel history available. |
+-------------------------------------+
 documented as of this encounter
 
 Progress Notes
 Juju Glass RN - 2019  9:11 AM PDTTacrolimus level- 4.5
 
 Goal- 4-5
 
 Current dose- 1 mg twice daily
 
 Patient notified tacrolimus level within goal range and to continue current dose. She laureen knutson.Electronically signed by Juju Glass RN at 2019  9:17 AM PDTdo
cumented in this encounter
 
 Plan of Treatment
 
 
+--------+-----------+------------+----------------------+-------------------+
| Date   | Type      | Specialty  | Care Team            | Description       |
+--------+-----------+------------+----------------------+-------------------+
| / | Office    | Cardiology |   Tonia Wilson,    |                   |
|    | Visit     |            | [address]   |                   |
|        |           |            | St  [place], [place]  |                   |
|        |           |            | [address]  [phone]  |                   |
|        |           |            |  [phone] (Fax)  |                   |
+--------+-----------+------------+----------------------+-------------------+
| / | Hospital  | Radiology  |   Popeye Townsend, |                   |
|    | Encounter |            |  MD  [address] |                   |
|        |           |            |  St.  [place],   |                   |
|        |           |            | [place] [address]             |                   |
|        |           |            | [phone]         |                   |
|        |           |            | [phone] (Fax)   |                   |
+--------+-----------+------------+----------------------+-------------------+
| / | Surgery   | Radiology  |   Popeye Townsend, | CV EP PPM SYSTEM  |
 
|    |           |            |  MD  [address] | IMPLANT           |
|        |           |            |  St.  [place],   |                   |
|        |           |            | [place] [address]             |                   |
|        |           |            | [phone]         |                   |
|        |           |            | [phone] (Fax)   |                   |
+--------+-----------+------------+----------------------+-------------------+
| 02/10/ | Clinical  | Cardiology |                      |                   |
|    | Support   |            |                      |                   |
+--------+-----------+------------+----------------------+-------------------+
| / | Office    | Cardiology |   Tonia Wilson,    |                   |
|    | Visit     |            | [facility]  [address]   |                   |
|        |           |            | [place]  |                   |
|        |           |            | [address]  [phone]  |                   |
|        |           |            |  [phone] (Fax)  |                   |
+--------+-----------+------------+----------------------+-------------------+
| / | Off-Site  | Nephrology |   Marzena Moser  |                   |
|    | Visit     |            | DO [address]  [address] [place] [address]      |                   |
|        |           |            | [address]      |                   |
|        |           |            | [place]      |                   |
|        |           |            | [address]  [phone]  |                   |
|        |           |            |  [phone] (Fax)  |                   |
+--------+-----------+------------+----------------------+-------------------+
 documented as of this encounter
 
 Procedures
 
 
+--------------------+--------+------------+----------------------+----------------------+
| Procedure Name     | Priori | Date/Time  | Associated Diagnosis | Comments             |
|                    | ty     |            |                      |                      |
+--------------------+--------+------------+----------------------+----------------------+
| TACROLIMUS TROUGH  | Routin | 2019 |                      |   Results for this   |
| LC-MS/MS           | e      |            |                      | procedure are in the |
|                    |        |            |                      |  results section.    |
+--------------------+--------+------------+----------------------+----------------------+
 documented in this encounter
 
 Results
 Tacrolimus Trough, LC-MS/MS (2019)
 
+-------------+-------+-----------+------------+--------------+
| Component   | Value | Ref Range | Performed  | Pathologist  |
|             |       |           | At         | Signature    |
+-------------+-------+-----------+------------+--------------+
| Tacrolimus, | 4.5   |           |            |              |
|  LC-MS/MS,  |       |           |            |              |
| External    |       |           |            |              |
+-------------+-------+-----------+------------+--------------+
 
 
 
+----------+
| Specimen |
+----------+
| Blood    |
+----------+
 documented in this encounter
 
 Visit Diagnoses
 Not on filedocumented in this encounter

## 2020-01-08 NOTE — XMS
Encounter Summary
  Created on: 2020
 
 Viviana Ricci
 External Reference #: [id number]
 : 47
 Sex: Female
 
 Demographics
 
 
+-----------------------+----------------------+
| Address               | [address]      |
|                       | [place]  [address] |
+-----------------------+----------------------+
| Home Phone            | [phone]      |
+-----------------------+----------------------+
| Preferred Language    | Unknown              |
+-----------------------+----------------------+
| Marital Status        | Single               |
+-----------------------+----------------------+
| Anglican Affiliation | 1009                 |
+-----------------------+----------------------+
| Race                  | Unknown              |
+-----------------------+----------------------+
| Ethnic Group          | Unknown              |
+-----------------------+----------------------+
 
 
 Author
 
 
+--------------+--------------------------------------------+
| Author       | [facility]  |
|              | and [place]                                |
+--------------+--------------------------------------------+
| Organization | [facility]  |
|              | and [place]                                |
+--------------+--------------------------------------------+
| Address      | Unknown                                    |
+--------------+--------------------------------------------+
| Phone        | Unavailable                                |
+--------------+--------------------------------------------+
 
 
 
 Support
 
 
+----------------+--------------+---------------------+-----------------+
| Name           | Relationship | Address             | Phone           |
+----------------+--------------+---------------------+-----------------+
| Ada/Ed Radhames | ECON         | [address]              | [phone] |
|                |              | [place]  |                 |
|                |              |  [address]              |                 |
+----------------+--------------+---------------------+-----------------+
 
 
 
 
 Care Team Providers
 
 
+-----------------------+------+-------------+
| Care Team Member Name | Role | Phone       |
+-----------------------+------+-------------+
 PCP  | Unavailable |
+-----------------------+------+-------------+
 
 
 
 Reason for Visit
 
 
+--------+----------+
| Reason | Comments |
+--------+----------+
| Other  |          |
+--------+----------+
 
 
 
 Encounter Details
 
 
+--------+-----------+----------------------+----------------------+-------------+
| Date   | Type      | Department           | Care Team            | Description |
+--------+-----------+----------------------+----------------------+-------------+
| / | Telephone |   [facility]          |   Maricruz Flanagan, | Other       |
|    |           | PULMONARY  [address]     |  RN                  |             |
|        |           | [place]  [place], |                      |             |
|        |           |  [place] [address]       |                      |             |
|        |           | [phone]         |                      |             |
+--------+-----------+----------------------+----------------------+-------------+
 
 
 
 Social History
 
 
+--------------+-------+-----------+--------+------+
| Tobacco Use  | Types | Packs/Day | Years  | Date |
|              |       |           | Used   |      |
+--------------+-------+-----------+--------+------+
| Never Smoker |       |           |        |      |
+--------------+-------+-----------+--------+------+
 
 
 
+---------------------+---+---+---+
| Smokeless Tobacco:  |   |   |   |
| Never Used          |   |   |   |
+---------------------+---+---+---+
 
 
 
+---------------------------------------------------------------+
| Comments: some second hand smoke exposure, but fairly minimal |
+---------------------------------------------------------------+
 
 
 
 
+-------------+-------------+---------+----------+
| Alcohol Use | Drinks/Week | oz/Week | Comments |
+-------------+-------------+---------+----------+
| No          |             |         |          |
+-------------+-------------+---------+----------+
 
 
 
+------------------+---------------+
| Sex Assigned at  | Date Recorded |
| Birth            |               |
+------------------+---------------+
| Not on file      |               |
+------------------+---------------+
 
 
 
+----------------+-------------+-------------+
| Job Start Date | Occupation  | Industry    |
+----------------+-------------+-------------+
| Not on file    | Not on file | Not on file |
+----------------+-------------+-------------+
 
 
 
+----------------+--------------+------------+
| Travel History | Travel Start | Travel End |
+----------------+--------------+------------+
 
 
 
+-------------------------------------+
| No recent travel history available. |
+-------------------------------------+
 documented as of this encounter
 
 Plan of Treatment
 
 
+--------+-----------+------------+----------------------+-------------------+
| Date   | Type      | Specialty  | Care Team            | Description       |
+--------+-----------+------------+----------------------+-------------------+
| / | Office    | Cardiology |   Tonia Wilson,    |                   |
|    | Visit     |            | ARNP  [address]   |                   |
|        |           |            | St  [place] [facility], [place]  |                   |
|        |           |            | [address]  [phone]  |                   |
|        |           |            |  [phone] (Fax)  |                   |
+--------+-----------+------------+----------------------+-------------------+
| / | Hospital  | Radiology  |   Popeye Townsend, |                   |
|    | Encounter |            |  MD  [address] [place] |                   |
|        |           |            |  St.  [place],   |                   |
|        |           |            | [place] [address]             |                   |
|        |           |            | [phone]         |                   |
|        |           |            | [phone] (Fax)   |                   |
+--------+-----------+------------+----------------------+-------------------+
| / | Surgery   | Radiology  |   Popeye Townsend, | CV EP PPM SYSTEM  |
|    |           |            |  MD  [address] | IMPLANT           |
 
|        |           |            |  St.  [place],   |                   |
|        |           |            | [place] [address]             |                   |
|        |           |            | [phone]         |                   |
|        |           |            | [phone] (Fax)   |                   |
+--------+-----------+------------+----------------------+-------------------+
| 02/10/ | Clinical  | Cardiology |                      |                   |
|    | Support   |            |                      |                   |
+--------+-----------+------------+----------------------+-------------------+
| / | Office    | Cardiology |   Hellberg, Tonia,    |                   |
|    | Visit     |            | [facility]  [address]   |                   |
|        |           |            | [place]  |                   |
|        |           |            | [address]  [phone]  |                   |
|        |           |            |  [phone] (Fax)  |                   |
+--------+-----------+------------+----------------------+-------------------+
| / | Off-Site  | Nephrology |   Marzena Moser  |                   |
|    | Visit     |            | DO ETIENNE  [address] [place] [address]      |                   |
|        |           |            | [address]      |                   |
|        |           |            | [place]      |                   |
|        |           |            | [address] [phone]  |                   |
|        |           |            |  [phone] (Fax)  |                   |
+--------+-----------+------------+----------------------+-------------------+
 documented as of this encounter
 
 Visit Diagnoses
 Not on filedocumented in this encounter

## 2020-01-08 NOTE — XMS
Encounter Summary
  Created on: 2020
 
 Viviana Ricci
 External Reference #: [id number]
 : 47
 Sex: Female
 
 Demographics
 
 
+-----------------------+----------------------+
| Address               | [address]      |
|                       | [place]  [address] |
+-----------------------+----------------------+
| Home Phone            | [phone]      |
+-----------------------+----------------------+
| Preferred Language    | Unknown              |
+-----------------------+----------------------+
| Marital Status        | Single               |
+-----------------------+----------------------+
| Christianity Affiliation | 1009                 |
+-----------------------+----------------------+
| Race                  | Unknown              |
+-----------------------+----------------------+
| Ethnic Group          | Unknown              |
+-----------------------+----------------------+
 
 
 Author
 
 
+--------------+--------------------------------------------+
| Author       | [facility]  |
|              | and [place]                                |
+--------------+--------------------------------------------+
| Organization | [facility]  |
|              | and [place]                                |
+--------------+--------------------------------------------+
| Address      | Unknown                                    |
+--------------+--------------------------------------------+
| Phone        | Unavailable                                |
+--------------+--------------------------------------------+
 
 
 
 Support
 
 
+----------------+--------------+---------------------+-----------------+
| Name           | Relationship | Address             | Phone           |
+----------------+--------------+---------------------+-----------------+
| Ada/Ed Radhames | ECON         | [address]              | [phone] |
|                |              | [place]  |                 |
|                |              |  [address]              |                 |
+----------------+--------------+---------------------+-----------------+
 
 
 
 
 Care Team Providers
 
 
+------------------------+------+-----------------+
| Care Team Member Name  | Role | Phone           |
+------------------------+------+-----------------+
| Marzena Moser DO | PCP  | [phone] |
+------------------------+------+-----------------+
 
 
 
 Reason for Referral
 Pulmonary Rehab (Routine)
 
+--------+--------------+---------------+--------------+--------------+---------------+
| Status | Reason       | Specialty     | Diagnoses /  | Referred By  | Referred To   |
|        |              |               | Procedures   | Contact      | Contact       |
+--------+--------------+---------------+--------------+--------------+---------------+
| Closed |   Specialty  | Pulmonary     |   Diagnoses  |   [place], |   [facility] |
|        | Services     | Disease /     |  Pulmonary   |  MD Popeye  |               |
|        | Required     | Cardiac       | hypertension |  [address]    | Rehabilitatio |
|        |              | Rehabilitatio |  ([facility]       | [address]   | n  [address]      |
|        |              | n             | Shortness of | [place], [address]  [place] |
|        |              |               |  breath      |  [place] [address]    |  [place]    |
|        |              |               | Procedures   | Phone:       | [phone]    |
|        |              |               | PULM REHAB   | [phone] | Phone:        |
|        |              |               |              |   Fax:       | [phone]  |
|        |              |               |              | [phone] |  Fax:         |
|        |              |               |              |              | [phone]  |
+--------+--------------+---------------+--------------+--------------+---------------+
 
 
 
 
 Encounter Details
 
 
+--------+-------------+----------------------+----------------------+---------------------+
| Date   | Type        | Department           | Care Team            | Description         |
+--------+-------------+----------------------+----------------------+---------------------+
| / | Orders Only |   [facility] [place]          |   Popeye Townsend, | Pulmonary           |
| 2019   |             | CARDIOLOGY  [address]    |  MD  [address] | hypertension (HCC)  |
|        |             | [address]  [place], |  [address]  [place],   | (Primary Dx);       |
|        |             |  [place] [address]       | [place] [address]             | Shortness of breath |
|        |             | [phone]         | [phone]         |                     |
|        |             |                      | [phone] (Fax)   |                     |
+--------+-------------+----------------------+----------------------+---------------------+
 
 
 
 Social History
 
 
+--------------+-------+-----------+--------+------+
| Tobacco Use  | Types | Packs/Day | Years  | Date |
|              |       |           | Used   |      |
+--------------+-------+-----------+--------+------+
| Never Smoker |       |           |        |      |
+--------------+-------+-----------+--------+------+
 
 
 
 
+---------------------+---+---+---+
| Smokeless Tobacco:  |   |   |   |
| Never Used          |   |   |   |
+---------------------+---+---+---+
 
 
 
+---------------------------------------------------------------+
| Comments: some second hand smoke exposure, but fairly minimal |
+---------------------------------------------------------------+
 
 
 
+-------------+-------------+---------+----------+
| Alcohol Use | Drinks/Week | oz/Week | Comments |
+-------------+-------------+---------+----------+
| No          |             |         |          |
+-------------+-------------+---------+----------+
 
 
 
+------------------+---------------+
| Sex Assigned at  | Date Recorded |
| Birth            |               |
+------------------+---------------+
| Not on file      |               |
+------------------+---------------+
 
 
 
+----------------+-------------+-------------+
| Job Start Date | Occupation  | Industry    |
+----------------+-------------+-------------+
| Not on file    | Not on file | Not on file |
+----------------+-------------+-------------+
 
 
 
+----------------+--------------+------------+
| Travel History | Travel Start | Travel End |
+----------------+--------------+------------+
 
 
 
+-------------------------------------+
| No recent travel history available. |
+-------------------------------------+
 documented as of this encounter
 
 Plan of Treatment
 
 
+--------+-----------+------------+----------------------+-------------------+
| Date   | Type      | Specialty  | Care Team            | Description       |
+--------+-----------+------------+----------------------+-------------------+
| / | Office    | Cardiology |   Tonia Wilson,    |                   |
|    | Visit     |            | ARNP  [address]   |                   |
 
|        |           |            | St  [place]  |                   |
|        |           |            | [address]  [phone]  |                   |
|        |           |            |  [phone] (Fax)  |                   |
+--------+-----------+------------+----------------------+-------------------+
| / | Hospital  | Radiology  |   Popeye Townsend, |                   |
2020   | Encounter |            |  MD  [address] |                   |
|        |           |            |  [place]  [place],   |                   |
|        |           |            | [place] [address]             |                   |
|        |           |            | [phone]         |                   |
|        |           |            | [phone] (Fax)   |                   |
+--------+-----------+------------+----------------------+-------------------+
| / | Surgery   | Radiology  |   Popeye Townsend, | CV EP PPM SYSTEM  |
|    |           |            |  MD  [address] | IMPLANT           |
|        |           |            |  [place],   |                   |
|        |           |            | [place] [address]             |                   |
|        |           |            | [phone]         |                   |
|        |           |            | [phone] (Fax)   |                   |
+--------+-----------+------------+----------------------+-------------------+
| 02/10/ | Clinical  | Cardiology |                      |                   |
|    | Support   |            |                      |                   |
+--------+-----------+------------+----------------------+-------------------+
| / | Office    | Cardiology |   Tonia Wilson,    |                   |
|    | Visit     |            | [facility]  [address]   |                   |
|        |           |            | St  [place]  |                   |
|        |           |            | [address]  [phone]  |                   |
|        |           |            |  [phone] (Fax)  |                   |
+--------+-----------+------------+----------------------+-------------------+
| / | Off-Site  | Nephrology |   Marzena Moser  |                   |
|    | Visit     |            | DO [place]TIENNE  [address]      |                   |
|        |           |            | [address]      |                   |
|        |           |            | [place], [place]      |                   |
|        |           |            | [address]  [phone]  |                   |
|        |           |            |  [phone] (Fax)  |                   |
+--------+-----------+------------+----------------------+-------------------+
 
 
 
+----------------------+-------------+--------+----------------------+---------------------+
| Name                 | Type        | Priori | Associated Diagnoses | Order Schedule      |
|                      |             | ty     |                      |                     |
+----------------------+-------------+--------+----------------------+---------------------+
| Ambulatory Referral  | Outpatient  | Routin |   Pulmonary          | Ordered: 2019 |
| to Pulmonary Rehab   | Referral    | e      | hypertension (HCC)   |                     |
|                      |             |        | Shortness of breath  |                     |
+----------------------+-------------+--------+----------------------+---------------------+
 documented as of this encounter
 
 Visit Diagnoses
 
 
+----------------------------------------------------------------------------------+
| Diagnosis                                                                        |
+----------------------------------------------------------------------------------+
|   Pulmonary hypertension (HCC) - Primary  Other chronic pulmonary heart diseases |
+----------------------------------------------------------------------------------+
|   Shortness of breath                                                            |
+----------------------------------------------------------------------------------+
 documented in this encounter

## 2020-01-08 NOTE — XMS
Encounter Summary
  Created on: 2020
 
 Viviana Ricci
 External Reference #: [id number]
 : 47
 Sex: Female
 
 Demographics
 
 
+-----------------------+----------------------+
| Address               | [address]      |
|                       | [place]  [address] |
+-----------------------+----------------------+
| Home Phone            | [phone]      |
+-----------------------+----------------------+
| Preferred Language    | Unknown              |
+-----------------------+----------------------+
| Marital Status        | Single               |
+-----------------------+----------------------+
| Yazidi Affiliation | [id number]                 |
+-----------------------+----------------------+
| Race                  | Unknown              |
+-----------------------+----------------------+
| Ethnic Group          | Unknown              |
+-----------------------+----------------------+
 
 
 Author
 
 
+--------------+--------------------------------------------+
| Author       | [facility]  |
|              | and [facility]                                |
+--------------+--------------------------------------------+
| Organization | [facility]  |
|              | and [facility]                                |
+--------------+--------------------------------------------+
| Address      | Unknown                                    |
+--------------+--------------------------------------------+
| Phone        | Unavailable                                |
+--------------+--------------------------------------------+
 
 
 
 Support
 
 
+----------------+--------------+---------------------+-----------------+
| Name           | Relationship | Address             | Phone           |
+----------------+--------------+---------------------+-----------------+
| Ada/Ed Radhames | ECON         | [address]              | [phone] |
|                |              | [place]  |                 |
|                |              |  [address]              |                 |
+----------------+--------------+---------------------+-----------------+
 
 
 
 
 Care Team Providers
 
 
+-----------------------+------+-------------+
| Care Team Member Name | Role | Phone       |
+-----------------------+------+-------------+
 PCP  | Unavailable |
+-----------------------+------+-------------+
 
 
 
 Encounter Details
 
 
+--------+----------+---------------------+----------------------+-------------+
| Date   | Type     | Department          | Care Team            | Description |
+--------+----------+---------------------+----------------------+-------------+
| / | Abstract |   [facility]  [place]         |   Marzena Moser  |             |
|    |          | NEPHROLOGY  [address]   | M, DO  [address]      |             |
|        |          | [address]   | [address]      |             |
|        |          | [place], [place]     | [place]      |             |
|        |          | [address]          | [address]  [phone]  |             |
|        |          | [phone]        |  [phone] (Fax)  |             |
+--------+----------+---------------------+----------------------+-------------+
 
 
 
 Social History
 
 
+--------------+-------+-----------+--------+------+
| Tobacco Use  | Types | Packs/Day | Years  | Date |
|              |       |           | Used   |      |
+--------------+-------+-----------+--------+------+
| Never Smoker |       |           |        |      |
+--------------+-------+-----------+--------+------+
 
 
 
+---------------------+---+---+---+
| Smokeless Tobacco:  |   |   |   |
| Never Used          |   |   |   |
+---------------------+---+---+---+
 
 
 
+---------------------------------------------------------------+
| Comments: some second hand smoke exposure, but fairly minimal |
+---------------------------------------------------------------+
 
 
 
+-------------+-------------+---------+----------+
| Alcohol Use | Drinks/Week | oz/Week | Comments |
+-------------+-------------+---------+----------+
| No          |             |         |          |
+-------------+-------------+---------+----------+
 
 
 
 
+------------------+---------------+
| Sex Assigned at  | Date Recorded |
| Birth            |               |
+------------------+---------------+
| Not on file      |               |
+------------------+---------------+
 
 
 
+----------------+-------------+-------------+
| Job Start Date | Occupation  | Industry    |
+----------------+-------------+-------------+
| Not on file    | Not on file | Not on file |
+----------------+-------------+-------------+
 
 
 
+----------------+--------------+------------+
| Travel History | Travel Start | Travel End |
+----------------+--------------+------------+
 
 
 
+-------------------------------------+
| No recent travel history available. |
+-------------------------------------+
 documented as of this encounter
 
 Plan of Treatment
 
 
+--------+-----------+------------+----------------------+-------------------+
| Date   | Type      | Specialty  | Care Team            | Description       |
+--------+-----------+------------+----------------------+-------------------+
| / | Office    | Cardiology |   Tonia Wilson,    |                   |
|    | Visit     |            | ARNJESSICA  [address]   |                   |
|        |           |            | [place]  |                   |
|        |           |            | [address]  [phone]  |                   |
|        |           |            |  [phone] (Fax)  |                   |
+--------+-----------+------------+----------------------+-------------------+
| / | Hospital  | Radiology  |   Popeye Townsend, |                   |
|    | Encounter |            |  MD  [address] |                   |
|        |           |            |  St.  [place],   |                   |
|        |           |            | [place] [address]             |                   |
|        |           |            | [phone]         |                   |
|        |           |            | [phone] (Fax)   |                   |
+--------+-----------+------------+----------------------+-------------------+
| / | Surgery   | Radiology  |   Popeye Townsend, | CV EP PPM SYSTEM  |
|    |           |            |  MD  [address] | IMPLANT           |
|        |           |            |  St.  [place],   |                   |
|        |           |            | [place] [address]             |                   |
|        |           |            | [phone]         |                   |
|        |           |            | [phone] (Fax)   |                   |
+--------+-----------+------------+----------------------+-------------------+
| 02/10/ | Clinical  | Cardiology |                      |                   |
|    | Support   |            |                      |                   |
+--------+-----------+------------+----------------------+-------------------+
| / | Office    | Cardiology |   Tonia Wilson,    |                   |
|    | Visit     |            | ARNP  [address]   |                   |
 
|        |           |            | St  [place], [place]  |                   |
|        |           |            | [address]  [phone]  |                   |
|        |           |            |  [phone] (Fax)  |                   |
+--------+-----------+------------+----------------------+-------------------+
| / | Off-Site  | Nephrology |   Marzena Moser  |                   |
|    | Visit     |            | DO ETIENNE  [address] [place] [address]      |                   |
|        |           |            | [address]      |                   |
|        |           |            | [place]      |                   |
|        |           |            | [address]  [phone]  |                   |
|        |           |            |  [phone] (Fax)  |                   |
+--------+-----------+------------+----------------------+-------------------+
 documented as of this encounter
 
 Procedures
 
 
+--------------------+--------+------------+----------------------+----------------------+
| Procedure Name     | Priori | Date/Time  | Associated Diagnosis | Comments             |
|                    | ty     |            |                      |                      |
+--------------------+--------+------------+----------------------+----------------------+
| EXTERNAL LAB: BILLY  | Routin | 2014 |                      |   Results for this   |
|                    | e      |            |                      | procedure are in the |
|                    |        |            |                      |  results section.    |
+--------------------+--------+------------+----------------------+----------------------+
| EXTERNAL LAB: AST  | Routin | 2014 |                      |   Results for this   |
|                    | e      |            |                      | procedure are in the |
|                    |        |            |                      |  results section.    |
+--------------------+--------+------------+----------------------+----------------------+
| EXTERNAL LAB: ALT  | Routin | 2014 |                      |   Results for this   |
|                    | e      |            |                      | procedure are in the |
|                    |        |            |                      |  results section.    |
+--------------------+--------+------------+----------------------+----------------------+
| EXTERNAL LAB:      | Routin | 2014 |                      |   Results for this   |
| TRIGLYCERIDES      | e      |            |                      | procedure are in the |
|                    |        |            |                      |  results section.    |
+--------------------+--------+------------+----------------------+----------------------+
| EXTERNAL LAB:      | Routin | 2014 |                      |   Results for this   |
| CHOLESTEROL, HDL   | e      |            |                      | procedure are in the |
|                    |        |            |                      |  results section.    |
+--------------------+--------+------------+----------------------+----------------------+
| EXTERNAL LAB:      | Routin | 2014 |                      |   Results for this   |
| CHOLESTEROL, TOTAL | e      |            |                      | procedure are in the |
|                    |        |            |                      |  results section.    |
+--------------------+--------+------------+----------------------+----------------------+
| EXTERNAL LAB:      | Routin | 2014 |                      |   Results for this   |
| CHOLESTEROL, LDL   | e      |            |                      | procedure are in the |
|                    |        |            |                      |  results section.    |
+--------------------+--------+------------+----------------------+----------------------+
| EXTERNAL LAB: EGFR | Routin | 2014 |                      |   Results for this   |
|                    | e      |            |                      | procedure are in the |
|                    |        |            |                      |  results section.    |
+--------------------+--------+------------+----------------------+----------------------+
| EXTERNAL LAB:      | Routin | 2014 |                      |   Results for this   |
| CREATININE         | e      |            |                      | procedure are in the |
|                    |        |            |                      |  results section.    |
+--------------------+--------+------------+----------------------+----------------------+
| EXTERNAL LAB:      | Routin | 2014 |                      |   Results for this   |
| HEMOGLOBIN A1C     | e      |            |                      | procedure are in the |
|                    |        |            |                      |  results section.    |
+--------------------+--------+------------+----------------------+----------------------+
 
| CMPI               | Routin | 2014 |                      |   Results for this   |
|                    | e      |            |                      | procedure are in the |
|                    |        |            |                      |  results section.    |
+--------------------+--------+------------+----------------------+----------------------+
 documented in this encounter
 
 Results
 CMP/ISTAT (2014)
 
+-------------+-------+-----------------+------------+--------------+
| Component   | Value | Ref Range       | Performed  | Pathologist  |
|             |       |                 | At         | Signature    |
+-------------+-------+-----------------+------------+--------------+
| Na          | 141   | mmol/L          |            |              |
+-------------+-------+-----------------+------------+--------------+
| K           | 5.1   | mmol/L          |            |              |
+-------------+-------+-----------------+------------+--------------+
| Cl          | 109   | mmol/L          |            |              |
+-------------+-------+-----------------+------------+--------------+
| CO2         | 22    | mmol/L          |            |              |
+-------------+-------+-----------------+------------+--------------+
| BUN         | 46    | mg/dL           |            |              |
+-------------+-------+-----------------+------------+--------------+
| Glucose     | 133   | mg/dL           |            |              |
+-------------+-------+-----------------+------------+--------------+
| Calcium     | 9.7   | mg/dL           |            |              |
+-------------+-------+-----------------+------------+--------------+
| Phosphorus  | 3.3   | 2.6 - 4.4 mg/dL |            |              |
+-------------+-------+-----------------+------------+--------------+
| Magnesium   | 1.8   | mg/dL           |            |              |
+-------------+-------+-----------------+------------+--------------+
| MCV         | 102.4 | fL              |            |              |
+-------------+-------+-----------------+------------+--------------+
| Bilirubin   | 0.4   | mg/dL           |            |              |
| Total       |       |                 |            |              |
+-------------+-------+-----------------+------------+--------------+
| Alkaline    | 95    | U/L             |            |              |
| Phosphatase |       |                 |            |              |
+-------------+-------+-----------------+------------+--------------+
| Albumin     | 3.7   | 3.3 - 4.8 g/dL  |            |              |
+-------------+-------+-----------------+------------+--------------+
| Tacrolimus  | 5.2   |                 |            |              |
| Level       |       |                 |            |              |
+-------------+-------+-----------------+------------+--------------+
 
 
 
+-----------------+
| Specimen        |
+-----------------+
| Blood specimen  |
| (specimen)      |
+-----------------+
 External Lab: CBC (2014)
 
+-----------+-------+-----------+------------+--------------+
| Component | Value | Ref Range | Performed  | Pathologist  |
|           |       |           | At         | Signature    |
+-----------+-------+-----------+------------+--------------+
| WBC,      | 5.9   | 4 - 11    | EXTERNAL   |              |
 
| External  |       |           | LAB        |              |
+-----------+-------+-----------+------------+--------------+
| HGB,      | 12.6  | 12 - 16   | EXTERNAL   |              |
| External  |       |           | LAB        |              |
+-----------+-------+-----------+------------+--------------+
| HCT,      | 39.2  | 35 - 45   | EXTERNAL   |              |
| External  |       |           | LAB        |              |
+-----------+-------+-----------+------------+--------------+
| PLT,      | 175   | 140 - 440 | EXTERNAL   |              |
| External  |       |           | LAB        |              |
+-----------+-------+-----------+------------+--------------+
 
 
 
+--------------------------+
| Resulting Agency Comment |
+--------------------------+
|   [facility]              |
+--------------------------+
 
 
 
+----------------+---------+--------------------+--------------+
| Performing     | Address | City/State/Zipcode | Phone Number |
| Organization   |         |                    |              |
+----------------+---------+--------------------+--------------+
|   EXTERNAL LAB |         |                    |              |
+----------------+---------+--------------------+--------------+
 External Lab: AST (2014)
 
+-----------+-------+-----------+------------+--------------+
| Component | Value | Ref Range | Performed  | Pathologist  |
|           |       |           | At         | Signature    |
+-----------+-------+-----------+------------+--------------+
| AST,      | 24    | 0 - 40    | EXTERNAL   |              |
| External  |       |           | LAB        |              |
+-----------+-------+-----------+------------+--------------+
 
 
 
+-----------------+
| Specimen        |
+-----------------+
| Blood specimen  |
| (specimen)      |
+-----------------+
 
 
 
+--------------------------+
| Resulting Agency Comment |
+--------------------------+
|   [facility]              |
+--------------------------+
 
 
 
+----------------+---------+--------------------+--------------+
| Performing     | Address | City/State/Zipcode | Phone Number |
| Organization   |         |                    |              |
 
+----------------+---------+--------------------+--------------+
|   EXTERNAL LAB |         |                    |              |
+----------------+---------+--------------------+--------------+
 External Lab: ALT (2014)
 
+-----------+-------+-----------+------------+--------------+
| Component | Value | Ref Range | Performed  | Pathologist  |
|           |       |           | At         | Signature    |
+-----------+-------+-----------+------------+--------------+
| ALT,      | 17    | 0 - 40    | EXTERNAL   |              |
| External  |       |           | LAB        |              |
+-----------+-------+-----------+------------+--------------+
 
 
 
+-----------------+
| Specimen        |
+-----------------+
| Blood specimen  |
| (specimen)      |
+-----------------+
 
 
 
+--------------------------+
| Resulting Agency Comment |
+--------------------------+
|   [facility]              |
+--------------------------+
 
 
 
+----------------+---------+--------------------+--------------+
| Performing     | Address | City/State/Zipcode | Phone Number |
| Organization   |         |                    |              |
+----------------+---------+--------------------+--------------+
|   EXTERNAL LAB |         |                    |              |
+----------------+---------+--------------------+--------------+
 External Lab: Triglycerides (2014)
 
+-------------+---------+-----------+------------+--------------+
| Component   | Value   | Ref Range | Performed  | Pathologist  |
|             |         |           | At         | Signature    |
+-------------+---------+-----------+------------+--------------+
| Triglycerid | 186 (A) | 150       | EXTERNAL   |              |
| es,         |         |           | LAB        |              |
| External    |         |           |            |              |
+-------------+---------+-----------+------------+--------------+
 
 
 
+-----------------+
| Specimen        |
+-----------------+
| Blood specimen  |
| (specimen)      |
+-----------------+
 
 
 
 
+--------------------------+
| Resulting Agency Comment |
+--------------------------+
|   [facility]              |
+--------------------------+
 
 
 
+----------------+---------+--------------------+--------------+
| Performing     | Address | City/State/Zipcode | Phone Number |
| Organization   |         |                    |              |
+----------------+---------+--------------------+--------------+
|   EXTERNAL LAB |         |                    |              |
+----------------+---------+--------------------+--------------+
 External Lab: Cholesterol, HDL (2014)
 
+-------------+-------+-----------+------------+--------------+
| Component   | Value | Ref Range | Performed  | Pathologist  |
|             |       |           | At         | Signature    |
+-------------+-------+-----------+------------+--------------+
| HDL         | 52.6  | 40        | EXTERNAL   |              |
| Cholesterol |       |           | LAB        |              |
| , External  |       |           |            |              |
+-------------+-------+-----------+------------+--------------+
 
 
 
+-----------------+
| Specimen        |
+-----------------+
| Blood specimen  |
| (specimen)      |
+-----------------+
 
 
 
+--------------------------+
| Resulting Agency Comment |
+--------------------------+
|   [facility]              |
+--------------------------+
 
 
 
+----------------+---------+--------------------+--------------+
| Performing     | Address | City/State/Zipcode | Phone Number |
| Organization   |         |                    |              |
+----------------+---------+--------------------+--------------+
|   EXTERNAL LAB |         |                    |              |
+----------------+---------+--------------------+--------------+
 External Lab: Cholesterol, Total (2014)
 
+-------------+-------+-----------+------------+--------------+
| Component   | Value | Ref Range | Performed  | Pathologist  |
|             |       |           | At         | Signature    |
+-------------+-------+-----------+------------+--------------+
| Cholesterol | 166   | 200       | EXTERNAL   |              |
| , Total,    |       |           | LAB        |              |
| External    |       |           |            |              |
+-------------+-------+-----------+------------+--------------+
 
 
 
 
+-----------------+
| Specimen        |
+-----------------+
| Blood specimen  |
| (specimen)      |
+-----------------+
 
 
 
+--------------------------+
| Resulting Agency Comment |
+--------------------------+
|   [facility]              |
+--------------------------+
 
 
 
+----------------+---------+--------------------+--------------+
| Performing     | Address | City/State/Zipcode | Phone Number |
| Organization   |         |                    |              |
+----------------+---------+--------------------+--------------+
|   EXTERNAL LAB |         |                    |              |
+----------------+---------+--------------------+--------------+
 External Lab: Cholesterol, LDL (2014)
 
+-------------+-------+-----------+------------+--------------+
| Component   | Value | Ref Range | Performed  | Pathologist  |
|             |       |           | At         | Signature    |
+-------------+-------+-----------+------------+--------------+
| LDL         | 76    | 100       | EXTERNAL   |              |
| Cholesterol |       |           | LAB        |              |
| , Direct,   |       |           |            |              |
| External    |       |           |            |              |
+-------------+-------+-----------+------------+--------------+
 
 
 
+-----------------+
| Specimen        |
+-----------------+
| Blood specimen  |
| (specimen)      |
+-----------------+
 
 
 
+--------------------------+
| Resulting Agency Comment |
+--------------------------+
|   [facility]              |
+--------------------------+
 
 
 
+----------------+---------+--------------------+--------------+
| Performing     | Address | City/State/Zipcode | Phone Number |
| Organization   |         |                    |              |
 
+----------------+---------+--------------------+--------------+
|   EXTERNAL LAB |         |                    |              |
+----------------+---------+--------------------+--------------+
 External Lab: eGFR (2014)
 
+------------+--------+-----------+------------+--------------+
| Component  | Value  | Ref Range | Performed  | Pathologist  |
|            |        |           | At         | Signature    |
+------------+--------+-----------+------------+--------------+
| eGFR,      | 31 (A) | 60        | EXTERNAL   |              |
| External   |        |           | LAB        |              |
+------------+--------+-----------+------------+--------------+
| eGFR,      |        |           | EXTERNAL   |              |
|     |        |           | LAB        |              |
| [facility],  |        |           |            |              |
| External   |        |           |            |              |
+------------+--------+-----------+------------+--------------+
 
 
 
+-----------------+
| Specimen        |
+-----------------+
| Blood specimen  |
| (specimen)      |
+-----------------+
 
 
 
+--------------------------+
| Resulting Agency Comment |
+--------------------------+
|   [facility]              |
+--------------------------+
 
 
 
+----------------+---------+--------------------+--------------+
| Performing     | Address | City/State/Zipcode | Phone Number |
| Organization   |         |                    |              |
+----------------+---------+--------------------+--------------+
|   EXTERNAL LAB |         |                    |              |
+----------------+---------+--------------------+--------------+
 External Lab: Creatinine (2014)
 
+-------------+----------+-----------+------------+--------------+
| Component   | Value    | Ref Range | Performed  | Pathologist  |
|             |          |           | At         | Signature    |
+-------------+----------+-----------+------------+--------------+
| Creatinine, | 1.66 (A) | 0.6 - 1.3 | EXTERNAL   |              |
|  External   |          |           | LAB        |              |
+-------------+----------+-----------+------------+--------------+
 
 
 
+-----------------+
| Specimen        |
+-----------------+
| Blood specimen  |
| (specimen)      |
 
+-----------------+
 
 
 
+--------------------------+
| Resulting Agency Comment |
+--------------------------+
|   [facility]              |
+--------------------------+
 
 
 
+----------------+---------+--------------------+--------------+
| Performing     | Address | City/State/Zipcode | Phone Number |
| Organization   |         |                    |              |
+----------------+---------+--------------------+--------------+
|   EXTERNAL LAB |         |                    |              |
+----------------+---------+--------------------+--------------+
 External Lab: Hemoglobin A1c (2014)
 
+-------------+---------+-----------+------------+--------------+
| Component   | Value   | Ref Range | Performed  | Pathologist  |
|             |         |           | At         | Signature    |
+-------------+---------+-----------+------------+--------------+
| Hemoglobin  | 7.3 (A) | 6.4       | EXTERNAL   |              |
| A1c,        |         |           | LAB        |              |
| external    |         |           |            |              |
+-------------+---------+-----------+------------+--------------+
 
 
 
+-----------------+
| Specimen        |
+-----------------+
| Blood specimen  |
| (specimen)      |
+-----------------+
 
 
 
+--------------------------+
| Resulting Agency Comment |
+--------------------------+
|   [facility]              |
+--------------------------+
 
 
 
+----------------+---------+--------------------+--------------+
| Performing     | Address | City/State/Zipcode | Phone Number |
| Organization   |         |                    |              |
+----------------+---------+--------------------+--------------+
|   EXTERNAL LAB |         |                    |              |
+----------------+---------+--------------------+--------------+
 documented in this encounter
 
 Visit Diagnoses
 Not on filedocumented in this encounter

## 2020-01-08 NOTE — XMS
Encounter Summary
  Created on: 2020
 
 Viviana Ricci
 External Reference #: [id number]
 : 47
 Sex: Female
 
 Demographics
 
 
+-----------------------+----------------------+
| Address               | [address]      |
|                       | [place]  [address] |
+-----------------------+----------------------+
| Home Phone            | [phone]      |
+-----------------------+----------------------+
| Preferred Language    | Unknown              |
+-----------------------+----------------------+
| Marital Status        | Single               |
+-----------------------+----------------------+
| Islam Affiliation | [id number]                 |
+-----------------------+----------------------+
| Race                  | Unknown              |
+-----------------------+----------------------+
| Ethnic Group          | Unknown              |
+-----------------------+----------------------+
 
 
 Author
 
 
+--------------+--------------------------------------------+
| Author       | [facility]  |
|              | and [facility]                                |
+--------------+--------------------------------------------+
| Organization | [facility]  |
|              | and [facility]                                |
+--------------+--------------------------------------------+
| Address      | Unknown                                    |
+--------------+--------------------------------------------+
| Phone        | Unavailable                                |
+--------------+--------------------------------------------+
 
 
 
 Support
 
 
+----------------+--------------+---------------------+-----------------+
| Name           | Relationship | Address             | Phone           |
+----------------+--------------+---------------------+-----------------+
| Ada/Ed Radhames | ECON         | [place]              | [phone] |
|                |              | [place]  |                 |
|                |              |  [address]              |                 |
+----------------+--------------+---------------------+-----------------+
 
 
 
 
 Care Team Providers
 
 
+-----------------------+------+-------------+
| Care Team Member Name | Role | Phone       |
+-----------------------+------+-------------+
 PCP  | Unavailable |
+-----------------------+------+-------------+
 
 
 
 Reason for Visit
 
 
+-------------------+----------+
| Reason            | Comments |
+-------------------+----------+
| Medication Refill |          |
+-------------------+----------+
 
 
 
 Encounter Details
 
 
+--------+--------+---------------------+----------------------+-------------------+
| Date   | Type   | Department          | Care Team            | Description       |
+--------+--------+---------------------+----------------------+-------------------+
| / | Refill |   [facility]         |   Marzena Moser  | Medication Refill |
|    |        | NEPHROLOGY  [address]   | M, DO  [address]      |                   |
|        |        | [address]   | [address]      |                   |
|        |        | [place], [place]     | [place], [place]      |                   |
|        |        | [address]          | [address]  [phone]  |                   |
|        |        | [phone]        |  [phone] (Fax)  |                   |
+--------+--------+---------------------+----------------------+-------------------+
 
 
 
 Social History
 
 
+--------------+-------+-----------+--------+------+
| Tobacco Use  | Types | Packs/Day | Years  | Date |
|              |       |           | Used   |      |
+--------------+-------+-----------+--------+------+
| Never Smoker |       |           |        |      |
+--------------+-------+-----------+--------+------+
 
 
 
+---------------------+---+---+---+
| Smokeless Tobacco:  |   |   |   |
| Never Used          |   |   |   |
+---------------------+---+---+---+
 
 
 
+---------------------------------------------------------------+
| Comments: some second hand smoke exposure, but fairly minimal |
 
+---------------------------------------------------------------+
 
 
 
+-------------+-------------+---------+----------+
| Alcohol Use | Drinks/Week | oz/Week | Comments |
+-------------+-------------+---------+----------+
| No          |             |         |          |
+-------------+-------------+---------+----------+
 
 
 
+------------------+---------------+
| Sex Assigned at  | Date Recorded |
| Birth            |               |
+------------------+---------------+
| Not on file      |               |
+------------------+---------------+
 
 
 
+----------------+-------------+-------------+
| Job Start Date | Occupation  | Industry    |
+----------------+-------------+-------------+
| Not on file    | Not on file | Not on file |
+----------------+-------------+-------------+
 
 
 
+----------------+--------------+------------+
| Travel History | Travel Start | Travel End |
+----------------+--------------+------------+
 
 
 
+-------------------------------------+
| No recent travel history available. |
+-------------------------------------+
 documented as of this encounter
 
 Plan of Treatment
 
 
+--------+-----------+------------+----------------------+-------------------+
| Date   | Type      | Specialty  | Care Team            | Description       |
+--------+-----------+------------+----------------------+-------------------+
| / | Office    | Cardiology |   HellalfredoTonia,    |                   |
|    | Visit     |            | ARNP  [address]   |                   |
|        |           |            | St  [place], [place]  |                   |
|        |           |            | [address]  [phone]  |                   |
|        |           |            |  [phone] (Fax)  |                   |
+--------+-----------+------------+----------------------+-------------------+
| / | Hospital  | Radiology  |   Popeye Townsend, |                   |
|    | Encounter |            |  MD  [address] |                   |
|        |           |            |  St.  [place],   |                   |
|        |           |            | [place] [address]             |                   |
|        |           |            | [phone]         |                   |
|        |           |            | [phone] (Fax)   |                   |
+--------+-----------+------------+----------------------+-------------------+
| / | Surgery   | Radiology  |   Popeye Townsend, | CV EP PPM SYSTEM  |
 
|    |           |            |  MD  [address] | IMPLANT           |
|        |           |            |  St.  [place],   |                   |
|        |           |            | [place] [address]             |                   |
|        |           |            | [phone]         |                   |
|        |           |            | [phone] (Fax)   |                   |
+--------+-----------+------------+----------------------+-------------------+
| 02/10/ | Clinical  | Cardiology |                      |                   |
|    | Support   |            |                      |                   |
+--------+-----------+------------+----------------------+-------------------+
| / | Office    | Cardiology |   Tonia Wilson,    |                   |
|    | Visit     |            | ARNP  [address]   |                   |
|        |           |            | [place]  |                   |
|        |           |            | [address]  [phone]  |                   |
|        |           |            |  [phone] (Fax)  |                   |
+--------+-----------+------------+----------------------+-------------------+
| / | Off-Site  | Nephrology |   Marzena Moser  |                   |
|    | Visit     |            | DO ETIENNE  [address] [place] [address]      |                   |
|        |           |            | [address]      |                   |
|        |           |            | [place]      |                   |
|        |           |            | [address]  [phone]  |                   |
|        |           |            |  [phone] (Fax)  |                   |
+--------+-----------+------------+----------------------+-------------------+
 documented as of this encounter
 
 Visit Diagnoses
 Not on filedocumented in this encounter

## 2020-01-08 NOTE — XMS
Encounter Summary
  Created on: 2020
 
 Viviana Ricci
 External Reference #: [id number]
 : 47
 Sex: Female
 
 Demographics
 
 
+-----------------------+----------------------+
| Address               | [address]      |
|                       | [place]  [address] |
+-----------------------+----------------------+
| Home Phone            | [phone]      |
+-----------------------+----------------------+
| Preferred Language    | Unknown              |
+-----------------------+----------------------+
| Marital Status        | Single               |
+-----------------------+----------------------+
| Christian Affiliation | [id number]                 |
+-----------------------+----------------------+
| Race                  | Unknown              |
+-----------------------+----------------------+
| Ethnic Group          | Unknown              |
+-----------------------+----------------------+
 
 
 Author
 
 
+--------------+--------------------------------------------+
| Author       | formerly [facility]  |
|              | and [place]                                |
+--------------+--------------------------------------------+
| Organization | formerly [facility]  |
|              | and [place]                                |
+--------------+--------------------------------------------+
| Address      | Unknown                                    |
+--------------+--------------------------------------------+
| Phone        | Unavailable                                |
+--------------+--------------------------------------------+
 
 
 
 Support
 
 
+----------------+--------------+---------------------+-----------------+
| Name           | Relationship | Address             | Phone           |
+----------------+--------------+---------------------+-----------------+
| Ada/Ed Radhames | ECON         | [address]              | [phone] |
|                |              | [place]  |                 |
|                |              |  [id number]              |                 |
+----------------+--------------+---------------------+-----------------+
 
 
 
 
 Care Team Providers
 
 
+------------------------+------+-----------------+
| Care Team Member Name  | Role | Phone           |
+------------------------+------+-----------------+
| Marzena Moser DO | PCP  | [phone] |
+------------------------+------+-----------------+
 
 
 
 Encounter Details
 
 
+--------+-------------+---------------------+----------------------+----------------------+
| Date   | Type        | Department          | Care Team            | Description          |
+--------+-------------+---------------------+----------------------+----------------------+
| / | Orders Only |   [facility]         |   Marzena Moser  | Kidney replaced by   |
| 2019   |             | NEPHROLOGY  [address]   | M, DO  [address]      | transplant (Primary  |
|        |             | [address]   | [place]      | Dx); Mixed           |
|        |             | [place], [place]     | [place], [place]      | hyperlipidemia; Type |
|        |             | [address]          | [address]  [phone]  |  2 diabetes mellitus |
|        |             | [phone]        |  [phone] (Fax)  |  with chronic kidney |
|        |             |                     |                      |  disease, without    |
|        |             |                     |                      | long-term current    |
|        |             |                     |                      | use of insulin,      |
|        |             |                     |                      | unspecified CKD      |
|        |             |                     |                      | stage (HCC); Vitamin |
|        |             |                     |                      |  D deficiency        |
+--------+-------------+---------------------+----------------------+----------------------+
 
 
 
 Social History
 
 
+--------------+-------+-----------+--------+------+
| Tobacco Use  | Types | Packs/Day | Years  | Date |
|              |       |           | Used   |      |
+--------------+-------+-----------+--------+------+
| Never Smoker |       |           |        |      |
+--------------+-------+-----------+--------+------+
 
 
 
+---------------------+---+---+---+
| Smokeless Tobacco:  |   |   |   |
| Never Used          |   |   |   |
+---------------------+---+---+---+
 
 
 
+---------------------------------------------------------------+
| Comments: some second hand smoke exposure, but fairly minimal |
+---------------------------------------------------------------+
 
 
 
+-------------+-------------+---------+----------+
 
| Alcohol Use | Drinks/Week | oz/Week | Comments |
+-------------+-------------+---------+----------+
| No          |             |         |          |
+-------------+-------------+---------+----------+
 
 
 
+------------------+---------------+
| Sex Assigned at  | Date Recorded |
| Birth            |               |
+------------------+---------------+
| Not on file      |               |
+------------------+---------------+
 
 
 
+----------------+-------------+-------------+
| Job Start Date | Occupation  | Industry    |
+----------------+-------------+-------------+
| Not on file    | Not on file | Not on file |
+----------------+-------------+-------------+
 
 
 
+----------------+--------------+------------+
| Travel History | Travel Start | Travel End |
+----------------+--------------+------------+
 
 
 
+-------------------------------------+
| No recent travel history available. |
+-------------------------------------+
 documented as of this encounter
 
 Plan of Treatment
 
 
+--------+-----------+------------+----------------------+-------------------+
| Date   | Type      | Specialty  | Care Team            | Description       |
+--------+-----------+------------+----------------------+-------------------+
| / | Office    | Cardiology |   Tonia Wilson,    |                   |
|    | Visit     |            | ARNJESSICA  [address]   |                   |
|        |           |            | [place], [place]  |                   |
|        |           |            | [address]  [phone]  |                   |
|        |           |            |  [phone] (Fax)  |                   |
+--------+-----------+------------+----------------------+-------------------+
| / | Hospital  | Radiology  |   Popeye Townsend, |                   |
|    | Encounter |            |  MD  [address] [place] |                   |
|        |           |            |  [place],   |                   |
|        |           |            | [place] [address]             |                   |
|        |           |            | [phone]         |                   |
|        |           |            | [phone] (Fax)   |                   |
+--------+-----------+------------+----------------------+-------------------+
| / | Surgery   | Radiology  |   Popeye Townsend, | CV EP PPM SYSTEM  |
|    |           |            |  MD  [address] | IMPLANT           |
|        |           |            |  St.  [place],   |                   |
|        |           |            | [place] [address]             |                   |
|        |           |            | [phone]         |                   |
|        |           |            | [phone] (Fax)   |                   |
 
+--------+-----------+------------+----------------------+-------------------+
| 02/10/ | Clinical  | Cardiology |                      |                   |
|    | Support   |            |                      |                   |
+--------+-----------+------------+----------------------+-------------------+
| / | Office    | Cardiology |   RakeshmaxxalfredoTonia,    |                   |
|    | Visit     |            | [facility]  [address]   |                   |
|        |           |            | [place]  |                   |
|        |           |            | [address]  [phone]  |                   |
|        |           |            |  [phone] (Fax)  |                   |
+--------+-----------+------------+----------------------+-------------------+
| / | Off-Site  | Nephrology |   Marzena Moser  |                   |
|    | Visit     |            | DO ETIENNE  [address] [place] [address]      |                   |
|        |           |            | [address]      |                   |
|        |           |            | [place]      |                   |
|        |           |            | [address]  [phone]  |                   |
|        |           |            |  [phone] (Fax)  |                   |
+--------+-----------+------------+----------------------+-------------------+
 
 
 
+--------------------+------+--------+----------------------+----------------------+
| Name               | Type | Priori | Associated Diagnoses | Order Schedule       |
|                    |      | ty     |                      |                      |
+--------------------+------+--------+----------------------+----------------------+
| CBC with           | Lab  | Routin |   Kidney replaced by | Every 3 months for 4 |
| Differential       |      | e      |  transplant  Mixed   |  Occurrences         |
|                    |      |        | hyperlipidemia  Type | starting 2019  |
|                    |      |        |  2 diabetes mellitus | until 2020     |
|                    |      |        |  with chronic kidney |                      |
|                    |      |        |  disease, without    |                      |
|                    |      |        | long-term current    |                      |
|                    |      |        | use of insulin,      |                      |
|                    |      |        | unspecified CKD      |                      |
|                    |      |        | stage (HCC)          |                      |
+--------------------+------+--------+----------------------+----------------------+
| Hemoglobin A1C     | Lab  | Routin |   Kidney replaced by | EVery 3 months for 4 |
|                    |      | e      |  transplant  Mixed   |  Occurrences         |
|                    |      |        | hyperlipidemia  Type | starting 2019  |
|                    |      |        |  2 diabetes mellitus | until 2020     |
|                    |      |        |  with chronic kidney |                      |
|                    |      |        |  disease, without    |                      |
|                    |      |        | long-term current    |                      |
|                    |      |        | use of insulin,      |                      |
|                    |      |        | unspecified CKD      |                      |
|                    |      |        | stage (HCC)          |                      |
+--------------------+------+--------+----------------------+----------------------+
| Lipid Profile      | Lab  | Routin |   Kidney replaced by | Every 3 months for 4 |
|                    |      | e      |  transplant  Mixed   |  Occurrences         |
|                    |      |        | hyperlipidemia  Type | starting 2019  |
|                    |      |        |  2 diabetes mellitus | until 2020     |
|                    |      |        |  with chronic kidney |                      |
|                    |      |        |  disease, without    |                      |
|                    |      |        | long-term current    |                      |
|                    |      |        | use of insulin,      |                      |
|                    |      |        | unspecified CKD      |                      |
|                    |      |        | stage (HCC)          |                      |
+--------------------+------+--------+----------------------+----------------------+
| Vitamin D,         | Lab  | Routin |   Kidney replaced by | Every 3 months for 4 |
| Deficiency Screen  |      | e      |  transplant  Mixed   |  Occurrences         |
| (25-Hydroxy)       |      |        | hyperlipidemia  Type | starting 2019  |
 
|                    |      |        |  2 diabetes mellitus | until 2020     |
|                    |      |        |  with chronic kidney |                      |
|                    |      |        |  disease, without    |                      |
|                    |      |        | long-term current    |                      |
|                    |      |        | use of insulin,      |                      |
|                    |      |        | unspecified CKD      |                      |
|                    |      |        | stage (HCC)  Vitamin |                      |
|                    |      |        |  D deficiency        |                      |
+--------------------+------+--------+----------------------+----------------------+
| Cytomegalovirus,   | Lab  | Routin |   Kidney replaced by | Every 3 months for 4 |
| NAAT, Quant        |      | e      |  transplant  Mixed   |  Occurrences         |
|                    |      |        | hyperlipidemia  Type | starting 2019  |
|                    |      |        |  2 diabetes mellitus | until 2020     |
|                    |      |        |  with chronic kidney |                      |
|                    |      |        |  disease, without    |                      |
|                    |      |        | long-term current    |                      |
|                    |      |        | use of insulin,      |                      |
|                    |      |        | unspecified CKD      |                      |
|                    |      |        | stage (HCC)          |                      |
+--------------------+------+--------+----------------------+----------------------+
 documented as of this encounter
 
 Visit Diagnoses
 
 
+------------------------------------------------------------------------------------------+
| Diagnosis                                                                                |
+------------------------------------------------------------------------------------------+
|   Kidney replaced by transplant - Primary                                                |
+------------------------------------------------------------------------------------------+
|   Mixed hyperlipidemia                                                                   |
+------------------------------------------------------------------------------------------+
|   Type 2 diabetes mellitus with chronic kidney disease, without long-term current use of |
|  insulin, unspecified CKD stage (HCC)                                                    |
+------------------------------------------------------------------------------------------+
|   Vitamin D deficiency  Unspecified vitamin D deficiency                                 |
+------------------------------------------------------------------------------------------+
 documented in this encounter

## 2020-01-08 NOTE — XMS
Encounter Summary
  Created on: 2020
 
 Viviana Ricci
 External Reference #: [id number]
 : 47
 Sex: Female
 
 Demographics
 
 
+-----------------------+----------------------+
| Address               | [address]      |
|                       | [place]  [address] |
+-----------------------+----------------------+
| Home Phone            | [phone]      |
+-----------------------+----------------------+
| Preferred Language    | Unknown              |
+-----------------------+----------------------+
| Marital Status        | Single               |
+-----------------------+----------------------+
| Yarsanism Affiliation | [id number]                 |
+-----------------------+----------------------+
| Race                  | Unknown              |
+-----------------------+----------------------+
| Ethnic Group          | Unknown              |
+-----------------------+----------------------+
 
 
 Author
 
 
+--------------+--------------------------------------------+
| Author       | [facility]  |
|              | and [place]                                |
+--------------+--------------------------------------------+
| Organization | [facility]  |
|              | and [place]                                |
+--------------+--------------------------------------------+
| Address      | Unknown                                    |
+--------------+--------------------------------------------+
| Phone        | Unavailable                                |
+--------------+--------------------------------------------+
 
 
 
 Support
 
 
+----------------+--------------+---------------------+-----------------+
| Name           | Relationship | Address             | Phone           |
+----------------+--------------+---------------------+-----------------+
| Ada/Ed Radhames | ECON         | [address]              | [phone] |
|                |              | [place]  |                 |
|                |              |  [address]              |                 |
+----------------+--------------+---------------------+-----------------+
 
 
 
 
 Care Team Providers
 
 
+-----------------------+------+-------------+
| Care Team Member Name | Role | Phone       |
+-----------------------+------+-------------+
 PCP  | Unavailable |
+-----------------------+------+-------------+
 
 
 
 Encounter Details
 
 
+--------+----------+---------------------+----------------------+-------------+
| Date   | Type     | Department          | Care Team            | Description |
+--------+----------+---------------------+----------------------+-------------+
| / | Abstract |   [facility] [place]         |   Marezna Moser  |             |
|    |          | NEPHROLOGY  [address]   | M, DO  [address]      |             |
|        |          | [address]   | [address]      |             |
|        |          | [place], [place]     | [place]      |             |
|        |          | [address]          | [address]  [phone]  |             |
|        |          | [phone]        |  [phone] (Fax)  |             |
+--------+----------+---------------------+----------------------+-------------+
 
 
 
 Social History
 
 
+--------------+-------+-----------+--------+------+
| Tobacco Use  | Types | Packs/Day | Years  | Date |
|              |       |           | Used   |      |
+--------------+-------+-----------+--------+------+
| Never Smoker |       |           |        |      |
+--------------+-------+-----------+--------+------+
 
 
 
+---------------------+---+---+---+
| Smokeless Tobacco:  |   |   |   |
| Never Used          |   |   |   |
+---------------------+---+---+---+
 
 
 
+---------------------------------------------------------------+
| Comments: some second hand smoke exposure, but fairly minimal |
+---------------------------------------------------------------+
 
 
 
+-------------+-------------+---------+----------+
| Alcohol Use | Drinks/Week | oz/Week | Comments |
+-------------+-------------+---------+----------+
| No          |             |         |          |
+-------------+-------------+---------+----------+
 
 
 
 
+------------------+---------------+
| Sex Assigned at  | Date Recorded |
| Birth            |               |
+------------------+---------------+
| Not on file      |               |
+------------------+---------------+
 
 
 
+----------------+-------------+-------------+
| Job Start Date | Occupation  | Industry    |
+----------------+-------------+-------------+
| Not on file    | Not on file | Not on file |
+----------------+-------------+-------------+
 
 
 
+----------------+--------------+------------+
| Travel History | Travel Start | Travel End |
+----------------+--------------+------------+
 
 
 
+-------------------------------------+
| No recent travel history available. |
+-------------------------------------+
 documented as of this encounter
 
 Progress Maite Perez - 2017  8:40 AM PDTOutside record:  Diabetic patient eye examination
 report from [employer] [facility], dos:  17.  Sent to [facility].Electronically signed by 
Maite Raygoza at 2017  8:41 AM PDTdocumented in this encounter
 
 Plan of Treatment
 
 
+--------+-----------+------------+----------------------+-------------------+
| Date   | Type      | Specialty  | Care Team            | Description       |
+--------+-----------+------------+----------------------+-------------------+
| / | Office    | Cardiology |   Tonia Wilson,    |                   |
|    | Visit     |            | ARN  [address]   |                   |
|        |           |            | [facility] [place]  |                   |
|        |           |            | [address]  [phone]  |                   |
|        |           |            |  [phone] (Fax)  |                   |
+--------+-----------+------------+----------------------+-------------------+
| / | Hospital  | Radiology  |   Popeye Townsend, |                   |
|    | Encounter |            |  MD  [address] |                   |
|        |           |            |  St.  [place],   |                   |
|        |           |            | [place] [address]             |                   |
|        |           |            | [phone]         |                   |
|        |           |            | [phone] (Fax)   |                   |
+--------+-----------+------------+----------------------+-------------------+
| / | Surgery   | Radiology  |   Popeye Townsend, | CV EP PPM SYSTEM  |
|    |           |            |  MD  [address] | IMPLANT           |
|        |           |            |  St.  [place],   |                   |
|        |           |            | [place] [address]             |                   |
|        |           |            | [phone]         |                   |
|        |           |            | [phone] (Fax)   |                   |
+--------+-----------+------------+----------------------+-------------------+
 
| 02/10/ | Clinical  | Cardiology |                      |                   |
|    | Support   |            |                      |                   |
+--------+-----------+------------+----------------------+-------------------+
| / | Office    | Cardiology |   Tonia Wilson,    |                   |
|    | Visit     |            | ARNP  [address]   |                   |
|        |           |            | [place]  |                   |
|        |           |            | [address]  [phone]  |                   |
|        |           |            |  [phone] (Fax)  |                   |
+--------+-----------+------------+----------------------+-------------------+
| / | Off-Site  | Nephrology |   Marzena Moser  |                   |
2020   | Visit     |            | DO ETIENNE  [address] [place] [address]      |                   |
|        |           |            | [address]      |                   |
|        |           |            | [place]      |                   |
|        |           |            | [address]  [phone]  |                   |
|        |           |            |  [phone] (Fax)  |                   |
+--------+-----------+------------+----------------------+-------------------+
 documented as of this encounter
 
 Visit Diagnoses
 Not on filedocumented in this encounter

## 2020-01-08 NOTE — XMS
Encounter Summary
  Created on: 2020
 
 Viviana Ricci
 External Reference #: [id number]
 : 47
 Sex: Female
 
 Demographics
 
 
+-----------------------+----------------------+
| Address               | [address]      |
|                       | [place]  [address] |
+-----------------------+----------------------+
| Home Phone            | [phone]      |
+-----------------------+----------------------+
| Preferred Language    | Unknown              |
+-----------------------+----------------------+
| Marital Status        | Single               |
+-----------------------+----------------------+
| Spiritism Affiliation | [id number]                 |
+-----------------------+----------------------+
| Race                  | Unknown              |
+-----------------------+----------------------+
| Ethnic Group          | Unknown              |
+-----------------------+----------------------+
 
 
 Author
 
 
+--------------+--------------------------------------------+
| Author       | [facility]  |
|              | and [facility]                                |
+--------------+--------------------------------------------+
| Organization | [facility]  |
|              | and [facility]                                |
+--------------+--------------------------------------------+
| Address      | Unknown                                    |
+--------------+--------------------------------------------+
| Phone        | Unavailable                                |
+--------------+--------------------------------------------+
 
 
 
 Support
 
 
+----------------+--------------+---------------------+-----------------+
| Name           | Relationship | Address             | Phone           |
+----------------+--------------+---------------------+-----------------+
| Ada/Ed Radhames | ECON         | [address]              | [phone] |
|                |              | [place], [place]  |                 |
|                |              |  [id number]              |                 |
+----------------+--------------+---------------------+-----------------+
 
 
 
 
 Care Team Providers
 
 
+-----------------------+------+-------------+
| Care Team Member Name | Role | Phone       |
+-----------------------+------+-------------+
 PCP  | Unavailable |
+-----------------------+------+-------------+
 
 
 
 Reason for Visit
 Evaluate & Treat (Routine)
 
+--------+--------+------------+--------------+--------------+---------------+
| Status | Reason | Specialty  | Diagnoses /  | Referred By  | Referred To   |
|        |        |            | Procedures   | Contact      | Contact       |
+--------+--------+------------+--------------+--------------+---------------+
| Closed |        | Nephrology |   Diagnoses  |   Stroemel,  |   Stroemel,   |
|        |        |            |              | Marzena CLIFTON,   | Marzena CLIFTON DO |
|        |        |            | Complication | DO  [address] |   [address]    |
|        |        |            | s of         |  [place] | [place]   |
|        |        |            | transplanted |  [address]  [place]  | [address]  [place]    |
|        |        |            |  kidney      | [place]    | [place]     |
|        |        |            | Unspecified  | [address]        | [address]  Phone: |
|        |        |            | hypertensive | Phone:       |  [phone] |
|        |        |            |  kidney      | [phone] |   Fax:        |
|        |        |            | disease with |   Fax:       | [phone]  |
|        |        |            |  chronic     | [phone] |               |
|        |        |            | kidney       |              |               |
|        |        |            | disease      |              |               |
|        |        |            | stage I      |              |               |
|        |        |            | through      |              |               |
|        |        |            | stage IV, or |              |               |
|        |        |            |              |              |               |
|        |        |            | unspecified( |              |               |
|        |        |            | 403.90)      |              |               |
|        |        |            | Chronic      |              |               |
|        |        |            | glomerulonep |              |               |
|        |        |            | hritis with  |              |               |
|        |        |            | lesion of    |              |               |
|        |        |            | membranous   |              |               |
|        |        |            | glomerulonep |              |               |
|        |        |            | hritis       |              |               |
|        |        |            | Unspecified  |              |               |
|        |        |            | essential    |              |               |
|        |        |            | hypertension |              |               |
|        |        |            |   Procedures |              |               |
|        |        |            |   MA OFFICE  |              |               |
|        |        |            | OUTPATIENT   |              |               |
|        |        |            | VISIT 25     |              |               |
|        |        |            | MINUTES      |              |               |
+--------+--------+------------+--------------+--------------+---------------+
 
 
 
 
 Encounter Details
 
 
 
+--------+-----------+---------------------+----------------------+----------------------+
| Date   | Type      | Department          | Care Team            | Description          |
+--------+-----------+---------------------+----------------------+----------------------+
| / | Off-Site  |   [facility]         |   Marzena Moser  | FSGS (focal          |
| 2016   | Visit     | NEPHROLOGY  [address]   | M, DO  [address]      | segmental            |
|        |           | [address]   | [place] [place] [address]      | glomerulosclerosis)  |
|        |           | [place]     | [place]      | (Primary Dx); Renal  |
|        |           | [id number]          | [address]  [phone]  | transplant           |
|        |           | [phone]        |  [phone] (Fax)  | recipient;           |
|        |           |                     |                      | Hypertension,        |
|        |           |                     |                      | essential; Other     |
|        |           |                     |                      | specified            |
|        |           |                     |                      | hypothyroidism       |
+--------+-----------+---------------------+----------------------+----------------------+
 
 
 
 Social History
 
 
+--------------+-------+-----------+--------+------+
| Tobacco Use  | Types | Packs/Day | Years  | Date |
|              |       |           | Used   |      |
+--------------+-------+-----------+--------+------+
| Never Smoker |       |           |        |      |
+--------------+-------+-----------+--------+------+
 
 
 
+---------------------+---+---+---+
| Smokeless Tobacco:  |   |   |   |
| Never Used          |   |   |   |
+---------------------+---+---+---+
 
 
 
+---------------------------------------------------------------+
| Comments: some second hand smoke exposure, but fairly minimal |
+---------------------------------------------------------------+
 
 
 
+-------------+-------------+---------+----------+
| Alcohol Use | Drinks/Week | oz/Week | Comments |
+-------------+-------------+---------+----------+
| No          |             |         |          |
+-------------+-------------+---------+----------+
 
 
 
+------------------+---------------+
| Sex Assigned at  | Date Recorded |
| Birth            |               |
+------------------+---------------+
| Not on file      |               |
+------------------+---------------+
 
 
 
 
+----------------+-------------+-------------+
| Job Start Date | Occupation  | Industry    |
+----------------+-------------+-------------+
| Not on file    | Not on file | Not on file |
+----------------+-------------+-------------+
 
 
 
+----------------+--------------+------------+
| Travel History | Travel Start | Travel End |
+----------------+--------------+------------+
 
 
 
+-------------------------------------+
| No recent travel history available. |
+-------------------------------------+
 documented as of this encounter
 
 Last Filed Vital Signs
 
 
+-------------------+---------------------+----------------------+----------+
| Vital Sign        | Reading             | Time Taken           | Comments |
+-------------------+---------------------+----------------------+----------+
| Blood Pressure    | 158/82              | 2016  2:08 PM  |          |
|                   |                     | PST                  |          |
+-------------------+---------------------+----------------------+----------+
| Pulse             | -                   | -                    |          |
+-------------------+---------------------+----------------------+----------+
| Temperature       | 34.7   C (94.5   F) | 2016  2:08 PM  |          |
|                   |                     | PST                  |          |
+-------------------+---------------------+----------------------+----------+
| Respiratory Rate  | -                   | -                    |          |
+-------------------+---------------------+----------------------+----------+
| Oxygen Saturation | -                   | -                    |          |
+-------------------+---------------------+----------------------+----------+
| Inhaled Oxygen    | -                   | -                    |          |
| Concentration     |                     |                      |          |
+-------------------+---------------------+----------------------+----------+
| Weight            | 125 kg (275 lb 9.2  | 2016  2:08 PM  |          |
|                   | oz)                 | PST                  |          |
+-------------------+---------------------+----------------------+----------+
| Height            | -                   | -                    |          |
+-------------------+---------------------+----------------------+----------+
| Body Mass Index   | 44.48               | 2015  3:02 PM  |          |
|                   |                     | PST                  |          |
+-------------------+---------------------+----------------------+----------+
 documented in this encounter
 
 Progress Marzena Tamayo DO - 2016  2:03 PM PSTFormatting of this note might be different
 from the original.
 Subjective:  NEPHROLOGY 
  
 Patient ID: Viviana Ricci is a 70 y.o. female.
 
 HPI Comments: 
 Followup for this 70 YO  white female s/p renal allograft, 05, with remote  allograft 
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
 
demia, SHPTH,  previous low back pain, obesity/JACK, chronic pulmonary  hypertension, histori
izabela related to centripetal obesity, and  CAD, s/p CABG x3 vessels,. 
 
 Patient is feeling well without new complaints. She has been taking her meds faithfully. Sh
e denies SOB, fever, chest pain, or chest pain.  
 
 MEDS:
 
 Prograf 1.5 mg, BID
 Mycophenolate 250 mg, BID.
 Prednisone 5 mg, daily.
 Outpatient Prescriptions Marked as Taking for the 16 encounter (Off-Site Visit) with Maye Moser, DO 
 Medication Sig Dispense Refill 
   allopurinol (ZYLOPRIM) 100 mg tablet Take 1 tablet by mouth Daily. 30 tablet 11 
   apixaban (ELIQUIS) 2.5 mg tablet Take 1 tablet by mouth 2 times daily. 60 tablet 6 
   cholecalciferol (VITAMIN D-3) 2000 UNITS TABS Take 5,000 Units by mouth Every other day
.   
   cinacalcet (SENSIPAR) 30 mg tablet Take 1 tablet by mouth Daily. 30 tablet 12 
   fludrocortisone (FLORINEF) 0.1 mg tablet Take 1 tablet by mouth Every other day. 45 tab
let 2 
   furosemide (LASIX) 40 mg tablet Take 1 tablet by mouth Daily as needed. 30 tablet 5 
   glucose blood VI test strips (ONE TOUCH ULTRA TEST) strip Check blood sugar before each
 meal and as directed 100 each 12 
   insulin glargine (LANTUS) 100 units/mL injection (vial) Inject 20 Units under the skin 
every morning. 10 mL 11 
   insulin lispro (HUMALOG) 100 units/mL injection (vial) Inject subcutaneously before sara
ls according to sliding scale 10 vial 11 
   Insulin Syringe-Needle U-100 (BD INSULIN SYRINGE ULTRAFINE) 31G X 5/16" 0.5 ML MISC Use
 before meals and as directed. 100 each 11 
   levothyroxine (LEVOTHROID) 50 mcg tablet Take 1 tablet by mouth Daily. 30 tablet 11 
   lisinopril (PRINIVIL,ZESTRIL) 30 MG tablet Take 1 tablet by mouth Daily. 90 tablet 3 
   loperamide (ANTI-DIARRHEAL) 2 mg capsule Take 1 capsule by mouth 4 times daily as neede
d. 60 capsule 5 
   losartan (COZAAR) 50 mg tablet Take 1 tablet by mouth Daily. (Patient taking differentl
y: Take 50 mg by mouth 2 times daily.) 90 tablet 3 
   magnesium oxide (MAG-OX) 400 mg tablet Take 1 tablet by mouth 2 times daily. 62 tablet 
11 
   metoprolol tartrate (LOPRESSOR) 25 mg tablet Take 1 tablet by mouth 2 times daily. 60 t
ablet 11 
   omeprazole (PRILOSEC) 20 mg capsule Take one capsule by mouth once daily on an empty st
omach 90 capsule 4 
   Prenatal Multivit-Min-Fe-FA (PRENATAL VITAMINS) 0.8 MG TABS Take 0.8 mg by mouth Daily.
 30 each 11 
   Respiratory Therapy Supplies MISC Decrease CPAP to 12-18 cmH2O Diagnosis Code(s)327.23.
 Please send order to [facility]. 1 each 0 
   rosuvastatin (CRESTOR) 20 mg tablet Take 1 tablet by mouth nightly. 30 tablet 11 
 
 No Known Allergies
 
 Objective:   Blood pressure 158/82, temperature 34.7 C (94.5 F), weight 125 kg (275 lb 
9.2 oz).
 weight = refused.
  
 
 Physical Exam
 
 General: NAD, alert and oriented x 3
 HEENT: No thrush.
 Heart: Regular rate and rhythm, with no S3, S4, murmur or rub.
 
 Lungs:   CTA bilaterally, no rales or wheezes.
 Abdomen:  Soft, obese, the renal allograft in RLQ is nontender, normoactive bowel sounds.
 Extremities:  1+ edema on the left ankle.
 
 Lab Results 
 Component Value Date 
  NAEX 138 2016 
  KEX 4.5 2016 
  CLEX 106 2016 
  CO2EX 21 2016 
  BUNEX 29* 2016 
  CREEX 1.16 2016 
  EGFREX 46 2016 
  GLUEX 150 2016 
  PHOSEX 2.8 2015 
  MGEX 1.6* 2016 
  PTHEX 204.9* 2016 
  WWT0SSJ 7.8 2016 
 
 Lab Results 
 Component Value Date 
  CHOLEX 175 2016 
  HDLEX 55.8* 2016 
  LDLEX 69 2015 
  TRIGEX 207* 2016 
 
 Lab Results 
 Component Value Date 
  WBCEX 4.7 2016 
  HGBEX 13.7 2016 
  HCTEX 41.6 2016 
  PLTEX 156 2016 
 
 Lab Results 
 Component Value Date 
  TACROLIMUSEX 5.2 2016 
 
 Assessment: 
 
 1.  Renal Allograft-- allograft fucntion is stable.
 2.  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 
 3.  Type 2 DM, requiring insuline--reasonably good control.
 4.  Hyperlipidemia--on statin Rx
 5.  Hypertension--good control.
 6.  SHPTH--stable.  
 7.  Obesity/JACK/Pulmonary hypertension-- compensated.
 8.  CAD, s/p CABG, --stable on ASA, metoprolol, atorvastatin.
 9.  Type IV RTA--stable.
 10. Chronic Low Back pain--in remission.
 11.   DJD, left knee--about same.
 12.  s/p DVT left leg, 2015--stable on apixaban.
 
 Plan: 
 
 1.  Will add TSH to her standing order.
 2.  We reviewed her lab , Scr, and tacro. levels with her in detail.  Will continue the cur
rent dose.
 3.  Overall, she appears to have good BP control and stable graft fucntion on her current r
egiment.  Will plan to see her back  in 2016.
 She will do her standing order 1 week prior to that.
 
 
 Jhonathan Esposito, MS IV
 
 I have participated in the care of this patient and I have reviewed and agree with all pert
inent clinical information above including history, exam, and recommendations.
 
 Electronically signed by: Marzena Moser, 2016 14:32 
 
 CC:    Jose David Dalal M.D., [facility], [facility]
            Edgar Sanders MD, [facility], Orthopedics
 
 Electronically signed by Marzena Moser DO at 2016 10:22 AM PSTdocumented in thi
s encounter
 
 Plan of Treatment
 
 
+--------+-----------+------------+----------------------+-------------------+
| Date   | Type      | Specialty  | Care Team            | Description       |
+--------+-----------+------------+----------------------+-------------------+
| / | Office    | Cardiology |   Tonia Wilson,    |                   |
2020   | Visit     |            | ARNP  [address]   |                   |
|        |           |            |   [facility] [place]  |                   |
|        |           |            | [address]  [phone]  |                   |
|        |           |            |  [phone] (Fax)  |                   |
+--------+-----------+------------+----------------------+-------------------+
| / | Hospital  | Radiology  |   Popeye Townsend, |                   |
|    | Encounter |            |  MD  [address] [place] |                   |
|        |           |            |  St.  [place],   |                   |
|        |           |            | [place] [address]             |                   |
|        |           |            | [phone]         |                   |
|        |           |            | [phone] (Fax)   |                   |
+--------+-----------+------------+----------------------+-------------------+
| / | Surgery   | Radiology  |   Popeye Townsend, | CV EP PPM SYSTEM  |
|    |           |            |  MD  [address] | IMPLANT           |
|        |           |            |  St.  [place],   |                   |
|        |           |            | [place] [address]             |                   |
|        |           |            | [phone]         |                   |
|        |           |            | [phone] (Fax)   |                   |
+--------+-----------+------------+----------------------+-------------------+
| 02/10/ | Clinical  | Cardiology |                      |                   |
|    | Support   |            |                      |                   |
+--------+-----------+------------+----------------------+-------------------+
| / | Office    | Cardiology |   Tonia Wilson,    |                   |
|    | Visit     |            | [address] [place]   |                   |
|        |           |            | [place]  |                   |
|        |           |            | [address]  [phone]  |                   |
|        |           |            |  [phone] (Fax)  |                   |
+--------+-----------+------------+----------------------+-------------------+
| / | Off-Site  | Nephrology |   Marzena Moser  |                   |
2020   | Visit     |            | DO [place]TI[place]  [address] [place] [address]      |                   |
|        |           |            | [place]      |                   |
|        |           |            | [place]      |                   |
|        |           |            | [address]  [phone]  |                   |
|        |           |            |  [phone] (Fax)  |                   |
+--------+-----------+------------+----------------------+-------------------+
 documented as of this encounter
 
 Visit Diagnoses
 
 
 
+-----------------------------------------------------------------------------------------+
| Diagnosis                                                                               |
+-----------------------------------------------------------------------------------------+
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
| lesion of membranous glomerulonephritis                                                 |
+-----------------------------------------------------------------------------------------+
|   Renal transplant recipient                                                            |
+-----------------------------------------------------------------------------------------+
|   Hypertension, essential  Unspecified essential hypertension                           |
+-----------------------------------------------------------------------------------------+
|   Other specified hypothyroidism                                                        |
+-----------------------------------------------------------------------------------------+
 documented in this encounter

## 2020-01-08 NOTE — XMS
Encounter Summary
  Created on: 2020
 
 Viviana Ricci
 External Reference #: [id number]
 : 47
 Sex: Female
 
 Demographics
 
 
+-----------------------+----------------------+
| Address               | [address]      |
|                       | [place]  [address] |
+-----------------------+----------------------+
| Home Phone            | [phone]      |
+-----------------------+----------------------+
| Preferred Language    | Unknown              |
+-----------------------+----------------------+
| Marital Status        | Single               |
+-----------------------+----------------------+
| Sikh Affiliation | [id number]                 |
+-----------------------+----------------------+
| Race                  | Unknown              |
+-----------------------+----------------------+
| Ethnic Group          | Unknown              |
+-----------------------+----------------------+
 
 
 Author
 
 
+--------------+--------------------------------------------+
| Author       | [facility]  |
|              | and [facility]                                |
+--------------+--------------------------------------------+
| Organization | [facility]  |
|              | and [facility]                                |
+--------------+--------------------------------------------+
| Address      | Unknown                                    |
+--------------+--------------------------------------------+
| Phone        | Unavailable                                |
+--------------+--------------------------------------------+
 
 
 
 Support
 
 
+----------------+--------------+---------------------+-----------------+
| Name           | Relationship | Address             | Phone           |
+----------------+--------------+---------------------+-----------------+
| Ada/Ed Radhames | ECON         | [address]              | [phone] |
|                |              | [place], [place]  |                 |
|                |              |  [id number]              |                 |
+----------------+--------------+---------------------+-----------------+
 
 
 
 
 Care Team Providers
 
 
+-----------------------+------+-------------+
| Care Team Member Name | Role | Phone       |
+-----------------------+------+-------------+
 PCP  | Unavailable |
+-----------------------+------+-------------+
 
 
 
 Encounter Details
 
 
+--------+-----------+----------------------+----------------------+-------------+
| Date   | Type      | Department           | Care Team            | Description |
+--------+-----------+----------------------+----------------------+-------------+
| / | [facility]  |   [facility] |   Popeye Townsend, |             |
|    | Encounter |  MED CTR NUCLEAR     |  MD  [address] |             |
|        |           | MEDICINE  [address]  [address]   |             |
|        |           | [address]  [place], | [place] [address]             |             |
|        |           |  [place] [address]       | [phone]         |             |
|        |           | [phone]         | [phone] (Fax)   |             |
+--------+-----------+----------------------+----------------------+-------------+
 
 
 
 Social History
 
 
+--------------+-------+-----------+--------+------+
| Tobacco Use  | Types | Packs/Day | Years  | Date |
|              |       |           | Used   |      |
+--------------+-------+-----------+--------+------+
| Never Smoker |       |           |        |      |
+--------------+-------+-----------+--------+------+
 
 
 
+---------------------+---+---+---+
| Smokeless Tobacco:  |   |   |   |
| Never Used          |   |   |   |
+---------------------+---+---+---+
 
 
 
+---------------------------------------------------------------+
| Comments: some second hand smoke exposure, but fairly minimal |
+---------------------------------------------------------------+
 
 
 
+-------------+-------------+---------+----------+
| Alcohol Use | Drinks/Week | oz/Week | Comments |
+-------------+-------------+---------+----------+
| No          |             |         |          |
+-------------+-------------+---------+----------+
 
 
 
 
+------------------+---------------+
| Sex Assigned at  | Date Recorded |
| Birth            |               |
+------------------+---------------+
| Not on file      |               |
+------------------+---------------+
 
 
 
+----------------+-------------+-------------+
| Job Start Date | Occupation  | Industry    |
+----------------+-------------+-------------+
| Not on file    | Not on file | Not on file |
+----------------+-------------+-------------+
 
 
 
+----------------+--------------+------------+
| Travel History | Travel Start | Travel End |
+----------------+--------------+------------+
 
 
 
+-------------------------------------+
| No recent travel history available. |
+-------------------------------------+
 documented as of this encounter
 
 Medications at Time of Discharge
 
 
+----------------------+----------------------+-----------+---------+----------+-----------+
| Medication           | Sig                  | Dispensed | Refills | Start    | End Date  |
|                      |                      |           |         | Date     |           |
+----------------------+----------------------+-----------+---------+----------+-----------+
|   cholecalciferol    | Take 2,000 Units by  |           | 0       |          |           |
| (VITAMIN D-3) 2000   | mouth Every other    |           |         |          |           |
| UNITS                | day.                 |           |         |          |           |
| TABSIndications:     |                      |           |         |          |           |
| Unspecified          |                      |           |         |          |           |
| hypertensive kidney  |                      |           |         |          |           |
| disease with chronic |                      |           |         |          |           |
|  kidney disease      |                      |           |         |          |           |
| stage I through      |                      |           |         |          |           |
| stage IV, or         |                      |           |         |          |           |
| unspecified(403.90), |                      |           |         |          |           |
|  FSGS (focal         |                      |           |         |          |           |
| segmental            |                      |           |         |          |           |
| glomerulosclerosis), |                      |           |         |          |           |
|  Hyperlipidemia,     |                      |           |         |          |           |
| Complications of     |                      |           |         |          |           |
| transplanted kidney  |                      |           |         |          |           |
+----------------------+----------------------+-----------+---------+----------+-----------+
|   glucose blood VI   | Check blood sugar    |   100     | 12      | 20 |           |
| test strips (ONE     | before each meal and | each      |         | 12       |           |
| TOUCH ULTRA TEST)    |  as directed         |           |         |          |           |
| stripIndications:    |                      |           |         |          |           |
| Unspecified          |                      |           |         |          |           |
| hypertensive kidney  |                      |           |         |          |           |
 
| disease with chronic |                      |           |         |          |           |
|  kidney disease      |                      |           |         |          |           |
| stage I through      |                      |           |         |          |           |
| stage IV, or         |                      |           |         |          |           |
| unspecified(403.90), |                      |           |         |          |           |
|  Complications of    |                      |           |         |          |           |
| transplanted kidney, |                      |           |         |          |           |
|  Diabetes mellitus   |                      |           |         |          |           |
| type II,             |                      |           |         |          |           |
| uncontrolled (HCC),  |                      |           |         |          |           |
| Hyperlipidemia       |                      |           |         |          |           |
+----------------------+----------------------+-----------+---------+----------+-----------+
|   Respiratory        | Decrease CPAP to     |   1 each  | 0       | 06/19/20 |           |
| Therapy Supplies     | 12-18 cmH2O          |           |         | 13       |           |
| MISC                 | Diagnosis            |           |         |          |           |
|                      | Code(s)327.23.       |           |         |          |           |
|                      | Please send order to |           |         |          |           |
|                      |  [facility].     |           |         |          |           |
+----------------------+----------------------+-----------+---------+----------+-----------+
|   allopurinol        | Take 1 tablet by     |   30      | 11      | 02/10/20 |  |
| (ZYLOPRIM) 100 mg    | mouth Daily.         | tablet    |         | 14       | 5         |
| tablet               |                      |           |         |          |           |
+----------------------+----------------------+-----------+---------+----------+-----------+
|   aspirin 81 MG EC   | Take 81 mg by mouth  |           | 0       | 20 |  |
| tablet               | Daily.               |           |         | 12       | 5         |
+----------------------+----------------------+-----------+---------+----------+-----------+
|   cinacalcet         | Take 1 tablet by     |   30      | 12      | 20 |  |
| (SENSIPAR) 30 mg     | mouth Daily.         | tablet    |         | 13       | 4         |
| tablet               |                      |           |         |          |           |
+----------------------+----------------------+-----------+---------+----------+-----------+
|   fludrocortisone    | Take 1 tablet by     |   45      | 2       | 20 |  |
| (FLORINEF) 0.1 mg    | mouth Every other    | tablet    |         | 14       | 5         |
| tablet               | day.                 |           |         |          |           |
+----------------------+----------------------+-----------+---------+----------+-----------+
|   fluticasone        | Inhale 1 puff into   |   1       | 11      | 20 |  |
| (FLOVENT HFA) 220    | the lungs 2 times    | Inhaler   |         | 13       | 5         |
| mcg/puff inhaler     | daily. Rinse mouth   |           |         |          |           |
|                      | after use.           |           |         |          |           |
+----------------------+----------------------+-----------+---------+----------+-----------+
|   furosemide (LASIX) | Take 1 tablet by     |   30      | 5       | 20 |  |
|  40 mg tablet        | mouth Daily.         | tablet    |         | 13       | 4         |
+----------------------+----------------------+-----------+---------+----------+-----------+
|   insulin glargine   | Inject 20 Units      |   10 mL   | 0       | 20 |  |
| (LANTUS) 100         | under the skin every |           |         | 13       | 4         |
| units/mL             |  morning.            |           |         |          |           |
| injectionIndications |                      |           |         |          |           |
| : Unspecified        |                      |           |         |          |           |
| hypertensive kidney  |                      |           |         |          |           |
| disease with chronic |                      |           |         |          |           |
|  kidney disease      |                      |           |         |          |           |
| stage I through      |                      |           |         |          |           |
| stage IV, or         |                      |           |         |          |           |
| unspecified(403.90), |                      |           |         |          |           |
|  Complications of    |                      |           |         |          |           |
| transplanted kidney, |                      |           |         |          |           |
|  Diabetes mellitus   |                      |           |         |          |           |
| type II,             |                      |           |         |          |           |
| uncontrolled ([facility]),  |                      |           |         |          |           |
| Hyperlipidemia       |                      |           |         |          |           |
+----------------------+----------------------+-----------+---------+----------+-----------+
 
|   insulin lispro     | Inject               |   10 pen  | 5       | 20 |  |
| (HUMALOG) 100        | subcutaneously       |           |         | 13       | 4         |
| units/mL injection   | before meals         |           |         |          |           |
|                      | according to sliding |           |         |          |           |
|                      |  scale               |           |         |          |           |
+----------------------+----------------------+-----------+---------+----------+-----------+
|   Insulin            | Use before meals and |   100     | 11      | 20 |  |
| Syringe-Needle U-100 |  as directed.        | each      |         | 13       | 4         |
|  (BD INSULIN SYRINGE |                      |           |         |          |           |
|  ULTRAFINE) 31G X    |                      |           |         |          |           |
| " 0.5 ML MISC    |                      |           |         |          |           |
+----------------------+----------------------+-----------+---------+----------+-----------+
|   levothyroxine      | Take 1 tablet by     |   30      | 11      | 20 | 10/06/201 |
| (LEVOTHROID) 50 mcg  | mouth Daily.         | tablet    |         | 13       | 4         |
| tablet               |                      |           |         |          |           |
+----------------------+----------------------+-----------+---------+----------+-----------+
|   lisinopril         | Take 1 tablet by     |   90      | 3       | 20 |  |
| (PRINIVIL,ZESTRIL)   | mouth Daily.         | tablet    |         | 13       | 4         |
| 30 MG tablet         |                      |           |         |          |           |
+----------------------+----------------------+-----------+---------+----------+-----------+
|   loperamide         | Take 2 mg by mouth   |           | 0       | 20 |  |
| (ANTI-DIARRHEAL) 2   | Daily as needed.     |           |         | 12       | 4         |
| mg capsule           |                      |           |         |          |           |
+----------------------+----------------------+-----------+---------+----------+-----------+
|   magnesium oxide    | Take 1 tablet by     |   62      | 12      | 20 |  |
| (MAG-OX) 400 mg      | mouth 2 times daily. | tablet    |         | 13       | 4         |
| tablet               |                      |           |         |          |           |
+----------------------+----------------------+-----------+---------+----------+-----------+
|   metoprolol         | Take 1 tablet by     |   60      | 11      | 20 |  |
| tartrate (LOPRESSOR) | mouth 2 times daily. | tablet    |         | 13       | 4         |
|  25 mg tablet        |                      |           |         |          |           |
+----------------------+----------------------+-----------+---------+----------+-----------+
|   mycophenolate      | Take 250 mg by mouth |           | 0       | 20 | 10/14/201 |
| (CELLCEPT) 250 mg    |  3 times daily.      |           |         | 11       | 5         |
| capsule              |                      |           |         |          |           |
+----------------------+----------------------+-----------+---------+----------+-----------+
|   omeprazole         | Take one capsule by  |   90      | 3       | 20 |  |
| (PRILOSEC) 20 mg     | mouth once daily on  | capsule   |         | 13       | 4         |
| capsule              | an empty stomach     |           |         |          |           |
+----------------------+----------------------+-----------+---------+----------+-----------+
|   predniSONE         | Take 1 tablet by     |   90      | 3       | 20 |  |
| (DELTASONE) 5 mg     | mouth Daily.         | tablet    |         | 14       | 5         |
| tablet               |                      |           |         |          |           |
+----------------------+----------------------+-----------+---------+----------+-----------+
|   Prenatal           | Take 0.8 mg by mouth |   30 each | 11      | 20 |  |
| Multivit-Min-Fe-FA   |  Daily.              |           |         | 13       | 4         |
| (PRENATAL VITAMINS)  |                      |           |         |          |           |
| 0.8 MG TABS          |                      |           |         |          |           |
+----------------------+----------------------+-----------+---------+----------+-----------+
|   Prenatal Vit-Fe    |                      |           | 0       | 20 |  |
| Fumarate-FA (PNV     |                      |           |         | 13       | 4         |
| PRENATAL PLUS        |                      |           |         |          |           |
| MULTIVITAMIN) 27-1   |                      |           |         |          |           |
| MG TABS              |                      |           |         |          |           |
+----------------------+----------------------+-----------+---------+----------+-----------+
|   rosuvastatin       | Take 1 tablet by     |   30      | 11      | 20 |  |
| (CRESTOR) 20 mg      | mouth nightly.       | tablet    |         | 13       | 4         |
| tablet               |                      |           |         |          |           |
+----------------------+----------------------+-----------+---------+----------+-----------+
|   tacrolimus         | TAD.                 |           | 0       | 20 | 07/15/201 |
 
| (PROGRAF) 0.5 mg     |                      |           |         | 12       | 4         |
| capsuleIndications:  |                      |           |         |          |           |
| Unspecified          |                      |           |         |          |           |
| hypertensive kidney  |                      |           |         |          |           |
| disease with chronic |                      |           |         |          |           |
|  kidney disease      |                      |           |         |          |           |
| stage I through      |                      |           |         |          |           |
| stage IV, or         |                      |           |         |          |           |
| unspecified(403.90), |                      |           |         |          |           |
|  Complications of    |                      |           |         |          |           |
| transplanted kidney, |                      |           |         |          |           |
|  Diabetes mellitus   |                      |           |         |          |           |
| type II,             |                      |           |         |          |           |
| uncontrolled (HCC),  |                      |           |         |          |           |
| Hyperlipidemia       |                      |           |         |          |           |
+----------------------+----------------------+-----------+---------+----------+-----------+
|   tacrolimus         | Take 1.5 mg by mouth |           | 0       | 20 |  |
| (PROGRAF) 1 mg       |  2 times daily.      |           |         | 13       | 4         |
| capsuleIndications:  |                      |           |         |          |           |
| Unspecified          |                      |           |         |          |           |
| hypertensive kidney  |                      |           |         |          |           |
| disease with chronic |                      |           |         |          |           |
|  kidney disease      |                      |           |         |          |           |
| stage I through      |                      |           |         |          |           |
| stage IV, or         |                      |           |         |          |           |
| unspecified(403.90), |                      |           |         |          |           |
|  FSGS (focal         |                      |           |         |          |           |
| segmental            |                      |           |         |          |           |
| glomerulosclerosis), |                      |           |         |          |           |
|  Hyperlipidemia,     |                      |           |         |          |           |
| Complications of     |                      |           |         |          |           |
| transplanted kidney  |                      |           |         |          |           |
+----------------------+----------------------+-----------+---------+----------+-----------+
|   valsartan (DIOVAN) | Take 1 tablet by     |   30      | 11      | 20 |  |
|  160 mg tablet       | mouth Daily.         | tablet    |         | 14       | 4         |
+----------------------+----------------------+-----------+---------+----------+-----------+
 documented as of this encounter
 
 Plan of Treatment
 
 
+--------+-----------+------------+----------------------+-------------------+
| Date   | Type      | Specialty  | Care Team            | Description       |
+--------+-----------+------------+----------------------+-------------------+
| / | Office    | Cardiology |   Tonia Wilson,    |                   |
|    | Visit     |            | ARNP  [address]   |                   |
|        |           |            | [place]  |                   |
|        |           |            | [id number] [phone]  |                   |
|        |           |            |  [phone] (Fax)  |                   |
+--------+-----------+------------+----------------------+-------------------+
| / | Hospital  | Radiology  |   Popeye Townsend, |                   |
|    | Encounter |            |  MD  [address] |                   |
|        |           |            |  [place]   |                   |
|        |           |            | [place] [address]             |                   |
|        |           |            | [phone]         |                   |
|        |           |            | [phone] (Fax)   |                   |
+--------+-----------+------------+----------------------+-------------------+
| / | Surgery   | Radiology  |   IrineoroycechidiPopeye, | CV EP PPM SYSTEM  |
|    |           |            |  MD  [address] | IMPLANT           |
|        |           |            |  [place]   |                   |
 
|        |           |            | [place] [address]             |                   |
|        |           |            | [phone]         |                   |
|        |           |            | [phone] (Fax)   |                   |
+--------+-----------+------------+----------------------+-------------------+
| 02/10/ | Clinical  | Cardiology |                      |                   |
|    | Support   |            |                      |                   |
+--------+-----------+------------+----------------------+-------------------+
| / | Office    | Cardiology |   Tonia Wilson,    |                   |
|    | Visit     |            | ARNJESSICA  [address]   |                   |
|        |           |            | St  [place]  |                   |
|        |           |            | [address]  [phone]  |                   |
|        |           |            |  [phone] (Fax)  |                   |
+--------+-----------+------------+----------------------+-------------------+
| / | Off-Site  | Nephrology |   Marzena Moser  |                   |
2020   | Visit     |            | DO ETIENNE  [address]      |                   |
|        |           |            | [address]      |                   |
|        |           |            | [place]      |                   |
|        |           |            | [address]  [phone]  |                   |
|        |           |            |  [phone] (Fax)  |                   |
+--------+-----------+------------+----------------------+-------------------+
 documented as of this encounter
 
 Procedures
 
 
+----------------------+--------+-------------+----------------------+----------------------
+
| Procedure Name       | Priori | Date/Time   | Associated Diagnosis | Comments             
|
|                      | ty     |             |                      |                      
|
+----------------------+--------+-------------+----------------------+----------------------
+
| NM NUCLEAR STRESS    | Routin | 2014  |   Other chest pain   |   Results for this   
|
| TEST (PHARMACOLOGIC  | e      | 11:56 AM    | CAD (coronary artery | procedure are in the 
|
| - VASODILATOR)       |        | PDT         |  disease)  Pre-op    |  results section.    
|
|                      |        |             | evaluation           |                      
|
+----------------------+--------+-------------+----------------------+----------------------
+
 documented in this encounter
 
 Visit Diagnoses
 Not on filedocumented in this encounter
 
 Administered Medications
 
 
+-----------------------------------+--------+----------+----------+------+------+
| Medication Order                  | MAR    | Action   | Dose     | Rate | Site |
|                                   | Action | Date     |          |      |      |
+-----------------------------------+--------+----------+----------+------+------+
|   technetium TC-99M sestamibi     | Given  | 20 | 30       |      |      |
| (CARDIOLITE) injection 30         |        | 14 11:55 | -millicu |      |      |
| millicurie  30 -millicurie,       |        |  AM PDT  | perla      |      |      |
| Intravenous, ONCE PRN, Other,     |        |          |          |      |      |
| Starting Fri 14 at 1155, For |        |          |          |      |      |
 
|  1 dose, Nuclear Medicine         |        |          |          |      |      |
+-----------------------------------+--------+----------+----------+------+------+
 
 
 
+---+---+
|   |   |
+---+---+
 documented in this encounter

## 2020-01-08 NOTE — XMS
Encounter Summary
  Created on: 2020
 
 Viviana Ricci
 External Reference #: [id number]
 : 47
 Sex: Female
 
 Demographics
 
 
+-----------------------+----------------------+
| Address               | [address]      |
|                       | [place]  [address] |
+-----------------------+----------------------+
| Home Phone            | [phone]      |
+-----------------------+----------------------+
| Preferred Language    | Unknown              |
+-----------------------+----------------------+
| Marital Status        | Single               |
+-----------------------+----------------------+
| Voodoo Affiliation | [id number]                 |
+-----------------------+----------------------+
| Race                  | Unknown              |
+-----------------------+----------------------+
| Ethnic Group          | Unknown              |
+-----------------------+----------------------+
 
 
 Author
 
 
+--------------+--------------------------------------------+
| Author       | [facility]  |
|              | and [facility]                                |
+--------------+--------------------------------------------+
| Organization | [facility]  |
|              | and [facility]                                |
+--------------+--------------------------------------------+
| Address      | Unknown                                    |
+--------------+--------------------------------------------+
| Phone        | Unavailable                                |
+--------------+--------------------------------------------+
 
 
 
 Support
 
 
+----------------+--------------+---------------------+-----------------+
| Name           | Relationship | Address             | Phone           |
+----------------+--------------+---------------------+-----------------+
| Ada/Ed Radhames | ECON         | [address]              | [phone] |
|                |              | [place]  |                 |
|                |              |  [id number]              |                 |
+----------------+--------------+---------------------+-----------------+
 
 
 
 
 Care Team Providers
 
 
+-----------------------+------+-------------+
| Care Team Member Name | Role | Phone       |
+-----------------------+------+-------------+
 PCP  | Unavailable |
+-----------------------+------+-------------+
 
 
 
 Encounter Details
 
 
+--------+----------+----------------------+----------------------+-------------+
| Date   | Type     | Department           | Care Team            | Description |
+--------+----------+----------------------+----------------------+-------------+
| / | Abstract |   [facility]  |   [facility] |             |
|    |          | Conversion Location  |  [facility]                  |             |
|        |          |  [phone]        |                      |             |
+--------+----------+----------------------+----------------------+-------------+
 
 
 
 Social History
 
 
+----------------+-------+-----------+--------+------+
| Tobacco Use    | Types | Packs/Day | Years  | Date |
|                |       |           | Used   |      |
+----------------+-------+-----------+--------+------+
| Never Assessed |       |           |        |      |
+----------------+-------+-----------+--------+------+
 
 
 
+------------------+---------------+
| Sex Assigned at  | Date Recorded |
| Birth            |               |
+------------------+---------------+
| Not on file      |               |
+------------------+---------------+
 
 
 
+----------------+-------------+-------------+
| Job Start Date | Occupation  | Industry    |
+----------------+-------------+-------------+
| Not on file    | Not on file | Not on file |
+----------------+-------------+-------------+
 
 
 
+----------------+--------------+------------+
| Travel History | Travel Start | Travel End |
+----------------+--------------+------------+
 
 
 
 
+-------------------------------------+
| No recent travel history available. |
+-------------------------------------+
 documented as of this encounter
 
 Last Filed Vital Signs
 
 
+-------------------+----------------------+----------------------+----------+
| Vital Sign        | Reading              | Time Taken           | Comments |
+-------------------+----------------------+----------------------+----------+
| Blood Pressure    | 130/78               | 2012 12:00 AM  |          |
|                   |                      | PDT                  |          |
+-------------------+----------------------+----------------------+----------+
| Pulse             | -                    | -                    |          |
+-------------------+----------------------+----------------------+----------+
| Temperature       | -                    | -                    |          |
+-------------------+----------------------+----------------------+----------+
| Respiratory Rate  | -                    | -                    |          |
+-------------------+----------------------+----------------------+----------+
| Oxygen Saturation | -                    | -                    |          |
+-------------------+----------------------+----------------------+----------+
| Inhaled Oxygen    | -                    | -                    |          |
| Concentration     |                      |                      |          |
+-------------------+----------------------+----------------------+----------+
| Weight            | 128.5 kg (283 lb 3.2 | 2012 12:00 AM  |          |
|                   |  oz)                 | PDT                  |          |
+-------------------+----------------------+----------------------+----------+
| Height            | 167.6 cm (5' 6")     | 2010 12:00 AM  |          |
|                   |                      | PDT                  |          |
+-------------------+----------------------+----------------------+----------+
| Body Mass Index   | 45.71                | 2010 12:00 AM  |          |
|                   |                      | PDT                  |          |
+-------------------+----------------------+----------------------+----------+
 documented in this encounter
 
 Plan of Treatment
 
 
+--------+-----------+------------+----------------------+-------------------+
| Date   | Type      | Specialty  | Care Team            | Description       |
+--------+-----------+------------+----------------------+-------------------+
| / | Office    | Cardiology |   Tonia Wilson,    |                   |
| 2020   | Visit     |            | ARNP  [address]   |                   |
|        |           |            | St  [place]  |                   |
|        |           |            | [address]  [phone]  |                   |
|        |           |            |  [phone] (Fax)  |                   |
+--------+-----------+------------+----------------------+-------------------+
| / | Hospital  | Radiology  |   Popeye Townsend, |                   |
|    | Encounter |            |  MD  [address] |                   |
|        |           |            |  St.  [place],   |                   |
|        |           |            | [place] [address]             |                   |
|        |           |            | [phone]         |                   |
|        |           |            | [phone] (Fax)   |                   |
+--------+-----------+------------+----------------------+-------------------+
| / | Surgery   | Radiology  |   Popeye Townsend, | CV EP PPM SYSTEM  |
|    |           |            |  MD  [address] | IMPLANT           |
|        |           |            |  St.  [place],   |                   |
|        |           |            | [place] [address]             |                   |
|        |           |            | [phone]         |                   |
 
|        |           |            | [phone] (Fax)   |                   |
+--------+-----------+------------+----------------------+-------------------+
| 02/10/ | Clinical  | Cardiology |                      |                   |
|    | Support   |            |                      |                   |
+--------+-----------+------------+----------------------+-------------------+
| / | Office    | Cardiology |   Tonia Wilson,    |                   |
|    | Visit     |            | [facility]  [address]   |                   |
|        |           |            | St  [place]  |                   |
|        |           |            | [address]  [phone]  |                   |
|        |           |            |  [phone] (Fax)  |                   |
+--------+-----------+------------+----------------------+-------------------+
| / | Off-Site  | Nephrology |   Marzena Moser  |                   |
2020   | Visit     |            | DO ETIENNE  [address] [place] [address]      |                   |
|        |           |            | [address]      |                   |
|        |           |            | [place], [place]      |                   |
|        |           |            | [address]  [phone]  |                   |
|        |           |            |  [phone] (Fax)  |                   |
+--------+-----------+------------+----------------------+-------------------+
 documented as of this encounter
 
 Procedures
 
 
+----------------------+--------+-------------+----------------------+----------------------
+
| Procedure Name       | Priori | Date/Time   | Associated Diagnosis | Comments             
|
|                      | ty     |             |                      |                      
|
+----------------------+--------+-------------+----------------------+----------------------
+
| LABS - EXTERNAL SCAN |        | 07/10/2013  |                      |   Results for this   
|
|                      |        | 12:00 AM    |                      | procedure are in the 
|
|                      |        | PDT         |                      |  results section.    
|
+----------------------+--------+-------------+----------------------+----------------------
+
| PAP SMEAR            | Routin | 2000  |                      |   Results for this   
|
|                      | e      | 12:00 AM    |                      | procedure are in the 
|
|                      |        | PST         |                      |  results section.    
|
+----------------------+--------+-------------+----------------------+----------------------
+
| TENISHA SCREENING        | Routin | 2000  |                      |   Results for this   
|
| BILATERAL            | e      | 12:00 AM    |                      | procedure are in the 
|
|                      |        | PST         |                      |  results section.    
|
+----------------------+--------+-------------+----------------------+----------------------
+
 documented in this encounter
 
 Results
 LABS - EXTERNAL SCAN (07/10/2013 12:00 AM PDT)
 
 
+------------------------------------------------------------------------+--------------+
| Narrative                                                              | Performed At |
+------------------------------------------------------------------------+--------------+
|   This result has an attachment that is not available.  Ordered by an  |              |
| unspecified provider.                                                  |              |
+------------------------------------------------------------------------+--------------+
 
 
 
+--------------------------------------------+
| Transcriptions                             |
+--------------------------------------------+
|   Onbase, Wamt - 07/10/2013 12:00 AM PDT   |
+--------------------------------------------+
 Pap Smear (2000 12:00 AM PST)
 
+----------+
| Specimen |
+----------+
|          |
+----------+
 
 
 
+-----------+--------------+
| Narrative | Performed At |
+-----------+--------------+
|           |              |
+-----------+--------------+
 TENISHA Screening Bilateral (2000 12:00 AM PST)
 
+----------+
| Specimen |
+----------+
|          |
+----------+
 
 
 
+-----------+--------------+
| Narrative | Performed At |
+-----------+--------------+
|           |              |
+-----------+--------------+
 documented in this encounter
 
 Visit Diagnoses
 Not on filedocumented in this encounter

## 2020-01-08 NOTE — XMS
Encounter Summary
  Created on: 2020
 
 Viviana Ricci
 External Reference #: [id number]
 : 47
 Sex: Female
 
 Demographics
 
 
+-----------------------+----------------------+
| Address               | [address]      |
|                       | [place]  [address] |
+-----------------------+----------------------+
| Home Phone            | [phone]      |
+-----------------------+----------------------+
| Preferred Language    | Unknown              |
+-----------------------+----------------------+
| Marital Status        | Single               |
+-----------------------+----------------------+
| Buddhist Affiliation | 1009                 |
+-----------------------+----------------------+
| Race                  | Unknown              |
+-----------------------+----------------------+
| Ethnic Group          | Unknown              |
+-----------------------+----------------------+
 
 
 Author
 
 
+--------------+--------------------------------------------+
| Author       | [facility]  |
|              | and [facility]                                |
+--------------+--------------------------------------------+
| Organization | [facility]  |
|              | and [facility]                                |
+--------------+--------------------------------------------+
| Address      | Unknown                                    |
+--------------+--------------------------------------------+
| Phone        | Unavailable                                |
+--------------+--------------------------------------------+
 
 
 
 Support
 
 
+----------------+--------------+---------------------+-----------------+
| Name           | Relationship | Address             | Phone           |
+----------------+--------------+---------------------+-----------------+
| Ada/Ed Radhames | ECON         | [address]              | [phone] |
|                |              | [place]  |                 |
|                |              |  [id number]              |                 |
+----------------+--------------+---------------------+-----------------+
 
 
 
 
 Care Team Providers
 
 
+------------------------+------+-----------------+
| Care Team Member Name  | Role | Phone           |
+------------------------+------+-----------------+
| Marzena Moser DO | PCP  | [phone] |
+------------------------+------+-----------------+
 
 
 
 Reason for Visit
 
 
+-------------------+----------+
| Reason            | Comments |
+-------------------+----------+
| Medication Refill |          |
+-------------------+----------+
 
 
 
 Encounter Details
 
 
+--------+--------+---------------------+----------------------+-------------------+
| Date   | Type   | Department          | Care Team            | Description       |
+--------+--------+---------------------+----------------------+-------------------+
| / | Refill |   [facility]         |   Marzena Moser  | Medication Refill |
| 2018   |        | NEPHROLOGY  [address]   | M, DO  [address]      |                   |
|        |        | [address]   | [address]      |                   |
|        |        | [place], [place]     | [place], [place]      |                   |
|        |        | [address]          | [address]  [phone]  |                   |
|        |        | [phone]        |  [phone] (Fax)  |                   |
+--------+--------+---------------------+----------------------+-------------------+
 
 
 
 Social History
 
 
+--------------+-------+-----------+--------+------+
| Tobacco Use  | Types | Packs/Day | Years  | Date |
|              |       |           | Used   |      |
+--------------+-------+-----------+--------+------+
| Never Smoker |       |           |        |      |
+--------------+-------+-----------+--------+------+
 
 
 
+---------------------+---+---+---+
| Smokeless Tobacco:  |   |   |   |
| Never Used          |   |   |   |
+---------------------+---+---+---+
 
 
 
+---------------------------------------------------------------+
| Comments: some second hand smoke exposure, but fairly minimal |
 
+---------------------------------------------------------------+
 
 
 
+-------------+-------------+---------+----------+
| Alcohol Use | Drinks/Week | oz/Week | Comments |
+-------------+-------------+---------+----------+
| No          |             |         |          |
+-------------+-------------+---------+----------+
 
 
 
+------------------+---------------+
| Sex Assigned at  | Date Recorded |
| Birth            |               |
+------------------+---------------+
| Not on file      |               |
+------------------+---------------+
 
 
 
+----------------+-------------+-------------+
| Job Start Date | Occupation  | Industry    |
+----------------+-------------+-------------+
| Not on file    | Not on file | Not on file |
+----------------+-------------+-------------+
 
 
 
+----------------+--------------+------------+
| Travel History | Travel Start | Travel End |
+----------------+--------------+------------+
 
 
 
+-------------------------------------+
| No recent travel history available. |
+-------------------------------------+
 documented as of this encounter
 
 Plan of Treatment
 
 
+--------+-----------+------------+----------------------+-------------------+
| Date   | Type      | Specialty  | Care Team            | Description       |
+--------+-----------+------------+----------------------+-------------------+
| / | Office    | Cardiology |   Tonia Wilson,    |                   |
|    | Visit     |            | ARNP  [address]   |                   |
|        |           |            | St [place], [place]  |                   |
|        |           |            | [address]  [phone]  |                   |
|        |           |            |  [phone] (Fax)  |                   |
+--------+-----------+------------+----------------------+-------------------+
| / | Hospital  | Radiology  |   Popeye Townsend, |                   |
|    | Encounter |            |  MD  [address] |                   |
|        |           |            |  [place],   |                   |
|        |           |            | [place] [address]             |                   |
|        |           |            | [phone]         |                   |
|        |           |            | [phone] (Fax)   |                   |
+--------+-----------+------------+----------------------+-------------------+
| / | Surgery   | Radiology  |   Popeye Townsend, | CV EP PPM SYSTEM  |
 
|    |           |            |  MD  [address] | IMPLANT           |
|        |           |            |  [place],   |                   |
|        |           |            | [place] [address]             |                   |
|        |           |            | [phone]         |                   |
|        |           |            | [phone] (Fax)   |                   |
+--------+-----------+------------+----------------------+-------------------+
| 02/10/ | Clinical  | Cardiology |                      |                   |
|    | Support   |            |                      |                   |
+--------+-----------+------------+----------------------+-------------------+
| / | Office    | Cardiology |   RakeshTonia andre,    |                   |
|    | Visit     |            | ARNP  [address]   |                   |
|        |           |            | [place]  |                   |
|        |           |            | [address] [phone]  |                   |
|        |           |            |  [phone] (Fax)  |                   |
+--------+-----------+------------+----------------------+-------------------+
| / | Off-Site  | Nephrology |   Marzena Moser  |                   |
|    | Visit     |            | DO ETIENNE  [address] [place] [address]      |                   |
|        |           |            | [address]      |                   |
|        |           |            | [place]      |                   |
|        |           |            | [address] [phone]  |                   |
|        |           |            |  [phone] (Fax)  |                   |
+--------+-----------+------------+----------------------+-------------------+
 documented as of this encounter
 
 Visit Diagnoses
 Not on filedocumented in this encounter

## 2020-01-08 NOTE — XMS
Encounter Summary
  Created on: 2020
 
 Viviana Ricci
 External Reference #: [id number]
 : 47
 Sex: Female
 
 Demographics
 
 
+-----------------------+----------------------+
| Address               | [address]      |
|                       | [place]  [address] |
+-----------------------+----------------------+
| Home Phone            | [phone]      |
+-----------------------+----------------------+
| Preferred Language    | Unknown              |
+-----------------------+----------------------+
| Marital Status        | Single               |
+-----------------------+----------------------+
| Denominational Affiliation | 1009                 |
+-----------------------+----------------------+
| Race                  | Unknown              |
+-----------------------+----------------------+
| Ethnic Group          | Unknown              |
+-----------------------+----------------------+
 
 
 Author
 
 
+--------------+--------------------------------------------+
| Author       | [facility]  |
|              | and [place]                                |
+--------------+--------------------------------------------+
| Organization | [facility]  |
|              | and [place]                                |
+--------------+--------------------------------------------+
| Address      | Unknown                                    |
+--------------+--------------------------------------------+
| Phone        | Unavailable                                |
+--------------+--------------------------------------------+
 
 
 
 Support
 
 
+----------------+--------------+---------------------+-----------------+
| Name           | Relationship | Address             | Phone           |
+----------------+--------------+---------------------+-----------------+
| Ada/Ed Radhames | ECON         | [address]              | [phone] |
|                |              | [place]  |                 |
|                |              |  [address]              |                 |
+----------------+--------------+---------------------+-----------------+
 
 
 
 
 Care Team Providers
 
 
+-----------------------+------+-------------+
| Care Team Member Name | Role | Phone       |
+-----------------------+------+-------------+
 PCP  | Unavailable |
+-----------------------+------+-------------+
 
 
 
 Reason for Visit
 
 
+-------------------+----------+
| Reason            | Comments |
+-------------------+----------+
| Medication Refill |          |
+-------------------+----------+
 
 
 
 Encounter Details
 
 
+--------+--------+---------------------+----------------------+-------------------+
| Date   | Type   | Department          | Care Team            | Description       |
+--------+--------+---------------------+----------------------+-------------------+
| / | Refill |   [place]         |   Marzena Moser  | Medication Refill |
|    |        | NEPHROLOGY  [address]   | M, DO  [address]      |                   |
|        |        | [address]   | [address]      |                   |
|        |        | [place], [place]     | [place], [place]      |                   |
|        |        | [address]          | [address]  [phone]  |                   |
|        |        | [phone]        |  [phone] (Fax)  |                   |
+--------+--------+---------------------+----------------------+-------------------+
 
 
 
 Social History
 
 
+--------------+-------+-----------+--------+------+
| Tobacco Use  | Types | Packs/Day | Years  | Date |
|              |       |           | Used   |      |
+--------------+-------+-----------+--------+------+
| Never Smoker |       |           |        |      |
+--------------+-------+-----------+--------+------+
 
 
 
+---------------------+---+---+---+
| Smokeless Tobacco:  |   |   |   |
| Never Used          |   |   |   |
+---------------------+---+---+---+
 
 
 
+---------------------------------------------------------------+
| Comments: some second hand smoke exposure, but fairly minimal |
 
+---------------------------------------------------------------+
 
 
 
+-------------+-------------+---------+----------+
| Alcohol Use | Drinks/Week | oz/Week | Comments |
+-------------+-------------+---------+----------+
| No          |             |         |          |
+-------------+-------------+---------+----------+
 
 
 
+------------------+---------------+
| Sex Assigned at  | Date Recorded |
| Birth            |               |
+------------------+---------------+
| Not on file      |               |
+------------------+---------------+
 
 
 
+----------------+-------------+-------------+
| Job Start Date | Occupation  | Industry    |
+----------------+-------------+-------------+
| Not on file    | Not on file | Not on file |
+----------------+-------------+-------------+
 
 
 
+----------------+--------------+------------+
| Travel History | Travel Start | Travel End |
+----------------+--------------+------------+
 
 
 
+-------------------------------------+
| No recent travel history available. |
+-------------------------------------+
 documented as of this encounter
 
 Plan of Treatment
 
 
+--------+-----------+------------+----------------------+-------------------+
| Date   | Type      | Specialty  | Care Team            | Description       |
+--------+-----------+------------+----------------------+-------------------+
| / | Office    | Cardiology |   HellalfredoTonia,    |                   |
|    | Visit     |            | ARNP  [address]   |                   |
|        |           |            | St  [place], [place]  |                   |
|        |           |            | [address]  [phone]  |                   |
|        |           |            |  [phone] (Fax)  |                   |
+--------+-----------+------------+----------------------+-------------------+
| / | Hospital  | Radiology  |   Popeye Townsend, |                   |
|    | Encounter |            |  MD  [address] |                   |
|        |           |            |  St.  [place],   |                   |
|        |           |            | [place] [address]             |                   |
|        |           |            | [phone]         |                   |
|        |           |            | [phone] (Fax)   |                   |
+--------+-----------+------------+----------------------+-------------------+
| / | Surgery   | Radiology  |   Popeye Townsedn, | CV EP PPM SYSTEM  |
 
|    |           |            |  MD  [address] | IMPLANT           |
|        |           |            |  St.  [place],   |                   |
|        |           |            | [place] [address]             |                   |
|        |           |            | [phone]         |                   |
|        |           |            | [phone] (Fax)   |                   |
+--------+-----------+------------+----------------------+-------------------+
| 02/10/ | Clinical  | Cardiology |                      |                   |
|    | Support   |            |                      |                   |
+--------+-----------+------------+----------------------+-------------------+
| / | Office    | Cardiology |   Tonia Wilson,    |                   |
|    | Visit     |            | ARNP  [address]   |                   |
|        |           |            | [place]  |                   |
|        |           |            | [address]  [phone]  |                   |
|        |           |            |  [phone] (Fax)  |                   |
+--------+-----------+------------+----------------------+-------------------+
| / | Off-Site  | Nephrology |   Marzena Moser  |                   |
|    | Visit     |            | DO ETIENNE  [address] [place] [address]      |                   |
|        |           |            | [address]      |                   |
|        |           |            | [place]      |                   |
|        |           |            | [address]  [phone]  |                   |
|        |           |            |  [phone] (Fax)  |                   |
+--------+-----------+------------+----------------------+-------------------+
 documented as of this encounter
 
 Visit Diagnoses
 Not on filedocumented in this encounter

## 2020-01-08 NOTE — XMS
Encounter Summary
  Created on: 2020
 
 Viviana Ricci
 External Reference #: [id number]
 : 47
 Sex: Female
 
 Demographics
 
 
+-----------------------+----------------------+
| Address               | [address]      |
|                       | [place]  [address] |
+-----------------------+----------------------+
| Home Phone            | [phone]      |
+-----------------------+----------------------+
| Preferred Language    | Unknown              |
+-----------------------+----------------------+
| Marital Status        | Single               |
+-----------------------+----------------------+
| Jew Affiliation | [id number]                 |
+-----------------------+----------------------+
| Race                  | Unknown              |
+-----------------------+----------------------+
| Ethnic Group          | Unknown              |
+-----------------------+----------------------+
 
 
 Author
 
 
+--------------+--------------------------------------------+
| Author       | [facility]  |
|              | and [place]                                |
+--------------+--------------------------------------------+
| Organization | [facility]  |
|              | and [place]                                |
+--------------+--------------------------------------------+
| Address      | Unknown                                    |
+--------------+--------------------------------------------+
| Phone        | Unavailable                                |
+--------------+--------------------------------------------+
 
 
 
 Support
 
 
+----------------+--------------+---------------------+-----------------+
| Name           | Relationship | Address             | Phone           |
+----------------+--------------+---------------------+-----------------+
| Ada/Ed Radhames | ECON         | [place]              | [phone] |
|                |              | [place]  |                 |
|                |              |  [address]              |                 |
+----------------+--------------+---------------------+-----------------+
 
 
 
 
 Care Team Providers
 
 
+-----------------------+------+-------------+
| Care Team Member Name | Role | Phone       |
+-----------------------+------+-------------+
 PCP  | Unavailable |
+-----------------------+------+-------------+
 
 
 
 Reason for Visit
 
 
+-------------------+----------+
| Reason            | Comments |
+-------------------+----------+
| Medication Refill |          |
+-------------------+----------+
 
 
 
 Encounter Details
 
 
+--------+--------+---------------------+----------------------+-------------------+
| Date   | Type   | Department          | Care Team            | Description       |
+--------+--------+---------------------+----------------------+-------------------+
| / | Refill |   [facility]         |   Marzena Moser  | Medication Refill |
| 2017   |        | NEPHROLOGY  [address]   | M, DO  [address]      |                   |
|        |        | [address]   | [address]      |                   |
|        |        | [place], [place]     | [place], [place]      |                   |
|        |        | [address]          | [address]  [phone]  |                   |
|        |        | [phone]        |  [phone] (Fax)  |                   |
+--------+--------+---------------------+----------------------+-------------------+
 
 
 
 Social History
 
 
+--------------+-------+-----------+--------+------+
| Tobacco Use  | Types | Packs/Day | Years  | Date |
|              |       |           | Used   |      |
+--------------+-------+-----------+--------+------+
| Never Smoker |       |           |        |      |
+--------------+-------+-----------+--------+------+
 
 
 
+---------------------+---+---+---+
| Smokeless Tobacco:  |   |   |   |
| Never Used          |   |   |   |
+---------------------+---+---+---+
 
 
 
+---------------------------------------------------------------+
| Comments: some second hand smoke exposure, but fairly minimal |
 
+---------------------------------------------------------------+
 
 
 
+-------------+-------------+---------+----------+
| Alcohol Use | Drinks/Week | oz/Week | Comments |
+-------------+-------------+---------+----------+
| No          |             |         |          |
+-------------+-------------+---------+----------+
 
 
 
+------------------+---------------+
| Sex Assigned at  | Date Recorded |
| Birth            |               |
+------------------+---------------+
| Not on file      |               |
+------------------+---------------+
 
 
 
+----------------+-------------+-------------+
| Job Start Date | Occupation  | Industry    |
+----------------+-------------+-------------+
| Not on file    | Not on file | Not on file |
+----------------+-------------+-------------+
 
 
 
+----------------+--------------+------------+
| Travel History | Travel Start | Travel End |
+----------------+--------------+------------+
 
 
 
+-------------------------------------+
| No recent travel history available. |
+-------------------------------------+
 documented as of this encounter
 
 Plan of Treatment
 
 
+--------+-----------+------------+----------------------+-------------------+
| Date   | Type      | Specialty  | Care Team            | Description       |
+--------+-----------+------------+----------------------+-------------------+
| / | Office    | Cardiology |   HellalfredoTonia,    |                   |
|    | Visit     |            | ARNP  [address]   |                   |
|        |           |            | St  [place], [place]  |                   |
|        |           |            | [address]  [phone]  |                   |
|        |           |            |  [phone] (Fax)  |                   |
+--------+-----------+------------+----------------------+-------------------+
| / | Hospital  | Radiology  |   Popeye Townsend, |                   |
|    | Encounter |            |  MD  [address] |                   |
|        |           |            |  St.  [place],   |                   |
|        |           |            | [place] [address]             |                   |
|        |           |            | [phone]         |                   |
|        |           |            | [phone] (Fax)   |                   |
+--------+-----------+------------+----------------------+-------------------+
| / | Surgery   | Radiology  |   Popeye Townsend, | CV EP PPM SYSTEM  |
 
|    |           |            |  MD  [address] | IMPLANT           |
|        |           |            |  St.  [place],   |                   |
|        |           |            | [place] [address]             |                   |
|        |           |            | [phone]         |                   |
|        |           |            | [phone] (Fax)   |                   |
+--------+-----------+------------+----------------------+-------------------+
| 02/10/ | Clinical  | Cardiology |                      |                   |
|    | Support   |            |                      |                   |
+--------+-----------+------------+----------------------+-------------------+
| / | Office    | Cardiology |   Tonia Wilson,    |                   |
|    | Visit     |            | ARNP  [address]   |                   |
|        |           |            | [place]  |                   |
|        |           |            | [address]  [phone]  |                   |
|        |           |            |  [phone] (Fax)  |                   |
+--------+-----------+------------+----------------------+-------------------+
| / | Off-Site  | Nephrology |   Marzena Moser  |                   |
|    | Visit     |            | DO ETIENNE  [address] [place] [address]      |                   |
|        |           |            | [address]      |                   |
|        |           |            | [place]      |                   |
|        |           |            | [address]  [phone]  |                   |
|        |           |            |  [phone] (Fax)  |                   |
+--------+-----------+------------+----------------------+-------------------+
 documented as of this encounter
 
 Visit Diagnoses
 Not on filedocumented in this encounter

## 2020-01-08 NOTE — XMS
Encounter Summary
  Created on: 2020
 
 Viviana Ricci
 External Reference #: [id number]
 : 47
 Sex: Female
 
 Demographics
 
 
+-----------------------+----------------------+
| Address               | [address]      |
|                       | [place]  [address] |
+-----------------------+----------------------+
| Home Phone            | [phone]      |
+-----------------------+----------------------+
| Preferred Language    | Unknown              |
+-----------------------+----------------------+
| Marital Status        | Single               |
+-----------------------+----------------------+
| Moravian Affiliation | 1009                 |
+-----------------------+----------------------+
| Race                  | Unknown              |
+-----------------------+----------------------+
| Ethnic Group          | Unknown              |
+-----------------------+----------------------+
 
 
 Author
 
 
+--------------+--------------------------------------------+
| Author       | [facility]  |
|              | and [place]                                |
+--------------+--------------------------------------------+
| Organization | [facility]  |
|              | and [place]                                |
+--------------+--------------------------------------------+
| Address      | Unknown                                    |
+--------------+--------------------------------------------+
| Phone        | Unavailable                                |
+--------------+--------------------------------------------+
 
 
 
 Support
 
 
+----------------+--------------+---------------------+-----------------+
| Name           | Relationship | Address             | Phone           |
+----------------+--------------+---------------------+-----------------+
| Ada/Ed Radhames | ECON         | [address]              | [phone] |
|                |              | [place]  |                 |
|                |              |  [address]              |                 |
+----------------+--------------+---------------------+-----------------+
 
 
 
 
 Care Team Providers
 
 
+-----------------------+------+-------------+
| Care Team Member Name | Role | Phone       |
+-----------------------+------+-------------+
 PCP  | Unavailable |
+-----------------------+------+-------------+
 
 
 
 Encounter Details
 
 
+--------+-----------+----------------------+----------------------+-------------+
| Date   | Type      | Department           | Care Team            | Description |
+--------+-----------+----------------------+----------------------+-------------+
| / | [facility]  |   [facility] |   Marzena Moser  |             |
|    | Encounter |  MED CTR XRAY  [address] | M, DO  [address]      |             |
|        |           |  [place]       | [address]      |             |
|        |           | [place] [address] | [place]      |             |
|        |           |   [phone]       | [address] [phone]  |             |
|        |           |                      |  [phone] (Fax)  |             |
+--------+-----------+----------------------+----------------------+-------------+
 
 
 
 Social History
 
 
+----------------+-------+-----------+--------+------+
| Tobacco Use    | Types | Packs/Day | Years  | Date |
|                |       |           | Used   |      |
+----------------+-------+-----------+--------+------+
| Never Assessed |       |           |        |      |
+----------------+-------+-----------+--------+------+
 
 
 
+------------------+---------------+
| Sex Assigned at  | Date Recorded |
| Birth            |               |
+------------------+---------------+
| Not on file      |               |
+------------------+---------------+
 
 
 
+----------------+-------------+-------------+
| Job Start Date | Occupation  | Industry    |
+----------------+-------------+-------------+
| Not on file    | Not on file | Not on file |
+----------------+-------------+-------------+
 
 
 
+----------------+--------------+------------+
| Travel History | Travel Start | Travel End |
+----------------+--------------+------------+
 
 
 
 
+-------------------------------------+
| No recent travel history available. |
+-------------------------------------+
 documented as of this encounter
 
 Plan of Treatment
 
 
+--------+-----------+------------+----------------------+-------------------+
| Date   | Type      | Specialty  | Care Team            | Description       |
+--------+-----------+------------+----------------------+-------------------+
| / | Office    | Cardiology |   Tonia Wilson,    |                   |
|    | Visit     |            | ARNJESSICA  [address]   |                   |
|        |           |            | St  [place], [place]  |                   |
|        |           |            | [address]  [phone]  |                   |
|        |           |            |  [phone] (Fax)  |                   |
+--------+-----------+------------+----------------------+-------------------+
| / | Hospital  | Radiology  |   Popeye Townsend, |                   |
|    | Encounter |            |  MD  [address] |                   |
|        |           |            |  St. [place],   |                   |
|        |           |            | [place] [address]             |                   |
|        |           |            | [phone]         |                   |
|        |           |            | [phone] (Fax)   |                   |
+--------+-----------+------------+----------------------+-------------------+
| / | Surgery   | Radiology  |   Popeye Townsend, | CV EP PPM SYSTEM  |
|    |           |            |  MD  [address] | IMPLANT           |
|        |           |            |  [place],   |                   |
|        |           |            | [place] [address]             |                   |
|        |           |            | [phone]         |                   |
|        |           |            | [phone] (Fax)   |                   |
+--------+-----------+------------+----------------------+-------------------+
| 02/10/ | Clinical  | Cardiology |                      |                   |
|    | Support   |            |                      |                   |
+--------+-----------+------------+----------------------+-------------------+
| / | Office    | Cardiology |   Tonia Wilson,    |                   |
|    | Visit     |            | BELKIS  [address]   |                   |
|        |           |            | [place]  |                   |
|        |           |            | [address]  [phone]  |                   |
|        |           |            |  [phone] (Fax)  |                   |
+--------+-----------+------------+----------------------+-------------------+
| / | Off-Site  | Nephrology |   Marzena Moser  |                   |
|    | Visit     |            | DO ETIENNE  [address] [place] [address]      |                   |
|        |           |            | [address]      |                   |
|        |           |            | [place]      |                   |
|        |           |            | [address] [phone]  |                   |
|        |           |            |  [phone] (Fax)  |                   |
+--------+-----------+------------+----------------------+-------------------+
 documented as of this encounter
 
 Visit Diagnoses
 Not on filedocumented in this encounter

## 2020-01-08 NOTE — XMS
Encounter Summary
  Created on: 2020
 
 Viviana Ricci
 External Reference #: [id number]
 : 47
 Sex: Female
 
 Demographics
 
 
+-----------------------+----------------------+
| Address               | [address]      |
|                       | [place]  [address] |
+-----------------------+----------------------+
| Home Phone            | [phone]      |
+-----------------------+----------------------+
| Preferred Language    | Unknown              |
+-----------------------+----------------------+
| Marital Status        | Single               |
+-----------------------+----------------------+
| Worship Affiliation | [id number]                 |
+-----------------------+----------------------+
| Race                  | Unknown              |
+-----------------------+----------------------+
| Ethnic Group          | Unknown              |
+-----------------------+----------------------+
 
 
 Author
 
 
+--------------+--------------------------------------------+
| Author       | [facility]  |
|              | and [facility]                                |
+--------------+--------------------------------------------+
| Organization | [facility]  |
|              | and [facility]                                |
+--------------+--------------------------------------------+
| Address      | Unknown                                    |
+--------------+--------------------------------------------+
| Phone        | Unavailable                                |
+--------------+--------------------------------------------+
 
 
 
 Support
 
 
+----------------+--------------+---------------------+-----------------+
| Name           | Relationship | Address             | Phone           |
+----------------+--------------+---------------------+-----------------+
| Ada/Ed Radhames | ECON         | [address]              | [phone] |
|                |              | [place]  |                 |
|                |              |  [address]              |                 |
+----------------+--------------+---------------------+-----------------+
 
 
 
 
 Care Team Providers
 
 
+------------------------+------+-----------------+
| Care Team Member Name  | Role | Phone           |
+------------------------+------+-----------------+
| Marzena Moser DO | PCP  | [phone] |
+------------------------+------+-----------------+
 
 
 
 Reason for Visit
 
 
+-------------------+----------+
| Reason            | Comments |
+-------------------+----------+
| Medication Refill |          |
+-------------------+----------+
 
 
 
 Encounter Details
 
 
+--------+--------+---------------------+----------------------+-------------------+
| Date   | Type   | Department          | Care Team            | Description       |
+--------+--------+---------------------+----------------------+-------------------+
| / | Refill |   [facility]         |   Marzena Moser  | Medication Refill |
| 2018   |        | NEPHROLOGY  [address]   | M, DO  [address]      |                   |
|        |        | [address]   | [address]      |                   |
|        |        | [place], [place]     | [place], [place]      |                   |
|        |        | [address]          | [address]  [phone]  |                   |
|        |        | [phone]        |  [phone] (Fax)  |                   |
+--------+--------+---------------------+----------------------+-------------------+
 
 
 
 Social History
 
 
+--------------+-------+-----------+--------+------+
| Tobacco Use  | Types | Packs/Day | Years  | Date |
|              |       |           | Used   |      |
+--------------+-------+-----------+--------+------+
| Never Smoker |       |           |        |      |
+--------------+-------+-----------+--------+------+
 
 
 
+---------------------+---+---+---+
| Smokeless Tobacco:  |   |   |   |
| Never Used          |   |   |   |
+---------------------+---+---+---+
 
 
 
+---------------------------------------------------------------+
| Comments: some second hand smoke exposure, but fairly minimal |
 
+---------------------------------------------------------------+
 
 
 
+-------------+-------------+---------+----------+
| Alcohol Use | Drinks/Week | oz/Week | Comments |
+-------------+-------------+---------+----------+
| No          |             |         |          |
+-------------+-------------+---------+----------+
 
 
 
+------------------+---------------+
| Sex Assigned at  | Date Recorded |
| Birth            |               |
+------------------+---------------+
| Not on file      |               |
+------------------+---------------+
 
 
 
+----------------+-------------+-------------+
| Job Start Date | Occupation  | Industry    |
+----------------+-------------+-------------+
| Not on file    | Not on file | Not on file |
+----------------+-------------+-------------+
 
 
 
+----------------+--------------+------------+
| Travel History | Travel Start | Travel End |
+----------------+--------------+------------+
 
 
 
+-------------------------------------+
| No recent travel history available. |
+-------------------------------------+
 documented as of this encounter
 
 Plan of Treatment
 
 
+--------+-----------+------------+----------------------+-------------------+
| Date   | Type      | Specialty  | Care Team            | Description       |
+--------+-----------+------------+----------------------+-------------------+
| / | Office    | Cardiology |   Tonia Wilson,    |                   |
|    | Visit     |            | ARNP  [address]   |                   |
|        |           |            | [place], [place]  |                   |
|        |           |            | [address]  [phone]  |                   |
|        |           |            |  [phone] (Fax)  |                   |
+--------+-----------+------------+----------------------+-------------------+
| / | Hospital  | Radiology  |   Popeye Townsend, |                   |
|    | Encounter |            |  MD  [address] |                   |
|        |           |            |  [place],   |                   |
|        |           |            | [place] [address]             |                   |
|        |           |            | [phone]         |                   |
|        |           |            | [phone] (Fax)   |                   |
+--------+-----------+------------+----------------------+-------------------+
| / | Surgery   | Radiology  |   Popeye Townsend, | CV EP PPM SYSTEM  |
 
|    |           |            |  MD  [address] | IMPLANT           |
|        |           |            |  [place],   |                   |
|        |           |            | [place] [address]             |                   |
|        |           |            | [phone]         |                   |
|        |           |            | [phone] (Fax)   |                   |
+--------+-----------+------------+----------------------+-------------------+
| 02/10/ | Clinical  | Cardiology |                      |                   |
|    | Support   |            |                      |                   |
+--------+-----------+------------+----------------------+-------------------+
| / | Office    | Cardiology |   RakeshTonia andre,    |                   |
|    | Visit     |            | ARNP  [address]   |                   |
|        |           |            | [place]  |                   |
|        |           |            | [address] [phone]  |                   |
|        |           |            |  [phone] (Fax)  |                   |
+--------+-----------+------------+----------------------+-------------------+
| / | Off-Site  | Nephrology |   Marzena Moser  |                   |
|    | Visit     |            | DO ETIENNE  [address] [place] [address]      |                   |
|        |           |            | [address]      |                   |
|        |           |            | [place]      |                   |
|        |           |            | [address] [phone]  |                   |
|        |           |            |  [phone] (Fax)  |                   |
+--------+-----------+------------+----------------------+-------------------+
 documented as of this encounter
 
 Visit Diagnoses
 Not on filedocumented in this encounter

## 2020-01-08 NOTE — XMS
Encounter Summary
  Created on: 2020
 
 Ras Ricci
 External Reference #: [id number]
 : 47
 Sex: Female
 
 Demographics
 
 
+-----------------------+----------------------+
| Address               | [address]      |
|                       | [place]  [address] |
+-----------------------+----------------------+
| Home Phone            | [phone]      |
+-----------------------+----------------------+
| Preferred Language    | Unknown              |
+-----------------------+----------------------+
| Marital Status        | Single               |
+-----------------------+----------------------+
| Bahai Affiliation | [id number]                 |
+-----------------------+----------------------+
| Race                  | Unknown              |
+-----------------------+----------------------+
| Ethnic Group          | Unknown              |
+-----------------------+----------------------+
 
 
 Author
 
 
+--------------+--------------------------------------------+
| Author       | [facility] [place]  |
|              | and [place]                                |
+--------------+--------------------------------------------+
| Organization | [facility] [place]  |
|              | and [place]                                |
+--------------+--------------------------------------------+
| Address      | Unknown                                    |
+--------------+--------------------------------------------+
| Phone        | Unavailable                                |
+--------------+--------------------------------------------+
 
 
 
 Support
 
 
+----------------+--------------+---------------------+-----------------+
| Name           | Relationship | Address             | Phone           |
+----------------+--------------+---------------------+-----------------+
| Ada/Ed Radhames | ECON         | [address]              | [phone] |
|                |              | [place]  |                 |
|                |              |  [address]              |                 |
+----------------+--------------+---------------------+-----------------+
 
 
 
 
 Care Team Providers
 
 
+-----------------------+------+-------------+
| Care Team Member Name | Role | Phone       |
+-----------------------+------+-------------+
 PCP  | Unavailable |
+-----------------------+------+-------------+
 
 
 
 Encounter Details
 
 
+--------+-----------+----------------------+----------------------+-------------+
| Date   | Type      | Department           | Care Team            | Description |
+--------+-----------+----------------------+----------------------+-------------+
| / | Hospital  |   [facility]     |   [place],        |             |
|    | Encounter | CONVERSION [address]     |             |
|        |           | [address]           | [address]            |             |
|        |           | [place]         | [place] [address]  |             |
|        |           | [address]           |  [phone]        |             |
|        |           | [phone]         | [phone] (Fax)   |             |
+--------+-----------+----------------------+----------------------+-------------+
 
 
 
 Social History
 
 
+----------------+-------+-----------+--------+------+
| Tobacco Use    | Types | Packs/Day | Years  | Date |
|                |       |           | Used   |      |
+----------------+-------+-----------+--------+------+
| Never Assessed |       |           |        |      |
+----------------+-------+-----------+--------+------+
 
 
 
+------------------+---------------+
| Sex Assigned at  | Date Recorded |
| Birth            |               |
+------------------+---------------+
| Not on file      |               |
+------------------+---------------+
 
 
 
+----------------+-------------+-------------+
| Job Start Date | Occupation  | Industry    |
+----------------+-------------+-------------+
| Not on file    | Not on file | Not on file |
+----------------+-------------+-------------+
 
 
 
+----------------+--------------+------------+
| Travel History | Travel Start | Travel End |
+----------------+--------------+------------+
 
 
 
 
+-------------------------------------+
| No recent travel history available. |
+-------------------------------------+
 documented as of this encounter
 
 Plan of Treatment
 
 
+--------+-----------+------------+----------------------+-------------------+
| Date   | Type      | Specialty  | Care Team            | Description       |
+--------+-----------+------------+----------------------+-------------------+
| / | Office    | Cardiology |   Tonia Wilson,    |                   |
|    | Visit     |            | ARNP  [address]   |                   |
|        |           |            | [place], [place]  |                   |
|        |           |            | [address]  [phone]  |                   |
|        |           |            |  [phone] (Fax)  |                   |
+--------+-----------+------------+----------------------+-------------------+
| / | Hospital  | Radiology  |   Popeye Townsend, |                   |
|    | Encounter |            |  MD  [address] |                   |
|        |           |            |  [place],   |                   |
|        |           |            | [place] [address]             |                   |
|        |           |            | [phone]         |                   |
|        |           |            | [phone] (Fax)   |                   |
+--------+-----------+------------+----------------------+-------------------+
| / | Surgery   | Radiology  |   Popeye Townsend, | CV EP PPM SYSTEM  |
|    |           |            |  MD  [address] | IMPLANT           |
|        |           |            |  [place],   |                   |
|        |           |            | [place] [address]             |                   |
|        |           |            | [phone]         |                   |
|        |           |            | [phone] (Fax)   |                   |
+--------+-----------+------------+----------------------+-------------------+
| 02/10/ | Clinical  | Cardiology |                      |                   |
|    | Support   |            |                      |                   |
+--------+-----------+------------+----------------------+-------------------+
| / | Office    | Cardiology |   Tonia Wilson,    |                   |
|    | Visit     |            | [facility]  [address]   |                   |
|        |           |            |   [place]  |                   |
|        |           |            | [address]  [phone]  |                   |
|        |           |            |  [phone] (Fax)  |                   |
+--------+-----------+------------+----------------------+-------------------+
| / | Off-Site  | Nephrology |   Natalee Williamson  |                   |
|    | Visit     |            | DO ETIENNE  [address] [place] [address]      |                   |
|        |           |            | [address]      |                   |
|        |           |            | [place]      |                   |
|        |           |            | [address]  [phone]  |                   |
|        |           |            |  [phone] (Fax)  |                   |
+--------+-----------+------------+----------------------+-------------------+
 documented as of this encounter
 
 Procedures
 
 
+---------------------+--------+-------------+----------------------+----------------------+
| Procedure Name      | Priori | Date/Time   | Associated Diagnosis | Comments             |
|                     | ty     |             |                      |                      |
+---------------------+--------+-------------+----------------------+----------------------+
| MRI BRAIN WO        | Routin | 2009  |                      |   Results for this   |
 
| CONTRAST ANGIOGRAM  | e      |  1:23 PM    |                      | procedure are in the |
| HEAD WO CONTRAST    |        | PST         |                      |  results section.    |
+---------------------+--------+-------------+----------------------+----------------------+
 documented in this encounter
 
 Results
 MRI Brain Wo MRA Head Wo (2009  1:23 PM PST)
 
+----------+
| Specimen |
+----------+
|          |
+----------+
 
 
 
+------------------------------------------------------------------------+--------------+
| Narrative                                                              | Performed At |
+------------------------------------------------------------------------+--------------+
|      [id number]                                                           |              |
|            Page   1  RADIOLOGY                                         |              |
|                           /                                            |              |
|                                        O/P  [facility]      |              |
|               [place] [address]                |              |
|        MEDICAL RECORD #:   [id number]  [phone]                 |              |
|          ACCOUNT #:   [id number]  [phone]                    |              |
|      YOB: 1945     ORDER NUMBER:   [id number]  EXAM     |              |
| DATE/TIME:   2009 12:15 P  ORDERING PHYSICIAN:   [place],     |              |
| REBEL GARCIA  ORDER DETAIL:   830 /   / HMR  EXAM DESCRIPTION: MRI BRAIN |              |
|  AND MRA HEAD UN                                                       |              |
| ____________________________________________________________________   |              |
| MRI AND MR ANGIOGRAPHY OF THE BRAIN NONCONTRAST 2009     HISTORY |              |
|   A 64-year-old female with involuntary motion of the neck.     The    |              |
| patient also has a history of hypertension and diabetes. The patient   |              |
| has had a prior kidney transplant.     COMPARISON  No prior exam.      |              |
| TECHNIQUE  Using a 1.5 Marichuy magnet, diffusion, axial FLAIR, axial T2, |              |
|  axial T1,  axial GRE, and sagittal FLAIR imaging was performed.       |              |
| Time-of-flight MR  angiography was then performed with MIP reformation |              |
|  imaging.     FINDINGS  Diffusion imaging is normal, thereby excluding |              |
|  acute or subacute  cerebral infarction. Gray and white                |              |
| cerebral/cerebellar/brainstem matter  show normal intensity and        |              |
| demarcation. No demyelinating disease or  lacunar infarctions. No      |              |
| ischemic gliosis. No cerebral edema, mass,  intracranial hemorrhage,   |              |
| or extraaxial fluid collection can be seen. The  sagittal images show  |              |
| a normal pituitary gland, aqueduct of Sylvius, and  foramen magnum.    |              |
| The corpus callosum is normal. Gradient echo axial  images fail to     |              |
| show any hemosiderin in the central white matter or basal  ganglia.    |              |
|   Internal auditory canals show normal caliber and CSF content. The    |              |
| cerebellopontine angles are clear. The nasopharyngeal airway is open.  |              |
|  The orbits are unremarkable.     The paranasal sinuses and mastoid    |              |
| air cells are clear.     MR ANGIOGRAPHY  The left posterior            |              |
| communicating artery is hypoplastic and the right  posterior           |              |
| communicating artery is aplastic.     There are mild irregularities of |              |
|  the M1 segments of both the right and  left middle cerebral arteries, |              |
|  due to "standing waves", an artifact.     The posterior right and     |              |
| left cerebral arteries show rapid tapering as  they course around the  |              |
| brainstem. This is probably artifactual due to  slow flow. The distal  |              |
| vertebral arteries are asymmetrical in size, the  left greater than    |              |
| the right. The basilar artery is widely patent. The  distal internal   |              |
| carotid arteries are widely patent.     No obvious aneurysm or         |              |
 
| stenosis visualized. No obvious arteriovenous  malformation.           |              |
| CONCLUSIONS  1. Normal MRI of the brain.  2. Anatomical variation of   |              |
| the [place]. Otherwise unremarkable      MR angiography of    |              |
| the brain.        Read by  RYLAN PARK MD 2009 01:37 P    |              |
| Electronically Signed by  RYLAN PARK MD 2009 11:56 A     |              |
|  DD:    2009 01:37 P  DT:    2009 11:42 A                  |              |
| JAQUELIN/justen/[id number]/  cc:    MD REBEL QUINTANA     |              |
| MD RYLAN LYNCH MD                         |              |
+------------------------------------------------------------------------+--------------+
 
 
 
+--------------------------------------------------------------------------+
| Procedure Note                                                           |
+--------------------------------------------------------------------------+
|   Torin Arambula - 2019  9:55 PM PDT                          |
| [id number]                                              Page  1             |
| RADIOLOGY                                            /                   |
|                                                      O/P                 |
| [facility]            NAME:  RAS RICCI                    |
| [place] [address]               MEDICAL RECORD #:  [id number]            |
| [phone]                 ACCOUNT #:  [id number]                  |
| [phone]                YOB: 1945              |
|                                                                          |
| ORDER NUMBER:  [id number]                                                   |
| EXAM DATE/TIME:  2009 12:15 P                                      |
| ORDERING PHYSICIAN:  REBEL LYNCH                              |
| ORDER DETAIL:  830 /  / HMR                                              |
| EXAM DESCRIPTION: MRI BRAIN AND MRA HEAD UN                              |
| ____________________________________________________________________     |
| MRI AND MR ANGIOGRAPHY OF THE BRAIN NONCONTRAST 2009               |
|                                                                          |
| HISTORY                                                                  |
| A 64-year-old female with involuntary motion of the neck.                |
|                                                                          |
| The patient also has a history of hypertension and diabetes. The patient |
| has had a prior kidney transplant.                                       |
|                                                                          |
| COMPARISON                                                               |
| No prior exam.                                                           |
|                                                                          |
| TECHNIQUE                                                                |
| Using a 1.5 Marichuy magnet, diffusion, axial FLAIR, axial T2, axial T1,    |
| axial GRE, and sagittal FLAIR imaging was performed. Time-of-flight MR   |
| angiography was then performed with MIP reformation imaging.             |
|                                                                          |
| FINDINGS                                                                 |
| Diffusion imaging is normal, thereby excluding acute or subacute         |
| cerebral infarction. Gray and white cerebral/cerebellar/brainstem matter |
| show normal intensity and demarcation. No demyelinating disease or       |
| lacunar infarctions. No ischemic gliosis. No cerebral edema, mass,       |
| intracranial hemorrhage, or extraaxial fluid collection can be seen. The |
| sagittal images show a normal pituitary gland, aqueduct of Sylvius, and  |
| foramen magnum. The corpus callosum is normal. Gradient echo axial       |
| images fail to show any hemosiderin in the central white matter or basal |
| ganglia.                                                                 |
|                                                                          |
| Internal auditory canals show normal caliber and CSF content. The        |
| cerebellopontine angles are clear. The nasopharyngeal airway is open.    |
| The orbits are unremarkable.                                             |
 
|                                                                          |
| The paranasal sinuses and mastoid air cells are clear.                   |
|                                                                          |
| MR ANGIOGRAPHY                                                           |
| The left posterior communicating artery is hypoplastic and the right     |
| posterior communicating artery is aplastic.                              |
|                                                                          |
| There are mild irregularities of the M1 segments of both the right and   |
| left middle cerebral arteries, due to "standing waves", an artifact.     |
|                                                                          |
| The posterior right and left cerebral arteries show rapid tapering as    |
| they course around the brainstem. This is probably artifactual due to    |
| slow flow. The distal vertebral arteries are asymmetrical in size, the   |
| left greater than the right. The basilar artery is widely patent. The    |
| distal internal carotid arteries are widely patent.                      |
|                                                                          |
| No obvious aneurysm or stenosis visualized. No obvious arteriovenous     |
| malformation.                                                            |
|                                                                          |
| CONCLUSIONS                                                              |
| 1. Normal MRI of the brain.                                              |
| 2. Anatomical variation of the [place]. Otherwise unremarkable  |
|    MR angiography of the brain.                                          |
|                                                                          |
|                                                                          |
| Read by                                                                  |
| RYLAN PARK MD 2009 01:37 P                                 |
| Electronically Signed by                                                 |
| RYLAN PARK MD 2009 11:56 A                                 |
|                                                                          |
| DD:   2009 01:37 P                                                 |
| DT:   2009 11:42 A                                                 |
| [facility]/[id number]/                                                         |
| cc:   NATALEE WILLIAMSON MD                                               |
|       REBEL LYNCH MD                                          |
|       RYLAN PARK MD                                              |
+--------------------------------------------------------------------------+
 documented in this encounter
 
 Visit Diagnoses
 Not on filedocumented in this encounter

## 2020-01-08 NOTE — XMS
Encounter Summary
  Created on: 2020
 
 Ras Ricci
 External Reference #: [id number]
 : 47
 Sex: Female
 
 Demographics
 
 
+-----------------------+----------------------+
| Address               | [address]      |
|                       | [place]  [address] |
+-----------------------+----------------------+
| Home Phone            | [phone]      |
+-----------------------+----------------------+
| Preferred Language    | Unknown              |
+-----------------------+----------------------+
| Marital Status        | Single               |
+-----------------------+----------------------+
| Anabaptist Affiliation | [id number]                 |
+-----------------------+----------------------+
| Race                  | Unknown              |
+-----------------------+----------------------+
| Ethnic Group          | Unknown              |
+-----------------------+----------------------+
 
 
 Author
 
 
+--------------+--------------------------------------------+
| Author       | [facility]  |
|              | and [facility]                                |
+--------------+--------------------------------------------+
| Organization | [facility]  |
|              | and [facility]                                |
+--------------+--------------------------------------------+
| Address      | Unknown                                    |
+--------------+--------------------------------------------+
| Phone        | Unavailable                                |
+--------------+--------------------------------------------+
 
 
 
 Support
 
 
+----------------+--------------+---------------------+-----------------+
| Name           | Relationship | Address             | Phone           |
+----------------+--------------+---------------------+-----------------+
| Ada/Ed Radhames | ECON         | [address]              | [phone] |
|                |              | [place]  |                 |
|                |              |  [address]              |                 |
+----------------+--------------+---------------------+-----------------+
 
 
 
 
 Care Team Providers
 
 
+-----------------------+------+-------------+
| Care Team Member Name | Role | Phone       |
+-----------------------+------+-------------+
 PCP  | Unavailable |
+-----------------------+------+-------------+
 
 
 
 Reason for Visit
 
 
+---------------------+---------------+
| Reason              | Comments      |
+---------------------+---------------+
| Cough               | Rm 1 x 5 days |
+---------------------+---------------+
| Wheezing            |               |
+---------------------+---------------+
| Shortness of Breath |               |
+---------------------+---------------+
 
 
 
 Encounter Details
 
 
+--------+---------+----------------------+----------------------+--------------------+
| Date   | Type    | Department           | Care Team            | Description        |
+--------+---------+----------------------+----------------------+--------------------+
| / | Office  |   [facility]          |   Abdiel Mireles    | Cough (Primary Dx) |
|    | Visit   | CONVENIENT CARE  [address] | Garry Greenfield MD      |                    |
|        |         |  [facility] | [address]       |                    |
|        |         |  [place]           | [place]      |                    |
|        |         | [address]           | [address] [phone]  |                    |
|        |         | [phone]         |  [phone] (Fax)  |                    |
+--------+---------+----------------------+----------------------+--------------------+
 
 
 
 Social History
 
 
+--------------+-------+-----------+--------+------+
| Tobacco Use  | Types | Packs/Day | Years  | Date |
|              |       |           | Used   |      |
+--------------+-------+-----------+--------+------+
| Never Smoker |       |           |        |      |
+--------------+-------+-----------+--------+------+
 
 
 
+---------------------+---+---+---+
| Smokeless Tobacco:  |   |   |   |
| Never Used          |   |   |   |
+---------------------+---+---+---+
 
 
 
 
+-------------+-------------+---------+----------+
| Alcohol Use | Drinks/Week | oz/Week | Comments |
+-------------+-------------+---------+----------+
| No          |             |         |          |
+-------------+-------------+---------+----------+
 
 
 
+------------------+---------------+
| Sex Assigned at  | Date Recorded |
| Birth            |               |
+------------------+---------------+
| Not on file      |               |
+------------------+---------------+
 
 
 
+----------------+-------------+-------------+
| Job Start Date | Occupation  | Industry    |
+----------------+-------------+-------------+
| Not on file    | Not on file | Not on file |
+----------------+-------------+-------------+
 
 
 
+----------------+--------------+------------+
| Travel History | Travel Start | Travel End |
+----------------+--------------+------------+
 
 
 
+-------------------------------------+
| No recent travel history available. |
+-------------------------------------+
 documented as of this encounter
 
 Last Filed Vital Signs
 
 
+-------------------+---------------------+----------------------+----------+
| Vital Sign        | Reading             | Time Taken           | Comments |
+-------------------+---------------------+----------------------+----------+
| Blood Pressure    | 118/78              | 2013  8:22 AM  |          |
|                   |                     | PST                  |          |
+-------------------+---------------------+----------------------+----------+
| Pulse             | 73                  | 2013  8:22 AM  |          |
|                   |                     | PST                  |          |
+-------------------+---------------------+----------------------+----------+
| Temperature       | 36.2   C (97.1   F) | 2013  8:22 AM  |          |
|                   |                     | PST                  |          |
+-------------------+---------------------+----------------------+----------+
| Respiratory Rate  | 16                  | 2013  8:22 AM  |          |
|                   |                     | PST                  |          |
+-------------------+---------------------+----------------------+----------+
| Oxygen Saturation | 98%                 | 2013  8:22 AM  |          |
|                   |                     | PST                  |          |
+-------------------+---------------------+----------------------+----------+
| Inhaled Oxygen    | -                   | -                    |          |
 
| Concentration     |                     |                      |          |
+-------------------+---------------------+----------------------+----------+
| Weight            | 129.7 kg (286 lb)   | 2013  8:22 AM  |          |
|                   |                     | PST                  |          |
+-------------------+---------------------+----------------------+----------+
| Height            | 167.6 cm (5' 6")    | 2013  8:22 AM  |          |
|                   |                     | PST                  |          |
+-------------------+---------------------+----------------------+----------+
| Body Mass Index   | 46.16               | 2013  8:22 AM  |          |
|                   |                     | PST                  |          |
+-------------------+---------------------+----------------------+----------+
 documented in this encounter
 
 Patient Instructions
 Patient Instructions Abdiel Mireles Jr., MD - 2013  9:53 AM PSTFollow-up wit
h your doctor if not improving in 5 days
 Take medication as directed
 Increase fluid intake
 Recheck sooner, in the clinic, with your doctor or in the ER, if your symptoms worsen or ne
w problems develop Electronically signed by Abdiel Mireles Jr., MD at 2013  9:
53 AM PST
 documented in this encounter
 
 Progress Notes
 Pepito Tejada RN - 2013 10:11 AM PSTalbuterol 2.5 mg/3 mL nebulizer solution 2.
5 mg given to patient. Patient stated no improvement after treatment given.Dr Mireles notif
ied. 
 Electronically signed by Pepito Piper RN at 2013 10:47 AM Abdiel Jaffe Jr., MD - 2013  8:45 AM PSTGaelizabeth Ricci presents with a history of type II diabete
s and a kidney transplant.  Her transplant apparently has been well controlled as far as rej
ection and she states that her kidney function has been good.  Her blood sugars have been sl
ightly high lately.  Last  she developed a cough was treated with 3 courses of antibioti
c with a diagnosis of bronchitis. This eventually resolved at the end of December.  Througho
 she has been cough free.  She now presents with a one-week history of recurrent c
ough which has been mainly nonproductive.  There's been no fever.  She denies sore throat ru
nny nose or other symptoms.  She denies ever having had a pneumonia vaccine.  She states deshaun
t she it is hard for her to sleep because of the noise in her chest.  She denies a history o
f asthma.  No recent history of GERD.  No recent problems with postnasal drainage
 
 Exam: No respiratory distress
 Nose: clear
 Ears:  TM's  not inflamed
 Throat: erythema, no exudate
 Neck:  Supple, no stridor, no adenopathy
 Chest:  No rales, no rhonchi, no wheezes, good breath sounds bilaterally
 Heart:  Regular rhythm, no murmur, no gallop, no rub
  
 CXR:  Prior surgery, scoliosis, no infiltrate
 
 Diagnosis:  Bronchitis, rule out occult asthmaElectronically signed by Abdiel Reyes ms, Jr., MD at 2013 10:47 AM PSTdocumented in this encounter
 
 Plan of Treatment
 
 
+--------+-----------+------------+----------------------+-------------------+
| Date   | Type      | Specialty  | Care Team            | Description       |
+--------+-----------+------------+----------------------+-------------------+
| / | Office    | Cardiology |   Tonia Wilson,    |                   |
2020   | Visit     |            | ARNP  [address]   |                   |
 
|        |           |            | [place], [place]  |                   |
|        |           |            | [address]  [phone]  |                   |
|        |           |            |  [phone] (Fax)  |                   |
+--------+-----------+------------+----------------------+-------------------+
| / | Hospital  | Radiology  |   Popeye Townsend, |                   |
|    | Encounter |            |  MD  [address] |                   |
|        |           |            |  St.  [place],   |                   |
|        |           |            | [place] [address]             |                   |
|        |           |            | [phone]         |                   |
|        |           |            | [phone] (Fax)   |                   |
+--------+-----------+------------+----------------------+-------------------+
| / | Surgery   | Radiology  |   Popeye Townsend, | CV EP PPM SYSTEM  |
|    |           |            |  MD  [address] | IMPLANT           |
|        |           |            |  St.  [place],   |                   |
|        |           |            | [place] [address]             |                   |
|        |           |            | [phone]         |                   |
|        |           |            | [phone] (Fax)   |                   |
+--------+-----------+------------+----------------------+-------------------+
| 02/10/ | Clinical  | Cardiology |                      |                   |
|    | Support   |            |                      |                   |
+--------+-----------+------------+----------------------+-------------------+
| / | Office    | Cardiology |   Tonia Wilson,    |                   |
|    | Visit     |            | [facility]  [address]   |                   |
|        |           |            | [place]  |                   |
|        |           |            | [address]  [phone]  |                   |
|        |           |            |  [phone] (Fax)  |                   |
+--------+-----------+------------+----------------------+-------------------+
| / | Off-Site  | Nephrology |   Marzena Moser  |                   |
2020   | Visit     |            | DO [facility]  [address] [place] [address]      |                   |
|        |           |            | [address]      |                   |
|        |           |            | [place]      |                   |
|        |           |            | [address]  [phone]  |                   |
|        |           |            |  [phone] (Fax)  |                   |
+--------+-----------+------------+----------------------+-------------------+
 
 
 
+------------------+---------+--------+----------------------+---------------------+
| Name             | Type    | Priori | Associated Diagnoses | Order Schedule      |
|                  |         | ty     |                      |                     |
+------------------+---------+--------+----------------------+---------------------+
| XR Chest PA and  | Imaging | Routin |   Cough              | Ordered: 2013 |
| Lateral          |         | e      |                      |                     |
+------------------+---------+--------+----------------------+---------------------+
 documented as of this encounter
 
 Results
 XR Chest PA and Lateral (2013 10:54 AM PST)
 
+----------+
| Specimen |
+----------+
|          |
+----------+
 
 
 
+------------------------------------------------------------------------+-----------------+
| Narrative                                                              | Performed At    |
+------------------------------------------------------------------------+-----------------+
 
|    [facility]      Diagnostic Imaging          |   [place]    |
| Department      [address] [place]      [phone]    | [facility]        |
|                             [   rep ct [address]]     [   rep ct [facility]  |
| st zip]     **** Signed ****                                           | - IMAGING       |
| ______________________________________________________________________ |                 |
| ______________________     Patient Name: RAS RICCI   Physician:     |                 |
| .02     : 1945    Age: 67   Sex: F     Unit #: [id number]    |                 |
|   Exam Date: 13     Acct #: [id number]   Location: [facility]     |                 |
|     Report #: [id number]                      Page:                     |                 |
|   %(RAD)RES..mtdd.print.filter("pg") of   %(RAD)                       |                 |
| RES..mtdd.print.filter("tpg")                                          |                 |
| ______________________________________________________________________ |                 |
| ______________________     Accession Number: [id number]                |                 |
| TWO VIEW CHEST              CLINICAL HISTORY:   COUGH.                 |                 |
| COMPARISON:   2008.              FINDINGS:   There is    |                 |
| stable mild cardiomegaly.   Sternal wires are in unchanged position.   |                 |
|   Aorta shows   intimal calcifications but a stable appearance.        |                 |
|   Pulmonary vasculature is somewhat prominent centrally   but also     |                 |
| unchanged.              No focal areas of abnormal lung density are    |                 |
| seen.   The patient is somewhat rotated on this exam.   No   acute     |                 |
| bony abnormalities.              IMPRESSION:       1. POSTOP           |                 |
| STERNOTOMY WITH STABLE CARDIOMEGALY. NO ACUTE CARDIOPULMONARY          |                 |
| ABNORMALITY.              Dictated Date/Time:   2013 10:54       |                 |
| Transcribed Date/Time:   2013 11:03      :       |                 |
|                         <<Signature on File>>                     |                 |
| -----------------------------------------------------------            |                 |
| Jon Marcus      <Electronically signed by       |                 |
| Jon Marcus MD>               Jon Marcus MD 13     |                 |
| 1054     : goAct [id number]/14/13 1103          |                 |
|       Abdiel Mireles Jr, MD                                         |                 |
+------------------------------------------------------------------------+-----------------+
 
 
 
+----------------------+---------------------+--------------------+----------------+
| Performing           | Address             | City/State/Zipcode | Phone Number   |
| Organization         |                     |                    |                |
+----------------------+---------------------+--------------------+----------------+
|   [facility]     |   [address] | [place]    |   [phone] |
| [facility]  |                     | [address]              |                |
| - IMAGING            |                     |                    |                |
+----------------------+---------------------+--------------------+----------------+
 documented in this encounter
 
 Visit Diagnoses
 
 
+-------------------+
| Diagnosis         |
+-------------------+
|   Cough - Primary |
+-------------------+
 documented in this encounter
 
 Administered Medications
 
 
+-----------------------------------+--------+----------+--------+------+------+
| Medication Order                  | MAR    | Action   | Dose   | Rate | Site |
|                                   | Action | Date     |        |      |      |
 
+-----------------------------------+--------+----------+--------+------+------+
|   albuterol 2.5 mg/3 mL nebulizer | Given  | 20 | 2.5 mg |      |      |
|  solution 2.5 mg  2.5 mg,         |        | 13  9:59 |        |      |      |
| Nebulization, ONCE, Thu 13   |        |  AM PST  |        |      |      |
| at 1015, For 1 dose, Clinic       |        |          |        |      |      |
| administered,                     |        |          |        |      |      |
+-----------------------------------+--------+----------+--------+------+------+
 
 
 
+---+---+
|   |   |
+---+---+
 documented in this encounter

## 2020-01-08 NOTE — XMS
Encounter Summary
  Created on: 2020
 
 Viviana Ricci
 External Reference #: [id number]
 : 47
 Sex: Female
 
 Demographics
 
 
+-----------------------+----------------------+
| Address               | [address]      |
|                       | [place]  [address] |
+-----------------------+----------------------+
| Home Phone            | [phone]      |
+-----------------------+----------------------+
| Preferred Language    | Unknown              |
+-----------------------+----------------------+
| Marital Status        | Single               |
+-----------------------+----------------------+
| Mor[employer] Affiliation | [id number]                 |
+-----------------------+----------------------+
| Race                  | Unknown              |
+-----------------------+----------------------+
| Ethnic Group          | Unknown              |
+-----------------------+----------------------+
 
 
 Author
 
 
+--------------+--------------------------------------------+
| Author       | [facility]  |
|              | and [place]                                |
+--------------+--------------------------------------------+
| Organization | [facility]  |
|              | and [place]                                |
+--------------+--------------------------------------------+
| Address      | Unknown                                    |
+--------------+--------------------------------------------+
| Phone        | Unavailable                                |
+--------------+--------------------------------------------+
 
 
 
 Support
 
 
+----------------+--------------+---------------------+-----------------+
| Name           | Relationship | Address             | Phone           |
+----------------+--------------+---------------------+-----------------+
| Ada/Ed Radhames | ECON         | [address]              | [phone] |
|                |              | [place]  |                 |
|                |              |  [address]              |                 |
+----------------+--------------+---------------------+-----------------+
 
 
 
 
 Care Team Providers
 
 
+-----------------------+------+-------------+
| Care Team Member Name | Role | Phone       |
+-----------------------+------+-------------+
 PCP  | Unavailable |
+-----------------------+------+-------------+
 
 
 
 Encounter Details
 
 
+--------+----------+---------------------+----------------------+-------------+
| Date   | Type     | Department          | Care Team            | Description |
+--------+----------+---------------------+----------------------+-------------+
| / | Abstract |   [facility] [place]         |   Marzena Moser  |             |
|    |          | NEPHROLOGY  [address]   | M, DO  [address]      |             |
|        |          | [address]   | [address]      |             |
|        |          | [place], [place]     | [place]      |             |
|        |          | [address]          | [address]  [phone]  |             |
|        |          | [phone]        |  [phone] (Fax)  |             |
+--------+----------+---------------------+----------------------+-------------+
 
 
 
 Social History
 
 
+--------------+-------+-----------+--------+------+
| Tobacco Use  | Types | Packs/Day | Years  | Date |
|              |       |           | Used   |      |
+--------------+-------+-----------+--------+------+
| Never Smoker |       |           |        |      |
+--------------+-------+-----------+--------+------+
 
 
 
+---------------------+---+---+---+
| Smokeless Tobacco:  |   |   |   |
| Never Used          |   |   |   |
+---------------------+---+---+---+
 
 
 
+---------------------------------------------------------------+
| Comments: some second hand smoke exposure, but fairly minimal |
+---------------------------------------------------------------+
 
 
 
+-------------+-------------+---------+----------+
| Alcohol Use | Drinks/Week | oz/Week | Comments |
+-------------+-------------+---------+----------+
| No          |             |         |          |
+-------------+-------------+---------+----------+
 
 
 
 
+------------------+---------------+
| Sex Assigned at  | Date Recorded |
| Birth            |               |
+------------------+---------------+
| Not on file      |               |
+------------------+---------------+
 
 
 
+----------------+-------------+-------------+
| Job Start Date | Occupation  | Industry    |
+----------------+-------------+-------------+
| Not on file    | Not on file | Not on file |
+----------------+-------------+-------------+
 
 
 
+----------------+--------------+------------+
| Travel History | Travel Start | Travel End |
+----------------+--------------+------------+
 
 
 
+-------------------------------------+
| No recent travel history available. |
+-------------------------------------+
 documented as of this encounter
 
 Plan of Treatment
 
 
+--------+-----------+------------+----------------------+-------------------+
| Date   | Type      | Specialty  | Care Team            | Description       |
+--------+-----------+------------+----------------------+-------------------+
| / | Office    | Cardiology |   Tonia Wilson,    |                   |
|    | Visit     |            | ARNJESSICA  [address]   |                   |
|        |           |            | [place]  |                   |
|        |           |            | [address]  [phone]  |                   |
|        |           |            |  [phone] (Fax)  |                   |
+--------+-----------+------------+----------------------+-------------------+
| / | Hospital  | Radiology  |   Popeye Townsend, |                   |
|    | Encounter |            |  MD  [address] |                   |
|        |           |            |  St.  [place],   |                   |
|        |           |            | [place] [address]             |                   |
|        |           |            | [phone]         |                   |
|        |           |            | [phone] (Fax)   |                   |
+--------+-----------+------------+----------------------+-------------------+
| / | Surgery   | Radiology  |   Popeye Townsend, | CV EP PPM SYSTEM  |
|    |           |            |  MD  [address] | IMPLANT           |
|        |           |            |  St.  [place],   |                   |
|        |           |            | [place] [address]             |                   |
|        |           |            | [phone]         |                   |
|        |           |            | [phone] (Fax)   |                   |
+--------+-----------+------------+----------------------+-------------------+
| 02/10/ | Clinical  | Cardiology |                      |                   |
|    | Support   |            |                      |                   |
+--------+-----------+------------+----------------------+-------------------+
| / | Office    | Cardiology |   Tonia Wilson,    |                   |
|    | Visit     |            | ARNP  [address]   |                   |
 
|        |           |            | St  [place], [place]  |                   |
|        |           |            | [address]  [phone]  |                   |
|        |           |            |  [phone] (Fax)  |                   |
+--------+-----------+------------+----------------------+-------------------+
| / | Off-Site  | Nephrology |   Marzena Moser  |                   |
| 2020   | Visit     |            | DO ETIENNE  [address] [place] [address]      |                   |
|        |           |            | [address]      |                   |
|        |           |            | [place]      |                   |
|        |           |            | [address]  [phone]  |                   |
|        |           |            |  [phone] (Fax)  |                   |
+--------+-----------+------------+----------------------+-------------------+
 documented as of this encounter
 
 Procedures
 
 
+----------------------+--------+------------+----------------------+----------------------+
| Procedure Name       | Priori | Date/Time  | Associated Diagnosis | Comments             |
|                      | ty     |            |                      |                      |
+----------------------+--------+------------+----------------------+----------------------+
| EXTERNAL LAB:        | Routin | 2015 |                      |   Results for this   |
| URINALYSIS           | e      |            |                      | procedure are in the |
|                      |        |            |                      |  results section.    |
+----------------------+--------+------------+----------------------+----------------------+
| URINALYSIS W/CULTURE | Routin | 2015 |                      |   Results for this   |
|  IF INDICATED        | e      |            |                      | procedure are in the |
|                      |        |            |                      |  results section.    |
+----------------------+--------+------------+----------------------+----------------------+
 documented in this encounter
 
 Results
 Urinalysis w/Culture if Indicated (2015)
 
+-----------+------------+------------+------------+--------------+
| Component | Value      | Ref Range  | Performed  | Pathologist  |
|           |            |            | At         | Signature    |
+-----------+------------+------------+------------+--------------+
| WBC UA    |  (A) | 0 - 2 /HPF | EXTERNAL   |              |
|           |            |            | LAB        |              |
+-----------+------------+------------+------------+--------------+
 
 
 
+-----------------+
| Specimen        |
+-----------------+
| Urine specimen  |
| (specimen)      |
+-----------------+
 
 
 
+----------------+---------+--------------------+--------------+
| Performing     | Address | City/State/Zipcode | Phone Number |
| Organization   |         |                    |              |
+----------------+---------+--------------------+--------------+
|   EXTERNAL LAB |         |                    |              |
+----------------+---------+--------------------+--------------+
 External Lab: Urinalysis (2015)
 
 
+-------------+----------+-----------+------------+--------------+
| Component   | Value    | Ref Range | Performed  | Pathologist  |
|             |          |           | At         | Signature    |
+-------------+----------+-----------+------------+--------------+
| UA Blood,   | negative |           | EXTERNAL   |              |
| External    |          |           | LAB        |              |
+-------------+----------+-----------+------------+--------------+
| UA Glucose, | normal   |           | EXTERNAL   |              |
|  External   |          |           | LAB        |              |
+-------------+----------+-----------+------------+--------------+
| UA Ketones, | negative |           | EXTERNAL   |              |
|  External   |          |           | LAB        |              |
+-------------+----------+-----------+------------+--------------+
| UA Ph,      | 6        |           | EXTERNAL   |              |
| External    |          |           | LAB        |              |
+-------------+----------+-----------+------------+--------------+
| UA          | 25 mg/dl |           | EXTERNAL   |              |
| Proteins,   |          |           | LAB        |              |
| External    |          |           |            |              |
+-------------+----------+-----------+------------+--------------+
| UA RBC,     | 10       |           | EXTERNAL   |              |
| External    |          |           | LAB        |              |
+-------------+----------+-----------+------------+--------------+
| UA Specific | 1.011    |           | EXTERNAL   |              |
|  Gravity,   |          |           | LAB        |              |
| External    |          |           |            |              |
+-------------+----------+-----------+------------+--------------+
| UA          | 500      |           | EXTERNAL   |              |
| Leukocyte   |          |           | LAB        |              |
| Esterase,   |          |           |            |              |
| External    |          |           |            |              |
+-------------+----------+-----------+------------+--------------+
 
 
 
+--------------------------+
| Resulting Agency Comment |
+--------------------------+
|   [facility]              |
+--------------------------+
 
 
 
+----------------+---------+--------------------+--------------+
| Performing     | Address | City/State/Zipcode | Phone Number |
| Organization   |         |                    |              |
+----------------+---------+--------------------+--------------+
|   EXTERNAL LAB |         |                    |              |
+----------------+---------+--------------------+--------------+
 documented in this encounter
 
 Visit Diagnoses
 Not on filedocumented in this encounter

## 2020-01-08 NOTE — XMS
Encounter Summary
  Created on: 2020
 
 Viviana Ricci
 External Reference #: [id number]
 : 47
 Sex: Female
 
 Demographics
 
 
+-----------------------+----------------------+
| Address               | [address]      |
|                       | [place]  [address] |
+-----------------------+----------------------+
| Home Phone            | [phone]      |
+-----------------------+----------------------+
| Preferred Language    | Unknown              |
+-----------------------+----------------------+
| Marital Status        | Single               |
+-----------------------+----------------------+
| Taoist Affiliation | 1009                 |
+-----------------------+----------------------+
| Race                  | Unknown              |
+-----------------------+----------------------+
| Ethnic Group          | Unknown              |
+-----------------------+----------------------+
 
 
 Author
 
 
+--------------+--------------------------------------------+
| Author       | [facility]  |
|              | and [facility]                                |
+--------------+--------------------------------------------+
| Organization | [facility]  |
|              | and [facility]                                |
+--------------+--------------------------------------------+
| Address      | Unknown                                    |
+--------------+--------------------------------------------+
| Phone        | Unavailable                                |
+--------------+--------------------------------------------+
 
 
 
 Support
 
 
+----------------+--------------+---------------------+-----------------+
| Name           | Relationship | Address             | Phone           |
+----------------+--------------+---------------------+-----------------+
| Ada/Ed Radhames | ECON         | [address]              | [phone] |
|                |              | [place]  |                 |
|                |              |  [address]              |                 |
+----------------+--------------+---------------------+-----------------+
 
 
 
 
 Care Team Providers
 
 
+-----------------------+------+-------------+
| Care Team Member Name | Role | Phone       |
+-----------------------+------+-------------+
 PCP  | Unavailable |
+-----------------------+------+-------------+
 
 
 
 Reason for Visit
 
 
+-------------------+----------+
| Reason            | Comments |
+-------------------+----------+
| Medication Refill |          |
+-------------------+----------+
 
 
 
 Encounter Details
 
 
+--------+--------+---------------------+----------------------+-------------------+
| Date   | Type   | Department          | Care Team            | Description       |
+--------+--------+---------------------+----------------------+-------------------+
| 10/14/ | Refill |   [facility] [place]         |   Marzena Moser  | Medication Refill |
|    |        | NEPHROLOGY  [address]   | M, DO  [address]      |                   |
|        |        | [address]   | [address]      |                   |
|        |        | [place], [place]     | [place], [place]      |                   |
|        |        | [address]          | [address]  [phone]  |                   |
|        |        | [phone]        |  [phone] (Fax)  |                   |
+--------+--------+---------------------+----------------------+-------------------+
 
 
 
 Social History
 
 
+--------------+-------+-----------+--------+------+
| Tobacco Use  | Types | Packs/Day | Years  | Date |
|              |       |           | Used   |      |
+--------------+-------+-----------+--------+------+
| Never Smoker |       |           |        |      |
+--------------+-------+-----------+--------+------+
 
 
 
+---------------------+---+---+---+
| Smokeless Tobacco:  |   |   |   |
| Never Used          |   |   |   |
+---------------------+---+---+---+
 
 
 
+---------------------------------------------------------------+
| Comments: some second hand smoke exposure, but fairly minimal |
 
+---------------------------------------------------------------+
 
 
 
+-------------+-------------+---------+----------+
| Alcohol Use | Drinks/Week | oz/Week | Comments |
+-------------+-------------+---------+----------+
| No          |             |         |          |
+-------------+-------------+---------+----------+
 
 
 
+------------------+---------------+
| Sex Assigned at  | Date Recorded |
| Birth            |               |
+------------------+---------------+
| Not on file      |               |
+------------------+---------------+
 
 
 
+----------------+-------------+-------------+
| Job Start Date | Occupation  | Industry    |
+----------------+-------------+-------------+
| Not on file    | Not on file | Not on file |
+----------------+-------------+-------------+
 
 
 
+----------------+--------------+------------+
| Travel History | Travel Start | Travel End |
+----------------+--------------+------------+
 
 
 
+-------------------------------------+
| No recent travel history available. |
+-------------------------------------+
 documented as of this encounter
 
 Plan of Treatment
 
 
+--------+-----------+------------+----------------------+-------------------+
| Date   | Type      | Specialty  | Care Team            | Description       |
+--------+-----------+------------+----------------------+-------------------+
| / | Office    | Cardiology |   HellalfredoTonia,    |                   |
|    | Visit     |            | ARNP  [address]   |                   |
|        |           |            | St  [place], [place]  |                   |
|        |           |            | [address]  [phone]  |                   |
|        |           |            |  [phone] (Fax)  |                   |
+--------+-----------+------------+----------------------+-------------------+
| / | Hospital  | Radiology  |   Popeye Townsend, |                   |
|    | Encounter |            |  MD  [address] |                   |
|        |           |            |  St.  [place],   |                   |
|        |           |            | [place] [address]             |                   |
|        |           |            | [phone]         |                   |
|        |           |            | [phone] (Fax)   |                   |
+--------+-----------+------------+----------------------+-------------------+
| / | Surgery   | Radiology  |   Popeye Townsend, | CV EP PPM SYSTEM  |
 
|    |           |            |  MD  [address] | IMPLANT           |
|        |           |            |  St.  [place],   |                   |
|        |           |            | [place] [address]             |                   |
|        |           |            | [phone]         |                   |
|        |           |            | [phone] (Fax)   |                   |
+--------+-----------+------------+----------------------+-------------------+
| 02/10/ | Clinical  | Cardiology |                      |                   |
|    | Support   |            |                      |                   |
+--------+-----------+------------+----------------------+-------------------+
| / | Office    | Cardiology |   Tonia Wilson,    |                   |
|    | Visit     |            | ARNP  [address]   |                   |
|        |           |            | [place]  |                   |
|        |           |            | [address]  [phone]  |                   |
|        |           |            |  [phone] (Fax)  |                   |
+--------+-----------+------------+----------------------+-------------------+
| / | Off-Site  | Nephrology |   Marzena Moser  |                   |
|    | Visit     |            | DO ETIENNE  [address] [place] [address]      |                   |
|        |           |            | [address]      |                   |
|        |           |            | [place]      |                   |
|        |           |            | [address]  [phone]  |                   |
|        |           |            |  [phone] (Fax)  |                   |
+--------+-----------+------------+----------------------+-------------------+
 documented as of this encounter
 
 Visit Diagnoses
 Not on filedocumented in this encounter

## 2020-01-08 NOTE — XMS
Encounter Summary
  Created on: 2020
 
 Viviana Ricci
 External Reference #: [id number]
 : 47
 Sex: Female
 
 Demographics
 
 
+-----------------------+----------------------+
| Address               | [address]      |
|                       | [place]  [address] |
+-----------------------+----------------------+
| Home Phone            | [phone]      |
+-----------------------+----------------------+
| Preferred Language    | Unknown              |
+-----------------------+----------------------+
| Marital Status        | Single               |
+-----------------------+----------------------+
| Jainism Affiliation | [id number]                 |
+-----------------------+----------------------+
| Race                  | Unknown              |
+-----------------------+----------------------+
| Ethnic Group          | Unknown              |
+-----------------------+----------------------+
 
 
 Author
 
 
+--------------+--------------------------------------------+
| Author       | [facility]  |
|              | and [place]                                |
+--------------+--------------------------------------------+
| Organization | [facility]  |
|              | and [place]                                |
+--------------+--------------------------------------------+
| Address      | Unknown                                    |
+--------------+--------------------------------------------+
| Phone        | Unavailable                                |
+--------------+--------------------------------------------+
 
 
 
 Support
 
 
+----------------+--------------+---------------------+-----------------+
| Name           | Relationship | Address             | Phone           |
+----------------+--------------+---------------------+-----------------+
| Ada/Ed Radhames | ECON         | [address]              | [phone] |
|                |              | [place]  |                 |
|                |              |  [address]              |                 |
+----------------+--------------+---------------------+-----------------+
 
 
 
 
 Care Team Providers
 
 
+-----------------------+------+-------------+
| Care Team Member Name | Role | Phone       |
+-----------------------+------+-------------+
 PCP  | Unavailable |
+-----------------------+------+-------------+
 
 
 
 Reason for Visit
 
 
+--------+-----------+
| Reason | Comments  |
+--------+-----------+
| Other  | IC Intake |
+--------+-----------+
 
 
 
 Encounter Details
 
 
+--------+-----------+----------------------+----------------------+-------------------+
| Date   | Type      | Department           | Care Team            | Description       |
+--------+-----------+----------------------+----------------------+-------------------+
| 04/15/ | Telephone |   [facility]          |   Popeye Townsend, | Other (IC Intake) |
|    |           | CARDIOLOGY  [address]    |  MD  [address] |                   |
|        |           | [place]  [place], |  St.  [place],   |                   |
|        |           |  [place] [address]       | [place] [address]             |                   |
|        |           | [phone]         | [phone]         |                   |
|        |           |                      | [phone] (Fax)   |                   |
+--------+-----------+----------------------+----------------------+-------------------+
 
 
 
 Social History
 
 
+--------------+-------+-----------+--------+------+
| Tobacco Use  | Types | Packs/Day | Years  | Date |
|              |       |           | Used   |      |
+--------------+-------+-----------+--------+------+
| Never Smoker |       |           |        |      |
+--------------+-------+-----------+--------+------+
 
 
 
+---------------------+---+---+---+
| Smokeless Tobacco:  |   |   |   |
| Never Used          |   |   |   |
+---------------------+---+---+---+
 
 
 
+---------------------------------------------------------------+
| Comments: some second hand smoke exposure, but fairly minimal |
 
+---------------------------------------------------------------+
 
 
 
+-------------+-------------+---------+----------+
| Alcohol Use | Drinks/Week | oz/Week | Comments |
+-------------+-------------+---------+----------+
| No          |             |         |          |
+-------------+-------------+---------+----------+
 
 
 
+------------------+---------------+
| Sex Assigned at  | Date Recorded |
| Birth            |               |
+------------------+---------------+
| Not on file      |               |
+------------------+---------------+
 
 
 
+----------------+-------------+-------------+
| Job Start Date | Occupation  | Industry    |
+----------------+-------------+-------------+
| Not on file    | Not on file | Not on file |
+----------------+-------------+-------------+
 
 
 
+----------------+--------------+------------+
| Travel History | Travel Start | Travel End |
+----------------+--------------+------------+
 
 
 
+-------------------------------------+
| No recent travel history available. |
+-------------------------------------+
 documented as of this encounter
 
 Plan of Treatment
 
 
+--------+-----------+------------+----------------------+-------------------+
| Date   | Type      | Specialty  | Care Team            | Description       |
+--------+-----------+------------+----------------------+-------------------+
| / | Office    | Cardiology |   Tonia Wilson,    |                   |
|    | Visit     |            | ARNP  [address]   |                   |
|        |           |            | St  [place], [place]  |                   |
|        |           |            | [address]  [phone]  |                   |
|        |           |            |  [phone] (Fax)  |                   |
+--------+-----------+------------+----------------------+-------------------+
| / | Hospital  | Radiology  |   Popeye Townsend, |                   |
|    | Encounter |            |  MD  [address] |                   |
|        |           |            |  [place],   |                   |
|        |           |            | [place] [address]             |                   |
|        |           |            | [phone]         |                   |
|        |           |            | [phone] (Fax)   |                   |
+--------+-----------+------------+----------------------+-------------------+
| / | Surgery   | Radiology  |   Popeye Townsend, | CV EP PPM SYSTEM  |
 
|    |           |            |  MD  [address] | IMPLANT           |
|        |           |            |  St.  [place],   |                   |
|        |           |            | [place] [address]             |                   |
|        |           |            | [phone]         |                   |
|        |           |            | [phone] (Fax)   |                   |
+--------+-----------+------------+----------------------+-------------------+
| 02/10/ | Clinical  | Cardiology |                      |                   |
|    | Support   |            |                      |                   |
+--------+-----------+------------+----------------------+-------------------+
| / | Office    | Cardiology |   Tonia Wilson,    |                   |
|    | Visit     |            | POP  [address]   |                   |
|        |           |            | [place]  |                   |
|        |           |            | [address]  [phone]  |                   |
|        |           |            |  [phone] (Fax)  |                   |
+--------+-----------+------------+----------------------+-------------------+
| / | Off-Site  | Nephrology |   Marzena Moser  |                   |
|    | Visit     |            | DO ETIENNE  [address] [place] [address]      |                   |
|        |           |            | [address]      |                   |
|        |           |            | [place]      |                   |
|        |           |            | [address] [phone]  |                   |
|        |           |            |  [phone] (Fax)  |                   |
+--------+-----------+------------+----------------------+-------------------+
 documented as of this encounter
 
 Visit Diagnoses
 Not on filedocumented in this encounter

## 2020-01-08 NOTE — XMS
Encounter Summary
  Created on: 2020
 
 Viviana Ricci
 External Reference #: [id number]
 : 47
 Sex: Female
 
 Demographics
 
 
+-----------------------+----------------------+
| Address               | [address]      |
|                       | [place]  [address] |
+-----------------------+----------------------+
| Home Phone            | [phone]      |
+-----------------------+----------------------+
| Preferred Language    | Unknown              |
+-----------------------+----------------------+
| Marital Status        | Single               |
+-----------------------+----------------------+
| Christian Affiliation | [id number]                 |
+-----------------------+----------------------+
| Race                  | Unknown              |
+-----------------------+----------------------+
| Ethnic Group          | Unknown              |
+-----------------------+----------------------+
 
 
 Author
 
 
+--------------+--------------------------------------------+
| Author       | [facility]  |
|              | and [facility]                                |
+--------------+--------------------------------------------+
| Organization | [facility]  |
|              | and [facility]                                |
+--------------+--------------------------------------------+
| Address      | Unknown                                    |
+--------------+--------------------------------------------+
| Phone        | Unavailable                                |
+--------------+--------------------------------------------+
 
 
 
 Support
 
 
+----------------+--------------+---------------------+-----------------+
| Name           | Relationship | Address             | Phone           |
+----------------+--------------+---------------------+-----------------+
| Ada/Ed Radhames | ECON         | [address]              | [phone] |
|                |              | [place]  |                 |
|                |              |  [address]              |                 |
+----------------+--------------+---------------------+-----------------+
 
 
 
 
 Care Team Providers
 
 
+-----------------------+------+-------------+
| Care Team Member Name | Role | Phone       |
+-----------------------+------+-------------+
 PCP  | Unavailable |
+-----------------------+------+-------------+
 
 
 
 Reason for Visit
 
 
+----------------+----------+
| Reason         | Comments |
+----------------+----------+
| Urinary Tract  |          |
| Infection      |          |
+----------------+----------+
 
 
 
 Encounter Details
 
 
+--------+-----------+---------------------+----------------------+----------------+
| Date   | Type      | Department          | Care Team            | Description    |
+--------+-----------+---------------------+----------------------+----------------+
| / | Telephone |   [facility] [place]         |   Marzena Moser  | Urinary Tract  |
|    |           | NEPHROLOGY  [address]   | M, DO  [address]      | Infection      |
|        |           | [address]   | [address]      |                |
|        |           | [place], [place]     | [place], [place]      |                |
|        |           | [address]          | [address]  [phone]  |                |
|        |           | [phone]        |  [phone] (Fax)  |                |
+--------+-----------+---------------------+----------------------+----------------+
 
 
 
 Social History
 
 
+--------------+-------+-----------+--------+------+
| Tobacco Use  | Types | Packs/Day | Years  | Date |
|              |       |           | Used   |      |
+--------------+-------+-----------+--------+------+
| Never Smoker |       |           |        |      |
+--------------+-------+-----------+--------+------+
 
 
 
+---------------------+---+---+---+
| Smokeless Tobacco:  |   |   |   |
| Never Used          |   |   |   |
+---------------------+---+---+---+
 
 
 
+---------------------------------------------------------------+
 
| Comments: some second hand smoke exposure, but fairly minimal |
+---------------------------------------------------------------+
 
 
 
+-------------+-------------+---------+----------+
| Alcohol Use | Drinks/Week | oz/Week | Comments |
+-------------+-------------+---------+----------+
| No          |             |         |          |
+-------------+-------------+---------+----------+
 
 
 
+------------------+---------------+
| Sex Assigned at  | Date Recorded |
| Birth            |               |
+------------------+---------------+
| Not on file      |               |
+------------------+---------------+
 
 
 
+----------------+-------------+-------------+
| Job Start Date | Occupation  | Industry    |
+----------------+-------------+-------------+
| Not on file    | Not on file | Not on file |
+----------------+-------------+-------------+
 
 
 
+----------------+--------------+------------+
| Travel History | Travel Start | Travel End |
+----------------+--------------+------------+
 
 
 
+-------------------------------------+
| No recent travel history available. |
+-------------------------------------+
 documented as of this encounter
 
 Plan of Treatment
 
 
+--------+-----------+------------+----------------------+-------------------+
| Date   | Type      | Specialty  | Care Team            | Description       |
+--------+-----------+------------+----------------------+-------------------+
| / | Office    | Cardiology |   Tonia Wilson,    |                   |
|    | Visit     |            | ARNJESSICA  [address]   |                   |
|        |           |            | St  [facility] [place]  |                   |
|        |           |            | [address]  [phone]  |                   |
|        |           |            |  [phone] (Fax)  |                   |
+--------+-----------+------------+----------------------+-------------------+
| / | Hospital  | Radiology  |   Popeye Townsend, |                   |
|    | Encounter |            |  MD  [address] |                   |
|        |           |            |  [place],   |                   |
|        |           |            | [place] [address]             |                   |
|        |           |            | [phone]         |                   |
|        |           |            | [phone] (Fax)   |                   |
+--------+-----------+------------+----------------------+-------------------+
 
| / | Surgery   | Radiology  |   Popeye Townsend, | CV EP PPM SYSTEM  |
|    |           |            |  MD  [address] | IMPLANT           |
|        |           |            |  St.  [place],   |                   |
|        |           |            | [place] [address]             |                   |
|        |           |            | [phone]         |                   |
|        |           |            | [phone] (Fax)   |                   |
+--------+-----------+------------+----------------------+-------------------+
| 02/10/ | Clinical  | Cardiology |                      |                   |
|    | Support   |            |                      |                   |
+--------+-----------+------------+----------------------+-------------------+
| / | Office    | Cardiology |   Tonia Wilson,    |                   |
|    | Visit     |            | ARNP  [address]   |                   |
|        |           |            | [place]  |                   |
|        |           |            | [address]  [phone]  |                   |
|        |           |            |  [phone] (Fax)  |                   |
+--------+-----------+------------+----------------------+-------------------+
| / | Off-Site  | Nephrology |   Marzena Moser  |                   |
|    | Visit     |            | DO ETIENNE  [address] [place] [address]      |                   |
|        |           |            | [address]      |                   |
|        |           |            | [place]      |                   |
|        |           |            | [address] [phone]  |                   |
|        |           |            |  [phone] (Fax)  |                   |
+--------+-----------+------------+----------------------+-------------------+
 documented as of this encounter
 
 Visit Diagnoses
 Not on filedocumented in this encounter

## 2020-01-08 NOTE — XMS
Encounter Summary
  Created on: 2020
 
 Viviana Ricci
 External Reference #: [id number]
 : 47
 Sex: Female
 
 Demographics
 
 
+-----------------------+----------------------+
| Address               | [address]      |
|                       | [place]  [address] |
+-----------------------+----------------------+
| Home Phone            | [phone]      |
+-----------------------+----------------------+
| Preferred Language    | Unknown              |
+-----------------------+----------------------+
| Marital Status        | Single               |
+-----------------------+----------------------+
| Yazdanism Affiliation | [id number]                 |
+-----------------------+----------------------+
| Race                  | Unknown              |
+-----------------------+----------------------+
| Ethnic Group          | Unknown              |
+-----------------------+----------------------+
 
 
 Author
 
 
+--------------+--------------------------------------------+
| Author       | [facility]  |
|              | and [place]                                |
+--------------+--------------------------------------------+
| Organization | [facility]  |
|              | and [place]                                |
+--------------+--------------------------------------------+
| Address      | Unknown                                    |
+--------------+--------------------------------------------+
| Phone        | Unavailable                                |
+--------------+--------------------------------------------+
 
 
 
 Support
 
 
+----------------+--------------+---------------------+-----------------+
| Name           | Relationship | Address             | Phone           |
+----------------+--------------+---------------------+-----------------+
| Ada/Ed Radhames | ECON         | [place]              | [phone] |
|                |              | [place]  |                 |
|                |              |  [address]              |                 |
+----------------+--------------+---------------------+-----------------+
 
 
 
 
 Care Team Providers
 
 
+-----------------------+------+-------------+
| Care Team Member Name | Role | Phone       |
+-----------------------+------+-------------+
 PCP  | Unavailable |
+-----------------------+------+-------------+
 
 
 
 Reason for Visit
 
 
+----------------+----------+
| Reason         | Comments |
+----------------+----------+
| Urinary Tract  |          |
| Infection      |          |
+----------------+----------+
 
 
 
 Encounter Details
 
 
+--------+-----------+---------------------+----------------------+----------------+
| Date   | Type      | Department          | Care Team            | Description    |
+--------+-----------+---------------------+----------------------+----------------+
| 08/10/ | Telephone |   [facility]         |   Marzena Moser  | Urinary Tract  |
| 2018   |           | NEPHROLOGY  [address]   | M, DO  [address]      | Infection      |
|        |           | [address]   | [address]      |                |
|        |           | [place], [place]     | [place], [place]      |                |
|        |           | [address]          | [address]  [phone]  |                |
|        |           | [phone]        |  [phone] (Fax)  |                |
+--------+-----------+---------------------+----------------------+----------------+
 
 
 
 Social History
 
 
+--------------+-------+-----------+--------+------+
| Tobacco Use  | Types | Packs/Day | Years  | Date |
|              |       |           | Used   |      |
+--------------+-------+-----------+--------+------+
| Never Smoker |       |           |        |      |
+--------------+-------+-----------+--------+------+
 
 
 
+---------------------+---+---+---+
| Smokeless Tobacco:  |   |   |   |
| Never Used          |   |   |   |
+---------------------+---+---+---+
 
 
 
+---------------------------------------------------------------+
 
| Comments: some second hand smoke exposure, but fairly minimal |
+---------------------------------------------------------------+
 
 
 
+-------------+-------------+---------+----------+
| Alcohol Use | Drinks/Week | oz/Week | Comments |
+-------------+-------------+---------+----------+
| No          |             |         |          |
+-------------+-------------+---------+----------+
 
 
 
+------------------+---------------+
| Sex Assigned at  | Date Recorded |
| Birth            |               |
+------------------+---------------+
| Not on file      |               |
+------------------+---------------+
 
 
 
+----------------+-------------+-------------+
| Job Start Date | Occupation  | Industry    |
+----------------+-------------+-------------+
| Not on file    | Not on file | Not on file |
+----------------+-------------+-------------+
 
 
 
+----------------+--------------+------------+
| Travel History | Travel Start | Travel End |
+----------------+--------------+------------+
 
 
 
+-------------------------------------+
| No recent travel history available. |
+-------------------------------------+
 documented as of this encounter
 
 Plan of Treatment
 
 
+--------+-----------+------------+----------------------+-------------------+
| Date   | Type      | Specialty  | Care Team            | Description       |
+--------+-----------+------------+----------------------+-------------------+
| / | Office    | Cardiology |   Tonia Wilson,    |                   |
|    | Visit     |            | ARNJESSICA  [address]   |                   |
|        |           |            | St  [facility] [facility] [place]  |                   |
|        |           |            | [address]  [phone]  |                   |
|        |           |            |  [phone] (Fax)  |                   |
+--------+-----------+------------+----------------------+-------------------+
| / | Hospital  | Radiology  |   Popeye Townsend, |                   |
|    | Encounter |            |  MD  [address] |                   |
|        |           |            |  [place],   |                   |
|        |           |            | [place] [address]             |                   |
|        |           |            | [phone]         |                   |
|        |           |            | [phone] (Fax)   |                   |
+--------+-----------+------------+----------------------+-------------------+
 
| / | Surgery   | Radiology  |   Popeye Townsend, | CV EP PPM SYSTEM  |
|    |           |            |  MD  [address] | IMPLANT           |
|        |           |            |  St.  [place],   |                   |
|        |           |            | [place] [address]             |                   |
|        |           |            | [phone]         |                   |
|        |           |            | [phone] (Fax)   |                   |
+--------+-----------+------------+----------------------+-------------------+
| 02/10/ | Clinical  | Cardiology |                      |                   |
|    | Support   |            |                      |                   |
+--------+-----------+------------+----------------------+-------------------+
| / | Office    | Cardiology |   Tonia Wilson,    |                   |
|    | Visit     |            | ARNP  [address]   |                   |
|        |           |            | [place]  |                   |
|        |           |            | [address]  [phone]  |                   |
|        |           |            |  [phone] (Fax)  |                   |
+--------+-----------+------------+----------------------+-------------------+
| / | Off-Site  | Nephrology |   Marzena Moser  |                   |
|    | Visit     |            | DO ETIENNE  [address] [place] [address]      |                   |
|        |           |            | [address]      |                   |
|        |           |            | [place]      |                   |
|        |           |            | [address] [phone]  |                   |
|        |           |            |  [phone] (Fax)  |                   |
+--------+-----------+------------+----------------------+-------------------+
 documented as of this encounter
 
 Visit Diagnoses
 Not on filedocumented in this encounter

## 2020-01-08 NOTE — XMS
Encounter Summary
  Created on: 2020
 
 Viviana Ricci
 External Reference #: [id number]
 : 47
 Sex: Female
 
 Demographics
 
 
+-----------------------+----------------------+
| Address               | [address]      |
|                       | [place]  [address] |
+-----------------------+----------------------+
| Home Phone            | [phone]      |
+-----------------------+----------------------+
| Preferred Language    | Unknown              |
+-----------------------+----------------------+
| Marital Status        | Single               |
+-----------------------+----------------------+
| Holiness Affiliation | 1009                 |
+-----------------------+----------------------+
| Race                  | Unknown              |
+-----------------------+----------------------+
| Ethnic Group          | Unknown              |
+-----------------------+----------------------+
 
 
 Author
 
 
+--------------+--------------------------------------------+
| Author       | [facility]  |
|              | and [place]                                |
+--------------+--------------------------------------------+
| Organization | [facility] [place]  |
|              | and [place]                                |
+--------------+--------------------------------------------+
| Address      | Unknown                                    |
+--------------+--------------------------------------------+
| Phone        | Unavailable                                |
+--------------+--------------------------------------------+
 
 
 
 Support
 
 
+----------------+--------------+---------------------+-----------------+
| Name           | Relationship | Address             | Phone           |
+----------------+--------------+---------------------+-----------------+
| Ada/Ed Ardhames | ECON         | [address]              | [phone] |
|                |              | [place]  |                 |
|                |              |  [address]              |                 |
+----------------+--------------+---------------------+-----------------+
 
 
 
 
 Care Team Providers
 
 
+------------------------+------+-----------------+
| Care Team Member Name  | Role | Phone           |
+------------------------+------+-----------------+
| Marzena Moser DO | PCP  | [phone] |
+------------------------+------+-----------------+
 
 
 
 Reason for Visit
 
 
+--------+-----------------+
| Reason | Comments        |
+--------+-----------------+
| Other  | monitor report  |
+--------+-----------------+
 
 
 
 Encounter Details
 
 
+--------+-----------+----------------------+----------------------+-----------------+
| Date   | Type      | Department           | Care Team            | Description     |
+--------+-----------+----------------------+----------------------+-----------------+
| 10/21/ | Telephone |   [facility] [place]          |   Popeye Townsend, | Other (monitor  |
| 2019   |           | CARDIOLOGY  [address]    |  MD  [address] | report )        |
|        |           | [address]  [place], |  St.  [place],   |                 |
|        |           |  [place] [address]       | [place] [address]             |                 |
|        |           | [phone]         | [phone]         |                 |
|        |           |                      | [phone] (Fax)   |                 |
+--------+-----------+----------------------+----------------------+-----------------+
 
 
 
 Social History
 
 
+--------------+-------+-----------+--------+------+
| Tobacco Use  | Types | Packs/Day | Years  | Date |
|              |       |           | Used   |      |
+--------------+-------+-----------+--------+------+
| Never Smoker |       |           |        |      |
+--------------+-------+-----------+--------+------+
 
 
 
+---------------------+---+---+---+
| Smokeless Tobacco:  |   |   |   |
| Never Used          |   |   |   |
+---------------------+---+---+---+
 
 
 
+---------------------------------------------------------------+
| Comments: some second hand smoke exposure, but fairly minimal |
 
+---------------------------------------------------------------+
 
 
 
+-------------+-------------+---------+----------+
| Alcohol Use | Drinks/Week | oz/Week | Comments |
+-------------+-------------+---------+----------+
| No          |             |         |          |
+-------------+-------------+---------+----------+
 
 
 
+------------------+---------------+
| Sex Assigned at  | Date Recorded |
| Birth            |               |
+------------------+---------------+
| Not on file      |               |
+------------------+---------------+
 
 
 
+----------------+-------------+-------------+
| Job Start Date | Occupation  | Industry    |
+----------------+-------------+-------------+
| Not on file    | Not on file | Not on file |
+----------------+-------------+-------------+
 
 
 
+----------------+--------------+------------+
| Travel History | Travel Start | Travel End |
+----------------+--------------+------------+
 
 
 
+-------------------------------------+
| No recent travel history available. |
+-------------------------------------+
 documented as of this encounter
 
 Plan of Treatment
 
 
+--------+-----------+------------+----------------------+-------------------+
| Date   | Type      | Specialty  | Care Team            | Description       |
+--------+-----------+------------+----------------------+-------------------+
| / | Office    | Cardiology |   Tonia Wilson,    |                   |
|    | Visit     |            | ARNP  [address]   |                   |
|        |           |            | St [place], [place]  |                   |
|        |           |            | [address]  [phone]  |                   |
|        |           |            |  [phone] (Fax)  |                   |
+--------+-----------+------------+----------------------+-------------------+
| / | Hospital  | Radiology  |   Popeye Townsend, |                   |
|    | Encounter |            |  MD  [address] |                   |
|        |           |            |  [place],   |                   |
|        |           |            | [place] [address]             |                   |
|        |           |            | [phone]         |                   |
|        |           |            | [phone] (Fax)   |                   |
+--------+-----------+------------+----------------------+-------------------+
| / | Surgery   | Radiology  |   Popeye Townsend, | CV EP PPM SYSTEM  |
 
|    |           |            |  MD  [address] | IMPLANT           |
|        |           |            |  [place],   |                   |
|        |           |            | [place] [address]             |                   |
|        |           |            | [phone]         |                   |
|        |           |            | [phone] (Fax)   |                   |
+--------+-----------+------------+----------------------+-------------------+
| 02/10/ | Clinical  | Cardiology |                      |                   |
|    | Support   |            |                      |                   |
+--------+-----------+------------+----------------------+-------------------+
| / | Office    | Cardiology |   Tonia Wilson,    |                   |
|    | Visit     |            | ARNP  [address]   |                   |
|        |           |            | [place]  |                   |
|        |           |            | [id number] [phone]  |                   |
|        |           |            |  [phone] (Fax)  |                   |
+--------+-----------+------------+----------------------+-------------------+
| / | Off-Site  | Nephrology |   Marzena Moser  |                   |
|    | Visit     |            | DO ETIENNE  [address] [place] [address]      |                   |
|        |           |            | [address]      |                   |
|        |           |            | [place]      |                   |
|        |           |            | [address]  [phone]  |                   |
|        |           |            |  [phone] (Fax)  |                   |
+--------+-----------+------------+----------------------+-------------------+
 documented as of this encounter
 
 Visit Diagnoses
 Not on filedocumented in this encounter

## 2020-01-08 NOTE — XMS
Encounter Summary
  Created on: 2020
 
 Viviana Ricci
 External Reference #: [id number]
 : 47
 Sex: Female
 
 Demographics
 
 
+-----------------------+----------------------+
| Address               | [address]      |
|                       | [place]  [address] |
+-----------------------+----------------------+
| Home Phone            | [phone]      |
+-----------------------+----------------------+
| Preferred Language    | Unknown              |
+-----------------------+----------------------+
| Marital Status        | Single               |
+-----------------------+----------------------+
| [employer] Affiliation | [id number]                 |
+-----------------------+----------------------+
| Race                  | Unknown              |
+-----------------------+----------------------+
| Ethnic Group          | Unknown              |
+-----------------------+----------------------+
 
 
 Author
 
 
+--------------+--------------------------------------------+
| Author       | [facility]  |
|              | and [facility]                                |
+--------------+--------------------------------------------+
| Organization | [facility]  |
|              | and [facility]                                |
+--------------+--------------------------------------------+
| Address      | Unknown                                    |
+--------------+--------------------------------------------+
| Phone        | Unavailable                                |
+--------------+--------------------------------------------+
 
 
 
 Support
 
 
+----------------+--------------+---------------------+-----------------+
| Name           | Relationship | Address             | Phone           |
+----------------+--------------+---------------------+-----------------+
| Ada/Ed Radhames | ECON         | [address]              | [phone] |
|                |              | [place]  |                 |
|                |              |  [id number]              |                 |
+----------------+--------------+---------------------+-----------------+
 
 
 
 
 Care Team Providers
 
 
+-----------------------+------+-------------+
| Care Team Member Name | Role | Phone       |
+-----------------------+------+-------------+
 PCP  | Unavailable |
+-----------------------+------+-------------+
 
 
 
 Encounter Details
 
 
+--------+-----------+----------------------+----------------------+-------------+
| Date   | Type      | Department           | Care Team            | Description |
+--------+-----------+----------------------+----------------------+-------------+
| / | Hospital  |   [facility] |   Edgar Sanders,   |             |
|  - | Encounter |  [facility]     | MD  [address] [place] [address]     |             |
|        |           | [address]  | [place]      |             |
| / |           | [place] [address] | [address]  [phone]  |             |
|    |           |   [phone]       |  [phone] (Fax)  |             |
+--------+-----------+----------------------+----------------------+-------------+
 
 
 
 Social History
 
 
+----------------+-------+-----------+--------+------+
| Tobacco Use    | Types | Packs/Day | Years  | Date |
|                |       |           | Used   |      |
+----------------+-------+-----------+--------+------+
| Never Assessed |       |           |        |      |
+----------------+-------+-----------+--------+------+
 
 
 
+------------------+---------------+
| Sex Assigned at  | Date Recorded |
| Birth            |               |
+------------------+---------------+
| Not on file      |               |
+------------------+---------------+
 
 
 
+----------------+-------------+-------------+
| Job Start Date | Occupation  | Industry    |
+----------------+-------------+-------------+
| Not on file    | Not on file | Not on file |
+----------------+-------------+-------------+
 
 
 
+----------------+--------------+------------+
| Travel History | Travel Start | Travel End |
+----------------+--------------+------------+
 
 
 
 
+-------------------------------------+
| No recent travel history available. |
+-------------------------------------+
 documented as of this encounter
 
 Plan of Treatment
 
 
+--------+-----------+------------+----------------------+-------------------+
| Date   | Type      | Specialty  | Care Team            | Description       |
+--------+-----------+------------+----------------------+-------------------+
| / | Office    | Cardiology |   Tonia Wilson,    |                   |
|    | Visit     |            | ARNJESSICA  [address]   |                   |
|        |           |            | St  [place], [place]  |                   |
|        |           |            | [address]  [phone]  |                   |
|        |           |            |  [phone] (Fax)  |                   |
+--------+-----------+------------+----------------------+-------------------+
| / | Hospital  | Radiology  |   Popeye Townsend, |                   |
|    | Encounter |            |  MD  [address] |                   |
|        |           |            |  [place],   |                   |
|        |           |            | [place] [address]             |                   |
|        |           |            | [phone]         |                   |
|        |           |            | [phone] (Fax)   |                   |
+--------+-----------+------------+----------------------+-------------------+
| / | Surgery   | Radiology  |   Popeye Townsend, | CV EP PPM SYSTEM  |
|    |           |            |  MD  [address] | IMPLANT           |
|        |           |            |  [place],   |                   |
|        |           |            | [place] [address]             |                   |
|        |           |            | [phone]         |                   |
|        |           |            | [phone] (Fax)   |                   |
+--------+-----------+------------+----------------------+-------------------+
| 02/10/ | Clinical  | Cardiology |                      |                   |
|    | Support   |            |                      |                   |
+--------+-----------+------------+----------------------+-------------------+
| / | Office    | Cardiology |   Tonia Wilson,    |                   |
|    | Visit     |            | BELKIS  [address]   |                   |
|        |           |            | [place]  |                   |
|        |           |            | [address]  [phone]  |                   |
|        |           |            |  [phone] (Fax)  |                   |
+--------+-----------+------------+----------------------+-------------------+
| / | Off-Site  | Nephrology |   Marzena Moser  |                   |
|    | Visit     |            | DO ETIENNE  [address] [place] [address]      |                   |
|        |           |            | [address]      |                   |
|        |           |            | [place]      |                   |
|        |           |            | [address]  [phone]  |                   |
|        |           |            |  [phone] (Fax)  |                   |
+--------+-----------+------------+----------------------+-------------------+
 documented as of this encounter
 
 Visit Diagnoses
 Not on filedocumented in this encounter

## 2020-01-08 NOTE — XMS
Encounter Summary
  Created on: 2020
 
 Viviana Ricci
 External Reference #: [id number]
 : 47
 Sex: Female
 
 Demographics
 
 
+-----------------------+----------------------+
| Address               | [address]      |
|                       | [place]  [address] |
+-----------------------+----------------------+
| Home Phone            | [phone]      |
+-----------------------+----------------------+
| Preferred Language    | Unknown              |
+-----------------------+----------------------+
| Marital Status        | Single               |
+-----------------------+----------------------+
| Zoroastrianism Affiliation | [id number]                 |
+-----------------------+----------------------+
| Race                  | Unknown              |
+-----------------------+----------------------+
| Ethnic Group          | Unknown              |
+-----------------------+----------------------+
 
 
 Author
 
 
+--------------+--------------------------------------------+
| Author       | [facility]  |
|              | and [facility]                                |
+--------------+--------------------------------------------+
| Organization | [facility]  |
|              | and [facility]                                |
+--------------+--------------------------------------------+
| Address      | Unknown                                    |
+--------------+--------------------------------------------+
| Phone        | Unavailable                                |
+--------------+--------------------------------------------+
 
 
 
 Support
 
 
+----------------+--------------+---------------------+-----------------+
| Name           | Relationship | Address             | Phone           |
+----------------+--------------+---------------------+-----------------+
| Ada/Ed Radhames | ECON         | [address]              | [phone] |
|                |              | [place]  |                 |
|                |              |  [id number]              |                 |
+----------------+--------------+---------------------+-----------------+
 
 
 
 
 Care Team Providers
 
 
+-----------------------+------+-------------+
| Care Team Member Name | Role | Phone       |
+-----------------------+------+-------------+
 PCP  | Unavailable |
+-----------------------+------+-------------+
 
 
 
 Reason for Visit
 
 
+------------------+------------------------+
| Reason           | Comments               |
+------------------+------------------------+
| Follow-up        | Pulmonary Hypertension |
+------------------+------------------------+
| Coronary Artery  |                        |
| Disease          |                        |
+------------------+------------------------+
| Hypertension     |                        |
+------------------+------------------------+
| Hyperlipidemia   |                        |
+------------------+------------------------+
 
 
 
 Encounter Details
 
 
+--------+---------+----------------------+----------------------+----------------------+
| Date   | Type    | Department           | Care Team            | Description          |
+--------+---------+----------------------+----------------------+----------------------+
| / | Office  |   [facility]          |   Abebe Townsend, | Murmur (Primary Dx); |
|    | Visit   | CARDIOLOGY  [address]    |  MD  [address] |  Other               |
|        |         | [address]  [place], |  [place]  [place],   | hyperlipidemia;      |
|        |         |  [place] [address]       | [place] [address]             | Coronary artery      |
|        |         | [phone]         | [phone]         | disease involving    |
|        |         |                      | [phone] (Fax)   | native artery of     |
|        |         |                      | Tonia Wilson ARNP | transplanted heart   |
|        |         |                      |   [address]    | without angina       |
|        |         |                      | [place], [place]      | pectoris;            |
|        |         |                      | [address]  [phone]  | Hypertension,        |
|        |         |                      |  [phone] (Fax)  | essential            |
+--------+---------+----------------------+----------------------+----------------------+
 
 
 
 Social History
 
 
+--------------+-------+-----------+--------+------+
| Tobacco Use  | Types | Packs/Day | Years  | Date |
|              |       |           | Used   |      |
+--------------+-------+-----------+--------+------+
| Never Smoker |       |           |        |      |
+--------------+-------+-----------+--------+------+
 
 
 
 
+---------------------+---+---+---+
| Smokeless Tobacco:  |   |   |   |
| Never Used          |   |   |   |
+---------------------+---+---+---+
 
 
 
+---------------------------------------------------------------+
| Comments: some second hand smoke exposure, but fairly minimal |
+---------------------------------------------------------------+
 
 
 
+-------------+-------------+---------+----------+
| Alcohol Use | Drinks/Week | oz/Week | Comments |
+-------------+-------------+---------+----------+
| No          |             |         |          |
+-------------+-------------+---------+----------+
 
 
 
+------------------+---------------+
| Sex Assigned at  | Date Recorded |
| Birth            |               |
+------------------+---------------+
| Not on file      |               |
+------------------+---------------+
 
 
 
+----------------+-------------+-------------+
| Job Start Date | Occupation  | Industry    |
+----------------+-------------+-------------+
| Not on file    | Not on file | Not on file |
+----------------+-------------+-------------+
 
 
 
+----------------+--------------+------------+
| Travel History | Travel Start | Travel End |
+----------------+--------------+------------+
 
 
 
+-------------------------------------+
| No recent travel history available. |
+-------------------------------------+
 documented as of this encounter
 
 Last Filed Vital Signs
 
 
+-------------------+-------------------+----------------------+-----------+
| Vital Sign        | Reading           | Time Taken           | Comments  |
+-------------------+-------------------+----------------------+-----------+
| Blood Pressure    | 130/76            | 2015  3:02 PM  |           |
|                   |                   | PST                  |           |
 
+-------------------+-------------------+----------------------+-----------+
| Pulse             | 64                | 2015  3:02 PM  | irregular |
|                   |                   | PST                  |           |
+-------------------+-------------------+----------------------+-----------+
| Temperature       | -                 | -                    |           |
+-------------------+-------------------+----------------------+-----------+
| Respiratory Rate  | 18                | 2015  3:02 PM  |           |
|                   |                   | PST                  |           |
+-------------------+-------------------+----------------------+-----------+
| Oxygen Saturation | -                 | -                    |           |
+-------------------+-------------------+----------------------+-----------+
| Inhaled Oxygen    | -                 | -                    |           |
| Concentration     |                   |                      |           |
+-------------------+-------------------+----------------------+-----------+
| Weight            | 124.3 kg (274 lb) | 2015  3:02 PM  |           |
|                   |                   | PST                  |           |
+-------------------+-------------------+----------------------+-----------+
| Height            | 167.6 cm (5' 6")  | 2015  3:02 PM  |           |
|                   |                   | PST                  |           |
+-------------------+-------------------+----------------------+-----------+
| Body Mass Index   | 44.22             | 2015  3:02 PM  |           |
|                   |                   | PST                  |           |
+-------------------+-------------------+----------------------+-----------+
 documented in this encounter
 
 Progress Notes
 Abebe Townsend MD - 2015  3:14 PM PSTFormatting of this note might be different f
rom the original.
   
 
 PATIENT NAME:  Viviana Ricci  
 : 1945:         AGE: 70 y.o.
  PRIMARY CARE:
 Marzena Moser DO 
 
 OUTPATIENT FOLLOW UP VISIT
 
 Date of Service: 2015 
 
 HISTORY OF PRESENT ILLNESS:
 Viviana Ricci is a 70 y.o. female with a history of CAD post CABG x 3 in , history of 
diabetes, renal failure post a renal transplantation, morbid obesity, inactivity, hypertensi
on, hyperlipidemia, pulmonary hypertension and severe arthritis.  She is being seen today fo
r follow up coronary artery disease.
 
 She was last seen 14 at which time she had no changes in her medical regimen.  Since 
that time, she had a DVT 2015 and was started on Eliquis by a provider in [facility], and 
the patient is not sure how long she is going to be on it. She has had a fair energy level. 
 She has not been very active.  She enjoys volunteering in her [employer], visiting with friends
 and family in her spare time.  She has not had any chest pain or discomfort at rest or with
 exertion.   She has had shortness of breath with exertion of walking into the [employer] when t
he air is cold.  She has not had any lightheadedness or dizziness.  She has not noticed palp
itations.  She has noticed that leg swelling has worsened since last visit and especially af
ter her DVT in January.  She is able to sleep laying down at night without any symptoms of s
hortness of breath.  She sleeps with CPAP machine in place nightly. 
 
 MEDICAL, SURGICAL, AND PERSONAL HISTORY
 Past Medical, Surgical, Family, and Social History are reviewed in EPIC.
 
 CURRENT PROBLEMS
 
 Patient Active Problem List 
 Diagnosis 
   Chronic low back pain 
   Hypothyroidism 
   Hyperlipidemia 
   Complications of transplanted kidney 
   Movement disorder 
   Diabetes mellitus type II, uncontrolled  
   Pulmonary hypertension  
   Coronary artery disease involving native coronary artery without angina pectoris 
   JACK on CPAP 
   Obesity hypoventilation syndrome 
   Kidney replaced by transplant 
   Secondary hyperparathyroidism 
   Dyspnea 
   FSGS (focal segmental glomerulosclerosis) 
   Murmur 
   Cardiomegaly 
   Hypertension, essential 
   PLMD (periodic limb movement disorder) 
   Chest pain 
   UTI (lower urinary tract infection) 
 
 CURRENT MEDICATIONS
 Current Outpatient Prescriptions 
 Medication Sig Dispense Refill 
   allopurinol (ZYLOPRIM) 100 mg tablet Take 1 tablet by mouth Daily. 30 tablet 11 
   apixaban (ELIQUIS) 2.5 mg tablet Take 1 tablet by mouth 2 times daily. 60 tablet 6 
   cholecalciferol (VITAMIN D-3) 2000 UNITS TABS Take 5,000 Units by mouth Every other day
.   
   cinacalcet (SENSIPAR) 30 mg tablet Take 1 tablet by mouth Daily. 30 tablet 12 
   fludrocortisone (FLORINEF) 0.1 mg tablet Take 1 tablet by mouth Every other day. 45 tab
let 2 
   furosemide (LASIX) 40 mg tablet Take 1 tablet by mouth Daily as needed. 30 tablet 5 
   glucose blood VI test strips (ONE TOUCH ULTRA TEST) strip Check blood sugar before each
 meal and as directed 100 each 12 
   insulin glargine (LANTUS) 100 units/mL injection (vial) Inject 20 Units under the skin 
every morning. 10 mL 11 
   insulin lispro (HUMALOG) 100 units/mL injection (vial) Inject subcutaneously before sara
ls according to sliding scale 10 vial 11 
   Insulin Syringe-Needle U-100 (BD INSULIN SYRINGE ULTRAFINE) 31G X 5/16" 0.5 ML MISC Use
 before meals and as directed. 100 each 11 
   levothyroxine (LEVOTHROID) 50 mcg tablet Take 1 tablet by mouth Daily. 30 tablet 11 
   lisinopril (PRINIVIL,ZESTRIL) 30 MG tablet Take 1 tablet by mouth Daily. 90 tablet 3 
   loperamide (ANTI-DIARRHEAL) 2 mg capsule Take 1 capsule by mouth 4 times daily as neede
d. 60 capsule 5 
   losartan (COZAAR) 50 mg tablet Take 1 tablet by mouth Daily. 90 tablet 4 
   magnesium oxide (MAG-OX) 400 mg tablet Take 1 tablet by mouth 2 times daily. 62 tablet 
11 
   metoprolol tartrate (LOPRESSOR) 25 mg tablet Take 1 tablet by mouth 2 times daily. 60 t
ablet 11 
   mycophenolate (CELLCEPT) 250 mg capsule Take 1 capsule by mouth 3 times daily. 90 capsu
le 11 
   omeprazole (PRILOSEC) 20 mg capsule Take one capsule by mouth once daily on an empty st
omach 90 capsule 4 
   predniSONE (DELTASONE) 5 mg tablet Take 1 tablet by mouth Daily. 90 tablet 3 
   Prenatal Multivit-Min-Fe-FA (PRENATAL VITAMINS) 0.8 MG TABS Take 0.8 mg by mouth Daily.
 30 each 11 
   Respiratory Therapy Supplies MISC Decrease CPAP to 12-18 cmH2O Diagnosis Code(s)327.23.
 Please send order to [facility]. 1 each 0 
 
   rosuvastatin (CRESTOR) 20 mg tablet Take 1 tablet by mouth nightly. 30 tablet 11 
   tacrolimus (PROGRAF) 0.5 mg capsule Take 1 capsule by mouth 2 times daily. With 1 mg to
 equal 1.5 mg bid (Patient taking differently: Take 0.5 mg by mouth 2 times daily. With 1 mg
 to = 1.5 mg bid) 60 capsule 11 
   tacrolimus (PROGRAF) 1 mg capsule Take 1 mg by mouth twice daily with 0.5 mg to equal 1
.5 mg twice daily 60 capsule 11 
 
 No current facility-administered medications for this visit. 
  
 
 ALLERGIES
 No Known Allergies
 
 ROS
 Review of Systems 
 Constitutional: Positive for malaise/fatigue (Not new). Negative for fever, chills, weight 
loss and diaphoresis. 
 HENT: Negative for hearing loss, nosebleeds and tinnitus.  
 Eyes: Positive for blurred vision (Cataracts). Negative for double vision. 
 Respiratory: Positive for shortness of breath. Negative for cough.  
 Cardiovascular: Positive for orthopnea (CPAP) and leg swelling. Negative for chest pain, pa
lpitations and claudication. 
 Gastrointestinal: Negative for heartburn, nausea, vomiting, abdominal pain, diarrhea, const
ipation, blood in stool and melena. 
 Genitourinary: Negative for urgency, frequency and hematuria. 
 Musculoskeletal: Positive for back pain and joint pain. Negative for myalgias, falls and ne
ck pain. 
 Skin: Negative for itching and rash. 
 Neurological: Positive for tremors and weakness. Negative for dizziness, tingling, seizures
, loss of consciousness and headaches. 
 Endo/Heme/Allergies: Positive for environmental allergies. Negative for polydipsia. Bruises
/bleeds easily. 
 Psychiatric/Behavioral: Negative for memory loss. The patient is not nervous/anxious and do
es not have insomnia.  
 
 OBJECTIVE:  
 
 PHYSICAL EXAM
 /76 mmHg | Pulse 64 | Resp 18 | Ht 1.676 m (5' 6") | Wt 124.286 kg (274 lb) | BMI 44.
25 kg/m2 
 Physical Exam 
 Constitutional: She appears well-developed and well-nourished. No distress. 
 Obese, female individual without acute distress. 
 Neck: Normal carotid pulses, no hepatojugular reflux and no JVD present. Carotid bruit is n
ot present. 
 Cardiovascular: Normal rate, regular rhythm, S1 normal, S2 normal, normal heart sounds, int
act distal pulses and normal pulses.  PMI is not displaced.  Exam reveals no gallop, no S3, 
no S4 and no friction rub.  
 No murmur heard.
 Pulses:
      Carotid pulses are 2+ on the right side, and 2+ on the left side.
      Dorsalis pedis pulses are 2+ on the right side, and 2+ on the left side. 
 Pulmonary/Chest: Effort normal and breath sounds normal. No accessory muscle usage. No resp
iratory distress. She has no wheezes. She has no rhonchi. She has no rales. 
 Abdominal: Normal appearance, normal aorta and bowel sounds are normal. She exhibits no abd
ominal bruit. There is no hepatosplenomegaly. There is no tenderness. 
 Musculoskeletal: She exhibits edema (trace bilateral ankle pitting edema.). 
 Neurological: She is alert. Gait normal. 
 Skin: Skin is warm and dry. 
 Psychiatric: She has a normal mood and affect. Her mood appears not anxious. She does not e
 
xhibit a depressed mood. 
 
 EC/8/15:  Sinus rhythm, normal ECG, rate 67 beats per minute.
 
 LAB RESULTS: 
 LIPID
 Lab Results 
 Component Value Date 
  CHOL 162 2013 
  TRIG 225 2013 
  HDL 45 2013 
  LDL 72 2013 
  LDLEX 69 2015 
  HDLEX 47.9 2015 
  TRIGEX 194* 2015 
  CHOLEX 156 2015 
 
 CHEMISTRY
 Lab Results 
 Component Value Date 
   2014 
  GLUEX 128* 2015 
   2014 
  NAEX 139 2015 
  K 5.4 2014 
  KEX 5.3* 2015 
   2014 
  CLEX 110 2015 
  CO2 22 2014 
  CO2EX 20 2015 
  CALCIUM 10.4 2014 
  ALKPHOS 100 2014 
  AST 20 07/10/2013 
  ASTEX 24 2015 
  ALT 14 07/10/2013 
  ALTEX 16 2015 
  BILITOT 0.6 2014 
  CREA 1.7 07/10/2013 
  BUN 40 2014 
  EGFR 31.0 07/10/2013 
  EGFREX 44 2015 
  CREEX 1.21 2015 
 
 HEMATOLOGY
 Lab Results 
 Component Value Date 
  WBC 4.6 07/10/2013 
  WBCEX 3.8* 2015 
  HGB 11.9 07/10/2013 
  HGBEX 13.3 2015 
  HCT 35.7 07/10/2013 
  HCTEX 39.9 2015 
  * 07/10/2013 
  PLTEX 155 2015 
 
 I reviewed records from PCP for office visit on 2015.
 
 ASSESSMENT:  
 
 1. Coronary artery disease:
 
 A.  Status post CABG x 3 in  with LIMA to the LAD, SVG to the OM1 and OM2.
             B.  A persantine sestamibi stress test on 07 revealed a medium sized, mod
erate in severity fixed defect of the anterior wall, and a small sized mild in severity fixe
d defect of the inferior wall, LVEF by gated SPECT was 66%.
  C.  Bilateral heart catheterization on 05/03/10, shows severe two vessel coronary artery d
isease, a chronic occlusion through the proximal portion of the left anterior descending art
adele and a chronic occlusion through the proximal portion of the left circumflex artery, rani
ts: open left internal mammary artery graft to the mid left anterior descending artery, open
 saphenous vein grafts to the OM1 and OM2, mildly dilated left ventricular cavity with a nor
mal left systolic function, LVEF 70%, mild to moderate pulmonary hypertension and a normal c
ardiac output, coronary circulation is right dominant, normal system pressure.
  D.  Persantine nuclear medicine stress test 14 shows a normal myocardial perfusion im
aging study with normal left ventricular size, wall thickness, LVEF by gated SPECT of 78%.  
  E.  Today she denies any chest pain. There is no signs and symptoms of overt congestive he
art failure.  She is in a class II of [place] Heart Association functional class.  There is
 trace lower extremity edema on physical examination. 
 
 2.  Right sided heart failure/ cor pulmonale / severe pulmonary hypertension:
             A. The echocardiogram from 02/19/10 revealed moderate bi-atrial dilatation and 
normal left ventricular size, wall thickness and motion, LVEF is 55-60%, dilated right ventr
icle with moderate hypo-kinesis, moderate tricuspid valve regurgitation, severe pulmonary hy
pertension with a peak systolic pressure of 80 mmHg, and a small pericardial effusion. 
  B. Echocardiogram from 4/15/14 showed Mild left atrial dilatation. Normal left ventricular
 size, wall thickness and motion. Preserved  left ventricular systolic function. LVEF is 70-
75%. Grade 1 left ventricular diastole dysfunction. Mild tricuspid valve regurgitation. Mild
 thickened trileaflet aortic valve with adequate opening. A mild aortic valve insufficiency.
 Normal right-sided pressure.
 
 3. Hypertension:
  A.  Today her blood pressure is well-controlled.
 
 4.  Hyperlipidemia.
 
 5.  Obstructive sleep apnea:
  A.  She uses a CPAP machine at night with 2 L of oxygen bled in.
 
 6.  Morbid obesity.
 
 7. Type II diabetes.
 
 8. Chronic kidney disease:
  A.  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 Followed by Dr. Moser.
 
 9.  DVT left leg 2015:
  A.  She is on apixaban, apparently indefinitely.  She has follow up with prescribing provi
chandler in January.
 
 PLAN:  
 
 She will continue with her current medical regimen. 
 She will address whether or not she still requires both ARB and ACE-I with nephrologist, Dr Nikkie Moser, at her next follow up appointment.
 She will follow up in 1 year, or sooner with concerns. 
 
 Electronically signed by: Abebe Townsend MD [facility] 2015 
 
 Portions of this chart may have been created with Dragon voice recognition software. Occasi
onal wrong-word or   sound-alike  substitutions may have occurred due to the inherent naqvi
itations of voice recognition software. Please read the chart carefully and recognize, using
 context, where these substitutions have occurred.
 
 ITonia ARNP, am acting as a scribe on behalf of, and in the presence of Abebe barragan MD. Electronically signed by Abebe Townsend MD at 2015  4:25 PM Bernardo arias in this encounter
 
 Plan of Treatment
 
 
+--------+-----------+------------+----------------------+-------------------+
| Date   | Type      | Specialty  | Care Team            | Description       |
+--------+-----------+------------+----------------------+-------------------+
| / | Office    | Cardiology |   Tonia Wilson,    |                   |
|    | Visit     |            | ARNP  [address]   |                   |
|        |           |            | [facility] [place]  |                   |
|        |           |            | [address] [phone]  |                   |
|        |           |            |  [phone] (Fax)  |                   |
+--------+-----------+------------+----------------------+-------------------+
| / | Hospital  | Radiology  |   Abebe Townsend, |                   |
|    | Encounter |            |  MD  [address] |                   |
|        |           |            |  [facility]   |                   |
|        |           |            | [place] [address]             |                   |
|        |           |            | [phone]         |                   |
|        |           |            | [phone] (Fax)   |                   |
+--------+-----------+------------+----------------------+-------------------+
| / | Surgery   | Radiology  |   AimeechidiAbebe, | CV EP PPM SYSTEM  |
2020   |           |            |  MD  [address] | IMPLANT           |
|        |           |            |  [place]   |                   |
|        |           |            | [place] [address]             |                   |
|        |           |            | [phone]         |                   |
|        |           |            | [phone] (Fax)   |                   |
+--------+-----------+------------+----------------------+-------------------+
| 02/10/ | Clinical  | Cardiology |                      |                   |
|    | Support   |            |                      |                   |
+--------+-----------+------------+----------------------+-------------------+
| / | Office    | Cardiology |   Tonia Wilson,    |                   |
2020   | Visit     |            | [facility]  [address]   |                   |
|        |           |            |   [place]  |                   |
|        |           |            | [address]  [phone]  |                   |
|        |           |            |  [phone] (Fax)  |                   |
+--------+-----------+------------+----------------------+-------------------+
| / | Off-Site  | Nephrology |   Marzena Moser  |                   |
2020   | Visit     |            | M, DO  [address]      |                   |
|        |           |            | [address]      |                   |
|        |           |            | [place]      |                   |
|        |           |            | [address]  [phone]  |                   |
|        |           |            |  [phone] (Fax)  |                   |
+--------+-----------+------------+----------------------+-------------------+
 documented as of this encounter
 
 Procedures
 
 
+----------------+--------+-------------+----------------------+----------------------+
| Procedure Name | Priori | Date/Time   | Associated Diagnosis | Comments             |
|                | ty     |             |                      |                      |
+----------------+--------+-------------+----------------------+----------------------+
| ECG 12 LEAD    | Routin | 2015  |   Murmur  Other      |   Results for this   |
|                | e      |  3:26 PM    | hyperlipidemia       | procedure are in the |
|                |        | PST         | Coronary artery      |  results section.    |
|                |        |             | disease involving    |                      |
|                |        |             | native artery of     |                      |
 
|                |        |             | transplanted heart   |                      |
|                |        |             | without angina       |                      |
|                |        |             | pectoris             |                      |
|                |        |             | Hypertension,        |                      |
|                |        |             | essential            |                      |
+----------------+--------+-------------+----------------------+----------------------+
 documented in this encounter
 
 Results
 ECG 12 lead (2015  3:26 PM PST)
 
+-------------+--------------------------+-----------+------------+--------------+
| Component   | Value                    | Ref Range | Performed  | Pathologist  |
|             |                          |           | At         | Signature    |
+-------------+--------------------------+-----------+------------+--------------+
| VENTRICULAR | 67                       | BPM       | [facility]  |              |
|  RATE EKG   |                          |           |            |              |
+-------------+--------------------------+-----------+------------+--------------+
| ATRIAL RATE | 67                       | BPM       | [facility]  |              |
+-------------+--------------------------+-----------+------------+--------------+
| P-R         | 190                      | ms        | [facility]  |              |
| INTERVAL    |                          |           |            |              |
+-------------+--------------------------+-----------+------------+--------------+
| QRS         | 82                       | ms        | [facility]  |              |
| DURATION    |                          |           |            |              |
+-------------+--------------------------+-----------+------------+--------------+
| Q-T         | 408                      | ms        | [facility]  |              |
| INTERVAL    |                          |           |            |              |
+-------------+--------------------------+-----------+------------+--------------+
| Q-T         | 431                      | ms        | [facility]  |              |
| INTERVAL    |                          |           |            |              |
| (CORRECTED) |                          |           |            |              |
+-------------+--------------------------+-----------+------------+--------------+
| QRS AXIS    | -44                      | degrees   | [facility]  |              |
+-------------+--------------------------+-----------+------------+--------------+
| T AXIS      | 63                       | degrees   | [facility]  |              |
+-------------+--------------------------+-----------+------------+--------------+
| INTERPRETAT | Normal sinus rhythmLeft  |           | [facility]  |              |
| ION TEXT    | axis deviationAbnormal   |           |            |              |
|             | ECGWhen compared with    |           |            |              |
|             | ECG of 08-DEC-2015       |           |            |              |
|             | 15:25, (Unconfirmed)No   |           |            |              |
|             | significant change was   |           |            |              |
|             | foundConfirmed by        |           |            |              |
|             | ABEBE TOWNSEND MD     |           |            |              |
|             | ([id number]) on 2015     |           |            |              |
|             | 5:27:44 PM               |           |            |              |
+-------------+--------------------------+-----------+------------+--------------+
 
 
 
+----------+
| Specimen |
+----------+
|          |
+----------+
 
 
 
+----------------------------------------------------------+--------------+
 
| Narrative                                                | Performed At |
+----------------------------------------------------------+--------------+
|   This result has an attachment that is not available.   |              |
+----------------------------------------------------------+--------------+
 
 
 
+--------------+---------+--------------------+--------------+
| Performing   | Address | City/State/Zipcode | Phone Number |
| Organization |         |                    |              |
+--------------+---------+--------------------+--------------+
|   [facility]  |         |                    |              |
+--------------+---------+--------------------+--------------+
 documented in this encounter
 
 Visit Diagnoses
 
 
+-----------------------------------------------------------------------------------------+
| Diagnosis                                                                               |
+-----------------------------------------------------------------------------------------+
|   Murmur - Primary  Undiagnosed cardiac murmurs                                         |
+-----------------------------------------------------------------------------------------+
|   Other hyperlipidemia                                                                  |
+-----------------------------------------------------------------------------------------+
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
| pectoris                                                                                |
+-----------------------------------------------------------------------------------------+
|   Hypertension, essential  Unspecified essential hypertension                           |
+-----------------------------------------------------------------------------------------+
 documented in this encounter

## 2020-01-08 NOTE — XMS
Encounter Summary
  Created on: 2020
 
 Viviana Ricci
 External Reference #: [id number]
 : 47
 Sex: Female
 
 Demographics
 
 
+-----------------------+----------------------+
| Address               | [address]      |
|                       | [place]  [address] |
+-----------------------+----------------------+
| Home Phone            | [phone]      |
+-----------------------+----------------------+
| Preferred Language    | Unknown              |
+-----------------------+----------------------+
| Marital Status        | Single               |
+-----------------------+----------------------+
| Sikh Affiliation | [id number]                 |
+-----------------------+----------------------+
| Race                  | Unknown              |
+-----------------------+----------------------+
| Ethnic Group          | Unknown              |
+-----------------------+----------------------+
 
 
 Author
 
 
+--------------+--------------------------------------------+
| Author       | [facility]  |
|              | and [place]                                |
+--------------+--------------------------------------------+
| Organization | [facility]  |
|              | and [place]                                |
+--------------+--------------------------------------------+
| Address      | Unknown                                    |
+--------------+--------------------------------------------+
| Phone        | Unavailable                                |
+--------------+--------------------------------------------+
 
 
 
 Support
 
 
+----------------+--------------+---------------------+-----------------+
| Name           | Relationship | Address             | Phone           |
+----------------+--------------+---------------------+-----------------+
| Ada/Ed Radhames | ECON         | [place]              | [phone] |
|                |              | [place]  |                 |
|                |              |  [address]              |                 |
+----------------+--------------+---------------------+-----------------+
 
 
 
 
 Care Team Providers
 
 
+-----------------------+------+-------------+
| Care Team Member Name | Role | Phone       |
+-----------------------+------+-------------+
 PCP  | Unavailable |
+-----------------------+------+-------------+
 
 
 
 Reason for Visit
 
 
+-------------------+----------+
| Reason            | Comments |
+-------------------+----------+
| Medication Refill |          |
+-------------------+----------+
 
 
 
 Encounter Details
 
 
+--------+--------+---------------------+----------------------+-------------------+
| Date   | Type   | Department          | Care Team            | Description       |
+--------+--------+---------------------+----------------------+-------------------+
| / | Refill |   [place]         |   Marzena Moser  | Medication Refill |
| 2016   |        | NEPHROLOGY  [address]   | M, DO  [address]      |                   |
|        |        | [address]   | [address]      |                   |
|        |        | [place], [place]     | [place], [place]      |                   |
|        |        | [address]          | [address]  [phone]  |                   |
|        |        | [phone]        |  [phone] (Fax)  |                   |
+--------+--------+---------------------+----------------------+-------------------+
 
 
 
 Social History
 
 
+--------------+-------+-----------+--------+------+
| Tobacco Use  | Types | Packs/Day | Years  | Date |
|              |       |           | Used   |      |
+--------------+-------+-----------+--------+------+
| Never Smoker |       |           |        |      |
+--------------+-------+-----------+--------+------+
 
 
 
+---------------------+---+---+---+
| Smokeless Tobacco:  |   |   |   |
| Never Used          |   |   |   |
+---------------------+---+---+---+
 
 
 
+---------------------------------------------------------------+
| Comments: some second hand smoke exposure, but fairly minimal |
 
+---------------------------------------------------------------+
 
 
 
+-------------+-------------+---------+----------+
| Alcohol Use | Drinks/Week | oz/Week | Comments |
+-------------+-------------+---------+----------+
| No          |             |         |          |
+-------------+-------------+---------+----------+
 
 
 
+------------------+---------------+
| Sex Assigned at  | Date Recorded |
| Birth            |               |
+------------------+---------------+
| Not on file      |               |
+------------------+---------------+
 
 
 
+----------------+-------------+-------------+
| Job Start Date | Occupation  | Industry    |
+----------------+-------------+-------------+
| Not on file    | Not on file | Not on file |
+----------------+-------------+-------------+
 
 
 
+----------------+--------------+------------+
| Travel History | Travel Start | Travel End |
+----------------+--------------+------------+
 
 
 
+-------------------------------------+
| No recent travel history available. |
+-------------------------------------+
 documented as of this encounter
 
 Plan of Treatment
 
 
+--------+-----------+------------+----------------------+-------------------+
| Date   | Type      | Specialty  | Care Team            | Description       |
+--------+-----------+------------+----------------------+-------------------+
| / | Office    | Cardiology |   HellalfredoTonia,    |                   |
|    | Visit     |            | ARNP  [address]   |                   |
|        |           |            | St  [place], [place]  |                   |
|        |           |            | [address]  [phone]  |                   |
|        |           |            |  [phone] (Fax)  |                   |
+--------+-----------+------------+----------------------+-------------------+
| / | Hospital  | Radiology  |   Popeye Townsend, |                   |
|    | Encounter |            |  MD  [address] |                   |
|        |           |            |  St.  [place],   |                   |
|        |           |            | [place] [address]             |                   |
|        |           |            | [phone]         |                   |
|        |           |            | [phone] (Fax)   |                   |
+--------+-----------+------------+----------------------+-------------------+
| / | Surgery   | Radiology  |   Popeye Townsend, | CV EP PPM SYSTEM  |
 
|    |           |            |  MD  [address] | IMPLANT           |
|        |           |            |  St.  [place],   |                   |
|        |           |            | [place] [address]             |                   |
|        |           |            | [phone]         |                   |
|        |           |            | [phone] (Fax)   |                   |
+--------+-----------+------------+----------------------+-------------------+
| 02/10/ | Clinical  | Cardiology |                      |                   |
|    | Support   |            |                      |                   |
+--------+-----------+------------+----------------------+-------------------+
| / | Office    | Cardiology |   Tonia Wilson,    |                   |
|    | Visit     |            | ARNP  [address]   |                   |
|        |           |            | [place]  |                   |
|        |           |            | [address]  [phone]  |                   |
|        |           |            |  [phone] (Fax)  |                   |
+--------+-----------+------------+----------------------+-------------------+
| / | Off-Site  | Nephrology |   Marzena Moser  |                   |
|    | Visit     |            | DO ETIENNE  [address] [place] [address]      |                   |
|        |           |            | [address]      |                   |
|        |           |            | [place]      |                   |
|        |           |            | [address]  [phone]  |                   |
|        |           |            |  [phone] (Fax)  |                   |
+--------+-----------+------------+----------------------+-------------------+
 documented as of this encounter
 
 Visit Diagnoses
 Not on filedocumented in this encounter

## 2020-01-08 NOTE — XMS
Encounter Summary
  Created on: 2020
 
 Viviana Ricci
 External Reference #: [id number]
 : 47
 Sex: Female
 
 Demographics
 
 
+-----------------------+----------------------+
| Address               | [address]      |
|                       | [place]  [address] |
+-----------------------+----------------------+
| Home Phone            | [phone]      |
+-----------------------+----------------------+
| Preferred Language    | Unknown              |
+-----------------------+----------------------+
| Marital Status        | Single               |
+-----------------------+----------------------+
| Worship Affiliation | [id number]                 |
+-----------------------+----------------------+
| Race                  | Unknown              |
+-----------------------+----------------------+
| Ethnic Group          | Unknown              |
+-----------------------+----------------------+
 
 
 Author
 
 
+--------------+--------------------------------------------+
| Author       | [facility]  |
|              | and [place]                                |
+--------------+--------------------------------------------+
| Organization | [facility]  |
|              | and [place]                                |
+--------------+--------------------------------------------+
| Address      | Unknown                                    |
+--------------+--------------------------------------------+
| Phone        | Unavailable                                |
+--------------+--------------------------------------------+
 
 
 
 Support
 
 
+----------------+--------------+---------------------+-----------------+
| Name           | Relationship | Address             | Phone           |
+----------------+--------------+---------------------+-----------------+
| Ada/Ed Radhames | ECON         | [address]              | [phone] |
|                |              | [place]  |                 |
|                |              |  [address]              |                 |
+----------------+--------------+---------------------+-----------------+
 
 
 
 
 Care Team Providers
 
 
+-----------------------+------+-------------+
| Care Team Member Name | Role | Phone       |
+-----------------------+------+-------------+
 PCP  | Unavailable |
+-----------------------+------+-------------+
 
 
 
 Encounter Details
 
 
+--------+----------+---------------------+----------------------+-------------+
| Date   | Type     | Department          | Care Team            | Description |
+--------+----------+---------------------+----------------------+-------------+
| / | Abstract |   [facility] [place]         |   Marzena Moser  |             |
|    |          | NEPHROLOGY  [address]   | M, DO  [address]      |             |
|        |          | [address]   | [address]      |             |
|        |          | [place], [place]     | [place]      |             |
|        |          | [address]          | [address]  [phone]  |             |
|        |          | [phone]        |  [phone] (Fax)  |             |
+--------+----------+---------------------+----------------------+-------------+
 
 
 
 Social History
 
 
+--------------+-------+-----------+--------+------+
| Tobacco Use  | Types | Packs/Day | Years  | Date |
|              |       |           | Used   |      |
+--------------+-------+-----------+--------+------+
| Never Smoker |       |           |        |      |
+--------------+-------+-----------+--------+------+
 
 
 
+---------------------+---+---+---+
| Smokeless Tobacco:  |   |   |   |
| Never Used          |   |   |   |
+---------------------+---+---+---+
 
 
 
+---------------------------------------------------------------+
| Comments: some second hand smoke exposure, but fairly minimal |
+---------------------------------------------------------------+
 
 
 
+-------------+-------------+---------+----------+
| Alcohol Use | Drinks/Week | oz/Week | Comments |
+-------------+-------------+---------+----------+
| No          |             |         |          |
+-------------+-------------+---------+----------+
 
 
 
 
+------------------+---------------+
| Sex Assigned at  | Date Recorded |
| Birth            |               |
+------------------+---------------+
| Not on file      |               |
+------------------+---------------+
 
 
 
+----------------+-------------+-------------+
| Job Start Date | Occupation  | Industry    |
+----------------+-------------+-------------+
| Not on file    | Not on file | Not on file |
+----------------+-------------+-------------+
 
 
 
+----------------+--------------+------------+
| Travel History | Travel Start | Travel End |
+----------------+--------------+------------+
 
 
 
+-------------------------------------+
| No recent travel history available. |
+-------------------------------------+
 documented as of this encounter
 
 Plan of Treatment
 
 
+--------+-----------+------------+----------------------+-------------------+
| Date   | Type      | Specialty  | Care Team            | Description       |
+--------+-----------+------------+----------------------+-------------------+
| / | Office    | Cardiology |   Tonia Wilson,    |                   |
|    | Visit     |            | ARNJESSICA  [address]   |                   |
|        |           |            | [place]  |                   |
|        |           |            | [address]  [phone]  |                   |
|        |           |            |  [phone] (Fax)  |                   |
+--------+-----------+------------+----------------------+-------------------+
| / | Hospital  | Radiology  |   Popeye Townsend, |                   |
|    | Encounter |            |  MD  [address] |                   |
|        |           |            |  St.  [place],   |                   |
|        |           |            | [place] [address]             |                   |
|        |           |            | [phone]         |                   |
|        |           |            | [phone] (Fax)   |                   |
+--------+-----------+------------+----------------------+-------------------+
| / | Surgery   | Radiology  |   Popeye Townsend, | CV EP PPM SYSTEM  |
|    |           |            |  MD  [address] | IMPLANT           |
|        |           |            |  St.  [place],   |                   |
|        |           |            | [place] [address]             |                   |
|        |           |            | [phone]         |                   |
|        |           |            | [phone] (Fax)   |                   |
+--------+-----------+------------+----------------------+-------------------+
| 02/10/ | Clinical  | Cardiology |                      |                   |
|    | Support   |            |                      |                   |
+--------+-----------+------------+----------------------+-------------------+
| / | Office    | Cardiology |   Tonia Wilson,    |                   |
|    | Visit     |            | ARNP  [address]   |                   |
 
|        |           |            | St  [place], [place]  |                   |
|        |           |            | [address] [phone]  |                   |
|        |           |            |  [phone] (Fax)  |                   |
+--------+-----------+------------+----------------------+-------------------+
| / | Off-Site  | Nephrology |   Marzena Moser  |                   |
| 2020   | Visit     |            | DO ETIENNE  [address] [place] [address]      |                   |
|        |           |            | [address]      |                   |
|        |           |            | [place]      |                   |
|        |           |            | [id number] [phone]  |                   |
|        |           |            |  [phone] (Fax)  |                   |
+--------+-----------+------------+----------------------+-------------------+
 documented as of this encounter
 
 Visit Diagnoses
 Not on filedocumented in this encounter

## 2020-01-08 NOTE — XMS
Encounter Summary
  Created on: 2020
 
 Viviana Ricci
 External Reference #: [id number]
 : 47
 Sex: Female
 
 Demographics
 
 
+-----------------------+----------------------+
| Address               | [address]      |
|                       | [place]  [address] |
+-----------------------+----------------------+
| Home Phone            | [phone]      |
+-----------------------+----------------------+
| Preferred Language    | Unknown              |
+-----------------------+----------------------+
| Marital Status        | Single               |
+-----------------------+----------------------+
| Evangelical Affiliation | [id number]                 |
+-----------------------+----------------------+
| Race                  | Unknown              |
+-----------------------+----------------------+
| Ethnic Group          | Unknown              |
+-----------------------+----------------------+
 
 
 Author
 
 
+--------------+--------------------------------------------+
| Author       | [facility]  |
|              | and [facility]                                |
+--------------+--------------------------------------------+
| Organization | [facility]  |
|              | and [facility]                                |
+--------------+--------------------------------------------+
| Address      | Unknown                                    |
+--------------+--------------------------------------------+
| Phone        | Unavailable                                |
+--------------+--------------------------------------------+
 
 
 
 Support
 
 
+----------------+--------------+---------------------+-----------------+
| Name           | Relationship | Address             | Phone           |
+----------------+--------------+---------------------+-----------------+
| Ada/Ed Radhames | ECON         | [place]              | [phone] |
|                |              | [place], [place]  |                 |
|                |              |  [address]              |                 |
+----------------+--------------+---------------------+-----------------+
 
 
 
 
 Care Team Providers
 
 
+-----------------------+------+-------------+
| Care Team Member Name | Role | Phone       |
+-----------------------+------+-------------+
 PCP  | Unavailable |
+-----------------------+------+-------------+
 
 
 
 Encounter Details
 
 
+--------+-----------+----------------------+-----------+-------------+
| Date   | Type      | Department           | Care Team | Description |
+--------+-----------+----------------------+-----------+-------------+
| / | Hospital  |   [facility] |           |             |
|    | Encounter |  MED CTR XRAY  [address]           |             |
|        |           [address]  [place]       |           |             |
|        |           | [place] [id number] |           |             |
|        |           |   [phone]       |           |             |
+--------+-----------+----------------------+-----------+-------------+
 
 
 
 Social History
 
 
+----------------+-------+-----------+--------+------+
| Tobacco Use    | Types | Packs/Day | Years  | Date |
|                |       |           | Used   |      |
+----------------+-------+-----------+--------+------+
| Never Assessed |       |           |        |      |
+----------------+-------+-----------+--------+------+
 
 
 
+------------------+---------------+
| Sex Assigned at  | Date Recorded |
| Birth            |               |
+------------------+---------------+
| Not on file      |               |
+------------------+---------------+
 
 
 
+----------------+-------------+-------------+
| Job Start Date | Occupation  | Industry    |
+----------------+-------------+-------------+
| Not on file    | Not on file | Not on file |
+----------------+-------------+-------------+
 
 
 
+----------------+--------------+------------+
| Travel History | Travel Start | Travel End |
+----------------+--------------+------------+
 
 
 
 
+-------------------------------------+
| No recent travel history available. |
+-------------------------------------+
 documented as of this encounter
 
 Plan of Treatment
 
 
+--------+-----------+------------+----------------------+-------------------+
| Date   | Type      | Specialty  | Care Team            | Description       |
+--------+-----------+------------+----------------------+-------------------+
| / | Office    | Cardiology |   Tonia Wilson,    |                   |
|    | Visit     |            | ARNP  [address]   |                   |
|        |           |            | St  [facility], [place]  |                   |
|        |           |            | [address]  [phone]  |                   |
|        |           |            |  [phone] (Fax)  |                   |
+--------+-----------+------------+----------------------+-------------------+
| / | Hospital  | Radiology  |   Popeye Townsend, |                   |
|    | Encounter |            |  MD  [address] |                   |
|        |           |            |  St.  [place],   |                   |
|        |           |            | [place] [address]             |                   |
|        |           |            | [phone]         |                   |
|        |           |            | [phone] (Fax)   |                   |
+--------+-----------+------------+----------------------+-------------------+
| / | Surgery   | Radiology  |   Popeye Townsend, | CV EP PPM SYSTEM  |
| 2020   |           |            |  MD  [address] | IMPLANT           |
|        |           |            |  St.  [place],   |                   |
|        |           |            | [place] [address]             |                   |
|        |           |            | [phone]         |                   |
|        |           |            | [phone] (Fax)   |                   |
+--------+-----------+------------+----------------------+-------------------+
| 02/10/ | Clinical  | Cardiology |                      |                   |
|    | Support   |            |                      |                   |
+--------+-----------+------------+----------------------+-------------------+
| / | Office    | Cardiology |   Hellberg, Tonia,    |                   |
|    | Visit     |            | [facility]  [address]   |                   |
|        |           |            | [place]  |                   |
|        |           |            | [address]  [phone]  |                   |
|        |           |            |  [phone] (Fax)  |                   |
+--------+-----------+------------+----------------------+-------------------+
| / | Off-Site  | Nephrology |   Marzena Moser  |                   |
|    | Visit     |            | M, DO  [address]      |                   |
|        |           |            | [address]      |                   |
|        |           |            | [place]      |                   |
|        |           |            | [address]  [phone]  |                   |
|        |           |            |  [phone] (Fax)  |                   |
+--------+-----------+------------+----------------------+-------------------+
 documented as of this encounter
 
 Visit Diagnoses
 Not on filedocumented in this encounter

## 2020-01-08 NOTE — XMS
Encounter Summary
  Created on: 2020
 
 Viviana Ricci
 External Reference #: [id number]
 : 47
 Sex: Female
 
 Demographics
 
 
+-----------------------+----------------------+
| Address               | [address]      |
|                       | [place]  [address] |
+-----------------------+----------------------+
| Home Phone            | [phone]      |
+-----------------------+----------------------+
| Preferred Language    | Unknown              |
+-----------------------+----------------------+
| Marital Status        | Single               |
+-----------------------+----------------------+
| Mu-ism Affiliation | [id number]                 |
+-----------------------+----------------------+
| Race                  | Unknown              |
+-----------------------+----------------------+
| Ethnic Group          | Unknown              |
+-----------------------+----------------------+
 
 
 Author
 
 
+--------------+--------------------------------------------+
| Author       | [facility]  |
|              | and [place]                                |
+--------------+--------------------------------------------+
| Organization | [facility]  |
|              | and [place]                                |
+--------------+--------------------------------------------+
| Address      | Unknown                                    |
+--------------+--------------------------------------------+
| Phone        | Unavailable                                |
+--------------+--------------------------------------------+
 
 
 
 Support
 
 
+----------------+--------------+---------------------+-----------------+
| Name           | Relationship | Address             | Phone           |
+----------------+--------------+---------------------+-----------------+
| Ada/Ed Radhames | ECON         | [address]              | [phone] |
|                |              | [place]  |                 |
|                |              |  [address]              |                 |
+----------------+--------------+---------------------+-----------------+
 
 
 
 
 Care Team Providers
 
 
+------------------------+------+-----------------+
| Care Team Member Name  | Role | Phone           |
+------------------------+------+-----------------+
| Marzena Moser DO | PCP  | [phone] |
+------------------------+------+-----------------+
 
 
 
 Reason for Visit
 
 
+-------------+----------+
| Reason      | Comments |
+-------------+----------+
| Medication  |          |
| Management  |          |
+-------------+----------+
 
 
 
 Encounter Details
 
 
+--------+-----------+---------------------+----------------------+-------------+
| Date   | Type      | Department          | Care Team            | Description |
+--------+-----------+---------------------+----------------------+-------------+
| 08/15/ | Telephone |   [facility]         |   ChengmaxxMarzena  | Medication  |
| 2019   |           | NEPHROLOGY  [address]   | M, DO  [address]      | Management  |
|        |           | [address]   | [address]      |             |
|        |           | [place], [place]     | [place], [place]      |             |
|        |           | [address]          | [address]  [phone]  |             |
|        |           | [phone]        |  [phone] (Fax)  |             |
+--------+-----------+---------------------+----------------------+-------------+
 
 
 
 Social History
 
 
+--------------+-------+-----------+--------+------+
| Tobacco Use  | Types | Packs/Day | Years  | Date |
|              |       |           | Used   |      |
+--------------+-------+-----------+--------+------+
| Never Smoker |       |           |        |      |
+--------------+-------+-----------+--------+------+
 
 
 
+---------------------+---+---+---+
| Smokeless Tobacco:  |   |   |   |
| Never Used          |   |   |   |
+---------------------+---+---+---+
 
 
 
+---------------------------------------------------------------+
 
| Comments: some second hand smoke exposure, but fairly minimal |
+---------------------------------------------------------------+
 
 
 
+-------------+-------------+---------+----------+
| Alcohol Use | Drinks/Week | oz/Week | Comments |
+-------------+-------------+---------+----------+
| No          |             |         |          |
+-------------+-------------+---------+----------+
 
 
 
+------------------+---------------+
| Sex Assigned at  | Date Recorded |
| Birth            |               |
+------------------+---------------+
| Not on file      |               |
+------------------+---------------+
 
 
 
+----------------+-------------+-------------+
| Job Start Date | Occupation  | Industry    |
+----------------+-------------+-------------+
| Not on file    | Not on file | Not on file |
+----------------+-------------+-------------+
 
 
 
+----------------+--------------+------------+
| Travel History | Travel Start | Travel End |
+----------------+--------------+------------+
 
 
 
+-------------------------------------+
| No recent travel history available. |
+-------------------------------------+
 documented as of this encounter
 
 Plan of Treatment
 
 
+--------+-----------+------------+----------------------+-------------------+
| Date   | Type      | Specialty  | Care Team            | Description       |
+--------+-----------+------------+----------------------+-------------------+
| / | Office    | Cardiology |   RakeshmaxxArlen fuentesa,    |                   |
|    | Visit     |            | ARNP  [address]   |                   |
|        |           |            | St [place], [place]  |                   |
|        |           |            | [address]  [phone]  |                   |
|        |           |            |  [phone] (Fax)  |                   |
+--------+-----------+------------+----------------------+-------------------+
| / | Hospital  | Radiology  |   Popeye Townsend, |                   |
|    | Encounter |            |  MD  [address] |                   |
|        |           |            |  [place],   |                   |
|        |           |            | [place] [address]             |                   |
|        |           |            | [phone]         |                   |
|        |           |            | [phone] (Fax)   |                   |
+--------+-----------+------------+----------------------+-------------------+
 
| / | Surgery   | Radiology  |   Popeye Townsend, | CV EP PPM SYSTEM  |
|    |           |            |  MD  [address] | IMPLANT           |
|        |           |            |  St.  [place],   |                   |
|        |           |            | [place] [address]             |                   |
|        |           |            | [phone]         |                   |
|        |           |            | [phone] (Fax)   |                   |
+--------+-----------+------------+----------------------+-------------------+
| 02/10/ | Clinical  | Cardiology |                      |                   |
|    | Support   |            |                      |                   |
+--------+-----------+------------+----------------------+-------------------+
| / | Office    | Cardiology |   Tonia Wilson,    |                   |
|    | Visit     |            | ARNP  [address]   |                   |
|        |           |            | [place]  |                   |
|        |           |            | [address]  [phone]  |                   |
|        |           |            |  [phone] (Fax)  |                   |
+--------+-----------+------------+----------------------+-------------------+
| / | Off-Site  | Nephrology |   Marzena Moser  |                   |
|    | Visit     |            | M, DO  [address]      |                   |
|        |           |            | [address]      |                   |
|        |           |            | [place]      |                   |
|        |           |            | [address] [phone]  |                   |
|        |           |            |  [phone] (Fax)  |                   |
+--------+-----------+------------+----------------------+-------------------+
 documented as of this encounter
 
 Visit Diagnoses
 Not on filedocumented in this encounter

## 2020-01-08 NOTE — XMS
Encounter Summary
  Created on: 2020
 
 Viviana Ricci
 External Reference #: [id number]
 : 47
 Sex: Female
 
 Demographics
 
 
+-----------------------+----------------------+
| Address               | [address]      |
|                       | [place]  [address] |
+-----------------------+----------------------+
| Home Phone            | [phone]      |
+-----------------------+----------------------+
| Preferred Language    | Unknown              |
+-----------------------+----------------------+
| Marital Status        | Single               |
+-----------------------+----------------------+
| Mandaeism Affiliation | [id number]                 |
+-----------------------+----------------------+
| Race                  | Unknown              |
+-----------------------+----------------------+
| Ethnic Group          | Unknown              |
+-----------------------+----------------------+
 
 
 Author
 
 
+--------------+--------------------------------------------+
| Author       | [facility]  |
|              | and [place]                                |
+--------------+--------------------------------------------+
| Organization | [facility]  |
|              | and [place]                                |
+--------------+--------------------------------------------+
| Address      | Unknown                                    |
+--------------+--------------------------------------------+
| Phone        | Unavailable                                |
+--------------+--------------------------------------------+
 
 
 
 Support
 
 
+----------------+--------------+---------------------+-----------------+
| Name           | Relationship | Address             | Phone           |
+----------------+--------------+---------------------+-----------------+
| Ada/Ed Radhames | ECON         | [address]              | [phone] |
|                |              | [place]  |                 |
|                |              |  [address]              |                 |
+----------------+--------------+---------------------+-----------------+
 
 
 
 
 Care Team Providers
 
 
+------------------------+------+-----------------+
| Care Team Member Name  | Role | Phone           |
+------------------------+------+-----------------+
| Marzena Moser DO | PCP  | [phone] |
+------------------------+------+-----------------+
 
 
 
 Reason for Visit
 
 
+-------------+----------+
| Reason      | Comments |
+-------------+----------+
| Lab Results |          |
+-------------+----------+
 
 
 
 Encounter Details
 
 
+--------+-----------+---------------------+----------------------+-------------+
| Date   | Type      | Department          | Care Team            | Description |
+--------+-----------+---------------------+----------------------+-------------+
| / | Telephone |   [facility]         |   Marzena Moser  | Lab Results |
| 2018   |           | NEPHROLOGY  [address]   | M, DO  [address]      |             |
|        |           | [address]   | [address]      |             |
|        |           | [place], [place]     | [place], [place]      |             |
|        |           | [address]          | [address]  [phone]  |             |
|        |           | [phone]        |  [phone] (Fax)  |             |
+--------+-----------+---------------------+----------------------+-------------+
 
 
 
 Social History
 
 
+--------------+-------+-----------+--------+------+
| Tobacco Use  | Types | Packs/Day | Years  | Date |
|              |       |           | Used   |      |
+--------------+-------+-----------+--------+------+
| Never Smoker |       |           |        |      |
+--------------+-------+-----------+--------+------+
 
 
 
+---------------------+---+---+---+
| Smokeless Tobacco:  |   |   |   |
| Never Used          |   |   |   |
+---------------------+---+---+---+
 
 
 
+---------------------------------------------------------------+
| Comments: some second hand smoke exposure, but fairly minimal |
 
+---------------------------------------------------------------+
 
 
 
+-------------+-------------+---------+----------+
| Alcohol Use | Drinks/Week | oz/Week | Comments |
+-------------+-------------+---------+----------+
| No          |             |         |          |
+-------------+-------------+---------+----------+
 
 
 
+------------------+---------------+
| Sex Assigned at  | Date Recorded |
| Birth            |               |
+------------------+---------------+
| Not on file      |               |
+------------------+---------------+
 
 
 
+----------------+-------------+-------------+
| Job Start Date | Occupation  | Industry    |
+----------------+-------------+-------------+
| Not on file    | Not on file | Not on file |
+----------------+-------------+-------------+
 
 
 
+----------------+--------------+------------+
| Travel History | Travel Start | Travel End |
+----------------+--------------+------------+
 
 
 
+-------------------------------------+
| No recent travel history available. |
+-------------------------------------+
 documented as of this encounter
 
 Plan of Treatment
 
 
+--------+-----------+------------+----------------------+-------------------+
| Date   | Type      | Specialty  | Care Team            | Description       |
+--------+-----------+------------+----------------------+-------------------+
| / | Office    | Cardiology |   Tonia Wilson,    |                   |
|    | Visit     |            | [facility]  [address]   |                   |
|        |           |            | St  [place], [place]  |                   |
|        |           |            | [address]  [phone]  |                   |
|        |           |            |  [phone] (Fax)  |                   |
+--------+-----------+------------+----------------------+-------------------+
| / | Hospital  | Radiology  |   Popeye Townsend, |                   |
|    | Encounter |            |  MD  [address] |                   |
|        |           |            |  St.  [place],   |                   |
|        |           |            | [place] [address]             |                   |
|        |           |            | [phone]         |                   |
|        |           |            | [phone] (Fax)   |                   |
+--------+-----------+------------+----------------------+-------------------+
| / | Surgery   | Radiology  |   Popeye Townsend, | CV EP PPM SYSTEM  |
 
|    |           |            |  MD  [address] | IMPLANT           |
|        |           |            |  St.  [place],   |                   |
|        |           |            | [place] [address]             |                   |
|        |           |            | [phone]         |                   |
|        |           |            | [phone] (Fax)   |                   |
+--------+-----------+------------+----------------------+-------------------+
| 02/10/ | Clinical  | Cardiology |                      |                   |
|    | Support   |            |                      |                   |
+--------+-----------+------------+----------------------+-------------------+
| / | Office    | Cardiology |   Tonia Wilson,    |                   |
|    | Visit     |            | [facility]  [address]   |                   |
|        |           |            | [place]  |                   |
|        |           |            | [address]  [phone]  |                   |
|        |           |            |  [phone] (Fax)  |                   |
+--------+-----------+------------+----------------------+-------------------+
| / | Off-Site  | Nephrology |   Marezna Moser  |                   |
|    | Visit     |            | DO ETIENNE  [address]      |                   |
|        |           |            | [address]      |                   |
|        |           |            | [place]      |                   |
|        |           |            | [address]  [phone]  |                   |
|        |           |            |  [phone] (Fax)  |                   |
+--------+-----------+------------+----------------------+-------------------+
 documented as of this encounter
 
 Procedures
 
 
+------------------+--------+------------+----------------------+----------------------+
| Procedure Name   | Priori | Date/Time  | Associated Diagnosis | Comments             |
|                  | ty     |            |                      |                      |
+------------------+--------+------------+----------------------+----------------------+
| CULTURE, URINE,  | Routin | 2018 |                      |   Results for this   |
| REFLEXIVE        | e      |            |                      | procedure are in the |
|                  |        |            |                      |  results section.    |
+------------------+--------+------------+----------------------+----------------------+
 documented in this encounter
 
 Results
 Culture, Urine, Reflexive (2018)
 
+----------+
| Specimen |
+----------+
| Urine    |
+----------+
 
 
 
+-----------------------------------------------------------------+--------------+
| Narrative                                                       | Performed At |
+-----------------------------------------------------------------+--------------+
|   18- Over 100,000 CFU/mL Lactose  identified as  |              |
| Escherichia coli.                                               |              |
+-----------------------------------------------------------------+--------------+
 
 
 
+------------------+------------------+--------+----------------+
| Organism         | Antibiotic       | Method | Susceptibility |
+------------------+------------------+--------+----------------+
 
| Escherichia coli | Ampicillin       |        |   Sensitive    |
+------------------+------------------+--------+----------------+
| Escherichia coli | Amoxicillin +    |        |   Sensitive    |
|                  | Clavulanate      |        |                |
+------------------+------------------+--------+----------------+
| Escherichia coli | Piperacillin +   |        |   Sensitive    |
|                  | Tazobactam       |        |                |
+------------------+------------------+--------+----------------+
| Escherichia coli | Cefazolin        |        |   Sensitive    |
+------------------+------------------+--------+----------------+
| Escherichia coli | Ceftriaxone      |        |   Sensitive    |
+------------------+------------------+--------+----------------+
| Escherichia coli | Cefepime         |        |   Sensitive    |
+------------------+------------------+--------+----------------+
| Escherichia coli | Aztreonam        |        |   Sensitive    |
+------------------+------------------+--------+----------------+
| Escherichia coli | Ertapenem        |        |   Sensitive    |
+------------------+------------------+--------+----------------+
| Escherichia coli | Imipenem         |        |   Sensitive    |
+------------------+------------------+--------+----------------+
| Escherichia coli | Meropenem        |        |   Sensitive    |
+------------------+------------------+--------+----------------+
| Escherichia coli | Gentamicin       |        |   Sensitive    |
+------------------+------------------+--------+----------------+
| Escherichia coli | Ciprofloxacin    |        |   Sensitive    |
+------------------+------------------+--------+----------------+
| Escherichia coli | Levofloxacin     |        |   Sensitive    |
+------------------+------------------+--------+----------------+
| Escherichia coli | Tetracycline     |        |   Sensitive    |
+------------------+------------------+--------+----------------+
| Escherichia coli | Nitrofurantoin   |        |   Sensitive    |
+------------------+------------------+--------+----------------+
| Escherichia coli | Trimethoprim +   |        |   Sensitive    |
|                  | Sulfamethoxazole |        |                |
+------------------+------------------+--------+----------------+
 documented in this encounter
 
 Visit Diagnoses
 Not on filedocumented in this encounter

## 2020-01-08 NOTE — XMS
Encounter Summary
  Created on: 2020
 
 Viviana Ricci
 External Reference #: [id number]
 : 47
 Sex: Female
 
 Demographics
 
 
+-----------------------+----------------------+
| Address               | [address]      |
|                       | [place]  [address] |
+-----------------------+----------------------+
| Home Phone            | [phone]      |
+-----------------------+----------------------+
| Preferred Language    | Unknown              |
+-----------------------+----------------------+
| Marital Status        | Single               |
+-----------------------+----------------------+
| Judaism Affiliation | 1009                 |
+-----------------------+----------------------+
| Race                  | Unknown              |
+-----------------------+----------------------+
| Ethnic Group          | Unknown              |
+-----------------------+----------------------+
 
 
 Author
 
 
+--------------+--------------------------------------------+
| Author       | [facility]  |
|              | and [place]                                |
+--------------+--------------------------------------------+
| Organization | [facility]  |
|              | and [place]                                |
+--------------+--------------------------------------------+
| Address      | Unknown                                    |
+--------------+--------------------------------------------+
| Phone        | Unavailable                                |
+--------------+--------------------------------------------+
 
 
 
 Support
 
 
+----------------+--------------+---------------------+-----------------+
| Name           | Relationship | Address             | Phone           |
+----------------+--------------+---------------------+-----------------+
| Ada/Ed Radhames | ECON         | [address]              | [phone] |
|                |              | [place]  |                 |
|                |              |  [address]              |                 |
+----------------+--------------+---------------------+-----------------+
 
 
 
 
 Care Team Providers
 
 
+-----------------------+------+-------------+
| Care Team Member Name | Role | Phone       |
+-----------------------+------+-------------+
 PCP  | Unavailable |
+-----------------------+------+-------------+
 
 
 
 Encounter Details
 
 
+--------+----------+----------------------+----------------------+-------------+
| Date   | Type     | Department           | Care Team            | Description |
+--------+----------+----------------------+----------------------+-------------+
| 10/10/ | Abstract |   [place] [facility]  |   [address]   |             |
|    |          | Conversion Location  | [address]   |             |
|        |          |  [phone]        | [place]      |             |
|        |          |                      | [address]  [phone]  |             |
|        |          |                      |  [phone] (Fax)  |             |
+--------+----------+----------------------+----------------------+-------------+
 
 
 
 Social History
 
 
+----------------+-------+-----------+--------+------+
| Tobacco Use    | Types | Packs/Day | Years  | Date |
|                |       |           | Used   |      |
+----------------+-------+-----------+--------+------+
| Never Assessed |       |           |        |      |
+----------------+-------+-----------+--------+------+
 
 
 
+------------------+---------------+
| Sex Assigned at  | Date Recorded |
| Birth            |               |
+------------------+---------------+
| Not on file      |               |
+------------------+---------------+
 
 
 
+----------------+-------------+-------------+
| Job Start Date | Occupation  | Industry    |
+----------------+-------------+-------------+
| Not on file    | Not on file | Not on file |
+----------------+-------------+-------------+
 
 
 
+----------------+--------------+------------+
| Travel History | Travel Start | Travel End |
+----------------+--------------+------------+
 
 
 
 
+-------------------------------------+
| No recent travel history available. |
+-------------------------------------+
 documented as of this encounter
 
 Plan of Treatment
 
 
+--------+-----------+------------+----------------------+-------------------+
| Date   | Type      | Specialty  | Care Team            | Description       |
+--------+-----------+------------+----------------------+-------------------+
| / | Office    | Cardiology |   Tonia Wilson,    |                   |
|    | Visit     |            | [facility]  [address]   |                   |
|        |           |            | St  [place]  |                   |
|        |           |            | [address]  [phone]  |                   |
|        |           |            |  [phone] (Fax)  |                   |
+--------+-----------+------------+----------------------+-------------------+
| / | Hospital  | Radiology  |   Popeye Townsend, |                   |
|    | Encounter |            |  MD  [address] |                   |
|        |           |            |  St.  [place],   |                   |
|        |           |            | [place] [address]             |                   |
|        |           |            | [phone]         |                   |
|        |           |            | [phone] (Fax)   |                   |
+--------+-----------+------------+----------------------+-------------------+
| / | Surgery   | Radiology  |   Popeye Townsend, | CV EP PPM SYSTEM  |
|    |           |            |  MD  [address] | IMPLANT           |
|        |           |            |  St.  [place],   |                   |
|        |           |            | [place] [address]             |                   |
|        |           |            | [phone]         |                   |
|        |           |            | [phone] (Fax)   |                   |
+--------+-----------+------------+----------------------+-------------------+
| 02/10/ | Clinical  | Cardiology |                      |                   |
|    | Support   |            |                      |                   |
+--------+-----------+------------+----------------------+-------------------+
| / | Office    | Cardiology |   Tonia Wilson,    |                   |
|    | Visit     |            | [facility]  [address]   |                   |
|        |           |            | [place]  |                   |
|        |           |            | [address]  [phone]  |                   |
|        |           |            |  [phone] (Fax)  |                   |
+--------+-----------+------------+----------------------+-------------------+
| / | Off-Site  | Nephrology |   Marzena Moser  |                   |
|    | Visit     |            | DO ETIENNE  [address] [place] [address]      |                   |
|        |           |            | [address]      |                   |
|        |           |            | [place]      |                   |
|        |           |            | [address]  [phone]  |                   |
|        |           |            |  [phone] (Fax)  |                   |
+--------+-----------+------------+----------------------+-------------------+
 documented as of this encounter
 
 Visit Diagnoses
 Not on filedocumented in this encounter

## 2020-01-08 NOTE — XMS
Encounter Summary
  Created on: 2020
 
 Viviana Ricci
 External Reference #: [id number]
 : 47
 Sex: Female
 
 Demographics
 
 
+-----------------------+----------------------+
| Address               | [address]      |
|                       | [place]  [address] |
+-----------------------+----------------------+
| Home Phone            | [phone]      |
+-----------------------+----------------------+
| Preferred Language    | Unknown              |
+-----------------------+----------------------+
| Marital Status        | Single               |
+-----------------------+----------------------+
| Temple Affiliation | 1009                 |
+-----------------------+----------------------+
| Race                  | Unknown              |
+-----------------------+----------------------+
| Ethnic Group          | Unknown              |
+-----------------------+----------------------+
 
 
 Author
 
 
+--------------+--------------------------------------------+
| Author       | [facility]  |
|              | and [facility]                                |
+--------------+--------------------------------------------+
| Organization | [facility]  |
|              | and [facility]                                |
+--------------+--------------------------------------------+
| Address      | Unknown                                    |
+--------------+--------------------------------------------+
| Phone        | Unavailable                                |
+--------------+--------------------------------------------+
 
 
 
 Support
 
 
+----------------+--------------+---------------------+-----------------+
| Name           | Relationship | Address             | Phone           |
+----------------+--------------+---------------------+-----------------+
| Ada/Ed Radhames | ECON         | [address]              | [phone] |
|                |              | [place]  |                 |
|                |              |  [address]              |                 |
+----------------+--------------+---------------------+-----------------+
 
 
 
 
 Care Team Providers
 
 
+-----------------------+------+-------------+
| Care Team Member Name | Role | Phone       |
+-----------------------+------+-------------+
 PCP  | Unavailable |
+-----------------------+------+-------------+
 
 
 
 Encounter Details
 
 
+--------+-----------+----------------------+----------------------+-------------+
| Date   | Type      | Department           | Care Team            | Description |
+--------+-----------+----------------------+----------------------+-------------+
| / | Hospital  |   [facility] |   Marzena Moser  |             |
|  - | Encounter |  MED CTR ICU  [address]  | M, DO  [address]      |             |
|        |           | [place], | [address]      |             |
| / |           |  [place] [address]       | [place]      |             |
|    |           | [phone]         | [address]  [phone]  |             |
|        |           |                      |  [phone] (Fax)  |             |
+--------+-----------+----------------------+----------------------+-------------+
 
 
 
 Social History
 
 
+----------------+-------+-----------+--------+------+
| Tobacco Use    | Types | Packs/Day | Years  | Date |
|                |       |           | Used   |      |
+----------------+-------+-----------+--------+------+
| Never Assessed |       |           |        |      |
+----------------+-------+-----------+--------+------+
 
 
 
+------------------+---------------+
| Sex Assigned at  | Date Recorded |
| Birth            |               |
+------------------+---------------+
| Not on file      |               |
+------------------+---------------+
 
 
 
+----------------+-------------+-------------+
| Job Start Date | Occupation  | Industry    |
+----------------+-------------+-------------+
| Not on file    | Not on file | Not on file |
+----------------+-------------+-------------+
 
 
 
+----------------+--------------+------------+
| Travel History | Travel Start | Travel End |
+----------------+--------------+------------+
 
 
 
 
+-------------------------------------+
| No recent travel history available. |
+-------------------------------------+
 documented as of this encounter
 
 Plan of Treatment
 
 
+--------+-----------+------------+----------------------+-------------------+
| Date   | Type      | Specialty  | Care Team            | Description       |
+--------+-----------+------------+----------------------+-------------------+
| / | Office    | Cardiology |   Tonia Wilson,    |                   |
|    | Visit     |            | ARNJESSICA  [address]   |                   |
|        |           |            | St  [facility] [place]  |                   |
|        |           |            | [address]  [phone]  |                   |
|        |           |            |  [phone] (Fax)  |                   |
+--------+-----------+------------+----------------------+-------------------+
| / | Hospital  | Radiology  |   Popeye Townsend, |                   |
|    | Encounter |            |  MD  [address] |                   |
|        |           |            |  [place],   |                   |
|        |           |            | [place] [address]             |                   |
|        |           |            | [phone]         |                   |
|        |           |            | [phone] (Fax)   |                   |
+--------+-----------+------------+----------------------+-------------------+
| / | Surgery   | Radiology  |   Popeye Townsend, | CV EP PPM SYSTEM  |
|    |           |            |  MD  [address] | IMPLANT           |
|        |           |            |  St.  [place],   |                   |
|        |           |            | [place] [address]             |                   |
|        |           |            | [phone]         |                   |
|        |           |            | [phone] (Fax)   |                   |
+--------+-----------+------------+----------------------+-------------------+
| 02/10/ | Clinical  | Cardiology |                      |                   |
|    | Support   |            |                      |                   |
+--------+-----------+------------+----------------------+-------------------+
| / | Office    | Cardiology |   Tonia Wilson,    |                   |
|    | Visit     |            | ARNP  [address]   |                   |
|        |           |            | [place]  |                   |
|        |           |            | [address]  [phone]  |                   |
|        |           |            |  [phone] (Fax)  |                   |
+--------+-----------+------------+----------------------+-------------------+
| / | Off-Site  | Nephrology |   Marzena Moser  |                   |
|    | Visit     |            | DO ETIENNE  [address] [place] [address]      |                   |
|        |           |            | [address]      |                   |
|        |           |            | [place]      |                   |
|        |           |            | [address] [phone]  |                   |
|        |           |            |  [phone] (Fax)  |                   |
+--------+-----------+------------+----------------------+-------------------+
 documented as of this encounter
 
 Visit Diagnoses
 Not on filedocumented in this encounter

## 2020-01-08 NOTE — XMS
Encounter Summary
  Created on: 2020
 
 Viviana Ricci
 External Reference #: [id number]
 : 47
 Sex: Female
 
 Demographics
 
 
+-----------------------+----------------------+
| Address               | [address]      |
|                       | [place]  [address] |
+-----------------------+----------------------+
| Home Phone            | [phone]      |
+-----------------------+----------------------+
| Preferred Language    | Unknown              |
+-----------------------+----------------------+
| Marital Status        | Single               |
+-----------------------+----------------------+
| Buddhism Affiliation | 1009                 |
+-----------------------+----------------------+
| Race                  | Unknown              |
+-----------------------+----------------------+
| Ethnic Group          | Unknown              |
+-----------------------+----------------------+
 
 
 Author
 
 
+--------------+--------------------------------------------+
| Author       | [facility]  |
|              | and [place]                                |
+--------------+--------------------------------------------+
| Organization | [facility]  |
|              | and [facility]                                |
+--------------+--------------------------------------------+
| Address      | Unknown                                    |
+--------------+--------------------------------------------+
| Phone        | Unavailable                                |
+--------------+--------------------------------------------+
 
 
 
 Support
 
 
+----------------+--------------+---------------------+-----------------+
| Name           | Relationship | Address             | Phone           |
+----------------+--------------+---------------------+-----------------+
| Ada/Ed Radhames | ECON         | [address]              | [phone] |
|                |              | [place]  |                 |
|                |              |  [address]              |                 |
+----------------+--------------+---------------------+-----------------+
 
 
 
 
 Care Team Providers
 
 
+------------------------+------+-----------------+
| Care Team Member Name  | Role | Phone           |
+------------------------+------+-----------------+
| Marzena Moser DO | PCP  | [phone] |
+------------------------+------+-----------------+
 
 
 
 Encounter Details
 
 
+--------+----------+---------------------+----------------------+-------------+
| Date   | Type     | Department          | Care Team            | Description |
+--------+----------+---------------------+----------------------+-------------+
| / | Abstract |   [facility] [place]         |   Marzena Moser  |             |
|    |          | NEPHROLOGY  [address]   | DO [place]TIENNE  [address]      |             |
|        |          | [address]   | [address]      |             |
|        |          | [place], [place]     | [place], [place]      |             |
|        |          | [address]          | [address]  [phone]  |             |
|        |          | [phone]        |  [phone] (Fax)  |             |
+--------+----------+---------------------+----------------------+-------------+
 
 
 
 Social History
 
 
+--------------+-------+-----------+--------+------+
| Tobacco Use  | Types | Packs/Day | Years  | Date |
|              |       |           | Used   |      |
+--------------+-------+-----------+--------+------+
| Never Smoker |       |           |        |      |
+--------------+-------+-----------+--------+------+
 
 
 
+---------------------+---+---+---+
| Smokeless Tobacco:  |   |   |   |
| Never Used          |   |   |   |
+---------------------+---+---+---+
 
 
 
+---------------------------------------------------------------+
| Comments: some second hand smoke exposure, but fairly minimal |
+---------------------------------------------------------------+
 
 
 
+-------------+-------------+---------+----------+
| Alcohol Use | Drinks/Week | oz/Week | Comments |
+-------------+-------------+---------+----------+
| No          |             |         |          |
+-------------+-------------+---------+----------+
 
 
 
 
+------------------+---------------+
| Sex Assigned at  | Date Recorded |
| Birth            |               |
+------------------+---------------+
| Not on file      |               |
+------------------+---------------+
 
 
 
+----------------+-------------+-------------+
| Job Start Date | Occupation  | Industry    |
+----------------+-------------+-------------+
| Not on file    | Not on file | Not on file |
+----------------+-------------+-------------+
 
 
 
+----------------+--------------+------------+
| Travel History | Travel Start | Travel End |
+----------------+--------------+------------+
 
 
 
+-------------------------------------+
| No recent travel history available. |
+-------------------------------------+
 documented as of this encounter
 
 Plan of Treatment
 
 
+--------+-----------+------------+----------------------+-------------------+
| Date   | Type      | Specialty  | Care Team            | Description       |
+--------+-----------+------------+----------------------+-------------------+
| / | Office    | Cardiology |   Tonia Wilson,    |                   |
|    | Visit     |            | ARNP  [address]   |                   |
|        |           |            | St  [place] [facility] [place]  |                   |
|        |           |            | [address]  [phone]  |                   |
|        |           |            |  [phone] (Fax)  |                   |
+--------+-----------+------------+----------------------+-------------------+
| / | Hospital  | Radiology  |   Popeye Townsend, |                   |
|    | Encounter |            |  MD  [address] |                   |
|        |           |            |  St.  [place],   |                   |
|        |           |            | [place] [address]             |                   |
|        |           |            | [phone]         |                   |
|        |           |            | [phone] (Fax)   |                   |
+--------+-----------+------------+----------------------+-------------------+
| / | Surgery   | Radiology  |   Popeye Townsend, | CV EP PPM SYSTEM  |
|    |           |            |  MD  [address] | IMPLANT           |
|        |           |            |  St.  [place],   |                   |
|        |           |            | [place] [address]             |                   |
|        |           |            | [phone]         |                   |
|        |           |            | [phone] (Fax)   |                   |
+--------+-----------+------------+----------------------+-------------------+
| 02/10/ | Clinical  | Cardiology |                      |                   |
2020   | Support   |            |                      |                   |
+--------+-----------+------------+----------------------+-------------------+
| / | Office    | Cardiology |   Tonia Wilson,    |                   |
|    | Visit     |            | [facility]  [address]   |                   |
 
|        |           |            |   [place]  |                   |
|        |           |            | [address]  [phone]  |                   |
|        |           |            |  [phone] (Fax)  |                   |
+--------+-----------+------------+----------------------+-------------------+
| / | Off-Site  | Nephrology |   Marzena Moser  |                   |
2020   | Visit     |            | DO ETIENNE  [address]      |                   |
|        |           |            | [address]      |                   |
|        |           |            | [place]      |                   |
|        |           |            | [address]  [phone]  |                   |
|        |           |            |  [phone] (Fax)  |                   |
+--------+-----------+------------+----------------------+-------------------+
 documented as of this encounter
 
 Procedures
 
 
+---------------------+--------+------------+----------------------+----------------------+
| Procedure Name      | Priori | Date/Time  | Associated Diagnosis | Comments             |
|                     | ty     |            |                      |                      |
+---------------------+--------+------------+----------------------+----------------------+
| EXTERNAL LAB:       | Routin | 2018 |                      |   Results for this   |
| URINALYSIS          | e      |            |                      | procedure are in the |
|                     |        |            |                      |  results section.    |
+---------------------+--------+------------+----------------------+----------------------+
| EXTERNAL LAB:       | Routin | 2018 |                      |   Results for this   |
| PROTEIN/CREATININE  | e      |            |                      | procedure are in the |
| RATIO               |        |            |                      |  results section.    |
+---------------------+--------+------------+----------------------+----------------------+
 documented in this encounter
 
 Results
 External Lab: Urinalysis (2018)
 
+-------------+----------+-----------+------------+--------------+
| Component   | Value    | Ref Range | Performed  | Pathologist  |
|             |          |           | At         | Signature    |
+-------------+----------+-----------+------------+--------------+
| UA Blood,   | negative |           | EXTERNAL   |              |
| External    |          |           | LAB        |              |
+-------------+----------+-----------+------------+--------------+
| UA Glucose, | normal   |           | EXTERNAL   |              |
|  External   |          |           | LAB        |              |
+-------------+----------+-----------+------------+--------------+
| UA Ketones, | negative |           | EXTERNAL   |              |
|  External   |          |           | LAB        |              |
+-------------+----------+-----------+------------+--------------+
| UA Ph,      | 5        |           | EXTERNAL   |              |
| External    |          |           | LAB        |              |
+-------------+----------+-----------+------------+--------------+
| UA          | normal   |           | EXTERNAL   |              |
| Proteins,   |          |           | LAB        |              |
| External    |          |           |            |              |
+-------------+----------+-----------+------------+--------------+
| UA RBC,     | 2        |           | EXTERNAL   |              |
| External    |          |           | LAB        |              |
+-------------+----------+-----------+------------+--------------+
| UA Specific | 1.012    |           | EXTERNAL   |              |
|  Gravity,   |          |           | LAB        |              |
| External    |          |           |            |              |
+-------------+----------+-----------+------------+--------------+
 
| UA          | trace    |           | EXTERNAL   |              |
| Leukocyte   |          |           | LAB        |              |
| Esterase,   |          |           |            |              |
| External    |          |           |            |              |
+-------------+----------+-----------+------------+--------------+
 
 
 
+--------------------------+
| Resulting Agency Comment |
+--------------------------+
|   [facility]              |
+--------------------------+
 
 
 
+----------------+---------+--------------------+--------------+
| Performing     | Address | City/State/Zipcode | Phone Number |
| Organization   |         |                    |              |
+----------------+---------+--------------------+--------------+
|   EXTERNAL LAB |         |                    |              |
+----------------+---------+--------------------+--------------+
 External Lab: Protein/Creatinine Ratio (2018)
 
+-------------+---------+-----------+------------+--------------+
| Component   | Value   | Ref Range | Performed  | Pathologist  |
|             |         |           | At         | Signature    |
+-------------+---------+-----------+------------+--------------+
| Protein/Cre | 1.3 (A) | 0.0 - 0.2 |            |              |
| atinine     |         |           |            |              |
| Ratio,      |         |           |            |              |
| External    |         |           |            |              |
+-------------+---------+-----------+------------+--------------+
 
 
 
+----------+
| Specimen |
+----------+
|          |
+----------+
 documented in this encounter
 
 Visit Diagnoses
 Not on filedocumented in this encounter

## 2020-01-08 NOTE — XMS
Encounter Summary
  Created on: 2020
 
 Viviana Ricci
 External Reference #: [id number]
 : 47
 Sex: Female
 
 Demographics
 
 
+-----------------------+----------------------+
| Address               | [address]      |
|                       | [place]  [address] |
+-----------------------+----------------------+
| Home Phone            | [phone]      |
+-----------------------+----------------------+
| Preferred Language    | Unknown              |
+-----------------------+----------------------+
| Marital Status        | Single               |
+-----------------------+----------------------+
| Yazdanism Affiliation | [id number]                 |
+-----------------------+----------------------+
| Race                  | Unknown              |
+-----------------------+----------------------+
| Ethnic Group          | Unknown              |
+-----------------------+----------------------+
 
 
 Author
 
 
+--------------+--------------------------------------------+
| Author       | [facility] [place]  |
|              | and [place]                                |
+--------------+--------------------------------------------+
| Organization | [facility] [place]  |
|              | and [place]                                |
+--------------+--------------------------------------------+
| Address      | Unknown                                    |
+--------------+--------------------------------------------+
| Phone        | Unavailable                                |
+--------------+--------------------------------------------+
 
 
 
 Support
 
 
+----------------+--------------+---------------------+-----------------+
| Name           | Relationship | Address             | Phone           |
+----------------+--------------+---------------------+-----------------+
| Ada/Ed Radhames | ECON         | [place]              | [phone] |
|                |              | [place]  |                 |
|                |              |  [address]              |                 |
+----------------+--------------+---------------------+-----------------+
 
 
 
 
 Care Team Providers
 
 
+-----------------------+------+-------------+
| Care Team Member Name | Role | Phone       |
+-----------------------+------+-------------+
 PCP  | Unavailable |
+-----------------------+------+-------------+
 
 
 
 Reason for Visit
 
 
+-------------------+----------+
| Reason            | Comments |
+-------------------+----------+
| Medication Refill |          |
+-------------------+----------+
 
 
 
 Encounter Details
 
 
+--------+--------+---------------------+----------------------+-------------------+
| Date   | Type   | Department          | Care Team            | Description       |
+--------+--------+---------------------+----------------------+-------------------+
| / | Refill |   [facility]         |   Marzena Moser  | Medication Refill |
|    |        | NEPHROLOGY  [address]   | M, DO  [address]      |                   |
|        |        | [address]   | [address]      |                   |
|        |        | [place], [place]     | [place], [place]      |                   |
|        |        | [address]          | [address]  [phone]  |                   |
|        |        | [phone]        |  [phone] (Fax)  |                   |
+--------+--------+---------------------+----------------------+-------------------+
 
 
 
 Social History
 
 
+--------------+-------+-----------+--------+------+
| Tobacco Use  | Types | Packs/Day | Years  | Date |
|              |       |           | Used   |      |
+--------------+-------+-----------+--------+------+
| Never Smoker |       |           |        |      |
+--------------+-------+-----------+--------+------+
 
 
 
+---------------------+---+---+---+
| Smokeless Tobacco:  |   |   |   |
| Never Used          |   |   |   |
+---------------------+---+---+---+
 
 
 
+---------------------------------------------------------------+
| Comments: some second hand smoke exposure, but fairly minimal |
 
+---------------------------------------------------------------+
 
 
 
+-------------+-------------+---------+----------+
| Alcohol Use | Drinks/Week | oz/Week | Comments |
+-------------+-------------+---------+----------+
| No          |             |         |          |
+-------------+-------------+---------+----------+
 
 
 
+------------------+---------------+
| Sex Assigned at  | Date Recorded |
| Birth            |               |
+------------------+---------------+
| Not on file      |               |
+------------------+---------------+
 
 
 
+----------------+-------------+-------------+
| Job Start Date | Occupation  | Industry    |
+----------------+-------------+-------------+
| Not on file    | Not on file | Not on file |
+----------------+-------------+-------------+
 
 
 
+----------------+--------------+------------+
| Travel History | Travel Start | Travel End |
+----------------+--------------+------------+
 
 
 
+-------------------------------------+
| No recent travel history available. |
+-------------------------------------+
 documented as of this encounter
 
 Plan of Treatment
 
 
+--------+-----------+------------+----------------------+-------------------+
| Date   | Type      | Specialty  | Care Team            | Description       |
+--------+-----------+------------+----------------------+-------------------+
| / | Office    | Cardiology |   HellalfredoTonia,    |                   |
|    | Visit     |            | ARNP  [address]   |                   |
|        |           |            | St  [place], [place]  |                   |
|        |           |            | [address]  [phone]  |                   |
|        |           |            |  [phone] (Fax)  |                   |
+--------+-----------+------------+----------------------+-------------------+
| / | Hospital  | Radiology  |   Popeye Townsend, |                   |
|    | Encounter |            |  MD  [address] |                   |
|        |           |            |  St.  [place],   |                   |
|        |           |            | [place] [address]             |                   |
|        |           |            | [phone]         |                   |
|        |           |            | [phone] (Fax)   |                   |
+--------+-----------+------------+----------------------+-------------------+
| / | Surgery   | Radiology  |   Popeey Townsend, | CV EP PPM SYSTEM  |
 
|    |           |            |  MD  [address] | IMPLANT           |
|        |           |            |  St.  [place],   |                   |
|        |           |            | [place] [address]             |                   |
|        |           |            | [phone]         |                   |
|        |           |            | [phone] (Fax)   |                   |
+--------+-----------+------------+----------------------+-------------------+
| 02/10/ | Clinical  | Cardiology |                      |                   |
|    | Support   |            |                      |                   |
+--------+-----------+------------+----------------------+-------------------+
| / | Office    | Cardiology |   Tonia Wilson,    |                   |
|    | Visit     |            | ARNP  [address]   |                   |
|        |           |            | [place]  |                   |
|        |           |            | [address]  [phone]  |                   |
|        |           |            |  [phone] (Fax)  |                   |
+--------+-----------+------------+----------------------+-------------------+
| / | Off-Site  | Nephrology |   Marzena Moser  |                   |
|    | Visit     |            | DO ETIENNE  [address] [place] [address]      |                   |
|        |           |            | [address]      |                   |
|        |           |            | [place]      |                   |
|        |           |            | [address]  [phone]  |                   |
|        |           |            |  [phone] (Fax)  |                   |
+--------+-----------+------------+----------------------+-------------------+
 documented as of this encounter
 
 Visit Diagnoses
 Not on filedocumented in this encounter

## 2020-01-08 NOTE — XMS
Encounter Summary
  Created on: 2020
 
 Viviana Ricci
 External Reference #: [id number]
 : 47
 Sex: Female
 
 Demographics
 
 
+-----------------------+----------------------+
| Address               | [address]      |
|                       | [place]  [address] |
+-----------------------+----------------------+
| Home Phone            | [phone]      |
+-----------------------+----------------------+
| Preferred Language    | Unknown              |
+-----------------------+----------------------+
| Marital Status        | Single               |
+-----------------------+----------------------+
| Zoroastrian Affiliation | [id number]                 |
+-----------------------+----------------------+
| Race                  | Unknown              |
+-----------------------+----------------------+
| Ethnic Group          | Unknown              |
+-----------------------+----------------------+
 
 
 Author
 
 
+--------------+--------------------------------------------+
| Author       | [facility] [place]  |
|              | and [place]                                |
+--------------+--------------------------------------------+
| Organization | [facility] [place]  |
|              | and [place]                                |
+--------------+--------------------------------------------+
| Address      | Unknown                                    |
+--------------+--------------------------------------------+
| Phone        | Unavailable                                |
+--------------+--------------------------------------------+
 
 
 
 Support
 
 
+----------------+--------------+---------------------+-----------------+
| Name           | Relationship | Address             | Phone           |
+----------------+--------------+---------------------+-----------------+
| Ada/Ed Radhames | ECON         | [place]              | [phone] |
|                |              | [place]  |                 |
|                |              |  [address]              |                 |
+----------------+--------------+---------------------+-----------------+
 
 
 
 
 Care Team Providers
 
 
+-----------------------+------+-------------+
| Care Team Member Name | Role | Phone       |
+-----------------------+------+-------------+
 PCP  | Unavailable |
+-----------------------+------+-------------+
 
 
 
 Reason for Visit
 
 
+-------------------+----------+
| Reason            | Comments |
+-------------------+----------+
| Medication Refill |          |
+-------------------+----------+
 
 
 
 Encounter Details
 
 
+--------+--------+---------------------+----------------------+-------------------+
| Date   | Type   | Department          | Care Team            | Description       |
+--------+--------+---------------------+----------------------+-------------------+
| / | Refill |   [place]         |   Marzena Moser  | Medication Refill |
|    |        | NEPHROLOGY  [address]   | M, DO  [address]      |                   |
|        |        | [address]   | [address]      |                   |
|        |        | [place], [place]     | [place], [place]      |                   |
|        |        | [address]          | [address]  [phone]  |                   |
|        |        | [phone]        |  [phone] (Fax)  |                   |
+--------+--------+---------------------+----------------------+-------------------+
 
 
 
 Social History
 
 
+--------------+-------+-----------+--------+------+
| Tobacco Use  | Types | Packs/Day | Years  | Date |
|              |       |           | Used   |      |
+--------------+-------+-----------+--------+------+
| Never Smoker |       |           |        |      |
+--------------+-------+-----------+--------+------+
 
 
 
+---------------------+---+---+---+
| Smokeless Tobacco:  |   |   |   |
| Never Used          |   |   |   |
+---------------------+---+---+---+
 
 
 
+---------------------------------------------------------------+
| Comments: some second hand smoke exposure, but fairly minimal |
 
+---------------------------------------------------------------+
 
 
 
+-------------+-------------+---------+----------+
| Alcohol Use | Drinks/Week | oz/Week | Comments |
+-------------+-------------+---------+----------+
| No          |             |         |          |
+-------------+-------------+---------+----------+
 
 
 
+------------------+---------------+
| Sex Assigned at  | Date Recorded |
| Birth            |               |
+------------------+---------------+
| Not on file      |               |
+------------------+---------------+
 
 
 
+----------------+-------------+-------------+
| Job Start Date | Occupation  | Industry    |
+----------------+-------------+-------------+
| Not on file    | Not on file | Not on file |
+----------------+-------------+-------------+
 
 
 
+----------------+--------------+------------+
| Travel History | Travel Start | Travel End |
+----------------+--------------+------------+
 
 
 
+-------------------------------------+
| No recent travel history available. |
+-------------------------------------+
 documented as of this encounter
 
 Plan of Treatment
 
 
+--------+-----------+------------+----------------------+-------------------+
| Date   | Type      | Specialty  | Care Team            | Description       |
+--------+-----------+------------+----------------------+-------------------+
| / | Office    | Cardiology |   HellalfredoTonia,    |                   |
|    | Visit     |            | ARNP  [address]   |                   |
|        |           |            | St  [place], [place]  |                   |
|        |           |            | [address]  [phone]  |                   |
|        |           |            |  [phone] (Fax)  |                   |
+--------+-----------+------------+----------------------+-------------------+
| / | Hospital  | Radiology  |   Popeye Townsend, |                   |
|    | Encounter |            |  MD  [address] |                   |
|        |           |            |  St.  [place],   |                   |
|        |           |            | [place] [address]             |                   |
|        |           |            | [phone]         |                   |
|        |           |            | [phone] (Fax)   |                   |
+--------+-----------+------------+----------------------+-------------------+
| / | Surgery   | Radiology  |   Popeye Townsend, | CV EP PPM SYSTEM  |
 
|    |           |            |  MD  [address] | IMPLANT           |
|        |           |            |  St.  [place],   |                   |
|        |           |            | [place] [address]             |                   |
|        |           |            | [phone]         |                   |
|        |           |            | [phone] (Fax)   |                   |
+--------+-----------+------------+----------------------+-------------------+
| 02/10/ | Clinical  | Cardiology |                      |                   |
|    | Support   |            |                      |                   |
+--------+-----------+------------+----------------------+-------------------+
| / | Office    | Cardiology |   Tonia Wilson,    |                   |
|    | Visit     |            | ARNP  [address]   |                   |
|        |           |            | [place]  |                   |
|        |           |            | [address]  [phone]  |                   |
|        |           |            |  [phone] (Fax)  |                   |
+--------+-----------+------------+----------------------+-------------------+
| / | Off-Site  | Nephrology |   Marzena Moser  |                   |
|    | Visit     |            | DO ETIENNE  [address] [place] [address]      |                   |
|        |           |            | [address]      |                   |
|        |           |            | [place]      |                   |
|        |           |            | [address]  [phone]  |                   |
|        |           |            |  [phone] (Fax)  |                   |
+--------+-----------+------------+----------------------+-------------------+
 documented as of this encounter
 
 Visit Diagnoses
 Not on filedocumented in this encounter

## 2020-01-08 NOTE — XMS
Encounter Summary
  Created on: 2020
 
 Viviana Ricci
 External Reference #: [id number]
 : 47
 Sex: Female
 
 Demographics
 
 
+-----------------------+----------------------+
| Address               | [address]      |
|                       | [place]  [address] |
+-----------------------+----------------------+
| Home Phone            | [phone]      |
+-----------------------+----------------------+
| Preferred Language    | Unknown              |
+-----------------------+----------------------+
| Marital Status        | Single               |
+-----------------------+----------------------+
| Taoist Affiliation | [id number]                 |
+-----------------------+----------------------+
| Race                  | Unknown              |
+-----------------------+----------------------+
| Ethnic Group          | Unknown              |
+-----------------------+----------------------+
 
 
 Author
 
 
+--------------+--------------------------------------------+
| Author       | [facility]  |
|              | and [facility]                                |
+--------------+--------------------------------------------+
| Organization | [facility]  |
|              | and [facility]                                |
+--------------+--------------------------------------------+
| Address      | Unknown                                    |
+--------------+--------------------------------------------+
| Phone        | Unavailable                                |
+--------------+--------------------------------------------+
 
 
 
 Support
 
 
+----------------+--------------+---------------------+-----------------+
| Name           | Relationship | Address             | Phone           |
+----------------+--------------+---------------------+-----------------+
| Ada/Ed Radhames | ECON         | [address]              | [phone] |
|                |              | [place]  |                 |
|                |              |  [id number]              |                 |
+----------------+--------------+---------------------+-----------------+
 
 
 
 
 Care Team Providers
 
 
+-----------------------+------+-------------+
| Care Team Member Name | Role | Phone       |
+-----------------------+------+-------------+
 PCP  | Unavailable |
+-----------------------+------+-------------+
 
 
 
 Reason for Visit
 
 
+--------+----------+
| Reason | Comments |
+--------+----------+
| Cough  |          |
+--------+----------+
 
 
 
 Encounter Details
 
 
+--------+---------+----------------------+----------------+----------------------+
| Date   | Type    | Department           | Care Team      | Description          |
+--------+---------+----------------------+----------------+----------------------+
| / | Office  |   [facility] [place]          |   [place],  | Cough (Primary Dx);  |
|    | Visit   | PULMONARY  [address]     | Nena GUSMAN MD  | Pulmonary            |
|        |         | [place]  [place], |                | hypertension (HCC);  |
|        |         |  [place] [address]       |                | Nocturnal hypoxemia; |
|        |         | [phone]         |                |  ONOFRE on CPAP;        |
|        |         |                      |                | Dyspnea              |
+--------+---------+----------------------+----------------+----------------------+
 
 
 
 Social History
 
 
+--------------+-------+-----------+--------+------+
| Tobacco Use  | Types | Packs/Day | Years  | Date |
|              |       |           | Used   |      |
+--------------+-------+-----------+--------+------+
| Never Smoker |       |           |        |      |
+--------------+-------+-----------+--------+------+
 
 
 
+---------------------+---+---+---+
| Smokeless Tobacco:  |   |   |   |
| Never Used          |   |   |   |
+---------------------+---+---+---+
 
 
 
+---------------------------------------------------------------+
| Comments: some second hand smoke exposure, but fairly minimal |
 
+---------------------------------------------------------------+
 
 
 
+-------------+-------------+---------+----------+
| Alcohol Use | Drinks/Week | oz/Week | Comments |
+-------------+-------------+---------+----------+
| No          |             |         |          |
+-------------+-------------+---------+----------+
 
 
 
+------------------+---------------+
| Sex Assigned at  | Date Recorded |
| Birth            |               |
+------------------+---------------+
| Not on file      |               |
+------------------+---------------+
 
 
 
+----------------+-------------+-------------+
| Job Start Date | Occupation  | Industry    |
+----------------+-------------+-------------+
| Not on file    | Not on file | Not on file |
+----------------+-------------+-------------+
 
 
 
+----------------+--------------+------------+
| Travel History | Travel Start | Travel End |
+----------------+--------------+------------+
 
 
 
+-------------------------------------+
| No recent travel history available. |
+-------------------------------------+
 documented as of this encounter
 
 Last Filed Vital Signs
 
 
+-------------------+-------------------+----------------------+----------+
| Vital Sign        | Reading           | Time Taken           | Comments |
+-------------------+-------------------+----------------------+----------+
| Blood Pressure    | 120/80            | 2013  2:12 PM  |          |
|                   |                   | PDT                  |          |
+-------------------+-------------------+----------------------+----------+
| Pulse             | 67                | 2013  2:12 PM  |          |
|                   |                   | PDT                  |          |
+-------------------+-------------------+----------------------+----------+
| Temperature       | -                 | -                    |          |
+-------------------+-------------------+----------------------+----------+
| Respiratory Rate  | -                 | -                    |          |
+-------------------+-------------------+----------------------+----------+
| Oxygen Saturation | 96%               | 2013  2:12 PM  |          |
|                   |                   | PDT                  |          |
+-------------------+-------------------+----------------------+----------+
| Inhaled Oxygen    | -                 | -                    |          |
 
| Concentration     |                   |                      |          |
+-------------------+-------------------+----------------------+----------+
| Weight            | 135.1 kg (297 lb  | 2013  2:12 PM  |          |
|                   | 14.4 oz)          | PDT                  |          |
+-------------------+-------------------+----------------------+----------+
| Height            | 165.1 cm (5' 5")  | 2013  2:12 PM  |          |
|                   |                   | PDT                  |          |
+-------------------+-------------------+----------------------+----------+
| Body Mass Index   | 49.57             | 2013  2:12 PM  |          |
|                   |                   | PDT                  |          |
+-------------------+-------------------+----------------------+----------+
 documented in this encounter
 
 Patient Instructions
 Patient Instructions Nena Boykin MD - 2013  3:05 PM PDTDo breathing test.
 
 I will get the CPAP download (hopefully). If the sleep apnea is well controlled, then I hakeem
l add in oxygen. If the sleep apnea is not well controlled, I will adjust the machine before
 starting oxygen as your saturation is really fairly borderline when discounting artifact. 
 
 I will see you back in a few weeks to review. 
 Electronically signed by Nena Boykin MD at 2013  3:07 PM PDT
 documented in this encounter
 
 Progress Notes
 Nena Boykin MD - 2013  2:41 PM PDTFormatting of this note might be differe
nt from the original.
 Pulmonary Follow Up
 
 HPI 
  
 Viviana Ricci is a 67 y.o. female patient of Marzena Moser here today for follow up of 
cough and shortness of breath. 
 
 She did get her overnight oximetry done and her echocardiogram. She took her CPAP to [employer] for a download, but we did not receive that. 
 
 She notes that the cough and wheezing has not yet returned. She has good days and bad days 
where she will fatigued, weak and short of breath. Some days she can walk further than other
s. 
 
 Her nocturnal oximetry did show that she does have some evidence of desaturations, which ar
e fairly minor. The full graphical data was not received, but it looks like the worst of the
 desaturations (down to 76%) were likely artifact, but she did likely spend some of the nigh
t at 86-87%. 
 
 Her echocardiogram was inconclusive, as it did not show evidence of pulmonary hypertension,
 but it could not be excluded because of body habitus issues. 
 
 Past Medical History
 Past Medical History 
 Diagnosis Date 
   Kidney replaced by transplant  
   Complication of transplanted kidney  
   Coronary artery disease  
   s/p CABG , cardiac cath in  
   Pulmonary hypertension  
   thought secondary to nocturnal hypoxemia 
   Obesity hypoventilation syndrome  
   ONOFRE on CPAP  
 
   Diabetes mellitus, type 2  
   Secondary hyperparathyroidism  
   Hypothyroidism  
   Hyperlipidemia  
   Chronic low back pain  
   Movement disorder  
   tremor of unclear etiology 
   DJD (degenerative joint disease)  
   s/p knee replacement 
   Humerus fracture 2003 
   right supracondylar distal humerus fracture 
   Carpal tunnel syndrome  
   Anemia in chronic renal disease  
   resolved after transplant 
   Infection with CMV (cytomegalovirus) 2008 
   complicating kidney transplant 
   FSGS (focal segmental glomerulosclerosis)  
   ESRD (end stage renal disease)  
  
  
 Past Surgical History
 Past Surgical History 
 Procedure Date 
   Cholecystectomy  
   Insert peritoneal catheter 1998 
   x 2 
   Kidney transplant 2000 
   Total knee arthroplasty 2004 
   Right 
   Hysterectomy 2003 
   Abdominal exploration surgery 2006 
   Parathyroidectomy 2007 
   Carpal tunnel release 2005 
   Coronary artery bypass graft 1998 
   5v 
  
 
 Social History: 
 History 
 
 Social History 
   Marital Status: Single 
   Spouse Name: N/A 
   Number of Children: N/A 
   Years of Education: N/A 
 
 Occupational History 
   .  Disabled 
 
 Social History Main Topics 
   Smoking status: Never Smoker  
   Smokeless tobacco: Never Used 
  Comment: some second hand smoke exposure, but fairly minimal 
   Alcohol Use: No 
   Drug Use: No 
   Sexually Active: None 
 
 Other Topics Concern 
   None 
 
 
 Social History Narrative 
  Lives in [place] alone. Has a dog at home. No other animal exposures. Had birds as a chi
ld. Grew up in [place], [place]. 
 
 Allergies:
 No Known Allergies  
 
 Medications:
 Outpatient Encounter Prescriptions as of 2013 
 Medication Sig Dispense Refill 
   Insulin Syringe-Needle U-100 (BD INSULIN SYRINGE ULTRAFINE) 31G X 5/16" 0.5 ML MISC Use
 before meals and as directed.  100 each  11 
   metoprolol tartrate (LOPRESSOR) 25 mg tablet Take 1 tablet by mouth 2 times daily.  60 
tablet  11 
   Prenatal Multivit-Min-Fe-FA (PRENATAL VITAMINS) 0.8 MG TABS Take 0.8 mg by mouth Daily.
  30 each  11 
   valsartan (DIOVAN) 160 mg tablet Take 1 tablet by mouth Daily.  30 tablet  11 
   albuterol (PROAIR HFA) 90 mcg/puff inhaler Inhale 2 puffs into the lungs every 6 hours 
as needed for Wheezing.  1 Inhaler  2 
   lisinopril (PRINIVIL,ZESTRIL) 30 MG tablet Take 1 tablet by mouth Daily.  90 tablet  3 
   predniSONE (DELTASONE) 5 mg tablet Take 1 tablet by mouth Daily.  90 tablet  3 
   glucose blood VI test strips (ONE TOUCH ULTRA TEST) strip Check blood sugar before each
 meal and as directed  100 each  12 
   furosemide (LASIX) 40 mg tablet Take two tablets by mouth daily.  60 tablet  5 
   rosuvastatin (CRESTOR) 40 MG tablet Take 40 mg by mouth nightly.       
   tacrolimus (PROGRAF) 1 mg capsule Take 1 mg by mouth 2 times daily.     
   tacrolimus (PROGRAF) 0.5 mg capsule Take 0.5 mg by mouth 2 times daily.     
   insulin glargine (LANTUS) 100 units/mL injection Inject 20 Units under the skin every m
orning.       
   cinacalcet (SENSIPAR) 30 mg tablet Take 1 tablet by mouth Daily.  30 tablet  12 
   fludrocortisone (FLORINEF) 0.1 mg tablet Take 1 tablet by mouth Every other day.  30 ta
blet  11 
   levothyroxine (LEVOTHROID) 50 mcg tablet Take 1 tablet by mouth Daily.  30 tablet  11 
   omeprazole (PRILOSEC) 20 mg capsule Take one capsule by mouth once daily on an empty st
omach     
   allopurinol (ZYLOPRIM) 100 mg tablet Take 100 mg by mouth Daily.     
   aspirin 81 MG EC tablet Take 81 mg by mouth Daily.     
   loperamide (ANTI-DIARRHEAL) 2 mg capsule Take 2 mg by mouth Daily as needed.     
   mycophenolate (CELLCEPT) 250 mg capsule Take 250 mg by mouth 3 times daily.     
   insulin lispro (HUMALOG) 100 units/mL injection Inject subcutaneously before meals acco
rding to sliding scale     
   Cholecalciferol (VITAMIN D3) 5000 UNITS CAPS Take 5,000 Units by mouth Once a week.    
 
   magnesium oxide (MAG-OX) 400 mg tablet Take 400 mg by mouth 2 times daily.     
   DISCONTD: niacin (NIASPAN) 500 mg CR tablet Not taking     
 
 Objective 
 
 /80 | Pulse 67 | Ht 1.651 m (5' 5") | Wt 135.127 kg (297 lb 14.4 oz) | BMI 49.57 kg/m
2 | SpO2 96%
 
 General Appearance:  Alert, cooperative, no distress, appears stated age, occasional tremor
s 
 Head:  Normocephalic, without obvious abnormality, atraumatic 
 Eyes:  PERRL, conjunctiva clear, no scleral icterus, EOM's intact 
 Ears:  Normal TM's, external auditory canals, normal acuity 
 Nose: Nares normal, septum midline, mucosa normal 
 Mouth: No oral lesions or exudate 
 Neck: Supple, symmetrical, no adenopathy 
 Lungs:   No accessory muscle use, breath sounds are clear to auscultation bilaterally, no w
 
heezes, crackles or rhonchi 
 Chest Wall:  No deformity 
 Heart:  Regular rate and rhythm, no murmur, rub or gallop 
 Abdomen:   Soft, non-tender, non-distended, obese 
 Extremities:  No cyanosis, clubbing, or edema 
 Pulses: Radial pulses 2+ and symmetric 
 Skin: Warm and dry 
 Lymph nodes: Cervical and supraclavicular nodes normal 
 
 Data:
 Overnight oximetry was done and reviewed. See results above.
 
 Echocardiogram was done and reviewed. See results above.
 
 CPAP download received after visit, and will be reviewed with patient at next visit. Her AH
I was well controlled, but her compliance is poor. She used it for greater than 4 hours only
 about 30% of the time. 
 
 Immunization History 
 Administered Date(s) Administered 
   INFLUENZA, PRESERVATIVE FREE IM 2012 
 
 Assessment /Plan 
 Ms. Ricci was seen today for follow up of cough, fatigue and dyspnea.
 I have put some thought in to this and I think there may be a couple of things going on her
e. First, the cough and wheezing may be a sign of volume overload that comes on intermittent
ly due to some renal insufficiency. Another possibility os an asthma variant. I am going to 
do a methacholine challenge test to exclude asthma. It will have to be done on her baseline 
prednisone dose, which she takes for her transplant. That being said, her symptoms occur on 
this as well, and at this point, this is her physiologic hormonal state. 
 The fatigue and baseline dyspnea is likely due to her pulmonary hypertension (some of which
 is volume overload related as we see she has an elevated wedge pressure on right heart cath
), and also due to obesity and undertreated sleep apnea and hypoxemia. Her CPAP usage has be
en low of late and needs to improve. I suspect that is part of the reason she feels poorly. 
We need to bleed oxygen in to her CPAP as her saturation is slightly low. 
 Then, if she is maximally tuned up medically, we need to get her in to physical therapy and
 moving again. 
 
 Diagnoses and associated orders for this visit:
 
 Cough
 Suspect fluid overload, we will exclude asthma. 
 - Pulmonary Fx Tests (Hospital Performed); methacholine challenge test
 
 Pulmonary hypertension
 Though echocardiogram was inconclusive, I am sure it is still present to some degree. 
 - Oxygen Therapy; 2LPM bleed in to CPAP
 
 Nocturnal hypoxemia
 Secondary to multiple things including pulmonary hypertension, volume overload, and obesity
 hypoventilation. 
 - Oxygen Therapy; start 2LPM bleed in
 
 Onofre on cpap
 Poor compliance of late, which likely makes her feel poorly. AHI is well controlled on her 
CPAP. 
 
 Dyspnea
 I suspect this is multifactorial as above. 
 
 
 20 minutes were spent with Ms. Ricci with greater than 50% spent in counseling and coordina
tion of care.
 
 She was advised to call if new pulmonary symptoms were to develop.
 
 Return to clinic in 2-3 weeks, or sooner with concerns. We will get the methacholine challe
nge test done, and start the oxygen bleed in before then. 
 
 CC: Marzena Moser
 
 Portions of this report were transcribed using voice recognition software.  Every effort wa
s made to ensure accuracy; however, inadvertent computerized transcription errors may be pre
sent.
 
 Electronically signed by Nena Boykin MD at 2013  5:19 PM PDTdocumented in t
his encounter
 
 Plan of Treatment
 
 
+--------+-----------+------------+----------------------+-------------------+
| Date   | Type      | Specialty  | Care Team            | Description       |
+--------+-----------+------------+----------------------+-------------------+
| / | Office    | Cardiology |   Tonia Wilson,    |                   |
|    | Visit     |            | ARNJESSICA  [address]   |                   |
|        |           |            | St  [place], [place]  |                   |
|        |           |            | [address]  [phone]  |                   |
|        |           |            |  [phone] (Fax)  |                   |
+--------+-----------+------------+----------------------+-------------------+
| / | Hospital  | Radiology  |   Popeye Townsend, |                   |
|    | Encounter |            |  MD  [address] |                   |
|        |           |            |  St.  [place],   |                   |
|        |           |            | [place] [address]             |                   |
|        |           |            | [phone]         |                   |
|        |           |            | [phone] (Fax)   |                   |
+--------+-----------+------------+----------------------+-------------------+
| / | Surgery   | Radiology  |   Popeye Townsend, | CV EP PPM SYSTEM  |
|    |           |            |  MD  [address] | IMPLANT           |
|        |           |            |  St.  [place],   |                   |
|        |           |            | [place] [address]             |                   |
|        |           |            | [phone]         |                   |
|        |           |            | [phone] (Fax)   |                   |
+--------+-----------+------------+----------------------+-------------------+
| 02/10/ | Clinical  | Cardiology |                      |                   |
|    | Support   |            |                      |                   |
+--------+-----------+------------+----------------------+-------------------+
| / | Office    | Cardiology |   Tonia Wilson,    |                   |
|    | Visit     |            | ARNP  [address]   |                   |
|        |           |            | [place]  |                   |
|        |           |            | [address]  [phone]  |                   |
|        |           |            |  [phone] (Fax)  |                   |
+--------+-----------+------------+----------------------+-------------------+
| / | Off-Site  | Nephrology |   Marzena Moser  |                   |
|    | Visit     |            | DO ETIENNE  [address] [place] [address]      |                   |
|        |           |            | [address]      |                   |
|        |           |            | [place]      |                   |
|        |           |            | [address]  [phone]  |                   |
|        |           |            |  [phone] (Fax)  |                   |
+--------+-----------+------------+----------------------+-------------------+
 
 
 
 
+----------------------+-------------+--------+----------------------+----------------------
+
| Name                 | Type        | Priori | Associated Diagnoses | Order Schedule       
|
|                      |             | ty     |                      |                      
|
+----------------------+-------------+--------+----------------------+----------------------
+
| Pulmonary Fx Tests   | Respiratory | ASAP   |   Cough              | 1 Occurrences        
|
| (Hospital Performed) |  Care       |        |                      | starting 2013  
|
|                      |             |        |                      | until 2014     
|
+----------------------+-------------+--------+----------------------+----------------------
+
| Oxygen Therapy       | Respiratory | Routin |   Pulmonary          | Expected:            
|
|                      |  Care       | e      | hypertension (HCC)   | 2013, Expires: 
|
|                      |             |        | Nocturnal hypoxia    |  2014          
|
|                      |             |        | ONOFRE on CPAP          |                      
|
+----------------------+-------------+--------+----------------------+----------------------
+
 documented as of this encounter
 
 Visit Diagnoses
 
 
+------------------------------------------------------------------------+
| Diagnosis                                                              |
+------------------------------------------------------------------------+
|   Cough - Primary                                                      |
+------------------------------------------------------------------------+
|   Pulmonary hypertension (HCC)  Other chronic pulmonary heart diseases |
+------------------------------------------------------------------------+
|   Nocturnal hypoxemia  Hypoxemia                                       |
+------------------------------------------------------------------------+
|   ONOFRE on CPAP  Obstructive sleep apnea (adult) (pediatric)             |
+------------------------------------------------------------------------+
|   Dyspnea  Other dyspnea and respiratory abnormality                   |
+------------------------------------------------------------------------+
 documented in this encounter

## 2020-01-08 NOTE — XMS
Encounter Summary
  Created on: 2020
 
 Viviana Ricci
 External Reference #: [id number]
 : 47
 Sex: Female
 
 Demographics
 
 
+-----------------------+----------------------+
| Address               | [address]      |
|                       | [place]  [address] |
+-----------------------+----------------------+
| Home Phone            | [phone]      |
+-----------------------+----------------------+
| Preferred Language    | Unknown              |
+-----------------------+----------------------+
| Marital Status        | Single               |
+-----------------------+----------------------+
| Sikhism Affiliation | [id number]                 |
+-----------------------+----------------------+
| Race                  | Unknown              |
+-----------------------+----------------------+
| Ethnic Group          | Unknown              |
+-----------------------+----------------------+
 
 
 Author
 
 
+--------------+--------------------------------------------+
| Author       | [facility]  |
|              | and [facility]                                |
+--------------+--------------------------------------------+
| Organization | [facility]  |
|              | and [place]                                |
+--------------+--------------------------------------------+
| Address      | Unknown                                    |
+--------------+--------------------------------------------+
| Phone        | Unavailable                                |
+--------------+--------------------------------------------+
 
 
 
 Support
 
 
+----------------+--------------+---------------------+-----------------+
| Name           | Relationship | Address             | Phone           |
+----------------+--------------+---------------------+-----------------+
| Ada/Ed Radhames | ECON         | [address]              | [phone] |
|                |              | [place]  |                 |
|                |              |  [address]              |                 |
+----------------+--------------+---------------------+-----------------+
 
 
 
 
 Care Team Providers
 
 
+-----------------------+------+-------------+
| Care Team Member Name | Role | Phone       |
+-----------------------+------+-------------+
 PCP  | Unavailable |
+-----------------------+------+-------------+
 
 
 
 Reason for Referral
 Diagnostic/Screening (Routine)
 
+--------+--------+-----------+--------------+--------------+---------------+
| Status | Reason | Specialty | Diagnoses /  | Referred By  | Referred To   |
|        |        |           | Procedures   | Contact      | Contact       |
+--------+--------+-----------+--------------+--------------+---------------+
| Closed |        | Radiology |   Diagnoses  |   [place], |   [facility] |
|        |        |           |  Other chest |  MD Popeye  |  Medicine     |
|        |        |           |  pain  CAD   |  [address]    | [address]  |
|        |        |           | (coronary    | [address]   |  [place], |
|        |        |           | artery       | [place], |  [place]           |
|        |        |           | disease)     |  [place] [address]    | [address]    |
|        |        |           | Pre-op       | Phone:       | Phone:        |
|        |        |           | evaluation   | [phone] | [phone]  |
|        |        |           | Procedures   |   Fax:       |  Fax:         |
|        |        |           | [facility] Nuclear   | [phone] | [phone]  |
|        |        |           | Stress Test  |              |               |
|        |        |           | (Vasodilator |              |               |
|        |        |           | )            |              |               |
+--------+--------+-----------+--------------+--------------+---------------+
 
 
 
 
 Reason for Visit
 
 
+----------------+-------------------------+
| Reason         | Comments                |
+----------------+-------------------------+
| Establish Care | Pulmonary Hypertension  |
+----------------+-------------------------+
 
 
 
 Encounter Details
 
 
+--------+---------+----------------------+----------------------+--------------------+
| Date   | Type    | Department           | Care Team            | Description        |
+--------+---------+----------------------+----------------------+--------------------+
| / | Office  |   [facility]          |   Popeye Townsend, | Other chest pain   |
|    | Visit   | CARDIOLOGY  [address]    |  MD  [address] | (Primary Dx); CAD  |
|        |         | [address]  [place], |  [address]  [place],   | (coronary artery   |
|        |         |  [place] [address]       | [place] [address]             | disease); Pre-op   |
|        |         | [phone]         | [phone]         | evaluation         |
|        |         |                      | [phone] (Fax)   |                    |
 
+--------+---------+----------------------+----------------------+--------------------+
 
 
 
 Social History
 
 
+--------------+-------+-----------+--------+------+
| Tobacco Use  | Types | Packs/Day | Years  | Date |
|              |       |           | Used   |      |
+--------------+-------+-----------+--------+------+
| Never Smoker |       |           |        |      |
+--------------+-------+-----------+--------+------+
 
 
 
+---------------------+---+---+---+
| Smokeless Tobacco:  |   |   |   |
| Never Used          |   |   |   |
+---------------------+---+---+---+
 
 
 
+---------------------------------------------------------------+
| Comments: some second hand smoke exposure, but fairly minimal |
+---------------------------------------------------------------+
 
 
 
+-------------+-------------+---------+----------+
| Alcohol Use | Drinks/Week | oz/Week | Comments |
+-------------+-------------+---------+----------+
| No          |             |         |          |
+-------------+-------------+---------+----------+
 
 
 
+------------------+---------------+
| Sex Assigned at  | Date Recorded |
| Birth            |               |
+------------------+---------------+
| Not on file      |               |
+------------------+---------------+
 
 
 
+----------------+-------------+-------------+
| Job Start Date | Occupation  | Industry    |
+----------------+-------------+-------------+
| Not on file    | Not on file | Not on file |
+----------------+-------------+-------------+
 
 
 
+----------------+--------------+------------+
| Travel History | Travel Start | Travel End |
+----------------+--------------+------------+
 
 
 
 
+-------------------------------------+
| No recent travel history available. |
+-------------------------------------+
 documented as of this encounter
 
 Last Filed Vital Signs
 
 
+-------------------+-------------------+----------------------+----------+
| Vital Sign        | Reading           | Time Taken           | Comments |
+-------------------+-------------------+----------------------+----------+
| Blood Pressure    | 100/49            | 2014  1:57 PM  |          |
|                   |                   | PDT                  |          |
+-------------------+-------------------+----------------------+----------+
| Pulse             | 82                | 2014  1:57 PM  | regular  |
|                   |                   | PDT                  |          |
+-------------------+-------------------+----------------------+----------+
| Temperature       | -                 | -                    |          |
+-------------------+-------------------+----------------------+----------+
| Respiratory Rate  | 20                | 2014  1:57 PM  |          |
|                   |                   | PDT                  |          |
+-------------------+-------------------+----------------------+----------+
| Oxygen Saturation | -                 | -                    |          |
+-------------------+-------------------+----------------------+----------+
| Inhaled Oxygen    | -                 | -                    |          |
| Concentration     |                   |                      |          |
+-------------------+-------------------+----------------------+----------+
| Weight            | 125.6 kg (277 lb) | 2014  1:57 PM  |          |
|                   |                   | PDT                  |          |
+-------------------+-------------------+----------------------+----------+
| Height            | 167.6 cm (5' 6")  | 2014  1:57 PM  |          |
|                   |                   | PDT                  |          |
+-------------------+-------------------+----------------------+----------+
| Body Mass Index   | 44.71             | 2014  1:57 PM  |          |
|                   |                   | PDT                  |          |
+-------------------+-------------------+----------------------+----------+
 documented in this encounter
 
 Progress Notes
 Popeye Townsend MD - 2014  2:00 PM PDTFormatting of this note might be different f
rom the original.
   
 
 PATIENT NAME:  Viviana Ricci  
 : 1945:         AGE: 68 y.o.
 REFERRED BY: Popeye Townsend PRIMARY CARE:
 DO LEONIDAS Higginbotham PATIENT OFFICE VISIT
 
 Date of Service: 2014
 
 HISTORY OF PRESENT ILLNESS:
 Viviana Ricci is a 68 y.o. female with a history of CAD post CABG x 3 in , history of 
diabetes, renal failure post a renal transplantation, morbid obesity, inactivity, hypertensi
on, hyperlipidemia, pulmonary hypertension and severe arthritis.  She is being seen today fo
r follow up preop clearance prior to the knee surgery.
 
 She was last seen on 4/13/10 at which time the treatment for pulmonary hypertension was dis
 
cussed.  Since that time, patient has lost to followup.  Today, patient state that she is henry
ffering from a knee pain secondary to a "knock kneed".  She is being seen by the orthopedic 
surgeon for a possible knee surgery/correction.  Because of the severe knee and back pain, h
er physical activity is very limited.  She ambulates very little using a walker.  In the pas
t 3 weeks, she has used a stationary bicycle up to 5 minutes without chest pain but admits h
aving some trouble breathing.  There is no palpitation, dizziness, lightheadedness and leg s
welling.
 
 CURRENT PROBLEMS
 Patient Active Problem List 
 Diagnosis 
   LOW BACK PAIN, CHRONIC 
   HYPOTHYROIDISM 
   HYPERLIPIDEMIA 
   COMPLICATIONS OF TRANSPLANTED KIDNEY 
   MOVEMENT DISORDER 
   DIABETES MELLITUS, TYPE II, UNCONTROLLED 
   Pulmonary hypertension 
   Coronary artery disease 
   Unspecified hypertensive kidney disease with chronic kidney disease stage I through sta
ge IV, or unspecified 
   JACK on CPAP 
   Obesity hypoventilation syndrome 
   Kidney replaced by transplant 
   Secondary hyperparathyroidism 
   Hypervolemia 
   Dyspnea 
   Cough 
   FSGS (focal segmental glomerulosclerosis) 
   Diabetes mellitus, type 2 
   Murmur 
   Cardiomegaly 
   HTN (hypertension) 
   CAD (coronary artery disease) 
 
 MEDICAL, SURGICAL, AND PERSONAL HISTORY
 Past Surgical History 
 Procedure Date 
   Cholecystectomy  
   Insert peritoneal catheter 1998 
   x 2 
   Kidney transplant 2000 
   Total knee arthroplasty  
   Right 
   Hysterectomy 2003 
   Abdominal exploration surgery 2006 
   Parathyroidectomy 2007 
   Carpal tunnel release  
   Coronary artery bypass graft  
   3 
  
 
 Family History 
 Problem Relation Age of Onset 
   Parkinsonism Father  
   Heart disease Father  
   Ovarian cancer Mother  
   Other (See Comment) Brother  
   paralyzed in accident and then  from complications 
 
 
 Family Status 
 Relation Status Death Age 
   Father  67 
   Parkinson's, CHF 
   Mother  83 
   Uterine cancer 
   Brother  58 
   Car accident 
   Brother Alive  
   Healthy 
   Brother  72 
   Parkinson's disease 
 
 History 
 
 Social History 
   Marital Status: Single 
   Spouse Name: N/A 
   Number of Children: 0 
   Years of Education: N/A 
 
 Occupational History 
   .  Disabled 
   Retired 
 
 Social History Main Topics 
   Smoking status: Never Smoker  
   Smokeless tobacco: Never Used 
    Comment: some second hand smoke exposure, but fairly minimal 
   Alcohol Use: No 
   Drug Use: No 
   Sexually Active: None 
 
 Other Topics Concern 
   None 
 
 Social History Narrative 
  Exercise: NoneCaffeine: 1 soda dailyLiving situation: aloneHas a dog at home. No other ani
mal exposures. Had birds as a child. Grew up in [place], [place]. 
 
 CURRENT MEDICATIONS
 Outpatient Encounter Prescriptions as of 2014 
 Medication Sig Dispense Refill 
   allopurinol (ZYLOPRIM) 100 mg tablet Take 1 tablet by mouth Daily.  30 tablet  11 
   aspirin 81 MG EC tablet Take 81 mg by mouth Daily.     
   cholecalciferol (VITAMIN D-3) 2000 UNITS TABS Take 1,000 Units by mouth Three times a w
[place].     
   cinacalcet (SENSIPAR) 30 mg tablet Take 1 tablet by mouth Daily.  30 tablet  12 
   fludrocortisone (FLORINEF) 0.1 mg tablet Take 1 tablet by mouth Every other day.  45 ta
blet  2 
   fluticasone (FLOVENT HFA) 220 mcg/puff inhaler Inhale 1 puff into the lungs 2 times shante
ly. Rinse mouth after use.  1 Inhaler  11 
   furosemide (LASIX) 40 mg tablet Take 1 tablet by mouth Daily.  30 tablet  5 
   glucose blood VI test strips (ONE TOUCH ULTRA TEST) strip Check blood sugar before each
 meal and as directed  100 each  12 
   insulin glargine (LANTUS) 100 units/mL injection Inject 20 Units under the skin every m
orning.  10 mL  prn 
   insulin lispro (HUMALOG) 100 units/mL injection Inject subcutaneously before meals acco
rding to sliding scale  10 pen  5 
   Insulin Syringe-Needle U-100 (BD INSULIN SYRINGE ULTRAFINE) 31G X 5/16" 0.5 ML MISC Use
 
 before meals and as directed.  100 each  11 
   levothyroxine (LEVOTHROID) 50 mcg tablet Take 1 tablet by mouth Daily.  30 tablet  11 
   lisinopril (PRINIVIL,ZESTRIL) 30 MG tablet Take 1 tablet by mouth Daily.  90 tablet  3 
   loperamide (ANTI-DIARRHEAL) 2 mg capsule Take 2 mg by mouth Daily as needed.     
   magnesium oxide (MAG-OX) 400 mg tablet Take 1 tablet by mouth 2 times daily.  62 tablet
  12 
   metoprolol tartrate (LOPRESSOR) 25 mg tablet Take 1 tablet by mouth 2 times daily.  60 
tablet  11 
   mycophenolate (CELLCEPT) 250 mg capsule Take 250 mg by mouth 3 times daily.     
   omeprazole (PRILOSEC) 20 mg capsule Take one capsule by mouth once daily on an empty st
omach  90 capsule  3 
   predniSONE (DELTASONE) 5 mg tablet Take 1 tablet by mouth Daily.  90 tablet  3 
   Prenatal Multivit-Min-Fe-FA (PRENATAL VITAMINS) 0.8 MG TABS Take 0.8 mg by mouth Daily.
  30 each  11 
   Prenatal Vit-Fe Fumarate-FA (PNV PRENATAL PLUS MULTIVITAMIN) 27-1 MG TABS      
   Respiratory Therapy Supplies MISC Decrease CPAP to 12-18 cmH2O Diagnosis Code(s)327.23.
 Please send order to [facility].  1 each  0 
   rosuvastatin (CRESTOR) 20 mg tablet Take 1 tablet by mouth nightly.  30 tablet  11 
   tacrolimus (PROGRAF) 0.5 mg capsule TAD.     
   tacrolimus (PROGRAF) 1 mg capsule Take 1.5 mg by mouth 2 times daily.     
   valsartan (DIOVAN) 160 mg tablet Take 1 tablet by mouth Daily.  30 tablet  11 
  
 
 ALLERGIES
 No Known Allergies
 
 ROS
 Review of Systems 
 Constitutional: Positive for weight loss, malaise/fatigue and diaphoresis. Negative for fev
er and chills. 
 HENT: Positive for neck pain. Negative for hearing loss, nosebleeds, congestion and tinnitu
s.  
 Eyes: Negative for blurred vision and double vision. 
 Respiratory: Negative for cough, shortness of breath and wheezing.  
      Sleep Apnea 
 Cardiovascular: Positive for claudication and leg swelling. Negative for chest pain, palpit
ations and orthopnea. 
 Gastrointestinal: Negative for heartburn, nausea, vomiting, diarrhea, constipation, blood i
n stool and melena. 
 Genitourinary: Negative for urgency, frequency and hematuria. 
 Musculoskeletal: Positive for myalgias and back pain. Negative for joint pain and falls. 
 Skin: Negative for itching and rash. 
 Neurological: Positive for dizziness, tremors and weakness. Negative for tingling, seizures
, loss of consciousness and headaches. 
 Endo/Heme/Allergies: Negative for environmental allergies. Bruises/bleeds easily. 
 Psychiatric/Behavioral: Negative for memory loss. The patient is not nervous/anxious and do
es not have insomnia.  
 
 OBJECTIVE:  
 
 PHYSICAL EXAM
 /49 | Pulse 82 | Resp 20 | Ht 1.676 m (5' 6") | Wt 125.646 kg (277 lb) | BMI 44.73 kg
/m2
 Physical Exam 
 Constitutional: She appears well-developed and well-nourished. No distress. 
      Obese, female individual without acute distress. 
 Neck: Normal carotid pulses, no hepatojugular reflux and no JVD present. Carotid bruit is n
ot present. 
 Cardiovascular: Normal rate, regular rhythm, S1 normal, S2 normal, normal heart sounds, int
act distal pulses and normal pulses.  PMI is not displaced.  Exam reveals no gallop, no S3, 
 
no S4 and no friction rub.  
 No murmur heard.
 Pulses:
      Carotid pulses are 2+ on the right side, and 2+ on the left side.
      Dorsalis pedis pulses are 2+ on the right side, and 2+ on the left side. 
 Pulmonary/Chest: Effort normal and breath sounds normal. No accessory muscle usage. No resp
iratory distress. She has no wheezes. She has no rhonchi. She has no rales. 
 Abdominal: Normal appearance, normal aorta and bowel sounds are normal. She exhibits no abd
ominal bruit. There is no hepatosplenomegaly. There is no tenderness. 
 Musculoskeletal: She exhibits edema (trace bilateral ankle pitting edema.). 
 Neurological: She is alert. Gait normal. 
 Skin: Skin is warm and dry. 
 Psychiatric: She has a normal mood and affect. Her mood appears not anxious. She does not e
xhibit a depressed mood. 
 
 ECG:  Sinus rhythm, first degree AV block, poor RV progression in the anterior wall myocard
ial ischemia.
 
 LAB RESULTS: 
 LAB RESULTS: 
 LIPID
 Lab Results 
 Component Value Date 
  CHOL 162 2013 
  TRIG 225 2013 
  HDL 45 2013 
  LDL 72 2013 
  LDLEX 65 3/12/2014 
  HDLEX 50.2 3/12/2014 
  TRIGEX 197 3/12/2014 
  CHOLEX 155 3/12/2014 
 
 CHEMISTRY
 Lab Results 
 Component Value Date 
   3/12/2014 
   3/12/2014 
  K 5.0 3/12/2014 
   3/12/2014 
  CO2 21 3/12/2014 
  CALCIUM 10.2 3/12/2014 
  ALKPHOS 102 3/12/2014 
  AST 20 7/10/2013 
  ASTEX 23 3/12/2014 
  ALT 14 7/10/2013 
  ALTEX 16 3/12/2014 
  BILITOT 0.5 3/12/2014 
  CREA 1.7 7/10/2013 
  BUN 44 3/12/2014 
  EGFR 31.0 7/10/2013 
  EGFREX 37 3/12/2014 
  CREEX 1.43 3/12/2014 
 
 HEMATOLOGY
 Lab Results 
 Component Value Date 
  WBC 4.6 7/10/2013 
  HGB 11.9 7/10/2013 
  HCT 35.7 7/10/2013 
  * 7/10/2013 
 
  HGBEX 13.3 3/12/2014 
 
 I personally reviewed records from another healthcare provider.
 
 ASSESSMENT:  
 1. Coronary artery disease preop clearance prior to the knee surgery
 A. Status post CABG x 3 in .
             B. A persantine sestamibi stress test on 07 revealed a medium sized, mode
rate in severity fixed defect of the anterior wall, and a small sized mild in severity fixed
 defect of the inferior wall, LVEF by gated SPECT was 66%.
  C. BHC on 05/03/10, shows severe two vessel coronary artery disease, a chronic occlusion t
hrough the proximal portion of the left anterior descending artery and a chronic occlusion t
hrough the proximal portion of the left circumflex artery, grafts: open left internal mammar
y artery graft to the mid left anterior descending artery, open saphenous vein grafts to the
 OM1 and OM2, mildly dilated left ventricular cavity with a normal left systolic function, L
VEF 70%, mild to moderate pulmonary hypertension and a normal cardiac output, coronary circu
lation is right dominant, normal system pressure.
  D. Because of the severe knee and back pain, her physical activity is very limited.  She a
mbulates very little using a walker. In the past 3 weeks, she has used a stationary bicycle 
up to 5 minutes without chest pain but admits having some trouble breathing.    There is no 
signs and symptoms of overt congestive heart failure.  She is in a class II of [place] [employer] functional class.  There is no fluid retention on physical examination.
  EKG shows a poor RV progression cannot rule out anterior wall myocardial ischemia. Patient
 meets a Persantine stress test to risk stratify her CAD prior to the knee surgery.
 2. Severe arthritis
  A. Patient state that she is suffering from a knee pain secondary to a "knock kneed".  She
 is being seen by the orthopedic surgeon for a possible knee surgery/correction.   
 3. Right sided heart failure/ cor pulmonale / severe pulmonary hypertension:
             A. The echocardiogram from 02/19/10 revealed moderate bi-atrial dilatation and 
normal left ventricular size, wall thickness and motion, LVEF is 55-60%, dilated right ventr
icle with moderate hypo-kinesis, moderate tricuspid valve regurgitation, severe pulmonary hy
pertension with a peak systolic pressure of 80 mmHg, and a small pericardial effusion. 
  B. Echocardiogram from 4/15/14 showed Mild left atrial dilatation. Normal left ventricular
 size, wall thickness and motion. Preserved  left ventricular systolic function. LVEF is 70-
75%. Grade 1 left ventricular diastole dysfunction. Mild tricuspid valve regurgitation. Mild
 thickened trileaflet aortic valve with adequate opening. A mild aortic valve insufficiency.
 Normal right-sided pressure.
  C. Apparently, recent echo shows a normal right-sided pressure.
 4. Morbid obesity.
 5. Suspecting obstructive sleep apnea:
 6. History of hypertension:
  A. Good blood pressure.
 7. History of diabetes.
 8. History of inactivity.
 9. History of chronic renal insufficiency:
 
 PLAN:  
 1. Persantine SPECT MPI is indicated to risk stratify her CAD prior to any surgery.
 2. I recommend crosstraining i.e.pool therapy.
 3. followup in 2-4 weeks.
 
 Portions of this report were transcribed using voice recognition software.  Every effort wa
s made to ensure accuracy; however, inadvertent computerized transcription errors may be pre
sent.
 Electronically signed by: Popeye Townsend MD [facility] 2014 14:00  Electronically signed 
by Popeye Townsend MD at 2014  4:31 PM PDTdocumented in this encounter
 
 Plan of Treatment
 
 
 
+--------+-----------+------------+----------------------+-------------------+
| Date   | Type      | Specialty  | Care Team            | Description       |
+--------+-----------+------------+----------------------+-------------------+
| / | Office    | Cardiology |   Tonia Wilson,    |                   |
|    | Visit     |            | ARNP  [address]   |                   |
|        |           |            |   [place]  |                   |
|        |           |            | [address]  [phone]  |                   |
|        |           |            |  [phone] (Fax)  |                   |
+--------+-----------+------------+----------------------+-------------------+
| / | Hospital  | Radiology  |   Popeye Townsend, |                   |
|    | Encounter |            |  MD  [address] |                   |
|        |           |            |  St.  [place],   |                   |
|        |           |            | [place] [address]             |                   |
|        |           |            | [phone]         |                   |
|        |           |            | [phone] (Fax)   |                   |
+--------+-----------+------------+----------------------+-------------------+
| / | Surgery   | Radiology  |   Popeye Townsend, | CV EP PPM SYSTEM  |
|    |           |            |  MD  [address] | IMPLANT           |
|        |           |            |  St.  [place],   |                   |
|        |           |            | [place] [address]             |                   |
|        |           |            | [phone]         |                   |
|        |           |            | [phone] (Fax)   |                   |
+--------+-----------+------------+----------------------+-------------------+
| 02/10/ | Clinical  | Cardiology |                      |                   |
2020   | Support   |            |                      |                   |
+--------+-----------+------------+----------------------+-------------------+
| / | Office    | Cardiology |   Tonia Wilson,    |                   |
|    | Visit     |            | [facility]  [address]   |                   |
|        |           |            | St  [place]  |                   |
|        |           |            | [id number]  [phone]  |                   |
|        |           |            |  [phone] (Fax)  |                   |
+--------+-----------+------------+----------------------+-------------------+
| / | Off-Site  | Nephrology |   Marzena Moser  |                   |
2020   | Visit     |            | DO [facility]  [address] [place] [address]      |                   |
|        |           |            | [address]      |                   |
|        |           |            | [place]      |                   |
|        |           |            | [address]  [phone]  |                   |
|        |           |            |  [phone] (Fax)  |                   |
+--------+-----------+------------+----------------------+-------------------+
 documented as of this encounter
 
 Results
 NM Nuclear Stress Test (Vasodilator) (2014 11:56 AM PDT)
 
+----------+
| Specimen |
+----------+
|          |
+----------+
 
 
 
+------------------------------------------------------------------------+-----------------+
| Impressions                                                            | Performed At    |
+------------------------------------------------------------------------+-----------------+
|      1.    Persantine EKG is negative.  2.   Normal   Persantine       |   [facility]    |
| Sestamibi myocardial perfusion study with a normal   left ventricular  | [facility]        |
| size and wall thickness.   Preserved left ventricular   systolic       | [facility]  |
| function.   LVEF by gated SPECT 78%.              Signed by: Popeye    | - IMAGING       |
| MD [facility]     2014, 12:12                                |                 |
 
+------------------------------------------------------------------------+-----------------+
 
 
 
+------------------------------------------------------------------------+-----------------+
| Narrative                                                              | Performed At    |
+------------------------------------------------------------------------+-----------------+
|                     NUCLEAR MEDICINE STRESS TEST REPORT                |   [facility]    |
| Patient Name: Viviana Ricci   Study Date:   2014   Primary Care  | [facility]        |
| Provider: Marezna Moser DO   MRN:   [id number]   :           | [facility]  |
|   1945 Age:   68 y.o. Gender: female      CLINICAL               | - IMAGING       |
| HISTORY/DIAGNOSIS:   Chest pain      PERSANTINE SESTAMIBI STRESS TEST  |                 |
|  Indication:  Chest pain      Procedure:   In the supine position, 60  |                 |
| mg of   Persantine was infused intravenously   over 4 minutes.   Blood |                 |
|  pressure and EKG were monitored every 1 minute.   5   mL of normal    |                 |
| saline was utilized to flush the IV line.   2.5 minutes later,   10.2  |                 |
| mCi sestamibi intravenous injection.   SPECT myocardial perfusion      |                 |
| imaging was acquired with wall motion analysis.   Rest imaging was     |                 |
| performed using 31.8 mCi Sestamibi intravenous injection.   Repeated   |                 |
| SPECT   myocardial perfusion imaging was acquired with wall motion     |                 |
| analysis.   At   the end of the procedure, 75 mg of aminophylline was  |                 |
| infused   intravenously.     Hemodynamics:     Heart rate baseline 85  |                 |
| beats per minute, peak 90 beats per minute.   Blood   pressure         |                 |
| baseline 108/70 mmHg, peak 100/72mmHg.   EKG baseline underlying       |                 |
| sinus rhythm.   Normal EKG.   Peak unchanged.     Side Effects:        |                 |
|   None.     Arrhythmia:   None.     Persantine sestamibi Myocardial    |                 |
| Perfusion Imaging Result:          The Persantine Sestamibi            |                 |
| tomographic images, reviewed without the   attenuation compensation    |                 |
| resolution, revealed a normal myocardial   perfusion pattern as seen   |                 |
| in short axis, vertical long axis, and   horizontal long axis          |                 |
| projections. The left ventricular cavity is normal.   The rest imaging |                 |
|  is also normal.               Gated SPECT reveals a normal left       |                 |
| ventricular wall thickness and motion.     Preserved left ventricular  |                 |
| systolic function. LVEF by gated SPECT is 78%.                         |                 |
+------------------------------------------------------------------------+-----------------+
 
 
 
+-------------------------------------------------------------------------------------------
--------------------------------------------------------------------------------------------
----------------+
| Procedure Note                                                                            
                                                                                            
                |
+-------------------------------------------------------------------------------------------
--------------------------------------------------------------------------------------------
----------------+
|   Popeye Townsend MD - 2014  1:03 PM PDT  Formatting of this note might be       
                                                                                            
                |
| different from the original.     NUCLEAR MEDICINE STRESS TEST REPORT Patient Name: Viviana   
                                                                                            
                |
| Deedee Ricci  Study Date:  2014 Primary Care Provider: Marzena Moser DO  MRN:     
                                                                                            
                |
| [id number] :  1945 Age:  68 y.o. Gender: female   CLINICAL HISTORY/DIAGNOSIS:   
                                                                                            
                |
|  Chest pain PERSANTINE SESTAMIBI STRESS TESTIndication:Chest pain Procedure: In the       
 
                                                                                            
                |
| supine position, 60 mg of  Persantine was infused intravenously over 4 minutes.  Blood    
                                                                                            
                |
| pressure and EKG were monitored every 1 minute.  5 mL of normal saline was utilized to    
                                                                                            
                |
| flush the IV line.  2.5 minutes later, 10.2 mCi sestamibi intravenous injection.  SPECT   
                                                                                            
                |
| myocardial perfusion imaging was acquired with wall motion analysis.  Rest imaging was    
                                                                                            
                |
| performed using 31.8 mCi Sestamibi intravenous injection.  Repeated SPECT myocardial      
                                                                                            
                |
| perfusion imaging was acquired with wall motion analysis.  At the end of the procedure,   
                                                                                            
                |
| 75 mg of aminophylline was infused intravenously.Hemodynamics:  Heart rate baseline 85    
                                                                                            
                |
| beats per minute, peak 90 beats per minute.  Blood pressure baseline 108/70 mmHg, peak    
                                                                                            
                |
| 100/72mmHg.  EKG baseline underlying sinus rhythm.  Normal EKG.  Peak unchanged.Side      
                                                                                            
                |
| Effects:  None.Arrhythmia:  None.Persantine sestamibi Myocardial Perfusion Imaging        
                                                                                            
                |
| Result:      The Persantine Sestamibi tomographic images, reviewed without the            
                                                                                            
                |
| attenuation compensation resolution, revealed a normal myocardial perfusion pattern as    
                                                                                            
                |
| seen in short axis, vertical long axis, and horizontal long axis projections. The left    
                                                                                            
                |
| ventricular cavity is normal. The rest imaging is also normal.        Gated SPECT         
                                                                                            
                |
| reveals a normal left ventricular wall thickness and motion.  Preserved left ventricular  
                                                                                            
                |
|  systolic function. LVEF by gated SPECT is 78%.   IMPRESSION: 1.   Persantine EKG is      
                                                                                            
                |
| negative.2.  Normal  Persantine Sestamibi myocardial perfusion study with a normal left   
                                                                                            
                |
| ventricular size and wall thickness.  Preserved left ventricular systolic function.       
                                                                                            
                |
| LVEF by gated SPECT 78%.Signed by: Popeye Townsend MD [facility]  2014, 12:12            
                                                                                            
                |
|                                                                                           
 
                                                                                            
                |
|Side Effects:  None.                                                                       
                                                                                            
                |
|                                                                                           
                                                                                            
                |
|Arrhythmia:  None.                                                                         
                                                                                            
                |
|                                                                                           
                                                                                            
                |
|Persantine sestamibi Myocardial Perfusion Imaging Result:                                  
                                                                                            
                |
| The Persantine Sestamibi tomographic images, reviewed without the attenuation compensation
 resolution, revealed a normal myocardial perfusion pattern as seen in short axis, vertical 
long axis, and  |
|horizontal long axis projections. The left ventricular cavity is normal. The rest imaging i
s also normal.                                                                              
                |
|                                                                                           
                                                                                            
                |
| Gated SPECT reveals a normal left ventricular wall thickness and motion.  Preserved left v
entricular systolic function. LVEF by gated SPECT is 78%.                                   
                |
|                                                                                           
                                                                                            
                |
|IMPRESSION:                                                                                
                                                                                            
                |
|                                                                                           
                                                                                            
                |
|1.   Persantine EKG is negative.                                                           
                                                                                            
                |
|2.  Normal  Persantine Sestamibi myocardial perfusion study with a normal left ventricular 
size and wall thickness.  Preserved left ventricular systolic function.  LVEF by gated SPECT
 78%.           |
|                                                                                           
                                                                                            
                |
|                                                                                           
                                                                                            
                |
|                                                                                           
                                                                                            
                |
|                                                                                           
                                                                                            
                |
|Signed by: Popeye Townsend MD [facility]                                                       
                                                                                            
                |
|  2014, 12:12                                                                         
 
                                                                                            
                |
+-------------------------------------------------------------------------------------------
--------------------------------------------------------------------------------------------
----------------+
 
 
 
+----------------------+---------------------+--------------------+----------------+
| Performing           | Address             | City/State/Zipcode | Phone Number   |
| Organization         |                     |                    |                |
+----------------------+---------------------+--------------------+----------------+
|   [facility]     |   [address] | [place]    |   [phone] |
| [facility]  |                     | [id number]              |                |
| - IMAGING            |                     |                    |                |
+----------------------+---------------------+--------------------+----------------+
 documented in this encounter
 
 Visit Diagnoses
 
 
+------------------------------------------------------------------------------------------+
| Diagnosis                                                                                |
+------------------------------------------------------------------------------------------+
|   Other chest pain - Primary                                                             |
+------------------------------------------------------------------------------------------+
|   CAD (coronary artery disease)  Coronary atherosclerosis of unspecified type of vessel, |
|  native or graft                                                                         |
+------------------------------------------------------------------------------------------+
|   Pre-op evaluation  Preoperative examination, unspecified                               |
+------------------------------------------------------------------------------------------+
 documented in this encounter

## 2020-01-08 NOTE — XMS
Encounter Summary
  Created on: 2020
 
 Viviana Ricci
 External Reference #: [id number]
 : 47
 Sex: Female
 
 Demographics
 
 
+-----------------------+----------------------+
| Address               | [address]      |
|                       | [place]  [address] |
+-----------------------+----------------------+
| Home Phone            | [phone]      |
+-----------------------+----------------------+
| Preferred Language    | Unknown              |
+-----------------------+----------------------+
| Marital Status        | Single               |
+-----------------------+----------------------+
| Restorationism Affiliation | [id number]                 |
+-----------------------+----------------------+
| Race                  | Unknown              |
+-----------------------+----------------------+
| Ethnic Group          | Unknown              |
+-----------------------+----------------------+
 
 
 Author
 
 
+--------------+--------------------------------------------+
| Author       | [facility]  |
|              | and [facility]                                |
+--------------+--------------------------------------------+
| Organization | [facility]  |
|              | and [facility]                                |
+--------------+--------------------------------------------+
| Address      | Unknown                                    |
+--------------+--------------------------------------------+
| Phone        | Unavailable                                |
+--------------+--------------------------------------------+
 
 
 
 Support
 
 
+----------------+--------------+---------------------+-----------------+
| Name           | Relationship | Address             | Phone           |
+----------------+--------------+---------------------+-----------------+
| Ada/Ed Radhames | ECON         | [address]              | [phone] |
|                |              | [place]  |                 |
|                |              |  [id number]              |                 |
+----------------+--------------+---------------------+-----------------+
 
 
 
 
 Care Team Providers
 
 
+-----------------------+------+-------------+
| Care Team Member Name | Role | Phone       |
+-----------------------+------+-------------+
 PCP  | Unavailable |
+-----------------------+------+-------------+
 
 
 
 Encounter Details
 
 
+--------+----------+---------------------+----------------------+-------------+
| Date   | Type     | Department          | Care Team            | Description |
+--------+----------+---------------------+----------------------+-------------+
| / | Abstract |   [facility]  [place]         |   Marzena Moser  |             |
|    |          | NEPHROLOGY  [address]   | M, DO  [address]      |             |
|        |          | [address]   | [address]      |             |
|        |          | [place], [place]     | [place]      |             |
|        |          | [address]          | [address]  [phone]  |             |
|        |          | [phone]        |  [phone] (Fax)  |             |
+--------+----------+---------------------+----------------------+-------------+
 
 
 
 Social History
 
 
+--------------+-------+-----------+--------+------+
| Tobacco Use  | Types | Packs/Day | Years  | Date |
|              |       |           | Used   |      |
+--------------+-------+-----------+--------+------+
| Never Smoker |       |           |        |      |
+--------------+-------+-----------+--------+------+
 
 
 
+---------------------+---+---+---+
| Smokeless Tobacco:  |   |   |   |
| Never Used          |   |   |   |
+---------------------+---+---+---+
 
 
 
+---------------------------------------------------------------+
| Comments: some second hand smoke exposure, but fairly minimal |
+---------------------------------------------------------------+
 
 
 
+-------------+-------------+---------+----------+
| Alcohol Use | Drinks/Week | oz/Week | Comments |
+-------------+-------------+---------+----------+
| No          |             |         |          |
+-------------+-------------+---------+----------+
 
 
 
 
+------------------+---------------+
| Sex Assigned at  | Date Recorded |
| Birth            |               |
+------------------+---------------+
| Not on file      |               |
+------------------+---------------+
 
 
 
+----------------+-------------+-------------+
| Job Start Date | Occupation  | Industry    |
+----------------+-------------+-------------+
| Not on file    | Not on file | Not on file |
+----------------+-------------+-------------+
 
 
 
+----------------+--------------+------------+
| Travel History | Travel Start | Travel End |
+----------------+--------------+------------+
 
 
 
+-------------------------------------+
| No recent travel history available. |
+-------------------------------------+
 documented as of this encounter
 
 Plan of Treatment
 
 
+--------+-----------+------------+----------------------+-------------------+
| Date   | Type      | Specialty  | Care Team            | Description       |
+--------+-----------+------------+----------------------+-------------------+
| / | Office    | Cardiology |   Tonia Wilson,    |                   |
|    | Visit     |            | ARNJESSICA  [address]   |                   |
|        |           |            | [place]  |                   |
|        |           |            | [address]  [phone]  |                   |
|        |           |            |  [phone] (Fax)  |                   |
+--------+-----------+------------+----------------------+-------------------+
| / | Hospital  | Radiology  |   Popeye Townsend, |                   |
|    | Encounter |            |  MD  [address] |                   |
|        |           |            |  St.  [place],   |                   |
|        |           |            | [place] [address]             |                   |
|        |           |            | [phone]         |                   |
|        |           |            | [phone] (Fax)   |                   |
+--------+-----------+------------+----------------------+-------------------+
| / | Surgery   | Radiology  |   Popeye Townsend, | CV EP PPM SYSTEM  |
|    |           |            |  MD  [address] | IMPLANT           |
|        |           |            |  St.  [place],   |                   |
|        |           |            | [place] [address]             |                   |
|        |           |            | [phone]         |                   |
|        |           |            | [phone] (Fax)   |                   |
+--------+-----------+------------+----------------------+-------------------+
| 02/10/ | Clinical  | Cardiology |                      |                   |
|    | Support   |            |                      |                   |
+--------+-----------+------------+----------------------+-------------------+
| / | Office    | Cardiology |   Tonia Wilson,    |                   |
|    | Visit     |            | ARNP  [address]   |                   |
 
|        |           |            | St  [place], [place]  |                   |
|        |           |            | [address]  [phone]  |                   |
|        |           |            |  [phone] (Fax)  |                   |
+--------+-----------+------------+----------------------+-------------------+
| / | Off-Site  | Nephrology |   Marzena Moser  |                   |
| 2020   | Visit     |            | DO ETIENNE  [address] [place] [address]      |                   |
|        |           |            | [address]      |                   |
|        |           |            | [place]      |                   |
|        |           |            | [address]  [phone]  |                   |
|        |           |            |  [phone] (Fax)  |                   |
+--------+-----------+------------+----------------------+-------------------+
 documented as of this encounter
 
 Procedures
 
 
+----------------------+--------+-------------+----------------------+----------------------
+
| Procedure Name       | Priori | Date/Time   | Associated Diagnosis | Comments             
|
|                      | ty     |             |                      |                      
|
+----------------------+--------+-------------+----------------------+----------------------
+
| EXTERNAL LAB:        | Routin | 2015  |                      |   Results for this   
|
| PHOSPHORUS           | e      |  2:05 AM    |                      | procedure are in the 
|
|                      |        | PDT         |                      |  results section.    
|
+----------------------+--------+-------------+----------------------+----------------------
+
| EXTERNAL LAB: BUN    | Routin | 2015  |                      |   Results for this   
|
|                      | e      |             |                      | procedure are in the 
|
|                      |        |             |                      |  results section.    
|
+----------------------+--------+-------------+----------------------+----------------------
+
| EXTERNAL LAB:        | Routin | 2015  |                      |   Results for this   
|
| GLUCOSE              | e      |             |                      | procedure are in the 
|
|                      |        |             |                      |  results section.    
|
+----------------------+--------+-------------+----------------------+----------------------
+
| EXTERNAL LAB:        | Routin | 2015  |                      |   Results for this   
|
| TACROLIMUS LEVEL,    | e      |             |                      | procedure are in the 
|
| CMIA                 |        |             |                      |  results section.    
|
+----------------------+--------+-------------+----------------------+----------------------
+
| EXTERNAL LAB: BK     | Routin | 2015  |                      |   Results for this   
|
| VIRUS, NAAT, URINE,  | e      |             |                      | procedure are in the 
|
 
| QUANT                |        |             |                      |  results section.    
|
+----------------------+--------+-------------+----------------------+----------------------
+
| EXTERNAL LAB: ALT    | Routin | 2015  |                      |   Results for this   
|
|                      | e      |             |                      | procedure are in the 
|
|                      |        |             |                      |  results section.    
|
+----------------------+--------+-------------+----------------------+----------------------
+
| EXTERNAL LAB: AST    | Routin | 2015  |                      |   Results for this   
|
|                      | e      |             |                      | procedure are in the 
|
|                      |        |             |                      |  results section.    
|
+----------------------+--------+-------------+----------------------+----------------------
+
| EXTERNAL LAB:        | Routin | 2015  |                      |   Results for this   
|
| ALKALINE PHOSPHATASE | e      |             |                      | procedure are in the 
|
|                      |        |             |                      |  results section.    
|
+----------------------+--------+-------------+----------------------+----------------------
+
| EXTERNAL LAB:        | Routin | 2015  |                      |   Results for this   
|
| BILIRUBIN, TOTAL     | e      |             |                      | procedure are in the 
|
|                      |        |             |                      |  results section.    
|
+----------------------+--------+-------------+----------------------+----------------------
+
| EXTERNAL LAB:        | Routin | 2015  |                      |   Results for this   
|
| ALBUMIN              | e      |             |                      | procedure are in the 
|
|                      |        |             |                      |  results section.    
|
+----------------------+--------+-------------+----------------------+----------------------
+
| EXTERNAL LAB:        | Routin | 2015  |                      |   Results for this   
|
| PROTEIN, TOTAL       | e      |             |                      | procedure are in the 
|
|                      |        |             |                      |  results section.    
|
+----------------------+--------+-------------+----------------------+----------------------
+
| EXTERNAL LAB:        | Routin | 2015  |                      |   Results for this   
|
| MAGNESIUM            | e      |             |                      | procedure are in the 
|
|                      |        |             |                      |  results section.    
|
+----------------------+--------+-------------+----------------------+----------------------
+
 
| EXTERNAL LAB:        | Routin | 2015  |                      |   Results for this   
|
| CALCIUM              | e      |             |                      | procedure are in the 
|
|                      |        |             |                      |  results section.    
|
+----------------------+--------+-------------+----------------------+----------------------
+
| EXTERNAL LAB: CARBON | Routin | 2015  |                      |   Results for this   
|
|  DIOXIDE             | e      |             |                      | procedure are in the 
|
|                      |        |             |                      |  results section.    
|
+----------------------+--------+-------------+----------------------+----------------------
+
| EXTERNAL LAB:        | Routin | 2015  |                      |   Results for this   
|
| CHLORIDE             | e      |             |                      | procedure are in the 
|
|                      |        |             |                      |  results section.    
|
+----------------------+--------+-------------+----------------------+----------------------
+
| EXTERNAL LAB:        | Routin | 2015  |                      |   Results for this   
|
| POTASSIUM            | e      |             |                      | procedure are in the 
|
|                      |        |             |                      |  results section.    
|
+----------------------+--------+-------------+----------------------+----------------------
+
| EXTERNAL LAB: SODIUM | Routin | 2015  |                      |   Results for this   
|
|                      | e      |             |                      | procedure are in the 
|
|                      |        |             |                      |  results section.    
|
+----------------------+--------+-------------+----------------------+----------------------
+
| EXTERNAL LAB: EVY,   | Routin | 2015  |                      |   Results for this   
|
| INTACT               | e      |             |                      | procedure are in the 
|
|                      |        |             |                      |  results section.    
|
+----------------------+--------+-------------+----------------------+----------------------
+
| EXTERNAL LAB: BILLY    | Routin | 2015  |                      |   Results for this   
|
|                      | e      |             |                      | procedure are in the 
|
|                      |        |             |                      |  results section.    
|
+----------------------+--------+-------------+----------------------+----------------------
+
| EXTERNAL LAB:        | Routin | 2015  |                      |   Results for this   
|
| TRIGLYCERIDES        | e      |             |                      | procedure are in the 
|
 
|                      |        |             |                      |  results section.    
|
+----------------------+--------+-------------+----------------------+----------------------
+
| EXTERNAL LAB:        | Routin | 2015  |                      |   Results for this   
|
| CHOLESTEROL, HDL     | e      |             |                      | procedure are in the 
|
|                      |        |             |                      |  results section.    
|
+----------------------+--------+-------------+----------------------+----------------------
+
| EXTERNAL LAB:        | Routin | 2015  |                      |   Results for this   
|
| CHOLESTEROL, TOTAL   | e      |             |                      | procedure are in the 
|
|                      |        |             |                      |  results section.    
|
+----------------------+--------+-------------+----------------------+----------------------
+
| EXTERNAL LAB:        | Routin | 2015  |                      |   Results for this   
|
| CHOLESTEROL, LDL     | e      |             |                      | procedure are in the 
|
|                      |        |             |                      |  results section.    
|
+----------------------+--------+-------------+----------------------+----------------------
+
| EXTERNAL LAB: EGFR   | Routin | 2015  |                      |   Results for this   
|
|                      | e      |             |                      | procedure are in the 
|
|                      |        |             |                      |  results section.    
|
+----------------------+--------+-------------+----------------------+----------------------
+
| EXTERNAL LAB:        | Routin | 2015  |                      |   Results for this   
|
| CREATININE           | e      |             |                      | procedure are in the 
|
|                      |        |             |                      |  results section.    
|
+----------------------+--------+-------------+----------------------+----------------------
+
| HEMOGLOBIN A1C       | Routin | 2015  |                      |   Results for this   
|
|                      | e      |             |                      | procedure are in the 
|
|                      |        |             |                      |  results section.    
|
+----------------------+--------+-------------+----------------------+----------------------
+
 documented in this encounter
 
 Results
 External Lab: Phosphorus (2015  2:05 AM PDT)
 
+-------------+-------+-----------+------------+--------------+
| Component   | Value | Ref Range | Performed  | Pathologist  |
|             |       |           | At         | Signature    |
 
+-------------+-------+-----------+------------+--------------+
| Phosphorus, | 2.8   | 2.5 - 5   | EXTERNAL   |              |
|  External   |       |           | LAB        |              |
+-------------+-------+-----------+------------+--------------+
 
 
 
+--------------------------+
| Resulting Agency Comment |
+--------------------------+
|   [facility]              |
+--------------------------+
 
 
 
+----------------+---------+--------------------+--------------+
| Performing     | Address | City/State/Zipcode | Phone Number |
| Organization   |         |                    |              |
+----------------+---------+--------------------+--------------+
|   EXTERNAL LAB |         |                    |              |
+----------------+---------+--------------------+--------------+
 External Lab: Bk Virus, NAAT Urine, Quant (2015)
 
+-------------+--------+-----------+------------+--------------+
| Component   | Value  | Ref Range | Performed  | Pathologist  |
|             |        |           | At         | Signature    |
+-------------+--------+-----------+------------+--------------+
| BKV, Urine, | 74,500 |           | EXTERNAL   |              |
|  PCR,       |        |           | LAB        |              |
| External    |        |           |            |              |
+-------------+--------+-----------+------------+--------------+
 
 
 
+----------+
| Specimen |
+----------+
|          |
+----------+
 
 
 
+----------------+---------+--------------------+--------------+
| Performing     | Address | City/State/Zipcode | Phone Number |
| Organization   |         |                    |              |
+----------------+---------+--------------------+--------------+
|   EXTERNAL LAB |         |                    |              |
+----------------+---------+--------------------+--------------+
 External Lab: Tacrolimus Level, CMIA (2015)
 
+-------------+-------+-----------+------------+--------------+
| Component   | Value | Ref Range | Performed  | Pathologist  |
|             |       |           | At         | Signature    |
+-------------+-------+-----------+------------+--------------+
| Tacrolimus, | 5.5   |           | EXTERNAL   |              |
|  CMIA,      |       |           | LAB        |              |
| External    |       |           |            |              |
+-------------+-------+-----------+------------+--------------+
 
 
 
 
+----------+
| Specimen |
+----------+
|          |
+----------+
 
 
 
+----------------+---------+--------------------+--------------+
| Performing     | Address | City/State/Zipcode | Phone Number |
| Organization   |         |                    |              |
+----------------+---------+--------------------+--------------+
|   EXTERNAL LAB |         |                    |              |
+----------------+---------+--------------------+--------------+
 External Lab: PTH, Intact (2015)
 
+-------------+-------+-----------+------------+--------------+
| Component   | Value | Ref Range | Performed  | Pathologist  |
|             |       |           | At         | Signature    |
+-------------+-------+-----------+------------+--------------+
| PTH Intact, | 203.6 |           | EXTERNAL   |              |
|  External   |       |           | LAB        |              |
+-------------+-------+-----------+------------+--------------+
 
 
 
+----------+
| Specimen |
+----------+
|          |
+----------+
 
 
 
+----------------+---------+--------------------+--------------+
| Performing     | Address | City/State/Zipcode | Phone Number |
| Organization   |         |                    |              |
+----------------+---------+--------------------+--------------+
|   EXTERNAL LAB |         |                    |              |
+----------------+---------+--------------------+--------------+
 Hemoglobin A1C (2015)
 
+-------------+-------+-----------+------------+--------------+
| Component   | Value | Ref Range | Performed  | Pathologist  |
|             |       |           | At         | Signature    |
+-------------+-------+-----------+------------+--------------+
| Hemoglobin  | 7.0   | %         | EXTERNAL   |              |
| A1c         |       |           | LAB        |              |
+-------------+-------+-----------+------------+--------------+
 
 
 
+-----------------+
| Specimen        |
+-----------------+
| Blood specimen  |
| (specimen)      |
+-----------------+
 
 
 
 
+--------------------------+
| Resulting Agency Comment |
+--------------------------+
|   [facility]              |
+--------------------------+
 
 
 
+----------------+---------+--------------------+--------------+
| Performing     | Address | City/State/Zipcode | Phone Number |
| Organization   |         |                    |              |
+----------------+---------+--------------------+--------------+
|   EXTERNAL LAB |         |                    |              |
+----------------+---------+--------------------+--------------+
 External Lab: ALMA (2015)
 
+-----------+--------+-----------+------------+--------------+
| Component | Value  | Ref Range | Performed  | Pathologist  |
|           |        |           | At         | Signature    |
+-----------+--------+-----------+------------+--------------+
| ALMA,      | 33 (A) | 6 - 23    | EXTERNAL   |              |
| External  |        |           | LAB        |              |
+-----------+--------+-----------+------------+--------------+
 
 
 
+--------------------------+
| Resulting Agency Comment |
+--------------------------+
|   [facility]              |
+--------------------------+
 
 
 
+----------------+---------+--------------------+--------------+
| Performing     | Address | City/State/Zipcode | Phone Number |
| Organization   |         |                    |              |
+----------------+---------+--------------------+--------------+
|   EXTERNAL LAB |         |                    |              |
+----------------+---------+--------------------+--------------+
 External Lab: Glucose (2015)
 
+-----------+---------+-----------+------------+--------------+
| Component | Value   | Ref Range | Performed  | Pathologist  |
|           |         |           | At         | Signature    |
+-----------+---------+-----------+------------+--------------+
| Glucose,  | 128 (A) | 70 - 100  | EXTERNAL   |              |
| External  |         |           | LAB        |              |
+-----------+---------+-----------+------------+--------------+
 
 
 
+--------------------------+
| Resulting Agency Comment |
+--------------------------+
|   [facility]              |
+--------------------------+
 
 
 
 
+----------------+---------+--------------------+--------------+
| Performing     | Address | City/State/Zipcode | Phone Number |
| Organization   |         |                    |              |
+----------------+---------+--------------------+--------------+
|   EXTERNAL LAB |         |                    |              |
+----------------+---------+--------------------+--------------+
 External Lab: ALT (2015)
 
+-----------+-------+-----------+------------+--------------+
| Component | Value | Ref Range | Performed  | Pathologist  |
|           |       |           | At         | Signature    |
+-----------+-------+-----------+------------+--------------+
| ALT,      | 16    | 7 - 52    | EXTERNAL   |              |
| External  |       |           | LAB        |              |
+-----------+-------+-----------+------------+--------------+
 
 
 
+--------------------------+
| Resulting Agency Comment |
+--------------------------+
|   [facility]              |
+--------------------------+
 
 
 
+----------------+---------+--------------------+--------------+
| Performing     | Address | City/State/Zipcode | Phone Number |
| Organization   |         |                    |              |
+----------------+---------+--------------------+--------------+
|   EXTERNAL LAB |         |                    |              |
+----------------+---------+--------------------+--------------+
 External Lab: AST (2015)
 
+-----------+-------+-----------+------------+--------------+
| Component | Value | Ref Range | Performed  | Pathologist  |
|           |       |           | At         | Signature    |
+-----------+-------+-----------+------------+--------------+
| AST,      | 24    | 13 - 39   | EXTERNAL   |              |
| External  |       |           | LAB        |              |
+-----------+-------+-----------+------------+--------------+
 
 
 
+--------------------------+
| Resulting Agency Comment |
+--------------------------+
|   [facility]              |
+--------------------------+
 
 
 
+----------------+---------+--------------------+--------------+
| Performing     | Address | City/State/Zipcode | Phone Number |
| Organization   |         |                    |              |
+----------------+---------+--------------------+--------------+
|   EXTERNAL LAB |         |                    |              |
+----------------+---------+--------------------+--------------+
 
 External Lab: Alkaline Phosphatase (2015)
 
+-----------+-------+-----------+------------+--------------+
| Component | Value | Ref Range | Performed  | Pathologist  |
|           |       |           | At         | Signature    |
+-----------+-------+-----------+------------+--------------+
| ALP,      | 93    | 30 - 128  | EXTERNAL   |              |
| External  |       |           | LAB        |              |
+-----------+-------+-----------+------------+--------------+
 
 
 
+--------------------------+
| Resulting Agency Comment |
+--------------------------+
|   [facility]              |
+--------------------------+
 
 
 
+----------------+---------+--------------------+--------------+
| Performing     | Address | City/State/Zipcode | Phone Number |
| Organization   |         |                    |              |
+----------------+---------+--------------------+--------------+
|   EXTERNAL LAB |         |                    |              |
+----------------+---------+--------------------+--------------+
 External Lab: Bilirubin, Total (2015)
 
+-------------+-------+-----------+------------+--------------+
| Component   | Value | Ref Range | Performed  | Pathologist  |
|             |       |           | At         | Signature    |
+-------------+-------+-----------+------------+--------------+
| Bilirubin,  | 0.5   | 0 - 1.2   | EXTERNAL   |              |
| Total,      |       |           | LAB        |              |
| External    |       |           |            |              |
+-------------+-------+-----------+------------+--------------+
 
 
 
+--------------------------+
| Resulting Agency Comment |
+--------------------------+
|   [facility]              |
+--------------------------+
 
 
 
+----------------+---------+--------------------+--------------+
| Performing     | Address | City/State/Zipcode | Phone Number |
| Organization   |         |                    |              |
+----------------+---------+--------------------+--------------+
|   EXTERNAL LAB |         |                    |              |
+----------------+---------+--------------------+--------------+
 External Lab: Albumin (2015)
 
+-----------+-------+-----------+------------+--------------+
| Component | Value | Ref Range | Performed  | Pathologist  |
|           |       |           | At         | Signature    |
+-----------+-------+-----------+------------+--------------+
| Albumin,  | 3.5   | 3.5 - 5   | EXTERNAL   |              |
 
| External  |       |           | LAB        |              |
+-----------+-------+-----------+------------+--------------+
 
 
 
+--------------------------+
| Resulting Agency Comment |
+--------------------------+
|   [facility]              |
+--------------------------+
 
 
 
+----------------+---------+--------------------+--------------+
| Performing     | Address | City/State/Zipcode | Phone Number |
| Organization   |         |                    |              |
+----------------+---------+--------------------+--------------+
|   EXTERNAL LAB |         |                    |              |
+----------------+---------+--------------------+--------------+
 External Lab: Protein, Total (2015)
 
+-----------+---------+-----------+------------+--------------+
| Component | Value   | Ref Range | Performed  | Pathologist  |
|           |         |           | At         | Signature    |
+-----------+---------+-----------+------------+--------------+
| Protein,  | 5.5 (A) | 6 - 8     | EXTERNAL   |              |
| Total,    |         |           | LAB        |              |
| External  |         |           |            |              |
+-----------+---------+-----------+------------+--------------+
 
 
 
+--------------------------+
| Resulting Agency Comment |
+--------------------------+
|   [facility]              |
+--------------------------+
 
 
 
+----------------+---------+--------------------+--------------+
| Performing     | Address | City/State/Zipcode | Phone Number |
| Organization   |         |                    |              |
+----------------+---------+--------------------+--------------+
|   EXTERNAL LAB |         |                    |              |
+----------------+---------+--------------------+--------------+
 External Lab: Magnesium (2015)
 
+-------------+-------+-----------+------------+--------------+
| Component   | Value | Ref Range | Performed  | Pathologist  |
|             |       |           | At         | Signature    |
+-------------+-------+-----------+------------+--------------+
| Magnesium,  | 1.7   | 1.7 - 2.5 | EXTERNAL   |              |
| External    |       |           | LAB        |              |
+-------------+-------+-----------+------------+--------------+
 
 
 
+--------------------------+
| Resulting Agency Comment |
 
+--------------------------+
|   [facility]              |
+--------------------------+
 
 
 
+----------------+---------+--------------------+--------------+
| Performing     | Address | City/State/Zipcode | Phone Number |
| Organization   |         |                    |              |
+----------------+---------+--------------------+--------------+
|   EXTERNAL LAB |         |                    |              |
+----------------+---------+--------------------+--------------+
 External Lab: Calcium (2015)
 
+-----------+-------+------------+------------+--------------+
| Component | Value | Ref Range  | Performed  | Pathologist  |
|           |       |            | At         | Signature    |
+-----------+-------+------------+------------+--------------+
| Calcium,  | 10.1  | 8.4 - 10.2 | EXTERNAL   |              |
| External  |       |            | LAB        |              |
+-----------+-------+------------+------------+--------------+
 
 
 
+--------------------------+
| Resulting Agency Comment |
+--------------------------+
|   [facility]              |
+--------------------------+
 
 
 
+----------------+---------+--------------------+--------------+
| Performing     | Address | City/State/Zipcode | Phone Number |
| Organization   |         |                    |              |
+----------------+---------+--------------------+--------------+
|   EXTERNAL LAB |         |                    |              |
+----------------+---------+--------------------+--------------+
 External Lab: Carbon Dioxide (2015)
 
+-----------+-------+-----------+------------+--------------+
| Component | Value | Ref Range | Performed  | Pathologist  |
|           |       |           | At         | Signature    |
+-----------+-------+-----------+------------+--------------+
| Carbon    | 20    | 19 - 31   | EXTERNAL   |              |
| Dioxide,  |       |           | LAB        |              |
| External  |       |           |            |              |
+-----------+-------+-----------+------------+--------------+
 
 
 
+--------------------------+
| Resulting Agency Comment |
+--------------------------+
|   [facility]              |
+--------------------------+
 
 
 
+----------------+---------+--------------------+--------------+
 
| Performing     | Address | City/State/Zipcode | Phone Number |
| Organization   |         |                    |              |
+----------------+---------+--------------------+--------------+
|   EXTERNAL LAB |         |                    |              |
+----------------+---------+--------------------+--------------+
 External Lab: Chloride (2015)
 
+------------+-------+-----------+------------+--------------+
| Component  | Value | Ref Range | Performed  | Pathologist  |
|            |       |           | At         | Signature    |
+------------+-------+-----------+------------+--------------+
| Chloride,  | 110   | 95 - 112  | EXTERNAL   |              |
| External   |       |           | LAB        |              |
+------------+-------+-----------+------------+--------------+
 
 
 
+--------------------------+
| Resulting Agency Comment |
+--------------------------+
|   [facility]              |
+--------------------------+
 
 
 
+----------------+---------+--------------------+--------------+
| Performing     | Address | City/State/Zipcode | Phone Number |
| Organization   |         |                    |              |
+----------------+---------+--------------------+--------------+
|   EXTERNAL LAB |         |                    |              |
+----------------+---------+--------------------+--------------+
 External Lab: Potassium (2015)
 
+-------------+---------+-----------+------------+--------------+
| Component   | Value   | Ref Range | Performed  | Pathologist  |
|             |         |           | At         | Signature    |
+-------------+---------+-----------+------------+--------------+
| Potassium,  | 5.3 (A) | 3.6 - 5.1 | EXTERNAL   |              |
| External    |         |           | LAB        |              |
+-------------+---------+-----------+------------+--------------+
 
 
 
+--------------------------+
| Resulting Agency Comment |
+--------------------------+
|   [facility]              |
+--------------------------+
 
 
 
+----------------+---------+--------------------+--------------+
| Performing     | Address | City/State/Zipcode | Phone Number |
| Organization   |         |                    |              |
+----------------+---------+--------------------+--------------+
|   EXTERNAL LAB |         |                    |              |
+----------------+---------+--------------------+--------------+
 External Lab: Sodium (2015)
 
+-----------+-------+-----------+------------+--------------+
 
| Component | Value | Ref Range | Performed  | Pathologist  |
|           |       |           | At         | Signature    |
+-----------+-------+-----------+------------+--------------+
| Sodium,   | 139   | 132 - 143 | EXTERNAL   |              |
| External  |       |           | LAB        |              |
+-----------+-------+-----------+------------+--------------+
 
 
 
+--------------------------+
| Resulting Agency Comment |
+--------------------------+
|   [facility]              |
+--------------------------+
 
 
 
+----------------+---------+--------------------+--------------+
| Performing     | Address | City/State/Zipcode | Phone Number |
| Organization   |         |                    |              |
+----------------+---------+--------------------+--------------+
|   EXTERNAL LAB |         |                    |              |
+----------------+---------+--------------------+--------------+
 External Lab: CBC (2015)
 
+-------------+---------+-----------+------------+--------------+
| Component   | Value   | Ref Range | Performed  | Pathologist  |
|             |         |           | At         | Signature    |
+-------------+---------+-----------+------------+--------------+
| WBC,        | 3.8 (A) | 4.5 - 11  | EXTERNAL   |              |
| External    |         |           | LAB        |              |
+-------------+---------+-----------+------------+--------------+
| HGB,        | 13.3    | 12 - 16   | EXTERNAL   |              |
| External    |         |           | LAB        |              |
+-------------+---------+-----------+------------+--------------+
| HCT,        | 39.9    | 35 - 45   | EXTERNAL   |              |
| External    |         |           | LAB        |              |
+-------------+---------+-----------+------------+--------------+
| PLT,        | 155     | 140 - 440 | EXTERNAL   |              |
| External    |         |           | LAB        |              |
+-------------+---------+-----------+------------+--------------+
| Neutrophils | 53.9    | 39 - 80   | EXTERNAL   |              |
|   %,        |         |           | LAB        |              |
| External    |         |           |            |              |
+-------------+---------+-----------+------------+--------------+
| Lymphocytes | 32.8    | 24 - 44   | EXTERNAL   |              |
|  %,         |         |           | LAB        |              |
| External    |         |           |            |              |
+-------------+---------+-----------+------------+--------------+
| Monocytes   | 10.5    | 0 - 12    | EXTERNAL   |              |
| %, External |         |           | LAB        |              |
+-------------+---------+-----------+------------+--------------+
| Eosinophils | 2.2     | 0 - 6     | EXTERNAL   |              |
|  %,         |         |           | LAB        |              |
| External    |         |           |            |              |
+-------------+---------+-----------+------------+--------------+
| RBC,        | 3.86    | 3.8 - 5.1 | EXTERNAL   |              |
| External    |         |           | LAB        |              |
+-------------+---------+-----------+------------+--------------+
| MCV,        | 104 (A) | 81 - 99   | EXTERNAL   |              |
 
| External    |         |           | LAB        |              |
+-------------+---------+-----------+------------+--------------+
| RDW,        | 14.4    | 10.5 - 15 | EXTERNAL   |              |
| External    |         |           | LAB        |              |
+-------------+---------+-----------+------------+--------------+
 
 
 
+--------------------------+
| Resulting Agency Comment |
+--------------------------+
|   [facility]              |
+--------------------------+
 
 
 
+----------------+---------+--------------------+--------------+
| Performing     | Address | City/State/Zipcode | Phone Number |
| Organization   |         |                    |              |
+----------------+---------+--------------------+--------------+
|   EXTERNAL LAB |         |                    |              |
+----------------+---------+--------------------+--------------+
 External Lab: Triglycerides (2015)
 
+-------------+---------+-----------+------------+--------------+
| Component   | Value   | Ref Range | Performed  | Pathologist  |
|             |         |           | At         | Signature    |
+-------------+---------+-----------+------------+--------------+
| Triglycerid | 194 (A) | 30 - 150  | EXTERNAL   |              |
| es,         |         |           | LAB        |              |
| External    |         |           |            |              |
+-------------+---------+-----------+------------+--------------+
 
 
 
+-----------------+
| Specimen        |
+-----------------+
| Blood specimen  |
| (specimen)      |
+-----------------+
 
 
 
+--------------------------+
| Resulting Agency Comment |
+--------------------------+
|   [facility]              |
+--------------------------+
 
 
 
+----------------+---------+--------------------+--------------+
| Performing     | Address | City/State/Zipcode | Phone Number |
| Organization   |         |                    |              |
+----------------+---------+--------------------+--------------+
|   EXTERNAL LAB |         |                    |              |
+----------------+---------+--------------------+--------------+
 External Lab: Cholesterol, HDL (2015)
 
 
+-------------+-------+-----------+------------+--------------+
| Component   | Value | Ref Range | Performed  | Pathologist  |
|             |       |           | At         | Signature    |
+-------------+-------+-----------+------------+--------------+
| HDL         | 47.9  | 40 mg/dl  | EXTERNAL   |              |
| Cholesterol |       |           | LAB        |              |
| , External  |       |           |            |              |
+-------------+-------+-----------+------------+--------------+
 
 
 
+-----------------+
| Specimen        |
+-----------------+
| Blood specimen  |
| (specimen)      |
+-----------------+
 
 
 
+--------------------------+
| Resulting Agency Comment |
+--------------------------+
|   [facility]              |
+--------------------------+
 
 
 
+----------------+---------+--------------------+--------------+
| Performing     | Address | City/State/Zipcode | Phone Number |
| Organization   |         |                    |              |
+----------------+---------+--------------------+--------------+
|   EXTERNAL LAB |         |                    |              |
+----------------+---------+--------------------+--------------+
 External Lab: Cholesterol, Total (2015)
 
+-------------+-------+-----------+------------+--------------+
| Component   | Value | Ref Range | Performed  | Pathologist  |
|             |       |           | At         | Signature    |
+-------------+-------+-----------+------------+--------------+
| Cholesterol | 156   | 200 mg/dl | EXTERNAL   |              |
| , Total,    |       |           | LAB        |              |
| External    |       |           |            |              |
+-------------+-------+-----------+------------+--------------+
 
 
 
+-----------------+
| Specimen        |
+-----------------+
| Blood specimen  |
| (specimen)      |
+-----------------+
 
 
 
+--------------------------+
| Resulting Agency Comment |
+--------------------------+
|   [facility]              |
 
+--------------------------+
 
 
 
+----------------+---------+--------------------+--------------+
| Performing     | Address | City/State/Zipcode | Phone Number |
| Organization   |         |                    |              |
+----------------+---------+--------------------+--------------+
|   EXTERNAL LAB |         |                    |              |
+----------------+---------+--------------------+--------------+
 External Lab: Cholesterol, LDL (2015)
 
+-------------+-------+-----------+------------+--------------+
| Component   | Value | Ref Range | Performed  | Pathologist  |
|             |       |           | At         | Signature    |
+-------------+-------+-----------+------------+--------------+
| LDL         | 69    | 100       | EXTERNAL   |              |
| Cholesterol |       |           | LAB        |              |
| , Direct,   |       |           |            |              |
| External    |       |           |            |              |
+-------------+-------+-----------+------------+--------------+
 
 
 
+-----------------+
| Specimen        |
+-----------------+
| Blood specimen  |
| (specimen)      |
+-----------------+
 
 
 
+--------------------------+
| Resulting Agency Comment |
+--------------------------+
|   [facility]              |
+--------------------------+
 
 
 
+----------------+---------+--------------------+--------------+
| Performing     | Address | City/State/Zipcode | Phone Number |
| Organization   |         |                    |              |
+----------------+---------+--------------------+--------------+
|   EXTERNAL LAB |         |                    |              |
+----------------+---------+--------------------+--------------+
 External Lab: eGFR (2015)
 
+-----------+-------+-----------+------------+--------------+
| Component | Value | Ref Range | Performed  | Pathologist  |
|           |       |           | At         | Signature    |
+-----------+-------+-----------+------------+--------------+
| eGFR,     | 44    |           | EXTERNAL   |              |
| External  |       |           | LAB        |              |
+-----------+-------+-----------+------------+--------------+
 
 
 
+-----------------+
 
| Specimen        |
+-----------------+
| Blood specimen  |
| (specimen)      |
+-----------------+
 
 
 
+--------------------------+
| Resulting Agency Comment |
+--------------------------+
|   [facility]              |
+--------------------------+
 
 
 
+----------------+---------+--------------------+--------------+
| Performing     | Address | City/State/Zipcode | Phone Number |
| Organization   |         |                    |              |
+----------------+---------+--------------------+--------------+
|   EXTERNAL LAB |         |                    |              |
+----------------+---------+--------------------+--------------+
 External Lab: Creatinine (2015)
 
+-------------+-------+------------+------------+--------------+
| Component   | Value | Ref Range  | Performed  | Pathologist  |
|             |       |            | At         | Signature    |
+-------------+-------+------------+------------+--------------+
| Creatinine, | 1.21  | 0.7 - 1.25 | EXTERNAL   |              |
|  External   |       |            | LAB        |              |
+-------------+-------+------------+------------+--------------+
 
 
 
+-----------------+
| Specimen        |
+-----------------+
| Blood specimen  |
| (specimen)      |
+-----------------+
 
 
 
+--------------------------+
| Resulting Agency Comment |
+--------------------------+
|   [facility]              |
+--------------------------+
 
 
 
+----------------+---------+--------------------+--------------+
| Performing     | Address | City/State/Zipcode | Phone Number |
| Organization   |         |                    |              |
+----------------+---------+--------------------+--------------+
|   EXTERNAL LAB |         |                    |              |
+----------------+---------+--------------------+--------------+
 documented in this encounter
 
 Visit Diagnoses
 
 Not on filedocumented in this encounter

## 2020-03-29 NOTE — XMS
Encounter Summary
  Created on: 2020
 
 Viviana Ricci
 External Reference #: [id number]
 : 47
 Sex: Female
 
 Demographics
 
 
+-----------------------+----------------------+
| Address               | [address]      |
|                       | [place]  [address] |
+-----------------------+----------------------+
| Home Phone            | [phone]      |
+-----------------------+----------------------+
| Preferred Language    | Unknown              |
+-----------------------+----------------------+
| Marital Status        | Single               |
+-----------------------+----------------------+
| [employer] Affiliation | [id number]                 |
+-----------------------+----------------------+
| Race                  | Unknown              |
+-----------------------+----------------------+
| Ethnic Group          | Unknown              |
+-----------------------+----------------------+
 
 
 Author
 
 
+--------------+--------------------------------------------+
| Author       | [facility]  |
|              | and [facility]                                |
+--------------+--------------------------------------------+
| Organization | [facility]  |
|              | and [facility]                                |
+--------------+--------------------------------------------+
| Address      | Unknown                                    |
+--------------+--------------------------------------------+
| Phone        | Unavailable                                |
+--------------+--------------------------------------------+
 
 
 
 Support
 
 
+----------------+--------------+---------------------+-----------------+
| Name           | Relationship | Address             | Phone           |
+----------------+--------------+---------------------+-----------------+
| Ada/Ed Radhames | ECON         | [address]              | [phone] |
|                |              | [place]  |                 |
|                |              |  [id number]              |                 |
+----------------+--------------+---------------------+-----------------+
 
 
 
 
 Care Team Providers
 
 
+-----------------------+------+-------------+
| Care Team Member Name | Role | Phone       |
+-----------------------+------+-------------+
 PCP  | Unavailable |
+-----------------------+------+-------------+
 
 
 
 Reason for Visit
 
 
+-------------------+----------+
| Reason            | Comments |
+-------------------+----------+
| Medication Refill |          |
+-------------------+----------+
 
 
 
 Encounter Details
 
 
+--------+--------+---------------------+----------------------+-------------------+
| Date   | Type   | Department          | Care Team            | Description       |
+--------+--------+---------------------+----------------------+-------------------+
| / | Refill |   [facility]         |   Marzena Moser  | Medication Refill |
|    |        | NEPHROLOGY  [address]   | M, DO  [address]      |                   |
|        |        | [address]   | [address]      |                   |
|        |        | [place], [place]     | [place], [place]      |                   |
|        |        | [address]          | [address]  [phone]  |                   |
|        |        | [phone]        |  [phone] (Fax)  |                   |
+--------+--------+---------------------+----------------------+-------------------+
 
 
 
 Social History
 
 
+--------------+-------+-----------+--------+------+
| Tobacco Use  | Types | Packs/Day | Years  | Date |
|              |       |           | Used   |      |
+--------------+-------+-----------+--------+------+
| Never Smoker |       |           |        |      |
+--------------+-------+-----------+--------+------+
 
 
 
+---------------------+---+---+---+
| Smokeless Tobacco:  |   |   |   |
| Never Used          |   |   |   |
+---------------------+---+---+---+
 
 
 
+---------------------------------------------------------------+
| Comments: some second hand smoke exposure, but fairly minimal |
 
+---------------------------------------------------------------+
 
 
 
+-------------+-------------+---------+----------+
| Alcohol Use | Drinks/Week | oz/Week | Comments |
+-------------+-------------+---------+----------+
| No          |             |         |          |
+-------------+-------------+---------+----------+
 
 
 
+------------------+---------------+
| Sex Assigned at  | Date Recorded |
| Birth            |               |
+------------------+---------------+
| Not on file      |               |
+------------------+---------------+
 
 
 
+----------------+-------------+-------------+
| Job Start Date | Occupation  | Industry    |
+----------------+-------------+-------------+
| Not on file    | Not on file | Not on file |
+----------------+-------------+-------------+
 
 
 
+----------------+--------------+------------+
| Travel History | Travel Start | Travel End |
+----------------+--------------+------------+
 
 
 
+-------------------------------------+
| No recent travel history available. |
+-------------------------------------+
 documented as of this encounter
 
 Plan of Treatment
 
 
+--------+-----------+------------+----------------------+-------------------+
| Date   | Type      | Specialty  | Care Team            | Description       |
+--------+-----------+------------+----------------------+-------------------+
| / | Office    | Cardiology |   HellalfredoTonia,    |                   |
|    | Visit     |            | ARNP  [address]   |                   |
|        |           |            | St  [place], [place]  |                   |
|        |           |            | [address]  [phone]  |                   |
|        |           |            |  [phone] (Fax)  |                   |
+--------+-----------+------------+----------------------+-------------------+
| / | Hospital  | Radiology  |   Popeye Townsend, |                   |
|    | Encounter |            |  MD  [address] |                   |
|        |           |            |  St.  [place],   |                   |
|        |           |            | [place] [address]             |                   |
|        |           |            | [phone]         |                   |
|        |           |            | [phone] (Fax)   |                   |
+--------+-----------+------------+----------------------+-------------------+
| / | Surgery   | Radiology  |   Popeye Townsend, | CV EP PPM SYSTEM  |
 
|    |           |            |  MD  [address] | IMPLANT           |
|        |           |            |  St.  [place],   |                   |
|        |           |            | [place] [address]             |                   |
|        |           |            | [phone]         |                   |
|        |           |            | [phone] (Fax)   |                   |
+--------+-----------+------------+----------------------+-------------------+
| 02/10/ | Clinical  | Cardiology |                      |                   |
|    | Support   |            |                      |                   |
+--------+-----------+------------+----------------------+-------------------+
| / | Office    | Cardiology |   Tonia Wilson,    |                   |
|    | Visit     |            | ARNP  [address]   |                   |
|        |           |            | [place]  |                   |
|        |           |            | [address]  [phone]  |                   |
|        |           |            |  [phone] (Fax)  |                   |
+--------+-----------+------------+----------------------+-------------------+
| / | Off-Site  | Nephrology |   Marzena Moser  |                   |
|    | Visit     |            | DO ETIENNE  [address] [place] [address]      |                   |
|        |           |            | [address]      |                   |
|        |           |            | [place]      |                   |
|        |           |            | [address]  [phone]  |                   |
|        |           |            |  [phone] (Fax)  |                   |
+--------+-----------+------------+----------------------+-------------------+
 documented as of this encounter
 
 Visit Diagnoses
 
 
+------------------------------------------------------------------------------------------+
| Diagnosis                                                                                |
+------------------------------------------------------------------------------------------+
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
| stage IV, or unspecified(403.90) - Primary  Unspecified hypertensive kidney disease with |
|  chronic kidney disease stage I through stage IV, or unspecified                         |
+------------------------------------------------------------------------------------------+
|   Complications of transplanted kidney                                                   |
+------------------------------------------------------------------------------------------+
|   DIABETES MELLITUS, TYPE II, UNCONTROLLED  Type II or unspecified type diabetes         |
| mellitus without mention of complication, uncontrolled                                   |
+------------------------------------------------------------------------------------------+
|   Hyperlipidemia  Other and unspecified hyperlipidemia                                   |
+------------------------------------------------------------------------------------------+
 documented in this encounter Start antibiotic  Take probiotic  Start ear drops as prescribed  Tylenol Motrin as needed for discomfort  You can apply warm compresses to the ear  Follow up with PCP if no improvement  Go to the ER with any worsening symptoms, increased swelling of the external ear, fevers, redness or streaking

## 2022-08-30 NOTE — XMS
Encounter Summary
  Created on: 2020
 
 Viviana Ricci
 External Reference #: [id number]
 : 47
 Sex: Female
 
 Demographics
 
 
+-----------------------+----------------------+
| Address               | [address]      |
|                       | [place]  [address] |
+-----------------------+----------------------+
| Home Phone            | [phone]      |
+-----------------------+----------------------+
| Preferred Language    | Unknown              |
+-----------------------+----------------------+
| Marital Status        | Single               |
+-----------------------+----------------------+
| Christian Affiliation | [id number]                 |
+-----------------------+----------------------+
| Race                  | Unknown              |
+-----------------------+----------------------+
| Ethnic Group          | Unknown              |
+-----------------------+----------------------+
 
 
 Author
 
 
+--------------+--------------------------------------------+
| Author       | [facility]  |
|              | and [place]                                |
+--------------+--------------------------------------------+
| Organization | [facility]  |
|              | and [place]                                |
+--------------+--------------------------------------------+
| Address      | Unknown                                    |
+--------------+--------------------------------------------+
| Phone        | Unavailable                                |
+--------------+--------------------------------------------+
 
 
 
 Support
 
 
+----------------+--------------+---------------------+-----------------+
| Name           | Relationship | Address             | Phone           |
+----------------+--------------+---------------------+-----------------+
| Ada/Ed Radhames | ECON         | [address]              | [phone] |
|                |              | [place]  |                 |
|                |              |  [id number]              |                 |
+----------------+--------------+---------------------+-----------------+
 
 
 
 
 Care Team Providers
 
 
+-----------------------+------+-------------+
| Care Team Member Name | Role | Phone       |
+-----------------------+------+-------------+
 PCP  | Unavailable |
+-----------------------+------+-------------+
 
 
 
 Reason for Visit
 
 
+--------+----------+
| Reason | Comments |
+--------+----------+
| Cough  |          |
+--------+----------+
 
 
 
 Encounter Details
 
 
+--------+---------+----------------------+----------------+----------------------+
| Date   | Type    | Department           | Care Team      | Description          |
+--------+---------+----------------------+----------------+----------------------+
| / | Office  |   [facility] [place]          |   Off[place],  | Dyspnea (Primary     |
|    | Visit   | PULMONARY  [address]     | Nena GUSMAN MD  | Dx); Cough; Allergic |
|        |         | [place]  [place], |                |  rhinitis; Pulmonary |
|        |         |  [place] [address]       |                |  hypertension (HCC); |
|        |         | [phone]         |                |  Hypervolemia;       |
|        |         |                      |                | Obesity              |
|        |         |                      |                | hypoventilation      |
|        |         |                      |                | syndrome (HCC); JACK  |
|        |         |                      |                | on CPAP;             |
|        |         |                      |                | Hypothyroidism       |
+--------+---------+----------------------+----------------+----------------------+
 
 
 
 Social History
 
 
+--------------+-------+-----------+--------+------+
| Tobacco Use  | Types | Packs/Day | Years  | Date |
|              |       |           | Used   |      |
+--------------+-------+-----------+--------+------+
| Never Smoker |       |           |        |      |
+--------------+-------+-----------+--------+------+
 
 
 
+---------------------+---+---+---+
| Smokeless Tobacco:  |   |   |   |
| Never Used          |   |   |   |
+---------------------+---+---+---+
 
 
 
 
+---------------------------------------------------------------+
| Comments: some second hand smoke exposure, but fairly minimal |
+---------------------------------------------------------------+
 
 
 
+-------------+-------------+---------+----------+
| Alcohol Use | Drinks/Week | oz/Week | Comments |
+-------------+-------------+---------+----------+
| No          |             |         |          |
+-------------+-------------+---------+----------+
 
 
 
+------------------+---------------+
| Sex Assigned at  | Date Recorded |
| Birth            |               |
+------------------+---------------+
| Not on file      |               |
+------------------+---------------+
 
 
 
+----------------+-------------+-------------+
| Job Start Date | Occupation  | Industry    |
+----------------+-------------+-------------+
| Not on file    | Not on file | Not on file |
+----------------+-------------+-------------+
 
 
 
+----------------+--------------+------------+
| Travel History | Travel Start | Travel End |
+----------------+--------------+------------+
 
 
 
+-------------------------------------+
| No recent travel history available. |
+-------------------------------------+
 documented as of this encounter
 
 Last Filed Vital Signs
 
 
+-------------------+-------------------+----------------------+-----------------+
| Vital Sign        | Reading           | Time Taken           | Comments        |
+-------------------+-------------------+----------------------+-----------------+
| Blood Pressure    | 110/70            | 2013 10:22 AM  |                 |
|                   |                   | PDT                  |                 |
+-------------------+-------------------+----------------------+-----------------+
| Pulse             | 79                | 2013 10:22 AM  |                 |
|                   |                   | PDT                  |                 |
+-------------------+-------------------+----------------------+-----------------+
| Temperature       | -                 | -                    |                 |
+-------------------+-------------------+----------------------+-----------------+
| Respiratory Rate  | -                 | -                    |                 |
+-------------------+-------------------+----------------------+-----------------+
 
| Oxygen Saturation | 98%               | 2013 10:22 AM  | O2 ranged from  |
|                   |                   | PDT                  | 95-98%          |
+-------------------+-------------------+----------------------+-----------------+
| Inhaled Oxygen    | -                 | -                    |                 |
| Concentration     |                   |                      |                 |
+-------------------+-------------------+----------------------+-----------------+
| Weight            | 134.3 kg (296 lb) | 2013 10:22 AM  |                 |
|                   |                   | PDT                  |                 |
+-------------------+-------------------+----------------------+-----------------+
| Height            | 165.1 cm (5' 5")  | 2013 10:22 AM  |                 |
|                   |                   | PDT                  |                 |
+-------------------+-------------------+----------------------+-----------------+
| Body Mass Index   | 49.26             | 2013 10:22 AM  |                 |
|                   |                   | PDT                  |                 |
+-------------------+-------------------+----------------------+-----------------+
 documented in this encounter
 
 Patient Instructions
 Patient Instructions Nena Boykin MD - 2013 11:13 AM PDTStart inhaler and n
keren spray. Rinse mouth after using inhaler. 
 
 Take CPAP in to have pressure changed. Electronically signed by Nena Boykin MD at
 2013  6:27 AM PDT
 documented in this encounter
 
 Progress Notes
 Nena Boykin MD - 2013 10:43 AM PDTFormatting of this note might be differe
nt from the original.
 Pulmonary Follow Up
 
 HPI 
  
 Viviana Ricci is a 67 y.o. female patient of Marzena Moser here today for follow up of 
cough and shortness of breath.
 
 She saw Dr. Moser and he put her on prednisone. She notes that she felt much better on i
t, she had no cough and wheezing. She stopped the prednisone about 2 weeks ago (is back to h
er baseline 5mg a day that she takes for her transplant). She notes the shortness of breath 
did not get better on the prednisone, but the wheezing and cough did. She thinks the shortne
ss of breath may be due to a different process than the wheezing and cough. 
 
 She wonders if she has allergies as she had some stuff in her throat and was clearing throa
t and was bringing up some mucous.  She does not notice nasal congestion, but does notice dr do. She has noticed some wheezing again, at night. 
 
 She had her methacholine today, and it was negative. We discussed starting her on treatment
 for asthma anyway, and she is agreeable. She is using the oxygen with her CPAP, and her netta
nload looked good, except that her compliance was poor. 
 
 She had an echocardiogram done, without major abnormalities. They could not assess her pulm
onary artery pressures. 
 
 Her last stress test was prior to .
 
 Past Medical History
 Past Medical History 
 Diagnosis Date 
   Kidney replaced by transplant  
   Complication of transplanted kidney  
   Coronary artery disease  
 
   s/p CABG , cardiac cath in  
   Pulmonary hypertension  
   thought secondary to nocturnal hypoxemia 
   Obesity hypoventilation syndrome  
   JACK on CPAP  
   Diabetes mellitus, type 2  
   Secondary hyperparathyroidism  
   Hypothyroidism  
   Hyperlipidemia  
   Chronic low back pain  
   Movement disorder  
   tremor of unclear etiology 
   DJD (degenerative joint disease)  
   s/p knee replacement 
   Humerus fracture 2003 
   right supracondylar distal humerus fracture 
   Carpal tunnel syndrome  
   Anemia in chronic renal disease  
   resolved after transplant 
   Infection with CMV (cytomegalovirus)  
   complicating kidney transplant 
   FSGS (focal segmental glomerulosclerosis)  
   ESRD (end stage renal disease)  
  
  
 Past Surgical History
 Past Surgical History 
 Procedure Date 
   Cholecystectomy  
   Insert peritoneal catheter 1998 
   x 2 
   Kidney transplant  
   Total knee arthroplasty  
   Right 
   Hysterectomy 2003 
   Abdominal exploration surgery 2006 
   Parathyroidectomy 2007 
   Carpal tunnel release  
   Coronary artery bypass graft 1998 
   5v 
  
 
 Social History: 
 History 
 
 Social History 
   Marital Status: Single 
   Spouse Name: N/A 
   Number of Children: N/A 
   Years of Education: N/A 
 
 Occupational History 
   .  Disabled 
 
 Social History Main Topics 
   Smoking status: Never Smoker  
   Smokeless tobacco: Never Used 
  Comment: some second hand smoke exposure, but fairly minimal 
   Alcohol Use: No 
   Drug Use: No 
 
   Sexually Active: None 
 
 Other Topics Concern 
   None 
 
 Social History Narrative 
  Lives in [place] alone. Has a dog at home. No other animal exposures. Had birds as a chi
ld. Grew up in [place], [place]. 
 
 Allergies:
 No Known Allergies  
 
 Medications:
 Outpatient Encounter Prescriptions as of 2013 
 Medication Sig Dispense Refill 
   albuterol (PROAIR HFA) 90 mcg/puff inhaler Inhale 2 puffs into the lungs every 6 hours 
as needed for Wheezing.  1 Inhaler  2 
   allopurinol (ZYLOPRIM) 100 mg tablet Take 100 mg by mouth Daily.     
   aspirin 81 MG EC tablet Take 81 mg by mouth Daily.     
   Cholecalciferol (VITAMIN D3) 5000 UNITS CAPS Take 5,000 Units by mouth Once a week.    
 
   cinacalcet (SENSIPAR) 30 mg tablet Take 1 tablet by mouth Daily.  30 tablet  12 
   fludrocortisone (FLORINEF) 0.1 mg tablet Take 1 tablet by mouth Every other day.  30 ta
blet  11 
   furosemide (LASIX) 40 mg tablet Take two tablets by mouth daily.  60 tablet  5 
   glucose blood VI test strips (ONE TOUCH ULTRA TEST) strip Check blood sugar before each
 meal and as directed  100 each  12 
   insulin glargine (LANTUS) 100 units/mL injection Inject 20 Units under the skin every m
orning.       
   insulin lispro (HUMALOG) 100 units/mL injection Inject subcutaneously before meals acco
rding to sliding scale  10 pen  5 
   Insulin Syringe-Needle U-100 (BD INSULIN SYRINGE ULTRAFINE) 31G X 5/16" 0.5 ML MISC Use
 before meals and as directed.  100 each  11 
   levothyroxine (LEVOTHROID) 50 mcg tablet Take 1 tablet by mouth Daily.  30 tablet  11 
   lisinopril (PRINIVIL,ZESTRIL) 30 MG tablet Take 1 tablet by mouth Daily.  90 tablet  3 
   loperamide (ANTI-DIARRHEAL) 2 mg capsule Take 2 mg by mouth Daily as needed.     
   magnesium oxide (MAG-OX) 400 mg tablet Take 1 tablet by mouth 2 times daily.  62 tablet
  12 
   metoprolol tartrate (LOPRESSOR) 25 mg tablet Take 1 tablet by mouth 2 times daily.  60 
tablet  11 
   mycophenolate (CELLCEPT) 250 mg capsule Take 250 mg by mouth 3 times daily.     
   omeprazole (PRILOSEC) 20 mg capsule Take one capsule by mouth once daily on an empty st
omach     
   predniSONE (DELTASONE) 10 mg tablet Take  by mouth See Admin Instructions. Take 40 mg b
y mouth daily for five days, then decrease to 30 mg for two days, then decrease to 20 mg for
 two days, then decrease to 10 mg for two days, then discontinue and resume daily dose of 5 
mg daily.     
   predniSONE (DELTASONE) 5 mg tablet Take 1 tablet by mouth Daily.  90 tablet  3 
   Prenatal Multivit-Min-Fe-FA (PRENATAL VITAMINS) 0.8 MG TABS Take 0.8 mg by mouth Daily.
  30 each  11 
   rosuvastatin (CRESTOR) 40 MG tablet Take 40 mg by mouth nightly.       
   tacrolimus (PROGRAF) 0.5 mg capsule Take 0.5 mg by mouth 2 times daily.     
   tacrolimus (PROGRAF) 1 mg capsule Take 1 capsule by mouth 2 times daily.  62 capsule  1
2 
   valsartan (DIOVAN) 160 mg tablet Take 1 tablet by mouth Daily.  30 tablet  11 
 
 Review of Systems
 Constitutional:  Denies fever, chills, sweats, and change in weight. 
 Sleep:  Using CPAP without issue. Started O2 bleed in.  
 GI:  No heartburn or reflux.  
 
 ENT:  See HPI. 
 Resp:   See HPI.  
 CV:  Denies chest pain, palpitations, syncope, and peripheral edema. 
 Neuro:  Has had some vertigo when making her bed. She describes this as the room spinning, 
she takes a break and this improves.  
 
 Objective 
 
 /70 | Pulse 79 | Ht 1.651 m (5' 5") | Wt 134.265 kg (296 lb) | BMI 49.26 kg/m2 | SpO2
 98%RA
 
 General Appearance:  Alert, cooperative, no distress, appears stated age, having frequent t
remors 
 Mouth: No oral lesions or exudate 
 Neck: Supple, symmetrical, no adenopathy 
 Lungs:   No accessory muscle use, breath sounds are slightly diminished bilaterally, no whe
ezes, crackles or rhonchi 
 Chest Wall:  No deformity 
 Heart:  Regular rate and rhythm, no murmur, rub or gallop 
 Abdomen:   Soft, non-tender, non-distended, obese 
 Extremities:  No cyanosis, clubbing, some pitting edema of bilateral lower extremities 
 
 Data:
 Methacholine challenge test was performed prior to clinic today and was reviewed and interp
reted in clinic today.  It was negative.
 
 Dates: 3/26/13-13
 Machine type: Managed Objects
 Home Health Company: [facility]
 CPAP Pressure: 14-20 cmH2O
 Median Titrated Pressure: 14.2 cmH2O
 95%tile Pressure: 15.4 cmH2O
 Maximum Pressure: 16.0 cmH2O
 AHI: 2.1 events/hour
 Total number of  days: 30 
 Number of days used: 20
 Median daily usage: 4:01 hours
 Percent of days used for more than 4 hours: 33 %
 Median leak: 9.0 L/min
 
 Echocardiogram was done on 2013 and showed good LV function, but some evidence of
 elevated left heart filling pressures. They could not assess pulmonary pressures.
 
 Overnight oximetry was done on May 17, 2013 and did show that she desaturated on her CPAP a
lone, and a 2LPM bleed in was added. 
 
 Chest x-ray done May 17, 2013 was reviewed in clinic today. It shows cardiomegaly, previous
 sternotomy, prominent pulmonary vessels and no acute findings. 
 
  Ref. Range 2013 10:01 
 BNP Latest Range: <100 pg/mL 164 (H) 
 
 Immunization History 
 Administered Date(s) Administered 
   INFLUENZA, PRESERVATIVE FREE IM 2012 
 
 Assessment  
 
 1. Dyspnea - Etiology is not readily apparent, and may have more than one etiology. My susp
icion is for a combination of worsening pulmonary hypertension from low oxygen at night from
 
 lack of CPAP usage and from progressive nocturnal hypoxemia. This however, could not be wel
l assessed on echo due to body habitus. We have added a oxygen bleed in and I have encourage
d CPAP usage. She also has signs of hypervolemia on eco, likely from kidney transplant dysfu
nction, as her LV function appeared fairly good. I also think obesity and deconditioning saul
ys a role here, and this has worsened with progressive fatigue off her CPAP. In addition, th
ere is the question of asthma. Her methacholine is negative. There are some patients who hakeem
l not have a positive methacholine, so we will trial a course of inhaled steroids.
 She is not sure that her dyspnea improved with steroids and does not feel it responds to al
buterol. The wheezing and cough were better on prednisone. This makes me think she has more 
than one problem as cause for her symptoms. 
 Other considerations include cardiac disease, pulmonary parenchymal disease. We will need t
o consider a repeat stress test or chest CT if no ongoing improvement.  
 2. Cough - I do suspect that this is at least in part allergic rhinitis. The question is if
 there is also a component of asthma. We will treat for both.  
 3. Allergic rhinitis - I will treat for both with nasal steroids. We can then try adding an
 antihistamine.  
 4. Pulmonary hypertension - She has documented pulmonary hypertension. Repeat echo was unab
le to assess current status. Theoretically it ought to be better, but given that she has had
 poor CPAP compliance and is more hypervolemic, it could very well be worse. Dr. Pham previo
usly did a fairly extensive work up, only lacking vasodilator trial (which he had wanted), a
nd possibly autoimmune serologies (which I did not see in his notes), but it appeared fairly
 conclusively to be from low oxygen at night at the time. If further evaluation is needed to
 see her current status, we will have to do a right heart catheterization for evaluation.  
 5. Hypervolemia - She has evidence of hypervolemia on echocardiogram. Given her transplant 
status I will defer management to her nephrologist. Her BNP has been only slightly elevated,
 which is interesting. I do think this is playing some role in her dyspnea, but obviously th
e data is somewhat conflicting and somewhat difficult to interpret.  
 6. Obesity hypoventilation syndrome - We have added an oxygen bleed in to her CPAP. Given h
er symptoms of dizziness on exertion, I will also check an ambulating oximetry today.  
 7. JACK on CPAP - She has had poor compliance, which will lead to fatigue, increasing pulmon
manisha pressures, and thus increased dyspnea. I am not certain how much of a role this is playi
ng in her symptoms, but I suspect at least some. I have encouraged good usage. If further do
wnloads do not show improved compliance, then we will refer to Yaakov Pepper for follow up.  
 8. Hypothyroidism - Elevation in TSH could result in fatigue, thus we will check a TSH if t
here has not been one done recently at [facility].  
 
 Plan 
 1.CPAP compliance encouraged.
 2.Start flunisolide nasal spray 2 sprays daily.
 3.Start Flovent 220mcg 1 puff inhaled twice a day.
 4.2 liter bleed in added to CPAP.
 5.I am dropping her CPAP pressure to [id number] based on her download.
 6.Ambulating oximetry today.
 7.Consider a chest CT and stress test.
 8.Consider a repeat right heart catheterization to assess her pulmonary pressures. 
 9.I leave management of her volume status to her nephrologist.
 
 30 minutes were spent with Ms. Ricci with greater than 50% spent in counseling and coordina
tion of care.
 
 She was advised to call if new pulmonary symptoms were to develop.
 
 Return to clinic in 6 weeks, or sooner with concerns.
 
 CC: Marzena Msoer DO
 
 Portions of this report were transcribed using voice recognition software.  Every effort wa
s made to ensure accuracy; however, inadvertent computerized transcription errors may be pre
sent.
 
 
 Electronically signed by Nena Boykin MD at 2013  6:29 AM PDTdocumented in t
his encounter
 
 Plan of Treatment
 
 
+--------+-----------+------------+----------------------+-------------------+
| Date   | Type      | Specialty  | Care Team            | Description       |
+--------+-----------+------------+----------------------+-------------------+
| / | Office    | Cardiology |   Tonia Wilson,    |                   |
|    | Visit     |            | ARN  [address]   |                   |
|        |           |            | [place], [place]  |                   |
|        |           |            | [address] [phone]  |                   |
|        |           |            |  [phone] (Fax)  |                   |
+--------+-----------+------------+----------------------+-------------------+
| / | Hospital  | Radiology  |   Popeye Townsend, |                   |
|    | Encounter |            |  MD  [address] |                   |
|        |           |            |  St.  [place],   |                   |
|        |           |            | [place] [address]             |                   |
|        |           |            | [phone]         |                   |
|        |           |            | [phone] (Fax)   |                   |
+--------+-----------+------------+----------------------+-------------------+
| / | Surgery   | Radiology  |   Popeye Townsend, | CV EP PPM SYSTEM  |
|    |           |            |  MD  [address] | IMPLANT           |
|        |           |            |  St.  [place],   |                   |
|        |           |            | [place] [address]             |                   |
|        |           |            | [phone]         |                   |
|        |           |            | [phone] (Fax)   |                   |
+--------+-----------+------------+----------------------+-------------------+
| 02/10/ | Clinical  | Cardiology |                      |                   |
|    | Support   |            |                      |                   |
+--------+-----------+------------+----------------------+-------------------+
| / | Office    | Cardiology |   Tonia Wilson,    |                   |
|    | Visit     |            | ARNP  [address]   |                   |
|        |           |            | St  [place], [place]  |                   |
|        |           |            | [address]  [phone]  |                   |
|        |           |            |  [phone] (Fax)  |                   |
+--------+-----------+------------+----------------------+-------------------+
| / | Off-Site  | Nephrology |   Marzena Moser  |                   |
|    | Visit     |            | M,   [address]      |                   |
|        |           |            | [address]      |                   |
|        |           |            | [place]      |                   |
|        |           |            | [address]  [phone]  |                   |
|        |           |            |  [phone] (Fax)  |                   |
+--------+-----------+------------+----------------------+-------------------+
 
 
 
+-----------------+-------------+--------+----------------------+----------------------+
| Name            | Type        | Priori | Associated Diagnoses | Order Schedule       |
|                 |             | ty     |                      |                      |
+-----------------+-------------+--------+----------------------+----------------------+
| Pulse oximetry  | Respiratory | Routin |   Dyspnea            | Expected:            |
| titation        |  Care       | e      |                      | 2013, Expires: |
|                 |             |        |                      |  2014          |
+-----------------+-------------+--------+----------------------+----------------------+
 documented as of this encounter
 
 Visit Diagnoses
 
 
 
+----------------------------------------------------------------------------+
| Diagnosis                                                                  |
+----------------------------------------------------------------------------+
|   Dyspnea - Primary  Other dyspnea and respiratory abnormality             |
+----------------------------------------------------------------------------+
|   Cough                                                                    |
+----------------------------------------------------------------------------+
|   Allergic rhinitis  Allergic rhinitis, cause unspecified                  |
+----------------------------------------------------------------------------+
|   Pulmonary hypertension (HCC)  Other chronic pulmonary heart diseases     |
+----------------------------------------------------------------------------+
|   Hypervolemia  Other fluid overload                                       |
+----------------------------------------------------------------------------+
|   Obesity hypoventilation syndrome (HCC)  Obesity hypoventilation syndrome |
+----------------------------------------------------------------------------+
|   JACK on CPAP  Obstructive sleep apnea (adult) (pediatric)                 |
+----------------------------------------------------------------------------+
|   Hypothyroidism  Unspecified hypothyroidism                               |
+----------------------------------------------------------------------------+
 documented in this encounter Writer called pt to follow up on symptomatic VM.    Pt states that she is having pain in the left lower quadrant since surgery (hysterectomy) on 8/17 with Dr. Florian. She states that there is some bruising in that area but it is stable and has not increased in size or changed color.    Pt states that after surgery she was managing her pain with Tylenol and Ibuprofen that was sent home with her from the hospital. The pain is rated 7/10 before taking medication and is now a 3/10 after taking ibuprofen. Pt states she has also iced the area which assists with the pain.    Pt states that the pain is most aggravated when changing from a sitting to standing position. After she has changed positions the pain subsides.    Pt denies fevers, shortness of breath, or chest pain.    Writer advised that since pain is currently 3/10 to continue to use OTC medication, ice, warm packs, and bracing to assist with pain relief. Advised to monitor for signs of bleeding or infection.    Will route to provider for further recommendations.

## 2024-11-29 NOTE — XMS
Encounter Summary
  Created on: 2020
 
 Viviana Ricci
 External Reference #: [id number]
 : 47
 Sex: Female
 
 Demographics
 
 
+-----------------------+----------------------+
| Address               | [address]      |
|                       | [place]  [address] |
+-----------------------+----------------------+
| Home Phone            | [phone]      |
+-----------------------+----------------------+
| Preferred Language    | Unknown              |
+-----------------------+----------------------+
| Marital Status        | Single               |
+-----------------------+----------------------+
| Sabianist Affiliation | [id number]                 |
+-----------------------+----------------------+
| Race                  | Unknown              |
+-----------------------+----------------------+
| Ethnic Group          | Unknown              |
+-----------------------+----------------------+
 
 
 Author
 
 
+--------------+--------------------------------------------+
| Author       | [facility]  |
|              | and [facility]                                |
+--------------+--------------------------------------------+
| Organization | [facility]  |
|              | and [place]                                |
+--------------+--------------------------------------------+
| Address      | Unknown                                    |
+--------------+--------------------------------------------+
| Phone        | Unavailable                                |
+--------------+--------------------------------------------+
 
 
 
 Support
 
 
+----------------+--------------+---------------------+-----------------+
| Name           | Relationship | Address             | Phone           |
+----------------+--------------+---------------------+-----------------+
| Ada/Ed Radhames | ECON         | [address]              | [phone] |
|                |              | [place]  |                 |
|                |              |  [id number]              |                 |
+----------------+--------------+---------------------+-----------------+
 
 
 
 
 Care Team Providers
 
 
+------------------------+------+-----------------+
| Care Team Member Name  | Role | Phone           |
+------------------------+------+-----------------+
| Marzena Moser DO | PCP  | [phone] |
+------------------------+------+-----------------+
 
 
 
 Reason for Visit
 
 
+-------------------+----------+
| Reason            | Comments |
+-------------------+----------+
| Medication Refill |          |
+-------------------+----------+
 
 
 
 Encounter Details
 
 
+--------+--------+---------------------+----------------------+-------------------+
| Date   | Type   | Department          | Care Team            | Description       |
+--------+--------+---------------------+----------------------+-------------------+
| / | Refill |   [facility]         |   Marzena Moser  | Medication Refill |
|    |        | NEPHROLOGY  [address]   | M, DO  [address]      |                   |
|        |        | [address]   | [address]      |                   |
|        |        | [place], [place]     | [place], [place]      |                   |
|        |        | [address]          | [address]  [phone]  |                   |
|        |        | [phone]        |  [phone] (Fax)  |                   |
+--------+--------+---------------------+----------------------+-------------------+
 
 
 
 Social History
 
 
+--------------+-------+-----------+--------+------+
| Tobacco Use  | Types | Packs/Day | Years  | Date |
|              |       |           | Used   |      |
+--------------+-------+-----------+--------+------+
| Never Smoker |       |           |        |      |
+--------------+-------+-----------+--------+------+
 
 
 
+---------------------+---+---+---+
| Smokeless Tobacco:  |   |   |   |
| Never Used          |   |   |   |
+---------------------+---+---+---+
 
 
 
+---------------------------------------------------------------+
| Comments: some second hand smoke exposure, but fairly minimal |
 
+---------------------------------------------------------------+
 
 
 
+-------------+-------------+---------+----------+
| Alcohol Use | Drinks/Week | oz/Week | Comments |
+-------------+-------------+---------+----------+
| No          |             |         |          |
+-------------+-------------+---------+----------+
 
 
 
+------------------+---------------+
| Sex Assigned at  | Date Recorded |
| Birth            |               |
+------------------+---------------+
| Not on file      |               |
+------------------+---------------+
 
 
 
+----------------+-------------+-------------+
| Job Start Date | Occupation  | Industry    |
+----------------+-------------+-------------+
| Not on file    | Not on file | Not on file |
+----------------+-------------+-------------+
 
 
 
+----------------+--------------+------------+
| Travel History | Travel Start | Travel End |
+----------------+--------------+------------+
 
 
 
+-------------------------------------+
| No recent travel history available. |
+-------------------------------------+
 documented as of this encounter
 
 Plan of Treatment
 
 
+--------+-----------+------------+----------------------+-------------------+
| Date   | Type      | Specialty  | Care Team            | Description       |
+--------+-----------+------------+----------------------+-------------------+
| / | Office    | Cardiology |   Tonia Wilson,    |                   |
|    | Visit     |            | ARNP  [address]   |                   |
|        |           |            | [place], [place]  |                   |
|        |           |            | [address]  [phone]  |                   |
|        |           |            |  [phone] (Fax)  |                   |
+--------+-----------+------------+----------------------+-------------------+
| / | Hospital  | Radiology  |   Popeye Townsend, |                   |
|    | Encounter |            |  MD  [address] |                   |
|        |           |            |  [place],   |                   |
|        |           |            | [place] [address]             |                   |
|        |           |            | [phone]         |                   |
|        |           |            | [phone] (Fax)   |                   |
+--------+-----------+------------+----------------------+-------------------+
| / | Surgery   | Radiology  |   Popeye Townsend, | CV EP PPM SYSTEM  |
 
|    |           |            |  MD  [address] | IMPLANT           |
|        |           |            |  [place],   |                   |
|        |           |            | [place] [address]             |                   |
|        |           |            | [phone]         |                   |
|        |           |            | [phone] (Fax)   |                   |
+--------+-----------+------------+----------------------+-------------------+
| 02/10/ | Clinical  | Cardiology |                      |                   |
|    | Support   |            |                      |                   |
+--------+-----------+------------+----------------------+-------------------+
| / | Office    | Cardiology |   Tonia Wilson,    |                   |
|    | Visit     |            | ARNP  [address]   |                   |
|        |           |            | [place]  |                   |
|        |           |            | [id number] [phone]  |                   |
|        |           |            |  [phone] (Fax)  |                   |
+--------+-----------+------------+----------------------+-------------------+
| / | Off-Site  | Nephrology |   Marzena Moser  |                   |
|    | Visit     |            | DO [place]TI[facility]  [address] [place] [address]      |                   |
|        |           |            | [address]      |                   |
|        |           |            | [place]      |                   |
|        |           |            | [id number] [phone]  |                   |
|        |           |            |  [phone] (Fax)  |                   |
+--------+-----------+------------+----------------------+-------------------+
 documented as of this encounter
 
 Visit Diagnoses
 
 
+-------------------------------------------+
| Diagnosis                                 |
+-------------------------------------------+
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
+-------------------------------------------+
 documented in this encounter Pt c/o nausea